# Patient Record
Sex: FEMALE | Race: WHITE | NOT HISPANIC OR LATINO | Employment: OTHER | URBAN - NONMETROPOLITAN AREA
[De-identification: names, ages, dates, MRNs, and addresses within clinical notes are randomized per-mention and may not be internally consistent; named-entity substitution may affect disease eponyms.]

---

## 2017-01-16 DIAGNOSIS — G89.4 CHRONIC PAIN DISORDER: ICD-10-CM

## 2017-01-16 RX ORDER — HYDROCODONE BITARTRATE AND ACETAMINOPHEN 7.5; 325 MG/1; MG/1
1 TABLET ORAL EVERY 4 HOURS PRN
Qty: 180 TABLET | Refills: 0 | Status: SHIPPED | OUTPATIENT
Start: 2017-01-16 | End: 2017-02-08 | Stop reason: SDUPTHER

## 2017-01-24 ENCOUNTER — OFFICE VISIT (OUTPATIENT)
Dept: ENDOCRINOLOGY | Facility: CLINIC | Age: 57
End: 2017-01-24

## 2017-01-24 VITALS
HEIGHT: 68 IN | DIASTOLIC BLOOD PRESSURE: 82 MMHG | SYSTOLIC BLOOD PRESSURE: 138 MMHG | WEIGHT: 255.4 LBS | HEART RATE: 101 BPM | BODY MASS INDEX: 38.71 KG/M2

## 2017-01-24 DIAGNOSIS — E06.3 HYPOTHYROIDISM DUE TO HASHIMOTO'S THYROIDITIS: ICD-10-CM

## 2017-01-24 DIAGNOSIS — Z79.4 TYPE 2 DIABETES MELLITUS WITH DIABETIC POLYNEUROPATHY, WITH LONG-TERM CURRENT USE OF INSULIN (HCC): Primary | ICD-10-CM

## 2017-01-24 DIAGNOSIS — E11.42 TYPE 2 DIABETES MELLITUS WITH DIABETIC POLYNEUROPATHY, WITH LONG-TERM CURRENT USE OF INSULIN (HCC): Primary | ICD-10-CM

## 2017-01-24 DIAGNOSIS — E55.9 VITAMIN D DEFICIENCY: ICD-10-CM

## 2017-01-24 DIAGNOSIS — E78.2 MIXED HYPERLIPIDEMIA: ICD-10-CM

## 2017-01-24 DIAGNOSIS — E03.8 HYPOTHYROIDISM DUE TO HASHIMOTO'S THYROIDITIS: ICD-10-CM

## 2017-01-24 PROCEDURE — 99214 OFFICE O/P EST MOD 30 MIN: CPT | Performed by: NURSE PRACTITIONER

## 2017-01-24 RX ORDER — EZETIMIBE 10 MG/1
10 TABLET ORAL DAILY
Qty: 30 TABLET | Refills: 11 | Status: SHIPPED | OUTPATIENT
Start: 2017-01-24 | End: 2017-03-17

## 2017-01-24 NOTE — PROGRESS NOTES
Subjective    Ariana Martinez is a 57 y.o. female. she is here today for follow-up.    History of Present Illness       Duration/Timing: Diabetes mellitus type 2, Age at onset of diabetes: 26 years years, Onset of symptoms gradual  timing - constant     qualtiy - uncontrolled     severity - moderate        -----------------------     Severity (Complications/Hospitalizations)  Secondary Macrovascular Complications: No CAD, No CVA, No PAD  Secondary Microvascular Complications: No Diabetic Nephropathy, No Diabetic Retinopathy, Diabetic Neuropathy     Context  Diabetes Regimen: Insulin, Oral Medications, Last HgbA1c% 12.3 from Jan. 2017  Blood Glucose Readings      200 to 300    Denies any low sugars     Diet  variable carb intake  Exercise: Exercises  walking     Associated Signs/Symptoms  Hyperglycemic Symptoms: No polyuria, No polydipsia, No polyphagia, No weight gain  Hypoglycemic Episodes: Documented symptomatic hypoglycemia, Seizures and syncope related to hypoglycemia         The following portions of the patient's history were reviewed and updated as appropriate:   Past Medical History   Diagnosis Date   • Acquired hypothyroidism    • Angina, class IV    • Anxiety    • Benign paroxysmal positional vertigo    • Carpal tunnel syndrome    • Chronic pain    • Coronary atherosclerosis    • Depression    • Diabetes mellitus      Type 2, controlled   • Diabetic polyneuropathy    • Female stress incontinence    • Hashimoto's thyroiditis    • Hyperlipidemia    • Hypertension    • Low back pain    • Malaise and fatigue    • Multiple joint pain    • Obesity      Refuses to be weighed   • Otalgia      Both   • Perforation of tympanic membrane      Left   • Postoperative wound infection    • Vitamin D deficiency      Past Surgical History   Procedure Laterality Date   • Abdominal surgery     • Angioplasty       coronary   • Coronary artery bypass graft     • Cardiac catheterization     • Carpal tunnel release     •  Cholecystectomy     • Endoscopy and colonoscopy     • Mouth surgery     • Transesophageal echocardiogram (mildred)       With color flow   • Foot surgery       Toes   • Gastric banding       Revision, laparoscopic   • Hysterectomy     • Shoulder surgery     • Salpingo oophorectomy       Family History   Problem Relation Age of Onset   • Diabetes Other    • Heart disease Other    • Hypertension Other      OB History     No data available        Current Outpatient Prescriptions   Medication Sig Dispense Refill   • ARIPiprazole (ABILIFY) 15 MG tablet Take 1 tablet by mouth daily. 30 tablet 5   • aspirin 81 MG chewable tablet Chew 81 mg daily.     • atorvastatin (LIPITOR) 40 MG tablet Take 40 mg by mouth daily.     • B-D ULTRAFINE III SHORT PEN 31G X 8 MM misc USE AS DIRECTED 4 TIMES DAILY 150 each 2   • Calcium Citrate-Vitamin D (CITRACAL/VITAMIN D) 250-200 MG-UNIT tablet Take 2 tablets by mouth 2 (two) times a day.     • celecoxib (CeleBREX) 200 MG capsule Take 1 capsule by mouth daily. 90 capsule 3   • clopidogrel (PLAVIX) 75 MG tablet Take 1 tablet by mouth Daily. 90 tablet 3   • dextromethorphan 15 MG/5ML syrup Take 10 mL by mouth 4 (four) times a day as needed for cough. 240 mL 3   • Ergocalciferol (VITAMIN D2 PO) Take 50,000 Units by mouth 1 (one) time per week.     • ezetimibe (ZETIA) 10 MG tablet Take 1 tablet by mouth Daily. 30 tablet 11   • fenofibrate (TRICOR) 145 MG tablet Take 1 tablet by mouth Daily. 30 tablet 5   • furosemide (LASIX) 40 MG tablet Take 1 tablet by mouth daily. 30 tablet 5   • gabapentin (NEURONTIN) 600 MG tablet Take 600 mg by mouth 3 (three) times a day.     • glucagon (GLUCAGON EMERGENCY) 1 MG injection Inject 1 mg under the skin 1 (one) time as needed for low blood sugar.     • hydrochlorothiazide (MICROZIDE) 12.5 MG capsule Take 12.5 mg by mouth daily.     • HYDROcodone-acetaminophen (NORCO) 7.5-325 MG per tablet Take 1 tablet by mouth Every 4 (Four) Hours As Needed for moderate pain  "(4-6). 180 tablet 0   • insulin aspart (NOVOLOG FLEXPEN) 100 UNIT/ML solution pen-injector sc pen Inject 40 units with meals 30 mL 3   • Insulin Degludec 100 UNIT/ML solution pen-injector Inject 70 Units under the skin Daily. 9 pen 3   • Insulin Disposable Pump (OMNIPOD STARTER) kit      • Insulin Disposable Pump (OMNIPOD) misc      • Insulin Syringe 29G X 1/2\" 0.3 ML misc Use 3 times a day 270 each 3   • KETOCARE strip USE 1 STRIP TO CHECK FOR KETONES IF BLOOD SUGAR IS GREATER THAN 250  99   • lisinopril (PRINIVIL,ZESTRIL) 2.5 MG tablet Take 1 tablet by mouth daily. 90 tablet 3   • LORazepam (ATIVAN) 2 MG tablet Take 1 tablet by mouth 2 (Two) Times a Day As Needed for anxiety. 60 tablet 2   • mirtazapine (REMERON) 45 MG tablet TAKE 1 TABLET BY MOUTH EVERY DAY AT BEDTIME  5   • ondansetron (ZOFRAN) 8 MG tablet Take 1 tablet by mouth every 8 (eight) hours. 90 tablet 3   • ONE TOUCH ULTRA TEST test strip USE TO TEST SUGAR 4 TIMES A  each 1   • pantoprazole (PROTONIX) 40 MG EC tablet Take 1 tablet by mouth daily. 90 tablet 3   • pentoxifylline (TRENTal) 400 MG CR tablet Take 400 mg by mouth 3 (three) times a day with meals.     • RANOLAZINE ER PO Take 1 tablet by mouth 2 (two) times a day.     • venlafaxine XR (EFFEXOR-XR) 150 MG 24 hr capsule TAKE ONE CAPSULE BY MOUTH TWICE A DAY FOR DEPRESSION 180 capsule 3   • vitamin D (ERGOCALCIFEROL) 67109 UNITS capsule capsule TAKE ONE CAPSULE BY MOUTH EVERY SUNDAY  2     No current facility-administered medications for this visit.      Allergies   Allergen Reactions   • Seroquel [Quetiapine Fumarate] Anaphylaxis   • Avandia [Rosiglitazone] Swelling   • Morphine And Related Hallucinations     If patient takes to much     Social History     Social History   • Marital status:      Spouse name: N/A   • Number of children: N/A   • Years of education: N/A     Social History Main Topics   • Smoking status: Never Smoker   • Smokeless tobacco: Never Used   • Alcohol use No " "  • Drug use: No   • Sexual activity: Defer     Other Topics Concern   • None     Social History Narrative       Review of Systems  Review of Systems   Constitutional: Negative for activity change, appetite change, diaphoresis and fatigue.   HENT: Negative for congestion, dental problem, drooling, ear discharge, ear pain, facial swelling, sneezing, sore throat, tinnitus, trouble swallowing and voice change.    Eyes: Negative for photophobia, pain, discharge, redness, itching and visual disturbance.   Respiratory: Negative for apnea, cough, choking, chest tightness and shortness of breath.    Cardiovascular: Negative for chest pain, palpitations and leg swelling.   Gastrointestinal: Negative for abdominal distention, abdominal pain, constipation, diarrhea, nausea and vomiting.   Endocrine: Negative for cold intolerance, heat intolerance, polydipsia, polyphagia and polyuria.   Genitourinary: Negative for difficulty urinating, dysuria, frequency, hematuria and urgency.   Musculoskeletal: Negative for arthralgias, back pain, gait problem, joint swelling, myalgias, neck pain and neck stiffness.   Skin: Negative for color change, pallor, rash and wound.   Allergic/Immunologic: Negative for environmental allergies, food allergies and immunocompromised state.   Neurological: Negative for dizziness, tremors, facial asymmetry, weakness, light-headedness, numbness and headaches.   Hematological: Negative for adenopathy. Does not bruise/bleed easily.   Psychiatric/Behavioral: Negative for agitation, behavioral problems, confusion, decreased concentration and sleep disturbance.        Objective      Visit Vitals   • /82 (BP Location: Left arm, Patient Position: Sitting, Cuff Size: Adult)   • Pulse 101   • Ht 68\" (172.7 cm)   • Wt 255 lb 6.4 oz (116 kg)   • BMI 38.83 kg/m2     Physical Exam   Constitutional: She is oriented to person, place, and time. She appears well-developed and well-nourished. No distress.   HENT: "   Head: Normocephalic and atraumatic.   Right Ear: External ear normal.   Left Ear: External ear normal.   Nose: Nose normal.   Eyes: Conjunctivae and EOM are normal. Pupils are equal, round, and reactive to light.   Neck: Normal range of motion. Neck supple. No tracheal deviation present. No thyromegaly present.   Cardiovascular: Normal rate, regular rhythm and normal heart sounds.    No murmur heard.  Pulmonary/Chest: Effort normal and breath sounds normal. No respiratory distress. She has no wheezes.   Abdominal: Soft. Bowel sounds are normal. There is no tenderness. There is no rebound and no guarding.   Musculoskeletal: Normal range of motion. She exhibits no edema, tenderness or deformity.   Neurological: She is alert and oriented to person, place, and time. No cranial nerve deficit.   Skin: Skin is warm and dry. No rash noted.   Psychiatric: She has a normal mood and affect. Her behavior is normal. Judgment and thought content normal.       Lab Review  GLUCOSE (mg/dl)   Date Value   01/19/2017 287 (H)   11/12/2016 250 (H)   11/11/2016 297 (H)     SODIUM (mmol/L)   Date Value   01/19/2017 140   11/12/2016 135 (L)   11/11/2016 134 (L)     POTASSIUM (mmol/L)   Date Value   01/19/2017 4.6   11/12/2016 4.0   11/11/2016 3.4 (L)     CHLORIDE (mmol/L)   Date Value   01/19/2017 100   11/12/2016 94 (L)   11/11/2016 90 (L)     CO2 (mmol/L)   Date Value   01/19/2017 28   11/12/2016 30   11/11/2016 28     BUN (mg/dl)   Date Value   01/19/2017 14   11/12/2016 18   11/11/2016 16     CREATININE (mg/dl)   Date Value   01/19/2017 0.9   11/12/2016 1.1 (H)   11/11/2016 1.3 (H)     HEMOGLOBIN A1C (%TotHgb)   Date Value   01/19/2017 12.3 (H)   08/26/2016 11.0 (H)   05/26/2016 11.5 (H)     TRIGLYCERIDES (mg/dl)   Date Value   01/19/2017 235 (H)   11/12/2016 290 (H)   06/20/2016 267 (H)       Assessment/Plan      1. Type 2 diabetes mellitus with diabetic polyneuropathy, with long-term current use of insulin    2. Hypothyroidism due  to Hashimoto's thyroiditis    3. Mixed hyperlipidemia    4. Vitamin D deficiency    .    Medications prescribed:  Outpatient Encounter Prescriptions as of 1/24/2017   Medication Sig Dispense Refill   • ARIPiprazole (ABILIFY) 15 MG tablet Take 1 tablet by mouth daily. 30 tablet 5   • aspirin 81 MG chewable tablet Chew 81 mg daily.     • atorvastatin (LIPITOR) 40 MG tablet Take 40 mg by mouth daily.     • B-D ULTRAFINE III SHORT PEN 31G X 8 MM misc USE AS DIRECTED 4 TIMES DAILY 150 each 2   • Calcium Citrate-Vitamin D (CITRACAL/VITAMIN D) 250-200 MG-UNIT tablet Take 2 tablets by mouth 2 (two) times a day.     • celecoxib (CeleBREX) 200 MG capsule Take 1 capsule by mouth daily. 90 capsule 3   • clopidogrel (PLAVIX) 75 MG tablet Take 1 tablet by mouth Daily. 90 tablet 3   • dextromethorphan 15 MG/5ML syrup Take 10 mL by mouth 4 (four) times a day as needed for cough. 240 mL 3   • Ergocalciferol (VITAMIN D2 PO) Take 50,000 Units by mouth 1 (one) time per week.     • ezetimibe (ZETIA) 10 MG tablet Take 1 tablet by mouth Daily. 30 tablet 11   • fenofibrate (TRICOR) 145 MG tablet Take 1 tablet by mouth Daily. 30 tablet 5   • furosemide (LASIX) 40 MG tablet Take 1 tablet by mouth daily. 30 tablet 5   • gabapentin (NEURONTIN) 600 MG tablet Take 600 mg by mouth 3 (three) times a day.     • glucagon (GLUCAGON EMERGENCY) 1 MG injection Inject 1 mg under the skin 1 (one) time as needed for low blood sugar.     • hydrochlorothiazide (MICROZIDE) 12.5 MG capsule Take 12.5 mg by mouth daily.     • HYDROcodone-acetaminophen (NORCO) 7.5-325 MG per tablet Take 1 tablet by mouth Every 4 (Four) Hours As Needed for moderate pain (4-6). 180 tablet 0   • insulin aspart (NOVOLOG FLEXPEN) 100 UNIT/ML solution pen-injector sc pen Inject 40 units with meals 30 mL 3   • Insulin Degludec 100 UNIT/ML solution pen-injector Inject 70 Units under the skin Daily. 9 pen 3   • Insulin Disposable Pump (OMNIPOD STARTER) kit      • Insulin Disposable Pump  "(OMNIPOD) misc      • Insulin Syringe 29G X 1/2\" 0.3 ML misc Use 3 times a day 270 each 3   • KETOCARE strip USE 1 STRIP TO CHECK FOR KETONES IF BLOOD SUGAR IS GREATER THAN 250  99   • lisinopril (PRINIVIL,ZESTRIL) 2.5 MG tablet Take 1 tablet by mouth daily. 90 tablet 3   • LORazepam (ATIVAN) 2 MG tablet Take 1 tablet by mouth 2 (Two) Times a Day As Needed for anxiety. 60 tablet 2   • mirtazapine (REMERON) 45 MG tablet TAKE 1 TABLET BY MOUTH EVERY DAY AT BEDTIME  5   • ondansetron (ZOFRAN) 8 MG tablet Take 1 tablet by mouth every 8 (eight) hours. 90 tablet 3   • ONE TOUCH ULTRA TEST test strip USE TO TEST SUGAR 4 TIMES A  each 1   • pantoprazole (PROTONIX) 40 MG EC tablet Take 1 tablet by mouth daily. 90 tablet 3   • pentoxifylline (TRENTal) 400 MG CR tablet Take 400 mg by mouth 3 (three) times a day with meals.     • RANOLAZINE ER PO Take 1 tablet by mouth 2 (two) times a day.     • venlafaxine XR (EFFEXOR-XR) 150 MG 24 hr capsule TAKE ONE CAPSULE BY MOUTH TWICE A DAY FOR DEPRESSION 180 capsule 3   • vitamin D (ERGOCALCIFEROL) 70701 UNITS capsule capsule TAKE ONE CAPSULE BY MOUTH EVERY SUNDAY  2   • [DISCONTINUED] insulin aspart (NovoLOG) 100 UNIT/ML injection Inject 120 Units under the skin daily.     • [DISCONTINUED] Insulin Glargine (TOUJEO SOLOSTAR) 300 UNIT/ML solution pen-injector Inject 60 Units under the skin Every Night. 3 pen 3     No facility-administered encounter medications on file as of 1/24/2017.        Orders placed during this encounter include:  Orders Placed This Encounter   Procedures   • Comprehensive Metabolic Panel   • Hemoglobin A1c   • Lipid Panel   • TSH   • Vitamin D 25 Hydroxy           Glycemic Management                    Toujeo ---patient taking 60- increase to 65 units --decreased to 60 units ( changed to tresiba per insurance)--try 63 units        ==================        Novolog      25 units with meals -- increase to 30 units     Discussed carb counting but states " cannot    She will need to look at her meals because 30 units may not be enough for some meals and for others meals to strong    If you eat a meal and give 30 units and your sugar is 200 or higher before the next meal look back at that meal and the next time you eat it either give more insulin or eat less    Also if you have a low after the meal give less insulin the next time you eat that meal                  ==================     Jardiance 10 mg tablet , take before breakfast---will stop due to dizziness for possible volume depletion-----the dizziness has resolved since stopping jardiance        ==================     Metformin 500 mg tablets - caused diarrhea      ====================     start bydureon - tolerated but since trouble with gastroparesis, stop            januvia 100 mg daily --keep     ------------------------------     Lipid Management        on Lipitor   on fenofibrate     August 2015     LDL - 122     missed some doses of medication      Feb. 2016     Tg - 241  LDL - 102     missing doses still - please be complaint     May 2016     LDL - 85     Jan. 2017    LDL - 120    Has been taking medication faithfully     LDL needs to be 70 or less    add zetia    Also discussed restarting the jardiance for the heart benefits but refuses        Blood Pressure Management: Control of blood pressure  on ACEi     stopped the lasix   Microvascular Complication Monitoring: Neuropathy type sensorial  neurontin     intermittetly takes reglan for gastroparesis     states eye exam ws 2015  RX for shoes - diabetic neuropathy      Immunization - last influenza vaccine Oct. 2016   Other Diabetes Related Aspects  TSH abnormal from July to Sept 2014     TSH in the 5 to 7 range     confirmed now Jan 2015     start levothyroxine 50 mcgs daily, skip on Sunday     now TSH nl      stopped levothyroxine      TSH - nl     will monitor TSH         August 2015     TSH - nl     FEb. 2016     TSh - 6     she refuses thyroid  medicaton      May 2016     TSH - 5     Jan. 2017      TSH - nl  ---     3-15     B12 low      now b12 shots     June 2015     B12- 968                     4. Follow-up: Return in about 3 months (around 4/24/2017) for Recheck.

## 2017-01-24 NOTE — MR AVS SNAPSHOT
Ariana Martinez   1/24/2017 9:15 AM   Office Visit    Dept Phone:  [phone]   Encounter #:  [id number]    Provider:  BEBE Prince   Department:  [facility] ENDOCRINOLOGY                Your Full Care Plan              Today's Medication Changes          These changes are accurate as of: 1/24/17  9:46 AM.  If you have any questions, ask your nurse or doctor.               Medication(s)that have changed:     insulin aspart 100 UNIT/ML solution pen-injector sc pen   Commonly known as:  novoLOG FLEXPEN   Inject 40 units with meals   What changed:  Another medication with the same name was removed. Continue taking this medication, and follow the directions you see here.   Changed by:  Zulay Dan MA         Stop taking medication(s)listed here:     Insulin Glargine 300 UNIT/ML solution pen-injector   Commonly known as:  TOUJEO SOLOSTAR   Stopped by:  BEBE Prince                      Your Updated Medication List          This list is accurate as of: 1/24/17  9:46 AM.  Always use your most recent med list.                ARIPiprazole 15 MG tablet   Commonly known as:  ABILIFY   Take 1 tablet by mouth daily.       aspirin 81 MG chewable tablet       atorvastatin 40 MG tablet   Commonly known as:  LIPITOR       B-D ULTRAFINE III SHORT PEN 31G X 8 MM misc   Generic drug:  Insulin Pen Needle   USE AS DIRECTED 4 TIMES DAILY       celecoxib 200 MG capsule   Commonly known as:  CeleBREX   Take 1 capsule by mouth daily.       CITRACAL/VITAMIN D 250-200 MG-UNIT tablet   Generic drug:  Calcium Citrate-Vitamin D       clopidogrel 75 MG tablet   Commonly known as:  PLAVIX   Take 1 tablet by mouth Daily.       dextromethorphan 15 MG/5ML syrup   Take 10 mL by mouth 4 (four) times a day as needed for cough.       fenofibrate 145 MG tablet   Commonly known as:  TRICOR   Take 1 tablet by mouth Daily.       furosemide 40 MG tablet   Commonly known as:   "LASIX   Take 1 tablet by mouth daily.       gabapentin 600 MG tablet   Commonly known as:  NEURONTIN       GLUCAGON EMERGENCY 1 MG injection   Generic drug:  glucagon       hydrochlorothiazide 12.5 MG capsule   Commonly known as:  MICROZIDE       HYDROcodone-acetaminophen 7.5-325 MG per tablet   Commonly known as:  NORCO   Take 1 tablet by mouth Every 4 (Four) Hours As Needed for moderate pain (4-6).       insulin aspart 100 UNIT/ML solution pen-injector sc pen   Commonly known as:  novoLOG FLEXPEN   Inject 40 units with meals       Insulin Degludec 100 UNIT/ML solution pen-injector   Inject 70 Units under the skin Daily.       Insulin Syringe 29G X 1/2\" 0.3 ML misc   Use 3 times a day       KETOCARE strip   Generic drug:  acetone (urine) test       lisinopril 2.5 MG tablet   Commonly known as:  PRINIVIL,ZESTRIL   Take 1 tablet by mouth daily.       LORazepam 2 MG tablet   Commonly known as:  ATIVAN   Take 1 tablet by mouth 2 (Two) Times a Day As Needed for anxiety.       mirtazapine 45 MG tablet   Commonly known as:  REMERON       * OMNIPOD misc       * OMNIPOD STARTER kit       ondansetron 8 MG tablet   Commonly known as:  ZOFRAN   Take 1 tablet by mouth every 8 (eight) hours.       ONE TOUCH ULTRA TEST test strip   Generic drug:  glucose blood   USE TO TEST SUGAR 4 TIMES A DAY       pantoprazole 40 MG EC tablet   Commonly known as:  PROTONIX   Take 1 tablet by mouth daily.       pentoxifylline 400 MG CR tablet   Commonly known as:  TRENtal       RANOLAZINE ER PO       venlafaxine  MG 24 hr capsule   Commonly known as:  EFFEXOR-XR   TAKE ONE CAPSULE BY MOUTH TWICE A DAY FOR DEPRESSION       * VITAMIN D2 PO       * vitamin D 72694 UNITS capsule capsule   Commonly known as:  ERGOCALCIFEROL       * Notice:  This list has 4 medication(s) that are the same as other medications prescribed for you. Read the directions carefully, and ask your doctor or other care provider to review them with you.            We " Performed the Following     CBC & Differential     Comprehensive Metabolic Panel     Hemoglobin A1c     Lipid Panel     TSH     Vitamin D 25 Hydroxy       You Were Diagnosed With        Codes Comments    Type 2 diabetes mellitus with diabetic polyneuropathy, with long-term current use of insulin    -  Primary ICD-10-CM: E11.42, Z79.4  ICD-9-CM: 250.60, 357.2, V58.67     Hypothyroidism due to Hashimoto's thyroiditis     ICD-10-CM: E03.8, E06.3  ICD-9-CM: 244.8, 245.2     Mixed hyperlipidemia     ICD-10-CM: E78.2  ICD-9-CM: 272.2     Vitamin D deficiency     ICD-10-CM: E55.9  ICD-9-CM: 268.9       Instructions     None    Patient Instructions History      Upcoming Appointments     Visit Type Date Time Department    OFFICE VISIT 1/24/2017  9:15 AM [facility] ENDOCRINOLOG[facility]    OFFICE VISIT 2/8/2017 11:15 AM [facility]    FOLLOW UP 4/24/2017  8:45 AM [facility] ENDOCRINOLOGY [facility]      MyChart Signup     Our records indicate that your [employer] account has been deactivated. If you would like to reactivate your account, please email [email or web address] or call [phone] to talk to our [employer] staff.             Other Info from Your Visit           Your Appointments     Feb 08, 2017 11:15 AM CST   Office Visit with Francisca Fong MD   [facility] (--)    [address] [place] [address] [address] [place] [address] [phone]           Arrive 15 minutes prior to appointment.            Apr 24, 2017  8:45 AM CDT   Follow Up with BEBE Prince   [facility] ENDOCRINOLOGY (--)    [address]  [place] [address] [place] [address] [place] [address] [phone]           Arrive 15 minutes prior to appointment.              Allergies     Seroquel [Quetiapine Fumarate]  Anaphylaxis    Avandia [Rosiglitazone]  Swelling    Morphine And Related  Hallucinations    If patient takes to much      Reason for Visit     Diabetes anaya      Vital Signs     Blood Pressure  "Pulse Height Weight Body Mass Index Smoking Status    138/82 (BP Location: Left arm, Patient Position: Sitting, Cuff Size: Adult) 101 68\" (172.7 cm) 255 lb 6.4 oz (116 kg) 38.83 kg/m2 Never Smoker      Problems and Diagnoses Noted     Hypothyroidism due to Hashimoto's thyroiditis    Mixed hyperlipidemia    Diabetes with neurologic complications    Vitamin D deficiency        "

## 2017-02-01 DIAGNOSIS — R60.9 FLUID RETENTION: ICD-10-CM

## 2017-02-02 DIAGNOSIS — R60.9 FLUID RETENTION: ICD-10-CM

## 2017-02-02 RX ORDER — FUROSEMIDE 40 MG/1
40 TABLET ORAL DAILY
Qty: 30 TABLET | Refills: 5 | Status: CANCELLED | OUTPATIENT
Start: 2017-02-02

## 2017-02-03 RX ORDER — FUROSEMIDE 40 MG/1
TABLET ORAL
Qty: 30 TABLET | Refills: 5 | Status: SHIPPED | OUTPATIENT
Start: 2017-02-03 | End: 2017-02-06 | Stop reason: SDUPTHER

## 2017-02-06 DIAGNOSIS — R60.9 FLUID RETENTION: ICD-10-CM

## 2017-02-06 RX ORDER — FUROSEMIDE 40 MG/1
40 TABLET ORAL DAILY
Qty: 90 TABLET | Refills: 3 | Status: SHIPPED | OUTPATIENT
Start: 2017-02-06 | End: 2018-03-01 | Stop reason: SDUPTHER

## 2017-02-08 ENCOUNTER — OFFICE VISIT (OUTPATIENT)
Dept: FAMILY MEDICINE CLINIC | Facility: CLINIC | Age: 57
End: 2017-02-08

## 2017-02-08 ENCOUNTER — LAB (OUTPATIENT)
Dept: LAB | Facility: OTHER | Age: 57
End: 2017-02-08

## 2017-02-08 VITALS
SYSTOLIC BLOOD PRESSURE: 132 MMHG | TEMPERATURE: 97.7 F | HEART RATE: 86 BPM | HEIGHT: 68 IN | DIASTOLIC BLOOD PRESSURE: 86 MMHG

## 2017-02-08 DIAGNOSIS — M54.6 ACUTE MIDLINE THORACIC BACK PAIN: ICD-10-CM

## 2017-02-08 DIAGNOSIS — Z11.59 NEED FOR HEPATITIS C SCREENING TEST: ICD-10-CM

## 2017-02-08 DIAGNOSIS — R20.0 LEFT ARM NUMBNESS: ICD-10-CM

## 2017-02-08 DIAGNOSIS — G89.4 CHRONIC PAIN DISORDER: ICD-10-CM

## 2017-02-08 DIAGNOSIS — F41.1 GAD (GENERALIZED ANXIETY DISORDER): ICD-10-CM

## 2017-02-08 DIAGNOSIS — Z79.891 LONG TERM PRESCRIPTION OPIATE USE: ICD-10-CM

## 2017-02-08 DIAGNOSIS — R07.9 CHEST PAIN, UNSPECIFIED TYPE: Primary | ICD-10-CM

## 2017-02-08 PROCEDURE — 99214 OFFICE O/P EST MOD 30 MIN: CPT | Performed by: FAMILY MEDICINE

## 2017-02-08 PROCEDURE — 80307 DRUG TEST PRSMV CHEM ANLYZR: CPT | Performed by: FAMILY MEDICINE

## 2017-02-08 PROCEDURE — 80074 ACUTE HEPATITIS PANEL: CPT | Performed by: FAMILY MEDICINE

## 2017-02-08 PROCEDURE — 93000 ELECTROCARDIOGRAM COMPLETE: CPT | Performed by: FAMILY MEDICINE

## 2017-02-08 RX ORDER — NABUMETONE 500 MG/1
500 TABLET, FILM COATED ORAL 2 TIMES DAILY PRN
Qty: 60 TABLET | Refills: 5 | Status: SHIPPED | OUTPATIENT
Start: 2017-02-08 | End: 2017-07-25 | Stop reason: SDUPTHER

## 2017-02-08 RX ORDER — LORAZEPAM 2 MG/1
2 TABLET ORAL 2 TIMES DAILY PRN
Qty: 60 TABLET | Refills: 2 | Status: SHIPPED | OUTPATIENT
Start: 2017-02-08 | End: 2017-05-08 | Stop reason: SDUPTHER

## 2017-02-08 RX ORDER — HYDROCODONE BITARTRATE AND ACETAMINOPHEN 7.5; 325 MG/1; MG/1
1 TABLET ORAL EVERY 4 HOURS PRN
Qty: 180 TABLET | Refills: 0 | Status: SHIPPED | OUTPATIENT
Start: 2017-02-08 | End: 2017-03-15 | Stop reason: SDUPTHER

## 2017-02-08 NOTE — PATIENT INSTRUCTIONS
Stop celebrex  Start relafen  Discuss with Dr Fernandez    ekg today    Ordered neck and thoracic spine xray    Screening for hepatitis C ordered    Recheck in 3 months

## 2017-02-08 NOTE — PROGRESS NOTES
Procedure     ECG 12 Lead  Date/Time: 2/8/2017 11:56 AM  Performed by: LISA SUTTON  Authorized by: LISA SUTTON   Comparison: not compared with previous ECG   Rhythm: sinus rhythm  Rate: normal  BPM: 86  ST Segments: ST segments normal  T Waves: T waves normal  QRS axis: normal  Other: no other findings  Clinical impression: normal ECG

## 2017-02-08 NOTE — PROGRESS NOTES
Subjective   Chief Complaint   Patient presents with   • Diabetes     3 month follow up     Ariana Martinez is a 57 y.o. female.   Diabetes (3 month follow up)    [facility] - peripheral neuropathy, anxiety, depression, obesity, diabetes    Chest Pain    This is a new problem. The current episode started yesterday. The onset quality is gradual. The problem occurs constantly. The problem has been gradually worsening. The pain is present in the substernal region. The pain is at a severity of 8/10. The pain is moderate. The quality of the pain is described as sharp and stabbing. The pain radiates to the mid back and left arm. Associated symptoms include back pain, malaise/fatigue and near-syncope. Pertinent negatives include no abdominal pain, cough, dizziness, fever, headaches, nausea, palpitations or shortness of breath. The pain is aggravated by movement. She has tried rest for the symptoms. The treatment provided mild relief.   Her past medical history is significant for anxiety/panic attacks.   Her family medical history is significant for CAD and stroke. Prior diagnostic workup includes echocardiogram and stress thallium.     dm - follow with Dr Onofre    chronic joint/ back pain - controlled with norco    depression - controlled with effexor and abilify    insomnia - controlled with remeron    celebrex is costing patient $80/ month out of pocket  Would like to change medication    The following portions of the patient's history were reviewed and updated as appropriate: allergies, current medications, past family history, past medical history, past social history, past surgical history and problem list.    Review of Systems   Constitutional: Positive for malaise/fatigue. Negative for appetite change, chills, fatigue and fever.   HENT: Negative for congestion, ear pain, rhinorrhea and sore throat.    Eyes: Negative for pain.   Respiratory: Negative for cough and shortness of breath.    Cardiovascular: Positive for  "near-syncope. Negative for chest pain and palpitations.   Gastrointestinal: Negative for abdominal pain, constipation, diarrhea and nausea.   Genitourinary: Negative for dysuria.   Musculoskeletal: Positive for arthralgias, back pain and neck pain. Negative for joint swelling.   Skin: Negative for rash.   Neurological: Negative for dizziness and headaches.       Objective   Visit Vitals   • /86   • Pulse 86   • Temp 97.7 °F (36.5 °C)   • Ht 68\" (172.7 cm)     Physical Exam   Constitutional: She is oriented to person, place, and time. She appears well-developed and well-nourished.   HENT:   Head: Normocephalic and atraumatic.   Eyes: Pupils are equal, round, and reactive to light.   Neck: Neck supple. Spinous process tenderness and muscular tenderness present. Decreased range of motion present.   Cardiovascular: Normal rate, regular rhythm, normal heart sounds and normal pulses.    No chest wall tenderness with palpation   Pulmonary/Chest: Effort normal and breath sounds normal. No respiratory distress. She has no wheezes. She has no rales.   Abdominal: Soft. Bowel sounds are normal.   Musculoskeletal:        Cervical back: She exhibits decreased range of motion, bony tenderness and pain.        Thoracic back: She exhibits decreased range of motion, bony tenderness and pain.   Neurological: She is alert and oriented to person, place, and time.   Skin: Skin is warm and dry.   Psychiatric: She has a normal mood and affect.   Nursing note and vitals reviewed.      Assessment/Plan   Problems Addressed this Visit        Other    MAURICIO (generalized anxiety disorder)    Relevant Medications    LORazepam (ATIVAN) 2 MG tablet    Long term prescription opiate use    Relevant Orders    Urine Drug Screen    Chronic pain disorder    Relevant Medications    HYDROcodone-acetaminophen (NORCO) 7.5-325 MG per tablet      Other Visit Diagnoses     Chest pain, unspecified type    -  Primary    Relevant Orders    ECG 12 Lead    Left " arm numbness        Relevant Orders    XR Spine Cervical 3 View    Acute midline thoracic back pain        Relevant Orders    XR Spine Thoracic 2 View    Need for hepatitis C screening test        Relevant Orders    Hepatitis Panel, Acute        Stop celebrex  Start relafen  Discuss with Dr Fernandez    ekleonor today    uds today  perla up to date  Refilled norco and ativan    Ordered neck and thoracic spine xray    Screening for hepatitis C ordered    Recheck in 3 months

## 2017-02-09 ENCOUNTER — TELEPHONE (OUTPATIENT)
Dept: FAMILY MEDICINE CLINIC | Facility: CLINIC | Age: 57
End: 2017-02-09

## 2017-02-09 DIAGNOSIS — F33.1 DEPRESSION, MAJOR, RECURRENT, MODERATE (HCC): ICD-10-CM

## 2017-02-09 LAB
AMPHET+METHAMPHET UR QL: NEGATIVE
BARBITURATES UR QL SCN: NEGATIVE
BENZODIAZ UR QL SCN: NEGATIVE
CANNABINOIDS SERPL QL: NEGATIVE
COCAINE UR QL: NEGATIVE
METHADONE UR QL SCN: NEGATIVE
OPIATES UR QL: POSITIVE
OXYCODONE UR QL SCN: NEGATIVE

## 2017-02-09 RX ORDER — ARIPIPRAZOLE 15 MG/1
TABLET ORAL
Qty: 30 TABLET | Refills: 5 | Status: SHIPPED | OUTPATIENT
Start: 2017-02-09 | End: 2017-09-28 | Stop reason: SDUPTHER

## 2017-02-09 NOTE — TELEPHONE ENCOUNTER
----- Message from Francisca Fong MD sent at 2/8/2017  3:16 PM CST -----  Mild degenerative changes of the thoracic spine.  No compression abnormality.  No fractures.

## 2017-02-09 NOTE — TELEPHONE ENCOUNTER
Called pt and told her results. Told her to continue taking her meds for pain. Pt stated her understanding.

## 2017-02-10 LAB
HAV IGM SERPL QL IA: NEGATIVE
HBV CORE IGM SERPL QL IA: NEGATIVE
HBV SURFACE AG SERPL QL IA: NEGATIVE
HCV AB SER DONR QL: NEGATIVE
HCV S/C RATIO: 0.03 (ref 0–0.89)

## 2017-02-21 ENCOUNTER — TELEPHONE (OUTPATIENT)
Dept: FAMILY MEDICINE CLINIC | Facility: CLINIC | Age: 57
End: 2017-02-21

## 2017-02-21 RX ORDER — MIRTAZAPINE 45 MG/1
TABLET, FILM COATED ORAL
Qty: 30 TABLET | Refills: 5 | Status: SHIPPED | OUTPATIENT
Start: 2017-02-21 | End: 2017-08-20 | Stop reason: SDUPTHER

## 2017-02-21 NOTE — TELEPHONE ENCOUNTER
----- Message from Zulay Escalona sent at 2/21/2017  9:03 AM CST -----  Patient called and said we had tried to reach her for the results on her and her husbands ([email or web address]) bloodwork. She said you could give her a call back at [phone]. Nadeem Martinez (10/12/1944)

## 2017-03-11 ENCOUNTER — APPOINTMENT (OUTPATIENT)
Dept: GENERAL RADIOLOGY | Facility: HOSPITAL | Age: 57
End: 2017-03-11

## 2017-03-11 ENCOUNTER — HOSPITAL ENCOUNTER (OUTPATIENT)
Facility: HOSPITAL | Age: 57
Setting detail: OBSERVATION
Discharge: HOME | End: 2017-03-12
Attending: FAMILY MEDICINE | Admitting: INTERNAL MEDICINE

## 2017-03-11 DIAGNOSIS — R07.2 PRECORDIAL PAIN: Primary | ICD-10-CM

## 2017-03-11 LAB
ALBUMIN SERPL-MCNC: 4.7 G/DL (ref 3.4–4.8)
ALBUMIN/GLOB SERPL: 1.6 G/DL (ref 1.1–1.8)
ALP SERPL-CCNC: 84 U/L (ref 38–126)
ALT SERPL W P-5'-P-CCNC: 46 U/L (ref 9–52)
ANION GAP SERPL CALCULATED.3IONS-SCNC: 17 MMOL/L (ref 5–15)
AST SERPL-CCNC: 25 U/L (ref 14–36)
BASOPHILS # BLD AUTO: 0.04 10*3/MM3 (ref 0–0.2)
BASOPHILS NFR BLD AUTO: 0.5 % (ref 0–2)
BILIRUB SERPL-MCNC: 0.8 MG/DL (ref 0.2–1.3)
BUN BLD-MCNC: 21 MG/DL (ref 7–21)
BUN/CREAT SERPL: 21.2 (ref 7–25)
CALCIUM SPEC-SCNC: 9.9 MG/DL (ref 8.4–10.2)
CHLORIDE SERPL-SCNC: 97 MMOL/L (ref 95–110)
CK MB SERPL-CCNC: 1.24 NG/ML (ref 0–5)
CK MB SERPL-CCNC: 1.57 NG/ML (ref 0–5)
CK SERPL-CCNC: 43 U/L (ref 30–135)
CK SERPL-CCNC: 57 U/L (ref 30–135)
CO2 SERPL-SCNC: 24 MMOL/L (ref 22–31)
CREAT BLD-MCNC: 0.99 MG/DL (ref 0.5–1)
DEPRECATED RDW RBC AUTO: 39.4 FL (ref 36.4–46.3)
EOSINOPHIL # BLD AUTO: 0.22 10*3/MM3 (ref 0–0.7)
EOSINOPHIL NFR BLD AUTO: 2.6 % (ref 0–7)
ERYTHROCYTE [DISTWIDTH] IN BLOOD BY AUTOMATED COUNT: 13.1 % (ref 11.5–14.5)
GFR SERPL CREATININE-BSD FRML MDRD: 58 ML/MIN/1.73 (ref 51–120)
GLOBULIN UR ELPH-MCNC: 3 GM/DL (ref 2.3–3.5)
GLUCOSE BLD-MCNC: 302 MG/DL (ref 60–100)
GLUCOSE BLDC GLUCOMTR-MCNC: 145 MG/DL (ref 70–130)
GLUCOSE BLDC GLUCOMTR-MCNC: 60 MG/DL (ref 70–130)
HBA1C MFR BLD: 11.99 % (ref 4–5.6)
HCT VFR BLD AUTO: 39.6 % (ref 35–45)
HGB BLD-MCNC: 13.6 G/DL (ref 12–15.5)
HOLD SPECIMEN: NORMAL
HOLD SPECIMEN: NORMAL
IMM GRANULOCYTES # BLD: 0.02 10*3/MM3 (ref 0–0.02)
IMM GRANULOCYTES NFR BLD: 0.2 % (ref 0–0.5)
INR PPP: 0.94 (ref 0.8–1.2)
LIPASE SERPL-CCNC: 163 U/L (ref 23–300)
LYMPHOCYTES # BLD AUTO: 1.73 10*3/MM3 (ref 0.6–4.2)
LYMPHOCYTES NFR BLD AUTO: 20.1 % (ref 10–50)
MCH RBC QN AUTO: 28.9 PG (ref 26.5–34)
MCHC RBC AUTO-ENTMCNC: 34.3 G/DL (ref 31.4–36)
MCV RBC AUTO: 84.1 FL (ref 80–98)
MONOCYTES # BLD AUTO: 0.27 10*3/MM3 (ref 0–0.9)
MONOCYTES NFR BLD AUTO: 3.1 % (ref 0–12)
NEUTROPHILS # BLD AUTO: 6.33 10*3/MM3 (ref 2–8.6)
NEUTROPHILS NFR BLD AUTO: 73.5 % (ref 37–80)
NT-PROBNP SERPL-MCNC: 70.6 PG/ML (ref 0–900)
PLATELET # BLD AUTO: 446 10*3/MM3 (ref 150–450)
PMV BLD AUTO: 9.9 FL (ref 8–12)
POTASSIUM BLD-SCNC: 3.6 MMOL/L (ref 3.5–5.1)
PROT SERPL-MCNC: 7.7 G/DL (ref 6.3–8.6)
PROTHROMBIN TIME: 12.6 SECONDS (ref 11.1–15.3)
RBC # BLD AUTO: 4.71 10*6/MM3 (ref 3.77–5.16)
SODIUM BLD-SCNC: 138 MMOL/L (ref 137–145)
TROPONIN I SERPL-MCNC: <0.012 NG/ML
TSH SERPL DL<=0.05 MIU/L-ACNC: 4.87 MIU/ML (ref 0.46–4.68)
WBC NRBC COR # BLD: 8.61 10*3/MM3 (ref 3.2–9.8)
WHOLE BLOOD HOLD SPECIMEN: NORMAL
WHOLE BLOOD HOLD SPECIMEN: NORMAL

## 2017-03-11 PROCEDURE — 99243 OFF/OP CNSLTJ NEW/EST LOW 30: CPT | Performed by: INTERNAL MEDICINE

## 2017-03-11 PROCEDURE — 96375 TX/PRO/DX INJ NEW DRUG ADDON: CPT

## 2017-03-11 PROCEDURE — 85025 COMPLETE CBC W/AUTO DIFF WBC: CPT | Performed by: FAMILY MEDICINE

## 2017-03-11 PROCEDURE — 83690 ASSAY OF LIPASE: CPT | Performed by: FAMILY MEDICINE

## 2017-03-11 PROCEDURE — 84484 ASSAY OF TROPONIN QUANT: CPT | Performed by: FAMILY MEDICINE

## 2017-03-11 PROCEDURE — 83880 ASSAY OF NATRIURETIC PEPTIDE: CPT | Performed by: FAMILY MEDICINE

## 2017-03-11 PROCEDURE — 93005 ELECTROCARDIOGRAM TRACING: CPT | Performed by: FAMILY MEDICINE

## 2017-03-11 PROCEDURE — 82553 CREATINE MB FRACTION: CPT | Performed by: FAMILY MEDICINE

## 2017-03-11 PROCEDURE — 84443 ASSAY THYROID STIM HORMONE: CPT | Performed by: FAMILY MEDICINE

## 2017-03-11 PROCEDURE — 25010000002 ONDANSETRON PER 1 MG: Performed by: FAMILY MEDICINE

## 2017-03-11 PROCEDURE — 96376 TX/PRO/DX INJ SAME DRUG ADON: CPT

## 2017-03-11 PROCEDURE — 82550 ASSAY OF CK (CPK): CPT | Performed by: FAMILY MEDICINE

## 2017-03-11 PROCEDURE — G0378 HOSPITAL OBSERVATION PER HR: HCPCS

## 2017-03-11 PROCEDURE — 83036 HEMOGLOBIN GLYCOSYLATED A1C: CPT | Performed by: FAMILY MEDICINE

## 2017-03-11 PROCEDURE — 25010000002 MORPHINE PER 10 MG: Performed by: FAMILY MEDICINE

## 2017-03-11 PROCEDURE — 93010 ELECTROCARDIOGRAM REPORT: CPT | Performed by: INTERNAL MEDICINE

## 2017-03-11 PROCEDURE — 80053 COMPREHEN METABOLIC PANEL: CPT | Performed by: FAMILY MEDICINE

## 2017-03-11 PROCEDURE — 99284 EMERGENCY DEPT VISIT MOD MDM: CPT

## 2017-03-11 PROCEDURE — 82962 GLUCOSE BLOOD TEST: CPT

## 2017-03-11 PROCEDURE — 71020 HC CHEST PA AND LATERAL: CPT

## 2017-03-11 PROCEDURE — 96374 THER/PROPH/DIAG INJ IV PUSH: CPT

## 2017-03-11 PROCEDURE — 85610 PROTHROMBIN TIME: CPT | Performed by: FAMILY MEDICINE

## 2017-03-11 PROCEDURE — 93005 ELECTROCARDIOGRAM TRACING: CPT

## 2017-03-11 RX ORDER — MORPHINE SULFATE 4 MG/ML
4 INJECTION, SOLUTION INTRAMUSCULAR; INTRAVENOUS ONCE
Status: COMPLETED | OUTPATIENT
Start: 2017-03-11 | End: 2017-03-11

## 2017-03-11 RX ORDER — DEXTROSE MONOHYDRATE 25 G/50ML
25 INJECTION, SOLUTION INTRAVENOUS
Status: DISCONTINUED | OUTPATIENT
Start: 2017-03-11 | End: 2017-03-12 | Stop reason: HOSPADM

## 2017-03-11 RX ORDER — ASPIRIN 81 MG/1
81 TABLET, CHEWABLE ORAL DAILY
Status: DISCONTINUED | OUTPATIENT
Start: 2017-03-11 | End: 2017-03-12 | Stop reason: HOSPADM

## 2017-03-11 RX ORDER — LORAZEPAM 1 MG/1
2 TABLET ORAL 2 TIMES DAILY PRN
Status: DISCONTINUED | OUTPATIENT
Start: 2017-03-11 | End: 2017-03-12 | Stop reason: HOSPADM

## 2017-03-11 RX ORDER — GABAPENTIN 300 MG/1
600 CAPSULE ORAL EVERY 8 HOURS SCHEDULED
Status: DISCONTINUED | OUTPATIENT
Start: 2017-03-11 | End: 2017-03-12 | Stop reason: HOSPADM

## 2017-03-11 RX ORDER — NICOTINE POLACRILEX 4 MG
15 LOZENGE BUCCAL
Status: DISCONTINUED | OUTPATIENT
Start: 2017-03-11 | End: 2017-03-12 | Stop reason: HOSPADM

## 2017-03-11 RX ORDER — MIRTAZAPINE 15 MG/1
45 TABLET, FILM COATED ORAL NIGHTLY
Status: DISCONTINUED | OUTPATIENT
Start: 2017-03-11 | End: 2017-03-12 | Stop reason: HOSPADM

## 2017-03-11 RX ORDER — FUROSEMIDE 40 MG/1
40 TABLET ORAL DAILY
Status: DISCONTINUED | OUTPATIENT
Start: 2017-03-11 | End: 2017-03-12 | Stop reason: HOSPADM

## 2017-03-11 RX ORDER — PANTOPRAZOLE SODIUM 40 MG/1
40 TABLET, DELAYED RELEASE ORAL DAILY
Status: DISCONTINUED | OUTPATIENT
Start: 2017-03-11 | End: 2017-03-12 | Stop reason: HOSPADM

## 2017-03-11 RX ORDER — PENTOXIFYLLINE 400 MG/1
400 TABLET, EXTENDED RELEASE ORAL
Status: DISCONTINUED | OUTPATIENT
Start: 2017-03-11 | End: 2017-03-12 | Stop reason: HOSPADM

## 2017-03-11 RX ORDER — VENLAFAXINE HYDROCHLORIDE 75 MG/1
150 CAPSULE, EXTENDED RELEASE ORAL
Status: DISCONTINUED | OUTPATIENT
Start: 2017-03-12 | End: 2017-03-12 | Stop reason: HOSPADM

## 2017-03-11 RX ORDER — ARIPIPRAZOLE 15 MG/1
15 TABLET ORAL DAILY
Status: DISCONTINUED | OUTPATIENT
Start: 2017-03-12 | End: 2017-03-12 | Stop reason: HOSPADM

## 2017-03-11 RX ORDER — SODIUM CHLORIDE 0.9 % (FLUSH) 0.9 %
10 SYRINGE (ML) INJECTION AS NEEDED
Status: DISCONTINUED | OUTPATIENT
Start: 2017-03-11 | End: 2017-03-12 | Stop reason: HOSPADM

## 2017-03-11 RX ORDER — ATORVASTATIN CALCIUM 40 MG/1
40 TABLET, FILM COATED ORAL EVERY EVENING
Status: DISCONTINUED | OUTPATIENT
Start: 2017-03-11 | End: 2017-03-12 | Stop reason: HOSPADM

## 2017-03-11 RX ORDER — ONDANSETRON 2 MG/ML
4 INJECTION INTRAMUSCULAR; INTRAVENOUS ONCE
Status: COMPLETED | OUTPATIENT
Start: 2017-03-11 | End: 2017-03-11

## 2017-03-11 RX ORDER — RANOLAZINE 500 MG/1
1000 TABLET, EXTENDED RELEASE ORAL 2 TIMES DAILY
COMMUNITY
End: 2017-03-17

## 2017-03-11 RX ORDER — ASPIRIN 81 MG/1
162 TABLET, CHEWABLE ORAL ONCE
Status: COMPLETED | OUTPATIENT
Start: 2017-03-11 | End: 2017-03-11

## 2017-03-11 RX ORDER — SODIUM CHLORIDE 0.9 % (FLUSH) 0.9 %
1-10 SYRINGE (ML) INJECTION AS NEEDED
Status: DISCONTINUED | OUTPATIENT
Start: 2017-03-11 | End: 2017-03-12 | Stop reason: HOSPADM

## 2017-03-11 RX ORDER — RANOLAZINE 500 MG/1
1000 TABLET, EXTENDED RELEASE ORAL 2 TIMES DAILY
Status: DISCONTINUED | OUTPATIENT
Start: 2017-03-11 | End: 2017-03-12 | Stop reason: HOSPADM

## 2017-03-11 RX ORDER — ONDANSETRON 2 MG/ML
4 INJECTION INTRAMUSCULAR; INTRAVENOUS EVERY 6 HOURS PRN
Status: DISCONTINUED | OUTPATIENT
Start: 2017-03-11 | End: 2017-03-12 | Stop reason: HOSPADM

## 2017-03-11 RX ORDER — CLOPIDOGREL BISULFATE 75 MG/1
75 TABLET ORAL DAILY
Status: DISCONTINUED | OUTPATIENT
Start: 2017-03-11 | End: 2017-03-12 | Stop reason: HOSPADM

## 2017-03-11 RX ORDER — LISINOPRIL 2.5 MG/1
2.5 TABLET ORAL DAILY
Status: DISCONTINUED | OUTPATIENT
Start: 2017-03-11 | End: 2017-03-12 | Stop reason: HOSPADM

## 2017-03-11 RX ORDER — HYDROCODONE BITARTRATE AND ACETAMINOPHEN 7.5; 325 MG/1; MG/1
1 TABLET ORAL EVERY 4 HOURS PRN
Status: DISCONTINUED | OUTPATIENT
Start: 2017-03-11 | End: 2017-03-12 | Stop reason: HOSPADM

## 2017-03-11 RX ORDER — FENOFIBRATE 145 MG/1
145 TABLET, COATED ORAL DAILY
Status: DISCONTINUED | OUTPATIENT
Start: 2017-03-11 | End: 2017-03-12 | Stop reason: HOSPADM

## 2017-03-11 RX ADMIN — ASPIRIN 81 MG 162 MG: 81 TABLET ORAL at 13:09

## 2017-03-11 RX ADMIN — ONDANSETRON 4 MG: 2 INJECTION INTRAMUSCULAR; INTRAVENOUS at 19:36

## 2017-03-11 RX ADMIN — MIRTAZAPINE 45 MG: 15 TABLET, FILM COATED ORAL at 20:18

## 2017-03-11 RX ADMIN — Medication 10 ML: at 14:00

## 2017-03-11 RX ADMIN — GABAPENTIN 600 MG: 300 CAPSULE ORAL at 21:22

## 2017-03-11 RX ADMIN — RANOLAZINE 1000 MG: 500 TABLET, FILM COATED, EXTENDED RELEASE ORAL at 20:18

## 2017-03-11 RX ADMIN — Medication 10 ML: at 19:37

## 2017-03-11 RX ADMIN — PENTOXIFYLLINE 400 MG: 400 TABLET, FILM COATED, EXTENDED RELEASE ORAL at 20:18

## 2017-03-11 RX ADMIN — ONDANSETRON 4 MG: 2 INJECTION INTRAMUSCULAR; INTRAVENOUS at 13:09

## 2017-03-11 RX ADMIN — NITROGLYCERIN 1 INCH: 20 OINTMENT TOPICAL at 13:09

## 2017-03-11 RX ADMIN — MORPHINE SULFATE 4 MG: 4 INJECTION, SOLUTION INTRAMUSCULAR; INTRAVENOUS at 14:00

## 2017-03-11 RX ADMIN — MORPHINE SULFATE 4 MG: 4 INJECTION, SOLUTION INTRAMUSCULAR; INTRAVENOUS at 13:10

## 2017-03-11 NOTE — H&P
"      [facility] Medicine Admission      Date of Admission: 3/11/2017      Primary Care Physician: Francisca Fong MD      Chief Complaint: Chest pain    HPI:  This 57-year-old  female reported to the emergency department with complaints chest pain.  Patient reports that this chest pain did occur sometime last night, it radiated from substernal area down her abdomen, and then it subsided.  She then went back to sleep.  Patient reports that she woke up around 7 AM today and felt like the pain returned.  She stated, \"it felt like someone reached inside me, squeezed, and let go.  \"Patient endorses nausea, shortness of air, and denies dyspnea upon exertion.  States that when she stands she gets lightheaded, but denies syncope.  Denies aggravating or relieving factors.  Patient states that 12 years ago she did receive a CABG.  Usually sees Dr. Fernandez, but is currently changing services.  Patient denies hematuria, hematochezia, melena, hematemesis, syncope, vision changes, headaches, worse headache of life, fever, chills, or recent illness.  Patient has been seen in the past by Dr. Fernandez and he continues to recommend medical management.    Concurrent Medical History:  has a past medical history of Acquired hypothyroidism; Angina, class IV; Anxiety; Benign paroxysmal positional vertigo; Carpal tunnel syndrome; Chronic pain; Coronary atherosclerosis; Depression; Diabetes mellitus; Diabetic polyneuropathy; Female stress incontinence; Hashimoto's thyroiditis; Hyperlipidemia; Hypertension; Low back pain; Malaise and fatigue; Multiple joint pain; Obesity; Otalgia; Perforation of tympanic membrane; Postoperative wound infection; and Vitamin D deficiency.    Past Surgical History:  has a past surgical history that includes Abdominal surgery; Angioplasty; Coronary artery bypass graft; Cardiac catheterization; Carpal tunnel release; Cholecystectomy; endoscopy and colonoscopy; " Mouth surgery; transesophageal echocardiogram ([place]); Foot surgery; gastric banding; Hysterectomy; Shoulder surgery; and Salpingoophorectomy.    Family History: family history includes Diabetes in her other; Heart disease in her other; Hypertension in her other.    Social History:  reports that she has never smoked. She has never used smokeless tobacco. She reports that she does not drink alcohol or use illicit drugs.    Allergies:   Allergies   Allergen Reactions   • Seroquel [Quetiapine Fumarate] Anaphylaxis   • Avandia [Rosiglitazone] Swelling   • Morphine And Related Hallucinations     If patient takes to much       Medications: Scheduled Meds:  [START ON 3/12/2017] ARIPiprazole 15 mg Oral Daily   aspirin 81 mg Oral Daily   atorvastatin 40 mg Oral Q PM   clopidogrel 75 mg Oral Daily   fenofibrate 145 mg Oral Daily   furosemide 40 mg Oral Daily   gabapentin 600 mg Oral Q8H   insulin aspart 0-14 Units Subcutaneous 4x Daily AC & at Bedtime   lisinopril 2.5 mg Oral Daily   mirtazapine 45 mg Oral Nightly   pantoprazole 40 mg Oral Daily   pentoxifylline 400 mg Oral TID With Meals   ranolazine 1,000 mg Oral BID   [START ON 3/12/2017] venlafaxine  mg Oral Daily With Breakfast     Continuous Infusions:   PRN Meds:.dextrose  •  dextrose  •  glucagon (human recombinant)  •  HYDROcodone-acetaminophen  •  LORazepam  •  ondansetron  •  sodium chloride  •  sodium chloride    Review of Systems:  Review of Systems   Constitutional: Negative for chills and fever.   Respiratory: Positive for chest tightness and shortness of breath. Negative for cough, choking and wheezing.    Cardiovascular: Positive for chest pain. Negative for palpitations and leg swelling.   Gastrointestinal: Positive for nausea. Negative for abdominal pain, constipation, diarrhea and vomiting.   Genitourinary: Negative for decreased urine volume, dysuria, flank pain, hematuria and urgency.   Neurological: Positive for dizziness. Negative for tremors,  seizures, syncope, facial asymmetry, speech difficulty, weakness, light-headedness, numbness and headaches.   Psychiatric/Behavioral: Negative for confusion.        Otherwise complete ROS is negative except as mentioned above.    Physical Exam:   Temp:  [98 °F (36.7 °C)-98.1 °F (36.7 °C)] 98.1 °F (36.7 °C)  Heart Rate:  [] 71  Resp:  [16-18] 18  BP: (112-169)/(60-95) 142/67  Physical Exam   Constitutional: She is oriented to person, place, and time. She appears well-developed and well-nourished. No distress.   Patient sleeping prior to exam, had to be woken up; patient intermittently napped during exam.  No apparent distress.   HENT:   Head: Normocephalic and atraumatic.   Eyes: Conjunctivae and EOM are normal. Pupils are equal, round, and reactive to light.   Neck: Neck supple. No tracheal deviation present.   Cardiovascular: Normal rate, regular rhythm, normal heart sounds and intact distal pulses.  Exam reveals no gallop and no friction rub.    No murmur heard.  Pulmonary/Chest: Effort normal and breath sounds normal. No stridor. No respiratory distress. She has no wheezes. She has no rales. She exhibits no tenderness.   Abdominal: Soft. Bowel sounds are normal. She exhibits no distension. There is no tenderness. There is no rebound and no guarding.   Neurological: She is alert and oriented to person, place, and time.   Skin: Skin is warm and dry. She is not diaphoretic.   Psychiatric: She has a normal mood and affect.       Results Reviewed:  I have personally reviewed current lab, radiology, and data and agree with results.  Lab Results (last 24 hours)     Procedure Component Value Units Date/Time    CBC & Differential [[id number]] Collected:  03/11/17 1122    Specimen:  Blood Updated:  03/11/17 1200    Narrative:       The following orders were created for panel order CBC & Differential.  Procedure                               Abnormality         Status                     ---------                                -----------         ------                     CBC Auto Differential[[id number]]         Normal              Final result                 Please view results for these tests on the individual orders.    CBC Auto Differential [[id number]]  (Normal) Collected:  03/11/17 1122    Specimen:  Blood Updated:  03/11/17 1200     WBC 8.61 10*3/mm3      RBC 4.71 10*6/mm3      Hemoglobin 13.6 g/dL      Hematocrit 39.6 %      MCV 84.1 fL      MCH 28.9 pg      MCHC 34.3 g/dL      RDW 13.1 %      RDW-SD 39.4 fl      MPV 9.9 fL      Platelets 446 10*3/mm3      Neutrophil % 73.5 %      Lymphocyte % 20.1 %      Monocyte % 3.1 %      Eosinophil % 2.6 %      Basophil % 0.5 %      Immature Grans % 0.2 %      Neutrophils, Absolute 6.33 10*3/mm3      Lymphocytes, Absolute 1.73 10*3/mm3      Monocytes, Absolute 0.27 10*3/mm3      Eosinophils, Absolute 0.22 10*3/mm3      Basophils, Absolute 0.04 10*3/mm3      Immature Grans, Absolute 0.02 10*3/mm3     Comprehensive Metabolic Panel [[id number]]  (Abnormal) Collected:  03/11/17 1122    Specimen:  Blood Updated:  03/11/17 1210     Glucose 302 (H) mg/dL      BUN 21 mg/dL      Creatinine 0.99 mg/dL      Sodium 138 mmol/L      Potassium 3.6 mmol/L      Chloride 97 mmol/L      CO2 24.0 mmol/L      Calcium 9.9 mg/dL      Total Protein 7.7 g/dL      Albumin 4.70 g/dL      ALT (SGPT) 46 U/L      AST (SGOT) 25 U/L      Alkaline Phosphatase 84 U/L      Total Bilirubin 0.8 mg/dL      eGFR Non African Amer 58 mL/min/1.73      Globulin 3.0 gm/dL      A/G Ratio 1.6 g/dL      BUN/Creatinine Ratio 21.2      Anion Gap 17.0 (H) mmol/L     Troponin [[id number]]  (Normal) Collected:  03/11/17 1122    Specimen:  Blood Updated:  03/11/17 1222     Troponin I <0.012 ng/mL     BNP [[id number]]  (Normal) Collected:  03/11/17 1122    Specimen:  Blood Updated:  03/11/17 1222     proBNP 70.6 pg/mL     Protime-INR [[id number]]  (Normal) Collected:  03/11/17 1122    Specimen:  Blood Updated:  03/11/17 1231     Protime 12.6  Seconds      INR 0.94     Narrative:       Therapeutic range for most indications is 2.0-3.0 INR,  or 2.5-3.5 for mechanical heart valves.    CK [[id number]]  (Normal) Collected:  03/11/17 1122    Specimen:  Blood Updated:  03/11/17 1241     Creatine Kinase 57 U/L     Lipase [[id number]]  (Normal) Collected:  03/11/17 1122    Specimen:  Blood Updated:  03/11/17 1241     Lipase 163 U/L     CK-MB [[id number]]  (Normal) Collected:  03/11/17 1122    Specimen:  Blood Updated:  03/11/17 1251     CKMB 1.57 ng/mL     Troponin [[id number]]  (Normal) Collected:  03/11/17 1408    Specimen:  Blood from Arm, Left Updated:  03/11/17 1442     Troponin I <0.012 ng/mL     [place] Draw [[id number]] Collected:  03/11/17 1122    Specimen:  Blood Updated:  03/11/17 1601    Narrative:       The following orders were created for panel order [place] Draw.  Procedure                               Abnormality         Status                     ---------                               -----------         ------                     Light Blue Top[[id number]]                                    Final result               Green Top (Gel)[[id number]]                                   Final result               Lavender Top[[id number]]                                      Final result               Gold Top - SST[[id number]]                                    Final result                 Please view results for these tests on the individual orders.    Light Blue Top [[id number]] Collected:  03/11/17 1122    Specimen:  Blood Updated:  03/11/17 1601     Extra Tube hold for add-on       Auto resulted       Green Top (Gel) [[id number]] Collected:  03/11/17 1122    Specimen:  Blood Updated:  03/11/17 1601     Extra Tube Hold for add-ons.       Auto resulted.       Lavender Top [[id number]] Collected:  03/11/17 1122    Specimen:  Blood Updated:  03/11/17 1601     Extra Tube hold for add-on       Auto resulted       Gold Top - SST [[id number]] Collected:  03/11/17 1122    Specimen:   Blood Updated:  03/11/17 1601     Extra Tube Hold for add-ons.       Auto resulted.       TSH [[id number]] Collected:  03/11/17 1704    Specimen:  Blood Updated:  03/11/17 1708    Hemoglobin A1c [[id number]] Collected:  03/11/17 1704    Specimen:  Blood Updated:  03/11/17 1708    CK [[id number]]  (Normal) Collected:  03/11/17 1704    Specimen:  Blood Updated:  03/11/17 1720     Creatine Kinase 43 U/L     Troponin [[id number]]  (Normal) Collected:  03/11/17 1704    Specimen:  Blood Updated:  03/11/17 1733     Troponin I <0.012 ng/mL     CK-MB [[id number]]  (Normal) Collected:  03/11/17 1704    Specimen:  Blood Updated:  03/11/17 1733     CKMB 1.24 ng/mL         Imaging Results (last 24 hours)     Procedure Component Value Units Date/Time    XR Chest 2 View [[id number]] Collected:  03/11/17 1159     Updated:  03/11/17 1204    Narrative:       Patient Name:  MRS. CHIQUITA BAILEY  Patient ID:  [id number]   Ordering:  ROBIN MONTENEGRO  Attending:  ROBIN MONTENEGRO  Referring:  ROBIN MONTENEGRO  ------------------------------------------------  PROCEDURE: XR CHEST 2 VIEWS    INDICATION FOR PROCEDURE:  57 years -old patient presents for  evaluation of chest pain.    COMPARISON:  November 11, 2016.    FINDINGS: XR CHEST 2 views  reveal the lungs are expanded.  Patient has had a sternotomy. Cardiac monitoring leads are  present. There is moderate elevation of the right hemidiaphragm.    There is no radiographic evidence for airspace consolidation,  pleural effusion or pneumothorax. Mediastinal and cardiac  silhouettes are within normal limits.     The bones are osteopenic. There is mild curvature of the thoracic  spine convexity towards the right. There are mild degenerative  changes at both shoulders.     Surgical clips are visible in the right upper quadrant.      Impression:       No radiographic evidence of acute cardiopulmonary  disease.    Electronically signed by:  Hayley Gamboa MD  3/11/2017 12:03 PM [facility]  Workstation:  [email or web address]            Assessment:    Hospital Problem List     Precordial pain      Coronary artery disease        Plan:  Due to patient's multiple risk factors, we will obtain a cardiology consult; continue trend cardiac enzymes and EKG; continue treat as hospital course dictates.      CHELSEA Garcia  03/11/17  5:51 PM

## 2017-03-11 NOTE — ED PROVIDER NOTES
Subjective   HPI Comments: Chest pain started last night and worsened this morning. CABG in 2005, no stents since CABG    Patient is a 57 y.o. female presenting with chest pain.   Chest Pain   Pain location:  Substernal area  Pain quality: aching and crushing    Pain radiates to:  L arm and upper back  Pain severity:  Moderate  Onset quality:  Sudden  Duration:  5 hours  Timing:  Constant  Progression:  Waxing and waning  Chronicity:  New  Relieved by:  Nothing  Worsened by:  Nothing  Ineffective treatments:  Aspirin  Associated symptoms: back pain (chronic), dizziness and nausea    Associated symptoms: no abdominal pain, no anorexia, no cough, no diaphoresis, no dysphagia, no fatigue, no fever, no headache, no lower extremity edema, no near-syncope, no numbness, no orthopnea, no palpitations, no shortness of breath, no syncope, no vomiting and no weakness    Risk factors: coronary artery disease, diabetes mellitus, high cholesterol, hypertension and obesity    Risk factors: not male and no smoking        Review of Systems   Constitutional: Negative for appetite change, chills, diaphoresis, fatigue and fever.   HENT: Negative for congestion, ear discharge, ear pain, nosebleeds, rhinorrhea, sinus pressure, sore throat and trouble swallowing.    Eyes: Negative for discharge and redness.   Respiratory: Negative for apnea, cough, chest tightness, shortness of breath and wheezing.    Cardiovascular: Positive for chest pain. Negative for palpitations, orthopnea, syncope and near-syncope.   Gastrointestinal: Positive for nausea. Negative for abdominal pain, anorexia, diarrhea and vomiting.   Endocrine: Negative for polyuria.   Genitourinary: Negative for dysuria, frequency and urgency.   Musculoskeletal: Positive for back pain (chronic). Negative for myalgias and neck pain.   Skin: Negative for color change and rash.   Allergic/Immunologic: Negative for immunocompromised state.   Neurological: Positive for dizziness.  Negative for seizures, syncope, weakness, light-headedness, numbness and headaches.   Hematological: Negative for adenopathy. Does not bruise/bleed easily.   Psychiatric/Behavioral: Negative for behavioral problems and confusion.   All other systems reviewed and are negative.      Past Medical History   Diagnosis Date   • Acquired hypothyroidism    • Angina, class IV    • Anxiety    • Benign paroxysmal positional vertigo    • Carpal tunnel syndrome    • Chronic pain    • Coronary atherosclerosis    • Depression    • Diabetes mellitus      Type 2, controlled   • Diabetic polyneuropathy    • Female stress incontinence    • Hashimoto's thyroiditis    • Hyperlipidemia    • Hypertension    • Low back pain    • Malaise and fatigue    • Multiple joint pain    • Obesity      Refuses to be weighed   • Otalgia      Both   • Perforation of tympanic membrane      Left   • Postoperative wound infection    • Vitamin D deficiency        Allergies   Allergen Reactions   • Seroquel [Quetiapine Fumarate] Anaphylaxis   • Avandia [Rosiglitazone] Swelling   • Morphine And Related Hallucinations     If patient takes to much       Past Surgical History   Procedure Laterality Date   • Abdominal surgery     • Angioplasty       coronary   • Coronary artery bypass graft     • Cardiac catheterization     • Carpal tunnel release     • Cholecystectomy     • Endoscopy and colonoscopy     • Mouth surgery     • Transesophageal echocardiogram (mildred)       With color flow   • Foot surgery       Toes   • Gastric banding       Revision, laparoscopic   • Hysterectomy     • Shoulder surgery     • Salpingo oophorectomy         Family History   Problem Relation Age of Onset   • Diabetes Other    • Heart disease Other    • Hypertension Other        Social History     Social History   • Marital status:      Spouse name: N/A   • Number of children: N/A   • Years of education: N/A     Social History Main Topics   • Smoking status: Never Smoker   •  Smokeless tobacco: Never Used   • Alcohol use No   • Drug use: No   • Sexual activity: Defer     Other Topics Concern   • None     Social History Narrative           Objective   Physical Exam   Constitutional: She is oriented to person, place, and time. She appears well-developed and well-nourished.   HENT:   Head: Normocephalic and atraumatic.   Nose: Nose normal.   Mouth/Throat: Oropharynx is clear and moist.   Eyes: Conjunctivae and EOM are normal. Pupils are equal, round, and reactive to light. Right eye exhibits no discharge. Left eye exhibits no discharge. No scleral icterus.   Neck: Normal range of motion. Neck supple. No tracheal deviation present.   Cardiovascular: Normal rate, regular rhythm and normal heart sounds.    No murmur heard.  Pulmonary/Chest: Effort normal and breath sounds normal. No stridor. No respiratory distress. She has no wheezes. She has no rales.   Abdominal: Soft. Bowel sounds are normal. She exhibits no distension and no mass. There is no tenderness. There is no rebound and no guarding.   Musculoskeletal: She exhibits no edema.   Neurological: She is alert and oriented to person, place, and time. Coordination normal.   Skin: Skin is warm and dry. No rash noted. No erythema.   Psychiatric: She has a normal mood and affect. Her behavior is normal. Thought content normal.   Nursing note and vitals reviewed.      Procedures         ED Course  ED Course          Labs Reviewed   COMPREHENSIVE METABOLIC PANEL - Abnormal; Notable for the following:        Result Value    Glucose 302 (*)     Anion Gap 17.0 (*)     All other components within normal limits   TROPONIN (IN-HOUSE) - Normal   BNP (IN-HOUSE) - Normal   PROTIME-INR - Normal    Narrative:     Therapeutic range for most indications is 2.0-3.0 INR,  or 2.5-3.5 for mechanical heart valves.   CBC WITH AUTO DIFFERENTIAL - Normal   CK - Normal   CK MB - Normal   LIPASE - Normal   RAINBOW DRAW    Narrative:     The following orders were  created for panel order [place] Draw.  Procedure                               Abnormality         Status                     ---------                               -----------         ------                     Light Blue Top[[id number]]                                    In process                 Green Top (Gel)[[id number]]                                   In process                 Lavender Top[[id number]]                                      In process                 Gold Top - SST[[id number]]                                    In process                   Please view results for these tests on the individual orders.   TROPONIN (IN-HOUSE)   CBC AND DIFFERENTIAL    Narrative:     The following orders were created for panel order CBC & Differential.  Procedure                               Abnormality         Status                     ---------                               -----------         ------                     CBC Auto Differential[[id number]]         Normal              Final result                 Please view results for these tests on the individual orders.   LIGHT BLUE TOP   GREEN TOP   LAVENDER TOP   GOLD TOP - SST       XR Chest 2 View   Final Result   No radiographic evidence of acute cardiopulmonary   disease.      Electronically signed by:  Hayley Gamboa MD  3/11/2017 12:03 PM [facility]   [email or web address]    Final diagnoses:   Precordial pain            Jordy Godfrey MD  03/11/17 5157

## 2017-03-12 VITALS
TEMPERATURE: 97.1 F | SYSTOLIC BLOOD PRESSURE: 118 MMHG | OXYGEN SATURATION: 93 % | RESPIRATION RATE: 18 BRPM | HEART RATE: 76 BPM | HEIGHT: 68 IN | BODY MASS INDEX: 37.13 KG/M2 | DIASTOLIC BLOOD PRESSURE: 67 MMHG | WEIGHT: 245 LBS

## 2017-03-12 LAB
ANION GAP SERPL CALCULATED.3IONS-SCNC: 13 MMOL/L (ref 5–15)
ARTICHOKE IGE QN: 103 MG/DL (ref 1–129)
BUN BLD-MCNC: 21 MG/DL (ref 7–21)
BUN/CREAT SERPL: 22.1 (ref 7–25)
CALCIUM SPEC-SCNC: 8.8 MG/DL (ref 8.4–10.2)
CHLORIDE SERPL-SCNC: 97 MMOL/L (ref 95–110)
CHOLEST SERPL-MCNC: 202 MG/DL (ref 0–199)
CO2 SERPL-SCNC: 28 MMOL/L (ref 22–31)
CREAT BLD-MCNC: 0.95 MG/DL (ref 0.5–1)
DEPRECATED RDW RBC AUTO: 40 FL (ref 36.4–46.3)
ERYTHROCYTE [DISTWIDTH] IN BLOOD BY AUTOMATED COUNT: 13.2 % (ref 11.5–14.5)
GFR SERPL CREATININE-BSD FRML MDRD: 61 ML/MIN/1.73 (ref 60–120)
GLUCOSE BLD-MCNC: 237 MG/DL (ref 60–100)
GLUCOSE BLDC GLUCOMTR-MCNC: 251 MG/DL (ref 70–130)
HCT VFR BLD AUTO: 37.2 % (ref 35–45)
HDLC SERPL-MCNC: 43 MG/DL (ref 60–200)
HGB BLD-MCNC: 12.8 G/DL (ref 12–15.5)
LDLC/HDLC SERPL: 2.35 {RATIO} (ref 0–3.22)
MCH RBC QN AUTO: 29 PG (ref 26.5–34)
MCHC RBC AUTO-ENTMCNC: 34.4 G/DL (ref 31.4–36)
MCV RBC AUTO: 84.2 FL (ref 80–98)
PLATELET # BLD AUTO: 386 10*3/MM3 (ref 150–450)
PMV BLD AUTO: 9.6 FL (ref 8–12)
POTASSIUM BLD-SCNC: 3.5 MMOL/L (ref 3.5–5.1)
RBC # BLD AUTO: 4.42 10*6/MM3 (ref 3.77–5.16)
SODIUM BLD-SCNC: 138 MMOL/L (ref 137–145)
TRIGL SERPL-MCNC: 290 MG/DL (ref 20–199)
TROPONIN I SERPL-MCNC: <0.012 NG/ML
TROPONIN I SERPL-MCNC: <0.012 NG/ML
WBC NRBC COR # BLD: 9.72 10*3/MM3 (ref 3.2–9.8)

## 2017-03-12 PROCEDURE — 82962 GLUCOSE BLOOD TEST: CPT

## 2017-03-12 PROCEDURE — 25010000002 ONDANSETRON PER 1 MG: Performed by: FAMILY MEDICINE

## 2017-03-12 PROCEDURE — 85027 COMPLETE CBC AUTOMATED: CPT | Performed by: PHYSICIAN ASSISTANT

## 2017-03-12 PROCEDURE — G0378 HOSPITAL OBSERVATION PER HR: HCPCS

## 2017-03-12 PROCEDURE — 80061 LIPID PANEL: CPT | Performed by: FAMILY MEDICINE

## 2017-03-12 PROCEDURE — 96376 TX/PRO/DX INJ SAME DRUG ADON: CPT

## 2017-03-12 PROCEDURE — 80048 BASIC METABOLIC PNL TOTAL CA: CPT | Performed by: PHYSICIAN ASSISTANT

## 2017-03-12 PROCEDURE — 63710000001 INSULIN ASPART PER 5 UNITS: Performed by: FAMILY MEDICINE

## 2017-03-12 PROCEDURE — 84484 ASSAY OF TROPONIN QUANT: CPT | Performed by: FAMILY MEDICINE

## 2017-03-12 PROCEDURE — 93010 ELECTROCARDIOGRAM REPORT: CPT | Performed by: INTERNAL MEDICINE

## 2017-03-12 PROCEDURE — 93005 ELECTROCARDIOGRAM TRACING: CPT | Performed by: PHYSICIAN ASSISTANT

## 2017-03-12 RX ADMIN — PENTOXIFYLLINE 400 MG: 400 TABLET, FILM COATED, EXTENDED RELEASE ORAL at 08:44

## 2017-03-12 RX ADMIN — VENLAFAXINE HYDROCHLORIDE 150 MG: 75 CAPSULE, EXTENDED RELEASE ORAL at 08:44

## 2017-03-12 RX ADMIN — GABAPENTIN 600 MG: 300 CAPSULE ORAL at 06:12

## 2017-03-12 RX ADMIN — LISINOPRIL 2.5 MG: 2.5 TABLET ORAL at 08:44

## 2017-03-12 RX ADMIN — PANTOPRAZOLE SODIUM 40 MG: 40 TABLET, DELAYED RELEASE ORAL at 08:44

## 2017-03-12 RX ADMIN — CLOPIDOGREL BISULFATE 75 MG: 75 TABLET ORAL at 08:44

## 2017-03-12 RX ADMIN — INSULIN ASPART 8 UNITS: 100 INJECTION, SOLUTION INTRAVENOUS; SUBCUTANEOUS at 06:39

## 2017-03-12 RX ADMIN — ASPIRIN 81 MG 81 MG: 81 TABLET ORAL at 08:44

## 2017-03-12 RX ADMIN — FUROSEMIDE 40 MG: 40 TABLET ORAL at 08:44

## 2017-03-12 RX ADMIN — RANOLAZINE 1000 MG: 500 TABLET, FILM COATED, EXTENDED RELEASE ORAL at 08:44

## 2017-03-12 RX ADMIN — ARIPIPRAZOLE 15 MG: 15 TABLET ORAL at 08:44

## 2017-03-12 RX ADMIN — FENOFIBRATE 145 MG: 145 TABLET ORAL at 08:44

## 2017-03-12 RX ADMIN — Medication 10 ML: at 08:35

## 2017-03-12 RX ADMIN — HYDROCODONE BITARTRATE AND ACETAMINOPHEN 1 TABLET: 7.5; 325 TABLET ORAL at 08:34

## 2017-03-12 RX ADMIN — ONDANSETRON 4 MG: 2 INJECTION INTRAMUSCULAR; INTRAVENOUS at 08:34

## 2017-03-12 NOTE — PLAN OF CARE
Problem: Patient Care Overview (Adult)  Goal: Plan of Care Review  Outcome: Ongoing (interventions implemented as appropriate)    03/11/17 1900   Coping/Psychosocial Response Interventions   Plan Of Care Reviewed With patient   Patient Care Overview   Progress no change   Outcome Evaluation   Outcome Summary/Follow up Plan pt has constant chest pain, VAS =6/10. medicating as needed.       Goal: Adult Individualization and Mutuality  Outcome: Ongoing (interventions implemented as appropriate)  Goal: Discharge Needs Assessment  Outcome: Ongoing (interventions implemented as appropriate)    Problem: Acute Coronary Syndrome (ACS) (Adult)  Goal: Signs and Symptoms of Listed Potential Problems Will be Absent or Manageable (Acute Coronary Syndrome)  Outcome: Ongoing (interventions implemented as appropriate)    Problem: Pain, Chronic (Adult)  Goal: Identify Related Risk Factors and Signs and Symptoms  Outcome: Ongoing (interventions implemented as appropriate)  Goal: Acceptable Pain Control/Comfort Level  Outcome: Ongoing (interventions implemented as appropriate)

## 2017-03-12 NOTE — CONSULTS
Patient Name: Ariana Martinez  Age/Sex: 57 y.o. female  : 1960  MRN: [id number]    Date of Hospital Visit: 3/11/2017  Encounter Provider: Naomi Moulton MD  Referring Provider: No ref. provider found         Subjective:       Chief Complaint: Chest Pain    History of Present Illness:  Ariana Martinez is a 57 y.o. female presented to the hospital with chief complaint of chest pain.  Patient stated the chest pain started while she was at rest.  She states it is localized in the retrosternal area.  She states that it's tightness and squeezing sensation that comes and goes.  It lasts between 1 and 2 minutes each time.  It is not associated with shortness of breath and diaphoresis nausea or vomiting.  Patient stated that the chest discomfort of tightness did not radiate to her back to her shoulder or upper extremity.      Last Echo:  2016 left ventricular ejection fraction was 50%    Last  nuclear perfusion imaging stress test: May 7, 2015 which revealed a fixed perfusion defect in the anterior wall from the apex to mid segment and a large fixed perfusion defect along the inferior wall.      Past Medical History:  Past Medical History   Diagnosis Date   • Acquired hypothyroidism    • Angina, class IV    • Anxiety    • Benign paroxysmal positional vertigo    • Carpal tunnel syndrome    • Chronic pain    • Coronary atherosclerosis    • Depression    • Diabetes mellitus      Type 2, controlled   • Diabetic polyneuropathy    • Female stress incontinence    • Hashimoto's thyroiditis    • Hyperlipidemia    • Hypertension    • Low back pain    • Malaise and fatigue    • Multiple joint pain    • Obesity      Refuses to be weighed   • Otalgia      Both   • Perforation of tympanic membrane      Left   • Postoperative wound infection    • Vitamin D deficiency        Past Surgical History   Procedure Laterality Date   • Abdominal surgery     • Angioplasty       coronary   • Coronary artery bypass graft      • Cardiac catheterization     • Carpal tunnel release     • Cholecystectomy     • Endoscopy and colonoscopy     • Mouth surgery     • Transesophageal echocardiogram (mildred)       With color flow   • Foot surgery       Toes   • Gastric banding       Revision, laparoscopic   • Hysterectomy     • Shoulder surgery     • Salpingo oophorectomy         Home Medications:    Prescriptions Prior to Admission   Medication Sig Dispense Refill Last Dose   • ARIPiprazole (ABILIFY) 15 MG tablet TAKE 1 TABLET BY MOUTH DAILY. 30 tablet 5 3/11/2017 at 0900   • aspirin 81 MG chewable tablet Chew 81 mg daily.   3/11/2017 at 0900   • atorvastatin (LIPITOR) 40 MG tablet Take 40 mg by mouth Every Evening.   3/10/2017 at 2100   • Calcium Citrate-Vitamin D (CITRACAL/VITAMIN D) 250-200 MG-UNIT tablet Take 2 tablets by mouth 2 (two) times a day.   3/11/2017 at 0900   • clopidogrel (PLAVIX) 75 MG tablet Take 1 tablet by mouth Daily. 90 tablet 3 3/11/2017 at 0900   • fenofibrate (TRICOR) 145 MG tablet Take 1 tablet by mouth Daily. 30 tablet 5 3/11/2017 at 0900   • furosemide (LASIX) 40 MG tablet Take 1 tablet by mouth Daily. 90 tablet 3 3/11/2017 at 0900   • gabapentin (NEURONTIN) 600 MG tablet Take 600 mg by mouth 3 (three) times a day.   3/11/2017 at 0900   • hydrochlorothiazide (MICROZIDE) 12.5 MG capsule Take 12.5 mg by mouth Daily. Pt takes for Meniere's Disease.   3/11/2017 at 0900   • HYDROcodone-acetaminophen (NORCO) 7.5-325 MG per tablet Take 1 tablet by mouth Every 4 (Four) Hours As Needed for moderate pain (4-6). 180 tablet 0 3/10/2017 at 2100   • insulin aspart (NOVOLOG FLEXPEN) 100 UNIT/ML solution pen-injector sc pen Inject 40 units with meals 30 mL 3 3/11/2017 at 0900   • Insulin Degludec 100 UNIT/ML solution pen-injector Inject 70 Units under the skin Daily. 9 pen 3 3/10/2017 at 2100   • lisinopril (PRINIVIL,ZESTRIL) 2.5 MG tablet Take 1 tablet by mouth daily. 90 tablet 3 3/11/2017 at 0900   • LORazepam (ATIVAN) 2 MG tablet Take  "1 tablet by mouth 2 (Two) Times a Day As Needed for anxiety. 60 tablet 2 3/11/2017 at 0900   • mirtazapine (REMERON) 45 MG tablet TAKE 1 TABLET BY MOUTH EVERY DAY AT BEDTIME 30 tablet 5 Past Week at Unknown time   • nabumetone (RELAFEN) 500 MG tablet Take 1 tablet by mouth 2 (Two) Times a Day As Needed for mild pain (1-3). 60 tablet 5 3/11/2017 at 0900   • pantoprazole (PROTONIX) 40 MG EC tablet Take 1 tablet by mouth daily. 90 tablet 3 3/11/2017 at 0900   • pentoxifylline (TRENTal) 400 MG CR tablet Take 400 mg by mouth 3 (three) times a day with meals.   3/11/2017 at 0900   • ranolazine (RANEXA) 500 MG 12 hr tablet Take 1,000 mg by mouth 2 (Two) Times a Day.   3/11/2017 at 0900   • venlafaxine XR (EFFEXOR-XR) 150 MG 24 hr capsule TAKE ONE CAPSULE BY MOUTH TWICE A DAY FOR DEPRESSION 180 capsule 3 3/11/2017 at 0900   • B-D ULTRAFINE III SHORT PEN 31G X 8 MM misc USE AS DIRECTED 4 TIMES DAILY 150 each 2 Taking   • ezetimibe (ZETIA) 10 MG tablet Take 1 tablet by mouth Daily. (Patient taking differently: Take 10 mg by mouth Daily. Not taking per patient) 30 tablet 11 Unknown at Unknown time   • glucagon (GLUCAGON EMERGENCY) 1 MG injection Inject 1 mg under the skin 1 (one) time as needed for low blood sugar.   Taking   • Insulin Syringe 29G X 1/2\" 0.3 ML misc Use 3 times a day 270 each 3 Taking   • KETOCARE strip USE 1 STRIP TO CHECK FOR KETONES IF BLOOD SUGAR IS GREATER THAN 250  99 Taking   • ondansetron (ZOFRAN) 8 MG tablet Take 1 tablet by mouth every 8 (eight) hours. 90 tablet 3 Unknown at Unknown time   • ONE TOUCH ULTRA TEST test strip USE TO TEST SUGAR 4 TIMES A  each 1 Taking   • vitamin D (ERGOCALCIFEROL) 06150 UNITS capsule capsule TAKE ONE CAPSULE BY MOUTH EVERY SUNDAY  2 3/5/2017       Allergies:  Allergies   Allergen Reactions   • Seroquel [Quetiapine Fumarate] Anaphylaxis   • Avandia [Rosiglitazone] Swelling   • Morphine And Related Hallucinations     If patient takes to much       Past Social " History:  Social History     Social History   • Marital status:      Spouse name: N/A   • Number of children: N/A   • Years of education: N/A     Social History Main Topics   • Smoking status: Never Smoker   • Smokeless tobacco: Never Used   • Alcohol use No   • Drug use: No   • Sexual activity: Defer     Other Topics Concern   • None     Social History Narrative           Review of Systems:   Constitution: No chills, no rigors, no unexplained weight loss or weight gain  Eyes:  No diplopia, no blurred vision, no loss of vision, conjunctiva is pink and sclera is anicteric  ENT:  No tinnitus, no otorrhea, no epistaxis, no sore throat   Respiratory: No cough, no hemoptysis  Cardiovascular:  Chest pain, no orthopnea, no PND, no palpitations.   Gastrointestinal: No nausea, no vomiting, no hematemesis, no diarrhea or constipation, no melena  Genitourinary: No frequency of dysuria no hematuria  Integument: positive for  No pruritis and  no skin rash  Hematologic / Lymphatic: No excessive bleeding, easy bruising, fatigue, lymphadenopathy and petechiae  Musculoskeletal: No joint pain, joint stiffness, joint swelling, muscle pain, muscle weakness and neck pain  Neurological: No dizziness, headaches, light headedness, seizures and vertigo  Behavioral/Psych: No depression, homicidal ideations and suicidal ideations  Endocrine: No frequent urination and nocturia, temperature intolerance, weight gain, unintended and weight loss, unintended      Objective:     Objective:  Vital Signs (last 24 hours)       03/10 0700  -  03/11 0659 03/11 0700  -  03/11 1939   Most Recent    Temp (°F)   98 -  98.1     98.1 (36.7)    Heart Rate   71 -  109     71    Resp   16 -  18     18    BP   112/60 -  169/79     142/67    SpO2 (%)   91 -  97     96          Body mass index is 37.25 kg/(m^2).  Weight change:           Physical Exam:   General Appearance:    Alert, oriented, cooperative, in no acute distress   Head:    Normocephalic,  atraumatic, without obvious abnormality   Eyes:            Lids and lashes normal, conjunctivae and sclerae normal, no   icterus, no pallor   Ears:    Ears appear intact with no abnormalities noted   Throat:   Mucous membranes pink and moist   Neck:   Supple, trachea midline, no carotid bruit, no JVD   Lungs:     Clear to auscultation, respirations regular, even and                   Unlabored. No wheezes, rales, rhonchi    Heart:    Regular rhythm and normal rate, normal S1 and S2, no            murmur, no gallop, no rub, no click   Abdomen:     Normal bowel sounds, no masses, liver and spleen nonpalpable, soft, non-tender, non-distended, no guarding, no rebound tenderness   Genitalia:    Deferred   Extremities:   Moves all extremities well, no edema, no cyanosis, no              Redness, no clubbing   Pulses:   Pulses palpable and equal bilaterally   Skin:   No bleeding, bruising or rash   Neurologic:   Alert and oriented to person, place, and time. No focal neurological deficits       Lab Review:       Results from last 7 days  Lab Units 03/11/17  1122   SODIUM mmol/L 138   POTASSIUM mmol/L 3.6   CHLORIDE mmol/L 97   TOTAL CO2 mmol/L 24.0   BUN mg/dL 21   CREATININE mg/dL 0.99   CALCIUM mg/dL 9.9   BILIRUBIN mg/dL 0.8   ALK PHOS U/L 84   ALT (SGPT) U/L 46   AST (SGOT) U/L 25   GLUCOSE mg/dL 302*       Results from last 7 days  Lab Units 03/11/17  1704 03/11/17  1408 03/11/17  1122   CK TOTAL U/L 43  --  57   TROPONIN I ng/mL <0.012 <0.012 <0.012           Results from last 7 days  Lab Units 03/11/17  1122   WBC 10*3/mm3 8.61   HEMOGLOBIN g/dL 13.6   HEMATOCRIT % 39.6   PLATELETS 10*3/mm3 446       Results from last 7 days  Lab Units 03/11/17  1122   INR  0.94               Results from last 7 days  Lab Units 03/11/17  1704   TSH mIU/mL 4.870*       EKG:   ECG/EMG Results (last 24 hours)     Procedure Component Value Units Date/Time    ECG 12 Lead [[id number]] Collected:  03/11/17 1054     Updated:  03/11/17 3830           Imaging:  Imaging Results (last 24 hours)     Procedure Component Value Units Date/Time    XR Chest 2 View [[id number]] Collected:  03/11/17 1159     Updated:  03/11/17 1204    Narrative:       Patient Name:  MRS. CHIQUITA BAILEY  Patient ID:  [id number]   Ordering:  ROBIN MONTENEGRO  Attending:  ROBIN MONTENEGRO  Referring:  ROBIN MONTENEGRO  ------------------------------------------------  PROCEDURE: XR CHEST 2 VIEWS    INDICATION FOR PROCEDURE:  57 years -old patient presents for  evaluation of chest pain.    COMPARISON:  November 11, 2016.    FINDINGS: XR CHEST 2 views  reveal the lungs are expanded.  Patient has had a sternotomy. Cardiac monitoring leads are  present. There is moderate elevation of the right hemidiaphragm.    There is no radiographic evidence for airspace consolidation,  pleural effusion or pneumothorax. Mediastinal and cardiac  silhouettes are within normal limits.     The bones are osteopenic. There is mild curvature of the thoracic  spine convexity towards the right. There are mild degenerative  changes at both shoulders.     Surgical clips are visible in the right upper quadrant.      Impression:       No radiographic evidence of acute cardiopulmonary  disease.    Electronically signed by:  Hayley Gamboa MD  3/11/2017 12:03 PM CST  Workstation: [employer] personally viewed and interpreted the patient's EKG/Telemetry data.    Assessment:     Active Problems:  1.  Chest pain: Myocardial infarction is ruled out with serial cardiac enzymes  2.  Coronary artery disease status post bypass surgery 2005 with LIMA to the LAD.  · 2.5 x 28 mm Cypher stent to the LAD in 2004  · Last cardiac catheterization was June 20, 2016 by Dr. Colbert which revealed 100% occlusion of the left anterior descending coronary artery after the origin of the first major diagonal branch.  The left internal mammary artery to the LAD was noted to be patent.  Intermediate lesion was noted in the obtuse  marginal branch of the left circumflex.  FFR of the obtuse marginal branch revealed hemodynamically not significant with an FFR of 0.94.  The proximal right coronary artery was noted to be patent with approximately 50-60% stenosis.  At this point the recommendation by Dr. Colbert is to continue with guideline direct medical therapy and risk factor modification.  3.  Obesity  4.  Hyperlipidemia        Plan:     ·  Patient's current chest discomfort appears to be noncardiac in nature.  ·  Recommend risk factor modification and continued medical therapy.  ·  I have counseled patient to engage in a regular exercise program and diet modification.  · Patient can follow up with Dr. Colbert as an outpatient.  · I do not plan any further invasive workup at this time.      Naomi Moulton MD  03/11/17  7:39 PM     EMR Dragon/Transcription disclaimer:   Much of this encounter note is an electronic transcription/translation of spoken language to printed text. The electronic translation of spoken language may permit erroneous, or at times, nonsensical words or phrases to be inadvertently transcribed; Although I have reviewed the note for such errors, some may still exist.

## 2017-03-12 NOTE — PLAN OF CARE
Problem: Patient Care Overview (Adult)  Goal: Plan of Care Review  Outcome: Ongoing (interventions implemented as appropriate)    03/12/17 0441   Coping/Psychosocial Response Interventions   Plan Of Care Reviewed With patient   Patient Care Overview   Progress improving       Goal: Adult Individualization and Mutuality  Outcome: Ongoing (interventions implemented as appropriate)  Goal: Discharge Needs Assessment  Outcome: Ongoing (interventions implemented as appropriate)    Problem: Acute Coronary Syndrome (ACS) (Adult)  Goal: Signs and Symptoms of Listed Potential Problems Will be Absent or Manageable (Acute Coronary Syndrome)  Outcome: Ongoing (interventions implemented as appropriate)    Problem: Pain, Chronic (Adult)  Goal: Identify Related Risk Factors and Signs and Symptoms  Outcome: Ongoing (interventions implemented as appropriate)  Goal: Acceptable Pain Control/Comfort Level  Outcome: Ongoing (interventions implemented as appropriate)

## 2017-03-12 NOTE — DISCHARGE SUMMARY
"    [facility] Medicine Services  DISCHARGE SUMMARY       Date of Admission: 3/11/2017  Date of Discharge:  3/12/2017  Primary Care Physician: Francisca Fong MD    Presenting Problem/History of Present Illness:  Precordial pain [R07.2]     Final Discharge Diagnoses:  Hyperlipidemia   Morbid obesity  Uncontrolled DM type 2  Chest pain rule out ACS  Consults:   Consults     Date and Time Order Name Status Description    3/11/2017 1650 Inpatient Consult to Cardiology Completed           Procedures Performed: none                 Pertinent Test Results:   IMPRESSION:  No radiographic evidence of acute cardiopulmonary  disease  Chief Complaint on Day of Discharge: none     Hospital Course:  This 57-year-old  female reported to the emergency department with complaints chest pain. Patient reports that this chest pain did occur sometime last night, it radiated from substernal area down her abdomen, and then it subsided. She then went back to sleep. Patient reports that she woke up around 7 AM today and felt like the pain returned. She stated, \"it felt like someone reached inside me, squeezed, and let go. \"Patient endorses nausea, shortness of air, and denies dyspnea upon exertion. States that when she stands she gets lightheaded, but denies syncope. Denies aggravating or relieving factors. Patient states that 12 years ago she did receive a CABG. Usually sees Dr. Fernandez, but is currently changing services. Patient denies hematuria, hematochezia, melena, hematemesis, syncope, vision changes, headaches, worse headache of life, fever, chills, or recent illness. Patient has been seen in the past by Dr. Fernandez and he continues to recommend medical management            She rule out for ACS she hemoglobin A1c was very elevated ,she is currently under the care of Elvis Chamberlain ,she was instructed to follow/up with her for diabetic control .she also needs to loose some weight . " "Her cholesterol and triglycerides were elevated  She needs to watch her diet     She will see Dr Moulton in his office fora possible stress test  Out patient                                                 Condition on Discharge: stable     Physical Exam on Discharge:  Visit Vitals   • /67 (BP Location: Left arm, Patient Position: Sitting)   • Pulse 76   • Temp 97.1 °F (36.2 °C) (Temporal Artery )   • Resp 18   • Ht 68\" (172.7 cm)   • Wt 245 lb (111 kg)   • SpO2 93%   • BMI 37.25 kg/m2     Physical Exam   Constitutional: She is oriented to person, place, and time. She appears well-developed and well-nourished.  Non-toxic appearance. She does not have a sickly appearance. She does not appear ill. No distress. She is not intubated.   HENT:   Head: Normocephalic and atraumatic.   Right Ear: External ear normal.   Left Ear: External ear normal.   Nose: Nose normal.   Mouth/Throat: Oropharynx is clear and moist. No oropharyngeal exudate.   Eyes: Conjunctivae and EOM are normal. Pupils are equal, round, and reactive to light. Right eye exhibits no discharge and no exudate. Left eye exhibits no discharge and no exudate. Right conjunctiva is not injected. Right conjunctiva has no hemorrhage. Left conjunctiva is not injected. Left conjunctiva has no hemorrhage. No scleral icterus.   Neck: Normal range of motion. Neck supple. No hepatojugular reflux and no JVD present. No tracheal tenderness, no spinous process tenderness and no muscular tenderness present. Carotid bruit is not present. No rigidity. No tracheal deviation, no edema, no erythema and normal range of motion present. No thyroid mass and no thyromegaly present.   Cardiovascular: Normal rate, regular rhythm, normal heart sounds and intact distal pulses.   No extrasystoles are present. Exam reveals no gallop and no friction rub.    No murmur heard.  Pulmonary/Chest: Effort normal and breath sounds normal. No stridor. She is not intubated. No respiratory " distress. She has no decreased breath sounds. She has no wheezes. She has no rhonchi. She has no rales. She exhibits no tenderness.   Abdominal: Soft. Normal appearance and bowel sounds are normal. She exhibits no shifting dullness, no distension, no pulsatile liver, no fluid wave, no abdominal bruit, no ascites, no pulsatile midline mass and no mass. There is no hepatosplenomegaly, splenomegaly or hepatomegaly. There is no tenderness. There is no rigidity, no rebound, no guarding, no CVA tenderness, no tenderness at McBurney's point and negative Coombs's sign. No hernia.   Genitourinary: Rectal exam shows guaiac negative stool.   Musculoskeletal: Normal range of motion. She exhibits no edema, tenderness or deformity.        Right shoulder: She exhibits no swelling.      Skin Integrity  -   She hasno right foot ulcer, non-callous right foot, no right foot warmth and no right foot blister. She has no left foot ulcer, non-callous left foot, no left foot warmth and no left foot blister..  Lymphadenopathy:     She has no cervical adenopathy.     She has no axillary adenopathy.   Neurological: She is alert and oriented to person, place, and time. She has normal strength and normal reflexes. She is not disoriented. She displays no atrophy, no tremor and normal reflexes. No cranial nerve deficit or sensory deficit. She exhibits normal muscle tone. She displays no seizure activity. Coordination and gait normal. She displays no Babinski's sign on the right side. She displays no Babinski's sign on the left side.   Skin: Skin is warm and dry. No abrasion, no bruising, no burn, no ecchymosis, no laceration, no lesion, no purpura and no rash noted. Rash is not macular, not papular, not maculopapular, not nodular, not pustular, not vesicular and not urticarial. She is not diaphoretic. No cyanosis or erythema. No pallor. Nails show no clubbing.   Psychiatric: Judgment and thought content normal. Her mood appears not anxious. Her  affect is not angry, not blunt, not labile and not inappropriate. Her speech is not slurred. She is not agitated, not aggressive, not hyperactive, not slowed, not withdrawn and not combative. Thought content is not paranoid and not delusional. Cognition and memory are not impaired. She does not express impulsivity or inappropriate judgment. She does not exhibit a depressed mood. She expresses no homicidal and no suicidal ideation. She expresses no suicidal plans and no homicidal plans. She is communicative. She exhibits normal recent memory and normal remote memory.           Discharge Disposition:  Home or Self Care    Discharge Medications:   Ariana Martinez   Home Medication Instructions ZOILA:[id number]    Printed on:03/12/17 9055   Medication Information                      ARIPiprazole (ABILIFY) 15 MG tablet  TAKE 1 TABLET BY MOUTH DAILY.             aspirin 81 MG chewable tablet  Chew 81 mg daily.             atorvastatin (LIPITOR) 40 MG tablet  Take 40 mg by mouth Every Evening.             B-D ULTRAFINE III SHORT PEN 31G X 8 MM misc  USE AS DIRECTED 4 TIMES DAILY             Calcium Citrate-Vitamin D (CITRACAL/VITAMIN D) 250-200 MG-UNIT tablet  Take 2 tablets by mouth 2 (two) times a day.             clopidogrel (PLAVIX) 75 MG tablet  Take 1 tablet by mouth Daily.             ezetimibe (ZETIA) 10 MG tablet  Take 1 tablet by mouth Daily.             fenofibrate (TRICOR) 145 MG tablet  Take 1 tablet by mouth Daily.             furosemide (LASIX) 40 MG tablet  Take 1 tablet by mouth Daily.             gabapentin (NEURONTIN) 600 MG tablet  Take 600 mg by mouth 3 (three) times a day.             glucagon (GLUCAGON EMERGENCY) 1 MG injection  Inject 1 mg under the skin 1 (one) time as needed for low blood sugar.             hydrochlorothiazide (MICROZIDE) 12.5 MG capsule  Take 12.5 mg by mouth Daily. Pt takes for Meniere's Disease.             HYDROcodone-acetaminophen (NORCO) 7.5-325 MG per tablet  Take 1  "tablet by mouth Every 4 (Four) Hours As Needed for moderate pain (4-6).             insulin aspart (NOVOLOG FLEXPEN) 100 UNIT/ML solution pen-injector sc pen  Inject 40 units with meals             Insulin Degludec 100 UNIT/ML solution pen-injector  Inject 70 Units under the skin Daily.             Insulin Syringe 29G X 1/2\" 0.3 ML misc  Use 3 times a day             KETOCARE strip  USE 1 STRIP TO CHECK FOR KETONES IF BLOOD SUGAR IS GREATER THAN 250             lisinopril (PRINIVIL,ZESTRIL) 2.5 MG tablet  Take 1 tablet by mouth daily.             LORazepam (ATIVAN) 2 MG tablet  Take 1 tablet by mouth 2 (Two) Times a Day As Needed for anxiety.             mirtazapine (REMERON) 45 MG tablet  TAKE 1 TABLET BY MOUTH EVERY DAY AT BEDTIME             nabumetone (RELAFEN) 500 MG tablet  Take 1 tablet by mouth 2 (Two) Times a Day As Needed for mild pain (1-3).             ondansetron (ZOFRAN) 8 MG tablet  Take 1 tablet by mouth every 8 (eight) hours.             ONE TOUCH ULTRA TEST test strip  USE TO TEST SUGAR 4 TIMES A DAY             pantoprazole (PROTONIX) 40 MG EC tablet  Take 1 tablet by mouth daily.             pentoxifylline (TRENTal) 400 MG CR tablet  Take 400 mg by mouth 3 (three) times a day with meals.             ranolazine (RANEXA) 500 MG 12 hr tablet  Take 1,000 mg by mouth 2 (Two) Times a Day.             venlafaxine XR (EFFEXOR-XR) 150 MG 24 hr capsule  TAKE ONE CAPSULE BY MOUTH TWICE A DAY FOR DEPRESSION             vitamin D (ERGOCALCIFEROL) 65498 UNITS capsule capsule  TAKE ONE CAPSULE BY MOUTH EVERY SUNDAY                 Discharge Diet:   Diet Instructions     Diet: Consistent Carbohydrate; Thin Liquids, No Restrictions       Discharge Diet:  Consistent Carbohydrate   Fluid Consistency:  Thin Liquids, No Restrictions                 Activity at Discharge:   Activity Instructions     Activity as Tolerated                     Discharge Care Plan/Instructions:   Follow /up with DR nilda ERWIN/up with Dr" yoandy     Follow-up Appointments:   Future Appointments  Date Time Provider Department Center   3/17/2017 10:00 AM Can Kwon MD PhD [facility] CD MAD None   4/24/2017 9:15 AM BEBE Prince [facility] END [facility]AD None   5/8/2017 11:15 AM Francisca Fong MD [facility] PC POW None       Test Results Pending at Discharge: none     Naomi Gardner MD  03/12/17  5:50 PM    Time: 30 min

## 2017-03-12 NOTE — PLAN OF CARE
Problem: Patient Care Overview (Adult)  Goal: Plan of Care Review  Outcome: Outcome(s) achieved Date Met:  03/12/17  Goal: Adult Individualization and Mutuality  Outcome: Outcome(s) achieved Date Met:  03/12/17  Goal: Discharge Needs Assessment  Outcome: Outcome(s) achieved Date Met:  03/12/17    Problem: Acute Coronary Syndrome (ACS) (Adult)  Goal: Signs and Symptoms of Listed Potential Problems Will be Absent or Manageable (Acute Coronary Syndrome)  Outcome: Outcome(s) achieved Date Met:  03/12/17    Problem: Pain, Chronic (Adult)  Goal: Identify Related Risk Factors and Signs and Symptoms  Outcome: Outcome(s) achieved Date Met:  03/12/17  Goal: Acceptable Pain Control/Comfort Level  Outcome: Outcome(s) achieved Date Met:  03/12/17

## 2017-03-15 DIAGNOSIS — G89.4 CHRONIC PAIN DISORDER: ICD-10-CM

## 2017-03-15 RX ORDER — HYDROCODONE BITARTRATE AND ACETAMINOPHEN 7.5; 325 MG/1; MG/1
1 TABLET ORAL EVERY 4 HOURS PRN
Qty: 180 TABLET | Refills: 0 | Status: SHIPPED | OUTPATIENT
Start: 2017-03-15 | End: 2017-04-12 | Stop reason: SDUPTHER

## 2017-03-17 ENCOUNTER — OFFICE VISIT (OUTPATIENT)
Dept: CARDIOLOGY | Facility: CLINIC | Age: 57
End: 2017-03-17

## 2017-03-17 VITALS
SYSTOLIC BLOOD PRESSURE: 120 MMHG | HEIGHT: 68 IN | WEIGHT: 251.3 LBS | BODY MASS INDEX: 38.09 KG/M2 | OXYGEN SATURATION: 98 % | DIASTOLIC BLOOD PRESSURE: 80 MMHG | HEART RATE: 97 BPM

## 2017-03-17 DIAGNOSIS — I10 ESSENTIAL HYPERTENSION: ICD-10-CM

## 2017-03-17 DIAGNOSIS — I34.0 NON-RHEUMATIC MITRAL REGURGITATION: ICD-10-CM

## 2017-03-17 DIAGNOSIS — R07.89 OTHER CHEST PAIN: ICD-10-CM

## 2017-03-17 DIAGNOSIS — I36.1 NON-RHEUMATIC TRICUSPID VALVE INSUFFICIENCY: ICD-10-CM

## 2017-03-17 DIAGNOSIS — I25.119 CORONARY ARTERY DISEASE INVOLVING NATIVE CORONARY ARTERY OF NATIVE HEART WITH ANGINA PECTORIS (HCC): Primary | ICD-10-CM

## 2017-03-17 DIAGNOSIS — E78.2 MIXED HYPERLIPIDEMIA: ICD-10-CM

## 2017-03-17 PROCEDURE — 99203 OFFICE O/P NEW LOW 30 MIN: CPT | Performed by: INTERNAL MEDICINE

## 2017-03-17 RX ORDER — RANOLAZINE 500 MG/1
1000 TABLET, EXTENDED RELEASE ORAL 2 TIMES DAILY
Qty: 120 TABLET | Refills: 6 | Status: SHIPPED | OUTPATIENT
Start: 2017-03-17 | End: 2017-10-30 | Stop reason: SDUPTHER

## 2017-03-17 RX ORDER — CARVEDILOL 3.12 MG/1
TABLET ORAL
Refills: 12 | COMMUNITY
Start: 2017-02-09 | End: 2017-04-11

## 2017-03-17 NOTE — PROGRESS NOTES
Cardiovascular Medicine   Can Kwon M.D., Ph.D., [facility]    Thank you for asking me to see Ariana Martinez for establishing care.    History of Present Illness  This is a 57 y.o. female with:  1. MV ASCAD  A. CABG, 2005  -LIMA-LAD: Prior PCI 2.5 x 28mm in 2004  -RCA, 60%  B. LHC, 6/2016  -pLAD: 50%  -Ostial D1-30%  -mLAD: 100%  -dLAD fills via LIMA  -OM1: 60%  -FFR was 0.93  2. HTN  3. HLD  4. DM2  5. Mild MR/TR    ASCAD  The pt was diagnosed with CAD in 2004 and underwent PCI to LAD. She had subsequent restenosis and went for a LIMA to LAD in 2005. She  Had recurrent pain in summer of 2016. Repeat LHC revealed patent LIMA and a 60% OM1 lesion with a FFR of 0.93. She has been maintained on OMT. She is currently on ASA, Plavix, Coreg and Lipitor. She is not current on a nitro agent. She did have some left shoulder pain and neck pain last week. She did have CP that started the episode. She was admitted and r/o for MI. She has had slight recurrence.     HTN  Concerning the patient's hypertension, the patient is currently taking Lisinopril and HCTZ.  The patient is medication compliant.  Denies side effects.  Patient's laboratory evaluations are followed closely by the PCP.      HLD  Concerning the patient's hyperlipidemia, the patient remains on a statin.  They are tolerating this very well.  Denies side effects.  Specifically, the patient denies any prior history of asymptomatic LFT elevation, myositis or myalgias.  Patient's laboratory evaluations are followed closely by their PCP.    Mild MR/TR  Dr. Fernandez read her last TTE as mild MR and TR.     LE pain  The patient has type 2 diabetes is being complicated by neuropathy.  She's also had lower extremity pain in her calf that worsen with walking.  She was evaluated by Dr. Hamman.  She's had normal ABIs as well as normal stress ABIs.  For unclear reasons, her primary cardiologist placed her on Trental.  She's continued to have lower extremity pain.  Pulses  have been normal.    Review of Systems - History obtained from chart review and the patient  General ROS: negative  Respiratory ROS: positive for - chest pain    family history includes Diabetes in her other; Heart disease in her other; Hypertension in her other.     reports that she has never smoked. She has never used smokeless tobacco. She reports that she does not drink alcohol or use illicit drugs.    Allergies   Allergen Reactions   • Seroquel [Quetiapine Fumarate] Anaphylaxis   • Avandia [Rosiglitazone] Swelling   • Morphine And Related Hallucinations     If patient takes to much         Current Outpatient Prescriptions:   •  ARIPiprazole (ABILIFY) 15 MG tablet, TAKE 1 TABLET BY MOUTH DAILY., Disp: 30 tablet, Rfl: 5  •  aspirin 81 MG chewable tablet, Chew 81 mg daily., Disp: , Rfl:   •  atorvastatin (LIPITOR) 40 MG tablet, Take 40 mg by mouth Every Evening., Disp: , Rfl:   •  B-D ULTRAFINE III SHORT PEN 31G X 8 MM misc, USE AS DIRECTED 4 TIMES DAILY, Disp: 150 each, Rfl: 2  •  Calcium Citrate-Vitamin D (CITRACAL/VITAMIN D) 250-200 MG-UNIT tablet, Take 2 tablets by mouth 2 (two) times a day., Disp: , Rfl:   •  carvedilol (COREG) 3.125 MG tablet, TAKE 1 TABLET BY MOUTH TWICE A DAY, Disp: , Rfl: 12  •  clopidogrel (PLAVIX) 75 MG tablet, Take 1 tablet by mouth Daily., Disp: 90 tablet, Rfl: 3  •  ezetimibe (ZETIA) 10 MG tablet, Take 1 tablet by mouth Daily. (Patient taking differently: Take 10 mg by mouth Daily. Not taking per patient), Disp: 30 tablet, Rfl: 11  •  fenofibrate (TRICOR) 145 MG tablet, Take 1 tablet by mouth Daily., Disp: 30 tablet, Rfl: 5  •  furosemide (LASIX) 40 MG tablet, Take 1 tablet by mouth Daily., Disp: 90 tablet, Rfl: 3  •  gabapentin (NEURONTIN) 600 MG tablet, Take 600 mg by mouth 3 (three) times a day., Disp: , Rfl:   •  glucagon (GLUCAGON EMERGENCY) 1 MG injection, Inject 1 mg under the skin 1 (one) time as needed for low blood sugar., Disp: , Rfl:   •  hydrochlorothiazide (MICROZIDE)  "12.5 MG capsule, Take 12.5 mg by mouth Daily. Pt takes for Meniere's Disease., Disp: , Rfl:   •  HYDROcodone-acetaminophen (NORCO) 7.5-325 MG per tablet, Take 1 tablet by mouth Every 4 (Four) Hours As Needed for Moderate Pain (4-6)., Disp: 180 tablet, Rfl: 0  •  insulin aspart (NOVOLOG FLEXPEN) 100 UNIT/ML solution pen-injector sc pen, Inject 40 units with meals, Disp: 30 mL, Rfl: 3  •  Insulin Degludec 100 UNIT/ML solution pen-injector, Inject 70 Units under the skin Daily., Disp: 9 pen, Rfl: 3  •  Insulin Syringe 29G X 1/2\" 0.3 ML misc, Use 3 times a day, Disp: 270 each, Rfl: 3  •  KETOCARE strip, USE 1 STRIP TO CHECK FOR KETONES IF BLOOD SUGAR IS GREATER THAN 250, Disp: , Rfl: 99  •  lisinopril (PRINIVIL,ZESTRIL) 2.5 MG tablet, Take 1 tablet by mouth daily., Disp: 90 tablet, Rfl: 3  •  LORazepam (ATIVAN) 2 MG tablet, Take 1 tablet by mouth 2 (Two) Times a Day As Needed for anxiety., Disp: 60 tablet, Rfl: 2  •  MAGOX 400 400 (241.3 MG) MG tablet tablet, TAKE 1 TABLET BY MOUTH TWICE A DAY, Disp: , Rfl: 12  •  mirtazapine (REMERON) 45 MG tablet, TAKE 1 TABLET BY MOUTH EVERY DAY AT BEDTIME, Disp: 30 tablet, Rfl: 5  •  nabumetone (RELAFEN) 500 MG tablet, Take 1 tablet by mouth 2 (Two) Times a Day As Needed for mild pain (1-3)., Disp: 60 tablet, Rfl: 5  •  ondansetron (ZOFRAN) 8 MG tablet, Take 1 tablet by mouth every 8 (eight) hours., Disp: 90 tablet, Rfl: 3  •  ONE TOUCH ULTRA TEST test strip, USE TO TEST SUGAR 4 TIMES A DAY, Disp: 400 each, Rfl: 1  •  pantoprazole (PROTONIX) 40 MG EC tablet, Take 1 tablet by mouth daily., Disp: 90 tablet, Rfl: 3  •  pentoxifylline (TRENTal) 400 MG CR tablet, Take 400 mg by mouth 3 (three) times a day with meals., Disp: , Rfl:   •  venlafaxine XR (EFFEXOR-XR) 150 MG 24 hr capsule, TAKE ONE CAPSULE BY MOUTH TWICE A DAY FOR DEPRESSION, Disp: 180 capsule, Rfl: 3  •  vitamin D (ERGOCALCIFEROL) 41107 UNITS capsule capsule, TAKE ONE CAPSULE BY MOUTH EVERY SUNDAY, Disp: , Rfl: 2    Physical " Exam:  Vitals:    03/17/17 0955   BP: 120/80   Pulse:    SpO2:      Body mass index is 38.21 kg/(m^2).    GEN: alert, appears stated age and cooperative  Body Habitus: well-nourished  Neuro: CN II-XII grossly intact.   HEENT: Head: Normocephalic, no lesions, without obvious abnormality.  Neck / Thyroid: Supple, no masses, nodes, nodules or enlargement. No arcus senilis, xanthelasma or xanthomas. PERRL. Normal external ears. No drainage. No thyromegaly. Neck supple. No LAD. Trachea midline. Nose, normal.  JVP: 6 cm of water at 45 degrees HJR: absent      Carotid:  Upstroke: easily palpated bilaterally Volume: Normal.    Carotid Bruit:  None  Subclavian Bruit: Not present.    Lymph: No overt LAD.   Back: Normal.  Chest:  Normal Excursion: Good    I:E: Good  Pulmonary:clear to auscultation, no wheezes, rales or rhonchi, symmetric air entry. Equal chest excursion. Chest physical exam is normal. No tenderness.        Precordium:  No palpable heaves or thrusts. P2 is not palpable.   [place]:  normal size and placement Palpable S4: Not present.   Heart rate: normal  Heart Rhythm: regular     Heart Sounds: S1: normal intensity  S2: normal intensity  S3: absent   S4: absent  Opening Snap: absent  A2-OS:  N/A  Pericardial rub: absent    Ejection click: None      Murmurs: Systolic: none  Diastolic: none  Abdomen: Soft, non-tender, normal bowel sounds; no bruits, organomegaly or masses.  Extremity: no edema, cyanosis  Pulses: Right radial artery has 2+ (normal) pulse and Left radial artery has 2+ (normal) pulse    DATA REVIEWED:     [facility], as above    TTE, 11/2016:  Preserved LVEF    Assessment/Plan      1. ASCAD. EF: 50%  last documented 11/2016. CCS class I. The patient has been revascularized with LIMA to LAD. The patient has incomplete revascularization. Anatomy with significant obstruction: circumflex.  ASA, Plavix, Coreg, Atorvastatin  Start Ranexa  TTE    2. HTN, essential.  BP noted to be well controlled today in office.    DASH; medication compliance; Low sodium diet  TTE for LVH assessment  Continue current treatment regimen.     3. Cardiac Risk Assessment: Known CAD  Recommended ASA/Statin    4. Dyslipidemia.  Under good control.  -Statin:  Yes.  -Recommended moderate-intensity statin therapy  -Lipid-lowering medications: Lipitor (atorvastatin)  -D/C Tricor and Zetia    5. Mild MR/TR. NYHA stage B; FC-I.   TTE    6.  Lower extremity pain without evidence of PAD.  Pulses are good.  Both ABIs at rest and stress are normal.  Discontinue Trental  PCP can evaluate non--vascular causes    6. Tobacco status: non-smoker     Plan for follow-up: in 3 months      EMR Dragon/Transcription disclaimer:     Much of this encounter note is an electronic transcription. This may permit erroneous, or at times, nonsensical words or phrases to be inadvertently transcribed; Although I have reviewed the note for such errors, some may still exist.

## 2017-03-26 ENCOUNTER — HOSPITAL ENCOUNTER (EMERGENCY)
Facility: HOSPITAL | Age: 57
Discharge: HOME | End: 2017-03-26
Attending: EMERGENCY MEDICINE | Admitting: NURSE PRACTITIONER

## 2017-03-26 ENCOUNTER — APPOINTMENT (OUTPATIENT)
Dept: GENERAL RADIOLOGY | Facility: HOSPITAL | Age: 57
End: 2017-03-26

## 2017-03-26 VITALS
DIASTOLIC BLOOD PRESSURE: 64 MMHG | WEIGHT: 240 LBS | HEIGHT: 68 IN | OXYGEN SATURATION: 94 % | RESPIRATION RATE: 18 BRPM | TEMPERATURE: 100.3 F | BODY MASS INDEX: 36.37 KG/M2 | SYSTOLIC BLOOD PRESSURE: 141 MMHG | HEART RATE: 109 BPM

## 2017-03-26 DIAGNOSIS — J10.1 INFLUENZA B: Primary | ICD-10-CM

## 2017-03-26 LAB
ALBUMIN SERPL-MCNC: 3.7 G/DL (ref 3.4–4.8)
ALBUMIN/GLOB SERPL: 1.3 G/DL (ref 1.1–1.8)
ALP SERPL-CCNC: 93 U/L (ref 38–126)
ALT SERPL W P-5'-P-CCNC: 39 U/L (ref 9–52)
ANION GAP SERPL CALCULATED.3IONS-SCNC: 14 MMOL/L (ref 5–15)
AST SERPL-CCNC: 40 U/L (ref 14–36)
BASOPHILS # BLD AUTO: 0.03 10*3/MM3 (ref 0–0.2)
BASOPHILS NFR BLD AUTO: 0.3 % (ref 0–2)
BILIRUB SERPL-MCNC: 0.6 MG/DL (ref 0.2–1.3)
BUN BLD-MCNC: 11 MG/DL (ref 7–21)
BUN/CREAT SERPL: 13.1 (ref 7–25)
CALCIUM SPEC-SCNC: 8.9 MG/DL (ref 8.4–10.2)
CHLORIDE SERPL-SCNC: 98 MMOL/L (ref 95–110)
CO2 SERPL-SCNC: 25 MMOL/L (ref 22–31)
CREAT BLD-MCNC: 0.84 MG/DL (ref 0.5–1)
DEPRECATED RDW RBC AUTO: 45.3 FL (ref 36.4–46.3)
EOSINOPHIL # BLD AUTO: 0.07 10*3/MM3 (ref 0–0.7)
EOSINOPHIL NFR BLD AUTO: 0.7 % (ref 0–7)
ERYTHROCYTE [DISTWIDTH] IN BLOOD BY AUTOMATED COUNT: 14.3 % (ref 11.5–14.5)
FLUAV AG NPH QL: NEGATIVE
FLUBV AG NPH QL IA: POSITIVE
GFR SERPL CREATININE-BSD FRML MDRD: 70 ML/MIN/1.73 (ref 60–120)
GLOBULIN UR ELPH-MCNC: 2.9 GM/DL (ref 2.3–3.5)
GLUCOSE BLD-MCNC: 229 MG/DL (ref 60–100)
HCT VFR BLD AUTO: 35.3 % (ref 35–45)
HGB BLD-MCNC: 11.8 G/DL (ref 12–15.5)
IMM GRANULOCYTES # BLD: 0.05 10*3/MM3 (ref 0–0.02)
IMM GRANULOCYTES NFR BLD: 0.5 % (ref 0–0.5)
LYMPHOCYTES # BLD AUTO: 0.83 10*3/MM3 (ref 0.6–4.2)
LYMPHOCYTES NFR BLD AUTO: 7.9 % (ref 10–50)
MCH RBC QN AUTO: 29.1 PG (ref 26.5–34)
MCHC RBC AUTO-ENTMCNC: 33.4 G/DL (ref 31.4–36)
MCV RBC AUTO: 87.2 FL (ref 80–98)
MONOCYTES # BLD AUTO: 0.98 10*3/MM3 (ref 0–0.9)
MONOCYTES NFR BLD AUTO: 9.3 % (ref 0–12)
NEUTROPHILS # BLD AUTO: 8.54 10*3/MM3 (ref 2–8.6)
NEUTROPHILS NFR BLD AUTO: 81.3 % (ref 37–80)
NRBC BLD MANUAL-RTO: 0 /100 WBC (ref 0–0)
NT-PROBNP SERPL-MCNC: 951 PG/ML (ref 0–900)
PLATELET # BLD AUTO: 276 10*3/MM3 (ref 150–450)
PMV BLD AUTO: 9.8 FL (ref 8–12)
POTASSIUM BLD-SCNC: 4.4 MMOL/L (ref 3.5–5.1)
PROT SERPL-MCNC: 6.6 G/DL (ref 6.3–8.6)
RBC # BLD AUTO: 4.05 10*6/MM3 (ref 3.77–5.16)
SODIUM BLD-SCNC: 137 MMOL/L (ref 137–145)
TROPONIN I SERPL-MCNC: <0.012 NG/ML
WBC NRBC COR # BLD: 10.5 10*3/MM3 (ref 3.2–9.8)

## 2017-03-26 PROCEDURE — 80053 COMPREHEN METABOLIC PANEL: CPT | Performed by: EMERGENCY MEDICINE

## 2017-03-26 PROCEDURE — 85025 COMPLETE CBC W/AUTO DIFF WBC: CPT | Performed by: EMERGENCY MEDICINE

## 2017-03-26 PROCEDURE — 84484 ASSAY OF TROPONIN QUANT: CPT | Performed by: EMERGENCY MEDICINE

## 2017-03-26 PROCEDURE — 83880 ASSAY OF NATRIURETIC PEPTIDE: CPT | Performed by: EMERGENCY MEDICINE

## 2017-03-26 PROCEDURE — 71020 HC CHEST PA AND LATERAL: CPT

## 2017-03-26 PROCEDURE — 93005 ELECTROCARDIOGRAM TRACING: CPT

## 2017-03-26 PROCEDURE — 93010 ELECTROCARDIOGRAM REPORT: CPT | Performed by: INTERNAL MEDICINE

## 2017-03-26 PROCEDURE — 99283 EMERGENCY DEPT VISIT LOW MDM: CPT

## 2017-03-26 PROCEDURE — 87804 INFLUENZA ASSAY W/OPTIC: CPT | Performed by: EMERGENCY MEDICINE

## 2017-03-26 RX ORDER — OSELTAMIVIR PHOSPHATE 75 MG/1
75 CAPSULE ORAL ONCE
Status: COMPLETED | OUTPATIENT
Start: 2017-03-26 | End: 2017-03-26

## 2017-03-26 RX ORDER — OSELTAMIVIR PHOSPHATE 75 MG/1
75 CAPSULE ORAL 2 TIMES DAILY
Qty: 9 CAPSULE | Refills: 0 | Status: SHIPPED | OUTPATIENT
Start: 2017-03-26 | End: 2017-04-06 | Stop reason: HOSPADM

## 2017-03-26 RX ORDER — DEXTROMETHORPHAN HYDROBROMIDE AND PROMETHAZINE HYDROCHLORIDE 15; 6.25 MG/5ML; MG/5ML
5 SYRUP ORAL 4 TIMES DAILY PRN
Qty: 180 ML | Refills: 0 | Status: SHIPPED | OUTPATIENT
Start: 2017-03-26 | End: 2017-04-06 | Stop reason: HOSPADM

## 2017-03-26 RX ORDER — SODIUM CHLORIDE 0.9 % (FLUSH) 0.9 %
10 SYRINGE (ML) INJECTION AS NEEDED
Status: DISCONTINUED | OUTPATIENT
Start: 2017-03-26 | End: 2017-03-26 | Stop reason: HOSPADM

## 2017-03-26 RX ORDER — ONDANSETRON 4 MG/1
4 TABLET, ORALLY DISINTEGRATING ORAL ONCE
Status: COMPLETED | OUTPATIENT
Start: 2017-03-26 | End: 2017-03-26

## 2017-03-26 RX ADMIN — OSELTAMIVIR PHOSPHATE 75 MG: 75 CAPSULE ORAL at 20:40

## 2017-03-26 RX ADMIN — ONDANSETRON 4 MG: 4 TABLET, ORALLY DISINTEGRATING ORAL at 20:03

## 2017-03-26 NOTE — ED PROVIDER NOTES
Subjective   HPI Comments: Onset yesterday coughing till vomiting, now having pain in chest and back. Has had fever and chills. C/o nausea. Hx CABG in 2005. States she feels like she has the flu, aching. She did take the flu vaccine this year.    Patient is a 57 y.o. female presenting with cough.   History provided by:  Patient  Cough   Cough characteristics:  Productive  Sputum characteristics:  Nondescript  Severity:  Moderate  Onset quality:  Sudden  Duration:  2 days  Timing:  Intermittent  Progression:  Worsening  Chronicity:  New  Smoker: no    Relieved by:  Nothing  Associated symptoms: chest pain        Review of Systems   Constitutional: Negative.    HENT: Positive for congestion.    Eyes: Negative.    Respiratory: Positive for cough.    Cardiovascular: Positive for chest pain.   Gastrointestinal: Positive for nausea.   Genitourinary: Negative.    Musculoskeletal: Negative.    Skin: Negative.    Neurological: Negative.    Psychiatric/Behavioral: Negative.        Past Medical History:   Diagnosis Date   • Acquired hypothyroidism    • Angina, class IV    • Anxiety    • Benign paroxysmal positional vertigo    • Carpal tunnel syndrome    • Chronic pain    • Coronary atherosclerosis    • Depression    • Diabetes mellitus     Type 2, controlled   • Diabetic polyneuropathy    • Female stress incontinence    • Hashimoto's thyroiditis    • Hyperlipidemia    • Hypertension    • Low back pain    • Malaise and fatigue    • Multiple joint pain    • Obesity     Refuses to be weighed   • Otalgia     Both   • Perforation of tympanic membrane     Left   • Postoperative wound infection    • Vitamin D deficiency        Allergies   Allergen Reactions   • Seroquel [Quetiapine Fumarate] Anaphylaxis   • Avandia [Rosiglitazone] Swelling   • Morphine And Related Hallucinations     If patient takes to much       Past Surgical History:   Procedure Laterality Date   • ABDOMINAL SURGERY     • ANGIOPLASTY      coronary   • CARDIAC  "CATHETERIZATION     • CARPAL TUNNEL RELEASE     • CHOLECYSTECTOMY     • CORONARY ARTERY BYPASS GRAFT     • ENDOSCOPY AND COLONOSCOPY     • FOOT SURGERY      Toes   • GASTRIC BANDING      Revision, laparoscopic   • HYSTERECTOMY     • MOUTH SURGERY     • SALPINGO OOPHORECTOMY     • SHOULDER SURGERY     • TRANSESOPHAGEAL ECHOCARDIOGRAM (LAMONTE)      With color flow       Family History   Problem Relation Age of Onset   • Diabetes Other    • Heart disease Other    • Hypertension Other        Social History     Social History   • Marital status:      Spouse name: N/A   • Number of children: N/A   • Years of education: N/A     Social History Main Topics   • Smoking status: Never Smoker   • Smokeless tobacco: Never Used   • Alcohol use No   • Drug use: No   • Sexual activity: Defer     Other Topics Concern   • None     Social History Narrative           Objective   Physical Exam   Constitutional: She is oriented to person, place, and time. She appears well-developed and well-nourished.   HENT:   Head: Normocephalic.   Eyes: EOM are normal. Pupils are equal, round, and reactive to light.   Neck: Normal range of motion. Neck supple.   Cardiovascular: Normal rate and regular rhythm.    Chest non tender to palpation   Pulmonary/Chest: Effort normal and breath sounds normal.   Cough is harsh, loose   Abdominal: Soft. Bowel sounds are normal.   Neurological: She is alert and oriented to person, place, and time.   Skin: Skin is warm and dry.   Nursing note and vitals reviewed.  /64 (BP Location: Right arm, Patient Position: Lying)  Pulse 109  Temp 100.3 °F (37.9 °C)  Resp 18  Ht 68\" (172.7 cm)  Wt 240 lb (109 kg)  SpO2 94%  BMI 36.49 kg/m2      ECG 12 Lead    Date/Time: 3/26/2017 5:44 PM  Performed by: FREDDY ISABEL  Authorized by: ROBIN GUO   Interpreted by physician  Comparison: not compared with previous ECG   Rhythm: sinus tachycardia  BPM: 109                 ED Course  ED Course      XR Chest " 2 View   Final Result   CONCLUSION:   No Acute Disease   Sternotomy      [id number]      Electronically signed by:  David Gamboa MD  3/26/2017 6:55 PM CDT   Workstation: [email or web address]        Results for orders placed or performed during the hospital encounter of 03/26/17   Influenza Antigen   Result Value Ref Range    Influenza A Ag, EIA Negative Negative    Influenza B Ag, EIA Positive (A) Negative   Troponin   Result Value Ref Range    Troponin I <0.012 <=0.034 ng/mL   Comprehensive Metabolic Panel   Result Value Ref Range    Glucose 229 (H) 60 - 100 mg/dL    BUN 11 7 - 21 mg/dL    Creatinine 0.84 0.50 - 1.00 mg/dL    Sodium 137 137 - 145 mmol/L    Potassium 4.4 3.5 - 5.1 mmol/L    Chloride 98 95 - 110 mmol/L    CO2 25.0 22.0 - 31.0 mmol/L    Calcium 8.9 8.4 - 10.2 mg/dL    Total Protein 6.6 6.3 - 8.6 g/dL    Albumin 3.70 3.40 - 4.80 g/dL    ALT (SGPT) 39 9 - 52 U/L    AST (SGOT) 40 (H) 14 - 36 U/L    Alkaline Phosphatase 93 38 - 126 U/L    Total Bilirubin 0.6 0.2 - 1.3 mg/dL    eGFR Non African Amer 70 >60 mL/min/1.73    Globulin 2.9 2.3 - 3.5 gm/dL    A/G Ratio 1.3 1.1 - 1.8 g/dL    BUN/Creatinine Ratio 13.1 7.0 - 25.0    Anion Gap 14.0 5.0 - 15.0 mmol/L   BNP   Result Value Ref Range    proBNP 951.0 (H) 0.0 - 900.0 pg/mL   CBC Auto Differential   Result Value Ref Range    WBC 10.50 (H) 3.20 - 9.80 10*3/mm3    RBC 4.05 3.77 - 5.16 10*6/mm3    Hemoglobin 11.8 (L) 12.0 - 15.5 g/dL    Hematocrit 35.3 35.0 - 45.0 %    MCV 87.2 80.0 - 98.0 fL    MCH 29.1 26.5 - 34.0 pg    MCHC 33.4 31.4 - 36.0 g/dL    RDW 14.3 11.5 - 14.5 %    RDW-SD 45.3 36.4 - 46.3 fl    MPV 9.8 8.0 - 12.0 fL    Platelets 276 150 - 450 10*3/mm3    Neutrophil % 81.3 (H) 37.0 - 80.0 %    Lymphocyte % 7.9 (L) 10.0 - 50.0 %    Monocyte % 9.3 0.0 - 12.0 %    Eosinophil % 0.7 0.0 - 7.0 %    Basophil % 0.3 0.0 - 2.0 %    Immature Grans % 0.5 0.0 - 0.5 %    Neutrophils, Absolute 8.54 2.00 - 8.60 10*3/mm3    Lymphocytes, Absolute 0.83 0.60 - 4.20 10*3/mm3     Monocytes, Absolute 0.98 (H) 0.00 - 0.90 10*3/mm3    Eosinophils, Absolute 0.07 0.00 - 0.70 10*3/mm3    Basophils, Absolute 0.03 0.00 - 0.20 10*3/mm3    Immature Grans, Absolute 0.05 (H) 0.00 - 0.02 10*3/mm3    nRBC 0.0 0.0 - 0.0 /100 WBC   Light Blue Top   Result Value Ref Range    Extra Tube hold for add-on    Green Top (Gel)   Result Value Ref Range    Extra Tube Hold for add-ons.    Lavender Top   Result Value Ref Range    Extra Tube hold for add-on    Gold Top - SST   Result Value Ref Range    Extra Tube Hold for add-ons.                    MDM  Number of Diagnoses or Management Options  Influenza B:   Diagnosis management comments: Pt  C/o cough, fever, chills. CXR with no active disease, WBC 10.5. Positive for flu B. Will treat with Tamiflu, (first dose given in ER) fluids, and rest. Follow up with PCP. May return to ED any worsening of symptoms.          Final diagnoses:   Influenza B            Jessi Dick, APRN  03/27/17 0732

## 2017-03-27 LAB
HOLD SPECIMEN: NORMAL
HOLD SPECIMEN: NORMAL
WHOLE BLOOD HOLD SPECIMEN: NORMAL
WHOLE BLOOD HOLD SPECIMEN: NORMAL

## 2017-04-03 ENCOUNTER — HOSPITAL ENCOUNTER (INPATIENT)
Facility: HOSPITAL | Age: 57
LOS: 3 days | Discharge: HOME HEALTH | End: 2017-04-06
Attending: EMERGENCY MEDICINE | Admitting: FAMILY MEDICINE

## 2017-04-03 ENCOUNTER — APPOINTMENT (OUTPATIENT)
Dept: GENERAL RADIOLOGY | Facility: HOSPITAL | Age: 57
End: 2017-04-03

## 2017-04-03 DIAGNOSIS — R07.89 ATYPICAL CHEST PAIN: Primary | ICD-10-CM

## 2017-04-03 DIAGNOSIS — I95.1 ORTHOSTATIC HYPOTENSION: ICD-10-CM

## 2017-04-03 DIAGNOSIS — R26.89 IMPAIRED GAIT AND MOBILITY: ICD-10-CM

## 2017-04-03 DIAGNOSIS — R19.7 DIARRHEA, UNSPECIFIED TYPE: ICD-10-CM

## 2017-04-03 DIAGNOSIS — Z74.09 IMPAIRED PHYSICAL MOBILITY: ICD-10-CM

## 2017-04-03 LAB
ALBUMIN SERPL-MCNC: 4.4 G/DL (ref 3.4–4.8)
ALBUMIN/GLOB SERPL: 1.3 G/DL (ref 1.1–1.8)
ALP SERPL-CCNC: 84 U/L (ref 38–126)
ALT SERPL W P-5'-P-CCNC: 35 U/L (ref 9–52)
ANION GAP SERPL CALCULATED.3IONS-SCNC: 16 MMOL/L (ref 5–15)
APTT PPP: 23.3 SECONDS (ref 20–40.3)
AST SERPL-CCNC: 95 U/L (ref 14–36)
BACTERIA UR QL AUTO: ABNORMAL /HPF
BASOPHILS # BLD AUTO: 0.03 10*3/MM3 (ref 0–0.2)
BASOPHILS NFR BLD AUTO: 0.3 % (ref 0–2)
BILIRUB SERPL-MCNC: 0.6 MG/DL (ref 0.2–1.3)
BILIRUB UR QL STRIP: ABNORMAL
BUN BLD-MCNC: 14 MG/DL (ref 7–21)
BUN/CREAT SERPL: 12.3 (ref 7–25)
CALCIUM SPEC-SCNC: 9.8 MG/DL (ref 8.4–10.2)
CHLORIDE SERPL-SCNC: 97 MMOL/L (ref 95–110)
CK MB SERPL-CCNC: 1.05 NG/ML (ref 0–5)
CK SERPL-CCNC: 61 U/L (ref 30–135)
CLARITY UR: ABNORMAL
CO2 SERPL-SCNC: 29 MMOL/L (ref 22–31)
COLOR UR: ABNORMAL
CREAT BLD-MCNC: 1.14 MG/DL (ref 0.5–1)
DEPRECATED RDW RBC AUTO: 40.7 FL (ref 36.4–46.3)
EOSINOPHIL # BLD AUTO: 0.19 10*3/MM3 (ref 0–0.7)
EOSINOPHIL NFR BLD AUTO: 1.8 % (ref 0–7)
ERYTHROCYTE [DISTWIDTH] IN BLOOD BY AUTOMATED COUNT: 13.5 % (ref 11.5–14.5)
GFR SERPL CREATININE-BSD FRML MDRD: 49 ML/MIN/1.73 (ref 51–120)
GLOBULIN UR ELPH-MCNC: 3.4 GM/DL (ref 2.3–3.5)
GLUCOSE BLD-MCNC: 83 MG/DL (ref 60–100)
GLUCOSE UR STRIP-MCNC: ABNORMAL MG/DL
HCT VFR BLD AUTO: 41.1 % (ref 35–45)
HGB BLD-MCNC: 14.1 G/DL (ref 12–15.5)
HGB UR QL STRIP.AUTO: NEGATIVE
HOLD SPECIMEN: NORMAL
HYALINE CASTS UR QL AUTO: ABNORMAL /LPF
IMM GRANULOCYTES # BLD: 0.2 10*3/MM3 (ref 0–0.02)
IMM GRANULOCYTES NFR BLD: 1.9 % (ref 0–0.5)
INR PPP: 0.95 (ref 0.8–1.2)
KETONES UR QL STRIP: ABNORMAL
LEUKOCYTE ESTERASE UR QL STRIP.AUTO: ABNORMAL
LYMPHOCYTES # BLD AUTO: 2.95 10*3/MM3 (ref 0.6–4.2)
LYMPHOCYTES NFR BLD AUTO: 27.8 % (ref 10–50)
MCH RBC QN AUTO: 28.7 PG (ref 26.5–34)
MCHC RBC AUTO-ENTMCNC: 34.3 G/DL (ref 31.4–36)
MCV RBC AUTO: 83.5 FL (ref 80–98)
MONOCYTES # BLD AUTO: 0.82 10*3/MM3 (ref 0–0.9)
MONOCYTES NFR BLD AUTO: 7.7 % (ref 0–12)
NEUTROPHILS # BLD AUTO: 6.43 10*3/MM3 (ref 2–8.6)
NEUTROPHILS NFR BLD AUTO: 60.5 % (ref 37–80)
NITRITE UR QL STRIP: NEGATIVE
NRBC BLD MANUAL-RTO: 0 /100 WBC (ref 0–0)
PH UR STRIP.AUTO: <=5 [PH] (ref 5–9)
PLATELET # BLD AUTO: 671 10*3/MM3 (ref 150–450)
PMV BLD AUTO: 9 FL (ref 8–12)
POTASSIUM BLD-SCNC: 3.3 MMOL/L (ref 3.5–5.1)
PROT SERPL-MCNC: 7.8 G/DL (ref 6.3–8.6)
PROT UR QL STRIP: ABNORMAL
PROTHROMBIN TIME: 12.6 SECONDS (ref 11.1–15.3)
RBC # BLD AUTO: 4.92 10*6/MM3 (ref 3.77–5.16)
RBC # UR: ABNORMAL /HPF
RBC MORPH BLD: NORMAL
REF LAB TEST METHOD: ABNORMAL
SMALL PLATELETS BLD QL SMEAR: NORMAL
SODIUM BLD-SCNC: 142 MMOL/L (ref 137–145)
SP GR UR STRIP: 1.02 (ref 1–1.03)
SQUAMOUS #/AREA URNS HPF: ABNORMAL /HPF
TROPONIN I SERPL-MCNC: <0.012 NG/ML
TROPONIN I SERPL-MCNC: <0.012 NG/ML
UROBILINOGEN UR QL STRIP: ABNORMAL
WBC MORPH BLD: NORMAL
WBC NRBC COR # BLD: 10.62 10*3/MM3 (ref 3.2–9.8)
WBC UR QL AUTO: ABNORMAL /HPF
WHOLE BLOOD HOLD SPECIMEN: NORMAL
WHOLE BLOOD HOLD SPECIMEN: NORMAL
YEAST URNS QL MICRO: ABNORMAL /HPF

## 2017-04-03 PROCEDURE — 85730 THROMBOPLASTIN TIME PARTIAL: CPT | Performed by: EMERGENCY MEDICINE

## 2017-04-03 PROCEDURE — 81001 URINALYSIS AUTO W/SCOPE: CPT | Performed by: EMERGENCY MEDICINE

## 2017-04-03 PROCEDURE — 85025 COMPLETE CBC W/AUTO DIFF WBC: CPT | Performed by: EMERGENCY MEDICINE

## 2017-04-03 PROCEDURE — 82553 CREATINE MB FRACTION: CPT | Performed by: EMERGENCY MEDICINE

## 2017-04-03 PROCEDURE — 93010 ELECTROCARDIOGRAM REPORT: CPT | Performed by: INTERNAL MEDICINE

## 2017-04-03 PROCEDURE — 80053 COMPREHEN METABOLIC PANEL: CPT | Performed by: EMERGENCY MEDICINE

## 2017-04-03 PROCEDURE — 85610 PROTHROMBIN TIME: CPT | Performed by: EMERGENCY MEDICINE

## 2017-04-03 PROCEDURE — G0378 HOSPITAL OBSERVATION PER HR: HCPCS

## 2017-04-03 PROCEDURE — 93005 ELECTROCARDIOGRAM TRACING: CPT | Performed by: EMERGENCY MEDICINE

## 2017-04-03 PROCEDURE — 84484 ASSAY OF TROPONIN QUANT: CPT | Performed by: EMERGENCY MEDICINE

## 2017-04-03 PROCEDURE — 80048 BASIC METABOLIC PNL TOTAL CA: CPT | Performed by: FAMILY MEDICINE

## 2017-04-03 PROCEDURE — 85007 BL SMEAR W/DIFF WBC COUNT: CPT | Performed by: EMERGENCY MEDICINE

## 2017-04-03 PROCEDURE — 82550 ASSAY OF CK (CPK): CPT | Performed by: EMERGENCY MEDICINE

## 2017-04-03 PROCEDURE — 99285 EMERGENCY DEPT VISIT HI MDM: CPT

## 2017-04-03 PROCEDURE — 84484 ASSAY OF TROPONIN QUANT: CPT | Performed by: FAMILY MEDICINE

## 2017-04-03 PROCEDURE — 87086 URINE CULTURE/COLONY COUNT: CPT | Performed by: EMERGENCY MEDICINE

## 2017-04-03 PROCEDURE — 71020 HC CHEST PA AND LATERAL: CPT

## 2017-04-03 RX ORDER — CLOPIDOGREL BISULFATE 75 MG/1
75 TABLET ORAL DAILY
Status: DISCONTINUED | OUTPATIENT
Start: 2017-04-04 | End: 2017-04-06 | Stop reason: HOSPADM

## 2017-04-03 RX ORDER — VENLAFAXINE HYDROCHLORIDE 75 MG/1
150 CAPSULE, EXTENDED RELEASE ORAL
Status: DISCONTINUED | OUTPATIENT
Start: 2017-04-04 | End: 2017-04-06 | Stop reason: HOSPADM

## 2017-04-03 RX ORDER — SODIUM CHLORIDE 0.9 % (FLUSH) 0.9 %
1-10 SYRINGE (ML) INJECTION AS NEEDED
Status: DISCONTINUED | OUTPATIENT
Start: 2017-04-03 | End: 2017-04-06 | Stop reason: HOSPADM

## 2017-04-03 RX ORDER — HYDROCODONE BITARTRATE AND ACETAMINOPHEN 7.5; 325 MG/1; MG/1
1 TABLET ORAL EVERY 4 HOURS PRN
Status: DISCONTINUED | OUTPATIENT
Start: 2017-04-03 | End: 2017-04-06 | Stop reason: HOSPADM

## 2017-04-03 RX ORDER — PANTOPRAZOLE SODIUM 40 MG/1
40 TABLET, DELAYED RELEASE ORAL DAILY
Status: DISCONTINUED | OUTPATIENT
Start: 2017-04-04 | End: 2017-04-06 | Stop reason: HOSPADM

## 2017-04-03 RX ORDER — ONDANSETRON 4 MG/1
8 TABLET, FILM COATED ORAL EVERY 8 HOURS
Status: DISCONTINUED | OUTPATIENT
Start: 2017-04-04 | End: 2017-04-06 | Stop reason: HOSPADM

## 2017-04-03 RX ORDER — SODIUM CHLORIDE 9 MG/ML
125 INJECTION, SOLUTION INTRAVENOUS CONTINUOUS
Status: DISCONTINUED | OUTPATIENT
Start: 2017-04-03 | End: 2017-04-06 | Stop reason: HOSPADM

## 2017-04-03 RX ORDER — NICOTINE POLACRILEX 4 MG
15 LOZENGE BUCCAL
Status: DISCONTINUED | OUTPATIENT
Start: 2017-04-03 | End: 2017-04-06 | Stop reason: HOSPADM

## 2017-04-03 RX ORDER — LORAZEPAM 2 MG/1
2 TABLET ORAL 2 TIMES DAILY PRN
Status: DISCONTINUED | OUTPATIENT
Start: 2017-04-03 | End: 2017-04-06 | Stop reason: HOSPADM

## 2017-04-03 RX ORDER — OSELTAMIVIR PHOSPHATE 75 MG/1
75 CAPSULE ORAL 2 TIMES DAILY
Status: DISCONTINUED | OUTPATIENT
Start: 2017-04-04 | End: 2017-04-04

## 2017-04-03 RX ORDER — MIRTAZAPINE 15 MG/1
45 TABLET, FILM COATED ORAL NIGHTLY
Status: DISCONTINUED | OUTPATIENT
Start: 2017-04-04 | End: 2017-04-06 | Stop reason: HOSPADM

## 2017-04-03 RX ORDER — NITROGLYCERIN 0.4 MG/1
0.4 TABLET SUBLINGUAL
Status: DISCONTINUED | OUTPATIENT
Start: 2017-04-03 | End: 2017-04-06 | Stop reason: HOSPADM

## 2017-04-03 RX ORDER — ONDANSETRON 2 MG/ML
4 INJECTION INTRAMUSCULAR; INTRAVENOUS EVERY 6 HOURS PRN
Status: DISCONTINUED | OUTPATIENT
Start: 2017-04-03 | End: 2017-04-06 | Stop reason: HOSPADM

## 2017-04-03 RX ORDER — RANOLAZINE 500 MG/1
1000 TABLET, EXTENDED RELEASE ORAL 2 TIMES DAILY
Status: DISCONTINUED | OUTPATIENT
Start: 2017-04-04 | End: 2017-04-06 | Stop reason: HOSPADM

## 2017-04-03 RX ORDER — ATORVASTATIN CALCIUM 40 MG/1
40 TABLET, FILM COATED ORAL EVERY EVENING
Status: DISCONTINUED | OUTPATIENT
Start: 2017-04-04 | End: 2017-04-06 | Stop reason: HOSPADM

## 2017-04-03 RX ORDER — GABAPENTIN 300 MG/1
600 CAPSULE ORAL EVERY 8 HOURS SCHEDULED
Status: DISCONTINUED | OUTPATIENT
Start: 2017-04-04 | End: 2017-04-06 | Stop reason: HOSPADM

## 2017-04-03 RX ORDER — ARIPIPRAZOLE 15 MG/1
15 TABLET ORAL DAILY
Status: DISCONTINUED | OUTPATIENT
Start: 2017-04-04 | End: 2017-04-06 | Stop reason: HOSPADM

## 2017-04-03 RX ORDER — DEXTROSE MONOHYDRATE 25 G/50ML
25 INJECTION, SOLUTION INTRAVENOUS
Status: DISCONTINUED | OUTPATIENT
Start: 2017-04-03 | End: 2017-04-06 | Stop reason: HOSPADM

## 2017-04-03 RX ORDER — LISINOPRIL 2.5 MG/1
2.5 TABLET ORAL DAILY
Status: DISCONTINUED | OUTPATIENT
Start: 2017-04-04 | End: 2017-04-06 | Stop reason: HOSPADM

## 2017-04-03 RX ORDER — ASPIRIN 81 MG/1
81 TABLET, CHEWABLE ORAL DAILY
Status: DISCONTINUED | OUTPATIENT
Start: 2017-04-04 | End: 2017-04-06 | Stop reason: HOSPADM

## 2017-04-03 RX ORDER — ERGOCALCIFEROL 1.25 MG/1
50000 CAPSULE ORAL
Status: DISCONTINUED | OUTPATIENT
Start: 2017-04-09 | End: 2017-04-06 | Stop reason: HOSPADM

## 2017-04-03 RX ORDER — HYDROCHLOROTHIAZIDE 12.5 MG/1
12.5 CAPSULE, GELATIN COATED ORAL DAILY
Status: DISCONTINUED | OUTPATIENT
Start: 2017-04-04 | End: 2017-04-04

## 2017-04-03 RX ORDER — ASPIRIN 81 MG/1
324 TABLET, CHEWABLE ORAL ONCE
Status: COMPLETED | OUTPATIENT
Start: 2017-04-03 | End: 2017-04-03

## 2017-04-03 RX ORDER — SODIUM CHLORIDE 0.9 % (FLUSH) 0.9 %
10 SYRINGE (ML) INJECTION AS NEEDED
Status: DISCONTINUED | OUTPATIENT
Start: 2017-04-03 | End: 2017-04-06 | Stop reason: HOSPADM

## 2017-04-03 RX ORDER — DOCUSATE SODIUM 100 MG/1
100 CAPSULE, LIQUID FILLED ORAL 2 TIMES DAILY
Status: DISCONTINUED | OUTPATIENT
Start: 2017-04-04 | End: 2017-04-06 | Stop reason: HOSPADM

## 2017-04-03 RX ORDER — SODIUM CHLORIDE 9 MG/ML
100 INJECTION, SOLUTION INTRAVENOUS CONTINUOUS
Status: DISCONTINUED | OUTPATIENT
Start: 2017-04-04 | End: 2017-04-04

## 2017-04-03 RX ORDER — NAPROXEN 250 MG/1
250 TABLET ORAL 2 TIMES DAILY WITH MEALS
Status: DISCONTINUED | OUTPATIENT
Start: 2017-04-04 | End: 2017-04-06 | Stop reason: HOSPADM

## 2017-04-03 RX ORDER — CARVEDILOL 3.12 MG/1
3.12 TABLET ORAL 2 TIMES DAILY WITH MEALS
Status: DISCONTINUED | OUTPATIENT
Start: 2017-04-04 | End: 2017-04-06 | Stop reason: HOSPADM

## 2017-04-03 RX ORDER — FUROSEMIDE 40 MG/1
40 TABLET ORAL DAILY
Status: DISCONTINUED | OUTPATIENT
Start: 2017-04-04 | End: 2017-04-04

## 2017-04-03 RX ORDER — ACETAMINOPHEN 325 MG/1
650 TABLET ORAL EVERY 4 HOURS PRN
Status: DISCONTINUED | OUTPATIENT
Start: 2017-04-03 | End: 2017-04-06 | Stop reason: HOSPADM

## 2017-04-03 RX ADMIN — SODIUM CHLORIDE 125 ML/HR: 9 INJECTION, SOLUTION INTRAVENOUS at 18:45

## 2017-04-03 RX ADMIN — Medication 10 ML: at 18:45

## 2017-04-03 RX ADMIN — SODIUM CHLORIDE 1000 ML: 900 INJECTION, SOLUTION INTRAVENOUS at 17:48

## 2017-04-03 RX ADMIN — ASPIRIN 324 MG: 81 TABLET, CHEWABLE ORAL at 18:45

## 2017-04-03 RX ADMIN — SODIUM CHLORIDE 100 ML/HR: 9 INJECTION, SOLUTION INTRAVENOUS at 23:54

## 2017-04-03 NOTE — ED PROVIDER NOTES
Subjective   HPI Comments: 56yo female pmh significant htn/dm2/dm2/obesity/hyperlipidemia presents ED c/o 1wk hx nonproductive cough/sore throat/diarrhea.  Pt reports postural dizziness et subsequent fall x2 earlier today.  Pt completed tamiflu rx yesterday for recent dx influenza.    Patient is a 57 y.o. female presenting with general illness.   Illness   Severity:  Moderate  Onset quality:  Gradual  Duration:  1 week  Chronicity:  New  Associated symptoms: congestion, cough, diarrhea, fatigue and sore throat    Associated symptoms: no abdominal pain, no chest pain, no nausea and no vomiting        Review of Systems   Constitutional: Positive for fatigue.   HENT: Positive for congestion and sore throat.    Respiratory: Positive for cough.    Cardiovascular: Negative for chest pain.   Gastrointestinal: Positive for diarrhea. Negative for abdominal pain, blood in stool, nausea and vomiting.   Genitourinary: Negative for dysuria.   Neurological: Positive for dizziness.       Past Medical History:   Diagnosis Date   • Acquired hypothyroidism    • Angina, class IV    • Anxiety    • Benign paroxysmal positional vertigo    • Carpal tunnel syndrome    • Chronic pain    • Coronary atherosclerosis    • Depression    • Diabetes mellitus     Type 2, controlled   • Diabetic polyneuropathy    • Female stress incontinence    • Hashimoto's thyroiditis    • Hyperlipidemia    • Hypertension    • Low back pain    • Malaise and fatigue    • Multiple joint pain    • Obesity     Refuses to be weighed   • Otalgia     Both   • Perforation of tympanic membrane     Left   • Postoperative wound infection    • Vitamin D deficiency        Allergies   Allergen Reactions   • Seroquel [Quetiapine Fumarate] Anaphylaxis   • Avandia [Rosiglitazone] Swelling   • Morphine And Related Hallucinations     If patient takes to much       Past Surgical History:   Procedure Laterality Date   • ABDOMINAL SURGERY     • ANGIOPLASTY      coronary   • CARDIAC  CATHETERIZATION     • CARPAL TUNNEL RELEASE     • CHOLECYSTECTOMY     • CORONARY ARTERY BYPASS GRAFT     • ENDOSCOPY AND COLONOSCOPY     • FOOT SURGERY      Toes   • GASTRIC BANDING      Revision, laparoscopic   • HYSTERECTOMY     • MOUTH SURGERY     • SALPINGO OOPHORECTOMY     • SHOULDER SURGERY     • TRANSESOPHAGEAL ECHOCARDIOGRAM (LAMONTE)      With color flow       Family History   Problem Relation Age of Onset   • Diabetes Other    • Heart disease Other    • Hypertension Other        Social History     Social History   • Marital status:      Spouse name: N/A   • Number of children: N/A   • Years of education: N/A     Social History Main Topics   • Smoking status: Never Smoker   • Smokeless tobacco: Never Used   • Alcohol use No   • Drug use: No   • Sexual activity: Defer     Other Topics Concern   • None     Social History Narrative           Objective   Physical Exam   Constitutional: She is oriented to person, place, and time. She appears well-developed and well-nourished.   HENT:   Head: Normocephalic and atraumatic.   Right Ear: External ear normal.   Left Ear: External ear normal.   Nose: Nose normal.   Mouth/Throat: Oropharynx is clear and moist.   Eyes: Pupils are equal, round, and reactive to light.   Neck: Neck supple. No JVD present. No tracheal deviation present.   Cardiovascular: Normal rate, regular rhythm, normal heart sounds and intact distal pulses.  Exam reveals no gallop and no friction rub.    No murmur heard.  Pulmonary/Chest: Effort normal and breath sounds normal. She has no wheezes. She has no rales.   Abdominal: Soft. Bowel sounds are normal. There is no tenderness. There is no rebound and no guarding.   Lymphadenopathy:     She has no cervical adenopathy.   Neurological: She is alert and oriented to person, place, and time.   Skin: Skin is warm and dry.   Nursing note and vitals reviewed.      ECG 12 Lead    Date/Time: 4/3/2017 6:36 PM  Performed by: RUDDY ACOSTA  Authorized by:  RUDDY ACOSTA   Interpreted by physician  Rhythm: sinus rhythm  Rate: normal  BPM: 80  QRS axis: normal  Conduction: conduction normal  ST Segments: ST segments normal  T Waves: T waves normal  Other findings: prolonged QTc interval  Clinical impression: non-specific ECG               ED Course  ED Course      Labs Reviewed   COMPREHENSIVE METABOLIC PANEL - Abnormal; Notable for the following:        Result Value    Creatinine 1.14 (*)     Potassium 3.3 (*)     AST (SGOT) 95 (*)     eGFR Non African Amer 49 (*)     Anion Gap 16.0 (*)     All other components within normal limits   CBC WITH AUTO DIFFERENTIAL - Abnormal; Notable for the following:     WBC 10.62 (*)     Platelets 671 (*)     Immature Grans % 1.9 (*)     Immature Grans, Absolute 0.20 (*)     All other components within normal limits   PROTIME-INR - Normal    Narrative:     Therapeutic range for most indications is 2.0-3.0 INR,  or 2.5-3.5 for mechanical heart valves.   APTT - Normal    Narrative:     The recommended Heparin therapeutic range is 68-97 seconds.   CK - Normal   CK MB - Normal   SCAN SLIDE   URINALYSIS W/ CULTURE IF INDICATED   RAINBOW DRAW    Narrative:     The following orders were created for panel order Story Draw.  Procedure                               Abnormality         Status                     ---------                               -----------         ------                     Light Blue Top[[id number]]                                    In process                 Green Top (Gel)[[id number]]                                   In process                 Lavender Top[[id number]]                                      In process                 Gold Top - SST[[id number]]                                    In process                   Please view results for these tests on the individual orders.   TROPONIN (IN-HOUSE)   TROPONIN (IN-HOUSE)   CBC AND DIFFERENTIAL    Narrative:     The following orders were created for panel order CBC &  Differential.  Procedure                               Abnormality         Status                     ---------                               -----------         ------                     Scan Slide[[id number]]                                        Final result               CBC Auto Differential[[id number]]         Abnormal            Final result                 Please view results for these tests on the individual orders.   LIGHT BLUE TOP   GREEN TOP   LAVENDER TOP   GOLD TOP - SST   EXTRA TUBES    Narrative:     The following orders were created for panel order Extra Tubes.  Procedure                               Abnormality         Status                     ---------                               -----------         ------                     Green Top (Gel)[[id number]]                                   In process                   Please view results for these tests on the individual orders.   GREEN TOP     Xr Chest 2 View    Result Date: 4/3/2017  Narrative: Patient Name:  MRS. CHIQUITA BAILEY Patient ID:  [id number] Ordering:  RUDDY ACOSTA Attending:  RUDDY ACOSTA Referring:  RUDDY ACOSTA ------------------------------------------------ Chest PA and lateral CLINICAL INDICATION: Shortness of breath. Cough COMPARISON: March 26, 2017. FINDINGS: Cardiac silhouette is normal in size. Pulmonary vascularity is unremarkable. Midline sternal sutures from prior cardiac surgery. Chronic elevation right diaphragm. Subtle fibrotic changes right midlung field. No focal infiltrate or consolidation.  No pleural effusion.  No pneumothorax.     Impression: CONCLUSION: No evidence of active disease. Electronically signed by:  Naveed Taylor MD  4/3/2017 5:55 PM CDT Workstation: [id number]    Xr Chest 2 View    Result Date: 3/26/2017  Narrative: Patient Name:  MRS. CHIQUITA BAILEY Patient ID:  [id number] Ordering:  ROBIN GUO Attending:  ROBIN GUO Referring:  ROBIN GUO  ------------------------------------------------ TWO VIEW CHEST HISTORY: Chest pain Frontal and lateral films of the chest were obtained. COMPARISON: March 11, 2017 The lungs are clear of an acute process. Unchanged elevation right hemidiaphragm. Sternotomy. The heart is not enlarged. The pulmonary vasculature is not increased. No pleural effusion. No pneumothorax. No acute osseous abnormality. Surgical clips right upper quadrant of the abdomen.     Impression: CONCLUSION: No Acute Disease Sternotomy [id number] Electronically signed by:  David Gamboa MD  3/26/2017 6:55 PM CDT Workstation: [id number]    Xr Chest 2 View    Result Date: 3/11/2017  Narrative: Patient Name:  MRS. CHIQUITA BAILEY Patient ID:  [id number] Ordering:  ROBIN MONTENEGRO Attending:  ROBIN MONTENEGRO Referring:  ROBIN MONTENEGRO ------------------------------------------------ PROCEDURE: XR CHEST 2 VIEWS INDICATION FOR PROCEDURE:  57 years -old patient presents for evaluation of chest pain. COMPARISON:  November 11, 2016. FINDINGS: XR CHEST 2 views  reveal the lungs are expanded. Patient has had a sternotomy. Cardiac monitoring leads are present. There is moderate elevation of the right hemidiaphragm. There is no radiographic evidence for airspace consolidation, pleural effusion or pneumothorax. Mediastinal and cardiac silhouettes are within normal limits. The bones are osteopenic. There is mild curvature of the thoracic spine convexity towards the right. There are mild degenerative changes at both shoulders. Surgical clips are visible in the right upper quadrant.     Impression: No radiographic evidence of acute cardiopulmonary disease. Electronically signed by:  Hayley Gamboa MD  3/11/2017 12:03 PM CST Workstation: [employer]                [facility]    Final diagnoses:   Atypical chest pain   Diarrhea, unspecified type   Orthostatic hypotension            True Crocker MD  04/03/17 1836       True Crocker MD  04/03/17 2017

## 2017-04-03 NOTE — ED NOTES
Pt reminded that we need a urine sample. Pt stated she will notify me when she is ready     Elvis Umanzor, RN  04/03/17 8111

## 2017-04-04 LAB
ANION GAP SERPL CALCULATED.3IONS-SCNC: 11 MMOL/L (ref 5–15)
BACTERIA SPEC AEROBE CULT: NORMAL
BASOPHILS # BLD AUTO: 0.02 10*3/MM3 (ref 0–0.2)
BASOPHILS NFR BLD AUTO: 0.2 % (ref 0–2)
BUN BLD-MCNC: 14 MG/DL (ref 7–21)
BUN/CREAT SERPL: 12.4 (ref 7–25)
CALCIUM SPEC-SCNC: 8.3 MG/DL (ref 8.4–10.2)
CHLORIDE SERPL-SCNC: 100 MMOL/L (ref 95–110)
CO2 SERPL-SCNC: 24 MMOL/L (ref 22–31)
CREAT BLD-MCNC: 1.13 MG/DL (ref 0.5–1)
DEPRECATED RDW RBC AUTO: 40.6 FL (ref 36.4–46.3)
EOSINOPHIL # BLD AUTO: 0.21 10*3/MM3 (ref 0–0.7)
EOSINOPHIL NFR BLD AUTO: 2.5 % (ref 0–7)
ERYTHROCYTE [DISTWIDTH] IN BLOOD BY AUTOMATED COUNT: 13.3 % (ref 11.5–14.5)
GFR SERPL CREATININE-BSD FRML MDRD: 50 ML/MIN/1.73 (ref 51–120)
GLUCOSE BLD-MCNC: 225 MG/DL (ref 60–100)
GLUCOSE BLDC GLUCOMTR-MCNC: 187 MG/DL (ref 70–130)
GLUCOSE BLDC GLUCOMTR-MCNC: 201 MG/DL (ref 70–130)
GLUCOSE BLDC GLUCOMTR-MCNC: 255 MG/DL (ref 70–130)
GLUCOSE BLDC GLUCOMTR-MCNC: 88 MG/DL (ref 70–130)
HCT VFR BLD AUTO: 34.3 % (ref 35–45)
HGB BLD-MCNC: 11.7 G/DL (ref 12–15.5)
IMM GRANULOCYTES # BLD: 0.19 10*3/MM3 (ref 0–0.02)
IMM GRANULOCYTES NFR BLD: 2.3 % (ref 0–0.5)
LYMPHOCYTES # BLD AUTO: 2.77 10*3/MM3 (ref 0.6–4.2)
LYMPHOCYTES NFR BLD AUTO: 33.6 % (ref 10–50)
MCH RBC QN AUTO: 28.9 PG (ref 26.5–34)
MCHC RBC AUTO-ENTMCNC: 34.1 G/DL (ref 31.4–36)
MCV RBC AUTO: 84.7 FL (ref 80–98)
MONOCYTES # BLD AUTO: 0.73 10*3/MM3 (ref 0–0.9)
MONOCYTES NFR BLD AUTO: 8.9 % (ref 0–12)
NEUTROPHILS # BLD AUTO: 4.32 10*3/MM3 (ref 2–8.6)
NEUTROPHILS NFR BLD AUTO: 52.5 % (ref 37–80)
PLATELET # BLD AUTO: 503 10*3/MM3 (ref 150–450)
PMV BLD AUTO: 9.3 FL (ref 8–12)
POTASSIUM BLD-SCNC: 2.9 MMOL/L (ref 3.5–5.1)
RBC # BLD AUTO: 4.05 10*6/MM3 (ref 3.77–5.16)
SODIUM BLD-SCNC: 135 MMOL/L (ref 137–145)
TROPONIN I SERPL-MCNC: <0.012 NG/ML
WBC NRBC COR # BLD: 8.24 10*3/MM3 (ref 3.2–9.8)

## 2017-04-04 PROCEDURE — 94799 UNLISTED PULMONARY SVC/PX: CPT

## 2017-04-04 PROCEDURE — 63710000001 INSULIN DETEMIR PER 5 UNITS: Performed by: FAMILY MEDICINE

## 2017-04-04 PROCEDURE — 25010000002 LEVOFLOXACIN PER 250 MG: Performed by: FAMILY MEDICINE

## 2017-04-04 PROCEDURE — 94760 N-INVAS EAR/PLS OXIMETRY 1: CPT

## 2017-04-04 PROCEDURE — 63710000001 INSULIN ASPART PER 5 UNITS: Performed by: FAMILY MEDICINE

## 2017-04-04 PROCEDURE — 85025 COMPLETE CBC W/AUTO DIFF WBC: CPT | Performed by: FAMILY MEDICINE

## 2017-04-04 PROCEDURE — 84484 ASSAY OF TROPONIN QUANT: CPT | Performed by: FAMILY MEDICINE

## 2017-04-04 PROCEDURE — 82962 GLUCOSE BLOOD TEST: CPT

## 2017-04-04 PROCEDURE — 84484 ASSAY OF TROPONIN QUANT: CPT | Performed by: EMERGENCY MEDICINE

## 2017-04-04 RX ORDER — LEVOFLOXACIN 5 MG/ML
500 INJECTION, SOLUTION INTRAVENOUS EVERY 24 HOURS
Status: DISCONTINUED | OUTPATIENT
Start: 2017-04-04 | End: 2017-04-06 | Stop reason: HOSPADM

## 2017-04-04 RX ORDER — BENZONATATE 100 MG/1
100 CAPSULE ORAL 3 TIMES DAILY PRN
Status: DISCONTINUED | OUTPATIENT
Start: 2017-04-04 | End: 2017-04-06 | Stop reason: HOSPADM

## 2017-04-04 RX ORDER — POTASSIUM CHLORIDE 750 MG/1
40 CAPSULE, EXTENDED RELEASE ORAL 2 TIMES DAILY WITH MEALS
Status: DISCONTINUED | OUTPATIENT
Start: 2017-04-04 | End: 2017-04-06 | Stop reason: HOSPADM

## 2017-04-04 RX ADMIN — HYDROCHLOROTHIAZIDE 12.5 MG: 12.5 CAPSULE ORAL at 08:59

## 2017-04-04 RX ADMIN — NAPROXEN 250 MG: 250 TABLET ORAL at 17:57

## 2017-04-04 RX ADMIN — RANOLAZINE 1000 MG: 500 TABLET, FILM COATED, EXTENDED RELEASE ORAL at 17:56

## 2017-04-04 RX ADMIN — ONDANSETRON 8 MG: 4 TABLET, FILM COATED ORAL at 16:23

## 2017-04-04 RX ADMIN — INSULIN ASPART 3 UNITS: 100 INJECTION, SOLUTION INTRAVENOUS; SUBCUTANEOUS at 11:28

## 2017-04-04 RX ADMIN — POTASSIUM CHLORIDE 40 MEQ: 750 CAPSULE, EXTENDED RELEASE ORAL at 03:15

## 2017-04-04 RX ADMIN — FUROSEMIDE 40 MG: 40 TABLET ORAL at 08:59

## 2017-04-04 RX ADMIN — ARIPIPRAZOLE 15 MG: 15 TABLET ORAL at 08:59

## 2017-04-04 RX ADMIN — MAGNESIUM OXIDE TAB 400 MG (241.3 MG ELEMENTAL MG) 400 MG: 400 (241.3 MG) TAB at 17:56

## 2017-04-04 RX ADMIN — ASPIRIN 81 MG 81 MG: 81 TABLET ORAL at 08:59

## 2017-04-04 RX ADMIN — BENZONATATE 100 MG: 100 CAPSULE ORAL at 17:56

## 2017-04-04 RX ADMIN — LISINOPRIL 2.5 MG: 2.5 TABLET ORAL at 08:59

## 2017-04-04 RX ADMIN — ONDANSETRON 8 MG: 4 TABLET, FILM COATED ORAL at 01:05

## 2017-04-04 RX ADMIN — ONDANSETRON 8 MG: 4 TABLET, FILM COATED ORAL at 07:38

## 2017-04-04 RX ADMIN — INSULIN ASPART 40 UNITS: 100 INJECTION, SOLUTION INTRAVENOUS; SUBCUTANEOUS at 11:27

## 2017-04-04 RX ADMIN — INSULIN ASPART 6 UNITS: 100 INJECTION, SOLUTION INTRAVENOUS; SUBCUTANEOUS at 07:39

## 2017-04-04 RX ADMIN — PANTOPRAZOLE SODIUM 40 MG: 40 TABLET, DELAYED RELEASE ORAL at 08:59

## 2017-04-04 RX ADMIN — LEVOFLOXACIN 500 MG: 5 INJECTION, SOLUTION INTRAVENOUS at 03:15

## 2017-04-04 RX ADMIN — INSULIN ASPART 5 UNITS: 100 INJECTION, SOLUTION INTRAVENOUS; SUBCUTANEOUS at 21:04

## 2017-04-04 RX ADMIN — NAPROXEN 250 MG: 250 TABLET ORAL at 07:38

## 2017-04-04 RX ADMIN — RANOLAZINE 1000 MG: 500 TABLET, FILM COATED, EXTENDED RELEASE ORAL at 08:59

## 2017-04-04 RX ADMIN — MIRTAZAPINE 45 MG: 15 TABLET, FILM COATED ORAL at 01:00

## 2017-04-04 RX ADMIN — GABAPENTIN 600 MG: 300 CAPSULE ORAL at 06:23

## 2017-04-04 RX ADMIN — GABAPENTIN 600 MG: 300 CAPSULE ORAL at 14:16

## 2017-04-04 RX ADMIN — SODIUM CHLORIDE 100 ML/HR: 9 INJECTION, SOLUTION INTRAVENOUS at 10:40

## 2017-04-04 RX ADMIN — VENLAFAXINE HYDROCHLORIDE 150 MG: 75 CAPSULE, EXTENDED RELEASE ORAL at 07:38

## 2017-04-04 RX ADMIN — OSELTAMIVIR PHOSPHATE 75 MG: 75 CAPSULE ORAL at 08:59

## 2017-04-04 RX ADMIN — ACETAMINOPHEN 650 MG: 325 TABLET ORAL at 21:54

## 2017-04-04 RX ADMIN — CLOPIDOGREL BISULFATE 75 MG: 75 TABLET ORAL at 08:58

## 2017-04-04 RX ADMIN — MAGNESIUM OXIDE TAB 400 MG (241.3 MG ELEMENTAL MG) 400 MG: 400 (241.3 MG) TAB at 08:59

## 2017-04-04 RX ADMIN — CARVEDILOL 3.12 MG: 3.12 TABLET, FILM COATED ORAL at 07:38

## 2017-04-04 RX ADMIN — ATORVASTATIN CALCIUM 40 MG: 40 TABLET, FILM COATED ORAL at 16:23

## 2017-04-04 RX ADMIN — SODIUM CHLORIDE 1000 ML: 9 INJECTION, SOLUTION INTRAVENOUS at 21:04

## 2017-04-04 RX ADMIN — CARVEDILOL 3.12 MG: 3.12 TABLET, FILM COATED ORAL at 17:57

## 2017-04-04 RX ADMIN — INSULIN ASPART 40 UNITS: 100 INJECTION, SOLUTION INTRAVENOUS; SUBCUTANEOUS at 07:39

## 2017-04-04 RX ADMIN — INSULIN DETEMIR 70 UNITS: 100 INJECTION, SOLUTION SUBCUTANEOUS at 21:03

## 2017-04-04 RX ADMIN — POTASSIUM CHLORIDE 40 MEQ: 750 CAPSULE, EXTENDED RELEASE ORAL at 17:56

## 2017-04-04 RX ADMIN — INSULIN DETEMIR 70 UNITS: 100 INJECTION, SOLUTION SUBCUTANEOUS at 00:06

## 2017-04-04 RX ADMIN — GABAPENTIN 600 MG: 300 CAPSULE ORAL at 01:00

## 2017-04-04 RX ADMIN — DOCUSATE SODIUM 100 MG: 100 CAPSULE, LIQUID FILLED ORAL at 08:59

## 2017-04-04 RX ADMIN — SODIUM CHLORIDE 125 ML/HR: 9 INJECTION, SOLUTION INTRAVENOUS at 16:28

## 2017-04-04 NOTE — PROGRESS NOTES
[facility]  INPATIENT PROGRESS NOTE    Length of Stay: 0  Date of Admission: 4/3/2017  Primary Care Physician: Francisca Fong MD    Subjective   Chief Complaint:   Chief Complaint   Patient presents with   • Flu Symptoms         HPI      Review of Systems   Constitutional: Negative for activity change, appetite change, chills, diaphoresis, fatigue, fever and unexpected weight change.   HENT: Negative.  Negative for congestion, dental problem, drooling, ear discharge, ear pain, facial swelling, hearing loss, mouth sores, nosebleeds, postnasal drip, rhinorrhea, sinus pressure, sneezing, tinnitus, trouble swallowing and voice change.    Eyes: Negative for photophobia and discharge.   Respiratory: Negative for apnea, cough, choking, shortness of breath, wheezing and stridor.    Cardiovascular: Negative for chest pain, palpitations and leg swelling.   Gastrointestinal: Negative for abdominal pain, anal bleeding, blood in stool, constipation, diarrhea, nausea, rectal pain and vomiting.   Endocrine: Negative for cold intolerance, heat intolerance, polydipsia, polyphagia and polyuria.   Genitourinary: Negative for dysuria, enuresis, frequency, hematuria and urgency.   Musculoskeletal: Negative for arthralgias, back pain, joint swelling, myalgias, neck pain and neck stiffness.   Skin: Negative for color change, pallor, rash and wound.   Allergic/Immunologic: Negative for food allergies and immunocompromised state.   Neurological: Negative for dizziness, tremors, seizures, syncope, facial asymmetry, speech difficulty, weakness, light-headedness, numbness and headaches.   Hematological: Negative for adenopathy.   Psychiatric/Behavioral: Negative for agitation, behavioral problems, confusion, hallucinations, sleep disturbance and suicidal ideas. The patient is not nervous/anxious.         All pertinent negatives and positives are as above. All other systems have  been reviewed and are negative unless otherwise stated.     Objective    Temp:  [96.5 °F (35.8 °C)-98.4 °F (36.9 °C)] 97.1 °F (36.2 °C)  Heart Rate:  [64-95] 72  Resp:  [18] 18  BP: ()/(44-72) 93/54  Physical Exam   Constitutional: She is oriented to person, place, and time. She appears well-developed and well-nourished.  Non-toxic appearance. She does not have a sickly appearance. She does not appear ill. No distress. She is not intubated.   HENT:   Head: Normocephalic and atraumatic.   Right Ear: External ear normal.   Left Ear: External ear normal.   Nose: Nose normal.   Mouth/Throat: Oropharynx is clear and moist. No oropharyngeal exudate.   Eyes: Conjunctivae and EOM are normal. Pupils are equal, round, and reactive to light. Right eye exhibits no discharge and no exudate. Left eye exhibits no discharge and no exudate. Right conjunctiva is not injected. Right conjunctiva has no hemorrhage. Left conjunctiva is not injected. Left conjunctiva has no hemorrhage. No scleral icterus.   Neck: Normal range of motion. Neck supple. No hepatojugular reflux and no JVD present. No tracheal tenderness, no spinous process tenderness and no muscular tenderness present. Carotid bruit is not present. No rigidity. No tracheal deviation, no edema, no erythema and normal range of motion present. No thyroid mass and no thyromegaly present.   Cardiovascular: Normal rate, regular rhythm, normal heart sounds and intact distal pulses.   No extrasystoles are present. Exam reveals no gallop and no friction rub.    No murmur heard.  Pulmonary/Chest: Effort normal and breath sounds normal. No stridor. She is not intubated. No respiratory distress. She has no decreased breath sounds. She has no wheezes. She has no rhonchi. She has no rales. She exhibits no tenderness.   Abdominal: Soft. Normal appearance and bowel sounds are normal. She exhibits no shifting dullness, no distension, no pulsatile liver, no fluid wave, no abdominal bruit,  no ascites, no pulsatile midline mass and no mass. There is no hepatosplenomegaly, splenomegaly or hepatomegaly. There is no tenderness. There is no rigidity, no rebound, no guarding, no CVA tenderness, no tenderness at McBurney's point and negative Coombs's sign. No hernia.   Genitourinary: Rectal exam shows guaiac negative stool.   Musculoskeletal: Normal range of motion. She exhibits no edema, tenderness or deformity.        Right shoulder: She exhibits no swelling.      Skin Integrity  -   She hasno right foot ulcer, non-callous right foot, no right foot warmth and no right foot blister. She has no left foot ulcer, non-callous left foot, no left foot warmth and no left foot blister..  Lymphadenopathy:     She has no cervical adenopathy.     She has no axillary adenopathy.   Neurological: She is alert and oriented to person, place, and time. She has normal strength and normal reflexes. She is not disoriented. She displays no atrophy, no tremor and normal reflexes. No cranial nerve deficit or sensory deficit. She exhibits normal muscle tone. She displays no seizure activity. Coordination and gait normal. She displays no Babinski's sign on the right side. She displays no Babinski's sign on the left side.   Skin: Skin is warm and dry. No abrasion, no bruising, no burn, no ecchymosis, no laceration, no lesion, no purpura and no rash noted. Rash is not macular, not papular, not maculopapular, not nodular, not pustular, not vesicular and not urticarial. She is not diaphoretic. No cyanosis or erythema. No pallor. Nails show no clubbing.   Psychiatric: She has a normal mood and affect. Her behavior is normal. Judgment and thought content normal. Her mood appears not anxious. Her affect is not angry, not blunt, not labile and not inappropriate. Her speech is not slurred. She is not agitated, not aggressive, not hyperactive, not slowed, not withdrawn and not combative. Thought content is not paranoid and not delusional.  Cognition and memory are not impaired. She does not express impulsivity or inappropriate judgment. She does not exhibit a depressed mood. She expresses no homicidal and no suicidal ideation. She expresses no suicidal plans and no homicidal plans. She is communicative. She exhibits normal recent memory and normal remote memory.           Results Review:  I have reviewed the labs, radiology results, and diagnostic studies.    Laboratory Data:   Lab Results (last 24 hours)     Procedure Component Value Units Date/Time    Comprehensive Metabolic Panel [[id number]]  (Abnormal) Collected:  04/03/17 1729    Specimen:  Blood from Arm, Left Updated:  04/03/17 1758     Glucose 83 mg/dL      BUN 14 mg/dL      Creatinine 1.14 (H) mg/dL      Sodium 142 mmol/L      Potassium 3.3 (L) mmol/L      Chloride 97 mmol/L      CO2 29.0 mmol/L      Calcium 9.8 mg/dL      Total Protein 7.8 g/dL      Albumin 4.40 g/dL      ALT (SGPT) 35 U/L      AST (SGOT) 95 (H) U/L      Alkaline Phosphatase 84 U/L      Total Bilirubin 0.6 mg/dL      eGFR Non African Amer 49 (L) mL/min/1.73      Globulin 3.4 gm/dL      A/G Ratio 1.3 g/dL      BUN/Creatinine Ratio 12.3     Anion Gap 16.0 (H) mmol/L     CBC Auto Differential [[id number]]  (Abnormal) Collected:  04/03/17 1729    Specimen:  Blood from Arm, Left Updated:  04/03/17 1800     WBC 10.62 (H) 10*3/mm3      RBC 4.92 10*6/mm3      Hemoglobin 14.1 g/dL      Hematocrit 41.1 %      MCV 83.5 fL      MCH 28.7 pg      MCHC 34.3 g/dL      RDW 13.5 %      RDW-SD 40.7 fl      MPV 9.0 fL      Platelets 671 (H) 10*3/mm3      Neutrophil % 60.5 %      Lymphocyte % 27.8 %      Monocyte % 7.7 %      Eosinophil % 1.8 %      Basophil % 0.3 %      Immature Grans % 1.9 (H) %      Neutrophils, Absolute 6.43 10*3/mm3      Lymphocytes, Absolute 2.95 10*3/mm3      Monocytes, Absolute 0.82 10*3/mm3      Eosinophils, Absolute 0.19 10*3/mm3      Basophils, Absolute 0.03 10*3/mm3      Immature Grans, Absolute 0.20 (H) 10*3/mm3       nRBC 0.0 /100 WBC     CBC & Differential [[id number]] Collected:  04/03/17 1729    Specimen:  Blood Updated:  04/03/17 1834    Narrative:       The following orders were created for panel order CBC & Differential.  Procedure                               Abnormality         Status                     ---------                               -----------         ------                     Scan Slide[[id number]]                                        Final result               CBC Auto Differential[[id number]]         Abnormal            Final result                 Please view results for these tests on the individual orders.    Scan Slide [[id number]] Collected:  04/03/17 1729    Specimen:  Blood from Arm, Left Updated:  04/03/17 1834     RBC Morphology Normal     WBC Morphology Normal     Platelet Estimate Increased    Protime-INR [[id number]]  (Normal) Collected:  04/03/17 1729    Specimen:  Blood from Arm, Left Updated:  04/03/17 1846     Protime 12.6 Seconds      INR 0.95    Narrative:       Therapeutic range for most indications is 2.0-3.0 INR,  or 2.5-3.5 for mechanical heart valves.    aPTT [[id number]]  (Normal) Collected:  04/03/17 1729    Specimen:  Blood from Arm, Left Updated:  04/03/17 1846     PTT 23.3 seconds     Narrative:       The recommended Heparin therapeutic range is 68-97 seconds.    CK [[id number]]  (Normal) Collected:  04/03/17 1741    Specimen:  Blood Updated:  04/03/17 1846     Creatine Kinase 61 U/L     CK-MB [[id number]]  (Normal) Collected:  04/03/17 1741    Specimen:  Blood Updated:  04/03/17 1856     CKMB 1.05 ng/mL     Troponin [[id number]]  (Normal) Collected:  04/03/17 1741    Specimen:  Blood Updated:  04/03/17 2034     Troponin I <0.012 ng/mL     Urinalysis With / Culture If Indicated [[id number]]  (Abnormal) Collected:  04/03/17 2016    Specimen:  Urine from Urine, Clean Catch Updated:  04/03/17 2050     Color, UA Dark Yellow     Appearance, UA Turbid (A)     pH, UA <=5.0     Specific Gravity, UA  1.021     Glucose,  mg/dL (1+) (A)     Ketones, UA Trace (A)     Bilirubin, UA Moderate (2+) (A)     Blood, UA Negative     Protein, UA Trace (A)     Leuk Esterase, UA Small (1+) (A)     Nitrite, UA Negative     Urobilinogen, UA 1.0 E.U./dL    Troponin [[id number]]  (Normal) Collected:  04/03/17 2031    Specimen:  Blood Updated:  04/03/17 2112     Troponin I <0.012 ng/mL     Urinalysis, Microscopic Only [[id number]]  (Abnormal) Collected:  04/03/17 2016    Specimen:  Urine from Urine, Clean Catch Updated:  04/03/17 2133     RBC, UA 0-2 (A) /HPF      WBC, UA 6-12 (A) /HPF      Bacteria, UA 3+ (A) /HPF      Squamous Epithelial Cells, UA 21-30 (A) /HPF      Yeast, UA Small/1+ Budding Yeast /HPF      Hyaline Casts, UA 13-20 /LPF      Methodology Manual Light Microscopy    [place] Draw [[id number]] Collected:  04/03/17 1729    Specimen:  Blood Updated:  04/03/17 2202    Narrative:       The following orders were created for panel order [place] Draw.  Procedure                               Abnormality         Status                     ---------                               -----------         ------                     Light Blue Top[[id number]]                                    Final result               Green Top (Gel)[[id number]]                                   Final result               Lavender Top[[id number]]                                      Final result               Gold Top - SST[[id number]]                                    Final result                 Please view results for these tests on the individual orders.    Light Blue Top [[id number]] Collected:  04/03/17 1729    Specimen:  Blood from Arm, Left Updated:  04/03/17 2202     Extra Tube hold for add-on      Auto resulted       Green Top (Gel) [[id number]] Collected:  04/03/17 1729    Specimen:  Blood from Arm, Left Updated:  04/03/17 2202     Extra Tube Hold for add-ons.      Auto resulted.       Lavender Top [[id number]] Collected:  04/03/17 1729    Specimen:   Blood from Arm, Left Updated:  04/03/17 2202     Extra Tube hold for add-on      Auto resulted       Gold Top - SST [[id number]] Collected:  04/03/17 1729    Specimen:  Blood from Arm, Left Updated:  04/03/17 2202     Extra Tube Hold for add-ons.      Auto resulted.       Extra Tubes [[id number]] Collected:  04/03/17 1741    Specimen:  Blood from Blood, Venous Line Updated:  04/03/17 2202    Narrative:       The following orders were created for panel order Extra Tubes.  Procedure                               Abnormality         Status                     ---------                               -----------         ------                     Green Top (Gel)[[id number]]                                   Final result                 Please view results for these tests on the individual orders.    Green Top (Gel) [[id number]] Collected:  04/03/17 1741    Specimen:  Blood Updated:  04/03/17 2202     Extra Tube Hold for add-ons.      Auto resulted.       Basic Metabolic Panel [[id number]]  (Abnormal) Collected:  04/03/17 2358    Specimen:  Blood Updated:  04/04/17 0039     Glucose 225 (H) mg/dL      BUN 14 mg/dL      Creatinine 1.13 (H) mg/dL      Sodium 135 (L) mmol/L      Potassium 2.9 (L) mmol/L      Chloride 100 mmol/L      CO2 24.0 mmol/L      Calcium 8.3 (L) mg/dL      eGFR Non African Amer 50 (L) mL/min/1.73      BUN/Creatinine Ratio 12.4     Anion Gap 11.0 mmol/L     Troponin [[id number]]  (Normal) Collected:  04/03/17 2358    Specimen:  Blood Updated:  04/04/17 0041     Troponin I <0.012 ng/mL     Troponin [[id number]]  (Normal) Collected:  04/04/17 0504    Specimen:  Blood Updated:  04/04/17 0618     Troponin I <0.012 ng/mL     CBC & Differential [[id number]] Collected:  04/04/17 0504    Specimen:  Blood Updated:  04/04/17 0624    Narrative:       The following orders were created for panel order CBC & Differential.  Procedure                               Abnormality         Status                     ---------                                -----------         ------                     CBC Auto Differential[[id number]]         Abnormal            Final result                 Please view results for these tests on the individual orders.    CBC Auto Differential [[id number]]  (Abnormal) Collected:  04/04/17 0504    Specimen:  Blood Updated:  04/04/17 0624     WBC 8.24 10*3/mm3      RBC 4.05 10*6/mm3      Hemoglobin 11.7 (L) g/dL      Hematocrit 34.3 (L) %      MCV 84.7 fL      MCH 28.9 pg      MCHC 34.1 g/dL      RDW 13.3 %      RDW-SD 40.6 fl      MPV 9.3 fL      Platelets 503 (H) 10*3/mm3      Neutrophil % 52.5 %      Lymphocyte % 33.6 %      Monocyte % 8.9 %      Eosinophil % 2.5 %      Basophil % 0.2 %      Immature Grans % 2.3 (H) %      Neutrophils, Absolute 4.32 10*3/mm3      Lymphocytes, Absolute 2.77 10*3/mm3      Monocytes, Absolute 0.73 10*3/mm3      Eosinophils, Absolute 0.21 10*3/mm3      Basophils, Absolute 0.02 10*3/mm3      Immature Grans, Absolute 0.19 (H) 10*3/mm3     POC Glucose Fingerstick [[id number]]  (Abnormal) Collected:  04/04/17 0638    Specimen:  Blood Updated:  04/04/17 0652     Glucose 255 (H) mg/dL       RN NotifiedMeter: [id number]: [id number] WILLOW HESS       Troponin [[id number]]  (Normal) Collected:  04/04/17 0847    Specimen:  Blood Updated:  04/04/17 0938     Troponin I <0.012 ng/mL     POC Glucose Fingerstick [[id number]]  (Abnormal) Collected:  04/04/17 1002    Specimen:  Blood Updated:  04/04/17 1022     Glucose 187 (H) mg/dL       RN NotifiedMeter: [id number]: [id number] SHAISTA MORA       Troponin [[id number]]  (Normal) Collected:  04/04/17 1115    Specimen:  Blood Updated:  04/04/17 1213     Troponin I <0.012 ng/mL     Urine Culture [[id number]] Collected:  04/03/17 2016    Specimen:  Urine from Urine, Clean Catch Updated:  04/04/17 1250     Urine Culture Mixed Culture    Narrative:         Specimen contains mixed organisms of questionable pathogenicity which indicates contamination  with commensal marcelino.  Further identification is unlikely to provide clinically useful information.  Suggest recollection.          Culture Data:   No results found for: BLOODCX  Urine Culture   Date Value Ref Range Status   04/03/2017 Mixed Culture  Final     No results found for: RESPCX  No results found for: WOUNDCX  No results found for: STOOLCX  No components found for: BODYFLD    Radiology Data:   Imaging Results (last 24 hours)     Procedure Component Value Units Date/Time    XR Chest 2 View [[id number]] Collected:  04/03/17 1753     Updated:  04/03/17 1756    Narrative:       Patient Name:  MRS. CHIQUITA BAILEY  Patient ID:  [id number]   Ordering:  RUDDY ACOSTA  Attending:  RUDDY ACOSTA  Referring:  RUDDY ACOSTA  ------------------------------------------------  Chest PA and lateral  CLINICAL INDICATION: Shortness of breath. Cough    COMPARISON: March 26, 2017.    FINDINGS: Cardiac silhouette is normal in size. Pulmonary  vascularity is unremarkable.   Midline sternal sutures from prior cardiac surgery. Chronic  elevation right diaphragm. Subtle fibrotic changes right midlung  field.  No focal infiltrate or consolidation.  No pleural effusion.  No  pneumothorax.      Impression:       CONCLUSION: No evidence of active disease.    Electronically signed by:  Naveed Taylor MD  4/3/2017 5:55 PM CDT  Workstation: [id number]          I have reviewed the patient current medications.     Assessment/Plan     Assessment:  1) Orthostatic hypotension  2) Acute diarrhea  3) Hypokalemia  4) Coronary artery disease   5) Cystitis   6) Type II diabetes mellitus        Plan:  - Will give the patient IVF, monitor blood pressure  - Will check stool studies and C. Diff toxin. If c diff negative, can start Imodium for symptom relief. Supportive care with IVF  - Will give PO KCL BID for electrolyte replacement  - Will start patient on Levaquin for suspected cystitis on UA, will check urine culture  - Continue current home  medications  - Will add insulin sliding scale for glycemic control  - SCDs/TEDs for DVT PPx           Naomi Gardner MD   04/04/17   4:02 PM

## 2017-04-04 NOTE — PLAN OF CARE
Problem: Patient Care Overview (Adult)  Goal: Plan of Care Review  Outcome: Ongoing (interventions implemented as appropriate)  Pt rested well and was difficult to awaken at times.  No s/s of pain or soa.    04/04/17 0446   Coping/Psychosocial Response Interventions   Plan Of Care Reviewed With patient   Patient Care Overview   Progress no change         Problem: Acute Coronary Syndrome (ACS) (Adult)  Goal: Signs and Symptoms of Listed Potential Problems Will be Absent or Manageable (Acute Coronary Syndrome)  Outcome: Ongoing (interventions implemented as appropriate)    Problem: Diabetes, Type 2 (Adult)  Goal: Signs and Symptoms of Listed Potential Problems Will be Absent or Manageable (Diabetes, Type 2)  Outcome: Ongoing (interventions implemented as appropriate)

## 2017-04-04 NOTE — ED NOTES
2nd troponin sent to lab. Lab informed that this was the 2nd troponin     Elvis Umanzor, RN  04/03/17 4520

## 2017-04-04 NOTE — PLAN OF CARE
Problem: Patient Care Overview (Adult)  Goal: Plan of Care Review  Outcome: Ongoing (interventions implemented as appropriate)    04/04/17 2425   Coping/Psychosocial Response Interventions   Plan Of Care Reviewed With patient   Patient Care Overview   Progress no change       Goal: Adult Individualization and Mutuality  Outcome: Ongoing (interventions implemented as appropriate)  Goal: Discharge Needs Assessment  Outcome: Ongoing (interventions implemented as appropriate)    Problem: Acute Coronary Syndrome (ACS) (Adult)  Goal: Signs and Symptoms of Listed Potential Problems Will be Absent or Manageable (Acute Coronary Syndrome)  Outcome: Ongoing (interventions implemented as appropriate)    Problem: Diabetes, Type 2 (Adult)  Goal: Signs and Symptoms of Listed Potential Problems Will be Absent or Manageable (Diabetes, Type 2)  Outcome: Ongoing (interventions implemented as appropriate)

## 2017-04-04 NOTE — H&P
[facility] Medicine Admission      Date of Admission: 4/3/2017      Primary Care Physician: Francisca Fong MD      Chief Complaint: Dizziness, fall     HPI: Patient presents to ED with complaints of feeling dizzy and having a fall at home. Patient reports that she was standing up to ambulate, when she felt dizzy upon standing. Afterwards, she did fall. No loss of consciousness after fall, did not hit her head. No blurry vision or aura symptoms before the fall. After the fall, no loss of bowel/bladder habits. For the past several days, complaining of having watery diarrhea. She is recently getting over a recent influenza infection. Today, she states she has some shortness of air at times with tightness in her chest occasionally which she attributes to her recent infection. No N/V reported. No diaphoresis reported.     Past Medical History:  has a past medical history of Acquired hypothyroidism; Angina, class IV; Anxiety; Benign paroxysmal positional vertigo; Carpal tunnel syndrome; Chronic pain; Coronary atherosclerosis; Depression; Diabetes mellitus; Diabetic polyneuropathy; Female stress incontinence; Hashimoto's thyroiditis; Hyperlipidemia; Hypertension; Low back pain; Malaise and fatigue; Multiple joint pain; Obesity; Otalgia; Perforation of tympanic membrane; Postoperative wound infection; and Vitamin D deficiency.    Past Surgical History:  has a past surgical history that includes Abdominal surgery; Angioplasty; Coronary artery bypass graft; Cardiac catheterization; Carpal tunnel release; Cholecystectomy; endoscopy and colonoscopy; Mouth surgery; transesophageal echocardiogram ([place]); Foot surgery; gastric banding; Hysterectomy; Shoulder surgery; and Salpingoophorectomy.    Family History: family history includes Diabetes in her other; Heart disease in her other; Hypertension in her other.    Social History:  reports that she has never smoked. She has never  used smokeless tobacco. She reports that she does not drink alcohol or use illicit drugs.    Allergies:   Allergies   Allergen Reactions   • Seroquel [Quetiapine Fumarate] Anaphylaxis   • Avandia [Rosiglitazone] Swelling   • Morphine And Related Hallucinations     If patient takes to much       Medications: Scheduled Meds:  ARIPiprazole 15 mg Oral Daily   aspirin 81 mg Oral Daily   atorvastatin 40 mg Oral Q PM   carvedilol 3.125 mg Oral BID With Meals   clopidogrel 75 mg Oral Daily   docusate sodium 100 mg Oral BID   furosemide 40 mg Oral Daily   gabapentin 600 mg Oral Q8H   hydrochlorothiazide 12.5 mg Oral Daily   insulin aspart 0-14 Units Subcutaneous 4x Daily AC & at Bedtime   insulin aspart 40 Units Subcutaneous TID With Meals   insulin detemir 70 Units Subcutaneous Nightly   levoFLOXacin 500 mg Intravenous Q24H   lisinopril 2.5 mg Oral Daily   magnesium oxide 400 mg Oral BID   mirtazapine 45 mg Oral Nightly   naproxen 250 mg Oral BID With Meals   ondansetron 8 mg Oral Q8H   oseltamivir 75 mg Oral BID   pantoprazole 40 mg Oral Daily   potassium chloride 40 mEq Oral BID With Meals   ranolazine 1,000 mg Oral BID   sodium chloride 1,000 mL Intravenous Once   venlafaxine  mg Oral Daily With Breakfast   [START ON 4/9/2017] vitamin D 50,000 Units Oral Q7 Days     Continuous Infusions:  sodium chloride 125 mL/hr Last Rate: Stopped (04/03/17 2304)   sodium chloride 100 mL/hr Last Rate: 100 mL/hr (04/03/17 2354)     PRN Meds:.•  acetaminophen  •  dextrose  •  dextrose  •  glucagon (human recombinant)  •  HYDROcodone-acetaminophen  •  LORazepam  •  nitroglycerin  •  ondansetron  •  sodium chloride  •  sodium chloride  •  Insert peripheral IV **AND** sodium chloride    Review of Systems:  Review of Systems   Constitutional: Negative for appetite change, chills and fever.   HENT: Negative for congestion, ear pain, rhinorrhea, sneezing and sore throat.    Respiratory: Positive for shortness of breath. Negative for  cough.    Cardiovascular: Positive for chest pain. Negative for palpitations and leg swelling.   Gastrointestinal: Positive for diarrhea. Negative for nausea and vomiting.   Endocrine: Negative for polyphagia and polyuria.   Musculoskeletal: Negative for back pain and neck pain.   Skin: Negative for color change and rash.   Neurological: Positive for dizziness. Negative for syncope and weakness.   Psychiatric/Behavioral: Negative for agitation, confusion and dysphoric mood.      Otherwise complete ROS is negative except as mentioned above.    Physical Exam:   Temp:  [96.5 °F (35.8 °C)-98.4 °F (36.9 °C)] 96.5 °F (35.8 °C)  Heart Rate:  [74-95] 74  Resp:  [18] 18  BP: ()/(47-72) 90/70  Physical Exam   Constitutional: She is oriented to person, place, and time. She appears well-developed and well-nourished.   Cardiovascular: Normal rate, regular rhythm and normal heart sounds.  Exam reveals no gallop and no friction rub.    No murmur heard.  Pulmonary/Chest: Effort normal and breath sounds normal. No respiratory distress. She has no wheezes. She has no rales.   Abdominal: Soft. Bowel sounds are normal. There is no tenderness.   Musculoskeletal: Normal range of motion. She exhibits no edema.   Neurological: She is alert and oriented to person, place, and time.   Skin: Skin is warm and dry.   Psychiatric: She has a normal mood and affect. Her behavior is normal.   Vitals reviewed.        Results Reviewed:  I have personally reviewed current lab, radiology, and data and agree with results.  Lab Results (last 24 hours)     Procedure Component Value Units Date/Time    Comprehensive Metabolic Panel [[id number]]  (Abnormal) Collected:  04/03/17 1729    Specimen:  Blood from Arm, Left Updated:  04/03/17 1758     Glucose 83 mg/dL      BUN 14 mg/dL      Creatinine 1.14 (H) mg/dL      Sodium 142 mmol/L      Potassium 3.3 (L) mmol/L      Chloride 97 mmol/L      CO2 29.0 mmol/L      Calcium 9.8 mg/dL      Total Protein 7.8 g/dL       Albumin 4.40 g/dL      ALT (SGPT) 35 U/L      AST (SGOT) 95 (H) U/L      Alkaline Phosphatase 84 U/L      Total Bilirubin 0.6 mg/dL      eGFR Non African Amer 49 (L) mL/min/1.73      Globulin 3.4 gm/dL      A/G Ratio 1.3 g/dL      BUN/Creatinine Ratio 12.3     Anion Gap 16.0 (H) mmol/L     CBC Auto Differential [[id number]]  (Abnormal) Collected:  04/03/17 1729    Specimen:  Blood from Arm, Left Updated:  04/03/17 1800     WBC 10.62 (H) 10*3/mm3      RBC 4.92 10*6/mm3      Hemoglobin 14.1 g/dL      Hematocrit 41.1 %      MCV 83.5 fL      MCH 28.7 pg      MCHC 34.3 g/dL      RDW 13.5 %      RDW-SD 40.7 fl      MPV 9.0 fL      Platelets 671 (H) 10*3/mm3      Neutrophil % 60.5 %      Lymphocyte % 27.8 %      Monocyte % 7.7 %      Eosinophil % 1.8 %      Basophil % 0.3 %      Immature Grans % 1.9 (H) %      Neutrophils, Absolute 6.43 10*3/mm3      Lymphocytes, Absolute 2.95 10*3/mm3      Monocytes, Absolute 0.82 10*3/mm3      Eosinophils, Absolute 0.19 10*3/mm3      Basophils, Absolute 0.03 10*3/mm3      Immature Grans, Absolute 0.20 (H) 10*3/mm3      nRBC 0.0 /100 WBC     CBC & Differential [[id number]] Collected:  04/03/17 1729    Specimen:  Blood Updated:  04/03/17 1834    Narrative:       The following orders were created for panel order CBC & Differential.  Procedure                               Abnormality         Status                     ---------                               -----------         ------                     Scan Slide[[id number]]                                        Final result               CBC Auto Differential[[id number]]         Abnormal            Final result                 Please view results for these tests on the individual orders.    Scan Slide [[id number]] Collected:  04/03/17 1729    Specimen:  Blood from Arm, Left Updated:  04/03/17 1834     RBC Morphology Normal     WBC Morphology Normal     Platelet Estimate Increased    Protime-INR [[id number]]  (Normal) Collected:  04/03/17 1729     Specimen:  Blood from Arm, Left Updated:  04/03/17 1846     Protime 12.6 Seconds      INR 0.95    Narrative:       Therapeutic range for most indications is 2.0-3.0 INR,  or 2.5-3.5 for mechanical heart valves.    aPTT [[id number]]  (Normal) Collected:  04/03/17 1729    Specimen:  Blood from Arm, Left Updated:  04/03/17 1846     PTT 23.3 seconds     Narrative:       The recommended Heparin therapeutic range is 68-97 seconds.    CK [[id number]]  (Normal) Collected:  04/03/17 1741    Specimen:  Blood Updated:  04/03/17 1846     Creatine Kinase 61 U/L     CK-MB [[id number]]  (Normal) Collected:  04/03/17 1741    Specimen:  Blood Updated:  04/03/17 1856     CKMB 1.05 ng/mL     Troponin [[id number]]  (Normal) Collected:  04/03/17 1741    Specimen:  Blood Updated:  04/03/17 2034     Troponin I <0.012 ng/mL     Urine Culture [[id number]] Collected:  04/03/17 2016    Specimen:  Urine from Urine, Clean Catch Updated:  04/03/17 2042    Urinalysis With / Culture If Indicated [[id number]]  (Abnormal) Collected:  04/03/17 2016    Specimen:  Urine from Urine, Clean Catch Updated:  04/03/17 2050     Color, UA Dark Yellow     Appearance, UA Turbid (A)     pH, UA <=5.0     Specific Gravity, UA 1.021     Glucose,  mg/dL (1+) (A)     Ketones, UA Trace (A)     Bilirubin, UA Moderate (2+) (A)     Blood, UA Negative     Protein, UA Trace (A)     Leuk Esterase, UA Small (1+) (A)     Nitrite, UA Negative     Urobilinogen, UA 1.0 E.U./dL    Troponin [[id number]]  (Normal) Collected:  04/03/17 2031    Specimen:  Blood Updated:  04/03/17 2112     Troponin I <0.012 ng/mL     Urinalysis, Microscopic Only [[id number]]  (Abnormal) Collected:  04/03/17 2016    Specimen:  Urine from Urine, Clean Catch Updated:  04/03/17 2133     RBC, UA 0-2 (A) /HPF      WBC, UA 6-12 (A) /HPF      Bacteria, UA 3+ (A) /HPF      Squamous Epithelial Cells, UA 21-30 (A) /HPF      Yeast, UA Small/1+ Budding Yeast /HPF      Hyaline Casts, UA 13-20 /LPF      Methodology Manual  Light Microscopy    [place] Draw [[id number]] Collected:  04/03/17 1729    Specimen:  Blood Updated:  04/03/17 2202    Narrative:       The following orders were created for panel order [place] Draw.  Procedure                               Abnormality         Status                     ---------                               -----------         ------                     Light Blue Top[[id number]]                                    Final result               Green Top (Gel)[[id number]]                                   Final result               Lavender Top[[id number]]                                      Final result               Gold Top - SST[[id number]]                                    Final result                 Please view results for these tests on the individual orders.    Light Blue Top [[id number]] Collected:  04/03/17 1729    Specimen:  Blood from Arm, Left Updated:  04/03/17 2202     Extra Tube hold for add-on      Auto resulted       Green Top (Gel) [[id number]] Collected:  04/03/17 1729    Specimen:  Blood from Arm, Left Updated:  04/03/17 2202     Extra Tube Hold for add-ons.      Auto resulted.       Lavender Top [[id number]] Collected:  04/03/17 1729    Specimen:  Blood from Arm, Left Updated:  04/03/17 2202     Extra Tube hold for add-on      Auto resulted       Gold Top - SST [[id number]] Collected:  04/03/17 1729    Specimen:  Blood from Arm, Left Updated:  04/03/17 2202     Extra Tube Hold for add-ons.      Auto resulted.       Extra Tubes [[id number]] Collected:  04/03/17 1741    Specimen:  Blood from Blood, Venous Line Updated:  04/03/17 2202    Narrative:       The following orders were created for panel order Extra Tubes.  Procedure                               Abnormality         Status                     ---------                               -----------         ------                     Green Top (Gel)[[id number]]                                   Final result                 Please view results for  these tests on the individual orders.    Green Top (Gel) [[id number]] Collected:  04/03/17 1741    Specimen:  Blood Updated:  04/03/17 2202     Extra Tube Hold for add-ons.      Auto resulted.       Basic Metabolic Panel [[id number]]  (Abnormal) Collected:  04/03/17 2358    Specimen:  Blood Updated:  04/04/17 0039     Glucose 225 (H) mg/dL      BUN 14 mg/dL      Creatinine 1.13 (H) mg/dL      Sodium 135 (L) mmol/L      Potassium 2.9 (L) mmol/L      Chloride 100 mmol/L      CO2 24.0 mmol/L      Calcium 8.3 (L) mg/dL      eGFR Non African Amer 50 (L) mL/min/1.73      BUN/Creatinine Ratio 12.4     Anion Gap 11.0 mmol/L     Troponin [[id number]]  (Normal) Collected:  04/03/17 2358    Specimen:  Blood Updated:  04/04/17 0041     Troponin I <0.012 ng/mL         Imaging Results (last 24 hours)     Procedure Component Value Units Date/Time    XR Chest 2 View [[id number]] Collected:  04/03/17 1753     Updated:  04/03/17 1756    Narrative:       Patient Name:  MRS. CHIQUITA BAILEY  Patient ID:  [id number]   Ordering:  RUDDY ACOSTA  Attending:  RUDDY ACOSTA  Referring:  RUDDY ACOSTA  ------------------------------------------------  Chest PA and lateral  CLINICAL INDICATION: Shortness of breath. Cough    COMPARISON: March 26, 2017.    FINDINGS: Cardiac silhouette is normal in size. Pulmonary  vascularity is unremarkable.   Midline sternal sutures from prior cardiac surgery. Chronic  elevation right diaphragm. Subtle fibrotic changes right midlung  field.  No focal infiltrate or consolidation.  No pleural effusion.  No  pneumothorax.      Impression:       CONCLUSION: No evidence of active disease.    Electronically signed by:  Naveed Taylor MD  4/3/2017 5:55 PM CDT  Workstation: [id number]            Assessment:  1) Orthostatic hypotension  2) Acute diarrhea  3) Hypokalemia  4) Coronary artery disease   5) Cystitis   6) Type II diabetes mellitus        Plan:  - Will give the patient IVF, monitor blood pressure  - Will check stool  studies and C. Diff toxin. If c diff negative, can start Imodium for symptom relief. Supportive care with IVF  - Will give PO KCL BID for electrolyte replacement  - Will start patient on Levaquin for suspected cystitis on UA, will check urine culture  - Continue current home medications  - Will add insulin sliding scale for glycemic control  - SCDs/TEDs for DVT PPx         Felipe Farrell MD  04/04/17  1:44 AM

## 2017-04-05 ENCOUNTER — HOSPITAL ENCOUNTER (OUTPATIENT)
Dept: INTERVENTIONAL RADIOLOGY/VASCULAR | Facility: HOSPITAL | Age: 57
Discharge: HOME | End: 2017-04-05

## 2017-04-05 ENCOUNTER — APPOINTMENT (OUTPATIENT)
Dept: ULTRASOUND IMAGING | Facility: HOSPITAL | Age: 57
End: 2017-04-05

## 2017-04-05 ENCOUNTER — APPOINTMENT (OUTPATIENT)
Dept: GENERAL RADIOLOGY | Facility: HOSPITAL | Age: 57
End: 2017-04-05

## 2017-04-05 LAB
ALBUMIN SERPL-MCNC: 2.8 G/DL (ref 3.4–4.8)
ALBUMIN/GLOB SERPL: 1.1 G/DL (ref 1.1–1.8)
ALP SERPL-CCNC: 60 U/L (ref 38–126)
ALT SERPL W P-5'-P-CCNC: 24 U/L (ref 9–52)
ANION GAP SERPL CALCULATED.3IONS-SCNC: 10 MMOL/L (ref 5–15)
AST SERPL-CCNC: 16 U/L (ref 14–36)
BASOPHILS # BLD AUTO: 0.03 10*3/MM3 (ref 0–0.2)
BASOPHILS NFR BLD AUTO: 0.3 % (ref 0–2)
BILIRUB SERPL-MCNC: 0.3 MG/DL (ref 0.2–1.3)
BUN BLD-MCNC: 13 MG/DL (ref 7–21)
BUN/CREAT SERPL: 12.4 (ref 7–25)
CALCIUM SPEC-SCNC: 7.8 MG/DL (ref 8.4–10.2)
CHLORIDE SERPL-SCNC: 103 MMOL/L (ref 95–110)
CK MB SERPL-CCNC: 1.27 NG/ML (ref 0–5)
CK SERPL-CCNC: 59 U/L (ref 30–135)
CO2 SERPL-SCNC: 25 MMOL/L (ref 22–31)
CREAT BLD-MCNC: 1.05 MG/DL (ref 0.5–1)
DEPRECATED RDW RBC AUTO: 40.8 FL (ref 36.4–46.3)
EOSINOPHIL # BLD AUTO: 0.25 10*3/MM3 (ref 0–0.7)
EOSINOPHIL NFR BLD AUTO: 2.8 % (ref 0–7)
ERYTHROCYTE [DISTWIDTH] IN BLOOD BY AUTOMATED COUNT: 13.3 % (ref 11.5–14.5)
GFR SERPL CREATININE-BSD FRML MDRD: 54 ML/MIN/1.73 (ref 60–120)
GLOBULIN UR ELPH-MCNC: 2.5 GM/DL (ref 2.3–3.5)
GLUCOSE BLD-MCNC: 236 MG/DL (ref 60–100)
GLUCOSE BLD-MCNC: 40 MG/DL (ref 60–100)
GLUCOSE BLDC GLUCOMTR-MCNC: 119 MG/DL (ref 70–130)
GLUCOSE BLDC GLUCOMTR-MCNC: 220 MG/DL (ref 70–130)
GLUCOSE BLDC GLUCOMTR-MCNC: 269 MG/DL (ref 70–130)
GLUCOSE BLDC GLUCOMTR-MCNC: 355 MG/DL (ref 70–130)
HBA1C MFR BLD: 11.43 % (ref 4–5.6)
HCT VFR BLD AUTO: 33.6 % (ref 35–45)
HGB BLD-MCNC: 11.3 G/DL (ref 12–15.5)
IMM GRANULOCYTES # BLD: 0.12 10*3/MM3 (ref 0–0.02)
IMM GRANULOCYTES NFR BLD: 1.3 % (ref 0–0.5)
LYMPHOCYTES # BLD AUTO: 2.55 10*3/MM3 (ref 0.6–4.2)
LYMPHOCYTES NFR BLD AUTO: 28.4 % (ref 10–50)
MCH RBC QN AUTO: 28.7 PG (ref 26.5–34)
MCHC RBC AUTO-ENTMCNC: 33.6 G/DL (ref 31.4–36)
MCV RBC AUTO: 85.3 FL (ref 80–98)
MONOCYTES # BLD AUTO: 0.61 10*3/MM3 (ref 0–0.9)
MONOCYTES NFR BLD AUTO: 6.8 % (ref 0–12)
NEUTROPHILS # BLD AUTO: 5.42 10*3/MM3 (ref 2–8.6)
NEUTROPHILS NFR BLD AUTO: 60.4 % (ref 37–80)
PLATELET # BLD AUTO: 521 10*3/MM3 (ref 150–450)
PMV BLD AUTO: 9.2 FL (ref 8–12)
POTASSIUM BLD-SCNC: 3.5 MMOL/L (ref 3.5–5.1)
PROT SERPL-MCNC: 5.3 G/DL (ref 6.3–8.6)
RBC # BLD AUTO: 3.94 10*6/MM3 (ref 3.77–5.16)
SODIUM BLD-SCNC: 138 MMOL/L (ref 137–145)
TROPONIN I SERPL-MCNC: <0.012 NG/ML
TROPONIN I SERPL-MCNC: <0.012 NG/ML
WBC NRBC COR # BLD: 8.98 10*3/MM3 (ref 3.2–9.8)

## 2017-04-05 PROCEDURE — 93005 ELECTROCARDIOGRAM TRACING: CPT | Performed by: INTERNAL MEDICINE

## 2017-04-05 PROCEDURE — 82962 GLUCOSE BLOOD TEST: CPT

## 2017-04-05 PROCEDURE — 63710000001 INSULIN DETEMIR PER 5 UNITS: Performed by: FAMILY MEDICINE

## 2017-04-05 PROCEDURE — G8978 MOBILITY CURRENT STATUS: HCPCS

## 2017-04-05 PROCEDURE — C1751 CATH, INF, PER/CENT/MIDLINE: HCPCS

## 2017-04-05 PROCEDURE — 25010000002 LEVOFLOXACIN PER 250 MG: Performed by: FAMILY MEDICINE

## 2017-04-05 PROCEDURE — 97116 GAIT TRAINING THERAPY: CPT

## 2017-04-05 PROCEDURE — 02HV33Z INSERTION OF INFUSION DEVICE INTO SUPERIOR VENA CAVA, PERCUTANEOUS APPROACH: ICD-10-PCS | Performed by: RADIOLOGY

## 2017-04-05 PROCEDURE — 84484 ASSAY OF TROPONIN QUANT: CPT | Performed by: INTERNAL MEDICINE

## 2017-04-05 PROCEDURE — B548ZZA ULTRASONOGRAPHY OF SUPERIOR VENA CAVA, GUIDANCE: ICD-10-PCS | Performed by: RADIOLOGY

## 2017-04-05 PROCEDURE — 87040 BLOOD CULTURE FOR BACTERIA: CPT | Performed by: INTERNAL MEDICINE

## 2017-04-05 PROCEDURE — 97162 PT EVAL MOD COMPLEX 30 MIN: CPT

## 2017-04-05 PROCEDURE — 82947 ASSAY GLUCOSE BLOOD QUANT: CPT | Performed by: INTERNAL MEDICINE

## 2017-04-05 PROCEDURE — 80053 COMPREHEN METABOLIC PANEL: CPT | Performed by: INTERNAL MEDICINE

## 2017-04-05 PROCEDURE — 82550 ASSAY OF CK (CPK): CPT | Performed by: INTERNAL MEDICINE

## 2017-04-05 PROCEDURE — 63710000001 INSULIN ASPART PER 5 UNITS: Performed by: FAMILY MEDICINE

## 2017-04-05 PROCEDURE — 82553 CREATINE MB FRACTION: CPT | Performed by: INTERNAL MEDICINE

## 2017-04-05 PROCEDURE — 85025 COMPLETE CBC W/AUTO DIFF WBC: CPT | Performed by: FAMILY MEDICINE

## 2017-04-05 PROCEDURE — 83036 HEMOGLOBIN GLYCOSYLATED A1C: CPT | Performed by: INTERNAL MEDICINE

## 2017-04-05 PROCEDURE — 76937 US GUIDE VASCULAR ACCESS: CPT

## 2017-04-05 PROCEDURE — G8979 MOBILITY GOAL STATUS: HCPCS

## 2017-04-05 PROCEDURE — 93010 ELECTROCARDIOGRAM REPORT: CPT | Performed by: INTERNAL MEDICINE

## 2017-04-05 RX ADMIN — SODIUM CHLORIDE 125 ML/HR: 9 INJECTION, SOLUTION INTRAVENOUS at 22:24

## 2017-04-05 RX ADMIN — GABAPENTIN 600 MG: 300 CAPSULE ORAL at 21:15

## 2017-04-05 RX ADMIN — ASPIRIN 81 MG 81 MG: 81 TABLET ORAL at 09:03

## 2017-04-05 RX ADMIN — ONDANSETRON 8 MG: 4 TABLET, FILM COATED ORAL at 10:39

## 2017-04-05 RX ADMIN — POTASSIUM CHLORIDE 40 MEQ: 750 CAPSULE, EXTENDED RELEASE ORAL at 17:44

## 2017-04-05 RX ADMIN — MAGNESIUM OXIDE TAB 400 MG (241.3 MG ELEMENTAL MG) 400 MG: 400 (241.3 MG) TAB at 08:59

## 2017-04-05 RX ADMIN — PANTOPRAZOLE SODIUM 40 MG: 40 TABLET, DELAYED RELEASE ORAL at 08:59

## 2017-04-05 RX ADMIN — INSULIN ASPART 40 UNITS: 100 INJECTION, SOLUTION INTRAVENOUS; SUBCUTANEOUS at 12:07

## 2017-04-05 RX ADMIN — BENZONATATE 100 MG: 100 CAPSULE ORAL at 13:49

## 2017-04-05 RX ADMIN — GABAPENTIN 600 MG: 300 CAPSULE ORAL at 13:49

## 2017-04-05 RX ADMIN — NAPROXEN 250 MG: 250 TABLET ORAL at 17:44

## 2017-04-05 RX ADMIN — ACETAMINOPHEN 650 MG: 325 TABLET ORAL at 10:39

## 2017-04-05 RX ADMIN — NAPROXEN 250 MG: 250 TABLET ORAL at 07:50

## 2017-04-05 RX ADMIN — INSULIN ASPART 40 UNITS: 100 INJECTION, SOLUTION INTRAVENOUS; SUBCUTANEOUS at 07:50

## 2017-04-05 RX ADMIN — CLOPIDOGREL BISULFATE 75 MG: 75 TABLET ORAL at 08:59

## 2017-04-05 RX ADMIN — NITROGLYCERIN 0.4 MG: 0.4 TABLET SUBLINGUAL at 15:22

## 2017-04-05 RX ADMIN — INSULIN ASPART 12 UNITS: 100 INJECTION, SOLUTION INTRAVENOUS; SUBCUTANEOUS at 12:08

## 2017-04-05 RX ADMIN — ONDANSETRON 8 MG: 4 TABLET, FILM COATED ORAL at 21:15

## 2017-04-05 RX ADMIN — INSULIN DETEMIR 70 UNITS: 100 INJECTION, SOLUTION SUBCUTANEOUS at 21:17

## 2017-04-05 RX ADMIN — POTASSIUM CHLORIDE 40 MEQ: 750 CAPSULE, EXTENDED RELEASE ORAL at 07:50

## 2017-04-05 RX ADMIN — SODIUM CHLORIDE 125 ML/HR: 9 INJECTION, SOLUTION INTRAVENOUS at 06:25

## 2017-04-05 RX ADMIN — MIRTAZAPINE 45 MG: 15 TABLET, FILM COATED ORAL at 21:15

## 2017-04-05 RX ADMIN — MAGNESIUM OXIDE TAB 400 MG (241.3 MG ELEMENTAL MG) 400 MG: 400 (241.3 MG) TAB at 17:44

## 2017-04-05 RX ADMIN — CARVEDILOL 3.12 MG: 3.12 TABLET, FILM COATED ORAL at 15:20

## 2017-04-05 RX ADMIN — RANOLAZINE 1000 MG: 500 TABLET, FILM COATED, EXTENDED RELEASE ORAL at 17:44

## 2017-04-05 RX ADMIN — INSULIN ASPART 8 UNITS: 100 INJECTION, SOLUTION INTRAVENOUS; SUBCUTANEOUS at 07:51

## 2017-04-05 RX ADMIN — VENLAFAXINE HYDROCHLORIDE 150 MG: 75 CAPSULE, EXTENDED RELEASE ORAL at 07:50

## 2017-04-05 RX ADMIN — BENZONATATE 100 MG: 100 CAPSULE ORAL at 22:24

## 2017-04-05 RX ADMIN — ONDANSETRON 8 MG: 4 TABLET, FILM COATED ORAL at 03:31

## 2017-04-05 RX ADMIN — SODIUM CHLORIDE 125 ML/HR: 9 INJECTION, SOLUTION INTRAVENOUS at 14:58

## 2017-04-05 RX ADMIN — LEVOFLOXACIN 500 MG: 5 INJECTION, SOLUTION INTRAVENOUS at 02:38

## 2017-04-05 RX ADMIN — MIRTAZAPINE 45 MG: 15 TABLET, FILM COATED ORAL at 00:11

## 2017-04-05 RX ADMIN — RANOLAZINE 1000 MG: 500 TABLET, FILM COATED, EXTENDED RELEASE ORAL at 08:59

## 2017-04-05 RX ADMIN — HYDROCODONE BITARTRATE AND ACETAMINOPHEN 1 TABLET: 7.5; 325 TABLET ORAL at 15:06

## 2017-04-05 RX ADMIN — ARIPIPRAZOLE 15 MG: 15 TABLET ORAL at 08:59

## 2017-04-05 RX ADMIN — ATORVASTATIN CALCIUM 40 MG: 40 TABLET, FILM COATED ORAL at 17:44

## 2017-04-05 RX ADMIN — DEXTROSE MONOHYDRATE 25 G: 25 INJECTION, SOLUTION INTRAVENOUS at 16:02

## 2017-04-05 RX ADMIN — NITROGLYCERIN 0.4 MG: 0.4 TABLET SUBLINGUAL at 15:15

## 2017-04-05 NOTE — PLAN OF CARE
Problem: Patient Care Overview (Adult)  Goal: Plan of Care Review  Outcome: Ongoing (interventions implemented as appropriate)    04/05/17 1143   Coping/Psychosocial Response Interventions   Plan Of Care Reviewed With patient   Outcome Evaluation   Outcome Summary/Follow up Plan PT evaluation completed. Pt fatigues easily. ambulated in room 30 ft with CGA. Function limited primarily by decreased strength, balance, and tolerance for functional mobiltiy and activities. Pt will benefit from skilled PT to regain lost function. If pt discharged prior to goals being met. PT recommends home with assistance.         Problem: Inpatient Physical Therapy  Goal: Transfer Training Goal 1 LTG- PT  Outcome: Ongoing (interventions implemented as appropriate)    04/05/17 1143   Transfer Training PT LTG   Transfer Training PT LTG, Date Established 04/05/17   Transfer Training PT LTG, Time to Achieve by discharge   Transfer Training PT LTG, [place] Level independent       Goal: Gait Training Goal LTG- PT  Outcome: Ongoing (interventions implemented as appropriate)    04/05/17 1143   Gait Training PT LTG   Gait Training Goal PT LTG, Date Established 04/05/17   Gait Training Goal PT LTG, Time to Achieve by discharge   Gait Training Goal PT LTG, [place] Level independent   Gait Training Goal PT LTG, Distance to Achieve 150 ft   Gait Training Goal PT LTG, Additional Goal 300ft   Gait Training Goal PT LTG, Outcome goal ongoing       Goal: Stair Training Goal LTG- PT  Outcome: Ongoing (interventions implemented as appropriate)    04/05/17 1143   Stair Training PT LTG   Stair Training Goal PT LTG, Date Established 04/05/17   Stair Training Goal PT LTG, Time to Achieve by discharge   Stair Training Goal PT LTG, [place] Level conditional independence   Stair Training Goal PT LTG, Distance to Achieve 2 steps   Stair Training Goal PT LTG, Outcome goal ongoing       Goal: Strength Goal LTG- PT  Outcome: Ongoing (interventions  implemented as appropriate)    04/05/17 1143   Strength Goal PT LTG   Strength Goal PT LTG, Date Established 04/05/17   Strength Goal PT LTG, Time to Achieve by discharge   Strength Goal PT LTG, Measure to Achieve pt will perform 2 sets of 1 reps of AROM exercises   Strength Goal PT LTG, Functional Goal Pt will progress to standing exercises   Strength Goal PT LTG, Outcome goal ongoing       Goal: Patient Education Goal LTG- PT  Outcome: Ongoing (interventions implemented as appropriate)    04/05/17 1143   Patient Education PT LTG   Patient Education PT LTG, Date Established 04/05/17   Patient Education PT LTG, Time to Achieve by discharge   Patient Education PT LTG, Education Type written program;HEP;gait;transfers;home safety   Patient Education PT LTG Outcome goal ongoing

## 2017-04-05 NOTE — PROGRESS NOTES
[facility]  INPATIENT PROGRESS NOTE    Length of Stay: 1  Date of Admission: 4/3/2017  Primary Care Physician: Francisca Fong MD    Subjective   Chief Complaint:   Chief Complaint   Patient presents with   • Flu Symptoms       HPI:  HPI      Review of Systems   Constitutional: Negative for activity change, appetite change, chills, diaphoresis, fatigue, fever and unexpected weight change.   HENT: Negative.  Negative for congestion, dental problem, drooling, ear discharge, ear pain, facial swelling, hearing loss, mouth sores, nosebleeds, postnasal drip, rhinorrhea, sinus pressure, sneezing, tinnitus, trouble swallowing and voice change.    Eyes: Negative for photophobia and discharge.   Respiratory: Negative for apnea, cough, choking, shortness of breath, wheezing and stridor.    Cardiovascular: Negative for chest pain, palpitations and leg swelling.   Gastrointestinal: Negative for abdominal pain, anal bleeding, blood in stool, constipation, diarrhea, nausea, rectal pain and vomiting.   Endocrine: Negative for cold intolerance, heat intolerance, polydipsia, polyphagia and polyuria.   Genitourinary: Negative for dysuria, enuresis, frequency, hematuria and urgency.   Musculoskeletal: Negative for arthralgias, back pain, joint swelling, myalgias, neck pain and neck stiffness.   Skin: Negative for color change, pallor, rash and wound.   Allergic/Immunologic: Negative for food allergies and immunocompromised state.   Neurological: Negative for dizziness, tremors, seizures, syncope, facial asymmetry, speech difficulty, weakness, light-headedness, numbness and headaches.   Hematological: Negative for adenopathy.   Psychiatric/Behavioral: Negative for agitation, behavioral problems, confusion, hallucinations, sleep disturbance and suicidal ideas. The patient is not nervous/anxious.       All pertinent negatives and positives are as above. All other systems  have been reviewed and are negative unless otherwise stated.     Objective    Temp:  [96.9 °F (36.1 °C)-97.4 °F (36.3 °C)] 97.4 °F (36.3 °C)  Heart Rate:  [57-78] 63  Resp:  [18] 18  BP: ()/(42-65) 117/56  Physical Exam   Constitutional: She is oriented to person, place, and time. She appears well-developed and well-nourished.  Non-toxic appearance. She does not have a sickly appearance. She does not appear ill. No distress. She is not intubated.   HENT:   Head: Normocephalic and atraumatic.   Right Ear: External ear normal.   Left Ear: External ear normal.   Nose: Nose normal.   Mouth/Throat: Oropharynx is clear and moist. No oropharyngeal exudate.   Eyes: Conjunctivae and EOM are normal. Pupils are equal, round, and reactive to light. Right eye exhibits no discharge and no exudate. Left eye exhibits no discharge and no exudate. Right conjunctiva is not injected. Right conjunctiva has no hemorrhage. Left conjunctiva is not injected. Left conjunctiva has no hemorrhage. No scleral icterus.   Neck: Normal range of motion. Neck supple. No hepatojugular reflux and no JVD present. No tracheal tenderness, no spinous process tenderness and no muscular tenderness present. Carotid bruit is not present. No rigidity. No tracheal deviation, no edema, no erythema and normal range of motion present. No thyroid mass and no thyromegaly present.   Cardiovascular: Normal rate, regular rhythm, normal heart sounds and intact distal pulses.   No extrasystoles are present. Exam reveals no gallop and no friction rub.    No murmur heard.  Pulmonary/Chest: Effort normal and breath sounds normal. No stridor. She is not intubated. No respiratory distress. She has no decreased breath sounds. She has no wheezes. She has no rhonchi. She has no rales. She exhibits no tenderness.   Abdominal: Soft. Normal appearance and bowel sounds are normal. She exhibits no shifting dullness, no distension, no pulsatile liver, no fluid wave, no abdominal  bruit, no ascites, no pulsatile midline mass and no mass. There is no hepatosplenomegaly, splenomegaly or hepatomegaly. There is no tenderness. There is no rigidity, no rebound, no guarding, no CVA tenderness, no tenderness at McBurney's point and negative Coombs's sign. No hernia.   Genitourinary: Rectal exam shows guaiac negative stool.   Musculoskeletal: Normal range of motion. She exhibits no edema, tenderness or deformity.        Right shoulder: She exhibits no swelling.      Skin Integrity  -   She hasno right foot ulcer, non-callous right foot, no right foot warmth and no right foot blister. She has no left foot ulcer, non-callous left foot, no left foot warmth and no left foot blister..  Lymphadenopathy:     She has no cervical adenopathy.     She has no axillary adenopathy.   Neurological: She is alert and oriented to person, place, and time. She has normal strength and normal reflexes. She is not disoriented. She displays no atrophy, no tremor and normal reflexes. No cranial nerve deficit or sensory deficit. She exhibits normal muscle tone. She displays no seizure activity. Coordination and gait normal. She displays no Babinski's sign on the right side. She displays no Babinski's sign on the left side.   Skin: Skin is warm and dry. No abrasion, no bruising, no burn, no ecchymosis, no laceration, no lesion, no purpura and no rash noted. Rash is not macular, not papular, not maculopapular, not nodular, not pustular, not vesicular and not urticarial. She is not diaphoretic. No cyanosis or erythema. No pallor. Nails show no clubbing.   Psychiatric: She has a normal mood and affect. Her behavior is normal. Judgment and thought content normal. Her mood appears not anxious. Her affect is not angry, not blunt, not labile and not inappropriate. Her speech is not slurred. She is not agitated, not aggressive, not hyperactive, not slowed, not withdrawn and not combative. Thought content is not paranoid and not  delusional. Cognition and memory are not impaired. She does not express impulsivity or inappropriate judgment. She does not exhibit a depressed mood. She expresses no homicidal and no suicidal ideation. She expresses no suicidal plans and no homicidal plans. She is communicative. She exhibits normal recent memory and normal remote memory.           Results Review:  I have reviewed the labs, radiology results, and diagnostic studies.    Laboratory Data:   Lab Results (last 24 hours)     Procedure Component Value Units Date/Time    POC Glucose Fingerstick [[id number]]  (Normal) Collected:  04/04/17 1649    Specimen:  Blood Updated:  04/04/17 1710     Glucose 88 mg/dL       Meter: [id number]: [id number] SHAISTA MORA       POC Glucose Fingerstick [[id number]]  (Abnormal) Collected:  04/04/17 2054    Specimen:  Blood Updated:  04/04/17 2116     Glucose 201 (H) mg/dL       RN NotifiedMeter: [id number]: [id number] MARITZA HOFFMANN        CBC & Differential [[id number]] Collected:  04/05/17 0600    Specimen:  Blood Updated:  04/05/17 0628    Narrative:       The following orders were created for panel order CBC & Differential.  Procedure                               Abnormality         Status                     ---------                               -----------         ------                     CBC Auto Differential[[id number]]         Abnormal            Final result                 Please view results for these tests on the individual orders.    CBC Auto Differential [[id number]]  (Abnormal) Collected:  04/05/17 0600    Specimen:  Blood Updated:  04/05/17 0628     WBC 8.98 10*3/mm3      RBC 3.94 10*6/mm3      Hemoglobin 11.3 (L) g/dL      Hematocrit 33.6 (L) %      MCV 85.3 fL      MCH 28.7 pg      MCHC 33.6 g/dL      RDW 13.3 %      RDW-SD 40.8 fl      MPV 9.2 fL      Platelets 521 (H) 10*3/mm3      Neutrophil % 60.4 %      Lymphocyte % 28.4 %      Monocyte % 6.8 %      Eosinophil % 2.8 %      Basophil %  0.3 %      Immature Grans % 1.3 (H) %      Neutrophils, Absolute 5.42 10*3/mm3      Lymphocytes, Absolute 2.55 10*3/mm3      Monocytes, Absolute 0.61 10*3/mm3      Eosinophils, Absolute 0.25 10*3/mm3      Basophils, Absolute 0.03 10*3/mm3      Immature Grans, Absolute 0.12 (H) 10*3/mm3     Hemoglobin A1c [[id number]]  (Abnormal) Collected:  04/05/17 0600    Specimen:  Blood Updated:  04/05/17 0633     Hemoglobin A1C 11.43 (H) %     POC Glucose Fingerstick [[id number]]  (Abnormal) Collected:  04/05/17 0623    Specimen:  Blood Updated:  04/05/17 0634     Glucose 269 (H) mg/dL       RN NotifiedMeter: [id number]: [id number] WILLOW HESS       Comprehensive Metabolic Panel [[id number]]  (Abnormal) Collected:  04/05/17 0600    Specimen:  Blood Updated:  04/05/17 0641     Glucose 236 (H) mg/dL      BUN 13 mg/dL      Creatinine 1.05 (H) mg/dL      Sodium 138 mmol/L      Potassium 3.5 mmol/L      Chloride 103 mmol/L      CO2 25.0 mmol/L      Calcium 7.8 (L) mg/dL      Total Protein 5.3 (L) g/dL      Albumin 2.80 (L) g/dL      ALT (SGPT) 24 U/L      AST (SGOT) 16 U/L      Alkaline Phosphatase 60 U/L      Total Bilirubin 0.3 mg/dL      eGFR Non African Amer 54 (L) mL/min/1.73      Globulin 2.5 gm/dL      A/G Ratio 1.1 g/dL      BUN/Creatinine Ratio 12.4     Anion Gap 10.0 mmol/L     POC Glucose Fingerstick [[id number]]  (Abnormal) Collected:  04/05/17 1014    Specimen:  Blood Updated:  04/05/17 1034     Glucose 355 (H) mg/dL       RN NotifiedMeter: [id number]: [id number] HUA ODONNELL             Culture Data:   No results found for: BLOODCX  Urine Culture   Date Value Ref Range Status   04/03/2017 Mixed Culture  Final     No results found for: RESPCX  No results found for: WOUNDCX  No results found for: STOOLCX  No components found for: BODYFLD    Radiology Data:   Imaging Results (last 24 hours)     Procedure Component Value Units Date/Time    XR Chest Post CVA Port [[id number]] Updated:  04/05/17 0830    IR  PICC wo fluoro guidance [[id number]] Resulted:  04/05/17 0837     Updated:  04/05/17 0837    Narrative:       This procedure was auto-finalized with no dictation required.    US Guided Vascular Access [[id number]] Resulted:  04/05/17 1242     Updated:  04/05/17 1242    Narrative:       This procedure was auto-finalized with no dictation required.          I have reviewed the patient current medications.     Assessment/Plan     Assessment:  1) Orthostatic hypotension  2) Acute diarrhea  3) Hypokalemia  4) Coronary artery disease   5) Cystitis   6) Type II diabetes mellitus, uncontrolled          Plan:  - Will give the patient IVF, monitor blood pressure.  - Will check stool studies and C. Diff toxin. If c diff negative, can start Imodium for symptom relief. Supportive care with IVF  - Will give PO KCL BID for electrolyte replacement  - Will start patient on Levaquin for suspected cystitis on UA, will check urine culture  - Continue current home medications  - Will add insulin sliding scale for glycemic control  - SCDs/TEDs for DVT PPx     Naomi Gardner MD   04/05/17   2:08 PM

## 2017-04-05 NOTE — PROGRESS NOTES
TWO PATIENT IDENTIFIERS WERE USED. CONSENT WAS SIGNED PER PATIENT EDUCATION MATERIAL WAS GIVEN TO PATIENT AND / OR FAMILY. THE PATIENT WAS DRAPED WITH FULL BODY DRAPE AND PATIENT'SRIGHT   ARM WAS PREPPED WITH CHLORAPREP.  ULTRASOUND WAS USED TO LOCALIZE THERIGHT BASILIC VEIN. SUBCUTANEOUS TISSUE AT THE CATHETER SITE WAS INFILTRATED WITH 2% LIDOCAINE. UNDER ULTRASOUND GUIDANCE, THE VEIN WAS ACCESSED WITH A 21GAUGE  NEEDLE. AN 0.018 WIRE WAS THEN THREADED THROUGH THE NEEDLE INTO THE CENTRAL VENOUS SYSTEM. THE 21GAUGE  NEEDLE WAS REMOVED AND A 6 FRENCHPEEL AWAY SHEATH WAS PLACED OVER THE WIRE. THE PICC LINE CATHETER WAS CUT AT 38 CM. THE PICC LINE CATHETER WAS THEN PLACED OVER THE WIRE INTO THE VEIN, THE SHEATH WAS PEELED AWAY,WIRE WAS REMOVED. CATHETER WAS FLUSHED WITH NORMAL SALINE AND TIPS APPLIED. BIOPATCH PLACED. CATHETER SECURED WITHSTATLOCK  AND TEGADERM. PATIENT TOLERATED PROCEDURE WELL. THIS WAS DONE IN   ANGIOSUITE      IMPRESSION: SUCCESSFUL PLACEMENT OF TRIPLE LUMEN PICC        Efrem Echeverria  4/5/2017  8:35 AM

## 2017-04-05 NOTE — PROGRESS NOTES
Discharge Planning Assessment  [facility]     Patient Name: Ariana Martinez  MRN: [id number]  Today's Date: 4/5/2017    Admit Date: 4/3/2017          Discharge Needs Assessment       04/05/17 1031    Living Environment    Provides Primary Care For no one    Quality Of Family Relationships involved;helpful;supportive    Able to Return to Prior Living Arrangements yes    Discharge Needs Assessment    Readmission Within The Last 30 Days no previous admission in last 30 days    Equipment Needed After Discharge none    Transportation Available car;family or friend will provide    Discharge Disposition home or self-care;home healthcare service    Discharge Planning Comments Pt rpeorts she is agreeable to HH but does not have a preference at this time. Pt reports she has heard of [facility] before. CM confirmed that insurance would cover [facility].             Discharge Plan       04/05/17 1035    Case Management/Social Work Plan    Plan Home with  services    Patient/Family In Agreement With Plan yes    Additional [facility]       04/05/17 0846    Case Management/Social Work Plan    Additional Comments Dehydrated requiring Bolus fluids , MD changed to inpatient.   Hypotention and Cystitis.        Discharge Placement     Facility/Agency Request Status Selected? Address Phone Number Fax Number    [facility]ending - No Request Sent     [address] [place] [id number] [phone]        Expected Discharge Date and Time     Expected Discharge Date Expected Discharge Time    Apr 5, 2017               Demographic Summary       04/05/17 1031    Referral Information    Admission Type inpatient    Arrived From home or self-care    Contact Information    Permission Granted to Share Information With ;primary care physician    Primary Care Physician Information    Name LISA SUTTON            Functional Status       04/05/17 1031     IADL    Medications independent    Meal Preparation independent    Housekeeping independent    Laundry independent    Shopping independent    Oral Care independent    Cognitive/Perceptual/Developmental    Current Mental Status/Cognitive Functioning no deficits noted    Recent Changes in Mental Status/Cognitive Functioning no changes            Psychosocial     None            Abuse/Neglect     None            Legal     None            Substance Abuse     None            Patient Forms     None          PARAMJIT Hurtado

## 2017-04-05 NOTE — PROGRESS NOTES
Acute Care - Physical Therapy Initial Evaluation  [facility]     Patient Name: Ariana Martinez  : 1960  MRN: [id number]  Today's Date: 2017      Date of Referral to PT: 17  Referring Physician: Dr. Naomi Gardner      Admit Date: 4/3/2017     Visit Dx:    ICD-10-CM ICD-9-CM   1. Atypical chest pain R07.89 786.59   2. Diarrhea, unspecified type R19.7 787.91   3. Orthostatic hypotension I95.1 458.0   4. Impaired physical mobility Z74.09 781.99     Patient Active Problem List   Diagnosis   • Type II diabetes mellitus, uncontrolled   • Closed nondisplaced fracture of fifth left metatarsal bone   • Type 2 diabetes mellitus with diabetic polyneuropathy   • MAURICIO (generalized anxiety disorder)   • Chronic cough   • Depression, major, recurrent, moderate   • Fluid retention   • GERD without esophagitis   • Obesity due to excess calories   • Long term prescription opiate use   • Chronic pain disorder   • Mixed hyperlipidemia   • Vitamin D deficiency   • Hypothyroidism due to Hashimoto's thyroiditis   • Precordial pain   • Atypical chest pain     Past Medical History:   Diagnosis Date   • Acquired hypothyroidism    • Angina, class IV    • Anxiety    • Benign paroxysmal positional vertigo    • Carpal tunnel syndrome    • Chronic pain    • Coronary atherosclerosis    • Depression    • Diabetes mellitus     Type 2, controlled   • Diabetic polyneuropathy    • Female stress incontinence    • Hashimoto's thyroiditis    • Hyperlipidemia    • Hypertension    • Low back pain    • Malaise and fatigue    • Multiple joint pain    • Obesity     Refuses to be weighed   • Otalgia     Both   • Perforation of tympanic membrane     Left   • Postoperative wound infection    • Vitamin D deficiency      Past Surgical History:   Procedure Laterality Date   • ABDOMINAL SURGERY     • ANGIOPLASTY      coronary   • CARDIAC CATHETERIZATION     • CARPAL TUNNEL RELEASE     • CHOLECYSTECTOMY     • CORONARY ARTERY BYPASS GRAFT     •  ENDOSCOPY AND COLONOSCOPY     • FOOT SURGERY      Toes   • GASTRIC BANDING      Revision, laparoscopic   • HYSTERECTOMY     • MOUTH SURGERY     • SALPINGO OOPHORECTOMY     • SHOULDER SURGERY     • TRANSESOPHAGEAL ECHOCARDIOGRAM (LAMONTE)      With color flow          PT ASSESSMENT (last 72 hours)      PT Evaluation       04/05/17 1031 04/05/17 0955    Rehab Evaluation    Document Type  evaluation  -AME    Subjective Information  agree to therapy;complains of;pain   in back and chest when she coughs  -AME    Patient Effort, Rehab Treatment  good  -AME    Symptoms Noted During/After Treatment  fatigue  -AME    General Information    Patient Profile Review  yes  -AME    Referring Physician  Dr. Naomi Gardner  -AME    General Observations  Pt supine;noted PIC line RUE  -AME    Pertinent History Of Current Problem  Pt admitted to [facility] 4/3/2017 with atypical chest pain;h/o recent dizziness, fall and the flu.  -AME    Precautions/Limitations  fall precautions;other (see comments)   vital signs  -AME    Prior Level of Function  independent:;all household mobility;community mobility;ADL's;home management;shopping;driving  -AME    Equipment Currently Used at Home  none   pt has rollator, BSC, bath chair  -AME    Plans/Goals Discussed With  patient  -AME    Benefits Reviewed  patient:  -AME    Barriers to Rehab  none identified  -AME    Living Environment    Lives With  spouse  -AME    Living Arrangements  house  -AME    Home Accessibility  stairs to enter home  -AME    Number of Stairs to Enter Home  2  -AME    Stair Railings at Home  outside, present at both sides  -AME    Transportation Available car;family or friend will provide  -KF     Living Environment Comment  Pt completed housekeeping tasks/shopping  -AME    Clinical Impression    Date of Referral to PT  04/04/17  -AME    PT Diagnosis  atypical chest pain, hypotenision cystitis  -AME    Prognosis  per MD  -AME    Patient/Family Goals Statement  Return to [facility]  -AME    Criteria for Skilled  Therapeutic Interventions Met  yes  -    Rehab Potential  good, to achieve stated therapy goals  -    Vital Signs    Pre Systolic BP Rehab  92  -    Pre Treatment Diastolic BP  52   reported to MD and RN during rounding  -    Intra Systolic BP Rehab  102  -    Intra Treatment Diastolic BP  54  -AME    Post Systolic BP Rehab  102  -    Post Treatment Diastolic BP  58  -    Pretreatment Heart Rate (beats/min)  63  -AME    Posttreatment Heart Rate (beats/min)  73  -AME    Pre SpO2 (%)  95  -AME    O2 Delivery Pre Treatment  room air  -    Post SpO2 (%)  97  -AME    O2 Delivery Post Treatment  room air  -AME    Pre Patient Position  Supine  -AME    Intra Patient Position  Sitting  -    Post Patient Position  Supine  -AME    Vision Assessment/Intervention    Visual Impairment  WFL  -    Cognitive Assessment/Intervention    Current Cognitive/Communication Assessment  functional  -    Orientation Status  oriented x 4  -    Follows Commands/Answers Questions  100% of the time;able to follow multi-step instructions  -    Personal Safety  mild impairment  -    Personal Safety Interventions  gait belt;fall prevention program maintained;nonskid shoes/slippers when out of bed;supervised activity  -    ROM (Range of Motion)    General ROM  no range of motion deficits identified  -    MMT (Manual Muscle Testing)    General MMT Assessment  upper extremity strength deficits identified;lower extremity strength deficits identified  -    Upper Extremity    Upper Ext Manual Muscle Testing Detail  BUE's: 4/5 , wrist, elbow, shoulder  -    Lower Extremity    Lower Ext Manual Muscle Testing Detail  BLE's: 4/5 ankle/foot, knee, hip  -    Bed Mobility, Assessment/Treatment    Bed Mobility, Assistive Device  head of bed elevated;bed rails  -    Bed Mobility, Roll Left, [place]  conditional independence  -    Bed Mobility, Scoot/Bridge, [place]  supervision required  -    Bed Mob, Supine to  Sit, [place]  contact guard assist  -AME    Bed Mob, Sit to Supine, [place]  supervision required  -AME    Transfer Assessment/Treatment    Transfers, Sit-Stand [place]  supervision required  -AME    Transfers, Stand-Sit [place]  supervision required  -AME    Gait Assessment/Treatment    Gait, [place] Level  contact guard assist  -AME    Gait, Distance (Feet)  30   15+15  -AME    Gait, Comment  Pt. easily fatigued and legs felt too weak to walk in hallway.  -AME    Sensory Assessment/Intervention    Light Touch  LUE;RUE;LLE;RLE  -AME    LUE Light Touch  WNL  -AME    RUE Light Touch  WNL  -AME    LLE Light Touch  WNL  -AME    RLE Light Touch  WNL  -AME    Edema Management    Edema Amount  right:;left:;trace  -AME    Positioning and Restraints    Pre-Treatment Position  in bed  -AME    Post Treatment Position  bed  -AME    In Bed  call light within reach;encouraged to call for assist;side rails up x2  -AME      04/04/17 0039 04/04/17 0035    General Information    Equipment Currently Used at Home  none  -RS    Living Environment    Lives With spouse  -RS     Living Arrangements house  -RS     Home Accessibility no concerns  -RS     Stair Railings at Home none  -RS     Type of Financial/Environmental Concern none  -RS     Transportation Available car  -RS       User Key  (r) = Recorded By, (t) = Taken By, (c) = Cosigned By    Initials Name Provider Type    AME Villaseñor, PT Physical Therapist    RS Conor Munguia, RN Registered Nurse    LYNETTE KelleyW           Physical Therapy Education     Title: PT OT SLP Therapies (Active)     Topic: Physical Therapy (Active)     Point: Mobility training (Active)    Learning Progress Summary    Learner Readiness Method Response Comment Documented by Status   Patient Acceptance E NR  AME 04/05/17 1141 Active               Point: Precautions (Active)    Learning Progress Summary    Learner Readiness Method Response Comment Documented by Status    Patient Acceptance E NR   04/05/17 1141 Active                      User Key     Initials Effective Dates Name Provider Type Discipline     10/17/16 -  Aure Villaseñor, PT Physical Therapist PT                PT Recommendation and Plan  Anticipated Discharge Disposition: home with assist  Planned Therapy Interventions: balance training, gait training, patient/family education, stair training, strengthening, transfer training  Plan of Care Review  Plan Of Care Reviewed With: patient  Outcome Summary/Follow up Plan: PT evaluation completed. Pt fatigues easily. ambulated in room 30 ft with CGA. Function limited primarily by decreased strength, balance, and tolerance for functional mobiltiy and activities. Pt will benefit from skilled PT to regain lost function. If pt discharged prior to goals being met. PT recommends home with assistance.          IP PT Goals       04/05/17 1143          Transfer Training PT LTG    Transfer Training PT LTG, Date Established 04/05/17  -      Transfer Training PT LTG, Time to Achieve by discharge  -      Transfer Training PT LTG, [place] Level independent  -      Gait Training PT LTG    Gait Training Goal PT LTG, Date Established 04/05/17  -      Gait Training Goal PT LTG, Time to Achieve by discharge  -      Gait Training Goal PT LTG, [place] Level independent  -      Gait Training Goal PT LTG, Distance to Achieve 150 ft  -      Gait Training Goal PT LTG, Additional Goal 300ft  -      Gait Training Goal PT LTG, Outcome goal ongoing  -      Stair Training PT LTG    Stair Training Goal PT LTG, Date Established 04/05/17  -      Stair Training Goal PT LTG, Time to Achieve by discharge  -      Stair Training Goal PT LTG, [place] Level conditional independence  -      Stair Training Goal PT LTG, Distance to Achieve 2 steps  -      Stair Training Goal PT LTG, Outcome goal ongoing  -      Strength Goal PT LTG    Strength Goal PT LTG, Date  Established 04/05/17  -      Strength Goal PT LTG, Time to Achieve by discharge  -      Strength Goal PT LTG, Measure to Achieve pt will perform 2 sets of 1 reps of AROM exercises  -      Strength Goal PT LTG, Functional Goal Pt will progress to standing exercises  -      Strength Goal PT LTG, Outcome goal ongoing  -      Patient Education PT LTG    Patient Education PT LTG, Date Established 04/05/17  -      Patient Education PT LTG, Time to Achieve by discharge  -      Patient Education PT LTG, Education Type written program;HEP;gait;transfers;home safety  -      Patient Education PT LTG Outcome goal ongoing  -        User Key  (r) = Recorded By, (t) = Taken By, (c) = Cosigned By    Initials Name Provider Type    AME Villaseñor PT Physical Therapist                Outcome Measures       04/05/17 0955          How much help from another person do you currently need...    Turning from your back to your side while in flat bed without using bedrails? 4  -AME      Moving from lying on back to sitting on the side of a flat bed without bedrails? 3  -AME      Moving to and from a bed to a chair (including a wheelchair)? 3  -AME      Standing up from a chair using your arms (e.g., wheelchair, bedside chair)? 3  -AME      Climbing 3-5 steps with a railing? 3  -AME      To walk in hospital room? 3  -AME      AM-PAC 6 Clicks Score 19  -      Functional Assessment    Outcome Measure Options AM-PAC 6 Clicks Basic Mobility (PT)  -        User Key  (r) = Recorded By, (t) = Taken By, (c) = Cosigned By    Initials Name Provider Type    AME Villaseñor PT Physical Therapist           Time Calculation:         PT Charges       04/05/17 0955          Time Calculation    Start Time 0955  -      Stop Time 1024  -      Time Calculation (min) 29 min  -      PT Received On 04/05/17  -      PT Goal Re-Cert Due Date 04/18/17  -      Time Calculation- PT    Total Timed Code Minutes- PT 14 minute(s)  -         User Key  (r) = Recorded By, (t) = Taken By, (c) = Cosigned By    Initials Name Provider Type    AME Aure Villaseñor, PT Physical Therapist          Therapy Charges for Today     Code Description Service Date Service Provider Modifiers Qty    99256007483 HC PT MOBILITY CURRENT 4/5/2017 Aure Villaseñor, PT GP,  1    80487375510 HC PT MOBILITY PROJECTED 4/5/2017 Aure Villaseñor, PT GP,  1    41655255264 HC PT EVAL MOD COMPLEXITY 1 4/5/2017 Aure Villaseñor, PT GP 1    40305077676 HC GAIT TRAINING EA 15 MIN 4/5/2017 Aure Villaseñor, PT GP 1          PT G-Codes  PT Professional Judgement Used?: Yes  Outcome Measure Options: AM-PAC 6 Clicks Basic Mobility (PT)  Score: 19  Functional Limitation: Mobility: Walking and moving around  Mobility: Walking and Moving Around Current Status (): At least 20 percent but less than 40 percent impaired, limited or restricted  Mobility: Walking and Moving Around Goal Status (): 0 percent impaired, limited or restricted      Aure Villaseñor PT  4/5/2017

## 2017-04-05 NOTE — PAYOR COMM NOTE
"Ariana Bailey (57 y.o. Female)     OIP 4/4/17. Pending ref # [id number].  Marcela Chow RN CM at [facility] .  Phone [phone], fax [phone]    Date of Birth Social Security Number Address Home Phone MRN    [id number]  [id number]  [facility] [address] [phone] [id number]    [employer] Marital Status          [facility]        Admission Date Admission Type Admitting Provider Attending Provider Department, Room/Bed    4/3/17 Emergency Felipe Farrell MD Shahidi, Felipe Khoury MD [facility] 3 [place], 330/1    Discharge Date Discharge Disposition Discharge Destination                      Attending Provider: Felipe Farrell MD     Allergies:  Seroquel [Quetiapine Fumarate], Avandia [Rosiglitazone], Morphine And Related    Isolation:  None   Infection:  None   Code Status:  FULL    Ht:  68\" (172.7 cm)   Wt:  256 lb 3.2 oz (116 kg)    Admission Cmt:  None   Principal Problem:  None                Active Insurance as of 4/3/2017     Primary Coverage     Payor Plan Insurance Group Employer/Plan Group    Critical access hospital BLUE [facility] [employer] EMPLOYEE [id number]     Payor Plan Address Payor Plan Phone Number Effective From Effective To    [address] [phone] 1/1/2015     [place] [id number]       Subscriber Name Subscriber Birth Date Member ID       ARIANA BAILEY 1960 [id number]                 Emergency Contacts      (Rel.) Home Phone Work Phone Mobile Phone    Nadeem Bailey (Spouse) -- -- [phone]               History & Physical      Felipe Farrell MD at 4/4/2017  1:44 AM                [facility] Medicine Admission      Date of Admission: 4/3/2017      Primary Care Physician: Francisca Fong MD      Chief Complaint: Dizziness, fall     HPI: Patient presents to ED with complaints of feeling dizzy and having a fall at home. Patient reports that she was standing up to " ambulate, when she felt dizzy upon standing. Afterwards, she did fall. No loss of consciousness after fall, did not hit her head. No blurry vision or aura symptoms before the fall. After the fall, no loss of bowel/bladder habits. For the past several days, complaining of having watery diarrhea. She is recently getting over a recent influenza infection. Today, she states she has some shortness of air at times with tightness in her chest occasionally which she attributes to her recent infection. No N/V reported. No diaphoresis reported.     Past Medical History:  has a past medical history of Acquired hypothyroidism; Angina, class IV; Anxiety; Benign paroxysmal positional vertigo; Carpal tunnel syndrome; Chronic pain; Coronary atherosclerosis; Depression; Diabetes mellitus; Diabetic polyneuropathy; Female stress incontinence; Hashimoto's thyroiditis; Hyperlipidemia; Hypertension; Low back pain; Malaise and fatigue; Multiple joint pain; Obesity; Otalgia; Perforation of tympanic membrane; Postoperative wound infection; and Vitamin D deficiency.    Past Surgical History:  has a past surgical history that includes Abdominal surgery; Angioplasty; Coronary artery bypass graft; Cardiac catheterization; Carpal tunnel release; Cholecystectomy; endoscopy and colonoscopy; Mouth surgery; transesophageal echocardiogram (mildred); Foot surgery; gastric banding; Hysterectomy; Shoulder surgery; and Salpingoophorectomy.    Family History: family history includes Diabetes in her other; Heart disease in her other; Hypertension in her other.    Social History:  reports that she has never smoked. She has never used smokeless tobacco. She reports that she does not drink alcohol or use illicit drugs.    Allergies:   Allergies   Allergen Reactions   • Seroquel [Quetiapine Fumarate] Anaphylaxis   • Avandia [Rosiglitazone] Swelling   • Morphine And Related Hallucinations     If patient takes to much       Medications: Scheduled Meds:  ARIPiprazole  15 mg Oral Daily   aspirin 81 mg Oral Daily   atorvastatin 40 mg Oral Q PM   carvedilol 3.125 mg Oral BID With Meals   clopidogrel 75 mg Oral Daily   docusate sodium 100 mg Oral BID   furosemide 40 mg Oral Daily   gabapentin 600 mg Oral Q8H   hydrochlorothiazide 12.5 mg Oral Daily   insulin aspart 0-14 Units Subcutaneous 4x Daily AC & at Bedtime   insulin aspart 40 Units Subcutaneous TID With Meals   insulin detemir 70 Units Subcutaneous Nightly   levoFLOXacin 500 mg Intravenous Q24H   lisinopril 2.5 mg Oral Daily   magnesium oxide 400 mg Oral BID   mirtazapine 45 mg Oral Nightly   naproxen 250 mg Oral BID With Meals   ondansetron 8 mg Oral Q8H   oseltamivir 75 mg Oral BID   pantoprazole 40 mg Oral Daily   potassium chloride 40 mEq Oral BID With Meals   ranolazine 1,000 mg Oral BID   sodium chloride 1,000 mL Intravenous Once   venlafaxine  mg Oral Daily With Breakfast   [START ON 4/9/2017] vitamin D 50,000 Units Oral Q7 Days     Continuous Infusions:  sodium chloride 125 mL/hr Last Rate: Stopped (04/03/17 2301)   sodium chloride 100 mL/hr Last Rate: 100 mL/hr (04/03/17 8780)     PRN Meds:.•  acetaminophen  •  dextrose  •  dextrose  •  glucagon (human recombinant)  •  HYDROcodone-acetaminophen  •  LORazepam  •  nitroglycerin  •  ondansetron  •  sodium chloride  •  sodium chloride  •  Insert peripheral IV **AND** sodium chloride    Review of Systems:  Review of Systems   Constitutional: Negative for appetite change, chills and fever.   HENT: Negative for congestion, ear pain, rhinorrhea, sneezing and sore throat.    Respiratory: Positive for shortness of breath. Negative for cough.    Cardiovascular: Positive for chest pain. Negative for palpitations and leg swelling.   Gastrointestinal: Positive for diarrhea. Negative for nausea and vomiting.   Endocrine: Negative for polyphagia and polyuria.   Musculoskeletal: Negative for back pain and neck pain.   Skin: Negative for color change and rash.   Neurological:  Positive for dizziness. Negative for syncope and weakness.   Psychiatric/Behavioral: Negative for agitation, confusion and dysphoric mood.      Otherwise complete ROS is negative except as mentioned above.    Physical Exam:   Temp:  [96.5 °F (35.8 °C)-98.4 °F (36.9 °C)] 96.5 °F (35.8 °C)  Heart Rate:  [74-95] 74  Resp:  [18] 18  BP: ()/(47-72) 90/70  Physical Exam   Constitutional: She is oriented to person, place, and time. She appears well-developed and well-nourished.   Cardiovascular: Normal rate, regular rhythm and normal heart sounds.  Exam reveals no gallop and no friction rub.    No murmur heard.  Pulmonary/Chest: Effort normal and breath sounds normal. No respiratory distress. She has no wheezes. She has no rales.   Abdominal: Soft. Bowel sounds are normal. There is no tenderness.   Musculoskeletal: Normal range of motion. She exhibits no edema.   Neurological: She is alert and oriented to person, place, and time.   Skin: Skin is warm and dry.   Psychiatric: She has a normal mood and affect. Her behavior is normal.   Vitals reviewed.        Results Reviewed:  I have personally reviewed current lab, radiology, and data and agree with results.  Lab Results (last 24 hours)     Procedure Component Value Units Date/Time    Comprehensive Metabolic Panel [[id number]]  (Abnormal) Collected:  04/03/17 1729    Specimen:  Blood from Arm, Left Updated:  04/03/17 1758     Glucose 83 mg/dL      BUN 14 mg/dL      Creatinine 1.14 (H) mg/dL      Sodium 142 mmol/L      Potassium 3.3 (L) mmol/L      Chloride 97 mmol/L      CO2 29.0 mmol/L      Calcium 9.8 mg/dL      Total Protein 7.8 g/dL      Albumin 4.40 g/dL      ALT (SGPT) 35 U/L      AST (SGOT) 95 (H) U/L      Alkaline Phosphatase 84 U/L      Total Bilirubin 0.6 mg/dL      eGFR Non African Amer 49 (L) mL/min/1.73      Globulin 3.4 gm/dL      A/G Ratio 1.3 g/dL      BUN/Creatinine Ratio 12.3     Anion Gap 16.0 (H) mmol/L     CBC Auto Differential [[id number]]   (Abnormal) Collected:  04/03/17 1729    Specimen:  Blood from Arm, Left Updated:  04/03/17 1800     WBC 10.62 (H) 10*3/mm3      RBC 4.92 10*6/mm3      Hemoglobin 14.1 g/dL      Hematocrit 41.1 %      MCV 83.5 fL      MCH 28.7 pg      MCHC 34.3 g/dL      RDW 13.5 %      RDW-SD 40.7 fl      MPV 9.0 fL      Platelets 671 (H) 10*3/mm3      Neutrophil % 60.5 %      Lymphocyte % 27.8 %      Monocyte % 7.7 %      Eosinophil % 1.8 %      Basophil % 0.3 %      Immature Grans % 1.9 (H) %      Neutrophils, Absolute 6.43 10*3/mm3      Lymphocytes, Absolute 2.95 10*3/mm3      Monocytes, Absolute 0.82 10*3/mm3      Eosinophils, Absolute 0.19 10*3/mm3      Basophils, Absolute 0.03 10*3/mm3      Immature Grans, Absolute 0.20 (H) 10*3/mm3      nRBC 0.0 /100 WBC     CBC & Differential [[id number]] Collected:  04/03/17 1729    Specimen:  Blood Updated:  04/03/17 1834    Narrative:       The following orders were created for panel order CBC & Differential.  Procedure                               Abnormality         Status                     ---------                               -----------         ------                     Scan Slide[[id number]]                                        Final result               CBC Auto Differential[[id number]]         Abnormal            Final result                 Please view results for these tests on the individual orders.    Scan Slide [[id number]] Collected:  04/03/17 1729    Specimen:  Blood from Arm, Left Updated:  04/03/17 1834     RBC Morphology Normal     WBC Morphology Normal     Platelet Estimate Increased    Protime-INR [[id number]]  (Normal) Collected:  04/03/17 1729    Specimen:  Blood from Arm, Left Updated:  04/03/17 1846     Protime 12.6 Seconds      INR 0.95    Narrative:       Therapeutic range for most indications is 2.0-3.0 INR,  or 2.5-3.5 for mechanical heart valves.    aPTT [[id number]]  (Normal) Collected:  04/03/17 1729    Specimen:  Blood from Arm, Left Updated:  04/03/17 1846      PTT 23.3 seconds     Narrative:       The recommended Heparin therapeutic range is 68-97 seconds.    CK [[id number]]  (Normal) Collected:  04/03/17 1741    Specimen:  Blood Updated:  04/03/17 1846     Creatine Kinase 61 U/L     CK-MB [[id number]]  (Normal) Collected:  04/03/17 1741    Specimen:  Blood Updated:  04/03/17 1856     CKMB 1.05 ng/mL     Troponin [[id number]]  (Normal) Collected:  04/03/17 1741    Specimen:  Blood Updated:  04/03/17 2034     Troponin I <0.012 ng/mL     Urine Culture [[id number]] Collected:  04/03/17 2016    Specimen:  Urine from Urine, Clean Catch Updated:  04/03/17 2042    Urinalysis With / Culture If Indicated [[id number]]  (Abnormal) Collected:  04/03/17 2016    Specimen:  Urine from Urine, Clean Catch Updated:  04/03/17 2050     Color, UA Dark Yellow     Appearance, UA Turbid (A)     pH, UA <=5.0     Specific Gravity, UA 1.021     Glucose,  mg/dL (1+) (A)     Ketones, UA Trace (A)     Bilirubin, UA Moderate (2+) (A)     Blood, UA Negative     Protein, UA Trace (A)     Leuk Esterase, UA Small (1+) (A)     Nitrite, UA Negative     Urobilinogen, UA 1.0 E.U./dL    Troponin [[id number]]  (Normal) Collected:  04/03/17 2031    Specimen:  Blood Updated:  04/03/17 2112     Troponin I <0.012 ng/mL     Urinalysis, Microscopic Only [[id number]]  (Abnormal) Collected:  04/03/17 2016    Specimen:  Urine from Urine, Clean Catch Updated:  04/03/17 2133     RBC, UA 0-2 (A) /HPF      WBC, UA 6-12 (A) /HPF      Bacteria, UA 3+ (A) /HPF      Squamous Epithelial Cells, UA 21-30 (A) /HPF      Yeast, UA Small/1+ Budding Yeast /HPF      Hyaline Casts, UA 13-20 /LPF      Methodology Manual Light Microscopy    [place] Draw [[id number]] Collected:  04/03/17 1729    Specimen:  Blood Updated:  04/03/17 2202    Narrative:       The following orders were created for panel order [place] Draw.  Procedure                               Abnormality         Status                     ---------                                -----------         ------                     Light Blue Top[[id number]]                                    Final result               Green Top (Gel)[[id number]]                                   Final result               Lavender Top[[id number]]                                      Final result               Gold Top - SST[[id number]]                                    Final result                 Please view results for these tests on the individual orders.    Light Blue Top [[id number]] Collected:  04/03/17 1729    Specimen:  Blood from Arm, Left Updated:  04/03/17 2202     Extra Tube hold for add-on      Auto resulted       Green Top (Gel) [[id number]] Collected:  04/03/17 1729    Specimen:  Blood from Arm, Left Updated:  04/03/17 2202     Extra Tube Hold for add-ons.      Auto resulted.       Lavender Top [[id number]] Collected:  04/03/17 1729    Specimen:  Blood from Arm, Left Updated:  04/03/17 2202     Extra Tube hold for add-on      Auto resulted       Gold Top - SST [[id number]] Collected:  04/03/17 1729    Specimen:  Blood from Arm, Left Updated:  04/03/17 2202     Extra Tube Hold for add-ons.      Auto resulted.       Extra Tubes [[id number]] Collected:  04/03/17 1741    Specimen:  Blood from Blood, Venous Line Updated:  04/03/17 2202    Narrative:       The following orders were created for panel order Extra Tubes.  Procedure                               Abnormality         Status                     ---------                               -----------         ------                     Green Top (Gel)[[id number]]                                   Final result                 Please view results for these tests on the individual orders.    Green Top (Gel) [[id number]] Collected:  04/03/17 1741    Specimen:  Blood Updated:  04/03/17 2202     Extra Tube Hold for add-ons.      Auto resulted.       Basic Metabolic Panel [[id number]]  (Abnormal) Collected:  04/03/17 2358    Specimen:  Blood Updated:  04/04/17 0039     Glucose  225 (H) mg/dL      BUN 14 mg/dL      Creatinine 1.13 (H) mg/dL      Sodium 135 (L) mmol/L      Potassium 2.9 (L) mmol/L      Chloride 100 mmol/L      CO2 24.0 mmol/L      Calcium 8.3 (L) mg/dL      eGFR Non African Amer 50 (L) mL/min/1.73      BUN/Creatinine Ratio 12.4     Anion Gap 11.0 mmol/L     Troponin [[id number]]  (Normal) Collected:  04/03/17 9268    Specimen:  Blood Updated:  04/04/17 0041     Troponin I <0.012 ng/mL         Imaging Results (last 24 hours)     Procedure Component Value Units Date/Time    XR Chest 2 View [[id number]] Collected:  04/03/17 1753     Updated:  04/03/17 1756    Narrative:       Patient Name:  MRS. CHIQUITA BAILEY  Patient ID:  [id number]   Ordering:  RUDDY ACOSTA  Attending:  RUDDY ACOSTA  Referring:  RUDDY ACOSTA  ------------------------------------------------  Chest PA and lateral  CLINICAL INDICATION: Shortness of breath. Cough    COMPARISON: March 26, 2017.    FINDINGS: Cardiac silhouette is normal in size. Pulmonary  vascularity is unremarkable.   Midline sternal sutures from prior cardiac surgery. Chronic  elevation right diaphragm. Subtle fibrotic changes right midlung  field.  No focal infiltrate or consolidation.  No pleural effusion.  No  pneumothorax.      Impression:       CONCLUSION: No evidence of active disease.    Electronically signed by:  Naveed Taylor MD  4/3/2017 5:55 PM CDT  Workstation: [id number]            Assessment:  1) Orthostatic hypotension  2) Acute diarrhea  3) Hypokalemia  4) Coronary artery disease   5) Cystitis   6) Type II diabetes mellitus        Plan:  - Will give the patient IVF, monitor blood pressure  - Will check stool studies and C. Diff toxin. If c diff negative, can start Imodium for symptom relief. Supportive care with IVF  - Will give PO KCL BID for electrolyte replacement  - Will start patient on Levaquin for suspected cystitis on UA, will check urine culture  - Continue current home medications  - Will add insulin sliding scale  for glycemic control  - SCDs/TEDs for DVT PPx         Felipe Farrell MD  04/04/17  1:44 AM                   Electronically signed by Felipe Farrell MD at 4/4/2017  2:03 AM        Hospital Medications (active)       Dose Frequency Start End    acetaminophen (TYLENOL) tablet 650 mg 650 mg Every 4 Hours PRN 4/3/2017     Sig - Route: Take 2 tablets by mouth Every 4 (Four) Hours As Needed for Mild Pain (1-3). - Oral    ARIPiprazole (ABILIFY) tablet 15 mg 15 mg Daily 4/4/2017     Sig - Route: Take 1 tablet by mouth Daily. - Oral    aspirin chewable tablet 81 mg 81 mg Daily 4/4/2017     Sig - Route: Chew 1 tablet Daily. - Oral    atorvastatin (LIPITOR) tablet 40 mg 40 mg Every Evening 4/4/2017     Sig - Route: Take 1 tablet by mouth Every Evening. - Oral    benzonatate (TESSALON) capsule 100 mg 100 mg 3 Times Daily PRN 4/4/2017     Sig - Route: Take 1 capsule by mouth 3 (Three) Times a Day As Needed for Cough. - Oral    carvedilol (COREG) tablet 3.125 mg 3.125 mg 2 Times Daily With Meals 4/4/2017     Sig - Route: Take 1 tablet by mouth 2 (Two) Times a Day With Meals. - Oral    clopidogrel (PLAVIX) tablet 75 mg 75 mg Daily 4/4/2017     Sig - Route: Take 1 tablet by mouth Daily. - Oral    dextrose (D50W) solution 25 g 25 g Every 15 Minutes PRN 4/3/2017     Sig - Route: Infuse 50 mL into a venous catheter Every 15 (Fifteen) Minutes As Needed for Low Blood Sugar (Blood Sugar Less Than 70, Patient Has IV Access - Unresponsive, NPO or Unable To Safely Swallow). - Intravenous    dextrose (GLUTOSE) oral gel 15 g 15 g Every 15 Minutes PRN 4/3/2017     Sig - Route: Take 15 g by mouth Every 15 (Fifteen) Minutes As Needed for Low Blood Sugar (Blood Sugar Less Than 70, Patient Alert, Is Not NPO & Can Safely Swallow). - Oral    docusate sodium (COLACE) capsule 100 mg 100 mg 2 Times Daily 4/4/2017     Sig - Route: Take 1 capsule by mouth 2 (Two) Times a Day. - Oral    gabapentin (NEURONTIN) capsule 600 mg 600 mg Every 8 Hours  Scheduled 4/4/2017     Sig - Route: Take 2 capsules by mouth Every 8 (Eight) Hours. - Oral    glucagon (human recombinant) (GLUCAGEN DIAGNOSTIC) injection 1 mg 1 mg Every 15 Minutes PRN 4/3/2017     Sig - Route: Inject 1 mg under the skin Every 15 (Fifteen) Minutes As Needed (Blood Glucose Less Than 70 - Patient Without IV Access - Unresponsive, NPO or Unable To Safely Swallow). - Subcutaneous    HYDROcodone-acetaminophen (NORCO) 7.5-325 MG per tablet 1 tablet 1 tablet Every 4 Hours PRN 4/3/2017     Sig - Route: Take 1 tablet by mouth Every 4 (Four) Hours As Needed for Moderate Pain (4-6). - Oral    insulin aspart (novoLOG) injection 0-14 Units 0-14 Units 4 Times Daily Before Meals & Nightly 4/4/2017     Sig - Route: Inject 0-14 Units under the skin 4 (Four) Times a Day Before Meals & at Bedtime. - Subcutaneous    insulin aspart (novoLOG) injection 40 Units 40 Units 3 Times Daily With Meals 4/4/2017     Sig - Route: Inject 40 Units under the skin 3 (Three) Times a Day With Meals. - Subcutaneous    insulin detemir (LEVEMIR) injection 70 Units 70 Units Nightly 4/4/2017     Sig - Route: Inject 70 Units under the skin Every Night. - Subcutaneous    levoFLOXacin (LEVAQUIN) 500 mg/100 mL D5W (premix) (LEVAQUIN) 500 mg 500 mg Every 24 Hours 4/4/2017     Sig - Route: Infuse 100 mL into a venous catheter Daily. - Intravenous    lisinopril (PRINIVIL,ZESTRIL) tablet 2.5 mg 2.5 mg Daily 4/4/2017     Sig - Route: Take 1 tablet by mouth Daily. - Oral    LORazepam (ATIVAN) tablet 2 mg 2 mg 2 Times Daily PRN 4/3/2017     Sig - Route: Take 1 tablet by mouth 2 (Two) Times a Day As Needed for Anxiety. - Oral    magnesium oxide (MAGOX) tablet 400 mg 400 mg 2 Times Daily 4/4/2017     Sig - Route: Take 1 tablet by mouth 2 (Two) Times a Day. - Oral    mirtazapine (REMERON) tablet 45 mg 45 mg Nightly 4/4/2017     Sig - Route: Take 3 tablets by mouth Every Night. - Oral    naproxen (NAPROSYN) tablet 250 mg 250 mg 2 Times Daily With Meals  "4/4/2017     Sig - Route: Take 1 tablet by mouth 2 (Two) Times a Day With Meals. - Oral    nitroglycerin (NITROSTAT) SL tablet 0.4 mg 0.4 mg Every 5 Minutes PRN 4/3/2017     Sig - Route: Place 1 tablet under the tongue Every 5 (Five) Minutes As Needed for Chest Pain. - Sublingual    ondansetron (ZOFRAN) injection 4 mg 4 mg Every 6 Hours PRN 4/3/2017     Sig - Route: Infuse 2 mL into a venous catheter Every 6 (Six) Hours As Needed for Nausea or Vomiting. - Intravenous    ondansetron (ZOFRAN) tablet 8 mg 8 mg Every 8 Hours 4/4/2017     Sig - Route: Take 2 tablets by mouth Every 8 (Eight) Hours. - Oral    pantoprazole (PROTONIX) EC tablet 40 mg 40 mg Daily 4/4/2017     Sig - Route: Take 1 tablet by mouth Daily. - Oral    potassium chloride (MICRO-K) CR capsule 40 mEq 40 mEq 2 Times Daily With Meals 4/4/2017     Sig - Route: Take 4 capsules by mouth 2 (Two) Times a Day With Meals. - Oral    ranolazine (RANEXA) 12 hr tablet 1,000 mg 1,000 mg 2 Times Daily 4/4/2017     Sig - Route: Take 2 tablets by mouth 2 (Two) Times a Day. - Oral    sodium chloride 0.9 % bolus 1,000 mL 1,000 mL Once 4/3/2017     Sig - Route: Infuse 1,000 mL into a venous catheter 1 (One) Time. - Intravenous    sodium chloride 0.9 % bolus 1,000 mL 1,000 mL Once 4/4/2017 4/5/2017    Sig - Route: Infuse 1,000 mL into a venous catheter 1 (One) Time. - Intravenous    sodium chloride 0.9 % flush 1-10 mL 1-10 mL As Needed 4/3/2017     Sig - Route: Infuse 1-10 mL into a venous catheter As Needed for Line Care. - Intravenous    sodium chloride 0.9 % flush 1-10 mL 1-10 mL As Needed 4/3/2017     Sig - Route: Infuse 1-10 mL into a venous catheter As Needed for Line Care. - Intravenous    sodium chloride 0.9 % flush 10 mL 10 mL As Needed 4/3/2017     Sig - Route: Infuse 10 mL into a venous catheter As Needed for Line Care. - Intravenous    Linked Group 1:  \"And\" Linked Group Details        sodium chloride 0.9 % infusion 125 mL/hr Continuous 4/3/2017     Sig - " Route: Infuse 125 mL/hr into a venous catheter Continuous. - Intravenous    venlafaxine XR (EFFEXOR-XR) 24 hr capsule 150 mg 150 mg Daily With Breakfast 4/4/2017     Sig - Route: Take 2 capsules by mouth Daily With Breakfast. - Oral    vitamin D (ERGOCALCIFEROL) capsule 50,000 Units 50,000 Units Every 7 Days 4/9/2017     Sig - Route: Take 1 capsule by mouth Every 7 (Seven) Days. - Oral    furosemide (LASIX) tablet 40 mg (Discontinued) 40 mg Daily 4/4/2017 4/4/2017    Sig - Route: Take 1 tablet by mouth Daily. - Oral    hydrochlorothiazide (MICROZIDE) capsule 12.5 mg (Discontinued) 12.5 mg Daily 4/4/2017 4/4/2017    Sig - Route: Take 1 capsule by mouth Daily. - Oral    oseltamivir (TAMIFLU) capsule 75 mg (Discontinued) 75 mg 2 Times Daily 4/4/2017 4/4/2017    Sig - Route: Take 1 capsule by mouth 2 (Two) Times a Day. - Oral    sodium chloride 0.9 % infusion (Discontinued) 100 mL/hr Continuous 4/4/2017 4/4/2017    Sig - Route: Infuse 100 mL/hr into a venous catheter Continuous. - Intravenous             Physician Progress Notes (last 24 hours) (Notes from 4/4/2017  9:34 AM through 4/5/2017  9:34 AM)      Naomi Gardner MD at 4/4/2017  4:02 PM  Version 1 of 1             [facility] Medicine Services  INPATIENT PROGRESS NOTE    Length of Stay: 0  Date of Admission: 4/3/2017  Primary Care Physician: Francisca Fong MD    Subjective   Chief Complaint:   Chief Complaint   Patient presents with   • Flu Symptoms         HPI      Review of Systems   Constitutional: Negative for activity change, appetite change, chills, diaphoresis, fatigue, fever and unexpected weight change.   HENT: Negative.  Negative for congestion, dental problem, drooling, ear discharge, ear pain, facial swelling, hearing loss, mouth sores, nosebleeds, postnasal drip, rhinorrhea, sinus pressure, sneezing, tinnitus, trouble swallowing and voice change.    Eyes: Negative for photophobia and discharge.    Respiratory: Negative for apnea, cough, choking, shortness of breath, wheezing and stridor.    Cardiovascular: Negative for chest pain, palpitations and leg swelling.   Gastrointestinal: Negative for abdominal pain, anal bleeding, blood in stool, constipation, diarrhea, nausea, rectal pain and vomiting.   Endocrine: Negative for cold intolerance, heat intolerance, polydipsia, polyphagia and polyuria.   Genitourinary: Negative for dysuria, enuresis, frequency, hematuria and urgency.   Musculoskeletal: Negative for arthralgias, back pain, joint swelling, myalgias, neck pain and neck stiffness.   Skin: Negative for color change, pallor, rash and wound.   Allergic/Immunologic: Negative for food allergies and immunocompromised state.   Neurological: Negative for dizziness, tremors, seizures, syncope, facial asymmetry, speech difficulty, weakness, light-headedness, numbness and headaches.   Hematological: Negative for adenopathy.   Psychiatric/Behavioral: Negative for agitation, behavioral problems, confusion, hallucinations, sleep disturbance and suicidal ideas. The patient is not nervous/anxious.         All pertinent negatives and positives are as above. All other systems have been reviewed and are negative unless otherwise stated.     Objective    Temp:  [96.5 °F (35.8 °C)-98.4 °F (36.9 °C)] 97.1 °F (36.2 °C)  Heart Rate:  [64-95] 72  Resp:  [18] 18  BP: ()/(44-72) 93/54  Physical Exam   Constitutional: She is oriented to person, place, and time. She appears well-developed and well-nourished.  Non-toxic appearance. She does not have a sickly appearance. She does not appear ill. No distress. She is not intubated.   HENT:   Head: Normocephalic and atraumatic.   Right Ear: External ear normal.   Left Ear: External ear normal.   Nose: Nose normal.   Mouth/Throat: Oropharynx is clear and moist. No oropharyngeal exudate.   Eyes: Conjunctivae and EOM are normal. Pupils are equal, round, and reactive to light. Right  eye exhibits no discharge and no exudate. Left eye exhibits no discharge and no exudate. Right conjunctiva is not injected. Right conjunctiva has no hemorrhage. Left conjunctiva is not injected. Left conjunctiva has no hemorrhage. No scleral icterus.   Neck: Normal range of motion. Neck supple. No hepatojugular reflux and no JVD present. No tracheal tenderness, no spinous process tenderness and no muscular tenderness present. Carotid bruit is not present. No rigidity. No tracheal deviation, no edema, no erythema and normal range of motion present. No thyroid mass and no thyromegaly present.   Cardiovascular: Normal rate, regular rhythm, normal heart sounds and intact distal pulses.   No extrasystoles are present. Exam reveals no gallop and no friction rub.    No murmur heard.  Pulmonary/Chest: Effort normal and breath sounds normal. No stridor. She is not intubated. No respiratory distress. She has no decreased breath sounds. She has no wheezes. She has no rhonchi. She has no rales. She exhibits no tenderness.   Abdominal: Soft. Normal appearance and bowel sounds are normal. She exhibits no shifting dullness, no distension, no pulsatile liver, no fluid wave, no abdominal bruit, no ascites, no pulsatile midline mass and no mass. There is no hepatosplenomegaly, splenomegaly or hepatomegaly. There is no tenderness. There is no rigidity, no rebound, no guarding, no CVA tenderness, no tenderness at McBurney's point and negative Coombs's sign. No hernia.   Genitourinary: Rectal exam shows guaiac negative stool.   Musculoskeletal: Normal range of motion. She exhibits no edema, tenderness or deformity.        Right shoulder: She exhibits no swelling.      Skin Integrity  -   She hasno right foot ulcer, non-callous right foot, no right foot warmth and no right foot blister. She has no left foot ulcer, non-callous left foot, no left foot warmth and no left foot blister..  Lymphadenopathy:     She has no cervical adenopathy.      She has no axillary adenopathy.   Neurological: She is alert and oriented to person, place, and time. She has normal strength and normal reflexes. She is not disoriented. She displays no atrophy, no tremor and normal reflexes. No cranial nerve deficit or sensory deficit. She exhibits normal muscle tone. She displays no seizure activity. Coordination and gait normal. She displays no Babinski's sign on the right side. She displays no Babinski's sign on the left side.   Skin: Skin is warm and dry. No abrasion, no bruising, no burn, no ecchymosis, no laceration, no lesion, no purpura and no rash noted. Rash is not macular, not papular, not maculopapular, not nodular, not pustular, not vesicular and not urticarial. She is not diaphoretic. No cyanosis or erythema. No pallor. Nails show no clubbing.   Psychiatric: She has a normal mood and affect. Her behavior is normal. Judgment and thought content normal. Her mood appears not anxious. Her affect is not angry, not blunt, not labile and not inappropriate. Her speech is not slurred. She is not agitated, not aggressive, not hyperactive, not slowed, not withdrawn and not combative. Thought content is not paranoid and not delusional. Cognition and memory are not impaired. She does not express impulsivity or inappropriate judgment. She does not exhibit a depressed mood. She expresses no homicidal and no suicidal ideation. She expresses no suicidal plans and no homicidal plans. She is communicative. She exhibits normal recent memory and normal remote memory.           Results Review:  I have reviewed the labs, radiology results, and diagnostic studies.    Laboratory Data:   Lab Results (last 24 hours)     Procedure Component Value Units Date/Time    Comprehensive Metabolic Panel [[id number]]  (Abnormal) Collected:  04/03/17 1729    Specimen:  Blood from Arm, Left Updated:  04/03/17 1758     Glucose 83 mg/dL      BUN 14 mg/dL      Creatinine 1.14 (H) mg/dL      Sodium 142  mmol/L      Potassium 3.3 (L) mmol/L      Chloride 97 mmol/L      CO2 29.0 mmol/L      Calcium 9.8 mg/dL      Total Protein 7.8 g/dL      Albumin 4.40 g/dL      ALT (SGPT) 35 U/L      AST (SGOT) 95 (H) U/L      Alkaline Phosphatase 84 U/L      Total Bilirubin 0.6 mg/dL      eGFR Non African Amer 49 (L) mL/min/1.73      Globulin 3.4 gm/dL      A/G Ratio 1.3 g/dL      BUN/Creatinine Ratio 12.3     Anion Gap 16.0 (H) mmol/L     CBC Auto Differential [[id number]]  (Abnormal) Collected:  04/03/17 1729    Specimen:  Blood from Arm, Left Updated:  04/03/17 1800     WBC 10.62 (H) 10*3/mm3      RBC 4.92 10*6/mm3      Hemoglobin 14.1 g/dL      Hematocrit 41.1 %      MCV 83.5 fL      MCH 28.7 pg      MCHC 34.3 g/dL      RDW 13.5 %      RDW-SD 40.7 fl      MPV 9.0 fL      Platelets 671 (H) 10*3/mm3      Neutrophil % 60.5 %      Lymphocyte % 27.8 %      Monocyte % 7.7 %      Eosinophil % 1.8 %      Basophil % 0.3 %      Immature Grans % 1.9 (H) %      Neutrophils, Absolute 6.43 10*3/mm3      Lymphocytes, Absolute 2.95 10*3/mm3      Monocytes, Absolute 0.82 10*3/mm3      Eosinophils, Absolute 0.19 10*3/mm3      Basophils, Absolute 0.03 10*3/mm3      Immature Grans, Absolute 0.20 (H) 10*3/mm3      nRBC 0.0 /100 WBC     CBC & Differential [[id number]] Collected:  04/03/17 1729    Specimen:  Blood Updated:  04/03/17 1834    Narrative:       The following orders were created for panel order CBC & Differential.  Procedure                               Abnormality         Status                     ---------                               -----------         ------                     Scan Slide[[id number]]                                        Final result               CBC Auto Differential[[id number]]         Abnormal            Final result                 Please view results for these tests on the individual orders.    Scan Slide [[id number]] Collected:  04/03/17 1729    Specimen:  Blood from Arm, Left Updated:  04/03/17 1834     RBC  Morphology Normal     WBC Morphology Normal     Platelet Estimate Increased    Protime-INR [[id number]]  (Normal) Collected:  04/03/17 1729    Specimen:  Blood from Arm, Left Updated:  04/03/17 1846     Protime 12.6 Seconds      INR 0.95    Narrative:       Therapeutic range for most indications is 2.0-3.0 INR,  or 2.5-3.5 for mechanical heart valves.    aPTT [[id number]]  (Normal) Collected:  04/03/17 1729    Specimen:  Blood from Arm, Left Updated:  04/03/17 1846     PTT 23.3 seconds     Narrative:       The recommended Heparin therapeutic range is 68-97 seconds.    CK [[id number]]  (Normal) Collected:  04/03/17 1741    Specimen:  Blood Updated:  04/03/17 1846     Creatine Kinase 61 U/L     CK-MB [[id number]]  (Normal) Collected:  04/03/17 1741    Specimen:  Blood Updated:  04/03/17 1856     CKMB 1.05 ng/mL     Troponin [[id number]]  (Normal) Collected:  04/03/17 1741    Specimen:  Blood Updated:  04/03/17 2034     Troponin I <0.012 ng/mL     Urinalysis With / Culture If Indicated [[id number]]  (Abnormal) Collected:  04/03/17 2016    Specimen:  Urine from Urine, Clean Catch Updated:  04/03/17 2050     Color, UA Dark Yellow     Appearance, UA Turbid (A)     pH, UA <=5.0     Specific Gravity, UA 1.021     Glucose,  mg/dL (1+) (A)     Ketones, UA Trace (A)     Bilirubin, UA Moderate (2+) (A)     Blood, UA Negative     Protein, UA Trace (A)     Leuk Esterase, UA Small (1+) (A)     Nitrite, UA Negative     Urobilinogen, UA 1.0 E.U./dL    Troponin [[id number]]  (Normal) Collected:  04/03/17 2031    Specimen:  Blood Updated:  04/03/17 2112     Troponin I <0.012 ng/mL     Urinalysis, Microscopic Only [[id number]]  (Abnormal) Collected:  04/03/17 2016    Specimen:  Urine from Urine, Clean Catch Updated:  04/03/17 2133     RBC, UA 0-2 (A) /HPF      WBC, UA 6-12 (A) /HPF      Bacteria, UA 3+ (A) /HPF      Squamous Epithelial Cells, UA 21-30 (A) /HPF      Yeast, UA Small/1+ Budding Yeast /HPF      Hyaline Casts, UA 13-20 /LPF       Methodology Manual Light Microscopy    [place] Draw [[id number]] Collected:  04/03/17 1729    Specimen:  Blood Updated:  04/03/17 2202    Narrative:       The following orders were created for panel order [place] Draw.  Procedure                               Abnormality         Status                     ---------                               -----------         ------                     Light Blue Top[[id number]]                                    Final result               Green Top (Gel)[[id number]]                                   Final result               Lavender Top[[id number]]                                      Final result               Gold Top - SST[[id number]]                                    Final result                 Please view results for these tests on the individual orders.    Light Blue Top [[id number]] Collected:  04/03/17 1729    Specimen:  Blood from Arm, Left Updated:  04/03/17 2202     Extra Tube hold for add-on      Auto resulted       Green Top (Gel) [[id number]] Collected:  04/03/17 1729    Specimen:  Blood from Arm, Left Updated:  04/03/17 2202     Extra Tube Hold for add-ons.      Auto resulted.       Lavender Top [[id number]] Collected:  04/03/17 1729    Specimen:  Blood from Arm, Left Updated:  04/03/17 2202     Extra Tube hold for add-on      Auto resulted       Gold Top - SST [[id number]] Collected:  04/03/17 1729    Specimen:  Blood from Arm, Left Updated:  04/03/17 2202     Extra Tube Hold for add-ons.      Auto resulted.       Extra Tubes [[id number]] Collected:  04/03/17 1741    Specimen:  Blood from Blood, Venous Line Updated:  04/03/17 2202    Narrative:       The following orders were created for panel order Extra Tubes.  Procedure                               Abnormality         Status                     ---------                               -----------         ------                     Green Top (Gel)[[id number]]                                   Final result                  Please view results for these tests on the individual orders.    Green Top (Gel) [[id number]] Collected:  04/03/17 1741    Specimen:  Blood Updated:  04/03/17 2202     Extra Tube Hold for add-ons.      Auto resulted.       Basic Metabolic Panel [[id number]]  (Abnormal) Collected:  04/03/17 2358    Specimen:  Blood Updated:  04/04/17 0039     Glucose 225 (H) mg/dL      BUN 14 mg/dL      Creatinine 1.13 (H) mg/dL      Sodium 135 (L) mmol/L      Potassium 2.9 (L) mmol/L      Chloride 100 mmol/L      CO2 24.0 mmol/L      Calcium 8.3 (L) mg/dL      eGFR Non African Amer 50 (L) mL/min/1.73      BUN/Creatinine Ratio 12.4     Anion Gap 11.0 mmol/L     Troponin [[id number]]  (Normal) Collected:  04/03/17 2358    Specimen:  Blood Updated:  04/04/17 0041     Troponin I <0.012 ng/mL     Troponin [[id number]]  (Normal) Collected:  04/04/17 0504    Specimen:  Blood Updated:  04/04/17 0618     Troponin I <0.012 ng/mL     CBC & Differential [[id number]] Collected:  04/04/17 0504    Specimen:  Blood Updated:  04/04/17 0624    Narrative:       The following orders were created for panel order CBC & Differential.  Procedure                               Abnormality         Status                     ---------                               -----------         ------                     CBC Auto Differential[[id number]]         Abnormal            Final result                 Please view results for these tests on the individual orders.    CBC Auto Differential [[id number]]  (Abnormal) Collected:  04/04/17 0504    Specimen:  Blood Updated:  04/04/17 0624     WBC 8.24 10*3/mm3      RBC 4.05 10*6/mm3      Hemoglobin 11.7 (L) g/dL      Hematocrit 34.3 (L) %      MCV 84.7 fL      MCH 28.9 pg      MCHC 34.1 g/dL      RDW 13.3 %      RDW-SD 40.6 fl      MPV 9.3 fL      Platelets 503 (H) 10*3/mm3      Neutrophil % 52.5 %      Lymphocyte % 33.6 %      Monocyte % 8.9 %      Eosinophil % 2.5 %      Basophil % 0.2 %      Immature Grans % 2.3 (H) %       Neutrophils, Absolute 4.32 10*3/mm3      Lymphocytes, Absolute 2.77 10*3/mm3      Monocytes, Absolute 0.73 10*3/mm3      Eosinophils, Absolute 0.21 10*3/mm3      Basophils, Absolute 0.02 10*3/mm3      Immature Grans, Absolute 0.19 (H) 10*3/mm3     POC Glucose Fingerstick [[id number]]  (Abnormal) Collected:  04/04/17 0638    Specimen:  Blood Updated:  04/04/17 0652     Glucose 255 (H) mg/dL       RN NotifiedMeter: [id number]: [id number] WILLOW HESS       Troponin [[id number]]  (Normal) Collected:  04/04/17 0847    Specimen:  Blood Updated:  04/04/17 0938     Troponin I <0.012 ng/mL     POC Glucose Fingerstick [[id number]]  (Abnormal) Collected:  04/04/17 1002    Specimen:  Blood Updated:  04/04/17 1022     Glucose 187 (H) mg/dL       RN NotifiedMeter: [id number]: [id number] SHAISTA MORA       Troponin [[id number]]  (Normal) Collected:  04/04/17 1115    Specimen:  Blood Updated:  04/04/17 1213     Troponin I <0.012 ng/mL     Urine Culture [[id number]] Collected:  04/03/17 2016    Specimen:  Urine from Urine, Clean Catch Updated:  04/04/17 1250     Urine Culture Mixed Culture    Narrative:         Specimen contains mixed organisms of questionable pathogenicity which indicates contamination with commensal marcelino.  Further identification is unlikely to provide clinically useful information.  Suggest recollection.          Culture Data:   No results found for: BLOODCX  Urine Culture   Date Value Ref Range Status   04/03/2017 Mixed Culture  Final     No results found for: RESPCX  No results found for: WOUNDCX  No results found for: STOOLCX  No components found for: BODYFLD    Radiology Data:   Imaging Results (last 24 hours)     Procedure Component Value Units Date/Time    XR Chest 2 View [[id number]] Collected:  04/03/17 1753     Updated:  04/03/17 1756    Narrative:       Patient Name:  MRS. CHIQUITA BAILEY  Patient ID:  [id number]   Ordering:  RUDDY ACOSTA  Attending:  RUDDY ACOSTA  Referring:  RUDDY KHOURY  KARLA  ------------------------------------------------  Chest PA and lateral  CLINICAL INDICATION: Shortness of breath. Cough    COMPARISON: March 26, 2017.    FINDINGS: Cardiac silhouette is normal in size. Pulmonary  vascularity is unremarkable.   Midline sternal sutures from prior cardiac surgery. Chronic  elevation right diaphragm. Subtle fibrotic changes right midlung  field.  No focal infiltrate or consolidation.  No pleural effusion.  No  pneumothorax.      Impression:       CONCLUSION: No evidence of active disease.    Electronically signed by:  Naveed Taylor MD  4/3/2017 5:55 PM CDT  Workstation: [id number]          I have reviewed the patient current medications.     Assessment/Plan     Assessment:  1) Orthostatic hypotension  2) Acute diarrhea  3) Hypokalemia  4) Coronary artery disease   5) Cystitis   6) Type II diabetes mellitus        Plan:  - Will give the patient IVF, monitor blood pressure  - Will check stool studies and C. Diff toxin. If c diff negative, can start Imodium for symptom relief. Supportive care with IVF  - Will give PO KCL BID for electrolyte replacement  - Will start patient on Levaquin for suspected cystitis on UA, will check urine culture  - Continue current home medications  - Will add insulin sliding scale for glycemic control  - SCDs/TEDs for DVT PPx           Naomi Gardner MD   04/04/17   4:02 PM         Electronically signed by Naomi Gardner MD at 4/4/2017  4:05 PM        Consult Notes (last 24 hours) (Notes from 4/4/2017  9:34 AM through 4/5/2017  9:34 AM)     No notes of this type exist for this encounter.

## 2017-04-05 NOTE — PLAN OF CARE
Problem: Patient Care Overview (Adult)  Goal: Plan of Care Review  Outcome: Ongoing (interventions implemented as appropriate)    04/05/17 0342   Coping/Psychosocial Response Interventions   Plan Of Care Reviewed With patient   Patient Care Overview   Progress no change   Outcome Evaluation   Outcome Summary/Follow up Plan BP dec'd, given bolus, IV blew, bolus given slow, BP inc'd. pt would like PICC line.         Problem: Acute Coronary Syndrome (ACS) (Adult)  Goal: Signs and Symptoms of Listed Potential Problems Will be Absent or Manageable (Acute Coronary Syndrome)  Outcome: Ongoing (interventions implemented as appropriate)    Problem: Diabetes, Type 2 (Adult)  Goal: Signs and Symptoms of Listed Potential Problems Will be Absent or Manageable (Diabetes, Type 2)  Outcome: Ongoing (interventions implemented as appropriate)

## 2017-04-06 VITALS
OXYGEN SATURATION: 97 % | SYSTOLIC BLOOD PRESSURE: 98 MMHG | BODY MASS INDEX: 39.98 KG/M2 | HEIGHT: 68 IN | TEMPERATURE: 96.7 F | WEIGHT: 263.8 LBS | RESPIRATION RATE: 18 BRPM | DIASTOLIC BLOOD PRESSURE: 51 MMHG | HEART RATE: 75 BPM

## 2017-04-06 LAB
ANION GAP SERPL CALCULATED.3IONS-SCNC: 9 MMOL/L (ref 5–15)
BASOPHILS # BLD AUTO: 0.02 10*3/MM3 (ref 0–0.2)
BASOPHILS NFR BLD AUTO: 0.2 % (ref 0–2)
BUN BLD-MCNC: 12 MG/DL (ref 7–21)
BUN/CREAT SERPL: 14 (ref 7–25)
CALCIUM SPEC-SCNC: 8.4 MG/DL (ref 8.4–10.2)
CHLORIDE SERPL-SCNC: 107 MMOL/L (ref 95–110)
CO2 SERPL-SCNC: 24 MMOL/L (ref 22–31)
CREAT BLD-MCNC: 0.86 MG/DL (ref 0.5–1)
DEPRECATED RDW RBC AUTO: 41.4 FL (ref 36.4–46.3)
EOSINOPHIL # BLD AUTO: 0.29 10*3/MM3 (ref 0–0.7)
EOSINOPHIL NFR BLD AUTO: 2.4 % (ref 0–7)
ERYTHROCYTE [DISTWIDTH] IN BLOOD BY AUTOMATED COUNT: 13.4 % (ref 11.5–14.5)
GFR SERPL CREATININE-BSD FRML MDRD: 69 ML/MIN/1.73 (ref 51–120)
GLUCOSE BLD-MCNC: 213 MG/DL (ref 60–100)
GLUCOSE BLDC GLUCOMTR-MCNC: 205 MG/DL (ref 70–130)
GLUCOSE BLDC GLUCOMTR-MCNC: 252 MG/DL (ref 70–130)
GLUCOSE BLDC GLUCOMTR-MCNC: 255 MG/DL (ref 70–130)
GLUCOSE BLDC GLUCOMTR-MCNC: 39 MG/DL (ref 70–130)
GLUCOSE BLDC GLUCOMTR-MCNC: 49 MG/DL (ref 70–130)
HCT VFR BLD AUTO: 34.7 % (ref 35–45)
HGB BLD-MCNC: 11.7 G/DL (ref 12–15.5)
IMM GRANULOCYTES # BLD: 0.13 10*3/MM3 (ref 0–0.02)
IMM GRANULOCYTES NFR BLD: 1.1 % (ref 0–0.5)
LYMPHOCYTES # BLD AUTO: 2.6 10*3/MM3 (ref 0.6–4.2)
LYMPHOCYTES NFR BLD AUTO: 21.6 % (ref 10–50)
MCH RBC QN AUTO: 28.8 PG (ref 26.5–34)
MCHC RBC AUTO-ENTMCNC: 33.7 G/DL (ref 31.4–36)
MCV RBC AUTO: 85.5 FL (ref 80–98)
MONOCYTES # BLD AUTO: 0.81 10*3/MM3 (ref 0–0.9)
MONOCYTES NFR BLD AUTO: 6.7 % (ref 0–12)
NEUTROPHILS # BLD AUTO: 8.18 10*3/MM3 (ref 2–8.6)
NEUTROPHILS NFR BLD AUTO: 68 % (ref 37–80)
PLATELET # BLD AUTO: 517 10*3/MM3 (ref 150–450)
PMV BLD AUTO: 9.3 FL (ref 8–12)
POTASSIUM BLD-SCNC: 4.6 MMOL/L (ref 3.5–5.1)
RBC # BLD AUTO: 4.06 10*6/MM3 (ref 3.77–5.16)
SODIUM BLD-SCNC: 140 MMOL/L (ref 137–145)
TROPONIN I SERPL-MCNC: <0.012 NG/ML
WBC NRBC COR # BLD: 12.03 10*3/MM3 (ref 3.2–9.8)

## 2017-04-06 PROCEDURE — 97116 GAIT TRAINING THERAPY: CPT

## 2017-04-06 PROCEDURE — 82962 GLUCOSE BLOOD TEST: CPT

## 2017-04-06 PROCEDURE — 84484 ASSAY OF TROPONIN QUANT: CPT | Performed by: INTERNAL MEDICINE

## 2017-04-06 PROCEDURE — 25010000002 LEVOFLOXACIN PER 250 MG: Performed by: FAMILY MEDICINE

## 2017-04-06 PROCEDURE — 63710000001 INSULIN ASPART PER 5 UNITS: Performed by: FAMILY MEDICINE

## 2017-04-06 PROCEDURE — 80048 BASIC METABOLIC PNL TOTAL CA: CPT | Performed by: FAMILY MEDICINE

## 2017-04-06 PROCEDURE — 85025 COMPLETE CBC W/AUTO DIFF WBC: CPT | Performed by: FAMILY MEDICINE

## 2017-04-06 RX ADMIN — INSULIN ASPART 8 UNITS: 100 INJECTION, SOLUTION INTRAVENOUS; SUBCUTANEOUS at 12:01

## 2017-04-06 RX ADMIN — CLOPIDOGREL BISULFATE 75 MG: 75 TABLET ORAL at 09:23

## 2017-04-06 RX ADMIN — LEVOFLOXACIN 500 MG: 5 INJECTION, SOLUTION INTRAVENOUS at 02:29

## 2017-04-06 RX ADMIN — LISINOPRIL 2.5 MG: 2.5 TABLET ORAL at 09:31

## 2017-04-06 RX ADMIN — ARIPIPRAZOLE 15 MG: 15 TABLET ORAL at 09:23

## 2017-04-06 RX ADMIN — INSULIN ASPART 40 UNITS: 100 INJECTION, SOLUTION INTRAVENOUS; SUBCUTANEOUS at 12:01

## 2017-04-06 RX ADMIN — GABAPENTIN 600 MG: 300 CAPSULE ORAL at 05:39

## 2017-04-06 RX ADMIN — INSULIN ASPART 5 UNITS: 100 INJECTION, SOLUTION INTRAVENOUS; SUBCUTANEOUS at 07:38

## 2017-04-06 RX ADMIN — ASPIRIN 81 MG 81 MG: 81 TABLET ORAL at 09:23

## 2017-04-06 RX ADMIN — ONDANSETRON 8 MG: 4 TABLET, FILM COATED ORAL at 02:29

## 2017-04-06 RX ADMIN — VENLAFAXINE HYDROCHLORIDE 150 MG: 75 CAPSULE, EXTENDED RELEASE ORAL at 07:38

## 2017-04-06 RX ADMIN — MAGNESIUM OXIDE TAB 400 MG (241.3 MG ELEMENTAL MG) 400 MG: 400 (241.3 MG) TAB at 09:23

## 2017-04-06 RX ADMIN — PANTOPRAZOLE SODIUM 40 MG: 40 TABLET, DELAYED RELEASE ORAL at 09:23

## 2017-04-06 RX ADMIN — SODIUM CHLORIDE 125 ML/HR: 9 INJECTION, SOLUTION INTRAVENOUS at 09:24

## 2017-04-06 RX ADMIN — POTASSIUM CHLORIDE 40 MEQ: 750 CAPSULE, EXTENDED RELEASE ORAL at 07:38

## 2017-04-06 RX ADMIN — INSULIN ASPART 40 UNITS: 100 INJECTION, SOLUTION INTRAVENOUS; SUBCUTANEOUS at 09:22

## 2017-04-06 RX ADMIN — RANOLAZINE 1000 MG: 500 TABLET, FILM COATED, EXTENDED RELEASE ORAL at 09:23

## 2017-04-06 RX ADMIN — NAPROXEN 250 MG: 250 TABLET ORAL at 07:38

## 2017-04-06 NOTE — PLAN OF CARE
Problem: Inpatient Physical Therapy  Goal: Transfer Training Goal 1 LTG- PT  Outcome: Unable to achieve outcome(s) by discharge Date Met:  04/06/17 04/05/17 1143 04/06/17 1547   Transfer Training PT LTG   Transfer Training PT LTG, Date Established 04/05/17 --    Transfer Training PT LTG, Time to Achieve by discharge --    Transfer Training PT LTG, [place] Level independent --    Transfer Training PT LTG, Date Goal Reviewed --  04/06/17   Transfer Training PT LTG, Outcome --  goal not met   Transfer Training PT LTG, Reason Goal Not Met --  discharged from facility       Goal: Gait Training Goal LTG- PT  Outcome: Unable to achieve outcome(s) by discharge Date Met:  04/06/17 04/05/17 1143 04/06/17 1547   Gait Training PT LTG   Gait Training Goal PT LTG, Date Established 04/05/17 --    Gait Training Goal PT LTG, Time to Achieve by discharge --    Gait Training Goal PT LTG, [place] Level independent --    Gait Training Goal PT LTG, Distance to Achieve 150 ft --    Gait Training Goal PT LTG, Additional Goal 300ft --    Gait Training Goal PT LTG, Date Goal Reviewed --  04/06/17   Gait Training Goal PT LTG, Outcome --  goal not met   Gait Training Goal PT LTG, Reason Goal Not Met --  discharged from facility       Goal: Stair Training Goal LTG- PT  Outcome: Unable to achieve outcome(s) by discharge Date Met:  04/06/17 04/05/17 1143 04/06/17 1547   Stair Training PT LTG   Stair Training Goal PT LTG, Date Established 04/05/17 --    Stair Training Goal PT LTG, Time to Achieve by discharge --    Stair Training Goal PT LTG, [place] Level conditional independence --    Stair Training Goal PT LTG, Distance to Achieve 2 steps --    Stair Training Goal PT LTG, Date Goal Reviewed --  04/06/17   Stair Training Goal PT LTG, Outcome --  goal not met   Stair Training Goal PT LTG, Reason Goal Not Met --  discharged from facility       Goal: Strength Goal LTG- PT  Outcome: Unable to achieve outcome(s) by discharge  Date Met:  04/06/17 04/05/17 1143 04/06/17 1547   Strength Goal PT LTG   Strength Goal PT LTG, Date Established 04/05/17 --    Strength Goal PT LTG, Time to Achieve by discharge --    Strength Goal PT LTG, Measure to Achieve pt will perform 2 sets of 1 reps of AROM exercises --    Strength Goal PT LTG, Functional Goal Pt will progress to standing exercises --    Strength Goal PT LTG, Date Goal Reviewed --  04/06/17   Strength Goal PT LTG, Outcome --  goal not met   Strength Goal PT LTG, Reason Goal Not Met --  discharged from facility       Goal: Patient Education Goal LTG- PT  Outcome: Unable to achieve outcome(s) by discharge Date Met:  04/06/17 04/05/17 1143 04/06/17 1547   Patient Education PT LTG   Patient Education PT LTG, Date Established 04/05/17 --    Patient Education PT LTG, Time to Achieve by discharge --    Patient Education PT LTG, Education Type written program;HEP;gait;transfers;home safety --    Patient Education PT LTG, Date Goal Reviewed --  04/06/17   Patient Education PT LTG Outcome --  goal not met   Patient Education PT LTG, Reason Goal Not Met --  discharged from facility

## 2017-04-06 NOTE — PLAN OF CARE
Problem: Patient Care Overview (Adult)  Goal: Plan of Care Review  Outcome: Ongoing (interventions implemented as appropriate)    04/06/17 1115   Coping/Psychosocial Response Interventions   Plan Of Care Reviewed With patient   Patient Care Overview   Progress improving   Outcome Evaluation   Outcome Summary/Follow up Plan t/f's sba/cga of 1,amb 70' w/o aad and cga of 1 with occasional reaching for rails,declined ex as lunch arrived-would benefit from cont hh pt when d/c home       Goal: Discharge Needs Assessment  Outcome: Ongoing (interventions implemented as appropriate)    04/04/17 1625 04/05/17 0955 04/05/17 1031   Discharge Needs Assessment   Concerns To Be Addressed no discharge needs identified --  --    Readmission Within The Last 30 Days --  --  no previous admission in last 30 days   Equipment Needed After Discharge --  --  none   Discharge Disposition --  --  home or self-care;home healthcare service   Discharge Planning Comments --  --  Pt rpeorts she is agreeable to HH but does not have a preference at this time. Pt reports she has heard of [facility] before. CM confirmed that insurance would cover [facility].    Living Environment   Transportation Available --  --  car;family or friend will provide   Self-Care   Equipment Currently Used at Home --  none  (pt has rollator, BSC, bath chair) --          Problem: Inpatient Physical Therapy  Goal: Transfer Training Goal 1 LTG- PT  Outcome: Ongoing (interventions implemented as appropriate)    04/05/17 1143 04/06/17 1115   Transfer Training PT LTG   Transfer Training PT LTG, Date Established 04/05/17 --    Transfer Training PT LTG, Time to Achieve by discharge --    Transfer Training PT LTG, [place] Level independent --    Transfer Training PT LTG, Date Goal Reviewed --  04/06/17   Transfer Training PT LTG, Outcome --  goal not met   Transfer Training PT LTG, Reason Goal Not Met --  progress slower than expected       Goal: Gait Training Goal LTG-  PT  Outcome: Ongoing (interventions implemented as appropriate)    04/05/17 1143 04/06/17 1115   Gait Training PT LTG   Gait Training Goal PT LTG, Date Established 04/05/17 --    Gait Training Goal PT LTG, Time to Achieve by discharge --    Gait Training Goal PT LTG, [place] Level independent --    Gait Training Goal PT LTG, Distance to Achieve 150 ft --    Gait Training Goal PT LTG, Additional Goal 300ft --    Gait Training Goal PT LTG, Date Goal Reviewed --  04/06/17   Gait Training Goal PT LTG, Outcome --  goal not met   Gait Training Goal PT LTG, Reason Goal Not Met --  progress slower than expected       Goal: Stair Training Goal LTG- PT  Outcome: Ongoing (interventions implemented as appropriate)    04/05/17 1143 04/06/17 1115   Stair Training PT LTG   Stair Training Goal PT LTG, Date Established 04/05/17 --    Stair Training Goal PT LTG, Time to Achieve by discharge --    Stair Training Goal PT LTG, [place] Level conditional independence --    Stair Training Goal PT LTG, Distance to Achieve 2 steps --    Stair Training Goal PT LTG, Date Goal Reviewed --  04/06/17   Stair Training Goal PT LTG, Outcome --  goal not met   Stair Training Goal PT LTG, Reason Goal Not Met --  progress slower than expected       Goal: Strength Goal LTG- PT  Outcome: Ongoing (interventions implemented as appropriate)    04/05/17 1143 04/06/17 1115   Strength Goal PT LTG   Strength Goal PT LTG, Date Established 04/05/17 --    Strength Goal PT LTG, Time to Achieve by discharge --    Strength Goal PT LTG, Measure to Achieve pt will perform 2 sets of 1 reps of AROM exercises --    Strength Goal PT LTG, Functional Goal Pt will progress to standing exercises --    Strength Goal PT LTG, Date Goal Reviewed --  04/06/17   Strength Goal PT LTG, Outcome --  goal not met   Strength Goal PT LTG, Reason Goal Not Met --  progress slower than expected       Goal: Patient Education Goal LTG- PT  Outcome: Ongoing (interventions implemented  as appropriate)    04/05/17 1143 04/06/17 1115   Patient Education PT LTG   Patient Education PT LTG, Date Established 04/05/17 --    Patient Education PT LTG, Time to Achieve by discharge --    Patient Education PT LTG, Education Type written program;HEP;gait;transfers;home safety --    Patient Education PT LTG, Date Goal Reviewed --  04/06/17   Patient Education PT LTG Outcome --  goal not met   Patient Education PT LTG, Reason Goal Not Met --  progress slower than expected

## 2017-04-06 NOTE — PLAN OF CARE
Problem: Patient Care Overview (Adult)  Goal: Plan of Care Review  Outcome: Ongoing (interventions implemented as appropriate)  Pt rested well, denied cp or soa when awoken during the night.    04/06/17 0439   Coping/Psychosocial Response Interventions   Plan Of Care Reviewed With patient   Patient Care Overview   Progress no change         Problem: Acute Coronary Syndrome (ACS) (Adult)  Goal: Signs and Symptoms of Listed Potential Problems Will be Absent or Manageable (Acute Coronary Syndrome)  Outcome: Ongoing (interventions implemented as appropriate)    Problem: Diabetes, Type 2 (Adult)  Goal: Signs and Symptoms of Listed Potential Problems Will be Absent or Manageable (Diabetes, Type 2)  Outcome: Ongoing (interventions implemented as appropriate)

## 2017-04-06 NOTE — THERAPY DISCHARGE NOTE
Acute Care - Physical Therapy Discharge Summary  [facility]       Patient Name: Ariana Martinez  : 1960  MRN: [id number]    Today's Date: 2017       Date of Referral to PT: 17  Referring Physician: Dr. Naomi Gardner      Admit Date: 4/3/2017      PT Recommendation and Plan    Visit Dx:    ICD-10-CM ICD-9-CM   1. Atypical chest pain R07.89 786.59   2. Diarrhea, unspecified type R19.7 787.91   3. Orthostatic hypotension I95.1 458.0   4. Impaired physical mobility Z74.09 781.99   5. Impaired gait and mobility R26.89 781.2             Outcome Measures       17 1405 17 1115 17 0955    How much help from another person do you currently need...    Turning from your back to your side while in flat bed without using bedrails? 4  -LN 4  -LN 4  -AME    Moving from lying on back to sitting on the side of a flat bed without bedrails? 3  -LN 3  -LN 3  -AME    Moving to and from a bed to a chair (including a wheelchair)? 3  -LN 3  -LN 3  -AME    Standing up from a chair using your arms (e.g., wheelchair, bedside chair)? 3  -LN 3  -LN 3  -AME    Climbing 3-5 steps with a railing? 3  -LN 3  -LN 3  -AME    To walk in hospital room? 3  -LN 3  -LN 3  -AME    AM-PAC 6 Clicks Score 19  -LN 19  -LN 19  -AME    Functional Assessment    Outcome Measure Options AM-PAC 6 Clicks Basic Mobility (PT)  -LN AM-PAC 6 Clicks Basic Mobility (PT)  -LN AM-PAC 6 Clicks Basic Mobility (PT)  -AME      User Key  (r) = Recorded By, (t) = Taken By, (c) = Cosigned By    Initials Name Provider Type    AME Villaseoñr, PT Physical Therapist    LN Adry Birmingham, PTA Physical Therapy Assistant                PT Charges       17 1405 17 1115       Time Calculation    Start Time 1405  -LN 1115  -LN     Stop Time 1420  -LN 1130  -LN     Time Calculation (min) 15 min  -LN 15 min  -LN     PT Received On 17  -LN      Time Calculation- PT    Total Timed Code Minutes- PT 15 minute(s)  -LN 15 minute(s)  -LN       User  Key  (r) = Recorded By, (t) = Taken By, (c) = Cosigned By    Initials Name Provider Type    LN Adry Birmingham, PTA Physical Therapy Assistant                  IP PT Goals       04/06/17 1547 04/06/17 1428 04/06/17 1405    Transfer Training PT LTG    Transfer Training PT  LTG, Date Goal Reviewed 04/06/17  -CZ      Transfer Training PT LTG, Outcome goal not met  -CZ      Transfer Training PT LTG, Reason Goal Not Met discharged from facility  -CZ      Gait Training PT LTG    Gait Training Goal PT LTG, Date Goal Reviewed 04/06/17  -CZ 04/06/17  -LN     Gait Training Goal PT LTG, Outcome goal not met  -CZ goal not met  -LN     Gait Training Goal PT LTG, Reason Goal Not Met discharged from facility  -CZ progress slower than expected  -LN     Stair Training PT LTG    Stair Training Goal PT LTG, Date Goal Reviewed 04/06/17  -CZ  04/06/17  -LN    Stair Training Goal PT LTG, Outcome goal not met  -CZ  goal not met  -LN    Stair Training Goal PT LTG, Reason Goal Not Met discharged from facility  -CZ  progress slower than expected  -LN    Strength Goal PT LTG    Strength Goal PT LTG, Date Goal Reviewed 04/06/17  -CZ 04/06/17  -LN     Strength Goal PT LTG, Outcome goal not met  -CZ goal not met  -LN     Strength Goal PT LTG, Reason Goal Not Met discharged from facility  -CZ progress slower than expected  -LN     Patient Education PT LTG    Patient Education PT LTG, Date Goal Reviewed 04/06/17  -CZ  04/06/17  -LN    Patient Education PT LTG Outcome goal not met  -CZ  goal partially met  -LN    Patient Education PT LTG, Reason Goal Not Met discharged from facility  -CZ  progress slower than expected  -LN      04/06/17 1115 04/05/17 1143       Transfer Training PT LTG    Transfer Training PT LTG, Date Established  04/05/17  -AME     Transfer Training PT LTG, Time to Achieve  by discharge  -AME     Transfer Training PT LTG, [place] Level  independent  -AME     Transfer Training PT  LTG, Date Goal Reviewed 04/06/17  -LN      Transfer  Training PT LTG, Outcome goal not met  -LN      Transfer Training PT LTG, Reason Goal Not Met progress slower than expected  -LN      Gait Training PT LTG    Gait Training Goal PT LTG, Date Established  04/05/17  -     Gait Training Goal PT LTG, Time to Achieve  by discharge  -     Gait Training Goal PT LTG, [place] Level  independent  -     Gait Training Goal PT LTG, Distance to Achieve  150 ft  -     Gait Training Goal PT LTG, Additional Goal  300ft  -     Gait Training Goal PT LTG, Date Goal Reviewed 04/06/17  -LN      Gait Training Goal PT LTG, Outcome goal not met  -LN goal ongoing  -     Gait Training Goal PT LTG, Reason Goal Not Met progress slower than expected  -LN      Stair Training PT LTG    Stair Training Goal PT LTG, Date Established  04/05/17  -     Stair Training Goal PT LTG, Time to Achieve  by discharge  -     Stair Training Goal PT LTG, [place] Level  conditional independence  -     Stair Training Goal PT LTG, Distance to Achieve  2 steps  -     Stair Training Goal PT LTG, Date Goal Reviewed 04/06/17  -      Stair Training Goal PT LTG, Outcome goal not met  -LN goal ongoing  -     Stair Training Goal PT LTG, Reason Goal Not Met progress slower than expected  -LN      Strength Goal PT LTG    Strength Goal PT LTG, Date Established  04/05/17  -     Strength Goal PT LTG, Time to Achieve  by discharge  -     Strength Goal PT LTG, Measure to Achieve  pt will perform 2 sets of 1 reps of AROM exercises  -     Strength Goal PT LTG, Functional Goal  Pt will progress to standing exercises  -     Strength Goal PT LTG, Date Goal Reviewed 04/06/17  -LN      Strength Goal PT LTG, Outcome goal not met  -LN goal ongoing  -     Strength Goal PT LTG, Reason Goal Not Met progress slower than expected  -LN      Patient Education PT LTG    Patient Education PT LTG, Date Established  04/05/17  -     Patient Education PT LTG, Time to Achieve  by discharge  -     Patient  Education PT LTG, Education Type  written program;HEP;gait;transfers;home safety  -AME     Patient Education PT LTG, Date Goal Reviewed 04/06/17  -LN      Patient Education PT LTG Outcome goal not met  -LN goal ongoing  -AME     Patient Education PT LTG, Reason Goal Not Met progress slower than expected  -LN        User Key  (r) = Recorded By, (t) = Taken By, (c) = Cosigned By    Initials Name Provider Type    AME Villaseñor, PT Physical Therapist    LN Adry Birmingham, PTA Physical Therapy Assistant    CZ Carlito Montoya, PT Physical Therapist              PT Discharge Summary  Anticipated Discharge Disposition: home with assist  Reason for Discharge: Discharge from facility, Per MD order  Outcomes Achieved: Patient able to partially acheive established goals  Discharge Destination: Home with home health      Carlito Montoya, PT   4/6/2017

## 2017-04-06 NOTE — PLAN OF CARE
Problem: Patient Care Overview (Adult)  Goal: Plan of Care Review  Outcome: Ongoing (interventions implemented as appropriate)    04/06/17 1115 04/06/17 1405   Coping/Psychosocial Response Interventions   Plan Of Care Reviewed With --  patient;spouse   Patient Care Overview   Progress improving --    Outcome Evaluation   Outcome Summary/Follow up Plan --  amb 100' with rw and sba of 1,declined step training stating they were small steps and she would not have problems getting in the house-will d/c home with spouse and hh pt-1/5 goals met       Goal: Discharge Needs Assessment  Outcome: Ongoing (interventions implemented as appropriate)    04/04/17 1625 04/05/17 0955 04/05/17 1031   Discharge Needs Assessment   Concerns To Be Addressed no discharge needs identified --  --    Readmission Within The Last 30 Days --  --  no previous admission in last 30 days   Equipment Needed After Discharge --  --  none   Discharge Disposition --  --  home or self-care;home healthcare service   Discharge Planning Comments --  --  Pt rpeorts she is agreeable to HH but does not have a preference at this time. Pt reports she has heard of [facility] before. CM confirmed that insurance would cover [facility].    Current Health   Outpatient/Agency/Support Group Needs --  --  --    Living Environment   Transportation Available --  --  car;family or friend will provide   Self-Care   Equipment Currently Used at Home --  none  (pt has rollator, BSC, bath chair) --      04/06/17 1405   Discharge Needs Assessment   Concerns To Be Addressed --    Readmission Within The Last 30 Days --    Equipment Needed After Discharge --    Discharge Disposition --    Discharge Planning Comments --    Current Health   Outpatient/Agency/Support Group Needs homecare agency (specify level of care)  ( pt)   Living Environment   Transportation Available --    Self-Care   Equipment Currently Used at Home --          Problem: Inpatient Physical Therapy  Goal: Gait  Training Goal LTG- PT  Outcome: Ongoing (interventions implemented as appropriate)    04/05/17 1143 04/06/17 1428   Gait Training PT LTG   Gait Training Goal PT LTG, Date Established 04/05/17 --    Gait Training Goal PT LTG, Time to Achieve by discharge --    Gait Training Goal PT LTG, [place] Level independent --    Gait Training Goal PT LTG, Distance to Achieve 150 ft --    Gait Training Goal PT LTG, Additional Goal 300ft --    Gait Training Goal PT LTG, Date Goal Reviewed --  04/06/17   Gait Training Goal PT LTG, Outcome --  goal not met   Gait Training Goal PT LTG, Reason Goal Not Met --  progress slower than expected       Goal: Stair Training Goal LTG- PT  Outcome: Ongoing (interventions implemented as appropriate)    04/05/17 1143 04/06/17 1405   Stair Training PT LTG   Stair Training Goal PT LTG, Date Established 04/05/17 --    Stair Training Goal PT LTG, Time to Achieve by discharge --    Stair Training Goal PT LTG, [place] Level conditional independence --    Stair Training Goal PT LTG, Distance to Achieve 2 steps --    Stair Training Goal PT LTG, Date Goal Reviewed --  04/06/17   Stair Training Goal PT LTG, Outcome --  goal not met   Stair Training Goal PT LTG, Reason Goal Not Met --  progress slower than expected       Goal: Strength Goal LTG- PT  Outcome: Ongoing (interventions implemented as appropriate)    04/05/17 1143 04/06/17 1428   Strength Goal PT LTG   Strength Goal PT LTG, Date Established 04/05/17 --    Strength Goal PT LTG, Time to Achieve by discharge --    Strength Goal PT LTG, Measure to Achieve pt will perform 2 sets of 1 reps of AROM exercises --    Strength Goal PT LTG, Functional Goal Pt will progress to standing exercises --    Strength Goal PT LTG, Date Goal Reviewed --  04/06/17   Strength Goal PT LTG, Outcome --  goal not met   Strength Goal PT LTG, Reason Goal Not Met --  progress slower than expected       Goal: Patient Education Goal LTG- PT  Outcome: Ongoing  (interventions implemented as appropriate)    04/05/17 1143 04/06/17 1405   Patient Education PT LTG   Patient Education PT LTG, Date Established 04/05/17 --    Patient Education PT LTG, Time to Achieve by discharge --    Patient Education PT LTG, Education Type written program;HEP;gait;transfers;home safety --    Patient Education PT LTG, Date Goal Reviewed --  04/06/17   Patient Education PT LTG Outcome --  goal partially met   Patient Education PT LTG, Reason Goal Not Met --  progress slower than expected

## 2017-04-06 NOTE — PROGRESS NOTES
Acute Care - Physical Therapy Treatment Note  [facility]     Patient Name: Ariana Martinez  : 1960  MRN: [id number]  Today's Date: 2017     Date of Referral to PT: 17  Referring Physician: Dr. Naomi Gardner    Admit Date: 4/3/2017    Visit Dx:    ICD-10-CM ICD-9-CM   1. Atypical chest pain R07.89 786.59   2. Diarrhea, unspecified type R19.7 787.91   3. Orthostatic hypotension I95.1 458.0   4. Impaired physical mobility Z74.09 781.99   5. Impaired gait and mobility R26.89 781.2     Patient Active Problem List   Diagnosis   • Type II diabetes mellitus, uncontrolled   • Closed nondisplaced fracture of fifth left metatarsal bone   • Type 2 diabetes mellitus with diabetic polyneuropathy   • MAURICIO (generalized anxiety disorder)   • Chronic cough   • Depression, major, recurrent, moderate   • Fluid retention   • GERD without esophagitis   • Obesity due to excess calories   • Long term prescription opiate use   • Chronic pain disorder   • Mixed hyperlipidemia   • Vitamin D deficiency   • Hypothyroidism due to Hashimoto's thyroiditis   • Precordial pain   • Atypical chest pain               Adult Rehabilitation Note       17 1115          Rehab Assessment/Intervention    Discipline physical therapy assistant  -LN      Document Type therapy note (daily note)  -LN      Subjective Information agree to therapy;complains of;weakness;fatigue  -LN      Precautions/Limitations fall precautions;other (see comments)   vs  -LN      Recorded by [LN] Adry Birmingham, PTA      Vital Signs    Pre Systolic BP Rehab 100  -LN      Pre Treatment Diastolic BP 50  -LN      Intra Systolic BP Rehab 109  -LN      Intra Treatment Diastolic BP 55   standing  -LN      Post Systolic BP Rehab 131  -LN      Post Treatment Diastolic BP 66  -LN      Pretreatment Heart Rate (beats/min) 70  -LN      Intratreatment Heart Rate (beats/min) 79  -LN      Posttreatment Heart Rate (beats/min) 72  -LN      Pre SpO2 (%) 96  -LN      O2  Delivery Pre Treatment room air  -LN      Post SpO2 (%) 98  -LN      Pre Patient Position Supine  -LN      Intra Patient Position Standing  -LN      Post Patient Position Sitting  -LN      Recorded by [LN] Adry Birmingham PTA      Pain Assessment    Pain Assessment 0-10  -LN      Pain Score 0  -LN      Post Pain Score 0  -LN      Recorded by [LN] Adry Birmingham PTA      Cognitive Assessment/Intervention    Orientation Status oriented to;person     -LN      Recorded by [LN] Adry Birmingham PTA      Bed Mobility, Assessment/Treatment    Bed Mobility, Assistive Device bed rails;head of bed elevated  -LN      Bed Mob, Supine to Sit, [place] supervision required  -LN      Recorded by [LN] Adry Birmingham, MANJINDER      Transfer Assessment/Treatment    Transfers, Sit-Stand [place] supervision required  -LN      Transfers, Stand-Sit [place] supervision required  -LN      Recorded by [LN] Adry Birmingham, PTA      Gait Assessment/Treatment    Gait, [place] Level contact guard assist  -LN      Gait, Assistive Device --   none  -LN      Gait, Distance (Feet) 70  -LN      Gait, Comment fatigues quickly-occasional reaching for rails  -LN      Recorded by [LN] Adry Birmingham PTA      Positioning and Restraints    Post Treatment Position bed  -LN      In Bed sitting EOB;call light within reach;with family/caregiver  -LN      Recorded by [LN] Adry Birmingham PTA        User Key  (r) = Recorded By, (t) = Taken By, (c) = Cosigned By    Initials Name Effective Dates    LYDIA Birmingham PTA 10/17/16 -                 IP PT Goals       17 1115 17 1143       Transfer Training PT LTG    Transfer Training PT LTG, Date Established  17  -AME     Transfer Training PT LTG, Time to Achieve  by discharge  -AME     Transfer Training PT LTG, [place] Level  independent  -AME     Transfer Training PT  LTG, Date Goal Reviewed 17  -LN      Transfer Training PT LTG, Outcome goal not met  -LN      Transfer Training PT LTG,  Reason Goal Not Met progress slower than expected  -LN      Gait Training PT LTG    Gait Training Goal PT LTG, Date Established  04/05/17  -     Gait Training Goal PT LTG, Time to Achieve  by discharge  -     Gait Training Goal PT LTG, [place] Level  independent  -     Gait Training Goal PT LTG, Distance to Achieve  150 ft  -     Gait Training Goal PT LTG, Additional Goal  300ft  -     Gait Training Goal PT LTG, Date Goal Reviewed 04/06/17  -LN      Gait Training Goal PT LTG, Outcome goal not met  -LN goal ongoing  -     Gait Training Goal PT LTG, Reason Goal Not Met progress slower than expected  -LN      Stair Training PT LTG    Stair Training Goal PT LTG, Date Established  04/05/17  -     Stair Training Goal PT LTG, Time to Achieve  by discharge  -     Stair Training Goal PT LTG, [place] Level  conditional independence  -     Stair Training Goal PT LTG, Distance to Achieve  2 steps  -     Stair Training Goal PT LTG, Date Goal Reviewed 04/06/17  -LN      Stair Training Goal PT LTG, Outcome goal not met  -LN goal ongoing  -     Stair Training Goal PT LTG, Reason Goal Not Met progress slower than expected  -LN      Strength Goal PT LTG    Strength Goal PT LTG, Date Established  04/05/17  -     Strength Goal PT LTG, Time to Achieve  by discharge  -     Strength Goal PT LTG, Measure to Achieve  pt will perform 2 sets of 1 reps of AROM exercises  -     Strength Goal PT LTG, Functional Goal  Pt will progress to standing exercises  -     Strength Goal PT LTG, Date Goal Reviewed 04/06/17  -      Strength Goal PT LTG, Outcome goal not met  -LN goal ongoing  -     Strength Goal PT LTG, Reason Goal Not Met progress slower than expected  -LN      Patient Education PT LTG    Patient Education PT LTG, Date Established  04/05/17  -     Patient Education PT LTG, Time to Achieve  by discharge  -     Patient Education PT LTG, Education Type  written program;HEP;gait;transfers;home  safety  -AME     Patient Education PT LTG, Date Goal Reviewed 04/06/17  -LN      Patient Education PT LTG Outcome goal not met  -LN goal ongoing  -AME     Patient Education PT LTG, Reason Goal Not Met progress slower than expected  -LN        User Key  (r) = Recorded By, (t) = Taken By, (c) = Cosigned By    Initials Name Provider Type    AME Villaseñor, PT Physical Therapist    LN Adry Birmingham, MANJINDER Physical Therapy Assistant          Physical Therapy Education     Title: PT OT SLP Therapies (Active)     Topic: Physical Therapy (Active)     Point: Mobility training (Active)    Learning Progress Summary    Learner Readiness Method Response Comment Documented by Status   Patient Acceptance E NR  LN 04/06/17 1145 Active    Acceptance E NR  AME 04/05/17 1141 Active               Point: Precautions (Active)    Learning Progress Summary    Learner Readiness Method Response Comment Documented by Status   Patient Acceptance E NR  LN 04/06/17 1145 Active    Acceptance E NR  AME 04/05/17 1141 Active                      User Key     Initials Effective Dates Name Provider Type Discipline     10/17/16 -  Aure Villaseñor, PT Physical Therapist PT     10/17/16 -  Adry Birmingham PTA Physical Therapy Assistant PT                    PT Recommendation and Plan  Anticipated Discharge Disposition: home with assist  Planned Therapy Interventions: balance training, gait training, patient/family education, stair training, strengthening, transfer training  Plan of Care Review  Plan Of Care Reviewed With: patient  Progress: improving  Outcome Summary/Follow up Plan: t/f's sba/cga of 1,amb 70' w/o aad and cga of 1 with occasional reaching for rails,declined ex as lunch arrived-would benefit from cont hh pt when d/c home          Outcome Measures       04/06/17 1115 04/05/17 0955       How much help from another person do you currently need...    Turning from your back to your side while in flat bed without using bedrails? 4  -LN 4  -AME      Moving from lying on back to sitting on the side of a flat bed without bedrails? 3  -LN 3  -AME     Moving to and from a bed to a chair (including a wheelchair)? 3  -LN 3  -AME     Standing up from a chair using your arms (e.g., wheelchair, bedside chair)? 3  -LN 3  -AME     Climbing 3-5 steps with a railing? 3  -LN 3  -AME     To walk in hospital room? 3  -LN 3  -AME     AM-PAC 6 Clicks Score 19  -LN 19  -AME     Functional Assessment    Outcome Measure Options AM-PAC 6 Clicks Basic Mobility (PT)  -LN AM-PAC 6 Clicks Basic Mobility (PT)  -AME       User Key  (r) = Recorded By, (t) = Taken By, (c) = Cosigned By    Initials Name Provider Type    AME Villaseñor, PT Physical Therapist    LYDIA Birmingham PTA Physical Therapy Assistant           Time Calculation:         PT Charges       04/06/17 1115          Time Calculation    Start Time 1115  -LN      Stop Time 1130  -LN      Time Calculation (min) 15 min  -LN      Time Calculation- PT    Total Timed Code Minutes- PT 15 minute(s)  -LN        User Key  (r) = Recorded By, (t) = Taken By, (c) = Cosigned By    Initials Name Provider Type    LYDIA Birmingham PTA Physical Therapy Assistant          Therapy Charges for Today     Code Description Service Date Service Provider Modifiers Qty    15600121543 HC GAIT TRAINING EA 15 MIN 4/6/2017 Adry Birmingham PTA GP 1          PT G-Codes  PT Professional Judgement Used?: Yes  Outcome Measure Options: AM-PAC 6 Clicks Basic Mobility (PT)  Score: 19  Functional Limitation: Mobility: Walking and moving around  Mobility: Walking and Moving Around Current Status (): At least 20 percent but less than 40 percent impaired, limited or restricted  Mobility: Walking and Moving Around Goal Status (): 0 percent impaired, limited or restricted    Adry Birmingham PTA  4/6/2017

## 2017-04-06 NOTE — PROGRESS NOTES
Acute Care - Physical Therapy Treatment Note  [facility]     Patient Name: Ariana Martinez  : 1960  MRN: [id number]  Today's Date: 2017     Date of Referral to PT: 17  Referring Physician: Dr. Naomi Gardner    Admit Date: 4/3/2017    Visit Dx:    ICD-10-CM ICD-9-CM   1. Atypical chest pain R07.89 786.59   2. Diarrhea, unspecified type R19.7 787.91   3. Orthostatic hypotension I95.1 458.0   4. Impaired physical mobility Z74.09 781.99   5. Impaired gait and mobility R26.89 781.2     Patient Active Problem List   Diagnosis   • Type II diabetes mellitus, uncontrolled   • Closed nondisplaced fracture of fifth left metatarsal bone   • Type 2 diabetes mellitus with diabetic polyneuropathy   • MAURICIO (generalized anxiety disorder)   • Chronic cough   • Depression, major, recurrent, moderate   • Fluid retention   • GERD without esophagitis   • Obesity due to excess calories   • Long term prescription opiate use   • Chronic pain disorder   • Mixed hyperlipidemia   • Vitamin D deficiency   • Hypothyroidism due to Hashimoto's thyroiditis   • Precordial pain   • Atypical chest pain               Adult Rehabilitation Note       17 1405 17 1115       Rehab Assessment/Intervention    Discipline physical therapy assistant  -LN physical therapy assistant  -LN     Document Type therapy note (daily note)  -LN therapy note (daily note)  -LN     Subjective Information agree to therapy;no complaints  -LN agree to therapy;complains of;weakness;fatigue  -LN     Precautions/Limitations fall precautions;other (see comments)   vs  -LN fall precautions;other (see comments)   vs  -LN     Specific Treatment Considerations declined step training but with min encouragement agreed to gt with rw to see if if would help balance  -LN      Recorded by [LN] Adry S Vinnie, PTA [LN] Adry S Vinnie, PTA     Vital Signs    Pre Systolic BP Rehab  100  -LN     Pre Treatment Diastolic BP  50  -LN     Intra Systolic BP Rehab  109   -LN     Intra Treatment Diastolic BP  55   standing  -LN     Post Systolic BP Rehab  131  -LN     Post Treatment Diastolic BP  66  -LN     Pretreatment Heart Rate (beats/min)  70  -LN     Intratreatment Heart Rate (beats/min)  79  -LN     Posttreatment Heart Rate (beats/min)  72  -LN     Pre SpO2 (%)  96  -LN     O2 Delivery Pre Treatment  room air  -LN     Post SpO2 (%)  98  -LN     Pre Patient Position  Supine  -LN     Intra Patient Position  Standing  -LN     Post Patient Position  Sitting  -LN     Recorded by  [LN] Adry Birmingham PTA     Pain Assessment    Pain Assessment 0-10  -LN 0-10  -LN     Pain Score 0  -LN 0  -LN     Post Pain Score 0  -LN 0  -LN     Recorded by [LN] Adry Birmingham, MANJINDER [LN] Adry Birmingham PTA     Cognitive Assessment/Intervention    Orientation Status oriented to;place     -LN oriented to;person     -LN     Recorded by [LN] Adry Birmingham, PTA [LN] Adry Birmingham PTA     Bed Mobility, Assessment/Treatment    Bed Mobility, Assistive Device  bed rails;head of bed elevated  -LN     Bed Mob, Supine to Sit, [place]  supervision required  -LN     Recorded by  [LN] Adry Birmingham PTA     Transfer Assessment/Treatment    Transfers, Sit-Stand [place] supervision required  -LN supervision required  -LN     Transfers, Stand-Sit [place] supervision required  -LN supervision required  -LN     Recorded by [LN] Adry Birmingham, MANJINDER [LN] Adry Birmingham PTA     Gait Assessment/Treatment    Gait, [place] Level supervision required  -LN contact guard assist  -LN     Gait, Assistive Device rolling walker  -LN --   none  -LN     Gait, Distance (Feet) 100  -LN 70  -LN     Gait, Comment improved balance/endurance with gt  -LN fatigues quickly-occasional reaching for rails  -LN     Recorded by [LN] Adry Birmingham, MANJINDER [LN] Adry Birmingham PTA     Positioning and Restraints    Post Treatment Position bed  -LN bed  -LN     In Bed sitting EOB;call light within reach;patient within staff view  -LN sitting  EOB;call light within reach;with family/caregiver  -LN     Recorded by [LN] Adry S Vinnie, PTA [LN] Adry S Vinnie, PTA       User Key  (r) = Recorded By, (t) = Taken By, (c) = Cosigned By    Initials Name Effective Dates    LN Adry S Vinnie, PTA 10/17/16 -                 IP PT Goals       04/06/17 1428 04/06/17 1405 04/06/17 1115    Transfer Training PT LTG    Transfer Training PT  LTG, Date Goal Reviewed   04/06/17  -LN    Transfer Training PT LTG, Outcome   goal not met  -LN    Transfer Training PT LTG, Reason Goal Not Met   progress slower than expected  -LN    Gait Training PT LTG    Gait Training Goal PT LTG, Date Goal Reviewed 04/06/17  -LN  04/06/17  -LN    Gait Training Goal PT LTG, Outcome goal not met  -LN  goal not met  -LN    Gait Training Goal PT LTG, Reason Goal Not Met progress slower than expected  -LN  progress slower than expected  -LN    Stair Training PT LTG    Stair Training Goal PT LTG, Date Goal Reviewed  04/06/17  -LN 04/06/17  -LN    Stair Training Goal PT LTG, Outcome  goal not met  -LN goal not met  -LN    Stair Training Goal PT LTG, Reason Goal Not Met  progress slower than expected  -LN progress slower than expected  -LN    Strength Goal PT LTG    Strength Goal PT LTG, Date Goal Reviewed 04/06/17  -LN  04/06/17  -LN    Strength Goal PT LTG, Outcome goal not met  -LN  goal not met  -LN    Strength Goal PT LTG, Reason Goal Not Met progress slower than expected  -LN  progress slower than expected  -LN    Patient Education PT LTG    Patient Education PT LTG, Date Goal Reviewed  04/06/17  -LN 04/06/17  -LN    Patient Education PT LTG Outcome  goal partially met  -LN goal not met  -LN    Patient Education PT LTG, Reason Goal Not Met  progress slower than expected  -LN progress slower than expected  -LN      04/05/17 1143          Transfer Training PT LTG    Transfer Training PT LTG, Date Established 04/05/17  -AME      Transfer Training PT LTG, Time to Achieve by discharge  -AME      Transfer  Training PT LTG, [place] Level independent  -      Gait Training PT LTG    Gait Training Goal PT LTG, Date Established 04/05/17  -      Gait Training Goal PT LTG, Time to Achieve by discharge  -      Gait Training Goal PT LTG, [place] Level independent  -      Gait Training Goal PT LTG, Distance to Achieve 150 ft  -      Gait Training Goal PT LTG, Additional Goal 300ft  -      Gait Training Goal PT LTG, Outcome goal ongoing  -      Stair Training PT LTG    Stair Training Goal PT LTG, Date Established 04/05/17  -      Stair Training Goal PT LTG, Time to Achieve by discharge  -      Stair Training Goal PT LTG, [place] Level conditional independence  -      Stair Training Goal PT LTG, Distance to Achieve 2 steps  -      Stair Training Goal PT LTG, Outcome goal ongoing  [facility]      Strength Goal PT LTG    Strength Goal PT LTG, Date Established 04/05/17  -      Strength Goal PT LTG, Time to Achieve by discharge  -      Strength Goal PT LTG, Measure to Achieve pt will perform 2 sets of 1 reps of AROM exercises  -      Strength Goal PT LTG, Functional Goal Pt will progress to standing exercises  -      Strength Goal PT LTG, Outcome goal ongoing  [facility]      Patient Education PT LTG    Patient Education PT LTG, Date Established 04/05/17  -      Patient Education PT LTG, Time to Achieve by discharge  -      Patient Education PT LTG, Education Type written program;HEP;gait;transfers;home safety  -      Patient Education PT LTG Outcome goal [facility]        User Key  (r) = Recorded By, (t) = Taken By, (c) = Cosigned By    Initials Name Provider Type    AME Villaseñor, PT Physical Therapist    LYDIA Birmingham PTA Physical Therapy Assistant          Physical Therapy Education     Title: PT OT SLP Therapies (Done)     Topic: Physical Therapy (Done)     Point: Mobility training (Done)    Learning Progress Summary    Learner Readiness Method Response Comment Documented by Status    Patient Acceptance E VU,DU use of rw for increased balance,wear shoes at home due to neuropathy LN 04/06/17 1427 Done    Acceptance E NR  LN 04/06/17 1145 Active    Acceptance E NR  AME 04/05/17 1141 Active               Point: Precautions (Done)    Learning Progress Summary    Learner Readiness Method Response Comment Documented by Status   Patient Acceptance E VU,DU use of rw for increased balance,wear shoes at home due to neuropathy LN 04/06/17 1427 Done    Acceptance E NR  LN 04/06/17 1145 Active    Acceptance E NR  AME 04/05/17 1141 Active                      User Key     Initials Effective Dates Name Provider Type Discipline     10/17/16 -  Aure Villaseñor, PT Physical Therapist PT     10/17/16 -  Adry Birmingham, PTA Physical Therapy Assistant PT                    PT Recommendation and Plan  Anticipated Discharge Disposition: home with assist  Planned Therapy Interventions: balance training, gait training, patient/family education, stair training, strengthening, transfer training  Plan of Care Review  Plan Of Care Reviewed With: patient, spouse  Progress: improving  Outcome Summary/Follow up Plan: amb 100' with rw and sba of 1,declined step training stating they were small steps and she would not have problems getting in the house-will d/c home with spouse and hh pt-1/5 goals met          Outcome Measures       04/06/17 1405 04/06/17 1115 04/05/17 0955    How much help from another person do you currently need...    Turning from your back to your side while in flat bed without using bedrails? 4  -LN 4  -LN 4  -AME    Moving from lying on back to sitting on the side of a flat bed without bedrails? 3  -LN 3  -LN 3  -AME    Moving to and from a bed to a chair (including a wheelchair)? 3  -LN 3  -LN 3  -AME    Standing up from a chair using your arms (e.g., wheelchair, bedside chair)? 3  -LN 3  -LN 3  -AME    Climbing 3-5 steps with a railing? 3  -LN 3  -LN 3  -AME    To walk in hospital room? 3  -LN 3  -LN 3  -AME     AM-PAC 6 Clicks Score 19  -LN 19  -LN 19  -AME    Functional Assessment    Outcome Measure Options AM-PAC 6 Clicks Basic Mobility (PT)  -LN AM-PAC 6 Clicks Basic Mobility (PT)  -LN AM-PAC 6 Clicks Basic Mobility (PT)  -AME      User Key  (r) = Recorded By, (t) = Taken By, (c) = Cosigned By    Initials Name Provider Type    AME Aure Villaseñor, PT Physical Therapist    LN Adry Birmingham, PTA Physical Therapy Assistant           Time Calculation:         PT Charges       04/06/17 1405 04/06/17 1115       Time Calculation    Start Time 1405  -LN 1115  -LN     Stop Time 1420  -LN 1130  -LN     Time Calculation (min) 15 min  -LN 15 min  -LN     PT Received On 04/06/17  -LN      Time Calculation- PT    Total Timed Code Minutes- PT 15 minute(s)  -LN 15 minute(s)  -LN       User Key  (r) = Recorded By, (t) = Taken By, (c) = Cosigned By    Initials Name Provider Type    LN Adry Birmingham, PTA Physical Therapy Assistant          Therapy Charges for Today     Code Description Service Date Service Provider Modifiers Qty    04913208476 HC GAIT TRAINING EA 15 MIN 4/6/2017 Adry Birmingham, PTA GP 1    33117694936 HC GAIT TRAINING EA 15 MIN 4/6/2017 Adry Birmingham, PTA GP 1          PT G-Codes  PT Professional Judgement Used?: Yes  Outcome Measure Options: AM-PAC 6 Clicks Basic Mobility (PT)  Score: 19  Functional Limitation: Mobility: Walking and moving around  Mobility: Walking and Moving Around Current Status (): At least 20 percent but less than 40 percent impaired, limited or restricted  Mobility: Walking and Moving Around Goal Status (): 0 percent impaired, limited or restricted    Adry Birmingham PTA  4/6/2017

## 2017-04-07 NOTE — DISCHARGE SUMMARY
[facility] Medicine Services  DISCHARGE SUMMARY       Date of Admission: 4/3/2017  Date of Discharge:  4/6/2017  Primary Care Physician: Francisca Fong MD    Presenting Problem/History of Present Illness:  Orthostatic hypotension [I95.1]  Atypical chest pain [R07.89]  Diarrhea, unspecified type [R19.7]  Atypical chest pain [R07.89]       Final Discharge Diagnoses:  1) Orthostatic hypotension  2) Acute diarrhea  3) Hypokalemia  5) Cystitis   6) Type II diabetes mellitus, uncontrolledonsults:   Consults     Date and Time Order Name Status Description    3/11/2017 1650 Inpatient Consult to Cardiology Completed           Procedures Performed: none                 Pertinent Test Results:       Chest Xray :  IMPRESSION:  CONCLUSION: No evidence of active disease.     Chief Complaint on Day of Discharge: none     Hospital Course:Patient presents to ED with complaints of feeling dizzy and having a fall at home. Patient reports that she was standing up to ambulate, when she felt dizzy upon standing. Afterwards, she did fall. No loss of consciousness after fall, did not hit her head. No blurry vision or aura symptoms before the fall. After the fall, no loss of bowel/bladder habits. For the past several days, complaining of having watery diarrhea. She is recently getting over a recent influenza infection. Today, she states she has some shortness of air at times with tightness in her chest occasionally which she attributes to her recent infection. No N/V reported. No diaphoresis reported.    Her troponins were WNL. Her diarrhea subsided  While in the hospital after hydration and holding some of  her BP medicine her BP improved .  She was discharged with home health and she will follow up with Dr Kwon     Condition on Discharge:  Stable      Physical Exam on Discharge:  BP 98/51 (BP Location: Right arm, Patient Position: Lying)  Pulse 75  Temp 96.7 °F (35.9 °C) (Temporal  "Artery )   Resp 18  Ht 68\" (172.7 cm)  Wt 263 lb 12.8 oz (120 kg)  SpO2 97%  BMI 40.11 kg/m2  Physical Exam   Constitutional: She is oriented to person, place, and time. She appears well-developed and well-nourished.  Non-toxic appearance. She does not have a sickly appearance. She does not appear ill. No distress. She is not intubated.   HENT:   Head: Normocephalic and atraumatic.   Right Ear: External ear normal.   Left Ear: External ear normal.   Nose: Nose normal.   Mouth/Throat: Oropharynx is clear and moist. No oropharyngeal exudate.   Eyes: Conjunctivae and EOM are normal. Pupils are equal, round, and reactive to light. Right eye exhibits no discharge and no exudate. Left eye exhibits no discharge and no exudate. Right conjunctiva is not injected. Right conjunctiva has no hemorrhage. Left conjunctiva is not injected. Left conjunctiva has no hemorrhage. No scleral icterus.   Neck: Normal range of motion. Neck supple. No hepatojugular reflux and no JVD present. No tracheal tenderness, no spinous process tenderness and no muscular tenderness present. Carotid bruit is not present. No rigidity. No tracheal deviation, no edema, no erythema and normal range of motion present. No thyroid mass and no thyromegaly present.   Cardiovascular: Normal rate, regular rhythm, normal heart sounds and intact distal pulses.   No extrasystoles are present. Exam reveals no gallop and no friction rub.    No murmur heard.  Pulmonary/Chest: Effort normal and breath sounds normal. No stridor. She is not intubated. No respiratory distress. She has no decreased breath sounds. She has no wheezes. She has no rhonchi. She has no rales. She exhibits no tenderness.   Abdominal: Soft. Normal appearance and bowel sounds are normal. She exhibits no shifting dullness, no distension, no pulsatile liver, no fluid wave, no abdominal bruit, no ascites, no pulsatile midline mass and no mass. There is no hepatosplenomegaly, splenomegaly or " hepatomegaly. There is no tenderness. There is no rigidity, no rebound, no guarding, no CVA tenderness, no tenderness at McBurney's point and negative Coombs's sign. No hernia.   Genitourinary: Rectal exam shows guaiac negative stool.   Musculoskeletal: Normal range of motion. She exhibits no edema, tenderness or deformity.        Right shoulder: She exhibits no swelling.      Skin Integrity  -   She hasno right foot ulcer, non-callous right foot, no right foot warmth and no right foot blister. She has no left foot ulcer, non-callous left foot, no left foot warmth and no left foot blister..  Lymphadenopathy:     She has no cervical adenopathy.     She has no axillary adenopathy.   Neurological: She is alert and oriented to person, place, and time. She has normal strength and normal reflexes. She is not disoriented. She displays no atrophy, no tremor and normal reflexes. No cranial nerve deficit or sensory deficit. She exhibits normal muscle tone. She displays no seizure activity. Coordination and gait normal. She displays no Babinski's sign on the right side. She displays no Babinski's sign on the left side.   Skin: Skin is warm and dry. No abrasion, no bruising, no burn, no ecchymosis, no laceration, no lesion, no purpura and no rash noted. Rash is not macular, not papular, not maculopapular, not nodular, not pustular, not vesicular and not urticarial. She is not diaphoretic. No cyanosis or erythema. No pallor. Nails show no clubbing.   Psychiatric: Judgment and thought content normal. Her mood appears not anxious. Her affect is not angry, not blunt, not labile and not inappropriate. Her speech is not slurred. She is not agitated, not aggressive, not hyperactive, not slowed, not withdrawn and not combative. Thought content is not paranoid and not delusional. Cognition and memory are not impaired. She does not express impulsivity or inappropriate judgment. She does not exhibit a depressed mood. She expresses no  "homicidal and no suicidal ideation. She expresses no suicidal plans and no homicidal plans. She is communicative. She exhibits normal recent memory and normal remote memory.         Discharge Disposition:  Home-Health Care [facility]    Discharge Medications:   Ariana Matrinez   Home Medication Instructions ZOILA:[id number]    Printed on:04/06/17 1955   Medication Information                      ARIPiprazole (ABILIFY) 15 MG tablet  TAKE 1 TABLET BY MOUTH DAILY.             aspirin 81 MG chewable tablet  Chew 81 mg daily.             atorvastatin (LIPITOR) 40 MG tablet  Take 40 mg by mouth Every Evening.             B-D ULTRAFINE III SHORT PEN 31G X 8 MM misc  USE AS DIRECTED 4 TIMES DAILY             Calcium Citrate-Vitamin D (CITRACAL/VITAMIN D) 250-200 MG-UNIT tablet  Take 2 tablets by mouth 2 (two) times a day.             carvedilol (COREG) 3.125 MG tablet  TAKE 1 TABLET BY MOUTH TWICE A DAY             clopidogrel (PLAVIX) 75 MG tablet  Take 1 tablet by mouth Daily.             furosemide (LASIX) 40 MG tablet  Take 1 tablet by mouth Daily.             gabapentin (NEURONTIN) 600 MG tablet  Take 600 mg by mouth 3 (three) times a day.             glucagon (GLUCAGON EMERGENCY) 1 MG injection  Inject 1 mg under the skin 1 (one) time as needed for low blood sugar.             HYDROcodone-acetaminophen (NORCO) 7.5-325 MG per tablet  Take 1 tablet by mouth Every 4 (Four) Hours As Needed for Moderate Pain (4-6).             insulin aspart (NOVOLOG FLEXPEN) 100 UNIT/ML solution pen-injector sc pen  Inject 40 units with meals             Insulin Degludec 100 UNIT/ML solution pen-injector  Inject 70 Units under the skin Daily.             Insulin Syringe 29G X 1/2\" 0.3 ML misc  Use 3 times a day             KETOCARE strip  USE 1 STRIP TO CHECK FOR KETONES IF BLOOD SUGAR IS GREATER THAN 250             lisinopril (PRINIVIL,ZESTRIL) 2.5 MG tablet  Take 1 tablet by mouth daily.             LORazepam (ATIVAN) 2 MG tablet  Take 1 " tablet by mouth 2 (Two) Times a Day As Needed for anxiety.             MAGOX 400 400 (241.3 MG) MG tablet tablet  TAKE 1 TABLET BY MOUTH TWICE A DAY             mirtazapine (REMERON) 45 MG tablet  TAKE 1 TABLET BY MOUTH EVERY DAY AT BEDTIME             nabumetone (RELAFEN) 500 MG tablet  Take 1 tablet by mouth 2 (Two) Times a Day As Needed for mild pain (1-3).             ondansetron (ZOFRAN) 8 MG tablet  Take 1 tablet by mouth every 8 (eight) hours.             ONE TOUCH ULTRA TEST test strip  USE TO TEST SUGAR 4 TIMES A DAY             pantoprazole (PROTONIX) 40 MG EC tablet  Take 1 tablet by mouth daily.             ranolazine (RANEXA) 500 MG 12 hr tablet  Take 2 tablets by mouth 2 (Two) Times a Day.             venlafaxine XR (EFFEXOR-XR) 150 MG 24 hr capsule  TAKE ONE CAPSULE BY MOUTH TWICE A DAY FOR DEPRESSION             vitamin D (ERGOCALCIFEROL) 56939 UNITS capsule capsule  TAKE ONE CAPSULE BY MOUTH EVERY SUNDAY                 Discharge Diet:   Diet Instructions     Diet: Cardiac, Consistent Carbohydrate; Thin Liquids, No Restrictions       Discharge Diet:   Cardiac  Consistent Carbohydrate      Fluid Consistency:  Thin Liquids, No Restrictions                 Activity at Discharge:   Activity Instructions     Activity as Tolerated                     Discharge Care Plan/Instructions: home health     Follow-up Appointments:   Future Appointments  Date Time Provider Department Center   4/24/2017 9:15 AM BEBE Prince [facility] END [facility] None   5/8/2017 11:15 AM Francisca Fong MD [facility] PC POW None   6/15/2017 3:00 PM Can Kwon MD PhD [facility] CD [facility]AD None       Test Results Pending at Discharge:  Order Current Status    Blood Culture Preliminary result    Blood Culture Preliminary result          Naomi Gardner MD  04/06/17  7:55 PM    Time: 30 min

## 2017-04-10 LAB
BACTERIA SPEC AEROBE CULT: NORMAL
BACTERIA SPEC AEROBE CULT: NORMAL

## 2017-04-11 ENCOUNTER — OFFICE VISIT (OUTPATIENT)
Dept: CARDIOLOGY | Facility: CLINIC | Age: 57
End: 2017-04-11

## 2017-04-11 VITALS
DIASTOLIC BLOOD PRESSURE: 64 MMHG | HEIGHT: 68 IN | SYSTOLIC BLOOD PRESSURE: 110 MMHG | WEIGHT: 255 LBS | HEART RATE: 105 BPM | OXYGEN SATURATION: 98 % | BODY MASS INDEX: 38.65 KG/M2

## 2017-04-11 DIAGNOSIS — E78.2 MIXED HYPERLIPIDEMIA: ICD-10-CM

## 2017-04-11 DIAGNOSIS — I10 ESSENTIAL HYPERTENSION: ICD-10-CM

## 2017-04-11 DIAGNOSIS — I25.10 CORONARY ARTERY DISEASE INVOLVING NATIVE CORONARY ARTERY OF NATIVE HEART WITHOUT ANGINA PECTORIS: Primary | ICD-10-CM

## 2017-04-11 DIAGNOSIS — R06.09 DYSPNEA ON EXERTION: ICD-10-CM

## 2017-04-11 LAB
BH CV ECHO MEAS - ACS: 2 CM
BH CV ECHO MEAS - AO ISTHMUS: 2.6 CM
BH CV ECHO MEAS - AO MAX PG (FULL): 3.6 MMHG
BH CV ECHO MEAS - AO MAX PG: 7.7 MMHG
BH CV ECHO MEAS - AO MEAN PG (FULL): 2 MMHG
BH CV ECHO MEAS - AO MEAN PG: 4 MMHG
BH CV ECHO MEAS - AO ROOT AREA (BSA CORRECTED): 1.2
BH CV ECHO MEAS - AO ROOT AREA: 6.2 CM^2
BH CV ECHO MEAS - AO ROOT DIAM: 2.8 CM
BH CV ECHO MEAS - AO V2 MAX: 139 CM/SEC
BH CV ECHO MEAS - AO V2 MEAN: 95 CM/SEC
BH CV ECHO MEAS - AO V2 VTI: 25.5 CM
BH CV ECHO MEAS - ASC AORTA: 3.1 CM
BH CV ECHO MEAS - AVA(I,A): 4.1 CM^2
BH CV ECHO MEAS - AVA(I,D): 4.1 CM^2
BH CV ECHO MEAS - AVA(V,A): 3.9 CM^2
BH CV ECHO MEAS - AVA(V,D): 3.9 CM^2
BH CV ECHO MEAS - BSA(HAYCOCK): 2.4 M^2
BH CV ECHO MEAS - BSA: 2.3 M^2
BH CV ECHO MEAS - BZI_BMI: 40 KILOGRAMS/M^2
BH CV ECHO MEAS - BZI_METRIC_HEIGHT: 172.7 CM
BH CV ECHO MEAS - BZI_METRIC_WEIGHT: 119.3 KG
BH CV ECHO MEAS - EDV(CUBED): 64 ML
BH CV ECHO MEAS - EDV(TEICH): 70 ML
BH CV ECHO MEAS - EF(CUBED): 65.7 %
BH CV ECHO MEAS - EF(MOD-SP4): 57 %
BH CV ECHO MEAS - EF(TEICH): 57.8 %
BH CV ECHO MEAS - ESV(CUBED): 22 ML
BH CV ECHO MEAS - ESV(TEICH): 29.6 ML
BH CV ECHO MEAS - FS: 30 %
BH CV ECHO MEAS - IVS/LVPW: 1.2
BH CV ECHO MEAS - IVSD: 1.2 CM
BH CV ECHO MEAS - LA DIMENSION: 3.6 CM
BH CV ECHO MEAS - LA/AO: 1.3
BH CV ECHO MEAS - LV MASS(C)D: 145.6 GRAMS
BH CV ECHO MEAS - LV MASS(C)DI: 63.4 GRAMS/M^2
BH CV ECHO MEAS - LV MAX PG: 4.1 MMHG
BH CV ECHO MEAS - LV MEAN PG: 2 MMHG
BH CV ECHO MEAS - LV V1 MAX: 101 CM/SEC
BH CV ECHO MEAS - LV V1 MEAN: 67.2 CM/SEC
BH CV ECHO MEAS - LV V1 VTI: 19.5 CM
BH CV ECHO MEAS - LVIDD: 4 CM
BH CV ECHO MEAS - LVIDS: 2.8 CM
BH CV ECHO MEAS - LVOT AREA (M): 5.3 CM^2
BH CV ECHO MEAS - LVOT AREA: 5.3 CM^2
BH CV ECHO MEAS - LVOT DIAM: 2.6 CM
BH CV ECHO MEAS - LVPWD: 1 CM
BH CV ECHO MEAS - MR MAX PG: 57.2 MMHG
BH CV ECHO MEAS - MR MAX VEL: 378 CM/SEC
BH CV ECHO MEAS - MV A MAX VEL: 72.6 CM/SEC
BH CV ECHO MEAS - MV DEC SLOPE: 799 CM/SEC^2
BH CV ECHO MEAS - MV E MAX VEL: 95.3 CM/SEC
BH CV ECHO MEAS - MV E/A: 1.3
BH CV ECHO MEAS - MV MAX PG: 5 MMHG
BH CV ECHO MEAS - MV MEAN PG: 2 MMHG
BH CV ECHO MEAS - MV P1/2T MAX VEL: 113 CM/SEC
BH CV ECHO MEAS - MV P1/2T: 41.4 MSEC
BH CV ECHO MEAS - MV V2 MAX: 112 CM/SEC
BH CV ECHO MEAS - MV V2 MEAN: 71.4 CM/SEC
BH CV ECHO MEAS - MV V2 VTI: 22.6 CM
BH CV ECHO MEAS - MVA P1/2T LCG: 1.9 CM^2
BH CV ECHO MEAS - MVA(P1/2T): 5.3 CM^2
BH CV ECHO MEAS - MVA(VTI): 4.6 CM^2
BH CV ECHO MEAS - PA MAX PG: 5.1 MMHG
BH CV ECHO MEAS - PA V2 MAX: 113 CM/SEC
BH CV ECHO MEAS - PI END-D VEL: 73.8 CM/SEC
BH CV ECHO MEAS - RAP SYSTOLE: 5 MMHG
BH CV ECHO MEAS - RVDD: 3.5 CM
BH CV ECHO MEAS - SI(AO): 68.4 ML/M^2
BH CV ECHO MEAS - SI(CUBED): 18.3 ML/M^2
BH CV ECHO MEAS - SI(LVOT): 45.1 ML/M^2
BH CV ECHO MEAS - SI(TEICH): 17.6 ML/M^2
BH CV ECHO MEAS - SV(AO): 157 ML
BH CV ECHO MEAS - SV(CUBED): 42 ML
BH CV ECHO MEAS - SV(LVOT): 103.5 ML
BH CV ECHO MEAS - SV(TEICH): 40.4 ML
BH CV ECHO MEAS - TR MAX VEL: 284 CM/SEC

## 2017-04-11 PROCEDURE — 99214 OFFICE O/P EST MOD 30 MIN: CPT | Performed by: INTERNAL MEDICINE

## 2017-04-11 RX ORDER — METOPROLOL SUCCINATE 25 MG/1
25 TABLET, EXTENDED RELEASE ORAL DAILY
Qty: 31 TABLET | Refills: 13 | Status: SHIPPED | OUTPATIENT
Start: 2017-04-11 | End: 2018-05-09 | Stop reason: SDUPTHER

## 2017-04-11 RX ORDER — HYDROCHLOROTHIAZIDE 12.5 MG/1
12.5 CAPSULE, GELATIN COATED ORAL DAILY
COMMUNITY
End: 2017-06-20 | Stop reason: HOSPADM

## 2017-04-11 RX ORDER — HYDROCHLOROTHIAZIDE 12.5 MG/1
12.5 TABLET ORAL DAILY
COMMUNITY
End: 2017-04-11

## 2017-04-11 NOTE — PROGRESS NOTES
Cardiovascular Medicine   Can Kwon M.D., Ph.D., [facility]    Thank you for asking me to see Ariana Martinez for establishing care.    Chest Pain        This is a 57 y.o. female with:  1. MV ASCAD  A. CABG, 2005  -LIMA-LAD: Prior PCI 2.5 x 28mm in 2004  -RCA, 60%  B. LHC, 6/2016  -pLAD: 50%  -Ostial D1-30%  -mLAD: 100%  -dLAD fills via LIMA  -OM1: 60%  -FFR was 0.93  2. HTN  3. HLD  4. DM2  5. Mild MR/TR    ASCAD  The pt was diagnosed with CAD in 2004 and underwent PCI to LAD. She had subsequent restenosis and went for a LIMA to LAD in 2005. She  Had recurrent pain in summer of 2016. Repeat LHC revealed patent LIMA and a 60% OM1 lesion with a FFR of 0.93. She has been maintained on OMT. She is currently on ASA, Plavix, Coreg and Lipitor.  At her last visit she had been admitted for chest pain.  I started Ranexa.  She's been admitted again for an infection that actually caused hypotension.  Some of her blood pressure medications initially had to be held.  She presents as a hospital discharge follow-up today.     Dyspnea  She has stable dyspnea. She does have DD and LVH. She is having essentially class II symptoms. No LE edema. No smoking history. She has had significant secondhand smoke.     HTN  Concerning the patient's hypertension, the patient is currently taking Lisinopril and HCTZ.  She has continued with some exertional dizziness, but no syncope.     HLD  Concerning the patient's hyperlipidemia, the patient remains on a statin.  They are tolerating this very well.  Denies side effects.  Specifically, the patient denies any prior history of asymptomatic LFT elevation, myositis or myalgias.  Patient's laboratory evaluations are followed closely by their PCP.    Mild MR  Her most recent echocardiogram in April of this year showed mild MR.      Review of Systems - History obtained from chart review and the patient  General ROS: negative  Respiratory ROS: positive for - chest pain    family history includes  Diabetes in her other; Heart disease in her other; Hypertension in her other.     reports that she has never smoked. She has never used smokeless tobacco. She reports that she does not drink alcohol or use illicit drugs.    Allergies   Allergen Reactions   • Seroquel [Quetiapine Fumarate] Anaphylaxis   • Avandia [Rosiglitazone] Swelling   • Morphine And Related Hallucinations     If patient takes to much         Current Outpatient Prescriptions:   •  ARIPiprazole (ABILIFY) 15 MG tablet, TAKE 1 TABLET BY MOUTH DAILY., Disp: 30 tablet, Rfl: 5  •  aspirin 81 MG chewable tablet, Chew 81 mg daily., Disp: , Rfl:   •  atorvastatin (LIPITOR) 40 MG tablet, Take 40 mg by mouth Every Evening., Disp: , Rfl:   •  B-D ULTRAFINE III SHORT PEN 31G X 8 MM misc, USE AS DIRECTED 4 TIMES DAILY, Disp: 150 each, Rfl: 2  •  Calcium Citrate-Vitamin D (CITRACAL/VITAMIN D) 250-200 MG-UNIT tablet, Take 2 tablets by mouth 2 (two) times a day., Disp: , Rfl:   •  carvedilol (COREG) 3.125 MG tablet, TAKE 1 TABLET BY MOUTH TWICE A DAY, Disp: , Rfl: 12  •  clopidogrel (PLAVIX) 75 MG tablet, Take 1 tablet by mouth Daily., Disp: 90 tablet, Rfl: 3  •  furosemide (LASIX) 40 MG tablet, Take 1 tablet by mouth Daily., Disp: 90 tablet, Rfl: 3  •  gabapentin (NEURONTIN) 600 MG tablet, Take 600 mg by mouth 3 (three) times a day., Disp: , Rfl:   •  glucagon (GLUCAGON EMERGENCY) 1 MG injection, Inject 1 mg under the skin 1 (one) time as needed for low blood sugar., Disp: , Rfl:   •  hydrochlorothiazide (MICROZIDE) 12.5 MG capsule, Take 12.5 mg by mouth Daily., Disp: , Rfl:   •  HYDROcodone-acetaminophen (NORCO) 7.5-325 MG per tablet, Take 1 tablet by mouth Every 4 (Four) Hours As Needed for Moderate Pain (4-6)., Disp: 180 tablet, Rfl: 0  •  insulin aspart (NOVOLOG FLEXPEN) 100 UNIT/ML solution pen-injector sc pen, Inject 40 units with meals, Disp: 30 mL, Rfl: 3  •  Insulin Degludec 100 UNIT/ML solution pen-injector, Inject 70 Units under the skin Daily., Disp:  "9 pen, Rfl: 3  •  Insulin Syringe 29G X 1/2\" 0.3 ML misc, Use 3 times a day, Disp: 270 each, Rfl: 3  •  KETOCARE strip, USE 1 STRIP TO CHECK FOR KETONES IF BLOOD SUGAR IS GREATER THAN 250, Disp: , Rfl: 99  •  lisinopril (PRINIVIL,ZESTRIL) 2.5 MG tablet, Take 1 tablet by mouth daily., Disp: 90 tablet, Rfl: 3  •  LORazepam (ATIVAN) 2 MG tablet, Take 1 tablet by mouth 2 (Two) Times a Day As Needed for anxiety., Disp: 60 tablet, Rfl: 2  •  MAGOX 400 400 (241.3 MG) MG tablet tablet, TAKE 1 TABLET BY MOUTH TWICE A DAY, Disp: , Rfl: 12  •  mirtazapine (REMERON) 45 MG tablet, TAKE 1 TABLET BY MOUTH EVERY DAY AT BEDTIME, Disp: 30 tablet, Rfl: 5  •  nabumetone (RELAFEN) 500 MG tablet, Take 1 tablet by mouth 2 (Two) Times a Day As Needed for mild pain (1-3)., Disp: 60 tablet, Rfl: 5  •  ondansetron (ZOFRAN) 8 MG tablet, Take 1 tablet by mouth every 8 (eight) hours., Disp: 90 tablet, Rfl: 3  •  ONE TOUCH ULTRA TEST test strip, USE TO TEST SUGAR 4 TIMES A DAY, Disp: 400 each, Rfl: 1  •  pantoprazole (PROTONIX) 40 MG EC tablet, Take 1 tablet by mouth daily., Disp: 90 tablet, Rfl: 3  •  ranolazine (RANEXA) 500 MG 12 hr tablet, Take 2 tablets by mouth 2 (Two) Times a Day., Disp: 120 tablet, Rfl: 6  •  venlafaxine XR (EFFEXOR-XR) 150 MG 24 hr capsule, TAKE ONE CAPSULE BY MOUTH TWICE A DAY FOR DEPRESSION, Disp: 180 capsule, Rfl: 3  •  vitamin D (ERGOCALCIFEROL) 49606 UNITS capsule capsule, TAKE ONE CAPSULE BY MOUTH EVERY SUNDAY, Disp: , Rfl: 2    Physical Exam:  Vitals:    04/11/17 1329   BP: 110/64   Pulse:    SpO2:      Body mass index is 38.77 kg/(m^2).    GEN: alert, appears stated age and cooperative  Body Habitus: well-nourished  Neuro: CN II-XII grossly intact.   HEENT: Head: Normocephalic, no lesions, without obvious abnormality.  Neck / Thyroid: Supple, no masses, nodes, nodules or enlargement. No arcus senilis, xanthelasma or xanthomas. PERRL. Normal external ears. No drainage. No thyromegaly. Neck supple. No LAD. Trachea " midline. Nose, normal.  JVP: 6 cm of water at 45 degrees HJR: absent      Carotid:  Upstroke: easily palpated bilaterally Volume: Normal.    Carotid Bruit:  None  Subclavian Bruit: Not present.    Lymph: No overt LAD.   Back: Normal.  Chest:  Normal Excursion: Good    I:E: Good  Pulmonary:clear to auscultation, no wheezes, rales or rhonchi, symmetric air entry. Equal chest excursion. Chest physical exam is normal. No tenderness.        Precordium:  No palpable heaves or thrusts. P2 is not palpable.   [place]:  normal size and placement Palpable S4: Not present.   Heart rate: normal  Heart Rhythm: regular     Heart Sounds: S1: normal intensity  S2: normal intensity  S3: absent   S4: absent  Opening Snap: absent  A2-OS:  N/A  Pericardial rub: absent    Ejection click: None      Murmurs: Systolic: none  Diastolic: none  Abdomen: Soft, non-tender, normal bowel sounds; no bruits, organomegaly or masses.  Extremity: no edema, cyanosis  Pulses: Right radial artery has 2+ (normal) pulse and Left radial artery has 2+ (normal) pulse    DATA REVIEWED:   · The study is technically difficult for diagnosis. The quality of the study is limited due to poor acoustic windows related to patient body habitus  · Left Ventricle: Left ventricular function is normal. Calculated EF = 57%.  · Left ventricular wall thickness is consistent with mild concentric hypertrophy  · Left ventricular diastolic dysfunction is noted (grade II w/high LAP) consistent with pseudonormalization. Elevated left atrial pressure.  · Right Ventricle: Normal cavity size, wall thickness, systolic function and septal motion noted  · Inferior vena cava not well visualized  · Mild mitral valve regurgitation is present  · PASP is estimated between 37 and 53 mmHg. Unable to evaluate right-sided filling pressures. Unable to exclude pulmonary hypertension.  · There is no evidence of pericardial effusion.        [facility], as above    TTE, 11/2016:  Preserved LVEF    Assessment/Plan       1. ASCAD. EF: 50%  last documented 11/2016. CCS class I. The patient has been revascularized with LIMA to LAD. The patient has incomplete revascularization. Anatomy with significant obstruction: circumflex.  ASA, Plavix, Atorvastatin  Ranexa, Change Coreg to Toprol XL for marginal HDs    2. HTN, essential.  BP noted to be well controlled today in office.   DASH; medication compliance; Low sodium diet  D/C Lisinopril and continue HCTZ     3. Dyspnea. Etiology is likely multifactorial. Functional class is: Class 2 - comfortable at rest but have symptoms with ordinary physcial activity..There are no signs of HF on examination. The patient does have risk factors for HF. She had some BNPs that were mildly elevated, but not markedly so. I would recommend a thorough pulmonary evaluation prior to evaluation for HFpEF. Additionally, I was unable to see her IVC on her most recent TTE, so her PASP is between 37 and 53mmHg. Will repeat this prior to her next appt.   -Sleep referral to evaluate for CHRISTOPHER  -Will obtain full PFTs, 6MWT  -Pulmonary referral: Dr. Gamboa    4. Cardiac Risk Assessment: Known CAD  Recommended ASA/Statin    4. Dyslipidemia.  Under good control.  -Statin:  Yes.  -Recommended moderate-intensity statin therapy  -Lipid-lowering medications: Lipitor (atorvastatin)    5. Mild MR/TR. NYHA stage B; FC-I.   We'll plan on echocardiogram April 2020    7. Tobacco status: non-smoker     Plan for follow-up: in 2 months      EMR Dragon/Transcription disclaimer:     Much of this encounter note is an electronic transcription. This may permit erroneous, or at times, nonsensical words or phrases to be inadvertently transcribed; Although I have reviewed the note for such errors, some may still exist.

## 2017-04-12 ENCOUNTER — OFFICE VISIT (OUTPATIENT)
Dept: FAMILY MEDICINE CLINIC | Facility: CLINIC | Age: 57
End: 2017-04-12

## 2017-04-12 VITALS
SYSTOLIC BLOOD PRESSURE: 120 MMHG | BODY MASS INDEX: 38.8 KG/M2 | HEIGHT: 68 IN | HEART RATE: 82 BPM | DIASTOLIC BLOOD PRESSURE: 66 MMHG | OXYGEN SATURATION: 95 % | TEMPERATURE: 96.7 F | WEIGHT: 256 LBS

## 2017-04-12 DIAGNOSIS — I95.1 ORTHOSTATIC HYPOTENSION: Primary | ICD-10-CM

## 2017-04-12 DIAGNOSIS — J30.2 SEASONAL ALLERGIC RHINITIS, UNSPECIFIED ALLERGIC RHINITIS TRIGGER: ICD-10-CM

## 2017-04-12 DIAGNOSIS — G89.4 CHRONIC PAIN DISORDER: ICD-10-CM

## 2017-04-12 PROCEDURE — 99214 OFFICE O/P EST MOD 30 MIN: CPT | Performed by: FAMILY MEDICINE

## 2017-04-12 RX ORDER — PREDNISONE 20 MG/1
20 TABLET ORAL DAILY
Qty: 5 TABLET | Refills: 0 | Status: SHIPPED | OUTPATIENT
Start: 2017-04-12 | End: 2017-05-01

## 2017-04-12 RX ORDER — HYDROCODONE BITARTRATE AND ACETAMINOPHEN 7.5; 325 MG/1; MG/1
1 TABLET ORAL EVERY 4 HOURS PRN
Qty: 180 TABLET | Refills: 0 | Status: SHIPPED | OUTPATIENT
Start: 2017-04-12 | End: 2017-05-08 | Stop reason: SDUPTHER

## 2017-04-12 RX ORDER — FLUTICASONE PROPIONATE 50 MCG
2 SPRAY, SUSPENSION (ML) NASAL DAILY
Qty: 1 EACH | Refills: 0 | Status: SHIPPED | OUTPATIENT
Start: 2017-04-12 | End: 2017-05-18 | Stop reason: SDUPTHER

## 2017-04-12 NOTE — PATIENT INSTRUCTIONS
Orthostatic Hypotension  Orthostatic hypotension is a sudden drop in blood pressure. It happens when you quickly stand up from a seated or lying position. You may feel dizzy or light-headed. This can last for just a few seconds or for up to a few minutes. It is usually not a serious problem. However, if this happens frequently or gets worse, it can be a sign of something more serious.  CAUSES   Different things can cause orthostatic hypotension, including:   · Loss of body fluids (dehydration).  · Medicines that lower blood pressure.  · Sudden changes in posture, such as standing up quickly after you have been sitting or lying down.  · Taking too much of your medicine.  SIGNS AND SYMPTOMS   · Light-headedness or dizziness.    · Fainting or near-fainting.    · A fast heart rate.    · Weakness.    · Feeling tired (fatigue).    DIAGNOSIS   Your health care provider may do several things to help diagnose your condition and identify the cause. These may include:   · Taking a medical history and doing a physical exam.  · Checking your blood pressure. Your health care provider will check your blood pressure when you are:    Lying down.    Sitting.    Standing.  · Using tilt table testing. In this test, you lie down on a table that moves from a lying position to a standing position. You will be strapped onto the table. This test monitors your blood pressure and heart rate when you are in different positions.  TREATMENT   Treatment will vary depending on the cause. Possible treatments include:   · Changing the dosage of your medicines.   · Wearing compression stockings on your lower legs.  · Standing up slowly after sitting or lying down.  · Eating more salt.  · Eating frequent, small meals.  · In some cases, getting IV fluids.  · Taking medicine to enhance fluid retention.  HOME CARE INSTRUCTIONS  · Only take over-the-counter or prescription medicines as directed by your health care provider.    Follow your health care  provider's instructions for changing the dosage of your current medicines.    Do not stop or adjust your medicine on your own.  · Stand up slowly after sitting or lying down. This allows your body to adjust to the different position.  · Wear compression stockings as directed.  · Eat extra salt as directed.    Do not add extra salt to your diet unless directed to by your health care provider.  · Eat frequent, small meals.  · Avoid standing suddenly after eating.  · Avoid hot showers or excessive heat as directed by your health care provider.  · Keep all follow-up appointments.  SEEK MEDICAL CARE IF:  · You continue to feel dizzy or light-headed after standing.  · You feel groggy or confused.  · You feel cold, clammy, or sick to your stomach (nauseous).  · You have blurred vision.  · You feel short of breath.  SEEK IMMEDIATE MEDICAL CARE IF:   · You faint after standing.  · You have chest pain.   · You have difficulty breathing.    · You lose feeling or movement in your arms or legs.    · You have slurred speech or difficulty talking, or you are unable to talk.    MAKE SURE YOU:   · Understand these instructions.  · Will watch your condition.  · Will get help right away if you are not doing well or get worse.     This information is not intended to replace advice given to you by your health care provider. Make sure you discuss any questions you have with your health care provider.     Document Released: 12/08/2003 Document Revised: 01/13/2017 Document Reviewed: 10/10/2014  [employer] Interactive Patient Education ©2016 [employer]

## 2017-04-12 NOTE — PROGRESS NOTES
Subjective   Chief Complaint   Patient presents with   • hospital follow up     [place], 4 days   • Med Refill     hydrocodone last dose last night     Ariana Martinez is a 57 y.o. female.   hospital follow up ([place], 4 days) and Med Refill (hydrocodone last dose last night)    History of Present Illness     Here for hospital follow up   Admitted for hypotension secondary to decreased volume due to influenza from the previous week  Discharged from hospital 4/7  She had a follow up with Dr Kwon for evaluation  Medication was adjusted 4/11  Started her on metoprolol and stopped lisinopril, coreg  Echocardiogram - indicated mild regurgitation of valve   Follow up scheduled with Dr Kwon 5/02  Having trouble with orthostatic hypotension when changing positions  Home health is currently seeing the patient - for total of 3 weeks, 2 times per week    C/o postnasal drainage, cough, rhinorrhea    Chronic back and joint pain - controlled with norco, requesting refill today    The following portions of the patient's history were reviewed and updated as appropriate: allergies, current medications, past family history, past medical history, past social history, past surgical history and problem list.    Review of Systems   Constitutional: Negative for appetite change, chills, fatigue and fever.   HENT: Positive for postnasal drip and rhinorrhea. Negative for congestion, ear pain and sore throat.    Eyes: Negative for pain.   Respiratory: Positive for cough. Negative for shortness of breath.    Cardiovascular: Negative for chest pain and palpitations.   Gastrointestinal: Negative for abdominal pain, constipation, diarrhea and nausea.   Genitourinary: Negative for dysuria.   Musculoskeletal: Negative for back pain, joint swelling and neck pain.   Skin: Negative for rash.   Neurological: Positive for dizziness and light-headedness. Negative for headaches.       Objective   /66  Pulse 82  Temp 96.7 °F  "(35.9 °C)  Ht 68\" (172.7 cm)  Wt 256 lb (116 kg)  SpO2 95%  BMI 38.92 kg/m2  Physical Exam   Constitutional: She is oriented to person, place, and time. She appears well-developed and well-nourished.   HENT:   Head: Normocephalic and atraumatic.   Right Ear: External ear normal.   Left Ear: External ear normal.   Nose: Rhinorrhea present. Right sinus exhibits no maxillary sinus tenderness and no frontal sinus tenderness. Left sinus exhibits no maxillary sinus tenderness and no frontal sinus tenderness.   Mouth/Throat: Posterior oropharyngeal erythema present.   Eyes: Pupils are equal, round, and reactive to light.   Neck: Normal range of motion. Neck supple.   Cardiovascular: Normal rate, regular rhythm and normal heart sounds.    Pulmonary/Chest: Effort normal and breath sounds normal. No respiratory distress. She has no wheezes. She has no rales.   Abdominal: Soft. Bowel sounds are normal.   Musculoskeletal: Normal range of motion.   Neurological: She is alert and oriented to person, place, and time.   Skin: Skin is warm and dry.   Psychiatric: She has a normal mood and affect.   Nursing note and vitals reviewed.      Assessment/Plan   Problems Addressed this Visit        Cardiovascular and Mediastinum    Orthostatic hypotension - Primary       Respiratory    Seasonal allergic rhinitis       Other    Chronic pain disorder    Relevant Medications    HYDROcodone-acetaminophen (NORCO) 7.5-325 MG per tablet        Recommend compression stockings  Continue with medications as directed  Follow up with Dr Kwon as kalia  Have home health watch the blood pressure  Increase water intake - 9-10 glasses of water per day  Avoid anything that causes diuresis - coffee, tea, pop  Do not limit sodium intake until otherwise directed    Start flonase  Start predisone - until course completed  Caution with diabetes    Refilled norco    Recheck in 4 weeks           "

## 2017-04-19 ENCOUNTER — APPOINTMENT (OUTPATIENT)
Dept: LAB | Facility: HOSPITAL | Age: 57
End: 2017-04-19

## 2017-04-19 ENCOUNTER — HOSPITAL ENCOUNTER (OUTPATIENT)
Dept: PULMONOLOGY | Facility: HOSPITAL | Age: 57
Discharge: HOME | End: 2017-04-19
Attending: INTERNAL MEDICINE | Admitting: INTERNAL MEDICINE

## 2017-04-19 PROCEDURE — 94727 GAS DIL/WSHOT DETER LNG VOL: CPT | Performed by: INTERNAL MEDICINE

## 2017-04-19 PROCEDURE — 94010 BREATHING CAPACITY TEST: CPT | Performed by: INTERNAL MEDICINE

## 2017-04-19 PROCEDURE — 94010 BREATHING CAPACITY TEST: CPT

## 2017-04-19 PROCEDURE — 94729 DIFFUSING CAPACITY: CPT

## 2017-04-19 PROCEDURE — 94729 DIFFUSING CAPACITY: CPT | Performed by: INTERNAL MEDICINE

## 2017-04-19 PROCEDURE — 94727 GAS DIL/WSHOT DETER LNG VOL: CPT

## 2017-04-24 ENCOUNTER — OFFICE VISIT (OUTPATIENT)
Dept: ENDOCRINOLOGY | Facility: CLINIC | Age: 57
End: 2017-04-24

## 2017-04-24 VITALS
SYSTOLIC BLOOD PRESSURE: 112 MMHG | HEART RATE: 94 BPM | WEIGHT: 254.8 LBS | HEIGHT: 68 IN | BODY MASS INDEX: 38.62 KG/M2 | DIASTOLIC BLOOD PRESSURE: 78 MMHG

## 2017-04-24 DIAGNOSIS — E11.42 TYPE 2 DIABETES MELLITUS WITH DIABETIC POLYNEUROPATHY, WITH LONG-TERM CURRENT USE OF INSULIN (HCC): Primary | ICD-10-CM

## 2017-04-24 DIAGNOSIS — E55.9 VITAMIN D DEFICIENCY: ICD-10-CM

## 2017-04-24 DIAGNOSIS — E06.3 HYPOTHYROIDISM DUE TO HASHIMOTO'S THYROIDITIS: ICD-10-CM

## 2017-04-24 DIAGNOSIS — E03.8 HYPOTHYROIDISM DUE TO HASHIMOTO'S THYROIDITIS: ICD-10-CM

## 2017-04-24 DIAGNOSIS — E78.2 MIXED HYPERLIPIDEMIA: ICD-10-CM

## 2017-04-24 DIAGNOSIS — Z79.4 TYPE 2 DIABETES MELLITUS WITH DIABETIC POLYNEUROPATHY, WITH LONG-TERM CURRENT USE OF INSULIN (HCC): Primary | ICD-10-CM

## 2017-04-24 PROCEDURE — 99214 OFFICE O/P EST MOD 30 MIN: CPT | Performed by: NURSE PRACTITIONER

## 2017-04-24 RX ORDER — ERGOCALCIFEROL 1.25 MG/1
CAPSULE ORAL
Qty: 12 CAPSULE | Refills: 2 | Status: SHIPPED | OUTPATIENT
Start: 2017-04-24 | End: 2017-12-23 | Stop reason: SDUPTHER

## 2017-04-24 NOTE — PROGRESS NOTES
Subjective    Ariana Martinez is a 57 y.o. female. she is here today for follow-up.    History of Present Illness         Duration/Timing: Diabetes mellitus type 2, Age at onset of diabetes: 26 years years, Onset of symptoms gradual  timing - constant     qualtiy - uncontrolled     severity - moderate        -----------------------     Severity (Complications/Hospitalizations)  Secondary Macrovascular Complications: No CAD, No CVA, No PAD  Secondary Microvascular Complications: No Diabetic Nephropathy, No Diabetic Retinopathy, Diabetic Neuropathy     Context  Diabetes Regimen: Insulin, Oral Medications, Last HgbA1c% 12.3 from Jan. 2017  Blood Glucose Readings      200 to 300     Denies any low sugars     Diet  variable carb intake  Exercise: Exercises  walking     Associated Signs/Symptoms  Hyperglycemic Symptoms: No polyuria, No polydipsia, No polyphagia, No weight gain  Hypoglycemic Episodes: Documented symptomatic hypoglycemia, Seizures and syncope related to hypoglycemia            The following portions of the patient's history were reviewed and updated as appropriate:   Past Medical History:   Diagnosis Date   • Acquired hypothyroidism    • Angina, class IV    • Anxiety    • Benign paroxysmal positional vertigo    • Carpal tunnel syndrome    • Chronic pain    • Coronary atherosclerosis    • Depression    • Diabetes mellitus     Type 2, controlled   • Diabetic polyneuropathy    • Female stress incontinence    • Hashimoto's thyroiditis    • Hyperlipidemia    • Hypertension    • Low back pain    • Malaise and fatigue    • Multiple joint pain    • Obesity     Refuses to be weighed   • Otalgia     Both   • Perforation of tympanic membrane     Left   • Postoperative wound infection    • Vitamin D deficiency      Past Surgical History:   Procedure Laterality Date   • ABDOMINAL SURGERY     • ANGIOPLASTY      coronary   • CARDIAC CATHETERIZATION     • CARPAL TUNNEL RELEASE     • CHOLECYSTECTOMY     • CORONARY  "ARTERY BYPASS GRAFT     • ENDOSCOPY AND COLONOSCOPY     • FOOT SURGERY      Toes   • GASTRIC BANDING      Revision, laparoscopic   • HYSTERECTOMY     • MOUTH SURGERY     • SALPINGO OOPHORECTOMY     • SHOULDER SURGERY     • TRANSESOPHAGEAL ECHOCARDIOGRAM ([facility]AMONTE)      With color flow     Family History   Problem Relation Age of Onset   • Diabetes Other    • Heart disease Other    • Hypertension Other      OB History     No data available        Current Outpatient Prescriptions   Medication Sig Dispense Refill   • ARIPiprazole (ABILIFY) 15 MG tablet TAKE 1 TABLET BY MOUTH DAILY. 30 tablet 5   • aspirin 81 MG chewable tablet Chew 81 mg daily.     • atorvastatin (LIPITOR) 40 MG tablet Take 40 mg by mouth Every Evening.     • B-D ULTRAFINE III SHORT PEN 31G X 8 MM misc USE AS DIRECTED 4 TIMES DAILY 150 each 2   • Calcium Citrate-Vitamin D (CITRACAL/VITAMIN D) 250-200 MG-UNIT tablet Take 2 tablets by mouth 2 (two) times a day.     • clopidogrel (PLAVIX) 75 MG tablet Take 1 tablet by mouth Daily. 90 tablet 3   • fluticasone (FLONASE) 50 MCG/ACT nasal spray 2 sprays into each nostril Daily for 30 days. 1 each 0   • furosemide (LASIX) 40 MG tablet Take 1 tablet by mouth Daily. 90 tablet 3   • gabapentin (NEURONTIN) 600 MG tablet Take 600 mg by mouth 3 (three) times a day.     • glucagon (GLUCAGON EMERGENCY) 1 MG injection Inject 1 mg under the skin 1 (one) time as needed for low blood sugar.     • hydrochlorothiazide (MICROZIDE) 12.5 MG capsule Take 12.5 mg by mouth Daily.     • HYDROcodone-acetaminophen (NORCO) 7.5-325 MG per tablet Take 1 tablet by mouth Every 4 (Four) Hours As Needed for Moderate Pain (4-6). 180 tablet 0   • insulin aspart (NOVOLOG FLEXPEN) 100 UNIT/ML solution pen-injector sc pen Inject 40 units with meals 30 mL 3   • Insulin Degludec 100 UNIT/ML solution pen-injector Inject 70 Units under the skin Daily. 9 pen 3   • Insulin Syringe 29G X 1/2\" 0.3 ML misc Use 3 times a day 270 each 3   • KETOCARE strip USE " 1 STRIP TO CHECK FOR KETONES IF BLOOD SUGAR IS GREATER THAN 250  99   • LORazepam (ATIVAN) 2 MG tablet Take 1 tablet by mouth 2 (Two) Times a Day As Needed for anxiety. 60 tablet 2   • MAGOX 400 400 (241.3 MG) MG tablet tablet TAKE 1 TABLET BY MOUTH TWICE A DAY  12   • metoprolol succinate XL (TOPROL-XL) 25 MG 24 hr tablet Take 1 tablet by mouth Daily. 31 tablet 13   • mirtazapine (REMERON) 45 MG tablet TAKE 1 TABLET BY MOUTH EVERY DAY AT BEDTIME 30 tablet 5   • nabumetone (RELAFEN) 500 MG tablet Take 1 tablet by mouth 2 (Two) Times a Day As Needed for mild pain (1-3). 60 tablet 5   • ondansetron (ZOFRAN) 8 MG tablet Take 1 tablet by mouth every 8 (eight) hours. 90 tablet 3   • ONE TOUCH ULTRA TEST test strip USE TO TEST SUGAR 4 TIMES A  each 1   • pantoprazole (PROTONIX) 40 MG EC tablet Take 1 tablet by mouth daily. 90 tablet 3   • predniSONE (DELTASONE) 20 MG tablet Take 1 tablet by mouth Daily. 5 tablet 0   • ranolazine (RANEXA) 500 MG 12 hr tablet Take 2 tablets by mouth 2 (Two) Times a Day. 120 tablet 6   • SITagliptin (JANUVIA) 100 MG tablet 100 mg by mouth  daily before breakfast 30 tablet 11   • venlafaxine XR (EFFEXOR-XR) 150 MG 24 hr capsule TAKE ONE CAPSULE BY MOUTH TWICE A DAY FOR DEPRESSION 180 capsule 3   • vitamin D (ERGOCALCIFEROL) 86077 UNITS capsule capsule TAKE ONE CAPSULE BY MOUTH EVERY SUNDAY  2     No current facility-administered medications for this visit.      Allergies   Allergen Reactions   • Seroquel [Quetiapine Fumarate] Anaphylaxis   • Avandia [Rosiglitazone] Swelling   • Morphine And Related Hallucinations     If patient takes to much     Social History     Social History   • Marital status:      Spouse name: N/A   • Number of children: N/A   • Years of education: N/A     Social History Main Topics   • Smoking status: Never Smoker   • Smokeless tobacco: Never Used   • Alcohol use No   • Drug use: No   • Sexual activity: Defer     Other Topics Concern   • None     Social  "History Narrative       Review of Systems  Review of Systems   Constitutional: Negative for activity change, appetite change, chills, diaphoresis and fatigue.   HENT: Negative for congestion, dental problem, drooling, ear discharge, ear pain, facial swelling, sneezing, sore throat, tinnitus, trouble swallowing and voice change.    Eyes: Negative for photophobia, pain, discharge, redness, itching and visual disturbance.   Respiratory: Negative for apnea, cough, choking, chest tightness and shortness of breath.    Cardiovascular: Negative for chest pain, palpitations and leg swelling.   Gastrointestinal: Negative for abdominal distention, abdominal pain, constipation, diarrhea, nausea and vomiting.   Endocrine: Negative for cold intolerance, heat intolerance, polydipsia, polyphagia and polyuria.   Genitourinary: Negative for difficulty urinating, dysuria, frequency, hematuria and urgency.   Musculoskeletal: Negative for arthralgias, back pain, gait problem, joint swelling, myalgias, neck pain and neck stiffness.   Skin: Negative for color change, pallor, rash and wound.   Allergic/Immunologic: Negative for environmental allergies, food allergies and immunocompromised state.   Neurological: Negative for dizziness, tremors, facial asymmetry, weakness, light-headedness, numbness and headaches.   Hematological: Negative for adenopathy. Does not bruise/bleed easily.   Psychiatric/Behavioral: Negative for agitation, behavioral problems, confusion, decreased concentration and sleep disturbance.        Objective    /78 (BP Location: Right arm, Patient Position: Sitting, Cuff Size: Adult)  Pulse 94  Ht 68\" (172.7 cm)  Wt 254 lb 12.8 oz (116 kg)  BMI 38.74 kg/m2  Physical Exam   Constitutional: She is oriented to person, place, and time. She appears well-developed and well-nourished. No distress.   HENT:   Head: Normocephalic and atraumatic.   Right Ear: External ear normal.   Left Ear: External ear normal.   Nose: " Nose normal.   Eyes: Conjunctivae and EOM are normal. Pupils are equal, round, and reactive to light.   Neck: Normal range of motion. Neck supple. No tracheal deviation present. No thyromegaly present.   Cardiovascular: Normal rate, regular rhythm and normal heart sounds.    No murmur heard.  Pulmonary/Chest: Effort normal and breath sounds normal. No respiratory distress. She has no wheezes.   Abdominal: Soft. Bowel sounds are normal. There is no tenderness. There is no rebound and no guarding.   Musculoskeletal: Normal range of motion. She exhibits no edema, tenderness or deformity.   Neurological: She is alert and oriented to person, place, and time. No cranial nerve deficit.   Skin: Skin is warm and dry. No rash noted.   Psychiatric: She has a normal mood and affect. Her behavior is normal. Judgment and thought content normal.       Lab Review  Glucose (mg/dL)   Date Value   04/06/2017 213 (H)   04/05/2017 40 (C)   04/05/2017 236 (H)     Sodium (mmol/L)   Date Value   04/06/2017 140   04/05/2017 138   04/03/2017 135 (L)     Potassium (mmol/L)   Date Value   04/06/2017 4.6   04/05/2017 3.5   04/03/2017 2.9 (L)     Chloride (mmol/L)   Date Value   04/06/2017 107   04/05/2017 103   04/03/2017 100     CO2 (mmol/L)   Date Value   04/06/2017 24.0   04/05/2017 25.0   04/03/2017 24.0     BUN (mg/dL)   Date Value   04/06/2017 12   04/05/2017 13   04/03/2017 14     Creatinine (mg/dL)   Date Value   04/06/2017 0.86   04/05/2017 1.05 (H)   04/03/2017 1.13 (H)     Hemoglobin A1C   Date Value   04/05/2017 11.43 % (H)   03/11/2017 11.99 % (H)   01/19/2017 12.3 %TotHgb (H)   08/26/2016 11.0 %TotHgb (H)   05/26/2016 11.5 %TotHgb (H)     Triglycerides   Date Value   03/12/2017 290 mg/dL (H)   01/19/2017 235 mg/dl (H)   11/12/2016 290 mg/dl (H)   06/20/2016 267 mg/dl (H)       Assessment/Plan      1. Type 2 diabetes mellitus with diabetic polyneuropathy, with long-term current use of insulin    2. Hypothyroidism due to Hashimoto's  "thyroiditis    3. Vitamin D deficiency    4. Mixed hyperlipidemia    .    Medications prescribed:  Outpatient Encounter Prescriptions as of 4/24/2017   Medication Sig Dispense Refill   • ARIPiprazole (ABILIFY) 15 MG tablet TAKE 1 TABLET BY MOUTH DAILY. 30 tablet 5   • aspirin 81 MG chewable tablet Chew 81 mg daily.     • atorvastatin (LIPITOR) 40 MG tablet Take 40 mg by mouth Every Evening.     • B-D ULTRAFINE III SHORT PEN 31G X 8 MM misc USE AS DIRECTED 4 TIMES DAILY 150 each 2   • Calcium Citrate-Vitamin D (CITRACAL/VITAMIN D) 250-200 MG-UNIT tablet Take 2 tablets by mouth 2 (two) times a day.     • clopidogrel (PLAVIX) 75 MG tablet Take 1 tablet by mouth Daily. 90 tablet 3   • fluticasone (FLONASE) 50 MCG/ACT nasal spray 2 sprays into each nostril Daily for 30 days. 1 each 0   • furosemide (LASIX) 40 MG tablet Take 1 tablet by mouth Daily. 90 tablet 3   • gabapentin (NEURONTIN) 600 MG tablet Take 600 mg by mouth 3 (three) times a day.     • glucagon (GLUCAGON EMERGENCY) 1 MG injection Inject 1 mg under the skin 1 (one) time as needed for low blood sugar.     • hydrochlorothiazide (MICROZIDE) 12.5 MG capsule Take 12.5 mg by mouth Daily.     • HYDROcodone-acetaminophen (NORCO) 7.5-325 MG per tablet Take 1 tablet by mouth Every 4 (Four) Hours As Needed for Moderate Pain (4-6). 180 tablet 0   • insulin aspart (NOVOLOG FLEXPEN) 100 UNIT/ML solution pen-injector sc pen Inject 40 units with meals 30 mL 3   • Insulin Degludec 100 UNIT/ML solution pen-injector Inject 70 Units under the skin Daily. 9 pen 3   • Insulin Syringe 29G X 1/2\" 0.3 ML misc Use 3 times a day 270 each 3   • KETOCARE strip USE 1 STRIP TO CHECK FOR KETONES IF BLOOD SUGAR IS GREATER THAN 250  99   • LORazepam (ATIVAN) 2 MG tablet Take 1 tablet by mouth 2 (Two) Times a Day As Needed for anxiety. 60 tablet 2   • MAGOX 400 400 (241.3 MG) MG tablet tablet TAKE 1 TABLET BY MOUTH TWICE A DAY  12   • metoprolol succinate XL (TOPROL-XL) 25 MG 24 hr tablet " Take 1 tablet by mouth Daily. 31 tablet 13   • mirtazapine (REMERON) 45 MG tablet TAKE 1 TABLET BY MOUTH EVERY DAY AT BEDTIME 30 tablet 5   • nabumetone (RELAFEN) 500 MG tablet Take 1 tablet by mouth 2 (Two) Times a Day As Needed for mild pain (1-3). 60 tablet 5   • ondansetron (ZOFRAN) 8 MG tablet Take 1 tablet by mouth every 8 (eight) hours. 90 tablet 3   • ONE TOUCH ULTRA TEST test strip USE TO TEST SUGAR 4 TIMES A  each 1   • pantoprazole (PROTONIX) 40 MG EC tablet Take 1 tablet by mouth daily. 90 tablet 3   • predniSONE (DELTASONE) 20 MG tablet Take 1 tablet by mouth Daily. 5 tablet 0   • ranolazine (RANEXA) 500 MG 12 hr tablet Take 2 tablets by mouth 2 (Two) Times a Day. 120 tablet 6   • SITagliptin (JANUVIA) 100 MG tablet 100 mg by mouth  daily before breakfast 30 tablet 11   • venlafaxine XR (EFFEXOR-XR) 150 MG 24 hr capsule TAKE ONE CAPSULE BY MOUTH TWICE A DAY FOR DEPRESSION 180 capsule 3   • vitamin D (ERGOCALCIFEROL) 62550 UNITS capsule capsule TAKE ONE CAPSULE BY MOUTH EVERY SUNDAY  2     No facility-administered encounter medications on file as of 4/24/2017.        Orders placed during this encounter include:  Orders Placed This Encounter   Procedures   • Comprehensive Metabolic Panel   • Hemoglobin A1c   • TSH   • Vitamin D 25 Hydroxy   • Lipid Panel   • Protein / Creatinine Ratio, Urine   • Microalbumin / Creatinine Urine Ratio           Glycemic Management                    Toujeo ---patient taking 60- increase to 65 units --decreased to 60 units ( changed to tresiba per insurance)-- increase to 63         ==================        Novolog      Taking 30 units ---taking 35 units      Discussed carb counting but states cannot     She will need to look at her meals because 30 units may not be enough for some meals and for others meals to strong     If you eat a meal and give 30 units and your sugar is 200 or higher before the next meal look back at that meal and the next time you eat it either  give more insulin or eat less     Also if you have a low after the meal give less insulin the next time you eat that meal                  ==================     Jardiance 10 mg tablet , take before breakfast---will stop due to dizziness for possible volume depletion-----the dizziness has resolved since stopping jardiance        ==================     Metformin 500 mg tablets - caused diarrhea      ====================     start bydureon - tolerated but since trouble with gastroparesis, stop            januvia 100 mg daily -- stopped -- restart      ------------------------------     Lipid Management        on Lipitor   on fenofibrate     August 2015     LDL - 122     missed some doses of medication      Feb. 2016     Tg - 241  LDL - 102     missing doses still - please be complaint     May 2016     LDL - 85      Jan. 2017     LDL - 120     Has been taking medication faithfully      LDL needs to be 70 or less     add zetia     Also discussed restarting the jardiance for the heart benefits but refuses        Blood Pressure Management: Control of blood pressure  on ACEi     stopped the lasix   Microvascular Complication Monitoring: Neuropathy type sensorial  neurontin     intermittetly takes reglan for gastroparesis     states eye exam ws 2015  RX for shoes - diabetic neuropathy      Immunization - last influenza vaccine Oct. 2016   Other Diabetes Related Aspects  TSH abnormal from July to Sept 2014     TSH in the 5 to 7 range     confirmed now Jan 2015     start levothyroxine 50 mcgs daily, skip on Sunday     now TSH nl      stopped levothyroxine      TSH - nl     will monitor TSH         August 2015     TSH - nl     FEb. 2016     TSh - 6     she refuses thyroid medicaton      May 2016     TSH - 5     Jan. 2017      TSH - nl      Lab Results   Component Value Date    TSH 4.870 (H) 03/11/2017     Still does not want medication  ---     3-15     B12 low      now b12 shots     June 2015     B12- 968                        4. Follow-up: Return in about 3 months (around 7/24/2017) for Recheck.

## 2017-05-01 ENCOUNTER — OFFICE VISIT (OUTPATIENT)
Dept: PULMONOLOGY | Facility: CLINIC | Age: 57
End: 2017-05-01

## 2017-05-01 VITALS
DIASTOLIC BLOOD PRESSURE: 82 MMHG | HEIGHT: 68 IN | HEART RATE: 114 BPM | WEIGHT: 250 LBS | BODY MASS INDEX: 37.89 KG/M2 | SYSTOLIC BLOOD PRESSURE: 120 MMHG | OXYGEN SATURATION: 99 %

## 2017-05-01 DIAGNOSIS — J30.2 SEASONAL ALLERGIC RHINITIS, UNSPECIFIED ALLERGIC RHINITIS TRIGGER: ICD-10-CM

## 2017-05-01 DIAGNOSIS — J98.4 RESTRICTIVE LUNG DISEASE SECONDARY TO OBESITY: ICD-10-CM

## 2017-05-01 DIAGNOSIS — E66.01 MORBID OBESITY DUE TO EXCESS CALORIES (HCC): ICD-10-CM

## 2017-05-01 DIAGNOSIS — R94.2 ABNORMAL PFTS: Primary | ICD-10-CM

## 2017-05-01 DIAGNOSIS — E66.9 RESTRICTIVE LUNG DISEASE SECONDARY TO OBESITY: ICD-10-CM

## 2017-05-01 PROCEDURE — 99203 OFFICE O/P NEW LOW 30 MIN: CPT | Performed by: INTERNAL MEDICINE

## 2017-05-02 ENCOUNTER — PROCEDURE VISIT (OUTPATIENT)
Dept: CARDIOLOGY | Facility: CLINIC | Age: 57
End: 2017-05-02

## 2017-05-02 DIAGNOSIS — R06.09 DYSPNEA ON EXERTION: ICD-10-CM

## 2017-05-02 PROCEDURE — 94620 PR PULMONARY STRESS TESTING,SIMPLE: CPT | Performed by: INTERNAL MEDICINE

## 2017-05-03 LAB
BH CV ECHO MEAS - EDV(CUBED): 91.1 ML
BH CV ECHO MEAS - EDV(TEICH): 92.4 ML
BH CV ECHO MEAS - EF(CUBED): 70.4 %
BH CV ECHO MEAS - EF(TEICH): 62.1 %
BH CV ECHO MEAS - ESV(CUBED): 27 ML
BH CV ECHO MEAS - ESV(TEICH): 35 ML
BH CV ECHO MEAS - FS: 33.3 %
BH CV ECHO MEAS - IVS/LVPW: 1
BH CV ECHO MEAS - IVSD: 1.3 CM
BH CV ECHO MEAS - LA DIMENSION: 3.4 CM
BH CV ECHO MEAS - LV IVRT: 0.13 SEC
BH CV ECHO MEAS - LV MASS(C)D: 222.6 GRAMS
BH CV ECHO MEAS - LVIDD: 4.5 CM
BH CV ECHO MEAS - LVIDS: 3 CM
BH CV ECHO MEAS - LVPWD: 1.3 CM
BH CV ECHO MEAS - PA MAX PG: 3.8 MMHG
BH CV ECHO MEAS - PA V2 MAX: 97.9 CM/SEC
BH CV ECHO MEAS - RAP SYSTOLE: 5 MMHG
BH CV ECHO MEAS - RVDD: 2.6 CM
BH CV ECHO MEAS - RVSP: 27.7 MMHG
BH CV ECHO MEAS - SV(CUBED): 64.1 ML
BH CV ECHO MEAS - SV(TEICH): 57.4 ML
BH CV ECHO MEAS - TAPSE (>1.6): 1.5 CM2
BH CV ECHO MEAS - TR MAX VEL: 238 CM/SEC
BH CV XLRA - RV BASE: 3.4 CM
BH CV XLRA - RV LENGTH: 7.1 CM
BH CV XLRA - RV MID: 2.9 CM
BH CV XLRA - TDI S': 11.2 CM/SEC

## 2017-05-04 DIAGNOSIS — R60.9 FLUID RETENTION: ICD-10-CM

## 2017-05-04 RX ORDER — FUROSEMIDE 40 MG/1
TABLET ORAL
Qty: 180 TABLET | Refills: 0 | Status: SHIPPED | OUTPATIENT
Start: 2017-05-04 | End: 2017-05-08 | Stop reason: SDUPTHER

## 2017-05-08 ENCOUNTER — OFFICE VISIT (OUTPATIENT)
Dept: FAMILY MEDICINE CLINIC | Facility: CLINIC | Age: 57
End: 2017-05-08

## 2017-05-08 VITALS
DIASTOLIC BLOOD PRESSURE: 68 MMHG | OXYGEN SATURATION: 95 % | BODY MASS INDEX: 39.1 KG/M2 | SYSTOLIC BLOOD PRESSURE: 118 MMHG | WEIGHT: 258 LBS | TEMPERATURE: 97.9 F | HEIGHT: 68 IN | HEART RATE: 76 BPM

## 2017-05-08 DIAGNOSIS — J98.4 RESTRICTIVE LUNG DISEASE SECONDARY TO OBESITY: ICD-10-CM

## 2017-05-08 DIAGNOSIS — E66.9 OBESITY (BMI 30-39.9): ICD-10-CM

## 2017-05-08 DIAGNOSIS — IMO0002 UNCONTROLLED TYPE 2 DIABETES MELLITUS WITH DIABETIC POLYNEUROPATHY, WITH LONG-TERM CURRENT USE OF INSULIN: ICD-10-CM

## 2017-05-08 DIAGNOSIS — R06.83 SNORING: ICD-10-CM

## 2017-05-08 DIAGNOSIS — Z79.891 LONG TERM PRESCRIPTION OPIATE USE: ICD-10-CM

## 2017-05-08 DIAGNOSIS — F41.1 GAD (GENERALIZED ANXIETY DISORDER): ICD-10-CM

## 2017-05-08 DIAGNOSIS — G89.4 CHRONIC PAIN DISORDER: Primary | ICD-10-CM

## 2017-05-08 DIAGNOSIS — E66.9 RESTRICTIVE LUNG DISEASE SECONDARY TO OBESITY: ICD-10-CM

## 2017-05-08 PROBLEM — E66.01 MORBID OBESITY DUE TO EXCESS CALORIES: Status: RESOLVED | Noted: 2017-05-01 | Resolved: 2017-05-08

## 2017-05-08 PROCEDURE — 80307 DRUG TEST PRSMV CHEM ANLYZR: CPT | Performed by: FAMILY MEDICINE

## 2017-05-08 PROCEDURE — 99214 OFFICE O/P EST MOD 30 MIN: CPT | Performed by: FAMILY MEDICINE

## 2017-05-08 RX ORDER — HYDROCODONE BITARTRATE AND ACETAMINOPHEN 7.5; 325 MG/1; MG/1
1 TABLET ORAL EVERY 4 HOURS PRN
Qty: 180 TABLET | Refills: 0 | Status: SHIPPED | OUTPATIENT
Start: 2017-05-08 | End: 2017-06-13 | Stop reason: SDUPTHER

## 2017-05-08 RX ORDER — LORAZEPAM 2 MG/1
2 TABLET ORAL 2 TIMES DAILY PRN
Qty: 60 TABLET | Refills: 2 | Status: SHIPPED | OUTPATIENT
Start: 2017-05-08 | End: 2017-08-08 | Stop reason: SDUPTHER

## 2017-05-12 ENCOUNTER — OFFICE VISIT (OUTPATIENT)
Dept: ENDOCRINOLOGY | Facility: CLINIC | Age: 57
End: 2017-05-12

## 2017-05-12 DIAGNOSIS — IMO0002 UNCONTROLLED TYPE 2 DIABETES MELLITUS WITH COMPLICATION, WITH LONG-TERM CURRENT USE OF INSULIN: Primary | ICD-10-CM

## 2017-05-12 PROCEDURE — PTNOCHG PR CUSTOM PT NO CHARGE VISIT: Performed by: INTERNAL MEDICINE

## 2017-05-23 RX ORDER — FLUTICASONE PROPIONATE 50 MCG
2 SPRAY, SUSPENSION (ML) NASAL DAILY
Qty: 1 EACH | Refills: 0 | Status: SHIPPED | OUTPATIENT
Start: 2017-05-23 | End: 2017-06-15 | Stop reason: SDUPTHER

## 2017-05-24 RX ORDER — FLUTICASONE PROPIONATE 50 MCG
SPRAY, SUSPENSION (ML) NASAL
Qty: 16 ML | Refills: 0 | Status: SHIPPED | OUTPATIENT
Start: 2017-05-24 | End: 2017-08-31

## 2017-06-05 DIAGNOSIS — R60.9 FLUID RETENTION: ICD-10-CM

## 2017-06-05 RX ORDER — FUROSEMIDE 40 MG/1
TABLET ORAL
Qty: 180 TABLET | Refills: 0 | Status: SHIPPED | OUTPATIENT
Start: 2017-06-05 | End: 2017-06-15 | Stop reason: SDUPTHER

## 2017-06-13 DIAGNOSIS — G89.4 CHRONIC PAIN DISORDER: ICD-10-CM

## 2017-06-13 RX ORDER — HYDROCODONE BITARTRATE AND ACETAMINOPHEN 7.5; 325 MG/1; MG/1
1 TABLET ORAL EVERY 4 HOURS PRN
Qty: 180 TABLET | Refills: 0 | Status: SHIPPED | OUTPATIENT
Start: 2017-06-13 | End: 2017-07-10 | Stop reason: SDUPTHER

## 2017-06-15 ENCOUNTER — OFFICE VISIT (OUTPATIENT)
Dept: CARDIOLOGY | Facility: CLINIC | Age: 57
End: 2017-06-15

## 2017-06-15 VITALS
BODY MASS INDEX: 39.71 KG/M2 | OXYGEN SATURATION: 96 % | HEIGHT: 68 IN | SYSTOLIC BLOOD PRESSURE: 106 MMHG | WEIGHT: 262 LBS | HEART RATE: 87 BPM | DIASTOLIC BLOOD PRESSURE: 68 MMHG

## 2017-06-15 DIAGNOSIS — E78.2 MIXED HYPERLIPIDEMIA: ICD-10-CM

## 2017-06-15 DIAGNOSIS — R06.09 DYSPNEA ON EXERTION: Primary | ICD-10-CM

## 2017-06-15 DIAGNOSIS — I10 ESSENTIAL HYPERTENSION: ICD-10-CM

## 2017-06-15 DIAGNOSIS — I25.10 CORONARY ARTERY DISEASE INVOLVING NATIVE CORONARY ARTERY OF NATIVE HEART WITHOUT ANGINA PECTORIS: ICD-10-CM

## 2017-06-15 PROCEDURE — 99215 OFFICE O/P EST HI 40 MIN: CPT | Performed by: INTERNAL MEDICINE

## 2017-06-15 NOTE — PROGRESS NOTES
Cardiovascular Medicine   Can Kwon M.D., Ph.D., [facility]    Thank you for asking me to see Ariana Miord for establishing care.    Hypertension   Associated symptoms include chest pain.   Hyperlipidemia   Associated symptoms include chest pain.   Coronary Artery Disease   Symptoms include chest pain. Risk factors include hyperlipidemia.   Chest Pain      Her past medical history is significant for CAD and hyperlipidemia.     This is a 57 y.o. female with:  1. MV ASCAD  A. CABG, 2005  -LIMA-LAD: Prior PCI 2.5 x 28mm in 2004  -RCA, 60%  B. LHC, 6/2016  -pLAD: 50%  -Ostial D1-30%  -mLAD: 100%  -dLAD fills via LIMA  -OM1: 60%  -FFR was 0.93  2. HTN  3. HLD  4. DM2  5. Mild MR/TR    ASCAD  The pt was diagnosed with CAD in 2004 and underwent PCI to LAD. She had subsequent restenosis and went for a LIMA to LAD in 2005. She  Had recurrent pain in summer of 2016. Repeat LHC revealed patent LIMA and a 60% OM1 lesion with a FFR of 0.93. She has been maintained on OMT. She is currently on ASA, Plavix, Coreg and Lipitor.  At her last visit she had been admitted for chest pain.  I started Ranexa.  She's been admitted again for an infection that actually caused hypotension.  Some of her blood pressure medications initially had to be held.  She presents as a hospital discharge follow-up today.     Dyspnea  She has stable dyspnea. She does have DD and LVH. She is having essentially class II symptoms. No LE edema. No smoking history. She has had significant secondhand smoke.     HTN  Concerning the patient's hypertension, the patient is currently taking Lisinopril and HCTZ.  She has continued with some exertional dizziness, but no syncope.     HLD  Concerning the patient's hyperlipidemia, the patient remains on a statin.  They are tolerating this very well.  Denies side effects.  Specifically, the patient denies any prior history of asymptomatic LFT elevation, myositis or myalgias.  Patient's laboratory evaluations are  followed closely by their PCP.    Mild MR  Her most recent echocardiogram in April of this year showed mild MR.      Review of Systems - History obtained from chart review and the patient  General ROS: negative  Respiratory ROS: positive for - chest pain    family history includes Diabetes in her other; Heart disease in her other; Hypertension in her other.     reports that she has never smoked. She has never used smokeless tobacco. She reports that she does not drink alcohol or use illicit drugs.    Allergies   Allergen Reactions   • Seroquel [Quetiapine Fumarate] Anaphylaxis   • Avandia [Rosiglitazone] Swelling   • Morphine And Related Hallucinations     If patient takes to much         Current Outpatient Prescriptions:   •  Albiglutide (TANZEUM) 30 MG pen-injector, Inject 30 mg under the skin 1 (One) Time Per Week., Disp: 1 each, Rfl: 12  •  ARIPiprazole (ABILIFY) 15 MG tablet, TAKE 1 TABLET BY MOUTH DAILY., Disp: 30 tablet, Rfl: 5  •  aspirin 81 MG chewable tablet, Chew 81 mg daily., Disp: , Rfl:   •  atorvastatin (LIPITOR) 40 MG tablet, Take 40 mg by mouth Every Evening., Disp: , Rfl:   •  B-D ULTRAFINE III SHORT PEN 31G X 8 MM misc, USE AS DIRECTED 4 TIMES DAILY, Disp: 150 each, Rfl: 2  •  Calcium Citrate-Vitamin D (CITRACAL/VITAMIN D) 250-200 MG-UNIT tablet, Take 2 tablets by mouth 2 (two) times a day., Disp: , Rfl:   •  clopidogrel (PLAVIX) 75 MG tablet, Take 1 tablet by mouth Daily., Disp: 90 tablet, Rfl: 3  •  fluticasone (FLONASE) 50 MCG/ACT nasal spray, PUT 2 SPRAYS INTO EACH NOSTRIL DAILY, Disp: 16 mL, Rfl: 0  •  fluticasone (FLONASE) 50 MCG/ACT nasal spray, 2 sprays into each nostril Daily for 30 days., Disp: 1 each, Rfl: 0  •  furosemide (LASIX) 40 MG tablet, Take 1 tablet by mouth Daily., Disp: 90 tablet, Rfl: 3  •  furosemide (LASIX) 40 MG tablet, TAKE 1 TABLET BY MOUTH DAILY., Disp: 180 tablet, Rfl: 0  •  gabapentin (NEURONTIN) 600 MG tablet, Take 600 mg by mouth 3 (three) times a day., Disp: ,  "Rfl:   •  glucagon (GLUCAGON EMERGENCY) 1 MG injection, Inject 1 mg under the skin 1 (one) time as needed for low blood sugar., Disp: , Rfl:   •  hydrochlorothiazide (MICROZIDE) 12.5 MG capsule, Take 12.5 mg by mouth Daily., Disp: , Rfl:   •  HYDROcodone-acetaminophen (NORCO) 7.5-325 MG per tablet, Take 1 tablet by mouth Every 4 (Four) Hours As Needed for Moderate Pain (4-6)., Disp: 180 tablet, Rfl: 0  •  insulin aspart (NOVOLOG FLEXPEN) 100 UNIT/ML solution pen-injector sc pen, Inject 40 units with meals, Disp: 30 mL, Rfl: 3  •  Insulin Degludec 100 UNIT/ML solution pen-injector, Inject 70 Units under the skin Daily., Disp: 9 pen, Rfl: 3  •  Insulin Degludec 200 UNIT/ML solution pen-injector, Inject 100 Units under the skin Daily., Disp: 6 pen, Rfl: 11  •  Insulin Syringe 29G X 1/2\" 0.3 ML misc, Use 3 times a day, Disp: 270 each, Rfl: 3  •  KETOCARE strip, USE 1 STRIP TO CHECK FOR KETONES IF BLOOD SUGAR IS GREATER THAN 250, Disp: , Rfl: 99  •  LORazepam (ATIVAN) 2 MG tablet, Take 1 tablet by mouth 2 (Two) Times a Day As Needed for Anxiety., Disp: 60 tablet, Rfl: 2  •  MAGOX 400 400 (241.3 MG) MG tablet tablet, TAKE 1 TABLET BY MOUTH TWICE A DAY, Disp: , Rfl: 12  •  metoprolol succinate XL (TOPROL-XL) 25 MG 24 hr tablet, Take 1 tablet by mouth Daily., Disp: 31 tablet, Rfl: 13  •  mirtazapine (REMERON) 45 MG tablet, TAKE 1 TABLET BY MOUTH EVERY DAY AT BEDTIME, Disp: 30 tablet, Rfl: 5  •  nabumetone (RELAFEN) 500 MG tablet, Take 1 tablet by mouth 2 (Two) Times a Day As Needed for mild pain (1-3)., Disp: 60 tablet, Rfl: 5  •  ondansetron (ZOFRAN) 8 MG tablet, Take 1 tablet by mouth every 8 (eight) hours., Disp: 90 tablet, Rfl: 3  •  ONE TOUCH ULTRA TEST test strip, USE TO TEST SUGAR 4 TIMES A DAY, Disp: 400 each, Rfl: 1  •  pantoprazole (PROTONIX) 40 MG EC tablet, Take 1 tablet by mouth daily., Disp: 90 tablet, Rfl: 3  •  ranolazine (RANEXA) 500 MG 12 hr tablet, Take 2 tablets by mouth 2 (Two) Times a Day., Disp: 120 " tablet, Rfl: 6  •  venlafaxine XR (EFFEXOR-XR) 150 MG 24 hr capsule, TAKE ONE CAPSULE BY MOUTH TWICE A DAY FOR DEPRESSION, Disp: 180 capsule, Rfl: 3  •  vitamin D (ERGOCALCIFEROL) 68317 UNITS capsule capsule, TAKE ONE CAPSULE BY MOUTH EVERY SUNDAY, Disp: 12 capsule, Rfl: 2    Physical Exam:  There were no vitals filed for this visit.  There is no height or weight on file to calculate BMI.    GEN: alert, appears stated age and cooperative  Body Habitus: well-nourished  Neuro: CN II-XII grossly intact.   HEENT: Head: Normocephalic, no lesions, without obvious abnormality.  Neck / Thyroid: Supple, no masses, nodes, nodules or enlargement. No arcus senilis, xanthelasma or xanthomas. PERRL. Normal external ears. No drainage. No thyromegaly. Neck supple. No LAD. Trachea midline. Nose, normal.  JVP: 6 cm of water at 45 degrees HJR: absent      Carotid:  Upstroke: easily palpated bilaterally Volume: Normal.    Carotid Bruit:  None  Subclavian Bruit: Not present.    Lymph: No overt LAD.   Back: Normal.  Chest:  Normal Excursion: Good    I:E: Good  Pulmonary:clear to auscultation, no wheezes, rales or rhonchi, symmetric air entry. Equal chest excursion. Chest physical exam is normal. No tenderness.        Precordium:  No palpable heaves or thrusts. P2 is not palpable.   [place]:  normal size and placement Palpable S4: Not present.   Heart rate: normal  Heart Rhythm: regular     Heart Sounds: S1: normal intensity  S2: normal intensity  S3: absent   S4: absent  Opening Snap: absent  A2-OS:  N/A  Pericardial rub: absent    Ejection click: None      Murmurs: Systolic: none  Diastolic: none  Abdomen: Soft, non-tender, normal bowel sounds; no bruits, organomegaly or masses.  Extremity: no edema, cyanosis  Pulses: Right radial artery has 2+ (normal) pulse and Left radial artery has 2+ (normal) pulse    DATA REVIEWED:   · The study is technically difficult for diagnosis. The quality of the study is limited due to poor acoustic windows  related to patient body habitus  · Left Ventricle: Left ventricular function is normal. Calculated EF = 57%.  · Left ventricular wall thickness is consistent with mild concentric hypertrophy  · Left ventricular diastolic dysfunction is noted (grade II w/high LAP) consistent with pseudonormalization. Elevated left atrial pressure.  · Right Ventricle: Normal cavity size, wall thickness, systolic function and septal motion noted  · Inferior vena cava not well visualized  · Mild mitral valve regurgitation is present  · PASP is estimated between 37 and 53 mmHg. Unable to evaluate right-sided filling pressures. Unable to exclude pulmonary hypertension.  · There is no evidence of pericardial effusion.        [facility], as above    TTE, 11/2016:  Preserved LVEF    Assessment/Plan      1. ASCAD. EF: 50%  last documented 11/2016. CCS class I. The patient has been revascularized with LIMA to LAD. The patient has incomplete revascularization. Anatomy with significant obstruction: circumflex.  ASA, Plavix, Atorvastatin  Ranexa, Change Coreg to Toprol XL for marginal HDs    2. HTN, essential.  BP noted to be well controlled today in office.   DASH; medication compliance; Low sodium diet  D/C Lisinopril and continue HCTZ     3. Dyspnea. Etiology is likely multifactorial. Functional class is: Class 2 - comfortable at rest but have symptoms with ordinary physcial activity..There are no signs of HF on examination. The patient does have risk factors for HF. She had some BNPs that were mildly elevated, but not markedly so. I would recommend a thorough pulmonary evaluation prior to evaluation for HFpEF. Additionally, I was unable to see her IVC on her most recent TTE, so her PASP is between 37 and 53mmHg. Will repeat this prior to her next appt.   -Sleep referral to evaluate for CHRISTOPHRE  -Will obtain full PFTs, 6MWT  -Pulmonary referral: Dr. Gamboa    4. Cardiac Risk Assessment: Known CAD  Recommended ASA/Statin    4. Dyslipidemia.  Under good  control.  -Statin:  Yes.  -Recommended moderate-intensity statin therapy  -Lipid-lowering medications: Lipitor (atorvastatin)    5. Mild MR/TR. NYHA stage B; FC-I.   We'll plan on echocardiogram April 2020    7. Tobacco status: non-smoker     Plan for follow-up: in 2 months      EMR Dragon/Transcription disclaimer:     Much of this encounter note is an electronic transcription. This may permit erroneous, or at times, nonsensical words or phrases to be inadvertently transcribed; Although I have reviewed the note for such errors, some may still exist.

## 2017-06-15 NOTE — PROGRESS NOTES
Cardiovascular Medicine   Can Kwon M.D., Ph.D., [facility]    This is a 57 y.o. female with:  1. MV ASCAD  A. CABG, 2005  -LIMA-LAD: Prior PCI 2.5 x 28mm in 2004  -RCA, 60%  B. [facility], 6/2016  -pLAD: 50%  -Ostial D1-30%  -mLAD: 100%  -dLAD fills via LIMA  -OM1: 60%  -FFR was 0.93  2. HTN  3. HLD  4. DM2  5. Mild MR/TR    ASCAD  The pt was diagnosed with CAD in 2004 and underwent PCI to LAD. She had subsequent restenosis and went for a LIMA to LAD in 2005. She  Had recurrent pain in summer of 2016. Repeat LHC revealed patent LIMA and a 60% OM1 lesion with a FFR of 0.93. She has been maintained on OMT. She is currently on ASA, Plavix, Coreg and Lipitor.      Dyspnea  She has stable dyspnea. She does have DD and LVH. She is having essentially class II symptoms. No LE edema. No smoking history. She has had significant secondhand smoke.     HLD  Concerning the patient's hyperlipidemia, the patient remains on a statin.  They are tolerating this very well.  Denies side effects.  Specifically, the patient denies any prior history of asymptomatic LFT elevation, myositis or myalgias.  Patient's laboratory evaluations are followed closely by their PCP.    Mild MR  Her most recent echocardiogram in April of this year showed mild MR.      Review of Systems - History obtained from chart review and the patient  General ROS: negative  Respiratory ROS: positive for - chest pain    family history includes Diabetes in her other; Heart disease in her other; Hypertension in her other.     reports that she has never smoked. She has never used smokeless tobacco. She reports that she does not drink alcohol or use illicit drugs.    Allergies   Allergen Reactions   • Seroquel [Quetiapine Fumarate] Anaphylaxis   • Avandia [Rosiglitazone] Swelling   • Morphine And Related Hallucinations     If patient takes to much       Current Outpatient Prescriptions:   •  ARIPiprazole (ABILIFY) 15 MG tablet, TAKE 1 TABLET BY MOUTH DAILY., Disp: 30  "tablet, Rfl: 5  •  aspirin 81 MG chewable tablet, Chew 81 mg daily., Disp: , Rfl:   •  atorvastatin (LIPITOR) 40 MG tablet, Take 40 mg by mouth Every Evening., Disp: , Rfl:   •  B-D ULTRAFINE III SHORT PEN 31G X 8 MM misc, USE AS DIRECTED 4 TIMES DAILY, Disp: 150 each, Rfl: 2  •  Calcium Citrate-Vitamin D (CITRACAL/VITAMIN D) 250-200 MG-UNIT tablet, Take 2 tablets by mouth 2 (two) times a day., Disp: , Rfl:   •  clopidogrel (PLAVIX) 75 MG tablet, Take 1 tablet by mouth Daily., Disp: 90 tablet, Rfl: 3  •  fluticasone (FLONASE) 50 MCG/ACT nasal spray, PUT 2 SPRAYS INTO EACH NOSTRIL DAILY, Disp: 16 mL, Rfl: 0  •  furosemide (LASIX) 40 MG tablet, Take 1 tablet by mouth Daily., Disp: 90 tablet, Rfl: 3  •  gabapentin (NEURONTIN) 600 MG tablet, Take 600 mg by mouth 3 (three) times a day., Disp: , Rfl:   •  glucagon (GLUCAGON EMERGENCY) 1 MG injection, Inject 1 mg under the skin 1 (one) time as needed for low blood sugar., Disp: , Rfl:   •  hydrochlorothiazide (MICROZIDE) 12.5 MG capsule, Take 12.5 mg by mouth Daily., Disp: , Rfl:   •  HYDROcodone-acetaminophen (NORCO) 7.5-325 MG per tablet, Take 1 tablet by mouth Every 4 (Four) Hours As Needed for Moderate Pain (4-6)., Disp: 180 tablet, Rfl: 0  •  insulin aspart (NOVOLOG FLEXPEN) 100 UNIT/ML solution pen-injector sc pen, Inject 40 units with meals, Disp: 30 mL, Rfl: 3  •  Insulin Degludec 200 UNIT/ML solution pen-injector, Inject 100 Units under the skin Daily., Disp: 6 pen, Rfl: 11  •  Insulin Syringe 29G X 1/2\" 0.3 ML misc, Use 3 times a day, Disp: 270 each, Rfl: 3  •  KETOCARE strip, USE 1 STRIP TO CHECK FOR KETONES IF BLOOD SUGAR IS GREATER THAN 250, Disp: , Rfl: 99  •  LORazepam (ATIVAN) 2 MG tablet, Take 1 tablet by mouth 2 (Two) Times a Day As Needed for Anxiety., Disp: 60 tablet, Rfl: 2  •  MAGOX 400 400 (241.3 MG) MG tablet tablet, TAKE 1 TABLET BY MOUTH TWICE A DAY, Disp: , Rfl: 12  •  metoprolol succinate XL (TOPROL-XL) 25 MG 24 hr tablet, Take 1 tablet by mouth " Daily., Disp: 31 tablet, Rfl: 13  •  mirtazapine (REMERON) 45 MG tablet, TAKE 1 TABLET BY MOUTH EVERY DAY AT BEDTIME, Disp: 30 tablet, Rfl: 5  •  nabumetone (RELAFEN) 500 MG tablet, Take 1 tablet by mouth 2 (Two) Times a Day As Needed for mild pain (1-3)., Disp: 60 tablet, Rfl: 5  •  ondansetron (ZOFRAN) 8 MG tablet, Take 1 tablet by mouth every 8 (eight) hours., Disp: 90 tablet, Rfl: 3  •  ONE TOUCH ULTRA TEST test strip, USE TO TEST SUGAR 4 TIMES A DAY, Disp: 400 each, Rfl: 1  •  pantoprazole (PROTONIX) 40 MG EC tablet, Take 1 tablet by mouth daily., Disp: 90 tablet, Rfl: 3  •  ranolazine (RANEXA) 500 MG 12 hr tablet, Take 2 tablets by mouth 2 (Two) Times a Day., Disp: 120 tablet, Rfl: 6  •  venlafaxine XR (EFFEXOR-XR) 150 MG 24 hr capsule, TAKE ONE CAPSULE BY MOUTH TWICE A DAY FOR DEPRESSION, Disp: 180 capsule, Rfl: 3  •  vitamin D (ERGOCALCIFEROL) 95412 UNITS capsule capsule, TAKE ONE CAPSULE BY MOUTH EVERY SUNDAY, Disp: 12 capsule, Rfl: 2        Physical Exam:  Vitals:    06/15/17 1444   BP: 106/68   Pulse: 87   SpO2: 96%       GEN: alert, appears stated age and cooperative  Body Habitus: well-nourished  Neuro: CN II-XII grossly intact.   HEENT: Head: Normocephalic, no lesions, without obvious abnormality.  Neck / Thyroid: Supple, no masses, nodes, nodules or enlargement. No arcus senilis, xanthelasma or xanthomas. PERRL. Normal external ears. No drainage. No thyromegaly. Neck supple. No LAD. Trachea midline. Nose, normal.  JVP: 6 cm of water at 45 degrees HJR: absent      Carotid:  Upstroke: easily palpated bilaterally Volume: Normal.    Carotid Bruit:  None  Subclavian Bruit: Not present.    Lymph: No overt LAD.   Back: Normal.  Chest:  Normal Excursion: Good    I:E: Good  Pulmonary:clear to auscultation, no wheezes, rales or rhonchi, symmetric air entry. Equal chest excursion. Chest physical exam is normal. No tenderness.        Precordium:  No palpable heaves or thrusts. P2 is not palpable.   [place]:  normal  size and placement Palpable S4: Not present.   Heart rate: normal  Heart Rhythm: regular     Heart Sounds: S1: normal intensity  S2: normal intensity  S3: absent   S4: absent  Opening Snap: absent  A2-OS:  N/A  Pericardial rub: absent    Ejection click: None      Murmurs: Systolic: none  Diastolic: none  Abdomen: Soft, non-tender, normal bowel sounds; no bruits, organomegaly or masses.  Extremity: no edema, cyanosis  Pulses: Right radial artery has 2+ (normal) pulse and Left radial artery has 2+ (normal) pulse    DATA REVIEWED:   · The study is technically difficult for diagnosis. The quality of the study is limited due to poor acoustic windows related to patient body habitus  · Left Ventricle: Left ventricular function is normal. Calculated EF = 57%.  · Left ventricular wall thickness is consistent with mild concentric hypertrophy  · Left ventricular diastolic dysfunction is noted (grade II w/high LAP) consistent with pseudonormalization. Elevated left atrial pressure.  · Right Ventricle: Normal cavity size, wall thickness, systolic function and septal motion noted  · Inferior vena cava not well visualized  · Mild mitral valve regurgitation is present  · PASP is estimated between 37 and 53 mmHg. Unable to evaluate right-sided filling pressures. Unable to exclude pulmonary hypertension.  · There is no evidence of pericardial effusion.      · Limited 2D echocardiogram. PAH-protocol  · Estimated EF appears to be in the range of 61 - 65%. Normal left ventricular cavity size noted  · Right Ventricle: Normal right ventricular cavity size, wall thickness and septal motion noted  · Mildly reduced right ventricular systolic function noted. RV S' velocity is 11/2 cm/sec with a TAPSE of 1.5 with an estimated RVEF of 40-45%  · RV Strain = 15%.  · Estimated right ventricular systolic pressure from tricuspid regurgitation is normal (<35 mmHg).  · No evidence of pulmonary hypertension is present.  · There is no evidence of  pericardial effusion.      [facility], as above    TTE, 11/2016:  Preserved LVEF    Assessment/Plan      1. ASCAD. EF: 50%  last documented 11/2016. CCS class I. The patient has been revascularized with LIMA to LAD. The patient has incomplete revascularization. Anatomy with significant obstruction: circumflex.  ASA, Plavix, Atorvastatin  Ranexa, Change Coreg to Toprol XL for marginal HDs    2. Dyspnea. Etiology is likely multifactorial. Functional class is: Class 2 - comfortable at rest but have symptoms with ordinary physcial activity.  I am concerned she's developed HFpEF.  Some of her BNPs were elevated.  The indications, risks and benefits of diagnostic right  heart cardiac catheterization, angiography, conscious sedation, and possible blood transfusion were discussed in detail with the patient. The increased risks that are present with pulmonary arterial hypertension with right heart catheterization were emphasized. I have cited a complication rate of 1.1% with total adverse events. This includes a 0.3% risk of inpatient admission due to a complication. I have cited a 0.5% risk of fatality. This includes the risk of PA perforation. I have cited a risk of hematoma, vagal reactions and pneumothorax as <1%. Pulmonary vasoreactivity testing, if indicated, can cause hypotension, bronchospasm, chest pain and SOB. Pulmonary angiography, if indicated, can rarely cause bronchospasm and one reported death has been cited due to intrapulmonary hemorrhage. I have also discussed the possible risks, though low, of heart block and the need for emergency venous pacing. Additionally, I went over that if a rare complication requires a thoracotomy or median sternotomy that this would be performed emergently by CTS. The patient is willing to proceed. ASA class is ASA 3 - Patient with moderate systemic disease with functional limitations; Mallampati is II (hard and soft palate, upper portion of tonsils anduvula visible).  -RHC, RUE access,  possible VD challenge  -Continue Lasix, HCTZ and Toprol-XL    3. Cardiac Risk Assessment: Known CAD  Recommended ASA/Statin    4. Dyslipidemia.  Under good control.  -Statin:  Yes.  -Recommended moderate-intensity statin therapy  -Lipid-lowering medications: Lipitor (atorvastatin)    5. Mild MR/TR. NYHA stage B; FC-I.   We'll plan on echocardiogram April 2020    6. Tobacco status: non-smoker     Plan for follow-up: in 3 months

## 2017-06-15 NOTE — PATIENT INSTRUCTIONS
Pulmonary Artery Catheterization  Pulmonary artery catheterization is a procedure that is used to test blood movement through the heart and to monitor the heart's function. In this procedure, a thin, flexible tube (catheter) is passed into the right side of the heart and into the main artery that carries blood from your heart to your lungs (pulmonary artery). The procedure may be used to evaluate or help diagnose various problems, such as:  · Heart failure.  · Shock.  · Leaky heart valves (valvular regurgitation).  · Congenital heart disease.  · Burns.  · Kidney disease.  · High blood pressure within the arteries in the lungs (pulmonary hypertension).  · A buildup of fluid around the heart that prevents the heart from functioning normally (cardiac tamponade).  · A disease that causes the heart muscle to become rigid (restrictive cardiomyopathy).  · Abnormal blood flow between two areas of the heart (shunt).  After a heart attack, this procedure may be used to monitor for further problems and to see if medicines are working.  The procedure may be done in a cardiac catheterization lab or in an intensive care unit (ICU).  LET YOUR HEALTH CARE PROVIDER KNOW ABOUT:  · Any allergies you have.  · All medicines you are taking, including vitamins, herbs, eye drops, creams, and over-the-counter medicines.  · Previous problems you or members of your family have had with the use of anesthetics.  · Any blood disorders you have.  · Previous surgeries you have had.  · Any medical conditions you may have.  RISKS AND COMPLICATIONS  Generally, this is a safe procedure. However, problems may occur, including:  · Bruising or bleeding at the catheter insertion site.  · Injury to the vein where the catheter was inserted.  · Puncture to the lung. This is a risk if neck or chest veins are used.  The following problems may also occur, but they are very rare:  · Abnormal heart rhythms.  · Low blood pressure.  · Infection.  · Cardiac  tamponade.  · Blocked blood vessel caused by a blood clot or foreign material circulating in the blood (embolism). This can be caused by blood clots at the tip of the catheter.  BEFORE THE PROCEDURE  · Follow instructions from your health care provider about eating or drinking restrictions.  · Ask your health care provider about:    Changing or stopping your regular medicines. This is especially important if you are taking diabetes medicines or blood thinners.    Taking medicines such as aspirin and ibuprofen. These medicines can thin your blood. Do not take these medicines before your procedure if your health care provider instructs you not to.  PROCEDURE  · An IV tube will be inserted into one of your veins.  · You may be given a medicine that helps you relax (sedative).  · The area of your body that is chosen for insertion of the catheter will be cleaned. This is usually the neck or groin, but the insertion is sometimes done in another area.    You will be given a medicine that numbs this area (local anesthetic).    A small incision will be made in a vein in this area.  · A catheter will be inserted through the incision and into the vein. The health care provider will carefully move the catheter into the upper chamber of the heart (right atrium). X-rays may be used to help guide the catheter to the right place.  · The catheter will be threaded through two heart valves (tricuspid valve and pulmonary valve) and placed into the pulmonary artery.  · As soon as the catheter is in place, the blood pressure in the pulmonary artery will be measured.  · During the procedure, your heart's rhythm will be watched constantly using an electrocardiogram (ECG).  · The catheter will be removed after tests and monitoring have been completed.  The procedure may vary among health care providers and hospitals.  AFTER THE PROCEDURE  · Your blood pressure, heart rate, breathing rate, and blood oxygen level will be monitored often until  the medicines you were given have worn off.     This information is not intended to replace advice given to you by your health care provider. Make sure you discuss any questions you have with your health care provider.     Document Released: 04/22/2008 Document Revised: 05/03/2016 Document Reviewed: 12/22/2015  Elsevier Interactive Patient Education ©2017 [employer]

## 2017-06-20 ENCOUNTER — HOSPITAL ENCOUNTER (OUTPATIENT)
Facility: HOSPITAL | Age: 57
Setting detail: HOSPITAL OUTPATIENT SURGERY
Discharge: HOME | End: 2017-06-20
Attending: INTERNAL MEDICINE | Admitting: INTERNAL MEDICINE

## 2017-06-20 VITALS
BODY MASS INDEX: 39.13 KG/M2 | RESPIRATION RATE: 18 BRPM | HEART RATE: 77 BPM | SYSTOLIC BLOOD PRESSURE: 113 MMHG | DIASTOLIC BLOOD PRESSURE: 60 MMHG | OXYGEN SATURATION: 95 % | HEIGHT: 68 IN | TEMPERATURE: 97.1 F | WEIGHT: 258.16 LBS

## 2017-06-20 DIAGNOSIS — R06.09 DYSPNEA ON EXERTION: ICD-10-CM

## 2017-06-20 PROCEDURE — C1894 INTRO/SHEATH, NON-LASER: HCPCS | Performed by: INTERNAL MEDICINE

## 2017-06-20 PROCEDURE — 93451 RIGHT HEART CATH: CPT | Performed by: INTERNAL MEDICINE

## 2017-06-20 PROCEDURE — 25010000002 ONDANSETRON PER 1 MG: Performed by: INTERNAL MEDICINE

## 2017-06-20 PROCEDURE — 25010000002 FENTANYL CITRATE (PF) 100 MCG/2ML SOLUTION: Performed by: INTERNAL MEDICINE

## 2017-06-20 PROCEDURE — 25010000002 MIDAZOLAM PER 1 MG: Performed by: INTERNAL MEDICINE

## 2017-06-20 RX ORDER — FENTANYL CITRATE 50 UG/ML
INJECTION, SOLUTION INTRAMUSCULAR; INTRAVENOUS AS NEEDED
Status: DISCONTINUED | OUTPATIENT
Start: 2017-06-20 | End: 2017-06-20 | Stop reason: HOSPADM

## 2017-06-20 RX ORDER — MIDAZOLAM HYDROCHLORIDE 1 MG/ML
INJECTION INTRAMUSCULAR; INTRAVENOUS AS NEEDED
Status: DISCONTINUED | OUTPATIENT
Start: 2017-06-20 | End: 2017-06-20 | Stop reason: HOSPADM

## 2017-06-20 RX ORDER — CHLORTHALIDONE 25 MG/1
25 TABLET ORAL DAILY
Qty: 31 TABLET | Refills: 3 | Status: SHIPPED | OUTPATIENT
Start: 2017-06-20 | End: 2017-09-15

## 2017-06-20 RX ORDER — LIDOCAINE HYDROCHLORIDE 20 MG/ML
INJECTION, SOLUTION INFILTRATION; PERINEURAL AS NEEDED
Status: DISCONTINUED | OUTPATIENT
Start: 2017-06-20 | End: 2017-06-20 | Stop reason: HOSPADM

## 2017-06-20 RX ORDER — SODIUM CHLORIDE 0.9 % (FLUSH) 0.9 %
1-10 SYRINGE (ML) INJECTION AS NEEDED
Status: CANCELLED | OUTPATIENT
Start: 2017-06-20

## 2017-06-20 RX ORDER — ONDANSETRON 2 MG/ML
INJECTION INTRAMUSCULAR; INTRAVENOUS AS NEEDED
Status: DISCONTINUED | OUTPATIENT
Start: 2017-06-20 | End: 2017-06-20 | Stop reason: HOSPADM

## 2017-06-20 NOTE — DISCHARGE INSTRUCTIONS
Minor Surgery  Outpatient Instructions    General Information  You have had a minor surgical procedure and are not expected to require extensive treatment or a long recovery period.  However, the following information/instructions that are listed serve as guidelines to help you recover at home.    Activity:  As tolerated    Hygiene:  Shower daily    Dressing/Wound Care:  Remove neck bandaid tomorrow then may shower    Diet:  As tolerated    Important Points:  -Call your doctor to report any of the following:   -unusual or excessive bleeding/drainage   -pain not reduced/controlled by medication   -elevated temperature consistently greater that 100 degrees F   -increased swelling, redness, bruising, or tenderness in surgical area  -Take medications as prescribed by your physician  -If you have any questions, please contact your doctor or the Same Day Surgery Unit at   [phone]  -If a post-operative emergency occurs, report to your nearest ER

## 2017-06-20 NOTE — H&P (VIEW-ONLY)
Cardiovascular Medicine   Can Kwon M.D., Ph.D., [facility]    Thank you for asking me to see Ariana Miord for establishing care.    Hypertension   Associated symptoms include chest pain.   Hyperlipidemia   Associated symptoms include chest pain.   Coronary Artery Disease   Symptoms include chest pain. Risk factors include hyperlipidemia.   Chest Pain      Her past medical history is significant for CAD and hyperlipidemia.     This is a 57 y.o. female with:  1. MV ASCAD  A. CABG, 2005  -LIMA-LAD: Prior PCI 2.5 x 28mm in 2004  -RCA, 60%  B. LHC, 6/2016  -pLAD: 50%  -Ostial D1-30%  -mLAD: 100%  -dLAD fills via LIMA  -OM1: 60%  -FFR was 0.93  2. HTN  3. HLD  4. DM2  5. Mild MR/TR    ASCAD  The pt was diagnosed with CAD in 2004 and underwent PCI to LAD. She had subsequent restenosis and went for a LIMA to LAD in 2005. She  Had recurrent pain in summer of 2016. Repeat LHC revealed patent LIMA and a 60% OM1 lesion with a FFR of 0.93. She has been maintained on OMT. She is currently on ASA, Plavix, Coreg and Lipitor.  At her last visit she had been admitted for chest pain.  I started Ranexa.  She's been admitted again for an infection that actually caused hypotension.  Some of her blood pressure medications initially had to be held.  She presents as a hospital discharge follow-up today.     Dyspnea  She has stable dyspnea. She does have DD and LVH. She is having essentially class II symptoms. No LE edema. No smoking history. She has had significant secondhand smoke.     HTN  Concerning the patient's hypertension, the patient is currently taking Lisinopril and HCTZ.  She has continued with some exertional dizziness, but no syncope.     HLD  Concerning the patient's hyperlipidemia, the patient remains on a statin.  They are tolerating this very well.  Denies side effects.  Specifically, the patient denies any prior history of asymptomatic LFT elevation, myositis or myalgias.  Patient's laboratory evaluations are  followed closely by their PCP.    Mild MR  Her most recent echocardiogram in April of this year showed mild MR.      Review of Systems - History obtained from chart review and the patient  General ROS: negative  Respiratory ROS: positive for - chest pain    family history includes Diabetes in her other; Heart disease in her other; Hypertension in her other.     reports that she has never smoked. She has never used smokeless tobacco. She reports that she does not drink alcohol or use illicit drugs.    Allergies   Allergen Reactions   • Seroquel [Quetiapine Fumarate] Anaphylaxis   • Avandia [Rosiglitazone] Swelling   • Morphine And Related Hallucinations     If patient takes to much         Current Outpatient Prescriptions:   •  Albiglutide (TANZEUM) 30 MG pen-injector, Inject 30 mg under the skin 1 (One) Time Per Week., Disp: 1 each, Rfl: 12  •  ARIPiprazole (ABILIFY) 15 MG tablet, TAKE 1 TABLET BY MOUTH DAILY., Disp: 30 tablet, Rfl: 5  •  aspirin 81 MG chewable tablet, Chew 81 mg daily., Disp: , Rfl:   •  atorvastatin (LIPITOR) 40 MG tablet, Take 40 mg by mouth Every Evening., Disp: , Rfl:   •  B-D ULTRAFINE III SHORT PEN 31G X 8 MM misc, USE AS DIRECTED 4 TIMES DAILY, Disp: 150 each, Rfl: 2  •  Calcium Citrate-Vitamin D (CITRACAL/VITAMIN D) 250-200 MG-UNIT tablet, Take 2 tablets by mouth 2 (two) times a day., Disp: , Rfl:   •  clopidogrel (PLAVIX) 75 MG tablet, Take 1 tablet by mouth Daily., Disp: 90 tablet, Rfl: 3  •  fluticasone (FLONASE) 50 MCG/ACT nasal spray, PUT 2 SPRAYS INTO EACH NOSTRIL DAILY, Disp: 16 mL, Rfl: 0  •  fluticasone (FLONASE) 50 MCG/ACT nasal spray, 2 sprays into each nostril Daily for 30 days., Disp: 1 each, Rfl: 0  •  furosemide (LASIX) 40 MG tablet, Take 1 tablet by mouth Daily., Disp: 90 tablet, Rfl: 3  •  furosemide (LASIX) 40 MG tablet, TAKE 1 TABLET BY MOUTH DAILY., Disp: 180 tablet, Rfl: 0  •  gabapentin (NEURONTIN) 600 MG tablet, Take 600 mg by mouth 3 (three) times a day., Disp: ,  "Rfl:   •  glucagon (GLUCAGON EMERGENCY) 1 MG injection, Inject 1 mg under the skin 1 (one) time as needed for low blood sugar., Disp: , Rfl:   •  hydrochlorothiazide (MICROZIDE) 12.5 MG capsule, Take 12.5 mg by mouth Daily., Disp: , Rfl:   •  HYDROcodone-acetaminophen (NORCO) 7.5-325 MG per tablet, Take 1 tablet by mouth Every 4 (Four) Hours As Needed for Moderate Pain (4-6)., Disp: 180 tablet, Rfl: 0  •  insulin aspart (NOVOLOG FLEXPEN) 100 UNIT/ML solution pen-injector sc pen, Inject 40 units with meals, Disp: 30 mL, Rfl: 3  •  Insulin Degludec 100 UNIT/ML solution pen-injector, Inject 70 Units under the skin Daily., Disp: 9 pen, Rfl: 3  •  Insulin Degludec 200 UNIT/ML solution pen-injector, Inject 100 Units under the skin Daily., Disp: 6 pen, Rfl: 11  •  Insulin Syringe 29G X 1/2\" 0.3 ML misc, Use 3 times a day, Disp: 270 each, Rfl: 3  •  KETOCARE strip, USE 1 STRIP TO CHECK FOR KETONES IF BLOOD SUGAR IS GREATER THAN 250, Disp: , Rfl: 99  •  LORazepam (ATIVAN) 2 MG tablet, Take 1 tablet by mouth 2 (Two) Times a Day As Needed for Anxiety., Disp: 60 tablet, Rfl: 2  •  MAGOX 400 400 (241.3 MG) MG tablet tablet, TAKE 1 TABLET BY MOUTH TWICE A DAY, Disp: , Rfl: 12  •  metoprolol succinate XL (TOPROL-XL) 25 MG 24 hr tablet, Take 1 tablet by mouth Daily., Disp: 31 tablet, Rfl: 13  •  mirtazapine (REMERON) 45 MG tablet, TAKE 1 TABLET BY MOUTH EVERY DAY AT BEDTIME, Disp: 30 tablet, Rfl: 5  •  nabumetone (RELAFEN) 500 MG tablet, Take 1 tablet by mouth 2 (Two) Times a Day As Needed for mild pain (1-3)., Disp: 60 tablet, Rfl: 5  •  ondansetron (ZOFRAN) 8 MG tablet, Take 1 tablet by mouth every 8 (eight) hours., Disp: 90 tablet, Rfl: 3  •  ONE TOUCH ULTRA TEST test strip, USE TO TEST SUGAR 4 TIMES A DAY, Disp: 400 each, Rfl: 1  •  pantoprazole (PROTONIX) 40 MG EC tablet, Take 1 tablet by mouth daily., Disp: 90 tablet, Rfl: 3  •  ranolazine (RANEXA) 500 MG 12 hr tablet, Take 2 tablets by mouth 2 (Two) Times a Day., Disp: 120 " tablet, Rfl: 6  •  venlafaxine XR (EFFEXOR-XR) 150 MG 24 hr capsule, TAKE ONE CAPSULE BY MOUTH TWICE A DAY FOR DEPRESSION, Disp: 180 capsule, Rfl: 3  •  vitamin D (ERGOCALCIFEROL) 52447 UNITS capsule capsule, TAKE ONE CAPSULE BY MOUTH EVERY SUNDAY, Disp: 12 capsule, Rfl: 2    Physical Exam:  There were no vitals filed for this visit.  There is no height or weight on file to calculate BMI.    GEN: alert, appears stated age and cooperative  Body Habitus: well-nourished  Neuro: CN II-XII grossly intact.   HEENT: Head: Normocephalic, no lesions, without obvious abnormality.  Neck / Thyroid: Supple, no masses, nodes, nodules or enlargement. No arcus senilis, xanthelasma or xanthomas. PERRL. Normal external ears. No drainage. No thyromegaly. Neck supple. No LAD. Trachea midline. Nose, normal.  JVP: 6 cm of water at 45 degrees HJR: absent      Carotid:  Upstroke: easily palpated bilaterally Volume: Normal.    Carotid Bruit:  None  Subclavian Bruit: Not present.    Lymph: No overt LAD.   Back: Normal.  Chest:  Normal Excursion: Good    I:E: Good  Pulmonary:clear to auscultation, no wheezes, rales or rhonchi, symmetric air entry. Equal chest excursion. Chest physical exam is normal. No tenderness.        Precordium:  No palpable heaves or thrusts. P2 is not palpable.   [place]:  normal size and placement Palpable S4: Not present.   Heart rate: normal  Heart Rhythm: regular     Heart Sounds: S1: normal intensity  S2: normal intensity  S3: absent   S4: absent  Opening Snap: absent  A2-OS:  N/A  Pericardial rub: absent    Ejection click: None      Murmurs: Systolic: none  Diastolic: none  Abdomen: Soft, non-tender, normal bowel sounds; no bruits, organomegaly or masses.  Extremity: no edema, cyanosis  Pulses: Right radial artery has 2+ (normal) pulse and Left radial artery has 2+ (normal) pulse    DATA REVIEWED:   · The study is technically difficult for diagnosis. The quality of the study is limited due to poor acoustic windows  related to patient body habitus  · Left Ventricle: Left ventricular function is normal. Calculated EF = 57%.  · Left ventricular wall thickness is consistent with mild concentric hypertrophy  · Left ventricular diastolic dysfunction is noted (grade II w/high LAP) consistent with pseudonormalization. Elevated left atrial pressure.  · Right Ventricle: Normal cavity size, wall thickness, systolic function and septal motion noted  · Inferior vena cava not well visualized  · Mild mitral valve regurgitation is present  · PASP is estimated between 37 and 53 mmHg. Unable to evaluate right-sided filling pressures. Unable to exclude pulmonary hypertension.  · There is no evidence of pericardial effusion.        [facility], as above    TTE, 11/2016:  Preserved LVEF    Assessment/Plan      1. ASCAD. EF: 50%  last documented 11/2016. CCS class I. The patient has been revascularized with LIMA to LAD. The patient has incomplete revascularization. Anatomy with significant obstruction: circumflex.  ASA, Plavix, Atorvastatin  Ranexa, Change Coreg to Toprol XL for marginal HDs    2. HTN, essential.  BP noted to be well controlled today in office.   DASH; medication compliance; Low sodium diet  D/C Lisinopril and continue HCTZ     3. Dyspnea. Etiology is likely multifactorial. Functional class is: Class 2 - comfortable at rest but have symptoms with ordinary physcial activity..There are no signs of HF on examination. The patient does have risk factors for HF. She had some BNPs that were mildly elevated, but not markedly so. I would recommend a thorough pulmonary evaluation prior to evaluation for HFpEF. Additionally, I was unable to see her IVC on her most recent TTE, so her PASP is between 37 and 53mmHg. Will repeat this prior to her next appt.   -Sleep referral to evaluate for CHRISTOPHER  -Will obtain full PFTs, 6MWT  -Pulmonary referral: Dr. Gamboa    4. Cardiac Risk Assessment: Known CAD  Recommended ASA/Statin    4. Dyslipidemia.  Under good  control.  -Statin:  Yes.  -Recommended moderate-intensity statin therapy  -Lipid-lowering medications: Lipitor (atorvastatin)    5. Mild MR/TR. NYHA stage B; FC-I.   We'll plan on echocardiogram April 2020    7. Tobacco status: non-smoker     Plan for follow-up: in 2 months      EMR Dragon/Transcription disclaimer:     Much of this encounter note is an electronic transcription. This may permit erroneous, or at times, nonsensical words or phrases to be inadvertently transcribed; Although I have reviewed the note for such errors, some may still exist.

## 2017-06-27 RX ORDER — FLUTICASONE PROPIONATE 50 MCG
SPRAY, SUSPENSION (ML) NASAL
Qty: 16 ML | Refills: 0 | OUTPATIENT
Start: 2017-06-27

## 2017-07-03 RX ORDER — FLUTICASONE PROPIONATE 50 MCG
SPRAY, SUSPENSION (ML) NASAL
Qty: 16 ML | Refills: 0 | Status: SHIPPED | OUTPATIENT
Start: 2017-07-03 | End: 2017-08-08 | Stop reason: SDUPTHER

## 2017-07-10 DIAGNOSIS — G89.4 CHRONIC PAIN DISORDER: ICD-10-CM

## 2017-07-10 RX ORDER — HYDROCODONE BITARTRATE AND ACETAMINOPHEN 7.5; 325 MG/1; MG/1
1 TABLET ORAL EVERY 4 HOURS PRN
Qty: 180 TABLET | Refills: 0 | Status: SHIPPED | OUTPATIENT
Start: 2017-07-10 | End: 2017-08-08 | Stop reason: SDUPTHER

## 2017-07-12 ENCOUNTER — OFFICE VISIT (OUTPATIENT)
Dept: SLEEP MEDICINE | Facility: HOSPITAL | Age: 57
End: 2017-07-12

## 2017-07-12 VITALS
WEIGHT: 263 LBS | HEART RATE: 88 BPM | HEIGHT: 68 IN | OXYGEN SATURATION: 94 % | SYSTOLIC BLOOD PRESSURE: 120 MMHG | BODY MASS INDEX: 39.86 KG/M2 | DIASTOLIC BLOOD PRESSURE: 84 MMHG

## 2017-07-12 DIAGNOSIS — G47.10 HYPERSOMNIA WITH SLEEP APNEA: Primary | ICD-10-CM

## 2017-07-12 DIAGNOSIS — G47.30 HYPERSOMNIA WITH SLEEP APNEA: Primary | ICD-10-CM

## 2017-07-12 PROCEDURE — 99203 OFFICE O/P NEW LOW 30 MIN: CPT | Performed by: INTERNAL MEDICINE

## 2017-07-12 NOTE — PROGRESS NOTES
New patient Sleep Consultation    Encounter Date: 7/12/2017         Patient's PCP: Francisca Fong MD  Referring provider: No ref. provider found  Reason for consultation: snoring    Ariana Martinez is a 57 y.o. female who complains of snoring, obesity, and heart disease. She had lap band with reversal.    Sleep history:    Bed time: 2245  Sleep latency: 30 minutes  Number of times awakens during the night: 1-2 trips to bathroom  Wake time: 0800  Estimated total sleep time at night: 8 hours  Caffeine intake: none  Alcohol intake: none  Nap time: none  Driving: no sleepiness while driving.  She denies abnormal dreams, sleep paralysis, hypnagogic hallucinations, cataplexy symptoms, RLS    Tonsillectomy: No    Weight Issues:  Weight recorded over past 5 years:   weight loss of 75 lbs    Past Medical History:   Diagnosis Date   • Acquired hypothyroidism    • Angina, class IV    • Anxiety    • Benign paroxysmal positional vertigo    • Carpal tunnel syndrome    • Chronic pain    • Coronary atherosclerosis    • Depression    • Diabetes mellitus     Type 2, controlled   • Diabetic polyneuropathy    • Female stress incontinence    • Hashimoto's thyroiditis    • Hyperlipidemia    • Hypertension    • Low back pain    • Malaise and fatigue    • Multiple joint pain    • Obesity     Refuses to be weighed   • Otalgia     Both   • Perforation of tympanic membrane     Left   • Postoperative wound infection    • Vitamin D deficiency      Social History     Social History   • Marital status:      Spouse name: N/A   • Number of children: N/A   • Years of education: N/A     Occupational History   • Not on file.     Social History Main Topics   • Smoking status: Never Smoker   • Smokeless tobacco: Never Used   • Alcohol use No   • Drug use: No   • Sexual activity: Defer     Other Topics Concern   • Not on file     Social History Narrative     Family History   Problem Relation Age of Onset   • Diabetes Other    • Heart  disease Other    • Hypertension Other      Retired schoolteacher, no children, 5 sisters.  Smoking history: smoked never  FH of sleep disorders: unknown    Review of Systems:  A comprehensive review of systems was negative.      Vitals:    07/12/17 0908   BP: 120/84   Pulse: 88   SpO2: 94%     Body mass index is 39.99 kg/(m^2).  [place] - 6    Gen:  No distress, appears stated age, alert, oriented to person, place, and time  Heent:   NC/AT, PERRLA, EOMI, anicteric sclera    External ears normal, OP clear, Mallamati 4 with upper and lower dentures (out at night)    Nose: patent  NECK:  Supple, midline trachea, no palpable goiter  LUNGS: Clear breath sounds bilaterally, nonlabored breathing  CV:  Normal S1/S2, without murmur, no peripheral edema  ABD:  Non tender, obese  EXT:  No cyanosis or clubbing        ASSESSMENT:  1. Obstructive sleep apnea presumed  2. Medical problems as above - of note has a bladder stimulator and battery operated blood glucose monitor.  3. Depression  4. Chronic pain  5. Morbid obesity  6. Diabetes Mellitus    PLAN:  1. In laboratory polysomnography testing    2. RTC in 1 week after PSG    Mahin Ramirez MD        CC: Francisca Fong MD          No ref. provider found

## 2017-07-20 ENCOUNTER — LAB (OUTPATIENT)
Dept: LAB | Facility: HOSPITAL | Age: 57
End: 2017-07-20

## 2017-07-20 DIAGNOSIS — I10 ESSENTIAL HYPERTENSION: ICD-10-CM

## 2017-07-20 DIAGNOSIS — E78.5 HYPERLIPIDEMIA, UNSPECIFIED HYPERLIPIDEMIA TYPE: ICD-10-CM

## 2017-07-20 DIAGNOSIS — E55.9 VITAMIN D DEFICIENCY: ICD-10-CM

## 2017-07-20 DIAGNOSIS — IMO0002 UNCONTROLLED TYPE 2 DIABETES MELLITUS WITH DIABETIC POLYNEUROPATHY, WITH LONG-TERM CURRENT USE OF INSULIN: ICD-10-CM

## 2017-07-20 LAB
25(OH)D3 SERPL-MCNC: 26 NG/ML (ref 30–100)
ALBUMIN SERPL-MCNC: 4.4 G/DL (ref 3.4–4.8)
ALBUMIN UR-MCNC: 12 MG/L
ALBUMIN/GLOB SERPL: 1.3 G/DL (ref 1.1–1.8)
ALP SERPL-CCNC: 135 U/L (ref 38–126)
ALT SERPL W P-5'-P-CCNC: 37 U/L (ref 9–52)
ANION GAP SERPL CALCULATED.3IONS-SCNC: 14 MMOL/L (ref 5–15)
ARTICHOKE IGE QN: 119 MG/DL (ref 1–129)
AST SERPL-CCNC: 41 U/L (ref 14–36)
BASOPHILS # BLD AUTO: 0.05 10*3/MM3 (ref 0–0.2)
BASOPHILS NFR BLD AUTO: 0.4 % (ref 0–2)
BILIRUB SERPL-MCNC: 0.5 MG/DL (ref 0.2–1.3)
BUN BLD-MCNC: 13 MG/DL (ref 7–21)
BUN/CREAT SERPL: 15.9 (ref 7–25)
CALCIUM SPEC-SCNC: 9.6 MG/DL (ref 8.4–10.2)
CHLORIDE SERPL-SCNC: 99 MMOL/L (ref 95–110)
CHOLEST SERPL-MCNC: 215 MG/DL (ref 0–199)
CO2 SERPL-SCNC: 27 MMOL/L (ref 22–31)
CREAT BLD-MCNC: 0.82 MG/DL (ref 0.5–1)
CREAT UR-MCNC: 192.9 MG/DL
CREAT UR-MCNC: 192.9 MG/DL
DEPRECATED RDW RBC AUTO: 43.4 FL (ref 36.4–46.3)
EOSINOPHIL # BLD AUTO: 0.25 10*3/MM3 (ref 0–0.7)
EOSINOPHIL NFR BLD AUTO: 2 % (ref 0–7)
ERYTHROCYTE [DISTWIDTH] IN BLOOD BY AUTOMATED COUNT: 14.3 % (ref 11.5–14.5)
GFR SERPL CREATININE-BSD FRML MDRD: 72 ML/MIN/1.73 (ref 51–120)
GLOBULIN UR ELPH-MCNC: 3.4 GM/DL (ref 2.3–3.5)
GLUCOSE BLD-MCNC: 223 MG/DL (ref 60–100)
HBA1C MFR BLD: 8.26 % (ref 4–5.6)
HCT VFR BLD AUTO: 42.4 % (ref 35–45)
HDLC SERPL-MCNC: 40 MG/DL (ref 60–200)
HGB BLD-MCNC: 13.8 G/DL (ref 12–15.5)
IMM GRANULOCYTES # BLD: 0.04 10*3/MM3 (ref 0–0.02)
IMM GRANULOCYTES NFR BLD: 0.3 % (ref 0–0.5)
LDLC/HDLC SERPL: 2.43 {RATIO} (ref 0–3.22)
LYMPHOCYTES # BLD AUTO: 2.15 10*3/MM3 (ref 0.6–4.2)
LYMPHOCYTES NFR BLD AUTO: 17.2 % (ref 10–50)
MCH RBC QN AUTO: 27.6 PG (ref 26.5–34)
MCHC RBC AUTO-ENTMCNC: 32.5 G/DL (ref 31.4–36)
MCV RBC AUTO: 84.8 FL (ref 80–98)
MICROALBUMIN/CREAT UR: 62.2 MG/G (ref 0–30)
MONOCYTES # BLD AUTO: 0.64 10*3/MM3 (ref 0–0.9)
MONOCYTES NFR BLD AUTO: 5.1 % (ref 0–12)
NEUTROPHILS # BLD AUTO: 9.35 10*3/MM3 (ref 2–8.6)
NEUTROPHILS NFR BLD AUTO: 75 % (ref 37–80)
PLATELET # BLD AUTO: 469 10*3/MM3 (ref 150–450)
PMV BLD AUTO: 9.8 FL (ref 8–12)
POTASSIUM BLD-SCNC: 3.7 MMOL/L (ref 3.5–5.1)
PROT SERPL-MCNC: 7.8 G/DL (ref 6.3–8.6)
PROT UR-MCNC: 23.6 MG/DL
PROT/CREAT UR: 122.3 MG/G CREA (ref 0–200)
RBC # BLD AUTO: 5 10*6/MM3 (ref 3.77–5.16)
SODIUM BLD-SCNC: 140 MMOL/L (ref 137–145)
TRIGL SERPL-MCNC: 389 MG/DL (ref 20–199)
TSH SERPL DL<=0.05 MIU/L-ACNC: 5.06 MIU/ML (ref 0.46–4.68)
WBC NRBC COR # BLD: 12.48 10*3/MM3 (ref 3.2–9.8)

## 2017-07-20 PROCEDURE — 84156 ASSAY OF PROTEIN URINE: CPT | Performed by: NURSE PRACTITIONER

## 2017-07-20 PROCEDURE — 82570 ASSAY OF URINE CREATININE: CPT | Performed by: NURSE PRACTITIONER

## 2017-07-20 PROCEDURE — 83036 HEMOGLOBIN GLYCOSYLATED A1C: CPT | Performed by: NURSE PRACTITIONER

## 2017-07-20 PROCEDURE — 82306 VITAMIN D 25 HYDROXY: CPT | Performed by: NURSE PRACTITIONER

## 2017-07-20 PROCEDURE — 85025 COMPLETE CBC W/AUTO DIFF WBC: CPT | Performed by: NURSE PRACTITIONER

## 2017-07-20 PROCEDURE — 84443 ASSAY THYROID STIM HORMONE: CPT | Performed by: NURSE PRACTITIONER

## 2017-07-20 PROCEDURE — 82043 UR ALBUMIN QUANTITATIVE: CPT | Performed by: NURSE PRACTITIONER

## 2017-07-20 PROCEDURE — 80061 LIPID PANEL: CPT | Performed by: NURSE PRACTITIONER

## 2017-07-20 PROCEDURE — 36415 COLL VENOUS BLD VENIPUNCTURE: CPT

## 2017-07-20 PROCEDURE — 80053 COMPREHEN METABOLIC PANEL: CPT | Performed by: NURSE PRACTITIONER

## 2017-07-24 ENCOUNTER — OFFICE VISIT (OUTPATIENT)
Dept: ENDOCRINOLOGY | Facility: CLINIC | Age: 57
End: 2017-07-24

## 2017-07-24 VITALS
BODY MASS INDEX: 38.34 KG/M2 | HEART RATE: 83 BPM | HEIGHT: 68 IN | OXYGEN SATURATION: 97 % | DIASTOLIC BLOOD PRESSURE: 70 MMHG | SYSTOLIC BLOOD PRESSURE: 118 MMHG | WEIGHT: 253 LBS

## 2017-07-24 DIAGNOSIS — E66.9 OBESITY (BMI 30-39.9): ICD-10-CM

## 2017-07-24 DIAGNOSIS — R19.7 DIARRHEA, UNSPECIFIED TYPE: ICD-10-CM

## 2017-07-24 DIAGNOSIS — E03.8 HYPOTHYROIDISM DUE TO HASHIMOTO'S THYROIDITIS: ICD-10-CM

## 2017-07-24 DIAGNOSIS — E55.9 VITAMIN D DEFICIENCY: ICD-10-CM

## 2017-07-24 DIAGNOSIS — E78.2 MIXED HYPERLIPIDEMIA: ICD-10-CM

## 2017-07-24 DIAGNOSIS — E06.3 HYPOTHYROIDISM DUE TO HASHIMOTO'S THYROIDITIS: ICD-10-CM

## 2017-07-24 DIAGNOSIS — IMO0002 UNCONTROLLED TYPE 2 DIABETES MELLITUS WITH COMPLICATION, WITH LONG-TERM CURRENT USE OF INSULIN: Primary | ICD-10-CM

## 2017-07-24 PROCEDURE — 99214 OFFICE O/P EST MOD 30 MIN: CPT | Performed by: NURSE PRACTITIONER

## 2017-07-24 RX ORDER — ERGOCALCIFEROL 1.25 MG/1
50000 CAPSULE ORAL
COMMUNITY
End: 2017-08-31

## 2017-07-24 NOTE — PROGRESS NOTES
Subjective    Ariana Martinez is a 57 y.o. female. she is here today for follow-up.    History of Present Illness       Duration/Timing: Diabetes mellitus type 2, Age at onset of diabetes: 26 years years, Onset of symptoms gradual  timing - constant     qualtiy - uncontrolled     severity - moderate        -----------------------     Severity (Complications/Hospitalizations)  Secondary Macrovascular Complications: No CAD, No CVA, No PAD  Secondary Microvascular Complications: No Diabetic Nephropathy, No Diabetic Retinopathy, Diabetic Neuropathy     Context  Diabetes Regimen: Insulin, Oral Medications, Last HgbA1c% 12.3 from Jan. 2017    Lab Results   Component Value Date    HGBA1C 8.26 (H) 07/20/2017       Blood Glucose Readings     Now on dexcom sensor          Diet  variable carb intake  Exercise: Exercises  walking     Associated Signs/Symptoms  Hyperglycemic Symptoms: No polyuria, No polydipsia, No polyphagia, No weight gain  Hypoglycemic Episodes: Documented symptomatic hypoglycemia, Seizures and syncope related to hypoglycemia               The following portions of the patient's history were reviewed and updated as appropriate:   Past Medical History:   Diagnosis Date   • Acquired hypothyroidism    • Angina, class IV    • Anxiety    • Benign paroxysmal positional vertigo    • Carpal tunnel syndrome    • Chronic pain    • Coronary atherosclerosis    • Depression    • Diabetes mellitus     Type 2, controlled   • Diabetic polyneuropathy    • Female stress incontinence    • Hashimoto's thyroiditis    • Hyperlipidemia    • Hypertension    • Low back pain    • Malaise and fatigue    • Multiple joint pain    • Obesity     Refuses to be weighed   • Otalgia     Both   • Perforation of tympanic membrane     Left   • Postoperative wound infection    • Vitamin D deficiency      Past Surgical History:   Procedure Laterality Date   • ABDOMINAL SURGERY     • ANGIOPLASTY      coronary   • CARDIAC CATHETERIZATION     •  CARDIAC CATHETERIZATION N/A 6/20/2017    Procedure: Right Heart Cath;  Surgeon: Can Kwon MD PhD;  Location: [facility] CATH INVASIVE LOCATION;  Service:    • CARPAL TUNNEL RELEASE     • CHOLECYSTECTOMY     • CORONARY ARTERY BYPASS GRAFT     • ENDOSCOPY AND COLONOSCOPY     • FOOT SURGERY      Toes   • GASTRIC BANDING      Revision, laparoscopic   • HYSTERECTOMY     • MOUTH SURGERY     • SALPINGO OOPHORECTOMY     • SHOULDER SURGERY     • TRANSESOPHAGEAL ECHOCARDIOGRAM (LAMONTE)      With color flow     Family History   Problem Relation Age of Onset   • Diabetes Other    • Heart disease Other    • Hypertension Other      OB History     No data available        Current Outpatient Prescriptions   Medication Sig Dispense Refill   • ARIPiprazole (ABILIFY) 15 MG tablet TAKE 1 TABLET BY MOUTH DAILY. 30 tablet 5   • aspirin 81 MG chewable tablet Chew 81 mg daily.     • atorvastatin (LIPITOR) 40 MG tablet Take 40 mg by mouth Every Night.     • Calcium Citrate-Vitamin D (CITRACAL/VITAMIN D) 250-200 MG-UNIT tablet Take 2 tablets by mouth 2 (two) times a day.     • chlorthalidone (HYGROTON) 25 MG tablet Take 1 tablet by mouth Daily. 31 tablet 3   • clopidogrel (PLAVIX) 75 MG tablet Take 1 tablet by mouth Daily. 90 tablet 3   • fluticasone (FLONASE) 50 MCG/ACT nasal spray PUT 2 SPRAYS INTO EACH NOSTRIL DAILY 16 mL 0   • fluticasone (FLONASE) 50 MCG/ACT nasal spray ADMINISTER 2 SPRAYS INTO EACH NOSTRIL DAILY FOR 30 DAYS. 16 mL 0   • furosemide (LASIX) 40 MG tablet Take 1 tablet by mouth Daily. 90 tablet 3   • gabapentin (NEURONTIN) 600 MG tablet Take 600 mg by mouth 3 (three) times a day.     • glucagon (GLUCAGON EMERGENCY) 1 MG injection Inject 1 mg under the skin 1 (one) time as needed for low blood sugar.     • HYDROcodone-acetaminophen (NORCO) 7.5-325 MG per tablet Take 1 tablet by mouth Every 4 (Four) Hours As Needed for Moderate Pain (4-6). 180 tablet 0   • insulin aspart (novoLOG FLEXPEN) 100 UNIT/ML solution  pen-injector sc pen Inject 40 units with meals 30 mL 5   • Insulin Degludec 200 UNIT/ML solution pen-injector Inject 100 Units under the skin Daily. 6 pen 11   • LORazepam (ATIVAN) 2 MG tablet Take 1 tablet by mouth 2 (Two) Times a Day As Needed for Anxiety. 60 tablet 2   • MAGOX 400 400 (241.3 MG) MG tablet tablet TAKE 1 TABLET BY MOUTH TWICE A DAY  12   • metoprolol succinate XL (TOPROL-XL) 25 MG 24 hr tablet Take 1 tablet by mouth Daily. 31 tablet 13   • mirtazapine (REMERON) 45 MG tablet TAKE 1 TABLET BY MOUTH EVERY DAY AT BEDTIME 30 tablet 5   • nabumetone (RELAFEN) 500 MG tablet Take 1 tablet by mouth 2 (Two) Times a Day As Needed for mild pain (1-3). 60 tablet 5   • ondansetron (ZOFRAN) 8 MG tablet Take 1 tablet by mouth every 8 (eight) hours. 90 tablet 3   • pantoprazole (PROTONIX) 40 MG EC tablet Take 1 tablet by mouth daily. 90 tablet 3   • ranolazine (RANEXA) 500 MG 12 hr tablet Take 2 tablets by mouth 2 (Two) Times a Day. 120 tablet 6   • venlafaxine XR (EFFEXOR-XR) 150 MG 24 hr capsule TAKE ONE CAPSULE BY MOUTH TWICE A DAY FOR DEPRESSION 180 capsule 3   • vitamin D (ERGOCALCIFEROL) 54593 UNITS capsule capsule TAKE ONE CAPSULE BY MOUTH EVERY SUNDAY 12 capsule 2   • vitamin D (ERGOCALCIFEROL) 72081 UNITS capsule capsule Take 50,000 Units by mouth.       No current facility-administered medications for this visit.      Allergies   Allergen Reactions   • Seroquel [Quetiapine Fumarate] Anaphylaxis   • Avandia [Rosiglitazone] Swelling   • Morphine And Related Hallucinations     If patient takes to much     Social History     Social History   • Marital status:      Spouse name: N/A   • Number of children: N/A   • Years of education: N/A     Social History Main Topics   • Smoking status: Never Smoker   • Smokeless tobacco: Never Used   • Alcohol use No   • Drug use: No   • Sexual activity: Defer     Other Topics Concern   • None     Social History Narrative       Review of Systems  Review of Systems  "  Constitutional: Negative for activity change, appetite change, chills, diaphoresis and fatigue.   HENT: Negative for congestion, dental problem, drooling, ear discharge, ear pain, facial swelling, sneezing, sore throat, tinnitus, trouble swallowing and voice change.    Eyes: Negative for photophobia, pain, discharge, redness, itching and visual disturbance.   Respiratory: Negative for apnea, cough, choking, chest tightness and shortness of breath.    Cardiovascular: Negative for chest pain, palpitations and leg swelling.   Gastrointestinal: Negative for abdominal distention, abdominal pain, constipation, diarrhea, nausea and vomiting.   Endocrine: Negative for cold intolerance, heat intolerance, polydipsia, polyphagia and polyuria.   Genitourinary: Negative for difficulty urinating, dysuria, frequency, hematuria and urgency.   Musculoskeletal: Negative for arthralgias, back pain, gait problem, joint swelling, myalgias, neck pain and neck stiffness.   Skin: Negative for color change, pallor, rash and wound.   Allergic/Immunologic: Negative for environmental allergies, food allergies and immunocompromised state.   Neurological: Negative for dizziness, tremors, facial asymmetry, weakness, light-headedness, numbness and headaches.   Hematological: Negative for adenopathy. Does not bruise/bleed easily.   Psychiatric/Behavioral: Negative for agitation, behavioral problems, confusion, decreased concentration, dysphoric mood and sleep disturbance.        Objective    /70 (BP Location: Left arm, Patient Position: Sitting, Cuff Size: Adult)  Pulse 83  Ht 68\" (172.7 cm)  Wt 253 lb (115 kg)  SpO2 97%  BMI 38.47 kg/m2  Physical Exam   Constitutional: She is oriented to person, place, and time. She appears well-developed and well-nourished. No distress.   HENT:   Head: Normocephalic and atraumatic.   Right Ear: External ear normal.   Left Ear: External ear normal.   Nose: Nose normal.   Eyes: Conjunctivae and EOM are " normal. Pupils are equal, round, and reactive to light.   Neck: Normal range of motion. Neck supple. No tracheal deviation present. No thyromegaly present.   Cardiovascular: Normal rate, regular rhythm and normal heart sounds.    No murmur heard.  Pulmonary/Chest: Effort normal and breath sounds normal. No respiratory distress. She has no wheezes.   Abdominal: Soft. Bowel sounds are normal. There is no tenderness. There is no rebound and no guarding.   Musculoskeletal: Normal range of motion. She exhibits no edema, tenderness or deformity.   Neurological: She is alert and oriented to person, place, and time. No cranial nerve deficit.   Skin: Skin is warm and dry. No rash noted.   Psychiatric: She has a normal mood and affect. Her behavior is normal. Judgment and thought content normal.       Lab Review  Glucose (mg/dL)   Date Value   07/20/2017 223 (H)   04/06/2017 213 (H)   04/05/2017 40 (C)     Sodium (mmol/L)   Date Value   07/20/2017 140   04/06/2017 140   04/05/2017 138     Potassium (mmol/L)   Date Value   07/20/2017 3.7   04/06/2017 4.6   04/05/2017 3.5     Chloride (mmol/L)   Date Value   07/20/2017 99   04/06/2017 107   04/05/2017 103     CO2 (mmol/L)   Date Value   07/20/2017 27.0   04/06/2017 24.0   04/05/2017 25.0     BUN (mg/dL)   Date Value   07/20/2017 13   04/06/2017 12   04/05/2017 13     Creatinine (mg/dL)   Date Value   07/20/2017 0.82   04/06/2017 0.86   04/05/2017 1.05 (H)     Hemoglobin A1C (%)   Date Value   07/20/2017 8.26 (H)   04/05/2017 11.43 (H)   03/11/2017 11.99 (H)     Triglycerides   Date Value   07/20/2017 389 mg/dL (H)   03/12/2017 290 mg/dL (H)   01/19/2017 235 mg/dl (H)   11/12/2016 290 mg/dl (H)   06/20/2016 267 mg/dl (H)       Assessment/Plan      1. Uncontrolled type 2 diabetes mellitus with complication, with long-term current use of insulin    2. Hypothyroidism due to Hashimoto's thyroiditis    3. Vitamin D deficiency    4. Obesity (BMI 30-39.9)    5. Mixed hyperlipidemia     6. Diarrhea, unspecified type    .    Medications prescribed:  Outpatient Encounter Prescriptions as of 7/24/2017   Medication Sig Dispense Refill   • ARIPiprazole (ABILIFY) 15 MG tablet TAKE 1 TABLET BY MOUTH DAILY. 30 tablet 5   • aspirin 81 MG chewable tablet Chew 81 mg daily.     • atorvastatin (LIPITOR) 40 MG tablet Take 40 mg by mouth Every Night.     • Calcium Citrate-Vitamin D (CITRACAL/VITAMIN D) 250-200 MG-UNIT tablet Take 2 tablets by mouth 2 (two) times a day.     • chlorthalidone (HYGROTON) 25 MG tablet Take 1 tablet by mouth Daily. 31 tablet 3   • clopidogrel (PLAVIX) 75 MG tablet Take 1 tablet by mouth Daily. 90 tablet 3   • fluticasone (FLONASE) 50 MCG/ACT nasal spray PUT 2 SPRAYS INTO EACH NOSTRIL DAILY 16 mL 0   • fluticasone (FLONASE) 50 MCG/ACT nasal spray ADMINISTER 2 SPRAYS INTO EACH NOSTRIL DAILY FOR 30 DAYS. 16 mL 0   • furosemide (LASIX) 40 MG tablet Take 1 tablet by mouth Daily. 90 tablet 3   • gabapentin (NEURONTIN) 600 MG tablet Take 600 mg by mouth 3 (three) times a day.     • glucagon (GLUCAGON EMERGENCY) 1 MG injection Inject 1 mg under the skin 1 (one) time as needed for low blood sugar.     • HYDROcodone-acetaminophen (NORCO) 7.5-325 MG per tablet Take 1 tablet by mouth Every 4 (Four) Hours As Needed for Moderate Pain (4-6). 180 tablet 0   • insulin aspart (novoLOG FLEXPEN) 100 UNIT/ML solution pen-injector sc pen Inject 40 units with meals 30 mL 5   • Insulin Degludec 200 UNIT/ML solution pen-injector Inject 100 Units under the skin Daily. 6 pen 11   • LORazepam (ATIVAN) 2 MG tablet Take 1 tablet by mouth 2 (Two) Times a Day As Needed for Anxiety. 60 tablet 2   • MAGOX 400 400 (241.3 MG) MG tablet tablet TAKE 1 TABLET BY MOUTH TWICE A DAY  12   • metoprolol succinate XL (TOPROL-XL) 25 MG 24 hr tablet Take 1 tablet by mouth Daily. 31 tablet 13   • mirtazapine (REMERON) 45 MG tablet TAKE 1 TABLET BY MOUTH EVERY DAY AT BEDTIME 30 tablet 5   • nabumetone (RELAFEN) 500 MG tablet Take 1  tablet by mouth 2 (Two) Times a Day As Needed for mild pain (1-3). 60 tablet 5   • ondansetron (ZOFRAN) 8 MG tablet Take 1 tablet by mouth every 8 (eight) hours. 90 tablet 3   • pantoprazole (PROTONIX) 40 MG EC tablet Take 1 tablet by mouth daily. 90 tablet 3   • ranolazine (RANEXA) 500 MG 12 hr tablet Take 2 tablets by mouth 2 (Two) Times a Day. 120 tablet 6   • venlafaxine XR (EFFEXOR-XR) 150 MG 24 hr capsule TAKE ONE CAPSULE BY MOUTH TWICE A DAY FOR DEPRESSION 180 capsule 3   • vitamin D (ERGOCALCIFEROL) 50319 UNITS capsule capsule TAKE ONE CAPSULE BY MOUTH EVERY SUNDAY 12 capsule 2   • vitamin D (ERGOCALCIFEROL) 92190 UNITS capsule capsule Take 50,000 Units by mouth.       No facility-administered encounter medications on file as of 7/24/2017.        Orders placed during this encounter include:  Orders Placed This Encounter   Procedures   • Comprehensive Metabolic Panel   • TSH   • Vitamin D 25 Hydroxy   • Hemoglobin A1c   • Ambulatory Referral to Gastroenterology     Referral Priority:   Routine     Referral Type:   Consultation     Referral Reason:   Specialty Services Required     Referred to Provider:   Raymon Lord PA-C     Requested Specialty:   Gastroenterology     Number of Visits Requested:   1   • CBC & Differential     Order Specific Question:   Manual Differential     Answer:   No     Glycemic Management        dexcom sensor            Tresiba -- taking 115         ==================        Novolog      Taking 60 units      Discussed carb counting but states cannot     She will need to look at her meals because 30 units may not be enough for some meals and for others meals to strong     If you eat a meal and give 30 units and your sugar is 200 or higher before the next meal look back at that meal and the next time you eat it either give more insulin or eat less     Also if you have a low after the meal give less insulin the next time you eat that meal                  ==================      Jardiance 10 mg tablet , take before breakfast---will stop due to dizziness for possible volume depletion-----the dizziness has resolved since stopping jardiance        ==================     Metformin 500 mg tablets - caused diarrhea --stopped      ====================     start bydureon - tolerated but since trouble with gastroparesis, stop            januvia 100 mg daily -- stopped -- restart      ------------------------------     Lipid Management        on Lipitor   on fenofibrate     August 2015     LDL - 122     missed some doses of medication      Feb. 2016     Tg - 241  LDL - 102     missing doses still - please be complaint     May 2016     LDL - 85      Jan. 2017     LDL - 120     Has been taking medication faithfully      LDL needs to be 70 or less     add zetia     Also discussed restarting the jardiance for the heart benefits but refuses    Lab Results   Component Value Date    HGBA1C 8.26 (H) 07/20/2017             Blood Pressure Management: Control of blood pressure  on ACEi     stopped the lasix   Microvascular Complication Monitoring: Neuropathy type sensorial  neurontin     intermittetly takes reglan for gastroparesis     states eye exam ws 2015  RX for shoes - diabetic neuropathy      Immunization - last influenza vaccine Oct. 2016   Other Diabetes Related Aspects  TSH abnormal from July to Sept 2014     TSH in the 5 to 7 range     confirmed now Jan 2015     start levothyroxine 50 mcgs daily, skip on Sunday     now TSH nl      stopped levothyroxine      TSH - nl     will monitor TSH         August 2015     TSH - nl     FEb. 2016     TSh - 6     she refuses thyroid medicaton      May 2016     TSH - 5     Jan. 2017      TSH - nl              Lab Results   Component Value Date     TSH 4.870 (H) 03/11/2017      Still does not want medication    Lab Results   Component Value Date    TSH 5.060 (H) 07/20/2017       ---     3-15     B12 low      now b12 shots     June 2015     B12- 968     Vitamin d  def      Vitamin d - 26     Continue vitamin d supplement       Referral to GI     Reason - diarrhea chronic     History of gastroparesis       4. Follow-up: Return in about 3 months (around 10/24/2017) for Recheck.

## 2017-07-25 RX ORDER — NABUMETONE 500 MG/1
TABLET, FILM COATED ORAL
Qty: 60 TABLET | Refills: 0 | Status: SHIPPED | OUTPATIENT
Start: 2017-07-25 | End: 2017-12-06

## 2017-07-28 RX ORDER — IBUPROFEN 600 MG/1
TABLET ORAL
Qty: 1 KIT | Refills: 0 | Status: SHIPPED | OUTPATIENT
Start: 2017-07-28 | End: 2022-02-07 | Stop reason: HOSPADM

## 2017-08-03 DIAGNOSIS — G47.33 OSA (OBSTRUCTIVE SLEEP APNEA): Primary | ICD-10-CM

## 2017-08-08 ENCOUNTER — HOSPITAL ENCOUNTER (EMERGENCY)
Facility: HOSPITAL | Age: 57
Discharge: HOME | End: 2017-08-08
Attending: FAMILY MEDICINE | Admitting: FAMILY MEDICINE

## 2017-08-08 ENCOUNTER — APPOINTMENT (OUTPATIENT)
Dept: CT IMAGING | Facility: HOSPITAL | Age: 57
End: 2017-08-08

## 2017-08-08 ENCOUNTER — OFFICE VISIT (OUTPATIENT)
Dept: FAMILY MEDICINE CLINIC | Facility: CLINIC | Age: 57
End: 2017-08-08

## 2017-08-08 ENCOUNTER — LAB (OUTPATIENT)
Dept: LAB | Facility: OTHER | Age: 57
End: 2017-08-08

## 2017-08-08 VITALS
RESPIRATION RATE: 20 BRPM | OXYGEN SATURATION: 94 % | BODY MASS INDEX: 40.32 KG/M2 | TEMPERATURE: 98.1 F | HEART RATE: 77 BPM | HEIGHT: 68 IN | DIASTOLIC BLOOD PRESSURE: 59 MMHG | SYSTOLIC BLOOD PRESSURE: 102 MMHG | WEIGHT: 266 LBS

## 2017-08-08 VITALS
SYSTOLIC BLOOD PRESSURE: 120 MMHG | HEART RATE: 79 BPM | HEIGHT: 68 IN | OXYGEN SATURATION: 95 % | WEIGHT: 266 LBS | TEMPERATURE: 97.4 F | DIASTOLIC BLOOD PRESSURE: 72 MMHG | BODY MASS INDEX: 40.32 KG/M2

## 2017-08-08 DIAGNOSIS — R10.32 LLQ PAIN: ICD-10-CM

## 2017-08-08 DIAGNOSIS — Z12.31 ENCOUNTER FOR SCREENING MAMMOGRAM FOR MALIGNANT NEOPLASM OF BREAST: ICD-10-CM

## 2017-08-08 DIAGNOSIS — G89.4 CHRONIC PAIN DISORDER: ICD-10-CM

## 2017-08-08 DIAGNOSIS — N30.01 ACUTE CYSTITIS WITH HEMATURIA: ICD-10-CM

## 2017-08-08 DIAGNOSIS — F41.1 GAD (GENERALIZED ANXIETY DISORDER): Primary | ICD-10-CM

## 2017-08-08 DIAGNOSIS — R10.9 LEFT FLANK PAIN: Primary | ICD-10-CM

## 2017-08-08 DIAGNOSIS — R31.9 HEMATURIA: ICD-10-CM

## 2017-08-08 DIAGNOSIS — E66.01 MORBID OBESITY DUE TO EXCESS CALORIES (HCC): ICD-10-CM

## 2017-08-08 LAB
ALBUMIN SERPL-MCNC: 4.1 G/DL (ref 3.4–4.8)
ALBUMIN/GLOB SERPL: 1.4 G/DL (ref 1.1–1.8)
ALP SERPL-CCNC: 137 U/L (ref 38–126)
ALT SERPL W P-5'-P-CCNC: 36 U/L (ref 9–52)
ANION GAP SERPL CALCULATED.3IONS-SCNC: 12 MMOL/L (ref 5–15)
AST SERPL-CCNC: 41 U/L (ref 14–36)
BACTERIA UR QL AUTO: ABNORMAL /HPF
BASOPHILS # BLD AUTO: 0.03 10*3/MM3 (ref 0–0.2)
BASOPHILS # BLD AUTO: 0.03 10*3/MM3 (ref 0–0.2)
BASOPHILS NFR BLD AUTO: 0.2 % (ref 0–2)
BASOPHILS NFR BLD AUTO: 0.2 % (ref 0–2)
BILIRUB BLD-MCNC: ABNORMAL MG/DL
BILIRUB SERPL-MCNC: 0.5 MG/DL (ref 0.2–1.3)
BILIRUB UR QL STRIP: NEGATIVE
BUN BLD-MCNC: 26 MG/DL (ref 7–21)
BUN/CREAT SERPL: 17.6 (ref 7–25)
CALCIUM SPEC-SCNC: 8.9 MG/DL (ref 8.4–10.2)
CHLORIDE SERPL-SCNC: 93 MMOL/L (ref 95–110)
CK MB SERPL-CCNC: 1.77 NG/ML (ref 0–5)
CK SERPL-CCNC: 112 U/L (ref 30–135)
CLARITY UR: ABNORMAL
CLARITY, POC: ABNORMAL
CO2 SERPL-SCNC: 31 MMOL/L (ref 22–31)
COLOR UR: ABNORMAL
COLOR UR: ABNORMAL
CREAT BLD-MCNC: 1.48 MG/DL (ref 0.5–1)
DEPRECATED RDW RBC AUTO: 43.6 FL (ref 36.4–46.3)
DEPRECATED RDW RBC AUTO: 44.9 FL (ref 36.4–46.3)
EOSINOPHIL # BLD AUTO: 0.36 10*3/MM3 (ref 0–0.7)
EOSINOPHIL # BLD AUTO: 0.41 10*3/MM3 (ref 0–0.7)
EOSINOPHIL NFR BLD AUTO: 2.4 % (ref 0–7)
EOSINOPHIL NFR BLD AUTO: 2.9 % (ref 0–7)
ERYTHROCYTE [DISTWIDTH] IN BLOOD BY AUTOMATED COUNT: 14.2 % (ref 11.5–14.5)
ERYTHROCYTE [DISTWIDTH] IN BLOOD BY AUTOMATED COUNT: 14.5 % (ref 11.5–14.5)
GFR SERPL CREATININE-BSD FRML MDRD: 37 ML/MIN/1.73 (ref 51–120)
GLOBULIN UR ELPH-MCNC: 2.9 GM/DL (ref 2.3–3.5)
GLUCOSE BLD-MCNC: 126 MG/DL (ref 60–100)
GLUCOSE UR STRIP-MCNC: ABNORMAL MG/DL
GLUCOSE UR STRIP-MCNC: ABNORMAL MG/DL
HCT VFR BLD AUTO: 37.4 % (ref 35–45)
HCT VFR BLD AUTO: 37.8 % (ref 35–45)
HGB BLD-MCNC: 12.2 G/DL (ref 12–15.5)
HGB BLD-MCNC: 12.4 G/DL (ref 12–15.5)
HGB UR QL STRIP.AUTO: ABNORMAL
HYALINE CASTS UR QL AUTO: ABNORMAL /LPF
IMM GRANULOCYTES # BLD: 0.07 10*3/MM3 (ref 0–0.02)
IMM GRANULOCYTES NFR BLD: 0.5 % (ref 0–0.5)
KETONES UR QL STRIP: NEGATIVE
KETONES UR QL: NEGATIVE
LEUKOCYTE EST, POC: NEGATIVE
LEUKOCYTE ESTERASE UR QL STRIP.AUTO: ABNORMAL
LIPASE SERPL-CCNC: 22 U/L (ref 23–300)
LYMPHOCYTES # BLD AUTO: 2.65 10*3/MM3 (ref 0.6–4.2)
LYMPHOCYTES # BLD AUTO: 2.78 10*3/MM3 (ref 0.6–4.2)
LYMPHOCYTES NFR BLD AUTO: 18.5 % (ref 10–50)
LYMPHOCYTES NFR BLD AUTO: 18.9 % (ref 10–50)
MCH RBC QN AUTO: 27.9 PG (ref 26.5–34)
MCH RBC QN AUTO: 28.8 PG (ref 26.5–34)
MCHC RBC AUTO-ENTMCNC: 32.6 G/DL (ref 31.4–36)
MCHC RBC AUTO-ENTMCNC: 32.8 G/DL (ref 31.4–36)
MCV RBC AUTO: 85.6 FL (ref 80–98)
MCV RBC AUTO: 87.9 FL (ref 80–98)
MONOCYTES # BLD AUTO: 0.98 10*3/MM3 (ref 0–0.9)
MONOCYTES # BLD AUTO: 0.99 10*3/MM3 (ref 0–0.9)
MONOCYTES NFR BLD AUTO: 6.5 % (ref 0–12)
MONOCYTES NFR BLD AUTO: 7.1 % (ref 0–12)
NEUTROPHILS # BLD AUTO: 10.91 10*3/MM3 (ref 2–8.6)
NEUTROPHILS # BLD AUTO: 9.89 10*3/MM3 (ref 2–8.6)
NEUTROPHILS NFR BLD AUTO: 70.4 % (ref 37–80)
NEUTROPHILS NFR BLD AUTO: 72.4 % (ref 37–80)
NITRITE UR QL STRIP: NEGATIVE
NITRITE UR-MCNC: POSITIVE MG/ML
PH UR STRIP.AUTO: 5.5 [PH] (ref 5–9)
PH UR: 5 [PH] (ref 5–8)
PLATELET # BLD AUTO: 394 10*3/MM3 (ref 150–450)
PLATELET # BLD AUTO: 465 10*3/MM3 (ref 150–450)
PMV BLD AUTO: 9.3 FL (ref 8–12)
PMV BLD AUTO: 9.3 FL (ref 8–12)
POTASSIUM BLD-SCNC: 3.7 MMOL/L (ref 3.5–5.1)
PROT SERPL-MCNC: 7 G/DL (ref 6.3–8.6)
PROT UR QL STRIP: NEGATIVE
PROT UR STRIP-MCNC: NEGATIVE MG/DL
RBC # BLD AUTO: 4.3 10*6/MM3 (ref 3.77–5.16)
RBC # BLD AUTO: 4.37 10*6/MM3 (ref 3.77–5.16)
RBC # UR STRIP: ABNORMAL /UL
RBC # UR: ABNORMAL /HPF
REF LAB TEST METHOD: ABNORMAL
SODIUM BLD-SCNC: 136 MMOL/L (ref 137–145)
SP GR UR STRIP: 1.01 (ref 1–1.03)
SP GR UR: 1.02 (ref 1–1.03)
SQUAMOUS #/AREA URNS HPF: ABNORMAL /HPF
T4 FREE SERPL-MCNC: 1.22 NG/DL (ref 0.78–2.19)
TROPONIN I SERPL-MCNC: <0.012 NG/ML
TSH SERPL DL<=0.05 MIU/L-ACNC: 2.49 MIU/ML (ref 0.46–4.68)
UROBILINOGEN UR QL STRIP: ABNORMAL
UROBILINOGEN UR QL: NORMAL
WBC NRBC COR # BLD: 14.04 10*3/MM3 (ref 3.2–9.8)
WBC NRBC COR # BLD: 15.06 10*3/MM3 (ref 3.2–9.8)
WBC UR QL AUTO: ABNORMAL /HPF

## 2017-08-08 PROCEDURE — 81002 URINALYSIS NONAUTO W/O SCOPE: CPT | Performed by: FAMILY MEDICINE

## 2017-08-08 PROCEDURE — 87077 CULTURE AEROBIC IDENTIFY: CPT | Performed by: FAMILY MEDICINE

## 2017-08-08 PROCEDURE — 82553 CREATINE MB FRACTION: CPT | Performed by: FAMILY MEDICINE

## 2017-08-08 PROCEDURE — 36415 COLL VENOUS BLD VENIPUNCTURE: CPT | Performed by: FAMILY MEDICINE

## 2017-08-08 PROCEDURE — 25010000002 KETOROLAC TROMETHAMINE PER 15 MG: Performed by: FAMILY MEDICINE

## 2017-08-08 PROCEDURE — 81003 URINALYSIS AUTO W/O SCOPE: CPT | Performed by: FAMILY MEDICINE

## 2017-08-08 PROCEDURE — 87086 URINE CULTURE/COLONY COUNT: CPT | Performed by: FAMILY MEDICINE

## 2017-08-08 PROCEDURE — 84484 ASSAY OF TROPONIN QUANT: CPT | Performed by: FAMILY MEDICINE

## 2017-08-08 PROCEDURE — 99214 OFFICE O/P EST MOD 30 MIN: CPT | Performed by: FAMILY MEDICINE

## 2017-08-08 PROCEDURE — 84443 ASSAY THYROID STIM HORMONE: CPT | Performed by: FAMILY MEDICINE

## 2017-08-08 PROCEDURE — 85025 COMPLETE CBC W/AUTO DIFF WBC: CPT | Performed by: FAMILY MEDICINE

## 2017-08-08 PROCEDURE — 80053 COMPREHEN METABOLIC PANEL: CPT | Performed by: FAMILY MEDICINE

## 2017-08-08 PROCEDURE — 99284 EMERGENCY DEPT VISIT MOD MDM: CPT

## 2017-08-08 PROCEDURE — 82550 ASSAY OF CK (CPK): CPT | Performed by: FAMILY MEDICINE

## 2017-08-08 PROCEDURE — 84439 ASSAY OF FREE THYROXINE: CPT | Performed by: FAMILY MEDICINE

## 2017-08-08 PROCEDURE — 74176 CT ABD & PELVIS W/O CONTRAST: CPT

## 2017-08-08 PROCEDURE — 83690 ASSAY OF LIPASE: CPT | Performed by: FAMILY MEDICINE

## 2017-08-08 PROCEDURE — 87186 SC STD MICRODIL/AGAR DIL: CPT | Performed by: FAMILY MEDICINE

## 2017-08-08 PROCEDURE — 81001 URINALYSIS AUTO W/SCOPE: CPT | Performed by: FAMILY MEDICINE

## 2017-08-08 PROCEDURE — 96374 THER/PROPH/DIAG INJ IV PUSH: CPT

## 2017-08-08 RX ORDER — LORAZEPAM 1 MG/1
1 TABLET ORAL 2 TIMES DAILY PRN
Qty: 60 TABLET | Refills: 2 | Status: SHIPPED | OUTPATIENT
Start: 2017-08-08 | End: 2017-11-08 | Stop reason: SDUPTHER

## 2017-08-08 RX ORDER — HYDROCODONE BITARTRATE AND ACETAMINOPHEN 7.5; 325 MG/1; MG/1
1 TABLET ORAL EVERY 4 HOURS PRN
Qty: 180 TABLET | Refills: 0 | Status: SHIPPED | OUTPATIENT
Start: 2017-08-08 | End: 2017-09-12 | Stop reason: SDUPTHER

## 2017-08-08 RX ORDER — KETOROLAC TROMETHAMINE 30 MG/ML
30 INJECTION, SOLUTION INTRAMUSCULAR; INTRAVENOUS ONCE
Status: COMPLETED | OUTPATIENT
Start: 2017-08-08 | End: 2017-08-08

## 2017-08-08 RX ORDER — GABAPENTIN 600 MG/1
600 TABLET ORAL 3 TIMES DAILY
Qty: 90 TABLET | Refills: 5 | Status: SHIPPED | OUTPATIENT
Start: 2017-08-08 | End: 2017-11-08 | Stop reason: SDUPTHER

## 2017-08-08 RX ORDER — FLUTICASONE PROPIONATE 50 MCG
SPRAY, SUSPENSION (ML) NASAL
Qty: 16 ML | Refills: 0 | Status: SHIPPED | OUTPATIENT
Start: 2017-08-08 | End: 2017-12-06

## 2017-08-08 RX ADMIN — KETOROLAC TROMETHAMINE 30 MG: 30 INJECTION, SOLUTION INTRAMUSCULAR; INTRAVENOUS at 17:46

## 2017-08-08 NOTE — PROGRESS NOTES
Subjective   Chief Complaint   Patient presents with   • Pain     3 month follow up   • Med Refill     last dose this morning     Ariana Martinez is a 57 y.o. female.   Pain (3 month follow up) and Med Refill (last dose this morning)    History of Present Illness     Diabetic eye examination - scheduled in September    Diabetes - remains uncontrolled due to noncompliance and inability to tolerate medications  Average readings of FBG levels are 200-300   Currently prescribed januvia, novolog, tresiba  She voluntarily stopped januvia due to cost  Has previously failed metformin, bydureon, jardiance  Reviewed notes from Dr Kingston Tafoya's office on previous medications used    Chronic joint pain - controlled with norco  Due for uds today    MAURICIO - controlled with ativan PRN    Obesity - patient has been counseled by numerous providers on the significance of weight loss    C/o LLQ pain with low back pain about one week ago  Was recently treated with abx at Dr Damico's office  Today still complaining of LLQ pain  Not improved with anything  Worsened with movement      The following portions of the patient's history were reviewed and updated as appropriate: allergies, current medications, past family history, past medical history, past social history, past surgical history and problem list.    Review of Systems   Constitutional: Negative for appetite change, chills, fatigue and fever.   HENT: Negative for congestion, ear pain, rhinorrhea and sore throat.    Eyes: Negative for pain.   Respiratory: Negative for cough and shortness of breath.    Cardiovascular: Negative for chest pain and palpitations.   Gastrointestinal: Positive for abdominal pain. Negative for constipation, diarrhea and nausea.   Genitourinary: Negative for dysuria.   Musculoskeletal: Positive for arthralgias and back pain. Negative for joint swelling and neck pain.   Skin: Negative for rash.   Neurological: Negative for dizziness and headaches.  "      Objective   /72  Pulse 79  Temp 97.4 °F (36.3 °C)  Ht 68\" (172.7 cm)  Wt 266 lb (121 kg)  SpO2 95%  BMI 40.45 kg/m2  Physical Exam   Constitutional: She is oriented to person, place, and time. She appears well-developed and well-nourished.   HENT:   Head: Normocephalic and atraumatic.   Eyes: Pupils are equal, round, and reactive to light.   Neck: Normal range of motion. Neck supple.   Cardiovascular: Normal rate, regular rhythm and normal heart sounds.    Pulmonary/Chest: Effort normal and breath sounds normal. No respiratory distress. She has no wheezes. She has no rales.   Abdominal: Soft. Bowel sounds are normal. There is tenderness in the left lower quadrant.   Central obesity  Abdominal glucose monitor in place on the left abdominal wall   Musculoskeletal:        Lumbar back: She exhibits decreased range of motion and pain.   Neurological: She is alert and oriented to person, place, and time.   Skin: Skin is warm and dry.   Psychiatric: She has a normal mood and affect.   Nursing note and vitals reviewed.      Assessment/Plan   Problems Addressed this Visit        Digestive    Morbid obesity due to excess calories       Other    MAURICIO (generalized anxiety disorder) - Primary    Relevant Medications    LORazepam (ATIVAN) 1 MG tablet    Chronic pain disorder    Relevant Medications    HYDROcodone-acetaminophen (NORCO) 7.5-325 MG per tablet      Other Visit Diagnoses     Encounter for screening mammogram for malignant neoplasm of breast        Relevant Orders    Mammo Screening Digital Tomosynthesis Bilateral With CAD    Hematuria        Relevant Orders    XR Abdomen KUB (Completed)    CBC & Differential (Completed)    CBC Auto Differential (Completed)    LLQ pain        Relevant Orders    POC Urinalysis Dipstick (Completed)    Urine Culture        Mammogram and pelvic exam scheduled    Refilled norco    Adjusted ativan    Refilled neurontin    Updated controlled contract  perla reviewed " [id number]  lester reviewed  LESTER query complete. Treatment plan to include limited course of prescribed  controlled substance. Risks including addiction, benefits, and alternatives presented to patient.   uds today    kub ordered  Cbc ordered - elevated wbc  Urinalysis - positive  Urine culture ordered  **pending patient phone call for her recent abx prescription  Patient called and recent abx use was with ciprofloxacin 500mg BID x 10 days  During the completion of her note from her visit today she is actively in the emergency room  Will hold on sending in an antibiotic as to refrain from double treatment depending on her ER diagnosis    Recheck in 3 months

## 2017-08-08 NOTE — ED NOTES
Pt to bathroom ambulatory with standby assist, urine sample collected and sent to lab      Rafaela Murphy RN  08/08/17 [id number]

## 2017-08-08 NOTE — ED PROVIDER NOTES
Subjective   Patient is a 57 y.o. female presenting with abdominal pain.   History provided by:  Patient  Abdominal Pain   Pain location:  L flank  Pain quality: aching, dull and pressure    Pain radiates to:  Back and groin  Pain severity:  Severe  Onset quality:  Sudden  Duration:  2 weeks  Timing:  Intermittent  Progression:  Waxing and waning  Chronicity:  New  Context: not alcohol use, not awakening from sleep, not diet changes, not medication withdrawal and not previous surgeries    Relieved by:  Nothing  Worsened by:  Movement  Associated symptoms: dysuria    Associated symptoms: no anorexia, no belching, no chest pain, no chills, no cough, no fatigue, no fever, no hematuria, no melena, no nausea and no shortness of breath    Risk factors: no alcohol abuse, no aspirin use, has not had multiple surgeries and no NSAID use        Review of Systems   Constitutional: Negative for activity change, appetite change, chills, diaphoresis, fatigue, fever and unexpected weight change.   HENT: Negative for congestion, dental problem, drooling, ear discharge, ear pain and facial swelling.    Respiratory: Negative for apnea, cough, choking, chest tightness and shortness of breath.    Cardiovascular: Negative for chest pain.   Gastrointestinal: Positive for abdominal pain. Negative for anorexia, melena and nausea.   Endocrine: Negative for cold intolerance, heat intolerance, polydipsia and polyphagia.   Genitourinary: Positive for dysuria. Negative for difficulty urinating, dyspareunia, enuresis, flank pain, frequency and hematuria.   Musculoskeletal: Negative for arthralgias, back pain, gait problem and joint swelling.   Skin: Negative for color change, pallor, rash and wound.   Neurological: Negative for dizziness, tremors, seizures, syncope, facial asymmetry, speech difficulty, light-headedness, numbness and headaches.   Psychiatric/Behavioral: Negative for agitation, behavioral problems, confusion, decreased  concentration, dysphoric mood and hallucinations. The patient is not nervous/anxious and is not hyperactive.        Past Medical History:   Diagnosis Date   • Acquired hypothyroidism    • Angina, class IV    • Anxiety    • Benign paroxysmal positional vertigo    • Carpal tunnel syndrome    • Chronic pain    • Coronary atherosclerosis    • Depression    • Diabetes mellitus     Type 2, controlled   • Diabetic polyneuropathy    • Female stress incontinence    • Hashimoto's thyroiditis    • Hyperlipidemia    • Hypertension    • Low back pain    • Malaise and fatigue    • Multiple joint pain    • Obesity     Refuses to be weighed   • Otalgia     Both   • Perforation of tympanic membrane     Left   • Postoperative wound infection    • Vitamin D deficiency        Allergies   Allergen Reactions   • Seroquel [Quetiapine Fumarate] Anaphylaxis   • Avandia [Rosiglitazone] Swelling   • Morphine And Related Hallucinations     If patient takes to much       Past Surgical History:   Procedure Laterality Date   • ABDOMINAL SURGERY     • ANGIOPLASTY      coronary   • CARDIAC CATHETERIZATION     • CARDIAC CATHETERIZATION N/A 6/20/2017    Procedure: Right Heart Cath;  Surgeon: Can Kwon MD PhD;  Location: [facility] INVASIVE LOCATION;  Service:    • CARPAL TUNNEL RELEASE     • CHOLECYSTECTOMY     • CORONARY ARTERY BYPASS GRAFT     • ENDOSCOPY AND COLONOSCOPY     • FOOT SURGERY      Toes   • GASTRIC BANDING      Revision, laparoscopic   • HYSTERECTOMY     • MOUTH SURGERY     • SALPINGO OOPHORECTOMY     • SHOULDER SURGERY     • TRANSESOPHAGEAL ECHOCARDIOGRAM (LAMONTE)      With color flow       Family History   Problem Relation Age of Onset   • Diabetes Other    • Heart disease Other    • Hypertension Other        Social History     Social History   • Marital status:      Spouse name: N/A   • Number of children: N/A   • Years of education: N/A     Social History Main Topics   • Smoking status: Never Smoker   • Smokeless  "tobacco: Never Used   • Alcohol use No   • Drug use: No   • Sexual activity: Defer     Other Topics Concern   • None     Social History Narrative           Objective    /59 (BP Location: Left arm, Patient Position: Lying)  Pulse 77  Temp 98.1 °F (36.7 °C) (Temporal Artery )   Resp 20  Ht 68\" (172.7 cm)  Wt 266 lb (121 kg)  SpO2 94%  BMI 40.45 kg/m2    Physical Exam   Constitutional: She is oriented to person, place, and time. She appears well-developed and well-nourished.   HENT:   Head: Normocephalic and atraumatic.   Right Ear: External ear normal.   Left Ear: External ear normal.   Nose: Nose normal.   Mouth/Throat: Oropharynx is clear and moist.   Eyes: Conjunctivae and EOM are normal. Pupils are equal, round, and reactive to light.   Neck: Normal range of motion. Neck supple.   Cardiovascular: Normal rate, regular rhythm, normal heart sounds and intact distal pulses.    Pulmonary/Chest: Effort normal and breath sounds normal. No accessory muscle usage. No respiratory distress. She exhibits no tenderness and no deformity.   Abdominal: Soft. Bowel sounds are normal. There is tenderness in the left upper quadrant.       Musculoskeletal: Normal range of motion.   Neurological: She is alert and oriented to person, place, and time. She has normal reflexes.   Skin: Skin is warm.   Psychiatric: She has a normal mood and affect. Her behavior is normal. Judgment and thought content normal.   Nursing note and vitals reviewed.      Procedures         ED Course  ED Course      Labs Reviewed   COMPREHENSIVE METABOLIC PANEL - Abnormal; Notable for the following:        Result Value    Glucose 126 (*)     BUN 26 (*)     Creatinine 1.48 (*)     Sodium 136 (*)     Chloride 93 (*)     AST (SGOT) 41 (*)     Alkaline Phosphatase 137 (*)     eGFR Non  Amer 37 (*)     All other components within normal limits   LIPASE - Abnormal; Notable for the following:     Lipase 22 (*)     All other components within normal " limits   URINALYSIS W/ CULTURE IF INDICATED - Abnormal; Notable for the following:     Appearance, UA Cloudy (*)     Glucose,  mg/dL (Trace) (*)     Blood, UA Small (1+) (*)     Leuk Esterase, UA Trace (*)     All other components within normal limits   CBC WITH AUTO DIFFERENTIAL - Abnormal; Notable for the following:     WBC 14.04 (*)     Neutrophils, Absolute 9.89 (*)     Monocytes, Absolute 0.99 (*)     Immature Grans, Absolute 0.07 (*)     All other components within normal limits   URINALYSIS, MICROSCOPIC ONLY - Abnormal; Notable for the following:     RBC, UA 3-5 (*)     WBC, UA 6-12 (*)     Bacteria, UA 1+ (*)     Squamous Epithelial Cells, UA 13-20 (*)     All other components within normal limits   URINE CULTURE - Normal   CK - Normal   CK MB - Normal   TROPONIN (IN-HOUSE) - Normal   T4, FREE - Normal   TSH - Normal   CBC AND DIFFERENTIAL    Narrative:     The following orders were created for panel order CBC & Differential.  Procedure                               Abnormality         Status                     ---------                               -----------         ------                     CBC Auto Differential[[id number]]        Abnormal            Final result                 Please view results for these tests on the individual orders.     Ct Abdomen Pelvis Without Contrast    Result Date: 8/8/2017  Narrative: EXAM:  Computed Tomography REGION:  Abdomen and Pelvis INDICATION:  Left flank pain COMPARISON:  none TECHNIQUE:    - stone protocol    - contrast:  none This exam was performed according to the departmental dose-optimization program which includes automated exposure control, adjustment of the mA and/or kV according to patient size and/or use of iterative reconstruction technique.     COMMENTS: - - - CT THORAX (inferior, visualized portions): - - -   - lung bases:  negative   - pleura:  negative   - misc:  unremarkable  - - -CT ABDOMEN: - - -   - Kidney, RIGHT:     - size:  normal ;  contour:  normal      - perinephric stranding:  none     - nephrolithiasis:  none     - collecting system: not dilated     - misc:   - Ureter, RIGHT:     - caliber:  normal     - course:  normal     - ureterolithiasis:  none   - Kidney, LEFT:      - size:  normal ; contour:  normal       - perinephric stranding:  none      - nephrolithiasis:  none      - collecting system: not dilated      - misc:  - Ureter, LEFT:     - caliber:  normal     - course:  normal     - ureterolithiasis:  none   - Misc:      - solid organs (limited due to the lack of intravenous contrast):  grossly negative     - visualized bowel (limited due to the lack of oral contrast):  grossly negative     - misc: - - -CT Pelvis: - - -    - Bladder:  no gross evidence of pathology.    - Misc:       - solid organs (limited due to the lack of intravenous contrast):  grossly negative       - visualized bowel (limited due to the lack of oral contrast):  grossly negative       - misc:   .             Impression: CONCLUSION: 1.  No evidence of nephro / uretero lithiasis.       Electronically signed by:  ASHLEY Mejia MD  8/8/2017 6:15 PM CDT Workstation: [id number]    Xr Abdomen Kub    Result Date: 8/8/2017  Narrative: [facility] [place] Patient Name: CHIQUITA BAILEY ORDERING: LISA SUTTON ATTENDING: LISA SUTTON REFERRING: LISA SUTTON ----------------------- PROCEDURE: Single view abdomen/KUB COMPARISON: No comparison HISTORY: LLQ pain, hematuria, rule out kidney stone, R31.9 Hematuria, unspecified TECHNIQUE: Single frontal view of the abdomen and pelvis. FINDINGS: Hazy opacification in the lower lungs may be artifactual versus consolidation, atelectasis, or small bilateral pleural effusions. There is a left sacral nerve stimulator. There is fecal debris and bowel gas in nondilated colon. Nonobstructive small bowel gas pattern. No suspicious calcifications.     Impression: CONCLUSION:  Hazy opacification in the lower  lungs may be artifactual versus consolidation, atelectasis, or small bilateral pleural effusions. Electronically signed by:  Vamshi Rose MD  8/8/2017 10:00 AM CDT Workstation: [id number]    Normal vitals .   Left flank pain- ct scan reveals no calculus.   Discussed findings with pt and reassured . Possible may have passed the stone .   Advised to drink plenty of water and outpt follow up with pcp.               MDM    Final diagnoses:   Left flank pain   Acute cystitis with hematuria            Kylie Hoffmann MD  08/09/17 [id number]

## 2017-08-10 LAB
BACTERIA SPEC AEROBE CULT: NORMAL
BACTERIA SPEC AEROBE CULT: NORMAL

## 2017-08-11 LAB — BACTERIA SPEC AEROBE CULT: ABNORMAL

## 2017-08-22 RX ORDER — MIRTAZAPINE 45 MG/1
TABLET, FILM COATED ORAL
Qty: 30 TABLET | Refills: 5 | Status: SHIPPED | OUTPATIENT
Start: 2017-08-22 | End: 2018-02-05 | Stop reason: SDUPTHER

## 2017-08-27 ENCOUNTER — APPOINTMENT (OUTPATIENT)
Dept: GENERAL RADIOLOGY | Facility: HOSPITAL | Age: 57
End: 2017-08-27

## 2017-08-27 ENCOUNTER — HOSPITAL ENCOUNTER (OUTPATIENT)
Facility: HOSPITAL | Age: 57
Setting detail: OBSERVATION
Discharge: HOME | End: 2017-08-28
Attending: EMERGENCY MEDICINE | Admitting: STUDENT IN AN ORGANIZED HEALTH CARE EDUCATION/TRAINING PROGRAM

## 2017-08-27 DIAGNOSIS — R07.9 CHEST PAIN, UNSPECIFIED TYPE: ICD-10-CM

## 2017-08-27 DIAGNOSIS — E16.2 HYPOGLYCEMIA: Primary | ICD-10-CM

## 2017-08-27 DIAGNOSIS — E87.6 HYPOKALEMIA: ICD-10-CM

## 2017-08-27 PROBLEM — I50.30 (HFPEF) HEART FAILURE WITH PRESERVED EJECTION FRACTION: Status: ACTIVE | Noted: 2017-08-27

## 2017-08-27 PROBLEM — E11.42 DIABETIC PERIPHERAL NEUROPATHY ASSOCIATED WITH TYPE 2 DIABETES MELLITUS (HCC): Status: ACTIVE | Noted: 2017-08-27

## 2017-08-27 PROBLEM — N18.30 CHRONIC KIDNEY DISEASE, STAGE III (MODERATE): Status: ACTIVE | Noted: 2017-08-27

## 2017-08-27 LAB
ALBUMIN SERPL-MCNC: 3.7 G/DL (ref 3.4–4.8)
ALBUMIN/GLOB SERPL: 1.3 G/DL (ref 1.1–1.8)
ALP SERPL-CCNC: 94 U/L (ref 38–126)
ALT SERPL W P-5'-P-CCNC: 28 U/L (ref 9–52)
ANION GAP SERPL CALCULATED.3IONS-SCNC: 12 MMOL/L (ref 5–15)
APTT PPP: 24.2 SECONDS (ref 20–40.3)
AST SERPL-CCNC: 33 U/L (ref 14–36)
BASOPHILS # BLD AUTO: 0.04 10*3/MM3 (ref 0–0.2)
BASOPHILS NFR BLD AUTO: 0.3 % (ref 0–2)
BILIRUB SERPL-MCNC: 0.7 MG/DL (ref 0.2–1.3)
BUN BLD-MCNC: 22 MG/DL (ref 7–21)
BUN/CREAT SERPL: 15.6 (ref 7–25)
CALCIUM SPEC-SCNC: 9.1 MG/DL (ref 8.4–10.2)
CHLORIDE SERPL-SCNC: 97 MMOL/L (ref 95–110)
CK MB SERPL-CCNC: 3.09 NG/ML (ref 0–5)
CK SERPL-CCNC: 119 U/L (ref 30–135)
CO2 SERPL-SCNC: 29 MMOL/L (ref 22–31)
CREAT BLD-MCNC: 1.41 MG/DL (ref 0.5–1)
D-DIMER, QUANTITATIVE (MAD,POW, STR): 405 NG/ML (FEU) (ref 0–470)
DEPRECATED RDW RBC AUTO: 45.9 FL (ref 36.4–46.3)
EOSINOPHIL # BLD AUTO: 0.35 10*3/MM3 (ref 0–0.7)
EOSINOPHIL NFR BLD AUTO: 3 % (ref 0–7)
ERYTHROCYTE [DISTWIDTH] IN BLOOD BY AUTOMATED COUNT: 14.8 % (ref 11.5–14.5)
GFR SERPL CREATININE-BSD FRML MDRD: 39 ML/MIN/1.73 (ref 51–120)
GLOBULIN UR ELPH-MCNC: 2.9 GM/DL (ref 2.3–3.5)
GLUCOSE BLD-MCNC: 57 MG/DL (ref 60–100)
GLUCOSE BLDC GLUCOMTR-MCNC: 134 MG/DL (ref 70–130)
GLUCOSE BLDC GLUCOMTR-MCNC: 171 MG/DL (ref 70–130)
HCT VFR BLD AUTO: 34.5 % (ref 35–45)
HGB BLD-MCNC: 11.5 G/DL (ref 12–15.5)
HOLD SPECIMEN: NORMAL
IMM GRANULOCYTES # BLD: 0.03 10*3/MM3 (ref 0–0.02)
IMM GRANULOCYTES NFR BLD: 0.3 % (ref 0–0.5)
INR PPP: 1 (ref 0.8–1.2)
LYMPHOCYTES # BLD AUTO: 3.2 10*3/MM3 (ref 0.6–4.2)
LYMPHOCYTES NFR BLD AUTO: 27.4 % (ref 10–50)
MAGNESIUM SERPL-MCNC: 1.7 MG/DL (ref 1.6–2.3)
MCH RBC QN AUTO: 28.5 PG (ref 26.5–34)
MCHC RBC AUTO-ENTMCNC: 33.3 G/DL (ref 31.4–36)
MCV RBC AUTO: 85.4 FL (ref 80–98)
MONOCYTES # BLD AUTO: 0.92 10*3/MM3 (ref 0–0.9)
MONOCYTES NFR BLD AUTO: 7.9 % (ref 0–12)
NEUTROPHILS # BLD AUTO: 7.14 10*3/MM3 (ref 2–8.6)
NEUTROPHILS NFR BLD AUTO: 61.1 % (ref 37–80)
PLATELET # BLD AUTO: 368 10*3/MM3 (ref 150–450)
PMV BLD AUTO: 9.6 FL (ref 8–12)
POTASSIUM BLD-SCNC: 3 MMOL/L (ref 3.5–5.1)
PROT SERPL-MCNC: 6.6 G/DL (ref 6.3–8.6)
PROTHROMBIN TIME: 13.1 SECONDS (ref 11.1–15.3)
RBC # BLD AUTO: 4.04 10*6/MM3 (ref 3.77–5.16)
SODIUM BLD-SCNC: 138 MMOL/L (ref 137–145)
TROPONIN I SERPL-MCNC: <0.012 NG/ML
TROPONIN I SERPL-MCNC: <0.012 NG/ML
WBC NRBC COR # BLD: 11.68 10*3/MM3 (ref 3.2–9.8)
WHOLE BLOOD HOLD SPECIMEN: NORMAL

## 2017-08-27 PROCEDURE — 99284 EMERGENCY DEPT VISIT MOD MDM: CPT

## 2017-08-27 PROCEDURE — 82553 CREATINE MB FRACTION: CPT | Performed by: EMERGENCY MEDICINE

## 2017-08-27 PROCEDURE — 84484 ASSAY OF TROPONIN QUANT: CPT | Performed by: FAMILY MEDICINE

## 2017-08-27 PROCEDURE — 25010000002 ONDANSETRON PER 1 MG: Performed by: EMERGENCY MEDICINE

## 2017-08-27 PROCEDURE — G0378 HOSPITAL OBSERVATION PER HR: HCPCS

## 2017-08-27 PROCEDURE — 93005 ELECTROCARDIOGRAM TRACING: CPT

## 2017-08-27 PROCEDURE — 82962 GLUCOSE BLOOD TEST: CPT

## 2017-08-27 PROCEDURE — 96361 HYDRATE IV INFUSION ADD-ON: CPT

## 2017-08-27 PROCEDURE — 99219 PR INITIAL OBSERVATION CARE/DAY 50 MINUTES: CPT | Performed by: FAMILY MEDICINE

## 2017-08-27 PROCEDURE — 85610 PROTHROMBIN TIME: CPT | Performed by: EMERGENCY MEDICINE

## 2017-08-27 PROCEDURE — 85025 COMPLETE CBC W/AUTO DIFF WBC: CPT | Performed by: EMERGENCY MEDICINE

## 2017-08-27 PROCEDURE — 85379 FIBRIN DEGRADATION QUANT: CPT | Performed by: EMERGENCY MEDICINE

## 2017-08-27 PROCEDURE — 96374 THER/PROPH/DIAG INJ IV PUSH: CPT

## 2017-08-27 PROCEDURE — 71010 HC CHEST PA OR AP: CPT

## 2017-08-27 PROCEDURE — 84484 ASSAY OF TROPONIN QUANT: CPT | Performed by: EMERGENCY MEDICINE

## 2017-08-27 PROCEDURE — 85730 THROMBOPLASTIN TIME PARTIAL: CPT | Performed by: EMERGENCY MEDICINE

## 2017-08-27 PROCEDURE — 80053 COMPREHEN METABOLIC PANEL: CPT | Performed by: EMERGENCY MEDICINE

## 2017-08-27 PROCEDURE — 36415 COLL VENOUS BLD VENIPUNCTURE: CPT

## 2017-08-27 PROCEDURE — 83735 ASSAY OF MAGNESIUM: CPT | Performed by: FAMILY MEDICINE

## 2017-08-27 PROCEDURE — 82550 ASSAY OF CK (CPK): CPT | Performed by: EMERGENCY MEDICINE

## 2017-08-27 PROCEDURE — 93010 ELECTROCARDIOGRAM REPORT: CPT | Performed by: INTERNAL MEDICINE

## 2017-08-27 PROCEDURE — 96375 TX/PRO/DX INJ NEW DRUG ADDON: CPT

## 2017-08-27 PROCEDURE — 83036 HEMOGLOBIN GLYCOSYLATED A1C: CPT | Performed by: FAMILY MEDICINE

## 2017-08-27 RX ORDER — ARIPIPRAZOLE 15 MG/1
15 TABLET ORAL DAILY
Status: DISCONTINUED | OUTPATIENT
Start: 2017-08-28 | End: 2017-08-28 | Stop reason: HOSPADM

## 2017-08-27 RX ORDER — MIRTAZAPINE 15 MG/1
45 TABLET, FILM COATED ORAL NIGHTLY
Status: DISCONTINUED | OUTPATIENT
Start: 2017-08-27 | End: 2017-08-28 | Stop reason: HOSPADM

## 2017-08-27 RX ORDER — FLUTICASONE PROPIONATE 50 MCG
2 SPRAY, SUSPENSION (ML) NASAL DAILY
Status: DISCONTINUED | OUTPATIENT
Start: 2017-08-28 | End: 2017-08-28 | Stop reason: HOSPADM

## 2017-08-27 RX ORDER — GABAPENTIN 300 MG/1
600 CAPSULE ORAL EVERY 8 HOURS SCHEDULED
Status: DISCONTINUED | OUTPATIENT
Start: 2017-08-27 | End: 2017-08-28 | Stop reason: HOSPADM

## 2017-08-27 RX ORDER — SODIUM CHLORIDE 0.9 % (FLUSH) 0.9 %
1-10 SYRINGE (ML) INJECTION AS NEEDED
Status: DISCONTINUED | OUTPATIENT
Start: 2017-08-27 | End: 2017-08-28 | Stop reason: HOSPADM

## 2017-08-27 RX ORDER — ATORVASTATIN CALCIUM 40 MG/1
40 TABLET, FILM COATED ORAL NIGHTLY
Status: DISCONTINUED | OUTPATIENT
Start: 2017-08-27 | End: 2017-08-28 | Stop reason: HOSPADM

## 2017-08-27 RX ORDER — PANTOPRAZOLE SODIUM 40 MG/1
40 TABLET, DELAYED RELEASE ORAL DAILY
Status: DISCONTINUED | OUTPATIENT
Start: 2017-08-28 | End: 2017-08-28 | Stop reason: HOSPADM

## 2017-08-27 RX ORDER — HYDROCODONE BITARTRATE AND ACETAMINOPHEN 7.5; 325 MG/1; MG/1
1 TABLET ORAL EVERY 4 HOURS PRN
Status: DISCONTINUED | OUTPATIENT
Start: 2017-08-27 | End: 2017-08-28 | Stop reason: HOSPADM

## 2017-08-27 RX ORDER — DEXTROSE MONOHYDRATE 25 G/50ML
50 INJECTION, SOLUTION INTRAVENOUS
Status: DISCONTINUED | OUTPATIENT
Start: 2017-08-27 | End: 2017-08-28 | Stop reason: HOSPADM

## 2017-08-27 RX ORDER — POTASSIUM CHLORIDE 750 MG/1
40 CAPSULE, EXTENDED RELEASE ORAL DAILY
Status: DISCONTINUED | OUTPATIENT
Start: 2017-08-28 | End: 2017-08-27

## 2017-08-27 RX ORDER — VENLAFAXINE HYDROCHLORIDE 75 MG/1
150 CAPSULE, EXTENDED RELEASE ORAL
Status: DISCONTINUED | OUTPATIENT
Start: 2017-08-28 | End: 2017-08-28 | Stop reason: HOSPADM

## 2017-08-27 RX ORDER — POTASSIUM CHLORIDE 750 MG/1
40 CAPSULE, EXTENDED RELEASE ORAL AS NEEDED
Status: DISCONTINUED | OUTPATIENT
Start: 2017-08-27 | End: 2017-08-28 | Stop reason: HOSPADM

## 2017-08-27 RX ORDER — ASPIRIN 81 MG/1
81 TABLET, CHEWABLE ORAL DAILY
Status: DISCONTINUED | OUTPATIENT
Start: 2017-08-28 | End: 2017-08-28 | Stop reason: HOSPADM

## 2017-08-27 RX ORDER — CHLORTHALIDONE 25 MG/1
25 TABLET ORAL DAILY
Status: DISCONTINUED | OUTPATIENT
Start: 2017-08-28 | End: 2017-08-28 | Stop reason: HOSPADM

## 2017-08-27 RX ORDER — RANOLAZINE 500 MG/1
1000 TABLET, EXTENDED RELEASE ORAL 2 TIMES DAILY
Status: DISCONTINUED | OUTPATIENT
Start: 2017-08-28 | End: 2017-08-28 | Stop reason: HOSPADM

## 2017-08-27 RX ORDER — ASPIRIN 81 MG/1
324 TABLET, CHEWABLE ORAL ONCE
Status: COMPLETED | OUTPATIENT
Start: 2017-08-27 | End: 2017-08-27

## 2017-08-27 RX ORDER — NICOTINE POLACRILEX 4 MG
15 LOZENGE BUCCAL
Status: DISCONTINUED | OUTPATIENT
Start: 2017-08-27 | End: 2017-08-28 | Stop reason: HOSPADM

## 2017-08-27 RX ORDER — DEXTROSE MONOHYDRATE 25 G/50ML
25 INJECTION, SOLUTION INTRAVENOUS
Status: DISCONTINUED | OUTPATIENT
Start: 2017-08-27 | End: 2017-08-28 | Stop reason: HOSPADM

## 2017-08-27 RX ORDER — NITROGLYCERIN 0.4 MG/1
0.4 TABLET SUBLINGUAL
Status: DISCONTINUED | OUTPATIENT
Start: 2017-08-27 | End: 2017-08-28 | Stop reason: HOSPADM

## 2017-08-27 RX ORDER — METOPROLOL SUCCINATE 25 MG/1
25 TABLET, EXTENDED RELEASE ORAL DAILY
Status: DISCONTINUED | OUTPATIENT
Start: 2017-08-28 | End: 2017-08-28 | Stop reason: HOSPADM

## 2017-08-27 RX ORDER — SODIUM CHLORIDE 9 MG/ML
125 INJECTION, SOLUTION INTRAVENOUS CONTINUOUS
Status: DISCONTINUED | OUTPATIENT
Start: 2017-08-27 | End: 2017-08-28 | Stop reason: HOSPADM

## 2017-08-27 RX ORDER — BISACODYL 5 MG/1
5 TABLET, DELAYED RELEASE ORAL DAILY PRN
Status: DISCONTINUED | OUTPATIENT
Start: 2017-08-27 | End: 2017-08-28 | Stop reason: HOSPADM

## 2017-08-27 RX ORDER — ACETAMINOPHEN 325 MG/1
650 TABLET ORAL EVERY 4 HOURS PRN
Status: DISCONTINUED | OUTPATIENT
Start: 2017-08-27 | End: 2017-08-28 | Stop reason: HOSPADM

## 2017-08-27 RX ORDER — CLOPIDOGREL BISULFATE 75 MG/1
75 TABLET ORAL DAILY
Status: DISCONTINUED | OUTPATIENT
Start: 2017-08-28 | End: 2017-08-28 | Stop reason: HOSPADM

## 2017-08-27 RX ORDER — ONDANSETRON 2 MG/ML
4 INJECTION INTRAMUSCULAR; INTRAVENOUS ONCE
Status: COMPLETED | OUTPATIENT
Start: 2017-08-27 | End: 2017-08-27

## 2017-08-27 RX ORDER — POTASSIUM CHLORIDE 1.5 G/1.77G
40 POWDER, FOR SOLUTION ORAL AS NEEDED
Status: DISCONTINUED | OUTPATIENT
Start: 2017-08-27 | End: 2017-08-28 | Stop reason: HOSPADM

## 2017-08-27 RX ORDER — LORAZEPAM 1 MG/1
1 TABLET ORAL 2 TIMES DAILY PRN
Status: DISCONTINUED | OUTPATIENT
Start: 2017-08-27 | End: 2017-08-28 | Stop reason: HOSPADM

## 2017-08-27 RX ORDER — FUROSEMIDE 40 MG/1
40 TABLET ORAL DAILY
Status: DISCONTINUED | OUTPATIENT
Start: 2017-08-28 | End: 2017-08-28 | Stop reason: HOSPADM

## 2017-08-27 RX ORDER — SODIUM CHLORIDE 0.9 % (FLUSH) 0.9 %
10 SYRINGE (ML) INJECTION AS NEEDED
Status: DISCONTINUED | OUTPATIENT
Start: 2017-08-27 | End: 2017-08-28 | Stop reason: HOSPADM

## 2017-08-27 RX ADMIN — Medication 10 ML: at 19:21

## 2017-08-27 RX ADMIN — ONDANSETRON 4 MG: 2 INJECTION INTRAMUSCULAR; INTRAVENOUS at 20:11

## 2017-08-27 RX ADMIN — ASPIRIN 81 MG 324 MG: 81 TABLET ORAL at 19:19

## 2017-08-27 RX ADMIN — POTASSIUM CHLORIDE 40 MEQ: 750 CAPSULE, EXTENDED RELEASE ORAL at 21:11

## 2017-08-27 RX ADMIN — SODIUM CHLORIDE 125 ML/HR: 9 INJECTION, SOLUTION INTRAVENOUS at 19:19

## 2017-08-27 RX ADMIN — DEXTROSE MONOHYDRATE 50 ML: 500 INJECTION PARENTERAL at 21:04

## 2017-08-28 VITALS
HEIGHT: 68 IN | WEIGHT: 270 LBS | RESPIRATION RATE: 20 BRPM | SYSTOLIC BLOOD PRESSURE: 98 MMHG | DIASTOLIC BLOOD PRESSURE: 54 MMHG | TEMPERATURE: 96.7 F | HEART RATE: 68 BPM | BODY MASS INDEX: 40.92 KG/M2 | OXYGEN SATURATION: 97 %

## 2017-08-28 PROBLEM — E16.2 HYPOGLYCEMIA: Status: RESOLVED | Noted: 2017-08-27 | Resolved: 2017-08-28

## 2017-08-28 PROBLEM — E87.6 HYPOKALEMIA: Status: RESOLVED | Noted: 2017-08-27 | Resolved: 2017-08-28

## 2017-08-28 LAB
ALBUMIN SERPL-MCNC: 3.3 G/DL (ref 3.4–4.8)
ALBUMIN/GLOB SERPL: 1.2 G/DL (ref 1.1–1.8)
ALP SERPL-CCNC: 102 U/L (ref 38–126)
ALT SERPL W P-5'-P-CCNC: 30 U/L (ref 9–52)
ANION GAP SERPL CALCULATED.3IONS-SCNC: 12 MMOL/L (ref 5–15)
AST SERPL-CCNC: 19 U/L (ref 14–36)
BASOPHILS # BLD AUTO: 0.04 10*3/MM3 (ref 0–0.2)
BASOPHILS NFR BLD AUTO: 0.3 % (ref 0–2)
BILIRUB SERPL-MCNC: 0.5 MG/DL (ref 0.2–1.3)
BUN BLD-MCNC: 20 MG/DL (ref 7–21)
BUN/CREAT SERPL: 15.4 (ref 7–25)
CALCIUM SPEC-SCNC: 8.4 MG/DL (ref 8.4–10.2)
CHLORIDE SERPL-SCNC: 99 MMOL/L (ref 95–110)
CO2 SERPL-SCNC: 25 MMOL/L (ref 22–31)
CREAT BLD-MCNC: 1.3 MG/DL (ref 0.5–1)
DEPRECATED RDW RBC AUTO: 45.1 FL (ref 36.4–46.3)
EOSINOPHIL # BLD AUTO: 0.3 10*3/MM3 (ref 0–0.7)
EOSINOPHIL NFR BLD AUTO: 2.4 % (ref 0–7)
ERYTHROCYTE [DISTWIDTH] IN BLOOD BY AUTOMATED COUNT: 14.5 % (ref 11.5–14.5)
GFR SERPL CREATININE-BSD FRML MDRD: 43 ML/MIN/1.73 (ref 60–120)
GLOBULIN UR ELPH-MCNC: 2.7 GM/DL (ref 2.3–3.5)
GLUCOSE BLD-MCNC: 258 MG/DL (ref 60–100)
GLUCOSE BLDC GLUCOMTR-MCNC: 218 MG/DL (ref 70–130)
GLUCOSE BLDC GLUCOMTR-MCNC: 219 MG/DL (ref 70–130)
GLUCOSE BLDC GLUCOMTR-MCNC: 42 MG/DL (ref 70–130)
HBA1C MFR BLD: 7.3 % (ref 4–5.6)
HCT VFR BLD AUTO: 34.3 % (ref 35–45)
HGB BLD-MCNC: 11.4 G/DL (ref 12–15.5)
HOLD SPECIMEN: NORMAL
IMM GRANULOCYTES # BLD: 0.07 10*3/MM3 (ref 0–0.02)
IMM GRANULOCYTES NFR BLD: 0.6 % (ref 0–0.5)
LYMPHOCYTES # BLD AUTO: 2.36 10*3/MM3 (ref 0.6–4.2)
LYMPHOCYTES NFR BLD AUTO: 19 % (ref 10–50)
MCH RBC QN AUTO: 28.5 PG (ref 26.5–34)
MCHC RBC AUTO-ENTMCNC: 33.2 G/DL (ref 31.4–36)
MCV RBC AUTO: 85.8 FL (ref 80–98)
MONOCYTES # BLD AUTO: 0.76 10*3/MM3 (ref 0–0.9)
MONOCYTES NFR BLD AUTO: 6.1 % (ref 0–12)
NEUTROPHILS # BLD AUTO: 8.92 10*3/MM3 (ref 2–8.6)
NEUTROPHILS NFR BLD AUTO: 71.6 % (ref 37–80)
PLATELET # BLD AUTO: 356 10*3/MM3 (ref 150–450)
PMV BLD AUTO: 9.5 FL (ref 8–12)
POTASSIUM BLD-SCNC: 3.5 MMOL/L (ref 3.5–5.1)
PROT SERPL-MCNC: 6 G/DL (ref 6.3–8.6)
RBC # BLD AUTO: 4 10*6/MM3 (ref 3.77–5.16)
SODIUM BLD-SCNC: 136 MMOL/L (ref 137–145)
TROPONIN I SERPL-MCNC: <0.012 NG/ML
TROPONIN I SERPL-MCNC: <0.012 NG/ML
WBC NRBC COR # BLD: 12.45 10*3/MM3 (ref 3.2–9.8)

## 2017-08-28 PROCEDURE — G0378 HOSPITAL OBSERVATION PER HR: HCPCS

## 2017-08-28 PROCEDURE — 63710000001 INSULIN ASPART PER 5 UNITS: Performed by: FAMILY MEDICINE

## 2017-08-28 PROCEDURE — 85025 COMPLETE CBC W/AUTO DIFF WBC: CPT | Performed by: FAMILY MEDICINE

## 2017-08-28 PROCEDURE — 63710000001 INSULIN DETEMIR PER 5 UNITS: Performed by: FAMILY MEDICINE

## 2017-08-28 PROCEDURE — 80053 COMPREHEN METABOLIC PANEL: CPT | Performed by: FAMILY MEDICINE

## 2017-08-28 PROCEDURE — 25010000002 ONDANSETRON PER 1 MG: Performed by: FAMILY MEDICINE

## 2017-08-28 PROCEDURE — 96376 TX/PRO/DX INJ SAME DRUG ADON: CPT

## 2017-08-28 PROCEDURE — 99217 PR OBSERVATION CARE DISCHARGE MANAGEMENT: CPT | Performed by: STUDENT IN AN ORGANIZED HEALTH CARE EDUCATION/TRAINING PROGRAM

## 2017-08-28 PROCEDURE — 82962 GLUCOSE BLOOD TEST: CPT

## 2017-08-28 PROCEDURE — 84484 ASSAY OF TROPONIN QUANT: CPT | Performed by: FAMILY MEDICINE

## 2017-08-28 RX ORDER — ONDANSETRON 2 MG/ML
4 INJECTION INTRAMUSCULAR; INTRAVENOUS EVERY 6 HOURS PRN
Status: DISCONTINUED | OUTPATIENT
Start: 2017-08-28 | End: 2017-08-28 | Stop reason: HOSPADM

## 2017-08-28 RX ADMIN — INSULIN ASPART 3 UNITS: 100 INJECTION, SOLUTION INTRAVENOUS; SUBCUTANEOUS at 00:29

## 2017-08-28 RX ADMIN — RANOLAZINE 1000 MG: 500 TABLET, FILM COATED, EXTENDED RELEASE ORAL at 09:07

## 2017-08-28 RX ADMIN — MAGNESIUM OXIDE TAB 400 MG (241.3 MG ELEMENTAL MG) 400 MG: 400 (241.3 MG) TAB at 00:30

## 2017-08-28 RX ADMIN — ONDANSETRON 4 MG: 2 INJECTION INTRAMUSCULAR; INTRAVENOUS at 00:51

## 2017-08-28 RX ADMIN — GABAPENTIN 600 MG: 300 CAPSULE ORAL at 06:21

## 2017-08-28 RX ADMIN — VENLAFAXINE HYDROCHLORIDE 150 MG: 75 CAPSULE, EXTENDED RELEASE ORAL at 09:08

## 2017-08-28 RX ADMIN — PANTOPRAZOLE SODIUM 40 MG: 40 TABLET, DELAYED RELEASE ORAL at 09:08

## 2017-08-28 RX ADMIN — INSULIN ASPART 5 UNITS: 100 INJECTION, SOLUTION INTRAVENOUS; SUBCUTANEOUS at 07:56

## 2017-08-28 RX ADMIN — INSULIN ASPART 5 UNITS: 100 INJECTION, SOLUTION INTRAVENOUS; SUBCUTANEOUS at 11:44

## 2017-08-28 RX ADMIN — ARIPIPRAZOLE 15 MG: 15 TABLET ORAL at 09:07

## 2017-08-28 RX ADMIN — CLOPIDOGREL BISULFATE 75 MG: 75 TABLET ORAL at 09:07

## 2017-08-28 RX ADMIN — HYDROCODONE BITARTRATE AND ACETAMINOPHEN 1 TABLET: 7.5; 325 TABLET ORAL at 06:22

## 2017-08-28 RX ADMIN — FUROSEMIDE 40 MG: 40 TABLET ORAL at 09:08

## 2017-08-28 RX ADMIN — ASPIRIN 81 MG 81 MG: 81 TABLET ORAL at 09:07

## 2017-08-28 RX ADMIN — HYDROCODONE BITARTRATE AND ACETAMINOPHEN 1 TABLET: 7.5; 325 TABLET ORAL at 00:30

## 2017-08-28 RX ADMIN — METOPROLOL SUCCINATE 25 MG: 25 TABLET, EXTENDED RELEASE ORAL at 09:08

## 2017-08-28 RX ADMIN — GABAPENTIN 600 MG: 300 CAPSULE ORAL at 00:30

## 2017-08-28 RX ADMIN — ONDANSETRON 4 MG: 2 INJECTION INTRAMUSCULAR; INTRAVENOUS at 09:18

## 2017-08-28 RX ADMIN — CHLORTHALIDONE 25 MG: 25 TABLET ORAL at 09:07

## 2017-08-28 RX ADMIN — INSULIN ASPART 30 UNITS: 100 INJECTION, SOLUTION INTRAVENOUS; SUBCUTANEOUS at 07:56

## 2017-08-28 RX ADMIN — FLUTICASONE PROPIONATE 2 SPRAY: 50 SPRAY, METERED NASAL at 09:11

## 2017-08-28 RX ADMIN — INSULIN DETEMIR 70 UNITS: 100 INJECTION, SOLUTION SUBCUTANEOUS at 00:47

## 2017-08-28 RX ADMIN — INSULIN ASPART 30 UNITS: 100 INJECTION, SOLUTION INTRAVENOUS; SUBCUTANEOUS at 11:45

## 2017-08-28 RX ADMIN — MIRTAZAPINE 45 MG: 15 TABLET, FILM COATED ORAL at 00:30

## 2017-08-30 ENCOUNTER — HOSPITAL ENCOUNTER (EMERGENCY)
Facility: HOSPITAL | Age: 57
Discharge: HOME | End: 2017-08-30
Attending: EMERGENCY MEDICINE | Admitting: EMERGENCY MEDICINE

## 2017-08-30 ENCOUNTER — APPOINTMENT (OUTPATIENT)
Dept: GENERAL RADIOLOGY | Facility: HOSPITAL | Age: 57
End: 2017-08-30

## 2017-08-30 ENCOUNTER — APPOINTMENT (OUTPATIENT)
Dept: CT IMAGING | Facility: HOSPITAL | Age: 57
End: 2017-08-30

## 2017-08-30 VITALS
WEIGHT: 240 LBS | BODY MASS INDEX: 36.37 KG/M2 | OXYGEN SATURATION: 96 % | SYSTOLIC BLOOD PRESSURE: 105 MMHG | DIASTOLIC BLOOD PRESSURE: 59 MMHG | RESPIRATION RATE: 18 BRPM | HEIGHT: 68 IN | HEART RATE: 74 BPM | TEMPERATURE: 98.3 F

## 2017-08-30 DIAGNOSIS — R53.1 WEAKNESS: ICD-10-CM

## 2017-08-30 DIAGNOSIS — N39.0 UTI (URINARY TRACT INFECTION) WITH PYURIA: Primary | ICD-10-CM

## 2017-08-30 LAB
ALBUMIN SERPL-MCNC: 4 G/DL (ref 3.4–4.8)
ALBUMIN/GLOB SERPL: 1.3 G/DL (ref 1.1–1.8)
ALP SERPL-CCNC: 104 U/L (ref 38–126)
ALT SERPL W P-5'-P-CCNC: 28 U/L (ref 9–52)
ANION GAP SERPL CALCULATED.3IONS-SCNC: 11 MMOL/L (ref 5–15)
AST SERPL-CCNC: 32 U/L (ref 14–36)
BACTERIA UR QL AUTO: ABNORMAL /HPF
BASOPHILS # BLD AUTO: 0.05 10*3/MM3 (ref 0–0.2)
BASOPHILS NFR BLD AUTO: 0.4 % (ref 0–2)
BILIRUB SERPL-MCNC: 0.7 MG/DL (ref 0.2–1.3)
BILIRUB UR QL STRIP: NEGATIVE
BUN BLD-MCNC: 22 MG/DL (ref 7–21)
BUN/CREAT SERPL: 13.8 (ref 7–25)
CALCIUM SPEC-SCNC: 9 MG/DL (ref 8.4–10.2)
CHLORIDE SERPL-SCNC: 96 MMOL/L (ref 95–110)
CLARITY UR: ABNORMAL
CO2 SERPL-SCNC: 30 MMOL/L (ref 22–31)
COLOR UR: YELLOW
CREAT BLD-MCNC: 1.59 MG/DL (ref 0.5–1)
DEPRECATED RDW RBC AUTO: 46.4 FL (ref 36.4–46.3)
EOSINOPHIL # BLD AUTO: 0.46 10*3/MM3 (ref 0–0.7)
EOSINOPHIL NFR BLD AUTO: 3.7 % (ref 0–7)
ERYTHROCYTE [DISTWIDTH] IN BLOOD BY AUTOMATED COUNT: 14.9 % (ref 11.5–14.5)
GFR SERPL CREATININE-BSD FRML MDRD: 34 ML/MIN/1.73 (ref 60–120)
GLOBULIN UR ELPH-MCNC: 3 GM/DL (ref 2.3–3.5)
GLUCOSE BLD-MCNC: 108 MG/DL (ref 60–100)
GLUCOSE UR STRIP-MCNC: NEGATIVE MG/DL
HCT VFR BLD AUTO: 37.1 % (ref 35–45)
HGB BLD-MCNC: 12 G/DL (ref 12–15.5)
HGB UR QL STRIP.AUTO: NEGATIVE
HOLD SPECIMEN: NORMAL
HOLD SPECIMEN: NORMAL
HYALINE CASTS UR QL AUTO: ABNORMAL /LPF
IMM GRANULOCYTES # BLD: 0.04 10*3/MM3 (ref 0–0.02)
IMM GRANULOCYTES NFR BLD: 0.3 % (ref 0–0.5)
KETONES UR QL STRIP: NEGATIVE
LEUKOCYTE ESTERASE UR QL STRIP.AUTO: ABNORMAL
LYMPHOCYTES # BLD AUTO: 2.5 10*3/MM3 (ref 0.6–4.2)
LYMPHOCYTES NFR BLD AUTO: 19.9 % (ref 10–50)
MAGNESIUM SERPL-MCNC: 1.9 MG/DL (ref 1.6–2.3)
MCH RBC QN AUTO: 28.2 PG (ref 26.5–34)
MCHC RBC AUTO-ENTMCNC: 32.3 G/DL (ref 31.4–36)
MCV RBC AUTO: 87.1 FL (ref 80–98)
MONOCYTES # BLD AUTO: 0.91 10*3/MM3 (ref 0–0.9)
MONOCYTES NFR BLD AUTO: 7.3 % (ref 0–12)
NEUTROPHILS # BLD AUTO: 8.58 10*3/MM3 (ref 2–8.6)
NEUTROPHILS NFR BLD AUTO: 68.4 % (ref 37–80)
NITRITE UR QL STRIP: POSITIVE
PH UR STRIP.AUTO: 5.5 [PH] (ref 5–9)
PLATELET # BLD AUTO: 375 10*3/MM3 (ref 150–450)
PMV BLD AUTO: 9.9 FL (ref 8–12)
POTASSIUM BLD-SCNC: 3.6 MMOL/L (ref 3.5–5.1)
PROT SERPL-MCNC: 7 G/DL (ref 6.3–8.6)
PROT UR QL STRIP: NEGATIVE
RBC # BLD AUTO: 4.26 10*6/MM3 (ref 3.77–5.16)
RBC # UR: ABNORMAL /HPF
REF LAB TEST METHOD: ABNORMAL
SODIUM BLD-SCNC: 137 MMOL/L (ref 137–145)
SP GR UR STRIP: 1.01 (ref 1–1.03)
SQUAMOUS #/AREA URNS HPF: ABNORMAL /HPF
T4 SERPL-MCNC: 9.7 MCG/DL (ref 5.5–11)
TSH SERPL DL<=0.05 MIU/L-ACNC: 6.04 MIU/ML (ref 0.46–4.68)
UROBILINOGEN UR QL STRIP: ABNORMAL
WBC NRBC COR # BLD: 12.54 10*3/MM3 (ref 3.2–9.8)
WBC UR QL AUTO: ABNORMAL /HPF
WHOLE BLOOD HOLD SPECIMEN: NORMAL
WHOLE BLOOD HOLD SPECIMEN: NORMAL

## 2017-08-30 PROCEDURE — 84436 ASSAY OF TOTAL THYROXINE: CPT | Performed by: EMERGENCY MEDICINE

## 2017-08-30 PROCEDURE — 83735 ASSAY OF MAGNESIUM: CPT | Performed by: EMERGENCY MEDICINE

## 2017-08-30 PROCEDURE — 87086 URINE CULTURE/COLONY COUNT: CPT | Performed by: EMERGENCY MEDICINE

## 2017-08-30 PROCEDURE — 96365 THER/PROPH/DIAG IV INF INIT: CPT

## 2017-08-30 PROCEDURE — 25010000002 CEFTRIAXONE: Performed by: EMERGENCY MEDICINE

## 2017-08-30 PROCEDURE — 99284 EMERGENCY DEPT VISIT MOD MDM: CPT

## 2017-08-30 PROCEDURE — 81001 URINALYSIS AUTO W/SCOPE: CPT | Performed by: EMERGENCY MEDICINE

## 2017-08-30 PROCEDURE — 93005 ELECTROCARDIOGRAM TRACING: CPT | Performed by: EMERGENCY MEDICINE

## 2017-08-30 PROCEDURE — 71020 HC CHEST PA AND LATERAL: CPT

## 2017-08-30 PROCEDURE — 84443 ASSAY THYROID STIM HORMONE: CPT | Performed by: EMERGENCY MEDICINE

## 2017-08-30 PROCEDURE — 87186 SC STD MICRODIL/AGAR DIL: CPT | Performed by: EMERGENCY MEDICINE

## 2017-08-30 PROCEDURE — 87077 CULTURE AEROBIC IDENTIFY: CPT | Performed by: EMERGENCY MEDICINE

## 2017-08-30 PROCEDURE — 80053 COMPREHEN METABOLIC PANEL: CPT | Performed by: EMERGENCY MEDICINE

## 2017-08-30 PROCEDURE — 70450 CT HEAD/BRAIN W/O DYE: CPT

## 2017-08-30 PROCEDURE — 93010 ELECTROCARDIOGRAM REPORT: CPT | Performed by: INTERNAL MEDICINE

## 2017-08-30 PROCEDURE — 85025 COMPLETE CBC W/AUTO DIFF WBC: CPT | Performed by: EMERGENCY MEDICINE

## 2017-08-30 RX ORDER — ACETAMINOPHEN 500 MG
1000 TABLET ORAL ONCE
Status: COMPLETED | OUTPATIENT
Start: 2017-08-30 | End: 2017-08-30

## 2017-08-30 RX ORDER — HYDROCHLOROTHIAZIDE 12.5 MG/1
12.5 TABLET ORAL DAILY
Qty: 90 TABLET | Refills: 0 | Status: SHIPPED | OUTPATIENT
Start: 2017-08-30 | End: 2017-09-28 | Stop reason: SDUPTHER

## 2017-08-30 RX ORDER — SODIUM CHLORIDE 0.9 % (FLUSH) 0.9 %
10 SYRINGE (ML) INJECTION AS NEEDED
Status: DISCONTINUED | OUTPATIENT
Start: 2017-08-30 | End: 2017-08-31 | Stop reason: HOSPADM

## 2017-08-30 RX ORDER — PHENAZOPYRIDINE HYDROCHLORIDE 100 MG/1
100 TABLET, FILM COATED ORAL 3 TIMES DAILY PRN
Qty: 6 TABLET | Refills: 0 | Status: SHIPPED | OUTPATIENT
Start: 2017-08-30 | End: 2017-09-12

## 2017-08-30 RX ORDER — CIPROFLOXACIN 500 MG/1
500 TABLET, FILM COATED ORAL 2 TIMES DAILY
Qty: 14 TABLET | Refills: 0 | Status: SHIPPED | OUTPATIENT
Start: 2017-08-30 | End: 2017-09-06

## 2017-08-30 RX ORDER — ONDANSETRON 4 MG/1
4 TABLET, ORALLY DISINTEGRATING ORAL ONCE
Status: COMPLETED | OUTPATIENT
Start: 2017-08-30 | End: 2017-08-30

## 2017-08-30 RX ADMIN — Medication 10 ML: at 15:13

## 2017-08-30 RX ADMIN — ONDANSETRON 4 MG: 4 TABLET, ORALLY DISINTEGRATING ORAL at 17:44

## 2017-08-30 RX ADMIN — ACETAMINOPHEN 1000 MG: 500 TABLET ORAL at 22:14

## 2017-08-30 RX ADMIN — CEFTRIAXONE 1 G: 1 INJECTION, POWDER, FOR SOLUTION INTRAMUSCULAR; INTRAVENOUS at 20:47

## 2017-08-31 ENCOUNTER — OFFICE VISIT (OUTPATIENT)
Dept: FAMILY MEDICINE CLINIC | Facility: CLINIC | Age: 57
End: 2017-08-31

## 2017-08-31 VITALS
OXYGEN SATURATION: 94 % | HEIGHT: 68 IN | HEART RATE: 78 BPM | WEIGHT: 260 LBS | DIASTOLIC BLOOD PRESSURE: 68 MMHG | BODY MASS INDEX: 39.4 KG/M2 | TEMPERATURE: 96.8 F | SYSTOLIC BLOOD PRESSURE: 115 MMHG

## 2017-08-31 DIAGNOSIS — R79.89 CREATININE ELEVATION: ICD-10-CM

## 2017-08-31 DIAGNOSIS — Z09 HOSPITAL DISCHARGE FOLLOW-UP: Primary | ICD-10-CM

## 2017-08-31 DIAGNOSIS — R25.1 OCCASIONAL TREMORS: ICD-10-CM

## 2017-08-31 LAB
ANION GAP SERPL CALCULATED.3IONS-SCNC: 14 MMOL/L (ref 5–15)
BUN BLD-MCNC: 21 MG/DL (ref 8–25)
BUN/CREAT SERPL: 15 (ref 7–25)
CALCIUM SPEC-SCNC: 9.5 MG/DL (ref 8.4–10.8)
CHLORIDE SERPL-SCNC: 99 MMOL/L (ref 100–112)
CO2 SERPL-SCNC: 29 MMOL/L (ref 20–32)
CREAT BLD-MCNC: 1.4 MG/DL (ref 0.4–1.3)
GFR SERPL CREATININE-BSD FRML MDRD: 39 ML/MIN/1.73 (ref 51–120)
GLUCOSE BLD-MCNC: 102 MG/DL (ref 70–100)
POTASSIUM BLD-SCNC: 3.9 MMOL/L (ref 3.4–5.4)
SODIUM BLD-SCNC: 142 MMOL/L (ref 134–146)

## 2017-08-31 PROCEDURE — 99214 OFFICE O/P EST MOD 30 MIN: CPT | Performed by: FAMILY MEDICINE

## 2017-08-31 PROCEDURE — 80048 BASIC METABOLIC PNL TOTAL CA: CPT | Performed by: FAMILY MEDICINE

## 2017-09-01 LAB — BACTERIA SPEC AEROBE CULT: ABNORMAL

## 2017-09-11 RX ORDER — PEN NEEDLE, DIABETIC 31 GX5/16"
NEEDLE, DISPOSABLE MISCELLANEOUS
Qty: 150 EACH | Refills: 11 | Status: SHIPPED | OUTPATIENT
Start: 2017-09-11 | End: 2018-06-14 | Stop reason: SDUPTHER

## 2017-09-12 ENCOUNTER — OFFICE VISIT (OUTPATIENT)
Dept: GASTROENTEROLOGY | Facility: CLINIC | Age: 57
End: 2017-09-12

## 2017-09-12 ENCOUNTER — APPOINTMENT (OUTPATIENT)
Dept: MAMMOGRAPHY | Facility: CLINIC | Age: 57
End: 2017-09-12

## 2017-09-12 ENCOUNTER — PROCEDURE VISIT (OUTPATIENT)
Dept: FAMILY MEDICINE CLINIC | Facility: CLINIC | Age: 57
End: 2017-09-12

## 2017-09-12 ENCOUNTER — LAB (OUTPATIENT)
Dept: LAB | Facility: OTHER | Age: 57
End: 2017-09-12

## 2017-09-12 VITALS
HEART RATE: 69 BPM | WEIGHT: 265 LBS | OXYGEN SATURATION: 96 % | DIASTOLIC BLOOD PRESSURE: 68 MMHG | BODY MASS INDEX: 40.16 KG/M2 | HEIGHT: 68 IN | TEMPERATURE: 97.3 F | SYSTOLIC BLOOD PRESSURE: 118 MMHG

## 2017-09-12 VITALS
WEIGHT: 268.2 LBS | SYSTOLIC BLOOD PRESSURE: 111 MMHG | HEART RATE: 76 BPM | HEIGHT: 68 IN | DIASTOLIC BLOOD PRESSURE: 73 MMHG | BODY MASS INDEX: 40.65 KG/M2

## 2017-09-12 DIAGNOSIS — R29.898 WEAKNESS OF BOTH LOWER EXTREMITIES: ICD-10-CM

## 2017-09-12 DIAGNOSIS — G89.4 CHRONIC PAIN DISORDER: ICD-10-CM

## 2017-09-12 DIAGNOSIS — R11.2 NAUSEA AND VOMITING, INTRACTABILITY OF VOMITING NOT SPECIFIED, UNSPECIFIED VOMITING TYPE: ICD-10-CM

## 2017-09-12 DIAGNOSIS — R19.4 CHANGE IN BOWEL HABITS: Primary | ICD-10-CM

## 2017-09-12 DIAGNOSIS — Z01.419 WELL FEMALE EXAM WITH ROUTINE GYNECOLOGICAL EXAM: Primary | ICD-10-CM

## 2017-09-12 DIAGNOSIS — R79.89 ELEVATED SERUM CREATININE: ICD-10-CM

## 2017-09-12 DIAGNOSIS — R10.84 GENERALIZED ABDOMINAL PAIN: ICD-10-CM

## 2017-09-12 DIAGNOSIS — Z12.72 SCREENING FOR MALIGNANT NEOPLASM OF VAGINA AFTER TOTAL HYSTERECTOMY: ICD-10-CM

## 2017-09-12 DIAGNOSIS — R30.0 DYSURIA: ICD-10-CM

## 2017-09-12 DIAGNOSIS — Z90.710 SCREENING FOR MALIGNANT NEOPLASM OF VAGINA AFTER TOTAL HYSTERECTOMY: ICD-10-CM

## 2017-09-12 LAB
ANION GAP SERPL CALCULATED.3IONS-SCNC: 14 MMOL/L (ref 5–15)
BASOPHILS # BLD AUTO: 0.05 10*3/MM3 (ref 0–0.2)
BASOPHILS NFR BLD AUTO: 0.4 % (ref 0–2)
BILIRUB BLD-MCNC: ABNORMAL MG/DL
BUN BLD-MCNC: 30 MG/DL (ref 8–25)
BUN/CREAT SERPL: 23.1 (ref 7–25)
CALCIUM SPEC-SCNC: 9.4 MG/DL (ref 8.4–10.8)
CHLORIDE SERPL-SCNC: 97 MMOL/L (ref 100–112)
CLARITY, POC: CLEAR
CO2 SERPL-SCNC: 29 MMOL/L (ref 20–32)
COLOR UR: YELLOW
CREAT BLD-MCNC: 1.3 MG/DL (ref 0.4–1.3)
DEPRECATED RDW RBC AUTO: 48.5 FL (ref 36.4–46.3)
EOSINOPHIL # BLD AUTO: 0.39 10*3/MM3 (ref 0–0.7)
EOSINOPHIL NFR BLD AUTO: 3.2 % (ref 0–7)
ERYTHROCYTE [DISTWIDTH] IN BLOOD BY AUTOMATED COUNT: 15.2 % (ref 11.5–14.5)
GFR SERPL CREATININE-BSD FRML MDRD: 43 ML/MIN/1.73 (ref 51–120)
GLUCOSE BLD-MCNC: 65 MG/DL (ref 70–100)
GLUCOSE UR STRIP-MCNC: NEGATIVE MG/DL
HCT VFR BLD AUTO: 37.4 % (ref 35–45)
HGB BLD-MCNC: 12.2 G/DL (ref 12–15.5)
KETONES UR QL: NEGATIVE
LEUKOCYTE EST, POC: ABNORMAL
LYMPHOCYTES # BLD AUTO: 2.76 10*3/MM3 (ref 0.6–4.2)
LYMPHOCYTES NFR BLD AUTO: 22.8 % (ref 10–50)
MCH RBC QN AUTO: 29.1 PG (ref 26.5–34)
MCHC RBC AUTO-ENTMCNC: 32.6 G/DL (ref 31.4–36)
MCV RBC AUTO: 89.3 FL (ref 80–98)
MONOCYTES # BLD AUTO: 1.06 10*3/MM3 (ref 0–0.9)
MONOCYTES NFR BLD AUTO: 8.8 % (ref 0–12)
NEUTROPHILS # BLD AUTO: 7.83 10*3/MM3 (ref 2–8.6)
NEUTROPHILS NFR BLD AUTO: 64.8 % (ref 37–80)
NITRITE UR-MCNC: NEGATIVE MG/ML
PH UR: 5 [PH] (ref 5–8)
PLATELET # BLD AUTO: 472 10*3/MM3 (ref 150–450)
PMV BLD AUTO: 9.6 FL (ref 8–12)
POTASSIUM BLD-SCNC: 3.5 MMOL/L (ref 3.4–5.4)
PROT UR STRIP-MCNC: ABNORMAL MG/DL
RBC # BLD AUTO: 4.19 10*6/MM3 (ref 3.77–5.16)
RBC # UR STRIP: NEGATIVE /UL
SODIUM BLD-SCNC: 140 MMOL/L (ref 134–146)
SP GR UR: 1.01 (ref 1–1.03)
UROBILINOGEN UR QL: NORMAL
WBC NRBC COR # BLD: 12.09 10*3/MM3 (ref 3.2–9.8)

## 2017-09-12 PROCEDURE — 77063 BREAST TOMOSYNTHESIS BI: CPT | Performed by: INTERNAL MEDICINE

## 2017-09-12 PROCEDURE — 85025 COMPLETE CBC W/AUTO DIFF WBC: CPT | Performed by: FAMILY MEDICINE

## 2017-09-12 PROCEDURE — 81002 URINALYSIS NONAUTO W/O SCOPE: CPT | Performed by: FAMILY MEDICINE

## 2017-09-12 PROCEDURE — 36415 COLL VENOUS BLD VENIPUNCTURE: CPT | Performed by: FAMILY MEDICINE

## 2017-09-12 PROCEDURE — 82607 VITAMIN B-12: CPT | Performed by: FAMILY MEDICINE

## 2017-09-12 PROCEDURE — 82746 ASSAY OF FOLIC ACID SERUM: CPT | Performed by: FAMILY MEDICINE

## 2017-09-12 PROCEDURE — 99214 OFFICE O/P EST MOD 30 MIN: CPT | Performed by: NURSE PRACTITIONER

## 2017-09-12 PROCEDURE — 88142 CYTOPATH C/V THIN LAYER: CPT | Performed by: PATHOLOGY

## 2017-09-12 PROCEDURE — 77067 SCR MAMMO BI INCL CAD: CPT | Performed by: INTERNAL MEDICINE

## 2017-09-12 PROCEDURE — 80048 BASIC METABOLIC PNL TOTAL CA: CPT | Performed by: FAMILY MEDICINE

## 2017-09-12 PROCEDURE — 99396 PREV VISIT EST AGE 40-64: CPT | Performed by: FAMILY MEDICINE

## 2017-09-12 RX ORDER — HYDROCODONE BITARTRATE AND ACETAMINOPHEN 7.5; 325 MG/1; MG/1
1 TABLET ORAL EVERY 4 HOURS PRN
Qty: 180 TABLET | Refills: 0 | Status: SHIPPED | OUTPATIENT
Start: 2017-09-12 | End: 2017-10-09 | Stop reason: SDUPTHER

## 2017-09-12 RX ORDER — SODIUM, POTASSIUM,MAG SULFATES 17.5-3.13G
1 SOLUTION, RECONSTITUTED, ORAL ORAL EVERY 12 HOURS
Qty: 2 BOTTLE | Refills: 0 | Status: SHIPPED | OUTPATIENT
Start: 2017-09-12 | End: 2017-10-14

## 2017-09-12 RX ORDER — DEXTROSE AND SODIUM CHLORIDE 5; .45 G/100ML; G/100ML
30 INJECTION, SOLUTION INTRAVENOUS CONTINUOUS PRN
Status: CANCELLED | OUTPATIENT
Start: 2017-10-16

## 2017-09-12 NOTE — PROGRESS NOTES
Chief Complaint   Patient presents with   • Diarrhea       Subjective    Ariana Martinez is a 57 y.o. female. she is being seen for consultation today at the request of Elvis Gamboa, *    History of Present Illness  57-year-old female presents to discuss changes in bowel habits.  States she has gastroparesis however has recently had problems with severe diarrhea.  Reports that occurred last month and over the last few weeks symptoms have improved a bowel movement about 4-5 times per day.  Denies any melena or hematochezia.  Previously gastroparesis was controlled with Reglan twice per day however no prescription of this is on file on her medical records.  States she's having trouble with her legs and arms shaking involuntarily and she is following up with neurologist October 5.  Reports nausea and vomiting occur intermittently.  Patient has had previous colonoscopy in 2013 by Dr. Santamaria.  Poor prep was noted.  States she is unsure as when repeat colonoscopy is recommended.  Plan; we'll schedule patient for EGD and colonoscopy to evaluate for source of changes in bowel habits.       The following portions of the patient's history were reviewed and updated as appropriate:   Past Medical History:   Diagnosis Date   • Acquired hypothyroidism    • Angina, class IV    • Anxiety    • Benign paroxysmal positional vertigo    • Carpal tunnel syndrome    • Chronic pain    • Coronary atherosclerosis    • Depression    • Diabetes mellitus     Type 2, controlled   • Diabetic polyneuropathy    • Female stress incontinence    • Hashimoto's thyroiditis    • Hyperlipidemia    • Hypertension    • Low back pain    • Malaise and fatigue    • Multiple joint pain    • Obesity     Refuses to be weighed   • Otalgia     Both   • Perforation of tympanic membrane     Left   • Postoperative wound infection    • Vitamin D deficiency      Past Surgical History:   Procedure Laterality Date   • ABDOMINAL SURGERY     • ANGIOPLASTY       coronary   • BREAST BIOPSY Right    • CARDIAC CATHETERIZATION     • CARDIAC CATHETERIZATION N/A 2017    Procedure: Right Heart Cath;  Surgeon: Can Kwon MD PhD;  Location: [facility] INVASIVE LOCATION;  Service:    • CARPAL TUNNEL RELEASE     • CHOLECYSTECTOMY     • CORONARY ARTERY BYPASS GRAFT     • ENDOSCOPY AND COLONOSCOPY     • FOOT SURGERY      Toes   • GASTRIC BANDING      Revision, laparoscopic   • HYSTERECTOMY     • MOUTH SURGERY     • SALPINGO OOPHORECTOMY     • SHOULDER SURGERY     • TRANSESOPHAGEAL ECHOCARDIOGRAM (LAMONTE)      With color flow     Family History   Problem Relation Age of Onset   • Diabetes Other    • Heart disease Other    • Hypertension Other    • Heart disease Mother    • Stroke Mother    • Hypertension Mother    • Diabetes Sister    • Heart disease Sister    • Hypertension Sister    • Heart disease Sister    • Diabetes Sister    • Hypertension Sister    • Diabetes Sister    • Diabetes Sister    • Diabetes Sister    • Diabetes Sister      OB History      Para Term  AB TAB SAB Ectopic Multiple Living    0 0 0 0 0 0 0 0 0 0        Current Outpatient Prescriptions   Medication Sig Dispense Refill   • ARIPiprazole (ABILIFY) 15 MG tablet TAKE 1 TABLET BY MOUTH DAILY. 30 tablet 5   • aspirin 81 MG chewable tablet Chew 81 mg daily.     • B-D ULTRAFINE III SHORT PEN 31G X 8 MM misc USE AS DIRECTED 4 TIMES DAILY 150 each 11   • Calcium Citrate-Vitamin D (CITRACAL/VITAMIN D) 250-200 MG-UNIT tablet Take 2 tablets by mouth 2 (two) times a day.     • chlorthalidone (HYGROTON) 25 MG tablet Take 1 tablet by mouth Daily. 31 tablet 3   • clopidogrel (PLAVIX) 75 MG tablet Take 1 tablet by mouth Daily. 90 tablet 3   • fluticasone (FLONASE) 50 MCG/ACT nasal spray ADMINISTER 2 SPRAYS INTO EACH NOSTRIL DAILY FOR 30 DAYS. 16 mL 0   • furosemide (LASIX) 40 MG tablet Take 1 tablet by mouth Daily. 90 tablet 3   • gabapentin (NEURONTIN) 600 MG tablet Take 1 tablet by mouth 3 (Three)  Times a Day. 90 tablet 5   • GLUCAGON EMERGENCY 1 MG injection USE AS DIRECTED AS NEEDED 1 kit 0   • hydrochlorothiazide (HYDRODIURIL) 12.5 MG tablet Take 1 tablet by mouth Daily. 90 tablet 0   • HYDROcodone-acetaminophen (NORCO) 7.5-325 MG per tablet Take 1 tablet by mouth Every 4 (Four) Hours As Needed for Moderate Pain . 180 tablet 0   • insulin aspart (novoLOG FLEXPEN) 100 UNIT/ML solution pen-injector sc pen Inject 40 units with meals 30 mL 5   • Insulin Degludec 200 UNIT/ML solution pen-injector Inject 100 Units under the skin Daily. 6 pen 11   • LORazepam (ATIVAN) 1 MG tablet Take 1 tablet by mouth 2 (Two) Times a Day As Needed for Anxiety. 60 tablet 2   • MAGOX 400 400 (241.3 MG) MG tablet tablet TAKE 1 TABLET BY MOUTH TWICE A DAY  12   • metoprolol succinate XL (TOPROL-XL) 25 MG 24 hr tablet Take 1 tablet by mouth Daily. 31 tablet 13   • mirtazapine (REMERON) 45 MG tablet TAKE 1 TABLET BY MOUTH EVERY DAY AT BEDTIME 30 tablet 5   • nabumetone (RELAFEN) 500 MG tablet TAKE 1 TABLET BY MOUTH 2 (TWO) TIMES A DAY AS NEEDED FOR MILD PAIN (1-3). 60 tablet 0   • ondansetron (ZOFRAN) 8 MG tablet Take 1 tablet by mouth every 8 (eight) hours. 90 tablet 3   • pantoprazole (PROTONIX) 40 MG EC tablet Take 1 tablet by mouth daily. 90 tablet 3   • ranolazine (RANEXA) 500 MG 12 hr tablet Take 2 tablets by mouth 2 (Two) Times a Day. 120 tablet 6   • venlafaxine XR (EFFEXOR-XR) 150 MG 24 hr capsule TAKE ONE CAPSULE BY MOUTH TWICE A DAY FOR DEPRESSION 180 capsule 3   • vitamin D (ERGOCALCIFEROL) 40532 UNITS capsule capsule TAKE ONE CAPSULE BY MOUTH EVERY SUNDAY 12 capsule 2   • atorvastatin (LIPITOR) 40 MG tablet Take 40 mg by mouth Every Night.     • sodium-potassium-magnesium sulfates (SUPREP BOWEL PREP KIT) 17.5-3.13-1.6 GM/180ML solution oral solution Take 1 bottle by mouth Every 12 (Twelve) Hours. 2 bottle 0     No current facility-administered medications for this visit.      Allergies   Allergen Reactions   • Seroquel  "[Quetiapine Fumarate] Anaphylaxis   • Avandia [Rosiglitazone] Swelling   • Morphine And Related Hallucinations     If patient takes to much     Social History     Social History   • Marital status:      Spouse name: N/A   • Number of children: N/A   • Years of education: N/A     Social History Main Topics   • Smoking status: Never Smoker   • Smokeless tobacco: Never Used   • Alcohol use No   • Drug use: No   • Sexual activity: Defer     Other Topics Concern   • None     Social History Narrative       Review of Systems  Review of Systems   Constitutional: Positive for fatigue. Negative for activity change, appetite change, chills, diaphoresis, fever and unexpected weight change.   HENT: Negative for sore throat and trouble swallowing.    Respiratory: Negative for shortness of breath.    Gastrointestinal: Positive for abdominal pain. Negative for abdominal distention, anal bleeding, blood in stool, constipation, diarrhea, nausea, rectal pain and vomiting.   Musculoskeletal: Negative for arthralgias.   Skin: Negative for pallor.   Neurological: Positive for weakness. Negative for light-headedness.        /73 (BP Location: Right arm, Patient Position: Sitting, Cuff Size: Adult)  Pulse 76  Ht 68\" (172.7 cm)  Wt 268 lb 3.2 oz (122 kg)  BMI 40.78 kg/m2    Objective    Physical Exam   Constitutional: She is oriented to person, place, and time. She appears well-developed and well-nourished. She is cooperative. No distress.   HENT:   Head: Normocephalic and atraumatic.   Neck: Normal range of motion. Neck supple. No thyromegaly present.   Cardiovascular: Normal rate, regular rhythm and normal heart sounds.    Pulmonary/Chest: Effort normal and breath sounds normal. She has no wheezes. She has no rhonchi. She has no rales.   Abdominal: Soft. Normal appearance and bowel sounds are normal. She exhibits no shifting dullness and no distension. There is no hepatosplenomegaly. There is no tenderness. There is no " rigidity and no guarding. No hernia.   Lymphadenopathy:     She has no cervical adenopathy.   Neurological: She is alert and oriented to person, place, and time.   Skin: Skin is warm, dry and intact. No rash noted. No pallor.   Psychiatric: She has a normal mood and affect. Her speech is normal.     Lab on 09/12/2017   Component Date Value Ref Range Status   • Glucose 09/12/2017 65* 70 - 100 mg/dL Final   • BUN 09/12/2017 30* 8 - 25 mg/dL Final   • Creatinine 09/12/2017 1.30  0.40 - 1.30 mg/dL Final   • Sodium 09/12/2017 140  134 - 146 mmol/L Final   • Potassium 09/12/2017 3.5  3.4 - 5.4 mmol/L Final   • Chloride 09/12/2017 97* 100 - 112 mmol/L Final   • CO2 09/12/2017 29.0  20.0 - 32.0 mmol/L Final   • Calcium 09/12/2017 9.4  8.4 - 10.8 mg/dL Final   • eGFR Non African Amer 09/12/2017 43* 51 - 120 mL/min/1.73 Final   • BUN/Creatinine Ratio 09/12/2017 23.1  7.0 - 25.0 Final   • Anion Gap 09/12/2017 14.0  5.0 - 15.0 mmol/L Final     Assessment/Plan      1. Change in bowel habits    2. Generalized abdominal pain    3. Nausea and vomiting, intractability of vomiting not specified, unspecified vomiting type    .       Orders placed during this encounter include:    ESOPHAGOGASTRODUODENOSCOPY (N/A), COLONOSCOPY (N/A)    Review and/or summary of lab tests, radiology, procedures, medications. Review and summary of old records and obtaining of history. The risks and benefits of my recommendations, as well as other treatment options were discussed with the patient today. Questions were answered.    New Medications Ordered This Visit   Medications   • sodium-potassium-magnesium sulfates (SUPREP BOWEL PREP KIT) 17.5-3.13-1.6 GM/180ML solution oral solution     Sig: Take 1 bottle by mouth Every 12 (Twelve) Hours.     Dispense:  2 bottle     Refill:  0       Follow-up: No Follow-up on file.          This document has been electronically signed by BEBE Leach on September 12, 2017 2:57 PM             Results for orders  placed or performed in visit on 09/12/17   Basic metabolic panel   Result Value Ref Range    Glucose 65 (L) 70 - 100 mg/dL    BUN 30 (H) 8 - 25 mg/dL    Creatinine 1.30 0.40 - 1.30 mg/dL    Sodium 140 134 - 146 mmol/L    Potassium 3.5 3.4 - 5.4 mmol/L    Chloride 97 (L) 100 - 112 mmol/L    CO2 29.0 20.0 - 32.0 mmol/L    Calcium 9.4 8.4 - 10.8 mg/dL    eGFR Non  Amer 43 (L) 51 - 120 mL/min/1.73    BUN/Creatinine Ratio 23.1 7.0 - 25.0    Anion Gap 14.0 5.0 - 15.0 mmol/L   Results for orders placed or performed in visit on 09/12/17   CBC Auto Differential   Result Value Ref Range    WBC 12.09 (H) 3.20 - 9.80 10*3/mm3    RBC 4.19 3.77 - 5.16 10*6/mm3    Hemoglobin 12.2 12.0 - 15.5 g/dL    Hematocrit 37.4 35.0 - 45.0 %    MCV 89.3 80.0 - 98.0 fL    MCH 29.1 26.5 - 34.0 pg    MCHC 32.6 31.4 - 36.0 g/dL    RDW 15.2 (H) 11.5 - 14.5 %    RDW-SD 48.5 (H) 36.4 - 46.3 fl    MPV 9.6 8.0 - 12.0 fL    Platelets 472 (H) 150 - 450 10*3/mm3    Neutrophil % 64.8 37.0 - 80.0 %    Lymphocyte % 22.8 10.0 - 50.0 %    Monocyte % 8.8 0.0 - 12.0 %    Eosinophil % 3.2 0.0 - 7.0 %    Basophil % 0.4 0.0 - 2.0 %    Neutrophils, Absolute 7.83 2.00 - 8.60 10*3/mm3    Lymphocytes, Absolute 2.76 0.60 - 4.20 10*3/mm3    Monocytes, Absolute 1.06 (H) 0.00 - 0.90 10*3/mm3    Eosinophils, Absolute 0.39 0.00 - 0.70 10*3/mm3    Basophils, Absolute 0.05 0.00 - 0.20 10*3/mm3   POC Urinalysis Dipstick   Result Value Ref Range    Color Yellow Yellow, Straw, Dark Yellow, Kristel    Clarity, UA Clear Clear    Glucose, UA Negative Negative, 1000 mg/dL (3+) mg/dL    Bilirubin Small (1+) (A) Negative    Ketones, UA Negative Negative    Specific Gravity  1.015 1.005 - 1.030    Blood, UA Negative Negative    pH, Urine 5.0 5.0 - 8.0    Protein, POC Trace (A) Negative mg/dL    Urobilinogen, UA Normal Normal    Leukocytes Trace (A) Negative    Nitrite, UA Negative Negative   Results for orders placed or performed in visit on 08/31/17   Basic Metabolic Panel    Result Value Ref Range    Glucose 102 (H) 70 - 100 mg/dL    BUN 21 8 - 25 mg/dL    Creatinine 1.40 (H) 0.40 - 1.30 mg/dL    Sodium 142 134 - 146 mmol/L    Potassium 3.9 3.4 - 5.4 mmol/L    Chloride 99 (L) 100 - 112 mmol/L    CO2 29.0 20.0 - 32.0 mmol/L    Calcium 9.5 8.4 - 10.8 mg/dL    eGFR Non  Amer 39 (L) 51 - 120 mL/min/1.73    BUN/Creatinine Ratio 15.0 7.0 - 25.0    Anion Gap 14.0 5.0 - 15.0 mmol/L   Results for orders placed or performed during the hospital encounter of 08/30/17   Gold Top - SST   Result Value Ref Range    Extra Tube Hold for add-ons.    Green Top (Gel)   Result Value Ref Range    Extra Tube Hold for add-ons.    Urinalysis, Microscopic Only   Result Value Ref Range    RBC, UA 3-5 (A) None Seen /HPF    WBC, UA Too Numerous to Count (A) None Seen, 0-2, 3-5 /HPF    Bacteria, UA 4+ (A) None Seen /HPF    Squamous Epithelial Cells, UA 3-5 (A) None Seen, 0-2 /HPF    Hyaline Casts, UA 7-12 None Seen /LPF    Methodology Automated Microscopy    Urinalysis With / Culture If Indicated   Result Value Ref Range    Color, UA Yellow Yellow, Straw, Dark Yellow, Kristel    Appearance, UA Cloudy (A) Clear    pH, UA 5.5 5.0 - 9.0    Specific [place], UA 1.011 1.003 - 1.030    Glucose, UA Negative Negative    Ketones, UA Negative Negative    Bilirubin, UA Negative Negative    Blood, UA Negative Negative    Protein, UA Negative Negative    Leuk Esterase, UA Large (3+) (A) Negative    Nitrite, UA Positive (A) Negative    Urobilinogen, UA 1.0 E.U./dL 0.2 - 1.0 E.U./dL   CBC Auto Differential   Result Value Ref Range    WBC 12.54 (H) 3.20 - 9.80 10*3/mm3    RBC 4.26 3.77 - 5.16 10*6/mm3    Hemoglobin 12.0 12.0 - 15.5 g/dL    Hematocrit 37.1 35.0 - 45.0 %    MCV 87.1 80.0 - 98.0 fL    MCH 28.2 26.5 - 34.0 pg    MCHC 32.3 31.4 - 36.0 g/dL    RDW 14.9 (H) 11.5 - 14.5 %    RDW-SD 46.4 (H) 36.4 - 46.3 fl    MPV 9.9 8.0 - 12.0 fL    Platelets 375 150 - 450 10*3/mm3    Neutrophil % 68.4 37.0 - 80.0 %    Lymphocyte %  19.9 10.0 - 50.0 %    Monocyte % 7.3 0.0 - 12.0 %    Eosinophil % 3.7 0.0 - 7.0 %    Basophil % 0.4 0.0 - 2.0 %    Immature Grans % 0.3 0.0 - 0.5 %    Neutrophils, Absolute 8.58 2.00 - 8.60 10*3/mm3    Lymphocytes, Absolute 2.50 0.60 - 4.20 10*3/mm3    Monocytes, Absolute 0.91 (H) 0.00 - 0.90 10*3/mm3    Eosinophils, Absolute 0.46 0.00 - 0.70 10*3/mm3    Basophils, Absolute 0.05 0.00 - 0.20 10*3/mm3    Immature Grans, Absolute 0.04 (H) 0.00 - 0.02 10*3/mm3   Lavender Top   Result Value Ref Range    Extra Tube hold for add-on    Light Blue Top   Result Value Ref Range    Extra Tube hold for add-on      *Note: Due to a large number of results and/or encounters for the requested time period, some results have not been displayed. A complete set of results can be found in Results Review.

## 2017-09-12 NOTE — PROGRESS NOTES
Subjective   Chief Complaint   Patient presents with   • Gynecologic Exam     pap smear   • Med Refill      also needs refill     Ariana Martinez is a 57 y.o. year old  presenting to be seen for her annual exam, also complaining of dysuria.  Recently treated for UTI with ciprofloxacin.    She is sexually active.  In the past 12 months there has not been new sexual partners.  Condoms are not typically used.  She would not like to be screened for STD's at today's exam.     She exercises regularly: no.  She wears her seat belt:yes.  She has concerns about domestic violence: no.  She is taking Vit D and Calcium:yes  Last colonoscopy:   Last DEXA:   Last MM  Last PAP:   Hx of abnormal pap: unknown    Yes  SURGICAL MENOPAUSE  LMP: unknown    OB History      Para Term  AB TAB SAB Ectopic Multiple Living    0 0 0 0 0 0 0 0 0 0          No Additional Complaints Reported    The following portions of the patient's history were reviewed and updated as appropriate:problem list, current medications, allergies, past family history, past medical history, past social history and past surgical history.    Smoking status: Never Smoker                                                              Smokeless status: Never Used                        Review of Systems   Constitutional: Negative for appetite change, chills, fatigue and fever.   HENT: Negative for congestion, ear pain, rhinorrhea and sore throat.    Eyes: Negative for pain.   Respiratory: Negative for cough and shortness of breath.    Cardiovascular: Negative for chest pain and palpitations.   Gastrointestinal: Negative for abdominal pain, constipation, diarrhea and nausea.   Genitourinary: Positive for dysuria.   Musculoskeletal: Positive for back pain. Negative for joint swelling and neck pain.   Skin: Negative for rash.   Neurological: Positive for tremors and weakness. Negative for dizziness and headaches.         Objective  "  /68  Pulse 69  Temp 97.3 °F (36.3 °C)  Ht 68\" (172.7 cm)  Wt 265 lb (120 kg)  SpO2 96%  BMI 40.29 kg/m2    General:  well developed; well nourished  no acute distress  obese - Body mass index is 40.29 kg/(m^2).   Skin:  No suspicious lesions seen   Cardiac: Heart sounds are normal.  Regular rate and rhythm without murmur, gallop or rub.  Heart regular rate and rhythm   Resp:  Normal expansion.  Clear to auscultation.  No rales, rhonchi, or wheezing., No chest wall tenderness.   Thyroid: normal to inspection and palpation   Breasts:  Examined in supine position  Symmetric without masses or skin dimpling  Nipples normal without inversion, lesions or discharge  There are no palpable axillary nodes   Abdomen: soft, non-tender; no masses  no umbilical or inginual hernias are present  no hepato-splenomegaly  central obesity   Psych: alert,oriented, in NAD with a full range of affect, normal behavior and no psychotic features   Pelvis: Clinical staff was present for exam  External genitalia:  normal appearance of the external genitalia including Bartholin's and Sanford's glands.  :  urethral meatus normal;  Vaginal:  normal pink mucosa without prolapse or lesions.  Cervix:  absent.  Uterus:  absent.  Adnexa:  absent, bilateral.  Rectal:  digital rectal exam not performed; anus visually normal appearing.  Pelvic floor pain: her symptoms ARE reproduced with palpation on the left pelvic sidewall;     Lab Review   BMP    Imaging  No data reviewed, mammogram scheduled today       Assessment   1. Annual female examination  2. Lower extremity weakness, tremors  3. Screening for breast cancer  4. Chronic low back pain  5. dysuria       Plan   1. Cytology collected  2. Labs ordered  3. Xray ordered, consider CT, consider PT  4. Scheduled for neurology - needs an EMG  5. Refilled norco  6. Urinalysis in office - negative    New Medications Ordered This Visit   Medications   • HYDROcodone-acetaminophen (NORCO) 7.5-325 " MG per tablet     Sig: Take 1 tablet by mouth Every 4 (Four) Hours As Needed for Moderate Pain .     Dispense:  180 tablet     Refill:  0          This note was electronically signed.    Francisca Fong MD  September 12, 2017

## 2017-09-13 LAB
FOLATE SERPL-MCNC: 9.1 NG/ML (ref 2.76–21)
VIT B12 BLD-MCNC: 257 PG/ML (ref 239–931)

## 2017-09-13 RX ORDER — CLOPIDOGREL BISULFATE 75 MG/1
TABLET ORAL
Qty: 90 TABLET | Refills: 3 | Status: SHIPPED | OUTPATIENT
Start: 2017-09-13 | End: 2018-08-25 | Stop reason: SDUPTHER

## 2017-09-15 ENCOUNTER — OFFICE VISIT (OUTPATIENT)
Dept: CARDIOLOGY | Facility: CLINIC | Age: 57
End: 2017-09-15

## 2017-09-15 ENCOUNTER — TELEPHONE (OUTPATIENT)
Dept: FAMILY MEDICINE CLINIC | Facility: CLINIC | Age: 57
End: 2017-09-15

## 2017-09-15 VITALS
BODY MASS INDEX: 40.39 KG/M2 | WEIGHT: 266.5 LBS | HEART RATE: 79 BPM | DIASTOLIC BLOOD PRESSURE: 71 MMHG | HEIGHT: 68 IN | OXYGEN SATURATION: 98 % | SYSTOLIC BLOOD PRESSURE: 117 MMHG

## 2017-09-15 DIAGNOSIS — I50.30 HEART FAILURE WITH PRESERVED EJECTION FRACTION, BORDERLINE, CLASS II (HCC): Primary | ICD-10-CM

## 2017-09-15 DIAGNOSIS — I73.9 CLAUDICATION (HCC): ICD-10-CM

## 2017-09-15 DIAGNOSIS — E53.8 LOW VITAMIN B12 LEVEL: Primary | ICD-10-CM

## 2017-09-15 DIAGNOSIS — I25.10 CORONARY ARTERY DISEASE INVOLVING NATIVE CORONARY ARTERY OF NATIVE HEART WITHOUT ANGINA PECTORIS: ICD-10-CM

## 2017-09-15 DIAGNOSIS — E53.8 LOW VITAMIN B12 LEVEL: ICD-10-CM

## 2017-09-15 DIAGNOSIS — E78.2 MIXED HYPERLIPIDEMIA: ICD-10-CM

## 2017-09-15 PROCEDURE — 99214 OFFICE O/P EST MOD 30 MIN: CPT | Performed by: INTERNAL MEDICINE

## 2017-09-15 RX ORDER — CYANOCOBALAMIN 1000 UG/ML
1000 INJECTION, SOLUTION INTRAMUSCULAR; SUBCUTANEOUS
Status: DISCONTINUED | OUTPATIENT
Start: 2017-09-15 | End: 2017-09-15

## 2017-09-15 RX ORDER — CYANOCOBALAMIN 1000 UG/ML
1000 INJECTION, SOLUTION INTRAMUSCULAR; SUBCUTANEOUS
Status: DISCONTINUED | OUTPATIENT
Start: 2017-10-13 | End: 2019-01-11

## 2017-09-15 RX ORDER — CYANOCOBALAMIN 1000 UG/ML
1000 INJECTION, SOLUTION INTRAMUSCULAR; SUBCUTANEOUS
Status: DISCONTINUED | OUTPATIENT
Start: 2017-09-15 | End: 2017-10-14 | Stop reason: SDUPTHER

## 2017-09-15 NOTE — PROGRESS NOTES
"Cardiovascular Medicine   Can Kwon M.D., Ph.D., [facility]    This is a 57 y.o. female with:  1. MV ASCAD  A. CABG, 2005  -LIMA-LAD: Prior PCI 2.5 x 28mm in 2004  -RCA, 60%  B. LHC, 6/2016  -pLAD: 50%  -Ostial D1-30%  -mLAD: 100%  -dLAD fills via LIMA  -OM1: 60%  -FFR was 0.93  2. HTN  3. HLD  4. DM2  5. Mild MR/TR      HFpEF  Patient was continuing to have exertional dyspnea.  She was having class II symptoms.  I was concerned this represented HFpEF.  I referred her for RHC.  This did show elevated left sided filling pressures consistent with HFpEF.  Her current regimen includes lasix, HCTZ and Toprol-XL. Today, she tells me that her legs feel odd. They just \"melt.\" No overt pain.     ASCAD  The pt was diagnosed with CAD in 2004 and underwent PCI to LAD. She had subsequent restenosis and went for a LIMA to LAD in 2005. She  Had recurrent pain in summer of 2016. Repeat LHC revealed patent LIMA and a 60% OM1 lesion with a FFR of 0.93. She has been maintained on OMT. She is currently on ASA, Plavix, Toprol and Lipitor.      HLD  Concerning the patient's hyperlipidemia, the patient remains on a statin.  They are tolerating this very well.  Denies side effects.  Specifically, the patient denies any prior history of asymptomatic LFT elevation, myositis or myalgias.  Patient's laboratory evaluations are followed closely by their PCP.    Mild MR  Her most recent echocardiogram in April of this year showed mild MR.      Review of Systems - History obtained from chart review and the patient  General ROS: negative  Respiratory ROS: positive for - chest pain    family history includes Diabetes in her other; Heart disease in her other; Hypertension in her other.     reports that she has never smoked. She has never used smokeless tobacco. She reports that she does not drink alcohol or use illicit drugs.    Allergies   Allergen Reactions   • Seroquel [Quetiapine Fumarate] Anaphylaxis   • Avandia [Rosiglitazone] Swelling   • " Morphine And Related Hallucinations     If patient takes to much       Current Outpatient Prescriptions:   •  ARIPiprazole (ABILIFY) 15 MG tablet, TAKE 1 TABLET BY MOUTH DAILY., Disp: 30 tablet, Rfl: 5  •  aspirin 81 MG chewable tablet, Chew 81 mg daily., Disp: , Rfl:   •  atorvastatin (LIPITOR) 40 MG tablet, Take 40 mg by mouth Every Night., Disp: , Rfl:   •  B-D ULTRAFINE III SHORT PEN 31G X 8 MM misc, USE AS DIRECTED 4 TIMES DAILY, Disp: 150 each, Rfl: 11  •  Calcium Citrate-Vitamin D (CITRACAL/VITAMIN D) 250-200 MG-UNIT tablet, Take 2 tablets by mouth 2 (two) times a day., Disp: , Rfl:   •  chlorthalidone (HYGROTON) 25 MG tablet, Take 1 tablet by mouth Daily., Disp: 31 tablet, Rfl: 3  •  clopidogrel (PLAVIX) 75 MG tablet, TAKE 1 TABLET BY MOUTH DAILY., Disp: 90 tablet, Rfl: 3  •  fluticasone (FLONASE) 50 MCG/ACT nasal spray, ADMINISTER 2 SPRAYS INTO EACH NOSTRIL DAILY FOR 30 DAYS., Disp: 16 mL, Rfl: 0  •  furosemide (LASIX) 40 MG tablet, Take 1 tablet by mouth Daily., Disp: 90 tablet, Rfl: 3  •  gabapentin (NEURONTIN) 600 MG tablet, Take 1 tablet by mouth 3 (Three) Times a Day., Disp: 90 tablet, Rfl: 5  •  GLUCAGON EMERGENCY 1 MG injection, USE AS DIRECTED AS NEEDED, Disp: 1 kit, Rfl: 0  •  hydrochlorothiazide (HYDRODIURIL) 12.5 MG tablet, Take 1 tablet by mouth Daily., Disp: 90 tablet, Rfl: 0  •  HYDROcodone-acetaminophen (NORCO) 7.5-325 MG per tablet, Take 1 tablet by mouth Every 4 (Four) Hours As Needed for Moderate Pain ., Disp: 180 tablet, Rfl: 0  •  insulin aspart (novoLOG FLEXPEN) 100 UNIT/ML solution pen-injector sc pen, Inject 40 units with meals, Disp: 30 mL, Rfl: 5  •  Insulin Degludec 200 UNIT/ML solution pen-injector, Inject 100 Units under the skin Daily., Disp: 6 pen, Rfl: 11  •  LORazepam (ATIVAN) 1 MG tablet, Take 1 tablet by mouth 2 (Two) Times a Day As Needed for Anxiety., Disp: 60 tablet, Rfl: 2  •  MAGOX 400 400 (241.3 MG) MG tablet tablet, TAKE 1 TABLET BY MOUTH TWICE A DAY, Disp: , Rfl:  12  •  metoprolol succinate XL (TOPROL-XL) 25 MG 24 hr tablet, Take 1 tablet by mouth Daily., Disp: 31 tablet, Rfl: 13  •  mirtazapine (REMERON) 45 MG tablet, TAKE 1 TABLET BY MOUTH EVERY DAY AT BEDTIME, Disp: 30 tablet, Rfl: 5  •  nabumetone (RELAFEN) 500 MG tablet, TAKE 1 TABLET BY MOUTH 2 (TWO) TIMES A DAY AS NEEDED FOR MILD PAIN (1-3)., Disp: 60 tablet, Rfl: 0  •  ondansetron (ZOFRAN) 8 MG tablet, Take 1 tablet by mouth every 8 (eight) hours., Disp: 90 tablet, Rfl: 3  •  pantoprazole (PROTONIX) 40 MG EC tablet, Take 1 tablet by mouth daily., Disp: 90 tablet, Rfl: 3  •  ranolazine (RANEXA) 500 MG 12 hr tablet, Take 2 tablets by mouth 2 (Two) Times a Day., Disp: 120 tablet, Rfl: 6  •  sodium-potassium-magnesium sulfates (SUPREP BOWEL PREP KIT) 17.5-3.13-1.6 GM/180ML solution oral solution, Take 1 bottle by mouth Every 12 (Twelve) Hours., Disp: 2 bottle, Rfl: 0  •  venlafaxine XR (EFFEXOR-XR) 150 MG 24 hr capsule, TAKE ONE CAPSULE BY MOUTH TWICE A DAY FOR DEPRESSION, Disp: 180 capsule, Rfl: 3  •  vitamin D (ERGOCALCIFEROL) 99715 UNITS capsule capsule, TAKE ONE CAPSULE BY MOUTH EVERY SUNDAY, Disp: 12 capsule, Rfl: 2        Physical Exam:  Vitals:    09/15/17 1328   BP: 117/71   Pulse: 79   SpO2: 98%       GEN: alert, appears stated age and cooperative  Body Habitus: well-nourished  JVP: 6 cm of water at 45 degrees HJR: absent      Carotid:  Upstroke: easily palpated bilaterally Volume: Normal.    Carotid Bruit:  None  Subclavian Bruit: Not present.    Lymph: No overt LAD.   Back: Normal.  Chest:  Normal Excursion: Good    I:E: Good  Pulmonary:clear to auscultation, no wheezes, rales or rhonchi, symmetric air entry. Equal chest excursion. Chest physical exam is normal. No tenderness.        Precordium:  No palpable heaves or thrusts. P2 is not palpable.   Hooker:  normal size and placement Palpable S4: Not present.   Heart rate: normal  Heart Rhythm: regular     Heart Sounds: S1: normal intensity  S2: normal  intensity  S3: absent   S4: absent  Opening Snap: absent  A2-OS:  N/A  Pericardial rub: absent    Ejection click: None      Murmurs: Systolic: none  Diastolic: none  Extremity: no edema, cyanosis  Pulses: Right radial artery has 2+ (normal) pulse and Left radial artery has 2+ (normal) pulse    DATA REVIEWED:     Glucose 70 - 100 mg/dL 65 (L)   BUN 8 - 25 mg/dL 30 (H)   Creatinine 0.40 - 1.30 mg/dL 1.30   Sodium 134 - 146 mmol/L 140   Potassium 3.4 - 5.4 mmol/L 3.5   Chloride 100 - 112 mmol/L 97 (L)   CO2 20.0 - 32.0 mmol/L 29.0   Calcium 8.4 - 10.8 mg/dL 9.4   eGFR Non African Amer 51 - 120 mL/min/1.73 43 (L)   BUN/Creatinine Ratio 7.0 - 25.0 23.1   Anion Gap 5.0 - 15.0 mmol/L 14.0   Resulting Agency  [facility]         Right heart pressures:  RA:                 14/31 with a mean of 19                                    RV:                 41/10 with a mean of 16                                    PA:                  40/10 with a mean of 26                                    PCWP:            17                                            DP     Resistance:  SVR:               1517 dynes · sec/cm5              PVR: 143 dynes · sec/cm5     Saturations: N/A     Cardiac Outputs:  Thermal CO: 3.9  Thermal CI: 1.7     RV Status:  RVSW:  6.7 gm-m/beat  RVSWI:            51 gm-m/m2/beat     · The study is technically difficult for diagnosis. The quality of the study is limited due to poor acoustic windows related to patient body habitus  · Left Ventricle: Left ventricular function is normal. Calculated EF = 57%.  · Left ventricular wall thickness is consistent with mild concentric hypertrophy  · Left ventricular diastolic dysfunction is noted (grade II w/high LAP) consistent with pseudonormalization. Elevated left atrial pressure.  · Right Ventricle: Normal cavity size, wall thickness, systolic function and septal motion noted  · Inferior vena cava not well visualized  · Mild mitral valve regurgitation is  present  · PASP is estimated between 37 and 53 mmHg. Unable to evaluate right-sided filling pressures. Unable to exclude pulmonary hypertension.  · There is no evidence of pericardial effusion.      · Limited 2D echocardiogram. PAH-protocol  · Estimated EF appears to be in the range of 61 - 65%. Normal left ventricular cavity size noted  · Right Ventricle: Normal right ventricular cavity size, wall thickness and septal motion noted  · Mildly reduced right ventricular systolic function noted. RV S' velocity is 11/2 cm/sec with a TAPSE of 1.5 with an estimated RVEF of 40-45%  · RV Strain = 15%.  · Estimated right ventricular systolic pressure from tricuspid regurgitation is normal (<35 mmHg).  · No evidence of pulmonary hypertension is present.  · There is no evidence of pericardial effusion.      [facility], as above    TTE, 11/2016:  Preserved LVEF    Assessment/Plan      1. HFpEF. NYHA stage C; FC-II.  Today, she is euvolemic and well-perfused.  -Continue current medications; D/C Chlorthalidone and Mag Ox  -6MWT prior to next appt  -ABIs    2. ASCAD. EF: 50%  last documented 11/2016. CCS class I. The patient has been revascularized with LIMA to LAD. The patient has incomplete revascularization. Anatomy with significant obstruction: circumflex.  ASA, Plavix, Atorvastatin  Ranexa, Toprol XL     3. Cardiac Risk Assessment: Known CAD  Recommended ASA/Statin  -Lipid-lowering medications: Lipitor (atorvastatin)    4. Mild MR/TR. NYHA stage B; FC-I.   We'll plan on echocardiogram April 2020    5. Tobacco status: non-smoker     Plan for follow-up: in 6 months.           This document has been electronically signed by Can Kwon MD PhD on September 15, 2017 1:37 PM

## 2017-09-15 NOTE — TELEPHONE ENCOUNTER
Called pt and told her of results for mammo and labs. She wants to get the b12 injections in [place] if possible. 9/15/17

## 2017-09-18 LAB
LAB AP CASE REPORT: NORMAL
LAB AP GYN ADDITIONAL INFORMATION: NORMAL
Lab: NORMAL
PATH INTERP SPEC-IMP: NORMAL
STAT OF ADQ CVX/VAG CYTO-IMP: NORMAL

## 2017-09-23 LAB
BH CV LOWER ARTERIAL LEFT ABI RATIO: 1.1
BH CV LOWER ARTERIAL LEFT DORSALIS PEDIS SYS MAX: 137 MMHG
BH CV LOWER ARTERIAL LEFT POST TIBIAL SYS MAX: 142 MMHG
BH CV LOWER ARTERIAL RIGHT ABI RATIO: 1.09
BH CV LOWER ARTERIAL RIGHT DORSALIS PEDIS SYS MAX: 123 MMHG
BH CV LOWER ARTERIAL RIGHT POST TIBIAL SYS MAX: 140 MMHG
UPPER ARTERIAL LEFT ARM BRACHIAL SYS MAX: 129 MMHG
UPPER ARTERIAL RIGHT ARM BRACHIAL SYS MAX: 116 MMHG

## 2017-09-25 RX ORDER — FLUTICASONE PROPIONATE 50 MCG
SPRAY, SUSPENSION (ML) NASAL
Qty: 16 ML | Refills: 0 | OUTPATIENT
Start: 2017-09-25

## 2017-09-28 DIAGNOSIS — F33.1 DEPRESSION, MAJOR, RECURRENT, MODERATE (HCC): ICD-10-CM

## 2017-09-28 RX ORDER — ARIPIPRAZOLE 15 MG/1
TABLET ORAL
Qty: 30 TABLET | Refills: 5 | Status: SHIPPED | OUTPATIENT
Start: 2017-09-28 | End: 2018-03-06 | Stop reason: SDUPTHER

## 2017-09-28 RX ORDER — ATORVASTATIN CALCIUM 40 MG/1
40 TABLET, FILM COATED ORAL NIGHTLY
Qty: 90 TABLET | Refills: 1 | Status: SHIPPED | OUTPATIENT
Start: 2017-09-28 | End: 2018-03-19 | Stop reason: SDUPTHER

## 2017-09-29 RX ORDER — HYDROCHLOROTHIAZIDE 12.5 MG/1
CAPSULE, GELATIN COATED ORAL
Qty: 90 CAPSULE | Refills: 0 | Status: SHIPPED | OUTPATIENT
Start: 2017-09-29 | End: 2017-12-14 | Stop reason: ALTCHOICE

## 2017-10-03 ENCOUNTER — CLINICAL SUPPORT (OUTPATIENT)
Dept: FAMILY MEDICINE CLINIC | Facility: CLINIC | Age: 57
End: 2017-10-03

## 2017-10-03 DIAGNOSIS — E53.8 LOW VITAMIN B12 LEVEL: ICD-10-CM

## 2017-10-03 PROCEDURE — 96372 THER/PROPH/DIAG INJ SC/IM: CPT | Performed by: FAMILY MEDICINE

## 2017-10-03 RX ADMIN — CYANOCOBALAMIN 1000 MCG: 1000 INJECTION, SOLUTION INTRAMUSCULAR; SUBCUTANEOUS at 11:33

## 2017-10-04 DIAGNOSIS — K21.9 GERD WITHOUT ESOPHAGITIS: ICD-10-CM

## 2017-10-04 RX ORDER — PANTOPRAZOLE SODIUM 40 MG/1
TABLET, DELAYED RELEASE ORAL
Qty: 90 TABLET | Refills: 2 | Status: SHIPPED | OUTPATIENT
Start: 2017-10-04 | End: 2018-06-21 | Stop reason: SDUPTHER

## 2017-10-09 DIAGNOSIS — G89.4 CHRONIC PAIN DISORDER: ICD-10-CM

## 2017-10-09 RX ORDER — HYDROCODONE BITARTRATE AND ACETAMINOPHEN 7.5; 325 MG/1; MG/1
1 TABLET ORAL EVERY 4 HOURS PRN
Qty: 180 TABLET | Refills: 0 | Status: SHIPPED | OUTPATIENT
Start: 2017-10-09 | End: 2017-11-08 | Stop reason: SDUPTHER

## 2017-10-10 ENCOUNTER — CLINICAL SUPPORT (OUTPATIENT)
Dept: FAMILY MEDICINE CLINIC | Facility: CLINIC | Age: 57
End: 2017-10-10

## 2017-10-10 DIAGNOSIS — E53.8 LOW VITAMIN B12 LEVEL: ICD-10-CM

## 2017-10-10 PROCEDURE — 96372 THER/PROPH/DIAG INJ SC/IM: CPT | Performed by: FAMILY MEDICINE

## 2017-10-10 RX ADMIN — CYANOCOBALAMIN 1000 MCG: 1000 INJECTION, SOLUTION INTRAMUSCULAR; SUBCUTANEOUS at 10:05

## 2017-10-14 ENCOUNTER — APPOINTMENT (OUTPATIENT)
Dept: CT IMAGING | Facility: HOSPITAL | Age: 57
End: 2017-10-14

## 2017-10-14 ENCOUNTER — HOSPITAL ENCOUNTER (OUTPATIENT)
Facility: HOSPITAL | Age: 57
Setting detail: OBSERVATION
Discharge: HOME | End: 2017-10-16
Attending: FAMILY MEDICINE | Admitting: INTERNAL MEDICINE

## 2017-10-14 ENCOUNTER — APPOINTMENT (OUTPATIENT)
Dept: GENERAL RADIOLOGY | Facility: HOSPITAL | Age: 57
End: 2017-10-14

## 2017-10-14 ENCOUNTER — APPOINTMENT (OUTPATIENT)
Dept: NUCLEAR MEDICINE | Facility: HOSPITAL | Age: 57
End: 2017-10-14

## 2017-10-14 DIAGNOSIS — N18.9 CHRONIC KIDNEY DISEASE, UNSPECIFIED CKD STAGE: ICD-10-CM

## 2017-10-14 DIAGNOSIS — E16.2 HYPOGLYCEMIA: Primary | ICD-10-CM

## 2017-10-14 DIAGNOSIS — R53.1 WEAKNESS: ICD-10-CM

## 2017-10-14 DIAGNOSIS — N39.0 UTI (URINARY TRACT INFECTION), BACTERIAL: ICD-10-CM

## 2017-10-14 DIAGNOSIS — A49.9 UTI (URINARY TRACT INFECTION), BACTERIAL: ICD-10-CM

## 2017-10-14 LAB
ALBUMIN SERPL-MCNC: 4.2 G/DL (ref 3.4–4.8)
ALBUMIN/GLOB SERPL: 1.4 G/DL (ref 1.1–1.8)
ALP SERPL-CCNC: 100 U/L (ref 38–126)
ALT SERPL W P-5'-P-CCNC: 29 U/L (ref 9–52)
AMPHET+METHAMPHET UR QL: NEGATIVE
ANION GAP SERPL CALCULATED.3IONS-SCNC: 13 MMOL/L (ref 5–15)
ARTERIAL PATENCY WRIST A: ABNORMAL
AST SERPL-CCNC: 22 U/L (ref 14–36)
ATMOSPHERIC PRESS: ABNORMAL MMHG
BACTERIA UR QL AUTO: ABNORMAL /HPF
BARBITURATES UR QL SCN: NEGATIVE
BASE EXCESS BLDA CALC-SCNC: 5.3 MMOL/L (ref -2.4–2.4)
BASOPHILS # BLD AUTO: 0.04 10*3/MM3 (ref 0–0.2)
BASOPHILS NFR BLD AUTO: 0.3 % (ref 0–2)
BDY SITE: ABNORMAL
BENZODIAZ UR QL SCN: NEGATIVE
BILIRUB SERPL-MCNC: 0.7 MG/DL (ref 0.2–1.3)
BILIRUB UR QL STRIP: ABNORMAL
BUN BLD-MCNC: 23 MG/DL (ref 7–21)
BUN/CREAT SERPL: 16.1 (ref 7–25)
CA-I BLD-MCNC: 4.6 MG/DL (ref 4.5–4.9)
CALCIUM SPEC-SCNC: 9.4 MG/DL (ref 8.4–10.2)
CANNABINOIDS SERPL QL: NEGATIVE
CHLORIDE SERPL-SCNC: 98 MMOL/L (ref 95–110)
CK MB SERPL-CCNC: 2.42 NG/ML (ref 0–5)
CK SERPL-CCNC: 87 U/L (ref 30–135)
CLARITY UR: ABNORMAL
CO2 BLDA-SCNC: 32.5 MMOL/L (ref 23–27)
CO2 SERPL-SCNC: 28 MMOL/L (ref 22–31)
COCAINE UR QL: NEGATIVE
COLOR UR: YELLOW
CREAT BLD-MCNC: 1.43 MG/DL (ref 0.5–1)
D-DIMER, QUANTITATIVE (MAD,POW, STR): 494 NG/ML (FEU) (ref 0–470)
D-LACTATE SERPL-SCNC: 1.9 MMOL/L (ref 0.5–2)
DEPRECATED RDW RBC AUTO: 45.3 FL (ref 36.4–46.3)
EOSINOPHIL # BLD AUTO: 0.33 10*3/MM3 (ref 0–0.7)
EOSINOPHIL NFR BLD AUTO: 2.7 % (ref 0–7)
ERYTHROCYTE [DISTWIDTH] IN BLOOD BY AUTOMATED COUNT: 14.4 % (ref 11.5–14.5)
ETHANOL BLD-MCNC: <10 MG/DL (ref 0–10)
ETHANOL UR QL: <0.01 %
GFR SERPL CREATININE-BSD FRML MDRD: 38 ML/MIN/1.73 (ref 51–120)
GLOBULIN UR ELPH-MCNC: 3 GM/DL (ref 2.3–3.5)
GLUCOSE BLD-MCNC: 55 MG/DL (ref 60–100)
GLUCOSE BLDA-MCNC: 155 MMOL/L
GLUCOSE BLDC GLUCOMTR-MCNC: 139 MG/DL (ref 70–130)
GLUCOSE UR STRIP-MCNC: NEGATIVE MG/DL
HCO3 BLDA-SCNC: 30.9 MMOL/L (ref 22–26)
HCT VFR BLD AUTO: 36.5 % (ref 35–45)
HCT VFR BLD CALC: 37 % (ref 38–47)
HGB BLD-MCNC: 12.3 G/DL (ref 12–15.5)
HGB BLDA-MCNC: 12.6 G/DL (ref 12–16)
HGB UR QL STRIP.AUTO: NEGATIVE
HOLD SPECIMEN: NORMAL
HOLD SPECIMEN: NORMAL
HYALINE CASTS UR QL AUTO: ABNORMAL /LPF
IMM GRANULOCYTES # BLD: 0.03 10*3/MM3 (ref 0–0.02)
IMM GRANULOCYTES NFR BLD: 0.2 % (ref 0–0.5)
INR PPP: 0.95 (ref 0.8–1.2)
KETONES UR QL STRIP: NEGATIVE
LEUKOCYTE ESTERASE UR QL STRIP.AUTO: ABNORMAL
LYMPHOCYTES # BLD AUTO: 2.95 10*3/MM3 (ref 0.6–4.2)
LYMPHOCYTES NFR BLD AUTO: 23.8 % (ref 10–50)
MAGNESIUM SERPL-MCNC: 1.7 MG/DL (ref 1.6–2.3)
MCH RBC QN AUTO: 29.3 PG (ref 26.5–34)
MCHC RBC AUTO-ENTMCNC: 33.7 G/DL (ref 31.4–36)
MCV RBC AUTO: 86.9 FL (ref 80–98)
METHADONE UR QL SCN: NEGATIVE
MODALITY: ABNORMAL
MONOCYTES # BLD AUTO: 0.99 10*3/MM3 (ref 0–0.9)
MONOCYTES NFR BLD AUTO: 8 % (ref 0–12)
NEUTROPHILS # BLD AUTO: 8.08 10*3/MM3 (ref 2–8.6)
NEUTROPHILS NFR BLD AUTO: 65 % (ref 37–80)
NITRITE UR QL STRIP: POSITIVE
OPIATES UR QL: NEGATIVE
OXYCODONE UR QL SCN: NEGATIVE
PCO2 BLDA: 49.6 MM HG (ref 35–45)
PH BLDA: 7.41 PH UNITS (ref 7.35–7.45)
PH UR STRIP.AUTO: 5.5 [PH] (ref 5–9)
PLATELET # BLD AUTO: 447 10*3/MM3 (ref 150–450)
PMV BLD AUTO: 10 FL (ref 8–12)
PO2 BLDA: 68.8 MM HG (ref 80–105)
POTASSIUM BLD-SCNC: 3.7 MMOL/L (ref 3.5–5.1)
POTASSIUM BLDA-SCNC: 3.89 MMOL/L (ref 3.6–4.9)
PROT SERPL-MCNC: 7.2 G/DL (ref 6.3–8.6)
PROT UR QL STRIP: NEGATIVE
PROTHROMBIN TIME: 12.6 SECONDS (ref 11.1–15.3)
RBC # BLD AUTO: 4.2 10*6/MM3 (ref 3.77–5.16)
RBC # UR: ABNORMAL /HPF
REF LAB TEST METHOD: ABNORMAL
SAO2 % BLDCOA: 93.4 %
SODIUM BLD-SCNC: 139 MMOL/L (ref 137–145)
SODIUM BLDA-SCNC: 137.9 MMOL/L (ref 138–146)
SP GR UR STRIP: 1.02 (ref 1–1.03)
SQUAMOUS #/AREA URNS HPF: ABNORMAL /HPF
TROPONIN I SERPL-MCNC: <0.012 NG/ML
UROBILINOGEN UR QL STRIP: ABNORMAL
WBC NRBC COR # BLD: 12.42 10*3/MM3 (ref 3.2–9.8)
WBC UR QL AUTO: ABNORMAL /HPF
WHOLE BLOOD HOLD SPECIMEN: NORMAL
WHOLE BLOOD HOLD SPECIMEN: NORMAL

## 2017-10-14 PROCEDURE — 80307 DRUG TEST PRSMV CHEM ANLYZR: CPT | Performed by: FAMILY MEDICINE

## 2017-10-14 PROCEDURE — 82803 BLOOD GASES ANY COMBINATION: CPT | Performed by: EMERGENCY MEDICINE

## 2017-10-14 PROCEDURE — G0378 HOSPITAL OBSERVATION PER HR: HCPCS

## 2017-10-14 PROCEDURE — 96375 TX/PRO/DX INJ NEW DRUG ADDON: CPT

## 2017-10-14 PROCEDURE — 96365 THER/PROPH/DIAG IV INF INIT: CPT

## 2017-10-14 PROCEDURE — 85025 COMPLETE CBC W/AUTO DIFF WBC: CPT | Performed by: EMERGENCY MEDICINE

## 2017-10-14 PROCEDURE — 87086 URINE CULTURE/COLONY COUNT: CPT | Performed by: EMERGENCY MEDICINE

## 2017-10-14 PROCEDURE — 84484 ASSAY OF TROPONIN QUANT: CPT | Performed by: FAMILY MEDICINE

## 2017-10-14 PROCEDURE — 82550 ASSAY OF CK (CPK): CPT | Performed by: EMERGENCY MEDICINE

## 2017-10-14 PROCEDURE — 83735 ASSAY OF MAGNESIUM: CPT | Performed by: FAMILY MEDICINE

## 2017-10-14 PROCEDURE — 93010 ELECTROCARDIOGRAM REPORT: CPT | Performed by: INTERNAL MEDICINE

## 2017-10-14 PROCEDURE — 71010 HC CHEST PA OR AP: CPT

## 2017-10-14 PROCEDURE — 80053 COMPREHEN METABOLIC PANEL: CPT | Performed by: FAMILY MEDICINE

## 2017-10-14 PROCEDURE — 85610 PROTHROMBIN TIME: CPT | Performed by: FAMILY MEDICINE

## 2017-10-14 PROCEDURE — 96361 HYDRATE IV INFUSION ADD-ON: CPT

## 2017-10-14 PROCEDURE — 25010000002 CEFTRIAXONE: Performed by: EMERGENCY MEDICINE

## 2017-10-14 PROCEDURE — 96372 THER/PROPH/DIAG INJ SC/IM: CPT

## 2017-10-14 PROCEDURE — 25010000002 ONDANSETRON PER 1 MG: Performed by: EMERGENCY MEDICINE

## 2017-10-14 PROCEDURE — 85379 FIBRIN DEGRADATION QUANT: CPT | Performed by: EMERGENCY MEDICINE

## 2017-10-14 PROCEDURE — 83605 ASSAY OF LACTIC ACID: CPT | Performed by: EMERGENCY MEDICINE

## 2017-10-14 PROCEDURE — 87186 SC STD MICRODIL/AGAR DIL: CPT | Performed by: EMERGENCY MEDICINE

## 2017-10-14 PROCEDURE — 93005 ELECTROCARDIOGRAM TRACING: CPT

## 2017-10-14 PROCEDURE — 96376 TX/PRO/DX INJ SAME DRUG ADON: CPT

## 2017-10-14 PROCEDURE — 87077 CULTURE AEROBIC IDENTIFY: CPT | Performed by: EMERGENCY MEDICINE

## 2017-10-14 PROCEDURE — 25010000002 ONDANSETRON PER 1 MG: Performed by: FAMILY MEDICINE

## 2017-10-14 PROCEDURE — 99285 EMERGENCY DEPT VISIT HI MDM: CPT

## 2017-10-14 PROCEDURE — A9539 TC99M PENTETATE: HCPCS | Performed by: INTERNAL MEDICINE

## 2017-10-14 PROCEDURE — 81001 URINALYSIS AUTO W/SCOPE: CPT | Performed by: EMERGENCY MEDICINE

## 2017-10-14 PROCEDURE — 82553 CREATINE MB FRACTION: CPT | Performed by: EMERGENCY MEDICINE

## 2017-10-14 PROCEDURE — 87040 BLOOD CULTURE FOR BACTERIA: CPT | Performed by: EMERGENCY MEDICINE

## 2017-10-14 PROCEDURE — 82962 GLUCOSE BLOOD TEST: CPT

## 2017-10-14 PROCEDURE — 96374 THER/PROPH/DIAG INJ IV PUSH: CPT

## 2017-10-14 PROCEDURE — 25010000002 ENOXAPARIN PER 10 MG: Performed by: EMERGENCY MEDICINE

## 2017-10-14 PROCEDURE — 78582 LUNG VENTILAT&PERFUS IMAGING: CPT

## 2017-10-14 PROCEDURE — 25010000002 KETOROLAC TROMETHAMINE PER 15 MG: Performed by: INTERNAL MEDICINE

## 2017-10-14 PROCEDURE — 0 TECHNETIUM TC 99M PENTETATE KIT: Performed by: INTERNAL MEDICINE

## 2017-10-14 PROCEDURE — 25010000002 ONDANSETRON PER 1 MG: Performed by: INTERNAL MEDICINE

## 2017-10-14 PROCEDURE — 70450 CT HEAD/BRAIN W/O DYE: CPT

## 2017-10-14 RX ORDER — SODIUM CHLORIDE 0.9 % (FLUSH) 0.9 %
10 SYRINGE (ML) INJECTION AS NEEDED
Status: DISCONTINUED | OUTPATIENT
Start: 2017-10-14 | End: 2017-10-14

## 2017-10-14 RX ORDER — PANTOPRAZOLE SODIUM 40 MG/1
40 TABLET, DELAYED RELEASE ORAL
Status: DISCONTINUED | OUTPATIENT
Start: 2017-10-15 | End: 2017-10-16 | Stop reason: HOSPADM

## 2017-10-14 RX ORDER — METOPROLOL SUCCINATE 25 MG/1
25 TABLET, EXTENDED RELEASE ORAL DAILY
Status: DISCONTINUED | OUTPATIENT
Start: 2017-10-15 | End: 2017-10-16 | Stop reason: HOSPADM

## 2017-10-14 RX ORDER — DEXTROSE MONOHYDRATE 25 G/50ML
25 INJECTION, SOLUTION INTRAVENOUS
Status: DISCONTINUED | OUTPATIENT
Start: 2017-10-14 | End: 2017-10-16 | Stop reason: HOSPADM

## 2017-10-14 RX ORDER — NICOTINE POLACRILEX 4 MG
15 LOZENGE BUCCAL
Status: DISCONTINUED | OUTPATIENT
Start: 2017-10-14 | End: 2017-10-16 | Stop reason: HOSPADM

## 2017-10-14 RX ORDER — KETOROLAC TROMETHAMINE 15 MG/ML
15 INJECTION, SOLUTION INTRAMUSCULAR; INTRAVENOUS EVERY 6 HOURS PRN
Status: DISCONTINUED | OUTPATIENT
Start: 2017-10-14 | End: 2017-10-16 | Stop reason: HOSPADM

## 2017-10-14 RX ORDER — SODIUM CHLORIDE 9 MG/ML
125 INJECTION, SOLUTION INTRAVENOUS CONTINUOUS
Status: DISCONTINUED | OUTPATIENT
Start: 2017-10-14 | End: 2017-10-15

## 2017-10-14 RX ORDER — IPRATROPIUM BROMIDE AND ALBUTEROL SULFATE 2.5; .5 MG/3ML; MG/3ML
3 SOLUTION RESPIRATORY (INHALATION) ONCE
Status: COMPLETED | OUTPATIENT
Start: 2017-10-14 | End: 2017-10-14

## 2017-10-14 RX ORDER — LORAZEPAM 1 MG/1
1 TABLET ORAL 2 TIMES DAILY PRN
Status: DISCONTINUED | OUTPATIENT
Start: 2017-10-14 | End: 2017-10-16 | Stop reason: HOSPADM

## 2017-10-14 RX ORDER — ASPIRIN 81 MG/1
81 TABLET, CHEWABLE ORAL DAILY
Status: DISCONTINUED | OUTPATIENT
Start: 2017-10-15 | End: 2017-10-16 | Stop reason: HOSPADM

## 2017-10-14 RX ORDER — MIRTAZAPINE 15 MG/1
45 TABLET, FILM COATED ORAL NIGHTLY
Status: DISCONTINUED | OUTPATIENT
Start: 2017-10-14 | End: 2017-10-16 | Stop reason: HOSPADM

## 2017-10-14 RX ORDER — DEXTROSE MONOHYDRATE 25 G/50ML
50 INJECTION, SOLUTION INTRAVENOUS
Status: DISCONTINUED | OUTPATIENT
Start: 2017-10-14 | End: 2017-10-16 | Stop reason: HOSPADM

## 2017-10-14 RX ORDER — CLOPIDOGREL BISULFATE 75 MG/1
75 TABLET ORAL DAILY
Status: DISCONTINUED | OUTPATIENT
Start: 2017-10-15 | End: 2017-10-16 | Stop reason: HOSPADM

## 2017-10-14 RX ORDER — NAPROXEN 250 MG/1
250 TABLET ORAL 3 TIMES DAILY PRN
Status: DISCONTINUED | OUTPATIENT
Start: 2017-10-14 | End: 2017-10-16 | Stop reason: HOSPADM

## 2017-10-14 RX ORDER — ONDANSETRON 2 MG/ML
4 INJECTION INTRAMUSCULAR; INTRAVENOUS ONCE
Status: COMPLETED | OUTPATIENT
Start: 2017-10-14 | End: 2017-10-14

## 2017-10-14 RX ORDER — GABAPENTIN 300 MG/1
300 CAPSULE ORAL NIGHTLY
Status: DISCONTINUED | OUTPATIENT
Start: 2017-10-14 | End: 2017-10-16 | Stop reason: HOSPADM

## 2017-10-14 RX ORDER — ATORVASTATIN CALCIUM 40 MG/1
40 TABLET, FILM COATED ORAL NIGHTLY
Status: DISCONTINUED | OUTPATIENT
Start: 2017-10-14 | End: 2017-10-16 | Stop reason: HOSPADM

## 2017-10-14 RX ORDER — ONDANSETRON 2 MG/ML
4 INJECTION INTRAMUSCULAR; INTRAVENOUS ONCE
Status: DISCONTINUED | OUTPATIENT
Start: 2017-10-14 | End: 2017-10-14

## 2017-10-14 RX ORDER — SODIUM CHLORIDE 0.9 % (FLUSH) 0.9 %
10 SYRINGE (ML) INJECTION AS NEEDED
Status: DISCONTINUED | OUTPATIENT
Start: 2017-10-14 | End: 2017-10-16 | Stop reason: HOSPADM

## 2017-10-14 RX ORDER — RANOLAZINE 500 MG/1
1000 TABLET, EXTENDED RELEASE ORAL 2 TIMES DAILY
Status: DISCONTINUED | OUTPATIENT
Start: 2017-10-15 | End: 2017-10-16 | Stop reason: HOSPADM

## 2017-10-14 RX ORDER — ARIPIPRAZOLE 15 MG/1
15 TABLET ORAL DAILY
Status: DISCONTINUED | OUTPATIENT
Start: 2017-10-15 | End: 2017-10-16 | Stop reason: HOSPADM

## 2017-10-14 RX ORDER — ONDANSETRON 2 MG/ML
4 INJECTION INTRAMUSCULAR; INTRAVENOUS EVERY 6 HOURS PRN
Status: DISCONTINUED | OUTPATIENT
Start: 2017-10-14 | End: 2017-10-16 | Stop reason: HOSPADM

## 2017-10-14 RX ORDER — VENLAFAXINE HYDROCHLORIDE 75 MG/1
150 CAPSULE, EXTENDED RELEASE ORAL
Status: DISCONTINUED | OUTPATIENT
Start: 2017-10-15 | End: 2017-10-16 | Stop reason: HOSPADM

## 2017-10-14 RX ADMIN — ATORVASTATIN CALCIUM 40 MG: 40 TABLET, FILM COATED ORAL at 22:21

## 2017-10-14 RX ADMIN — ONDANSETRON 4 MG: 2 INJECTION INTRAMUSCULAR; INTRAVENOUS at 20:25

## 2017-10-14 RX ADMIN — ONDANSETRON 4 MG: 2 INJECTION INTRAMUSCULAR; INTRAVENOUS at 22:00

## 2017-10-14 RX ADMIN — Medication 1 DOSE: at 11:25

## 2017-10-14 RX ADMIN — LORAZEPAM 1 MG: 1 TABLET ORAL at 22:13

## 2017-10-14 RX ADMIN — KETOROLAC TROMETHAMINE 15 MG: 15 INJECTION, SOLUTION INTRAMUSCULAR; INTRAVENOUS at 22:13

## 2017-10-14 RX ADMIN — DEXTROSE MONOHYDRATE 50 ML: 500 INJECTION PARENTERAL at 18:38

## 2017-10-14 RX ADMIN — KIT FOR THE PREPARATION OF TECHNETIUM TC 99M PENTETATE 1 DOSE: 20 INJECTION, POWDER, LYOPHILIZED, FOR SOLUTION INTRAVENOUS; RESPIRATORY (INHALATION) at 23:00

## 2017-10-14 RX ADMIN — GABAPENTIN 300 MG: 300 CAPSULE ORAL at 22:13

## 2017-10-14 RX ADMIN — ENOXAPARIN SODIUM 110 MG: 120 INJECTION SUBCUTANEOUS at 20:39

## 2017-10-14 RX ADMIN — ONDANSETRON 4 MG: 2 INJECTION INTRAMUSCULAR; INTRAVENOUS at 18:51

## 2017-10-14 RX ADMIN — SODIUM CHLORIDE 125 ML/HR: 9 INJECTION, SOLUTION INTRAVENOUS at 19:40

## 2017-10-14 RX ADMIN — IPRATROPIUM BROMIDE AND ALBUTEROL SULFATE 3 ML: 2.5; .5 SOLUTION RESPIRATORY (INHALATION) at 19:44

## 2017-10-14 RX ADMIN — CEFTRIAXONE 1 G: 1 INJECTION, POWDER, FOR SOLUTION INTRAMUSCULAR; INTRAVENOUS at 21:01

## 2017-10-14 RX ADMIN — MIRTAZAPINE 45 MG: 15 TABLET, FILM COATED ORAL at 22:21

## 2017-10-14 NOTE — ED PROVIDER NOTES
Subjective   Patient is a 57 y.o. female presenting with syncope.   History provided by:  Patient   used: No    Syncope   Episode history:  Unable to specify  Most recent episode:  Today  Timing:  Intermittent  Progression:  Unchanged  Chronicity:  Recurrent  Context: standing up    Witnessed: yes    Relieved by:  Nothing  Worsened by:  Nothing  Ineffective treatments:  None tried  Associated symptoms: confusion and weakness    Weakness:     Severity:  Moderate    Onset quality:  Gradual    Timing:  Intermittent    Progression:  Unchanged    Chronicity:  Recurrent      Review of Systems   Cardiovascular: Positive for syncope.   Neurological: Positive for weakness. Negative for syncope.   Psychiatric/Behavioral: Positive for confusion.   All other systems reviewed and are negative.      Past Medical History:   Diagnosis Date   • Acquired hypothyroidism    • Angina, class IV    • Anxiety    • Benign paroxysmal positional vertigo    • Carpal tunnel syndrome    • Chronic pain    • Coronary atherosclerosis    • Depression    • Diabetes mellitus     Type 2, controlled   • Diabetic polyneuropathy    • Female stress incontinence    • Hashimoto's thyroiditis    • Hyperlipidemia    • Hypertension    • Low back pain    • Malaise and fatigue    • Multiple joint pain    • Obesity     Refuses to be weighed   • Otalgia     Both   • Perforation of tympanic membrane     Left   • Postoperative wound infection    • Vitamin D deficiency        Allergies   Allergen Reactions   • Seroquel [Quetiapine Fumarate] Anaphylaxis   • Avandia [Rosiglitazone] Swelling   • Morphine And Related Hallucinations     If patient takes to much       Past Surgical History:   Procedure Laterality Date   • ABDOMINAL SURGERY     • ANGIOPLASTY      coronary   • BREAST BIOPSY Right    • CARDIAC CATHETERIZATION     • CARDIAC CATHETERIZATION N/A 6/20/2017    Procedure: Right Heart Cath;  Surgeon: Can Kwon MD PhD;  Location:   [facility] INVASIVE LOCATION;  Service:    • CARPAL TUNNEL RELEASE     • CHOLECYSTECTOMY     • CORONARY ARTERY BYPASS GRAFT     • ENDOSCOPY AND COLONOSCOPY     • FOOT SURGERY      Toes   • GASTRIC BANDING      Revision, laparoscopic   • HYSTERECTOMY     • MOUTH SURGERY     • SALPINGO OOPHORECTOMY     • SHOULDER SURGERY     • TRANSESOPHAGEAL ECHOCARDIOGRAM (LAMONTE)      With color flow       Family History   Problem Relation Age of Onset   • Diabetes Other    • Heart disease Other    • Hypertension Other    • Heart disease Mother    • Stroke Mother    • Hypertension Mother    • Diabetes Sister    • Heart disease Sister    • Hypertension Sister    • Heart disease Sister    • Diabetes Sister    • Hypertension Sister    • Diabetes Sister    • Diabetes Sister    • Diabetes Sister    • Diabetes Sister        Social History     Social History   • Marital status:      Spouse name: N/A   • Number of children: N/A   • Years of education: N/A     Social History Main Topics   • Smoking status: Never Smoker   • Smokeless tobacco: Never Used   • Alcohol use No   • Drug use: No   • Sexual activity: Defer      Comment:      Other Topics Concern   • None     Social History Narrative           Objective   Physical Exam   Constitutional: She is oriented to person, place, and time. She appears well-developed and well-nourished.   HENT:   Head: Normocephalic and atraumatic.   Right Ear: External ear normal.   Eyes: Pupils are equal, round, and reactive to light.   Neck: Normal range of motion. Neck supple.   Cardiovascular: Normal rate, regular rhythm, normal heart sounds and intact distal pulses.    Pulmonary/Chest: Effort normal and breath sounds normal.   Abdominal: Soft. Bowel sounds are normal.   Neurological: She is alert and oriented to person, place, and time. No cranial nerve deficit or sensory deficit. GCS eye subscore is 4. GCS verbal subscore is 5. GCS motor subscore is 6.   Skin: Skin is warm and dry.        Procedures         ED Course  ED Course      Labs Reviewed   COMPREHENSIVE METABOLIC PANEL - Abnormal; Notable for the following:        Result Value    Glucose 55 (*)     BUN 23 (*)     Creatinine 1.43 (*)     eGFR Non  Amer 38 (*)     All other components within normal limits   URINALYSIS W/ CULTURE IF INDICATED - Abnormal; Notable for the following:     Appearance, UA Cloudy (*)     Bilirubin, UA Small (1+) (*)     Leuk Esterase, UA Large (3+) (*)     Nitrite, UA Positive (*)     All other components within normal limits   CBC WITH AUTO DIFFERENTIAL - Abnormal; Notable for the following:     WBC 12.42 (*)     Monocytes, Absolute 0.99 (*)     Immature Grans, Absolute 0.03 (*)     All other components within normal limits   BLOOD GAS, ARTERIAL - Abnormal; Notable for the following:     pCO2, Arterial 49.6 (*)     pO2, Arterial 68.8 (*)     HCO3, Arterial 30.9 (*)     Base Excess, Arterial 5.3 (*)     Hematocrit, Blood Gas 37.0 (*)     CO2 Content 32.5 (*)     Sodium, Arterial 137.9 (*)     All other components within normal limits   D-DIMER, QUANTITATIVE - Abnormal; Notable for the following:     D-Dimer, Quantitative 494 (*)     All other components within normal limits    Narrative:     Dimer values <500 ng/ml FEU are FDA approved as aid in diagnosis of deep venous thrombosis and pulmonary embolism.  This test should not be used in an exclusion strategy with pretest probability alone.    A recent guideline regarding diagnosis for pulmonary thomboembolism recommends an adjusted exclusion criterion of age x 10 ng/ml FEU for patients >50 years of age ([employer] Intern Med 2015; 163: 701-711).   URINALYSIS, MICROSCOPIC ONLY - Abnormal; Notable for the following:     WBC, UA 21-30 (*)     Bacteria, UA 4+ (*)     Squamous Epithelial Cells, UA 13-20 (*)     All other components within normal limits   POCT GLUCOSE FINGERSTICK - Abnormal; Notable for the following:     Glucose 139 (*)     All other components  within normal limits   MAGNESIUM - Normal   PROTIME-INR - Normal    Narrative:     Therapeutic range for most indications is 2.0-3.0 INR,  or 2.5-3.5 for mechanical heart valves.   TROPONIN (IN-HOUSE) - Normal   URINE DRUG SCREEN - Normal    Narrative:     Negative Thresholds For Drugs Screened in Urine:     Amphetamines          500 ng/ml  Barbiturates          200 ng/ml  Benzodiazepines       200 ng/ml  Cocaine               150 ng/ml  Methadone             300 ng/mL  Opiates               300 ng/mL  Oxycodone             100 ng/mL  THC                   20 ng/mL    The normal value for all drugs tested is negative. This report includes final unconfirmed screening results.  A positive result by this assay can be, at your request, sent to the Reference Lab for confirmation by gas chromatography. Unconfirmed results must not be used for non-medical purposes, such as employment or legal testing. Clinical consideration should be applied to any drug of abuse test result, particularly when unconfirmed results are used.   CK - Normal   CK MB - Normal   URINE CULTURE   BLOOD CULTURE   BLOOD CULTURE   RAINBOW DRAW    Narrative:     The following orders were created for panel order [place] Draw.  Procedure                               Abnormality         Status                     ---------                               -----------         ------                     Light Blue Top[[id number]]                                   Final result               Green Top (Gel)[[id number]]                                  Final result               Lavender Top[[id number]]                                     Final result               Gold Top - SST[[id number]]                                   Final result                 Please view results for these tests on the individual orders.   ETHANOL   LACTIC ACID, PLASMA   LIGHT BLUE TOP   GREEN TOP   LAVENDER TOP   GOLD TOP - SST   CBC AND DIFFERENTIAL    Narrative:     The following orders  were created for panel order CBC & Differential.  Procedure                               Abnormality         Status                     ---------                               -----------         ------                     CBC Auto Differential[[id number]]        Abnormal            Final result                 Please view results for these tests on the individual orders.     Ct Head Without Contrast    Result Date: 10/14/2017  Narrative: Patient Name:  MRS. CHIQUITA BAILEY Patient ID:  [id number] Ordering:  SHAHRZAD BOB Attending:  SHAHRZAD BOB Referring:  SHAHRZAD BOB ------------------------------------------------ CT Head Without Contrast History: Confusion. Delirium. Altered level of consciousness. Axial scans of the brain were obtained without intravenous contrast.  Coronal and sagital reconstructions were preformed. This exam was performed according to our departmental dose-optimization program, which includes automated exposure control, adjustment of the mA and/or kV according to patient size and/or use of iterative reconstruction technique. DLP: 1042.10 Comparison: August 30, 2017. Incidental hyperostosis frontalis interna. Patient is edentulous. The visualized paranasal sinuses are unremarkable. No acute process. Minimal cerebral atrophy. No hemorrhage. No mass. No abnormal areas of increased or decreased attenuation. No midline shift. No abnormal extra-axial fluid collections.     Impression: CONCLUSION: No acute process. Minimal cerebral atrophy. [id number] Electronically signed by:  David Gamboa MD  10/14/2017 6:21 PM CDT Workstation: [employer] Chest 1 View    Result Date: 10/14/2017  Narrative: Patient Name:  MRS. CHIQUITA BAILEY Patient ID:  [id number] Ordering:  SHAHRZAD BOB Attending:  SHAHRZAD BOB Referring:  SHAHRZAD BOB ------------------------------------------------ PORTABLE CHEST HISTORY: Lower extremity pain. Confusion. Altered mental status.  Portable AP upright film of the chest was obtained at 5:29 PM. COMPARISON: August 30, 2017 EKG leads in place. Unchanged elevation or eventration right hemidiaphragm. The lungs are clear of an acute process. Sternotomy. The heart is not enlarged. The pulmonary vasculature is not increased. No pleural effusion. No pneumothorax. No acute osseous abnormality. Degenerative changes are present in the thoracic spine. Surgical clips right upper quadrant of the abdomen.     Impression: CONCLUSION: No Acute Disease Sternotomy [id number] Electronically signed by:  David Gamboa MD  10/14/2017 6:17 PM CDT Workstation: "[employer]                [facility]    Final diagnoses:   Hypoglycemia   UTI (urinary tract infection), bacterial   Chronic kidney disease, unspecified CKD stage   Weakness            True Crocker MD  10/14/17 2025

## 2017-10-15 ENCOUNTER — APPOINTMENT (OUTPATIENT)
Dept: ULTRASOUND IMAGING | Facility: HOSPITAL | Age: 57
End: 2017-10-15

## 2017-10-15 LAB
ALBUMIN SERPL-MCNC: 3.4 G/DL (ref 3.4–4.8)
ALBUMIN/GLOB SERPL: 1.4 G/DL (ref 1.1–1.8)
ALP SERPL-CCNC: 97 U/L (ref 38–126)
ALT SERPL W P-5'-P-CCNC: 22 U/L (ref 9–52)
ANION GAP SERPL CALCULATED.3IONS-SCNC: 12 MMOL/L (ref 5–15)
AST SERPL-CCNC: 13 U/L (ref 14–36)
BILIRUB SERPL-MCNC: 0.5 MG/DL (ref 0.2–1.3)
BUN BLD-MCNC: 20 MG/DL (ref 7–21)
BUN/CREAT SERPL: 17.2 (ref 7–25)
CALCIUM SPEC-SCNC: 8.7 MG/DL (ref 8.4–10.2)
CHLORIDE SERPL-SCNC: 102 MMOL/L (ref 95–110)
CK SERPL-CCNC: 76 U/L (ref 30–135)
CO2 SERPL-SCNC: 25 MMOL/L (ref 22–31)
CREAT BLD-MCNC: 1.16 MG/DL (ref 0.5–1)
DEPRECATED RDW RBC AUTO: 46.4 FL (ref 36.4–46.3)
ERYTHROCYTE [DISTWIDTH] IN BLOOD BY AUTOMATED COUNT: 14.2 % (ref 11.5–14.5)
ERYTHROCYTE [SEDIMENTATION RATE] IN BLOOD: 30 MM/HR (ref 0–20)
GFR SERPL CREATININE-BSD FRML MDRD: 49 ML/MIN/1.73 (ref 51–120)
GLOBULIN UR ELPH-MCNC: 2.5 GM/DL (ref 2.3–3.5)
GLUCOSE BLD-MCNC: 339 MG/DL (ref 60–100)
GLUCOSE BLDC GLUCOMTR-MCNC: 179 MG/DL (ref 70–130)
GLUCOSE BLDC GLUCOMTR-MCNC: 267 MG/DL (ref 70–130)
GLUCOSE BLDC GLUCOMTR-MCNC: 284 MG/DL (ref 70–130)
GLUCOSE BLDC GLUCOMTR-MCNC: 360 MG/DL (ref 70–130)
HCT VFR BLD AUTO: 35.6 % (ref 35–45)
HGB BLD-MCNC: 11.7 G/DL (ref 12–15.5)
MCH RBC QN AUTO: 29.2 PG (ref 26.5–34)
MCHC RBC AUTO-ENTMCNC: 32.9 G/DL (ref 31.4–36)
MCV RBC AUTO: 88.8 FL (ref 80–98)
PLATELET # BLD AUTO: 391 10*3/MM3 (ref 150–450)
PMV BLD AUTO: 9.7 FL (ref 8–12)
POTASSIUM BLD-SCNC: 4.7 MMOL/L (ref 3.5–5.1)
PROT SERPL-MCNC: 5.9 G/DL (ref 6.3–8.6)
RBC # BLD AUTO: 4.01 10*6/MM3 (ref 3.77–5.16)
SODIUM BLD-SCNC: 139 MMOL/L (ref 137–145)
WBC NRBC COR # BLD: 7.2 10*3/MM3 (ref 3.2–9.8)

## 2017-10-15 PROCEDURE — 94799 UNLISTED PULMONARY SVC/PX: CPT

## 2017-10-15 PROCEDURE — 94760 N-INVAS EAR/PLS OXIMETRY 1: CPT

## 2017-10-15 PROCEDURE — 93970 EXTREMITY STUDY: CPT

## 2017-10-15 PROCEDURE — 82550 ASSAY OF CK (CPK): CPT | Performed by: INTERNAL MEDICINE

## 2017-10-15 PROCEDURE — 0 TECHNETIUM ALBUMIN AGGREGATED: Performed by: INTERNAL MEDICINE

## 2017-10-15 PROCEDURE — 82962 GLUCOSE BLOOD TEST: CPT

## 2017-10-15 PROCEDURE — 96361 HYDRATE IV INFUSION ADD-ON: CPT

## 2017-10-15 PROCEDURE — 25010000002 CEFTRIAXONE: Performed by: INTERNAL MEDICINE

## 2017-10-15 PROCEDURE — 85651 RBC SED RATE NONAUTOMATED: CPT | Performed by: INTERNAL MEDICINE

## 2017-10-15 PROCEDURE — 63710000001 INSULIN ASPART PER 5 UNITS: Performed by: INTERNAL MEDICINE

## 2017-10-15 PROCEDURE — G0378 HOSPITAL OBSERVATION PER HR: HCPCS

## 2017-10-15 PROCEDURE — 25010000002 ONDANSETRON PER 1 MG: Performed by: INTERNAL MEDICINE

## 2017-10-15 PROCEDURE — 80053 COMPREHEN METABOLIC PANEL: CPT | Performed by: NURSE PRACTITIONER

## 2017-10-15 PROCEDURE — A9540 TC99M MAA: HCPCS | Performed by: INTERNAL MEDICINE

## 2017-10-15 PROCEDURE — 25010000002 KETOROLAC TROMETHAMINE PER 15 MG: Performed by: INTERNAL MEDICINE

## 2017-10-15 PROCEDURE — 85027 COMPLETE CBC AUTOMATED: CPT | Performed by: NURSE PRACTITIONER

## 2017-10-15 PROCEDURE — 63710000001 INSULIN ASPART PER 5 UNITS: Performed by: NURSE PRACTITIONER

## 2017-10-15 PROCEDURE — 96376 TX/PRO/DX INJ SAME DRUG ADON: CPT

## 2017-10-15 PROCEDURE — 96365 THER/PROPH/DIAG IV INF INIT: CPT

## 2017-10-15 RX ORDER — FUROSEMIDE 40 MG/1
40 TABLET ORAL DAILY
Status: DISCONTINUED | OUTPATIENT
Start: 2017-10-15 | End: 2017-10-16 | Stop reason: HOSPADM

## 2017-10-15 RX ADMIN — INSULIN ASPART 6 UNITS: 100 INJECTION, SOLUTION INTRAVENOUS; SUBCUTANEOUS at 11:21

## 2017-10-15 RX ADMIN — MIRTAZAPINE 45 MG: 15 TABLET, FILM COATED ORAL at 20:48

## 2017-10-15 RX ADMIN — GABAPENTIN 300 MG: 300 CAPSULE ORAL at 20:48

## 2017-10-15 RX ADMIN — CEFTRIAXONE 1 G: 1 INJECTION, POWDER, FOR SOLUTION INTRAMUSCULAR; INTRAVENOUS at 19:39

## 2017-10-15 RX ADMIN — SODIUM CHLORIDE 125 ML/HR: 9 INJECTION, SOLUTION INTRAVENOUS at 03:42

## 2017-10-15 RX ADMIN — METOPROLOL SUCCINATE 25 MG: 25 TABLET, EXTENDED RELEASE ORAL at 08:34

## 2017-10-15 RX ADMIN — PANTOPRAZOLE SODIUM 40 MG: 40 TABLET, DELAYED RELEASE ORAL at 05:26

## 2017-10-15 RX ADMIN — KETOROLAC TROMETHAMINE 15 MG: 15 INJECTION, SOLUTION INTRAMUSCULAR; INTRAVENOUS at 16:25

## 2017-10-15 RX ADMIN — KETOROLAC TROMETHAMINE 15 MG: 15 INJECTION, SOLUTION INTRAMUSCULAR; INTRAVENOUS at 05:26

## 2017-10-15 RX ADMIN — INSULIN ASPART 4 UNITS: 100 INJECTION, SOLUTION INTRAVENOUS; SUBCUTANEOUS at 08:31

## 2017-10-15 RX ADMIN — ATORVASTATIN CALCIUM 40 MG: 40 TABLET, FILM COATED ORAL at 20:48

## 2017-10-15 RX ADMIN — ARIPIPRAZOLE 15 MG: 15 TABLET ORAL at 08:34

## 2017-10-15 RX ADMIN — KETOROLAC TROMETHAMINE 15 MG: 15 INJECTION, SOLUTION INTRAMUSCULAR; INTRAVENOUS at 23:09

## 2017-10-15 RX ADMIN — CLOPIDOGREL BISULFATE 75 MG: 75 TABLET ORAL at 08:33

## 2017-10-15 RX ADMIN — ONDANSETRON 4 MG: 2 INJECTION INTRAMUSCULAR; INTRAVENOUS at 05:26

## 2017-10-15 RX ADMIN — RANOLAZINE 1000 MG: 500 TABLET, FILM COATED, EXTENDED RELEASE ORAL at 18:10

## 2017-10-15 RX ADMIN — INSULIN ASPART 40 UNITS: 100 INJECTION, SOLUTION INTRAVENOUS; SUBCUTANEOUS at 18:10

## 2017-10-15 RX ADMIN — RANOLAZINE 1000 MG: 500 TABLET, FILM COATED, EXTENDED RELEASE ORAL at 08:33

## 2017-10-15 RX ADMIN — FUROSEMIDE 40 MG: 40 TABLET ORAL at 20:48

## 2017-10-15 RX ADMIN — SODIUM CHLORIDE 125 ML/HR: 9 INJECTION, SOLUTION INTRAVENOUS at 11:29

## 2017-10-15 RX ADMIN — VENLAFAXINE HYDROCHLORIDE 150 MG: 75 CAPSULE, EXTENDED RELEASE ORAL at 08:34

## 2017-10-15 RX ADMIN — ONDANSETRON 4 MG: 2 INJECTION INTRAMUSCULAR; INTRAVENOUS at 19:45

## 2017-10-15 RX ADMIN — ASPIRIN 81 MG 81 MG: 81 TABLET ORAL at 08:34

## 2017-10-15 NOTE — H&P
[facility] Medicine Admission      Date of Admission: 10/14/2017      Primary Care Physician: Francisca Fong MD      Chief Complaint: lower ext pain     HPI:This a 57 y old female with hx of DM CAD who is reporting having what she calls spells that she decribes as shaking of her lower extremities with pain . That has been ongoing for months now .  She is also weak . She has  n appointment with a neurologist in two weeks .   Today she came to the ED where she was hypoglycemic  She got an amp of D50 and that resolved , her creatinine was elevated and she had a UTI . Her DDimers were mildly elevated . A V/q scan is ordered       Past Medical History:  has a past medical history of Acquired hypothyroidism; Angina, class IV; Anxiety; Benign paroxysmal positional vertigo; Carpal tunnel syndrome; Chronic pain; Coronary atherosclerosis; Depression; Diabetes mellitus; Diabetic polyneuropathy; Female stress incontinence; Hashimoto's thyroiditis; Hyperlipidemia; Hypertension; Low back pain; Malaise and fatigue; Multiple joint pain; Obesity; Otalgia; Perforation of tympanic membrane; Postoperative wound infection; and Vitamin D deficiency.    Past Surgical History:  has a past surgical history that includes Abdominal surgery; Angioplasty; Coronary artery bypass graft; Cardiac catheterization; Carpal tunnel release; Cholecystectomy; endoscopy and colonoscopy; Mouth surgery; transesophageal echocardiogram (mildred); Foot surgery; gastric banding; Hysterectomy; Shoulder surgery; Salpingoophorectomy; Cardiac catheterization (N/A, 6/20/2017); and Breast biopsy (Right).    Family History: family history includes Diabetes in her other, sister, sister, sister, sister, sister, and sister; Heart disease in her mother, other, sister, and sister; Hypertension in her mother, other, sister, and sister; Stroke in her mother.    Social History:  reports that she has never smoked. She has never  used smokeless tobacco. She reports that she does not drink alcohol or use illicit drugs.    Allergies:   Allergies   Allergen Reactions   • Seroquel [Quetiapine Fumarate] Anaphylaxis   • Avandia [Rosiglitazone] Swelling   • Morphine And Related Hallucinations     If patient takes too much       Medications:   Facility-Administered Medications Prior to Admission   Medication Dose Route Frequency Provider Last Rate Last Dose   • cyanocobalamin injection 1,000 mcg  1,000 mcg Intramuscular Q28 Days Francisca Fong MD       • [DISCONTINUED] cyanocobalamin injection 1,000 mcg  1,000 mcg Intramuscular Q28 Days Francisca Fong MD   1,000 mcg at 10/10/17 1005     Prescriptions Prior to Admission   Medication Sig Dispense Refill Last Dose   • ARIPiprazole (ABILIFY) 15 MG tablet TAKE 1 TABLET BY MOUTH DAILY. 30 tablet 5 10/14/2017 at 0800   • aspirin 81 MG chewable tablet Chew 81 mg daily.   10/14/2017 at 0800   • atorvastatin (LIPITOR) 40 MG tablet Take 1 tablet by mouth Every Night. 90 tablet 1 10/13/2017 at 2100   • Calcium Citrate-Vitamin D (CITRACAL/VITAMIN D) 250-200 MG-UNIT tablet Take 2 tablets by mouth 2 (two) times a day.   10/14/2017 at 0800   • clopidogrel (PLAVIX) 75 MG tablet TAKE 1 TABLET BY MOUTH DAILY. 90 tablet 3 10/14/2017 at 0800   • fluticasone (FLONASE) 50 MCG/ACT nasal spray ADMINISTER 2 SPRAYS INTO EACH NOSTRIL DAILY FOR 30 DAYS. (Patient taking differently: ADMINISTER 2 SPRAYS INTO EACH NOSTRIL DAILY prn FOR 30 DAYS.) 16 mL 0 Past Week at Unknown time   • furosemide (LASIX) 40 MG tablet Take 1 tablet by mouth Daily. 90 tablet 3 10/14/2017 at 0800   • gabapentin (NEURONTIN) 600 MG tablet Take 1 tablet by mouth 3 (Three) Times a Day. 90 tablet 5 10/14/2017 at 1200   • hydrochlorothiazide (MICROZIDE) 12.5 MG capsule TAKE 1 CAPSULE BY MOUTH DAILY. 90 capsule 0 10/14/2017 at 0800   • HYDROcodone-acetaminophen (NORCO) 7.5-325 MG per tablet Take 1 tablet by mouth Every 4 (Four) Hours As Needed for  Moderate Pain . 180 tablet 0 10/14/2017 at 1000   • insulin aspart (novoLOG FLEXPEN) 100 UNIT/ML solution pen-injector sc pen Inject 40 units with meals (Patient taking differently: Inject 40 Units under the skin 3 (Three) Times a Day With Meals. Inject 40 units with meals  ) 30 mL 5 10/14/2017 at 1200   • Insulin Degludec 200 UNIT/ML solution pen-injector Inject 100 Units under the skin Daily. (Patient taking differently: Inject 100 Units under the skin Every Night.) 6 pen 11 10/13/2017 at 2100   • LORazepam (ATIVAN) 1 MG tablet Take 1 tablet by mouth 2 (Two) Times a Day As Needed for Anxiety. 60 tablet 2 10/14/2017 at 0800   • metoprolol succinate XL (TOPROL-XL) 25 MG 24 hr tablet Take 1 tablet by mouth Daily. 31 tablet 13 10/14/2017 at 0800   • mirtazapine (REMERON) 45 MG tablet TAKE 1 TABLET BY MOUTH EVERY DAY AT BEDTIME 30 tablet 5 10/13/2017 at 2100   • nabumetone (RELAFEN) 500 MG tablet TAKE 1 TABLET BY MOUTH 2 (TWO) TIMES A DAY AS NEEDED FOR MILD PAIN (1-3). (Patient taking differently: TAKE 1 TABLET BY MOUTH 2 (TWO) TIMES A DAY FOR MILD PAIN (1-3).) 60 tablet 0 10/14/2017 at 0800   • pantoprazole (PROTONIX) 40 MG EC tablet TAKE 1 TABLET BY MOUTH DAILY. 90 tablet 2 10/14/2017 at 0800   • ranolazine (RANEXA) 500 MG 12 hr tablet Take 2 tablets by mouth 2 (Two) Times a Day. 120 tablet 6 10/14/2017 at 0800   • venlafaxine XR (EFFEXOR-XR) 150 MG 24 hr capsule TAKE ONE CAPSULE BY MOUTH TWICE A DAY FOR DEPRESSION 180 capsule 3 10/14/2017 at 0800   • B-D ULTRAFINE III SHORT PEN 31G X 8 MM misc USE AS DIRECTED 4 TIMES DAILY 150 each 11 Taking   • GLUCAGON EMERGENCY 1 MG injection USE AS DIRECTED AS NEEDED 1 kit 0 Taking   • ondansetron (ZOFRAN) 8 MG tablet Take 1 tablet by mouth every 8 (eight) hours. (Patient taking differently: Take 8 mg by mouth Every 8 (Eight) Hours As Needed.) 90 tablet 3 Taking   • vitamin D (ERGOCALCIFEROL) 26094 UNITS capsule capsule TAKE ONE CAPSULE BY MOUTH EVERY SUNDAY 12 capsule 2  10/8/2017       Review of Systems:  Review of Systems   Constitutional: Positive for fatigue. Negative for activity change, appetite change, chills, diaphoresis, fever and unexpected weight change.   HENT: Negative.  Negative for congestion, dental problem, drooling, ear discharge, ear pain, facial swelling, hearing loss, mouth sores, nosebleeds, postnasal drip, rhinorrhea, sinus pressure, sneezing, tinnitus, trouble swallowing and voice change.    Eyes: Negative for photophobia and discharge.   Respiratory: Negative for apnea, cough, choking, shortness of breath, wheezing and stridor.    Cardiovascular: Negative for chest pain, palpitations and leg swelling.   Gastrointestinal: Negative for abdominal pain, anal bleeding, blood in stool, constipation, diarrhea, nausea, rectal pain and vomiting.   Endocrine: Negative for cold intolerance, heat intolerance, polydipsia, polyphagia and polyuria.   Genitourinary: Negative for dysuria, enuresis, frequency, hematuria and urgency.   Musculoskeletal: Positive for myalgias. Negative for arthralgias, back pain, joint swelling, neck pain and neck stiffness.   Skin: Negative for color change, pallor, rash and wound.   Allergic/Immunologic: Negative for food allergies and immunocompromised state.   Neurological: Positive for weakness. Negative for dizziness, tremors, seizures, syncope, facial asymmetry, speech difficulty, light-headedness, numbness and headaches.   Hematological: Negative for adenopathy.   Psychiatric/Behavioral: Negative for agitation, behavioral problems, confusion, hallucinations, sleep disturbance and suicidal ideas. The patient is not nervous/anxious.       Otherwise complete ROS is negative except as mentioned above.    Physical Exam:   Temp:  [97.5 °F (36.4 °C)-98.7 °F (37.1 °C)] 97.5 °F (36.4 °C)  Heart Rate:  [67-80] 75  Resp:  [16-18] 18  BP: ()/(53-82) 121/58  Physical Exam   Constitutional: She is oriented to person, place, and time. She appears  well-developed and well-nourished.   HENT:   Head: Normocephalic and atraumatic.   Right Ear: External ear normal.   Left Ear: External ear normal.   Nose: Nose normal.   Mouth/Throat: Oropharynx is clear and moist.   Eyes: EOM are normal. Pupils are equal, round, and reactive to light.   Neck: Normal range of motion. Neck supple. No JVD present. No tracheal deviation present. No thyromegaly present.   Cardiovascular: Normal rate, regular rhythm, normal heart sounds and intact distal pulses.  Exam reveals no gallop and no friction rub.    No murmur heard.  Pulmonary/Chest: Effort normal and breath sounds normal. No stridor. No respiratory distress. She has no wheezes. She has no rales. She exhibits no tenderness.   Abdominal: Soft. Bowel sounds are normal. She exhibits no distension. There is no tenderness. There is no rebound and no guarding.   Lymphadenopathy:     She has no cervical adenopathy.   Neurological: She is alert and oriented to person, place, and time. She displays normal reflexes. No cranial nerve deficit. She exhibits normal muscle tone. Coordination normal.   Skin: Skin is warm and dry. No rash noted. No erythema. No pallor.   Psychiatric: She has a normal mood and affect. Her behavior is normal. Judgment and thought content normal.         Results Reviewed:  I have personally reviewed current lab, radiology, and data and agree with results.  Lab Results (last 24 hours)     Procedure Component Value Units Date/Time    Magnesium [[id number]]  (Normal) Collected:  10/14/17 1806    Specimen:  Blood Updated:  10/14/17 1824     Magnesium 1.7 mg/dL     Comprehensive Metabolic Panel [[id number]]  (Abnormal) Collected:  10/14/17 1806    Specimen:  Blood Updated:  10/14/17 1824     Glucose 55 (L) mg/dL      BUN 23 (H) mg/dL      Creatinine 1.43 (H) mg/dL      Sodium 139 mmol/L      Potassium 3.7 mmol/L      Chloride 98 mmol/L      CO2 28.0 mmol/L      Calcium 9.4 mg/dL      Total Protein 7.2 g/dL      Albumin  4.20 g/dL      ALT (SGPT) 29 U/L      AST (SGOT) 22 U/L      Alkaline Phosphatase 100 U/L      Total Bilirubin 0.7 mg/dL      eGFR Non African Amer 38 (L) mL/min/1.73      Globulin 3.0 gm/dL      A/G Ratio 1.4 g/dL      BUN/Creatinine Ratio 16.1     Anion Gap 13.0 mmol/L     Protime-INR [[id number]]  (Normal) Collected:  10/14/17 1806    Specimen:  Blood Updated:  10/14/17 1832     Protime 12.6 Seconds      INR 0.95    Narrative:       Therapeutic range for most indications is 2.0-3.0 INR,  or 2.5-3.5 for mechanical heart valves.    Ethanol [[id number]] Collected:  10/14/17 1806    Specimen:  Blood Updated:  10/14/17 1833     Ethanol <10 mg/dL      Ethanol % <0.010 %     Troponin [[id number]]  (Normal) Collected:  10/14/17 1806    Specimen:  Blood Updated:  10/14/17 1835     Troponin I <0.012 ng/mL     [place] Draw [[id number]] Collected:  10/14/17 1806    Specimen:  Blood Updated:  10/14/17 1916    Narrative:       The following orders were created for panel order [place] Draw.  Procedure                               Abnormality         Status                     ---------                               -----------         ------                     Light Blue Top[[id number]]                                   Final result               Green Top (Gel)[[id number]]                                  Final result               Lavender Top[[id number]]                                     Final result               Gold Top - SST[[id number]]                                   Final result                 Please view results for these tests on the individual orders.    Light Blue Top [[id number]] Collected:  10/14/17 1806    Specimen:  Blood Updated:  10/14/17 1916     Extra Tube hold for add-on      Auto resulted       Green Top (Gel) [[id number]] Collected:  10/14/17 1806    Specimen:  Blood Updated:  10/14/17 1916     Extra Tube Hold for add-ons.      Auto resulted.       Lavender Top [[id number]] Collected:  10/14/17 1806     Specimen:  Blood Updated:  10/14/17 1916     Extra Tube hold for add-on      Auto resulted       Gold Top - SST [[id number]] Collected:  10/14/17 1806    Specimen:  Blood Updated:  10/14/17 1916     Extra Tube Hold for add-ons.      Auto resulted.       CBC & Differential [[id number]] Collected:  10/14/17 1806    Specimen:  Blood Updated:  10/14/17 1924    Narrative:       The following orders were created for panel order CBC & Differential.  Procedure                               Abnormality         Status                     ---------                               -----------         ------                     CBC Auto Differential[[id number]]        Abnormal            Final result                 Please view results for these tests on the individual orders.    CBC Auto Differential [[id number]]  (Abnormal) Collected:  10/14/17 1806    Specimen:  Blood Updated:  10/14/17 1924     WBC 12.42 (H) 10*3/mm3      RBC 4.20 10*6/mm3      Hemoglobin 12.3 g/dL      Hematocrit 36.5 %      MCV 86.9 fL      MCH 29.3 pg      MCHC 33.7 g/dL      RDW 14.4 %      RDW-SD 45.3 fl      MPV 10.0 fL      Platelets 447 10*3/mm3      Neutrophil % 65.0 %      Lymphocyte % 23.8 %      Monocyte % 8.0 %      Eosinophil % 2.7 %      Basophil % 0.3 %      Immature Grans % 0.2 %      Neutrophils, Absolute 8.08 10*3/mm3      Lymphocytes, Absolute 2.95 10*3/mm3      Monocytes, Absolute 0.99 (H) 10*3/mm3      Eosinophils, Absolute 0.33 10*3/mm3      Basophils, Absolute 0.04 10*3/mm3      Immature Grans, Absolute 0.03 (H) 10*3/mm3     CK [[id number]]  (Normal) Collected:  10/14/17 1806    Specimen:  Blood Updated:  10/14/17 1928     Creatine Kinase 87 U/L     D-dimer, Quantitative [[id number]]  (Abnormal) Collected:  10/14/17 1806    Specimen:  Blood Updated:  10/14/17 1932     D-Dimer, Quantitative 494 (H) ng/mL (FEU)     Narrative:       Dimer values <500 ng/ml FEU are FDA approved as aid in diagnosis of deep venous thrombosis and pulmonary embolism.   This test should not be used in an exclusion strategy with pretest probability alone.    A recent guideline regarding diagnosis for pulmonary thomboembolism recommends an adjusted exclusion criterion of age x 10 ng/ml FEU for patients >50 years of age ([employer] Intern Med 2015; 163: 701-711).    CK-MB [[id number]]  (Normal) Collected:  10/14/17 1806    Specimen:  Blood Updated:  10/14/17 1938     CKMB 2.42 ng/mL     Blood Gas, Arterial [[id number]]  (Abnormal) Collected:  10/14/17 1940    Specimen:  Arterial Blood from Arm, Left Updated:  10/14/17 1944     Site --      Not performed at this site.        Tonio's Test --      Not performed at this site.        pH, Arterial 7.413 pH units      pCO2, Arterial 49.6 (H) mm Hg      pO2, Arterial 68.8 (L) mm Hg      HCO3, Arterial 30.9 (H) mmol/L      Base Excess, Arterial 5.3 (H) mmol/L      O2 Saturation, Arterial 93.4 %      Hemoglobin, Blood Gas 12.6 g/dL      Hematocrit, Blood Gas 37.0 (L) %      CO2 Content 32.5 (H)     Sodium, Arterial 137.9 (L) mmol/L      Potassium, Arterial 3.89 mmol/L      Glucose, Arterial 155 mmol/L      Barometric Pressure for Blood Gas -- mmHg       Not performed at this site.        Modality --      Not performed at this site.        Ionized Calcium 4.60 mg/dL     Urine Culture - Urine, Urine, Clean Catch [[id number]] Collected:  10/14/17 1934    Specimen:  Urine from Urine, Clean Catch Updated:  10/14/17 1947    POC Glucose Fingerstick [[id number]]  (Abnormal) Collected:  10/14/17 1934    Specimen:  Blood Updated:  10/14/17 1959     Glucose 139 (H) mg/dL       RN NotifiedMeter: [id number]: [id number] ANDREW MARTY       Urine Drug Screen - Urine, Clean Catch [[id number]]  (Normal) Collected:  10/14/17 1934    Specimen:  Urine from Urine, Clean Catch Updated:  10/14/17 2008     Amphetamine Screen, Urine Negative     Barbiturates Screen, Urine Negative     Benzodiazepine Screen, Urine Negative     Cocaine Screen, Urine Negative     Methadone  Screen, Urine Negative     Opiate Screen Negative     Oxycodone Screen, Urine Negative     THC, Screen, Urine Negative    Narrative:       Negative Thresholds For Drugs Screened in Urine:     Amphetamines          500 ng/ml  Barbiturates          200 ng/ml  Benzodiazepines       200 ng/ml  Cocaine               150 ng/ml  Methadone             300 ng/mL  Opiates               300 ng/mL  Oxycodone             100 ng/mL  THC                   20 ng/mL    The normal value for all drugs tested is negative. This report includes final unconfirmed screening results.  A positive result by this assay can be, at your request, sent to the Reference Lab for confirmation by gas chromatography. Unconfirmed results must not be used for non-medical purposes, such as employment or legal testing. Clinical consideration should be applied to any drug of abuse test result, particularly when unconfirmed results are used.    Urinalysis With / Culture If Indicated - Urine, Clean Catch [[id number]]  (Abnormal) Collected:  10/14/17 1934    Specimen:  Urine from Urine, Clean Catch Updated:  10/14/17 2013     Color, UA Yellow     Appearance, UA Cloudy (A)     pH, UA 5.5     Specific Gravity, UA 1.018     Glucose, UA Negative     Ketones, UA Negative     Bilirubin, UA Small (1+) (A)     Blood, UA Negative     Protein, UA Negative     Leuk Esterase, UA Large (3+) (A)     Nitrite, UA Positive (A)     Urobilinogen, UA 1.0 E.U./dL    Urinalysis, Microscopic Only - Urine, Clean Catch [[id number]]  (Abnormal) Collected:  10/14/17 1934    Specimen:  Urine from Urine, Clean Catch Updated:  10/14/17 2025     RBC, UA None Seen /HPF      WBC, UA 21-30 (A) /HPF      Bacteria, UA 4+ (A) /HPF      Squamous Epithelial Cells, UA 13-20 (A) /HPF      Hyaline Casts, UA None Seen /LPF      Methodology Manual Light Microscopy    Blood Culture - Blood, [[id number]] Collected:  10/14/17 2043    Specimen:  Blood from Arm, Left Updated:  10/14/17 2048    Blood Culture -  Blood, [[id number]] Collected:  10/14/17 2025    Specimen:  Blood from Hand, Right Updated:  10/14/17 2048    Lactic Acid, Plasma [[id number]]  (Normal) Collected:  10/14/17 2043    Specimen:  Blood from Arm, Left Updated:  10/14/17 2100     Lactate 1.9 mmol/L         Imaging Results (last 24 hours)     Procedure Component Value Units Date/Time    XR Chest 1 View [[id number]] Collected:  10/14/17 1757     Updated:  10/14/17 1818    Narrative:       Patient Name:  MRS. CHIQUITA BAILEY  Patient ID:  [id number]   Ordering:  SHAHRZAD BOB  Attending:  SHAHRZAD BOB  Referring:  SHAHRZAD BOB  ------------------------------------------------    PORTABLE CHEST    HISTORY: Lower extremity pain. Confusion. Altered mental status.    Portable AP upright film of the chest was obtained at 5:29 PM.  COMPARISON: August 30, 2017    EKG leads in place.  Unchanged elevation or eventration right hemidiaphragm.  The lungs are clear of an acute process.  Sternotomy.  The heart is not enlarged.  The pulmonary vasculature is not increased.  No pleural effusion.  No pneumothorax.  No acute osseous abnormality.  Degenerative changes are present in the thoracic spine.  Surgical clips right upper quadrant of the abdomen.      Impression:       CONCLUSION:  No Acute Disease  Sternotomy    [id number]    Electronically signed by:  David Gamboa MD  10/14/2017 6:17 PM  CDT Workstation: [email or web address]    CT Head Without Contrast [[id number]] Collected:  10/14/17 1757     Updated:  10/14/17 1822    Narrative:       Patient Name:  MRS. CHIQUITA BAILEY  Patient ID:  [id number]   Ordering:  SHAHRZAD BOB  Attending:  SHAHRZAD BOB  Referring:  SHAHRZAD BOB  ------------------------------------------------    CT Head Without Contrast    History: Confusion. Delirium. Altered level of consciousness.    Axial scans of the brain were obtained without intravenous  contrast.  Coronal and sagital reconstructions were preformed.    This  exam was performed according to our departmental  dose-optimization program, which includes automated exposure  control, adjustment of the mA and/or kV according to patient size  and/or use of iterative reconstruction technique.    DLP: 1042.10    Comparison: August 30, 2017.    Incidental hyperostosis frontalis interna.  Patient is edentulous.  The visualized paranasal sinuses are unremarkable.    No acute process.  Minimal cerebral atrophy.  No hemorrhage.  No mass.  No abnormal areas of increased or decreased attenuation.  No midline shift.  No abnormal extra-axial fluid collections.      Impression:       CONCLUSION:  No acute process.  Minimal cerebral atrophy.    [id number]    Electronically signed by:  David Gamboa MD  10/14/2017 6:21 PM  CDT Workstation: [employer]            Assessment:    Hospital Problem List     Hypoglycemia      stefanie on ckd  Lower ext pain   UTI            Plan:  Observation   Iv fluids  Iv antibiotics  Monitor blood sugars   Us doppler lower extremities  Recheck kidney  Function   V/Q scan rule out  PE  Urine culture    patient got one dose of lovenox   I discussed the patients findings and my recommendations with: patient     Naomi Gardner MD  10/14/17  10:42 PM

## 2017-10-15 NOTE — ED NOTES
Contacted Carole in Telemetry in regards to needing a telemetry box. Pt is going to [phone]     Cassy Elder  10/14/17 2046       Kit Cartagena RN  10/14/17 2101

## 2017-10-15 NOTE — PLAN OF CARE
Problem: Patient Care Overview (Adult)  Goal: Plan of Care Review  Outcome: Ongoing (interventions implemented as appropriate)    10/15/17 0346   Coping/Psychosocial Response Interventions   Plan Of Care Reviewed With patient   Patient Care Overview   Progress no change   Outcome Evaluation   Outcome Summary/Follow up Plan Pt alert & oriented, c/o of spells where her knees got week and hands start shaking for the past week, vs stable, gave prn pain med, continued Iv fluids, will continue to monitor.       Goal: Adult Individualization and Mutuality  Outcome: Ongoing (interventions implemented as appropriate)  Goal: Discharge Needs Assessment  Outcome: Ongoing (interventions implemented as appropriate)    Problem: Diabetes, Type 2 (Adult)  Goal: Signs and Symptoms of Listed Potential Problems Will be Absent or Manageable (Diabetes, Type 2)  Outcome: Ongoing (interventions implemented as appropriate)    Problem: Fall Risk (Adult)  Goal: Identify Related Risk Factors and Signs and Symptoms  Outcome: Ongoing (interventions implemented as appropriate)  Goal: Absence of Falls  Outcome: Ongoing (interventions implemented as appropriate)

## 2017-10-15 NOTE — PROGRESS NOTES
[facility] Medicine Services  INPATIENT PROGRESS NOTE    Length of Stay: 0  Date of Admission: 10/14/2017  Primary Care Physician: Francisca Fong MD    Subjective   Chief Complaint: No complaints    HPI:      10/15/2017:  The patient's creatinine has improved with IV fluids, currently at 1.16.  Due to the patient's history of heart failure, we'll discontinue IV fluids and restart her Lasix.  The patient was hypoglycemic with blood sugar 55 on admission yesterday.  Her blood sugars now elevated into the 300s.  We will restart her on her current home medication for diabetes.  She takes 40 units of insulin aspart 3 times daily with meals and 100 units of insulin degludec nightly.  She states her appetite has been normal and she has not been eating less.    H&P by Dr. Gardner:  This a 57 y old female with hx of DM CAD who is reporting having what she calls spells that she decribes as shaking of her lower extremities with pain. That has been ongoing for months now .  She is also weak . She has  n appointment with a neurologist in two weeks .   Today she came to the ED where she was hypoglycemic  She got an amp of D50 and that resolved , her creatinine was elevated and she had a UTI . Her DDimers were mildly elevated . A V/q scan is ordered     Review of Systems   Gastrointestinal: Positive for nausea.   Neurological:        Chronic weakness in her legs.  She states she is scheduled to see a neurologist in 2 weeks.    All other systems reviewed and are negative.     All pertinent negatives and positives are as above. All other systems have been reviewed and are negative unless otherwise stated.     Objective    Temp:  [96.4 °F (35.8 °C)-98.6 °F (37 °C)] 96.9 °F (36.1 °C)  Heart Rate:  [67-80] 77  Resp:  [16-18] 18  BP: ()/(51-67) 109/58    Physical Exam   Constitutional: She is oriented to person, place, and time. She appears well-developed and well-nourished.   HENT:    Head: Normocephalic and atraumatic.   Eyes: EOM are normal. Pupils are equal, round, and reactive to light.   Neck: Normal range of motion. Neck supple.   Cardiovascular: Normal rate and regular rhythm.    Pulmonary/Chest: Effort normal and breath sounds normal.   Abdominal: Soft. Bowel sounds are normal.   Musculoskeletal: Normal range of motion.   Neurological: She is alert and oriented to person, place, and time.   Skin: Skin is warm and dry.   Psychiatric: She has a normal mood and affect.     Results Review:  I have reviewed the labs, radiology results, and diagnostic studies.    Laboratory Data:     Results from last 7 days  Lab Units 10/15/17  1431 10/14/17  1940 10/14/17  1806   SODIUM mmol/L 139  --  139   SODIUM, ARTERIAL mmol/L  --  137.9*  --    POTASSIUM mmol/L 4.7  --  3.7   CHLORIDE mmol/L 102  --  98   CO2 mmol/L 25.0  --  28.0   BUN mg/dL 20  --  23*   CREATININE mg/dL 1.16*  --  1.43*   GLUCOSE mg/dL 339*  --  55*   GLUCOSE, ARTERIAL mmol/L  --  155  --    CALCIUM mg/dL 8.7  --  9.4   BILIRUBIN mg/dL 0.5  --  0.7   ALK PHOS U/L 97  --  100   ALT (SGPT) U/L 22  --  29   AST (SGOT) U/L 13*  --  22   ANION GAP mmol/L 12.0  --  13.0     Estimated Creatinine Clearance: 72.2 mL/min (by C-G formula based on Cr of 1.16).    Results from last 7 days  Lab Units 10/14/17  1806   MAGNESIUM mg/dL 1.7           Results from last 7 days  Lab Units 10/15/17  1431 10/14/17  1806   WBC 10*3/mm3 7.20 12.42*   HEMOGLOBIN g/dL 11.7* 12.3   HEMATOCRIT % 35.6 36.5   PLATELETS 10*3/mm3 391 447       Results from last 7 days  Lab Units 10/14/17  1806   INR  0.95       Culture Data:   Blood Culture   Date Value Ref Range Status   10/14/2017 No growth at less than 24 hours  Preliminary   10/14/2017 No growth at less than 24 hours  Preliminary     Urine Culture   Date Value Ref Range Status   10/14/2017 >100,000 CFU/mL Gram Negative Bacilli (A)  Preliminary     No results found for: RESPCX  No results found for: WOUNDCX  No  results found for: STOOLCX  No components found for: BODYFLD    Radiology Data:   Imaging Results (last 24 hours)     Procedure Component Value Units Date/Time    XR Chest 1 View [[id number]] Collected:  10/14/17 1757     Updated:  10/14/17 1818    Narrative:       Patient Name:  MRS. CHIQUITA BAILEY  Patient ID:  [id number]   Ordering:  SHAHRZAD BOB  Attending:  SHAHRZAD BOB  Referring:  SHAHRZAD BOB  ------------------------------------------------    PORTABLE CHEST    HISTORY: Lower extremity pain. Confusion. Altered mental status.    Portable AP upright film of the chest was obtained at 5:29 PM.  COMPARISON: August 30, 2017    EKG leads in place.  Unchanged elevation or eventration right hemidiaphragm.  The lungs are clear of an acute process.  Sternotomy.  The heart is not enlarged.  The pulmonary vasculature is not increased.  No pleural effusion.  No pneumothorax.  No acute osseous abnormality.  Degenerative changes are present in the thoracic spine.  Surgical clips right upper quadrant of the abdomen.      Impression:       CONCLUSION:  No Acute Disease  Sternotomy    [id number]    Electronically signed by:  David Gamboa MD  10/14/2017 6:17 PM  CDT Workstation: [id number]    CT Head Without Contrast [[id number]] Collected:  10/14/17 1757     Updated:  10/14/17 1822    Narrative:       Patient Name:  MRS. CHIQUITA BAILEY  Patient ID:  [id number]   Ordering:  SHAHRZAD BOB  Attending:  SHAHRZAD BOB  Referring:  SHAHRZAD BOB  ------------------------------------------------    CT Head Without Contrast    History: Confusion. Delirium. Altered level of consciousness.    Axial scans of the brain were obtained without intravenous  contrast.  Coronal and sagital reconstructions were preformed.    This exam was performed according to our departmental  dose-optimization program, which includes automated exposure  control, adjustment of the mA and/or kV according to patient size  and/or  use of iterative reconstruction technique.    DLP: 1042.10    Comparison: August 30, 2017.    Incidental hyperostosis frontalis interna.  Patient is edentulous.  The visualized paranasal sinuses are unremarkable.    No acute process.  Minimal cerebral atrophy.  No hemorrhage.  No mass.  No abnormal areas of increased or decreased attenuation.  No midline shift.  No abnormal extra-axial fluid collections.      Impression:       CONCLUSION:  No acute process.  Minimal cerebral atrophy.    [id number]    Electronically signed by:  David Gamboa MD  10/14/2017 6:21 PM  CDT Workstation: [id number]    NM Lung Ventilation Perfusion [[id number]] Collected:  10/14/17 2246     Updated:  10/14/17 6663    Narrative:       V/Q scan on  10/14/2017     CLINICAL INDICATION: Elevated d-dimer, near syncope    COMPARISON: Chest x-ray from 10/14/2017    FINDINGS: Following the oral inhalation of 30.3 mCi of Tc 99m-  DTPA aerosolized, multiple projections of the chest are obtained.  Then following the intravenous administration of 6.2 mCi of Tc  99m- MAA, the same multiple projections of the chest are  obtained. There is gastric inhalation of tracer activity on  ventilation imaging. There is good ventilation and perfusion  noted bilaterally. There is no area of perfusion that is worse  than ventilation.      Impression:       Low probability for pulmonary embolus.    Electronically signed by:  Anthony Dexter  10/14/2017 11:52 PM  CDT Workstation: [id number]     Venous Doppler Lower Extremity Bilateral (duplex) [[id number]] Collected:  10/15/17 0910     Updated:  10/15/17 0950    Narrative:       Doppler duplex venous examination bilateral lower extremity.       CLINICAL INDICATION: Elevated d-dimer, near syncope.       COMPARISON: Lung scan.    FINDINGS:    The common femoral,  femoral, and popliteal veins of the  bilateral     lower extremity are well identified and compress  normally.  Doppler signals are heard either  spontaneously or on  distal compression.  No evidence of intraluminal thrombus was  noted.     The visualized posterior calf veins are normal.      Impression:       CONCLUSION:  No evidence of deep venous thrombosis in the common  femoral, superficial femoral veins,, or popliteal veins of the  bilateral lower extremities.       Electronically signed by:  Naveed Taylor MD  10/15/2017 9:49 AM CDT  Workstation: [id number]          I have reviewed the patient current medications.     Assessment/Plan     Hospital Problem List     Hypoglycemia          Plan:    1.  Acute kidney injury/ dehydration:  Creatinine 1.43 on admission.  1.16 after IVF.   2.  CHF/ preserved EF:  Discontinue IVF.  Continue Lasix and Toprol XL.  Will restart HCTZ after recheck of creatinine in AM.    3.  Diabetes mellitis, type II:  The patient was admitted with hypoglycemia with a blood sugar of 55.  Even with the thiamine scale insulin protocol, the patient's blood sugar has risen to the upper 300s today.  We'll restart her home medications of insulin aspart 40 units per meal and 100 units at bedtime of insulin degludec.        Discharge Planning: I expect patient to be discharged to home in 1-2 days.      This document has been electronically signed by BEBE Mosher on October 15, 2017 5:21 PM

## 2017-10-16 VITALS
WEIGHT: 250 LBS | RESPIRATION RATE: 18 BRPM | TEMPERATURE: 97.8 F | SYSTOLIC BLOOD PRESSURE: 137 MMHG | DIASTOLIC BLOOD PRESSURE: 66 MMHG | BODY MASS INDEX: 37.89 KG/M2 | OXYGEN SATURATION: 97 % | HEART RATE: 68 BPM | HEIGHT: 68 IN

## 2017-10-16 LAB
ALBUMIN SERPL-MCNC: 3.4 G/DL (ref 3.4–4.8)
ALBUMIN/GLOB SERPL: 1.4 G/DL (ref 1.1–1.8)
ALP SERPL-CCNC: 87 U/L (ref 38–126)
ALT SERPL W P-5'-P-CCNC: 24 U/L (ref 9–52)
ANION GAP SERPL CALCULATED.3IONS-SCNC: 10 MMOL/L (ref 5–15)
AST SERPL-CCNC: 17 U/L (ref 14–36)
BACTERIA SPEC AEROBE CULT: ABNORMAL
BACTERIA SPEC AEROBE CULT: ABNORMAL
BASOPHILS # BLD AUTO: 0.04 10*3/MM3 (ref 0–0.2)
BASOPHILS NFR BLD AUTO: 0.4 % (ref 0–2)
BILIRUB SERPL-MCNC: 0.5 MG/DL (ref 0.2–1.3)
BUN BLD-MCNC: 17 MG/DL (ref 7–21)
BUN/CREAT SERPL: 16 (ref 7–25)
CALCIUM SPEC-SCNC: 8.7 MG/DL (ref 8.4–10.2)
CHLORIDE SERPL-SCNC: 104 MMOL/L (ref 95–110)
CO2 SERPL-SCNC: 27 MMOL/L (ref 22–31)
CREAT BLD-MCNC: 1.06 MG/DL (ref 0.5–1)
DEPRECATED RDW RBC AUTO: 46.7 FL (ref 36.4–46.3)
EOSINOPHIL # BLD AUTO: 0.31 10*3/MM3 (ref 0–0.7)
EOSINOPHIL NFR BLD AUTO: 2.7 % (ref 0–7)
ERYTHROCYTE [DISTWIDTH] IN BLOOD BY AUTOMATED COUNT: 14.4 % (ref 11.5–14.5)
GFR SERPL CREATININE-BSD FRML MDRD: 54 ML/MIN/1.73 (ref 51–120)
GLOBULIN UR ELPH-MCNC: 2.5 GM/DL (ref 2.3–3.5)
GLUCOSE BLD-MCNC: 233 MG/DL (ref 60–100)
GLUCOSE BLDC GLUCOMTR-MCNC: 195 MG/DL (ref 70–130)
GLUCOSE BLDC GLUCOMTR-MCNC: 289 MG/DL (ref 70–130)
GLUCOSE BLDC GLUCOMTR-MCNC: 315 MG/DL (ref 70–130)
HCT VFR BLD AUTO: 34.8 % (ref 35–45)
HGB BLD-MCNC: 11.3 G/DL (ref 12–15.5)
IMM GRANULOCYTES # BLD: 0.03 10*3/MM3 (ref 0–0.02)
IMM GRANULOCYTES NFR BLD: 0.3 % (ref 0–0.5)
LYMPHOCYTES # BLD AUTO: 2.7 10*3/MM3 (ref 0.6–4.2)
LYMPHOCYTES NFR BLD AUTO: 23.8 % (ref 10–50)
MCH RBC QN AUTO: 29 PG (ref 26.5–34)
MCHC RBC AUTO-ENTMCNC: 32.5 G/DL (ref 31.4–36)
MCV RBC AUTO: 89.5 FL (ref 80–98)
MONOCYTES # BLD AUTO: 0.82 10*3/MM3 (ref 0–0.9)
MONOCYTES NFR BLD AUTO: 7.2 % (ref 0–12)
NEUTROPHILS # BLD AUTO: 7.43 10*3/MM3 (ref 2–8.6)
NEUTROPHILS NFR BLD AUTO: 65.6 % (ref 37–80)
NRBC BLD MANUAL-RTO: 0 /100 WBC (ref 0–0)
PLATELET # BLD AUTO: 349 10*3/MM3 (ref 150–450)
PMV BLD AUTO: 9.7 FL (ref 8–12)
POTASSIUM BLD-SCNC: 4.4 MMOL/L (ref 3.5–5.1)
PROT SERPL-MCNC: 5.9 G/DL (ref 6.3–8.6)
RBC # BLD AUTO: 3.89 10*6/MM3 (ref 3.77–5.16)
SODIUM BLD-SCNC: 141 MMOL/L (ref 137–145)
WBC NRBC COR # BLD: 11.33 10*3/MM3 (ref 3.2–9.8)

## 2017-10-16 PROCEDURE — G0378 HOSPITAL OBSERVATION PER HR: HCPCS

## 2017-10-16 PROCEDURE — 85025 COMPLETE CBC W/AUTO DIFF WBC: CPT | Performed by: NURSE PRACTITIONER

## 2017-10-16 PROCEDURE — 63710000001 INSULIN ASPART PER 5 UNITS: Performed by: NURSE PRACTITIONER

## 2017-10-16 PROCEDURE — 25010000002 KETOROLAC TROMETHAMINE PER 15 MG: Performed by: INTERNAL MEDICINE

## 2017-10-16 PROCEDURE — 82962 GLUCOSE BLOOD TEST: CPT

## 2017-10-16 PROCEDURE — 25010000002 ONDANSETRON PER 1 MG: Performed by: INTERNAL MEDICINE

## 2017-10-16 PROCEDURE — 96376 TX/PRO/DX INJ SAME DRUG ADON: CPT

## 2017-10-16 PROCEDURE — 80053 COMPREHEN METABOLIC PANEL: CPT | Performed by: NURSE PRACTITIONER

## 2017-10-16 RX ORDER — LEVOFLOXACIN 750 MG/1
750 TABLET ORAL DAILY
Qty: 5 TABLET | Refills: 0 | Status: SHIPPED | OUTPATIENT
Start: 2017-10-16 | End: 2017-11-08

## 2017-10-16 RX ADMIN — PANTOPRAZOLE SODIUM 40 MG: 40 TABLET, DELAYED RELEASE ORAL at 05:20

## 2017-10-16 RX ADMIN — ONDANSETRON 4 MG: 2 INJECTION INTRAMUSCULAR; INTRAVENOUS at 05:25

## 2017-10-16 RX ADMIN — FUROSEMIDE 40 MG: 40 TABLET ORAL at 08:59

## 2017-10-16 RX ADMIN — CLOPIDOGREL BISULFATE 75 MG: 75 TABLET ORAL at 08:56

## 2017-10-16 RX ADMIN — KETOROLAC TROMETHAMINE 15 MG: 15 INJECTION, SOLUTION INTRAMUSCULAR; INTRAVENOUS at 11:34

## 2017-10-16 RX ADMIN — KETOROLAC TROMETHAMINE 15 MG: 15 INJECTION, SOLUTION INTRAMUSCULAR; INTRAVENOUS at 05:20

## 2017-10-16 RX ADMIN — INSULIN ASPART 40 UNITS: 100 INJECTION, SOLUTION INTRAVENOUS; SUBCUTANEOUS at 11:40

## 2017-10-16 RX ADMIN — RANOLAZINE 1000 MG: 500 TABLET, FILM COATED, EXTENDED RELEASE ORAL at 09:00

## 2017-10-16 RX ADMIN — INSULIN ASPART 40 UNITS: 100 INJECTION, SOLUTION INTRAVENOUS; SUBCUTANEOUS at 09:06

## 2017-10-16 RX ADMIN — ARIPIPRAZOLE 15 MG: 15 TABLET ORAL at 08:57

## 2017-10-16 RX ADMIN — METOPROLOL SUCCINATE 25 MG: 25 TABLET, EXTENDED RELEASE ORAL at 09:00

## 2017-10-16 RX ADMIN — ONDANSETRON 4 MG: 2 INJECTION INTRAMUSCULAR; INTRAVENOUS at 11:34

## 2017-10-16 RX ADMIN — VENLAFAXINE HYDROCHLORIDE 150 MG: 75 CAPSULE, EXTENDED RELEASE ORAL at 09:01

## 2017-10-16 RX ADMIN — ASPIRIN 81 MG 81 MG: 81 TABLET ORAL at 08:59

## 2017-10-16 NOTE — DISCHARGE SUMMARY
[facility] Medicine Services  DISCHARGE SUMMARY       Date of Admission: 10/14/2017  Date of Discharge:  10/16/2017  Primary Care Physician: Francisca Fong MD    Presenting Problem/History of Present Illness:  Weakness [R53.1]  Hypoglycemia [E16.2]  UTI (urinary tract infection), bacterial [N39.0, A49.9]  Chronic kidney disease, unspecified CKD stage [N18.9]       Final Discharge Diagnoses:  Hospital Problem List     Hypoglycemia          Consults:   Consults     No orders found from 9/15/2017 to 10/15/2017.          Pertinent Test Results:   Lab Results (last 24 hours)     Procedure Component Value Units Date/Time    CBC (No Diff) [[id number]]  (Abnormal) Collected:  10/15/17 1431    Specimen:  Blood Updated:  10/15/17 1447     WBC 7.20 10*3/mm3      RBC 4.01 10*6/mm3      Hemoglobin 11.7 (L) g/dL      Hematocrit 35.6 %      MCV 88.8 fL      MCH 29.2 pg      MCHC 32.9 g/dL      RDW 14.2 %      RDW-SD 46.4 (H) fl      MPV 9.7 fL      Platelets 391 10*3/mm3     Comprehensive Metabolic Panel [[id number]]  (Abnormal) Collected:  10/15/17 1431    Specimen:  Blood Updated:  10/15/17 1518     Glucose 339 (H) mg/dL      BUN 20 mg/dL      Creatinine 1.16 (H) mg/dL      Sodium 139 mmol/L      Potassium 4.7 mmol/L      Chloride 102 mmol/L      CO2 25.0 mmol/L      Calcium 8.7 mg/dL      Total Protein 5.9 (L) g/dL      Albumin 3.40 g/dL      ALT (SGPT) 22 U/L      AST (SGOT) 13 (L) U/L      Alkaline Phosphatase 97 U/L      Total Bilirubin 0.5 mg/dL      eGFR Non African Amer 49 (L) mL/min/1.73      Globulin 2.5 gm/dL      A/G Ratio 1.4 g/dL      BUN/Creatinine Ratio 17.2     Anion Gap 12.0 mmol/L     POC Glucose Fingerstick [[id number]]  (Abnormal) Collected:  10/15/17 1643    Specimen:  Blood Updated:  10/15/17 1705     Glucose 284 (H) mg/dL       RN NotifiedMeter: [id number]: [id number] PETE TELLEZ       Blood Culture - Blood, [[id number]]  (Normal) Collected:   10/14/17 2043    Specimen:  Blood from Arm, Left Updated:  10/15/17 2101     Blood Culture No growth at 24 hours    Blood Culture - Blood, [[id number]]  (Normal) Collected:  10/14/17 2025    Specimen:  Blood from Hand, Right Updated:  10/15/17 2101     Blood Culture No growth at 24 hours    POC Glucose Fingerstick [[id number]]  (Abnormal) Collected:  10/15/17 1948    Specimen:  Blood Updated:  10/16/17 0549     Glucose 315 (H) mg/dL       RN NotifiedMeter: [id number]: [id number] YORDAN ELLIS       Urine Culture - Urine, Urine, Clean Catch [[id number]]  (Abnormal)  (Susceptibility) Collected:  10/14/17 1934    Specimen:  Urine from Urine, Clean Catch Updated:  10/16/17 0648     Urine Culture --      >100,000 CFU/mL Escherichia coli (A)    Susceptibility      Escherichia coli     SHITAL     Amikacin <=16 ug/ml Susceptible     Ampicillin >16 ug/ml Resistant     Ampicillin + Sulbactam <=8/4 ug/ml Susceptible     Cefazolin <=8 ug/ml Susceptible     Ceftazidime <=1 ug/ml Susceptible     Ceftriaxone <=8 ug/ml Susceptible     Cefuroxime sodium <=4 ug/ml Susceptible     Ciprofloxacin <=1 ug/ml Susceptible     Gentamicin <=4 ug/ml Susceptible     Levofloxacin <=2 ug/ml Susceptible     Nitrofurantoin <=32 ug/ml Susceptible     Piperacillin + Tazobactam <=16 ug/ml Susceptible     Tobramycin <=4 ug/ml Susceptible     Trimethoprim + Sulfamethoxazole >2/38 ug/ml Resistant                    CBC & Differential [[id number]] Collected:  10/16/17 0822    Specimen:  Blood Updated:  10/16/17 0830    Narrative:       The following orders were created for panel order CBC & Differential.  Procedure                               Abnormality         Status                     ---------                               -----------         ------                     CBC Auto Differential[[id number]]        Abnormal            Final result                 Please view results for these tests on the individual orders.    CBC Auto Differential  [[id number]]  (Abnormal) Collected:  10/16/17 0822    Specimen:  Blood Updated:  10/16/17 0830     WBC 11.33 (H) 10*3/mm3      RBC 3.89 10*6/mm3      Hemoglobin 11.3 (L) g/dL      Hematocrit 34.8 (L) %      MCV 89.5 fL      MCH 29.0 pg      MCHC 32.5 g/dL      RDW 14.4 %      RDW-SD 46.7 (H) fl      MPV 9.7 fL      Platelets 349 10*3/mm3      Neutrophil % 65.6 %      Lymphocyte % 23.8 %      Monocyte % 7.2 %      Eosinophil % 2.7 %      Basophil % 0.4 %      Immature Grans % 0.3 %      Neutrophils, Absolute 7.43 10*3/mm3      Lymphocytes, Absolute 2.70 10*3/mm3      Monocytes, Absolute 0.82 10*3/mm3      Eosinophils, Absolute 0.31 10*3/mm3      Basophils, Absolute 0.04 10*3/mm3      Immature Grans, Absolute 0.03 (H) 10*3/mm3      nRBC 0.0 /100 WBC     POC Glucose Fingerstick [[id number]]  (Abnormal) Collected:  10/16/17 0752    Specimen:  Blood Updated:  10/16/17 0834     Glucose 195 (H) mg/dL       RN NotifiedMeter: [id number] Notified       Comprehensive Metabolic Panel [[id number]]  (Abnormal) Collected:  10/16/17 0822    Specimen:  Blood Updated:  10/16/17 0843     Glucose 233 (H) mg/dL      BUN 17 mg/dL      Creatinine 1.06 (H) mg/dL      Sodium 141 mmol/L      Potassium 4.4 mmol/L      Chloride 104 mmol/L      CO2 27.0 mmol/L      Calcium 8.7 mg/dL      Total Protein 5.9 (L) g/dL      Albumin 3.40 g/dL      ALT (SGPT) 24 U/L      AST (SGOT) 17 U/L      Alkaline Phosphatase 87 U/L      Total Bilirubin 0.5 mg/dL      eGFR Non African Amer 54 mL/min/1.73      Globulin 2.5 gm/dL      A/G Ratio 1.4 g/dL      BUN/Creatinine Ratio 16.0     Anion Gap 10.0 mmol/L     POC Glucose Fingerstick [[id number]]  (Abnormal) Collected:  10/16/17 1037    Specimen:  Blood Updated:  10/16/17 1154     Glucose 289 (H) mg/dL       RN NotifiedMeter: [id number] Notified           Imaging Results (last 24 hours)     ** No results found for the last 24 hours. **        Chief Complaint on Day of Discharge: No complaints    Hospital Course:  " This is a 56 y/o lady that was admitted 10/14/2017 with complaints of weakness.  She has had chronic leg weakness and is scheduled to see Dr. Mansfield in two weeks concerning this.  Her blood glucose was 55 on admission.  She was found to have an e-coli urinary tract infection.  A prescription is given for 7 days of Levaquin.         Condition on Discharge:  Stable     Physical Exam on Discharge:  /66 (BP Location: Right arm, Patient Position: Sitting)  Pulse 68  Temp 97.8 °F (36.6 °C) (Oral)   Resp 18  Ht 68\" (172.7 cm)  Wt 250 lb (113 kg)  SpO2 97%  BMI 38.01 kg/m2  Physical Exam   Constitutional: She is oriented to person, place, and time. She appears well-developed and well-nourished.   HENT:   Head: Normocephalic and atraumatic.   Eyes: Pupils are equal, round, and reactive to light.   Neck: Normal range of motion. Neck supple.   Cardiovascular: Normal rate and regular rhythm.    Pulmonary/Chest: Effort normal and breath sounds normal.   Abdominal: Soft. Bowel sounds are normal.   Musculoskeletal: Normal range of motion.   Neurological: She is alert and oriented to person, place, and time.   Skin: Skin is warm and dry.   Psychiatric: She has a normal mood and affect.     Discharge Disposition:  Home or Self Care    Discharge Medications:   Ariana Martinez   Home Medication Instructions ZOILA:[id number]    Printed on:10/16/17 5608   Medication Information                      ARIPiprazole (ABILIFY) 15 MG tablet  TAKE 1 TABLET BY MOUTH DAILY.             aspirin 81 MG chewable tablet  Chew 81 mg daily.             atorvastatin (LIPITOR) 40 MG tablet  Take 1 tablet by mouth Every Night.             B-D ULTRAFINE III SHORT PEN 31G X 8 MM misc  USE AS DIRECTED 4 TIMES DAILY             Calcium Citrate-Vitamin D (CITRACAL/VITAMIN D) 250-200 MG-UNIT tablet  Take 2 tablets by mouth 2 (two) times a day.             clopidogrel (PLAVIX) 75 MG tablet  TAKE 1 TABLET BY MOUTH DAILY.             fluticasone " (FLONASE) 50 MCG/ACT nasal spray  ADMINISTER 2 SPRAYS INTO EACH NOSTRIL DAILY FOR 30 DAYS.             furosemide (LASIX) 40 MG tablet  Take 1 tablet by mouth Daily.             gabapentin (NEURONTIN) 600 MG tablet  Take 1 tablet by mouth 3 (Three) Times a Day.             GLUCAGON EMERGENCY 1 MG injection  USE AS DIRECTED AS NEEDED             hydrochlorothiazide (MICROZIDE) 12.5 MG capsule  TAKE 1 CAPSULE BY MOUTH DAILY.             HYDROcodone-acetaminophen (NORCO) 7.5-325 MG per tablet  Take 1 tablet by mouth Every 4 (Four) Hours As Needed for Moderate Pain .             insulin aspart (novoLOG FLEXPEN) 100 UNIT/ML solution pen-injector sc pen  Inject 40 units with meals             Insulin Degludec 200 UNIT/ML solution pen-injector  Inject 100 Units under the skin Daily.             levoFLOXacin (LEVAQUIN) 750 MG tablet  Take 1 tablet by mouth Daily.             LORazepam (ATIVAN) 1 MG tablet  Take 1 tablet by mouth 2 (Two) Times a Day As Needed for Anxiety.             metoprolol succinate XL (TOPROL-XL) 25 MG 24 hr tablet  Take 1 tablet by mouth Daily.             mirtazapine (REMERON) 45 MG tablet  TAKE 1 TABLET BY MOUTH EVERY DAY AT BEDTIME             nabumetone (RELAFEN) 500 MG tablet  TAKE 1 TABLET BY MOUTH 2 (TWO) TIMES A DAY AS NEEDED FOR MILD PAIN (1-3).             ondansetron (ZOFRAN) 8 MG tablet  Take 1 tablet by mouth every 8 (eight) hours.             pantoprazole (PROTONIX) 40 MG EC tablet  TAKE 1 TABLET BY MOUTH DAILY.             ranolazine (RANEXA) 500 MG 12 hr tablet  Take 2 tablets by mouth 2 (Two) Times a Day.             venlafaxine XR (EFFEXOR-XR) 150 MG 24 hr capsule  TAKE ONE CAPSULE BY MOUTH TWICE A DAY FOR DEPRESSION             vitamin D (ERGOCALCIFEROL) 34296 UNITS capsule capsule  TAKE ONE CAPSULE BY MOUTH EVERY SUNDAY                 Discharge Diet:   Diet Instructions     Diet: Consistent Carbohydrate       Discharge Diet:  Consistent Carbohydrate                 Activity at  Discharge:   Activity Instructions     Activity as Tolerated                     Discharge Care Plan/Instructions: Prescription is given for Levaquin.  The patient will follow up with her PCP in 2-3 days.      Follow-up Appointments:   Future Appointments  Date Time Provider Department Center   10/23/2017 8:15 AM Francisca Fong MD [facility] None   10/25/2017 3:30 PM BEBE Prince [facility] None   10/31/2017 11:00 AM Can Kwon MD PhD [facility] None   11/8/2017 11:00 AM Francisca Fong MD [facility] None   3/16/2018 1:00 PM Can Kwon MD PhD [facility] None       Test Results Pending at Discharge:  Order Current Status    Blood Culture - Blood, Preliminary result    Blood Culture - Blood, Preliminary result            This document has been electronically signed by BEBE Mosher on October 16, 2017 3:40 PM        Time: Greater than 30 minutes.

## 2017-10-16 NOTE — PLAN OF CARE
Problem: Patient Care Overview (Adult)  Goal: Plan of Care Review  Outcome: Ongoing (interventions implemented as appropriate)    10/16/17 0238   Coping/Psychosocial Response Interventions   Plan Of Care Reviewed With patient   Patient Care Overview   Progress improving   Outcome Evaluation   Outcome Summary/Follow up Plan Pt rested well overnight, no new events vital signs stable pt took insulin from home will continue to monitorl       Goal: Adult Individualization and Mutuality  Outcome: Ongoing (interventions implemented as appropriate)  Goal: Discharge Needs Assessment  Outcome: Ongoing (interventions implemented as appropriate)    Problem: Fall Risk (Adult)  Goal: Identify Related Risk Factors and Signs and Symptoms  Outcome: Ongoing (interventions implemented as appropriate)  Goal: Absence of Falls  Outcome: Ongoing (interventions implemented as appropriate)

## 2017-10-16 NOTE — PLAN OF CARE
Problem: Patient Care Overview (Adult)  Goal: Adult Individualization and Mutuality  Outcome: Ongoing (interventions implemented as appropriate)  Goal: Discharge Needs Assessment  Outcome: Ongoing (interventions implemented as appropriate)    Problem: Diabetes, Type 2 (Adult)  Goal: Signs and Symptoms of Listed Potential Problems Will be Absent or Manageable (Diabetes, Type 2)  Outcome: Ongoing (interventions implemented as appropriate)    Problem: Fall Risk (Adult)  Goal: Identify Related Risk Factors and Signs and Symptoms  Outcome: Ongoing (interventions implemented as appropriate)  Goal: Absence of Falls  Outcome: Ongoing (interventions implemented as appropriate)

## 2017-10-17 ENCOUNTER — CLINICAL SUPPORT (OUTPATIENT)
Dept: FAMILY MEDICINE CLINIC | Facility: CLINIC | Age: 57
End: 2017-10-17

## 2017-10-17 DIAGNOSIS — E53.8 LOW VITAMIN B12 LEVEL: ICD-10-CM

## 2017-10-17 LAB — GLUCOSE BLDC GLUCOMTR-MCNC: 189 MG/DL (ref 70–130)

## 2017-10-17 PROCEDURE — 96372 THER/PROPH/DIAG INJ SC/IM: CPT | Performed by: FAMILY MEDICINE

## 2017-10-17 RX ADMIN — CYANOCOBALAMIN 1000 MCG: 1000 INJECTION, SOLUTION INTRAMUSCULAR; SUBCUTANEOUS at 10:47

## 2017-10-18 ENCOUNTER — APPOINTMENT (OUTPATIENT)
Dept: LAB | Facility: HOSPITAL | Age: 57
End: 2017-10-18

## 2017-10-18 ENCOUNTER — TRANSCRIBE ORDERS (OUTPATIENT)
Dept: LAB | Facility: HOSPITAL | Age: 57
End: 2017-10-18

## 2017-10-18 DIAGNOSIS — E55.9 VITAMIN D DEFICIENCY: ICD-10-CM

## 2017-10-18 DIAGNOSIS — E61.1 IRON DEFICIENCY: ICD-10-CM

## 2017-10-18 DIAGNOSIS — E11.9 DIABETES MELLITUS WITHOUT COMPLICATION (HCC): ICD-10-CM

## 2017-10-18 DIAGNOSIS — D51.9 ANEMIA DUE TO VITAMIN B12 DEFICIENCY, UNSPECIFIED B12 DEFICIENCY TYPE: ICD-10-CM

## 2017-10-18 DIAGNOSIS — E03.9 HYPOTHYROIDISM, UNSPECIFIED TYPE: ICD-10-CM

## 2017-10-18 DIAGNOSIS — Z13.1 ENCOUNTER FOR SCREENING FOR DIABETES MELLITUS: ICD-10-CM

## 2017-10-18 DIAGNOSIS — G60.8 OTHER HEREDITARY AND IDIOPATHIC NEUROPATHIES: Primary | ICD-10-CM

## 2017-10-19 ENCOUNTER — LAB (OUTPATIENT)
Dept: LAB | Facility: HOSPITAL | Age: 57
End: 2017-10-19

## 2017-10-19 DIAGNOSIS — E11.9 DIABETES MELLITUS WITHOUT COMPLICATION (HCC): ICD-10-CM

## 2017-10-19 DIAGNOSIS — G60.8 OTHER HEREDITARY AND IDIOPATHIC NEUROPATHIES: ICD-10-CM

## 2017-10-19 DIAGNOSIS — E55.9 VITAMIN D DEFICIENCY: ICD-10-CM

## 2017-10-19 DIAGNOSIS — Z13.1 ENCOUNTER FOR SCREENING FOR DIABETES MELLITUS: ICD-10-CM

## 2017-10-19 DIAGNOSIS — E03.9 HYPOTHYROIDISM, UNSPECIFIED TYPE: ICD-10-CM

## 2017-10-19 DIAGNOSIS — D51.9 ANEMIA DUE TO VITAMIN B12 DEFICIENCY, UNSPECIFIED B12 DEFICIENCY TYPE: ICD-10-CM

## 2017-10-19 DIAGNOSIS — E61.1 IRON DEFICIENCY: ICD-10-CM

## 2017-10-19 LAB
25(OH)D3 SERPL-MCNC: 16.4 NG/ML (ref 30–100)
ALBUMIN SERPL-MCNC: 3.7 G/DL (ref 3.4–4.8)
ALBUMIN/GLOB SERPL: 1.2 G/DL (ref 1.1–1.8)
ALP SERPL-CCNC: 92 U/L (ref 38–126)
ALT SERPL W P-5'-P-CCNC: 28 U/L (ref 9–52)
ANION GAP SERPL CALCULATED.3IONS-SCNC: 12 MMOL/L (ref 5–15)
AST SERPL-CCNC: 44 U/L (ref 14–36)
BACTERIA SPEC AEROBE CULT: NORMAL
BACTERIA SPEC AEROBE CULT: NORMAL
BASOPHILS # BLD AUTO: 0.03 10*3/MM3 (ref 0–0.2)
BASOPHILS NFR BLD AUTO: 0.3 % (ref 0–2)
BILIRUB CONJ SERPL-MCNC: 0 MG/DL (ref 0–0.3)
BILIRUB SERPL-MCNC: 0.4 MG/DL (ref 0.2–1.3)
BUN BLD-MCNC: 15 MG/DL (ref 7–21)
BUN/CREAT SERPL: 12.8 (ref 7–25)
CALCIUM SPEC-SCNC: 9.2 MG/DL (ref 8.4–10.2)
CHLORIDE SERPL-SCNC: 101 MMOL/L (ref 95–110)
CO2 SERPL-SCNC: 31 MMOL/L (ref 22–31)
CREAT BLD-MCNC: 1.17 MG/DL (ref 0.5–1)
DEPRECATED RDW RBC AUTO: 47.1 FL (ref 36.4–46.3)
EOSINOPHIL # BLD AUTO: 0.31 10*3/MM3 (ref 0–0.7)
EOSINOPHIL NFR BLD AUTO: 2.8 % (ref 0–7)
ERYTHROCYTE [DISTWIDTH] IN BLOOD BY AUTOMATED COUNT: 14.4 % (ref 11.5–14.5)
FOLATE SERPL-MCNC: 7.43 NG/ML (ref 2.76–21)
GFR SERPL CREATININE-BSD FRML MDRD: 48 ML/MIN/1.73 (ref 51–120)
GLOBULIN UR ELPH-MCNC: 3.1 GM/DL (ref 2.3–3.5)
GLUCOSE BLD-MCNC: 134 MG/DL (ref 60–100)
HBA1C MFR BLD: 7.4 % (ref 4–5.6)
HCT VFR BLD AUTO: 37.6 % (ref 35–45)
HGB BLD-MCNC: 12.3 G/DL (ref 12–15.5)
IMM GRANULOCYTES # BLD: 0.03 10*3/MM3 (ref 0–0.02)
IMM GRANULOCYTES NFR BLD: 0.3 % (ref 0–0.5)
IRON 24H UR-MRATE: 20 MCG/DL (ref 37–170)
LYMPHOCYTES # BLD AUTO: 2.46 10*3/MM3 (ref 0.6–4.2)
LYMPHOCYTES NFR BLD AUTO: 22.6 % (ref 10–50)
MAGNESIUM SERPL-MCNC: 1.8 MG/DL (ref 1.6–2.3)
MCH RBC QN AUTO: 29.2 PG (ref 26.5–34)
MCHC RBC AUTO-ENTMCNC: 32.7 G/DL (ref 31.4–36)
MCV RBC AUTO: 89.3 FL (ref 80–98)
MONOCYTES # BLD AUTO: 0.81 10*3/MM3 (ref 0–0.9)
MONOCYTES NFR BLD AUTO: 7.4 % (ref 0–12)
NEUTROPHILS # BLD AUTO: 7.26 10*3/MM3 (ref 2–8.6)
NEUTROPHILS NFR BLD AUTO: 66.6 % (ref 37–80)
PLATELET # BLD AUTO: 434 10*3/MM3 (ref 150–450)
PMV BLD AUTO: 9.7 FL (ref 8–12)
POTASSIUM BLD-SCNC: 4 MMOL/L (ref 3.5–5.1)
PROT SERPL-MCNC: 6.8 G/DL (ref 6.3–8.6)
RBC # BLD AUTO: 4.21 10*6/MM3 (ref 3.77–5.16)
SODIUM BLD-SCNC: 144 MMOL/L (ref 137–145)
TSH SERPL DL<=0.05 MIU/L-ACNC: 5.51 MIU/ML (ref 0.46–4.68)
VIT B12 BLD-MCNC: 898 PG/ML (ref 239–931)
WBC NRBC COR # BLD: 10.9 10*3/MM3 (ref 3.2–9.8)

## 2017-10-19 PROCEDURE — 84425 ASSAY OF VITAMIN B-1: CPT | Performed by: PSYCHIATRY & NEUROLOGY

## 2017-10-19 PROCEDURE — 82607 VITAMIN B-12: CPT | Performed by: NURSE PRACTITIONER

## 2017-10-19 PROCEDURE — 82248 BILIRUBIN DIRECT: CPT | Performed by: NURSE PRACTITIONER

## 2017-10-19 PROCEDURE — 82525 ASSAY OF COPPER: CPT | Performed by: PSYCHIATRY & NEUROLOGY

## 2017-10-19 PROCEDURE — 82746 ASSAY OF FOLIC ACID SERUM: CPT | Performed by: NURSE PRACTITIONER

## 2017-10-19 PROCEDURE — 82306 VITAMIN D 25 HYDROXY: CPT | Performed by: NURSE PRACTITIONER

## 2017-10-19 PROCEDURE — 83735 ASSAY OF MAGNESIUM: CPT | Performed by: NURSE PRACTITIONER

## 2017-10-19 PROCEDURE — 83540 ASSAY OF IRON: CPT | Performed by: NURSE PRACTITIONER

## 2017-10-19 PROCEDURE — 84443 ASSAY THYROID STIM HORMONE: CPT | Performed by: NURSE PRACTITIONER

## 2017-10-19 PROCEDURE — 85025 COMPLETE CBC W/AUTO DIFF WBC: CPT | Performed by: NURSE PRACTITIONER

## 2017-10-19 PROCEDURE — 80053 COMPREHEN METABOLIC PANEL: CPT | Performed by: NURSE PRACTITIONER

## 2017-10-19 PROCEDURE — 36415 COLL VENOUS BLD VENIPUNCTURE: CPT

## 2017-10-19 PROCEDURE — 83036 HEMOGLOBIN GLYCOSYLATED A1C: CPT | Performed by: NURSE PRACTITIONER

## 2017-10-20 RX ORDER — VENLAFAXINE HYDROCHLORIDE 150 MG/1
CAPSULE, EXTENDED RELEASE ORAL
Qty: 180 CAPSULE | Refills: 3 | Status: SHIPPED | OUTPATIENT
Start: 2017-10-20 | End: 2018-10-03 | Stop reason: SDUPTHER

## 2017-10-21 LAB — COPPER SERPL-MCNC: 120 UG/DL (ref 72–166)

## 2017-10-23 ENCOUNTER — OFFICE VISIT (OUTPATIENT)
Dept: FAMILY MEDICINE CLINIC | Facility: CLINIC | Age: 57
End: 2017-10-23

## 2017-10-23 VITALS
BODY MASS INDEX: 40.47 KG/M2 | TEMPERATURE: 97.4 F | HEIGHT: 68 IN | OXYGEN SATURATION: 92 % | WEIGHT: 267 LBS | HEART RATE: 83 BPM | SYSTOLIC BLOOD PRESSURE: 128 MMHG | DIASTOLIC BLOOD PRESSURE: 72 MMHG

## 2017-10-23 DIAGNOSIS — IMO0001 CLASS 3 OBESITY DUE TO EXCESS CALORIES WITH SERIOUS COMORBIDITY AND BODY MASS INDEX (BMI) OF 40.0 TO 44.9 IN ADULT: ICD-10-CM

## 2017-10-23 DIAGNOSIS — R30.0 DYSURIA: Primary | ICD-10-CM

## 2017-10-23 DIAGNOSIS — E16.0 HYPOGLYCEMIA DUE TO INSULIN: ICD-10-CM

## 2017-10-23 DIAGNOSIS — T38.3X5A HYPOGLYCEMIA DUE TO INSULIN: ICD-10-CM

## 2017-10-23 DIAGNOSIS — IMO0002 UNCONTROLLED TYPE 2 DIABETES MELLITUS WITH COMPLICATION, WITH LONG-TERM CURRENT USE OF INSULIN: ICD-10-CM

## 2017-10-23 DIAGNOSIS — R30.0 DYSURIA: ICD-10-CM

## 2017-10-23 DIAGNOSIS — Z09 HOSPITAL DISCHARGE FOLLOW-UP: Primary | ICD-10-CM

## 2017-10-23 PROBLEM — R19.4 CHANGE IN BOWEL HABITS: Status: RESOLVED | Noted: 2017-09-12 | Resolved: 2017-10-23

## 2017-10-23 PROBLEM — E66.01 MORBID OBESITY DUE TO EXCESS CALORIES: Status: RESOLVED | Noted: 2017-08-08 | Resolved: 2017-10-23

## 2017-10-23 PROBLEM — E66.9 OBESITY (BMI 30-39.9): Status: RESOLVED | Noted: 2017-05-08 | Resolved: 2017-10-23

## 2017-10-23 PROBLEM — R11.2 NAUSEA AND VOMITING: Status: RESOLVED | Noted: 2017-09-12 | Resolved: 2017-10-23

## 2017-10-23 PROBLEM — E16.2 HYPOGLYCEMIA: Status: RESOLVED | Noted: 2017-10-14 | Resolved: 2017-10-23

## 2017-10-23 LAB
BILIRUB BLD-MCNC: ABNORMAL MG/DL
CLARITY, POC: CLEAR
COLOR UR: YELLOW
GLUCOSE UR STRIP-MCNC: NEGATIVE MG/DL
KETONES UR QL: ABNORMAL
LEUKOCYTE EST, POC: ABNORMAL
NITRITE UR-MCNC: NEGATIVE MG/ML
PH UR: 5 [PH] (ref 5–8)
PROT UR STRIP-MCNC: ABNORMAL MG/DL
RBC # UR STRIP: NEGATIVE /UL
SP GR UR: 1.03 (ref 1–1.03)
UROBILINOGEN UR QL: NORMAL
VIT B1 BLD-SCNC: 180.5 NMOL/L (ref 66.5–200)

## 2017-10-23 PROCEDURE — 99214 OFFICE O/P EST MOD 30 MIN: CPT | Performed by: FAMILY MEDICINE

## 2017-10-23 PROCEDURE — 81002 URINALYSIS NONAUTO W/O SCOPE: CPT | Performed by: FAMILY MEDICINE

## 2017-10-23 PROCEDURE — 87086 URINE CULTURE/COLONY COUNT: CPT | Performed by: FAMILY MEDICINE

## 2017-10-23 NOTE — PROGRESS NOTES
"Subjective   Chief Complaint   Patient presents with   • hospital follow up     [employer]   • pnuemonia vac     Ariana Martinez is a 57 y.o. female.   hospital follow up ([employer]) and pnuemonia vac    History of Present Illness     Presents today for a hospital follow up from [facility]  Was admitted 10/14 and discharged 10/16  Had complaints of lower extremity weakness and shaking  Was found to have UTI and hypoglycemia  Was discharged home with antibiotics - completed course (levaquin)  Will repeat urinalysis today  They made an adjustment to her SSI to help with blood glucose levels  Diabetes is followed by endocrinology  Fasting blood glucose level average 120  Scheduled to see Dr Onofre this week  Had been seen by Dr Mansfield (neurology) for an evlauation  Scheduled for a follow up with additional testing this week    The following portions of the patient's history were reviewed and updated as appropriate: allergies, current medications, past family history, past medical history, past social history, past surgical history and problem list.    Review of Systems   Constitutional: Negative for appetite change, chills, fatigue and fever.   HENT: Negative for congestion, ear pain, rhinorrhea and sore throat.    Eyes: Negative for pain.   Respiratory: Negative for cough and shortness of breath.    Cardiovascular: Negative for chest pain and palpitations.   Gastrointestinal: Negative for abdominal pain, constipation, diarrhea and nausea.   Genitourinary: Negative for dysuria.   Musculoskeletal: Negative for back pain, joint swelling and neck pain.   Skin: Negative for rash.   Neurological: Negative for dizziness and headaches.       Objective   /72  Pulse 83  Temp 97.4 °F (36.3 °C)  Ht 68\" (172.7 cm)  Wt 267 lb (121 kg)  SpO2 92%  BMI 40.6 kg/m2  Physical Exam   Constitutional: She is oriented to person, place, and time. She appears well-developed and well-nourished.   HENT:   Head: " Normocephalic and atraumatic.   Eyes: Pupils are equal, round, and reactive to light.   Neck: Normal range of motion. Neck supple.   Cardiovascular: Normal rate, regular rhythm and normal heart sounds.    Pulmonary/Chest: Effort normal and breath sounds normal. No respiratory distress. She has no wheezes. She has no rales.   Abdominal: Soft. Bowel sounds are normal.   Musculoskeletal: Normal range of motion.   Neurological: She is alert and oriented to person, place, and time.   Skin: Skin is warm and dry.   Psychiatric: She has a normal mood and affect.   Nursing note and vitals reviewed.      Assessment/Plan   Problems Addressed this Visit        Endocrine    Type II diabetes mellitus, uncontrolled    Hypoglycemia due to insulin       Other    Class 3 obesity due to excess calories with serious comorbidity and body mass index (BMI) of 40.0 to 44.9 in adult      Other Visit Diagnoses     Hospital discharge follow-up    -  Primary    Dysuria        Relevant Orders    POCT urinalysis dipstick, manual (Completed)        Reviewed recent lab results with patient  Reviewed hospital records    Urinalysis - negative  Urine culture ordered for confirmation    Follow up scheduled with endocrinology    Script for diabetic shoes provided  Reminded patient she will be due for annual dm foot exam in December    Follow up scheduled with neurology    Flu vaccine done    Requesting pneumococcal vaccine - not needed    Recheck as scheduled

## 2017-10-24 LAB — BACTERIA SPEC AEROBE CULT: NORMAL

## 2017-10-25 ENCOUNTER — OFFICE VISIT (OUTPATIENT)
Dept: ENDOCRINOLOGY | Facility: CLINIC | Age: 57
End: 2017-10-25

## 2017-10-25 VITALS
HEIGHT: 68 IN | BODY MASS INDEX: 39.25 KG/M2 | WEIGHT: 259 LBS | DIASTOLIC BLOOD PRESSURE: 78 MMHG | HEART RATE: 97 BPM | SYSTOLIC BLOOD PRESSURE: 120 MMHG

## 2017-10-25 DIAGNOSIS — E11.42 DIABETIC PERIPHERAL NEUROPATHY ASSOCIATED WITH TYPE 2 DIABETES MELLITUS (HCC): ICD-10-CM

## 2017-10-25 DIAGNOSIS — E78.2 MIXED HYPERLIPIDEMIA: ICD-10-CM

## 2017-10-25 DIAGNOSIS — E55.9 VITAMIN D DEFICIENCY: ICD-10-CM

## 2017-10-25 DIAGNOSIS — E03.8 HYPOTHYROIDISM DUE TO HASHIMOTO'S THYROIDITIS: ICD-10-CM

## 2017-10-25 DIAGNOSIS — E06.3 HYPOTHYROIDISM DUE TO HASHIMOTO'S THYROIDITIS: ICD-10-CM

## 2017-10-25 DIAGNOSIS — IMO0002 UNCONTROLLED TYPE 2 DIABETES MELLITUS WITH COMPLICATION, WITH LONG-TERM CURRENT USE OF INSULIN: Primary | ICD-10-CM

## 2017-10-25 PROCEDURE — 99214 OFFICE O/P EST MOD 30 MIN: CPT | Performed by: NURSE PRACTITIONER

## 2017-10-25 RX ORDER — INFLUENZA VIRUS VACCINE 15; 15; 15; 15 UG/.5ML; UG/.5ML; UG/.5ML; UG/.5ML
SUSPENSION INTRAMUSCULAR
Refills: 0 | COMMUNITY
Start: 2017-10-11 | End: 2017-11-08

## 2017-10-25 NOTE — PROGRESS NOTES
Subjective    Ariana Martinez is a 57 y.o. female. she is here today for follow-up.    History of Present Illness        Duration/Timing: Diabetes mellitus type 2, Age at onset of diabetes: 26 years years, Onset of symptoms gradual  timing - constant     qualtiy - uncontrolled     severity - moderate        -----------------------     Severity (Complications/Hospitalizations)  Secondary Macrovascular Complications: No CAD, No CVA, No PAD  Secondary Microvascular Complications: No Diabetic Nephropathy, No Diabetic Retinopathy, Diabetic Neuropathy     Context  Diabetes Regimen: Insulin, Oral Medications    Lab Results   Component Value Date    HGBA1C 7.4 (H) 10/19/2017             Blood Glucose Readings     Now on dexcom sensor     High am -- missing tresiba           Diet  variable carb intake  Exercise: Exercises  walking     Associated Signs/Symptoms  Hyperglycemic Symptoms: No polyuria, No polydipsia, No polyphagia, No weight gain  Hypoglycemic Episodes: Documented symptomatic hypoglycemia, Seizures and syncope related to hypoglycemia                The following portions of the patient's history were reviewed and updated as appropriate:   Past Medical History:   Diagnosis Date   • Acquired hypothyroidism    • Angina, class IV    • Anxiety    • Benign paroxysmal positional vertigo    • Carpal tunnel syndrome    • Chronic pain    • Coronary atherosclerosis    • Depression    • Diabetes mellitus     Type 2, controlled   • Diabetic polyneuropathy    • Female stress incontinence    • Hashimoto's thyroiditis    • Hyperlipidemia    • Hypertension    • Low back pain    • Malaise and fatigue    • Multiple joint pain    • Obesity     Refuses to be weighed   • Otalgia     Both   • Perforation of tympanic membrane     Left   • Postoperative wound infection    • Vitamin D deficiency      Past Surgical History:   Procedure Laterality Date   • ABDOMINAL SURGERY     • ANGIOPLASTY      coronary   • BREAST BIOPSY Right    •  CARDIAC CATHETERIZATION     • CARDIAC CATHETERIZATION N/A 2017    Procedure: Right Heart Cath;  Surgeon: Can Kwon MD PhD;  Location: [facility] INVASIVE LOC[facility];  Service:    • CARPAL TUNNEL RELEASE     • CHOLECYSTECTOMY     • CORONARY ARTERY BYPASS GRAFT     • ENDOSCOPY AND COLONOSCOPY     • FOOT SURGERY      Toes   • GASTRIC BANDING      Revision, laparoscopic   • HYSTERECTOMY     • MOUTH SURGERY     • SALPINGO OOPHORECTOMY     • SHOULDER SURGERY     • TRANSESOPHAGEAL ECHOCARDIOGRAM (LAMONTE)      With color flow     Family History   Problem Relation Age of Onset   • Diabetes Other    • Heart disease Other    • Hypertension Other    • Heart disease Mother    • Stroke Mother    • Hypertension Mother    • Diabetes Sister    • Heart disease Sister    • Hypertension Sister    • Heart disease Sister    • Diabetes Sister    • Hypertension Sister    • Diabetes Sister    • Diabetes Sister    • Diabetes Sister    • Diabetes Sister      OB History      Para Term  AB Living    0 0 0 0 0 0    SAB TAB Ectopic Multiple Live Births    0 0 0 0         Current Outpatient Prescriptions   Medication Sig Dispense Refill   • ARIPiprazole (ABILIFY) 15 MG tablet TAKE 1 TABLET BY MOUTH DAILY. 30 tablet 5   • aspirin 81 MG chewable tablet Chew 81 mg daily.     • atorvastatin (LIPITOR) 40 MG tablet Take 1 tablet by mouth Every Night. 90 tablet 1   • B-D ULTRAFINE III SHORT PEN 31G X 8 MM misc USE AS DIRECTED 4 TIMES DAILY 150 each 11   • Calcium Citrate-Vitamin D (CITRACAL/VITAMIN D) 250-200 MG-UNIT tablet Take 2 tablets by mouth 2 (two) times a day.     • clopidogrel (PLAVIX) 75 MG tablet TAKE 1 TABLET BY MOUTH DAILY. 90 tablet 3   • FLUARIX QUADRIVALENT 0.5 ML suspension prefilled syringe injection TO BE ADMINISTERED BY PHARMACIST FOR IMMUNIZATION  0   • fluticasone (FLONASE) 50 MCG/ACT nasal spray ADMINISTER 2 SPRAYS INTO EACH NOSTRIL DAILY FOR 30 DAYS. (Patient taking differently: ADMINISTER 2 SPRAYS INTO  EACH NOSTRIL DAILY prn FOR 30 DAYS.) 16 mL 0   • furosemide (LASIX) 40 MG tablet Take 1 tablet by mouth Daily. 90 tablet 3   • gabapentin (NEURONTIN) 600 MG tablet Take 1 tablet by mouth 3 (Three) Times a Day. 90 tablet 5   • GLUCAGON EMERGENCY 1 MG injection USE AS DIRECTED AS NEEDED 1 kit 0   • hydrochlorothiazide (MICROZIDE) 12.5 MG capsule TAKE 1 CAPSULE BY MOUTH DAILY. 90 capsule 0   • HYDROcodone-acetaminophen (NORCO) 7.5-325 MG per tablet Take 1 tablet by mouth Every 4 (Four) Hours As Needed for Moderate Pain . 180 tablet 0   • insulin aspart (novoLOG FLEXPEN) 100 UNIT/ML solution pen-injector sc pen Inject 40 units with meals (Patient taking differently: Inject 40 Units under the skin 3 (Three) Times a Day With Meals. Inject 40 units with meals  ) 30 mL 5   • Insulin Degludec 200 UNIT/ML solution pen-injector Inject 100 Units under the skin Daily. (Patient taking differently: Inject 100 Units under the skin Every Night.) 6 pen 11   • levoFLOXacin (LEVAQUIN) 750 MG tablet Take 1 tablet by mouth Daily. 5 tablet 0   • LORazepam (ATIVAN) 1 MG tablet Take 1 tablet by mouth 2 (Two) Times a Day As Needed for Anxiety. 60 tablet 2   • metoprolol succinate XL (TOPROL-XL) 25 MG 24 hr tablet Take 1 tablet by mouth Daily. 31 tablet 13   • mirtazapine (REMERON) 45 MG tablet TAKE 1 TABLET BY MOUTH EVERY DAY AT BEDTIME 30 tablet 5   • nabumetone (RELAFEN) 500 MG tablet TAKE 1 TABLET BY MOUTH 2 (TWO) TIMES A DAY AS NEEDED FOR MILD PAIN (1-3). (Patient taking differently: TAKE 1 TABLET BY MOUTH 2 (TWO) TIMES A DAY FOR MILD PAIN (1-3).) 60 tablet 0   • ondansetron (ZOFRAN) 8 MG tablet Take 1 tablet by mouth every 8 (eight) hours. (Patient taking differently: Take 8 mg by mouth Every 8 (Eight) Hours As Needed.) 90 tablet 3   • pantoprazole (PROTONIX) 40 MG EC tablet TAKE 1 TABLET BY MOUTH DAILY. 90 tablet 2   • ranolazine (RANEXA) 500 MG 12 hr tablet Take 2 tablets by mouth 2 (Two) Times a Day. 120 tablet 6   • venlafaxine XR  (EFFEXOR-XR) 150 MG 24 hr capsule TAKE ONE CAPSULE BY MOUTH TWICE A DAY FOR DEPRESSION 180 capsule 3   • vitamin D (ERGOCALCIFEROL) 85793 UNITS capsule capsule TAKE ONE CAPSULE BY MOUTH EVERY SUNDAY 12 capsule 2     Current Facility-Administered Medications   Medication Dose Route Frequency Provider Last Rate Last Dose   • cyanocobalamin injection 1,000 mcg  1,000 mcg Intramuscular Q28 Days Francisca Fong MD   1,000 mcg at 10/17/17 1047     Allergies   Allergen Reactions   • Seroquel [Quetiapine Fumarate] Anaphylaxis   • Avandia [Rosiglitazone] Swelling   • Morphine And Related Hallucinations     If patient takes too much     Social History     Social History   • Marital status:      Spouse name: N/A   • Number of children: N/A   • Years of education: N/A     Social History Main Topics   • Smoking status: Never Smoker   • Smokeless tobacco: Never Used   • Alcohol use No   • Drug use: No   • Sexual activity: Defer      Comment:      Other Topics Concern   • None     Social History Narrative       Review of Systems  Review of Systems   Constitutional: Negative for activity change, appetite change, diaphoresis and fatigue.   HENT: Negative for congestion, dental problem, drooling, facial swelling, sneezing, sore throat, tinnitus, trouble swallowing and voice change.    Eyes: Negative for photophobia, pain, discharge, redness, itching and visual disturbance.   Respiratory: Negative for apnea, cough, choking, chest tightness and shortness of breath.    Cardiovascular: Negative for chest pain, palpitations and leg swelling.   Gastrointestinal: Negative for abdominal distention, abdominal pain, constipation, diarrhea, nausea and vomiting.   Endocrine: Negative for cold intolerance, heat intolerance, polydipsia, polyphagia and polyuria.   Genitourinary: Negative for difficulty urinating, dysuria, frequency, hematuria and urgency.   Musculoskeletal: Negative for arthralgias, back pain, gait problem, joint  "swelling, myalgias, neck pain and neck stiffness.   Skin: Negative for color change, pallor, rash and wound.   Allergic/Immunologic: Negative for environmental allergies, food allergies and immunocompromised state.   Neurological: Negative for dizziness, tremors, facial asymmetry, weakness, light-headedness, numbness and headaches.   Hematological: Negative for adenopathy. Does not bruise/bleed easily.   Psychiatric/Behavioral: Negative for agitation, behavioral problems, confusion and sleep disturbance.        Objective    /78 (BP Location: Right arm, Patient Position: Sitting, Cuff Size: Adult)  Pulse 97  Ht 68\" (172.7 cm)  Wt 259 lb (117 kg)  BMI 39.38 kg/m2  Physical Exam   Constitutional: She is oriented to person, place, and time. She appears well-developed and well-nourished. No distress.   HENT:   Head: Normocephalic and atraumatic.   Right Ear: External ear normal.   Left Ear: External ear normal.   Nose: Nose normal.   Eyes: Conjunctivae and EOM are normal. Pupils are equal, round, and reactive to light.   Neck: Normal range of motion. Neck supple. No tracheal deviation present. No thyromegaly present.   Cardiovascular: Normal rate, regular rhythm and normal heart sounds.    No murmur heard.  Pulmonary/Chest: Effort normal and breath sounds normal. No respiratory distress. She has no wheezes.   Abdominal: Soft. Bowel sounds are normal. There is no tenderness. There is no rebound and no guarding.   Musculoskeletal: Normal range of motion. She exhibits no edema, tenderness or deformity.   Neurological: She is alert and oriented to person, place, and time. No cranial nerve deficit.   Skin: Skin is warm and dry. No rash noted.   Psychiatric: She has a normal mood and affect. Her behavior is normal. Judgment and thought content normal.       Lab Review  Glucose (mg/dL)   Date Value   10/19/2017 134 (H)   10/16/2017 233 (H)   10/15/2017 339 (H)     Glucose, Arterial (mmol/L)   Date Value   10/14/2017 " 155     Sodium (mmol/L)   Date Value   10/19/2017 144   10/16/2017 141   10/15/2017 139     Potassium (mmol/L)   Date Value   10/19/2017 4.0   10/16/2017 4.4   10/15/2017 4.7     Chloride (mmol/L)   Date Value   10/19/2017 101   10/16/2017 104   10/15/2017 102     CO2 (mmol/L)   Date Value   10/19/2017 31.0   10/16/2017 27.0   10/15/2017 25.0     BUN (mg/dL)   Date Value   10/19/2017 15   10/16/2017 17   10/15/2017 20     Creatinine (mg/dL)   Date Value   10/19/2017 1.17 (H)   10/16/2017 1.06 (H)   10/15/2017 1.16 (H)     Hemoglobin A1C (%)   Date Value   10/19/2017 7.4 (H)   08/27/2017 7.3 (H)   07/20/2017 8.26 (H)     Triglycerides   Date Value   07/20/2017 389 mg/dL (H)   03/12/2017 290 mg/dL (H)   01/19/2017 235 mg/dl (H)   11/12/2016 290 mg/dl (H)   06/20/2016 267 mg/dl (H)       Assessment/Plan      1. Uncontrolled type 2 diabetes mellitus with complication, with long-term current use of insulin    2. Hypothyroidism due to Hashimoto's thyroiditis    3. Diabetic peripheral neuropathy associated with type 2 diabetes mellitus    4. Mixed hyperlipidemia    5. Vitamin D deficiency    .    Medications prescribed:  Outpatient Encounter Prescriptions as of 10/25/2017   Medication Sig Dispense Refill   • ARIPiprazole (ABILIFY) 15 MG tablet TAKE 1 TABLET BY MOUTH DAILY. 30 tablet 5   • aspirin 81 MG chewable tablet Chew 81 mg daily.     • atorvastatin (LIPITOR) 40 MG tablet Take 1 tablet by mouth Every Night. 90 tablet 1   • B-D ULTRAFINE III SHORT PEN 31G X 8 MM misc USE AS DIRECTED 4 TIMES DAILY 150 each 11   • Calcium Citrate-Vitamin D (CITRACAL/VITAMIN D) 250-200 MG-UNIT tablet Take 2 tablets by mouth 2 (two) times a day.     • clopidogrel (PLAVIX) 75 MG tablet TAKE 1 TABLET BY MOUTH DAILY. 90 tablet 3   • FLUARIX QUADRIVALENT 0.5 ML suspension prefilled syringe injection TO BE ADMINISTERED BY PHARMACIST FOR IMMUNIZATION  0   • fluticasone (FLONASE) 50 MCG/ACT nasal spray ADMINISTER 2 SPRAYS INTO EACH NOSTRIL DAILY  FOR 30 DAYS. (Patient taking differently: ADMINISTER 2 SPRAYS INTO EACH NOSTRIL DAILY prn FOR 30 DAYS.) 16 mL 0   • furosemide (LASIX) 40 MG tablet Take 1 tablet by mouth Daily. 90 tablet 3   • gabapentin (NEURONTIN) 600 MG tablet Take 1 tablet by mouth 3 (Three) Times a Day. 90 tablet 5   • GLUCAGON EMERGENCY 1 MG injection USE AS DIRECTED AS NEEDED 1 kit 0   • hydrochlorothiazide (MICROZIDE) 12.5 MG capsule TAKE 1 CAPSULE BY MOUTH DAILY. 90 capsule 0   • HYDROcodone-acetaminophen (NORCO) 7.5-325 MG per tablet Take 1 tablet by mouth Every 4 (Four) Hours As Needed for Moderate Pain . 180 tablet 0   • insulin aspart (novoLOG FLEXPEN) 100 UNIT/ML solution pen-injector sc pen Inject 40 units with meals (Patient taking differently: Inject 40 Units under the skin 3 (Three) Times a Day With Meals. Inject 40 units with meals  ) 30 mL 5   • Insulin Degludec 200 UNIT/ML solution pen-injector Inject 100 Units under the skin Daily. (Patient taking differently: Inject 100 Units under the skin Every Night.) 6 pen 11   • levoFLOXacin (LEVAQUIN) 750 MG tablet Take 1 tablet by mouth Daily. 5 tablet 0   • LORazepam (ATIVAN) 1 MG tablet Take 1 tablet by mouth 2 (Two) Times a Day As Needed for Anxiety. 60 tablet 2   • metoprolol succinate XL (TOPROL-XL) 25 MG 24 hr tablet Take 1 tablet by mouth Daily. 31 tablet 13   • mirtazapine (REMERON) 45 MG tablet TAKE 1 TABLET BY MOUTH EVERY DAY AT BEDTIME 30 tablet 5   • nabumetone (RELAFEN) 500 MG tablet TAKE 1 TABLET BY MOUTH 2 (TWO) TIMES A DAY AS NEEDED FOR MILD PAIN (1-3). (Patient taking differently: TAKE 1 TABLET BY MOUTH 2 (TWO) TIMES A DAY FOR MILD PAIN (1-3).) 60 tablet 0   • ondansetron (ZOFRAN) 8 MG tablet Take 1 tablet by mouth every 8 (eight) hours. (Patient taking differently: Take 8 mg by mouth Every 8 (Eight) Hours As Needed.) 90 tablet 3   • pantoprazole (PROTONIX) 40 MG EC tablet TAKE 1 TABLET BY MOUTH DAILY. 90 tablet 2   • ranolazine (RANEXA) 500 MG 12 hr tablet Take 2  tablets by mouth 2 (Two) Times a Day. 120 tablet 6   • venlafaxine XR (EFFEXOR-XR) 150 MG 24 hr capsule TAKE ONE CAPSULE BY MOUTH TWICE A DAY FOR DEPRESSION 180 capsule 3   • vitamin D (ERGOCALCIFEROL) 67221 UNITS capsule capsule TAKE ONE CAPSULE BY MOUTH EVERY SUNDAY 12 capsule 2     Facility-Administered Encounter Medications as of 10/25/2017   Medication Dose Route Frequency Provider Last Rate Last Dose   • cyanocobalamin injection 1,000 mcg  1,000 mcg Intramuscular Q28 Days Francisca Fong MD   1,000 mcg at 10/17/17 1047       Orders placed during this encounter include:  Orders Placed This Encounter   Procedures   • Comprehensive Metabolic Panel   • TSH   • Hemoglobin A1c   • Lipid Panel   • Vitamin D 25 Hydroxy   • Protein / Creatinine Ratio, Urine - Urine, Clean Catch   • CBC & Differential     Order Specific Question:   Manual Differential     Answer:   No     Glycemic Management        dexcom sensor            Tresiba -- stopped     Restart 40 units       ==================        Novolog      Taking 60 units      Discussed carb counting but states cannot     She will need to look at her meals because 30 units may not be enough for some meals and for others meals to strong     If you eat a meal and give 30 units and your sugar is 200 or higher before the next meal look back at that meal and the next time you eat it either give more insulin or eat less     Also if you have a low after the meal give less insulin the next time you eat that meal                  ==================     Jardiance 10 mg tablet , take before breakfast---will stop due to dizziness for possible volume depletion-----the dizziness has resolved since stopping jardiance        ==================     Metformin 500 mg tablets - caused diarrhea --stopped      ====================     start bydureon - tolerated but since trouble with gastroparesis, stop            januvia 100 mg daily -- stopped -- restart       ------------------------------     Lipid Management        on Lipitor   on fenofibrate     August 2015     LDL - 122     missed some doses of medication      Feb. 2016     Tg - 241  LDL - 102     missing doses still - please be complaint     May 2016     LDL - 85      Jan. 2017     LDL - 120     Has been taking medication faithfully      LDL needs to be 70 or less     add zetia     Also discussed restarting the jardiance for the heart benefits but refuses                Blood Pressure Management: Control of blood pressure  on ACEi     stopped the lasix   Microvascular Complication Monitoring: Neuropathy type sensorial  neurontin     intermittetly takes reglan for gastroparesis     states eye exam ws 2015  RX for shoes - diabetic neuropathy      Immunization - last influenza vaccine Oct. 2016   Other Diabetes Related Aspects  TSH abnormal from July to Sept 2014     TSH in the 5 to 7 range     confirmed now Jan 2015     start levothyroxine 50 mcgs daily, skip on Sunday     now TSH nl      stopped levothyroxine      TSH - nl     will monitor TSH         August 2015     TSH - nl     FEb. 2016     TSh - 6     she refuses thyroid medicaton      May 2016     TSH - 5     Jan. 2017      TSH - nl     Lab Results   Component Value Date    TSH 5.510 (H) 10/19/2017        does not want medication      ---     3-15     B12 low      now b12 shots     June 2015     B12- 968     Vitamin d def        Vitamin d - 26      Continue vitamin d supplement         Referral to GI      Reason - diarrhea chronic      History of gastroparesis       4. Follow-up: Return in about 3 months (around 1/25/2018) for Recheck.

## 2017-10-30 RX ORDER — RANOLAZINE 500 MG/1
TABLET, FILM COATED, EXTENDED RELEASE ORAL
Qty: 120 TABLET | Refills: 6 | Status: SHIPPED | OUTPATIENT
Start: 2017-10-30 | End: 2018-05-31 | Stop reason: SINTOL

## 2017-10-31 ENCOUNTER — PROCEDURE VISIT (OUTPATIENT)
Dept: CARDIOLOGY | Facility: CLINIC | Age: 57
End: 2017-10-31

## 2017-10-31 DIAGNOSIS — I50.30 HEART FAILURE WITH PRESERVED EJECTION FRACTION, BORDERLINE, CLASS II (HCC): ICD-10-CM

## 2017-10-31 PROCEDURE — 94620 PR PULMONARY STRESS TESTING,SIMPLE: CPT | Performed by: INTERNAL MEDICINE

## 2017-10-31 NOTE — PROGRESS NOTES
Ariana Martinez  Procedure: 6 Minute Walk Test   10/31/17  Indication:heart failure    Pretest: BP:116/70               HR:98               Sa02:94               Dyspnea:1               Fatigue:2    Post Test: BP:120/74               HR:113               Sa02:94               Dyspnea:1               Fatigue:2    First 6MWT:no    Supplemental oxygen during test:no    Stopped before 6 minutes:no    Pauses:1    Results in distance walked:250.46 m    Did individual experience any pain or discomfort:leg weakness    I was present for the above test and agree with the data.     NYHA Functional Class II/III    Can Kwon MD PhD

## 2017-11-08 ENCOUNTER — OFFICE VISIT (OUTPATIENT)
Dept: FAMILY MEDICINE CLINIC | Facility: CLINIC | Age: 57
End: 2017-11-08

## 2017-11-08 VITALS
TEMPERATURE: 97.7 F | SYSTOLIC BLOOD PRESSURE: 126 MMHG | DIASTOLIC BLOOD PRESSURE: 76 MMHG | HEIGHT: 68 IN | HEART RATE: 67 BPM | WEIGHT: 259 LBS | OXYGEN SATURATION: 96 % | BODY MASS INDEX: 39.25 KG/M2

## 2017-11-08 DIAGNOSIS — E55.9 VITAMIN D DEFICIENCY: ICD-10-CM

## 2017-11-08 DIAGNOSIS — F41.1 GAD (GENERALIZED ANXIETY DISORDER): ICD-10-CM

## 2017-11-08 DIAGNOSIS — Z79.891 LONG TERM PRESCRIPTION OPIATE USE: ICD-10-CM

## 2017-11-08 DIAGNOSIS — G89.4 CHRONIC PAIN DISORDER: ICD-10-CM

## 2017-11-08 DIAGNOSIS — R30.0 DYSURIA: Primary | ICD-10-CM

## 2017-11-08 DIAGNOSIS — IMO0002 UNCONTROLLED TYPE 2 DIABETES MELLITUS WITH DIABETIC POLYNEUROPATHY, WITH LONG-TERM CURRENT USE OF INSULIN: Primary | ICD-10-CM

## 2017-11-08 DIAGNOSIS — E53.8 B12 DEFICIENCY: ICD-10-CM

## 2017-11-08 DIAGNOSIS — R30.0 DYSURIA: ICD-10-CM

## 2017-11-08 DIAGNOSIS — E66.09 CLASS 2 OBESITY DUE TO EXCESS CALORIES WITHOUT SERIOUS COMORBIDITY WITH BODY MASS INDEX (BMI) OF 39.0 TO 39.9 IN ADULT: ICD-10-CM

## 2017-11-08 PROBLEM — IMO0001 CLASS 3 OBESITY DUE TO EXCESS CALORIES WITH SERIOUS COMORBIDITY AND BODY MASS INDEX (BMI) OF 40.0 TO 44.9 IN ADULT: Status: RESOLVED | Noted: 2017-10-23 | Resolved: 2017-11-08

## 2017-11-08 LAB
BILIRUB BLD-MCNC: NEGATIVE MG/DL
CLARITY, POC: CLEAR
COLOR UR: YELLOW
GLUCOSE UR STRIP-MCNC: NEGATIVE MG/DL
KETONES UR QL: NEGATIVE
LEUKOCYTE EST, POC: ABNORMAL
NITRITE UR-MCNC: NEGATIVE MG/ML
PH UR: 5 [PH] (ref 5–8)
PROT UR STRIP-MCNC: ABNORMAL MG/DL
RBC # UR STRIP: NEGATIVE /UL
SP GR UR: 1.02 (ref 1–1.03)
UROBILINOGEN UR QL: NORMAL

## 2017-11-08 PROCEDURE — 80307 DRUG TEST PRSMV CHEM ANLYZR: CPT | Performed by: FAMILY MEDICINE

## 2017-11-08 PROCEDURE — 81002 URINALYSIS NONAUTO W/O SCOPE: CPT | Performed by: FAMILY MEDICINE

## 2017-11-08 PROCEDURE — 87086 URINE CULTURE/COLONY COUNT: CPT | Performed by: FAMILY MEDICINE

## 2017-11-08 PROCEDURE — 99214 OFFICE O/P EST MOD 30 MIN: CPT | Performed by: FAMILY MEDICINE

## 2017-11-08 PROCEDURE — 96372 THER/PROPH/DIAG INJ SC/IM: CPT | Performed by: FAMILY MEDICINE

## 2017-11-08 RX ORDER — GABAPENTIN 800 MG/1
800 TABLET ORAL 3 TIMES DAILY
Qty: 90 TABLET | Refills: 2 | Status: SHIPPED | OUTPATIENT
Start: 2017-11-08 | End: 2017-12-29 | Stop reason: SDUPTHER

## 2017-11-08 RX ORDER — HYDROCODONE BITARTRATE AND ACETAMINOPHEN 7.5; 325 MG/1; MG/1
1 TABLET ORAL EVERY 4 HOURS PRN
Qty: 180 TABLET | Refills: 0 | Status: SHIPPED | OUTPATIENT
Start: 2017-11-08 | End: 2017-12-06 | Stop reason: SDUPTHER

## 2017-11-08 RX ORDER — LORAZEPAM 1 MG/1
1 TABLET ORAL 2 TIMES DAILY PRN
Qty: 60 TABLET | Refills: 2 | Status: SHIPPED | OUTPATIENT
Start: 2017-11-08 | End: 2018-02-05 | Stop reason: SDUPTHER

## 2017-11-08 RX ADMIN — CYANOCOBALAMIN 1000 MCG: 1000 INJECTION, SOLUTION INTRAMUSCULAR; SUBCUTANEOUS at 11:38

## 2017-11-08 NOTE — PROGRESS NOTES
Subjective   Chief Complaint   Patient presents with   • Diabetes   • Med Refill     hydrocodone last dose last night   • Urinary Tract Infection     Ariana Martinez is a 57 y.o. female.   Diabetes; Med Refill (hydrocodone last dose last night); and Urinary Tract Infection    History of Present Illness     Diabetic eye examination -  Done, need to get record    Diabetes - controlled   Average readings of FBG levels are 100-140  Currently prescribed novolog, tresiba  Has previously failed metformin, bydureon, jardiance  januvia was too expensive  Reviewed notes from Dr Kingston Tafoya's office on previous medications used    Chronic joint pain - controlled with norco  uds is appropriate and up to date    MAURICIO - controlled with ativan PRN    Obesity - patient has been counseled by numerous providers on the significance of weight loss    C/o dysuria  Concerned about possible UTI    Diabetic peripheral neuropathy - confirmed by EMG with Dr Mansfield  Pain not controlled with gabapentin    The following portions of the patient's history were reviewed and updated as appropriate: allergies, current medications, past family history, past medical history, past social history, past surgical history and problem list.    Review of Systems   Constitutional: Negative for appetite change, chills, fatigue and fever.   HENT: Negative for congestion, ear pain, rhinorrhea and sore throat.    Eyes: Negative for pain.   Respiratory: Negative for cough and shortness of breath.    Cardiovascular: Negative for chest pain and palpitations.   Gastrointestinal: Negative for abdominal pain, constipation, diarrhea and nausea.   Genitourinary: Positive for dysuria.   Musculoskeletal: Positive for arthralgias and back pain. Negative for joint swelling and neck pain.   Skin: Negative for rash.   Neurological: Positive for numbness. Negative for dizziness and headaches.        Parasthesia       Objective   /76  Pulse 67  Temp 97.7 °F (36.5  "°C)  Ht 68\" (172.7 cm)  Wt 259 lb (117 kg)  SpO2 96%  BMI 39.38 kg/m2  Physical Exam   Constitutional: She is oriented to person, place, and time. She appears well-developed and well-nourished.   HENT:   Head: Normocephalic and atraumatic.   Eyes: Pupils are equal, round, and reactive to light.   Neck: Normal range of motion. Neck supple.   Cardiovascular: Normal rate, regular rhythm and normal heart sounds.    Pulmonary/Chest: Effort normal and breath sounds normal. No respiratory distress. She has no wheezes. She has no rales.   Abdominal: Soft. Bowel sounds are normal.   Central obesity   Musculoskeletal: Normal range of motion.   Neurological: She is alert and oriented to person, place, and time.   Skin: Skin is warm and dry.   Psychiatric: She has a normal mood and affect.   Nursing note and vitals reviewed.      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, with long-term current use of insulin - Primary       Other    MAURICIO (generalized anxiety disorder)    Relevant Medications    LORazepam (ATIVAN) 1 MG tablet    Other Relevant Orders    Urine Drug Screen - Urine, Clean Catch    Long term prescription opiate use    Relevant Orders    Urine Drug Screen - Urine, Clean Catch    Chronic pain disorder    Relevant Medications    HYDROcodone-acetaminophen (NORCO) 7.5-325 MG per tablet    Other Relevant Orders    Urine Drug Screen - Urine, Clean Catch      Other Visit Diagnoses     Dysuria        Relevant Orders    Urine Culture - Urine, Urine, Clean Catch    Class 2 obesity due to excess calories without serious comorbidity with body mass index (BMI) of 39.0 to 39.9 in adult            Medical release for dm eye exam - Dr England     Urinalysis today - negative  Urine culture ordered  uds today    Schedule a dm foot exam - next month    Adjusted gabapentin  Reviewed " EMG results  Follow up with Dr Cruz as scheduled    Refilled norco    Reviewed lab results  Continue with vitamin D    Recheck in 4 weeks for dm foot exam

## 2017-11-08 NOTE — PATIENT INSTRUCTIONS
Iron-Rich Diet  Iron is a mineral that helps your body to produce hemoglobin. Hemoglobin is a protein in your red blood cells that carries oxygen to your body's tissues. Eating too little iron may cause you to feel weak and tired, and it can increase your risk for infection. Eating enough iron is necessary for your body's metabolism, muscle function, and nervous system.  Iron is naturally found in many foods. It can also be added to foods or fortified in foods. There are two types of dietary iron:  · Heme iron. Heme iron is absorbed by the body more easily than nonheme iron. Heme iron is found in meat, poultry, and fish.  · Nonheme iron. Nonheme iron is found in dietary supplements, iron-fortified grains, beans, and vegetables.  You may need to follow an iron-rich diet if:  · You have been diagnosed with iron deficiency or iron-deficiency anemia.  · You have a condition that prevents you from absorbing dietary iron, such as:    Infection in your intestines.    Celiac disease. This involves long-lasting (chronic) inflammation of your intestines.  · You do not eat enough iron.  · You eat a diet that is high in foods that impair iron absorption.  · You have lost a lot of blood.  · You have heavy bleeding during your menstrual cycle.  · You are pregnant.  WHAT IS MY PLAN?  Your health care provider may help you to determine how much iron you need per day based on your condition. Generally, when a person consumes sufficient amounts of iron in the diet, the following iron needs are met:  · Men.    14-18 years old: 11 mg per day.    19-50 years old: 8 mg per day.  · Women.      14-18 years old: 15 mg per day.    19-50 years old: 18 mg per day.    Over 50 years old: 8 mg per day.    Pregnant women: 27 mg per day.    Breastfeeding women: 9 mg per day.  WHAT DO I NEED TO KNOW ABOUT AN IRON-RICH DIET?  · Eat fresh fruits and vegetables that are high in vitamin C along with foods that are high in iron. This will help increase  the amount of iron that your body absorbs from food, especially with foods containing nonheme iron. Foods that are high in vitamin C include oranges, peppers, tomatoes, and anh.  · Take iron supplements only as directed by your health care provider. Overdose of iron can be life-threatening. If you were prescribed iron supplements, take them with orange juice or a vitamin C supplement.  · Cook foods in pots and pans that are made from iron.    · Eat nonheme iron-containing foods alongside foods that are high in heme iron. This helps to improve your iron absorption.    · Certain foods and drinks contain compounds that impair iron absorption. Avoid eating these foods in the same meal as iron-rich foods or with iron supplements. These include:    Coffee, black tea, and red wine.    Milk, dairy products, and foods that are high in calcium.    Beans, soybeans, and peas.    Whole grains.  · When eating foods that contain both nonheme iron and compounds that impair iron absorption, follow these tips to absorb iron better.      Soak beans overnight before cooking.    Soak whole grains overnight and drain them before using.    Ferment flours before baking, such as using yeast in bread dough.  WHAT FOODS CAN I EAT?  Grains   Iron-fortified breakfast cereal. Iron-fortified whole-wheat bread. Enriched rice. Sprouted grains.  Vegetables   Spinach. Potatoes with skin. Green peas. Broccoli. Red and green bell peppers. Fermented vegetables.  Fruits   Prunes. Raisins. Oranges. Strawberries. Anh. Grapefruit.  Meats and Other Protein Sources   Beef liver. Oysters. Beef. Shrimp. Turkey. Chicken. Tuna. Sardines. Chickpeas. Nuts. Tofu.  Beverages   Tomato juice. Fresh orange juice. Prune juice. Hibiscus tea. Fortified instant breakfast shakes.  Condiments   Tahini. Fermented soy sauce.   Sweets and Desserts   Black-strap molasses.   Other   Wheat germ.  The items listed above may not be a complete list of recommended foods or  beverages. Contact your dietitian for more options.   WHAT FOODS ARE NOT RECOMMENDED?  Grains   Whole grains. Bran cereal. Bran flour. Oats.  Vegetables   Artichokes. Forest sprouts. Kale.  Fruits   Blueberries. Raspberries. Strawberries. Figs.  Meats and Other Protein Sources   Soybeans. Products made from soy protein.  Dairy   Milk. Cream. Cheese. Yogurt. Cottage cheese.  Beverages   Coffee. Black tea. Red wine.  Sweets and Desserts   Cocoa. Chocolate. Ice cream.  Other   Basil. Oregano. Parsley.  The items listed above may not be a complete list of foods and beverages to avoid. Contact your dietitian for more information.      This information is not intended to replace advice given to you by your health care provider. Make sure you discuss any questions you have with your health care provider.     Document Released: 08/01/2006 Document Revised: 01/08/2016 Document Reviewed: 07/15/2015  [employer] Patient Education ©2017 [employer]

## 2017-11-10 LAB — BACTERIA SPEC AEROBE CULT: NO GROWTH

## 2017-11-27 ENCOUNTER — APPOINTMENT (OUTPATIENT)
Dept: CT IMAGING | Facility: HOSPITAL | Age: 57
End: 2017-11-27

## 2017-11-27 ENCOUNTER — APPOINTMENT (OUTPATIENT)
Dept: GENERAL RADIOLOGY | Facility: HOSPITAL | Age: 57
End: 2017-11-27

## 2017-11-27 ENCOUNTER — HOSPITAL ENCOUNTER (OUTPATIENT)
Facility: HOSPITAL | Age: 57
Setting detail: OBSERVATION
Discharge: HOME | End: 2017-11-28
Attending: EMERGENCY MEDICINE | Admitting: FAMILY MEDICINE

## 2017-11-27 DIAGNOSIS — I95.9 HYPOTENSION, UNSPECIFIED HYPOTENSION TYPE: ICD-10-CM

## 2017-11-27 DIAGNOSIS — R53.1 WEAKNESS GENERALIZED: Primary | ICD-10-CM

## 2017-11-27 DIAGNOSIS — Z78.9 IMPAIRED MOBILITY AND ACTIVITIES OF DAILY LIVING: ICD-10-CM

## 2017-11-27 DIAGNOSIS — Z74.09 IMPAIRED MOBILITY AND ACTIVITIES OF DAILY LIVING: ICD-10-CM

## 2017-11-27 PROBLEM — R10.13 CHRONIC EPIGASTRIC PAIN: Status: ACTIVE | Noted: 2017-11-27

## 2017-11-27 PROBLEM — G89.29 CHRONIC EPIGASTRIC PAIN: Status: ACTIVE | Noted: 2017-11-27

## 2017-11-27 PROBLEM — N17.9 ACUTE RENAL FAILURE SUPERIMPOSED ON STAGE 3 CHRONIC KIDNEY DISEASE (HCC): Status: ACTIVE | Noted: 2017-08-27
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
## 2017-11-27 PROBLEM — N30.00 ACUTE CYSTITIS WITHOUT HEMATURIA: Status: ACTIVE | Noted: 2017-11-27

## 2017-11-27 LAB
ALBUMIN SERPL-MCNC: 3.7 G/DL (ref 3.4–4.8)
ALBUMIN/GLOB SERPL: 1.2 G/DL (ref 1.1–1.8)
ALP SERPL-CCNC: 103 U/L (ref 38–126)
ALT SERPL W P-5'-P-CCNC: 29 U/L (ref 9–52)
ANION GAP SERPL CALCULATED.3IONS-SCNC: 9 MMOL/L (ref 5–15)
AST SERPL-CCNC: 36 U/L (ref 14–36)
BACTERIA UR QL AUTO: ABNORMAL /HPF
BASOPHILS # BLD AUTO: 0.04 10*3/MM3 (ref 0–0.2)
BASOPHILS NFR BLD AUTO: 0.4 % (ref 0–2)
BILIRUB SERPL-MCNC: 0.5 MG/DL (ref 0.2–1.3)
BILIRUB UR QL STRIP: NEGATIVE
BUN BLD-MCNC: 14 MG/DL (ref 7–21)
BUN/CREAT SERPL: 10.5 (ref 7–25)
CALCIUM SPEC-SCNC: 9.3 MG/DL (ref 8.4–10.2)
CHLORIDE SERPL-SCNC: 98 MMOL/L (ref 95–110)
CLARITY UR: CLEAR
CO2 SERPL-SCNC: 32 MMOL/L (ref 22–31)
COLOR UR: YELLOW
CREAT BLD-MCNC: 1.33 MG/DL (ref 0.5–1)
DEPRECATED RDW RBC AUTO: 44.4 FL (ref 36.4–46.3)
EOSINOPHIL # BLD AUTO: 0.29 10*3/MM3 (ref 0–0.7)
EOSINOPHIL NFR BLD AUTO: 2.6 % (ref 0–7)
ERYTHROCYTE [DISTWIDTH] IN BLOOD BY AUTOMATED COUNT: 13.5 % (ref 11.5–14.5)
GFR SERPL CREATININE-BSD FRML MDRD: 41 ML/MIN/1.73 (ref 60–120)
GLOBULIN UR ELPH-MCNC: 3.1 GM/DL (ref 2.3–3.5)
GLUCOSE BLD-MCNC: 151 MG/DL (ref 60–100)
GLUCOSE UR STRIP-MCNC: ABNORMAL MG/DL
HCT VFR BLD AUTO: 34.8 % (ref 35–45)
HGB BLD-MCNC: 11.5 G/DL (ref 12–15.5)
HGB UR QL STRIP.AUTO: NEGATIVE
HOLD SPECIMEN: NORMAL
HOLD SPECIMEN: NORMAL
HYALINE CASTS UR QL AUTO: ABNORMAL /LPF
IMM GRANULOCYTES # BLD: 0.04 10*3/MM3 (ref 0–0.02)
IMM GRANULOCYTES NFR BLD: 0.4 % (ref 0–0.5)
KETONES UR QL STRIP: NEGATIVE
LEUKOCYTE ESTERASE UR QL STRIP.AUTO: ABNORMAL
LIPASE SERPL-CCNC: 23 U/L (ref 23–300)
LYMPHOCYTES # BLD AUTO: 3.38 10*3/MM3 (ref 0.6–4.2)
LYMPHOCYTES NFR BLD AUTO: 30.8 % (ref 10–50)
MCH RBC QN AUTO: 29.2 PG (ref 26.5–34)
MCHC RBC AUTO-ENTMCNC: 33 G/DL (ref 31.4–36)
MCV RBC AUTO: 88.3 FL (ref 80–98)
MONOCYTES # BLD AUTO: 0.87 10*3/MM3 (ref 0–0.9)
MONOCYTES NFR BLD AUTO: 7.9 % (ref 0–12)
NEUTROPHILS # BLD AUTO: 6.35 10*3/MM3 (ref 2–8.6)
NEUTROPHILS NFR BLD AUTO: 57.9 % (ref 37–80)
NITRITE UR QL STRIP: NEGATIVE
PH UR STRIP.AUTO: <=5 [PH] (ref 5–9)
PLATELET # BLD AUTO: 347 10*3/MM3 (ref 150–450)
PMV BLD AUTO: 10.1 FL (ref 8–12)
POTASSIUM BLD-SCNC: 4.2 MMOL/L (ref 3.5–5.1)
PROT SERPL-MCNC: 6.8 G/DL (ref 6.3–8.6)
PROT UR QL STRIP: NEGATIVE
RBC # BLD AUTO: 3.94 10*6/MM3 (ref 3.77–5.16)
RBC # UR: ABNORMAL /HPF
REF LAB TEST METHOD: ABNORMAL
SODIUM BLD-SCNC: 139 MMOL/L (ref 137–145)
SP GR UR STRIP: 1.01 (ref 1–1.03)
SQUAMOUS #/AREA URNS HPF: ABNORMAL /HPF
TROPONIN I SERPL-MCNC: <0.012 NG/ML
UROBILINOGEN UR QL STRIP: ABNORMAL
WBC NRBC COR # BLD: 10.97 10*3/MM3 (ref 3.2–9.8)
WBC UR QL AUTO: ABNORMAL /HPF
WHOLE BLOOD HOLD SPECIMEN: NORMAL
WHOLE BLOOD HOLD SPECIMEN: NORMAL

## 2017-11-27 PROCEDURE — 84484 ASSAY OF TROPONIN QUANT: CPT | Performed by: EMERGENCY MEDICINE

## 2017-11-27 PROCEDURE — 85025 COMPLETE CBC W/AUTO DIFF WBC: CPT | Performed by: EMERGENCY MEDICINE

## 2017-11-27 PROCEDURE — G0378 HOSPITAL OBSERVATION PER HR: HCPCS

## 2017-11-27 PROCEDURE — 71275 CT ANGIOGRAPHY CHEST: CPT

## 2017-11-27 PROCEDURE — 96374 THER/PROPH/DIAG INJ IV PUSH: CPT

## 2017-11-27 PROCEDURE — 0 IODIXANOL PER 1 ML: Performed by: EMERGENCY MEDICINE

## 2017-11-27 PROCEDURE — 93005 ELECTROCARDIOGRAM TRACING: CPT | Performed by: EMERGENCY MEDICINE

## 2017-11-27 PROCEDURE — 73564 X-RAY EXAM KNEE 4 OR MORE: CPT

## 2017-11-27 PROCEDURE — 96361 HYDRATE IV INFUSION ADD-ON: CPT

## 2017-11-27 PROCEDURE — 80053 COMPREHEN METABOLIC PANEL: CPT | Performed by: EMERGENCY MEDICINE

## 2017-11-27 PROCEDURE — 25010000002 ONDANSETRON PER 1 MG: Performed by: EMERGENCY MEDICINE

## 2017-11-27 PROCEDURE — 71010 HC CHEST PA OR AP: CPT

## 2017-11-27 PROCEDURE — 99285 EMERGENCY DEPT VISIT HI MDM: CPT

## 2017-11-27 PROCEDURE — 93010 ELECTROCARDIOGRAM REPORT: CPT | Performed by: INTERNAL MEDICINE

## 2017-11-27 PROCEDURE — 81001 URINALYSIS AUTO W/SCOPE: CPT | Performed by: EMERGENCY MEDICINE

## 2017-11-27 PROCEDURE — 74176 CT ABD & PELVIS W/O CONTRAST: CPT

## 2017-11-27 PROCEDURE — 83690 ASSAY OF LIPASE: CPT | Performed by: EMERGENCY MEDICINE

## 2017-11-27 RX ORDER — SODIUM CHLORIDE 0.9 % (FLUSH) 0.9 %
10 SYRINGE (ML) INJECTION AS NEEDED
Status: DISCONTINUED | OUTPATIENT
Start: 2017-11-27 | End: 2017-11-28 | Stop reason: HOSPADM

## 2017-11-27 RX ORDER — ALUMINA, MAGNESIA, AND SIMETHICONE 2400; 2400; 240 MG/30ML; MG/30ML; MG/30ML
15 SUSPENSION ORAL ONCE
Status: COMPLETED | OUTPATIENT
Start: 2017-11-27 | End: 2017-11-27

## 2017-11-27 RX ORDER — ONDANSETRON 2 MG/ML
4 INJECTION INTRAMUSCULAR; INTRAVENOUS ONCE
Status: COMPLETED | OUTPATIENT
Start: 2017-11-27 | End: 2017-11-27

## 2017-11-27 RX ORDER — MORPHINE SULFATE 2 MG/ML
4 INJECTION, SOLUTION INTRAMUSCULAR; INTRAVENOUS ONCE
Status: DISCONTINUED | OUTPATIENT
Start: 2017-11-27 | End: 2017-11-28 | Stop reason: HOSPADM

## 2017-11-27 RX ORDER — SODIUM CHLORIDE 9 MG/ML
75 INJECTION, SOLUTION INTRAVENOUS CONTINUOUS
Status: DISCONTINUED | OUTPATIENT
Start: 2017-11-27 | End: 2017-11-28

## 2017-11-27 RX ORDER — IODIXANOL 320 MG/ML
70 INJECTION, SOLUTION INTRAVASCULAR
Status: COMPLETED | OUTPATIENT
Start: 2017-11-27 | End: 2017-11-27

## 2017-11-27 RX ADMIN — LIDOCAINE HYDROCHLORIDE 15 ML: 20 SOLUTION ORAL; TOPICAL at 19:37

## 2017-11-27 RX ADMIN — SODIUM CHLORIDE 1000 ML: 900 INJECTION, SOLUTION INTRAVENOUS at 16:21

## 2017-11-27 RX ADMIN — ONDANSETRON 4 MG: 2 INJECTION INTRAMUSCULAR; INTRAVENOUS at 16:20

## 2017-11-27 RX ADMIN — ALUMINUM HYDROXIDE, MAGNESIUM HYDROXIDE, AND DIMETHICONE 15 ML: 400; 400; 40 SUSPENSION ORAL at 19:37

## 2017-11-27 RX ADMIN — IODIXANOL 70 ML: 320 INJECTION, SOLUTION INTRAVASCULAR at 21:38

## 2017-11-28 VITALS
TEMPERATURE: 96.6 F | RESPIRATION RATE: 18 BRPM | SYSTOLIC BLOOD PRESSURE: 121 MMHG | HEART RATE: 78 BPM | WEIGHT: 250 LBS | HEIGHT: 68 IN | BODY MASS INDEX: 37.89 KG/M2 | DIASTOLIC BLOOD PRESSURE: 69 MMHG | OXYGEN SATURATION: 93 %

## 2017-11-28 LAB
BASOPHILS # BLD AUTO: 0.02 10*3/MM3 (ref 0–0.2)
BASOPHILS NFR BLD AUTO: 0.2 % (ref 0–2)
D-LACTATE SERPL-SCNC: 1.3 MMOL/L (ref 0.5–2)
DEPRECATED RDW RBC AUTO: 45.4 FL (ref 36.4–46.3)
EOSINOPHIL # BLD AUTO: 0.33 10*3/MM3 (ref 0–0.7)
EOSINOPHIL NFR BLD AUTO: 3.8 % (ref 0–7)
ERYTHROCYTE [DISTWIDTH] IN BLOOD BY AUTOMATED COUNT: 13.9 % (ref 11.5–14.5)
GLUCOSE BLDC GLUCOMTR-MCNC: 114 MG/DL (ref 70–130)
GLUCOSE BLDC GLUCOMTR-MCNC: 123 MG/DL (ref 70–130)
GLUCOSE BLDC GLUCOMTR-MCNC: 131 MG/DL (ref 70–130)
GLUCOSE BLDC GLUCOMTR-MCNC: 87 MG/DL (ref 70–130)
HCT VFR BLD AUTO: 37.2 % (ref 35–45)
HGB BLD-MCNC: 12.3 G/DL (ref 12–15.5)
IMM GRANULOCYTES # BLD: 0.02 10*3/MM3 (ref 0–0.02)
IMM GRANULOCYTES NFR BLD: 0.2 % (ref 0–0.5)
LYMPHOCYTES # BLD AUTO: 2.86 10*3/MM3 (ref 0.6–4.2)
LYMPHOCYTES NFR BLD AUTO: 33.2 % (ref 10–50)
MCH RBC QN AUTO: 29.6 PG (ref 26.5–34)
MCHC RBC AUTO-ENTMCNC: 33.1 G/DL (ref 31.4–36)
MCV RBC AUTO: 89.4 FL (ref 80–98)
MONOCYTES # BLD AUTO: 0.71 10*3/MM3 (ref 0–0.9)
MONOCYTES NFR BLD AUTO: 8.2 % (ref 0–12)
NEUTROPHILS # BLD AUTO: 4.67 10*3/MM3 (ref 2–8.6)
NEUTROPHILS NFR BLD AUTO: 54.4 % (ref 37–80)
NRBC BLD MANUAL-RTO: 0 /100 WBC (ref 0–0)
PLATELET # BLD AUTO: 336 10*3/MM3 (ref 150–450)
PMV BLD AUTO: 9.2 FL (ref 8–12)
RBC # BLD AUTO: 4.16 10*6/MM3 (ref 3.77–5.16)
WBC NRBC COR # BLD: 8.61 10*3/MM3 (ref 3.2–9.8)

## 2017-11-28 PROCEDURE — G0378 HOSPITAL OBSERVATION PER HR: HCPCS

## 2017-11-28 PROCEDURE — 25010000002 CEFTRIAXONE PER 250 MG: Performed by: FAMILY MEDICINE

## 2017-11-28 PROCEDURE — 83605 ASSAY OF LACTIC ACID: CPT | Performed by: EMERGENCY MEDICINE

## 2017-11-28 PROCEDURE — 63710000001 INSULIN DETEMIR PER 5 UNITS: Performed by: FAMILY MEDICINE

## 2017-11-28 PROCEDURE — 82962 GLUCOSE BLOOD TEST: CPT

## 2017-11-28 PROCEDURE — 97535 SELF CARE MNGMENT TRAINING: CPT

## 2017-11-28 PROCEDURE — 25010000002 ONDANSETRON PER 1 MG: Performed by: FAMILY MEDICINE

## 2017-11-28 PROCEDURE — 63710000001 INSULIN ASPART PER 5 UNITS: Performed by: FAMILY MEDICINE

## 2017-11-28 PROCEDURE — 96375 TX/PRO/DX INJ NEW DRUG ADDON: CPT

## 2017-11-28 PROCEDURE — 97165 OT EVAL LOW COMPLEX 30 MIN: CPT

## 2017-11-28 PROCEDURE — G8987 SELF CARE CURRENT STATUS: HCPCS

## 2017-11-28 PROCEDURE — 85025 COMPLETE CBC W/AUTO DIFF WBC: CPT | Performed by: FAMILY MEDICINE

## 2017-11-28 PROCEDURE — 96376 TX/PRO/DX INJ SAME DRUG ADON: CPT

## 2017-11-28 PROCEDURE — G8988 SELF CARE GOAL STATUS: HCPCS

## 2017-11-28 PROCEDURE — 94799 UNLISTED PULMONARY SVC/PX: CPT

## 2017-11-28 PROCEDURE — G8989 SELF CARE D/C STATUS: HCPCS

## 2017-11-28 PROCEDURE — 94760 N-INVAS EAR/PLS OXIMETRY 1: CPT

## 2017-11-28 PROCEDURE — 99220 PR INITIAL OBSERVATION CARE/DAY 70 MINUTES: CPT | Performed by: FAMILY MEDICINE

## 2017-11-28 RX ORDER — METOPROLOL SUCCINATE 25 MG/1
25 TABLET, EXTENDED RELEASE ORAL DAILY
Status: DISCONTINUED | OUTPATIENT
Start: 2017-11-28 | End: 2017-11-28 | Stop reason: HOSPADM

## 2017-11-28 RX ORDER — LORAZEPAM 1 MG/1
1 TABLET ORAL 2 TIMES DAILY PRN
Status: DISCONTINUED | OUTPATIENT
Start: 2017-11-28 | End: 2017-11-28 | Stop reason: HOSPADM

## 2017-11-28 RX ORDER — DOCUSATE SODIUM 100 MG/1
100 CAPSULE, LIQUID FILLED ORAL 2 TIMES DAILY PRN
Status: DISCONTINUED | OUTPATIENT
Start: 2017-11-28 | End: 2017-11-28 | Stop reason: HOSPADM

## 2017-11-28 RX ORDER — SODIUM CHLORIDE FOR INHALATION 0.9 %
VIAL, NEBULIZER (ML) INHALATION
Status: DISCONTINUED
Start: 2017-11-28 | End: 2017-11-28 | Stop reason: HOSPADM

## 2017-11-28 RX ORDER — ONDANSETRON 2 MG/ML
4 INJECTION INTRAMUSCULAR; INTRAVENOUS EVERY 6 HOURS PRN
Status: DISCONTINUED | OUTPATIENT
Start: 2017-11-28 | End: 2017-11-28 | Stop reason: HOSPADM

## 2017-11-28 RX ORDER — ATORVASTATIN CALCIUM 40 MG/1
40 TABLET, FILM COATED ORAL NIGHTLY
Status: DISCONTINUED | OUTPATIENT
Start: 2017-11-28 | End: 2017-11-28 | Stop reason: HOSPADM

## 2017-11-28 RX ORDER — METOCLOPRAMIDE 10 MG/1
10 TABLET ORAL
Status: DISCONTINUED | OUTPATIENT
Start: 2017-11-28 | End: 2017-11-28 | Stop reason: HOSPADM

## 2017-11-28 RX ORDER — SODIUM CHLORIDE 0.9 % (FLUSH) 0.9 %
1-10 SYRINGE (ML) INJECTION AS NEEDED
Status: DISCONTINUED | OUTPATIENT
Start: 2017-11-28 | End: 2017-11-28 | Stop reason: HOSPADM

## 2017-11-28 RX ORDER — MIRTAZAPINE 15 MG/1
45 TABLET, FILM COATED ORAL NIGHTLY
Status: DISCONTINUED | OUTPATIENT
Start: 2017-11-28 | End: 2017-11-28 | Stop reason: HOSPADM

## 2017-11-28 RX ORDER — CEFTRIAXONE 1 G/1
1 INJECTION, POWDER, FOR SOLUTION INTRAMUSCULAR; INTRAVENOUS ONCE
Qty: 1 G | Refills: 0 | Status: SHIPPED | OUTPATIENT
Start: 2017-11-28 | End: 2017-11-28 | Stop reason: HOSPADM

## 2017-11-28 RX ORDER — RANOLAZINE 500 MG/1
500 TABLET, EXTENDED RELEASE ORAL EVERY 12 HOURS SCHEDULED
Status: DISCONTINUED | OUTPATIENT
Start: 2017-11-28 | End: 2017-11-28 | Stop reason: HOSPADM

## 2017-11-28 RX ORDER — ASPIRIN 81 MG/1
81 TABLET, CHEWABLE ORAL DAILY
Status: DISCONTINUED | OUTPATIENT
Start: 2017-11-28 | End: 2017-11-28 | Stop reason: HOSPADM

## 2017-11-28 RX ORDER — GABAPENTIN 400 MG/1
800 CAPSULE ORAL EVERY 8 HOURS SCHEDULED
Status: DISCONTINUED | OUTPATIENT
Start: 2017-11-28 | End: 2017-11-28 | Stop reason: HOSPADM

## 2017-11-28 RX ORDER — SODIUM CHLORIDE 9 MG/ML
125 INJECTION, SOLUTION INTRAVENOUS CONTINUOUS
Status: DISCONTINUED | OUTPATIENT
Start: 2017-11-28 | End: 2017-11-28

## 2017-11-28 RX ORDER — SULFAMETHOXAZOLE AND TRIMETHOPRIM 800; 160 MG/1; MG/1
1 TABLET ORAL 2 TIMES DAILY
Qty: 4 TABLET | Refills: 0 | Status: SHIPPED | OUTPATIENT
Start: 2017-11-28 | End: 2017-11-30

## 2017-11-28 RX ORDER — CLOPIDOGREL BISULFATE 75 MG/1
75 TABLET ORAL DAILY
Status: DISCONTINUED | OUTPATIENT
Start: 2017-11-28 | End: 2017-11-28 | Stop reason: HOSPADM

## 2017-11-28 RX ORDER — ARIPIPRAZOLE 15 MG/1
15 TABLET ORAL DAILY
Status: DISCONTINUED | OUTPATIENT
Start: 2017-11-28 | End: 2017-11-28 | Stop reason: HOSPADM

## 2017-11-28 RX ORDER — FLUTICASONE PROPIONATE 50 MCG
1 SPRAY, SUSPENSION (ML) NASAL 2 TIMES DAILY
Status: DISCONTINUED | OUTPATIENT
Start: 2017-11-28 | End: 2017-11-28 | Stop reason: HOSPADM

## 2017-11-28 RX ORDER — NAPROXEN 250 MG/1
250 TABLET ORAL 2 TIMES DAILY WITH MEALS
Status: DISCONTINUED | OUTPATIENT
Start: 2017-11-28 | End: 2017-11-28 | Stop reason: HOSPADM

## 2017-11-28 RX ORDER — PANTOPRAZOLE SODIUM 40 MG/1
40 TABLET, DELAYED RELEASE ORAL
Status: DISCONTINUED | OUTPATIENT
Start: 2017-11-28 | End: 2017-11-28 | Stop reason: HOSPADM

## 2017-11-28 RX ORDER — VENLAFAXINE HYDROCHLORIDE 75 MG/1
150 CAPSULE, EXTENDED RELEASE ORAL
Status: DISCONTINUED | OUTPATIENT
Start: 2017-11-28 | End: 2017-11-28 | Stop reason: HOSPADM

## 2017-11-28 RX ADMIN — METOPROLOL SUCCINATE 25 MG: 25 TABLET, EXTENDED RELEASE ORAL at 08:34

## 2017-11-28 RX ADMIN — SODIUM CHLORIDE 125 ML/HR: 9 INJECTION, SOLUTION INTRAVENOUS at 02:08

## 2017-11-28 RX ADMIN — SODIUM CHLORIDE 75 ML/HR: 9 INJECTION, SOLUTION INTRAVENOUS at 11:02

## 2017-11-28 RX ADMIN — ATORVASTATIN CALCIUM 40 MG: 40 TABLET, FILM COATED ORAL at 02:12

## 2017-11-28 RX ADMIN — METOCLOPRAMIDE 10 MG: 10 TABLET ORAL at 11:02

## 2017-11-28 RX ADMIN — NAPROXEN 250 MG: 250 TABLET ORAL at 08:33

## 2017-11-28 RX ADMIN — VENLAFAXINE HYDROCHLORIDE 150 MG: 75 CAPSULE, EXTENDED RELEASE ORAL at 08:33

## 2017-11-28 RX ADMIN — ASPIRIN 81 MG 81 MG: 81 TABLET ORAL at 08:33

## 2017-11-28 RX ADMIN — CLOPIDOGREL BISULFATE 75 MG: 75 TABLET ORAL at 08:33

## 2017-11-28 RX ADMIN — RANOLAZINE 500 MG: 500 TABLET, FILM COATED, EXTENDED RELEASE ORAL at 08:33

## 2017-11-28 RX ADMIN — MIRTAZAPINE 45 MG: 15 TABLET, FILM COATED ORAL at 02:10

## 2017-11-28 RX ADMIN — INSULIN ASPART 40 UNITS: 100 INJECTION, SOLUTION INTRAVENOUS; SUBCUTANEOUS at 08:33

## 2017-11-28 RX ADMIN — ONDANSETRON 4 MG: 2 INJECTION INTRAMUSCULAR; INTRAVENOUS at 02:11

## 2017-11-28 RX ADMIN — FLUTICASONE PROPIONATE 1 SPRAY: 50 SPRAY, METERED NASAL at 08:33

## 2017-11-28 RX ADMIN — INSULIN DETEMIR 100 UNITS: 100 INJECTION, SOLUTION SUBCUTANEOUS at 02:19

## 2017-11-28 RX ADMIN — METOCLOPRAMIDE 10 MG: 10 TABLET ORAL at 08:33

## 2017-11-28 RX ADMIN — GABAPENTIN 800 MG: 400 CAPSULE ORAL at 15:02

## 2017-11-28 RX ADMIN — PANTOPRAZOLE SODIUM 40 MG: 40 TABLET, DELAYED RELEASE ORAL at 06:01

## 2017-11-28 RX ADMIN — WATER 1 G: 1 INJECTION INTRAMUSCULAR; INTRAVENOUS; SUBCUTANEOUS at 02:09

## 2017-11-28 RX ADMIN — ARIPIPRAZOLE 15 MG: 15 TABLET ORAL at 08:34

## 2017-11-28 RX ADMIN — GABAPENTIN 800 MG: 400 CAPSULE ORAL at 06:01

## 2017-11-28 RX ADMIN — ONDANSETRON 4 MG: 2 INJECTION INTRAMUSCULAR; INTRAVENOUS at 08:40

## 2017-12-01 LAB
GLUCOSE BLDC GLUCOMTR-MCNC: 40 MG/DL (ref 70–130)
GLUCOSE BLDC GLUCOMTR-MCNC: 45 MG/DL (ref 70–130)

## 2017-12-06 ENCOUNTER — OFFICE VISIT (OUTPATIENT)
Dept: FAMILY MEDICINE CLINIC | Facility: CLINIC | Age: 57
End: 2017-12-06

## 2017-12-06 VITALS
OXYGEN SATURATION: 97 % | DIASTOLIC BLOOD PRESSURE: 62 MMHG | HEIGHT: 68 IN | TEMPERATURE: 97.6 F | WEIGHT: 264 LBS | HEART RATE: 93 BPM | BODY MASS INDEX: 40.01 KG/M2 | SYSTOLIC BLOOD PRESSURE: 94 MMHG

## 2017-12-06 DIAGNOSIS — R25.1 OCCASIONAL TREMORS: ICD-10-CM

## 2017-12-06 DIAGNOSIS — I95.2 HYPOTENSION DUE TO DRUGS: ICD-10-CM

## 2017-12-06 DIAGNOSIS — R30.0 DYSURIA: Primary | ICD-10-CM

## 2017-12-06 DIAGNOSIS — E66.01 MORBID OBESITY DUE TO EXCESS CALORIES (HCC): ICD-10-CM

## 2017-12-06 DIAGNOSIS — M25.50 CHRONIC PAIN OF MULTIPLE JOINTS: ICD-10-CM

## 2017-12-06 DIAGNOSIS — E11.9 ENCOUNTER FOR COMPREHENSIVE DIABETIC FOOT EXAMINATION, TYPE 2 DIABETES MELLITUS (HCC): Primary | ICD-10-CM

## 2017-12-06 DIAGNOSIS — E11.42 DIABETIC PERIPHERAL NEUROPATHY ASSOCIATED WITH TYPE 2 DIABETES MELLITUS (HCC): ICD-10-CM

## 2017-12-06 DIAGNOSIS — G89.29 CHRONIC PAIN OF MULTIPLE JOINTS: ICD-10-CM

## 2017-12-06 DIAGNOSIS — E53.8 B12 DEFICIENCY: ICD-10-CM

## 2017-12-06 DIAGNOSIS — R30.0 DYSURIA: ICD-10-CM

## 2017-12-06 DIAGNOSIS — Z09 HOSPITAL DISCHARGE FOLLOW-UP: ICD-10-CM

## 2017-12-06 DIAGNOSIS — IMO0002 UNCONTROLLED TYPE 2 DIABETES MELLITUS WITH DIABETIC POLYNEUROPATHY, WITH LONG-TERM CURRENT USE OF INSULIN: ICD-10-CM

## 2017-12-06 PROBLEM — E16.0 HYPOGLYCEMIA DUE TO INSULIN: Status: RESOLVED | Noted: 2017-10-23 | Resolved: 2017-12-06

## 2017-12-06 PROBLEM — R53.1 WEAKNESS GENERALIZED: Status: RESOLVED | Noted: 2017-11-27 | Resolved: 2017-12-06

## 2017-12-06 PROBLEM — R10.84 GENERALIZED ABDOMINAL PAIN: Status: RESOLVED | Noted: 2017-09-12 | Resolved: 2017-12-06

## 2017-12-06 PROBLEM — R07.89 ATYPICAL CHEST PAIN: Status: RESOLVED | Noted: 2017-04-03 | Resolved: 2017-12-06
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
## 2017-12-06 PROBLEM — T38.3X5A HYPOGLYCEMIA DUE TO INSULIN: Status: RESOLVED | Noted: 2017-10-23 | Resolved: 2017-12-06

## 2017-12-06 PROBLEM — N30.00 ACUTE CYSTITIS WITHOUT HEMATURIA: Status: RESOLVED | Noted: 2017-11-27 | Resolved: 2017-12-06

## 2017-12-06 PROBLEM — R07.2 PRECORDIAL PAIN: Status: RESOLVED | Noted: 2017-03-11 | Resolved: 2017-12-06

## 2017-12-06 LAB
BILIRUB BLD-MCNC: ABNORMAL MG/DL
CLARITY, POC: CLEAR
COLOR UR: YELLOW
GLUCOSE UR STRIP-MCNC: NEGATIVE MG/DL
KETONES UR QL: ABNORMAL
LEUKOCYTE EST, POC: ABNORMAL
NITRITE UR-MCNC: NEGATIVE MG/ML
PH UR: 5 [PH] (ref 5–8)
PROT UR STRIP-MCNC: ABNORMAL MG/DL
RBC # UR STRIP: NEGATIVE /UL
SP GR UR: 1.03 (ref 1–1.03)
UROBILINOGEN UR QL: NORMAL

## 2017-12-06 PROCEDURE — 81002 URINALYSIS NONAUTO W/O SCOPE: CPT | Performed by: FAMILY MEDICINE

## 2017-12-06 PROCEDURE — 96372 THER/PROPH/DIAG INJ SC/IM: CPT | Performed by: FAMILY MEDICINE

## 2017-12-06 PROCEDURE — 99214 OFFICE O/P EST MOD 30 MIN: CPT | Performed by: FAMILY MEDICINE

## 2017-12-06 PROCEDURE — 87086 URINE CULTURE/COLONY COUNT: CPT | Performed by: FAMILY MEDICINE

## 2017-12-06 RX ORDER — HYDROCODONE BITARTRATE AND ACETAMINOPHEN 7.5; 325 MG/1; MG/1
1 TABLET ORAL EVERY 4 HOURS PRN
Qty: 180 TABLET | Refills: 0 | Status: SHIPPED | OUTPATIENT
Start: 2017-12-06 | End: 2018-01-03 | Stop reason: SDUPTHER

## 2017-12-06 RX ADMIN — CYANOCOBALAMIN 1000 MCG: 1000 INJECTION, SOLUTION INTRAMUSCULAR; SUBCUTANEOUS at 10:58

## 2017-12-06 NOTE — PROGRESS NOTES
Subjective   Chief Complaint   Patient presents with   • Diabetes     diabetic foot exam   • Hospital follow up   • Med Refill     hydrocodone last dose yesterday   • Urinary Tract Infection   • B12 Injection     Ariana Martinez is a 57 y.o. female.   Diabetes (diabetic foot exam); Hospital follow up; Med Refill (hydrocodone last dose yesterday); Urinary Tract Infection; and B12 Injection    History of Present Illness     Presents today for a hospital follow up from admission to [facility] for complaints of uncontrolled tremors of the lower extremities  Admitted 11/27 and discharged 11/28  Had been diagnosed by neurologist with familiar shaking  eeg indicated moderately severe peripheral neuropathy  Is currently being followed by endocrinology with [facility] Gamboa and Dr Onofre  Was started on tresiba about 6 months ago and continued with novolog  Scheduled for a follow up in February  Denies hyperglycemia, fasting blood glucose levels are average   Postprandial levels are well controlled  Having more issues with recurrent cystitis - had at least 4-5 diagnosed and treated with antibiotics  She is currently followed by Dr D'Amico and has a bladder stimulator in place    Chronic back and joint pain - controlled with norco    Due for a diabetic foot exam  Diabetic eye exam done in November    Hypotension - blood pressure currently managed with hctz, lasix, toprol    b12 deficiency - managed with monthly injections    The following portions of the patient's history were reviewed and updated as appropriate: allergies, current medications, past family history, past medical history, past social history, past surgical history and problem list.    Review of Systems   Constitutional: Negative for appetite change, chills, fatigue and fever.   HENT: Negative for congestion, ear pain, rhinorrhea and sore throat.    Eyes: Negative for pain.   Respiratory: Negative for cough and shortness of breath.    Cardiovascular:  "Negative for chest pain and palpitations.   Gastrointestinal: Negative for abdominal pain, constipation, diarrhea and nausea.   Genitourinary: Positive for dysuria.   Musculoskeletal: Positive for arthralgias and back pain. Negative for joint swelling.   Skin: Negative for rash.   Neurological: Positive for tremors. Negative for dizziness and headaches.       Objective   BP 94/62  Pulse 93  Temp 97.6 °F (36.4 °C)  Ht 172.7 cm (67.99\")  Wt 120 kg (264 lb)  SpO2 97%  BMI 40.15 kg/m2  Physical Exam   Constitutional: She is oriented to person, place, and time. She appears well-developed and well-nourished.   HENT:   Head: Normocephalic and atraumatic.   Eyes: Pupils are equal, round, and reactive to light.   Neck: Normal range of motion. Neck supple.   Cardiovascular: Normal rate, regular rhythm and normal heart sounds.    Pulmonary/Chest: Effort normal and breath sounds normal. No respiratory distress. She has no wheezes. She has no rales.   Abdominal: Soft. Bowel sounds are normal.   Musculoskeletal: Normal range of motion.    Ariana had a diabetic foot exam performed today.   During the foot exam she had a monofilament test performed (0/10 bilateral).    Neurological Sensory Findings -  Unaltered sharp/dull right ankle/foot discrimination and unaltered sharp/dull left ankle/foot discrimination. No right ankle/foot altered proprioception and no left ankle/foot altered proprioception    Vascular Status -  Her exam exhibits right foot vasculature normal. Her exam exhibits no right foot edema. Her exam exhibits left foot vasculature normal. Her exam exhibits no left foot edema.   Skin Integrity  -  She has callous right foot and right heel is dry and cracked.  She has no right foot onychomycosis, no right foot ulcer,  no ingrown toenail on right foot, no right foot warmth, no right foot blister and no right foot gangrenous changes.    Ariana 's left foot skin is intact. She has callous left foot and left heel dry " and cracked. She has no left foot onychomycosis, no left foot ulcer, no left foot warmth, no left foot blister and no left foot gangrenous changes..  Neurological: She is alert and oriented to person, place, and time.   Skin: Skin is warm and dry.   Psychiatric: She has a normal mood and affect.   Nursing note and vitals reviewed.      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, with long-term current use of insulin    Diabetic peripheral neuropathy associated with type 2 diabetes mellitus    Relevant Orders    Ambulatory Referral to Neurology (Completed)       Other    Chronic pain of multiple joints    Relevant Medications    HYDROcodone-acetaminophen (NORCO) 7.5-325 MG per tablet      Other Visit Diagnoses     Encounter for comprehensive diabetic foot examination, type 2 diabetes mellitus    -  Primary    Dysuria        Relevant Orders    Urine Culture - Urine, Urine, Clean Catch    Hospital discharge follow-up        Occasional tremors        Relevant Orders    Ambulatory Referral to Neurology (Completed)        Recommend a recheck for urology    [facility] trem[facility]    Urinalysis today - negative  Submit urine culture    Stop hctz for now    Recommended a diabetic eye exam - Dr Yusuf Ellis ([place])    diabetic foot exam done today    Recheck in February

## 2017-12-08 LAB — BACTERIA SPEC AEROBE CULT: NORMAL

## 2017-12-12 RX ORDER — INSULIN ASPART 100 [IU]/ML
INJECTION, SOLUTION INTRAVENOUS; SUBCUTANEOUS
Qty: 5 PEN | Refills: 5 | Status: SHIPPED | OUTPATIENT
Start: 2017-12-12 | End: 2018-06-22 | Stop reason: SDUPTHER

## 2017-12-14 ENCOUNTER — OFFICE VISIT (OUTPATIENT)
Dept: CARDIOLOGY | Facility: CLINIC | Age: 57
End: 2017-12-14

## 2017-12-14 VITALS
BODY MASS INDEX: 40.24 KG/M2 | WEIGHT: 265.5 LBS | SYSTOLIC BLOOD PRESSURE: 100 MMHG | HEART RATE: 88 BPM | DIASTOLIC BLOOD PRESSURE: 60 MMHG | HEIGHT: 68 IN | OXYGEN SATURATION: 94 %

## 2017-12-14 DIAGNOSIS — I50.30 (HFPEF) HEART FAILURE WITH PRESERVED EJECTION FRACTION (HCC): ICD-10-CM

## 2017-12-14 DIAGNOSIS — I95.1 ORTHOSTATIC HYPOTENSION: Primary | ICD-10-CM

## 2017-12-14 PROCEDURE — 99214 OFFICE O/P EST MOD 30 MIN: CPT | Performed by: NURSE PRACTITIONER

## 2017-12-14 RX ORDER — MECLIZINE HYDROCHLORIDE 25 MG/1
25 TABLET ORAL 3 TIMES DAILY PRN
Qty: 90 TABLET | Refills: 6 | Status: SHIPPED | OUTPATIENT
Start: 2017-12-14

## 2017-12-14 NOTE — PROGRESS NOTES
"Subjective:     orthostatic tremors (chief complaint )      History of Present Illness  HFpEF  Patient was continuing to have exertional dyspnea.  She was having class II symptoms.  I was concerned this represented HFpEF.  I referred her for RHC.  This did show elevated left sided filling pressures consistent with HFpEF.  Her current regimen includes lasix, HCTZ and Toprol-XL. Today, she tells me that her legs feel odd. They just \"melt.\" No overt pain.     ASCAD  The pt was diagnosed with CAD in 2004 and underwent PCI to LAD. She had subsequent restenosis and went for a LIMA to LAD in 2005. She  Had recurrent pain in summer of 2016. Repeat LHC revealed patent LIMA and a 60% OM1 lesion with a FFR of 0.93. She has been maintained on OMT. She is currently on ASA, Plavix, Toprol and Lipitor.      HLD  Concerning the patient's hyperlipidemia, the patient remains on a statin.  They are tolerating this very well.  Denies side effects.  Specifically, the patient denies any prior history of asymptomatic LFT elevation, myositis or myalgias.  Patient's laboratory evaluations are followed closely by their PCP.    Mild MR  Her most recent echocardiogram in April of this year showed mild MR    Ms. Martinez presents today post hospital discharge [facility]. She was admitted for weakness. CT head negative. She has had work up in past for OH which was positive.  Apparently, her DC diagnosis was \"orthostatic tremors\".     Review of Systems   Constitution: Negative for chills, decreased appetite, fever and weakness.   HENT: Negative.    Eyes: Negative.    Cardiovascular: Positive for near-syncope. Negative for chest pain, claudication, dyspnea on exertion, irregular heartbeat, leg swelling and palpitations.   Respiratory: Negative for cough, shortness of breath and wheezing.    Endocrine: Negative.    Skin: Negative for dry skin, flushing and rash.   Musculoskeletal: Negative for falls and myalgias.   Gastrointestinal: " Negative for abdominal pain, change in bowel habit and melena.   Genitourinary: Negative for frequency and hematuria.   Neurological: Negative for dizziness, light-headedness and loss of balance.   Psychiatric/Behavioral: Negative for altered mental status and memory loss. The patient is not nervous/anxious.        Current Outpatient Prescriptions   Medication Sig Dispense Refill   • ARIPiprazole (ABILIFY) 15 MG tablet TAKE 1 TABLET BY MOUTH DAILY. 30 tablet 5   • aspirin 81 MG chewable tablet Chew 81 mg daily.     • atorvastatin (LIPITOR) 40 MG tablet Take 1 tablet by mouth Every Night. 90 tablet 1   • B-D ULTRAFINE III SHORT PEN 31G X 8 MM misc USE AS DIRECTED 4 TIMES DAILY 150 each 11   • Calcium Citrate-Vitamin D (CITRACAL/VITAMIN D) 250-200 MG-UNIT tablet Take 2 tablets by mouth 2 (two) times a day.     • clopidogrel (PLAVIX) 75 MG tablet TAKE 1 TABLET BY MOUTH DAILY. 90 tablet 3   • furosemide (LASIX) 40 MG tablet Take 1 tablet by mouth Daily. 90 tablet 3   • gabapentin (NEURONTIN) 800 MG tablet Take 1 tablet by mouth 3 (Three) Times a Day. 90 tablet 2   • GLUCAGON EMERGENCY 1 MG injection USE AS DIRECTED AS NEEDED 1 kit 0   • HYDROcodone-acetaminophen (NORCO) 7.5-325 MG per tablet Take 1 tablet by mouth Every 4 (Four) Hours As Needed for Moderate Pain . 180 tablet 0   • Insulin Degludec 200 UNIT/ML solution pen-injector Inject 100 Units under the skin Daily. (Patient taking differently: Inject 100 Units under the skin Every Night.) 6 pen 11   • LORazepam (ATIVAN) 1 MG tablet Take 1 tablet by mouth 2 (Two) Times a Day As Needed for Anxiety. 60 tablet 2   • metoprolol succinate XL (TOPROL-XL) 25 MG 24 hr tablet Take 1 tablet by mouth Daily. 31 tablet 13   • mirtazapine (REMERON) 45 MG tablet TAKE 1 TABLET BY MOUTH EVERY DAY AT BEDTIME 30 tablet 5   • NOVOLOG FLEXPEN 100 UNIT/ML solution pen-injector sc pen INJECT 40 UNITS WITH MEALS 5 pen 5   • ondansetron (ZOFRAN) 8 MG tablet Take 1 tablet by mouth every 8  "(eight) hours. (Patient taking differently: Take 8 mg by mouth Every 8 (Eight) Hours As Needed.) 90 tablet 3   • pantoprazole (PROTONIX) 40 MG EC tablet TAKE 1 TABLET BY MOUTH DAILY. 90 tablet 2   • RANEXA 500 MG 12 hr tablet TAKE 2 TABLETS BY MOUTH 2 (TWO) TIMES A DAY. 120 tablet 6   • venlafaxine XR (EFFEXOR-XR) 150 MG 24 hr capsule TAKE ONE CAPSULE BY MOUTH TWICE A DAY FOR DEPRESSION 180 capsule 3   • vitamin D (ERGOCALCIFEROL) 78153 UNITS capsule capsule TAKE ONE CAPSULE BY MOUTH EVERY SUNDAY 12 capsule 2     Current Facility-Administered Medications   Medication Dose Route Frequency Provider Last Rate Last Dose   • cyanocobalamin injection 1,000 mcg  1,000 mcg Intramuscular Q28 Days Francisca Fong MD   1,000 mcg at 12/06/17 1058        Objective:     Vitals:    12/14/17 0858   BP: 100/60   BP Location: Left arm   Patient Position: Sitting   Cuff Size: Adult   Pulse: 88   SpO2: 94%   Weight: 120 kg (265 lb 8 oz)   Height: 172.7 cm (68\")          Physical Exam   Constitutional: She is oriented to person, place, and time. She appears well-developed and well-nourished. No distress.   HENT:   Head: Normocephalic.   Neck: No JVD present.   Cardiovascular: Normal rate, regular rhythm, S1 normal, S2 normal, normal heart sounds and intact distal pulses.    No murmur heard.  Pulmonary/Chest: Effort normal and breath sounds normal. No respiratory distress. She has no wheezes. She has no rales.   Abdominal: Soft. Bowel sounds are normal.   Musculoskeletal: Normal range of motion. She exhibits no edema.   Neurological: She is alert and oriented to person, place, and time.   Skin: Skin is warm and dry. No erythema.   Psychiatric: She has a normal mood and affect. Her behavior is normal. Judgment and thought content normal.       Cardiographics  Echocardiogram: 5/2/2017  Interpretation Summary   · Limited 2D echocardiogram. PAH-protocol  · Estimated EF appears to be in the range of 61 - 65%. Normal left ventricular " cavity size noted  · Right Ventricle: Normal right ventricular cavity size, wall thickness and septal motion noted  · Mildly reduced right ventricular systolic function noted. RV S' velocity is 11/2 cm/sec with a TAPSE of 1.5 with an estimated RVEF of 40-45%  · RV Strain = 15%.  · Estimated right ventricular systolic pressure from tricuspid regurgitation is normal (<35 mmHg).  · No evidence of pulmonary hypertension is present.  · There is no evidence of pericardial effusion.       ECG:    Stress Testin   CONCLUSION-            1.  Abnormal pharmacologic stress myocardial perfusion imaging study  with irreversible perfusion defect in the anterior wall from the apex  to the midsegment and a large fixed perfusion defect along the  inferior wall.            2. Normal resting left ventricular ejection traction.      3. Poor left ventricular motion noted along the septal wall but  otherwise normal.      RHC 2017  FINDINGS:   Body surface area: 2.2             Ao pressure:                131/71                         MAP: 93                         O2 sat on room air 98%     Right heart pressures:  RA:                 14/31 with a mean of 19                                    RV:                 41/10 with a mean of 16                                    PA:                  40/10 with a mean of 26                                    PCWP:            17                                            DP     Resistance:  SVR:               1517 dynes · sec/cm5              PVR: 143 dynes · sec/cm5     Saturations: N/A     Cardiac Outputs:  Thermal CO: 3.9  Thermal CI: 1.7     RV Status:  RVSW:  6.7 gm-m/beat  RVSWI:            51 gm-m/m2/beat     Medications:  1. 2% Lidocaine  2.  2 mg Zofran  3.  25 µg Fentanyl  4.  2 mg Versed     Procedure Logistics:   Fluoro: 0.4 minutes     Structural:  RA pressure is elevated. With inspiration there is adequate inspiratory decrease.  RV diastolic pressure is normal.  PA  pressure is mildly elevated.  Wedge pressure is elevated. Pressures measured in both right and left wedge positions are similiar.        IMPRESSION:   1.  Pulmonary venous hypertension  2.  Elevated left-sided filling pressures consistent with HFpEF  3.  Elevated right-sided filling pressures    Imaging  Chest x-ray:    Venous Duplex 10/15/2017  IMPRESSION:  CONCLUSION:  No evidence of deep venous thrombosis in the common  femoral, superficial femoral veins,, or popliteal veins of the  bilateral lower extremities.       Lab Review       The following portions of the patient's history were reviewed and updated as appropriate: allergies, current medications, past family history, past medical history, past social history, past surgical history and problem list.     Assessment/Plan:     HFpEF. NYHA stage C; FC-II.  Today, she is euvolemic and well-perfused.  - Toprol 25mg  - Lasix 40mg daily- WILL HOLD to see if it improves OH  No ACE/ARB due to OH    2. ASCAD. EF: 50%  last documented 11/2016. CCS class I. The patient has been revascularized with LIMA to LAD. The patient has incomplete revascularization. Anatomy with significant obstruction: circumflex.  ASA, Plavix, Atorvastatin  Toprol XL  Ranexa for CP-  WILL HOLD due to apparent side effects. If she has CP that reoccurs- will revaluate with Nuclear Stress     3. Orthostatic Hypotension  - DC HCTZ  - Not on any apparent BP lowering medications  - Will HOLD Lasix and Ranexa    -Keep a diary of events:  Blood pressure  Blood sugar  When it started. How long it lasted.  Relationship to events of your day: was I laying down? Did I just take a shower? How long was I standing for before it happened?    Can hold Lasix for a few days to see if that improved symptoms.   Weight gain of 2-3lbs overnight or 5lbs in a week is fluid building up. Will need Lasix back.     Meclizine 25mg 3 times a day as needed for Mèniére's.     4. Mild MR/TR. NYHA stage B; FC-I.   We'll plan on  echocardiogram April 2020    Keep follow up with Dr. Kwon in March. Call to be seen sooner if CP develops.

## 2017-12-14 NOTE — PATIENT INSTRUCTIONS
Keep a diary of events:  Blood pressure  Blood sugar  When it started. How long it lasted.  Relationship to events of your day: was I laying down? Did I just take a shower? How long was I standing for before it happened?    Can hold Lasix for a few days to see if that improved symptoms.   Weight gain of 2-3lbs overnight or 5lbs in a week is fluid building up. Will need Lasix back.     Meclizine 25mg 3 times a day as needed for Mèniére's.     Stop Ranexa due to side effects.

## 2017-12-27 RX ORDER — ERGOCALCIFEROL 1.25 MG/1
CAPSULE ORAL
Qty: 12 CAPSULE | Refills: 2 | Status: SHIPPED | OUTPATIENT
Start: 2017-12-27 | End: 2018-08-24 | Stop reason: SDUPTHER

## 2017-12-27 RX ORDER — GABAPENTIN 800 MG/1
TABLET ORAL
Qty: 90 TABLET | Refills: 0 | Status: CANCELLED | OUTPATIENT
Start: 2017-12-27

## 2017-12-29 ENCOUNTER — TELEPHONE (OUTPATIENT)
Dept: FAMILY MEDICINE CLINIC | Facility: CLINIC | Age: 57
End: 2017-12-29

## 2017-12-29 RX ORDER — GABAPENTIN 800 MG/1
800 TABLET ORAL 3 TIMES DAILY
Qty: 90 TABLET | Refills: 0 | Status: SHIPPED | OUTPATIENT
Start: 2017-12-29 | End: 2018-01-03 | Stop reason: SDUPTHER

## 2017-12-29 NOTE — TELEPHONE ENCOUNTER
After printing prescription I notified patient and she decided she would wait until next week. Prescription was disposed of. KALIN

## 2017-12-29 NOTE — TELEPHONE ENCOUNTER
----- Message from Zulay Escalona sent at 12/29/2017 11:26 AM CST -----  Hetal patient. Requesting gabapentin refill. She wants it called in but I think this is a controlled medication now. [phone] phone number. Told her we would call and let her know.

## 2017-12-29 NOTE — TELEPHONE ENCOUNTER
Dr. Fong out of office this week. I called the pharmacy and she doesn't have refills left. Her Brandon is current. TP

## 2017-12-29 NOTE — TELEPHONE ENCOUNTER
Sent the following note to Rosalia:  Hetal patient. Requesting gabapentin refill. She wants it called in but I think this is a controlled medication now. [phone] phone number. Told her we would call and let her know.

## 2018-01-03 DIAGNOSIS — M25.50 CHRONIC PAIN OF MULTIPLE JOINTS: ICD-10-CM

## 2018-01-03 DIAGNOSIS — G89.29 CHRONIC PAIN OF MULTIPLE JOINTS: ICD-10-CM

## 2018-01-03 RX ORDER — HYDROCODONE BITARTRATE AND ACETAMINOPHEN 7.5; 325 MG/1; MG/1
1 TABLET ORAL EVERY 4 HOURS PRN
Qty: 180 TABLET | Refills: 0 | Status: SHIPPED | OUTPATIENT
Start: 2018-01-03 | End: 2018-02-05 | Stop reason: SDUPTHER

## 2018-01-03 RX ORDER — GABAPENTIN 800 MG/1
800 TABLET ORAL 3 TIMES DAILY
Qty: 90 TABLET | Refills: 0 | Status: SHIPPED | OUTPATIENT
Start: 2018-01-03 | End: 2018-02-05 | Stop reason: SDUPTHER

## 2018-01-04 ENCOUNTER — CLINICAL SUPPORT (OUTPATIENT)
Dept: FAMILY MEDICINE CLINIC | Facility: CLINIC | Age: 58
End: 2018-01-04

## 2018-01-04 DIAGNOSIS — E53.8 B12 DEFICIENCY: ICD-10-CM

## 2018-01-04 PROCEDURE — 96372 THER/PROPH/DIAG INJ SC/IM: CPT | Performed by: FAMILY MEDICINE

## 2018-01-04 RX ADMIN — CYANOCOBALAMIN 1000 MCG: 1000 INJECTION, SOLUTION INTRAMUSCULAR; SUBCUTANEOUS at 09:26

## 2018-01-18 ENCOUNTER — APPOINTMENT (OUTPATIENT)
Dept: LAB | Facility: HOSPITAL | Age: 58
End: 2018-01-18

## 2018-01-18 LAB
25(OH)D3 SERPL-MCNC: 24.9 NG/ML (ref 30–100)
ALBUMIN SERPL-MCNC: 4.1 G/DL (ref 3.4–4.8)
ALBUMIN/GLOB SERPL: 1.4 G/DL (ref 1.1–1.8)
ALP SERPL-CCNC: 128 U/L (ref 38–126)
ALT SERPL W P-5'-P-CCNC: 33 U/L (ref 9–52)
ANION GAP SERPL CALCULATED.3IONS-SCNC: 15 MMOL/L (ref 5–15)
ARTICHOKE IGE QN: 130 MG/DL (ref 1–129)
AST SERPL-CCNC: 48 U/L (ref 14–36)
BASOPHILS # BLD AUTO: 0.03 10*3/MM3 (ref 0–0.2)
BASOPHILS NFR BLD AUTO: 0.3 % (ref 0–2)
BILIRUB SERPL-MCNC: 0.5 MG/DL (ref 0.2–1.3)
BUN BLD-MCNC: 9 MG/DL (ref 7–21)
BUN/CREAT SERPL: 13.8 (ref 7–25)
CALCIUM SPEC-SCNC: 9.6 MG/DL (ref 8.4–10.2)
CHLORIDE SERPL-SCNC: 103 MMOL/L (ref 95–110)
CHOLEST SERPL-MCNC: 223 MG/DL (ref 0–199)
CO2 SERPL-SCNC: 22 MMOL/L (ref 22–31)
CREAT BLD-MCNC: 0.65 MG/DL (ref 0.5–1)
CREAT UR-MCNC: 80.2 MG/DL
DEPRECATED RDW RBC AUTO: 43 FL (ref 36.4–46.3)
EOSINOPHIL # BLD AUTO: 0.31 10*3/MM3 (ref 0–0.7)
EOSINOPHIL NFR BLD AUTO: 2.9 % (ref 0–7)
ERYTHROCYTE [DISTWIDTH] IN BLOOD BY AUTOMATED COUNT: 14 % (ref 11.5–14.5)
GFR SERPL CREATININE-BSD FRML MDRD: 94 ML/MIN/1.73 (ref 51–120)
GLOBULIN UR ELPH-MCNC: 3 GM/DL (ref 2.3–3.5)
GLUCOSE BLD-MCNC: 287 MG/DL (ref 60–100)
HBA1C MFR BLD: 8.2 % (ref 4–5.6)
HCT VFR BLD AUTO: 39.2 % (ref 35–45)
HDLC SERPL-MCNC: 39 MG/DL (ref 60–200)
HGB BLD-MCNC: 13 G/DL (ref 12–15.5)
IMM GRANULOCYTES # BLD: 0.04 10*3/MM3 (ref 0–0.02)
IMM GRANULOCYTES NFR BLD: 0.4 % (ref 0–0.5)
LDLC/HDLC SERPL: 2.95 {RATIO} (ref 0–3.22)
LYMPHOCYTES # BLD AUTO: 1.6 10*3/MM3 (ref 0.6–4.2)
LYMPHOCYTES NFR BLD AUTO: 15 % (ref 10–50)
MCH RBC QN AUTO: 28.3 PG (ref 26.5–34)
MCHC RBC AUTO-ENTMCNC: 33.2 G/DL (ref 31.4–36)
MCV RBC AUTO: 85.4 FL (ref 80–98)
MONOCYTES # BLD AUTO: 0.46 10*3/MM3 (ref 0–0.9)
MONOCYTES NFR BLD AUTO: 4.3 % (ref 0–12)
NEUTROPHILS # BLD AUTO: 8.25 10*3/MM3 (ref 2–8.6)
NEUTROPHILS NFR BLD AUTO: 77.1 % (ref 37–80)
PLATELET # BLD AUTO: 441 10*3/MM3 (ref 150–450)
PMV BLD AUTO: 9.7 FL (ref 8–12)
POTASSIUM BLD-SCNC: 3.4 MMOL/L (ref 3.5–5.1)
PROT SERPL-MCNC: 7.1 G/DL (ref 6.3–8.6)
PROT UR-MCNC: 12.7 MG/DL
PROT/CREAT UR: 158.4 MG/G CREA (ref 0–200)
RBC # BLD AUTO: 4.59 10*6/MM3 (ref 3.77–5.16)
SODIUM BLD-SCNC: 140 MMOL/L (ref 137–145)
TRIGL SERPL-MCNC: 344 MG/DL (ref 20–199)
TSH SERPL DL<=0.05 MIU/L-ACNC: 4.99 MIU/ML (ref 0.46–4.68)
WBC NRBC COR # BLD: 10.69 10*3/MM3 (ref 3.2–9.8)

## 2018-01-18 PROCEDURE — 85025 COMPLETE CBC W/AUTO DIFF WBC: CPT | Performed by: NURSE PRACTITIONER

## 2018-01-18 PROCEDURE — 80053 COMPREHEN METABOLIC PANEL: CPT | Performed by: NURSE PRACTITIONER

## 2018-01-18 PROCEDURE — 82570 ASSAY OF URINE CREATININE: CPT | Performed by: NURSE PRACTITIONER

## 2018-01-18 PROCEDURE — 84156 ASSAY OF PROTEIN URINE: CPT | Performed by: NURSE PRACTITIONER

## 2018-01-18 PROCEDURE — 83036 HEMOGLOBIN GLYCOSYLATED A1C: CPT | Performed by: NURSE PRACTITIONER

## 2018-01-18 PROCEDURE — 84443 ASSAY THYROID STIM HORMONE: CPT | Performed by: NURSE PRACTITIONER

## 2018-01-18 PROCEDURE — 80061 LIPID PANEL: CPT | Performed by: NURSE PRACTITIONER

## 2018-01-18 PROCEDURE — 82306 VITAMIN D 25 HYDROXY: CPT | Performed by: NURSE PRACTITIONER

## 2018-01-18 PROCEDURE — 36415 COLL VENOUS BLD VENIPUNCTURE: CPT | Performed by: NURSE PRACTITIONER

## 2018-01-25 ENCOUNTER — OFFICE VISIT (OUTPATIENT)
Dept: ENDOCRINOLOGY | Facility: CLINIC | Age: 58
End: 2018-01-25

## 2018-01-25 VITALS
DIASTOLIC BLOOD PRESSURE: 78 MMHG | WEIGHT: 276 LBS | BODY MASS INDEX: 41.83 KG/M2 | HEART RATE: 82 BPM | HEIGHT: 68 IN | SYSTOLIC BLOOD PRESSURE: 136 MMHG

## 2018-01-25 DIAGNOSIS — E78.2 MIXED HYPERLIPIDEMIA: ICD-10-CM

## 2018-01-25 DIAGNOSIS — E55.9 VITAMIN D DEFICIENCY: ICD-10-CM

## 2018-01-25 DIAGNOSIS — IMO0002 UNCONTROLLED TYPE 2 DIABETES MELLITUS WITH DIABETIC POLYNEUROPATHY, WITH LONG-TERM CURRENT USE OF INSULIN: Primary | ICD-10-CM

## 2018-01-25 PROCEDURE — 99214 OFFICE O/P EST MOD 30 MIN: CPT | Performed by: NURSE PRACTITIONER

## 2018-01-25 NOTE — PROGRESS NOTES
Subjective    Ariana Martinez is a 58 y.o. female. she is here today for follow-up.    History of Present Illness       Duration/Timing: Diabetes mellitus type 2, Age at onset of diabetes: 26 years years, Onset of symptoms gradual  timing - constant     qualtiy - uncontrolled     severity - moderate        -----------------------     Severity (Complications/Hospitalizations)  Secondary Macrovascular Complications: No CAD, No CVA, No PAD  Secondary Microvascular Complications: No Diabetic Nephropathy, No Diabetic Retinopathy, Diabetic Neuropathy     Context  Diabetes Regimen: Insulin, Oral Medications           Lab Results   Component Value Date     HGBA1C 7.4 (H) 10/19/2017               Blood Glucose Readings     Now on dexcom sensor      High am -- missing tresiba           Diet  variable carb intake  Exercise: Exercises  walking     Associated Signs/Symptoms  Hyperglycemic Symptoms: No polyuria, No polydipsia, No polyphagia, No weight gain  Hypoglycemic Episodes: Documented symptomatic hypoglycemia, Seizures and syncope related to hypoglycemia               The following portions of the patient's history were reviewed and updated as appropriate:   Past Medical History:   Diagnosis Date   • Acquired hypothyroidism    • Angina, class IV    • Anxiety    • Benign paroxysmal positional vertigo    • Carpal tunnel syndrome    • Chronic pain    • Coronary atherosclerosis    • Depression    • Diabetes mellitus     Type 2, controlled   • Diabetic polyneuropathy    • Female stress incontinence    • Gastroparesis    • Hashimoto's thyroiditis    • Hyperlipidemia    • Hypertension    • Low back pain    • Malaise and fatigue    • Multiple joint pain    • Obesity     Refuses to be weighed   • Otalgia     Both   • Perforation of tympanic membrane     Left   • Postoperative wound infection    • Vitamin D deficiency      Past Surgical History:   Procedure Laterality Date   • ABDOMINAL SURGERY     • ANGIOPLASTY      coronary   •  BREAST BIOPSY Right    • CARDIAC CATHETERIZATION     • CARDIAC CATHETERIZATION N/A 2017    Procedure: Right Heart Cath;  Surgeon: Can Kwon MD PhD;  Location: [facility] INVASIVE LOCATION;  Service:    • CARPAL TUNNEL RELEASE     • CHOLECYSTECTOMY     • CORONARY ARTERY BYPASS GRAFT     • ENDOSCOPY AND COLONOSCOPY     • FOOT SURGERY      Toes   • GASTRIC BANDING      Revision, laparoscopic   • HYSTERECTOMY     • MOUTH SURGERY     • SALPINGO OOPHORECTOMY     • SHOULDER SURGERY     • TRANSESOPHAGEAL ECHOCARDIOGRAM (LAMONTE)      With color flow     Family History   Problem Relation Age of Onset   • Diabetes Other    • Heart disease Other    • Hypertension Other    • Heart disease Mother    • Stroke Mother    • Hypertension Mother    • Diabetes Sister    • Heart disease Sister    • Hypertension Sister    • Heart disease Sister    • Diabetes Sister    • Hypertension Sister    • Diabetes Sister    • Diabetes Sister    • Diabetes Sister    • Diabetes Sister      OB History      Para Term  AB Living    0 0 0 0 0 0    SAB TAB Ectopic Multiple Live Births    0 0 0 0         Current Outpatient Prescriptions   Medication Sig Dispense Refill   • ARIPiprazole (ABILIFY) 15 MG tablet TAKE 1 TABLET BY MOUTH DAILY. 30 tablet 5   • aspirin 81 MG chewable tablet Chew 81 mg daily.     • atorvastatin (LIPITOR) 40 MG tablet Take 1 tablet by mouth Every Night. 90 tablet 1   • B-D ULTRAFINE III SHORT PEN 31G X 8 MM misc USE AS DIRECTED 4 TIMES DAILY 150 each 11   • benzonatate (TESSALON) 200 MG capsule Take 1 capsule by mouth 3 (Three) Times a Day As Needed for Cough. 30 capsule 0   • Calcium Citrate-Vitamin D (CITRACAL/VITAMIN D) 250-200 MG-UNIT tablet Take 2 tablets by mouth 2 (two) times a day.     • cefuroxime (CEFTIN) 250 MG tablet Take 1 tablet by mouth 2 (Two) Times a Day for 10 days. 20 tablet 0   • clopidogrel (PLAVIX) 75 MG tablet TAKE 1 TABLET BY MOUTH DAILY. 90 tablet 3   • furosemide (LASIX) 40 MG  tablet Take 1 tablet by mouth Daily. 90 tablet 3   • gabapentin (NEURONTIN) 800 MG tablet Take 1 tablet by mouth 3 (Three) Times a Day. 90 tablet 0   • GLUCAGON EMERGENCY 1 MG injection USE AS DIRECTED AS NEEDED 1 kit 0   • HYDROcodone-acetaminophen (NORCO) 7.5-325 MG per tablet Take 1 tablet by mouth Every 4 (Four) Hours As Needed for Moderate Pain . 180 tablet 0   • Insulin Degludec 200 UNIT/ML solution pen-injector Inject 100 Units under the skin Daily. (Patient taking differently: Inject 100 Units under the skin Every Night.) 6 pen 11   • LORazepam (ATIVAN) 1 MG tablet Take 1 tablet by mouth 2 (Two) Times a Day As Needed for Anxiety. 60 tablet 2   • meclizine (ANTIVERT) 25 MG tablet Take 1 tablet by mouth 3 (Three) Times a Day As Needed for dizziness. 90 tablet 6   • metoprolol succinate XL (TOPROL-XL) 25 MG 24 hr tablet Take 1 tablet by mouth Daily. 31 tablet 13   • mirtazapine (REMERON) 45 MG tablet TAKE 1 TABLET BY MOUTH EVERY DAY AT BEDTIME 30 tablet 5   • nitrofurantoin, macrocrystal-monohydrate, (MACROBID) 100 MG capsule Take 1 capsule by mouth Every 12 (Twelve) Hours. 14 capsule 0   • NOVOLOG FLEXPEN 100 UNIT/ML solution pen-injector sc pen INJECT 40 UNITS WITH MEALS 5 pen 5   • ondansetron (ZOFRAN) 8 MG tablet Take 1 tablet by mouth every 8 (eight) hours. (Patient taking differently: Take 8 mg by mouth Every 8 (Eight) Hours As Needed.) 90 tablet 3   • pantoprazole (PROTONIX) 40 MG EC tablet TAKE 1 TABLET BY MOUTH DAILY. 90 tablet 2   • phenazopyridine (PYRIDIUM) 100 MG tablet Take 1 tablet by mouth 3 (Three) Times a Day As Needed for bladder spasms. 9 tablet 0   • promethazine-dextromethorphan (PROMETHAZINE-DM) 6.25-15 MG/5ML syrup Take 5 mL by mouth At Night As Needed for Cough. 120 mL 0   • RANEXA 500 MG 12 hr tablet TAKE 2 TABLETS BY MOUTH 2 (TWO) TIMES A DAY. 120 tablet 6   • venlafaxine XR (EFFEXOR-XR) 150 MG 24 hr capsule TAKE ONE CAPSULE BY MOUTH TWICE A DAY FOR DEPRESSION 180 capsule 3   •  vitamin D (ERGOCALCIFEROL) 74402 units capsule capsule TAKE ONE CAPSULE BY MOUTH EVERY SUNDAY 12 capsule 2     Current Facility-Administered Medications   Medication Dose Route Frequency Provider Last Rate Last Dose   • cyanocobalamin injection 1,000 mcg  1,000 mcg Intramuscular Q28 Days Francisca Fong MD   1,000 mcg at 01/04/18 0926     Allergies   Allergen Reactions   • Seroquel [Quetiapine Fumarate] Anaphylaxis   • Avandia [Rosiglitazone] Swelling   • Morphine And Related Hallucinations     If patient takes too much     Social History     Social History   • Marital status:      Spouse name: N/A   • Number of children: N/A   • Years of education: N/A     Social History Main Topics   • Smoking status: Never Smoker   • Smokeless tobacco: Never Used   • Alcohol use No   • Drug use: No   • Sexual activity: Defer      Comment:      Other Topics Concern   • None     Social History Narrative       Review of Systems  Review of Systems   Constitutional: Negative for activity change, appetite change, diaphoresis and fatigue.   HENT: Negative for congestion, facial swelling, sneezing, sore throat, tinnitus, trouble swallowing and voice change.    Eyes: Negative for photophobia, pain, discharge, redness, itching and visual disturbance.   Respiratory: Negative for apnea, cough, choking, chest tightness and shortness of breath.    Cardiovascular: Negative for chest pain, palpitations and leg swelling.   Gastrointestinal: Negative for abdominal distention, abdominal pain, constipation, diarrhea, nausea and vomiting.   Endocrine: Negative for cold intolerance, heat intolerance, polydipsia, polyphagia and polyuria.   Genitourinary: Negative for difficulty urinating, dysuria, frequency, hematuria and urgency.   Musculoskeletal: Negative for arthralgias, back pain, gait problem, joint swelling, myalgias, neck pain and neck stiffness.   Skin: Negative for color change, pallor, rash and wound.  "  Allergic/Immunologic: Negative for environmental allergies.   Neurological: Negative for dizziness, tremors, weakness, light-headedness, numbness and headaches.   Hematological: Negative for adenopathy. Does not bruise/bleed easily.   Psychiatric/Behavioral: Negative for agitation, behavioral problems, confusion and sleep disturbance.        Objective    /78 (BP Location: Left arm, Patient Position: Sitting, Cuff Size: Adult)  Pulse 82  Ht 172.7 cm (68\")  Wt 125 kg (276 lb)  BMI 41.97 kg/m2  Physical Exam   Constitutional: She is oriented to person, place, and time. She appears well-developed and well-nourished. No distress.   HENT:   Head: Normocephalic and atraumatic.   Right Ear: External ear normal.   Left Ear: External ear normal.   Nose: Nose normal.   Eyes: Conjunctivae and EOM are normal. Pupils are equal, round, and reactive to light.   Neck: Normal range of motion. Neck supple. No tracheal deviation present. No thyromegaly present.   Cardiovascular: Normal rate, regular rhythm and normal heart sounds.    No murmur heard.  Pulmonary/Chest: Effort normal and breath sounds normal. No respiratory distress. She has no wheezes.   Abdominal: Soft. Bowel sounds are normal. There is no tenderness. There is no rebound and no guarding.   Musculoskeletal: Normal range of motion. She exhibits no edema, tenderness or deformity.   Neurological: She is alert and oriented to person, place, and time. No cranial nerve deficit.   Skin: Skin is warm and dry. No rash noted.   Psychiatric: She has a normal mood and affect. Her behavior is normal. Judgment and thought content normal.       Lab Review  Glucose (mg/dL)   Date Value   01/18/2018 287 (H)   11/27/2017 151 (H)   10/19/2017 134 (H)     Glucose, Arterial (mmol/L)   Date Value   10/14/2017 155     Sodium (mmol/L)   Date Value   01/18/2018 140   11/27/2017 139   10/19/2017 144     Potassium (mmol/L)   Date Value   01/18/2018 3.4 (L)   11/27/2017 4.2 "   10/19/2017 4.0     Chloride (mmol/L)   Date Value   01/18/2018 103   11/27/2017 98   10/19/2017 101     CO2 (mmol/L)   Date Value   01/18/2018 22.0   11/27/2017 32.0 (H)   10/19/2017 31.0     BUN (mg/dL)   Date Value   01/18/2018 9   11/27/2017 14   10/19/2017 15     Creatinine (mg/dL)   Date Value   01/18/2018 0.65   11/27/2017 1.33 (H)   10/19/2017 1.17 (H)     Hemoglobin A1C (%)   Date Value   01/18/2018 8.2 (H)   10/19/2017 7.4 (H)   08/27/2017 7.3 (H)     Triglycerides (mg/dL)   Date Value   01/18/2018 344 (H)   07/20/2017 389 (H)   03/12/2017 290 (H)       Assessment/Plan      1. Uncontrolled type 2 diabetes mellitus with diabetic polyneuropathy, with long-term current use of insulin    2. Mixed hyperlipidemia    3. Vitamin D deficiency    .    Medications prescribed:  Outpatient Encounter Prescriptions as of 1/25/2018   Medication Sig Dispense Refill   • ARIPiprazole (ABILIFY) 15 MG tablet TAKE 1 TABLET BY MOUTH DAILY. 30 tablet 5   • aspirin 81 MG chewable tablet Chew 81 mg daily.     • atorvastatin (LIPITOR) 40 MG tablet Take 1 tablet by mouth Every Night. 90 tablet 1   • B-D ULTRAFINE III SHORT PEN 31G X 8 MM misc USE AS DIRECTED 4 TIMES DAILY 150 each 11   • benzonatate (TESSALON) 200 MG capsule Take 1 capsule by mouth 3 (Three) Times a Day As Needed for Cough. 30 capsule 0   • Calcium Citrate-Vitamin D (CITRACAL/VITAMIN D) 250-200 MG-UNIT tablet Take 2 tablets by mouth 2 (two) times a day.     • cefuroxime (CEFTIN) 250 MG tablet Take 1 tablet by mouth 2 (Two) Times a Day for 10 days. 20 tablet 0   • clopidogrel (PLAVIX) 75 MG tablet TAKE 1 TABLET BY MOUTH DAILY. 90 tablet 3   • furosemide (LASIX) 40 MG tablet Take 1 tablet by mouth Daily. 90 tablet 3   • gabapentin (NEURONTIN) 800 MG tablet Take 1 tablet by mouth 3 (Three) Times a Day. 90 tablet 0   • GLUCAGON EMERGENCY 1 MG injection USE AS DIRECTED AS NEEDED 1 kit 0   • HYDROcodone-acetaminophen (NORCO) 7.5-325 MG per tablet Take 1 tablet by mouth  Every 4 (Four) Hours As Needed for Moderate Pain . 180 tablet 0   • Insulin Degludec 200 UNIT/ML solution pen-injector Inject 100 Units under the skin Daily. (Patient taking differently: Inject 100 Units under the skin Every Night.) 6 pen 11   • LORazepam (ATIVAN) 1 MG tablet Take 1 tablet by mouth 2 (Two) Times a Day As Needed for Anxiety. 60 tablet 2   • meclizine (ANTIVERT) 25 MG tablet Take 1 tablet by mouth 3 (Three) Times a Day As Needed for dizziness. 90 tablet 6   • metoprolol succinate XL (TOPROL-XL) 25 MG 24 hr tablet Take 1 tablet by mouth Daily. 31 tablet 13   • mirtazapine (REMERON) 45 MG tablet TAKE 1 TABLET BY MOUTH EVERY DAY AT BEDTIME 30 tablet 5   • nitrofurantoin, macrocrystal-monohydrate, (MACROBID) 100 MG capsule Take 1 capsule by mouth Every 12 (Twelve) Hours. 14 capsule 0   • NOVOLOG FLEXPEN 100 UNIT/ML solution pen-injector sc pen INJECT 40 UNITS WITH MEALS 5 pen 5   • ondansetron (ZOFRAN) 8 MG tablet Take 1 tablet by mouth every 8 (eight) hours. (Patient taking differently: Take 8 mg by mouth Every 8 (Eight) Hours As Needed.) 90 tablet 3   • pantoprazole (PROTONIX) 40 MG EC tablet TAKE 1 TABLET BY MOUTH DAILY. 90 tablet 2   • phenazopyridine (PYRIDIUM) 100 MG tablet Take 1 tablet by mouth 3 (Three) Times a Day As Needed for bladder spasms. 9 tablet 0   • promethazine-dextromethorphan (PROMETHAZINE-DM) 6.25-15 MG/5ML syrup Take 5 mL by mouth At Night As Needed for Cough. 120 mL 0   • RANEXA 500 MG 12 hr tablet TAKE 2 TABLETS BY MOUTH 2 (TWO) TIMES A DAY. 120 tablet 6   • venlafaxine XR (EFFEXOR-XR) 150 MG 24 hr capsule TAKE ONE CAPSULE BY MOUTH TWICE A DAY FOR DEPRESSION 180 capsule 3   • vitamin D (ERGOCALCIFEROL) 50688 units capsule capsule TAKE ONE CAPSULE BY MOUTH EVERY SUNDAY 12 capsule 2     Facility-Administered Encounter Medications as of 1/25/2018   Medication Dose Route Frequency Provider Last Rate Last Dose   • cyanocobalamin injection 1,000 mcg  1,000 mcg Intramuscular Q28 Days  Francisca Fong MD   1,000 mcg at 01/04/18 0926       Orders placed during this encounter include:  Orders Placed This Encounter   Procedures   • Hemoglobin A1c   • Lipid Panel   • Comprehensive Metabolic Panel   • Vitamin D 25 Hydroxy   • TSH   • CBC & Differential     Order Specific Question:   Manual Differential     Answer:   No     Glycemic Management        Lab Results   Component Value Date    HGBA1C 8.2 (H) 01/18/2018       dexcom sensor            Tresiba -- taking 120 units s       ==================        Novolog      Taking 60 units      Discussed carb counting but states cannot     She will need to look at her meals because 30 units may not be enough for some meals and for others meals to strong     If you eat a meal and give 30 units and your sugar is 200 or higher before the next meal look back at that meal and the next time you eat it either give more insulin or eat less     Also if you have a low after the meal give less insulin the next time you eat that meal                  ==================     Jardiance 10 mg tablet , take before breakfast---will stop due to dizziness for possible volume depletion-----the dizziness has resolved since stopping jardiance        ==================     Metformin 500 mg tablets - caused diarrhea --stopped      ====================     start bydureon - stopped    Wants to try victoza -- start 0.6 mg one week  Warned of the gastric side effects she does have gastroparesis but she wants to try     Gave a sample she will let me know if she wants an RX called in     If vomiting or abdominal pain stop               januvia 100 mg daily -- stopped --      ------------------------------     Lipid Management        on Lipitor   on fenofibrate     August 2015     LDL - 122     missed some doses of medication      Feb. 2016     Tg - 241  LDL - 102     missing doses still - please be complaint     May 2016     LDL - 85      Jan. 2017     LDL - 120     Has been taking  medication faithfully      LDL needs to be 70 or less     add zetia     Also discussed restarting the jardiance for the heart benefits but refuses           Total Cholesterol   Date Value Ref Range Status   01/18/2018 223 (H) 0 - 199 mg/dL Final     Triglycerides   Date Value Ref Range Status   01/18/2018 344 (H) 20 - 199 mg/dL Final     HDL Cholesterol   Date Value Ref Range Status   01/18/2018 39 (L) 60 - 200 mg/dL Final     LDL Cholesterol    Date Value Ref Range Status   01/18/2018 130 (H) 1 - 129 mg/dL Final           Blood Pressure Management: Control of blood pressure  on ACEi     stopped the lasix   Microvascular Complication Monitoring: Neuropathy type sensorial  neurontin     intermittetly takes reglan for gastroparesis     states eye exam ws 2015  RX for shoes - diabetic neuropathy      Immunization - last influenza vaccine Oct. 2016       Other Diabetes Related Aspects    TSH abnormal from July to Sept 2014     TSH in the 5 to 7 range     confirmed now Jan 2015     start levothyroxine 50 mcgs daily, skip on Sunday     now TSH nl      stopped levothyroxine      TSH - nl     will monitor TSH         August 2015     TSH - nl     FEb. 2016     TSh - 6     she refuses thyroid medicaton      May 2016     TSH - 5     Jan. 2017      TSH - nl           Lab Results   Component Value Date     TSH 5.510 (H) 10/19/2017          does not want medication     Lab Results   Component Value Date    TSH 4.990 (H) 01/18/2018     Still does not want any medications      ---     3-15     B12 low      now b12 shots     June 2015     B12- 968     Vitamin d def        Vitamin d - 26      Continue vitamin d supplement         Referral to GI      Reason - diarrhea chronic      History of gastroparesis         4. Follow-up: Return in about 3 months (around 4/25/2018) for Recheck.

## 2018-01-30 RX ORDER — GABAPENTIN 800 MG/1
TABLET ORAL
Qty: 90 TABLET | Refills: 0 | OUTPATIENT
Start: 2018-01-30

## 2018-02-05 ENCOUNTER — OFFICE VISIT (OUTPATIENT)
Dept: FAMILY MEDICINE CLINIC | Facility: CLINIC | Age: 58
End: 2018-02-05

## 2018-02-05 VITALS
HEART RATE: 70 BPM | SYSTOLIC BLOOD PRESSURE: 132 MMHG | WEIGHT: 266 LBS | DIASTOLIC BLOOD PRESSURE: 78 MMHG | TEMPERATURE: 97 F | OXYGEN SATURATION: 97 % | HEIGHT: 68 IN | BODY MASS INDEX: 40.32 KG/M2

## 2018-02-05 DIAGNOSIS — M25.512 ACUTE PAIN OF LEFT SHOULDER: ICD-10-CM

## 2018-02-05 DIAGNOSIS — IMO0001 CLASS 3 OBESITY DUE TO EXCESS CALORIES WITHOUT SERIOUS COMORBIDITY WITH BODY MASS INDEX (BMI) OF 40.0 TO 44.9 IN ADULT: ICD-10-CM

## 2018-02-05 DIAGNOSIS — M25.50 CHRONIC PAIN OF MULTIPLE JOINTS: Primary | ICD-10-CM

## 2018-02-05 DIAGNOSIS — G89.29 CHRONIC PAIN OF MULTIPLE JOINTS: Primary | ICD-10-CM

## 2018-02-05 DIAGNOSIS — F41.1 GAD (GENERALIZED ANXIETY DISORDER): ICD-10-CM

## 2018-02-05 DIAGNOSIS — E11.42 DIABETIC PERIPHERAL NEUROPATHY ASSOCIATED WITH TYPE 2 DIABETES MELLITUS (HCC): ICD-10-CM

## 2018-02-05 DIAGNOSIS — E53.8 B12 DEFICIENCY: ICD-10-CM

## 2018-02-05 PROBLEM — I95.2 HYPOTENSION DUE TO DRUGS: Status: RESOLVED | Noted: 2017-12-06 | Resolved: 2018-02-05

## 2018-02-05 PROBLEM — E66.01 MORBID OBESITY DUE TO EXCESS CALORIES: Status: RESOLVED | Noted: 2017-08-08 | Resolved: 2018-02-05

## 2018-02-05 PROCEDURE — 96372 THER/PROPH/DIAG INJ SC/IM: CPT | Performed by: FAMILY MEDICINE

## 2018-02-05 PROCEDURE — 99214 OFFICE O/P EST MOD 30 MIN: CPT | Performed by: FAMILY MEDICINE

## 2018-02-05 RX ORDER — HYDROCODONE BITARTRATE AND ACETAMINOPHEN 7.5; 325 MG/1; MG/1
1 TABLET ORAL EVERY 4 HOURS PRN
Qty: 180 TABLET | Refills: 0 | Status: SHIPPED | OUTPATIENT
Start: 2018-02-05 | End: 2018-03-07 | Stop reason: SDUPTHER

## 2018-02-05 RX ORDER — GABAPENTIN 800 MG/1
800 TABLET ORAL 3 TIMES DAILY
Qty: 90 TABLET | Refills: 5 | Status: ON HOLD | OUTPATIENT
Start: 2018-02-05 | End: 2018-07-23

## 2018-02-05 RX ORDER — LORAZEPAM 1 MG/1
1 TABLET ORAL 2 TIMES DAILY PRN
Qty: 60 TABLET | Refills: 2 | Status: SHIPPED | OUTPATIENT
Start: 2018-02-05 | End: 2018-05-04 | Stop reason: SDUPTHER

## 2018-02-05 RX ORDER — MIRTAZAPINE 45 MG/1
TABLET, FILM COATED ORAL
Qty: 30 TABLET | Refills: 0 | Status: SHIPPED | OUTPATIENT
Start: 2018-02-05 | End: 2018-02-05 | Stop reason: SDUPTHER

## 2018-02-05 RX ORDER — MIRTAZAPINE 45 MG/1
45 TABLET, FILM COATED ORAL NIGHTLY
Qty: 90 TABLET | Refills: 1 | Status: SHIPPED | OUTPATIENT
Start: 2018-02-05 | End: 2018-07-31 | Stop reason: SDUPTHER

## 2018-02-05 RX ADMIN — CYANOCOBALAMIN 1000 MCG: 1000 INJECTION, SOLUTION INTRAMUSCULAR; SUBCUTANEOUS at 08:16

## 2018-02-05 NOTE — TELEPHONE ENCOUNTER
----- Message from Francisca Fong MD sent at 2/5/2018 11:02 AM CST -----  Degenerative arthritis changes with bone spurring.

## 2018-02-05 NOTE — PROGRESS NOTES
Subjective   Chief Complaint   Patient presents with   • Follow-up     3 month    • Injections     b12   • Med Refill     Ariana Martinez is a 58 y.o. female.   Follow-up (3 month ); Injections (b12); and Med Refill    History of Present Illness     Presents for a 3 month follow up and medication refills    MAURICIO - controlled with ativan PRN  Due for a refill    Obesity - patient has been counseled by numerous providers on the significance of weight loss    Diabetic peripheral neuropathy - confirmed by EMG with Dr Mansfield  Pain managed with gabapentin    Diabetes - controlled   Average readings of FBG levels are 100-140  Currently prescribed novolog, tresiba  Has previously failed metformin, bydureon, jardiance  januvia was too expensive  Reviewed notes from Dr Kingston Tafoya's office on previous medications used    Chronic back and joint pain - controlled with norco    Hypertension - blood pressure currently managed with hctz, lasix, toprol    b12 deficiency - managed with monthly injections    Complaining of new onset of left shoulder pain  Complaining of popping and clicking with joint use, decreased range of motion  No images done for this shoulder  History of right shoulder arthroscopy    The following portions of the patient's history were reviewed and updated as appropriate: allergies, current medications, past family history, past medical history, past social history, past surgical history and problem list.    Review of Systems   Constitutional: Negative for appetite change, chills, fatigue and fever.   HENT: Negative for congestion, ear pain, rhinorrhea and sore throat.    Eyes: Negative for pain.   Respiratory: Negative for cough and shortness of breath.    Cardiovascular: Negative for chest pain and palpitations.   Gastrointestinal: Negative for abdominal pain, constipation, diarrhea and nausea.   Genitourinary: Negative for dysuria.   Musculoskeletal: Positive for arthralgias. Negative for back pain,  "joint swelling and neck pain.   Skin: Negative for rash.   Neurological: Negative for dizziness and headaches.       Objective   /78  Pulse 70  Temp 97 °F (36.1 °C)  Ht 172.7 cm (67.99\")  Wt 121 kg (266 lb)  SpO2 97%  BMI 40.45 kg/m2  Physical Exam   Constitutional: She is oriented to person, place, and time. She appears well-developed and well-nourished.   HENT:   Head: Normocephalic and atraumatic.   Eyes: Pupils are equal, round, and reactive to light.   Neck: Normal range of motion. Neck supple.   Cardiovascular: Normal rate, regular rhythm and normal heart sounds.    Pulmonary/Chest: Effort normal and breath sounds normal. No respiratory distress. She has no wheezes. She has no rales.   Abdominal: Soft. Bowel sounds are normal.   Central obesity   Musculoskeletal:        Left shoulder: She exhibits decreased range of motion, bony tenderness, pain and decreased strength.   Neurological: She is alert and oriented to person, place, and time.   Skin: Skin is warm and dry.   Psychiatric: She has a normal mood and affect.   Nursing note and vitals reviewed.      Assessment/Plan   Problems Addressed this Visit        Digestive    B12 deficiency       Endocrine    Diabetic peripheral neuropathy associated with type 2 diabetes mellitus       Other    MAURICIO (generalized anxiety disorder)    Relevant Medications    mirtazapine (REMERON) 45 MG tablet    LORazepam (ATIVAN) 1 MG tablet    Chronic pain of multiple joints - Primary    Relevant Medications    HYDROcodone-acetaminophen (NORCO) 7.5-325 MG per tablet    Class 3 obesity due to excess calories without serious comorbidity with body mass index (BMI) of 40.0 to 44.9 in adult      Other Visit Diagnoses     Acute pain of left shoulder        Relevant Orders    XR Shoulder 2+ View Left        Shoulder xray ordered    Refilled neurontin, ativan, neurontin  uds up to date  perla up to date    b12 IM today    Recommended diet, exercise and weight loss    Refilled " medications    Recheck in 3 months

## 2018-02-24 ENCOUNTER — HOSPITAL ENCOUNTER (EMERGENCY)
Facility: HOSPITAL | Age: 58
Discharge: HOME | End: 2018-02-24
Attending: FAMILY MEDICINE | Admitting: FAMILY MEDICINE

## 2018-02-24 VITALS
WEIGHT: 245 LBS | DIASTOLIC BLOOD PRESSURE: 69 MMHG | SYSTOLIC BLOOD PRESSURE: 101 MMHG | BODY MASS INDEX: 37.13 KG/M2 | HEIGHT: 68 IN | HEART RATE: 85 BPM | RESPIRATION RATE: 18 BRPM | TEMPERATURE: 97.7 F | OXYGEN SATURATION: 95 %

## 2018-02-24 DIAGNOSIS — K31.84 GASTROPARESIS: Primary | ICD-10-CM

## 2018-02-24 LAB
ALBUMIN SERPL-MCNC: 3.9 G/DL (ref 3.4–4.8)
ALBUMIN/GLOB SERPL: 1.1 G/DL (ref 1.1–1.8)
ALP SERPL-CCNC: 127 U/L (ref 38–126)
ALT SERPL W P-5'-P-CCNC: 42 U/L (ref 9–52)
ANION GAP SERPL CALCULATED.3IONS-SCNC: 13 MMOL/L (ref 5–15)
AST SERPL-CCNC: 32 U/L (ref 14–36)
BASOPHILS # BLD AUTO: 0.04 10*3/MM3 (ref 0–0.2)
BASOPHILS NFR BLD AUTO: 0.3 % (ref 0–2)
BILIRUB SERPL-MCNC: 0.3 MG/DL (ref 0.2–1.3)
BUN BLD-MCNC: 13 MG/DL (ref 7–21)
BUN/CREAT SERPL: 14.8 (ref 7–25)
CALCIUM SPEC-SCNC: 9.3 MG/DL (ref 8.4–10.2)
CHLORIDE SERPL-SCNC: 99 MMOL/L (ref 95–110)
CK MB SERPL-CCNC: 1 NG/ML (ref 0–5)
CK SERPL-CCNC: 38 U/L (ref 30–135)
CO2 SERPL-SCNC: 29 MMOL/L (ref 22–31)
CREAT BLD-MCNC: 0.88 MG/DL (ref 0.5–1)
CRP SERPL-MCNC: 0.6 MG/DL (ref 0–1)
DEPRECATED RDW RBC AUTO: 41.9 FL (ref 36.4–46.3)
EOSINOPHIL # BLD AUTO: 0.31 10*3/MM3 (ref 0–0.7)
EOSINOPHIL NFR BLD AUTO: 2.5 % (ref 0–7)
ERYTHROCYTE [DISTWIDTH] IN BLOOD BY AUTOMATED COUNT: 13.8 % (ref 11.5–14.5)
GFR SERPL CREATININE-BSD FRML MDRD: 66 ML/MIN/1.73 (ref 51–120)
GLOBULIN UR ELPH-MCNC: 3.4 GM/DL (ref 2.3–3.5)
GLUCOSE BLD-MCNC: 72 MG/DL (ref 60–100)
HCT VFR BLD AUTO: 42.8 % (ref 35–45)
HGB BLD-MCNC: 14.4 G/DL (ref 12–15.5)
HOLD SPECIMEN: NORMAL
IMM GRANULOCYTES # BLD: 0.04 10*3/MM3 (ref 0–0.02)
IMM GRANULOCYTES NFR BLD: 0.3 % (ref 0–0.5)
LIPASE SERPL-CCNC: 20 U/L (ref 23–300)
LYMPHOCYTES # BLD AUTO: 2.42 10*3/MM3 (ref 0.6–4.2)
LYMPHOCYTES NFR BLD AUTO: 19.6 % (ref 10–50)
MAGNESIUM SERPL-MCNC: 1.9 MG/DL (ref 1.6–2.3)
MCH RBC QN AUTO: 28.5 PG (ref 26.5–34)
MCHC RBC AUTO-ENTMCNC: 33.6 G/DL (ref 31.4–36)
MCV RBC AUTO: 84.8 FL (ref 80–98)
MONOCYTES # BLD AUTO: 1 10*3/MM3 (ref 0–0.9)
MONOCYTES NFR BLD AUTO: 8.1 % (ref 0–12)
NEUTROPHILS # BLD AUTO: 8.51 10*3/MM3 (ref 2–8.6)
NEUTROPHILS NFR BLD AUTO: 69.2 % (ref 37–80)
PLATELET # BLD AUTO: 416 10*3/MM3 (ref 150–450)
PMV BLD AUTO: 9.7 FL (ref 8–12)
POTASSIUM BLD-SCNC: 3.2 MMOL/L (ref 3.5–5.1)
PROT SERPL-MCNC: 7.3 G/DL (ref 6.3–8.6)
RBC # BLD AUTO: 5.05 10*6/MM3 (ref 3.77–5.16)
SODIUM BLD-SCNC: 141 MMOL/L (ref 137–145)
TROPONIN I SERPL-MCNC: <0.012 NG/ML
WBC NRBC COR # BLD: 12.32 10*3/MM3 (ref 3.2–9.8)
WHOLE BLOOD HOLD SPECIMEN: NORMAL
WHOLE BLOOD HOLD SPECIMEN: NORMAL

## 2018-02-24 PROCEDURE — 86140 C-REACTIVE PROTEIN: CPT | Performed by: FAMILY MEDICINE

## 2018-02-24 PROCEDURE — 25010000002 METOCLOPRAMIDE PER 10 MG: Performed by: FAMILY MEDICINE

## 2018-02-24 PROCEDURE — 83735 ASSAY OF MAGNESIUM: CPT | Performed by: FAMILY MEDICINE

## 2018-02-24 PROCEDURE — 96376 TX/PRO/DX INJ SAME DRUG ADON: CPT

## 2018-02-24 PROCEDURE — 93005 ELECTROCARDIOGRAM TRACING: CPT | Performed by: FAMILY MEDICINE

## 2018-02-24 PROCEDURE — 96374 THER/PROPH/DIAG INJ IV PUSH: CPT

## 2018-02-24 PROCEDURE — 96361 HYDRATE IV INFUSION ADD-ON: CPT

## 2018-02-24 PROCEDURE — 36415 COLL VENOUS BLD VENIPUNCTURE: CPT

## 2018-02-24 PROCEDURE — 80053 COMPREHEN METABOLIC PANEL: CPT | Performed by: FAMILY MEDICINE

## 2018-02-24 PROCEDURE — 85025 COMPLETE CBC W/AUTO DIFF WBC: CPT | Performed by: FAMILY MEDICINE

## 2018-02-24 PROCEDURE — 82553 CREATINE MB FRACTION: CPT | Performed by: FAMILY MEDICINE

## 2018-02-24 PROCEDURE — 93010 ELECTROCARDIOGRAM REPORT: CPT | Performed by: INTERNAL MEDICINE

## 2018-02-24 PROCEDURE — 99284 EMERGENCY DEPT VISIT MOD MDM: CPT

## 2018-02-24 PROCEDURE — 84484 ASSAY OF TROPONIN QUANT: CPT | Performed by: FAMILY MEDICINE

## 2018-02-24 PROCEDURE — 83690 ASSAY OF LIPASE: CPT | Performed by: FAMILY MEDICINE

## 2018-02-24 PROCEDURE — 82550 ASSAY OF CK (CPK): CPT | Performed by: FAMILY MEDICINE

## 2018-02-24 PROCEDURE — 96375 TX/PRO/DX INJ NEW DRUG ADDON: CPT

## 2018-02-24 RX ORDER — MEPERIDINE HYDROCHLORIDE 50 MG/ML
25 INJECTION INTRAMUSCULAR; INTRAVENOUS; SUBCUTANEOUS ONCE
Status: COMPLETED | OUTPATIENT
Start: 2018-02-24 | End: 2018-02-24

## 2018-02-24 RX ORDER — SODIUM CHLORIDE 0.9 % (FLUSH) 0.9 %
10 SYRINGE (ML) INJECTION AS NEEDED
Status: DISCONTINUED | OUTPATIENT
Start: 2018-02-24 | End: 2018-02-24 | Stop reason: HOSPADM

## 2018-02-24 RX ORDER — MEPERIDINE HYDROCHLORIDE 50 MG/ML
12.5 INJECTION INTRAMUSCULAR; INTRAVENOUS; SUBCUTANEOUS ONCE
Status: COMPLETED | OUTPATIENT
Start: 2018-02-24 | End: 2018-02-24

## 2018-02-24 RX ORDER — METOCLOPRAMIDE HYDROCHLORIDE 5 MG/ML
10 INJECTION INTRAMUSCULAR; INTRAVENOUS ONCE
Status: COMPLETED | OUTPATIENT
Start: 2018-02-24 | End: 2018-02-24

## 2018-02-24 RX ORDER — METOCLOPRAMIDE 10 MG/1
10 TABLET ORAL
Qty: 30 TABLET | Refills: 0 | Status: SHIPPED | OUTPATIENT
Start: 2018-02-24 | End: 2019-01-11

## 2018-02-24 RX ADMIN — MEPERIDINE HYDROCHLORIDE 25 MG: 50 INJECTION, SOLUTION INTRAMUSCULAR; INTRAVENOUS; SUBCUTANEOUS at 12:55

## 2018-02-24 RX ADMIN — SODIUM CHLORIDE 1000 ML: 9 INJECTION, SOLUTION INTRAVENOUS at 12:54

## 2018-02-24 RX ADMIN — METOCLOPRAMIDE 10 MG: 5 INJECTION, SOLUTION INTRAMUSCULAR; INTRAVENOUS at 12:57

## 2018-02-24 RX ADMIN — MEPERIDINE HYDROCHLORIDE 12.5 MG: 50 INJECTION, SOLUTION INTRAMUSCULAR; INTRAVENOUS; SUBCUTANEOUS at 14:41

## 2018-02-24 NOTE — ED PROVIDER NOTES
Subjective   Patient is a 58 y.o. female presenting with abdominal pain.   History provided by:  Patient  Abdominal Pain   Pain location:  Epigastric  Pain quality: aching and stabbing    Pain radiates to:  Does not radiate  Pain severity:  Moderate  Onset quality:  Sudden  Duration:  3 days  Timing:  Constant  Progression:  Worsening  Chronicity:  Recurrent  Context: not eating, not retching, not suspicious food intake and not trauma    Relieved by:  Nothing  Associated symptoms: chest pain and nausea    Associated symptoms: no belching, no dysuria, no fever, no hematemesis and no vomiting    Risk factors: obesity        Review of Systems   Constitutional: Negative.  Negative for fever.   HENT: Negative.    Respiratory: Negative.    Cardiovascular: Positive for chest pain.   Gastrointestinal: Positive for abdominal pain and nausea. Negative for hematemesis and vomiting.   Endocrine: Negative.    Genitourinary: Negative.  Negative for dysuria.   Skin: Negative.    Neurological: Negative.    Psychiatric/Behavioral: Negative.    All other systems reviewed and are negative.      Past Medical History:   Diagnosis Date   • Acquired hypothyroidism    • Angina, class IV    • Anxiety    • Benign paroxysmal positional vertigo    • Carpal tunnel syndrome    • Chronic pain    • Coronary atherosclerosis    • Depression    • Diabetes mellitus     Type 2, controlled   • Diabetic polyneuropathy    • Female stress incontinence    • Gastroparesis    • Hashimoto's thyroiditis    • Hyperlipidemia    • Hypertension    • Low back pain    • Malaise and fatigue    • Multiple joint pain    • Obesity     Refuses to be weighed   • Otalgia     Both   • Perforation of tympanic membrane     Left   • Postoperative wound infection    • Vitamin D deficiency        Allergies   Allergen Reactions   • Seroquel [Quetiapine Fumarate] Anaphylaxis   • Avandia [Rosiglitazone] Swelling   • Morphine And Related Hallucinations     If patient takes too much        Past Surgical History:   Procedure Laterality Date   • ABDOMINAL SURGERY     • ANGIOPLASTY      coronary   • BREAST BIOPSY Right    • CARDIAC CATHETERIZATION     • CARDIAC CATHETERIZATION N/A 6/20/2017    Procedure: Right Heart Cath;  Surgeon: Can Kwon MD PhD;  Location: [facility] INVASIVE LOCATION;  Service:    • CARPAL TUNNEL RELEASE     • CHOLECYSTECTOMY     • CORONARY ARTERY BYPASS GRAFT     • ENDOSCOPY AND COLONOSCOPY     • FOOT SURGERY      Toes   • GASTRIC BANDING      Revision, laparoscopic   • HYSTERECTOMY     • MOUTH SURGERY     • SALPINGO OOPHORECTOMY     • SHOULDER SURGERY     • TRANSESOPHAGEAL ECHOCARDIOGRAM (LAMONTE)      With color flow       Family History   Problem Relation Age of Onset   • Diabetes Other    • Heart disease Other    • Hypertension Other    • Heart disease Mother    • Stroke Mother    • Hypertension Mother    • Diabetes Sister    • Heart disease Sister    • Hypertension Sister    • Heart disease Sister    • Diabetes Sister    • Hypertension Sister    • Diabetes Sister    • Diabetes Sister    • Diabetes Sister    • Diabetes Sister        Social History     Social History   • Marital status:      Spouse name: N/A   • Number of children: N/A   • Years of education: N/A     Social History Main Topics   • Smoking status: Never Smoker   • Smokeless tobacco: Never Used   • Alcohol use No   • Drug use: No   • Sexual activity: Defer      Comment:      Other Topics Concern   • None     Social History Narrative           Objective   Physical Exam   Constitutional: She is oriented to person, place, and time. She appears well-developed and well-nourished. No distress.   HENT:   Head: Normocephalic and atraumatic.   Nose: Nose normal.   Eyes: Conjunctivae are normal.   Neck: Normal range of motion. Neck supple. No JVD present. No tracheal deviation present.   Cardiovascular: Normal rate, regular rhythm and normal heart sounds.    No murmur heard.  Pulmonary/Chest:  Effort normal and breath sounds normal. No respiratory distress. She has no wheezes.   Abdominal: Soft. Bowel sounds are normal. There is tenderness (epigastric / diffuse. ).   Musculoskeletal: Normal range of motion. She exhibits no edema or deformity.   Neurological: She is alert and oriented to person, place, and time. No cranial nerve deficit.   Skin: Skin is warm and dry. No rash noted. She is not diaphoretic. No erythema. No pallor.   Psychiatric: She has a normal mood and affect. Her behavior is normal. Thought content normal.   Nursing note and vitals reviewed.      Procedures         ED Course  ED Course   Comment By Time   EKG interpreted by Dr. Godfrey: Normal sinus rhythm. CHELSEA Atkinson 02/24 1247                  MDM  Number of Diagnoses or Management Options  Gastroparesis: established and worsening     Amount and/or Complexity of Data Reviewed  Clinical lab tests: reviewed    Risk of Complications, Morbidity, and/or Mortality  Presenting problems: moderate  Diagnostic procedures: moderate  Management options: low    Patient Progress  Patient progress: stable      Final diagnoses:   Gastroparesis            CHELSEA Atkinson  02/24/18 [id number]

## 2018-02-24 NOTE — DISCHARGE INSTRUCTIONS
Gastroparesis  Gastroparesis, also called delayed gastric emptying, is a condition in which food takes longer than normal to empty from the stomach. The condition is usually long-lasting (chronic).  What are the causes?  This condition may be caused by:  · An endocrine disorder, such as hypothyroidism or diabetes. Diabetes is the most common cause of this condition.  · A nervous system disease, such as Parkinson disease or multiple sclerosis.  · Cancer, infection, or surgery of the stomach or vagus nerve.  · A connective tissue disorder, such as scleroderma.  · Certain medicines.  In most cases, the cause is not known.  What increases the risk?  This condition is more likely to develop in:  · People with certain disorders, including endocrine disorders, eating disorders, amyloidosis, and scleroderma.  · People with certain diseases, including Parkinson disease or multiple sclerosis.  · People with cancer or infection of the stomach or vagus nerve.  · People who have had surgery on the stomach or vagus nerve.  · People who take certain medicines.  · Women.  What are the signs or symptoms?  Symptoms of this condition include:  · An early feeling of fullness when eating.  · Nausea.  · Weight loss.  · Vomiting.  · Heartburn.  · Abdominal bloating.  · Inconsistent blood glucose levels.  · Lack of appetite.  · Acid from the stomach coming up into the esophagus (gastroesophageal reflux).  · Spasms of the stomach.  Symptoms may come and go.  How is this diagnosed?  This condition is diagnosed with tests, such as:  · Tests that check how long it takes food to move through the stomach and intestines. These tests include:  ¨ Upper gastrointestinal (GI) series. In this test, X-rays of the intestines are taken after you drink a liquid. The liquid makes the intestines show up better on the X-rays.  ¨ Gastric emptying scintigraphy. In this test, scans are taken after you eat food that contains a small amount of radioactive  material.  ¨ Wireless capsule GI monitoring system. This test involves swallowing a capsule that records information about movement through the stomach.  · Gastric manometry. This test measures electrical and muscular activity in the stomach. It is done with a thin tube that is passed down the throat and into the stomach.  · Endoscopy. This test checks for abnormalities in the lining of the stomach. It is done with a long, thin tube that is passed down the throat and into the stomach.  · An ultrasound. This test can help rule out gallbladder disease or pancreatitis as a cause of your symptoms. It uses sound waves to take pictures of the inside of your body.  How is this treated?  There is no cure for gastroparesis. This condition may be managed with:  · Treatment of the underlying condition causing the gastroparesis.  · Lifestyle changes, including exercise and dietary changes. Dietary changes can include:  ¨ Changes in what and when you eat.  ¨ Eating smaller meals more often.  ¨ Eating low-fat foods.  ¨ Eating low-fiber forms of high-fiber foods, such as cooked vegetables instead of raw vegetables.  ¨ Having liquid foods in place of solid foods. Liquid foods are easier to digest.  · Medicines. These may be given to control nausea and vomiting and to stimulate stomach muscles.  · Getting food through a feeding tube. This may be done in severe cases.  · A gastric neurostimulator. This is a device that is inserted into the body with surgery. It helps improve stomach emptying and control nausea and vomiting.  Follow these instructions at home:  · Follow your health care provider's instructions about exercise and diet.  · Take medicines only as directed by your health care provider.  Contact a health care provider if:  · Your symptoms do not improve with treatment.  · You have new symptoms.  Get help right away if:  · You have severe abdominal pain that does not improve with treatment.  · You have nausea that does not  go away.  · You cannot keep fluids down.  This information is not intended to replace advice given to you by your health care provider. Make sure you discuss any questions you have with your health care provider.  Document Released: 12/18/2006 Document Revised: 05/25/2017 Document Reviewed: 12/14/2015  [employer] Interactive Patient Education © 2017 [employer]

## 2018-03-01 DIAGNOSIS — R60.9 FLUID RETENTION: ICD-10-CM

## 2018-03-01 RX ORDER — FUROSEMIDE 40 MG/1
40 TABLET ORAL DAILY
Qty: 90 TABLET | Refills: 3 | Status: SHIPPED | OUTPATIENT
Start: 2018-03-01 | End: 2019-02-08 | Stop reason: SDUPTHER

## 2018-03-06 DIAGNOSIS — F33.1 DEPRESSION, MAJOR, RECURRENT, MODERATE (HCC): ICD-10-CM

## 2018-03-06 RX ORDER — ARIPIPRAZOLE 15 MG/1
TABLET ORAL
Qty: 30 TABLET | Refills: 5 | Status: SHIPPED | OUTPATIENT
Start: 2018-03-06 | End: 2018-08-08 | Stop reason: SDUPTHER

## 2018-03-07 DIAGNOSIS — G89.29 CHRONIC PAIN OF MULTIPLE JOINTS: ICD-10-CM

## 2018-03-07 DIAGNOSIS — M25.50 CHRONIC PAIN OF MULTIPLE JOINTS: ICD-10-CM

## 2018-03-07 RX ORDER — ONDANSETRON HYDROCHLORIDE 8 MG/1
8 TABLET, FILM COATED ORAL EVERY 8 HOURS
Qty: 90 TABLET | Refills: 3 | Status: SHIPPED | OUTPATIENT
Start: 2018-03-07 | End: 2018-12-01

## 2018-03-07 RX ORDER — HYDROCODONE BITARTRATE AND ACETAMINOPHEN 7.5; 325 MG/1; MG/1
1 TABLET ORAL EVERY 4 HOURS PRN
Qty: 180 TABLET | Refills: 0 | Status: SHIPPED | OUTPATIENT
Start: 2018-03-07 | End: 2018-04-06 | Stop reason: SDUPTHER

## 2018-03-07 NOTE — TELEPHONE ENCOUNTER
Patient called for refills for her and her . Returned her phone call and left her voicemail that I had received message.

## 2018-03-08 ENCOUNTER — CLINICAL SUPPORT (OUTPATIENT)
Dept: FAMILY MEDICINE CLINIC | Facility: CLINIC | Age: 58
End: 2018-03-08

## 2018-03-08 DIAGNOSIS — E53.8 B12 DEFICIENCY: ICD-10-CM

## 2018-03-08 PROCEDURE — 96372 THER/PROPH/DIAG INJ SC/IM: CPT | Performed by: FAMILY MEDICINE

## 2018-03-08 RX ADMIN — CYANOCOBALAMIN 1000 MCG: 1000 INJECTION, SOLUTION INTRAMUSCULAR; SUBCUTANEOUS at 08:01

## 2018-03-19 RX ORDER — ATORVASTATIN CALCIUM 40 MG/1
40 TABLET, FILM COATED ORAL NIGHTLY
Qty: 90 TABLET | Refills: 1 | Status: SHIPPED | OUTPATIENT
Start: 2018-03-19 | End: 2018-09-07 | Stop reason: SDUPTHER

## 2018-04-06 ENCOUNTER — CLINICAL SUPPORT (OUTPATIENT)
Dept: FAMILY MEDICINE CLINIC | Facility: CLINIC | Age: 58
End: 2018-04-06

## 2018-04-06 DIAGNOSIS — E53.8 B12 DEFICIENCY: ICD-10-CM

## 2018-04-06 DIAGNOSIS — G89.29 CHRONIC PAIN OF MULTIPLE JOINTS: ICD-10-CM

## 2018-04-06 DIAGNOSIS — M25.50 CHRONIC PAIN OF MULTIPLE JOINTS: ICD-10-CM

## 2018-04-06 PROCEDURE — 96372 THER/PROPH/DIAG INJ SC/IM: CPT | Performed by: FAMILY MEDICINE

## 2018-04-06 RX ORDER — HYDROCODONE BITARTRATE AND ACETAMINOPHEN 7.5; 325 MG/1; MG/1
1 TABLET ORAL EVERY 4 HOURS PRN
Qty: 180 TABLET | Refills: 0 | Status: SHIPPED | OUTPATIENT
Start: 2018-04-06 | End: 2018-05-04 | Stop reason: SDUPTHER

## 2018-04-06 RX ADMIN — CYANOCOBALAMIN 1000 MCG: 1000 INJECTION, SOLUTION INTRAMUSCULAR; SUBCUTANEOUS at 13:13

## 2018-04-16 ENCOUNTER — APPOINTMENT (OUTPATIENT)
Dept: LAB | Facility: HOSPITAL | Age: 58
End: 2018-04-16

## 2018-04-16 LAB
25(OH)D3 SERPL-MCNC: 28.5 NG/ML (ref 30–100)
ALBUMIN SERPL-MCNC: 4.1 G/DL (ref 3.4–4.8)
ALBUMIN/GLOB SERPL: 1.2 G/DL (ref 1.1–1.8)
ALP SERPL-CCNC: 114 U/L (ref 38–126)
ALT SERPL W P-5'-P-CCNC: 41 U/L (ref 9–52)
ANION GAP SERPL CALCULATED.3IONS-SCNC: 17 MMOL/L (ref 5–15)
ARTICHOKE IGE QN: 108 MG/DL (ref 1–129)
AST SERPL-CCNC: 60 U/L (ref 14–36)
BASOPHILS # BLD AUTO: 0.04 10*3/MM3 (ref 0–0.2)
BASOPHILS NFR BLD AUTO: 0.3 % (ref 0–2)
BILIRUB SERPL-MCNC: 0.5 MG/DL (ref 0.2–1.3)
BUN BLD-MCNC: 13 MG/DL (ref 7–21)
BUN/CREAT SERPL: 15.7 (ref 7–25)
CALCIUM SPEC-SCNC: 9.1 MG/DL (ref 8.4–10.2)
CHLORIDE SERPL-SCNC: 98 MMOL/L (ref 95–110)
CHOLEST SERPL-MCNC: 197 MG/DL (ref 0–199)
CO2 SERPL-SCNC: 27 MMOL/L (ref 22–31)
CREAT BLD-MCNC: 0.83 MG/DL (ref 0.5–1)
DEPRECATED RDW RBC AUTO: 43.9 FL (ref 36.4–46.3)
EOSINOPHIL # BLD AUTO: 0.35 10*3/MM3 (ref 0–0.7)
EOSINOPHIL NFR BLD AUTO: 2.9 % (ref 0–7)
ERYTHROCYTE [DISTWIDTH] IN BLOOD BY AUTOMATED COUNT: 14.2 % (ref 11.5–14.5)
GFR SERPL CREATININE-BSD FRML MDRD: 71 ML/MIN/1.73 (ref 51–120)
GLOBULIN UR ELPH-MCNC: 3.4 GM/DL (ref 2.3–3.5)
GLUCOSE BLD-MCNC: 220 MG/DL (ref 60–100)
HBA1C MFR BLD: 7.8 % (ref 4–5.6)
HCT VFR BLD AUTO: 41.3 % (ref 35–45)
HDLC SERPL-MCNC: 33 MG/DL (ref 60–200)
HGB BLD-MCNC: 13.7 G/DL (ref 12–15.5)
IMM GRANULOCYTES # BLD: 0.02 10*3/MM3 (ref 0–0.02)
IMM GRANULOCYTES NFR BLD: 0.2 % (ref 0–0.5)
LDLC/HDLC SERPL: 3.62 {RATIO} (ref 0–3.22)
LYMPHOCYTES # BLD AUTO: 2.77 10*3/MM3 (ref 0.6–4.2)
LYMPHOCYTES NFR BLD AUTO: 22.6 % (ref 10–50)
MCH RBC QN AUTO: 28 PG (ref 26.5–34)
MCHC RBC AUTO-ENTMCNC: 33.2 G/DL (ref 31.4–36)
MCV RBC AUTO: 84.5 FL (ref 80–98)
MONOCYTES # BLD AUTO: 0.72 10*3/MM3 (ref 0–0.9)
MONOCYTES NFR BLD AUTO: 5.9 % (ref 0–12)
NEUTROPHILS # BLD AUTO: 8.36 10*3/MM3 (ref 2–8.6)
NEUTROPHILS NFR BLD AUTO: 68.1 % (ref 37–80)
PLATELET # BLD AUTO: 441 10*3/MM3 (ref 150–450)
PMV BLD AUTO: 9.7 FL (ref 8–12)
POTASSIUM BLD-SCNC: 3.7 MMOL/L (ref 3.5–5.1)
PROT SERPL-MCNC: 7.5 G/DL (ref 6.3–8.6)
RBC # BLD AUTO: 4.89 10*6/MM3 (ref 3.77–5.16)
SODIUM BLD-SCNC: 142 MMOL/L (ref 137–145)
TRIGL SERPL-MCNC: 223 MG/DL (ref 20–199)
TSH SERPL DL<=0.05 MIU/L-ACNC: 4.65 MIU/ML (ref 0.46–4.68)
WBC NRBC COR # BLD: 12.26 10*3/MM3 (ref 3.2–9.8)

## 2018-04-16 PROCEDURE — 82306 VITAMIN D 25 HYDROXY: CPT | Performed by: NURSE PRACTITIONER

## 2018-04-16 PROCEDURE — 36415 COLL VENOUS BLD VENIPUNCTURE: CPT | Performed by: NURSE PRACTITIONER

## 2018-04-16 PROCEDURE — 83036 HEMOGLOBIN GLYCOSYLATED A1C: CPT | Performed by: NURSE PRACTITIONER

## 2018-04-16 PROCEDURE — 84443 ASSAY THYROID STIM HORMONE: CPT | Performed by: NURSE PRACTITIONER

## 2018-04-16 PROCEDURE — 85025 COMPLETE CBC W/AUTO DIFF WBC: CPT | Performed by: NURSE PRACTITIONER

## 2018-04-16 PROCEDURE — 80053 COMPREHEN METABOLIC PANEL: CPT | Performed by: NURSE PRACTITIONER

## 2018-04-16 PROCEDURE — 80061 LIPID PANEL: CPT | Performed by: NURSE PRACTITIONER

## 2018-04-25 ENCOUNTER — OFFICE VISIT (OUTPATIENT)
Dept: ENDOCRINOLOGY | Facility: CLINIC | Age: 58
End: 2018-04-25

## 2018-04-25 VITALS
SYSTOLIC BLOOD PRESSURE: 138 MMHG | HEART RATE: 95 BPM | BODY MASS INDEX: 39.86 KG/M2 | DIASTOLIC BLOOD PRESSURE: 78 MMHG | HEIGHT: 68 IN | WEIGHT: 263 LBS

## 2018-04-25 DIAGNOSIS — E03.9 HYPOTHYROIDISM, UNSPECIFIED TYPE: ICD-10-CM

## 2018-04-25 DIAGNOSIS — E55.9 VITAMIN D DEFICIENCY: ICD-10-CM

## 2018-04-25 DIAGNOSIS — IMO0002 UNCONTROLLED TYPE 2 DIABETES MELLITUS WITH DIABETIC POLYNEUROPATHY, WITH LONG-TERM CURRENT USE OF INSULIN: Primary | ICD-10-CM

## 2018-04-25 PROCEDURE — 99214 OFFICE O/P EST MOD 30 MIN: CPT | Performed by: NURSE PRACTITIONER

## 2018-04-25 NOTE — PROGRESS NOTES
Subjective    Ariana Martinez is a 58 y.o. female. she is here today for follow-up.    History of Present Illness     Duration/Timing: Diabetes mellitus type 2, Age at onset of diabetes: 26 years years, Onset of symptoms gradual  timing - constant     qualtiy - uncontrolled     severity - moderate        -----------------------     Severity (Complications/Hospitalizations)  Secondary Macrovascular Complications: No CAD, No CVA, No PAD  Secondary Microvascular Complications: No Diabetic Nephropathy, No Diabetic Retinopathy, Diabetic Neuropathy     Context  Diabetes Regimen: Insulin, Oral Medications               Lab Results   Component Value Date     HGBA1C 7.4 (H) 10/19/2017               Blood Glucose Readings     Now on dexcom sensor      High am -- missing tresiba           Diet  variable carb intake  Exercise: Exercises  walking     Associated Signs/Symptoms  Hyperglycemic Symptoms: No polyuria, No polydipsia, No polyphagia, No weight gain  Hypoglycemic Episodes: Documented symptomatic hypoglycemia, Seizures and syncope related to hypoglycemia              The following portions of the patient's history were reviewed and updated as appropriate:   Past Medical History:   Diagnosis Date   • Acquired hypothyroidism    • Angina, class IV    • Anxiety    • Benign paroxysmal positional vertigo    • Carpal tunnel syndrome    • Chronic pain    • Coronary atherosclerosis    • Depression    • Diabetes mellitus     Type 2, controlled   • Diabetic polyneuropathy    • Female stress incontinence    • Gastroparesis    • Hashimoto's thyroiditis    • Hyperlipidemia    • Hypertension    • Low back pain    • Malaise and fatigue    • Multiple joint pain    • Obesity     Refuses to be weighed   • Otalgia     Both   • Perforation of tympanic membrane     Left   • Postoperative wound infection    • Vitamin D deficiency      Past Surgical History:   Procedure Laterality Date   • ABDOMINAL SURGERY     • ANGIOPLASTY      coronary    • BREAST BIOPSY Right    • CARDIAC CATHETERIZATION     • CARDIAC CATHETERIZATION N/A 2017    Procedure: Right Heart Cath;  Surgeon: Can Kwon MD PhD;  Location: [facility] INVASIVE LOCATION;  Service:    • CARPAL TUNNEL RELEASE     • CHOLECYSTECTOMY     • CORONARY ARTERY BYPASS GRAFT     • ENDOSCOPY AND COLONOSCOPY     • FOOT SURGERY      Toes   • GASTRIC BANDING      Revision, laparoscopic   • HYSTERECTOMY     • MOUTH SURGERY     • SALPINGO OOPHORECTOMY     • SHOULDER SURGERY     • TRANSESOPHAGEAL ECHOCARDIOGRAM (LAMONTE)      With color flow     Family History   Problem Relation Age of Onset   • Diabetes Other    • Heart disease Other    • Hypertension Other    • Heart disease Mother    • Stroke Mother    • Hypertension Mother    • Diabetes Sister    • Heart disease Sister    • Hypertension Sister    • Heart disease Sister    • Diabetes Sister    • Hypertension Sister    • Diabetes Sister    • Diabetes Sister    • Diabetes Sister    • Diabetes Sister      OB History      Para Term  AB Living    0 0 0 0 0 0    SAB TAB Ectopic Molar Multiple Live Births    0 0 0  0         Current Outpatient Prescriptions   Medication Sig Dispense Refill   • ARIPiprazole (ABILIFY) 15 MG tablet TAKE 1 TABLET BY MOUTH DAILY. 30 tablet 5   • aspirin 81 MG chewable tablet Chew 81 mg daily.     • atorvastatin (LIPITOR) 40 MG tablet TAKE 1 TABLET BY MOUTH EVERY NIGHT. 90 tablet 1   • B-D ULTRAFINE III SHORT PEN 31G X 8 MM misc USE AS DIRECTED 4 TIMES DAILY 150 each 11   • Calcium Citrate-Vitamin D (CITRACAL/VITAMIN D) 250-200 MG-UNIT tablet Take 2 tablets by mouth 2 (two) times a day.     • clopidogrel (PLAVIX) 75 MG tablet TAKE 1 TABLET BY MOUTH DAILY. 90 tablet 3   • furosemide (LASIX) 40 MG tablet Take 1 tablet by mouth Daily. 90 tablet 3   • gabapentin (NEURONTIN) 800 MG tablet Take 1 tablet by mouth 3 (Three) Times a Day. 90 tablet 5   • GLUCAGON EMERGENCY 1 MG injection USE AS DIRECTED AS NEEDED 1 kit  0   • HYDROcodone-acetaminophen (NORCO) 7.5-325 MG per tablet Take 1 tablet by mouth Every 4 (Four) Hours As Needed for Moderate Pain . 180 tablet 0   • Insulin Degludec 200 UNIT/ML solution pen-injector Inject 100 Units under the skin Daily. (Patient taking differently: Inject 100 Units under the skin Every Night.) 6 pen 11   • Insulin Glargine (TOUJEO SOLOSTAR) 300 UNIT/ML solution pen-injector Inject 120 Units under the skin Daily. 12 pen 11   • LORazepam (ATIVAN) 1 MG tablet Take 1 tablet by mouth 2 (Two) Times a Day As Needed for Anxiety. 60 tablet 2   • meclizine (ANTIVERT) 25 MG tablet Take 1 tablet by mouth 3 (Three) Times a Day As Needed for dizziness. 90 tablet 6   • metoclopramide (REGLAN) 10 MG tablet Take 1 tablet by mouth 4 (Four) Times a Day Before Meals & at Bedtime. 30 tablet 0   • metoprolol succinate XL (TOPROL-XL) 25 MG 24 hr tablet Take 1 tablet by mouth Daily. 31 tablet 13   • mirtazapine (REMERON) 45 MG tablet Take 1 tablet by mouth Every Night. 90 tablet 1   • NOVOLOG FLEXPEN 100 UNIT/ML solution pen-injector sc pen INJECT 40 UNITS WITH MEALS 5 pen 5   • ondansetron (ZOFRAN) 8 MG tablet Take 1 tablet by mouth Every 8 (Eight) Hours. 90 tablet 3   • ONE TOUCH ULTRA TEST test strip USE TO TEST SUGAR 4 TIMES A  each 0   • pantoprazole (PROTONIX) 40 MG EC tablet TAKE 1 TABLET BY MOUTH DAILY. 90 tablet 2   • phenazopyridine (PYRIDIUM) 100 MG tablet Take 1 tablet by mouth 3 (Three) Times a Day As Needed for bladder spasms. 9 tablet 0   • RANEXA 500 MG 12 hr tablet TAKE 2 TABLETS BY MOUTH 2 (TWO) TIMES A DAY. 120 tablet 6   • venlafaxine XR (EFFEXOR-XR) 150 MG 24 hr capsule TAKE ONE CAPSULE BY MOUTH TWICE A DAY FOR DEPRESSION 180 capsule 3   • vitamin D (ERGOCALCIFEROL) 77681 units capsule capsule TAKE ONE CAPSULE BY MOUTH EVERY SUNDAY 12 capsule 2     Current Facility-Administered Medications   Medication Dose Route Frequency Provider Last Rate Last Dose   • cyanocobalamin injection 1,000 mcg   1,000 mcg Intramuscular Q28 Days Francisca Fong MD   1,000 mcg at 04/06/18 1313     Allergies   Allergen Reactions   • Seroquel [Quetiapine Fumarate] Anaphylaxis   • Avandia [Rosiglitazone] Swelling   • Morphine And Related Hallucinations     If patient takes too much     Social History     Social History   • Marital status:      Social History Main Topics   • Smoking status: Never Smoker   • Smokeless tobacco: Never Used   • Alcohol use No   • Drug use: No   • Sexual activity: Defer      Comment:      Other Topics Concern   • Not on file       Review of Systems  Review of Systems   Constitutional: Negative for activity change, appetite change, diaphoresis and fatigue.   HENT: Negative for facial swelling, sneezing, sore throat, tinnitus, trouble swallowing and voice change.    Eyes: Negative for photophobia, pain, discharge, redness, itching and visual disturbance.   Respiratory: Negative for apnea, cough, choking, chest tightness and shortness of breath.    Cardiovascular: Negative for chest pain, palpitations and leg swelling.   Gastrointestinal: Negative for abdominal distention, abdominal pain, constipation, diarrhea, nausea and vomiting.   Endocrine: Negative for cold intolerance, heat intolerance, polydipsia, polyphagia and polyuria.   Genitourinary: Negative for difficulty urinating, dysuria, frequency, hematuria and urgency.   Musculoskeletal: Negative for arthralgias, back pain, gait problem, joint swelling, myalgias, neck pain and neck stiffness.   Skin: Negative for color change, pallor, rash and wound.   Neurological: Negative for dizziness, tremors, weakness, light-headedness, numbness and headaches.   Hematological: Negative for adenopathy. Does not bruise/bleed easily.   Psychiatric/Behavioral: Negative for behavioral problems, confusion and sleep disturbance.        Objective    /78 (BP Location: Right arm, Patient Position: Sitting, Cuff Size: Adult)   Pulse 95   Ht  "172.7 cm (68\")   Wt 119 kg (263 lb)   BMI 39.99 kg/m²   Physical Exam   Constitutional: She is oriented to person, place, and time. She appears well-developed and well-nourished. No distress.   HENT:   Head: Normocephalic and atraumatic.   Right Ear: External ear normal.   Left Ear: External ear normal.   Nose: Nose normal.   Eyes: Conjunctivae and EOM are normal. Pupils are equal, round, and reactive to light.   Neck: Normal range of motion. Neck supple. No tracheal deviation present. No thyromegaly present.   Cardiovascular: Normal rate, regular rhythm and normal heart sounds.    No murmur heard.  Pulmonary/Chest: Effort normal and breath sounds normal. No respiratory distress. She has no wheezes.   Abdominal: Soft. Bowel sounds are normal. There is no tenderness. There is no rebound and no guarding.   Musculoskeletal: Normal range of motion. She exhibits no edema, tenderness or deformity.   Neurological: She is alert and oriented to person, place, and time. No cranial nerve deficit.   Skin: Skin is warm and dry. No rash noted.   Psychiatric: She has a normal mood and affect. Her behavior is normal. Judgment and thought content normal.       Lab Review  Glucose (mg/dL)   Date Value   04/16/2018 220 (H)   02/24/2018 72   01/18/2018 287 (H)     Glucose, Arterial (mmol/L)   Date Value   10/14/2017 155     Sodium (mmol/L)   Date Value   04/16/2018 142   02/24/2018 141   01/18/2018 140     Potassium (mmol/L)   Date Value   04/16/2018 3.7   02/24/2018 3.2 (L)   01/18/2018 3.4 (L)     Chloride (mmol/L)   Date Value   04/16/2018 98   02/24/2018 99   01/18/2018 103     CO2 (mmol/L)   Date Value   04/16/2018 27.0   02/24/2018 29.0   01/18/2018 22.0     BUN (mg/dL)   Date Value   04/16/2018 13   02/24/2018 13   01/18/2018 9     Creatinine (mg/dL)   Date Value   04/16/2018 0.83   02/24/2018 0.88   01/18/2018 0.65     Hemoglobin A1C (%)   Date Value   04/16/2018 7.8 (H)   01/18/2018 8.2 (H)   10/19/2017 7.4 (H) "     Triglycerides (mg/dL)   Date Value   04/16/2018 223 (H)   01/18/2018 344 (H)   07/20/2017 389 (H)     LDL Cholesterol  (mg/dL)   Date Value   04/16/2018 108   01/18/2018 130 (H)   07/20/2017 119       Assessment/Plan      1. Uncontrolled type 2 diabetes mellitus with diabetic polyneuropathy, with long-term current use of insulin    2. Hypothyroidism, unspecified type    3. Vitamin D deficiency    .    Medications prescribed:  Outpatient Encounter Prescriptions as of 4/25/2018   Medication Sig Dispense Refill   • ARIPiprazole (ABILIFY) 15 MG tablet TAKE 1 TABLET BY MOUTH DAILY. 30 tablet 5   • aspirin 81 MG chewable tablet Chew 81 mg daily.     • atorvastatin (LIPITOR) 40 MG tablet TAKE 1 TABLET BY MOUTH EVERY NIGHT. 90 tablet 1   • B-D ULTRAFINE III SHORT PEN 31G X 8 MM misc USE AS DIRECTED 4 TIMES DAILY 150 each 11   • Calcium Citrate-Vitamin D (CITRACAL/VITAMIN D) 250-200 MG-UNIT tablet Take 2 tablets by mouth 2 (two) times a day.     • clopidogrel (PLAVIX) 75 MG tablet TAKE 1 TABLET BY MOUTH DAILY. 90 tablet 3   • furosemide (LASIX) 40 MG tablet Take 1 tablet by mouth Daily. 90 tablet 3   • gabapentin (NEURONTIN) 800 MG tablet Take 1 tablet by mouth 3 (Three) Times a Day. 90 tablet 5   • GLUCAGON EMERGENCY 1 MG injection USE AS DIRECTED AS NEEDED 1 kit 0   • HYDROcodone-acetaminophen (NORCO) 7.5-325 MG per tablet Take 1 tablet by mouth Every 4 (Four) Hours As Needed for Moderate Pain . 180 tablet 0   • Insulin Degludec 200 UNIT/ML solution pen-injector Inject 100 Units under the skin Daily. (Patient taking differently: Inject 100 Units under the skin Every Night.) 6 pen 11   • Insulin Glargine (TOUJEO SOLOSTAR) 300 UNIT/ML solution pen-injector Inject 120 Units under the skin Daily. 12 pen 11   • LORazepam (ATIVAN) 1 MG tablet Take 1 tablet by mouth 2 (Two) Times a Day As Needed for Anxiety. 60 tablet 2   • meclizine (ANTIVERT) 25 MG tablet Take 1 tablet by mouth 3 (Three) Times a Day As Needed for dizziness.  90 tablet 6   • metoclopramide (REGLAN) 10 MG tablet Take 1 tablet by mouth 4 (Four) Times a Day Before Meals & at Bedtime. 30 tablet 0   • metoprolol succinate XL (TOPROL-XL) 25 MG 24 hr tablet Take 1 tablet by mouth Daily. 31 tablet 13   • mirtazapine (REMERON) 45 MG tablet Take 1 tablet by mouth Every Night. 90 tablet 1   • NOVOLOG FLEXPEN 100 UNIT/ML solution pen-injector sc pen INJECT 40 UNITS WITH MEALS 5 pen 5   • ondansetron (ZOFRAN) 8 MG tablet Take 1 tablet by mouth Every 8 (Eight) Hours. 90 tablet 3   • ONE TOUCH ULTRA TEST test strip USE TO TEST SUGAR 4 TIMES A  each 0   • pantoprazole (PROTONIX) 40 MG EC tablet TAKE 1 TABLET BY MOUTH DAILY. 90 tablet 2   • phenazopyridine (PYRIDIUM) 100 MG tablet Take 1 tablet by mouth 3 (Three) Times a Day As Needed for bladder spasms. 9 tablet 0   • RANEXA 500 MG 12 hr tablet TAKE 2 TABLETS BY MOUTH 2 (TWO) TIMES A DAY. 120 tablet 6   • venlafaxine XR (EFFEXOR-XR) 150 MG 24 hr capsule TAKE ONE CAPSULE BY MOUTH TWICE A DAY FOR DEPRESSION 180 capsule 3   • vitamin D (ERGOCALCIFEROL) 30690 units capsule capsule TAKE ONE CAPSULE BY MOUTH EVERY SUNDAY 12 capsule 2     Facility-Administered Encounter Medications as of 4/25/2018   Medication Dose Route Frequency Provider Last Rate Last Dose   • cyanocobalamin injection 1,000 mcg  1,000 mcg Intramuscular Q28 Days Francisca Fong MD   1,000 mcg at 04/06/18 1313       Orders placed during this encounter include:  Orders Placed This Encounter   Procedures   • Comprehensive Metabolic Panel   • Hemoglobin A1c   • TSH   • Vitamin D 25 Hydroxy   • CBC & Differential     Order Specific Question:   Manual Differential     Answer:   No     Glycemic Management     Lab Results   Component Value Date    HGBA1C 7.8 (H) 04/16/2018             dexcom sensor            Tresiba -- taking 120 units s  -- change to Toujeo      ==================        Novolog      Taking 60 units -- taking 40 units      Discussed carb counting but  states cannot     She will need to look at her meals because 30 units may not be enough for some meals and for others meals to strong     If you eat a meal and give 30 units and your sugar is 200 or higher before the next meal look back at that meal and the next time you eat it either give more insulin or eat less     Also if you have a low after the meal give less insulin the next time you eat that meal                  ==================     Jardiance 10 mg tablet , take before breakfast---will stop due to dizziness for possible volume depletion-----the dizziness has resolved since stopping jardiance        ==================     Metformin 500 mg tablets - caused diarrhea --stopped      ====================     start bydureon - stopped     Wants to try victoza -- start 0.6 mg one week  Warned of the gastric side effects she does have gastroparesis but she wants to try      Gave a sample she will let me know if she wants an RX called in      If vomiting or abdominal pain stop                 januvia 100 mg daily -- stopped --      ------------------------------     Lipid Management        on Lipitor   on fenofibrate     August 2015     LDL - 122     missed some doses of medication      Feb. 2016     Tg - 241  LDL - 102     missing doses still - please be complaint     May 2016     LDL - 85      Jan. 2017     LDL - 120     Has been taking medication faithfully      LDL needs to be 70 or less     add zetia     Also discussed restarting the jardiance for the heart benefits but refuses     Component      Latest Ref Rng & Units 4/16/2018   Total Cholesterol      0 - 199 mg/dL 197   Triglycerides      20 - 199 mg/dL 223 (H)   HDL Cholesterol      60 - 200 mg/dL 33 (L)   LDL Cholesterol       1 - 129 mg/dL 108   LDL/HDL Ratio      0.00 - 3.22 3.62 (H)              Blood Pressure Management: Control of blood pressure  on ACEi     stopped the lasix   Microvascular Complication Monitoring: Neuropathy type  sensorial  neurontin     intermittetly takes reglan for gastroparesis     states eye exam ws 2015  RX for shoes - diabetic neuropathy      Immunization - last influenza vaccine Oct. 2017         Other Diabetes Related Aspects     TSH abnormal from July to Sept 2014     TSH in the 5 to 7 range     confirmed now Jan 2015     start levothyroxine 50 mcgs daily, skip on Sunday     now TSH nl      stopped levothyroxine      TSH - nl     will monitor TSH         August 2015     TSH - nl     FEb. 2016     TSh - 6     she refuses thyroid medicaton      May 2016     TSH - 5     Jan. 2017      TSH - nl               Lab Results   Component Value Date     TSH 5.510 (H) 10/19/2017          does not want medication      Lab Results   Component Value Date     TSH 4.990 (H) 01/18/2018      Still does not want any medications     Lab Results   Component Value Date    TSH 4.650 04/16/2018        ---     3-15     B12 low      now b12 shots     June 2015     B12- 968     Vitamin d def        Vitamin d - 26      Continue vitamin d supplement     April 2018    Vitamin d -28         Referral to GI      Reason - diarrhea chronic      History of gastroparesis                4. Follow-up: Return in about 3 months (around 7/25/2018) for Recheck.

## 2018-04-26 ENCOUNTER — TELEPHONE (OUTPATIENT)
Dept: ENDOCRINOLOGY | Facility: CLINIC | Age: 58
End: 2018-04-26

## 2018-04-30 ENCOUNTER — HOSPITAL ENCOUNTER (OUTPATIENT)
Facility: HOSPITAL | Age: 58
Setting detail: OBSERVATION
Discharge: HOME | End: 2018-05-02
Attending: FAMILY MEDICINE | Admitting: HOSPITALIST

## 2018-04-30 ENCOUNTER — APPOINTMENT (OUTPATIENT)
Dept: GENERAL RADIOLOGY | Facility: HOSPITAL | Age: 58
End: 2018-04-30

## 2018-04-30 DIAGNOSIS — G89.29 CHRONIC EPIGASTRIC PAIN: ICD-10-CM

## 2018-04-30 DIAGNOSIS — R07.9 CHEST PAIN, UNSPECIFIED TYPE: Primary | ICD-10-CM

## 2018-04-30 DIAGNOSIS — R10.13 CHRONIC EPIGASTRIC PAIN: ICD-10-CM

## 2018-04-30 DIAGNOSIS — R13.13 PHARYNGEAL DYSPHAGIA: ICD-10-CM

## 2018-04-30 LAB
ALBUMIN SERPL-MCNC: 4 G/DL (ref 3.4–4.8)
ALBUMIN/GLOB SERPL: 1.2 G/DL (ref 1.1–1.8)
ALP SERPL-CCNC: 118 U/L (ref 38–126)
ALT SERPL W P-5'-P-CCNC: 42 U/L (ref 9–52)
ANION GAP SERPL CALCULATED.3IONS-SCNC: 9 MMOL/L (ref 5–15)
AST SERPL-CCNC: 45 U/L (ref 14–36)
BASOPHILS # BLD AUTO: 0.03 10*3/MM3 (ref 0–0.2)
BASOPHILS NFR BLD AUTO: 0.2 % (ref 0–2)
BILIRUB SERPL-MCNC: 0.2 MG/DL (ref 0.2–1.3)
BUN BLD-MCNC: 15 MG/DL (ref 7–21)
BUN/CREAT SERPL: 16.5 (ref 7–25)
CALCIUM SPEC-SCNC: 9.4 MG/DL (ref 8.4–10.2)
CHLORIDE SERPL-SCNC: 99 MMOL/L (ref 95–110)
CO2 SERPL-SCNC: 31 MMOL/L (ref 22–31)
CREAT BLD-MCNC: 0.91 MG/DL (ref 0.5–1)
DEPRECATED RDW RBC AUTO: 44.6 FL (ref 36.4–46.3)
EOSINOPHIL # BLD AUTO: 0.28 10*3/MM3 (ref 0–0.7)
EOSINOPHIL NFR BLD AUTO: 2.1 % (ref 0–7)
ERYTHROCYTE [DISTWIDTH] IN BLOOD BY AUTOMATED COUNT: 14.5 % (ref 11.5–14.5)
GFR SERPL CREATININE-BSD FRML MDRD: 64 ML/MIN/1.73 (ref 51–120)
GLOBULIN UR ELPH-MCNC: 3.3 GM/DL (ref 2.3–3.5)
GLUCOSE BLD-MCNC: 193 MG/DL (ref 60–100)
HCT VFR BLD AUTO: 40.3 % (ref 35–45)
HGB BLD-MCNC: 13.4 G/DL (ref 12–15.5)
HOLD SPECIMEN: NORMAL
HOLD SPECIMEN: NORMAL
IMM GRANULOCYTES # BLD: 0.05 10*3/MM3 (ref 0–0.02)
IMM GRANULOCYTES NFR BLD: 0.4 % (ref 0–0.5)
INR PPP: 0.99 (ref 0.8–1.2)
LYMPHOCYTES # BLD AUTO: 2.65 10*3/MM3 (ref 0.6–4.2)
LYMPHOCYTES NFR BLD AUTO: 20.1 % (ref 10–50)
MCH RBC QN AUTO: 28.6 PG (ref 26.5–34)
MCHC RBC AUTO-ENTMCNC: 33.3 G/DL (ref 31.4–36)
MCV RBC AUTO: 85.9 FL (ref 80–98)
MONOCYTES # BLD AUTO: 0.6 10*3/MM3 (ref 0–0.9)
MONOCYTES NFR BLD AUTO: 4.6 % (ref 0–12)
NEUTROPHILS # BLD AUTO: 9.57 10*3/MM3 (ref 2–8.6)
NEUTROPHILS NFR BLD AUTO: 72.6 % (ref 37–80)
NT-PROBNP SERPL-MCNC: 98.9 PG/ML (ref 0–900)
PLATELET # BLD AUTO: 442 10*3/MM3 (ref 150–450)
PMV BLD AUTO: 8.9 FL (ref 8–12)
POTASSIUM BLD-SCNC: 4.1 MMOL/L (ref 3.5–5.1)
PROT SERPL-MCNC: 7.3 G/DL (ref 6.3–8.6)
PROTHROMBIN TIME: 12.9 SECONDS (ref 11.1–15.3)
RBC # BLD AUTO: 4.69 10*6/MM3 (ref 3.77–5.16)
SODIUM BLD-SCNC: 139 MMOL/L (ref 137–145)
TROPONIN I SERPL-MCNC: <0.012 NG/ML
TROPONIN I SERPL-MCNC: <0.012 NG/ML
WBC NRBC COR # BLD: 13.18 10*3/MM3 (ref 3.2–9.8)
WHOLE BLOOD HOLD SPECIMEN: NORMAL
WHOLE BLOOD HOLD SPECIMEN: NORMAL

## 2018-04-30 PROCEDURE — 80053 COMPREHEN METABOLIC PANEL: CPT | Performed by: STUDENT IN AN ORGANIZED HEALTH CARE EDUCATION/TRAINING PROGRAM

## 2018-04-30 PROCEDURE — G0378 HOSPITAL OBSERVATION PER HR: HCPCS

## 2018-04-30 PROCEDURE — 83880 ASSAY OF NATRIURETIC PEPTIDE: CPT | Performed by: STUDENT IN AN ORGANIZED HEALTH CARE EDUCATION/TRAINING PROGRAM

## 2018-04-30 PROCEDURE — 96376 TX/PRO/DX INJ SAME DRUG ADON: CPT

## 2018-04-30 PROCEDURE — 93010 ELECTROCARDIOGRAM REPORT: CPT | Performed by: INTERNAL MEDICINE

## 2018-04-30 PROCEDURE — 25010000002 ONDANSETRON PER 1 MG: Performed by: STUDENT IN AN ORGANIZED HEALTH CARE EDUCATION/TRAINING PROGRAM

## 2018-04-30 PROCEDURE — 71045 X-RAY EXAM CHEST 1 VIEW: CPT

## 2018-04-30 PROCEDURE — 99284 EMERGENCY DEPT VISIT MOD MDM: CPT

## 2018-04-30 PROCEDURE — 96374 THER/PROPH/DIAG INJ IV PUSH: CPT

## 2018-04-30 PROCEDURE — 84484 ASSAY OF TROPONIN QUANT: CPT | Performed by: STUDENT IN AN ORGANIZED HEALTH CARE EDUCATION/TRAINING PROGRAM

## 2018-04-30 PROCEDURE — 85025 COMPLETE CBC W/AUTO DIFF WBC: CPT | Performed by: STUDENT IN AN ORGANIZED HEALTH CARE EDUCATION/TRAINING PROGRAM

## 2018-04-30 PROCEDURE — 93005 ELECTROCARDIOGRAM TRACING: CPT | Performed by: FAMILY MEDICINE

## 2018-04-30 PROCEDURE — 85610 PROTHROMBIN TIME: CPT | Performed by: STUDENT IN AN ORGANIZED HEALTH CARE EDUCATION/TRAINING PROGRAM

## 2018-04-30 RX ORDER — ONDANSETRON 2 MG/ML
4 INJECTION INTRAMUSCULAR; INTRAVENOUS ONCE
Status: COMPLETED | OUTPATIENT
Start: 2018-04-30 | End: 2018-04-30

## 2018-04-30 RX ORDER — ASPIRIN 325 MG
325 TABLET ORAL ONCE
Status: COMPLETED | OUTPATIENT
Start: 2018-04-30 | End: 2018-04-30

## 2018-04-30 RX ORDER — DEXTROSE MONOHYDRATE 25 G/50ML
25 INJECTION, SOLUTION INTRAVENOUS
Status: DISCONTINUED | OUTPATIENT
Start: 2018-04-30 | End: 2018-05-02 | Stop reason: HOSPADM

## 2018-04-30 RX ORDER — MECLIZINE HYDROCHLORIDE 25 MG/1
25 TABLET ORAL 3 TIMES DAILY PRN
Status: DISCONTINUED | OUTPATIENT
Start: 2018-04-30 | End: 2018-05-02 | Stop reason: HOSPADM

## 2018-04-30 RX ORDER — SODIUM CHLORIDE 0.9 % (FLUSH) 0.9 %
10 SYRINGE (ML) INJECTION AS NEEDED
Status: DISCONTINUED | OUTPATIENT
Start: 2018-04-30 | End: 2018-05-02 | Stop reason: HOSPADM

## 2018-04-30 RX ORDER — SODIUM CHLORIDE 0.9 % (FLUSH) 0.9 %
1-10 SYRINGE (ML) INJECTION AS NEEDED
Status: DISCONTINUED | OUTPATIENT
Start: 2018-04-30 | End: 2018-05-02 | Stop reason: HOSPADM

## 2018-04-30 RX ORDER — METOCLOPRAMIDE 10 MG/1
10 TABLET ORAL
Status: DISCONTINUED | OUTPATIENT
Start: 2018-05-01 | End: 2018-05-02 | Stop reason: HOSPADM

## 2018-04-30 RX ORDER — NICOTINE POLACRILEX 4 MG
15 LOZENGE BUCCAL
Status: DISCONTINUED | OUTPATIENT
Start: 2018-04-30 | End: 2018-05-02 | Stop reason: HOSPADM

## 2018-04-30 RX ORDER — GABAPENTIN 400 MG/1
800 CAPSULE ORAL EVERY 8 HOURS SCHEDULED
Status: DISCONTINUED | OUTPATIENT
Start: 2018-05-01 | End: 2018-05-02 | Stop reason: HOSPADM

## 2018-04-30 RX ORDER — HYDROCODONE BITARTRATE AND ACETAMINOPHEN 7.5; 325 MG/1; MG/1
1 TABLET ORAL EVERY 4 HOURS PRN
Status: DISCONTINUED | OUTPATIENT
Start: 2018-04-30 | End: 2018-05-02 | Stop reason: HOSPADM

## 2018-04-30 RX ORDER — CLOPIDOGREL BISULFATE 75 MG/1
75 TABLET ORAL DAILY
Status: DISCONTINUED | OUTPATIENT
Start: 2018-05-01 | End: 2018-05-02 | Stop reason: HOSPADM

## 2018-04-30 RX ORDER — ARIPIPRAZOLE 15 MG/1
15 TABLET ORAL DAILY
Status: DISCONTINUED | OUTPATIENT
Start: 2018-05-01 | End: 2018-05-02 | Stop reason: HOSPADM

## 2018-04-30 RX ORDER — NALOXONE HCL 0.4 MG/ML
0.4 VIAL (ML) INJECTION
Status: DISCONTINUED | OUTPATIENT
Start: 2018-04-30 | End: 2018-05-02 | Stop reason: HOSPADM

## 2018-04-30 RX ORDER — HEPARIN SODIUM 5000 [USP'U]/ML
5000 INJECTION, SOLUTION INTRAVENOUS; SUBCUTANEOUS EVERY 8 HOURS SCHEDULED
Status: DISCONTINUED | OUTPATIENT
Start: 2018-05-01 | End: 2018-05-02 | Stop reason: HOSPADM

## 2018-04-30 RX ORDER — RANOLAZINE 500 MG/1
1000 TABLET, EXTENDED RELEASE ORAL 2 TIMES DAILY
Status: DISCONTINUED | OUTPATIENT
Start: 2018-05-01 | End: 2018-05-01

## 2018-04-30 RX ORDER — ASPIRIN 81 MG/1
81 TABLET, CHEWABLE ORAL DAILY
Status: DISCONTINUED | OUTPATIENT
Start: 2018-05-01 | End: 2018-05-02 | Stop reason: HOSPADM

## 2018-04-30 RX ORDER — VENLAFAXINE HYDROCHLORIDE 75 MG/1
150 CAPSULE, EXTENDED RELEASE ORAL
Status: DISCONTINUED | OUTPATIENT
Start: 2018-05-01 | End: 2018-05-02 | Stop reason: HOSPADM

## 2018-04-30 RX ORDER — FUROSEMIDE 40 MG/1
40 TABLET ORAL DAILY
Status: DISCONTINUED | OUTPATIENT
Start: 2018-05-01 | End: 2018-05-02 | Stop reason: HOSPADM

## 2018-04-30 RX ORDER — METOPROLOL SUCCINATE 25 MG/1
25 TABLET, EXTENDED RELEASE ORAL DAILY
Status: DISCONTINUED | OUTPATIENT
Start: 2018-05-01 | End: 2018-05-02 | Stop reason: HOSPADM

## 2018-04-30 RX ORDER — ATORVASTATIN CALCIUM 40 MG/1
40 TABLET, FILM COATED ORAL NIGHTLY
Status: DISCONTINUED | OUTPATIENT
Start: 2018-05-01 | End: 2018-05-02 | Stop reason: HOSPADM

## 2018-04-30 RX ORDER — MIRTAZAPINE 15 MG/1
45 TABLET, FILM COATED ORAL NIGHTLY
Status: DISCONTINUED | OUTPATIENT
Start: 2018-05-01 | End: 2018-05-02 | Stop reason: HOSPADM

## 2018-04-30 RX ORDER — ONDANSETRON 4 MG/1
8 TABLET, FILM COATED ORAL EVERY 8 HOURS PRN
Status: DISCONTINUED | OUTPATIENT
Start: 2018-04-30 | End: 2018-05-01

## 2018-04-30 RX ORDER — PANTOPRAZOLE SODIUM 40 MG/1
40 TABLET, DELAYED RELEASE ORAL DAILY
Status: DISCONTINUED | OUTPATIENT
Start: 2018-05-01 | End: 2018-05-02 | Stop reason: HOSPADM

## 2018-04-30 RX ORDER — LORAZEPAM 1 MG/1
1 TABLET ORAL 2 TIMES DAILY PRN
Status: DISCONTINUED | OUTPATIENT
Start: 2018-04-30 | End: 2018-05-02 | Stop reason: HOSPADM

## 2018-04-30 RX ADMIN — ONDANSETRON 4 MG: 2 INJECTION, SOLUTION INTRAMUSCULAR; INTRAVENOUS at 19:45

## 2018-04-30 RX ADMIN — NITROGLYCERIN 1 INCH: 20 OINTMENT TOPICAL at 21:17

## 2018-04-30 RX ADMIN — ASPIRIN 325 MG: 325 TABLET ORAL at 19:45

## 2018-04-30 RX ADMIN — ONDANSETRON 4 MG: 2 INJECTION INTRAMUSCULAR; INTRAVENOUS at 21:24

## 2018-05-01 ENCOUNTER — APPOINTMENT (OUTPATIENT)
Dept: NUCLEAR MEDICINE | Facility: HOSPITAL | Age: 58
End: 2018-05-01

## 2018-05-01 ENCOUNTER — APPOINTMENT (OUTPATIENT)
Dept: CARDIOLOGY | Facility: HOSPITAL | Age: 58
End: 2018-05-01
Attending: INTERNAL MEDICINE

## 2018-05-01 LAB
ANION GAP SERPL CALCULATED.3IONS-SCNC: 10 MMOL/L (ref 5–15)
ANION GAP SERPL CALCULATED.3IONS-SCNC: 9 MMOL/L (ref 5–15)
BASOPHILS # BLD AUTO: 0.03 10*3/MM3 (ref 0–0.2)
BASOPHILS # BLD AUTO: 0.03 10*3/MM3 (ref 0–0.2)
BASOPHILS NFR BLD AUTO: 0.2 % (ref 0–2)
BASOPHILS NFR BLD AUTO: 0.3 % (ref 0–2)
BH CV ECHO MEAS - ACS: 1.7 CM
BH CV ECHO MEAS - AO MAX PG (FULL): 4.3 MMHG
BH CV ECHO MEAS - AO MAX PG: 7.6 MMHG
BH CV ECHO MEAS - AO MEAN PG (FULL): 2 MMHG
BH CV ECHO MEAS - AO MEAN PG: 4 MMHG
BH CV ECHO MEAS - AO ROOT AREA (BSA CORRECTED): 1.5
BH CV ECHO MEAS - AO ROOT AREA: 9.6 CM^2
BH CV ECHO MEAS - AO ROOT DIAM: 3.5 CM
BH CV ECHO MEAS - AO V2 MAX: 138 CM/SEC
BH CV ECHO MEAS - AO V2 MEAN: 95.8 CM/SEC
BH CV ECHO MEAS - AO V2 VTI: 27.6 CM
BH CV ECHO MEAS - AVA(I,A): 2.4 CM^2
BH CV ECHO MEAS - AVA(I,D): 2.4 CM^2
BH CV ECHO MEAS - AVA(V,A): 2.3 CM^2
BH CV ECHO MEAS - AVA(V,D): 2.3 CM^2
BH CV ECHO MEAS - BSA(HAYCOCK): 2.4 M^2
BH CV ECHO MEAS - BSA: 2.3 M^2
BH CV ECHO MEAS - BZI_BMI: 39.1 KILOGRAMS/M^2
BH CV ECHO MEAS - BZI_METRIC_HEIGHT: 172.7 CM
BH CV ECHO MEAS - BZI_METRIC_WEIGHT: 116.6 KG
BH CV ECHO MEAS - EDV(CUBED): 101.8 ML
BH CV ECHO MEAS - EDV(TEICH): 100.8 ML
BH CV ECHO MEAS - EF(CUBED): 64.1 %
BH CV ECHO MEAS - EF(TEICH): 55.6 %
BH CV ECHO MEAS - ESV(CUBED): 36.6 ML
BH CV ECHO MEAS - ESV(TEICH): 44.8 ML
BH CV ECHO MEAS - FS: 28.9 %
BH CV ECHO MEAS - IVS/LVPW: 1.1
BH CV ECHO MEAS - IVSD: 1.3 CM
BH CV ECHO MEAS - LA DIMENSION: 5.5 CM
BH CV ECHO MEAS - LA/AO: 1.6
BH CV ECHO MEAS - LV MASS(C)D: 234.2 GRAMS
BH CV ECHO MEAS - LV MASS(C)DI: 103 GRAMS/M^2
BH CV ECHO MEAS - LV MAX PG: 3.3 MMHG
BH CV ECHO MEAS - LV MEAN PG: 2 MMHG
BH CV ECHO MEAS - LV V1 MAX: 90.9 CM/SEC
BH CV ECHO MEAS - LV V1 MEAN: 65.5 CM/SEC
BH CV ECHO MEAS - LV V1 VTI: 19.5 CM
BH CV ECHO MEAS - LVIDD: 4.7 CM
BH CV ECHO MEAS - LVIDS: 3.3 CM
BH CV ECHO MEAS - LVOT AREA (M): 3.5 CM^2
BH CV ECHO MEAS - LVOT AREA: 3.5 CM^2
BH CV ECHO MEAS - LVOT DIAM: 2.1 CM
BH CV ECHO MEAS - LVPWD: 1.3 CM
BH CV ECHO MEAS - MR MAX PG: 61.2 MMHG
BH CV ECHO MEAS - MR MAX VEL: 391 CM/SEC
BH CV ECHO MEAS - MV A MAX VEL: 88.8 CM/SEC
BH CV ECHO MEAS - MV DEC SLOPE: 327 CM/SEC^2
BH CV ECHO MEAS - MV E MAX VEL: 87.3 CM/SEC
BH CV ECHO MEAS - MV E/A: 0.98
BH CV ECHO MEAS - MV MAX PG: 4.2 MMHG
BH CV ECHO MEAS - MV MEAN PG: 2 MMHG
BH CV ECHO MEAS - MV P1/2T MAX VEL: 89.5 CM/SEC
BH CV ECHO MEAS - MV P1/2T: 80.2 MSEC
BH CV ECHO MEAS - MV V2 MAX: 102 CM/SEC
BH CV ECHO MEAS - MV V2 MEAN: 57.1 CM/SEC
BH CV ECHO MEAS - MV V2 VTI: 23.7 CM
BH CV ECHO MEAS - MVA P1/2T LCG: 2.5 CM^2
BH CV ECHO MEAS - MVA(P1/2T): 2.7 CM^2
BH CV ECHO MEAS - MVA(VTI): 2.8 CM^2
BH CV ECHO MEAS - PA MAX PG: 3.5 MMHG
BH CV ECHO MEAS - PA V2 MAX: 93.7 CM/SEC
BH CV ECHO MEAS - RAP SYSTOLE: 10 MMHG
BH CV ECHO MEAS - RVDD: 3 CM
BH CV ECHO MEAS - RVSP: 46 MMHG
BH CV ECHO MEAS - SI(AO): 116.8 ML/M^2
BH CV ECHO MEAS - SI(CUBED): 28.7 ML/M^2
BH CV ECHO MEAS - SI(LVOT): 29.7 ML/M^2
BH CV ECHO MEAS - SI(TEICH): 24.7 ML/M^2
BH CV ECHO MEAS - SV(AO): 265.5 ML
BH CV ECHO MEAS - SV(CUBED): 65.3 ML
BH CV ECHO MEAS - SV(LVOT): 67.5 ML
BH CV ECHO MEAS - SV(TEICH): 56.1 ML
BH CV ECHO MEAS - TR MAX VEL: 261 CM/SEC
BILIRUB UR QL STRIP: NEGATIVE
BUN BLD-MCNC: 15 MG/DL (ref 7–21)
BUN BLD-MCNC: 15 MG/DL (ref 7–21)
BUN/CREAT SERPL: 15.3 (ref 7–25)
BUN/CREAT SERPL: 16 (ref 7–25)
CALCIUM SPEC-SCNC: 9.1 MG/DL (ref 8.4–10.2)
CALCIUM SPEC-SCNC: 9.4 MG/DL (ref 8.4–10.2)
CHLORIDE SERPL-SCNC: 100 MMOL/L (ref 95–110)
CHLORIDE SERPL-SCNC: 100 MMOL/L (ref 95–110)
CLARITY UR: ABNORMAL
CO2 SERPL-SCNC: 29 MMOL/L (ref 22–31)
CO2 SERPL-SCNC: 29 MMOL/L (ref 22–31)
COLOR UR: YELLOW
CREAT BLD-MCNC: 0.94 MG/DL (ref 0.5–1)
CREAT BLD-MCNC: 0.98 MG/DL (ref 0.5–1)
DEPRECATED RDW RBC AUTO: 44.4 FL (ref 36.4–46.3)
DEPRECATED RDW RBC AUTO: 45.2 FL (ref 36.4–46.3)
EOSINOPHIL # BLD AUTO: 0.34 10*3/MM3 (ref 0–0.7)
EOSINOPHIL # BLD AUTO: 0.36 10*3/MM3 (ref 0–0.7)
EOSINOPHIL NFR BLD AUTO: 2.7 % (ref 0–7)
EOSINOPHIL NFR BLD AUTO: 3.1 % (ref 0–7)
ERYTHROCYTE [DISTWIDTH] IN BLOOD BY AUTOMATED COUNT: 14.4 % (ref 11.5–14.5)
ERYTHROCYTE [DISTWIDTH] IN BLOOD BY AUTOMATED COUNT: 14.5 % (ref 11.5–14.5)
GFR SERPL CREATININE-BSD FRML MDRD: 59 ML/MIN/1.73 (ref 60–120)
GFR SERPL CREATININE-BSD FRML MDRD: 62 ML/MIN/1.73 (ref 51–120)
GLUCOSE BLD-MCNC: 182 MG/DL (ref 60–100)
GLUCOSE BLD-MCNC: 77 MG/DL (ref 60–100)
GLUCOSE BLDC GLUCOMTR-MCNC: 165 MG/DL (ref 70–130)
GLUCOSE BLDC GLUCOMTR-MCNC: 172 MG/DL (ref 70–130)
GLUCOSE BLDC GLUCOMTR-MCNC: 216 MG/DL (ref 70–130)
GLUCOSE UR STRIP-MCNC: NEGATIVE MG/DL
HCT VFR BLD AUTO: 38 % (ref 35–45)
HCT VFR BLD AUTO: 39.5 % (ref 35–45)
HGB BLD-MCNC: 12.6 G/DL (ref 12–15.5)
HGB BLD-MCNC: 13.3 G/DL (ref 12–15.5)
HGB UR QL STRIP.AUTO: NEGATIVE
IMM GRANULOCYTES # BLD: 0.04 10*3/MM3 (ref 0–0.02)
IMM GRANULOCYTES # BLD: 0.05 10*3/MM3 (ref 0–0.02)
IMM GRANULOCYTES NFR BLD: 0.4 % (ref 0–0.5)
IMM GRANULOCYTES NFR BLD: 0.4 % (ref 0–0.5)
KETONES UR QL STRIP: NEGATIVE
LEUKOCYTE ESTERASE UR QL STRIP.AUTO: NEGATIVE
LYMPHOCYTES # BLD AUTO: 2.85 10*3/MM3 (ref 0.6–4.2)
LYMPHOCYTES # BLD AUTO: 3.6 10*3/MM3 (ref 0.6–4.2)
LYMPHOCYTES NFR BLD AUTO: 25.8 % (ref 10–50)
LYMPHOCYTES NFR BLD AUTO: 27.2 % (ref 10–50)
MAXIMAL PREDICTED HEART RATE: 164 BPM
MCH RBC QN AUTO: 28.1 PG (ref 26.5–34)
MCH RBC QN AUTO: 28.9 PG (ref 26.5–34)
MCHC RBC AUTO-ENTMCNC: 33.2 G/DL (ref 31.4–36)
MCHC RBC AUTO-ENTMCNC: 33.7 G/DL (ref 31.4–36)
MCV RBC AUTO: 84.6 FL (ref 80–98)
MCV RBC AUTO: 85.7 FL (ref 80–98)
MONOCYTES # BLD AUTO: 0.75 10*3/MM3 (ref 0–0.9)
MONOCYTES # BLD AUTO: 1.24 10*3/MM3 (ref 0–0.9)
MONOCYTES NFR BLD AUTO: 6.8 % (ref 0–12)
MONOCYTES NFR BLD AUTO: 9.4 % (ref 0–12)
NEUTROPHILS # BLD AUTO: 7.03 10*3/MM3 (ref 2–8.6)
NEUTROPHILS # BLD AUTO: 7.96 10*3/MM3 (ref 2–8.6)
NEUTROPHILS NFR BLD AUTO: 60.1 % (ref 37–80)
NEUTROPHILS NFR BLD AUTO: 63.6 % (ref 37–80)
NITRITE UR QL STRIP: NEGATIVE
PH UR STRIP.AUTO: <=5 [PH] (ref 5–9)
PLATELET # BLD AUTO: 360 10*3/MM3 (ref 150–450)
PLATELET # BLD AUTO: 452 10*3/MM3 (ref 150–450)
PMV BLD AUTO: 8.7 FL (ref 8–12)
PMV BLD AUTO: 9.3 FL (ref 8–12)
POTASSIUM BLD-SCNC: 3.6 MMOL/L (ref 3.5–5.1)
POTASSIUM BLD-SCNC: 3.9 MMOL/L (ref 3.5–5.1)
PROT UR QL STRIP: NEGATIVE
RBC # BLD AUTO: 4.49 10*6/MM3 (ref 3.77–5.16)
RBC # BLD AUTO: 4.61 10*6/MM3 (ref 3.77–5.16)
SODIUM BLD-SCNC: 138 MMOL/L (ref 137–145)
SODIUM BLD-SCNC: 139 MMOL/L (ref 137–145)
SP GR UR STRIP: 1.02 (ref 1–1.03)
STRESS TARGET HR: 139 BPM
TROPONIN I SERPL-MCNC: <0.012 NG/ML
TROPONIN I SERPL-MCNC: <0.012 NG/ML
UROBILINOGEN UR QL STRIP: ABNORMAL
WBC NRBC COR # BLD: 11.04 10*3/MM3 (ref 3.2–9.8)
WBC NRBC COR # BLD: 13.24 10*3/MM3 (ref 3.2–9.8)

## 2018-05-01 PROCEDURE — G0378 HOSPITAL OBSERVATION PER HR: HCPCS

## 2018-05-01 PROCEDURE — 93306 TTE W/DOPPLER COMPLETE: CPT | Performed by: INTERNAL MEDICINE

## 2018-05-01 PROCEDURE — 25010000002 ONDANSETRON PER 1 MG: Performed by: NURSE PRACTITIONER

## 2018-05-01 PROCEDURE — 99225 PR SBSQ OBSERVATION CARE/DAY 25 MINUTES: CPT | Performed by: NURSE PRACTITIONER

## 2018-05-01 PROCEDURE — 96376 TX/PRO/DX INJ SAME DRUG ADON: CPT

## 2018-05-01 PROCEDURE — 93010 ELECTROCARDIOGRAM REPORT: CPT | Performed by: HOSPITALIST

## 2018-05-01 PROCEDURE — A9500 TC99M SESTAMIBI: HCPCS | Performed by: NURSE PRACTITIONER

## 2018-05-01 PROCEDURE — 0 TECHNETIUM SESTAMIBI: Performed by: NURSE PRACTITIONER

## 2018-05-01 PROCEDURE — 96372 THER/PROPH/DIAG INJ SC/IM: CPT

## 2018-05-01 PROCEDURE — 85025 COMPLETE CBC W/AUTO DIFF WBC: CPT | Performed by: HOSPITALIST

## 2018-05-01 PROCEDURE — 93306 TTE W/DOPPLER COMPLETE: CPT

## 2018-05-01 PROCEDURE — 80048 BASIC METABOLIC PNL TOTAL CA: CPT | Performed by: HOSPITALIST

## 2018-05-01 PROCEDURE — 63710000001 INSULIN ASPART PER 5 UNITS: Performed by: HOSPITALIST

## 2018-05-01 PROCEDURE — 25010000002 MORPHINE PER 10 MG: Performed by: NURSE PRACTITIONER

## 2018-05-01 PROCEDURE — 84484 ASSAY OF TROPONIN QUANT: CPT | Performed by: HOSPITALIST

## 2018-05-01 PROCEDURE — 93005 ELECTROCARDIOGRAM TRACING: CPT | Performed by: HOSPITALIST

## 2018-05-01 PROCEDURE — 25010000002 HEPARIN (PORCINE) PER 1000 UNITS: Performed by: HOSPITALIST

## 2018-05-01 PROCEDURE — 82962 GLUCOSE BLOOD TEST: CPT

## 2018-05-01 PROCEDURE — 25010000002 MORPHINE PER 10 MG: Performed by: HOSPITALIST

## 2018-05-01 PROCEDURE — 96375 TX/PRO/DX INJ NEW DRUG ADDON: CPT

## 2018-05-01 PROCEDURE — 81003 URINALYSIS AUTO W/O SCOPE: CPT | Performed by: NURSE PRACTITIONER

## 2018-05-01 RX ORDER — ONDANSETRON 2 MG/ML
4 INJECTION INTRAMUSCULAR; INTRAVENOUS EVERY 6 HOURS PRN
Status: DISCONTINUED | OUTPATIENT
Start: 2018-05-01 | End: 2018-05-02 | Stop reason: HOSPADM

## 2018-05-01 RX ADMIN — GABAPENTIN 800 MG: 400 CAPSULE ORAL at 20:13

## 2018-05-01 RX ADMIN — VENLAFAXINE HYDROCHLORIDE 150 MG: 75 CAPSULE, EXTENDED RELEASE ORAL at 08:15

## 2018-05-01 RX ADMIN — TECHNETIUM TC 99M SESTAMIBI 1 DOSE: 1 INJECTION INTRAVENOUS at 13:22

## 2018-05-01 RX ADMIN — METOCLOPRAMIDE 10 MG: 10 TABLET ORAL at 20:08

## 2018-05-01 RX ADMIN — MORPHINE SULFATE 1 MG: 4 INJECTION INTRAVENOUS at 10:29

## 2018-05-01 RX ADMIN — HEPARIN SODIUM 5000 UNITS: 5000 INJECTION, SOLUTION INTRAVENOUS; SUBCUTANEOUS at 05:42

## 2018-05-01 RX ADMIN — HYDROCODONE BITARTRATE AND ACETAMINOPHEN 1 TABLET: 7.5; 325 TABLET ORAL at 23:04

## 2018-05-01 RX ADMIN — METOPROLOL SUCCINATE 25 MG: 25 TABLET, EXTENDED RELEASE ORAL at 08:17

## 2018-05-01 RX ADMIN — ASPIRIN 81 MG 81 MG: 81 TABLET ORAL at 08:16

## 2018-05-01 RX ADMIN — HEPARIN SODIUM 5000 UNITS: 5000 INJECTION, SOLUTION INTRAVENOUS; SUBCUTANEOUS at 01:12

## 2018-05-01 RX ADMIN — FUROSEMIDE 40 MG: 40 TABLET ORAL at 08:17

## 2018-05-01 RX ADMIN — INSULIN ASPART 40 UNITS: 100 INJECTION, SOLUTION INTRAVENOUS; SUBCUTANEOUS at 07:31

## 2018-05-01 RX ADMIN — HEPARIN SODIUM 5000 UNITS: 5000 INJECTION, SOLUTION INTRAVENOUS; SUBCUTANEOUS at 13:11

## 2018-05-01 RX ADMIN — ARIPIPRAZOLE 15 MG: 15 TABLET ORAL at 08:15

## 2018-05-01 RX ADMIN — CLOPIDOGREL BISULFATE 75 MG: 75 TABLET ORAL at 08:16

## 2018-05-01 RX ADMIN — MORPHINE SULFATE 1 MG: 4 INJECTION INTRAVENOUS at 17:15

## 2018-05-01 RX ADMIN — ATORVASTATIN CALCIUM 40 MG: 40 TABLET, FILM COATED ORAL at 20:08

## 2018-05-01 RX ADMIN — Medication 1 TABLET: at 08:16

## 2018-05-01 RX ADMIN — INSULIN ASPART 3 UNITS: 100 INJECTION, SOLUTION INTRAVENOUS; SUBCUTANEOUS at 07:32

## 2018-05-01 RX ADMIN — LORAZEPAM 1 MG: 1 TABLET ORAL at 11:17

## 2018-05-01 RX ADMIN — INSULIN ASPART 3 UNITS: 100 INJECTION, SOLUTION INTRAVENOUS; SUBCUTANEOUS at 17:17

## 2018-05-01 RX ADMIN — HYDROCODONE BITARTRATE AND ACETAMINOPHEN 1 TABLET: 7.5; 325 TABLET ORAL at 03:59

## 2018-05-01 RX ADMIN — ONDANSETRON 4 MG: 2 INJECTION INTRAMUSCULAR; INTRAVENOUS at 23:05

## 2018-05-01 RX ADMIN — ONDANSETRON 4 MG: 2 INJECTION INTRAMUSCULAR; INTRAVENOUS at 14:59

## 2018-05-01 RX ADMIN — GABAPENTIN 800 MG: 400 CAPSULE ORAL at 05:42

## 2018-05-01 RX ADMIN — METOCLOPRAMIDE 10 MG: 10 TABLET ORAL at 08:17

## 2018-05-01 RX ADMIN — INSULIN ASPART 3 UNITS: 100 INJECTION, SOLUTION INTRAVENOUS; SUBCUTANEOUS at 11:24

## 2018-05-01 RX ADMIN — INSULIN ASPART 40 UNITS: 100 INJECTION, SOLUTION INTRAVENOUS; SUBCUTANEOUS at 17:16

## 2018-05-01 RX ADMIN — METOCLOPRAMIDE 10 MG: 10 TABLET ORAL at 11:24

## 2018-05-01 RX ADMIN — INSULIN ASPART 3 UNITS: 100 INJECTION, SOLUTION INTRAVENOUS; SUBCUTANEOUS at 20:09

## 2018-05-01 RX ADMIN — MIRTAZAPINE 45 MG: 15 TABLET, FILM COATED ORAL at 20:08

## 2018-05-01 RX ADMIN — Medication 1 TABLET: at 20:07

## 2018-05-01 RX ADMIN — ONDANSETRON 8 MG: 4 TABLET, FILM COATED ORAL at 03:58

## 2018-05-01 RX ADMIN — GABAPENTIN 800 MG: 400 CAPSULE ORAL at 13:11

## 2018-05-01 RX ADMIN — PANTOPRAZOLE SODIUM 40 MG: 40 TABLET, DELAYED RELEASE ORAL at 08:17

## 2018-05-01 RX ADMIN — HEPARIN SODIUM 5000 UNITS: 5000 INJECTION, SOLUTION INTRAVENOUS; SUBCUTANEOUS at 20:12

## 2018-05-01 RX ADMIN — INSULIN ASPART 40 UNITS: 100 INJECTION, SOLUTION INTRAVENOUS; SUBCUTANEOUS at 11:25

## 2018-05-01 RX ADMIN — METOCLOPRAMIDE 10 MG: 10 TABLET ORAL at 17:21

## 2018-05-01 NOTE — PROGRESS NOTES
[facility] Medicine Services  INPATIENT PROGRESS NOTE    Length of Stay: 0  Date of Admission: 4/30/2018  Primary Care Physician: Francisca Fong MD    Subjective   Chief Complaint: chest pain  HPI:  58-year-old  female past medical history of obesity, type 2 diabetes, coronary artery disease, hyperlipidemia, heart failure with preserved ejection fraction, restrictive lung disease, who presented on 4/30/2018 with complaints of left-sided chest pain with radiating pains into the left neck and arm.  Her pain is described as heaviness with intermittent sharp stabbing pains.  She reports that the pain was sudden in onset yesterday while resting.  She reports that the pain feels exactly the same as pains experienced when she had her previous MI.  During today's visit, the patient reports her pain is much improved.  She has reported episode of pain this morning that required administration of morphine.    Review of Systems   Respiratory: Negative for chest tightness, shortness of breath and wheezing.    Cardiovascular: Negative for chest pain, palpitations and leg swelling.   Gastrointestinal: Positive for diarrhea and nausea. Negative for abdominal pain, constipation and vomiting.   Musculoskeletal: Negative for back pain.   Skin: Negative for pallor.   Neurological: Positive for light-headedness. Negative for dizziness, syncope and weakness.   Psychiatric/Behavioral: Negative for confusion.        All pertinent negatives and positives are as above. All other systems have been reviewed and are negative unless otherwise stated.     Objective    Temp:  [96.7 °F (35.9 °C)-98.8 °F (37.1 °C)] 97.1 °F (36.2 °C)  Heart Rate:  [63-83] 68  Resp:  [18-20] 18  BP: (100-136)/(54-81) 101/54    Physical Exam   Constitutional: She is oriented to person, place, and time. She appears well-developed and well-nourished.   HENT:   Head: Normocephalic.   Eyes: Conjunctivae are normal.    Neck: Neck supple.   Cardiovascular: Normal rate, regular rhythm, normal heart sounds and intact distal pulses.    Pulmonary/Chest: Effort normal and breath sounds normal.   Abdominal: Soft. Bowel sounds are normal.   Musculoskeletal: Normal range of motion.   Neurological: She is alert and oriented to person, place, and time.   Skin: Skin is warm and dry.   Psychiatric: She has a normal mood and affect. Her behavior is normal.   Vitals reviewed.          Results Review:  I have reviewed the labs, radiology results, and diagnostic studies.    Laboratory Data:     Results from last 7 days  Lab Units 05/01/18  0553 05/01/18  0012 04/30/18 1939   SODIUM mmol/L 138 139 139   POTASSIUM mmol/L 3.9 3.6 4.1   CHLORIDE mmol/L 100 100 99   CO2 mmol/L 29.0 29.0 31.0   BUN mg/dL 15 15 15   CREATININE mg/dL 0.98 0.94 0.91   GLUCOSE mg/dL 182* 77 193*   CALCIUM mg/dL 9.1 9.4 9.4   BILIRUBIN mg/dL  --   --  0.2   ALK PHOS U/L  --   --  118   ALT (SGPT) U/L  --   --  42   AST (SGOT) U/L  --   --  45*   ANION GAP mmol/L 9.0 10.0 9.0     Estimated Creatinine Clearance: 86.1 mL/min (by C-G formula based on SCr of 0.98 mg/dL).            Results from last 7 days  Lab Units 05/01/18  0553 05/01/18 0012 04/30/18 1939   WBC 10*3/mm3 11.04* 13.24* 13.18*   HEMOGLOBIN g/dL 12.6 13.3 13.4   HEMATOCRIT % 38.0 39.5 40.3   PLATELETS 10*3/mm3 360 452* 442       Results from last 7 days  Lab Units 04/30/18 1939   INR  0.99       Culture Data:   No results found for: BLOODCX  No results found for: URINECX  No results found for: RESPCX  No results found for: WOUNDCX  No results found for: STOOLCX  No components found for: BODYFLD    Radiology Data:   Imaging Results (last 24 hours)     Procedure Component Value Units Date/Time    XR Chest 1 View [[id number]] Collected:  04/30/18 1932     Updated:  04/30/18 1949    Narrative:         EXAM:         Radiograph(s), Chest   VIEWS:   Portable ; 1       DATE/TIME:  4/30/2018 7:47 PM CDT                 INDICATION:   Chest pain protocol  chest pain protocol    COMPARISON:  CXR: 11/27/17             FINDINGS:             - lines/tubes:    none     - cardiac:         size within normal limits         - mediastinum: contour within normal limits         - lungs:         no focal air space process, pulmonary  interstitial edema, nodule(s)/mass             - pleura:         no evidence of  fluid                  - osseous:         Status post median sternotomy                  - misc.:         Impression:       CONCLUSION:        1. No evidence of an active cardiopulmonary process.                                                Electronically signed by:  ASHLEY Mejia MD  4/30/2018 7:48  PM CDT Workstation: [id number]          I have reviewed the patient current medications.     Assessment/Plan     Active Hospital Problems (** Indicates Principal Problem)    Diagnosis Date Noted   • Chest pain [R07.9] 04/30/2018   • (HFpEF) heart failure with preserved ejection fraction [I50.30] 08/27/2017   • Restrictive lung disease secondary to obesity [J98.4, E66.8] 05/01/2017   • Mixed hyperlipidemia [E78.2] 12/19/2016     Continue Statin      • MAURICIO (generalized anxiety disorder) [F41.1] 08/24/2016     Continue Lorazepam 1 mg BID PRN     • GERD without esophagitis [K21.9] 08/24/2016     Continue protonix     • 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, with long-term current use of insulin [E11.49, E11.65, Z79.4] 08/05/2016     Continue home meds  Watch for hypoglcemia   - glucose levels have been normal        Resolved Hospital Problems    Diagnosis Date Noted Date Resolved   No resolved problems to display.       Plan:    1. Chest, neck, left arm pain (possible atypical angina): cardiology following and plans for 2 day stress testing.  EKG normal, troponin levels normal.  Continue ASA, Plavix, statin, beta blocker   2. Chronic heart failure (EF 51-55% per today's echo): Continue Toprol, Lasix.  History  of orthostatic hypotension with use of ACE/ARB.  Daily weights, heart healthy diet.   3. Hx CAD: ASA, statin, Metoprolol, Plavix  4. Orthostatic hypotension  5. HLD: statin  6. Type 2 DM: FSBS AC and HS with SSI  7. Obesity:   8. Anxiety:          This document has been electronically signed by BEBE Daniel on May 1, 2018 1:00 PM

## 2018-05-01 NOTE — CONSULTS
"    [facility] [place] [facility] Consult    Referring Provider: Jordy Godfrey MD    Reason for Consultation: Chest Pain    Patient Care Team:  Francisca Fong MD as PCP - General  Zulay Dan MA as Medical Assistant  Gallo Irby    Chief complaint   Chief Complaint   Patient presents with   • Chest Pain   • Arm Pain   • Neck Pain   • Nausea       Subjective .     History of present illness:   Ms. Martinez is a patient known to Dr. Kwon and I last seen by byself in December 2017 and next appointment the end of May with Dr. Kwon. She is follow for her ASCAD, HFpEF, OH, and Mild MR/TR. She presented to the ER on 4/30/18 for chest pain that began at rest and subsided following arrival to the ER and receiving ASA. She received Nitro paste at 2117.   Her last visit in December was following hospital DC from [facility] for \"orthostatic tremors\" and dizziness. She has had negative neuro work up and normal carotids. She was treated for OH and meniere's and has done well off of the Ranexa.      Chest Pain   Pain location: left sided neck, shoulder, and arm.  Pain quality: sharp, stabbing  Pain radiates to: neck, left arm  Onset quality: suddenly while at rest  Duration: over 1 hour  Timing: began yesterday 4/30/18 while at rest  Progression: subsided  Chronicity: new  Relieved by: ASA  Worsened by: nothing.   Associated symptoms: nausea     Risk factors: arteriosclerotic heart disease, diabetes mellitus, hypercholesterolemia, hypertension, Sedentary life style and Obesity    The pt was diagnosed with CAD in 2004 and underwent PCI to LAD. She had subsequent restenosis and went for a LIMA to LAD in 2005. She had recurrent pain in summer of 2016. Repeat LHC revealed patent LIMA and a 60% OM1 lesion with a FFR of 0.93. She has been maintained on OMT.   She is currently on ASA, Plavix, Toprol and Lipitor.      Review of Systems  Review of Systems   Constitutional: Negative for diaphoresis, fatigue, fever " and unexpected weight change.   Respiratory: Negative for cough, chest tightness, shortness of breath and wheezing.    Cardiovascular: Negative for chest pain, palpitations and leg swelling.   Gastrointestinal: Positive for nausea. Negative for abdominal distention and blood in stool.   Genitourinary: Negative for hematuria.   Musculoskeletal: Negative for arthralgias and myalgias.   Skin: Negative for pallor and rash.   Neurological: Negative for dizziness, syncope and weakness.   Hematological: Does not bruise/bleed easily.   Psychiatric/Behavioral: Negative for confusion. The patient is not nervous/anxious.        History  Past Medical History:   Diagnosis Date   • Acquired hypothyroidism    • Angina, class IV    • Anxiety    • Benign paroxysmal positional vertigo    • Carpal tunnel syndrome    • Chronic pain    • Coronary atherosclerosis    • Depression    • Diabetes mellitus     Type 2, controlled   • Diabetic polyneuropathy    • Female stress incontinence    • Gastroparesis    • Hashimoto's thyroiditis    • Hyperlipidemia    • Hypertension    • Low back pain    • Malaise and fatigue    • Multiple joint pain    • Obesity     Refuses to be weighed   • Otalgia     Both   • Perforation of tympanic membrane     Left   • Postoperative wound infection    • Vitamin D deficiency    , Past Surgical History:   Procedure Laterality Date   • ABDOMINAL SURGERY     • ANGIOPLASTY      coronary   • BREAST BIOPSY Right    • CARDIAC CATHETERIZATION     • CARDIAC CATHETERIZATION N/A 6/20/2017    Procedure: Right Heart Cath;  Surgeon: Can Kwon MD PhD;  Location: [facility] CATH INVASIVE LOCATION;  Service:    • CARPAL TUNNEL RELEASE     • CHOLECYSTECTOMY     • CORONARY ARTERY BYPASS GRAFT     • ENDOSCOPY AND COLONOSCOPY     • FOOT SURGERY      Toes   • GASTRIC BANDING      Revision, laparoscopic   • HYSTERECTOMY     • MOUTH SURGERY     • SALPINGO OOPHORECTOMY     • SHOULDER SURGERY     • TRANSESOPHAGEAL ECHOCARDIOGRAM  (LAMONTE)      With color flow   , Family History   Problem Relation Age of Onset   • Diabetes Other    • Heart disease Other    • Hypertension Other    • Heart disease Mother    • Stroke Mother    • Hypertension Mother    • Diabetes Sister    • Heart disease Sister    • Hypertension Sister    • Heart disease Sister    • Diabetes Sister    • Hypertension Sister    • Diabetes Sister    • Diabetes Sister    • Diabetes Sister    • Diabetes Sister    , Social History   Substance Use Topics   • Smoking status: Never Smoker   • Smokeless tobacco: Never Used   • Alcohol use No   , Facility-Administered Medications Prior to Admission   Medication Dose Route Frequency Provider Last Rate Last Dose   • cyanocobalamin injection 1,000 mcg  1,000 mcg Intramuscular Q28 Days Francisca Fong MD   1,000 mcg at 04/06/18 1313     Prescriptions Prior to Admission   Medication Sig Dispense Refill Last Dose   • ARIPiprazole (ABILIFY) 15 MG tablet TAKE 1 TABLET BY MOUTH DAILY. 30 tablet 5 4/30/2018 at Unknown time   • aspirin 81 MG chewable tablet Chew 81 mg daily.   4/30/2018 at Unknown time   • atorvastatin (LIPITOR) 40 MG tablet TAKE 1 TABLET BY MOUTH EVERY NIGHT. 90 tablet 1 4/29/2018 at Unknown time   • Calcium Citrate-Vitamin D (CITRACAL/VITAMIN D) 250-200 MG-UNIT tablet Take 2 tablets by mouth 2 (two) times a day.   4/30/2018 at Unknown time   • clopidogrel (PLAVIX) 75 MG tablet TAKE 1 TABLET BY MOUTH DAILY. 90 tablet 3 4/30/2018 at Unknown time   • furosemide (LASIX) 40 MG tablet Take 1 tablet by mouth Daily. 90 tablet 3 4/30/2018 at Unknown time   • gabapentin (NEURONTIN) 800 MG tablet Take 1 tablet by mouth 3 (Three) Times a Day. 90 tablet 5 4/30/2018 at Unknown time   • HYDROcodone-acetaminophen (NORCO) 7.5-325 MG per tablet Take 1 tablet by mouth Every 4 (Four) Hours As Needed for Moderate Pain . 180 tablet 0 4/30/2018 at Unknown time   • Insulin Degludec (TRESIBA FLEXTOUCH) 200 UNIT/ML solution pen-injector Inject 100 Units  under the skin Every Night.   4/30/2018 at Unknown time   • metoprolol succinate XL (TOPROL-XL) 25 MG 24 hr tablet Take 1 tablet by mouth Daily. 31 tablet 13 4/30/2018 at Unknown time   • NOVOLOG FLEXPEN 100 UNIT/ML solution pen-injector sc pen INJECT 40 UNITS WITH MEALS (Patient taking differently: inject 60 units with meals) 5 pen 5 4/30/2018 at Unknown time   • ondansetron (ZOFRAN) 8 MG tablet Take 1 tablet by mouth Every 8 (Eight) Hours. (Patient taking differently: Take 8 mg by mouth Every 8 (Eight) Hours As Needed.) 90 tablet 3 Past Month at Unknown time   • pantoprazole (PROTONIX) 40 MG EC tablet TAKE 1 TABLET BY MOUTH DAILY. 90 tablet 2 4/30/2018 at Unknown time   • venlafaxine XR (EFFEXOR-XR) 150 MG 24 hr capsule TAKE ONE CAPSULE BY MOUTH TWICE A DAY FOR DEPRESSION 180 capsule 3 4/30/2018 at Unknown time   • B-D ULTRAFINE III SHORT PEN 31G X 8 MM misc USE AS DIRECTED 4 TIMES DAILY 150 each 11 Taking   • GLUCAGON EMERGENCY 1 MG injection USE AS DIRECTED AS NEEDED 1 kit 0 Taking   • LORazepam (ATIVAN) 1 MG tablet Take 1 tablet by mouth 2 (Two) Times a Day As Needed for Anxiety. 60 tablet 2 Unknown at Unknown time   • meclizine (ANTIVERT) 25 MG tablet Take 1 tablet by mouth 3 (Three) Times a Day As Needed for dizziness. 90 tablet 6 Unknown at Unknown time   • metoclopramide (REGLAN) 10 MG tablet Take 1 tablet by mouth 4 (Four) Times a Day Before Meals & at Bedtime. (Patient taking differently: Take 10 mg by mouth 4 (Four) Times a Day As Needed.) 30 tablet 0 More than a month at Unknown time   • mirtazapine (REMERON) 45 MG tablet Take 1 tablet by mouth Every Night. 90 tablet 1    • ONE TOUCH ULTRA TEST test strip USE TO TEST SUGAR 4 TIMES A  each 0    • RANEXA 500 MG 12 hr tablet TAKE 2 TABLETS BY MOUTH 2 (TWO) TIMES A DAY. 120 tablet 6 Taking   • vitamin D (ERGOCALCIFEROL) 91519 units capsule capsule TAKE ONE CAPSULE BY MOUTH EVERY SUNDAY 12 capsule 2 4/29/2018      ALLERGIES  Seroquel [quetiapine  "fumarate]; Avandia [rosiglitazone]; and Morphine and related    Objective     Vital Sign Min/Max for last 24 hours  Temp  Min: 96.7 °F (35.9 °C)  Max: 98.8 °F (37.1 °C)   BP  Min: 100/60  Max: 136/63   Pulse  Min: 63  Max: 83   Resp  Min: 18  Max: 20   SpO2  Min: 95 %  Max: 98 %   No Data Recorded   Weight  Min: 109 kg (240 lb)  Max: 117 kg (257 lb 6.4 oz)     Flowsheet Rows    Flowsheet Row First Filed Value   Admission Height 172.7 cm (68\") Documented at 04/30/2018 1857   Admission Weight 109 kg (240 lb) Documented at 04/30/2018 1857             Physical Exam:  Physical Exam   Constitutional: She is oriented to person, place, and time. She appears well-developed and well-nourished. No distress.   HENT:   Head: Normocephalic.   Eyes: Conjunctivae are normal.   Neck: No JVD present.   Cardiovascular: Normal rate, regular rhythm, S1 normal, S2 normal, normal heart sounds and intact distal pulses.  Exam reveals no gallop and no friction rub.    No murmur heard.  Pulmonary/Chest: Effort normal and breath sounds normal. No respiratory distress. She has no wheezes. She has no rales.   Abdominal: Soft. Bowel sounds are normal. She exhibits no distension.   Musculoskeletal: Normal range of motion. She exhibits no edema.   Neurological: She is alert and oriented to person, place, and time.   Skin: Skin is warm and dry. She is not diaphoretic. No erythema.   Psychiatric: She has a normal mood and affect. Her behavior is normal. Judgment and thought content normal.   Nursing note and vitals reviewed.       Results Review:     Results from last 7 days  Lab Units 05/01/18  0553 05/01/18  0012 04/30/18  1939   SODIUM mmol/L 138 139 139   POTASSIUM mmol/L 3.9 3.6 4.1   CHLORIDE mmol/L 100 100 99   CO2 mmol/L 29.0 29.0 31.0   BUN mg/dL 15 15 15   CREATININE mg/dL 0.98 0.94 0.91   CALCIUM mg/dL 9.1 9.4 9.4   BILIRUBIN mg/dL  --   --  0.2   ALK PHOS U/L  --   --  118   ALT (SGPT) U/L  --   --  42   AST (SGOT) U/L  --   --  45* "   GLUCOSE mg/dL 182* 77 193*     Estimated Creatinine Clearance: 86.1 mL/min (by C-G formula based on SCr of 0.98 mg/dL).      Results from last 7 days  Lab Units 05/01/18  0553 05/01/18  0012 04/30/18 1939   WBC 10*3/mm3 11.04* 13.24* 13.18*   HEMOGLOBIN g/dL 12.6 13.3 13.4   PLATELETS 10*3/mm3 360 452* 442       Results from last 7 days  Lab Units 04/30/18  1939   INR  0.99     Lab Results   Component Value Date    CKTOTAL 38 02/24/2018    CKMB 1.00 02/24/2018    TROPONINI <0.012   X 4 05/01/2018       I/O last 3 completed shifts:  In: 360 [P.O.:360]  Out: 700 [Urine:700]    Cardiographics      Results for orders placed in visit on 04/11/17   Adult Transthoracic Echo Limited    Narrative · Limited 2D echocardiogram. PAH-protocol  · Estimated EF appears to be in the range of 61 - 65%. Normal left   ventricular cavity size noted  · Right Ventricle: Normal right ventricular cavity size, wall thickness   and septal motion noted  · Mildly reduced right ventricular systolic function noted. RV S' velocity   is 11/2 cm/sec with a TAPSE of 1.5 with an estimated RVEF of 40-45%  · RV Strain = 15%.  · Estimated right ventricular systolic pressure from tricuspid   regurgitation is normal (<35 mmHg).  · No evidence of pulmonary hypertension is present.  · There is no evidence of pericardial effusion.        CTA chest 11/27/17  FINDINGS: No mediastinal or hilar lymphadenopathy. No aortic  aneurysm or dissection. No visible thrombus in pulmonary arterial  tree. Heart size is normal. Status post CABG. There is mild  atelectasis and/or parenchymal scarring in the right upper lobe.  No pneumothorax or pleural effusion. Tracheobronchial tree is  patent.     Pneumobilia compatible prior cholecystectomy.     Mild to moderate thoracic spondylosis.     IMPRESSION:  1. No obvious pulmonary embolus  2. No obvious acute abnormality.    Xr Chest 1 View    Result Date: 4/30/2018  CONCLUSION:    1. No evidence of an active cardiopulmonary  process.                                  Electronically signed by:  ASHLEY Mejia MD  4/30/2018 7:48 PM CDT Workstation: [id number]    STRESS 5/6/15  REASON FOR THE STRESS TEST:  Chest pain. History of atherosclerotic  coronary artery disease.     DESCRIPTION OF PROCEDURE:  With continuous hemodynamic monitoring,  following standard Lexiscan protocol, a total of 0.4 mg of Lexiscan was  infused. Patient did not have any symptoms of chest discomfort or any  diagnostic electrocardiographic changes suggestive ischemia. Patient  had normal hemodynamic response to Lexiscan infusion. Nuclear  Cardiolite isotope was injected and nuclear images were obtained.     FINAL IMPRESSION:  1.    Lexiscan monitoring period was without any symptoms of chest        discomfort or any electrocardiographic changes suggestive        ischemia.  2.    Normal hemodynamic response.  3.    Nuclear Cardiolite image pending.    TEOFILO MOREAU MD    Carotids 2/10/16  FINDINGS- On the right, peak systolic internal carotid artery velocity  72.5 cm/s. ICA over CCA ratio 1.1, no stenosis.     On the left, peak systolic internal carotid artery velocity 133.7   cm/s. ICA over CCA ratio 1.7, no stenosis. The flow velocities within  each internal carotid artery are within normal limits. Each vertebral  artery has antegrade flow.      Minimal partially calcified heterogenous plaque at both carotid  bifurcations.      CONCLUSION- No hemodynamically significant stenoses are identified  within either internal carotid artery.    [facility] 6/20/2017  FINDINGS:   Body surface area: 2.2             Ao pressure:                131/71                         MAP: 93                         O2 sat on room air 98%     Right heart pressures:  RA:                 14/31 with a mean of 19                                    RV:                 41/10 with a mean of 16                                    PA:                  40/10 with a mean of  26                                    PCWP:            17                                            DP     Resistance:  SVR:               1517 dynes · sec/cm5              PVR: 143 dynes · sec/cm5     Saturations: N/A     Cardiac Outputs:  Thermal CO: 3.9  Thermal CI: 1.7     RV Status:  RVSW:  6.7 gm-m/beat  RVSWI:            51 gm-m/m2/beat     Medications:  1. 2% Lidocaine  2.  2 mg Zofran  3.  25 µg Fentanyl  4.  2 mg Versed     Procedure Logistics:   Fluoro: 0.4 minutes     Structural:  RA pressure is elevated. With inspiration there is adequate inspiratory decrease.  RV diastolic pressure is normal.  PA pressure is mildly elevated.  Wedge pressure is elevated. Pressures measured in both right and left wedge positions are similiar.        IMPRESSION:   1.  Pulmonary venous hypertension  2.  Elevated left-sided filling pressures consistent with HFpEF  3.  Elevated right-sided filling pressures     [facility] 16  FINAL IMPRESSION:   1. 100% occlusion of the left anterior descending artery after the origin of the diagonal branch with a patent LIMA to the distal left anterior descending artery which was a thin tapering vessel.   2. Proximal circumflex artery with 40% to 50% stenosis and 30% to 40% stenosis in the ostium of the 1st major diagonal branch.   3. First obtuse marginal branch with 60% to 65% stenosis.   4. Proximal right coronary artery with 50% to 60% stenosis followed by a sequential 30% stenosis.   5. Patent LIMA to the LAD with a distal left anterior descending artery which was a thin tapering vessel.   6. Preserved left ventricular systolic function with an ejection fraction of 55% to 60%. RECOMMENDATION: Due to the patient ongoing symptoms of chest pain with right coronary artery with 50% to 60% stenosis and the circumflex obtuse marginal branch with 60% to 65% stenosis the patient was recommended FFR evaluation of the circumflex obtuse marginal branch.   FFR EVALUATION OF THE CIRCUMFLEX  ARTERY: As a continuation of the left heart catheterization using a 6-[place]ander, left Maki guiding catheter, selective cannulation was done of the left coronary artery and an angiogram was performed which confirmed the stenosis. FFR wire was equalized and normalized and advanced into the circumflex obtuse marginal branch. Adenosine infusion was initiated. Angiomax bolus was given and the procedure was continued when the ACT was greater than 200 milliseconds. FFR evaluation of the obtuse marginal branch stenosis revealed a value 0.93 which was not hemodynamically significant. The FFR wire was retrieved into the guiding catheter and an angiogram was performed which confirmed the above findings.   FINAL IMPRESSION: Negative FFR evaluation of the obtuse marginal branch stenosis which revealed a value 0.94 which is not hemodynamically significant.  RECOMMENDATION: Patient was recommended medical management for the coronary artery disease and to work up noncardiac etiology of chest pain and aggressive risk factor modification.   The arterial sheaths were pulled and closed with an Angio-Seal closure device and patient was transferred to the floor in stable condition.   TEOFILO MOREAU MD Electronically Signed 06/22/2016 18:37:12     Intake/Output       04/30/18 0700 - 05/01/18 0659 05/01/18 0700 - 05/02/18 0659    Intake (ml) 360 0    Output (ml) 700 0    Net (ml) -340 0    Last Weight  117 kg (257 lb 6.4 oz)  --            Assessment/Plan     Active Problems:    Chest pain  - Pain characteristic: atypical angina. Not made worse with exertion. No chest pain since arrival.   Ischemic evaluation:ordered.  The patient is not able to exercise. EKG is Normal. Troponin negative x 4.  Risks/Benefits discussed. I also reminded her of the findings of the 2016 [facility] and the possibility of a positive stress requiring a [facility] to evaluate.     Ischemia evaluation with 2 day myocardial perfusion study. Rest images today if possible.  Stress tomorrow.     HFpEF. NYHA stage C; FC-II.  Today, she is euvolemic and well-perfused.  - Toprol 25mg  - Lasix 40mg daily  - No ACE/ARB due to OH     2. ASCAD. EF: 50%  last documented 11/2016. CCS class I. The patient has been revascularized with LIMA to LAD. The patient has incomplete revascularization. Anatomy with significant obstruction: circumflex. Last FFR (6/2016) was negative at 0.94.  - ASA, Plavix, Atorvastatin  - Toprol XL  - Ranexa discontinued in December due to dizziness.      3. Orthostatic Hypotension  - DC'd HCTZ  - Not on any apparent BP lowering medications  - Will Ranexa  - Attempted holding Lasix but she developed swelling and restarted it. She has tolerated this fine.      Meclizine 25mg 3 times a day as needed for Mèniére's.      4. Mild MR/TR. NYHA stage B; FC-I.       I discussed the patients findings and my recommendations with patient, family and primary care team and Dr. Kwon          This document has been electronically signed by BEBE Abbott on May 1, 2018 8:35 AM

## 2018-05-01 NOTE — CONSULTS
Adult Nutrition  Assessment    Patient Name:  Ariana Martinez  YOB: 1960  MRN: [id number]  Admit Date:  4/30/2018    Assessment Date:  5/1/2018    Comments:  Pt with dx Heart Failure.  Mild Sodium Restricted Diet info used to provide ed regarding Wt Loss Diet and diet copy given.  BMI  93 with pt at 183% IBW.  Making Lifestyle Choices for a Healthier Weight used to provide ed regarding Wt Loss Diet and diet copy given.          Adult Nutrition Assessment     Row Name 05/01/18 1122       Calculation Measurements    Weight Used For Calculations 76.9 kg (169 lb 8.5 oz)       Estimated/Assessed Needs    Additional Documentation Calorie Requirements (Group);Fluid Requirements (Group);Emmons-St. Jeor Equation (Group);Protein Requirements (Group)       Calorie Requirements    Estimated Calorie Requirement (kcal/day) 1829    Estimated Calorie Need Method Emmons-St Jeor       KCAL/KG    14 Kcal/Kg (kcal) 1076.6    15 Kcal/Kg (kcal) 1153.5    18 Kcal/Kg (kcal) 1384.2    20 Kcal/Kg (kcal) 1538    25 Kcal/Kg (kcal) 1922.5    30 Kcal/Kg (kcal) 2307    35 Kcal/Kg (kcal) 2691.5    40 Kcal/Kg (kcal) 3076    45 Kcal/Kg (kcal) 3460.5    50 Kcal/Kg (kcal) 3845       Emmons-St. Jeor Equation    RMR (Emmons-St. Jeor Equation) 1407.5       Protein Requirements    Est Protein Requirement Amount (gms/kg) 1.2 gm protein    Estimated Protein Requirements (gms/day) 92.28       Fluid Requirements    Estimated Fluid Requirements (mL/day) 2307    RDA Method (mL) 2307    Candace-Segar Method (over 20 kg) 3038    Row Name 05/01/18 1121       Reason for Assessment    Reason For Assessment per organizational policy   Low Sodium Wt Loss diet    Diagnosis cardiac disease   dx Heart Failure, dx Obesity    Identified At Risk by Screening Criteria need for education          Electronically signed by:  Merle Morales RD  05/01/18 11:24 AM

## 2018-05-01 NOTE — ED PROVIDER NOTES
Subjective   58 year old female with concurrent medical history of T2DM, anxiety, hyperlipidemia, hypertension, obesity, and CAD s/p stent placement 13 years ago for MI presents with 1 hour of left sided neck, shoulder, and arm pain. The pain started at rest and is similar to pain she had prior to her MI 13 years ago. She has also been having nausea x 3 days. The pain is described as sharp, stabbing and radiates from her neck down into her arm. She is also having left sided chest pain with this as well with some shortness of breath but no diaphoresis. Last heart cath was in June 2017 and showed pulmonary hypertension. Last echo was in May 2017 which showed EF of 61-65%.     She did not take any thing to relieve the pain and it is not exacerbated by anything.         History provided by:  Patient      Review of Systems   Constitutional: Negative for activity change, appetite change, chills, fatigue and fever.   HENT: Negative for hearing loss, sneezing, sore throat and trouble swallowing.    Eyes: Negative for visual disturbance.   Respiratory: Positive for shortness of breath. Negative for cough and chest tightness.    Cardiovascular: Positive for chest pain. Negative for palpitations and leg swelling.   Gastrointestinal: Positive for nausea. Negative for abdominal pain, blood in stool, constipation, diarrhea and vomiting.   Genitourinary: Negative for difficulty urinating.   Musculoskeletal: Negative for back pain.        Left arm and neck pain   Skin: Negative for pallor and rash.   Allergic/Immunologic: Negative for environmental allergies and food allergies.   Neurological: Negative for dizziness, light-headedness, numbness and headaches.   Psychiatric/Behavioral: Negative for agitation, confusion, hallucinations and suicidal ideas.       Past Medical History:   Diagnosis Date   • Acquired hypothyroidism    • Angina, class IV    • Anxiety    • Benign paroxysmal positional vertigo    • Carpal tunnel syndrome    •  Chronic pain    • Coronary atherosclerosis    • Depression    • Diabetes mellitus     Type 2, controlled   • Diabetic polyneuropathy    • Female stress incontinence    • Gastroparesis    • Hashimoto's thyroiditis    • Hyperlipidemia    • Hypertension    • Low back pain    • Malaise and fatigue    • Multiple joint pain    • Obesity     Refuses to be weighed   • Otalgia     Both   • Perforation of tympanic membrane     Left   • Postoperative wound infection    • Vitamin D deficiency        Allergies   Allergen Reactions   • Seroquel [Quetiapine Fumarate] Anaphylaxis   • Avandia [Rosiglitazone] Swelling   • Morphine And Related Hallucinations     If patient takes too much       Past Surgical History:   Procedure Laterality Date   • ABDOMINAL SURGERY     • ANGIOPLASTY      coronary   • BREAST BIOPSY Right    • CARDIAC CATHETERIZATION     • CARDIAC CATHETERIZATION N/A 6/20/2017    Procedure: Right Heart Cath;  Surgeon: Can Kwon MD PhD;  Location: [facility] INVASIVE LOCATION;  Service:    • CARPAL TUNNEL RELEASE     • CHOLECYSTECTOMY     • CORONARY ARTERY BYPASS GRAFT     • ENDOSCOPY AND COLONOSCOPY     • FOOT SURGERY      Toes   • GASTRIC BANDING      Revision, laparoscopic   • HYSTERECTOMY     • MOUTH SURGERY     • SALPINGO OOPHORECTOMY     • SHOULDER SURGERY     • TRANSESOPHAGEAL ECHOCARDIOGRAM (LAMONTE)      With color flow       Family History   Problem Relation Age of Onset   • Diabetes Other    • Heart disease Other    • Hypertension Other    • Heart disease Mother    • Stroke Mother    • Hypertension Mother    • Diabetes Sister    • Heart disease Sister    • Hypertension Sister    • Heart disease Sister    • Diabetes Sister    • Hypertension Sister    • Diabetes Sister    • Diabetes Sister    • Diabetes Sister    • Diabetes Sister        Social History     Social History   • Marital status:      Social History Main Topics   • Smoking status: Never Smoker   • Smokeless tobacco: Never Used   •  Alcohol use No   • Drug use: No   • Sexual activity: Defer      Comment:      Other Topics Concern   • Not on file           Objective   Physical Exam   Constitutional: She is oriented to person, place, and time. She appears well-developed and well-nourished. No distress.   HENT:   Head: Normocephalic and atraumatic.   Right Ear: External ear normal.   Left Ear: External ear normal.   Eyes: Pupils are equal, round, and reactive to light. No scleral icterus.   Neck: Normal range of motion. Neck supple. No thyromegaly present.   Cardiovascular: Normal rate, regular rhythm and normal heart sounds.    Pulmonary/Chest: Effort normal and breath sounds normal. No respiratory distress.       Abdominal: Soft. Bowel sounds are normal. She exhibits no distension. There is no tenderness.   Musculoskeletal: Normal range of motion. She exhibits no edema.   Lymphadenopathy:     She has no cervical adenopathy.   Neurological: She is alert and oriented to person, place, and time. She has normal reflexes. No cranial nerve deficit.   Skin: Skin is warm and dry. She is not diaphoretic. No erythema.   Psychiatric: She has a normal mood and affect. Her behavior is normal.       ECG 12 Lead    Date/Time: 4/30/2018 7:53 PM  Performed by: REJI AC  Authorized by: ROBIN MONTENEGRO   Interpreted by physician  Comparison: compared with previous ECG from 2/24/2018  Similar to previous ECG  Rhythm: sinus rhythm  Rate: normal  QRS axis: normal  Conduction: conduction normal  ST Segments: ST segments normal  T Waves: T waves normal  Clinical impression: normal ECG          Lab Results (last 24 hours)     Procedure Component Value Units Date/Time    BNP [[id number]]  (Normal) Collected:  04/30/18 1939    Specimen:  Blood Updated:  04/30/18 2012     proBNP 98.9 pg/mL     Troponin [[id number]]  (Normal) Collected:  04/30/18 1939    Specimen:  Blood Updated:  04/30/18 2012     Troponin I <0.012 ng/mL     Protime-INR [[id number]]   (Normal) Collected:  04/30/18 1939    Specimen:  Blood Updated:  04/30/18 2005     Protime 12.9 Seconds      INR 0.99    Narrative:       Therapeutic range for most indications is 2.0-3.0 INR,  or 2.5-3.5 for mechanical heart valves.    Comprehensive Metabolic Panel [[id number]]  (Abnormal) Collected:  04/30/18 1939    Specimen:  Blood Updated:  04/30/18 2000     Glucose 193 (H) mg/dL      BUN 15 mg/dL      Creatinine 0.91 mg/dL      Sodium 139 mmol/L      Potassium 4.1 mmol/L      Chloride 99 mmol/L      CO2 31.0 mmol/L      Calcium 9.4 mg/dL      Total Protein 7.3 g/dL      Albumin 4.00 g/dL      ALT (SGPT) 42 U/L      AST (SGOT) 45 (H) U/L      Alkaline Phosphatase 118 U/L      Total Bilirubin 0.2 mg/dL      eGFR Non African Amer 64 mL/min/1.73      Globulin 3.3 gm/dL      A/G Ratio 1.2 g/dL      BUN/Creatinine Ratio 16.5     Anion Gap 9.0 mmol/L     CBC & Differential [[id number]] Collected:  04/30/18 1939    Specimen:  Blood Updated:  04/30/18 1952    Narrative:       The following orders were created for panel order CBC & Differential.  Procedure                               Abnormality         Status                     ---------                               -----------         ------                     CBC Auto Differential[[id number]]        Abnormal            Final result                 Please view results for these tests on the individual orders.    CBC Auto Differential [[id number]]  (Abnormal) Collected:  04/30/18 1939    Specimen:  Blood Updated:  04/30/18 1952     WBC 13.18 (H) 10*3/mm3      RBC 4.69 10*6/mm3      Hemoglobin 13.4 g/dL      Hematocrit 40.3 %      MCV 85.9 fL      MCH 28.6 pg      MCHC 33.3 g/dL      RDW 14.5 %      RDW-SD 44.6 fl      MPV 8.9 fL      Platelets 442 10*3/mm3      Neutrophil % 72.6 %      Lymphocyte % 20.1 %      Monocyte % 4.6 %      Eosinophil % 2.1 %      Basophil % 0.2 %      Immature Grans % 0.4 %      Neutrophils, Absolute 9.57 (H) 10*3/mm3      Lymphocytes,  Absolute 2.65 10*3/mm3      Monocytes, Absolute 0.60 10*3/mm3      Eosinophils, Absolute 0.28 10*3/mm3      Basophils, Absolute 0.03 10*3/mm3      Immature Grans, Absolute 0.05 (H) 10*3/mm3     Light Blue Top [[id number]] Collected:  04/30/18 1939    Specimen:  Blood Updated:  04/30/18 1947    Lavender Top [[id number]] Collected:  04/30/18 1939    Specimen:  Blood Updated:  04/30/18 1947    Gold Top - SST [[id number]] Collected:  04/30/18 1939    Specimen:  Blood Updated:  04/30/18 1947    [place] Draw [[id number]] Collected:  04/30/18 1939    Specimen:  Blood Updated:  04/30/18 1947    Narrative:       The following orders were created for panel order [place] Draw.  Procedure                               Abnormality         Status                     ---------                               -----------         ------                     Light Blue Top[[id number]]                                   In process                 Green Top (Gel)[[id number]]                                  In process                 Lavender Top[[id number]]                                     In process                 Gold Top - SST[[id number]]                                   In process                   Please view results for these tests on the individual orders.    Green Top (Gel) [[id number]] Collected:  04/30/18 1939    Specimen:  Blood Updated:  04/30/18 1947        Imaging Results (last 24 hours)     Procedure Component Value Units Date/Time    XR Chest 1 View [[id number]] Collected:  04/30/18 1932     Updated:  04/30/18 1949    Narrative:         EXAM:         Radiograph(s), Chest   VIEWS:   Portable ; 1       DATE/TIME:  4/30/2018 7:47 PM CDT                INDICATION:   Chest pain protocol  chest pain protocol    COMPARISON:  CXR: 11/27/17             FINDINGS:             - lines/tubes:    none     - cardiac:         size within normal limits         - mediastinum: contour within normal limits         - lungs:         no focal air  space process, pulmonary  interstitial edema, nodule(s)/mass             - pleura:         no evidence of  fluid                  - osseous:         Status post median sternotomy                  - misc.:         Impression:       CONCLUSION:        1. No evidence of an active cardiopulmonary process.                                                Electronically signed by:  ASHLEY Mejia MD  4/30/2018 7:48  PM CDT Workstation: [id number]            ED Course  ED Course      On arrival to ED, labs and imaging were obtained with results as above. She was given a baby aspirin while in ER which had mild improvement of chest pain. IV access was obtained. She was given zofran 4 mg IV with some relief of nausea so another 4 mg IV was given. Due to continued chest pain she was given nitropaste 1 inch. She was admitted for chest pain observation with the hospitalist team. Dr. Esteves accepted admission.        HEART Score (for prediction of 6-week risk of major adverse cardiac event) reviewed and/or performed as part of the patient evaluation and treatment planning process.  The result associated with this review/performance is: 4       MDM  Number of Diagnoses or Management Options  Chest pain, unspecified type: new and requires workup     Amount and/or Complexity of Data Reviewed  Clinical lab tests: reviewed  Tests in the radiology section of CPT®: reviewed  Tests in the medicine section of CPT®: reviewed    Patient Progress  Patient progress: stable      Final diagnoses:   Chest pain, unspecified type       This document has been electronically signed by Laquita Hernandez MD PGY1 on April 30, 2018 8:42 PM         Laquita Hernandez MD  Resident  04/30/18 2042

## 2018-05-01 NOTE — PLAN OF CARE
Problem: Patient Care Overview  Goal: Plan of Care Review  Outcome: Ongoing (interventions implemented as appropriate)  Mild Sodium Restricted Diet and Making Lifestyle Choices for a Healthier Weight used to provide ed regarding Low Sodium Wt Loss Diet and diet copies given.   05/01/18 1131   Coping/Psychosocial   Plan of Care Reviewed With patient   Plan of Care Review   Progress no change   OTHER   Outcome Summary initial visit

## 2018-05-01 NOTE — PROGRESS NOTES
Discharge Planning Assessment  [facility]     Patient Name: Ariana Martinez  MRN: [id number]  Today's Date: 5/1/2018    Admit Date: 4/30/2018          Discharge Needs Assessment     Row Name 05/01/18 1510       Living Environment    Lives With spouse    Current Living Arrangements home/apartment/condo    Primary Care Provided by self    Provides Primary Care For no one    Family Caregiver if Needed spouse    Quality of Family Relationships helpful;supportive    Able to Return to Prior Arrangements yes    Living Arrangement Comments Pt resides at home with spouse. Pt reports good support system.       Resource/Environmental Concerns    Resource/Environmental Concerns none    Transportation Concerns car, none       Transition Planning    Patient/Family Anticipates Transition to home with family    Patient/Family Anticipated Services at Transition none    Transportation Anticipated car, drives self;family or friend will provide       Discharge Needs Assessment    Concerns to be Addressed adjustment to diagnosis/illness    Equipment Currently Used at Home none    Anticipated Changes Related to Illness none    Current Discharge Risk chronically ill            Discharge Plan     Row Name 05/01/18 8660       Plan    Plan Comments LSW assessment complete. Pt resides at home with spouse. pt has good support system. pt reports being independent prior to hospitalization. pt plans to return home at d/c and did not anticipate any needs at this time. Pt informed LSW that she used home health services 13 yrs ago following her CABG. LSW awaiting recomendations from MD.  LSW/case mgt will follow up as consulted and complete arrangements as ordered.    Row Name 05/01/18 6161       Plan    Plan Comments LSW attempted to complete LACE assessment however pt is gone to [place]. LSW/case mgt will follow up at a later time.        Destination     No service coordination in this encounter.      Durable Medical Equipment     No service  coordination in this encounter.      Dialysis/Infusion     No service coordination in this encounter.      Home Medical Care     No service coordination in this encounter.      Social Care     No service coordination in this encounter.        Expected Discharge Date and Time     Expected Discharge Date Expected Discharge Time    May 2, 2018               Demographic Summary     Row Name 05/01/18 1509       General Information    Admission Type observation    Referral Source high risk screening    Reason for Consult discharge planning    Preferred Language English     Used During This Interaction no       Contact Information    Contact Information Obtained for             Functional Status     Row Name 05/01/18 1509       Functional Status    Usual Activity Tolerance good    Current Activity Tolerance moderate       Functional Status, IADL    Medications independent    Meal Preparation independent    Housekeeping independent    Laundry independent    Shopping independent       Mental Status Summary    Recent Changes in Mental Status/Cognitive Functioning no changes            Psychosocial    No documentation.           Abuse/Neglect    No documentation.           Legal    No documentation.           Substance Abuse    No documentation.           Patient Forms    No documentation.         PARAMJIT Mancia

## 2018-05-01 NOTE — H&P
[facility] Medicine Admission      Date of Admission: 4/30/2018      Primary Care Physician: Francisca Fong MD      Chief Complaint:  Chest pain    HPI:  Patient states that on the morning of admission she had nausea, vomiting, and diarrhea.  She did not have fever or chills associated with the GI symptoms.  Those symptoms subsided and she developed left sided neck, shoulder, and arm pain.  She never had true chest pain.  She states that these symptoms are exactly the same as she had when she required CABG 13 years ago.  She has not had these symptoms since that time.  The neck discomfort started gradually while she was sitting on the couch.  She denies shortness of breath or palpitations.  There were no alleviating or exacerbating factors.  At the time of my interview she was pain free.      Concurrent Medical History:  has a past medical history of Acquired hypothyroidism; Angina, class IV; Anxiety; Benign paroxysmal positional vertigo; Carpal tunnel syndrome; Chronic pain; Coronary atherosclerosis; Depression; Diabetes mellitus; Diabetic polyneuropathy; Female stress incontinence; Gastroparesis; Hashimoto's thyroiditis; Hyperlipidemia; Hypertension; Low back pain; Malaise and fatigue; Multiple joint pain; Obesity; Otalgia; Perforation of tympanic membrane; Postoperative wound infection; and Vitamin D deficiency.    Past Surgical History:  has a past surgical history that includes Abdominal surgery; Angioplasty; Coronary artery bypass graft; Cardiac catheterization; Carpal tunnel release; Cholecystectomy; endoscopy and colonoscopy; Mouth surgery; transesophageal echocardiogram ([place]); Foot surgery; gastric banding; Hysterectomy; Shoulder surgery; Salpingoophorectomy; Cardiac catheterization (N/A, 6/20/2017); and Breast biopsy (Right).    Family History: family history includes Diabetes in her other, sister, sister, sister, sister, sister, and sister; Heart disease  in her mother, other, sister, and sister; Hypertension in her mother, other, sister, and sister; Stroke in her mother.     Social History:  reports that she has never smoked. She has never used smokeless tobacco. She reports that she does not drink alcohol or use drugs.    Allergies:   Allergies   Allergen Reactions   • Seroquel [Quetiapine Fumarate] Anaphylaxis   • Avandia [Rosiglitazone] Swelling   • Morphine And Related Hallucinations     If patient takes too much       Medications:   Prior to Admission medications    Medication Sig Start Date End Date Taking? Authorizing Provider   ARIPiprazole (ABILIFY) 15 MG tablet TAKE 1 TABLET BY MOUTH DAILY. 3/6/18  Yes Francisca Fong MD   aspirin 81 MG chewable tablet Chew 81 mg daily.   Yes Historical Provider, MD   atorvastatin (LIPITOR) 40 MG tablet TAKE 1 TABLET BY MOUTH EVERY NIGHT. 3/19/18  Yes BEBE Prince   Calcium Citrate-Vitamin D (CITRACAL/VITAMIN D) 250-200 MG-UNIT tablet Take 2 tablets by mouth 2 (two) times a day.   Yes Historical Provider, MD   clopidogrel (PLAVIX) 75 MG tablet TAKE 1 TABLET BY MOUTH DAILY. 9/13/17  Yes Francisca Fong MD   furosemide (LASIX) 40 MG tablet Take 1 tablet by mouth Daily. 3/1/18  Yes Francisca Fong MD   gabapentin (NEURONTIN) 800 MG tablet Take 1 tablet by mouth 3 (Three) Times a Day. 2/5/18  Yes Francisca Fong MD   HYDROcodone-acetaminophen (NORCO) 7.5-325 MG per tablet Take 1 tablet by mouth Every 4 (Four) Hours As Needed for Moderate Pain . 4/6/18  Yes Francisca Fnog MD   Insulin Degludec (TRESIBA FLEXTOUCH) 200 UNIT/ML solution pen-injector Inject 100 Units under the skin Every Night.   Yes Historical Provider, MD   metoprolol succinate XL (TOPROL-XL) 25 MG 24 hr tablet Take 1 tablet by mouth Daily. 4/11/17  Yes Can Kwon MD PhD   NOVOLOG FLEXPEN 100 UNIT/ML solution pen-injector sc pen INJECT 40 UNITS WITH MEALS  Patient taking differently: inject 60 units with meals  12/12/17  Yes BEBE Prince   ondansetron (ZOFRAN) 8 MG tablet Take 1 tablet by mouth Every 8 (Eight) Hours.  Patient taking differently: Take 8 mg by mouth Every 8 (Eight) Hours As Needed. 3/7/18  Yes Francisca Fong MD   pantoprazole (PROTONIX) 40 MG EC tablet TAKE 1 TABLET BY MOUTH DAILY. 10/4/17  Yes Francisca Fong MD   venlafaxine XR (EFFEXOR-XR) 150 MG 24 hr capsule TAKE ONE CAPSULE BY MOUTH TWICE A DAY FOR DEPRESSION 10/20/17  Yes Francisca Fong MD   B-D ULTRAFINE III SHORT PEN 31G X 8 MM misc USE AS DIRECTED 4 TIMES DAILY 9/11/17   Baldemar Godinez MD   GLUCAGON EMERGENCY 1 MG injection USE AS DIRECTED AS NEEDED 7/28/17   BEBE Prince   LORazepam (ATIVAN) 1 MG tablet Take 1 tablet by mouth 2 (Two) Times a Day As Needed for Anxiety. 2/5/18   Francisca Fong MD   meclizine (ANTIVERT) 25 MG tablet Take 1 tablet by mouth 3 (Three) Times a Day As Needed for dizziness. 12/14/17   BEBE Mathias   metoclopramide (REGLAN) 10 MG tablet Take 1 tablet by mouth 4 (Four) Times a Day Before Meals & at Bedtime.  Patient taking differently: Take 10 mg by mouth 4 (Four) Times a Day As Needed. 2/24/18   CHELSEA Atkinson   mirtazapine (REMERON) 45 MG tablet Take 1 tablet by mouth Every Night. 2/5/18   Francisca Fong MD   ONE TOUCH ULTRA TEST test strip USE TO TEST SUGAR 4 TIMES A DAY 4/27/18   Francisca Fong MD   RANEXA 500 MG 12 hr tablet TAKE 2 TABLETS BY MOUTH 2 (TWO) TIMES A DAY. 10/30/17   Can Kwon MD PhD   vitamin D (ERGOCALCIFEROL) 03547 units capsule capsule TAKE ONE CAPSULE BY MOUTH EVERY SUNDAY 12/27/17   Francisca Fong MD   Insulin Degludec 200 UNIT/ML solution pen-injector Inject 100 Units under the skin Daily.  Patient taking differently: Inject 100 Units under the skin Every Night. 5/12/17 4/30/18  BEBE Prince   Insulin Glargine (TOUJEO SOLOSTAR) 300 UNIT/ML solution pen-injector Inject 120 Units  under the skin Daily. 4/25/18 4/30/18  BEBE Prince   phenazopyridine (PYRIDIUM) 100 MG tablet Take 1 tablet by mouth 3 (Three) Times a Day As Needed for bladder spasms. 1/6/18 4/30/18  Court Pablo MD       Review of Systems:  Review of Systems   Constitutional: Negative for appetite change, chills, fatigue, fever and unexpected weight change.   HENT: Negative for congestion, facial swelling, hearing loss, nosebleeds, rhinorrhea, sneezing, trouble swallowing and voice change.    Eyes: Negative for photophobia and visual disturbance.   Respiratory: Negative for apnea, cough, choking, chest tightness, shortness of breath, wheezing and stridor.    Cardiovascular: Negative for chest pain, palpitations and leg swelling.   Gastrointestinal: Positive for diarrhea, nausea and vomiting. Negative for abdominal pain, blood in stool and constipation.   Endocrine: Negative for cold intolerance, heat intolerance, polydipsia, polyphagia and polyuria.   Genitourinary: Negative for dysuria, flank pain and hematuria.   Musculoskeletal: Positive for neck pain. Negative for arthralgias, back pain and myalgias.        Left shoulder and left arm pain   Skin: Negative for rash and wound.   Neurological: Negative for dizziness, seizures, syncope, speech difficulty, weakness, light-headedness, numbness and headaches.   Hematological: Does not bruise/bleed easily.   Psychiatric/Behavioral: Negative for agitation, behavioral problems, confusion, decreased concentration, hallucinations, self-injury and suicidal ideas. The patient is not nervous/anxious.       Otherwise complete ROS is negative except as mentioned above.    Physical Exam:   Temp:  [98.8 °F (37.1 °C)] 98.8 °F (37.1 °C)  Heart Rate:  [83] 83  Resp:  [18] 18  BP: (126)/(81) 126/81     Physical Exam      Results Reviewed:  I have personally reviewed current lab, radiology, and data and agree with results.  Lab Results (last 24 hours)     Procedure Component Value  Units Date/Time    BNP [[id number]]  (Normal) Collected:  04/30/18 1939    Specimen:  Blood Updated:  04/30/18 2012     proBNP 98.9 pg/mL     Troponin [[id number]]  (Normal) Collected:  04/30/18 1939    Specimen:  Blood Updated:  04/30/18 2012     Troponin I <0.012 ng/mL     Protime-INR [[id number]]  (Normal) Collected:  04/30/18 1939    Specimen:  Blood Updated:  04/30/18 2005     Protime 12.9 Seconds      INR 0.99    Narrative:       Therapeutic range for most indications is 2.0-3.0 INR,  or 2.5-3.5 for mechanical heart valves.    Comprehensive Metabolic Panel [[id number]]  (Abnormal) Collected:  04/30/18 1939    Specimen:  Blood Updated:  04/30/18 2000     Glucose 193 (H) mg/dL      BUN 15 mg/dL      Creatinine 0.91 mg/dL      Sodium 139 mmol/L      Potassium 4.1 mmol/L      Chloride 99 mmol/L      CO2 31.0 mmol/L      Calcium 9.4 mg/dL      Total Protein 7.3 g/dL      Albumin 4.00 g/dL      ALT (SGPT) 42 U/L      AST (SGOT) 45 (H) U/L      Alkaline Phosphatase 118 U/L      Total Bilirubin 0.2 mg/dL      eGFR Non African Amer 64 mL/min/1.73      Globulin 3.3 gm/dL      A/G Ratio 1.2 g/dL      BUN/Creatinine Ratio 16.5     Anion Gap 9.0 mmol/L     CBC & Differential [[id number]] Collected:  04/30/18 1939    Specimen:  Blood Updated:  04/30/18 1952    Narrative:       The following orders were created for panel order CBC & Differential.  Procedure                               Abnormality         Status                     ---------                               -----------         ------                     CBC Auto Differential[[id number]]        Abnormal            Final result                 Please view results for these tests on the individual orders.    CBC Auto Differential [[id number]]  (Abnormal) Collected:  04/30/18 1939    Specimen:  Blood Updated:  04/30/18 1952     WBC 13.18 (H) 10*3/mm3      RBC 4.69 10*6/mm3      Hemoglobin 13.4 g/dL      Hematocrit 40.3 %      MCV 85.9 fL      MCH 28.6 pg      MCHC 33.3  g/dL      RDW 14.5 %      RDW-SD 44.6 fl      MPV 8.9 fL      Platelets 442 10*3/mm3      Neutrophil % 72.6 %      Lymphocyte % 20.1 %      Monocyte % 4.6 %      Eosinophil % 2.1 %      Basophil % 0.2 %      Immature Grans % 0.4 %      Neutrophils, Absolute 9.57 (H) 10*3/mm3      Lymphocytes, Absolute 2.65 10*3/mm3      Monocytes, Absolute 0.60 10*3/mm3      Eosinophils, Absolute 0.28 10*3/mm3      Basophils, Absolute 0.03 10*3/mm3      Immature Grans, Absolute 0.05 (H) 10*3/mm3     Light Blue Top [[id number]] Collected:  04/30/18 1939    Specimen:  Blood Updated:  04/30/18 1947    Lavender Top [[id number]] Collected:  04/30/18 1939    Specimen:  Blood Updated:  04/30/18 1947    Gold Top - SST [[id number]] Collected:  04/30/18 1939    Specimen:  Blood Updated:  04/30/18 1947    [place] Draw [[id number]] Collected:  04/30/18 1939    Specimen:  Blood Updated:  04/30/18 1947    Narrative:       The following orders were created for panel order [place] Draw.  Procedure                               Abnormality         Status                     ---------                               -----------         ------                     Light Blue Top[[id number]]                                   In process                 Green Top (Gel)[[id number]]                                  In process                 Lavender Top[[id number]]                                     In process                 Gold Top - SST[[id number]]                                   In process                   Please view results for these tests on the individual orders.    Green Top (Gel) [[id number]] Collected:  04/30/18 1939    Specimen:  Blood Updated:  04/30/18 1947        Imaging Results (last 24 hours)     Procedure Component Value Units Date/Time    XR Chest 1 View [[id number]] Collected:  04/30/18 1932     Updated:  04/30/18 1949    Narrative:         EXAM:         Radiograph(s), Chest   VIEWS:   Portable ; 1       DATE/TIME:  4/30/2018 7:47 PM CDT                 INDICATION:   Chest pain protocol  chest pain protocol    COMPARISON:  CXR: 11/27/17             FINDINGS:             - lines/tubes:    none     - cardiac:         size within normal limits         - mediastinum: contour within normal limits         - lungs:         no focal air space process, pulmonary  interstitial edema, nodule(s)/mass             - pleura:         no evidence of  fluid                  - osseous:         Status post median sternotomy                  - misc.:         Impression:       CONCLUSION:        1. No evidence of an active cardiopulmonary process.                                                Electronically signed by:  ASHLEY Mejia MD  4/30/2018 7:48  PM CDT Workstation: [id number]            Assessment:              Plan:  1.  Neck and shoulder pain:  Possible anginal equivalent.  On ASA, plavix, lipitor, metoprolol, and ranexa at home.  Will continue.  Will get last set of cardiac enzymes.  I have discussed the patient with Dr. Kwon, her primary cardiologist, and he has agreed to see her in consultation.    2.  DM:  Will give levemir, novolog with meals and SSI.  Will not give full home dose initially, but will uptitrate if she remains hyperglycemic.  3.  HTN:  Continue home medications.  4.  DVT Prophylaxis:  Heparin   5.  GI Prophylaxis:  Protonix    I discussed the patients findings and my recommendations with:  patient        This document has been electronically signed by Mahin Esteves MD on April 30, 2018 8:33 PM

## 2018-05-01 NOTE — PLAN OF CARE
Problem: Fall Risk (Adult)  Goal: Identify Related Risk Factors and Signs and Symptoms  Outcome: Ongoing (interventions implemented as appropriate)    Goal: Absence of Fall  Outcome: Ongoing (interventions implemented as appropriate)      Problem: Patient Care Overview  Goal: Plan of Care Review  Outcome: Ongoing (interventions implemented as appropriate)  Pt cooperative with care,   05/01/18 0426   Coping/Psychosocial   Plan of Care Reviewed With patient   Plan of Care Review   Progress no change       Problem: Cardiac: ACS (Acute Coronary Syndrome) (Adult)  Goal: Signs and Symptoms of Listed Potential Problems Will be Absent, Minimized or Managed (Cardiac: ACS)  Outcome: Ongoing (interventions implemented as appropriate)  No cp at this time

## 2018-05-01 NOTE — ED NOTES
Pt started having left sided neck, shoulder and arm pain at about 1730 this evening.  Pt is also nauseous and had one episode of diarrhea prior to coming to the ER.  Pt states the pain is similar to when she had to have open heart surgery in 2005.     Isabela Anderson RN  04/30/18 1948

## 2018-05-02 ENCOUNTER — APPOINTMENT (OUTPATIENT)
Dept: CARDIOLOGY | Facility: HOSPITAL | Age: 58
End: 2018-05-02

## 2018-05-02 ENCOUNTER — APPOINTMENT (OUTPATIENT)
Dept: NUCLEAR MEDICINE | Facility: HOSPITAL | Age: 58
End: 2018-05-02

## 2018-05-02 VITALS
OXYGEN SATURATION: 96 % | SYSTOLIC BLOOD PRESSURE: 111 MMHG | DIASTOLIC BLOOD PRESSURE: 65 MMHG | RESPIRATION RATE: 18 BRPM | TEMPERATURE: 97.4 F | HEIGHT: 68 IN | HEART RATE: 84 BPM | WEIGHT: 260.2 LBS | BODY MASS INDEX: 39.43 KG/M2

## 2018-05-02 LAB
ANION GAP SERPL CALCULATED.3IONS-SCNC: 10 MMOL/L (ref 5–15)
BASOPHILS # BLD AUTO: 0.05 10*3/MM3 (ref 0–0.2)
BASOPHILS NFR BLD AUTO: 0.4 % (ref 0–2)
BH CV STRESS BP STAGE 1: NORMAL
BH CV STRESS COMMENTS STAGE 1: NORMAL
BH CV STRESS DOSE REGADENOSON STAGE 1: 0.4
BH CV STRESS DURATION MIN STAGE 1: 0
BH CV STRESS DURATION SEC STAGE 1: 10
BH CV STRESS HR STAGE 1: 80
BH CV STRESS PROTOCOL 1: NORMAL
BH CV STRESS RECOVERY BP: NORMAL MMHG
BH CV STRESS RECOVERY HR: 88 BPM
BH CV STRESS STAGE 1: 1
BUN BLD-MCNC: 16 MG/DL (ref 7–21)
BUN/CREAT SERPL: 16.7 (ref 7–25)
CALCIUM SPEC-SCNC: 9.1 MG/DL (ref 8.4–10.2)
CHLORIDE SERPL-SCNC: 102 MMOL/L (ref 95–110)
CO2 SERPL-SCNC: 28 MMOL/L (ref 22–31)
CREAT BLD-MCNC: 0.96 MG/DL (ref 0.5–1)
DEPRECATED RDW RBC AUTO: 44.7 FL (ref 36.4–46.3)
EOSINOPHIL # BLD AUTO: 0.41 10*3/MM3 (ref 0–0.7)
EOSINOPHIL NFR BLD AUTO: 3.3 % (ref 0–7)
ERYTHROCYTE [DISTWIDTH] IN BLOOD BY AUTOMATED COUNT: 14.3 % (ref 11.5–14.5)
GFR SERPL CREATININE-BSD FRML MDRD: 60 ML/MIN/1.73 (ref 51–120)
GLUCOSE BLD-MCNC: 193 MG/DL (ref 60–100)
GLUCOSE BLDC GLUCOMTR-MCNC: 185 MG/DL (ref 70–130)
GLUCOSE BLDC GLUCOMTR-MCNC: 280 MG/DL (ref 70–130)
HCT VFR BLD AUTO: 40.7 % (ref 35–45)
HGB BLD-MCNC: 13.4 G/DL (ref 12–15.5)
IMM GRANULOCYTES # BLD: 0.09 10*3/MM3 (ref 0–0.02)
IMM GRANULOCYTES NFR BLD: 0.7 % (ref 0–0.5)
LV EF NUC BP: 70 %
LYMPHOCYTES # BLD AUTO: 3.44 10*3/MM3 (ref 0.6–4.2)
LYMPHOCYTES NFR BLD AUTO: 27.8 % (ref 10–50)
MAXIMAL PREDICTED HEART RATE: 164 BPM
MCH RBC QN AUTO: 28.3 PG (ref 26.5–34)
MCHC RBC AUTO-ENTMCNC: 32.9 G/DL (ref 31.4–36)
MCV RBC AUTO: 86 FL (ref 80–98)
MONOCYTES # BLD AUTO: 0.93 10*3/MM3 (ref 0–0.9)
MONOCYTES NFR BLD AUTO: 7.5 % (ref 0–12)
NEUTROPHILS # BLD AUTO: 7.47 10*3/MM3 (ref 2–8.6)
NEUTROPHILS NFR BLD AUTO: 60.3 % (ref 37–80)
NRBC BLD MANUAL-RTO: 0 /100 WBC (ref 0–0)
PERCENT MAX PREDICTED HR: 53.66 %
PLATELET # BLD AUTO: 389 10*3/MM3 (ref 150–450)
PMV BLD AUTO: 9.8 FL (ref 8–12)
POTASSIUM BLD-SCNC: 5.4 MMOL/L (ref 3.5–5.1)
RBC # BLD AUTO: 4.73 10*6/MM3 (ref 3.77–5.16)
SODIUM BLD-SCNC: 140 MMOL/L (ref 137–145)
STRESS BASELINE BP: NORMAL MMHG
STRESS BASELINE HR: 80 BPM
STRESS PERCENT HR: 63 %
STRESS POST ESTIMATED WORKLOAD: 1 METS
STRESS POST PEAK BP: NORMAL MMHG
STRESS POST PEAK HR: 88 BPM
STRESS TARGET HR: 139 BPM
WBC NRBC COR # BLD: 12.39 10*3/MM3 (ref 3.2–9.8)

## 2018-05-02 PROCEDURE — 80048 BASIC METABOLIC PNL TOTAL CA: CPT | Performed by: HOSPITALIST

## 2018-05-02 PROCEDURE — 93017 CV STRESS TEST TRACING ONLY: CPT

## 2018-05-02 PROCEDURE — 63710000001 INSULIN ASPART PER 5 UNITS: Performed by: HOSPITALIST

## 2018-05-02 PROCEDURE — 0 TECHNETIUM SESTAMIBI: Performed by: NURSE PRACTITIONER

## 2018-05-02 PROCEDURE — G0378 HOSPITAL OBSERVATION PER HR: HCPCS

## 2018-05-02 PROCEDURE — A9500 TC99M SESTAMIBI: HCPCS | Performed by: NURSE PRACTITIONER

## 2018-05-02 PROCEDURE — 78452 HT MUSCLE IMAGE SPECT MULT: CPT | Performed by: INTERNAL MEDICINE

## 2018-05-02 PROCEDURE — 96372 THER/PROPH/DIAG INJ SC/IM: CPT

## 2018-05-02 PROCEDURE — 92610 EVALUATE SWALLOWING FUNCTION: CPT | Performed by: SPEECH-LANGUAGE PATHOLOGIST

## 2018-05-02 PROCEDURE — 85025 COMPLETE CBC W/AUTO DIFF WBC: CPT | Performed by: HOSPITALIST

## 2018-05-02 PROCEDURE — 25010000002 ONDANSETRON PER 1 MG: Performed by: NURSE PRACTITIONER

## 2018-05-02 PROCEDURE — 25010000002 REGADENOSON 0.4 MG/5ML SOLUTION: Performed by: NURSE PRACTITIONER

## 2018-05-02 PROCEDURE — G8998 SWALLOW D/C STATUS: HCPCS | Performed by: SPEECH-LANGUAGE PATHOLOGIST

## 2018-05-02 PROCEDURE — G8997 SWALLOW GOAL STATUS: HCPCS | Performed by: SPEECH-LANGUAGE PATHOLOGIST

## 2018-05-02 PROCEDURE — G8996 SWALLOW CURRENT STATUS: HCPCS | Performed by: SPEECH-LANGUAGE PATHOLOGIST

## 2018-05-02 PROCEDURE — 82962 GLUCOSE BLOOD TEST: CPT

## 2018-05-02 PROCEDURE — 99225 PR SBSQ OBSERVATION CARE/DAY 25 MINUTES: CPT | Performed by: INTERNAL MEDICINE

## 2018-05-02 PROCEDURE — 78452 HT MUSCLE IMAGE SPECT MULT: CPT

## 2018-05-02 PROCEDURE — 93018 CV STRESS TEST I&R ONLY: CPT | Performed by: INTERNAL MEDICINE

## 2018-05-02 PROCEDURE — 93016 CV STRESS TEST SUPVJ ONLY: CPT | Performed by: INTERNAL MEDICINE

## 2018-05-02 PROCEDURE — 96376 TX/PRO/DX INJ SAME DRUG ADON: CPT

## 2018-05-02 PROCEDURE — 25010000002 HEPARIN (PORCINE) PER 1000 UNITS: Performed by: HOSPITALIST

## 2018-05-02 RX ORDER — 0.9 % SODIUM CHLORIDE 0.9 %
10 VIAL (ML) INJECTION AS NEEDED
Status: DISCONTINUED | OUTPATIENT
Start: 2018-05-02 | End: 2018-05-02 | Stop reason: HOSPADM

## 2018-05-02 RX ADMIN — HEPARIN SODIUM 5000 UNITS: 5000 INJECTION, SOLUTION INTRAVENOUS; SUBCUTANEOUS at 05:28

## 2018-05-02 RX ADMIN — TECHNETIUM TC 99M SESTAMIBI 1 DOSE: 1 INJECTION INTRAVENOUS at 09:09

## 2018-05-02 RX ADMIN — HYDROCODONE BITARTRATE AND ACETAMINOPHEN 1 TABLET: 7.5; 325 TABLET ORAL at 15:43

## 2018-05-02 RX ADMIN — INSULIN ASPART 40 UNITS: 100 INJECTION, SOLUTION INTRAVENOUS; SUBCUTANEOUS at 11:42

## 2018-05-02 RX ADMIN — FUROSEMIDE 40 MG: 40 TABLET ORAL at 13:25

## 2018-05-02 RX ADMIN — PANTOPRAZOLE SODIUM 40 MG: 40 TABLET, DELAYED RELEASE ORAL at 13:25

## 2018-05-02 RX ADMIN — INSULIN ASPART 3 UNITS: 100 INJECTION, SOLUTION INTRAVENOUS; SUBCUTANEOUS at 08:07

## 2018-05-02 RX ADMIN — METOCLOPRAMIDE 10 MG: 10 TABLET ORAL at 11:42

## 2018-05-02 RX ADMIN — LORAZEPAM 1 MG: 1 TABLET ORAL at 12:43

## 2018-05-02 RX ADMIN — HEPARIN SODIUM 5000 UNITS: 5000 INJECTION, SOLUTION INTRAVENOUS; SUBCUTANEOUS at 14:28

## 2018-05-02 RX ADMIN — HYDROCODONE BITARTRATE AND ACETAMINOPHEN 1 TABLET: 7.5; 325 TABLET ORAL at 10:46

## 2018-05-02 RX ADMIN — ONDANSETRON 4 MG: 2 INJECTION INTRAMUSCULAR; INTRAVENOUS at 10:46

## 2018-05-02 RX ADMIN — Medication 1 TABLET: at 13:20

## 2018-05-02 RX ADMIN — CLOPIDOGREL BISULFATE 75 MG: 75 TABLET ORAL at 13:25

## 2018-05-02 RX ADMIN — GABAPENTIN 800 MG: 400 CAPSULE ORAL at 05:28

## 2018-05-02 RX ADMIN — INSULIN ASPART 8 UNITS: 100 INJECTION, SOLUTION INTRAVENOUS; SUBCUTANEOUS at 10:53

## 2018-05-02 RX ADMIN — ARIPIPRAZOLE 15 MG: 15 TABLET ORAL at 13:00

## 2018-05-02 RX ADMIN — ONDANSETRON 4 MG: 2 INJECTION INTRAMUSCULAR; INTRAVENOUS at 16:06

## 2018-05-02 RX ADMIN — GABAPENTIN 800 MG: 400 CAPSULE ORAL at 14:28

## 2018-05-02 RX ADMIN — VENLAFAXINE HYDROCHLORIDE 150 MG: 75 CAPSULE, EXTENDED RELEASE ORAL at 13:28

## 2018-05-02 RX ADMIN — SODIUM CHLORIDE, PRESERVATIVE FREE 10 ML: 5 INJECTION INTRAVENOUS at 09:09

## 2018-05-02 RX ADMIN — METOPROLOL SUCCINATE 25 MG: 25 TABLET, EXTENDED RELEASE ORAL at 13:25

## 2018-05-02 RX ADMIN — REGADENOSON 0.4 MG: 0.08 INJECTION, SOLUTION INTRAVENOUS at 09:09

## 2018-05-02 NOTE — PLAN OF CARE
Problem: Patient Care Overview  Goal: Plan of Care Review  Outcome: Ongoing (interventions implemented as appropriate)   05/02/18 4796   Coping/Psychosocial   Plan of Care Reviewed With patient   Plan of Care Review   Progress improving   OTHER   Outcome Summary bedside swallow eval completed this date 2* pt c/o globus sensation todoay at noon meal. pt has no oral or pharyngeal s/s of aspiration noted and suspects that her anxiety may be causing this sensation. slp reviewed reflux precautions and discussed with pt and nsg to monitor in the future. at this time, pt is not appropriate for intervention and pt should continue to eat regular foods with upright posture, cyclic eating, and small bites/sips.

## 2018-05-02 NOTE — PLAN OF CARE
Problem: Fall Risk (Adult)  Goal: Identify Related Risk Factors and Signs and Symptoms  Outcome: Ongoing (interventions implemented as appropriate)    Goal: Absence of Fall  Outcome: Ongoing (interventions implemented as appropriate)      Problem: Patient Care Overview  Goal: Plan of Care Review  Outcome: Ongoing (interventions implemented as appropriate)  Pt denied soa or cp throughout the shift and cooperative with call light usage while bed exiting.   05/02/18 0453   Coping/Psychosocial   Plan of Care Reviewed With patient   Plan of Care Review   Progress improving       Problem: Cardiac: ACS (Acute Coronary Syndrome) (Adult)  Goal: Signs and Symptoms of Listed Potential Problems Will be Absent, Minimized or Managed (Cardiac: ACS)  Outcome: Ongoing (interventions implemented as appropriate)

## 2018-05-02 NOTE — DISCHARGE SUMMARY
[facility] Medicine Services  DISCHARGE SUMMARY       Date of Admission: 4/30/2018  Date of Discharge:  5/2/2018  Primary Care Physician: Francisca Fong MD    Presenting Problem/History of Present Illness:  Chest pain, unspecified type [R07.9]       Final Discharge Diagnoses:  Hospital Problem List     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, with long-term current use of insulin    Overview Addendum 11/28/2017  8:04 AM by True Sharma MD     Continue home meds  Watch for hypoglcemia   - glucose levels have been normal         MAURICIO (generalized anxiety disorder)    Overview Addendum 8/28/2017  7:53 AM by Daniela Lennon MD     Continue Lorazepam 1 mg BID PRN         GERD without esophagitis    Overview Signed 8/27/2017 10:34 PM by Ethel Cooley MD     Continue protonix         Mixed hyperlipidemia    Overview Signed 8/27/2017 10:33 PM by Ethel Cooley MD     Continue Statin          Restrictive lung disease secondary to obesity    (HFpEF) heart failure with preserved ejection fraction    Overview Deleted 5/1/2018  1:24 PM by BEBE Daniel            Chest pain          Consults:   Consults     Date and Time Order Name Status Description    4/30/2018 2356 Inpatient Cardiology Consult Completed           Procedures Performed:                 Pertinent Test Results:   Lab Results (last 24 hours)     Procedure Component Value Units Date/Time    POC Glucose Once [[id number]]  (Abnormal) Collected:  05/02/18 1046    Specimen:  Blood Updated:  05/02/18 1058     Glucose 280 (H) mg/dL      Comment: RN NotifiedMeter: [id number]: [id number] HUSK JACKIE       Basic Metabolic Panel [[id number]]  (Abnormal) Collected:  05/02/18 0744    Specimen:  Blood Updated:  05/02/18 0845     Glucose 193 (H) mg/dL      BUN 16 mg/dL      Creatinine 0.96 mg/dL      Sodium 140 mmol/L      Potassium 5.4 (H) mmol/L      Chloride 102 mmol/L       CO2 28.0 mmol/L      Calcium 9.1 mg/dL      eGFR Non African Amer 60 mL/min/1.73      BUN/Creatinine Ratio 16.7     Anion Gap 10.0 mmol/L     CBC & Differential [[id number]] Collected:  05/02/18 0708    Specimen:  Blood Updated:  05/02/18 0738    Narrative:       The following orders were created for panel order CBC & Differential.  Procedure                               Abnormality         Status                     ---------                               -----------         ------                     CBC Auto Differential[[id number]]        Abnormal            Final result                 Please view results for these tests on the individual orders.    CBC Auto Differential [[id number]]  (Abnormal) Collected:  05/02/18 0708    Specimen:  Blood Updated:  05/02/18 0738     WBC 12.39 (H) 10*3/mm3      RBC 4.73 10*6/mm3      Hemoglobin 13.4 g/dL      Hematocrit 40.7 %      MCV 86.0 fL      MCH 28.3 pg      MCHC 32.9 g/dL      RDW 14.3 %      RDW-SD 44.7 fl      MPV 9.8 fL      Platelets 389 10*3/mm3      Neutrophil % 60.3 %      Lymphocyte % 27.8 %      Monocyte % 7.5 %      Eosinophil % 3.3 %      Basophil % 0.4 %      Immature Grans % 0.7 (H) %      Neutrophils, Absolute 7.47 10*3/mm3      Lymphocytes, Absolute 3.44 10*3/mm3      Monocytes, Absolute 0.93 (H) 10*3/mm3      Eosinophils, Absolute 0.41 10*3/mm3      Basophils, Absolute 0.05 10*3/mm3      Immature Grans, Absolute 0.09 (H) 10*3/mm3      nRBC 0.0 /100 WBC     POC Glucose Once [[id number]]  (Abnormal) Collected:  05/02/18 0651    Specimen:  Blood Updated:  05/02/18 0712     Glucose 185 (H) mg/dL      Comment: RN NotifiedSliding Scale AdminMeter: [id number]: [id number] SHANNA       POC Glucose Once [[id number]]  (Abnormal) Collected:  05/01/18 1949    Specimen:  Blood Updated:  05/01/18 2051     Glucose 165 (H) mg/dL      Comment: RN NotifiedMeter: [id number]: [id number] JACKIE HINOJOSA       POC Glucose Once [[id number]]  (Abnormal)  Collected:  05/01/18 1624    Specimen:  Blood Updated:  05/01/18 1647     Glucose 172 (H) mg/dL      Comment: RN NotifiedMeter: [id number]: [id number] SHAISTA MORA           Imaging Results (all)     Procedure Component Value Units Date/Time    XR Chest 1 View [[id number]] Collected:  04/30/18 1932     Updated:  04/30/18 1949    Narrative:         EXAM:         Radiograph(s), Chest   VIEWS:   Portable ; 1       DATE/TIME:  4/30/2018 7:47 PM CDT                INDICATION:   Chest pain protocol  chest pain protocol    COMPARISON:  CXR: 11/27/17             FINDINGS:             - lines/tubes:    none     - cardiac:         size within normal limits         - mediastinum: contour within normal limits         - lungs:         no focal air space process, pulmonary  interstitial edema, nodule(s)/mass             - pleura:         no evidence of  fluid                  - osseous:         Status post median sternotomy                  - misc.:         Impression:       CONCLUSION:        1. No evidence of an active cardiopulmonary process.                                                Electronically signed by:  ASHLEY Mejia MD  4/30/2018 7:48  PM CDT Workstation: [id number]            Chief Complaint on Day of Discharge: none    Hospital Course:  58-year-old  female with past medical history of obesity, type 2 diabetes, coronary artery disease, hyperlipidemia, heart failure with preserved ejection fraction, restrictive lung disease who presented on 4/30/2018 with complaints of left-sided chest pain with radiating pains into the left neck.  The patient was kept for further observation and evaluated by cardiology.  During her stay she had negative cardiac enzymes and no signs of ischemia on EKG or telemetry monitoring.  Due to her multiple risk factors and history of coronary artery disease, she was evaluated by both BEBE De Los Santos and Dr. Kwon with cardiology.  The patient underwent 2 day  "stress testing protocol and stress test results revealed no EKG evidence of myocardial ischemia and normal myocardial perfusion study with no evidence of ischemia as well.  Ejection fraction per stress test was 70% and study was noted to be a low risk study.  Patient was cleared from cardiology standpoint.  She'll be discharged home in stable condition with instructions to follow-up with her primary care provider which she has scheduled appointment in 2 days.  She will also need to keep her scheduled appointment on 5/31/2018 with Dr. Kwon.    Condition on Discharge:  stable    Physical Exam on Discharge:  /70 (BP Location: Left arm, Patient Position: Lying)   Pulse 84   Temp 97.3 °F (36.3 °C) (Temporal Artery )   Resp 18   Ht 172.7 cm (68\")   Wt 118 kg (260 lb 3.2 oz)   SpO2 99%   BMI 39.56 kg/m²   Physical Exam   Constitutional: She is oriented to person, place, and time. She appears well-developed and well-nourished.   HENT:   Head: Normocephalic.   Eyes: Conjunctivae are normal.   Neck: Neck supple.   Cardiovascular: Normal rate, regular rhythm, normal heart sounds and intact distal pulses.    Pulmonary/Chest: Effort normal and breath sounds normal.   Abdominal: Soft. Bowel sounds are normal.   Musculoskeletal: Normal range of motion. She exhibits no edema.   Neurological: She is alert and oriented to person, place, and time.   Skin: Skin is warm and dry.   Psychiatric: She has a normal mood and affect. Her behavior is normal.   Vitals reviewed.        Discharge Disposition:      Discharge Medications:   Ariana Martinez   Home Medication Instructions ZOILA:[id number]    Printed on:05/02/18 4479   Medication Information                      ARIPiprazole (ABILIFY) 15 MG tablet  TAKE 1 TABLET BY MOUTH DAILY.             aspirin 81 MG chewable tablet  Chew 81 mg daily.             atorvastatin (LIPITOR) 40 MG tablet  TAKE 1 TABLET BY MOUTH EVERY NIGHT.             B-D ULTRAFINE III SHORT PEN 31G " X 8 MM misc  USE AS DIRECTED 4 TIMES DAILY             Calcium Citrate-Vitamin D (CITRACAL/VITAMIN D) 250-200 MG-UNIT tablet  Take 2 tablets by mouth 2 (two) times a day.             clopidogrel (PLAVIX) 75 MG tablet  TAKE 1 TABLET BY MOUTH DAILY.             furosemide (LASIX) 40 MG tablet  Take 1 tablet by mouth Daily.             gabapentin (NEURONTIN) 800 MG tablet  Take 1 tablet by mouth 3 (Three) Times a Day.             GLUCAGON EMERGENCY 1 MG injection  USE AS DIRECTED AS NEEDED             HYDROcodone-acetaminophen (NORCO) 7.5-325 MG per tablet  Take 1 tablet by mouth Every 4 (Four) Hours As Needed for Moderate Pain .             Insulin Degludec (TRESIBA FLEXTOUCH) 200 UNIT/ML solution pen-injector  Inject 100 Units under the skin Every Night.             LORazepam (ATIVAN) 1 MG tablet  Take 1 tablet by mouth 2 (Two) Times a Day As Needed for Anxiety.             meclizine (ANTIVERT) 25 MG tablet  Take 1 tablet by mouth 3 (Three) Times a Day As Needed for dizziness.             metoclopramide (REGLAN) 10 MG tablet  Take 1 tablet by mouth 4 (Four) Times a Day Before Meals & at Bedtime.             metoprolol succinate XL (TOPROL-XL) 25 MG 24 hr tablet  Take 1 tablet by mouth Daily.             mirtazapine (REMERON) 45 MG tablet  Take 1 tablet by mouth Every Night.             NOVOLOG FLEXPEN 100 UNIT/ML solution pen-injector sc pen  INJECT 40 UNITS WITH MEALS             ondansetron (ZOFRAN) 8 MG tablet  Take 1 tablet by mouth Every 8 (Eight) Hours.             ONE TOUCH ULTRA TEST test strip  USE TO TEST SUGAR 4 TIMES A DAY             pantoprazole (PROTONIX) 40 MG EC tablet  TAKE 1 TABLET BY MOUTH DAILY.             RANEXA 500 MG 12 hr tablet  TAKE 2 TABLETS BY MOUTH 2 (TWO) TIMES A DAY.             venlafaxine XR (EFFEXOR-XR) 150 MG 24 hr capsule  TAKE ONE CAPSULE BY MOUTH TWICE A DAY FOR DEPRESSION             vitamin D (ERGOCALCIFEROL) 25196 units capsule capsule  TAKE ONE CAPSULE BY MOUTH EVERY  SUNDAY                 Discharge Diet: heart healthy, ADA    Activity at Discharge: as tolerated.    Discharge Care Plan/Instructions: Follow up with PCP within one week.  Keep scheduled follow up with Dr. Kwon on 5/31/18    Follow-up Appointments:   Future Appointments  Date Time Provider Department Center   5/4/2018 8:00 AM Francisca Fong MD [facility] POW None   5/31/2018 2:15 PM Can Kwon MD PhD [facility] MAD None   7/25/2018 9:30 AM BEBE Prince [facility] None       Test Results Pending at Discharge:           This document has been electronically signed by BEBE Daniel on May 2, 2018 2:59 PM

## 2018-05-02 NOTE — PROGRESS NOTES
[facility] Medicine Services  INPATIENT PROGRESS NOTE    Length of Stay: 0  Date of Admission: 4/30/2018  Primary Care Physician: Francisca Fong MD    Subjective   Chief Complaint: chest pain  HPI:  58-year-old  female past medical history of obesity, type 2 diabetes, coronary artery disease, hyperlipidemia, heart failure with preserved ejection fraction, restrictive lung disease, who presented on 4/30/2018 with complaints of left-sided chest pain with radiating pains into the left neck and arm.  Her pain is described as heaviness with intermittent sharp stabbing pains.  She reports that the pain was sudden in onset yesterday while resting.  She reports that the pain feels exactly the same as pains experienced when she had her previous MI.  During today's visit, the patient reports experiencing the same chest pain, nausea, and lightheadedness as before during the stress test, but at this time is currently chest pain free.     Review of Systems   Respiratory: Negative for chest tightness, shortness of breath and wheezing.    Cardiovascular: Negative for chest pain, palpitations and leg swelling.   Gastrointestinal: Positive for nausea. Negative for abdominal pain, constipation, diarrhea and vomiting.   Musculoskeletal: Negative for back pain.   Skin: Negative for pallor.   Neurological: Positive for light-headedness. Negative for dizziness, syncope and weakness.   Psychiatric/Behavioral: Negative for confusion.        All pertinent negatives and positives are as above. All other systems have been reviewed and are negative unless otherwise stated.     Objective    Temp:  [97.3 °F (36.3 °C)-97.8 °F (36.6 °C)] 97.3 °F (36.3 °C)  Heart Rate:  [75-90] 84  Resp:  [18] 18  BP: (106-122)/(56-70) 122/70    Physical Exam   Constitutional: She is oriented to person, place, and time. She appears well-developed and well-nourished.   HENT:   Head: Normocephalic.   Eyes:  Conjunctivae are normal.   Neck: Neck supple.   Cardiovascular: Normal rate, regular rhythm, normal heart sounds and intact distal pulses.    Pulmonary/Chest: Effort normal and breath sounds normal.   Abdominal: Soft. Bowel sounds are normal.   Musculoskeletal: Normal range of motion.   Neurological: She is alert and oriented to person, place, and time.   Skin: Skin is warm and dry.   Psychiatric: She has a normal mood and affect. Her behavior is normal.   Vitals reviewed.          Results Review:  I have reviewed the labs, radiology results, and diagnostic studies.    Laboratory Data:     Results from last 7 days  Lab Units 05/02/18  0744 05/01/18  0553 05/01/18 0012 04/30/18 1939   SODIUM mmol/L 140 138 139 139   POTASSIUM mmol/L 5.4* 3.9 3.6 4.1   CHLORIDE mmol/L 102 100 100 99   CO2 mmol/L 28.0 29.0 29.0 31.0   BUN mg/dL 16 15 15 15   CREATININE mg/dL 0.96 0.98 0.94 0.91   GLUCOSE mg/dL 193* 182* 77 193*   CALCIUM mg/dL 9.1 9.1 9.4 9.4   BILIRUBIN mg/dL  --   --   --  0.2   ALK PHOS U/L  --   --   --  118   ALT (SGPT) U/L  --   --   --  42   AST (SGOT) U/L  --   --   --  45*   ANION GAP mmol/L 10.0 9.0 10.0 9.0     Estimated Creatinine Clearance: 88.3 mL/min (by C-G formula based on SCr of 0.96 mg/dL).            Results from last 7 days  Lab Units 05/02/18  0708 05/01/18 0553 05/01/18 0012 04/30/18 1939   WBC 10*3/mm3 12.39* 11.04* 13.24* 13.18*   HEMOGLOBIN g/dL 13.4 12.6 13.3 13.4   HEMATOCRIT % 40.7 38.0 39.5 40.3   PLATELETS 10*3/mm3 389 360 452* 442       Results from last 7 days  Lab Units 04/30/18 1939   INR  0.99       Culture Data:   No results found for: BLOODCX  No results found for: URINECX  No results found for: RESPCX  No results found for: WOUNDCX  No results found for: STOOLCX  No components found for: BODYFLD    Radiology Data:   Imaging Results (last 24 hours)     ** No results found for the last 24 hours. **          I have reviewed the patient current medications.     Assessment/Plan        Active Hospital Problems (** Indicates Principal Problem)    Diagnosis Date Noted   • Chest pain [R07.9] 04/30/2018   • (HFpEF) heart failure with preserved ejection fraction [I50.30] 08/27/2017   • Restrictive lung disease secondary to obesity [J98.4, E66.8] 05/01/2017   • Mixed hyperlipidemia [E78.2] 12/19/2016     Continue Statin      • MAURICIO (generalized anxiety disorder) [F41.1] 08/24/2016     Continue Lorazepam 1 mg BID PRN     • GERD without esophagitis [K21.9] 08/24/2016     Continue protonix     • 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, with long-term current use of insulin [E11.49, E11.65, Z79.4] 08/05/2016     Continue home meds  Watch for hypoglcemia   - glucose levels have been normal        Resolved Hospital Problems    Diagnosis Date Noted Date Resolved   No resolved problems to display.       Plan:    1. Chest, neck, left arm pain (possible atypical angina): cardiology following and plans for 2 day stress testing.  (Day #2 of imaging today.  Results pending).  EKG normal, troponin levels normal.  Continue ASA, Plavix, statin, beta blocker   2. Chronic heart failure (EF 51-55% per echo): Continue Toprol, Lasix.  History of orthostatic hypotension with use of ACE/ARB.  Daily weights, heart healthy diet.   3. Hx CAD: ASA, statin, Metoprolol, Plavix  4. Orthostatic hypotension  5. HLD: statin  6. Type 2 DM: FSBS AC and HS with SSI  7. Obesity:   8. Anxiety:    Discharge plan depending on results of Lexiscan stress testing.         This document has been electronically signed by BEBE Daniel on May 2, 2018 1:43 PM

## 2018-05-02 NOTE — THERAPY DISCHARGE NOTE
Acute Care - Speech Language Pathology   Swallow Eval/Discharge [facility]     Patient Name: Ariana Martinez  : 1960  MRN: [id number]  Today's Date: 2018               Admit Date: 2018  bedside swallow eval completed this date 2* pt c/o globus sensation todoay at noon meal. pt has no oral or pharyngeal s/s of aspiration noted and suspects that her anxiety may be causing this sensation. slp reviewed reflux precautions and discussed with pt and nsg to monitor in the future. at this time, pt is not appropriate for intervention and pt should continue to eat regular foods with upright posture, cyclic eating, and small bites/sips.   Visit Dx:    ICD-10-CM ICD-9-CM   1. Chest pain, unspecified type R07.9 786.50   2. Chronic epigastric pain 2/2 gastroparesis R10.13 789.06    G89.29 338.29   3. Pharyngeal dysphagia R13.13 787.23     Patient Active Problem List   Diagnosis   • 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, with long-term current use of insulin   • Closed nondisplaced fracture of fifth left metatarsal bone   • MAURICIO (generalized anxiety disorder)   • Depression, major, recurrent, moderate   • GERD without esophagitis   • Long term prescription opiate use   • Chronic pain of multiple joints   • Mixed hyperlipidemia   • Vitamin D deficiency   • Seasonal allergic rhinitis   • Orthostatic hypotension   • Restrictive lung disease secondary to obesity   • Snoring   • (HFpEF) heart failure with preserved ejection fraction   • Diabetic peripheral neuropathy associated with type 2 diabetes mellitus   • Acute renal failure superimposed on stage 3 chronic kidney disease   • B12 deficiency   • Chronic epigastric pain 2/2 gastroparesis   • Class 3 obesity due to excess calories without serious comorbidity with body mass index (BMI) of 40.0 to 44.9 in adult   • Chest pain     Past Medical History:   Diagnosis Date   • Acquired hypothyroidism    • Angina, class IV    • Anxiety    •  Benign paroxysmal positional vertigo    • Carpal tunnel syndrome    • Chronic pain    • Coronary atherosclerosis    • Depression    • Diabetes mellitus     Type 2, controlled   • Diabetic polyneuropathy    • Female stress incontinence    • Gastroparesis    • Hashimoto's thyroiditis    • Hyperlipidemia    • Hypertension    • Low back pain    • Malaise and fatigue    • Multiple joint pain    • Obesity     Refuses to be weighed   • Otalgia     Both   • Perforation of tympanic membrane     Left   • Postoperative wound infection    • Vitamin D deficiency      Past Surgical History:   Procedure Laterality Date   • ABDOMINAL SURGERY     • ANGIOPLASTY      coronary   • BREAST BIOPSY Right    • CARDIAC CATHETERIZATION     • CARDIAC CATHETERIZATION N/A 6/20/2017    Procedure: Right Heart Cath;  Surgeon: Can Kwon MD PhD;  Location: [facility] INVASIVE LOCATION;  Service:    • CARPAL TUNNEL RELEASE     • CHOLECYSTECTOMY     • CORONARY ARTERY BYPASS GRAFT     • ENDOSCOPY AND COLONOSCOPY     • FOOT SURGERY      Toes   • GASTRIC BANDING      Revision, laparoscopic   • HYSTERECTOMY     • MOUTH SURGERY     • SALPINGO OOPHORECTOMY     • SHOULDER SURGERY     • TRANSESOPHAGEAL ECHOCARDIOGRAM (LAMONTE)      With color flow          SWALLOW EVALUATION (last 72 hours)      SLP Adult Swallow Evaluation     Row Name 05/02/18 1410                   Rehab Evaluation    Document Type discharge evaluation/summary  -EC        Total Evaluation Minutes, SLP 30  -EC        Subjective Information no complaints  -EC        Patient Observations alert;cooperative;agree to therapy  -EC        Patient Effort excellent  -EC           General Information    Patient Profile Reviewed yes  -EC        Pertinent History Of Current Problem globus sensastion today at lunch after stress test  -EC        Current Method of Nutrition regular textures;thin liquids  -EC        Plans/Goals Discussed with patient  -EC        Barriers to Rehab none identified   -EC        Patient's Goals for Discharge return home  -EC           Pain Assessment    Additional Documentation Pain Scale: Numbers Pre/Post-Treatment (Group)  -EC           Pain Scale: Numbers Pre/Post-Treatment    Pain Scale: Numbers, Pretreatment 0/10 - no pain  -EC        Pain Scale: Numbers, Post-Treatment 0/10 - no pain  -EC           Oral Motor and Function    Dentition Assessment upper dentures/partial in place;lower dentures/partial in place  -EC        Secretion Management WNL/WFL  -EC        Mucosal Quality moist, healthy  -EC           General Eating/Swallowing Observations    Respiratory Support Currently in Use room air  -EC        Eating/Swallowing Skills self-fed  -EC        Positioning During Eating upright 90 degree  -EC        Utensils Used straw  -EC        Consistencies Trialed regular textures;thin liquids  -EC           Clinical Swallow Eval    Oral Prep Phase WFL  -EC        Oral Transit WFL  -EC        Oral Residue WFL  -EC        Pharyngeal Phase WFL  -EC        Clinical Swallow Evaluation Summary pt states she has globus sensation with solid intake that is not present with liquid.  began today at lunch after stress test.  no s/s of aspiration or difficulty noted.  pt sitting upright on side of bed  -EC           Clinical Impression    SLP Swallowing Diagnosis functional oral phase  -EC        Functional Impact no impact on function  -EC        Criteria for Skilled Therapeutic Interventions Met not appropriate  -EC          User Key  (r) = Recorded By, (t) = Taken By, (c) = Cosigned By    Initials Name Effective Dates    EC Mine Brunner CCC-SLP 03/07/18 -         EDUCATION  The patient has been educated in the following areas:   Dysphagia (Swallowing Impairment).    SLP Recommendation and Plan  SLP Swallowing Diagnosis: functional oral phase              Criteria for Skilled Therapeutic Interventions Met: not appropriate  Anticipated Dischage Disposition: home                Plan of  Care Reviewed With: patient  Progress: improving  Outcome Summary: bedside swallow eval completed this date 2* pt c/o globus sensation todoay at noon meal.  pt has no oral or pharyngeal s/s of aspiration noted and  suspects that her anxiety may be causing this sensation.  slp reviewed reflux precautions and discussed with pt and nsg to monitor in the future.  at this time, pt is not appropriate for intervention and pt should continue to eat regular foods with upright posture, cyclic eating, and small bites/sips.               Time Calculation:         Time Calculation- SLP     Row Name 05/02/18 1501             Time Calculation- SLP    SLP Start Time 1404  -EC      SLP Stop Time 1430  -EC      SLP Time Calculation (min) 26 min  -EC      Total Timed Code Minutes- SLP 26 minute(s)  -EC      SLP Received On 05/02/18  -EC        User Key  (r) = Recorded By, (t) = Taken By, (c) = Cosigned By    Initials Name Provider Type    EC Mine Brunner CCC-SLP Speech and Language Pathologist          Therapy Charges for Today     Code Description Service Date Service Provider Modifiers Qty    58201842282 HC ST SWALLOWING CURRENT STATUS 5/2/2018 PIOTR Molina GN, CI 1    24241063070 HC ST SWALLOWING PROJECTED 5/2/2018 PIOTR Molina GN, CI 1    69530723374 HC ST SWALLOWING DISCHARGE 5/2/2018 PIOTR Molina GN, CI 1    82393572566 HC ST EVAL ORAL PHARYNG SWALLOW 2 5/2/2018 PIOTR Molina GN 1          SLP G-Codes  Functional Limitations: Swallowing  Swallow Current Status (): At least 1 percent but less than 20 percent impaired, limited or restricted  Swallow Goal Status (): At least 1 percent but less than 20 percent impaired, limited or restricted  Swallow Discharge Status (): At least 1 percent but less than 20 percent impaired, limited or restricted    SLP Discharge Summary  Anticipated Dischage Disposition: home  Reason for Discharge: all goals and  outcomes met, no further needs identified  Discharge Destination: other (see comments)    Mine Brunner, SANTI-SLP  5/2/2018

## 2018-05-02 NOTE — TREATMENT PLAN
CARDIOLOGY TREATMENT PLAN    Nuclear stress test revealed normal perfusion of her coronaries.   Look for non-cardiac reasons for chest pain.   Ms. Martinez may be D/C'd home from a cardiology perspective and keep her follow up appointment 5/31/18 with Dr. Kwon for her ASCAD. She has PCP appointment on 5/4/18 for close follow up.    Thank you.           This document has been electronically signed by BEBE Abbott on May 2, 2018 2:53 PM

## 2018-05-02 NOTE — PROGRESS NOTES
"  Cardiovascular Daily Inpatient Progress Note  Can Kwon M.D., Ph.D., [facility]      Subjective     Interval History:   No further chest pain episodes.    Review of Systems   Cardiovascular: Negative.    Respiratory: Negative.        Objective     Vital Sign Min/Max for last 24 hours  Temp  Min: 97.3 °F (36.3 °C)  Max: 97.8 °F (36.6 °C)   BP  Min: 106/56  Max: 122/70   Pulse  Min: 68  Max: 90   Resp  Min: 18  Max: 18   SpO2  Min: 95 %  Max: 99 %   No Data Recorded   Weight  Min: 118 kg (260 lb 3.2 oz)  Max: 118 kg (260 lb 3.2 oz)     Flowsheet Rows    Flowsheet Row First Filed Value   Admission Height 172.7 cm (68\") Documented at 04/30/2018 1857   Admission Weight 109 kg (240 lb) Documented at 04/30/2018 1857        1    04/30/18  1857 04/30/18  2135 05/02/18  0500   Weight: 109 kg (240 lb) 117 kg (257 lb 6.4 oz) 118 kg (260 lb 3.2 oz)     Body mass index is 39.56 kg/m².    Physical Exam:  Vitals:    05/02/18 0716   BP: 122/70   Pulse: 90   Resp: 18   Temp: 97.3 °F (36.3 °C)   SpO2: 99%     Body mass index is 39.56 kg/m².   Pulse Ox: Normal   General: alert, appears stated age and cooperative  Body Habitus: obese  HEENT: Head: Normocephalic, no lesions, without obvious abnormality. No arcus senilis, xanthelasma or xanthomas.  JVP: 6 cm of water at 45 degrees   Volume/Pulsation: Normal  Pulmonary:Normal     Precordium: Normal impulses. P2 is not palpable.   Shafer:  normal size and placement Palpable S4: absent.  Heart rate: normal    Heart Rhythm: regular     Heart Sounds: S1: normal  S2: normal intensity  S3: absent   S4: absent  Opening Snap: absent  A2-OS:  absent.   Pericardial rub: absent    Ejection click: None      Murmurs:  absent   Extremity: moves all extremities equally.   Pulses: 2+ and symmetric     DATA REVIEWED:       Results for orders placed during the hospital encounter of 04/30/18   Adult Transthoracic Echo Complete W/ Cont if Necessary Per Protocol    Narrative · The quality of the study is " limited due to poor acoustic windows related   to patient body habitus  · Left Ventricle: Estimated EF appears to be in the range of 51 - 55%.   Normal left ventricular cavity size noted  · There is moderate eccentric hypertrophy of the left ventricular cavity.   Left ventricular diastolic dysfunction is noted (grade II w/high LAP)   consistent with pseudonormalization.  · Right Ventricle: Normal right ventricular wall thickness, systolic   function and septal motion noted. Right ventricular cavity is borderline   dilated  · No evidence of a patent foramen ovale. No evidence of an atrial septal   defect present. Saline test results are negative.  · The aortic valve is abnormal with mild calcification of the right   coronary cusp.  · Mitral Valve: The mitral valve is abnormal in structure. There is   anterior leaflet thickening present. Mild mitral valve regurgitation is   present.  · Trace to mild tricuspid valve regurgitation is present. Estimated right   ventricular systolic pressure from tricuspid regurgitation is moderately   elevated (45-55 mmHg)  · Mild pulmonary hypertension is present.  · There is no evidence of pericardial effusion.          CXR/Imaging:     Imaging Results (most recent)     Procedure Component Value Units Date/Time    XR Chest 1 View [[id number]] Collected:  04/30/18 1932     Updated:  04/30/18 1949    Narrative:         EXAM:         Radiograph(s), Chest   VIEWS:   Portable ; 1       DATE/TIME:  4/30/2018 7:47 PM CDT                INDICATION:   Chest pain protocol  chest pain protocol    COMPARISON:  CXR: 11/27/17             FINDINGS:             - lines/tubes:    none     - cardiac:         size within normal limits         - mediastinum: contour within normal limits         - lungs:         no focal air space process, pulmonary  interstitial edema, nodule(s)/mass             - pleura:         no evidence of  fluid                  - osseous:         Status post median sternotomy                   - misc.:         Impression:       CONCLUSION:        1. No evidence of an active cardiopulmonary process.                                                Electronically signed by:  ASHLEY Mejia MD  4/30/2018 7:48  PM CDT Workstation: [id number]          LABS:   Lab Results   Component Value Date    GLUCOSE 182 (H) 05/01/2018    BUN 15 05/01/2018    CREATININE 0.98 05/01/2018    EGFRIFNONA 59 (L) 05/01/2018    BCR 15.3 05/01/2018    K 3.9 05/01/2018    CO2 29.0 05/01/2018    CALCIUM 9.1 05/01/2018    ALBUMIN 4.00 04/30/2018    LABIL2 1.2 04/30/2018    AST 45 (H) 04/30/2018    ALT 42 04/30/2018       Lab Results   Component Value Date    WBC 11.04 (H) 05/01/2018    HGB 12.6 05/01/2018    HCT 38.0 05/01/2018    MCV 84.6 05/01/2018     05/01/2018       Lab Results   Component Value Date    CHOL 197 04/16/2018    CHLPL 211 (H) 01/19/2017    TRIG 223 (H) 04/16/2018    HDL 33 (L) 04/16/2018     04/16/2018       Lab Results   Component Value Date    TSH 4.650 04/16/2018    Q8OXMNV 9.70 08/30/2017    THYROIDAB <6 01/02/2015       Lab Results   Component Value Date    CKTOTAL 38 02/24/2018    CKMB 1.00 02/24/2018    TROPONINI <0.012 05/01/2018       Lab Results   Component Value Date    HGBA1C 7.8 (H) 04/16/2018     Lab Results   Component Value Date    DDIMER 722 (H) 11/11/2016     Lab Results   Component Value Date    ALT 42 04/30/2018     Lab Results   Component Value Date    HGBA1C 7.8 (H) 04/16/2018    HGBA1C 8.2 (H) 01/18/2018    HGBA1C 7.4 (H) 10/19/2017     Lab Results   Component Value Date    MICROALBUR 12.0 07/20/2017    CREATININE 0.98 05/01/2018     Lab Results   Component Value Date    IRON 20 (L) 10/19/2017     Lab Results   Component Value Date    INR 0.99 04/30/2018    INR 0.95 10/14/2017    INR 1.00 08/27/2017    PROTIME 12.9 04/30/2018    PROTIME 12.6 10/14/2017    PROTIME 13.1 08/27/2017         Assessment/Plan     1. Chest pain syndrome with known ASCAD. Ariana Martinez has a  chest pain syndrome with pain complaints that are best characterized as atypical. An ischemia evaluation is indicated. The patient is not able to exercise. Reason for inability to exercise: respiratory condition(s): Deconditioning and HFpEF. She has known ASCAD.  She is status post PCI to her LAD in 2004.  She had subsequent restenosis and went for OPCABG with LIMA to the LAD.  She had chest pain recurrence of the summer of 2016.  Repeat invasive coronary angiography revealed a patent LIMA and a 60% OM1 lesion that had a FFR of 0.93. She has been maintained on OMT. She is currently on ASA, Plavix, Toprol and Lipitor.   I would like to proceed with a Lexiscan myocardial perfusion scintigraphy to insure patency of her LIMA, which unless worried about.  I also want to make sure that her OM1 lesion has not worsened.  Resting images were obtained yesterday.  -Stress MPS today  -Continue current medications    Thank you for allowing me to participate in the care of your patient.  If I can be of any further assistance, please do not hesitate contact me.            This document has been electronically signed by Can Kwon MD PhD on May 2, 2018 7:40 AM

## 2018-05-02 NOTE — PLAN OF CARE
Problem: Fall Risk (Adult)  Goal: Identify Related Risk Factors and Signs and Symptoms  Outcome: Outcome(s) achieved Date Met: 05/02/18    Goal: Absence of Fall  Outcome: Ongoing (interventions implemented as appropriate)      Problem: Patient Care Overview  Goal: Plan of Care Review  Outcome: Ongoing (interventions implemented as appropriate)   05/02/18 1443   Coping/Psychosocial   Plan of Care Reviewed With patient   Plan of Care Review   Progress improving     Goal: Individualization and Mutuality  Outcome: Ongoing (interventions implemented as appropriate)    Goal: Discharge Needs Assessment  Outcome: Ongoing (interventions implemented as appropriate)    Goal: Interprofessional Rounds/Family Conf  Outcome: Ongoing (interventions implemented as appropriate)      Problem: Cardiac: ACS (Acute Coronary Syndrome) (Adult)  Goal: Signs and Symptoms of Listed Potential Problems Will be Absent, Minimized or Managed (Cardiac: ACS)  Outcome: Ongoing (interventions implemented as appropriate)

## 2018-05-04 ENCOUNTER — OFFICE VISIT (OUTPATIENT)
Dept: FAMILY MEDICINE CLINIC | Facility: CLINIC | Age: 58
End: 2018-05-04

## 2018-05-04 VITALS
DIASTOLIC BLOOD PRESSURE: 72 MMHG | HEIGHT: 68 IN | HEART RATE: 82 BPM | SYSTOLIC BLOOD PRESSURE: 124 MMHG | OXYGEN SATURATION: 96 % | WEIGHT: 269 LBS | BODY MASS INDEX: 40.77 KG/M2 | TEMPERATURE: 97.1 F

## 2018-05-04 DIAGNOSIS — M25.50 CHRONIC PAIN OF MULTIPLE JOINTS: Primary | ICD-10-CM

## 2018-05-04 DIAGNOSIS — Z51.81 THERAPEUTIC DRUG MONITORING: ICD-10-CM

## 2018-05-04 DIAGNOSIS — IMO0001 CLASS 3 OBESITY DUE TO EXCESS CALORIES WITHOUT SERIOUS COMORBIDITY WITH BODY MASS INDEX (BMI) OF 40.0 TO 44.9 IN ADULT: ICD-10-CM

## 2018-05-04 DIAGNOSIS — R25.1 TREMOR: ICD-10-CM

## 2018-05-04 DIAGNOSIS — F41.1 GAD (GENERALIZED ANXIETY DISORDER): ICD-10-CM

## 2018-05-04 DIAGNOSIS — E53.8 B12 DEFICIENCY: ICD-10-CM

## 2018-05-04 DIAGNOSIS — G89.29 CHRONIC PAIN OF MULTIPLE JOINTS: Primary | ICD-10-CM

## 2018-05-04 DIAGNOSIS — E11.42 DIABETIC PERIPHERAL NEUROPATHY ASSOCIATED WITH TYPE 2 DIABETES MELLITUS (HCC): ICD-10-CM

## 2018-05-04 PROCEDURE — G0481 DRUG TEST DEF 8-14 CLASSES: HCPCS | Performed by: FAMILY MEDICINE

## 2018-05-04 PROCEDURE — 99214 OFFICE O/P EST MOD 30 MIN: CPT | Performed by: FAMILY MEDICINE

## 2018-05-04 PROCEDURE — 80307 DRUG TEST PRSMV CHEM ANLYZR: CPT | Performed by: FAMILY MEDICINE

## 2018-05-04 PROCEDURE — 96372 THER/PROPH/DIAG INJ SC/IM: CPT | Performed by: FAMILY MEDICINE

## 2018-05-04 RX ORDER — LORAZEPAM 1 MG/1
1 TABLET ORAL 2 TIMES DAILY PRN
Qty: 45 TABLET | Refills: 0 | Status: SHIPPED | OUTPATIENT
Start: 2018-05-04 | End: 2018-06-07 | Stop reason: SDUPTHER

## 2018-05-04 RX ORDER — HYDROCODONE BITARTRATE AND ACETAMINOPHEN 7.5; 325 MG/1; MG/1
1 TABLET ORAL EVERY 4 HOURS PRN
Qty: 180 TABLET | Refills: 0 | Status: SHIPPED | OUTPATIENT
Start: 2018-05-04 | End: 2018-06-07 | Stop reason: SDUPTHER

## 2018-05-04 RX ADMIN — CYANOCOBALAMIN 1000 MCG: 1000 INJECTION, SOLUTION INTRAMUSCULAR; SUBCUTANEOUS at 08:23

## 2018-05-04 NOTE — PROGRESS NOTES
Subjective   Chief Complaint   Patient presents with   • Pain     3 mo f/u   • Diabetes     3 mo f/u   • Med Refill     hydrocodone, last dose 5-4-18     Ariana Martinez is a 58 y.o. female.   Pain (3 mo f/u); Diabetes (3 mo f/u); and Med Refill (hydrocodone, last dose 5-4-18)    History of Present Illness     MAURICIO - controlled with ativan PRN  Due for a refill - needs to be tapered    Obesity - patient has been counseled by numerous providers on the significance of weight loss    Diabetic peripheral neuropathy - confirmed by EMG with Dr Mansfield  Pain managed with gabapentin  Needs a new referral to neurology    Diabetes - controlled   Average readings of FBG levels are 100-140  Currently prescribed novolog, tresiba  Has previously failed metformin, bydureon, jardiance  januvia was too expensive  Reviewed notes from Dr Kingston Tafoya's office on previous medications used    Chronic back and joint pain - controlled with norco    Hypertension - blood pressure currently managed with hctz, lasix, toprol    b12 deficiency - managed with monthly injections    The following portions of the patient's history were reviewed and updated as appropriate: allergies, current medications, past family history, past medical history, past social history, past surgical history and problem list.    Review of Systems   Constitutional: Negative for appetite change, chills, fatigue and fever.   HENT: Negative for congestion, ear pain, rhinorrhea and sore throat.    Eyes: Negative for pain.   Respiratory: Negative for cough and shortness of breath.    Cardiovascular: Negative for chest pain and palpitations.   Gastrointestinal: Negative for abdominal pain, constipation, diarrhea and nausea.   Genitourinary: Negative for dysuria.   Musculoskeletal: Negative for back pain, joint swelling and neck pain.   Skin: Negative for rash.   Neurological: Negative for dizziness and headaches.       Objective   /72   Pulse 82   Temp 97.1 °F  "(36.2 °C) (Temporal Artery )   Ht 172.7 cm (68\")   Wt 122 kg (269 lb)   SpO2 96%   BMI 40.90 kg/m²   Physical Exam   Constitutional: She is oriented to person, place, and time. She appears well-developed and well-nourished.   HENT:   Head: Normocephalic and atraumatic.   Eyes: Pupils are equal, round, and reactive to light.   Neck: Normal range of motion. Neck supple.   Cardiovascular: Normal rate, regular rhythm and normal heart sounds.    Pulmonary/Chest: Effort normal and breath sounds normal. No respiratory distress. She has no wheezes. She has no rales.   Abdominal: Soft. Bowel sounds are normal.   Central obesity   Musculoskeletal: Normal range of motion.   Neurological: She is alert and oriented to person, place, and time.   Skin: Skin is warm and dry.   Psychiatric: She has a normal mood and affect.   Nursing note and vitals reviewed.      Assessment/Plan   Problems Addressed this Visit        Digestive    B12 deficiency       Endocrine    Diabetic peripheral neuropathy associated with type 2 diabetes mellitus    Relevant Orders    Ambulatory Referral to Neurology (Completed)       Other    MAURICIO (generalized anxiety disorder)    Relevant Medications    LORazepam (ATIVAN) 1 MG tablet    Chronic pain of multiple joints - Primary    Relevant Medications    HYDROcodone-acetaminophen (NORCO) 7.5-325 MG per tablet    Class 3 obesity due to excess calories without serious comorbidity with body mass index (BMI) of 40.0 to 44.9 in adult      Other Visit Diagnoses     Therapeutic drug monitoring        Relevant Orders    ToxASSURE Select 13 (MW) - Urine, Clean Catch    Tremor        Relevant Orders    Ambulatory Referral to Neurology (Completed)        Referral to neurology    Patient's Body mass index is 40.9 kg/m². BMI is above normal parameters. Follow-up plan includes:  exercise counseling and nutrition counseling.    Follow up with specialists as scheduled    Taper benzodiazepines with concurrent use of " opiates  The patient has read and signed the [facility] Controlled Substance Contract.  I will continue to see patient for regular follow up appointments.  They are well controlled on their medication.  LESTER is updated every 3 months. The patient is aware of the potential for addiction and dependence.  uds today  lester reviewed [id number]    b12 IM today    Recheck in 3 months    Recommended an annual physical examination

## 2018-05-09 LAB — CONV REPORT SUMMARY: NORMAL

## 2018-05-09 RX ORDER — METOPROLOL SUCCINATE 25 MG/1
25 TABLET, EXTENDED RELEASE ORAL DAILY
Qty: 31 TABLET | Refills: 6 | Status: SHIPPED | OUTPATIENT
Start: 2018-05-09 | End: 2018-11-16 | Stop reason: SDUPTHER

## 2018-05-11 RX ORDER — INSULIN GLARGINE 100 [IU]/ML
INJECTION, SOLUTION SUBCUTANEOUS
Qty: 5 PEN | Refills: 5 | Status: SHIPPED | OUTPATIENT
Start: 2018-05-11 | End: 2019-01-11

## 2018-05-11 NOTE — TELEPHONE ENCOUNTER
Patient called 5/11/18 and said Elvis tried switching her to Toujeo because Tresiba is not working. She said Toujeo is not covered and is wanting it switched to something other than Toujeo or Tresiba.     Thanks.

## 2018-05-31 ENCOUNTER — OFFICE VISIT (OUTPATIENT)
Dept: CARDIOLOGY | Facility: CLINIC | Age: 58
End: 2018-05-31

## 2018-05-31 VITALS
SYSTOLIC BLOOD PRESSURE: 126 MMHG | HEART RATE: 90 BPM | WEIGHT: 263.9 LBS | HEIGHT: 68 IN | DIASTOLIC BLOOD PRESSURE: 64 MMHG | OXYGEN SATURATION: 97 % | BODY MASS INDEX: 39.99 KG/M2

## 2018-05-31 DIAGNOSIS — E66.9 RESTRICTIVE LUNG DISEASE SECONDARY TO OBESITY: ICD-10-CM

## 2018-05-31 DIAGNOSIS — I34.0 NON-RHEUMATIC MITRAL REGURGITATION: ICD-10-CM

## 2018-05-31 DIAGNOSIS — I50.30 (HFPEF) HEART FAILURE WITH PRESERVED EJECTION FRACTION (HCC): ICD-10-CM

## 2018-05-31 DIAGNOSIS — I27.20 PULMONARY HYPERTENSION (HCC): Primary | ICD-10-CM

## 2018-05-31 DIAGNOSIS — J98.4 RESTRICTIVE LUNG DISEASE SECONDARY TO OBESITY: ICD-10-CM

## 2018-05-31 DIAGNOSIS — E78.2 MIXED HYPERLIPIDEMIA: ICD-10-CM

## 2018-05-31 DIAGNOSIS — IMO0001 CLASS 3 OBESITY DUE TO EXCESS CALORIES WITHOUT SERIOUS COMORBIDITY WITH BODY MASS INDEX (BMI) OF 40.0 TO 44.9 IN ADULT: ICD-10-CM

## 2018-05-31 DIAGNOSIS — I73.9 CLAUDICATION (HCC): ICD-10-CM

## 2018-05-31 PROBLEM — I95.1 ORTHOSTATIC HYPOTENSION: Status: RESOLVED | Noted: 2017-04-12 | Resolved: 2018-05-31

## 2018-05-31 PROCEDURE — 99214 OFFICE O/P EST MOD 30 MIN: CPT | Performed by: INTERNAL MEDICINE

## 2018-05-31 NOTE — PATIENT INSTRUCTIONS
Ankle-Brachial Index Test  The ankle-brachial index (SAMPSON) test is used to find peripheral vascular disease (PVD). PVD is also known as peripheral arterial disease (PAD). PVD is the blocking or hardening of the arteries anywhere within the circulatory system beyond the heart. PVD is caused by cholesterol deposits in your blood vessels (atherosclerosis). These deposits cause arteries to narrow. The delivery of oxygen to your tissues is impaired as a result. This can cause muscle pain and fatigue. This is called claudication.  PVD means there may also be buildup of cholesterol in your:  · Heart. This increases the risk of heart attacks.  · Brain. This increases the risk of strokes.  The ankle-brachial index test measures the blood flow in your arms and legs. This test also determines if blood vessels in your leg are narrowed by cholesterol deposits. There are additional causes of a reduced ankle-brachial index, such as inflammation of vessels or a clot in the vessels. However, these are much less common than narrowing due to cholesterol deposits.  What is being tested?  The test is done while you are lying down and resting. Measurements are taken of the systolic pressure:  · In your arm (brachial).  · In your ankle at several points along your leg.  Systolic pressure is the pressure inside your arteries when your heart pumps. The measurements are taken several times on both sides. Then, the highest systolic pressure of the ankle is divided by the highest brachial systolic pressure. The result is the ankle-brachial pressure ratio, or SAMPSON.  Sometimes this test is repeated after you have exercised on a treadmill for five minutes. You may have leg pain during the exercise portion of the test if you suffer from PAD. If the index number drops after exercise, this may show that PAD is present.  A normal SAMPSON ratio is between 0.9 and 1.4. A value below 0.9 is considered abnormal.  This information is not intended to replace  advice given to you by your health care provider. Make sure you discuss any questions you have with your health care provider.  Document Released: 12/22/2005 Document Revised: 05/25/2017 Document Reviewed: 07/24/2015  [employer] Interactive Patient Education © 2017 [employer]  Intermittent Claudication  Intermittent claudication is pain in your leg that occurs when you walk or exercise and goes away when you rest. The pain can occur in one or both legs.  What are the causes?  Intermittent claudication is caused by the buildup of plaque within the major arteries in the body (atherosclerosis). The plaque, which makes arteries stiff and narrow, prevents enough blood from reaching your leg muscles. The pain occurs when you walk or exercise because your muscles need more blood when you are moving and exercising.  What increases the risk?  Risk factors include:  · A family history of atherosclerosis.  · A personal history of stroke or heart disease.  · Older age.  · Being inactive or overweight.  · Smoking cigarettes.  · Having another health condition such as:  ¨ Diabetes.  ¨ High blood pressure.  ¨ High cholesterol.  What are the signs or symptoms?  Your hip or leg may:  · Ache.  · Cramp.  · Feel tight.  · Feel weak.  · Feel heavy.  Over time, you may feel pain in your calf, thigh, or hip.  How is this diagnosed?  Your health care provider may diagnose intermittent claudication based on your symptoms and medical history. Your health care provider may also do tests to learn more about your condition. These may include:  · Blood tests.  · An ultrasound.  · Imaging tests such as angiography, magnetic resonance angiography (MRA), and computed tomography angiography (CTA).  How is this treated?  You may be treated for problems such as:  · High blood pressure.  · High cholesterol.  · Diabetes.  Other treatments may include:  · Lifestyle changes such as:  ¨ Starting an exercise program.  ¨ Losing weight.  ¨ Quitting  smoking.  · Medicines to help restore blood flow through your legs.  · Blood vessel surgery (angioplasty) to restore blood flow if your intermittent claudication is caused by severe peripheral artery disease.  Follow these instructions at home:  · Manage any other health conditions you have.  · Eat a diet low in saturated fats and calories to maintain a healthy weight.  · Quit smoking, if you smoke.  · Take medicines only as directed by your health care provider.  · If your health care provider recommended an exercise program for you, follow it as directed. Your exercise program may involve:  ¨ Walking three or more times a week.  ¨ Walking until you have certain symptoms of intermittent claudication.  ¨ Resting until symptoms go away.  ¨ Gradually increasing walking time to about 50 minutes a day.  Contact a health care provider if:  Your condition is not getting better or is getting worse.  Get help right away if:  · You have chest pain.  · You have difficulty breathing.  · You develop arm weakness.  · You have trouble speaking.  · Your face begins to droop.  This information is not intended to replace advice given to you by your health care provider. Make sure you discuss any questions you have with your health care provider.  Document Released: 10/20/2005 Document Revised: 05/25/2017 Document Reviewed: 03/26/2015  [employer] Interactive Patient Education © 2017 [employer]

## 2018-05-31 NOTE — PROGRESS NOTES
Cardiovascular Medicine   Can Kwon M.D., Ph.D., [facility]      The patient returns to cardiovascular clinic for follow-up of the following:    PROBLEM LIST:  1. MV ASCAD  A. CABG, 2005  -LIMA-LAD: Prior PCI 2.5 x 28mm in 2004  -RCA, 60%  B. [facility], 6/2016  -pLAD: 50%  -Ostial D1-30%  -mLAD: 100%  -dLAD fills via LIMA  -OM1: 60%  -FFR was 0.93  2. HTN  3. HLD  4. DM2  5. Mild MR/TR  6. LE pain  7. PAH    Pulmonary Hypertension  She presents for evaluation and treatment of pulmonary hypertension. Symptoms include dyspnea on exertion.  PFTs were restrictive. She does have HFpEF. She also has mild MR. Dyspnea is stable.      Congestive Heart Failure/Cardiomyopathy  Ariana Martinez is a 58 y.o. female who presents for evaluation of dyspnea. Current CHF diagnosis: HFpEF.  Patient's current complaints are dyspnea. She denies chest pain, chest pressure/discomfort, exertional chest pressure/discomfort, irregular heart beat, palpitations and tachypnea. She states she is compliant all of the time with her medications. She states she is noncompliant much of the time with her diet. The patient is taking Metoprolol Succinate, and furosemide (LASIX) 40 mg every day. The patient has not had emergency department visits or inpatient admissions for ADHF.  The patient's last discharge date from our facility: No discharge date for patient encounter..  The patient reports that there dyspnea is stable. Symptoms are consistent with NYHA III.  The patient reports the following changes in NYHA class since their last visit:  did not change. The patient does not have an ICD.     Atherosclerotic Disease  Patient is a 58 y.o. female who presents for follow-up of atherosclerotic coronary artery disease.  Recent history: taking medications as instructed, no medication side effects noted, no TIA's, no chest pain on exertion, notes stable dyspnea on exertion, no change, no swelling of ankles and no intermittent claudication symptoms.  Patient's symptoms have been completely resolved. Medication side effects include: none. The patient has a diagnosis of S/P CABG and PCI.    PTCI anatomy: PTCI to LAD. OPCABG anatomy:  LIMA to LAD; . CABG date: 2005. CTS: The patient's last [facility] was 2016. The patient's LVEF is: LV ejection fraction >= 40%. The patient is prescribed ASA and Clopidogrel (Plavix), Metoprolol Succinate and Lipitor (atorvastatin). The patient's current CCS is: No anginal symptoms.     Valve Disease  Ariana Martinez is a 58 y.o. female who presents for follow-up of mitral valve disease.  Current status: Mild MR..  The patient has not had valve surgery.  The patient does not have a history of rheumatic fever. The patient does not need endocarditis prophylaxis.      Peripheral Vascular Disease  Ariana Martinez is a 58 y.o. female who presents for evaluation of claudication.   Rest pain is not present. Ulceration is not present. Trental has not been tried. She did have ABIs last fall that were normal.     The following portions of the patient's history were reviewed and updated as appropriate: allergies, current medications, past family history, past medical history, past social history, past surgical history and problem list.      Review of Systems   Cardiovascular: Positive for claudication and dyspnea on exertion. Negative for chest pain, cyanosis, irregular heartbeat, leg swelling, near-syncope, orthopnea, palpitations, paroxysmal nocturnal dyspnea and syncope.   Respiratory: Positive for shortness of breath. Negative for cough, sputum production and wheezing.      family history includes Diabetes in her other, sister, sister, sister, sister, sister, and sister; Heart disease in her mother, other, sister, and sister; Hypertension in her mother, other, sister, and sister; Stroke in her mother.   reports that she has never smoked. She has never used smokeless tobacco. She reports that she does not drink alcohol or use drugs.  Allergies    Allergen Reactions   • Seroquel [Quetiapine Fumarate] Anaphylaxis   • Avandia [Rosiglitazone] Swelling   • Morphine And Related Hallucinations     If patient takes too much       Current Outpatient Prescriptions:   •  ARIPiprazole (ABILIFY) 15 MG tablet, TAKE 1 TABLET BY MOUTH DAILY., Disp: 30 tablet, Rfl: 5  •  aspirin 81 MG chewable tablet, Chew 81 mg daily., Disp: , Rfl:   •  atorvastatin (LIPITOR) 40 MG tablet, TAKE 1 TABLET BY MOUTH EVERY NIGHT., Disp: 90 tablet, Rfl: 1  •  B-D ULTRAFINE III SHORT PEN 31G X 8 MM misc, USE AS DIRECTED 4 TIMES DAILY, Disp: 150 each, Rfl: 11  •  Calcium Citrate-Vitamin D (CITRACAL/VITAMIN D) 250-200 MG-UNIT tablet, Take 2 tablets by mouth 2 (two) times a day., Disp: , Rfl:   •  clopidogrel (PLAVIX) 75 MG tablet, TAKE 1 TABLET BY MOUTH DAILY., Disp: 90 tablet, Rfl: 3  •  furosemide (LASIX) 40 MG tablet, Take 1 tablet by mouth Daily., Disp: 90 tablet, Rfl: 3  •  gabapentin (NEURONTIN) 800 MG tablet, Take 1 tablet by mouth 3 (Three) Times a Day., Disp: 90 tablet, Rfl: 5  •  GLUCAGON EMERGENCY 1 MG injection, USE AS DIRECTED AS NEEDED, Disp: 1 kit, Rfl: 0  •  HYDROcodone-acetaminophen (NORCO) 7.5-325 MG per tablet, Take 1 tablet by mouth Every 4 (Four) Hours As Needed for Moderate Pain ., Disp: 180 tablet, Rfl: 0  •  Insulin Glargine (BASAGLAR KWIKPEN) 100 UNIT/ML injection pen, 100 units q hs, Disp: 5 pen, Rfl: 5  •  LORazepam (ATIVAN) 1 MG tablet, Take 1 tablet by mouth 2 (Two) Times a Day As Needed for Anxiety., Disp: 45 tablet, Rfl: 0  •  meclizine (ANTIVERT) 25 MG tablet, Take 1 tablet by mouth 3 (Three) Times a Day As Needed for dizziness., Disp: 90 tablet, Rfl: 6  •  metoclopramide (REGLAN) 10 MG tablet, Take 1 tablet by mouth 4 (Four) Times a Day Before Meals & at Bedtime. (Patient taking differently: Take 10 mg by mouth 4 (Four) Times a Day As Needed.), Disp: 30 tablet, Rfl: 0  •  metoprolol succinate XL (TOPROL-XL) 25 MG 24 hr tablet, TAKE 1 TABLET BY MOUTH DAILY., Disp:  31 tablet, Rfl: 6  •  mirtazapine (REMERON) 45 MG tablet, Take 1 tablet by mouth Every Night., Disp: 90 tablet, Rfl: 1  •  NOVOLOG FLEXPEN 100 UNIT/ML solution pen-injector sc pen, INJECT 40 UNITS WITH MEALS (Patient taking differently: inject 60 units with meals), Disp: 5 pen, Rfl: 5  •  ondansetron (ZOFRAN) 8 MG tablet, Take 1 tablet by mouth Every 8 (Eight) Hours. (Patient taking differently: Take 8 mg by mouth Every 8 (Eight) Hours As Needed.), Disp: 90 tablet, Rfl: 3  •  ONE TOUCH ULTRA TEST test strip, USE TO TEST SUGAR 4 TIMES A DAY, Disp: 400 each, Rfl: 0  •  pantoprazole (PROTONIX) 40 MG EC tablet, TAKE 1 TABLET BY MOUTH DAILY., Disp: 90 tablet, Rfl: 2  •  venlafaxine XR (EFFEXOR-XR) 150 MG 24 hr capsule, TAKE ONE CAPSULE BY MOUTH TWICE A DAY FOR DEPRESSION, Disp: 180 capsule, Rfl: 3  •  vitamin D (ERGOCALCIFEROL) 80231 units capsule capsule, TAKE ONE CAPSULE BY MOUTH EVERY SUNDAY, Disp: 12 capsule, Rfl: 2    Current Facility-Administered Medications:   •  cyanocobalamin injection 1,000 mcg, 1,000 mcg, Intramuscular, Q28 Days, Francisca Fong MD, 1,000 mcg at 05/04/18 0823    Physical Exam:  Vitals:    05/31/18 1409   BP: 126/64   Pulse: 90   SpO2: 97%     Body mass index is 40.13 kg/m².   Last Weights:   Wt Readings from Last 3 Encounters:   05/31/18 120 kg (263 lb 14.4 oz)   05/04/18 122 kg (269 lb)   05/02/18 118 kg (260 lb 3.2 oz)     Pulse Ox: Normal   General: alert, appears stated age and cooperative  Body Habitus: obese  HEENT: Head: Normocephalic, no lesions, without obvious abnormality. No arcus senilis, xanthelasma or xanthomas.  JVP: 6 cm of water at 45 degrees   Volume/Pulsation: Normal  Heart rate: normal    Heart Rhythm: regular     Heart Sounds: S1: normal intensity  S2: normal intensity  S3: absent   S4: absent  Opening Snap: absent  A2-OS:  absent.   Pericardial rub: absent    Ejection click: None      Murmurs:  absent   Extremity: moves all extremities equally.   LE Skin: normal  exam; no erythema, swelling or tenderness. no cyanosis, clubbing or edema.   LE Pulses: Left 2+; Right 1-  Pulses: 2+ and symmetric    DATA REVIEWED:       TTE/LAMONTE:  Results for orders placed during the hospital encounter of 04/30/18   Adult Transthoracic Echo Complete W/ Cont if Necessary Per Protocol    Narrative · The quality of the study is limited due to poor acoustic windows related   to patient body habitus  · Left Ventricle: Estimated EF appears to be in the range of 51 - 55%.   Normal left ventricular cavity size noted  · There is moderate eccentric hypertrophy of the left ventricular cavity.   Left ventricular diastolic dysfunction is noted (grade II w/high LAP)   consistent with pseudonormalization.  · Right Ventricle: Normal right ventricular wall thickness, systolic   function and septal motion noted. Right ventricular cavity is borderline   dilated  · No evidence of a patent foramen ovale. No evidence of an atrial septal   defect present. Saline test results are negative.  · The aortic valve is abnormal with mild calcification of the right   coronary cusp.  · Mitral Valve: The mitral valve is abnormal in structure. There is   anterior leaflet thickening present. Mild mitral valve regurgitation is   present.  · Trace to mild tricuspid valve regurgitation is present. Estimated right   ventricular systolic pressure from tricuspid regurgitation is moderately   elevated (45-55 mmHg)  · Mild pulmonary hypertension is present.  · There is no evidence of pericardial effusion.          Lab Results   Component Value Date    GLUCOSE 193 (H) 05/02/2018    BUN 16 05/02/2018    CREATININE 0.96 05/02/2018    EGFRIFNONA 60 05/02/2018    BCR 16.7 05/02/2018    K 5.4 (H) 05/02/2018    CO2 28.0 05/02/2018    CALCIUM 9.1 05/02/2018    ALBUMIN 4.00 04/30/2018    LABIL2 1.2 04/30/2018    AST 45 (H) 04/30/2018    ALT 42 04/30/2018     Lab Results   Component Value Date    WBC 12.39 (H) 05/02/2018    HGB 13.4 05/02/2018     HCT 40.7 05/02/2018    MCV 86.0 05/02/2018     05/02/2018     Lab Results   Component Value Date    CHOL 197 04/16/2018    CHLPL 211 (H) 01/19/2017    TRIG 223 (H) 04/16/2018    HDL 33 (L) 04/16/2018     04/16/2018     Lab Results   Component Value Date    TSH 4.650 04/16/2018    Y7SNTPT 9.70 08/30/2017    THYROIDAB <6 01/02/2015     Lab Results   Component Value Date    CKTOTAL 38 02/24/2018    CKMB 1.00 02/24/2018    TROPONINI <0.012 05/01/2018     Lab Results   Component Value Date    HGBA1C 7.8 (H) 04/16/2018     Lab Results   Component Value Date    DDIMER 722 (H) 11/11/2016     Lab Results   Component Value Date    ALT 42 04/30/2018     Lab Results   Component Value Date    HGBA1C 7.8 (H) 04/16/2018    HGBA1C 8.2 (H) 01/18/2018    HGBA1C 7.4 (H) 10/19/2017     Lab Results   Component Value Date    MICROALBUR 12.0 07/20/2017    CREATININE 0.96 05/02/2018     Lab Results   Component Value Date    IRON 20 (L) 10/19/2017     Lab Results   Component Value Date    INR 0.99 04/30/2018    INR 0.95 10/14/2017    INR 1.00 08/27/2017    PROTIME 12.9 04/30/2018    PROTIME 12.6 10/14/2017    PROTIME 13.1 08/27/2017       Assessment/Plan     1. PAH/PVH. WHO Group 2/3; FC: III.  RV status: Adapted. PFTs restrictive secondary to obesity. Chest CTA showed no ILD. Insurance would not pay for PSG.   -No current indication for PAH-specific medications  -6MWT    2. HFpEF. NYHA stage C. FC-III. Patient is currently euvolemic and with  Excellent perfusion. The patient's hemodynamics are currently acceptable.   -BB:  Metoprolol Succinate  -DIURETIC: furosemide (LASIX) 40 mg every day  -Fluid restriction: Other N/A  -Sodium restriction: 1,500 mg Na    3. Mild Mitral Regurgitation. Asymptomatic.  ACC Stage/Objective Assessment: B;  NYHA FC-III.  There are no high-risk echo findings of progression. There are no surgical indications at this time. The patient has not had valve surgery..   -The patient has been advised to  "remain in clinical surveillance every 6 months.  -Signs and symptoms of worsening valve disease discussed.  I've asked the patient contact me for an earlier appointment if these develop.  -I've recommended a repeat 2D TTE every 3 years.   TTE due: 2021.    4. ASCAD, obstructive. No anginal symptoms. The patient has been revascularized.  Revascularization:  CABG with: LIMA to LAD; .   -Metoprolol Succinate  -ASA and Clopidogrel (Plavix)  -Lipitor (atorvastatin)    5. Concerns for PAD: ABIs were normal 9/2017.  -ASA  -Lipitor (atorvastatin)  -Stress ABIs    6. Cardiac Risk Assessment/Dyslipidemia/BP Goals:  Pt. has known coronary artery disease and meets indication for statin therapy. Today, the patient's blood pressure is controlled.  The patient has been diagnosed with essential hypertension.  -Dietary changes: Increase soluble fiber  Reduce saturated fat, \"trans\" monounsaturated fatty acids, and cholesterol  -Exercise changes:  Encouraged daily aerobic activity.  -Recommended high-intensity statin therapy: Lipitor (atorvastatin)  -Recommended ASA  -BP goal <130/80, Maintain BMI 18.5-24.9, Maintain hemoglobin A1c less than 7  -Continue current medications and follow-up PCP for mgmt of HTN    7. Nicotine status: has never smoked    8. Weight: Body mass index is 40.13 kg/m²..  BMI above upper level, FU Plan documented .  Class: Obese Class III extreme obesity: > or equal to 40kg/m2  -General patient education:  -Weight loss hand-out  -Exercise intervention:   Hand out, increase aerobic activity  -PCP Follow-up          Return in about 3 months (around 8/31/2018).        Ariana was seen today for coronary artery disease.    Diagnoses and all orders for this visit:    Pulmonary hypertension    Mixed hyperlipidemia    (HFpEF) heart failure with preserved ejection fraction    Restrictive lung disease secondary to obesity    Class 3 obesity due to excess calories without serious comorbidity with body mass index (BMI) of " 40.0 to 44.9 in adult    Non-rheumatic mitral regurgitation    Claudication          This document has been electronically signed by Can Kwon MD PhD on May 31, 2018 2:45 PM

## 2018-06-07 DIAGNOSIS — M25.50 CHRONIC PAIN OF MULTIPLE JOINTS: ICD-10-CM

## 2018-06-07 DIAGNOSIS — G89.29 CHRONIC PAIN OF MULTIPLE JOINTS: ICD-10-CM

## 2018-06-07 DIAGNOSIS — F41.1 GAD (GENERALIZED ANXIETY DISORDER): ICD-10-CM

## 2018-06-07 RX ORDER — LORAZEPAM 1 MG/1
1 TABLET ORAL DAILY PRN
Qty: 30 TABLET | Refills: 0 | Status: SHIPPED | OUTPATIENT
Start: 2018-06-07 | End: 2018-08-02 | Stop reason: SDUPTHER

## 2018-06-07 RX ORDER — HYDROCODONE BITARTRATE AND ACETAMINOPHEN 7.5; 325 MG/1; MG/1
1 TABLET ORAL EVERY 4 HOURS PRN
Qty: 180 TABLET | Refills: 0 | Status: SHIPPED | OUTPATIENT
Start: 2018-06-07 | End: 2018-07-06 | Stop reason: SDUPTHER

## 2018-06-12 ENCOUNTER — PROCEDURE VISIT (OUTPATIENT)
Dept: CARDIOLOGY | Facility: CLINIC | Age: 58
End: 2018-06-12

## 2018-06-12 DIAGNOSIS — I50.30 (HFPEF) HEART FAILURE WITH PRESERVED EJECTION FRACTION (HCC): ICD-10-CM

## 2018-06-12 DIAGNOSIS — I27.20 PULMONARY HYPERTENSION (HCC): ICD-10-CM

## 2018-06-12 PROCEDURE — 94618 PULMONARY STRESS TESTING: CPT | Performed by: INTERNAL MEDICINE

## 2018-06-12 NOTE — PROGRESS NOTES
Ariana Martinez  Procedure: 6 Minute Walk Test   6/12/18  Indication:pulm htn    Pretest: BP:126/76               HR:83               Sa02:98               Dyspnea:2               Fatigue:6    Post Test: BP:134/64               HR:102               Sa02:96               Dyspnea:7               Fatigue:96    First 6MWT:no / 5-2-17    Supplemental oxygen during test:no    Stopped before 6 minutes:no    Pauses:1    Results in distance walked:272.46 m    Did individual experience any pain or discomfort:yes / feet and back    Attestation:  I was immediately available for the above test and agree with the data.     NYHA Functional Class III.      Can Kwon MD PhD

## 2018-06-14 RX ORDER — BLOOD-GLUCOSE METER
1 KIT MISCELLANEOUS AS NEEDED
Qty: 1 EACH | Refills: 0 | Status: SHIPPED | OUTPATIENT
Start: 2018-06-14 | End: 2018-07-10 | Stop reason: SDUPTHER

## 2018-06-15 ENCOUNTER — CLINICAL SUPPORT (OUTPATIENT)
Dept: FAMILY MEDICINE CLINIC | Facility: CLINIC | Age: 58
End: 2018-06-15

## 2018-06-15 DIAGNOSIS — E53.8 B12 DEFICIENCY: ICD-10-CM

## 2018-06-15 PROCEDURE — 96372 THER/PROPH/DIAG INJ SC/IM: CPT | Performed by: FAMILY MEDICINE

## 2018-06-15 RX ADMIN — CYANOCOBALAMIN 1000 MCG: 1000 INJECTION, SOLUTION INTRAMUSCULAR; SUBCUTANEOUS at 08:31

## 2018-06-19 ENCOUNTER — TELEPHONE (OUTPATIENT)
Dept: CARDIOLOGY | Facility: CLINIC | Age: 58
End: 2018-06-19

## 2018-06-19 DIAGNOSIS — I73.9 CLAUDICATION (HCC): Primary | ICD-10-CM

## 2018-06-19 NOTE — TELEPHONE ENCOUNTER
Patient was contacted regarding priyanka results. Ms. mckee does continue to have claudication and referral was placed for her to see marybel lira per dr. galeano

## 2018-06-21 DIAGNOSIS — K21.9 GERD WITHOUT ESOPHAGITIS: ICD-10-CM

## 2018-06-21 RX ORDER — PANTOPRAZOLE SODIUM 40 MG/1
TABLET, DELAYED RELEASE ORAL
Qty: 90 TABLET | Refills: 2 | Status: SHIPPED | OUTPATIENT
Start: 2018-06-21 | End: 2019-01-11

## 2018-06-22 ENCOUNTER — TELEPHONE (OUTPATIENT)
Dept: ENDOCRINOLOGY | Facility: CLINIC | Age: 58
End: 2018-06-22

## 2018-06-23 LAB
BH CV LOWER ARTERIAL LEFT ABI RATIO: 1.07
BH CV LOWER ARTERIAL LEFT DORSALIS PEDIS SYS MAX: 145 MMHG
BH CV LOWER ARTERIAL LEFT POST EX ABI RATIO: 0.83
BH CV LOWER ARTERIAL LEFT POST TIBIAL SYS MAX: 143 MMHG
BH CV LOWER ARTERIAL RIGHT ABI RATIO: 1.01
BH CV LOWER ARTERIAL RIGHT DORSALIS PEDIS SYS MAX: 137 MMHG
BH CV LOWER ARTERIAL RIGHT POST EX ABI RATIO: 0.77
BH CV LOWER ARTERIAL RIGHT POST TIBIAL SYS MAX: 137 MMHG
UPPER ARTERIAL LEFT ARM BRACHIAL SYS MAX: 134 MMHG
UPPER ARTERIAL RIGHT ARM BRACHIAL SYS MAX: 135 MMHG

## 2018-07-05 DIAGNOSIS — M25.50 CHRONIC PAIN OF MULTIPLE JOINTS: ICD-10-CM

## 2018-07-05 DIAGNOSIS — G89.29 CHRONIC PAIN OF MULTIPLE JOINTS: ICD-10-CM

## 2018-07-05 NOTE — TELEPHONE ENCOUNTER
Dr. Francisca Wade, YANET  Phone Number: [phone]     Needs refill on  HYDROcodone-acetaminophen (NORCO) 7.5-325 MG, due Saturday to [facility] in [place].  Patient is up to date on controlled medication consent, Brandon report, and appt from 05/04/2018.

## 2018-07-06 RX ORDER — HYDROCODONE BITARTRATE AND ACETAMINOPHEN 7.5; 325 MG/1; MG/1
1 TABLET ORAL EVERY 4 HOURS PRN
Qty: 180 TABLET | Refills: 0 | Status: SHIPPED | OUTPATIENT
Start: 2018-07-06 | End: 2018-08-02 | Stop reason: SDUPTHER

## 2018-07-09 ENCOUNTER — CLINICAL SUPPORT (OUTPATIENT)
Dept: FAMILY MEDICINE CLINIC | Facility: CLINIC | Age: 58
End: 2018-07-09

## 2018-07-09 DIAGNOSIS — E53.8 B12 DEFICIENCY: Primary | ICD-10-CM

## 2018-07-09 PROCEDURE — 96372 THER/PROPH/DIAG INJ SC/IM: CPT | Performed by: FAMILY MEDICINE

## 2018-07-09 RX ADMIN — CYANOCOBALAMIN 1000 MCG: 1000 INJECTION, SOLUTION INTRAMUSCULAR; SUBCUTANEOUS at 11:28

## 2018-07-10 ENCOUNTER — OFFICE VISIT (OUTPATIENT)
Dept: CARDIAC SURGERY | Facility: CLINIC | Age: 58
End: 2018-07-10

## 2018-07-10 VITALS
OXYGEN SATURATION: 100 % | TEMPERATURE: 98 F | HEART RATE: 86 BPM | WEIGHT: 267 LBS | BODY MASS INDEX: 40.47 KG/M2 | DIASTOLIC BLOOD PRESSURE: 65 MMHG | HEIGHT: 68 IN | SYSTOLIC BLOOD PRESSURE: 105 MMHG

## 2018-07-10 DIAGNOSIS — M25.50 CHRONIC PAIN OF MULTIPLE JOINTS: ICD-10-CM

## 2018-07-10 DIAGNOSIS — Z86.79 H/O INTERMITTENT CLAUDICATION: ICD-10-CM

## 2018-07-10 DIAGNOSIS — G89.29 CHRONIC PAIN OF MULTIPLE JOINTS: ICD-10-CM

## 2018-07-10 DIAGNOSIS — R09.89 BRUIT: Primary | ICD-10-CM

## 2018-07-10 PROCEDURE — 99214 OFFICE O/P EST MOD 30 MIN: CPT | Performed by: THORACIC SURGERY (CARDIOTHORACIC VASCULAR SURGERY)

## 2018-07-11 RX ORDER — BLOOD-GLUCOSE METER
KIT MISCELLANEOUS
Qty: 1 EACH | Refills: 0 | Status: SHIPPED | OUTPATIENT
Start: 2018-07-11 | End: 2021-10-18 | Stop reason: SDUPTHER

## 2018-07-12 LAB
BH CV XLRA MEAS CAROTID RIGHT ICA/CCA DIASTOLIC RATIO: 1.3
BH CV XLRA MEAS LEFT DIST CCA EDV: 18 CM/SEC
BH CV XLRA MEAS LEFT DIST CCA PSV: 91 CM/SEC
BH CV XLRA MEAS LEFT DIST ICA EDV: 11 CM/SEC
BH CV XLRA MEAS LEFT DIST ICA PSV: 30 CM/SEC
BH CV XLRA MEAS LEFT ICA/CCA DIASTOLIC RATIO: 1.1
BH CV XLRA MEAS LEFT ICA/CCA RATIO: 0.5
BH CV XLRA MEAS LEFT MID ICA EDV: 27 CM/SEC
BH CV XLRA MEAS LEFT MID ICA PSV: 66 CM/SEC
BH CV XLRA MEAS LEFT PROX CCA EDV: 25 CM/SEC
BH CV XLRA MEAS LEFT PROX CCA PSV: 141 CM/SEC
BH CV XLRA MEAS LEFT PROX ECA EDV: 155 CM/SEC
BH CV XLRA MEAS LEFT PROX ECA PSV: 122 CM/SEC
BH CV XLRA MEAS LEFT PROX ICA EDV: 17 CM/SEC
BH CV XLRA MEAS LEFT PROX ICA PSV: 59 CM/SEC
BH CV XLRA MEAS LEFT VERTEBRAL A EDV: 12 CM/SEC
BH CV XLRA MEAS LEFT VERTEBRAL A PSV: 31 CM/SEC
BH CV XLRA MEAS RIGHT DIST CCA EDV: 18 CM/SEC
BH CV XLRA MEAS RIGHT DIST CCA PSV: 88 CM/SEC
BH CV XLRA MEAS RIGHT DIST ICA EDV: 29 CM/SEC
BH CV XLRA MEAS RIGHT DIST ICA PSV: 76 CM/SEC
BH CV XLRA MEAS RIGHT ICA/CCA RATIO: 0.5
BH CV XLRA MEAS RIGHT MID ICA EDV: 28 CM/SEC
BH CV XLRA MEAS RIGHT MID ICA PSV: 63 CM/SEC
BH CV XLRA MEAS RIGHT PROX CCA EDV: 22 CM/SEC
BH CV XLRA MEAS RIGHT PROX CCA PSV: 140 CM/SEC
BH CV XLRA MEAS RIGHT PROX ECA EDV: 18 CM/SEC
BH CV XLRA MEAS RIGHT PROX ECA PSV: 135 CM/SEC
BH CV XLRA MEAS RIGHT PROX ICA EDV: 18 CM/SEC
BH CV XLRA MEAS RIGHT PROX ICA PSV: 66 CM/SEC
BH CV XLRA MEAS RIGHT VERTEBRAL A EDV: 16 CM/SEC
BH CV XLRA MEAS RIGHT VERTEBRAL A PSV: 42 CM/SEC

## 2018-07-15 PROBLEM — Z86.79 H/O INTERMITTENT CLAUDICATION: Status: ACTIVE | Noted: 2018-06-12

## 2018-07-15 NOTE — PROGRESS NOTES
Ariana Martinez  1960            58 y.o.      7/10/2018    Chief Complaint   Patient presents with   • Claudication   PCP LISA SUTTON MD  CARDIOLOGY GUSTAVO GUERRA MD (referring)    This 58 year old woman referred by Dr. Guerra after his having obtained SAMPSON B/A exercise (6-12-18) which found normal SAMPSON at rest with triphasic wave forms recorded throughout. After exercise SAMPSON positive for exercise induced claudication.    HPI    The patient has extensive past history of ASCAD treated with cath/angioplast and ultimately CABG,Congestive heart failure, cardiomyopathy, postoperative status post PTCI to LAD, CABG with LIMA to LAD CABG 2005, mild mitral regurgitation History of rheumatic fever and no specific treatment for her mitral regurgitation    ROS  She reports intermittent claudication with pain in hips (right hip) and calves, negative for chest pain, negative for cyanosis or irregular heartbeat.  No history of syncope orthopnea palpitations paroxysmal nocturnal dyspnea.  Other problems include hypertension, diabetes mellitus type 2,  mild MR/TR, pulmonary artery hypertension    She has undergone implantation of bladder stimulator and the implanted device is to right of midline posteriorly.  She relates part of her back and hip pain to the implanted device.  She is to see Dr. Damico in the very near future to consider removal of the implanted bladder stimulator    The patient states that she has claudication at less than 1 block  She feels that some of her back pain may emanate from her bladder stimulator    The Bladder stimulator of course precludes MRI evaluation of her back to be certain that she does not in fact have spinal stenosis.  I don;t think there is any question that she does have short distance claudication due to arterial occlusive disease in lower extremities but it may be quite difficult to discern whether most of her pain is arterial in origin or musculo-skeletal (spinal  stenosis)    She takes NORCO for back pain and has for years      Current Outpatient Prescriptions:   •  ARIPiprazole (ABILIFY) 15 MG tablet, TAKE 1 TABLET BY MOUTH DAILY., Disp: 30 tablet, Rfl: 5  •  aspirin 81 MG chewable tablet, Chew 81 mg daily., Disp: , Rfl:   •  atorvastatin (LIPITOR) 40 MG tablet, TAKE 1 TABLET BY MOUTH EVERY NIGHT., Disp: 90 tablet, Rfl: 1  •  brompheniramine-pseudoephedrine-DM 30-2-10 MG/5ML syrup, Take 5 mL by mouth 4 (Four) Times a Day As Needed for Congestion, Cough or Allergies., Disp: 120 mL, Rfl: 0  •  Calcium Citrate-Vitamin D (CITRACAL/VITAMIN D) 250-200 MG-UNIT tablet, Take 2 tablets by mouth 2 (two) times a day., Disp: , Rfl:   •  clopidogrel (PLAVIX) 75 MG tablet, TAKE 1 TABLET BY MOUTH DAILY., Disp: 90 tablet, Rfl: 3  •  furosemide (LASIX) 40 MG tablet, Take 1 tablet by mouth Daily., Disp: 90 tablet, Rfl: 3  •  gabapentin (NEURONTIN) 800 MG tablet, Take 1 tablet by mouth 3 (Three) Times a Day., Disp: 90 tablet, Rfl: 5  •  GLUCAGON EMERGENCY 1 MG injection, USE AS DIRECTED AS NEEDED, Disp: 1 kit, Rfl: 0  •  glucose blood (ONE TOUCH ULTRA TEST) test strip, 1 each by Other route 4 (Four) Times a Day. Use as instructed, Disp: 400 each, Rfl: 1  •  HYDROcodone-acetaminophen (NORCO) 7.5-325 MG per tablet, Take 1 tablet by mouth Every 4 (Four) Hours As Needed for Moderate Pain ., Disp: 180 tablet, Rfl: 0  •  insulin aspart (NOVOLOG FLEXPEN) 100 UNIT/ML solution pen-injector sc pen, 60 units qac tid, Disp: 18 pen, Rfl: 11  •  Insulin Glargine (BASAGLAR KWIKPEN) 100 UNIT/ML injection pen, 100 units q hs, Disp: 5 pen, Rfl: 5  •  Insulin Pen Needle (B-D ULTRAFINE III SHORT PEN) 31G X 8 MM misc, Inject 1 each into the shoulder, thigh, or buttocks 4 (Four) Times a Day. use as directed, Disp: 150 each, Rfl: 11  •  LORazepam (ATIVAN) 1 MG tablet, Take 1 tablet by mouth Daily As Needed for Anxiety., Disp: 30 tablet, Rfl: 0  •  meclizine (ANTIVERT) 25 MG tablet, Take 1 tablet by mouth 3 (Three)  Times a Day As Needed for dizziness., Disp: 90 tablet, Rfl: 6  •  metoclopramide (REGLAN) 10 MG tablet, Take 1 tablet by mouth 4 (Four) Times a Day Before Meals & at Bedtime. (Patient taking differently: Take 10 mg by mouth 4 (Four) Times a Day As Needed.), Disp: 30 tablet, Rfl: 0  •  metoprolol succinate XL (TOPROL-XL) 25 MG 24 hr tablet, TAKE 1 TABLET BY MOUTH DAILY., Disp: 31 tablet, Rfl: 6  •  mirtazapine (REMERON) 45 MG tablet, Take 1 tablet by mouth Every Night., Disp: 90 tablet, Rfl: 1  •  ondansetron (ZOFRAN) 8 MG tablet, Take 1 tablet by mouth Every 8 (Eight) Hours. (Patient taking differently: Take 8 mg by mouth Every 8 (Eight) Hours As Needed.), Disp: 90 tablet, Rfl: 3  •  pantoprazole (PROTONIX) 40 MG EC tablet, TAKE 1 TABLET BY MOUTH DAILY., Disp: 90 tablet, Rfl: 2  •  venlafaxine XR (EFFEXOR-XR) 150 MG 24 hr capsule, TAKE ONE CAPSULE BY MOUTH TWICE A DAY FOR DEPRESSION, Disp: 180 capsule, Rfl: 3  •  vitamin D (ERGOCALCIFEROL) 60140 units capsule capsule, TAKE ONE CAPSULE BY MOUTH EVERY SUNDAY, Disp: 12 capsule, Rfl: 2  •  Blood Glucose Monitoring Suppl (ONE TOUCH ULTRA MINI) w/Device kit, USE AS DIRECTED TO CHECK BLOOD SUGAR, Disp: 1 each, Rfl: 0    Current Facility-Administered Medications:   •  cyanocobalamin injection 1,000 mcg, 1,000 mcg, Intramuscular, Q28 Days, Francisca Fong MD, 1,000 mcg at 07/09/18 1128    The following portions of the patient's history were reviewed and updated as appropriate: allergies, current medications, past family history, past medical history, past social history, past surgical history and problem list.        Past Surgical History:   Procedure Laterality Date   • ABDOMINAL SURGERY     • ANGIOPLASTY      coronary   • BREAST BIOPSY Right    • CARDIAC CATHETERIZATION     • CARDIAC CATHETERIZATION N/A 6/20/2017    Procedure: Right Heart Cath;  Surgeon: Can Kwon MD PhD;  Location: [facility] INVASIVE LOC[facility];  Service:    • CARPAL TUNNEL RELEASE     •  "CHOLECYSTECTOMY     • CORONARY ARTERY BYPASS GRAFT  2005   • ENDOSCOPY AND COLONOSCOPY     • FOOT SURGERY      Toes   • GASTRIC BANDING      Revision, laparoscopic   • HYSTERECTOMY     • MOUTH SURGERY     • SALPINGO OOPHORECTOMY     • SHOULDER SURGERY     • TRANSESOPHAGEAL ECHOCARDIOGRAM (LAMONTE)      With color flow           Physical Exam  /65 (BP Location: Right arm)   Pulse 86   Temp 98 °F (36.7 °C) (Oral)   Ht 172.7 cm (68\")   Wt 121 kg (267 lb)   SpO2 100%   BMI 40.60 kg/m²     HEENT:No masses soft sounds over carotid arteries    CHEST: To auscultation    HEART: Regular rate and rhythm    ABDOMEN: Rotund no masses palpable nontender    EXTREMITIES: Slight edema both lower extremities.  Femoral and dorsalis pedis pulses are palpable bilaterally he had normal ABIs at rest but some drop in the lower extremity pressures with exercise    VASCULAR LAB STUDIES:CAROTID DUPLEX SCAN *7-10-18)                          RIGHT 0-49%    LEFT 0-49%  Vert flow        Antegrade          Antegrade      SAMPSON (6-12-18)             RIGHT 1.01    LEFT 1.07  WAVE FORMS   PT  Triphasic        Triphasic                              DP  Triphasic        Triphasic  Normal arterial perfusion both lower extremities at rest with triphasic wave forms recorded throughout.  After exercise pressures dropped but returned to normal indicating exercised induced diminished lower extremity flow after exercise.      RADIOLOGY:      Bruit [R09.89]    IMPRESSION:  1. Back pain, leg pain with walking short distances  2.History of acute renal failure on CKD present eGFR 60  3. Normal carotid arteries with antegrade flow both vertebral arteries  4. History of implanted bladder stimulator Patient attributes some of pain to presence of stimulatorm (To see Dr. Damico re advisability of removal.  5. Possible Neurogenic claudication aguilar though on after/exercise SAMPSON there is objective evidence of some degree of atherosclerotic iliac or femoral arterial " occlusive disease.  6. The femoral and DP artery pulses are palpable and SAMPSON ratios are normal at rest  7. Patient states that she is tosee Dr. Damico re removal of bladder stimulator  8. Presently MRI back to evaluate for spinal stenosis precluded by stimulator.                  Assessment/Plan  Ariana was seen today for claudication.    Diagnoses and all orders for this visit:    Bruit  -     Duplex Carotid Ultrasound CAR    Chronic pain of multiple joints    H/O intermittent claudication                  Return 8-21-18.            Jack L. Hamman, MD  7/15/2018

## 2018-07-21 ENCOUNTER — APPOINTMENT (OUTPATIENT)
Dept: GENERAL RADIOLOGY | Facility: HOSPITAL | Age: 58
End: 2018-07-21

## 2018-07-21 ENCOUNTER — APPOINTMENT (OUTPATIENT)
Dept: ULTRASOUND IMAGING | Facility: HOSPITAL | Age: 58
End: 2018-07-21

## 2018-07-21 ENCOUNTER — APPOINTMENT (OUTPATIENT)
Dept: CT IMAGING | Facility: HOSPITAL | Age: 58
End: 2018-07-21

## 2018-07-21 ENCOUNTER — HOSPITAL ENCOUNTER (OUTPATIENT)
Facility: HOSPITAL | Age: 58
Setting detail: OBSERVATION
Discharge: HOME | End: 2018-07-23
Attending: EMERGENCY MEDICINE | Admitting: EMERGENCY MEDICINE

## 2018-07-21 DIAGNOSIS — Z74.09 IMPAIRED MOBILITY AND ADLS: ICD-10-CM

## 2018-07-21 DIAGNOSIS — E53.8 LOW VITAMIN B12 LEVEL: ICD-10-CM

## 2018-07-21 DIAGNOSIS — R07.9 CHEST PAIN, UNSPECIFIED TYPE: Primary | ICD-10-CM

## 2018-07-21 DIAGNOSIS — Z78.9 IMPAIRED MOBILITY AND ADLS: ICD-10-CM

## 2018-07-21 LAB
ALBUMIN SERPL-MCNC: 4 G/DL (ref 3.4–4.8)
ALBUMIN/GLOB SERPL: 1.3 G/DL (ref 1.1–1.8)
ALP SERPL-CCNC: 108 U/L (ref 38–126)
ALT SERPL W P-5'-P-CCNC: 32 U/L (ref 9–52)
ANION GAP SERPL CALCULATED.3IONS-SCNC: 7 MMOL/L (ref 5–15)
AST SERPL-CCNC: 24 U/L (ref 14–36)
BASOPHILS # BLD AUTO: 0.03 10*3/MM3 (ref 0–0.2)
BASOPHILS NFR BLD AUTO: 0.2 % (ref 0–2)
BILIRUB SERPL-MCNC: 0.3 MG/DL (ref 0.2–1.3)
BUN BLD-MCNC: 13 MG/DL (ref 7–21)
BUN/CREAT SERPL: 16.5 (ref 7–25)
CALCIUM SPEC-SCNC: 9.1 MG/DL (ref 8.4–10.2)
CHLORIDE SERPL-SCNC: 102 MMOL/L (ref 95–110)
CO2 SERPL-SCNC: 31 MMOL/L (ref 22–31)
CREAT BLD-MCNC: 0.79 MG/DL (ref 0.5–1)
D-DIMER, QUANTITATIVE (MAD,POW, STR): 552 NG/ML (FEU) (ref 0–470)
DEPRECATED RDW RBC AUTO: 43.6 FL (ref 36.4–46.3)
EOSINOPHIL # BLD AUTO: 0.33 10*3/MM3 (ref 0–0.7)
EOSINOPHIL NFR BLD AUTO: 2.7 % (ref 0–7)
ERYTHROCYTE [DISTWIDTH] IN BLOOD BY AUTOMATED COUNT: 14.3 % (ref 11.5–14.5)
GFR SERPL CREATININE-BSD FRML MDRD: 75 ML/MIN/1.73 (ref 51–120)
GLOBULIN UR ELPH-MCNC: 3.2 GM/DL (ref 2.3–3.5)
GLUCOSE BLD-MCNC: 128 MG/DL (ref 60–100)
GLUCOSE BLDC GLUCOMTR-MCNC: 124 MG/DL (ref 70–130)
HCT VFR BLD AUTO: 38.7 % (ref 35–45)
HGB BLD-MCNC: 12.9 G/DL (ref 12–15.5)
HOLD SPECIMEN: NORMAL
IMM GRANULOCYTES # BLD: 0.03 10*3/MM3 (ref 0–0.02)
IMM GRANULOCYTES NFR BLD: 0.2 % (ref 0–0.5)
INR PPP: 1.02 (ref 0.8–1.2)
LYMPHOCYTES # BLD AUTO: 2.16 10*3/MM3 (ref 0.6–4.2)
LYMPHOCYTES NFR BLD AUTO: 17.8 % (ref 10–50)
MCH RBC QN AUTO: 27.9 PG (ref 26.5–34)
MCHC RBC AUTO-ENTMCNC: 33.3 G/DL (ref 31.4–36)
MCV RBC AUTO: 83.8 FL (ref 80–98)
MONOCYTES # BLD AUTO: 0.78 10*3/MM3 (ref 0–0.9)
MONOCYTES NFR BLD AUTO: 6.4 % (ref 0–12)
NEUTROPHILS # BLD AUTO: 8.83 10*3/MM3 (ref 2–8.6)
NEUTROPHILS NFR BLD AUTO: 72.7 % (ref 37–80)
NT-PROBNP SERPL-MCNC: 100 PG/ML (ref 0–900)
PLATELET # BLD AUTO: 418 10*3/MM3 (ref 150–450)
PMV BLD AUTO: 9.7 FL (ref 8–12)
POTASSIUM BLD-SCNC: 3.5 MMOL/L (ref 3.5–5.1)
PROT SERPL-MCNC: 7.2 G/DL (ref 6.3–8.6)
PROTHROMBIN TIME: 13.2 SECONDS (ref 11.1–15.3)
RBC # BLD AUTO: 4.62 10*6/MM3 (ref 3.77–5.16)
SODIUM BLD-SCNC: 140 MMOL/L (ref 137–145)
TROPONIN I SERPL-MCNC: <0.012 NG/ML
TROPONIN I SERPL-MCNC: <0.012 NG/ML
WBC NRBC COR # BLD: 12.16 10*3/MM3 (ref 3.2–9.8)
WHOLE BLOOD HOLD SPECIMEN: NORMAL

## 2018-07-21 PROCEDURE — G0378 HOSPITAL OBSERVATION PER HR: HCPCS

## 2018-07-21 PROCEDURE — 84484 ASSAY OF TROPONIN QUANT: CPT | Performed by: PHYSICIAN ASSISTANT

## 2018-07-21 PROCEDURE — 82607 VITAMIN B-12: CPT | Performed by: FAMILY MEDICINE

## 2018-07-21 PROCEDURE — 93010 ELECTROCARDIOGRAM REPORT: CPT | Performed by: INTERNAL MEDICINE

## 2018-07-21 PROCEDURE — 84443 ASSAY THYROID STIM HORMONE: CPT | Performed by: FAMILY MEDICINE

## 2018-07-21 PROCEDURE — 0 IOPAMIDOL PER 1 ML: Performed by: FAMILY MEDICINE

## 2018-07-21 PROCEDURE — 93005 ELECTROCARDIOGRAM TRACING: CPT | Performed by: EMERGENCY MEDICINE

## 2018-07-21 PROCEDURE — 96372 THER/PROPH/DIAG INJ SC/IM: CPT

## 2018-07-21 PROCEDURE — 71046 X-RAY EXAM CHEST 2 VIEWS: CPT

## 2018-07-21 PROCEDURE — 82962 GLUCOSE BLOOD TEST: CPT

## 2018-07-21 PROCEDURE — 82746 ASSAY OF FOLIC ACID SERUM: CPT | Performed by: FAMILY MEDICINE

## 2018-07-21 PROCEDURE — 99285 EMERGENCY DEPT VISIT HI MDM: CPT

## 2018-07-21 PROCEDURE — 85025 COMPLETE CBC W/AUTO DIFF WBC: CPT | Performed by: PHYSICIAN ASSISTANT

## 2018-07-21 PROCEDURE — 25010000002 ENOXAPARIN PER 10 MG: Performed by: FAMILY MEDICINE

## 2018-07-21 PROCEDURE — 85379 FIBRIN DEGRADATION QUANT: CPT | Performed by: FAMILY MEDICINE

## 2018-07-21 PROCEDURE — 63710000001 INSULIN DETEMIR PER 5 UNITS: Performed by: FAMILY MEDICINE

## 2018-07-21 PROCEDURE — 96374 THER/PROPH/DIAG INJ IV PUSH: CPT

## 2018-07-21 PROCEDURE — 83880 ASSAY OF NATRIURETIC PEPTIDE: CPT | Performed by: PHYSICIAN ASSISTANT

## 2018-07-21 PROCEDURE — 25010000002 MORPHINE PER 10 MG: Performed by: EMERGENCY MEDICINE

## 2018-07-21 PROCEDURE — 96375 TX/PRO/DX INJ NEW DRUG ADDON: CPT

## 2018-07-21 PROCEDURE — 25010000002 ONDANSETRON PER 1 MG: Performed by: EMERGENCY MEDICINE

## 2018-07-21 PROCEDURE — 96376 TX/PRO/DX INJ SAME DRUG ADON: CPT

## 2018-07-21 PROCEDURE — 85610 PROTHROMBIN TIME: CPT | Performed by: PHYSICIAN ASSISTANT

## 2018-07-21 PROCEDURE — 93970 EXTREMITY STUDY: CPT

## 2018-07-21 PROCEDURE — 84484 ASSAY OF TROPONIN QUANT: CPT | Performed by: FAMILY MEDICINE

## 2018-07-21 PROCEDURE — 71275 CT ANGIOGRAPHY CHEST: CPT

## 2018-07-21 PROCEDURE — 80053 COMPREHEN METABOLIC PANEL: CPT | Performed by: PHYSICIAN ASSISTANT

## 2018-07-21 RX ORDER — HYDROCODONE BITARTRATE AND ACETAMINOPHEN 7.5; 325 MG/1; MG/1
1 TABLET ORAL EVERY 4 HOURS PRN
Status: DISCONTINUED | OUTPATIENT
Start: 2018-07-21 | End: 2018-07-23 | Stop reason: HOSPADM

## 2018-07-21 RX ORDER — METOPROLOL SUCCINATE 25 MG/1
25 TABLET, EXTENDED RELEASE ORAL DAILY
Status: DISCONTINUED | OUTPATIENT
Start: 2018-07-22 | End: 2018-07-23 | Stop reason: HOSPADM

## 2018-07-21 RX ORDER — ONDANSETRON 4 MG/1
8 TABLET, FILM COATED ORAL EVERY 8 HOURS PRN
Status: DISCONTINUED | OUTPATIENT
Start: 2018-07-21 | End: 2018-07-22

## 2018-07-21 RX ORDER — SODIUM CHLORIDE 0.9 % (FLUSH) 0.9 %
10 SYRINGE (ML) INJECTION AS NEEDED
Status: DISCONTINUED | OUTPATIENT
Start: 2018-07-21 | End: 2018-07-23 | Stop reason: HOSPADM

## 2018-07-21 RX ORDER — ASPIRIN 81 MG/1
81 TABLET, CHEWABLE ORAL DAILY
Status: DISCONTINUED | OUTPATIENT
Start: 2018-07-22 | End: 2018-07-23 | Stop reason: HOSPADM

## 2018-07-21 RX ORDER — MIRTAZAPINE 15 MG/1
45 TABLET, FILM COATED ORAL NIGHTLY
Status: DISCONTINUED | OUTPATIENT
Start: 2018-07-21 | End: 2018-07-23 | Stop reason: HOSPADM

## 2018-07-21 RX ORDER — CLOPIDOGREL BISULFATE 75 MG/1
75 TABLET ORAL DAILY
Status: DISCONTINUED | OUTPATIENT
Start: 2018-07-22 | End: 2018-07-23 | Stop reason: HOSPADM

## 2018-07-21 RX ORDER — LORAZEPAM 1 MG/1
1 TABLET ORAL DAILY PRN
Status: DISCONTINUED | OUTPATIENT
Start: 2018-07-21 | End: 2018-07-23 | Stop reason: HOSPADM

## 2018-07-21 RX ORDER — METOCLOPRAMIDE 10 MG/1
10 TABLET ORAL 4 TIMES DAILY PRN
Status: DISCONTINUED | OUTPATIENT
Start: 2018-07-21 | End: 2018-07-21 | Stop reason: ALTCHOICE

## 2018-07-21 RX ORDER — PANTOPRAZOLE SODIUM 40 MG/1
40 TABLET, DELAYED RELEASE ORAL EVERY MORNING
Status: DISCONTINUED | OUTPATIENT
Start: 2018-07-22 | End: 2018-07-22

## 2018-07-21 RX ORDER — NALOXONE HCL 0.4 MG/ML
0.4 VIAL (ML) INJECTION
Status: DISCONTINUED | OUTPATIENT
Start: 2018-07-21 | End: 2018-07-23 | Stop reason: HOSPADM

## 2018-07-21 RX ORDER — GABAPENTIN 400 MG/1
800 CAPSULE ORAL EVERY 8 HOURS SCHEDULED
Status: DISCONTINUED | OUTPATIENT
Start: 2018-07-21 | End: 2018-07-22

## 2018-07-21 RX ORDER — VENLAFAXINE HYDROCHLORIDE 75 MG/1
150 CAPSULE, EXTENDED RELEASE ORAL
Status: DISCONTINUED | OUTPATIENT
Start: 2018-07-22 | End: 2018-07-23 | Stop reason: HOSPADM

## 2018-07-21 RX ORDER — ASPIRIN 325 MG
325 TABLET ORAL ONCE
Status: COMPLETED | OUTPATIENT
Start: 2018-07-21 | End: 2018-07-21

## 2018-07-21 RX ORDER — CYANOCOBALAMIN 1000 UG/ML
1000 INJECTION, SOLUTION INTRAMUSCULAR; SUBCUTANEOUS
Status: DISCONTINUED | OUTPATIENT
Start: 2018-08-02 | End: 2018-07-23 | Stop reason: HOSPADM

## 2018-07-21 RX ORDER — PROMETHAZINE HYDROCHLORIDE 25 MG/ML
12.5 INJECTION, SOLUTION INTRAMUSCULAR; INTRAVENOUS EVERY 6 HOURS PRN
Status: DISCONTINUED | OUTPATIENT
Start: 2018-07-21 | End: 2018-07-23 | Stop reason: HOSPADM

## 2018-07-21 RX ORDER — SODIUM CHLORIDE 9 MG/ML
100 INJECTION, SOLUTION INTRAVENOUS CONTINUOUS
Status: DISCONTINUED | OUTPATIENT
Start: 2018-07-21 | End: 2018-07-23 | Stop reason: HOSPADM

## 2018-07-21 RX ORDER — FUROSEMIDE 40 MG/1
40 TABLET ORAL DAILY
Status: DISCONTINUED | OUTPATIENT
Start: 2018-07-22 | End: 2018-07-23 | Stop reason: HOSPADM

## 2018-07-21 RX ORDER — SODIUM CHLORIDE 0.9 % (FLUSH) 0.9 %
1-10 SYRINGE (ML) INJECTION AS NEEDED
Status: DISCONTINUED | OUTPATIENT
Start: 2018-07-21 | End: 2018-07-23 | Stop reason: HOSPADM

## 2018-07-21 RX ORDER — MECLIZINE HYDROCHLORIDE 25 MG/1
25 TABLET ORAL 3 TIMES DAILY PRN
Status: DISCONTINUED | OUTPATIENT
Start: 2018-07-21 | End: 2018-07-23 | Stop reason: HOSPADM

## 2018-07-21 RX ORDER — HYDROMORPHONE HCL 110MG/55ML
0.5 PATIENT CONTROLLED ANALGESIA SYRINGE INTRAVENOUS
Status: DISCONTINUED | OUTPATIENT
Start: 2018-07-21 | End: 2018-07-23 | Stop reason: HOSPADM

## 2018-07-21 RX ORDER — ONDANSETRON 2 MG/ML
4 INJECTION INTRAMUSCULAR; INTRAVENOUS ONCE
Status: COMPLETED | OUTPATIENT
Start: 2018-07-21 | End: 2018-07-21

## 2018-07-21 RX ORDER — ATORVASTATIN CALCIUM 40 MG/1
40 TABLET, FILM COATED ORAL NIGHTLY
Status: DISCONTINUED | OUTPATIENT
Start: 2018-07-21 | End: 2018-07-23 | Stop reason: HOSPADM

## 2018-07-21 RX ORDER — ARIPIPRAZOLE 15 MG/1
15 TABLET ORAL DAILY
Status: DISCONTINUED | OUTPATIENT
Start: 2018-07-22 | End: 2018-07-23 | Stop reason: HOSPADM

## 2018-07-21 RX ADMIN — GABAPENTIN 800 MG: 400 CAPSULE ORAL at 21:02

## 2018-07-21 RX ADMIN — MIRTAZAPINE 45 MG: 15 TABLET, FILM COATED ORAL at 20:50

## 2018-07-21 RX ADMIN — ONDANSETRON 8 MG: 4 TABLET, FILM COATED ORAL at 20:49

## 2018-07-21 RX ADMIN — ATORVASTATIN CALCIUM 40 MG: 40 TABLET, FILM COATED ORAL at 20:49

## 2018-07-21 RX ADMIN — ASPIRIN 325 MG: 325 TABLET, COATED ORAL at 14:52

## 2018-07-21 RX ADMIN — NITROGLYCERIN 0.5 INCH: 20 OINTMENT TOPICAL at 20:53

## 2018-07-21 RX ADMIN — INSULIN DETEMIR 100 UNITS: 100 INJECTION, SOLUTION SUBCUTANEOUS at 21:24

## 2018-07-21 RX ADMIN — IOPAMIDOL 72 ML: 755 INJECTION, SOLUTION INTRAVENOUS at 20:12

## 2018-07-21 RX ADMIN — ENOXAPARIN SODIUM 40 MG: 40 INJECTION SUBCUTANEOUS at 20:51

## 2018-07-21 RX ADMIN — SODIUM CHLORIDE 100 ML/HR: 900 INJECTION INTRAVENOUS at 21:01

## 2018-07-21 RX ADMIN — MORPHINE SULFATE 4 MG: 4 INJECTION INTRAVENOUS at 16:37

## 2018-07-21 RX ADMIN — ONDANSETRON HYDROCHLORIDE 4 MG: 2 INJECTION, SOLUTION INTRAMUSCULAR; INTRAVENOUS at 16:34

## 2018-07-21 RX ADMIN — MORPHINE SULFATE 4 MG: 4 INJECTION INTRAVENOUS at 18:05

## 2018-07-21 NOTE — H&P
[address] [address]  T - [id number]     H&P         SUBJECTIVE:   Patient Care Team:  Francisca Fong MD as PCP - General  Zulay Dan MA as Medical Assistant    Chief Complaint:     Chief Complaint   Patient presents with   • Chest Pain   • Shortness of Breath       Patient is 58 y.o. female presents with PMH CAD s/p CABG, HTN, T2DM, obesity, GERD, HFpEF, depression, is presented with complain of having retrosternal chest pain, started yesterday, pressure like in nature, radiating to her back, 7/10 this morning and now 6/10, worse with exertion. She is also complaining of having nausea as well. Pt states that she had a stress test about 2 months ago which was negative.     HPI     ROS/HISTORY/ CURRENT MEDICATIONS/OBJECTIVE/VS/PE:   Review of Systems:   Review of Systems   Constitutional: Positive for activity change, appetite change and fatigue. Negative for chills and fever.   HENT: Negative for congestion, postnasal drip and rhinorrhea.    Respiratory: Positive for shortness of breath. Negative for chest tightness and wheezing.    Cardiovascular: Positive for chest pain and leg swelling. Negative for palpitations.   Gastrointestinal: Negative for abdominal pain, constipation, diarrhea, nausea and vomiting.   Genitourinary: Negative for dysuria, frequency and urgency.   Musculoskeletal: Negative for back pain and joint swelling.   Neurological: Negative for dizziness and light-headedness.   Hematological: Negative for adenopathy.   Psychiatric/Behavioral: Negative for agitation, behavioral problems and confusion.       History:     Past Medical History:   Diagnosis Date   • Acquired hypothyroidism    • Angina, class IV (CMS/HCC)    • Anxiety    • Benign paroxysmal positional vertigo    • Carpal tunnel syndrome    • Chronic pain    • Coronary atherosclerosis    • Depression    • Diabetes mellitus (CMS/HCC)     Type 2, controlled   • Diabetic  polyneuropathy (CMS/HCC)    • Female stress incontinence    • Gastroparesis    • Hashimoto's thyroiditis    • Hyperlipidemia    • Hypertension    • Low back pain    • Malaise and fatigue    • Multiple joint pain    • Obesity     Refuses to be weighed   • Otalgia     Both   • Perforation of tympanic membrane     Left   • Postoperative wound infection    • Vitamin D deficiency      Past Surgical History:   Procedure Laterality Date   • ABDOMINAL SURGERY     • ANGIOPLASTY      coronary   • BREAST BIOPSY Right    • CARDIAC CATHETERIZATION     • CARDIAC CATHETERIZATION N/A 6/20/2017    Procedure: Right Heart Cath;  Surgeon: Can Kwon MD PhD;  Location: [facility] INVASIVE LOCATION;  Service:    • CARPAL TUNNEL RELEASE     • CHOLECYSTECTOMY     • CORONARY ARTERY BYPASS GRAFT  2005   • ENDOSCOPY AND COLONOSCOPY     • FOOT SURGERY      Toes   • GASTRIC BANDING      Revision, laparoscopic   • HYSTERECTOMY     • MOUTH SURGERY     • SALPINGO OOPHORECTOMY     • SHOULDER SURGERY     • TRANSESOPHAGEAL ECHOCARDIOGRAM (LAMONTE)      With color flow     Family History   Problem Relation Age of Onset   • Diabetes Other    • Heart disease Other    • Hypertension Other    • Heart disease Mother    • Stroke Mother    • Hypertension Mother    • Diabetes Sister    • Heart disease Sister    • Hypertension Sister    • Heart disease Sister    • Diabetes Sister    • Hypertension Sister    • Diabetes Sister    • Diabetes Sister    • Diabetes Sister    • Diabetes Sister      Social History   Substance Use Topics   • Smoking status: Never Smoker   • Smokeless tobacco: Never Used   • Alcohol use No       (Not in a hospital admission)  Allergies:  Seroquel [quetiapine fumarate]; Avandia [rosiglitazone]; and Morphine and related    Current Medications:     Current Facility-Administered Medications   Medication Dose Route Frequency Provider Last Rate Last Dose   • cyanocobalamin injection 1,000 mcg  1,000 mcg Intramuscular Q28 Days  Francisca Fong MD   1,000 mcg at 07/09/18 1128   • enoxaparin (LOVENOX) syringe 40 mg  40 mg Subcutaneous Q24H Irineo Baldwin MD       • promethazine (PHENERGAN) injection 12.5 mg  12.5 mg Intravenous Q6H PRN Irineo Baldwin MD       • sodium chloride 0.9 % flush 10 mL  10 mL Intravenous PRN Ben Porter PA-C         Current Outpatient Prescriptions   Medication Sig Dispense Refill   • ARIPiprazole (ABILIFY) 15 MG tablet TAKE 1 TABLET BY MOUTH DAILY. 30 tablet 5   • aspirin 81 MG chewable tablet Chew 81 mg daily.     • atorvastatin (LIPITOR) 40 MG tablet TAKE 1 TABLET BY MOUTH EVERY NIGHT. 90 tablet 1   • Blood Glucose Monitoring Suppl (ONE TOUCH ULTRA MINI) w/Device kit USE AS DIRECTED TO CHECK BLOOD SUGAR 1 each 0   • brompheniramine-pseudoephedrine-DM 30-2-10 MG/5ML syrup Take 5 mL by mouth 4 (Four) Times a Day As Needed for Congestion, Cough or Allergies. 120 mL 0   • Calcium Citrate-Vitamin D (CITRACAL/VITAMIN D) 250-200 MG-UNIT tablet Take 2 tablets by mouth 2 (two) times a day.     • clopidogrel (PLAVIX) 75 MG tablet TAKE 1 TABLET BY MOUTH DAILY. 90 tablet 3   • furosemide (LASIX) 40 MG tablet Take 1 tablet by mouth Daily. 90 tablet 3   • gabapentin (NEURONTIN) 800 MG tablet Take 1 tablet by mouth 3 (Three) Times a Day. 90 tablet 5   • GLUCAGON EMERGENCY 1 MG injection USE AS DIRECTED AS NEEDED 1 kit 0   • glucose blood (ONE TOUCH ULTRA TEST) test strip 1 each by Other route 4 (Four) Times a Day. Use as instructed 400 each 1   • HYDROcodone-acetaminophen (NORCO) 7.5-325 MG per tablet Take 1 tablet by mouth Every 4 (Four) Hours As Needed for Moderate Pain . 180 tablet 0   • insulin aspart (NOVOLOG FLEXPEN) 100 UNIT/ML solution pen-injector sc pen 60 units qac tid 18 pen 11   • Insulin Glargine (BASAGLAR KWIKPEN) 100 UNIT/ML injection pen 100 units q hs 5 pen 5   • Insulin Pen Needle (B-D ULTRAFINE III SHORT PEN) 31G X 8 MM misc Inject 1 each into the shoulder, thigh, or buttocks 4 (Four) Times a  Day. use as directed 150 each 11   • LORazepam (ATIVAN) 1 MG tablet Take 1 tablet by mouth Daily As Needed for Anxiety. 30 tablet 0   • meclizine (ANTIVERT) 25 MG tablet Take 1 tablet by mouth 3 (Three) Times a Day As Needed for dizziness. 90 tablet 6   • metoclopramide (REGLAN) 10 MG tablet Take 1 tablet by mouth 4 (Four) Times a Day Before Meals & at Bedtime. (Patient taking differently: Take 10 mg by mouth 4 (Four) Times a Day As Needed.) 30 tablet 0   • metoprolol succinate XL (TOPROL-XL) 25 MG 24 hr tablet TAKE 1 TABLET BY MOUTH DAILY. 31 tablet 6   • mirtazapine (REMERON) 45 MG tablet Take 1 tablet by mouth Every Night. 90 tablet 1   • ondansetron (ZOFRAN) 8 MG tablet Take 1 tablet by mouth Every 8 (Eight) Hours. (Patient taking differently: Take 8 mg by mouth Every 8 (Eight) Hours As Needed.) 90 tablet 3   • pantoprazole (PROTONIX) 40 MG EC tablet TAKE 1 TABLET BY MOUTH DAILY. 90 tablet 2   • venlafaxine XR (EFFEXOR-XR) 150 MG 24 hr capsule TAKE ONE CAPSULE BY MOUTH TWICE A DAY FOR DEPRESSION 180 capsule 3   • vitamin D (ERGOCALCIFEROL) 58729 units capsule capsule TAKE ONE CAPSULE BY MOUTH EVERY SUNDAY 12 capsule 2       Physical Exam:     Vital Sign Min/Max for last 24 hours  Temp  Min: 97.2 °F (36.2 °C)  Max: 97.2 °F (36.2 °C)   BP  Min: 116/55  Max: 148/70   Pulse  Min: 88  Max: 100   Resp  Min: 18  Max: 20   SpO2  Min: 93 %  Max: 98 %   Flow (L/min)  Min: 2  Max: 2   Weight  Min: 109 kg (240 lb)  Max: 109 kg (240 lb)       Physical Exam:    Physical Exam   Constitutional: She is oriented to person, place, and time. She appears well-developed and well-nourished.   HENT:   Head: Normocephalic and atraumatic.   Eyes: Pupils are equal, round, and reactive to light. EOM are normal.   Neck: Normal range of motion. Neck supple.   Cardiovascular: Normal rate, regular rhythm and normal heart sounds.    Pulmonary/Chest: Effort normal and breath sounds normal.   Abdominal: Soft. Bowel sounds are normal.    Musculoskeletal: Normal range of motion. She exhibits edema (right leg appear to be larger than right.).   B/L calf tenderness is present.    Neurological: She is alert and oriented to person, place, and time.   Skin: Skin is warm. Capillary refill takes less than 2 seconds.   Psychiatric: She has a normal mood and affect. Her behavior is normal.   Vitals reviewed.       Results Review:   Lab Results (last 24 hours)     Procedure Component Value Units Date/Time    D-dimer, Quantitative [[id number]]  (Abnormal) Collected:  07/21/18 1644    Specimen:  Blood Updated:  07/21/18 1852     D-Dimer, Quantitative 552 (H) ng/mL (FEU)     Narrative:       Dimer values <500 ng/ml FEU are FDA approved as aid in diagnosis of deep venous thrombosis and pulmonary embolism.  This test should not be used in an exclusion strategy with pretest probability alone.    A recent guideline regarding diagnosis for pulmonary thromboembolism recommends an adjusted exclusion criterion of age x 10 ng/ml FEU for patients >50 years of age ([employer] 2015; 163: 701-711).    [place] Draw [[id number]] Collected:  07/21/18 1644    Specimen:  Blood Updated:  07/21/18 1745    Narrative:       The following orders were created for panel order [place] Draw.  Procedure                               Abnormality         Status                     ---------                               -----------         ------                     Light Blue Top[[id number]]                                   Final result               Green Top (Gel)[[id number]]                                  Final result               Lavender Top[[id number]]                                     Final result               Gold Top - SST[[id number]]                                   Final result                 Please view results for these tests on the individual orders.    Light Blue Top [[id number]] Collected:  07/21/18 1644    Specimen:  Blood Updated:  07/21/18 1745     Extra Tube hold  for add-on     Comment: Auto resulted       Green Top (Gel) [[id number]] Collected:  07/21/18 1644    Specimen:  Blood Updated:  07/21/18 1745     Extra Tube Hold for add-ons.     Comment: Auto resulted.       Extra Tubes [[id number]] Collected:  07/21/18 1644    Specimen:  Blood from Blood, Venous Line Updated:  07/21/18 1745    Narrative:       The following orders were created for panel order Extra Tubes.  Procedure                               Abnormality         Status                     ---------                               -----------         ------                     Light Blue Top[[id number]]                                   Final result               Lavender Top[[id number]]                                     Final result               Gold Top - SST[[id number]]                                   Final result               Green Top (Gel)[[id number]]                                  Final result                 Please view results for these tests on the individual orders.    Light Blue Top [[id number]] Collected:  07/21/18 1644    Specimen:  Blood Updated:  07/21/18 1745     Extra Tube hold for add-on     Comment: Auto resulted       Lavender Top [[id number]] Collected:  07/21/18 1644    Specimen:  Blood Updated:  07/21/18 1745     Extra Tube hold for add-on     Comment: Auto resulted       Gold Top - SST [[id number]] Collected:  07/21/18 1644    Specimen:  Blood Updated:  07/21/18 1745     Extra Tube Hold for add-ons.     Comment: Auto resulted.       Green Top (Gel) [[id number]] Collected:  07/21/18 1644    Specimen:  Blood Updated:  07/21/18 1745     Extra Tube Hold for add-ons.     Comment: Auto resulted.       Lavender Top [[id number]] Collected:  07/21/18 1644    Specimen:  Blood Updated:  07/21/18 1745     Extra Tube hold for add-on     Comment: Auto resulted       Gold Top - SST [[id number]] Collected:  07/21/18 1644    Specimen:  Blood Updated:  07/21/18 1745     Extra Tube Hold for add-ons.      Comment: Auto resulted.       Troponin [[id number]]  (Normal) Collected:  07/21/18 1644    Specimen:  Blood Updated:  07/21/18 1732     Troponin I <0.012 ng/mL     BNP [[id number]]  (Normal) Collected:  07/21/18 1644    Specimen:  Blood Updated:  07/21/18 1732     proBNP 100.0 pg/mL     Comprehensive Metabolic Panel [[id number]]  (Abnormal) Collected:  07/21/18 1644    Specimen:  Blood Updated:  07/21/18 1722     Glucose 128 (H) mg/dL      BUN 13 mg/dL      Creatinine 0.79 mg/dL      Sodium 140 mmol/L      Potassium 3.5 mmol/L      Chloride 102 mmol/L      CO2 31.0 mmol/L      Calcium 9.1 mg/dL      Total Protein 7.2 g/dL      Albumin 4.00 g/dL      ALT (SGPT) 32 U/L      AST (SGOT) 24 U/L      Alkaline Phosphatase 108 U/L      Total Bilirubin 0.3 mg/dL      eGFR Non African Amer 75 mL/min/1.73      Globulin 3.2 gm/dL      A/G Ratio 1.3 g/dL      BUN/Creatinine Ratio 16.5     Anion Gap 7.0 mmol/L     Protime-INR [[id number]]  (Normal) Collected:  07/21/18 1644    Specimen:  Blood Updated:  07/21/18 1705     Protime 13.2 Seconds      INR 1.02    Narrative:       Therapeutic range for most indications is 2.0-3.0 INR,  or 2.5-3.5 for mechanical heart valves.    CBC & Differential [[id number]] Collected:  07/21/18 1644    Specimen:  Blood Updated:  07/21/18 1654    Narrative:       The following orders were created for panel order CBC & Differential.  Procedure                               Abnormality         Status                     ---------                               -----------         ------                     CBC Auto Differential[[id number]]        Abnormal            Final result                 Please view results for these tests on the individual orders.    CBC Auto Differential [[id number]]  (Abnormal) Collected:  07/21/18 1644    Specimen:  Blood Updated:  07/21/18 1654     WBC 12.16 (H) 10*3/mm3      RBC 4.62 10*6/mm3      Hemoglobin 12.9 g/dL      Hematocrit 38.7 %      MCV 83.8 fL      MCH 27.9 pg       MCHC 33.3 g/dL      RDW 14.3 %      RDW-SD 43.6 fl      MPV 9.7 fL      Platelets 418 10*3/mm3      Neutrophil % 72.7 %      Lymphocyte % 17.8 %      Monocyte % 6.4 %      Eosinophil % 2.7 %      Basophil % 0.2 %      Immature Grans % 0.2 %      Neutrophils, Absolute 8.83 (H) 10*3/mm3      Lymphocytes, Absolute 2.16 10*3/mm3      Monocytes, Absolute 0.78 10*3/mm3      Eosinophils, Absolute 0.33 10*3/mm3      Basophils, Absolute 0.03 10*3/mm3      Immature Grans, Absolute 0.03 (H) 10*3/mm3               Imaging Results (last 24 hours)     Procedure Component Value Units Date/Time    XR Chest 2 View [[id number]] Collected:  07/21/18 1422     Updated:  07/21/18 1443    Narrative:         [facility] [place]    Patient Name: CHIQUITA BAILEY    ATTENDING: PIERRE FRANKLIN     REFERRING: HOMER LEE    ORDERING: HOMER LEE    -----------------------    PROCEDURE: Chest, PA    Date of exam: 7/21/2018    HISTORY: Chest pain    A single PA view of the chest was obtained. Study is compared to  prior exam of 4/30/2018.    The lungs are satisfactorily expanded and clear of infiltrates or  effusions. Chronic elevation right hemidiaphragm is unchanged  from prior exam. Sternal wires are seen from previous median  sternotomy. The heart size is normal and the vasculature does not  appear congested.The costophrenic angles are clear.       Impression:       No active disease. No change from prior exam.      Electronically signed by:  Obie Thomas MD  7/21/2018 2:42 PM  CDT Workstation: [id number]           I reviewed the patient's new clinical results.  I reviewed the patient's new imaging results and agree with the interpretation.     ASSESSMENT/PLAN:   Assessment/Plan   Active Hospital Problems    Diagnosis Date Noted   • Chest pain [R07.9] 04/30/2018   • Class 3 obesity due to excess calories without serious comorbidity with body mass index (BMI) of 40.0 to 44.9 in adult (CMS/[facility]) [E66.09, Z68.41]  02/05/2018   • B12 deficiency [E53.8] 11/08/2017   • (HFpEF) heart failure with preserved ejection fraction (CMS/HCC) [I50.30] 08/27/2017   • Diabetic peripheral neuropathy associated with type 2 diabetes mellitus (CMS/HCC) [E11.42] 08/27/2017     Continue Gabapentin 800MG TID.     • Mixed hyperlipidemia [E78.2] 12/19/2016     Continue Statin      • Vitamin D deficiency [E55.9] 12/19/2016   • Depression, major, recurrent, moderate (CMS/HCC) [F33.1] 08/24/2016     Continue Effexor and Abilify     • GERD without esophagitis [K21.9] 08/24/2016     Continue protonix     • 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, with long-term current use of insulin (CMS/[facility]) [E11.49, E11.65, Z79.4] 08/05/2016     Continue home meds  Watch for hypoglcemia   - glucose levels have been normal       1. Chest pain: trend cardiac enzymes, Ddimer, US lower ext, tele, nitro for pain, she had echo about 2 months ago.   2. GERD: protonix  3. T2DM: continue with home dose of 60 units of novolog, and 100 units of levemir. She states that's what she has been taking.   4. Anxiety: ativan   5. HTN: continue with home medications.   6. CAD: statin, plavix, BB  7. Lower ext swelling: right appear to be larger with calf tenderness, will obtain US.   8. Depression: venlafaxine     SCD     I discussed the patient's findings and my recommendations with patient, family and nursing staff.              This document has been electronically signed by Irineo Baldwin MD on July 21, 2018 7:10 PM

## 2018-07-21 NOTE — ED PROVIDER NOTES
Subjective   Patient presents to emergency department for chest pain worsening since last night with associated left arm pain, diaphoresis.  States it is substernal/epigastric and feels like pressure.  Hx of previous CABG.  Denies syncope, nausea, vomiting.          History provided by:  Patient   used: No    Chest Pain   Pain location:  Substernal area and epigastric  Pain quality: pressure    Pain radiates to:  L arm and upper back  Pain severity:  Moderate  Onset quality:  Gradual  Duration:  1 day  Timing:  Constant  Progression:  Worsening  Chronicity:  New  Context: at rest    Relieved by:  Nothing  Associated symptoms: anxiety, back pain, diaphoresis, near-syncope and shortness of breath    Associated symptoms: no abdominal pain, no altered mental status, no claudication, no cough, no dizziness, no dysphagia, no fatigue, no fever, no headache, no heartburn, no lower extremity edema, no nausea, no numbness, no orthopnea, no palpitations, no PND, no syncope, no vomiting and no weakness    Risk factors: coronary artery disease, diabetes mellitus, high cholesterol, hypertension and obesity    Risk factors: no prior DVT/PE and no smoking    Shortness of Breath   Associated symptoms: chest pain and diaphoresis    Associated symptoms: no abdominal pain, no claudication, no cough, no fever, no headaches, no PND, no syncope and no vomiting        Review of Systems   Constitutional: Positive for diaphoresis. Negative for fatigue and fever.   HENT: Negative for trouble swallowing.    Respiratory: Positive for shortness of breath. Negative for cough.    Cardiovascular: Positive for chest pain and near-syncope. Negative for palpitations, orthopnea, claudication, syncope and PND.   Gastrointestinal: Negative for abdominal pain, heartburn, nausea and vomiting.   Genitourinary: Negative for dysuria and flank pain.   Musculoskeletal: Positive for back pain.   Skin: Negative for color change.    Allergic/Immunologic: Negative for immunocompromised state.   Neurological: Negative for dizziness, weakness, numbness and headaches.   Hematological: Does not bruise/bleed easily.   Psychiatric/Behavioral: Negative for confusion.       Past Medical History:   Diagnosis Date   • Acquired hypothyroidism    • Angina, class IV (CMS/HCC)    • Anxiety    • Benign paroxysmal positional vertigo    • Carpal tunnel syndrome    • Chronic pain    • Coronary atherosclerosis    • Depression    • Diabetes mellitus (CMS/[facility])     Type 2, controlled   • Diabetic polyneuropathy (CMS/HCC)    • Female stress incontinence    • Gastroparesis    • Hashimoto's thyroiditis    • Hyperlipidemia    • Hypertension    • Low back pain    • Malaise and fatigue    • Multiple joint pain    • Obesity     Refuses to be weighed   • Otalgia     Both   • Perforation of tympanic membrane     Left   • Postoperative wound infection    • Vitamin D deficiency        Allergies   Allergen Reactions   • Seroquel [Quetiapine Fumarate] Anaphylaxis   • Avandia [Rosiglitazone] Swelling   • Morphine And Related Hallucinations     If patient takes too much       Past Surgical History:   Procedure Laterality Date   • ABDOMINAL SURGERY     • ANGIOPLASTY      coronary   • BREAST BIOPSY Right    • CARDIAC CATHETERIZATION     • CARDIAC CATHETERIZATION N/A 6/20/2017    Procedure: Right Heart Cath;  Surgeon: Can Kwon MD PhD;  Location: [facility] INVASIVE LOCATION;  Service:    • CARPAL TUNNEL RELEASE     • CHOLECYSTECTOMY     • CORONARY ARTERY BYPASS GRAFT  2005   • ENDOSCOPY AND COLONOSCOPY     • FOOT SURGERY      Toes   • GASTRIC BANDING      Revision, laparoscopic   • HYSTERECTOMY     • MOUTH SURGERY     • SALPINGO OOPHORECTOMY     • SHOULDER SURGERY     • TRANSESOPHAGEAL ECHOCARDIOGRAM (LAMONTE)      With color flow       Family History   Problem Relation Age of Onset   • Diabetes Other    • Heart disease Other    • Hypertension Other    • Heart disease  "Mother    • Stroke Mother    • Hypertension Mother    • Diabetes Sister    • Heart disease Sister    • Hypertension Sister    • Heart disease Sister    • Diabetes Sister    • Hypertension Sister    • Diabetes Sister    • Diabetes Sister    • Diabetes Sister    • Diabetes Sister        Social History     Social History   • Marital status:      Social History Main Topics   • Smoking status: Never Smoker   • Smokeless tobacco: Never Used   • Alcohol use No   • Drug use: No   • Sexual activity: Defer      Comment:      Other Topics Concern   • Not on file           Objective      /55 (BP Location: Left arm, Patient Position: Lying)   Pulse 92   Temp 97.2 °F (36.2 °C) (Temporal Artery )   Resp 18   Ht 172.7 cm (68\")   Wt 109 kg (240 lb)   SpO2 98%   BMI 36.49 kg/m²     Physical Exam   Constitutional: She is oriented to person, place, and time. She appears well-developed and well-nourished. No distress.   HENT:   Head: Normocephalic and atraumatic.   Eyes: Conjunctivae are normal.   Cardiovascular: Normal rate, regular rhythm, normal heart sounds and intact distal pulses.    Pulmonary/Chest: Effort normal and breath sounds normal. No respiratory distress. She has no wheezes.   Abdominal: Soft. Bowel sounds are normal. She exhibits no distension. There is no tenderness.   Obese abdomen   Musculoskeletal: Normal range of motion. She exhibits no edema.   Neurological: She is alert and oriented to person, place, and time.   Skin: Skin is warm. Capillary refill takes less than 2 seconds.   Psychiatric: She has a normal mood and affect. Her behavior is normal. Thought content normal.   Nursing note and vitals reviewed.      Procedures           ED Course      Results for orders placed or performed during the hospital encounter of 07/21/18   Troponin   Result Value Ref Range    Troponin I <0.012 <=0.034 ng/mL   Comprehensive Metabolic Panel   Result Value Ref Range    Glucose 128 (H) 60 - 100 mg/dL    " BUN 13 7 - 21 mg/dL    Creatinine 0.79 0.50 - 1.00 mg/dL    Sodium 140 137 - 145 mmol/L    Potassium 3.5 3.5 - 5.1 mmol/L    Chloride 102 95 - 110 mmol/L    CO2 31.0 22.0 - 31.0 mmol/L    Calcium 9.1 8.4 - 10.2 mg/dL    Total Protein 7.2 6.3 - 8.6 g/dL    Albumin 4.00 3.40 - 4.80 g/dL    ALT (SGPT) 32 9 - 52 U/L    AST (SGOT) 24 14 - 36 U/L    Alkaline Phosphatase 108 38 - 126 U/L    Total Bilirubin 0.3 0.2 - 1.3 mg/dL    eGFR Non  Amer 75 51 - 120 mL/min/1.73    Globulin 3.2 2.3 - 3.5 gm/dL    A/G Ratio 1.3 1.1 - 1.8 g/dL    BUN/Creatinine Ratio 16.5 7.0 - 25.0    Anion Gap 7.0 5.0 - 15.0 mmol/L   BNP   Result Value Ref Range    proBNP 100.0 0.0 - 900.0 pg/mL   Protime-INR   Result Value Ref Range    Protime 13.2 11.1 - 15.3 Seconds    INR 1.02 0.80 - 1.20   CBC Auto Differential   Result Value Ref Range    WBC 12.16 (H) 3.20 - 9.80 10*3/mm3    RBC 4.62 3.77 - 5.16 10*6/mm3    Hemoglobin 12.9 12.0 - 15.5 g/dL    Hematocrit 38.7 35.0 - 45.0 %    MCV 83.8 80.0 - 98.0 fL    MCH 27.9 26.5 - 34.0 pg    MCHC 33.3 31.4 - 36.0 g/dL    RDW 14.3 11.5 - 14.5 %    RDW-SD 43.6 36.4 - 46.3 fl    MPV 9.7 8.0 - 12.0 fL    Platelets 418 150 - 450 10*3/mm3    Neutrophil % 72.7 37.0 - 80.0 %    Lymphocyte % 17.8 10.0 - 50.0 %    Monocyte % 6.4 0.0 - 12.0 %    Eosinophil % 2.7 0.0 - 7.0 %    Basophil % 0.2 0.0 - 2.0 %    Immature Grans % 0.2 0.0 - 0.5 %    Neutrophils, Absolute 8.83 (H) 2.00 - 8.60 10*3/mm3    Lymphocytes, Absolute 2.16 0.60 - 4.20 10*3/mm3    Monocytes, Absolute 0.78 0.00 - 0.90 10*3/mm3    Eosinophils, Absolute 0.33 0.00 - 0.70 10*3/mm3    Basophils, Absolute 0.03 0.00 - 0.20 10*3/mm3    Immature Grans, Absolute 0.03 (H) 0.00 - 0.02 10*3/mm3   Light Blue Top   Result Value Ref Range    Extra Tube hold for add-on    Green Top (Gel)   Result Value Ref Range    Extra Tube Hold for add-ons.    Lavender Top   Result Value Ref Range    Extra Tube hold for add-on    Gold Top - SST   Result Value Ref Range    Extra  Tube Hold for add-ons.    Light Blue Top   Result Value Ref Range    Extra Tube hold for add-on    Lavender Top   Result Value Ref Range    Extra Tube hold for add-on    Gold Top - SST   Result Value Ref Range    Extra Tube Hold for add-ons.    Green Top (Gel)   Result Value Ref Range    Extra Tube Hold for add-ons.      Xr Chest 2 View    Result Date: 7/21/2018  [facility] Patient Name: CHIQUITA BAILEY ATTENDING: PIERRE FRANKLIN REFERRING: BEN LEE ORDERING: BEN LEE ----------------------- PROCEDURE: Chest, PA Date of exam: 7/21/2018 HISTORY: Chest pain A single PA view of the chest was obtained. Study is compared to prior exam of 4/30/2018. The lungs are satisfactorily expanded and clear of infiltrates or effusions. Chronic elevation right hemidiaphragm is unchanged from prior exam. Sternal wires are seen from previous median sternotomy. The heart size is normal and the vasculature does not appear congested.The costophrenic angles are clear.     No active disease. No change from prior exam. Electronically signed by:  Obie Thomas MD  7/21/2018 2:42 PM CDT Workstation: [id number]              HEART Score (for prediction of 6-week risk of major adverse cardiac event) reviewed and/or performed as part of the patient evaluation and treatment planning process.  The result associated with this review/performance is: 4       MDM      Final diagnoses:   Chest pain, unspecified type            Ben Lee PA-C  07/21/18 5337

## 2018-07-21 NOTE — ED NOTES
Troponin scheduled for 16:57 due later because of delay in blood draw.     Gay Hernandez RN  07/21/18 2805

## 2018-07-22 LAB
ALBUMIN SERPL-MCNC: 3.2 G/DL (ref 3.4–4.8)
ALBUMIN/GLOB SERPL: 1.2 G/DL (ref 1.1–1.8)
ALP SERPL-CCNC: 98 U/L (ref 38–126)
ALT SERPL W P-5'-P-CCNC: 33 U/L (ref 9–52)
AMPHET+METHAMPHET UR QL: NEGATIVE
ANION GAP SERPL CALCULATED.3IONS-SCNC: 6 MMOL/L (ref 5–15)
AST SERPL-CCNC: 25 U/L (ref 14–36)
B PERT DNA SPEC QL NAA+PROBE: NOT DETECTED
BARBITURATES UR QL SCN: NEGATIVE
BASOPHILS # BLD AUTO: 0.04 10*3/MM3 (ref 0–0.2)
BASOPHILS NFR BLD AUTO: 0.3 % (ref 0–2)
BENZODIAZ UR QL SCN: NEGATIVE
BILIRUB SERPL-MCNC: 0.4 MG/DL (ref 0.2–1.3)
BILIRUB UR QL STRIP: NEGATIVE
BUN BLD-MCNC: 11 MG/DL (ref 7–21)
BUN/CREAT SERPL: 15.5 (ref 7–25)
C PNEUM DNA NPH QL NAA+NON-PROBE: NOT DETECTED
CALCIUM SPEC-SCNC: 8.5 MG/DL (ref 8.4–10.2)
CANNABINOIDS SERPL QL: NEGATIVE
CHLORIDE SERPL-SCNC: 106 MMOL/L (ref 95–110)
CLARITY UR: CLEAR
CO2 SERPL-SCNC: 27 MMOL/L (ref 22–31)
COCAINE UR QL: NEGATIVE
COLOR UR: YELLOW
CREAT BLD-MCNC: 0.71 MG/DL (ref 0.5–1)
DEPRECATED RDW RBC AUTO: 46.9 FL (ref 36.4–46.3)
EOSINOPHIL # BLD AUTO: 0.42 10*3/MM3 (ref 0–0.7)
EOSINOPHIL NFR BLD AUTO: 3.6 % (ref 0–7)
ERYTHROCYTE [DISTWIDTH] IN BLOOD BY AUTOMATED COUNT: 14.7 % (ref 11.5–14.5)
FLUAV H1 2009 PAND RNA NPH QL NAA+PROBE: NOT DETECTED
FLUAV H1 HA GENE NPH QL NAA+PROBE: NOT DETECTED
FLUAV H3 RNA NPH QL NAA+PROBE: NOT DETECTED
FLUAV SUBTYP SPEC NAA+PROBE: NOT DETECTED
FLUBV RNA ISLT QL NAA+PROBE: NOT DETECTED
FOLATE SERPL-MCNC: 9.3 NG/ML (ref 2.76–21)
GFR SERPL CREATININE-BSD FRML MDRD: 85 ML/MIN/1.73 (ref 51–120)
GLOBULIN UR ELPH-MCNC: 2.7 GM/DL (ref 2.3–3.5)
GLUCOSE BLD-MCNC: 145 MG/DL (ref 60–100)
GLUCOSE BLDC GLUCOMTR-MCNC: 154 MG/DL (ref 70–130)
GLUCOSE BLDC GLUCOMTR-MCNC: 158 MG/DL (ref 70–130)
GLUCOSE BLDC GLUCOMTR-MCNC: 171 MG/DL (ref 70–130)
GLUCOSE UR STRIP-MCNC: NEGATIVE MG/DL
HADV DNA SPEC NAA+PROBE: NOT DETECTED
HCOV 229E RNA SPEC QL NAA+PROBE: NOT DETECTED
HCOV HKU1 RNA SPEC QL NAA+PROBE: NOT DETECTED
HCOV NL63 RNA SPEC QL NAA+PROBE: NOT DETECTED
HCOV OC43 RNA SPEC QL NAA+PROBE: NOT DETECTED
HCT VFR BLD AUTO: 38.7 % (ref 35–45)
HGB BLD-MCNC: 12.3 G/DL (ref 12–15.5)
HGB UR QL STRIP.AUTO: NEGATIVE
HMPV RNA NPH QL NAA+NON-PROBE: NOT DETECTED
HPIV1 RNA SPEC QL NAA+PROBE: NOT DETECTED
HPIV2 RNA SPEC QL NAA+PROBE: NOT DETECTED
HPIV3 RNA NPH QL NAA+PROBE: NOT DETECTED
HPIV4 P GENE NPH QL NAA+PROBE: NOT DETECTED
IMM GRANULOCYTES # BLD: 0.03 10*3/MM3 (ref 0–0.02)
IMM GRANULOCYTES NFR BLD: 0.3 % (ref 0–0.5)
KETONES UR QL STRIP: NEGATIVE
LEUKOCYTE ESTERASE UR QL STRIP.AUTO: NEGATIVE
LYMPHOCYTES # BLD AUTO: 3.1 10*3/MM3 (ref 0.6–4.2)
LYMPHOCYTES NFR BLD AUTO: 26.8 % (ref 10–50)
M PNEUMO IGG SER IA-ACNC: NOT DETECTED
MCH RBC QN AUTO: 27.7 PG (ref 26.5–34)
MCHC RBC AUTO-ENTMCNC: 31.8 G/DL (ref 31.4–36)
MCV RBC AUTO: 87.2 FL (ref 80–98)
METHADONE UR QL SCN: NEGATIVE
MONOCYTES # BLD AUTO: 0.75 10*3/MM3 (ref 0–0.9)
MONOCYTES NFR BLD AUTO: 6.5 % (ref 0–12)
NEUTROPHILS # BLD AUTO: 7.22 10*3/MM3 (ref 2–8.6)
NEUTROPHILS NFR BLD AUTO: 62.5 % (ref 37–80)
NITRITE UR QL STRIP: NEGATIVE
OPIATES UR QL: POSITIVE
OXYCODONE UR QL SCN: NEGATIVE
PH UR STRIP.AUTO: 6 [PH] (ref 5–9)
PLATELET # BLD AUTO: 389 10*3/MM3 (ref 150–450)
PMV BLD AUTO: 10.1 FL (ref 8–12)
POTASSIUM BLD-SCNC: 3.7 MMOL/L (ref 3.5–5.1)
PROT SERPL-MCNC: 5.9 G/DL (ref 6.3–8.6)
PROT UR QL STRIP: ABNORMAL
RBC # BLD AUTO: 4.44 10*6/MM3 (ref 3.77–5.16)
RHINOVIRUS RNA SPEC NAA+PROBE: NOT DETECTED
RSV RNA NPH QL NAA+NON-PROBE: NOT DETECTED
SODIUM BLD-SCNC: 139 MMOL/L (ref 137–145)
SP GR UR STRIP: 1.01 (ref 1–1.03)
TROPONIN I SERPL-MCNC: 0.02 NG/ML
TROPONIN I SERPL-MCNC: <0.012 NG/ML
TSH SERPL DL<=0.05 MIU/L-ACNC: 3.95 MIU/ML (ref 0.46–4.68)
UROBILINOGEN UR QL STRIP: ABNORMAL
VIT B12 BLD-MCNC: 581 PG/ML (ref 239–931)
WBC NRBC COR # BLD: 11.56 10*3/MM3 (ref 3.2–9.8)
WHOLE BLOOD HOLD SPECIMEN: NORMAL

## 2018-07-22 PROCEDURE — 25010000002 ONDANSETRON PER 1 MG: Performed by: FAMILY MEDICINE

## 2018-07-22 PROCEDURE — 80307 DRUG TEST PRSMV CHEM ANLYZR: CPT | Performed by: FAMILY MEDICINE

## 2018-07-22 PROCEDURE — 85025 COMPLETE CBC W/AUTO DIFF WBC: CPT | Performed by: FAMILY MEDICINE

## 2018-07-22 PROCEDURE — 87633 RESP VIRUS 12-25 TARGETS: CPT | Performed by: FAMILY MEDICINE

## 2018-07-22 PROCEDURE — 82962 GLUCOSE BLOOD TEST: CPT

## 2018-07-22 PROCEDURE — 25010000002 CEFTRIAXONE: Performed by: FAMILY MEDICINE

## 2018-07-22 PROCEDURE — 25010000002 PROMETHAZINE PER 50 MG: Performed by: FAMILY MEDICINE

## 2018-07-22 PROCEDURE — 80053 COMPREHEN METABOLIC PANEL: CPT | Performed by: FAMILY MEDICINE

## 2018-07-22 PROCEDURE — 63710000001 INSULIN ASPART PER 5 UNITS: Performed by: FAMILY MEDICINE

## 2018-07-22 PROCEDURE — G0378 HOSPITAL OBSERVATION PER HR: HCPCS

## 2018-07-22 PROCEDURE — 84484 ASSAY OF TROPONIN QUANT: CPT | Performed by: FAMILY MEDICINE

## 2018-07-22 PROCEDURE — 87486 CHLMYD PNEUM DNA AMP PROBE: CPT | Performed by: FAMILY MEDICINE

## 2018-07-22 PROCEDURE — 87040 BLOOD CULTURE FOR BACTERIA: CPT | Performed by: FAMILY MEDICINE

## 2018-07-22 PROCEDURE — 96365 THER/PROPH/DIAG IV INF INIT: CPT

## 2018-07-22 PROCEDURE — 87581 M.PNEUMON DNA AMP PROBE: CPT | Performed by: FAMILY MEDICINE

## 2018-07-22 PROCEDURE — 63710000001 INSULIN DETEMIR PER 5 UNITS: Performed by: FAMILY MEDICINE

## 2018-07-22 PROCEDURE — 96372 THER/PROPH/DIAG INJ SC/IM: CPT

## 2018-07-22 PROCEDURE — 25010000002 ENOXAPARIN PER 10 MG: Performed by: FAMILY MEDICINE

## 2018-07-22 PROCEDURE — 87798 DETECT AGENT NOS DNA AMP: CPT | Performed by: FAMILY MEDICINE

## 2018-07-22 PROCEDURE — 96375 TX/PRO/DX INJ NEW DRUG ADDON: CPT

## 2018-07-22 PROCEDURE — 81003 URINALYSIS AUTO W/O SCOPE: CPT | Performed by: FAMILY MEDICINE

## 2018-07-22 PROCEDURE — 96376 TX/PRO/DX INJ SAME DRUG ADON: CPT

## 2018-07-22 RX ORDER — ALUMINA, MAGNESIA, AND SIMETHICONE 2400; 2400; 240 MG/30ML; MG/30ML; MG/30ML
15 SUSPENSION ORAL ONCE
Status: COMPLETED | OUTPATIENT
Start: 2018-07-22 | End: 2018-07-22

## 2018-07-22 RX ORDER — PSEUDOEPHEDRINE HCL 30 MG
30 TABLET ORAL EVERY 6 HOURS PRN
Status: DISCONTINUED | OUTPATIENT
Start: 2018-07-22 | End: 2018-07-23 | Stop reason: HOSPADM

## 2018-07-22 RX ORDER — PANTOPRAZOLE SODIUM 40 MG/1
40 TABLET, DELAYED RELEASE ORAL
Status: DISCONTINUED | OUTPATIENT
Start: 2018-07-22 | End: 2018-07-23 | Stop reason: HOSPADM

## 2018-07-22 RX ORDER — ONDANSETRON 2 MG/ML
4 INJECTION INTRAMUSCULAR; INTRAVENOUS EVERY 6 HOURS
Status: DISCONTINUED | OUTPATIENT
Start: 2018-07-22 | End: 2018-07-23 | Stop reason: HOSPADM

## 2018-07-22 RX ORDER — GABAPENTIN 300 MG/1
300 CAPSULE ORAL EVERY 8 HOURS SCHEDULED
Status: DISCONTINUED | OUTPATIENT
Start: 2018-07-22 | End: 2018-07-23 | Stop reason: HOSPADM

## 2018-07-22 RX ADMIN — ARIPIPRAZOLE 15 MG: 15 TABLET ORAL at 15:29

## 2018-07-22 RX ADMIN — HYDROCODONE BITARTRATE AND ACETAMINOPHEN 1 TABLET: 7.5; 325 TABLET ORAL at 02:02

## 2018-07-22 RX ADMIN — NITROGLYCERIN 0.5 INCH: 20 OINTMENT TOPICAL at 00:22

## 2018-07-22 RX ADMIN — PSEUDOEPHEDRINE HCL 30 MG: 30 TABLET, FILM COATED ORAL at 20:21

## 2018-07-22 RX ADMIN — GABAPENTIN 800 MG: 400 CAPSULE ORAL at 06:15

## 2018-07-22 RX ADMIN — ATORVASTATIN CALCIUM 40 MG: 40 TABLET, FILM COATED ORAL at 21:03

## 2018-07-22 RX ADMIN — ONDANSETRON 4 MG: 2 INJECTION INTRAMUSCULAR; INTRAVENOUS at 23:00

## 2018-07-22 RX ADMIN — PANTOPRAZOLE SODIUM 40 MG: 40 TABLET, DELAYED RELEASE ORAL at 17:09

## 2018-07-22 RX ADMIN — CLOPIDOGREL BISULFATE 75 MG: 75 TABLET ORAL at 15:29

## 2018-07-22 RX ADMIN — GABAPENTIN 300 MG: 300 CAPSULE ORAL at 21:03

## 2018-07-22 RX ADMIN — HYDROCODONE BITARTRATE AND ACETAMINOPHEN 1 TABLET: 7.5; 325 TABLET ORAL at 15:28

## 2018-07-22 RX ADMIN — ENOXAPARIN SODIUM 40 MG: 40 INJECTION SUBCUTANEOUS at 21:04

## 2018-07-22 RX ADMIN — CEFTRIAXONE 1 G: 1 INJECTION, POWDER, FOR SOLUTION INTRAMUSCULAR; INTRAVENOUS at 15:30

## 2018-07-22 RX ADMIN — ALUMINUM HYDROXIDE, MAGNESIUM HYDROXIDE, AND DIMETHICONE 15 ML: 400; 400; 40 SUSPENSION ORAL at 11:00

## 2018-07-22 RX ADMIN — ONDANSETRON 4 MG: 2 INJECTION INTRAMUSCULAR; INTRAVENOUS at 17:09

## 2018-07-22 RX ADMIN — INSULIN ASPART 60 UNITS: 100 INJECTION, SOLUTION INTRAVENOUS; SUBCUTANEOUS at 17:10

## 2018-07-22 RX ADMIN — ASPIRIN 81 MG 81 MG: 81 TABLET ORAL at 15:29

## 2018-07-22 RX ADMIN — SODIUM CHLORIDE 100 ML/HR: 900 INJECTION INTRAVENOUS at 15:28

## 2018-07-22 RX ADMIN — FUROSEMIDE 40 MG: 40 TABLET ORAL at 15:29

## 2018-07-22 RX ADMIN — PROMETHAZINE HYDROCHLORIDE 12.5 MG: 25 INJECTION INTRAMUSCULAR; INTRAVENOUS at 08:13

## 2018-07-22 RX ADMIN — ONDANSETRON 4 MG: 2 INJECTION INTRAMUSCULAR; INTRAVENOUS at 11:00

## 2018-07-22 RX ADMIN — NITROGLYCERIN 0.5 INCH: 20 OINTMENT TOPICAL at 06:17

## 2018-07-22 RX ADMIN — METOPROLOL SUCCINATE 25 MG: 25 TABLET, FILM COATED, EXTENDED RELEASE ORAL at 15:28

## 2018-07-22 RX ADMIN — MIRTAZAPINE 45 MG: 15 TABLET, FILM COATED ORAL at 21:03

## 2018-07-22 RX ADMIN — INSULIN DETEMIR 100 UNITS: 100 INJECTION, SOLUTION SUBCUTANEOUS at 21:07

## 2018-07-22 RX ADMIN — LIDOCAINE HYDROCHLORIDE 15 ML: 20 SOLUTION ORAL; TOPICAL at 15:35

## 2018-07-22 RX ADMIN — PANTOPRAZOLE SODIUM 40 MG: 40 TABLET, DELAYED RELEASE ORAL at 06:15

## 2018-07-22 NOTE — PROGRESS NOTES
[facility] Medicine Services  INPATIENT PROGRESS NOTE    Length of Stay: 0  Date of Admission: 7/21/2018  Primary Care Physician: Francisca Fong MD    Subjective     Chief Complaint   Patient presents with   • Chest Pain   • Shortness of Breath       HPI:  Patient was seen and examined this morning. Feels better, chest pain resolved.   Still complains of weakness and feeling tired     Review of Systems     All pertinent negatives and positives are as above. All other systems have been reviewed and are negative unless otherwise stated.   Objective    Physical exam    Temp:  [97.2 °F (36.2 °C)-98.4 °F (36.9 °C)] 97.8 °F (36.6 °C)  Heart Rate:  [83-98] 98  Resp:  [18] 18  BP: (111-133)/(51-69) 133/60    -Constitutional: Patient is AAO x 3. Patient appears well-developed and well-nourished.   -CVS: Normal rate, regular rhythm, normal heart sounds and intact distal pulses.  Exam reveals no gallop and no friction rub.  No murmur heard.  -Pulm: Effort normal and breath sounds normal. No respiratory distress. No wheezes, rales or ronchi.  -Abdominal: Soft. Bowel sounds are normal. No distension, no tenderness. There is no rebound and no guarding. No hernia.   -Musculoskeletal: No Cyanosis clubbing or edema.    Results Review:    Laboratory Data:     Results from last 7 days  Lab Units 07/22/18  1031 07/21/18  1644   SODIUM mmol/L 139 140   POTASSIUM mmol/L 3.7 3.5   CHLORIDE mmol/L 106 102   CO2 mmol/L 27.0 31.0   BUN mg/dL 11 13   CREATININE mg/dL 0.71 0.79   GLUCOSE mg/dL 145* 128*   CALCIUM mg/dL 8.5 9.1   BILIRUBIN mg/dL 0.4 0.3   ALK PHOS U/L 98 108   ALT (SGPT) U/L 33 32   AST (SGOT) U/L 25 24   ANION GAP mmol/L 6.0 7.0     Estimated Creatinine Clearance: 121.1 mL/min (by C-G formula based on SCr of 0.71 mg/dL).            Results from last 7 days  Lab Units 07/22/18  0158 07/21/18  1644   WBC 10*3/mm3 11.56* 12.16*   HEMOGLOBIN g/dL 12.3 12.9   HEMATOCRIT % 38.7 38.7    PLATELETS 10*3/mm3 389 418       Results from last 7 days  Lab Units 07/21/18  1644   INR  1.02       Culture Data:   No results found for: BLOODCX  No results found for: URINECX  No results found for: RESPCX  No results found for: WOUNDCX  No results found for: STOOLCX  No components found for: BODYFLD    Microbiology                            Radiology Data:   Imaging Results (all)     Procedure Component Value Units Date/Time    US Venous Doppler Lower Extremity Bilateral (duplex) [[id number]] Collected:  07/21/18 2015     Updated:  07/22/18 1027    Narrative:       .  EXAMINATION:  Ultrasound, venous, lower extremity    CLINICAL INDICATION / HISTORY:   swelling, R07.9 Chest pain,  unspecified E53.8 Deficiency of other specified B group vitamins     COMPARISON:  none  TECHNIQUE:  Bilateral lower extremity(ies)                          serial axial graded compression technique                          grayscale, color flow, spectral and  Doppler     FINDINGS:    Imaged vessels:      - common femoral vein (CFV)      - deep femoral vein (FV) (formally called superficial femoral  vein (SFV))      - profunda femoral vein (PFV) (cephalad portion)   - popliteal vein (PV)    - posterior tibial vein (PTV)       - greater saphenous vein (GSV) (non-contiguous segments)        Unless otherwise indicated, all of the above described vessels  were freely compressible and demonstrated spontaneous and phasic  flow as well as appropriate flow with augmentation.       .      Impression:       CONCLUSION:    1. Negative examination - no evidence of deep venous thrombosis  within the femoral-popliteal system of the bilateral lower  extremity(ies).                  Electronically signed by:  ASHLEY Mejia MD  7/21/2018 8:43  PM CDT Workstation: [id number]    CT Angiogram Chest With Contrast [[id number]] Collected:  07/21/18 1959     Updated:  07/21/18 2047    Narrative:         EXAM:  Computed Tomography with CTA         REGION:   Chest       INDICATION:  SOB ;  Chest pain, acute, PE suspected, intermed  prob, negative D-dimer, R07.9 Chest pain, unspecified E53.8  Deficiency of other specified B group vitamins    - rule out pulmonary embolism       CLINICAL HISTORY:  CORRELATIVE IMAGING:  None                         TECHNIQUE:     - PE / vascular protocol     - reconstructions:  axial, coronal, sagittal, obliques     - computer-generated 3D reconstructions (MIPS) were performed.     - contrast:  intravenous ;  Isovue 370,     75 mL                                This exam was performed according to the departmental  dose-optimization program which includes automated exposure  control, adjustment of the mA and/or kV according to patient size  and/or use of iterative reconstruction technique.         COMMENTS:    - Pulmonary arterial system:      - Main pulmonary artery trunk:  negative      - Left, right main pulmonary arteries: negative      - Lobar arteries: negative       - Segmental arteries: negative      - Systemic vascularity (as visualized):        - Aorta:  grossly negative / normal caliber / no dissection        - roots of great vessels:  grossly negative / normal  caliber        - SVC / IVC:  grossly negative / normal caliber     - Misc (limited visualization):      - pulmonary parenchyma:  negative      - pleura:  negative      - mediastinal / maria guadalupe:  negative      - neck, inferior:  grossly wnl      - subdiaphragmatic structures:  grossly negative (limited  evaluation)       - osseous:      - misc:     .        Impression:       CONCLUSION:          1.  No evidence of pulmonary embolism.            2.  No evidence of pathology associated with the visualized  aorta.                                                                  Electronically signed by:  ASHLEY Mejia MD  7/21/2018 8:46  PM CDT Workstation: [id number]    XR Chest 2 View [[id number]] Collected:  07/21/18 1422     Updated:  07/21/18 1443    Narrative:          [facility]    Patient Name: CHIQUITA BAILEY    ATTENDING: PIERRE FRANKLIN     REFERRING: HOMER LEE    ORDERING: HOMER LEE    -----------------------    PROCEDURE: Chest, PA    Date of exam: 7/21/2018    HISTORY: Chest pain    A single PA view of the chest was obtained. Study is compared to  prior exam of 4/30/2018.    The lungs are satisfactorily expanded and clear of infiltrates or  effusions. Chronic elevation right hemidiaphragm is unchanged  from prior exam. Sternal wires are seen from previous median  sternotomy. The heart size is normal and the vasculature does not  appear congested.The costophrenic angles are clear.       Impression:       No active disease. No change from prior exam.      Electronically signed by:  Obie Thomas MD  7/21/2018 2:42 PM  CDT Workstation: [id number]          I have reviewed the patient's current medications.   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, with long-term current use of insulin (CMS/HCC)    Overview Addendum 11/28/2017  8:04 AM by True Sharma MD     Continue home meds  Watch for hypoglcemia   - glucose levels have been normal         Depression, major, recurrent, moderate (CMS/HCC)    Overview Signed 8/27/2017 10:36 PM by Ethel Cooley MD     Continue Effexor and Abilify         GERD without esophagitis    Overview Signed 8/27/2017 10:34 PM by Ethel Cooley MD     Continue protonix         Mixed hyperlipidemia    Overview Signed 8/27/2017 10:33 PM by Ethel Cooley MD     Continue Statin          Vitamin D deficiency    (HFpEF) heart failure with preserved ejection fraction (CMS/HCC)    Overview Deleted 5/1/2018  1:24 PM by BEBE Daniel            Diabetic peripheral neuropathy associated with type 2 diabetes mellitus (CMS/HCC)    Overview Addendum 11/27/2017 11:21 PM by Court Pablo MD     Continue Gabapentin 800MG TID.         B12  deficiency    Class 3 obesity due to excess calories without serious comorbidity with body mass index (BMI) of 40.0 to 44.9 in adult (CMS/[facility])    Chest pain          Assessment / Plan    --chest pain :  Recent stress test was low risk . Patient also had recent ECHO.   trops negative .    --leukocytosis : complains of weakness and feeling tired . Will obtain UA, blood cultures, resp panel and flu panel , and start empiric rocephin  For now  Check TSH and B12 levels , UDS.    --elevated D-dimer :negative CTA and LE US .           7/21/18  Chest pain: trend cardiac enzymes, Ddimer, US lower ext, tele, nitro for pain, she had echo about 2 months ago.   -GERD: protonix  -T2DM: continue with home dose of 60 units of novolog, and 100 units of levemir. She states that's what she has been taking.   -Anxiety: ativan   -HTN: continue with home medications.   - CAD: statin, plavix, BB  - Lower ext swelling: right appear to be larger with calf tenderness, will obtain US.   -. Depression: venlafaxine     This document has been electronically signed by Warren Castrejon MD on July 22, 2018 4:03 PM

## 2018-07-22 NOTE — PLAN OF CARE
Problem: Patient Care Overview  Goal: Plan of Care Review  Outcome: Ongoing (interventions implemented as appropriate)   07/21/18 1940   Coping/Psychosocial   Plan of Care Reviewed With patient   Plan of Care Review   Progress no change   OTHER   Outcome Summary new admit

## 2018-07-22 NOTE — PLAN OF CARE
Problem: Patient Care Overview  Goal: Plan of Care Review  Outcome: Ongoing (interventions implemented as appropriate)   07/22/18 3638   Coping/Psychosocial   Plan of Care Reviewed With patient   Plan of Care Review   Progress no change     Goal: Individualization and Mutuality  Outcome: Ongoing (interventions implemented as appropriate)      Problem: Cardiac: ACS (Acute Coronary Syndrome) (Adult)  Goal: Signs and Symptoms of Listed Potential Problems Will be Absent, Minimized or Managed (Cardiac: ACS)  Outcome: Ongoing (interventions implemented as appropriate)      Problem: Pain, Acute (Adult)  Goal: Identify Related Risk Factors and Signs and Symptoms  Outcome: Ongoing (interventions implemented as appropriate)    Goal: Acceptable Pain Control/Comfort Level  Outcome: Ongoing (interventions implemented as appropriate)

## 2018-07-23 VITALS
RESPIRATION RATE: 18 BRPM | BODY MASS INDEX: 41.22 KG/M2 | HEIGHT: 68 IN | SYSTOLIC BLOOD PRESSURE: 138 MMHG | WEIGHT: 272 LBS | DIASTOLIC BLOOD PRESSURE: 64 MMHG | TEMPERATURE: 97 F | HEART RATE: 74 BPM | OXYGEN SATURATION: 95 %

## 2018-07-23 LAB
ALBUMIN SERPL-MCNC: 3.2 G/DL (ref 3.4–4.8)
ALBUMIN/GLOB SERPL: 1.1 G/DL (ref 1.1–1.8)
ALP SERPL-CCNC: 88 U/L (ref 38–126)
ALT SERPL W P-5'-P-CCNC: 32 U/L (ref 9–52)
AMPHET+METHAMPHET UR QL: NEGATIVE
ANION GAP SERPL CALCULATED.3IONS-SCNC: 5 MMOL/L (ref 5–15)
AST SERPL-CCNC: 24 U/L (ref 14–36)
BARBITURATES UR QL SCN: NEGATIVE
BASOPHILS # BLD AUTO: 0.02 10*3/MM3 (ref 0–0.2)
BASOPHILS NFR BLD AUTO: 0.2 % (ref 0–2)
BENZODIAZ UR QL SCN: NEGATIVE
BILIRUB SERPL-MCNC: 0.2 MG/DL (ref 0.2–1.3)
BUN BLD-MCNC: 9 MG/DL (ref 7–21)
BUN/CREAT SERPL: 11.1 (ref 7–25)
CALCIUM SPEC-SCNC: 8.1 MG/DL (ref 8.4–10.2)
CANNABINOIDS SERPL QL: NEGATIVE
CHLORIDE SERPL-SCNC: 107 MMOL/L (ref 95–110)
CO2 SERPL-SCNC: 29 MMOL/L (ref 22–31)
COCAINE UR QL: NEGATIVE
CREAT BLD-MCNC: 0.81 MG/DL (ref 0.5–1)
DEPRECATED RDW RBC AUTO: 45 FL (ref 36.4–46.3)
EOSINOPHIL # BLD AUTO: 0.49 10*3/MM3 (ref 0–0.7)
EOSINOPHIL NFR BLD AUTO: 4.9 % (ref 0–7)
ERYTHROCYTE [DISTWIDTH] IN BLOOD BY AUTOMATED COUNT: 14.4 % (ref 11.5–14.5)
GFR SERPL CREATININE-BSD FRML MDRD: 73 ML/MIN/1.73 (ref 51–120)
GLOBULIN UR ELPH-MCNC: 2.9 GM/DL (ref 2.3–3.5)
GLUCOSE BLD-MCNC: 136 MG/DL (ref 60–100)
GLUCOSE BLDC GLUCOMTR-MCNC: 191 MG/DL (ref 70–130)
HCT VFR BLD AUTO: 34.3 % (ref 35–45)
HGB BLD-MCNC: 11.1 G/DL (ref 12–15.5)
IMM GRANULOCYTES # BLD: 0.04 10*3/MM3 (ref 0–0.02)
IMM GRANULOCYTES NFR BLD: 0.4 % (ref 0–0.5)
LYMPHOCYTES # BLD AUTO: 2.52 10*3/MM3 (ref 0.6–4.2)
LYMPHOCYTES NFR BLD AUTO: 25.2 % (ref 10–50)
MCH RBC QN AUTO: 27.8 PG (ref 26.5–34)
MCHC RBC AUTO-ENTMCNC: 32.4 G/DL (ref 31.4–36)
MCV RBC AUTO: 85.8 FL (ref 80–98)
METHADONE UR QL SCN: NEGATIVE
MONOCYTES # BLD AUTO: 0.73 10*3/MM3 (ref 0–0.9)
MONOCYTES NFR BLD AUTO: 7.3 % (ref 0–12)
NEUTROPHILS # BLD AUTO: 6.2 10*3/MM3 (ref 2–8.6)
NEUTROPHILS NFR BLD AUTO: 62 % (ref 37–80)
OPIATES UR QL: POSITIVE
OXYCODONE UR QL SCN: NEGATIVE
PLATELET # BLD AUTO: 372 10*3/MM3 (ref 150–450)
PMV BLD AUTO: 9.4 FL (ref 8–12)
POTASSIUM BLD-SCNC: 3.5 MMOL/L (ref 3.5–5.1)
PROT SERPL-MCNC: 6.1 G/DL (ref 6.3–8.6)
RBC # BLD AUTO: 4 10*6/MM3 (ref 3.77–5.16)
SODIUM BLD-SCNC: 141 MMOL/L (ref 137–145)
WBC NRBC COR # BLD: 10 10*3/MM3 (ref 3.2–9.8)

## 2018-07-23 PROCEDURE — 96376 TX/PRO/DX INJ SAME DRUG ADON: CPT

## 2018-07-23 PROCEDURE — 86666 EHRLICHIA ANTIBODY: CPT | Performed by: FAMILY MEDICINE

## 2018-07-23 PROCEDURE — G8987 SELF CARE CURRENT STATUS: HCPCS

## 2018-07-23 PROCEDURE — 85025 COMPLETE CBC W/AUTO DIFF WBC: CPT | Performed by: FAMILY MEDICINE

## 2018-07-23 PROCEDURE — 80053 COMPREHEN METABOLIC PANEL: CPT | Performed by: FAMILY MEDICINE

## 2018-07-23 PROCEDURE — 97165 OT EVAL LOW COMPLEX 30 MIN: CPT

## 2018-07-23 PROCEDURE — 25010000002 ONDANSETRON PER 1 MG: Performed by: FAMILY MEDICINE

## 2018-07-23 PROCEDURE — 82962 GLUCOSE BLOOD TEST: CPT

## 2018-07-23 PROCEDURE — G0378 HOSPITAL OBSERVATION PER HR: HCPCS

## 2018-07-23 PROCEDURE — 86757 RICKETTSIA ANTIBODY: CPT | Performed by: FAMILY MEDICINE

## 2018-07-23 PROCEDURE — 86753 PROTOZOA ANTIBODY NOS: CPT | Performed by: FAMILY MEDICINE

## 2018-07-23 PROCEDURE — G8988 SELF CARE GOAL STATUS: HCPCS

## 2018-07-23 PROCEDURE — G8989 SELF CARE D/C STATUS: HCPCS

## 2018-07-23 PROCEDURE — 86618 LYME DISEASE ANTIBODY: CPT | Performed by: FAMILY MEDICINE

## 2018-07-23 RX ORDER — AMOXICILLIN AND CLAVULANATE POTASSIUM 875; 125 MG/1; MG/1
1 TABLET, FILM COATED ORAL EVERY 12 HOURS SCHEDULED
Qty: 6 TABLET | Refills: 0 | Status: SHIPPED | OUTPATIENT
Start: 2018-07-23 | End: 2018-07-26

## 2018-07-23 RX ORDER — GABAPENTIN 800 MG/1
400 TABLET ORAL 3 TIMES DAILY
Qty: 90 TABLET | Refills: 5
Start: 2018-07-23 | End: 2018-08-02

## 2018-07-23 RX ADMIN — LORAZEPAM 1 MG: 1 TABLET ORAL at 08:03

## 2018-07-23 RX ADMIN — FUROSEMIDE 40 MG: 40 TABLET ORAL at 08:02

## 2018-07-23 RX ADMIN — ARIPIPRAZOLE 15 MG: 15 TABLET ORAL at 08:03

## 2018-07-23 RX ADMIN — ASPIRIN 81 MG 81 MG: 81 TABLET ORAL at 08:03

## 2018-07-23 RX ADMIN — HYDROCODONE BITARTRATE AND ACETAMINOPHEN 1 TABLET: 7.5; 325 TABLET ORAL at 05:30

## 2018-07-23 RX ADMIN — METOPROLOL SUCCINATE 25 MG: 25 TABLET, FILM COATED, EXTENDED RELEASE ORAL at 08:03

## 2018-07-23 RX ADMIN — ONDANSETRON 4 MG: 2 INJECTION INTRAMUSCULAR; INTRAVENOUS at 05:31

## 2018-07-23 RX ADMIN — ONDANSETRON 4 MG: 2 INJECTION INTRAMUSCULAR; INTRAVENOUS at 11:49

## 2018-07-23 RX ADMIN — PANTOPRAZOLE SODIUM 40 MG: 40 TABLET, DELAYED RELEASE ORAL at 08:02

## 2018-07-23 RX ADMIN — SODIUM CHLORIDE 100 ML/HR: 900 INJECTION INTRAVENOUS at 06:30

## 2018-07-23 RX ADMIN — CLOPIDOGREL BISULFATE 75 MG: 75 TABLET ORAL at 08:02

## 2018-07-23 RX ADMIN — GABAPENTIN 300 MG: 300 CAPSULE ORAL at 05:30

## 2018-07-23 RX ADMIN — VENLAFAXINE HYDROCHLORIDE 150 MG: 75 CAPSULE, EXTENDED RELEASE ORAL at 08:02

## 2018-07-23 NOTE — THERAPY DISCHARGE NOTE
Acute Care - Occupational Therapy Initial Eval/Discharge  [facility]     Patient Name: Ariana Martinez  : 1960  MRN: [id number]  Today's Date: 2018  Onset of Illness/Injury or Date of Surgery: 18  Date of Referral to OT: 18  Referring Physician: Dr. RIP Castrejon      Admit Date: 2018       ICD-10-CM ICD-9-CM   1. Chest pain, unspecified type R07.9 786.50   2. Low vitamin B12 level E53.8 266.2   3. Impaired mobility and ADLs Z74.09 799.89     Patient Active Problem List   Diagnosis   • 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, with long-term current use of insulin (CMS/[facility])   • Closed nondisplaced fracture of fifth left metatarsal bone   • MAURICIO (generalized anxiety disorder)   • Depression, major, recurrent, moderate (CMS/HCC)   • GERD without esophagitis   • Long term prescription opiate use   • Chronic pain of multiple joints   • Mixed hyperlipidemia   • Vitamin D deficiency   • Seasonal allergic rhinitis   • Restrictive lung disease secondary to obesity   • Snoring   • (HFpEF) heart failure with preserved ejection fraction (CMS/HCC)   • Diabetic peripheral neuropathy associated with type 2 diabetes mellitus (CMS/HCC)   • Acute renal failure superimposed on stage 3 chronic kidney disease (CMS/[facility])   • B12 deficiency   • Chronic epigastric pain 2/2 gastroparesis   • Class 3 obesity due to excess calories without serious comorbidity with body mass index (BMI) of 40.0 to 44.9 in adult (CMS/[facility])   • Chest pain   • H/O intermittent claudication     Past Medical History:   Diagnosis Date   • Acquired hypothyroidism    • Angina, class IV (CMS/HCC)    • Anxiety    • Benign paroxysmal positional vertigo    • Carpal tunnel syndrome    • Chronic pain    • Coronary atherosclerosis    • Depression    • Diabetes mellitus (CMS/HCC)     Type 2, controlled   • Diabetic polyneuropathy (CMS/[facility])    • Female stress incontinence    • Gastroparesis    • Hashimoto's thyroiditis     • Hyperlipidemia    • Hypertension    • Low back pain    • Malaise and fatigue    • Multiple joint pain    • Obesity     Refuses to be weighed   • Otalgia     Both   • Perforation of tympanic membrane     Left   • Postoperative wound infection    • Vitamin D deficiency      Past Surgical History:   Procedure Laterality Date   • ABDOMINAL SURGERY     • ANGIOPLASTY      coronary   • BREAST BIOPSY Right    • CARDIAC CATHETERIZATION     • CARDIAC CATHETERIZATION N/A 6/20/2017    Procedure: Right Heart Cath;  Surgeon: Can Kwon MD PhD;  Location: [facility] CATH INVASIVE LOCATION;  Service:    • CARPAL TUNNEL RELEASE     • CHOLECYSTECTOMY     • CORONARY ARTERY BYPASS GRAFT  2005   • ENDOSCOPY AND COLONOSCOPY     • FOOT SURGERY      Toes   • GASTRIC BANDING      Revision, laparoscopic   • HYSTERECTOMY     • MOUTH SURGERY     • SALPINGO OOPHORECTOMY     • SHOULDER SURGERY     • TRANSESOPHAGEAL ECHOCARDIOGRAM (LAMONTE)      With color flow          OT ASSESSMENT FLOWSHEET (last 72 hours)      Occupational Therapy Evaluation     Row Name 07/23/18 0855                   OT Evaluation Time/Intention    Subjective Information complains of;pain  -        Document Type evaluation;discharge evaluation/summary  -        Mode of Treatment individual therapy;occupational therapy  -        Total Evaluation Minutes, Occupational Therapy 29  -        Patient Effort good  -        Symptoms Noted During/After Treatment none  -        Comment Pt could have benefit from therapy but d/c from hospital in am after OT eval.   -           General Information    Patient Profile Reviewed? yes  -        Onset of Illness/Injury or Date of Surgery 07/21/18  -        Referring Physician Dr. RIP Castrejon  -        Patient Observations alert;cooperative;agree to therapy  -        Patient/Family Observations no family present  -        General Observations of Patient pt sidelying on the left, IV, tele, bed exit  -         Prior Level of Function independent:;all household mobility;transfer;community mobility;ADL's;home management;cooking;cleaning;driving   share IADL, driving  -        Equipment Currently Used at Home raised toilet  -        Pertinent History of Current Functional Problem pt came in with chest pain.   -        Existing Precautions/Restrictions fall  -        Risks Reviewed patient:  -        Benefits Reviewed patient:  -        Barriers to Rehab none identified  -           Relationship/Environment    Lives With spouse  -           Resource/Environmental Concerns    Current Living Arrangements home/apartment/condo  -           Home Main Entrance    Number of Stairs, Main Entrance two  -        Stair Railings, Main Entrance railings on both sides of stairs  -           Cognitive Assessment/Interventions    Additional Documentation Cognitive Assessment/Intervention (Group)  -           Cognitive Assessment/Intervention- PT/OT    Affect/Mental Status (Cognitive) WFL  -        Orientation Status (Cognition) oriented x 4  -        Follows Commands (Cognition) over 90% accuracy;verbal cues/prompting required  -        Safety Deficit (Cognitive) mild deficit  -        Personal Safety Interventions fall prevention program maintained;gait belt;supervised activity;nonskid shoes/slippers when out of bed  -           Safety Issues, Functional Mobility    Safety Issues Affecting Function (Mobility) safety precaution awareness;safety precautions follow-through/compliance  [facility]        Impairments Affecting Function (Mobility) endurance/activity tolerance;strength;balance  -           Bed Mobility Assessment/Treatment    Bed Mobility Assessment/Treatment supine-sit-supine  -        Supine-Sit-Supine [place] (Bed Mobility) conditional independence;supervision  -           Functional Mobility    Functional Mobility- Ind. Level contact guard assist;verbal cues required  [facility]        Functional  Mobility- Comment vc for safety in room, no AD  -           Transfer Assessment/Treatment    Transfer Assessment/Treatment sit-stand transfer;stand-sit transfer;toilet transfer  -           Sit-Stand Transfer    Sit-Stand [place] (Transfers) contact guard;stand by assist  -           Stand-Sit Transfer    Stand-Sit [place] (Transfers) contact guard;stand by assist  -           Toilet Transfer    Type (Toilet Transfer) stand-sit;sit-stand  -        [place] Level (Toilet Transfer) contact guard  -        Assistive Device (Toilet Transfer) grab bars/safety frame  -           ADL Assessment/Intervention    BADL Assessment/Intervention bathing;lower body dressing  -           Bathing Assessment/Intervention    Comment (Bathing) educated on bath safey to have  present, pt simulated a shower fair safety, declined to take a bath during therapy wanted to wait for her  agreed to do one tomorrow. Educated on energy conservation with ADL. Pt mentioned she was going to get a bath when her  got here. OT offered to let pt complete as part of therapy to grade independence level but pt declined wanted to wait for her  stated she would next day.   -           Lower Body Dressing Assessment/Training    Lower Body Dressing [place] Level don;socks;set up;independent  -        Lower Body Dressing Position sitting up in bed  -           BADL Safety/Performance    Impairments, BADL Safety/Performance balance;endurance/activity tolerance;strength  -           General ROM    GENERAL ROM COMMENTS BUE WFL not full range. BUE fair digit to thumb, right hand noted to be swollen  -           General Assessment (Manual Muscle Testing)    General Manual Muscle Testing (MMT) Assessment upper extremity strength deficits identified  -           Upper Extremity (Manual Muscle Testing)    Comment, MMT: Upper Extremity BUE  grossly 4- to 4/5; BUE grossly 4- to 4/5  -            Sensory Assessment/Intervention    Additional Documentation Hearing Assessment (Group);Vision Assessment/Intervention (Group)  -           Light Touch Sensation Assessment    Left Upper Extremity: Light Touch Sensation Assessment intact  -        Right Upper Extremity: Light Touch Sensation Assessment intact  -           Hearing Assessment    Hearing Status WFL  -           Vision Assessment/Intervention    Visual Impairment/Limitations WFL  -           Positioning and Restraints    Pre-Treatment Position in bed  -        Post Treatment Position bed  -BH        In Bed notified nsg;supine;call light within reach;encouraged to call for assist;exit alarm on  -           Pain Assessment    Additional Documentation Pain Scale: Numbers Pre/Post-Treatment (Group)  -           Pain Scale: Numbers Pre/Post-Treatment    Pain Scale: Numbers, Pretreatment 6/10  -BH        Pain Scale: Numbers, Post-Treatment 6/10  -        Pain Location abdomen  -        Pain Intervention(s) Medication (See MAR);Repositioned;Ambulation/increased activity   pt reports had pain meds  -           Plan of Care Review    Plan of Care Reviewed With patient  -           Clinical Impression (OT)    Date of Referral to OT 07/22/18  -        OT Diagnosis Impaired mobility and ADL  -        Prognosis (OT Eval) good  -        Functional Level at Time of Evaluation (OT Eval) Pt min safety at times mostly SBA To CGA with activities  -        Patient/Family Goals Statement (OT Eval) to go home  -        Criteria for Skilled Therapeutic Interventions Met (OT Eval) yes;treatment indicated  -        Therapy Frequency (OT Eval) evaluation only   could have benefits but pt d/c after eval  -        Predicted Duration of Therapy Intervention (Therapy Eval) pt d/c hospital in am after OT eval  -        Care Plan Review (OT) evaluation/treatment results reviewed;care plan/treatment goals reviewed;risks/benefits  reviewed;patient/other agree to care plan  -        Anticipated Discharge Disposition (OT) home with assist  -           Vital Signs    Pre Systolic BP Rehab 120  -BH        Pre Treatment Diastolic BP 59  -BH        Post Systolic BP Rehab 118  -BH        Post Treatment Diastolic BP 56  -BH        Pretreatment Heart Rate (beats/min) 74  -BH        Intratreatment Heart Rate (beats/min) 77  -BH        Posttreatment Heart Rate (beats/min) 74  -BH        Pre SpO2 (%) 91  -BH        O2 Delivery Pre Treatment room air  -BH        Intra SpO2 (%) 94  -BH        O2 Delivery Intra Treatment room air  -BH        Post SpO2 (%) 91  -BH        O2 Delivery Post Treatment room air  -BH        Pre Patient Position Supine  -BH        Intra Patient Position Sitting  -BH        Post Patient Position Supine  -BH           Planned OT Interventions    Planned Therapy Interventions (OT Eval) activity tolerance training;adaptive equipment training;BADL retraining;functional balance retraining;IADL retraining;occupation/activity based interventions;patient/caregiver education/training;ROM/therapeutic exercise;strengthening exercise;transfer/mobility retraining  -           Living Environment    Home Accessibility tub/shower is not walk in;stairs to enter home  -          User Key  (r) = Recorded By, (t) = Taken By, (c) = Cosigned By    Initials Name Effective Dates     Florence Meredith, OTR/L 06/08/18 -           Occupational Therapy Education     Title: PT OT SLP Therapies (Resolved)     Topic: Occupational Therapy (Resolved)     Point: ADL training (Resolved)     Description: Instruct learner(s) on proper safety adaptation and remediation techniques during self care or transfers.   Instruct in proper use of assistive devices.   Learning Progress Summary     Learner Status Readiness Method Response Comment Documented by    Patient Done Acceptance E VU Educated about OT and POC. Education started on home safety with ADL. Educated to  call for assist.  07/23/18 1302                      User Key     Initials Effective Dates Name Provider Type Discipline     06/08/18 -  Florence Meredith, OTR/L Occupational Therapist OT                OT Recommendation and Plan  Outcome Summary/Treatment Plan (OT)  Anticipated Discharge Disposition (OT): home with assist  Planned Therapy Interventions (OT Eval): activity tolerance training, adaptive equipment training, BADL retraining, functional balance retraining, IADL retraining, occupation/activity based interventions, patient/caregiver education/training, ROM/therapeutic exercise, strengthening exercise, transfer/mobility retraining  Therapy Frequency (OT Eval): evaluation only (could have benefits but pt d/c after eval)  Plan of Care Review  Plan of Care Reviewed With: patient  Plan of Care Reviewed With: patient  Outcome Summary: OT eval completed this date. Pt mod I with bed mobility, close SBA to CGA for sit to stand off bed, CGA for sit to stand of the toilet, CGA for mobility around room. pt with set up mod I with don socks. Pt could have benefited from further skilled OT to increase endurance, strength, safety and indepence with ADL, However, pt d/c from hospital after OT eval home. Pt educateion started on home safety with ADL.                Outcome Measures     Row Name 07/23/18 0855             How much help from another is currently needed...    Putting on and taking off regular lower body clothing? 3  -BH      Bathing (including washing, rinsing, and drying) 3  -BH      Toileting (which includes using toilet bed pan or urinal) 3  -BH      Putting on and taking off regular upper body clothing 4  -BH      Taking care of personal grooming (such as brushing teeth) 4  -BH      Eating meals 4  -      Score 21  -         Functional Assessment    Outcome Measure Options AM-PAC 6 Clicks Daily Activity (OT)  -        User Key  (r) = Recorded By, (t) = Taken By, (c) = Cosigned By    Initials Name  Provider Type     Florence Meredith OTR/L Occupational Therapist          Time Calculation:         Time Calculation- OT     Row Name 07/23/18 1305             Time Calculation-     OT Start Time 0855  -      OT Stop Time 0924  -      OT Time Calculation (min) 29 min  -      OT Received On 07/23/18  -      OT Goal Re-Cert Due Date 08/05/18  -        User Key  (r) = Recorded By, (t) = Taken By, (c) = Cosigned By    Initials Name Provider Type     Florence Meredith OTR/L Occupational Therapist        Therapy Suggested Charges     Code   Minutes Charges    None           Therapy Charges for Today     Code Description Service Date Service Provider Modifiers Qty    07346373511 HC OT SELFCARE CURRENT 7/23/2018 Florence Meredith OTR/L GO, CI 1    47294183071 HC OT SELFCARE PROJECTED 7/23/2018 Florence Meredith OTR/L GO, CI 1    30353080072 HC OT SELFCARE DISCHARGE 7/23/2018 Florence Meredith OTR/L GO, CI 1    79790092509 HC OT EVAL LOW COMPLEXITY 2 7/23/2018 Florence Meredith OTR/L GO 1          OT G-codes  OT Professional Judgement Used?: Yes  OT Functional Scales Options: AM-PAC 6 Clicks Daily Activity (OT)  Score: 21  Functional Limitation: Self care  Self Care Current Status (): At least 1 percent but less than 20 percent impaired, limited or restricted  Self Care Goal Status (): At least 1 percent but less than 20 percent impaired, limited or restricted  Self Care Discharge Status (): At least 1 percent but less than 20 percent impaired, limited or restricted    OT Discharge Summary  Anticipated Discharge Disposition (OT): home with assist  Reason for Discharge: Discharge from facility  Outcomes Achieved: Discharge from facility occurred on same date as evluation  Discharge Destination: Home    NICA Guerrero/JORDAN  7/23/2018

## 2018-07-23 NOTE — PLAN OF CARE
Problem: Patient Care Overview  Goal: Plan of Care Review  Outcome: Ongoing (interventions implemented as appropriate)    Goal: Individualization and Mutuality  Outcome: Ongoing (interventions implemented as appropriate)    Goal: Discharge Needs Assessment  Outcome: Ongoing (interventions implemented as appropriate)    Goal: Interprofessional Rounds/Family Conf  Outcome: Ongoing (interventions implemented as appropriate)      Problem: Cardiac: ACS (Acute Coronary Syndrome) (Adult)  Goal: Signs and Symptoms of Listed Potential Problems Will be Absent, Minimized or Managed (Cardiac: ACS)  Outcome: Ongoing (interventions implemented as appropriate)      Problem: Pain, Acute (Adult)  Goal: Identify Related Risk Factors and Signs and Symptoms  Outcome: Ongoing (interventions implemented as appropriate)    Goal: Acceptable Pain Control/Comfort Level  Outcome: Ongoing (interventions implemented as appropriate)

## 2018-07-23 NOTE — DISCHARGE SUMMARY
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, with long-term current use of insulin (CMS/[facility])    Overview Addendum 11/28/2017  8:04 AM by True Sharma MD     Continue home meds  Watch for hypoglcemia   - glucose levels have been normal         Depression, major, recurrent, moderate (CMS/[facility])    Overview Signed 8/27/2017 10:36 PM by Ethel Cooley MD     Continue Effexor and Abilify         GERD without esophagitis    Overview Signed 8/27/2017 10:34 PM by Ethel Cooley MD     Continue protonix         Mixed hyperlipidemia    Overview Signed 8/27/2017 10:33 PM by Ethel Cooley MD     Continue Statin          Vitamin D deficiency    (HFpEF) heart failure with preserved ejection fraction (CMS/[facility])    Overview Deleted 5/1/2018  1:24 PM by BEBE Daniel            Diabetic peripheral neuropathy associated with type 2 diabetes mellitus (CMS/[facility])    Overview Addendum 11/27/2017 11:21 PM by Court Pablo MD     Continue Gabapentin 800MG TID.         B12 deficiency    Class 3 obesity due to excess calories without serious comorbidity with body mass index (BMI) of 40.0 to 44.9 in adult (CMS/[facility])    Chest pain          Consults:   Consults     Date and Time Order Name Status Description    7/21/2018 1805 Hospitalist (on-call MD unless specified)            Procedures Performed:                 Pertinent Test Results:    Laboratory Data:     Results from last 7 days  Lab Units 07/23/18  0721 07/22/18  1031 07/21/18  1644   SODIUM mmol/L 141 139 140   POTASSIUM mmol/L 3.5 3.7 3.5   CHLORIDE mmol/L 107 106 102   CO2 mmol/L  29.0 27.0 31.0   BUN mg/dL 9 11 13   CREATININE mg/dL 0.81 0.71 0.79   GLUCOSE mg/dL 136* 145* 128*   CALCIUM mg/dL 8.1* 8.5 9.1   BILIRUBIN mg/dL 0.2 0.4 0.3   ALK PHOS U/L 88 98 108   ALT (SGPT) U/L 32 33 32   AST (SGOT) U/L 24 25 24   ANION GAP mmol/L 5.0 6.0 7.0     Estimated Creatinine Clearance: 107.1 mL/min (by C-G formula based on SCr of 0.81 mg/dL).            Results from last 7 days  Lab Units 07/23/18  0721 07/22/18  0158 07/21/18  1644   WBC 10*3/mm3 10.00* 11.56* 12.16*   HEMOGLOBIN g/dL 11.1* 12.3 12.9   HEMATOCRIT % 34.3* 38.7 38.7   PLATELETS 10*3/mm3 372 389 418       Results from last 7 days  Lab Units 07/21/18  1644   INR  1.02       Culture Data:   Blood Culture   Date Value Ref Range Status   07/22/2018 No growth at less than 24 hours  Preliminary   07/22/2018 No growth at less than 24 hours  Preliminary     No results found for: URINECX  No results found for: RESPCX  No results found for: WOUNDCX  No results found for: STOOLCX  No components found for: BODYFLD    Microbiology  Blood Culture   Date Value Ref Range Status   07/22/2018 No growth at less than 24 hours  Preliminary                            Radiology Data:   Imaging Results (all)     Procedure Component Value Units Date/Time    US Venous Doppler Lower Extremity Bilateral (duplex) [[id number]] Collected:  07/21/18 2015     Updated:  07/22/18 1027    Narrative:       .  EXAMINATION:  Ultrasound, venous, lower extremity    CLINICAL INDICATION / HISTORY:   swelling, R07.9 Chest pain,  unspecified E53.8 Deficiency of other specified B group vitamins     COMPARISON:  none  TECHNIQUE:  Bilateral lower extremity(ies)                          serial axial graded compression technique                          grayscale, color flow, spectral and  Doppler     FINDINGS:    Imaged vessels:      - common femoral vein (CFV)      - deep femoral vein (FV) (formally called superficial femoral  vein (SFV))      - profunda femoral vein (PFV) (cephalad  portion)   - popliteal vein (PV)    - posterior tibial vein (PTV)       - greater saphenous vein (GSV) (non-contiguous segments)        Unless otherwise indicated, all of the above described vessels  were freely compressible and demonstrated spontaneous and phasic  flow as well as appropriate flow with augmentation.       .      Impression:       CONCLUSION:    1. Negative examination - no evidence of deep venous thrombosis  within the femoral-popliteal system of the bilateral lower  extremity(ies).                  Electronically signed by:  ASHLEY Mejia MD  7/21/2018 8:43  PM CDT Workstation: [id number]    CT Angiogram Chest With Contrast [[id number]] Collected:  07/21/18 1959     Updated:  07/21/18 2047    Narrative:         EXAM:  Computed Tomography with CTA         REGION:  Chest       INDICATION:  SOB ;  Chest pain, acute, PE suspected, intermed  prob, negative D-dimer, R07.9 Chest pain, unspecified E53.8  Deficiency of other specified B group vitamins    - rule out pulmonary embolism       CLINICAL HISTORY:  CORRELATIVE IMAGING:  None                         TECHNIQUE:     - PE / vascular protocol     - reconstructions:  axial, coronal, sagittal, obliques     - computer-generated 3D reconstructions (MIPS) were performed.     - contrast:  intravenous ;  Isovue 370,     75 mL                                This exam was performed according to the departmental  dose-optimization program which includes automated exposure  control, adjustment of the mA and/or kV according to patient size  and/or use of iterative reconstruction technique.         COMMENTS:    - Pulmonary arterial system:      - Main pulmonary artery trunk:  negative      - Left, right main pulmonary arteries: negative      - Lobar arteries: negative       - Segmental arteries: negative      - Systemic vascularity (as visualized):        - Aorta:  grossly negative / normal caliber / no dissection        - roots of great vessels:  grossly negative  / normal  caliber        - SVC / IVC:  grossly negative / normal caliber     - Misc (limited visualization):      - pulmonary parenchyma:  negative      - pleura:  negative      - mediastinal / maria guadalupe:  negative      - neck, inferior:  grossly wnl      - subdiaphragmatic structures:  grossly negative (limited  evaluation)       - osseous:      - misc:     .        Impression:       CONCLUSION:          1.  No evidence of pulmonary embolism.            2.  No evidence of pathology associated with the visualized  aorta.                                                                  Electronically signed by:  ASHLEY Mejia MD  7/21/2018 8:46  PM CDT Workstation: [id number]    XR Chest 2 View [[id number]] Collected:  07/21/18 1422     Updated:  07/21/18 1443    Narrative:         [facility] [place]    Patient Name: BEBA BAILEYA    ATTENDING: PIERRE FRANKLIN     REFERRING: HOMER LEE    ORDERING: HOMER LEE    -----------------------    PROCEDURE: Chest, PA    Date of exam: 7/21/2018    HISTORY: Chest pain    A single PA view of the chest was obtained. Study is compared to  prior exam of 4/30/2018.    The lungs are satisfactorily expanded and clear of infiltrates or  effusions. Chronic elevation right hemidiaphragm is unchanged  from prior exam. Sternal wires are seen from previous median  sternotomy. The heart size is normal and the vasculature does not  appear congested.The costophrenic angles are clear.       Impression:       No active disease. No change from prior exam.      Electronically signed by:  Obie Thomas MD  7/21/2018 2:42 PM  CDT Workstation: [id number]          Chief Complaint on Day of Discharge: none    HPI:Patient is 58 y.o. female presents with PMH CAD s/p CABG, HTN, T2DM, obesity, GERD, HFpEF, depression, is presented with complain of having retrosternal chest pain, started yesterday, pressure like in nature, radiating to her back, 7/10 this morning and now  6/10, worse with exertion. She is also complaining of having nausea as well. Pt states that she had a stress test about 2 months ago which was negative.  Hospital Course: patient had negative stress 2 months , symptoms resolved , work up negative , decreased neurotin from 800mg to 400mg TID. leucktosis likely reactional with no L shift , cultures so far no growth , leukotcyosis resovled.  asymptomatic and exam unrerkable at time of dc. PCP and cardiology as outpatient .    Condition on Discharge:  Improved and Stable    Physical Exam on Discharge:  Temp:  [97.6 °F (36.4 °C)-98.7 °F (37.1 °C)] 97.6 °F (36.4 °C)  Heart Rate:  [74-98] 75  Resp:  [18] 18  BP: (121-134)/(57-63) 134/63    Constitutional: Patient is AAO x 3. Patient appears well-developed and well-nourished.   Cardiovascular: Normal rate, regular rhythm, normal heart sounds and intact distal pulses.  Exam reveals no gallop and no friction rub.  No murmur heard.  Pulmonary/Chest: Effort normal and breath sounds normal. No respiratory distress. No wheezes, rales or rhonchi.  Abdominal: Soft. Bowel sounds are normal. No distension, no tenderness. There is no rebound and no guarding. No hernia.   Musculoskeletal: No cyanosis, clubbing or edema.    Discharge Disposition:  Home or Self Care    Discharge Medications:     Discharge Medications      New Medications      Instructions Start Date   amoxicillin-clavulanate 875-125 MG per tablet  Commonly known as:  AUGMENTIN   1 tablet, Oral, Every 12 Hours Scheduled         Changes to Medications      Instructions Start Date   gabapentin 800 MG tablet  Commonly known as:  NEURONTIN  What changed:  how much to take   400 mg, Oral, 3 Times Daily      metoclopramide 10 MG tablet  Commonly known as:  REGLAN  What changed:  · when to take this  · reasons to take this   10 mg, Oral, 4 Times Daily Before Meals & Nightly      ondansetron 8 MG tablet  Commonly known as:  ZOFRAN  What changed:  · when to take this  · reasons to  take this   8 mg, Oral, Every 8 Hours         Continue These Medications      Instructions Start Date   ARIPiprazole 15 MG tablet  Commonly known as:  ABILIFY   TAKE 1 TABLET BY MOUTH DAILY.      aspirin 81 MG chewable tablet   81 mg, Oral, Daily      atorvastatin 40 MG tablet  Commonly known as:  LIPITOR   40 mg, Oral, Nightly      brompheniramine-pseudoephedrine-DM 30-2-10 MG/5ML syrup   5 mL, Oral, 4 Times Daily PRN      CITRACAL/VITAMIN D 250-200 MG-UNIT tablet  Generic drug:  Calcium Citrate-Vitamin D   2 tablets, Oral, 2 Times Daily      clopidogrel 75 MG tablet  Commonly known as:  PLAVIX   TAKE 1 TABLET BY MOUTH DAILY.      furosemide 40 MG tablet  Commonly known as:  LASIX   40 mg, Oral, Daily      GLUCAGON EMERGENCY 1 MG injection  Generic drug:  glucagon   USE AS DIRECTED AS NEEDED      glucose blood test strip  Commonly known as:  ONE TOUCH ULTRA TEST   1 each, Other, 4 Times Daily, Use as instructed      HYDROcodone-acetaminophen 7.5-325 MG per tablet  Commonly known as:  NORCO   1 tablet, Oral, Every 4 Hours PRN      insulin aspart 100 UNIT/ML solution pen-injector sc pen  Commonly known as:  NOVOLOG FLEXPEN   60 units qac tid      Insulin Glargine 100 UNIT/ML injection pen  Commonly known as:  BASAGLAR KWIKPEN   100 units q hs      Insulin Pen Needle 31G X 8 MM misc  Commonly known as:  B-D ULTRAFINE III SHORT PEN   1 each, Intramuscular, 4 Times Daily, use as directed      LORazepam 1 MG tablet  Commonly known as:  ATIVAN   1 mg, Oral, Daily PRN      meclizine 25 MG tablet  Commonly known as:  ANTIVERT   25 mg, Oral, 3 Times Daily PRN      metoprolol succinate XL 25 MG 24 hr tablet  Commonly known as:  TOPROL-XL   25 mg, Oral, Daily      mirtazapine 45 MG tablet  Commonly known as:  REMERON   45 mg, Oral, Nightly      ONE TOUCH ULTRA MINI w/Device kit   USE AS DIRECTED TO CHECK BLOOD SUGAR      pantoprazole 40 MG EC tablet  Commonly known as:  PROTONIX   TAKE 1 TABLET BY MOUTH DAILY.      venlafaxine   MG 24 hr capsule  Commonly known as:  EFFEXOR-XR   TAKE ONE CAPSULE BY MOUTH TWICE A DAY FOR DEPRESSION      vitamin D 95825 units capsule capsule  Commonly known as:  ERGOCALCIFEROL   TAKE ONE CAPSULE BY MOUTH EVERY SUNDAY             Discharge Diet:   Diet Instructions     Diet: Consistent Carbohydrate, Cardiac, Renal       Discharge Diet:   Consistent Carbohydrate  Cardiac  Renal       Fluid Restriction per day:  1500 mL Fluid          Activity at Discharge:   Activity Instructions     Activity as Tolerated             All the abnormal findings were discussed with the patient in detail and the patient verbalized understanding of needing to follow up with PCP and other specialties.  Outpatient follow up for further evaluation and management.    Discharge Care Plan/Instructions: follow up with PCP in 2-3 days.    Follow-up Appointments:   Future Appointments  Date Time Provider Department Center   7/27/2018 10:30 AM [facility]BE [facility] [place] [facility] None   8/2/2018 8:15 AM Francisca Fong MD [facility] POW None   8/21/2018 9:30 AM Jack L Hamman, MD [facility]V MAD None   9/7/2018 8:45 AM Can Kwon MD PhD [facility] CD MAD None       Test Results Pending at Discharge:    Order Current Status    B. Burgdorferi Antibodies, WB Reflex In process    Babesia Microti Antibody Panel In process    Ehrlichia Antibody Panel In process    [facility] (IgG / M) In process    Blood Culture - Blood, Preliminary result    Blood Culture - Blood, Preliminary result           This document has been electronically signed by Warren Castrejon MD on July 23, 2018 11:00 AM

## 2018-07-23 NOTE — PLAN OF CARE
Problem: Patient Care Overview  Goal: Plan of Care Review  Outcome: Unable to achieve outcome(s) by discharge Date Met: 07/23/18 07/23/18 0831   Coping/Psychosocial   Plan of Care Reviewed With patient   OTHER   Outcome Summary OT eval completed this date. Pt mod I with bed mobility, close SBA to CGA for sit to stand off bed, CGA for sit to stand of the toilet, CGA for mobility around room. pt with set up mod I with don socks. Pt could have benefited from further skilled OT to increase endurance, strength, safety and indepence with ADL, However, pt d/c from hospital after OT eval home. Pt educateion started on home safety with ADL.

## 2018-07-24 LAB
B BURGDOR IGG+IGM SER-ACNC: <0.91 ISR (ref 0–0.9)
B BURGDOR IGM SER-ACNC: <0.8 INDEX (ref 0–0.79)

## 2018-07-25 LAB
B MICROTI IGG TITR SER: NORMAL {TITER}
B MICROTI IGM TITR SER: NORMAL {TITER}

## 2018-07-26 LAB
A PHAGOCYTOPH IGM TITR SER IF: NEGATIVE {TITER}
CONV HGE IGG TITER: NEGATIVE
E CHAFFEENSIS IGG TITR SER IF: NEGATIVE {TITER}
E. CHAFFEENSIS (HME) IGM TITER: NEGATIVE
R RICKETTSI IGG SER QL IA: NEGATIVE
R RICKETTSI IGM TITR SER: 0.13 INDEX (ref 0–0.89)

## 2018-07-27 ENCOUNTER — OFFICE VISIT (OUTPATIENT)
Dept: ENDOCRINOLOGY | Facility: CLINIC | Age: 58
End: 2018-07-27

## 2018-07-27 VITALS
DIASTOLIC BLOOD PRESSURE: 88 MMHG | BODY MASS INDEX: 41.07 KG/M2 | HEART RATE: 90 BPM | SYSTOLIC BLOOD PRESSURE: 126 MMHG | HEIGHT: 68 IN | WEIGHT: 271 LBS

## 2018-07-27 DIAGNOSIS — E11.42 DIABETIC PERIPHERAL NEUROPATHY ASSOCIATED WITH TYPE 2 DIABETES MELLITUS (HCC): ICD-10-CM

## 2018-07-27 DIAGNOSIS — E78.2 MIXED HYPERLIPIDEMIA: ICD-10-CM

## 2018-07-27 DIAGNOSIS — IMO0002 UNCONTROLLED TYPE 2 DIABETES MELLITUS WITH DIABETIC POLYNEUROPATHY, WITH LONG-TERM CURRENT USE OF INSULIN: Primary | ICD-10-CM

## 2018-07-27 DIAGNOSIS — E55.9 VITAMIN D DEFICIENCY: ICD-10-CM

## 2018-07-27 LAB
BACTERIA SPEC AEROBE CULT: NORMAL
BACTERIA SPEC AEROBE CULT: NORMAL
HBA1C MFR BLD: 6.4 %

## 2018-07-27 PROCEDURE — 99214 OFFICE O/P EST MOD 30 MIN: CPT | Performed by: NURSE PRACTITIONER

## 2018-07-27 PROCEDURE — 83036 HEMOGLOBIN GLYCOSYLATED A1C: CPT | Performed by: NURSE PRACTITIONER

## 2018-07-27 NOTE — PROGRESS NOTES
Subjective    Ariana Martinez is a 58 y.o. female. she is here today for follow-up.    History of Present Illness     Duration/Timing: Diabetes mellitus type 2, Age at onset of diabetes: 26 years years, Onset of symptoms gradual  timing - constant     qualtiy - uncontrolled     severity - moderate        -----------------------     Severity (Complications/Hospitalizations)  Secondary Macrovascular Complications: No CAD, No CVA, No PAD  Secondary Microvascular Complications: No Diabetic Nephropathy, No Diabetic Retinopathy, Diabetic Neuropathy     Context  Diabetes Regimen: Insulin, Oral Medications        Lab Results   Component Value Date    HGBA1C 6.4 07/27/2018                   Blood Glucose Readings     Now on dexcom sensor      High am -- missing tresiba           Diet  variable carb intake  Exercise: Exercises  walking     Associated Signs/Symptoms  Hyperglycemic Symptoms: No polyuria, No polydipsia, No polyphagia, No weight gain  Hypoglycemic Episodes: Documented symptomatic hypoglycemia, Seizures and syncope related to hypoglycemia               The following portions of the patient's history were reviewed and updated as appropriate:   Past Medical History:   Diagnosis Date   • Acquired hypothyroidism    • Angina, class IV (CMS/HCC)    • Anxiety    • Benign paroxysmal positional vertigo    • Carpal tunnel syndrome    • Chronic pain    • Coronary atherosclerosis    • Depression    • Diabetes mellitus (CMS/HCC)     Type 2, controlled   • Diabetic polyneuropathy (CMS/HCC)    • Female stress incontinence    • Gastroparesis    • Hashimoto's thyroiditis    • Hyperlipidemia    • Hypertension    • Low back pain    • Malaise and fatigue    • Multiple joint pain    • Obesity     Refuses to be weighed   • Otalgia     Both   • Perforation of tympanic membrane     Left   • Postoperative wound infection    • Vitamin D deficiency      Past Surgical History:   Procedure Laterality Date   • ABDOMINAL SURGERY     •  ANGIOPLASTY      coronary   • BREAST BIOPSY Right    • CARDIAC CATHETERIZATION     • CARDIAC CATHETERIZATION N/A 2017    Procedure: Right Heart Cath;  Surgeon: Can Kwon MD PhD;  Location: [facility] INVASIVE LOCATION;  Service:    • CARPAL TUNNEL RELEASE     • CHOLECYSTECTOMY     • CORONARY ARTERY BYPASS GRAFT     • ENDOSCOPY AND COLONOSCOPY     • FOOT SURGERY      Toes   • GASTRIC BANDING      Revision, laparoscopic   • HYSTERECTOMY     • MOUTH SURGERY     • SALPINGO OOPHORECTOMY     • SHOULDER SURGERY     • TRANSESOPHAGEAL ECHOCARDIOGRAM (LAMONTE)      With color flow     Family History   Problem Relation Age of Onset   • Diabetes Other    • Heart disease Other    • Hypertension Other    • Heart disease Mother    • Stroke Mother    • Hypertension Mother    • Diabetes Sister    • Heart disease Sister    • Hypertension Sister    • Heart disease Sister    • Diabetes Sister    • Hypertension Sister    • Diabetes Sister    • Diabetes Sister    • Diabetes Sister    • Diabetes Sister      OB History      Para Term  AB Living    0 0 0 0 0 0    SAB TAB Ectopic Molar Multiple Live Births    0 0 0   0          Current Outpatient Prescriptions   Medication Sig Dispense Refill   • ARIPiprazole (ABILIFY) 15 MG tablet TAKE 1 TABLET BY MOUTH DAILY. 30 tablet 5   • aspirin 81 MG chewable tablet Chew 81 mg daily.     • atorvastatin (LIPITOR) 40 MG tablet TAKE 1 TABLET BY MOUTH EVERY NIGHT. 90 tablet 1   • Blood Glucose Monitoring Suppl (ONE TOUCH ULTRA MINI) w/Device kit USE AS DIRECTED TO CHECK BLOOD SUGAR 1 each 0   • brompheniramine-pseudoephedrine-DM 30-2-10 MG/5ML syrup Take 5 mL by mouth 4 (Four) Times a Day As Needed for Congestion, Cough or Allergies. 120 mL 0   • Calcium Citrate-Vitamin D (CITRACAL/VITAMIN D) 250-200 MG-UNIT tablet Take 2 tablets by mouth 2 (two) times a day.     • clopidogrel (PLAVIX) 75 MG tablet TAKE 1 TABLET BY MOUTH DAILY. 90 tablet 3   • furosemide (LASIX) 40 MG  tablet Take 1 tablet by mouth Daily. 90 tablet 3   • gabapentin (NEURONTIN) 800 MG tablet Take 0.5 tablets by mouth 3 (Three) Times a Day. 90 tablet 5   • GLUCAGON EMERGENCY 1 MG injection USE AS DIRECTED AS NEEDED 1 kit 0   • glucose blood (ONE TOUCH ULTRA TEST) test strip 1 each by Other route 4 (Four) Times a Day. Use as instructed 400 each 1   • HYDROcodone-acetaminophen (NORCO) 7.5-325 MG per tablet Take 1 tablet by mouth Every 4 (Four) Hours As Needed for Moderate Pain . 180 tablet 0   • insulin aspart (NOVOLOG FLEXPEN) 100 UNIT/ML solution pen-injector sc pen 60 units qac tid 18 pen 11   • Insulin Glargine (BASAGLAR KWIKPEN) 100 UNIT/ML injection pen 100 units q hs 5 pen 5   • Insulin Pen Needle (B-D ULTRAFINE III SHORT PEN) 31G X 8 MM misc Inject 1 each into the shoulder, thigh, or buttocks 4 (Four) Times a Day. use as directed 150 each 11   • LORazepam (ATIVAN) 1 MG tablet Take 1 tablet by mouth Daily As Needed for Anxiety. 30 tablet 0   • meclizine (ANTIVERT) 25 MG tablet Take 1 tablet by mouth 3 (Three) Times a Day As Needed for dizziness. 90 tablet 6   • metoclopramide (REGLAN) 10 MG tablet Take 1 tablet by mouth 4 (Four) Times a Day Before Meals & at Bedtime. (Patient taking differently: Take 10 mg by mouth 4 (Four) Times a Day As Needed.) 30 tablet 0   • metoprolol succinate XL (TOPROL-XL) 25 MG 24 hr tablet TAKE 1 TABLET BY MOUTH DAILY. 31 tablet 6   • mirtazapine (REMERON) 45 MG tablet Take 1 tablet by mouth Every Night. 90 tablet 1   • ondansetron (ZOFRAN) 8 MG tablet Take 1 tablet by mouth Every 8 (Eight) Hours. (Patient taking differently: Take 8 mg by mouth Every 8 (Eight) Hours As Needed.) 90 tablet 3   • ONE TOUCH ULTRA TEST test strip USE TO TEST SUGAR 4 TIMES A  each 0   • pantoprazole (PROTONIX) 40 MG EC tablet TAKE 1 TABLET BY MOUTH DAILY. 90 tablet 2   • venlafaxine XR (EFFEXOR-XR) 150 MG 24 hr capsule TAKE ONE CAPSULE BY MOUTH TWICE A DAY FOR DEPRESSION 180 capsule 3   • vitamin D  (ERGOCALCIFEROL) 34392 units capsule capsule TAKE ONE CAPSULE BY MOUTH EVERY SUNDAY 12 capsule 2     Current Facility-Administered Medications   Medication Dose Route Frequency Provider Last Rate Last Dose   • cyanocobalamin injection 1,000 mcg  1,000 mcg Intramuscular Q28 Days Francisca Fong MD   1,000 mcg at 07/09/18 1128     Allergies   Allergen Reactions   • Seroquel [Quetiapine Fumarate] Anaphylaxis   • Avandia [Rosiglitazone] Swelling   • Morphine And Related Hallucinations     If patient takes too much     Social History     Social History   • Marital status:      Social History Main Topics   • Smoking status: Never Smoker   • Smokeless tobacco: Never Used   • Alcohol use No   • Drug use: No   • Sexual activity: Defer      Comment:      Other Topics Concern   • Not on file       Review of Systems  Review of Systems   Constitutional: Negative for activity change, appetite change, diaphoresis and fatigue.   HENT: Negative for facial swelling, sneezing, sore throat, tinnitus, trouble swallowing and voice change.    Eyes: Negative for photophobia, pain, discharge, redness, itching and visual disturbance.   Respiratory: Negative for apnea, cough, choking, chest tightness and shortness of breath.    Cardiovascular: Negative for chest pain, palpitations and leg swelling.   Gastrointestinal: Negative for abdominal distention, abdominal pain, constipation, diarrhea, nausea and vomiting.   Endocrine: Negative for cold intolerance, heat intolerance, polydipsia, polyphagia and polyuria.   Genitourinary: Negative for difficulty urinating, dysuria, frequency, hematuria and urgency.   Musculoskeletal: Negative for arthralgias, back pain, gait problem, joint swelling, myalgias, neck pain and neck stiffness.   Skin: Negative for color change, pallor, rash and wound.   Neurological: Negative for dizziness, tremors, weakness, light-headedness, numbness and headaches.   Hematological: Negative for adenopathy.  "Does not bruise/bleed easily.   Psychiatric/Behavioral: Negative for behavioral problems, confusion and sleep disturbance.        Objective    /88 (BP Location: Right arm, Patient Position: Sitting, Cuff Size: Adult)   Pulse 90   Ht 172.7 cm (68\")   Wt 123 kg (271 lb)   BMI 41.21 kg/m²   Physical Exam   Constitutional: She is oriented to person, place, and time. She appears well-developed and well-nourished. No distress.   HENT:   Head: Normocephalic and atraumatic.   Right Ear: External ear normal.   Left Ear: External ear normal.   Nose: Nose normal.   Eyes: Pupils are equal, round, and reactive to light. Conjunctivae and EOM are normal.   Neck: Normal range of motion. Neck supple. No tracheal deviation present. No thyromegaly present.   Cardiovascular: Normal rate, regular rhythm and normal heart sounds.    No murmur heard.  Pulmonary/Chest: Effort normal and breath sounds normal. No respiratory distress. She has no wheezes.   Abdominal: Soft. Bowel sounds are normal. There is no tenderness. There is no rebound and no guarding.   Musculoskeletal: Normal range of motion. She exhibits no edema, tenderness or deformity.   Neurological: She is alert and oriented to person, place, and time. No cranial nerve deficit.   Skin: Skin is warm and dry. No rash noted.   Psychiatric: She has a normal mood and affect. Her behavior is normal. Judgment and thought content normal.       Lab Review  Glucose (mg/dL)   Date Value   07/23/2018 136 (H)   07/22/2018 145 (H)   07/21/2018 128 (H)     Glucose, Arterial (mmol/L)   Date Value   10/14/2017 155     Sodium (mmol/L)   Date Value   07/23/2018 141   07/22/2018 139   07/21/2018 140     Potassium (mmol/L)   Date Value   07/23/2018 3.5   07/22/2018 3.7   07/21/2018 3.5     Chloride (mmol/L)   Date Value   07/23/2018 107   07/22/2018 106   07/21/2018 102     CO2 (mmol/L)   Date Value   07/23/2018 29.0   07/22/2018 27.0   07/21/2018 31.0     BUN (mg/dL)   Date Value "   2018 9   2018 11   2018 13     Creatinine (mg/dL)   Date Value   2018 0.81   2018 0.71   2018 0.79     Hemoglobin A1C (%)   Date Value   2018 6.4   2018 7.8 (H)   2018 8.2 (H)   10/19/2017 7.4 (H)     Triglycerides (mg/dL)   Date Value   2018 223 (H)   2018 344 (H)   2017 389 (H)     LDL Cholesterol  (mg/dL)   Date Value   2018 108   2018 130 (H)   2017 119       Assessment/Plan      1. Uncontrolled type 2 diabetes mellitus with diabetic polyneuropathy, with long-term current use of insulin (CMS/[facility])    2. Diabetic peripheral neuropathy associated with type 2 diabetes mellitus (CMS/[facility])    3. Vitamin D deficiency    4. Mixed hyperlipidemia    .    Medications prescribed:  Outpatient Encounter Prescriptions as of 2018   Medication Sig Dispense Refill   • [] amoxicillin-clavulanate (AUGMENTIN) 875-125 MG per tablet Take 1 tablet by mouth Every 12 (Twelve) Hours for 3 days. 6 tablet 0   • ARIPiprazole (ABILIFY) 15 MG tablet TAKE 1 TABLET BY MOUTH DAILY. 30 tablet 5   • aspirin 81 MG chewable tablet Chew 81 mg daily.     • atorvastatin (LIPITOR) 40 MG tablet TAKE 1 TABLET BY MOUTH EVERY NIGHT. 90 tablet 1   • Blood Glucose Monitoring Suppl (ONE TOUCH ULTRA MINI) w/Device kit USE AS DIRECTED TO CHECK BLOOD SUGAR 1 each 0   • brompheniramine-pseudoephedrine-DM 30-2-10 MG/5ML syrup Take 5 mL by mouth 4 (Four) Times a Day As Needed for Congestion, Cough or Allergies. 120 mL 0   • Calcium Citrate-Vitamin D (CITRACAL/VITAMIN D) 250-200 MG-UNIT tablet Take 2 tablets by mouth 2 (two) times a day.     • clopidogrel (PLAVIX) 75 MG tablet TAKE 1 TABLET BY MOUTH DAILY. 90 tablet 3   • furosemide (LASIX) 40 MG tablet Take 1 tablet by mouth Daily. 90 tablet 3   • gabapentin (NEURONTIN) 800 MG tablet Take 0.5 tablets by mouth 3 (Three) Times a Day. 90 tablet 5   • GLUCAGON EMERGENCY 1 MG injection USE AS DIRECTED AS NEEDED 1 kit 0    • glucose blood (ONE TOUCH ULTRA TEST) test strip 1 each by Other route 4 (Four) Times a Day. Use as instructed 400 each 1   • HYDROcodone-acetaminophen (NORCO) 7.5-325 MG per tablet Take 1 tablet by mouth Every 4 (Four) Hours As Needed for Moderate Pain . 180 tablet 0   • insulin aspart (NOVOLOG FLEXPEN) 100 UNIT/ML solution pen-injector sc pen 60 units qac tid 18 pen 11   • Insulin Glargine (BASAGLAR KWIKPEN) 100 UNIT/ML injection pen 100 units q hs 5 pen 5   • Insulin Pen Needle (B-D ULTRAFINE III SHORT PEN) 31G X 8 MM misc Inject 1 each into the shoulder, thigh, or buttocks 4 (Four) Times a Day. use as directed 150 each 11   • LORazepam (ATIVAN) 1 MG tablet Take 1 tablet by mouth Daily As Needed for Anxiety. 30 tablet 0   • meclizine (ANTIVERT) 25 MG tablet Take 1 tablet by mouth 3 (Three) Times a Day As Needed for dizziness. 90 tablet 6   • metoclopramide (REGLAN) 10 MG tablet Take 1 tablet by mouth 4 (Four) Times a Day Before Meals & at Bedtime. (Patient taking differently: Take 10 mg by mouth 4 (Four) Times a Day As Needed.) 30 tablet 0   • metoprolol succinate XL (TOPROL-XL) 25 MG 24 hr tablet TAKE 1 TABLET BY MOUTH DAILY. 31 tablet 6   • mirtazapine (REMERON) 45 MG tablet Take 1 tablet by mouth Every Night. 90 tablet 1   • ondansetron (ZOFRAN) 8 MG tablet Take 1 tablet by mouth Every 8 (Eight) Hours. (Patient taking differently: Take 8 mg by mouth Every 8 (Eight) Hours As Needed.) 90 tablet 3   • ONE TOUCH ULTRA TEST test strip USE TO TEST SUGAR 4 TIMES A  each 0   • pantoprazole (PROTONIX) 40 MG EC tablet TAKE 1 TABLET BY MOUTH DAILY. 90 tablet 2   • venlafaxine XR (EFFEXOR-XR) 150 MG 24 hr capsule TAKE ONE CAPSULE BY MOUTH TWICE A DAY FOR DEPRESSION 180 capsule 3   • vitamin D (ERGOCALCIFEROL) 93300 units capsule capsule TAKE ONE CAPSULE BY MOUTH EVERY SUNDAY 12 capsule 2     Facility-Administered Encounter Medications as of 7/27/2018   Medication Dose Route Frequency Provider Last Rate Last Dose    • cyanocobalamin injection 1,000 mcg  1,000 mcg Intramuscular Q28 Days Francisca Fong MD   1,000 mcg at 07/09/18 1128       Orders placed during this encounter include:  Orders Placed This Encounter   Procedures   • TSH   • Comprehensive Metabolic Panel   • Hemoglobin A1c   • POC Glycosylated Hemoglobin (Hb A1C)   • CBC & Differential     Order Specific Question:   Manual Differential     Answer:   No     Glycemic Management      Lab Results   Component Value Date    HGBA1C 6.4 07/27/2018                   dexcom sensor wearing            Tresiba -- taking 120 units s  -- change to Toujeo ---  Now on Basaglar taking 100 units              ==================        Novolog      Taking 60 units -- taking 40 units      Discussed carb counting but states cannot     She will need to look at her meals because 30 units may not be enough for some meals and for others meals to strong     If you eat a meal and give 30 units and your sugar is 200 or higher before the next meal look back at that meal and the next time you eat it either give more insulin or eat less     Also if you have a low after the meal give less insulin the next time you eat that meal                  ==================     Jardiance 10 mg tablet , take before breakfast---will stop due to dizziness for possible volume depletion-----the dizziness has resolved since stopping jardiance        ==================     Metformin 500 mg tablets - caused diarrhea --stopped      ====================     start bydureon - stopped     Wants to try victoza -- start 0.6 mg one week  Warned of the gastric side effects she does have gastroparesis but she wants to try      Gave a sample she will let me know if she wants an RX called in      If vomiting or abdominal pain stop                 januvia 100 mg daily -- stopped --      ------------------------------     Lipid Management        on Lipitor   on fenofibrate               LDL needs to be 70 or less     add  benny     Also discussed restarting the jardiance for the heart benefits but refuses     Component      Latest Ref Rng & Units 4/16/2018   Total Cholesterol      0 - 199 mg/dL 197   Triglycerides      20 - 199 mg/dL 223 (H)   HDL Cholesterol      60 - 200 mg/dL 33 (L)   LDL Cholesterol       1 - 129 mg/dL 108   LDL/HDL Ratio      0.00 - 3.22 3.62 (H)               Blood Pressure Management: Control of blood pressure  on ACEi     stopped the lasix       Microvascular Complication Monitoring: Neuropathy type sensorial    Neurontin twice a day      intermittetly takes reglan for gastroparesis     states eye exam ws 2015  RX for shoes - diabetic neuropathy      Immunization - last influenza vaccine Oct. 2017         Other Diabetes Related Aspects     TSH abnormal from July to Sept 2014     TSH in the 5 to 7 range               she refuses thyroid medicaton                  Lab Results   Component Value Date     TSH 4.650 04/16/2018         Lab Results   Component Value Date    TSH 3.950 07/21/2018       ---     3-15     B12 low      now b12 shots     June 2015     B12- 968     Vitamin d def        Vitamin d - 26      Continue vitamin d supplement      April 2018     Vitamin d -28         Referral to GI      Reason - diarrhea chronic      History of gastroparesis                4. Follow-up: Return in about 4 months (around 11/27/2018) for Recheck.

## 2018-07-31 RX ORDER — MIRTAZAPINE 45 MG/1
45 TABLET, FILM COATED ORAL NIGHTLY
Qty: 90 TABLET | Refills: 1 | Status: SHIPPED | OUTPATIENT
Start: 2018-07-31 | End: 2019-01-23 | Stop reason: SDUPTHER

## 2018-08-02 ENCOUNTER — OFFICE VISIT (OUTPATIENT)
Dept: FAMILY MEDICINE CLINIC | Facility: CLINIC | Age: 58
End: 2018-08-02

## 2018-08-02 VITALS
HEIGHT: 68 IN | OXYGEN SATURATION: 98 % | BODY MASS INDEX: 40.32 KG/M2 | DIASTOLIC BLOOD PRESSURE: 76 MMHG | HEART RATE: 88 BPM | SYSTOLIC BLOOD PRESSURE: 132 MMHG | WEIGHT: 266 LBS

## 2018-08-02 DIAGNOSIS — F41.1 GENERALIZED ANXIETY DISORDER: ICD-10-CM

## 2018-08-02 DIAGNOSIS — E11.43 DIABETIC GASTROPARESIS ASSOCIATED WITH TYPE 2 DIABETES MELLITUS (HCC): ICD-10-CM

## 2018-08-02 DIAGNOSIS — M25.50 CHRONIC PAIN OF MULTIPLE JOINTS: ICD-10-CM

## 2018-08-02 DIAGNOSIS — G89.29 CHRONIC PAIN OF MULTIPLE JOINTS: ICD-10-CM

## 2018-08-02 DIAGNOSIS — IMO0001 CLASS 3 OBESITY DUE TO EXCESS CALORIES WITHOUT SERIOUS COMORBIDITY WITH BODY MASS INDEX (BMI) OF 40.0 TO 44.9 IN ADULT: ICD-10-CM

## 2018-08-02 DIAGNOSIS — E53.8 CYANOCOBALAMIN DEFICIENCY: ICD-10-CM

## 2018-08-02 DIAGNOSIS — Z09 HOSPITAL DISCHARGE FOLLOW-UP: Primary | ICD-10-CM

## 2018-08-02 DIAGNOSIS — Z23 NEED FOR SHINGLES VACCINE: ICD-10-CM

## 2018-08-02 DIAGNOSIS — K31.84 DIABETIC GASTROPARESIS ASSOCIATED WITH TYPE 2 DIABETES MELLITUS (HCC): ICD-10-CM

## 2018-08-02 DIAGNOSIS — E11.42 DIABETIC PERIPHERAL NEUROPATHY ASSOCIATED WITH TYPE 2 DIABETES MELLITUS (HCC): ICD-10-CM

## 2018-08-02 PROBLEM — R10.13 CHRONIC EPIGASTRIC PAIN: Status: RESOLVED | Noted: 2017-11-27 | Resolved: 2018-08-02

## 2018-08-02 PROBLEM — R07.9 CHEST PAIN: Status: RESOLVED | Noted: 2018-04-30 | Resolved: 2018-08-02

## 2018-08-02 PROCEDURE — 96372 THER/PROPH/DIAG INJ SC/IM: CPT | Performed by: FAMILY MEDICINE

## 2018-08-02 PROCEDURE — 99214 OFFICE O/P EST MOD 30 MIN: CPT | Performed by: FAMILY MEDICINE

## 2018-08-02 RX ORDER — GABAPENTIN 400 MG/1
400 CAPSULE ORAL 3 TIMES DAILY
Qty: 90 CAPSULE | Refills: 2 | Status: SHIPPED | OUTPATIENT
Start: 2018-08-02 | End: 2018-10-23 | Stop reason: SDUPTHER

## 2018-08-02 RX ORDER — GABAPENTIN 800 MG/1
400 TABLET ORAL 3 TIMES DAILY
Qty: 90 TABLET | Refills: 5 | Status: CANCELLED
Start: 2018-08-02

## 2018-08-02 RX ORDER — HYDROCODONE BITARTRATE AND ACETAMINOPHEN 7.5; 325 MG/1; MG/1
1 TABLET ORAL EVERY 4 HOURS PRN
Qty: 180 TABLET | Refills: 0 | Status: SHIPPED | OUTPATIENT
Start: 2018-08-02 | End: 2018-08-31 | Stop reason: SDUPTHER

## 2018-08-02 RX ORDER — LORAZEPAM 0.5 MG/1
1 TABLET ORAL DAILY PRN
Qty: 30 TABLET | Refills: 0 | Status: SHIPPED | OUTPATIENT
Start: 2018-08-02 | End: 2018-08-31 | Stop reason: SDUPTHER

## 2018-08-02 RX ADMIN — CYANOCOBALAMIN 1000 MCG: 1000 INJECTION, SOLUTION INTRAMUSCULAR; SUBCUTANEOUS at 08:07

## 2018-08-02 NOTE — PROGRESS NOTES
Subjective   Chief Complaint   Patient presents with   • Follow-up     3 month    • Pain     Norco and Gabapentin refill last dose 8/3/18   • B12 Injection     Ariana Martinez is a 58 y.o. female.   Follow-up (3 month ); Pain (Norco and Gabapentin refill last dose 8/3/18); and B12 Injection    History of Present Illness     Presents today with hospital follow up from [facility] 7/21until 7/23  Had complaints of chest pain but ruled out cardiac cause and was diagnosed with an acute episode of gastroparesis.  She was placed on protonix BID x 7 days.  The patient denied any improvement in symptoms.  Denies any complaints today.     Due for medications refills - ativan, norco, neurontin    Generalized anxiety disorder - controlled with ativan PRN  Due for a refill - needs to continue to be tapered    Gastroparesis -  Managed with reglan  Asking for a referral to GI    Obesity - patient has been counseled by numerous providers on the significance of weight loss    Diabetic peripheral neuropathy - confirmed by EMG with Dr Mansfield  Pain managed with gabapentin    Diabetes - managed with insulin  Followed by endocrinology  Average readings of FBG levels are 100-140  Currently prescribed novolog, tresiba  Has previously failed metformin, bydureon, jardiance, januvia    Chronic back and joint pain - controlled with norco    Hypertension - blood pressure currently managed with hctz, lasix, toprol    b12 deficiency - managed with monthly injections    Complaints of edema of upper and lower extremities - managed with lasix  Asking for a dose change to help remove retained fluid  Last cmp - bun/creatinine were stable    The following portions of the patient's history were reviewed and updated as appropriate: allergies, current medications, past family history, past medical history, past social history, past surgical history and problem list.    Review of Systems   Constitutional: Negative for appetite change,  "chills, fatigue and fever.   HENT: Negative for congestion, ear pain, rhinorrhea and sore throat.    Eyes: Negative for pain.   Respiratory: Negative for cough and shortness of breath.    Cardiovascular: Negative for chest pain and palpitations.   Gastrointestinal: Negative for abdominal pain, constipation, diarrhea and nausea.   Genitourinary: Negative for dysuria.   Musculoskeletal: Negative for back pain, joint swelling and neck pain.   Skin: Negative for rash.   Neurological: Negative for dizziness and headaches.       Objective   /76   Pulse 88   Ht 172.7 cm (67.99\")   Wt 121 kg (266 lb)   SpO2 98%   BMI 40.45 kg/m²   Physical Exam   Constitutional: She is oriented to person, place, and time. She appears well-developed and well-nourished.   HENT:   Head: Normocephalic and atraumatic.   Eyes: Pupils are equal, round, and reactive to light.   Neck: Normal range of motion. Neck supple.   Cardiovascular: Normal rate, regular rhythm and normal heart sounds.    Pulmonary/Chest: Effort normal and breath sounds normal. No respiratory distress. She has no wheezes. She has no rales.   Abdominal: Soft. Bowel sounds are normal.   Morbid central obesity   Musculoskeletal: Normal range of motion.   Neurological: She is alert and oriented to person, place, and time.   Skin: Skin is warm and dry.   Psychiatric: She has a normal mood and affect.   Nursing note and vitals reviewed.      Assessment/Plan   Problems Addressed this Visit        Digestive    Cyanocobalamin deficiency       Endocrine    Diabetic peripheral neuropathy associated with type 2 diabetes mellitus (CMS/[facility])    Relevant Medications    gabapentin (NEURONTIN) 400 MG capsule       Other    MAURICIO (generalized anxiety disorder)    Relevant Medications    LORazepam (ATIVAN) 0.5 MG tablet    Chronic pain of multiple joints    Relevant Medications    HYDROcodone-acetaminophen (NORCO) 7.5-325 MG per tablet    Class 3 obesity due to excess calories without " serious comorbidity with body mass index (BMI) of 40.0 to 44.9 in adult (CMS/[facility])      Other Visit Diagnoses     Hospital discharge follow-up    -  Primary    Need for shingles vaccine        Diabetic gastroparesis associated with type 2 diabetes mellitus (CMS/[facility])        Relevant Orders    Ambulatory Referral to Gastroenterology        Adjusted lasix to 80mg daily x 3-4 days then revert back to 40mg daily    Patient's Body mass index is 40.45 kg/m². BMI is above normal parameters. Recommendations include: exercise counseling and nutrition counseling.    The patient has read and signed the [facility] Controlled Substance Contract.  I will continue to see patient for regular follow up appointments.  They are well controlled on their medication.  LESTER is updated every 3 months. The patient is aware of the potential for addiction and dependence.  lester reviewed [id number]  Tapered ativan  Refilled norco  Tapered neurontin    Reviewed hospital notes and results    Referral to GI    Script for shingles provided    B12 IM today    Recheck in 3 months

## 2018-08-08 DIAGNOSIS — F33.1 DEPRESSION, MAJOR, RECURRENT, MODERATE (HCC): ICD-10-CM

## 2018-08-08 RX ORDER — ARIPIPRAZOLE 15 MG/1
TABLET ORAL
Qty: 30 TABLET | Refills: 5 | Status: SHIPPED | OUTPATIENT
Start: 2018-08-08 | End: 2019-01-11

## 2018-08-17 DIAGNOSIS — Z12.31 ENCOUNTER FOR SCREENING MAMMOGRAM FOR BREAST CANCER: Primary | ICD-10-CM

## 2018-08-24 RX ORDER — ERGOCALCIFEROL 1.25 MG/1
CAPSULE ORAL
Qty: 12 CAPSULE | Refills: 2 | Status: SHIPPED | OUTPATIENT
Start: 2018-08-24 | End: 2019-01-11

## 2018-08-27 RX ORDER — CLOPIDOGREL BISULFATE 75 MG/1
TABLET ORAL
Qty: 90 TABLET | Refills: 3 | Status: SHIPPED | OUTPATIENT
Start: 2018-08-27 | End: 2019-01-11

## 2018-08-31 DIAGNOSIS — G89.29 CHRONIC PAIN OF MULTIPLE JOINTS: ICD-10-CM

## 2018-08-31 DIAGNOSIS — F41.1 GENERALIZED ANXIETY DISORDER: ICD-10-CM

## 2018-08-31 DIAGNOSIS — M25.50 CHRONIC PAIN OF MULTIPLE JOINTS: ICD-10-CM

## 2018-08-31 RX ORDER — LORAZEPAM 0.5 MG/1
0.5 TABLET ORAL DAILY PRN
Qty: 15 TABLET | Refills: 0 | Status: SHIPPED | OUTPATIENT
Start: 2018-08-31 | End: 2018-10-01 | Stop reason: SDUPTHER

## 2018-08-31 RX ORDER — HYDROCODONE BITARTRATE AND ACETAMINOPHEN 7.5; 325 MG/1; MG/1
1 TABLET ORAL EVERY 4 HOURS PRN
Qty: 180 TABLET | Refills: 0 | Status: SHIPPED | OUTPATIENT
Start: 2018-08-31 | End: 2018-10-01 | Stop reason: SDUPTHER

## 2018-09-07 ENCOUNTER — OFFICE VISIT (OUTPATIENT)
Dept: CARDIOLOGY | Facility: CLINIC | Age: 58
End: 2018-09-07

## 2018-09-07 VITALS
SYSTOLIC BLOOD PRESSURE: 128 MMHG | WEIGHT: 262.7 LBS | HEART RATE: 98 BPM | HEIGHT: 68 IN | BODY MASS INDEX: 39.81 KG/M2 | DIASTOLIC BLOOD PRESSURE: 74 MMHG | OXYGEN SATURATION: 93 %

## 2018-09-07 DIAGNOSIS — IMO0001 CLASS 3 OBESITY DUE TO EXCESS CALORIES WITHOUT SERIOUS COMORBIDITY WITH BODY MASS INDEX (BMI) OF 40.0 TO 44.9 IN ADULT: ICD-10-CM

## 2018-09-07 DIAGNOSIS — E78.2 MIXED HYPERLIPIDEMIA: ICD-10-CM

## 2018-09-07 DIAGNOSIS — I50.30 (HFPEF) HEART FAILURE WITH PRESERVED EJECTION FRACTION (HCC): ICD-10-CM

## 2018-09-07 DIAGNOSIS — I27.20 PULMONARY HYPERTENSION (HCC): Primary | ICD-10-CM

## 2018-09-07 DIAGNOSIS — I34.0 NON-RHEUMATIC MITRAL REGURGITATION: ICD-10-CM

## 2018-09-07 DIAGNOSIS — E53.8 CYANOCOBALAMIN DEFICIENCY: Primary | ICD-10-CM

## 2018-09-07 PROCEDURE — 99214 OFFICE O/P EST MOD 30 MIN: CPT | Performed by: INTERNAL MEDICINE

## 2018-09-07 RX ORDER — HYDROCHLOROTHIAZIDE 12.5 MG/1
CAPSULE, GELATIN COATED ORAL
Qty: 90 CAPSULE | Refills: 0 | Status: SHIPPED | OUTPATIENT
Start: 2018-09-07 | End: 2018-12-10 | Stop reason: SDUPTHER

## 2018-09-07 RX ORDER — ATORVASTATIN CALCIUM 40 MG/1
40 TABLET, FILM COATED ORAL NIGHTLY
Qty: 90 TABLET | Refills: 1 | Status: SHIPPED | OUTPATIENT
Start: 2018-09-07 | End: 2019-02-27 | Stop reason: SDUPTHER

## 2018-09-07 RX ORDER — SYRINGE WITH NEEDLE, 1 ML 28GX1/2"
1 SYRINGE, EMPTY DISPOSABLE MISCELLANEOUS TAKE AS DIRECTED
Qty: 1 EACH | Refills: 0 | Status: SHIPPED | OUTPATIENT
Start: 2018-09-07

## 2018-09-07 NOTE — PATIENT INSTRUCTIONS
Dr. Kwon has recommended a Six-Minute Walk Test    What Is the Six-Minute Walk Test?    The [employer] Society describes the six-minute walk test as a measure of functional status or fitness. It is used as a simple measure of aerobic exercise capacity. The results of this test may or may not lead your doctor to do more sophisticated measures of your heart and lung function. During this test, you walk at your normal pace for six minutes. This test can be used to monitor your response to treatments for heart, lung and other health problems. This test is commonly used for people with pulmonary hypertension, interstitial lung disease, pre-lung transplant evaluation or COPD.      What to Expect When Doing the Test      Preparing for your test:  · Wear clothes and shoes that are comfortable.  · You may use your usual walking aids such as a cane or walker, if needed.  · It is okay to eat a light meal prior to your test.  · Take your usual medications.  · Do not exercise within two hours of testing.      During the test:  · The krsitina will measure your blood pressure, pulse and oxygen level usually with a pulse oximeter before you start to walk.  · You should be given the following instructions: The object of the test is to walk as far as possible for six minutes. You will walk at your normal pace to a chair or cone, and turn around. And you continue to walk back and forth for six minutes.  · Let the staff know if you are having chest pain or breathing difficulty.  · It is acceptable to slow down, rest or stop. After every minute interval, you will be given an update.      Safety:  · The kristina will watch to see if you have breathing difficulty or chest pain.  · Oxygen and other supplies will be nearby if you need them.      Understanding the Results  The results of your test are then compared to what is known to be normal for people in your weight, height, gender and age categories. They can be used to estimate  response to treatment, especially if repeated after a time interval, for instance six months or a year later. After your test, your provider may change your medication or exercise program based on your results.      What Are the Risks?  This is a low-risk medical evaluation. Medical help is easily available while the test is being done.          This content was developed in partnership with the [employer], the philanthropic arm of the [employer].  Approved by Scientific and Medical Editorial Review Panel. Last reviewed May 31, 2017.        Obesity, Adult  Obesity is the condition of having too much total body fat. Being overweight or obese means that your weight is greater than what is considered healthy for your body size. Obesity is determined by a measurement called BMI. BMI is an estimate of body fat and is calculated from height and weight. For adults, a BMI of 30 or higher is considered obese.  Obesity can eventually lead to other health concerns and major illnesses, including:  · Stroke.  · Coronary artery disease (CAD).  · Type 2 diabetes.  · Some types of cancer, including cancers of the colon, breast, uterus, and gallbladder.  · Osteoarthritis.  · High blood pressure (hypertension).  · High cholesterol.  · Sleep apnea.  · Gallbladder stones.  · Infertility problems.    What are the causes?  The main cause of obesity is taking in (consuming) more calories than your body uses for energy. Other factors that contribute to this condition may include:  · Being born with genes that make you more likely to become obese.  · Having a medical condition that causes obesity. These conditions include:  ? Hypothyroidism.  ? Polycystic ovarian syndrome (PCOS).  ? Binge-eating disorder.  ? Cushing syndrome.  · Taking certain medicines, such as steroids, antidepressants, and seizure medicines.  · Not being physically active (sedentary lifestyle).  · Living where there are limited places to  exercise safely or buy healthy foods.  · Not getting enough sleep.    What increases the risk?  The following factors may increase your risk of this condition:  · Having a family history of obesity.  · Being a woman of -American descent.  · Being a man of  descent.    What are the signs or symptoms?  Having excessive body fat is the main symptom of this condition.  How is this diagnosed?  This condition may be diagnosed based on:  · Your symptoms.  · Your medical history.  · A physical exam. Your health care provider may measure:  ? Your BMI. If you are an adult with a BMI between 25 and less than 30, you are considered overweight. If you are an adult with a BMI of 30 or higher, you are considered obese.  ? The distances around your hips and your waist (circumferences). These may be compared to each other to help diagnose your condition.  ? Your skinfold thickness. Your health care provider may gently pinch a fold of your skin and measure it.    How is this treated?  Treatment for this condition often includes changing your lifestyle. Treatment may include some or all of the following:  · Dietary changes. Work with your health care provider and a dietitian to set a weight-loss goal that is healthy and reasonable for you. Dietary changes may include eating:  ? Smaller portions. A portion size is the amount of a particular food that is healthy for you to eat at one time. This varies from person to person.  ? Low-calorie or low-fat options.  ? More whole grains, fruits, and vegetables.  · Regular physical activity. This may include aerobic activity (cardio) and strength training.  · Medicine to help you lose weight. Your health care provider may prescribe medicine if you are unable to lose 1 pound a week after 6 weeks of eating more healthily and doing more physical activity.  · Surgery. Surgical options may include gastric banding and gastric bypass. Surgery may be done if:  ? Other treatments have not  helped to improve your condition.  ? You have a BMI of 40 or higher.  ? You have life-threatening health problems related to obesity.    Follow these instructions at home:    Eating and drinking    · Follow recommendations from your health care provider about what you eat and drink. Your health care provider may advise you to:  ? Limit fast foods, sweets, and processed snack foods.  ? Choose low-fat options, such as low-fat milk instead of whole milk.  ? Eat 5 or more servings of fruits or vegetables every day.  ? Eat at home more often. This gives you more control over what you eat.  ? Choose healthy foods when you eat out.  ? Learn what a healthy portion size is.  ? Keep low-fat snacks on hand.  ? Avoid sugary drinks, such as soda, fruit juice, iced tea sweetened with sugar, and flavored milk.  ? Eat a healthy breakfast.  · Drink enough water to keep your urine clear or pale yellow.  · Do not go without eating for long periods of time (do not fast) or follow a fad diet. Fasting and fad diets can be unhealthy and even dangerous.  Physical Activity  · Exercise regularly, as told by your health care provider. Ask your health care provider what types of exercise are safe for you and how often you should exercise.  · Warm up and stretch before being active.  · Cool down and stretch after being active.  · Rest between periods of activity.  Lifestyle  · Limit the time that you spend in front of your TV, computer, or video game system.  · Find ways to reward yourself that do not involve food.  · Limit alcohol intake to no more than 1 drink a day for nonpregnant women and 2 drinks a day for men. One drink equals 12 oz of beer, 5 oz of wine, or 1½ oz of hard liquor.  General instructions  · Keep a weight loss journal to keep track of the food you eat and how much you exercise you get.  · Take over-the-counter and prescription medicines only as told by your health care provider.  · Take vitamins and supplements only as told  by your health care provider.  · Consider joining a support group. Your health care provider may be able to recommend a support group.  · Keep all follow-up visits as told by your health care provider. This is important.  Contact a health care provider if:  · You are unable to meet your weight loss goal after 6 weeks of dietary and lifestyle changes.  This information is not intended to replace advice given to you by your health care provider. Make sure you discuss any questions you have with your health care provider.  Document Released: 01/25/2006 Document Revised: 05/22/2017 Document Reviewed: 10/05/2016  Elsevier Interactive Patient Education © 2018 [employer]

## 2018-09-07 NOTE — PROGRESS NOTES
Cardiovascular Medicine   Can Kwon M.D., Ph.D., [facility]      The patient returns to cardiovascular clinic for follow-up of the following:    PROBLEM LIST:  1. MV ASCAD  A. CABG, 2005  -LIMA-LAD: Prior PCI 2.5 x 28mm in 2004  -RCA, 60%  B. [facility], 6/2016  -pLAD: 50%  -Ostial D1-30%  -mLAD: 100%  -dLAD fills via LIMA  -OM1: 60%  -FFR was 0.93  2. HTN  3. HLD  4. DM2  5. Mild MR/TR  6. LE pain  7. PAH    Ariana Martinez is a 58 y.o. female who returns to clinic today in follow-up.  She does have a history of heart failure with preserved ejection fraction and pulmonary venous hypertension.  Concerning this, she has stable dyspnea.  She is noncompliant much of the time with her diet.  She is currently prescribed Toprol-XL and furosemide.  She's had emergency department visits or inpatient admissions for acute decompensated heart failure.  Weight is stable.  She denies any dizziness, lightheadedness or syncope.  She's had no PND or orthopnea.  Concerning her mitral regurgitation has remained mild.  Her most recent TTE was May 2018.  Concerning her coronary artery disease she is status post LIMA to the LAD by CABG in 2005.  She is currently prescribed aspirin, Plavix, Lipitor and Toprol-XL.  She denies any resting, exertional or nocturnal angina.  Concerning her hyperlipidemia she remains on a statin therapy.  She is medication compliant.  Denies side effects.      The following portions of the patient's history were reviewed and updated as appropriate: allergies, current medications, past family history, past medical history, past social history, past surgical history and problem list.      Review of Systems   Cardiovascular: Positive for claudication and dyspnea on exertion. Negative for chest pain, cyanosis, irregular heartbeat, leg swelling, near-syncope, orthopnea, palpitations, paroxysmal nocturnal dyspnea and syncope.   Respiratory: Positive for shortness of breath. Negative for cough, sputum production  and wheezing.      family history includes Diabetes in her other, sister, sister, sister, sister, sister, and sister; Heart disease in her mother, other, sister, and sister; Hypertension in her mother, other, sister, and sister; Stroke in her mother.   reports that she has never smoked. She has never used smokeless tobacco. She reports that she does not drink alcohol or use drugs.  Allergies   Allergen Reactions   • Seroquel [Quetiapine Fumarate] Anaphylaxis   • Avandia [Rosiglitazone] Swelling   • Morphine And Related Hallucinations     If patient takes too much       Current Outpatient Prescriptions:   •  ARIPiprazole (ABILIFY) 15 MG tablet, TAKE 1 TABLET BY MOUTH DAILY., Disp: 30 tablet, Rfl: 5  •  aspirin 81 MG chewable tablet, Chew 81 mg daily., Disp: , Rfl:   •  atorvastatin (LIPITOR) 40 MG tablet, TAKE 1 TABLET BY MOUTH EVERY NIGHT., Disp: 90 tablet, Rfl: 1  •  Blood Glucose Monitoring Suppl (ONE TOUCH ULTRA MINI) w/Device kit, USE AS DIRECTED TO CHECK BLOOD SUGAR, Disp: 1 each, Rfl: 0  •  Calcium Citrate-Vitamin D (CITRACAL/VITAMIN D) 250-200 MG-UNIT tablet, Take 2 tablets by mouth 2 (two) times a day., Disp: , Rfl:   •  clopidogrel (PLAVIX) 75 MG tablet, TAKE 1 TABLET BY MOUTH DAILY., Disp: 90 tablet, Rfl: 3  •  furosemide (LASIX) 40 MG tablet, Take 1 tablet by mouth Daily., Disp: 90 tablet, Rfl: 3  •  gabapentin (NEURONTIN) 400 MG capsule, Take 1 capsule by mouth 3 (Three) Times a Day., Disp: 90 capsule, Rfl: 2  •  GLUCAGON EMERGENCY 1 MG injection, USE AS DIRECTED AS NEEDED, Disp: 1 kit, Rfl: 0  •  glucose blood (ONE TOUCH ULTRA TEST) test strip, 1 each by Other route 4 (Four) Times a Day. Use as instructed, Disp: 400 each, Rfl: 1  •  HYDROcodone-acetaminophen (NORCO) 7.5-325 MG per tablet, Take 1 tablet by mouth Every 4 (Four) Hours As Needed for Moderate Pain ., Disp: 180 tablet, Rfl: 0  •  insulin aspart (NOVOLOG FLEXPEN) 100 UNIT/ML solution pen-injector sc pen, 60 units qac tid, Disp: 18 pen, Rfl:  11  •  Insulin Glargine (BASAGLAR KWIKPEN) 100 UNIT/ML injection pen, 100 units q hs, Disp: 5 pen, Rfl: 5  •  Insulin Pen Needle (B-D ULTRAFINE III SHORT PEN) 31G X 8 MM misc, Inject 1 each into the shoulder, thigh, or buttocks 4 (Four) Times a Day. use as directed, Disp: 150 each, Rfl: 11  •  LORazepam (ATIVAN) 0.5 MG tablet, Take 1 tablet by mouth Daily As Needed for Anxiety., Disp: 15 tablet, Rfl: 0  •  meclizine (ANTIVERT) 25 MG tablet, Take 1 tablet by mouth 3 (Three) Times a Day As Needed for dizziness., Disp: 90 tablet, Rfl: 6  •  metoclopramide (REGLAN) 10 MG tablet, Take 1 tablet by mouth 4 (Four) Times a Day Before Meals & at Bedtime. (Patient taking differently: Take 10 mg by mouth 4 (Four) Times a Day As Needed.), Disp: 30 tablet, Rfl: 0  •  metoprolol succinate XL (TOPROL-XL) 25 MG 24 hr tablet, TAKE 1 TABLET BY MOUTH DAILY., Disp: 31 tablet, Rfl: 6  •  mirtazapine (REMERON) 45 MG tablet, TAKE 1 TABLET BY MOUTH EVERY NIGHT., Disp: 90 tablet, Rfl: 1  •  ondansetron (ZOFRAN) 8 MG tablet, Take 1 tablet by mouth Every 8 (Eight) Hours. (Patient taking differently: Take 8 mg by mouth Every 8 (Eight) Hours As Needed.), Disp: 90 tablet, Rfl: 3  •  ONE TOUCH ULTRA TEST test strip, USE TO TEST SUGAR 4 TIMES A DAY, Disp: 100 each, Rfl: 3  •  pantoprazole (PROTONIX) 40 MG EC tablet, TAKE 1 TABLET BY MOUTH DAILY., Disp: 90 tablet, Rfl: 2  •  venlafaxine XR (EFFEXOR-XR) 150 MG 24 hr capsule, TAKE ONE CAPSULE BY MOUTH TWICE A DAY FOR DEPRESSION, Disp: 180 capsule, Rfl: 3  •  vitamin D (ERGOCALCIFEROL) 11737 units capsule capsule, TAKE ONE CAPSULE BY MOUTH EVERY SUNDAY, Disp: 12 capsule, Rfl: 2    Current Facility-Administered Medications:   •  cyanocobalamin injection 1,000 mcg, 1,000 mcg, Intramuscular, Q28 Days, Francisca Fong MD, 1,000 mcg at 08/02/18 0807    Physical Exam:  Vitals:    09/07/18 0850   BP: 128/74   Pulse: 98   SpO2: 93%     Body mass index is 39.94 kg/m².   Last Weights:   Wt Readings from  Last 3 Encounters:   08/02/18 121 kg (266 lb)   07/27/18 123 kg (271 lb)   07/23/18 123 kg (272 lb)     Pulse Ox: Normal   General: alert, appears stated age and cooperative  Body Habitus: obese  HEENT: Head: Normocephalic, no lesions, without obvious abnormality. No arcus senilis, xanthelasma or xanthomas.  JVP: 6 cm of water at 45 degrees   Volume/Pulsation: Normal  Heart rate: normal    Heart Rhythm: regular     Heart Sounds: S1: normal intensity  S2: normal intensity  S3: absent   S4: absent  Opening Snap: absent  A2-OS:  absent.   Pericardial rub: absent    Ejection click: None      Murmurs:  absent   Extremity: moves all extremities equally.       DATA REVIEWED:       TTE/LAMONTE:  Results for orders placed during the hospital encounter of 04/30/18   Adult Transthoracic Echo Complete W/ Cont if Necessary Per Protocol    Narrative · The quality of the study is limited due to poor acoustic windows related   to patient body habitus  · Left Ventricle: Estimated EF appears to be in the range of 51 - 55%.   Normal left ventricular cavity size noted  · There is moderate eccentric hypertrophy of the left ventricular cavity.   Left ventricular diastolic dysfunction is noted (grade II w/high LAP)   consistent with pseudonormalization.  · Right Ventricle: Normal right ventricular wall thickness, systolic   function and septal motion noted. Right ventricular cavity is borderline   dilated  · No evidence of a patent foramen ovale. No evidence of an atrial septal   defect present. Saline test results are negative.  · The aortic valve is abnormal with mild calcification of the right   coronary cusp.  · Mitral Valve: The mitral valve is abnormal in structure. There is   anterior leaflet thickening present. Mild mitral valve regurgitation is   present.  · Trace to mild tricuspid valve regurgitation is present. Estimated right   ventricular systolic pressure from tricuspid regurgitation is moderately   elevated (45-55 mmHg)  · Mild  pulmonary hypertension is present.  · There is no evidence of pericardial effusion.          Lab Results   Component Value Date    GLUCOSE 136 (H) 07/23/2018    BUN 9 07/23/2018    CREATININE 0.81 07/23/2018    EGFRIFNONA 73 07/23/2018    BCR 11.1 07/23/2018    K 3.5 07/23/2018    CO2 29.0 07/23/2018    CALCIUM 8.1 (L) 07/23/2018    ALBUMIN 3.20 (L) 07/23/2018    AST 24 07/23/2018    ALT 32 07/23/2018     Lab Results   Component Value Date    WBC 10.00 (H) 07/23/2018    HGB 11.1 (L) 07/23/2018    HCT 34.3 (L) 07/23/2018    MCV 85.8 07/23/2018     07/23/2018     Lab Results   Component Value Date    CHOL 197 04/16/2018    CHLPL 211 (H) 01/19/2017    TRIG 223 (H) 04/16/2018    HDL 33 (L) 04/16/2018     04/16/2018     Lab Results   Component Value Date    TSH 3.950 07/21/2018    N2MDSAX 9.70 08/30/2017    THYROIDAB <6 01/02/2015     Lab Results   Component Value Date    CKTOTAL 38 02/24/2018    CKMB 1.00 02/24/2018    TROPONINI <0.012 07/22/2018     Lab Results   Component Value Date    HGBA1C 6.4 07/27/2018     Lab Results   Component Value Date    DDIMER 722 (H) 11/11/2016     Lab Results   Component Value Date    ALT 32 07/23/2018     Lab Results   Component Value Date    HGBA1C 6.4 07/27/2018    HGBA1C 7.8 (H) 04/16/2018    HGBA1C 8.2 (H) 01/18/2018     Lab Results   Component Value Date    MICROALBUR 12.0 07/20/2017    CREATININE 0.81 07/23/2018     Lab Results   Component Value Date    IRON 20 (L) 10/19/2017     Lab Results   Component Value Date    INR 1.02 07/21/2018    INR 0.99 04/30/2018    INR 0.95 10/14/2017    PROTIME 13.2 07/21/2018    PROTIME 12.9 04/30/2018    PROTIME 12.6 10/14/2017       Assessment/Plan      Diagnosis Plan   1. Pulmonary hypertension.  WHO-group-2/3.  Today, euvolemic and perfused.   · Monitoring of mitral regurgitation   · Avoid tobacco products   · CPAP compliance   · 6MWT PTNA   2. (HFpEF) heart failure with preserved ejection fraction (CMS/HCC).  NYHA stage C.   Functional class 2/3.  Today, euvolemic and perfused.   · Weigh daily; Call for concerning weight gain or concerning symptoms  · Continue current HFpEF medications  · Low sodium diet     3. Non-rheumatic mitral regurgitation.  ACC stage B.   Clinical surveillance with plans for repeat 2-D TTE 2021    4. Mixed hyperlipidemia    · Dietary changes: Increase soluble fiber  · Reduce saturated fat and cholesterol  · Exercise changes: Advised to engage in aerobic exercise on most days of the week  · Statin: Yes  · ASA: Yes  · Continue follow-up visits with PCP for monitoring of labs         5. Class 3 obesity due to excess calories without serious comorbidity with body mass index (BMI) of 40.0 to 44.9 in adult (CMS/HCC)  · General patient education:  -Weight loss hand-out  -Exercise intervention:   Hand out, increase aerobic activity  -PCP Follow-up                     Return in about 6 months (around 3/7/2019).

## 2018-09-11 PROBLEM — I25.10 CAD (CORONARY ARTERY DISEASE): Status: ACTIVE | Noted: 2018-09-11

## 2018-09-11 PROBLEM — F32.A DEPRESSION: Status: ACTIVE | Noted: 2018-09-11

## 2018-09-11 PROBLEM — I10 HYPERTENSION: Status: ACTIVE | Noted: 2018-09-11

## 2018-09-18 ENCOUNTER — OFFICE VISIT (OUTPATIENT)
Dept: GASTROENTEROLOGY | Facility: CLINIC | Age: 58
End: 2018-09-18

## 2018-09-18 VITALS
BODY MASS INDEX: 40.04 KG/M2 | WEIGHT: 264.2 LBS | DIASTOLIC BLOOD PRESSURE: 78 MMHG | OXYGEN SATURATION: 96 % | HEIGHT: 68 IN | HEART RATE: 96 BPM | SYSTOLIC BLOOD PRESSURE: 122 MMHG

## 2018-09-18 DIAGNOSIS — K31.84 GASTROPARESIS: ICD-10-CM

## 2018-09-18 DIAGNOSIS — R10.10 UPPER ABDOMINAL PAIN: Primary | ICD-10-CM

## 2018-09-18 PROCEDURE — 99213 OFFICE O/P EST LOW 20 MIN: CPT | Performed by: NURSE PRACTITIONER

## 2018-09-18 RX ORDER — DEXTROSE AND SODIUM CHLORIDE 5; .45 G/100ML; G/100ML
30 INJECTION, SOLUTION INTRAVENOUS CONTINUOUS PRN
Status: CANCELLED | OUTPATIENT
Start: 2018-10-19

## 2018-09-19 NOTE — PROGRESS NOTES
Chief Complaint   Patient presents with   • Follow-up       Subjective    Ariana Martinez is a 58 y.o. female. she is here today for follow-up.    History of Present Illness  8-year-old female with history of gastroparesis on Reglan presents due to upper abdominal pain.  States she has intermittent nausea described episodes every few weeks.  States her bowel movements vary from constipation to diarrhea.  Denies any melena or hematochezia within her stool.  Her weight is stable.  Reports her reflux is controlled with Protonix which she has been on for some time however she has intermittent epigastric pain.previously saw the patient 9/12/17 due to nausea vomiting and changes in her bowel habits.  I had scheduled EGD and colonoscopy however she reports bowel habits returned to normal which is variable for her and she does not want to reschedule colonoscopy at this time.  Plan; we'll schedule patient for EGD due to gastritis, epigastric pain and chronic reflux.  I recommended repeat colonoscopy for surveillance as prior colonoscopy was cleared in 2013 however patient declines to schedule colonoscopy at this time.       The following portions of the patient's history were reviewed and updated as appropriate:   Past Medical History:   Diagnosis Date   • Acquired hypothyroidism    • Angina, class IV (CMS/HCC)    • Anxiety    • Benign paroxysmal positional vertigo    • Carpal tunnel syndrome    • Chronic pain    • Coronary atherosclerosis    • Depression    • Diabetes mellitus (CMS/HCC)     Type 2, controlled   • Diabetic polyneuropathy (CMS/HCC)    • Female stress incontinence    • Gastroparesis    • Hashimoto's thyroiditis    • Hyperlipidemia    • Hypertension    • Low back pain    • Malaise and fatigue    • Multiple joint pain    • Obesity     Refuses to be weighed   • Otalgia     Both   • Perforation of tympanic membrane     Left   • Postoperative wound infection    • Vitamin D deficiency      Past Surgical History:    Procedure Laterality Date   • ABDOMINAL SURGERY     • ANGIOPLASTY      coronary   • BREAST BIOPSY Right    • CARDIAC CATHETERIZATION     • CARDIAC CATHETERIZATION N/A 2017    Procedure: Right Heart Cath;  Surgeon: Can Kwon MD PhD;  Location: [facility] INVASIVE LOCATION;  Service:    • CARPAL TUNNEL RELEASE     • CHOLECYSTECTOMY     • CORONARY ARTERY BYPASS GRAFT     • ENDOSCOPY AND COLONOSCOPY     • FOOT SURGERY      Toes   • GASTRIC BANDING      Revision, laparoscopic   • HYSTERECTOMY     • MOUTH SURGERY     • SALPINGO OOPHORECTOMY     • SHOULDER SURGERY     • TRANSESOPHAGEAL ECHOCARDIOGRAM (LAMONTE)      With color flow     Family History   Problem Relation Age of Onset   • Diabetes Other    • Heart disease Other    • Hypertension Other    • Heart disease Mother    • Stroke Mother    • Hypertension Mother    • Diabetes Sister    • Heart disease Sister    • Hypertension Sister    • Heart disease Sister    • Diabetes Sister    • Hypertension Sister    • Diabetes Sister    • Diabetes Sister    • Diabetes Sister    • Diabetes Sister      OB History      Para Term  AB Living    0 0 0 0 0 0    SAB TAB Ectopic Molar Multiple Live Births    0 0 0   0          Current Outpatient Prescriptions   Medication Sig Dispense Refill   • ARIPiprazole (ABILIFY) 15 MG tablet TAKE 1 TABLET BY MOUTH DAILY. 30 tablet 5   • aspirin 81 MG chewable tablet Chew 81 mg daily.     • atorvastatin (LIPITOR) 40 MG tablet TAKE 1 TABLET BY MOUTH EVERY NIGHT. 90 tablet 1   • BD SHARPS CONTAINER HOME misc 1 each Take As Directed. 1 each 0   • Blood Glucose Monitoring Suppl (ONE TOUCH ULTRA MINI) w/Device kit USE AS DIRECTED TO CHECK BLOOD SUGAR 1 each 0   • Calcium Citrate-Vitamin D (CITRACAL/VITAMIN D) 250-200 MG-UNIT tablet Take 2 tablets by mouth 2 (two) times a day.     • cefprozil (CEFZIL) 500 MG tablet Take 1 tablet by mouth 2 (Two) Times a Day for 10 days. 20 tablet 0   • clopidogrel (PLAVIX) 75 MG tablet  TAKE 1 TABLET BY MOUTH DAILY. 90 tablet 3   • Cyanocobalamin 1000 MCG/ML kit Inject 1,000 mcg as directed Every 28 (Twenty-Eight) Days. 1 kit 0   • fluticasone (FLONASE) 50 MCG/ACT nasal spray 2 sprays into the nostril(s) as directed by provider Daily for 30 days. 1 bottle 0   • furosemide (LASIX) 40 MG tablet Take 1 tablet by mouth Daily. 90 tablet 3   • gabapentin (NEURONTIN) 400 MG capsule Take 1 capsule by mouth 3 (Three) Times a Day. 90 capsule 2   • GLUCAGON EMERGENCY 1 MG injection USE AS DIRECTED AS NEEDED 1 kit 0   • glucose blood (ONE TOUCH ULTRA TEST) test strip 1 each by Other route 4 (Four) Times a Day. Use as instructed 400 each 1   • hydrochlorothiazide (MICROZIDE) 12.5 MG capsule TAKE 1 CAPSULE BY MOUTH DAILY. 90 capsule 0   • HYDROcodone-acetaminophen (NORCO) 7.5-325 MG per tablet Take 1 tablet by mouth Every 4 (Four) Hours As Needed for Moderate Pain . 180 tablet 0   • insulin aspart (NOVOLOG FLEXPEN) 100 UNIT/ML solution pen-injector sc pen 60 units qac tid 18 pen 11   • Insulin Glargine (BASAGLAR KWIKPEN) 100 UNIT/ML injection pen 100 units q hs 5 pen 5   • Insulin Pen Needle (B-D ULTRAFINE III SHORT PEN) 31G X 8 MM misc Inject 1 each into the shoulder, thigh, or buttocks 4 (Four) Times a Day. use as directed 150 each 11   • LORazepam (ATIVAN) 0.5 MG tablet Take 1 tablet by mouth Daily As Needed for Anxiety. 15 tablet 0   • meclizine (ANTIVERT) 25 MG tablet Take 1 tablet by mouth 3 (Three) Times a Day As Needed for dizziness. 90 tablet 6   • metoclopramide (REGLAN) 10 MG tablet Take 1 tablet by mouth 4 (Four) Times a Day Before Meals & at Bedtime. (Patient taking differently: Take 10 mg by mouth 4 (Four) Times a Day As Needed.) 30 tablet 0   • metoprolol succinate XL (TOPROL-XL) 25 MG 24 hr tablet TAKE 1 TABLET BY MOUTH DAILY. 31 tablet 6   • mirtazapine (REMERON) 45 MG tablet TAKE 1 TABLET BY MOUTH EVERY NIGHT. 90 tablet 1   • ondansetron (ZOFRAN) 8 MG tablet Take 1 tablet by mouth Every 8  "(Eight) Hours. (Patient taking differently: Take 8 mg by mouth Every 8 (Eight) Hours As Needed.) 90 tablet 3   • ONE TOUCH ULTRA TEST test strip USE TO TEST SUGAR 4 TIMES A  each 3   • pantoprazole (PROTONIX) 40 MG EC tablet TAKE 1 TABLET BY MOUTH DAILY. 90 tablet 2   • promethazine-dextromethorphan (PROMETHAZINE-DM) 6.25-15 MG/5ML syrup Take one teaspoon qhs prn cough 120 mL 0   • Syringe/Needle, Disp, (LUER LOCK SAFETY SYRINGES) 25G X 5/8\" 3 ML misc 1 each Daily. 100 each 0   • venlafaxine XR (EFFEXOR-XR) 150 MG 24 hr capsule TAKE ONE CAPSULE BY MOUTH TWICE A DAY FOR DEPRESSION 180 capsule 3   • vitamin D (ERGOCALCIFEROL) 24333 units capsule capsule TAKE ONE CAPSULE BY MOUTH EVERY SUNDAY 12 capsule 2     Current Facility-Administered Medications   Medication Dose Route Frequency Provider Last Rate Last Dose   • cyanocobalamin injection 1,000 mcg  1,000 mcg Intramuscular Q28 Days Francisca Fong MD   1,000 mcg at 08/02/18 0807     Allergies   Allergen Reactions   • Seroquel [Quetiapine Fumarate] Anaphylaxis   • Avandia [Rosiglitazone] Swelling   • Morphine And Related Hallucinations     If patient takes too much     Social History     Social History   • Marital status:      Social History Main Topics   • Smoking status: Never Smoker   • Smokeless tobacco: Never Used   • Alcohol use No   • Drug use: No   • Sexual activity: Defer      Comment:      Other Topics Concern   • Not on file       Review of Systems  Review of Systems   Constitutional: Negative for activity change, appetite change, chills, diaphoresis, fatigue, fever and unexpected weight change.   HENT: Negative for sore throat and trouble swallowing.    Respiratory: Negative for shortness of breath.    Gastrointestinal: Positive for abdominal distention (bloating ), abdominal pain (upper ), constipation, diarrhea and nausea. Negative for anal bleeding, blood in stool, rectal pain and vomiting.   Musculoskeletal: Negative for " "arthralgias.   Skin: Negative for pallor.   Neurological: Negative for light-headedness.        /78   Pulse 96   Ht 172.7 cm (68\")   Wt 120 kg (264 lb 3.2 oz)   SpO2 96%   BMI 40.17 kg/m²     Objective    Physical Exam   Constitutional: She is oriented to person, place, and time. She appears well-developed and well-nourished. She is cooperative. No distress.   HENT:   Head: Normocephalic and atraumatic.   Neck: Normal range of motion. Neck supple. No thyromegaly present.   Cardiovascular: Normal rate, regular rhythm and normal heart sounds.    Pulmonary/Chest: Effort normal and breath sounds normal. She has no wheezes. She has no rhonchi. She has no rales.   Abdominal: Soft. Normal appearance and bowel sounds are normal. She exhibits no shifting dullness, no distension, no fluid wave and no ascites. There is no hepatosplenomegaly. There is tenderness in the right upper quadrant, epigastric area and left upper quadrant. There is no rigidity and no guarding. No hernia.   Lymphadenopathy:     She has no cervical adenopathy.   Neurological: She is alert and oriented to person, place, and time.   Skin: Skin is warm, dry and intact. No rash noted. No pallor.   Psychiatric: She has a normal mood and affect. Her speech is normal.     Office Visit on 07/27/2018   Component Date Value Ref Range Status   • Hemoglobin A1C 07/27/2018 6.4  % Final     Assessment/Plan      1. Upper abdominal pain    2. Gastroparesis    .       Orders placed during this encounter include:  Orders Placed This Encounter   Procedures   • Follow Anesthesia Guidelines / Standing Orders     Standing Status:   Future       ESOPHAGOGASTRODUODENOSCOPY possible dilation (N/A)    Review and/or summary of lab tests, radiology, procedures, medications. Review and summary of old records and obtaining of history. The risks and benefits of my recommendations, as well as other treatment options were discussed with the patient today. Questions were " answered.    No orders of the defined types were placed in this encounter.      Follow-up: Return in about 4 weeks (around 10/16/2018) for Recheck after test.          This document has been electronically signed by BEBE Leach on September 19, 2018 2:54 PM             Results for orders placed or performed in visit on 07/27/18   POC Glycosylated Hemoglobin (Hb A1C)   Result Value Ref Range    Hemoglobin A1C 6.4 %   Results for orders placed or performed during the hospital encounter of 07/21/18   Gold Top - SST   Result Value Ref Range    Extra Tube Hold for add-ons.    Gold Top - SST   Result Value Ref Range    Extra Tube Hold for add-ons.    Green Top (Gel)   Result Value Ref Range    Extra Tube Hold for add-ons.    Green Top (Gel)   Result Value Ref Range    Extra Tube Hold for add-ons.    Urinalysis With Culture If Indicated - Urine, Clean Catch   Result Value Ref Range    Color, UA Yellow Yellow, Straw, Dark Yellow, Kristel    Appearance, UA Clear Clear    pH, UA 6.0 5.0 - 9.0    Specific [place], UA 1.015 1.003 - 1.030    Glucose, UA Negative Negative    Ketones, UA Negative Negative    Bilirubin, UA Negative Negative    Blood, UA Negative Negative    Protein, UA Trace (A) Negative    Leuk Esterase, UA Negative Negative    Nitrite, UA Negative Negative    Urobilinogen, UA 1.0 E.U./dL 0.2 - 1.0 E.U./dL   Respiratory Panel, PCR - Swab, Nasopharynx   Result Value Ref Range    ADENOVIRUS, PCR Not Detected Not Detected    Coronavirus 229E Not Detected Not Detected    Coronavirus HKU1 Not Detected Not Detected    Coronavirus NL63 Not Detected Not Detected    Coronavirus OC43 Not Detected Not Detected    Human Metapneumovirus Not Detected Not Detected    Human Rhinovirus/Enterovirus Not Detected Not Detected    Influenza B PCR Not Detected Not Detected    Parainfluenza Virus 1 Not Detected Not Detected    Parainfluenza Virus 2 Not Detected Not Detected    Parainfluenza Virus 3 Not Detected Not Detected     Parainfluenza Virus 4 Not Detected Not Detected    Bordetella pertussis pcr Not Detected Not Detected    Influenza A H1 2009 PCR Not Detected Not Detected    Chlamydophila pneumoniae PCR Not Detected Not Detected    Mycoplasma pneumo by PCR Not Detected Not Detected    Influenza A PCR Not Detected Not Detected    Influenza A H3 Not Detected Not Detected    Influenza A H1 Not Detected Not Detected    RSV, PCR Not Detected Not Detected   [facility] (IgG / M)   Result Value Ref Range    RMSF IgG Negative Negative    RMSF IgM 0.13 0.00 - 0.89 index   Babesia Microti Antibody Panel   Result Value Ref Range    Babesia microti IgM <1:10 Neg:<1:10    Babesia microti IgG <1:10 Neg:<1:10   CBC Auto Differential   Result Value Ref Range    WBC 10.00 (H) 3.20 - 9.80 10*3/mm3    RBC 4.00 3.77 - 5.16 10*6/mm3    Hemoglobin 11.1 (L) 12.0 - 15.5 g/dL    Hematocrit 34.3 (L) 35.0 - 45.0 %    MCV 85.8 80.0 - 98.0 fL    MCH 27.8 26.5 - 34.0 pg    MCHC 32.4 31.4 - 36.0 g/dL    RDW 14.4 11.5 - 14.5 %    RDW-SD 45.0 36.4 - 46.3 fl    MPV 9.4 8.0 - 12.0 fL    Platelets 372 150 - 450 10*3/mm3    Neutrophil % 62.0 37.0 - 80.0 %    Lymphocyte % 25.2 10.0 - 50.0 %    Monocyte % 7.3 0.0 - 12.0 %    Eosinophil % 4.9 0.0 - 7.0 %    Basophil % 0.2 0.0 - 2.0 %    Immature Grans % 0.4 0.0 - 0.5 %    Neutrophils, Absolute 6.20 2.00 - 8.60 10*3/mm3    Lymphocytes, Absolute 2.52 0.60 - 4.20 10*3/mm3    Monocytes, Absolute 0.73 0.00 - 0.90 10*3/mm3    Eosinophils, Absolute 0.49 0.00 - 0.70 10*3/mm3    Basophils, Absolute 0.02 0.00 - 0.20 10*3/mm3    Immature Grans, Absolute 0.04 (H) 0.00 - 0.02 10*3/mm3   CBC Auto Differential   Result Value Ref Range    WBC 11.56 (H) 3.20 - 9.80 10*3/mm3    RBC 4.44 3.77 - 5.16 10*6/mm3    Hemoglobin 12.3 12.0 - 15.5 g/dL    Hematocrit 38.7 35.0 - 45.0 %    MCV 87.2 80.0 - 98.0 fL    MCH 27.7 26.5 - 34.0 pg    MCHC 31.8 31.4 - 36.0 g/dL    RDW 14.7 (H) 11.5 - 14.5 %    RDW-SD 46.9 (H) 36.4 - 46.3 fl    MPV 10.1  8.0 - 12.0 fL    Platelets 389 150 - 450 10*3/mm3    Neutrophil % 62.5 37.0 - 80.0 %    Lymphocyte % 26.8 10.0 - 50.0 %    Monocyte % 6.5 0.0 - 12.0 %    Eosinophil % 3.6 0.0 - 7.0 %    Basophil % 0.3 0.0 - 2.0 %    Immature Grans % 0.3 0.0 - 0.5 %    Neutrophils, Absolute 7.22 2.00 - 8.60 10*3/mm3    Lymphocytes, Absolute 3.10 0.60 - 4.20 10*3/mm3    Monocytes, Absolute 0.75 0.00 - 0.90 10*3/mm3    Eosinophils, Absolute 0.42 0.00 - 0.70 10*3/mm3    Basophils, Absolute 0.04 0.00 - 0.20 10*3/mm3    Immature Grans, Absolute 0.03 (H) 0.00 - 0.02 10*3/mm3   CBC Auto Differential   Result Value Ref Range    WBC 12.16 (H) 3.20 - 9.80 10*3/mm3    RBC 4.62 3.77 - 5.16 10*6/mm3    Hemoglobin 12.9 12.0 - 15.5 g/dL    Hematocrit 38.7 35.0 - 45.0 %    MCV 83.8 80.0 - 98.0 fL    MCH 27.9 26.5 - 34.0 pg    MCHC 33.3 31.4 - 36.0 g/dL    RDW 14.3 11.5 - 14.5 %    RDW-SD 43.6 36.4 - 46.3 fl    MPV 9.7 8.0 - 12.0 fL    Platelets 418 150 - 450 10*3/mm3    Neutrophil % 72.7 37.0 - 80.0 %    Lymphocyte % 17.8 10.0 - 50.0 %    Monocyte % 6.4 0.0 - 12.0 %    Eosinophil % 2.7 0.0 - 7.0 %    Basophil % 0.2 0.0 - 2.0 %    Immature Grans % 0.2 0.0 - 0.5 %    Neutrophils, Absolute 8.83 (H) 2.00 - 8.60 10*3/mm3    Lymphocytes, Absolute 2.16 0.60 - 4.20 10*3/mm3    Monocytes, Absolute 0.78 0.00 - 0.90 10*3/mm3    Eosinophils, Absolute 0.33 0.00 - 0.70 10*3/mm3    Basophils, Absolute 0.03 0.00 - 0.20 10*3/mm3    Immature Grans, Absolute 0.03 (H) 0.00 - 0.02 10*3/mm3     *Note: Due to a large number of results and/or encounters for the requested time period, some results have not been displayed. A complete set of results can be found in Results Review.

## 2018-09-19 NOTE — PATIENT INSTRUCTIONS
Esophagogastroduodenoscopy  Esophagogastroduodenoscopy (EGD) is a procedure to examine the lining of the esophagus, stomach, and first part of the small intestine (duodenum). This procedure is done to check for problems such as inflammation, bleeding, ulcers, or growths.  During this procedure, a long, flexible, lighted tube with a camera attached (endoscope) is inserted down the throat.  Tell a health care provider about:  · Any allergies you have.  · All medicines you are taking, including vitamins, herbs, eye drops, creams, and over-the-counter medicines.  · Any problems you or family members have had with anesthetic medicines.  · Any blood disorders you have.  · Any surgeries you have had.  · Any medical conditions you have.  · Whether you are pregnant or may be pregnant.  What are the risks?  Generally, this is a safe procedure. However, problems may occur, including:  · Infection.  · Bleeding.  · A tear (perforation) in the esophagus, stomach, or duodenum.  · Trouble breathing.  · Excessive sweating.  · Spasms of the larynx.  · A slowed heartbeat.  · Low blood pressure.    What happens before the procedure?  · Follow instructions from your health care provider about eating or drinking restrictions.  · Ask your health care provider about:  ? Changing or stopping your regular medicines. This is especially important if you are taking diabetes medicines or blood thinners.  ? Taking medicines such as aspirin and ibuprofen. These medicines can thin your blood. Do not take these medicines before your procedure if your health care provider instructs you not to.  · Plan to have someone take you home after the procedure.  · If you wear dentures, be ready to remove them before the procedure.  What happens during the procedure?  · To reduce your risk of infection, your health care team will wash or sanitize their hands.  · An IV tube will be put in a vein in your hand or arm. You will get medicines and fluids through this  tube.  · You will be given one or more of the following:  ? A medicine to help you relax (sedative).  ? A medicine to numb the area (local anesthetic). This medicine may be sprayed into your throat. It will make you feel more comfortable and keep you from gagging or coughing during the procedure.  ? A medicine for pain.  · A mouth guard may be placed in your mouth to protect your teeth and to keep you from biting on the endoscope.  · You will be asked to lie on your left side.  · The endoscope will be lowered down your throat into your esophagus, stomach, and duodenum.  · Air will be put into the endoscope. This will help your health care provider see better.  · The lining of your esophagus, stomach, and duodenum will be examined.  · Your health care provider may:  ? Take a tissue sample so it can be looked at in a lab (biopsy).  ? Remove growths.  ? Remove objects (foreign bodies) that are stuck.  ? Treat any bleeding with medicines or other devices that stop tissue from bleeding.  ? Widen (dilate) or stretch narrowed areas of your esophagus and stomach.  · The endoscope will be taken out.  The procedure may vary among health care providers and hospitals.  What happens after the procedure?  · Your blood pressure, heart rate, breathing rate, and blood oxygen level will be monitored often until the medicines you were given have worn off.  · Do not eat or drink anything until the numbing medicine has worn off and your gag reflex has returned.  This information is not intended to replace advice given to you by your health care provider. Make sure you discuss any questions you have with your health care provider.  Document Released: 04/20/2006 Document Revised: 05/25/2017 Document Reviewed: 11/10/2016  [employer] Interactive Patient Education © 2018 [employer]

## 2018-09-24 ENCOUNTER — TELEPHONE (OUTPATIENT)
Dept: FAMILY MEDICINE CLINIC | Facility: CLINIC | Age: 58
End: 2018-09-24

## 2018-09-24 NOTE — TELEPHONE ENCOUNTER
----- Message from Francisca Fong MD sent at 9/24/2018  1:25 PM CDT -----  Mammogram is normal. - repeat annually

## 2018-10-01 DIAGNOSIS — G89.29 CHRONIC PAIN OF MULTIPLE JOINTS: ICD-10-CM

## 2018-10-01 DIAGNOSIS — E53.8 CYANOCOBALAMIN DEFICIENCY: ICD-10-CM

## 2018-10-01 DIAGNOSIS — F41.1 GENERALIZED ANXIETY DISORDER: ICD-10-CM

## 2018-10-01 DIAGNOSIS — M25.50 CHRONIC PAIN OF MULTIPLE JOINTS: ICD-10-CM

## 2018-10-01 RX ORDER — HYDROCODONE BITARTRATE AND ACETAMINOPHEN 7.5; 325 MG/1; MG/1
1 TABLET ORAL EVERY 4 HOURS PRN
Qty: 180 TABLET | Refills: 0 | Status: SHIPPED | OUTPATIENT
Start: 2018-10-01 | End: 2018-10-29 | Stop reason: SDUPTHER

## 2018-10-01 RX ORDER — LORAZEPAM 0.5 MG/1
0.5 TABLET ORAL DAILY PRN
Qty: 10 TABLET | Refills: 0 | Status: SHIPPED | OUTPATIENT
Start: 2018-10-01 | End: 2018-11-02 | Stop reason: SDUPTHER

## 2018-10-03 RX ORDER — VENLAFAXINE HYDROCHLORIDE 150 MG/1
CAPSULE, EXTENDED RELEASE ORAL
Qty: 180 CAPSULE | Refills: 3 | Status: SHIPPED | OUTPATIENT
Start: 2018-10-03 | End: 2019-01-11

## 2018-10-19 ENCOUNTER — HOSPITAL ENCOUNTER (OUTPATIENT)
Facility: HOSPITAL | Age: 58
Setting detail: HOSPITAL OUTPATIENT SURGERY
Discharge: HOME | End: 2018-10-19
Attending: INTERNAL MEDICINE | Admitting: INTERNAL MEDICINE

## 2018-10-19 ENCOUNTER — ANESTHESIA (OUTPATIENT)
Dept: GASTROENTEROLOGY | Facility: HOSPITAL | Age: 58
End: 2018-10-19

## 2018-10-19 ENCOUNTER — ANESTHESIA EVENT (OUTPATIENT)
Dept: GASTROENTEROLOGY | Facility: HOSPITAL | Age: 58
End: 2018-10-19

## 2018-10-19 VITALS
DIASTOLIC BLOOD PRESSURE: 64 MMHG | SYSTOLIC BLOOD PRESSURE: 113 MMHG | TEMPERATURE: 97.4 F | HEART RATE: 72 BPM | RESPIRATION RATE: 16 BRPM | HEIGHT: 68 IN | OXYGEN SATURATION: 98 % | WEIGHT: 264.11 LBS | BODY MASS INDEX: 40.03 KG/M2

## 2018-10-19 DIAGNOSIS — R10.10 UPPER ABDOMINAL PAIN: ICD-10-CM

## 2018-10-19 DIAGNOSIS — K31.84 GASTROPARESIS: ICD-10-CM

## 2018-10-19 LAB — GLUCOSE BLDC GLUCOMTR-MCNC: 71 MG/DL (ref 70–130)

## 2018-10-19 PROCEDURE — 88305 TISSUE EXAM BY PATHOLOGIST: CPT | Performed by: INTERNAL MEDICINE

## 2018-10-19 PROCEDURE — 25010000002 PROPOFOL 10 MG/ML EMULSION: Performed by: NURSE ANESTHETIST, CERTIFIED REGISTERED

## 2018-10-19 PROCEDURE — 82962 GLUCOSE BLOOD TEST: CPT

## 2018-10-19 PROCEDURE — 43239 EGD BIOPSY SINGLE/MULTIPLE: CPT | Performed by: INTERNAL MEDICINE

## 2018-10-19 PROCEDURE — 88305 TISSUE EXAM BY PATHOLOGIST: CPT | Performed by: PATHOLOGY

## 2018-10-19 RX ORDER — DEXTROSE AND SODIUM CHLORIDE 5; .45 G/100ML; G/100ML
30 INJECTION, SOLUTION INTRAVENOUS CONTINUOUS PRN
Status: DISCONTINUED | OUTPATIENT
Start: 2018-10-19 | End: 2018-10-19 | Stop reason: HOSPADM

## 2018-10-19 RX ORDER — ONDANSETRON 2 MG/ML
4 INJECTION INTRAMUSCULAR; INTRAVENOUS ONCE AS NEEDED
Status: DISCONTINUED | OUTPATIENT
Start: 2018-10-19 | End: 2018-10-19 | Stop reason: HOSPADM

## 2018-10-19 RX ORDER — PROPOFOL 10 MG/ML
VIAL (ML) INTRAVENOUS AS NEEDED
Status: DISCONTINUED | OUTPATIENT
Start: 2018-10-19 | End: 2018-10-19 | Stop reason: SURG

## 2018-10-19 RX ORDER — LIDOCAINE HYDROCHLORIDE 20 MG/ML
INJECTION, SOLUTION INFILTRATION; PERINEURAL AS NEEDED
Status: DISCONTINUED | OUTPATIENT
Start: 2018-10-19 | End: 2018-10-19 | Stop reason: SURG

## 2018-10-19 RX ADMIN — DEXTROSE AND SODIUM CHLORIDE 30 ML/HR: 5; 450 INJECTION, SOLUTION INTRAVENOUS at 13:11

## 2018-10-19 RX ADMIN — LIDOCAINE HYDROCHLORIDE 100 MG: 20 INJECTION, SOLUTION INFILTRATION; PERINEURAL at 13:54

## 2018-10-19 RX ADMIN — PROPOFOL 20 MG: 10 INJECTION, EMULSION INTRAVENOUS at 13:56

## 2018-10-19 RX ADMIN — PROPOFOL 80 MG: 10 INJECTION, EMULSION INTRAVENOUS at 13:54

## 2018-10-19 NOTE — ANESTHESIA PREPROCEDURE EVALUATION
Anesthesia Evaluation     Patient summary reviewed   NPO Solid Status: > 8 hours  NPO Liquid Status: > 2 hours           Airway   Mallampati: II  TM distance: >3 FB  Large neck circumference and Possible difficult intubation  Dental      Pulmonary - normal exam   Cardiovascular - normal exam    ECG reviewed  Patient on routine beta blocker and Beta blocker given within 24 hours of surgery    (+) hypertension, CAD, CABG, hyperlipidemia,     ROS comment: Left Ventricle Estimated EF appears to be in the range of 51 - 55%. Normal left ventricular cavity size noted. All left ventricular wall segments contract normally. There is moderate eccentric hypertrophy of the left ventricular cavity.. Left ventricular diastolic dysfunction is noted (grade II w/high LAP) consistent with pseudonormalization. Elevated left atrial pressure. There is no evidence of a left ventricular mass present.    Right Ventricle Normal right ventricular wall thickness, systolic function and septal motion noted. Right ventricular cavity is borderline dilated. No evidence of a right ventricular thrombus present. No evidence of a right ventricular mass present.    Left Atrium Normal left atrial size and volume noted. No evidence of a left atrial thrombus present. No evidence of a left atrial mass present. The interatrial septum does not appear to be redundant. No interatrial septal aneurysm present. No lipomatous hypertrophy of the interatrial septum present. No evidence of a patent foramen ovale. No evidence of an atrial septal defect present. . Saline test results are negative.    Right Atrium Normal right atrial size noted. Inferior vena cava not well visualized.    Aortic Valve The aortic valve is abnormal in structure. There is mild calcification of the aortic valve mainly affecting the right coronary cusp(s).    Mitral Valve The mitral valve is abnormal in structure. There is anterior leaflet thickening present. Mild mitral valve regurgitation  is present.    Tricuspid Valve The tricuspid valve is normal. No tricuspid valve stenosis is present. Trace to mild tricuspid valve regurgitation is present. Estimated right ventricular systolic pressure from tricuspid regurgitation is moderately elevated (45-55 mmHg). Mild pulmonary hypertension is present.    Pulmonic Valve The pulmonic valve was not assessed.    Greater Vessels No dilation of the aortic root is present. No dilation of the sinuses of Valsalva is present. The ascending aorta was not assessed. The aortic arch was not assessed. The descending aorta was not assessed.    Pericardium The pericardium is normal. There is no evidence of pericardial effusion.          Neuro/Psych  (+) numbness, psychiatric history Anxiety and Depression,     GI/Hepatic/Renal/Endo    (+) obesity, morbid obesity, GERD,  diabetes mellitus, hypothyroidism,     Musculoskeletal     Abdominal   (+) obese,    Substance History      OB/GYN          Other                        Anesthesia Plan    ASA 3     MAC     intravenous induction   Anesthetic plan, all risks, benefits, and alternatives have been provided, discussed and informed consent has been obtained with: patient.

## 2018-10-19 NOTE — ANESTHESIA POSTPROCEDURE EVALUATION
Patient: Ariana Martinez    Procedure Summary     Date:  10/19/18 Room / Location:  [facility] ENDOSCOPY 1 / [facility] ENDOSCOPY    Anesthesia Start:  1345 Anesthesia Stop:  1400    Procedure:  ESOPHAGOGASTRODUODENOSCOPY possible dilation (N/A ) Diagnosis:       Gastroparesis      Upper abdominal pain      (Gastroparesis [K31.84])      (Upper abdominal pain [R10.10])    Surgeon:  Julián Maldonado MD Provider:  Ron Smith CRNA    Anesthesia Type:  MAC ASA Status:  3          Anesthesia Type: MAC  Last vitals  BP   159/65 (10/19/18 1241)   Temp   97.4 °F (36.3 °C) (10/19/18 1241)   Pulse   76 (10/19/18 1241)   Resp   20 (10/19/18 1241)     SpO2   99 % (10/19/18 1241)     Post Anesthesia Care and Evaluation    Patient location during evaluation: PACU  Level of consciousness: responsive to light touch  Pain score: 0  Pain management: adequate  Airway patency: patent  Anesthetic complications: No anesthetic complications  PONV Status: none  Cardiovascular status: acceptable and hemodynamically stable  Respiratory status: acceptable and spontaneous ventilation  Hydration status: acceptable

## 2018-10-22 LAB
LAB AP CASE REPORT: NORMAL
PATH REPORT.FINAL DX SPEC: NORMAL
PATH REPORT.GROSS SPEC: NORMAL

## 2018-10-23 DIAGNOSIS — E11.42 DIABETIC PERIPHERAL NEUROPATHY ASSOCIATED WITH TYPE 2 DIABETES MELLITUS (HCC): ICD-10-CM

## 2018-10-24 RX ORDER — GABAPENTIN 400 MG/1
CAPSULE ORAL
Qty: 90 CAPSULE | Refills: 2 | Status: SHIPPED | OUTPATIENT
Start: 2018-10-24 | End: 2019-01-11

## 2018-10-27 DIAGNOSIS — E53.8 CYANOCOBALAMIN DEFICIENCY: ICD-10-CM

## 2018-10-29 DIAGNOSIS — G89.29 CHRONIC PAIN OF MULTIPLE JOINTS: ICD-10-CM

## 2018-10-29 DIAGNOSIS — M25.50 CHRONIC PAIN OF MULTIPLE JOINTS: ICD-10-CM

## 2018-10-29 RX ORDER — CYANOCOBALAMIN 1000 UG/ML
INJECTION, SOLUTION INTRAMUSCULAR; SUBCUTANEOUS
Qty: 1 ML | Refills: 0 | Status: SHIPPED | OUTPATIENT
Start: 2018-10-29 | End: 2018-11-28 | Stop reason: SDUPTHER

## 2018-10-30 ENCOUNTER — OFFICE VISIT (OUTPATIENT)
Dept: GASTROENTEROLOGY | Facility: CLINIC | Age: 58
End: 2018-10-30

## 2018-10-30 VITALS
HEART RATE: 92 BPM | SYSTOLIC BLOOD PRESSURE: 126 MMHG | DIASTOLIC BLOOD PRESSURE: 74 MMHG | HEIGHT: 68 IN | WEIGHT: 264 LBS | OXYGEN SATURATION: 96 % | BODY MASS INDEX: 40.01 KG/M2

## 2018-10-30 DIAGNOSIS — K21.9 GERD WITHOUT ESOPHAGITIS: ICD-10-CM

## 2018-10-30 DIAGNOSIS — R10.10 UPPER ABDOMINAL PAIN: Primary | ICD-10-CM

## 2018-10-30 PROCEDURE — 99213 OFFICE O/P EST LOW 20 MIN: CPT | Performed by: NURSE PRACTITIONER

## 2018-10-30 NOTE — PROGRESS NOTES
Chief Complaint   Patient presents with   • Abdominal Pain   • Heartburn       Subjective    Ariana Martinez is a 58 y.o. female. she is here today for follow-up.    History of Present Illness  58-year-old female presents to discuss EGD results.  She reports frequent upper abdominal pain described as soreness reports frequent heartburn and nausea but denies any recent vomiting.  Denies any dysphagia.  States her bowel habits have not changed and they've very frequently.  Denies any melena or hematochezia.  EGD was completed 10/19/18 and noted mildly severe esophagitis, gastritis normal duodenum.  No ulcers were seen.  Antral biopsy notes mild chronic gastritis, negative for H. pylori.  Distal esophagus biopsy noted segments of mildly inflamed stratified squamous epithelium.  She has gastroparesis followed by Dr. Fong and states she takes Reglan as needed.  Plan; encourage patient small frequent meals due to gastroparesis and reflux disease.  Continue Protonix daily.  Return to GI office as needed.       The following portions of the patient's history were reviewed and updated as appropriate:   Past Medical History:   Diagnosis Date   • Acquired hypothyroidism    • Angina, class IV (CMS/HCC)    • Anxiety    • Benign paroxysmal positional vertigo    • Carpal tunnel syndrome    • Chronic pain    • Coronary atherosclerosis    • Depression    • Diabetes mellitus (CMS/HCC)     Type 2, controlled   • Diabetic polyneuropathy (CMS/HCC)    • Elevated cholesterol    • Female stress incontinence    • Gastroparesis    • GERD (gastroesophageal reflux disease)    • Hashimoto's thyroiditis    • Hyperlipidemia    • Hypertension    • Low back pain    • Malaise and fatigue    • Multiple joint pain    • Obesity     Refuses to be weighed   • Otalgia     Both   • Perforation of tympanic membrane     Left   • Postoperative wound infection    • Vitamin D deficiency      Past Surgical History:   Procedure Laterality Date   • ABDOMINAL  SURGERY     • ANGIOPLASTY      coronary   • BREAST BIOPSY Right    • CARDIAC CATHETERIZATION     • CARDIAC CATHETERIZATION N/A 2017    Procedure: Right Heart Cath;  Surgeon: Can Kwon MD PhD;  Location: [facility] CATH INVASIVE LOCATION;  Service:    • CARPAL TUNNEL RELEASE     • CHOLECYSTECTOMY     • CORONARY ARTERY BYPASS GRAFT     • ENDOSCOPY N/A 10/19/2018    Procedure: ESOPHAGOGASTRODUODENOSCOPY possible dilation;  Surgeon: Julián Maldonado MD;  Location: [facility] ENDOSCOPY;  Service: Gastroenterology   • ENDOSCOPY AND COLONOSCOPY     • FOOT SURGERY      Toes   • GASTRIC BANDING      Revision, laparoscopic   • HYSTERECTOMY     • MOUTH SURGERY     • SALPINGO OOPHORECTOMY     • SHOULDER SURGERY     • TRANSESOPHAGEAL ECHOCARDIOGRAM (LAMONTE)      With color flow     Family History   Problem Relation Age of Onset   • Diabetes Other    • Heart disease Other    • Hypertension Other    • Heart disease Mother    • Stroke Mother    • Hypertension Mother    • Diabetes Sister    • Heart disease Sister    • Hypertension Sister    • Heart disease Sister    • Diabetes Sister    • Hypertension Sister    • Diabetes Sister    • Diabetes Sister    • Diabetes Sister    • Diabetes Sister      OB History      Para Term  AB Living    0 0 0 0 0 0    SAB TAB Ectopic Molar Multiple Live Births    0 0 0   0          Current Outpatient Prescriptions   Medication Sig Dispense Refill   • ARIPiprazole (ABILIFY) 15 MG tablet TAKE 1 TABLET BY MOUTH DAILY. 30 tablet 5   • aspirin 81 MG chewable tablet Chew 81 mg daily.     • atorvastatin (LIPITOR) 40 MG tablet TAKE 1 TABLET BY MOUTH EVERY NIGHT. 90 tablet 1   • BD SHARPS CONTAINER HOME misc 1 each Take As Directed. 1 each 0   • Blood Glucose Monitoring Suppl (ONE TOUCH ULTRA MINI) w/Device kit USE AS DIRECTED TO CHECK BLOOD SUGAR 1 each 0   • Calcium Citrate-Vitamin D (CITRACAL/VITAMIN D) 250-200 MG-UNIT tablet Take 2 tablets by mouth 2 (two) times a day.     •  clopidogrel (PLAVIX) 75 MG tablet TAKE 1 TABLET BY MOUTH DAILY. 90 tablet 3   • cyanocobalamin 1000 MCG/ML injection INJECT 1ML AS DIRECTED EVERY 28 (TWENTY-EIGHT) DAYS. 1 mL 0   • furosemide (LASIX) 40 MG tablet Take 1 tablet by mouth Daily. 90 tablet 3   • gabapentin (NEURONTIN) 400 MG capsule TAKE 1 CAPSULE BY MOUTH THREE TIMES A DAY 90 capsule 2   • GLUCAGON EMERGENCY 1 MG injection USE AS DIRECTED AS NEEDED 1 kit 0   • glucose blood (ONE TOUCH ULTRA TEST) test strip 1 each by Other route 4 (Four) Times a Day. Use as instructed 400 each 1   • hydrochlorothiazide (MICROZIDE) 12.5 MG capsule TAKE 1 CAPSULE BY MOUTH DAILY. 90 capsule 0   • HYDROcodone-acetaminophen (NORCO) 7.5-325 MG per tablet Take 1 tablet by mouth Every 4 (Four) Hours As Needed for Moderate Pain . 180 tablet 0   • insulin aspart (NOVOLOG FLEXPEN) 100 UNIT/ML solution pen-injector sc pen 60 units qac tid 18 pen 11   • Insulin Glargine (BASAGLAR KWIKPEN) 100 UNIT/ML injection pen 100 units q hs 5 pen 5   • Insulin Pen Needle (B-D ULTRAFINE III SHORT PEN) 31G X 8 MM misc Inject 1 each into the shoulder, thigh, or buttocks 4 (Four) Times a Day. use as directed 150 each 11   • LORazepam (ATIVAN) 0.5 MG tablet Take 1 tablet by mouth Daily As Needed for Anxiety. Last prescription 10 tablet 0   • meclizine (ANTIVERT) 25 MG tablet Take 1 tablet by mouth 3 (Three) Times a Day As Needed for dizziness. 90 tablet 6   • metoclopramide (REGLAN) 10 MG tablet Take 1 tablet by mouth 4 (Four) Times a Day Before Meals & at Bedtime. (Patient taking differently: Take 10 mg by mouth 4 (Four) Times a Day As Needed.) 30 tablet 0   • metoprolol succinate XL (TOPROL-XL) 25 MG 24 hr tablet TAKE 1 TABLET BY MOUTH DAILY. 31 tablet 6   • mirtazapine (REMERON) 45 MG tablet TAKE 1 TABLET BY MOUTH EVERY NIGHT. 90 tablet 1   • ondansetron (ZOFRAN) 8 MG tablet Take 1 tablet by mouth Every 8 (Eight) Hours. (Patient taking differently: Take 8 mg by mouth Every 8 (Eight) Hours As  "Needed.) 90 tablet 3   • ONE TOUCH ULTRA TEST test strip USE TO TEST SUGAR 4 TIMES A  each 3   • pantoprazole (PROTONIX) 40 MG EC tablet TAKE 1 TABLET BY MOUTH DAILY. 90 tablet 2   • Syringe/Needle, Disp, (LUER LOCK SAFETY SYRINGES) 25G X 5/8\" 3 ML misc 1 each Daily. 100 each 0   • venlafaxine XR (EFFEXOR-XR) 150 MG 24 hr capsule TAKE ONE CAPSULE BY MOUTH TWICE A DAY FOR DEPRESSION 180 capsule 3   • vitamin D (ERGOCALCIFEROL) 79155 units capsule capsule TAKE ONE CAPSULE BY MOUTH EVERY SUNDAY 12 capsule 2     Current Facility-Administered Medications   Medication Dose Route Frequency Provider Last Rate Last Dose   • cyanocobalamin injection 1,000 mcg  1,000 mcg Intramuscular Q28 Days Francisca Fong MD   1,000 mcg at 08/02/18 0807     Allergies   Allergen Reactions   • Seroquel [Quetiapine Fumarate] Anaphylaxis   • Avandia [Rosiglitazone] Swelling   • Morphine And Related Hallucinations     If patient takes too much     Social History     Social History   • Marital status:      Social History Main Topics   • Smoking status: Never Smoker   • Smokeless tobacco: Never Used   • Alcohol use No   • Drug use: No   • Sexual activity: Defer      Comment:      Other Topics Concern   • Not on file       Review of Systems  Review of Systems   Constitutional: Negative for activity change, appetite change, chills, diaphoresis, fatigue, fever and unexpected weight change.   HENT: Negative for sore throat and trouble swallowing.    Respiratory: Negative for shortness of breath.    Gastrointestinal: Positive for abdominal pain and nausea. Negative for abdominal distention, anal bleeding, blood in stool, constipation, diarrhea, rectal pain and vomiting.   Musculoskeletal: Negative for arthralgias.   Skin: Negative for pallor.   Neurological: Negative for light-headedness.        /74 (BP Location: Right arm)   Pulse 92   Ht 172.7 cm (68\")   Wt 120 kg (264 lb)   SpO2 96%   BMI 40.14 kg/m² "     Objective    Physical Exam   Constitutional: She is oriented to person, place, and time. She appears well-developed and well-nourished. She is cooperative. No distress.   HENT:   Head: Normocephalic and atraumatic.   Neck: Normal range of motion. Neck supple. No thyromegaly present.   Cardiovascular: Normal rate, regular rhythm and normal heart sounds.    Pulmonary/Chest: Effort normal and breath sounds normal. She has no wheezes. She has no rhonchi. She has no rales.   Abdominal: Soft. Normal appearance and bowel sounds are normal. She exhibits no distension. There is no hepatosplenomegaly. There is tenderness in the epigastric area. There is no rigidity and no guarding. No hernia.   Lymphadenopathy:     She has no cervical adenopathy.   Neurological: She is alert and oriented to person, place, and time.   Skin: Skin is warm, dry and intact. No rash noted. No pallor.   Psychiatric: She has a normal mood and affect. Her speech is normal.     Admission on 10/19/2018, Discharged on 10/19/2018   Component Date Value Ref Range Status   • Glucose 10/19/2018 71  70 - 130 mg/dL Final    [id number] [id number]   • Case Report 10/19/2018    Final                    Value:Surgical Pathology Report                         Case: [id number]                                  Authorizing Provider:  Julián Maldonado MD        Collected:           10/19/2018 01:57 PM          Ordering Location:     [facility]             Received:            10/19/2018 03:16 PM                                 [facility]                                                     Pathologist:           Kilo Marroquin MD                                                        Specimens:   1) - Gastric, Antrum                                                                                2) - Esophagus, Distal                                                                    • Final Diagnosis  10/19/2018    Final                    Value:This result contains rich text formatting which cannot be displayed here.   • Gross Description 10/19/2018    Final                    Value:This result contains rich text formatting which cannot be displayed here.     Assessment/Plan      1. Upper abdominal pain    2. GERD without esophagitis    .       Orders placed during this encounter include:  No orders of the defined types were placed in this encounter.      * Surgery not found *    Review and/or summary of lab tests, radiology, procedures, medications. Review and summary of old records and obtaining of history. The risks and benefits of my recommendations, as well as other treatment options were discussed with the patient today. Questions were answered.    No orders of the defined types were placed in this encounter.      Follow-up: Return if symptoms worsen or fail to improve.          This document has been electronically signed by BEBE Leach on October 30, 2018 11:21 AM             Results for orders placed or performed during the hospital encounter of 10/19/18   Tissue Pathology Exam   Result Value Ref Range    Case Report       Surgical Pathology Report                         Case: [id number]                                  Authorizing Provider:  Julián Maldonado MD        Collected:           10/19/2018 01:57 PM          Ordering Location:     [facility]             Received:            10/19/2018 03:16 PM                                 [facility]                                                     Pathologist:           Kilo Marroquin MD                                                        Specimens:   1) - Gastric, Antrum                                                                                2) - Esophagus, Distal                                                                     Final Diagnosis       1.  GASTRIC ANTRUM, MUCOSAL BIOPSY:   MILD CHRONIC GASTRITIS.    NO EVIDENCE OF HELICOBACTER PYLORI.     2.  DISTAL ESOPHAGUS, MUCOSAL BIOPSY:   SEGMENTS OF MILDLY INFLAMED STRATIFIED SQUAMOUS EPITHELIUM.        Gross Description       In 2 containers, each of these show mucosal biopsies measuring up to 0.3 cm in greatest dimension.  Embedded accordingly:  1A antrum, 2A distal esophagus.        POC Glucose Once   Result Value Ref Range    Glucose 71 70 - 130 mg/dL   Results for orders placed or performed in visit on 07/27/18   POC Glycosylated Hemoglobin (Hb A1C)   Result Value Ref Range    Hemoglobin A1C 6.4 %   Results for orders placed or performed during the hospital encounter of 07/21/18   Gold Top - SST   Result Value Ref Range    Extra Tube Hold for add-ons.    Gold Top - SST   Result Value Ref Range    Extra Tube Hold for add-ons.    Green Top (Gel)   Result Value Ref Range    Extra Tube Hold for add-ons.    Green Top (Gel)   Result Value Ref Range    Extra Tube Hold for add-ons.    Urinalysis With Culture If Indicated - Urine, Clean Catch   Result Value Ref Range    Color, UA Yellow Yellow, Straw, Dark Yellow, Kristel    Appearance, UA Clear Clear    pH, UA 6.0 5.0 - 9.0    Specific [place], UA 1.015 1.003 - 1.030    Glucose, UA Negative Negative    Ketones, UA Negative Negative    Bilirubin, UA Negative Negative    Blood, UA Negative Negative    Protein, UA Trace (A) Negative    Leuk Esterase, UA Negative Negative    Nitrite, UA Negative Negative    Urobilinogen, UA 1.0 E.U./dL 0.2 - 1.0 E.U./dL   Respiratory Panel, PCR - Swab, Nasopharynx   Result Value Ref Range    ADENOVIRUS, PCR Not Detected Not Detected    Coronavirus 229E Not Detected Not Detected    Coronavirus HKU1 Not Detected Not Detected    Coronavirus NL63 Not Detected Not Detected    Coronavirus OC43 Not Detected Not Detected    Human Metapneumovirus Not Detected Not Detected    Human Rhinovirus/Enterovirus Not Detected Not Detected    Influenza B PCR Not Detected Not Detected    Parainfluenza Virus 1 Not  Detected Not Detected    Parainfluenza Virus 2 Not Detected Not Detected    Parainfluenza Virus 3 Not Detected Not Detected    Parainfluenza Virus 4 Not Detected Not Detected    Bordetella pertussis pcr Not Detected Not Detected    Influenza A H1 2009 PCR Not Detected Not Detected    Chlamydophila pneumoniae PCR Not Detected Not Detected    Mycoplasma pneumo by PCR Not Detected Not Detected    Influenza A PCR Not Detected Not Detected    Influenza A H3 Not Detected Not Detected    Influenza A H1 Not Detected Not Detected    RSV, PCR Not Detected Not Detected   [facility] (IgG / M)   Result Value Ref Range    RMSF IgG Negative Negative    RMSF IgM 0.13 0.00 - 0.89 index   Babesia Microti Antibody Panel   Result Value Ref Range    Babesia microti IgM <1:10 Neg:<1:10    Babesia microti IgG <1:10 Neg:<1:10   CBC Auto Differential   Result Value Ref Range    WBC 10.00 (H) 3.20 - 9.80 10*3/mm3    RBC 4.00 3.77 - 5.16 10*6/mm3    Hemoglobin 11.1 (L) 12.0 - 15.5 g/dL    Hematocrit 34.3 (L) 35.0 - 45.0 %    MCV 85.8 80.0 - 98.0 fL    MCH 27.8 26.5 - 34.0 pg    MCHC 32.4 31.4 - 36.0 g/dL    RDW 14.4 11.5 - 14.5 %    RDW-SD 45.0 36.4 - 46.3 fl    MPV 9.4 8.0 - 12.0 fL    Platelets 372 150 - 450 10*3/mm3    Neutrophil % 62.0 37.0 - 80.0 %    Lymphocyte % 25.2 10.0 - 50.0 %    Monocyte % 7.3 0.0 - 12.0 %    Eosinophil % 4.9 0.0 - 7.0 %    Basophil % 0.2 0.0 - 2.0 %    Immature Grans % 0.4 0.0 - 0.5 %    Neutrophils, Absolute 6.20 2.00 - 8.60 10*3/mm3    Lymphocytes, Absolute 2.52 0.60 - 4.20 10*3/mm3    Monocytes, Absolute 0.73 0.00 - 0.90 10*3/mm3    Eosinophils, Absolute 0.49 0.00 - 0.70 10*3/mm3    Basophils, Absolute 0.02 0.00 - 0.20 10*3/mm3    Immature Grans, Absolute 0.04 (H) 0.00 - 0.02 10*3/mm3   CBC Auto Differential   Result Value Ref Range    WBC 11.56 (H) 3.20 - 9.80 10*3/mm3    RBC 4.44 3.77 - 5.16 10*6/mm3    Hemoglobin 12.3 12.0 - 15.5 g/dL    Hematocrit 38.7 35.0 - 45.0 %    MCV 87.2 80.0 - 98.0 fL    MCH  27.7 26.5 - 34.0 pg    MCHC 31.8 31.4 - 36.0 g/dL    RDW 14.7 (H) 11.5 - 14.5 %    RDW-SD 46.9 (H) 36.4 - 46.3 fl    MPV 10.1 8.0 - 12.0 fL    Platelets 389 150 - 450 10*3/mm3    Neutrophil % 62.5 37.0 - 80.0 %    Lymphocyte % 26.8 10.0 - 50.0 %    Monocyte % 6.5 0.0 - 12.0 %    Eosinophil % 3.6 0.0 - 7.0 %    Basophil % 0.3 0.0 - 2.0 %    Immature Grans % 0.3 0.0 - 0.5 %    Neutrophils, Absolute 7.22 2.00 - 8.60 10*3/mm3    Lymphocytes, Absolute 3.10 0.60 - 4.20 10*3/mm3    Monocytes, Absolute 0.75 0.00 - 0.90 10*3/mm3    Eosinophils, Absolute 0.42 0.00 - 0.70 10*3/mm3    Basophils, Absolute 0.04 0.00 - 0.20 10*3/mm3    Immature Grans, Absolute 0.03 (H) 0.00 - 0.02 10*3/mm3   CBC Auto Differential   Result Value Ref Range    WBC 12.16 (H) 3.20 - 9.80 10*3/mm3    RBC 4.62 3.77 - 5.16 10*6/mm3    Hemoglobin 12.9 12.0 - 15.5 g/dL    Hematocrit 38.7 35.0 - 45.0 %    MCV 83.8 80.0 - 98.0 fL    MCH 27.9 26.5 - 34.0 pg    MCHC 33.3 31.4 - 36.0 g/dL    RDW 14.3 11.5 - 14.5 %    RDW-SD 43.6 36.4 - 46.3 fl    MPV 9.7 8.0 - 12.0 fL    Platelets 418 150 - 450 10*3/mm3    Neutrophil % 72.7 37.0 - 80.0 %    Lymphocyte % 17.8 10.0 - 50.0 %    Monocyte % 6.4 0.0 - 12.0 %    Eosinophil % 2.7 0.0 - 7.0 %    Basophil % 0.2 0.0 - 2.0 %    Immature Grans % 0.2 0.0 - 0.5 %    Neutrophils, Absolute 8.83 (H) 2.00 - 8.60 10*3/mm3    Lymphocytes, Absolute 2.16 0.60 - 4.20 10*3/mm3    Monocytes, Absolute 0.78 0.00 - 0.90 10*3/mm3    Eosinophils, Absolute 0.33 0.00 - 0.70 10*3/mm3    Basophils, Absolute 0.03 0.00 - 0.20 10*3/mm3    Immature Grans, Absolute 0.03 (H) 0.00 - 0.02 10*3/mm3     *Note: Due to a large number of results and/or encounters for the requested time period, some results have not been displayed. A complete set of results can be found in Results Review.

## 2018-10-30 NOTE — PATIENT INSTRUCTIONS
Abdominal Pain, Adult  Abdominal pain can be caused by many things. Often, abdominal pain is not serious and it gets better with no treatment or by being treated at home. However, sometimes abdominal pain is serious. Your health care provider will do a medical history and a physical exam to try to determine the cause of your abdominal pain.  Follow these instructions at home:  · Take over-the-counter and prescription medicines only as told by your health care provider. Do not take a laxative unless told by your health care provider.  · Drink enough fluid to keep your urine clear or pale yellow.  · Watch your condition for any changes.  · Keep all follow-up visits as told by your health care provider. This is important.  Contact a health care provider if:  · Your abdominal pain changes or gets worse.  · You are not hungry or you lose weight without trying.  · You are constipated or have diarrhea for more than 2-3 days.  · You have pain when you urinate or have a bowel movement.  · Your abdominal pain wakes you up at night.  · Your pain gets worse with meals, after eating, or with certain foods.  · You are throwing up and cannot keep anything down.  · You have a fever.  Get help right away if:  · Your pain does not go away as soon as your health care provider told you to expect.  · You cannot stop throwing up.  · Your pain is only in areas of the abdomen, such as the right side or the left lower portion of the abdomen.  · You have bloody or black stools, or stools that look like tar.  · You have severe pain, cramping, or bloating in your abdomen.  · You have signs of dehydration, such as:  ? Dark urine, very little urine, or no urine.  ? Cracked lips.  ? Dry mouth.  ? Sunken eyes.  ? Sleepiness.  ? Weakness.  This information is not intended to replace advice given to you by your health care provider. Make sure you discuss any questions you have with your health care provider.  Document Released: 09/27/2006 Document  Revised: 07/07/2017 Document Reviewed: 05/31/2017  [employer] Interactive Patient Education © 2018 [employer]

## 2018-10-31 RX ORDER — HYDROCODONE BITARTRATE AND ACETAMINOPHEN 7.5; 325 MG/1; MG/1
1 TABLET ORAL EVERY 4 HOURS PRN
Qty: 180 TABLET | Refills: 0 | Status: SHIPPED | OUTPATIENT
Start: 2018-10-31 | End: 2018-11-28 | Stop reason: SDUPTHER

## 2018-11-02 ENCOUNTER — OFFICE VISIT (OUTPATIENT)
Dept: FAMILY MEDICINE CLINIC | Facility: CLINIC | Age: 58
End: 2018-11-02

## 2018-11-02 VITALS
DIASTOLIC BLOOD PRESSURE: 78 MMHG | HEIGHT: 68 IN | SYSTOLIC BLOOD PRESSURE: 130 MMHG | OXYGEN SATURATION: 98 % | HEART RATE: 84 BPM

## 2018-11-02 DIAGNOSIS — I10 ESSENTIAL HYPERTENSION: ICD-10-CM

## 2018-11-02 DIAGNOSIS — M25.50 CHRONIC PAIN OF MULTIPLE JOINTS: Primary | ICD-10-CM

## 2018-11-02 DIAGNOSIS — IMO0002 UNCONTROLLED TYPE 2 DIABETES MELLITUS WITH NEUROLOGIC COMPLICATION, WITH LONG-TERM CURRENT USE OF INSULIN: ICD-10-CM

## 2018-11-02 DIAGNOSIS — Z23 NEED FOR PNEUMOCOCCAL VACCINE: ICD-10-CM

## 2018-11-02 DIAGNOSIS — F41.1 GENERALIZED ANXIETY DISORDER: ICD-10-CM

## 2018-11-02 DIAGNOSIS — E53.8 CYANOCOBALAMIN DEFICIENCY: ICD-10-CM

## 2018-11-02 DIAGNOSIS — G89.29 CHRONIC PAIN OF MULTIPLE JOINTS: Primary | ICD-10-CM

## 2018-11-02 PROCEDURE — 90732 PPSV23 VACC 2 YRS+ SUBQ/IM: CPT | Performed by: FAMILY MEDICINE

## 2018-11-02 PROCEDURE — 99214 OFFICE O/P EST MOD 30 MIN: CPT | Performed by: FAMILY MEDICINE

## 2018-11-02 PROCEDURE — 90471 IMMUNIZATION ADMIN: CPT | Performed by: FAMILY MEDICINE

## 2018-11-02 PROCEDURE — 96372 THER/PROPH/DIAG INJ SC/IM: CPT | Performed by: FAMILY MEDICINE

## 2018-11-02 RX ORDER — LORAZEPAM 0.5 MG/1
0.5 TABLET ORAL DAILY PRN
Qty: 15 TABLET | Refills: 2 | Status: SHIPPED | OUTPATIENT
Start: 2018-11-02 | End: 2019-02-06 | Stop reason: SDUPTHER

## 2018-11-02 RX ADMIN — CYANOCOBALAMIN 1000 MCG: 1000 INJECTION, SOLUTION INTRAMUSCULAR; SUBCUTANEOUS at 08:17

## 2018-11-02 NOTE — PROGRESS NOTES
Subjective   Chief Complaint   Patient presents with   • Follow-up     3 MONTH    • Pain     meds already sent in    • B12 Injection     Ariana Martinez is a 58 y.o. female.   Follow-up (3 MONTH ); Pain (meds already sent in ); and B12 Injection    History of Present Illness     Generalized anxiety disorder - managed with ativan PRN    Gastroparesis -  Managed with reglan    Obesity - patient has been counseled by numerous providers on the significance of weight loss  Refused to be weighed today    Diabetic peripheral neuropathy - confirmed by EMG with Dr Mansfield  Pain managed with gabapentin    Diabetes - managed with insulin  Followed by endocrinology  Average readings of FBG levels are 100-140  Currently prescribed novolog, tresiba  Has previously failed metformin, bydureon, jardiance, januvia  Scheduled for diabetic eye exam - Dr England    Chronic back and joint pain - controlled with norco  uds up to date    Hypertension - blood pressure currently managed with hctz, lasix, toprol    b12 deficiency - managed with monthly injections    Scheduled next week for disability paperwork    Asking for pneumococcal vaccine    The following portions of the patient's history were reviewed and updated as appropriate: allergies, current medications, past family history, past medical history, past social history, past surgical history and problem list.    Review of Systems   Constitutional: Negative for appetite change, chills, fatigue and fever.   HENT: Negative for congestion, ear pain, rhinorrhea and sore throat.    Eyes: Negative for pain.   Respiratory: Negative for cough and shortness of breath.    Cardiovascular: Negative for chest pain and palpitations.   Gastrointestinal: Negative for abdominal pain, constipation, diarrhea and nausea.   Genitourinary: Negative for dysuria.   Musculoskeletal: Negative for back pain, joint swelling and neck pain.   Skin: Negative for rash.   Neurological: Negative for dizziness and  "headaches.       Objective   /78   Pulse 84   Ht 172.7 cm (67.99\")   SpO2 98%   Physical Exam   Constitutional: She is oriented to person, place, and time. She appears well-developed and well-nourished.   HENT:   Head: Normocephalic and atraumatic.   Eyes: Pupils are equal, round, and reactive to light.   Neck: Normal range of motion. Neck supple.   Cardiovascular: Normal rate, regular rhythm and normal heart sounds.    Pulmonary/Chest: Effort normal and breath sounds normal. No respiratory distress. She has no wheezes. She has no rales.   Abdominal: Soft. Bowel sounds are normal.   Musculoskeletal: Normal range of motion.   Neurological: She is alert and oriented to person, place, and time.   Skin: Skin is warm and dry.   Psychiatric: She has a normal mood and affect.   Nursing note and vitals reviewed.      Assessment/Plan   Problems Addressed this Visit        Cardiovascular and Mediastinum    Hypertension       Digestive    Cyanocobalamin deficiency       Endocrine    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, with long-term current use of insulin (CMS/[facility])       Nervous and Auditory    Chronic pain of multiple joints - Primary      Other Visit Diagnoses     Generalized anxiety disorder        Relevant Medications    LORazepam (ATIVAN) 0.5 MG tablet    Need for pneumococcal vaccine        Relevant Orders    Pneumococcal Polysaccharide Vaccine 23-Valent (PPSV23) Greater Than or Equal To 3yo Subcutaneous / IM (Completed)        B12 IM today    Pneumococcal vaccine today    The patient has read and signed the [facility] Controlled Substance Contract.  I will continue to see patient for regular follow up appointments.  They are well controlled on their medication.  LESTER is updated every 3 months. The patient is aware of the potential for addiction and dependence.  lester [id number]  Refilled ativan - disp#15 only per month to be used sparingly for anxiety or panic " attacks    Patient to schedule a diabetic eye exam    Due for diabetic foot exam in December    Recheck in 4 weeks

## 2018-11-07 ENCOUNTER — OFFICE VISIT (OUTPATIENT)
Dept: FAMILY MEDICINE CLINIC | Facility: CLINIC | Age: 58
End: 2018-11-07

## 2018-11-07 VITALS
TEMPERATURE: 98.2 F | SYSTOLIC BLOOD PRESSURE: 138 MMHG | OXYGEN SATURATION: 98 % | DIASTOLIC BLOOD PRESSURE: 76 MMHG | HEART RATE: 90 BPM | HEIGHT: 68 IN | BODY MASS INDEX: 40.01 KG/M2 | WEIGHT: 264 LBS

## 2018-11-07 DIAGNOSIS — F41.1 GAD (GENERALIZED ANXIETY DISORDER): Primary | ICD-10-CM

## 2018-11-07 DIAGNOSIS — E11.42 DIABETIC PERIPHERAL NEUROPATHY ASSOCIATED WITH TYPE 2 DIABETES MELLITUS (HCC): ICD-10-CM

## 2018-11-07 DIAGNOSIS — K31.84 GASTROPARESIS: ICD-10-CM

## 2018-11-07 DIAGNOSIS — F33.1 DEPRESSION, MAJOR, RECURRENT, MODERATE (HCC): ICD-10-CM

## 2018-11-07 PROBLEM — F32.A DEPRESSION: Status: RESOLVED | Noted: 2018-09-11 | Resolved: 2018-11-07

## 2018-11-07 PROCEDURE — 99212 OFFICE O/P EST SF 10 MIN: CPT | Performed by: FAMILY MEDICINE

## 2018-11-07 NOTE — PROGRESS NOTES
"Subjective   Chief Complaint   Patient presents with   • Disability Paperwork     Ariana Martinez is a 58 y.o. female.   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  She is currently medically managed well for anxiety and depression.  She additionally has gastroparesis, diabetes, diabetic peripheral neuropathy that hinders her from working long hours and even long periods on her feet.    The following portions of the patient's history were reviewed and updated as appropriate: allergies, current medications, past family history, past medical history, past social history, past surgical history and problem list.    Review of Systems   Constitutional: Negative for appetite change, chills, fatigue and fever.   HENT: Negative for congestion, ear pain, rhinorrhea and sore throat.    Eyes: Negative for pain.   Respiratory: Negative for cough and shortness of breath.    Cardiovascular: Negative for chest pain and palpitations.   Gastrointestinal: Negative for abdominal pain, constipation, diarrhea and nausea.   Genitourinary: Negative for dysuria.   Musculoskeletal: Negative for back pain, joint swelling and neck pain.   Skin: Negative for rash.   Neurological: Negative for dizziness and headaches.       Objective   /76   Pulse 90   Temp 98.2 °F (36.8 °C)   Ht 172.7 cm (67.99\")   Wt 120 kg (264 lb)   SpO2 98%   BMI 40.15 kg/m²   Physical Exam   Constitutional: She is oriented to person, place, and time. She appears well-developed and well-nourished.   HENT:   Head: Normocephalic and atraumatic.   Eyes: Pupils are equal, round, and reactive to light.   Cardiovascular: Normal rate, regular rhythm and " normal heart sounds.    Pulmonary/Chest: Effort normal and breath sounds normal. No respiratory distress. She has no wheezes. She has no rales.   Neurological: She is alert and oriented to person, place, and time.   Skin: Skin is warm and dry.   Psychiatric: She has a normal mood and affect.   Nursing note and vitals reviewed.      Assessment/Plan   Problems Addressed this Visit        Digestive    Gastroparesis       Endocrine    Diabetic peripheral neuropathy associated with type 2 diabetes mellitus (CMS/HCC)       Other    MAURICIO (generalized anxiety disorder) - Primary    Depression, major, recurrent, moderate (CMS/HCC)        Paperwork filled out with patient  Reviewed previous paperwork filled out from 2016  Will mail to the patient once completed  Recheck as scheduled

## 2018-11-15 RX ORDER — BLOOD-GLUCOSE METER
1 KIT MISCELLANEOUS AS NEEDED
Qty: 1 EACH | Refills: 0 | Status: SHIPPED | OUTPATIENT
Start: 2018-11-15 | End: 2018-12-11 | Stop reason: SDUPTHER

## 2018-11-16 RX ORDER — METOPROLOL SUCCINATE 25 MG/1
25 TABLET, EXTENDED RELEASE ORAL DAILY
Qty: 31 TABLET | Refills: 6 | Status: SHIPPED | OUTPATIENT
Start: 2018-11-16 | End: 2019-05-20 | Stop reason: SDUPTHER

## 2018-11-19 ENCOUNTER — OFFICE VISIT (OUTPATIENT)
Dept: PODIATRY | Facility: CLINIC | Age: 58
End: 2018-11-19

## 2018-11-19 VITALS — BODY MASS INDEX: 39.1 KG/M2 | WEIGHT: 258 LBS | OXYGEN SATURATION: 98 % | HEART RATE: 107 BPM | HEIGHT: 68 IN

## 2018-11-19 DIAGNOSIS — M79.672 LEFT FOOT PAIN: ICD-10-CM

## 2018-11-19 DIAGNOSIS — S92.352K DISPLACED FRACTURE OF FIFTH METATARSAL BONE, LEFT FOOT, SUBSEQUENT ENCOUNTER FOR FRACTURE WITH NONUNION: Primary | ICD-10-CM

## 2018-11-19 DIAGNOSIS — E11.42 DIABETIC POLYNEUROPATHY ASSOCIATED WITH TYPE 2 DIABETES MELLITUS (HCC): ICD-10-CM

## 2018-11-19 PROCEDURE — 99213 OFFICE O/P EST LOW 20 MIN: CPT | Performed by: PODIATRIST

## 2018-11-19 NOTE — PROGRESS NOTES
Ariana Luis Martinez  1960  58 y.o. female  PCP: Dr. Fong last seen 11/28/2016.  BS: 127 per patient     Patient presents today with complaint of pain and edema in her left foot. She states she had surgery 2 years ago had hardware placed in the left foot. PT states Urgent Care told her that her foot didn't heal properly from surgery. She states her pain is 7/10 today and gets so bad at times that she cannot walk on her foot.       11/19/2018    Chief Complaint   Patient presents with   • Left Foot - Pain           History of Present Illness    Ms Martinez is a 59 y/o diabetic female who presents for evaluation of left foot pain.  States the pain is been present for approximately 2 weeks.  She denies any trauma or injury.  She does have a history of left fifth metatarsal fracture which was repaired by Dr. Gallagher in 2016.  She states that the bottom of her left foot near her fifth metatarsal feels like she is walking on something.    Past Medical History:   Diagnosis Date   • Acquired hypothyroidism    • Angina, class IV (CMS/HCC)    • Anxiety    • Benign paroxysmal positional vertigo    • Carpal tunnel syndrome    • Chronic pain    • Coronary atherosclerosis    • Depression    • Diabetes mellitus (CMS/HCC)     Type 2, controlled   • Diabetic polyneuropathy (CMS/HCC)    • Elevated cholesterol    • Female stress incontinence    • Gastroparesis    • GERD (gastroesophageal reflux disease)    • Hashimoto's thyroiditis    • Hyperlipidemia    • Hypertension    • Low back pain    • Malaise and fatigue    • Multiple joint pain    • Obesity     Refuses to be weighed   • Otalgia     Both   • Perforation of tympanic membrane     Left   • Postoperative wound infection    • Vitamin D deficiency          Past Surgical History:   Procedure Laterality Date   • ABDOMINAL SURGERY     • ANGIOPLASTY      coronary   • BREAST BIOPSY Right    • CARDIAC CATHETERIZATION     • CARPAL TUNNEL RELEASE     • CHOLECYSTECTOMY     • CORONARY  ARTERY BYPASS GRAFT  2005   • ENDOSCOPY AND COLONOSCOPY     • FOOT SURGERY      Toes   • GASTRIC BANDING      Revision, laparoscopic   • HYSTERECTOMY     • MOUTH SURGERY     • SALPINGO OOPHORECTOMY     • SHOULDER SURGERY     • TRANSESOPHAGEAL ECHOCARDIOGRAM (LAMONTE)      With color flow         Family History   Problem Relation Age of Onset   • Diabetes Other    • Heart disease Other    • Hypertension Other    • Heart disease Mother    • Stroke Mother    • Hypertension Mother    • Diabetes Sister    • Heart disease Sister    • Hypertension Sister    • Heart disease Sister    • Diabetes Sister    • Hypertension Sister    • Diabetes Sister    • Diabetes Sister    • Diabetes Sister    • Diabetes Sister          Social History     Socioeconomic History   • Marital status:      Spouse name: Not on file   • Number of children: Not on file   • Years of education: Not on file   • Highest education level: Not on file   Social Needs   • Financial resource strain: Not on file   • Food insecurity - worry: Not on file   • Food insecurity - inability: Not on file   • Transportation needs - medical: Not on file   • Transportation needs - non-medical: Not on file   Occupational History   • Not on file   Tobacco Use   • Smoking status: Never Smoker   • Smokeless tobacco: Never Used   Substance and Sexual Activity   • Alcohol use: No   • Drug use: No   • Sexual activity: Defer     Comment:    Other Topics Concern   • Not on file   Social History Narrative   • Not on file         Current Outpatient Medications   Medication Sig Dispense Refill   • ARIPiprazole (ABILIFY) 15 MG tablet TAKE 1 TABLET BY MOUTH DAILY. 30 tablet 5   • aspirin 81 MG chewable tablet Chew 81 mg daily.     • atorvastatin (LIPITOR) 40 MG tablet TAKE 1 TABLET BY MOUTH EVERY NIGHT. 90 tablet 1   • BD SHARPS CONTAINER HOME misc 1 each Take As Directed. 1 each 0   • Blood Glucose Monitoring Suppl (ONE TOUCH ULTRA MINI) w/Device kit USE AS DIRECTED TO CHECK  BLOOD SUGAR 1 each 0   • Calcium Citrate-Vitamin D (CITRACAL/VITAMIN D) 250-200 MG-UNIT tablet Take 2 tablets by mouth 2 (two) times a day.     • clopidogrel (PLAVIX) 75 MG tablet TAKE 1 TABLET BY MOUTH DAILY. 90 tablet 3   • cyanocobalamin 1000 MCG/ML injection INJECT 1ML AS DIRECTED EVERY 28 (TWENTY-EIGHT) DAYS. 1 mL 0   • furosemide (LASIX) 40 MG tablet Take 1 tablet by mouth Daily. 90 tablet 3   • gabapentin (NEURONTIN) 400 MG capsule TAKE 1 CAPSULE BY MOUTH THREE TIMES A DAY 90 capsule 2   • GLUCAGON EMERGENCY 1 MG injection USE AS DIRECTED AS NEEDED 1 kit 0   • glucose blood (ONE TOUCH ULTRA TEST) test strip 1 each by Other route 4 (Four) Times a Day. Use as instructed 400 each 1   • glucose monitor monitoring kit 1 each As Needed (4 times daily). 1 each 0   • hydrochlorothiazide (MICROZIDE) 12.5 MG capsule TAKE 1 CAPSULE BY MOUTH DAILY. 90 capsule 0   • HYDROcodone-acetaminophen (NORCO) 7.5-325 MG per tablet Take 1 tablet by mouth Every 4 (Four) Hours As Needed for Moderate Pain . 180 tablet 0   • insulin aspart (NOVOLOG FLEXPEN) 100 UNIT/ML solution pen-injector sc pen 60 units qac tid 18 pen 11   • Insulin Glargine (BASAGLAR KWIKPEN) 100 UNIT/ML injection pen 100 units q hs 5 pen 5   • Insulin Pen Needle (B-D ULTRAFINE III SHORT PEN) 31G X 8 MM misc Inject 1 each into the shoulder, thigh, or buttocks 4 (Four) Times a Day. use as directed 150 each 11   • LORazepam (ATIVAN) 0.5 MG tablet Take 1 tablet by mouth Daily As Needed for Anxiety. 15 tablet 2   • meclizine (ANTIVERT) 25 MG tablet Take 1 tablet by mouth 3 (Three) Times a Day As Needed for dizziness. 90 tablet 6   • metoclopramide (REGLAN) 10 MG tablet Take 1 tablet by mouth 4 (Four) Times a Day Before Meals & at Bedtime. (Patient taking differently: Take 10 mg by mouth 4 (Four) Times a Day As Needed.) 30 tablet 0   • metoprolol succinate XL (TOPROL-XL) 25 MG 24 hr tablet TAKE 1 TABLET BY MOUTH DAILY. 31 tablet 6   • mirtazapine (REMERON) 45 MG tablet  "TAKE 1 TABLET BY MOUTH EVERY NIGHT. 90 tablet 1   • ondansetron (ZOFRAN) 8 MG tablet Take 1 tablet by mouth Every 8 (Eight) Hours. (Patient taking differently: Take 8 mg by mouth Every 8 (Eight) Hours As Needed.) 90 tablet 3   • ONE TOUCH ULTRA TEST test strip USE TO TEST SUGAR 4 TIMES A  each 3   • pantoprazole (PROTONIX) 40 MG EC tablet TAKE 1 TABLET BY MOUTH DAILY. 90 tablet 2   • Syringe/Needle, Disp, (LUER LOCK SAFETY SYRINGES) 25G X 5/8\" 3 ML misc 1 each Daily. 100 each 0   • venlafaxine XR (EFFEXOR-XR) 150 MG 24 hr capsule TAKE ONE CAPSULE BY MOUTH TWICE A DAY FOR DEPRESSION 180 capsule 3   • vitamin D (ERGOCALCIFEROL) 14257 units capsule capsule TAKE ONE CAPSULE BY MOUTH EVERY SUNDAY 12 capsule 2     Current Facility-Administered Medications   Medication Dose Route Frequency Provider Last Rate Last Dose   • cyanocobalamin injection 1,000 mcg  1,000 mcg Intramuscular Q28 Days Francisca Fong MD   1,000 mcg at 11/02/18 0817         OBJECTIVE    Pulse 107   Ht 172.7 cm (67.99\")   Wt 117 kg (258 lb)   SpO2 98%   BMI 39.24 kg/m²       Review of Systems   Constitutional:  Denies recent weight loss, weight gain, fever or chills, no change in exercise tolerance  Musculoskeletal: Foot pain. Hammertoe deformity.   Skin:  Dry skin.  Neurological:  Burning sensations to feet b/l.  Psychiatric/Behavioral: Denies depression    Physical Exam   Constitutional: she appears well-developed and well-nourished.   Psychiatric: she has a normal mood and affect. her   behavior is normal.      Lower Extremity Exam:  Vascular: DP pulses palpable 2+. PT not readily palpable  Negative hair growth.   Mild perimalleolar/left dorsal foot edema  Neuro: Protective sensation absent to foot, b/l. Reestablished at mid calf   DTRs intact  Vibratory sensation diminished.  Integument: No open wounds or lesions.  Diffuse xerosis b/l.  Webspaces c/d/i  Musculoskeletal: LE muscle strength 4/5.   Gait normal, in DM shoes  Semi-rigid " hammertoe deformity toes 2-5 b/l.  Nails 1-5 b/l wnl of length and thickness  Ankle ROM decreased without pain or crepitus  Resting calcaneal stance position in mild varus.  Mild pes cavus.  Tenderness palpation fifth metatarsal base, left        Procedures        ASSESSMENT AND PLAN    Ariana was seen today for pain.    Diagnoses and all orders for this visit:    Displaced fracture of fifth metatarsal bone, left foot, subsequent encounter for fracture with nonunion  -     CT FOOT LEFT WITHOUT CONTRAST; Future    Diabetic polyneuropathy associated with type 2 diabetes mellitus (CMS/[facility])    Left foot pain      -Comprehensive DM foot exam performed. Pt educated on importance of tight glucose control and daily foot checks.   -Radiographs reviewed.  Patient does have suspected delayed or partial union of prior fifth metatarsal fracture.  -Will obtain CT to further evaluate for osseous healing and possible hardware failure.  -Follow up following CT          This document has been electronically signed by Ignacio Lord DPM on November 19, 2018 12:44 PM     Ignacio Lord DPM  11/19/2018  12:44 PM

## 2018-11-27 ENCOUNTER — HOSPITAL ENCOUNTER (OUTPATIENT)
Dept: CT IMAGING | Facility: HOSPITAL | Age: 58
Discharge: HOME | End: 2018-11-27
Attending: PODIATRIST | Admitting: PODIATRIST

## 2018-11-27 DIAGNOSIS — S92.352K DISPLACED FRACTURE OF FIFTH METATARSAL BONE, LEFT FOOT, SUBSEQUENT ENCOUNTER FOR FRACTURE WITH NONUNION: ICD-10-CM

## 2018-11-27 PROCEDURE — 73700 CT LOWER EXTREMITY W/O DYE: CPT

## 2018-11-28 ENCOUNTER — APPOINTMENT (OUTPATIENT)
Dept: LAB | Facility: HOSPITAL | Age: 58
End: 2018-11-28

## 2018-11-28 DIAGNOSIS — M25.50 CHRONIC PAIN OF MULTIPLE JOINTS: ICD-10-CM

## 2018-11-28 DIAGNOSIS — E53.8 CYANOCOBALAMIN DEFICIENCY: ICD-10-CM

## 2018-11-28 DIAGNOSIS — G89.29 CHRONIC PAIN OF MULTIPLE JOINTS: ICD-10-CM

## 2018-11-28 LAB
ALBUMIN SERPL-MCNC: 4.2 G/DL (ref 3.4–4.8)
ALBUMIN/GLOB SERPL: 1.3 G/DL (ref 1.1–1.8)
ALP SERPL-CCNC: 149 U/L (ref 38–126)
ALT SERPL W P-5'-P-CCNC: 25 U/L (ref 9–52)
ANION GAP SERPL CALCULATED.3IONS-SCNC: 10 MMOL/L (ref 5–15)
AST SERPL-CCNC: 62 U/L (ref 14–36)
BASOPHILS # BLD AUTO: 0.02 10*3/MM3 (ref 0–0.2)
BASOPHILS NFR BLD AUTO: 0.2 % (ref 0–2)
BILIRUB SERPL-MCNC: 0.4 MG/DL (ref 0.2–1.3)
BUN BLD-MCNC: 14 MG/DL (ref 7–21)
BUN/CREAT SERPL: 16.5 (ref 7–25)
CALCIUM SPEC-SCNC: 9.1 MG/DL (ref 8.4–10.2)
CHLORIDE SERPL-SCNC: 99 MMOL/L (ref 95–110)
CO2 SERPL-SCNC: 28 MMOL/L (ref 22–31)
CREAT BLD-MCNC: 0.85 MG/DL (ref 0.5–1)
DEPRECATED RDW RBC AUTO: 45.1 FL (ref 36.4–46.3)
EOSINOPHIL # BLD AUTO: 0.37 10*3/MM3 (ref 0–0.7)
EOSINOPHIL NFR BLD AUTO: 3.2 % (ref 0–7)
ERYTHROCYTE [DISTWIDTH] IN BLOOD BY AUTOMATED COUNT: 14.4 % (ref 11.5–14.5)
GFR SERPL CREATININE-BSD FRML MDRD: 69 ML/MIN/1.73 (ref 51–120)
GLOBULIN UR ELPH-MCNC: 3.3 GM/DL (ref 2.3–3.5)
GLUCOSE BLD-MCNC: 232 MG/DL (ref 60–100)
HBA1C MFR BLD: 8.1 % (ref 4–5.6)
HCT VFR BLD AUTO: 42 % (ref 35–45)
HGB BLD-MCNC: 13.6 G/DL (ref 12–15.5)
IMM GRANULOCYTES # BLD: 0.03 10*3/MM3 (ref 0–0.02)
IMM GRANULOCYTES NFR BLD: 0.3 % (ref 0–0.5)
LYMPHOCYTES # BLD AUTO: 1.85 10*3/MM3 (ref 0.6–4.2)
LYMPHOCYTES NFR BLD AUTO: 15.9 % (ref 10–50)
MCH RBC QN AUTO: 27.8 PG (ref 26.5–34)
MCHC RBC AUTO-ENTMCNC: 32.4 G/DL (ref 31.4–36)
MCV RBC AUTO: 85.7 FL (ref 80–98)
MONOCYTES # BLD AUTO: 0.55 10*3/MM3 (ref 0–0.9)
MONOCYTES NFR BLD AUTO: 4.7 % (ref 0–12)
NEUTROPHILS # BLD AUTO: 8.79 10*3/MM3 (ref 2–8.6)
NEUTROPHILS NFR BLD AUTO: 75.7 % (ref 37–80)
PLATELET # BLD AUTO: 507 10*3/MM3 (ref 150–450)
PMV BLD AUTO: 9.4 FL (ref 8–12)
POTASSIUM BLD-SCNC: 3.2 MMOL/L (ref 3.5–5.1)
PROT SERPL-MCNC: 7.5 G/DL (ref 6.3–8.6)
RBC # BLD AUTO: 4.9 10*6/MM3 (ref 3.77–5.16)
SODIUM BLD-SCNC: 137 MMOL/L (ref 137–145)
WBC NRBC COR # BLD: 11.61 10*3/MM3 (ref 3.2–9.8)

## 2018-11-28 PROCEDURE — 36415 COLL VENOUS BLD VENIPUNCTURE: CPT | Performed by: NURSE PRACTITIONER

## 2018-11-28 PROCEDURE — 80053 COMPREHEN METABOLIC PANEL: CPT | Performed by: NURSE PRACTITIONER

## 2018-11-28 PROCEDURE — 83036 HEMOGLOBIN GLYCOSYLATED A1C: CPT | Performed by: NURSE PRACTITIONER

## 2018-11-28 PROCEDURE — 85025 COMPLETE CBC W/AUTO DIFF WBC: CPT | Performed by: NURSE PRACTITIONER

## 2018-11-28 RX ORDER — CYANOCOBALAMIN 1000 UG/ML
1000 INJECTION, SOLUTION INTRAMUSCULAR; SUBCUTANEOUS
Qty: 1 ML | Refills: 12 | Status: SHIPPED | OUTPATIENT
Start: 2018-11-28 | End: 2018-12-26 | Stop reason: SDUPTHER

## 2018-11-28 RX ORDER — HYDROCODONE BITARTRATE AND ACETAMINOPHEN 7.5; 325 MG/1; MG/1
1 TABLET ORAL EVERY 4 HOURS PRN
Qty: 180 TABLET | Refills: 0 | Status: SHIPPED | OUTPATIENT
Start: 2018-11-28 | End: 2018-12-26 | Stop reason: SDUPTHER

## 2018-11-30 ENCOUNTER — OFFICE VISIT (OUTPATIENT)
Dept: PODIATRY | Facility: CLINIC | Age: 58
End: 2018-11-30

## 2018-11-30 ENCOUNTER — OFFICE VISIT (OUTPATIENT)
Dept: ENDOCRINOLOGY | Facility: CLINIC | Age: 58
End: 2018-11-30

## 2018-11-30 VITALS — WEIGHT: 261 LBS | HEIGHT: 68 IN | BODY MASS INDEX: 39.56 KG/M2

## 2018-11-30 VITALS
HEIGHT: 68 IN | SYSTOLIC BLOOD PRESSURE: 124 MMHG | HEART RATE: 84 BPM | BODY MASS INDEX: 39.56 KG/M2 | DIASTOLIC BLOOD PRESSURE: 82 MMHG | WEIGHT: 261 LBS

## 2018-11-30 DIAGNOSIS — I10 ESSENTIAL HYPERTENSION: ICD-10-CM

## 2018-11-30 DIAGNOSIS — Q66.70 PES CAVUS: ICD-10-CM

## 2018-11-30 DIAGNOSIS — E11.42 DIABETIC POLYNEUROPATHY ASSOCIATED WITH TYPE 2 DIABETES MELLITUS (HCC): ICD-10-CM

## 2018-11-30 DIAGNOSIS — E03.9 HYPOTHYROIDISM, UNSPECIFIED TYPE: ICD-10-CM

## 2018-11-30 DIAGNOSIS — IMO0002 UNCONTROLLED TYPE 2 DIABETES MELLITUS WITH NEUROLOGIC COMPLICATION, WITH LONG-TERM CURRENT USE OF INSULIN: ICD-10-CM

## 2018-11-30 DIAGNOSIS — E78.2 MIXED HYPERLIPIDEMIA: Primary | ICD-10-CM

## 2018-11-30 DIAGNOSIS — E55.9 VITAMIN D DEFICIENCY: ICD-10-CM

## 2018-11-30 DIAGNOSIS — S92.352K DISPLACED FRACTURE OF FIFTH METATARSAL BONE, LEFT FOOT, SUBSEQUENT ENCOUNTER FOR FRACTURE WITH NONUNION: Primary | ICD-10-CM

## 2018-11-30 PROCEDURE — 99214 OFFICE O/P EST MOD 30 MIN: CPT | Performed by: NURSE PRACTITIONER

## 2018-11-30 PROCEDURE — 99213 OFFICE O/P EST LOW 20 MIN: CPT | Performed by: PODIATRIST

## 2018-11-30 RX ORDER — MELOXICAM 15 MG/1
15 TABLET ORAL DAILY
Qty: 30 TABLET | Refills: 0 | Status: SHIPPED | OUTPATIENT
Start: 2018-11-30 | End: 2019-08-19

## 2018-11-30 NOTE — PROGRESS NOTES
Ariana Luis Martinez  1960  58 y.o. female  PCP: Dr. Fong last seen 11/07/2018.  BS: 128 per patient     Patient presents to clinic today for a follow up on left foot pain and results from a CT scan.  11/30/2018      Chief Complaint   Patient presents with   • Left Foot - Follow-up           History of Present Illness    Ms Martinez is a 57 y/o diabetic female who presents for f/u evaluation of plantar lateral left foot pain.  Has history of open reduction internal fixation fifth metatarsal base fracture.  Has had CT scans since last visit.  States that pain is mildly improved but still significant with prolonged activity.    Past Medical History:   Diagnosis Date   • Acquired hypothyroidism    • Angina, class IV (CMS/HCC)    • Anxiety    • Benign paroxysmal positional vertigo    • Carpal tunnel syndrome    • Chronic pain    • Coronary atherosclerosis    • Depression    • Diabetes mellitus (CMS/HCC)     Type 2, controlled   • Diabetic polyneuropathy (CMS/HCC)    • Elevated cholesterol    • Female stress incontinence    • Gastroparesis    • GERD (gastroesophageal reflux disease)    • Hashimoto's thyroiditis    • Hyperlipidemia    • Hypertension    • Low back pain    • Malaise and fatigue    • Multiple joint pain    • Obesity     Refuses to be weighed   • Otalgia     Both   • Perforation of tympanic membrane     Left   • Postoperative wound infection    • Vitamin D deficiency          Past Surgical History:   Procedure Laterality Date   • ABDOMINAL SURGERY     • ANGIOPLASTY      coronary   • BREAST BIOPSY Right    • CARDIAC CATHETERIZATION     • CARPAL TUNNEL RELEASE     • CHOLECYSTECTOMY     • CORONARY ARTERY BYPASS GRAFT  2005   • ENDOSCOPY AND COLONOSCOPY     • FOOT SURGERY      Toes   • GASTRIC BANDING      Revision, laparoscopic   • HYSTERECTOMY     • MOUTH SURGERY     • SALPINGO OOPHORECTOMY     • SHOULDER SURGERY     • TRANSESOPHAGEAL ECHOCARDIOGRAM (LAMONTE)      With color flow         Family History   Problem  Relation Age of Onset   • Diabetes Other    • Heart disease Other    • Hypertension Other    • Heart disease Mother    • Stroke Mother    • Hypertension Mother    • Diabetes Sister    • Heart disease Sister    • Hypertension Sister    • Heart disease Sister    • Diabetes Sister    • Hypertension Sister    • Diabetes Sister    • Diabetes Sister    • Diabetes Sister    • Diabetes Sister          Social History     Socioeconomic History   • Marital status:      Spouse name: Not on file   • Number of children: Not on file   • Years of education: Not on file   • Highest education level: Not on file   Social Needs   • Financial resource strain: Not on file   • Food insecurity - worry: Not on file   • Food insecurity - inability: Not on file   • Transportation needs - medical: Not on file   • Transportation needs - non-medical: Not on file   Occupational History   • Not on file   Tobacco Use   • Smoking status: Never Smoker   • Smokeless tobacco: Never Used   Substance and Sexual Activity   • Alcohol use: No   • Drug use: No   • Sexual activity: Defer     Comment:    Other Topics Concern   • Not on file   Social History Narrative   • Not on file         Current Outpatient Medications   Medication Sig Dispense Refill   • ARIPiprazole (ABILIFY) 15 MG tablet TAKE 1 TABLET BY MOUTH DAILY. 30 tablet 5   • aspirin 81 MG chewable tablet Chew 81 mg daily.     • atorvastatin (LIPITOR) 40 MG tablet TAKE 1 TABLET BY MOUTH EVERY NIGHT. 90 tablet 1   • BD SHARPS CONTAINER HOME misc 1 each Take As Directed. 1 each 0   • Blood Glucose Monitoring Suppl (ONE TOUCH ULTRA MINI) w/Device kit USE AS DIRECTED TO CHECK BLOOD SUGAR 1 each 0   • Calcium Citrate-Vitamin D (CITRACAL/VITAMIN D) 250-200 MG-UNIT tablet Take 2 tablets by mouth 2 (two) times a day.     • clopidogrel (PLAVIX) 75 MG tablet TAKE 1 TABLET BY MOUTH DAILY. 90 tablet 3   • cyanocobalamin 1000 MCG/ML injection Inject 1 mL into the appropriate muscle as directed by  prescriber Every 28 (Twenty-Eight) Days. 1 mL 12   • furosemide (LASIX) 40 MG tablet Take 1 tablet by mouth Daily. 90 tablet 3   • gabapentin (NEURONTIN) 400 MG capsule TAKE 1 CAPSULE BY MOUTH THREE TIMES A DAY 90 capsule 2   • GLUCAGON EMERGENCY 1 MG injection USE AS DIRECTED AS NEEDED 1 kit 0   • glucose blood (ONE TOUCH ULTRA TEST) test strip 1 each by Other route 4 (Four) Times a Day. Use as instructed 400 each 1   • glucose monitor monitoring kit 1 each As Needed (4 times daily). 1 each 0   • hydrochlorothiazide (MICROZIDE) 12.5 MG capsule TAKE 1 CAPSULE BY MOUTH DAILY. 90 capsule 0   • HYDROcodone-acetaminophen (NORCO) 7.5-325 MG per tablet Take 1 tablet by mouth Every 4 (Four) Hours As Needed for Moderate Pain . 180 tablet 0   • insulin aspart (NOVOLOG FLEXPEN) 100 UNIT/ML solution pen-injector sc pen 60 units qac tid 18 pen 11   • Insulin Glargine (BASAGLAR KWIKPEN) 100 UNIT/ML injection pen 100 units q hs 5 pen 5   • Insulin Pen Needle (B-D ULTRAFINE III SHORT PEN) 31G X 8 MM misc Inject 1 each into the shoulder, thigh, or buttocks 4 (Four) Times a Day. use as directed 150 each 11   • LORazepam (ATIVAN) 0.5 MG tablet Take 1 tablet by mouth Daily As Needed for Anxiety. 15 tablet 2   • meclizine (ANTIVERT) 25 MG tablet Take 1 tablet by mouth 3 (Three) Times a Day As Needed for dizziness. 90 tablet 6   • metoclopramide (REGLAN) 10 MG tablet Take 1 tablet by mouth 4 (Four) Times a Day Before Meals & at Bedtime. (Patient taking differently: Take 10 mg by mouth 4 (Four) Times a Day As Needed.) 30 tablet 0   • metoprolol succinate XL (TOPROL-XL) 25 MG 24 hr tablet TAKE 1 TABLET BY MOUTH DAILY. 31 tablet 6   • mirtazapine (REMERON) 45 MG tablet TAKE 1 TABLET BY MOUTH EVERY NIGHT. 90 tablet 1   • ondansetron (ZOFRAN) 8 MG tablet Take 1 tablet by mouth Every 8 (Eight) Hours. (Patient taking differently: Take 8 mg by mouth Every 8 (Eight) Hours As Needed.) 90 tablet 3   • ONE TOUCH ULTRA TEST test strip USE TO TEST  "SUGAR 4 TIMES A  each 3   • pantoprazole (PROTONIX) 40 MG EC tablet TAKE 1 TABLET BY MOUTH DAILY. 90 tablet 2   • SITagliptin (JANUVIA) 100 MG tablet 100 mg by mouth  daily before breakfast 30 tablet 11   • Syringe/Needle, Disp, (LUER LOCK SAFETY SYRINGES) 25G X 5/8\" 3 ML misc 1 each Daily. 100 each 0   • venlafaxine XR (EFFEXOR-XR) 150 MG 24 hr capsule TAKE ONE CAPSULE BY MOUTH TWICE A DAY FOR DEPRESSION 180 capsule 3   • vitamin D (ERGOCALCIFEROL) 79980 units capsule capsule TAKE ONE CAPSULE BY MOUTH EVERY SUNDAY 12 capsule 2   • meloxicam (MOBIC) 15 MG tablet Take 1 tablet by mouth Daily. Take once daily. 30 tablet 0     Current Facility-Administered Medications   Medication Dose Route Frequency Provider Last Rate Last Dose   • cyanocobalamin injection 1,000 mcg  1,000 mcg Intramuscular Q28 Days Francisca Fong MD   1,000 mcg at 11/02/18 0817         OBJECTIVE    Ht 172.7 cm (68\")   Wt 118 kg (261 lb)   BMI 39.68 kg/m²       Review of Systems   Constitutional:  Denies recent weight loss, weight gain, fever or chills, no change in exercise tolerance  Musculoskeletal: Foot pain. Hammertoe deformity.   Skin:  Dry skin.  Neurological:  Burning sensations to feet b/l.  Psychiatric/Behavioral: Denies depression    Physical Exam   Constitutional: she appears well-developed and well-nourished.   Psychiatric: she has a normal mood and affect. her   behavior is normal.      Lower Extremity Exam:  Vascular: DP pulses palpable 2+. PT not readily palpable  Negative hair growth.   Mild perimalleolar/left dorsal foot edema  Neuro: Protective sensation absent to foot, b/l. Reestablished at mid calf   DTRs intact  Vibratory sensation diminished.  Integument: No open wounds or lesions.  Diffuse xerosis b/l.  Webspaces c/d/i  Musculoskeletal: LE muscle strength 4/5.   Gait normal, in DM shoes  Semi-rigid hammertoe deformity toes 2-5 b/l.  Nails 1-5 b/l wnl of length and thickness  Ankle ROM decreased without pain or " crepitus  Resting calcaneal stance position in mild varus.  Mild pes cavus.  Tenderness palpation fifth metatarsal base, left    CT left foot without contrast.     CLINICAL INDICATION: Fracture fifth metatarsal. Evaluate for  nonunion or delayed union      COMPARISON: Left foot November 19, 2018.        TECHNIQUE: Noncontrast helical scanning with axial and coronal  reformations. Soft tissue, lung, and bone windows reviewed.     This exam was performed according to our departmental  dose-optimization program, which includes automated exposure  control, adjustment of the mA and/or kV according to patient size  and/or use of iterative reconstruction technique.     Findings: There is a fracture of the proximal shaft of fifth  metatarsal stabilized with internal fixation by a longitudinal  surgical screw. The distal aspect of the surgical screw extends  out of the cortex of the midshaft in a dorsal fashion.     There is partial bony fusion across the fracture line especially  in the dorsal aspect. The majority however of the fracture is  unfused, delayed union.     IMPRESSION:  CONCLUSION: As above.     Electronically signed by:  Naveed Taylor MD  11/27/2018 5:19 PM CST  Workstation: [id number]    Procedures        ASSESSMENT AND PLAN    Ariana was seen today for follow-up.    Diagnoses and all orders for this visit:    Displaced fracture of fifth metatarsal bone, left foot, subsequent encounter for fracture with nonunion    Diabetic polyneuropathy associated with type 2 diabetes mellitus (CMS/HCC)    Pes cavus    Other orders  -     meloxicam (MOBIC) 15 MG tablet; Take 1 tablet by mouth Daily. Take once daily.      -Comprehensive DM foot exam performed. Pt educated on importance of tight glucose control and daily foot checks.   -CT reviewed with patient, displays partial union of fracture site without hardware failure.  At at length discussion with patient regarding difficulty of discerning pain from incomplete healing of  fracture versus lateral column overload due to cavus foot type.  -Will place into a low tide CAM boot today for offloading  -Rx Mobic15 mg daily  -Follow up 2 weeks          This document has been electronically signed by Ignacio Lord DPM on November 30, 2018 5:45 PM     Ignacio Lord DPM  11/30/2018  5:45 PM

## 2018-11-30 NOTE — PROGRESS NOTES
Subjective    Ariana Martinez is a 58 y.o. female. she is here today for follow-up.    History of Present Illness       Duration/Timing: Diabetes mellitus type 2, Age at onset of diabetes: 26 years years, Onset of symptoms gradual  timing - constant     qualtiy - uncontrolled     severity - moderate        -----------------------     Severity (Complications/Hospitalizations)  Secondary Macrovascular Complications: No CAD, No CVA, No PAD  Secondary Microvascular Complications: No Diabetic Nephropathy, No Diabetic Retinopathy, Diabetic Neuropathy     Context  Diabetes Regimen: Insulin, Oral Medications     Lab Results   Component Value Date    HGBA1C 8.1 (H) 11/28/2018                         Blood Glucose Readings     Now on dexcom sensor                 Diet  variable carb intake  Exercise: Exercises  walking     Associated Signs/Symptoms  Hyperglycemic Symptoms: No polyuria, No polydipsia, No polyphagia, No weight gain  Hypoglycemic Episodes: Documented symptomatic hypoglycemia, Seizures and syncope related to hypoglycemia                     The following portions of the patient's history were reviewed and updated as appropriate:   Past Medical History:   Diagnosis Date   • Acquired hypothyroidism    • Angina, class IV (CMS/HCC)    • Anxiety    • Benign paroxysmal positional vertigo    • Carpal tunnel syndrome    • Chronic pain    • Coronary atherosclerosis    • Depression    • Diabetes mellitus (CMS/HCC)     Type 2, controlled   • Diabetic polyneuropathy (CMS/HCC)    • Elevated cholesterol    • Female stress incontinence    • Gastroparesis    • GERD (gastroesophageal reflux disease)    • Hashimoto's thyroiditis    • Hyperlipidemia    • Hypertension    • Low back pain    • Malaise and fatigue    • Multiple joint pain    • Obesity     Refuses to be weighed   • Otalgia     Both   • Perforation of tympanic membrane     Left   • Postoperative wound infection    • Vitamin D deficiency      Past Surgical History:    Procedure Laterality Date   • ABDOMINAL SURGERY     • ANGIOPLASTY      coronary   • BREAST BIOPSY Right    • CARDIAC CATHETERIZATION     • CARPAL TUNNEL RELEASE     • CHOLECYSTECTOMY     • CORONARY ARTERY BYPASS GRAFT     • ENDOSCOPY AND COLONOSCOPY     • FOOT SURGERY      Toes   • GASTRIC BANDING      Revision, laparoscopic   • HYSTERECTOMY     • MOUTH SURGERY     • SALPINGO OOPHORECTOMY     • SHOULDER SURGERY     • TRANSESOPHAGEAL ECHOCARDIOGRAM (LAMONTE)      With color flow     Family History   Problem Relation Age of Onset   • Diabetes Other    • Heart disease Other    • Hypertension Other    • Heart disease Mother    • Stroke Mother    • Hypertension Mother    • Diabetes Sister    • Heart disease Sister    • Hypertension Sister    • Heart disease Sister    • Diabetes Sister    • Hypertension Sister    • Diabetes Sister    • Diabetes Sister    • Diabetes Sister    • Diabetes Sister      OB History      Para Term  AB Living    0 0 0 0 0 0    SAB TAB Ectopic Molar Multiple Live Births    0 0 0   0          Current Outpatient Medications   Medication Sig Dispense Refill   • ARIPiprazole (ABILIFY) 15 MG tablet TAKE 1 TABLET BY MOUTH DAILY. 30 tablet 5   • aspirin 81 MG chewable tablet Chew 81 mg daily.     • atorvastatin (LIPITOR) 40 MG tablet TAKE 1 TABLET BY MOUTH EVERY NIGHT. 90 tablet 1   • BD SHARPS CONTAINER HOME misc 1 each Take As Directed. 1 each 0   • Blood Glucose Monitoring Suppl (ONE TOUCH ULTRA MINI) w/Device kit USE AS DIRECTED TO CHECK BLOOD SUGAR 1 each 0   • Calcium Citrate-Vitamin D (CITRACAL/VITAMIN D) 250-200 MG-UNIT tablet Take 2 tablets by mouth 2 (two) times a day.     • clopidogrel (PLAVIX) 75 MG tablet TAKE 1 TABLET BY MOUTH DAILY. 90 tablet 3   • cyanocobalamin 1000 MCG/ML injection Inject 1 mL into the appropriate muscle as directed by prescriber Every 28 (Twenty-Eight) Days. 1 mL 12   • furosemide (LASIX) 40 MG tablet Take 1 tablet by mouth Daily. 90 tablet 3   •  gabapentin (NEURONTIN) 400 MG capsule TAKE 1 CAPSULE BY MOUTH THREE TIMES A DAY 90 capsule 2   • GLUCAGON EMERGENCY 1 MG injection USE AS DIRECTED AS NEEDED 1 kit 0   • glucose blood (ONE TOUCH ULTRA TEST) test strip 1 each by Other route 4 (Four) Times a Day. Use as instructed 400 each 1   • glucose monitor monitoring kit 1 each As Needed (4 times daily). 1 each 0   • hydrochlorothiazide (MICROZIDE) 12.5 MG capsule TAKE 1 CAPSULE BY MOUTH DAILY. 90 capsule 0   • HYDROcodone-acetaminophen (NORCO) 7.5-325 MG per tablet Take 1 tablet by mouth Every 4 (Four) Hours As Needed for Moderate Pain . 180 tablet 0   • insulin aspart (NOVOLOG FLEXPEN) 100 UNIT/ML solution pen-injector sc pen 60 units qac tid 18 pen 11   • Insulin Glargine (BASAGLAR KWIKPEN) 100 UNIT/ML injection pen 100 units q hs 5 pen 5   • Insulin Pen Needle (B-D ULTRAFINE III SHORT PEN) 31G X 8 MM misc Inject 1 each into the shoulder, thigh, or buttocks 4 (Four) Times a Day. use as directed 150 each 11   • LORazepam (ATIVAN) 0.5 MG tablet Take 1 tablet by mouth Daily As Needed for Anxiety. 15 tablet 2   • meclizine (ANTIVERT) 25 MG tablet Take 1 tablet by mouth 3 (Three) Times a Day As Needed for dizziness. 90 tablet 6   • metoclopramide (REGLAN) 10 MG tablet Take 1 tablet by mouth 4 (Four) Times a Day Before Meals & at Bedtime. (Patient taking differently: Take 10 mg by mouth 4 (Four) Times a Day As Needed.) 30 tablet 0   • metoprolol succinate XL (TOPROL-XL) 25 MG 24 hr tablet TAKE 1 TABLET BY MOUTH DAILY. 31 tablet 6   • mirtazapine (REMERON) 45 MG tablet TAKE 1 TABLET BY MOUTH EVERY NIGHT. 90 tablet 1   • ondansetron (ZOFRAN) 8 MG tablet Take 1 tablet by mouth Every 8 (Eight) Hours. (Patient taking differently: Take 8 mg by mouth Every 8 (Eight) Hours As Needed.) 90 tablet 3   • ONE TOUCH ULTRA TEST test strip USE TO TEST SUGAR 4 TIMES A  each 3   • pantoprazole (PROTONIX) 40 MG EC tablet TAKE 1 TABLET BY MOUTH DAILY. 90 tablet 2   • SITagliptin  "(JANUVIA) 100 MG tablet 100 mg by mouth  daily before breakfast 30 tablet 11   • Syringe/Needle, Disp, (LUER LOCK SAFETY SYRINGES) 25G X 5/8\" 3 ML misc 1 each Daily. 100 each 0   • venlafaxine XR (EFFEXOR-XR) 150 MG 24 hr capsule TAKE ONE CAPSULE BY MOUTH TWICE A DAY FOR DEPRESSION 180 capsule 3   • vitamin D (ERGOCALCIFEROL) 23759 units capsule capsule TAKE ONE CAPSULE BY MOUTH EVERY SUNDAY 12 capsule 2     Current Facility-Administered Medications   Medication Dose Route Frequency Provider Last Rate Last Dose   • cyanocobalamin injection 1,000 mcg  1,000 mcg Intramuscular Q28 Days Francisca Fong MD   1,000 mcg at 11/02/18 0817     Allergies   Allergen Reactions   • Seroquel [Quetiapine Fumarate] Anaphylaxis   • Avandia [Rosiglitazone] Swelling   • Morphine And Related Hallucinations     If patient takes too much     Social History     Socioeconomic History   • Marital status:      Spouse name: Not on file   • Number of children: Not on file   • Years of education: Not on file   • Highest education level: Not on file   Tobacco Use   • Smoking status: Never Smoker   • Smokeless tobacco: Never Used   Substance and Sexual Activity   • Alcohol use: No   • Drug use: No   • Sexual activity: Defer     Comment:        Review of Systems  Review of Systems   Constitutional: Negative for activity change, appetite change, diaphoresis and fatigue.   HENT: Negative for facial swelling, sneezing, sore throat, tinnitus, trouble swallowing and voice change.    Eyes: Negative for photophobia, pain, discharge, redness, itching and visual disturbance.   Respiratory: Negative for apnea, cough, choking, chest tightness and shortness of breath.    Cardiovascular: Negative for chest pain, palpitations and leg swelling.   Gastrointestinal: Negative for abdominal distention, abdominal pain, constipation, diarrhea, nausea and vomiting.   Endocrine: Negative for cold intolerance, heat intolerance, polydipsia, polyphagia " "and polyuria.   Genitourinary: Negative for difficulty urinating, dysuria, frequency, hematuria and urgency.   Musculoskeletal: Negative for arthralgias, back pain, gait problem, joint swelling, myalgias, neck pain and neck stiffness.   Skin: Negative for color change, pallor, rash and wound.   Neurological: Negative for dizziness, tremors, weakness, light-headedness, numbness and headaches.   Hematological: Negative for adenopathy. Does not bruise/bleed easily.   Psychiatric/Behavioral: Negative for behavioral problems, confusion and sleep disturbance.        Objective    /82 (BP Location: Right arm, Patient Position: Sitting, Cuff Size: Adult)   Pulse 84   Ht 172.7 cm (68\")   Wt 118 kg (261 lb)   BMI 39.68 kg/m²   Physical Exam   Constitutional: She is oriented to person, place, and time. She appears well-developed and well-nourished. No distress.   HENT:   Head: Normocephalic and atraumatic.   Right Ear: External ear normal.   Left Ear: External ear normal.   Nose: Nose normal.   Eyes: Conjunctivae and EOM are normal. Pupils are equal, round, and reactive to light.   Neck: Normal range of motion. Neck supple. No tracheal deviation present. No thyromegaly present.   Cardiovascular: Normal rate, regular rhythm and normal heart sounds.   No murmur heard.  Pulmonary/Chest: Effort normal and breath sounds normal. No respiratory distress. She has no wheezes.   Abdominal: Soft. Bowel sounds are normal. There is no tenderness. There is no rebound and no guarding.   Musculoskeletal: Normal range of motion. She exhibits no edema, tenderness or deformity.   Neurological: She is alert and oriented to person, place, and time. No cranial nerve deficit.   Skin: Skin is warm and dry. No rash noted.   Psychiatric: She has a normal mood and affect. Her behavior is normal. Judgment and thought content normal.       Lab Review  Glucose (mg/dL)   Date Value   11/28/2018 232 (H)   07/23/2018 136 (H)   07/22/2018 145 (H) "     Glucose, Arterial (mmol/L)   Date Value   10/14/2017 155     Sodium (mmol/L)   Date Value   11/28/2018 137   07/23/2018 141   07/22/2018 139     Potassium (mmol/L)   Date Value   11/28/2018 3.2 (L)   07/23/2018 3.5   07/22/2018 3.7     Chloride (mmol/L)   Date Value   11/28/2018 99   07/23/2018 107   07/22/2018 106     CO2 (mmol/L)   Date Value   11/28/2018 28.0   07/23/2018 29.0   07/22/2018 27.0     BUN (mg/dL)   Date Value   11/28/2018 14   07/23/2018 9   07/22/2018 11     Creatinine (mg/dL)   Date Value   11/28/2018 0.85   07/23/2018 0.81   07/22/2018 0.71     Hemoglobin A1C (%)   Date Value   11/28/2018 8.1 (H)   07/27/2018 6.4   04/16/2018 7.8 (H)   01/18/2018 8.2 (H)     Triglycerides (mg/dL)   Date Value   04/16/2018 223 (H)   01/18/2018 344 (H)   07/20/2017 389 (H)     LDL Cholesterol  (mg/dL)   Date Value   04/16/2018 108   01/18/2018 130 (H)   07/20/2017 119       Assessment/Plan      1. Mixed hyperlipidemia    2. Essential hypertension    3. 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, with long-term current use of insulin (CMS/[facility])    4. Hypothyroidism, unspecified type    5. Vitamin D deficiency    .    Medications prescribed:  Outpatient Encounter Medications as of 11/30/2018   Medication Sig Dispense Refill   • ARIPiprazole (ABILIFY) 15 MG tablet TAKE 1 TABLET BY MOUTH DAILY. 30 tablet 5   • aspirin 81 MG chewable tablet Chew 81 mg daily.     • atorvastatin (LIPITOR) 40 MG tablet TAKE 1 TABLET BY MOUTH EVERY NIGHT. 90 tablet 1   • BD SHARPS CONTAINER HOME misc 1 each Take As Directed. 1 each 0   • Blood Glucose Monitoring Suppl (ONE TOUCH ULTRA MINI) w/Device kit USE AS DIRECTED TO CHECK BLOOD SUGAR 1 each 0   • Calcium Citrate-Vitamin D (CITRACAL/VITAMIN D) 250-200 MG-UNIT tablet Take 2 tablets by mouth 2 (two) times a day.     • clopidogrel (PLAVIX) 75 MG tablet TAKE 1 TABLET BY MOUTH DAILY. 90 tablet 3   • cyanocobalamin 1000 MCG/ML injection Inject 1 mL into the  appropriate muscle as directed by prescriber Every 28 (Twenty-Eight) Days. 1 mL 12   • furosemide (LASIX) 40 MG tablet Take 1 tablet by mouth Daily. 90 tablet 3   • gabapentin (NEURONTIN) 400 MG capsule TAKE 1 CAPSULE BY MOUTH THREE TIMES A DAY 90 capsule 2   • GLUCAGON EMERGENCY 1 MG injection USE AS DIRECTED AS NEEDED 1 kit 0   • glucose blood (ONE TOUCH ULTRA TEST) test strip 1 each by Other route 4 (Four) Times a Day. Use as instructed 400 each 1   • glucose monitor monitoring kit 1 each As Needed (4 times daily). 1 each 0   • hydrochlorothiazide (MICROZIDE) 12.5 MG capsule TAKE 1 CAPSULE BY MOUTH DAILY. 90 capsule 0   • HYDROcodone-acetaminophen (NORCO) 7.5-325 MG per tablet Take 1 tablet by mouth Every 4 (Four) Hours As Needed for Moderate Pain . 180 tablet 0   • insulin aspart (NOVOLOG FLEXPEN) 100 UNIT/ML solution pen-injector sc pen 60 units qac tid 18 pen 11   • Insulin Glargine (BASAGLAR KWIKPEN) 100 UNIT/ML injection pen 100 units q hs 5 pen 5   • Insulin Pen Needle (B-D ULTRAFINE III SHORT PEN) 31G X 8 MM misc Inject 1 each into the shoulder, thigh, or buttocks 4 (Four) Times a Day. use as directed 150 each 11   • LORazepam (ATIVAN) 0.5 MG tablet Take 1 tablet by mouth Daily As Needed for Anxiety. 15 tablet 2   • meclizine (ANTIVERT) 25 MG tablet Take 1 tablet by mouth 3 (Three) Times a Day As Needed for dizziness. 90 tablet 6   • metoclopramide (REGLAN) 10 MG tablet Take 1 tablet by mouth 4 (Four) Times a Day Before Meals & at Bedtime. (Patient taking differently: Take 10 mg by mouth 4 (Four) Times a Day As Needed.) 30 tablet 0   • metoprolol succinate XL (TOPROL-XL) 25 MG 24 hr tablet TAKE 1 TABLET BY MOUTH DAILY. 31 tablet 6   • mirtazapine (REMERON) 45 MG tablet TAKE 1 TABLET BY MOUTH EVERY NIGHT. 90 tablet 1   • ondansetron (ZOFRAN) 8 MG tablet Take 1 tablet by mouth Every 8 (Eight) Hours. (Patient taking differently: Take 8 mg by mouth Every 8 (Eight) Hours As Needed.) 90 tablet 3   • ONE TOUCH  "ULTRA TEST test strip USE TO TEST SUGAR 4 TIMES A  each 3   • pantoprazole (PROTONIX) 40 MG EC tablet TAKE 1 TABLET BY MOUTH DAILY. 90 tablet 2   • SITagliptin (JANUVIA) 100 MG tablet 100 mg by mouth  daily before breakfast 30 tablet 11   • Syringe/Needle, Disp, (LUER LOCK SAFETY SYRINGES) 25G X 5/8\" 3 ML misc 1 each Daily. 100 each 0   • venlafaxine XR (EFFEXOR-XR) 150 MG 24 hr capsule TAKE ONE CAPSULE BY MOUTH TWICE A DAY FOR DEPRESSION 180 capsule 3   • vitamin D (ERGOCALCIFEROL) 62847 units capsule capsule TAKE ONE CAPSULE BY MOUTH EVERY SUNDAY 12 capsule 2     Facility-Administered Encounter Medications as of 11/30/2018   Medication Dose Route Frequency Provider Last Rate Last Dose   • cyanocobalamin injection 1,000 mcg  1,000 mcg Intramuscular Q28 Days Francisca Fong MD   1,000 mcg at 11/02/18 0817       Orders placed during this encounter include:  Orders Placed This Encounter   Procedures   • Comprehensive Metabolic Panel   • Hemoglobin A1c   • TSH   • Vitamin D 25 Hydroxy   • Vitamin B12   • CBC & Differential     Order Specific Question:   Manual Differential     Answer:   No       Glycemic Management             Lab Results   Component Value Date    HGBA1C 8.1 (H) 11/28/2018          Dexcom sensor ---states has been off for two months -- now has back             Basaglar taking 100 units         ==================        Novolog      Taking 60 units      Discussed carb counting but states cannot     She will need to look at her meals because 30 units may not be enough for some meals and for others meals to strong     If you eat a meal and give 30 units and your sugar is 200 or higher before the next meal look back at that meal and the next time you eat it either give more insulin or eat less     Also if you have a low after the meal give less insulin the next time you eat that meal        ==================     Jardiance 10 mg tablet , take before breakfast---will stop due to dizziness for " possible volume depletion-----the dizziness has resolved since stopping jardiance        ==================     Metformin 500 mg tablets - caused diarrhea --stopped      ====================     start bydureon - stopped       Victoza stopped due to side effects     Warned of the gastric side effects she does have gastroparesis but she wants to try      Gave a sample she will let me know if she wants an RX called in      If vomiting or abdominal pain stop       januvia 100 mg daily -  Restart      ------------------------------      Lipid Management        on Lipitor   on fenofibrate                 LDL needs to be 70 or less     add zetia     Also discussed restarting the jardiance for the heart benefits but refuses     Component      Latest Ref Rng & Units 4/16/2018   Total Cholesterol      0 - 199 mg/dL 197   Triglycerides      20 - 199 mg/dL 223 (H)   HDL Cholesterol      60 - 200 mg/dL 33 (L)   LDL Cholesterol       1 - 129 mg/dL 108   LDL/HDL Ratio      0.00 - 3.22 3.62 (H)               Blood Pressure Management: Control of blood pressure  on ACEi     stopped the lasix         Microvascular Complication Monitoring: Neuropathy type sensorial     Neurontin twice a day      intermittetly takes reglan for gastroparesis     states eye exam ws 2015  RX for shoes - diabetic neuropathy      Immunization - last influenza vaccine Oct. 2017         Other Diabetes Related Aspects     TSH abnormal from July to Sept 2014     TSH in the 5 to 7 range                 she refuses thyroid medicaton                      Lab Results   Component Value Date     TSH 4.650 04/16/2018               Lab Results   Component Value Date     TSH 3.950 07/21/2018         ---     3-15     B12 low      now b12 shots     June 2015     B12- 968     Vitamin d def        Vitamin d - 26      Continue vitamin d supplement      April 2018     Vitamin d -28         Referral to GI      Reason - diarrhea chronic      History of gastroparesis                  4. Follow-up: Return in about 3 months (around 2/28/2019) for Recheck.

## 2018-12-01 PROBLEM — J06.9 ACUTE UPPER RESPIRATORY INFECTION: Status: ACTIVE | Noted: 2018-12-01

## 2018-12-01 PROBLEM — H65.92 OTITIS MEDIA WITH EFFUSION, LEFT: Status: ACTIVE | Noted: 2018-12-01

## 2018-12-06 ENCOUNTER — OFFICE VISIT (OUTPATIENT)
Dept: FAMILY MEDICINE CLINIC | Facility: CLINIC | Age: 58
End: 2018-12-06

## 2018-12-06 VITALS
DIASTOLIC BLOOD PRESSURE: 68 MMHG | SYSTOLIC BLOOD PRESSURE: 118 MMHG | OXYGEN SATURATION: 96 % | HEIGHT: 68 IN | HEART RATE: 78 BPM | BODY MASS INDEX: 39.12 KG/M2 | TEMPERATURE: 97.8 F

## 2018-12-06 DIAGNOSIS — IMO0002 UNCONTROLLED TYPE 2 DIABETES MELLITUS WITH NEUROLOGIC COMPLICATION, WITH LONG-TERM CURRENT USE OF INSULIN: ICD-10-CM

## 2018-12-06 DIAGNOSIS — J06.9 ACUTE UPPER RESPIRATORY INFECTION: ICD-10-CM

## 2018-12-06 DIAGNOSIS — H10.33 ACUTE BACTERIAL CONJUNCTIVITIS OF BOTH EYES: ICD-10-CM

## 2018-12-06 DIAGNOSIS — E11.9 ENCOUNTER FOR COMPREHENSIVE DIABETIC FOOT EXAMINATION, TYPE 2 DIABETES MELLITUS (HCC): Primary | ICD-10-CM

## 2018-12-06 DIAGNOSIS — E66.09 CLASS 2 OBESITY DUE TO EXCESS CALORIES WITHOUT SERIOUS COMORBIDITY WITH BODY MASS INDEX (BMI) OF 39.0 TO 39.9 IN ADULT: ICD-10-CM

## 2018-12-06 PROBLEM — IMO0001 CLASS 3 OBESITY DUE TO EXCESS CALORIES WITHOUT SERIOUS COMORBIDITY WITH BODY MASS INDEX (BMI) OF 40.0 TO 44.9 IN ADULT: Status: RESOLVED | Noted: 2018-02-05 | Resolved: 2018-12-06

## 2018-12-06 PROCEDURE — 99214 OFFICE O/P EST MOD 30 MIN: CPT | Performed by: FAMILY MEDICINE

## 2018-12-06 RX ORDER — POLYMYXIN B SULFATE AND TRIMETHOPRIM 1; 10000 MG/ML; [USP'U]/ML
1 SOLUTION OPHTHALMIC EVERY 4 HOURS
Qty: 10 ML | Refills: 0 | Status: SHIPPED | OUTPATIENT
Start: 2018-12-06 | End: 2019-01-11

## 2018-12-06 NOTE — PROGRESS NOTES
"Subjective   Chief Complaint   Patient presents with   • Diabetes     Diabetic foot exam     Ariana Martinez is a 58 y.o. female.   Diabetes (Diabetic foot exam)    History of Present Illness     Diabetes - managed with insulin  Followed by endocrinology  Average readings of FBG levels are 100-140  Currently prescribed novolog, tresiba  Has previously failed metformin, bydureon, jardiance, januvia  Diabetic eye exam performed by Dr England    She has requested a script for shingles vaccine today    C/o eye drainage  Started over the weekend  She was seen by urgent care over the weekend and started on amoxicillin, phenergan DM  The eye drainage has been noted to be bilateral but L>R    The following portions of the patient's history were reviewed and updated as appropriate: allergies, current medications, past family history, past medical history, past social history, past surgical history and problem list.    Review of Systems   Constitutional: Negative for appetite change, chills, fatigue and fever.   HENT: Positive for congestion, postnasal drip and sore throat. Negative for ear pain and rhinorrhea.    Eyes: Positive for photophobia, discharge and redness. Negative for itching.   Respiratory: Positive for cough. Negative for shortness of breath.    Cardiovascular: Negative for chest pain and palpitations.   Gastrointestinal: Negative for abdominal pain, constipation, diarrhea and nausea.   Genitourinary: Negative for dysuria.   Musculoskeletal: Negative for back pain, joint swelling and neck pain.   Skin: Negative for rash.   Neurological: Negative for dizziness and headaches.       Objective   /68   Pulse 78   Temp 97.8 °F (36.6 °C)   Ht 172.7 cm (67.99\")   SpO2 96%   BMI 39.12 kg/m²   Physical Exam   Constitutional: She is oriented to person, place, and time. She appears well-developed and well-nourished.   HENT:   Head: Normocephalic and atraumatic.   Eyes: Pupils are equal, round, and reactive to " light. Right eye exhibits chemosis and discharge. Left eye exhibits chemosis and discharge. Right conjunctiva is injected. Left conjunctiva is injected.   Neck: Normal range of motion. Neck supple.   Cardiovascular: Normal rate, regular rhythm and normal heart sounds.   Pulmonary/Chest: Effort normal and breath sounds normal. No respiratory distress. She has no wheezes. She has no rales.   Abdominal: Soft. Bowel sounds are normal.   Musculoskeletal: Normal range of motion.    Ariana had a diabetic foot exam performed today.   During the foot exam she had a monofilament test performed (R2/10, L4/10).    Neurological Sensory Findings -  Unaltered sharp/dull right ankle/foot discrimination and unaltered sharp/dull left ankle/foot discrimination. No right ankle/foot altered proprioception and no left ankle/foot altered proprioception  Vascular Status -  Her right foot exhibits normal foot vasculature  and no edema. Her left foot exhibits normal foot vasculature  and no edema.  Skin Integrity  -  Her right foot skin is intact. She has callous right foot.  She has no right foot onychomycosis, no right foot ulcer, no ingrown toenail on right foot, right heel is not dry and cracked, no right foot warmth, no right foot blister and no right foot gangrenous changes.Her left foot skin is intact.She has callous left foot. She has no left foot onychomycosis, no left foot ulcer, no left ingrown toenail, no left heel dry and cracked, no left foot warmth, no left foot blister and no left foot gangrenous changes..  Neurological: She is alert and oriented to person, place, and time. She has normal strength and normal reflexes. No sensory deficit.   Skin: Skin is warm and dry.   Psychiatric: She has a normal mood and affect.   Nursing note and vitals reviewed.      Assessment/Plan   Problems Addressed this Visit        Respiratory    Acute upper respiratory infection       Endocrine    Uncontrolled type 2 diabetes mellitus with  neurologic complication, with long-term current use of insulin (CMS/[facility])      Other Visit Diagnoses     Encounter for comprehensive diabetic foot examination, type 2 diabetes mellitus (CMS/[facility])    -  Primary    Acute bacterial conjunctivitis of both eyes        Relevant Medications    trimethoprim-polymyxin b (POLYTRIM) 08486-6.1 UNIT/ML-% ophthalmic solution    Class 2 obesity due to excess calories without serious comorbidity with body mass index (BMI) of 39.0 to 39.9 in adult            Script for diabetic shoes  Diabetic foot exam done today    Work on getting records for diabetic eye exam - call made to Dr England office    Patient's Body mass index is 39.12 kg/m². BMI is above normal parameters. Recommendations include: exercise counseling and nutrition counseling.    Start polytrim for eyes  Continue with amoxicillin    Script for shingles    Recheck as scheduled

## 2018-12-11 RX ORDER — HYDROCHLOROTHIAZIDE 12.5 MG/1
CAPSULE, GELATIN COATED ORAL
Qty: 90 CAPSULE | Refills: 0 | Status: SHIPPED | OUTPATIENT
Start: 2018-12-11 | End: 2019-01-11

## 2018-12-12 ENCOUNTER — OFFICE VISIT (OUTPATIENT)
Dept: FAMILY MEDICINE CLINIC | Facility: CLINIC | Age: 58
End: 2018-12-12

## 2018-12-12 VITALS
DIASTOLIC BLOOD PRESSURE: 66 MMHG | OXYGEN SATURATION: 98 % | HEART RATE: 80 BPM | WEIGHT: 257 LBS | BODY MASS INDEX: 38.95 KG/M2 | TEMPERATURE: 97.9 F | SYSTOLIC BLOOD PRESSURE: 118 MMHG | HEIGHT: 68 IN

## 2018-12-12 DIAGNOSIS — F79 MENTAL DISABILITY: Primary | ICD-10-CM

## 2018-12-12 DIAGNOSIS — F33.1 DEPRESSION, MAJOR, RECURRENT, MODERATE (HCC): ICD-10-CM

## 2018-12-12 DIAGNOSIS — F41.1 GAD (GENERALIZED ANXIETY DISORDER): ICD-10-CM

## 2018-12-12 PROCEDURE — 99213 OFFICE O/P EST LOW 20 MIN: CPT | Performed by: FAMILY MEDICINE

## 2018-12-12 RX ORDER — BLOOD-GLUCOSE METER
KIT MISCELLANEOUS
Qty: 1 EACH | Refills: 0 | Status: SHIPPED | OUTPATIENT
Start: 2018-12-12 | End: 2019-02-06 | Stop reason: SDUPTHER

## 2018-12-12 NOTE — PROGRESS NOTES
"Subjective   Chief Complaint   Patient presents with   • Disability paperwork needs more information     Ariana Martinez is a 58 y.o. female.   Disability paperwork needs more information    History of Present Illness     She presents today because she is needing more information for her disability paperwork.  She had paperwork faxed to the [facility] of the [facility] and completed 11/07/18.  She has suffered from generalized anxiety and recurrent major depressive disorder.  This limits her to a low stress work environment.  She was sent a letter that has recommended that if she wishes to pursue disability senior living she needs to seek further information from a board certified psychiatrist.    The following portions of the patient's history were reviewed and updated as appropriate: allergies, current medications, past family history, past medical history, past social history, past surgical history and problem list.    Review of Systems   Constitutional: Negative for appetite change, chills, fatigue and fever.   HENT: Negative for congestion, ear pain, rhinorrhea and sore throat.    Eyes: Negative for pain.   Respiratory: Negative for cough and shortness of breath.    Cardiovascular: Negative for chest pain and palpitations.   Gastrointestinal: Negative for abdominal pain, constipation, diarrhea and nausea.   Genitourinary: Negative for dysuria.   Musculoskeletal: Negative for back pain, joint swelling and neck pain.   Skin: Negative for rash.   Neurological: Negative for dizziness and headaches.       Objective   /66   Pulse 80   Temp 97.9 °F (36.6 °C)   Ht 172.7 cm (67.99\")   Wt 117 kg (257 lb)   SpO2 98%   BMI 39.09 kg/m²   Physical Exam   Constitutional: She is oriented to person, place, and time. She appears well-developed and well-nourished.   HENT:   Head: Normocephalic and atraumatic.   Eyes: Pupils are equal, round, and reactive to light.   Neck: Normal range of motion. " Neck supple.   Cardiovascular: Normal rate, regular rhythm and normal heart sounds.   Pulmonary/Chest: No respiratory distress. She has no wheezes. She has no rales.   Neurological: She is alert and oriented to person, place, and time.   Skin: Skin is warm and dry.   Psychiatric: She has a normal mood and affect.   Nursing note and vitals reviewed.      Assessment/Plan   Problems Addressed this Visit        Other    MAURICIO (generalized anxiety disorder)    Depression, major, recurrent, moderate (CMS/HCC)      Other Visit Diagnoses     Mental disability    -  Primary        Call made to [facility] for a referral    Copy made of the letter - this will be placed in her chart    Recheck as scheduled

## 2018-12-14 ENCOUNTER — TELEPHONE (OUTPATIENT)
Dept: CARDIOLOGY | Facility: CLINIC | Age: 58
End: 2018-12-14

## 2018-12-14 ENCOUNTER — OFFICE VISIT (OUTPATIENT)
Dept: PODIATRY | Facility: CLINIC | Age: 58
End: 2018-12-14

## 2018-12-14 VITALS — HEART RATE: 96 BPM | OXYGEN SATURATION: 99 % | HEIGHT: 68 IN | WEIGHT: 257 LBS | BODY MASS INDEX: 38.95 KG/M2

## 2018-12-14 DIAGNOSIS — E11.42 DIABETIC POLYNEUROPATHY ASSOCIATED WITH TYPE 2 DIABETES MELLITUS (HCC): ICD-10-CM

## 2018-12-14 DIAGNOSIS — S92.352K DISPLACED FRACTURE OF FIFTH METATARSAL BONE, LEFT FOOT, SUBSEQUENT ENCOUNTER FOR FRACTURE WITH NONUNION: Primary | ICD-10-CM

## 2018-12-14 DIAGNOSIS — Q66.70 PES CAVUS: ICD-10-CM

## 2018-12-14 DIAGNOSIS — F33.1 MODERATE EPISODE OF RECURRENT MAJOR DEPRESSIVE DISORDER (HCC): ICD-10-CM

## 2018-12-14 DIAGNOSIS — F41.1 GENERALIZED ANXIETY DISORDER: Primary | ICD-10-CM

## 2018-12-14 PROCEDURE — 99213 OFFICE O/P EST LOW 20 MIN: CPT | Performed by: PODIATRIST

## 2018-12-14 NOTE — TELEPHONE ENCOUNTER
Patient contacted the office stating she was having chest pain. Ms. mckee was advised to report to er for further evaluation. An appt for the patient to see dr. galeano will be made for Monday at 9 am. She did state if her symptoms worsened she would go to the er.

## 2018-12-14 NOTE — PROGRESS NOTES
Ariana Luis Martinez  1960  58 y.o. female  PCP: Dr. Fong last seen 11/07/2018.  BS: 120 per patient     Patient presents to clinic today for a follow up on left foot pain she states she has had very little improvement.     12/14/2018    Chief Complaint   Patient presents with   • Left Foot - Follow-up           History of Present Illness    Ms Martinez is a 57 y/o diabetic female who presents for f/u evaluation of plantar lateral left foot pain.  Has history of open reduction internal fixation fifth metatarsal base fracture. Now with hypertrophic non-union.  Placed into CAM boot at last visit. Patient notes no improvement of symptoms.    Past Medical History:   Diagnosis Date   • Acquired hypothyroidism    • Angina, class IV (CMS/HCC)    • Anxiety    • Benign paroxysmal positional vertigo    • Carpal tunnel syndrome    • Chronic pain    • Coronary atherosclerosis    • Depression    • Diabetes mellitus (CMS/HCC)     Type 2, controlled   • Diabetic polyneuropathy (CMS/HCC)    • Elevated cholesterol    • Female stress incontinence    • Gastroparesis    • GERD (gastroesophageal reflux disease)    • Hashimoto's thyroiditis    • Hyperlipidemia    • Hypertension    • Low back pain    • Malaise and fatigue    • Multiple joint pain    • Obesity     Refuses to be weighed   • Otalgia     Both   • Perforation of tympanic membrane     Left   • Postoperative wound infection    • Vitamin D deficiency          Past Surgical History:   Procedure Laterality Date   • ABDOMINAL SURGERY     • ANGIOPLASTY      coronary   • BREAST BIOPSY Right    • CARDIAC CATHETERIZATION     • CARDIAC CATHETERIZATION N/A 6/20/2017    Procedure: Right Heart Cath;  Surgeon: Can Kwon MD PhD;  Location: [facility] INVASIVE LOCATION;  Service:    • CARPAL TUNNEL RELEASE     • CHOLECYSTECTOMY     • CORONARY ARTERY BYPASS GRAFT  2005   • ENDOSCOPY N/A 10/19/2018    Procedure: ESOPHAGOGASTRODUODENOSCOPY possible dilation;  Surgeon: Erica  Julián FARFAN MD;  Location: [facility] ENDOSCOPY;  Service: Gastroenterology   • ENDOSCOPY AND COLONOSCOPY     • FOOT SURGERY      Toes   • GASTRIC BANDING      Revision, laparoscopic   • HYSTERECTOMY     • MOUTH SURGERY     • SALPINGO OOPHORECTOMY     • SHOULDER SURGERY     • TRANSESOPHAGEAL ECHOCARDIOGRAM (LAMONTE)      With color flow         Family History   Problem Relation Age of Onset   • Diabetes Other    • Heart disease Other    • Hypertension Other    • Heart disease Mother    • Stroke Mother    • Hypertension Mother    • Diabetes Sister    • Heart disease Sister    • Hypertension Sister    • Heart disease Sister    • Diabetes Sister    • Hypertension Sister    • Diabetes Sister    • Diabetes Sister    • Diabetes Sister    • Diabetes Sister          Social History     Socioeconomic History   • Marital status:      Spouse name: Not on file   • Number of children: Not on file   • Years of education: Not on file   • Highest education level: Not on file   Social Needs   • Financial resource strain: Not on file   • Food insecurity - worry: Not on file   • Food insecurity - inability: Not on file   • Transportation needs - medical: Not on file   • Transportation needs - non-medical: Not on file   Occupational History   • Not on file   Tobacco Use   • Smoking status: Never Smoker   • Smokeless tobacco: Never Used   Substance and Sexual Activity   • Alcohol use: No   • Drug use: No   • Sexual activity: Defer     Comment:    Other Topics Concern   • Not on file   Social History Narrative   • Not on file         Current Outpatient Medications   Medication Sig Dispense Refill   • amoxicillin (AMOXIL) 500 MG capsule Take 1 capsule by mouth 3 (Three) Times a Day. 30 capsule 0   • ARIPiprazole (ABILIFY) 15 MG tablet TAKE 1 TABLET BY MOUTH DAILY. 30 tablet 5   • aspirin 81 MG chewable tablet Chew 81 mg daily.     • atorvastatin (LIPITOR) 40 MG tablet TAKE 1 TABLET BY MOUTH EVERY NIGHT. 90 tablet 1   • BD MUKUL  CONTAINER HOME misc 1 each Take As Directed. 1 each 0   • Blood Glucose Monitoring Suppl (ONE TOUCH ULTRA MINI) w/Device kit USE AS DIRECTED TO CHECK BLOOD SUGAR 1 each 0   • Blood Glucose Monitoring Suppl (ONE TOUCH ULTRA MINI) w/Device kit USE 4 TIMES A DAY 1 each 0   • Calcium Citrate-Vitamin D (CITRACAL/VITAMIN D) 250-200 MG-UNIT tablet Take 2 tablets by mouth 2 (two) times a day.     • clopidogrel (PLAVIX) 75 MG tablet TAKE 1 TABLET BY MOUTH DAILY. 90 tablet 3   • cyanocobalamin 1000 MCG/ML injection Inject 1 mL into the appropriate muscle as directed by prescriber Every 28 (Twenty-Eight) Days. 1 mL 12   • fluconazole (DIFLUCAN) 200 MG tablet Take one at the first sign of infection and may repeat in 3 days as needed. 2 tablet 0   • furosemide (LASIX) 40 MG tablet Take 1 tablet by mouth Daily. 90 tablet 3   • gabapentin (NEURONTIN) 400 MG capsule TAKE 1 CAPSULE BY MOUTH THREE TIMES A DAY 90 capsule 2   • GLUCAGON EMERGENCY 1 MG injection USE AS DIRECTED AS NEEDED 1 kit 0   • glucose blood (ONE TOUCH ULTRA TEST) test strip 1 each by Other route 4 (Four) Times a Day. Use as instructed 400 each 1   • hydrochlorothiazide (MICROZIDE) 12.5 MG capsule TAKE 1 CAPSULE BY MOUTH DAILY. 90 capsule 0   • HYDROcodone-acetaminophen (NORCO) 7.5-325 MG per tablet Take 1 tablet by mouth Every 4 (Four) Hours As Needed for Moderate Pain . 180 tablet 0   • insulin aspart (NOVOLOG FLEXPEN) 100 UNIT/ML solution pen-injector sc pen 60 units qac tid 18 pen 11   • Insulin Glargine (BASAGLAR KWIKPEN) 100 UNIT/ML injection pen 100 units q hs 5 pen 5   • Insulin Pen Needle (B-D ULTRAFINE III SHORT PEN) 31G X 8 MM misc Inject 1 each into the shoulder, thigh, or buttocks 4 (Four) Times a Day. use as directed 150 each 11   • LORazepam (ATIVAN) 0.5 MG tablet Take 1 tablet by mouth Daily As Needed for Anxiety. 15 tablet 2   • meclizine (ANTIVERT) 25 MG tablet Take 1 tablet by mouth 3 (Three) Times a Day As Needed for dizziness. 90 tablet 6   •  "meloxicam (MOBIC) 15 MG tablet Take 1 tablet by mouth Daily. Take once daily. 30 tablet 0   • metoclopramide (REGLAN) 10 MG tablet Take 1 tablet by mouth 4 (Four) Times a Day Before Meals & at Bedtime. (Patient taking differently: Take 10 mg by mouth 4 (Four) Times a Day As Needed.) 30 tablet 0   • metoprolol succinate XL (TOPROL-XL) 25 MG 24 hr tablet TAKE 1 TABLET BY MOUTH DAILY. 31 tablet 6   • mirtazapine (REMERON) 45 MG tablet TAKE 1 TABLET BY MOUTH EVERY NIGHT. 90 tablet 1   • ONE TOUCH ULTRA TEST test strip USE TO TEST SUGAR 4 TIMES A  each 3   • pantoprazole (PROTONIX) 40 MG EC tablet TAKE 1 TABLET BY MOUTH DAILY. 90 tablet 2   • promethazine-dextromethorphan (PROMETHAZINE-DM) 6.25-15 MG/5ML syrup Take 5 mL by mouth 4 (Four) Times a Day As Needed for Cough. 160 mL 0   • SITagliptin (JANUVIA) 100 MG tablet 100 mg by mouth  daily before breakfast 30 tablet 11   • Syringe/Needle, Disp, (LUER LOCK SAFETY SYRINGES) 25G X 5/8\" 3 ML misc 1 each Daily. 100 each 0   • trimethoprim-polymyxin b (POLYTRIM) 23056-1.1 UNIT/ML-% ophthalmic solution Administer 1 drop to both eyes Every 4 (Four) Hours. 10 mL 0   • venlafaxine XR (EFFEXOR-XR) 150 MG 24 hr capsule TAKE ONE CAPSULE BY MOUTH TWICE A DAY FOR DEPRESSION 180 capsule 3   • vitamin D (ERGOCALCIFEROL) 49124 units capsule capsule TAKE ONE CAPSULE BY MOUTH EVERY SUNDAY 12 capsule 2     Current Facility-Administered Medications   Medication Dose Route Frequency Provider Last Rate Last Dose   • cyanocobalamin injection 1,000 mcg  1,000 mcg Intramuscular Q28 Days Francisca Fong MD   1,000 mcg at 11/02/18 0817         OBJECTIVE    Pulse 96   Ht 172.7 cm (67.99\")   Wt 117 kg (257 lb)   SpO2 99%   BMI 39.09 kg/m²       Review of Systems   Constitutional:  Denies recent weight loss, weight gain, fever or chills, no change in exercise tolerance  Musculoskeletal: Foot pain. Hammertoe deformity.   Skin:  Dry skin.  Neurological:  Burning sensations to feet " b/l.  Psychiatric/Behavioral: Denies depression    Physical Exam   Constitutional: she appears well-developed and well-nourished.   Psychiatric: she has a normal mood and affect. her   behavior is normal.      Lower Extremity Exam:  Vascular: DP pulses palpable 2+. PT not readily palpable  Negative hair growth.   Mild perimalleolar/left dorsal foot edema  Neuro: Protective sensation absent to foot, b/l. Reestablished at mid calf   DTRs intact  Vibratory sensation diminished.  Integument: No open wounds or lesions.  Diffuse xerosis b/l.  Webspaces c/d/i  Musculoskeletal: LE muscle strength 4/5.   Gait normal, in DM shoes  Semi-rigid hammertoe deformity toes 2-5 b/l.  Nails 1-5 b/l wnl of length and thickness  Ankle ROM decreased without pain or crepitus  Resting calcaneal stance position in mild varus.  Mild pes cavus.  Tenderness palpation fifth metatarsal base, left    CT left foot without contrast.     CLINICAL INDICATION: Fracture fifth metatarsal. Evaluate for  nonunion or delayed union      COMPARISON: Left foot November 19, 2018.        TECHNIQUE: Noncontrast helical scanning with axial and coronal  reformations. Soft tissue, lung, and bone windows reviewed.     This exam was performed according to our departmental  dose-optimization program, which includes automated exposure  control, adjustment of the mA and/or kV according to patient size  and/or use of iterative reconstruction technique.     Findings: There is a fracture of the proximal shaft of fifth  metatarsal stabilized with internal fixation by a longitudinal  surgical screw. The distal aspect of the surgical screw extends  out of the cortex of the midshaft in a dorsal fashion.     There is partial bony fusion across the fracture line especially  in the dorsal aspect. The majority however of the fracture is  unfused, delayed union.     IMPRESSION:  CONCLUSION: As above.     Electronically signed by:  Naveed Taylor MD  11/27/2018 5:19 PM  CST  Workstation: [id number]    Procedures        ASSESSMENT AND PLAN    Ariana was seen today for follow-up.    Diagnoses and all orders for this visit:    Displaced fracture of fifth metatarsal bone, left foot, subsequent encounter for fracture with nonunion    Pes cavus    Diabetic polyneuropathy associated with type 2 diabetes mellitus (CMS/HCC)      -Comprehensive DM foot exam performed. Pt educated on importance of tight glucose control and daily foot checks.   -CT reviewed with patient, displays partial union of fracture site without hardware failure.  At at length discussion with patient regarding difficulty of discerning pain from incomplete healing of fracture versus lateral column overload due to cavus foot type.  -Patient feels CAM boot makes pain worse. D/c boot, return to supportive DM shoes, limit barefoot walking  -Continue Mobic15 mg daily  -Discussed any surgical intervention would include revision of ORIF, along with calcaneal osteotomy to help offload lateral column. Patient would like to hold off and see if pain improves longer.  -Follow up as needed          This document has been electronically signed by Ignacio Lord DPM on December 25, 2018 1:54 PM     Ignacio Lord DPM  12/25/2018  1:54 PM

## 2018-12-17 ENCOUNTER — OFFICE VISIT (OUTPATIENT)
Dept: CARDIOLOGY | Facility: CLINIC | Age: 58
End: 2018-12-17

## 2018-12-17 VITALS
WEIGHT: 255.6 LBS | HEIGHT: 68 IN | HEART RATE: 82 BPM | OXYGEN SATURATION: 98 % | SYSTOLIC BLOOD PRESSURE: 116 MMHG | BODY MASS INDEX: 38.74 KG/M2 | DIASTOLIC BLOOD PRESSURE: 68 MMHG

## 2018-12-17 DIAGNOSIS — I27.20 PULMONARY HYPERTENSION (HCC): Primary | ICD-10-CM

## 2018-12-17 DIAGNOSIS — I50.30 (HFPEF) HEART FAILURE WITH PRESERVED EJECTION FRACTION (HCC): ICD-10-CM

## 2018-12-17 DIAGNOSIS — R07.9 CHEST PAIN, UNSPECIFIED TYPE: Primary | ICD-10-CM

## 2018-12-17 DIAGNOSIS — E78.2 MIXED HYPERLIPIDEMIA: ICD-10-CM

## 2018-12-17 DIAGNOSIS — I25.10 CORONARY ARTERY DISEASE INVOLVING NATIVE CORONARY ARTERY OF NATIVE HEART WITHOUT ANGINA PECTORIS: ICD-10-CM

## 2018-12-17 DIAGNOSIS — E66.01 CLASS 2 SEVERE OBESITY DUE TO EXCESS CALORIES WITH SERIOUS COMORBIDITY AND BODY MASS INDEX (BMI) OF 38.0 TO 38.9 IN ADULT (HCC): ICD-10-CM

## 2018-12-17 PROBLEM — R10.10 UPPER ABDOMINAL PAIN: Status: RESOLVED | Noted: 2018-09-18 | Resolved: 2018-12-17

## 2018-12-17 PROBLEM — Z86.79 H/O INTERMITTENT CLAUDICATION: Status: RESOLVED | Noted: 2018-06-12 | Resolved: 2018-12-17

## 2018-12-17 PROCEDURE — 93000 ELECTROCARDIOGRAM COMPLETE: CPT | Performed by: INTERNAL MEDICINE

## 2018-12-17 PROCEDURE — 99214 OFFICE O/P EST MOD 30 MIN: CPT | Performed by: INTERNAL MEDICINE

## 2018-12-17 NOTE — PROGRESS NOTES
Cardiovascular Medicine   Can Kwon M.D., Ph.D., [facility]      The patient returns to cardiovascular clinic for follow-up of the following:    PROBLEM LIST:  1. MV ASCAD  A. CABG, 2005  -LIMA-LAD: Prior PCI 2.5 x 28mm in 2004  -RCA, 60%  B. [facility], 6/2016  -pLAD: 50%  -Ostial D1-30%  -mLAD: 100%  -dLAD fills via LIMA  -OM1: 60%  -FFR was 0.93  2. HTN  3. HLD  4. DM2  5. Mild MR/TR  6. LE pain  7. PAH    Ariana Martinez is a 58 y.o. female who returns to clinic today in follow-up.  She does have a history of heart failure with preserved ejection fraction and pulmonary venous hypertension.  Concerning this, she has stable dyspnea.  She is noncompliant much of the time with her diet.  She is currently prescribed Toprol-XL and furosemide.  She's had emergency department visits or inpatient admissions for acute decompensated heart failure.  Weight is stable.  She denies any dizziness, lightheadedness or syncope.  She's had no PND or orthopnea.  Concerning her mitral regurgitation has remained mild.  Her most recent TTE was May 2018.  Concerning her coronary artery disease she is status post LIMA to the LAD by CABG in 2005.  She is currently prescribed aspirin, Plavix, Lipitor and Toprol-XL.  She had a myocardial perfusion scintigraphy in May 2018 showed no inducible ischemia.  She contacted my office last week and said that she was having active chest pain.  She was told to go to the emergency department.  She did not go to the ED. She tells me that she received a letter about her disability. She had to get a new letter filled out. She says she got stressed out. She was having a pressure sensation in her chest. It did respond to nitro. She had no exertional symptoms. She has not had recurrence.  Otherwise, she was given an appointment earlier today for an add-on.  Concerning her hyperlipidemia she remains on a statin therapy.  She is medication compliant.  Denies side effects.      The following portions of the  patient's history were reviewed and updated as appropriate: allergies, current medications, past family history, past medical history, past social history, past surgical history and problem list.      Review of Systems   Cardiovascular: Positive for chest pain and dyspnea on exertion. Negative for claudication, cyanosis, irregular heartbeat, leg swelling, near-syncope, orthopnea, palpitations, paroxysmal nocturnal dyspnea and syncope.   Respiratory: Positive for shortness of breath. Negative for cough, sputum production and wheezing.      family history includes Diabetes in her other, sister, sister, sister, sister, sister, and sister; Heart disease in her mother, other, sister, and sister; Hypertension in her mother, other, sister, and sister; Stroke in her mother.   reports that  has never smoked. she has never used smokeless tobacco. She reports that she does not drink alcohol or use drugs.  Allergies   Allergen Reactions   • Seroquel [Quetiapine Fumarate] Anaphylaxis   • Avandia [Rosiglitazone] Swelling   • Morphine And Related Hallucinations     If patient takes too much       Current Outpatient Medications:   •  amoxicillin (AMOXIL) 500 MG capsule, Take 1 capsule by mouth 3 (Three) Times a Day., Disp: 30 capsule, Rfl: 0  •  ARIPiprazole (ABILIFY) 15 MG tablet, TAKE 1 TABLET BY MOUTH DAILY., Disp: 30 tablet, Rfl: 5  •  aspirin 81 MG chewable tablet, Chew 81 mg daily., Disp: , Rfl:   •  atorvastatin (LIPITOR) 40 MG tablet, TAKE 1 TABLET BY MOUTH EVERY NIGHT., Disp: 90 tablet, Rfl: 1  •  BD SHARPS CONTAINER HOME misc, 1 each Take As Directed., Disp: 1 each, Rfl: 0  •  Blood Glucose Monitoring Suppl (ONE TOUCH ULTRA MINI) w/Device kit, USE AS DIRECTED TO CHECK BLOOD SUGAR, Disp: 1 each, Rfl: 0  •  Blood Glucose Monitoring Suppl (ONE TOUCH ULTRA MINI) w/Device kit, USE 4 TIMES A DAY, Disp: 1 each, Rfl: 0  •  Calcium Citrate-Vitamin D (CITRACAL/VITAMIN D) 250-200 MG-UNIT tablet, Take 2 tablets by mouth 2 (two) times  a day., Disp: , Rfl:   •  clopidogrel (PLAVIX) 75 MG tablet, TAKE 1 TABLET BY MOUTH DAILY., Disp: 90 tablet, Rfl: 3  •  cyanocobalamin 1000 MCG/ML injection, Inject 1 mL into the appropriate muscle as directed by prescriber Every 28 (Twenty-Eight) Days., Disp: 1 mL, Rfl: 12  •  fluconazole (DIFLUCAN) 200 MG tablet, Take one at the first sign of infection and may repeat in 3 days as needed., Disp: 2 tablet, Rfl: 0  •  furosemide (LASIX) 40 MG tablet, Take 1 tablet by mouth Daily., Disp: 90 tablet, Rfl: 3  •  gabapentin (NEURONTIN) 400 MG capsule, TAKE 1 CAPSULE BY MOUTH THREE TIMES A DAY, Disp: 90 capsule, Rfl: 2  •  GLUCAGON EMERGENCY 1 MG injection, USE AS DIRECTED AS NEEDED, Disp: 1 kit, Rfl: 0  •  glucose blood (ONE TOUCH ULTRA TEST) test strip, 1 each by Other route 4 (Four) Times a Day. Use as instructed, Disp: 400 each, Rfl: 1  •  hydrochlorothiazide (MICROZIDE) 12.5 MG capsule, TAKE 1 CAPSULE BY MOUTH DAILY., Disp: 90 capsule, Rfl: 0  •  HYDROcodone-acetaminophen (NORCO) 7.5-325 MG per tablet, Take 1 tablet by mouth Every 4 (Four) Hours As Needed for Moderate Pain ., Disp: 180 tablet, Rfl: 0  •  insulin aspart (NOVOLOG FLEXPEN) 100 UNIT/ML solution pen-injector sc pen, 60 units qac tid, Disp: 18 pen, Rfl: 11  •  Insulin Glargine (BASAGLAR KWIKPEN) 100 UNIT/ML injection pen, 100 units q hs, Disp: 5 pen, Rfl: 5  •  Insulin Pen Needle (B-D ULTRAFINE III SHORT PEN) 31G X 8 MM misc, Inject 1 each into the shoulder, thigh, or buttocks 4 (Four) Times a Day. use as directed, Disp: 150 each, Rfl: 11  •  LORazepam (ATIVAN) 0.5 MG tablet, Take 1 tablet by mouth Daily As Needed for Anxiety., Disp: 15 tablet, Rfl: 2  •  meclizine (ANTIVERT) 25 MG tablet, Take 1 tablet by mouth 3 (Three) Times a Day As Needed for dizziness., Disp: 90 tablet, Rfl: 6  •  meloxicam (MOBIC) 15 MG tablet, Take 1 tablet by mouth Daily. Take once daily., Disp: 30 tablet, Rfl: 0  •  metoclopramide (REGLAN) 10 MG tablet, Take 1 tablet by mouth 4  "(Four) Times a Day Before Meals & at Bedtime. (Patient taking differently: Take 10 mg by mouth 4 (Four) Times a Day As Needed.), Disp: 30 tablet, Rfl: 0  •  metoprolol succinate XL (TOPROL-XL) 25 MG 24 hr tablet, TAKE 1 TABLET BY MOUTH DAILY., Disp: 31 tablet, Rfl: 6  •  mirtazapine (REMERON) 45 MG tablet, TAKE 1 TABLET BY MOUTH EVERY NIGHT., Disp: 90 tablet, Rfl: 1  •  ONE TOUCH ULTRA TEST test strip, USE TO TEST SUGAR 4 TIMES A DAY, Disp: 100 each, Rfl: 3  •  pantoprazole (PROTONIX) 40 MG EC tablet, TAKE 1 TABLET BY MOUTH DAILY., Disp: 90 tablet, Rfl: 2  •  promethazine-dextromethorphan (PROMETHAZINE-DM) 6.25-15 MG/5ML syrup, Take 5 mL by mouth 4 (Four) Times a Day As Needed for Cough., Disp: 160 mL, Rfl: 0  •  SITagliptin (JANUVIA) 100 MG tablet, 100 mg by mouth  daily before breakfast, Disp: 30 tablet, Rfl: 11  •  Syringe/Needle, Disp, (LUER LOCK SAFETY SYRINGES) 25G X 5/8\" 3 ML misc, 1 each Daily., Disp: 100 each, Rfl: 0  •  trimethoprim-polymyxin b (POLYTRIM) 38990-7.1 UNIT/ML-% ophthalmic solution, Administer 1 drop to both eyes Every 4 (Four) Hours., Disp: 10 mL, Rfl: 0  •  venlafaxine XR (EFFEXOR-XR) 150 MG 24 hr capsule, TAKE ONE CAPSULE BY MOUTH TWICE A DAY FOR DEPRESSION, Disp: 180 capsule, Rfl: 3  •  vitamin D (ERGOCALCIFEROL) 67801 units capsule capsule, TAKE ONE CAPSULE BY MOUTH EVERY SUNDAY, Disp: 12 capsule, Rfl: 2    Current Facility-Administered Medications:   •  cyanocobalamin injection 1,000 mcg, 1,000 mcg, Intramuscular, Q28 Days, Francisca Fong MD, 1,000 mcg at 11/02/18 0817    Physical Exam:  Vitals:    12/17/18 0903   BP: 116/68   Pulse: 82   SpO2: 98%     Body mass index is 38.86 kg/m².   Last Weights:   Wt Readings from Last 3 Encounters:   12/14/18 117 kg (257 lb)   12/12/18 117 kg (257 lb)   12/01/18 117 kg (257 lb 3.2 oz)     Pulse Ox: Normal   General: alert, appears stated age and cooperative  Body Habitus: obese  HEENT: Head: Normocephalic, no lesions, without obvious " abnormality. No arcus senilis, xanthelasma or xanthomas.  JVP: 6 cm of water at 45 degrees   Volume/Pulsation: Normal  Heart rate: normal    Heart Rhythm: regular     Heart Sounds: S1: normal intensity  S2: normal intensity  S3: absent   S4: absent  Opening Snap: absent  A2-OS:  absent.   Pericardial rub: absent    Ejection click: None      Murmurs:  absent   Extremity: moves all extremities equally.       DATA REVIEWED:       TTE/LAMONTE:  Results for orders placed during the hospital encounter of 04/30/18   Adult Transthoracic Echo Complete W/ Cont if Necessary Per Protocol    Narrative · The quality of the study is limited due to poor acoustic windows related   to patient body habitus  · Left Ventricle: Estimated EF appears to be in the range of 51 - 55%.   Normal left ventricular cavity size noted  · There is moderate eccentric hypertrophy of the left ventricular cavity.   Left ventricular diastolic dysfunction is noted (grade II w/high LAP)   consistent with pseudonormalization.  · Right Ventricle: Normal right ventricular wall thickness, systolic   function and septal motion noted. Right ventricular cavity is borderline   dilated  · No evidence of a patent foramen ovale. No evidence of an atrial septal   defect present. Saline test results are negative.  · The aortic valve is abnormal with mild calcification of the right   coronary cusp.  · Mitral Valve: The mitral valve is abnormal in structure. There is   anterior leaflet thickening present. Mild mitral valve regurgitation is   present.  · Trace to mild tricuspid valve regurgitation is present. Estimated right   ventricular systolic pressure from tricuspid regurgitation is moderately   elevated (45-55 mmHg)  · Mild pulmonary hypertension is present.  · There is no evidence of pericardial effusion.          Lab Results   Component Value Date    GLUCOSE 232 (H) 11/28/2018    BUN 14 11/28/2018    CREATININE 0.85 11/28/2018    EGFRIFNONA 69 11/28/2018    BCR 16.5  11/28/2018    K 3.2 (L) 11/28/2018    CO2 28.0 11/28/2018    CALCIUM 9.1 11/28/2018    ALBUMIN 4.20 11/28/2018    AST 62 (H) 11/28/2018    ALT 25 11/28/2018     Lab Results   Component Value Date    WBC 11.61 (H) 11/28/2018    HGB 13.6 11/28/2018    HCT 42.0 11/28/2018    MCV 85.7 11/28/2018     (H) 11/28/2018     Lab Results   Component Value Date    CHOL 197 04/16/2018    CHLPL 211 (H) 01/19/2017    TRIG 223 (H) 04/16/2018    HDL 33 (L) 04/16/2018     04/16/2018     Lab Results   Component Value Date    TSH 3.950 07/21/2018    L8VNWOA 9.70 08/30/2017    THYROIDAB <6 01/02/2015     Lab Results   Component Value Date    CKTOTAL 38 02/24/2018    CKMB 1.00 02/24/2018    TROPONINI <0.012 07/22/2018     Lab Results   Component Value Date    HGBA1C 8.1 (H) 11/28/2018     Lab Results   Component Value Date    DDIMER 722 (H) 11/11/2016     Lab Results   Component Value Date    ALT 25 11/28/2018     Lab Results   Component Value Date    HGBA1C 8.1 (H) 11/28/2018    HGBA1C 6.4 07/27/2018    HGBA1C 7.8 (H) 04/16/2018     Lab Results   Component Value Date    MICROALBUR 12.0 07/20/2017    CREATININE 0.85 11/28/2018     Lab Results   Component Value Date    IRON 20 (L) 10/19/2017     Lab Results   Component Value Date    INR 1.02 07/21/2018    INR 0.99 04/30/2018    INR 0.95 10/14/2017    PROTIME 13.2 07/21/2018    PROTIME 12.9 04/30/2018    PROTIME 12.6 10/14/2017       Assessment/Plan      Diagnosis Plan   1. Pulmonary hypertension.  WHO-group-2/3.  Today, euvolemic and perfused.   · Monitoring of mitral regurgitation   · Avoid tobacco products   · CPAP compliance   · 6MWT PTNA   2. (HFpEF) heart failure with preserved ejection fraction (CMS/HCC).  NYHA stage C.  Functional class 2/3.  Today, euvolemic and perfused.   · Weigh daily; Call for concerning weight gain or concerning symptoms  · Continue current HFpEF medications  · Low sodium diet     3. Non-rheumatic mitral regurgitation.  ACC stage B.   Clinical  "surveillance with plans for repeat 2-D TTE 2021    4. Mixed hyperlipidemia    · Dietary changes: Increase soluble fiber  · Reduce saturated fat and cholesterol  · Exercise changes: Advised to engage in aerobic exercise on most days of the week  · Statin: Yes  · ASA: Yes  · Continue follow-up visits with PCP for monitoring of labs         5. Class 3 obesity due to excess calories without serious comorbidity with body mass index (BMI) of 40.0 to 44.9 in adult (CMS/[facility])  · General patient education:  -Weight loss hand-out  -Exercise intervention:   Hand out, increase aerobic activity  -PCP Follow-up             6. MV ASCAD, s/p CABG. myocardial perfusion scintigraphy in May 2018 without evidence of inducible ischemia.  She had some CP on Friday mainly with stress after she received a letter about her disability. It responded to nitro. She has not had recurrence.  She also asks me today to fill out disability paperwork.  I told her that she, in general, has stable  CAD and HFpEF, and that I would not be able to state that she was \"disabled.\"  She still requested for the paperwork to be completed.   · She will continue aspirin, Plavix, statin and Toprol-XL   · Call for concerning symptoms.   · Disability paperwork was completed with her most recent office visit information        Return for Next scheduled follow up.          "

## 2018-12-18 ENCOUNTER — OFFICE VISIT (OUTPATIENT)
Dept: ENDOCRINOLOGY | Facility: CLINIC | Age: 58
End: 2018-12-18

## 2018-12-18 VITALS
HEIGHT: 68 IN | HEART RATE: 99 BPM | DIASTOLIC BLOOD PRESSURE: 78 MMHG | SYSTOLIC BLOOD PRESSURE: 124 MMHG | WEIGHT: 257 LBS | BODY MASS INDEX: 38.95 KG/M2

## 2018-12-18 DIAGNOSIS — E11.42 DIABETIC PERIPHERAL NEUROPATHY ASSOCIATED WITH TYPE 2 DIABETES MELLITUS (HCC): ICD-10-CM

## 2018-12-18 DIAGNOSIS — IMO0002 UNCONTROLLED TYPE 2 DIABETES MELLITUS WITH NEUROLOGIC COMPLICATION, WITH LONG-TERM CURRENT USE OF INSULIN: Primary | ICD-10-CM

## 2018-12-18 DIAGNOSIS — E55.9 VITAMIN D DEFICIENCY: ICD-10-CM

## 2018-12-18 DIAGNOSIS — E78.2 MIXED HYPERLIPIDEMIA: ICD-10-CM

## 2018-12-18 PROCEDURE — 99214 OFFICE O/P EST MOD 30 MIN: CPT | Performed by: NURSE PRACTITIONER

## 2018-12-18 NOTE — PROGRESS NOTES
Subjective    Ariana Martinez is a 58 y.o. female. she is here today for follow-up.    History of Present Illness     Duration/Timing: Diabetes mellitus type 2, Age at onset of diabetes: 26 years years, Onset of symptoms gradual  timing - constant     qualtiy - uncontrolled     severity - moderate        -----------------------     Severity (Complications/Hospitalizations)  Secondary Macrovascular Complications: No CAD, No CVA, No PAD  Secondary Microvascular Complications: No Diabetic Nephropathy, No Diabetic Retinopathy, Diabetic Neuropathy     Context  Diabetes Regimen: Insulin, Oral Medications      Lab Results   Component Value Date    HGBA1C 8.1 (H) 11/28/2018                               Blood Glucose Readings     Now on dexcom sensor          states running high due to stress        Diet  variable carb intake  Exercise: Exercises  walking     Associated Signs/Symptoms  Hyperglycemic Symptoms: No polyuria, No polydipsia, No polyphagia, No weight gain  Hypoglycemic Episodes: Documented symptomatic hypoglycemia, Seizures and syncope related to hypoglycemia                          The following portions of the patient's history were reviewed and updated as appropriate:   Past Medical History:   Diagnosis Date   • Acquired hypothyroidism    • Angina, class IV (CMS/HCC)    • Anxiety    • Benign paroxysmal positional vertigo    • Carpal tunnel syndrome    • Chronic pain    • Coronary atherosclerosis    • Depression    • Diabetes mellitus (CMS/HCC)     Type 2, controlled   • Diabetic polyneuropathy (CMS/HCC)    • Elevated cholesterol    • Female stress incontinence    • Gastroparesis    • GERD (gastroesophageal reflux disease)    • Hashimoto's thyroiditis    • Hyperlipidemia    • Hypertension    • Low back pain    • Malaise and fatigue    • Multiple joint pain    • Obesity     Refuses to be weighed   • Otalgia     Both   • Perforation of tympanic membrane     Left   • Postoperative wound infection    • Vitamin  D deficiency      Past Surgical History:   Procedure Laterality Date   • ABDOMINAL SURGERY     • ANGIOPLASTY      coronary   • BREAST BIOPSY Right    • CARDIAC CATHETERIZATION     • CARPAL TUNNEL RELEASE     • CHOLECYSTECTOMY     • CORONARY ARTERY BYPASS GRAFT     • ENDOSCOPY AND COLONOSCOPY     • FOOT SURGERY      Toes   • GASTRIC BANDING      Revision, laparoscopic   • HYSTERECTOMY     • MOUTH SURGERY     • SALPINGO OOPHORECTOMY     • SHOULDER SURGERY     • TRANSESOPHAGEAL ECHOCARDIOGRAM (LAMONTE)      With color flow     Family History   Problem Relation Age of Onset   • Diabetes Other    • Heart disease Other    • Hypertension Other    • Heart disease Mother    • Stroke Mother    • Hypertension Mother    • Diabetes Sister    • Heart disease Sister    • Hypertension Sister    • Heart disease Sister    • Diabetes Sister    • Hypertension Sister    • Diabetes Sister    • Diabetes Sister    • Diabetes Sister    • Diabetes Sister      OB History      Para Term  AB Living    0 0 0 0 0 0    SAB TAB Ectopic Molar Multiple Live Births    0 0 0   0          Current Outpatient Medications   Medication Sig Dispense Refill   • amoxicillin (AMOXIL) 500 MG capsule Take 1 capsule by mouth 3 (Three) Times a Day. 30 capsule 0   • ARIPiprazole (ABILIFY) 15 MG tablet TAKE 1 TABLET BY MOUTH DAILY. 30 tablet 5   • aspirin 81 MG chewable tablet Chew 81 mg daily.     • atorvastatin (LIPITOR) 40 MG tablet TAKE 1 TABLET BY MOUTH EVERY NIGHT. 90 tablet 1   • BD SHARPS CONTAINER HOME misc 1 each Take As Directed. 1 each 0   • Blood Glucose Monitoring Suppl (ONE TOUCH ULTRA MINI) w/Device kit USE AS DIRECTED TO CHECK BLOOD SUGAR 1 each 0   • Blood Glucose Monitoring Suppl (ONE TOUCH ULTRA MINI) w/Device kit USE 4 TIMES A DAY 1 each 0   • Calcium Citrate-Vitamin D (CITRACAL/VITAMIN D) 250-200 MG-UNIT tablet Take 2 tablets by mouth 2 (two) times a day.     • clopidogrel (PLAVIX) 75 MG tablet TAKE 1 TABLET BY MOUTH DAILY. 90  tablet 3   • cyanocobalamin 1000 MCG/ML injection Inject 1 mL into the appropriate muscle as directed by prescriber Every 28 (Twenty-Eight) Days. 1 mL 12   • fluconazole (DIFLUCAN) 200 MG tablet Take one at the first sign of infection and may repeat in 3 days as needed. 2 tablet 0   • furosemide (LASIX) 40 MG tablet Take 1 tablet by mouth Daily. 90 tablet 3   • gabapentin (NEURONTIN) 400 MG capsule TAKE 1 CAPSULE BY MOUTH THREE TIMES A DAY 90 capsule 2   • GLUCAGON EMERGENCY 1 MG injection USE AS DIRECTED AS NEEDED 1 kit 0   • glucose blood (ONE TOUCH ULTRA TEST) test strip 1 each by Other route 4 (Four) Times a Day. Use as instructed 400 each 1   • hydrochlorothiazide (MICROZIDE) 12.5 MG capsule TAKE 1 CAPSULE BY MOUTH DAILY. 90 capsule 0   • HYDROcodone-acetaminophen (NORCO) 7.5-325 MG per tablet Take 1 tablet by mouth Every 4 (Four) Hours As Needed for Moderate Pain . 180 tablet 0   • insulin aspart (NOVOLOG FLEXPEN) 100 UNIT/ML solution pen-injector sc pen 60 units qac tid 18 pen 11   • Insulin Glargine (BASAGLAR KWIKPEN) 100 UNIT/ML injection pen 100 units q hs 5 pen 5   • Insulin Pen Needle (B-D ULTRAFINE III SHORT PEN) 31G X 8 MM misc Inject 1 each into the shoulder, thigh, or buttocks 4 (Four) Times a Day. use as directed 150 each 11   • LORazepam (ATIVAN) 0.5 MG tablet Take 1 tablet by mouth Daily As Needed for Anxiety. 15 tablet 2   • meclizine (ANTIVERT) 25 MG tablet Take 1 tablet by mouth 3 (Three) Times a Day As Needed for dizziness. 90 tablet 6   • meloxicam (MOBIC) 15 MG tablet Take 1 tablet by mouth Daily. Take once daily. 30 tablet 0   • metoclopramide (REGLAN) 10 MG tablet Take 1 tablet by mouth 4 (Four) Times a Day Before Meals & at Bedtime. (Patient taking differently: Take 10 mg by mouth 4 (Four) Times a Day As Needed.) 30 tablet 0   • metoprolol succinate XL (TOPROL-XL) 25 MG 24 hr tablet TAKE 1 TABLET BY MOUTH DAILY. 31 tablet 6   • mirtazapine (REMERON) 45 MG tablet TAKE 1 TABLET BY MOUTH  "EVERY NIGHT. 90 tablet 1   • ONE TOUCH ULTRA TEST test strip USE TO TEST SUGAR 4 TIMES A  each 3   • pantoprazole (PROTONIX) 40 MG EC tablet TAKE 1 TABLET BY MOUTH DAILY. 90 tablet 2   • promethazine-dextromethorphan (PROMETHAZINE-DM) 6.25-15 MG/5ML syrup Take 5 mL by mouth 4 (Four) Times a Day As Needed for Cough. 160 mL 0   • SITagliptin (JANUVIA) 100 MG tablet 100 mg by mouth  daily before breakfast 30 tablet 11   • Syringe/Needle, Disp, (LUER LOCK SAFETY SYRINGES) 25G X 5/8\" 3 ML misc 1 each Daily. 100 each 0   • trimethoprim-polymyxin b (POLYTRIM) 58784-7.1 UNIT/ML-% ophthalmic solution Administer 1 drop to both eyes Every 4 (Four) Hours. 10 mL 0   • venlafaxine XR (EFFEXOR-XR) 150 MG 24 hr capsule TAKE ONE CAPSULE BY MOUTH TWICE A DAY FOR DEPRESSION 180 capsule 3   • vitamin D (ERGOCALCIFEROL) 81243 units capsule capsule TAKE ONE CAPSULE BY MOUTH EVERY SUNDAY 12 capsule 2     Current Facility-Administered Medications   Medication Dose Route Frequency Provider Last Rate Last Dose   • cyanocobalamin injection 1,000 mcg  1,000 mcg Intramuscular Q28 Days Francisca Fong MD   1,000 mcg at 11/02/18 0817     Allergies   Allergen Reactions   • Seroquel [Quetiapine Fumarate] Anaphylaxis   • Avandia [Rosiglitazone] Swelling   • Morphine And Related Hallucinations     If patient takes too much     Social History     Socioeconomic History   • Marital status:      Spouse name: Not on file   • Number of children: Not on file   • Years of education: Not on file   • Highest education level: Not on file   Tobacco Use   • Smoking status: Never Smoker   • Smokeless tobacco: Never Used   Substance and Sexual Activity   • Alcohol use: No   • Drug use: No   • Sexual activity: Defer     Comment:        Review of Systems  Review of Systems   Constitutional: Negative for activity change, appetite change, diaphoresis and fatigue.   HENT: Negative for facial swelling, sneezing, sore throat, tinnitus, trouble " "swallowing and voice change.    Eyes: Negative for photophobia, pain, discharge, redness, itching and visual disturbance.   Respiratory: Negative for apnea, cough, choking, chest tightness and shortness of breath.    Cardiovascular: Negative for chest pain, palpitations and leg swelling.   Gastrointestinal: Negative for abdominal distention, abdominal pain, constipation, diarrhea, nausea and vomiting.   Endocrine: Negative for cold intolerance, heat intolerance, polydipsia, polyphagia and polyuria.   Genitourinary: Negative for difficulty urinating, dysuria, frequency, hematuria and urgency.   Musculoskeletal: Negative for arthralgias, back pain, gait problem, joint swelling, myalgias, neck pain and neck stiffness.   Skin: Negative for color change, pallor, rash and wound.   Neurological: Negative for dizziness, tremors, weakness, light-headedness, numbness and headaches.   Hematological: Negative for adenopathy. Does not bruise/bleed easily.   Psychiatric/Behavioral: Negative for behavioral problems, confusion and sleep disturbance.        Objective    /78 (BP Location: Left arm, Patient Position: Sitting, Cuff Size: Adult)   Pulse 99   Ht 172.7 cm (68\")   Wt 117 kg (257 lb)   BMI 39.08 kg/m²   Physical Exam   Constitutional: She is oriented to person, place, and time. She appears well-developed and well-nourished. No distress.   HENT:   Head: Normocephalic and atraumatic.   Right Ear: External ear normal.   Left Ear: External ear normal.   Nose: Nose normal.   Eyes: Conjunctivae and EOM are normal. Pupils are equal, round, and reactive to light.   Neck: Normal range of motion. Neck supple. No tracheal deviation present. No thyromegaly present.   Cardiovascular: Normal rate, regular rhythm and normal heart sounds.   No murmur heard.  Pulmonary/Chest: Effort normal and breath sounds normal. No respiratory distress. She has no wheezes.   Abdominal: Soft. Bowel sounds are normal. There is no tenderness. " There is no rebound and no guarding.   Musculoskeletal: Normal range of motion. She exhibits no edema, tenderness or deformity.    Ariana had a diabetic foot exam performed today.   During the foot exam she had a monofilament test performed.    Neurological Sensory Findings -  Altered sharp/dull right ankle/foot discrimination and altered sharp/dull left ankle/foot discrimination. Right ankle/foot altered proprioception and left ankle/foot altered proprioception.  Vascular Status -  Her right foot exhibits no edema. Her left foot exhibits no edema.  Skin Integrity  -  She has right heel is dry and cracked.She has left heel dry and cracked..  Neurological: She is alert and oriented to person, place, and time. No cranial nerve deficit.   Skin: Skin is warm and dry. No rash noted.   Psychiatric: She has a normal mood and affect. Her behavior is normal. Judgment and thought content normal.       Lab Review  Glucose (mg/dL)   Date Value   11/28/2018 232 (H)   07/23/2018 136 (H)   07/22/2018 145 (H)     Glucose, Arterial (mmol/L)   Date Value   10/14/2017 155     Sodium (mmol/L)   Date Value   11/28/2018 137   07/23/2018 141   07/22/2018 139     Potassium (mmol/L)   Date Value   11/28/2018 3.2 (L)   07/23/2018 3.5   07/22/2018 3.7     Chloride (mmol/L)   Date Value   11/28/2018 99   07/23/2018 107   07/22/2018 106     CO2 (mmol/L)   Date Value   11/28/2018 28.0   07/23/2018 29.0   07/22/2018 27.0     BUN (mg/dL)   Date Value   11/28/2018 14   07/23/2018 9   07/22/2018 11     Creatinine (mg/dL)   Date Value   11/28/2018 0.85   07/23/2018 0.81   07/22/2018 0.71     Hemoglobin A1C (%)   Date Value   11/28/2018 8.1 (H)   07/27/2018 6.4   04/16/2018 7.8 (H)   01/18/2018 8.2 (H)     Triglycerides (mg/dL)   Date Value   04/16/2018 223 (H)   01/18/2018 344 (H)   07/20/2017 389 (H)     LDL Cholesterol  (mg/dL)   Date Value   04/16/2018 108   01/18/2018 130 (H)   07/20/2017 119       Assessment/Plan      1. Uncontrolled type 2  diabetes mellitus with neurologic complication, with long-term current use of insulin (CMS/[facility])    2. Vitamin D deficiency    3. Mixed hyperlipidemia    4. Diabetic peripheral neuropathy associated with type 2 diabetes mellitus (CMS/[facility])    .    Medications prescribed:  Outpatient Encounter Medications as of 12/18/2018   Medication Sig Dispense Refill   • amoxicillin (AMOXIL) 500 MG capsule Take 1 capsule by mouth 3 (Three) Times a Day. 30 capsule 0   • ARIPiprazole (ABILIFY) 15 MG tablet TAKE 1 TABLET BY MOUTH DAILY. 30 tablet 5   • aspirin 81 MG chewable tablet Chew 81 mg daily.     • atorvastatin (LIPITOR) 40 MG tablet TAKE 1 TABLET BY MOUTH EVERY NIGHT. 90 tablet 1   • BD SHARPS CONTAINER HOME misc 1 each Take As Directed. 1 each 0   • Blood Glucose Monitoring Suppl (ONE TOUCH ULTRA MINI) w/Device kit USE AS DIRECTED TO CHECK BLOOD SUGAR 1 each 0   • Blood Glucose Monitoring Suppl (ONE TOUCH ULTRA MINI) w/Device kit USE 4 TIMES A DAY 1 each 0   • Calcium Citrate-Vitamin D (CITRACAL/VITAMIN D) 250-200 MG-UNIT tablet Take 2 tablets by mouth 2 (two) times a day.     • clopidogrel (PLAVIX) 75 MG tablet TAKE 1 TABLET BY MOUTH DAILY. 90 tablet 3   • cyanocobalamin 1000 MCG/ML injection Inject 1 mL into the appropriate muscle as directed by prescriber Every 28 (Twenty-Eight) Days. 1 mL 12   • fluconazole (DIFLUCAN) 200 MG tablet Take one at the first sign of infection and may repeat in 3 days as needed. 2 tablet 0   • furosemide (LASIX) 40 MG tablet Take 1 tablet by mouth Daily. 90 tablet 3   • gabapentin (NEURONTIN) 400 MG capsule TAKE 1 CAPSULE BY MOUTH THREE TIMES A DAY 90 capsule 2   • GLUCAGON EMERGENCY 1 MG injection USE AS DIRECTED AS NEEDED 1 kit 0   • glucose blood (ONE TOUCH ULTRA TEST) test strip 1 each by Other route 4 (Four) Times a Day. Use as instructed 400 each 1   • hydrochlorothiazide (MICROZIDE) 12.5 MG capsule TAKE 1 CAPSULE BY MOUTH DAILY. 90 capsule 0   • HYDROcodone-acetaminophen (NORCO) 7.5-325  "MG per tablet Take 1 tablet by mouth Every 4 (Four) Hours As Needed for Moderate Pain . 180 tablet 0   • insulin aspart (NOVOLOG FLEXPEN) 100 UNIT/ML solution pen-injector sc pen 60 units qac tid 18 pen 11   • Insulin Glargine (BASAGLAR KWIKPEN) 100 UNIT/ML injection pen 100 units q hs 5 pen 5   • Insulin Pen Needle (B-D ULTRAFINE III SHORT PEN) 31G X 8 MM misc Inject 1 each into the shoulder, thigh, or buttocks 4 (Four) Times a Day. use as directed 150 each 11   • LORazepam (ATIVAN) 0.5 MG tablet Take 1 tablet by mouth Daily As Needed for Anxiety. 15 tablet 2   • meclizine (ANTIVERT) 25 MG tablet Take 1 tablet by mouth 3 (Three) Times a Day As Needed for dizziness. 90 tablet 6   • meloxicam (MOBIC) 15 MG tablet Take 1 tablet by mouth Daily. Take once daily. 30 tablet 0   • metoclopramide (REGLAN) 10 MG tablet Take 1 tablet by mouth 4 (Four) Times a Day Before Meals & at Bedtime. (Patient taking differently: Take 10 mg by mouth 4 (Four) Times a Day As Needed.) 30 tablet 0   • metoprolol succinate XL (TOPROL-XL) 25 MG 24 hr tablet TAKE 1 TABLET BY MOUTH DAILY. 31 tablet 6   • mirtazapine (REMERON) 45 MG tablet TAKE 1 TABLET BY MOUTH EVERY NIGHT. 90 tablet 1   • ONE TOUCH ULTRA TEST test strip USE TO TEST SUGAR 4 TIMES A  each 3   • pantoprazole (PROTONIX) 40 MG EC tablet TAKE 1 TABLET BY MOUTH DAILY. 90 tablet 2   • promethazine-dextromethorphan (PROMETHAZINE-DM) 6.25-15 MG/5ML syrup Take 5 mL by mouth 4 (Four) Times a Day As Needed for Cough. 160 mL 0   • SITagliptin (JANUVIA) 100 MG tablet 100 mg by mouth  daily before breakfast 30 tablet 11   • Syringe/Needle, Disp, (LUER LOCK SAFETY SYRINGES) 25G X 5/8\" 3 ML misc 1 each Daily. 100 each 0   • trimethoprim-polymyxin b (POLYTRIM) 82618-6.1 UNIT/ML-% ophthalmic solution Administer 1 drop to both eyes Every 4 (Four) Hours. 10 mL 0   • venlafaxine XR (EFFEXOR-XR) 150 MG 24 hr capsule TAKE ONE CAPSULE BY MOUTH TWICE A DAY FOR DEPRESSION 180 capsule 3   • vitamin " D (ERGOCALCIFEROL) 47868 units capsule capsule TAKE ONE CAPSULE BY MOUTH EVERY SUNDAY 12 capsule 2     Facility-Administered Encounter Medications as of 12/18/2018   Medication Dose Route Frequency Provider Last Rate Last Dose   • cyanocobalamin injection 1,000 mcg  1,000 mcg Intramuscular Q28 Days Francisca Fong MD   1,000 mcg at 11/02/18 0817       Orders placed during this encounter include:  No orders of the defined types were placed in this encounter.    Glycemic Management     Lab Results   Component Value Date    HGBA1C 8.1 (H) 11/28/2018                Dexcom sensor ---            Basaglar taking 100 units         ==================        Novolog      Taking 60 units --- increase to 65 units to 70 units      Discussed carb counting but states cannot     She will need to look at her meals because 30 units may not be enough for some meals and for others meals to strong     If you eat a meal and give 30 units and your sugar is 200 or higher before the next meal look back at that meal and the next time you eat it either give more insulin or eat less     Also if you have a low after the meal give less insulin the next time you eat that meal         ==================     Jardiance 10 mg tablet , take before breakfast---will stop due to dizziness for possible volume depletion-----the dizziness has resolved since stopping jardiance        ==================     Metformin 500 mg tablets - caused diarrhea --stopped      ====================     start bydureon - stopped        Victoza stopped due to side effects      Warned of the gastric side effects she does have gastroparesis but she wants to try      Gave a sample she will let me know if she wants an RX called in      If vomiting or abdominal pain stop        januvia 100 mg daily - taking      ------------------------------      Lipid Management        on Lipitor   on fenofibrate                 LDL needs to be 70 or less     add zetia     Also discussed  restarting the jardiance for the heart benefits but refuses     Component      Latest Ref Rng & Units 4/16/2018   Total Cholesterol      0 - 199 mg/dL 197   Triglycerides      20 - 199 mg/dL 223 (H)   HDL Cholesterol      60 - 200 mg/dL 33 (L)   LDL Cholesterol       1 - 129 mg/dL 108   LDL/HDL Ratio      0.00 - 3.22 3.62 (H)               Blood Pressure Management: Control of blood pressure  on ACEi     stopped the lasix         Microvascular Complication Monitoring: Neuropathy type sensorial     Neurontin 400 mg po TID -- moderate relief but not complete         intermittetly takes reglan for gastroparesis     states eye exam ws 2018  RX for shoes - diabetic neuropathy      Immunization - last influenza vaccine Oct. 2017         Other Diabetes Related Aspects     TSH abnormal from July to Sept 2014     TSH in the 5 to 7 range                 she refuses thyroid medicaton                      Lab Results   Component Value Date     TSH 4.650 04/16/2018                   Lab Results   Component Value Date     TSH 3.950 07/21/2018         ---     3-15     B12 low      now b12 shots     June 2015     B12- 968     Vitamin d def        Vitamin d - 26      Continue vitamin d supplement      April 2018     Vitamin d -28         Referral to GI      Reason - diarrhea chronic      History of gastroparesis- -takes reglan            4. Follow-up: Return for Recheck.

## 2018-12-26 ENCOUNTER — TELEPHONE (OUTPATIENT)
Dept: FAMILY MEDICINE CLINIC | Facility: CLINIC | Age: 58
End: 2018-12-26

## 2018-12-26 DIAGNOSIS — G89.29 CHRONIC PAIN OF MULTIPLE JOINTS: ICD-10-CM

## 2018-12-26 DIAGNOSIS — M25.50 CHRONIC PAIN OF MULTIPLE JOINTS: ICD-10-CM

## 2018-12-26 DIAGNOSIS — E53.8 CYANOCOBALAMIN DEFICIENCY: ICD-10-CM

## 2018-12-26 RX ORDER — HYDROCODONE BITARTRATE AND ACETAMINOPHEN 7.5; 325 MG/1; MG/1
1 TABLET ORAL EVERY 4 HOURS PRN
Qty: 42 TABLET | Refills: 0 | Status: SHIPPED | OUTPATIENT
Start: 2018-12-26 | End: 2018-12-31 | Stop reason: SDUPTHER

## 2018-12-26 RX ORDER — CYANOCOBALAMIN 1000 UG/ML
1000 INJECTION, SOLUTION INTRAMUSCULAR; SUBCUTANEOUS
Qty: 1 ML | Refills: 0 | Status: SHIPPED | OUTPATIENT
Start: 2018-12-26 | End: 2019-09-10 | Stop reason: SDUPTHER

## 2018-12-31 DIAGNOSIS — M25.50 CHRONIC PAIN OF MULTIPLE JOINTS: ICD-10-CM

## 2018-12-31 DIAGNOSIS — G89.29 CHRONIC PAIN OF MULTIPLE JOINTS: ICD-10-CM

## 2018-12-31 RX ORDER — HYDROCODONE BITARTRATE AND ACETAMINOPHEN 7.5; 325 MG/1; MG/1
1 TABLET ORAL EVERY 4 HOURS PRN
Qty: 138 TABLET | Refills: 0 | Status: SHIPPED | OUTPATIENT
Start: 2018-12-31 | End: 2019-01-16 | Stop reason: HOSPADM

## 2019-01-03 RX ORDER — MELOXICAM 15 MG/1
TABLET ORAL
Qty: 30 TABLET | Refills: 0 | OUTPATIENT
Start: 2019-01-03

## 2019-01-04 ENCOUNTER — OFFICE VISIT (OUTPATIENT)
Dept: PODIATRY | Facility: CLINIC | Age: 59
End: 2019-01-04

## 2019-01-04 ENCOUNTER — TELEPHONE (OUTPATIENT)
Dept: PODIATRY | Facility: CLINIC | Age: 59
End: 2019-01-04

## 2019-01-04 VITALS — HEIGHT: 68 IN | WEIGHT: 257 LBS | BODY MASS INDEX: 38.95 KG/M2

## 2019-01-04 DIAGNOSIS — E11.42 DIABETIC POLYNEUROPATHY ASSOCIATED WITH TYPE 2 DIABETES MELLITUS (HCC): ICD-10-CM

## 2019-01-04 DIAGNOSIS — S92.352K DISPLACED FRACTURE OF FIFTH METATARSAL BONE, LEFT FOOT, SUBSEQUENT ENCOUNTER FOR FRACTURE WITH NONUNION: Primary | ICD-10-CM

## 2019-01-04 DIAGNOSIS — Q66.70 PES CAVUS: ICD-10-CM

## 2019-01-04 DIAGNOSIS — M24.573 EQUINUS CONTRACTURE OF ANKLE: ICD-10-CM

## 2019-01-04 PROCEDURE — 99214 OFFICE O/P EST MOD 30 MIN: CPT | Performed by: PODIATRIST

## 2019-01-04 RX ORDER — BUPIVACAINE HCL/0.9 % NACL/PF 0.1 %
2 PLASTIC BAG, INJECTION (ML) EPIDURAL ONCE
Status: CANCELLED | OUTPATIENT
Start: 2019-01-16 | End: 2019-01-04

## 2019-01-04 NOTE — TELEPHONE ENCOUNTER
Called patient to let her know that her pre-op appointment is going to be on 1/11/19 at 1:00pm. Please inform patient of this if she calls back while I am not here.

## 2019-01-04 NOTE — PROGRESS NOTES
Ariana Luis Martinez  1960  59 y.o. female  PCP: Dr. Fong last seen 11/07/2018.  BS: 74 per patient     Patient presents to clinic today for a follow up on left foot pain she states she has had no improvement.   01/04/2019    Chief Complaint   Patient presents with   • Left Foot - Follow-up, Pain           History of Present Illness    Ms Martinez is a 57 y/o diabetic female who presents for f/u evaluation of plantar lateral left foot pain.  Has history of open reduction internal fixation fifth metatarsal base fracture. Now with hypertrophic non-union.  Previously treated with immobilization. Patient notes no improvement of symptoms.    Past Medical History:   Diagnosis Date   • Acquired hypothyroidism    • Angina, class IV (CMS/HCC)    • Anxiety    • Benign paroxysmal positional vertigo    • Carpal tunnel syndrome    • Chronic pain    • Coronary atherosclerosis    • Depression    • Diabetes mellitus (CMS/HCC)     Type 2, controlled   • Diabetic polyneuropathy (CMS/HCC)    • Elevated cholesterol    • Female stress incontinence    • Gastroparesis    • GERD (gastroesophageal reflux disease)    • Hashimoto's thyroiditis    • Hyperlipidemia    • Hypertension    • Low back pain    • Malaise and fatigue    • Multiple joint pain    • Obesity     Refuses to be weighed   • Otalgia     Both   • Perforation of tympanic membrane     Left   • Postoperative wound infection    • Vitamin D deficiency          Past Surgical History:   Procedure Laterality Date   • ABDOMINAL SURGERY     • ANGIOPLASTY      coronary   • BREAST BIOPSY Right    • CARDIAC CATHETERIZATION     • CARDIAC CATHETERIZATION N/A 6/20/2017    Procedure: Right Heart Cath;  Surgeon: Can Kwon MD PhD;  Location: [facility] INVASIVE LOCATION;  Service:    • CARPAL TUNNEL RELEASE     • CHOLECYSTECTOMY     • CORONARY ARTERY BYPASS GRAFT  2005   • ENDOSCOPY N/A 10/19/2018    Procedure: ESOPHAGOGASTRODUODENOSCOPY possible dilation;  Surgeon: Julián Maldonado  MD NAHEED;  Location: [facility] ENDOSCOPY;  Service: Gastroenterology   • ENDOSCOPY AND COLONOSCOPY     • FOOT SURGERY      Toes   • GASTRIC BANDING      Revision, laparoscopic   • HYSTERECTOMY     • MOUTH SURGERY     • SALPINGO OOPHORECTOMY     • SHOULDER SURGERY     • TRANSESOPHAGEAL ECHOCARDIOGRAM (LAMONTE)      With color flow         Family History   Problem Relation Age of Onset   • Diabetes Other    • Heart disease Other    • Hypertension Other    • Heart disease Mother    • Stroke Mother    • Hypertension Mother    • Diabetes Sister    • Heart disease Sister    • Hypertension Sister    • Heart disease Sister    • Diabetes Sister    • Hypertension Sister    • Diabetes Sister    • Diabetes Sister    • Diabetes Sister    • Diabetes Sister          Social History     Socioeconomic History   • Marital status:      Spouse name: Not on file   • Number of children: Not on file   • Years of education: Not on file   • Highest education level: Not on file   Social Needs   • Financial resource strain: Not on file   • Food insecurity - worry: Not on file   • Food insecurity - inability: Not on file   • Transportation needs - medical: Not on file   • Transportation needs - non-medical: Not on file   Occupational History   • Not on file   Tobacco Use   • Smoking status: Never Smoker   • Smokeless tobacco: Never Used   Substance and Sexual Activity   • Alcohol use: No   • Drug use: No   • Sexual activity: Defer     Comment:    Other Topics Concern   • Not on file   Social History Narrative   • Not on file         Current Outpatient Medications   Medication Sig Dispense Refill   • amoxicillin (AMOXIL) 500 MG capsule Take 1 capsule by mouth 3 (Three) Times a Day. 30 capsule 0   • ARIPiprazole (ABILIFY) 15 MG tablet TAKE 1 TABLET BY MOUTH DAILY. 30 tablet 5   • aspirin 81 MG chewable tablet Chew 81 mg daily.     • atorvastatin (LIPITOR) 40 MG tablet TAKE 1 TABLET BY MOUTH EVERY NIGHT. 90 tablet 1   • BD SHARPS CONTAINER  HOME misc 1 each Take As Directed. 1 each 0   • Blood Glucose Monitoring Suppl (ONE TOUCH ULTRA MINI) w/Device kit USE AS DIRECTED TO CHECK BLOOD SUGAR 1 each 0   • Blood Glucose Monitoring Suppl (ONE TOUCH ULTRA MINI) w/Device kit USE 4 TIMES A DAY 1 each 0   • Calcium Citrate-Vitamin D (CITRACAL/VITAMIN D) 250-200 MG-UNIT tablet Take 2 tablets by mouth 2 (two) times a day.     • clopidogrel (PLAVIX) 75 MG tablet TAKE 1 TABLET BY MOUTH DAILY. 90 tablet 3   • cyanocobalamin 1000 MCG/ML injection Inject 1 mL into the appropriate muscle as directed by prescriber Every 28 (Twenty-Eight) Days. 1 mL 0   • fluconazole (DIFLUCAN) 200 MG tablet Take one at the first sign of infection and may repeat in 3 days as needed. 2 tablet 0   • furosemide (LASIX) 40 MG tablet Take 1 tablet by mouth Daily. 90 tablet 3   • gabapentin (NEURONTIN) 400 MG capsule TAKE 1 CAPSULE BY MOUTH THREE TIMES A DAY 90 capsule 2   • GLUCAGON EMERGENCY 1 MG injection USE AS DIRECTED AS NEEDED 1 kit 0   • glucose blood (ONE TOUCH ULTRA TEST) test strip 1 each by Other route 4 (Four) Times a Day. Use as instructed 400 each 1   • hydrochlorothiazide (MICROZIDE) 12.5 MG capsule TAKE 1 CAPSULE BY MOUTH DAILY. 90 capsule 0   • HYDROcodone-acetaminophen (NORCO) 7.5-325 MG per tablet Take 1 tablet by mouth Every 4 (Four) Hours As Needed for Moderate Pain . 138 tablet 0   • insulin aspart (NOVOLOG FLEXPEN) 100 UNIT/ML solution pen-injector sc pen 60 units qac tid 18 pen 11   • Insulin Glargine (BASAGLAR KWIKPEN) 100 UNIT/ML injection pen 100 units q hs 5 pen 5   • Insulin Pen Needle (B-D ULTRAFINE III SHORT PEN) 31G X 8 MM misc Inject 1 each into the shoulder, thigh, or buttocks 4 (Four) Times a Day. use as directed 150 each 11   • LORazepam (ATIVAN) 0.5 MG tablet Take 1 tablet by mouth Daily As Needed for Anxiety. 15 tablet 2   • meclizine (ANTIVERT) 25 MG tablet Take 1 tablet by mouth 3 (Three) Times a Day As Needed for dizziness. 90 tablet 6   • meloxicam  "(MOBIC) 15 MG tablet Take 1 tablet by mouth Daily. Take once daily. 30 tablet 0   • metoclopramide (REGLAN) 10 MG tablet Take 1 tablet by mouth 4 (Four) Times a Day Before Meals & at Bedtime. (Patient taking differently: Take 10 mg by mouth 4 (Four) Times a Day As Needed.) 30 tablet 0   • metoprolol succinate XL (TOPROL-XL) 25 MG 24 hr tablet TAKE 1 TABLET BY MOUTH DAILY. 31 tablet 6   • mirtazapine (REMERON) 45 MG tablet TAKE 1 TABLET BY MOUTH EVERY NIGHT. 90 tablet 1   • ONE TOUCH ULTRA TEST test strip USE TO TEST SUGAR 4 TIMES A  each 3   • pantoprazole (PROTONIX) 40 MG EC tablet TAKE 1 TABLET BY MOUTH DAILY. 90 tablet 2   • promethazine-dextromethorphan (PROMETHAZINE-DM) 6.25-15 MG/5ML syrup Take 5 mL by mouth 4 (Four) Times a Day As Needed for Cough. 160 mL 0   • SITagliptin (JANUVIA) 100 MG tablet 100 mg by mouth  daily before breakfast 30 tablet 11   • Syringe/Needle, Disp, (LUER LOCK SAFETY SYRINGES) 25G X 5/8\" 3 ML misc 1 each Daily. 100 each 0   • trimethoprim-polymyxin b (POLYTRIM) 14936-7.1 UNIT/ML-% ophthalmic solution Administer 1 drop to both eyes Every 4 (Four) Hours. 10 mL 0   • venlafaxine XR (EFFEXOR-XR) 150 MG 24 hr capsule TAKE ONE CAPSULE BY MOUTH TWICE A DAY FOR DEPRESSION 180 capsule 3   • vitamin D (ERGOCALCIFEROL) 76695 units capsule capsule TAKE ONE CAPSULE BY MOUTH EVERY SUNDAY 12 capsule 2     Current Facility-Administered Medications   Medication Dose Route Frequency Provider Last Rate Last Dose   • cyanocobalamin injection 1,000 mcg  1,000 mcg Intramuscular Q28 Days Francisca Fong MD   1,000 mcg at 11/02/18 0817         OBJECTIVE    Ht 172.7 cm (68\")   Wt 117 kg (257 lb)   BMI 39.08 kg/m²       Review of Systems   Constitutional:  Denies recent weight loss, weight gain, fever or chills, no change in exercise tolerance  Musculoskeletal: Foot pain. Hammertoe deformity.   Skin:  Dry skin.  Neurological:  Burning sensations to feet b/l.  Psychiatric/Behavioral: Denies " depression    Physical Exam   Constitutional: she appears well-developed and well-nourished.   Psychiatric: she has a normal mood and affect. her   behavior is normal.      Lower Extremity Exam:  Vascular: DP pulses palpable 2+. PT not readily palpable. +signal on doppler  Negative hair growth.   Mild perimalleolar/left dorsal foot edema  Neuro: Protective sensation absent to foot, b/l. Reestablished at mid calf   DTRs intact  Vibratory sensation diminished.  Integument: No open wounds or lesions.  Diffuse xerosis b/l.  Webspaces c/d/i  Musculoskeletal: LE muscle strength 4/5.   Gait normal, in DM shoes  Semi-rigid, mild hammertoe deformity toes 2-5 b/l.  Nails 1-5 b/l wnl of length and thickness  Ankle ROM decreased without pain or crepitus  Resting calcaneal stance position in mild varus.  Mild pes cavus.  Tenderness palpation fifth metatarsal base, left    CT left foot without contrast.     CLINICAL INDICATION: Fracture fifth metatarsal. Evaluate for  nonunion or delayed union      COMPARISON: Left foot November 19, 2018.        TECHNIQUE: Noncontrast helical scanning with axial and coronal  reformations. Soft tissue, lung, and bone windows reviewed.     This exam was performed according to our departmental  dose-optimization program, which includes automated exposure  control, adjustment of the mA and/or kV according to patient size  and/or use of iterative reconstruction technique.     Findings: There is a fracture of the proximal shaft of fifth  metatarsal stabilized with internal fixation by a longitudinal  surgical screw. The distal aspect of the surgical screw extends  out of the cortex of the midshaft in a dorsal fashion.     There is partial bony fusion across the fracture line especially  in the dorsal aspect. The majority however of the fracture is  unfused, delayed union.     IMPRESSION:  CONCLUSION: As above.     Electronically signed by:  Naveed Taylor MD  11/27/2018 5:19 PM CST  Workstation:  [id number]    Procedures        ASSESSMENT AND PLAN    Ariana was seen today for follow-up and pain.    Diagnoses and all orders for this visit:    Displaced fracture of fifth metatarsal bone, left foot, subsequent encounter for fracture with nonunion  -     Case Request  -     ceFAZolin (ANCEF) 2 g in Sodium chloride 0.9 % 100 mL IVPB; Infuse 2 g into a venous catheter 1 (One) Time.    Pes cavus  -     Case Request  -     ceFAZolin (ANCEF) 2 g in Sodium chloride 0.9 % 100 mL IVPB; Infuse 2 g into a venous catheter 1 (One) Time.    Diabetic polyneuropathy associated with type 2 diabetes mellitus (CMS/[facility])    Equinus contracture of ankle    Other orders  -     Follow Anesthesia Guidelines / Standing Orders; Future  -     Obtain informed consent; Future  -     Follow Anesthesia Guidelines / Standing Orders; Standing  -     Verify NPO Status; Standing  -     Obtain informed consent (if not collected inpatient or PAT); Standing  -     Radiology Technician to Be Present for Intra-Op C-Arm Use; Standing      -Comprehensive DM foot exam performed. Pt educated on importance of tight glucose control and daily foot checks.   -CT reviewed with patient, displays partial union of fracture site without hardware failure.  At at length discussion with patient regarding difficulty of discerning pain from incomplete healing of fracture versus lateral column overload due to cavus foot type.  -Symptoms unchanged following immobilization and NDAIDs  -Discussed any surgical intervention would include revision of ORIF, along with calcaneal osteotomy to help offload lateral column. Patient now interested in this option. All risks, benefits and potential complications discussed including but not limited to delayed healing, infection, continued pain, nonunion, need for additional surgery, DVT/PE  -Plan for 1/16  -Follow up 3 weeks          This document has been electronically signed by Ignacio Lord DPM on January 6, 2019 6:53 PM      Ignacio Lord DPM  1/6/2019  6:53 PM

## 2019-01-10 PROBLEM — S92.352K: Status: ACTIVE | Noted: 2019-01-04

## 2019-01-10 PROBLEM — Q66.70 PES CAVUS: Status: ACTIVE | Noted: 2019-01-04

## 2019-01-11 ENCOUNTER — APPOINTMENT (OUTPATIENT)
Dept: PREADMISSION TESTING | Facility: HOSPITAL | Age: 59
End: 2019-01-11

## 2019-01-11 VITALS
DIASTOLIC BLOOD PRESSURE: 84 MMHG | OXYGEN SATURATION: 95 % | RESPIRATION RATE: 16 BRPM | SYSTOLIC BLOOD PRESSURE: 120 MMHG | HEART RATE: 89 BPM | BODY MASS INDEX: 38.95 KG/M2 | HEIGHT: 68 IN | WEIGHT: 257 LBS

## 2019-01-11 LAB — MRSA DNA SPEC QL NAA+PROBE: NEGATIVE

## 2019-01-11 PROCEDURE — 87641 MR-STAPH DNA AMP PROBE: CPT | Performed by: PODIATRIST

## 2019-01-11 PROCEDURE — 36415 COLL VENOUS BLD VENIPUNCTURE: CPT

## 2019-01-11 RX ORDER — SODIUM CHLORIDE 9 MG/ML
1000 INJECTION, SOLUTION INTRAVENOUS CONTINUOUS
Status: CANCELLED | OUTPATIENT
Start: 2019-01-16

## 2019-01-11 RX ORDER — INSULIN GLARGINE 100 [IU]/ML
100 INJECTION, SOLUTION SUBCUTANEOUS NIGHTLY
COMMUNITY
End: 2019-02-20 | Stop reason: SDUPTHER

## 2019-01-11 RX ORDER — ERGOCALCIFEROL 1.25 MG/1
50000 CAPSULE ORAL WEEKLY
COMMUNITY
End: 2019-12-26

## 2019-01-11 RX ORDER — VENLAFAXINE HYDROCHLORIDE 150 MG/1
150 CAPSULE, EXTENDED RELEASE ORAL 2 TIMES DAILY
COMMUNITY
End: 2019-06-21 | Stop reason: SDUPTHER

## 2019-01-11 RX ORDER — PANTOPRAZOLE SODIUM 40 MG/1
40 TABLET, DELAYED RELEASE ORAL DAILY
COMMUNITY
End: 2019-03-13 | Stop reason: SDUPTHER

## 2019-01-11 RX ORDER — ARIPIPRAZOLE 15 MG/1
15 TABLET ORAL DAILY
COMMUNITY
End: 2019-02-06 | Stop reason: SDUPTHER

## 2019-01-11 RX ORDER — GABAPENTIN 400 MG/1
400 CAPSULE ORAL 3 TIMES DAILY
COMMUNITY
End: 2019-04-29 | Stop reason: SDUPTHER

## 2019-01-11 RX ORDER — METOCLOPRAMIDE 10 MG/1
10 TABLET ORAL 4 TIMES DAILY PRN
COMMUNITY
End: 2020-06-30 | Stop reason: SDUPTHER

## 2019-01-11 RX ORDER — CLOPIDOGREL BISULFATE 75 MG/1
75 TABLET ORAL DAILY
COMMUNITY
End: 2020-02-03

## 2019-01-11 RX ORDER — HYDROCHLOROTHIAZIDE 12.5 MG/1
12.5 CAPSULE, GELATIN COATED ORAL DAILY
COMMUNITY
End: 2019-06-06

## 2019-01-11 NOTE — PAT
Chlorhexidine scrub given with instruction sheet.  Instruction reviewed in PAT, understanding verbalized.    Dr frankel notified for instruction on plavix and ASA.  Stated that pt should stop plavix today and continue 81 ASA.  Pt instructed accordingly, understanding verbalized.

## 2019-01-11 NOTE — DISCHARGE INSTRUCTIONS
[facility]  Pre-op Information and Guidelines    You will be called after 2 p.m. the day before your surgery (Friday for Monday surgery) and notified of your time for arrival and approximate surgery time.  If you have not received a call by 4P.M., please contact Same Day Surgery at [phone] of if outside [facility] call [phone].    Please Follow these Important Safety Guidelines:    • The morning of your procedure, take only the medications listed below with   A sip of water:_____________________________________________       ______________________________________________    • DO NOT eat or drink anything after 12:00 midnight the night before surgery  Specific instructions concerning drinking clear liquids will be discussed during  the pre-surgery instruction call the day before your surgery.    • If you take a blood thinner (ex. Plavix, Coumadin, aspirin), ask your doctor when to stop it before surgery  STOP DATE: _________________    • Only 2 visitors are allowed in patient rooms at a time  Your visitors will be asked to wait in the lobby until the admission process is complete with the exception of a parent with a child and patients in need of special assistance.    • YOU CANNOT DRIVE YOURSELF HOME  You must be accompanied by someone who will be responsible for driving you home after surgery and for your care at home.    • DO NOT chew gum, use breath mints, hard candy, or smoke the day of surgery  • DO NOT drink alcohol for at least 24 hours before your surgery  • DO NOT wear any jewelry and remove all body piercing before coming to the hospital  • DO NOT wear make-up to the hospital  • If you are having surgery on an extremity (arm/leg/foot) remove nail polish/artificial nails on the surgical side  • Clothing, glasses, contacts, dentures, and hairpieces must be removed before surgery  • Bathe the night before or the morning of your surgery and do not use powders/lotions on  skin.

## 2019-01-15 ENCOUNTER — TELEPHONE (OUTPATIENT)
Dept: PODIATRY | Facility: CLINIC | Age: 59
End: 2019-01-15

## 2019-01-15 ENCOUNTER — TELEPHONE (OUTPATIENT)
Dept: FAMILY MEDICINE CLINIC | Facility: CLINIC | Age: 59
End: 2019-01-15

## 2019-01-15 DIAGNOSIS — F33.1 DEPRESSION, MAJOR, RECURRENT, MODERATE (HCC): ICD-10-CM

## 2019-01-15 NOTE — TELEPHONE ENCOUNTER
JOHN    WANTS TO GET A SCRIPT FOR A WHEELCHAIR FOR AFTER HER SURGERY.    PLEASE CALL HER WHEN THE SCRIPT IS READY

## 2019-01-15 NOTE — TELEPHONE ENCOUNTER
[facility] CALLED FOR CHIQUITA BAILEY..SHE HAS NEW ACCU CHECK METER NOW BECAUSE OF INSURANCE AND IS NEEDING PRESP FOR THE TEST STRIPS..PLEASE PUT ON DIGN CODE AND TESTING DIRECTIONS

## 2019-01-15 NOTE — TELEPHONE ENCOUNTER
[facility] CALLED FOR CHIQUITA BAILEY..SHE HAS NEW ACCU CHECK METER NOW BECAUSE OF INSURANCE AND IS NEEDING PRESP FOR THE TEST STRIPS..PLEASE PUT ON DIGN CODE AND TESTING DIRECTIONS

## 2019-01-16 ENCOUNTER — ANESTHESIA EVENT (OUTPATIENT)
Dept: PERIOP | Facility: HOSPITAL | Age: 59
End: 2019-01-16

## 2019-01-16 ENCOUNTER — APPOINTMENT (OUTPATIENT)
Dept: GENERAL RADIOLOGY | Facility: HOSPITAL | Age: 59
End: 2019-01-16

## 2019-01-16 ENCOUNTER — ANESTHESIA (OUTPATIENT)
Dept: PERIOP | Facility: HOSPITAL | Age: 59
End: 2019-01-16

## 2019-01-16 ENCOUNTER — HOSPITAL ENCOUNTER (OUTPATIENT)
Facility: HOSPITAL | Age: 59
Setting detail: HOSPITAL OUTPATIENT SURGERY
Discharge: HOME | End: 2019-01-16
Attending: PODIATRIST | Admitting: PODIATRIST

## 2019-01-16 VITALS
SYSTOLIC BLOOD PRESSURE: 122 MMHG | HEIGHT: 68 IN | TEMPERATURE: 98.3 F | BODY MASS INDEX: 40.2 KG/M2 | OXYGEN SATURATION: 98 % | WEIGHT: 265.21 LBS | DIASTOLIC BLOOD PRESSURE: 60 MMHG | RESPIRATION RATE: 18 BRPM | HEART RATE: 88 BPM

## 2019-01-16 DIAGNOSIS — S92.352K DISPLACED FRACTURE OF FIFTH METATARSAL BONE, LEFT FOOT, SUBSEQUENT ENCOUNTER FOR FRACTURE WITH NONUNION: ICD-10-CM

## 2019-01-16 DIAGNOSIS — Q66.70 PES CAVUS: ICD-10-CM

## 2019-01-16 LAB
ANION GAP SERPL CALCULATED.3IONS-SCNC: 9 MMOL/L (ref 5–15)
BUN BLD-MCNC: 18 MG/DL (ref 7–21)
BUN/CREAT SERPL: 16.2 (ref 7–25)
CALCIUM SPEC-SCNC: 9.2 MG/DL (ref 8.4–10.2)
CHLORIDE SERPL-SCNC: 99 MMOL/L (ref 95–110)
CO2 SERPL-SCNC: 30 MMOL/L (ref 22–31)
CREAT BLD-MCNC: 1.11 MG/DL (ref 0.5–1)
GFR SERPL CREATININE-BSD FRML MDRD: 51 ML/MIN/1.73 (ref 51–120)
GLUCOSE BLD-MCNC: 124 MG/DL (ref 60–100)
GLUCOSE BLDC GLUCOMTR-MCNC: 121 MG/DL (ref 70–130)
GLUCOSE BLDC GLUCOMTR-MCNC: 232 MG/DL (ref 70–130)
POTASSIUM BLD-SCNC: 3.2 MMOL/L (ref 3.5–5.1)
SODIUM BLD-SCNC: 138 MMOL/L (ref 137–145)

## 2019-01-16 PROCEDURE — C1713 ANCHOR/SCREW BN/BN,TIS/BN: HCPCS | Performed by: PODIATRIST

## 2019-01-16 PROCEDURE — 28300 INCISION OF HEEL BONE: CPT | Performed by: PODIATRIST

## 2019-01-16 PROCEDURE — 93010 ELECTROCARDIOGRAM REPORT: CPT | Performed by: INTERNAL MEDICINE

## 2019-01-16 PROCEDURE — 25010000002 ONDANSETRON PER 1 MG: Performed by: NURSE ANESTHETIST, CERTIFIED REGISTERED

## 2019-01-16 PROCEDURE — 80048 BASIC METABOLIC PNL TOTAL CA: CPT | Performed by: ANESTHESIOLOGY

## 2019-01-16 PROCEDURE — 76000 FLUOROSCOPY <1 HR PHYS/QHP: CPT

## 2019-01-16 PROCEDURE — C1769 GUIDE WIRE: HCPCS | Performed by: PODIATRIST

## 2019-01-16 PROCEDURE — 25010000002 PROPOFOL 10 MG/ML EMULSION: Performed by: NURSE ANESTHETIST, CERTIFIED REGISTERED

## 2019-01-16 PROCEDURE — 25010000002 MIDAZOLAM PER 1 MG: Performed by: NURSE ANESTHETIST, CERTIFIED REGISTERED

## 2019-01-16 PROCEDURE — 82962 GLUCOSE BLOOD TEST: CPT

## 2019-01-16 PROCEDURE — 88300 SURGICAL PATH GROSS: CPT | Performed by: PODIATRIST

## 2019-01-16 PROCEDURE — 93005 ELECTROCARDIOGRAM TRACING: CPT | Performed by: ANESTHESIOLOGY

## 2019-01-16 PROCEDURE — 25010000002 ROPIVACAINE PER 1 MG: Performed by: ANESTHESIOLOGY

## 2019-01-16 PROCEDURE — 25010000002 FENTANYL CITRATE (PF) 100 MCG/2ML SOLUTION: Performed by: NURSE ANESTHETIST, CERTIFIED REGISTERED

## 2019-01-16 PROCEDURE — 88300 SURGICAL PATH GROSS: CPT | Performed by: PATHOLOGY

## 2019-01-16 PROCEDURE — 28322 REPAIR OF METATARSALS: CPT | Performed by: PODIATRIST

## 2019-01-16 DEVICE — IMPLANTABLE DEVICE: Type: IMPLANTABLE DEVICE | Site: FOOT | Status: FUNCTIONAL

## 2019-01-16 DEVICE — SCRW LK VA LP TI 2.4X14MM: Type: IMPLANTABLE DEVICE | Site: FOOT | Status: FUNCTIONAL

## 2019-01-16 DEVICE — SCRW LK VA LP TI 2.4X16MM: Type: IMPLANTABLE DEVICE | Site: FOOT | Status: FUNCTIONAL

## 2019-01-16 RX ORDER — PROPOFOL 10 MG/ML
VIAL (ML) INTRAVENOUS AS NEEDED
Status: DISCONTINUED | OUTPATIENT
Start: 2019-01-16 | End: 2019-01-16 | Stop reason: SURG

## 2019-01-16 RX ORDER — OXYCODONE AND ACETAMINOPHEN 7.5; 325 MG/1; MG/1
TABLET ORAL
Qty: 40 TABLET | Refills: 0 | Status: SHIPPED | OUTPATIENT
Start: 2019-01-16 | End: 2019-01-29

## 2019-01-16 RX ORDER — FENTANYL CITRATE 50 UG/ML
INJECTION, SOLUTION INTRAMUSCULAR; INTRAVENOUS AS NEEDED
Status: DISCONTINUED | OUTPATIENT
Start: 2019-01-16 | End: 2019-01-16 | Stop reason: SURG

## 2019-01-16 RX ORDER — MIDAZOLAM HYDROCHLORIDE 1 MG/ML
INJECTION INTRAMUSCULAR; INTRAVENOUS AS NEEDED
Status: DISCONTINUED | OUTPATIENT
Start: 2019-01-16 | End: 2019-01-16

## 2019-01-16 RX ORDER — LABETALOL HYDROCHLORIDE 5 MG/ML
5 INJECTION, SOLUTION INTRAVENOUS
Status: DISCONTINUED | OUTPATIENT
Start: 2019-01-16 | End: 2019-01-16 | Stop reason: HOSPADM

## 2019-01-16 RX ORDER — ROPIVACAINE HYDROCHLORIDE 5 MG/ML
INJECTION, SOLUTION EPIDURAL; INFILTRATION; PERINEURAL
Status: COMPLETED | OUTPATIENT
Start: 2019-01-16 | End: 2019-01-16

## 2019-01-16 RX ORDER — BUPIVACAINE HCL/0.9 % NACL/PF 0.1 %
2 PLASTIC BAG, INJECTION (ML) EPIDURAL ONCE
Status: COMPLETED | OUTPATIENT
Start: 2019-01-16 | End: 2019-01-16

## 2019-01-16 RX ORDER — ONDANSETRON 2 MG/ML
INJECTION INTRAMUSCULAR; INTRAVENOUS AS NEEDED
Status: DISCONTINUED | OUTPATIENT
Start: 2019-01-16 | End: 2019-01-16 | Stop reason: SURG

## 2019-01-16 RX ORDER — DIPHENHYDRAMINE HYDROCHLORIDE 50 MG/ML
12.5 INJECTION INTRAMUSCULAR; INTRAVENOUS
Status: DISCONTINUED | OUTPATIENT
Start: 2019-01-16 | End: 2019-01-16 | Stop reason: HOSPADM

## 2019-01-16 RX ORDER — NALOXONE HCL 0.4 MG/ML
0.2 VIAL (ML) INJECTION AS NEEDED
Status: DISCONTINUED | OUTPATIENT
Start: 2019-01-16 | End: 2019-01-16 | Stop reason: HOSPADM

## 2019-01-16 RX ORDER — EPHEDRINE SULFATE 50 MG/ML
5 INJECTION, SOLUTION INTRAVENOUS ONCE AS NEEDED
Status: DISCONTINUED | OUTPATIENT
Start: 2019-01-16 | End: 2019-01-16 | Stop reason: HOSPADM

## 2019-01-16 RX ORDER — ONDANSETRON 2 MG/ML
4 INJECTION INTRAMUSCULAR; INTRAVENOUS ONCE AS NEEDED
Status: DISCONTINUED | OUTPATIENT
Start: 2019-01-16 | End: 2019-01-16 | Stop reason: HOSPADM

## 2019-01-16 RX ORDER — ACETAMINOPHEN 650 MG/1
650 SUPPOSITORY RECTAL ONCE AS NEEDED
Status: DISCONTINUED | OUTPATIENT
Start: 2019-01-16 | End: 2019-01-16 | Stop reason: HOSPADM

## 2019-01-16 RX ORDER — SODIUM CHLORIDE, SODIUM GLUCONATE, SODIUM ACETATE, POTASSIUM CHLORIDE, AND MAGNESIUM CHLORIDE 526; 502; 368; 37; 30 MG/100ML; MG/100ML; MG/100ML; MG/100ML; MG/100ML
INJECTION, SOLUTION INTRAVENOUS CONTINUOUS PRN
Status: DISCONTINUED | OUTPATIENT
Start: 2019-01-16 | End: 2019-01-16 | Stop reason: SURG

## 2019-01-16 RX ORDER — ACETAMINOPHEN 325 MG/1
650 TABLET ORAL ONCE AS NEEDED
Status: DISCONTINUED | OUTPATIENT
Start: 2019-01-16 | End: 2019-01-16 | Stop reason: HOSPADM

## 2019-01-16 RX ORDER — FLUMAZENIL 0.1 MG/ML
0.2 INJECTION INTRAVENOUS AS NEEDED
Status: DISCONTINUED | OUTPATIENT
Start: 2019-01-16 | End: 2019-01-16 | Stop reason: HOSPADM

## 2019-01-16 RX ORDER — SODIUM CHLORIDE 9 MG/ML
1000 INJECTION, SOLUTION INTRAVENOUS CONTINUOUS
Status: DISCONTINUED | OUTPATIENT
Start: 2019-01-16 | End: 2019-01-16 | Stop reason: HOSPADM

## 2019-01-16 RX ORDER — MIDAZOLAM HYDROCHLORIDE 1 MG/ML
INJECTION INTRAMUSCULAR; INTRAVENOUS AS NEEDED
Status: DISCONTINUED | OUTPATIENT
Start: 2019-01-16 | End: 2019-01-16 | Stop reason: SURG

## 2019-01-16 RX ORDER — MAGNESIUM HYDROXIDE 1200 MG/15ML
LIQUID ORAL AS NEEDED
Status: DISCONTINUED | OUTPATIENT
Start: 2019-01-16 | End: 2019-01-16 | Stop reason: HOSPADM

## 2019-01-16 RX ADMIN — SODIUM CHLORIDE: 900 INJECTION, SOLUTION INTRAVENOUS at 07:18

## 2019-01-16 RX ADMIN — PROPOFOL 150 MG: 10 INJECTION, EMULSION INTRAVENOUS at 07:33

## 2019-01-16 RX ADMIN — FENTANYL CITRATE 50 MCG: 50 INJECTION, SOLUTION INTRAMUSCULAR; INTRAVENOUS at 07:33

## 2019-01-16 RX ADMIN — Medication 2 G: at 07:40

## 2019-01-16 RX ADMIN — ROPIVACAINE HYDROCHLORIDE 30 ML: 5 INJECTION, SOLUTION EPIDURAL; INFILTRATION; PERINEURAL at 07:33

## 2019-01-16 RX ADMIN — ONDANSETRON 8 MG: 2 INJECTION INTRAMUSCULAR; INTRAVENOUS at 09:39

## 2019-01-16 RX ADMIN — FENTANYL CITRATE 50 MCG: 50 INJECTION, SOLUTION INTRAMUSCULAR; INTRAVENOUS at 09:38

## 2019-01-16 RX ADMIN — MIDAZOLAM HYDROCHLORIDE 2 MG: 2 INJECTION, SOLUTION INTRAMUSCULAR; INTRAVENOUS at 07:20

## 2019-01-16 RX ADMIN — SODIUM CHLORIDE, SODIUM GLUCONATE, SODIUM ACETATE, POTASSIUM CHLORIDE, AND MAGNESIUM CHLORIDE: 526; 502; 368; 37; 30 INJECTION, SOLUTION INTRAVENOUS at 08:27

## 2019-01-16 RX ADMIN — SODIUM CHLORIDE 1000 ML: 900 INJECTION, SOLUTION INTRAVENOUS at 06:01

## 2019-01-16 NOTE — ANESTHESIA PREPROCEDURE EVALUATION
Anesthesia Evaluation     no history of anesthetic complications:  NPO Solid Status: > 8 hours  NPO Liquid Status: > 2 hours           Airway   Mallampati: II  TM distance: >3 FB  Neck ROM: full  No difficulty expected  Dental    (+) upper dentures and lower dentures    Pulmonary     breath sounds clear to auscultation  (+) decreased breath sounds,   (-) COPD, asthma, sleep apnea, not a smoker    ROS comment: snores  Cardiovascular - normal exam  Exercise tolerance: good (4-7 METS)    ECG reviewed  PT is on anticoagulation therapy  Patient on routine beta blocker and Beta blocker given within 24 hours of surgery  Rhythm: regular  Rate: normal    (+) hypertension poorly controlled 2 medications or greater, CAD, CABG >6 Months, cardiac stents more than 12 months ago hyperlipidemia,   (-) valvular problems/murmurs, dysrhythmias, angina, CHF, PVD, DVT    ROS comment: Normal sinus rhythm  Normal ECG    · Left Ventricle: Estimated EF appears to be in the range of 51 - 55%. Normal left ventricular cavity size noted  · There is moderate eccentric hypertrophy of the left ventricular cavity. Left ventricular diastolic dysfunction is noted (grade II w/high LAP) consistent with pseudonormalization.  · Right Ventricle: Normal right ventricular wall thickness, systolic function and septal motion noted. Right ventricular cavity is borderline dilated  · No evidence of a patent foramen ovale. No evidence of an atrial septal defect present. Saline test results are negative.  · The aortic valve is abnormal with mild calcification of the right coronary cusp.  · Mitral Valve: The mitral valve is abnormal in structure. There is anterior leaflet thickening present. Mild mitral valve regurgitation is present.  · Trace to mild tricuspid valve regurgitation is present. Estimated right ventricular systolic pressure from tricuspid regurgitation is moderately elevated (45-55 mmHg)  · Mild pulmonary hypertension is present.  · There is no  evidence of pericardial effusion.       Neuro/Psych  (+) numbness (both feet), psychiatric history Anxiety and Depression,     (-) seizures, TIA, CVA, headaches  GI/Hepatic/Renal/Endo    (+) morbid obesity, GERD well controlled,  diabetes mellitus (glu 121) type 2 well controlled using insulin,   (-) hepatitis, liver disease, no renal disease    Musculoskeletal         ROS comment: Left foot  Abdominal   (+) obese,    Substance History - negative use     OB/GYN          Other - negative ROS       (-) history of cancer                  Anesthesia Plan    ASA 3     general with block   (Popliteal block discussed and patient agrees to proceed)  intravenous induction   Anesthetic plan, all risks, benefits, and alternatives have been provided, discussed and informed consent has been obtained with: patient and spouse/significant other.

## 2019-01-16 NOTE — ANESTHESIA PROCEDURE NOTES
Airway  Urgency: elective      General Information and Staff    Patient location during procedure: OR  CRNA: Tiesha Mayberry CRNA    Indications and Patient Condition  Indications for airway management: airway protection    Preoxygenated: yes      Final Airway Details  Final airway type: supraglottic airway      Successful airway: classic  Size 4    Number of attempts at approach: 1

## 2019-01-16 NOTE — ANESTHESIA POSTPROCEDURE EVALUATION
Patient: Ariana Martinez    Procedure Summary     Date:  01/16/19 Room / Location:  [facility] OR 09 / [facility] OR    Anesthesia Start:  0722 Anesthesia Stop:  1008    Procedure:  LEFT FOOT HARDWARE REMOVAL, FIFTH METATARSAL , OPEN REDUCTION INTERNAL FIXATION, CALCANEAL OSTEOTOMY (Left Foot) Diagnosis:       Displaced fracture of fifth metatarsal bone, left foot, subsequent encounter for fracture with nonunion      Pes cavus      (Displaced fracture of fifth metatarsal bone, left foot, subsequent encounter for fracture with nonunion [S92.352K])      (Pes cavus [Q66.7])    Surgeon:  Ignacio Lord DPM Provider:  Ignacio Redmond MD    Anesthesia Type:  general with block ASA Status:  3          Anesthesia Type: general with block  Last vitals  BP   150/72 (01/16/19 0550)   Temp   97.5 °F (36.4 °C) (01/16/19 0550)   Pulse   90 (01/16/19 0550)   Resp   16 (01/16/19 0550)     SpO2   96 % (01/16/19 0550)     Post Anesthesia Care and Evaluation    Patient location during evaluation: PACU  Patient participation: complete - patient participated  Level of consciousness: sleepy but conscious  Pain management: adequate  Airway patency: patent  Anesthetic complications: No anesthetic complications    Cardiovascular status: acceptable  Respiratory status: acceptable  Hydration status: acceptable

## 2019-01-16 NOTE — ANESTHESIA PROCEDURE NOTES
Peripheral Block    Pre-sedation assessment completed: 1/16/2019 7:25 AM    Patient location during procedure: OR  Start time: 1/16/2019 7:26 AM  Stop time: 1/16/2019 7:31 AM  Reason for block: at surgeon's request and post-op pain management  Performed by  Anesthesiologist: Ignacio Redmond MD  Assisted by: Tiesha Mayberry CRNA  Preanesthetic Checklist  Completed: patient identified, site marked, surgical consent, pre-op evaluation, timeout performed, IV checked, risks and benefits discussed and monitors and equipment checked  Prep:  Pt Position: right lateral decubitus  Sterile barriers:cap, gloves and mask  Prep: ChloraPrep  Patient monitoring: blood pressure monitoring, continuous pulse oximetry and EKG  Procedure  Performed under: MAC  Guidance:ultrasound guided  ULTRASOUND INTERPRETATION.  Using ultrasound guidance a 21 G gauge needle was placed in close proximity to the sciatic nerve, at which point, under ultrasound guidance anesthetic was injected in the area of the nerve and spread of the anesthesia was seen on ultrasound in close proximity thereto.  There were no abnormalities seen on ultrasound; a digital image was taken; and the patient tolerated the procedure with no complications. Images:still images obtained    Laterality:left  Block Type:popliteal  Injection Technique:single-shot  Needle Type:echogenic  Needle Gauge:21 G    Medications Used: ropivacaine (NAROPIN) 0.5 % injection, 30 mL  Medications  Comment:Pt ID'd  Ultrasound guided  Needle seen throughout  Local infiltration appropriate    Post Assessment  Injection Assessment: negative aspiration for heme, no paresthesia on injection and incremental injection  Patient Tolerance:comfortable throughout block  Complications:no

## 2019-01-17 LAB
LAB AP CASE REPORT: NORMAL
PATH REPORT.FINAL DX SPEC: NORMAL
PATH REPORT.GROSS SPEC: NORMAL

## 2019-01-17 RX ORDER — ARIPIPRAZOLE 15 MG/1
TABLET ORAL
Qty: 30 TABLET | Refills: 0 | Status: SHIPPED | OUTPATIENT
Start: 2019-01-17 | End: 2019-02-15 | Stop reason: SDUPTHER

## 2019-01-22 ENCOUNTER — OFFICE VISIT (OUTPATIENT)
Dept: PODIATRY | Facility: CLINIC | Age: 59
End: 2019-01-22

## 2019-01-22 VITALS — OXYGEN SATURATION: 98 % | WEIGHT: 265 LBS | HEIGHT: 68 IN | BODY MASS INDEX: 40.16 KG/M2 | HEART RATE: 84 BPM

## 2019-01-22 DIAGNOSIS — S92.352K DISPLACED FRACTURE OF FIFTH METATARSAL BONE, LEFT FOOT, SUBSEQUENT ENCOUNTER FOR FRACTURE WITH NONUNION: Primary | ICD-10-CM

## 2019-01-22 DIAGNOSIS — M79.672 LEFT FOOT PAIN: ICD-10-CM

## 2019-01-22 DIAGNOSIS — Q66.70 PES CAVUS: ICD-10-CM

## 2019-01-22 PROCEDURE — 99024 POSTOP FOLLOW-UP VISIT: CPT | Performed by: PODIATRIST

## 2019-01-22 PROCEDURE — 29405 APPL SHORT LEG CAST: CPT | Performed by: PODIATRIST

## 2019-01-22 NOTE — PROGRESS NOTES
Ariana Martinez  1960  59 y.o. female  PCP: Dr. Fong last seen 12/12/18  BS: 124 per patient     Patient presents today for post op follow up. She states she fell Sunday and has been in more pain since. She gave a pain scale of 8/10     Chief Complaint   Patient presents with   • Left Foot - Post-op Follow-up           History of Present Illness    Ms Martinez is a 60 y/o diabetic female who presents for f/u left foot revision fifth metatarsal fracture nonunion and Rich calcaneal osteotomy.  Date of surgery 1/16/2019.  Patient was doing well overall however states that she fell and twisted her foot and leg underneath her this past Sunday in her home.  She denies any other injuries.  She states the splint did remain intact.  She has had a moderate increase in pain since that fall.    Past Medical History:   Diagnosis Date   • Angina, class IV (CMS/HCC)    • Anxiety    • Benign paroxysmal positional vertigo    • Carpal tunnel syndrome    • Chronic pain    • Coronary atherosclerosis     hx CABG 2005.  is followed by Dr Kwon   • Depression    • Diabetes mellitus (CMS/HCC)     Type 2, controlled   • Diabetic polyneuropathy (CMS/HCC)    • Elevated cholesterol    • Female stress incontinence    • Gastroparesis    • GERD (gastroesophageal reflux disease)    • Hyperlipidemia    • Hypertension    • Low back pain    • Malaise and fatigue    • Multiple joint pain    • Obesity     Refuses to be weighed   • Otalgia     Both   • Perforation of tympanic membrane     Left   • Postoperative wound infection    • Vitamin D deficiency          Past Surgical History:   Procedure Laterality Date   • ABDOMINAL SURGERY     • ANGIOPLASTY      coronary   • BREAST BIOPSY Right    • CARDIAC CATHETERIZATION     • CARDIAC CATHETERIZATION N/A 6/20/2017    Procedure: Right Heart Cath;  Surgeon: Can Kwon MD PhD;  Location: [facility] INVASIVE LOCATION;  Service:    • CARPAL TUNNEL RELEASE     • CHOLECYSTECTOMY     •  CORONARY ARTERY BYPASS GRAFT  2005   • ENDOSCOPY N/A 10/19/2018    Procedure: ESOPHAGOGASTRODUODENOSCOPY possible dilation;  Surgeon: Julián Maldonado MD;  Location: [facility] ENDOSCOPY;  Service: Gastroenterology   • ENDOSCOPY AND COLONOSCOPY     • FOOT SURGERY      Toes   • GASTRIC BANDING      Revision, laparoscopic   • HYSTERECTOMY     • MOUTH SURGERY     • SALPINGO OOPHORECTOMY     • SHOULDER SURGERY     • TRANSESOPHAGEAL ECHOCARDIOGRAM (LAMONTE)      With color flow         Family History   Problem Relation Age of Onset   • Diabetes Other    • Heart disease Other    • Hypertension Other    • Heart disease Mother    • Stroke Mother    • Hypertension Mother    • Diabetes Sister    • Heart disease Sister    • Hypertension Sister    • Heart disease Sister    • Diabetes Sister    • Hypertension Sister    • Diabetes Sister    • Diabetes Sister    • Diabetes Sister    • Diabetes Sister          Social History     Socioeconomic History   • Marital status:      Spouse name: Not on file   • Number of children: Not on file   • Years of education: Not on file   • Highest education level: Not on file   Social Needs   • Financial resource strain: Not on file   • Food insecurity - worry: Not on file   • Food insecurity - inability: Not on file   • Transportation needs - medical: Not on file   • Transportation needs - non-medical: Not on file   Occupational History   • Not on file   Tobacco Use   • Smoking status: Never Smoker   • Smokeless tobacco: Never Used   Substance and Sexual Activity   • Alcohol use: No   • Drug use: No   • Sexual activity: Defer   Other Topics Concern   • Not on file   Social History Narrative   • Not on file         Current Outpatient Medications   Medication Sig Dispense Refill   • ARIPiprazole (ABILIFY) 15 MG tablet Take 15 mg by mouth Daily.     • ARIPiprazole (ABILIFY) 15 MG tablet TAKE 1 TABLET BY MOUTH DAILY. 30 tablet 0   • aspirin 81 MG chewable tablet Chew 81 mg daily.     • atorvastatin  (LIPITOR) 40 MG tablet TAKE 1 TABLET BY MOUTH EVERY NIGHT. 90 tablet 1   • BD SHARPS CONTAINER HOME misc 1 each Take As Directed. 1 each 0   • Blood Glucose Monitoring Suppl (ONE TOUCH ULTRA MINI) w/Device kit USE AS DIRECTED TO CHECK BLOOD SUGAR 1 each 0   • Blood Glucose Monitoring Suppl (ONE TOUCH ULTRA MINI) w/Device kit USE 4 TIMES A DAY 1 each 0   • Calcium Citrate-Vitamin D (CITRACAL/VITAMIN D) 250-200 MG-UNIT tablet Take 2 tablets by mouth 2 (two) times a day.     • clopidogrel (PLAVIX) 75 MG tablet Take 75 mg by mouth Daily.     • cyanocobalamin 1000 MCG/ML injection Inject 1 mL into the appropriate muscle as directed by prescriber Every 28 (Twenty-Eight) Days. 1 mL 0   • furosemide (LASIX) 40 MG tablet Take 1 tablet by mouth Daily. 90 tablet 3   • gabapentin (NEURONTIN) 400 MG capsule Take 400 mg by mouth 3 (Three) Times a Day.     • GLUCAGON EMERGENCY 1 MG injection USE AS DIRECTED AS NEEDED 1 kit 0   • glucose blood (ONE TOUCH ULTRA TEST) test strip 1 each by Other route 4 (Four) Times a Day. Use as instructed 400 each 1   • hydrochlorothiazide (MICROZIDE) 12.5 MG capsule Take 12.5 mg by mouth Daily.     • insulin aspart (novoLOG FLEXPEN) 100 UNIT/ML solution pen-injector sc pen Inject 60 Units under the skin into the appropriate area as directed 3 (Three) Times a Day With Meals.     • Insulin Glargine (BASAGLAR KWIKPEN) 100 UNIT/ML injection pen Inject 100 Units under the skin into the appropriate area as directed Every Night.     • Insulin Pen Needle (B-D ULTRAFINE III SHORT PEN) 31G X 8 MM misc Inject 1 each into the shoulder, thigh, or buttocks 4 (Four) Times a Day. use as directed 150 each 11   • LORazepam (ATIVAN) 0.5 MG tablet Take 1 tablet by mouth Daily As Needed for Anxiety. 15 tablet 2   • meclizine (ANTIVERT) 25 MG tablet Take 1 tablet by mouth 3 (Three) Times a Day As Needed for dizziness. 90 tablet 6   • meloxicam (MOBIC) 15 MG tablet Take 1 tablet by mouth Daily. Take once daily. 30 tablet  "0   • metoclopramide (REGLAN) 10 MG tablet Take 10 mg by mouth 4 (Four) Times a Day As Needed.     • metoprolol succinate XL (TOPROL-XL) 25 MG 24 hr tablet TAKE 1 TABLET BY MOUTH DAILY. 31 tablet 6   • ONE TOUCH ULTRA TEST test strip USE TO TEST SUGAR 4 TIMES A  each 3   • oxyCODONE-acetaminophen (PERCOCET) 7.5-325 MG per tablet Take 1 to 2 tablets by mouth every 4 to 6 hours as needed for pain. 40 tablet 0   • pantoprazole (PROTONIX) 40 MG EC tablet Take 40 mg by mouth Daily.     • SITagliptin (JANUVIA) 100 MG tablet Take 100 mg by mouth Daily.     • Syringe/Needle, Disp, (LUER LOCK SAFETY SYRINGES) 25G X 5/8\" 3 ML misc 1 each Daily. 100 each 0   • venlafaxine XR (EFFEXOR-XR) 150 MG 24 hr capsule Take 150 mg by mouth 2 (Two) Times a Day.     • vitamin D (ERGOCALCIFEROL) 45814 units capsule capsule Take 50,000 Units by mouth 1 (One) Time Per Week. sunday     • mirtazapine (REMERON) 45 MG tablet TAKE 1 TABLET BY MOUTH EVERY NIGHT. 90 tablet 1     No current facility-administered medications for this visit.          OBJECTIVE    Pulse 84   Ht 172.7 cm (67.99\")   Wt 120 kg (265 lb)   SpO2 98%   BMI 40.30 kg/m²       Review of Systems   Constitutional:  Denies recent weight loss, weight gain, fever or chills, no change in exercise tolerance  Musculoskeletal: Foot pain. Hammertoe deformity.   Skin:  Dry skin.  Neurological:  Burning sensations to feet b/l.  Psychiatric/Behavioral: Denies depression    Physical Exam   Constitutional: she appears well-developed and well-nourished.   Psychiatric: she has a normal mood and affect. her   behavior is normal.      Lower Extremity Exam:  Vascular: DP pulses palpable 2+. PT not readily palpable. +signal on doppler  Negative hair growth.   Mild to moderate left foot edema  Negative Conrad  Mouth ecchymosis to lateral midfoot  Neuro: Protective sensation absent to foot, b/l. Reestablished at mid calf   DTRs intact  Vibratory sensation diminished.  Integument: Left foot " incisions coapted with sutures in place.  No signs of infection  Diffuse xerosis b/l.  Webspaces c/d/i  Musculoskeletal: Left ankle range of motion intact        Procedures        ASSESSMENT AND PLAN    Ariana was seen today for post-op follow-up.    Diagnoses and all orders for this visit:    Displaced fracture of fifth metatarsal bone, left foot, subsequent encounter for fracture with nonunion    Left foot pain  -     XR Foot 3+ View Left    Pes cavus      -Radiographs ordered and reviewed.  Fifth metatarsal hardware is intact without any signs of failure or loosening.  Her calcaneal osteotomy appears to possibly translated slightly dorsally without any obvious signs of complete hardware failure.  -Well padded below-knee fiberglass cast applied today  -Any strict nonweightbearing with the assistance of walker and wheelchair  -Follow up 1 week, suture removal          This document has been electronically signed by Ignacio Lord DPM on January 24, 2019 11:43 AM     Ignacio Lord DPM  1/24/2019  11:43 AM

## 2019-01-23 RX ORDER — MIRTAZAPINE 45 MG/1
45 TABLET, FILM COATED ORAL NIGHTLY
Qty: 90 TABLET | Refills: 1 | Status: SHIPPED | OUTPATIENT
Start: 2019-01-23 | End: 2019-07-17 | Stop reason: SDUPTHER

## 2019-01-28 ENCOUNTER — TELEPHONE (OUTPATIENT)
Dept: PODIATRY | Facility: CLINIC | Age: 59
End: 2019-01-28

## 2019-01-28 NOTE — TELEPHONE ENCOUNTER
Patient was made aware there was no openings today and was advised to keep the foot elevated and iced and keep her pain medication on board.

## 2019-01-28 NOTE — TELEPHONE ENCOUNTER
JOHN      PATIENT HAS AN APPT TOMORROW BUT CALLED AND WANTS TO BE SEEN TODAY.  SAYS SHE IS IN A LOT OF PAIN.    IS THERE SOMETHING SHE CAN DO UNTIL TOMORROW OR DO YOU WANT TO SEE HER TODAY?    PLEASE CALL PATIENT.

## 2019-01-29 ENCOUNTER — OFFICE VISIT (OUTPATIENT)
Dept: PODIATRY | Facility: CLINIC | Age: 59
End: 2019-01-29

## 2019-01-29 VITALS — BODY MASS INDEX: 40.16 KG/M2 | HEIGHT: 68 IN | WEIGHT: 265 LBS

## 2019-01-29 DIAGNOSIS — M79.672 LEFT FOOT PAIN: Primary | ICD-10-CM

## 2019-01-29 DIAGNOSIS — Q66.70 PES CAVUS: ICD-10-CM

## 2019-01-29 DIAGNOSIS — S92.352K DISPLACED FRACTURE OF FIFTH METATARSAL BONE, LEFT FOOT, SUBSEQUENT ENCOUNTER FOR FRACTURE WITH NONUNION: ICD-10-CM

## 2019-01-29 DIAGNOSIS — E11.42 DIABETIC POLYNEUROPATHY ASSOCIATED WITH TYPE 2 DIABETES MELLITUS (HCC): ICD-10-CM

## 2019-01-29 PROCEDURE — 99024 POSTOP FOLLOW-UP VISIT: CPT | Performed by: PODIATRIST

## 2019-01-29 PROCEDURE — 29405 APPL SHORT LEG CAST: CPT | Performed by: PODIATRIST

## 2019-01-29 RX ORDER — HYDROCODONE BITARTRATE AND ACETAMINOPHEN 10; 325 MG/1; MG/1
1 TABLET ORAL EVERY 4 HOURS PRN
Qty: 60 TABLET | Refills: 0 | Status: SHIPPED | OUTPATIENT
Start: 2019-01-29 | End: 2019-02-28 | Stop reason: DRUGHIGH

## 2019-01-29 NOTE — PROGRESS NOTES
Ariana Martinez  1960  59 y.o. female  PCP: Dr. Fong last seen 12/12/18  BS: 72 per patient     Patient presents today for post op follow up. She gave a pain scale of 8/10     Chief Complaint   Patient presents with   • Left Foot - Post-op Follow-up           History of Present Illness    Ms Martinez is a 60 y/o diabetic female who presents for f/u left foot revision fifth metatarsal fracture nonunion and Rich calcaneal osteotomy.  Date of surgery 1/16/2019.  She did fall on postoperative day 5, but denies any additional fall since last visit.  She states that she was unable to tolerate Percocet and has been experiencing increasing pain in her heel area.  She has been nonweightbearing since last visit.  She has no new complaints.    Past Medical History:   Diagnosis Date   • Angina, class IV (CMS/HCC)    • Anxiety    • Benign paroxysmal positional vertigo    • Carpal tunnel syndrome    • Chronic pain    • Coronary atherosclerosis     hx CABG 2005.  is followed by Dr Kwon   • Depression    • Diabetes mellitus (CMS/HCC)     Type 2, controlled   • Diabetic polyneuropathy (CMS/HCC)    • Elevated cholesterol    • Female stress incontinence    • Gastroparesis    • GERD (gastroesophageal reflux disease)    • Hyperlipidemia    • Hypertension    • Low back pain    • Malaise and fatigue    • Multiple joint pain    • Obesity     Refuses to be weighed   • Otalgia     Both   • Perforation of tympanic membrane     Left   • Postoperative wound infection    • Vitamin D deficiency          Past Surgical History:   Procedure Laterality Date   • ABDOMINAL SURGERY     • ANGIOPLASTY      coronary   • BREAST BIOPSY Right    • CARDIAC CATHETERIZATION     • CARDIAC CATHETERIZATION N/A 6/20/2017    Procedure: Right Heart Cath;  Surgeon: Can Kwon MD PhD;  Location: [facility] INVASIVE LOCATION;  Service:    • CARPAL TUNNEL RELEASE     • CHOLECYSTECTOMY     • CORONARY ARTERY BYPASS GRAFT  2005   • ENDOSCOPY N/A  10/19/2018    Procedure: ESOPHAGOGASTRODUODENOSCOPY possible dilation;  Surgeon: Julián Maldonado MD;  Location: [facility] ENDOSCOPY;  Service: Gastroenterology   • ENDOSCOPY AND COLONOSCOPY     • FOOT SURGERY      Toes   • GASTRIC BANDING      Revision, laparoscopic   • HYSTERECTOMY     • MOUTH SURGERY     • SALPINGO OOPHORECTOMY     • SHOULDER SURGERY     • SUBTALAR ARTHRODESIS Left 1/16/2019    Procedure: LEFT FOOT HARDWARE REMOVAL, FIFTH METATARSAL , OPEN REDUCTION INTERNAL FIXATION, CALCANEAL OSTEOTOMY;  Surgeon: Ignacio Lord DPM;  Location: [facility] OR;  Service: Podiatry   • TRANSESOPHAGEAL ECHOCARDIOGRAM (LAMONTE)      With color flow         Family History   Problem Relation Age of Onset   • Diabetes Other    • Heart disease Other    • Hypertension Other    • Heart disease Mother    • Stroke Mother    • Hypertension Mother    • Diabetes Sister    • Heart disease Sister    • Hypertension Sister    • Heart disease Sister    • Diabetes Sister    • Hypertension Sister    • Diabetes Sister    • Diabetes Sister    • Diabetes Sister    • Diabetes Sister          Social History     Socioeconomic History   • Marital status:      Spouse name: Not on file   • Number of children: Not on file   • Years of education: Not on file   • Highest education level: Not on file   Social Needs   • Financial resource strain: Not on file   • Food insecurity - worry: Not on file   • Food insecurity - inability: Not on file   • Transportation needs - medical: Not on file   • Transportation needs - non-medical: Not on file   Occupational History   • Not on file   Tobacco Use   • Smoking status: Never Smoker   • Smokeless tobacco: Never Used   Substance and Sexual Activity   • Alcohol use: No   • Drug use: No   • Sexual activity: Defer   Other Topics Concern   • Not on file   Social History Narrative   • Not on file         Current Outpatient Medications   Medication Sig Dispense Refill   • ARIPiprazole (ABILIFY) 15 MG tablet Take  15 mg by mouth Daily.     • ARIPiprazole (ABILIFY) 15 MG tablet TAKE 1 TABLET BY MOUTH DAILY. 30 tablet 0   • aspirin 81 MG chewable tablet Chew 81 mg daily.     • atorvastatin (LIPITOR) 40 MG tablet TAKE 1 TABLET BY MOUTH EVERY NIGHT. 90 tablet 1   • BD SHARPS CONTAINER HOME misc 1 each Take As Directed. 1 each 0   • Blood Glucose Monitoring Suppl (ONE TOUCH ULTRA MINI) w/Device kit USE AS DIRECTED TO CHECK BLOOD SUGAR 1 each 0   • Blood Glucose Monitoring Suppl (ONE TOUCH ULTRA MINI) w/Device kit USE 4 TIMES A DAY 1 each 0   • Calcium Citrate-Vitamin D (CITRACAL/VITAMIN D) 250-200 MG-UNIT tablet Take 2 tablets by mouth 2 (two) times a day.     • clopidogrel (PLAVIX) 75 MG tablet Take 75 mg by mouth Daily.     • cyanocobalamin 1000 MCG/ML injection Inject 1 mL into the appropriate muscle as directed by prescriber Every 28 (Twenty-Eight) Days. 1 mL 0   • furosemide (LASIX) 40 MG tablet Take 1 tablet by mouth Daily. 90 tablet 3   • gabapentin (NEURONTIN) 400 MG capsule Take 400 mg by mouth 3 (Three) Times a Day.     • GLUCAGON EMERGENCY 1 MG injection USE AS DIRECTED AS NEEDED 1 kit 0   • glucose blood (ONE TOUCH ULTRA TEST) test strip 1 each by Other route 4 (Four) Times a Day. Use as instructed 400 each 1   • hydrochlorothiazide (MICROZIDE) 12.5 MG capsule Take 12.5 mg by mouth Daily.     • insulin aspart (novoLOG FLEXPEN) 100 UNIT/ML solution pen-injector sc pen Inject 60 Units under the skin into the appropriate area as directed 3 (Three) Times a Day With Meals.     • Insulin Glargine (BASAGLAR KWIKPEN) 100 UNIT/ML injection pen Inject 100 Units under the skin into the appropriate area as directed Every Night.     • Insulin Pen Needle (B-D ULTRAFINE III SHORT PEN) 31G X 8 MM misc Inject 1 each into the shoulder, thigh, or buttocks 4 (Four) Times a Day. use as directed 150 each 11   • LORazepam (ATIVAN) 0.5 MG tablet Take 1 tablet by mouth Daily As Needed for Anxiety. 15 tablet 2   • meclizine (ANTIVERT) 25 MG  "tablet Take 1 tablet by mouth 3 (Three) Times a Day As Needed for dizziness. 90 tablet 6   • meloxicam (MOBIC) 15 MG tablet Take 1 tablet by mouth Daily. Take once daily. 30 tablet 0   • metoclopramide (REGLAN) 10 MG tablet Take 10 mg by mouth 4 (Four) Times a Day As Needed.     • metoprolol succinate XL (TOPROL-XL) 25 MG 24 hr tablet TAKE 1 TABLET BY MOUTH DAILY. 31 tablet 6   • mirtazapine (REMERON) 45 MG tablet TAKE 1 TABLET BY MOUTH EVERY NIGHT. 90 tablet 1   • ONE TOUCH ULTRA TEST test strip USE TO TEST SUGAR 4 TIMES A  each 3   • oxyCODONE-acetaminophen (PERCOCET) 7.5-325 MG per tablet Take 1 to 2 tablets by mouth every 4 to 6 hours as needed for pain. 40 tablet 0   • pantoprazole (PROTONIX) 40 MG EC tablet Take 40 mg by mouth Daily.     • SITagliptin (JANUVIA) 100 MG tablet Take 100 mg by mouth Daily.     • Syringe/Needle, Disp, (LUER LOCK SAFETY SYRINGES) 25G X 5/8\" 3 ML misc 1 each Daily. 100 each 0   • venlafaxine XR (EFFEXOR-XR) 150 MG 24 hr capsule Take 150 mg by mouth 2 (Two) Times a Day.     • vitamin D (ERGOCALCIFEROL) 41530 units capsule capsule Take 50,000 Units by mouth 1 (One) Time Per Week. sunday       No current facility-administered medications for this visit.          OBJECTIVE    Ht 172.7 cm (67.99\")   Wt 120 kg (265 lb)   BMI 40.30 kg/m²       Review of Systems   Constitutional:  Denies recent weight loss, weight gain, fever or chills, no change in exercise tolerance  Musculoskeletal: Foot pain. Hammertoe deformity.   Skin:  Dry skin.  Neurological:  Burning sensations to feet b/l.  Psychiatric/Behavioral: Denies depression    Physical Exam   Constitutional: she appears well-developed and well-nourished.   Psychiatric: she has a normal mood and affect. her   behavior is normal.      Lower Extremity Exam:  Vascular: DP pulses palpable 2+. PT not readily palpable. +signal on doppler  Negative hair growth.   Mild to moderate left foot edema  Negative Conrad  Mild ecchymosis to lateral " midfoot  Neuro: Protective sensation absent to foot, b/l. Reestablished at mid calf   DTRs intact  Vibratory sensation diminished.  Integument: Left foot incisions coapted with sutures in place.  No signs of infection  Diffuse xerosis b/l.  Webspaces c/d/i  Musculoskeletal: Left ankle range of motion intact        Procedures        ASSESSMENT AND PLAN    Ariana was seen today for post-op follow-up.    Diagnoses and all orders for this visit:    Left foot pain  -     XR Foot 3+ View Left      -Radiographs ordered and reviewed.  Fifth metatarsal hardware is intact without any signs of failure or loosening.  Her calcaneal osteotomy appears to have translated or rotated slightly more dorsally than prior exam.  The osteotomy remains well post without significant displacement.  Hardware is still intact without obvious signs of failure.  -Well padded below-knee fiberglass cast applied today  -Continue strict nonweightbearing with the assistance of walker and wheelchair  -Follow up 1 week with Dr. Li for suture removal and cast change          This document has been electronically signed by Maira Epstein MA on January 29, 2019 8:44 AM     Maira Epstein MA  1/29/2019  8:44 AM

## 2019-02-03 ENCOUNTER — APPOINTMENT (OUTPATIENT)
Dept: GENERAL RADIOLOGY | Facility: HOSPITAL | Age: 59
End: 2019-02-03

## 2019-02-03 ENCOUNTER — HOSPITAL ENCOUNTER (EMERGENCY)
Facility: HOSPITAL | Age: 59
Discharge: HOME | End: 2019-02-03
Attending: EMERGENCY MEDICINE | Admitting: EMERGENCY MEDICINE

## 2019-02-03 VITALS
WEIGHT: 250 LBS | RESPIRATION RATE: 18 BRPM | BODY MASS INDEX: 37.89 KG/M2 | DIASTOLIC BLOOD PRESSURE: 70 MMHG | OXYGEN SATURATION: 98 % | HEART RATE: 86 BPM | SYSTOLIC BLOOD PRESSURE: 154 MMHG | TEMPERATURE: 97.8 F | HEIGHT: 68 IN

## 2019-02-03 DIAGNOSIS — Z47.89 CAST DISCOMFORT: Primary | ICD-10-CM

## 2019-02-03 PROCEDURE — 73630 X-RAY EXAM OF FOOT: CPT

## 2019-02-03 PROCEDURE — 99284 EMERGENCY DEPT VISIT MOD MDM: CPT

## 2019-02-03 PROCEDURE — 73610 X-RAY EXAM OF ANKLE: CPT

## 2019-02-04 NOTE — ED PROVIDER NOTES
Subjective   59 year old female a few weeks post op from 5th metatarsal and heel surgery per podiatry presents to the ED with complaint of pain from her cast. She reports the top of her ankle and the medial aspect of her foot near an incision is becoming painful. She has been elevating but still feels swollen. She denies fevers or chills. She is not on antibiotics. Her cast was changed within the last week. She states she has been non weight bearing.    Family history, surgical history, social history, current medications and allergies are reviewed with the patient and triage documentation and vitals are reviewed.          History provided by:  Patient and spouse   used: No        Review of Systems   Constitutional: Negative for chills and fever.   HENT: Negative.    Eyes: Negative.    Respiratory: Negative for shortness of breath.    Cardiovascular: Negative for chest pain.   Gastrointestinal: Negative for nausea and vomiting.   Endocrine: Negative.    Genitourinary: Negative.    Musculoskeletal: Positive for arthralgias and joint swelling. Negative for myalgias.   Skin: Positive for wound. Negative for color change and rash.   Allergic/Immunologic: Negative.    Neurological: Negative for dizziness and light-headedness.   Hematological: Negative for adenopathy. Does not bruise/bleed easily.   Psychiatric/Behavioral: Negative.        Past Medical History:   Diagnosis Date   • Angina, class IV (CMS/HCC)    • Anxiety    • Benign paroxysmal positional vertigo    • Carpal tunnel syndrome    • Chronic pain    • Coronary atherosclerosis     hx CABG 2005.  is followed by Dr Kwon   • Depression    • Diabetes mellitus (CMS/HCC)     Type 2, controlled   • Diabetic polyneuropathy (CMS/HCC)    • Elevated cholesterol    • Female stress incontinence    • Gastroparesis    • GERD (gastroesophageal reflux disease)    • Hyperlipidemia    • Hypertension    • Low back pain    • Malaise and fatigue    • Multiple  joint pain    • Obesity     Refuses to be weighed   • Otalgia     Both   • Perforation of tympanic membrane     Left   • Postoperative wound infection    • Vitamin D deficiency        Allergies   Allergen Reactions   • Seroquel [Quetiapine Fumarate] Anaphylaxis   • Avandia [Rosiglitazone] Swelling   • Morphine And Related Hallucinations   • Oxycodone Hallucinations       Past Surgical History:   Procedure Laterality Date   • ABDOMINAL SURGERY     • ANGIOPLASTY      coronary   • BREAST BIOPSY Right    • CARDIAC CATHETERIZATION     • CARDIAC CATHETERIZATION N/A 6/20/2017    Procedure: Right Heart Cath;  Surgeon: Can Kwon MD PhD;  Location: [facility] CATH INVASIVE LOCATION;  Service:    • CARPAL TUNNEL RELEASE     • CHOLECYSTECTOMY     • CORONARY ARTERY BYPASS GRAFT  2005   • ENDOSCOPY N/A 10/19/2018    Procedure: ESOPHAGOGASTRODUODENOSCOPY possible dilation;  Surgeon: Julián Maldonado MD;  Location: [facility] ENDOSCOPY;  Service: Gastroenterology   • ENDOSCOPY AND COLONOSCOPY     • FOOT SURGERY      Toes   • GASTRIC BANDING      Revision, laparoscopic   • HYSTERECTOMY     • MOUTH SURGERY     • SALPINGO OOPHORECTOMY     • SHOULDER SURGERY     • SUBTALAR ARTHRODESIS Left 1/16/2019    Procedure: LEFT FOOT HARDWARE REMOVAL, FIFTH METATARSAL , OPEN REDUCTION INTERNAL FIXATION, CALCANEAL OSTEOTOMY;  Surgeon: Ignacio Lord DPM;  Location: [facility] OR;  Service: Podiatry   • TRANSESOPHAGEAL ECHOCARDIOGRAM (LAMONTE)      With color flow       Family History   Problem Relation Age of Onset   • Diabetes Other    • Heart disease Other    • Hypertension Other    • Heart disease Mother    • Stroke Mother    • Hypertension Mother    • Diabetes Sister    • Heart disease Sister    • Hypertension Sister    • Heart disease Sister    • Diabetes Sister    • Hypertension Sister    • Diabetes Sister    • Diabetes Sister    • Diabetes Sister    • Diabetes Sister        Social History     Socioeconomic History   • Marital status:       Spouse name: Not on file   • Number of children: Not on file   • Years of education: Not on file   • Highest education level: Not on file   Tobacco Use   • Smoking status: Never Smoker   • Smokeless tobacco: Never Used   Substance and Sexual Activity   • Alcohol use: No   • Drug use: No   • Sexual activity: Defer           Objective   Physical Exam   Constitutional: She is oriented to person, place, and time. She appears well-developed and well-nourished.   HENT:   Head: Normocephalic.   Mouth/Throat: Oropharynx is clear and moist.   Eyes: Pupils are equal, round, and reactive to light.   Neck: Normal range of motion.   Cardiovascular: Normal rate and regular rhythm.   Pulmonary/Chest: Effort normal and breath sounds normal.   Abdominal: Soft. Bowel sounds are normal.        Neurological: She is alert and oriented to person, place, and time.   Skin: Skin is warm. Capillary refill takes less than 2 seconds.   Nursing note and vitals reviewed.  Incision sites clean and dry with intact sutures, not erythematous or warm.    Procedures  none         ED Course    Labs Reviewed - No data to display  Xr Ankle 3+ View Left    Result Date: 2/3/2019  Narrative: EXAM:  Radiograph(s), Osseous       REGION:   Ankle  SIDE:     Left   VIEWS:   3         INDICATION:    cast pain, post op   COMPARISON:    9/6/17, left foot 1/22/19          FINDINGS:       Status post calcaneal repair, with healing vertically oriented fracture. There is sideplate and screw fixation of a lateral foot fracture, overall configuration unchanged. The overall radiographic appearance is unchanged from 1/22/19. .        Impression: CONCLUSION:       1. Postoperative changes, radiographically unchanged.          Note:  if pain or symptoms persist beyond reasonable expectations and follow-up imaging is anticipated,  cross sectional imaging (MRI) is suggested, as is deemed clinically appropriate.            Electronically signed by:  ASHLEY Perrin  Roberto COREAS  2/3/2019 8:51 PM CST Workstation: [id number]    Xr Foot 3+ View Left    Result Date: 2/3/2019  Narrative: Left foot three view on 2/3/2019 CLINICAL INDICATION: Cast pain, postop COMPARISON: 1/29/2019 FINDINGS: Two screws are again noted traversing likely osteotomy site in the posterior calcaneus. There is again noted plate and screw fixation along the proximal fifth metatarsal. There remains an ununited portion of the tip of the base of the fifth metatarsal. There is diffuse osteopenia. There is partial fusion of the lateral midfoot. No acute fracture is noted. There is no dislocation. No other bony abnormality is noted.     Impression: Stable exam. Electronically signed by:  Anthony Dexter  2/3/2019 9:02 PM [facility] Workstation: [id number]    Xr Foot 3+ View Left    Result Date: 1/31/2019  Narrative: Exam: 3 view radiographs left foot Comparison Studies: 1/22/2019 Indication: Follow-up ORIF fifth metatarsal and calcaneal osteotomy Findings: Normal osseous density.  No acute fractures, erosions or subluxations.  Redemonstration of open reduction internal fixation of fifth metatarsal nonunion with lateral hook plate intact.  No evidence of hardware loosening or failure.  Fractures appear well opposed.  Redemonstration of prior calcaneal osteotomy with 2 screw fixation.  No evidence of screw loosening or failure however there is slight progressive dorsal translation/rotation of the posterior tuberosity compared to prior exam.  The osteotomy does appear well compressed. Result: As above.  Stable postoperative exam     Xr Foot 3+ View Left    Result Date: 1/23/2019  Narrative: Exam: Three-view radiographs left foot Comparison Studies: 11/9/2018 Indication: One week postoperative exam, patient fell over a weekend Findings: Normal osseous density.  No acute fractures, erosions or subluxations.  There has been interval removal of intramedullary screw fixation and additional plate fixation of left fifth metatarsal  nonunion.  Hardware seems intact without signs of loosening or failure.  Fracture site seems to be healing.  There is also interval fixation of calcaneal body osteotomy with 2 cannulated screws.  No evidence of hardware loosening or failure.  Stable alignment.  No other significant changes from prior exam. Result: As above.  Stable postoperative exam.           MDM  Number of Diagnoses or Management Options  Cast discomfort:      Amount and/or Complexity of Data Reviewed  Tests in the radiology section of CPT®: reviewed    Patient Progress  Patient progress: stable    Vital signs stable. Afebrile. Discomfort resolved with cast removal. No overt swelling or signs of infection. Xray imaging reveals stable post operative changes. Patient placed in posterior short leg and sugar tong splint. Will follow-up with podiatry for re-evaluation and cast replacement tomorrow.     Final diagnoses:   Cast discomfort            Young Esteves DO  02/06/19 0012

## 2019-02-04 NOTE — ED NOTES
Patient presents to ED with complaint of her cast being too tight. She states it feels like the cast around her toes is blocking her range of motion. She states minimal loss of sensation to the foot.The cast was placed one week ago. Pt complain of tightness around her ankle and around the lateral part of her left foot where she has stitches.      Gay Hernandez RN  02/03/19 1933

## 2019-02-05 ENCOUNTER — OFFICE VISIT (OUTPATIENT)
Dept: PODIATRY | Facility: CLINIC | Age: 59
End: 2019-02-05

## 2019-02-05 VITALS — OXYGEN SATURATION: 98 % | BODY MASS INDEX: 37.89 KG/M2 | HEART RATE: 83 BPM | HEIGHT: 68 IN | WEIGHT: 250 LBS

## 2019-02-05 DIAGNOSIS — S92.352K DISPLACED FRACTURE OF FIFTH METATARSAL BONE, LEFT FOOT, SUBSEQUENT ENCOUNTER FOR FRACTURE WITH NONUNION: Primary | ICD-10-CM

## 2019-02-05 DIAGNOSIS — Q66.70 PES CAVUS: ICD-10-CM

## 2019-02-05 PROCEDURE — 29405 APPL SHORT LEG CAST: CPT | Performed by: PODIATRIST

## 2019-02-05 PROCEDURE — 99024 POSTOP FOLLOW-UP VISIT: CPT | Performed by: PODIATRIST

## 2019-02-05 NOTE — PROGRESS NOTES
Arianatorsten Martinez  1960  59 y.o. female   WILMAR Fong - 12/12/18  BS - 153 per patient     Patient presents today for post op recheck.    Chief Complaint   Patient presents with   • Left Lower Leg - post op recheck       History of Present Illness    Ms Martinez is a 58 y/o diabetic female who presents for f/u left foot revision fifth metatarsal fracture nonunion and Rich calcaneal osteotomy.  Date of surgery 1/16/2019.  She states that 2 days ago she had the cast removed in the emergency department due to discomfort.  They placed her into a posterior mold splint.  She continues to be nonweightbearing to the left lower extremity.  She relates to moderate postoperative pain .    Past Medical History:   Diagnosis Date   • Angina, class IV (CMS/HCC)    • Anxiety    • Benign paroxysmal positional vertigo    • Carpal tunnel syndrome    • Chronic pain    • Coronary atherosclerosis     hx CABG 2005.  is followed by Dr Kwon   • Depression    • Diabetes mellitus (CMS/HCC)     Type 2, controlled   • Diabetic polyneuropathy (CMS/HCC)    • Elevated cholesterol    • Female stress incontinence    • Gastroparesis    • GERD (gastroesophageal reflux disease)    • Hyperlipidemia    • Hypertension    • Low back pain    • Malaise and fatigue    • Multiple joint pain    • Obesity     Refuses to be weighed   • Otalgia     Both   • Perforation of tympanic membrane     Left   • Postoperative wound infection    • Vitamin D deficiency          Past Surgical History:   Procedure Laterality Date   • ABDOMINAL SURGERY     • ANGIOPLASTY      coronary   • BREAST BIOPSY Right    • CARDIAC CATHETERIZATION     • CARDIAC CATHETERIZATION N/A 6/20/2017    Procedure: Right Heart Cath;  Surgeon: Can Kwon MD PhD;  Location: [facility] INVASIVE LOCATION;  Service:    • CARPAL TUNNEL RELEASE     • CHOLECYSTECTOMY     • CORONARY ARTERY BYPASS GRAFT  2005   • ENDOSCOPY N/A 10/19/2018    Procedure: ESOPHAGOGASTRODUODENOSCOPY possible  dilation;  Surgeon: Julián Maldonado MD;  Location: [facility] ENDOSCOPY;  Service: Gastroenterology   • ENDOSCOPY AND COLONOSCOPY     • FOOT SURGERY      Toes   • GASTRIC BANDING      Revision, laparoscopic   • HYSTERECTOMY     • MOUTH SURGERY     • SALPINGO OOPHORECTOMY     • SHOULDER SURGERY     • SUBTALAR ARTHRODESIS Left 1/16/2019    Procedure: LEFT FOOT HARDWARE REMOVAL, FIFTH METATARSAL , OPEN REDUCTION INTERNAL FIXATION, CALCANEAL OSTEOTOMY;  Surgeon: Ignacio Lord DPM;  Location: [facility] OR;  Service: Podiatry   • TRANSESOPHAGEAL ECHOCARDIOGRAM (LAMONTE)      With color flow         Family History   Problem Relation Age of Onset   • Diabetes Other    • Heart disease Other    • Hypertension Other    • Heart disease Mother    • Stroke Mother    • Hypertension Mother    • Diabetes Sister    • Heart disease Sister    • Hypertension Sister    • Heart disease Sister    • Diabetes Sister    • Hypertension Sister    • Diabetes Sister    • Diabetes Sister    • Diabetes Sister    • Diabetes Sister          Social History     Socioeconomic History   • Marital status:      Spouse name: Not on file   • Number of children: Not on file   • Years of education: Not on file   • Highest education level: Not on file   Social Needs   • Financial resource strain: Not on file   • Food insecurity - worry: Not on file   • Food insecurity - inability: Not on file   • Transportation needs - medical: Not on file   • Transportation needs - non-medical: Not on file   Occupational History   • Not on file   Tobacco Use   • Smoking status: Never Smoker   • Smokeless tobacco: Never Used   Substance and Sexual Activity   • Alcohol use: No   • Drug use: No   • Sexual activity: Defer   Other Topics Concern   • Not on file   Social History Narrative   • Not on file         Current Outpatient Medications   Medication Sig Dispense Refill   • ARIPiprazole (ABILIFY) 15 MG tablet Take 15 mg by mouth Daily.     • ARIPiprazole (ABILIFY) 15 MG  tablet TAKE 1 TABLET BY MOUTH DAILY. 30 tablet 0   • aspirin 81 MG chewable tablet Chew 81 mg daily.     • atorvastatin (LIPITOR) 40 MG tablet TAKE 1 TABLET BY MOUTH EVERY NIGHT. 90 tablet 1   • BD SHARPS CONTAINER HOME misc 1 each Take As Directed. 1 each 0   • Blood Glucose Monitoring Suppl (ONE TOUCH ULTRA MINI) w/Device kit USE AS DIRECTED TO CHECK BLOOD SUGAR 1 each 0   • Blood Glucose Monitoring Suppl (ONE TOUCH ULTRA MINI) w/Device kit USE 4 TIMES A DAY 1 each 0   • Calcium Citrate-Vitamin D (CITRACAL/VITAMIN D) 250-200 MG-UNIT tablet Take 2 tablets by mouth 2 (two) times a day.     • clopidogrel (PLAVIX) 75 MG tablet Take 75 mg by mouth Daily.     • cyanocobalamin 1000 MCG/ML injection Inject 1 mL into the appropriate muscle as directed by prescriber Every 28 (Twenty-Eight) Days. 1 mL 0   • furosemide (LASIX) 40 MG tablet Take 1 tablet by mouth Daily. 90 tablet 3   • gabapentin (NEURONTIN) 400 MG capsule Take 400 mg by mouth 3 (Three) Times a Day.     • GLUCAGON EMERGENCY 1 MG injection USE AS DIRECTED AS NEEDED 1 kit 0   • glucose blood (ONE TOUCH ULTRA TEST) test strip 1 each by Other route 4 (Four) Times a Day. Use as instructed 400 each 1   • hydrochlorothiazide (MICROZIDE) 12.5 MG capsule Take 12.5 mg by mouth Daily.     • HYDROcodone-acetaminophen (NORCO)  MG per tablet Take 1 tablet by mouth Every 4 (Four) Hours As Needed for Moderate Pain  or Severe Pain . 60 tablet 0   • insulin aspart (novoLOG FLEXPEN) 100 UNIT/ML solution pen-injector sc pen Inject 60 Units under the skin into the appropriate area as directed 3 (Three) Times a Day With Meals.     • Insulin Glargine (BASAGLAR KWIKPEN) 100 UNIT/ML injection pen Inject 100 Units under the skin into the appropriate area as directed Every Night.     • Insulin Pen Needle (B-D ULTRAFINE III SHORT PEN) 31G X 8 MM misc Inject 1 each into the shoulder, thigh, or buttocks 4 (Four) Times a Day. use as directed 150 each 11   • LORazepam (ATIVAN) 0.5 MG  "tablet Take 1 tablet by mouth Daily As Needed for Anxiety. 15 tablet 2   • meclizine (ANTIVERT) 25 MG tablet Take 1 tablet by mouth 3 (Three) Times a Day As Needed for dizziness. 90 tablet 6   • meloxicam (MOBIC) 15 MG tablet Take 1 tablet by mouth Daily. Take once daily. 30 tablet 0   • metoclopramide (REGLAN) 10 MG tablet Take 10 mg by mouth 4 (Four) Times a Day As Needed.     • metoprolol succinate XL (TOPROL-XL) 25 MG 24 hr tablet TAKE 1 TABLET BY MOUTH DAILY. 31 tablet 6   • mirtazapine (REMERON) 45 MG tablet TAKE 1 TABLET BY MOUTH EVERY NIGHT. 90 tablet 1   • ONE TOUCH ULTRA TEST test strip USE TO TEST SUGAR 4 TIMES A  each 3   • pantoprazole (PROTONIX) 40 MG EC tablet Take 40 mg by mouth Daily.     • SITagliptin (JANUVIA) 100 MG tablet Take 100 mg by mouth Daily.     • Syringe/Needle, Disp, (LUER LOCK SAFETY SYRINGES) 25G X 5/8\" 3 ML misc 1 each Daily. 100 each 0   • venlafaxine XR (EFFEXOR-XR) 150 MG 24 hr capsule Take 150 mg by mouth 2 (Two) Times a Day.     • vitamin D (ERGOCALCIFEROL) 80141 units capsule capsule Take 50,000 Units by mouth 1 (One) Time Per Week. sunday       No current facility-administered medications for this visit.      Review of Systems   Constitutional: Negative.    HENT: Negative.    Respiratory: Negative.    Musculoskeletal:        Left foot pain    Neurological: Positive for numbness.   Psychiatric/Behavioral: Negative.          OBJECTIVE    Pulse 83   Ht 172.7 cm (68\")   Wt 113 kg (250 lb)   SpO2 98%   BMI 38.01 kg/m²         Physical Exam   Constitutional: she appears well-developed and well-nourished.   Psychiatric: she has a normal mood and affect. her   behavior is normal.      Lower Extremity Exam:  Vascular: DP pulses palpable 2+. PT not readily palpable. +signal on doppler  Negative hair growth.   Mild to moderate left foot edema  Negative Conrad  Mild ecchymosis to lateral midfoot  Neuro: Protective sensation absent to foot, b/l. Reestablished at mid calf   DTRs " intact  Vibratory sensation diminished.  Integument: Left foot incisions coapted with sutures in place.  No signs of infection  Diffuse xerosis b/l.  Webspaces c/d/i  Musculoskeletal: Left ankle range of motion intact        Procedures        ASSESSMENT AND PLAN    Ariana was seen today for post op recheck.    Diagnoses and all orders for this visit:    Displaced fracture of fifth metatarsal bone, left foot, subsequent encounter for fracture with nonunion    Pes cavus        - Patient is doing well postoperatively.    - Sutures removed and sterile dressing applied.    - Applied nonweightbearing short leg fiberglass cast in gravity equinus   - All her questions were answered   - Recheck 2 weeks with Dr. Lord           This document has been electronically signed by Kaleb Li DPM on February 5, 2019 5:18 PM

## 2019-02-06 ENCOUNTER — LAB (OUTPATIENT)
Dept: LAB | Facility: OTHER | Age: 59
End: 2019-02-06

## 2019-02-06 ENCOUNTER — OFFICE VISIT (OUTPATIENT)
Dept: FAMILY MEDICINE CLINIC | Facility: CLINIC | Age: 59
End: 2019-02-06

## 2019-02-06 VITALS
HEART RATE: 77 BPM | SYSTOLIC BLOOD PRESSURE: 128 MMHG | OXYGEN SATURATION: 100 % | DIASTOLIC BLOOD PRESSURE: 80 MMHG | TEMPERATURE: 98.3 F | WEIGHT: 250 LBS | BODY MASS INDEX: 37.89 KG/M2 | HEIGHT: 68 IN

## 2019-02-06 DIAGNOSIS — I10 ESSENTIAL HYPERTENSION: ICD-10-CM

## 2019-02-06 DIAGNOSIS — E11.42 TYPE 2 DIABETES MELLITUS WITH DIABETIC POLYNEUROPATHY, WITH LONG-TERM CURRENT USE OF INSULIN (HCC): ICD-10-CM

## 2019-02-06 DIAGNOSIS — M15.9 PRIMARY OSTEOARTHRITIS INVOLVING MULTIPLE JOINTS: ICD-10-CM

## 2019-02-06 DIAGNOSIS — Z51.81 THERAPEUTIC DRUG MONITORING: ICD-10-CM

## 2019-02-06 DIAGNOSIS — Z79.4 TYPE 2 DIABETES MELLITUS WITH DIABETIC POLYNEUROPATHY, WITH LONG-TERM CURRENT USE OF INSULIN (HCC): ICD-10-CM

## 2019-02-06 DIAGNOSIS — F41.1 GENERALIZED ANXIETY DISORDER: Primary | ICD-10-CM

## 2019-02-06 DIAGNOSIS — Z97.8 CAST IN PLACE ON EXTREMITY: ICD-10-CM

## 2019-02-06 PROBLEM — J06.9 ACUTE UPPER RESPIRATORY INFECTION: Status: RESOLVED | Noted: 2018-12-01 | Resolved: 2019-02-06

## 2019-02-06 LAB — ALBUMIN UR-MCNC: 2.6 MG/L

## 2019-02-06 PROCEDURE — G0481 DRUG TEST DEF 8-14 CLASSES: HCPCS | Performed by: FAMILY MEDICINE

## 2019-02-06 PROCEDURE — 82043 UR ALBUMIN QUANTITATIVE: CPT | Performed by: FAMILY MEDICINE

## 2019-02-06 PROCEDURE — 99214 OFFICE O/P EST MOD 30 MIN: CPT | Performed by: FAMILY MEDICINE

## 2019-02-06 PROCEDURE — 80307 DRUG TEST PRSMV CHEM ANLYZR: CPT | Performed by: FAMILY MEDICINE

## 2019-02-06 RX ORDER — HYDROCODONE BITARTRATE AND ACETAMINOPHEN 10; 325 MG/1; MG/1
1 TABLET ORAL EVERY 4 HOURS PRN
Qty: 60 TABLET | Refills: 0 | Status: CANCELLED | OUTPATIENT
Start: 2019-02-06

## 2019-02-06 RX ORDER — LORAZEPAM 0.5 MG/1
0.5 TABLET ORAL DAILY PRN
Qty: 30 TABLET | Refills: 0 | Status: SHIPPED | OUTPATIENT
Start: 2019-02-06 | End: 2019-03-08 | Stop reason: SDUPTHER

## 2019-02-06 NOTE — PROGRESS NOTES
"Subjective   Chief Complaint   Patient presents with   • Pain     3 month follow up, medication refill     Ariana Martinez is a 59 y.o. female.   Pain (3 month follow up, medication refill)    History of Present Illness     Diabetes - managed with insulin  Followed by endocrinology  Average readings of FBG levels are 100-140  Currently prescribed novolog, tresiba  Has previously failed metformin, bydureon, jardiance, januvia  Diabetic eye exam performed by Dr England    Left foot revision fifth metatarsal fracture nonunion and cazares calcaneal osteotomy  Surgery was performed 1/16/19 with Dr Lord  Follow up was just performed with Dr Li 2/5/19  Cast in place - motor and sensory intact  C/o anxiety with cast    Due for uds  Due for microalbuminuria    Shingles second dose done [facility]     The following portions of the patient's history were reviewed and updated as appropriate: allergies, current medications, past family history, past medical history, past social history, past surgical history and problem list.    Review of Systems   Constitutional: Negative for appetite change, chills, fatigue and fever.   HENT: Negative for congestion, ear pain, rhinorrhea and sore throat.    Eyes: Negative for pain.   Respiratory: Negative for cough and shortness of breath.    Cardiovascular: Negative for chest pain and palpitations.   Gastrointestinal: Negative for abdominal pain, constipation, diarrhea and nausea.   Genitourinary: Negative for dysuria.   Musculoskeletal: Negative for back pain, joint swelling and neck pain.   Skin: Negative for rash.   Neurological: Negative for dizziness and headaches.   Psychiatric/Behavioral: The patient is nervous/anxious.        Objective   /80   Pulse 77   Temp 98.3 °F (36.8 °C) (Tympanic)   Ht 172.7 cm (68\")   Wt 113 kg (250 lb) Comment: per patient, she could not stand to weigh  SpO2 100%   BMI 38.01 kg/m²   Physical Exam   Constitutional: She is oriented to person, place, " and time. She appears well-developed and well-nourished.   HENT:   Head: Normocephalic and atraumatic.   Eyes: Pupils are equal, round, and reactive to light.   Neck: Normal range of motion. Neck supple.   Cardiovascular: Normal rate, regular rhythm and normal heart sounds.   Pulmonary/Chest: Effort normal and breath sounds normal. No respiratory distress. She has no wheezes. She has no rales.   Abdominal: Soft. Bowel sounds are normal.   Musculoskeletal:   Left foot casted  Examined in wheelchair  Motor and sensory intact   Neurological: She is alert and oriented to person, place, and time.   Skin: Skin is warm and dry.   Psychiatric: She has a normal mood and affect.   Nursing note and vitals reviewed.      Assessment/Plan   Problems Addressed this Visit        Cardiovascular and Mediastinum    Hypertension       Musculoskeletal and Integument    Primary osteoarthritis involving multiple joints       Other    Generalized anxiety disorder - Primary    Relevant Medications    LORazepam (ATIVAN) 0.5 MG tablet      Other Visit Diagnoses     Therapeutic drug monitoring        Relevant Orders    ToxASSURE Select 13 (MW) - Urine, Clean Catch    Type 2 diabetes mellitus with diabetic polyneuropathy, with long-term current use of insulin (CMS/[facility])        Relevant Orders    MicroAlbumin, Urine, Random - Urine, Clean Catch    Cast in place on extremity            The patient has read and signed the [facility] Controlled Substance Contract.  I will continue to see patient for regular follow up appointments.  They are well controlled on their medication.  LESTER is updated every 3 months. The patient is aware of the potential for addiction and dependence.  uds today  Adjusted ativan - temporary increase to daily due to increased anxiety secondary to cast placement  Pain medication not due until the end of the month  Discussed future plans for long term pain medication - advised the patient to be looking for a new primary  care provider - will no longer be available after July this year.      Patient's Body mass index is 38.01 kg/m². BMI is above normal parameters. Recommendations include: exercise counseling and nutrition counseling.    Microalbuminuria today    Follow up with podiatry as scheduled    Recheck in 4 months

## 2019-02-08 DIAGNOSIS — R60.9 FLUID RETENTION: ICD-10-CM

## 2019-02-08 RX ORDER — FUROSEMIDE 40 MG/1
40 TABLET ORAL DAILY
Qty: 90 TABLET | Refills: 3 | Status: SHIPPED | OUTPATIENT
Start: 2019-02-08 | End: 2019-02-08 | Stop reason: SDUPTHER

## 2019-02-08 RX ORDER — FUROSEMIDE 40 MG/1
40 TABLET ORAL DAILY
Qty: 90 TABLET | Refills: 1 | Status: SHIPPED | OUTPATIENT
Start: 2019-02-08 | End: 2019-09-03

## 2019-02-13 LAB — CONV REPORT SUMMARY: NORMAL

## 2019-02-14 ENCOUNTER — OFFICE VISIT (OUTPATIENT)
Dept: WOUND CARE | Facility: HOSPITAL | Age: 59
End: 2019-02-14

## 2019-02-14 ENCOUNTER — HOSPITAL ENCOUNTER (OUTPATIENT)
Dept: GENERAL RADIOLOGY | Facility: HOSPITAL | Age: 59
Discharge: HOME | End: 2019-02-14
Admitting: NURSE PRACTITIONER

## 2019-02-14 ENCOUNTER — LAB REQUISITION (OUTPATIENT)
Dept: LAB | Facility: HOSPITAL | Age: 59
End: 2019-02-14

## 2019-02-14 DIAGNOSIS — L89.513 STAGE III PRESSURE ULCER OF RIGHT ANKLE (HCC): ICD-10-CM

## 2019-02-14 LAB
ALBUMIN SERPL-MCNC: 4.5 G/DL (ref 3.4–4.8)
ALBUMIN/GLOB SERPL: 1.3 G/DL (ref 1.1–1.8)
ALP SERPL-CCNC: 169 U/L (ref 38–126)
ALT SERPL W P-5'-P-CCNC: 29 U/L (ref 9–52)
ANION GAP SERPL CALCULATED.3IONS-SCNC: 12 MMOL/L (ref 5–15)
AST SERPL-CCNC: 36 U/L (ref 14–36)
BASOPHILS # BLD AUTO: 0.09 10*3/MM3 (ref 0–0.2)
BASOPHILS NFR BLD AUTO: 0.6 % (ref 0–1.5)
BILIRUB SERPL-MCNC: 0.3 MG/DL (ref 0.2–1.3)
BUN BLD-MCNC: 18 MG/DL (ref 7–21)
BUN/CREAT SERPL: 14.6 (ref 7–25)
CALCIUM SPEC-SCNC: 10 MG/DL (ref 8.4–10.2)
CHLORIDE SERPL-SCNC: 93 MMOL/L (ref 95–110)
CO2 SERPL-SCNC: 32 MMOL/L (ref 22–31)
CREAT BLD-MCNC: 1.23 MG/DL (ref 0.5–1)
DEPRECATED RDW RBC AUTO: 41.3 FL (ref 37–54)
EOSINOPHIL # BLD AUTO: 0.48 10*3/MM3 (ref 0–0.4)
EOSINOPHIL NFR BLD AUTO: 3.2 % (ref 0.3–6.2)
ERYTHROCYTE [DISTWIDTH] IN BLOOD BY AUTOMATED COUNT: 14 % (ref 12.3–15.4)
GFR SERPL CREATININE-BSD FRML MDRD: 45 ML/MIN/1.73 (ref 51–120)
GLOBULIN UR ELPH-MCNC: 3.6 GM/DL (ref 2.3–3.5)
GLUCOSE BLD-MCNC: 151 MG/DL (ref 60–100)
GLUCOSE BLDC GLUCOMTR-MCNC: 185 MG/DL (ref 70–130)
HCT VFR BLD AUTO: 45 % (ref 34–46.6)
HGB BLD-MCNC: 14.8 G/DL (ref 12–15.9)
IMM GRANULOCYTES # BLD AUTO: 0.05 10*3/MM3 (ref 0–0.05)
IMM GRANULOCYTES NFR BLD AUTO: 0.3 % (ref 0–0.5)
LYMPHOCYTES # BLD AUTO: 4.04 10*3/MM3 (ref 0.7–3.1)
LYMPHOCYTES NFR BLD AUTO: 26.9 % (ref 19.6–45.3)
MCH RBC QN AUTO: 27 PG (ref 26.6–33)
MCHC RBC AUTO-ENTMCNC: 32.9 G/DL (ref 31.5–35.7)
MCV RBC AUTO: 82 FL (ref 79–97)
MONOCYTES # BLD AUTO: 1.05 10*3/MM3 (ref 0.1–0.9)
MONOCYTES NFR BLD AUTO: 7 % (ref 5–12)
NEUTROPHILS # BLD AUTO: 9.32 10*3/MM3 (ref 1.4–7)
NEUTROPHILS NFR BLD AUTO: 62 % (ref 42.7–76)
NRBC BLD AUTO-RTO: 0 /100 WBC (ref 0–0)
PLATELET # BLD AUTO: 459 10*3/MM3 (ref 140–450)
PMV BLD AUTO: 9.8 FL (ref 6–12)
POTASSIUM BLD-SCNC: 3 MMOL/L (ref 3.5–5.1)
PROT SERPL-MCNC: 8.1 G/DL (ref 6.3–8.6)
RBC # BLD AUTO: 5.49 10*6/MM3 (ref 3.77–5.28)
SODIUM BLD-SCNC: 137 MMOL/L (ref 137–145)
WBC NRBC COR # BLD: 15.03 10*3/MM3 (ref 3.4–10.8)

## 2019-02-14 PROCEDURE — 82962 GLUCOSE BLOOD TEST: CPT | Performed by: NURSE PRACTITIONER

## 2019-02-14 PROCEDURE — 36415 COLL VENOUS BLD VENIPUNCTURE: CPT | Performed by: NURSE PRACTITIONER

## 2019-02-14 PROCEDURE — 85025 COMPLETE CBC W/AUTO DIFF WBC: CPT | Performed by: NURSE PRACTITIONER

## 2019-02-14 PROCEDURE — 73610 X-RAY EXAM OF ANKLE: CPT

## 2019-02-14 PROCEDURE — 80053 COMPREHEN METABOLIC PANEL: CPT | Performed by: NURSE PRACTITIONER

## 2019-02-14 PROCEDURE — G0463 HOSPITAL OUTPT CLINIC VISIT: HCPCS

## 2019-02-15 DIAGNOSIS — F33.1 DEPRESSION, MAJOR, RECURRENT, MODERATE (HCC): ICD-10-CM

## 2019-02-15 RX ORDER — ARIPIPRAZOLE 15 MG/1
TABLET ORAL
Qty: 30 TABLET | Refills: 0 | Status: SHIPPED | OUTPATIENT
Start: 2019-02-15 | End: 2019-03-14 | Stop reason: SDUPTHER

## 2019-02-20 ENCOUNTER — TELEPHONE (OUTPATIENT)
Dept: FAMILY MEDICINE CLINIC | Facility: CLINIC | Age: 59
End: 2019-02-20

## 2019-02-20 RX ORDER — INSULIN GLARGINE 100 [IU]/ML
100 INJECTION, SOLUTION SUBCUTANEOUS NIGHTLY
Qty: 5 PEN | Refills: 5 | Status: SHIPPED | OUTPATIENT
Start: 2019-02-20 | End: 2019-07-11 | Stop reason: SDUPTHER

## 2019-02-20 NOTE — TELEPHONE ENCOUNTER
Patient called to get refills on her Insulin Glargine (BASAGLAR KWIKPEN) 100 UNIT/ML injection pen send to [facility] pharm

## 2019-02-21 ENCOUNTER — OFFICE VISIT (OUTPATIENT)
Dept: WOUND CARE | Facility: HOSPITAL | Age: 59
End: 2019-02-21

## 2019-02-22 ENCOUNTER — OFFICE VISIT (OUTPATIENT)
Dept: PODIATRY | Facility: CLINIC | Age: 59
End: 2019-02-22

## 2019-02-22 VITALS — HEIGHT: 68 IN | WEIGHT: 249.12 LBS | BODY MASS INDEX: 37.76 KG/M2 | OXYGEN SATURATION: 96 % | HEART RATE: 84 BPM

## 2019-02-22 DIAGNOSIS — Q66.70 PES CAVUS: ICD-10-CM

## 2019-02-22 DIAGNOSIS — E11.42 DIABETIC POLYNEUROPATHY ASSOCIATED WITH TYPE 2 DIABETES MELLITUS (HCC): ICD-10-CM

## 2019-02-22 DIAGNOSIS — S92.352K DISPLACED FRACTURE OF FIFTH METATARSAL BONE, LEFT FOOT, SUBSEQUENT ENCOUNTER FOR FRACTURE WITH NONUNION: Primary | ICD-10-CM

## 2019-02-22 DIAGNOSIS — S91.001A ANKLE WOUND, RIGHT, INITIAL ENCOUNTER: ICD-10-CM

## 2019-02-22 PROCEDURE — 29580 STRAPPING UNNA BOOT: CPT | Performed by: PODIATRIST

## 2019-02-22 PROCEDURE — 99024 POSTOP FOLLOW-UP VISIT: CPT | Performed by: PODIATRIST

## 2019-02-22 PROCEDURE — 29405 APPL SHORT LEG CAST: CPT | Performed by: PODIATRIST

## 2019-02-22 RX ORDER — DOXYCYCLINE 100 MG/1
CAPSULE ORAL
Refills: 0 | COMMUNITY
Start: 2019-02-14 | End: 2019-03-01

## 2019-02-22 RX ORDER — COLLAGENASE SANTYL 250 [ARB'U]/G
OINTMENT TOPICAL
Refills: 5 | COMMUNITY
Start: 2019-02-14 | End: 2019-07-13 | Stop reason: HOSPADM

## 2019-02-22 NOTE — PROGRESS NOTES
Ariana Luis Martinez  1960  59 y.o. female   WILMAR Fong - 02/06/19  BS - 131 per patient     Patient presents today for post op recheck.    02/22/2019    Chief Complaint   Patient presents with   • Left Lower Leg - Post-op Follow-up       History of Present Illness    Ms Martinez is a 60 y/o diabetic female who presents for f/u left foot revision fifth metatarsal fracture nonunion and Rich calcaneal osteotomy.  Date of surgery 1/16/2019.  She has been nonweightbearing to her left lower extremity in a below-knee fiberglass cast.  She states that approximately 2 weeks ago she caused a superficial abrasion as well as a small wound to the outside of her right ankle secondary to trauma from the cast.  She was seen in urgent care and subsequently in the wound care center.  She was started on antibiotics and feels that these lesions are improving.  She has been wrapping the wound daily and applying Santyl.    Past Medical History:   Diagnosis Date   • Angina, class IV (CMS/HCC)    • Anxiety    • Benign paroxysmal positional vertigo    • Carpal tunnel syndrome    • Chronic pain    • Coronary atherosclerosis     hx CABG 2005.  is followed by Dr Kwon   • Depression    • Diabetes mellitus (CMS/HCC)     Type 2, controlled   • Diabetic polyneuropathy (CMS/HCC)    • Elevated cholesterol    • Female stress incontinence    • Gastroparesis    • GERD (gastroesophageal reflux disease)    • Hyperlipidemia    • Hypertension    • Low back pain    • Malaise and fatigue    • Multiple joint pain    • Obesity     Refuses to be weighed   • Otalgia     Both   • Perforation of tympanic membrane     Left   • Postoperative wound infection    • Vitamin D deficiency          Past Surgical History:   Procedure Laterality Date   • ABDOMINAL SURGERY     • ANGIOPLASTY      coronary   • BREAST BIOPSY Right    • CARDIAC CATHETERIZATION     • CARDIAC CATHETERIZATION N/A 6/20/2017    Procedure: Right Heart Cath;  Surgeon: Can Kwon MD  PhD;  Location: [facility] CATH INVASIVE LOCATION;  Service:    • CARPAL TUNNEL RELEASE     • CHOLECYSTECTOMY     • CORONARY ARTERY BYPASS GRAFT  2005   • ENDOSCOPY N/A 10/19/2018    Procedure: ESOPHAGOGASTRODUODENOSCOPY possible dilation;  Surgeon: Julián Maldonado MD;  Location: [facility] ENDOSCOPY;  Service: Gastroenterology   • ENDOSCOPY AND COLONOSCOPY     • FOOT SURGERY      Toes   • GASTRIC BANDING      Revision, laparoscopic   • HYSTERECTOMY     • MOUTH SURGERY     • SALPINGO OOPHORECTOMY     • SHOULDER SURGERY     • SUBTALAR ARTHRODESIS Left 1/16/2019    Procedure: LEFT FOOT HARDWARE REMOVAL, FIFTH METATARSAL , OPEN REDUCTION INTERNAL FIXATION, CALCANEAL OSTEOTOMY;  Surgeon: Ignacio Lord DPM;  Location: [facility] OR;  Service: Podiatry   • TRANSESOPHAGEAL ECHOCARDIOGRAM (LAMONTE)      With color flow         Family History   Problem Relation Age of Onset   • Diabetes Other    • Heart disease Other    • Hypertension Other    • Heart disease Mother    • Stroke Mother    • Hypertension Mother    • Diabetes Sister    • Heart disease Sister    • Hypertension Sister    • Heart disease Sister    • Diabetes Sister    • Hypertension Sister    • Diabetes Sister    • Diabetes Sister    • Diabetes Sister    • Diabetes Sister          Social History     Socioeconomic History   • Marital status:      Spouse name: Not on file   • Number of children: Not on file   • Years of education: Not on file   • Highest education level: Not on file   Social Needs   • Financial resource strain: Not on file   • Food insecurity - worry: Not on file   • Food insecurity - inability: Not on file   • Transportation needs - medical: Not on file   • Transportation needs - non-medical: Not on file   Occupational History   • Not on file   Tobacco Use   • Smoking status: Never Smoker   • Smokeless tobacco: Never Used   Substance and Sexual Activity   • Alcohol use: No   • Drug use: No   • Sexual activity: Defer   Other Topics Concern   • Not on  file   Social History Narrative   • Not on file         Current Outpatient Medications   Medication Sig Dispense Refill   • ARIPiprazole (ABILIFY) 15 MG tablet TAKE 1 TABLET BY MOUTH DAILY. 30 tablet 0   • aspirin 81 MG chewable tablet Chew 81 mg daily.     • atorvastatin (LIPITOR) 40 MG tablet TAKE 1 TABLET BY MOUTH EVERY NIGHT. 90 tablet 1   • BD SHARPS CONTAINER HOME misc 1 each Take As Directed. 1 each 0   • Blood Glucose Monitoring Suppl (ONE TOUCH ULTRA MINI) w/Device kit USE AS DIRECTED TO CHECK BLOOD SUGAR 1 each 0   • Calcium Citrate-Vitamin D (CITRACAL/VITAMIN D) 250-200 MG-UNIT tablet Take 2 tablets by mouth 2 (two) times a day.     • clopidogrel (PLAVIX) 75 MG tablet Take 75 mg by mouth Daily.     • cyanocobalamin 1000 MCG/ML injection Inject 1 mL into the appropriate muscle as directed by prescriber Every 28 (Twenty-Eight) Days. 1 mL 0   • doxycycline (MONODOX) 100 MG capsule TAKE 1 CAPSULE BY MOUTH EVERY 12 HOURS FOR 10 DAYS  0   • furosemide (LASIX) 40 MG tablet Take 1 tablet by mouth Daily. 90 tablet 1   • gabapentin (NEURONTIN) 400 MG capsule Take 400 mg by mouth 3 (Three) Times a Day.     • GLUCAGON EMERGENCY 1 MG injection USE AS DIRECTED AS NEEDED 1 kit 0   • glucose blood (ONE TOUCH ULTRA TEST) test strip 1 each by Other route 4 (Four) Times a Day. Use as instructed 400 each 1   • hydrochlorothiazide (MICROZIDE) 12.5 MG capsule Take 12.5 mg by mouth Daily.     • HYDROcodone-acetaminophen (NORCO)  MG per tablet Take 1 tablet by mouth Every 4 (Four) Hours As Needed for Moderate Pain  or Severe Pain . 60 tablet 0   • insulin aspart (novoLOG FLEXPEN) 100 UNIT/ML solution pen-injector sc pen Inject 60 Units under the skin into the appropriate area as directed 3 (Three) Times a Day With Meals.     • Insulin Glargine (BASAGLAR KWIKPEN) 100 UNIT/ML injection pen Inject 100 Units under the skin into the appropriate area as directed Every Night. 5 pen 5   • Insulin Pen Needle (B-D ULTRAFINE III  "SHORT PEN) 31G X 8 MM misc Inject 1 each into the shoulder, thigh, or buttocks 4 (Four) Times a Day. use as directed 150 each 11   • LORazepam (ATIVAN) 0.5 MG tablet Take 1 tablet by mouth Daily As Needed for Anxiety. 30 tablet 0   • meclizine (ANTIVERT) 25 MG tablet Take 1 tablet by mouth 3 (Three) Times a Day As Needed for dizziness. 90 tablet 6   • meloxicam (MOBIC) 15 MG tablet Take 1 tablet by mouth Daily. Take once daily. 30 tablet 0   • metoclopramide (REGLAN) 10 MG tablet Take 10 mg by mouth 4 (Four) Times a Day As Needed.     • metoprolol succinate XL (TOPROL-XL) 25 MG 24 hr tablet TAKE 1 TABLET BY MOUTH DAILY. 31 tablet 6   • mirtazapine (REMERON) 45 MG tablet TAKE 1 TABLET BY MOUTH EVERY NIGHT. 90 tablet 1   • ONE TOUCH ULTRA TEST test strip USE TO TEST SUGAR 4 TIMES A  each 3   • pantoprazole (PROTONIX) 40 MG EC tablet Take 40 mg by mouth Daily.     • SANTYL 250 UNIT/GM ointment APPLY TO AFFECTED AREA DAILY IN NICKEL INCH THICKNESS  5   • Syringe/Needle, Disp, (LUER LOCK SAFETY SYRINGES) 25G X 5/8\" 3 ML misc 1 each Daily. 100 each 0   • venlafaxine XR (EFFEXOR-XR) 150 MG 24 hr capsule Take 150 mg by mouth 2 (Two) Times a Day.     • vitamin D (ERGOCALCIFEROL) 31964 units capsule capsule Take 50,000 Units by mouth 1 (One) Time Per Week. sunday       No current facility-administered medications for this visit.      Review of Systems   Constitutional: Negative.    HENT: Negative.    Respiratory: Negative.    Musculoskeletal:        Left foot pain    Neurological: Positive for numbness.   Psychiatric/Behavioral: Negative.          OBJECTIVE    Pulse 84   Ht 172.7 cm (67.99\")   Wt 113 kg (249 lb 1.9 oz)   SpO2 96%   BMI 37.89 kg/m²         Physical Exam   Constitutional: she appears well-developed and well-nourished.   Psychiatric: she has a normal mood and affect. her   behavior is normal.      Lower Extremity Exam:  Vascular: DP pulses palpable 2+. PT not readily palpable. +signal on " doppler  Negative hair growth.   Mild to moderate left foot edema  Negative Conrad  Mild ecchymosis to lateral midfoot  Neuro: Protective sensation absent to foot, b/l. Reestablished at mid calf   DTRs intact  Vibratory sensation diminished.  Integument: Left foot incisions well healed.  No signs of infection  Right anterior shin superficial abrasions without signs of infection.  Small 0.5 cm x 0.5 cm ulceration to right lateral malleolus.  No drainage.  Predominately granular base.  Minimal surrounding erythema.  Diffuse xerosis b/l.  Webspaces c/d/i  Musculoskeletal: Left ankle range of motion intact        Procedures        ASSESSMENT AND PLAN    Ariana was seen today for post-op follow-up.    Diagnoses and all orders for this visit:    Displaced fracture of fifth metatarsal bone, left foot, subsequent encounter for fracture with nonunion  -     XR Foot 3+ View Left        -Patient is doing well postoperatively.  Her pain is greatly improved.  -Radiographs of the left foot and ankle ordered and reviewed.  As previously seen following her fall during postoperative week 1 she does have a avulsion of the dorsal one third of the posterior tuberosity of the calcaneus.  The hardware across the osteotomy site appears to be intact.  And there is still bone contact with the osteotomy site and fracture site.  Discussed these findings with the patient and advised to continue with cast immobilization for now.  We will continue to monitor healing of the osteotomy and avulsion fracture site.  -Unna boot applied to right lower extremity to protect from cast abrasion and for edema control.  -Well-padded below-knee fiberglass cast applied to the left lower extremity continue nonweightbearing.  - Recheck 2 weeks            This document has been electronically signed by Ignacio Lord DPM on February 23, 2019 1:35 PM

## 2019-02-27 ENCOUNTER — TELEPHONE (OUTPATIENT)
Dept: ENDOCRINOLOGY | Facility: CLINIC | Age: 59
End: 2019-02-27

## 2019-02-27 RX ORDER — ATORVASTATIN CALCIUM 40 MG/1
40 TABLET, FILM COATED ORAL NIGHTLY
Qty: 90 TABLET | Refills: 1 | Status: SHIPPED | OUTPATIENT
Start: 2019-02-27 | End: 2019-08-21 | Stop reason: SDUPTHER

## 2019-02-28 RX ORDER — HYDROCODONE BITARTRATE AND ACETAMINOPHEN 7.5; 325 MG/1; MG/1
1 TABLET ORAL EVERY 4 HOURS PRN
Qty: 180 TABLET | Refills: 0 | Status: SHIPPED | OUTPATIENT
Start: 2019-02-28 | End: 2019-03-27 | Stop reason: SDUPTHER

## 2019-03-01 ENCOUNTER — OFFICE VISIT (OUTPATIENT)
Dept: ENDOCRINOLOGY | Facility: CLINIC | Age: 59
End: 2019-03-01

## 2019-03-01 ENCOUNTER — OFFICE VISIT (OUTPATIENT)
Dept: PODIATRY | Facility: CLINIC | Age: 59
End: 2019-03-01

## 2019-03-01 VITALS
HEART RATE: 79 BPM | BODY MASS INDEX: 39.24 KG/M2 | HEIGHT: 67 IN | WEIGHT: 250 LBS | DIASTOLIC BLOOD PRESSURE: 86 MMHG | SYSTOLIC BLOOD PRESSURE: 142 MMHG

## 2019-03-01 VITALS — BODY MASS INDEX: 39.24 KG/M2 | HEIGHT: 67 IN | WEIGHT: 250 LBS

## 2019-03-01 DIAGNOSIS — S91.001D ANKLE WOUND, RIGHT, SUBSEQUENT ENCOUNTER: ICD-10-CM

## 2019-03-01 DIAGNOSIS — Q66.70 PES CAVUS: ICD-10-CM

## 2019-03-01 DIAGNOSIS — S92.352K DISPLACED FRACTURE OF FIFTH METATARSAL BONE, LEFT FOOT, SUBSEQUENT ENCOUNTER FOR FRACTURE WITH NONUNION: Primary | ICD-10-CM

## 2019-03-01 DIAGNOSIS — E11.42 DIABETIC PERIPHERAL NEUROPATHY ASSOCIATED WITH TYPE 2 DIABETES MELLITUS (HCC): ICD-10-CM

## 2019-03-01 DIAGNOSIS — E55.9 VITAMIN D DEFICIENCY: ICD-10-CM

## 2019-03-01 DIAGNOSIS — E78.2 MIXED HYPERLIPIDEMIA: ICD-10-CM

## 2019-03-01 DIAGNOSIS — E11.42 DIABETIC POLYNEUROPATHY ASSOCIATED WITH TYPE 2 DIABETES MELLITUS (HCC): ICD-10-CM

## 2019-03-01 DIAGNOSIS — IMO0002 UNCONTROLLED TYPE 2 DIABETES MELLITUS WITH NEUROLOGIC COMPLICATION, WITH LONG-TERM CURRENT USE OF INSULIN: Primary | ICD-10-CM

## 2019-03-01 PROCEDURE — 99024 POSTOP FOLLOW-UP VISIT: CPT | Performed by: PODIATRIST

## 2019-03-01 PROCEDURE — 29580 STRAPPING UNNA BOOT: CPT | Performed by: PODIATRIST

## 2019-03-01 PROCEDURE — 99214 OFFICE O/P EST MOD 30 MIN: CPT | Performed by: NURSE PRACTITIONER

## 2019-03-01 NOTE — PROGRESS NOTES
Arianatorsten Martinez  1960  59 y.o. female   WILMAR Fong - 02/06/19  BS - 131 per patient     Patient presents today for post op recheck.    03/01/2019      Chief Complaint   Patient presents with   • Left Foot - Post-op Follow-up   • Right Ankle - Wound Check       History of Present Illness    Ms Martinez is a 58 y/o diabetic female who presents for f/u left foot revision fifth metatarsal fracture nonunion and Rich calcaneal osteotomy.  Date of surgery 1/16/2019.  She has been nonweightbearing to her left lower extremity in a below-knee fiberglass cast.  In her postoperative course she did fall on postoperative week 1 likely causing a small avulsion fracture of her calcaneal osteotomy.  She has noticed increased pain in this area over the past week.  She also previously developed a new onset ulceration to her right lateral ankle.  We placed her into an Unna boot for this issue last week which she tolerated well.    Past Medical History:   Diagnosis Date   • Angina, class IV (CMS/HCC)    • Anxiety    • Benign paroxysmal positional vertigo    • Carpal tunnel syndrome    • Chronic pain    • Coronary atherosclerosis     hx CABG 2005.  is followed by Dr Kwon   • Depression    • Diabetes mellitus (CMS/HCC)     Type 2, controlled   • Diabetic polyneuropathy (CMS/HCC)    • Elevated cholesterol    • Female stress incontinence    • Gastroparesis    • GERD (gastroesophageal reflux disease)    • Hyperlipidemia    • Hypertension    • Low back pain    • Malaise and fatigue    • Multiple joint pain    • Obesity     Refuses to be weighed   • Otalgia     Both   • Perforation of tympanic membrane     Left   • Postoperative wound infection    • Vitamin D deficiency          Past Surgical History:   Procedure Laterality Date   • ABDOMINAL SURGERY     • ANGIOPLASTY      coronary   • BREAST BIOPSY Right    • CARDIAC CATHETERIZATION     • CARDIAC CATHETERIZATION N/A 6/20/2017    Procedure: Right Heart Cath;  Surgeon: Can Quinones  MD Cher PhD;  Location: [facility] CATH INVASIVE LOCATION;  Service:    • CARPAL TUNNEL RELEASE     • CHOLECYSTECTOMY     • CORONARY ARTERY BYPASS GRAFT  2005   • ENDOSCOPY N/A 10/19/2018    Procedure: ESOPHAGOGASTRODUODENOSCOPY possible dilation;  Surgeon: Julián Maldonado MD;  Location: [facility] ENDOSCOPY;  Service: Gastroenterology   • ENDOSCOPY AND COLONOSCOPY     • FOOT SURGERY      Toes   • GASTRIC BANDING      Revision, laparoscopic   • HYSTERECTOMY     • MOUTH SURGERY     • SALPINGO OOPHORECTOMY     • SHOULDER SURGERY     • SUBTALAR ARTHRODESIS Left 1/16/2019    Procedure: LEFT FOOT HARDWARE REMOVAL, FIFTH METATARSAL , OPEN REDUCTION INTERNAL FIXATION, CALCANEAL OSTEOTOMY;  Surgeon: Ignacio Lord DPM;  Location: [facility] OR;  Service: Podiatry   • TRANSESOPHAGEAL ECHOCARDIOGRAM (LAMONTE)      With color flow         Family History   Problem Relation Age of Onset   • Diabetes Other    • Heart disease Other    • Hypertension Other    • Heart disease Mother    • Stroke Mother    • Hypertension Mother    • Diabetes Sister    • Heart disease Sister    • Hypertension Sister    • Heart disease Sister    • Diabetes Sister    • Hypertension Sister    • Diabetes Sister    • Diabetes Sister    • Diabetes Sister    • Diabetes Sister          Social History     Socioeconomic History   • Marital status:      Spouse name: Not on file   • Number of children: Not on file   • Years of education: Not on file   • Highest education level: Not on file   Social Needs   • Financial resource strain: Not on file   • Food insecurity - worry: Not on file   • Food insecurity - inability: Not on file   • Transportation needs - medical: Not on file   • Transportation needs - non-medical: Not on file   Occupational History   • Not on file   Tobacco Use   • Smoking status: Never Smoker   • Smokeless tobacco: Never Used   Substance and Sexual Activity   • Alcohol use: No   • Drug use: No   • Sexual activity: Defer   Other Topics  Concern   • Not on file   Social History Narrative   • Not on file         Current Outpatient Medications   Medication Sig Dispense Refill   • ARIPiprazole (ABILIFY) 15 MG tablet TAKE 1 TABLET BY MOUTH DAILY. 30 tablet 0   • aspirin 81 MG chewable tablet Chew 81 mg daily.     • atorvastatin (LIPITOR) 40 MG tablet Take 1 tablet by mouth Every Night. 90 tablet 1   • BD SHARPS CONTAINER HOME misc 1 each Take As Directed. 1 each 0   • Blood Glucose Monitoring Suppl (ONE TOUCH ULTRA MINI) w/Device kit USE AS DIRECTED TO CHECK BLOOD SUGAR 1 each 0   • Calcium Citrate-Vitamin D (CITRACAL/VITAMIN D) 250-200 MG-UNIT tablet Take 2 tablets by mouth 2 (two) times a day.     • clopidogrel (PLAVIX) 75 MG tablet Take 75 mg by mouth Daily.     • cyanocobalamin 1000 MCG/ML injection Inject 1 mL into the appropriate muscle as directed by prescriber Every 28 (Twenty-Eight) Days. 1 mL 0   • furosemide (LASIX) 40 MG tablet Take 1 tablet by mouth Daily. 90 tablet 1   • gabapentin (NEURONTIN) 400 MG capsule Take 400 mg by mouth 3 (Three) Times a Day.     • GLUCAGON EMERGENCY 1 MG injection USE AS DIRECTED AS NEEDED 1 kit 0   • glucose blood (ONE TOUCH ULTRA TEST) test strip 1 each by Other route 4 (Four) Times a Day. Use as instructed 400 each 1   • hydrochlorothiazide (MICROZIDE) 12.5 MG capsule Take 12.5 mg by mouth Daily.     • HYDROcodone-acetaminophen (NORCO) 7.5-325 MG per tablet Take 1 tablet by mouth Every 4 (Four) Hours As Needed for Moderate Pain . 180 tablet 0   • insulin aspart (novoLOG FLEXPEN) 100 UNIT/ML solution pen-injector sc pen Inject 60 Units under the skin into the appropriate area as directed 3 (Three) Times a Day With Meals.     • Insulin Glargine (BASAGLAR KWIKPEN) 100 UNIT/ML injection pen Inject 100 Units under the skin into the appropriate area as directed Every Night. 5 pen 5   • Insulin Pen Needle (B-D ULTRAFINE III SHORT PEN) 31G X 8 MM misc Inject 1 each into the shoulder, thigh, or buttocks 4 (Four) Times  "a Day. use as directed 150 each 11   • LORazepam (ATIVAN) 0.5 MG tablet Take 1 tablet by mouth Daily As Needed for Anxiety. 30 tablet 0   • meclizine (ANTIVERT) 25 MG tablet Take 1 tablet by mouth 3 (Three) Times a Day As Needed for dizziness. 90 tablet 6   • meloxicam (MOBIC) 15 MG tablet Take 1 tablet by mouth Daily. Take once daily. 30 tablet 0   • metoclopramide (REGLAN) 10 MG tablet Take 10 mg by mouth 4 (Four) Times a Day As Needed.     • metoprolol succinate XL (TOPROL-XL) 25 MG 24 hr tablet TAKE 1 TABLET BY MOUTH DAILY. 31 tablet 6   • mirtazapine (REMERON) 45 MG tablet TAKE 1 TABLET BY MOUTH EVERY NIGHT. 90 tablet 1   • ONE TOUCH ULTRA TEST test strip USE TO TEST SUGAR 4 TIMES A  each 3   • pantoprazole (PROTONIX) 40 MG EC tablet Take 40 mg by mouth Daily.     • SANTYL 250 UNIT/GM ointment APPLY TO AFFECTED AREA DAILY IN NICKEL INCH THICKNESS  5   • Syringe/Needle, Disp, (LUER LOCK SAFETY SYRINGES) 25G X 5/8\" 3 ML misc 1 each Daily. 100 each 0   • venlafaxine XR (EFFEXOR-XR) 150 MG 24 hr capsule Take 150 mg by mouth 2 (Two) Times a Day.     • vitamin D (ERGOCALCIFEROL) 69531 units capsule capsule Take 50,000 Units by mouth 1 (One) Time Per Week. sunday       No current facility-administered medications for this visit.      Review of Systems   Constitutional: Negative.    HENT: Negative.    Respiratory: Negative.    Musculoskeletal:        Left foot pain    Neurological: Positive for numbness.   Psychiatric/Behavioral: Negative.          OBJECTIVE    Ht 170.2 cm (67\")   Wt 113 kg (250 lb)   BMI 39.16 kg/m²         Physical Exam   Constitutional: she appears well-developed and well-nourished.   Psychiatric: she has a normal mood and affect. her   behavior is normal.      Lower Extremity Exam:  Vascular: DP pulses palpable 2+. PT not readily palpable. +signal on doppler  Negative hair growth.   Mild to moderate left foot edema  Negative Conrad  Mild ecchymosis to lateral midfoot  Neuro: Protective " sensation absent to foot, b/l. Reestablished at mid calf   DTRs intact  Vibratory sensation diminished.  Integument: Left foot incisions well healed.  No signs of infection  Right anterior shin superficial abrasions without signs of infection.  Small 0.5 cm x 0.5 cm ulceration to right lateral malleolus.  No drainage.  Predominately granular base.  Minimal surrounding erythema.  Diffuse xerosis b/l.  Webspaces c/d/i  Musculoskeletal: Left ankle range of motion intact  Tenderness to palpation of left posterior heel.  Achilles tendon intact.        Procedures        ASSESSMENT AND PLAN    Ariana was seen today for post-op follow-up and wound check.    Diagnoses and all orders for this visit:    Displaced fracture of fifth metatarsal bone, left foot, subsequent encounter for fracture with nonunion  -     XR Foot 3+ View Left    Diabetic polyneuropathy associated with type 2 diabetes mellitus (CMS/HCC)    Pes cavus    Ankle wound, right, subsequent encounter        -Patient is doing well postoperatively.  Her pain is greatly improved.  -Radiographs of the left foot and ankle ordered and reviewed.  As previously seen following her fall during postoperative week 1 she does have a avulsion of the dorsal one third of the posterior tuberosity of the calcaneus.  The hardware across the osteotomy site appears to be intact.  And there is still bone contact with the osteotomy site and fracture site.  Discussed these findings with the patient and advised to continue with  immobilization for now.  We will continue to monitor healing of the osteotomy and avulsion fracture site.  -We will begin transition to cam boot on the left and advance protective weightbearing as tolerated.  Did have brief discussion with patient over possibility of need to revise and repair of calcaneal osteotomy site if unable to progress her weightbearing or symptoms fail to improve.  -Unna boot applied to right lower extremity to protect from cast abrasion  and for edema control.  - Recheck 1 week            This document has been electronically signed by Ignacio Lord DPM on March 3, 2019 2:32 PM

## 2019-03-01 NOTE — PROGRESS NOTES
Subjective    Ariana Martinez is a 59 y.o. female. she is here today for follow-up.    History of Present Illness     Duration/Timing: Diabetes mellitus type 2, Age at onset of diabetes: 26 years years, Onset of symptoms gradual  timing - constant     qualtiy - uncontrolled     severity - moderate     patient broke left foot   -----------------------     Severity (Complications/Hospitalizations)  Secondary Macrovascular Complications: No CAD, No CVA, No PAD  Secondary Microvascular Complications: No Diabetic Nephropathy, No Diabetic Retinopathy, Diabetic Neuropathy     Context  Diabetes Regimen: Insulin, Oral Medications              Lab Results   Component Value Date     HGBA1C 8.1 (H) 11/28/2018                                 Blood Glucose Readings     Now on dexcom sensor         States good        Diet  variable carb intake  Exercise: Exercises  walking     Associated Signs/Symptoms  Hyperglycemic Symptoms: No polyuria, No polydipsia, No polyphagia, No weight gain  Hypoglycemic Episodes: Documented symptomatic hypoglycemia, Seizures and syncope related to hypoglycemia                           The following portions of the patient's history were reviewed and updated as appropriate:   Past Medical History:   Diagnosis Date   • Angina, class IV (CMS/HCC)    • Anxiety    • Benign paroxysmal positional vertigo    • Carpal tunnel syndrome    • Chronic pain    • Coronary atherosclerosis     hx CABG 2005.  is followed by Dr Kwon   • Depression    • Diabetes mellitus (CMS/HCC)     Type 2, controlled   • Diabetic polyneuropathy (CMS/HCC)    • Elevated cholesterol    • Female stress incontinence    • Gastroparesis    • GERD (gastroesophageal reflux disease)    • Hyperlipidemia    • Hypertension    • Low back pain    • Malaise and fatigue    • Multiple joint pain    • Obesity     Refuses to be weighed   • Otalgia     Both   • Perforation of tympanic membrane     Left   • Postoperative wound infection    • Vitamin  D deficiency      Past Surgical History:   Procedure Laterality Date   • ABDOMINAL SURGERY     • ANGIOPLASTY      coronary   • BREAST BIOPSY Right    • CARDIAC CATHETERIZATION     • CARDIAC CATHETERIZATION N/A 2017    Procedure: Right Heart Cath;  Surgeon: Can Kwon MD PhD;  Location: [facility] CATH INVASIVE LOCATION;  Service:    • CARPAL TUNNEL RELEASE     • CHOLECYSTECTOMY     • CORONARY ARTERY BYPASS GRAFT     • ENDOSCOPY N/A 10/19/2018    Procedure: ESOPHAGOGASTRODUODENOSCOPY possible dilation;  Surgeon: Julián Maldonado MD;  Location: [facility] ENDOSCOPY;  Service: Gastroenterology   • ENDOSCOPY AND COLONOSCOPY     • FOOT SURGERY      Toes   • GASTRIC BANDING      Revision, laparoscopic   • HYSTERECTOMY     • MOUTH SURGERY     • SALPINGO OOPHORECTOMY     • SHOULDER SURGERY     • SUBTALAR ARTHRODESIS Left 2019    Procedure: LEFT FOOT HARDWARE REMOVAL, FIFTH METATARSAL , OPEN REDUCTION INTERNAL FIXATION, CALCANEAL OSTEOTOMY;  Surgeon: Ignacio Lord DPM;  Location: [facility] OR;  Service: Podiatry   • TRANSESOPHAGEAL ECHOCARDIOGRAM (LAMONTE)      With color flow     Family History   Problem Relation Age of Onset   • Diabetes Other    • Heart disease Other    • Hypertension Other    • Heart disease Mother    • Stroke Mother    • Hypertension Mother    • Diabetes Sister    • Heart disease Sister    • Hypertension Sister    • Heart disease Sister    • Diabetes Sister    • Hypertension Sister    • Diabetes Sister    • Diabetes Sister    • Diabetes Sister    • Diabetes Sister      OB History      Para Term  AB Living    0 0 0 0 0 0    SAB TAB Ectopic Molar Multiple Live Births    0 0 0   0          Current Outpatient Medications   Medication Sig Dispense Refill   • ARIPiprazole (ABILIFY) 15 MG tablet TAKE 1 TABLET BY MOUTH DAILY. 30 tablet 0   • aspirin 81 MG chewable tablet Chew 81 mg daily.     • atorvastatin (LIPITOR) 40 MG tablet Take 1 tablet by mouth Every Night. 90 tablet 1   •  BD SHARPS CONTAINER HOME misc 1 each Take As Directed. 1 each 0   • Blood Glucose Monitoring Suppl (ONE TOUCH ULTRA MINI) w/Device kit USE AS DIRECTED TO CHECK BLOOD SUGAR 1 each 0   • Calcium Citrate-Vitamin D (CITRACAL/VITAMIN D) 250-200 MG-UNIT tablet Take 2 tablets by mouth 2 (two) times a day.     • clopidogrel (PLAVIX) 75 MG tablet Take 75 mg by mouth Daily.     • cyanocobalamin 1000 MCG/ML injection Inject 1 mL into the appropriate muscle as directed by prescriber Every 28 (Twenty-Eight) Days. 1 mL 0   • doxycycline (MONODOX) 100 MG capsule TAKE 1 CAPSULE BY MOUTH EVERY 12 HOURS FOR 10 DAYS  0   • furosemide (LASIX) 40 MG tablet Take 1 tablet by mouth Daily. 90 tablet 1   • gabapentin (NEURONTIN) 400 MG capsule Take 400 mg by mouth 3 (Three) Times a Day.     • GLUCAGON EMERGENCY 1 MG injection USE AS DIRECTED AS NEEDED 1 kit 0   • glucose blood (ONE TOUCH ULTRA TEST) test strip 1 each by Other route 4 (Four) Times a Day. Use as instructed 400 each 1   • hydrochlorothiazide (MICROZIDE) 12.5 MG capsule Take 12.5 mg by mouth Daily.     • HYDROcodone-acetaminophen (NORCO) 7.5-325 MG per tablet Take 1 tablet by mouth Every 4 (Four) Hours As Needed for Moderate Pain . 180 tablet 0   • insulin aspart (novoLOG FLEXPEN) 100 UNIT/ML solution pen-injector sc pen Inject 60 Units under the skin into the appropriate area as directed 3 (Three) Times a Day With Meals.     • Insulin Glargine (BASAGLAR KWIKPEN) 100 UNIT/ML injection pen Inject 100 Units under the skin into the appropriate area as directed Every Night. 5 pen 5   • Insulin Pen Needle (B-D ULTRAFINE III SHORT PEN) 31G X 8 MM misc Inject 1 each into the shoulder, thigh, or buttocks 4 (Four) Times a Day. use as directed 150 each 11   • LORazepam (ATIVAN) 0.5 MG tablet Take 1 tablet by mouth Daily As Needed for Anxiety. 30 tablet 0   • meclizine (ANTIVERT) 25 MG tablet Take 1 tablet by mouth 3 (Three) Times a Day As Needed for dizziness. 90 tablet 6   • meloxicam  "(MOBIC) 15 MG tablet Take 1 tablet by mouth Daily. Take once daily. 30 tablet 0   • metoclopramide (REGLAN) 10 MG tablet Take 10 mg by mouth 4 (Four) Times a Day As Needed.     • metoprolol succinate XL (TOPROL-XL) 25 MG 24 hr tablet TAKE 1 TABLET BY MOUTH DAILY. 31 tablet 6   • mirtazapine (REMERON) 45 MG tablet TAKE 1 TABLET BY MOUTH EVERY NIGHT. 90 tablet 1   • ONE TOUCH ULTRA TEST test strip USE TO TEST SUGAR 4 TIMES A  each 3   • pantoprazole (PROTONIX) 40 MG EC tablet Take 40 mg by mouth Daily.     • SANTYL 250 UNIT/GM ointment APPLY TO AFFECTED AREA DAILY IN NICKEL INCH THICKNESS  5   • Syringe/Needle, Disp, (LUER LOCK SAFETY SYRINGES) 25G X 5/8\" 3 ML misc 1 each Daily. 100 each 0   • venlafaxine XR (EFFEXOR-XR) 150 MG 24 hr capsule Take 150 mg by mouth 2 (Two) Times a Day.     • vitamin D (ERGOCALCIFEROL) 97788 units capsule capsule Take 50,000 Units by mouth 1 (One) Time Per Week. sunday       No current facility-administered medications for this visit.      Allergies   Allergen Reactions   • Seroquel [Quetiapine Fumarate] Anaphylaxis   • Avandia [Rosiglitazone] Swelling   • Morphine And Related Hallucinations   • Oxycodone Hallucinations     Social History     Socioeconomic History   • Marital status:      Spouse name: Not on file   • Number of children: Not on file   • Years of education: Not on file   • Highest education level: Not on file   Tobacco Use   • Smoking status: Never Smoker   • Smokeless tobacco: Never Used   Substance and Sexual Activity   • Alcohol use: No   • Drug use: No   • Sexual activity: Defer       Review of Systems  Review of Systems   Constitutional: Negative for activity change, appetite change, diaphoresis and fatigue.   HENT: Negative for facial swelling, sneezing, sore throat, tinnitus, trouble swallowing and voice change.    Eyes: Negative for photophobia, pain, discharge, redness, itching and visual disturbance.   Respiratory: Negative for apnea, cough, choking, " "chest tightness and shortness of breath.    Cardiovascular: Negative for chest pain, palpitations and leg swelling.   Gastrointestinal: Negative for abdominal distention, abdominal pain, constipation, diarrhea, nausea and vomiting.   Endocrine: Negative for cold intolerance, heat intolerance, polydipsia, polyphagia and polyuria.   Genitourinary: Negative for difficulty urinating, dysuria, frequency, hematuria and urgency.   Musculoskeletal: Negative for arthralgias, back pain, gait problem, joint swelling, myalgias, neck pain and neck stiffness.   Skin: Negative for color change, pallor, rash and wound.   Neurological: Negative for dizziness, tremors, weakness, light-headedness, numbness and headaches.   Hematological: Negative for adenopathy. Does not bruise/bleed easily.   Psychiatric/Behavioral: Negative for behavioral problems, confusion and sleep disturbance.        Objective    /86 (BP Location: Left arm, Patient Position: Sitting, Cuff Size: Adult)   Pulse 79   Ht 170.2 cm (67\")   Wt 113 kg (250 lb)   BMI 39.16 kg/m²   Physical Exam   Constitutional: She is oriented to person, place, and time. She appears well-developed and well-nourished. No distress.   HENT:   Head: Normocephalic and atraumatic.   Right Ear: External ear normal.   Left Ear: External ear normal.   Nose: Nose normal.   Eyes: Conjunctivae and EOM are normal. Pupils are equal, round, and reactive to light.   Neck: Normal range of motion. Neck supple. No tracheal deviation present. No thyromegaly present.   Cardiovascular: Normal rate, regular rhythm and normal heart sounds.   No murmur heard.  Pulmonary/Chest: Effort normal and breath sounds normal. No respiratory distress. She has no wheezes.   Abdominal: Soft. Bowel sounds are normal. There is no tenderness. There is no rebound and no guarding.   Musculoskeletal: Normal range of motion. She exhibits no edema, tenderness or deformity.   In wheelchair, cast on left foot , boot on " right foot   Neurological: She is alert and oriented to person, place, and time. No cranial nerve deficit.   Skin: Skin is warm and dry. No rash noted.   Psychiatric: She has a normal mood and affect. Her behavior is normal. Judgment and thought content normal.       Lab Review  Glucose (mg/dL)   Date Value   02/14/2019 151 (H)   01/16/2019 124 (H)   11/28/2018 232 (H)     Glucose, Arterial (mmol/L)   Date Value   10/14/2017 155     Sodium (mmol/L)   Date Value   02/14/2019 137   01/16/2019 138   11/28/2018 137     Potassium (mmol/L)   Date Value   02/14/2019 3.0 (L)   01/16/2019 3.2 (L)   11/28/2018 3.2 (L)     Chloride (mmol/L)   Date Value   02/14/2019 93 (L)   01/16/2019 99   11/28/2018 99     CO2 (mmol/L)   Date Value   02/14/2019 32.0 (H)   01/16/2019 30.0   11/28/2018 28.0     BUN (mg/dL)   Date Value   02/14/2019 18   01/16/2019 18   11/28/2018 14     Creatinine (mg/dL)   Date Value   02/14/2019 1.23 (H)   01/16/2019 1.11 (H)   11/28/2018 0.85     Hemoglobin A1C (%)   Date Value   11/28/2018 8.1 (H)   07/27/2018 6.4   04/16/2018 7.8 (H)   01/18/2018 8.2 (H)     Triglycerides (mg/dL)   Date Value   04/16/2018 223 (H)   01/18/2018 344 (H)   07/20/2017 389 (H)     LDL Cholesterol  (mg/dL)   Date Value   04/16/2018 108   01/18/2018 130 (H)   07/20/2017 119       Assessment/Plan      1. 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, with long-term current use of insulin (CMS/[facility])    2. Diabetic peripheral neuropathy associated with type 2 diabetes mellitus (CMS/[facility])    3. Mixed hyperlipidemia    4. Vitamin D deficiency    .    Medications prescribed:  Outpatient Encounter Medications as of 3/1/2019   Medication Sig Dispense Refill   • ARIPiprazole (ABILIFY) 15 MG tablet TAKE 1 TABLET BY MOUTH DAILY. 30 tablet 0   • aspirin 81 MG chewable tablet Chew 81 mg daily.     • atorvastatin (LIPITOR) 40 MG tablet Take 1 tablet by mouth Every Night. 90 tablet 1   • BD SHARPS CONTAINER HOME misc 1 each  Take As Directed. 1 each 0   • Blood Glucose Monitoring Suppl (ONE TOUCH ULTRA MINI) w/Device kit USE AS DIRECTED TO CHECK BLOOD SUGAR 1 each 0   • Calcium Citrate-Vitamin D (CITRACAL/VITAMIN D) 250-200 MG-UNIT tablet Take 2 tablets by mouth 2 (two) times a day.     • clopidogrel (PLAVIX) 75 MG tablet Take 75 mg by mouth Daily.     • cyanocobalamin 1000 MCG/ML injection Inject 1 mL into the appropriate muscle as directed by prescriber Every 28 (Twenty-Eight) Days. 1 mL 0   • doxycycline (MONODOX) 100 MG capsule TAKE 1 CAPSULE BY MOUTH EVERY 12 HOURS FOR 10 DAYS  0   • furosemide (LASIX) 40 MG tablet Take 1 tablet by mouth Daily. 90 tablet 1   • gabapentin (NEURONTIN) 400 MG capsule Take 400 mg by mouth 3 (Three) Times a Day.     • GLUCAGON EMERGENCY 1 MG injection USE AS DIRECTED AS NEEDED 1 kit 0   • glucose blood (ONE TOUCH ULTRA TEST) test strip 1 each by Other route 4 (Four) Times a Day. Use as instructed 400 each 1   • hydrochlorothiazide (MICROZIDE) 12.5 MG capsule Take 12.5 mg by mouth Daily.     • HYDROcodone-acetaminophen (NORCO) 7.5-325 MG per tablet Take 1 tablet by mouth Every 4 (Four) Hours As Needed for Moderate Pain . 180 tablet 0   • insulin aspart (novoLOG FLEXPEN) 100 UNIT/ML solution pen-injector sc pen Inject 60 Units under the skin into the appropriate area as directed 3 (Three) Times a Day With Meals.     • Insulin Glargine (BASAGLAR KWIKPEN) 100 UNIT/ML injection pen Inject 100 Units under the skin into the appropriate area as directed Every Night. 5 pen 5   • Insulin Pen Needle (B-D ULTRAFINE III SHORT PEN) 31G X 8 MM misc Inject 1 each into the shoulder, thigh, or buttocks 4 (Four) Times a Day. use as directed 150 each 11   • LORazepam (ATIVAN) 0.5 MG tablet Take 1 tablet by mouth Daily As Needed for Anxiety. 30 tablet 0   • meclizine (ANTIVERT) 25 MG tablet Take 1 tablet by mouth 3 (Three) Times a Day As Needed for dizziness. 90 tablet 6   • meloxicam (MOBIC) 15 MG tablet Take 1 tablet by  "mouth Daily. Take once daily. 30 tablet 0   • metoclopramide (REGLAN) 10 MG tablet Take 10 mg by mouth 4 (Four) Times a Day As Needed.     • metoprolol succinate XL (TOPROL-XL) 25 MG 24 hr tablet TAKE 1 TABLET BY MOUTH DAILY. 31 tablet 6   • mirtazapine (REMERON) 45 MG tablet TAKE 1 TABLET BY MOUTH EVERY NIGHT. 90 tablet 1   • ONE TOUCH ULTRA TEST test strip USE TO TEST SUGAR 4 TIMES A  each 3   • pantoprazole (PROTONIX) 40 MG EC tablet Take 40 mg by mouth Daily.     • SANTYL 250 UNIT/GM ointment APPLY TO AFFECTED AREA DAILY IN NICKEL INCH THICKNESS  5   • Syringe/Needle, Disp, (LUER LOCK SAFETY SYRINGES) 25G X 5/8\" 3 ML misc 1 each Daily. 100 each 0   • venlafaxine XR (EFFEXOR-XR) 150 MG 24 hr capsule Take 150 mg by mouth 2 (Two) Times a Day.     • vitamin D (ERGOCALCIFEROL) 78946 units capsule capsule Take 50,000 Units by mouth 1 (One) Time Per Week. sunday       No facility-administered encounter medications on file as of 3/1/2019.        Orders placed during this encounter include:  Orders Placed This Encounter   Procedures   • Comprehensive Metabolic Panel   • Hemoglobin A1c   • TSH   • Vitamin B12   • Vitamin D 25 Hydroxy   • CBC & Differential     Order Specific Question:   Manual Differential     Answer:   No     Glycemic Management           Lab Results   Component Value Date     HGBA1C 8.1 (H) 11/28/2018                  Dexcom sensor ---            Basaglar taking 100 units         ==================        Novolog      Taking 60 units --- increase to 65 units to 70 units      Discussed carb counting but states cannot     She will need to look at her meals because 30 units may not be enough for some meals and for others meals to strong     If you eat a meal and give 30 units and your sugar is 200 or higher before the next meal look back at that meal and the next time you eat it either give more insulin or eat less     Also if you have a low after the meal give less insulin the next time you eat " that meal         ==================     Jardiance 10 mg tablet , take before breakfast---will stop due to dizziness for possible volume depletion-----the dizziness has resolved since stopping jardiance        ==================     Metformin 500 mg tablets - caused diarrhea --stopped      ====================     start bydureon - stopped        Victoza stopped due to side effects      Warned of the gastric side effects she does have gastroparesis but she wants to try      Gave a sample she will let me know if she wants an RX called in      If vomiting or abdominal pain stop        januvia 100 mg daily - taking      ------------------------------     Lipid Management        on Lipitor   on fenofibrate                 LDL needs to be 70 or less     add zetia     Also discussed restarting the jardiance for the heart benefits but refuses     Component      Latest Ref Rng & Units 4/16/2018   Total Cholesterol      0 - 199 mg/dL 197   Triglycerides      20 - 199 mg/dL 223 (H)   HDL Cholesterol      60 - 200 mg/dL 33 (L)   LDL Cholesterol       1 - 129 mg/dL 108   LDL/HDL Ratio      0.00 - 3.22 3.62 (H)               Blood Pressure Management: Control of blood pressure  on ACEi     stopped the lasix         Microvascular Complication Monitoring: Neuropathy type sensorial     Neurontin 400 mg po TID -- moderate relief but not complete           intermittetly takes reglan for gastroparesis     states eye exam ws 2018  RX for shoes - diabetic neuropathy      Immunization - last influenza vaccine Oct. 2017         Other Diabetes Related Aspects     TSH abnormal from July to Sept 2014     TSH in the 5 to 7 range                 she refuses thyroid medicaton                      Lab Results   Component Value Date     TSH 4.650 04/16/2018                   Lab Results   Component Value Date     TSH 3.950 07/21/2018         ---     3-15     B12 low      now b12 shots     June 2015     B12- 968     Vitamin d def        Vitamin d -  26      Continue vitamin d supplement      April 2018     Vitamin d -28         Referral to GI      Reason - diarrhea chronic      History of gastroparesis- -takes reglan            4. Follow-up: Return in about 3 months (around 6/1/2019) for Recheck.

## 2019-03-07 ENCOUNTER — OFFICE VISIT (OUTPATIENT)
Dept: PODIATRY | Facility: CLINIC | Age: 59
End: 2019-03-07

## 2019-03-07 VITALS — HEIGHT: 67 IN | HEART RATE: 91 BPM | WEIGHT: 250 LBS | OXYGEN SATURATION: 99 % | BODY MASS INDEX: 39.24 KG/M2

## 2019-03-07 DIAGNOSIS — S91.001D ANKLE WOUND, RIGHT, SUBSEQUENT ENCOUNTER: ICD-10-CM

## 2019-03-07 DIAGNOSIS — E11.42 DIABETIC POLYNEUROPATHY ASSOCIATED WITH TYPE 2 DIABETES MELLITUS (HCC): ICD-10-CM

## 2019-03-07 DIAGNOSIS — S92.352K DISPLACED FRACTURE OF FIFTH METATARSAL BONE, LEFT FOOT, SUBSEQUENT ENCOUNTER FOR FRACTURE WITH NONUNION: Primary | ICD-10-CM

## 2019-03-07 DIAGNOSIS — Q66.70 PES CAVUS: ICD-10-CM

## 2019-03-07 PROCEDURE — 99024 POSTOP FOLLOW-UP VISIT: CPT | Performed by: PODIATRIST

## 2019-03-07 PROCEDURE — 29580 STRAPPING UNNA BOOT: CPT | Performed by: PODIATRIST

## 2019-03-07 RX ORDER — MELOXICAM 15 MG/1
TABLET ORAL
Qty: 30 TABLET | Refills: 1 | OUTPATIENT
Start: 2019-03-07

## 2019-03-08 DIAGNOSIS — F41.1 GENERALIZED ANXIETY DISORDER: ICD-10-CM

## 2019-03-08 RX ORDER — LORAZEPAM 0.5 MG/1
0.5 TABLET ORAL DAILY PRN
Qty: 30 TABLET | Refills: 2 | Status: SHIPPED | OUTPATIENT
Start: 2019-03-08 | End: 2019-04-29 | Stop reason: SDUPTHER

## 2019-03-08 RX ORDER — HYDROCHLOROTHIAZIDE 12.5 MG/1
CAPSULE, GELATIN COATED ORAL
Qty: 90 CAPSULE | Refills: 0 | Status: SHIPPED | OUTPATIENT
Start: 2019-03-08 | End: 2019-06-06 | Stop reason: SDUPTHER

## 2019-03-09 DIAGNOSIS — K21.9 GERD WITHOUT ESOPHAGITIS: ICD-10-CM

## 2019-03-12 RX ORDER — PANTOPRAZOLE SODIUM 40 MG/1
TABLET, DELAYED RELEASE ORAL
Qty: 90 TABLET | Refills: 2 | Status: SHIPPED | OUTPATIENT
Start: 2019-03-12 | End: 2019-03-13 | Stop reason: SDUPTHER

## 2019-03-13 DIAGNOSIS — K21.9 GERD WITHOUT ESOPHAGITIS: ICD-10-CM

## 2019-03-13 RX ORDER — PANTOPRAZOLE SODIUM 40 MG/1
40 TABLET, DELAYED RELEASE ORAL DAILY
Qty: 90 TABLET | Refills: 1 | Status: SHIPPED | OUTPATIENT
Start: 2019-03-13 | End: 2019-03-13

## 2019-03-13 RX ORDER — OMEPRAZOLE 40 MG/1
40 CAPSULE, DELAYED RELEASE ORAL DAILY
Qty: 90 CAPSULE | Refills: 1 | Status: SHIPPED | OUTPATIENT
Start: 2019-03-13 | End: 2019-12-03

## 2019-03-14 ENCOUNTER — OFFICE VISIT (OUTPATIENT)
Dept: PODIATRY | Facility: CLINIC | Age: 59
End: 2019-03-14

## 2019-03-14 VITALS — HEIGHT: 67 IN | BODY MASS INDEX: 39.24 KG/M2 | HEART RATE: 82 BPM | OXYGEN SATURATION: 99 % | WEIGHT: 250 LBS

## 2019-03-14 DIAGNOSIS — F33.1 DEPRESSION, MAJOR, RECURRENT, MODERATE (HCC): ICD-10-CM

## 2019-03-14 DIAGNOSIS — S91.001D ANKLE WOUND, RIGHT, SUBSEQUENT ENCOUNTER: ICD-10-CM

## 2019-03-14 DIAGNOSIS — S92.352K DISPLACED FRACTURE OF FIFTH METATARSAL BONE, LEFT FOOT, SUBSEQUENT ENCOUNTER FOR FRACTURE WITH NONUNION: Primary | ICD-10-CM

## 2019-03-14 DIAGNOSIS — Q66.70 PES CAVUS: ICD-10-CM

## 2019-03-14 DIAGNOSIS — E11.42 DIABETIC POLYNEUROPATHY ASSOCIATED WITH TYPE 2 DIABETES MELLITUS (HCC): ICD-10-CM

## 2019-03-14 PROCEDURE — 99024 POSTOP FOLLOW-UP VISIT: CPT | Performed by: PODIATRIST

## 2019-03-14 PROCEDURE — 29580 STRAPPING UNNA BOOT: CPT | Performed by: PODIATRIST

## 2019-03-14 RX ORDER — ARIPIPRAZOLE 15 MG/1
15 TABLET ORAL DAILY
Qty: 90 TABLET | Refills: 1 | Status: SHIPPED | OUTPATIENT
Start: 2019-03-14 | End: 2021-05-04

## 2019-03-14 RX ORDER — ARIPIPRAZOLE 15 MG/1
TABLET ORAL
Qty: 30 TABLET | Refills: 0 | Status: SHIPPED | OUTPATIENT
Start: 2019-03-14 | End: 2019-03-14 | Stop reason: SDUPTHER

## 2019-03-14 NOTE — PROGRESS NOTES
Arianajudy Martinez  1960  59 y.o. female   WILMAR Fong - 02/06/19  BS - 95 per patient     Patient presents today for post op recheck.    03/14/2019    Chief Complaint   Patient presents with   • Left Foot - Post-op Follow-up   • Right Foot - Follow-up       History of Present Illness    Ms Martinez is a 60 y/o diabetic female who presents for f/u left foot revision fifth metatarsal fracture nonunion and Rich calcaneal osteotomy.  Date of surgery 1/16/2019.    In her postoperative course she did fall on postoperative week 1 likely causing a small avulsion fracture of her calcaneal osteotomy.  We have been treating this conservatively and monitoring with serial x-rays. We did transition her into a Cam boot and allowed her to progress her weightbearing as tolerated.  She states that her pain is significantly improved today.  There is still some mild tenderness to the touch of the backside of her heel.  She reports no pain near the base of her fifth metatarsal.  We have also been treating an ulceration to her right lateral ankle with Unna boots.    Past Medical History:   Diagnosis Date   • Angina, class IV (CMS/HCC)    • Anxiety    • Benign paroxysmal positional vertigo    • Carpal tunnel syndrome    • Chronic pain    • Coronary atherosclerosis     hx CABG 2005.  is followed by Dr Kwon   • Depression    • Diabetes mellitus (CMS/HCC)     Type 2, controlled   • Diabetic polyneuropathy (CMS/HCC)    • Elevated cholesterol    • Female stress incontinence    • Gastroparesis    • GERD (gastroesophageal reflux disease)    • Hyperlipidemia    • Hypertension    • Low back pain    • Malaise and fatigue    • Multiple joint pain    • Obesity     Refuses to be weighed   • Otalgia     Both   • Perforation of tympanic membrane     Left   • Postoperative wound infection    • Vitamin D deficiency          Past Surgical History:   Procedure Laterality Date   • ABDOMINAL SURGERY     • ANGIOPLASTY      coronary   • BREAST BIOPSY  Right    • CARDIAC CATHETERIZATION     • CARDIAC CATHETERIZATION N/A 6/20/2017    Procedure: Right Heart Cath;  Surgeon: Can Kwon MD PhD;  Location: [facility] CATH INVASIVE LOCATION;  Service:    • CARPAL TUNNEL RELEASE     • CHOLECYSTECTOMY     • CORONARY ARTERY BYPASS GRAFT  2005   • ENDOSCOPY N/A 10/19/2018    Procedure: ESOPHAGOGASTRODUODENOSCOPY possible dilation;  Surgeon: Julián Maldonado MD;  Location: [facility] ENDOSCOPY;  Service: Gastroenterology   • ENDOSCOPY AND COLONOSCOPY     • FOOT SURGERY      Toes   • GASTRIC BANDING      Revision, laparoscopic   • HYSTERECTOMY     • MOUTH SURGERY     • SALPINGO OOPHORECTOMY     • SHOULDER SURGERY     • SUBTALAR ARTHRODESIS Left 1/16/2019    Procedure: LEFT FOOT HARDWARE REMOVAL, FIFTH METATARSAL , OPEN REDUCTION INTERNAL FIXATION, CALCANEAL OSTEOTOMY;  Surgeon: Ignacio Lord DPM;  Location: [facility] OR;  Service: Podiatry   • TRANSESOPHAGEAL ECHOCARDIOGRAM (LAMONTE)      With color flow         Family History   Problem Relation Age of Onset   • Diabetes Other    • Heart disease Other    • Hypertension Other    • Heart disease Mother    • Stroke Mother    • Hypertension Mother    • Diabetes Sister    • Heart disease Sister    • Hypertension Sister    • Heart disease Sister    • Diabetes Sister    • Hypertension Sister    • Diabetes Sister    • Diabetes Sister    • Diabetes Sister    • Diabetes Sister          Social History     Socioeconomic History   • Marital status:      Spouse name: Not on file   • Number of children: Not on file   • Years of education: Not on file   • Highest education level: Not on file   Social Needs   • Financial resource strain: Not on file   • Food insecurity - worry: Not on file   • Food insecurity - inability: Not on file   • Transportation needs - medical: Not on file   • Transportation needs - non-medical: Not on file   Occupational History   • Not on file   Tobacco Use   • Smoking status: Never Smoker   • Smokeless  tobacco: Never Used   Substance and Sexual Activity   • Alcohol use: No   • Drug use: No   • Sexual activity: Defer   Other Topics Concern   • Not on file   Social History Narrative   • Not on file         Current Outpatient Medications   Medication Sig Dispense Refill   • ARIPiprazole (ABILIFY) 15 MG tablet Take 1 tablet by mouth Daily. 90 tablet 1   • aspirin 81 MG chewable tablet Chew 81 mg daily.     • atorvastatin (LIPITOR) 40 MG tablet Take 1 tablet by mouth Every Night. 90 tablet 1   • BD SHARPS CONTAINER HOME misc 1 each Take As Directed. 1 each 0   • Blood Glucose Monitoring Suppl (ONE TOUCH ULTRA MINI) w/Device kit USE AS DIRECTED TO CHECK BLOOD SUGAR 1 each 0   • Calcium Citrate-Vitamin D (CITRACAL/VITAMIN D) 250-200 MG-UNIT tablet Take 2 tablets by mouth 2 (two) times a day.     • clopidogrel (PLAVIX) 75 MG tablet Take 75 mg by mouth Daily.     • cyanocobalamin 1000 MCG/ML injection Inject 1 mL into the appropriate muscle as directed by prescriber Every 28 (Twenty-Eight) Days. 1 mL 0   • furosemide (LASIX) 40 MG tablet Take 1 tablet by mouth Daily. 90 tablet 1   • gabapentin (NEURONTIN) 400 MG capsule Take 400 mg by mouth 3 (Three) Times a Day.     • GLUCAGON EMERGENCY 1 MG injection USE AS DIRECTED AS NEEDED 1 kit 0   • glucose blood (ONE TOUCH ULTRA TEST) test strip 1 each by Other route 4 (Four) Times a Day. Use as instructed 400 each 1   • hydrochlorothiazide (MICROZIDE) 12.5 MG capsule Take 12.5 mg by mouth Daily.     • hydrochlorothiazide (MICROZIDE) 12.5 MG capsule TAKE 1 CAPSULE BY MOUTH DAILY. 90 capsule 0   • HYDROcodone-acetaminophen (NORCO) 7.5-325 MG per tablet Take 1 tablet by mouth Every 4 (Four) Hours As Needed for Moderate Pain . 180 tablet 0   • insulin aspart (novoLOG FLEXPEN) 100 UNIT/ML solution pen-injector sc pen Inject 60 Units under the skin into the appropriate area as directed 3 (Three) Times a Day With Meals.     • Insulin Glargine (BASAGLAR KWIKPEN) 100 UNIT/ML injection pen  "Inject 100 Units under the skin into the appropriate area as directed Every Night. 5 pen 5   • Insulin Pen Needle (B-D ULTRAFINE III SHORT PEN) 31G X 8 MM misc Inject 1 each into the shoulder, thigh, or buttocks 4 (Four) Times a Day. use as directed 150 each 11   • LORazepam (ATIVAN) 0.5 MG tablet Take 1 tablet by mouth Daily As Needed for Anxiety. 30 tablet 2   • meclizine (ANTIVERT) 25 MG tablet Take 1 tablet by mouth 3 (Three) Times a Day As Needed for dizziness. 90 tablet 6   • meloxicam (MOBIC) 15 MG tablet Take 1 tablet by mouth Daily. Take once daily. 30 tablet 0   • metoclopramide (REGLAN) 10 MG tablet Take 10 mg by mouth 4 (Four) Times a Day As Needed.     • metoprolol succinate XL (TOPROL-XL) 25 MG 24 hr tablet TAKE 1 TABLET BY MOUTH DAILY. 31 tablet 6   • mirtazapine (REMERON) 45 MG tablet TAKE 1 TABLET BY MOUTH EVERY NIGHT. 90 tablet 1   • omeprazole (PRILOSEC) 40 MG capsule Take 1 capsule by mouth Daily. 90 capsule 1   • ONE TOUCH ULTRA TEST test strip USE TO TEST SUGAR 4 TIMES A  each 3   • SANTYL 250 UNIT/GM ointment APPLY TO AFFECTED AREA DAILY IN NICKEL INCH THICKNESS  5   • Syringe/Needle, Disp, (LUER LOCK SAFETY SYRINGES) 25G X 5/8\" 3 ML misc 1 each Daily. 100 each 0   • venlafaxine XR (EFFEXOR-XR) 150 MG 24 hr capsule Take 150 mg by mouth 2 (Two) Times a Day.     • vitamin D (ERGOCALCIFEROL) 16959 units capsule capsule Take 50,000 Units by mouth 1 (One) Time Per Week. sunday       No current facility-administered medications for this visit.      Review of Systems   Constitutional: Negative.    HENT: Negative.    Respiratory: Negative.    Musculoskeletal:        Left foot pain    Neurological: Positive for numbness.   Psychiatric/Behavioral: Negative.          OBJECTIVE    Pulse 82   Ht 170.2 cm (67.01\")   Wt 113 kg (250 lb)   SpO2 99%   BMI 39.15 kg/m²         Physical Exam   Constitutional: she appears well-developed and well-nourished.   Psychiatric: she has a normal mood and affect. " her   behavior is normal.      Lower Extremity Exam:  Vascular: DP pulses palpable 2+. PT not readily palpable. +signal on doppler  Negative hair growth.   Mild left foot edema  Negative Conrad  Mild ecchymosis to lateral midfoot  Neuro: Protective sensation absent to foot, b/l. Reestablished at mid calf   DTRs intact  Vibratory sensation diminished.  Integument: Left foot incisions well healed.  No signs of infection  No skin tenting is noted to the left heel  Small 0.3cm x 0.4 cm ulceration to right lateral malleolus.  No drainage.  Predominately granular base. No erythema.  Diffuse xerosis b/l.  Webspaces c/d/i  Musculoskeletal: Left ankle range of motion intact  Tenderness to palpation of left posterior heel.  Achilles tendon intact.         Procedures        ASSESSMENT AND PLAN    Ariana was seen today for post-op follow-up and follow-up.    Diagnoses and all orders for this visit:    Displaced fracture of fifth metatarsal bone, left foot, subsequent encounter for fracture with nonunion  -     XR Foot 3+ View Left  -     XR Calcaneus 2+ View Left    Pes cavus    Diabetic polyneuropathy associated with type 2 diabetes mellitus (CMS/[facility])    Ankle wound, right, subsequent encounter        -Patient is doing well postoperatively.  -Unna boot applied to right lower extremity  -We will continue with cam boot on the left, increase weightbearing as tolerated.  - Recheck 1 week. Unna boot change. Plan for transition to regular shoes on left in 2 weeks time            This document has been electronically signed by Ignacio Lord DPM on March 14, 2019 3:49 PM

## 2019-03-20 ENCOUNTER — OFFICE VISIT (OUTPATIENT)
Dept: PODIATRY | Facility: CLINIC | Age: 59
End: 2019-03-20

## 2019-03-20 VITALS — OXYGEN SATURATION: 99 % | HEART RATE: 78 BPM | HEIGHT: 67 IN | WEIGHT: 250 LBS | BODY MASS INDEX: 39.24 KG/M2

## 2019-03-20 DIAGNOSIS — S91.001D ANKLE WOUND, RIGHT, SUBSEQUENT ENCOUNTER: ICD-10-CM

## 2019-03-20 DIAGNOSIS — Q66.70 PES CAVUS: ICD-10-CM

## 2019-03-20 DIAGNOSIS — E11.42 DIABETIC POLYNEUROPATHY ASSOCIATED WITH TYPE 2 DIABETES MELLITUS (HCC): ICD-10-CM

## 2019-03-20 DIAGNOSIS — S92.352K DISPLACED FRACTURE OF FIFTH METATARSAL BONE, LEFT FOOT, SUBSEQUENT ENCOUNTER FOR FRACTURE WITH NONUNION: Primary | ICD-10-CM

## 2019-03-20 PROCEDURE — 99024 POSTOP FOLLOW-UP VISIT: CPT | Performed by: PODIATRIST

## 2019-03-20 PROCEDURE — 29580 STRAPPING UNNA BOOT: CPT | Performed by: PODIATRIST

## 2019-03-20 NOTE — PROGRESS NOTES
Ariana Martinez  1960  59 y.o. female   WILMAR Fong - 02/06/19  BS - 148 per patient     Patient presents today for follow up         Chief Complaint   Patient presents with   • Left Foot - Post-op Follow-up   • Right Foot - Follow-up       History of Present Illness    Ms Martinez is a 60 y/o diabetic female who presents for f/u left foot revision fifth metatarsal fracture nonunion and Rich calcaneal osteotomy.  Date of surgery 1/16/2019.  She is ambulating in a cam boot to the left lower extremity.  She is currently being treated with Unna boots for the right lower extremity ulcer.  She denies any pain or any new complaints today.    Past Medical History:   Diagnosis Date   • Angina, class IV (CMS/HCC)    • Anxiety    • Benign paroxysmal positional vertigo    • Carpal tunnel syndrome    • Chronic pain    • Coronary atherosclerosis     hx CABG 2005.  is followed by Dr Kwon   • Depression    • Diabetes mellitus (CMS/HCC)     Type 2, controlled   • Diabetic polyneuropathy (CMS/HCC)    • Elevated cholesterol    • Female stress incontinence    • Gastroparesis    • GERD (gastroesophageal reflux disease)    • Hyperlipidemia    • Hypertension    • Low back pain    • Malaise and fatigue    • Multiple joint pain    • Obesity     Refuses to be weighed   • Otalgia     Both   • Perforation of tympanic membrane     Left   • Postoperative wound infection    • Vitamin D deficiency          Past Surgical History:   Procedure Laterality Date   • ABDOMINAL SURGERY     • ANGIOPLASTY      coronary   • BREAST BIOPSY Right    • CARDIAC CATHETERIZATION     • CARDIAC CATHETERIZATION N/A 6/20/2017    Procedure: Right Heart Cath;  Surgeon: Can Kwon MD PhD;  Location: [facility] INVASIVE LOCATION;  Service:    • CARPAL TUNNEL RELEASE     • CHOLECYSTECTOMY     • CORONARY ARTERY BYPASS GRAFT  2005   • ENDOSCOPY N/A 10/19/2018    Procedure: ESOPHAGOGASTRODUODENOSCOPY possible dilation;  Surgeon: Julián Maldonado MD;   Location: [facility] ENDOSCOPY;  Service: Gastroenterology   • ENDOSCOPY AND COLONOSCOPY     • FOOT SURGERY      Toes   • GASTRIC BANDING      Revision, laparoscopic   • HYSTERECTOMY     • MOUTH SURGERY     • SALPINGO OOPHORECTOMY     • SHOULDER SURGERY     • SUBTALAR ARTHRODESIS Left 1/16/2019    Procedure: LEFT FOOT HARDWARE REMOVAL, FIFTH METATARSAL , OPEN REDUCTION INTERNAL FIXATION, CALCANEAL OSTEOTOMY;  Surgeon: Ignacio Lord DPM;  Location: [facility] OR;  Service: Podiatry   • TRANSESOPHAGEAL ECHOCARDIOGRAM (LAMONTE)      With color flow         Family History   Problem Relation Age of Onset   • Diabetes Other    • Heart disease Other    • Hypertension Other    • Heart disease Mother    • Stroke Mother    • Hypertension Mother    • Diabetes Sister    • Heart disease Sister    • Hypertension Sister    • Heart disease Sister    • Diabetes Sister    • Hypertension Sister    • Diabetes Sister    • Diabetes Sister    • Diabetes Sister    • Diabetes Sister          Social History     Socioeconomic History   • Marital status:      Spouse name: Not on file   • Number of children: Not on file   • Years of education: Not on file   • Highest education level: Not on file   Tobacco Use   • Smoking status: Never Smoker   • Smokeless tobacco: Never Used   Substance and Sexual Activity   • Alcohol use: No   • Drug use: No   • Sexual activity: Defer         Current Outpatient Medications   Medication Sig Dispense Refill   • ARIPiprazole (ABILIFY) 15 MG tablet Take 1 tablet by mouth Daily. 90 tablet 1   • aspirin 81 MG chewable tablet Chew 81 mg daily.     • atorvastatin (LIPITOR) 40 MG tablet Take 1 tablet by mouth Every Night. 90 tablet 1   • BD SHARPS CONTAINER HOME misc 1 each Take As Directed. 1 each 0   • Blood Glucose Monitoring Suppl (ONE TOUCH ULTRA MINI) w/Device kit USE AS DIRECTED TO CHECK BLOOD SUGAR 1 each 0   • Calcium Citrate-Vitamin D (CITRACAL/VITAMIN D) 250-200 MG-UNIT tablet Take 2 tablets by mouth 2  (two) times a day.     • clopidogrel (PLAVIX) 75 MG tablet Take 75 mg by mouth Daily.     • cyanocobalamin 1000 MCG/ML injection Inject 1 mL into the appropriate muscle as directed by prescriber Every 28 (Twenty-Eight) Days. 1 mL 0   • furosemide (LASIX) 40 MG tablet Take 1 tablet by mouth Daily. 90 tablet 1   • gabapentin (NEURONTIN) 400 MG capsule Take 400 mg by mouth 3 (Three) Times a Day.     • GLUCAGON EMERGENCY 1 MG injection USE AS DIRECTED AS NEEDED 1 kit 0   • glucose blood (ONE TOUCH ULTRA TEST) test strip 1 each by Other route 4 (Four) Times a Day. Use as instructed 400 each 1   • hydrochlorothiazide (MICROZIDE) 12.5 MG capsule Take 12.5 mg by mouth Daily.     • hydrochlorothiazide (MICROZIDE) 12.5 MG capsule TAKE 1 CAPSULE BY MOUTH DAILY. 90 capsule 0   • HYDROcodone-acetaminophen (NORCO) 7.5-325 MG per tablet Take 1 tablet by mouth Every 4 (Four) Hours As Needed for Moderate Pain . 180 tablet 0   • insulin aspart (novoLOG FLEXPEN) 100 UNIT/ML solution pen-injector sc pen Inject 60 Units under the skin into the appropriate area as directed 3 (Three) Times a Day With Meals.     • Insulin Glargine (BASAGLAR KWIKPEN) 100 UNIT/ML injection pen Inject 100 Units under the skin into the appropriate area as directed Every Night. 5 pen 5   • Insulin Pen Needle (B-D ULTRAFINE III SHORT PEN) 31G X 8 MM misc Inject 1 each into the shoulder, thigh, or buttocks 4 (Four) Times a Day. use as directed 150 each 11   • LORazepam (ATIVAN) 0.5 MG tablet Take 1 tablet by mouth Daily As Needed for Anxiety. 30 tablet 2   • meclizine (ANTIVERT) 25 MG tablet Take 1 tablet by mouth 3 (Three) Times a Day As Needed for dizziness. 90 tablet 6   • meloxicam (MOBIC) 15 MG tablet Take 1 tablet by mouth Daily. Take once daily. 30 tablet 0   • metoclopramide (REGLAN) 10 MG tablet Take 10 mg by mouth 4 (Four) Times a Day As Needed.     • metoprolol succinate XL (TOPROL-XL) 25 MG 24 hr tablet TAKE 1 TABLET BY MOUTH DAILY. 31 tablet 6   •  "mirtazapine (REMERON) 45 MG tablet TAKE 1 TABLET BY MOUTH EVERY NIGHT. 90 tablet 1   • omeprazole (PRILOSEC) 40 MG capsule Take 1 capsule by mouth Daily. 90 capsule 1   • ONE TOUCH ULTRA TEST test strip USE TO TEST SUGAR 4 TIMES A  each 3   • SANTYL 250 UNIT/GM ointment APPLY TO AFFECTED AREA DAILY IN NICKEL INCH THICKNESS  5   • Syringe/Needle, Disp, (LUER LOCK SAFETY SYRINGES) 25G X 5/8\" 3 ML misc 1 each Daily. 100 each 0   • venlafaxine XR (EFFEXOR-XR) 150 MG 24 hr capsule Take 150 mg by mouth 2 (Two) Times a Day.     • vitamin D (ERGOCALCIFEROL) 86786 units capsule capsule Take 50,000 Units by mouth 1 (One) Time Per Week. sunday       No current facility-administered medications for this visit.      Review of Systems   Constitutional: Negative.    HENT: Negative.    Respiratory: Negative.    Musculoskeletal:        Left foot pain    Neurological: Positive for numbness.   Psychiatric/Behavioral: Negative.          OBJECTIVE    Pulse 78   Ht 170.2 cm (67.01\")   Wt 113 kg (250 lb)   SpO2 99%   BMI 39.15 kg/m²         Physical Exam   Constitutional: she appears well-developed and well-nourished.   Psychiatric: she has a normal mood and affect. her   behavior is normal.      Lower Extremity Exam:  Vascular: DP pulses palpable 2+. PT not readily palpable. +signal on doppler  Negative hair growth.   Mild left foot edema  Negative Conrad  Mild ecchymosis to lateral midfoot  Neuro: Protective sensation absent to foot, b/l. Reestablished at mid calf   DTRs intact  Vibratory sensation diminished.  Integument: Left foot incisions well healed.  No signs of infection  No skin tenting is noted to the left heel  Small 0.1cm x 0.1 cm ulceration to right lateral malleolus.  No drainage.  Predominately granular base. No erythema.  Diffuse xerosis b/l.  Webspaces c/d/i  Musculoskeletal: Left ankle range of motion intact  Tenderness to palpation of left posterior heel.  Achilles tendon intact. "         Procedures        ASSESSMENT AND PLAN    Ariana was seen today for post-op follow-up and follow-up.    Diagnoses and all orders for this visit:    Displaced fracture of fifth metatarsal bone, left foot, subsequent encounter for fracture with nonunion    Pes cavus    Diabetic polyneuropathy associated with type 2 diabetes mellitus (CMS/HCC)    Ankle wound, right, subsequent encounter        -Patient is doing well postoperatively.  -Unna boot applied to right lower extremity  -Weightbearing as tolerated in cam boot to left lower extremity  - Recheck 1 week.  With Dr. Lord          This document has been electronically signed by Kaleb Li DPM on March 20, 2019 1:06 PM

## 2019-03-22 DIAGNOSIS — E11.42 DIABETIC PERIPHERAL NEUROPATHY ASSOCIATED WITH TYPE 2 DIABETES MELLITUS (HCC): ICD-10-CM

## 2019-03-25 RX ORDER — GABAPENTIN 400 MG/1
CAPSULE ORAL
Qty: 90 CAPSULE | Refills: 2 | OUTPATIENT
Start: 2019-03-25

## 2019-03-27 RX ORDER — HYDROCODONE BITARTRATE AND ACETAMINOPHEN 7.5; 325 MG/1; MG/1
1 TABLET ORAL EVERY 4 HOURS PRN
Qty: 180 TABLET | Refills: 0 | Status: SHIPPED | OUTPATIENT
Start: 2019-03-27 | End: 2019-04-29 | Stop reason: SDUPTHER

## 2019-03-28 ENCOUNTER — OFFICE VISIT (OUTPATIENT)
Dept: PODIATRY | Facility: CLINIC | Age: 59
End: 2019-03-28

## 2019-03-28 VITALS — OXYGEN SATURATION: 94 % | HEIGHT: 67 IN | HEART RATE: 84 BPM | WEIGHT: 250 LBS | BODY MASS INDEX: 39.24 KG/M2

## 2019-03-28 DIAGNOSIS — M79.672 LEFT FOOT PAIN: ICD-10-CM

## 2019-03-28 DIAGNOSIS — Q66.70 PES CAVUS: ICD-10-CM

## 2019-03-28 DIAGNOSIS — E11.42 DIABETIC POLYNEUROPATHY ASSOCIATED WITH TYPE 2 DIABETES MELLITUS (HCC): ICD-10-CM

## 2019-03-28 DIAGNOSIS — S92.352K DISPLACED FRACTURE OF FIFTH METATARSAL BONE, LEFT FOOT, SUBSEQUENT ENCOUNTER FOR FRACTURE WITH NONUNION: Primary | ICD-10-CM

## 2019-03-28 PROCEDURE — 99024 POSTOP FOLLOW-UP VISIT: CPT | Performed by: PODIATRIST

## 2019-04-06 DIAGNOSIS — E11.42 DIABETIC PERIPHERAL NEUROPATHY ASSOCIATED WITH TYPE 2 DIABETES MELLITUS (HCC): ICD-10-CM

## 2019-04-08 RX ORDER — GABAPENTIN 400 MG/1
CAPSULE ORAL
Qty: 90 CAPSULE | Refills: 2 | Status: SHIPPED | OUTPATIENT
Start: 2019-04-08 | End: 2019-08-22

## 2019-04-08 RX ORDER — ONDANSETRON HYDROCHLORIDE 8 MG/1
8 TABLET, FILM COATED ORAL EVERY 8 HOURS
Qty: 90 TABLET | Refills: 0 | OUTPATIENT
Start: 2019-04-08

## 2019-04-11 ENCOUNTER — OFFICE VISIT (OUTPATIENT)
Dept: PODIATRY | Facility: CLINIC | Age: 59
End: 2019-04-11

## 2019-04-11 VITALS — WEIGHT: 264 LBS | BODY MASS INDEX: 40.01 KG/M2 | HEART RATE: 87 BPM | HEIGHT: 68 IN | OXYGEN SATURATION: 98 %

## 2019-04-11 DIAGNOSIS — E11.42 DIABETIC POLYNEUROPATHY ASSOCIATED WITH TYPE 2 DIABETES MELLITUS (HCC): ICD-10-CM

## 2019-04-11 DIAGNOSIS — R26.81 GAIT INSTABILITY: Primary | ICD-10-CM

## 2019-04-11 DIAGNOSIS — Q66.70 PES CAVUS: ICD-10-CM

## 2019-04-11 DIAGNOSIS — M79.672 LEFT FOOT PAIN: ICD-10-CM

## 2019-04-11 PROCEDURE — 99024 POSTOP FOLLOW-UP VISIT: CPT | Performed by: PODIATRIST

## 2019-04-11 NOTE — PROGRESS NOTES
Ariana Martinez  1960  59 y.o. female   WILMAR Fong - 02/06/19  BS - 119 per patient     Patient presents today for follow up     04/11/2019    Chief Complaint   Patient presents with   • Left Foot - Follow-up, Post-op   • Right Foot - Follow-up       History of Present Illness    Ms Martinez is a 58 y/o diabetic female who presents for f/u left foot revision fifth metatarsal fracture nonunion and Rich calcaneal osteotomy.  Date of surgery 1/16/2019.  She is ambulating in a cam boot to the left lower extremity. In her postoperative course she did fall on postoperative week 1 likely causing a small avulsion fracture of her calcaneal osteotomy.  We have been treating this conservatively and monitoring with serial x-rays.  She has returned to regular shoes full-time since last visit.  She denies any pain today but does note being relatively unsteady on her feet.    Past Medical History:   Diagnosis Date   • Angina, class IV (CMS/HCC)    • Anxiety    • Benign paroxysmal positional vertigo    • Carpal tunnel syndrome    • Chronic pain    • Coronary atherosclerosis     hx CABG 2005.  is followed by Dr Kwon   • Depression    • Diabetes mellitus (CMS/HCC)     Type 2, controlled   • Diabetic polyneuropathy (CMS/HCC)    • Elevated cholesterol    • Female stress incontinence    • Gastroparesis    • GERD (gastroesophageal reflux disease)    • Hyperlipidemia    • Hypertension    • Low back pain    • Malaise and fatigue    • Multiple joint pain    • Obesity     Refuses to be weighed   • Otalgia     Both   • Perforation of tympanic membrane     Left   • Postoperative wound infection    • Vitamin D deficiency          Past Surgical History:   Procedure Laterality Date   • ABDOMINAL SURGERY     • ANGIOPLASTY      coronary   • BREAST BIOPSY Right    • CARDIAC CATHETERIZATION     • CARDIAC CATHETERIZATION N/A 6/20/2017    Procedure: Right Heart Cath;  Surgeon: Can Kwon MD PhD;  Location: [facility] INVASIVE  LOCATION;  Service:    • CARPAL TUNNEL RELEASE     • CHOLECYSTECTOMY     • CORONARY ARTERY BYPASS GRAFT  2005   • ENDOSCOPY N/A 10/19/2018    Procedure: ESOPHAGOGASTRODUODENOSCOPY possible dilation;  Surgeon: Julián Maldonado MD;  Location: [facility] ENDOSCOPY;  Service: Gastroenterology   • ENDOSCOPY AND COLONOSCOPY     • FOOT SURGERY      Toes   • GASTRIC BANDING      Revision, laparoscopic   • HYSTERECTOMY     • MOUTH SURGERY     • SALPINGO OOPHORECTOMY     • SHOULDER SURGERY     • SUBTALAR ARTHRODESIS Left 1/16/2019    Procedure: LEFT FOOT HARDWARE REMOVAL, FIFTH METATARSAL , OPEN REDUCTION INTERNAL FIXATION, CALCANEAL OSTEOTOMY;  Surgeon: Ignacio Lord DPM;  Location: [facility] OR;  Service: Podiatry   • TRANSESOPHAGEAL ECHOCARDIOGRAM (LAMONTE)      With color flow         Family History   Problem Relation Age of Onset   • Diabetes Other    • Heart disease Other    • Hypertension Other    • Heart disease Mother    • Stroke Mother    • Hypertension Mother    • Diabetes Sister    • Heart disease Sister    • Hypertension Sister    • Heart disease Sister    • Diabetes Sister    • Hypertension Sister    • Diabetes Sister    • Diabetes Sister    • Diabetes Sister    • Diabetes Sister          Social History     Socioeconomic History   • Marital status:      Spouse name: Not on file   • Number of children: Not on file   • Years of education: Not on file   • Highest education level: Not on file   Tobacco Use   • Smoking status: Never Smoker   • Smokeless tobacco: Never Used   Substance and Sexual Activity   • Alcohol use: No   • Drug use: No   • Sexual activity: Defer         Current Outpatient Medications   Medication Sig Dispense Refill   • ARIPiprazole (ABILIFY) 15 MG tablet Take 1 tablet by mouth Daily. 90 tablet 1   • aspirin 81 MG chewable tablet Chew 81 mg daily.     • atorvastatin (LIPITOR) 40 MG tablet Take 1 tablet by mouth Every Night. 90 tablet 1   • BD SHARPS CONTAINER HOME misc 1 each Take As Directed.  1 each 0   • Blood Glucose Monitoring Suppl (ONE TOUCH ULTRA MINI) w/Device kit USE AS DIRECTED TO CHECK BLOOD SUGAR 1 each 0   • Calcium Citrate-Vitamin D (CITRACAL/VITAMIN D) 250-200 MG-UNIT tablet Take 2 tablets by mouth 2 (two) times a day.     • ciprofloxacin (CIPRO) 250 MG tablet Take 1 tablet by mouth 2 (Two) Times a Day. 10 tablet 0   • clopidogrel (PLAVIX) 75 MG tablet Take 75 mg by mouth Daily.     • cyanocobalamin 1000 MCG/ML injection Inject 1 mL into the appropriate muscle as directed by prescriber Every 28 (Twenty-Eight) Days. 1 mL 0   • furosemide (LASIX) 40 MG tablet Take 1 tablet by mouth Daily. 90 tablet 1   • gabapentin (NEURONTIN) 400 MG capsule Take 400 mg by mouth 3 (Three) Times a Day.     • gabapentin (NEURONTIN) 400 MG capsule TAKE 1 CAPSULE BY MOUTH THREE TIMES A DAY 90 capsule 2   • GLUCAGON EMERGENCY 1 MG injection USE AS DIRECTED AS NEEDED 1 kit 0   • glucose blood (ONE TOUCH ULTRA TEST) test strip 1 each by Other route 4 (Four) Times a Day. Use as instructed 400 each 1   • hydrochlorothiazide (MICROZIDE) 12.5 MG capsule Take 12.5 mg by mouth Daily.     • hydrochlorothiazide (MICROZIDE) 12.5 MG capsule TAKE 1 CAPSULE BY MOUTH DAILY. 90 capsule 0   • HYDROcodone-acetaminophen (NORCO) 7.5-325 MG per tablet Take 1 tablet by mouth Every 4 (Four) Hours As Needed for Moderate Pain . 180 tablet 0   • insulin aspart (novoLOG FLEXPEN) 100 UNIT/ML solution pen-injector sc pen Inject 60 Units under the skin into the appropriate area as directed 3 (Three) Times a Day With Meals.     • Insulin Glargine (BASAGLAR KWIKPEN) 100 UNIT/ML injection pen Inject 100 Units under the skin into the appropriate area as directed Every Night. 5 pen 5   • Insulin Pen Needle (B-D ULTRAFINE III SHORT PEN) 31G X 8 MM misc Inject 1 each into the shoulder, thigh, or buttocks 4 (Four) Times a Day. use as directed 150 each 11   • LORazepam (ATIVAN) 0.5 MG tablet Take 1 tablet by mouth Daily As Needed for Anxiety. 30 tablet  "2   • meclizine (ANTIVERT) 25 MG tablet Take 1 tablet by mouth 3 (Three) Times a Day As Needed for dizziness. 90 tablet 6   • meloxicam (MOBIC) 15 MG tablet Take 1 tablet by mouth Daily. Take once daily. 30 tablet 0   • metoclopramide (REGLAN) 10 MG tablet Take 10 mg by mouth 4 (Four) Times a Day As Needed.     • metoprolol succinate XL (TOPROL-XL) 25 MG 24 hr tablet TAKE 1 TABLET BY MOUTH DAILY. 31 tablet 6   • mirtazapine (REMERON) 45 MG tablet TAKE 1 TABLET BY MOUTH EVERY NIGHT. 90 tablet 1   • omeprazole (PRILOSEC) 40 MG capsule Take 1 capsule by mouth Daily. 90 capsule 1   • ONE TOUCH ULTRA TEST test strip USE TO TEST SUGAR 4 TIMES A  each 3   • phenazopyridine (PYRIDIUM) 200 MG tablet Take 1 tablet by mouth 3 (Three) Times a Day As Needed for bladder spasms. 21 tablet 0   • SANTYL 250 UNIT/GM ointment APPLY TO AFFECTED AREA DAILY IN NICKEL INCH THICKNESS  5   • Syringe/Needle, Disp, (LUER LOCK SAFETY SYRINGES) 25G X 5/8\" 3 ML misc 1 each Daily. 100 each 0   • venlafaxine XR (EFFEXOR-XR) 150 MG 24 hr capsule Take 150 mg by mouth 2 (Two) Times a Day.     • vitamin D (ERGOCALCIFEROL) 69135 units capsule capsule Take 50,000 Units by mouth 1 (One) Time Per Week. sunday     • insulin aspart (NOVOLOG FLEXPEN) 100 UNIT/ML solution pen-injector sc pen INJECT 60 UNITS BEFORE MEALS 3 TIMES A DAY 5 pen 5   • vitamin D (ERGOCALCIFEROL) 60154 units capsule capsule TAKE ONE CAPSULE BY MOUTH EVERY SUNDAY 12 capsule 2     No current facility-administered medications for this visit.      Review of Systems   Constitutional: Negative.    HENT: Negative.    Respiratory: Negative.    Musculoskeletal:        Left foot pain    Neurological: Positive for numbness.   Psychiatric/Behavioral: Negative.          OBJECTIVE    Pulse 87   Ht 172.7 cm (68\")   Wt 120 kg (264 lb)   SpO2 98%   BMI 40.14 kg/m²         Physical Exam   Constitutional: she appears well-developed and well-nourished.   Psychiatric: she has a normal mood and " affect. her   behavior is normal.      Lower Extremity Exam:  Vascular: DP pulses palpable 2+. PT not readily palpable. +signal on doppler  Negative hair growth.   Mild left foot edema  Negative Conrad  Mild ecchymosis to lateral midfoot  Neuro: Protective sensation absent to foot, b/l. Reestablished at mid calf   DTRs intact  Vibratory sensation diminished.  Integument: Left foot incisions well healed.  No signs of infection  No skin tenting is noted to the left heel  Right lateral malleolus ulceration healed  Diffuse xerosis b/l.  Webspaces c/d/i  Musculoskeletal: Left ankle range of motion intact  Minimal tenderness to palpation of left posterior heel.  Achilles tendon intact.         Procedures        ASSESSMENT AND PLAN    Ariana was seen today for follow-up, post-op and follow-up.    Diagnoses and all orders for this visit:    Gait instability  -     Ambulatory Referral to Physical Therapy POST OP, Ortho, Evaluate and treat; Stretching, ROM, Strengthening; Full weight bearing    Left foot pain  -     XR Foot 3+ View Left    Pes cavus  -     Ambulatory Referral to Physical Therapy POST OP, Ortho, Evaluate and treat; Stretching, ROM, Strengthening; Full weight bearing    Diabetic polyneuropathy associated with type 2 diabetes mellitus (CMS/HCC)        -Patient is doing well significantly improved pain.  -Ready graphs ordered and reviewed.  No significant interval changes of calcaneal osteotomy/fracture.  -Will refer to physical therapy for strengthening and gait training  -Recheck 4 weeks          This document has been electronically signed by Ignacio Lord DPM on April 20, 2019 2:23 PM

## 2019-04-17 RX ORDER — ERGOCALCIFEROL 1.25 MG/1
CAPSULE ORAL
Qty: 12 CAPSULE | Refills: 2 | Status: SHIPPED | OUTPATIENT
Start: 2019-04-17 | End: 2019-07-13 | Stop reason: HOSPADM

## 2019-04-23 ENCOUNTER — APPOINTMENT (OUTPATIENT)
Dept: PHYSICAL THERAPY | Facility: HOSPITAL | Age: 59
End: 2019-04-23

## 2019-04-29 ENCOUNTER — OFFICE VISIT (OUTPATIENT)
Dept: FAMILY MEDICINE CLINIC | Facility: CLINIC | Age: 59
End: 2019-04-29

## 2019-04-29 VITALS
TEMPERATURE: 97.6 F | HEART RATE: 75 BPM | SYSTOLIC BLOOD PRESSURE: 122 MMHG | BODY MASS INDEX: 40.14 KG/M2 | RESPIRATION RATE: 16 BRPM | DIASTOLIC BLOOD PRESSURE: 70 MMHG | OXYGEN SATURATION: 98 % | HEIGHT: 68 IN

## 2019-04-29 DIAGNOSIS — M54.41 CHRONIC RIGHT-SIDED LOW BACK PAIN WITH RIGHT-SIDED SCIATICA: Chronic | ICD-10-CM

## 2019-04-29 DIAGNOSIS — F41.1 GENERALIZED ANXIETY DISORDER: ICD-10-CM

## 2019-04-29 DIAGNOSIS — E11.42 DIABETIC PERIPHERAL NEUROPATHY ASSOCIATED WITH TYPE 2 DIABETES MELLITUS (HCC): Primary | ICD-10-CM

## 2019-04-29 DIAGNOSIS — G89.29 CHRONIC RIGHT-SIDED LOW BACK PAIN WITH RIGHT-SIDED SCIATICA: Chronic | ICD-10-CM

## 2019-04-29 DIAGNOSIS — IMO0002 UNCONTROLLED TYPE 2 DIABETES MELLITUS WITH NEUROLOGIC COMPLICATION, WITH LONG-TERM CURRENT USE OF INSULIN: ICD-10-CM

## 2019-04-29 DIAGNOSIS — F41.1 GAD (GENERALIZED ANXIETY DISORDER): ICD-10-CM

## 2019-04-29 PROCEDURE — 99214 OFFICE O/P EST MOD 30 MIN: CPT | Performed by: NURSE PRACTITIONER

## 2019-04-29 RX ORDER — HYDROCODONE BITARTRATE AND ACETAMINOPHEN 7.5; 325 MG/1; MG/1
1 TABLET ORAL EVERY 4 HOURS PRN
Qty: 180 TABLET | Refills: 0 | Status: SHIPPED | OUTPATIENT
Start: 2019-04-29 | End: 2019-05-23 | Stop reason: SDUPTHER

## 2019-04-29 RX ORDER — LORAZEPAM 0.5 MG/1
0.5 TABLET ORAL DAILY PRN
Qty: 30 TABLET | Refills: 0 | Status: SHIPPED | OUTPATIENT
Start: 2019-04-29 | End: 2019-07-25 | Stop reason: SDUPTHER

## 2019-04-29 RX ORDER — GABAPENTIN 400 MG/1
400 CAPSULE ORAL 3 TIMES DAILY
Qty: 90 CAPSULE | Refills: 0 | Status: SHIPPED | OUTPATIENT
Start: 2019-04-29 | End: 2019-07-13 | Stop reason: HOSPADM

## 2019-04-29 NOTE — PROGRESS NOTES
Chief Complaint   Patient presents with   • Neurologic Problem     med refills Hetal pt     Subjective   Ariana Martinez is a 59 y.o. female who presents to the office for chronic pain management for lower back pain and diabetic polyneuropathy requiring opiates and gabapentin. Also for chronic anxiety management requiring benzodiazepines. She is a primary care patient of Dr Fong. She intends to transfer here in July after Dr Fong leaves our practice, but staying with her for now.    The following portions of the patient's history were reviewed and updated as appropriate: allergies, current medications, past family history, past medical history, past social history, past surgical history and problem list.    History of Present Illness   Diabetes - managed with insulin  Followed by endocrinology  Average readings of FBG levels are 100-140  Currently prescribed novolog, tresiba  Has previously failed metformin, bydureon, jardiance, januvia  Diabetic eye exam  current performed by Dr England     Left foot revision fifth metatarsal fracture nonunion and cazares calcaneal osteotomy  Surgery was performed 1/16/19 with Dr Lord  Follow up was just performed with Dr Li 4/1/19    Chronic lower back pain:onset over 5years ago.  right lower back over hip radiates down right buttock. She has a pain stimulator implanted for this as well. This is a constant aching pain, stabbing at times.Exacerbated with any movement. Managed well with hydrocodone, not a candidate for NSAIDS due to anticoagulation with Plavix. Gradual worsening over the years.     Diabetic polyneuropathy bilateral lower extremities: managed with gabapentin for over a year.      Last uds 2/6/19 appropriate for hydrocodone and Ativan  Due for urine gabapentin   microalbuminuria 2/6/19     Shingles second dose done [facility]     No new problems today.            Past Medical History:   Diagnosis Date   • Angina, class IV (CMS/HCC)    • Anxiety    • Benign  paroxysmal positional vertigo    • Carpal tunnel syndrome    • Chronic pain    • Coronary atherosclerosis     hx CABG 2005.  is followed by Dr Kwon   • Depression    • Diabetes mellitus (CMS/HCC)     Type 2, controlled   • Diabetic polyneuropathy (CMS/HCC)    • Elevated cholesterol    • Female stress incontinence    • Gastroparesis    • GERD (gastroesophageal reflux disease)    • Hyperlipidemia    • Hypertension    • Low back pain    • Malaise and fatigue    • Multiple joint pain    • Obesity     Refuses to be weighed   • Otalgia     Both   • Perforation of tympanic membrane     Left   • Postoperative wound infection    • Vitamin D deficiency           Family History   Problem Relation Age of Onset   • Diabetes Other    • Heart disease Other    • Hypertension Other    • Heart disease Mother    • Stroke Mother    • Hypertension Mother    • Diabetes Sister    • Heart disease Sister    • Hypertension Sister    • Heart disease Sister    • Diabetes Sister    • Hypertension Sister    • Diabetes Sister    • Diabetes Sister    • Diabetes Sister    • Diabetes Sister         Review of Systems   Constitutional: Negative for appetite change, chills, fatigue and fever.   HENT: Negative for congestion, ear pain, rhinorrhea and sore throat.    Eyes: Negative for pain.   Respiratory: Negative for cough and shortness of breath.    Cardiovascular: Negative for chest pain and palpitations.   Gastrointestinal: Negative for abdominal pain, constipation, diarrhea and nausea.   Genitourinary: Negative for dysuria.   Musculoskeletal: Positive for arthralgias and back pain. Negative for joint swelling and neck pain.   Skin: Negative for rash.   Neurological: Positive for numbness (BLE, chronic polyneuropathy includes feet.). Negative for dizziness and headaches.   Psychiatric/Behavioral: The patient is nervous/anxious.    All other systems reviewed and are negative.      Objective   Vitals:    04/29/19 0829   BP: 122/70   BP Location:  "Left arm   Patient Position: Sitting   Cuff Size: Adult   Pulse: 75   Resp: 16   Temp: 97.6 °F (36.4 °C)   TempSrc: Oral   SpO2: 98%   Weight: Comment: refused   Height: 172.7 cm (68\")   PainSc:   6   PainLoc: Back     Physical Exam   Constitutional: She is oriented to person, place, and time. She appears well-developed and well-nourished.   HENT:   Head: Normocephalic and atraumatic.   Eyes: Pupils are equal, round, and reactive to light.   Neck: Normal range of motion. Neck supple.   Cardiovascular: Normal rate, regular rhythm and normal heart sounds.   Pulmonary/Chest: Effort normal and breath sounds normal. No respiratory distress. She has no wheezes. She has no rales.   Abdominal: Soft. Bowel sounds are normal.   Musculoskeletal: She exhibits tenderness (left foot, right lower back). She exhibits no edema.        Back:    Left foot casted  Examined in wheelchair  Motor and sensory intact   Neurological: She is alert and oriented to person, place, and time.   Skin: Skin is warm and dry. Capillary refill takes 2 to 3 seconds.   Psychiatric: She has a normal mood and affect.   Nursing note and vitals reviewed.      Assessment/Plan   Ariana was seen today for neurologic problem.    Diagnoses and all orders for this visit:    Diabetic peripheral neuropathy associated with type 2 diabetes mellitus (CMS/HCC)  -     gabapentin (NEURONTIN) 400 MG capsule; Take 1 capsule by mouth 3 (Three) Times a Day. Fill when due    Generalized anxiety disorder  -     LORazepam (ATIVAN) 0.5 MG tablet; Take 1 tablet by mouth Daily As Needed for Anxiety. Fill when due    MAURICIO (generalized anxiety disorder)    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, with long-term current use of insulin (CMS/HCC)    Chronic right-sided low back pain with right-sided sciatica  -     HYDROcodone-acetaminophen (NORCO) 7.5-325 MG per tablet; Take 1 tablet by mouth Every 4 (Four) Hours As Needed for Moderate Pain  or Severe Pain . " Up to 5 times per day- fill when due           PHQ-2/PHQ-9 Depression Screening 4/29/2019   Little interest or pleasure in doing things 0   Feeling down, depressed, or hopeless 0   Total Score 0       Crystal L Ferguson, APRN         Return in about 4 weeks (around 5/27/2019).    There are no Patient Instructions on file for this visit.    Admission on 03/30/2019, Discharged on 03/30/2019   Component Date Value Ref Range Status   • Color 03/30/2019 Yellow  Yellow, Straw, Dark Yellow, Kristel Final   • Clarity, UA 03/30/2019 Turbid* Clear Final   • Glucose, UA 03/30/2019 Negative  Negative, 1000 mg/dL (3+) mg/dL Final   • Bilirubin 03/30/2019 Negative  Negative Final   • Ketones, UA 03/30/2019 Negative  Negative Final   • Specific Gravity  03/30/2019 1.015  1.005 - 1.030 Final   • Blood, UA 03/30/2019 3+* Negative Final   • pH, Urine 03/30/2019 6.0  5.0 - 8.0 Final   • Protein, POC 03/30/2019 Trace* Negative mg/dL Final   • Urobilinogen, UA 03/30/2019 Normal  Normal Final   • Nitrite, UA 03/30/2019 Negative  Negative Final   • Leukocytes 03/30/2019 Large (3+)* Negative Final   • WBC, UA 03/30/2019 None Seen  None Seen /HPF Final   • RBC, UA 03/30/2019 None Seen  None Seen /HPF Final   • Bacteria, UA 03/30/2019 None Seen  None Seen /HPF Final   Lab Requisition on 02/14/2019   Component Date Value Ref Range Status   • Glucose 02/14/2019 151* 60 - 100 mg/dL Final   • BUN 02/14/2019 18  7 - 21 mg/dL Final   • Creatinine 02/14/2019 1.23* 0.50 - 1.00 mg/dL Final   • Sodium 02/14/2019 137  137 - 145 mmol/L Final   • Potassium 02/14/2019 3.0* 3.5 - 5.1 mmol/L Final   • Chloride 02/14/2019 93* 95 - 110 mmol/L Final   • CO2 02/14/2019 32.0* 22.0 - 31.0 mmol/L Final   • Calcium 02/14/2019 10.0  8.4 - 10.2 mg/dL Final   • Total Protein 02/14/2019 8.1  6.3 - 8.6 g/dL Final   • Albumin 02/14/2019 4.50  3.40 - 4.80 g/dL Final   • ALT (SGPT) 02/14/2019 29  9 - 52 U/L Final   • AST (SGOT) 02/14/2019 36  14 - 36 U/L Final   • Alkaline  Phosphatase 02/14/2019 169* 38 - 126 U/L Final   • Total Bilirubin 02/14/2019 0.3  0.2 - 1.3 mg/dL Final   • eGFR Non African Amer 02/14/2019 45* 51 - 120 mL/min/1.73 Final   • Globulin 02/14/2019 3.6* 2.3 - 3.5 gm/dL Final   • A/G Ratio 02/14/2019 1.3  1.1 - 1.8 g/dL Final   • BUN/Creatinine Ratio 02/14/2019 14.6  7.0 - 25.0 Final   • Anion Gap 02/14/2019 12.0  5.0 - 15.0 mmol/L Final   • WBC 02/14/2019 15.03* 3.40 - 10.80 10*3/mm3 Final   • RBC 02/14/2019 5.49* 3.77 - 5.28 10*6/mm3 Final   • Hemoglobin 02/14/2019 14.8  12.0 - 15.9 g/dL Final   • Hematocrit 02/14/2019 45.0  34.0 - 46.6 % Final   • MCV 02/14/2019 82.0  79.0 - 97.0 fL Final   • MCH 02/14/2019 27.0  26.6 - 33.0 pg Final   • MCHC 02/14/2019 32.9  31.5 - 35.7 g/dL Final   • RDW 02/14/2019 14.0  12.3 - 15.4 % Final   • RDW-SD 02/14/2019 41.3  37.0 - 54.0 fl Final   • MPV 02/14/2019 9.8  6.0 - 12.0 fL Final   • Platelets 02/14/2019 459* 140 - 450 10*3/mm3 Final   • Neutrophil % 02/14/2019 62.0  42.7 - 76.0 % Final   • Lymphocyte % 02/14/2019 26.9  19.6 - 45.3 % Final   • Monocyte % 02/14/2019 7.0  5.0 - 12.0 % Final   • Eosinophil % 02/14/2019 3.2  0.3 - 6.2 % Final   • Basophil % 02/14/2019 0.6  0.0 - 1.5 % Final   • Immature Grans % 02/14/2019 0.3  0.0 - 0.5 % Final   • Neutrophils, Absolute 02/14/2019 9.32* 1.40 - 7.00 10*3/mm3 Final   • Lymphocytes, Absolute 02/14/2019 4.04* 0.70 - 3.10 10*3/mm3 Final   • Monocytes, Absolute 02/14/2019 1.05* 0.10 - 0.90 10*3/mm3 Final   • Eosinophils, Absolute 02/14/2019 0.48* 0.00 - 0.40 10*3/mm3 Final   • Basophils, Absolute 02/14/2019 0.09  0.00 - 0.20 10*3/mm3 Final   • Immature Grans, Absolute 02/14/2019 0.05  0.00 - 0.05 10*3/mm3 Final   • nRBC 02/14/2019 0.0  0.0 - 0.0 /100 WBC Final   Office Visit on 02/14/2019   Component Date Value Ref Range Status   • Glucose 02/14/2019 185* 70 - 130 mg/dL Final    RN NotifiedOperator: [id number] RADHA Barrios ID: [id number]   Lab on 02/06/2019   Component Date  Value Ref Range Status   • Report Summary 02/06/2019 FINAL   Final    Comment: ====================================================================  TOXASSURE SELECT 13 (MW)  ====================================================================  Test                             Result       Flag       Units  Drug Present    Lorazepam                      174                     ng/mg creat     Source of lorazepam is a scheduled prescription medication.    Hydrocodone                    593                     ng/mg creat    Hydromorphone                  94                      ng/mg creat    Dihydrocodeine                 35                      ng/mg creat    Norhydrocodone                 1185                    ng/mg creat     Sources of hydrocodone include scheduled prescription     medications. Hydromorphone, dihydrocodeine and norhydrocodone are     expected metabolites of hydrocodone. Hydromorphone and     dihydrocodeine are also available as scheduled prescription     medications.  ====================================================================  Test                                                 Result    Flag   Units      Ref Range    Creatinine              274              mg/dL      >=20  ====================================================================  Declared Medications:   Medication list was not provided.  ====================================================================  For clinical consultation, please call [phone].  ====================================================================   Office Visit on 02/06/2019   Component Date Value Ref Range Status   • Microalbumin, Urine 02/06/2019 2.6  mg/L Final   Admission on 01/16/2019, Discharged on 01/16/2019   Component Date Value Ref Range Status   • Glucose 01/16/2019 124* 60 - 100 mg/dL Final   • BUN 01/16/2019 18  7 - 21 mg/dL Final   • Creatinine 01/16/2019 1.11* 0.50 - 1.00 mg/dL Final   • Sodium 01/16/2019 138  137 - 145  mmol/L Final   • Potassium 01/16/2019 3.2* 3.5 - 5.1 mmol/L Final   • Chloride 01/16/2019 99  95 - 110 mmol/L Final   • CO2 01/16/2019 30.0  22.0 - 31.0 mmol/L Final   • Calcium 01/16/2019 9.2  8.4 - 10.2 mg/dL Final   • eGFR Non African Amer 01/16/2019 51  51 - 120 mL/min/1.73 Final   • BUN/Creatinine Ratio 01/16/2019 16.2  7.0 - 25.0 Final   • Anion Gap 01/16/2019 9.0  5.0 - 15.0 mmol/L Final   • Glucose 01/16/2019 121  70 - 130 mg/dL Final    Result Not ConfirmedOperator: [id number] [place] KERIMeter ID: [id number]   • Case Report 01/16/2019    Final                    Value:Surgical Pathology Report                         Case: [id number]                                  Authorizing Provider:  Ignacio Lord DPM    Collected:           01/16/2019 09:00 AM          Ordering Location:     [facility]             Received:            01/16/2019 10:16 AM                                 [facility]                                                              Pathologist:           Landen Martin MD                                                         Specimen:    Toe, Left, old hardware from left fifth metatarsal                                        • Final Diagnosis 01/16/2019    Final                    Value:This result contains rich text formatting which cannot be displayed here.   • Gross Description 01/16/2019    Final                    Value:This result contains rich text formatting which cannot be displayed here.   • Glucose 01/16/2019 232* 70 - 130 mg/dL Final    RN NotifiedOperator: [id number] [facility] AMANDAMeter ID: [id number]   Appointment on 01/11/2019   Component Date Value Ref Range Status   • MRSA, PCR 01/11/2019 Negative  Negative Final   ]

## 2019-05-10 ENCOUNTER — OFFICE VISIT (OUTPATIENT)
Dept: PODIATRY | Facility: CLINIC | Age: 59
End: 2019-05-10

## 2019-05-10 VITALS — OXYGEN SATURATION: 97 % | HEART RATE: 93 BPM | WEIGHT: 264 LBS | HEIGHT: 68 IN | BODY MASS INDEX: 40.01 KG/M2

## 2019-05-10 DIAGNOSIS — M79.672 LEFT FOOT PAIN: ICD-10-CM

## 2019-05-10 DIAGNOSIS — Q66.70 PES CAVUS: ICD-10-CM

## 2019-05-10 DIAGNOSIS — R26.81 GAIT INSTABILITY: ICD-10-CM

## 2019-05-10 DIAGNOSIS — S92.352K DISPLACED FRACTURE OF FIFTH METATARSAL BONE, LEFT FOOT, SUBSEQUENT ENCOUNTER FOR FRACTURE WITH NONUNION: Primary | ICD-10-CM

## 2019-05-10 DIAGNOSIS — E11.42 DIABETIC POLYNEUROPATHY ASSOCIATED WITH TYPE 2 DIABETES MELLITUS (HCC): ICD-10-CM

## 2019-05-10 PROCEDURE — 99213 OFFICE O/P EST LOW 20 MIN: CPT | Performed by: PODIATRIST

## 2019-05-10 NOTE — PROGRESS NOTES
Ariana Luis Martinez  1960  59 y.o. female   WILMAR Fong - 04/17/19  BS - 150 this morning per patient     Patient presents today with  for follow up on left and right foot    05/10/2019      Chief Complaint   Patient presents with   • Left Foot - Follow-up   • Right Foot - Follow-up       History of Present Illness    Ms Martinez is a 60 y/o diabetic female who presents for f/u left foot revision fifth metatarsal fracture nonunion and Rich calcaneal osteotomy.  Date of surgery 1/16/2019.   In her postoperative course she did fall on postoperative week 1 causing an avulsion fracture of her calcaneal osteotomy.  We treated this conservatively and monitoring with serial x-rays.  She has returned to regular shoes full-time.  States that she does well overall however does note a mild increase in pain to the outside of her foot and general instability with prolonged ambulation.  At last visit we did refer her to physical therapy however she states that she had a death in the family and has not yet attended any therapy sessions.    Past Medical History:   Diagnosis Date   • Angina, class IV (CMS/HCC)    • Anxiety    • Benign paroxysmal positional vertigo    • Carpal tunnel syndrome    • Chronic pain    • Coronary atherosclerosis     hx CABG 2005.  is followed by Dr Kwon   • Depression    • Diabetes mellitus (CMS/HCC)     Type 2, controlled   • Diabetic polyneuropathy (CMS/HCC)    • Elevated cholesterol    • Female stress incontinence    • Gastroparesis    • GERD (gastroesophageal reflux disease)    • Hyperlipidemia    • Hypertension    • Low back pain    • Malaise and fatigue    • Multiple joint pain    • Obesity     Refuses to be weighed   • Otalgia     Both   • Perforation of tympanic membrane     Left   • Postoperative wound infection    • Vitamin D deficiency          Past Surgical History:   Procedure Laterality Date   • ABDOMINAL SURGERY     • ANGIOPLASTY      coronary   • BREAST BIOPSY Right    • CARDIAC  CATHETERIZATION     • CARDIAC CATHETERIZATION N/A 6/20/2017    Procedure: Right Heart Cath;  Surgeon: Can Kwon MD PhD;  Location: [facility] CATH INVASIVE LOCATION;  Service:    • CARPAL TUNNEL RELEASE     • CHOLECYSTECTOMY     • CORONARY ARTERY BYPASS GRAFT  2005   • ENDOSCOPY N/A 10/19/2018    Procedure: ESOPHAGOGASTRODUODENOSCOPY possible dilation;  Surgeon: Julián Maldonado MD;  Location: [facility] ENDOSCOPY;  Service: Gastroenterology   • ENDOSCOPY AND COLONOSCOPY     • FOOT SURGERY      Toes   • GASTRIC BANDING      Revision, laparoscopic   • HYSTERECTOMY     • MOUTH SURGERY     • SALPINGO OOPHORECTOMY     • SHOULDER SURGERY     • SUBTALAR ARTHRODESIS Left 1/16/2019    Procedure: LEFT FOOT HARDWARE REMOVAL, FIFTH METATARSAL , OPEN REDUCTION INTERNAL FIXATION, CALCANEAL OSTEOTOMY;  Surgeon: Ignacio Lord DPM;  Location: [facility] OR;  Service: Podiatry   • TRANSESOPHAGEAL ECHOCARDIOGRAM (LAMONTE)      With color flow         Family History   Problem Relation Age of Onset   • Diabetes Other    • Heart disease Other    • Hypertension Other    • Heart disease Mother    • Stroke Mother    • Hypertension Mother    • Diabetes Sister    • Heart disease Sister    • Hypertension Sister    • Heart disease Sister    • Diabetes Sister    • Hypertension Sister    • Diabetes Sister    • Diabetes Sister    • Diabetes Sister    • Diabetes Sister          Social History     Socioeconomic History   • Marital status:      Spouse name: Not on file   • Number of children: Not on file   • Years of education: Not on file   • Highest education level: Not on file   Tobacco Use   • Smoking status: Never Smoker   • Smokeless tobacco: Never Used   Substance and Sexual Activity   • Alcohol use: No   • Drug use: No   • Sexual activity: Defer         Current Outpatient Medications   Medication Sig Dispense Refill   • ARIPiprazole (ABILIFY) 15 MG tablet Take 1 tablet by mouth Daily. 90 tablet 1   • aspirin 81 MG chewable tablet  Chew 81 mg daily.     • atorvastatin (LIPITOR) 40 MG tablet Take 1 tablet by mouth Every Night. 90 tablet 1   • BD SHARPS CONTAINER HOME misc 1 each Take As Directed. 1 each 0   • Blood Glucose Monitoring Suppl (ONE TOUCH ULTRA MINI) w/Device kit USE AS DIRECTED TO CHECK BLOOD SUGAR 1 each 0   • Calcium Citrate-Vitamin D (CITRACAL/VITAMIN D) 250-200 MG-UNIT tablet Take 2 tablets by mouth 2 (two) times a day.     • ciprofloxacin (CIPRO) 250 MG tablet Take 1 tablet by mouth 2 (Two) Times a Day. 10 tablet 0   • clopidogrel (PLAVIX) 75 MG tablet Take 75 mg by mouth Daily.     • cyanocobalamin 1000 MCG/ML injection Inject 1 mL into the appropriate muscle as directed by prescriber Every 28 (Twenty-Eight) Days. 1 mL 0   • furosemide (LASIX) 40 MG tablet Take 1 tablet by mouth Daily. 90 tablet 1   • gabapentin (NEURONTIN) 400 MG capsule TAKE 1 CAPSULE BY MOUTH THREE TIMES A DAY 90 capsule 2   • gabapentin (NEURONTIN) 400 MG capsule Take 1 capsule by mouth 3 (Three) Times a Day. Fill when due 90 capsule 0   • GLUCAGON EMERGENCY 1 MG injection USE AS DIRECTED AS NEEDED 1 kit 0   • glucose blood (ONE TOUCH ULTRA TEST) test strip 1 each by Other route 4 (Four) Times a Day. Use as instructed 400 each 1   • hydrochlorothiazide (MICROZIDE) 12.5 MG capsule Take 12.5 mg by mouth Daily.     • hydrochlorothiazide (MICROZIDE) 12.5 MG capsule TAKE 1 CAPSULE BY MOUTH DAILY. 90 capsule 0   • HYDROcodone-acetaminophen (NORCO) 7.5-325 MG per tablet Take 1 tablet by mouth Every 4 (Four) Hours As Needed for Moderate Pain  or Severe Pain . Up to 5 times per day- fill when due 180 tablet 0   • insulin aspart (novoLOG FLEXPEN) 100 UNIT/ML solution pen-injector sc pen Inject 60 Units under the skin into the appropriate area as directed 3 (Three) Times a Day With Meals.     • insulin aspart (NOVOLOG FLEXPEN) 100 UNIT/ML solution pen-injector sc pen INJECT 60 UNITS BEFORE MEALS 3 TIMES A DAY 5 pen 5   • Insulin Glargine (BASAGLAR KWIKPEN) 100  "UNIT/ML injection pen Inject 100 Units under the skin into the appropriate area as directed Every Night. 5 pen 5   • Insulin Pen Needle (B-D ULTRAFINE III SHORT PEN) 31G X 8 MM misc Inject 1 each into the shoulder, thigh, or buttocks 4 (Four) Times a Day. use as directed 150 each 11   • LORazepam (ATIVAN) 0.5 MG tablet Take 1 tablet by mouth Daily As Needed for Anxiety. Fill when due 30 tablet 0   • meclizine (ANTIVERT) 25 MG tablet Take 1 tablet by mouth 3 (Three) Times a Day As Needed for dizziness. 90 tablet 6   • meloxicam (MOBIC) 15 MG tablet Take 1 tablet by mouth Daily. Take once daily. 30 tablet 0   • metoclopramide (REGLAN) 10 MG tablet Take 10 mg by mouth 4 (Four) Times a Day As Needed.     • metoprolol succinate XL (TOPROL-XL) 25 MG 24 hr tablet TAKE 1 TABLET BY MOUTH DAILY. 31 tablet 6   • mirtazapine (REMERON) 45 MG tablet TAKE 1 TABLET BY MOUTH EVERY NIGHT. 90 tablet 1   • omeprazole (PRILOSEC) 40 MG capsule Take 1 capsule by mouth Daily. 90 capsule 1   • ONE TOUCH ULTRA TEST test strip USE TO TEST SUGAR 4 TIMES A  each 3   • phenazopyridine (PYRIDIUM) 200 MG tablet Take 1 tablet by mouth 3 (Three) Times a Day As Needed for bladder spasms. 21 tablet 0   • SANTYL 250 UNIT/GM ointment APPLY TO AFFECTED AREA DAILY IN NICKEL INCH THICKNESS  5   • Syringe/Needle, Disp, (LUER LOCK SAFETY SYRINGES) 25G X 5/8\" 3 ML misc 1 each Daily. 100 each 0   • venlafaxine XR (EFFEXOR-XR) 150 MG 24 hr capsule Take 150 mg by mouth 2 (Two) Times a Day.     • vitamin D (ERGOCALCIFEROL) 25801 units capsule capsule Take 50,000 Units by mouth 1 (One) Time Per Week. sunday     • vitamin D (ERGOCALCIFEROL) 40205 units capsule capsule TAKE ONE CAPSULE BY MOUTH EVERY SUNDAY 12 capsule 2     No current facility-administered medications for this visit.      Review of Systems   Constitutional: Negative.    HENT: Negative.    Respiratory: Negative.    Musculoskeletal:        Left foot pain    Neurological: Positive for numbness. " "  Psychiatric/Behavioral: Negative.          OBJECTIVE    Pulse 93   Ht 172.7 cm (67.99\")   Wt 120 kg (264 lb)   SpO2 97%   BMI 40.15 kg/m²         Physical Exam   Constitutional: she appears well-developed and well-nourished.   Psychiatric: she has a normal mood and affect. her   behavior is normal.      Lower Extremity Exam:  Vascular: DP pulses palpable 2+. PT not readily palpable. +signal on doppler  Negative hair growth.   Mild left foot edema  Negative Conrad  Neuro: Protective sensation absent to foot, b/l. Reestablished at mid calf   DTRs intact  Vibratory sensation diminished.  Integument: Left foot incisions well healed.  No signs of infection  No skin tenting is noted to the left heel  Right lateral malleolus ulceration healed  Diffuse xerosis b/l.  Webspaces c/d/i  Musculoskeletal: Left ankle range of motion intact  Minimal tenderness to palpation of left lateral 5th metatarsal.    Achilles tendon intact.         Procedures        ASSESSMENT AND PLAN    Ariana was seen today for follow-up and follow-up.    Diagnoses and all orders for this visit:    Displaced fracture of fifth metatarsal bone, left foot, subsequent encounter for fracture with nonunion    Left foot pain  -     XR Calcaneus 2+ View Left  -     XR Foot 3+ View Left    Pes cavus    Diabetic polyneuropathy associated with type 2 diabetes mellitus (CMS/HCC)    Gait instability        -Patient is doing well overall spite mild residual pain and instability.  -Radiographs ordered and reviewed.  Calcaneal osteotomy/fracture seems to have healed in its current position which is less than ideal however not causing any symptoms at this time therefore we will continue to monitor.  -Will refer to physical therapy for strengthening and gait training  -Recheck 4 weeks          This document has been electronically signed by Ignacio Lord DPM on May 10, 2019 8:43 AM       "

## 2019-05-20 RX ORDER — METOPROLOL SUCCINATE 25 MG/1
25 TABLET, EXTENDED RELEASE ORAL DAILY
Qty: 31 TABLET | Refills: 6 | Status: SHIPPED | OUTPATIENT
Start: 2019-05-20 | End: 2019-11-27 | Stop reason: SDUPTHER

## 2019-05-21 ENCOUNTER — HOSPITAL ENCOUNTER (OUTPATIENT)
Dept: PHYSICAL THERAPY | Facility: HOSPITAL | Age: 59
Setting detail: THERAPIES SERIES
Discharge: HOME | End: 2019-05-21

## 2019-05-21 DIAGNOSIS — Q66.70 PES CAVUS: ICD-10-CM

## 2019-05-21 DIAGNOSIS — R26.81 UNSTEADY GAIT: Primary | ICD-10-CM

## 2019-05-21 DIAGNOSIS — S92.355S CLOSED NONDISPLACED FRACTURE OF FIFTH METATARSAL BONE OF LEFT FOOT, SEQUELA: ICD-10-CM

## 2019-05-21 PROCEDURE — 97110 THERAPEUTIC EXERCISES: CPT | Performed by: PHYSICAL THERAPIST

## 2019-05-21 PROCEDURE — 97161 PT EVAL LOW COMPLEX 20 MIN: CPT | Performed by: PHYSICAL THERAPIST

## 2019-05-21 NOTE — THERAPY EVALUATION
Outpatient Physical Therapy Ortho Initial Evaluation  [facility]     Patient Name: Ariana Martinez  : 1960  MRN: [id number]  Today's Date: 2019 (MD Adonay post a few therapy sessions. Deacon   Visit Date: 2019      Subjective Evaluation    History of Present Illness  Onset date: several yrs non union fracture.  Date of surgery: 2019    Subjective comment: Revision ORIF 5th metatarsal fracture.  Healed from surgery and full wt. still has some pain occasionally. Unsteady walking on it.  Patient Occupation: Unemployed/ retired teacher elementary. Quality of life: good    Pain  Current pain ratin  At best pain ratin  Location: lateral left foot  Quality: dull ache  Relieving factors: rest  Aggravating factors: ambulation, stairs and standing  Progression: improved    Social Support  Lives in: one-story house (2 steps to enter with rail)  Lives with: spouse    Hand dominance: right    Diagnostic Tests  X-ray: abnormal    Treatments  Previous treatment: medication and immobilization  Patient Goals  Patient goals for therapy: decreased pain and improved balance          Patient Active Problem List   Diagnosis   • 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, with long-term current use of insulin (CMS/HCC)   • Closed nondisplaced fracture of fifth left metatarsal bone   • MAURICIO (generalized anxiety disorder)   • Depression, major, recurrent, moderate (CMS/HCC)   • GERD without esophagitis   • Long term prescription opiate use   • Mixed hyperlipidemia   • Vitamin D deficiency   • Seasonal allergic rhinitis   • Restrictive lung disease secondary to obesity   • Snoring   • Class 2 severe obesity due to excess calories with serious comorbidity and body mass index (BMI) of 38.0 to 38.9 in adult (CMS/HCC)   • (HFpEF) heart failure with preserved ejection fraction (CMS/HCC)   • Diabetic peripheral neuropathy associated with type 2 diabetes mellitus (CMS/HCC)   •  Acute renal failure superimposed on stage 3 chronic kidney disease (CMS/[facility])   • Cyanocobalamin deficiency   • CAD (coronary artery disease)   • Hypertension   • Meniere's disease   • Gastroparesis   • Otitis media with effusion, left   • Pulmonary hypertension (CMS/[facility])   • Displaced fracture of fifth metatarsal bone, left foot, subsequent encounter for fracture with nonunion   • Pes cavus   • Primary osteoarthritis involving multiple joints   • Generalized anxiety disorder   • Chronic right-sided low back pain with right-sided sciatica        Past Medical History:   Diagnosis Date   • Angina, class IV (CMS/[facility])    • Anxiety    • Benign paroxysmal positional vertigo    • Carpal tunnel syndrome    • Chronic pain    • Coronary atherosclerosis     hx CABG 2005.  is followed by Dr Kwon   • Depression    • Diabetes mellitus (CMS/[facility])     Type 2, controlled   • Diabetic polyneuropathy (CMS/[facility])    • Elevated cholesterol    • Female stress incontinence    • Gastroparesis    • GERD (gastroesophageal reflux disease)    • Hyperlipidemia    • Hypertension    • Low back pain    • Malaise and fatigue    • Multiple joint pain    • Obesity     Refuses to be weighed   • Otalgia     Both   • Perforation of tympanic membrane     Left   • Postoperative wound infection    • Vitamin D deficiency         Past Surgical History:   Procedure Laterality Date   • ABDOMINAL SURGERY     • ANGIOPLASTY      coronary   • BREAST BIOPSY Right    • CARDIAC CATHETERIZATION     • CARDIAC CATHETERIZATION N/A 6/20/2017    Procedure: Right Heart Cath;  Surgeon: Can Kwon MD PhD;  Location: [facility] CATH INVASIVE LOCATION;  Service:    • CARPAL TUNNEL RELEASE     • CHOLECYSTECTOMY     • CORONARY ARTERY BYPASS GRAFT  2005   • ENDOSCOPY N/A 10/19/2018    Procedure: ESOPHAGOGASTRODUODENOSCOPY possible dilation;  Surgeon: Julián Maldonado MD;  Location: [facility] ENDOSCOPY;  Service: Gastroenterology   • ENDOSCOPY AND COLONOSCOPY     • FOOT  SURGERY      Toes   • GASTRIC BANDING      Revision, laparoscopic   • HYSTERECTOMY     • MOUTH SURGERY     • SALPINGO OOPHORECTOMY     • SHOULDER SURGERY     • SUBTALAR ARTHRODESIS Left 1/16/2019    Procedure: LEFT FOOT HARDWARE REMOVAL, FIFTH METATARSAL , OPEN REDUCTION INTERNAL FIXATION, CALCANEAL OSTEOTOMY;  Surgeon: Ignacio Lord DPM;  Location: [facility];  Service: Podiatry   • TRANSESOPHAGEAL ECHOCARDIOGRAM (LAMONTE)      With color flow   Medications (Admitted on 5/21/2019)    ARIPiprazole (ABILIFY) 15 MG tablet    aspirin 81 MG chewable tablet    atorvastatin (LIPITOR) 40 MG tablet    BD SHARPS CONTAINER HOME misc    Blood Glucose Monitoring Suppl (ONE TOUCH ULTRA MINI) w/Device kit    Calcium Citrate-Vitamin D (CITRACAL/VITAMIN D) 250-200 MG-UNIT tablet    ciprofloxacin (CIPRO) 250 MG tablet    clopidogrel (PLAVIX) 75 MG tablet    cyanocobalamin 1000 MCG/ML injection    furosemide (LASIX) 40 MG tablet    gabapentin (NEURONTIN) 400 MG capsule    gabapentin (NEURONTIN) 400 MG capsule    GLUCAGON EMERGENCY 1 MG injection    glucose blood (ONE TOUCH ULTRA TEST) test strip    hydrochlorothiazide (MICROZIDE) 12.5 MG capsule    hydrochlorothiazide (MICROZIDE) 12.5 MG capsule    HYDROcodone-acetaminophen (NORCO) 7.5-325 MG per tablet    insulin aspart (novoLOG FLEXPEN) 100 UNIT/ML solution pen-injector sc pen    insulin aspart (NOVOLOG FLEXPEN) 100 UNIT/ML solution pen-injector sc pen    Insulin Glargine (BASAGLAR KWIKPEN) 100 UNIT/ML injection pen    Insulin Pen Needle (B-D ULTRAFINE III SHORT PEN) 31G X 8 MM misc    LORazepam (ATIVAN) 0.5 MG tablet    meclizine (ANTIVERT) 25 MG tablet    meloxicam (MOBIC) 15 MG tablet    metoclopramide (REGLAN) 10 MG tablet    metoprolol succinate XL (TOPROL-XL) 25 MG 24 hr tablet    mirtazapine (REMERON) 45 MG tablet    omeprazole (PRILOSEC) 40 MG capsule    ONE TOUCH ULTRA TEST test strip    phenazopyridine (PYRIDIUM) 200 MG tablet    SANTYL 250 UNIT/GM  "ointment    Syringe/Needle, Disp, (LUER LOCK SAFETY SYRINGES) 25G X 5/8\" 3 ML misc    venlafaxine XR (EFFEXOR-XR) 150 MG 24 hr capsule    vitamin D (ERGOCALCIFEROL) 75618 units capsule capsule    vitamin D (ERGOCALCIFEROL) 88852 units capsule capsule   Allergies       Seroquel [Quetiapine Fumarate]Anaphylaxis   Avandia [Rosiglitazone]Swelling   Morphine And RelatedHallucinations   OxycodoneHallucinations       Visit Dx:     ICD-10-CM ICD-9-CM   1. Unsteady gait R26.81 781.2   2. Pes cavus Q66.7 736.73       Objective    PT Ortho     Row Name 05/21/19 1000       Special Tests/Palpation    Special Tests/Palpation  -- TTP ATF, post calcaneus, PF in mid long arch.  -DD       General ROM    GENERAL ROM COMMENTS  AROM DF 3, PF 25, INV 20, eversion 8  -DD       MMT (Manual Muscle Testing)    General MMT Comments  4/5 MMT all planes  -DD       Sensation    Sensation WNL?  WNL  -DD    Light Touch  No apparent deficits  -DD      User Key  (r) = Recorded By, (t) = Taken By, (c) = Cosigned By    Initials Name Provider Type    DD Lucero Camilo, PT DPT Physical Therapist            Therapy Education  Given: HEP  Program: New  How Provided: Verbal, Written  Provided to: Patient  Level of Understanding: Verbalized, Demonstrated    Assessment/Plan     PT OP Goals     Row Name 05/21/19 1000          PT Short Term Goals    STG Date to Achieve  06/11/19  -DD     STG 1  Patient will be independent home exercise program  -DD     STG 2  Patient will have dorsiflexion 7 degrees  -DD     STG 3  Patient plantarflexion 35 degrees  -DD     STG 4  Patient will have eversion 15 degrees  -DD     STG 5  Patient will have inversion 25 degrees  -DD        Long Term Goals    LTG 1  Patient had minimal tenderness to palpation of the posterior calcaneus  -DD     LTG 2  Patient has minimal tenderness to palpation of the ATF  -DD     LTG 3  Patient to have single leg stance on the left to 6 seconds  -DD        Time Calculation    PT Goal Re-Cert " Due Date  06/11/19  -DD       User Key  (r) = Recorded By, (t) = Taken By, (c) = Cosigned By    Initials Name Provider Type    Lucero Stahl PT DPT Physical Therapist          PT Assessment/Plan     Row Name 05/21/19 1017          PT Assessment    Functional Limitations  Impaired gait;Limitation in home management;Limitations in community activities  -DD     Impairments  Gait;Pain;Muscle strength;Range of motion  -DD     Assessment Comments  Pt needs ROM and strength in ankles and would benefit from balance and proprioception exercises.  -DD     Please refer to paper survey for additional self-reported information  Yes  -DD     Rehab Potential  Good  -DD     Patient would benefit from skilled therapy intervention  Yes  -DD        PT Plan    PT Frequency  2x/week  -DD     Predicted Duration of Therapy Intervention (Therapy Eval)  4 weeks  -DD     Planned CPT's?  PT EVAL LOW COMPLEXITY: 24665;PT GAIT TRAINING EA 15 MIN: 32872;PT THER PROC EA 15 MIN: 88171  -DD     Physical Therapy Interventions (Optional Details)  home exercise program;ROM (Range of Motion);strengthening;stretching;gait training  -DD     PT Plan Comments  SLS, tandem stance, Air X CR, and Air x MS, Air x marches.  CR, 4 way tband.  -DD       User Key  (r) = Recorded By, (t) = Taken By, (c) = Cosigned By    Initials Name Provider Type    Luceor Stahl, PT DPT Physical Therapist            Exercises     Row Name 05/21/19 1000             Exercise 1    Exercise Name 1  Ankle DF/PF  -DD         Exercise 2    Exercise Name 2  Ankle EV/INV  -DD         Exercise 3    Exercise Name 3  ankle circles  -DD         Exercise 4    Exercise Name 4  towel stretch  -DD         Exercise 5    Exercise Name 5  towel scrunches  -DD         Exercise 6    Exercise Name 6  heel raise  -DD         Exercise 7    Exercise Name 7  toe raise  -DD         Exercise 8    Exercise Name 8  SLS  -DD      Additional Comments  Righ 10, left 3 sec  -DD        User  Key  (r) = Recorded By, (t) = Taken By, (c) = Cosigned By    Initials Name Provider Type    DD Lucero Camilo, PT BALJITT Physical Therapist           Outcome Measure Options: Lower Extremity Functional Scale (LEFS)  Lower Extremity Functional Index  Any of your usual work, housework or school activities: A little bit of difficulty  Your usual hobbies, recreational or sporting activities: Moderate difficulty  Getting into or out of the bath: Moderate difficulty  Walking between rooms: Moderate difficulty  Putting on your shoes or socks: No difficulty  Squatting: No difficulty  Lifting an object, like a bag of groceries from the floor: A little bit of difficulty  Performing light activities around your home: A little bit of difficulty  Performing heavy activities around your home: Quite a bit of difficulty  Getting into or out of a car: No difficulty  Walking 2 blocks: Quite a bit of difficulty  Walking a mile: Quite a bit of difficulty  Going up or down 10 stairs (about 1 flight of stairs): Quite a bit of difficulty  Standing for 1 hour: Quite a bit of difficulty  Sitting for 1 hour: No difficulty  Running on even ground: Moderate difficulty  Running on uneven ground: Moderate difficulty  Making sharp turns while running fast: Extreme difficulty or unable to perform activity  Hopping: Quite a bit of difficulty  Rolling over in bed: No difficulty  Total: 45      Time Calculation:     Start Time: 0925  Stop Time: 1012  Time Calculation (min): 47 min  Total Timed Code Minutes- PT: 25 minute(s)     Therapy Charges for Today     Code Description Service Date Service Provider Modifiers Qty    52010502510 HC PT THER PROC EA 15 MIN 5/21/2019 Lucero Camilo PT DPT GP 2    16843110003 HC PT EVAL LOW COMPLEXITY 1 5/21/2019 Lucero Camilo PT DPT GP 1          PT G-Codes  Outcome Measure Options: Lower Extremity Functional Scale (LEFS)  Total: 45         Lucero Camilo PT DPT  5/21/2019

## 2019-05-23 ENCOUNTER — OFFICE VISIT (OUTPATIENT)
Dept: FAMILY MEDICINE CLINIC | Facility: CLINIC | Age: 59
End: 2019-05-23

## 2019-05-23 VITALS
RESPIRATION RATE: 16 BRPM | HEART RATE: 63 BPM | TEMPERATURE: 97.7 F | SYSTOLIC BLOOD PRESSURE: 122 MMHG | BODY MASS INDEX: 40.5 KG/M2 | WEIGHT: 267.2 LBS | HEIGHT: 68 IN | DIASTOLIC BLOOD PRESSURE: 70 MMHG | OXYGEN SATURATION: 96 %

## 2019-05-23 DIAGNOSIS — G89.29 CHRONIC RIGHT-SIDED LOW BACK PAIN WITH RIGHT-SIDED SCIATICA: Chronic | ICD-10-CM

## 2019-05-23 DIAGNOSIS — M54.41 CHRONIC RIGHT-SIDED LOW BACK PAIN WITH RIGHT-SIDED SCIATICA: Chronic | ICD-10-CM

## 2019-05-23 PROCEDURE — 99214 OFFICE O/P EST MOD 30 MIN: CPT | Performed by: NURSE PRACTITIONER

## 2019-05-23 RX ORDER — HYDROCODONE BITARTRATE AND ACETAMINOPHEN 7.5; 325 MG/1; MG/1
1 TABLET ORAL EVERY 4 HOURS PRN
Qty: 180 TABLET | Refills: 0 | Status: SHIPPED | OUTPATIENT
Start: 2019-05-23 | End: 2019-06-27 | Stop reason: SDUPTHER

## 2019-05-23 NOTE — PROGRESS NOTES
Chief Complaint   Patient presents with   • Pain     4 week flu med refills     Subjective   Ariana Luis Martinez is a 59 y.o. female who presents to the office for follow-up of chronic low back pain.    The following portions of the patient's history were reviewed and updated as appropriate: allergies, current medications, past family history, past medical history, past social history, past surgical history and problem list.    History of Present Illness     Chronic lower back pain: onset over 5years ago.  right lower back over hip radiates down right buttock. She has a pain stimulator implanted for this as well. This is a constant aching pain, stabbing at times.Exacerbated with any movement. Managed well with hydrocodone, not a candidate for NSAIDS due to anticoagulation with Plavix. Gradual worsening over the years. Pain today is rated 6/10.         Past Medical History:   Diagnosis Date   • Angina, class IV (CMS/HCC)    • Anxiety    • Benign paroxysmal positional vertigo    • Carpal tunnel syndrome    • Chronic pain    • Coronary atherosclerosis     hx CABG 2005.  is followed by Dr Kwon   • Depression    • Diabetes mellitus (CMS/HCC)     Type 2, controlled   • Diabetic polyneuropathy (CMS/HCC)    • Elevated cholesterol    • Female stress incontinence    • Gastroparesis    • GERD (gastroesophageal reflux disease)    • Hyperlipidemia    • Hypertension    • Low back pain    • Malaise and fatigue    • Multiple joint pain    • Obesity     Refuses to be weighed   • Otalgia     Both   • Perforation of tympanic membrane     Left   • Postoperative wound infection    • Vitamin D deficiency           Family History   Problem Relation Age of Onset   • Diabetes Other    • Heart disease Other    • Hypertension Other    • Heart disease Mother    • Stroke Mother    • Hypertension Mother    • Diabetes Sister    • Heart disease Sister    • Hypertension Sister    • Heart disease Sister    • Diabetes Sister    • Hypertension  "Sister    • Diabetes Sister    • Diabetes Sister    • Diabetes Sister    • Diabetes Sister         Review of Systems   Constitutional: Negative for appetite change, chills, fatigue and fever.   HENT: Negative for congestion, ear pain, rhinorrhea and sore throat.    Eyes: Negative for pain.   Respiratory: Negative for cough and shortness of breath.    Cardiovascular: Negative for chest pain and palpitations.   Gastrointestinal: Negative for abdominal pain, constipation, diarrhea and nausea.   Genitourinary: Negative for dysuria.   Musculoskeletal: Positive for arthralgias and back pain. Negative for joint swelling and neck pain.   Skin: Negative for rash.   Neurological: Positive for numbness (BLE, chronic polyneuropathy includes feet.). Negative for dizziness and headaches.   Psychiatric/Behavioral: The patient is nervous/anxious.    All other systems reviewed and are negative.      Objective   Vitals:    05/23/19 1322   BP: 122/70   BP Location: Left arm   Patient Position: Sitting   Cuff Size: Adult   Pulse: 63   Resp: 16   Temp: 97.7 °F (36.5 °C)   SpO2: 96%   Weight: 121 kg (267 lb 3.2 oz)   Height: 172.7 cm (67.99\")   PainSc:   6   PainLoc: Foot     Physical Exam   Constitutional: She is oriented to person, place, and time. She appears well-developed and well-nourished.   HENT:   Head: Normocephalic and atraumatic.   Eyes: Pupils are equal, round, and reactive to light.   Neck: Normal range of motion. Neck supple.   Cardiovascular: Normal rate, regular rhythm and normal heart sounds.   Pulmonary/Chest: Effort normal and breath sounds normal. No respiratory distress. She has no wheezes. She has no rales.   Abdominal: Soft. Bowel sounds are normal.   Musculoskeletal: She exhibits tenderness (left foot, right lower back). She exhibits no edema.        Back:    Neurological: She is alert and oriented to person, place, and time.   Skin: Skin is warm and dry. Capillary refill takes 2 to 3 seconds.   Psychiatric: She " has a normal mood and affect.   Nursing note and vitals reviewed.      Assessment/Plan   Ariana was seen today for pain.    Diagnoses and all orders for this visit:    Chronic right-sided low back pain with right-sided sciatica  -     HYDROcodone-acetaminophen (NORCO) 7.5-325 MG per tablet; Take 1 tablet by mouth Every 4 (Four) Hours As Needed for Moderate Pain  or Severe Pain . Up to 5 times per day- fill when due           PHQ-2/PHQ-9 Depression Screening 5/23/2019   Little interest or pleasure in doing things 0   Feeling down, depressed, or hopeless 0   Total Score 0   Patient understands the risks associated with this controlled medication, including tolerance and addiction.  Patient also agrees to only obtain this medication from me, and not from a another provider, unless that provider is covering for me in my absence.  Patient also agrees to be compliant in dosing, and not self adjust the dose of medication.  A signed controlled substance agreement is on file, and the patient has received a controlled substance education sheet at this a previous visit.  The patient has also signed a consent for treatment with a controlled substance as per [facility] policy. LESTER was obtained.    BEBE Palomino         Return in about 4 weeks (around 6/20/2019).    There are no Patient Instructions on file for this visit.

## 2019-05-30 ENCOUNTER — HOSPITAL ENCOUNTER (OUTPATIENT)
Dept: PHYSICAL THERAPY | Facility: HOSPITAL | Age: 59
Setting detail: THERAPIES SERIES
Discharge: HOME | End: 2019-05-30

## 2019-05-30 DIAGNOSIS — S92.355S CLOSED NONDISPLACED FRACTURE OF FIFTH METATARSAL BONE OF LEFT FOOT, SEQUELA: ICD-10-CM

## 2019-05-30 DIAGNOSIS — R26.81 UNSTEADY GAIT: Primary | ICD-10-CM

## 2019-05-30 DIAGNOSIS — Q66.70 PES CAVUS: ICD-10-CM

## 2019-05-30 PROCEDURE — 97110 THERAPEUTIC EXERCISES: CPT

## 2019-05-30 NOTE — THERAPY TREATMENT NOTE
Outpatient Physical Therapy Ortho Treatment Note  [facility]     Patient Name: Ariana Martinez  : 1960  MRN: [id number]  Today's Date: 2019      Visit Date: 2019   Attendance: 2/3   Subjective improvement: N/A  Recert: 19  MD Appointment: TBD      Visit Dx:    ICD-10-CM ICD-9-CM   1. Unsteady gait R26.81 781.2   2. Pes cavus Q66.7 736.73   3. Closed nondisplaced fracture of fifth metatarsal bone of left foot, sequela S92.355S 905.4       Patient Active Problem List   Diagnosis   • 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, with long-term current use of insulin (CMS/[facility])   • Closed nondisplaced fracture of fifth left metatarsal bone   • MAURICIO (generalized anxiety disorder)   • Depression, major, recurrent, moderate (CMS/HCC)   • GERD without esophagitis   • Long term prescription opiate use   • Mixed hyperlipidemia   • Vitamin D deficiency   • Seasonal allergic rhinitis   • Restrictive lung disease secondary to obesity   • Snoring   • Class 2 severe obesity due to excess calories with serious comorbidity and body mass index (BMI) of 38.0 to 38.9 in adult (CMS/[facility])   • (HFpEF) heart failure with preserved ejection fraction (CMS/[facility])   • Diabetic peripheral neuropathy associated with type 2 diabetes mellitus (CMS/[facility])   • Acute renal failure superimposed on stage 3 chronic kidney disease (CMS/[facility])   • Cyanocobalamin deficiency   • CAD (coronary artery disease)   • Hypertension   • Meniere's disease   • Gastroparesis   • Otitis media with effusion, left   • Pulmonary hypertension (CMS/[facility])   • Displaced fracture of fifth metatarsal bone, left foot, subsequent encounter for fracture with nonunion   • Pes cavus   • Primary osteoarthritis involving multiple joints   • Generalized anxiety disorder   • Chronic right-sided low back pain with right-sided sciatica        Past Medical History:   Diagnosis Date   • Angina, class IV (CMS/HCC)    • Anxiety    • Benign  paroxysmal positional vertigo    • Carpal tunnel syndrome    • Chronic pain    • Coronary atherosclerosis     hx CABG 2005.  is followed by Dr Kwon   • Depression    • Diabetes mellitus (CMS/HCC)     Type 2, controlled   • Diabetic polyneuropathy (CMS/HCC)    • Elevated cholesterol    • Female stress incontinence    • Gastroparesis    • GERD (gastroesophageal reflux disease)    • Hyperlipidemia    • Hypertension    • Low back pain    • Malaise and fatigue    • Multiple joint pain    • Obesity     Refuses to be weighed   • Otalgia     Both   • Perforation of tympanic membrane     Left   • Postoperative wound infection    • Vitamin D deficiency         Past Surgical History:   Procedure Laterality Date   • ABDOMINAL SURGERY     • ANGIOPLASTY      coronary   • BREAST BIOPSY Right    • CARDIAC CATHETERIZATION     • CARDIAC CATHETERIZATION N/A 6/20/2017    Procedure: Right Heart Cath;  Surgeon: Can Kwon MD PhD;  Location: [facility] CATH INVASIVE LOCATION;  Service:    • CARPAL TUNNEL RELEASE     • CHOLECYSTECTOMY     • CORONARY ARTERY BYPASS GRAFT  2005   • ENDOSCOPY N/A 10/19/2018    Procedure: ESOPHAGOGASTRODUODENOSCOPY possible dilation;  Surgeon: Julián Maldonado MD;  Location: [facility] ENDOSCOPY;  Service: Gastroenterology   • ENDOSCOPY AND COLONOSCOPY     • FOOT SURGERY      Toes   • GASTRIC BANDING      Revision, laparoscopic   • HYSTERECTOMY     • MOUTH SURGERY     • SALPINGO OOPHORECTOMY     • SHOULDER SURGERY     • SUBTALAR ARTHRODESIS Left 1/16/2019    Procedure: LEFT FOOT HARDWARE REMOVAL, FIFTH METATARSAL , OPEN REDUCTION INTERNAL FIXATION, CALCANEAL OSTEOTOMY;  Surgeon: Ignacio Lord DPM;  Location: [facility] OR;  Service: Podiatry   • TRANSESOPHAGEAL ECHOCARDIOGRAM (LAMONTE)      With color flow       PT Ortho     Row Name 05/30/19 0900       Subjective Pain    Post-Treatment Pain Level  3  -EM       Posture/Observations    Posture/Observations Comments  Amb with no AD with mild antalgic gt.    " -EM      User Key  (r) = Recorded By, (t) = Taken By, (c) = Cosigned By    Initials Name Provider Type    EM Barry Gamboa, PTA Physical Therapy Assistant                      PT Assessment/Plan     Row Name 05/30/19 0900          PT Assessment    Functional Limitations  Impaired gait;Limitation in home management;Limitations in community activities  -EM     Impairments  Gait;Pain;Muscle strength;Range of motion  -EM     Assessment Comments  Pt becki tx well with reduced therex due to increased pain after last tx, possibly from the SLS.  No goals met this tx.   -EM     Rehab Potential  Good  -EM     Patient/caregiver participated in establishment of treatment plan and goals  Yes  -EM     Patient would benefit from skilled therapy intervention  Yes  -EM        PT Plan    PT Frequency  2x/week  -EM     Predicted Duration of Therapy Intervention (Therapy Eval)  4 wks  -EM     PT Plan Comments  Update HEP and issue TB. Cont current POC with progression as indicated  -EM       User Key  (r) = Recorded By, (t) = Taken By, (c) = Cosigned By    Initials Name Provider Type    EM Barry Gamboa, PTA Physical Therapy Assistant            Exercises     Row Name 05/30/19 0900             Subjective Comments    Subjective Comments  Pt states she had quite a bit of pain after last tx. Reports her heel hurts more than anything else.  Pt leaving to [facility] for vacation June 8-15.   Pt reports difficulty walking with L foot due to foot curvature stating \"It was worse that it used to be\"  -EM         Subjective Pain    Able to rate subjective pain?  yes  -EM      Pre-Treatment Pain Level  3  -EM      Post-Treatment Pain Level  3  -EM         Exercise 1    Exercise Name 1  PRO II-LE only -4.0  -EM      Time 1  10'  -EM         Exercise 2    Exercise Name 2  Incline Calf S  -EM      Sets 2  3  -EM      Time 2  30\"  -EM         Exercise 3    Exercise Name 3  Seated CR/TR  -EM      Sets 3  1  -EM      Reps 3  20  -EM         Exercise 4    " Exercise Name 4  4 Way Ankle AROM  -EM      Sets 4  1  -EM      Reps 4  20  -EM         Exercise 5    Exercise Name 5  4 Way Ankle TB  -EM      Sets 5  1  -EM      Reps 5  20  -EM      Additional Comments  Red  -EM        User Key  (r) = Recorded By, (t) = Taken By, (c) = Cosigned By    Initials Name Provider Type    EM Barry Gamboa PTA Physical Therapy Assistant                       PT OP Goals     Row Name 05/30/19 0900          PT Short Term Goals    STG Date to Achieve  06/11/19  -EM     STG 1  Patient will be independent home exercise program  -EM     STG 1 Progress  Not Met  -EM     STG 2  Patient will have dorsiflexion 7 degrees  -EM     STG 2 Progress  Not Met  -EM     STG 3  Patient plantarflexion 35 degrees  -EM     STG 3 Progress  Not Met  -EM     STG 4  Patient will have eversion 15 degrees  -EM     STG 4 Progress  Not Met  -EM     STG 5  Patient will have inversion 25 degrees  -EM     STG 5 Progress  Not Met  -EM        Long Term Goals    LTG 1  Patient had minimal tenderness to palpation of the posterior calcaneus  -EM     LTG 1 Progress  Not Met  -EM     LTG 2  Patient has minimal tenderness to palpation of the ATF  -EM     LTG 2 Progress  Not Met  -EM     LTG 3  Patient to have single leg stance on the left to 6 seconds  -EM     LTG 3 Progress  Not Met  -EM        Time Calculation    PT Goal Re-Cert Due Date  06/11/19  -EM       User Key  (r) = Recorded By, (t) = Taken By, (c) = Cosigned By    Initials Name Provider Type    EM Barry Gamboa PTA Physical Therapy Assistant                         Time Calculation:   Start Time: 0855  Stop Time: 0937  Time Calculation (min): 42 min  Total Timed Code Minutes- PT: 42 minute(s)  Therapy Charges for Today     Code Description Service Date Service Provider Modifiers Qty    06577516072  PT THER PROC EA 15 MIN 5/30/2019 Barry Gamboa PTA GP 3                    Barry Gamboa PTA  5/30/2019

## 2019-06-03 ENCOUNTER — HOSPITAL ENCOUNTER (OUTPATIENT)
Dept: PHYSICAL THERAPY | Facility: HOSPITAL | Age: 59
Setting detail: THERAPIES SERIES
Discharge: HOME | End: 2019-06-03

## 2019-06-03 ENCOUNTER — OFFICE VISIT (OUTPATIENT)
Dept: ENDOCRINOLOGY | Facility: CLINIC | Age: 59
End: 2019-06-03

## 2019-06-03 VITALS
DIASTOLIC BLOOD PRESSURE: 78 MMHG | BODY MASS INDEX: 39.25 KG/M2 | SYSTOLIC BLOOD PRESSURE: 120 MMHG | HEIGHT: 68 IN | OXYGEN SATURATION: 99 % | WEIGHT: 259 LBS | HEART RATE: 84 BPM

## 2019-06-03 DIAGNOSIS — E55.9 VITAMIN D DEFICIENCY: ICD-10-CM

## 2019-06-03 DIAGNOSIS — IMO0002 UNCONTROLLED TYPE 2 DIABETES MELLITUS WITH NEUROLOGIC COMPLICATION, WITH LONG-TERM CURRENT USE OF INSULIN: Primary | ICD-10-CM

## 2019-06-03 DIAGNOSIS — S92.355S CLOSED NONDISPLACED FRACTURE OF FIFTH METATARSAL BONE OF LEFT FOOT, SEQUELA: ICD-10-CM

## 2019-06-03 DIAGNOSIS — E11.42 DIABETIC PERIPHERAL NEUROPATHY ASSOCIATED WITH TYPE 2 DIABETES MELLITUS (HCC): ICD-10-CM

## 2019-06-03 DIAGNOSIS — R26.81 UNSTEADY GAIT: Primary | ICD-10-CM

## 2019-06-03 DIAGNOSIS — Q66.70 PES CAVUS: ICD-10-CM

## 2019-06-03 DIAGNOSIS — E78.2 MIXED HYPERLIPIDEMIA: ICD-10-CM

## 2019-06-03 PROCEDURE — 99214 OFFICE O/P EST MOD 30 MIN: CPT | Performed by: NURSE PRACTITIONER

## 2019-06-03 PROCEDURE — 97110 THERAPEUTIC EXERCISES: CPT

## 2019-06-03 RX ORDER — HYDROCHLOROTHIAZIDE 12.5 MG/1
CAPSULE, GELATIN COATED ORAL
Qty: 90 CAPSULE | Refills: 0 | OUTPATIENT
Start: 2019-06-03

## 2019-06-03 NOTE — TELEPHONE ENCOUNTER
We only filled for her as she was here and out of meds. Now she needs to go back and get it from her Primary care. Per Elvis

## 2019-06-03 NOTE — THERAPY TREATMENT NOTE
Outpatient Physical Therapy Ortho Treatment Note  [facility]     Patient Name: Ariana Martinze  : 1960  MRN: [id number]  Today's Date: 6/3/2019      Visit Date: 2019   Attendance: 3/4 of 90  Subjective improvement: 20%  Recert:19  MD Appointment: TBD      Visit Dx:    ICD-10-CM ICD-9-CM   1. Unsteady gait R26.81 781.2   2. Pes cavus Q66.7 736.73   3. Closed nondisplaced fracture of fifth metatarsal bone of left foot, sequela S92.355S 905.4       Patient Active Problem List   Diagnosis   • 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, with long-term current use of insulin (CMS/[facility])   • Closed nondisplaced fracture of fifth left metatarsal bone   • MAURICIO (generalized anxiety disorder)   • Depression, major, recurrent, moderate (CMS/HCC)   • GERD without esophagitis   • Long term prescription opiate use   • Mixed hyperlipidemia   • Vitamin D deficiency   • Seasonal allergic rhinitis   • Restrictive lung disease secondary to obesity   • Snoring   • Class 2 severe obesity due to excess calories with serious comorbidity and body mass index (BMI) of 38.0 to 38.9 in adult (CMS/HCC)   • (HFpEF) heart failure with preserved ejection fraction (CMS/[facility])   • Diabetic peripheral neuropathy associated with type 2 diabetes mellitus (CMS/[facility])   • Acute renal failure superimposed on stage 3 chronic kidney disease (CMS/HCC)   • Cyanocobalamin deficiency   • CAD (coronary artery disease)   • Hypertension   • Meniere's disease   • Gastroparesis   • Otitis media with effusion, left   • Pulmonary hypertension (CMS/HCC)   • Displaced fracture of fifth metatarsal bone, left foot, subsequent encounter for fracture with nonunion   • Pes cavus   • Primary osteoarthritis involving multiple joints   • Generalized anxiety disorder   • Chronic right-sided low back pain with right-sided sciatica        Past Medical History:   Diagnosis Date   • Angina, class IV (CMS/HCC)    • Anxiety    •  Benign paroxysmal positional vertigo    • Carpal tunnel syndrome    • Chronic pain    • Coronary atherosclerosis     hx CABG 2005.  is followed by Dr Kwon   • Depression    • Diabetes mellitus (CMS/HCC)     Type 2, controlled   • Diabetic polyneuropathy (CMS/HCC)    • Elevated cholesterol    • Female stress incontinence    • Gastroparesis    • GERD (gastroesophageal reflux disease)    • Hyperlipidemia    • Hypertension    • Low back pain    • Malaise and fatigue    • Multiple joint pain    • Obesity     Refuses to be weighed   • Otalgia     Both   • Perforation of tympanic membrane     Left   • Postoperative wound infection    • Vitamin D deficiency         Past Surgical History:   Procedure Laterality Date   • ABDOMINAL SURGERY     • ANGIOPLASTY      coronary   • BREAST BIOPSY Right    • CARDIAC CATHETERIZATION     • CARDIAC CATHETERIZATION N/A 6/20/2017    Procedure: Right Heart Cath;  Surgeon: Can Kwon MD PhD;  Location: [facility] CATH INVASIVE LOCATION;  Service:    • CARPAL TUNNEL RELEASE     • CHOLECYSTECTOMY     • CORONARY ARTERY BYPASS GRAFT  2005   • ENDOSCOPY N/A 10/19/2018    Procedure: ESOPHAGOGASTRODUODENOSCOPY possible dilation;  Surgeon: Julián Maldonado MD;  Location: [facility] ENDOSCOPY;  Service: Gastroenterology   • ENDOSCOPY AND COLONOSCOPY     • FOOT SURGERY      Toes   • GASTRIC BANDING      Revision, laparoscopic   • HYSTERECTOMY     • MOUTH SURGERY     • SALPINGO OOPHORECTOMY     • SHOULDER SURGERY     • SUBTALAR ARTHRODESIS Left 1/16/2019    Procedure: LEFT FOOT HARDWARE REMOVAL, FIFTH METATARSAL , OPEN REDUCTION INTERNAL FIXATION, CALCANEAL OSTEOTOMY;  Surgeon: Ignacio Lord DPM;  Location: [facility] OR;  Service: Podiatry   • TRANSESOPHAGEAL ECHOCARDIOGRAM (LAMONTE)      With color flow       PT Ortho     Row Name 06/03/19 1100       Subjective Pain    Post-Treatment Pain Level  0  -EM       Posture/Observations    Posture/Observations Comments  Mod TTP L achilles tendon  "insertion region .Amb with no AD with mild antalgic gt.    -EM      User Key  (r) = Recorded By, (t) = Taken By, (c) = Cosigned By    Initials Name Provider Type    Barry Bhatt PTA Physical Therapy Assistant                      PT Assessment/Plan     Row Name 06/03/19 1100          PT Assessment    Functional Limitations  Impaired gait;Limitation in home management;Limitations in community activities  -EM     Impairments  Gait;Pain;Muscle strength;Range of motion  -EM     Assessment Comments  Pt becki tx well, incline calf S reduced due to increased heel pain post tx last tx.  -EM     Rehab Potential  Good  -EM     Patient/caregiver participated in establishment of treatment plan and goals  Yes  -EM     Patient would benefit from skilled therapy intervention  Yes  -EM        PT Plan    PT Frequency  2x/week  -EM     Predicted Duration of Therapy Intervention (Therapy Eval)  4 wks  -EM     PT Plan Comments  Ankle AROM next tx.   -EM       User Key  (r) = Recorded By, (t) = Taken By, (c) = Cosigned By    Initials Name Provider Type    Barry Bhatt PTA Physical Therapy Assistant            Exercises     Row Name 06/03/19 1100             Subjective Comments    Subjective Comments  Pt reports last tx went well except for her L heel had more pain post tx until night time.   -EM         Subjective Pain    Able to rate subjective pain?  yes  -EM      Pre-Treatment Pain Level  3  -EM      Post-Treatment Pain Level  0  -EM         Exercise 1    Exercise Name 1  PRO II-Course-4.0  -EM      Time 1  10'  -EM         Exercise 2    Exercise Name 2  Incline Calf S  -EM      Sets 2  3  -EM      Time 2  30\"  -EM      Additional Comments  Setting 4  -EM         Exercise 3    Exercise Name 3  Ankle Circles: CW, CCW  -EM      Sets 3  1  -EM      Reps 3  20  -EM         Exercise 4    Exercise Name 4  4 Way Ankle AROM  -EM      Sets 4  1  -EM      Reps 4  20  -EM         Exercise 5    Exercise Name 5  Seated CR/TR  -EM      " Sets 5  1  -EM      Reps 5  20  -EM         Exercise 6    Exercise Name 6  Ankle ABC  -EM      Sets 6  x1  -EM      Reps 6  A-Z  -EM         Exercise 7    Exercise Name 7  4 Way Ankle TB  -EM      Sets 7  1  -EM      Reps 7  20  -EM      Additional Comments  Yellow  -EM        User Key  (r) = Recorded By, (t) = Taken By, (c) = Cosigned By    Initials Name Provider Type    Barry Bhatt, MANJINDER Physical Therapy Assistant          Pt declined cryotherapy but states she will ice at home.             PT OP Goals     Row Name 06/03/19 1100          PT Short Term Goals    STG Date to Achieve  06/11/19  -EM     STG 1  Patient will be independent home exercise program  -EM     STG 1 Progress  Not Met  -EM     STG 2  Patient will have dorsiflexion 7 degrees  -EM     STG 2 Progress  Not Met  -EM     STG 3  Patient plantarflexion 35 degrees  -EM     STG 3 Progress  Not Met  -EM     STG 4  Patient will have eversion 15 degrees  -EM     STG 4 Progress  Not Met  -EM     STG 5  Patient will have inversion 25 degrees  -EM     STG 5 Progress  Not Met  -EM        Long Term Goals    LTG 1  Patient had minimal tenderness to palpation of the posterior calcaneus  -EM     LTG 1 Progress  Not Met  -EM     LTG 2  Patient has minimal tenderness to palpation of the ATF  -EM     LTG 2 Progress  Not Met  -EM     LTG 3  Patient to have single leg stance on the left to 6 seconds  -EM     LTG 3 Progress  Not Met  -EM        Time Calculation    PT Goal Re-Cert Due Date  06/11/19  -EM       User Key  (r) = Recorded By, (t) = Taken By, (c) = Cosigned By    Initials Name Provider Type    Barry Bhatt PTA Physical Therapy Assistant          Therapy Education  Education Details: Updated HEP Issued: Calf S w/ towel, 4 Way AROM, 4 Way TB, Ankle Circles CW, CCW, seated CR/TR  Given: HEP  Program: Progressed  How Provided: Verbal, Written, Demonstration  Provided to: Patient  Level of Understanding: Verbalized, Demonstrated              Time  Calculation:   Start Time: 1103  Stop Time: 1145  Time Calculation (min): 42 min  Total Timed Code Minutes- PT: 42 minute(s)  Therapy Charges for Today     Code Description Service Date Service Provider Modifiers Qty    05962739252 HC PT THER PROC EA 15 MIN 6/3/2019 Barry Gamboa, PTA GP 3                    Barry Gamboa, PTA  6/3/2019

## 2019-06-03 NOTE — PROGRESS NOTES
Subjective    Ariana Martinez is a 59 y.o. female. she is here today for follow-up.    History of Present Illness       Duration/Timing: Diabetes mellitus type 2, Age at onset of diabetes: 26 years years, Onset of symptoms gradual  timing - constant     qualtiy - uncontrolled     severity - moderate     patient broke left foot   -----------------------     Severity (Complications/Hospitalizations)  Secondary Macrovascular Complications: No CAD, No CVA, No PAD  Secondary Microvascular Complications: No Diabetic Nephropathy, No Diabetic Retinopathy, Diabetic Neuropathy     Context  Diabetes Regimen: Insulin, Oral Medications                  Lab Results   Component Value Date     HGBA1C 8.1 (H) 11/28/2018                    Blood Glucose Readings     Now on dexcom sensor         Did not bring to office       This am 240     States ate late last night     Has had low in the middle of the night she did decrease Basaglar and has no lows since decrease              Diet  variable carb intake  Exercise: Exercises  walking     Associated Signs/Symptoms  Hyperglycemic Symptoms: No polyuria, No polydipsia, No polyphagia, No weight gain  Hypoglycemic Episodes: Documented symptomatic hypoglycemia, Seizures and syncope related to hypoglycemia                       The following portions of the patient's history were reviewed and updated as appropriate:   Past Medical History:   Diagnosis Date   • Angina, class IV (CMS/HCC)    • Anxiety    • Benign paroxysmal positional vertigo    • Carpal tunnel syndrome    • Chronic pain    • Coronary atherosclerosis     hx CABG 2005.  is followed by Dr Kwon   • Depression    • Diabetes mellitus (CMS/HCC)     Type 2, controlled   • Diabetic polyneuropathy (CMS/HCC)    • Elevated cholesterol    • Female stress incontinence    • Gastroparesis    • GERD (gastroesophageal reflux disease)    • Hyperlipidemia    • Hypertension    • Low back pain    • Malaise and fatigue    • Multiple joint  pain    • Obesity     Refuses to be weighed   • Otalgia     Both   • Perforation of tympanic membrane     Left   • Postoperative wound infection    • Vitamin D deficiency      Past Surgical History:   Procedure Laterality Date   • ABDOMINAL SURGERY     • ANGIOPLASTY      coronary   • BREAST BIOPSY Right    • CARDIAC CATHETERIZATION     • CARDIAC CATHETERIZATION N/A 2017    Procedure: Right Heart Cath;  Surgeon: Can Kwon MD PhD;  Location: [facility] CATH INVASIVE LOCATION;  Service:    • CARPAL TUNNEL RELEASE     • CHOLECYSTECTOMY     • CORONARY ARTERY BYPASS GRAFT     • ENDOSCOPY N/A 10/19/2018    Procedure: ESOPHAGOGASTRODUODENOSCOPY possible dilation;  Surgeon: Julián Maldonado MD;  Location: [facility] ENDOSCOPY;  Service: Gastroenterology   • ENDOSCOPY AND COLONOSCOPY     • FOOT SURGERY      Toes   • GASTRIC BANDING      Revision, laparoscopic   • HYSTERECTOMY     • MOUTH SURGERY     • SALPINGO OOPHORECTOMY     • SHOULDER SURGERY     • SUBTALAR ARTHRODESIS Left 2019    Procedure: LEFT FOOT HARDWARE REMOVAL, FIFTH METATARSAL , OPEN REDUCTION INTERNAL FIXATION, CALCANEAL OSTEOTOMY;  Surgeon: Ignacio Lord DPM;  Location: [facility] OR;  Service: Podiatry   • TRANSESOPHAGEAL ECHOCARDIOGRAM (LAMONTE)      With color flow     Family History   Problem Relation Age of Onset   • Diabetes Other    • Heart disease Other    • Hypertension Other    • Heart disease Mother    • Stroke Mother    • Hypertension Mother    • Diabetes Sister    • Heart disease Sister    • Hypertension Sister    • Heart disease Sister    • Diabetes Sister    • Hypertension Sister    • Diabetes Sister    • Diabetes Sister    • Diabetes Sister    • Diabetes Sister      OB History      Para Term  AB Living    0 0 0 0 0 0    SAB TAB Ectopic Molar Multiple Live Births    0 0 0   0          Current Outpatient Medications   Medication Sig Dispense Refill   • ARIPiprazole (ABILIFY) 15 MG tablet Take 1 tablet by mouth Daily.  90 tablet 1   • aspirin 81 MG chewable tablet Chew 81 mg daily.     • atorvastatin (LIPITOR) 40 MG tablet Take 1 tablet by mouth Every Night. 90 tablet 1   • BD SHARPS CONTAINER HOME misc 1 each Take As Directed. 1 each 0   • Blood Glucose Monitoring Suppl (ONE TOUCH ULTRA MINI) w/Device kit USE AS DIRECTED TO CHECK BLOOD SUGAR 1 each 0   • Calcium Citrate-Vitamin D (CITRACAL/VITAMIN D) 250-200 MG-UNIT tablet Take 2 tablets by mouth 2 (two) times a day.     • clopidogrel (PLAVIX) 75 MG tablet Take 75 mg by mouth Daily.     • cyanocobalamin 1000 MCG/ML injection Inject 1 mL into the appropriate muscle as directed by prescriber Every 28 (Twenty-Eight) Days. 1 mL 0   • furosemide (LASIX) 40 MG tablet Take 1 tablet by mouth Daily. 90 tablet 1   • gabapentin (NEURONTIN) 400 MG capsule TAKE 1 CAPSULE BY MOUTH THREE TIMES A DAY 90 capsule 2   • gabapentin (NEURONTIN) 400 MG capsule Take 1 capsule by mouth 3 (Three) Times a Day. Fill when due 90 capsule 0   • GLUCAGON EMERGENCY 1 MG injection USE AS DIRECTED AS NEEDED 1 kit 0   • glucose blood (ONE TOUCH ULTRA TEST) test strip 1 each by Other route 4 (Four) Times a Day. Use as instructed 400 each 1   • hydrochlorothiazide (MICROZIDE) 12.5 MG capsule Take 12.5 mg by mouth Daily.     • hydrochlorothiazide (MICROZIDE) 12.5 MG capsule TAKE 1 CAPSULE BY MOUTH DAILY. 90 capsule 0   • HYDROcodone-acetaminophen (NORCO) 7.5-325 MG per tablet Take 1 tablet by mouth Every 4 (Four) Hours As Needed for Moderate Pain  or Severe Pain . Up to 5 times per day- fill when due 180 tablet 0   • insulin aspart (novoLOG FLEXPEN) 100 UNIT/ML solution pen-injector sc pen Inject 60 Units under the skin into the appropriate area as directed 3 (Three) Times a Day With Meals.     • insulin aspart (NOVOLOG FLEXPEN) 100 UNIT/ML solution pen-injector sc pen INJECT 60 UNITS BEFORE MEALS 3 TIMES A DAY 5 pen 5   • Insulin Glargine (BASAGLAR KWIKPEN) 100 UNIT/ML injection pen Inject 100 Units under the skin  "into the appropriate area as directed Every Night. 5 pen 5   • Insulin Pen Needle (B-D ULTRAFINE III SHORT PEN) 31G X 8 MM misc Inject 1 each into the shoulder, thigh, or buttocks 4 (Four) Times a Day. use as directed 150 each 11   • LORazepam (ATIVAN) 0.5 MG tablet Take 1 tablet by mouth Daily As Needed for Anxiety. Fill when due 30 tablet 0   • meclizine (ANTIVERT) 25 MG tablet Take 1 tablet by mouth 3 (Three) Times a Day As Needed for dizziness. 90 tablet 6   • meloxicam (MOBIC) 15 MG tablet Take 1 tablet by mouth Daily. Take once daily. 30 tablet 0   • metoclopramide (REGLAN) 10 MG tablet Take 10 mg by mouth 4 (Four) Times a Day As Needed.     • metoprolol succinate XL (TOPROL-XL) 25 MG 24 hr tablet TAKE 1 TABLET BY MOUTH DAILY. 31 tablet 6   • mirtazapine (REMERON) 45 MG tablet TAKE 1 TABLET BY MOUTH EVERY NIGHT. 90 tablet 1   • omeprazole (PRILOSEC) 40 MG capsule Take 1 capsule by mouth Daily. 90 capsule 1   • ONE TOUCH ULTRA TEST test strip USE TO TEST SUGAR 4 TIMES A  each 3   • phenazopyridine (PYRIDIUM) 200 MG tablet Take 1 tablet by mouth 3 (Three) Times a Day As Needed for bladder spasms. 21 tablet 0   • SANTYL 250 UNIT/GM ointment APPLY TO AFFECTED AREA DAILY IN NICKEL INCH THICKNESS  5   • Syringe/Needle, Disp, (LUER LOCK SAFETY SYRINGES) 25G X 5/8\" 3 ML misc 1 each Daily. 100 each 0   • venlafaxine XR (EFFEXOR-XR) 150 MG 24 hr capsule Take 150 mg by mouth 2 (Two) Times a Day.     • vitamin D (ERGOCALCIFEROL) 59234 units capsule capsule Take 50,000 Units by mouth 1 (One) Time Per Week. sunday     • vitamin D (ERGOCALCIFEROL) 85992 units capsule capsule TAKE ONE CAPSULE BY MOUTH EVERY SUNDAY 12 capsule 2     No current facility-administered medications for this visit.      Allergies   Allergen Reactions   • Seroquel [Quetiapine Fumarate] Anaphylaxis   • Avandia [Rosiglitazone] Swelling   • Morphine And Related Hallucinations   • Oxycodone Hallucinations     Social History     Socioeconomic " "History   • Marital status:      Spouse name: Not on file   • Number of children: Not on file   • Years of education: Not on file   • Highest education level: Not on file   Tobacco Use   • Smoking status: Never Smoker   • Smokeless tobacco: Never Used   Substance and Sexual Activity   • Alcohol use: No   • Drug use: No   • Sexual activity: Defer       Review of Systems  Review of Systems   Constitutional: Negative for activity change, appetite change, diaphoresis and fatigue.   HENT: Negative for facial swelling, sneezing, sore throat, tinnitus, trouble swallowing and voice change.    Eyes: Negative for photophobia, pain, discharge, redness, itching and visual disturbance.   Respiratory: Negative for apnea, cough, choking, chest tightness and shortness of breath.    Cardiovascular: Negative for chest pain, palpitations and leg swelling.   Gastrointestinal: Negative for abdominal distention, abdominal pain, constipation, diarrhea, nausea and vomiting.   Endocrine: Negative for cold intolerance, heat intolerance, polydipsia, polyphagia and polyuria.   Genitourinary: Negative for difficulty urinating, dysuria, frequency, hematuria and urgency.   Musculoskeletal: Negative for arthralgias, back pain, gait problem, joint swelling, myalgias, neck pain and neck stiffness.   Skin: Negative for color change, pallor, rash and wound.   Neurological: Negative for dizziness, tremors, weakness, light-headedness, numbness and headaches.   Hematological: Negative for adenopathy. Does not bruise/bleed easily.   Psychiatric/Behavioral: Negative for behavioral problems, confusion and sleep disturbance.        Objective    /78 (BP Location: Right arm)   Pulse 84   Ht 172.7 cm (68\")   Wt 117 kg (259 lb)   SpO2 99%   BMI 39.38 kg/m²   Physical Exam   Constitutional: She is oriented to person, place, and time. She appears well-developed and well-nourished. No distress.   HENT:   Head: Normocephalic and atraumatic.   Right " Ear: External ear normal.   Left Ear: External ear normal.   Nose: Nose normal.   Eyes: Conjunctivae and EOM are normal. Pupils are equal, round, and reactive to light.   Neck: Normal range of motion. Neck supple. No tracheal deviation present. No thyromegaly present.   Cardiovascular: Normal rate, regular rhythm and normal heart sounds.   No murmur heard.  Pulmonary/Chest: Effort normal and breath sounds normal. No respiratory distress. She has no wheezes.   Abdominal: Soft. Bowel sounds are normal. There is no tenderness. There is no rebound and no guarding.   Musculoskeletal: Normal range of motion. She exhibits no edema, tenderness or deformity.   Neurological: She is alert and oriented to person, place, and time. No cranial nerve deficit.   Skin: Skin is warm and dry. No rash noted.   Psychiatric: She has a normal mood and affect. Her behavior is normal. Judgment and thought content normal.       Lab Review  Glucose (mg/dL)   Date Value   02/14/2019 151 (H)   01/16/2019 124 (H)   11/28/2018 232 (H)     Glucose, Arterial (mmol/L)   Date Value   10/14/2017 155     Sodium (mmol/L)   Date Value   02/14/2019 137   01/16/2019 138   11/28/2018 137     Potassium (mmol/L)   Date Value   02/14/2019 3.0 (L)   01/16/2019 3.2 (L)   11/28/2018 3.2 (L)     Chloride (mmol/L)   Date Value   02/14/2019 93 (L)   01/16/2019 99   11/28/2018 99     CO2 (mmol/L)   Date Value   02/14/2019 32.0 (H)   01/16/2019 30.0   11/28/2018 28.0     BUN (mg/dL)   Date Value   02/14/2019 18   01/16/2019 18   11/28/2018 14     Creatinine (mg/dL)   Date Value   02/14/2019 1.23 (H)   01/16/2019 1.11 (H)   11/28/2018 0.85     Hemoglobin A1C (%)   Date Value   11/28/2018 8.1 (H)   07/27/2018 6.4   04/16/2018 7.8 (H)   01/18/2018 8.2 (H)     Triglycerides (mg/dL)   Date Value   04/16/2018 223 (H)   01/18/2018 344 (H)   07/20/2017 389 (H)     LDL Cholesterol  (mg/dL)   Date Value   04/16/2018 108   01/18/2018 130 (H)   07/20/2017 119       Assessment/Plan       1. 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, with long-term current use of insulin (CMS/[facility])    2. Diabetic peripheral neuropathy associated with type 2 diabetes mellitus (CMS/[facility])    3. Mixed hyperlipidemia    4. Vitamin D deficiency    .    Medications prescribed:  Outpatient Encounter Medications as of 6/3/2019   Medication Sig Dispense Refill   • ARIPiprazole (ABILIFY) 15 MG tablet Take 1 tablet by mouth Daily. 90 tablet 1   • aspirin 81 MG chewable tablet Chew 81 mg daily.     • atorvastatin (LIPITOR) 40 MG tablet Take 1 tablet by mouth Every Night. 90 tablet 1   • BD SHARPS CONTAINER HOME misc 1 each Take As Directed. 1 each 0   • Blood Glucose Monitoring Suppl (ONE TOUCH ULTRA MINI) w/Device kit USE AS DIRECTED TO CHECK BLOOD SUGAR 1 each 0   • Calcium Citrate-Vitamin D (CITRACAL/VITAMIN D) 250-200 MG-UNIT tablet Take 2 tablets by mouth 2 (two) times a day.     • clopidogrel (PLAVIX) 75 MG tablet Take 75 mg by mouth Daily.     • cyanocobalamin 1000 MCG/ML injection Inject 1 mL into the appropriate muscle as directed by prescriber Every 28 (Twenty-Eight) Days. 1 mL 0   • furosemide (LASIX) 40 MG tablet Take 1 tablet by mouth Daily. 90 tablet 1   • gabapentin (NEURONTIN) 400 MG capsule TAKE 1 CAPSULE BY MOUTH THREE TIMES A DAY 90 capsule 2   • gabapentin (NEURONTIN) 400 MG capsule Take 1 capsule by mouth 3 (Three) Times a Day. Fill when due 90 capsule 0   • GLUCAGON EMERGENCY 1 MG injection USE AS DIRECTED AS NEEDED 1 kit 0   • glucose blood (ONE TOUCH ULTRA TEST) test strip 1 each by Other route 4 (Four) Times a Day. Use as instructed 400 each 1   • hydrochlorothiazide (MICROZIDE) 12.5 MG capsule Take 12.5 mg by mouth Daily.     • hydrochlorothiazide (MICROZIDE) 12.5 MG capsule TAKE 1 CAPSULE BY MOUTH DAILY. 90 capsule 0   • HYDROcodone-acetaminophen (NORCO) 7.5-325 MG per tablet Take 1 tablet by mouth Every 4 (Four) Hours As Needed for Moderate Pain  or Severe Pain . Up to 5  "times per day- fill when due 180 tablet 0   • insulin aspart (novoLOG FLEXPEN) 100 UNIT/ML solution pen-injector sc pen Inject 60 Units under the skin into the appropriate area as directed 3 (Three) Times a Day With Meals.     • insulin aspart (NOVOLOG FLEXPEN) 100 UNIT/ML solution pen-injector sc pen INJECT 60 UNITS BEFORE MEALS 3 TIMES A DAY 5 pen 5   • Insulin Glargine (BASAGLAR KWIKPEN) 100 UNIT/ML injection pen Inject 100 Units under the skin into the appropriate area as directed Every Night. 5 pen 5   • Insulin Pen Needle (B-D ULTRAFINE III SHORT PEN) 31G X 8 MM misc Inject 1 each into the shoulder, thigh, or buttocks 4 (Four) Times a Day. use as directed 150 each 11   • LORazepam (ATIVAN) 0.5 MG tablet Take 1 tablet by mouth Daily As Needed for Anxiety. Fill when due 30 tablet 0   • meclizine (ANTIVERT) 25 MG tablet Take 1 tablet by mouth 3 (Three) Times a Day As Needed for dizziness. 90 tablet 6   • meloxicam (MOBIC) 15 MG tablet Take 1 tablet by mouth Daily. Take once daily. 30 tablet 0   • metoclopramide (REGLAN) 10 MG tablet Take 10 mg by mouth 4 (Four) Times a Day As Needed.     • metoprolol succinate XL (TOPROL-XL) 25 MG 24 hr tablet TAKE 1 TABLET BY MOUTH DAILY. 31 tablet 6   • mirtazapine (REMERON) 45 MG tablet TAKE 1 TABLET BY MOUTH EVERY NIGHT. 90 tablet 1   • omeprazole (PRILOSEC) 40 MG capsule Take 1 capsule by mouth Daily. 90 capsule 1   • ONE TOUCH ULTRA TEST test strip USE TO TEST SUGAR 4 TIMES A  each 3   • phenazopyridine (PYRIDIUM) 200 MG tablet Take 1 tablet by mouth 3 (Three) Times a Day As Needed for bladder spasms. 21 tablet 0   • SANTYL 250 UNIT/GM ointment APPLY TO AFFECTED AREA DAILY IN NICKEL INCH THICKNESS  5   • Syringe/Needle, Disp, (LUER LOCK SAFETY SYRINGES) 25G X 5/8\" 3 ML misc 1 each Daily. 100 each 0   • venlafaxine XR (EFFEXOR-XR) 150 MG 24 hr capsule Take 150 mg by mouth 2 (Two) Times a Day.     • vitamin D (ERGOCALCIFEROL) 69862 units capsule capsule Take 50,000 " Units by mouth 1 (One) Time Per Week. sunday     • vitamin D (ERGOCALCIFEROL) 25895 units capsule capsule TAKE ONE CAPSULE BY MOUTH EVERY SUNDAY 12 capsule 2   • [DISCONTINUED] ciprofloxacin (CIPRO) 250 MG tablet Take 1 tablet by mouth 2 (Two) Times a Day. 10 tablet 0     No facility-administered encounter medications on file as of 6/3/2019.        Orders placed during this encounter include:  Orders Placed This Encounter   Procedures   • Comprehensive Metabolic Panel   • Hemoglobin A1c   • TSH   • Vitamin D 25 Hydroxy   • CBC & Differential     Order Specific Question:   Manual Differential     Answer:   No     Glycemic Management      Lab Results   Component Value Date    HGBA1C 8.1 (H) 11/28/2018                      Dexcom sensor ---            Basaglar taking 80 units         ==================        Novolog      Taking 60 units      Discussed carb counting but states cannot     She will need to look at her meals because 30 units may not be enough for some meals and for others meals to strong     If you eat a meal and give 30 units and your sugar is 200 or higher before the next meal look back at that meal and the next time you eat it either give more insulin or eat less     Also if you have a low after the meal give less insulin the next time you eat that meal         ==================     Jardiance 10 mg tablet , take before breakfast---will stop due to dizziness for possible volume depletion-----the dizziness has resolved since stopping jardiance        ==================     Metformin 500 mg tablets - caused diarrhea --stopped      ====================     start bydureon - stopped        Victoza stopped due to side effects      Warned of the gastric side effects she does have gastroparesis but she wants to try      Gave a sample she will let me know if she wants an RX called in      If vomiting or abdominal pain stop        januvia 100 mg daily  -stopped      ------------------------------     Lipid  Management        on Lipitor   on fenofibrate                 LDL needs to be 70 or less     add zetia     Also discussed restarting the jardiance for the heart benefits but refuses     Component      Latest Ref Rng & Units 4/16/2018   Total Cholesterol      0 - 199 mg/dL 197   Triglycerides      20 - 199 mg/dL 223 (H)   HDL Cholesterol      60 - 200 mg/dL 33 (L)   LDL Cholesterol       1 - 129 mg/dL 108   LDL/HDL Ratio      0.00 - 3.22 3.62 (H)               Blood Pressure Management: Control of blood pressure  on ACEi     stopped the lasix         Microvascular Complication Monitoring: Neuropathy type sensorial     Neurontin 400 mg po TID -- moderate relief but not complete           intermittetly takes reglan for gastroparesis     states eye exam ws 2018  RX for shoes - diabetic neuropathy      Immunization - last influenza vaccine Oct. 2018        Other Diabetes Related Aspects     TSH abnormal from July to Sept 2014     TSH in the 5 to 7 range                 she refuses thyroid medicaton      Lab Results   Component Value Date    TSH 3.950 07/21/2018             ---     3-15     B12 low      now b12 shots     June 2015     B12- 968     Vitamin d def        Vitamin d - 26      Continue vitamin d supplement      April 2018     Vitamin d -28         Referral to GI      Reason - diarrhea chronic      History of gastroparesis- -takes reglan          4. Follow-up: Return in about 3 months (around 9/3/2019) for Recheck.

## 2019-06-05 ENCOUNTER — APPOINTMENT (OUTPATIENT)
Dept: LAB | Facility: HOSPITAL | Age: 59
End: 2019-06-05

## 2019-06-05 ENCOUNTER — HOSPITAL ENCOUNTER (OUTPATIENT)
Dept: PHYSICAL THERAPY | Facility: HOSPITAL | Age: 59
Setting detail: THERAPIES SERIES
Discharge: HOME | End: 2019-06-05

## 2019-06-05 DIAGNOSIS — S92.355S CLOSED NONDISPLACED FRACTURE OF FIFTH METATARSAL BONE OF LEFT FOOT, SEQUELA: ICD-10-CM

## 2019-06-05 DIAGNOSIS — R26.81 UNSTEADY GAIT: Primary | ICD-10-CM

## 2019-06-05 DIAGNOSIS — Q66.70 PES CAVUS: ICD-10-CM

## 2019-06-05 PROCEDURE — 97110 THERAPEUTIC EXERCISES: CPT

## 2019-06-05 NOTE — THERAPY TREATMENT NOTE
Outpatient Physical Therapy Ortho Treatment Note  [facility]     Patient Name: Ariana Martinez  : 1960  MRN: [id number]  Today's Date: 2019      Visit Date: 2019   Attendance:   Subjective improvement: 20%  Recert: 19  MD Appointment: TBD      Visit Dx:    ICD-10-CM ICD-9-CM   1. Unsteady gait R26.81 781.2   2. Pes cavus Q66.7 736.73   3. Closed nondisplaced fracture of fifth metatarsal bone of left foot, sequela S92.355S 905.4       Patient Active Problem List   Diagnosis   • 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, with long-term current use of insulin (CMS/[facility])   • Closed nondisplaced fracture of fifth left metatarsal bone   • MAURICIO (generalized anxiety disorder)   • Depression, major, recurrent, moderate (CMS/HCC)   • GERD without esophagitis   • Long term prescription opiate use   • Mixed hyperlipidemia   • Vitamin D deficiency   • Seasonal allergic rhinitis   • Restrictive lung disease secondary to obesity   • Snoring   • Class 2 severe obesity due to excess calories with serious comorbidity and body mass index (BMI) of 38.0 to 38.9 in adult (CMS/HCC)   • (HFpEF) heart failure with preserved ejection fraction (CMS/[facility])   • Diabetic peripheral neuropathy associated with type 2 diabetes mellitus (CMS/[facility])   • Acute renal failure superimposed on stage 3 chronic kidney disease (CMS/HCC)   • Cyanocobalamin deficiency   • CAD (coronary artery disease)   • Hypertension   • Meniere's disease   • Gastroparesis   • Otitis media with effusion, left   • Pulmonary hypertension (CMS/HCC)   • Displaced fracture of fifth metatarsal bone, left foot, subsequent encounter for fracture with nonunion   • Pes cavus   • Primary osteoarthritis involving multiple joints   • Generalized anxiety disorder   • Chronic right-sided low back pain with right-sided sciatica        Past Medical History:   Diagnosis Date   • Angina, class IV (CMS/HCC)    • Anxiety    •  Benign paroxysmal positional vertigo    • Carpal tunnel syndrome    • Chronic pain    • Coronary atherosclerosis     hx CABG 2005.  is followed by Dr Kwon   • Depression    • Diabetes mellitus (CMS/HCC)     Type 2, controlled   • Diabetic polyneuropathy (CMS/HCC)    • Elevated cholesterol    • Female stress incontinence    • Gastroparesis    • GERD (gastroesophageal reflux disease)    • Hyperlipidemia    • Hypertension    • Low back pain    • Malaise and fatigue    • Multiple joint pain    • Obesity     Refuses to be weighed   • Otalgia     Both   • Perforation of tympanic membrane     Left   • Postoperative wound infection    • Vitamin D deficiency         Past Surgical History:   Procedure Laterality Date   • ABDOMINAL SURGERY     • ANGIOPLASTY      coronary   • BREAST BIOPSY Right    • CARDIAC CATHETERIZATION     • CARDIAC CATHETERIZATION N/A 6/20/2017    Procedure: Right Heart Cath;  Surgeon: Can Kwon MD PhD;  Location: [facility] CATH INVASIVE LOCATION;  Service:    • CARPAL TUNNEL RELEASE     • CHOLECYSTECTOMY     • CORONARY ARTERY BYPASS GRAFT  2005   • ENDOSCOPY N/A 10/19/2018    Procedure: ESOPHAGOGASTRODUODENOSCOPY possible dilation;  Surgeon: Julián Maldonado MD;  Location: [facility] ENDOSCOPY;  Service: Gastroenterology   • ENDOSCOPY AND COLONOSCOPY     • FOOT SURGERY      Toes   • GASTRIC BANDING      Revision, laparoscopic   • HYSTERECTOMY     • MOUTH SURGERY     • SALPINGO OOPHORECTOMY     • SHOULDER SURGERY     • SUBTALAR ARTHRODESIS Left 1/16/2019    Procedure: LEFT FOOT HARDWARE REMOVAL, FIFTH METATARSAL , OPEN REDUCTION INTERNAL FIXATION, CALCANEAL OSTEOTOMY;  Surgeon: Ignacio Lord DPM;  Location: [facility] OR;  Service: Podiatry   • TRANSESOPHAGEAL ECHOCARDIOGRAM (LAMONTE)      With color flow       PT Ortho     Row Name 06/05/19 0800       Posture/Observations    Posture/Observations Comments  Mod TTP L achilles tendon insertion region .Amb with no AD with mild antalgic gt.    -EM        General ROM    GENERAL ROM COMMENTS  L ankle AROM: DF: 6, PF: 33, IV: 29, EV: 10  -EM    Row Name 06/03/19 1100       Subjective Pain    Post-Treatment Pain Level  0  -EM       Posture/Observations    Posture/Observations Comments  Mod TTP L achilles tendon insertion region .Amb with no AD with mild antalgic gt.    -EM      User Key  (r) = Recorded By, (t) = Taken By, (c) = Cosigned By    Initials Name Provider Type    Barry Bhatt PTA Physical Therapy Assistant                      PT Assessment/Plan     Row Name 06/05/19 0800          PT Assessment    Functional Limitations  Impaired gait;Limitation in home management;Limitations in community activities  -EM     Impairments  Gait;Pain;Muscle strength;Range of motion  -EM     Assessment Comments  Primary PT agreeble for ice massage. PT cont to reduce therex due to increased pain from the prior tx.  Added ice massage and eliminated standing calf S to r/o cause of increased pain. Pt had improved ROM in all directions measured.  2 goals met this tx.  -EM     Rehab Potential  Good  -EM     Patient/caregiver participated in establishment of treatment plan and goals  Yes  -EM     Patient would benefit from skilled therapy intervention  Yes  -EM        PT Plan    PT Frequency  2x/week  -EM     Predicted Duration of Therapy Intervention (Therapy Eval)  4 wks  -EM     PT Plan Comments  Cont current POC, slowly progress as pain allows. Recert due soon when pt returns from vacation.   -EM       User Key  (r) = Recorded By, (t) = Taken By, (c) = Cosigned By    Initials Name Provider Type    Barry Bhatt PTA Physical Therapy Assistant          Modalities     Row Name 06/05/19 0800             Ice    Ice Applied  Yes ice massage  -EM      Location  L heel  -EM      Rx Minutes  Other: 5'  -EM      Ice S/P Rx  Yes  -EM        User Key  (r) = Recorded By, (t) = Taken By, (c) = Cosigned By    Initials Name Provider Type    Barry Bhatt PTA Physical Therapy  "Assistant        Exercises     Row Name 06/05/19 0800             Subjective Comments    Subjective Comments  Pt reports she hurt more yesterday and today. \"I feel like my foot is wanting to turn out... I feel like it is going to roll over\"-no known reason. Pt states she is compliant with HEP. Pt going on vacation to [facility] from 6/8-6/15.  -EM         Subjective Pain    Able to rate subjective pain?  yes  -EM      Pre-Treatment Pain Level  4  -EM         Exercise 1    Exercise Name 1  PRO II-4.0  -EM      Time 1  10'  -EM         Exercise 2    Exercise Name 2  Longsitting Calg S w/ strap  -EM      Sets 2  3  -EM      Time 2  30\"  -EM         Exercise 3    Exercise Name 3  Ankle Circles: CW, CCW  -EM      Sets 3  1  -EM      Reps 3  30  -EM         Exercise 4    Exercise Name 4  4 Way Ankle AROM  -EM      Sets 4  1  -EM      Reps 4  30  -EM         Exercise 5    Exercise Name 5  4 Way Ankle TB  -EM      Sets 5  1  -EM      Reps 5  30  -EM      Additional Comments  Yellow-Increased pain with IV  -EM         Exercise 6    Exercise Name 6  Ankle ABC  -EM      Sets 6  2  -EM      Reps 6  A-Z  -EM         Exercise 7    Exercise Name 7  Ice massage  -EM      Time 7  5'  -EM        User Key  (r) = Recorded By, (t) = Taken By, (c) = Cosigned By    Initials Name Provider Type    EM Barry Gamboa, PTA Physical Therapy Assistant                       PT OP Goals     Row Name 06/05/19 0800          PT Short Term Goals    STG Date to Achieve  06/11/19  -EM     STG 1  Patient will be independent home exercise program  -EM     STG 1 Progress  Met  (Significant)   -EM     STG 2  Patient will have dorsiflexion 7 degrees  -EM     STG 2 Progress  Not Met  -EM     STG 3  Patient plantarflexion 35 degrees  -EM     STG 3 Progress  Not Met  -EM     STG 4  Patient will have eversion 15 degrees  -EM     STG 4 Progress  Not Met  -EM     STG 5  Patient will have inversion 25 degrees  -EM     STG 5 Progress  Met  (Significant)   -EM        " Long Term Goals    LTG 1  Patient had minimal tenderness to palpation of the posterior calcaneus  -EM     LTG 1 Progress  Not Met  -EM     LTG 2  Patient has minimal tenderness to palpation of the ATF  -EM     LTG 2 Progress  Not Met  -EM     LTG 3  Patient to have single leg stance on the left to 6 seconds  -EM     LTG 3 Progress  Not Met  -EM        Time Calculation    PT Goal Re-Cert Due Date  06/11/19  -EM       User Key  (r) = Recorded By, (t) = Taken By, (c) = Cosigned By    Initials Name Provider Type    EM Barry Gamboa, MANJINDER Physical Therapy Assistant                         Time Calculation:   Start Time: 0800  Stop Time: 0856  Time Calculation (min): 56 min  PT Non-Billable Time (min): 5 min  Total Timed Code Minutes- PT: 51 minute(s)  Therapy Charges for Today     Code Description Service Date Service Provider Modifiers Qty    11622463352 HC PT THER PROC EA 15 MIN 6/5/2019 Barry Gamboa PTA GP 3    04941833802 HC PT THER SUPP EA 15 MIN 6/5/2019 Barry Gamboa PTA GP 1                    Barry Gamboa PTA  6/5/2019

## 2019-06-06 RX ORDER — HYDROCHLOROTHIAZIDE 12.5 MG/1
CAPSULE, GELATIN COATED ORAL
Qty: 90 CAPSULE | Refills: 0 | Status: SHIPPED | OUTPATIENT
Start: 2019-06-06 | End: 2019-08-22

## 2019-06-07 ENCOUNTER — TELEPHONE (OUTPATIENT)
Dept: FAMILY MEDICINE CLINIC | Facility: CLINIC | Age: 59
End: 2019-06-07

## 2019-06-07 ENCOUNTER — OFFICE VISIT (OUTPATIENT)
Dept: FAMILY MEDICINE CLINIC | Facility: CLINIC | Age: 59
End: 2019-06-07

## 2019-06-07 ENCOUNTER — LAB (OUTPATIENT)
Dept: LAB | Facility: OTHER | Age: 59
End: 2019-06-07

## 2019-06-07 VITALS
WEIGHT: 267 LBS | SYSTOLIC BLOOD PRESSURE: 124 MMHG | BODY MASS INDEX: 40.47 KG/M2 | DIASTOLIC BLOOD PRESSURE: 78 MMHG | HEART RATE: 85 BPM | OXYGEN SATURATION: 96 % | TEMPERATURE: 97.8 F | HEIGHT: 68 IN

## 2019-06-07 DIAGNOSIS — E78.2 MIXED HYPERLIPIDEMIA: ICD-10-CM

## 2019-06-07 DIAGNOSIS — I10 ESSENTIAL HYPERTENSION: ICD-10-CM

## 2019-06-07 DIAGNOSIS — M15.9 PRIMARY OSTEOARTHRITIS INVOLVING MULTIPLE JOINTS: ICD-10-CM

## 2019-06-07 DIAGNOSIS — IMO0002 UNCONTROLLED TYPE 2 DIABETES MELLITUS WITH NEUROLOGIC COMPLICATION, WITH LONG-TERM CURRENT USE OF INSULIN: Primary | ICD-10-CM

## 2019-06-07 DIAGNOSIS — E66.01 CLASS 3 SEVERE OBESITY DUE TO EXCESS CALORIES WITHOUT SERIOUS COMORBIDITY WITH BODY MASS INDEX (BMI) OF 40.0 TO 44.9 IN ADULT (HCC): ICD-10-CM

## 2019-06-07 DIAGNOSIS — F41.1 GENERALIZED ANXIETY DISORDER: ICD-10-CM

## 2019-06-07 PROBLEM — N18.30 ACUTE RENAL FAILURE SUPERIMPOSED ON STAGE 3 CHRONIC KIDNEY DISEASE (HCC): Status: RESOLVED | Noted: 2017-08-27 | Resolved: 2019-06-07

## 2019-06-07 PROBLEM — N17.9 ACUTE RENAL FAILURE SUPERIMPOSED ON STAGE 3 CHRONIC KIDNEY DISEASE (HCC): Status: RESOLVED | Noted: 2017-08-27 | Resolved: 2019-06-07

## 2019-06-07 LAB
ALBUMIN SERPL-MCNC: 4.1 G/DL (ref 3.5–5)
ALBUMIN/GLOB SERPL: 1.2 G/DL (ref 1.1–1.8)
ALP SERPL-CCNC: 167 U/L (ref 38–126)
ALT SERPL W P-5'-P-CCNC: 23 U/L
ANION GAP SERPL CALCULATED.3IONS-SCNC: 10 MMOL/L (ref 5–15)
AST SERPL-CCNC: 19 U/L (ref 14–36)
BASOPHILS # BLD AUTO: 0.03 10*3/MM3 (ref 0–0.2)
BASOPHILS NFR BLD AUTO: 0.3 % (ref 0–1.5)
BILIRUB SERPL-MCNC: 0.3 MG/DL (ref 0.2–1.3)
BUN BLD-MCNC: 17 MG/DL (ref 7–23)
BUN/CREAT SERPL: 17.9 (ref 7–25)
CALCIUM SPEC-SCNC: 9.4 MG/DL (ref 8.4–10.2)
CHLORIDE SERPL-SCNC: 100 MMOL/L (ref 101–112)
CHOLEST SERPL-MCNC: 184 MG/DL (ref 150–200)
CO2 SERPL-SCNC: 27 MMOL/L (ref 22–30)
CREAT BLD-MCNC: 0.95 MG/DL (ref 0.52–1.04)
DEPRECATED RDW RBC AUTO: 44.3 FL (ref 37–54)
EOSINOPHIL # BLD AUTO: 0.35 10*3/MM3 (ref 0–0.4)
EOSINOPHIL NFR BLD AUTO: 3 % (ref 0.3–6.2)
ERYTHROCYTE [DISTWIDTH] IN BLOOD BY AUTOMATED COUNT: 14 % (ref 12.3–15.4)
GFR SERPL CREATININE-BSD FRML MDRD: 61 ML/MIN/1.73 (ref 51–120)
GLOBULIN UR ELPH-MCNC: 3.3 GM/DL (ref 2.3–3.5)
GLUCOSE BLD-MCNC: 333 MG/DL (ref 70–99)
HCT VFR BLD AUTO: 39.5 % (ref 34–46.6)
HDLC SERPL-MCNC: 42 MG/DL (ref 40–59)
HGB BLD-MCNC: 12.8 G/DL (ref 12–15.9)
LDLC SERPL CALC-MCNC: 98 MG/DL
LDLC/HDLC SERPL: 2.34 {RATIO} (ref 0–3.22)
LYMPHOCYTES # BLD AUTO: 2.24 10*3/MM3 (ref 0.7–3.1)
LYMPHOCYTES NFR BLD AUTO: 19.2 % (ref 19.6–45.3)
MCH RBC QN AUTO: 28.6 PG (ref 26.6–33)
MCHC RBC AUTO-ENTMCNC: 32.4 G/DL (ref 31.5–35.7)
MCV RBC AUTO: 88.2 FL (ref 79–97)
MONOCYTES # BLD AUTO: 0.69 10*3/MM3 (ref 0.1–0.9)
MONOCYTES NFR BLD AUTO: 5.9 % (ref 5–12)
NEUTROPHILS # BLD AUTO: 8.38 10*3/MM3 (ref 1.7–7)
NEUTROPHILS NFR BLD AUTO: 71.6 % (ref 42.7–76)
PLATELET # BLD AUTO: 452 10*3/MM3 (ref 140–450)
PMV BLD AUTO: 9.3 FL (ref 6–12)
POTASSIUM BLD-SCNC: 3.9 MMOL/L (ref 3.4–5)
PROT SERPL-MCNC: 7.4 G/DL (ref 6.3–8.6)
RBC # BLD AUTO: 4.48 10*6/MM3 (ref 3.77–5.28)
SODIUM BLD-SCNC: 137 MMOL/L (ref 137–145)
TRIGL SERPL-MCNC: 219 MG/DL
VLDLC SERPL-MCNC: 43.8 MG/DL
WBC NRBC COR # BLD: 11.69 10*3/MM3 (ref 3.4–10.8)

## 2019-06-07 PROCEDURE — 99214 OFFICE O/P EST MOD 30 MIN: CPT | Performed by: FAMILY MEDICINE

## 2019-06-07 PROCEDURE — 80061 LIPID PANEL: CPT | Performed by: FAMILY MEDICINE

## 2019-06-07 PROCEDURE — 84443 ASSAY THYROID STIM HORMONE: CPT | Performed by: NURSE PRACTITIONER

## 2019-06-07 PROCEDURE — 80053 COMPREHEN METABOLIC PANEL: CPT | Performed by: NURSE PRACTITIONER

## 2019-06-07 PROCEDURE — 82306 VITAMIN D 25 HYDROXY: CPT | Performed by: NURSE PRACTITIONER

## 2019-06-07 PROCEDURE — 36415 COLL VENOUS BLD VENIPUNCTURE: CPT | Performed by: NURSE PRACTITIONER

## 2019-06-07 PROCEDURE — 36415 COLL VENOUS BLD VENIPUNCTURE: CPT | Performed by: FAMILY MEDICINE

## 2019-06-07 PROCEDURE — 83036 HEMOGLOBIN GLYCOSYLATED A1C: CPT | Performed by: NURSE PRACTITIONER

## 2019-06-07 PROCEDURE — 85025 COMPLETE CBC W/AUTO DIFF WBC: CPT | Performed by: NURSE PRACTITIONER

## 2019-06-07 PROCEDURE — 82607 VITAMIN B-12: CPT | Performed by: NURSE PRACTITIONER

## 2019-06-07 NOTE — PROGRESS NOTES
"Subjective   Chief Complaint   Patient presents with   • Diabetes   • Anxiety     Ariana Martinez is a 59 y.o. female.   Diabetes and Anxiety    History of Present Illness     Diabetes - managed with insulin  Followed by endocrinology  Average readings of FBG levels are 100-140  Currently prescribed novolog, tresiba  Has previously failed metformin, bydureon, jardiance, januvia  Diabetic eye exam performed by Dr Tomás Mae for lipid panel    Generalized anxiety disorder - managed with ativan PRN    Diabetic peripheral neuropathy - confirmed by EMG with Dr Mansfield  Pain managed with gabapentin    Hypertension - blood pressure currently managed with hctz, lasix, toprol    Chronic back and joint pain - controlled with norco  uds up to date     The following portions of the patient's history were reviewed and updated as appropriate: allergies, current medications, past family history, past medical history, past social history, past surgical history and problem list.    Review of Systems   Constitutional: Negative for appetite change, chills, fatigue and fever.   HENT: Negative for congestion, ear pain, rhinorrhea and sore throat.    Eyes: Negative for pain.   Respiratory: Negative for cough and shortness of breath.    Cardiovascular: Negative for chest pain and palpitations.   Gastrointestinal: Negative for abdominal pain, constipation, diarrhea and nausea.   Genitourinary: Negative for dysuria.   Musculoskeletal: Negative for back pain, joint swelling and neck pain.   Skin: Negative for rash.   Neurological: Negative for dizziness and headaches.       Objective   /78   Pulse 85   Temp 97.8 °F (36.6 °C) (Oral)   Ht 172.7 cm (68\")   Wt 121 kg (267 lb)   SpO2 96%   BMI 40.60 kg/m²   Physical Exam   Constitutional: She is oriented to person, place, and time. She appears well-developed and well-nourished.   HENT:   Head: Normocephalic and atraumatic.   Eyes: Pupils are equal, round, and reactive to light. "   Neck: Normal range of motion. Neck supple.   Cardiovascular: Normal rate, regular rhythm and normal heart sounds.   Pulmonary/Chest: Effort normal and breath sounds normal. No respiratory distress. She has no wheezes. She has no rales.   Abdominal: Soft. Bowel sounds are normal.   Central obesity   Musculoskeletal: Normal range of motion.   Neurological: She is alert and oriented to person, place, and time.   Skin: Skin is warm and dry.   Psychiatric: She has a normal mood and affect.   Nursing note and vitals reviewed.      Assessment/Plan   Problems Addressed this Visit        Cardiovascular and Mediastinum    Mixed hyperlipidemia    Relevant Orders    Lipid panel (Completed)    Hypertension       Digestive    Class 3 severe obesity due to excess calories without serious comorbidity with body mass index (BMI) of 40.0 to 44.9 in adult (CMS/[facility])       Endocrine    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, with long-term current use of insulin (CMS/[facility]) - Primary       Musculoskeletal and Integument    Primary osteoarthritis involving multiple joints       Other    Generalized anxiety disorder        Lab work ordered for endocrinology to perform  Added lipid panel    Patient's Body mass index is 40.6 kg/m². BMI is above normal parameters. Recommendations include: exercise counseling and nutrition counseling.    The patient has read and signed the [facility] Controlled Substance Contract.  I will continue to see patient for regular follow up appointments.  They are well controlled on their medication.  LESTER is updated every 3 months. The patient is aware of the potential for addiction and dependence.  uds appropriate and up to date  Call when due for controlled medication refills    Recheck recommended with new provider in 2 months to establish care and medication refills

## 2019-06-07 NOTE — TELEPHONE ENCOUNTER
----- Message from Francisca Fong MD sent at 6/7/2019  3:45 PM CDT -----  Lipid panel has improved - continue with current treatment.

## 2019-06-08 LAB
25(OH)D3 SERPL-MCNC: 19.7 NG/ML (ref 30–100)
HBA1C MFR BLD: 8.2 % (ref 4.8–5.6)
TSH SERPL DL<=0.05 MIU/L-ACNC: 3.54 MIU/ML (ref 0.27–4.2)
VIT B12 BLD-MCNC: >2000 PG/ML (ref 211–946)

## 2019-06-17 ENCOUNTER — HOSPITAL ENCOUNTER (OUTPATIENT)
Dept: PHYSICAL THERAPY | Facility: HOSPITAL | Age: 59
Setting detail: THERAPIES SERIES
Discharge: HOME | End: 2019-06-17

## 2019-06-17 ENCOUNTER — TELEPHONE (OUTPATIENT)
Dept: FAMILY MEDICINE CLINIC | Facility: CLINIC | Age: 59
End: 2019-06-17

## 2019-06-17 DIAGNOSIS — Q66.70 PES CAVUS: ICD-10-CM

## 2019-06-17 DIAGNOSIS — R26.81 UNSTEADY GAIT: Primary | ICD-10-CM

## 2019-06-17 DIAGNOSIS — S92.355S CLOSED NONDISPLACED FRACTURE OF FIFTH METATARSAL BONE OF LEFT FOOT, SEQUELA: ICD-10-CM

## 2019-06-17 PROCEDURE — 97110 THERAPEUTIC EXERCISES: CPT

## 2019-06-17 NOTE — TELEPHONE ENCOUNTER
She called to get her lab results-said she was out of town last week.She can be reached at [phone]-said is ok to leave message on [employer] machine.She really wants to find out about AIC.

## 2019-06-17 NOTE — THERAPY TREATMENT NOTE
Outpatient Physical Therapy Ortho Treatment Note  [facility]     Patient Name: Ariana Martinez  : 1960  MRN: [id number]  Today's Date: 2019      Visit Date: 2019   Attendance:   Subjective improvement: 30%  Recert: 19  MD Appointment: 19 @ 8:45      Visit Dx:    ICD-10-CM ICD-9-CM   1. Unsteady gait R26.81 781.2   2. Pes cavus Q66.7 736.73   3. Closed nondisplaced fracture of fifth metatarsal bone of left foot, sequela S92.355S 905.4       Patient Active Problem List   Diagnosis   • 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, with long-term current use of insulin (CMS/HCC)   • Closed nondisplaced fracture of fifth left metatarsal bone   • MAURICIO (generalized anxiety disorder)   • Depression, major, recurrent, moderate (CMS/HCC)   • GERD without esophagitis   • Long term prescription opiate use   • Mixed hyperlipidemia   • Vitamin D deficiency   • Seasonal allergic rhinitis   • Restrictive lung disease secondary to obesity   • Snoring   • Class 3 severe obesity due to excess calories without serious comorbidity with body mass index (BMI) of 40.0 to 44.9 in adult (CMS/HCC)   • (HFpEF) heart failure with preserved ejection fraction (CMS/HCC)   • Diabetic peripheral neuropathy associated with type 2 diabetes mellitus (CMS/HCC)   • Cyanocobalamin deficiency   • CAD (coronary artery disease)   • Hypertension   • Meniere's disease   • Gastroparesis   • Otitis media with effusion, left   • Pulmonary hypertension (CMS/HCC)   • Displaced fracture of fifth metatarsal bone, left foot, subsequent encounter for fracture with nonunion   • Pes cavus   • Primary osteoarthritis involving multiple joints   • Generalized anxiety disorder   • Chronic right-sided low back pain with right-sided sciatica        Past Medical History:   Diagnosis Date   • Angina, class IV (CMS/HCC)    • Anxiety    • Benign paroxysmal positional vertigo    • Carpal tunnel syndrome    •  Chronic pain    • Coronary atherosclerosis     hx CABG 2005.  is followed by Dr Kwon   • Depression    • Diabetes mellitus (CMS/HCC)     Type 2, controlled   • Diabetic polyneuropathy (CMS/HCC)    • Elevated cholesterol    • Female stress incontinence    • Gastroparesis    • GERD (gastroesophageal reflux disease)    • Hyperlipidemia    • Hypertension    • Low back pain    • Malaise and fatigue    • Multiple joint pain    • Obesity     Refuses to be weighed   • Otalgia     Both   • Perforation of tympanic membrane     Left   • Postoperative wound infection    • Vitamin D deficiency         Past Surgical History:   Procedure Laterality Date   • ABDOMINAL SURGERY     • ANGIOPLASTY      coronary   • BREAST BIOPSY Right    • CARDIAC CATHETERIZATION     • CARDIAC CATHETERIZATION N/A 6/20/2017    Procedure: Right Heart Cath;  Surgeon: Can Kwon MD PhD;  Location: [facility] CATH INVASIVE LOCATION;  Service:    • CARPAL TUNNEL RELEASE     • CHOLECYSTECTOMY     • CORONARY ARTERY BYPASS GRAFT  2005   • ENDOSCOPY N/A 10/19/2018    Procedure: ESOPHAGOGASTRODUODENOSCOPY possible dilation;  Surgeon: Julián Maldonado MD;  Location: [facility] ENDOSCOPY;  Service: Gastroenterology   • ENDOSCOPY AND COLONOSCOPY     • FOOT SURGERY      Toes   • GASTRIC BANDING      Revision, laparoscopic   • HYSTERECTOMY     • MOUTH SURGERY     • SALPINGO OOPHORECTOMY     • SHOULDER SURGERY     • SUBTALAR ARTHRODESIS Left 1/16/2019    Procedure: LEFT FOOT HARDWARE REMOVAL, FIFTH METATARSAL , OPEN REDUCTION INTERNAL FIXATION, CALCANEAL OSTEOTOMY;  Surgeon: Ignacio Lord DPM;  Location: [facility] OR;  Service: Podiatry   • TRANSESOPHAGEAL ECHOCARDIOGRAM (LAMONTE)      With color flow       PT Ortho     Row Name 06/17/19 0800       Subjective Comments    Subjective Comments  Pt reports she did alot of walking while on vacation to [facility]. Pt states she walked alot on sand without shoes as well, which made her sore. Pt reports she is still sore  today from all the walking.  Pt states she didnt complete her HEP over vacation.   -EM       Subjective Pain    Post-Treatment Pain Level  3  -EM       Posture/Observations    Posture/Observations Comments  Mild TTP L achilles tendon. Amb Ind with mild antalgic gt.  -EM      User Key  (r) = Recorded By, (t) = Taken By, (c) = Cosigned By    Initials Name Provider Type    Barry Bhatt PTA Physical Therapy Assistant                      PT Assessment/Plan     Row Name 06/17/19 0800          PT Assessment    Functional Limitations  Impaired gait;Limitation in home management;Limitations in community activities  -EM     Impairments  Gait;Pain;Muscle strength;Range of motion  -EM     Assessment Comments  Pt becki tx well with reinitiation of standing therex with minimal pain increase. Pt becki the initiation of balance activities well. Pt unable to perform SLS, but was able to complete tandem stance on airex.  Pt had improved pain post tx. No goals met this tx.   -EM     Rehab Potential  Good  -EM     Patient/caregiver participated in establishment of treatment plan and goals  Yes  -EM     Patient would benefit from skilled therapy intervention  Yes  -EM        PT Plan    PT Frequency  2x/week  -EM     Predicted Duration of Therapy Intervention (Therapy Eval)  4 wks  -EM     PT Plan Comments  Recert next tx, MD note next tx-Pt has MD appt at 8:45.  (Significant)   -EM       User Key  (r) = Recorded By, (t) = Taken By, (c) = Cosigned By    Initials Name Provider Type    Barry Bhatt PTA Physical Therapy Assistant          Modalities     Row Name 06/17/19 0800             Ice    Ice Applied  Yes Ice Massage  -EM      Location  L heel  -EM      Rx Minutes  Other: 5'  -EM      Ice S/P Rx  Yes  -EM        User Key  (r) = Recorded By, (t) = Taken By, (c) = Cosigned By    Initials Name Provider Type    Barry Bhatt PTA Physical Therapy Assistant        Exercises     Row Name 06/17/19 0800             Subjective  "Comments    Subjective Comments  Pt reports she did alot of walking while on vacation to [place]. Pt states she walked alot on sand without shoes as well, which made her sore. Pt reports she is still sore today from all the walking.  Pt states she didnt complete her HEP over vacation.   -EM         Subjective Pain    Able to rate subjective pain?  yes  -EM      Pre-Treatment Pain Level  4  -EM      Post-Treatment Pain Level  3  -EM         Exercise 1    Exercise Name 1  PRO II-Interval Set-4.0  -EM      Time 1  10'  -EM         Exercise 2    Exercise Name 2  Incline Calf S-Setting 4  -EM      Sets 2  3  -EM      Time 2  30\"  -EM         Exercise 3    Exercise Name 3  St CR/TR  -EM      Sets 3  2  -EM      Reps 3  10  -EM         Exercise 4    Exercise Name 4  Fwd BOSU Step Uo  -EM      Sets 4  2  -EM      Reps 4  10  -EM         Exercise 5    Exercise Name 5  L SLS   -EM      Additional Comments  Pt unable to stand L SLS longer than 3\"  -EM         Exercise 6    Exercise Name 6  Tandem Stance-L LE fwd  -EM      Sets 6  1  -EM      Reps 6  3  -EM      Additional Comments  11\", 35\", 10\"  -EM         Exercise 7    Exercise Name 7  Tandem Stance-R LE Fwd  -EM      Sets 7  1  -EM      Reps 7  3  -EM      Additional Comments  7\",7\", 12\"  -EM         Exercise 8    Exercise Name 8  Ankle ABC  -EM      Sets 8  2  -EM      Reps 8  A-Z  -EM         Exercise 9    Exercise Name 9  Ankle Circles: CW, CCW  -EM      Sets 9  1  -EM      Reps 9  30  -EM         Exercise 10    Exercise Name 10  4 Way Ankle TB  -EM      Sets 10  1  -EM      Reps 10  30  -EM      Additional Comments  Red  -EM         Exercise 11    Exercise Name 11  PROM  -EM      Time 11  9'  -EM         Exercise 12    Exercise Name 12  Ice Massage  -EM      Time 12  5'  -EM        User Key  (r) = Recorded By, (t) = Taken By, (c) = Cosigned By    Initials Name Provider Type    EM Barry Gamboa, PTA Physical Therapy Assistant                       PT OP Goals     Row " Name 06/17/19 0800          PT Short Term Goals    STG Date to Achieve  06/11/19  -EM     STG 1  Patient will be independent home exercise program  -EM     STG 1 Progress  Met  (Significant)   -EM     STG 2  Patient will have dorsiflexion 7 degrees  -EM     STG 2 Progress  Not Met  -EM     STG 3  Patient plantarflexion 35 degrees  -EM     STG 3 Progress  Not Met  -EM     STG 4  Patient will have eversion 15 degrees  -EM     STG 4 Progress  Not Met  -EM     STG 5  Patient will have inversion 25 degrees  -EM     STG 5 Progress  Met  (Significant)   -EM        Long Term Goals    LTG 1  Patient had minimal tenderness to palpation of the posterior calcaneus  -EM     LTG 1 Progress  Not Met  -EM     LTG 2  Patient has minimal tenderness to palpation of the ATF  -EM     LTG 2 Progress  Not Met  -EM     LTG 3  Patient to have single leg stance on the left to 6 seconds  -EM     LTG 3 Progress  Not Met  -EM        Time Calculation    PT Goal Re-Cert Due Date  06/11/19  -EM       User Key  (r) = Recorded By, (t) = Taken By, (c) = Cosigned By    Initials Name Provider Type    EM Barry Gamboa, MANJINDER Physical Therapy Assistant                         Time Calculation:   Start Time: 0800  Stop Time: 0903  Time Calculation (min): 63 min  PT Non-Billable Time (min): 5 min  Total Timed Code Minutes- PT: 60 minute(s)  Therapy Charges for Today     Code Description Service Date Service Provider Modifiers Qty    31268142306  PT THER PROC EA 15 MIN 6/17/2019 Barry Gamboa PTA GP 4                    Barry Gamboa PTA  6/17/2019

## 2019-06-18 ENCOUNTER — PROCEDURE VISIT (OUTPATIENT)
Dept: CARDIOLOGY | Facility: CLINIC | Age: 59
End: 2019-06-18

## 2019-06-18 DIAGNOSIS — I27.20 PULMONARY HYPERTENSION (HCC): ICD-10-CM

## 2019-06-18 PROCEDURE — 94618 PULMONARY STRESS TESTING: CPT | Performed by: INTERNAL MEDICINE

## 2019-06-18 NOTE — PROGRESS NOTES
Ariana Martinez  Procedure: 6 Minute Walk Test   Indication:pulmonary hypertension    Pretest: BP:126/74               HR:91               Sa02:96               Dyspnea:0.5               Fatigue:2    Post Test: BP:146/72               HR:118               Sa02:96               Dyspnea:2               Fatigue:2    First 6MWT:5/2/17    Supplemental oxygen during test:no    Stopped before 6 minutes:no    Pauses:none    Results in distance walked:316.46 m    Did individual experience any pain or discomfort:legs    Attestation:  I was immediately available for the above test and agree with the data.     NYHA Functional Class II.    Can Kwon MD PhD      -----------------------------------------------------------------------------------------------------------------  [facility] performs 6MWT to the ATS guidelines for 6MWT (2002). Predicted distance is from:      -------------------------------------------------------------------------------------------------------------

## 2019-06-19 RX ORDER — PEN NEEDLE, DIABETIC 31 GX5/16"
NEEDLE, DISPOSABLE MISCELLANEOUS
Qty: 150 EACH | Refills: 0 | Status: SHIPPED | OUTPATIENT
Start: 2019-06-19 | End: 2019-07-17 | Stop reason: SDUPTHER

## 2019-06-20 NOTE — TELEPHONE ENCOUNTER
Called patient regarding labs results, but she wasn't home.  Left a message for patient to return call.

## 2019-06-20 NOTE — TELEPHONE ENCOUNTER
Patient returned my call & I gave her lab results.  She is already taking Vitamin D 50,000 weekly.  Please advise.    Thanks You!    Kathy

## 2019-06-21 ENCOUNTER — APPOINTMENT (OUTPATIENT)
Dept: PHYSICAL THERAPY | Facility: HOSPITAL | Age: 59
End: 2019-06-21

## 2019-06-21 RX ORDER — VENLAFAXINE HYDROCHLORIDE 150 MG/1
CAPSULE, EXTENDED RELEASE ORAL
Qty: 30 CAPSULE | Refills: 0 | Status: SHIPPED | OUTPATIENT
Start: 2019-06-21 | End: 2019-06-28 | Stop reason: SDUPTHER

## 2019-06-25 ENCOUNTER — TELEPHONE (OUTPATIENT)
Dept: ENDOCRINOLOGY | Facility: CLINIC | Age: 59
End: 2019-06-25

## 2019-06-25 ENCOUNTER — OFFICE VISIT (OUTPATIENT)
Dept: PODIATRY | Facility: CLINIC | Age: 59
End: 2019-06-25

## 2019-06-25 ENCOUNTER — HOSPITAL ENCOUNTER (OUTPATIENT)
Dept: PHYSICAL THERAPY | Facility: HOSPITAL | Age: 59
Setting detail: THERAPIES SERIES
Discharge: HOME | End: 2019-06-25

## 2019-06-25 VITALS — HEIGHT: 68 IN | WEIGHT: 267 LBS | BODY MASS INDEX: 40.47 KG/M2

## 2019-06-25 DIAGNOSIS — S92.355S CLOSED NONDISPLACED FRACTURE OF FIFTH METATARSAL BONE OF LEFT FOOT, SEQUELA: ICD-10-CM

## 2019-06-25 DIAGNOSIS — Q66.70 PES CAVUS: ICD-10-CM

## 2019-06-25 DIAGNOSIS — S92.352K DISPLACED FRACTURE OF FIFTH METATARSAL BONE, LEFT FOOT, SUBSEQUENT ENCOUNTER FOR FRACTURE WITH NONUNION: Primary | ICD-10-CM

## 2019-06-25 DIAGNOSIS — R26.81 GAIT INSTABILITY: ICD-10-CM

## 2019-06-25 DIAGNOSIS — R26.81 UNSTEADY GAIT: Primary | ICD-10-CM

## 2019-06-25 DIAGNOSIS — M79.672 LEFT FOOT PAIN: ICD-10-CM

## 2019-06-25 PROCEDURE — 97110 THERAPEUTIC EXERCISES: CPT | Performed by: PHYSICAL THERAPIST

## 2019-06-25 PROCEDURE — 99213 OFFICE O/P EST LOW 20 MIN: CPT | Performed by: PODIATRIST

## 2019-06-25 NOTE — PROGRESS NOTES
Ariana Luis Martinez  1960  59 y.o. female   WILMAR Fong - 06/07/19  BS - 131 this morning per patient     Patient presents today for a follow up on the left foot.    06/25/2019        Chief Complaint   Patient presents with   • Left Foot - Follow-up       History of Present Illness    Ms Martinez is a 58 y/o diabetic female who presents for f/u left foot revision fifth metatarsal fracture nonunion and Rich calcaneal osteotomy.  Date of surgery 1/16/2019.   In her postoperative course she did fall on postoperative week 1 causing an avulsion fracture of her calcaneal osteotomy.  We treated this conservatively and monitoring with serial x-rays.  She has returned to regular shoes full-time.  She has been in physical therapy since last visit.  Her pain is improved however she does still feel fairly unsteady on her gait and has difficulty balancing on her surgical limb.    Past Medical History:   Diagnosis Date   • Angina, class IV (CMS/HCC)    • Anxiety    • Benign paroxysmal positional vertigo    • Carpal tunnel syndrome    • Chronic pain    • Coronary atherosclerosis     hx CABG 2005.  is followed by Dr Kwon   • Depression    • Diabetes mellitus (CMS/HCC)     Type 2, controlled   • Diabetic polyneuropathy (CMS/HCC)    • Elevated cholesterol    • Female stress incontinence    • Gastroparesis    • GERD (gastroesophageal reflux disease)    • Hyperlipidemia    • Hypertension    • Low back pain    • Malaise and fatigue    • Multiple joint pain    • Obesity     Refuses to be weighed   • Otalgia     Both   • Perforation of tympanic membrane     Left   • Postoperative wound infection    • Vitamin D deficiency          Past Surgical History:   Procedure Laterality Date   • ABDOMINAL SURGERY     • ANGIOPLASTY      coronary   • BREAST BIOPSY Right    • CARDIAC CATHETERIZATION     • CARDIAC CATHETERIZATION N/A 6/20/2017    Procedure: Right Heart Cath;  Surgeon: Can Kwon MD PhD;  Location: [facility] INVASIVE  LOCATION;  Service:    • CARPAL TUNNEL RELEASE     • CHOLECYSTECTOMY     • CORONARY ARTERY BYPASS GRAFT  2005   • ENDOSCOPY N/A 10/19/2018    Procedure: ESOPHAGOGASTRODUODENOSCOPY possible dilation;  Surgeon: Julián Maldonado MD;  Location: [facility] ENDOSCOPY;  Service: Gastroenterology   • ENDOSCOPY AND COLONOSCOPY     • FOOT SURGERY      Toes   • GASTRIC BANDING      Revision, laparoscopic   • HYSTERECTOMY     • MOUTH SURGERY     • SALPINGO OOPHORECTOMY     • SHOULDER SURGERY     • SUBTALAR ARTHRODESIS Left 1/16/2019    Procedure: LEFT FOOT HARDWARE REMOVAL, FIFTH METATARSAL , OPEN REDUCTION INTERNAL FIXATION, CALCANEAL OSTEOTOMY;  Surgeon: Ignacio Lord DPM;  Location: [facility] OR;  Service: Podiatry   • TRANSESOPHAGEAL ECHOCARDIOGRAM (LAMONTE)      With color flow         Family History   Problem Relation Age of Onset   • Diabetes Other    • Heart disease Other    • Hypertension Other    • Heart disease Mother    • Stroke Mother    • Hypertension Mother    • Diabetes Sister    • Heart disease Sister    • Hypertension Sister    • Heart disease Sister    • Diabetes Sister    • Hypertension Sister    • Diabetes Sister    • Diabetes Sister    • Diabetes Sister    • Diabetes Sister          Social History     Socioeconomic History   • Marital status:      Spouse name: Not on file   • Number of children: Not on file   • Years of education: Not on file   • Highest education level: Not on file   Tobacco Use   • Smoking status: Never Smoker   • Smokeless tobacco: Never Used   Substance and Sexual Activity   • Alcohol use: No   • Drug use: No   • Sexual activity: Defer         Current Outpatient Medications   Medication Sig Dispense Refill   • ARIPiprazole (ABILIFY) 15 MG tablet Take 1 tablet by mouth Daily. 90 tablet 1   • aspirin 81 MG chewable tablet Chew 81 mg daily.     • atorvastatin (LIPITOR) 40 MG tablet Take 1 tablet by mouth Every Night. 90 tablet 1   • B-D ULTRAFINE III SHORT PEN 31G X 8 MM misc INJECT 1  EACH INTO THE SHOULDER, THIGH, OR BUTTOCKS 4 (FOUR) TIMES A DAY. USE AS DIRECTED 150 each 0   • BD SHARPS CONTAINER HOME misc 1 each Take As Directed. 1 each 0   • Blood Glucose Monitoring Suppl (ONE TOUCH ULTRA MINI) w/Device kit USE AS DIRECTED TO CHECK BLOOD SUGAR 1 each 0   • Calcium Citrate-Vitamin D (CITRACAL/VITAMIN D) 250-200 MG-UNIT tablet Take 2 tablets by mouth 2 (two) times a day.     • clopidogrel (PLAVIX) 75 MG tablet Take 75 mg by mouth Daily.     • cyanocobalamin 1000 MCG/ML injection Inject 1 mL into the appropriate muscle as directed by prescriber Every 28 (Twenty-Eight) Days. 1 mL 0   • furosemide (LASIX) 40 MG tablet Take 1 tablet by mouth Daily. 90 tablet 1   • gabapentin (NEURONTIN) 400 MG capsule TAKE 1 CAPSULE BY MOUTH THREE TIMES A DAY 90 capsule 2   • gabapentin (NEURONTIN) 400 MG capsule Take 1 capsule by mouth 3 (Three) Times a Day. Fill when due 90 capsule 0   • GLUCAGON EMERGENCY 1 MG injection USE AS DIRECTED AS NEEDED 1 kit 0   • glucose blood (ONE TOUCH ULTRA TEST) test strip 1 each by Other route 4 (Four) Times a Day. Use as instructed 400 each 1   • hydrochlorothiazide (MICROZIDE) 12.5 MG capsule TAKE 1 CAPSULE BY MOUTH DAILY. 90 capsule 0   • HYDROcodone-acetaminophen (NORCO) 7.5-325 MG per tablet Take 1 tablet by mouth Every 4 (Four) Hours As Needed for Moderate Pain  or Severe Pain . Up to 5 times per day- fill when due 180 tablet 0   • insulin aspart (novoLOG FLEXPEN) 100 UNIT/ML solution pen-injector sc pen Inject 60 Units under the skin into the appropriate area as directed 3 (Three) Times a Day With Meals.     • insulin aspart (NOVOLOG FLEXPEN) 100 UNIT/ML solution pen-injector sc pen INJECT 60 UNITS BEFORE MEALS 3 TIMES A DAY 5 pen 5   • Insulin Glargine (BASAGLAR KWIKPEN) 100 UNIT/ML injection pen Inject 100 Units under the skin into the appropriate area as directed Every Night. 5 pen 5   • LORazepam (ATIVAN) 0.5 MG tablet Take 1 tablet by mouth Daily As Needed for  "Anxiety. Fill when due 30 tablet 0   • meclizine (ANTIVERT) 25 MG tablet Take 1 tablet by mouth 3 (Three) Times a Day As Needed for dizziness. 90 tablet 6   • meloxicam (MOBIC) 15 MG tablet Take 1 tablet by mouth Daily. Take once daily. 30 tablet 0   • metoclopramide (REGLAN) 10 MG tablet Take 10 mg by mouth 4 (Four) Times a Day As Needed.     • metoprolol succinate XL (TOPROL-XL) 25 MG 24 hr tablet TAKE 1 TABLET BY MOUTH DAILY. 31 tablet 6   • mirtazapine (REMERON) 45 MG tablet TAKE 1 TABLET BY MOUTH EVERY NIGHT. 90 tablet 1   • omeprazole (PRILOSEC) 40 MG capsule Take 1 capsule by mouth Daily. 90 capsule 1   • ONE TOUCH ULTRA TEST test strip USE TO TEST SUGAR 4 TIMES A  each 3   • phenazopyridine (PYRIDIUM) 200 MG tablet Take 1 tablet by mouth 3 (Three) Times a Day As Needed for bladder spasms. 21 tablet 0   • SANTYL 250 UNIT/GM ointment APPLY TO AFFECTED AREA DAILY IN NICKEL INCH THICKNESS  5   • Syringe/Needle, Disp, (LUER LOCK SAFETY SYRINGES) 25G X 5/8\" 3 ML misc 1 each Daily. 100 each 0   • venlafaxine XR (EFFEXOR-XR) 150 MG 24 hr capsule TAKE ONE CAPSULE BY MOUTH TWICE A DAY FOR DEPRESSION 30 capsule 0   • vitamin D (ERGOCALCIFEROL) 94628 units capsule capsule Take 50,000 Units by mouth 1 (One) Time Per Week. sunday     • vitamin D (ERGOCALCIFEROL) 52813 units capsule capsule TAKE ONE CAPSULE BY MOUTH EVERY SUNDAY 12 capsule 2     No current facility-administered medications for this visit.      Review of Systems   Constitutional: Negative.    HENT: Negative.    Respiratory: Negative.    Musculoskeletal:        Left foot pain    Neurological: Positive for numbness.   Psychiatric/Behavioral: Negative.          OBJECTIVE    Ht 172.7 cm (68\")   Wt 121 kg (267 lb)   BMI 40.60 kg/m²         Physical Exam   Constitutional: she appears well-developed and well-nourished.   Psychiatric: she has a normal mood and affect. her   behavior is normal.      Lower Extremity Exam:  Vascular: DP pulses palpable 2+. PT " not readily palpable. +signal on doppler  Negative hair growth.   Mild left foot edema  Negative Conrad  Neuro: Protective sensation absent to foot, b/l. Reestablished at mid calf   DTRs intact  Vibratory sensation diminished.  Integument: Left foot incisions well healed.  No signs of infection  No skin tenting is noted to the left heel  Right lateral malleolus ulceration healed  Diffuse xerosis b/l.  Webspaces c/d/i  Musculoskeletal: Left ankle range of motion intact  No tenderness to palpation of left lateral 5th metatarsal.    Mild tenderness to lateral calcaneal body  Achilles tendon intact.         Procedures        ASSESSMENT AND PLAN    Ariana was seen today for follow-up.    Diagnoses and all orders for this visit:    Displaced fracture of fifth metatarsal bone, left foot, subsequent encounter for fracture with nonunion  -     XR Foot 3+ View Left    Left foot pain  -     XR Calcaneus 2+ View Left        -Patient is doing well overall spite mild residual pain and instability.  -Radiographs ordered and reviewed.  Calcaneal osteotomy/fracture is healed  -Continue physical therapy for strengthening and gait training  -Did briefly discussed possibility of future calcaneal exostectomy versus revision to subtalar joint arthrodesis if necessary in the future.  We will try to avoid if possible.  -Recheck 4 weeks          This document has been electronically signed by Maira Epstein MA on June 25, 2019 9:16 AM

## 2019-06-25 NOTE — TELEPHONE ENCOUNTER
----- Message from BEBE Prince sent at 6/24/2019  4:32 PM CDT -----  A1c was 8.2 , vitamin d low be sure taking the vitmain d    Thyroid normal  ----- Message -----  From: Zulay Riggs MA  Sent: 6/24/2019   2:22 PM  To: BEBE Prince    Pt is calling for labs.

## 2019-06-25 NOTE — THERAPY PROGRESS REPORT/RE-CERT
Outpatient Physical Therapy Ortho Progress Note  [facility]     Patient Name: Ariana Martinez  : 1960  MRN: [id number]  Today's Date: 2019      Visit Date: 2019/ (90).  MD   none        Recert 7/15  Visit Dx:    ICD-10-CM ICD-9-CM   1. Unsteady gait R26.81 781.2   2. Pes cavus Q66.7 736.73   3. Closed nondisplaced fracture of fifth metatarsal bone of left foot, sequela S92.355S 905.4       Patient Active Problem List   Diagnosis   • 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, with long-term current use of insulin (CMS/HCC)   • Closed nondisplaced fracture of fifth left metatarsal bone   • MAURICIO (generalized anxiety disorder)   • Depression, major, recurrent, moderate (CMS/HCC)   • GERD without esophagitis   • Long term prescription opiate use   • Mixed hyperlipidemia   • Vitamin D deficiency   • Seasonal allergic rhinitis   • Restrictive lung disease secondary to obesity   • Snoring   • Class 3 severe obesity due to excess calories without serious comorbidity with body mass index (BMI) of 40.0 to 44.9 in adult (CMS/HCC)   • (HFpEF) heart failure with preserved ejection fraction (CMS/HCC)   • Diabetic peripheral neuropathy associated with type 2 diabetes mellitus (CMS/HCC)   • Cyanocobalamin deficiency   • CAD (coronary artery disease)   • Hypertension   • Meniere's disease   • Gastroparesis   • Otitis media with effusion, left   • Pulmonary hypertension (CMS/HCC)   • Displaced fracture of fifth metatarsal bone, left foot, subsequent encounter for fracture with nonunion   • Pes cavus   • Primary osteoarthritis involving multiple joints   • Generalized anxiety disorder   • Chronic right-sided low back pain with right-sided sciatica        Past Medical History:   Diagnosis Date   • Angina, class IV (CMS/HCC)    • Anxiety    • Benign paroxysmal positional vertigo    • Carpal tunnel syndrome    • Chronic pain    • Coronary atherosclerosis     hx CABG .  is  followed by Dr Kwon   • Depression    • Diabetes mellitus (CMS/[facility])     Type 2, controlled   • Diabetic polyneuropathy (CMS/[facility])    • Elevated cholesterol    • Female stress incontinence    • Gastroparesis    • GERD (gastroesophageal reflux disease)    • Hyperlipidemia    • Hypertension    • Low back pain    • Malaise and fatigue    • Multiple joint pain    • Obesity     Refuses to be weighed   • Otalgia     Both   • Perforation of tympanic membrane     Left   • Postoperative wound infection    • Vitamin D deficiency         Past Surgical History:   Procedure Laterality Date   • ABDOMINAL SURGERY     • ANGIOPLASTY      coronary   • BREAST BIOPSY Right    • CARDIAC CATHETERIZATION     • CARDIAC CATHETERIZATION N/A 6/20/2017    Procedure: Right Heart Cath;  Surgeon: Can Kwon MD PhD;  Location: [facility] CATH INVASIVE LOCATION;  Service:    • CARPAL TUNNEL RELEASE     • CHOLECYSTECTOMY     • CORONARY ARTERY BYPASS GRAFT  2005   • ENDOSCOPY N/A 10/19/2018    Procedure: ESOPHAGOGASTRODUODENOSCOPY possible dilation;  Surgeon: Julián Maldonado MD;  Location: [facility] ENDOSCOPY;  Service: Gastroenterology   • ENDOSCOPY AND COLONOSCOPY     • FOOT SURGERY      Toes   • GASTRIC BANDING      Revision, laparoscopic   • HYSTERECTOMY     • MOUTH SURGERY     • SALPINGO OOPHORECTOMY     • SHOULDER SURGERY     • SUBTALAR ARTHRODESIS Left 1/16/2019    Procedure: LEFT FOOT HARDWARE REMOVAL, FIFTH METATARSAL , OPEN REDUCTION INTERNAL FIXATION, CALCANEAL OSTEOTOMY;  Surgeon: Ignacio Lord DPM;  Location: [facility] OR;  Service: Podiatry   • TRANSESOPHAGEAL ECHOCARDIOGRAM (LAMONTE)      With color flow       PT Ortho     Row Name 06/25/19 0800       Special Tests/Palpation    Special Tests/Palpation  -- L calcaneus TTP  -DD       General ROM    GENERAL ROM COMMENTS  DF 4, PF 30, inv 30, ev 10  -DD       MMT (Manual Muscle Testing)    General MMT Comments  PF 4+, DF4, INV 4+, EV 4  -DD      User Key  (r) = Recorded By, (t) =  Taken By, (c) = Cosigned By    Initials Name Provider Type    Lucero Stahl, PT DPT Physical Therapist          PT Assessment/Plan     Row Name 06/25/19 0842          PT Assessment    Functional Limitations  Decreased safety during functional activities;Impaired gait;Limitation in home management;Performance in leisure activities;Limitations in community activities  -DD     Impairments  Endurance;Gait;Balance;Range of motion;Muscle strength  -DD     Assessment Comments  Pt has poor balance, and will remain limited in mobility with DF and eversion due to the nature of the surgery.  Progress strength and balance.  -DD     Please refer to paper survey for additional self-reported information  Yes  -DD     Rehab Potential  Good  -DD     Patient/caregiver participated in establishment of treatment plan and goals  Yes  -DD     Patient would benefit from skilled therapy intervention  Yes  -DD        PT Plan    PT Frequency  2x/week  -DD     Planned CPT's?  PT EVAL LOW COMPLEXITY: 31015;PT GAIT TRAINING EA 15 MIN: 85818  -DD       User Key  (r) = Recorded By, (t) = Taken By, (c) = Cosigned By    Initials Name Provider Type    Lucero Stahl, PT DPT Physical Therapist         2-3 weeks.    Continue gait/balance activites.  Ankle strengthening.      Modalities     Row Name 06/25/19 0800             Ice    Patient reports will apply ice at home to involved area  Yes MD appt 8:45  -DD        User Key  (r) = Recorded By, (t) = Taken By, (c) = Cosigned By    Initials Name Provider Type    Lucero Stahl, PT DPT Physical Therapist        Exercises     Row Name 06/25/19 0800             Subjective Comments    Subjective Comments  States she is not hurting this morning.  -DD         Subjective Pain    Able to rate subjective pain?  yes  -DD      Pre-Treatment Pain Level  0  -DD      Post-Treatment Pain Level  3  -DD         Exercise 1    Exercise Name 1  PRO II-Interval Set-4.0  -DD      Time 1  10'  " -DD         Exercise 2    Exercise Name 2  Incline Calf S-Setting 4  -DD      Sets 2  3  -DD      Time 2  30\"  -DD         Exercise 3    Exercise Name 3  St CR/TR  -DD      Sets 3  2  -DD      Reps 3  10  -DD         Exercise 4    Exercise Name 4  SLS  -DD      Time 4  2'  -DD      Additional Comments  best Left 3\"  -DD         Exercise 5    Exercise Name 5  tandem stance  -DD      Time 5  3'  -DD      Additional Comments  best trials r/l 27\" L/R 14\"  -DD         Exercise 6    Exercise Name 6  PROM/ stetches  -DD      Time 6  6'  -DD         Exercise 7    Exercise Name 7  4 way ankle tband  -DD      Reps 7  20 each  -DD      Additional Comments  red  -DD        User Key  (r) = Recorded By, (t) = Taken By, (c) = Cosigned By    Initials Name Provider Type    Lucero Stahl, PT DPT Physical Therapist            PT OP Goals     Row Name 06/25/19 1056 06/25/19 0800       PT Short Term Goals    STG Date to Achieve  --  06/11/19  -DD    STG 1  --  Patient will be independent home exercise program  -DD    STG 1 Progress  --  Met  -DD    STG 2  --  Patient will have dorsiflexion 7 degrees  -DD    STG 2 Progress  --  Progressing  -DD    STG 2 Progress Comments  --  4  -DD    STG 3  --  Patient plantarflexion 35 degrees  -DD    STG 3 Progress  --  Progressing  -DD    STG 3 Progress Comments  --  30  -DD    STG 4  --  Patient will have eversion 15 degrees  -DD    STG 4 Progress  --  Ongoing  -DD    STG 4 Progress Comments  --  10  -DD    STG 5  --  Patient will have inversion 25 degrees  -DD    STG 5 Progress  --  Met  -DD       Long Term Goals    LTG 1  --  Patient had minimal tenderness to palpation of the posterior calcaneus  -DD    LTG 1 Progress  --  Ongoing  -DD    LTG 2  --  Patient has minimal tenderness to palpation of the ATF  -DD    LTG 2 Progress  --  Met  -DD    LTG 3  --  Patient to have single leg stance on the left to 6 seconds  -DD    LTG 3 Progress  --  Ongoing  -DD    LTG 3 Progress Comments  --  " "3\"  -DD       Time Calculation    PT Goal Re-Cert Due Date  07/16/19  -DD  --      User Key  (r) = Recorded By, (t) = Taken By, (c) = Cosigned By    Initials Name Provider Type    Lucero Stahl PT DPT Physical Therapist          Outcome Measure Options: Lower Extremity Functional Scale (LEFS)  Lower Extremity Functional Index  Any of your usual work, housework or school activities: A little bit of difficulty  Your usual hobbies, recreational or sporting activities: Moderate difficulty  Getting into or out of the bath: Moderate difficulty  Walking between rooms: No difficulty  Putting on your shoes or socks: No difficulty  Squatting: Moderate difficulty  Lifting an object, like a bag of groceries from the floor: No difficulty  Performing light activities around your home: A little bit of difficulty  Performing heavy activities around your home: Moderate difficulty  Getting into or out of a car: No difficulty  Walking 2 blocks: Moderate difficulty  Walking a mile: Quite a bit of difficulty  Going up or down 10 stairs (about 1 flight of stairs): A little bit of difficulty  Standing for 1 hour: Moderate difficulty  Sitting for 1 hour: No difficulty  Running on even ground: Moderate difficulty  Running on uneven ground: Moderate difficulty  Making sharp turns while running fast: Moderate difficulty  Hopping: Quite a bit of difficulty  Rolling over in bed: No difficulty  Total: 53      Time Calculation:   Start Time: 0755  Stop Time: 0840  Time Calculation (min): 45 min  Total Timed Code Minutes- PT: 45 minute(s)  Therapy Charges for Today     Code Description Service Date Service Provider Modifiers Qty    [id number] HC PT THER PROC EA 15 MIN 6/25/2019 Lucero Camilo PT DPT GP 3          PT G-Codes  Outcome Measure Options: Lower Extremity Functional Scale (LEFS)  Total: 53         Lucero Camilo PT DPT  6/25/2019     "

## 2019-06-26 ENCOUNTER — HOSPITAL ENCOUNTER (OUTPATIENT)
Dept: PHYSICAL THERAPY | Facility: HOSPITAL | Age: 59
Setting detail: THERAPIES SERIES
Discharge: HOME | End: 2019-06-26

## 2019-06-26 DIAGNOSIS — S92.355S CLOSED NONDISPLACED FRACTURE OF FIFTH METATARSAL BONE OF LEFT FOOT, SEQUELA: ICD-10-CM

## 2019-06-26 DIAGNOSIS — R26.81 UNSTEADY GAIT: Primary | ICD-10-CM

## 2019-06-26 DIAGNOSIS — Q66.70 PES CAVUS: ICD-10-CM

## 2019-06-26 PROCEDURE — 97110 THERAPEUTIC EXERCISES: CPT

## 2019-06-26 NOTE — THERAPY TREATMENT NOTE
Outpatient Physical Therapy Ortho Treatment Note  [facility]     Patient Name: Ariana Martinez  : 1960  MRN: [id number]  Today's Date: 2019      Visit Date: 2019   Attendance:   Subjective improvement:40%  Recert: 7/15/19  MD Appointment: 19      Visit Dx:    ICD-10-CM ICD-9-CM   1. Unsteady gait R26.81 781.2   2. Pes cavus Q66.7 736.73   3. Closed nondisplaced fracture of fifth metatarsal bone of left foot, sequela S92.355S 905.4       Patient Active Problem List   Diagnosis   • 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, with long-term current use of insulin (CMS/[facility])   • Closed nondisplaced fracture of fifth left metatarsal bone   • MAURICIO (generalized anxiety disorder)   • Depression, major, recurrent, moderate (CMS/HCC)   • GERD without esophagitis   • Long term prescription opiate use   • Mixed hyperlipidemia   • Vitamin D deficiency   • Seasonal allergic rhinitis   • Restrictive lung disease secondary to obesity   • Snoring   • Class 3 severe obesity due to excess calories without serious comorbidity with body mass index (BMI) of 40.0 to 44.9 in adult (CMS/HCC)   • (HFpEF) heart failure with preserved ejection fraction (CMS/HCC)   • Diabetic peripheral neuropathy associated with type 2 diabetes mellitus (CMS/[facility])   • Cyanocobalamin deficiency   • CAD (coronary artery disease)   • Hypertension   • Meniere's disease   • Gastroparesis   • Otitis media with effusion, left   • Pulmonary hypertension (CMS/[facility])   • Displaced fracture of fifth metatarsal bone, left foot, subsequent encounter for fracture with nonunion   • Pes cavus   • Primary osteoarthritis involving multiple joints   • Generalized anxiety disorder   • Chronic right-sided low back pain with right-sided sciatica        Past Medical History:   Diagnosis Date   • Angina, class IV (CMS/HCC)    • Anxiety    • Benign paroxysmal positional vertigo    • Carpal tunnel syndrome    • Chronic  pain    • Coronary atherosclerosis     hx CABG 2005.  is followed by Dr Kwon   • Depression    • Diabetes mellitus (CMS/[facility])     Type 2, controlled   • Diabetic polyneuropathy (CMS/[facility])    • Elevated cholesterol    • Female stress incontinence    • Gastroparesis    • GERD (gastroesophageal reflux disease)    • Hyperlipidemia    • Hypertension    • Low back pain    • Malaise and fatigue    • Multiple joint pain    • Obesity     Refuses to be weighed   • Otalgia     Both   • Perforation of tympanic membrane     Left   • Postoperative wound infection    • Vitamin D deficiency         Past Surgical History:   Procedure Laterality Date   • ABDOMINAL SURGERY     • ANGIOPLASTY      coronary   • BREAST BIOPSY Right    • CARDIAC CATHETERIZATION     • CARDIAC CATHETERIZATION N/A 6/20/2017    Procedure: Right Heart Cath;  Surgeon: Can Kwon MD PhD;  Location: [facility] CATH INVASIVE LOCATION;  Service:    • CARPAL TUNNEL RELEASE     • CHOLECYSTECTOMY     • CORONARY ARTERY BYPASS GRAFT  2005   • ENDOSCOPY N/A 10/19/2018    Procedure: ESOPHAGOGASTRODUODENOSCOPY possible dilation;  Surgeon: Julián Maldonado MD;  Location: [facility] ENDOSCOPY;  Service: Gastroenterology   • ENDOSCOPY AND COLONOSCOPY     • FOOT SURGERY      Toes   • GASTRIC BANDING      Revision, laparoscopic   • HYSTERECTOMY     • MOUTH SURGERY     • SALPINGO OOPHORECTOMY     • SHOULDER SURGERY     • SUBTALAR ARTHRODESIS Left 1/16/2019    Procedure: LEFT FOOT HARDWARE REMOVAL, FIFTH METATARSAL , OPEN REDUCTION INTERNAL FIXATION, CALCANEAL OSTEOTOMY;  Surgeon: Ignacio Lord DPM;  Location: [facility] OR;  Service: Podiatry   • TRANSESOPHAGEAL ECHOCARDIOGRAM (LAMONTE)      With color flow       PT Ortho     Row Name 06/26/19 1000       Posture/Observations    Posture/Observations Comments  Amb Ind with mild antalgic gt.  -EM    Row Name 06/25/19 0800       Special Tests/Palpation    Special Tests/Palpation  -- L calcaneus TTP  -DD       General ROM     "GENERAL ROM COMMENTS  DF 4, PF 30, inv 30, ev 10  -DD       MMT (Manual Muscle Testing)    General MMT Comments  PF 4+, DF4, INV 4+, EV 4  -DD      User Key  (r) = Recorded By, (t) = Taken By, (c) = Cosigned By    Initials Name Provider Type    Barry Bhatt PTA Physical Therapy Assistant    DD Lucero Camilo, PT DPT Physical Therapist                      PT Assessment/Plan     Row Name 06/26/19 1100          PT Assessment    Functional Limitations  Decreased safety during functional activities;Impaired gait;Limitation in home management;Performance in leisure activities;Limitations in community activities  -EM     Impairments  Endurance;Gait;Balance;Range of motion;Muscle strength  -EM     Assessment Comments  Pt c/o more brning in heel today, likely due to progression to offstep CR. No goals met this tx. Pt cont to have difficulty with balance activities and will benefit from further PT to improved on these deficits.   -EM     Rehab Potential  Good  -EM     Patient/caregiver participated in establishment of treatment plan and goals  Yes  -EM     Patient would benefit from skilled therapy intervention  Yes  -EM        PT Plan    PT Frequency  2x/week  -EM     Predicted Duration of Therapy Intervention (Therapy Eval)  2-3 wks  -EM     PT Plan Comments  Cont to progress balance as able. Cont ankle strengthening.   -EM       User Key  (r) = Recorded By, (t) = Taken By, (c) = Cosigned By    Initials Name Provider Type    Barry Bhatt PTA Physical Therapy Assistant          Modalities     Row Name 06/26/19 1000             Subjective Pain    Post-Treatment Pain Level  4  -EM        User Key  (r) = Recorded By, (t) = Taken By, (c) = Cosigned By    Initials Name Provider Type    Barry Bhatt PTA Physical Therapy Assistant        Exercises     Row Name 06/26/19 1000             Subjective Comments    Subjective Comments  \"Im hurting today\" Pt feels it could be from the manual stretching yesterday. " "  -EM         Subjective Pain    Able to rate subjective pain?  yes  -EM      Pre-Treatment Pain Level  4  -EM      Post-Treatment Pain Level  4  -EM         Exercise 1    Exercise Name 1  PRO II-Peak-4.0  -EM      Time 1  10'  -EM         Exercise 2    Exercise Name 2  Incline Calf S-Setting 4  -EM      Sets 2  3  -EM      Time 2  30\"  -EM         Exercise 3    Exercise Name 3  Offstep CR  -EM      Sets 3  1  -EM      Reps 3  20  -EM         Exercise 4    Exercise Name 4  St TR  -EM      Sets 4  1  -EM      Reps 4  30  -EM         Exercise 5    Exercise Name 5  Fwd/Lat Step Up on BOSU  -EM      Sets 5  1  -EM      Reps 5  30  -EM         Exercise 6    Exercise Name 6  Tandem Stance: L LE fwd  -EM      Sets 6  1  -EM      Reps 6  3  -EM      Additional Comments  22\", 36\", 5\"  -EM         Exercise 7    Exercise Name 7  Tandem Stance: R LE fwd  -EM      Sets 7  1  -EM      Reps 7  3  -EM      Additional Comments  3\", 7\", 4\"  -EM         Exercise 8    Exercise Name 8  Ankle ABC  -EM      Sets 8  2  -EM      Reps 8  A-Z  -EM         Exercise 9    Exercise Name 9  Ankle Circles: CW, CCW  -EM      Sets 9  1  -EM      Reps 9  30  -EM         Exercise 10    Exercise Name 10  4 Way Ankle TB  -EM      Sets 10  1  -EM      Reps 10  30  -EM      Additional Comments  Green  -EM         Exercise 11    Exercise Name 11  PROM  -EM      Time 11  7'  -EM        User Key  (r) = Recorded By, (t) = Taken By, (c) = Cosigned By    Initials Name Provider Type    EM Barry Gamboa, PTA Physical Therapy Assistant                       PT OP Goals     Row Name 06/26/19 1100          PT Short Term Goals    STG Date to Achieve  06/11/19  -EM     STG 1  Patient will be independent home exercise program  -EM     STG 1 Progress  Met  (Significant)   -EM     STG 2  Patient will have dorsiflexion 7 degrees  -EM     STG 2 Progress  Progressing  -EM     STG 3  Patient plantarflexion 35 degrees  -EM     STG 3 Progress  Progressing  -EM     STG 4  " Patient will have eversion 15 degrees  -EM     STG 4 Progress  Ongoing  -EM     STG 5  Patient will have inversion 25 degrees  -EM     STG 5 Progress  Met  (Significant)   -EM        Long Term Goals    LTG 1  Patient had minimal tenderness to palpation of the posterior calcaneus  -EM     LTG 1 Progress  Ongoing  -EM     LTG 2  Patient has minimal tenderness to palpation of the ATF  -EM     LTG 2 Progress  Met  (Significant)   -EM     LTG 3  Patient to have single leg stance on the left to 6 seconds  -EM     LTG 3 Progress  Ongoing  -EM        Time Calculation    PT Goal Re-Cert Due Date  06/11/19  -EM       User Key  (r) = Recorded By, (t) = Taken By, (c) = Cosigned By    Initials Name Provider Type    EM Barry Gamboa PTA Physical Therapy Assistant                         Time Calculation:   Start Time: 1057  Stop Time: 1145  Time Calculation (min): 48 min  Total Timed Code Minutes- PT: 48 minute(s)  Therapy Charges for Today     Code Description Service Date Service Provider Modifiers Qty    19029676040 HC PT THER PROC EA 15 MIN 6/26/2019 Barry Gamboa PTA GP 3                    Barry Gamboa PTA  6/26/2019

## 2019-06-27 DIAGNOSIS — G89.29 CHRONIC RIGHT-SIDED LOW BACK PAIN WITH RIGHT-SIDED SCIATICA: Chronic | ICD-10-CM

## 2019-06-27 DIAGNOSIS — M54.41 CHRONIC RIGHT-SIDED LOW BACK PAIN WITH RIGHT-SIDED SCIATICA: Chronic | ICD-10-CM

## 2019-06-27 RX ORDER — HYDROCODONE BITARTRATE AND ACETAMINOPHEN 7.5; 325 MG/1; MG/1
1 TABLET ORAL EVERY 4 HOURS PRN
Qty: 180 TABLET | Refills: 0 | Status: SHIPPED | OUTPATIENT
Start: 2019-06-27 | End: 2019-07-25 | Stop reason: SDUPTHER

## 2019-06-28 RX ORDER — VENLAFAXINE HYDROCHLORIDE 150 MG/1
150 CAPSULE, EXTENDED RELEASE ORAL 2 TIMES DAILY
Qty: 60 CAPSULE | Refills: 2 | Status: SHIPPED | OUTPATIENT
Start: 2019-06-28 | End: 2019-08-29

## 2019-07-01 ENCOUNTER — APPOINTMENT (OUTPATIENT)
Dept: PHYSICAL THERAPY | Facility: HOSPITAL | Age: 59
End: 2019-07-01

## 2019-07-08 ENCOUNTER — APPOINTMENT (OUTPATIENT)
Dept: PHYSICAL THERAPY | Facility: HOSPITAL | Age: 59
End: 2019-07-08

## 2019-07-10 ENCOUNTER — HOSPITAL ENCOUNTER (OUTPATIENT)
Dept: PHYSICAL THERAPY | Facility: HOSPITAL | Age: 59
Setting detail: THERAPIES SERIES
Discharge: HOME | End: 2019-07-10

## 2019-07-10 DIAGNOSIS — R26.81 UNSTEADY GAIT: Primary | ICD-10-CM

## 2019-07-10 DIAGNOSIS — S92.355S CLOSED NONDISPLACED FRACTURE OF FIFTH METATARSAL BONE OF LEFT FOOT, SEQUELA: ICD-10-CM

## 2019-07-10 DIAGNOSIS — Q66.70 PES CAVUS: ICD-10-CM

## 2019-07-10 PROCEDURE — 97110 THERAPEUTIC EXERCISES: CPT

## 2019-07-10 NOTE — THERAPY TREATMENT NOTE
Outpatient Physical Therapy Ortho Treatment Note  [facility]     Patient Name: Ariana Martinez  : 1960  MRN: [id number]  Today's Date: 7/10/2019      Visit Date: 07/10/2019   Attendance:  Subjective improvement:40%  Recert: 7/15/19  MD Appointment: 19      Visit Dx:    ICD-10-CM ICD-9-CM   1. Unsteady gait R26.81 781.2   2. Pes cavus Q66.7 736.73   3. Closed nondisplaced fracture of fifth metatarsal bone of left foot, sequela S92.355S 905.4       Patient Active Problem List   Diagnosis   • 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, with long-term current use of insulin (CMS/[facility])   • Closed nondisplaced fracture of fifth left metatarsal bone   • MAURICIO (generalized anxiety disorder)   • Depression, major, recurrent, moderate (CMS/HCC)   • GERD without esophagitis   • Long term prescription opiate use   • Mixed hyperlipidemia   • Vitamin D deficiency   • Seasonal allergic rhinitis   • Restrictive lung disease secondary to obesity   • Snoring   • Class 3 severe obesity due to excess calories without serious comorbidity with body mass index (BMI) of 40.0 to 44.9 in adult (CMS/HCC)   • (HFpEF) heart failure with preserved ejection fraction (CMS/HCC)   • Diabetic peripheral neuropathy associated with type 2 diabetes mellitus (CMS/[facility])   • Cyanocobalamin deficiency   • CAD (coronary artery disease)   • Hypertension   • Meniere's disease   • Gastroparesis   • Otitis media with effusion, left   • Pulmonary hypertension (CMS/[facility])   • Displaced fracture of fifth metatarsal bone, left foot, subsequent encounter for fracture with nonunion   • Pes cavus   • Primary osteoarthritis involving multiple joints   • Generalized anxiety disorder   • Chronic right-sided low back pain with right-sided sciatica        Past Medical History:   Diagnosis Date   • Angina, class IV (CMS/HCC)    • Anxiety    • Benign paroxysmal positional vertigo    • Carpal tunnel syndrome    • Chronic  pain    • Coronary atherosclerosis     hx CABG 2005.  is followed by Dr Kwon   • Depression    • Diabetes mellitus (CMS/[facility])     Type 2, controlled   • Diabetic polyneuropathy (CMS/[facility])    • Elevated cholesterol    • Female stress incontinence    • Gastroparesis    • GERD (gastroesophageal reflux disease)    • Hyperlipidemia    • Hypertension    • Low back pain    • Malaise and fatigue    • Multiple joint pain    • Obesity     Refuses to be weighed   • Otalgia     Both   • Perforation of tympanic membrane     Left   • Postoperative wound infection    • Vitamin D deficiency         Past Surgical History:   Procedure Laterality Date   • ABDOMINAL SURGERY     • ANGIOPLASTY      coronary   • BREAST BIOPSY Right    • CARDIAC CATHETERIZATION     • CARDIAC CATHETERIZATION N/A 6/20/2017    Procedure: Right Heart Cath;  Surgeon: Can Kwon MD PhD;  Location: [facility] CATH INVASIVE LOCATION;  Service:    • CARPAL TUNNEL RELEASE     • CHOLECYSTECTOMY     • CORONARY ARTERY BYPASS GRAFT  2005   • ENDOSCOPY N/A 10/19/2018    Procedure: ESOPHAGOGASTRODUODENOSCOPY possible dilation;  Surgeon: Julián Maldonado MD;  Location: [facility] ENDOSCOPY;  Service: Gastroenterology   • ENDOSCOPY AND COLONOSCOPY     • FOOT SURGERY      Toes   • GASTRIC BANDING      Revision, laparoscopic   • HYSTERECTOMY     • MOUTH SURGERY     • SALPINGO OOPHORECTOMY     • SHOULDER SURGERY     • SUBTALAR ARTHRODESIS Left 1/16/2019    Procedure: LEFT FOOT HARDWARE REMOVAL, FIFTH METATARSAL , OPEN REDUCTION INTERNAL FIXATION, CALCANEAL OSTEOTOMY;  Surgeon: Ignacio Lord DPM;  Location: [facility] OR;  Service: Podiatry   • TRANSESOPHAGEAL ECHOCARDIOGRAM (LAMONTE)      With color flow       PT Ortho     Row Name 07/10/19 0800       Posture/Observations    Posture/Observations Comments  Minimal TTP noted L palmar foot. Amb Ind with mild antalgic gt.  -EM      User Key  (r) = Recorded By, (t) = Taken By, (c) = Cosigned By    Initials Name Provider Type  "   EM Barry Gamboa PTA Physical Therapy Assistant                      PT Assessment/Plan     Row Name 07/10/19 0800          PT Assessment    Functional Limitations  Decreased safety during functional activities;Impaired gait;Limitation in home management;Performance in leisure activities;Limitations in community activities  -EM     Impairments  Endurance;Gait;Balance;Range of motion;Muscle strength  -EM     Assessment Comments  Pt becki tx well with significant improvements noted with tandem stance this tx.  Pt is having increased pain at ball of foot with WB, but no reactivity noted with todays therex regime. Pt had a slight improvement in pain post tx.  -EM     Rehab Potential  Good  -EM     Patient/caregiver participated in establishment of treatment plan and goals  Yes  -EM     Patient would benefit from skilled therapy intervention  Yes  -EM        PT Plan    PT Frequency  2x/week  -EM     Predicted Duration of Therapy Intervention (Therapy Eval)  2-3 wks  -EM     PT Plan Comments  ROM measuements next tx.   -EM       User Key  (r) = Recorded By, (t) = Taken By, (c) = Cosigned By    Initials Name Provider Type    EM Barry Gamboa PTA Physical Therapy Assistant            Exercises     Row Name 07/10/19 0800             Subjective Comments    Subjective Comments  Pt states \"I feel like there is a bone going through my foot when I walk (along arch). It's getting more frequent.\" Pt missed past 3 visits due to being ill\" Pt describes the pain as \"sharp\" Pt state it had been ongoing for about 2 weeks now.  -EM         Subjective Pain    Able to rate subjective pain?  yes  -EM      Pre-Treatment Pain Level  5  -EM      Post-Treatment Pain Level  4  -EM         Exercise 1    Exercise Name 1  PRO II  -EM      Time 1  10'  -EM         Exercise 2    Exercise Name 2  Incline Calf S  -EM      Sets 2  3  -EM      Time 2  30\"  -EM         Exercise 3    Exercise Name 3  Offstep CR  -EM      Sets 3  1  -EM      Reps 3  30 " " -EM         Exercise 4    Exercise Name 4  St TR  -EM      Sets 4  1  -EM      Reps 4  30  -EM         Exercise 5    Exercise Name 5  Fwd/Lat Step Up on BOSU  -EM      Sets 5  1  -EM      Reps 5  30  -EM         Exercise 6    Exercise Name 6  Tandem Stance: L LE fwd  -EM      Sets 6  1  -EM      Reps 6  3  -EM      Additional Comments  54\", 54\", 43\"  -EM         Exercise 7    Exercise Name 7  Tandem Stance: R LE fwd  -EM      Sets 7  1  -EM      Reps 7  3  -EM      Additional Comments  18\", 28\", 17\"  -EM         Exercise 8    Exercise Name 8  Ankle ABC  -EM      Sets 8  2  -EM      Reps 8  A-Z  -EM         Exercise 9    Exercise Name 9  Ankle Circles: CW, CCW  -EM      Sets 9  1  -EM      Reps 9  30  -EM         Exercise 10    Exercise Name 10  4 Way Ankle TB  -EM      Sets 10  1  -EM      Reps 10  30  -EM      Additional Comments  Blue  -EM         Exercise 11    Exercise Name 11  PROM  -EM      Time 11  8'  -EM        User Key  (r) = Recorded By, (t) = Taken By, (c) = Cosigned By    Initials Name Provider Type    EM Barry Gamboa PTA Physical Therapy Assistant                      Manual Rx (last 36 hours)      Manual Treatments     Row Name 07/10/19 0900             Manual Rx 1    Manual Rx 1 Location  L ankle  -EM      Manual Rx 1 Type  PROM: PF,DF, IV, EV  -EM      Manual Rx 1 Duration  8'  -EM        User Key  (r) = Recorded By, (t) = Taken By, (c) = Cosigned By    Initials Name Provider Type    Barry Bhatt PTA Physical Therapy Assistant          PT OP Goals     Row Name 07/10/19 0800          PT Short Term Goals    STG Date to Achieve  06/11/19  -EM     STG 1  Patient will be independent home exercise program  -EM     STG 1 Progress  Met  (Significant)   -EM     STG 2  Patient will have dorsiflexion 7 degrees  -EM     STG 2 Progress  Progressing  -EM     STG 3  Patient plantarflexion 35 degrees  -EM     STG 3 Progress  Progressing  -EM     STG 4  Patient will have eversion 15 degrees  -EM     STG 4 " Progress  Ongoing  -EM     STG 5  Patient will have inversion 25 degrees  -EM     STG 5 Progress  Met  (Significant)   -EM        Long Term Goals    LTG 1  Patient had minimal tenderness to palpation of the posterior calcaneus  -EM     LTG 1 Progress  Ongoing  -EM     LTG 2  Patient has minimal tenderness to palpation of the ATF  -EM     LTG 2 Progress  Met  (Significant)   -EM     LTG 3  Patient to have single leg stance on the left to 6 seconds  -EM     LTG 3 Progress  Ongoing  -EM        Time Calculation    PT Goal Re-Cert Due Date  06/11/19  -EM       User Key  (r) = Recorded By, (t) = Taken By, (c) = Cosigned By    Initials Name Provider Type    EM Barry Gamboa PTA Physical Therapy Assistant                         Time Calculation:   Start Time: 0802  Stop Time: 0901  Time Calculation (min): 59 min  Total Timed Code Minutes- PT: 59 minute(s)  Therapy Charges for Today     Code Description Service Date Service Provider Modifiers Qty    66580912969 HC PT THER PROC EA 15 MIN 7/10/2019 Barry Gamboa PTA GP 4                    Barry Gamboa PTA  7/10/2019

## 2019-07-11 ENCOUNTER — TELEPHONE (OUTPATIENT)
Dept: FAMILY MEDICINE CLINIC | Facility: CLINIC | Age: 59
End: 2019-07-11

## 2019-07-11 RX ORDER — INSULIN GLARGINE 100 [IU]/ML
100 INJECTION, SOLUTION SUBCUTANEOUS NIGHTLY
Qty: 5 PEN | Refills: 5 | Status: SHIPPED | OUTPATIENT
Start: 2019-07-11 | End: 2019-12-11 | Stop reason: SDUPTHER

## 2019-07-11 NOTE — TELEPHONE ENCOUNTER
CHIQUITA BAILEY IS NEEDING A REFILL ON BASAGLAR TO BE SENT TO [facility]  HER# [phone]

## 2019-07-12 ENCOUNTER — APPOINTMENT (OUTPATIENT)
Dept: CARDIOLOGY | Facility: HOSPITAL | Age: 59
End: 2019-07-12

## 2019-07-12 ENCOUNTER — APPOINTMENT (OUTPATIENT)
Dept: GENERAL RADIOLOGY | Facility: HOSPITAL | Age: 59
End: 2019-07-12

## 2019-07-12 ENCOUNTER — HOSPITAL ENCOUNTER (OUTPATIENT)
Facility: HOSPITAL | Age: 59
Setting detail: OBSERVATION
Discharge: HOME | End: 2019-07-13
Attending: FAMILY MEDICINE | Admitting: INTERNAL MEDICINE

## 2019-07-12 DIAGNOSIS — R07.9 CHEST PAIN, UNSPECIFIED TYPE: Primary | ICD-10-CM

## 2019-07-12 LAB
ALBUMIN SERPL-MCNC: 4.2 G/DL (ref 3.5–5.2)
ALBUMIN/GLOB SERPL: 1.5 G/DL
ALP SERPL-CCNC: 147 U/L (ref 39–117)
ALT SERPL W P-5'-P-CCNC: 17 U/L (ref 1–33)
ANION GAP SERPL CALCULATED.3IONS-SCNC: 15 MMOL/L (ref 5–15)
AST SERPL-CCNC: 16 U/L (ref 1–32)
BASOPHILS # BLD AUTO: 0.05 10*3/MM3 (ref 0–0.2)
BASOPHILS NFR BLD AUTO: 0.4 % (ref 0–1.5)
BH CV ECHO MEAS - ACS: 1.9 CM
BH CV ECHO MEAS - AO MAX PG (FULL): 2.5 MMHG
BH CV ECHO MEAS - AO MAX PG: 6 MMHG
BH CV ECHO MEAS - AO MEAN PG (FULL): 1 MMHG
BH CV ECHO MEAS - AO MEAN PG: 3 MMHG
BH CV ECHO MEAS - AO ROOT AREA (BSA CORRECTED): 1.4
BH CV ECHO MEAS - AO ROOT AREA: 8 CM^2
BH CV ECHO MEAS - AO ROOT DIAM: 3.2 CM
BH CV ECHO MEAS - AO V2 MAX: 122 CM/SEC
BH CV ECHO MEAS - AO V2 MEAN: 83.5 CM/SEC
BH CV ECHO MEAS - AO V2 VTI: 25.5 CM
BH CV ECHO MEAS - AVA(I,A): 2.5 CM^2
BH CV ECHO MEAS - AVA(I,D): 2.5 CM^2
BH CV ECHO MEAS - AVA(V,A): 2.6 CM^2
BH CV ECHO MEAS - AVA(V,D): 2.6 CM^2
BH CV ECHO MEAS - BSA(HAYCOCK): 2.4 M^2
BH CV ECHO MEAS - BSA: 2.3 M^2
BH CV ECHO MEAS - BZI_BMI: 40 KILOGRAMS/M^2
BH CV ECHO MEAS - BZI_METRIC_HEIGHT: 172.7 CM
BH CV ECHO MEAS - BZI_METRIC_WEIGHT: 119.3 KG
BH CV ECHO MEAS - EDV(CUBED): 137.4 ML
BH CV ECHO MEAS - EDV(TEICH): 127.2 ML
BH CV ECHO MEAS - EF(CUBED): 73.1 %
BH CV ECHO MEAS - EF(TEICH): 64.5 %
BH CV ECHO MEAS - ESV(CUBED): 36.9 ML
BH CV ECHO MEAS - ESV(TEICH): 45.1 ML
BH CV ECHO MEAS - FS: 35.5 %
BH CV ECHO MEAS - IVS/LVPW: 1
BH CV ECHO MEAS - IVSD: 1.2 CM
BH CV ECHO MEAS - LA DIMENSION: 5.3 CM
BH CV ECHO MEAS - LA/AO: 1.7
BH CV ECHO MEAS - LV MASS(C)D: 245.8 GRAMS
BH CV ECHO MEAS - LV MASS(C)DI: 107 GRAMS/M^2
BH CV ECHO MEAS - LV MAX PG: 3.4 MMHG
BH CV ECHO MEAS - LV MEAN PG: 2 MMHG
BH CV ECHO MEAS - LV V1 MAX: 92.4 CM/SEC
BH CV ECHO MEAS - LV V1 MEAN: 60.7 CM/SEC
BH CV ECHO MEAS - LV V1 VTI: 18.6 CM
BH CV ECHO MEAS - LVIDD: 5.2 CM
BH CV ECHO MEAS - LVIDS: 3.3 CM
BH CV ECHO MEAS - LVOT AREA (M): 3.5 CM^2
BH CV ECHO MEAS - LVOT AREA: 3.5 CM^2
BH CV ECHO MEAS - LVOT DIAM: 2.1 CM
BH CV ECHO MEAS - LVPWD: 1.2 CM
BH CV ECHO MEAS - MR MAX PG: 22.8 MMHG
BH CV ECHO MEAS - MR MAX VEL: 239 CM/SEC
BH CV ECHO MEAS - MV A MAX VEL: 91.7 CM/SEC
BH CV ECHO MEAS - MV DEC SLOPE: 366 CM/SEC^2
BH CV ECHO MEAS - MV E MAX VEL: 102 CM/SEC
BH CV ECHO MEAS - MV E/A: 1.1
BH CV ECHO MEAS - MV MAX PG: 3.4 MMHG
BH CV ECHO MEAS - MV MEAN PG: 1 MMHG
BH CV ECHO MEAS - MV P1/2T MAX VEL: 90.3 CM/SEC
BH CV ECHO MEAS - MV P1/2T: 72.3 MSEC
BH CV ECHO MEAS - MV V2 MAX: 91.8 CM/SEC
BH CV ECHO MEAS - MV V2 MEAN: 48.3 CM/SEC
BH CV ECHO MEAS - MV V2 VTI: 23.9 CM
BH CV ECHO MEAS - MVA P1/2T LCG: 2.4 CM^2
BH CV ECHO MEAS - MVA(P1/2T): 3 CM^2
BH CV ECHO MEAS - MVA(VTI): 2.7 CM^2
BH CV ECHO MEAS - PA MAX PG: 4.2 MMHG
BH CV ECHO MEAS - PA V2 MAX: 102 CM/SEC
BH CV ECHO MEAS - RAP SYSTOLE: 10 MMHG
BH CV ECHO MEAS - RVDD: 2.2 CM
BH CV ECHO MEAS - RVSP: 38.3 MMHG
BH CV ECHO MEAS - SI(AO): 89.3 ML/M^2
BH CV ECHO MEAS - SI(CUBED): 43.8 ML/M^2
BH CV ECHO MEAS - SI(LVOT): 28.1 ML/M^2
BH CV ECHO MEAS - SI(TEICH): 35.8 ML/M^2
BH CV ECHO MEAS - SV(AO): 205.1 ML
BH CV ECHO MEAS - SV(CUBED): 100.5 ML
BH CV ECHO MEAS - SV(LVOT): 64.4 ML
BH CV ECHO MEAS - SV(TEICH): 82.1 ML
BH CV ECHO MEAS - TR MAX VEL: 266 CM/SEC
BILIRUB SERPL-MCNC: <0.2 MG/DL (ref 0.2–1.2)
BUN BLD-MCNC: 16 MG/DL (ref 6–20)
BUN/CREAT SERPL: 14.4 (ref 7–25)
CALCIUM SPEC-SCNC: 9.6 MG/DL (ref 8.6–10.5)
CHLORIDE SERPL-SCNC: 99 MMOL/L (ref 98–107)
CO2 SERPL-SCNC: 25 MMOL/L (ref 22–29)
CREAT BLD-MCNC: 1.11 MG/DL (ref 0.57–1)
DEPRECATED RDW RBC AUTO: 43 FL (ref 37–54)
EOSINOPHIL # BLD AUTO: 0.46 10*3/MM3 (ref 0–0.4)
EOSINOPHIL NFR BLD AUTO: 3.6 % (ref 0.3–6.2)
ERYTHROCYTE [DISTWIDTH] IN BLOOD BY AUTOMATED COUNT: 14.1 % (ref 12.3–15.4)
GFR SERPL CREATININE-BSD FRML MDRD: 51 ML/MIN/1.73
GLOBULIN UR ELPH-MCNC: 2.8 GM/DL
GLUCOSE BLD-MCNC: 190 MG/DL (ref 65–99)
GLUCOSE BLDC GLUCOMTR-MCNC: 109 MG/DL (ref 70–130)
GLUCOSE BLDC GLUCOMTR-MCNC: 178 MG/DL (ref 70–130)
GLUCOSE BLDC GLUCOMTR-MCNC: 86 MG/DL (ref 70–130)
HBA1C MFR BLD: 8.8 % (ref 4.8–5.6)
HCT VFR BLD AUTO: 38.1 % (ref 34–46.6)
HGB BLD-MCNC: 12.5 G/DL (ref 12–15.9)
HOLD SPECIMEN: NORMAL
HOLD SPECIMEN: NORMAL
IMM GRANULOCYTES # BLD AUTO: 0.05 10*3/MM3 (ref 0–0.05)
IMM GRANULOCYTES NFR BLD AUTO: 0.4 % (ref 0–0.5)
LIPASE SERPL-CCNC: 22 U/L (ref 13–60)
LV EF 2D ECHO EST: 62 %
LYMPHOCYTES # BLD AUTO: 3.33 10*3/MM3 (ref 0.7–3.1)
LYMPHOCYTES NFR BLD AUTO: 26.2 % (ref 19.6–45.3)
MAXIMAL PREDICTED HEART RATE: 163 BPM
MCH RBC QN AUTO: 27.7 PG (ref 26.6–33)
MCHC RBC AUTO-ENTMCNC: 32.8 G/DL (ref 31.5–35.7)
MCV RBC AUTO: 84.5 FL (ref 79–97)
MONOCYTES # BLD AUTO: 0.7 10*3/MM3 (ref 0.1–0.9)
MONOCYTES NFR BLD AUTO: 5.5 % (ref 5–12)
NEUTROPHILS # BLD AUTO: 8.13 10*3/MM3 (ref 1.7–7)
NEUTROPHILS NFR BLD AUTO: 63.9 % (ref 42.7–76)
NRBC BLD AUTO-RTO: 0 /100 WBC (ref 0–0.2)
NT-PROBNP SERPL-MCNC: 82.3 PG/ML (ref 5–900)
PLATELET # BLD AUTO: 396 10*3/MM3 (ref 140–450)
PMV BLD AUTO: 9.7 FL (ref 6–12)
POTASSIUM BLD-SCNC: 3.5 MMOL/L (ref 3.5–5.2)
PROT SERPL-MCNC: 7 G/DL (ref 6–8.5)
RBC # BLD AUTO: 4.51 10*6/MM3 (ref 3.77–5.28)
SODIUM BLD-SCNC: 139 MMOL/L (ref 136–145)
STRESS TARGET HR: 139 BPM
TROPONIN T SERPL-MCNC: <0.01 NG/ML (ref 0–0.03)
TROPONIN T SERPL-MCNC: <0.01 NG/ML (ref 0–0.03)
TSH SERPL DL<=0.05 MIU/L-ACNC: 8.99 MIU/ML (ref 0.27–4.2)
WBC NRBC COR # BLD: 12.72 10*3/MM3 (ref 3.4–10.8)
WHOLE BLOOD HOLD SPECIMEN: NORMAL
WHOLE BLOOD HOLD SPECIMEN: NORMAL

## 2019-07-12 PROCEDURE — 83690 ASSAY OF LIPASE: CPT | Performed by: FAMILY MEDICINE

## 2019-07-12 PROCEDURE — 25010000002 MORPHINE PER 10 MG: Performed by: FAMILY MEDICINE

## 2019-07-12 PROCEDURE — G0378 HOSPITAL OBSERVATION PER HR: HCPCS

## 2019-07-12 PROCEDURE — 85025 COMPLETE CBC W/AUTO DIFF WBC: CPT | Performed by: PHYSICIAN ASSISTANT

## 2019-07-12 PROCEDURE — 93010 ELECTROCARDIOGRAM REPORT: CPT | Performed by: INTERNAL MEDICINE

## 2019-07-12 PROCEDURE — 93306 TTE W/DOPPLER COMPLETE: CPT

## 2019-07-12 PROCEDURE — 25010000002 PERFLUTREN (DEFINITY) 8.476 MG IN SODIUM CHLORIDE 0.9 % 10 ML INJECTION: Performed by: INTERNAL MEDICINE

## 2019-07-12 PROCEDURE — 93306 TTE W/DOPPLER COMPLETE: CPT | Performed by: INTERNAL MEDICINE

## 2019-07-12 PROCEDURE — 82962 GLUCOSE BLOOD TEST: CPT

## 2019-07-12 PROCEDURE — 96375 TX/PRO/DX INJ NEW DRUG ADDON: CPT

## 2019-07-12 PROCEDURE — 36415 COLL VENOUS BLD VENIPUNCTURE: CPT | Performed by: PHYSICIAN ASSISTANT

## 2019-07-12 PROCEDURE — 25010000002 ONDANSETRON PER 1 MG: Performed by: PHYSICIAN ASSISTANT

## 2019-07-12 PROCEDURE — 99214 OFFICE O/P EST MOD 30 MIN: CPT | Performed by: INTERNAL MEDICINE

## 2019-07-12 PROCEDURE — 84484 ASSAY OF TROPONIN QUANT: CPT | Performed by: PHYSICIAN ASSISTANT

## 2019-07-12 PROCEDURE — 83036 HEMOGLOBIN GLYCOSYLATED A1C: CPT | Performed by: FAMILY MEDICINE

## 2019-07-12 PROCEDURE — 80053 COMPREHEN METABOLIC PANEL: CPT | Performed by: PHYSICIAN ASSISTANT

## 2019-07-12 PROCEDURE — 84443 ASSAY THYROID STIM HORMONE: CPT | Performed by: FAMILY MEDICINE

## 2019-07-12 PROCEDURE — 93005 ELECTROCARDIOGRAM TRACING: CPT | Performed by: FAMILY MEDICINE

## 2019-07-12 PROCEDURE — 63710000001 INSULIN ASPART PER 5 UNITS: Performed by: FAMILY MEDICINE

## 2019-07-12 PROCEDURE — 94760 N-INVAS EAR/PLS OXIMETRY 1: CPT

## 2019-07-12 PROCEDURE — 94799 UNLISTED PULMONARY SVC/PX: CPT

## 2019-07-12 PROCEDURE — 71045 X-RAY EXAM CHEST 1 VIEW: CPT

## 2019-07-12 PROCEDURE — 83880 ASSAY OF NATRIURETIC PEPTIDE: CPT | Performed by: PHYSICIAN ASSISTANT

## 2019-07-12 PROCEDURE — 96374 THER/PROPH/DIAG INJ IV PUSH: CPT

## 2019-07-12 PROCEDURE — 63710000001 INSULIN DETEMIR PER 5 UNITS: Performed by: FAMILY MEDICINE

## 2019-07-12 PROCEDURE — 25010000002 ONDANSETRON PER 1 MG: Performed by: FAMILY MEDICINE

## 2019-07-12 PROCEDURE — 96376 TX/PRO/DX INJ SAME DRUG ADON: CPT

## 2019-07-12 PROCEDURE — 99284 EMERGENCY DEPT VISIT MOD MDM: CPT

## 2019-07-12 RX ORDER — ONDANSETRON 2 MG/ML
4 INJECTION INTRAMUSCULAR; INTRAVENOUS EVERY 6 HOURS PRN
Status: DISCONTINUED | OUTPATIENT
Start: 2019-07-12 | End: 2019-07-13 | Stop reason: HOSPADM

## 2019-07-12 RX ORDER — SODIUM CHLORIDE 0.9 % (FLUSH) 0.9 %
3-10 SYRINGE (ML) INJECTION AS NEEDED
Status: DISCONTINUED | OUTPATIENT
Start: 2019-07-12 | End: 2019-07-13 | Stop reason: HOSPADM

## 2019-07-12 RX ORDER — DIPHENHYDRAMINE HYDROCHLORIDE 50 MG/ML
25 INJECTION INTRAMUSCULAR; INTRAVENOUS ONCE
Status: DISCONTINUED | OUTPATIENT
Start: 2019-07-13 | End: 2019-07-13 | Stop reason: HOSPADM

## 2019-07-12 RX ORDER — CLOPIDOGREL BISULFATE 75 MG/1
75 TABLET ORAL DAILY
Status: DISCONTINUED | OUTPATIENT
Start: 2019-07-12 | End: 2019-07-13 | Stop reason: HOSPADM

## 2019-07-12 RX ORDER — ONDANSETRON 2 MG/ML
4 INJECTION INTRAMUSCULAR; INTRAVENOUS ONCE
Status: COMPLETED | OUTPATIENT
Start: 2019-07-12 | End: 2019-07-12

## 2019-07-12 RX ORDER — MECLIZINE HYDROCHLORIDE 25 MG/1
25 TABLET ORAL 3 TIMES DAILY PRN
Status: DISCONTINUED | OUTPATIENT
Start: 2019-07-12 | End: 2019-07-13 | Stop reason: HOSPADM

## 2019-07-12 RX ORDER — SODIUM CHLORIDE 0.9 % (FLUSH) 0.9 %
3 SYRINGE (ML) INJECTION EVERY 12 HOURS SCHEDULED
Status: DISCONTINUED | OUTPATIENT
Start: 2019-07-12 | End: 2019-07-13 | Stop reason: HOSPADM

## 2019-07-12 RX ORDER — METOPROLOL SUCCINATE 25 MG/1
25 TABLET, EXTENDED RELEASE ORAL DAILY
Status: DISCONTINUED | OUTPATIENT
Start: 2019-07-12 | End: 2019-07-13 | Stop reason: HOSPADM

## 2019-07-12 RX ORDER — LORAZEPAM 0.5 MG/1
0.5 TABLET ORAL DAILY PRN
Status: DISCONTINUED | OUTPATIENT
Start: 2019-07-12 | End: 2019-07-13 | Stop reason: HOSPADM

## 2019-07-12 RX ORDER — GABAPENTIN 400 MG/1
400 CAPSULE ORAL 3 TIMES DAILY
Status: DISCONTINUED | OUTPATIENT
Start: 2019-07-12 | End: 2019-07-13 | Stop reason: HOSPADM

## 2019-07-12 RX ORDER — SODIUM CHLORIDE 0.9 % (FLUSH) 0.9 %
10 SYRINGE (ML) INJECTION AS NEEDED
Status: DISCONTINUED | OUTPATIENT
Start: 2019-07-12 | End: 2019-07-13 | Stop reason: HOSPADM

## 2019-07-12 RX ORDER — ACETAMINOPHEN 325 MG/1
650 TABLET ORAL EVERY 4 HOURS PRN
Status: DISCONTINUED | OUTPATIENT
Start: 2019-07-12 | End: 2019-07-13 | Stop reason: HOSPADM

## 2019-07-12 RX ORDER — SODIUM CHLORIDE 9 MG/ML
INJECTION, SOLUTION INTRAVENOUS
Status: DISPENSED
Start: 2019-07-12 | End: 2019-07-13

## 2019-07-12 RX ORDER — DEXTROSE MONOHYDRATE 25 G/50ML
25 INJECTION, SOLUTION INTRAVENOUS
Status: DISCONTINUED | OUTPATIENT
Start: 2019-07-12 | End: 2019-07-13 | Stop reason: HOSPADM

## 2019-07-12 RX ORDER — NICOTINE POLACRILEX 4 MG
15 LOZENGE BUCCAL
Status: DISCONTINUED | OUTPATIENT
Start: 2019-07-12 | End: 2019-07-13 | Stop reason: HOSPADM

## 2019-07-12 RX ORDER — METOPROLOL TARTRATE 5 MG/5ML
2.5 INJECTION INTRAVENOUS ONCE
Status: COMPLETED | OUTPATIENT
Start: 2019-07-13 | End: 2019-07-13

## 2019-07-12 RX ORDER — VENLAFAXINE HYDROCHLORIDE 75 MG/1
150 CAPSULE, EXTENDED RELEASE ORAL 2 TIMES DAILY
Status: DISCONTINUED | OUTPATIENT
Start: 2019-07-12 | End: 2019-07-13 | Stop reason: HOSPADM

## 2019-07-12 RX ORDER — ARIPIPRAZOLE 15 MG/1
15 TABLET ORAL DAILY
Status: DISCONTINUED | OUTPATIENT
Start: 2019-07-12 | End: 2019-07-13 | Stop reason: HOSPADM

## 2019-07-12 RX ORDER — SODIUM CHLORIDE 9 MG/ML
75 INJECTION, SOLUTION INTRAVENOUS CONTINUOUS
Status: DISCONTINUED | OUTPATIENT
Start: 2019-07-12 | End: 2019-07-13

## 2019-07-12 RX ORDER — ATORVASTATIN CALCIUM 40 MG/1
40 TABLET, FILM COATED ORAL NIGHTLY
Status: DISCONTINUED | OUTPATIENT
Start: 2019-07-12 | End: 2019-07-13 | Stop reason: HOSPADM

## 2019-07-12 RX ORDER — HYDROCODONE BITARTRATE AND ACETAMINOPHEN 7.5; 325 MG/1; MG/1
1 TABLET ORAL EVERY 4 HOURS PRN
Status: DISCONTINUED | OUTPATIENT
Start: 2019-07-12 | End: 2019-07-13 | Stop reason: HOSPADM

## 2019-07-12 RX ORDER — MIRTAZAPINE 15 MG/1
45 TABLET, FILM COATED ORAL NIGHTLY
Status: DISCONTINUED | OUTPATIENT
Start: 2019-07-12 | End: 2019-07-13 | Stop reason: HOSPADM

## 2019-07-12 RX ORDER — PANTOPRAZOLE SODIUM 40 MG/1
40 TABLET, DELAYED RELEASE ORAL EVERY MORNING
Status: DISCONTINUED | OUTPATIENT
Start: 2019-07-12 | End: 2019-07-13 | Stop reason: HOSPADM

## 2019-07-12 RX ORDER — ASPIRIN 81 MG/1
324 TABLET, CHEWABLE ORAL ONCE
Status: COMPLETED | OUTPATIENT
Start: 2019-07-12 | End: 2019-07-12

## 2019-07-12 RX ORDER — ASPIRIN 81 MG/1
81 TABLET, CHEWABLE ORAL DAILY
Status: DISCONTINUED | OUTPATIENT
Start: 2019-07-12 | End: 2019-07-13 | Stop reason: HOSPADM

## 2019-07-12 RX ORDER — MORPHINE SULFATE 2 MG/ML
2 INJECTION, SOLUTION INTRAMUSCULAR; INTRAVENOUS ONCE
Status: COMPLETED | OUTPATIENT
Start: 2019-07-12 | End: 2019-07-12

## 2019-07-12 RX ORDER — METOCLOPRAMIDE 10 MG/1
10 TABLET ORAL
Status: DISCONTINUED | OUTPATIENT
Start: 2019-07-12 | End: 2019-07-13 | Stop reason: HOSPADM

## 2019-07-12 RX ADMIN — ONDANSETRON 4 MG: 2 INJECTION INTRAMUSCULAR; INTRAVENOUS at 09:41

## 2019-07-12 RX ADMIN — INSULIN ASPART 4 UNITS: 100 INJECTION, SOLUTION INTRAVENOUS; SUBCUTANEOUS at 21:07

## 2019-07-12 RX ADMIN — HYDROCODONE BITARTRATE AND ACETAMINOPHEN 1 TABLET: 7.5; 325 TABLET ORAL at 15:23

## 2019-07-12 RX ADMIN — ATORVASTATIN CALCIUM 40 MG: 40 TABLET, FILM COATED ORAL at 21:07

## 2019-07-12 RX ADMIN — Medication 3 ML: at 21:08

## 2019-07-12 RX ADMIN — ONDANSETRON 4 MG: 2 INJECTION INTRAMUSCULAR; INTRAVENOUS at 18:56

## 2019-07-12 RX ADMIN — VENLAFAXINE HYDROCHLORIDE 150 MG: 75 CAPSULE, EXTENDED RELEASE ORAL at 21:07

## 2019-07-12 RX ADMIN — NITROGLYCERIN 1 INCH: 20 OINTMENT TOPICAL at 15:20

## 2019-07-12 RX ADMIN — METOCLOPRAMIDE HYDROCHLORIDE 10 MG: 10 TABLET ORAL at 17:13

## 2019-07-12 RX ADMIN — ASPIRIN 81 MG 324 MG: 81 TABLET ORAL at 09:39

## 2019-07-12 RX ADMIN — MIRTAZAPINE 45 MG: 15 TABLET, FILM COATED ORAL at 21:07

## 2019-07-12 RX ADMIN — ONDANSETRON 4 MG: 2 INJECTION INTRAMUSCULAR; INTRAVENOUS at 13:09

## 2019-07-12 RX ADMIN — GABAPENTIN 400 MG: 400 CAPSULE ORAL at 15:20

## 2019-07-12 RX ADMIN — MORPHINE SULFATE 2 MG: 2 INJECTION, SOLUTION INTRAMUSCULAR; INTRAVENOUS at 09:41

## 2019-07-12 RX ADMIN — SODIUM CHLORIDE 1.5 ML: 9 INJECTION INTRAMUSCULAR; INTRAVENOUS; SUBCUTANEOUS at 16:00

## 2019-07-12 RX ADMIN — INSULIN DETEMIR 15 UNITS: 100 INJECTION, SOLUTION SUBCUTANEOUS at 21:07

## 2019-07-12 RX ADMIN — METOCLOPRAMIDE HYDROCHLORIDE 10 MG: 10 TABLET ORAL at 21:07

## 2019-07-12 RX ADMIN — GABAPENTIN 400 MG: 400 CAPSULE ORAL at 21:11

## 2019-07-12 NOTE — CONSULTS
Cardiology at [facility]  Cardiovascular Consultation Note      Ariana Martinez  326/1  [id number]  1960    DATE OF ADMISSION: 7/12/2019  DATE OF CONSULTATION:  7/12/2019    Francisca Fong MD  Treatment Team:   Consulting Physician: Darien Guzman MD  Consulting Physician: Justus Jim MD  Admitting Provider: Darien Guzman MD    Chief Complaint   Patient presents with   • Chest Pain       Chief complaint/ Reason for Consultation: Chest pain with history of CAD and CABG      History of Present Illness    Patient's Body mass index is 40.05 kg/m². BMI is above normal parameters. Recommendations include: exercise counseling, nutrition counseling and referral to primary care     59 years old patient known to Dr. Kwon with history of CAD status post CABG with LIMA to LAD in 2005, hypertension with hypertensive heart disease, congestive heart failure in the base of diastolic dysfunction, pulmonary hypertension, diabetes, hyperlipidemia, morbid obesity who previously have normal stress test admitted for evaluation chest pain pressure-like feeling over the left precordium with radiation to the neck area associated mild shortness of breath.  Pain 7 on a scale of 10 approximate 3-hour of duration her pain is also reproducible upon palpation.  EKG sinus rhythm without any acute ST-T wave changes initial cardiac enzyme and been BNP is normal.  Patient resting comfortably in the bed.  Had a history of noncompliant in the past otherwise appropriate medical management no syncope or near syncopal episode or palpitation reported.  Patient denies intermittent claudications.  .    Concurrent Medical History:  has a past medical history of Angina, class IV (CMS/HCC), Anxiety, Benign paroxysmal positional vertigo, Carpal tunnel syndrome, Chronic pain, Coronary atherosclerosis, Depression, Diabetes mellitus (CMS/HCC), Diabetic polyneuropathy (CMS/HCC), Elevated cholesterol, Female stress  incontinence, Gastroparesis, GERD (gastroesophageal reflux disease), Hyperlipidemia, Hypertension, Low back pain, Malaise and fatigue, Multiple joint pain, Obesity, Otalgia, Perforation of tympanic membrane, Postoperative wound infection, and Vitamin D deficiency.     Past Surgical History:  has a past surgical history that includes Abdominal surgery; Angioplasty; Coronary artery bypass graft (2005); Cardiac catheterization; Carpal tunnel release; Cholecystectomy; endoscopy and colonoscopy; Mouth surgery; transesophageal echocardiogram (mildred); Foot surgery; gastric banding; Hysterectomy; Shoulder surgery; Salpingoophorectomy; Cardiac catheterization (N/A, 6/20/2017); Breast biopsy (Right); Esophagogastroduodenoscopy (N/A, 10/19/2018); and subtalar arthrodesis (Left, 1/16/2019).     Family History: family history includes Diabetes in her other, sister, sister, sister, sister, sister, and sister; Heart disease in her mother, other, sister, and sister; Hypertension in her mother, other, sister, and sister; Stroke in her mother.     Social History:  reports that she has never smoked. She has never used smokeless tobacco. She reports that she does not drink alcohol or use drugs.        Past Medical History:   Diagnosis Date   • Angina, class IV (CMS/HCC)    • Anxiety    • Benign paroxysmal positional vertigo    • Carpal tunnel syndrome    • Chronic pain    • Coronary atherosclerosis     hx CABG 2005.  is followed by Dr Kwon   • Depression    • Diabetes mellitus (CMS/HCC)     Type 2, controlled   • Diabetic polyneuropathy (CMS/HCC)    • Elevated cholesterol    • Female stress incontinence    • Gastroparesis    • GERD (gastroesophageal reflux disease)    • Hyperlipidemia    • Hypertension    • Low back pain    • Malaise and fatigue    • Multiple joint pain    • Obesity     Refuses to be weighed   • Otalgia     Both   • Perforation of tympanic membrane     Left   • Postoperative wound infection    • Vitamin D deficiency         Past Surgical History:   Procedure Laterality Date   • ABDOMINAL SURGERY     • ANGIOPLASTY      coronary   • BREAST BIOPSY Right    • CARDIAC CATHETERIZATION     • CARDIAC CATHETERIZATION N/A 6/20/2017    Procedure: Right Heart Cath;  Surgeon: Can Kwon MD PhD;  Location: [facility] CATH INVASIVE LOCATION;  Service:    • CARPAL TUNNEL RELEASE     • CHOLECYSTECTOMY     • CORONARY ARTERY BYPASS GRAFT  2005   • ENDOSCOPY N/A 10/19/2018    Procedure: ESOPHAGOGASTRODUODENOSCOPY possible dilation;  Surgeon: Julián Maldonado MD;  Location: [facility] ENDOSCOPY;  Service: Gastroenterology   • ENDOSCOPY AND COLONOSCOPY     • FOOT SURGERY      Toes   • GASTRIC BANDING      Revision, laparoscopic   • HYSTERECTOMY     • MOUTH SURGERY     • SALPINGO OOPHORECTOMY     • SHOULDER SURGERY     • SUBTALAR ARTHRODESIS Left 1/16/2019    Procedure: LEFT FOOT HARDWARE REMOVAL, FIFTH METATARSAL , OPEN REDUCTION INTERNAL FIXATION, CALCANEAL OSTEOTOMY;  Surgeon: Ignacio Lord DPM;  Location: [facility] OR;  Service: Podiatry   • TRANSESOPHAGEAL ECHOCARDIOGRAM (LAMONTE)      With color flow       Allergies   Allergen Reactions   • Seroquel [Quetiapine Fumarate] Anaphylaxis   • Seroquel [Quetiapine] Anaphylaxis   • Avandia [Rosiglitazone] Swelling   • Morphine And Related Hallucinations   • Oxycodone Hallucinations       No current facility-administered medications on file prior to encounter.      Current Outpatient Medications on File Prior to Encounter   Medication Sig Dispense Refill   • ARIPiprazole (ABILIFY) 15 MG tablet Take 1 tablet by mouth Daily. 90 tablet 1   • aspirin 81 MG chewable tablet Chew 81 mg daily.     • atorvastatin (LIPITOR) 40 MG tablet Take 1 tablet by mouth Every Night. 90 tablet 1   • B-D ULTRAFINE III SHORT PEN 31G X 8 MM misc INJECT 1 EACH INTO THE SHOULDER, THIGH, OR BUTTOCKS 4 (FOUR) TIMES A DAY. USE AS DIRECTED 150 each 0   • BD SHARPS CONTAINER HOME misc 1 each Take As Directed. 1 each 0   • Blood  Glucose Monitoring Suppl (ONE TOUCH ULTRA MINI) w/Device kit USE AS DIRECTED TO CHECK BLOOD SUGAR 1 each 0   • Calcium Citrate-Vitamin D (CITRACAL/VITAMIN D) 250-200 MG-UNIT tablet Take 2 tablets by mouth 2 (two) times a day.     • clopidogrel (PLAVIX) 75 MG tablet Take 75 mg by mouth Daily.     • cyanocobalamin 1000 MCG/ML injection Inject 1 mL into the appropriate muscle as directed by prescriber Every 28 (Twenty-Eight) Days. 1 mL 0   • furosemide (LASIX) 40 MG tablet Take 1 tablet by mouth Daily. 90 tablet 1   • gabapentin (NEURONTIN) 400 MG capsule TAKE 1 CAPSULE BY MOUTH THREE TIMES A DAY 90 capsule 2   • GLUCAGON EMERGENCY 1 MG injection USE AS DIRECTED AS NEEDED 1 kit 0   • glucose blood (ONE TOUCH ULTRA TEST) test strip 1 each by Other route 4 (Four) Times a Day. Use as instructed 400 each 1   • hydrochlorothiazide (MICROZIDE) 12.5 MG capsule TAKE 1 CAPSULE BY MOUTH DAILY. 90 capsule 0   • HYDROcodone-acetaminophen (NORCO) 7.5-325 MG per tablet Take 1 tablet by mouth Every 4 (Four) Hours As Needed for Moderate Pain  or Severe Pain . 180 tablet 0   • insulin aspart (novoLOG FLEXPEN) 100 UNIT/ML solution pen-injector sc pen Inject 60 Units under the skin into the appropriate area as directed 3 (Three) Times a Day With Meals.     • insulin aspart (NOVOLOG FLEXPEN) 100 UNIT/ML solution pen-injector sc pen INJECT 60 UNITS BEFORE MEALS 3 TIMES A DAY 5 pen 5   • Insulin Glargine (BASAGLAR KWIKPEN) 100 UNIT/ML injection pen Inject 100 Units under the skin into the appropriate area as directed Every Night. 5 pen 5   • LORazepam (ATIVAN) 0.5 MG tablet Take 1 tablet by mouth Daily As Needed for Anxiety. Fill when due 30 tablet 0   • meclizine (ANTIVERT) 25 MG tablet Take 1 tablet by mouth 3 (Three) Times a Day As Needed for dizziness. 90 tablet 6   • metoclopramide (REGLAN) 10 MG tablet Take 10 mg by mouth 4 (Four) Times a Day As Needed.     • metoprolol succinate XL (TOPROL-XL) 25 MG 24 hr tablet TAKE 1 TABLET BY  "MOUTH DAILY. 31 tablet 6   • mirtazapine (REMERON) 45 MG tablet TAKE 1 TABLET BY MOUTH EVERY NIGHT. 90 tablet 1   • omeprazole (PRILOSEC) 40 MG capsule Take 1 capsule by mouth Daily. 90 capsule 1   • ONE TOUCH ULTRA TEST test strip USE TO TEST SUGAR 4 TIMES A  each 3   • phenazopyridine (PYRIDIUM) 200 MG tablet Take 1 tablet by mouth 3 (Three) Times a Day As Needed for bladder spasms. 21 tablet 0   • Syringe/Needle, Disp, (LUER LOCK SAFETY SYRINGES) 25G X 5/8\" 3 ML misc 1 each Daily. 100 each 0   • venlafaxine XR (EFFEXOR-XR) 150 MG 24 hr capsule Take 1 capsule by mouth 2 (Two) Times a Day. 60 capsule 2   • vitamin D (ERGOCALCIFEROL) 57408 units capsule capsule Take 50,000 Units by mouth 1 (One) Time Per Week. sunday     • gabapentin (NEURONTIN) 400 MG capsule Take 1 capsule by mouth 3 (Three) Times a Day. Fill when due 90 capsule 0   • meloxicam (MOBIC) 15 MG tablet Take 1 tablet by mouth Daily. Take once daily. 30 tablet 0   • SANTYL 250 UNIT/GM ointment APPLY TO AFFECTED AREA DAILY IN NICKEL INCH THICKNESS  5   • vitamin D (ERGOCALCIFEROL) 50937 units capsule capsule TAKE ONE CAPSULE BY MOUTH EVERY SUNDAY 12 capsule 2       Social History     Socioeconomic History   • Marital status:      Spouse name: Not on file   • Number of children: Not on file   • Years of education: Not on file   • Highest education level: Not on file   Tobacco Use   • Smoking status: Never Smoker   • Smokeless tobacco: Never Used   Substance and Sexual Activity   • Alcohol use: No   • Drug use: No   • Sexual activity: Defer           REVIEW OF SYSTEMS:   ROS  Constitutional: Positive for fatigue. Negative for chills, diaphoresis and fever.   Respiratory: Negative for cough, chest tightness, shortness of breath and wheezing.    Cardiovascular: Positive for chest pain. Negative for palpitations and leg swelling.   Gastrointestinal: Positive for nausea. Negative for abdominal pain, constipation, diarrhea and vomiting. " "  Genitourinary: Negative for difficulty urinating and dysuria.   Neurological: Positive for dizziness and light-headedness. Negative for tremors, seizures, syncope, facial asymmetry, speech difficulty, numbness and headaches.   Psychiatric/Behavioral: Negative for confusion.      Otherwise complete ROS is negative except as mentioned above.            Objective:     Vitals:    07/12/19 1315 07/12/19 1331 07/12/19 1528 07/12/19 1620   BP: 123/79  102/54    BP Location: Left arm  Left arm    Patient Position: Sitting  Lying    Pulse: 67 63 68 67   Resp: 16  18    Temp: 97.1 °F (36.2 °C)  96.3 °F (35.7 °C)    TempSrc: Oral  Oral    SpO2: 97% 98% 100%    Weight: 119 kg (263 lb 6 oz)      Height: 172.7 cm (68\")        Body mass index is 40.05 kg/m².  Flowsheet Rows      First Filed Value   Admission Height  172.7 cm (68\") Documented at 07/12/2019 0907   Admission Weight  121 kg (267 lb) Documented at 07/12/2019 0907          Intake/Output Summary (Last 24 hours) at 7/12/2019 1639  Last data filed at 7/12/2019 1355  Gross per 24 hour   Intake 240 ml   Output --   Net 240 ml         Physical Exam   Physical Exam  Constitutional: She is oriented to person, place, and time. She appears well-developed and well-nourished.   HENT:   Head: Normocephalic and atraumatic.   Eyes: Conjunctivae and EOM are normal. Pupils are equal, round, and reactive to light.   Cardiovascular: Normal rate and regular rhythm.   Pulmonary/Chest: Effort normal and breath sounds normal. No stridor. No respiratory distress. She has no wheezes.   Abdominal: Soft. Bowel sounds are normal. She exhibits no distension. There is no tenderness.   Musculoskeletal: She exhibits no edema.   Neurological: She is alert and oriented to person, place, and time. No cranial nerve deficit.   Skin: Skin is warm and dry. Capillary refill takes less than 2 seconds.   Psychiatric: She has a normal mood and affect. Her behavior is normal.       Radiology Review    XR Chest " 1 View   Final Result   CONCLUSION:   No acute pulmonary disease.      Electronically signed by:  Vamshi Rose MD  7/12/2019 9:54 AM CDT   Workstation: [id number]      CT Angiogram Coronary    (Results Pending)       Lab Results:  Lab Results (last 24 hours)     Procedure Component Value Units Date/Time    POC Glucose Once [[id number]]  (Normal) Collected:  07/12/19 1553    Specimen:  Blood Updated:  07/12/19 1618     Glucose 109 mg/dL      Comment: : [id number] URBAN LEXIEMeter ID: [id number]       Troponin [[id number]]  (Normal) Collected:  07/12/19 1331    Specimen:  Blood Updated:  07/12/19 1355     Troponin T <0.010 ng/mL     Narrative:       Troponin T Reference Range:  <= 0.03 ng/mL-   Negative for AMI  >0.03 ng/mL-     Abnormal for myocardial necrosis.  Clinicians would have to utilize clinical acumen, EKG, Troponin and serial changes to determine if it is an Acute Myocardial Infarction or myocardial injury due to an underlying chronic condition.     POC Glucose Once [[id number]]  (Normal) Collected:  07/12/19 1338    Specimen:  Blood Updated:  07/12/19 1351     Glucose 86 mg/dL      Comment: : [id number] URBAN LEXIEMeter ID: [id number]       TSH [[id number]]  (Abnormal) Collected:  07/12/19 0927    Specimen:  Blood Updated:  07/12/19 1317     TSH 8.990 mIU/mL     Hemoglobin A1c [[id number]]  (Abnormal) Collected:  07/12/19 0927    Specimen:  Blood Updated:  07/12/19 1312     Hemoglobin A1C 8.80 %     Narrative:       Hemoglobin A1C Ranges:    Increased Risk for Diabetes  5.7% to 6.4%  Diabetes                     >= 6.5%  Diabetic Goal                < 7.0%    Lipase [[id number]]  (Normal) Collected:  07/12/19 0927    Specimen:  Blood Updated:  07/12/19 1305     Lipase 22 U/L     Stanley Draw [[id number]] Collected:  07/12/19 0927    Specimen:  Blood Updated:  07/12/19 1030    Narrative:       The following orders were created for panel order Stanley Draw.  Procedure                                Abnormality         Status                     ---------                               -----------         ------                     Light Blue Top[[id number]]                                   Final result               Green Top (Gel)[[id number]]                                  Final result               Lavender Top[[id number]]                                     Final result               Gold Top - SST[[id number]]                                   Final result                 Please view results for these tests on the individual orders.    Light Blue Top [[id number]] Collected:  07/12/19 0927    Specimen:  Blood Updated:  07/12/19 1030     Extra Tube hold for add-on     Comment: Auto resulted       Green Top (Gel) [[id number]] Collected:  07/12/19 0927    Specimen:  Blood Updated:  07/12/19 1030     Extra Tube Hold for add-ons.     Comment: Auto resulted.       Gold Top - SST [[id number]] Collected:  07/12/19 0927    Specimen:  Blood Updated:  07/12/19 1030     Extra Tube Hold for add-ons.     Comment: Auto resulted.       Lavender Top [[id number]] Collected:  07/12/19 0927    Specimen:  Blood Updated:  07/12/19 1030     Extra Tube hold for add-on     Comment: Auto resulted       Comprehensive Metabolic Panel [[id number]]  (Abnormal) Collected:  07/12/19 0927    Specimen:  Blood Updated:  07/12/19 1025     Glucose 190 mg/dL      BUN 16 mg/dL      Creatinine 1.11 mg/dL      Sodium 139 mmol/L      Potassium 3.5 mmol/L      Chloride 99 mmol/L      CO2 25.0 mmol/L      Calcium 9.6 mg/dL      Total Protein 7.0 g/dL      Albumin 4.20 g/dL      ALT (SGPT) 17 U/L      AST (SGOT) 16 U/L      Alkaline Phosphatase 147 U/L      Total Bilirubin <0.2 mg/dL      eGFR Non African Amer 51 mL/min/1.73      Globulin 2.8 gm/dL      A/G Ratio 1.5 g/dL      BUN/Creatinine Ratio 14.4     Anion Gap 15.0 mmol/L     Narrative:       GFR Normal >60  Chronic Kidney Disease <60  Kidney Failure <15    Troponin [[id number]]  (Normal) Collected:   07/12/19 0927    Specimen:  Blood Updated:  07/12/19 1022     Troponin T <0.010 ng/mL     Narrative:       Troponin T Reference Range:  <= 0.03 ng/mL-   Negative for AMI  >0.03 ng/mL-     Abnormal for myocardial necrosis.  Clinicians would have to utilize clinical acumen, EKG, Troponin and serial changes to determine if it is an Acute Myocardial Infarction or myocardial injury due to an underlying chronic condition.     BNP [[id number]]  (Normal) Collected:  07/12/19 0927    Specimen:  Blood Updated:  07/12/19 1021     proBNP 82.3 pg/mL     Narrative:       Among patients with dyspnea, NT-proBNP is highly sensitive for the detection of acute congestive heart failure. In addition NT-proBNP of <300 pg/ml effectively rules out acute congestive heart failure with 99% negative predictive value.    CBC & Differential [[id number]] Collected:  07/12/19 0927    Specimen:  Blood Updated:  07/12/19 0942    Narrative:       The following orders were created for panel order CBC & Differential.  Procedure                               Abnormality         Status                     ---------                               -----------         ------                     CBC Auto Differential[[id number]]        Abnormal            Final result                 Please view results for these tests on the individual orders.    CBC Auto Differential [[id number]]  (Abnormal) Collected:  07/12/19 0927    Specimen:  Blood Updated:  07/12/19 0942     WBC 12.72 10*3/mm3      RBC 4.51 10*6/mm3      Hemoglobin 12.5 g/dL      Hematocrit 38.1 %      MCV 84.5 fL      MCH 27.7 pg      MCHC 32.8 g/dL      RDW 14.1 %      RDW-SD 43.0 fl      MPV 9.7 fL      Platelets 396 10*3/mm3      Neutrophil % 63.9 %      Lymphocyte % 26.2 %      Monocyte % 5.5 %      Eosinophil % 3.6 %      Basophil % 0.4 %      Immature Grans % 0.4 %      Neutrophils, Absolute 8.13 10*3/mm3      Lymphocytes, Absolute 3.33 10*3/mm3      Monocytes, Absolute 0.70 10*3/mm3       Eosinophils, Absolute 0.46 10*3/mm3      Basophils, Absolute 0.05 10*3/mm3      Immature Grans, Absolute 0.05 10*3/mm3      nRBC 0.0 /100 WBC           I personally viewed and interpreted the patient's EKG/Telemetry data       Assessment/Plan:       #1 chest pain #2 history of CAD status post CABG with normal stress test 2018 #3 hypertension with hypertensive heart disease #4 congestive heart failure in the base of diastolic dysfunction compensated #5 history of pulmonary hypertension #6 metabolic syndrome with history of hypertension, hyperlipidemia, diabetes    59 years old patient with a background history of CAD status post CABG with a normal previous stress test, hypertension, hypertensive heart disease, metabolic syndrome, pulmonary hypertension admitted evaluation chest pain appropriate medical management.  EKG and cardiac enzymes are negative.  And she is resting comfortably in bed.  Given the previous normal stress test, symptom complex, multiple risk factors seemed appropriate to further risk stratify with a CT coronary angiogram.  Procedure risk discussed with the patient pros and cons explained.  Understand willing to proceed forward.  Risk factor lifestyle modification discussed with the patient.  Lipitor for management of hyperlipidemia aspirin Plavix with history of CAD and CABG insulin sliding scale for management of diabetes and hypertension's been treated with metoprolol XL.  Further recommendation based on patient clinical condition hospital course and outcome of CT coronary angiogram.  Thank you for allowing me to participate in the care of Ariana Martinez. Feel free to contact me directly with any further questions or concerns.    Justus Jim MD  07/12/19  4:39 PM.      Part of this note may be an electronic transcription/translation of spoken language to printed text using the Dragon Dictation system.

## 2019-07-12 NOTE — PLAN OF CARE
Problem: Pain, Chronic (Adult)  Goal: Identify Related Risk Factors and Signs and Symptoms  Outcome: Ongoing (interventions implemented as appropriate)    Goal: Acceptable Pain/Comfort Level and Functional Ability  Outcome: Ongoing (interventions implemented as appropriate)      Problem: Fall Risk (Adult)  Goal: Identify Related Risk Factors and Signs and Symptoms  Outcome: Ongoing (interventions implemented as appropriate)    Goal: Absence of Fall  Outcome: Ongoing (interventions implemented as appropriate)      Problem: Patient Care Overview  Goal: Discharge Needs Assessment  Outcome: Ongoing (interventions implemented as appropriate)      Problem: Cardiac: ACS (Acute Coronary Syndrome) (Adult)  Goal: Signs and Symptoms of Listed Potential Problems Will be Absent, Minimized or Managed (Cardiac: ACS)  Outcome: Ongoing (interventions implemented as appropriate)

## 2019-07-12 NOTE — H&P
[facility] Medicine Admission      Date of Admission: 7/12/2019      Primary Care Physician: Francisca Fong MD      Chief Complaint: Chest pain    HPI: This 59-year-old  female with a known history of coronary artery disease status post CABG in 2005 reported to the emergency department with sudden onset chest pain.  Patient reports that the chest pain is been present for approximately 3 hours and it is substernal radiating into her neck and shoulder.  Stated that the pain medication helped it.  Also reports that aspirin seemed to help some, but no other aggravating or relieving factors.  Denies increasing pain on exertion, denies diaphoresis, denies orthopnea, denies dyspnea upon exertion.  Denies shortness of air, fever, chills.  States that she did feel lightheaded and presyncopal.  No focal weakness, no slurring of speech, drooping of face.  No recent illness.  Had an abnormal stress test in 2015 revealing anterior wall ischemia as well as a large inferior wall abnormality.  Patient also has a history of heart failure with preserved ejection fraction and pulmonary hypertension.  Normally sees Dr. Kwon, however Dr. Kwon is unavailable at this time and will defer to on-call cardiology.    Concurrent Medical History:  has a past medical history of Angina, class IV (CMS/HCC), Anxiety, Benign paroxysmal positional vertigo, Carpal tunnel syndrome, Chronic pain, Coronary atherosclerosis, Depression, Diabetes mellitus (CMS/HCC), Diabetic polyneuropathy (CMS/HCC), Elevated cholesterol, Female stress incontinence, Gastroparesis, GERD (gastroesophageal reflux disease), Hyperlipidemia, Hypertension, Low back pain, Malaise and fatigue, Multiple joint pain, Obesity, Otalgia, Perforation of tympanic membrane, Postoperative wound infection, and Vitamin D deficiency.    Past Surgical History:  has a past surgical history that includes Abdominal surgery;  Angioplasty; Coronary artery bypass graft (2005); Cardiac catheterization; Carpal tunnel release; Cholecystectomy; endoscopy and colonoscopy; Mouth surgery; transesophageal echocardiogram ([place]); Foot surgery; gastric banding; Hysterectomy; Shoulder surgery; Salpingoophorectomy; Cardiac catheterization (N/A, 6/20/2017); Breast biopsy (Right); Esophagogastroduodenoscopy (N/A, 10/19/2018); and subtalar arthrodesis (Left, 1/16/2019).    Family History: family history includes Diabetes in her other, sister, sister, sister, sister, sister, and sister; Heart disease in her mother, other, sister, and sister; Hypertension in her mother, other, sister, and sister; Stroke in her mother.    Social History:  reports that she has never smoked. She has never used smokeless tobacco. She reports that she does not drink alcohol or use drugs.    Allergies:   Allergies   Allergen Reactions   • Seroquel [Quetiapine Fumarate] Anaphylaxis   • Seroquel [Quetiapine] Anaphylaxis   • Avandia [Rosiglitazone] Swelling   • Morphine And Related Hallucinations   • Oxycodone Hallucinations       Medications:   Prior to Admission medications    Medication Sig Start Date End Date Taking? Authorizing Provider   ARIPiprazole (ABILIFY) 15 MG tablet Take 1 tablet by mouth Daily. 3/14/19  Yes Francisca Fong MD   aspirin 81 MG chewable tablet Chew 81 mg daily.   Yes Provider, MD Esther   atorvastatin (LIPITOR) 40 MG tablet Take 1 tablet by mouth Every Night. 2/27/19  Yes Elvis Gamboa APRN B-D ULTRAFINE III SHORT PEN 31G X 8 MM misc INJECT 1 EACH INTO THE SHOULDER, THIGH, OR BUTTOCKS 4 (FOUR) TIMES A DAY. USE AS DIRECTED 6/19/19  Yes Francisca Fong MD   BD SHARPS CONTAINER HOME misc 1 each Take As Directed. 9/7/18  Yes Francisca Fong MD   Blood Glucose Monitoring Suppl (ONE TOUCH ULTRA MINI) w/Device kit USE AS DIRECTED TO CHECK BLOOD SUGAR 7/11/18  Yes Francisca Fong MD   Calcium Citrate-Vitamin D  (CITRACAL/VITAMIN D) 250-200 MG-UNIT tablet Take 2 tablets by mouth 2 (two) times a day.   Yes Esther Henao MD   clopidogrel (PLAVIX) 75 MG tablet Take 75 mg by mouth Daily.   Yes Esther Henao MD   cyanocobalamin 1000 MCG/ML injection Inject 1 mL into the appropriate muscle as directed by prescriber Every 28 (Twenty-Eight) Days. 12/26/18  Yes Radha Schofield APRN   furosemide (LASIX) 40 MG tablet Take 1 tablet by mouth Daily. 2/8/19  Yes Francisca Fong MD   gabapentin (NEURONTIN) 400 MG capsule TAKE 1 CAPSULE BY MOUTH THREE TIMES A DAY 4/8/19  Yes Geri Baig MD   GLUCAGON EMERGENCY 1 MG injection USE AS DIRECTED AS NEEDED 7/28/17  Yes Elvis Gamboa APRN   glucose blood (ONE TOUCH ULTRA TEST) test strip 1 each by Other route 4 (Four) Times a Day. Use as instructed 6/14/18  Yes Francisca Fong MD   hydrochlorothiazide (MICROZIDE) 12.5 MG capsule TAKE 1 CAPSULE BY MOUTH DAILY. 6/6/19  Yes Elvis Gamboa APRN   HYDROcodone-acetaminophen (NORCO) 7.5-325 MG per tablet Take 1 tablet by mouth Every 4 (Four) Hours As Needed for Moderate Pain  or Severe Pain . 6/27/19  Yes Francisca Fong MD   insulin aspart (novoLOG FLEXPEN) 100 UNIT/ML solution pen-injector sc pen Inject 60 Units under the skin into the appropriate area as directed 3 (Three) Times a Day With Meals.   Yes Esther Henao MD   insulin aspart (NOVOLOG FLEXPEN) 100 UNIT/ML solution pen-injector sc pen INJECT 60 UNITS BEFORE MEALS 3 TIMES A DAY 4/11/19  Yes Baldemar Melendez MD   Insulin Glargine (BASAGLAR KWIKPEN) 100 UNIT/ML injection pen Inject 100 Units under the skin into the appropriate area as directed Every Night. 7/11/19  Yes Elvis Gamboa APRN   LORazepam (ATIVAN) 0.5 MG tablet Take 1 tablet by mouth Daily As Needed for Anxiety. Fill when due 4/29/19  Yes Kimberly Ferguson APRN   meclizine (ANTIVERT) 25 MG tablet Take 1 tablet by mouth 3 (Three) Times a Day As  "Needed for dizziness. 12/14/17  Yes Evon Hernandez APRN   metoclopramide (REGLAN) 10 MG tablet Take 10 mg by mouth 4 (Four) Times a Day As Needed.   Yes Esther Henao MD   metoprolol succinate XL (TOPROL-XL) 25 MG 24 hr tablet TAKE 1 TABLET BY MOUTH DAILY. 5/20/19  Yes Can Kwon MD PhD   mirtazapine (REMERON) 45 MG tablet TAKE 1 TABLET BY MOUTH EVERY NIGHT. 1/23/19  Yes Francisca Fong MD   omeprazole (PRILOSEC) 40 MG capsule Take 1 capsule by mouth Daily. 3/13/19  Yes Francisca Fong MD   ONE TOUCH ULTRA TEST test strip USE TO TEST SUGAR 4 TIMES A DAY 11/15/18  Yes Francisca Fong MD   phenazopyridine (PYRIDIUM) 200 MG tablet Take 1 tablet by mouth 3 (Three) Times a Day As Needed for bladder spasms. 3/30/19  Yes Neeraj Jain MD   Syringe/Needle, Disp, (LUER LOCK SAFETY SYRINGES) 25G X 5/8\" 3 ML misc 1 each Daily. 9/7/18  Yes Francisca Fong MD   venlafaxine XR (EFFEXOR-XR) 150 MG 24 hr capsule Take 1 capsule by mouth 2 (Two) Times a Day. 6/28/19  Yes Francisca Fong MD   vitamin D (ERGOCALCIFEROL) 24856 units capsule capsule Take 50,000 Units by mouth 1 (One) Time Per Week. sunday   Yes Esther Henao MD   gabapentin (NEURONTIN) 400 MG capsule Take 1 capsule by mouth 3 (Three) Times a Day. Fill when due 4/29/19   Kimberly Ferguson APRN   meloxicam (MOBIC) 15 MG tablet Take 1 tablet by mouth Daily. Take once daily. 11/30/18   Ignacio Lord DPM   SANTYL 250 UNIT/GM ointment APPLY TO AFFECTED AREA DAILY IN NICKEL INCH THICKNESS 2/14/19   Esther Henao MD   vitamin D (ERGOCALCIFEROL) 95080 units capsule capsule TAKE ONE CAPSULE BY MOUTH EVERY SUNDAY 4/17/19   Ethel Landaverde PA-C       Review of Systems:  Review of Systems   Constitutional: Positive for fatigue. Negative for chills, diaphoresis and fever.   Respiratory: Negative for cough, chest tightness, shortness of breath and wheezing.    Cardiovascular: Positive for chest pain. " Negative for palpitations and leg swelling.   Gastrointestinal: Positive for nausea. Negative for abdominal pain, constipation, diarrhea and vomiting.   Genitourinary: Negative for difficulty urinating and dysuria.   Neurological: Positive for dizziness and light-headedness. Negative for tremors, seizures, syncope, facial asymmetry, speech difficulty, numbness and headaches.   Psychiatric/Behavioral: Negative for confusion.      Otherwise complete ROS is negative except as mentioned above.    Physical Exam:   Temp:  [98.1 °F (36.7 °C)] 98.1 °F (36.7 °C)  Heart Rate:  [66-70] 70  Resp:  [18] 18  BP: (108-124)/(58-71) 124/58  Physical Exam   Constitutional: She is oriented to person, place, and time. She appears well-developed and well-nourished.   HENT:   Head: Normocephalic and atraumatic.   Eyes: Conjunctivae and EOM are normal. Pupils are equal, round, and reactive to light.   Cardiovascular: Normal rate and regular rhythm.   Pulmonary/Chest: Effort normal and breath sounds normal. No stridor. No respiratory distress. She has no wheezes.   Abdominal: Soft. Bowel sounds are normal. She exhibits no distension. There is no tenderness.   Musculoskeletal: She exhibits no edema.   Neurological: She is alert and oriented to person, place, and time. No cranial nerve deficit.   Skin: Skin is warm and dry. Capillary refill takes less than 2 seconds.   Psychiatric: She has a normal mood and affect. Her behavior is normal.     Results Reviewed:  I have personally reviewed current lab, radiology, and data and agree with results.  Lab Results (last 24 hours)     Procedure Component Value Units Date/Time    [facility] Draw [[id number]] Collected:  07/12/19 0927    Specimen:  Blood Updated:  07/12/19 1030    Narrative:       The following orders were created for panel order [facility] Draw.  Procedure                               Abnormality         Status                     ---------                               -----------          ------                     Light Blue Top[[id number]]                                   Final result               Green Top (Gel)[[id number]]                                  Final result               Lavender Top[[id number]]                                     Final result               Gold Top - SST[[id number]]                                   Final result                 Please view results for these tests on the individual orders.    Light Blue Top [[id number]] Collected:  07/12/19 0927    Specimen:  Blood Updated:  07/12/19 1030     Extra Tube hold for add-on     Comment: Auto resulted       Green Top (Gel) [[id number]] Collected:  07/12/19 0927    Specimen:  Blood Updated:  07/12/19 1030     Extra Tube Hold for add-ons.     Comment: Auto resulted.       Gold Top - SST [[id number]] Collected:  07/12/19 0927    Specimen:  Blood Updated:  07/12/19 1030     Extra Tube Hold for add-ons.     Comment: Auto resulted.       Lavender Top [[id number]] Collected:  07/12/19 0927    Specimen:  Blood Updated:  07/12/19 1030     Extra Tube hold for add-on     Comment: Auto resulted       Comprehensive Metabolic Panel [[id number]]  (Abnormal) Collected:  07/12/19 0927    Specimen:  Blood Updated:  07/12/19 1025     Glucose 190 mg/dL      BUN 16 mg/dL      Creatinine 1.11 mg/dL      Sodium 139 mmol/L      Potassium 3.5 mmol/L      Chloride 99 mmol/L      CO2 25.0 mmol/L      Calcium 9.6 mg/dL      Total Protein 7.0 g/dL      Albumin 4.20 g/dL      ALT (SGPT) 17 U/L      AST (SGOT) 16 U/L      Alkaline Phosphatase 147 U/L      Total Bilirubin <0.2 mg/dL      eGFR Non African Amer 51 mL/min/1.73      Globulin 2.8 gm/dL      A/G Ratio 1.5 g/dL      BUN/Creatinine Ratio 14.4     Anion Gap 15.0 mmol/L     Narrative:       GFR Normal >60  Chronic Kidney Disease <60  Kidney Failure <15    Troponin [[id number]]  (Normal) Collected:  07/12/19 0927    Specimen:  Blood Updated:  07/12/19 1022     Troponin T <0.010 ng/mL     Narrative:        Troponin T Reference Range:  <= 0.03 ng/mL-   Negative for AMI  >0.03 ng/mL-     Abnormal for myocardial necrosis.  Clinicians would have to utilize clinical acumen, EKG, Troponin and serial changes to determine if it is an Acute Myocardial Infarction or myocardial injury due to an underlying chronic condition.     BNP [[id number]]  (Normal) Collected:  07/12/19 0927    Specimen:  Blood Updated:  07/12/19 1021     proBNP 82.3 pg/mL     Narrative:       Among patients with dyspnea, NT-proBNP is highly sensitive for the detection of acute congestive heart failure. In addition NT-proBNP of <300 pg/ml effectively rules out acute congestive heart failure with 99% negative predictive value.    CBC & Differential [[id number]] Collected:  07/12/19 0927    Specimen:  Blood Updated:  07/12/19 0942    Narrative:       The following orders were created for panel order CBC & Differential.  Procedure                               Abnormality         Status                     ---------                               -----------         ------                     CBC Auto Differential[[id number]]        Abnormal            Final result                 Please view results for these tests on the individual orders.    CBC Auto Differential [[id number]]  (Abnormal) Collected:  07/12/19 0927    Specimen:  Blood Updated:  07/12/19 0942     WBC 12.72 10*3/mm3      RBC 4.51 10*6/mm3      Hemoglobin 12.5 g/dL      Hematocrit 38.1 %      MCV 84.5 fL      MCH 27.7 pg      MCHC 32.8 g/dL      RDW 14.1 %      RDW-SD 43.0 fl      MPV 9.7 fL      Platelets 396 10*3/mm3      Neutrophil % 63.9 %      Lymphocyte % 26.2 %      Monocyte % 5.5 %      Eosinophil % 3.6 %      Basophil % 0.4 %      Immature Grans % 0.4 %      Neutrophils, Absolute 8.13 10*3/mm3      Lymphocytes, Absolute 3.33 10*3/mm3      Monocytes, Absolute 0.70 10*3/mm3      Eosinophils, Absolute 0.46 10*3/mm3      Basophils, Absolute 0.05 10*3/mm3      Immature Grans, Absolute 0.05  10*3/mm3      nRBC 0.0 /100 WBC         Imaging Results (last 24 hours)     Procedure Component Value Units Date/Time    XR Chest 1 View [[id number]] Collected:  07/12/19 0930     Updated:  07/12/19 0955    Narrative:       PROCEDURE: Portable chest x-ray    TECHNIQUE: Single AP view of the chest    COMPARISON: 7/21/2018    HISTORY: Chest pain protocol  chest pain protocol    FINDINGS:     Life-support devices: None    Lungs/pleura: Elevated right hemidiaphragm. No overt pulmonary  vascular congestion, focal pulmonary parenchymal opacity, pleural  effusion or pneumothorax.    Heart, hilar and mediastinal structures: Sternal suture wires  appear unchanged. Cardiac silhouette is normal in size.    Surgical clips again noted in the right upper quadrant of the  abdomen.      Impression:       CONCLUSION:  No acute pulmonary disease.    Electronically signed by:  Vamshi Rose MD  7/12/2019 9:54 AM CDT  Workstation: [id number]            Assessment:    Active Hospital Problems    Diagnosis   • Chest pain   Known coronary artery disease status post CABG in 2005  Hyperlipidemia  Hypertension  Diabetes mellitus type II with hyperglycemia          Plan: Trend cardiac enzymes, echocardiogram, cardiology consult, sliding scale insulin and long-acting insulin as appropriate, SCD and venous foot pumps, restart home medications as appropriate, continue as hospital course dictates.            This document has been electronically signed by CHELSEA Garcia on July 12, 2019 11:52 AM

## 2019-07-12 NOTE — ED PROVIDER NOTES
Subjective   Patient presents to emergency department for chest pain.  States this started 1 hour prior to arrival.  Describes chest pain as sharp mid chest pain with associates tingling into right shoulder/neck and nausea, dizziness.  Denies syncope, vomiting, diaphoresis, jaw pain, abdominal pain, fever, chills.  Hx of previous CABG 14 years ago, DM, HTN, HLD.  Her cardiac disease is followed by Dr Kwon.          History provided by:  Patient   used: No    Chest Pain   Pain location:  Substernal area  Pain quality: sharp    Pain radiates to:  Does not radiate  Pain severity:  Moderate  Onset quality:  Sudden  Duration:  1 hour  Timing:  Constant  Progression:  Unchanged  Chronicity:  New  Context: at rest    Associated symptoms: no abdominal pain, no back pain, no dysphagia, no fever, no shortness of breath and no weakness    Risk factors: coronary artery disease, diabetes mellitus, high cholesterol, hypertension and obesity        Review of Systems   Constitutional: Negative for chills and fever.   HENT: Negative for sore throat and trouble swallowing.    Eyes: Negative for visual disturbance.   Respiratory: Negative for shortness of breath and wheezing.    Cardiovascular: Positive for chest pain.   Gastrointestinal: Negative for abdominal pain.   Genitourinary: Negative for dysuria and flank pain.   Musculoskeletal: Negative for back pain.   Skin: Negative for color change.   Allergic/Immunologic: Negative for immunocompromised state.   Neurological: Negative for syncope and weakness.   Hematological: Does not bruise/bleed easily.   Psychiatric/Behavioral: Negative for confusion.       Past Medical History:   Diagnosis Date   • Angina, class IV (CMS/HCC)    • Anxiety    • Benign paroxysmal positional vertigo    • Carpal tunnel syndrome    • Chronic pain    • Coronary atherosclerosis     hx CABG 2005.  is followed by Dr Kwon   • Depression    • Diabetes mellitus (CMS/HCC)     Type 2,  controlled   • Diabetic polyneuropathy (CMS/HCC)    • Elevated cholesterol    • Female stress incontinence    • Gastroparesis    • GERD (gastroesophageal reflux disease)    • Hyperlipidemia    • Hypertension    • Low back pain    • Malaise and fatigue    • Multiple joint pain    • Obesity     Refuses to be weighed   • Otalgia     Both   • Perforation of tympanic membrane     Left   • Postoperative wound infection    • Vitamin D deficiency        Allergies   Allergen Reactions   • Seroquel [Quetiapine Fumarate] Anaphylaxis   • Seroquel [Quetiapine] Anaphylaxis   • Avandia [Rosiglitazone] Swelling   • Morphine And Related Hallucinations   • Oxycodone Hallucinations       Past Surgical History:   Procedure Laterality Date   • ABDOMINAL SURGERY     • ANGIOPLASTY      coronary   • BREAST BIOPSY Right    • CARDIAC CATHETERIZATION     • CARDIAC CATHETERIZATION N/A 6/20/2017    Procedure: Right Heart Cath;  Surgeon: Can Kwon MD PhD;  Location: [facility] CATH INVASIVE LOCATION;  Service:    • CARPAL TUNNEL RELEASE     • CHOLECYSTECTOMY     • CORONARY ARTERY BYPASS GRAFT  2005   • ENDOSCOPY N/A 10/19/2018    Procedure: ESOPHAGOGASTRODUODENOSCOPY possible dilation;  Surgeon: Julián Maldonado MD;  Location: [facility] ENDOSCOPY;  Service: Gastroenterology   • ENDOSCOPY AND COLONOSCOPY     • FOOT SURGERY      Toes   • GASTRIC BANDING      Revision, laparoscopic   • HYSTERECTOMY     • MOUTH SURGERY     • SALPINGO OOPHORECTOMY     • SHOULDER SURGERY     • SUBTALAR ARTHRODESIS Left 1/16/2019    Procedure: LEFT FOOT HARDWARE REMOVAL, FIFTH METATARSAL , OPEN REDUCTION INTERNAL FIXATION, CALCANEAL OSTEOTOMY;  Surgeon: Ignacio Lord DPM;  Location: [facility] OR;  Service: Podiatry   • TRANSESOPHAGEAL ECHOCARDIOGRAM (LAMONTE)      With color flow       Family History   Problem Relation Age of Onset   • Diabetes Other    • Heart disease Other    • Hypertension Other    • Heart disease Mother    • Stroke Mother    • Hypertension  "Mother    • Diabetes Sister    • Heart disease Sister    • Hypertension Sister    • Heart disease Sister    • Diabetes Sister    • Hypertension Sister    • Diabetes Sister    • Diabetes Sister    • Diabetes Sister    • Diabetes Sister        Social History     Socioeconomic History   • Marital status:      Spouse name: Not on file   • Number of children: Not on file   • Years of education: Not on file   • Highest education level: Not on file   Tobacco Use   • Smoking status: Never Smoker   • Smokeless tobacco: Never Used   Substance and Sexual Activity   • Alcohol use: No   • Drug use: No   • Sexual activity: Defer           Objective      /58 (Patient Position: Lying)   Pulse 70   Temp 98.1 °F (36.7 °C) (Oral)   Resp 18   Ht 172.7 cm (68\")   Wt 121 kg (267 lb)   SpO2 95%   BMI 40.60 kg/m²     Physical Exam   Constitutional: She is oriented to person, place, and time. She appears well-developed and well-nourished.   HENT:   Head: Normocephalic and atraumatic.   Eyes: Conjunctivae are normal.   Cardiovascular: Normal rate, regular rhythm, normal heart sounds and intact distal pulses.   Pulmonary/Chest: Effort normal and breath sounds normal. No respiratory distress. She has no wheezes.   Abdominal: Soft. She exhibits no distension. There is no tenderness.   Musculoskeletal: She exhibits no edema.   Neurological: She is alert and oriented to person, place, and time.   Skin: Skin is warm. Capillary refill takes less than 2 seconds.   Psychiatric: She has a normal mood and affect. Her behavior is normal. Thought content normal.   Nursing note and vitals reviewed.      ECG 12 Lead    Date/Time: 7/12/2019 10:23 AM  Performed by: Ben Porter PA-C  Authorized by: Sanford Gamez MD   Interpreted by physician  Comparison: compared with previous ECG from 1/16/2019  Similar to previous ECG  Rhythm: sinus rhythm  Rate: normal  BPM: 72  ST Segments: ST segments normal  Clinical impression: " normal ECG                 ED Course      Results for orders placed or performed during the hospital encounter of 07/12/19   Troponin   Result Value Ref Range    Troponin T <0.010 0.000 - 0.030 ng/mL   Comprehensive Metabolic Panel   Result Value Ref Range    Glucose 190 (H) 65 - 99 mg/dL    BUN 16 6 - 20 mg/dL    Creatinine 1.11 (H) 0.57 - 1.00 mg/dL    Sodium 139 136 - 145 mmol/L    Potassium 3.5 3.5 - 5.2 mmol/L    Chloride 99 98 - 107 mmol/L    CO2 25.0 22.0 - 29.0 mmol/L    Calcium 9.6 8.6 - 10.5 mg/dL    Total Protein 7.0 6.0 - 8.5 g/dL    Albumin 4.20 3.50 - 5.20 g/dL    ALT (SGPT) 17 1 - 33 U/L    AST (SGOT) 16 1 - 32 U/L    Alkaline Phosphatase 147 (H) 39 - 117 U/L    Total Bilirubin <0.2 (L) 0.2 - 1.2 mg/dL    eGFR Non African Amer 51 (L) >60 mL/min/1.73    Globulin 2.8 gm/dL    A/G Ratio 1.5 g/dL    BUN/Creatinine Ratio 14.4 7.0 - 25.0    Anion Gap 15.0 5.0 - 15.0 mmol/L   BNP   Result Value Ref Range    proBNP 82.3 5.0 - 900.0 pg/mL   CBC Auto Differential   Result Value Ref Range    WBC 12.72 (H) 3.40 - 10.80 10*3/mm3    RBC 4.51 3.77 - 5.28 10*6/mm3    Hemoglobin 12.5 12.0 - 15.9 g/dL    Hematocrit 38.1 34.0 - 46.6 %    MCV 84.5 79.0 - 97.0 fL    MCH 27.7 26.6 - 33.0 pg    MCHC 32.8 31.5 - 35.7 g/dL    RDW 14.1 12.3 - 15.4 %    RDW-SD 43.0 37.0 - 54.0 fl    MPV 9.7 6.0 - 12.0 fL    Platelets 396 140 - 450 10*3/mm3    Neutrophil % 63.9 42.7 - 76.0 %    Lymphocyte % 26.2 19.6 - 45.3 %    Monocyte % 5.5 5.0 - 12.0 %    Eosinophil % 3.6 0.3 - 6.2 %    Basophil % 0.4 0.0 - 1.5 %    Immature Grans % 0.4 0.0 - 0.5 %    Neutrophils, Absolute 8.13 (H) 1.70 - 7.00 10*3/mm3    Lymphocytes, Absolute 3.33 (H) 0.70 - 3.10 10*3/mm3    Monocytes, Absolute 0.70 0.10 - 0.90 10*3/mm3    Eosinophils, Absolute 0.46 (H) 0.00 - 0.40 10*3/mm3    Basophils, Absolute 0.05 0.00 - 0.20 10*3/mm3    Immature Grans, Absolute 0.05 0.00 - 0.05 10*3/mm3    nRBC 0.0 0.0 - 0.2 /100 WBC   Light Blue Top   Result Value Ref Range    Extra  Tube hold for add-on    Green Top (Gel)   Result Value Ref Range    Extra Tube Hold for add-ons.    Lavender Top   Result Value Ref Range    Extra Tube hold for add-on    Gold Top - SST   Result Value Ref Range    Extra Tube Hold for add-ons.      Xr Foot 3+ View Left    Result Date: 7/1/2019  Narrative: Exam: 3 view weightbearing radiographs left foot Comparison Studies: 5/10/2019 Indication: Postoperative study Findings: Mild osteopenia.  No acute fractures, erosions or subluxations.  Redemonstration of open reduction internal fixation of left fifth metatarsal fracture.  There has been dissolution of the fracture lines indicative of fracture healing.  Hardware is stable without evidence of loosening or failure.  There is persistent displacement of posterior calcaneal tuberosity, however this does appear well ossified on today's exam.  Its alignment is stable.  No additional evidence of hardware loosening or failure within the calcaneus. Result: As above.  Stable postoperative exam.     Xr Calcaneus 2+ View Left    Result Date: 7/1/2019  Narrative: Exam: 2 view radiographs left calcaneus Comparison Studies: 5/10/2018 Indication: Postoperative study Findings: Normal osseous density.  No acute fracture, erosions or subluxations.  Redemonstration of calcaneal body osteotomy with subsequent avulsion fracture of dorsal posterior tuberosity.  The osteotomy and fracture site appears well-healed.  Hardware remains in place without evidence of loosening or failure.  Alignment is maintained.  No additional significant interval changes. Result: As above.  Stable postoperative exam.     Xr Chest 1 View    Result Date: 7/12/2019  Narrative: PROCEDURE: Portable chest x-ray TECHNIQUE: Single AP view of the chest COMPARISON: 7/21/2018 HISTORY: Chest pain protocol chest pain protocol FINDINGS:  Life-support devices: None Lungs/pleura: Elevated right hemidiaphragm. No overt pulmonary vascular congestion, focal pulmonary parenchymal  opacity, pleural effusion or pneumothorax. Heart, hilar and mediastinal structures: Sternal suture wires appear unchanged. Cardiac silhouette is normal in size. Surgical clips again noted in the right upper quadrant of the abdomen.     Impression: CONCLUSION: No acute pulmonary disease. Electronically signed by:  Vamshi Rose MD  7/12/2019 9:54 AM CDT Workstation: [id number]    Patient admitted to hospitalist.            [facility]      Final diagnoses:   Chest pain, unspecified type            Ben Porter PA-C  07/12/19 1134

## 2019-07-13 ENCOUNTER — APPOINTMENT (OUTPATIENT)
Dept: CT IMAGING | Facility: HOSPITAL | Age: 59
End: 2019-07-13

## 2019-07-13 VITALS
RESPIRATION RATE: 18 BRPM | WEIGHT: 262 LBS | TEMPERATURE: 96.8 F | OXYGEN SATURATION: 99 % | HEIGHT: 68 IN | HEART RATE: 68 BPM | BODY MASS INDEX: 39.71 KG/M2 | SYSTOLIC BLOOD PRESSURE: 141 MMHG | DIASTOLIC BLOOD PRESSURE: 72 MMHG

## 2019-07-13 LAB
ALBUMIN SERPL-MCNC: 3.4 G/DL (ref 3.5–5.2)
ALBUMIN/GLOB SERPL: 1.1 G/DL
ALP SERPL-CCNC: 125 U/L (ref 39–117)
ALT SERPL W P-5'-P-CCNC: 16 U/L (ref 1–33)
ANION GAP SERPL CALCULATED.3IONS-SCNC: 12 MMOL/L (ref 5–15)
AST SERPL-CCNC: 14 U/L (ref 1–32)
BASOPHILS # BLD AUTO: 0.05 10*3/MM3 (ref 0–0.2)
BASOPHILS NFR BLD AUTO: 0.6 % (ref 0–1.5)
BILIRUB SERPL-MCNC: 0.2 MG/DL (ref 0.2–1.2)
BUN BLD-MCNC: 13 MG/DL (ref 6–20)
BUN/CREAT SERPL: 13.4 (ref 7–25)
CALCIUM SPEC-SCNC: 8.9 MG/DL (ref 8.6–10.5)
CHLORIDE SERPL-SCNC: 103 MMOL/L (ref 98–107)
CHOLEST SERPL-MCNC: 172 MG/DL (ref 0–200)
CO2 SERPL-SCNC: 27 MMOL/L (ref 22–29)
CREAT BLD-MCNC: 0.97 MG/DL (ref 0.57–1)
DEPRECATED RDW RBC AUTO: 43.5 FL (ref 37–54)
EOSINOPHIL # BLD AUTO: 0.37 10*3/MM3 (ref 0–0.4)
EOSINOPHIL NFR BLD AUTO: 4.5 % (ref 0.3–6.2)
ERYTHROCYTE [DISTWIDTH] IN BLOOD BY AUTOMATED COUNT: 14.1 % (ref 12.3–15.4)
GFR SERPL CREATININE-BSD FRML MDRD: 59 ML/MIN/1.73
GLOBULIN UR ELPH-MCNC: 3.2 GM/DL
GLUCOSE BLD-MCNC: 181 MG/DL (ref 65–99)
GLUCOSE BLDC GLUCOMTR-MCNC: 142 MG/DL (ref 70–130)
GLUCOSE BLDC GLUCOMTR-MCNC: 149 MG/DL (ref 70–130)
GLUCOSE BLDC GLUCOMTR-MCNC: 168 MG/DL (ref 70–130)
HCT VFR BLD AUTO: 38.8 % (ref 34–46.6)
HDLC SERPL-MCNC: 37 MG/DL (ref 40–60)
HGB BLD-MCNC: 12.6 G/DL (ref 12–15.9)
IMM GRANULOCYTES # BLD AUTO: 0.03 10*3/MM3 (ref 0–0.05)
IMM GRANULOCYTES NFR BLD AUTO: 0.4 % (ref 0–0.5)
LDLC SERPL CALC-MCNC: 92 MG/DL (ref 0–100)
LDLC/HDLC SERPL: 2.49 {RATIO}
LYMPHOCYTES # BLD AUTO: 2.43 10*3/MM3 (ref 0.7–3.1)
LYMPHOCYTES NFR BLD AUTO: 29.5 % (ref 19.6–45.3)
MCH RBC QN AUTO: 27.8 PG (ref 26.6–33)
MCHC RBC AUTO-ENTMCNC: 32.5 G/DL (ref 31.5–35.7)
MCV RBC AUTO: 85.5 FL (ref 79–97)
MONOCYTES # BLD AUTO: 0.56 10*3/MM3 (ref 0.1–0.9)
MONOCYTES NFR BLD AUTO: 6.8 % (ref 5–12)
NEUTROPHILS # BLD AUTO: 4.81 10*3/MM3 (ref 1.7–7)
NEUTROPHILS NFR BLD AUTO: 58.2 % (ref 42.7–76)
NRBC BLD AUTO-RTO: 0 /100 WBC (ref 0–0.2)
PLATELET # BLD AUTO: 370 10*3/MM3 (ref 140–450)
PMV BLD AUTO: 9.6 FL (ref 6–12)
POTASSIUM BLD-SCNC: 3.9 MMOL/L (ref 3.5–5.2)
PROT SERPL-MCNC: 6.6 G/DL (ref 6–8.5)
RBC # BLD AUTO: 4.54 10*6/MM3 (ref 3.77–5.28)
SODIUM BLD-SCNC: 142 MMOL/L (ref 136–145)
TRIGL SERPL-MCNC: 214 MG/DL (ref 0–150)
TROPONIN T SERPL-MCNC: <0.01 NG/ML (ref 0–0.03)
VLDLC SERPL-MCNC: 42.8 MG/DL
WBC NRBC COR # BLD: 8.25 10*3/MM3 (ref 3.4–10.8)

## 2019-07-13 PROCEDURE — 82962 GLUCOSE BLOOD TEST: CPT

## 2019-07-13 PROCEDURE — G0378 HOSPITAL OBSERVATION PER HR: HCPCS

## 2019-07-13 PROCEDURE — 96376 TX/PRO/DX INJ SAME DRUG ADON: CPT

## 2019-07-13 PROCEDURE — 75574 CT ANGIO HRT W/3D IMAGE: CPT

## 2019-07-13 PROCEDURE — 80061 LIPID PANEL: CPT | Performed by: FAMILY MEDICINE

## 2019-07-13 PROCEDURE — 0 IOPAMIDOL PER 1 ML: Performed by: FAMILY MEDICINE

## 2019-07-13 PROCEDURE — 63710000001 INSULIN ASPART PER 5 UNITS: Performed by: FAMILY MEDICINE

## 2019-07-13 PROCEDURE — 25010000002 ONDANSETRON PER 1 MG: Performed by: FAMILY MEDICINE

## 2019-07-13 PROCEDURE — 85025 COMPLETE CBC W/AUTO DIFF WBC: CPT | Performed by: FAMILY MEDICINE

## 2019-07-13 PROCEDURE — 80053 COMPREHEN METABOLIC PANEL: CPT | Performed by: FAMILY MEDICINE

## 2019-07-13 PROCEDURE — 99213 OFFICE O/P EST LOW 20 MIN: CPT | Performed by: INTERNAL MEDICINE

## 2019-07-13 PROCEDURE — 84484 ASSAY OF TROPONIN QUANT: CPT | Performed by: FAMILY MEDICINE

## 2019-07-13 RX ORDER — RANOLAZINE 500 MG/1
500 TABLET, EXTENDED RELEASE ORAL EVERY 12 HOURS SCHEDULED
Status: DISCONTINUED | OUTPATIENT
Start: 2019-07-13 | End: 2019-07-13 | Stop reason: HOSPADM

## 2019-07-13 RX ORDER — RANOLAZINE 500 MG/1
500 TABLET, EXTENDED RELEASE ORAL EVERY 12 HOURS SCHEDULED
Qty: 60 TABLET | Refills: 1 | Status: SHIPPED | OUTPATIENT
Start: 2019-07-13 | End: 2019-08-21

## 2019-07-13 RX ORDER — ISOSORBIDE MONONITRATE 30 MG/1
30 TABLET, EXTENDED RELEASE ORAL
Qty: 30 TABLET | Refills: 1 | Status: SHIPPED | OUTPATIENT
Start: 2019-07-14 | End: 2019-08-21

## 2019-07-13 RX ORDER — ISOSORBIDE MONONITRATE 30 MG/1
30 TABLET, EXTENDED RELEASE ORAL
Status: DISCONTINUED | OUTPATIENT
Start: 2019-07-13 | End: 2019-07-13 | Stop reason: HOSPADM

## 2019-07-13 RX ADMIN — METOPROLOL SUCCINATE 25 MG: 25 TABLET, EXTENDED RELEASE ORAL at 08:47

## 2019-07-13 RX ADMIN — ISOSORBIDE MONONITRATE 30 MG: 30 TABLET, EXTENDED RELEASE ORAL at 15:46

## 2019-07-13 RX ADMIN — GABAPENTIN 400 MG: 400 CAPSULE ORAL at 15:46

## 2019-07-13 RX ADMIN — PANTOPRAZOLE SODIUM 40 MG: 40 TABLET, DELAYED RELEASE ORAL at 06:18

## 2019-07-13 RX ADMIN — VENLAFAXINE HYDROCHLORIDE 150 MG: 75 CAPSULE, EXTENDED RELEASE ORAL at 08:47

## 2019-07-13 RX ADMIN — METOCLOPRAMIDE HYDROCHLORIDE 10 MG: 10 TABLET ORAL at 08:47

## 2019-07-13 RX ADMIN — ASPIRIN 81 MG 81 MG: 81 TABLET ORAL at 08:47

## 2019-07-13 RX ADMIN — GABAPENTIN 400 MG: 400 CAPSULE ORAL at 08:47

## 2019-07-13 RX ADMIN — METOCLOPRAMIDE HYDROCHLORIDE 10 MG: 10 TABLET ORAL at 12:45

## 2019-07-13 RX ADMIN — HYDROCODONE BITARTRATE AND ACETAMINOPHEN 1 TABLET: 7.5; 325 TABLET ORAL at 15:47

## 2019-07-13 RX ADMIN — IOPAMIDOL 90 ML: 755 INJECTION, SOLUTION INTRAVENOUS at 11:30

## 2019-07-13 RX ADMIN — HYDROCODONE BITARTRATE AND ACETAMINOPHEN 1 TABLET: 7.5; 325 TABLET ORAL at 09:24

## 2019-07-13 RX ADMIN — ARIPIPRAZOLE 15 MG: 15 TABLET ORAL at 08:47

## 2019-07-13 RX ADMIN — ONDANSETRON 4 MG: 2 INJECTION INTRAMUSCULAR; INTRAVENOUS at 06:24

## 2019-07-13 RX ADMIN — INSULIN ASPART 4 UNITS: 100 INJECTION, SOLUTION INTRAVENOUS; SUBCUTANEOUS at 18:06

## 2019-07-13 RX ADMIN — INSULIN ASPART 4 UNITS: 100 INJECTION, SOLUTION INTRAVENOUS; SUBCUTANEOUS at 09:24

## 2019-07-13 RX ADMIN — LORAZEPAM 0.5 MG: 0.5 TABLET ORAL at 12:45

## 2019-07-13 RX ADMIN — METOPROLOL TARTRATE 2.5 MG: 5 INJECTION INTRAVENOUS at 08:48

## 2019-07-13 RX ADMIN — METOCLOPRAMIDE HYDROCHLORIDE 10 MG: 10 TABLET ORAL at 17:01

## 2019-07-13 RX ADMIN — CLOPIDOGREL BISULFATE 75 MG: 75 TABLET ORAL at 08:47

## 2019-07-13 RX ADMIN — ONDANSETRON 4 MG: 2 INJECTION INTRAMUSCULAR; INTRAVENOUS at 12:45

## 2019-07-13 NOTE — PLAN OF CARE
Problem: Patient Care Overview  Goal: Plan of Care Review  Outcome: Ongoing (interventions implemented as appropriate)  Mild Sodium Restricted Diet info and Making Lifestyle Changes for a Healthier Weight used to provide Wt Loss Low Sodium Diet ed and diet copies   07/13/19 4691   Coping/Psychosocial   Plan of Care Reviewed With patient   Plan of Care Review   Progress no change   OTHER   Outcome Summary initial visit    given.

## 2019-07-13 NOTE — PROGRESS NOTES
LOS: 0 days   Patient Care Team:  Francisca Fong MD as PCP - Zulay Swanson MA as Medical Assistant    Chief Complaint: Admitted with chest pain with history of CAD and CABG and waiting for CTA       Review of Systems:   ROS  Constitutional: Positive for fatigue. Negative for chills, diaphoresis and fever.   Respiratory: Negative for cough, chest tightness, shortness of breath and wheezing.    Cardiovascular: Positive for chest pain. Negative for palpitations and leg swelling.   Gastrointestinal: Positive for nausea. Negative for abdominal pain, constipation, diarrhea and vomiting.   Genitourinary: Negative for difficulty urinating and dysuria.   Neurological: Positive for dizziness      Objective     Current Facility-Administered Medications   Medication Dose Route Frequency Provider Last Rate Last Dose   • acetaminophen (TYLENOL) tablet 650 mg  650 mg Oral Q4H PRN Darien Guzman MD       • ARIPiprazole (ABILIFY) tablet 15 mg  15 mg Oral Daily Darien Guzman MD       • aspirin chewable tablet 81 mg  81 mg Oral Daily Darien Guzman MD       • atorvastatin (LIPITOR) tablet 40 mg  40 mg Oral Nightly Darien Guzman MD   40 mg at 07/12/19 2107   • clopidogrel (PLAVIX) tablet 75 mg  75 mg Oral Daily Darien Guzman MD       • dextrose (D50W) 25 g/ 50mL Intravenous Solution 25 g  25 g Intravenous Q15 Min PRN Darien Guzman MD       • dextrose (GLUTOSE) oral gel 15 g  15 g Oral Q15 Min PRN Darien Guzman MD       • diphenhydrAMINE (BENADRYL) injection 25 mg  25 mg Intravenous Once Justus Jim MD       • gabapentin (NEURONTIN) capsule 400 mg  400 mg Oral TID Darien Guzman MD   400 mg at 07/12/19 2111   • glucagon (human recombinant) (GLUCAGEN DIAGNOSTIC) injection 1 mg  1 mg Subcutaneous PRN Darien Guzman MD       • HYDROcodone-acetaminophen (NORCO) 7.5-325 MG per tablet 1 tablet  1 tablet Oral Q4H PRN Darien Guzman MD   1 tablet at 07/12/19 1523   • insulin aspart (novoLOG) injection 0-24  Units  0-24 Units Subcutaneous 4x Daily AC & at Bedtime Darien Guzman MD   4 Units at 07/12/19 2107   • insulin detemir (LEVEMIR) injection 15 Units  15 Units Subcutaneous Nightly Darien Guzman MD   15 Units at 07/12/19 2107   • LORazepam (ATIVAN) tablet 0.5 mg  0.5 mg Oral Daily PRN Darien Guzman MD       • meclizine (ANTIVERT) tablet 25 mg  25 mg Oral TID PRN Darien Guzman MD       • metoclopramide (REGLAN) tablet 10 mg  10 mg Oral 4x Daily AC & at Bedtime Darien Guzman MD   10 mg at 07/12/19 2107   • metoprolol succinate XL (TOPROL-XL) 24 hr tablet 25 mg  25 mg Oral Daily Darien Guzman MD       • metoprolol tartrate (LOPRESSOR) injection 2.5 mg  2.5 mg Intravenous Once Justus Jim MD       • mirtazapine (REMERON) tablet 45 mg  45 mg Oral Nightly Darien Guzman MD   45 mg at 07/12/19 2107   • nitroglycerin (NITROSTAT) ointment 1 inch  1 inch Topical Q6H PRN Darien Guzman MD   1 inch at 07/12/19 1520   • ondansetron (ZOFRAN) injection 4 mg  4 mg Intravenous Q6H PRN Darien Guzman MD   4 mg at 07/13/19 0624   • pantoprazole (PROTONIX) EC tablet 40 mg  40 mg Oral QAM Darien Guzman MD   40 mg at 07/13/19 0618   • sodium chloride 0.9 % flush 10 mL  10 mL Intravenous PRN Ben Potrer PA-C       • sodium chloride 0.9 % flush 3 mL  3 mL Intravenous Q12H Darien Guzman MD   3 mL at 07/12/19 2108   • sodium chloride 0.9 % flush 3-10 mL  3-10 mL Intravenous PRN Darien Guzman MD       • sodium chloride 0.9 % infusion  75 mL/hr Intravenous Continuous Darien Guzman MD 75 mL/hr at 07/13/19 0522 75 mL/hr at 07/13/19 0522   • venlafaxine XR (EFFEXOR-XR) 24 hr capsule 150 mg  150 mg Oral BID Darien Guzman MD   150 mg at 07/12/19 2107       Vital Sign Min/Max for last 24 hours  Temp  Min: 96.3 °F (35.7 °C)  Max: 98.1 °F (36.7 °C)   BP  Min: 102/54  Max: 126/62   Pulse  Min: 63  Max: 71   Resp  Min: 16  Max: 18   SpO2  Min: 90 %  Max: 100 %   No Data Recorded   Weight  Min: 119 kg (262 lb)  Max: 121 kg (267  "lb)     Flowsheet Rows      First Filed Value   Admission Height  172.7 cm (68\") Documented at 07/12/2019 0907   Admission Weight  121 kg (267 lb) Documented at 07/12/2019 0907            Intake/Output Summary (Last 24 hours) at 7/13/2019 0641  Last data filed at 7/12/2019 1355  Gross per 24 hour   Intake 240 ml   Output --   Net 240 ml       Physical Exam:     General Appearance:    Alert, cooperative, in no acute distress   Lungs:     Clear to auscultation,respirations regular, even and                  unlabored    Heart:    Regular rhythm and normal rate, normal S1 and S2, no            murmur, no gallop, no rub, no click   Chest Wall:    No abnormalities observed   Abdomen:     Normal bowel sounds, no masses, no organomegaly, soft        non-tender, non-distended, no guarding, no rebound                tenderness   Extremities:   Moves all extremities well, no edema, no cyanosis, no             redness   Pulses:   Pulses palpable and equal bilaterally   Skin:   No bleeding, bruising or rash        Results Review:   I reviewed the patient's new clinical results.  Lab Results   Component Value Date    WBC 8.25 07/13/2019    HGB 12.6 07/13/2019    HCT 38.8 07/13/2019    MCV 85.5 07/13/2019     07/13/2019     Lab Results   Component Value Date    GLUCOSE 181 (H) 07/13/2019    BUN 13 07/13/2019    CREATININE 0.97 07/13/2019    EGFRIFNONA 59 (L) 07/13/2019    BCR 13.4 07/13/2019    CO2 27.0 07/13/2019    CALCIUM 8.9 07/13/2019    ALBUMIN 3.40 (L) 07/13/2019    AST 14 07/13/2019    ALT 16 07/13/2019     Lab Results   Component Value Date    TSH 8.990 (H) 07/12/2019     Lab Results   Component Value Date    INR 1.02 07/21/2018    INR 0.99 04/30/2018    INR 0.95 10/14/2017    PROTIME 13.2 07/21/2018    PROTIME 12.9 04/30/2018    PROTIME 12.6 10/14/2017     Lab Results   Component Value Date    PTT 24.2 08/27/2017       EKG:     Telemetry:    Ejection Fraction  Lab Results   Component Value Date    EF 70 " 05/02/2018       Echo EF Estimated  Lab Results   Component Value Date    ECHOEFEST 62 07/12/2019         Present on Admission:  • Chest pain    Assessment/Plan   #1 chest pain #2 history of CAD status post CABG with normal stress test 2018 #3 hypertension with hypertensive heart disease #4 congestive heart failure in the base of diastolic dysfunction compensated #5 history of pulmonary hypertension #6 metabolic syndrome with history of hypertension, hyperlipidemia, diabetes     59 years old patient with a background history of CAD status post CABG with a normal previous stress test, hypertension, hypertensive heart disease, metabolic syndrome, pulmonary hypertension admitted evaluation chest pain appropriate medical management.  EKG and cardiac enzymes are negative.  And she is resting comfortably in bed.  Given the previous normal stress test, symptom complex, multiple risk factors seemed appropriate to further risk stratify with a CT coronary angiogram.  Procedure risk discussed with the patient pros and cons explained.  Understand willing to proceed forward.  Risk factor lifestyle modification discussed with the patient.  Lipitor for management of hyperlipidemia aspirin Plavix with history of CAD and CABG insulin sliding scale for management of diabetes and hypertension's been treated with metoprolol XL.  Further recommendation based on patient clinical condition hospital course and outcome of CT coronary angiogram.        Justus Jim MD  07/13/19  6:41 AM      Part of this note may be an electronic transcription/translation of spoken language to printed text using the Dragon Dictation system.

## 2019-07-13 NOTE — DISCHARGE SUMMARY
[facility] Medicine [facility]  DISCHARGE SUMMARY       Date of Admission: 7/12/2019  Date of Discharge:  7/13/2019  Primary Care Physician: Francisca Fong MD    Presenting Problem/History of Present Illness:  Chest pain, unspecified type [R07.9]   This 59-year-old  female with a known history of coronary artery disease (status post CABG in 2005), that is post PCI, hypertension, depressive disorder, diabetes mellitus, GERD, obesity presented to the emergency department with sudden onset chest pain.  Patient reports that the chest pain is been present for approximately 3 hours and it is substernal radiating into her neck and shoulder.  Stated that the pain medication helped it.  Also reports that aspirin seemed to help some, but no other aggravating or relieving factors.  Denies increasing pain on exertion, diaphoresis, orthopnea, dyspnea upon exertion, shortness of air, fever or chills.  States that she did feel lightheaded and presyncopal.  No focal weakness, no slurring of speech, drooping of face or recent illness.       Final Discharge Diagnoses:  Active Hospital Problems    Diagnosis   • Chest pain       Consults:   Consults     Date and Time Order Name Status Description    7/12/2019 1139 Inpatient Cardiology Consult Completed     7/12/2019 1109 Hospitalist (on-call MD unless specified)            Procedures Performed: None.                Pertinent Test Results:   Lab Results (last 72 hours)     Procedure Component Value Units Date/Time    POC Glucose Once [[id number]]  (Abnormal) Collected:  07/13/19 1106    Specimen:  Blood Updated:  07/13/19 1118     Glucose 142 mg/dL      Comment: RN NotifiedOperator: [id number] FIELDS ERICAMeter ID: [id number]       Comprehensive Metabolic Panel [[id number]]  (Abnormal) Collected:  07/13/19 0605    Specimen:  Blood Updated:  07/13/19 0635     Glucose 181 mg/dL      BUN 13 mg/dL      Creatinine 0.97 mg/dL      Sodium  142 mmol/L      Potassium 3.9 mmol/L      Chloride 103 mmol/L      CO2 27.0 mmol/L      Calcium 8.9 mg/dL      Total Protein 6.6 g/dL      Albumin 3.40 g/dL      ALT (SGPT) 16 U/L      AST (SGOT) 14 U/L      Alkaline Phosphatase 125 U/L      Total Bilirubin 0.2 mg/dL      eGFR Non African Amer 59 mL/min/1.73      Globulin 3.2 gm/dL      A/G Ratio 1.1 g/dL      BUN/Creatinine Ratio 13.4     Anion Gap 12.0 mmol/L     Narrative:       GFR Normal >60  Chronic Kidney Disease <60  Kidney Failure <15    POC Glucose Once [[id number]]  (Abnormal) Collected:  07/13/19 0551    Specimen:  Blood Updated:  07/13/19 0624     Glucose 149 mg/dL      Comment: RN NotifiedOperator: [id number] CARLIE BOURGEOISNIFERMeter ID: [id number]       CBC Auto Differential [[id number]]  (Normal) Collected:  07/13/19 0605    Specimen:  Blood Updated:  07/13/19 0616     WBC 8.25 10*3/mm3      RBC 4.54 10*6/mm3      Hemoglobin 12.6 g/dL      Hematocrit 38.8 %      MCV 85.5 fL      MCH 27.8 pg      MCHC 32.5 g/dL      RDW 14.1 %      RDW-SD 43.5 fl      MPV 9.6 fL      Platelets 370 10*3/mm3      Neutrophil % 58.2 %      Lymphocyte % 29.5 %      Monocyte % 6.8 %      Eosinophil % 4.5 %      Basophil % 0.6 %      Immature Grans % 0.4 %      Neutrophils, Absolute 4.81 10*3/mm3      Lymphocytes, Absolute 2.43 10*3/mm3      Monocytes, Absolute 0.56 10*3/mm3      Eosinophils, Absolute 0.37 10*3/mm3      Basophils, Absolute 0.05 10*3/mm3      Immature Grans, Absolute 0.03 10*3/mm3      nRBC 0.0 /100 WBC     Troponin [[id number]]  (Normal) Collected:  07/13/19 0515    Specimen:  Blood Updated:  07/13/19 0616     Troponin T <0.010 ng/mL     Narrative:       Troponin T Reference Range:  <= 0.03 ng/mL-   Negative for AMI  >0.03 ng/mL-     Abnormal for myocardial necrosis.  Clinicians would have to utilize clinical acumen, EKG, Troponin and serial changes to determine if it is an Acute Myocardial Infarction or myocardial injury due to an underlying chronic condition.      Lipid Panel [[id number]]  (Abnormal) Collected:  07/13/19 0515    Specimen:  Blood Updated:  07/13/19 0554     Total Cholesterol 172 mg/dL      Triglycerides 214 mg/dL      HDL Cholesterol 37 mg/dL      LDL Cholesterol  92 mg/dL      VLDL Cholesterol 42.8 mg/dL      LDL/HDL Ratio 2.49    Narrative:       Cholesterol Reference Ranges  (U.S. Department of Health and Human Services ATP III Classifications)    Desirable          <200 mg/dL  Borderline High    200-239 mg/dL  High Risk          >240 mg/dL      Triglyceride Reference Ranges  (U.S. Department of Health and Human Services ATP III Classifications)    Normal           <150 mg/dL  Borderline High  150-199 mg/dL  High             200-499 mg/dL  Very High        >500 mg/dL    HDL Reference Ranges  (U.S. Department of Health and Human Services ATP III Classifcations)    Low     <40 mg/dl (major risk factor for CHD)  High    >60 mg/dl ('negative' risk factor for CHD)        LDL Reference Ranges  (U.S. Department of Health and Human Services ATP III Classifcations)    Optimal          <100 mg/dL  Near Optimal     100-129 mg/dL  Borderline High  130-159 mg/dL  High             160-189 mg/dL  Very High        >189 mg/dL    POC Glucose Once [[id number]]  (Abnormal) Collected:  07/12/19 2002    Specimen:  Blood Updated:  07/12/19 2058     Glucose 178 mg/dL      Comment: RN NotifiedOperator: [id number] CARLIE BOURGEOISNIFERMeter ID: [id number]       POC Glucose Once [[id number]]  (Normal) Collected:  07/12/19 1553    Specimen:  Blood Updated:  07/12/19 1618     Glucose 109 mg/dL      Comment: : [id number] URBAN LEXIEMeter ID: [id number]       Troponin [[id number]]  (Normal) Collected:  07/12/19 1331    Specimen:  Blood Updated:  07/12/19 1355     Troponin T <0.010 ng/mL     Narrative:       Troponin T Reference Range:  <= 0.03 ng/mL-   Negative for AMI  >0.03 ng/mL-     Abnormal for myocardial necrosis.  Clinicians would have to utilize clinical acumen, EKG,  Troponin and serial changes to determine if it is an Acute Myocardial Infarction or myocardial injury due to an underlying chronic condition.     POC Glucose Once [[id number]]  (Normal) Collected:  07/12/19 1338    Specimen:  Blood Updated:  07/12/19 1351     Glucose 86 mg/dL      Comment: : [id number] URBAN LEXIEMeter ID: [id number]       TSH [[id number]]  (Abnormal) Collected:  07/12/19 0927    Specimen:  Blood Updated:  07/12/19 1317     TSH 8.990 mIU/mL     Hemoglobin A1c [[id number]]  (Abnormal) Collected:  07/12/19 0927    Specimen:  Blood Updated:  07/12/19 1312     Hemoglobin A1C 8.80 %     Narrative:       Hemoglobin A1C Ranges:    Increased Risk for Diabetes  5.7% to 6.4%  Diabetes                     >= 6.5%  Diabetic Goal                < 7.0%    Lipase [[id number]]  (Normal) Collected:  07/12/19 0927    Specimen:  Blood Updated:  07/12/19 1305     Lipase 22 U/L     [place] Draw [[id number]] Collected:  07/12/19 0927    Specimen:  Blood Updated:  07/12/19 1030    Narrative:       The following orders were created for panel order [place] Draw.  Procedure                               Abnormality         Status                     ---------                               -----------         ------                     Light Blue Top[[id number]]                                   Final result               Green Top (Gel)[[id number]]                                  Final result               Lavender Top[[id number]]                                     Final result               Gold Top - SST[[id number]]                                   Final result                 Please view results for these tests on the individual orders.    Light Blue Top [[id number]] Collected:  07/12/19 0927    Specimen:  Blood Updated:  07/12/19 1030     Extra Tube hold for add-on     Comment: Auto resulted       Green Top (Gel) [[id number]] Collected:  07/12/19 0927    Specimen:  Blood Updated:  07/12/19 1030     Extra Tube Hold for  add-ons.     Comment: Auto resulted.       Gold Top - SST [[id number]] Collected:  07/12/19 0927    Specimen:  Blood Updated:  07/12/19 1030     Extra Tube Hold for add-ons.     Comment: Auto resulted.       Lavender Top [[id number]] Collected:  07/12/19 0927    Specimen:  Blood Updated:  07/12/19 1030     Extra Tube hold for add-on     Comment: Auto resulted       Comprehensive Metabolic Panel [[id number]]  (Abnormal) Collected:  07/12/19 0927    Specimen:  Blood Updated:  07/12/19 1025     Glucose 190 mg/dL      BUN 16 mg/dL      Creatinine 1.11 mg/dL      Sodium 139 mmol/L      Potassium 3.5 mmol/L      Chloride 99 mmol/L      CO2 25.0 mmol/L      Calcium 9.6 mg/dL      Total Protein 7.0 g/dL      Albumin 4.20 g/dL      ALT (SGPT) 17 U/L      AST (SGOT) 16 U/L      Alkaline Phosphatase 147 U/L      Total Bilirubin <0.2 mg/dL      eGFR Non African Amer 51 mL/min/1.73      Globulin 2.8 gm/dL      A/G Ratio 1.5 g/dL      BUN/Creatinine Ratio 14.4     Anion Gap 15.0 mmol/L     Narrative:       GFR Normal >60  Chronic Kidney Disease <60  Kidney Failure <15    Troponin [[id number]]  (Normal) Collected:  07/12/19 0927    Specimen:  Blood Updated:  07/12/19 1022     Troponin T <0.010 ng/mL     Narrative:       Troponin T Reference Range:  <= 0.03 ng/mL-   Negative for AMI  >0.03 ng/mL-     Abnormal for myocardial necrosis.  Clinicians would have to utilize clinical acumen, EKG, Troponin and serial changes to determine if it is an Acute Myocardial Infarction or myocardial injury due to an underlying chronic condition.     BNP [[id number]]  (Normal) Collected:  07/12/19 0927    Specimen:  Blood Updated:  07/12/19 1021     proBNP 82.3 pg/mL     Narrative:       Among patients with dyspnea, NT-proBNP is highly sensitive for the detection of acute congestive heart failure. In addition NT-proBNP of <300 pg/ml effectively rules out acute congestive heart failure with 99% negative predictive value.    CBC & Differential [[id number]]  Collected:  07/12/19 0927    Specimen:  Blood Updated:  07/12/19 0942    Narrative:       The following orders were created for panel order CBC & Differential.  Procedure                               Abnormality         Status                     ---------                               -----------         ------                     CBC Auto Differential[[id number]]        Abnormal            Final result                 Please view results for these tests on the individual orders.    CBC Auto Differential [[id number]]  (Abnormal) Collected:  07/12/19 0927    Specimen:  Blood Updated:  07/12/19 0942     WBC 12.72 10*3/mm3      RBC 4.51 10*6/mm3      Hemoglobin 12.5 g/dL      Hematocrit 38.1 %      MCV 84.5 fL      MCH 27.7 pg      MCHC 32.8 g/dL      RDW 14.1 %      RDW-SD 43.0 fl      MPV 9.7 fL      Platelets 396 10*3/mm3      Neutrophil % 63.9 %      Lymphocyte % 26.2 %      Monocyte % 5.5 %      Eosinophil % 3.6 %      Basophil % 0.4 %      Immature Grans % 0.4 %      Neutrophils, Absolute 8.13 10*3/mm3      Lymphocytes, Absolute 3.33 10*3/mm3      Monocytes, Absolute 0.70 10*3/mm3      Eosinophils, Absolute 0.46 10*3/mm3      Basophils, Absolute 0.05 10*3/mm3      Immature Grans, Absolute 0.05 10*3/mm3      nRBC 0.0 /100 WBC         Imaging Results (last 72 hours)     Procedure Component Value Units Date/Time    CT Angiogram Coronary [[id number]] Collected:  07/13/19 1036     Updated:  07/13/19 1338    Narrative:       PROCEDURE: CT HEART CORONARY ANGIOGRAM WITH IV CONTRAST    CLINICAL HISTORY: chest pain, R07.9 Chest pain, unspecified    COMPARISON: None.    TECHNIQUE:  Images were obtained after premedication with 2.5 mg IV  metoprolol  Serial axial CT images were obtained through the heart at 3 mm  thickness without contrast for calcium scoring.   Subsequently, following the intravenous administration of ml of  Isovue-370, serial axial CT images were obtained through the  heart at 0.6 mm thickness utilizing  retrospective  gating.   Post processing was performed by the radiologist at the [employer]  workstation.   3D images including vessel probing technique were also obtained.   Full field of view reconstructed images were used for evaluation  of the extracardiac tissues.    This exam was performed using radiation doses that are as low as  reasonably achievable (ALARA).  This exam was performed according to our departmental dose  optimization program, which includes automated exposure control,  adjustment of the mA and/or KV according to patient size and/or  use of iterative reconstruction technique.    FINDINGS:  CALCIUM PLAQUE BURDEN:    REGION                                         CALCIUM SCORE  ([place])  Left Main                                                      0   Left Anterior Descending                           56   Circumflex                                                   0   Right Coronary Artery                                 50     Total calcium score is 106    Implication: Definite, at least moderate atherosclerotic plaque  Risk of coronary artery disease: Mild coronary artery disease  highly likely, significant narrowings possible    CT FUNCTIONAL ANALYSIS:  Ejection Fraction     70 %  Diastolic Volume     112 ml  Systolic Volume      33 ml  Stroke Volume        78 ml  Cardiac Output        4.4 L/minute    CTA OF THE CORONARY ARTERIES:   There is a type C LAD.   Coronary anatomy is right dominant    Left Main: No calcified or soft itssue density plaque and no  stenosis.  LAD: Native vessel is completely occluded. There is also an  occluded stent in the distal LAD. There is a LIMA graft to the  distal LAD which appears grossly patent however evaluation is  very limited due to small vessel size and some motion artifact.  Circumflex: Patent. No calcified or soft tissue density plaque  and no stenosis.  RCA: Patent. Moderate narrowing in the proximal to mid aspect due  to noncalcified plaque,  approximately 50-70% narrowed.     ADDITIONAL FINDINGS:  The aortic valve is tricuspid.   There is no myocardial bridging.   On short axis views, the myocardium is homogeneous in thickness.      Impression:       CONCLUSION:   Moderate narrowing of the proximal to mid RCA due to noncalcified  plaque, approximately 50-70% narrowed.  Native LAD is completely occluded. There is also an occluded  stent in the distal native LAD.   There is a LIMA graft to the distal LAD which appears grossly  patent however evaluation is very limited due to small vessel  size and motion artifact.  Normal ejection fraction of 70%.    Electronically signed by:  Vamshi Rose MD  7/13/2019 1:37 PM CDT  Workstation: [id number]    XR Chest 1 View [[id number]] Collected:  07/12/19 0930     Updated:  07/12/19 0955    Narrative:       PROCEDURE: Portable chest x-ray    TECHNIQUE: Single AP view of the chest    COMPARISON: 7/21/2018    HISTORY: Chest pain protocol  chest pain protocol    FINDINGS:     Life-support devices: None    Lungs/pleura: Elevated right hemidiaphragm. No overt pulmonary  vascular congestion, focal pulmonary parenchymal opacity, pleural  effusion or pneumothorax.    Heart, hilar and mediastinal structures: Sternal suture wires  appear unchanged. Cardiac silhouette is normal in size.    Surgical clips again noted in the right upper quadrant of the  abdomen.      Impression:       CONCLUSION:  No acute pulmonary disease.    Electronically signed by:  Vamshi Rose MD  7/12/2019 9:54 AM CDT  Workstation: [id number]            Chief Complaint on Day of Discharge: None.    Hospital Course:  The patient was admitted for chest pain to rule out acute coronary syndrome.  She was started on guideline directed medical therapy.  Serial troponins were negative and EKG did not show any acute changes.  Echocardiogram showed:  · Left ventricular wall thickness is consistent with mild concentric hypertrophy.  · Estimated EF = 62%.  · Left  "ventricular systolic function is normal.  · Left ventricular diastolic dysfunction (grade I a) consistent with impaired relaxation.  · Left atrial cavity size is moderately dilated.  · There is calcification of the aortic valve mainly affecting the non coronary cusp(s).  · Tricuspid valve is grossly normal. Trace to mild tricuspid valve regurgitation is present. Estimated right ventricular systolic pressure from tricuspid regurgitation is mildly elevated (35-45 mmHg). Mild pulmonary hypertension is present.  She was seen by the cardiologist and recommended CT coronary angiogram which showed:  Moderate narrowing of the proximal to mid RCA due to noncalcified  plaque, approximately 50-70% narrowed.  Native LAD is completely occluded. There is also an occluded  stent in the distal native LAD.   There is a LIMA graft to the distal LAD which appears grossly  patent however evaluation is very limited due to small vessel  size and motion artifact.  Normal ejection fraction of 70%.     Patient remains chest pain-free and was recommended medical management by Dr. Jim.  Ranexa and Imdur where prescribed by the cardiologist.  Patient will be scheduled for follow-up by Dr. Kwon as outpatient.    She was continued on her routine outpatient medications for diabetes mellitus, hypertension and depressive disorder.    Condition on Discharge: Stable and improved.    Physical Exam on Discharge:  /59 (BP Location: Left arm)   Pulse 65   Temp 96.8 °F (36 °C) (Axillary)   Resp 17   Ht 172.7 cm (68\")   Wt 119 kg (262 lb)   SpO2 98%   BMI 39.84 kg/m²   Physical Exam   Constitutional: She is oriented to person, place, and time. She appears well-developed and well-nourished. She is cooperative. No distress.   Patient is obese and has a BMI of 39.84.   HENT:   Head: Normocephalic and atraumatic.   Right Ear: External ear normal.   Left Ear: External ear normal.   Nose: Nose normal.   Mouth/Throat: Oropharynx is clear and " moist.   Eyes: Conjunctivae and EOM are normal. Pupils are equal, round, and reactive to light.   Neck: Normal range of motion. Neck supple. No JVD present. No thyromegaly present.   Cardiovascular: Normal rate, regular rhythm, normal heart sounds and intact distal pulses. Exam reveals no gallop and no friction rub.   No murmur heard.  Pulmonary/Chest: Effort normal and breath sounds normal. No stridor. No respiratory distress. She has no wheezes. She has no rales. She exhibits no tenderness.   Abdominal: Soft. Bowel sounds are normal. She exhibits no distension and no mass. There is no tenderness. There is no rebound and no guarding. No hernia.   Musculoskeletal: Normal range of motion. She exhibits no edema, tenderness or deformity.   Neurological: She is alert and oriented to person, place, and time. She has normal reflexes. No cranial nerve deficit or sensory deficit. She exhibits normal muscle tone. Coordination normal.   Skin: Skin is warm and dry. No rash noted. She is not diaphoretic. No erythema. No pallor.   Psychiatric: She has a normal mood and affect. Her behavior is normal. Judgment and thought content normal.   Nursing note and vitals reviewed.        Discharge Disposition:  Home or Self Care    Discharge Medications:     Discharge Medications      New Medications      Instructions Start Date   isosorbide mononitrate 30 MG 24 hr tablet  Commonly known as:  IMDUR   30 mg, Oral, Every 24 Hours Scheduled   Start Date:  7/14/2019     ranolazine 500 MG 12 hr tablet  Commonly known as:  RANEXA   500 mg, Oral, Every 12 Hours Scheduled         Changes to Medications      Instructions Start Date   gabapentin 400 MG capsule  Commonly known as:  NEURONTIN  What changed:  Another medication with the same name was removed. Continue taking this medication, and follow the directions you see here.   TAKE 1 CAPSULE BY MOUTH THREE TIMES A DAY      vitamin D 05303 units capsule capsule  Commonly known as:   ERGOCALCIFEROL  What changed:  Another medication with the same name was removed. Continue taking this medication, and follow the directions you see here.   50,000 Units, Oral, Weekly, sunday          Continue These Medications      Instructions Start Date   ARIPiprazole 15 MG tablet  Commonly known as:  ABILIFY   15 mg, Oral, Daily      aspirin 81 MG chewable tablet   81 mg, Oral, Daily      atorvastatin 40 MG tablet  Commonly known as:  LIPITOR   40 mg, Oral, Nightly      B-D ULTRAFINE III SHORT PEN 31G X 8 MM misc  Generic drug:  Insulin Pen Needle   INJECT 1 EACH INTO THE SHOULDER, THIGH, OR BUTTOCKS 4 (FOUR) TIMES A DAY. USE AS DIRECTED      BD SHARPS CONTAINER HOME misc   1 each, Does not apply, Take As Directed      CITRACAL/VITAMIN D 250-200 MG-UNIT tablet  Generic drug:  Calcium Citrate-Vitamin D   2 tablets, Oral, 2 Times Daily      clopidogrel 75 MG tablet  Commonly known as:  PLAVIX   75 mg, Oral, Daily      cyanocobalamin 1000 MCG/ML injection   1,000 mcg, Intramuscular, Every 28 Days      furosemide 40 MG tablet  Commonly known as:  LASIX   40 mg, Oral, Daily      GLUCAGON EMERGENCY 1 MG injection  Generic drug:  glucagon   USE AS DIRECTED AS NEEDED      glucose blood test strip  Commonly known as:  ONE TOUCH ULTRA TEST   1 each, Other, 4 Times Daily, Use as instructed      ONE TOUCH ULTRA TEST test strip  Generic drug:  glucose blood   USE TO TEST SUGAR 4 TIMES A DAY      hydrochlorothiazide 12.5 MG capsule  Commonly known as:  MICROZIDE   TAKE 1 CAPSULE BY MOUTH DAILY.      HYDROcodone-acetaminophen 7.5-325 MG per tablet  Commonly known as:  NORCO   1 tablet, Oral, Every 4 Hours PRN      insulin aspart 100 UNIT/ML solution pen-injector sc pen  Commonly known as:  novoLOG FLEXPEN   60 Units, Subcutaneous, 3 Times Daily With Meals      insulin aspart 100 UNIT/ML solution pen-injector sc pen  Commonly known as:  NOVOLOG FLEXPEN   INJECT 60 UNITS BEFORE MEALS 3 TIMES A DAY      Insulin Glargine 100  "UNIT/ML injection pen  Commonly known as:  BASAGLAR KWIKPEN   100 Units, Subcutaneous, Nightly      LORazepam 0.5 MG tablet  Commonly known as:  ATIVAN   0.5 mg, Oral, Daily PRN, Fill when due      meclizine 25 MG tablet  Commonly known as:  ANTIVERT   25 mg, Oral, 3 Times Daily PRN      meloxicam 15 MG tablet  Commonly known as:  MOBIC   15 mg, Oral, Daily, Take once daily.      metoclopramide 10 MG tablet  Commonly known as:  REGLAN   10 mg, Oral, 4 Times Daily PRN      metoprolol succinate XL 25 MG 24 hr tablet  Commonly known as:  TOPROL-XL   25 mg, Oral, Daily      mirtazapine 45 MG tablet  Commonly known as:  REMERON   45 mg, Oral, Nightly      omeprazole 40 MG capsule  Commonly known as:  PRILOSEC   40 mg, Oral, Daily      ONE TOUCH ULTRA MINI w/Device kit   USE AS DIRECTED TO CHECK BLOOD SUGAR      phenazopyridine 200 MG tablet  Commonly known as:  PYRIDIUM   200 mg, Oral, 3 Times Daily PRN      Syringe/Needle (Disp) 25G X 5/8\" 3 ML misc  Commonly known as:  LUER LOCK SAFETY SYRINGES   1 each, Does not apply, Daily      venlafaxine  MG 24 hr capsule  Commonly known as:  EFFEXOR-XR   150 mg, Oral, 2 Times Daily         Stop These Medications    SANTYL 250 UNIT/GM ointment  Generic drug:  collagenase            Discharge Diet: Cardiac and diabetic.    Activity at Discharge: As tolerated.    Discharge Care Plan/Instructions: Patient has been advised to take her medications as prescribed and to return to the emergency room in the event of any worsening symptoms.    Follow-up Appointments:   Future Appointments   Date Time Provider Department Center   7/15/2019  8:00 AM Barry Gamboa PTA  [facility]   7/17/2019  8:00 AM Barry Gamboa PTA  [facility]   7/22/2019  8:00 AM Barry Gabmoa PTA [facility]   7/23/2019  8:00 AM Lucero Camilo, PT DPT [facility]   7/23/2019 11:00 AM Ignacio Lord DPM [facility] POD MAD None   8/21/2019  1:15 PM Can Kwon MD PhD [facility] CD MAD " None   8/29/2019  9:30 AM Kimberly Ferguson APRN [facility] PC POW None   9/5/2019  8:30 AM Elvis Gambao APRN [facility] END MAD None       Test Results Pending at Discharge:     Mat Peng MD  07/13/19  5:23 PM    Time: 35 minutes.

## 2019-07-13 NOTE — PLAN OF CARE
Problem: Pain, Chronic (Adult)  Goal: Identify Related Risk Factors and Signs and Symptoms  Outcome: Ongoing (interventions implemented as appropriate)    Goal: Acceptable Pain/Comfort Level and Functional Ability  Outcome: Ongoing (interventions implemented as appropriate)      Problem: Fall Risk (Adult)  Goal: Identify Related Risk Factors and Signs and Symptoms  Outcome: Ongoing (interventions implemented as appropriate)    Goal: Absence of Fall  Outcome: Ongoing (interventions implemented as appropriate)      Problem: Patient Care Overview  Goal: Plan of Care Review  Outcome: Ongoing (interventions implemented as appropriate)   07/13/19 0529   Coping/Psychosocial   Plan of Care Reviewed With patient   Plan of Care Review   Progress no change       Problem: Cardiac: ACS (Acute Coronary Syndrome) (Adult)  Goal: Signs and Symptoms of Listed Potential Problems Will be Absent, Minimized or Managed (Cardiac: ACS)  Outcome: Ongoing (interventions implemented as appropriate)

## 2019-07-13 NOTE — PLAN OF CARE
Problem: Pain, Chronic (Adult)  Goal: Identify Related Risk Factors and Signs and Symptoms  Outcome: Outcome(s) achieved Date Met: 07/13/19 07/13/19 1534   Pain, Chronic (Adult)   Related Risk Factors (Chronic Pain) disease process;physical disability   Signs and Symptoms (Chronic Pain) verbalization of pain/discomfort for a prolonged time period     Goal: Acceptable Pain/Comfort Level and Functional Ability  Outcome: Ongoing (interventions implemented as appropriate)      Problem: Patient Care Overview  Goal: Plan of Care Review  Outcome: Ongoing (interventions implemented as appropriate)      Problem: Cardiac: ACS (Acute Coronary Syndrome) (Adult)  Goal: Signs and Symptoms of Listed Potential Problems Will be Absent, Minimized or Managed (Cardiac: ACS)  Outcome: Ongoing (interventions implemented as appropriate)

## 2019-07-13 NOTE — PROGRESS NOTES
[facility] Medicine Services  INPATIENT PROGRESS NOTE    Length of Stay: 0  Date of Admission: 7/12/2019  Primary Care Physician: Francisca Fong MD    Subjective   Chief Complaint: No new complaints.    HPI: Patient is seen for follow-up today.  She has history of coronary artery disease (status post CABG), hypertension, diabetes mellitus, depressive disorder and was admitted for chest pain.  She is doing well, remains chest pain-free and has been recommended CT coronary angiogram by the cardiologist.  She voices no new complaints.    Review of Systems   Constitutional: Positive for activity change and fatigue. Negative for appetite change, chills, diaphoresis and fever.   HENT: Negative for trouble swallowing and voice change.    Eyes: Negative for photophobia and visual disturbance.   Respiratory: Negative for cough, choking, chest tightness, shortness of breath, wheezing and stridor.    Cardiovascular: Negative for chest pain, palpitations and leg swelling.   Gastrointestinal: Negative for abdominal distention, abdominal pain, blood in stool, constipation, diarrhea, nausea and vomiting.   Endocrine: Negative for cold intolerance, heat intolerance, polydipsia, polyphagia and polyuria.   Genitourinary: Negative for decreased urine volume, difficulty urinating, dysuria, enuresis, flank pain, frequency, hematuria and urgency.   Musculoskeletal: Negative for arthralgias, gait problem, myalgias, neck pain and neck stiffness.   Skin: Negative for pallor, rash and wound.   Neurological: Negative for dizziness, tremors, seizures, syncope, facial asymmetry, speech difficulty, weakness, light-headedness, numbness and headaches.   Hematological: Does not bruise/bleed easily.   Psychiatric/Behavioral: Negative for agitation, behavioral problems and confusion.       Objective    Temp:  [96.3 °F (35.7 °C)-97.7 °F (36.5 °C)] 96.3 °F (35.7 °C)  Heart Rate:  [63-75] 75  Resp:   [16-18] 18  BP: (102-150)/(48-79) 150/69    Physical Exam   Constitutional: She is oriented to person, place, and time. She appears well-developed and well-nourished. She is cooperative. No distress.   Patient is obese and has a BMI of 39.84   HENT:   Head: Normocephalic and atraumatic.   Right Ear: External ear normal.   Left Ear: External ear normal.   Nose: Nose normal.   Mouth/Throat: Oropharynx is clear and moist.   Eyes: Conjunctivae and EOM are normal. Pupils are equal, round, and reactive to light.   Neck: Normal range of motion. Neck supple. No JVD present. No thyromegaly present.   Cardiovascular: Normal rate, regular rhythm, normal heart sounds and intact distal pulses. Exam reveals no gallop and no friction rub.   No murmur heard.  Pulmonary/Chest: Effort normal and breath sounds normal. No stridor. No respiratory distress. She has no wheezes. She has no rales. She exhibits no tenderness.   Abdominal: Soft. Bowel sounds are normal. She exhibits no distension and no mass. There is no tenderness. There is no rebound and no guarding. No hernia.   Musculoskeletal: Normal range of motion. She exhibits no edema, tenderness or deformity.   Neurological: She is alert and oriented to person, place, and time. She has normal reflexes. No cranial nerve deficit or sensory deficit. She exhibits normal muscle tone.   Skin: Skin is warm and dry. No rash noted. She is not diaphoretic. No erythema. No pallor.   Psychiatric: She has a normal mood and affect. Her behavior is normal. Judgment and thought content normal.   Nursing note and vitals reviewed.        Medication Review:    Current Facility-Administered Medications:   •  acetaminophen (TYLENOL) tablet 650 mg, 650 mg, Oral, Q4H PRN, Darien Guzman MD  •  ARIPiprazole (ABILIFY) tablet 15 mg, 15 mg, Oral, Daily, Darien Guzman MD, 15 mg at 07/13/19 0847  •  aspirin chewable tablet 81 mg, 81 mg, Oral, Daily, Darien Guzman MD, 81 mg at 07/13/19 0847  •  atorvastatin  (LIPITOR) tablet 40 mg, 40 mg, Oral, Nightly, Darien Guzman MD, 40 mg at 07/12/19 2107  •  clopidogrel (PLAVIX) tablet 75 mg, 75 mg, Oral, Daily, Darien Guzman MD, 75 mg at 07/13/19 0847  •  dextrose (D50W) 25 g/ 50mL Intravenous Solution 25 g, 25 g, Intravenous, Q15 Min PRN, Darien Guzman MD  •  dextrose (GLUTOSE) oral gel 15 g, 15 g, Oral, Q15 Min PRN, Darien Guzman MD  •  diphenhydrAMINE (BENADRYL) injection 25 mg, 25 mg, Intravenous, Once, Justus Jim MD  •  gabapentin (NEURONTIN) capsule 400 mg, 400 mg, Oral, TID, Darien Guzman MD, 400 mg at 07/13/19 0847  •  glucagon (human recombinant) (GLUCAGEN DIAGNOSTIC) injection 1 mg, 1 mg, Subcutaneous, PRN, Darien Guzman MD  •  HYDROcodone-acetaminophen (NORCO) 7.5-325 MG per tablet 1 tablet, 1 tablet, Oral, Q4H PRN, Darien Guzamn MD, 1 tablet at 07/13/19 0924  •  insulin aspart (novoLOG) injection 0-24 Units, 0-24 Units, Subcutaneous, 4x Daily AC & at Bedtime, Darien Guzman MD, 4 Units at 07/13/19 0924  •  insulin detemir (LEVEMIR) injection 15 Units, 15 Units, Subcutaneous, Nightly, Darien Guzman MD, 15 Units at 07/12/19 2107  •  [COMPLETED] iopamidol (ISOVUE-370) 76 % injection 100 mL, 100 mL, Intravenous, Once in imaging, Darien Guzman MD, 90 mL at 07/13/19 1130  •  LORazepam (ATIVAN) tablet 0.5 mg, 0.5 mg, Oral, Daily PRN, Darien Guzman MD  •  meclizine (ANTIVERT) tablet 25 mg, 25 mg, Oral, TID PRN, Darien Guzman MD  •  metoclopramide (REGLAN) tablet 10 mg, 10 mg, Oral, 4x Daily AC & at Bedtime, Darien Guzman MD, 10 mg at 07/13/19 0847  •  metoprolol succinate XL (TOPROL-XL) 24 hr tablet 25 mg, 25 mg, Oral, Daily, Darien Guzman MD, 25 mg at 07/13/19 0847  •  mirtazapine (REMERON) tablet 45 mg, 45 mg, Oral, Nightly, Darien Guzman MD, 45 mg at 07/12/19 2107  •  nitroglycerin (NITROSTAT) ointment 1 inch, 1 inch, Topical, Q6H PRN, Darien Guzman MD, 1 inch at 07/12/19 1520  •  ondansetron (ZOFRAN) injection 4 mg, 4 mg, Intravenous, Q6H PRN, Thomas  MD Darien, 4 mg at 07/13/19 0624  •  pantoprazole (PROTONIX) EC tablet 40 mg, 40 mg, Oral, QAM, Darien Guzman MD, 40 mg at 07/13/19 0618  •  sodium chloride 0.9 % flush 10 mL, 10 mL, Intravenous, PRN, Ben Porter PA-C  •  sodium chloride 0.9 % flush 3 mL, 3 mL, Intravenous, Q12H, Darien Guzman MD, 3 mL at 07/12/19 2108  •  sodium chloride 0.9 % flush 3-10 mL, 3-10 mL, Intravenous, PRN, Darien Guzman MD  •  sodium chloride 0.9 % infusion, 75 mL/hr, Intravenous, Continuous, Darien Guzman MD, Last Rate: 75 mL/hr at 07/13/19 0854, 75 mL/hr at 07/13/19 0854  •  venlafaxine XR (EFFEXOR-XR) 24 hr capsule 150 mg, 150 mg, Oral, BID, Darien Guzman MD, 150 mg at 07/13/19 0847    Results Review:  I have reviewed the labs, radiology results, and diagnostic studies.    Laboratory Data:   Results from last 7 days   Lab Units 07/13/19  0605 07/12/19  0927   SODIUM mmol/L 142 139   POTASSIUM mmol/L 3.9 3.5   CHLORIDE mmol/L 103 99   CO2 mmol/L 27.0 25.0   BUN mg/dL 13 16   CREATININE mg/dL 0.97 1.11*   GLUCOSE mg/dL 181* 190*   CALCIUM mg/dL 8.9 9.6   BILIRUBIN mg/dL 0.2 <0.2*   ALK PHOS U/L 125* 147*   ALT (SGPT) U/L 16 17   AST (SGOT) U/L 14 16   ANION GAP mmol/L 12.0 15.0     Estimated Creatinine Clearance: 86.8 mL/min (by C-G formula based on SCr of 0.97 mg/dL).          Results from last 7 days   Lab Units 07/13/19 0605 07/12/19  0927   WBC 10*3/mm3 8.25 12.72*   HEMOGLOBIN g/dL 12.6 12.5   HEMATOCRIT % 38.8 38.1   PLATELETS 10*3/mm3 370 396           Culture Data:   No results found for: BLOODCX  No results found for: URINECX  No results found for: RESPCX  No results found for: WOUNDCX  No results found for: STOOLCX  No components found for: BODYFLD    Radiology Data:   Imaging Results (last 24 hours)     Procedure Component Value Units Date/Time    CT Angiogram Coronary [[id number]] Updated:  07/13/19 1048          I have reviewed the patient's current medications.     Assessment/Plan     Hospital Problem  List:  Active Problems:    Chest pain: Patient remains asymptomatic, troponins were negative and EKG did not show any acute changes.  Continue guideline directed medical therapy and await outcome of CT coronary angiogram.  Cardiologist is following.  Echocardiogram showed:  · Left ventricular wall thickness is consistent with mild concentric hypertrophy.  · Estimated EF = 62%.  · Left ventricular systolic function is normal.  · Left ventricular diastolic dysfunction (grade I a) consistent with impaired relaxation.  · Left atrial cavity size is moderately dilated.  · There is calcification of the aortic valve mainly affecting the non coronary cusp(s).  · Tricuspid valve is grossly normal. Trace to mild tricuspid valve regurgitation is present. Estimated right ventricular systolic pressure from tricuspid regurgitation is mildly elevated (35-45 mmHg). Mild pulmonary hypertension is present.     Diabetes mellitus: Stable.  Continue anti-glycemic with Accu-Cheks and sliding scale insulin.    Hypertension: Continue current medications and make adjustments as needed for optimal blood pressure control.    Depressive disorder: Continue home medications.    Continue GI and DVT prophylaxis.    Discharge Planning: In progress.        Mat Peng MD   07/13/19   11:01 AM

## 2019-07-13 NOTE — CONSULTS
Adult Nutrition  Assessment    Patient Name:  Ariana Martinez  YOB: 1960  MRN: [id number]  Admit Date:  7/12/2019    Assessment Date:  7/13/2019    Comments:  BMI 39 with pt at 194% IBW.  Pt with dx Heart failure. Mild Sodium Resricted Diet info and Making Lifestyle Changes for a Healthier Weight used to provide Wt Loss Low Sodium Diet ed and diet copy given.    Reason for Assessment     Row Name 07/13/19 1702          Reason for Assessment    Reason For Assessment  per organizational policy Low Sodium Wt Loss Diet     Diagnosis  cardiac disease;other (see comments) dx Heart FAilure, dx Obesity     Identified At Risk by Screening Criteria  BMI;need for education                 Estimated/Assessed Needs     Row Name 07/13/19 1703          Calculation Measurements    Weight Used For Calculations  75.7 kg (166 lb 14.2 oz)        Estimated/Assessed Needs    Additional Documentation  Calorie Requirements (Group);Fluid Requirements (Group);Wells-St. Jeor Equation (Group);Protein Requirements (Group)        Calorie Requirements    Estimated Calorie Requirement (kcal/day)  1807     Estimated Calorie Need Method  Wells-St Jeor        Wells-St. Jeor Equation    RMR (Wells-St. Jeor Equation)  1390.5        Protein Requirements    Est Protein Requirement Amount (gms/kg)  1.2 gm protein     Estimated Protein Requirements (gms/day)  90.84        Fluid Requirements    Estimated Fluid Requirements (mL/day)  2273     RDA Method (mL)  2273     [place]-Segar Method (over 20 kg)  3014           Evaluation of Received Nutrient/Fluid Intake     Row Name 07/13/19 1703          Calculation Measurements    Weight Used For Calculations  75.7 kg (166 lb 14.2 oz)         Evaluation of Prescribed Nutrient/Fluid Intake     Row Name 07/13/19 1703          Calculation Measurements    Weight Used For Calculations  75.7 kg (166 lb 14.2 oz)             Electronically signed by:  Merle Morales RD  07/13/19 5:05 PM

## 2019-07-14 ENCOUNTER — READMISSION MANAGEMENT (OUTPATIENT)
Dept: CALL CENTER | Facility: HOSPITAL | Age: 59
End: 2019-07-14

## 2019-07-14 NOTE — OUTREACH NOTE
Prep Survey      Responses   Facility patient discharged from?  [facility]   Is patient eligible?  Yes   Discharge diagnosis  Chest pain, CT coronary angiogram   Does the patient have one of the following disease processes/diagnoses(primary or secondary)?  Other   Does the patient have Home health ordered?  No   Is there a DME ordered?  No   Comments regarding appointments  Office will call   Prep survey completed?  Yes          Елена Sow RN

## 2019-07-15 ENCOUNTER — APPOINTMENT (OUTPATIENT)
Dept: PHYSICAL THERAPY | Facility: HOSPITAL | Age: 59
End: 2019-07-15

## 2019-07-15 ENCOUNTER — READMISSION MANAGEMENT (OUTPATIENT)
Dept: CALL CENTER | Facility: HOSPITAL | Age: 59
End: 2019-07-15

## 2019-07-16 ENCOUNTER — APPOINTMENT (OUTPATIENT)
Dept: PHYSICAL THERAPY | Facility: HOSPITAL | Age: 59
End: 2019-07-16

## 2019-07-17 ENCOUNTER — APPOINTMENT (OUTPATIENT)
Dept: PHYSICAL THERAPY | Facility: HOSPITAL | Age: 59
End: 2019-07-17

## 2019-07-17 ENCOUNTER — READMISSION MANAGEMENT (OUTPATIENT)
Dept: CALL CENTER | Facility: HOSPITAL | Age: 59
End: 2019-07-17

## 2019-07-17 ENCOUNTER — TELEPHONE (OUTPATIENT)
Dept: FAMILY MEDICINE CLINIC | Facility: CLINIC | Age: 59
End: 2019-07-17

## 2019-07-17 RX ORDER — MIRTAZAPINE 45 MG/1
45 TABLET, FILM COATED ORAL NIGHTLY
Qty: 90 TABLET | Refills: 0 | Status: SHIPPED | OUTPATIENT
Start: 2019-07-17 | End: 2021-06-28 | Stop reason: DRUGHIGH

## 2019-07-17 RX ORDER — PEN NEEDLE, DIABETIC 31 GX5/16"
NEEDLE, DISPOSABLE MISCELLANEOUS
Qty: 120 EACH | Refills: 1 | Status: SHIPPED | OUTPATIENT
Start: 2019-07-17 | End: 2019-07-17 | Stop reason: SDUPTHER

## 2019-07-17 NOTE — TELEPHONE ENCOUNTER
Pt is needing a refill on short pen needles sent to [facility] in [place]. Thanks!

## 2019-07-17 NOTE — OUTREACH NOTE
Medical Week 1 Survey      Responses   Facility patient discharged from?  [facility]   Does the patient have one of the following disease processes/diagnoses(primary or secondary)?  Other   Is there a successful TCM telephone encounter documented?  No   Week 1 attempt successful?  Yes   Call start time  1203   Call end time  1211   Discharge diagnosis  Chest pain, CT coronary angiogram   Is patient permission given to speak with other caregiver?  Yes   List who call center can speak with     Meds reviewed with patient/caregiver?  Yes   Is the patient having any side effects they believe may be caused by any medication additions or changes?  No   Does the patient have all medications ordered at discharge?  Yes   Is the patient taking all medications as directed (includes completed medication regime)?  Yes   Does the patient have a primary care provider?   Yes   Does the patient have an appointment with their PCP within 7 days of discharge?  No   What is preventing the patient from scheduling follow up appointments within 7 days of discharge?  Haven't had time   Nursing Interventions  Educated patient on importance of making appointment, Advised patient to make appointment   Has the patient kept scheduled appointments due by today?  N/A   Psychosocial issues?  No   Comments  c/o same pain she had prior to coming to hospital, with exertion. She has not contacted MD, advised her to do so today.    Did the patient receive a copy of their discharge instructions?  Yes   Nursing interventions  Reviewed instructions with patient   What is the patient's perception of their health status since discharge?  Same   Is the patient/caregiver able to teach back signs and symptoms related to disease process for when to call PCP?  Yes   Is the patient/caregiver able to teach back signs and symptoms related to disease process for when to call 911?  Yes   Is the patient/caregiver able to teach back the hierarchy of who to  call/visit for symptoms/problems? PCP, Specialist, Home health nurse, Urgent Care, ED, 911  Yes   Week 1 call completed?  Yes          Caity Urbina RN

## 2019-07-19 ENCOUNTER — HOSPITAL ENCOUNTER (OUTPATIENT)
Dept: PHYSICAL THERAPY | Facility: HOSPITAL | Age: 59
Setting detail: THERAPIES SERIES
Discharge: HOME | End: 2019-07-19

## 2019-07-19 DIAGNOSIS — Q66.70 PES CAVUS: ICD-10-CM

## 2019-07-19 DIAGNOSIS — S92.355S CLOSED NONDISPLACED FRACTURE OF FIFTH METATARSAL BONE OF LEFT FOOT, SEQUELA: ICD-10-CM

## 2019-07-19 DIAGNOSIS — R26.81 UNSTEADY GAIT: Primary | ICD-10-CM

## 2019-07-19 PROCEDURE — 97164 PT RE-EVAL EST PLAN CARE: CPT | Performed by: PHYSICAL THERAPIST

## 2019-07-19 PROCEDURE — 97110 THERAPEUTIC EXERCISES: CPT | Performed by: PHYSICAL THERAPIST

## 2019-07-19 NOTE — THERAPY PROGRESS REPORT/RE-CERT
Outpatient Physical Therapy Ortho Progress Note  [facility]     Patient Name: Ariana Martinez  : 1960  MRN: [id number]  Today's Date: 2019      Visit Date: 2019  Attendance:  Subjective improvement:40%  Recert: 19  MD Appointment: 19   Visit Dx:      ICD-10-CM ICD-9-CM   1. Unsteady gait R26.81 781.2   2. Pes cavus Q66.7 736.73   3. Closed nondisplaced fracture of fifth metatarsal bone of left foot, sequela S92.355S 905.4       Patient Active Problem List   Diagnosis   • 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, with long-term current use of insulin (CMS/[facility])   • Closed nondisplaced fracture of fifth left metatarsal bone   • MAURICIO (generalized anxiety disorder)   • Depression, major, recurrent, moderate (CMS/HCC)   • GERD without esophagitis   • Long term prescription opiate use   • Mixed hyperlipidemia   • Vitamin D deficiency   • Seasonal allergic rhinitis   • Restrictive lung disease secondary to obesity   • Snoring   • Class 3 severe obesity due to excess calories without serious comorbidity with body mass index (BMI) of 40.0 to 44.9 in adult (CMS/HCC)   • (HFpEF) heart failure with preserved ejection fraction (CMS/HCC)   • Diabetic peripheral neuropathy associated with type 2 diabetes mellitus (CMS/HCC)   • Cyanocobalamin deficiency   • CAD (coronary artery disease)   • Hypertension   • Meniere's disease   • Gastroparesis   • Otitis media with effusion, left   • Pulmonary hypertension (CMS/HCC)   • Displaced fracture of fifth metatarsal bone, left foot, subsequent encounter for fracture with nonunion   • Pes cavus   • Primary osteoarthritis involving multiple joints   • Generalized anxiety disorder   • Chronic right-sided low back pain with right-sided sciatica   • Chest pain        Past Medical History:   Diagnosis Date   • Angina, class IV (CMS/HCC)    • Anxiety    • Benign paroxysmal positional vertigo    • Carpal tunnel syndrome    •  Chronic pain    • Coronary atherosclerosis     hx CABG 2005.  is followed by Dr Kwon   • Depression    • Diabetes mellitus (CMS/HCC)     Type 2, controlled   • Diabetic polyneuropathy (CMS/HCC)    • Elevated cholesterol    • Female stress incontinence    • Gastroparesis    • GERD (gastroesophageal reflux disease)    • Hyperlipidemia    • Hypertension    • Low back pain    • Malaise and fatigue    • Multiple joint pain    • Obesity     Refuses to be weighed   • Otalgia     Both   • Perforation of tympanic membrane     Left   • Postoperative wound infection    • Vitamin D deficiency         Past Surgical History:   Procedure Laterality Date   • ABDOMINAL SURGERY     • ANGIOPLASTY      coronary   • BREAST BIOPSY Right    • CARDIAC CATHETERIZATION     • CARDIAC CATHETERIZATION N/A 6/20/2017    Procedure: Right Heart Cath;  Surgeon: Can Kwon MD PhD;  Location: [facility] CATH INVASIVE LOCATION;  Service:    • CARPAL TUNNEL RELEASE     • CHOLECYSTECTOMY     • CORONARY ARTERY BYPASS GRAFT  2005   • ENDOSCOPY N/A 10/19/2018    Procedure: ESOPHAGOGASTRODUODENOSCOPY possible dilation;  Surgeon: Julián Maldonado MD;  Location: [facility] ENDOSCOPY;  Service: Gastroenterology   • ENDOSCOPY AND COLONOSCOPY     • FOOT SURGERY      Toes   • GASTRIC BANDING      Revision, laparoscopic   • HYSTERECTOMY     • MOUTH SURGERY     • SALPINGO OOPHORECTOMY     • SHOULDER SURGERY     • SUBTALAR ARTHRODESIS Left 1/16/2019    Procedure: LEFT FOOT HARDWARE REMOVAL, FIFTH METATARSAL , OPEN REDUCTION INTERNAL FIXATION, CALCANEAL OSTEOTOMY;  Surgeon: Ignacio Lord DPM;  Location: [facility] OR;  Service: Podiatry   • TRANSESOPHAGEAL ECHOCARDIOGRAM (LAMONTE)      With color flow   Objective : stiff in the morning.    PT Ortho     Row Name 07/19/19 0800       Posture/Observations    Posture/Observations Comments  TTP calcaneus.  -DD       General ROM    GENERAL ROM COMMENTS  DF 5, PF 35, Inv 20, Ev 4.  -DD       MMT (Manual Muscle Testing)     General MMT Comments  4+ in available ROM  -DD       Sensation    Additional Comments  Toes have no sensation with touch on the left.  -DD      User Key  (r) = Recorded By, (t) = Taken By, (c) = Cosigned By    Initials Name Provider Type    Lucero Stahl PT DPT Physical Therapist          PT Assessment/Plan     Row Name 07/19/19 0904          PT Assessment    Assessment Comments  Pt has not been to PT since last recert.  No significant changes toward goals.   MD next Tuesday.  Have him assess for custom orthotic for foot support.   -DD     Please refer to paper survey for additional self-reported information  Yes  -DD     Rehab Potential  Good  -DD     Patient/caregiver participated in establishment of treatment plan and goals  Yes  -DD     Patient would benefit from skilled therapy intervention  Yes  -DD        PT Plan    PT Frequency  2x/week  -DD     Predicted Duration of Therapy Intervention (Therapy Eval)  1 or per MD recommendation  -DD     PT Plan Comments  Balance and ROM.   -DD       User Key  (r) = Recorded By, (t) = Taken By, (c) = Cosigned By    Initials Name Provider Type    Lucero Stahl PT DPT Physical Therapist          Exercises     Row Name 07/19/19 0800             Subjective Comments    Subjective Comments  Was in hospital 2 days 70% blockage.   -DD         Subjective Pain    Able to rate subjective pain?  yes  -DD      Pre-Treatment Pain Level  4  -DD         Exercise 1    Exercise Name 1  PROM  -DD      Time 1  10'  -DD         Exercise 2    Exercise Name 2  PRO 2  -DD      Time 2  10  -DD         Exercise 3    Exercise Name 3  CR step  -DD      Reps 3  30  -DD         Exercise 4    Exercise Name 4  st TR  -DD      Reps 4  30  -DD         Exercise 5    Exercise Name 5  Tandem stance  -DD      Reps 5  multiple trials  -DD      Additional Comments  R/L 17,10,22 L/R 4,4,5,15  -DD         Exercise 6    Exercise Name 6  SLS  -DD      Reps 6  5  -DD      Additional  "Comments  2-3 sec each trial  -DD         Exercise 7    Exercise Name 7  Diagonal wt shifts  -DD      Time 7  3  -DD         Exercise 8    Exercise Name 8  Air X NBOS standing  -DD      Time 8  3'  -DD         Exercise 9    Exercise Name 9  step ups  -DD      Reps 9  20  -DD      Additional Comments  4\"  -DD        User Key  (r) = Recorded By, (t) = Taken By, (c) = Cosigned By    Initials Name Provider Type    Lucero Stahl, PT DPT Physical Therapist            PT OP Goals     Row Name 07/19/19 0940 07/19/19 0840       PT Short Term Goals    STG Date to Achieve  --  06/11/19  -DD    STG 1  --  Patient will be independent home exercise program  -DD    STG 1 Progress  --  Met  -DD    STG 2  --  Patient will have dorsiflexion 7 degrees  -DD    STG 2 Progress  --  Progressing  -DD    STG 2 Progress Comments  --  5  -DD    STG 3  --  Patient plantarflexion 35 degrees  -DD    STG 3 Progress  --  Met  -DD    STG 3 Progress Comments  --  35  -DD    STG 4  --  Patient will have eversion 15 degrees  -DD    STG 4 Progress  --  Ongoing  -DD    STG 4 Progress Comments  --  4  -DD    STG 5  --  Patient will have inversion 25 degrees  -DD    STG 5 Progress  --  Progressing  -DD    STG 5 Progress Comments  --  20  -DD       Long Term Goals    LTG 1  --  Patient had minimal tenderness to palpation of the posterior calcaneus  -DD    LTG 1 Progress  --  Progressing  -DD    LTG 2  --  Patient has minimal tenderness to palpation of the ATF  -DD    LTG 2 Progress  --  Met  -DD    LTG 3  --  Patient to have single leg stance on the left to 6 seconds  -DD    LTG 3 Progress  --  Ongoing  -DD       Time Calculation    PT Goal Re-Cert Due Date  08/09/19  -DD  --      User Key  (r) = Recorded By, (t) = Taken By, (c) = Cosigned By    Initials Name Provider Type    Lucero Stahl, PT DPT Physical Therapist               Outcome Measure Options: Lower Extremity Functional Scale (LEFS)  Lower Extremity Functional Index  Any " of your usual work, housework or school activities: Moderate difficulty  Your usual hobbies, recreational or sporting activities: Quite a bit of difficulty  Getting into or out of the bath: Moderate difficulty  Walking between rooms: A little bit of difficulty  Putting on your shoes or socks: No difficulty  Squatting: Moderate difficulty  Lifting an object, like a bag of groceries from the floor: A little bit of difficulty  Performing light activities around your home: No difficulty  Performing heavy activities around your home: Moderate difficulty  Getting into or out of a car: No difficulty  Walking 2 blocks: Quite a bit of difficulty  Walking a mile: Extreme difficulty or unable to perform activity  Going up or down 10 stairs (about 1 flight of stairs): Moderate difficulty  Standing for 1 hour: Extreme difficulty or unable to perform activity  Sitting for 1 hour: Moderate difficulty  Running on even ground: Extreme difficulty or unable to perform activity  Running on uneven ground: Extreme difficulty or unable to perform activity  Making sharp turns while running fast: Extreme difficulty or unable to perform activity  Hopping: Extreme difficulty or unable to perform activity  Rolling over in bed: No difficulty  Total: 36      Time Calculation:   Start Time: 0840  Stop Time: 0924  Time Calculation (min): 44 min  Total Timed Code Minutes- PT: 44 minute(s)  Therapy Charges for Today     Code Description Service Date Service Provider Modifiers Qty    69636301601 HC PT RE-EVAL ESTABLISHED PLAN 2 7/19/2019 Lucero Camilo PT DPT GP 1    [id number]  PT THER PROC EA 15 MIN 7/19/2019 Lucero Camilo PT DPT GP 2          PT G-Codes  Outcome Measure Options: Lower Extremity Functional Scale (LEFS)  Total: 36         Lucero Camilo PT DPT, ATC  7/19/2019

## 2019-07-22 ENCOUNTER — HOSPITAL ENCOUNTER (OUTPATIENT)
Dept: PHYSICAL THERAPY | Facility: HOSPITAL | Age: 59
Setting detail: THERAPIES SERIES
Discharge: HOME | End: 2019-07-22

## 2019-07-22 DIAGNOSIS — Q66.70 PES CAVUS: ICD-10-CM

## 2019-07-22 DIAGNOSIS — R26.81 UNSTEADY GAIT: Primary | ICD-10-CM

## 2019-07-22 DIAGNOSIS — S92.355S CLOSED NONDISPLACED FRACTURE OF FIFTH METATARSAL BONE OF LEFT FOOT, SEQUELA: ICD-10-CM

## 2019-07-22 PROCEDURE — 97110 THERAPEUTIC EXERCISES: CPT

## 2019-07-22 NOTE — THERAPY TREATMENT NOTE
Outpatient Physical Therapy Ortho Treatment Note  [facility]     Patient Name: Ariana Martinez  : 1960  MRN: [id number]  Today's Date: 2019      Visit Date: 2019   Attendance: 10/20 of 90  Subjective improvement: 40%  Recert: 19  MD Appointment: 19      Visit Dx:    ICD-10-CM ICD-9-CM   1. Unsteady gait R26.81 781.2   2. Pes cavus Q66.7 736.73   3. Closed nondisplaced fracture of fifth metatarsal bone of left foot, sequela S92.355S 905.4       Patient Active Problem List   Diagnosis   • 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, with long-term current use of insulin (CMS/[facility])   • Closed nondisplaced fracture of fifth left metatarsal bone   • MAURICIO (generalized anxiety disorder)   • Depression, major, recurrent, moderate (CMS/HCC)   • GERD without esophagitis   • Long term prescription opiate use   • Mixed hyperlipidemia   • Vitamin D deficiency   • Seasonal allergic rhinitis   • Restrictive lung disease secondary to obesity   • Snoring   • Class 3 severe obesity due to excess calories without serious comorbidity with body mass index (BMI) of 40.0 to 44.9 in adult (CMS/HCC)   • (HFpEF) heart failure with preserved ejection fraction (CMS/HCC)   • Diabetic peripheral neuropathy associated with type 2 diabetes mellitus (CMS/HCC)   • Cyanocobalamin deficiency   • CAD (coronary artery disease)   • Hypertension   • Meniere's disease   • Gastroparesis   • Otitis media with effusion, left   • Pulmonary hypertension (CMS/HCC)   • Displaced fracture of fifth metatarsal bone, left foot, subsequent encounter for fracture with nonunion   • Pes cavus   • Primary osteoarthritis involving multiple joints   • Generalized anxiety disorder   • Chronic right-sided low back pain with right-sided sciatica   • Chest pain        Past Medical History:   Diagnosis Date   • Angina, class IV (CMS/HCC)    • Anxiety    • Benign paroxysmal positional vertigo    • Carpal tunnel  syndrome    • Chronic pain    • Coronary atherosclerosis     hx CABG 2005.  is followed by Dr Kwon   • Depression    • Diabetes mellitus (CMS/HCC)     Type 2, controlled   • Diabetic polyneuropathy (CMS/HCC)    • Elevated cholesterol    • Female stress incontinence    • Gastroparesis    • GERD (gastroesophageal reflux disease)    • Hyperlipidemia    • Hypertension    • Low back pain    • Malaise and fatigue    • Multiple joint pain    • Obesity     Refuses to be weighed   • Otalgia     Both   • Perforation of tympanic membrane     Left   • Postoperative wound infection    • Vitamin D deficiency         Past Surgical History:   Procedure Laterality Date   • ABDOMINAL SURGERY     • ANGIOPLASTY      coronary   • BREAST BIOPSY Right    • CARDIAC CATHETERIZATION     • CARDIAC CATHETERIZATION N/A 6/20/2017    Procedure: Right Heart Cath;  Surgeon: Can Kwon MD PhD;  Location: [facility] CATH INVASIVE LOCATION;  Service:    • CARPAL TUNNEL RELEASE     • CHOLECYSTECTOMY     • CORONARY ARTERY BYPASS GRAFT  2005   • ENDOSCOPY N/A 10/19/2018    Procedure: ESOPHAGOGASTRODUODENOSCOPY possible dilation;  Surgeon: Julián Maldonado MD;  Location: [facility] ENDOSCOPY;  Service: Gastroenterology   • ENDOSCOPY AND COLONOSCOPY     • FOOT SURGERY      Toes   • GASTRIC BANDING      Revision, laparoscopic   • HYSTERECTOMY     • MOUTH SURGERY     • SALPINGO OOPHORECTOMY     • SHOULDER SURGERY     • SUBTALAR ARTHRODESIS Left 1/16/2019    Procedure: LEFT FOOT HARDWARE REMOVAL, FIFTH METATARSAL , OPEN REDUCTION INTERNAL FIXATION, CALCANEAL OSTEOTOMY;  Surgeon: Ignacio Lord DPM;  Location: [facility] OR;  Service: Podiatry   • TRANSESOPHAGEAL ECHOCARDIOGRAM (LAMONTE)      With color flow       PT Ortho     Row Name 07/22/19 0800       Posture/Observations    Posture/Observations Comments  Amb with mild antalgic gt. Mild TTP L calcaneus noted.   -EM      User Key  (r) = Recorded By, (t) = Taken By, (c) = Cosigned By    Initials Name  "Provider Type    EM Barry Gamboa PTA Physical Therapy Assistant                      PT Assessment/Plan     Row Name 07/22/19 0800          PT Assessment    Functional Limitations  Decreased safety during functional activities;Impaired gait;Limitation in home management;Performance in leisure activities;Limitations in community activities  -EM     Impairments  Endurance;Gait;Balance;Range of motion;Muscle strength  -EM     Assessment Comments  Pt becki tx well with introduction of  airex into tandem stance. Pt still unable to perform SLS on L LE.  No goals met this tx, however, pt feels she is improving with PT overall.   -EM     Rehab Potential  Good  -EM     Patient/caregiver participated in establishment of treatment plan and goals  Yes  -EM     Patient would benefit from skilled therapy intervention  Yes  -EM        PT Plan    PT Frequency  2x/week  -EM     PT Plan Comments  MD note next tx, consider custom shoe orthotics.   (Significant)   -EM       User Key  (r) = Recorded By, (t) = Taken By, (c) = Cosigned By    Initials Name Provider Type    EM Barry Gamboa PTA Physical Therapy Assistant            Exercises     Row Name 07/22/19 0800             Subjective Comments    Subjective Comments  Pt states she is doing alright. She states \"Mornings are rough\"   -EM         Subjective Pain    Able to rate subjective pain?  yes  -EM      Pre-Treatment Pain Level  4  -EM      Post-Treatment Pain Level  5  -EM         Exercise 1    Exercise Name 1  PRO II-Peak-4.0  -EM      Time 1  10'  -EM         Exercise 2    Exercise Name 2  PROM  -EM      Time 2  8'  -EM         Exercise 3    Exercise Name 3  Incline Calf S  -EM      Sets 3  3  -EM      Time 3  30\"  -EM         Exercise 4    Exercise Name 4  Offstep 3 Way CR: IR, neutral, ER  -EM      Sets 4  1  -EM      Reps 4  20  -EM      Additional Comments  Difficulty noted with ER  -EM         Exercise 5    Exercise Name 5  Tandem Stance on Airex:  L LE fwd  -EM      " "Sets 5  1  -EM      Reps 5  3  -EM      Additional Comments  6\", 22\", 20\"  -EM         Exercise 6    Exercise Name 6  Tandem Stance on Airex: R LE Fwd  -EM      Sets 6  1  -EM      Reps 6  3  -EM      Additional Comments  35\", 6\", 16\"  -EM         Exercise 7    Exercise Name 7  ST TR  -EM      Sets 7  1  -EM      Reps 7  30  -EM         Exercise 8    Exercise Name 8  MS on Airex  -EM      Sets 8  1  -EM      Reps 8  30  -EM        User Key  (r) = Recorded By, (t) = Taken By, (c) = Cosigned By    Initials Name Provider Type    EM Barry Gamboa, MANJINDER Physical Therapy Assistant                      Manual Rx (last 36 hours)      Manual Treatments     Row Name 07/22/19 0700             Manual Rx 1    Manual Rx 1 Location  L ankle  -EM      Manual Rx 1 Type  PROM: PF,DF, IV, EV  -EM      Manual Rx 1 Duration  8'  -EM        User Key  (r) = Recorded By, (t) = Taken By, (c) = Cosigned By    Initials Name Provider Type    EM Barry Gamboa, MANJINDER Physical Therapy Assistant          PT OP Goals     Row Name 07/22/19 0800          PT Short Term Goals    STG Date to Achieve  06/11/19  -EM     STG 1  Patient will be independent home exercise program  -EM     STG 1 Progress  Met  (Significant)   -EM     STG 2  Patient will have dorsiflexion 7 degrees  -EM     STG 2 Progress  Progressing  -EM     STG 3  Patient plantarflexion 35 degrees  -EM     STG 3 Progress  Met  (Significant)   -EM     STG 4  Patient will have eversion 15 degrees  -EM     STG 4 Progress  Ongoing  -EM     STG 5  Patient will have inversion 25 degrees  -EM     STG 5 Progress  Progressing  -EM        Long Term Goals    LTG 1  Patient had minimal tenderness to palpation of the posterior calcaneus  -EM     LTG 1 Progress  Progressing  -EM     LTG 2  Patient has minimal tenderness to palpation of the ATF  -EM     LTG 2 Progress  Met  (Significant)   -EM     LTG 3  Patient to have single leg stance on the left to 6 seconds  -EM     LTG 3 Progress  Ongoing  -EM        " Time Calculation    PT Goal Re-Cert Due Date  08/09/19  -EM       User Key  (r) = Recorded By, (t) = Taken By, (c) = Cosigned By    Initials Name Provider Type    EM Barry Gamboa PTA Physical Therapy Assistant                         Time Calculation:   Start Time: 0804  Stop Time: 0843  Time Calculation (min): 39 min  Total Timed Code Minutes- PT: 39 minute(s)  Therapy Charges for Today     Code Description Service Date Service Provider Modifiers Qty    [id number] HC PT THER PROC EA 15 MIN 7/22/2019 Barry Gamboa PTA GP 3                    Barry Gamboa PTA  7/22/2019

## 2019-07-23 ENCOUNTER — OFFICE VISIT (OUTPATIENT)
Dept: PODIATRY | Facility: CLINIC | Age: 59
End: 2019-07-23

## 2019-07-23 ENCOUNTER — HOSPITAL ENCOUNTER (OUTPATIENT)
Dept: PHYSICAL THERAPY | Facility: HOSPITAL | Age: 59
Setting detail: THERAPIES SERIES
Discharge: HOME | End: 2019-07-23

## 2019-07-23 VITALS — HEART RATE: 78 BPM | WEIGHT: 262 LBS | BODY MASS INDEX: 39.71 KG/M2 | HEIGHT: 68 IN | OXYGEN SATURATION: 97 %

## 2019-07-23 DIAGNOSIS — R26.81 UNSTEADY GAIT: Primary | ICD-10-CM

## 2019-07-23 DIAGNOSIS — E11.42 DIABETIC POLYNEUROPATHY ASSOCIATED WITH TYPE 2 DIABETES MELLITUS (HCC): ICD-10-CM

## 2019-07-23 DIAGNOSIS — Q66.70 PES CAVUS: ICD-10-CM

## 2019-07-23 DIAGNOSIS — S92.032P: ICD-10-CM

## 2019-07-23 DIAGNOSIS — S92.352K DISPLACED FRACTURE OF FIFTH METATARSAL BONE, LEFT FOOT, SUBSEQUENT ENCOUNTER FOR FRACTURE WITH NONUNION: Primary | ICD-10-CM

## 2019-07-23 DIAGNOSIS — R52 PAIN: ICD-10-CM

## 2019-07-23 DIAGNOSIS — S92.355S CLOSED NONDISPLACED FRACTURE OF FIFTH METATARSAL BONE OF LEFT FOOT, SEQUELA: ICD-10-CM

## 2019-07-23 PROCEDURE — 97110 THERAPEUTIC EXERCISES: CPT | Performed by: PHYSICAL THERAPIST

## 2019-07-23 PROCEDURE — 99213 OFFICE O/P EST LOW 20 MIN: CPT | Performed by: PODIATRIST

## 2019-07-23 NOTE — PROGRESS NOTES
Ariana Luis Martinez  1960  59 y.o. female   WILMAR Fong - 06/07/19  BS - 128 this morning per patient     Patient presents today for a follow up on the left foot.    07/23/2019      Chief Complaint   Patient presents with   • Left Foot - Follow-up       History of Present Illness    Ms Martinez is a 58 y/o diabetic female who presents for f/u left foot revision fifth metatarsal fracture nonunion and Rich calcaneal osteotomy.  Date of surgery 1/16/2019.   In her postoperative course she did fall on postoperative week 1 causing an avulsion fracture of her calcaneal osteotomy.  We treated this conservatively and monitoring with serial x-rays.  She has returned to regular shoes full-time.  She has been in physical therapy since last visit.  She does continue to note pain and instability with prolonged walking.  She does feel her balance is overall somewhat improved.    Past Medical History:   Diagnosis Date   • Angina, class IV (CMS/HCC)    • Anxiety    • Benign paroxysmal positional vertigo    • Carpal tunnel syndrome    • Chronic pain    • Coronary atherosclerosis     hx CABG 2005.  is followed by Dr Kwon   • Depression    • Diabetes mellitus (CMS/HCC)     Type 2, controlled   • Diabetic polyneuropathy (CMS/HCC)    • Elevated cholesterol    • Female stress incontinence    • Gastroparesis    • GERD (gastroesophageal reflux disease)    • Hyperlipidemia    • Hypertension    • Low back pain    • Malaise and fatigue    • Multiple joint pain    • Obesity     Refuses to be weighed   • Otalgia     Both   • Perforation of tympanic membrane     Left   • Postoperative wound infection    • Vitamin D deficiency          Past Surgical History:   Procedure Laterality Date   • ABDOMINAL SURGERY     • ANGIOPLASTY      coronary   • BREAST BIOPSY Right    • CARDIAC CATHETERIZATION     • CARDIAC CATHETERIZATION N/A 6/20/2017    Procedure: Right Heart Cath;  Surgeon: Can Kwon MD PhD;  Location: [facility] INVASIVE  LOCATION;  Service:    • CARPAL TUNNEL RELEASE     • CHOLECYSTECTOMY     • CORONARY ARTERY BYPASS GRAFT  2005   • ENDOSCOPY N/A 10/19/2018    Procedure: ESOPHAGOGASTRODUODENOSCOPY possible dilation;  Surgeon: Julián Maldonado MD;  Location: [facility] ENDOSCOPY;  Service: Gastroenterology   • ENDOSCOPY AND COLONOSCOPY     • FOOT SURGERY      Toes   • GASTRIC BANDING      Revision, laparoscopic   • HYSTERECTOMY     • MOUTH SURGERY     • SALPINGO OOPHORECTOMY     • SHOULDER SURGERY     • SUBTALAR ARTHRODESIS Left 1/16/2019    Procedure: LEFT FOOT HARDWARE REMOVAL, FIFTH METATARSAL , OPEN REDUCTION INTERNAL FIXATION, CALCANEAL OSTEOTOMY;  Surgeon: Ignacio Lord DPM;  Location: [facility] OR;  Service: Podiatry   • TRANSESOPHAGEAL ECHOCARDIOGRAM (LAMONTE)      With color flow         Family History   Problem Relation Age of Onset   • Diabetes Other    • Heart disease Other    • Hypertension Other    • Heart disease Mother    • Stroke Mother    • Hypertension Mother    • Diabetes Sister    • Heart disease Sister    • Hypertension Sister    • Heart disease Sister    • Diabetes Sister    • Hypertension Sister    • Diabetes Sister    • Diabetes Sister    • Diabetes Sister    • Diabetes Sister          Social History     Socioeconomic History   • Marital status:      Spouse name: Not on file   • Number of children: Not on file   • Years of education: Not on file   • Highest education level: Not on file   Tobacco Use   • Smoking status: Never Smoker   • Smokeless tobacco: Never Used   Substance and Sexual Activity   • Alcohol use: No   • Drug use: No   • Sexual activity: Defer         Current Outpatient Medications   Medication Sig Dispense Refill   • ARIPiprazole (ABILIFY) 15 MG tablet Take 1 tablet by mouth Daily. 90 tablet 1   • aspirin 81 MG chewable tablet Chew 81 mg daily.     • atorvastatin (LIPITOR) 40 MG tablet Take 1 tablet by mouth Every Night. 90 tablet 1   • BD SHARPS CONTAINER HOME misc 1 each Take As Directed.  1 each 0   • Blood Glucose Monitoring Suppl (ONE TOUCH ULTRA MINI) w/Device kit USE AS DIRECTED TO CHECK BLOOD SUGAR 1 each 0   • Calcium Citrate-Vitamin D (CITRACAL/VITAMIN D) 250-200 MG-UNIT tablet Take 2 tablets by mouth 2 (two) times a day.     • clopidogrel (PLAVIX) 75 MG tablet Take 75 mg by mouth Daily.     • cyanocobalamin 1000 MCG/ML injection Inject 1 mL into the appropriate muscle as directed by prescriber Every 28 (Twenty-Eight) Days. 1 mL 0   • furosemide (LASIX) 40 MG tablet Take 1 tablet by mouth Daily. 90 tablet 1   • gabapentin (NEURONTIN) 400 MG capsule TAKE 1 CAPSULE BY MOUTH THREE TIMES A DAY 90 capsule 2   • GLUCAGON EMERGENCY 1 MG injection USE AS DIRECTED AS NEEDED 1 kit 0   • glucose blood (ONE TOUCH ULTRA TEST) test strip 1 each by Other route 4 (Four) Times a Day. Use as instructed 400 each 1   • hydrochlorothiazide (MICROZIDE) 12.5 MG capsule TAKE 1 CAPSULE BY MOUTH DAILY. 90 capsule 0   • HYDROcodone-acetaminophen (NORCO) 7.5-325 MG per tablet Take 1 tablet by mouth Every 4 (Four) Hours As Needed for Moderate Pain  or Severe Pain . 180 tablet 0   • insulin aspart (novoLOG FLEXPEN) 100 UNIT/ML solution pen-injector sc pen Inject 60 Units under the skin into the appropriate area as directed 3 (Three) Times a Day With Meals.     • insulin aspart (NOVOLOG FLEXPEN) 100 UNIT/ML solution pen-injector sc pen INJECT 60 UNITS BEFORE MEALS 3 TIMES A DAY 5 pen 5   • Insulin Glargine (BASAGLAR KWIKPEN) 100 UNIT/ML injection pen Inject 100 Units under the skin into the appropriate area as directed Every Night. 5 pen 5   • Insulin Pen Needle (B-D ULTRAFINE III SHORT PEN) 31G X 8 MM misc USE TO INJECT 4 TIMES A  each 11   • isosorbide mononitrate (IMDUR) 30 MG 24 hr tablet Take 1 tablet by mouth Daily. 30 tablet 1   • LORazepam (ATIVAN) 0.5 MG tablet Take 1 tablet by mouth Daily As Needed for Anxiety. Fill when due 30 tablet 0   • meclizine (ANTIVERT) 25 MG tablet Take 1 tablet by mouth 3 (Three)  "Times a Day As Needed for dizziness. 90 tablet 6   • meloxicam (MOBIC) 15 MG tablet Take 1 tablet by mouth Daily. Take once daily. 30 tablet 0   • metoclopramide (REGLAN) 10 MG tablet Take 10 mg by mouth 4 (Four) Times a Day As Needed.     • metoprolol succinate XL (TOPROL-XL) 25 MG 24 hr tablet TAKE 1 TABLET BY MOUTH DAILY. 31 tablet 6   • mirtazapine (REMERON) 45 MG tablet TAKE 1 TABLET BY MOUTH EVERY NIGHT. 90 tablet 0   • omeprazole (PRILOSEC) 40 MG capsule Take 1 capsule by mouth Daily. 90 capsule 1   • ONE TOUCH ULTRA TEST test strip USE TO TEST SUGAR 4 TIMES A  each 3   • phenazopyridine (PYRIDIUM) 200 MG tablet Take 1 tablet by mouth 3 (Three) Times a Day As Needed for bladder spasms. 21 tablet 0   • ranolazine (RANEXA) 500 MG 12 hr tablet Take 1 tablet by mouth Every 12 (Twelve) Hours. 60 tablet 1   • Syringe/Needle, Disp, (LUER LOCK SAFETY SYRINGES) 25G X 5/8\" 3 ML misc 1 each Daily. 100 each 0   • venlafaxine XR (EFFEXOR-XR) 150 MG 24 hr capsule Take 1 capsule by mouth 2 (Two) Times a Day. 60 capsule 2   • vitamin D (ERGOCALCIFEROL) 33130 units capsule capsule Take 50,000 Units by mouth 1 (One) Time Per Week. sunday       No current facility-administered medications for this visit.      Review of Systems   Constitutional: Negative.    HENT: Negative.    Eyes: Negative.    Respiratory: Negative.    Cardiovascular: Negative.    Gastrointestinal: Negative.    Endocrine: Negative.    Genitourinary: Negative.    Musculoskeletal:        Left foot pain    Skin: Negative.    Allergic/Immunologic: Negative.    Neurological: Positive for numbness.   Psychiatric/Behavioral: Negative.          OBJECTIVE    Pulse 78   Ht 172.7 cm (68\")   Wt 119 kg (262 lb)   SpO2 97%   BMI 39.84 kg/m²         Physical Exam   Constitutional: she appears well-developed and well-nourished.   Psychiatric: she has a normal mood and affect. her   behavior is normal.      Lower Extremity Exam:  Vascular: DP pulses palpable 2+. PT " not readily palpable. +signal on doppler  Negative hair growth.   Mild left foot edema  Negative Conrad  Neuro: Protective sensation absent to foot, b/l. Reestablished at mid calf   DTRs intact  Vibratory sensation diminished.  Integument: Left foot incisions well healed.  No signs of infection  No skin tenting is noted to the left heel  Right lateral malleolus ulceration healed  Diffuse xerosis b/l.  Webspaces c/d/i  Musculoskeletal: Left ankle range of motion intact  Mild Tenderness to palpation of left lateral 5th metatarsal.    Mild tenderness to lateral calcaneal body  Achilles tendon intact.         Procedures        ASSESSMENT AND PLAN    Ariana was seen today for follow-up.    Diagnoses and all orders for this visit:    Displaced fracture of fifth metatarsal bone, left foot, subsequent encounter for fracture with nonunion  -     CT FOOT LEFT WITHOUT CONTRAST; Future    Pain  -     XR Foot 3+ View Left    Pes cavus    Closed displaced avulsion fracture of tuberosity of left calcaneus with malunion, subsequent encounter  -     CT FOOT LEFT WITHOUT CONTRAST; Future    Diabetic polyneuropathy associated with type 2 diabetes mellitus (CMS/HCC)        -Patient is doing well overall spite mild residual pain and instability.  -Radiographs ordered and reviewed.  Calcaneal osteotomy/fracture appears healed with malunion.  Will obtain CT to evaluate healing of metatarsal fracture and calcaneal osteotomy site.  Consider possible subtalar joint arthrodesis.  -Continue physical therapy for strengthening and gait training.  Will write for custom orthotics as well.  -Did briefly discussed possibility of future calcaneal exostectomy versus revision to subtalar joint arthrodesis if necessary in the future.   -Recheck following CT          This document has been electronically signed by Ignacio Lord DPM on July 24, 2019 4:30 PM

## 2019-07-23 NOTE — THERAPY TREATMENT NOTE
Outpatient Physical Therapy Ortho Treatment Note  [facility]     Patient Name: Ariana Martinez  : 1960  MRN: [id number]  Today's Date: 2019      Visit Date: 2019  Attendance:   Subjective improvement: 40%  Recert: 19  MD Appointment: 19     Visit Dx:    ICD-10-CM ICD-9-CM   1. Unsteady gait R26.81 781.2   2. Pes cavus Q66.7 736.73   3. Closed nondisplaced fracture of fifth metatarsal bone of left foot, sequela S92.355S 905.4       Patient Active Problem List   Diagnosis   • 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, with long-term current use of insulin (CMS/[facility])   • Closed nondisplaced fracture of fifth left metatarsal bone   • MAURICIO (generalized anxiety disorder)   • Depression, major, recurrent, moderate (CMS/HCC)   • GERD without esophagitis   • Long term prescription opiate use   • Mixed hyperlipidemia   • Vitamin D deficiency   • Seasonal allergic rhinitis   • Restrictive lung disease secondary to obesity   • Snoring   • Class 3 severe obesity due to excess calories without serious comorbidity with body mass index (BMI) of 40.0 to 44.9 in adult (CMS/HCC)   • (HFpEF) heart failure with preserved ejection fraction (CMS/HCC)   • Diabetic peripheral neuropathy associated with type 2 diabetes mellitus (CMS/HCC)   • Cyanocobalamin deficiency   • CAD (coronary artery disease)   • Hypertension   • Meniere's disease   • Gastroparesis   • Otitis media with effusion, left   • Pulmonary hypertension (CMS/HCC)   • Displaced fracture of fifth metatarsal bone, left foot, subsequent encounter for fracture with nonunion   • Pes cavus   • Primary osteoarthritis involving multiple joints   • Generalized anxiety disorder   • Chronic right-sided low back pain with right-sided sciatica   • Chest pain        Past Medical History:   Diagnosis Date   • Angina, class IV (CMS/HCC)    • Anxiety    • Benign paroxysmal positional vertigo    • Carpal tunnel syndrome     • Chronic pain    • Coronary atherosclerosis     hx CABG 2005.  is followed by Dr Kwon   • Depression    • Diabetes mellitus (CMS/HCC)     Type 2, controlled   • Diabetic polyneuropathy (CMS/HCC)    • Elevated cholesterol    • Female stress incontinence    • Gastroparesis    • GERD (gastroesophageal reflux disease)    • Hyperlipidemia    • Hypertension    • Low back pain    • Malaise and fatigue    • Multiple joint pain    • Obesity     Refuses to be weighed   • Otalgia     Both   • Perforation of tympanic membrane     Left   • Postoperative wound infection    • Vitamin D deficiency         Past Surgical History:   Procedure Laterality Date   • ABDOMINAL SURGERY     • ANGIOPLASTY      coronary   • BREAST BIOPSY Right    • CARDIAC CATHETERIZATION     • CARDIAC CATHETERIZATION N/A 6/20/2017    Procedure: Right Heart Cath;  Surgeon: Can Kwon MD PhD;  Location: [facility] CATH INVASIVE LOCATION;  Service:    • CARPAL TUNNEL RELEASE     • CHOLECYSTECTOMY     • CORONARY ARTERY BYPASS GRAFT  2005   • ENDOSCOPY N/A 10/19/2018    Procedure: ESOPHAGOGASTRODUODENOSCOPY possible dilation;  Surgeon: Julián Maldonado MD;  Location: [facility] ENDOSCOPY;  Service: Gastroenterology   • ENDOSCOPY AND COLONOSCOPY     • FOOT SURGERY      Toes   • GASTRIC BANDING      Revision, laparoscopic   • HYSTERECTOMY     • MOUTH SURGERY     • SALPINGO OOPHORECTOMY     • SHOULDER SURGERY     • SUBTALAR ARTHRODESIS Left 1/16/2019    Procedure: LEFT FOOT HARDWARE REMOVAL, FIFTH METATARSAL , OPEN REDUCTION INTERNAL FIXATION, CALCANEAL OSTEOTOMY;  Surgeon: Ignacio Lord DPM;  Location: [facility] OR;  Service: Podiatry   • TRANSESOPHAGEAL ECHOCARDIOGRAM (LAMONTE)      With color flow       PT Ortho     Row Name 07/22/19 0800       Posture/Observations    Posture/Observations Comments  Amb with mild antalgic gt. Mild TTP L calcaneus noted.   -EM      User Key  (r) = Recorded By, (t) = Taken By, (c) = Cosigned By    Initials Name Provider  "Type    Barry Bhatt, PTA Physical Therapy Assistant          PT Assessment/Plan     Row Name 07/23/19 0842          PT Assessment    Assessment Comments  Did well with exercises, MS made her sore last time and she didn't want to do them. Wrote MD note for appt today, hand delivered and discussed orthtotics.  -DD     Rehab Potential  Good  -DD     Patient/caregiver participated in establishment of treatment plan and goals  Yes  -DD     Patient would benefit from skilled therapy intervention  Yes  -DD        PT Plan    PT Frequency  2x/week  -DD     Predicted Duration of Therapy Intervention (Therapy Eval)  2 weeks  -DD     Planned CPT's?  PT EVAL MOD COMPLELITY: 03768;PT THER PROC EA 15 MIN: 41328;PT GAIT TRAINING EA 15 MIN: 41739;PT ORTHOTIC MGMT/TRAIN EA 15 MIN: 23150  -DD     PT Plan Comments  balance training, review tband.  Mold for custom orthotics.  -DD       User Key  (r) = Recorded By, (t) = Taken By, (c) = Cosigned By    Initials Name Provider Type    DD Lucero Camilo, PT DPT Physical Therapist            Exercises     Row Name 07/23/19 0753 07/23/19 0700          Subjective Comments    Subjective Comments  --  No new complaints, pain is worse in the mornings. Headache today.  -DD        Subjective Pain    Able to rate subjective pain?  --  yes  -DD     Pre-Treatment Pain Level  --  3  -DD        Exercise 1    Exercise Name 1  PRO II-Peak- L4.0  -DD  --     Time 1  10'  -DD  --     Additional Comments  seat 7  -DD  --        Exercise 2    Exercise Name 2  PROM  -DD  --     Time 2  5'  -DD  --        Exercise 3    Exercise Name 3  Incline Calf S  -DD  --     Sets 3  3  -DD  --     Time 3  30\"  -DD  --        Exercise 4    Exercise Name 4  Offstep 3 Way CR: IR, neutral, ER  -DD  --     Sets 4  1  -DD  --     Reps 4  20  -DD  --        Exercise 5    Exercise Name 5  Tandem Stance on Airex  -DD  --     Sets 5  1  -DD  --     Reps 5  3  -DD  --     Additional Comments  L/R  -DD  --        Exercise " 6    Exercise Name 6  Tandem Stance on Airex  -DD  --     Sets 6  1  -DD  --     Reps 6  3  -DD  --     Additional Comments  R/L  -DD  --        Exercise 7    Exercise Name 7  SLS  -DD  --     Reps 7  multi trials  -DD  --     Time 7  3'  -DD  --     Additional Comments  3 sec best/ R 15 sec  -DD  --        Exercise 8    Exercise Name 8  MS on Airex  -DD  --     Sets 8  --  -DD  --     Reps 8  --  -DD  --     Additional Comments  Pt declined legs sore after lst time  -DD  --        Exercise 9    Exercise Name 9  NBOS eyes closed  -DD  --     Time 9  3'  -DD  --     Additional Comments  15 sec best  -DD  --       User Key  (r) = Recorded By, (t) = Taken By, (c) = Cosigned By    Initials Name Provider Type    Lucero Stahl, PT DPT Physical Therapist              Manual Rx (last 36 hours)      Manual Treatments     Row Name 07/22/19 0700             Manual Rx 1    Manual Rx 1 Location  L ankle  -EM      Manual Rx 1 Type  PROM: PF,DF, IV, EV  -EM      Manual Rx 1 Duration  8'  -EM        User Key  (r) = Recorded By, (t) = Taken By, (c) = Cosigned By    Initials Name Provider Type    EM Barry Gamboa, PTA Physical Therapy Assistant          PT OP Goals     Row Name 07/23/19 0753          PT Short Term Goals    STG Date to Achieve  06/11/19  -DD     STG 1  Patient will be independent home exercise program  -DD     STG 1 Progress  Met  -DD     STG 2  Patient will have dorsiflexion 7 degrees  -DD     STG 2 Progress  Progressing  -DD     STG 2 Progress Comments  met passive  -DD     STG 3  Patient plantarflexion 35 degrees  -DD     STG 3 Progress  Met  -DD     STG 4  Patient will have eversion 15 degrees  -DD     STG 4 Progress  Ongoing;Not Met  -DD     STG 5  Patient will have inversion 25 degrees  -DD     STG 5 Progress  Progressing  -DD        Long Term Goals    LTG 1  Patient had minimal tenderness to palpation of the posterior calcaneus  -DD     LTG 1 Progress  Progressing  -DD     LTG 2  Patient has  minimal tenderness to palpation of the ATF  -DD     LTG 2 Progress  Met  -DD     LTG 3  Patient to have single leg stance on the left to 6 seconds  -DD     LTG 3 Progress  Ongoing  -DD       User Key  (r) = Recorded By, (t) = Taken By, (c) = Cosigned By    Initials Name Provider Type    Lucero Stahl, PT DPT Physical Therapist        Time Calculation:   Start Time: 0753  Stop Time: 0832  Time Calculation (min): 39 min  Total Timed Code Minutes- PT: 39 minute(s)  Therapy Charges for Today     Code Description Service Date Service Provider Modifiers Qty    83732247487 HC PT THER PROC EA 15 MIN 7/23/2019 Lucero Camilo, PT DPT GP 3          Lucero ORTEGA. JORI CamiloT, ATC  7/23/2019

## 2019-07-24 ENCOUNTER — READMISSION MANAGEMENT (OUTPATIENT)
Dept: CALL CENTER | Facility: HOSPITAL | Age: 59
End: 2019-07-24

## 2019-07-24 NOTE — OUTREACH NOTE
Medical Week 2 Survey      Responses   Facility patient discharged from?  [place]   Does the patient have one of the following disease processes/diagnoses(primary or secondary)?  Other   Week 2 attempt successful?  Yes   Call start time  0920   Discharge diagnosis  Chest pain, CT coronary angiogram   Call end time  0922   Meds reviewed with patient/caregiver?  Yes   Is the patient having any side effects they believe may be caused by any medication additions or changes?  No   Does the patient have all medications ordered at discharge?  Yes   Is the patient taking all medications as directed (includes completed medication regime)?  Yes   Does the patient have a primary care provider?   Yes   Does the patient have an appointment with their PCP within 7 days of discharge?  Yes   Has the patient kept scheduled appointments due by today?  Yes   Has home health visited the patient within 72 hours of discharge?  N/A   Psychosocial issues?  No   Comments  Patient reports she is doing well. No further chest pain.    Did the patient receive a copy of their discharge instructions?  Yes   Nursing interventions  Reviewed instructions with patient   What is the patient's perception of their health status since discharge?  Improving   Is the patient/caregiver able to teach back signs and symptoms related to disease process for when to call PCP?  Yes   Is the patient/caregiver able to teach back signs and symptoms related to disease process for when to call 911?  Yes   Is the patient/caregiver able to teach back the hierarchy of who to call/visit for symptoms/problems? PCP, Specialist, Home health nurse, Urgent Care, ED, 911  Yes   Week 2 Call Completed?  Yes          Anselmo Winn RN

## 2019-07-25 DIAGNOSIS — F41.1 GENERALIZED ANXIETY DISORDER: ICD-10-CM

## 2019-07-25 DIAGNOSIS — M54.41 CHRONIC RIGHT-SIDED LOW BACK PAIN WITH RIGHT-SIDED SCIATICA: Chronic | ICD-10-CM

## 2019-07-25 DIAGNOSIS — G89.29 CHRONIC RIGHT-SIDED LOW BACK PAIN WITH RIGHT-SIDED SCIATICA: Chronic | ICD-10-CM

## 2019-07-25 RX ORDER — HYDROCODONE BITARTRATE AND ACETAMINOPHEN 7.5; 325 MG/1; MG/1
1 TABLET ORAL EVERY 4 HOURS PRN
Qty: 180 TABLET | Refills: 0 | Status: SHIPPED | OUTPATIENT
Start: 2019-07-25 | End: 2019-09-10 | Stop reason: SDUPTHER

## 2019-07-25 RX ORDER — LORAZEPAM 0.5 MG/1
0.5 TABLET ORAL DAILY PRN
Qty: 30 TABLET | Refills: 0 | Status: SHIPPED | OUTPATIENT
Start: 2019-07-25 | End: 2019-09-10 | Stop reason: SDUPTHER

## 2019-07-29 ENCOUNTER — HOSPITAL ENCOUNTER (OUTPATIENT)
Dept: PHYSICAL THERAPY | Facility: HOSPITAL | Age: 59
Setting detail: THERAPIES SERIES
Discharge: HOME | End: 2019-07-29

## 2019-07-29 DIAGNOSIS — S92.355S CLOSED NONDISPLACED FRACTURE OF FIFTH METATARSAL BONE OF LEFT FOOT, SEQUELA: ICD-10-CM

## 2019-07-29 DIAGNOSIS — R26.81 UNSTEADY GAIT: Primary | ICD-10-CM

## 2019-07-29 DIAGNOSIS — Q66.70 PES CAVUS: ICD-10-CM

## 2019-07-29 PROCEDURE — 97110 THERAPEUTIC EXERCISES: CPT

## 2019-07-30 ENCOUNTER — APPOINTMENT (OUTPATIENT)
Dept: PHYSICAL THERAPY | Facility: HOSPITAL | Age: 59
End: 2019-07-30

## 2019-07-31 ENCOUNTER — READMISSION MANAGEMENT (OUTPATIENT)
Dept: CALL CENTER | Facility: HOSPITAL | Age: 59
End: 2019-07-31

## 2019-07-31 NOTE — OUTREACH NOTE
Medical Week 3 Survey      Responses   Facility patient discharged from?  [place]   Does the patient have one of the following disease processes/diagnoses(primary or secondary)?  Other   Week 3 attempt successful?  Yes   Call start time  1855   Rescheduled  Revoked   Revoke  Decline to participate   Call end time  1857   Discharge diagnosis  Chest pain, CT coronary angiogram   Is patient permission given to speak with other caregiver?  Yes   List who call center can speak with     Meds reviewed with patient/caregiver?  Yes   Is the patient having any side effects they believe may be caused by any medication additions or changes?  No   Does the patient have all medications ordered at discharge?  Yes   Is the patient taking all medications as directed (includes completed medication regime)?  Yes   Does the patient have a primary care provider?   Yes   Does the patient have an appointment with their PCP within 7 days of discharge?  Yes   Has the patient kept scheduled appointments due by today?  Yes   Psychosocial issues?  No   Did the patient receive a copy of their discharge instructions?  Yes   Nursing interventions  Reviewed instructions with patient   What is the patient's perception of their health status since discharge?  Improving   Is the patient/caregiver able to teach back signs and symptoms related to disease process for when to call PCP?  Yes   Is the patient/caregiver able to teach back signs and symptoms related to disease process for when to call 911?  Yes   Is the patient/caregiver able to teach back the hierarchy of who to call/visit for symptoms/problems? PCP, Specialist, Home health nurse, Urgent Care, ED, 911  Yes   Week 3 Call Completed?  Yes   Graduated  Yes   Did the patient feel the follow up calls were helpful during their recovery period?  Yes   Was the number of calls appropriate?  Yes   Graduated/Revoked comments  doesn't feel the need for more calls, sister is an RN,           Kimberlee Freed, RN

## 2019-08-01 ENCOUNTER — HOSPITAL ENCOUNTER (OUTPATIENT)
Dept: CT IMAGING | Facility: HOSPITAL | Age: 59
Discharge: HOME | End: 2019-08-01
Admitting: PODIATRIST

## 2019-08-01 DIAGNOSIS — S92.352K DISPLACED FRACTURE OF FIFTH METATARSAL BONE, LEFT FOOT, SUBSEQUENT ENCOUNTER FOR FRACTURE WITH NONUNION: ICD-10-CM

## 2019-08-01 DIAGNOSIS — S92.032P: ICD-10-CM

## 2019-08-01 PROCEDURE — 73700 CT LOWER EXTREMITY W/O DYE: CPT

## 2019-08-06 ENCOUNTER — HOSPITAL ENCOUNTER (OUTPATIENT)
Dept: PHYSICAL THERAPY | Facility: HOSPITAL | Age: 59
Setting detail: THERAPIES SERIES
Discharge: HOME | End: 2019-08-06

## 2019-08-06 DIAGNOSIS — S92.355S CLOSED NONDISPLACED FRACTURE OF FIFTH METATARSAL BONE OF LEFT FOOT, SEQUELA: ICD-10-CM

## 2019-08-06 DIAGNOSIS — Q66.70 PES CAVUS: ICD-10-CM

## 2019-08-06 DIAGNOSIS — R26.81 UNSTEADY GAIT: Primary | ICD-10-CM

## 2019-08-06 NOTE — THERAPY TREATMENT NOTE
Outpatient Physical Therapy Ortho Treatment Note  [facility]     Patient Name: Ariana Martinez  : 1960  MRN: [id number]  Today's Date: 2019      Visit Date: 2019  Attendance:   Subjective improvement:40%  Recert :  MD Appointment: 19         Visit Dx:    ICD-10-CM ICD-9-CM   1. Unsteady gait R26.81 781.2   2. Pes cavus Q66.7 736.73   3. Closed nondisplaced fracture of fifth metatarsal bone of left foot, sequela S92.355S 905.4       Patient Active Problem List   Diagnosis   • 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, with long-term current use of insulin (CMS/[facility])   • Closed nondisplaced fracture of fifth left metatarsal bone   • MAURICIO (generalized anxiety disorder)   • Depression, major, recurrent, moderate (CMS/HCC)   • GERD without esophagitis   • Long term prescription opiate use   • Mixed hyperlipidemia   • Vitamin D deficiency   • Seasonal allergic rhinitis   • Restrictive lung disease secondary to obesity   • Snoring   • Class 3 severe obesity due to excess calories without serious comorbidity with body mass index (BMI) of 40.0 to 44.9 in adult (CMS/HCC)   • (HFpEF) heart failure with preserved ejection fraction (CMS/HCC)   • Diabetic peripheral neuropathy associated with type 2 diabetes mellitus (CMS/HCC)   • Cyanocobalamin deficiency   • CAD (coronary artery disease)   • Hypertension   • Meniere's disease   • Gastroparesis   • Otitis media with effusion, left   • Pulmonary hypertension (CMS/HCC)   • Displaced fracture of fifth metatarsal bone, left foot, subsequent encounter for fracture with nonunion   • Pes cavus   • Primary osteoarthritis involving multiple joints   • Generalized anxiety disorder   • Chronic right-sided low back pain with right-sided sciatica   • Chest pain        Past Medical History:   Diagnosis Date   • Angina, class IV (CMS/HCC)    • Anxiety    • Benign paroxysmal positional vertigo    • Carpal tunnel syndrome     • Chronic pain    • Coronary atherosclerosis     hx CABG 2005.  is followed by Dr Kwon   • Depression    • Diabetes mellitus (CMS/HCC)     Type 2, controlled   • Diabetic polyneuropathy (CMS/HCC)    • Elevated cholesterol    • Female stress incontinence    • Gastroparesis    • GERD (gastroesophageal reflux disease)    • Hyperlipidemia    • Hypertension    • Low back pain    • Malaise and fatigue    • Multiple joint pain    • Obesity     Refuses to be weighed   • Otalgia     Both   • Perforation of tympanic membrane     Left   • Postoperative wound infection    • Vitamin D deficiency         Past Surgical History:   Procedure Laterality Date   • ABDOMINAL SURGERY     • ANGIOPLASTY      coronary   • BREAST BIOPSY Right    • CARDIAC CATHETERIZATION     • CARDIAC CATHETERIZATION N/A 6/20/2017    Procedure: Right Heart Cath;  Surgeon: Can Kwon MD PhD;  Location: [facility] CATH INVASIVE LOCATION;  Service:    • CARPAL TUNNEL RELEASE     • CHOLECYSTECTOMY     • CORONARY ARTERY BYPASS GRAFT  2005   • ENDOSCOPY N/A 10/19/2018    Procedure: ESOPHAGOGASTRODUODENOSCOPY possible dilation;  Surgeon: Julián Maldonado MD;  Location: [facility] ENDOSCOPY;  Service: Gastroenterology   • ENDOSCOPY AND COLONOSCOPY     • FOOT SURGERY      Toes   • GASTRIC BANDING      Revision, laparoscopic   • HYSTERECTOMY     • MOUTH SURGERY     • SALPINGO OOPHORECTOMY     • SHOULDER SURGERY     • SUBTALAR ARTHRODESIS Left 1/16/2019    Procedure: LEFT FOOT HARDWARE REMOVAL, FIFTH METATARSAL , OPEN REDUCTION INTERNAL FIXATION, CALCANEAL OSTEOTOMY;  Surgeon: Ignacio Lord DPM;  Location: [facility] OR;  Service: Podiatry   • TRANSESOPHAGEAL ECHOCARDIOGRAM (LAMONTE)      With color flow       PT Ortho     Row Name 08/06/19 1400       Subjective Comments    Subjective Comments  Pt reports increase in mid foot pain since Sunday. did not do anything to it.  -DD       Subjective Pain    Able to rate subjective pain?  yes  -DD    Pre-Treatment  Pain Level  7  -DD       Posture/Observations    Posture/Observations Comments  ambulates with wt on heel and decreased toe off. antalgic with wt bearing on the left.  -DD       Special Tests/Palpation    Special Tests/Palpation  -- TTP mid longitudinal arch  -DD      User Key  (r) = Recorded By, (t) = Taken By, (c) = Cosigned By    Initials Name Provider Type    Lucero Stahl, PT DPT Physical Therapist            PT Assessment/Plan     Row Name 08/06/19 1422          PT Assessment    Assessment Comments  Pt unable to do wt bearing today, first priority is cast molding for the orthotics. Urged her to see MD earlier if pain persists, Do seated HEP only. rest foot and ice.  -DD     Rehab Potential  Good  -DD     Patient/caregiver participated in establishment of treatment plan and goals  Yes  -DD        PT Plan    Predicted Duration of Therapy Intervention (Therapy Eval)  4 more visits.  -DD     Planned CPT's?  PT EVAL MOD COMPLELITY: 48297;PT THER PROC EA 15 MIN: 00061;PT MANUAL THERAPY EA 15 MIN: 71213  -DD     Physical Therapy Interventions (Optional Details)  ROM (Range of Motion);strengthening;postural re-education;home exercise program;balance training;gait training  -DD     PT Plan Comments  Blood blister improved, small bruise on bottom of the mid right foot. Did not do activities today due to orthotic fabrication mold and increase in pain.  -DD       User Key  (r) = Recorded By, (t) = Taken By, (c) = Cosigned By    Initials Name Provider Type    Lucero Stahl, PT DPT Physical Therapist            Exercises     Row Name 08/06/19 1400             Subjective Comments    Subjective Comments  Pt reports increase in mid foot pain since Sunday. did not do anything to it.  -DD         Subjective Pain    Able to rate subjective pain?  yes  -DD      Pre-Treatment Pain Level  7  -DD        User Key  (r) = Recorded By, (t) = Taken By, (c) = Cosigned By    Initials Name Provider Type    RONALD  Lucero Camilo, PT DPT Physical Therapist              PT OP Goals     Row Name 08/06/19 1425 08/06/19 1345       PT Short Term Goals    STG Date to Achieve  --  06/11/19  -DD    STG 1  --  Patient will be independent home exercise program  -DD    STG 1 Progress  --  Met  -DD    STG 2  --  Patient will have dorsiflexion 7 degrees  -DD    STG 2 Progress  --  Progressing  -DD    STG 3  --  Patient plantarflexion 35 degrees  -DD    STG 3 Progress  --  Met  -DD    STG 4  --  Patient will have eversion 15 degrees  -DD    STG 4 Progress  --  Not Met  -DD    STG 5  --  Patient will have inversion 25 degrees  -DD    STG 5 Progress  --  Progressing  -DD       Long Term Goals    LTG 1  --  Patient had minimal tenderness to palpation of the posterior calcaneus  -DD    LTG 1 Progress  --  Progressing  -DD    LTG 2  --  Patient has minimal tenderness to palpation of the ATF  -DD    LTG 2 Progress  --  Met  -DD    LTG 3  --  Patient to have single leg stance on the left to 6 seconds  -DD    LTG 3 Progress  --  Ongoing  -DD       Time Calculation    PT Goal Re-Cert Due Date  08/13/19  -DD  --      User Key  (r) = Recorded By, (t) = Taken By, (c) = Cosigned By    Initials Name Provider Type    DD Lucero Camilo, PT DPT Physical Therapist          Time Calculation:   Start Time: 1345  Stop Time: 1418  Time Calculation (min): 33 min  Total Timed Code Minutes- PT: 0 minute(s)  Therapy Charges for Today     Code Description Service Date Service Provider Modifiers Qty    [id number]  PT-CUSTOM ORTHOTICS-LEVEL 2 8/6/2019 Lucero Camilo, PT DPT  1          Lucero Camilo PT DPT  8/6/2019

## 2019-08-12 ENCOUNTER — HOSPITAL ENCOUNTER (OUTPATIENT)
Dept: PHYSICAL THERAPY | Facility: HOSPITAL | Age: 59
Setting detail: THERAPIES SERIES
Discharge: HOME | End: 2019-08-12

## 2019-08-12 DIAGNOSIS — R26.81 UNSTEADY GAIT: Primary | ICD-10-CM

## 2019-08-12 DIAGNOSIS — Q66.70 PES CAVUS: ICD-10-CM

## 2019-08-12 DIAGNOSIS — S92.355S CLOSED NONDISPLACED FRACTURE OF FIFTH METATARSAL BONE OF LEFT FOOT, SEQUELA: ICD-10-CM

## 2019-08-12 PROCEDURE — 97110 THERAPEUTIC EXERCISES: CPT

## 2019-08-12 NOTE — THERAPY TREATMENT NOTE
Outpatient Physical Therapy Ortho Treatment Note  [facility]     Patient Name: Ariana Martinez  : 1960  MRN: [id number]  Today's Date: 2019      Visit Date: 2019   Attendance:   Subjective improvement:40%  Recert: 19  MD Appointment: 19-MD note sent with pt      Visit Dx:    ICD-10-CM ICD-9-CM   1. Unsteady gait R26.81 781.2   2. Pes cavus Q66.7 736.73   3. Closed nondisplaced fracture of fifth metatarsal bone of left foot, sequela S92.355S 905.4       Patient Active Problem List   Diagnosis   • 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, with long-term current use of insulin (CMS/HCC)   • Closed nondisplaced fracture of fifth left metatarsal bone   • MAURICIO (generalized anxiety disorder)   • Depression, major, recurrent, moderate (CMS/HCC)   • GERD without esophagitis   • Long term prescription opiate use   • Mixed hyperlipidemia   • Vitamin D deficiency   • Seasonal allergic rhinitis   • Restrictive lung disease secondary to obesity   • Snoring   • Class 3 severe obesity due to excess calories without serious comorbidity with body mass index (BMI) of 40.0 to 44.9 in adult (CMS/HCC)   • (HFpEF) heart failure with preserved ejection fraction (CMS/HCC)   • Diabetic peripheral neuropathy associated with type 2 diabetes mellitus (CMS/HCC)   • Cyanocobalamin deficiency   • CAD (coronary artery disease)   • Hypertension   • Meniere's disease   • Gastroparesis   • Otitis media with effusion, left   • Pulmonary hypertension (CMS/HCC)   • Displaced fracture of fifth metatarsal bone, left foot, subsequent encounter for fracture with nonunion   • Pes cavus   • Primary osteoarthritis involving multiple joints   • Generalized anxiety disorder   • Chronic right-sided low back pain with right-sided sciatica   • Chest pain        Past Medical History:   Diagnosis Date   • Angina, class IV (CMS/HCC)    • Anxiety    • Benign paroxysmal positional vertigo     • Carpal tunnel syndrome    • Chronic pain    • Coronary atherosclerosis     hx CABG 2005.  is followed by Dr Kwon   • Depression    • Diabetes mellitus (CMS/HCC)     Type 2, controlled   • Diabetic polyneuropathy (CMS/HCC)    • Elevated cholesterol    • Female stress incontinence    • Gastroparesis    • GERD (gastroesophageal reflux disease)    • Hyperlipidemia    • Hypertension    • Low back pain    • Malaise and fatigue    • Multiple joint pain    • Obesity     Refuses to be weighed   • Otalgia     Both   • Perforation of tympanic membrane     Left   • Postoperative wound infection    • Vitamin D deficiency         Past Surgical History:   Procedure Laterality Date   • ABDOMINAL SURGERY     • ANGIOPLASTY      coronary   • BREAST BIOPSY Right    • CARDIAC CATHETERIZATION     • CARDIAC CATHETERIZATION N/A 6/20/2017    Procedure: Right Heart Cath;  Surgeon: Can Kwon MD PhD;  Location: [facility] CATH INVASIVE LOCATION;  Service:    • CARPAL TUNNEL RELEASE     • CHOLECYSTECTOMY     • CORONARY ARTERY BYPASS GRAFT  2005   • ENDOSCOPY N/A 10/19/2018    Procedure: ESOPHAGOGASTRODUODENOSCOPY possible dilation;  Surgeon: Julián Maldonado MD;  Location: [facility] ENDOSCOPY;  Service: Gastroenterology   • ENDOSCOPY AND COLONOSCOPY     • FOOT SURGERY      Toes   • GASTRIC BANDING      Revision, laparoscopic   • HYSTERECTOMY     • MOUTH SURGERY     • SALPINGO OOPHORECTOMY     • SHOULDER SURGERY     • SUBTALAR ARTHRODESIS Left 1/16/2019    Procedure: LEFT FOOT HARDWARE REMOVAL, FIFTH METATARSAL , OPEN REDUCTION INTERNAL FIXATION, CALCANEAL OSTEOTOMY;  Surgeon: Ignacio Lord DPM;  Location: [facility] OR;  Service: Podiatry   • TRANSESOPHAGEAL ECHOCARDIOGRAM (LAMONTE)      With color flow       PT Ortho     Row Name 08/12/19 0800       Posture/Observations    Posture/Observations Comments  ambulates with wt on heel and decreased toe off. antalgic with wt bearing on the left.  -EM      User Key  (r) = Recorded By, (t)  "= Taken By, (c) = Cosigned By    Initials Name Provider Type    Barry Bhatt PTA Physical Therapy Assistant                      PT Assessment/Plan     Row Name 08/12/19 0900          PT Assessment    Assessment Comments  Pt becki tx well with improved TTP of L heel, however, mod TTP remains at lateral L foot. Pt currently progressing slowly with ROM, strength, and balance activities. Awaiting orthotic fabrication.   -EM        PT Plan    Predicted Duration of Therapy Intervention (Therapy Eval)  3 visits  -EM     PT Plan Comments  Cont per MD recommendation  -EM       User Key  (r) = Recorded By, (t) = Taken By, (c) = Cosigned By    Initials Name Provider Type    Barry Bhatt PTA Physical Therapy Assistant            Exercises     Row Name 08/12/19 0800             Subjective Comments    Subjective Comments  \"My foot feels like it is turning more out(supinating)\" Pt states she had recently had more pain with it.   -EM         Subjective Pain    Able to rate subjective pain?  yes  -EM      Pre-Treatment Pain Level  5  -EM      Post-Treatment Pain Level  5  -EM         Exercise 1    Exercise Name 1  PRO II-[place]-4.0  -EM      Time 1  10'  -EM         Exercise 2    Exercise Name 2  Incline Calf S  -EM      Sets 2  3  -EM      Time 2  30\"  -EM         Exercise 3    Exercise Name 3  St CR: Neutral, IR, ER  -EM      Sets 3  1  -EM      Reps 3  30  -EM         Exercise 4    Exercise Name 4  St TR  -EM      Sets 4  1  -EM      Reps 4  30  -EM         Exercise 5    Exercise Name 5  PROM  -EM      Time 5  6'  -EM         Exercise 6    Exercise Name 6  ROM measurements  -EM        User Key  (r) = Recorded By, (t) = Taken By, (c) = Cosigned By    Initials Name Provider Type    Barry Bhatt PTA Physical Therapy Assistant                      Manual Rx (last 36 hours)      Manual Treatments     Row Name 08/12/19 0900             Manual Rx 1    Manual Rx 1 Location  L ankle  -EM      Manual Rx 1 Type  PROM: " PF,DF, IV, EV  -EM      Manual Rx 1 Duration  6'  -EM        User Key  (r) = Recorded By, (t) = Taken By, (c) = Cosigned By    Initials Name Provider Type    EM Barry Gamboa PTA Physical Therapy Assistant          PT OP Goals     Row Name 08/12/19 0900          PT Short Term Goals    STG Date to Achieve  06/11/19  -EM     STG 1  Patient will be independent home exercise program  -EM     STG 1 Progress  Met  (Significant)   -EM     STG 2  Patient will have dorsiflexion 7 degrees  -EM     STG 2 Progress  Progressing  -EM     STG 3  Patient plantarflexion 35 degrees  -EM     STG 3 Progress  Met  (Significant)   -EM     STG 4  Patient will have eversion 15 degrees  -EM     STG 4 Progress  Not Met  -EM     STG 5  Patient will have inversion 25 degrees  -EM     STG 5 Progress  Progressing  -EM        Long Term Goals    LTG 1  Patient had minimal tenderness to palpation of the posterior calcaneus  -EM     LTG 1 Progress  Met  (Significant)   -EM     LTG 2  Patient has minimal tenderness to palpation of the ATF  -EM     LTG 2 Progress  Met  (Significant)   -EM     LTG 3  Patient to have single leg stance on the left to 6 seconds  -EM     LTG 3 Progress  Ongoing  -EM        Time Calculation    PT Goal Re-Cert Due Date  08/13/19  -EM       User Key  (r) = Recorded By, (t) = Taken By, (c) = Cosigned By    Initials Name Provider Type     Barry Gamboa PTA Physical Therapy Assistant                         Time Calculation:   Start Time: 0849  Stop Time: 0935  Time Calculation (min): 46 min  Total Timed Code Minutes- PT: 46 minute(s)  Therapy Charges for Today     Code Description Service Date Service Provider Modifiers Qty    [id number]  PT THER PROC EA 15 MIN 8/12/2019 Barry Gamboa PTA GP 3                    Barry Gamboa PTA  8/12/2019

## 2019-08-13 ENCOUNTER — OFFICE VISIT (OUTPATIENT)
Dept: PODIATRY | Facility: CLINIC | Age: 59
End: 2019-08-13

## 2019-08-13 VITALS — WEIGHT: 262 LBS | OXYGEN SATURATION: 98 % | HEIGHT: 68 IN | HEART RATE: 92 BPM | BODY MASS INDEX: 39.71 KG/M2

## 2019-08-13 DIAGNOSIS — T85.848A PAIN FROM IMPLANTED HARDWARE, INITIAL ENCOUNTER: ICD-10-CM

## 2019-08-13 DIAGNOSIS — Q66.70 PES CAVUS: Primary | ICD-10-CM

## 2019-08-13 DIAGNOSIS — S92.352K DISPLACED FRACTURE OF FIFTH METATARSAL BONE, LEFT FOOT, SUBSEQUENT ENCOUNTER FOR FRACTURE WITH NONUNION: ICD-10-CM

## 2019-08-13 PROCEDURE — 99214 OFFICE O/P EST MOD 30 MIN: CPT | Performed by: PODIATRIST

## 2019-08-13 NOTE — PROGRESS NOTES
Ariana Luis Martinez  1960  59 y.o. female   WILMAR Fong - 06/07/19  BS - 145 this morning per patient     Patient presents today for a follow up on the left foot. 8/13/19 08/13/2019        Chief Complaint   Patient presents with   • Left Foot - Follow-up       History of Present Illness    Ms Martinez is a 58 y/o diabetic female who presents for f/u left foot revision fifth metatarsal fracture nonunion and Rich calcaneal osteotomy.  Date of surgery 1/16/2019.   In her postoperative course she did fall on postoperative week 1 causing an avulsion fracture of her calcaneal osteotomy.  We treated this conservatively and monitoring with serial x-rays.  She has returned to regular shoes full-time.  She has been in physical therapy since last visit.  She does continue to note pain and instability with prolonged walking.  Has had CT scan since last visit.    Past Medical History:   Diagnosis Date   • Angina, class IV (CMS/HCC)    • Anxiety    • Benign paroxysmal positional vertigo    • Carpal tunnel syndrome    • Chronic pain    • Coronary atherosclerosis     hx CABG 2005.  is followed by Dr Kwon   • Depression    • Diabetes mellitus (CMS/HCC)     Type 2, controlled   • Diabetic polyneuropathy (CMS/HCC)    • Elevated cholesterol    • Female stress incontinence    • Gastroparesis    • GERD (gastroesophageal reflux disease)    • Hyperlipidemia    • Hypertension    • Low back pain    • Malaise and fatigue    • Multiple joint pain    • Obesity     Refuses to be weighed   • Otalgia     Both   • Perforation of tympanic membrane     Left   • Postoperative wound infection    • Vitamin D deficiency          Past Surgical History:   Procedure Laterality Date   • ABDOMINAL SURGERY     • ANGIOPLASTY      coronary   • BREAST BIOPSY Right    • CARDIAC CATHETERIZATION     • CARDIAC CATHETERIZATION N/A 6/20/2017    Procedure: Right Heart Cath;  Surgeon: Can Kwon MD PhD;  Location: [facility] INVASIVE LOCATION;   Service:    • CARPAL TUNNEL RELEASE     • CHOLECYSTECTOMY     • CORONARY ARTERY BYPASS GRAFT  2005   • ENDOSCOPY N/A 10/19/2018    Procedure: ESOPHAGOGASTRODUODENOSCOPY possible dilation;  Surgeon: Julián Maldonado MD;  Location: [facility] ENDOSCOPY;  Service: Gastroenterology   • ENDOSCOPY AND COLONOSCOPY     • FOOT SURGERY      Toes   • GASTRIC BANDING      Revision, laparoscopic   • HYSTERECTOMY     • MOUTH SURGERY     • SALPINGO OOPHORECTOMY     • SHOULDER SURGERY     • SUBTALAR ARTHRODESIS Left 1/16/2019    Procedure: LEFT FOOT HARDWARE REMOVAL, FIFTH METATARSAL , OPEN REDUCTION INTERNAL FIXATION, CALCANEAL OSTEOTOMY;  Surgeon: Ignacio Lord DPM;  Location: [facility] OR;  Service: Podiatry   • TRANSESOPHAGEAL ECHOCARDIOGRAM (LAMONTE)      With color flow         Family History   Problem Relation Age of Onset   • Diabetes Other    • Heart disease Other    • Hypertension Other    • Heart disease Mother    • Stroke Mother    • Hypertension Mother    • Diabetes Sister    • Heart disease Sister    • Hypertension Sister    • Heart disease Sister    • Diabetes Sister    • Hypertension Sister    • Diabetes Sister    • Diabetes Sister    • Diabetes Sister    • Diabetes Sister          Social History     Socioeconomic History   • Marital status:      Spouse name: Not on file   • Number of children: Not on file   • Years of education: Not on file   • Highest education level: Not on file   Tobacco Use   • Smoking status: Never Smoker   • Smokeless tobacco: Never Used   Substance and Sexual Activity   • Alcohol use: No   • Drug use: No   • Sexual activity: Defer         Current Outpatient Medications   Medication Sig Dispense Refill   • ARIPiprazole (ABILIFY) 15 MG tablet Take 1 tablet by mouth Daily. 90 tablet 1   • aspirin 81 MG chewable tablet Chew 81 mg daily.     • atorvastatin (LIPITOR) 40 MG tablet Take 1 tablet by mouth Every Night. 90 tablet 1   • BD SHARPS CONTAINER HOME misc 1 each Take As Directed. 1 each 0    • Blood Glucose Monitoring Suppl (ONE TOUCH ULTRA MINI) w/Device kit USE AS DIRECTED TO CHECK BLOOD SUGAR 1 each 0   • Calcium Citrate-Vitamin D (CITRACAL/VITAMIN D) 250-200 MG-UNIT tablet Take 2 tablets by mouth 2 (two) times a day.     • clopidogrel (PLAVIX) 75 MG tablet Take 75 mg by mouth Daily.     • cyanocobalamin 1000 MCG/ML injection Inject 1 mL into the appropriate muscle as directed by prescriber Every 28 (Twenty-Eight) Days. 1 mL 0   • furosemide (LASIX) 40 MG tablet Take 1 tablet by mouth Daily. 90 tablet 1   • gabapentin (NEURONTIN) 400 MG capsule TAKE 1 CAPSULE BY MOUTH THREE TIMES A DAY 90 capsule 2   • GLUCAGON EMERGENCY 1 MG injection USE AS DIRECTED AS NEEDED 1 kit 0   • glucose blood (ONE TOUCH ULTRA TEST) test strip 1 each by Other route 4 (Four) Times a Day. Use as instructed 400 each 1   • hydrochlorothiazide (MICROZIDE) 12.5 MG capsule TAKE 1 CAPSULE BY MOUTH DAILY. 90 capsule 0   • HYDROcodone-acetaminophen (NORCO) 7.5-325 MG per tablet Take 1 tablet by mouth Every 4 (Four) Hours As Needed for Moderate Pain  or Severe Pain . 180 tablet 0   • insulin aspart (novoLOG FLEXPEN) 100 UNIT/ML solution pen-injector sc pen Inject 60 Units under the skin into the appropriate area as directed 3 (Three) Times a Day With Meals.     • insulin aspart (NOVOLOG FLEXPEN) 100 UNIT/ML solution pen-injector sc pen INJECT 60 UNITS BEFORE MEALS 3 TIMES A DAY 5 pen 5   • Insulin Glargine (BASAGLAR KWIKPEN) 100 UNIT/ML injection pen Inject 100 Units under the skin into the appropriate area as directed Every Night. 5 pen 5   • Insulin Pen Needle (B-D ULTRAFINE III SHORT PEN) 31G X 8 MM misc USE TO INJECT 4 TIMES A  each 11   • isosorbide mononitrate (IMDUR) 30 MG 24 hr tablet Take 1 tablet by mouth Daily. 30 tablet 1   • LORazepam (ATIVAN) 0.5 MG tablet Take 1 tablet by mouth Daily As Needed for Anxiety. 30 tablet 0   • meclizine (ANTIVERT) 25 MG tablet Take 1 tablet by mouth 3 (Three) Times a Day As Needed  "for dizziness. 90 tablet 6   • meloxicam (MOBIC) 15 MG tablet Take 1 tablet by mouth Daily. Take once daily. 30 tablet 0   • metoclopramide (REGLAN) 10 MG tablet Take 10 mg by mouth 4 (Four) Times a Day As Needed.     • metoprolol succinate XL (TOPROL-XL) 25 MG 24 hr tablet TAKE 1 TABLET BY MOUTH DAILY. 31 tablet 6   • mirtazapine (REMERON) 45 MG tablet TAKE 1 TABLET BY MOUTH EVERY NIGHT. 90 tablet 0   • omeprazole (PRILOSEC) 40 MG capsule Take 1 capsule by mouth Daily. 90 capsule 1   • ONE TOUCH ULTRA TEST test strip USE TO TEST SUGAR 4 TIMES A  each 3   • phenazopyridine (PYRIDIUM) 200 MG tablet Take 1 tablet by mouth 3 (Three) Times a Day As Needed for bladder spasms. 21 tablet 0   • ranolazine (RANEXA) 500 MG 12 hr tablet Take 1 tablet by mouth Every 12 (Twelve) Hours. 60 tablet 1   • Syringe/Needle, Disp, (LUER LOCK SAFETY SYRINGES) 25G X 5/8\" 3 ML misc 1 each Daily. 100 each 0   • venlafaxine XR (EFFEXOR-XR) 150 MG 24 hr capsule Take 1 capsule by mouth 2 (Two) Times a Day. 60 capsule 2   • vitamin D (ERGOCALCIFEROL) 79509 units capsule capsule Take 50,000 Units by mouth 1 (One) Time Per Week. sunday       No current facility-administered medications for this visit.      Review of Systems   Constitutional: Negative.    HENT: Negative.    Eyes: Negative.    Respiratory: Negative.    Cardiovascular: Negative.    Gastrointestinal: Negative.    Endocrine: Negative.    Genitourinary: Negative.    Musculoskeletal: Negative.         Left foot pain    Skin: Negative.    Allergic/Immunologic: Negative.    Neurological: Positive for numbness.   Hematological: Negative.    Psychiatric/Behavioral: Negative.          OBJECTIVE    Pulse 92   Ht 172.7 cm (68\")   Wt 119 kg (262 lb)   SpO2 98%   BMI 39.84 kg/m²         Physical Exam   Constitutional: she appears well-developed and well-nourished.   Psychiatric: she has a normal mood and affect. her   behavior is normal.      Lower Extremity Exam:  Vascular: DP pulses " palpable 2+. PT not readily palpable. +signal on doppler  Negative hair growth.   Mild left foot edema  Negative Conrad  Neuro: Protective sensation absent to foot, b/l. Reestablished at mid calf   DTRs intact  Vibratory sensation diminished.  Integument: Left foot incisions well healed.  No signs of infection  No skin tenting is noted to the left heel  Right lateral malleolus ulceration healed  Diffuse xerosis b/l.  Webspaces c/d/i  Musculoskeletal: Left ankle range of motion intact  Mild Tenderness to palpation of left lateral 5th metatarsal.    Mild tenderness to lateral calcaneal body  Achilles tendon intact.     CT left foot without contrast on 8/1/2019     CLINICAL INDICATION: Displaced fracture of fifth metatarsal bone,  subsequent encounter for nonunion, closed displaced avulsion  fracture of tuberosity of left calcaneus with malunion     TECHNIQUE: Multiple axial images are obtained throughout the left  foot without the administration of contrast. Sagittal and coronal  reformatted images are also performed and reviewed. This exam was  performed according to our departmental dose-optimization  program, which includes automated exposure control, adjustment of  the mA and/or kV according to patient size and/or use of  iterative reconstruction technique.   Total DLP is 173.6 mGy*cm.     COMPARISON: Left foot x-ray from 7/23/2019 and prior CT from  11/27/2018     FINDINGS: There has been interval placement of two screws in the  posterior calcaneus extending into the mid calcaneus. There has  been interval fracture of the posterior calcaneus with some  partial nonunion of the upper posterior calcaneus along its  medial superior aspect. There remains some lucency across the  lateral portion of the superior posterior calcaneus but there is  at least partial union of this with only small area of nonunion  involving the most medial aspect. The previously noted screw in  the fifth metatarsal has been removed since the  prior CT. There  is now lateral plate and multiple screw fixation from the base of  the fifth metatarsal to the mid diaphysis of the fifth  metatarsal. There now appears to be good fusion across the prior  fracture of the proximal fifth metatarsal. No evidence of  residual nonunion is noted. There is mild diffuse osteopenia.  Mild degenerative changes are noted in the midfoot. There are no  acute fracture lines. Visualized joints are well aligned. No  other bony or soft tissue abnormality is noted.     IMPRESSION:  1. There is now union across a prior area of nonunion in the  fifth metatarsal.  2. There is very small area of nonunion involving the medial  upper posterior calcaneus as above.     Electronically signed by:  Anthony Dexter  8/2/2019 12:40 AM  CDT Workstation: [id number]    Procedures        ASSESSMENT AND PLAN    Ariana was seen today for follow-up.    Diagnoses and all orders for this visit:    Pes cavus  -     Case Request  -     ceFAZolin (ANCEF) 2 g in sodium chloride 0.9 % 100 mL IVPB    Displaced fracture of fifth metatarsal bone, left foot, subsequent encounter for fracture with nonunion  -     ceFAZolin (ANCEF) 2 g in sodium chloride 0.9 % 100 mL IVPB    Pain from implanted hardware, initial encounter  -     Case Request  -     ceFAZolin (ANCEF) 2 g in sodium chloride 0.9 % 100 mL IVPB    Other orders  -     Follow Anesthesia Guidelines / Standing Orders; Future  -     Obtain Informed Consent; Future  -     Follow Anesthesia Guidelines / Standing Orders; Standing  -     Provide Instructions to Patient Regarding NPO Status; Future  -     Verify NPO Status; Standing  -     Obtain Informed Consent (If Not Done Inpatient or PAT); Standing  -     Radiology Technician to Be Present for Intra-Op C-Arm Use; Standing        -CT scan reviewed with patient.  Patient has reached a significant plateau in pain and balance despite evidence of union across prior fifth metatarsal fracture site.  -Furthermore  patient feels that inversion of her hindfoot seems to be worsening.  At this point I did recommend removal of all hardware and subtalar joint fusion and effort to improve alignment of hindfoot.  Due to prior avulsion fracture of calcaneus this may require calcaneal exostectomy and reattachment of the Achilles tendon as well.  Discussed all risks, benefits and potential complications including postoperative course and need for nonweightbearing.  Patient understands would like to proceed.  -We will plan for 8/28  -Recheck 2 weeks          This document has been electronically signed by Ignacio Lord DPM on August 14, 2019 6:34 PM

## 2019-08-14 ENCOUNTER — APPOINTMENT (OUTPATIENT)
Dept: PHYSICAL THERAPY | Facility: HOSPITAL | Age: 59
End: 2019-08-14

## 2019-08-14 PROBLEM — T85.848A PAIN FROM IMPLANTED HARDWARE: Status: ACTIVE | Noted: 2019-08-14

## 2019-08-14 RX ORDER — BUPIVACAINE HCL/0.9 % NACL/PF 0.1 %
2 PLASTIC BAG, INJECTION (ML) EPIDURAL ONCE
Status: CANCELLED | OUTPATIENT
Start: 2019-10-16 | End: 2019-08-14

## 2019-08-21 ENCOUNTER — OFFICE VISIT (OUTPATIENT)
Dept: CARDIOLOGY | Facility: CLINIC | Age: 59
End: 2019-08-21

## 2019-08-21 VITALS
OXYGEN SATURATION: 98 % | HEART RATE: 81 BPM | HEIGHT: 68 IN | BODY MASS INDEX: 39.28 KG/M2 | DIASTOLIC BLOOD PRESSURE: 70 MMHG | WEIGHT: 259.2 LBS | SYSTOLIC BLOOD PRESSURE: 126 MMHG

## 2019-08-21 DIAGNOSIS — I27.20 PULMONARY HYPERTENSION (HCC): Primary | ICD-10-CM

## 2019-08-21 DIAGNOSIS — E66.01 CLASS 3 SEVERE OBESITY DUE TO EXCESS CALORIES WITHOUT SERIOUS COMORBIDITY WITH BODY MASS INDEX (BMI) OF 40.0 TO 44.9 IN ADULT (HCC): ICD-10-CM

## 2019-08-21 DIAGNOSIS — I50.30 (HFPEF) HEART FAILURE WITH PRESERVED EJECTION FRACTION (HCC): ICD-10-CM

## 2019-08-21 DIAGNOSIS — I25.10 CORONARY ARTERY DISEASE INVOLVING NATIVE CORONARY ARTERY OF NATIVE HEART WITHOUT ANGINA PECTORIS: ICD-10-CM

## 2019-08-21 PROCEDURE — 99214 OFFICE O/P EST MOD 30 MIN: CPT | Performed by: INTERNAL MEDICINE

## 2019-08-21 RX ORDER — ATORVASTATIN CALCIUM 40 MG/1
TABLET, FILM COATED ORAL
Qty: 90 TABLET | Refills: 3 | Status: SHIPPED | OUTPATIENT
Start: 2019-08-21 | End: 2019-08-22

## 2019-08-21 NOTE — PROGRESS NOTES
Cardiovascular Medicine   Can Kwon M.D., Ph.D., [facility]      The patient returns to cardiovascular clinic for follow-up of the following:    PROBLEM LIST:  1. MV ASCAD  A. CABG, 2005  -LIMA-LAD: Prior PCI 2.5 x 28mm in 2004  -RCA, 60%  B. [facility], 6/2016  -pLAD: 50%  -Ostial D1-30%  -mLAD: 100%  -dLAD fills via LIMA  -OM1: 60%  -FFR was 0.93  2. HTN  3. HLD  4. DM2  5. Mild MR/TR  6. LE pain  7. PAH    Ariana Martinez is a 59 y.o. female who returns to clinic today in follow-up.  She does have a history of heart failure with preserved ejection fraction and pulmonary venous hypertension.  Concerning this, she has stable dyspnea.  She is noncompliant much of the time with her diet.  She is currently prescribed Toprol-XL and furosemide.  She's had emergency department visits or inpatient admissions for acute decompensated heart failure.  Weight is stable.  She denies any dizziness, lightheadedness or syncope.  She's had no PND or orthopnea.  Concerning her tricuspid regurgitation, it has remained mild. .  Concerning her coronary artery disease she is status post LIMA to the LAD by CABG in 2005.  She is currently prescribed aspirin, Plavix, Lipitor and Toprol-XL. She is no longer using Ranexa and Imdur. She was admitted recently and had a coronary CTA with patent LIMA. She had a myocardial perfusion scintigraphy in May 2018 showed no inducible ischemia.  Concerning her hyperlipidemia she remains on a statin therapy.  She is medication compliant.  Denies side effects.      The following portions of the patient's history were reviewed and updated as appropriate: allergies, current medications, past family history, past medical history, past social history, past surgical history and problem list.      Review of Systems   Cardiovascular: Positive for dyspnea on exertion. Negative for chest pain, claudication, cyanosis, irregular heartbeat, leg swelling, near-syncope, orthopnea, palpitations, paroxysmal nocturnal  dyspnea and syncope.   Respiratory: Positive for shortness of breath. Negative for cough, sputum production and wheezing.      family history includes Diabetes in her other, sister, sister, sister, sister, sister, and sister; Heart disease in her mother, other, sister, and sister; Hypertension in her mother, other, sister, and sister; Stroke in her mother.   reports that she has never smoked. She has never used smokeless tobacco. She reports that she does not drink alcohol or use drugs.  Allergies   Allergen Reactions   • Seroquel [Quetiapine Fumarate] Anaphylaxis   • Seroquel [Quetiapine] Anaphylaxis   • Avandia [Rosiglitazone] Swelling   • Morphine And Related Hallucinations   • Oxycodone Hallucinations       Current Outpatient Medications:   •  ARIPiprazole (ABILIFY) 15 MG tablet, Take 1 tablet by mouth Daily., Disp: 90 tablet, Rfl: 1  •  aspirin 81 MG chewable tablet, Chew 81 mg daily., Disp: , Rfl:   •  atorvastatin (LIPITOR) 40 MG tablet, TAKE 1 TABLET BY MOUTH EVERY DAY AT NIGHT, Disp: 90 tablet, Rfl: 3  •  BD SHARPS CONTAINER HOME misc, 1 each Take As Directed., Disp: 1 each, Rfl: 0  •  Blood Glucose Monitoring Suppl (ONE TOUCH ULTRA MINI) w/Device kit, USE AS DIRECTED TO CHECK BLOOD SUGAR, Disp: 1 each, Rfl: 0  •  Calcium Citrate-Vitamin D (CITRACAL/VITAMIN D) 250-200 MG-UNIT tablet, Take 2 tablets by mouth 2 (two) times a day., Disp: , Rfl:   •  cefprozil (CEFZIL) 500 MG tablet, Take 1 tablet by mouth 2 (Two) Times a Day for 10 days., Disp: 20 tablet, Rfl: 0  •  clopidogrel (PLAVIX) 75 MG tablet, Take 75 mg by mouth Daily., Disp: , Rfl:   •  cyanocobalamin 1000 MCG/ML injection, Inject 1 mL into the appropriate muscle as directed by prescriber Every 28 (Twenty-Eight) Days., Disp: 1 mL, Rfl: 0  •  fluticasone (FLONASE) 50 MCG/ACT nasal spray, 2 sprays into the nostril(s) as directed by provider Daily for 30 days., Disp: 1 bottle, Rfl: 0  •  furosemide (LASIX) 40 MG tablet, Take 1 tablet by mouth Daily.,  Disp: 90 tablet, Rfl: 1  •  gabapentin (NEURONTIN) 400 MG capsule, TAKE 1 CAPSULE BY MOUTH THREE TIMES A DAY, Disp: 90 capsule, Rfl: 2  •  GLUCAGON EMERGENCY 1 MG injection, USE AS DIRECTED AS NEEDED, Disp: 1 kit, Rfl: 0  •  glucose blood (ONE TOUCH ULTRA TEST) test strip, 1 each by Other route 4 (Four) Times a Day. Use as instructed, Disp: 400 each, Rfl: 1  •  hydrochlorothiazide (MICROZIDE) 12.5 MG capsule, TAKE 1 CAPSULE BY MOUTH DAILY., Disp: 90 capsule, Rfl: 0  •  HYDROcodone-acetaminophen (NORCO) 7.5-325 MG per tablet, Take 1 tablet by mouth Every 4 (Four) Hours As Needed for Moderate Pain  or Severe Pain ., Disp: 180 tablet, Rfl: 0  •  insulin aspart (novoLOG FLEXPEN) 100 UNIT/ML solution pen-injector sc pen, Inject 60 Units under the skin into the appropriate area as directed 3 (Three) Times a Day With Meals., Disp: , Rfl:   •  insulin aspart (NOVOLOG FLEXPEN) 100 UNIT/ML solution pen-injector sc pen, INJECT 60 UNITS BEFORE MEALS 3 TIMES A DAY, Disp: 5 pen, Rfl: 5  •  Insulin Glargine (BASAGLAR KWIKPEN) 100 UNIT/ML injection pen, Inject 100 Units under the skin into the appropriate area as directed Every Night., Disp: 5 pen, Rfl: 5  •  Insulin Pen Needle (B-D ULTRAFINE III SHORT PEN) 31G X 8 MM misc, USE TO INJECT 4 TIMES A DAY, Disp: 120 each, Rfl: 11  •  isosorbide mononitrate (IMDUR) 30 MG 24 hr tablet, Take 1 tablet by mouth Daily., Disp: 30 tablet, Rfl: 1  •  LORazepam (ATIVAN) 0.5 MG tablet, Take 1 tablet by mouth Daily As Needed for Anxiety., Disp: 30 tablet, Rfl: 0  •  meclizine (ANTIVERT) 25 MG tablet, Take 1 tablet by mouth 3 (Three) Times a Day As Needed for dizziness., Disp: 90 tablet, Rfl: 6  •  metoclopramide (REGLAN) 10 MG tablet, Take 10 mg by mouth 4 (Four) Times a Day As Needed., Disp: , Rfl:   •  metoprolol succinate XL (TOPROL-XL) 25 MG 24 hr tablet, TAKE 1 TABLET BY MOUTH DAILY., Disp: 31 tablet, Rfl: 6  •  mirtazapine (REMERON) 45 MG tablet, TAKE 1 TABLET BY MOUTH EVERY NIGHT., Disp: 90  "tablet, Rfl: 0  •  omeprazole (PRILOSEC) 40 MG capsule, Take 1 capsule by mouth Daily., Disp: 90 capsule, Rfl: 1  •  ONE TOUCH ULTRA TEST test strip, USE TO TEST SUGAR 4 TIMES A DAY, Disp: 100 each, Rfl: 3  •  ranolazine (RANEXA) 500 MG 12 hr tablet, Take 1 tablet by mouth Every 12 (Twelve) Hours., Disp: 60 tablet, Rfl: 1  •  Syringe/Needle, Disp, (LUER LOCK SAFETY SYRINGES) 25G X 5/8\" 3 ML misc, 1 each Daily., Disp: 100 each, Rfl: 0  •  venlafaxine XR (EFFEXOR-XR) 150 MG 24 hr capsule, Take 1 capsule by mouth 2 (Two) Times a Day., Disp: 60 capsule, Rfl: 2  •  vitamin D (ERGOCALCIFEROL) 15388 units capsule capsule, Take 50,000 Units by mouth 1 (One) Time Per Week. sunday, Disp: , Rfl:     Physical Exam:  Vitals:    08/21/19 1309   BP: 126/70   Pulse: 81   SpO2: 98%     Body mass index is 39.41 kg/m².   Last Weights:   Wt Readings from Last 3 Encounters:   08/19/19 119 kg (261 lb 6 oz)   08/13/19 119 kg (262 lb)   07/23/19 119 kg (262 lb)     Pulse Ox: Normal   General: alert, appears stated age and cooperative  Body Habitus: obese  HEENT: Head: Normocephalic, no lesions, without obvious abnormality. No arcus senilis, xanthelasma or xanthomas.  JVP: 6 cm of water at 45 degrees   Volume/Pulsation: Normal  Heart rate: normal    Heart Rhythm: regular     Heart Sounds: S1: normal intensity  S2: normal intensity  S3: absent   S4: absent  Opening Snap: absent  A2-OS:  absent.   Pericardial rub: absent    Ejection click: None      Murmurs:  absent   Extremity: moves all extremities equally.       DATA REVIEWED:       TTE/LAMONTE:  Results for orders placed during the hospital encounter of 07/12/19   Adult Transthoracic Echo Complete W/ Cont if Necessary Per Protocol    Narrative · Left ventricular wall thickness is consistent with mild concentric   hypertrophy.  · Estimated EF = 62%.  · Left ventricular systolic function is normal.  · Left ventricular diastolic dysfunction (grade I a) consistent with   impaired relaxation.  · " Left atrial cavity size is moderately dilated.  · There is calcification of the aortic valve mainly affecting the non   coronary cusp(s).  · Tricuspid valve is grossly normal. Trace to mild tricuspid valve   regurgitation is present. Estimated right ventricular systolic pressure   from tricuspid regurgitation is mildly elevated (35-45 mmHg). Mild   pulmonary hypertension is present.          Lab Results   Component Value Date    GLUCOSE 181 (H) 07/13/2019    BUN 13 07/13/2019    CREATININE 0.97 07/13/2019    EGFRIFNONA 59 (L) 07/13/2019    BCR 13.4 07/13/2019    K 3.9 07/13/2019    CO2 27.0 07/13/2019    CALCIUM 8.9 07/13/2019    ALBUMIN 3.40 (L) 07/13/2019    AST 14 07/13/2019    ALT 16 07/13/2019     Lab Results   Component Value Date    WBC 8.25 07/13/2019    HGB 12.6 07/13/2019    HCT 38.8 07/13/2019    MCV 85.5 07/13/2019     07/13/2019     Lab Results   Component Value Date    CHOL 172 07/13/2019    CHLPL 211 (H) 01/19/2017    TRIG 214 (H) 07/13/2019    HDL 37 (L) 07/13/2019    LDL 92 07/13/2019     Lab Results   Component Value Date    TSH 8.990 (H) 07/12/2019    U7WULXS 9.70 08/30/2017    THYROIDAB <6 01/02/2015     Lab Results   Component Value Date    CKTOTAL 38 02/24/2018    CKMB 1.00 02/24/2018    TROPONINI <0.012 07/22/2018    TROPONINT <0.010 07/13/2019     Lab Results   Component Value Date    HGBA1C 8.80 (H) 07/12/2019     Lab Results   Component Value Date    DDIMER 722 (H) 11/11/2016     Lab Results   Component Value Date    ALT 16 07/13/2019     Lab Results   Component Value Date    HGBA1C 8.80 (H) 07/12/2019    HGBA1C 8.20 (H) 06/07/2019    HGBA1C 8.1 (H) 11/28/2018     Lab Results   Component Value Date    MICROALBUR 2.6 02/06/2019    CREATININE 0.97 07/13/2019     Lab Results   Component Value Date    IRON 20 (L) 10/19/2017     Lab Results   Component Value Date    INR 1.02 07/21/2018    INR 0.99 04/30/2018    INR 0.95 10/14/2017    PROTIME 13.2 07/21/2018    PROTIME 12.9 04/30/2018     PROTIME 12.6 10/14/2017       Assessment/Plan     1. Pulmonary hypertension (CMS/HCC). PAH/PVH. WHO Group 2; FC: II.  RV status: adapted.  Patient appears euvolemic. Perfusion status: good.    -No current indication for PAH-specific medications  -No compelling indication at this time for RHC  -Will continue active clinical surveillance.  Signs and symptoms of worsening PAH and RV failure were discussed.  -I have asked the patient that if they were to move to be certain they seen someone with expertise in PAH. These providers can be located at [email or web address].  -[id number]    2. (HFpEF) heart failure with preserved ejection fraction (CMS/HCC). NYHA stage C; FC-II.  Patient appears euvolemic. Perfusion status: good. There is not evidence of decompensation.   -Continue current HFpEF medications:  -Low sodium diet  -Weigh daily; Call for concerning weight gain or concerning symptoms    3.  Tricuspid regurgitation. ACC stage B.  There are no surgical indications at this time. The patient has not had valve surgery..   · The patient has been advised to remain in clinical surveillance every 12 months.  · Signs and symptoms of worsening valve disease discussed.  I've asked the patient contact me for an earlier appointment if these develop.  · I've recommended a repeat 2D TTE every 3 years.   · TTE due: 2022.  No indication based on 2017 ACC/AHA guidelines for IE prophylaxis for dental procedures: Optimal oral health is recommended through regular professional dental care and the use of appropriate dental products, such as manual, powered, and ultrasonic toothbrushes; dental floss; and other plaque-removal devices    3. Coronary artery disease involving native coronary artery of native heart without angina pectoris. No anginal symptoms. The patient has been revascularized.  Revascularization:  CABG   -ASA  -Clopidogrel  -Imdur  -Atorvastatin  -Call 911 immediately for concerning symptoms.    4. Cardiac Risk  "Assessments:    -Continue follow-up visits with PCP for monitoring of labs; Dietary changes: Increase soluble fiber: A printed copy of a low fat, low cholesterol diet was given; Reduce saturated fat, \"trans\" monounsaturated fatty acids, and cholesterol; Exercise changes:  Encouraged daily aerobic activity.    -Essential HTN is a significant risk factor for stroke, heart disease and vascular disease. I've recommended the patient continue current medications, if any, as prescribed by the primary care provider. I recommended they have close follow-up for ongoing mgmt of this and the medical comorbidities associated with HTN with their PCP.    The patient's BMI is Body mass index is 39.41 kg/m²..  This places the patient in weight class:  Obese Class II: 35-39.9kg/m2.   -Weight loss hand-out  -Exercise intervention:   Hand out, increase aerobic activity  cut out extra servings, decrease soda or juice intake, eat breakfast, eat more fruits and vegetables, family to eat at dinner table more often, have 3 meals a day, increase physical activity, increase water intake, keep TV off during meals, plan meals, reduce fast food intake, reduce portion size and reduce screen time  -Close PCP follow-up for Body mass index is 39.41 kg/m²..     Nicotine status: has never smoked      Return in about 1 year (around 8/21/2020).          "

## 2019-08-21 NOTE — PATIENT INSTRUCTIONS
"Low-Sodium Eating Plan  Sodium, which is an element that makes up salt, helps you maintain a healthy balance of fluids in your body. Too much sodium can increase your blood pressure and cause fluid and waste to be held in your body.  Your health care provider or dietitian may recommend following this plan if you have high blood pressure (hypertension), kidney disease, liver disease, or heart failure. Eating less sodium can help lower your blood pressure, reduce swelling, and protect your heart, liver, and kidneys.  What are tips for following this plan?  General guidelines  Most people on this plan should limit their sodium intake to 1,500-2,000 mg (milligrams) of sodium each day.  Reading food labels    The Nutrition Facts label lists the amount of sodium in one serving of the food. If you eat more than one serving, you must multiply the listed amount of sodium by the number of servings.  Choose foods with less than 140 mg of sodium per serving.  Avoid foods with 300 mg of sodium or more per serving.  Shopping  Look for lower-sodium products, often labeled as \"low-sodium\" or \"no salt added.\"  Always check the sodium content even if foods are labeled as \"unsalted\" or \"no salt added\".  Buy fresh foods.  Avoid canned foods and premade or frozen meals.  Avoid canned, cured, or processed meats  Buy breads that have less than 80 mg of sodium per slice.  Cooking  Eat more home-cooked food and less restaurant, buffet, and fast food.  Avoid adding salt when cooking. Use salt-free seasonings or herbs instead of table salt or sea salt. Check with your health care provider or pharmacist before using salt substitutes.  Cook with plant-based oils, such as canola, sunflower, or olive oil.  Meal planning  When eating at a restaurant, ask that your food be prepared with less salt or no salt, if possible.  Avoid foods that contain MSG (monosodium glutamate). MSG is sometimes added to Chinese food, bouillon, and some canned " "foods.  What foods are recommended?  The items listed may not be a complete list. Talk with your dietitian about what dietary choices are best for you.  Grains  Low-sodium cereals, including oats, puffed wheat and rice, and shredded wheat. Low-sodium crackers. Unsalted rice. Unsalted pasta. Low-sodium bread. Whole-grain breads and whole-grain pasta.  Vegetables  Fresh or frozen vegetables. \"No salt added\" canned vegetables. \"No salt added\" tomato sauce and paste. Low-sodium or reduced-sodium tomato and vegetable juice.  Fruits  Fresh, frozen, or canned fruit. Fruit juice.  Meats and other protein foods  Fresh or frozen (no salt added) meat, poultry, seafood, and fish. Low-sodium canned tuna and salmon. Unsalted nuts. Dried peas, beans, and lentils without added salt. Unsalted canned beans. Eggs. Unsalted nut butters.  Dairy  Milk. Soy milk. Cheese that is naturally low in sodium, such as ricotta cheese, fresh mozzarella, or Swiss cheese Low-sodium or reduced-sodium cheese. Cream cheese. Yogurt.  Fats and oils  Unsalted butter. Unsalted margarine with no trans fat. Vegetable oils such as canola or olive oils.  Seasonings and other foods  Fresh and dried herbs and spices. Salt-free seasonings. Low-sodium mustard and ketchup. Sodium-free salad dressing. Sodium-free light mayonnaise. Fresh or refrigerated horseradish. Lemon juice. Vinegar. Homemade, reduced-sodium, or low-sodium soups. Unsalted popcorn and pretzels. Low-salt or salt-free chips.  What foods are not recommended?  The items listed may not be a complete list. Talk with your dietitian about what dietary choices are best for you.  Grains  Instant hot cereals. Bread stuffing, pancake, and biscuit mixes. Croutons. Seasoned rice or pasta mixes. Noodle soup cups. Boxed or frozen macaroni and cheese. Regular salted crackers. Self-rising flour.  Vegetables  Sauerkraut, pickled vegetables, and relishes. Olives. French fries. Onion rings. Regular canned vegetables " (not low-sodium or reduced-sodium). Regular canned tomato sauce and paste (not low-sodium or reduced-sodium). Regular tomato and vegetable juice (not low-sodium or reduced-sodium). Frozen vegetables in sauces.  Meats and other protein foods  Meat or fish that is salted, canned, smoked, spiced, or pickled. Rodriguez, ham, sausage, hotdogs, corned beef, chipped beef, packaged lunch meats, salt pork, jerky, pickled herring, anchovies, regular canned tuna, sardines, salted nuts.  Dairy  Processed cheese and cheese spreads. Cheese curds. Blue cheese. Feta cheese. String cheese. Regular cottage cheese. Buttermilk. Canned milk.  Fats and oils  Salted butter. Regular margarine. Ghee. Rodriguez fat.  Seasonings and other foods  Onion salt, garlic salt, seasoned salt, table salt, and sea salt. Canned and packaged gravies. Worcestershire sauce. Tartar sauce. Barbecue sauce. Teriyaki sauce. Soy sauce, including reduced-sodium. Steak sauce. Fish sauce. Oyster sauce. Cocktail sauce. Horseradish that you find on the shelf. Regular ketchup and mustard. Meat flavorings and tenderizers. Bouillon cubes. Hot sauce and Tabasco sauce. Premade or packaged marinades. Premade or packaged taco seasonings. Relishes. Regular salad dressings. Salsa. Potato and tortilla chips. Corn chips and puffs. Salted popcorn and pretzels. Canned or dried soups. Pizza. Frozen entrees and pot pies.  Summary  Eating less sodium can help lower your blood pressure, reduce swelling, and protect your heart, liver, and kidneys.  Most people on this plan should limit their sodium intake to 1,500-2,000 mg (milligrams) of sodium each day.  Canned, boxed, and frozen foods are high in sodium. Restaurant foods, fast foods, and pizza are also very high in sodium. You also get sodium by adding salt to food.  Try to cook at home, eat more fresh fruits and vegetables, and eat less fast food, canned, processed, or prepared foods.  This information is not intended to replace advice  given to you by your health care provider. Make sure you discuss any questions you have with your health care provider.  Document Released: 06/09/2003 Document Revised: 12/11/2017 Document Reviewed: 12/11/2017  [employer] Interactive Patient Education © 2019 [employer]  Preventing Unhealthy Weight Gain, Adult  Staying at a healthy weight is important to your overall health. When fat builds up in your body, you may become overweight or obese. Being overweight or obese increases your risk of developing certain health problems, such as heart disease, diabetes, sleeping problems, joint problems, and some types of cancer.  Unhealthy weight gain is often the result of making unhealthy food choices or not getting enough exercise. You can make changes to your lifestyle to prevent obesity and stay as healthy as possible.  What nutrition changes can be made?    · Eat only as much as your body needs. To do this:  ? Pay attention to signs that you are hungry or full. Stop eating as soon as you feel full.  ? If you feel hungry, try drinking water first before eating. Drink enough water so your urine is clear or pale yellow.  ? Eat smaller portions. Pay attention to portion sizes when eating out.  ? Look at serving sizes on food labels. Most foods contain more than one serving per container.  ? Eat the recommended number of calories for your gender and activity level. For most active people, a daily total of 2,000 calories is appropriate. If you are trying to lose weight or are not very active, you may need to eat fewer calories. Talk with your health care provider or a diet and nutrition specialist (dietitian) about how many calories you need each day.  · Choose healthy foods, such as:  ? Fruits and vegetables. At each meal, try to fill at least half of your plate with fruits and vegetables.  ? Whole grains, such as whole-wheat bread, brown rice, and quinoa.  ? Lean meats, such as chicken or fish.  ? Other healthy proteins,  such as beans, eggs, or tofu.  ? Healthy fats, such as nuts, seeds, fatty fish, and olive oil.  ? Low-fat or fat-free dairy products.  · Check food labels, and avoid food and drinks that:  ? Are high in calories.  ? Have added sugar.  ? Are high in sodium.  ? Have saturated fats or trans fats.  · Cook foods in healthier ways, such as by baking, broiling, or grilling.  · Make a meal plan for the week, and shop with a grocery list to help you stay on track with your purchases. Try to avoid going to the grocery store when you are hungry.  · When grocery shopping, try to shop around the outside of the store first, where the fresh foods are. Doing this helps you to avoid prepackaged foods, which can be high in sugar, salt (sodium), and fat.  What lifestyle changes can be made?    · Exercise for 30 or more minutes on 5 or more days each week. Exercising may include brisk walking, yard work, biking, running, swimming, and team sports like basketball and soccer. Ask your health care provider which exercises are safe for you.  · Do muscle-strengthening activities, such as lifting weights or using resistance bands, on 2 or more days a week.  · Do not use any products that contain nicotine or tobacco, such as cigarettes and e-cigarettes. If you need help quitting, ask your health care provider.  · Limit alcohol intake to no more than 1 drink a day for nonpregnant women and 2 drinks a day for men. One drink equals 12 oz of beer, 5 oz of wine, or 1½ oz of hard liquor.  · Try to get 7-9 hours of sleep each night.  What other changes can be made?  · Keep a food and activity journal to keep track of:  ? What you ate and how many calories you had. Remember to count the calories in sauces, dressings, and side dishes.  ? Whether you were active, and what exercises you did.  ? Your calorie, weight, and activity goals.  · Check your weight regularly. Track any changes. If you notice you have gained weight, make changes to your diet or  activity routine.  · Avoid taking weight-loss medicines or supplements. Talk to your health care provider before starting any new medicine or supplement.  · Talk to your health care provider before trying any new diet or exercise plan.  Why are these changes important?  Eating healthy, staying active, and having healthy habits can help you to prevent obesity. Those changes also:  · Help you manage stress and emotions.  · Help you connect with friends and family.  · Improve your self-esteem.  · Improve your sleep.  · Prevent long-term health problems.  What can happen if changes are not made?  Being obese or overweight can cause you to develop joint or bone problems, which can make it hard for you to stay active or do activities you enjoy. Being obese or overweight also puts stress on your heart and lungs and can lead to health problems like diabetes, heart disease, and some cancers.  Where to find more information  Talk with your health care provider or a dietitian about healthy eating and healthy lifestyle choices. You may also find information from:  · [place] Department of [employer], [employer]: www.[employer].gov  · American Heart Association: www.heart.org  · Centers for Disease Control and Prevention: www.cdc.gov  Summary  · Staying at a healthy weight is important to your overall health. It helps you to prevent certain diseases and health problems, such as heart disease, diabetes, joint problems, sleep disorders, and some types of cancer.  · Being obese or overweight can cause you to develop joint or bone problems, which can make it hard for you to stay active or do activities you enjoy.  · You can prevent unhealthy weight gain by eating a healthy diet, exercising regularly, not smoking, limiting alcohol, and getting enough sleep.  · Talk with your health care provider or a dietitian for guidance about healthy eating and healthy lifestyle choices.  This information is not intended to replace advice given to  you by your health care provider. Make sure you discuss any questions you have with your health care provider.  Document Released: 12/19/2017 Document Revised: 09/28/2018 Document Reviewed: 01/24/2018  [employer] Interactive Patient Education © 2019 [employer]  Pulmonary Hypertension  Pulmonary hypertension is a long-term (chronic) condition in which there is high blood pressure in the arteries in the lungs (pulmonary arteries). This condition occurs when pulmonary arteries become narrow and tight, making it harder for blood to flow through the lungs. This in turn makes the heart work harder to pump blood through the lungs, making it harder for you to breathe.  Over time, pulmonary hypertension can weaken and damage the heart muscle, specifically the right side of the heart. Pulmonary hypertension is a serious condition that can be life-threatening.  What are the causes?  This condition may be caused by different medical conditions. It can be categorized by cause into five groups:  · Group 1: Pulmonary hypertension that is caused by abnormal growth of small blood vessels in the lungs (pulmonary arterial hypertension). The abnormal blood vessel growth may have no known cause, or it may be:  ? Passed from parent to child (hereditary).  ? Caused by another disease, such as a connective tissue disease (including lupus or scleroderma), congenital heart disease, liver disease, or HIV.  ? Caused by certain medicines or poisons (toxins).  · Group 2: Pulmonary hypertension that is caused by weakness of the left chamber of the heart (left ventricle) or heart valve disease.  · Group 3: Pulmonary hypertension that is caused by lung disease or low oxygen levels. Causes in this group include:  ? Emphysema or chronic obstructive pulmonary disease (COPD).  ? Untreated sleep apnea.  ? Pulmonary fibrosis.  ? Long-term exposure to high altitudes in certain people who may already be at higher risk for pulmonary hypertension.  · Group  4: Pulmonary hypertension that is caused by blood clots in the lungs (pulmonary emboli).  · Group 5: Other causes of pulmonary hypertension, such as sickle cell anemia, sarcoidosis, tumors pressing on the pulmonary arteries, and various other diseases.  What are the signs or symptoms?  Symptoms of this condition include:  · Shortness of breath. You may notice shortness of breath with:  ? Activity, such as walking.  ? Minimal activity, such as getting dressed.  ? No activity, like when you are sitting still.  · A cough. Sometimes, bloody mucus from the lungs may be coughed up (hemoptysis).  · Tiredness and fatigue.  · Dizziness, lightheadedness, or fainting, especially with physical activity.  · Rapid heartbeat, or feeling your heart flutter or skip a beat (palpitations).  · Veins in the neck getting larger.  · Swelling of the lower legs, abdomen, or both.  · Bluish color of the lips and fingertips.  · Chest pain or tightness in the chest.  · Abdominal pain, especially in the upper abdomen.  How is this diagnosed?  This condition may be diagnosed based on one or more of the following tests:  · Chest X-ray.  · Blood tests.  · CT scan.  · Pulmonary function test. This test measures how much air your lungs can hold. It also tests how well air moves in and out of your lungs.  · 6-minute walk test. This tests how severe your condition is in relation to your activity levels.  · Electrocardiogram (ECG). This test records the electrical impulses of the heart.  · Echocardiogram. This test uses sound waves (ultrasound) to produce an image of the heart.  · Cardiac catheterization. This is a procedure in which a thin tube (catheter) is passed into the pulmonary artery and used to test the pressure in your pulmonary artery and the right side of your heart.  · Lung biopsy. This involves having a procedure to remove a small sample of lung tissue for testing. This may help determine an underlying cause of your pulmonary  hypertension.  How is this treated?  There is no cure for this condition, but treatment can help to relieve symptoms and slow the progress of the condition. Treatment may include:  · Cardiac rehabilitation. This is a treatment program that includes exercise training, education, and counseling to help you get stronger and return to an active lifestyle.  · Oxygen therapy.  · Medicines that:  ? Lower blood pressure.  ? Relax (dilate) the pulmonary blood vessels.  ? Help the heart beat more efficiently and pump more blood.  ? Help the body get rid of extra fluid (diuretics).  ? Thin the blood in order to prevent blood clots in the lungs.  · Lung surgery to relieve pressure on the heart, for severe cases that do not respond to medical treatment.  · Heart-lung transplant, or lung transplant. This may be done in very severe cases.  Follow these instructions at home:  Eating and drinking    · Eat a healthy diet that includes plenty of fresh fruits and vegetables, whole grains, and beans.  · Limit your salt (sodium) intake to less than 2,300 mg a day.  Lifestyle  · Do not use any products that contain nicotine or tobacco, such as cigarettes and e-cigarettes. If you need help quitting, ask your health care provider.  · Avoid secondhand smoke.  Activity  · Get plenty of rest.  · Exercise as directed. Talk with your health care provider about what type of exercise is safe for you.  · Avoid hot tubs and saunas.  · Avoid high altitudes.  General instructions  · Take over-the-counter and prescription medicines only as told by your health care provider. Do not change or stop medicines without checking with your health care provider.  · Stay up to date on your vaccines, especially yearly flu (influenza) and pneumonia vaccines.  · If you are a woman of child-bearing age, avoid becoming pregnant. Talk with your health care provider about birth control.  · Consider ways to get support for anxiety and stress of living with pulmonary  hypertension. Talk with your health care provider about support groups and online resources.  · Use oxygen therapy at home as directed.  · Keep track of your weight. Weight gain could be a sign that your condition is getting worse.  · Keep all follow-up visits as told by your health care provider. This is important.  Contact a health care provider if:  · Your cough gets worse.  · You have more shortness of breath than usual, or you start to have trouble doing activities that you could do before.  · You need to use medicines or oxygen more frequently or in higher dosages than usual.  Get help right away if:  · You have severe shortness of breath.  · You have chest pain or pressure.  · You cough up blood.  · You have swelling of your feet or legs that gets worse.  · You have rapid weight gain over a period of 1-2 days.  · Your medicines or oxygen do not provide relief.  Summary  · Pulmonary hypertension is a chronic condition in which there is high blood pressure in the arteries in the lungs (pulmonary arteries).  · Pulmonary hypertension is a serious condition that can be life-threatening. It can be caused by a variety of illnesses.  · Treatment may involve taking medicines and using oxygen therapy. Severe cases may require surgery or a transplant.  This information is not intended to replace advice given to you by your health care provider. Make sure you discuss any questions you have with your health care provider.  Document Released: 10/14/2008 Document Revised: 03/13/2018 Document Reviewed: 03/13/2018  [employer] Interactive Patient Education © 2019 [employer]

## 2019-08-22 ENCOUNTER — APPOINTMENT (OUTPATIENT)
Dept: PREADMISSION TESTING | Facility: HOSPITAL | Age: 59
End: 2019-08-22

## 2019-08-22 VITALS
DIASTOLIC BLOOD PRESSURE: 78 MMHG | HEART RATE: 78 BPM | HEIGHT: 68 IN | RESPIRATION RATE: 18 BRPM | OXYGEN SATURATION: 97 % | SYSTOLIC BLOOD PRESSURE: 130 MMHG | BODY MASS INDEX: 39.4 KG/M2 | WEIGHT: 260 LBS

## 2019-08-22 LAB
ANION GAP SERPL CALCULATED.3IONS-SCNC: 13 MMOL/L (ref 5–15)
BUN BLD-MCNC: 14 MG/DL (ref 6–20)
BUN/CREAT SERPL: 14.9 (ref 7–25)
CALCIUM SPEC-SCNC: 9.7 MG/DL (ref 8.6–10.5)
CHLORIDE SERPL-SCNC: 100 MMOL/L (ref 98–107)
CO2 SERPL-SCNC: 27 MMOL/L (ref 22–29)
CREAT BLD-MCNC: 0.94 MG/DL (ref 0.57–1)
GFR SERPL CREATININE-BSD FRML MDRD: 61 ML/MIN/1.73
GLUCOSE BLD-MCNC: 151 MG/DL (ref 65–99)
POTASSIUM BLD-SCNC: 3.9 MMOL/L (ref 3.5–5.2)
SODIUM BLD-SCNC: 140 MMOL/L (ref 136–145)

## 2019-08-22 PROCEDURE — 80048 BASIC METABOLIC PNL TOTAL CA: CPT | Performed by: ANESTHESIOLOGY

## 2019-08-22 PROCEDURE — 36415 COLL VENOUS BLD VENIPUNCTURE: CPT

## 2019-08-22 RX ORDER — ATORVASTATIN CALCIUM 40 MG/1
40 TABLET, FILM COATED ORAL NIGHTLY
COMMUNITY
End: 2019-12-26 | Stop reason: DRUGHIGH

## 2019-08-22 RX ORDER — HYDROCHLOROTHIAZIDE 12.5 MG/1
12.5 CAPSULE, GELATIN COATED ORAL DAILY
COMMUNITY
End: 2019-09-03

## 2019-08-22 RX ORDER — GABAPENTIN 400 MG/1
400 CAPSULE ORAL 3 TIMES DAILY
COMMUNITY
End: 2019-12-03 | Stop reason: SDUPTHER

## 2019-08-22 RX ORDER — SODIUM CHLORIDE 9 MG/ML
1000 INJECTION, SOLUTION INTRAVENOUS CONTINUOUS
Status: CANCELLED | OUTPATIENT
Start: 2019-08-28

## 2019-08-22 NOTE — DISCHARGE INSTRUCTIONS
[facility]  Pre-op Information and Guidelines    You will be called after 2 p.m. the day before your surgery (Friday for Monday surgery) and notified of your time for arrival and approximate surgery time.  If you have not received a call by 4P.M., please contact Same Day Surgery at [phone] of if outside [facility] call [phone].    Please Follow these Important Safety Guidelines:    • The morning of your procedure, take only the medications listed below with   A sip of water:_____________________________________________       ______________________________________________    • DO NOT eat or drink anything after 12:00 midnight the night before surgery  Specific instructions concerning drinking clear liquids will be discussed during  the pre-surgery instruction call the day before your surgery.    • If you take a blood thinner (ex. Plavix, Coumadin, aspirin), ask your doctor when to stop it before surgery  STOP DATE: _________________    • Only 2 visitors are allowed in patient rooms at a time  Your visitors will be asked to wait in the lobby until the admission process is complete with the exception of a parent with a child and patients in need of special assistance.    • YOU CANNOT DRIVE YOURSELF HOME  You must be accompanied by someone who will be responsible for driving you home after surgery and for your care at home.    • DO NOT chew gum, use breath mints, hard candy, or smoke the day of surgery  • DO NOT drink alcohol for at least 24 hours before your surgery  • DO NOT wear any jewelry and remove all body piercing before coming to the hospital  • DO NOT wear make-up to the hospital  • If you are having surgery on an extremity (arm/leg/foot) remove nail polish/artificial nails on the surgical side  • Clothing, glasses, contacts, dentures, and hairpieces must be removed before surgery  • Bathe the night before or the morning of your surgery and do not use powders/lotions on  skin.

## 2019-08-22 NOTE — PAT
Chlorhexidine scrub given with instruction sheet.  Instruction reviewed in PAT, understanding verbalized.    Pt instructed to bring remote for bladder stimulator, understanding verbalized.    Dr Lord notified for instruction on stopping blood thinner.  Stated pt should stop plavix 8-23 and should continue ASA.  Pt instructed accordingly, understanding verbalized.

## 2019-08-27 ENCOUNTER — TELEPHONE (OUTPATIENT)
Dept: PODIATRY | Facility: CLINIC | Age: 59
End: 2019-08-27

## 2019-08-27 NOTE — TELEPHONE ENCOUNTER
JOHN:    PATIENT IS SCHEDULED FOR SURGERY TOMORROW AND NEEDS TO CANCEL AND RESCHEDULE DO TO HAVING A UPPER RESP. INFECTION.  SAMANTHA IN SURGERY CALLED AND THEY HAVE CANCELED PROCEDURE BUT IT WILL NEED TO BE RESCHEDULED.

## 2019-08-27 NOTE — TELEPHONE ENCOUNTER
Let patient know to give us a call when she is better- and she would have to be off antibiotics for 10 days before we can operate on her.

## 2019-08-29 RX ORDER — VENLAFAXINE HYDROCHLORIDE 150 MG/1
150 CAPSULE, EXTENDED RELEASE ORAL 2 TIMES DAILY
Qty: 60 CAPSULE | Refills: 2 | OUTPATIENT
Start: 2019-08-29

## 2019-08-29 RX ORDER — VENLAFAXINE 100 MG/1
100 TABLET ORAL 3 TIMES DAILY
Qty: 90 TABLET | Refills: 5 | Status: SHIPPED | OUTPATIENT
Start: 2019-08-29 | End: 2019-10-07

## 2019-09-03 RX ORDER — HYDROCHLOROTHIAZIDE 12.5 MG/1
CAPSULE, GELATIN COATED ORAL
Qty: 90 CAPSULE | Refills: 0 | Status: SHIPPED | OUTPATIENT
Start: 2019-09-03 | End: 2019-10-07

## 2019-09-05 ENCOUNTER — TELEPHONE (OUTPATIENT)
Dept: PODIATRY | Facility: CLINIC | Age: 59
End: 2019-09-05

## 2019-09-05 NOTE — TELEPHONE ENCOUNTER
JOHN:    CHIQUITA CALLED AND STATED SHE IS TAKING ANTIBIOTICS AND SHE WOULD LIKE TO GET BACK ON THE BOOKS FOR SURGERY.  PLEASE CALL HER TO SCHEDULE [phone]    PT CALLED AND HAS AN ALT PH NUMBER [phone]

## 2019-09-05 NOTE — TELEPHONE ENCOUNTER
Spoke with patient and let her know that we can do her surgery on October 16 and her pre-op is October 7 @ 9:00.

## 2019-09-06 ENCOUNTER — APPOINTMENT (OUTPATIENT)
Dept: LAB | Facility: HOSPITAL | Age: 59
End: 2019-09-06

## 2019-09-06 LAB
25(OH)D3 SERPL-MCNC: 24 NG/ML (ref 30–100)
ALBUMIN SERPL-MCNC: 4.4 G/DL (ref 3.5–5.2)
ALBUMIN/GLOB SERPL: 1.2 G/DL
ALP SERPL-CCNC: 142 U/L (ref 39–117)
ALT SERPL W P-5'-P-CCNC: 12 U/L (ref 1–33)
ANION GAP SERPL CALCULATED.3IONS-SCNC: 15.9 MMOL/L (ref 5–15)
AST SERPL-CCNC: 11 U/L (ref 1–32)
BASOPHILS # BLD AUTO: 0.07 10*3/MM3 (ref 0–0.2)
BASOPHILS NFR BLD AUTO: 0.6 % (ref 0–1.5)
BILIRUB SERPL-MCNC: 0.4 MG/DL (ref 0.2–1.2)
BUN BLD-MCNC: 17 MG/DL (ref 6–20)
BUN/CREAT SERPL: 15.2 (ref 7–25)
CALCIUM SPEC-SCNC: 10.3 MG/DL (ref 8.6–10.5)
CHLORIDE SERPL-SCNC: 93 MMOL/L (ref 98–107)
CO2 SERPL-SCNC: 25.1 MMOL/L (ref 22–29)
CREAT BLD-MCNC: 1.12 MG/DL (ref 0.57–1)
DEPRECATED RDW RBC AUTO: 43 FL (ref 37–54)
EOSINOPHIL # BLD AUTO: 0.59 10*3/MM3 (ref 0–0.4)
EOSINOPHIL NFR BLD AUTO: 4.7 % (ref 0.3–6.2)
ERYTHROCYTE [DISTWIDTH] IN BLOOD BY AUTOMATED COUNT: 14.2 % (ref 12.3–15.4)
GFR SERPL CREATININE-BSD FRML MDRD: 50 ML/MIN/1.73
GLOBULIN UR ELPH-MCNC: 3.8 GM/DL
GLUCOSE BLD-MCNC: 227 MG/DL (ref 65–99)
HBA1C MFR BLD: 8.49 % (ref 4.8–5.6)
HCT VFR BLD AUTO: 42.6 % (ref 34–46.6)
HGB BLD-MCNC: 14 G/DL (ref 12–15.9)
IMM GRANULOCYTES # BLD AUTO: 0.05 10*3/MM3 (ref 0–0.05)
IMM GRANULOCYTES NFR BLD AUTO: 0.4 % (ref 0–0.5)
LYMPHOCYTES # BLD AUTO: 2.03 10*3/MM3 (ref 0.7–3.1)
LYMPHOCYTES NFR BLD AUTO: 16.3 % (ref 19.6–45.3)
MCH RBC QN AUTO: 27.6 PG (ref 26.6–33)
MCHC RBC AUTO-ENTMCNC: 32.9 G/DL (ref 31.5–35.7)
MCV RBC AUTO: 83.9 FL (ref 79–97)
MONOCYTES # BLD AUTO: 0.84 10*3/MM3 (ref 0.1–0.9)
MONOCYTES NFR BLD AUTO: 6.8 % (ref 5–12)
NEUTROPHILS # BLD AUTO: 8.86 10*3/MM3 (ref 1.7–7)
NEUTROPHILS NFR BLD AUTO: 71.2 % (ref 42.7–76)
NRBC BLD AUTO-RTO: 0 /100 WBC (ref 0–0.2)
PLATELET # BLD AUTO: 526 10*3/MM3 (ref 140–450)
PMV BLD AUTO: 10.2 FL (ref 6–12)
POTASSIUM BLD-SCNC: 3.6 MMOL/L (ref 3.5–5.2)
PROT SERPL-MCNC: 8.2 G/DL (ref 6–8.5)
RBC # BLD AUTO: 5.08 10*6/MM3 (ref 3.77–5.28)
SODIUM BLD-SCNC: 134 MMOL/L (ref 136–145)
TSH SERPL DL<=0.05 MIU/L-ACNC: 3.99 UIU/ML (ref 0.27–4.2)
VIT B12 BLD-MCNC: 757 PG/ML (ref 211–946)
WBC NRBC COR # BLD: 12.44 10*3/MM3 (ref 3.4–10.8)

## 2019-09-06 PROCEDURE — 85025 COMPLETE CBC W/AUTO DIFF WBC: CPT | Performed by: NURSE PRACTITIONER

## 2019-09-06 PROCEDURE — 36415 COLL VENOUS BLD VENIPUNCTURE: CPT | Performed by: NURSE PRACTITIONER

## 2019-09-06 PROCEDURE — 84443 ASSAY THYROID STIM HORMONE: CPT | Performed by: NURSE PRACTITIONER

## 2019-09-06 PROCEDURE — 82306 VITAMIN D 25 HYDROXY: CPT | Performed by: NURSE PRACTITIONER

## 2019-09-06 PROCEDURE — 80053 COMPREHEN METABOLIC PANEL: CPT | Performed by: NURSE PRACTITIONER

## 2019-09-06 PROCEDURE — 83036 HEMOGLOBIN GLYCOSYLATED A1C: CPT | Performed by: NURSE PRACTITIONER

## 2019-09-06 PROCEDURE — 82607 VITAMIN B-12: CPT | Performed by: NURSE PRACTITIONER

## 2019-09-10 ENCOUNTER — OFFICE VISIT (OUTPATIENT)
Dept: FAMILY MEDICINE CLINIC | Facility: CLINIC | Age: 59
End: 2019-09-10

## 2019-09-10 VITALS
TEMPERATURE: 98.4 F | HEIGHT: 68 IN | SYSTOLIC BLOOD PRESSURE: 120 MMHG | RESPIRATION RATE: 16 BRPM | DIASTOLIC BLOOD PRESSURE: 70 MMHG | WEIGHT: 257.6 LBS | HEART RATE: 67 BPM | BODY MASS INDEX: 39.04 KG/M2 | OXYGEN SATURATION: 99 %

## 2019-09-10 DIAGNOSIS — E53.8 CYANOCOBALAMIN DEFICIENCY: ICD-10-CM

## 2019-09-10 DIAGNOSIS — J06.9 ACUTE URI: ICD-10-CM

## 2019-09-10 DIAGNOSIS — I10 ESSENTIAL HYPERTENSION: ICD-10-CM

## 2019-09-10 DIAGNOSIS — E53.8 B12 DEFICIENCY: Chronic | ICD-10-CM

## 2019-09-10 DIAGNOSIS — Z79.891 LONG TERM CURRENT USE OF OPIATE ANALGESIC: Chronic | ICD-10-CM

## 2019-09-10 DIAGNOSIS — M54.41 CHRONIC RIGHT-SIDED LOW BACK PAIN WITH RIGHT-SIDED SCIATICA: Chronic | ICD-10-CM

## 2019-09-10 DIAGNOSIS — E55.9 VITAMIN D DEFICIENCY: Chronic | ICD-10-CM

## 2019-09-10 DIAGNOSIS — G89.29 CHRONIC RIGHT-SIDED LOW BACK PAIN WITH RIGHT-SIDED SCIATICA: Chronic | ICD-10-CM

## 2019-09-10 DIAGNOSIS — I25.10 CORONARY ARTERY DISEASE INVOLVING NATIVE CORONARY ARTERY OF NATIVE HEART WITHOUT ANGINA PECTORIS: Chronic | ICD-10-CM

## 2019-09-10 DIAGNOSIS — F41.1 GENERALIZED ANXIETY DISORDER: Primary | Chronic | ICD-10-CM

## 2019-09-10 DIAGNOSIS — F41.1 GENERALIZED ANXIETY DISORDER: ICD-10-CM

## 2019-09-10 DIAGNOSIS — Z12.39 SCREENING FOR BREAST CANCER: ICD-10-CM

## 2019-09-10 PROCEDURE — 99214 OFFICE O/P EST MOD 30 MIN: CPT | Performed by: NURSE PRACTITIONER

## 2019-09-10 RX ORDER — CYANOCOBALAMIN 1000 UG/ML
1000 INJECTION, SOLUTION INTRAMUSCULAR; SUBCUTANEOUS
Qty: 1 ML | Refills: 0 | Status: SHIPPED | OUTPATIENT
Start: 2019-09-10 | End: 2019-11-04 | Stop reason: SDUPTHER

## 2019-09-10 RX ORDER — LORAZEPAM 0.5 MG/1
0.5 TABLET ORAL DAILY PRN
Qty: 30 TABLET | Refills: 0 | Status: SHIPPED | OUTPATIENT
Start: 2019-09-10 | End: 2020-02-11

## 2019-09-10 RX ORDER — HYDROCODONE BITARTRATE AND ACETAMINOPHEN 7.5; 325 MG/1; MG/1
1 TABLET ORAL EVERY 4 HOURS PRN
Qty: 180 TABLET | Refills: 0 | Status: SHIPPED | OUTPATIENT
Start: 2019-09-10 | End: 2019-10-14 | Stop reason: SDUPTHER

## 2019-09-10 NOTE — PROGRESS NOTES
Chief Complaint   Patient presents with   • Establish Care     luis eduardo pt     Subjective   Arianajudy Martinez is a 59 y.o. female who presents to the office to establish care and for follow up of chronic conditions, along with refill of hydrocodone for chronic low back pain.    The following portions of the patient's history were reviewed and updated as appropriate: allergies, current medications, past family history, past medical history, past social history, past surgical history and problem list.    History of Present Illness       Upper Respiratory Infection: Has been seen in urgent care twice during the past 3 weeks, most recently on 9/8/19.  Labs on 9/6/19 revealed leukocytosis along with increased creatinine/decreased sodium    Chronic lower back pain: onset over 5years ago. Midline lower back pain radiates down right buttock.  This is a constant aching pain, stabbing at times. Exacerbated with any movement. Managed well with hydrocodone, not a candidate for NSAIDS due to anticoagulation with Plavix. Gradual worsening over the years. Pain today is rated 0/10 today, states that she took medication this AM which reduced her pain. At it's worst she rates it at 7-8/10.    Last lumbar imaging 9/12/17  IMPRESSION:  1. No acute lumbar spine abnormality     Anxiety: well managed with current regimen of Effexor, Abilify, Remeron, and lorazepam.  Due for refill on lorazepam today.     Type 2 Diabetes: Current dose 100units nightly, mealtime insulin 60units with meals. Dexcom running in 90s in AM, 230s-240s in evenings. Reports minimal compliance with diet, does avoid sweetened beverages, but enjoys breads and pastas along with limited protein.  Follows with [facility] with Dr. Onofre. She states that she has an appointment tomorrow.    Vitamin D: on replacement, last level 24.0, improving     B12 deficiency: on monthly IM replacements, sister who is a nurse administers these for her.    CAD:  followed by Dr. Kwon. Has seen him recently.    Past Medical History:   Diagnosis Date   • Angina, class IV (CMS/[facility])    • Anxiety    • Benign paroxysmal positional vertigo    • Carpal tunnel syndrome    • Chronic pain    • Coronary atherosclerosis     hx CABG 2005.  is followed by Dr Kwon   • Depression    • Diabetes mellitus (CMS/[facility])     Type 2, controlled   • Diabetic polyneuropathy (CMS/[facility])    • Elevated cholesterol    • Female stress incontinence    • Gastroparesis    • GERD (gastroesophageal reflux disease)    • Hyperlipidemia    • Hypertension    • Low back pain    • Malaise and fatigue    • Multiple joint pain    • Obesity     Refuses to be weighed   • Otalgia     Both   • Perforation of tympanic membrane     Left   • Postoperative wound infection    • Vitamin D deficiency           Family History   Problem Relation Age of Onset   • Diabetes Other    • Heart disease Other    • Hypertension Other    • Heart disease Mother    • Stroke Mother    • Hypertension Mother    • Diabetes Sister    • Heart disease Sister    • Hypertension Sister    • Heart disease Sister    • Diabetes Sister    • Hypertension Sister    • Diabetes Sister    • Diabetes Sister    • Diabetes Sister    • Diabetes Sister         Review of Systems   Constitutional: Negative for appetite change, chills, fatigue and fever.   HENT: Negative for congestion, ear pain, rhinorrhea and sore throat.    Eyes: Negative for pain.   Respiratory: Negative for cough and shortness of breath.    Cardiovascular: Negative for chest pain and palpitations.   Gastrointestinal: Negative for abdominal pain, constipation, diarrhea and nausea.   Genitourinary: Negative for dysuria.   Musculoskeletal: Positive for arthralgias and back pain. Negative for joint swelling and neck pain.   Skin: Negative for rash.   Neurological: Positive for numbness (BLE, chronic polyneuropathy includes feet.). Negative for dizziness and headaches.   Psychiatric/Behavioral:  "The patient is nervous/anxious.    All other systems reviewed and are negative.      Objective   Vitals:    09/10/19 1041   BP: 120/70   BP Location: Left arm   Patient Position: Sitting   Cuff Size: Adult   Pulse: 67   Resp: 16   Temp: 98.4 °F (36.9 °C)   TempSrc: Tympanic   SpO2: 99%   Weight: 117 kg (257 lb 9.6 oz)   Height: 172.7 cm (68\")   PainSc: 0-No pain     Physical Exam   Constitutional: She is oriented to person, place, and time. She appears well-developed and well-nourished.   HENT:   Head: Normocephalic and atraumatic.   Eyes: Pupils are equal, round, and reactive to light.   Neck: Normal range of motion. Neck supple.   Cardiovascular: Normal rate, regular rhythm and normal heart sounds.   Pulmonary/Chest: Effort normal and breath sounds normal. No respiratory distress. She has no wheezes. She has no rales.   Abdominal: Soft. Bowel sounds are normal.   Musculoskeletal: She exhibits tenderness (left foot, right lower back). She exhibits no edema.        Back:    Neurological: She is alert and oriented to person, place, and time.   Skin: Skin is warm and dry. Capillary refill takes 2 to 3 seconds.   Psychiatric: She has a normal mood and affect.   Nursing note and vitals reviewed.      Assessment/Plan   Ariana was seen today for establish care.    Diagnoses and all orders for this visit:    Generalized anxiety disorder  Comments:  Controlled with lorazepam  Orders:  -     LORazepam (ATIVAN) 0.5 MG tablet; Take 1 tablet by mouth Daily As Needed for Anxiety.    Chronic right-sided low back pain with right-sided sciatica  Comments:  controlled with Norco  Orders:  -     HYDROcodone-acetaminophen (NORCO) 7.5-325 MG per tablet; Take 1 tablet by mouth Every 4 (Four) Hours As Needed for Moderate Pain  or Severe Pain .    B12 deficiency  Comments:  on monthly injections, due for refill    Vitamin D deficiency  Comments:  Improving, on replacement    Coronary artery disease involving native coronary artery of " native heart without angina pectoris  Comments:  Followed by Dr. Cher COBB  Comments:  currently taking clindamycin and tessalon pearls prescribed at urgent care on 9/8/19, reports some improvement in symptoms    Screening for breast cancer  -     Mammo Screening Bilateral With CAD; Future    Long term current use of opiate analgesic  Comments:  Due for UDS  Orders:  -     ToxASSURE Select 13 Discrete -    Cyanocobalamin deficiency  -     cyanocobalamin 1000 MCG/ML injection; Inject 1 mL into the appropriate muscle as directed by prescriber Every 28 (Twenty-Eight) Days.    Generalized anxiety disorder  -     LORazepam (ATIVAN) 0.5 MG tablet; Take 1 tablet by mouth Daily As Needed for Anxiety.    Essential hypertension  -     CBC & Differential; Future  -     Comprehensive Metabolic Panel; Future           PHQ-2/PHQ-9 Depression Screening 9/10/2019   Little interest or pleasure in doing things 0   Feeling down, depressed, or hopeless 0   Trouble falling or staying asleep, or sleeping too much 0   Feeling tired or having little energy 0   Poor appetite or overeating 0   Feeling bad about yourself - or that you are a failure or have let yourself or your family down 0   Trouble concentrating on things, such as reading the newspaper or watching television 0   Moving or speaking so slowly that other people could have noticed. Or the opposite - being so fidgety or restless that you have been moving around a lot more than usual 0   Thoughts that you would be better off dead, or of hurting yourself in some way 0   Total Score 0   Patient understands the risks associated with this controlled medication, including tolerance and addiction.  Patient also agrees to only obtain this medication from me, and not from a another provider, unless that provider is covering for me in my absence.  Patient also agrees to be compliant in dosing, and not self adjust the dose of medication.  A signed controlled substance  agreement is on file, and the patient has received a controlled substance education sheet at this a previous visit.  The patient has also signed a consent for treatment with a controlled substance as per [facility] policy. LESTER was obtained.    [facility]         Return in about 3 months (around 12/10/2019).    There are no Patient Instructions on file for this visit.

## 2019-09-11 ENCOUNTER — OFFICE VISIT (OUTPATIENT)
Dept: ENDOCRINOLOGY | Facility: CLINIC | Age: 59
End: 2019-09-11

## 2019-09-11 VITALS
HEIGHT: 68 IN | OXYGEN SATURATION: 92 % | BODY MASS INDEX: 39.09 KG/M2 | WEIGHT: 257.9 LBS | SYSTOLIC BLOOD PRESSURE: 130 MMHG | DIASTOLIC BLOOD PRESSURE: 78 MMHG | HEART RATE: 98 BPM

## 2019-09-11 DIAGNOSIS — D69.1 ABNORMAL PLATELETS (HCC): ICD-10-CM

## 2019-09-11 DIAGNOSIS — E55.9 VITAMIN D DEFICIENCY: ICD-10-CM

## 2019-09-11 DIAGNOSIS — IMO0002 UNCONTROLLED TYPE 2 DIABETES MELLITUS WITH NEUROLOGIC COMPLICATION, WITH LONG-TERM CURRENT USE OF INSULIN: ICD-10-CM

## 2019-09-11 DIAGNOSIS — E78.2 MIXED HYPERLIPIDEMIA: Primary | ICD-10-CM

## 2019-09-11 PROCEDURE — 99214 OFFICE O/P EST MOD 30 MIN: CPT | Performed by: NURSE PRACTITIONER

## 2019-09-11 NOTE — PROGRESS NOTES
Subjective    Ariana Martinez is a 59 y.o. female. she is here today for follow-up.    History of Present Illness       Duration/Timing: Diabetes mellitus type 2, Age at onset of diabetes: 26 years years, Onset of symptoms gradual  timing - constant     qualtiy - uncontrolled     severity - moderate     patient broke left foot   -----------------------     Severity (Complications/Hospitalizations)  Secondary Macrovascular Complications: No CAD, No CVA, No PAD  Secondary Microvascular Complications: No Diabetic Nephropathy, No Diabetic Retinopathy, Diabetic Neuropathy     Context  Diabetes Regimen: Insulin, Oral Medications                Lab Results   Component Value Date    HGBA1C 8.49 (H) 09/06/2019                Blood Glucose Readings     Now on dexcom sensor         Did not bring to office         This am 240      States ate late last night      Has had low in the middle of the night she did decrease Basaglar and has no lows since decrease               Diet  variable carb intake  Exercise: Exercises  walking     Associated Signs/Symptoms  Hyperglycemic Symptoms: No polyuria, No polydipsia, No polyphagia, No weight gain  Hypoglycemic Episodes: Documented symptomatic hypoglycemia, Seizures and syncope related to hypoglycemia                           The following portions of the patient's history were reviewed and updated as appropriate:   Past Medical History:   Diagnosis Date   • Angina, class IV (CMS/HCC)    • Anxiety    • Benign paroxysmal positional vertigo    • Carpal tunnel syndrome    • Chronic pain    • Coronary atherosclerosis     hx CABG 2005.  is followed by Dr Kwon   • Depression    • Diabetes mellitus (CMS/HCC)     Type 2, controlled   • Diabetic polyneuropathy (CMS/HCC)    • Elevated cholesterol    • Female stress incontinence    • Gastroparesis    • GERD (gastroesophageal reflux disease)    • Hyperlipidemia    • Hypertension    • Low back pain    • Malaise and fatigue    • Multiple  joint pain    • Obesity     Refuses to be weighed   • Otalgia     Both   • Perforation of tympanic membrane     Left   • Postoperative wound infection    • Vitamin D deficiency      Past Surgical History:   Procedure Laterality Date   • ABDOMINAL SURGERY     • ANGIOPLASTY      coronary   • BREAST BIOPSY Right    • CARDIAC CATHETERIZATION     • CARDIAC CATHETERIZATION N/A 2017    Procedure: Right Heart Cath;  Surgeon: Can Kwon MD PhD;  Location: [facility] CATH INVASIVE LOCATION;  Service:    • CARPAL TUNNEL RELEASE     • CHOLECYSTECTOMY     • CORONARY ARTERY BYPASS GRAFT     • ENDOSCOPY N/A 10/19/2018    Procedure: ESOPHAGOGASTRODUODENOSCOPY possible dilation;  Surgeon: Julián Maldonado MD;  Location: [facility] ENDOSCOPY;  Service: Gastroenterology   • ENDOSCOPY AND COLONOSCOPY     • FOOT SURGERY      Toes   • GASTRIC BANDING      Revision, laparoscopic   • HYSTERECTOMY     • MOUTH SURGERY     • SALPINGO OOPHORECTOMY     • SHOULDER SURGERY     • SUBTALAR ARTHRODESIS Left 2019    Procedure: LEFT FOOT HARDWARE REMOVAL, FIFTH METATARSAL , OPEN REDUCTION INTERNAL FIXATION, CALCANEAL OSTEOTOMY;  Surgeon: Ignacio Lord DPM;  Location: [facility] OR;  Service: Podiatry   • TRANSESOPHAGEAL ECHOCARDIOGRAM (LAMONTE)      With color flow     Family History   Problem Relation Age of Onset   • Diabetes Other    • Heart disease Other    • Hypertension Other    • Heart disease Mother    • Stroke Mother    • Hypertension Mother    • Diabetes Sister    • Heart disease Sister    • Hypertension Sister    • Heart disease Sister    • Diabetes Sister    • Hypertension Sister    • Diabetes Sister    • Diabetes Sister    • Diabetes Sister    • Diabetes Sister      OB History      Para Term  AB Living    0 0 0 0 0 0    SAB TAB Ectopic Molar Multiple Live Births    0 0 0   0          Current Outpatient Medications   Medication Sig Dispense Refill   • ARIPiprazole (ABILIFY) 15 MG tablet Take 1 tablet by mouth  Daily. 90 tablet 1   • aspirin 81 MG chewable tablet Chew 81 mg daily.     • atorvastatin (LIPITOR) 40 MG tablet Take 40 mg by mouth Every Night.     • BD SHARPS CONTAINER HOME misc 1 each Take As Directed. 1 each 0   • benzonatate (TESSALON) 200 MG capsule Take 1 capsule by mouth 3 (Three) Times a Day As Needed for Cough. 30 capsule 0   • Blood Glucose Monitoring Suppl (ONE TOUCH ULTRA MINI) w/Device kit USE AS DIRECTED TO CHECK BLOOD SUGAR 1 each 0   • Calcium Citrate-Vitamin D (CITRACAL/VITAMIN D) 250-200 MG-UNIT tablet Take 2 tablets by mouth 2 (two) times a day.     • clopidogrel (PLAVIX) 75 MG tablet Take 75 mg by mouth Daily.     • cyanocobalamin 1000 MCG/ML injection Inject 1 mL into the appropriate muscle as directed by prescriber Every 28 (Twenty-Eight) Days. 1 mL 0   • doxycycline (MONODOX) 100 MG capsule Take 1 capsule by mouth 2 (Two) Times a Day for 10 days. 20 capsule 0   • gabapentin (NEURONTIN) 400 MG capsule Take 400 mg by mouth 3 (Three) Times a Day.     • GLUCAGON EMERGENCY 1 MG injection USE AS DIRECTED AS NEEDED 1 kit 0   • glucose blood (ONE TOUCH ULTRA TEST) test strip 1 each by Other route 4 (Four) Times a Day. Use as instructed 400 each 1   • hydrochlorothiazide (MICROZIDE) 12.5 MG capsule TAKE 1 CAPSULE BY MOUTH DAILY. 90 capsule 0   • HYDROcodone-acetaminophen (NORCO) 7.5-325 MG per tablet Take 1 tablet by mouth Every 4 (Four) Hours As Needed for Moderate Pain  or Severe Pain . 180 tablet 0   • insulin aspart (novoLOG FLEXPEN) 100 UNIT/ML solution pen-injector sc pen Inject 60 Units under the skin into the appropriate area as directed 3 (Three) Times a Day With Meals.     • insulin aspart (NOVOLOG FLEXPEN) 100 UNIT/ML solution pen-injector sc pen INJECT 60 UNITS BEFORE MEALS 3 TIMES A DAY 5 pen 5   • Insulin Glargine (BASAGLAR KWIKPEN) 100 UNIT/ML injection pen Inject 100 Units under the skin into the appropriate area as directed Every Night. 5 pen 5   • Insulin Pen Needle (B-D ULTRAFINE  "III SHORT PEN) 31G X 8 MM misc USE TO INJECT 4 TIMES A  each 11   • LORazepam (ATIVAN) 0.5 MG tablet Take 1 tablet by mouth Daily As Needed for Anxiety. 30 tablet 0   • meclizine (ANTIVERT) 25 MG tablet Take 1 tablet by mouth 3 (Three) Times a Day As Needed for dizziness. 90 tablet 6   • metoclopramide (REGLAN) 10 MG tablet Take 10 mg by mouth 4 (Four) Times a Day As Needed.     • metoprolol succinate XL (TOPROL-XL) 25 MG 24 hr tablet TAKE 1 TABLET BY MOUTH DAILY. 31 tablet 6   • mirtazapine (REMERON) 45 MG tablet TAKE 1 TABLET BY MOUTH EVERY NIGHT. 90 tablet 0   • omeprazole (PRILOSEC) 40 MG capsule Take 1 capsule by mouth Daily. 90 capsule 1   • ONE TOUCH ULTRA TEST test strip USE TO TEST SUGAR 4 TIMES A  each 3   • promethazine-dextromethorphan (PROMETHAZINE-DM) 6.25-15 MG/5ML syrup Take one teaspoon qhs prn cough 120 mL 0   • Syringe/Needle, Disp, (LUER LOCK SAFETY SYRINGES) 25G X 5/8\" 3 ML misc 1 each Daily. 100 each 0   • venlafaxine (EFFEXOR) 100 MG tablet Take 1 tablet by mouth 3 (Three) Times a Day. 90 tablet 5   • vitamin D (ERGOCALCIFEROL) 91518 units capsule capsule Take 50,000 Units by mouth 1 (One) Time Per Week. sunday       No current facility-administered medications for this visit.      Allergies   Allergen Reactions   • Seroquel [Quetiapine] Anaphylaxis   • Avandia [Rosiglitazone] Swelling   • Morphine And Related Hallucinations   • Oxycodone Hallucinations     Social History     Socioeconomic History   • Marital status:      Spouse name: Not on file   • Number of children: Not on file   • Years of education: Not on file   • Highest education level: Not on file   Tobacco Use   • Smoking status: Never Smoker   • Smokeless tobacco: Never Used   Substance and Sexual Activity   • Alcohol use: No   • Drug use: No   • Sexual activity: Defer       Review of Systems  Review of Systems   Constitutional: Negative for activity change, appetite change, diaphoresis and fatigue.   HENT: " "Negative for facial swelling, sneezing, sore throat, tinnitus, trouble swallowing and voice change.    Eyes: Negative for photophobia, pain, discharge, redness, itching and visual disturbance.   Respiratory: Negative for apnea, cough, choking, chest tightness and shortness of breath.    Cardiovascular: Negative for chest pain, palpitations and leg swelling.   Gastrointestinal: Negative for abdominal distention, abdominal pain, constipation, diarrhea, nausea and vomiting.   Endocrine: Negative for cold intolerance, heat intolerance, polydipsia, polyphagia and polyuria.   Genitourinary: Negative for difficulty urinating, dysuria, frequency, hematuria and urgency.   Musculoskeletal: Negative for arthralgias, back pain, gait problem, joint swelling, myalgias, neck pain and neck stiffness.   Skin: Negative for color change, pallor, rash and wound.   Neurological: Negative for dizziness, tremors, weakness, light-headedness, numbness and headaches.   Hematological: Negative for adenopathy. Does not bruise/bleed easily.   Psychiatric/Behavioral: Negative for behavioral problems, confusion and sleep disturbance.        Objective    /78   Pulse 98   Ht 172.7 cm (68\")   Wt 117 kg (257 lb 14.4 oz) Comment: Previous Weight. PT stated she did not want to weight today  SpO2 92%   BMI 39.21 kg/m²   Physical Exam   Constitutional: She is oriented to person, place, and time. She appears well-developed and well-nourished. No distress.   HENT:   Head: Normocephalic and atraumatic.   Right Ear: External ear normal.   Left Ear: External ear normal.   Nose: Nose normal.   Eyes: Conjunctivae and EOM are normal. Pupils are equal, round, and reactive to light.   Neck: Normal range of motion. Neck supple. No tracheal deviation present. No thyromegaly present.   Cardiovascular: Normal rate, regular rhythm and normal heart sounds.   No murmur heard.  Pulmonary/Chest: Effort normal and breath sounds normal. No respiratory distress. " She has no wheezes.   Abdominal: Soft. Bowel sounds are normal. There is no tenderness. There is no rebound and no guarding.   Musculoskeletal: Normal range of motion. She exhibits no edema, tenderness or deformity.   Neurological: She is alert and oriented to person, place, and time. No cranial nerve deficit.   Skin: Skin is warm and dry. No rash noted.   Psychiatric: She has a normal mood and affect. Her behavior is normal. Judgment and thought content normal.       Lab Review  Glucose (mg/dL)   Date Value   09/06/2019 227 (H)   08/22/2019 151 (H)   07/13/2019 181 (H)     Glucose, Arterial (mmol/L)   Date Value   10/14/2017 155     Sodium (mmol/L)   Date Value   09/06/2019 134 (L)   08/22/2019 140   07/13/2019 142     Potassium (mmol/L)   Date Value   09/06/2019 3.6   08/22/2019 3.9   07/13/2019 3.9     Chloride (mmol/L)   Date Value   09/06/2019 93 (L)   08/22/2019 100   07/13/2019 103     CO2 (mmol/L)   Date Value   09/06/2019 25.1   08/22/2019 27.0   07/13/2019 27.0     BUN (mg/dL)   Date Value   09/06/2019 17   08/22/2019 14   07/13/2019 13     Creatinine (mg/dL)   Date Value   09/06/2019 1.12 (H)   08/22/2019 0.94   07/13/2019 0.97     Hemoglobin A1C (%)   Date Value   09/06/2019 8.49 (H)   07/12/2019 8.80 (H)   06/07/2019 8.20 (H)     Triglycerides (mg/dL)   Date Value   07/13/2019 214 (H)   06/07/2019 219 (H)   04/16/2018 223 (H)     LDL Cholesterol  (mg/dL)   Date Value   07/13/2019 92   06/07/2019 98   04/16/2018 108   01/18/2018 130 (H)   07/20/2017 119       Assessment/Plan      1. Mixed hyperlipidemia    2. Vitamin D deficiency    3. 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, with long-term current use of insulin (CMS/[facility])    4. Abnormal platelets (CMS/[facility])    .    Medications prescribed:  Outpatient Encounter Medications as of 9/11/2019   Medication Sig Dispense Refill   • ARIPiprazole (ABILIFY) 15 MG tablet Take 1 tablet by mouth Daily. 90 tablet 1   • aspirin 81 MG chewable  tablet Chew 81 mg daily.     • atorvastatin (LIPITOR) 40 MG tablet Take 40 mg by mouth Every Night.     • BD SHARPS CONTAINER HOME misc 1 each Take As Directed. 1 each 0   • benzonatate (TESSALON) 200 MG capsule Take 1 capsule by mouth 3 (Three) Times a Day As Needed for Cough. 30 capsule 0   • Blood Glucose Monitoring Suppl (ONE TOUCH ULTRA MINI) w/Device kit USE AS DIRECTED TO CHECK BLOOD SUGAR 1 each 0   • Calcium Citrate-Vitamin D (CITRACAL/VITAMIN D) 250-200 MG-UNIT tablet Take 2 tablets by mouth 2 (two) times a day.     • clopidogrel (PLAVIX) 75 MG tablet Take 75 mg by mouth Daily.     • cyanocobalamin 1000 MCG/ML injection Inject 1 mL into the appropriate muscle as directed by prescriber Every 28 (Twenty-Eight) Days. 1 mL 0   • doxycycline (MONODOX) 100 MG capsule Take 1 capsule by mouth 2 (Two) Times a Day for 10 days. 20 capsule 0   • gabapentin (NEURONTIN) 400 MG capsule Take 400 mg by mouth 3 (Three) Times a Day.     • GLUCAGON EMERGENCY 1 MG injection USE AS DIRECTED AS NEEDED 1 kit 0   • glucose blood (ONE TOUCH ULTRA TEST) test strip 1 each by Other route 4 (Four) Times a Day. Use as instructed 400 each 1   • hydrochlorothiazide (MICROZIDE) 12.5 MG capsule TAKE 1 CAPSULE BY MOUTH DAILY. 90 capsule 0   • HYDROcodone-acetaminophen (NORCO) 7.5-325 MG per tablet Take 1 tablet by mouth Every 4 (Four) Hours As Needed for Moderate Pain  or Severe Pain . 180 tablet 0   • insulin aspart (novoLOG FLEXPEN) 100 UNIT/ML solution pen-injector sc pen Inject 60 Units under the skin into the appropriate area as directed 3 (Three) Times a Day With Meals.     • insulin aspart (NOVOLOG FLEXPEN) 100 UNIT/ML solution pen-injector sc pen INJECT 60 UNITS BEFORE MEALS 3 TIMES A DAY 5 pen 5   • Insulin Glargine (BASAGLAR KWIKPEN) 100 UNIT/ML injection pen Inject 100 Units under the skin into the appropriate area as directed Every Night. 5 pen 5   • Insulin Pen Needle (B-D ULTRAFINE III SHORT PEN) 31G X 8 MM misc USE TO INJECT 4  "TIMES A  each 11   • LORazepam (ATIVAN) 0.5 MG tablet Take 1 tablet by mouth Daily As Needed for Anxiety. 30 tablet 0   • meclizine (ANTIVERT) 25 MG tablet Take 1 tablet by mouth 3 (Three) Times a Day As Needed for dizziness. 90 tablet 6   • metoclopramide (REGLAN) 10 MG tablet Take 10 mg by mouth 4 (Four) Times a Day As Needed.     • metoprolol succinate XL (TOPROL-XL) 25 MG 24 hr tablet TAKE 1 TABLET BY MOUTH DAILY. 31 tablet 6   • mirtazapine (REMERON) 45 MG tablet TAKE 1 TABLET BY MOUTH EVERY NIGHT. 90 tablet 0   • omeprazole (PRILOSEC) 40 MG capsule Take 1 capsule by mouth Daily. 90 capsule 1   • ONE TOUCH ULTRA TEST test strip USE TO TEST SUGAR 4 TIMES A  each 3   • promethazine-dextromethorphan (PROMETHAZINE-DM) 6.25-15 MG/5ML syrup Take one teaspoon qhs prn cough 120 mL 0   • Syringe/Needle, Disp, (LUER LOCK SAFETY SYRINGES) 25G X 5/8\" 3 ML misc 1 each Daily. 100 each 0   • venlafaxine (EFFEXOR) 100 MG tablet Take 1 tablet by mouth 3 (Three) Times a Day. 90 tablet 5   • vitamin D (ERGOCALCIFEROL) 16460 units capsule capsule Take 50,000 Units by mouth 1 (One) Time Per Week. sunday       No facility-administered encounter medications on file as of 9/11/2019.        Orders placed during this encounter include:  Orders Placed This Encounter   Procedures   • Comprehensive Metabolic Panel   • Hemoglobin A1c   • Lipid Panel   • Vitamin D 25 Hydroxy   • TSH   • Protein / Creatinine Ratio, Urine - Urine, Clean Catch   • Microalbumin / Creatinine Urine Ratio - Urine, Clean Catch   • Ambulatory Referral to Hematology     Referral Priority:   Routine     Referral Type:   Consultation     Referral Reason:   Specialty Services Required     Requested Specialty:   Hematology     Number of Visits Requested:   1   • CBC & Differential     Order Specific Question:   Manual Differential     Answer:   No   Glycemic Management                   Lab Results   Component Value Date    HGBA1C 8.49 (H) 09/06/2019        "         Dexcom sensor ---she forgot to bring sensor             Basaglar taking 100 units         ==================        Novolog      Taking 60 units ---increase to 64      Discussed carb counting but states cannot     She will need to look at her meals because 30 units may not be enough for some meals and for others meals to strong     If you eat a meal and give 30 units and your sugar is 200 or higher before the next meal look back at that meal and the next time you eat it either give more insulin or eat less     Also if you have a low after the meal give less insulin the next time you eat that meal         ==================     Jardiance 10 mg tablet , take before breakfast---will stop due to dizziness for possible volume depletion-----the dizziness has resolved since stopping jardiance        ==================     Metformin 500 mg tablets - caused diarrhea --stopped      ====================     start bydureon - stopped        Victoza stopped due to side effects      Warned of the gastric side effects she does have gastroparesis but she wants to try      Gave a sample she will let me know if she wants an RX called in      If vomiting or abdominal pain stop        januvia 100 mg daily  -stopped      ------------------------------     Lipid Management        on Lipitor   on fenofibrate                 LDL needs to be 70 or less     add zetia     Also discussed restarting the jardiance for the heart benefits but refuses     Component      Latest Ref Rng & Units 4/16/2018   Total Cholesterol      0 - 199 mg/dL 197   Triglycerides      20 - 199 mg/dL 223 (H)   HDL Cholesterol      60 - 200 mg/dL 33 (L)   LDL Cholesterol       1 - 129 mg/dL 108   LDL/HDL Ratio      0.00 - 3.22 3.62 (H)               Blood Pressure Management: Control of blood pressure  on ACEi     stopped the lasix         Microvascular Complication Monitoring: Neuropathy type sensorial     Neurontin 400 mg po TID -- moderate relief but not  complete           intermittetly takes reglan for gastroparesis     states eye exam ws 2018  RX for shoes - diabetic neuropathy      Immunization - last influenza vaccine Oct. 2018        Other Diabetes Related Aspects     TSH abnormal from July to Sept 2014     TSH in the 5 to 7 range          Lab Results   Component Value Date    TSH 3.990 09/06/2019            ---    Continue vitamin d supplement      April 2018     Vitamin d -28       Component      Latest Ref Rng & Units 9/6/2019   25 Hydroxy, Vitamin D      30.0 - 100.0 ng/ml 24.0 (L)             Referral to GI      Reason - diarrhea chronic      History of gastroparesis- -takes reglan         elevated platelets and wbc - refer to hematology      4. Follow-up: Return in about 3 months (around 12/11/2019) for Recheck.

## 2019-09-24 ENCOUNTER — LAB (OUTPATIENT)
Dept: LAB | Facility: HOSPITAL | Age: 59
End: 2019-09-24

## 2019-09-24 DIAGNOSIS — I10 ESSENTIAL HYPERTENSION: ICD-10-CM

## 2019-09-24 LAB
ALBUMIN SERPL-MCNC: 4.4 G/DL (ref 3.5–5.2)
ALBUMIN/GLOB SERPL: 1.3 G/DL
ALP SERPL-CCNC: 141 U/L (ref 39–117)
ALT SERPL W P-5'-P-CCNC: 11 U/L (ref 1–33)
ANION GAP SERPL CALCULATED.3IONS-SCNC: 16.2 MMOL/L (ref 5–15)
AST SERPL-CCNC: 17 U/L (ref 1–32)
BASOPHILS # BLD AUTO: 0.08 10*3/MM3 (ref 0–0.2)
BASOPHILS NFR BLD AUTO: 0.6 % (ref 0–1.5)
BILIRUB SERPL-MCNC: 0.3 MG/DL (ref 0.2–1.2)
BUN BLD-MCNC: 13 MG/DL (ref 6–20)
BUN/CREAT SERPL: 11.7 (ref 7–25)
CALCIUM SPEC-SCNC: 9.2 MG/DL (ref 8.6–10.5)
CHLORIDE SERPL-SCNC: 99 MMOL/L (ref 98–107)
CO2 SERPL-SCNC: 29.8 MMOL/L (ref 22–29)
CREAT BLD-MCNC: 1.11 MG/DL (ref 0.57–1)
DEPRECATED RDW RBC AUTO: 43.6 FL (ref 37–54)
EOSINOPHIL # BLD AUTO: 0.54 10*3/MM3 (ref 0–0.4)
EOSINOPHIL NFR BLD AUTO: 4.3 % (ref 0.3–6.2)
ERYTHROCYTE [DISTWIDTH] IN BLOOD BY AUTOMATED COUNT: 14.5 % (ref 12.3–15.4)
GFR SERPL CREATININE-BSD FRML MDRD: 51 ML/MIN/1.73
GLOBULIN UR ELPH-MCNC: 3.3 GM/DL
GLUCOSE BLD-MCNC: 69 MG/DL (ref 65–99)
HCT VFR BLD AUTO: 41.4 % (ref 34–46.6)
HGB BLD-MCNC: 13.6 G/DL (ref 12–15.9)
IMM GRANULOCYTES # BLD AUTO: 0.07 10*3/MM3 (ref 0–0.05)
IMM GRANULOCYTES NFR BLD AUTO: 0.6 % (ref 0–0.5)
LYMPHOCYTES # BLD AUTO: 3.51 10*3/MM3 (ref 0.7–3.1)
LYMPHOCYTES NFR BLD AUTO: 28.1 % (ref 19.6–45.3)
MCH RBC QN AUTO: 27.6 PG (ref 26.6–33)
MCHC RBC AUTO-ENTMCNC: 32.9 G/DL (ref 31.5–35.7)
MCV RBC AUTO: 84 FL (ref 79–97)
MONOCYTES # BLD AUTO: 0.93 10*3/MM3 (ref 0.1–0.9)
MONOCYTES NFR BLD AUTO: 7.4 % (ref 5–12)
NEUTROPHILS # BLD AUTO: 7.36 10*3/MM3 (ref 1.7–7)
NEUTROPHILS NFR BLD AUTO: 59 % (ref 42.7–76)
NRBC BLD AUTO-RTO: 0 /100 WBC (ref 0–0.2)
PLATELET # BLD AUTO: 506 10*3/MM3 (ref 140–450)
PMV BLD AUTO: 10.2 FL (ref 6–12)
POTASSIUM BLD-SCNC: 3.8 MMOL/L (ref 3.5–5.2)
PROT SERPL-MCNC: 7.7 G/DL (ref 6–8.5)
RBC # BLD AUTO: 4.93 10*6/MM3 (ref 3.77–5.28)
SODIUM BLD-SCNC: 145 MMOL/L (ref 136–145)
WBC NRBC COR # BLD: 12.49 10*3/MM3 (ref 3.4–10.8)

## 2019-09-24 PROCEDURE — 80053 COMPREHEN METABOLIC PANEL: CPT

## 2019-09-24 PROCEDURE — 80307 DRUG TEST PRSMV CHEM ANLYZR: CPT | Performed by: NURSE PRACTITIONER

## 2019-09-24 PROCEDURE — 85025 COMPLETE CBC W/AUTO DIFF WBC: CPT

## 2019-09-24 PROCEDURE — G0481 DRUG TEST DEF 8-14 CLASSES: HCPCS | Performed by: NURSE PRACTITIONER

## 2019-09-24 PROCEDURE — 36415 COLL VENOUS BLD VENIPUNCTURE: CPT

## 2019-09-24 RX ORDER — VENLAFAXINE HYDROCHLORIDE 150 MG/1
CAPSULE, EXTENDED RELEASE ORAL
Qty: 60 CAPSULE | Refills: 2 | Status: SHIPPED | OUTPATIENT
Start: 2019-09-24 | End: 2019-10-07

## 2019-09-28 LAB
6-ACETYLMORPHINE: NEGATIVE
6MAM SERPLBLD-MCNC: NOT DETECTED NG/MG CREAT
7-AMINOCLONAZEPAM UR: NOT DETECTED NG/MG CREAT
A-OH ALPRAZ/CREAT UR: NOT DETECTED NG/MG CREAT
ALFENTANIL UR QL: NOT DETECTED NG/MG CREAT
ALPHA-HYDROXYMIDAZOLAM, URINE: NOT DETECTED NG/MG CREAT
ALPHA-HYDROXYTRIAZOLAM, URINE: NOT DETECTED NG/MG CREAT
AMOBARBITAL UR: NOT DETECTED
AMPHET UR QL CFM: NOT DETECTED NG/MG CREAT
AMPHETAMINES UR QL SCN: NEGATIVE
BARBITAL UR QL CFM: NOT DETECTED
BARBITURATES UR QL SCN: NEGATIVE
BENZODIAZ UR QL SCN: NORMAL
BENZOYLECGONINE UR: NOT DETECTED NG/MG CREAT
BUPRENORPHINE UR QL: NEGATIVE
BUPRENORPHINE UR QL: NOT DETECTED NG/MG CREAT
BUTABARBITAL UR QL: NOT DETECTED
BUTALBITAL UR QL: NOT DETECTED
CANNABINOIDS UR QL CFM: NEGATIVE
CLONAZEPAM UR QL: NOT DETECTED NG/MG CREAT
COCAETHYLENE UR QL CFM: NOT DETECTED NG/MG CREAT
COCAINE UR QL CFM: NEGATIVE
CODEINE UR QL: NOT DETECTED NG/MG CREAT
CONV COCAINE, UR: NOT DETECTED NG/MG CREAT
CONV REPORT SUMMARY: NORMAL
CREAT 24H UR-MCNC: 41 MG/DL
DESALKYLFLURAZ/CREAT UR: NOT DETECTED NG/MG CREAT
DESMETHYLFLUNITRAZEPAM: NOT DETECTED NG/MG CREAT
DIAZEPAM UR-MCNC: NOT DETECTED NG/MG CREAT
DIHYDROCODEINE UR: NOT DETECTED NG/MG CREAT
EDDP SERPL QL: NOT DETECTED NG/MG CREAT
ETHANOL SCREEN URINE (REF): NEGATIVE
ETHANOL UR-MCNC: NOT DETECTED G/DL
FENTANYL UR QL: NEGATIVE
FENTANYL+NORFENTANYL UR QL SCN: NOT DETECTED NG/MG CREAT
FLUNITRAZEPAM SERPLBLD-MCNC: NOT DETECTED NG/MG CREAT
HYDROCODONE UR QL: 1232 NG/MG CREAT
HYDROMORPHONE UR QL: NOT DETECTED NG/MG CREAT
LEVEL OF DETECTION:: NORMAL
LORAZEPAM UR-MCNC: 241 NG/MG CREAT
LORAZEPAM/CREAT UR: NOT DETECTED NG/MG CREAT
MDA SERPLBLD-MCNC: NOT DETECTED NG/MG CREAT
MDMA UR QL SCN: NOT DETECTED NG/MG CREAT
MEPHOBARBITAL UR QL CFM: NOT DETECTED
METHADONE BLD QL SCN: NEGATIVE
METHADONE, URINE: NOT DETECTED NG/MG CREAT
METHAMPHETAMINE UR: NOT DETECTED NG/MG CREAT
MIDAZOLAM UR-MCNC: NOT DETECTED NG/MG CREAT
MORPHINE UR QL: NOT DETECTED NG/MG CREAT
N-DESMETHYLTRAMADOL, U: NOT DETECTED NG/MG CREAT
NARCOTICS UR: NEGATIVE
NORBUPRENORPHINE SERPLBLD-MCNC: NOT DETECTED NG/MG CREAT
NORCODEINE UR-MCNC: NOT DETECTED NG/MG CREAT
NORDIAZEPAM SERPL CFM-MCNC: NOT DETECTED NG/MG CREAT
NORFENTANYL UR: NOT DETECTED NG/MG CREAT
NORMORPHINE: NOT DETECTED NG/MG CREAT
NOROXYMORPHONE: NOT DETECTED NG/MG CREAT
O-DESMETHYLTRAMADOL, UR: NOT DETECTED NG/MG CREAT
OPIATES UR QL CFM: NORMAL
OPIATES, URINE, NORHYDROCODONE: 1176 NG/MG CREAT
OPIATES, URINE, NOROXYCODONE: NOT DETECTED NG/MG CREAT
OXAZEPAM UR QL: NOT DETECTED NG/MG CREAT
OXYCODONE UR QL: NEGATIVE
OXYCODONE UR: NOT DETECTED NG/MG CREAT
OXYMORPHONE UR: NOT DETECTED NG/MG CREAT
PENTOBARBITAL UR: NOT DETECTED
PHENOBARB UR QL: NOT DETECTED
SECOBARBITAL UR QL: NOT DETECTED
SUFENTANIL UR QL: NOT DETECTED NG/MG CREAT
TAPENTADOL SERPLBLD-MCNC: NEGATIVE NG/ML
TAPENTADOL UR-MCNC: NOT DETECTED NG/MG CREAT
TEMAZEPAM UR QL CFM: NOT DETECTED NG/MG CREAT
THC UR CFM-MCNC: NOT DETECTED NG/MG CREAT
THIOPENTAL UR QL CFM: NOT DETECTED
TRAMADOL UR: NOT DETECTED NG/MG CREAT

## 2019-09-30 ENCOUNTER — CONSULT (OUTPATIENT)
Dept: ONCOLOGY | Facility: CLINIC | Age: 59
End: 2019-09-30

## 2019-09-30 ENCOUNTER — APPOINTMENT (OUTPATIENT)
Dept: ONCOLOGY | Facility: HOSPITAL | Age: 59
End: 2019-09-30

## 2019-09-30 VITALS
DIASTOLIC BLOOD PRESSURE: 65 MMHG | BODY MASS INDEX: 38.92 KG/M2 | TEMPERATURE: 97.2 F | HEART RATE: 79 BPM | WEIGHT: 256 LBS | OXYGEN SATURATION: 97 % | RESPIRATION RATE: 16 BRPM | SYSTOLIC BLOOD PRESSURE: 98 MMHG

## 2019-09-30 DIAGNOSIS — D75.839 THROMBOCYTOSIS: ICD-10-CM

## 2019-09-30 DIAGNOSIS — D75.839 THROMBOCYTOSIS: Primary | ICD-10-CM

## 2019-09-30 DIAGNOSIS — E55.9 VITAMIN D DEFICIENCY: ICD-10-CM

## 2019-09-30 DIAGNOSIS — D72.828 OTHER ELEVATED WHITE BLOOD CELL (WBC) COUNT: ICD-10-CM

## 2019-09-30 PROBLEM — D72.829 LEUKOCYTOSIS: Status: ACTIVE | Noted: 2019-09-30

## 2019-09-30 LAB
BASOPHILS # BLD AUTO: 0.06 10*3/MM3 (ref 0–0.2)
BASOPHILS NFR BLD AUTO: 0.5 % (ref 0–1.5)
CRP SERPL-MCNC: 0.48 MG/DL (ref 0–0.5)
DEPRECATED RDW RBC AUTO: 44.3 FL (ref 37–54)
EOSINOPHIL # BLD AUTO: 0.49 10*3/MM3 (ref 0–0.4)
EOSINOPHIL NFR BLD AUTO: 3.7 % (ref 0.3–6.2)
ERYTHROCYTE [DISTWIDTH] IN BLOOD BY AUTOMATED COUNT: 14.5 % (ref 12.3–15.4)
ERYTHROCYTE [SEDIMENTATION RATE] IN BLOOD: 33 MM/HR (ref 0–20)
FERRITIN SERPL-MCNC: 46.35 NG/ML (ref 13–150)
HCT VFR BLD AUTO: 40.4 % (ref 34–46.6)
HGB BLD-MCNC: 13.1 G/DL (ref 12–15.9)
IMM GRANULOCYTES # BLD AUTO: 0.07 10*3/MM3 (ref 0–0.05)
IMM GRANULOCYTES NFR BLD AUTO: 0.5 % (ref 0–0.5)
IRON 24H UR-MRATE: 41 MCG/DL (ref 37–145)
IRON SATN MFR SERPL: 11 % (ref 20–50)
LDH SERPL-CCNC: 192 U/L (ref 135–214)
LYMPHOCYTES # BLD AUTO: 3.27 10*3/MM3 (ref 0.7–3.1)
LYMPHOCYTES NFR BLD AUTO: 24.9 % (ref 19.6–45.3)
MCH RBC QN AUTO: 27.3 PG (ref 26.6–33)
MCHC RBC AUTO-ENTMCNC: 32.4 G/DL (ref 31.5–35.7)
MCV RBC AUTO: 84.3 FL (ref 79–97)
MONOCYTES # BLD AUTO: 0.94 10*3/MM3 (ref 0.1–0.9)
MONOCYTES NFR BLD AUTO: 7.2 % (ref 5–12)
NEUTROPHILS # BLD AUTO: 8.29 10*3/MM3 (ref 1.7–7)
NEUTROPHILS NFR BLD AUTO: 63.2 % (ref 42.7–76)
NRBC BLD AUTO-RTO: 0 /100 WBC (ref 0–0.2)
PLATELET # BLD AUTO: 407 10*3/MM3 (ref 140–450)
PMV BLD AUTO: 9.5 FL (ref 6–12)
RBC # BLD AUTO: 4.79 10*6/MM3 (ref 3.77–5.28)
TIBC SERPL-MCNC: 377 MCG/DL (ref 298–536)
TRANSFERRIN SERPL-MCNC: 253 MG/DL (ref 200–360)
WBC NRBC COR # BLD: 13.12 10*3/MM3 (ref 3.4–10.8)

## 2019-09-30 PROCEDURE — 1157F ADVNC CARE PLAN IN RCRD: CPT | Performed by: INTERNAL MEDICINE

## 2019-09-30 PROCEDURE — G0463 HOSPITAL OUTPT CLINIC VISIT: HCPCS | Performed by: INTERNAL MEDICINE

## 2019-09-30 PROCEDURE — 82728 ASSAY OF FERRITIN: CPT | Performed by: INTERNAL MEDICINE

## 2019-09-30 PROCEDURE — 85651 RBC SED RATE NONAUTOMATED: CPT | Performed by: INTERNAL MEDICINE

## 2019-09-30 PROCEDURE — 84466 ASSAY OF TRANSFERRIN: CPT | Performed by: INTERNAL MEDICINE

## 2019-09-30 PROCEDURE — G8731 PAIN NEG NO PLAN: HCPCS | Performed by: INTERNAL MEDICINE

## 2019-09-30 PROCEDURE — 83615 LACTATE (LD) (LDH) ENZYME: CPT | Performed by: INTERNAL MEDICINE

## 2019-09-30 PROCEDURE — 85025 COMPLETE CBC W/AUTO DIFF WBC: CPT | Performed by: INTERNAL MEDICINE

## 2019-09-30 PROCEDURE — 86140 C-REACTIVE PROTEIN: CPT | Performed by: INTERNAL MEDICINE

## 2019-09-30 PROCEDURE — G9903 PT SCRN TBCO ID AS NON USER: HCPCS | Performed by: INTERNAL MEDICINE

## 2019-09-30 PROCEDURE — 99204 OFFICE O/P NEW MOD 45 MIN: CPT | Performed by: INTERNAL MEDICINE

## 2019-09-30 PROCEDURE — 83540 ASSAY OF IRON: CPT | Performed by: INTERNAL MEDICINE

## 2019-09-30 NOTE — PROGRESS NOTES
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  Patient denies any prior history of malignancy.  Denies any blood in the stool or urine.  Denies any history of DVT or pulmonary embolism.  Denies any fevers or drenching night sweats.  Denies any weight loss recently.  Denies any new lymph node enlargement.      PAST MEDICAL HISTORY:    Past Medical History:   Diagnosis Date   • Angina, class IV (CMS/HCC)    • Anxiety    • Benign paroxysmal positional vertigo    • Carpal tunnel syndrome    • Chronic pain    • Coronary atherosclerosis     hx CABG 2005.  is followed by Dr Kwon   • Depression    • Diabetes mellitus (CMS/HCC)     Type 2, controlled   • Diabetic polyneuropathy (CMS/HCC)    • Elevated cholesterol    • Female stress incontinence    • Gastroparesis    • GERD (gastroesophageal reflux disease)    • Hyperlipidemia    • Hypertension    • Low back pain    • Malaise and fatigue    • Multiple joint pain    • Obesity     Refuses to be weighed   • Otalgia     Both   • Perforation of tympanic membrane     Left   • Postoperative wound infection    • Vitamin D deficiency        PAST SURGICAL HISTORY:  Past Surgical History:   Procedure Laterality Date   • ABDOMINAL SURGERY     • ANGIOPLASTY      coronary   • BREAST BIOPSY Right    • CARDIAC CATHETERIZATION     • CARDIAC CATHETERIZATION N/A 6/20/2017    Procedure: Right Heart Cath;  Surgeon: Can Kwon MD PhD;  Location: [facility] INVASIVE LOCATION;   Service:    • CARPAL TUNNEL RELEASE     • CHOLECYSTECTOMY     • CORONARY ARTERY BYPASS GRAFT  2005   • ENDOSCOPY N/A 10/19/2018    Procedure: ESOPHAGOGASTRODUODENOSCOPY possible dilation;  Surgeon: Julián Maldonado MD;  Location: [facility] ENDOSCOPY;  Service: Gastroenterology   • ENDOSCOPY AND COLONOSCOPY     • FOOT SURGERY      Toes   • GASTRIC BANDING      Revision, laparoscopic   • HYSTERECTOMY     • MOUTH SURGERY     • SALPINGO OOPHORECTOMY     • SHOULDER SURGERY     • SUBTALAR ARTHRODESIS Left 1/16/2019    Procedure: LEFT FOOT HARDWARE REMOVAL, FIFTH METATARSAL , OPEN REDUCTION INTERNAL FIXATION, CALCANEAL OSTEOTOMY;  Surgeon: Ignacio Lord DPM;  Location: [facility] OR;  Service: Podiatry   • TRANSESOPHAGEAL ECHOCARDIOGRAM (LAMONTE)      With color flow       ALLERGIES:    Allergies   Allergen Reactions   • Seroquel [Quetiapine] Anaphylaxis   • Avandia [Rosiglitazone] Swelling   • Morphine And Related Hallucinations   • Oxycodone Hallucinations       SOCIAL HISTORY:   Social History     Tobacco Use   • Smoking status: Never Smoker   • Smokeless tobacco: Never Used   Substance Use Topics   • Alcohol use: No   • Drug use: No       CURRENT MEDICATIONS:    Current Outpatient Medications   Medication Sig Dispense Refill   • ARIPiprazole (ABILIFY) 15 MG tablet Take 1 tablet by mouth Daily. 90 tablet 1   • aspirin 81 MG chewable tablet Chew 81 mg daily.     • atorvastatin (LIPITOR) 40 MG tablet Take 40 mg by mouth Every Night.     • BD SHARPS CONTAINER HOME misc 1 each Take As Directed. 1 each 0   • Blood Glucose Monitoring Suppl (ONE TOUCH ULTRA MINI) w/Device kit USE AS DIRECTED TO CHECK BLOOD SUGAR 1 each 0   • Calcium Citrate-Vitamin D (CITRACAL/VITAMIN D) 250-200 MG-UNIT tablet Take 2 tablets by mouth 2 (two) times a day.     • clopidogrel (PLAVIX) 75 MG tablet Take 75 mg by mouth Daily.     • cyanocobalamin 1000 MCG/ML injection Inject 1 mL into the appropriate muscle as directed by prescriber Every 28  "(Twenty-Eight) Days. 1 mL 0   • gabapentin (NEURONTIN) 400 MG capsule Take 400 mg by mouth 3 (Three) Times a Day.     • GLUCAGON EMERGENCY 1 MG injection USE AS DIRECTED AS NEEDED 1 kit 0   • glucose blood (ONE TOUCH ULTRA TEST) test strip 1 each by Other route 4 (Four) Times a Day. Use as instructed 400 each 1   • hydrochlorothiazide (MICROZIDE) 12.5 MG capsule TAKE 1 CAPSULE BY MOUTH DAILY. 90 capsule 0   • HYDROcodone-acetaminophen (NORCO) 7.5-325 MG per tablet Take 1 tablet by mouth Every 4 (Four) Hours As Needed for Moderate Pain  or Severe Pain . 180 tablet 0   • insulin aspart (novoLOG FLEXPEN) 100 UNIT/ML solution pen-injector sc pen Inject 60 Units under the skin into the appropriate area as directed 3 (Three) Times a Day With Meals.     • insulin aspart (NOVOLOG FLEXPEN) 100 UNIT/ML solution pen-injector sc pen INJECT 60 UNITS BEFORE MEALS 3 TIMES A DAY 5 pen 5   • Insulin Glargine (BASAGLAR KWIKPEN) 100 UNIT/ML injection pen Inject 100 Units under the skin into the appropriate area as directed Every Night. 5 pen 5   • Insulin Pen Needle (B-D ULTRAFINE III SHORT PEN) 31G X 8 MM misc USE TO INJECT 4 TIMES A  each 11   • LORazepam (ATIVAN) 0.5 MG tablet Take 1 tablet by mouth Daily As Needed for Anxiety. 30 tablet 0   • meclizine (ANTIVERT) 25 MG tablet Take 1 tablet by mouth 3 (Three) Times a Day As Needed for dizziness. 90 tablet 6   • metoclopramide (REGLAN) 10 MG tablet Take 10 mg by mouth 4 (Four) Times a Day As Needed.     • metoprolol succinate XL (TOPROL-XL) 25 MG 24 hr tablet TAKE 1 TABLET BY MOUTH DAILY. 31 tablet 6   • mirtazapine (REMERON) 45 MG tablet TAKE 1 TABLET BY MOUTH EVERY NIGHT. 90 tablet 0   • omeprazole (PRILOSEC) 40 MG capsule Take 1 capsule by mouth Daily. 90 capsule 1   • ONE TOUCH ULTRA TEST test strip USE TO TEST SUGAR 4 TIMES A  each 3   • Syringe/Needle, Disp, (LUER LOCK SAFETY SYRINGES) 25G X 5/8\" 3 ML misc 1 each Daily. 100 each 0   • venlafaxine XR (EFFEXOR-XR) " 150 MG 24 hr capsule TAKE 1 CAPSULE BY MOUTH TWICE A DAY 60 capsule 2   • vitamin D (ERGOCALCIFEROL) 64133 units capsule capsule Take 50,000 Units by mouth 1 (One) Time Per Week. sunday     • benzonatate (TESSALON) 200 MG capsule Take 1 capsule by mouth 3 (Three) Times a Day As Needed for Cough. 30 capsule 0   • promethazine-dextromethorphan (PROMETHAZINE-DM) 6.25-15 MG/5ML syrup Take one teaspoon qhs prn cough 120 mL 0   • venlafaxine (EFFEXOR) 100 MG tablet Take 1 tablet by mouth 3 (Three) Times a Day. 90 tablet 5     No current facility-administered medications for this visit.         HOME MEDICATIONS:   Current Outpatient Medications on File Prior to Visit   Medication Sig Dispense Refill   • ARIPiprazole (ABILIFY) 15 MG tablet Take 1 tablet by mouth Daily. 90 tablet 1   • aspirin 81 MG chewable tablet Chew 81 mg daily.     • atorvastatin (LIPITOR) 40 MG tablet Take 40 mg by mouth Every Night.     • BD SHARPS CONTAINER HOME misc 1 each Take As Directed. 1 each 0   • Blood Glucose Monitoring Suppl (ONE TOUCH ULTRA MINI) w/Device kit USE AS DIRECTED TO CHECK BLOOD SUGAR 1 each 0   • Calcium Citrate-Vitamin D (CITRACAL/VITAMIN D) 250-200 MG-UNIT tablet Take 2 tablets by mouth 2 (two) times a day.     • clopidogrel (PLAVIX) 75 MG tablet Take 75 mg by mouth Daily.     • cyanocobalamin 1000 MCG/ML injection Inject 1 mL into the appropriate muscle as directed by prescriber Every 28 (Twenty-Eight) Days. 1 mL 0   • gabapentin (NEURONTIN) 400 MG capsule Take 400 mg by mouth 3 (Three) Times a Day.     • GLUCAGON EMERGENCY 1 MG injection USE AS DIRECTED AS NEEDED 1 kit 0   • glucose blood (ONE TOUCH ULTRA TEST) test strip 1 each by Other route 4 (Four) Times a Day. Use as instructed 400 each 1   • hydrochlorothiazide (MICROZIDE) 12.5 MG capsule TAKE 1 CAPSULE BY MOUTH DAILY. 90 capsule 0   • HYDROcodone-acetaminophen (NORCO) 7.5-325 MG per tablet Take 1 tablet by mouth Every 4 (Four) Hours As Needed for Moderate Pain  or  "Severe Pain . 180 tablet 0   • insulin aspart (novoLOG FLEXPEN) 100 UNIT/ML solution pen-injector sc pen Inject 60 Units under the skin into the appropriate area as directed 3 (Three) Times a Day With Meals.     • insulin aspart (NOVOLOG FLEXPEN) 100 UNIT/ML solution pen-injector sc pen INJECT 60 UNITS BEFORE MEALS 3 TIMES A DAY 5 pen 5   • Insulin Glargine (BASAGLAR KWIKPEN) 100 UNIT/ML injection pen Inject 100 Units under the skin into the appropriate area as directed Every Night. 5 pen 5   • Insulin Pen Needle (B-D ULTRAFINE III SHORT PEN) 31G X 8 MM misc USE TO INJECT 4 TIMES A  each 11   • LORazepam (ATIVAN) 0.5 MG tablet Take 1 tablet by mouth Daily As Needed for Anxiety. 30 tablet 0   • meclizine (ANTIVERT) 25 MG tablet Take 1 tablet by mouth 3 (Three) Times a Day As Needed for dizziness. 90 tablet 6   • metoclopramide (REGLAN) 10 MG tablet Take 10 mg by mouth 4 (Four) Times a Day As Needed.     • metoprolol succinate XL (TOPROL-XL) 25 MG 24 hr tablet TAKE 1 TABLET BY MOUTH DAILY. 31 tablet 6   • mirtazapine (REMERON) 45 MG tablet TAKE 1 TABLET BY MOUTH EVERY NIGHT. 90 tablet 0   • omeprazole (PRILOSEC) 40 MG capsule Take 1 capsule by mouth Daily. 90 capsule 1   • ONE TOUCH ULTRA TEST test strip USE TO TEST SUGAR 4 TIMES A  each 3   • Syringe/Needle, Disp, (LUER LOCK SAFETY SYRINGES) 25G X 5/8\" 3 ML misc 1 each Daily. 100 each 0   • venlafaxine XR (EFFEXOR-XR) 150 MG 24 hr capsule TAKE 1 CAPSULE BY MOUTH TWICE A DAY 60 capsule 2   • vitamin D (ERGOCALCIFEROL) 20036 units capsule capsule Take 50,000 Units by mouth 1 (One) Time Per Week. sunday     • benzonatate (TESSALON) 200 MG capsule Take 1 capsule by mouth 3 (Three) Times a Day As Needed for Cough. 30 capsule 0   • promethazine-dextromethorphan (PROMETHAZINE-DM) 6.25-15 MG/5ML syrup Take one teaspoon qhs prn cough 120 mL 0   • venlafaxine (EFFEXOR) 100 MG tablet Take 1 tablet by mouth 3 (Three) Times a Day. 90 tablet 5     No current " facility-administered medications on file prior to visit.        FAMILY HISTORY:    Family History   Problem Relation Age of Onset   • Diabetes Other    • Heart disease Other    • Hypertension Other    • Heart disease Mother    • Stroke Mother    • Hypertension Mother    • Diabetes Sister    • Heart disease Sister    • Hypertension Sister    • Heart disease Sister    • Diabetes Sister    • Hypertension Sister    • Diabetes Sister    • Diabetes Sister    • Diabetes Sister    • Diabetes Sister        REVIEW OF SYSTEMS:      CONSTITUTIONAL:  Complains of fatigue.  Admits to 45 pound of weight gain in last 6 years.  Denies any fever, chills .     EYES: No visual disturbances. No discharge. No new lesions    ENMT:  No epistaxis, mouth sores or difficulty swallowing.    RESPIRATORY:  No new shortness of breath. No new cough or hemoptysis.    CARDIOVASCULAR:  No chest pain or palpitations.    GASTROINTESTINAL: Complains of intermittent nausea and vomiting due to gastroparesis.  No abdominal pain nausea, vomiting or blood in the stool.    GENITOURINARY: No Dysuria or Hematuria.    MUSCULOSKELETAL: Complains of chronic arthritic pain affecting back and other joints.    LYMPHATICS:  Denies any abnormal swollen glands anywhere in the body.    NEUROLOGICAL : Complains of tingling and numbness affecting upper and lower extremity. No headache or dizziness. No seizures or balance problems.    SKIN: No new skin lesions.    ENDOCRINE : No new heat or cold intolerance. No new polyuria . No polydipsia.        PHYSICAL EXAMINATION:      VITAL SIGNS:  BP 98/65   Pulse 79   Temp 97.2 °F (36.2 °C)   Resp 16   Wt 116 kg (256 lb)   SpO2 97%   BMI 38.92 kg/m²       09/30/19  1528   Weight: 116 kg (256 lb)       ECOG performance status: 2    CONSTITUTIONAL:  Not in any distress.    EYES: Mild conjunctival Pallor. No Icterus. No Pterygium. Extraocular Movements intact.No ptosis.    ENMT:  Normocephalic, Atraumatic.No Facial Asymmetry  noted.    NECK:  No adenopathy.Trachea midline. NO JVD.    RESPIRATORY:  Fair air entry bilateral. No rhonchi or wheezing.Fair respiratory effort.    CARDIOVASCULAR:  S1, S2. Regular rate and rhythm. No murmur or gallop appreciated.    ABDOMEN:  Soft, obese, nontender. Bowel sounds present in all four quadrants.  No Hepatosplenomegaly appreciated.    MUSCULOSKELETAL:  No edema.No Calf Tenderness.Decreased range of motion.    NEUROLOGIC:    No  Motor  deficit appreciated. Cranial Nerves 2-12 grossly intact.    SKIN : No new skin lesion identified. Skin is warm and dry to touch.    LYMPHATICS: No new enlarged lymph nodes in neck or supraclavicular area.    PSYCHIATRY: Alert, awake and oriented ×3.          DIAGNOSTIC DATA:    WBC   Date Value Ref Range Status   09/24/2019 12.49 (H) 3.40 - 10.80 10*3/mm3 Final     RBC   Date Value Ref Range Status   09/24/2019 4.93 3.77 - 5.28 10*6/mm3 Final     Hemoglobin   Date Value Ref Range Status   09/24/2019 13.6 12.0 - 15.9 g/dL Final     Hematocrit   Date Value Ref Range Status   09/24/2019 41.4 34.0 - 46.6 % Final     MCV   Date Value Ref Range Status   09/24/2019 84.0 79.0 - 97.0 fL Final     MCH   Date Value Ref Range Status   09/24/2019 27.6 26.6 - 33.0 pg Final     MCHC   Date Value Ref Range Status   09/24/2019 32.9 31.5 - 35.7 g/dL Final     RDW   Date Value Ref Range Status   09/24/2019 14.5 12.3 - 15.4 % Final     RDW-SD   Date Value Ref Range Status   09/24/2019 43.6 37.0 - 54.0 fl Final     MPV   Date Value Ref Range Status   09/24/2019 10.2 6.0 - 12.0 fL Final     Platelets   Date Value Ref Range Status   09/24/2019 506 (H) 140 - 450 10*3/mm3 Final     Neutrophil %   Date Value Ref Range Status   09/24/2019 59.0 42.7 - 76.0 % Final     Lymphocyte %   Date Value Ref Range Status   09/24/2019 28.1 19.6 - 45.3 % Final     Monocyte %   Date Value Ref Range Status   09/24/2019 7.4 5.0 - 12.0 % Final     Eosinophil %   Date Value Ref Range Status   09/24/2019 4.3 0.3 -  6.2 % Final     Basophil %   Date Value Ref Range Status   09/24/2019 0.6 0.0 - 1.5 % Final     Immature Grans %   Date Value Ref Range Status   09/24/2019 0.6 (H) 0.0 - 0.5 % Final     Neutrophils, Absolute   Date Value Ref Range Status   09/24/2019 7.36 (H) 1.70 - 7.00 10*3/mm3 Final     Lymphocytes, Absolute   Date Value Ref Range Status   09/24/2019 3.51 (H) 0.70 - 3.10 10*3/mm3 Final     Monocytes, Absolute   Date Value Ref Range Status   09/24/2019 0.93 (H) 0.10 - 0.90 10*3/mm3 Final     Eosinophils, Absolute   Date Value Ref Range Status   09/24/2019 0.54 (H) 0.00 - 0.40 10*3/mm3 Final     Basophils, Absolute   Date Value Ref Range Status   09/24/2019 0.08 0.00 - 0.20 10*3/mm3 Final     Immature Grans, Absolute   Date Value Ref Range Status   09/24/2019 0.07 (H) 0.00 - 0.05 10*3/mm3 Final     nRBC   Date Value Ref Range Status   09/24/2019 0.0 0.0 - 0.2 /100 WBC Final     Glucose   Date Value Ref Range Status   09/24/2019 69 65 - 99 mg/dL Final     Glucose, Arterial   Date Value Ref Range Status   10/14/2017 155 mmol/L Final     Sodium   Date Value Ref Range Status   09/24/2019 145 136 - 145 mmol/L Final     Potassium   Date Value Ref Range Status   09/24/2019 3.8 3.5 - 5.2 mmol/L Final     CO2   Date Value Ref Range Status   09/24/2019 29.8 (H) 22.0 - 29.0 mmol/L Final     Chloride   Date Value Ref Range Status   09/24/2019 99 98 - 107 mmol/L Final     Anion Gap   Date Value Ref Range Status   09/24/2019 16.2 (H) 5.0 - 15.0 mmol/L Final     Creatinine   Date Value Ref Range Status   09/24/2019 1.11 (H) 0.57 - 1.00 mg/dL Final     BUN   Date Value Ref Range Status   09/24/2019 13 6 - 20 mg/dL Final     BUN/Creatinine Ratio   Date Value Ref Range Status   09/24/2019 11.7 7.0 - 25.0 Final     Calcium   Date Value Ref Range Status   09/24/2019 9.2 8.6 - 10.5 mg/dL Final     eGFR Non  Amer   Date Value Ref Range Status   09/24/2019 51 (L) >60 mL/min/1.73 Final     Alkaline Phosphatase   Date Value Ref  Range Status   09/24/2019 141 (H) 39 - 117 U/L Final     Total Protein   Date Value Ref Range Status   09/24/2019 7.7 6.0 - 8.5 g/dL Final     ALT (SGPT)   Date Value Ref Range Status   09/24/2019 11 1 - 33 U/L Final     AST (SGOT)   Date Value Ref Range Status   09/24/2019 17 1 - 32 U/L Final     Total Bilirubin   Date Value Ref Range Status   09/24/2019 0.3 0.2 - 1.2 mg/dL Final     Albumin   Date Value Ref Range Status   09/24/2019 4.40 3.50 - 5.20 g/dL Final     Globulin   Date Value Ref Range Status   09/24/2019 3.3 gm/dL Final     Lab Results   Component Value Date    IRON 20 (L) 10/19/2017    [id number] 09/06/2019    FOLATE 9.30 07/21/2018     No results found for: , LABCA2, AFPTM, HCGQUANT, , CHROMGRNA, [id number], CEA, REFLABREPO]      ASSESSMENT AND PLAN:      1.  Thrombocytosis:  - Patient has intermittent thrombocytosis since 2015.  -Most recent platelet count checked on September 24, 2019 is elevated at 506,000.  - Differential diagnosis for chronic intermittent thrombocytosis includes reactive cause like infection versus inflammation versus nutritional deficiency like iron deficiency versus myeloproliferative neoplasm.  - We will do blood work today consisting of CBC with differential, LDH, ESR, C-reactive protein, Tony 2 mutation, CANDICE R and MPL mutation to rule out any myeloproliferative neoplasm.  - We will also check iron studies to rule out any overt iron deficiency that can cause thrombocytosis.  -We will ask patient to return to clinic in 2 weeks to go over the results of blood work and further recommendation.    2.  Leukocytosis:  - Patient has persistent leukocytosis since 2015.  -Differential diagnosis for chronic leukocytosis includes infectious etiology versus inflammation versus uncontrolled diabetes mellitus versus bone marrow pathology.  - We will do blood work today consisting of CBC with differential LDH ESR and C-reactive protein.    3.  Diabetes mellitus with  neuropathy    4.  Coronary artery disease status post CABG    5.  Hypertension    6.  Dyslipidemia    7.  Status post hysterectomy    8.  Health maintenance: Patient does not smoke.  Had a mammogram last year.  Had a colonoscopy around 2017.  Had a hysterectomy and does not need Pap smear    9.  Advance care planning: Patient has a living will on chart    10.  Pain assessment:  -Patient denies any pain today        Thank you for this consultation.        Uziel Alonso MD  9/30/2019  3:38 PM          EMR Dragon/Transcription disclaimer:   Much of this encounter note is an electronic transcription/translation of spoken language to printed text. The electronic translation of spoken language may permit erroneous, or at times, nonsensical words or phrases to be inadvertently transcribed; Although I have reviewed the note for such errors, some may still exist.

## 2019-10-03 LAB — CALR EXON 9 MUT ANL BLD/T: NORMAL

## 2019-10-07 ENCOUNTER — TELEPHONE (OUTPATIENT)
Dept: PODIATRY | Facility: CLINIC | Age: 59
End: 2019-10-07

## 2019-10-07 ENCOUNTER — APPOINTMENT (OUTPATIENT)
Dept: PREADMISSION TESTING | Facility: HOSPITAL | Age: 59
End: 2019-10-07

## 2019-10-07 VITALS
SYSTOLIC BLOOD PRESSURE: 152 MMHG | WEIGHT: 257 LBS | DIASTOLIC BLOOD PRESSURE: 74 MMHG | RESPIRATION RATE: 14 BRPM | OXYGEN SATURATION: 98 % | HEIGHT: 68 IN | HEART RATE: 91 BPM | BODY MASS INDEX: 38.95 KG/M2

## 2019-10-07 RX ORDER — VENLAFAXINE 100 MG/1
100 TABLET ORAL 2 TIMES DAILY
COMMUNITY
End: 2019-10-25 | Stop reason: SDUPTHER

## 2019-10-07 RX ORDER — HYDROCHLOROTHIAZIDE 12.5 MG/1
12.5 TABLET ORAL DAILY
Status: ON HOLD | COMMUNITY
End: 2021-05-18 | Stop reason: SDUPTHER

## 2019-10-07 RX ORDER — SODIUM CHLORIDE 9 MG/ML
1000 INJECTION, SOLUTION INTRAVENOUS CONTINUOUS
Status: CANCELLED | OUTPATIENT
Start: 2019-10-16

## 2019-10-07 NOTE — TELEPHONE ENCOUNTER
JOHN:    [facility] CALLED AND STATED THAT [facility] IS SCHEDULED FOR SURGERY BUT DID NOT GET INSTRUCTIONS FOR ASPIRIN AND BLOOD THINNER. PLEASE CALL [facility] AND GIVE INSTRUCTIONS.

## 2019-10-09 NOTE — TELEPHONE ENCOUNTER
PATIENT CALLED IN REGARDS TO HER MESSAGE SHE LEFT ON 10/07, RELAYED MESSAGE STATING THAT [facility] WILL CALL HER TOMORROW AFTER SPEAKING WITH DR LOPEZ BECAUSE THEY ARE OUT OF OFFICE TODAY

## 2019-10-10 LAB — REF LAB TEST METHOD: NORMAL

## 2019-10-11 LAB — REF LAB TEST METHOD: NORMAL

## 2019-10-14 ENCOUNTER — OFFICE VISIT (OUTPATIENT)
Dept: ONCOLOGY | Facility: CLINIC | Age: 59
End: 2019-10-14

## 2019-10-14 ENCOUNTER — TELEPHONE (OUTPATIENT)
Dept: ONCOLOGY | Facility: HOSPITAL | Age: 59
End: 2019-10-14

## 2019-10-14 VITALS
WEIGHT: 259 LBS | TEMPERATURE: 98.2 F | HEART RATE: 75 BPM | BODY MASS INDEX: 39.38 KG/M2 | DIASTOLIC BLOOD PRESSURE: 79 MMHG | SYSTOLIC BLOOD PRESSURE: 140 MMHG

## 2019-10-14 DIAGNOSIS — D75.839 THROMBOCYTOSIS: ICD-10-CM

## 2019-10-14 DIAGNOSIS — D72.828 OTHER ELEVATED WHITE BLOOD CELL (WBC) COUNT: Primary | ICD-10-CM

## 2019-10-14 DIAGNOSIS — M54.41 CHRONIC RIGHT-SIDED LOW BACK PAIN WITH RIGHT-SIDED SCIATICA: Chronic | ICD-10-CM

## 2019-10-14 DIAGNOSIS — K31.84 GASTROPARESIS: ICD-10-CM

## 2019-10-14 DIAGNOSIS — G89.29 CHRONIC RIGHT-SIDED LOW BACK PAIN WITH RIGHT-SIDED SCIATICA: Chronic | ICD-10-CM

## 2019-10-14 DIAGNOSIS — E61.1 IRON DEFICIENCY: ICD-10-CM

## 2019-10-14 DIAGNOSIS — T45.4X5A ADVERSE EFFECT OF IRON, INITIAL ENCOUNTER: ICD-10-CM

## 2019-10-14 PROCEDURE — G0463 HOSPITAL OUTPT CLINIC VISIT: HCPCS | Performed by: INTERNAL MEDICINE

## 2019-10-14 PROCEDURE — 1157F ADVNC CARE PLAN IN RCRD: CPT | Performed by: INTERNAL MEDICINE

## 2019-10-14 PROCEDURE — G8731 PAIN NEG NO PLAN: HCPCS | Performed by: INTERNAL MEDICINE

## 2019-10-14 PROCEDURE — G9903 PT SCRN TBCO ID AS NON USER: HCPCS | Performed by: INTERNAL MEDICINE

## 2019-10-14 PROCEDURE — 99214 OFFICE O/P EST MOD 30 MIN: CPT | Performed by: INTERNAL MEDICINE

## 2019-10-14 RX ORDER — SODIUM CHLORIDE 9 MG/ML
250 INJECTION, SOLUTION INTRAVENOUS ONCE
Status: CANCELLED | OUTPATIENT
Start: 2019-10-14

## 2019-10-14 RX ORDER — HYDROCODONE BITARTRATE AND ACETAMINOPHEN 7.5; 325 MG/1; MG/1
1 TABLET ORAL EVERY 4 HOURS PRN
Qty: 180 TABLET | Refills: 0 | Status: SHIPPED | OUTPATIENT
Start: 2019-10-14 | End: 2019-11-11 | Stop reason: SDUPTHER

## 2019-10-14 RX ORDER — FAMOTIDINE 10 MG/ML
20 INJECTION, SOLUTION INTRAVENOUS ONCE
Status: CANCELLED | OUTPATIENT
Start: 2019-10-14

## 2019-10-14 RX ORDER — HYDROCODONE BITARTRATE AND ACETAMINOPHEN 7.5; 325 MG/1; MG/1
1 TABLET ORAL EVERY 4 HOURS PRN
Qty: 180 TABLET | Refills: 0 | Status: CANCELLED | OUTPATIENT
Start: 2019-10-14

## 2019-10-14 RX ORDER — ACETAMINOPHEN 325 MG/1
650 TABLET ORAL ONCE
Status: CANCELLED | OUTPATIENT
Start: 2019-10-14

## 2019-10-14 RX ORDER — DIPHENHYDRAMINE HCL 25 MG
25 CAPSULE ORAL ONCE
Status: CANCELLED | OUTPATIENT
Start: 2019-10-14

## 2019-10-14 NOTE — TELEPHONE ENCOUNTER
----- Message from Uziel Alonso MD sent at 10/14/2019  3:13 PM CDT -----  Contact: patient  Yes.  Feraheme can be done after foot surgery.  Thank you  ----- Message -----  From: William Gamboa RN  Sent: 10/14/2019   2:26 PM  To: Uziel Alonso MD    See note, please advise.     ----- Message -----  From: Gay Jones  Sent: 10/14/2019   1:37 PM  To: [facility] [place]    Patient called and stated that she saw doc today and was told her iron was low.  She is scheduled for foot surgery on Wednesday, and is wondering is that iron level was ok to go ahead with the procedure.  Please call back at [phone] and can leave message on machine.

## 2019-10-14 NOTE — PATIENT INSTRUCTIONS
Ferumoxytol injection  What is this medicine?  FERUMOXYTOL is an iron complex. Iron is used to make healthy red blood cells, which carry oxygen and nutrients throughout the body. This medicine is used to treat iron deficiency anemia.  This medicine may be used for other purposes; ask your health care provider or pharmacist if you have questions.  COMMON BRAND NAME(S): Feraheme  What should I tell my health care provider before I take this medicine?  They need to know if you have any of these conditions:  -anemia not caused by low iron levels  -high levels of iron in the blood  -magnetic resonance imaging (MRI) test scheduled  -an unusual or allergic reaction to iron, other medicines, foods, dyes, or preservatives  -pregnant or trying to get pregnant  -breast-feeding  How should I use this medicine?  This medicine is for injection into a vein. It is given by a health care professional in a hospital or clinic setting.  Talk to your pediatrician regarding the use of this medicine in children. Special care may be needed.  Overdosage: If you think you have taken too much of this medicine contact a poison control center or emergency room at once.  NOTE: This medicine is only for you. Do not share this medicine with others.  What if I miss a dose?  It is important not to miss your dose. Call your doctor or health care professional if you are unable to keep an appointment.  What may interact with this medicine?  This medicine may interact with the following medications:  -other iron products  This list may not describe all possible interactions. Give your health care provider a list of all the medicines, herbs, non-prescription drugs, or dietary supplements you use. Also tell them if you smoke, drink alcohol, or use illegal drugs. Some items may interact with your medicine.  What should I watch for while using this medicine?  Visit your doctor or healthcare professional regularly. Tell your doctor or healthcare professional  if your symptoms do not start to get better or if they get worse. You may need blood work done while you are taking this medicine.  You may need to follow a special diet. Talk to your doctor. Foods that contain iron include: whole grains/cereals, dried fruits, beans, or peas, leafy green vegetables, and organ meats (liver, kidney).  What side effects may I notice from receiving this medicine?  Side effects that you should report to your doctor or health care professional as soon as possible:  -allergic reactions like skin rash, itching or hives, swelling of the face, lips, or tongue  -breathing problems  -changes in blood pressure  -feeling faint or lightheaded, falls  -fever or chills  -flushing, sweating, or hot feelings  -swelling of the ankles or feet  Side effects that usually do not require medical attention (report to your doctor or health care professional if they continue or are bothersome):  -diarrhea  -headache  -nausea, vomiting  -stomach pain  This list may not describe all possible side effects. Call your doctor for medical advice about side effects. You may report side effects to FDA at 5-986-FDA-2988.  Where should I keep my medicine?  This drug is given in a hospital or clinic and will not be stored at home.  NOTE: This sheet is a summary. It may not cover all possible information. If you have questions about this medicine, talk to your doctor, pharmacist, or health care provider.  © 2019 [employer] Standard (2018-02-05 20:21:10)

## 2019-10-14 NOTE — PROGRESS NOTES
DATE OF VISIT: 10/14/2019      REASON FOR VISIT: Leukocytosis, thrombocytosis, iron deficiency      HISTORY OF PRESENT ILLNESS:    59-year-old female with medical problem consisting of coronary artery disease status post CABG, type 2 diabetes mellitus with neuropathy affecting upper and lower extremity, gastroparesis, dyslipidemia, obesity status post lap banding and reversal around 2014 was initially seen in consultation on September 30, 2019 for evaluation of leukocytosis and thrombocytosis.  Patient had extensive blood work done.  She is here to discuss the results and further recommendation.  Denies any bleeding.  Denies any new lymph node enlargement.    PAST MEDICAL HISTORY:    Past Medical History:   Diagnosis Date   • Angina, class IV (CMS/HCC)    • Anxiety    • Anxiety and depression    • Benign paroxysmal positional vertigo    • Bladder disorder     has bladder stimulator   • Carpal tunnel syndrome    • Chronic pain    • Coronary atherosclerosis     hx CABG 2005.  is followed by Dr Kwon   • Depression    • Diabetes mellitus (CMS/HCC)     Type 2, controlled   • Diabetic polyneuropathy (CMS/HCC)    • Elevated cholesterol    • Female stress incontinence    • Foot pain, left    • Full dentures    • Gastroparesis    • GERD (gastroesophageal reflux disease)    • Hyperlipidemia    • Hypertension    • Low back pain    • Malaise and fatigue    • Multiple joint pain    • Obesity     Refuses to be weighed   • Otalgia     Both   • Perforation of tympanic membrane     Left   • Postoperative wound infection    • Vitamin D deficiency    • Wears glasses     reading       SOCIAL HISTORY:    Social History     Tobacco Use   • Smoking status: Never Smoker   • Smokeless tobacco: Never Used   Substance Use Topics   • Alcohol use: No   • Drug use: No       Surgical History :  Past Surgical History:   Procedure Laterality Date   • ABDOMINAL SURGERY     • ANGIOPLASTY      coronary   • BREAST BIOPSY Right    • CARDIAC  CATHETERIZATION     • CARDIAC CATHETERIZATION N/A 6/20/2017    Procedure: Right Heart Cath;  Surgeon: Can Kwon MD PhD;  Location: [facility] CATH INVASIVE LOCATION;  Service:    • CARPAL TUNNEL RELEASE     • CHOLECYSTECTOMY     • CORONARY ARTERY BYPASS GRAFT  2005   • ENDOSCOPY N/A 10/19/2018    Procedure: ESOPHAGOGASTRODUODENOSCOPY possible dilation;  Surgeon: Julián Maldonado MD;  Location: [facility] ENDOSCOPY;  Service: Gastroenterology   • ENDOSCOPY AND COLONOSCOPY     • FOOT SURGERY      Toes   • GASTRIC BANDING      Revision, laparoscopic   • HYSTERECTOMY     • MOUTH SURGERY     • SALPINGO OOPHORECTOMY     • SHOULDER SURGERY     • SUBTALAR ARTHRODESIS Left 1/16/2019    Procedure: LEFT FOOT HARDWARE REMOVAL, FIFTH METATARSAL , OPEN REDUCTION INTERNAL FIXATION, CALCANEAL OSTEOTOMY;  Surgeon: Ignacio Lord DPM;  Location: [facility] OR;  Service: Podiatry   • TRANSESOPHAGEAL ECHOCARDIOGRAM (LAMONTE)      With color flow       ALLERGIES:    Allergies   Allergen Reactions   • Seroquel [Quetiapine] Anaphylaxis   • Avandia [Rosiglitazone] Swelling   • Morphine And Related Hallucinations   • Oxycodone Hallucinations         FAMILY HISTORY:  Family History   Problem Relation Age of Onset   • Diabetes Other    • Heart disease Other    • Hypertension Other    • Heart disease Mother    • Stroke Mother    • Hypertension Mother    • Diabetes Sister    • Heart disease Sister    • Hypertension Sister    • Heart disease Sister    • Diabetes Sister    • Hypertension Sister    • Diabetes Sister    • Diabetes Sister    • Diabetes Sister    • Diabetes Sister            REVIEW OF SYSTEMS:      CONSTITUTIONAL:  Complains of fatigue.  Admits to 45 pound of weight gain in last 6 years.  Denies any fever, chills .      EYES: No visual disturbances. No discharge. No new lesions     ENMT:  No epistaxis, mouth sores or difficulty swallowing.     RESPIRATORY:  No new shortness of breath. No new cough or hemoptysis.     CARDIOVASCULAR:   No chest pain or palpitations.     GASTROINTESTINAL: Complains of intermittent nausea and vomiting due to gastroparesis.  No abdominal pain nausea, vomiting or blood in the stool.     GENITOURINARY: No Dysuria or Hematuria.     MUSCULOSKELETAL: Complains of chronic arthritic pain affecting back and other joints.     LYMPHATICS:  Denies any abnormal swollen glands anywhere in the body.     NEUROLOGICAL : Complains of tingling and numbness affecting upper and lower extremity. No headache or dizziness. No seizures or balance problems.     SKIN: No new skin lesions.     ENDOCRINE : No new heat or cold intolerance. No new polyuria . No polydipsia.            PHYSICAL EXAMINATION:      VITAL SIGNS:  /79   Pulse 75   Temp 98.2 °F (36.8 °C)   Wt 117 kg (259 lb)   BMI 39.38 kg/m²       10/14/19  1116   Weight: 117 kg (259 lb)         ECOG performance status: 2     CONSTITUTIONAL:  Not in any distress.     EYES: Mild conjunctival Pallor. No Icterus. No Pterygium. Extraocular Movements intact.No ptosis.     ENMT:  Normocephalic, Atraumatic.No Facial Asymmetry noted.     NECK:  No adenopathy.Trachea midline. NO JVD.     RESPIRATORY:  Fair air entry bilateral. No rhonchi or wheezing.Fair respiratory effort.     CARDIOVASCULAR:  S1, S2. Regular rate and rhythm. No murmur or gallop appreciated.     ABDOMEN:  Soft, obese, nontender. Bowel sounds present in all four quadrants.  No Hepatosplenomegaly appreciated.     MUSCULOSKELETAL:  No edema.No Calf Tenderness.Decreased range of motion.     NEUROLOGIC:    No  Motor  deficit appreciated. Cranial Nerves 2-12 grossly intact.     SKIN : No new skin lesion identified. Skin is warm and dry to touch.     LYMPHATICS: No new enlarged lymph nodes in neck or supraclavicular area.     PSYCHIATRY: Alert, awake and oriented ×3.      DIAGNOSTIC DATA:    Glucose   Date Value Ref Range Status   09/24/2019 69 65 - 99 mg/dL Final     Glucose, Arterial   Date Value Ref Range Status    10/14/2017 155 mmol/L Final     Sodium   Date Value Ref Range Status   09/24/2019 145 136 - 145 mmol/L Final     Potassium   Date Value Ref Range Status   09/24/2019 3.8 3.5 - 5.2 mmol/L Final     CO2   Date Value Ref Range Status   09/24/2019 29.8 (H) 22.0 - 29.0 mmol/L Final     Chloride   Date Value Ref Range Status   09/24/2019 99 98 - 107 mmol/L Final     Anion Gap   Date Value Ref Range Status   09/24/2019 16.2 (H) 5.0 - 15.0 mmol/L Final     Creatinine   Date Value Ref Range Status   09/24/2019 1.11 (H) 0.57 - 1.00 mg/dL Final     BUN   Date Value Ref Range Status   09/24/2019 13 6 - 20 mg/dL Final     BUN/Creatinine Ratio   Date Value Ref Range Status   09/24/2019 11.7 7.0 - 25.0 Final     Calcium   Date Value Ref Range Status   09/24/2019 9.2 8.6 - 10.5 mg/dL Final     eGFR Non  Amer   Date Value Ref Range Status   09/24/2019 51 (L) >60 mL/min/1.73 Final     Alkaline Phosphatase   Date Value Ref Range Status   09/24/2019 141 (H) 39 - 117 U/L Final     Total Protein   Date Value Ref Range Status   09/24/2019 7.7 6.0 - 8.5 g/dL Final     ALT (SGPT)   Date Value Ref Range Status   09/24/2019 11 1 - 33 U/L Final     AST (SGOT)   Date Value Ref Range Status   09/24/2019 17 1 - 32 U/L Final     Total Bilirubin   Date Value Ref Range Status   09/24/2019 0.3 0.2 - 1.2 mg/dL Final     Albumin   Date Value Ref Range Status   09/24/2019 4.40 3.50 - 5.20 g/dL Final     Globulin   Date Value Ref Range Status   09/24/2019 3.3 gm/dL Final     Lab Results   Component Value Date    WBC 13.12 (H) 09/30/2019    HGB 13.1 09/30/2019    HCT 40.4 09/30/2019    MCV 84.3 09/30/2019     09/30/2019     Lab Results   Component Value Date    NEUTROABS 8.29 (H) 09/30/2019    IRON 41 09/30/2019    TIBC 377 09/30/2019    LABIRON 11 (L) 09/30/2019    FERRITIN 46.35 09/30/2019    HSCLSNAP99 757 09/06/2019    FOLATE 9.30 07/21/2018     Lab Results   Component Value Date    REFLABREPO SEE ATTACHED REPORT 09/30/2019     REFLABREPO SEE ATTACHED REPORT 09/30/2019       Lactate Dehydrogenase   Component  Ref Range & Units 2wk ago   LDH  135 - 214 U/L 192    Resulting Agency [facility]         Specimen Collected: 09/30/19 15:59 Last Resulted: 09/30/19 16:42               Tony 2 mutation including exon 12, CALR, MPL mutation was negative on peripheral blood on September 30, 2019.          ASSESSMENT AND PLAN:      1.  Thrombocytosis:  - Patient has intermittent thrombocytosis since 2015.  -Most recent platelet count checked on September 30, 2019 is normal at 407,000.  - Patient does have iron deficiency with low iron saturation of 11% on anemia work-up.  Contribute to thrombocytosis.  - Extensive work-up including Tony 2 mutation with exon 12, CALR mutation, MPL mutation done on September 30, 2019 is negative.  - At this point will continue with clinical monitoring.  Will ask patient to return to clinic in 3 months with repeat CBC and anemia work-up to be done    2.  Leukocytosis:  - Patient has persistent leukocytosis since 2015.  -White blood cell count was elevated at 13,000.  - CANDICE R mutation, MPL mutation, Tony 2 mutation including exon 12 were negative.  At this point will continue with clinical monitoring.  - If patient has any worsening leukocytosis or thrombocytosis in future, she will need bone marrow biopsy which was discussed with patient.    3.  Iron deficiency:  -Patient is found to have iron deficiency with iron saturation of only 11% and ferritin of 46.  Patient states in the past she is not able to tolerate iron by mouth.  -We will start patient on intravenous Feraheme starting next week.  Side effect of intravenous Feraheme including allergic reaction were discussed with patient today.     4.  Diabetes mellitus with neuropathy     5.  Coronary artery disease status post CABG     6.  Hypertension        7.  Status post hysterectomy     8.  Health maintenance: Patient does not smoke.  Had a mammogram last year.  Had a  colonoscopy around 2017.  Had a hysterectomy and does not need Pap smear     9.  Advance care planning: Patient has a living will on chart     10.  Pain assessment:  -Patient denies any pain today     Uziel Alonso MD  10/14/2019  11:28 AM        EMR Dragon/Transcription disclaimer:   Much of this encounter note is an electronic transcription/translation of spoken language to printed text. The electronic translation of spoken language may permit erroneous, or at times, nonsensical words or phrases to be inadvertently transcribed; Although I have reviewed the note for such errors, some may still exist.

## 2019-10-16 ENCOUNTER — HOSPITAL ENCOUNTER (OUTPATIENT)
Facility: HOSPITAL | Age: 59
Discharge: HOME | End: 2019-10-18
Attending: PODIATRIST | Admitting: PODIATRIST

## 2019-10-16 ENCOUNTER — ANESTHESIA EVENT (OUTPATIENT)
Dept: PERIOP | Facility: HOSPITAL | Age: 59
End: 2019-10-16

## 2019-10-16 ENCOUNTER — ANESTHESIA (OUTPATIENT)
Dept: PERIOP | Facility: HOSPITAL | Age: 59
End: 2019-10-16

## 2019-10-16 ENCOUNTER — APPOINTMENT (OUTPATIENT)
Dept: GENERAL RADIOLOGY | Facility: HOSPITAL | Age: 59
End: 2019-10-16

## 2019-10-16 DIAGNOSIS — S92.352K DISPLACED FRACTURE OF FIFTH METATARSAL BONE, LEFT FOOT, SUBSEQUENT ENCOUNTER FOR FRACTURE WITH NONUNION: ICD-10-CM

## 2019-10-16 DIAGNOSIS — Z74.09 IMPAIRED PHYSICAL MOBILITY: ICD-10-CM

## 2019-10-16 DIAGNOSIS — T85.848A PAIN FROM IMPLANTED HARDWARE, INITIAL ENCOUNTER: ICD-10-CM

## 2019-10-16 DIAGNOSIS — Q66.70 PES CAVUS: ICD-10-CM

## 2019-10-16 PROBLEM — M21.172: Status: ACTIVE | Noted: 2019-10-16

## 2019-10-16 LAB
GLUCOSE BLDC GLUCOMTR-MCNC: 114 MG/DL (ref 70–130)
GLUCOSE BLDC GLUCOMTR-MCNC: 209 MG/DL (ref 70–130)
GLUCOSE BLDC GLUCOMTR-MCNC: 209 MG/DL (ref 70–130)
GLUCOSE BLDC GLUCOMTR-MCNC: 289 MG/DL (ref 70–130)

## 2019-10-16 PROCEDURE — C1713 ANCHOR/SCREW BN/BN,TIS/BN: HCPCS | Performed by: PODIATRIST

## 2019-10-16 PROCEDURE — 25010000002 SUCCINYLCHOLINE PER 20 MG: Performed by: NURSE ANESTHETIST, CERTIFIED REGISTERED

## 2019-10-16 PROCEDURE — 20900 REMOVAL OF BONE FOR GRAFT: CPT | Performed by: PODIATRIST

## 2019-10-16 PROCEDURE — C1769 GUIDE WIRE: HCPCS | Performed by: PODIATRIST

## 2019-10-16 PROCEDURE — 25010000002 ONDANSETRON PER 1 MG: Performed by: NURSE ANESTHETIST, CERTIFIED REGISTERED

## 2019-10-16 PROCEDURE — 25010000002 HYDROMORPHONE 1 MG/ML SOLUTION: Performed by: PODIATRIST

## 2019-10-16 PROCEDURE — 25010000002 HYDROMORPHONE 1 MG/ML SOLUTION: Performed by: NURSE ANESTHETIST, CERTIFIED REGISTERED

## 2019-10-16 PROCEDURE — 25010000002 PROPOFOL 10 MG/ML EMULSION: Performed by: NURSE ANESTHETIST, CERTIFIED REGISTERED

## 2019-10-16 PROCEDURE — 82962 GLUCOSE BLOOD TEST: CPT

## 2019-10-16 PROCEDURE — 25010000002 DEXAMETHASONE PER 1 MG: Performed by: NURSE ANESTHETIST, CERTIFIED REGISTERED

## 2019-10-16 PROCEDURE — 20680 REMOVAL OF IMPLANT DEEP: CPT | Performed by: PODIATRIST

## 2019-10-16 PROCEDURE — 27687 REVISION OF CALF TENDON: CPT | Performed by: PODIATRIST

## 2019-10-16 PROCEDURE — 27654 REPAIR OF ACHILLES TENDON: CPT | Performed by: PODIATRIST

## 2019-10-16 PROCEDURE — 76000 FLUOROSCOPY <1 HR PHYS/QHP: CPT

## 2019-10-16 PROCEDURE — 63710000001 INSULIN ASPART PER 5 UNITS: Performed by: PODIATRIST

## 2019-10-16 PROCEDURE — 25010000003 LIDOCAINE 1 % SOLUTION: Performed by: NURSE ANESTHETIST, CERTIFIED REGISTERED

## 2019-10-16 PROCEDURE — G0378 HOSPITAL OBSERVATION PER HR: HCPCS

## 2019-10-16 PROCEDURE — 88300 SURGICAL PATH GROSS: CPT | Performed by: PATHOLOGY

## 2019-10-16 PROCEDURE — 88300 SURGICAL PATH GROSS: CPT | Performed by: PODIATRIST

## 2019-10-16 PROCEDURE — 28725 ARTHRODESIS SUBTALAR: CPT | Performed by: PODIATRIST

## 2019-10-16 PROCEDURE — 25010000002 MIDAZOLAM PER 1 MG: Performed by: NURSE ANESTHETIST, CERTIFIED REGISTERED

## 2019-10-16 PROCEDURE — 25010000002 FENTANYL CITRATE (PF) 100 MCG/2ML SOLUTION: Performed by: NURSE ANESTHETIST, CERTIFIED REGISTERED

## 2019-10-16 DEVICE — IMPLANTABLE DEVICE: Type: IMPLANTABLE DEVICE | Status: FUNCTIONAL

## 2019-10-16 DEVICE — SYS SUT/ANCH ACHILLES SPEEDBRIDGE BIOCOMP W/JUMPSTART: Type: IMPLANTABLE DEVICE | Site: HEEL | Status: FUNCTIONAL

## 2019-10-16 RX ORDER — MECLIZINE HYDROCHLORIDE 25 MG/1
25 TABLET ORAL 3 TIMES DAILY PRN
Status: DISCONTINUED | OUTPATIENT
Start: 2019-10-16 | End: 2019-10-18 | Stop reason: HOSPADM

## 2019-10-16 RX ORDER — HYDROCODONE BITARTRATE AND ACETAMINOPHEN 7.5; 325 MG/1; MG/1
1 TABLET ORAL EVERY 4 HOURS PRN
Status: DISCONTINUED | OUTPATIENT
Start: 2019-10-16 | End: 2019-10-18 | Stop reason: HOSPADM

## 2019-10-16 RX ORDER — ATORVASTATIN CALCIUM 40 MG/1
40 TABLET, FILM COATED ORAL NIGHTLY
Status: DISCONTINUED | OUTPATIENT
Start: 2019-10-16 | End: 2019-10-18 | Stop reason: HOSPADM

## 2019-10-16 RX ORDER — HYDROCODONE BITARTRATE AND ACETAMINOPHEN 7.5; 325 MG/1; MG/1
1 TABLET ORAL ONCE AS NEEDED
Status: COMPLETED | OUTPATIENT
Start: 2019-10-16 | End: 2019-10-16

## 2019-10-16 RX ORDER — BUPIVACAINE HCL/0.9 % NACL/PF 0.1 %
2 PLASTIC BAG, INJECTION (ML) EPIDURAL ONCE
Status: COMPLETED | OUTPATIENT
Start: 2019-10-16 | End: 2019-10-16

## 2019-10-16 RX ORDER — LIDOCAINE HYDROCHLORIDE 10 MG/ML
INJECTION, SOLUTION INFILTRATION; PERINEURAL AS NEEDED
Status: DISCONTINUED | OUTPATIENT
Start: 2019-10-16 | End: 2019-10-16 | Stop reason: SURG

## 2019-10-16 RX ORDER — BUPIVACAINE HYDROCHLORIDE 5 MG/ML
INJECTION, SOLUTION EPIDURAL; INTRACAUDAL AS NEEDED
Status: DISCONTINUED | OUTPATIENT
Start: 2019-10-16 | End: 2019-10-16 | Stop reason: HOSPADM

## 2019-10-16 RX ORDER — ARIPIPRAZOLE 15 MG/1
15 TABLET ORAL DAILY
Status: DISCONTINUED | OUTPATIENT
Start: 2019-10-17 | End: 2019-10-18 | Stop reason: HOSPADM

## 2019-10-16 RX ORDER — SUCCINYLCHOLINE CHLORIDE 20 MG/ML
INJECTION INTRAMUSCULAR; INTRAVENOUS AS NEEDED
Status: DISCONTINUED | OUTPATIENT
Start: 2019-10-16 | End: 2019-10-16 | Stop reason: SURG

## 2019-10-16 RX ORDER — DEXTROSE MONOHYDRATE 25 G/50ML
25 INJECTION, SOLUTION INTRAVENOUS
Status: DISCONTINUED | OUTPATIENT
Start: 2019-10-16 | End: 2019-10-18 | Stop reason: HOSPADM

## 2019-10-16 RX ORDER — ONDANSETRON 2 MG/ML
4 INJECTION INTRAMUSCULAR; INTRAVENOUS ONCE AS NEEDED
Status: DISCONTINUED | OUTPATIENT
Start: 2019-10-16 | End: 2019-10-16 | Stop reason: HOSPADM

## 2019-10-16 RX ORDER — FENTANYL CITRATE 50 UG/ML
INJECTION, SOLUTION INTRAMUSCULAR; INTRAVENOUS AS NEEDED
Status: DISCONTINUED | OUTPATIENT
Start: 2019-10-16 | End: 2019-10-16 | Stop reason: SURG

## 2019-10-16 RX ORDER — PROPOFOL 10 MG/ML
VIAL (ML) INTRAVENOUS AS NEEDED
Status: DISCONTINUED | OUTPATIENT
Start: 2019-10-16 | End: 2019-10-16 | Stop reason: SURG

## 2019-10-16 RX ORDER — MIDAZOLAM HYDROCHLORIDE 1 MG/ML
INJECTION INTRAMUSCULAR; INTRAVENOUS AS NEEDED
Status: DISCONTINUED | OUTPATIENT
Start: 2019-10-16 | End: 2019-10-16 | Stop reason: SURG

## 2019-10-16 RX ORDER — VENLAFAXINE 100 MG/1
100 TABLET ORAL DAILY
Status: DISCONTINUED | OUTPATIENT
Start: 2019-10-17 | End: 2019-10-17

## 2019-10-16 RX ORDER — HYDROCHLOROTHIAZIDE 12.5 MG/1
12.5 TABLET ORAL DAILY
Status: DISCONTINUED | OUTPATIENT
Start: 2019-10-17 | End: 2019-10-18 | Stop reason: HOSPADM

## 2019-10-16 RX ORDER — SODIUM CHLORIDE 9 MG/ML
1000 INJECTION, SOLUTION INTRAVENOUS CONTINUOUS
Status: DISCONTINUED | OUTPATIENT
Start: 2019-10-16 | End: 2019-10-16

## 2019-10-16 RX ORDER — LABETALOL HYDROCHLORIDE 5 MG/ML
INJECTION, SOLUTION INTRAVENOUS AS NEEDED
Status: DISCONTINUED | OUTPATIENT
Start: 2019-10-16 | End: 2019-10-16 | Stop reason: SURG

## 2019-10-16 RX ORDER — CLOPIDOGREL BISULFATE 75 MG/1
75 TABLET ORAL DAILY
Status: DISCONTINUED | OUTPATIENT
Start: 2019-10-17 | End: 2019-10-18 | Stop reason: HOSPADM

## 2019-10-16 RX ORDER — ONDANSETRON 2 MG/ML
INJECTION INTRAMUSCULAR; INTRAVENOUS AS NEEDED
Status: DISCONTINUED | OUTPATIENT
Start: 2019-10-16 | End: 2019-10-16 | Stop reason: SURG

## 2019-10-16 RX ORDER — METOPROLOL SUCCINATE 25 MG/1
25 TABLET, EXTENDED RELEASE ORAL DAILY
Status: DISCONTINUED | OUTPATIENT
Start: 2019-10-17 | End: 2019-10-18 | Stop reason: HOSPADM

## 2019-10-16 RX ORDER — METOCLOPRAMIDE 10 MG/1
10 TABLET ORAL 4 TIMES DAILY PRN
Status: DISCONTINUED | OUTPATIENT
Start: 2019-10-16 | End: 2019-10-18 | Stop reason: HOSPADM

## 2019-10-16 RX ORDER — NICOTINE POLACRILEX 4 MG
15 LOZENGE BUCCAL
Status: DISCONTINUED | OUTPATIENT
Start: 2019-10-16 | End: 2019-10-18 | Stop reason: HOSPADM

## 2019-10-16 RX ORDER — MIRTAZAPINE 15 MG/1
45 TABLET, FILM COATED ORAL NIGHTLY
Status: DISCONTINUED | OUTPATIENT
Start: 2019-10-16 | End: 2019-10-18 | Stop reason: HOSPADM

## 2019-10-16 RX ORDER — GABAPENTIN 400 MG/1
400 CAPSULE ORAL 3 TIMES DAILY
Status: DISCONTINUED | OUTPATIENT
Start: 2019-10-16 | End: 2019-10-18 | Stop reason: HOSPADM

## 2019-10-16 RX ORDER — DEXAMETHASONE SODIUM PHOSPHATE 4 MG/ML
INJECTION, SOLUTION INTRA-ARTICULAR; INTRALESIONAL; INTRAMUSCULAR; INTRAVENOUS; SOFT TISSUE AS NEEDED
Status: DISCONTINUED | OUTPATIENT
Start: 2019-10-16 | End: 2019-10-16 | Stop reason: SURG

## 2019-10-16 RX ORDER — ASPIRIN 81 MG/1
81 TABLET, CHEWABLE ORAL DAILY
Status: DISCONTINUED | OUTPATIENT
Start: 2019-10-17 | End: 2019-10-18 | Stop reason: HOSPADM

## 2019-10-16 RX ADMIN — ONDANSETRON 4 MG: 2 INJECTION INTRAMUSCULAR; INTRAVENOUS at 13:23

## 2019-10-16 RX ADMIN — LABETALOL HYDROCHLORIDE 5 MG: 5 INJECTION, SOLUTION INTRAVENOUS at 12:00

## 2019-10-16 RX ADMIN — FENTANYL CITRATE 100 MCG: 50 INJECTION, SOLUTION INTRAMUSCULAR; INTRAVENOUS at 10:06

## 2019-10-16 RX ADMIN — DEXAMETHASONE SODIUM PHOSPHATE 4 MG: 4 INJECTION, SOLUTION INTRAMUSCULAR; INTRAVENOUS at 10:53

## 2019-10-16 RX ADMIN — HYDROCODONE BITARTRATE AND ACETAMINOPHEN 1 TABLET: 7.5; 325 TABLET ORAL at 18:42

## 2019-10-16 RX ADMIN — LABETALOL HYDROCHLORIDE 10 MG: 5 INJECTION, SOLUTION INTRAVENOUS at 12:14

## 2019-10-16 RX ADMIN — Medication 2 G: at 10:12

## 2019-10-16 RX ADMIN — FENTANYL CITRATE 50 MCG: 50 INJECTION, SOLUTION INTRAMUSCULAR; INTRAVENOUS at 11:49

## 2019-10-16 RX ADMIN — HYDROMORPHONE HYDROCHLORIDE 1 MG: 1 INJECTION, SOLUTION INTRAMUSCULAR; INTRAVENOUS; SUBCUTANEOUS at 17:33

## 2019-10-16 RX ADMIN — PROPOFOL 150 MG: 10 INJECTION, EMULSION INTRAVENOUS at 10:07

## 2019-10-16 RX ADMIN — HYDROCODONE BITARTRATE AND ACETAMINOPHEN 1 TABLET: 7.5; 325 TABLET ORAL at 15:44

## 2019-10-16 RX ADMIN — GABAPENTIN 400 MG: 400 CAPSULE ORAL at 20:51

## 2019-10-16 RX ADMIN — INSULIN ASPART 4 UNITS: 100 INJECTION, SOLUTION INTRAVENOUS; SUBCUTANEOUS at 18:28

## 2019-10-16 RX ADMIN — MIDAZOLAM HYDROCHLORIDE 2 MG: 2 INJECTION, SOLUTION INTRAMUSCULAR; INTRAVENOUS at 10:03

## 2019-10-16 RX ADMIN — SODIUM CHLORIDE 1000 ML: 900 INJECTION, SOLUTION INTRAVENOUS at 08:32

## 2019-10-16 RX ADMIN — FENTANYL CITRATE 50 MCG: 50 INJECTION, SOLUTION INTRAMUSCULAR; INTRAVENOUS at 12:09

## 2019-10-16 RX ADMIN — LIDOCAINE HYDROCHLORIDE 50 MG: 10 INJECTION, SOLUTION INFILTRATION; PERINEURAL at 10:07

## 2019-10-16 RX ADMIN — SUCCINYLCHOLINE CHLORIDE 160 MG: 20 INJECTION, SOLUTION INTRAMUSCULAR; INTRAVENOUS at 10:07

## 2019-10-16 RX ADMIN — FENTANYL CITRATE 50 MCG: 50 INJECTION, SOLUTION INTRAMUSCULAR; INTRAVENOUS at 12:03

## 2019-10-16 RX ADMIN — HYDROMORPHONE HYDROCHLORIDE 0.5 MG: 1 INJECTION, SOLUTION INTRAMUSCULAR; INTRAVENOUS; SUBCUTANEOUS at 14:27

## 2019-10-16 RX ADMIN — ATORVASTATIN CALCIUM 40 MG: 40 TABLET, FILM COATED ORAL at 20:50

## 2019-10-16 RX ADMIN — SODIUM CHLORIDE: 900 INJECTION, SOLUTION INTRAVENOUS at 12:36

## 2019-10-16 RX ADMIN — HYDROMORPHONE HYDROCHLORIDE 1 MG: 1 INJECTION, SOLUTION INTRAMUSCULAR; INTRAVENOUS; SUBCUTANEOUS at 23:01

## 2019-10-16 NOTE — ANESTHESIA PROCEDURE NOTES
Airway  Urgency: elective    Date/Time: 10/16/2019 10:09 AM  Airway not difficult    General Information and Staff    Patient location during procedure: OR  CRNA: Larry Morales CRNA    Indications and Patient Condition  Indications for airway management: airway protection    Preoxygenated: yes  Mask difficulty assessment: 0 - not attempted    Final Airway Details  Final airway type: endotracheal airway      Successful airway: ETT  Cuffed: yes   Successful intubation technique: direct laryngoscopy and RSI  Facilitating devices/methods: intubating stylet  Blade: [place]  Blade size: 2  ETT size (mm): 7.5  Cormack-Lehane Classification: grade I - full view of glottis  Placement verified by: chest auscultation and capnometry   Measured from: lips  ETT/EBT  to lips (cm): 21  Number of attempts at approach: 1  Assessment: lips, teeth, and gum same as pre-op and atraumatic intubation

## 2019-10-16 NOTE — ANESTHESIA POSTPROCEDURE EVALUATION
Patient: Ariana Martinez    Procedure Summary     Date:  10/16/19 Room / Location:  [facility] OR [address] [place] [address] OR    Anesthesia Start:  1005 Anesthesia Stop:  1415    Procedure:  foot hardware removal, subtalar joint fusion  possible de/reattachment of achilles tendon        (c-arm) (Left Foot) Diagnosis:       Pes cavus      Pain from implanted hardware, initial encounter      (Pes cavus [Q66.7])      (Pain from implanted hardware, initial encounter [T85.848A])    Surgeon:  Ignacio Lord DPM Provider:  Ignacio Redmond MD    Anesthesia Type:  general ASA Status:  3          Anesthesia Type: general  Last vitals  BP   105/63 (10/16/19 0825)   Temp   97.5 °F (36.4 °C) (10/16/19 0825)   Pulse   84 (10/16/19 0825)   Resp   12 (10/16/19 0825)     SpO2   96 % (10/16/19 0825)     Post Anesthesia Care and Evaluation    Patient location during evaluation: PACU  Patient participation: complete - patient participated  Level of consciousness: awake  Pain score: 0  Pain management: adequate  Airway patency: patent  Anesthetic complications: No anesthetic complications  PONV Status: none  Cardiovascular status: acceptable  Respiratory status: acceptable  Hydration status: acceptable

## 2019-10-16 NOTE — ANESTHESIA PREPROCEDURE EVALUATION
Anesthesia Evaluation     no history of anesthetic complications:  NPO Solid Status: > 8 hours  NPO Liquid Status: > 2 hours           Airway   Mallampati: II  TM distance: >3 FB  Neck ROM: full  No difficulty expected  Dental    (+) upper dentures and lower dentures    Pulmonary     breath sounds clear to auscultation  (+) decreased breath sounds,   (-) COPD, asthma, sleep apnea, not a smoker    ROS comment: snores  Cardiovascular - normal exam  Exercise tolerance: good (4-7 METS)    ECG reviewed  PT is on anticoagulation therapy  Patient on routine beta blocker and Beta blocker given within 24 hours of surgery  Rhythm: regular  Rate: normal    (+) hypertension poorly controlled 2 medications or greater, CAD, CABG >6 Months, cardiac stents more than 12 months ago hyperlipidemia,   (-) valvular problems/murmurs, dysrhythmias, angina, CHF, PVD, DVT    ROS comment: Rhythm: sinus rhythm  Rate: normal  BPM: 72  ST Segments: ST segments normal  Clinical impression: normal ECG  Interpreted by German      · Left Ventricle: Estimated EF appears to be in the range of 51 - 55%. Normal left ventricular cavity size noted  · There is moderate eccentric hypertrophy of the left ventricular cavity. Left ventricular diastolic dysfunction is noted (grade II w/high LAP) consistent with pseudonormalization.  · Right Ventricle: Normal right ventricular wall thickness, systolic function and septal motion noted. Right ventricular cavity is borderline dilated  · No evidence of a patent foramen ovale. No evidence of an atrial septal defect present. Saline test results are negative.  · The aortic valve is abnormal with mild calcification of the right coronary cusp.  · Mitral Valve: The mitral valve is abnormal in structure. There is anterior leaflet thickening present. Mild mitral valve regurgitation is present.  · Trace to mild tricuspid valve regurgitation is present. Estimated right ventricular systolic pressure from tricuspid  regurgitation is moderately elevated (45-55 mmHg)  · Mild pulmonary hypertension is present.  · There is no evidence of pericardial effusion.       Neuro/Psych  (+) numbness (both feet), psychiatric history Anxiety and Depression,     (-) seizures, TIA, CVA, headaches  GI/Hepatic/Renal/Endo    (+) morbid obesity, GERD well controlled,  diabetes mellitus (glu 208) type 2 poorly controlled using insulin,   (-) hepatitis, liver disease, no renal disease    Musculoskeletal         ROS comment: Left foot  Abdominal   (+) obese,    Substance History - negative use     OB/GYN          Other   (+) arthritis     (-) history of cancer                    Anesthesia Plan    ASA 3     general   (Bladder stimulator and was turned off last time)  intravenous induction   Anesthetic plan, all risks, benefits, and alternatives have been provided, discussed and informed consent has been obtained with: patient and spouse/significant other.

## 2019-10-17 LAB
GLUCOSE BLDC GLUCOMTR-MCNC: 246 MG/DL (ref 70–130)
GLUCOSE BLDC GLUCOMTR-MCNC: 248 MG/DL (ref 70–130)
GLUCOSE BLDC GLUCOMTR-MCNC: 269 MG/DL (ref 70–130)
GLUCOSE BLDC GLUCOMTR-MCNC: 289 MG/DL (ref 70–130)

## 2019-10-17 PROCEDURE — 25010000002 HYDROMORPHONE 1 MG/ML SOLUTION: Performed by: PODIATRIST

## 2019-10-17 PROCEDURE — 97162 PT EVAL MOD COMPLEX 30 MIN: CPT

## 2019-10-17 PROCEDURE — 63710000001 INSULIN ASPART PER 5 UNITS: Performed by: PODIATRIST

## 2019-10-17 PROCEDURE — 82962 GLUCOSE BLOOD TEST: CPT

## 2019-10-17 PROCEDURE — 99024 POSTOP FOLLOW-UP VISIT: CPT | Performed by: PODIATRIST

## 2019-10-17 PROCEDURE — G0378 HOSPITAL OBSERVATION PER HR: HCPCS

## 2019-10-17 RX ORDER — VENLAFAXINE 25 MG/1
100 TABLET ORAL DAILY
Status: DISCONTINUED | OUTPATIENT
Start: 2019-10-18 | End: 2019-10-18 | Stop reason: HOSPADM

## 2019-10-17 RX ADMIN — ASPIRIN 81 MG 81 MG: 81 TABLET ORAL at 08:21

## 2019-10-17 RX ADMIN — INSULIN ASPART 6 UNITS: 100 INJECTION, SOLUTION INTRAVENOUS; SUBCUTANEOUS at 17:44

## 2019-10-17 RX ADMIN — VENLAFAXINE 100 MG: 25 TABLET ORAL at 08:20

## 2019-10-17 RX ADMIN — INSULIN ASPART 4 UNITS: 100 INJECTION, SOLUTION INTRAVENOUS; SUBCUTANEOUS at 11:35

## 2019-10-17 RX ADMIN — HYDROMORPHONE HYDROCHLORIDE 1 MG: 1 INJECTION, SOLUTION INTRAMUSCULAR; INTRAVENOUS; SUBCUTANEOUS at 08:34

## 2019-10-17 RX ADMIN — GABAPENTIN 400 MG: 400 CAPSULE ORAL at 08:20

## 2019-10-17 RX ADMIN — HYDROCHLOROTHIAZIDE 12.5 MG: 12.5 CAPSULE ORAL at 08:21

## 2019-10-17 RX ADMIN — HYDROCODONE BITARTRATE AND ACETAMINOPHEN 1 TABLET: 7.5; 325 TABLET ORAL at 04:27

## 2019-10-17 RX ADMIN — INSULIN ASPART 4 UNITS: 100 INJECTION, SOLUTION INTRAVENOUS; SUBCUTANEOUS at 21:11

## 2019-10-17 RX ADMIN — ATORVASTATIN CALCIUM 40 MG: 40 TABLET, FILM COATED ORAL at 21:10

## 2019-10-17 RX ADMIN — CLOPIDOGREL BISULFATE 75 MG: 75 TABLET ORAL at 08:20

## 2019-10-17 RX ADMIN — GABAPENTIN 400 MG: 400 CAPSULE ORAL at 21:10

## 2019-10-17 RX ADMIN — HYDROCODONE BITARTRATE AND ACETAMINOPHEN 1 TABLET: 7.5; 325 TABLET ORAL at 18:17

## 2019-10-17 RX ADMIN — INSULIN ASPART 6 UNITS: 100 INJECTION, SOLUTION INTRAVENOUS; SUBCUTANEOUS at 08:23

## 2019-10-17 RX ADMIN — INSULIN ASPART 60 UNITS: 100 INJECTION, SOLUTION INTRAVENOUS; SUBCUTANEOUS at 17:44

## 2019-10-17 RX ADMIN — ARIPIPRAZOLE 15 MG: 15 TABLET ORAL at 08:20

## 2019-10-18 VITALS
HEART RATE: 90 BPM | HEIGHT: 68 IN | WEIGHT: 259.04 LBS | TEMPERATURE: 96.6 F | RESPIRATION RATE: 18 BRPM | DIASTOLIC BLOOD PRESSURE: 68 MMHG | OXYGEN SATURATION: 99 % | SYSTOLIC BLOOD PRESSURE: 108 MMHG | BODY MASS INDEX: 39.26 KG/M2

## 2019-10-18 LAB
GLUCOSE BLDC GLUCOMTR-MCNC: 241 MG/DL (ref 70–130)
GLUCOSE BLDC GLUCOMTR-MCNC: 254 MG/DL (ref 70–130)

## 2019-10-18 PROCEDURE — 97116 GAIT TRAINING THERAPY: CPT

## 2019-10-18 PROCEDURE — G0378 HOSPITAL OBSERVATION PER HR: HCPCS

## 2019-10-18 PROCEDURE — 97530 THERAPEUTIC ACTIVITIES: CPT

## 2019-10-18 PROCEDURE — 82962 GLUCOSE BLOOD TEST: CPT

## 2019-10-18 PROCEDURE — 99024 POSTOP FOLLOW-UP VISIT: CPT | Performed by: PODIATRIST

## 2019-10-18 PROCEDURE — 63710000001 INSULIN ASPART PER 5 UNITS: Performed by: PODIATRIST

## 2019-10-18 RX ADMIN — HYDROCODONE BITARTRATE AND ACETAMINOPHEN 1 TABLET: 7.5; 325 TABLET ORAL at 08:52

## 2019-10-18 RX ADMIN — INSULIN ASPART 4 UNITS: 100 INJECTION, SOLUTION INTRAVENOUS; SUBCUTANEOUS at 08:54

## 2019-10-18 RX ADMIN — ARIPIPRAZOLE 15 MG: 15 TABLET ORAL at 08:53

## 2019-10-18 RX ADMIN — INSULIN ASPART 60 UNITS: 100 INJECTION, SOLUTION INTRAVENOUS; SUBCUTANEOUS at 08:53

## 2019-10-18 RX ADMIN — GABAPENTIN 400 MG: 400 CAPSULE ORAL at 08:53

## 2019-10-18 RX ADMIN — HYDROCHLOROTHIAZIDE 12.5 MG: 12.5 CAPSULE ORAL at 08:53

## 2019-10-18 RX ADMIN — CLOPIDOGREL BISULFATE 75 MG: 75 TABLET ORAL at 08:53

## 2019-10-18 RX ADMIN — METOPROLOL SUCCINATE 25 MG: 25 TABLET, EXTENDED RELEASE ORAL at 08:53

## 2019-10-18 RX ADMIN — ASPIRIN 81 MG 81 MG: 81 TABLET ORAL at 08:53

## 2019-10-18 RX ADMIN — HYDROCODONE BITARTRATE AND ACETAMINOPHEN 1 TABLET: 7.5; 325 TABLET ORAL at 00:49

## 2019-10-18 RX ADMIN — VENLAFAXINE 100 MG: 25 TABLET ORAL at 08:53

## 2019-10-19 ENCOUNTER — READMISSION MANAGEMENT (OUTPATIENT)
Dept: CALL CENTER | Facility: HOSPITAL | Age: 59
End: 2019-10-19

## 2019-10-19 NOTE — OUTREACH NOTE
Prep Survey      Responses   Facility patient discharged from?  [place] [facility]   Is patient eligible?  Yes   Discharge diagnosis  s/p foot hardware removal, subtalar joint fusion, ankle subtalar arthrodesis, Pes cavus, hindfoot varus, acquired, left, pain from implanted hardware   Does the patient have one of the following disease processes/diagnoses(primary or secondary)?  General Surgery   Does the patient have Home health ordered?  No   Is there a DME ordered?  Yes   What DME was ordered?  [facility]   Comments regarding appointments  See AVS   Prep survey completed?  Yes          Елена Sow RN

## 2019-10-21 ENCOUNTER — OFFICE VISIT (OUTPATIENT)
Dept: PODIATRY | Facility: CLINIC | Age: 59
End: 2019-10-21

## 2019-10-21 VITALS — BODY MASS INDEX: 39.25 KG/M2 | HEIGHT: 68 IN | WEIGHT: 259 LBS | OXYGEN SATURATION: 98 % | HEART RATE: 81 BPM

## 2019-10-21 DIAGNOSIS — Z98.890 S/P HARDWARE REMOVAL: ICD-10-CM

## 2019-10-21 DIAGNOSIS — M21.172 SUBTALAR VARUS, ACQUIRED, LEFT: ICD-10-CM

## 2019-10-21 DIAGNOSIS — Q66.70 PES CAVUS: Primary | ICD-10-CM

## 2019-10-21 LAB
LAB AP CASE REPORT: NORMAL
PATH REPORT.FINAL DX SPEC: NORMAL
PATH REPORT.GROSS SPEC: NORMAL

## 2019-10-21 PROCEDURE — 29405 APPL SHORT LEG CAST: CPT | Performed by: PODIATRIST

## 2019-10-21 PROCEDURE — 99024 POSTOP FOLLOW-UP VISIT: CPT | Performed by: PODIATRIST

## 2019-10-21 NOTE — PROGRESS NOTES
Arianatorsten Martinez  1960  59 y.o. female   WILMAR Fong - 06/07/19  BS - 88 this morning per patient     Patient presents today for a post op follow up on the left foot.     10/21/2019      Chief Complaint   Patient presents with   • Left Foot - Post-op Follow-up       History of Present Illness    Ms Martinez is a 60 y/o diabetic female who presents for follow-up of left subtalar joint arthrodesis, hardware removal and gastrocnemius recession.  She is doing well overall with expected postoperative pain.  She is nonweightbearing with the assistance of a wheelchair.    Past Medical History:   Diagnosis Date   • Angina, class IV (CMS/[facility])    • Anxiety    • Anxiety and depression    • Benign paroxysmal positional vertigo    • Bladder disorder     has bladder stimulator   • Carpal tunnel syndrome    • Chronic pain    • Coronary atherosclerosis     hx CABG 2005.  is followed by Dr Kwon   • Depression    • Diabetes mellitus (CMS/HCC)     Type 2, controlled   • Diabetic polyneuropathy (CMS/[facility])    • Elevated cholesterol    • Female stress incontinence    • Foot pain, left    • Full dentures    • Gastroparesis    • GERD (gastroesophageal reflux disease)    • Hyperlipidemia    • Hypertension    • Low back pain    • Malaise and fatigue    • Multiple joint pain    • Obesity     Refuses to be weighed   • Otalgia     Both   • Perforation of tympanic membrane     Left   • Postoperative wound infection    • Vitamin D deficiency    • Wears glasses     reading         Past Surgical History:   Procedure Laterality Date   • ABDOMINAL SURGERY     • ANGIOPLASTY      coronary   • BREAST BIOPSY Right    • CARDIAC CATHETERIZATION     • CARDIAC CATHETERIZATION N/A 6/20/2017    Procedure: Right Heart Cath;  Surgeon: Can Kwon MD PhD;  Location: [facility] INVASIVE LOCATION;  Service:    • CARPAL TUNNEL RELEASE     • CHOLECYSTECTOMY     • CORONARY ARTERY BYPASS GRAFT  2005   • ENDOSCOPY N/A 10/19/2018    Procedure:  ESOPHAGOGASTRODUODENOSCOPY possible dilation;  Surgeon: Julián Maldonado MD;  Location: [facility] [place] ENDOSCOPY;  Service: Gastroenterology   • ENDOSCOPY AND COLONOSCOPY     • FOOT SURGERY      Toes   • GASTRIC BANDING      Revision, laparoscopic   • HYSTERECTOMY     • MOUTH SURGERY     • SALPINGO OOPHORECTOMY     • SHOULDER SURGERY     • SUBTALAR ARTHRODESIS Left 1/16/2019    Procedure: LEFT FOOT HARDWARE REMOVAL, FIFTH METATARSAL , OPEN REDUCTION INTERNAL FIXATION, CALCANEAL OSTEOTOMY;  Surgeon: Ignacio Lord DPM;  Location: [facility] [place] OR;  Service: Podiatry   • TRANSESOPHAGEAL ECHOCARDIOGRAM (LAMONTE)      With color flow         Family History   Problem Relation Age of Onset   • Diabetes Other    • Heart disease Other    • Hypertension Other    • Heart disease Mother    • Stroke Mother    • Hypertension Mother    • Diabetes Sister    • Heart disease Sister    • Hypertension Sister    • Heart disease Sister    • Diabetes Sister    • Hypertension Sister    • Diabetes Sister    • Diabetes Sister    • Diabetes Sister    • Diabetes Sister          Social History     Socioeconomic History   • Marital status:      Spouse name: Not on file   • Number of children: Not on file   • Years of education: Not on file   • Highest education level: Not on file   Tobacco Use   • Smoking status: Never Smoker   • Smokeless tobacco: Never Used   Substance and Sexual Activity   • Alcohol use: No   • Drug use: No   • Sexual activity: Defer         Current Outpatient Medications   Medication Sig Dispense Refill   • ARIPiprazole (ABILIFY) 15 MG tablet Take 1 tablet by mouth Daily. 90 tablet 1   • aspirin 81 MG chewable tablet Chew 81 mg daily.     • atorvastatin (LIPITOR) 40 MG tablet Take 40 mg by mouth Every Night.     • BD SHARPS CONTAINER HOME misc 1 each Take As Directed. 1 each 0   • Blood Glucose Monitoring Suppl (ONE TOUCH ULTRA MINI) w/Device kit USE AS DIRECTED TO CHECK BLOOD SUGAR 1 each 0   • Calcium Citrate-Vitamin D  (CITRACAL/VITAMIN D) 250-200 MG-UNIT tablet Take 2 tablets by mouth 2 (two) times a day.     • clopidogrel (PLAVIX) 75 MG tablet Take 75 mg by mouth Daily.     • cyanocobalamin 1000 MCG/ML injection Inject 1 mL into the appropriate muscle as directed by prescriber Every 28 (Twenty-Eight) Days. 1 mL 0   • gabapentin (NEURONTIN) 400 MG capsule Take 400 mg by mouth 3 (Three) Times a Day.     • GLUCAGON EMERGENCY 1 MG injection USE AS DIRECTED AS NEEDED 1 kit 0   • glucose blood (ONE TOUCH ULTRA TEST) test strip 1 each by Other route 4 (Four) Times a Day. Use as instructed 400 each 1   • hydroCHLOROthiazide (HYDRODIURIL) 12.5 MG tablet Take 12.5 mg by mouth Daily.     • HYDROcodone-acetaminophen (NORCO) 7.5-325 MG per tablet Take 1 tablet by mouth Every 4 (Four) Hours As Needed for Moderate Pain  or Severe Pain . 180 tablet 0   • insulin aspart (novoLOG FLEXPEN) 100 UNIT/ML solution pen-injector sc pen Inject 60 Units under the skin into the appropriate area as directed 3 (Three) Times a Day With Meals.     • Insulin Glargine (BASAGLAR KWIKPEN) 100 UNIT/ML injection pen Inject 100 Units under the skin into the appropriate area as directed Every Night. 5 pen 5   • Insulin Pen Needle (B-D ULTRAFINE III SHORT PEN) 31G X 8 MM misc USE TO INJECT 4 TIMES A  each 11   • LORazepam (ATIVAN) 0.5 MG tablet Take 1 tablet by mouth Daily As Needed for Anxiety. 30 tablet 0   • meclizine (ANTIVERT) 25 MG tablet Take 1 tablet by mouth 3 (Three) Times a Day As Needed for dizziness. 90 tablet 6   • metoclopramide (REGLAN) 10 MG tablet Take 10 mg by mouth 4 (Four) Times a Day As Needed.     • metoprolol succinate XL (TOPROL-XL) 25 MG 24 hr tablet TAKE 1 TABLET BY MOUTH DAILY. 31 tablet 6   • mirtazapine (REMERON) 45 MG tablet TAKE 1 TABLET BY MOUTH EVERY NIGHT. 90 tablet 0   • omeprazole (PRILOSEC) 40 MG capsule Take 1 capsule by mouth Daily. 90 capsule 1   • ONE TOUCH ULTRA TEST test strip USE TO TEST SUGAR 4 TIMES A  each 3  "  • Syringe/Needle, Disp, (LUER LOCK SAFETY SYRINGES) 25G X 5/8\" 3 ML misc 1 each Daily. 100 each 0   • venlafaxine (EFFEXOR) 100 MG tablet Take 100 mg by mouth 2 (Two) Times a Day.     • vitamin D (ERGOCALCIFEROL) 78333 units capsule capsule Take 50,000 Units by mouth 1 (One) Time Per Week. sunday       No current facility-administered medications for this visit.      Review of Systems   Constitutional: Negative.    HENT: Negative.    Eyes: Negative.    Respiratory: Negative.    Cardiovascular: Negative.    Gastrointestinal: Negative.    Endocrine: Negative.    Genitourinary: Negative.    Musculoskeletal: Negative.         Left foot pain    Skin: Negative.    Allergic/Immunologic: Negative.    Neurological: Positive for numbness.   Hematological: Negative.    Psychiatric/Behavioral: Negative.          OBJECTIVE    Pulse 81   Ht 172.7 cm (68\")   Wt 117 kg (259 lb)   SpO2 98%   BMI 39.38 kg/m²         Physical Exam   Constitutional: she appears well-developed and well-nourished.   Psychiatric: she has a normal mood and affect. her   behavior is normal.      Lower Extremity Exam:  Neurovascular status intact  Cap Refill time brisk   Negative Conrad  Left foot and ankle incisions coapted with sutures in place.  No sign of infection.  Subtalar joint rectus, rigid    Procedures        ASSESSMENT AND PLAN    Ariana was seen today for post-op follow-up.    Diagnoses and all orders for this visit:    Pes cavus    S/P hardware removal    Subtalar varus, acquired, left        -Doing well postoperatively with expected postoperative swelling and pain.  -Placed in a well-padded below-knee cast today.  -Continue strict nonweightbearing with assistance of wheelchair  -Recheck 1 week, serial radiographs and suture removal.          This document has been electronically signed by Ignacio Lord DPM on October 22, 2019 1:21 PM       "

## 2019-10-22 ENCOUNTER — READMISSION MANAGEMENT (OUTPATIENT)
Dept: CALL CENTER | Facility: HOSPITAL | Age: 59
End: 2019-10-22

## 2019-10-22 NOTE — OUTREACH NOTE
General Surgery Week 1 Survey      Responses   Facility patient discharged from?  [facility]   Does the patient have one of the following disease processes/diagnoses(primary or secondary)?  General Surgery   Is there a successful TCM telephone encounter documented?  No   Week 1 attempt successful?  Yes   Call start time  1413   Call end time  1415   Discharge diagnosis  s/p foot hardware removal, subtalar joint fusion, ankle subtalar arthrodesis, Pes cavus, hindfoot varus, acquired, left, pain from implanted hardware   Does the patient have a follow up appointment scheduled with their surgeon?  Yes   Has the patient kept scheduled appointments due by today?  Yes   Comments  had post op follow up yesterday   Has home health visited the patient within 72 hours of discharge?  N/A   What DME was ordered?  [facility] from [facility]   Has all DME been delivered?  Yes   Psychosocial issues?  No   Did the patient receive a copy of their discharge instructions?  Yes   What is the patient's perception of their health status since discharge?  Improving   Nursing interventions  Nurse provided patient education   Is the patient/caregiver able to teach back the hierarchy of who to call/visit for symptoms/problems? PCP, Specialist, Home health nurse, Urgent Care, ED, 911  Yes   Additional teach back comments  Pt says that she is doing good and she is seeing her surgeon for dressing changes, no questions or concerns at this time.   Week 1 call completed?  Yes          Yolanda Thomas RN

## 2019-10-23 ENCOUNTER — INFUSION (OUTPATIENT)
Dept: ONCOLOGY | Facility: HOSPITAL | Age: 59
End: 2019-10-23

## 2019-10-23 VITALS
DIASTOLIC BLOOD PRESSURE: 55 MMHG | RESPIRATION RATE: 18 BRPM | TEMPERATURE: 97.8 F | HEART RATE: 100 BPM | SYSTOLIC BLOOD PRESSURE: 121 MMHG

## 2019-10-23 DIAGNOSIS — T45.4X5A ADVERSE EFFECT OF IRON, INITIAL ENCOUNTER: ICD-10-CM

## 2019-10-23 DIAGNOSIS — E61.1 IRON DEFICIENCY: Primary | ICD-10-CM

## 2019-10-23 PROCEDURE — 96374 THER/PROPH/DIAG INJ IV PUSH: CPT | Performed by: NURSE PRACTITIONER

## 2019-10-23 PROCEDURE — 25010000002 FERUMOXYTOL 510 MG/17ML SOLUTION 510 MG VIAL: Performed by: INTERNAL MEDICINE

## 2019-10-23 PROCEDURE — 96375 TX/PRO/DX INJ NEW DRUG ADDON: CPT | Performed by: NURSE PRACTITIONER

## 2019-10-23 PROCEDURE — 63710000001 DIPHENHYDRAMINE PER 50 MG: Performed by: INTERNAL MEDICINE

## 2019-10-23 RX ORDER — FAMOTIDINE 10 MG/ML
20 INJECTION, SOLUTION INTRAVENOUS ONCE
Status: COMPLETED | OUTPATIENT
Start: 2019-10-23 | End: 2019-10-23

## 2019-10-23 RX ORDER — SODIUM CHLORIDE 9 MG/ML
250 INJECTION, SOLUTION INTRAVENOUS ONCE
Status: COMPLETED | OUTPATIENT
Start: 2019-10-23 | End: 2019-10-23

## 2019-10-23 RX ORDER — DIPHENHYDRAMINE HCL 25 MG
25 CAPSULE ORAL ONCE
Status: CANCELLED | OUTPATIENT
Start: 2019-10-23

## 2019-10-23 RX ORDER — SODIUM CHLORIDE 9 MG/ML
250 INJECTION, SOLUTION INTRAVENOUS ONCE
Status: CANCELLED | OUTPATIENT
Start: 2019-10-23

## 2019-10-23 RX ORDER — FAMOTIDINE 10 MG/ML
20 INJECTION, SOLUTION INTRAVENOUS ONCE
Status: CANCELLED | OUTPATIENT
Start: 2019-10-23

## 2019-10-23 RX ORDER — DIPHENHYDRAMINE HCL 25 MG
25 CAPSULE ORAL ONCE
Status: COMPLETED | OUTPATIENT
Start: 2019-10-23 | End: 2019-10-23

## 2019-10-23 RX ORDER — ACETAMINOPHEN 325 MG/1
650 TABLET ORAL ONCE
Status: CANCELLED | OUTPATIENT
Start: 2019-10-23

## 2019-10-23 RX ADMIN — FERUMOXYTOL 510 MG: 510 INJECTION INTRAVENOUS at 14:02

## 2019-10-23 RX ADMIN — FAMOTIDINE 20 MG: 10 INJECTION, SOLUTION INTRAVENOUS at 13:37

## 2019-10-23 RX ADMIN — DIPHENHYDRAMINE HYDROCHLORIDE 25 MG: 25 CAPSULE ORAL at 13:37

## 2019-10-23 RX ADMIN — SODIUM CHLORIDE 250 ML: 9 INJECTION, SOLUTION INTRAVENOUS at 13:35

## 2019-10-28 RX ORDER — VENLAFAXINE 100 MG/1
100 TABLET ORAL 2 TIMES DAILY
Qty: 60 TABLET | Refills: 3 | Status: SHIPPED | OUTPATIENT
Start: 2019-10-28 | End: 2019-12-19

## 2019-10-28 RX ORDER — MELOXICAM 15 MG/1
TABLET ORAL
Qty: 30 TABLET | Refills: 1 | OUTPATIENT
Start: 2019-10-28

## 2019-10-29 ENCOUNTER — OFFICE VISIT (OUTPATIENT)
Dept: PODIATRY | Facility: CLINIC | Age: 59
End: 2019-10-29

## 2019-10-29 VITALS — BODY MASS INDEX: 39.25 KG/M2 | WEIGHT: 259 LBS | HEART RATE: 83 BPM | HEIGHT: 68 IN | OXYGEN SATURATION: 99 %

## 2019-10-29 DIAGNOSIS — Z98.890 S/P HARDWARE REMOVAL: ICD-10-CM

## 2019-10-29 DIAGNOSIS — Q66.70 PES CAVUS: ICD-10-CM

## 2019-10-29 DIAGNOSIS — M21.172 SUBTALAR VARUS, ACQUIRED, LEFT: Primary | ICD-10-CM

## 2019-10-29 PROCEDURE — 99024 POSTOP FOLLOW-UP VISIT: CPT | Performed by: PODIATRIST

## 2019-10-29 PROCEDURE — 29405 APPL SHORT LEG CAST: CPT | Performed by: PODIATRIST

## 2019-10-29 RX ORDER — CLINDAMYCIN HYDROCHLORIDE 300 MG/1
300 CAPSULE ORAL 3 TIMES DAILY
Qty: 30 CAPSULE | Refills: 0 | Status: SHIPPED | OUTPATIENT
Start: 2019-10-29 | End: 2019-12-03

## 2019-10-30 ENCOUNTER — DOCUMENTATION (OUTPATIENT)
Dept: PHYSICAL THERAPY | Facility: HOSPITAL | Age: 59
End: 2019-10-30

## 2019-10-30 ENCOUNTER — INFUSION (OUTPATIENT)
Dept: ONCOLOGY | Facility: HOSPITAL | Age: 59
End: 2019-10-30

## 2019-10-30 ENCOUNTER — READMISSION MANAGEMENT (OUTPATIENT)
Dept: CALL CENTER | Facility: HOSPITAL | Age: 59
End: 2019-10-30

## 2019-10-30 VITALS
SYSTOLIC BLOOD PRESSURE: 129 MMHG | TEMPERATURE: 97.4 F | DIASTOLIC BLOOD PRESSURE: 57 MMHG | RESPIRATION RATE: 18 BRPM | HEART RATE: 88 BPM

## 2019-10-30 DIAGNOSIS — Q66.70 PES CAVUS: ICD-10-CM

## 2019-10-30 DIAGNOSIS — T45.4X5A ADVERSE EFFECT OF IRON, INITIAL ENCOUNTER: ICD-10-CM

## 2019-10-30 DIAGNOSIS — E61.1 IRON DEFICIENCY: Primary | ICD-10-CM

## 2019-10-30 DIAGNOSIS — S92.355S CLOSED NONDISPLACED FRACTURE OF FIFTH METATARSAL BONE OF LEFT FOOT, SEQUELA: ICD-10-CM

## 2019-10-30 DIAGNOSIS — R26.81 UNSTEADY GAIT: Primary | ICD-10-CM

## 2019-10-30 PROCEDURE — 63710000001 DIPHENHYDRAMINE PER 50 MG: Performed by: INTERNAL MEDICINE

## 2019-10-30 PROCEDURE — 96374 THER/PROPH/DIAG INJ IV PUSH: CPT | Performed by: INTERNAL MEDICINE

## 2019-10-30 PROCEDURE — 25010000002 FERUMOXYTOL 510 MG/17ML SOLUTION 510 MG VIAL: Performed by: INTERNAL MEDICINE

## 2019-10-30 PROCEDURE — 96375 TX/PRO/DX INJ NEW DRUG ADDON: CPT | Performed by: INTERNAL MEDICINE

## 2019-10-30 RX ORDER — DIPHENHYDRAMINE HCL 25 MG
25 CAPSULE ORAL ONCE
Status: CANCELLED | OUTPATIENT
Start: 2019-10-30

## 2019-10-30 RX ORDER — ACETAMINOPHEN 325 MG/1
650 TABLET ORAL ONCE
Status: CANCELLED | OUTPATIENT
Start: 2020-01-10

## 2019-10-30 RX ORDER — DIPHENHYDRAMINE HCL 25 MG
25 CAPSULE ORAL ONCE
Status: COMPLETED | OUTPATIENT
Start: 2019-10-30 | End: 2019-10-30

## 2019-10-30 RX ORDER — FAMOTIDINE 10 MG/ML
20 INJECTION, SOLUTION INTRAVENOUS ONCE
Status: COMPLETED | OUTPATIENT
Start: 2019-10-30 | End: 2019-10-30

## 2019-10-30 RX ORDER — FAMOTIDINE 10 MG/ML
20 INJECTION, SOLUTION INTRAVENOUS ONCE
Status: CANCELLED | OUTPATIENT
Start: 2019-10-30

## 2019-10-30 RX ORDER — SODIUM CHLORIDE 9 MG/ML
250 INJECTION, SOLUTION INTRAVENOUS ONCE
Status: COMPLETED | OUTPATIENT
Start: 2019-10-30 | End: 2019-10-30

## 2019-10-30 RX ORDER — SODIUM CHLORIDE 9 MG/ML
250 INJECTION, SOLUTION INTRAVENOUS ONCE
Status: CANCELLED | OUTPATIENT
Start: 2019-10-30

## 2019-10-30 RX ADMIN — SODIUM CHLORIDE 250 ML: 9 INJECTION, SOLUTION INTRAVENOUS at 13:28

## 2019-10-30 RX ADMIN — FERUMOXYTOL 510 MG: 510 INJECTION INTRAVENOUS at 13:37

## 2019-10-30 RX ADMIN — DIPHENHYDRAMINE HYDROCHLORIDE 25 MG: 25 CAPSULE ORAL at 13:21

## 2019-10-30 RX ADMIN — FAMOTIDINE 20 MG: 10 INJECTION INTRAVENOUS at 13:21

## 2019-10-30 NOTE — OUTREACH NOTE
General Surgery Week 2 Survey      Responses   Facility patient discharged from?  [facility]   Does the patient have one of the following disease processes/diagnoses(primary or secondary)?  General Surgery   Week 2 attempt successful?  Yes   Call start time  1045   Call end time  1049   Discharge diagnosis  s/p foot hardware removal, subtalar joint fusion, ankle subtalar arthrodesis, Pes cavus, hindfoot varus, acquired, left, pain from implanted hardware   Is patient permission given to speak with other caregiver?  Yes   List who call center can speak with  , Nadeem Hicks reviewed with patient/caregiver?  Yes   Is the patient taking all medications as directed (includes completed medication regime)?  Yes   Does the patient have a follow up appointment scheduled with their surgeon?  Yes   Has the patient kept scheduled appointments due by today?  Yes   Comments  Patient states that she sees Dr for wound care weekly.    Has home health visited the patient within 72 hours of discharge?  N/A   Psychosocial issues?  No   Did the patient receive a copy of their discharge instructions?  Yes   Nursing interventions  Reviewed instructions with patient   What is the patient's perception of their health status since discharge?  Improving   Nursing interventions  Nurse provided patient education   Is the patient/caregiver able to teach back signs and symptoms of incisional infection?  Increased redness, swelling or pain at the incisonal site, Increased drainage or bleeding, Fever   Is the patient/caregiver able to teach back the hierarchy of who to call/visit for symptoms/problems? PCP, Specialist, Home health nurse, Urgent Care, ED, 911  Yes   Week 2 call completed?  Yes          Елена Lagunas RN

## 2019-11-04 DIAGNOSIS — E53.8 CYANOCOBALAMIN DEFICIENCY: ICD-10-CM

## 2019-11-04 RX ORDER — CYANOCOBALAMIN 1000 UG/ML
1000 INJECTION, SOLUTION INTRAMUSCULAR; SUBCUTANEOUS
Qty: 1 ML | Refills: 0 | Status: SHIPPED | OUTPATIENT
Start: 2019-11-04 | End: 2019-11-26 | Stop reason: SDUPTHER

## 2019-11-08 ENCOUNTER — READMISSION MANAGEMENT (OUTPATIENT)
Dept: CALL CENTER | Facility: HOSPITAL | Age: 59
End: 2019-11-08

## 2019-11-08 NOTE — OUTREACH NOTE
General Surgery Week 3 Survey      Responses   Facility patient discharged from?  [facility]   Does the patient have one of the following disease processes/diagnoses(primary or secondary)?  General Surgery   Week 3 attempt successful?  Yes   Call start time  0940   Call end time  0946   Discharge diagnosis  s/p foot hardware removal, subtalar joint fusion, ankle subtalar arthrodesis, Pes cavus, hindfoot varus, acquired, left, pain from implanted hardware   Meds reviewed with patient/caregiver?  Yes   Is the patient having any side effects they believe may be caused by any medication additions or changes?  No   Does the patient have all medications related to this admission filled (includes all antibiotics, pain medications, etc.)  Yes   Is the patient taking all medications as directed (includes completed medication regime)?  Yes   Does the patient have a follow up appointment scheduled with their surgeon?  Yes   Has the patient kept scheduled appointments due by today?  Yes   Has home health visited the patient within 72 hours of discharge?  N/A   Psychosocial issues?  No   Comments  has permanent cast, in 4 weeks will have a boot   Did the patient receive a copy of their discharge instructions?  Yes   Nursing interventions  Reviewed instructions with patient   What is the patient's perception of their health status since discharge?  Improving   Nursing interventions  Nurse provided patient education   Is the patient /caregiver able to teach back basic post-op care?  Keep incision areas clean,dry and protected   Is the patient/caregiver able to teach back signs and symptoms of incisional infection?  Increased redness, swelling or pain at the incisonal site, Increased drainage or bleeding, Incisional warmth, Pus or odor from incision, Fever   Is the patient/caregiver able to teach back the hierarchy of who to call/visit for symptoms/problems? PCP, Specialist, Home health nurse, Urgent Care, ED, 911  Yes    Additional teach back comments  incision checked every 2 weeks, will progress to new cast, then boot, reviewed cast care, checking toes etc for proper circulation   Week 3 call completed?  Yes          Noemi Velazquez RN

## 2019-11-11 ENCOUNTER — TELEPHONE (OUTPATIENT)
Dept: FAMILY MEDICINE CLINIC | Facility: CLINIC | Age: 59
End: 2019-11-11

## 2019-11-11 DIAGNOSIS — M54.41 CHRONIC RIGHT-SIDED LOW BACK PAIN WITH RIGHT-SIDED SCIATICA: Chronic | ICD-10-CM

## 2019-11-11 DIAGNOSIS — G89.29 CHRONIC RIGHT-SIDED LOW BACK PAIN WITH RIGHT-SIDED SCIATICA: Chronic | ICD-10-CM

## 2019-11-11 RX ORDER — HYDROCODONE BITARTRATE AND ACETAMINOPHEN 7.5; 325 MG/1; MG/1
1 TABLET ORAL EVERY 4 HOURS PRN
Qty: 180 TABLET | Refills: 0 | Status: SHIPPED | OUTPATIENT
Start: 2019-11-11 | End: 2019-12-03 | Stop reason: SDUPTHER

## 2019-11-12 ENCOUNTER — OFFICE VISIT (OUTPATIENT)
Dept: PODIATRY | Facility: CLINIC | Age: 59
End: 2019-11-12

## 2019-11-12 VITALS — WEIGHT: 259 LBS | HEART RATE: 84 BPM | BODY MASS INDEX: 39.25 KG/M2 | HEIGHT: 68 IN | OXYGEN SATURATION: 99 %

## 2019-11-12 DIAGNOSIS — Q66.70 PES CAVUS: ICD-10-CM

## 2019-11-12 DIAGNOSIS — M21.172 SUBTALAR VARUS, ACQUIRED, LEFT: Primary | ICD-10-CM

## 2019-11-12 PROCEDURE — 99024 POSTOP FOLLOW-UP VISIT: CPT | Performed by: PODIATRIST

## 2019-11-12 PROCEDURE — 29405 APPL SHORT LEG CAST: CPT | Performed by: PODIATRIST

## 2019-11-12 NOTE — PROGRESS NOTES
Ariana Luis Martinez  1960  59 y.o. female   PCP: Kimberly Ferguson 9/10/19  BS - 92 this morning per patient     Patient presents today for a post op follow up on the left foot.     11/12/2019      Chief Complaint   Patient presents with   • Left Foot - Follow-up       History of Present Illness    Ms Martinez is a 60 y/o diabetic female who presents for follow-up of left subtalar joint arthrodesis, hardware removal and gastrocnemius recession.  She is doing well overall with improving postoperative pain.  She is nonweightbearing with the assistance of a wheelchair.    Past Medical History:   Diagnosis Date   • Angina, class IV (CMS/HCC)    • Anxiety    • Anxiety and depression    • Benign paroxysmal positional vertigo    • Bladder disorder     has bladder stimulator   • Carpal tunnel syndrome    • Chronic pain    • Coronary atherosclerosis     hx CABG 2005.  is followed by Dr Kwon   • Depression    • Diabetes mellitus (CMS/HCC)     Type 2, controlled   • Diabetic polyneuropathy (CMS/HCC)    • Elevated cholesterol    • Female stress incontinence    • Foot pain, left    • Full dentures    • Gastroparesis    • GERD (gastroesophageal reflux disease)    • Hyperlipidemia    • Hypertension    • Low back pain    • Malaise and fatigue    • Multiple joint pain    • Obesity     Refuses to be weighed   • Otalgia     Both   • Perforation of tympanic membrane     Left   • Postoperative wound infection    • Vitamin D deficiency    • Wears glasses     reading         Past Surgical History:   Procedure Laterality Date   • ABDOMINAL SURGERY     • ANGIOPLASTY      coronary   • BREAST BIOPSY Right    • CARDIAC CATHETERIZATION     • CARDIAC CATHETERIZATION N/A 6/20/2017    Procedure: Right Heart Cath;  Surgeon: Can Kwon MD PhD;  Location: [facility] INVASIVE LOCATION;  Service:    • CARPAL TUNNEL RELEASE     • CHOLECYSTECTOMY     • CORONARY ARTERY BYPASS GRAFT  2005   • ENDOSCOPY N/A 10/19/2018    Procedure:  ESOPHAGOGASTRODUODENOSCOPY possible dilation;  Surgeon: Julián Maldonado MD;  Location: [facility] ENDOSCOPY;  Service: Gastroenterology   • ENDOSCOPY AND COLONOSCOPY     • FOOT SURGERY      Toes   • GASTRIC BANDING      Revision, laparoscopic   • HYSTERECTOMY     • MOUTH SURGERY     • SALPINGO OOPHORECTOMY     • SHOULDER SURGERY     • SUBTALAR ARTHRODESIS Left 1/16/2019    Procedure: LEFT FOOT HARDWARE REMOVAL, FIFTH METATARSAL , OPEN REDUCTION INTERNAL FIXATION, CALCANEAL OSTEOTOMY;  Surgeon: Ignacio Lord DPM;  Location: [facility] OR;  Service: Podiatry   • SUBTALAR ARTHRODESIS Left 10/16/2019    Procedure: foot hardware removal, subtalar joint fusion  possible de/reattachment of achilles tendon        (c-arm);  Surgeon: Ignacio Lord DPM;  Location: [facility] OR;  Service: Podiatry   • TRANSESOPHAGEAL ECHOCARDIOGRAM (LAMONTE)      With color flow         Family History   Problem Relation Age of Onset   • Diabetes Other    • Heart disease Other    • Hypertension Other    • Heart disease Mother    • Stroke Mother    • Hypertension Mother    • Diabetes Sister    • Heart disease Sister    • Hypertension Sister    • Heart disease Sister    • Diabetes Sister    • Hypertension Sister    • Diabetes Sister    • Diabetes Sister    • Diabetes Sister    • Diabetes Sister          Social History     Socioeconomic History   • Marital status:      Spouse name: Not on file   • Number of children: Not on file   • Years of education: Not on file   • Highest education level: Not on file   Tobacco Use   • Smoking status: Never Smoker   • Smokeless tobacco: Never Used   Substance and Sexual Activity   • Alcohol use: No   • Drug use: No   • Sexual activity: Defer         Current Outpatient Medications   Medication Sig Dispense Refill   • ARIPiprazole (ABILIFY) 15 MG tablet Take 1 tablet by mouth Daily. 90 tablet 1   • aspirin 81 MG chewable tablet Chew 81 mg daily.     • atorvastatin (LIPITOR) 40 MG tablet Take 40 mg by mouth  Every Night.     • BD SHARPS CONTAINER HOME misc 1 each Take As Directed. 1 each 0   • Blood Glucose Monitoring Suppl (ONE TOUCH ULTRA MINI) w/Device kit USE AS DIRECTED TO CHECK BLOOD SUGAR 1 each 0   • Calcium Citrate-Vitamin D (CITRACAL/VITAMIN D) 250-200 MG-UNIT tablet Take 2 tablets by mouth 2 (two) times a day.     • clindamycin (CLEOCIN) 300 MG capsule Take 1 capsule by mouth 3 (Three) Times a Day. 30 capsule 0   • clopidogrel (PLAVIX) 75 MG tablet Take 75 mg by mouth Daily.     • cyanocobalamin 1000 MCG/ML injection INJECT 1 ML INTO THE APPROPRIATE MUSCLE AS DIRECTED BY PRESCRIBER EVERY 28 (TWENTY-EIGHT) DAYS. 1 mL 0   • gabapentin (NEURONTIN) 400 MG capsule Take 400 mg by mouth 3 (Three) Times a Day.     • GLUCAGON EMERGENCY 1 MG injection USE AS DIRECTED AS NEEDED 1 kit 0   • glucose blood (ONE TOUCH ULTRA TEST) test strip 1 each by Other route 4 (Four) Times a Day. Use as instructed 400 each 1   • hydroCHLOROthiazide (HYDRODIURIL) 12.5 MG tablet Take 12.5 mg by mouth Daily.     • HYDROcodone-acetaminophen (NORCO) 7.5-325 MG per tablet Take 1 tablet by mouth Every 4 (Four) Hours As Needed for Moderate Pain  or Severe Pain . 180 tablet 0   • insulin aspart (novoLOG FLEXPEN) 100 UNIT/ML solution pen-injector sc pen Inject 60 Units under the skin into the appropriate area as directed 3 (Three) Times a Day With Meals.     • Insulin Glargine (BASAGLAR KWIKPEN) 100 UNIT/ML injection pen Inject 100 Units under the skin into the appropriate area as directed Every Night. 5 pen 5   • Insulin Pen Needle (B-D ULTRAFINE III SHORT PEN) 31G X 8 MM misc USE TO INJECT 4 TIMES A  each 11   • LORazepam (ATIVAN) 0.5 MG tablet Take 1 tablet by mouth Daily As Needed for Anxiety. 30 tablet 0   • meclizine (ANTIVERT) 25 MG tablet Take 1 tablet by mouth 3 (Three) Times a Day As Needed for dizziness. 90 tablet 6   • metoclopramide (REGLAN) 10 MG tablet Take 10 mg by mouth 4 (Four) Times a Day As Needed.     • metoprolol  "succinate XL (TOPROL-XL) 25 MG 24 hr tablet TAKE 1 TABLET BY MOUTH DAILY. 31 tablet 6   • mirtazapine (REMERON) 45 MG tablet TAKE 1 TABLET BY MOUTH EVERY NIGHT. 90 tablet 0   • omeprazole (PRILOSEC) 40 MG capsule Take 1 capsule by mouth Daily. 90 capsule 1   • ONE TOUCH ULTRA TEST test strip USE TO TEST SUGAR 4 TIMES A  each 3   • Syringe/Needle, Disp, (LUER LOCK SAFETY SYRINGES) 25G X 5/8\" 3 ML misc 1 each Daily. 100 each 0   • venlafaxine (EFFEXOR) 100 MG tablet Take 1 tablet by mouth 2 (Two) Times a Day. 60 tablet 3   • vitamin D (ERGOCALCIFEROL) 78046 units capsule capsule Take 50,000 Units by mouth 1 (One) Time Per Week. sunday       No current facility-administered medications for this visit.      Review of Systems   Constitutional: Negative.    HENT: Negative.    Eyes: Negative.    Respiratory: Negative.    Cardiovascular: Negative.    Gastrointestinal: Negative.    Endocrine: Negative.    Genitourinary: Negative.    Musculoskeletal: Negative.         Left foot pain    Skin: Negative.    Allergic/Immunologic: Negative.    Neurological: Positive for numbness.   Hematological: Negative.    Psychiatric/Behavioral: Negative.          OBJECTIVE    Pulse 84   Ht 172.7 cm (68\")   Wt 117 kg (259 lb)   SpO2 99%   BMI 39.38 kg/m²         Physical Exam   Constitutional: she appears well-developed and well-nourished.   Psychiatric: she has a normal mood and affect. her   behavior is normal.      Lower Extremity Exam:  Neurovascular status intact  Cap Refill time brisk   Negative Conrad  Left foot and ankle incisions coapted with sutures in place.  No erythema.  No fluctuance.  No active drainage from incision sites.  Subtalar joint rectus, rigid    Procedures        ASSESSMENT AND PLAN    Ariana was seen today for follow-up.    Diagnoses and all orders for this visit:    Subtalar varus, acquired, left  -     XR Foot 3+ View Left    Pes cavus        -Doing well postoperatively with expected postoperative " swelling and pain.  -Radiographs ordered and reviewed  -Previous erythema resolved.  Remaining heel sutures removed today.  -Placed in a well-padded below-knee cast today.  -Continue strict nonweightbearing with assistance of wheelchair  -Recheck 2 weeks          This document has been electronically signed by Ignacio Lord DPM on November 12, 2019 3:38 PM

## 2019-11-25 RX ORDER — HYDROCHLOROTHIAZIDE 12.5 MG/1
CAPSULE, GELATIN COATED ORAL
Qty: 90 CAPSULE | Refills: 0 | OUTPATIENT
Start: 2019-11-25

## 2019-11-26 ENCOUNTER — OFFICE VISIT (OUTPATIENT)
Dept: PODIATRY | Facility: CLINIC | Age: 59
End: 2019-11-26

## 2019-11-26 VITALS — HEIGHT: 68 IN | HEART RATE: 108 BPM | BODY MASS INDEX: 39.25 KG/M2 | OXYGEN SATURATION: 98 % | WEIGHT: 259 LBS

## 2019-11-26 DIAGNOSIS — Z98.890 S/P HARDWARE REMOVAL: ICD-10-CM

## 2019-11-26 DIAGNOSIS — E53.8 CYANOCOBALAMIN DEFICIENCY: ICD-10-CM

## 2019-11-26 DIAGNOSIS — Q66.70 PES CAVUS: ICD-10-CM

## 2019-11-26 DIAGNOSIS — M21.172 SUBTALAR VARUS, ACQUIRED, LEFT: Primary | ICD-10-CM

## 2019-11-26 PROCEDURE — 29405 APPL SHORT LEG CAST: CPT | Performed by: PODIATRIST

## 2019-11-26 PROCEDURE — 99024 POSTOP FOLLOW-UP VISIT: CPT | Performed by: PODIATRIST

## 2019-11-26 RX ORDER — CYANOCOBALAMIN 1000 UG/ML
1000 INJECTION, SOLUTION INTRAMUSCULAR; SUBCUTANEOUS
Qty: 1 ML | Refills: 0 | Status: SHIPPED | OUTPATIENT
Start: 2019-11-26 | End: 2020-03-12

## 2019-11-27 RX ORDER — METOPROLOL SUCCINATE 25 MG/1
25 TABLET, EXTENDED RELEASE ORAL DAILY
Qty: 31 TABLET | Refills: 6 | Status: SHIPPED | OUTPATIENT
Start: 2019-11-27 | End: 2020-06-01

## 2019-12-03 ENCOUNTER — OFFICE VISIT (OUTPATIENT)
Dept: FAMILY MEDICINE CLINIC | Facility: CLINIC | Age: 59
End: 2019-12-03

## 2019-12-03 VITALS
WEIGHT: 259 LBS | HEART RATE: 72 BPM | RESPIRATION RATE: 16 BRPM | TEMPERATURE: 98.5 F | SYSTOLIC BLOOD PRESSURE: 123 MMHG | OXYGEN SATURATION: 99 % | DIASTOLIC BLOOD PRESSURE: 70 MMHG | BODY MASS INDEX: 39.25 KG/M2 | HEIGHT: 68 IN

## 2019-12-03 DIAGNOSIS — E78.2 MIXED HYPERLIPIDEMIA: Chronic | ICD-10-CM

## 2019-12-03 DIAGNOSIS — G89.29 CHRONIC RIGHT-SIDED LOW BACK PAIN WITH RIGHT-SIDED SCIATICA: Primary | Chronic | ICD-10-CM

## 2019-12-03 DIAGNOSIS — I10 ESSENTIAL HYPERTENSION: Chronic | ICD-10-CM

## 2019-12-03 DIAGNOSIS — M54.41 CHRONIC RIGHT-SIDED LOW BACK PAIN WITH RIGHT-SIDED SCIATICA: Primary | Chronic | ICD-10-CM

## 2019-12-03 DIAGNOSIS — Z23 IMMUNIZATION DUE: ICD-10-CM

## 2019-12-03 PROCEDURE — 99213 OFFICE O/P EST LOW 20 MIN: CPT | Performed by: NURSE PRACTITIONER

## 2019-12-03 RX ORDER — HYDROCODONE BITARTRATE AND ACETAMINOPHEN 7.5; 325 MG/1; MG/1
1 TABLET ORAL EVERY 4 HOURS PRN
Qty: 180 TABLET | Refills: 0 | Status: SHIPPED | OUTPATIENT
Start: 2019-12-03 | End: 2019-12-26 | Stop reason: SDUPTHER

## 2019-12-03 RX ORDER — FUROSEMIDE 40 MG/1
40 TABLET ORAL DAILY
Refills: 3 | COMMUNITY
Start: 2019-10-26 | End: 2020-04-20 | Stop reason: SDUPTHER

## 2019-12-03 RX ORDER — GABAPENTIN 400 MG/1
400 CAPSULE ORAL 3 TIMES DAILY
Qty: 90 CAPSULE | Refills: 2 | Status: SHIPPED | OUTPATIENT
Start: 2019-12-03 | End: 2020-05-11

## 2019-12-03 RX ORDER — PANTOPRAZOLE SODIUM 40 MG/1
40 TABLET, DELAYED RELEASE ORAL DAILY
Qty: 30 TABLET | Refills: 11 | Status: SHIPPED | OUTPATIENT
Start: 2019-12-03 | End: 2019-12-09

## 2019-12-03 RX ORDER — INFLUENZA A VIRUS A/MICHIGAN/45/2015 X-275 (H1N1) ANTIGEN (FORMALDEHYDE INACTIVATED), INFLUENZA A VIRUS A/SINGAPORE/INFIMH-16-0019/2016 IVR-186 (H3N2) ANTIGEN (FORMALDEHYDE INACTIVATED), INFLUENZA B VIRUS B/PHUKET/3073/2013 ANTIGEN (FORMALDEHYDE INACTIVATED), AND INFLUENZA B VIRUS B/MARYLAND/15/2016 BX-69A ANTIGEN (FORMALDEHYDE INACTIVATED) 15; 15; 15; 15 UG/.5ML; UG/.5ML; UG/.5ML; UG/.5ML
INJECTION, SUSPENSION INTRAMUSCULAR
Status: ON HOLD | COMMUNITY
Start: 2019-09-20 | End: 2020-02-16

## 2019-12-03 NOTE — PROGRESS NOTES
Chief Complaint   Patient presents with   • Pain     12 week f/u med reills     Subjective   Ariana Martinez is a 59 y.o. female who presents to the office for routine follow up of chronic low back pain requiring opioid pain mediations.     The following portions of the patient's history were reviewed and updated as appropriate: allergies, current medications, past family history, past medical history, past social history, past surgical history and problem list.    History of Present Illness   Labs: Ordered 09/2019, not collected. Due today and patient aware   UDS 09/24/2019- appropriate     Chronic low back pain: This is a chronic, ongoing issue that began over 5 years ago. The pain is in her lower back and radiates down her right side. She reports that there is no change in the pain from her previous encounter and that she is happy with her pain medications. Due for refill today   Last lumbar imaging 9/12/17  IMPRESSION:  1. No acute lumbar spine abnormality    Portions of ROS  and PE from most recent  Visit carried forward and updated as appropriate for current situation.  Past Medical History:   Diagnosis Date   • Angina, class IV (CMS/HCC)    • Anxiety    • Anxiety and depression    • Benign paroxysmal positional vertigo    • Bladder disorder     has bladder stimulator   • Carpal tunnel syndrome    • Chronic pain    • Coronary atherosclerosis     hx CABG 2005.  is followed by Dr Kwon   • Depression    • Diabetes mellitus (CMS/HCC)     Type 2, controlled   • Diabetic polyneuropathy (CMS/HCC)    • Elevated cholesterol    • Female stress incontinence    • Foot pain, left    • Full dentures    • Gastroparesis    • GERD (gastroesophageal reflux disease)    • Hyperlipidemia    • Hypertension    • Low back pain    • Malaise and fatigue    • Multiple joint pain    • Obesity     Refuses to be weighed   • Otalgia     Both   • Perforation of tympanic membrane     Left   • Postoperative wound infection    • Vitamin  "D deficiency    • Wears glasses     reading          Family History   Problem Relation Age of Onset   • Diabetes Other    • Heart disease Other    • Hypertension Other    • Heart disease Mother    • Stroke Mother    • Hypertension Mother    • Diabetes Sister    • Heart disease Sister    • Hypertension Sister    • Heart disease Sister    • Diabetes Sister    • Hypertension Sister    • Diabetes Sister    • Diabetes Sister    • Diabetes Sister    • Diabetes Sister         Review of Systems   Constitutional: Negative for appetite change, chills, fatigue and fever.   HENT: Negative for congestion, ear pain, rhinorrhea and sore throat.    Eyes: Negative for pain.   Respiratory: Negative for cough and shortness of breath.    Cardiovascular: Negative for chest pain and palpitations.   Gastrointestinal: Negative for abdominal pain, constipation, diarrhea and nausea.   Genitourinary: Negative for dysuria.   Musculoskeletal: Positive for arthralgias and back pain. Negative for joint swelling and neck pain.   Skin: Negative for rash.   Neurological: Positive for numbness (BLE, chronic polyneuropathy includes feet.). Negative for dizziness and headaches.   Psychiatric/Behavioral: The patient is nervous/anxious.    All other systems reviewed and are negative.      Objective   Vitals:    12/03/19 0948   BP: 123/70   BP Location: Left arm   Patient Position: Sitting   Cuff Size: Adult   Pulse: 72   Resp: 16   Temp: 98.5 °F (36.9 °C)   TempSrc: Tympanic   SpO2: 99%   Weight: 117 kg (259 lb)   Height: 172.7 cm (68\")   PainSc:   5   PainLoc: Foot     Physical Exam   Constitutional: She is oriented to person, place, and time. She appears well-developed and well-nourished.   HENT:   Head: Normocephalic and atraumatic.   Eyes: Pupils are equal, round, and reactive to light.   Neck: Normal range of motion. Neck supple.   Cardiovascular: Normal rate, regular rhythm and normal heart sounds.   Pulmonary/Chest: Effort normal and breath sounds " normal. No respiratory distress. She has no wheezes. She has no rales.   Abdominal: Soft. Bowel sounds are normal.   Musculoskeletal: She exhibits tenderness (left foot, right lower back). She exhibits no edema.        Back:    Neurological: She is alert and oriented to person, place, and time.   Skin: Skin is warm and dry. Capillary refill takes 2 to 3 seconds.   Psychiatric: She has a normal mood and affect.   Nursing note and vitals reviewed.      Assessment/Plan   Ariana was seen today for pain.    Diagnoses and all orders for this visit:    Chronic right-sided low back pain with right-sided sciatica  Comments:  controlled with Norco  Orders:  -     HYDROcodone-acetaminophen (NORCO) 7.5-325 MG per tablet; Take 1 tablet by mouth Every 4 (Four) Hours As Needed for Moderate Pain  or Severe Pain .  -     gabapentin (NEURONTIN) 400 MG capsule; Take 1 capsule by mouth 3 (Three) Times a Day.    Mixed hyperlipidemia  -     Comprehensive Metabolic Panel  -     Lipid Panel    Essential hypertension    Immunization due    Other orders  -     Cancel: ToxASSURE Select 13 Discrete -           PHQ-2/PHQ-9 Depression Screening 12/3/2019   Little interest or pleasure in doing things 0   Feeling down, depressed, or hopeless 0   Trouble falling or staying asleep, or sleeping too much 0   Feeling tired or having little energy 0   Poor appetite or overeating 0   Feeling bad about yourself - or that you are a failure or have let yourself or your family down 0   Trouble concentrating on things, such as reading the newspaper or watching television 0   Moving or speaking so slowly that other people could have noticed. Or the opposite - being so fidgety or restless that you have been moving around a lot more than usual 0   Thoughts that you would be better off dead, or of hurting yourself in some way 0   Total Score 0   If you checked off any problems, how difficult have these problems made it for you to do your work, take care of things  at home, or get along with other people? -   Patient understands the risks associated with this controlled medication, including tolerance and addiction.  Patient also agrees to only obtain this medication from me, and not from a another provider, unless that provider is covering for me in my absence.  Patient also agrees to be compliant in dosing, and not self adjust the dose of medication.  A signed controlled substance agreement is on file, and the patient has received a controlled substance education sheet at this a previous visit.  The patient has also signed a consent for treatment with a controlled substance as per [facility] policy. LESTER was obtained.      BEBE Palomino         Return in about 3 months (around 3/3/2020).    There are no Patient Instructions on file for this visit.

## 2019-12-09 ENCOUNTER — LAB (OUTPATIENT)
Dept: LAB | Facility: HOSPITAL | Age: 59
End: 2019-12-09

## 2019-12-09 DIAGNOSIS — K21.9 GASTROESOPHAGEAL REFLUX DISEASE, ESOPHAGITIS PRESENCE NOT SPECIFIED: Primary | ICD-10-CM

## 2019-12-09 DIAGNOSIS — D75.839 THROMBOCYTOSIS: ICD-10-CM

## 2019-12-09 DIAGNOSIS — D72.828 OTHER ELEVATED WHITE BLOOD CELL (WBC) COUNT: ICD-10-CM

## 2019-12-09 LAB
25(OH)D3 SERPL-MCNC: 30.5 NG/ML (ref 30–100)
ALBUMIN SERPL-MCNC: 4.2 G/DL (ref 3.5–5.2)
ALBUMIN UR-MCNC: 10.5 MG/DL
ALBUMIN/GLOB SERPL: 1.2 G/DL
ALP SERPL-CCNC: 110 U/L (ref 39–117)
ALT SERPL W P-5'-P-CCNC: 24 U/L (ref 1–33)
ANION GAP SERPL CALCULATED.3IONS-SCNC: 16.7 MMOL/L (ref 5–15)
AST SERPL-CCNC: 18 U/L (ref 1–32)
BASOPHILS # BLD AUTO: 0.08 10*3/MM3 (ref 0–0.2)
BASOPHILS NFR BLD AUTO: 0.8 % (ref 0–1.5)
BILIRUB SERPL-MCNC: 0.4 MG/DL (ref 0.2–1.2)
BUN BLD-MCNC: 9 MG/DL (ref 6–20)
BUN/CREAT SERPL: 9 (ref 7–25)
CALCIUM SPEC-SCNC: 9.9 MG/DL (ref 8.6–10.5)
CHLORIDE SERPL-SCNC: 99 MMOL/L (ref 98–107)
CHOLEST SERPL-MCNC: 203 MG/DL (ref 0–200)
CO2 SERPL-SCNC: 24.3 MMOL/L (ref 22–29)
CREAT BLD-MCNC: 1 MG/DL (ref 0.57–1)
CREAT UR-MCNC: 204.6 MG/DL
CREAT UR-MCNC: 204.6 MG/DL
DEPRECATED RDW RBC AUTO: 46.8 FL (ref 37–54)
EOSINOPHIL # BLD AUTO: 0.34 10*3/MM3 (ref 0–0.4)
EOSINOPHIL NFR BLD AUTO: 3.5 % (ref 0.3–6.2)
ERYTHROCYTE [DISTWIDTH] IN BLOOD BY AUTOMATED COUNT: 14.9 % (ref 12.3–15.4)
FERRITIN SERPL-MCNC: 251.5 NG/ML (ref 13–150)
GFR SERPL CREATININE-BSD FRML MDRD: 57 ML/MIN/1.73
GLOBULIN UR ELPH-MCNC: 3.4 GM/DL
GLUCOSE BLD-MCNC: 121 MG/DL (ref 65–99)
HBA1C MFR BLD: 7.79 % (ref 4.8–5.6)
HCT VFR BLD AUTO: 41 % (ref 34–46.6)
HDLC SERPL-MCNC: 42 MG/DL (ref 40–60)
HGB BLD-MCNC: 14.5 G/DL (ref 12–15.9)
IMM GRANULOCYTES # BLD AUTO: 0.04 10*3/MM3 (ref 0–0.05)
IMM GRANULOCYTES NFR BLD AUTO: 0.4 % (ref 0–0.5)
IRON 24H UR-MRATE: 74 MCG/DL (ref 37–145)
IRON SATN MFR SERPL: 26 % (ref 20–50)
LDLC SERPL CALC-MCNC: 125 MG/DL (ref 0–100)
LDLC/HDLC SERPL: 2.98 {RATIO}
LYMPHOCYTES # BLD AUTO: 2.68 10*3/MM3 (ref 0.7–3.1)
LYMPHOCYTES NFR BLD AUTO: 27.3 % (ref 19.6–45.3)
MCH RBC QN AUTO: 31 PG (ref 26.6–33)
MCHC RBC AUTO-ENTMCNC: 35.4 G/DL (ref 31.5–35.7)
MCV RBC AUTO: 87.8 FL (ref 79–97)
MICROALBUMIN/CREAT UR: 51.3 MG/G
MONOCYTES # BLD AUTO: 0.71 10*3/MM3 (ref 0.1–0.9)
MONOCYTES NFR BLD AUTO: 7.2 % (ref 5–12)
NEUTROPHILS # BLD AUTO: 5.98 10*3/MM3 (ref 1.7–7)
NEUTROPHILS NFR BLD AUTO: 60.8 % (ref 42.7–76)
NRBC BLD AUTO-RTO: 0 /100 WBC (ref 0–0.2)
PLATELET # BLD AUTO: 407 10*3/MM3 (ref 140–450)
PMV BLD AUTO: 10 FL (ref 6–12)
POTASSIUM BLD-SCNC: 3.1 MMOL/L (ref 3.5–5.2)
PROT SERPL-MCNC: 7.6 G/DL (ref 6–8.5)
PROT UR-MCNC: 34 MG/DL
PROT/CREAT UR: 166.2 MG/G CREA (ref 0–200)
RBC # BLD AUTO: 4.67 10*6/MM3 (ref 3.77–5.28)
SODIUM BLD-SCNC: 140 MMOL/L (ref 136–145)
TIBC SERPL-MCNC: 285 MCG/DL (ref 298–536)
TRANSFERRIN SERPL-MCNC: 191 MG/DL (ref 200–360)
TRIGL SERPL-MCNC: 180 MG/DL (ref 0–150)
TSH SERPL DL<=0.05 MIU/L-ACNC: 3.58 UIU/ML (ref 0.27–4.2)
VLDLC SERPL-MCNC: 36 MG/DL (ref 5–40)
WBC NRBC COR # BLD: 9.83 10*3/MM3 (ref 3.4–10.8)

## 2019-12-09 PROCEDURE — 36415 COLL VENOUS BLD VENIPUNCTURE: CPT | Performed by: NURSE PRACTITIONER

## 2019-12-09 PROCEDURE — 83036 HEMOGLOBIN GLYCOSYLATED A1C: CPT | Performed by: NURSE PRACTITIONER

## 2019-12-09 PROCEDURE — 82043 UR ALBUMIN QUANTITATIVE: CPT | Performed by: NURSE PRACTITIONER

## 2019-12-09 PROCEDURE — 84156 ASSAY OF PROTEIN URINE: CPT | Performed by: NURSE PRACTITIONER

## 2019-12-09 PROCEDURE — 83540 ASSAY OF IRON: CPT

## 2019-12-09 PROCEDURE — 85025 COMPLETE CBC W/AUTO DIFF WBC: CPT | Performed by: NURSE PRACTITIONER

## 2019-12-09 PROCEDURE — 82728 ASSAY OF FERRITIN: CPT

## 2019-12-09 PROCEDURE — 84443 ASSAY THYROID STIM HORMONE: CPT | Performed by: NURSE PRACTITIONER

## 2019-12-09 PROCEDURE — 84466 ASSAY OF TRANSFERRIN: CPT

## 2019-12-09 PROCEDURE — 82570 ASSAY OF URINE CREATININE: CPT | Performed by: NURSE PRACTITIONER

## 2019-12-09 PROCEDURE — 80061 LIPID PANEL: CPT | Performed by: NURSE PRACTITIONER

## 2019-12-09 PROCEDURE — 82306 VITAMIN D 25 HYDROXY: CPT | Performed by: NURSE PRACTITIONER

## 2019-12-09 PROCEDURE — 80053 COMPREHEN METABOLIC PANEL: CPT | Performed by: NURSE PRACTITIONER

## 2019-12-09 RX ORDER — RABEPRAZOLE SODIUM 20 MG/1
20 TABLET, DELAYED RELEASE ORAL DAILY
Qty: 30 TABLET | Refills: 5 | Status: ON HOLD | OUTPATIENT
Start: 2019-12-09 | End: 2020-02-16

## 2019-12-09 RX ORDER — HYDROCHLOROTHIAZIDE 12.5 MG/1
CAPSULE, GELATIN COATED ORAL
Qty: 90 CAPSULE | Refills: 0 | OUTPATIENT
Start: 2019-12-09

## 2019-12-10 ENCOUNTER — OFFICE VISIT (OUTPATIENT)
Dept: PODIATRY | Facility: CLINIC | Age: 59
End: 2019-12-10

## 2019-12-10 VITALS — OXYGEN SATURATION: 98 % | BODY MASS INDEX: 39.25 KG/M2 | HEART RATE: 63 BPM | HEIGHT: 68 IN | WEIGHT: 259 LBS

## 2019-12-10 DIAGNOSIS — M21.172 SUBTALAR VARUS, ACQUIRED, LEFT: Primary | ICD-10-CM

## 2019-12-10 DIAGNOSIS — Z98.890 S/P HARDWARE REMOVAL: ICD-10-CM

## 2019-12-10 DIAGNOSIS — Q66.70 PES CAVUS: ICD-10-CM

## 2019-12-10 PROCEDURE — 99024 POSTOP FOLLOW-UP VISIT: CPT | Performed by: PODIATRIST

## 2019-12-10 NOTE — PROGRESS NOTES
Arianajudy Martinez  1960  59 y.o. female   PCP: Kimberly Ferguson 12/06/19  BS - 138 this morning per patient     Patient presents today for a post op follow up on the left foot.     12/10/2019      Chief Complaint   Patient presents with   • Left Foot - Post-op Follow-up       History of Present Illness    Ms Martinez is a 60 y/o diabetic female who presents for follow-up of left subtalar joint arthrodesis, hardware removal and gastrocnemius recession.  She is doing well overall with no reported pain today.  She is nonweightbearing with the assistance of a wheelchair.    Past Medical History:   Diagnosis Date   • Angina, class IV (CMS/[facility])    • Anxiety    • Anxiety and depression    • Benign paroxysmal positional vertigo    • Bladder disorder     has bladder stimulator   • Carpal tunnel syndrome    • Chronic pain    • Coronary atherosclerosis     hx CABG 2005.  is followed by Dr Kwon   • Depression    • Diabetes mellitus (CMS/[facility])     Type 2, controlled   • Diabetic polyneuropathy (CMS/[facility])    • Elevated cholesterol    • Female stress incontinence    • Foot pain, left    • Full dentures    • Gastroparesis    • GERD (gastroesophageal reflux disease)    • Hyperlipidemia    • Hypertension    • Low back pain    • Malaise and fatigue    • Multiple joint pain    • Obesity     Refuses to be weighed   • Otalgia     Both   • Perforation of tympanic membrane     Left   • Postoperative wound infection    • Vitamin D deficiency    • Wears glasses     reading         Past Surgical History:   Procedure Laterality Date   • ABDOMINAL SURGERY     • ANGIOPLASTY      coronary   • BREAST BIOPSY Right    • CARDIAC CATHETERIZATION     • CARDIAC CATHETERIZATION N/A 6/20/2017    Procedure: Right Heart Cath;  Surgeon: Can Kwon MD PhD;  Location: [facility] INVASIVE LOCATION;  Service:    • CARPAL TUNNEL RELEASE     • CHOLECYSTECTOMY     • CORONARY ARTERY BYPASS GRAFT  2005   • ENDOSCOPY N/A 10/19/2018    Procedure:  ESOPHAGOGASTRODUODENOSCOPY possible dilation;  Surgeon: Julián Maldonado MD;  Location: [facility] ENDOSCOPY;  Service: Gastroenterology   • ENDOSCOPY AND COLONOSCOPY     • FOOT SURGERY      Toes   • GASTRIC BANDING      Revision, laparoscopic   • HYSTERECTOMY     • MOUTH SURGERY     • SALPINGO OOPHORECTOMY     • SHOULDER SURGERY     • SUBTALAR ARTHRODESIS Left 1/16/2019    Procedure: LEFT FOOT HARDWARE REMOVAL, FIFTH METATARSAL , OPEN REDUCTION INTERNAL FIXATION, CALCANEAL OSTEOTOMY;  Surgeon: Ignacio Lord DPM;  Location: [facility] OR;  Service: Podiatry   • SUBTALAR ARTHRODESIS Left 10/16/2019    Procedure: foot hardware removal, subtalar joint fusion  possible de/reattachment of achilles tendon        (c-arm);  Surgeon: Ignacio Lord DPM;  Location: [facility] OR;  Service: Podiatry   • TRANSESOPHAGEAL ECHOCARDIOGRAM (LAMONTE)      With color flow         Family History   Problem Relation Age of Onset   • Diabetes Other    • Heart disease Other    • Hypertension Other    • Heart disease Mother    • Stroke Mother    • Hypertension Mother    • Diabetes Sister    • Heart disease Sister    • Hypertension Sister    • Heart disease Sister    • Diabetes Sister    • Hypertension Sister    • Diabetes Sister    • Diabetes Sister    • Diabetes Sister    • Diabetes Sister          Social History     Socioeconomic History   • Marital status:      Spouse name: Not on file   • Number of children: Not on file   • Years of education: Not on file   • Highest education level: Not on file   Tobacco Use   • Smoking status: Never Smoker   • Smokeless tobacco: Never Used   Substance and Sexual Activity   • Alcohol use: No   • Drug use: No   • Sexual activity: Defer         Current Outpatient Medications   Medication Sig Dispense Refill   • ARIPiprazole (ABILIFY) 15 MG tablet Take 1 tablet by mouth Daily. 90 tablet 1   • aspirin 81 MG chewable tablet Chew 81 mg daily.     • atorvastatin (LIPITOR) 40 MG tablet Take 40 mg by mouth  Every Night.     • BD SHARPS CONTAINER HOME misc 1 each Take As Directed. 1 each 0   • Blood Glucose Monitoring Suppl (ONE TOUCH ULTRA MINI) w/Device kit USE AS DIRECTED TO CHECK BLOOD SUGAR 1 each 0   • Calcium Citrate-Vitamin D (CITRACAL/VITAMIN D) 250-200 MG-UNIT tablet Take 2 tablets by mouth 2 (two) times a day.     • clopidogrel (PLAVIX) 75 MG tablet Take 75 mg by mouth Daily.     • cyanocobalamin 1000 MCG/ML injection INJECT 1 ML INTO THE APPROPRIATE MUSCLE AS DIRECTED BY PRESCRIBER EVERY 28 (TWENTY-EIGHT) DAYS. 1 mL 0   • FLUZONE QUADRIVALENT 0.5 ML suspension prefilled syringe injection      • furosemide (LASIX) 40 MG tablet Take 40 mg by mouth Daily.  3   • gabapentin (NEURONTIN) 400 MG capsule Take 1 capsule by mouth 3 (Three) Times a Day. 90 capsule 2   • GLUCAGON EMERGENCY 1 MG injection USE AS DIRECTED AS NEEDED 1 kit 0   • glucose blood (ONE TOUCH ULTRA TEST) test strip 1 each by Other route 4 (Four) Times a Day. Use as instructed 400 each 1   • hydroCHLOROthiazide (HYDRODIURIL) 12.5 MG tablet Take 12.5 mg by mouth Daily.     • HYDROcodone-acetaminophen (NORCO) 7.5-325 MG per tablet Take 1 tablet by mouth Every 4 (Four) Hours As Needed for Moderate Pain  or Severe Pain . 180 tablet 0   • insulin aspart (novoLOG FLEXPEN) 100 UNIT/ML solution pen-injector sc pen Inject 60 Units under the skin into the appropriate area as directed 3 (Three) Times a Day With Meals.     • Insulin Glargine (BASAGLAR KWIKPEN) 100 UNIT/ML injection pen Inject 100 Units under the skin into the appropriate area as directed Every Night. 5 pen 5   • Insulin Pen Needle (B-D ULTRAFINE III SHORT PEN) 31G X 8 MM misc USE TO INJECT 4 TIMES A  each 11   • LORazepam (ATIVAN) 0.5 MG tablet Take 1 tablet by mouth Daily As Needed for Anxiety. 30 tablet 0   • meclizine (ANTIVERT) 25 MG tablet Take 1 tablet by mouth 3 (Three) Times a Day As Needed for dizziness. 90 tablet 6   • metoclopramide (REGLAN) 10 MG tablet Take 10 mg by mouth  "4 (Four) Times a Day As Needed.     • metoprolol succinate XL (TOPROL-XL) 25 MG 24 hr tablet TAKE 1 TABLET BY MOUTH DAILY. 31 tablet 6   • mirtazapine (REMERON) 45 MG tablet TAKE 1 TABLET BY MOUTH EVERY NIGHT. 90 tablet 0   • ONE TOUCH ULTRA TEST test strip USE TO TEST SUGAR 4 TIMES A  each 3   • RABEprazole (ACIPHEX) 20 MG EC tablet Take 1 tablet by mouth Daily. For acid reflux 30 tablet 5   • Syringe/Needle, Disp, (LUER LOCK SAFETY SYRINGES) 25G X 5/8\" 3 ML misc 1 each Daily. 100 each 0   • venlafaxine (EFFEXOR) 100 MG tablet Take 1 tablet by mouth 2 (Two) Times a Day. 60 tablet 3   • vitamin D (ERGOCALCIFEROL) 28163 units capsule capsule Take 50,000 Units by mouth 1 (One) Time Per Week. sunday       No current facility-administered medications for this visit.      Review of Systems   Constitutional: Negative.    HENT: Negative.    Eyes: Negative.    Respiratory: Negative.    Cardiovascular: Negative.    Gastrointestinal: Negative.    Endocrine: Negative.    Genitourinary: Negative.    Musculoskeletal: Negative.         Left foot pain    Skin: Negative.    Allergic/Immunologic: Negative.    Neurological: Positive for numbness.   Hematological: Negative.    Psychiatric/Behavioral: Negative.          OBJECTIVE    Pulse 63   Ht 172.7 cm (68\")   Wt 117 kg (259 lb)   SpO2 98%   BMI 39.38 kg/m²         Physical Exam   Constitutional: she appears well-developed and well-nourished.   Psychiatric: she has a normal mood and affect. her   behavior is normal.      Lower Extremity Exam:  Neurovascular status intact  Cap Refill time brisk   Negative Conrad  Left foot and ankle incisions healed.  No erythema.  No fluctuance.  No active drainage from incision sites.  Subtalar joint rectus, rigid    Procedures        ASSESSMENT AND PLAN    Ariana was seen today for post-op follow-up.    Diagnoses and all orders for this visit:    Subtalar varus, acquired, left  -     XR Foot 3+ View Left        -Doing well " postoperatively with expected postoperative swelling and pain.  -Radiographs ordered and reviewed  -Transition to CAM boot today, may advance WBAT  -Recheck 2 weeks          This document has been electronically signed by Maira Epstein MA on December 10, 2019 2:20 PM

## 2019-12-11 ENCOUNTER — OFFICE VISIT (OUTPATIENT)
Dept: ENDOCRINOLOGY | Facility: CLINIC | Age: 59
End: 2019-12-11

## 2019-12-11 VITALS
SYSTOLIC BLOOD PRESSURE: 126 MMHG | OXYGEN SATURATION: 97 % | BODY MASS INDEX: 38.31 KG/M2 | HEART RATE: 100 BPM | WEIGHT: 252.8 LBS | HEIGHT: 68 IN | DIASTOLIC BLOOD PRESSURE: 78 MMHG

## 2019-12-11 DIAGNOSIS — E78.2 MIXED HYPERLIPIDEMIA: ICD-10-CM

## 2019-12-11 DIAGNOSIS — E55.9 VITAMIN D DEFICIENCY: ICD-10-CM

## 2019-12-11 DIAGNOSIS — IMO0002 UNCONTROLLED TYPE 2 DIABETES MELLITUS WITH NEUROLOGIC COMPLICATION, WITH LONG-TERM CURRENT USE OF INSULIN: Primary | ICD-10-CM

## 2019-12-11 DIAGNOSIS — I10 ESSENTIAL HYPERTENSION: ICD-10-CM

## 2019-12-11 PROCEDURE — 99214 OFFICE O/P EST MOD 30 MIN: CPT | Performed by: NURSE PRACTITIONER

## 2019-12-11 RX ORDER — INSULIN GLARGINE 100 [IU]/ML
100 INJECTION, SOLUTION SUBCUTANEOUS NIGHTLY
Qty: 6 PEN | Refills: 11 | Status: SHIPPED | OUTPATIENT
Start: 2019-12-11 | End: 2020-06-09

## 2019-12-11 NOTE — PROGRESS NOTES
Subjective    Ariana Martinez is a 59 y.o. female. she is here today for follow-up.    History of Present Illness       Duration/Timing: Diabetes mellitus type 2, Age at onset of diabetes: 26 years years, Onset of symptoms gradual  timing - constant     qualtiy - uncontrolled     severity - moderate     patient broke left foot   -----------------------     Severity (Complications/Hospitalizations)  Secondary Macrovascular Complications: No CAD, No CVA, No PAD  Secondary Microvascular Complications: No Diabetic Nephropathy, No Diabetic Retinopathy, Diabetic Neuropathy     Context  Diabetes Regimen: Insulin, Oral Medications              Lab Results   Component Value Date    HGBA1C 7.79 (H) 12/09/2019                      Blood Glucose Readings     Now on dexcom sensor         Did not bring to office         This am 240      States ate late last night      Has had low in the middle of the night she did decrease Basaglar and has no lows since decrease               Diet  variable carb intake  Exercise: Exercises  walking     Associated Signs/Symptoms  Hyperglycemic Symptoms: No polyuria, No polydipsia, No polyphagia, No weight gain  Hypoglycemic Episodes: Documented symptomatic hypoglycemia, Seizures and syncope related to hypoglycemia              The following portions of the patient's history were reviewed and updated as appropriate:   Past Medical History:   Diagnosis Date   • Angina, class IV (CMS/HCC)    • Anxiety    • Anxiety and depression    • Benign paroxysmal positional vertigo    • Bladder disorder     has bladder stimulator   • Carpal tunnel syndrome    • Chronic pain    • Coronary atherosclerosis     hx CABG 2005.  is followed by Dr Kwon   • Depression    • Diabetes mellitus (CMS/HCC)     Type 2, controlled   • Diabetic polyneuropathy (CMS/HCC)    • Elevated cholesterol    • Female stress incontinence    • Foot pain, left    • Full dentures    • Gastroparesis    • GERD (gastroesophageal reflux  disease)    • Hyperlipidemia    • Hypertension    • Low back pain    • Malaise and fatigue    • Multiple joint pain    • Obesity     Refuses to be weighed   • Otalgia     Both   • Perforation of tympanic membrane     Left   • Postoperative wound infection    • Vitamin D deficiency    • Wears glasses     reading     Past Surgical History:   Procedure Laterality Date   • ABDOMINAL SURGERY     • ANGIOPLASTY      coronary   • BREAST BIOPSY Right    • CARDIAC CATHETERIZATION     • CARDIAC CATHETERIZATION N/A 6/20/2017    Procedure: Right Heart Cath;  Surgeon: Can Kwon MD PhD;  Location: [facility] CATH INVASIVE LOCATION;  Service:    • CARPAL TUNNEL RELEASE     • CHOLECYSTECTOMY     • CORONARY ARTERY BYPASS GRAFT  2005   • ENDOSCOPY N/A 10/19/2018    Procedure: ESOPHAGOGASTRODUODENOSCOPY possible dilation;  Surgeon: Julián Maldonado MD;  Location: [facility] ENDOSCOPY;  Service: Gastroenterology   • ENDOSCOPY AND COLONOSCOPY     • FOOT SURGERY      Toes   • GASTRIC BANDING      Revision, laparoscopic   • HYSTERECTOMY     • MOUTH SURGERY     • SALPINGO OOPHORECTOMY     • SHOULDER SURGERY     • SUBTALAR ARTHRODESIS Left 1/16/2019    Procedure: LEFT FOOT HARDWARE REMOVAL, FIFTH METATARSAL , OPEN REDUCTION INTERNAL FIXATION, CALCANEAL OSTEOTOMY;  Surgeon: Ignacio Lord DPM;  Location: [facility] OR;  Service: Podiatry   • SUBTALAR ARTHRODESIS Left 10/16/2019    Procedure: foot hardware removal, subtalar joint fusion  possible de/reattachment of achilles tendon        (c-arm);  Surgeon: Ignacio Lord DPM;  Location: [facility] OR;  Service: Podiatry   • TRANSESOPHAGEAL ECHOCARDIOGRAM (LAMONTE)      With color flow     Family History   Problem Relation Age of Onset   • Diabetes Other    • Heart disease Other    • Hypertension Other    • Heart disease Mother    • Stroke Mother    • Hypertension Mother    • Diabetes Sister    • Heart disease Sister    • Hypertension Sister    • Heart disease Sister    • Diabetes Sister     • Hypertension Sister    • Diabetes Sister    • Diabetes Sister    • Diabetes Sister    • Diabetes Sister      OB History        0    Para   0    Term   0       0    AB   0    Living   0       SAB   0    TAB   0    Ectopic   0    Molar        Multiple   0    Live Births                  Current Outpatient Medications   Medication Sig Dispense Refill   • ARIPiprazole (ABILIFY) 15 MG tablet Take 1 tablet by mouth Daily. 90 tablet 1   • aspirin 81 MG chewable tablet Chew 81 mg daily.     • atorvastatin (LIPITOR) 40 MG tablet Take 40 mg by mouth Every Night.     • BD SHARPS CONTAINER HOME misc 1 each Take As Directed. 1 each 0   • Blood Glucose Monitoring Suppl (ONE TOUCH ULTRA MINI) w/Device kit USE AS DIRECTED TO CHECK BLOOD SUGAR 1 each 0   • Calcium Citrate-Vitamin D (CITRACAL/VITAMIN D) 250-200 MG-UNIT tablet Take 2 tablets by mouth 2 (two) times a day.     • clopidogrel (PLAVIX) 75 MG tablet Take 75 mg by mouth Daily.     • cyanocobalamin 1000 MCG/ML injection INJECT 1 ML INTO THE APPROPRIATE MUSCLE AS DIRECTED BY PRESCRIBER EVERY 28 (TWENTY-EIGHT) DAYS. 1 mL 0   • FLUZONE QUADRIVALENT 0.5 ML suspension prefilled syringe injection      • furosemide (LASIX) 40 MG tablet Take 40 mg by mouth Daily.  3   • gabapentin (NEURONTIN) 400 MG capsule Take 1 capsule by mouth 3 (Three) Times a Day. 90 capsule 2   • GLUCAGON EMERGENCY 1 MG injection USE AS DIRECTED AS NEEDED 1 kit 0   • glucose blood (ONE TOUCH ULTRA TEST) test strip 1 each by Other route 4 (Four) Times a Day. Use as instructed 400 each 1   • hydroCHLOROthiazide (HYDRODIURIL) 12.5 MG tablet Take 12.5 mg by mouth Daily.     • HYDROcodone-acetaminophen (NORCO) 7.5-325 MG per tablet Take 1 tablet by mouth Every 4 (Four) Hours As Needed for Moderate Pain  or Severe Pain . 180 tablet 0   • insulin aspart (novoLOG FLEXPEN) 100 UNIT/ML solution pen-injector sc pen Inject 60 Units under the skin into the appropriate area as directed 3 (Three) Times a  "Day With Meals.     • Insulin Glargine (BASAGLAR KWIKPEN) 100 UNIT/ML injection pen Inject 100 Units under the skin into the appropriate area as directed Every Night. 6 pen 11   • Insulin Pen Needle (B-D ULTRAFINE III SHORT PEN) 31G X 8 MM misc USE TO INJECT 4 TIMES A  each 11   • LORazepam (ATIVAN) 0.5 MG tablet Take 1 tablet by mouth Daily As Needed for Anxiety. 30 tablet 0   • meclizine (ANTIVERT) 25 MG tablet Take 1 tablet by mouth 3 (Three) Times a Day As Needed for dizziness. 90 tablet 6   • metoclopramide (REGLAN) 10 MG tablet Take 10 mg by mouth 4 (Four) Times a Day As Needed.     • metoprolol succinate XL (TOPROL-XL) 25 MG 24 hr tablet TAKE 1 TABLET BY MOUTH DAILY. 31 tablet 6   • mirtazapine (REMERON) 45 MG tablet TAKE 1 TABLET BY MOUTH EVERY NIGHT. 90 tablet 0   • ONE TOUCH ULTRA TEST test strip USE TO TEST SUGAR 4 TIMES A  each 3   • RABEprazole (ACIPHEX) 20 MG EC tablet Take 1 tablet by mouth Daily. For acid reflux 30 tablet 5   • Syringe/Needle, Disp, (LUER LOCK SAFETY SYRINGES) 25G X 5/8\" 3 ML misc 1 each Daily. 100 each 0   • venlafaxine (EFFEXOR) 100 MG tablet Take 1 tablet by mouth 2 (Two) Times a Day. 60 tablet 3   • vitamin D (ERGOCALCIFEROL) 32583 units capsule capsule Take 50,000 Units by mouth 1 (One) Time Per Week. sunday       No current facility-administered medications for this visit.      Allergies   Allergen Reactions   • Seroquel [Quetiapine] Anaphylaxis   • Avandia [Rosiglitazone] Swelling   • Morphine And Related Hallucinations   • Oxycodone Hallucinations     Social History     Socioeconomic History   • Marital status:      Spouse name: Not on file   • Number of children: Not on file   • Years of education: Not on file   • Highest education level: Not on file   Tobacco Use   • Smoking status: Never Smoker   • Smokeless tobacco: Never Used   Substance and Sexual Activity   • Alcohol use: No   • Drug use: No   • Sexual activity: Defer       Review of " "Systems  Review of Systems   Constitutional: Negative for activity change, appetite change, diaphoresis and fatigue.   HENT: Negative for facial swelling, sneezing, sore throat, tinnitus, trouble swallowing and voice change.    Eyes: Negative for photophobia, pain, discharge, redness, itching and visual disturbance.   Respiratory: Negative for apnea, cough, choking, chest tightness and shortness of breath.    Cardiovascular: Negative for chest pain, palpitations and leg swelling.   Gastrointestinal: Negative for abdominal distention, abdominal pain, constipation, diarrhea, nausea and vomiting.   Endocrine: Negative for cold intolerance, heat intolerance, polydipsia, polyphagia and polyuria.   Genitourinary: Negative for difficulty urinating, dysuria, frequency, hematuria and urgency.   Musculoskeletal: Negative for arthralgias, back pain, gait problem, joint swelling, myalgias, neck pain and neck stiffness.   Skin: Negative for color change, pallor, rash and wound.   Neurological: Negative for dizziness, tremors, weakness, light-headedness, numbness and headaches.   Hematological: Negative for adenopathy. Does not bruise/bleed easily.   Psychiatric/Behavioral: Negative for behavioral problems, confusion and sleep disturbance.        Objective    /78   Pulse 100   Ht 172.7 cm (68\")   Wt 115 kg (252 lb 12.8 oz)   SpO2 97%   BMI 38.44 kg/m²   Physical Exam   Constitutional: She is oriented to person, place, and time. She appears well-developed and well-nourished. No distress.   HENT:   Head: Normocephalic and atraumatic.   Right Ear: External ear normal.   Left Ear: External ear normal.   Nose: Nose normal.   Eyes: Pupils are equal, round, and reactive to light. Conjunctivae and EOM are normal.   Neck: Normal range of motion. Neck supple. No tracheal deviation present. No thyromegaly present.   Cardiovascular: Normal rate, regular rhythm and normal heart sounds.   No murmur heard.  Pulmonary/Chest: Effort " normal and breath sounds normal. No respiratory distress. She has no wheezes.   Abdominal: Soft. Bowel sounds are normal. There is no tenderness. There is no rebound and no guarding.   Musculoskeletal: Normal range of motion. She exhibits no edema, tenderness or deformity.   Neurological: She is alert and oriented to person, place, and time. No cranial nerve deficit.   Skin: Skin is warm and dry. No rash noted.   Psychiatric: She has a normal mood and affect. Her behavior is normal. Judgment and thought content normal.       Lab Review  Glucose (mg/dL)   Date Value   12/09/2019 121 (H)   09/24/2019 69   09/06/2019 227 (H)     Glucose, Arterial (mmol/L)   Date Value   10/14/2017 155     Sodium (mmol/L)   Date Value   12/09/2019 140   09/24/2019 145   09/06/2019 134 (L)     Potassium (mmol/L)   Date Value   12/09/2019 3.1 (L)   09/24/2019 3.8   09/06/2019 3.6     Chloride (mmol/L)   Date Value   12/09/2019 99   09/24/2019 99   09/06/2019 93 (L)     CO2 (mmol/L)   Date Value   12/09/2019 24.3   09/24/2019 29.8 (H)   09/06/2019 25.1     BUN (mg/dL)   Date Value   12/09/2019 9   09/24/2019 13   09/06/2019 17     Creatinine (mg/dL)   Date Value   12/09/2019 1.00   09/24/2019 1.11 (H)   09/06/2019 1.12 (H)     Hemoglobin A1C (%)   Date Value   12/09/2019 7.79 (H)   09/06/2019 8.49 (H)   07/12/2019 8.80 (H)     Triglycerides (mg/dL)   Date Value   12/09/2019 180 (H)   07/13/2019 214 (H)   06/07/2019 219 (H)     LDL Cholesterol  (mg/dL)   Date Value   12/09/2019 125 (H)   07/13/2019 92   06/07/2019 98       Assessment/Plan      1. 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, with long-term current use of insulin (CMS/[facility])    2. Mixed hyperlipidemia    3. Vitamin D deficiency    4. Essential hypertension    .    Medications prescribed:  Outpatient Encounter Medications as of 12/11/2019   Medication Sig Dispense Refill   • ARIPiprazole (ABILIFY) 15 MG tablet Take 1 tablet by mouth Daily. 90 tablet 1   •  aspirin 81 MG chewable tablet Chew 81 mg daily.     • atorvastatin (LIPITOR) 40 MG tablet Take 40 mg by mouth Every Night.     • BD SHARPS CONTAINER HOME misc 1 each Take As Directed. 1 each 0   • Blood Glucose Monitoring Suppl (ONE TOUCH ULTRA MINI) w/Device kit USE AS DIRECTED TO CHECK BLOOD SUGAR 1 each 0   • Calcium Citrate-Vitamin D (CITRACAL/VITAMIN D) 250-200 MG-UNIT tablet Take 2 tablets by mouth 2 (two) times a day.     • clopidogrel (PLAVIX) 75 MG tablet Take 75 mg by mouth Daily.     • cyanocobalamin 1000 MCG/ML injection INJECT 1 ML INTO THE APPROPRIATE MUSCLE AS DIRECTED BY PRESCRIBER EVERY 28 (TWENTY-EIGHT) DAYS. 1 mL 0   • FLUZONE QUADRIVALENT 0.5 ML suspension prefilled syringe injection      • furosemide (LASIX) 40 MG tablet Take 40 mg by mouth Daily.  3   • gabapentin (NEURONTIN) 400 MG capsule Take 1 capsule by mouth 3 (Three) Times a Day. 90 capsule 2   • GLUCAGON EMERGENCY 1 MG injection USE AS DIRECTED AS NEEDED 1 kit 0   • glucose blood (ONE TOUCH ULTRA TEST) test strip 1 each by Other route 4 (Four) Times a Day. Use as instructed 400 each 1   • hydroCHLOROthiazide (HYDRODIURIL) 12.5 MG tablet Take 12.5 mg by mouth Daily.     • HYDROcodone-acetaminophen (NORCO) 7.5-325 MG per tablet Take 1 tablet by mouth Every 4 (Four) Hours As Needed for Moderate Pain  or Severe Pain . 180 tablet 0   • insulin aspart (novoLOG FLEXPEN) 100 UNIT/ML solution pen-injector sc pen Inject 60 Units under the skin into the appropriate area as directed 3 (Three) Times a Day With Meals.     • Insulin Glargine (BASAGLAR KWIKPEN) 100 UNIT/ML injection pen Inject 100 Units under the skin into the appropriate area as directed Every Night. 6 pen 11   • Insulin Pen Needle (B-D ULTRAFINE III SHORT PEN) 31G X 8 MM misc USE TO INJECT 4 TIMES A  each 11   • LORazepam (ATIVAN) 0.5 MG tablet Take 1 tablet by mouth Daily As Needed for Anxiety. 30 tablet 0   • meclizine (ANTIVERT) 25 MG tablet Take 1 tablet by mouth 3 (Three)  "Times a Day As Needed for dizziness. 90 tablet 6   • metoclopramide (REGLAN) 10 MG tablet Take 10 mg by mouth 4 (Four) Times a Day As Needed.     • metoprolol succinate XL (TOPROL-XL) 25 MG 24 hr tablet TAKE 1 TABLET BY MOUTH DAILY. 31 tablet 6   • mirtazapine (REMERON) 45 MG tablet TAKE 1 TABLET BY MOUTH EVERY NIGHT. 90 tablet 0   • ONE TOUCH ULTRA TEST test strip USE TO TEST SUGAR 4 TIMES A  each 3   • RABEprazole (ACIPHEX) 20 MG EC tablet Take 1 tablet by mouth Daily. For acid reflux 30 tablet 5   • Syringe/Needle, Disp, (LUER LOCK SAFETY SYRINGES) 25G X 5/8\" 3 ML misc 1 each Daily. 100 each 0   • venlafaxine (EFFEXOR) 100 MG tablet Take 1 tablet by mouth 2 (Two) Times a Day. 60 tablet 3   • vitamin D (ERGOCALCIFEROL) 63596 units capsule capsule Take 50,000 Units by mouth 1 (One) Time Per Week. sunday     • [DISCONTINUED] Insulin Glargine (BASAGLAR KWIKPEN) 100 UNIT/ML injection pen Inject 100 Units under the skin into the appropriate area as directed Every Night. 5 pen 5     No facility-administered encounter medications on file as of 12/11/2019.        Orders placed during this encounter include:  Orders Placed This Encounter   Procedures   • Comprehensive Metabolic Panel   • Hemoglobin A1c   • Lipid Panel   • TSH   • Vitamin D 25 Hydroxy   • CBC & Differential     Order Specific Question:   Manual Differential     Answer:   No     Glycemic Management              Lab Results   Component Value Date    HGBA1C 7.79 (H) 12/09/2019                      Dexcom sensor ---she forgot to bring sensor             Basaglar taking 100 units         ==================        Novolog      Taking 60 units --      I asked her to watch her mealtime sugars if she is staying above 180 after meals then give 64 instead of 60      Discussed carb counting but states cannot     She will need to look at her meals because 30 units may not be enough for some meals and for others meals to strong     If you eat a meal and give 30 " units and your sugar is 200 or higher before the next meal look back at that meal and the next time you eat it either give more insulin or eat less     Also if you have a low after the meal give less insulin the next time you eat that meal         ==================     Jardiance 10 mg tablet , take before breakfast---will stop due to dizziness for possible volume depletion-----the dizziness has resolved since stopping jardiance        ==================     Metformin 500 mg tablets - caused diarrhea --stopped      ====================     start bydureon - stopped        Victoza stopped due to side effects      Warned of the gastric side effects she does have gastroparesis but she wants to try      Gave a sample she will let me know if she wants an RX called in      If vomiting or abdominal pain stop        januvia 100 mg daily  -stopped      ------------------------------     Lipid Management        on Lipitor   on fenofibrate                 LDL needs to be 70 or less     add zetia     Also discussed restarting the jardiance for the heart benefits but refuses       Component      Latest Ref Rng & Units 12/9/2019   Total Cholesterol      0 - 200 mg/dL 203 (H)   Triglycerides      0 - 150 mg/dL 180 (H)   HDL Cholesterol      40 - 60 mg/dL 42   LDL Cholesterol      0 - 100 mg/dL 125 (H)   VLDL Cholesterol      5 - 40 mg/dL 36   LDL/HDL Ratio       2.98            Blood Pressure Management: Control of blood pressure  on ACEi     stopped the lasix         Microvascular Complication Monitoring: Neuropathy type sensorial     Neurontin 400 mg po TID -- moderate relief but not complete        Component      Latest Ref Rng & Units 12/9/2019 12/9/2019           9:24 AM  9:24 AM   Protein/Creatinine Ratio      0.0 - 200.0 mg/G Crea 166.2    Creatinine, Urine      mg/dL 204.6 204.6   Total Protein, Urine      mg/dL 34.0    Microalbumin/Creatinine Ratio      mg/g  51.3   Microalbumin, Urine      mg/dL  10.5        intermittetly  takes reglan for gastroparesis     states eye exam ws 2018  RX for shoes - diabetic neuropathy      Immunization - last influenza vaccine Oct. 2018        Other Diabetes Related Aspects     TSH abnormal from July to Sept 2014     TSH in the 5 to 7 range                Lab Results   Component Value Date    TSH 3.580 12/09/2019           ---     Continue vitamin d supplement      April 2018     Vitamin d -28         Component      Latest Ref Rng & Units 9/6/2019   25 Hydroxy, Vitamin D      30.0 - 100.0 ng/ml 24.0 (L)               Referral to GI      Reason - diarrhea chronic      History of gastroparesis- -takes reglan         elevated platelets and wbc - refer to hematology--following with hematology            4. Follow-up: Return in about 3 months (around 3/11/2020) for Recheck.

## 2019-12-12 DIAGNOSIS — R94.4 ABNORMAL RENAL FUNCTION TEST: ICD-10-CM

## 2019-12-12 DIAGNOSIS — E87.6 HYPOKALEMIA: Primary | ICD-10-CM

## 2019-12-12 RX ORDER — POTASSIUM CHLORIDE 750 MG/1
TABLET, EXTENDED RELEASE ORAL
Qty: 60 TABLET | Refills: 2 | Status: ON HOLD | OUTPATIENT
Start: 2019-12-12 | End: 2020-02-16

## 2019-12-16 ENCOUNTER — LAB (OUTPATIENT)
Dept: LAB | Facility: HOSPITAL | Age: 59
End: 2019-12-16

## 2019-12-16 DIAGNOSIS — R94.4 ABNORMAL RENAL FUNCTION TEST: ICD-10-CM

## 2019-12-16 DIAGNOSIS — E87.6 HYPOKALEMIA: ICD-10-CM

## 2019-12-16 LAB
ANION GAP SERPL CALCULATED.3IONS-SCNC: 12.6 MMOL/L (ref 5–15)
BUN BLD-MCNC: 9 MG/DL (ref 6–20)
BUN/CREAT SERPL: 9.7 (ref 7–25)
CALCIUM SPEC-SCNC: 9.5 MG/DL (ref 8.6–10.5)
CHLORIDE SERPL-SCNC: 97 MMOL/L (ref 98–107)
CO2 SERPL-SCNC: 25.4 MMOL/L (ref 22–29)
CREAT BLD-MCNC: 0.93 MG/DL (ref 0.57–1)
GFR SERPL CREATININE-BSD FRML MDRD: 62 ML/MIN/1.73
GLUCOSE BLD-MCNC: 181 MG/DL (ref 65–99)
POTASSIUM BLD-SCNC: 3.7 MMOL/L (ref 3.5–5.2)
SODIUM BLD-SCNC: 135 MMOL/L (ref 136–145)

## 2019-12-16 PROCEDURE — 36415 COLL VENOUS BLD VENIPUNCTURE: CPT

## 2019-12-16 PROCEDURE — 80048 BASIC METABOLIC PNL TOTAL CA: CPT

## 2019-12-18 ENCOUNTER — TELEPHONE (OUTPATIENT)
Dept: FAMILY MEDICINE CLINIC | Facility: CLINIC | Age: 59
End: 2019-12-18

## 2019-12-18 NOTE — TELEPHONE ENCOUNTER
----- Message from BEBE Rincon sent at 12/17/2019  9:09 PM CST -----  Advise pt of normal potassium and kidney function tests.

## 2019-12-19 RX ORDER — VENLAFAXINE HYDROCHLORIDE 150 MG/1
CAPSULE, EXTENDED RELEASE ORAL
Qty: 60 CAPSULE | Refills: 2 | Status: SHIPPED | OUTPATIENT
Start: 2019-12-19 | End: 2020-03-03

## 2019-12-26 ENCOUNTER — OFFICE VISIT (OUTPATIENT)
Dept: FAMILY MEDICINE CLINIC | Facility: CLINIC | Age: 59
End: 2019-12-26

## 2019-12-26 VITALS
TEMPERATURE: 97.1 F | HEART RATE: 77 BPM | HEIGHT: 68 IN | OXYGEN SATURATION: 98 % | DIASTOLIC BLOOD PRESSURE: 76 MMHG | RESPIRATION RATE: 16 BRPM | SYSTOLIC BLOOD PRESSURE: 122 MMHG | WEIGHT: 248 LBS | BODY MASS INDEX: 37.59 KG/M2

## 2019-12-26 DIAGNOSIS — H66.002 NON-RECURRENT ACUTE SUPPURATIVE OTITIS MEDIA OF LEFT EAR WITHOUT SPONTANEOUS RUPTURE OF TYMPANIC MEMBRANE: Primary | ICD-10-CM

## 2019-12-26 DIAGNOSIS — G89.29 CHRONIC RIGHT-SIDED LOW BACK PAIN WITH RIGHT-SIDED SCIATICA: Chronic | ICD-10-CM

## 2019-12-26 DIAGNOSIS — M54.41 CHRONIC RIGHT-SIDED LOW BACK PAIN WITH RIGHT-SIDED SCIATICA: Chronic | ICD-10-CM

## 2019-12-26 DIAGNOSIS — E11.42 DIABETIC POLYNEUROPATHY ASSOCIATED WITH TYPE 2 DIABETES MELLITUS (HCC): Chronic | ICD-10-CM

## 2019-12-26 DIAGNOSIS — E78.2 MIXED HYPERLIPIDEMIA: Chronic | ICD-10-CM

## 2019-12-26 DIAGNOSIS — L84 CORNS AND CALLOSITIES: Chronic | ICD-10-CM

## 2019-12-26 PROCEDURE — 99214 OFFICE O/P EST MOD 30 MIN: CPT | Performed by: NURSE PRACTITIONER

## 2019-12-26 RX ORDER — HYDROCODONE BITARTRATE AND ACETAMINOPHEN 7.5; 325 MG/1; MG/1
1 TABLET ORAL EVERY 4 HOURS PRN
Qty: 180 TABLET | Refills: 0 | Status: CANCELLED | OUTPATIENT
Start: 2019-12-26

## 2019-12-26 RX ORDER — AMOXICILLIN 500 MG/1
500 TABLET, FILM COATED ORAL 3 TIMES DAILY
Qty: 21 TABLET | Refills: 0 | Status: SHIPPED | OUTPATIENT
Start: 2019-12-26 | End: 2020-01-05

## 2019-12-26 RX ORDER — ERGOCALCIFEROL 1.25 MG/1
CAPSULE ORAL
Qty: 12 CAPSULE | Refills: 2 | Status: SHIPPED | OUTPATIENT
Start: 2019-12-26 | End: 2020-08-24

## 2019-12-26 RX ORDER — ATORVASTATIN CALCIUM 80 MG/1
80 TABLET, FILM COATED ORAL DAILY
Qty: 90 TABLET | Refills: 1 | Status: SHIPPED | OUTPATIENT
Start: 2019-12-26 | End: 2020-06-15

## 2019-12-26 RX ORDER — HYDROCODONE BITARTRATE AND ACETAMINOPHEN 7.5; 325 MG/1; MG/1
1 TABLET ORAL EVERY 4 HOURS PRN
Qty: 180 TABLET | Refills: 0 | Status: SHIPPED | OUTPATIENT
Start: 2019-12-26 | End: 2020-02-04 | Stop reason: SDUPTHER

## 2019-12-26 NOTE — PROGRESS NOTES
"Chief Complaint   Patient presents with   • Lab results   • Sore Throat   • daibetic foot exam     Subjective   Ariana Martinez is a 59 y.o. female who presents to the office for routine follow up of chronic illnesses and review of labs. She would also like to discuss a sore throat and diabetic foot exam.    The following portions of the patient's history were reviewed and updated as appropriate: allergies, current medications, past family history, past medical history, past social history, past surgical history and problem list.    History of Present Illness   UDS 09/24/2019- appropriate   Fasting Labs: reviewed in office today.     Iron deficiency requiring IV Ferahema infusions intermittently. Sees hematology for this, next appt with Dr Alonso on 1/13/2020. Labs indicated persistent deficiency.  A1C improved over previous, still not at goal, managed by endocrinology, last seen by [facility] Tafoya endocrinology 12/11/19.  Requests diabetic foot exam today and order for diabetic shoes. Previous order from miguel Tafoya APRCAIT issued, states Bogart refused it as not an MD. Explained I am also not an MD, and though I could order the shoes and have cosigned by my supervising physician, that this is not the case with her, as endocrinology must sign/ initiate the order for shoes and document a foot exam to send with the order, and have cosigned by her supervising physician. Referred to call Elvis Gamboa for this appointment after the first of the year.   Mixed hyperlipidemia not at goal, worsening. Refuses increasing atorvastatin, has been at same level for 2 years. Is NOT on any other cholesterol lowering med. She states is aware of increased risk of potentially fatal AMI or CVA if cholesterol not properly controlled. She has a CAD history. She states will \"try to watch her diet better\" instead.  Educational information issued.  New complaint left ear pain, sore throat, no other complaints of symtpoms. " Having some cough at home, none in office today. No recent antibiotic use.  Agreeable to 7 day course amoxicillin for identified left ear infection    Past Medical History:   Diagnosis Date   • Angina, class IV (CMS/[facility])    • Anxiety    • Anxiety and depression    • Benign paroxysmal positional vertigo    • Bladder disorder     has bladder stimulator   • Carpal tunnel syndrome    • Chronic pain    • Coronary atherosclerosis     hx CABG 2005.  is followed by Dr Kwon   • Depression    • Diabetes mellitus (CMS/[facility])     Type 2, controlled   • Diabetic polyneuropathy (CMS/[facility])    • Elevated cholesterol    • Female stress incontinence    • Foot pain, left    • Full dentures    • Gastroparesis    • GERD (gastroesophageal reflux disease)    • Hyperlipidemia    • Hypertension    • Low back pain    • Malaise and fatigue    • Multiple joint pain    • Obesity     Refuses to be weighed   • Otalgia     Both   • Perforation of tympanic membrane     Left   • Postoperative wound infection    • Vitamin D deficiency    • Wears glasses     reading          Family History   Problem Relation Age of Onset   • Diabetes Other    • Heart disease Other    • Hypertension Other    • Heart disease Mother    • Stroke Mother    • Hypertension Mother    • Diabetes Sister    • Heart disease Sister    • Hypertension Sister    • Heart disease Sister    • Diabetes Sister    • Hypertension Sister    • Diabetes Sister    • Diabetes Sister    • Diabetes Sister    • Diabetes Sister         Review of Systems   Constitutional: Negative.  Negative for appetite change, chills, fatigue, fever and unexpected weight change.   HENT: Negative.  Negative for congestion, ear pain, rhinorrhea and sore throat.    Eyes: Negative.  Negative for pain.   Respiratory: Negative.  Negative for cough, chest tightness and shortness of breath.    Cardiovascular: Negative.  Negative for chest pain and palpitations.   Gastrointestinal: Negative.  Negative for abdominal pain,  "constipation, diarrhea and nausea.   Endocrine: Negative.    Genitourinary: Negative.  Negative for dysuria.   Musculoskeletal: Positive for arthralgias and back pain. Negative for joint swelling and neck pain.   Skin: Negative.  Negative for color change, pallor, rash and wound.   Allergic/Immunologic: Negative.    Neurological: Positive for numbness (BLE, chronic polyneuropathy includes feet.). Negative for dizziness and headaches.   Hematological: Negative.    Psychiatric/Behavioral: Negative for sleep disturbance and suicidal ideas. The patient is nervous/anxious.    All other systems reviewed and are negative.      Objective   Vitals:    12/26/19 1034   BP: 122/76   BP Location: Left arm   Patient Position: Sitting   Cuff Size: Adult   Pulse: 77   Resp: 16   Temp: 97.1 °F (36.2 °C)   TempSrc: Tympanic   SpO2: 98%   Weight: 112 kg (248 lb)   Height: 172.7 cm (68\")     Physical Exam   Constitutional: She is oriented to person, place, and time. She appears well-developed and well-nourished.   HENT:   Head: Normocephalic and atraumatic.   Eyes: Pupils are equal, round, and reactive to light.   Neck: Normal range of motion. Neck supple.   Cardiovascular: Normal rate, regular rhythm and normal heart sounds.   Pulmonary/Chest: Effort normal and breath sounds normal. No respiratory distress. She has no wheezes. She has no rales.   Abdominal: Soft. Bowel sounds are normal.   Musculoskeletal: She exhibits tenderness (left foot, right lower back). She exhibits no edema.        Back:     Ariana had a diabetic foot exam performed today.   During the foot exam she had a monofilament test performed.    Neurological Sensory Findings - Unaltered hot/cold right ankle/foot discrimination and unaltered hot/cold left ankle/foot discrimination. Altered sharp/dull right ankle/foot discrimination and altered sharp/dull left ankle/foot discrimination. No right ankle/foot altered proprioception and no left ankle/foot altered " proprioception  Vascular Status -  Her right foot exhibits normal foot vasculature  and no edema. Her left foot exhibits abnormal foot edema. Her left foot exhibits normal foot vasculature .  Skin Integrity  -  Her right foot skin is intact. She has callous right foot.  She has no right foot onychomycosis, no right foot ulcer, no ingrown toenail on right foot, right heel is not dry and cracked, no right foot warmth, no right foot blister and no right foot gangrenous changes.Her left foot skin is intact.She has left foot ulcer and callous left foot. She has no left foot onychomycosis, no left ingrown toenail, no left heel dry and cracked, no left foot warmth, no left foot blister and no left foot gangrenous changes..   Foot Structure and Biomechanics -  Her right foot has no hallux valgus, no pes cavus, no claw toes, no hammer toes and no cavovarus present. Her left foot has no hallux valgus, no pes cavus, no claw toes, no hammer toes and no cavovarus.  Neurological: She is alert and oriented to person, place, and time.   Skin: Skin is warm and dry. Capillary refill takes 2 to 3 seconds.   Psychiatric: She has a normal mood and affect.   Nursing note and vitals reviewed.      Assessment/Plan   Ariana was seen today for lab results, sore throat and daibetic foot exam.    Diagnoses and all orders for this visit:    Non-recurrent acute suppurative otitis media of left ear without spontaneous rupture of tympanic membrane  -     amoxicillin (AMOXIL) 500 MG tablet; Take 1 tablet by mouth 3 (Three) Times a Day for 10 days.    Mixed hyperlipidemia  Comments:  worsening, ordered increased atorvastatin to 80, pt refuses, will have to call and cancel order. wants to work on her diet. info given.   Orders:  -     atorvastatin (LIPITOR) 80 MG tablet; Take 1 tablet by mouth Daily.    Corns and callosities  Comments:  mild callusing bilateral heels lateral great toes    Diabetic polyneuropathy associated with type 2 diabetes  mellitus (CMS/HCC)  Comments:  loss of protective sensation bilat feet in at least 4/10 areas tested.    Chronic right-sided low back pain with right-sided sciatica  Comments:  controlled with Norco  Orders:  -     HYDROcodone-acetaminophen (NORCO) 7.5-325 MG per tablet; Take 1 tablet by mouth Every 4 (Four) Hours As Needed for Moderate Pain  or Severe Pain .    refills requested on hydrocodone today, last seen for chronic pain on 12/3/19, LESTER and ROMY current. Refill sent.       PHQ-2/PHQ-9 Depression Screening 12/3/2019   Little interest or pleasure in doing things 0   Feeling down, depressed, or hopeless 0   Trouble falling or staying asleep, or sleeping too much 0   Feeling tired or having little energy 0   Poor appetite or overeating 0   Feeling bad about yourself - or that you are a failure or have let yourself or your family down 0   Trouble concentrating on things, such as reading the newspaper or watching television 0   Moving or speaking so slowly that other people could have noticed. Or the opposite - being so fidgety or restless that you have been moving around a lot more than usual 0   Thoughts that you would be better off dead, or of hurting yourself in some way 0   Total Score 0   If you checked off any problems, how difficult have these problems made it for you to do your work, take care of things at home, or get along with other people? -   Patient understands the risks associated with this controlled medication, including tolerance and addiction.  Patient also agrees to only obtain this medication from me, and not from a another provider, unless that provider is covering for me in my absence.  Patient also agrees to be compliant in dosing, and not self adjust the dose of medication.  A signed controlled substance agreement is on file, and the patient has received a controlled substance education sheet at this a previous visit.  The patient has also signed a consent for treatment with a controlled  substance as per [facility] policy. LESTER was obtained.      BEBE Palomino         Return in about 3 months (around 3/26/2020).    Patient Instructions   Fat and Cholesterol Restricted Eating Plan  Eating a diet that limits fat and cholesterol may help lower your risk for heart disease and other conditions. Your body needs fat and cholesterol for basic functions, but eating too much of these things can be harmful to your health.  Your health care provider may order lab tests to check your blood fat (lipid) and cholesterol levels. This helps your health care provider understand your risk for certain conditions and whether you need to make diet changes. Work with your health care provider or dietitian to make an eating plan that is right for you.  Your plan includes:  · Limit your fat intake to ______% or less of your total calories a day.  · Limit your saturated fat intake to ______% or less of your total calories a day.  · Limit the amount of cholesterol in your diet to less than _________mg a day.  · Eat ___________ g of fiber a day.  What are tips for following this plan?  General guidelines    · If you are overweight, work with your health care provider to lose weight safely. Losing just 5-10% of your body weight can improve your overall health and help prevent diseases such as diabetes and heart disease.  · Avoid:  ? Foods with added sugar.  ? Fried foods.  ? Foods that contain partially hydrogenated oils, including stick margarine, some tub margarines, cookies, crackers, and other baked goods.  · Limit alcohol intake to no more than 1 drink a day for nonpregnant women and 2 drinks a day for men. One drink equals 12 oz of beer, 5 oz of wine, or 1½ oz of hard liquor.  Reading food labels  · Check food labels for:  ? Trans fats, partially hydrogenated oils, or high amounts of saturated fat. Avoid foods that contain saturated fat and trans fat.  ? The amount of cholesterol in each serving. Try to eat no  "more than 200 mg of cholesterol each day.  ? The amount of fiber in each serving. Try to eat at least 20-30 g of fiber each day.  · Choose foods with healthy fats, such as:  ? Monounsaturated and polyunsaturated fats. These include olive and canola oil, flaxseeds, walnuts, almonds, and seeds.  ? Omega-3 fats. These are found in foods such as salmon, mackerel, sardines, tuna, flaxseed oil, and ground flaxseeds.  · Choose grain products that have whole grains. Look for the word \"whole\" as the first word in the ingredient list.  Cooking  · Cook foods using methods other than frying. Baking, boiling, grilling, and broiling are some healthy options.  · Eat more home-cooked food and less restaurant, buffet, and fast food.  · Avoid cooking using saturated fats.  ? Animal sources of saturated fats include meats, butter, and cream.  ? Plant sources of saturated fats include palm oil, palm kernel oil, and coconut oil.  Meal planning    · At meals, imagine dividing your plate into fourths:  ? Fill one-half of your plate with vegetables and green salads.  ? Fill one-fourth of your plate with whole grains.  ? Fill one-fourth of your plate with lean protein foods.  · Eat fish that is high in omega-3 fats at least two times a week.  · Eat more foods that contain fiber, such as whole grains, beans, apples, broccoli, carrots, peas, and barley. These foods help promote healthy cholesterol levels in the blood.  Recommended foods  Grains  · Whole grains, such as whole wheat or whole grain breads, crackers, cereals, and pasta. Unsweetened oatmeal, bulgur, barley, quinoa, or brown rice. Corn or whole wheat flour tortillas.  Vegetables  · Fresh or frozen vegetables (raw, steamed, roasted, or grilled). Green salads.  Fruits  · All fresh, canned (in natural juice), or frozen fruits.  Meats and other protein foods  · Ground beef (85% or leaner), grass-fed beef, or beef trimmed of fat. Skinless chicken or turkey. Ground chicken or turkey. " Pork trimmed of fat. All fish and seafood. Egg whites. Dried beans, peas, or lentils. Unsalted nuts or seeds. Unsalted canned beans. Natural nut butters without added sugar and oil.  Dairy  · Low-fat or nonfat dairy products, such as skim or 1% milk, 2% or reduced-fat cheeses, low-fat and fat-free ricotta or cottage cheese, or plain low-fat and nonfat yogurt.  Fats and oils  · Tub margarine without trans fats. Light or reduced-fat mayonnaise and salad dressings. Avocado. Olive, canola, sesame, or safflower oils.  The items listed above may not be a complete list of recommended foods or beverages. Contact your dietitian for more options.  Foods to avoid  Grains  · White bread. White pasta. White rice. Cornbread. Bagels, pastries, and croissants. Crackers and snack foods that contain trans fat and hydrogenated oils.  Vegetables  · Vegetables cooked in cheese, cream, or butter sauce. Fried vegetables.  Fruits  · Canned fruit in heavy syrup. Fruit in cream or butter sauce. Fried fruit.  Meats and other protein foods  · Fatty cuts of meat. Ribs, chicken wings, connelly, sausage, bologna, salami, chitterlings, fatback, hot dogs, bratwurst, and packaged lunch meats. Liver and organ meats. Whole eggs and egg yolks. Chicken and turkey with skin. Fried meat.  Dairy  · Whole or 2% milk, cream, half-and-half, and cream cheese. Whole milk cheeses. Whole-fat or sweetened yogurt. Full-fat cheeses. Nondairy creamers and whipped toppings. Processed cheese, cheese spreads, and cheese curds.  Beverages  · Alcohol. Sugar-sweetened drinks such as sodas, lemonade, and fruit drinks.  Fats and oils  · Butter, stick margarine, lard, shortening, ghee, or connelly fat. Coconut, palm kernel, and palm oils.  Sweets and desserts  · Corn syrup, sugars, honey, and molasses. Candy. Jam and jelly. Syrup. Sweetened cereals. Cookies, pies, cakes, donuts, muffins, and ice cream.  The items listed above may not be a complete list of foods and beverages to  avoid. Contact your dietitian for more information.  Summary  · Your body needs fat and cholesterol for basic functions. However, eating too much of these things can be harmful to your health.  · Work with your health care provider and dietitian to follow a diet low in fat and cholesterol. Doing this may help lower your risk for heart disease and other conditions.  · Choose healthy fats, such as monounsaturated and polyunsaturated fats, and foods high in omega-3 fatty acids.  · Eat fiber-rich foods, such as whole grains, beans, peas, fruits, and vegetables.  · Limit or avoid alcohol, fried foods, and foods high in saturated fats, partially hydrogenated oils, and sugar.  This information is not intended to replace advice given to you by your health care provider. Make sure you discuss any questions you have with your health care provider.  Document Released: 12/18/2006 Document Revised: 09/04/2018 Document Reviewed: 09/04/2018  [employer] Interactive Patient Education © 2019 [employer]    Type 2 Diabetes Mellitus, Self Care, Adult  Caring for yourself after you have been diagnosed with type 2 diabetes (type 2 diabetes mellitus) means keeping your blood sugar (glucose) under control with a balance of:  · Nutrition.  · Exercise.  · Lifestyle changes.  · Medicines or insulin, if necessary.  · Support from your team of health care providers and others.  The following information explains what you need to know to manage your diabetes at home.  What are the risks?  Having diabetes can put you at risk for other long-term (chronic) conditions, such as heart disease and kidney disease. Your health care provider may prescribe medicines to help prevent complications from diabetes. These medicines may include:  · Aspirin.  · Medicine to lower cholesterol.  · Medicine to control blood pressure.  How to monitor blood glucose    · Check your blood glucose every day, as often as told by your health care provider.  · Have your A1c  (hemoglobin A1c) level checked two or more times a year, or as often as told by your health care provider.  Your health care provider will set individualized treatment goals for you. Generally, the goal of treatment is to maintain the following blood glucose levels:  · Before meals (preprandial):  mg/dL (4.4-7.2 mmol/L).  · After meals (postprandial): below 180 mg/dL (10 mmol/L).  · A1c level: less than 7%.  How to manage hyperglycemia and hypoglycemia  Hyperglycemia symptoms  Hyperglycemia, also called high blood glucose, occurs when blood glucose is too high. Make sure you know the early signs of hyperglycemia, such as:  · Increased thirst.  · Hunger.  · Feeling very tired.  · Needing to urinate more often than usual.  · Blurry vision.  Hypoglycemia symptoms  Hypoglycemia, also called low blood glucose, occurs with a blood glucose level at or below 70 mg/dL (3.9 mmol/L). The risk for hypoglycemia increases during or after exercise, during sleep, during illness, and when skipping meals or not eating for a long time (fasting).  It is important to know the symptoms of hypoglycemia and treat it right away. Always have a 15-gram rapid-acting carbohydrate snack with you to treat low blood glucose. Family members and close friends should also know the symptoms and should understand how to treat hypoglycemia, in case you are not able to treat yourself. Symptoms may include:  · Hunger.  · Anxiety.  · Sweating and feeling clammy.  · Confusion.  · Dizziness or feeling light-headed.  · Sleepiness.  · Nausea.  · Increased heart rate.  · Headache.  · Blurry vision.  · Irritability.  · A change in coordination.  · Tingling or numbness around the mouth, lips, or tongue.  · Restless sleep.  · Fainting.  · Seizure.  Treating hypoglycemia  If you are alert and able to swallow safely, follow the 15:15 rule:  · Take 15 grams of a rapid-acting carbohydrate. Talk with your health care provider about how much you should  take.  · Rapid-acting options include:  ? Glucose pills (take 15 grams).  ? 6-8 pieces of hard candy.  ? 4-6 oz (120-150 mL) of fruit juice.  ? 4-6 oz (120-150 mL) of regular (not diet) soda.  ? 1 Tbsp (15 mL) honey or sugar.  · Check your blood glucose 15 minutes after you take the carbohydrate.  · If the repeat blood glucose level is still at or below 70 mg/dL (3.9 mmol/L), take 15 grams of a carbohydrate again.  · If your blood glucose level does not increase above 70 mg/dL (3.9 mmol/L) after 3 tries, seek emergency medical care.  · After your blood glucose level returns to normal, eat a meal or a snack within 1 hour.  Treating severe hypoglycemia  Severe hypoglycemia is when your blood glucose level is at or below 54 mg/dL (3 mmol/L). Severe hypoglycemia is an emergency. Do not wait to see if the symptoms will go away. Get medical help right away. Call your local emergency services (911 in the [place]  If you have severe hypoglycemia and you cannot eat or drink, you may need an injection of glucagon. A family member or close friend should learn how to check your blood glucose and how to give you a glucagon injection. Ask your health care provider if you need to have an emergency glucagon injection kit available.  Severe hypoglycemia may need to be treated in a hospital. The treatment may include getting glucose through an IV. You may also need treatment for the cause of your hypoglycemia.  Follow these instructions at home:  Take diabetes medicines as told  · If your health care provider prescribed insulin or diabetes medicines, take them every day.  · Do not run out of insulin or other diabetes medicines that you take. Plan ahead so you always have these available.  · If you use insulin, adjust your dosage based on how physically active you are and what foods you eat. Your health care provider will tell you how to adjust your dosage.  Make healthy food choices    The things that you eat and drink affect your  blood glucose and your insulin dosage. Making good choices helps to control your diabetes and prevent other health problems. A healthy meal plan includes eating lean proteins, complex carbohydrates, fresh fruits and vegetables, low-fat dairy products, and healthy fats.  Make an appointment to see a diet and nutrition specialist (registered dietitian) to help you create an eating plan that is right for you. Make sure that you:  · Follow instructions from your health care provider about eating or drinking restrictions.  · Drink enough fluid to keep your urine pale yellow.  · Keep a record of the carbohydrates that you eat. Do this by reading food labels and learning the standard serving sizes of foods.  · Follow your sick day plan whenever you cannot eat or drink as usual. Make this plan in advance with your health care provider.    Stay active  Exercise regularly, as told by your health care provider. This may include:  · Stretching and doing strength exercises, such as yoga or weightlifting, 2 or more times a week.  · Doing 150 minutes or more of moderate-intensity or vigorous-intensity exercise each week. This could be brisk walking, biking, or water aerobics.  ? Spread out your activity over 3 or more days of the week.  ? Do not go more than 2 days in a row without doing some kind of physical activity.  When you start a new exercise or activity, work with your health care provider to adjust your insulin, medicines, or food intake as needed.  Make healthy lifestyle choices  · Do not use any tobacco products, such as cigarettes, chewing tobacco, and e-cigarettes. If you need help quitting, ask your health care provider.  · If your health care provider says that alcohol is safe for you, limit alcohol intake to no more than 1 drink per day for nonpregnant women and 2 drinks per day for men. One drink equals 12 oz of beer (355 mL), 5 oz of wine (148 mL), or 1½ oz of hard liquor (44 mL).  · Learn to manage stress. If  you need help with this, ask your health care provider.  Care for your body    · Keep your immunizations up to date. In addition to getting vaccinations as told by your health care provider, it is recommended that you get vaccinated against the following illnesses:  ? The flu (influenza). Get a flu shot every year.  ? Pneumonia.  ? Hepatitis B.  · Schedule an eye exam soon after your diagnosis, and then one time every year after that.  · Check your skin and feet every day for cuts, bruises, redness, blisters, or sores. Schedule a foot exam with your health care provider once every year.  · Brush your teeth and gums two times a day, and floss one or more times a day. Visit your dentist one or more times every 6 months.  · Maintain a healthy weight.  General instructions  · Take over-the-counter and prescription medicines only as told by your health care provider.  · Share your diabetes management plan with people in your workplace, school, and household.  · Carry a medical alert card or wear medical alert jewelry.  · Keep all follow-up visits as told by your health care provider. This is important.  Questions to ask your health care provider  · Do I need to meet with a diabetes educator?  · Where can I find a support group for people with diabetes?  Where to find more information  For more information about diabetes, visit:  · American Diabetes Association (ADA): www.diabetes.org  · American Association of Diabetes Educators (AADE): www.diabeteseducator.org  Summary  · Caring for yourself after you have been diagnosed with (type 2 diabetes mellitus) means keeping your blood sugar (glucose) under control with a balance of nutrition, exercise, lifestyle changes, and medicine.  · Check your blood glucose every day, as often as told by your health care provider.  · Having diabetes can put you at risk for other long-term (chronic) conditions, such as heart disease and kidney disease. Your health care provider may  prescribe medicines to help prevent complications from diabetes.  · Keep all follow-up visits as told by your health care provider. This is important.  This information is not intended to replace advice given to you by your health care provider. Make sure you discuss any questions you have with your health care provider.  Document Released: 04/10/2017 Document Revised: 06/10/2019 Document Reviewed: 01/20/2017  [employer] Interactive Patient Education © 2019 [employer]    Diabetes Mellitus and Foot Care  Foot care is an important part of your health, especially when you have diabetes. Diabetes may cause you to have problems because of poor blood flow (circulation) to your feet and legs, which can cause your skin to:  · Become thinner and drier.  · Break more easily.  · Heal more slowly.  · Peel and crack.  You may also have nerve damage (neuropathy) in your legs and feet, causing decreased feeling in them. This means that you may not notice minor injuries to your feet that could lead to more serious problems. Noticing and addressing any potential problems early is the best way to prevent future foot problems.  How to care for your feet  Foot hygiene  · Wash your feet daily with warm water and mild soap. Do not use hot water. Then, pat your feet and the areas between your toes until they are completely dry. Do not soak your feet as this can dry your skin.  · Trim your toenails straight across. Do not dig under them or around the cuticle. File the edges of your nails with an emery board or nail file.  · Apply a moisturizing lotion or petroleum jelly to the skin on your feet and to dry, brittle toenails. Use lotion that does not contain alcohol and is unscented. Do not apply lotion between your toes.  Shoes and socks  · Wear clean socks or stockings every day. Make sure they are not too tight. Do not wear knee-high stockings since they may decrease blood flow to your legs.  · Wear shoes that fit properly and have  enough cushioning. Always look in your shoes before you put them on to be sure there are no objects inside.  · To break in new shoes, wear them for just a few hours a day. This prevents injuries on your feet.  Wounds, scrapes, corns, and calluses  · Check your feet daily for blisters, cuts, bruises, sores, and redness. If you cannot see the bottom of your feet, use a mirror or ask someone for help.  · Do not cut corns or calluses or try to remove them with medicine.  · If you find a minor scrape, cut, or break in the skin on your feet, keep it and the skin around it clean and dry. You may clean these areas with mild soap and water. Do not clean the area with peroxide, alcohol, or iodine.  · If you have a wound, scrape, corn, or callus on your foot, look at it several times a day to make sure it is healing and not infected. Check for:  ? Redness, swelling, or pain.  ? Fluid or blood.  ? Warmth.  ? Pus or a bad smell.  General instructions  · Do not cross your legs. This may decrease blood flow to your feet.  · Do not use heating pads or hot water bottles on your feet. They may burn your skin. If you have lost feeling in your feet or legs, you may not know this is happening until it is too late.  · Protect your feet from hot and cold by wearing shoes, such as at the beach or on hot pavement.  · Schedule a complete foot exam at least once a year (annually) or more often if you have foot problems. If you have foot problems, report any cuts, sores, or bruises to your health care provider immediately.  Contact a health care provider if:  · You have a medical condition that increases your risk of infection and you have any cuts, sores, or bruises on your feet.  · You have an injury that is not healing.  · You have redness on your legs or feet.  · You feel burning or tingling in your legs or feet.  · You have pain or cramps in your legs and feet.  · Your legs or feet are numb.  · Your feet always feel cold.  · You have pain  around a toenail.  Get help right away if:  · You have a wound, scrape, corn, or callus on your foot and:  ? You have pain, swelling, or redness that gets worse.  ? You have fluid or blood coming from the wound, scrape, corn, or callus.  ? Your wound, scrape, corn, or callus feels warm to the touch.  ? You have pus or a bad smell coming from the wound, scrape, corn, or callus.  ? You have a fever.  ? You have a red line going up your leg.  Summary  · Check your feet every day for cuts, sores, red spots, swelling, and blisters.  · Moisturize feet and legs daily.  · Wear shoes that fit properly and have enough cushioning.  · If you have foot problems, report any cuts, sores, or bruises to your health care provider immediately.  · Schedule a complete foot exam at least once a year (annually) or more often if you have foot problems.  This information is not intended to replace advice given to you by your health care provider. Make sure you discuss any questions you have with your health care provider.  Document Released: 12/15/2001 Document Revised: 01/30/2019 Document Reviewed: 01/19/2018  [employer] Interactive Patient Education © 2019 [employer]    Carbohydrate Counting for Diabetes Mellitus, Adult    Carbohydrate counting is a method of keeping track of how many carbohydrates you eat. Eating carbohydrates naturally increases the amount of sugar (glucose) in the blood. Counting how many carbohydrates you eat helps keep your blood glucose within normal limits, which helps you manage your diabetes (diabetes mellitus).  It is important to know how many carbohydrates you can safely have in each meal. This is different for every person. A diet and nutrition specialist (registered dietitian) can help you make a meal plan and calculate how many carbohydrates you should have at each meal and snack.  Carbohydrates are found in the following foods:  · Grains, such as breads and cereals.  · Dried beans and soy  "products.  · Starchy vegetables, such as potatoes, peas, and corn.  · Fruit and fruit juices.  · Milk and yogurt.  · Sweets and snack foods, such as cake, cookies, candy, chips, and soft drinks.  How do I count carbohydrates?  There are two ways to count carbohydrates in food. You can use either of the methods or a combination of both.  Reading \"Nutrition Facts\" on packaged food  The \"Nutrition Facts\" list is included on the labels of almost all packaged foods and beverages in the [place] It includes:  · The serving size.  · Information about nutrients in each serving, including the grams (g) of carbohydrate per serving.  To use the “Nutrition Facts\":  · Decide how many servings you will have.  · Multiply the number of servings by the number of carbohydrates per serving.  · The resulting number is the total amount of carbohydrates that you will be having.  Learning standard serving sizes of other foods  When you eat carbohydrate foods that are not packaged or do not include \"Nutrition Facts\" on the label, you need to measure the servings in order to count the amount of carbohydrates:  · Measure the foods that you will eat with a food scale or measuring cup, if needed.  · Decide how many standard-size servings you will eat.  · Multiply the number of servings by 15. Most carbohydrate-rich foods have about 15 g of carbohydrates per serving.  ? For example, if you eat 8 oz (170 g) of strawberries, you will have eaten 2 servings and 30 g of carbohydrates (2 servings x 15 g = 30 g).  · For foods that have more than one food mixed, such as soups and casseroles, you must count the carbohydrates in each food that is included.  The following list contains standard serving sizes of common carbohydrate-rich foods. Each of these servings has about 15 g of carbohydrates:  · ½ hamburger bun or ½ English muffin.  · ½ oz (15 mL) syrup.  · ½ oz (14 g) jelly.  · 1 slice of bread.  · 1 six-inch tortilla.  · 3 oz (85 g) cooked rice or " pasta.  · 4 oz (113 g) cooked dried beans.  · 4 oz (113 g) starchy vegetable, such as peas, corn, or potatoes.  · 4 oz (113 g) hot cereal.  · 4 oz (113 g) mashed potatoes or ¼ of a large baked potato.  · 4 oz (113 g) canned or frozen fruit.  · 4 oz (120 mL) fruit juice.  · 4-6 crackers.  · 6 chicken nuggets.  · 6 oz (170 g) unsweetened dry cereal.  · 6 oz (170 g) plain fat-free yogurt or yogurt sweetened with artificial sweeteners.  · 8 oz (240 mL) milk.  · 8 oz (170 g) fresh fruit or one small piece of fruit.  · 24 oz (680 g) popped popcorn.  Example of carbohydrate counting  Sample meal  · 3 oz (85 g) chicken breast.  · 6 oz (170 g) brown rice.  · 4 oz (113 g) corn.  · 8 oz (240 mL) milk.  · 8 oz (170 g) strawberries with sugar-free whipped topping.  Carbohydrate calculation  1. Identify the foods that contain carbohydrates:  ? Rice.  ? Corn.  ? Milk.  ? Strawberries.  2. Calculate how many servings you have of each food:  ? 2 servings rice.  ? 1 serving corn.  ? 1 serving milk.  ? 1 serving strawberries.  3. Multiply each number of servings by 15 g:  ? 2 servings rice x 15 g = 30 g.  ? 1 serving corn x 15 g = 15 g.  ? 1 serving milk x 15 g = 15 g.  ? 1 serving strawberries x 15 g = 15 g.  4. Add together all of the amounts to find the total grams of carbohydrates eaten:  ? 30 g + 15 g + 15 g + 15 g = 75 g of carbohydrates total.  Summary  · Carbohydrate counting is a method of keeping track of how many carbohydrates you eat.  · Eating carbohydrates naturally increases the amount of sugar (glucose) in the blood.  · Counting how many carbohydrates you eat helps keep your blood glucose within normal limits, which helps you manage your diabetes.  · A diet and nutrition specialist (registered dietitian) can help you make a meal plan and calculate how many carbohydrates you should have at each meal and snack.  This information is not intended to replace advice given to you by your health care provider. Make sure you  discuss any questions you have with your health care provider.  Document Released: 12/18/2006 Document Revised: 06/27/2018 Document Reviewed: 05/31/2017  [employer] Interactive Patient Education © 2019 [employer]

## 2019-12-26 NOTE — PATIENT INSTRUCTIONS
Fat and Cholesterol Restricted Eating Plan  Eating a diet that limits fat and cholesterol may help lower your risk for heart disease and other conditions. Your body needs fat and cholesterol for basic functions, but eating too much of these things can be harmful to your health.  Your health care provider may order lab tests to check your blood fat (lipid) and cholesterol levels. This helps your health care provider understand your risk for certain conditions and whether you need to make diet changes. Work with your health care provider or dietitian to make an eating plan that is right for you.  Your plan includes:  · Limit your fat intake to ______% or less of your total calories a day.  · Limit your saturated fat intake to ______% or less of your total calories a day.  · Limit the amount of cholesterol in your diet to less than _________mg a day.  · Eat ___________ g of fiber a day.  What are tips for following this plan?  General guidelines    · If you are overweight, work with your health care provider to lose weight safely. Losing just 5-10% of your body weight can improve your overall health and help prevent diseases such as diabetes and heart disease.  · Avoid:  ? Foods with added sugar.  ? Fried foods.  ? Foods that contain partially hydrogenated oils, including stick margarine, some tub margarines, cookies, crackers, and other baked goods.  · Limit alcohol intake to no more than 1 drink a day for nonpregnant women and 2 drinks a day for men. One drink equals 12 oz of beer, 5 oz of wine, or 1½ oz of hard liquor.  Reading food labels  · Check food labels for:  ? Trans fats, partially hydrogenated oils, or high amounts of saturated fat. Avoid foods that contain saturated fat and trans fat.  ? The amount of cholesterol in each serving. Try to eat no more than 200 mg of cholesterol each day.  ? The amount of fiber in each serving. Try to eat at least 20-30 g of fiber each day.  · Choose foods with healthy fats,  "such as:  ? Monounsaturated and polyunsaturated fats. These include olive and canola oil, flaxseeds, walnuts, almonds, and seeds.  ? Omega-3 fats. These are found in foods such as salmon, mackerel, sardines, tuna, flaxseed oil, and ground flaxseeds.  · Choose grain products that have whole grains. Look for the word \"whole\" as the first word in the ingredient list.  Cooking  · Cook foods using methods other than frying. Baking, boiling, grilling, and broiling are some healthy options.  · Eat more home-cooked food and less restaurant, buffet, and fast food.  · Avoid cooking using saturated fats.  ? Animal sources of saturated fats include meats, butter, and cream.  ? Plant sources of saturated fats include palm oil, palm kernel oil, and coconut oil.  Meal planning    · At meals, imagine dividing your plate into fourths:  ? Fill one-half of your plate with vegetables and green salads.  ? Fill one-fourth of your plate with whole grains.  ? Fill one-fourth of your plate with lean protein foods.  · Eat fish that is high in omega-3 fats at least two times a week.  · Eat more foods that contain fiber, such as whole grains, beans, apples, broccoli, carrots, peas, and barley. These foods help promote healthy cholesterol levels in the blood.  Recommended foods  Grains  · Whole grains, such as whole wheat or whole grain breads, crackers, cereals, and pasta. Unsweetened oatmeal, bulgur, barley, quinoa, or brown rice. Corn or whole wheat flour tortillas.  Vegetables  · Fresh or frozen vegetables (raw, steamed, roasted, or grilled). Green salads.  Fruits  · All fresh, canned (in natural juice), or frozen fruits.  Meats and other protein foods  · Ground beef (85% or leaner), grass-fed beef, or beef trimmed of fat. Skinless chicken or turkey. Ground chicken or turkey. Pork trimmed of fat. All fish and seafood. Egg whites. Dried beans, peas, or lentils. Unsalted nuts or seeds. Unsalted canned beans. Natural nut butters without added " sugar and oil.  Dairy  · Low-fat or nonfat dairy products, such as skim or 1% milk, 2% or reduced-fat cheeses, low-fat and fat-free ricotta or cottage cheese, or plain low-fat and nonfat yogurt.  Fats and oils  · Tub margarine without trans fats. Light or reduced-fat mayonnaise and salad dressings. Avocado. Olive, canola, sesame, or safflower oils.  The items listed above may not be a complete list of recommended foods or beverages. Contact your dietitian for more options.  Foods to avoid  Grains  · White bread. White pasta. White rice. Cornbread. Bagels, pastries, and croissants. Crackers and snack foods that contain trans fat and hydrogenated oils.  Vegetables  · Vegetables cooked in cheese, cream, or butter sauce. Fried vegetables.  Fruits  · Canned fruit in heavy syrup. Fruit in cream or butter sauce. Fried fruit.  Meats and other protein foods  · Fatty cuts of meat. Ribs, chicken wings, connelly, sausage, bologna, salami, chitterlings, fatback, hot dogs, bratwurst, and packaged lunch meats. Liver and organ meats. Whole eggs and egg yolks. Chicken and turkey with skin. Fried meat.  Dairy  · Whole or 2% milk, cream, half-and-half, and cream cheese. Whole milk cheeses. Whole-fat or sweetened yogurt. Full-fat cheeses. Nondairy creamers and whipped toppings. Processed cheese, cheese spreads, and cheese curds.  Beverages  · Alcohol. Sugar-sweetened drinks such as sodas, lemonade, and fruit drinks.  Fats and oils  · Butter, stick margarine, lard, shortening, ghee, or connelly fat. Coconut, palm kernel, and palm oils.  Sweets and desserts  · Corn syrup, sugars, honey, and molasses. Candy. Jam and jelly. Syrup. Sweetened cereals. Cookies, pies, cakes, donuts, muffins, and ice cream.  The items listed above may not be a complete list of foods and beverages to avoid. Contact your dietitian for more information.  Summary  · Your body needs fat and cholesterol for basic functions. However, eating too much of these things can be  harmful to your health.  · Work with your health care provider and dietitian to follow a diet low in fat and cholesterol. Doing this may help lower your risk for heart disease and other conditions.  · Choose healthy fats, such as monounsaturated and polyunsaturated fats, and foods high in omega-3 fatty acids.  · Eat fiber-rich foods, such as whole grains, beans, peas, fruits, and vegetables.  · Limit or avoid alcohol, fried foods, and foods high in saturated fats, partially hydrogenated oils, and sugar.  This information is not intended to replace advice given to you by your health care provider. Make sure you discuss any questions you have with your health care provider.  Document Released: 12/18/2006 Document Revised: 09/04/2018 Document Reviewed: 09/04/2018  [employer] Interactive Patient Education © 2019 [employer]    Type 2 Diabetes Mellitus, Self Care, Adult  Caring for yourself after you have been diagnosed with type 2 diabetes (type 2 diabetes mellitus) means keeping your blood sugar (glucose) under control with a balance of:  · Nutrition.  · Exercise.  · Lifestyle changes.  · Medicines or insulin, if necessary.  · Support from your team of health care providers and others.  The following information explains what you need to know to manage your diabetes at home.  What are the risks?  Having diabetes can put you at risk for other long-term (chronic) conditions, such as heart disease and kidney disease. Your health care provider may prescribe medicines to help prevent complications from diabetes. These medicines may include:  · Aspirin.  · Medicine to lower cholesterol.  · Medicine to control blood pressure.  How to monitor blood glucose    · Check your blood glucose every day, as often as told by your health care provider.  · Have your A1c (hemoglobin A1c) level checked two or more times a year, or as often as told by your health care provider.  Your health care provider will set individualized treatment  goals for you. Generally, the goal of treatment is to maintain the following blood glucose levels:  · Before meals (preprandial):  mg/dL (4.4-7.2 mmol/L).  · After meals (postprandial): below 180 mg/dL (10 mmol/L).  · A1c level: less than 7%.  How to manage hyperglycemia and hypoglycemia  Hyperglycemia symptoms  Hyperglycemia, also called high blood glucose, occurs when blood glucose is too high. Make sure you know the early signs of hyperglycemia, such as:  · Increased thirst.  · Hunger.  · Feeling very tired.  · Needing to urinate more often than usual.  · Blurry vision.  Hypoglycemia symptoms  Hypoglycemia, also called low blood glucose, occurs with a blood glucose level at or below 70 mg/dL (3.9 mmol/L). The risk for hypoglycemia increases during or after exercise, during sleep, during illness, and when skipping meals or not eating for a long time (fasting).  It is important to know the symptoms of hypoglycemia and treat it right away. Always have a 15-gram rapid-acting carbohydrate snack with you to treat low blood glucose. Family members and close friends should also know the symptoms and should understand how to treat hypoglycemia, in case you are not able to treat yourself. Symptoms may include:  · Hunger.  · Anxiety.  · Sweating and feeling clammy.  · Confusion.  · Dizziness or feeling light-headed.  · Sleepiness.  · Nausea.  · Increased heart rate.  · Headache.  · Blurry vision.  · Irritability.  · A change in coordination.  · Tingling or numbness around the mouth, lips, or tongue.  · Restless sleep.  · Fainting.  · Seizure.  Treating hypoglycemia  If you are alert and able to swallow safely, follow the 15:15 rule:  · Take 15 grams of a rapid-acting carbohydrate. Talk with your health care provider about how much you should take.  · Rapid-acting options include:  ? Glucose pills (take 15 grams).  ? 6-8 pieces of hard candy.  ? 4-6 oz (120-150 mL) of fruit juice.  ? 4-6 oz (120-150 mL) of regular (not  diet) soda.  ? 1 Tbsp (15 mL) honey or sugar.  · Check your blood glucose 15 minutes after you take the carbohydrate.  · If the repeat blood glucose level is still at or below 70 mg/dL (3.9 mmol/L), take 15 grams of a carbohydrate again.  · If your blood glucose level does not increase above 70 mg/dL (3.9 mmol/L) after 3 tries, seek emergency medical care.  · After your blood glucose level returns to normal, eat a meal or a snack within 1 hour.  Treating severe hypoglycemia  Severe hypoglycemia is when your blood glucose level is at or below 54 mg/dL (3 mmol/L). Severe hypoglycemia is an emergency. Do not wait to see if the symptoms will go away. Get medical help right away. Call your local emergency services (911 in the [place]  If you have severe hypoglycemia and you cannot eat or drink, you may need an injection of glucagon. A family member or close friend should learn how to check your blood glucose and how to give you a glucagon injection. Ask your health care provider if you need to have an emergency glucagon injection kit available.  Severe hypoglycemia may need to be treated in a hospital. The treatment may include getting glucose through an IV. You may also need treatment for the cause of your hypoglycemia.  Follow these instructions at home:  Take diabetes medicines as told  · If your health care provider prescribed insulin or diabetes medicines, take them every day.  · Do not run out of insulin or other diabetes medicines that you take. Plan ahead so you always have these available.  · If you use insulin, adjust your dosage based on how physically active you are and what foods you eat. Your health care provider will tell you how to adjust your dosage.  Make healthy food choices    The things that you eat and drink affect your blood glucose and your insulin dosage. Making good choices helps to control your diabetes and prevent other health problems. A healthy meal plan includes eating lean proteins, complex  carbohydrates, fresh fruits and vegetables, low-fat dairy products, and healthy fats.  Make an appointment to see a diet and nutrition specialist (registered dietitian) to help you create an eating plan that is right for you. Make sure that you:  · Follow instructions from your health care provider about eating or drinking restrictions.  · Drink enough fluid to keep your urine pale yellow.  · Keep a record of the carbohydrates that you eat. Do this by reading food labels and learning the standard serving sizes of foods.  · Follow your sick day plan whenever you cannot eat or drink as usual. Make this plan in advance with your health care provider.    Stay active  Exercise regularly, as told by your health care provider. This may include:  · Stretching and doing strength exercises, such as yoga or weightlifting, 2 or more times a week.  · Doing 150 minutes or more of moderate-intensity or vigorous-intensity exercise each week. This could be brisk walking, biking, or water aerobics.  ? Spread out your activity over 3 or more days of the week.  ? Do not go more than 2 days in a row without doing some kind of physical activity.  When you start a new exercise or activity, work with your health care provider to adjust your insulin, medicines, or food intake as needed.  Make healthy lifestyle choices  · Do not use any tobacco products, such as cigarettes, chewing tobacco, and e-cigarettes. If you need help quitting, ask your health care provider.  · If your health care provider says that alcohol is safe for you, limit alcohol intake to no more than 1 drink per day for nonpregnant women and 2 drinks per day for men. One drink equals 12 oz of beer (355 mL), 5 oz of wine (148 mL), or 1½ oz of hard liquor (44 mL).  · Learn to manage stress. If you need help with this, ask your health care provider.  Care for your body    · Keep your immunizations up to date. In addition to getting vaccinations as told by your health care  provider, it is recommended that you get vaccinated against the following illnesses:  ? The flu (influenza). Get a flu shot every year.  ? Pneumonia.  ? Hepatitis B.  · Schedule an eye exam soon after your diagnosis, and then one time every year after that.  · Check your skin and feet every day for cuts, bruises, redness, blisters, or sores. Schedule a foot exam with your health care provider once every year.  · Brush your teeth and gums two times a day, and floss one or more times a day. Visit your dentist one or more times every 6 months.  · Maintain a healthy weight.  General instructions  · Take over-the-counter and prescription medicines only as told by your health care provider.  · Share your diabetes management plan with people in your workplace, school, and household.  · Carry a medical alert card or wear medical alert jewelry.  · Keep all follow-up visits as told by your health care provider. This is important.  Questions to ask your health care provider  · Do I need to meet with a diabetes educator?  · Where can I find a support group for people with diabetes?  Where to find more information  For more information about diabetes, visit:  · American Diabetes Association (ADA): www.diabetes.org  · American Association of Diabetes Educators (AADE): www.diabeteseducator.org  Summary  · Caring for yourself after you have been diagnosed with (type 2 diabetes mellitus) means keeping your blood sugar (glucose) under control with a balance of nutrition, exercise, lifestyle changes, and medicine.  · Check your blood glucose every day, as often as told by your health care provider.  · Having diabetes can put you at risk for other long-term (chronic) conditions, such as heart disease and kidney disease. Your health care provider may prescribe medicines to help prevent complications from diabetes.  · Keep all follow-up visits as told by your health care provider. This is important.  This information is not intended to  replace advice given to you by your health care provider. Make sure you discuss any questions you have with your health care provider.  Document Released: 04/10/2017 Document Revised: 06/10/2019 Document Reviewed: 01/20/2017  [employer] Interactive Patient Education © 2019 [employer]    Diabetes Mellitus and Foot Care  Foot care is an important part of your health, especially when you have diabetes. Diabetes may cause you to have problems because of poor blood flow (circulation) to your feet and legs, which can cause your skin to:  · Become thinner and drier.  · Break more easily.  · Heal more slowly.  · Peel and crack.  You may also have nerve damage (neuropathy) in your legs and feet, causing decreased feeling in them. This means that you may not notice minor injuries to your feet that could lead to more serious problems. Noticing and addressing any potential problems early is the best way to prevent future foot problems.  How to care for your feet  Foot hygiene  · Wash your feet daily with warm water and mild soap. Do not use hot water. Then, pat your feet and the areas between your toes until they are completely dry. Do not soak your feet as this can dry your skin.  · Trim your toenails straight across. Do not dig under them or around the cuticle. File the edges of your nails with an emery board or nail file.  · Apply a moisturizing lotion or petroleum jelly to the skin on your feet and to dry, brittle toenails. Use lotion that does not contain alcohol and is unscented. Do not apply lotion between your toes.  Shoes and socks  · Wear clean socks or stockings every day. Make sure they are not too tight. Do not wear knee-high stockings since they may decrease blood flow to your legs.  · Wear shoes that fit properly and have enough cushioning. Always look in your shoes before you put them on to be sure there are no objects inside.  · To break in new shoes, wear them for just a few hours a day. This prevents  injuries on your feet.  Wounds, scrapes, corns, and calluses  · Check your feet daily for blisters, cuts, bruises, sores, and redness. If you cannot see the bottom of your feet, use a mirror or ask someone for help.  · Do not cut corns or calluses or try to remove them with medicine.  · If you find a minor scrape, cut, or break in the skin on your feet, keep it and the skin around it clean and dry. You may clean these areas with mild soap and water. Do not clean the area with peroxide, alcohol, or iodine.  · If you have a wound, scrape, corn, or callus on your foot, look at it several times a day to make sure it is healing and not infected. Check for:  ? Redness, swelling, or pain.  ? Fluid or blood.  ? Warmth.  ? Pus or a bad smell.  General instructions  · Do not cross your legs. This may decrease blood flow to your feet.  · Do not use heating pads or hot water bottles on your feet. They may burn your skin. If you have lost feeling in your feet or legs, you may not know this is happening until it is too late.  · Protect your feet from hot and cold by wearing shoes, such as at the beach or on hot pavement.  · Schedule a complete foot exam at least once a year (annually) or more often if you have foot problems. If you have foot problems, report any cuts, sores, or bruises to your health care provider immediately.  Contact a health care provider if:  · You have a medical condition that increases your risk of infection and you have any cuts, sores, or bruises on your feet.  · You have an injury that is not healing.  · You have redness on your legs or feet.  · You feel burning or tingling in your legs or feet.  · You have pain or cramps in your legs and feet.  · Your legs or feet are numb.  · Your feet always feel cold.  · You have pain around a toenail.  Get help right away if:  · You have a wound, scrape, corn, or callus on your foot and:  ? You have pain, swelling, or redness that gets worse.  ? You have fluid or  blood coming from the wound, scrape, corn, or callus.  ? Your wound, scrape, corn, or callus feels warm to the touch.  ? You have pus or a bad smell coming from the wound, scrape, corn, or callus.  ? You have a fever.  ? You have a red line going up your leg.  Summary  · Check your feet every day for cuts, sores, red spots, swelling, and blisters.  · Moisturize feet and legs daily.  · Wear shoes that fit properly and have enough cushioning.  · If you have foot problems, report any cuts, sores, or bruises to your health care provider immediately.  · Schedule a complete foot exam at least once a year (annually) or more often if you have foot problems.  This information is not intended to replace advice given to you by your health care provider. Make sure you discuss any questions you have with your health care provider.  Document Released: 12/15/2001 Document Revised: 01/30/2019 Document Reviewed: 01/19/2018  [employer] Interactive Patient Education © 2019 [employer]    Carbohydrate Counting for Diabetes Mellitus, Adult    Carbohydrate counting is a method of keeping track of how many carbohydrates you eat. Eating carbohydrates naturally increases the amount of sugar (glucose) in the blood. Counting how many carbohydrates you eat helps keep your blood glucose within normal limits, which helps you manage your diabetes (diabetes mellitus).  It is important to know how many carbohydrates you can safely have in each meal. This is different for every person. A diet and nutrition specialist (registered dietitian) can help you make a meal plan and calculate how many carbohydrates you should have at each meal and snack.  Carbohydrates are found in the following foods:  · Grains, such as breads and cereals.  · Dried beans and soy products.  · Starchy vegetables, such as potatoes, peas, and corn.  · Fruit and fruit juices.  · Milk and yogurt.  · Sweets and snack foods, such as cake, cookies, candy, chips, and soft drinks.  How  "do I count carbohydrates?  There are two ways to count carbohydrates in food. You can use either of the methods or a combination of both.  Reading \"Nutrition Facts\" on packaged food  The \"Nutrition Facts\" list is included on the labels of almost all packaged foods and beverages in the [place] It includes:  · The serving size.  · Information about nutrients in each serving, including the grams (g) of carbohydrate per serving.  To use the “Nutrition Facts\":  · Decide how many servings you will have.  · Multiply the number of servings by the number of carbohydrates per serving.  · The resulting number is the total amount of carbohydrates that you will be having.  Learning standard serving sizes of other foods  When you eat carbohydrate foods that are not packaged or do not include \"Nutrition Facts\" on the label, you need to measure the servings in order to count the amount of carbohydrates:  · Measure the foods that you will eat with a food scale or measuring cup, if needed.  · Decide how many standard-size servings you will eat.  · Multiply the number of servings by 15. Most carbohydrate-rich foods have about 15 g of carbohydrates per serving.  ? For example, if you eat 8 oz (170 g) of strawberries, you will have eaten 2 servings and 30 g of carbohydrates (2 servings x 15 g = 30 g).  · For foods that have more than one food mixed, such as soups and casseroles, you must count the carbohydrates in each food that is included.  The following list contains standard serving sizes of common carbohydrate-rich foods. Each of these servings has about 15 g of carbohydrates:  · ½ hamburger bun or ½ English muffin.  · ½ oz (15 mL) syrup.  · ½ oz (14 g) jelly.  · 1 slice of bread.  · 1 six-inch tortilla.  · 3 oz (85 g) cooked rice or pasta.  · 4 oz (113 g) cooked dried beans.  · 4 oz (113 g) starchy vegetable, such as peas, corn, or potatoes.  · 4 oz (113 g) hot cereal.  · 4 oz (113 g) mashed potatoes or ¼ of a large baked " potato.  · 4 oz (113 g) canned or frozen fruit.  · 4 oz (120 mL) fruit juice.  · 4-6 crackers.  · 6 chicken nuggets.  · 6 oz (170 g) unsweetened dry cereal.  · 6 oz (170 g) plain fat-free yogurt or yogurt sweetened with artificial sweeteners.  · 8 oz (240 mL) milk.  · 8 oz (170 g) fresh fruit or one small piece of fruit.  · 24 oz (680 g) popped popcorn.  Example of carbohydrate counting  Sample meal  · 3 oz (85 g) chicken breast.  · 6 oz (170 g) brown rice.  · 4 oz (113 g) corn.  · 8 oz (240 mL) milk.  · 8 oz (170 g) strawberries with sugar-free whipped topping.  Carbohydrate calculation  1. Identify the foods that contain carbohydrates:  ? Rice.  ? Corn.  ? Milk.  ? Strawberries.  2. Calculate how many servings you have of each food:  ? 2 servings rice.  ? 1 serving corn.  ? 1 serving milk.  ? 1 serving strawberries.  3. Multiply each number of servings by 15 g:  ? 2 servings rice x 15 g = 30 g.  ? 1 serving corn x 15 g = 15 g.  ? 1 serving milk x 15 g = 15 g.  ? 1 serving strawberries x 15 g = 15 g.  4. Add together all of the amounts to find the total grams of carbohydrates eaten:  ? 30 g + 15 g + 15 g + 15 g = 75 g of carbohydrates total.  Summary  · Carbohydrate counting is a method of keeping track of how many carbohydrates you eat.  · Eating carbohydrates naturally increases the amount of sugar (glucose) in the blood.  · Counting how many carbohydrates you eat helps keep your blood glucose within normal limits, which helps you manage your diabetes.  · A diet and nutrition specialist (registered dietitian) can help you make a meal plan and calculate how many carbohydrates you should have at each meal and snack.  This information is not intended to replace advice given to you by your health care provider. Make sure you discuss any questions you have with your health care provider.  Document Released: 12/18/2006 Document Revised: 06/27/2018 Document Reviewed: 05/31/2017  [employer] Patient Education ©  2019 [employer]

## 2019-12-30 ENCOUNTER — OFFICE VISIT (OUTPATIENT)
Dept: PODIATRY | Facility: CLINIC | Age: 59
End: 2019-12-30

## 2019-12-30 VITALS — HEIGHT: 68 IN | BODY MASS INDEX: 37.59 KG/M2 | HEART RATE: 77 BPM | OXYGEN SATURATION: 98 % | WEIGHT: 248 LBS

## 2019-12-30 DIAGNOSIS — Q66.70 PES CAVUS: ICD-10-CM

## 2019-12-30 DIAGNOSIS — M21.172 SUBTALAR VARUS, ACQUIRED, LEFT: Primary | ICD-10-CM

## 2019-12-30 PROCEDURE — 99024 POSTOP FOLLOW-UP VISIT: CPT | Performed by: PODIATRIST

## 2019-12-30 NOTE — PROGRESS NOTES
Ariana Luis Martinez  1960  59 y.o. female   PCP: Kimberly Ferguson 12/26/19  BS - 138 this morning per patient     Patient presents today for a post op follow up on the left foot.     12/30/2019      Chief Complaint   Patient presents with   • Left Foot - Follow-up       History of Present Illness    Ms Martinez is a 58 y/o diabetic female who presents for follow-up of left subtalar joint arthrodesis, hardware removal and gastrocnemius recession.  She is doing well overall with no reported pain today.  She has been weightbearing as tolerated in her cam boot since last visit.  She does note some mild intermittent ankle swelling.    Past Medical History:   Diagnosis Date   • Angina, class IV (CMS/HCC)    • Anxiety    • Anxiety and depression    • Benign paroxysmal positional vertigo    • Bladder disorder     has bladder stimulator   • Carpal tunnel syndrome    • Chronic pain    • Coronary atherosclerosis     hx CABG 2005.  is followed by Dr Kwon   • Depression    • Diabetes mellitus (CMS/HCC)     Type 2, controlled   • Diabetic polyneuropathy (CMS/HCC)    • Elevated cholesterol    • Female stress incontinence    • Foot pain, left    • Full dentures    • Gastroparesis    • GERD (gastroesophageal reflux disease)    • Hyperlipidemia    • Hypertension    • Low back pain    • Malaise and fatigue    • Multiple joint pain    • Obesity     Refuses to be weighed   • Otalgia     Both   • Perforation of tympanic membrane     Left   • Postoperative wound infection    • Vitamin D deficiency    • Wears glasses     reading         Past Surgical History:   Procedure Laterality Date   • ABDOMINAL SURGERY     • ANGIOPLASTY      coronary   • BREAST BIOPSY Right    • CARDIAC CATHETERIZATION     • CARDIAC CATHETERIZATION N/A 6/20/2017    Procedure: Right Heart Cath;  Surgeon: Can Kwon MD PhD;  Location: [facility] INVASIVE LOCATION;  Service:    • CARPAL TUNNEL RELEASE     • CHOLECYSTECTOMY     • CORONARY ARTERY BYPASS  GRAFT  2005   • ENDOSCOPY N/A 10/19/2018    Procedure: ESOPHAGOGASTRODUODENOSCOPY possible dilation;  Surgeon: Julián Maldonado MD;  Location: [facility] ENDOSCOPY;  Service: Gastroenterology   • ENDOSCOPY AND COLONOSCOPY     • FOOT SURGERY      Toes   • FOOT SURGERY     • GASTRIC BANDING      Revision, laparoscopic   • HYSTERECTOMY     • MOUTH SURGERY     • SALPINGO OOPHORECTOMY     • SHOULDER SURGERY     • SUBTALAR ARTHRODESIS Left 1/16/2019    Procedure: LEFT FOOT HARDWARE REMOVAL, FIFTH METATARSAL , OPEN REDUCTION INTERNAL FIXATION, CALCANEAL OSTEOTOMY;  Surgeon: Ignacio Lord DPM;  Location: [facility] OR;  Service: Podiatry   • SUBTALAR ARTHRODESIS Left 10/16/2019    Procedure: foot hardware removal, subtalar joint fusion  possible de/reattachment of achilles tendon        (c-arm);  Surgeon: Ignacio Lord DPM;  Location: [facility] OR;  Service: Podiatry   • TRANSESOPHAGEAL ECHOCARDIOGRAM (LAMONTE)      With color flow         Family History   Problem Relation Age of Onset   • Diabetes Other    • Heart disease Other    • Hypertension Other    • Heart disease Mother    • Stroke Mother    • Hypertension Mother    • Diabetes Sister    • Heart disease Sister    • Hypertension Sister    • Heart disease Sister    • Diabetes Sister    • Hypertension Sister    • Diabetes Sister    • Diabetes Sister    • Diabetes Sister    • Diabetes Sister          Social History     Socioeconomic History   • Marital status:      Spouse name: Not on file   • Number of children: Not on file   • Years of education: Not on file   • Highest education level: Not on file   Tobacco Use   • Smoking status: Never Smoker   • Smokeless tobacco: Never Used   Substance and Sexual Activity   • Alcohol use: No   • Drug use: No   • Sexual activity: Defer         Current Outpatient Medications   Medication Sig Dispense Refill   • amoxicillin (AMOXIL) 500 MG tablet Take 1 tablet by mouth 3 (Three) Times a Day for 10 days. 21 tablet 0   •  ARIPiprazole (ABILIFY) 15 MG tablet Take 1 tablet by mouth Daily. 90 tablet 1   • aspirin 81 MG chewable tablet Chew 81 mg daily.     • atorvastatin (LIPITOR) 80 MG tablet Take 1 tablet by mouth Daily. 90 tablet 1   • BD SHARPS CONTAINER HOME misc 1 each Take As Directed. 1 each 0   • Blood Glucose Monitoring Suppl (ONE TOUCH ULTRA MINI) w/Device kit USE AS DIRECTED TO CHECK BLOOD SUGAR 1 each 0   • Calcium Citrate-Vitamin D (CITRACAL/VITAMIN D) 250-200 MG-UNIT tablet Take 2 tablets by mouth 2 (two) times a day.     • clopidogrel (PLAVIX) 75 MG tablet Take 75 mg by mouth Daily.     • cyanocobalamin 1000 MCG/ML injection INJECT 1 ML INTO THE APPROPRIATE MUSCLE AS DIRECTED BY PRESCRIBER EVERY 28 (TWENTY-EIGHT) DAYS. 1 mL 0   • fluticasone (FLONASE) 50 MCG/ACT nasal spray 1 spray into the nostril(s) as directed by provider Daily. 1 bottle 0   • FLUZONE QUADRIVALENT 0.5 ML suspension prefilled syringe injection      • furosemide (LASIX) 40 MG tablet Take 40 mg by mouth Daily.  3   • gabapentin (NEURONTIN) 400 MG capsule Take 1 capsule by mouth 3 (Three) Times a Day. 90 capsule 2   • GLUCAGON EMERGENCY 1 MG injection USE AS DIRECTED AS NEEDED 1 kit 0   • glucose blood (ONE TOUCH ULTRA TEST) test strip 1 each by Other route 4 (Four) Times a Day. Use as instructed 400 each 1   • hydroCHLOROthiazide (HYDRODIURIL) 12.5 MG tablet Take 12.5 mg by mouth Daily.     • HYDROcodone-acetaminophen (NORCO) 7.5-325 MG per tablet Take 1 tablet by mouth Every 4 (Four) Hours As Needed for Moderate Pain  or Severe Pain . 180 tablet 0   • insulin aspart (novoLOG FLEXPEN) 100 UNIT/ML solution pen-injector sc pen Inject 60 Units under the skin into the appropriate area as directed 3 (Three) Times a Day With Meals.     • Insulin Glargine (BASAGLAR KWIKPEN) 100 UNIT/ML injection pen Inject 100 Units under the skin into the appropriate area as directed Every Night. 6 pen 11   • Insulin Pen Needle (B-D ULTRAFINE III SHORT PEN) 31G X 8 MM misc USE  "TO INJECT 4 TIMES A  each 11   • LORazepam (ATIVAN) 0.5 MG tablet Take 1 tablet by mouth Daily As Needed for Anxiety. 30 tablet 0   • meclizine (ANTIVERT) 25 MG tablet Take 1 tablet by mouth 3 (Three) Times a Day As Needed for dizziness. 90 tablet 6   • metoclopramide (REGLAN) 10 MG tablet Take 10 mg by mouth 4 (Four) Times a Day As Needed.     • metoprolol succinate XL (TOPROL-XL) 25 MG 24 hr tablet TAKE 1 TABLET BY MOUTH DAILY. 31 tablet 6   • mirtazapine (REMERON) 45 MG tablet TAKE 1 TABLET BY MOUTH EVERY NIGHT. 90 tablet 0   • ONE TOUCH ULTRA TEST test strip USE TO TEST SUGAR 4 TIMES A  each 3   • potassium chloride (K-DUR,KLOR-CON) 10 MEQ CR tablet Take 1 tablet twice daily Fri, Sat , Sun and one Mon am. Return to lab for retest Monday am. Then as directed subseuquently 60 tablet 2   • promethazine-dextromethorphan (PROMETHAZINE-DM) 6.25-15 MG/5ML syrup Take 5 mL by mouth At Night As Needed for Cough. 120 mL 0   • RABEprazole (ACIPHEX) 20 MG EC tablet Take 1 tablet by mouth Daily. For acid reflux 30 tablet 5   • Syringe/Needle, Disp, (LUER LOCK SAFETY SYRINGES) 25G X 5/8\" 3 ML misc 1 each Daily. 100 each 0   • venlafaxine XR (EFFEXOR-XR) 150 MG 24 hr capsule TAKE 1 CAPSULE BY MOUTH TWICE A DAY 60 capsule 2   • vitamin D (ERGOCALCIFEROL) 1.25 MG (26603 UT) capsule capsule TAKE ONE CAPSULE BY MOUTH EVERY SUNDAY. 12 capsule 2     No current facility-administered medications for this visit.      Review of Systems   Constitutional: Negative.    HENT: Negative.    Eyes: Negative.    Respiratory: Negative.    Cardiovascular: Negative.    Gastrointestinal: Negative.    Endocrine: Negative.    Genitourinary: Negative.    Musculoskeletal: Negative.         Left foot pain    Skin: Negative.    Allergic/Immunologic: Negative.    Neurological: Positive for numbness.   Hematological: Negative.    Psychiatric/Behavioral: Negative.          OBJECTIVE    Pulse 77   Ht 172.7 cm (68\")   Wt 112 kg (248 lb)   SpO2 " 98%   BMI 37.71 kg/m²         Physical Exam   Constitutional: she appears well-developed and well-nourished.   Psychiatric: she has a normal mood and affect. her   behavior is normal.      Lower Extremity Exam:  Neurovascular status intact  Cap Refill time brisk   Negative Conrad  1+ edema to left ankle  Left foot and ankle incisions healed.  No erythema.  No fluctuance.  No active drainage from incision sites.  Subtalar joint rectus, rigid    Procedures        ASSESSMENT AND PLAN    Ariana was seen today for follow-up.    Diagnoses and all orders for this visit:    Subtalar varus, acquired, left  -     XR Foot 3+ View Left  -     Ambulatory Referral to Physical Therapy Evaluate and treat, Ortho; Strengthening; Left; Full weight bearing    Pes cavus  -     Ambulatory Referral to Physical Therapy Evaluate and treat, Ortho; Strengthening; Left; Full weight bearing        -Doing well postoperatively  -Radiographs ordered and reviewed  -Continue cam boot, weightbearing as tolerated over the next couple weeks.  We will initiate physical therapy for strengthening and gait training as she transitions out of the boot.  -Recheck 2 weeks          This document has been electronically signed by Ignacio Lord DPM on December 30, 2019 10:07 AM

## 2020-01-07 DIAGNOSIS — E61.1 IRON DEFICIENCY: Primary | ICD-10-CM

## 2020-01-07 DIAGNOSIS — D75.839 THROMBOCYTOSIS: ICD-10-CM

## 2020-01-13 ENCOUNTER — OFFICE VISIT (OUTPATIENT)
Dept: ONCOLOGY | Facility: CLINIC | Age: 60
End: 2020-01-13

## 2020-01-13 ENCOUNTER — LAB (OUTPATIENT)
Dept: ONCOLOGY | Facility: HOSPITAL | Age: 60
End: 2020-01-13

## 2020-01-13 ENCOUNTER — OFFICE VISIT (OUTPATIENT)
Dept: PODIATRY | Facility: CLINIC | Age: 60
End: 2020-01-13

## 2020-01-13 VITALS — BODY MASS INDEX: 37.59 KG/M2 | OXYGEN SATURATION: 98 % | WEIGHT: 248 LBS | HEART RATE: 158 BPM | HEIGHT: 68 IN

## 2020-01-13 VITALS
HEIGHT: 68 IN | SYSTOLIC BLOOD PRESSURE: 98 MMHG | WEIGHT: 250.2 LBS | RESPIRATION RATE: 18 BRPM | BODY MASS INDEX: 37.92 KG/M2 | TEMPERATURE: 97.8 F | DIASTOLIC BLOOD PRESSURE: 70 MMHG | HEART RATE: 79 BPM

## 2020-01-13 DIAGNOSIS — E61.1 IRON DEFICIENCY: ICD-10-CM

## 2020-01-13 DIAGNOSIS — Q66.70 PES CAVUS: ICD-10-CM

## 2020-01-13 DIAGNOSIS — M21.172 SUBTALAR VARUS, ACQUIRED, LEFT: Primary | ICD-10-CM

## 2020-01-13 DIAGNOSIS — D72.828 OTHER ELEVATED WHITE BLOOD CELL (WBC) COUNT: ICD-10-CM

## 2020-01-13 DIAGNOSIS — T45.4X5A ADVERSE EFFECT OF IRON, INITIAL ENCOUNTER: ICD-10-CM

## 2020-01-13 DIAGNOSIS — D75.839 THROMBOCYTOSIS: ICD-10-CM

## 2020-01-13 DIAGNOSIS — R26.81 GAIT INSTABILITY: ICD-10-CM

## 2020-01-13 DIAGNOSIS — E61.1 IRON DEFICIENCY: Primary | ICD-10-CM

## 2020-01-13 LAB
BASOPHILS # BLD AUTO: 0.07 10*3/MM3 (ref 0–0.2)
BASOPHILS NFR BLD AUTO: 0.6 % (ref 0–1.5)
DEPRECATED RDW RBC AUTO: 43.8 FL (ref 37–54)
EOSINOPHIL # BLD AUTO: 0.5 10*3/MM3 (ref 0–0.4)
EOSINOPHIL NFR BLD AUTO: 4.5 % (ref 0.3–6.2)
ERYTHROCYTE [DISTWIDTH] IN BLOOD BY AUTOMATED COUNT: 13.5 % (ref 12.3–15.4)
FERRITIN SERPL-MCNC: 165 NG/ML (ref 13–150)
FOLATE SERPL-MCNC: 10.8 NG/ML (ref 4.78–24.2)
HCT VFR BLD AUTO: 43 % (ref 34–46.6)
HGB BLD-MCNC: 14.2 G/DL (ref 12–15.9)
IMM GRANULOCYTES # BLD AUTO: 0.05 10*3/MM3 (ref 0–0.05)
IMM GRANULOCYTES NFR BLD AUTO: 0.5 % (ref 0–0.5)
IRON 24H UR-MRATE: 105 MCG/DL (ref 37–145)
IRON SATN MFR SERPL: 30 % (ref 20–50)
LYMPHOCYTES # BLD AUTO: 2.81 10*3/MM3 (ref 0.7–3.1)
LYMPHOCYTES NFR BLD AUTO: 25.5 % (ref 19.6–45.3)
MCH RBC QN AUTO: 29.3 PG (ref 26.6–33)
MCHC RBC AUTO-ENTMCNC: 33 G/DL (ref 31.5–35.7)
MCV RBC AUTO: 88.8 FL (ref 79–97)
MONOCYTES # BLD AUTO: 0.65 10*3/MM3 (ref 0.1–0.9)
MONOCYTES NFR BLD AUTO: 5.9 % (ref 5–12)
NEUTROPHILS # BLD AUTO: 6.95 10*3/MM3 (ref 1.7–7)
NEUTROPHILS NFR BLD AUTO: 63 % (ref 42.7–76)
NRBC BLD AUTO-RTO: 0 /100 WBC (ref 0–0.2)
PLATELET # BLD AUTO: 434 10*3/MM3 (ref 140–450)
PMV BLD AUTO: 9.5 FL (ref 6–12)
RBC # BLD AUTO: 4.84 10*6/MM3 (ref 3.77–5.28)
TIBC SERPL-MCNC: 352 MCG/DL (ref 298–536)
TRANSFERRIN SERPL-MCNC: 236 MG/DL (ref 200–360)
VIT B12 BLD-MCNC: 763 PG/ML (ref 211–946)
WBC NRBC COR # BLD: 11.03 10*3/MM3 (ref 3.4–10.8)

## 2020-01-13 PROCEDURE — 99024 POSTOP FOLLOW-UP VISIT: CPT | Performed by: PODIATRIST

## 2020-01-13 PROCEDURE — G9903 PT SCRN TBCO ID AS NON USER: HCPCS | Performed by: INTERNAL MEDICINE

## 2020-01-13 PROCEDURE — 82728 ASSAY OF FERRITIN: CPT | Performed by: INTERNAL MEDICINE

## 2020-01-13 PROCEDURE — 85025 COMPLETE CBC W/AUTO DIFF WBC: CPT | Performed by: INTERNAL MEDICINE

## 2020-01-13 PROCEDURE — 82746 ASSAY OF FOLIC ACID SERUM: CPT | Performed by: INTERNAL MEDICINE

## 2020-01-13 PROCEDURE — G0463 HOSPITAL OUTPT CLINIC VISIT: HCPCS | Performed by: INTERNAL MEDICINE

## 2020-01-13 PROCEDURE — 83540 ASSAY OF IRON: CPT | Performed by: INTERNAL MEDICINE

## 2020-01-13 PROCEDURE — G8731 PAIN NEG NO PLAN: HCPCS | Performed by: INTERNAL MEDICINE

## 2020-01-13 PROCEDURE — 1157F ADVNC CARE PLAN IN RCRD: CPT | Performed by: INTERNAL MEDICINE

## 2020-01-13 PROCEDURE — 84466 ASSAY OF TRANSFERRIN: CPT | Performed by: INTERNAL MEDICINE

## 2020-01-13 PROCEDURE — 99214 OFFICE O/P EST MOD 30 MIN: CPT | Performed by: INTERNAL MEDICINE

## 2020-01-13 PROCEDURE — 82607 VITAMIN B-12: CPT | Performed by: INTERNAL MEDICINE

## 2020-01-13 NOTE — PROGRESS NOTES
DATE OF VISIT: 1/13/2020      REASON FOR VISIT: Leukocytosis,iron deficiency      HISTORY OF PRESENT ILLNESS:    60-year-old female with medical problem consisting of coronary artery disease status post CABG, type 2 diabetes mellitus with neuropathy affecting upper and lower extremity, gastroparesis, dyslipidemia, obesity status post lap banding and reversal around 2014 was initially seen in consultation on September 30, 2019 for evaluation of leukocytosis and thrombocytosis.  Due to iron deficiency and inability to tolerate iron by mouth, patient received 2 dose of intravenous Feraheme on October 23, 2019 and October 30, 2019.  Patient is here for follow-up appointment today.  States her fatigue is somewhat improved.  Denies any bleeding.  Denies any new lymph node enlargement.    PAST MEDICAL HISTORY:    Past Medical History:   Diagnosis Date   • Angina, class IV (CMS/HCC)    • Anxiety    • Anxiety and depression    • Benign paroxysmal positional vertigo    • Bladder disorder     has bladder stimulator   • Carpal tunnel syndrome    • Chronic pain    • Coronary atherosclerosis     hx CABG 2005.  is followed by Dr Kwon   • Depression    • Diabetes mellitus (CMS/HCC)     Type 2, controlled   • Diabetic polyneuropathy (CMS/HCC)    • Elevated cholesterol    • Female stress incontinence    • Foot pain, left    • Full dentures    • Gastroparesis    • GERD (gastroesophageal reflux disease)    • Hyperlipidemia    • Hypertension    • Low back pain    • Malaise and fatigue    • Multiple joint pain    • Obesity     Refuses to be weighed   • Otalgia     Both   • Perforation of tympanic membrane     Left   • Postoperative wound infection    • Vitamin D deficiency    • Wears glasses     reading       SOCIAL HISTORY:    Social History     Tobacco Use   • Smoking status: Never Smoker   • Smokeless tobacco: Never Used   Substance Use Topics   • Alcohol use: No   • Drug use: No       Surgical History :  Past Surgical History:    Procedure Laterality Date   • ABDOMINAL SURGERY     • ANGIOPLASTY      coronary   • BREAST BIOPSY Right    • CARDIAC CATHETERIZATION     • CARDIAC CATHETERIZATION N/A 6/20/2017    Procedure: Right Heart Cath;  Surgeon: Can Kwon MD PhD;  Location: [facility] CATH INVASIVE LOCATION;  Service:    • CARPAL TUNNEL RELEASE     • CHOLECYSTECTOMY     • CORONARY ARTERY BYPASS GRAFT  2005   • ENDOSCOPY N/A 10/19/2018    Procedure: ESOPHAGOGASTRODUODENOSCOPY possible dilation;  Surgeon: Julián Maldonado MD;  Location: [facility] ENDOSCOPY;  Service: Gastroenterology   • ENDOSCOPY AND COLONOSCOPY     • FOOT SURGERY      Toes   • FOOT SURGERY     • GASTRIC BANDING      Revision, laparoscopic   • HYSTERECTOMY     • MOUTH SURGERY     • SALPINGO OOPHORECTOMY     • SHOULDER SURGERY     • SUBTALAR ARTHRODESIS Left 1/16/2019    Procedure: LEFT FOOT HARDWARE REMOVAL, FIFTH METATARSAL , OPEN REDUCTION INTERNAL FIXATION, CALCANEAL OSTEOTOMY;  Surgeon: Ignacio Lord DPM;  Location: [facility] OR;  Service: Podiatry   • SUBTALAR ARTHRODESIS Left 10/16/2019    Procedure: foot hardware removal, subtalar joint fusion  possible de/reattachment of achilles tendon        (c-arm);  Surgeon: Ignacio Lord DPM;  Location: [facility] OR;  Service: Podiatry   • TRANSESOPHAGEAL ECHOCARDIOGRAM (LAMONTE)      With color flow       ALLERGIES:    Allergies   Allergen Reactions   • Seroquel [Quetiapine] Anaphylaxis   • Avandia [Rosiglitazone] Swelling   • Morphine And Related Hallucinations   • Oxycodone Hallucinations         FAMILY HISTORY:  Family History   Problem Relation Age of Onset   • Diabetes Other    • Heart disease Other    • Hypertension Other    • Heart disease Mother    • Stroke Mother    • Hypertension Mother    • Diabetes Sister    • Heart disease Sister    • Hypertension Sister    • Heart disease Sister    • Diabetes Sister    • Hypertension Sister    • Diabetes Sister    • Diabetes Sister    • Diabetes Sister    • Diabetes Sister  "           REVIEW OF SYSTEMS:      CONSTITUTIONAL:   States her fatigue is somewhat improved after intravenous Feraheme.    Has lost 10 pounds since last clinic visit.  Denies any fever, chills .      EYES: No visual disturbances. No discharge. No new lesions     ENMT:  No epistaxis, mouth sores or difficulty swallowing.     RESPIRATORY:  No new shortness of breath. No new cough or hemoptysis.     CARDIOVASCULAR:  No chest pain or palpitations.     GASTROINTESTINAL: Complains of intermittent nausea and vomiting due to gastroparesis.  No abdominal pain nausea, vomiting or blood in the stool.     GENITOURINARY: No Dysuria or Hematuria.     MUSCULOSKELETAL: Complains of chronic arthritic pain affecting back and other joints.     LYMPHATICS:  Denies any abnormal swollen glands anywhere in the body.     NEUROLOGICAL : Complains of tingling and numbness affecting upper and lower extremity. No headache or dizziness. No seizures or balance problems.     SKIN: No new skin lesions.     ENDOCRINE : No new hot or cold intolerance. No new polyuria . No polydipsia.            PHYSICAL EXAMINATION:      VITAL SIGNS:  BP 98/70   Pulse 79   Temp 97.8 °F (36.6 °C) (Temporal)   Resp 18   Ht 172.7 cm (67.99\")   Wt 113 kg (250 lb 3.2 oz)   BMI 38.05 kg/m²       01/13/20  1138   Weight: 113 kg (250 lb 3.2 oz)         ECOG performance status: 2     CONSTITUTIONAL:  Not in any distress.     EYES: Mild conjunctival Pallor. No Icterus. No Pterygium. Extraocular Movements intact.No ptosis.     ENMT:  Normocephalic, Atraumatic.No Facial Asymmetry noted.     NECK:  No adenopathy.Trachea midline. NO JVD.     RESPIRATORY:  Fair air entry bilateral. No rhonchi or wheezing.Fair respiratory effort.     CARDIOVASCULAR:  S1, S2. Regular rate and rhythm. No murmur or gallop appreciated.     ABDOMEN:  Soft, obese, nontender. Bowel sounds present in all four quadrants.  No Hepatosplenomegaly appreciated.     MUSCULOSKELETAL:  No edema.No Calf " Tenderness.Decreased range of motion.     NEUROLOGIC:    No  Motor  deficit appreciated. Cranial Nerves 2-12 grossly intact.     SKIN : No new skin lesion identified. Skin is warm and dry to touch.     LYMPHATICS: No new enlarged lymph nodes in neck or supraclavicular area.     PSYCHIATRY: Alert, awake and oriented ×3.      DIAGNOSTIC DATA:    Glucose   Date Value Ref Range Status   12/16/2019 181 (H) 65 - 99 mg/dL Final     Glucose, Arterial   Date Value Ref Range Status   10/14/2017 155 mmol/L Final     Sodium   Date Value Ref Range Status   12/16/2019 135 (L) 136 - 145 mmol/L Final     Potassium   Date Value Ref Range Status   12/16/2019 3.7 3.5 - 5.2 mmol/L Final     CO2   Date Value Ref Range Status   12/16/2019 25.4 22.0 - 29.0 mmol/L Final     Chloride   Date Value Ref Range Status   12/16/2019 97 (L) 98 - 107 mmol/L Final     Anion Gap   Date Value Ref Range Status   12/16/2019 12.6 5.0 - 15.0 mmol/L Final     Creatinine   Date Value Ref Range Status   12/16/2019 0.93 0.57 - 1.00 mg/dL Final     BUN   Date Value Ref Range Status   12/16/2019 9 6 - 20 mg/dL Final     BUN/Creatinine Ratio   Date Value Ref Range Status   12/16/2019 9.7 7.0 - 25.0 Final     Calcium   Date Value Ref Range Status   12/16/2019 9.5 8.6 - 10.5 mg/dL Final     eGFR Non  Amer   Date Value Ref Range Status   12/16/2019 62 >60 mL/min/1.73 Final     Alkaline Phosphatase   Date Value Ref Range Status   12/09/2019 110 39 - 117 U/L Final     Total Protein   Date Value Ref Range Status   12/09/2019 7.6 6.0 - 8.5 g/dL Final     ALT (SGPT)   Date Value Ref Range Status   12/09/2019 24 1 - 33 U/L Final     AST (SGOT)   Date Value Ref Range Status   12/09/2019 18 1 - 32 U/L Final     Total Bilirubin   Date Value Ref Range Status   12/09/2019 0.4 0.2 - 1.2 mg/dL Final     Albumin   Date Value Ref Range Status   12/09/2019 4.20 3.50 - 5.20 g/dL Final     Globulin   Date Value Ref Range Status   12/09/2019 3.4 gm/dL Final     Lab Results    Component Value Date    WBC 11.03 (H) 01/13/2020    HGB 14.2 01/13/2020    HCT 43.0 01/13/2020    MCV 88.8 01/13/2020     01/13/2020     Lab Results   Component Value Date    NEUTROABS 6.95 01/13/2020    IRON 105 01/13/2020    TIBC 352 01/13/2020    LABIRON 30 01/13/2020    FERRITIN 165.00 (H) 01/13/2020    [id number] 757 09/06/2019    FOLATE 9.30 07/21/2018     Lab Results   Component Value Date    REFLABREPO SEE ATTACHED REPORT 09/30/2019    REFLABREPO SEE ATTACHED REPORT 09/30/2019       Lactate Dehydrogenase   Component  Ref Range & Units 2wk ago   LDH  135 - 214 U/L 192    Resulting Agency [facility]         Specimen Collected: 09/30/19 15:59 Last Resulted: 09/30/19 16:42               Tony 2 mutation including exon 12, CALR, MPL mutation was negative on peripheral blood on September 30, 2019.          ASSESSMENT AND PLAN:      1.  Thrombocytosis:  - Patient has intermittent thrombocytosis since 2015.  -Platelet count done today on January 13, 2020 is normal at 434,000.  - Extensive work-up including Tony 2 mutation with exon 12, CALR mutation, MPL mutation done on September 30, 2019 is negative.  - At this point will continue with clinical monitoring.  Will ask patient to return to clinic in 3 months with repeat CBC and anemia work-up to be done    2.  Leukocytosis:  - Patient has persistent leukocytosis since 2015.  -White blood cell count was elevated at 11,000.  - CANDICE R mutation, MPL mutation, Tony 2 mutation including exon 12 were negative.  At this point will continue with clinical monitoring.  - If patient has any worsening leukocytosis or thrombocytosis in future, she will need bone marrow biopsy which was discussed with patient.    3.  Iron deficiency:  -Patient is found to have iron deficiency with iron saturation of only 11% and ferritin of 46.  Patient states in the past she is not able to tolerate iron by mouth.  -Patient received 2 dose of intravenous Feraheme on October 23, 2019 and October  30 2019.  -Iron studies done today on January 13, 2020 shows adequate amount of iron.  No need to do intravenous Feraheme at present.  -Patient remains on monthly B12 injection by primary medical provider.  -We will ask patient to return to clinic in 3 months with repeat CBC and anemia work-up to be done on that day.       4.  Diabetes mellitus with neuropathy     5.  Coronary artery disease status post CABG     6.  Hypertension        7.  Status post hysterectomy     8.  Health maintenance: Patient does not smoke.  Had a mammogram last year.  Had a colonoscopy around 2017.  Had a hysterectomy and does not need Pap smear     9.  Advance care planning: Patient has a living will on chart     10.  Pain assessment:  -Patient denies any pain today         Uziel Alonso MD  1/13/2020  6:38 PM        EMR Dragon/Transcription disclaimer:   Much of this encounter note is an electronic transcription/translation of spoken language to printed text. The electronic translation of spoken language may permit erroneous, or at times, nonsensical words or phrases to be inadvertently transcribed; Although I have reviewed the note for such errors, some may still exist.

## 2020-01-13 NOTE — PROGRESS NOTES
Ariana Luis Martinez  1960  60 y.o. female   PCP: Kimberly Ferguson 12/26/19  BS - 158 this morning per patient     Patient presents today for a post op follow up on the left foot. Physical therapy      01/13/2020        Chief Complaint   Patient presents with   • Left Foot - Post-op       History of Present Illness    Ms Martinez is a 60 y/o diabetic female who presents for follow-up of left subtalar joint arthrodesis, hardware removal and gastrocnemius recession.  She is doing well overall with no reported pain today.  She has been weightbearing as tolerated in her cam boot since last visit.  At last visit we did refer her to physical therapy however she states she is not yet started this because she was told her insurance will not cover it and each visit will be $200-$600.    Past Medical History:   Diagnosis Date   • Angina, class IV (CMS/HCC)    • Anxiety    • Anxiety and depression    • Benign paroxysmal positional vertigo    • Bladder disorder     has bladder stimulator   • Carpal tunnel syndrome    • Chronic pain    • Coronary atherosclerosis     hx CABG 2005.  is followed by Dr Kwon   • Depression    • Diabetes mellitus (CMS/HCC)     Type 2, controlled   • Diabetic polyneuropathy (CMS/HCC)    • Elevated cholesterol    • Female stress incontinence    • Foot pain, left    • Full dentures    • Gastroparesis    • GERD (gastroesophageal reflux disease)    • Hyperlipidemia    • Hypertension    • Low back pain    • Malaise and fatigue    • Multiple joint pain    • Obesity     Refuses to be weighed   • Otalgia     Both   • Perforation of tympanic membrane     Left   • Postoperative wound infection    • Vitamin D deficiency    • Wears glasses     reading         Past Surgical History:   Procedure Laterality Date   • ABDOMINAL SURGERY     • ANGIOPLASTY      coronary   • BREAST BIOPSY Right    • CARDIAC CATHETERIZATION     • CARDIAC CATHETERIZATION N/A 6/20/2017    Procedure: Right Heart Cath;  Surgeon: Can Quinones  MD Cher PhD;  Location: [facility] CATH INVASIVE LOCATION;  Service:    • CARPAL TUNNEL RELEASE     • CHOLECYSTECTOMY     • CORONARY ARTERY BYPASS GRAFT  2005   • ENDOSCOPY N/A 10/19/2018    Procedure: ESOPHAGOGASTRODUODENOSCOPY possible dilation;  Surgeon: Julián Maldonado MD;  Location: [facility] ENDOSCOPY;  Service: Gastroenterology   • ENDOSCOPY AND COLONOSCOPY     • FOOT SURGERY      Toes   • FOOT SURGERY     • GASTRIC BANDING      Revision, laparoscopic   • HYSTERECTOMY     • MOUTH SURGERY     • SALPINGO OOPHORECTOMY     • SHOULDER SURGERY     • SUBTALAR ARTHRODESIS Left 1/16/2019    Procedure: LEFT FOOT HARDWARE REMOVAL, FIFTH METATARSAL , OPEN REDUCTION INTERNAL FIXATION, CALCANEAL OSTEOTOMY;  Surgeon: Ignacio Lord DPM;  Location: [facility] OR;  Service: Podiatry   • SUBTALAR ARTHRODESIS Left 10/16/2019    Procedure: foot hardware removal, subtalar joint fusion  possible de/reattachment of achilles tendon        (c-arm);  Surgeon: Ignacio Lord DPM;  Location: [facility] OR;  Service: Podiatry   • TRANSESOPHAGEAL ECHOCARDIOGRAM (LAMONTE)      With color flow         Family History   Problem Relation Age of Onset   • Diabetes Other    • Heart disease Other    • Hypertension Other    • Heart disease Mother    • Stroke Mother    • Hypertension Mother    • Diabetes Sister    • Heart disease Sister    • Hypertension Sister    • Heart disease Sister    • Diabetes Sister    • Hypertension Sister    • Diabetes Sister    • Diabetes Sister    • Diabetes Sister    • Diabetes Sister          Social History     Socioeconomic History   • Marital status:      Spouse name: Not on file   • Number of children: Not on file   • Years of education: Not on file   • Highest education level: Not on file   Tobacco Use   • Smoking status: Never Smoker   • Smokeless tobacco: Never Used   Substance and Sexual Activity   • Alcohol use: No   • Drug use: No   • Sexual activity: Defer         Current Outpatient Medications    Medication Sig Dispense Refill   • ARIPiprazole (ABILIFY) 15 MG tablet Take 1 tablet by mouth Daily. 90 tablet 1   • aspirin 81 MG chewable tablet Chew 81 mg daily.     • atorvastatin (LIPITOR) 80 MG tablet Take 1 tablet by mouth Daily. 90 tablet 1   • BD SHARPS CONTAINER HOME misc 1 each Take As Directed. 1 each 0   • Blood Glucose Monitoring Suppl (ONE TOUCH ULTRA MINI) w/Device kit USE AS DIRECTED TO CHECK BLOOD SUGAR 1 each 0   • Calcium Citrate-Vitamin D (CITRACAL/VITAMIN D) 250-200 MG-UNIT tablet Take 2 tablets by mouth 2 (two) times a day.     • clopidogrel (PLAVIX) 75 MG tablet Take 75 mg by mouth Daily.     • cyanocobalamin 1000 MCG/ML injection INJECT 1 ML INTO THE APPROPRIATE MUSCLE AS DIRECTED BY PRESCRIBER EVERY 28 (TWENTY-EIGHT) DAYS. 1 mL 0   • fluticasone (FLONASE) 50 MCG/ACT nasal spray 1 spray into the nostril(s) as directed by provider Daily. 1 bottle 0   • FLUZONE QUADRIVALENT 0.5 ML suspension prefilled syringe injection      • furosemide (LASIX) 40 MG tablet Take 40 mg by mouth Daily.  3   • gabapentin (NEURONTIN) 400 MG capsule Take 1 capsule by mouth 3 (Three) Times a Day. 90 capsule 2   • GLUCAGON EMERGENCY 1 MG injection USE AS DIRECTED AS NEEDED 1 kit 0   • glucose blood (ONE TOUCH ULTRA TEST) test strip 1 each by Other route 4 (Four) Times a Day. Use as instructed 400 each 1   • hydroCHLOROthiazide (HYDRODIURIL) 12.5 MG tablet Take 12.5 mg by mouth Daily.     • HYDROcodone-acetaminophen (NORCO) 7.5-325 MG per tablet Take 1 tablet by mouth Every 4 (Four) Hours As Needed for Moderate Pain  or Severe Pain . 180 tablet 0   • insulin aspart (novoLOG FLEXPEN) 100 UNIT/ML solution pen-injector sc pen Inject 60 Units under the skin into the appropriate area as directed 3 (Three) Times a Day With Meals.     • Insulin Glargine (BASAGLAR KWIKPEN) 100 UNIT/ML injection pen Inject 100 Units under the skin into the appropriate area as directed Every Night. 6 pen 11   • Insulin Pen Needle (B-D  "ULTRAFINE III SHORT PEN) 31G X 8 MM misc USE TO INJECT 4 TIMES A  each 11   • LORazepam (ATIVAN) 0.5 MG tablet Take 1 tablet by mouth Daily As Needed for Anxiety. 30 tablet 0   • meclizine (ANTIVERT) 25 MG tablet Take 1 tablet by mouth 3 (Three) Times a Day As Needed for dizziness. 90 tablet 6   • metoclopramide (REGLAN) 10 MG tablet Take 10 mg by mouth 4 (Four) Times a Day As Needed.     • metoprolol succinate XL (TOPROL-XL) 25 MG 24 hr tablet TAKE 1 TABLET BY MOUTH DAILY. 31 tablet 6   • mirtazapine (REMERON) 45 MG tablet TAKE 1 TABLET BY MOUTH EVERY NIGHT. 90 tablet 0   • ONE TOUCH ULTRA TEST test strip USE TO TEST SUGAR 4 TIMES A  each 3   • potassium chloride (K-DUR,KLOR-CON) 10 MEQ CR tablet Take 1 tablet twice daily Fri, Sat , Sun and one Mon am. Return to lab for retest Monday am. Then as directed subseuquently 60 tablet 2   • promethazine-dextromethorphan (PROMETHAZINE-DM) 6.25-15 MG/5ML syrup Take 5 mL by mouth At Night As Needed for Cough. 120 mL 0   • RABEprazole (ACIPHEX) 20 MG EC tablet Take 1 tablet by mouth Daily. For acid reflux 30 tablet 5   • Syringe/Needle, Disp, (LUER LOCK SAFETY SYRINGES) 25G X 5/8\" 3 ML misc 1 each Daily. 100 each 0   • venlafaxine XR (EFFEXOR-XR) 150 MG 24 hr capsule TAKE 1 CAPSULE BY MOUTH TWICE A DAY 60 capsule 2   • vitamin D (ERGOCALCIFEROL) 1.25 MG (21286 UT) capsule capsule TAKE ONE CAPSULE BY MOUTH EVERY SUNDAY. 12 capsule 2     No current facility-administered medications for this visit.      Review of Systems   Constitutional: Negative.    HENT: Negative.    Eyes: Negative.    Respiratory: Negative.    Cardiovascular: Negative.    Gastrointestinal: Negative.    Endocrine: Negative.    Genitourinary: Negative.    Musculoskeletal: Negative.         Left foot pain    Skin: Negative.    Allergic/Immunologic: Negative.    Neurological: Positive for numbness.   Hematological: Negative.    Psychiatric/Behavioral: Negative.          OBJECTIVE    Pulse (!) 158  " " Ht 172.7 cm (68\")   Wt 112 kg (248 lb)   SpO2 98%   BMI 37.71 kg/m²         Physical Exam   Constitutional: she appears well-developed and well-nourished.   Psychiatric: she has a normal mood and affect. her   behavior is normal.      Lower Extremity Exam:  Neurovascular status intact  Cap Refill time brisk   Negative Conrad  Minimal edema to left ankle  Left foot and ankle incisions healed.  No erythema.  No fluctuance.    Subtalar joint rectus, rigid    Procedures        ASSESSMENT AND PLAN    Ariana was seen today for post-op.    Diagnoses and all orders for this visit:    Subtalar varus, acquired, left  -     XR Foot 3+ View Left        -Doing well postoperatively  -Radiographs ordered and reviewed  -Continue cam boot.  Begin transition to regular diabetic style shoes.  As patient does not have physical therapy coverage I did dispense a Thera-Band and HEP instructions.  Advised her to take it slow and advance towards regular shoes and increase activity as tolerated.  -Recheck 4 weeks          This document has been electronically signed by Елена Amaya MA on January 13, 2020 8:26 AM       "

## 2020-01-14 ENCOUNTER — TELEPHONE (OUTPATIENT)
Dept: ONCOLOGY | Facility: HOSPITAL | Age: 60
End: 2020-01-14

## 2020-01-14 NOTE — TELEPHONE ENCOUNTER
Called and spoke with pt regarding lab results and continuing b12 injections.  Pt states her sister gives the injections to her.

## 2020-01-14 NOTE — TELEPHONE ENCOUNTER
----- Message from Uziel Alonso MD sent at 1/14/2020  7:58 AM CST -----  Please let patient know, her iron level is adequate, no need to restart intravenous iron replacement.  She needs to continue with B12 injection.  Thank you

## 2020-01-29 ENCOUNTER — TELEPHONE (OUTPATIENT)
Dept: ENDOCRINOLOGY | Facility: CLINIC | Age: 60
End: 2020-01-29

## 2020-01-31 ENCOUNTER — TELEPHONE (OUTPATIENT)
Dept: ENDOCRINOLOGY | Facility: CLINIC | Age: 60
End: 2020-01-31

## 2020-02-03 RX ORDER — CLOPIDOGREL BISULFATE 75 MG/1
TABLET ORAL
Qty: 90 TABLET | Refills: 1 | Status: SHIPPED | OUTPATIENT
Start: 2020-02-03 | End: 2020-07-23

## 2020-02-04 DIAGNOSIS — G89.29 CHRONIC RIGHT-SIDED LOW BACK PAIN WITH RIGHT-SIDED SCIATICA: Chronic | ICD-10-CM

## 2020-02-04 DIAGNOSIS — M54.41 CHRONIC RIGHT-SIDED LOW BACK PAIN WITH RIGHT-SIDED SCIATICA: Chronic | ICD-10-CM

## 2020-02-05 RX ORDER — HYDROCODONE BITARTRATE AND ACETAMINOPHEN 7.5; 325 MG/1; MG/1
1 TABLET ORAL EVERY 4 HOURS PRN
Qty: 180 TABLET | Refills: 0 | Status: SHIPPED | OUTPATIENT
Start: 2020-02-05 | End: 2020-03-03 | Stop reason: SDUPTHER

## 2020-02-10 ENCOUNTER — OFFICE VISIT (OUTPATIENT)
Dept: PODIATRY | Facility: CLINIC | Age: 60
End: 2020-02-10

## 2020-02-10 VITALS — HEIGHT: 68 IN | HEART RATE: 110 BPM | WEIGHT: 250 LBS | OXYGEN SATURATION: 98 % | BODY MASS INDEX: 37.89 KG/M2

## 2020-02-10 DIAGNOSIS — F41.1 GENERALIZED ANXIETY DISORDER: ICD-10-CM

## 2020-02-10 DIAGNOSIS — M21.172 SUBTALAR VARUS, ACQUIRED, LEFT: Primary | ICD-10-CM

## 2020-02-10 DIAGNOSIS — R26.81 GAIT INSTABILITY: ICD-10-CM

## 2020-02-10 DIAGNOSIS — Q66.70 PES CAVUS: ICD-10-CM

## 2020-02-10 PROCEDURE — 99213 OFFICE O/P EST LOW 20 MIN: CPT | Performed by: PODIATRIST

## 2020-02-10 NOTE — PROGRESS NOTES
Ariana Luis Martinez  1960  60 y.o. female   PCP: Kimberly Ferguson 12/26/19  BS - 168 this morning per patient     Patient presents today for a post op follow up on the left foot.     02/10/2020      Chief Complaint   Patient presents with   • Left Foot - Follow-up       History of Present Illness    Ms Martinez is a 59 y/o diabetic female who presents for follow-up of left subtalar joint arthrodesis, hardware removal and gastrocnemius recession.  She is doing well overall.  Has transitioned back to regular shoes.  Does note small increase in pain to the bottom of her foot with weightbearing.  She has been performing HEP and notes an improvement in her balance.    Past Medical History:   Diagnosis Date   • Angina, class IV (CMS/HCC)    • Anxiety    • Anxiety and depression    • Benign paroxysmal positional vertigo    • Bladder disorder     has bladder stimulator   • Carpal tunnel syndrome    • Chronic pain    • Coronary atherosclerosis     hx CABG 2005.  is followed by Dr Kwon   • Depression    • Diabetes mellitus (CMS/HCC)     Type 2, controlled   • Diabetic polyneuropathy (CMS/HCC)    • Elevated cholesterol    • Female stress incontinence    • Foot pain, left    • Full dentures    • Gastroparesis    • GERD (gastroesophageal reflux disease)    • Hyperlipidemia    • Hypertension    • Low back pain    • Malaise and fatigue    • Multiple joint pain    • Obesity     Refuses to be weighed   • Otalgia     Both   • Perforation of tympanic membrane     Left   • Postoperative wound infection    • Vitamin D deficiency    • Wears glasses     reading         Past Surgical History:   Procedure Laterality Date   • ABDOMINAL SURGERY     • ANGIOPLASTY      coronary   • BREAST BIOPSY Right    • CARDIAC CATHETERIZATION     • CARDIAC CATHETERIZATION N/A 6/20/2017    Procedure: Right Heart Cath;  Surgeon: Can Kwon MD PhD;  Location: [facility] INVASIVE LOCATION;  Service:    • CARPAL TUNNEL RELEASE     • CHOLECYSTECTOMY      • CORONARY ARTERY BYPASS GRAFT  2005   • ENDOSCOPY N/A 10/19/2018    Procedure: ESOPHAGOGASTRODUODENOSCOPY possible dilation;  Surgeon: Julián Maldonado MD;  Location: [facility] ENDOSCOPY;  Service: Gastroenterology   • ENDOSCOPY AND COLONOSCOPY     • FOOT SURGERY      Toes   • FOOT SURGERY     • GASTRIC BANDING      Revision, laparoscopic   • HYSTERECTOMY     • MOUTH SURGERY     • SALPINGO OOPHORECTOMY     • SHOULDER SURGERY     • SUBTALAR ARTHRODESIS Left 1/16/2019    Procedure: LEFT FOOT HARDWARE REMOVAL, FIFTH METATARSAL , OPEN REDUCTION INTERNAL FIXATION, CALCANEAL OSTEOTOMY;  Surgeon: Ignacio Lord DPM;  Location: [facility] OR;  Service: Podiatry   • SUBTALAR ARTHRODESIS Left 10/16/2019    Procedure: foot hardware removal, subtalar joint fusion  possible de/reattachment of achilles tendon        (c-arm);  Surgeon: Ignacio Lord DPM;  Location: [facility] OR;  Service: Podiatry   • TRANSESOPHAGEAL ECHOCARDIOGRAM (LAMONTE)      With color flow         Family History   Problem Relation Age of Onset   • Diabetes Other    • Heart disease Other    • Hypertension Other    • Heart disease Mother    • Stroke Mother    • Hypertension Mother    • Diabetes Sister    • Heart disease Sister    • Hypertension Sister    • Heart disease Sister    • Diabetes Sister    • Hypertension Sister    • Diabetes Sister    • Diabetes Sister    • Diabetes Sister    • Diabetes Sister          Social History     Socioeconomic History   • Marital status:      Spouse name: Not on file   • Number of children: Not on file   • Years of education: Not on file   • Highest education level: Not on file   Tobacco Use   • Smoking status: Never Smoker   • Smokeless tobacco: Never Used   Substance and Sexual Activity   • Alcohol use: No   • Drug use: No   • Sexual activity: Defer         Current Outpatient Medications   Medication Sig Dispense Refill   • ARIPiprazole (ABILIFY) 15 MG tablet Take 1 tablet by mouth Daily. 90 tablet 1   • aspirin  81 MG chewable tablet Chew 81 mg daily.     • atorvastatin (LIPITOR) 80 MG tablet Take 1 tablet by mouth Daily. 90 tablet 1   • BD SHARPS CONTAINER HOME misc 1 each Take As Directed. 1 each 0   • Blood Glucose Monitoring Suppl (ONE TOUCH ULTRA MINI) w/Device kit USE AS DIRECTED TO CHECK BLOOD SUGAR 1 each 0   • Calcium Citrate-Vitamin D (CITRACAL/VITAMIN D) 250-200 MG-UNIT tablet Take 2 tablets by mouth 2 (two) times a day.     • clopidogrel (PLAVIX) 75 MG tablet TAKE 1 TABLET BY MOUTH DAILY. 90 tablet 1   • cyanocobalamin 1000 MCG/ML injection INJECT 1 ML INTO THE APPROPRIATE MUSCLE AS DIRECTED BY PRESCRIBER EVERY 28 (TWENTY-EIGHT) DAYS. 1 mL 0   • fluticasone (FLONASE) 50 MCG/ACT nasal spray 1 spray into the nostril(s) as directed by provider Daily. 1 bottle 0   • FLUZONE QUADRIVALENT 0.5 ML suspension prefilled syringe injection      • furosemide (LASIX) 40 MG tablet Take 40 mg by mouth Daily.  3   • gabapentin (NEURONTIN) 400 MG capsule Take 1 capsule by mouth 3 (Three) Times a Day. 90 capsule 2   • GLUCAGON EMERGENCY 1 MG injection USE AS DIRECTED AS NEEDED 1 kit 0   • glucose blood test strip Use to check blood sugar 4 times daily. accucheck 125 each 12   • hydroCHLOROthiazide (HYDRODIURIL) 12.5 MG tablet Take 12.5 mg by mouth Daily.     • HYDROcodone-acetaminophen (NORCO) 7.5-325 MG per tablet Take 1 tablet by mouth Every 4 (Four) Hours As Needed for Moderate Pain  or Severe Pain . 180 tablet 0   • insulin aspart (novoLOG FLEXPEN) 100 UNIT/ML solution pen-injector sc pen Inject 60 Units under the skin into the appropriate area as directed 3 (Three) Times a Day With Meals.     • Insulin Glargine (BASAGLAR KWIKPEN) 100 UNIT/ML injection pen Inject 100 Units under the skin into the appropriate area as directed Every Night. 6 pen 11   • Insulin Pen Needle (B-D ULTRAFINE III SHORT PEN) 31G X 8 MM misc USE TO INJECT 4 TIMES A  each 11   • LORazepam (ATIVAN) 0.5 MG tablet Take 1 tablet by mouth Daily As  "Needed for Anxiety. 30 tablet 0   • meclizine (ANTIVERT) 25 MG tablet Take 1 tablet by mouth 3 (Three) Times a Day As Needed for dizziness. 90 tablet 6   • metoclopramide (REGLAN) 10 MG tablet Take 10 mg by mouth 4 (Four) Times a Day As Needed.     • metoprolol succinate XL (TOPROL-XL) 25 MG 24 hr tablet TAKE 1 TABLET BY MOUTH DAILY. 31 tablet 6   • mirtazapine (REMERON) 45 MG tablet TAKE 1 TABLET BY MOUTH EVERY NIGHT. 90 tablet 0   • potassium chloride (K-DUR,KLOR-CON) 10 MEQ CR tablet Take 1 tablet twice daily Fri, Sat , Sun and one Mon am. Return to lab for retest Monday am. Then as directed subseuquently 60 tablet 2   • promethazine-dextromethorphan (PROMETHAZINE-DM) 6.25-15 MG/5ML syrup Take 5 mL by mouth At Night As Needed for Cough. 120 mL 0   • RABEprazole (ACIPHEX) 20 MG EC tablet Take 1 tablet by mouth Daily. For acid reflux 30 tablet 5   • Syringe/Needle, Disp, (LUER LOCK SAFETY SYRINGES) 25G X 5/8\" 3 ML misc 1 each Daily. 100 each 0   • venlafaxine XR (EFFEXOR-XR) 150 MG 24 hr capsule TAKE 1 CAPSULE BY MOUTH TWICE A DAY 60 capsule 2   • vitamin D (ERGOCALCIFEROL) 1.25 MG (94556 UT) capsule capsule TAKE ONE CAPSULE BY MOUTH EVERY SUNDAY. 12 capsule 2     No current facility-administered medications for this visit.      Review of Systems   Constitutional: Negative.    HENT: Negative.    Eyes: Negative.    Respiratory: Negative.    Cardiovascular: Negative.    Gastrointestinal: Negative.    Endocrine: Negative.    Genitourinary: Negative.    Musculoskeletal: Negative.         Left foot pain    Skin: Negative.    Allergic/Immunologic: Negative.    Neurological: Positive for numbness.   Hematological: Negative.    Psychiatric/Behavioral: Negative.          OBJECTIVE    Pulse 110   Ht 172.7 cm (67.99\")   Wt 113 kg (250 lb)   SpO2 98%   BMI 38.02 kg/m²         Physical Exam   Constitutional: she appears well-developed and well-nourished.   Psychiatric: she has a normal mood and affect. her   behavior is " normal.      Lower Extremity Exam:  Neurovascular status intact  Cap Refill time brisk   Negative Conrad  Minimal edema to left ankle  Left foot and ankle incisions healed.  No erythema.  No fluctuance.    Subtalar joint rectus, rigid.  Residual pes cavus with mild tenderness palpation of plantar cuboid noted.    Procedures        ASSESSMENT AND PLAN    Ariana was seen today for follow-up.    Diagnoses and all orders for this visit:    Subtalar varus, acquired, left  -     XR Foot 3+ View Left    Pes cavus    Gait instability        -Doing well postoperatively  -Radiographs ordered and reviewed  -Continue diabetic shoe gear, HEP for strengthening and gait training.  -Modification of custom insoles to further offload lateral column today.  Consider custom bracing in future if plantar foot tenderness continues.  -Recheck 4 weeks          This document has been electronically signed by Ignacio Lord DPM on February 10, 2020 9:48 AM

## 2020-02-11 RX ORDER — LORAZEPAM 0.5 MG/1
TABLET ORAL
Qty: 30 TABLET | Refills: 0 | Status: SHIPPED | OUTPATIENT
Start: 2020-02-11 | End: 2020-03-12 | Stop reason: SDUPTHER

## 2020-02-16 ENCOUNTER — APPOINTMENT (OUTPATIENT)
Dept: GENERAL RADIOLOGY | Facility: HOSPITAL | Age: 60
End: 2020-02-16

## 2020-02-16 ENCOUNTER — HOSPITAL ENCOUNTER (OUTPATIENT)
Facility: HOSPITAL | Age: 60
Discharge: HOME | End: 2020-02-19
Attending: EMERGENCY MEDICINE | Admitting: INTERNAL MEDICINE

## 2020-02-16 DIAGNOSIS — I25.9 CHEST PAIN DUE TO MYOCARDIAL ISCHEMIA, UNSPECIFIED ISCHEMIC CHEST PAIN TYPE: ICD-10-CM

## 2020-02-16 DIAGNOSIS — R07.9 CHEST PAIN, UNSPECIFIED TYPE: Primary | ICD-10-CM

## 2020-02-16 LAB
ALBUMIN SERPL-MCNC: 4.1 G/DL (ref 3.5–5.2)
ALBUMIN/GLOB SERPL: 1.2 G/DL
ALP SERPL-CCNC: 161 U/L (ref 39–117)
ALT SERPL W P-5'-P-CCNC: 16 U/L (ref 1–33)
ANION GAP SERPL CALCULATED.3IONS-SCNC: 15 MMOL/L (ref 5–15)
AST SERPL-CCNC: 13 U/L (ref 1–32)
BASOPHILS # BLD AUTO: 0.06 10*3/MM3 (ref 0–0.2)
BASOPHILS NFR BLD AUTO: 0.5 % (ref 0–1.5)
BILIRUB SERPL-MCNC: 0.2 MG/DL (ref 0.2–1.2)
BUN BLD-MCNC: 20 MG/DL (ref 6–20)
BUN/CREAT SERPL: 18.9 (ref 7–25)
CALCIUM SPEC-SCNC: 9.8 MG/DL (ref 8.6–10.5)
CHLORIDE SERPL-SCNC: 96 MMOL/L (ref 98–107)
CO2 SERPL-SCNC: 28 MMOL/L (ref 22–29)
CREAT BLD-MCNC: 1.06 MG/DL (ref 0.57–1)
DEPRECATED RDW RBC AUTO: 42 FL (ref 37–54)
EOSINOPHIL # BLD AUTO: 0.34 10*3/MM3 (ref 0–0.4)
EOSINOPHIL NFR BLD AUTO: 2.8 % (ref 0.3–6.2)
ERYTHROCYTE [DISTWIDTH] IN BLOOD BY AUTOMATED COUNT: 13 % (ref 12.3–15.4)
GFR SERPL CREATININE-BSD FRML MDRD: 53 ML/MIN/1.73
GLOBULIN UR ELPH-MCNC: 3.4 GM/DL
GLUCOSE BLD-MCNC: 155 MG/DL (ref 65–99)
GLUCOSE BLDC GLUCOMTR-MCNC: 154 MG/DL (ref 70–130)
GLUCOSE BLDC GLUCOMTR-MCNC: 159 MG/DL (ref 70–130)
GLUCOSE BLDC GLUCOMTR-MCNC: 249 MG/DL (ref 70–130)
HCT VFR BLD AUTO: 40.6 % (ref 34–46.6)
HGB BLD-MCNC: 13.9 G/DL (ref 12–15.9)
HOLD SPECIMEN: NORMAL
IMM GRANULOCYTES # BLD AUTO: 0.05 10*3/MM3 (ref 0–0.05)
IMM GRANULOCYTES NFR BLD AUTO: 0.4 % (ref 0–0.5)
LIPASE SERPL-CCNC: 6 U/L (ref 13–60)
LYMPHOCYTES # BLD AUTO: 2.8 10*3/MM3 (ref 0.7–3.1)
LYMPHOCYTES NFR BLD AUTO: 23.2 % (ref 19.6–45.3)
MCH RBC QN AUTO: 30 PG (ref 26.6–33)
MCHC RBC AUTO-ENTMCNC: 34.2 G/DL (ref 31.5–35.7)
MCV RBC AUTO: 87.7 FL (ref 79–97)
MONOCYTES # BLD AUTO: 1.02 10*3/MM3 (ref 0.1–0.9)
MONOCYTES NFR BLD AUTO: 8.5 % (ref 5–12)
NEUTROPHILS # BLD AUTO: 7.78 10*3/MM3 (ref 1.7–7)
NEUTROPHILS NFR BLD AUTO: 64.6 % (ref 42.7–76)
NRBC BLD AUTO-RTO: 0 /100 WBC (ref 0–0.2)
NT-PROBNP SERPL-MCNC: 53.9 PG/ML (ref 5–900)
PLATELET # BLD AUTO: 436 10*3/MM3 (ref 140–450)
PMV BLD AUTO: 9.4 FL (ref 6–12)
POTASSIUM BLD-SCNC: 3.4 MMOL/L (ref 3.5–5.2)
PROT SERPL-MCNC: 7.5 G/DL (ref 6–8.5)
RBC # BLD AUTO: 4.63 10*6/MM3 (ref 3.77–5.28)
SODIUM BLD-SCNC: 139 MMOL/L (ref 136–145)
TROPONIN T SERPL-MCNC: <0.01 NG/ML (ref 0–0.03)
WBC NRBC COR # BLD: 12.05 10*3/MM3 (ref 3.4–10.8)
WHOLE BLOOD HOLD SPECIMEN: NORMAL
WHOLE BLOOD HOLD SPECIMEN: NORMAL

## 2020-02-16 PROCEDURE — 99285 EMERGENCY DEPT VISIT HI MDM: CPT

## 2020-02-16 PROCEDURE — 63710000001 INSULIN ASPART PER 5 UNITS: Performed by: NURSE PRACTITIONER

## 2020-02-16 PROCEDURE — 25010000002 ONDANSETRON PER 1 MG: Performed by: EMERGENCY MEDICINE

## 2020-02-16 PROCEDURE — 83690 ASSAY OF LIPASE: CPT | Performed by: EMERGENCY MEDICINE

## 2020-02-16 PROCEDURE — 85025 COMPLETE CBC W/AUTO DIFF WBC: CPT | Performed by: EMERGENCY MEDICINE

## 2020-02-16 PROCEDURE — 84484 ASSAY OF TROPONIN QUANT: CPT | Performed by: EMERGENCY MEDICINE

## 2020-02-16 PROCEDURE — 96374 THER/PROPH/DIAG INJ IV PUSH: CPT

## 2020-02-16 PROCEDURE — 71045 X-RAY EXAM CHEST 1 VIEW: CPT

## 2020-02-16 PROCEDURE — G0378 HOSPITAL OBSERVATION PER HR: HCPCS

## 2020-02-16 PROCEDURE — 82962 GLUCOSE BLOOD TEST: CPT

## 2020-02-16 PROCEDURE — 93010 ELECTROCARDIOGRAM REPORT: CPT | Performed by: INTERNAL MEDICINE

## 2020-02-16 PROCEDURE — 25010000002 ONDANSETRON PER 1 MG: Performed by: NURSE PRACTITIONER

## 2020-02-16 PROCEDURE — 96375 TX/PRO/DX INJ NEW DRUG ADDON: CPT

## 2020-02-16 PROCEDURE — 25010000002 HYDROMORPHONE 1 MG/ML SOLUTION: Performed by: EMERGENCY MEDICINE

## 2020-02-16 PROCEDURE — 80053 COMPREHEN METABOLIC PANEL: CPT | Performed by: EMERGENCY MEDICINE

## 2020-02-16 PROCEDURE — 93005 ELECTROCARDIOGRAM TRACING: CPT

## 2020-02-16 PROCEDURE — 84484 ASSAY OF TROPONIN QUANT: CPT | Performed by: NURSE PRACTITIONER

## 2020-02-16 PROCEDURE — 83880 ASSAY OF NATRIURETIC PEPTIDE: CPT | Performed by: EMERGENCY MEDICINE

## 2020-02-16 PROCEDURE — 36415 COLL VENOUS BLD VENIPUNCTURE: CPT | Performed by: EMERGENCY MEDICINE

## 2020-02-16 PROCEDURE — 93005 ELECTROCARDIOGRAM TRACING: CPT | Performed by: EMERGENCY MEDICINE

## 2020-02-16 PROCEDURE — 96376 TX/PRO/DX INJ SAME DRUG ADON: CPT

## 2020-02-16 PROCEDURE — 93005 ELECTROCARDIOGRAM TRACING: CPT | Performed by: NURSE PRACTITIONER

## 2020-02-16 RX ORDER — ONDANSETRON HYDROCHLORIDE 8 MG/1
TABLET, FILM COATED ORAL EVERY 8 HOURS PRN
COMMUNITY
End: 2020-04-02 | Stop reason: SDUPTHER

## 2020-02-16 RX ORDER — CYANOCOBALAMIN 1000 UG/ML
1000 INJECTION, SOLUTION INTRAMUSCULAR; SUBCUTANEOUS
Status: DISCONTINUED | OUTPATIENT
Start: 2020-02-16 | End: 2020-02-16

## 2020-02-16 RX ORDER — CLOPIDOGREL BISULFATE 75 MG/1
75 TABLET ORAL DAILY
Status: DISCONTINUED | OUTPATIENT
Start: 2020-02-16 | End: 2020-02-19 | Stop reason: HOSPADM

## 2020-02-16 RX ORDER — DEXTROSE MONOHYDRATE 25 G/50ML
25 INJECTION, SOLUTION INTRAVENOUS
Status: DISCONTINUED | OUTPATIENT
Start: 2020-02-16 | End: 2020-02-19 | Stop reason: HOSPADM

## 2020-02-16 RX ORDER — GABAPENTIN 400 MG/1
400 CAPSULE ORAL 3 TIMES DAILY
Status: DISCONTINUED | OUTPATIENT
Start: 2020-02-16 | End: 2020-02-19 | Stop reason: HOSPADM

## 2020-02-16 RX ORDER — POTASSIUM CHLORIDE 750 MG/1
40 CAPSULE, EXTENDED RELEASE ORAL ONCE
Status: COMPLETED | OUTPATIENT
Start: 2020-02-16 | End: 2020-02-16

## 2020-02-16 RX ORDER — ASPIRIN 81 MG/1
81 TABLET, CHEWABLE ORAL DAILY
Status: DISCONTINUED | OUTPATIENT
Start: 2020-02-17 | End: 2020-02-18 | Stop reason: SDUPTHER

## 2020-02-16 RX ORDER — HYDROCHLOROTHIAZIDE 12.5 MG/1
12.5 CAPSULE, GELATIN COATED ORAL DAILY
Status: DISCONTINUED | OUTPATIENT
Start: 2020-02-16 | End: 2020-02-19 | Stop reason: HOSPADM

## 2020-02-16 RX ORDER — SODIUM CHLORIDE 0.9 % (FLUSH) 0.9 %
10 SYRINGE (ML) INJECTION AS NEEDED
Status: DISCONTINUED | OUTPATIENT
Start: 2020-02-16 | End: 2020-02-19 | Stop reason: HOSPADM

## 2020-02-16 RX ORDER — NICOTINE POLACRILEX 4 MG
15 LOZENGE BUCCAL
Status: DISCONTINUED | OUTPATIENT
Start: 2020-02-16 | End: 2020-02-19 | Stop reason: HOSPADM

## 2020-02-16 RX ORDER — PANTOPRAZOLE SODIUM 20 MG/1
20 TABLET, DELAYED RELEASE ORAL DAILY
COMMUNITY
End: 2020-03-16 | Stop reason: SDUPTHER

## 2020-02-16 RX ORDER — FLUTICASONE PROPIONATE 50 MCG
1 SPRAY, SUSPENSION (ML) NASAL DAILY
Status: DISCONTINUED | OUTPATIENT
Start: 2020-02-17 | End: 2020-02-19 | Stop reason: HOSPADM

## 2020-02-16 RX ORDER — ONDANSETRON 2 MG/ML
INJECTION INTRAMUSCULAR; INTRAVENOUS
Status: DISPENSED
Start: 2020-02-16 | End: 2020-02-17

## 2020-02-16 RX ORDER — ATORVASTATIN CALCIUM 40 MG/1
80 TABLET, FILM COATED ORAL NIGHTLY
Status: DISCONTINUED | OUTPATIENT
Start: 2020-02-16 | End: 2020-02-19 | Stop reason: HOSPADM

## 2020-02-16 RX ORDER — FUROSEMIDE 40 MG/1
40 TABLET ORAL DAILY
Status: DISCONTINUED | OUTPATIENT
Start: 2020-02-16 | End: 2020-02-19 | Stop reason: HOSPADM

## 2020-02-16 RX ORDER — HYDROCODONE BITARTRATE AND ACETAMINOPHEN 7.5; 325 MG/1; MG/1
1 TABLET ORAL EVERY 4 HOURS PRN
Status: DISCONTINUED | OUTPATIENT
Start: 2020-02-16 | End: 2020-02-18

## 2020-02-16 RX ORDER — SODIUM CHLORIDE 0.9 % (FLUSH) 0.9 %
10 SYRINGE (ML) INJECTION EVERY 12 HOURS SCHEDULED
Status: DISCONTINUED | OUTPATIENT
Start: 2020-02-16 | End: 2020-02-19 | Stop reason: HOSPADM

## 2020-02-16 RX ORDER — VENLAFAXINE HYDROCHLORIDE 75 MG/1
150 CAPSULE, EXTENDED RELEASE ORAL 2 TIMES DAILY
Status: DISCONTINUED | OUTPATIENT
Start: 2020-02-16 | End: 2020-02-19 | Stop reason: HOSPADM

## 2020-02-16 RX ORDER — ONDANSETRON 2 MG/ML
4 INJECTION INTRAMUSCULAR; INTRAVENOUS ONCE
Status: COMPLETED | OUTPATIENT
Start: 2020-02-16 | End: 2020-02-16

## 2020-02-16 RX ORDER — ASPIRIN 81 MG/1
324 TABLET, CHEWABLE ORAL ONCE
Status: COMPLETED | OUTPATIENT
Start: 2020-02-16 | End: 2020-02-16

## 2020-02-16 RX ORDER — ONDANSETRON 2 MG/ML
4 INJECTION INTRAMUSCULAR; INTRAVENOUS EVERY 8 HOURS PRN
Status: DISCONTINUED | OUTPATIENT
Start: 2020-02-16 | End: 2020-02-19 | Stop reason: HOSPADM

## 2020-02-16 RX ORDER — LORAZEPAM 0.5 MG/1
0.5 TABLET ORAL DAILY PRN
Status: DISCONTINUED | OUTPATIENT
Start: 2020-02-16 | End: 2020-02-19 | Stop reason: HOSPADM

## 2020-02-16 RX ORDER — METOPROLOL SUCCINATE 25 MG/1
25 TABLET, EXTENDED RELEASE ORAL DAILY
Status: DISCONTINUED | OUTPATIENT
Start: 2020-02-16 | End: 2020-02-19 | Stop reason: HOSPADM

## 2020-02-16 RX ORDER — MIRTAZAPINE 15 MG/1
45 TABLET, FILM COATED ORAL NIGHTLY
Status: DISCONTINUED | OUTPATIENT
Start: 2020-02-16 | End: 2020-02-19 | Stop reason: HOSPADM

## 2020-02-16 RX ORDER — ONDANSETRON 2 MG/ML
4 INJECTION INTRAMUSCULAR; INTRAVENOUS EVERY 6 HOURS PRN
Status: DISCONTINUED | OUTPATIENT
Start: 2020-02-16 | End: 2020-02-16

## 2020-02-16 RX ORDER — CYANOCOBALAMIN 1000 UG/ML
1000 INJECTION, SOLUTION INTRAMUSCULAR; SUBCUTANEOUS
Status: DISCONTINUED | OUTPATIENT
Start: 2020-03-12 | End: 2020-02-19 | Stop reason: HOSPADM

## 2020-02-16 RX ORDER — METOCLOPRAMIDE 10 MG/1
10 TABLET ORAL 4 TIMES DAILY PRN
Status: DISCONTINUED | OUTPATIENT
Start: 2020-02-16 | End: 2020-02-19 | Stop reason: HOSPADM

## 2020-02-16 RX ORDER — ARIPIPRAZOLE 15 MG/1
15 TABLET ORAL DAILY
Status: DISCONTINUED | OUTPATIENT
Start: 2020-02-16 | End: 2020-02-19 | Stop reason: HOSPADM

## 2020-02-16 RX ADMIN — MIRTAZAPINE 45 MG: 15 TABLET, FILM COATED ORAL at 21:16

## 2020-02-16 RX ADMIN — HYDROMORPHONE HYDROCHLORIDE 0.5 MG: 1 INJECTION, SOLUTION INTRAMUSCULAR; INTRAVENOUS; SUBCUTANEOUS at 10:43

## 2020-02-16 RX ADMIN — ONDANSETRON HYDROCHLORIDE 4 MG: 2 INJECTION, SOLUTION INTRAMUSCULAR; INTRAVENOUS at 10:05

## 2020-02-16 RX ADMIN — NITROGLYCERIN 1 INCH: 20 OINTMENT TOPICAL at 09:58

## 2020-02-16 RX ADMIN — METOCLOPRAMIDE HYDROCHLORIDE 10 MG: 10 TABLET ORAL at 13:38

## 2020-02-16 RX ADMIN — GABAPENTIN 400 MG: 400 CAPSULE ORAL at 17:36

## 2020-02-16 RX ADMIN — INSULIN ASPART 5 UNITS: 100 INJECTION, SOLUTION INTRAVENOUS; SUBCUTANEOUS at 21:17

## 2020-02-16 RX ADMIN — GABAPENTIN 400 MG: 400 CAPSULE ORAL at 21:16

## 2020-02-16 RX ADMIN — ONDANSETRON 4 MG: 2 SOLUTION INTRAMUSCULAR; INTRAVENOUS at 16:48

## 2020-02-16 RX ADMIN — ASPIRIN 81 MG 324 MG: 81 TABLET ORAL at 09:30

## 2020-02-16 RX ADMIN — INSULIN ASPART 3 UNITS: 100 INJECTION, SOLUTION INTRAVENOUS; SUBCUTANEOUS at 14:27

## 2020-02-16 RX ADMIN — POTASSIUM CHLORIDE 40 MEQ: 750 CAPSULE, EXTENDED RELEASE ORAL at 21:16

## 2020-02-16 RX ADMIN — VENLAFAXINE HYDROCHLORIDE 150 MG: 75 CAPSULE, EXTENDED RELEASE ORAL at 21:16

## 2020-02-16 RX ADMIN — SODIUM CHLORIDE, PRESERVATIVE FREE 10 ML: 5 INJECTION INTRAVENOUS at 13:10

## 2020-02-16 RX ADMIN — INSULIN ASPART 3 UNITS: 100 INJECTION, SOLUTION INTRAVENOUS; SUBCUTANEOUS at 17:36

## 2020-02-16 RX ADMIN — SODIUM CHLORIDE, PRESERVATIVE FREE 10 ML: 5 INJECTION INTRAVENOUS at 22:14

## 2020-02-16 RX ADMIN — HYDROCODONE BITARTRATE AND ACETAMINOPHEN 1 TABLET: 7.5; 325 TABLET ORAL at 14:27

## 2020-02-16 RX ADMIN — SODIUM CHLORIDE, PRESERVATIVE FREE 10 ML: 5 INJECTION INTRAVENOUS at 21:31

## 2020-02-16 RX ADMIN — ATORVASTATIN CALCIUM 80 MG: 40 TABLET, FILM COATED ORAL at 21:16

## 2020-02-17 ENCOUNTER — APPOINTMENT (OUTPATIENT)
Dept: NUCLEAR MEDICINE | Facility: HOSPITAL | Age: 60
End: 2020-02-17

## 2020-02-17 LAB
ANION GAP SERPL CALCULATED.3IONS-SCNC: 12 MMOL/L (ref 5–15)
BASOPHILS # BLD AUTO: 0.05 10*3/MM3 (ref 0–0.2)
BASOPHILS NFR BLD AUTO: 0.5 % (ref 0–1.5)
BUN BLD-MCNC: 18 MG/DL (ref 6–20)
BUN/CREAT SERPL: 18.9 (ref 7–25)
CALCIUM SPEC-SCNC: 9.5 MG/DL (ref 8.6–10.5)
CHLORIDE SERPL-SCNC: 99 MMOL/L (ref 98–107)
CO2 SERPL-SCNC: 30 MMOL/L (ref 22–29)
CREAT BLD-MCNC: 0.95 MG/DL (ref 0.57–1)
DEPRECATED RDW RBC AUTO: 42.6 FL (ref 37–54)
EOSINOPHIL # BLD AUTO: 0.34 10*3/MM3 (ref 0–0.4)
EOSINOPHIL NFR BLD AUTO: 3.4 % (ref 0.3–6.2)
ERYTHROCYTE [DISTWIDTH] IN BLOOD BY AUTOMATED COUNT: 13.1 % (ref 12.3–15.4)
GFR SERPL CREATININE-BSD FRML MDRD: 60 ML/MIN/1.73
GLUCOSE BLD-MCNC: 152 MG/DL (ref 65–99)
GLUCOSE BLDC GLUCOMTR-MCNC: 157 MG/DL (ref 70–130)
GLUCOSE BLDC GLUCOMTR-MCNC: 276 MG/DL (ref 70–130)
GLUCOSE BLDC GLUCOMTR-MCNC: 282 MG/DL (ref 70–130)
GLUCOSE BLDC GLUCOMTR-MCNC: 285 MG/DL (ref 70–130)
HCT VFR BLD AUTO: 40.2 % (ref 34–46.6)
HGB BLD-MCNC: 13.4 G/DL (ref 12–15.9)
IMM GRANULOCYTES # BLD AUTO: 0.06 10*3/MM3 (ref 0–0.05)
IMM GRANULOCYTES NFR BLD AUTO: 0.6 % (ref 0–0.5)
LYMPHOCYTES # BLD AUTO: 3.25 10*3/MM3 (ref 0.7–3.1)
LYMPHOCYTES NFR BLD AUTO: 32.7 % (ref 19.6–45.3)
MCH RBC QN AUTO: 29.9 PG (ref 26.6–33)
MCHC RBC AUTO-ENTMCNC: 33.3 G/DL (ref 31.5–35.7)
MCV RBC AUTO: 89.7 FL (ref 79–97)
MONOCYTES # BLD AUTO: 0.73 10*3/MM3 (ref 0.1–0.9)
MONOCYTES NFR BLD AUTO: 7.4 % (ref 5–12)
NEUTROPHILS # BLD AUTO: 5.5 10*3/MM3 (ref 1.7–7)
NEUTROPHILS NFR BLD AUTO: 55.4 % (ref 42.7–76)
NRBC BLD AUTO-RTO: 0 /100 WBC (ref 0–0.2)
PLATELET # BLD AUTO: 430 10*3/MM3 (ref 140–450)
PMV BLD AUTO: 9.5 FL (ref 6–12)
POTASSIUM BLD-SCNC: 3.7 MMOL/L (ref 3.5–5.2)
RBC # BLD AUTO: 4.48 10*6/MM3 (ref 3.77–5.28)
SODIUM BLD-SCNC: 141 MMOL/L (ref 136–145)
WBC NRBC COR # BLD: 9.93 10*3/MM3 (ref 3.4–10.8)

## 2020-02-17 PROCEDURE — 93005 ELECTROCARDIOGRAM TRACING: CPT | Performed by: NURSE PRACTITIONER

## 2020-02-17 PROCEDURE — 80048 BASIC METABOLIC PNL TOTAL CA: CPT | Performed by: NURSE PRACTITIONER

## 2020-02-17 PROCEDURE — 85025 COMPLETE CBC W/AUTO DIFF WBC: CPT | Performed by: NURSE PRACTITIONER

## 2020-02-17 PROCEDURE — A9500 TC99M SESTAMIBI: HCPCS | Performed by: INTERNAL MEDICINE

## 2020-02-17 PROCEDURE — 25010000002 ONDANSETRON PER 1 MG: Performed by: NURSE PRACTITIONER

## 2020-02-17 PROCEDURE — G0378 HOSPITAL OBSERVATION PER HR: HCPCS

## 2020-02-17 PROCEDURE — 78452 HT MUSCLE IMAGE SPECT MULT: CPT

## 2020-02-17 PROCEDURE — 78452 HT MUSCLE IMAGE SPECT MULT: CPT | Performed by: INTERNAL MEDICINE

## 2020-02-17 PROCEDURE — 93010 ELECTROCARDIOGRAM REPORT: CPT | Performed by: INTERNAL MEDICINE

## 2020-02-17 PROCEDURE — 0 TECHNETIUM SESTAMIBI: Performed by: INTERNAL MEDICINE

## 2020-02-17 PROCEDURE — 93016 CV STRESS TEST SUPVJ ONLY: CPT | Performed by: INTERNAL MEDICINE

## 2020-02-17 PROCEDURE — 93017 CV STRESS TEST TRACING ONLY: CPT

## 2020-02-17 PROCEDURE — 82962 GLUCOSE BLOOD TEST: CPT

## 2020-02-17 PROCEDURE — 96376 TX/PRO/DX INJ SAME DRUG ADON: CPT

## 2020-02-17 PROCEDURE — 63710000001 INSULIN ASPART PER 5 UNITS: Performed by: NURSE PRACTITIONER

## 2020-02-17 PROCEDURE — 93018 CV STRESS TEST I&R ONLY: CPT | Performed by: INTERNAL MEDICINE

## 2020-02-17 RX ADMIN — SODIUM CHLORIDE, PRESERVATIVE FREE 10 ML: 5 INJECTION INTRAVENOUS at 09:06

## 2020-02-17 RX ADMIN — TECHNETIUM TC 99M SESTAMIBI 1 DOSE: 1 INJECTION INTRAVENOUS at 09:11

## 2020-02-17 RX ADMIN — LORAZEPAM 0.5 MG: 0.5 TABLET ORAL at 09:56

## 2020-02-17 RX ADMIN — ARIPIPRAZOLE 15 MG: 15 TABLET ORAL at 09:06

## 2020-02-17 RX ADMIN — HYDROCHLOROTHIAZIDE 12.5 MG: 12.5 CAPSULE ORAL at 09:06

## 2020-02-17 RX ADMIN — ASPIRIN 81 MG 81 MG: 81 TABLET ORAL at 09:06

## 2020-02-17 RX ADMIN — INSULIN ASPART 3 UNITS: 100 INJECTION, SOLUTION INTRAVENOUS; SUBCUTANEOUS at 08:59

## 2020-02-17 RX ADMIN — METOPROLOL SUCCINATE 25 MG: 25 TABLET, EXTENDED RELEASE ORAL at 09:06

## 2020-02-17 RX ADMIN — GABAPENTIN 400 MG: 400 CAPSULE ORAL at 21:00

## 2020-02-17 RX ADMIN — CLOPIDOGREL BISULFATE 75 MG: 75 TABLET ORAL at 09:05

## 2020-02-17 RX ADMIN — GABAPENTIN 400 MG: 400 CAPSULE ORAL at 15:14

## 2020-02-17 RX ADMIN — ONDANSETRON 4 MG: 2 SOLUTION INTRAMUSCULAR; INTRAVENOUS at 01:54

## 2020-02-17 RX ADMIN — ATORVASTATIN CALCIUM 80 MG: 40 TABLET, FILM COATED ORAL at 21:00

## 2020-02-17 RX ADMIN — ONDANSETRON 4 MG: 2 SOLUTION INTRAMUSCULAR; INTRAVENOUS at 09:14

## 2020-02-17 RX ADMIN — VENLAFAXINE HYDROCHLORIDE 150 MG: 75 CAPSULE, EXTENDED RELEASE ORAL at 09:05

## 2020-02-17 RX ADMIN — INSULIN ASPART 8 UNITS: 100 INJECTION, SOLUTION INTRAVENOUS; SUBCUTANEOUS at 21:00

## 2020-02-17 RX ADMIN — FLUTICASONE PROPIONATE 1 SPRAY: 50 SPRAY, METERED NASAL at 12:47

## 2020-02-17 RX ADMIN — INSULIN ASPART 8 UNITS: 100 INJECTION, SOLUTION INTRAVENOUS; SUBCUTANEOUS at 17:29

## 2020-02-17 RX ADMIN — GABAPENTIN 400 MG: 400 CAPSULE ORAL at 09:05

## 2020-02-17 RX ADMIN — MIRTAZAPINE 45 MG: 15 TABLET, FILM COATED ORAL at 21:00

## 2020-02-17 RX ADMIN — FUROSEMIDE 40 MG: 40 TABLET ORAL at 09:06

## 2020-02-17 RX ADMIN — ONDANSETRON 4 MG: 2 SOLUTION INTRAMUSCULAR; INTRAVENOUS at 20:59

## 2020-02-17 RX ADMIN — VENLAFAXINE HYDROCHLORIDE 150 MG: 75 CAPSULE, EXTENDED RELEASE ORAL at 20:59

## 2020-02-17 RX ADMIN — INSULIN ASPART 8 UNITS: 100 INJECTION, SOLUTION INTRAVENOUS; SUBCUTANEOUS at 12:23

## 2020-02-18 ENCOUNTER — APPOINTMENT (OUTPATIENT)
Dept: CARDIOLOGY | Facility: HOSPITAL | Age: 60
End: 2020-02-18

## 2020-02-18 ENCOUNTER — APPOINTMENT (OUTPATIENT)
Dept: NUCLEAR MEDICINE | Facility: HOSPITAL | Age: 60
End: 2020-02-18

## 2020-02-18 PROBLEM — I25.9 CHEST PAIN DUE TO MYOCARDIAL ISCHEMIA: Status: ACTIVE | Noted: 2020-02-16

## 2020-02-18 LAB
ACT BLD: 219 SECONDS (ref 82–152)
ANION GAP SERPL CALCULATED.3IONS-SCNC: 12 MMOL/L (ref 5–15)
BASOPHILS # BLD AUTO: 0.05 10*3/MM3 (ref 0–0.2)
BASOPHILS NFR BLD AUTO: 0.5 % (ref 0–1.5)
BH CV STRESS BP STAGE 1: NORMAL
BH CV STRESS COMMENTS STAGE 1: NORMAL
BH CV STRESS DOSE REGADENOSON STAGE 1: 0.4
BH CV STRESS DURATION MIN STAGE 1: 0
BH CV STRESS DURATION SEC STAGE 1: 10
BH CV STRESS HR STAGE 1: 78
BH CV STRESS PROTOCOL 1: NORMAL
BH CV STRESS RECOVERY BP: NORMAL MMHG
BH CV STRESS RECOVERY HR: 80 BPM
BH CV STRESS STAGE 1: 1
BUN BLD-MCNC: 21 MG/DL (ref 6–20)
BUN/CREAT SERPL: 19.6 (ref 7–25)
CALCIUM SPEC-SCNC: 9.6 MG/DL (ref 8.6–10.5)
CHLORIDE SERPL-SCNC: 97 MMOL/L (ref 98–107)
CO2 SERPL-SCNC: 27 MMOL/L (ref 22–29)
CREAT BLD-MCNC: 1.07 MG/DL (ref 0.57–1)
DEPRECATED RDW RBC AUTO: 40.8 FL (ref 37–54)
EOSINOPHIL # BLD AUTO: 0.39 10*3/MM3 (ref 0–0.4)
EOSINOPHIL NFR BLD AUTO: 3.6 % (ref 0.3–6.2)
ERYTHROCYTE [DISTWIDTH] IN BLOOD BY AUTOMATED COUNT: 12.9 % (ref 12.3–15.4)
GFR SERPL CREATININE-BSD FRML MDRD: 53 ML/MIN/1.73
GLUCOSE BLD-MCNC: 177 MG/DL (ref 65–99)
GLUCOSE BLDC GLUCOMTR-MCNC: 152 MG/DL (ref 70–130)
GLUCOSE BLDC GLUCOMTR-MCNC: 374 MG/DL (ref 70–130)
GLUCOSE BLDC GLUCOMTR-MCNC: 376 MG/DL (ref 70–130)
GLUCOSE BLDC GLUCOMTR-MCNC: 395 MG/DL (ref 70–130)
HCT VFR BLD AUTO: 40.2 % (ref 34–46.6)
HGB BLD-MCNC: 13.5 G/DL (ref 12–15.9)
IMM GRANULOCYTES # BLD AUTO: 0.05 10*3/MM3 (ref 0–0.05)
IMM GRANULOCYTES NFR BLD AUTO: 0.5 % (ref 0–0.5)
LYMPHOCYTES # BLD AUTO: 2.93 10*3/MM3 (ref 0.7–3.1)
LYMPHOCYTES NFR BLD AUTO: 27.1 % (ref 19.6–45.3)
MAXIMAL PREDICTED HEART RATE: 162 BPM
MCH RBC QN AUTO: 29.3 PG (ref 26.6–33)
MCHC RBC AUTO-ENTMCNC: 33.6 G/DL (ref 31.5–35.7)
MCV RBC AUTO: 87.4 FL (ref 79–97)
MONOCYTES # BLD AUTO: 0.75 10*3/MM3 (ref 0.1–0.9)
MONOCYTES NFR BLD AUTO: 6.9 % (ref 5–12)
NEUTROPHILS # BLD AUTO: 6.66 10*3/MM3 (ref 1.7–7)
NEUTROPHILS NFR BLD AUTO: 61.4 % (ref 42.7–76)
NRBC BLD AUTO-RTO: 0 /100 WBC (ref 0–0.2)
PERCENT MAX PREDICTED HR: 51.85 %
PLATELET # BLD AUTO: 392 10*3/MM3 (ref 140–450)
PMV BLD AUTO: 9.2 FL (ref 6–12)
POTASSIUM BLD-SCNC: 3.8 MMOL/L (ref 3.5–5.2)
RBC # BLD AUTO: 4.6 10*6/MM3 (ref 3.77–5.28)
SODIUM BLD-SCNC: 136 MMOL/L (ref 136–145)
STRESS BASELINE BP: NORMAL MMHG
STRESS BASELINE HR: 73 BPM
STRESS PERCENT HR: 61 %
STRESS POST ESTIMATED WORKLOAD: 1 METS
STRESS POST PEAK BP: NORMAL MMHG
STRESS POST PEAK HR: 84 BPM
STRESS TARGET HR: 138 BPM
WBC NRBC COR # BLD: 10.83 10*3/MM3 (ref 3.4–10.8)

## 2020-02-18 PROCEDURE — 82962 GLUCOSE BLOOD TEST: CPT

## 2020-02-18 PROCEDURE — C1887 CATHETER, GUIDING: HCPCS | Performed by: INTERNAL MEDICINE

## 2020-02-18 PROCEDURE — 25010000002 FENTANYL CITRATE (PF) 100 MCG/2ML SOLUTION: Performed by: INTERNAL MEDICINE

## 2020-02-18 PROCEDURE — C1874 STENT, COATED/COV W/DEL SYS: HCPCS | Performed by: INTERNAL MEDICINE

## 2020-02-18 PROCEDURE — 93459 L HRT ART/GRFT ANGIO: CPT | Performed by: INTERNAL MEDICINE

## 2020-02-18 PROCEDURE — 25010000002 BIVALIRUDIN TRIFLUOROACETATE 250 MG RECONSTITUTED SOLUTION: Performed by: INTERNAL MEDICINE

## 2020-02-18 PROCEDURE — G0378 HOSPITAL OBSERVATION PER HR: HCPCS

## 2020-02-18 PROCEDURE — 93010 ELECTROCARDIOGRAM REPORT: CPT | Performed by: INTERNAL MEDICINE

## 2020-02-18 PROCEDURE — 94799 UNLISTED PULMONARY SVC/PX: CPT

## 2020-02-18 PROCEDURE — 25010000002 ONDANSETRON PER 1 MG: Performed by: INTERNAL MEDICINE

## 2020-02-18 PROCEDURE — 0 TECHNETIUM SESTAMIBI: Performed by: INTERNAL MEDICINE

## 2020-02-18 PROCEDURE — 99244 OFF/OP CNSLTJ NEW/EST MOD 40: CPT | Performed by: INTERNAL MEDICINE

## 2020-02-18 PROCEDURE — 25010000002 MIDAZOLAM PER 1 MG: Performed by: INTERNAL MEDICINE

## 2020-02-18 PROCEDURE — 25010000002 ONDANSETRON PER 1 MG: Performed by: NURSE PRACTITIONER

## 2020-02-18 PROCEDURE — 80048 BASIC METABOLIC PNL TOTAL CA: CPT | Performed by: NURSE PRACTITIONER

## 2020-02-18 PROCEDURE — 25010000002 REGADENOSON 0.4 MG/5ML SOLUTION: Performed by: NURSE PRACTITIONER

## 2020-02-18 PROCEDURE — C1894 INTRO/SHEATH, NON-LASER: HCPCS | Performed by: INTERNAL MEDICINE

## 2020-02-18 PROCEDURE — C1769 GUIDE WIRE: HCPCS

## 2020-02-18 PROCEDURE — 96376 TX/PRO/DX INJ SAME DRUG ADON: CPT

## 2020-02-18 PROCEDURE — C1769 GUIDE WIRE: HCPCS | Performed by: INTERNAL MEDICINE

## 2020-02-18 PROCEDURE — A9500 TC99M SESTAMIBI: HCPCS | Performed by: INTERNAL MEDICINE

## 2020-02-18 PROCEDURE — 85025 COMPLETE CBC W/AUTO DIFF WBC: CPT | Performed by: NURSE PRACTITIONER

## 2020-02-18 PROCEDURE — 63710000001 INSULIN ASPART PER 5 UNITS: Performed by: NURSE PRACTITIONER

## 2020-02-18 PROCEDURE — C9600 PERC DRUG-EL COR STENT SING: HCPCS | Performed by: INTERNAL MEDICINE

## 2020-02-18 PROCEDURE — 92928 PRQ TCAT PLMT NTRAC ST 1 LES: CPT | Performed by: INTERNAL MEDICINE

## 2020-02-18 PROCEDURE — 63710000001 INSULIN ASPART PER 5 UNITS: Performed by: INTERNAL MEDICINE

## 2020-02-18 PROCEDURE — C9601 PERC DRUG-EL COR STENT BRAN: HCPCS | Performed by: INTERNAL MEDICINE

## 2020-02-18 PROCEDURE — 93005 ELECTROCARDIOGRAM TRACING: CPT | Performed by: INTERNAL MEDICINE

## 2020-02-18 PROCEDURE — 92929 PR PRQ TRLUML CORONARY STENT W/ANGIO ADDL ART/BRNCH: CPT | Performed by: INTERNAL MEDICINE

## 2020-02-18 PROCEDURE — 85347 COAGULATION TIME ACTIVATED: CPT

## 2020-02-18 PROCEDURE — 0 IOPAMIDOL PER 1 ML: Performed by: INTERNAL MEDICINE

## 2020-02-18 PROCEDURE — 25010000002 BIVALIRUDIN TRIFLUOROACETATE 250 MG RECONSTITUTED SOLUTION 1 EACH VIAL: Performed by: INTERNAL MEDICINE

## 2020-02-18 DEVICE — XIENCE SIERRA™ EVEROLIMUS ELUTING CORONARY STENT SYSTEM 3.00 MM X 18 MM / RAPID-EXCHANGE
Type: IMPLANTABLE DEVICE | Status: FUNCTIONAL
Brand: XIENCE SIERRA™

## 2020-02-18 DEVICE — XIENCE SIERRA™ EVEROLIMUS ELUTING CORONARY STENT SYSTEM 2.75 MM X 15 MM / RAPID-EXCHANGE
Type: IMPLANTABLE DEVICE | Status: FUNCTIONAL
Brand: XIENCE SIERRA™

## 2020-02-18 RX ORDER — LIDOCAINE HYDROCHLORIDE 20 MG/ML
INJECTION, SOLUTION INFILTRATION; PERINEURAL AS NEEDED
Status: DISCONTINUED | OUTPATIENT
Start: 2020-02-18 | End: 2020-02-18 | Stop reason: HOSPADM

## 2020-02-18 RX ORDER — BISACODYL 5 MG/1
5 TABLET, DELAYED RELEASE ORAL DAILY PRN
Status: DISCONTINUED | OUTPATIENT
Start: 2020-02-18 | End: 2020-02-19 | Stop reason: HOSPADM

## 2020-02-18 RX ORDER — ACETAMINOPHEN 325 MG/1
650 TABLET ORAL EVERY 4 HOURS PRN
Status: DISCONTINUED | OUTPATIENT
Start: 2020-02-18 | End: 2020-02-19 | Stop reason: HOSPADM

## 2020-02-18 RX ORDER — ALUMINA, MAGNESIA, AND SIMETHICONE 2400; 2400; 240 MG/30ML; MG/30ML; MG/30ML
15 SUSPENSION ORAL EVERY 6 HOURS PRN
Status: DISCONTINUED | OUTPATIENT
Start: 2020-02-18 | End: 2020-02-19 | Stop reason: HOSPADM

## 2020-02-18 RX ORDER — BIVALIRUDIN 250 MG/5ML
INJECTION, POWDER, LYOPHILIZED, FOR SOLUTION INTRAVENOUS AS NEEDED
Status: DISCONTINUED | OUTPATIENT
Start: 2020-02-18 | End: 2020-02-18 | Stop reason: HOSPADM

## 2020-02-18 RX ORDER — CLOPIDOGREL BISULFATE 75 MG/1
TABLET ORAL AS NEEDED
Status: DISCONTINUED | OUTPATIENT
Start: 2020-02-18 | End: 2020-02-18 | Stop reason: HOSPADM

## 2020-02-18 RX ORDER — MIDAZOLAM HYDROCHLORIDE 1 MG/ML
INJECTION INTRAMUSCULAR; INTRAVENOUS AS NEEDED
Status: DISCONTINUED | OUTPATIENT
Start: 2020-02-18 | End: 2020-02-18 | Stop reason: HOSPADM

## 2020-02-18 RX ORDER — SODIUM CHLORIDE 9 MG/ML
250 INJECTION, SOLUTION INTRAVENOUS ONCE AS NEEDED
Status: DISCONTINUED | OUTPATIENT
Start: 2020-02-18 | End: 2020-02-19 | Stop reason: HOSPADM

## 2020-02-18 RX ORDER — ONDANSETRON 2 MG/ML
INJECTION INTRAMUSCULAR; INTRAVENOUS AS NEEDED
Status: DISCONTINUED | OUTPATIENT
Start: 2020-02-18 | End: 2020-02-18 | Stop reason: HOSPADM

## 2020-02-18 RX ORDER — ASPIRIN 81 MG/1
TABLET, CHEWABLE ORAL AS NEEDED
Status: DISCONTINUED | OUTPATIENT
Start: 2020-02-18 | End: 2020-02-18 | Stop reason: HOSPADM

## 2020-02-18 RX ORDER — HYDROCODONE BITARTRATE AND ACETAMINOPHEN 7.5; 325 MG/1; MG/1
1 TABLET ORAL EVERY 4 HOURS PRN
Status: DISCONTINUED | OUTPATIENT
Start: 2020-02-18 | End: 2020-02-19 | Stop reason: HOSPADM

## 2020-02-18 RX ORDER — AMOXICILLIN 250 MG
2 CAPSULE ORAL 2 TIMES DAILY
Status: DISCONTINUED | OUTPATIENT
Start: 2020-02-18 | End: 2020-02-19 | Stop reason: HOSPADM

## 2020-02-18 RX ORDER — ASPIRIN 81 MG/1
81 TABLET ORAL DAILY
Status: DISCONTINUED | OUTPATIENT
Start: 2020-02-18 | End: 2020-02-19 | Stop reason: HOSPADM

## 2020-02-18 RX ORDER — PANTOPRAZOLE SODIUM 20 MG/1
20 TABLET, DELAYED RELEASE ORAL DAILY
Status: DISCONTINUED | OUTPATIENT
Start: 2020-02-18 | End: 2020-02-19 | Stop reason: HOSPADM

## 2020-02-18 RX ORDER — HYDROCODONE BITARTRATE AND ACETAMINOPHEN 5; 325 MG/1; MG/1
1 TABLET ORAL EVERY 4 HOURS PRN
Status: DISCONTINUED | OUTPATIENT
Start: 2020-02-18 | End: 2020-02-19 | Stop reason: HOSPADM

## 2020-02-18 RX ORDER — FENTANYL CITRATE 50 UG/ML
25 INJECTION, SOLUTION INTRAMUSCULAR; INTRAVENOUS
Status: DISCONTINUED | OUTPATIENT
Start: 2020-02-18 | End: 2020-02-19 | Stop reason: HOSPADM

## 2020-02-18 RX ORDER — BISACODYL 10 MG
10 SUPPOSITORY, RECTAL RECTAL DAILY PRN
Status: DISCONTINUED | OUTPATIENT
Start: 2020-02-18 | End: 2020-02-19 | Stop reason: HOSPADM

## 2020-02-18 RX ORDER — FENTANYL CITRATE 50 UG/ML
INJECTION, SOLUTION INTRAMUSCULAR; INTRAVENOUS AS NEEDED
Status: DISCONTINUED | OUTPATIENT
Start: 2020-02-18 | End: 2020-02-18 | Stop reason: HOSPADM

## 2020-02-18 RX ORDER — SODIUM CHLORIDE 0.9 % (FLUSH) 0.9 %
10 SYRINGE (ML) INJECTION ONCE
Status: COMPLETED | OUTPATIENT
Start: 2020-02-18 | End: 2020-02-18

## 2020-02-18 RX ORDER — ONDANSETRON 4 MG/1
4 TABLET, FILM COATED ORAL EVERY 8 HOURS PRN
Status: DISCONTINUED | OUTPATIENT
Start: 2020-02-18 | End: 2020-02-19 | Stop reason: HOSPADM

## 2020-02-18 RX ORDER — NALOXONE HCL 0.4 MG/ML
0.4 VIAL (ML) INJECTION
Status: DISCONTINUED | OUTPATIENT
Start: 2020-02-18 | End: 2020-02-19 | Stop reason: HOSPADM

## 2020-02-18 RX ORDER — SODIUM CHLORIDE 9 MG/ML
100 INJECTION, SOLUTION INTRAVENOUS CONTINUOUS
Status: DISCONTINUED | OUTPATIENT
Start: 2020-02-18 | End: 2020-02-19 | Stop reason: HOSPADM

## 2020-02-18 RX ADMIN — ASPIRIN 81 MG: 81 TABLET, COATED ORAL at 20:20

## 2020-02-18 RX ADMIN — ONDANSETRON 4 MG: 2 SOLUTION INTRAMUSCULAR; INTRAVENOUS at 09:01

## 2020-02-18 RX ADMIN — GABAPENTIN 400 MG: 400 CAPSULE ORAL at 20:25

## 2020-02-18 RX ADMIN — SODIUM CHLORIDE 100 ML/HR: 9 INJECTION, SOLUTION INTRAVENOUS at 20:22

## 2020-02-18 RX ADMIN — VENLAFAXINE HYDROCHLORIDE 150 MG: 75 CAPSULE, EXTENDED RELEASE ORAL at 09:00

## 2020-02-18 RX ADMIN — ASPIRIN 81 MG 81 MG: 81 TABLET ORAL at 09:00

## 2020-02-18 RX ADMIN — ARIPIPRAZOLE 15 MG: 15 TABLET ORAL at 09:00

## 2020-02-18 RX ADMIN — METOPROLOL SUCCINATE 25 MG: 25 TABLET, EXTENDED RELEASE ORAL at 09:00

## 2020-02-18 RX ADMIN — SODIUM CHLORIDE, PRESERVATIVE FREE 10 ML: 5 INJECTION INTRAVENOUS at 20:21

## 2020-02-18 RX ADMIN — MIRTAZAPINE 45 MG: 15 TABLET, FILM COATED ORAL at 20:24

## 2020-02-18 RX ADMIN — TECHNETIUM TC 99M SESTAMIBI 1 DOSE: 1 INJECTION INTRAVENOUS at 08:35

## 2020-02-18 RX ADMIN — FLUTICASONE PROPIONATE 1 SPRAY: 50 SPRAY, METERED NASAL at 09:04

## 2020-02-18 RX ADMIN — INSULIN ASPART 12 UNITS: 100 INJECTION, SOLUTION INTRAVENOUS; SUBCUTANEOUS at 13:05

## 2020-02-18 RX ADMIN — CLOPIDOGREL BISULFATE 75 MG: 75 TABLET ORAL at 09:00

## 2020-02-18 RX ADMIN — GABAPENTIN 400 MG: 400 CAPSULE ORAL at 09:01

## 2020-02-18 RX ADMIN — HYDROCODONE BITARTRATE AND ACETAMINOPHEN 1 TABLET: 7.5; 325 TABLET ORAL at 10:22

## 2020-02-18 RX ADMIN — ATORVASTATIN CALCIUM 80 MG: 40 TABLET, FILM COATED ORAL at 20:26

## 2020-02-18 RX ADMIN — SODIUM CHLORIDE, PRESERVATIVE FREE 10 ML: 5 INJECTION INTRAVENOUS at 08:36

## 2020-02-18 RX ADMIN — VENLAFAXINE HYDROCHLORIDE 150 MG: 75 CAPSULE, EXTENDED RELEASE ORAL at 20:25

## 2020-02-18 RX ADMIN — INSULIN ASPART 20 UNITS: 100 INJECTION, SOLUTION INTRAVENOUS; SUBCUTANEOUS at 20:47

## 2020-02-18 RX ADMIN — HYDROCODONE BITARTRATE AND ACETAMINOPHEN 1 TABLET: 5; 325 TABLET ORAL at 20:24

## 2020-02-18 RX ADMIN — SODIUM CHLORIDE, PRESERVATIVE FREE 10 ML: 5 INJECTION INTRAVENOUS at 09:03

## 2020-02-18 RX ADMIN — HYDROCHLOROTHIAZIDE 12.5 MG: 12.5 CAPSULE ORAL at 09:00

## 2020-02-18 RX ADMIN — REGADENOSON 0.4 MG: 0.08 INJECTION, SOLUTION INTRAVENOUS at 08:34

## 2020-02-18 RX ADMIN — FUROSEMIDE 40 MG: 40 TABLET ORAL at 09:00

## 2020-02-18 RX ADMIN — INSULIN ASPART 8 UNITS: 100 INJECTION, SOLUTION INTRAVENOUS; SUBCUTANEOUS at 18:55

## 2020-02-18 RX ADMIN — PANTOPRAZOLE SODIUM 20 MG: 20 TABLET, DELAYED RELEASE ORAL at 20:21

## 2020-02-18 RX ADMIN — SODIUM CHLORIDE 100 ML/HR: 9 INJECTION, SOLUTION INTRAVENOUS at 18:32

## 2020-02-19 VITALS
SYSTOLIC BLOOD PRESSURE: 118 MMHG | DIASTOLIC BLOOD PRESSURE: 59 MMHG | RESPIRATION RATE: 18 BRPM | HEART RATE: 61 BPM | OXYGEN SATURATION: 97 % | WEIGHT: 256.84 LBS | TEMPERATURE: 98.4 F | BODY MASS INDEX: 38.93 KG/M2 | HEIGHT: 68 IN

## 2020-02-19 LAB
ACT BLD: 138 SECONDS (ref 82–152)
ANION GAP SERPL CALCULATED.3IONS-SCNC: 10 MMOL/L (ref 5–15)
BASOPHILS # BLD AUTO: 0.04 10*3/MM3 (ref 0–0.2)
BASOPHILS NFR BLD AUTO: 0.4 % (ref 0–1.5)
BUN BLD-MCNC: 17 MG/DL (ref 6–20)
BUN/CREAT SERPL: 22.4 (ref 7–25)
CALCIUM SPEC-SCNC: 6.9 MG/DL (ref 8.6–10.5)
CHLORIDE SERPL-SCNC: 109 MMOL/L (ref 98–107)
CHOLEST SERPL-MCNC: 115 MG/DL (ref 0–200)
CO2 SERPL-SCNC: 23 MMOL/L (ref 22–29)
CREAT BLD-MCNC: 0.76 MG/DL (ref 0.57–1)
DEPRECATED RDW RBC AUTO: 41.7 FL (ref 37–54)
EOSINOPHIL # BLD AUTO: 0.25 10*3/MM3 (ref 0–0.4)
EOSINOPHIL NFR BLD AUTO: 2.4 % (ref 0.3–6.2)
ERYTHROCYTE [DISTWIDTH] IN BLOOD BY AUTOMATED COUNT: 12.9 % (ref 12.3–15.4)
GFR SERPL CREATININE-BSD FRML MDRD: 78 ML/MIN/1.73
GLUCOSE BLD-MCNC: 145 MG/DL (ref 65–99)
GLUCOSE BLDC GLUCOMTR-MCNC: 217 MG/DL (ref 70–130)
GLUCOSE BLDC GLUCOMTR-MCNC: 236 MG/DL (ref 70–130)
HBA1C MFR BLD: 9.4 % (ref 4.8–5.6)
HCT VFR BLD AUTO: 28.6 % (ref 34–46.6)
HCT VFR BLD AUTO: 36.6 % (ref 34–46.6)
HDLC SERPL-MCNC: 27 MG/DL (ref 40–60)
HGB BLD-MCNC: 12.2 G/DL (ref 12–15.9)
HGB BLD-MCNC: 9.6 G/DL (ref 12–15.9)
IMM GRANULOCYTES # BLD AUTO: 0.03 10*3/MM3 (ref 0–0.05)
IMM GRANULOCYTES NFR BLD AUTO: 0.3 % (ref 0–0.5)
LDLC SERPL CALC-MCNC: 67 MG/DL (ref 0–100)
LDLC/HDLC SERPL: 2.47 {RATIO}
LYMPHOCYTES # BLD AUTO: 1.48 10*3/MM3 (ref 0.7–3.1)
LYMPHOCYTES NFR BLD AUTO: 14 % (ref 19.6–45.3)
MCH RBC QN AUTO: 29.8 PG (ref 26.6–33)
MCHC RBC AUTO-ENTMCNC: 33.6 G/DL (ref 31.5–35.7)
MCV RBC AUTO: 88.8 FL (ref 79–97)
MONOCYTES # BLD AUTO: 0.67 10*3/MM3 (ref 0.1–0.9)
MONOCYTES NFR BLD AUTO: 6.3 % (ref 5–12)
NEUTROPHILS # BLD AUTO: 8.12 10*3/MM3 (ref 1.7–7)
NEUTROPHILS NFR BLD AUTO: 76.6 % (ref 42.7–76)
NRBC BLD AUTO-RTO: 0 /100 WBC (ref 0–0.2)
PLATELET # BLD AUTO: 298 10*3/MM3 (ref 140–450)
PMV BLD AUTO: 9.3 FL (ref 6–12)
POTASSIUM BLD-SCNC: 3 MMOL/L (ref 3.5–5.2)
POTASSIUM BLD-SCNC: 4.2 MMOL/L (ref 3.5–5.2)
RBC # BLD AUTO: 3.22 10*6/MM3 (ref 3.77–5.28)
SODIUM BLD-SCNC: 142 MMOL/L (ref 136–145)
TRIGL SERPL-MCNC: 106 MG/DL (ref 0–150)
VLDLC SERPL-MCNC: 21.2 MG/DL
WBC NRBC COR # BLD: 10.59 10*3/MM3 (ref 3.4–10.8)

## 2020-02-19 PROCEDURE — 85347 COAGULATION TIME ACTIVATED: CPT

## 2020-02-19 PROCEDURE — 82962 GLUCOSE BLOOD TEST: CPT

## 2020-02-19 PROCEDURE — 85014 HEMATOCRIT: CPT | Performed by: NURSE PRACTITIONER

## 2020-02-19 PROCEDURE — 83036 HEMOGLOBIN GLYCOSYLATED A1C: CPT | Performed by: INTERNAL MEDICINE

## 2020-02-19 PROCEDURE — 80061 LIPID PANEL: CPT | Performed by: INTERNAL MEDICINE

## 2020-02-19 PROCEDURE — 63710000001 INSULIN ASPART PER 5 UNITS: Performed by: INTERNAL MEDICINE

## 2020-02-19 PROCEDURE — 85018 HEMOGLOBIN: CPT | Performed by: NURSE PRACTITIONER

## 2020-02-19 PROCEDURE — 80048 BASIC METABOLIC PNL TOTAL CA: CPT | Performed by: INTERNAL MEDICINE

## 2020-02-19 PROCEDURE — 63710000001 ONDANSETRON PER 8 MG: Performed by: INTERNAL MEDICINE

## 2020-02-19 PROCEDURE — 85025 COMPLETE CBC W/AUTO DIFF WBC: CPT | Performed by: INTERNAL MEDICINE

## 2020-02-19 PROCEDURE — G0378 HOSPITAL OBSERVATION PER HR: HCPCS

## 2020-02-19 PROCEDURE — 84132 ASSAY OF SERUM POTASSIUM: CPT | Performed by: NURSE PRACTITIONER

## 2020-02-19 PROCEDURE — 99214 OFFICE O/P EST MOD 30 MIN: CPT | Performed by: INTERNAL MEDICINE

## 2020-02-19 RX ORDER — POTASSIUM CHLORIDE 1.5 G/1.77G
40 POWDER, FOR SOLUTION ORAL AS NEEDED
Status: DISCONTINUED | OUTPATIENT
Start: 2020-02-19 | End: 2020-02-19 | Stop reason: HOSPADM

## 2020-02-19 RX ORDER — POTASSIUM CHLORIDE 750 MG/1
40 CAPSULE, EXTENDED RELEASE ORAL AS NEEDED
Status: DISCONTINUED | OUTPATIENT
Start: 2020-02-19 | End: 2020-02-19 | Stop reason: HOSPADM

## 2020-02-19 RX ORDER — POTASSIUM CHLORIDE 7.45 MG/ML
10 INJECTION INTRAVENOUS
Status: DISCONTINUED | OUTPATIENT
Start: 2020-02-19 | End: 2020-02-19 | Stop reason: HOSPADM

## 2020-02-19 RX ORDER — DIPHENHYDRAMINE HYDROCHLORIDE, ZINC ACETATE 2; .1 G/100G; G/100G
CREAM TOPICAL 3 TIMES DAILY PRN
Status: DISCONTINUED | OUTPATIENT
Start: 2020-02-19 | End: 2020-02-19 | Stop reason: HOSPADM

## 2020-02-19 RX ADMIN — ONDANSETRON HYDROCHLORIDE 4 MG: 4 TABLET, FILM COATED ORAL at 11:22

## 2020-02-19 RX ADMIN — METOPROLOL SUCCINATE 25 MG: 25 TABLET, EXTENDED RELEASE ORAL at 09:40

## 2020-02-19 RX ADMIN — INSULIN ASPART 8 UNITS: 100 INJECTION, SOLUTION INTRAVENOUS; SUBCUTANEOUS at 06:38

## 2020-02-19 RX ADMIN — ONDANSETRON HYDROCHLORIDE 4 MG: 4 TABLET, FILM COATED ORAL at 03:22

## 2020-02-19 RX ADMIN — PANTOPRAZOLE SODIUM 20 MG: 20 TABLET, DELAYED RELEASE ORAL at 09:01

## 2020-02-19 RX ADMIN — DIPHENHYDRAMINE HYDROCHLORIDE, ZINC ACETATE 1 APPLICATION: 2; .1 CREAM TOPICAL at 13:34

## 2020-02-19 RX ADMIN — HYDROCODONE BITARTRATE AND ACETAMINOPHEN 1 TABLET: 5; 325 TABLET ORAL at 09:10

## 2020-02-19 RX ADMIN — CLOPIDOGREL BISULFATE 75 MG: 75 TABLET ORAL at 09:01

## 2020-02-19 RX ADMIN — HYDROCODONE BITARTRATE AND ACETAMINOPHEN 1 TABLET: 7.5; 325 TABLET ORAL at 04:31

## 2020-02-19 RX ADMIN — FUROSEMIDE 40 MG: 40 TABLET ORAL at 09:10

## 2020-02-19 RX ADMIN — GABAPENTIN 400 MG: 400 CAPSULE ORAL at 09:01

## 2020-02-19 RX ADMIN — VENLAFAXINE HYDROCHLORIDE 150 MG: 75 CAPSULE, EXTENDED RELEASE ORAL at 09:01

## 2020-02-19 RX ADMIN — POTASSIUM CHLORIDE 40 MEQ: 750 CAPSULE, EXTENDED RELEASE ORAL at 09:40

## 2020-02-19 RX ADMIN — GABAPENTIN 400 MG: 400 CAPSULE ORAL at 16:16

## 2020-02-19 RX ADMIN — HYDROCHLOROTHIAZIDE 12.5 MG: 12.5 CAPSULE ORAL at 09:10

## 2020-02-19 RX ADMIN — INSULIN ASPART 8 UNITS: 100 INJECTION, SOLUTION INTRAVENOUS; SUBCUTANEOUS at 11:22

## 2020-02-19 RX ADMIN — ASPIRIN 81 MG: 81 TABLET, COATED ORAL at 09:01

## 2020-02-19 RX ADMIN — ARIPIPRAZOLE 15 MG: 15 TABLET ORAL at 09:01

## 2020-02-19 RX ADMIN — POTASSIUM CHLORIDE 40 MEQ: 750 CAPSULE, EXTENDED RELEASE ORAL at 05:39

## 2020-02-19 RX ADMIN — FLUTICASONE PROPIONATE 1 SPRAY: 50 SPRAY, METERED NASAL at 09:10

## 2020-02-19 RX ADMIN — POTASSIUM CHLORIDE 40 MEQ: 750 CAPSULE, EXTENDED RELEASE ORAL at 13:40

## 2020-02-19 NOTE — H&P
Progress Notes  Encounter Date: 10/19/2018    Julián strauss  Gastroenterology   Expand All Collapse All    []Manual[]Template  []Copied      Chief Complaint   Patient presents with   • Follow-up            Subjective       Ariana Martinez is a 58 y.o. female. she is here today for follow-up.     History of Present Illness  8-year-old female with history of gastroparesis on Reglan presents due to upper abdominal pain.  States she has intermittent nausea described episodes every few weeks.  States her bowel movements vary from constipation to diarrhea.  Denies any melena or hematochezia within her stool.  Her weight is stable.  Reports her reflux is controlled with Protonix which she has been on for some time however she has intermittent epigastric pain.previously saw the patient 9/12/17 due to nausea vomiting and changes in her bowel habits.  I had scheduled EGD and colonoscopy however she reports bowel habits returned to normal which is variable for her and she does not want to reschedule colonoscopy at this time.  Plan; we'll schedule patient for EGD due to gastritis, epigastric pain and chronic reflux.  I recommended repeat colonoscopy for surveillance as prior colonoscopy was cleared in 2013 however patient declines to schedule colonoscopy at this time.        The following portions of the patient's history were reviewed and updated as appropriate:   Medical History        Past Medical History:   Diagnosis Date   • Acquired hypothyroidism     • Angina, class IV (CMS/HCC)     • Anxiety     • Benign paroxysmal positional vertigo     • Carpal tunnel syndrome     • Chronic pain     • Coronary atherosclerosis     • Depression     • Diabetes mellitus (CMS/HCC)       Type 2, controlled   • Diabetic polyneuropathy (CMS/HCC)     • Female stress incontinence     • Gastroparesis     • Hashimoto's thyroiditis     • Hyperlipidemia     • Hypertension     • Low back pain     • Malaise and fatigue     • Multiple joint pain      • Obesity       Refuses to be weighed   • Otalgia       Both   • Perforation of tympanic membrane       Left   • Postoperative wound infection     • Vitamin D deficiency           Surgical History         Past Surgical History:   Procedure Laterality Date   • ABDOMINAL SURGERY       • ANGIOPLASTY         coronary   • BREAST BIOPSY Right     • CARDIAC CATHETERIZATION       • CARDIAC CATHETERIZATION N/A 2017     Procedure: Right Heart Cath;  Surgeon: Can Kwon MD PhD;  Location: [facility] INVASIVE LOC[facility];  Service:    • CARPAL TUNNEL RELEASE       • CHOLECYSTECTOMY       • CORONARY ARTERY BYPASS GRAFT      • ENDOSCOPY AND COLONOSCOPY       • FOOT SURGERY         Toes   • GASTRIC BANDING         Revision, laparoscopic   • HYSTERECTOMY       • MOUTH SURGERY       • SALPINGO OOPHORECTOMY       • SHOULDER SURGERY       • TRANSESOPHAGEAL ECHOCARDIOGRAM (LAMONTE)         With color flow               Family History   Problem Relation Age of Onset   • Diabetes Other     • Heart disease Other     • Hypertension Other     • Heart disease Mother     • Stroke Mother     • Hypertension Mother     • Diabetes Sister     • Heart disease Sister     • Hypertension Sister     • Heart disease Sister     • Diabetes Sister     • Hypertension Sister     • Diabetes Sister     • Diabetes Sister     • Diabetes Sister     • Diabetes Sister                 OB History       Para Term  AB Living     0 0 0 0 0 0     SAB TAB Ectopic Molar Multiple Live Births     0 0 0   0                   Current Outpatient Prescriptions   Medication Sig Dispense Refill   • ARIPiprazole (ABILIFY) 15 MG tablet TAKE 1 TABLET BY MOUTH DAILY. 30 tablet 5   • aspirin 81 MG chewable tablet Chew 81 mg daily.       • atorvastatin (LIPITOR) 40 MG tablet TAKE 1 TABLET BY MOUTH EVERY NIGHT. 90 tablet 1   • BD SHARPS CONTAINER HOME misc 1 each Take As Directed. 1 each 0   • Blood Glucose Monitoring Suppl (ONE TOUCH ULTRA MINI) w/Device  kit USE AS DIRECTED TO CHECK BLOOD SUGAR 1 each 0   • Calcium Citrate-Vitamin D (CITRACAL/VITAMIN D) 250-200 MG-UNIT tablet Take 2 tablets by mouth 2 (two) times a day.       • cefprozil (CEFZIL) 500 MG tablet Take 1 tablet by mouth 2 (Two) Times a Day for 10 days. 20 tablet 0   • clopidogrel (PLAVIX) 75 MG tablet TAKE 1 TABLET BY MOUTH DAILY. 90 tablet 3   • Cyanocobalamin 1000 MCG/ML kit Inject 1,000 mcg as directed Every 28 (Twenty-Eight) Days. 1 kit 0   • fluticasone (FLONASE) 50 MCG/ACT nasal spray 2 sprays into the nostril(s) as directed by provider Daily for 30 days. 1 bottle 0   • furosemide (LASIX) 40 MG tablet Take 1 tablet by mouth Daily. 90 tablet 3   • gabapentin (NEURONTIN) 400 MG capsule Take 1 capsule by mouth 3 (Three) Times a Day. 90 capsule 2   • GLUCAGON EMERGENCY 1 MG injection USE AS DIRECTED AS NEEDED 1 kit 0   • glucose blood (ONE TOUCH ULTRA TEST) test strip 1 each by Other route 4 (Four) Times a Day. Use as instructed 400 each 1   • hydrochlorothiazide (MICROZIDE) 12.5 MG capsule TAKE 1 CAPSULE BY MOUTH DAILY. 90 capsule 0   • HYDROcodone-acetaminophen (NORCO) 7.5-325 MG per tablet Take 1 tablet by mouth Every 4 (Four) Hours As Needed for Moderate Pain . 180 tablet 0   • insulin aspart (NOVOLOG FLEXPEN) 100 UNIT/ML solution pen-injector sc pen 60 units qac tid 18 pen 11   • Insulin Glargine (BASAGLAR KWIKPEN) 100 UNIT/ML injection pen 100 units q hs 5 pen 5   • Insulin Pen Needle (B-D ULTRAFINE III SHORT PEN) 31G X 8 MM misc Inject 1 each into the shoulder, thigh, or buttocks 4 (Four) Times a Day. use as directed 150 each 11   • LORazepam (ATIVAN) 0.5 MG tablet Take 1 tablet by mouth Daily As Needed for Anxiety. 15 tablet 0   • meclizine (ANTIVERT) 25 MG tablet Take 1 tablet by mouth 3 (Three) Times a Day As Needed for dizziness. 90 tablet 6   • metoclopramide (REGLAN) 10 MG tablet Take 1 tablet by mouth 4 (Four) Times a Day Before Meals & at Bedtime. (Patient taking differently: Take 10 mg  "by mouth 4 (Four) Times a Day As Needed.) 30 tablet 0   • metoprolol succinate XL (TOPROL-XL) 25 MG 24 hr tablet TAKE 1 TABLET BY MOUTH DAILY. 31 tablet 6   • mirtazapine (REMERON) 45 MG tablet TAKE 1 TABLET BY MOUTH EVERY NIGHT. 90 tablet 1   • ondansetron (ZOFRAN) 8 MG tablet Take 1 tablet by mouth Every 8 (Eight) Hours. (Patient taking differently: Take 8 mg by mouth Every 8 (Eight) Hours As Needed.) 90 tablet 3   • ONE TOUCH ULTRA TEST test strip USE TO TEST SUGAR 4 TIMES A  each 3   • pantoprazole (PROTONIX) 40 MG EC tablet TAKE 1 TABLET BY MOUTH DAILY. 90 tablet 2   • promethazine-dextromethorphan (PROMETHAZINE-DM) 6.25-15 MG/5ML syrup Take one teaspoon qhs prn cough 120 mL 0   • Syringe/Needle, Disp, (LUER LOCK SAFETY SYRINGES) 25G X 5/8\" 3 ML misc 1 each Daily. 100 each 0   • venlafaxine XR (EFFEXOR-XR) 150 MG 24 hr capsule TAKE ONE CAPSULE BY MOUTH TWICE A DAY FOR DEPRESSION 180 capsule 3   • vitamin D (ERGOCALCIFEROL) 12439 units capsule capsule TAKE ONE CAPSULE BY MOUTH EVERY SUNDAY 12 capsule 2                Current Facility-Administered Medications   Medication Dose Route Frequency Provider Last Rate Last Dose   • cyanocobalamin injection 1,000 mcg  1,000 mcg Intramuscular Q28 Days Francisca Fong MD   1,000 mcg at 08/02/18 0807            Allergies   Allergen Reactions   • Seroquel [Quetiapine Fumarate] Anaphylaxis   • Avandia [Rosiglitazone] Swelling   • Morphine And Related Hallucinations       If patient takes too much      Social History   Social History           Social History   • Marital status:              Social History Main Topics   • Smoking status: Never Smoker   • Smokeless tobacco: Never Used   • Alcohol use No   • Drug use: No   • Sexual activity: Defer         Comment:            Other Topics Concern   • Not on file            Review of Systems  Review of Systems   Constitutional: Negative for activity change, appetite change, chills, diaphoresis, fatigue, " "fever and unexpected weight change.   HENT: Negative for sore throat and trouble swallowing.    Respiratory: Negative for shortness of breath.    Gastrointestinal: Positive for abdominal distention (bloating ), abdominal pain (upper ), constipation, diarrhea and nausea. Negative for anal bleeding, blood in stool, rectal pain and vomiting.   Musculoskeletal: Negative for arthralgias.   Skin: Negative for pallor.   Neurological: Negative for light-headedness.                    /78   Pulse 96   Ht 172.7 cm (68\")   Wt 120 kg (264 lb 3.2 oz)   SpO2 96%   BMI 40.17 kg/m²         Objective       Physical Exam   Constitutional: She is oriented to person, place, and time. She appears well-developed and well-nourished. She is cooperative. No distress.   HENT:   Head: Normocephalic and atraumatic.   Neck: Normal range of motion. Neck supple. No thyromegaly present.   Cardiovascular: Normal rate, regular rhythm and normal heart sounds.    Pulmonary/Chest: Effort normal and breath sounds normal. She has no wheezes. She has no rhonchi. She has no rales.   Abdominal: Soft. Normal appearance and bowel sounds are normal. She exhibits no shifting dullness, no distension, no fluid wave and no ascites. There is no hepatosplenomegaly. There is tenderness in the right upper quadrant, epigastric area and left upper quadrant. There is no rigidity and no guarding. No hernia.   Lymphadenopathy:     She has no cervical adenopathy.   Neurological: She is alert and oriented to person, place, and time.   Skin: Skin is warm, dry and intact. No rash noted. No pallor.   Psychiatric: She has a normal mood and affect. Her speech is normal.              Office Visit on 07/27/2018   Component Date Value Ref Range Status   • Hemoglobin A1C 07/27/2018 6.4  % Final         Assessment/Plan          1. Upper abdominal pain    2. Gastroparesis    .         Orders placed during this encounter include:        Orders Placed This Encounter "   Procedures   • Follow Anesthesia Guidelines / Standing Orders       Standing Status:   Future         ESOPHAGOGASTRODUODENOSCOPY possible dilation (N/A)     Review and/or summary of lab tests, radiology, procedures, medications. Review and summary of old records and obtaining of history. The risks and benefits of my recommendations, as well as other treatment options were discussed with the patient today. Questions were answered.     No orders of the defined types were placed in this encounter.              This document has been electronically signed by Julián Maldonado MD on October 19, 2018 12:35 PM               Results for orders placed or performed in visit on 07/27/18   POC Glycosylated Hemoglobin (Hb A1C)   Result Value Ref Range     Hemoglobin A1C 6.4 %   Results for orders placed or performed during the hospital encounter of 07/21/18   Gold Top - SST   Result Value Ref Range     Extra Tube Hold for add-ons.     Gold Top - SST   Result Value Ref Range     Extra Tube Hold for add-ons.     Green Top (Gel)   Result Value Ref Range     Extra Tube Hold for add-ons.     Green Top (Gel)   Result Value Ref Range     Extra Tube Hold for add-ons.     Urinalysis With Culture If Indicated - Urine, Clean Catch   Result Value Ref Range     Color, UA Yellow Yellow, Straw, Dark Yellow, Kristel     Appearance, UA Clear Clear     pH, UA 6.0 5.0 - 9.0     Specific Griffin, UA 1.015 1.003 - 1.030     Glucose, UA Negative Negative     Ketones, UA Negative Negative     Bilirubin, UA Negative Negative     Blood, UA Negative Negative     Protein, UA Trace (A) Negative     Leuk Esterase, UA Negative Negative     Nitrite, UA Negative Negative     Urobilinogen, UA 1.0 E.U./dL 0.2 - 1.0 E.U./dL   Respiratory Panel, PCR - Swab, Nasopharynx   Result Value Ref Range     ADENOVIRUS, PCR Not Detected Not Detected     Coronavirus 229E Not Detected Not Detected     Coronavirus HKU1 Not Detected Not Detected     Coronavirus NL63 Not Detected  Not Detected     Coronavirus OC43 Not Detected Not Detected     Human Metapneumovirus Not Detected Not Detected     Human Rhinovirus/Enterovirus Not Detected Not Detected     Influenza B PCR Not Detected Not Detected     Parainfluenza Virus 1 Not Detected Not Detected     Parainfluenza Virus 2 Not Detected Not Detected     Parainfluenza Virus 3 Not Detected Not Detected     Parainfluenza Virus 4 Not Detected Not Detected     Bordetella pertussis pcr Not Detected Not Detected     Influenza A H1 2009 PCR Not Detected Not Detected     Chlamydophila pneumoniae PCR Not Detected Not Detected     Mycoplasma pneumo by PCR Not Detected Not Detected     Influenza A PCR Not Detected Not Detected     Influenza A H3 Not Detected Not Detected     Influenza A H1 Not Detected Not Detected     RSV, PCR Not Detected Not Detected   General acute hospital (IgG / M)   Result Value Ref Range     RMSF IgG Negative Negative     RMSF IgM 0.13 0.00 - 0.89 index   Babesia Microti Antibody Panel   Result Value Ref Range     Babesia microti IgM <1:10 Neg:<1:10     Babesia microti IgG <1:10 Neg:<1:10   CBC Auto Differential   Result Value Ref Range     WBC 10.00 (H) 3.20 - 9.80 10*3/mm3     RBC 4.00 3.77 - 5.16 10*6/mm3     Hemoglobin 11.1 (L) 12.0 - 15.5 g/dL     Hematocrit 34.3 (L) 35.0 - 45.0 %     MCV 85.8 80.0 - 98.0 fL     MCH 27.8 26.5 - 34.0 pg     MCHC 32.4 31.4 - 36.0 g/dL     RDW 14.4 11.5 - 14.5 %     RDW-SD 45.0 36.4 - 46.3 fl     MPV 9.4 8.0 - 12.0 fL     Platelets 372 150 - 450 10*3/mm3     Neutrophil % 62.0 37.0 - 80.0 %     Lymphocyte % 25.2 10.0 - 50.0 %     Monocyte % 7.3 0.0 - 12.0 %     Eosinophil % 4.9 0.0 - 7.0 %     Basophil % 0.2 0.0 - 2.0 %     Immature Grans % 0.4 0.0 - 0.5 %     Neutrophils, Absolute 6.20 2.00 - 8.60 10*3/mm3     Lymphocytes, Absolute 2.52 0.60 - 4.20 10*3/mm3     Monocytes, Absolute 0.73 0.00 - 0.90 10*3/mm3     Eosinophils, Absolute 0.49 0.00 - 0.70 10*3/mm3     Basophils, Absolute 0.02 0.00 - 0.20  10*3/mm3     Immature Grans, Absolute 0.04 (H) 0.00 - 0.02 10*3/mm3   CBC Auto Differential   Result Value Ref Range     WBC 11.56 (H) 3.20 - 9.80 10*3/mm3     RBC 4.44 3.77 - 5.16 10*6/mm3     Hemoglobin 12.3 12.0 - 15.5 g/dL     Hematocrit 38.7 35.0 - 45.0 %     MCV 87.2 80.0 - 98.0 fL     MCH 27.7 26.5 - 34.0 pg     MCHC 31.8 31.4 - 36.0 g/dL     RDW 14.7 (H) 11.5 - 14.5 %     RDW-SD 46.9 (H) 36.4 - 46.3 fl     MPV 10.1 8.0 - 12.0 fL     Platelets 389 150 - 450 10*3/mm3     Neutrophil % 62.5 37.0 - 80.0 %     Lymphocyte % 26.8 10.0 - 50.0 %     Monocyte % 6.5 0.0 - 12.0 %     Eosinophil % 3.6 0.0 - 7.0 %     Basophil % 0.3 0.0 - 2.0 %     Immature Grans % 0.3 0.0 - 0.5 %     Neutrophils, Absolute 7.22 2.00 - 8.60 10*3/mm3     Lymphocytes, Absolute 3.10 0.60 - 4.20 10*3/mm3     Monocytes, Absolute 0.75 0.00 - 0.90 10*3/mm3     Eosinophils, Absolute 0.42 0.00 - 0.70 10*3/mm3     Basophils, Absolute 0.04 0.00 - 0.20 10*3/mm3     Immature Grans, Absolute 0.03 (H) 0.00 - 0.02 10*3/mm3   CBC Auto Differential   Result Value Ref Range     WBC 12.16 (H) 3.20 - 9.80 10*3/mm3     RBC 4.62 3.77 - 5.16 10*6/mm3     Hemoglobin 12.9 12.0 - 15.5 g/dL     Hematocrit 38.7 35.0 - 45.0 %     MCV 83.8 80.0 - 98.0 fL     MCH 27.9 26.5 - 34.0 pg     MCHC 33.3 31.4 - 36.0 g/dL     RDW 14.3 11.5 - 14.5 %     RDW-SD 43.6 36.4 - 46.3 fl     MPV 9.7 8.0 - 12.0 fL     Platelets 418 150 - 450 10*3/mm3     Neutrophil % 72.7 37.0 - 80.0 %     Lymphocyte % 17.8 10.0 - 50.0 %     Monocyte % 6.4 0.0 - 12.0 %     Eosinophil % 2.7 0.0 - 7.0 %     Basophil % 0.2 0.0 - 2.0 %     Immature Grans % 0.2 0.0 - 0.5 %     Neutrophils, Absolute 8.83 (H) 2.00 - 8.60 10*3/mm3     Lymphocytes, Absolute 2.16 0.60 - 4.20 10*3/mm3     Monocytes, Absolute 0.78 0.00 - 0.90 10*3/mm3     Eosinophils, Absolute 0.33 0.00 - 0.70 10*3/mm3     Basophils, Absolute 0.03 0.00 - 0.20 10*3/mm3     Immature Grans, Absolute 0.03 (H) 0.00 - 0.02 10*3/mm3      *Note: Due to a large  number of results and/or encounters for the requested time period, some results have not been displayed. A complete set of results can be found in Results Review.              Office Visit on 9/18/2018            Detailed Report           -

## 2020-02-20 ENCOUNTER — READMISSION MANAGEMENT (OUTPATIENT)
Dept: CALL CENTER | Facility: HOSPITAL | Age: 60
End: 2020-02-20

## 2020-02-20 NOTE — OUTREACH NOTE
Prep Survey      Responses   Facility patient discharged from?  [facility]   Is patient eligible?  Yes   Discharge diagnosis  Chest pain d/t MI, s/p [facility] s/p CASIMIRO x2 to LAD   Does the patient have one of the following disease processes/diagnoses(primary or secondary)?  Acute MI (STEMI,NSTEMI)   Does the patient have Home health ordered?  No   Is there a DME ordered?  No   Comments regarding appointments  See AVS   Prep survey completed?  Yes          Елена Sow RN

## 2020-02-21 ENCOUNTER — READMISSION MANAGEMENT (OUTPATIENT)
Dept: CALL CENTER | Facility: HOSPITAL | Age: 60
End: 2020-02-21

## 2020-02-21 NOTE — OUTREACH NOTE
AMI Week 1 Survey      Responses   Facility patient discharged from?  [facility]   Does the patient have one of the following disease processes/diagnoses(primary or secondary)?  Acute MI (STEMI,NSTEMI)   Is there a successful TCM telephone encounter documented?  No   Week 1 attempt successful?  Yes   Call start time  0926   Rescheduled  Rescheduled-pt requested [pt sleeping]   Call end time  0927   Discharge diagnosis  Chest pain d/t MI, s/p [facility] s/p CASIMIRO x2 to LAD          Caity Urbina RN

## 2020-02-24 ENCOUNTER — TRANSCRIBE ORDERS (OUTPATIENT)
Dept: CARDIOLOGY | Facility: CLINIC | Age: 60
End: 2020-02-24

## 2020-02-24 ENCOUNTER — READMISSION MANAGEMENT (OUTPATIENT)
Dept: CALL CENTER | Facility: HOSPITAL | Age: 60
End: 2020-02-24

## 2020-02-24 DIAGNOSIS — Z98.61 POST PTCA: Primary | ICD-10-CM

## 2020-02-24 RX ORDER — HYDROCHLOROTHIAZIDE 12.5 MG/1
CAPSULE, GELATIN COATED ORAL
Qty: 90 CAPSULE | Refills: 0 | OUTPATIENT
Start: 2020-02-24

## 2020-02-24 NOTE — OUTREACH NOTE
AMI Week 1 Survey      Responses   Facility patient discharged from?  [facility]   Does the patient have one of the following disease processes/diagnoses(primary or secondary)?  Acute MI (STEMI,NSTEMI)   Is there a successful TCM telephone encounter documented?  No   Week 1 attempt successful?  No   Rescheduled  Revoked          Raymon Nava RN

## 2020-02-26 ENCOUNTER — OFFICE VISIT (OUTPATIENT)
Dept: CARDIOLOGY | Facility: CLINIC | Age: 60
End: 2020-02-26

## 2020-02-26 VITALS
SYSTOLIC BLOOD PRESSURE: 126 MMHG | WEIGHT: 254.8 LBS | DIASTOLIC BLOOD PRESSURE: 64 MMHG | BODY MASS INDEX: 38.62 KG/M2 | HEART RATE: 87 BPM | OXYGEN SATURATION: 99 % | HEIGHT: 68 IN

## 2020-02-26 DIAGNOSIS — E66.01 CLASS 3 SEVERE OBESITY DUE TO EXCESS CALORIES WITHOUT SERIOUS COMORBIDITY WITH BODY MASS INDEX (BMI) OF 40.0 TO 44.9 IN ADULT (HCC): ICD-10-CM

## 2020-02-26 DIAGNOSIS — I25.10 CORONARY ARTERY DISEASE INVOLVING NATIVE CORONARY ARTERY OF NATIVE HEART WITHOUT ANGINA PECTORIS: ICD-10-CM

## 2020-02-26 DIAGNOSIS — I27.20 PULMONARY HYPERTENSION (HCC): Primary | ICD-10-CM

## 2020-02-26 DIAGNOSIS — I36.1 NONRHEUMATIC TRICUSPID VALVE REGURGITATION: ICD-10-CM

## 2020-02-26 DIAGNOSIS — I50.32 CHRONIC HEART FAILURE WITH PRESERVED EJECTION FRACTION (HCC): ICD-10-CM

## 2020-02-26 PROCEDURE — 99214 OFFICE O/P EST MOD 30 MIN: CPT | Performed by: INTERNAL MEDICINE

## 2020-02-26 NOTE — PROGRESS NOTES
Cardiovascular Medicine   Can Kwon M.D., Ph.D., [facility]      The patient returns to cardiovascular clinic for follow-up of the following:    PROBLEM LIST:  1. HFpEF  2. PAH  3. TR  4. MV ASCAD  A. CABG, 2005  -LIMA-LAD: Prior PCI 2.5 x 28mm in 2004  -RCA, 60%  B. [facility], 6/2016  -pLAD: 50%  -Ostial D1-30%  -mLAD: 100%  -dLAD fills via LIMA  -OM1: 60%  -FFR was 0.93  C. [facility], 2/2020 with PTCI LAD  5. Risks: HTN, HLD, DM2        Ariana Martinez is a 60 y.o. female who returns to clinic today in follow-up.  She does have a history of heart failure with preserved ejection fraction and pulmonary venous hypertension.  Concerning this, she has stable dyspnea.  She does have TR.  She is noncompliant much of the time with her diet.  She is currently prescribed Toprol-XL and furosemide.  She's had emergency department visits or inpatient admissions for acute decompensated heart failure.  Weight is stable.  She denies any dizziness, lightheadedness or syncope.  She's had no PND or orthopnea.  Concerning her tricuspid regurgitation, it has remained mild. .  Concerning her coronary artery disease she is status post LIMA to the LAD by CABG in 2005.  She is currently prescribed aspirin, Plavix, Lipitor and Toprol-XL. She was admitted recently with CP. MPS was abnormal. She underwent LHC with Dr. Cooper. She is s/p PTCI. Her LIMA is not patent.  Concerning her hyperlipidemia she remains on a statin therapy.  She is medication compliant.  Denies side effects.      The following portions of the patient's history were reviewed and updated as appropriate: allergies, current medications, past family history, past medical history, past social history, past surgical history and problem list.      Review of Systems   Cardiovascular: Positive for dyspnea on exertion. Negative for chest pain, claudication, cyanosis, irregular heartbeat, leg swelling, near-syncope, orthopnea, palpitations, paroxysmal nocturnal dyspnea and syncope.      Respiratory: Positive for shortness of breath. Negative for cough, sputum production and wheezing.      family history includes Diabetes in her sister, sister, sister, sister, sister, sister and another family member; Heart disease in her mother, sister, sister, and another family member; Hypertension in her mother, sister, sister, and another family member; Stroke in her mother.   reports that she has never smoked. She has never used smokeless tobacco. She reports that she does not drink alcohol or use drugs.  Allergies   Allergen Reactions   • Seroquel [Quetiapine] Anaphylaxis   • Avandia [Rosiglitazone] Swelling   • Morphine And Related Hallucinations   • Oxycodone Hallucinations       Current Outpatient Medications:   •  ARIPiprazole (ABILIFY) 15 MG tablet, Take 1 tablet by mouth Daily., Disp: 90 tablet, Rfl: 1  •  aspirin 81 MG chewable tablet, Chew 81 mg daily., Disp: , Rfl:   •  atorvastatin (LIPITOR) 80 MG tablet, Take 1 tablet by mouth Daily., Disp: 90 tablet, Rfl: 1  •  BD SHARPS CONTAINER HOME misc, 1 each Take As Directed., Disp: 1 each, Rfl: 0  •  Blood Glucose Monitoring Suppl (ONE TOUCH ULTRA MINI) w/Device kit, USE AS DIRECTED TO CHECK BLOOD SUGAR, Disp: 1 each, Rfl: 0  •  Calcium Citrate-Vitamin D (CITRACAL/VITAMIN D) 250-200 MG-UNIT tablet, Take 2 tablets by mouth 2 (two) times a day., Disp: , Rfl:   •  clopidogrel (PLAVIX) 75 MG tablet, TAKE 1 TABLET BY MOUTH DAILY., Disp: 90 tablet, Rfl: 1  •  cyanocobalamin 1000 MCG/ML injection, INJECT 1 ML INTO THE APPROPRIATE MUSCLE AS DIRECTED BY PRESCRIBER EVERY 28 (TWENTY-EIGHT) DAYS., Disp: 1 mL, Rfl: 0  •  furosemide (LASIX) 40 MG tablet, Take 40 mg by mouth Daily., Disp: , Rfl: 3  •  gabapentin (NEURONTIN) 400 MG capsule, Take 1 capsule by mouth 3 (Three) Times a Day., Disp: 90 capsule, Rfl: 2  •  GLUCAGON EMERGENCY 1 MG injection, USE AS DIRECTED AS NEEDED, Disp: 1 kit, Rfl: 0  •  glucose blood test strip, Use to check blood sugar 4 times daily.  "accucheck, Disp: 125 each, Rfl: 12  •  hydroCHLOROthiazide (HYDRODIURIL) 12.5 MG tablet, Take 12.5 mg by mouth Daily., Disp: , Rfl:   •  HYDROcodone-acetaminophen (NORCO) 7.5-325 MG per tablet, Take 1 tablet by mouth Every 4 (Four) Hours As Needed for Moderate Pain  or Severe Pain ., Disp: 180 tablet, Rfl: 0  •  insulin aspart (novoLOG FLEXPEN) 100 UNIT/ML solution pen-injector sc pen, Inject 60 Units under the skin into the appropriate area as directed 3 (Three) Times a Day With Meals., Disp: , Rfl:   •  Insulin Glargine (BASAGLAR KWIKPEN) 100 UNIT/ML injection pen, Inject 100 Units under the skin into the appropriate area as directed Every Night., Disp: 6 pen, Rfl: 11  •  Insulin Pen Needle (B-D ULTRAFINE III SHORT PEN) 31G X 8 MM misc, USE TO INJECT 4 TIMES A DAY, Disp: 120 each, Rfl: 11  •  LORazepam (ATIVAN) 0.5 MG tablet, TAKE 1 TABLET BY MOUTH EVERY DAY AS NEEDED FOR ANXIETY, Disp: 30 tablet, Rfl: 0  •  meclizine (ANTIVERT) 25 MG tablet, Take 1 tablet by mouth 3 (Three) Times a Day As Needed for dizziness., Disp: 90 tablet, Rfl: 6  •  metoclopramide (REGLAN) 10 MG tablet, Take 10 mg by mouth 4 (Four) Times a Day As Needed., Disp: , Rfl:   •  metoprolol succinate XL (TOPROL-XL) 25 MG 24 hr tablet, TAKE 1 TABLET BY MOUTH DAILY., Disp: 31 tablet, Rfl: 6  •  mirtazapine (REMERON) 45 MG tablet, TAKE 1 TABLET BY MOUTH EVERY NIGHT., Disp: 90 tablet, Rfl: 0  •  ondansetron (ZOFRAN) 8 MG tablet, Take  by mouth Every 8 (Eight) Hours As Needed for Nausea or Vomiting., Disp: , Rfl:   •  pantoprazole (PROTONIX) 20 MG EC tablet, Take 20 mg by mouth Daily., Disp: , Rfl:   •  Syringe/Needle, Disp, (LUER LOCK SAFETY SYRINGES) 25G X 5/8\" 3 ML misc, 1 each Daily., Disp: 100 each, Rfl: 0  •  venlafaxine XR (EFFEXOR-XR) 150 MG 24 hr capsule, TAKE 1 CAPSULE BY MOUTH TWICE A DAY, Disp: 60 capsule, Rfl: 2  •  vitamin D (ERGOCALCIFEROL) 1.25 MG (23817 UT) capsule capsule, TAKE ONE CAPSULE BY MOUTH EVERY SUNDAY., Disp: 12 capsule, " Rfl: 2    Physical Exam:  Vitals:    02/26/20 1350   BP: 126/64   Pulse: 87   SpO2: 99%     Body mass index is 38.74 kg/m².   Last Weights:   Wt Readings from Last 3 Encounters:   02/19/20 116 kg (256 lb 13.4 oz)   02/10/20 113 kg (250 lb)   01/13/20 113 kg (250 lb 3.2 oz)     Physical Exam:  Vitals:    02/26/20 1350   BP: 126/64   Pulse: 87   SpO2: 99%     Body mass index is 38.74 kg/m².   General: alert, appears stated age and cooperative     Body Habitus: morbidly obese    HEENT: Head: Normocephalic, no lesions, without obvious abnormality. No arcus senilis, xanthelasma or xanthomas.  No malar flush, proptosis, xanthomas or malar flush.   JVP: Volume/Pulsation: Normal.  Normal waveforms with a, x, and v waves.   Appropriate inspiratory decrease.  No Kussmaul's. No Triny's.   Normal x and y descent.    Precordium: Normal impulses. P2 is not palpable.  RV Heave: absent  LV Heave: absent  [place]:  normal size and placement  Palpable S4: absent.  Heart rate: normal    Heart Rhythm: regular     Heart Sounds: S1: normal intensity  S2: increased intensity, increased P2  S3: absent   S4: absent  Opening Snap: absent    A2-OS:  no  Pericardial Rub:  Absent: Yes      Ejection click: None      Murmurs:  absent   Extremity: moves all extremities equally.       DATA REVIEWED:                   TTE/LAMONTE:  Results for orders placed during the hospital encounter of 07/12/19   Adult Transthoracic Echo Complete W/ Cont if Necessary Per Protocol    Narrative · Left ventricular wall thickness is consistent with mild concentric   hypertrophy.  · Estimated EF = 62%.  · Left ventricular systolic function is normal.  · Left ventricular diastolic dysfunction (grade I a) consistent with   impaired relaxation.  · Left atrial cavity size is moderately dilated.  · There is calcification of the aortic valve mainly affecting the non   coronary cusp(s).  · Tricuspid valve is grossly normal. Trace to mild tricuspid valve   regurgitation is  present. Estimated right ventricular systolic pressure   from tricuspid regurgitation is mildly elevated (35-45 mmHg). Mild   pulmonary hypertension is present.          Lab Results   Component Value Date    GLUCOSE 145 (H) 02/19/2020    BUN 17 02/19/2020    CREATININE 0.76 02/19/2020    EGFRIFNONA 78 02/19/2020    BCR 22.4 02/19/2020    K 4.2 02/19/2020    CO2 23.0 02/19/2020    CALCIUM 6.9 (L) 02/19/2020    ALBUMIN 4.10 02/16/2020    AST 13 02/16/2020    ALT 16 02/16/2020     Lab Results   Component Value Date    WBC 10.59 02/19/2020    HGB 12.2 02/19/2020    HCT 36.6 02/19/2020    MCV 88.8 02/19/2020     02/19/2020     Lab Results   Component Value Date    CHOL 115 02/19/2020    CHLPL 211 (H) 01/19/2017    TRIG 106 02/19/2020    HDL 27 (L) 02/19/2020    LDL 67 02/19/2020     Lab Results   Component Value Date    TSH 3.580 12/09/2019    V8NWQUX 9.70 08/30/2017    THYROIDAB <6 01/02/2015     Lab Results   Component Value Date    CKTOTAL 38 02/24/2018    CKMB 1.00 02/24/2018    TROPONINI <0.012 07/22/2018    TROPONINT <0.010 02/16/2020     Lab Results   Component Value Date    HGBA1C 9.40 (H) 02/19/2020     Lab Results   Component Value Date    DDIMER 722 (H) 11/11/2016     Lab Results   Component Value Date    ALT 16 02/16/2020     Lab Results   Component Value Date    HGBA1C 9.40 (H) 02/19/2020    HGBA1C 7.79 (H) 12/09/2019    HGBA1C 8.49 (H) 09/06/2019     Lab Results   Component Value Date    MICROALBUR 10.5 12/09/2019    CREATININE 0.76 02/19/2020     Lab Results   Component Value Date    IRON 105 01/13/2020    TIBC 352 01/13/2020    FERRITIN 165.00 (H) 01/13/2020     Lab Results   Component Value Date    INR 1.02 07/21/2018    INR 0.99 04/30/2018    INR 0.95 10/14/2017    PROTIME 13.2 07/21/2018    PROTIME 12.9 04/30/2018    PROTIME 12.6 10/14/2017       Assessment/Plan     1. Pulmonary hypertension (CMS/HCC). PAH/PVH. WHO Group 2; FC: II.  RV status: adapted.  Patient appears euvolemic. Perfusion  status: good.    -No current indication for PAH-specific medications  -No compelling indication at this time for RHC  -Will continue active clinical surveillance.  Signs and symptoms of worsening PAH and RV failure were discussed.  -I have asked the patient that if they were to move to be certain they seen someone with expertise in PAH. These providers can be located at [email or web address].  -TTE PTNA    2. (HFpEF) heart failure with preserved ejection fraction (CMS/HCC). NYHA stage C; FC-II.  Patient appears euvolemic. Perfusion status: good. There is not evidence of decompensation.   -Continue current HFpEF medications:  -Low sodium diet  -Weigh daily; Call for concerning weight gain or concerning symptoms    3.  Tricuspid regurgitation. ACC stage B. There are no surgical indications at this time. Signs and symptoms of worsening valve disease discussed.  I've asked the patient contact me for an earlier appointment if these develop. The patient has been advised to remain in clinical surveillance. I've recommended a repeat 2D TTE every 3 years with the next TTE due: 2023.  • No indication based on 2017 ACC/AHA guidelines for IE prophylaxis for dental procedures: Optimal oral health is recommended through regular professional dental care and the use of appropriate dental products, such as manual, powered, and ultrasonic toothbrushes; dental floss; and other plaque-removal devices    4. Coronary artery disease involving native coronary artery of native heart without angina pectoris. No anginal symptoms. The patient has been revascularized.  Revascularization:  CABG and PTCI to LAD.   -ASA  -Clopidogrel  -Toprol XL  -Atorvastatin  -Call 911 immediately for concerning symptoms.    5. Cardiac Risk Assessments based on 2019 ACCF guidelines:  A team-based care approach is recommended for the control of risk factors associated with ASCAD.  As such, Ariana Martinez was requested to have ongoing follow-up with their PCP.  A PCP can provide the detailed monitoring that is required for management of risk factors such as essential HTN. Essential HTN is a significant risk factor for stroke, heart disease and vascular disease. I've recommended the patient continue current medications, if any, as prescribed by the primary care provider. I recommended they have close follow-up for ongoing mgmt of this and the medical comorbidities associated with HTN with their PCP.  A diet emphasizing intake of vegetables, fruits, nuts, whole grains and fish is recommended. Physical activity recommendations were provided by literature. They were also provided with information regarding maintaining a healthy weight, heart-healthy dietary pattern and DASH information.  Goal blood pressure less than 130/80.  The patient's BMI is recommended to be calculated at least annually.  The patient's BMI is Body mass index is 38.74 kg/m²..  Tobacco status is assessed at every visit based on established guidelines.  The patient's nicotine status: has never smoked     Return for Next scheduled follow up.

## 2020-03-02 ENCOUNTER — APPOINTMENT (OUTPATIENT)
Dept: CARDIAC REHAB | Facility: HOSPITAL | Age: 60
End: 2020-03-02

## 2020-03-03 ENCOUNTER — OFFICE VISIT (OUTPATIENT)
Dept: FAMILY MEDICINE CLINIC | Facility: CLINIC | Age: 60
End: 2020-03-03

## 2020-03-03 VITALS
HEIGHT: 68 IN | RESPIRATION RATE: 16 BRPM | OXYGEN SATURATION: 94 % | SYSTOLIC BLOOD PRESSURE: 140 MMHG | WEIGHT: 250.3 LBS | HEART RATE: 85 BPM | TEMPERATURE: 97.4 F | DIASTOLIC BLOOD PRESSURE: 76 MMHG | BODY MASS INDEX: 37.93 KG/M2

## 2020-03-03 DIAGNOSIS — G89.29 CHRONIC RIGHT-SIDED LOW BACK PAIN WITH RIGHT-SIDED SCIATICA: Chronic | ICD-10-CM

## 2020-03-03 DIAGNOSIS — Z51.81 ENCOUNTER FOR THERAPEUTIC DRUG LEVEL MONITORING: Primary | ICD-10-CM

## 2020-03-03 DIAGNOSIS — M54.41 CHRONIC RIGHT-SIDED LOW BACK PAIN WITH RIGHT-SIDED SCIATICA: Chronic | ICD-10-CM

## 2020-03-03 PROCEDURE — 80307 DRUG TEST PRSMV CHEM ANLYZR: CPT | Performed by: NURSE PRACTITIONER

## 2020-03-03 PROCEDURE — 99214 OFFICE O/P EST MOD 30 MIN: CPT | Performed by: NURSE PRACTITIONER

## 2020-03-03 PROCEDURE — G0481 DRUG TEST DEF 8-14 CLASSES: HCPCS | Performed by: NURSE PRACTITIONER

## 2020-03-03 RX ORDER — VENLAFAXINE HYDROCHLORIDE 150 MG/1
CAPSULE, EXTENDED RELEASE ORAL
Qty: 60 CAPSULE | Refills: 2 | Status: SHIPPED | OUTPATIENT
Start: 2020-03-03 | End: 2020-05-19 | Stop reason: SDUPTHER

## 2020-03-03 RX ORDER — HYDROCODONE BITARTRATE AND ACETAMINOPHEN 7.5; 325 MG/1; MG/1
1 TABLET ORAL EVERY 6 HOURS PRN
Qty: 120 TABLET | Refills: 0 | Status: SHIPPED | OUTPATIENT
Start: 2020-03-03 | End: 2020-04-07 | Stop reason: SDUPTHER

## 2020-03-03 NOTE — PROGRESS NOTES
Chief Complaint   Patient presents with   • Follow-up     3 month     Subjective   Ariana Martinez is a 60 y.o. female who presents to the office for routine follow up for chronic pain management and chronic conditions.    The following portions of the patient's history were reviewed and updated as appropriate: allergies, current medications, past family history, past medical history, past social history, past surgical history and problem list.    History of Present Illness   UDS- Due today     Interval history: admission for chest pain to [facility] on 2/16/20-2/19/20.   Cardiac stress testing showed reversible ischemia. 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  The LAD is total from the midportion on.  The only flow given to the LAD region which also would include the apex in the inferoapical portion on a wraparound LAD is in the proximal LAD going into the diagonal branch.  This is been successfully stented with drug-eluting stents x2.  She has flow into the circumflex and right coronary artery and now except for the mid LAD portion is completely revascularized once again.  Patient has been recommended to continue with dual antiplatelet therapy for 1 year.  She is currently on aspirin and Plavix.     Type 2 Diabetes- HgbA1C on 2-19-20 9.40, worsening from previous test in December 2019.  Patient says that she is unable to afford her Dexcom machine related to cost.  However, she does have access to glucometer at home. Patient is currently taking 100units of Basaglar every night and Novolog 60 units TID with meals.  Patient sees endocrinology in [place] and is under the care of Elvis SPENCE.  Has apt next week.  Patient says average blood sugars have been on average 100-140.    Chronic low back pain: This is a chronic, ongoing issue that began over 5 years ago. The pain is in her lower back and radiates down her right side. She reports that there is no change in the pain  from her previous encounter. Patient rates pain at 4/10 today and says the lowest she experiences is a 4/10. Due for refill today, UDS due today.   Last lumbar imaging 9/12/17  IMPRESSION:  1. No acute lumbar spine abnormality  Discussed need to reduce frequency and number of hydrocodone dispensed to not more than 4 daily and not more than #120 monthly, or refer to pain management. Patient is in agreement with immediate decrease to not more than 4 daily and not more than #120 dispensed. Does not desire a pain management referral.     MAURICIO: in excess of what is controlled with mirtazepine and Effexor. Managed with a daily PRN ativan 0.5mg. This has been in use for years and is at lowest possible dosing. Reports good results and while aware of increased risk of accidental overdose or oversedation, denies any adverse effect or oversedation and desires to continue as prescribed. Not due for refill today.  Last refill obtained 2/11/2020. Will call next week for refill.    Hyperlipidemia- Lipid panel overall improved.  Lab work 2-- Total Cholesterol- 115; triglycerides- 106; HDL- 27; LDL- 67;  Patient currently on Lipitor 40mg daily.     Hypertension- Not at goal in office today, /76.  Patient is currently taking HCTZ 12.5 daily and is also on Metoprolol XL 25mg daily. Patient says she does occasionally check her BP at home with average readings of 120s systolic and 70s diastolic.      New problems today: none    HPI, ROS  and PE from most recent  Visit carried forward and updated as appropriate for current situation.    Past Medical History:   Diagnosis Date   • Angina, class IV (CMS/HCC)    • Anxiety    • Anxiety and depression    • Benign paroxysmal positional vertigo    • Bladder disorder     has bladder stimulator   • Carpal tunnel syndrome    • Chronic pain    • Coronary atherosclerosis     hx CABG 2005.  is followed by Dr Kwon   • Depression    • Diabetes mellitus (CMS/HCC)     Type 2, controlled      • Diabetic polyneuropathy (CMS/HCC)    • Elevated cholesterol    • Female stress incontinence    • Foot pain, left    • Full dentures    • Gastroparesis    • GERD (gastroesophageal reflux disease)    • Hyperlipidemia    • Hypertension    • Low back pain    • Malaise and fatigue    • Multiple joint pain    • Obesity     Refuses to be weighed   • Otalgia     Both   • Perforation of tympanic membrane     Left   • Postoperative wound infection    • Vitamin D deficiency    • Wears glasses     reading          Family History   Problem Relation Age of Onset   • Diabetes Other    • Heart disease Other    • Hypertension Other    • Heart disease Mother    • Stroke Mother    • Hypertension Mother    • Diabetes Sister    • Heart disease Sister    • Hypertension Sister    • Heart disease Sister    • Diabetes Sister    • Hypertension Sister    • Diabetes Sister    • Diabetes Sister    • Diabetes Sister    • Diabetes Sister         Review of Systems   Constitutional: Negative.  Negative for appetite change, chills, fatigue, fever and unexpected weight change.   HENT: Negative.  Negative for congestion, ear pain, rhinorrhea and sore throat.    Eyes: Negative.  Negative for pain.   Respiratory: Negative.  Negative for cough, chest tightness and shortness of breath.    Cardiovascular: Negative.  Negative for chest pain and palpitations.   Gastrointestinal: Negative.  Negative for abdominal pain, constipation, diarrhea and nausea.   Endocrine: Negative.    Genitourinary: Negative.  Negative for dysuria.   Musculoskeletal: Positive for arthralgias and back pain. Negative for joint swelling and neck pain.   Skin: Negative.  Negative for color change, pallor, rash and wound.   Allergic/Immunologic: Negative.    Neurological: Positive for numbness (BLE, chronic polyneuropathy includes feet.). Negative for dizziness and headaches.   Hematological: Negative.    Psychiatric/Behavioral: Negative for sleep disturbance and suicidal ideas. The  "patient is nervous/anxious.    All other systems reviewed and are negative.      Objective   Vitals:    03/03/20 0926   BP: 140/76   BP Location: Left arm   Patient Position: Sitting   Cuff Size: Adult   Pulse: 85   Resp: 16   Temp: 97.4 °F (36.3 °C)   TempSrc: Tympanic   SpO2: 94%   Weight: 114 kg (250 lb 4.8 oz)   Height: 172.7 cm (68\")   PainSc:   7   PainLoc: Foot  Comment: left     Physical Exam   Constitutional: She is oriented to person, place, and time. She appears well-developed and well-nourished.   HENT:   Head: Normocephalic and atraumatic.   Eyes: Pupils are equal, round, and reactive to light.   Neck: Normal range of motion. Neck supple.   Cardiovascular: Normal rate, regular rhythm and normal heart sounds.   Pulmonary/Chest: Effort normal and breath sounds normal. No respiratory distress. She has no wheezes. She has no rales.   Abdominal: Soft. Bowel sounds are normal.   Musculoskeletal: She exhibits tenderness (left foot, right lower back). She exhibits no edema.        Back:     Ariana had a diabetic foot exam performed today.   During the foot exam she had a monofilament test performed.    Neurological Sensory Findings - Unaltered hot/cold right ankle/foot discrimination and unaltered hot/cold left ankle/foot discrimination. Altered sharp/dull right ankle/foot discrimination and altered sharp/dull left ankle/foot discrimination. No right ankle/foot altered proprioception and no left ankle/foot altered proprioception  Vascular Status -  Her right foot exhibits normal foot vasculature  and no edema. Her left foot exhibits abnormal foot edema. Her left foot exhibits normal foot vasculature .  Skin Integrity  -  Her right foot skin is intact. She has callous right foot.  She has no right foot onychomycosis, no right foot ulcer, no ingrown toenail on right foot, right heel is not dry and cracked, no right foot warmth, no right foot blister and no right foot gangrenous changes.Her left foot skin is " intact.She has left foot ulcer and callous left foot. She has no left foot onychomycosis, no left ingrown toenail, no left heel dry and cracked, no left foot warmth, no left foot blister and no left foot gangrenous changes..   Foot Structure and Biomechanics -  Her right foot has no hallux valgus, no pes cavus, no claw toes, no hammer toes and no cavovarus present. Her left foot has no hallux valgus, no pes cavus, no claw toes, no hammer toes and no cavovarus.  Neurological: She is alert and oriented to person, place, and time.   Skin: Skin is warm and dry. Capillary refill takes 2 to 3 seconds.   Psychiatric: She has a normal mood and affect.   Nursing note and vitals reviewed.      Assessment/Plan   Ariana was seen today for follow-up.    Diagnoses and all orders for this visit:    Encounter for therapeutic drug level monitoring  -     ToxASSURE Select 13 Discrete -    Chronic right-sided low back pain with right-sided sciatica  Comments:  controlled with Norco  Orders:  -     HYDROcodone-acetaminophen (NORCO) 7.5-325 MG per tablet; Take 1 tablet by mouth Every 6 (Six) Hours As Needed for Moderate Pain  or Severe Pain .           PHQ-2/PHQ-9 Depression Screening 3/3/2020   Little interest or pleasure in doing things 0   Feeling down, depressed, or hopeless 0   Trouble falling or staying asleep, or sleeping too much 0   Feeling tired or having little energy 0   Poor appetite or overeating 0   Feeling bad about yourself - or that you are a failure or have let yourself or your family down 0   Trouble concentrating on things, such as reading the newspaper or watching television 0   Moving or speaking so slowly that other people could have noticed. Or the opposite - being so fidgety or restless that you have been moving around a lot more than usual 0   Thoughts that you would be better off dead, or of hurting yourself in some way 0   Total Score 0   If you checked off any problems, how difficult have these problems  made it for you to do your work, take care of things at home, or get along with other people? -   Patient understands the risks associated with this controlled medication, including tolerance and addiction.  Patient also agrees to only obtain this medication from me, and not from a another provider, unless that provider is covering for me in my absence.  Patient also agrees to be compliant in dosing, and not self adjust the dose of medication.  A signed controlled substance agreement is on file, and the patient has received a controlled substance education sheet at this a previous visit.  The patient has also signed a consent for treatment with a controlled substance as per [facility] policy. LESTER was obtained.    BEBE Palomino         Return in about 3 months (around 6/3/2020), or if symptoms worsen or fail to improve.    There are no Patient Instructions on file for this visit.    No results displayed because visit has over 200 results.      Lab on 01/13/2020   Component Date Value Ref Range Status   • Vitamin B-12 01/13/2020 763  211 - 946 pg/mL Final   • Iron 01/13/2020 105  37 - 145 mcg/dL Final   • Iron Saturation 01/13/2020 30  20 - 50 % Final   • Transferrin 01/13/2020 236  200 - 360 mg/dL Final   • TIBC 01/13/2020 352  298 - 536 mcg/dL Final   • Ferritin 01/13/2020 165.00* 13.00 - 150.00 ng/mL Final   • Folate 01/13/2020 10.80  4.78 - 24.20 ng/mL Final   • WBC 01/13/2020 11.03* 3.40 - 10.80 10*3/mm3 Final   • RBC 01/13/2020 4.84  3.77 - 5.28 10*6/mm3 Final   • Hemoglobin 01/13/2020 14.2  12.0 - 15.9 g/dL Final   • Hematocrit 01/13/2020 43.0  34.0 - 46.6 % Final   • MCV 01/13/2020 88.8  79.0 - 97.0 fL Final   • MCH 01/13/2020 29.3  26.6 - 33.0 pg Final   • MCHC 01/13/2020 33.0  31.5 - 35.7 g/dL Final   • RDW 01/13/2020 13.5  12.3 - 15.4 % Final   • RDW-SD 01/13/2020 43.8  37.0 - 54.0 fl Final   • MPV 01/13/2020 9.5  6.0 - 12.0 fL Final   • Platelets 01/13/2020 434  140 - 450 10*3/mm3 Final   •  Neutrophil % 01/13/2020 63.0  42.7 - 76.0 % Final   • Lymphocyte % 01/13/2020 25.5  19.6 - 45.3 % Final   • Monocyte % 01/13/2020 5.9  5.0 - 12.0 % Final   • Eosinophil % 01/13/2020 4.5  0.3 - 6.2 % Final   • Basophil % 01/13/2020 0.6  0.0 - 1.5 % Final   • Immature Grans % 01/13/2020 0.5  0.0 - 0.5 % Final   • Neutrophils, Absolute 01/13/2020 6.95  1.70 - 7.00 10*3/mm3 Final   • Lymphocytes, Absolute 01/13/2020 2.81  0.70 - 3.10 10*3/mm3 Final   • Monocytes, Absolute 01/13/2020 0.65  0.10 - 0.90 10*3/mm3 Final   • Eosinophils, Absolute 01/13/2020 0.50* 0.00 - 0.40 10*3/mm3 Final   • Basophils, Absolute 01/13/2020 0.07  0.00 - 0.20 10*3/mm3 Final   • Immature Grans, Absolute 01/13/2020 0.05  0.00 - 0.05 10*3/mm3 Final   • nRBC 01/13/2020 0.0  0.0 - 0.2 /100 WBC Final   Lab on 12/16/2019   Component Date Value Ref Range Status   • Glucose 12/16/2019 181* 65 - 99 mg/dL Final   • BUN 12/16/2019 9  6 - 20 mg/dL Final   • Creatinine 12/16/2019 0.93  0.57 - 1.00 mg/dL Final   • Sodium 12/16/2019 135* 136 - 145 mmol/L Final   • Potassium 12/16/2019 3.7  3.5 - 5.2 mmol/L Final   • Chloride 12/16/2019 97* 98 - 107 mmol/L Final   • CO2 12/16/2019 25.4  22.0 - 29.0 mmol/L Final   • Calcium 12/16/2019 9.5  8.6 - 10.5 mg/dL Final   • eGFR Non African Amer 12/16/2019 62  >60 mL/min/1.73 Final   • BUN/Creatinine Ratio 12/16/2019 9.7  7.0 - 25.0 Final   • Anion Gap 12/16/2019 12.6  5.0 - 15.0 mmol/L Final   Lab on 12/09/2019   Component Date Value Ref Range Status   • Iron 12/09/2019 74  37 - 145 mcg/dL Final   • Iron Saturation 12/09/2019 26  20 - 50 % Final   • Transferrin 12/09/2019 191* 200 - 360 mg/dL Final   • TIBC 12/09/2019 285* 298 - 536 mcg/dL Final   • Ferritin 12/09/2019 251.50* 13.00 - 150.00 ng/mL Final   ]

## 2020-03-07 LAB
6-ACETYLMORPHINE: NEGATIVE
6MAM SERPLBLD-MCNC: NOT DETECTED NG/MG CREAT
7-AMINOCLONAZEPAM UR: NOT DETECTED NG/MG CREAT
A-OH ALPRAZ/CREAT UR: NOT DETECTED NG/MG CREAT
ALFENTANIL UR QL: NOT DETECTED NG/MG CREAT
ALPHA-HYDROXYMIDAZOLAM, URINE: NOT DETECTED NG/MG CREAT
ALPHA-HYDROXYTRIAZOLAM, URINE: NOT DETECTED NG/MG CREAT
AMOBARBITAL UR: NOT DETECTED
AMPHET UR QL CFM: NOT DETECTED NG/MG CREAT
AMPHETAMINES UR QL SCN: NEGATIVE
BARBITAL UR QL CFM: NOT DETECTED
BARBITURATES UR QL SCN: NEGATIVE
BENZODIAZ UR QL SCN: NORMAL
BENZOYLECGONINE UR: NOT DETECTED NG/MG CREAT
BUPRENORPHINE UR QL: NEGATIVE
BUPRENORPHINE UR QL: NOT DETECTED NG/MG CREAT
BUTABARBITAL UR QL: NOT DETECTED
BUTALBITAL UR QL: NOT DETECTED
CANNABINOIDS UR QL CFM: NEGATIVE
CLONAZEPAM UR QL: NOT DETECTED NG/MG CREAT
COCAETHYLENE UR QL CFM: NOT DETECTED NG/MG CREAT
COCAINE UR QL CFM: NEGATIVE
CODEINE UR QL: NOT DETECTED NG/MG CREAT
CONV COCAINE, UR: NOT DETECTED NG/MG CREAT
CONV REPORT SUMMARY: NORMAL
CREAT 24H UR-MCNC: 130 MG/DL
DESALKYLFLURAZ/CREAT UR: NOT DETECTED NG/MG CREAT
DESMETHYLFLUNITRAZEPAM: NOT DETECTED NG/MG CREAT
DIAZEPAM UR-MCNC: NOT DETECTED NG/MG CREAT
DIHYDROCODEINE UR: 111 NG/MG CREAT
EDDP SERPL QL: NOT DETECTED NG/MG CREAT
ETHANOL SCREEN URINE (REF): NORMAL
ETHANOL UR-MCNC: >0.4 G/DL
FENTANYL UR QL: NEGATIVE
FENTANYL+NORFENTANYL UR QL SCN: NOT DETECTED NG/MG CREAT
FLUNITRAZEPAM SERPLBLD-MCNC: NOT DETECTED NG/MG CREAT
HYDROCODONE UR QL: 1078 NG/MG CREAT
HYDROMORPHONE UR QL: 168 NG/MG CREAT
LEVEL OF DETECTION:: NORMAL
LORAZEPAM UR-MCNC: 167 NG/MG CREAT
LORAZEPAM/CREAT UR: NOT DETECTED NG/MG CREAT
MDA SERPLBLD-MCNC: NOT DETECTED NG/MG CREAT
MDMA UR QL SCN: NOT DETECTED NG/MG CREAT
MEPHOBARBITAL UR QL CFM: NOT DETECTED
METHADONE BLD QL SCN: NEGATIVE
METHADONE, URINE: NOT DETECTED NG/MG CREAT
METHAMPHETAMINE UR: NOT DETECTED NG/MG CREAT
MIDAZOLAM UR-MCNC: NOT DETECTED NG/MG CREAT
MORPHINE UR QL: NOT DETECTED NG/MG CREAT
N-DESMETHYLTRAMADOL, U: NOT DETECTED NG/MG CREAT
NARCOTICS UR: NEGATIVE
NORBUPRENORPHINE SERPLBLD-MCNC: NOT DETECTED NG/MG CREAT
NORCODEINE UR-MCNC: NOT DETECTED NG/MG CREAT
NORDIAZEPAM SERPL CFM-MCNC: NOT DETECTED NG/MG CREAT
NORFENTANYL UR: NOT DETECTED NG/MG CREAT
NORMORPHINE: NOT DETECTED NG/MG CREAT
NOROXYMORPHONE: NOT DETECTED NG/MG CREAT
O-DESMETHYLTRAMADOL, UR: NOT DETECTED NG/MG CREAT
OPIATES UR QL CFM: NORMAL
OPIATES, URINE, NORHYDROCODONE: 552 NG/MG CREAT
OPIATES, URINE, NOROXYCODONE: NOT DETECTED NG/MG CREAT
OXAZEPAM UR QL: NOT DETECTED NG/MG CREAT
OXYCODONE UR QL: NEGATIVE
OXYCODONE UR: NOT DETECTED NG/MG CREAT
OXYMORPHONE UR: NOT DETECTED NG/MG CREAT
PENTOBARBITAL UR: NOT DETECTED
PHENOBARB UR QL: NOT DETECTED
SECOBARBITAL UR QL: NOT DETECTED
SUFENTANIL UR QL: NOT DETECTED NG/MG CREAT
TAPENTADOL SERPLBLD-MCNC: NEGATIVE NG/ML
TAPENTADOL UR-MCNC: NOT DETECTED NG/MG CREAT
TEMAZEPAM UR QL CFM: NOT DETECTED NG/MG CREAT
THC UR CFM-MCNC: NOT DETECTED NG/MG CREAT
THIOPENTAL UR QL CFM: NOT DETECTED
TRAMADOL UR: NOT DETECTED NG/MG CREAT

## 2020-03-09 ENCOUNTER — APPOINTMENT (OUTPATIENT)
Dept: LAB | Facility: HOSPITAL | Age: 60
End: 2020-03-09

## 2020-03-09 ENCOUNTER — OFFICE VISIT (OUTPATIENT)
Dept: PODIATRY | Facility: CLINIC | Age: 60
End: 2020-03-09

## 2020-03-09 VITALS — BODY MASS INDEX: 37.93 KG/M2 | HEIGHT: 68 IN | WEIGHT: 250.3 LBS | OXYGEN SATURATION: 100 % | HEART RATE: 83 BPM

## 2020-03-09 DIAGNOSIS — M21.172 SUBTALAR VARUS, ACQUIRED, LEFT: ICD-10-CM

## 2020-03-09 DIAGNOSIS — M72.2 PLANTAR FASCIITIS: Primary | ICD-10-CM

## 2020-03-09 DIAGNOSIS — Q66.70 PES CAVUS: ICD-10-CM

## 2020-03-09 LAB
25(OH)D3 SERPL-MCNC: 23.3 NG/ML (ref 30–100)
ALBUMIN SERPL-MCNC: 3.5 G/DL (ref 3.5–5.2)
ALBUMIN/GLOB SERPL: 1.3 G/DL
ALP SERPL-CCNC: 122 U/L (ref 39–117)
ALT SERPL W P-5'-P-CCNC: 14 U/L (ref 1–33)
ANION GAP SERPL CALCULATED.3IONS-SCNC: 14 MMOL/L (ref 5–15)
AST SERPL-CCNC: 11 U/L (ref 1–32)
BASOPHILS # BLD AUTO: 0.07 10*3/MM3 (ref 0–0.2)
BASOPHILS NFR BLD AUTO: 0.7 % (ref 0–1.5)
BILIRUB SERPL-MCNC: 0.2 MG/DL (ref 0.2–1.2)
BUN BLD-MCNC: 12 MG/DL (ref 6–20)
BUN/CREAT SERPL: 12 (ref 7–25)
CALCIUM SPEC-SCNC: 8.8 MG/DL (ref 8.6–10.5)
CHLORIDE SERPL-SCNC: 102 MMOL/L (ref 98–107)
CHOLEST SERPL-MCNC: 154 MG/DL (ref 0–200)
CO2 SERPL-SCNC: 25 MMOL/L (ref 22–29)
CREAT BLD-MCNC: 1 MG/DL (ref 0.57–1)
DEPRECATED RDW RBC AUTO: 39.4 FL (ref 37–54)
EOSINOPHIL # BLD AUTO: 0.35 10*3/MM3 (ref 0–0.4)
EOSINOPHIL NFR BLD AUTO: 3.6 % (ref 0.3–6.2)
ERYTHROCYTE [DISTWIDTH] IN BLOOD BY AUTOMATED COUNT: 12.9 % (ref 12.3–15.4)
GFR SERPL CREATININE-BSD FRML MDRD: 57 ML/MIN/1.73
GLOBULIN UR ELPH-MCNC: 2.7 GM/DL
GLUCOSE BLD-MCNC: 373 MG/DL (ref 65–99)
HBA1C MFR BLD: 9.12 % (ref 4.8–5.6)
HCT VFR BLD AUTO: 36.5 % (ref 34–46.6)
HDLC SERPL-MCNC: 42 MG/DL (ref 40–60)
HGB BLD-MCNC: 12.5 G/DL (ref 12–15.9)
IMM GRANULOCYTES # BLD AUTO: 0.03 10*3/MM3 (ref 0–0.05)
IMM GRANULOCYTES NFR BLD AUTO: 0.3 % (ref 0–0.5)
LDLC SERPL CALC-MCNC: 80 MG/DL (ref 0–100)
LDLC/HDLC SERPL: 1.9 {RATIO}
LYMPHOCYTES # BLD AUTO: 1.98 10*3/MM3 (ref 0.7–3.1)
LYMPHOCYTES NFR BLD AUTO: 20.4 % (ref 19.6–45.3)
MCH RBC QN AUTO: 29.8 PG (ref 26.6–33)
MCHC RBC AUTO-ENTMCNC: 34.2 G/DL (ref 31.5–35.7)
MCV RBC AUTO: 86.9 FL (ref 79–97)
MONOCYTES # BLD AUTO: 0.62 10*3/MM3 (ref 0.1–0.9)
MONOCYTES NFR BLD AUTO: 6.4 % (ref 5–12)
NEUTROPHILS # BLD AUTO: 6.66 10*3/MM3 (ref 1.7–7)
NEUTROPHILS NFR BLD AUTO: 68.6 % (ref 42.7–76)
NRBC BLD AUTO-RTO: 0 /100 WBC (ref 0–0.2)
PLATELET # BLD AUTO: 424 10*3/MM3 (ref 140–450)
PMV BLD AUTO: 10.2 FL (ref 6–12)
POTASSIUM BLD-SCNC: 3.5 MMOL/L (ref 3.5–5.2)
PROT SERPL-MCNC: 6.2 G/DL (ref 6–8.5)
RBC # BLD AUTO: 4.2 10*6/MM3 (ref 3.77–5.28)
SODIUM BLD-SCNC: 141 MMOL/L (ref 136–145)
TRIGL SERPL-MCNC: 160 MG/DL (ref 0–150)
TSH SERPL DL<=0.05 MIU/L-ACNC: 5.89 UIU/ML (ref 0.27–4.2)
VLDLC SERPL-MCNC: 32 MG/DL (ref 5–40)
WBC NRBC COR # BLD: 9.71 10*3/MM3 (ref 3.4–10.8)

## 2020-03-09 PROCEDURE — 80053 COMPREHEN METABOLIC PANEL: CPT | Performed by: NURSE PRACTITIONER

## 2020-03-09 PROCEDURE — 83036 HEMOGLOBIN GLYCOSYLATED A1C: CPT | Performed by: NURSE PRACTITIONER

## 2020-03-09 PROCEDURE — 80061 LIPID PANEL: CPT | Performed by: NURSE PRACTITIONER

## 2020-03-09 PROCEDURE — 82306 VITAMIN D 25 HYDROXY: CPT | Performed by: NURSE PRACTITIONER

## 2020-03-09 PROCEDURE — 20550 NJX 1 TENDON SHEATH/LIGAMENT: CPT | Performed by: PODIATRIST

## 2020-03-09 PROCEDURE — 99212 OFFICE O/P EST SF 10 MIN: CPT | Performed by: PODIATRIST

## 2020-03-09 PROCEDURE — 85025 COMPLETE CBC W/AUTO DIFF WBC: CPT | Performed by: NURSE PRACTITIONER

## 2020-03-09 PROCEDURE — 84443 ASSAY THYROID STIM HORMONE: CPT | Performed by: NURSE PRACTITIONER

## 2020-03-09 PROCEDURE — 36415 COLL VENOUS BLD VENIPUNCTURE: CPT | Performed by: NURSE PRACTITIONER

## 2020-03-09 RX ORDER — BUPIVACAINE HYDROCHLORIDE 5 MG/ML
1 INJECTION, SOLUTION EPIDURAL; INTRACAUDAL ONCE
Status: COMPLETED | OUTPATIENT
Start: 2020-03-09 | End: 2020-03-09

## 2020-03-09 RX ORDER — BETAMETHASONE SODIUM PHOSPHATE AND BETAMETHASONE ACETATE 3; 3 MG/ML; MG/ML
6 INJECTION, SUSPENSION INTRA-ARTICULAR; INTRALESIONAL; INTRAMUSCULAR; SOFT TISSUE ONCE
Status: COMPLETED | OUTPATIENT
Start: 2020-03-09 | End: 2020-03-09

## 2020-03-09 RX ADMIN — BETAMETHASONE SODIUM PHOSPHATE AND BETAMETHASONE ACETATE 6 MG: 3; 3 INJECTION, SUSPENSION INTRA-ARTICULAR; INTRALESIONAL; INTRAMUSCULAR; SOFT TISSUE at 09:00

## 2020-03-09 RX ADMIN — BUPIVACAINE HYDROCHLORIDE 1 ML: 5 INJECTION, SOLUTION EPIDURAL; INTRACAUDAL at 09:00

## 2020-03-09 NOTE — PROGRESS NOTES
Ariana Luis Martinez  1960  60 y.o. female   PCP: Kimberly Ferguson 03/03/2020  BS - 132 this morning per patient     Patient presents today for a post op follow up on the left foot.     03/09/2020        Chief Complaint   Patient presents with   • Left Foot - Follow-up       History of Present Illness    Ms Martinez is a 61 y/o diabetic female who presents for follow-up of left subtalar joint arthrodesis, hardware removal and gastrocnemius recession.  She is doing well overall.  Has transitioned back to regular shoes.  Continues to perform HEP.  Has noted a moderate increase in pain to the bottom of her left heel since last visit.    Past Medical History:   Diagnosis Date   • Angina, class IV (CMS/HCC)    • Anxiety    • Anxiety and depression    • Benign paroxysmal positional vertigo    • Bladder disorder     has bladder stimulator   • Carpal tunnel syndrome    • Chronic pain    • Coronary atherosclerosis     hx CABG 2005.  is followed by Dr Kwon   • Depression    • Diabetes mellitus (CMS/HCC)     Type 2, controlled   • Diabetic polyneuropathy (CMS/HCC)    • Elevated cholesterol    • Female stress incontinence    • Foot pain, left    • Full dentures    • Gastroparesis    • GERD (gastroesophageal reflux disease)    • Hyperlipidemia    • Hypertension    • Low back pain    • Malaise and fatigue    • Multiple joint pain    • Obesity     Refuses to be weighed   • Otalgia     Both   • Perforation of tympanic membrane     Left   • Postoperative wound infection    • Vitamin D deficiency    • Wears glasses     reading         Past Surgical History:   Procedure Laterality Date   • ABDOMINAL SURGERY     • ANGIOPLASTY      coronary   • BREAST BIOPSY Right    • CARDIAC CATHETERIZATION     • CARDIAC CATHETERIZATION N/A 6/20/2017    Procedure: Right Heart Cath;  Surgeon: Can Kwon MD PhD;  Location: [facility] INVASIVE LOCATION;  Service:    • CARDIAC CATHETERIZATION N/A 2/18/2020    Procedure: Left Heart Cath;   Surgeon: Catalina Cooper MD;  Location: [facility] CATH INVASIVE LOCATION;  Service: Cardiology;  Laterality: N/A;   • CARPAL TUNNEL RELEASE     • CHOLECYSTECTOMY     • CORONARY ARTERY BYPASS GRAFT  2005   • ENDOSCOPY N/A 10/19/2018    Procedure: ESOPHAGOGASTRODUODENOSCOPY possible dilation;  Surgeon: Julián Maldonado MD;  Location: [facility] ENDOSCOPY;  Service: Gastroenterology   • ENDOSCOPY AND COLONOSCOPY     • FOOT SURGERY      Toes   • FOOT SURGERY     • GASTRIC BANDING      Revision, laparoscopic   • HYSTERECTOMY     • MOUTH SURGERY     • SALPINGO OOPHORECTOMY     • SHOULDER SURGERY     • SUBTALAR ARTHRODESIS Left 1/16/2019    Procedure: LEFT FOOT HARDWARE REMOVAL, FIFTH METATARSAL , OPEN REDUCTION INTERNAL FIXATION, CALCANEAL OSTEOTOMY;  Surgeon: Ignacio Lord DPM;  Location: [facility] OR;  Service: Podiatry   • SUBTALAR ARTHRODESIS Left 10/16/2019    Procedure: foot hardware removal, subtalar joint fusion  possible de/reattachment of achilles tendon        (c-arm);  Surgeon: Ignacio Lord DPM;  Location: [facility] OR;  Service: Podiatry   • TRANSESOPHAGEAL ECHOCARDIOGRAM (LAMONTE)      With color flow         Family History   Problem Relation Age of Onset   • Diabetes Other    • Heart disease Other    • Hypertension Other    • Heart disease Mother    • Stroke Mother    • Hypertension Mother    • Diabetes Sister    • Heart disease Sister    • Hypertension Sister    • Heart disease Sister    • Diabetes Sister    • Hypertension Sister    • Diabetes Sister    • Diabetes Sister    • Diabetes Sister    • Diabetes Sister          Social History     Socioeconomic History   • Marital status:      Spouse name: Not on file   • Number of children: Not on file   • Years of education: Not on file   • Highest education level: Not on file   Tobacco Use   • Smoking status: Never Smoker   • Smokeless tobacco: Never Used   Substance and Sexual Activity   • Alcohol use: No   • Drug use: No   • Sexual activity: Defer          Current Outpatient Medications   Medication Sig Dispense Refill   • ARIPiprazole (ABILIFY) 15 MG tablet Take 1 tablet by mouth Daily. 90 tablet 1   • aspirin 81 MG chewable tablet Chew 81 mg daily.     • atorvastatin (LIPITOR) 80 MG tablet Take 1 tablet by mouth Daily. (Patient taking differently: Take 40 mg by mouth Daily.) 90 tablet 1   • BD SHARPS CONTAINER HOME misc 1 each Take As Directed. 1 each 0   • Blood Glucose Monitoring Suppl (ONE TOUCH ULTRA MINI) w/Device kit USE AS DIRECTED TO CHECK BLOOD SUGAR 1 each 0   • Calcium Citrate-Vitamin D (CITRACAL/VITAMIN D) 250-200 MG-UNIT tablet Take 2 tablets by mouth 2 (two) times a day.     • clopidogrel (PLAVIX) 75 MG tablet TAKE 1 TABLET BY MOUTH DAILY. 90 tablet 1   • cyanocobalamin 1000 MCG/ML injection INJECT 1 ML INTO THE APPROPRIATE MUSCLE AS DIRECTED BY PRESCRIBER EVERY 28 (TWENTY-EIGHT) DAYS. 1 mL 0   • furosemide (LASIX) 40 MG tablet Take 40 mg by mouth Daily.  3   • gabapentin (NEURONTIN) 400 MG capsule Take 1 capsule by mouth 3 (Three) Times a Day. 90 capsule 2   • GLUCAGON EMERGENCY 1 MG injection USE AS DIRECTED AS NEEDED 1 kit 0   • glucose blood test strip Use to check blood sugar 4 times daily. accucheck 125 each 12   • hydroCHLOROthiazide (HYDRODIURIL) 12.5 MG tablet Take 12.5 mg by mouth Daily.     • HYDROcodone-acetaminophen (NORCO) 7.5-325 MG per tablet Take 1 tablet by mouth Every 6 (Six) Hours As Needed for Moderate Pain  or Severe Pain . 120 tablet 0   • insulin aspart (novoLOG FLEXPEN) 100 UNIT/ML solution pen-injector sc pen Inject 60 Units under the skin into the appropriate area as directed 3 (Three) Times a Day With Meals.     • Insulin Glargine (BASAGLAR KWIKPEN) 100 UNIT/ML injection pen Inject 100 Units under the skin into the appropriate area as directed Every Night. 6 pen 11   • Insulin Pen Needle (B-D ULTRAFINE III SHORT PEN) 31G X 8 MM misc USE TO INJECT 4 TIMES A  each 11   • LORazepam (ATIVAN) 0.5 MG tablet TAKE 1  "TABLET BY MOUTH EVERY DAY AS NEEDED FOR ANXIETY 30 tablet 0   • meclizine (ANTIVERT) 25 MG tablet Take 1 tablet by mouth 3 (Three) Times a Day As Needed for dizziness. 90 tablet 6   • metoclopramide (REGLAN) 10 MG tablet Take 10 mg by mouth 4 (Four) Times a Day As Needed.     • metoprolol succinate XL (TOPROL-XL) 25 MG 24 hr tablet TAKE 1 TABLET BY MOUTH DAILY. 31 tablet 6   • mirtazapine (REMERON) 45 MG tablet TAKE 1 TABLET BY MOUTH EVERY NIGHT. 90 tablet 0   • ondansetron (ZOFRAN) 8 MG tablet Take  by mouth Every 8 (Eight) Hours As Needed for Nausea or Vomiting.     • pantoprazole (PROTONIX) 20 MG EC tablet Take 20 mg by mouth Daily.     • Syringe/Needle, Disp, (LUER LOCK SAFETY SYRINGES) 25G X 5/8\" 3 ML misc 1 each Daily. 100 each 0   • venlafaxine XR (EFFEXOR-XR) 150 MG 24 hr capsule TAKE 1 CAPSULE BY MOUTH TWICE A DAY 60 capsule 2   • vitamin D (ERGOCALCIFEROL) 1.25 MG (11799 UT) capsule capsule TAKE ONE CAPSULE BY MOUTH EVERY SUNDAY. 12 capsule 2     Current Facility-Administered Medications   Medication Dose Route Frequency Provider Last Rate Last Dose   • betamethasone acetate-betamethasone sodium phosphate (CELESTONE SOLUSPAN) injection 6 mg  6 mg Intramuscular Once Ignacio Lord DPM       • bupivacaine (PF) (MARCAINE) 0.5 % injection 1 mL  1 mL Injection Once Ignacio Lord DPM         Review of Systems   Constitutional: Negative.    HENT: Negative.    Eyes: Negative.    Respiratory: Negative.    Cardiovascular: Negative.    Gastrointestinal: Negative.    Endocrine: Negative.    Genitourinary: Negative.    Musculoskeletal: Negative.         Left foot pain    Skin: Negative.    Allergic/Immunologic: Negative.    Neurological: Positive for numbness.   Hematological: Negative.    Psychiatric/Behavioral: Negative.          OBJECTIVE    Pulse 83   Ht 172.7 cm (68\")   Wt 114 kg (250 lb 4.8 oz)   SpO2 100%   BMI 38.06 kg/m²         Physical Exam   Constitutional: she appears well-developed and " well-nourished.   Psychiatric: she has a normal mood and affect. her   behavior is normal.      Lower Extremity Exam:  Neurovascular status intact  Cap Refill time brisk   Negative Conrad  Mild edema to left ankle  Left foot and ankle incisions healed.  No erythema.  No fluctuance.    Subtalar joint rectus, rigid.  Residual pes cavus with no tenderness to palpation of plantar cuboid noted.  Mild tenderness palpation of plantar medial calcaneal tubercle, medial plantar fascial band on left.    Procedures    Left heel injection:  Risks and benefits discussed. Written consent obtained.  Skin prepped with alcohol  Medial heel injection 1cc 0.5% Marcaine, 1cc Celestone with 27g needle  Band-aid applied.  Pt tolerated well.      ASSESSMENT AND PLAN    Ariana was seen today for follow-up.    Diagnoses and all orders for this visit:    Plantar fasciitis  -     betamethasone acetate-betamethasone sodium phosphate (CELESTONE SOLUSPAN) injection 6 mg  -     bupivacaine (PF) (MARCAINE) 0.5 % injection 1 mL    Subtalar varus, acquired, left  -     XR Foot 3+ View Left    Pes cavus        -Doing well postoperatively  -Radiographs ordered and reviewed.  Stable alignment with no evidence of hardware failure noted.  -Newer onset plantar heel pain seems to be related to acute plantar fasciitis.  Corticosteroid injection as above.  -Continue diabetic shoe gear, HEP for strengthening and gait training.  -Modification of custom insoles to further offload lateral column today.    -Recheck 4 weeks          This document has been electronically signed by Ignacio Lord DPM on March 9, 2020 12:16 PM

## 2020-03-11 ENCOUNTER — OFFICE VISIT (OUTPATIENT)
Dept: ENDOCRINOLOGY | Facility: CLINIC | Age: 60
End: 2020-03-11

## 2020-03-11 ENCOUNTER — HOSPITAL ENCOUNTER (OUTPATIENT)
Dept: CARDIAC REHAB | Facility: HOSPITAL | Age: 60
Setting detail: THERAPIES SERIES
Discharge: HOME | End: 2020-03-11

## 2020-03-11 VITALS
HEART RATE: 70 BPM | OXYGEN SATURATION: 99 % | WEIGHT: 264 LBS | BODY MASS INDEX: 40.01 KG/M2 | HEIGHT: 68 IN | SYSTOLIC BLOOD PRESSURE: 126 MMHG | DIASTOLIC BLOOD PRESSURE: 72 MMHG

## 2020-03-11 VITALS
WEIGHT: 263 LBS | HEIGHT: 68 IN | SYSTOLIC BLOOD PRESSURE: 130 MMHG | HEART RATE: 65 BPM | DIASTOLIC BLOOD PRESSURE: 63 MMHG | BODY MASS INDEX: 39.86 KG/M2

## 2020-03-11 DIAGNOSIS — E78.2 MIXED HYPERLIPIDEMIA: Primary | ICD-10-CM

## 2020-03-11 DIAGNOSIS — E53.8 CYANOCOBALAMIN DEFICIENCY: ICD-10-CM

## 2020-03-11 DIAGNOSIS — IMO0002 UNCONTROLLED TYPE 2 DIABETES MELLITUS WITH NEUROLOGIC COMPLICATION, WITH LONG-TERM CURRENT USE OF INSULIN: ICD-10-CM

## 2020-03-11 DIAGNOSIS — E55.9 VITAMIN D DEFICIENCY: ICD-10-CM

## 2020-03-11 DIAGNOSIS — Z98.61 POST PTCA: Primary | ICD-10-CM

## 2020-03-11 PROCEDURE — 93798 PHYS/QHP OP CAR RHAB W/ECG: CPT

## 2020-03-11 PROCEDURE — 99214 OFFICE O/P EST MOD 30 MIN: CPT | Performed by: NURSE PRACTITIONER

## 2020-03-11 NOTE — PROGRESS NOTES
Subjective    Ariana Martinez is a 60 y.o. female. she is here today for follow-up.    History of Present Illness     Reason -diabetes       Duration/Timing: Diabetes mellitus type 2, Age at onset of diabetes: 26 years years, Onset of symptoms gradual  timing - constant     qualtiy - uncontrolled     severity - moderate     patient broke left foot   -----------------------     Severity (Complications/Hospitalizations)  Secondary Macrovascular Complications: CAD, No CVA, No PAD    2 stents placed in feb. 2020       Secondary Microvascular Complications: No Diabetic Nephropathy, No Diabetic Retinopathy, Diabetic Neuropathy     Context  Diabetes Regimen: Insulin, Oral Medications        Lab Results   Component Value Date    HGBA1C 9.12 (H) 03/09/2020                            Blood Glucose Readings     Off  dexcom sensor --- due to finances         running higher since off dexcom             Diet  variable carb intake  Exercise: Exercises  walking     Associated Signs/Symptoms  Hyperglycemic Symptoms: No polyuria, No polydipsia, No polyphagia, No weight gain  Hypoglycemic Episodes: Documented symptomatic hypoglycemia, Seizures and syncope related to hypoglycemia                 The following portions of the patient's history were reviewed and updated as appropriate:   Past Medical History:   Diagnosis Date   • Angina, class IV (CMS/HCC)    • Anxiety    • Anxiety and depression    • Benign paroxysmal positional vertigo    • Bladder disorder     has bladder stimulator   • Carpal tunnel syndrome    • Chronic pain    • Coronary atherosclerosis     hx CABG 2005.  is followed by Dr Kwon   • Depression    • Diabetes mellitus (CMS/HCC)     Type 2, controlled   • Diabetic polyneuropathy (CMS/HCC)    • Elevated cholesterol    • Female stress incontinence    • Foot pain, left    • Full dentures    • Gastroparesis    • GERD (gastroesophageal reflux disease)    • Hyperlipidemia    • Hypertension    • Low back pain    •  Malaise and fatigue    • Multiple joint pain    • Obesity     Refuses to be weighed   • Otalgia     Both   • Perforation of tympanic membrane     Left   • Postoperative wound infection    • Vitamin D deficiency    • Wears glasses     reading     Past Surgical History:   Procedure Laterality Date   • ABDOMINAL SURGERY     • ANGIOPLASTY      coronary   • BREAST BIOPSY Right    • CARDIAC CATHETERIZATION     • CARDIAC CATHETERIZATION N/A 6/20/2017    Procedure: Right Heart Cath;  Surgeon: Can Kwon MD PhD;  Location: [facility] CATH INVASIVE LOCATION;  Service:    • CARDIAC CATHETERIZATION N/A 2/18/2020    Procedure: Left Heart Cath;  Surgeon: Catalina Cooper MD;  Location: [facility] CATH INVASIVE LOCATION;  Service: Cardiology;  Laterality: N/A;   • CARPAL TUNNEL RELEASE     • CHOLECYSTECTOMY     • CORONARY ARTERY BYPASS GRAFT  2005   • ENDOSCOPY N/A 10/19/2018    Procedure: ESOPHAGOGASTRODUODENOSCOPY possible dilation;  Surgeon: Julián Maldonado MD;  Location: [facility] ENDOSCOPY;  Service: Gastroenterology   • ENDOSCOPY AND COLONOSCOPY     • FOOT SURGERY      Toes   • FOOT SURGERY     • GASTRIC BANDING      Revision, laparoscopic   • HYSTERECTOMY     • MOUTH SURGERY     • SALPINGO OOPHORECTOMY     • SHOULDER SURGERY     • SUBTALAR ARTHRODESIS Left 1/16/2019    Procedure: LEFT FOOT HARDWARE REMOVAL, FIFTH METATARSAL , OPEN REDUCTION INTERNAL FIXATION, CALCANEAL OSTEOTOMY;  Surgeon: Ignacio Lord DPM;  Location: [facility] OR;  Service: Podiatry   • SUBTALAR ARTHRODESIS Left 10/16/2019    Procedure: foot hardware removal, subtalar joint fusion  possible de/reattachment of achilles tendon        (c-arm);  Surgeon: Ignacio Lord DPM;  Location: [facility] OR;  Service: Podiatry   • TRANSESOPHAGEAL ECHOCARDIOGRAM (LAMONTE)      With color flow     Family History   Problem Relation Age of Onset   • Diabetes Other    • Heart disease Other    • Hypertension Other    • Heart disease Mother    • Stroke Mother    •  Hypertension Mother    • Diabetes Sister    • Heart disease Sister    • Hypertension Sister    • Heart disease Sister    • Diabetes Sister    • Hypertension Sister    • Diabetes Sister    • Diabetes Sister    • Diabetes Sister    • Diabetes Sister      OB History        0    Para   0    Term   0       0    AB   0    Living   0       SAB   0    TAB   0    Ectopic   0    Molar        Multiple   0    Live Births                  Current Outpatient Medications   Medication Sig Dispense Refill   • ARIPiprazole (ABILIFY) 15 MG tablet Take 1 tablet by mouth Daily. 90 tablet 1   • aspirin 81 MG chewable tablet Chew 81 mg daily.     • atorvastatin (LIPITOR) 80 MG tablet Take 1 tablet by mouth Daily. (Patient taking differently: Take 40 mg by mouth Daily.) 90 tablet 1   • BD SHARPS CONTAINER HOME misc 1 each Take As Directed. 1 each 0   • Blood Glucose Monitoring Suppl (ONE TOUCH ULTRA MINI) w/Device kit USE AS DIRECTED TO CHECK BLOOD SUGAR 1 each 0   • Calcium Citrate-Vitamin D (CITRACAL/VITAMIN D) 250-200 MG-UNIT tablet Take 2 tablets by mouth 2 (two) times a day.     • clopidogrel (PLAVIX) 75 MG tablet TAKE 1 TABLET BY MOUTH DAILY. 90 tablet 1   • cyanocobalamin 1000 MCG/ML injection INJECT 1 ML INTO THE APPROPRIATE MUSCLE AS DIRECTED BY PRESCRIBER EVERY 28 (TWENTY-EIGHT) DAYS. 1 mL 0   • furosemide (LASIX) 40 MG tablet Take 40 mg by mouth Daily.  3   • gabapentin (NEURONTIN) 400 MG capsule Take 1 capsule by mouth 3 (Three) Times a Day. 90 capsule 2   • GLUCAGON EMERGENCY 1 MG injection USE AS DIRECTED AS NEEDED 1 kit 0   • glucose blood test strip Use to check blood sugar 4 times daily. accucheck 125 each 12   • hydroCHLOROthiazide (HYDRODIURIL) 12.5 MG tablet Take 12.5 mg by mouth Daily.     • HYDROcodone-acetaminophen (NORCO) 7.5-325 MG per tablet Take 1 tablet by mouth Every 6 (Six) Hours As Needed for Moderate Pain  or Severe Pain . 120 tablet 0   • insulin aspart (novoLOG FLEXPEN) 100 UNIT/ML solution  "pen-injector sc pen Inject 60 Units under the skin into the appropriate area as directed 3 (Three) Times a Day With Meals.     • Insulin Glargine (BASAGLAR KWIKPEN) 100 UNIT/ML injection pen Inject 100 Units under the skin into the appropriate area as directed Every Night. 6 pen 11   • Insulin Pen Needle (B-D ULTRAFINE III SHORT PEN) 31G X 8 MM misc USE TO INJECT 4 TIMES A  each 11   • LORazepam (ATIVAN) 0.5 MG tablet TAKE 1 TABLET BY MOUTH EVERY DAY AS NEEDED FOR ANXIETY 30 tablet 0   • meclizine (ANTIVERT) 25 MG tablet Take 1 tablet by mouth 3 (Three) Times a Day As Needed for dizziness. 90 tablet 6   • metoclopramide (REGLAN) 10 MG tablet Take 10 mg by mouth 4 (Four) Times a Day As Needed.     • metoprolol succinate XL (TOPROL-XL) 25 MG 24 hr tablet TAKE 1 TABLET BY MOUTH DAILY. 31 tablet 6   • mirtazapine (REMERON) 45 MG tablet TAKE 1 TABLET BY MOUTH EVERY NIGHT. 90 tablet 0   • ondansetron (ZOFRAN) 8 MG tablet Take  by mouth Every 8 (Eight) Hours As Needed for Nausea or Vomiting.     • pantoprazole (PROTONIX) 20 MG EC tablet Take 20 mg by mouth Daily.     • Syringe/Needle, Disp, (LUER LOCK SAFETY SYRINGES) 25G X 5/8\" 3 ML misc 1 each Daily. 100 each 0   • venlafaxine XR (EFFEXOR-XR) 150 MG 24 hr capsule TAKE 1 CAPSULE BY MOUTH TWICE A DAY 60 capsule 2   • vitamin D (ERGOCALCIFEROL) 1.25 MG (18043 UT) capsule capsule TAKE ONE CAPSULE BY MOUTH EVERY SUNDAY. 12 capsule 2     No current facility-administered medications for this visit.      Allergies   Allergen Reactions   • Seroquel [Quetiapine] Anaphylaxis   • Avandia [Rosiglitazone] Swelling   • Morphine And Related Hallucinations   • Oxycodone Hallucinations     Social History     Socioeconomic History   • Marital status:      Spouse name: Not on file   • Number of children: Not on file   • Years of education: Not on file   • Highest education level: Not on file   Tobacco Use   • Smoking status: Never Smoker   • Smokeless tobacco: Never Used " "  Substance and Sexual Activity   • Alcohol use: No   • Drug use: No   • Sexual activity: Defer       Review of Systems  Review of Systems   Constitutional: Negative for activity change, appetite change, diaphoresis and fatigue.   HENT: Negative for facial swelling, sneezing, sore throat, tinnitus, trouble swallowing and voice change.    Eyes: Negative for photophobia, pain, discharge, redness, itching and visual disturbance.   Respiratory: Negative for apnea, cough, choking, chest tightness and shortness of breath.    Cardiovascular: Negative for chest pain, palpitations and leg swelling.   Gastrointestinal: Negative for abdominal distention, abdominal pain, constipation, diarrhea, nausea and vomiting.   Endocrine: Negative for cold intolerance, heat intolerance, polydipsia, polyphagia and polyuria.   Genitourinary: Negative for difficulty urinating, dysuria, frequency, hematuria and urgency.   Musculoskeletal: Negative for arthralgias, back pain, gait problem, joint swelling, myalgias, neck pain and neck stiffness.   Skin: Negative for color change, pallor, rash and wound.   Neurological: Negative for dizziness, tremors, weakness, light-headedness, numbness and headaches.   Hematological: Negative for adenopathy. Does not bruise/bleed easily.   Psychiatric/Behavioral: Negative for behavioral problems, confusion and sleep disturbance.        Objective    /72   Pulse 70   Ht 172.7 cm (68\")   Wt 120 kg (264 lb)   SpO2 99%   BMI 40.14 kg/m²   Physical Exam   Constitutional: She is oriented to person, place, and time. She appears well-developed and well-nourished. No distress.   HENT:   Head: Normocephalic and atraumatic.   Right Ear: External ear normal.   Left Ear: External ear normal.   Nose: Nose normal.   Eyes: Pupils are equal, round, and reactive to light. Conjunctivae and EOM are normal.   Neck: Normal range of motion. Neck supple. No tracheal deviation present. No thyromegaly present. "   Cardiovascular: Normal rate, regular rhythm and normal heart sounds.   No murmur heard.  Pulmonary/Chest: Effort normal and breath sounds normal. No respiratory distress. She has no wheezes.   Abdominal: Soft. Bowel sounds are normal. There is no tenderness. There is no rebound and no guarding.   Musculoskeletal: Normal range of motion. She exhibits no edema, tenderness or deformity.   Neurological: She is alert and oriented to person, place, and time. No cranial nerve deficit.   Skin: Skin is warm and dry. No rash noted.   Psychiatric: She has a normal mood and affect. Her behavior is normal. Judgment and thought content normal.       Lab Review  Glucose (mg/dL)   Date Value   03/09/2020 373 (H)   02/19/2020 145 (H)   02/18/2020 177 (H)     Glucose, Arterial (mmol/L)   Date Value   10/14/2017 155     Sodium (mmol/L)   Date Value   03/09/2020 141   02/19/2020 142   02/18/2020 136     Potassium (mmol/L)   Date Value   03/09/2020 3.5   02/19/2020 4.2   02/19/2020 3.0 (L)     Chloride (mmol/L)   Date Value   03/09/2020 102   02/19/2020 109 (H)   02/18/2020 97 (L)     CO2 (mmol/L)   Date Value   03/09/2020 25.0   02/19/2020 23.0   02/18/2020 27.0     BUN (mg/dL)   Date Value   03/09/2020 12   02/19/2020 17   02/18/2020 21 (H)     Creatinine (mg/dL)   Date Value   03/09/2020 1.00   02/19/2020 0.76   02/18/2020 1.07 (H)     Hemoglobin A1C (%)   Date Value   03/09/2020 9.12 (H)   02/19/2020 9.40 (H)   12/09/2019 7.79 (H)     Triglycerides (mg/dL)   Date Value   03/09/2020 160 (H)   02/19/2020 106   12/09/2019 180 (H)     LDL Cholesterol  (mg/dL)   Date Value   03/09/2020 80   02/19/2020 67   12/09/2019 125 (H)       Assessment/Plan      1. Mixed hyperlipidemia    2. Vitamin D deficiency    3. 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, with long-term current use of insulin (CMS/[facility])    .    Medications prescribed:  Outpatient Encounter Medications as of 3/11/2020   Medication Sig Dispense Refill    • ARIPiprazole (ABILIFY) 15 MG tablet Take 1 tablet by mouth Daily. 90 tablet 1   • aspirin 81 MG chewable tablet Chew 81 mg daily.     • atorvastatin (LIPITOR) 80 MG tablet Take 1 tablet by mouth Daily. (Patient taking differently: Take 40 mg by mouth Daily.) 90 tablet 1   • BD SHARPS CONTAINER HOME misc 1 each Take As Directed. 1 each 0   • Blood Glucose Monitoring Suppl (ONE TOUCH ULTRA MINI) w/Device kit USE AS DIRECTED TO CHECK BLOOD SUGAR 1 each 0   • Calcium Citrate-Vitamin D (CITRACAL/VITAMIN D) 250-200 MG-UNIT tablet Take 2 tablets by mouth 2 (two) times a day.     • clopidogrel (PLAVIX) 75 MG tablet TAKE 1 TABLET BY MOUTH DAILY. 90 tablet 1   • cyanocobalamin 1000 MCG/ML injection INJECT 1 ML INTO THE APPROPRIATE MUSCLE AS DIRECTED BY PRESCRIBER EVERY 28 (TWENTY-EIGHT) DAYS. 1 mL 0   • furosemide (LASIX) 40 MG tablet Take 40 mg by mouth Daily.  3   • gabapentin (NEURONTIN) 400 MG capsule Take 1 capsule by mouth 3 (Three) Times a Day. 90 capsule 2   • GLUCAGON EMERGENCY 1 MG injection USE AS DIRECTED AS NEEDED 1 kit 0   • glucose blood test strip Use to check blood sugar 4 times daily. accucheck 125 each 12   • hydroCHLOROthiazide (HYDRODIURIL) 12.5 MG tablet Take 12.5 mg by mouth Daily.     • HYDROcodone-acetaminophen (NORCO) 7.5-325 MG per tablet Take 1 tablet by mouth Every 6 (Six) Hours As Needed for Moderate Pain  or Severe Pain . 120 tablet 0   • insulin aspart (novoLOG FLEXPEN) 100 UNIT/ML solution pen-injector sc pen Inject 60 Units under the skin into the appropriate area as directed 3 (Three) Times a Day With Meals.     • Insulin Glargine (BASAGLAR KWIKPEN) 100 UNIT/ML injection pen Inject 100 Units under the skin into the appropriate area as directed Every Night. 6 pen 11   • Insulin Pen Needle (B-D ULTRAFINE III SHORT PEN) 31G X 8 MM misc USE TO INJECT 4 TIMES A  each 11   • LORazepam (ATIVAN) 0.5 MG tablet TAKE 1 TABLET BY MOUTH EVERY DAY AS NEEDED FOR ANXIETY 30 tablet 0   • meclizine  "(ANTIVERT) 25 MG tablet Take 1 tablet by mouth 3 (Three) Times a Day As Needed for dizziness. 90 tablet 6   • metoclopramide (REGLAN) 10 MG tablet Take 10 mg by mouth 4 (Four) Times a Day As Needed.     • metoprolol succinate XL (TOPROL-XL) 25 MG 24 hr tablet TAKE 1 TABLET BY MOUTH DAILY. 31 tablet 6   • mirtazapine (REMERON) 45 MG tablet TAKE 1 TABLET BY MOUTH EVERY NIGHT. 90 tablet 0   • ondansetron (ZOFRAN) 8 MG tablet Take  by mouth Every 8 (Eight) Hours As Needed for Nausea or Vomiting.     • pantoprazole (PROTONIX) 20 MG EC tablet Take 20 mg by mouth Daily.     • Syringe/Needle, Disp, (LUER LOCK SAFETY SYRINGES) 25G X 5/8\" 3 ML misc 1 each Daily. 100 each 0   • venlafaxine XR (EFFEXOR-XR) 150 MG 24 hr capsule TAKE 1 CAPSULE BY MOUTH TWICE A DAY 60 capsule 2   • vitamin D (ERGOCALCIFEROL) 1.25 MG (98545 UT) capsule capsule TAKE ONE CAPSULE BY MOUTH EVERY SUNDAY. 12 capsule 2     No facility-administered encounter medications on file as of 3/11/2020.        Orders placed during this encounter include:  Orders Placed This Encounter   Procedures   • Comprehensive Metabolic Panel   • Hemoglobin A1c   • Lipid Panel   • TSH   • Protein / Creatinine Ratio, Urine - Urine, Clean Catch   • Microalbumin / Creatinine Urine Ratio - Urine, Clean Catch   • Vitamin B12   • Vitamin D 25 Hydroxy   • CBC & Differential     Order Specific Question:   Manual Differential     Answer:   No     Glycemic Management    Diabetes mellitus not controlled         Lab Results   Component Value Date    HGBA1C 9.12 (H) 03/09/2020                            Dexcom sensor ---off due to finances          Basaglar taking 100 units         ==================        Novolog      Taking 60 units --        I asked her to watch her mealtime sugars if she is staying above 180 after meals then give 64 instead of 60      Discussed carb counting but states cannot     She will need to look at her meals because 30 units may not be enough for some meals and " for others meals to strong     If you eat a meal and give 30 units and your sugar is 200 or higher before the next meal look back at that meal and the next time you eat it either give more insulin or eat less     Also if you have a low after the meal give less insulin the next time you eat that meal         ==================     Jardiance 10 mg tablet , take before breakfast---will stop due to dizziness for possible volume depletion-----the dizziness has resolved since stopping jardiance        ==================     Metformin 500 mg tablets - caused diarrhea --stopped      ====================     start bydureon - stopped        Victoza stopped due to side effects      Warned of the gastric side effects she does have gastroparesis but she wants to try      Gave a sample she will let me know if she wants an RX called in      If vomiting or abdominal pain stop        januvia 100 mg daily  -stopped      ------------------------------     Lipid Management        on Lipitor   on fenofibrate                 LDL needs to be 70 or less     add zetia     Also discussed restarting the jardiance for the heart benefits but refuses    Component      Latest Ref Rng & Units 3/9/2020   Total Cholesterol      0 - 200 mg/dL 154   Triglycerides      0 - 150 mg/dL 160 (H)   HDL Cholesterol      40 - 60 mg/dL 42   LDL Cholesterol      0 - 100 mg/dL 80   VLDL Cholesterol      5 - 40 mg/dL 32   LDL/HDL Ratio       1.90              Blood Pressure Management: Control of blood pressure  on ACEi     stopped the lasix         Microvascular Complication Monitoring: Neuropathy type sensorial     Neurontin 400 mg po TID -- moderate relief but not complete        Component      Latest Ref Rng & Units 12/9/2019 12/9/2019           9:24 AM  9:24 AM   Protein/Creatinine Ratio      0.0 - 200.0 mg/G Crea 166.2     Creatinine, Urine      mg/dL 204.6 204.6   Total Protein, Urine      mg/dL 34.0     Microalbumin/Creatinine Ratio      mg/g   51.3    Microalbumin, Urine      mg/dL   10.5         intermittetly takes reglan for gastroparesis     states eye exam was March 2020              Immunization - last influenza vaccine Oct. 2019        Other Diabetes Related Aspects     TSH abnormal from July to Sept 2014     TSH in the 5 to 7 range                       Lab Results   Component Value Date     TSH 3.580 12/09/2019         Lab Results   Component Value Date    TSH 5.890 (H) 03/09/2020          ---     Continue vitamin d supplement      April 2018     Vitamin d -28      Component      Latest Ref Rng & Units 3/9/2020   25 Hydroxy, Vitamin D      30.0 - 100.0 ng/ml 23.3 (L)      missed doses            Referral to GI      Reason - diarrhea chronic      History of gastroparesis- -takes reglan              4. Follow-up: Return in about 3 months (around 6/11/2020) for Recheck.

## 2020-03-11 NOTE — PROGRESS NOTES
Cardiac Rehab Initial Assessment      Name: Ariana Martinez  :1960 Allergies:Seroquel [quetiapine]; Avandia [rosiglitazone]; Morphine and related; and Oxycodone   MRN: [id number] 60 y.o. Physician: Kimberly Ferguson APRN   Primary Diagnosis:    Diagnosis Plan   1. Post PTCA      Event Date: 20 Specialist: Kenneth Kwon   Secondary Diagnosis: none Risk Stratification:Low Risk Note Author: Kaylin Kruger RN     Cardiovascular History: Previous CABS     EXERCISE AT HOME  no  EF: 55%      Source: echo          Ambulatory Status:Independent  Ambulatory Fall Risk Assessed on Initial Visit: yes 6 Minute Walk Pre- Cardiac Rehab:  Distance:648ft      RPE:6-15  Max. HR: 88       SPO2:97    MET: 2  MPH: 1.2          RPD: 0  Resting BP: 130/63 LA, 136/55 RA    Peak BP: 171/74  Recovery BP: 169/72  Comments: Stopped at 4 minutes due to ankle pain. 7/10.       NUTRITION  Lipids:yes If yes, labs as follows;  Total: No components found for: CHOLESTEROL  HDL:   HDL Cholesterol   Date Value Ref Range Status   2020 42 40 - 60 mg/dL Final    Lipids continued:  LDL:  LDL Cholesterol    Date Value Ref Range Status   2020 80 0 - 100 mg/dL Final     Triglyceride: No components found for: TRIGLYCERIDE   Weight Management:                 Weight: 263  Height: 68                                   BMI: Body mass index is 39.99 kg/m².  Waist Circumference: 49  inches   Alcohol Use: none Diabetes:Yes,  Monitors BS at home- yes, Frequency: 4 times daily, Random BS: 138    Last HGBA1C with date if applicable:No components found for: A1C         SOCIAL HISTORY  Social History     Socioeconomic History   • Marital status:      Spouse name: Not on file   • Number of children: Not on file   • Years of education: Not on file   • Highest education level: Not on file   Tobacco Use   • Smoking status: Never Smoker   • Smokeless tobacco: Never Used   Substance and Sexual Activity   • Alcohol use:  "No   • Drug use: No   • Sexual activity: Defer       Educational Level (choose one that applies) college graduate Learning Barriers:Ready to Learn    Family Support:yes    Living Arrangement: lives with their spouse       Tobacco Adjunct: N/A           PSYCHOSOCIAL  Clinical Depression: yes  She has had anxiety and depression for years and on med on both. States \"good control\"  Stress: yes     Assess presence or absence of depression using a valid screening tool: yes      PHYSICAL ASSESSMENT  wnl          Angina: no    States left ankle pain    Diagnosed with Hypertension:yes    Heart Sounds: wnl     Lung Sounds: normal air entry, lungs clear to auscultation         Assessment: wnl Orthopedic Problems: chronic back and left ankle     Are you being hurt, hit, or frightened by anyone at home or in your life? no    Are you being neglected by a caregiver? N/A        Assessment: wnl    Family attends IA: no Time of arrival: 1300  Time of departure: 1400     Patient Goals: increase strength and stamina         3/11/2020  14:26  Kaylin Kruger RN  "

## 2020-03-12 DIAGNOSIS — F41.1 GENERALIZED ANXIETY DISORDER: ICD-10-CM

## 2020-03-12 RX ORDER — CYANOCOBALAMIN 1000 UG/ML
1000 INJECTION, SOLUTION INTRAMUSCULAR; SUBCUTANEOUS
Qty: 1 ML | Refills: 0 | Status: SHIPPED | OUTPATIENT
Start: 2020-03-12 | End: 2020-04-14 | Stop reason: SDUPTHER

## 2020-03-12 RX ORDER — LORAZEPAM 0.5 MG/1
0.5 TABLET ORAL DAILY PRN
Qty: 30 TABLET | Refills: 0 | Status: SHIPPED | OUTPATIENT
Start: 2020-03-12 | End: 2020-04-14 | Stop reason: SDUPTHER

## 2020-03-16 ENCOUNTER — TELEPHONE (OUTPATIENT)
Dept: FAMILY MEDICINE CLINIC | Facility: CLINIC | Age: 60
End: 2020-03-16

## 2020-03-16 RX ORDER — PANTOPRAZOLE SODIUM 20 MG/1
20 TABLET, DELAYED RELEASE ORAL DAILY
Qty: 30 TABLET | Refills: 3 | Status: SHIPPED | OUTPATIENT
Start: 2020-03-16 | End: 2020-03-27 | Stop reason: SDUPTHER

## 2020-03-18 ENCOUNTER — APPOINTMENT (OUTPATIENT)
Dept: CARDIAC REHAB | Facility: HOSPITAL | Age: 60
End: 2020-03-18

## 2020-03-20 ENCOUNTER — APPOINTMENT (OUTPATIENT)
Dept: CARDIAC REHAB | Facility: HOSPITAL | Age: 60
End: 2020-03-20

## 2020-03-23 ENCOUNTER — TELEPHONE (OUTPATIENT)
Dept: FAMILY MEDICINE CLINIC | Facility: CLINIC | Age: 60
End: 2020-03-23

## 2020-03-23 ENCOUNTER — APPOINTMENT (OUTPATIENT)
Dept: CARDIAC REHAB | Facility: HOSPITAL | Age: 60
End: 2020-03-23

## 2020-03-24 NOTE — TELEPHONE ENCOUNTER
[facility] CALLED FOR CHIQUITA BAILEY..SHE HAS NEW ACCU CHECK METER NOW BECAUSE OF INSURANCE AND IS NEEDING PRESP FOR THE TEST STRIPS..PLEASE PUT ON DIGN CODE AND TESTING DIRECTIONS      Can I get a written rx please?   3

## 2020-03-25 ENCOUNTER — APPOINTMENT (OUTPATIENT)
Dept: CARDIAC REHAB | Facility: HOSPITAL | Age: 60
End: 2020-03-25

## 2020-03-27 ENCOUNTER — APPOINTMENT (OUTPATIENT)
Dept: CARDIAC REHAB | Facility: HOSPITAL | Age: 60
End: 2020-03-27

## 2020-03-27 RX ORDER — PANTOPRAZOLE SODIUM 20 MG/1
20 TABLET, DELAYED RELEASE ORAL DAILY
Qty: 90 TABLET | Refills: 3 | Status: SHIPPED | OUTPATIENT
Start: 2020-03-27 | End: 2020-12-23

## 2020-03-30 ENCOUNTER — APPOINTMENT (OUTPATIENT)
Dept: CARDIAC REHAB | Facility: HOSPITAL | Age: 60
End: 2020-03-30

## 2020-04-01 ENCOUNTER — APPOINTMENT (OUTPATIENT)
Dept: CARDIAC REHAB | Facility: HOSPITAL | Age: 60
End: 2020-04-01

## 2020-04-02 RX ORDER — ONDANSETRON HYDROCHLORIDE 8 MG/1
8 TABLET, FILM COATED ORAL EVERY 8 HOURS PRN
Qty: 30 TABLET | Refills: 0 | Status: SHIPPED | OUTPATIENT
Start: 2020-04-02 | End: 2020-06-29 | Stop reason: SDUPTHER

## 2020-04-06 ENCOUNTER — APPOINTMENT (OUTPATIENT)
Dept: CARDIAC REHAB | Facility: HOSPITAL | Age: 60
End: 2020-04-06

## 2020-04-06 RX ORDER — INSULIN ASPART 100 [IU]/ML
INJECTION, SOLUTION INTRAVENOUS; SUBCUTANEOUS
Qty: 162 ML | Refills: 3 | Status: ON HOLD | OUTPATIENT
Start: 2020-04-06 | End: 2021-07-12 | Stop reason: SDUPTHER

## 2020-04-07 DIAGNOSIS — G89.29 CHRONIC RIGHT-SIDED LOW BACK PAIN WITH RIGHT-SIDED SCIATICA: Chronic | ICD-10-CM

## 2020-04-07 DIAGNOSIS — M54.41 CHRONIC RIGHT-SIDED LOW BACK PAIN WITH RIGHT-SIDED SCIATICA: Chronic | ICD-10-CM

## 2020-04-08 ENCOUNTER — APPOINTMENT (OUTPATIENT)
Dept: CARDIAC REHAB | Facility: HOSPITAL | Age: 60
End: 2020-04-08

## 2020-04-10 ENCOUNTER — TELEPHONE (OUTPATIENT)
Dept: FAMILY MEDICINE CLINIC | Facility: CLINIC | Age: 60
End: 2020-04-10

## 2020-04-10 ENCOUNTER — APPOINTMENT (OUTPATIENT)
Dept: CARDIAC REHAB | Facility: HOSPITAL | Age: 60
End: 2020-04-10

## 2020-04-10 RX ORDER — HYDROCODONE BITARTRATE AND ACETAMINOPHEN 7.5; 325 MG/1; MG/1
1 TABLET ORAL EVERY 6 HOURS PRN
Qty: 120 TABLET | Refills: 0 | Status: SHIPPED | OUTPATIENT
Start: 2020-04-10 | End: 2020-05-07 | Stop reason: SDUPTHER

## 2020-04-10 NOTE — TELEPHONE ENCOUNTER
Patient seen in office every 3 months for chronic pain requiring opiate pain medication. Compliant with medication, visits with no adverse effects noted. LESTER and UDS current and appropriate. Patient called requesting scheduled refill at appropriate interval. Patient understands the risks associated with this controlled medication, including tolerance and addiction.  Patient also agrees to only obtain this medication from me, and not from a another provider, unless that provider is covering for me in my absence.  Patient also agrees to be compliant in dosing, and not self adjust the dose of medication.  A signed controlled substance agreement is on file, and the patient has received a controlled substance education sheet at this a previous visit.  The patient has also signed a consent for treatment with a controlled substance as per [facility] policy. LESTER was obtained.   Refill sent for hydrocodone7.5mg/325mg 1 po Q6H prn pain, #120, no refill.     This document has been electronically signed by BEBE Palomino on April 10, 2020 14:03

## 2020-04-13 ENCOUNTER — APPOINTMENT (OUTPATIENT)
Dept: CARDIAC REHAB | Facility: HOSPITAL | Age: 60
End: 2020-04-13

## 2020-04-14 DIAGNOSIS — E53.8 CYANOCOBALAMIN DEFICIENCY: ICD-10-CM

## 2020-04-14 DIAGNOSIS — F41.1 GENERALIZED ANXIETY DISORDER: ICD-10-CM

## 2020-04-15 ENCOUNTER — APPOINTMENT (OUTPATIENT)
Dept: CARDIAC REHAB | Facility: HOSPITAL | Age: 60
End: 2020-04-15

## 2020-04-16 RX ORDER — LORAZEPAM 0.5 MG/1
0.5 TABLET ORAL DAILY PRN
Qty: 30 TABLET | Refills: 0 | Status: SHIPPED | OUTPATIENT
Start: 2020-04-16 | End: 2020-05-21

## 2020-04-16 RX ORDER — CYANOCOBALAMIN 1000 UG/ML
1000 INJECTION, SOLUTION INTRAMUSCULAR; SUBCUTANEOUS
Qty: 1 ML | Refills: 2 | Status: SHIPPED | OUTPATIENT
Start: 2020-04-16 | End: 2020-07-03

## 2020-04-16 NOTE — TELEPHONE ENCOUNTER
Patient seen in office every 3 months for chronic anxiety requiring benzodiazepine anxiolytic. Compliant with medication, visits with no adverse effects noted. LESTER and UDS current and appropriate. Patient called requesting scheduled refill at appropriate interval. Patient understands the risks associated with this controlled medication, including tolerance and addiction.  Patient also agrees to only obtain this medication from me, and not from a another provider, unless that provider is covering for me in my absence.  Patient also agrees to be compliant in dosing, and not self adjust the dose of medication.  A signed controlled substance agreement is on file, and the patient has received a controlled substance education sheet at this a previous visit.  The patient has also signed a consent for treatment with a controlled substance as per [facility] policy. LESTER was obtained. Refill sent for ativan 0.5 mg 1 po daily prn anxiety, #30, no refills. Patient understands the risks associated with this controlled medication, including tolerance and addiction.  Patient also agrees to only obtain this medication from me, and not from a another provider, unless that provider is covering for me in my absence.  Patient also agrees to be compliant in dosing, and not self adjust the dose of medication.  A signed controlled substance agreement is on file, and the patient has received a controlled substance education sheet at this a previous visit.  The patient has also signed a consent for treatment with a controlled substance as per [facility] policy. LESTER was obtained.      This document has been electronically signed by BEBE Palomino on April 16, 2020 16:53

## 2020-04-17 ENCOUNTER — APPOINTMENT (OUTPATIENT)
Dept: CARDIAC REHAB | Facility: HOSPITAL | Age: 60
End: 2020-04-17

## 2020-04-20 ENCOUNTER — APPOINTMENT (OUTPATIENT)
Dept: CARDIAC REHAB | Facility: HOSPITAL | Age: 60
End: 2020-04-20

## 2020-04-20 DIAGNOSIS — I10 ESSENTIAL HYPERTENSION: Chronic | ICD-10-CM

## 2020-04-20 RX ORDER — FUROSEMIDE 40 MG/1
40 TABLET ORAL DAILY
Qty: 90 TABLET | Refills: 1 | Status: SHIPPED | OUTPATIENT
Start: 2020-04-20 | End: 2020-08-31

## 2020-04-22 ENCOUNTER — APPOINTMENT (OUTPATIENT)
Dept: CARDIAC REHAB | Facility: HOSPITAL | Age: 60
End: 2020-04-22

## 2020-04-24 ENCOUNTER — APPOINTMENT (OUTPATIENT)
Dept: CARDIAC REHAB | Facility: HOSPITAL | Age: 60
End: 2020-04-24

## 2020-04-27 ENCOUNTER — APPOINTMENT (OUTPATIENT)
Dept: CARDIAC REHAB | Facility: HOSPITAL | Age: 60
End: 2020-04-27

## 2020-04-29 ENCOUNTER — APPOINTMENT (OUTPATIENT)
Dept: CARDIAC REHAB | Facility: HOSPITAL | Age: 60
End: 2020-04-29

## 2020-05-01 ENCOUNTER — APPOINTMENT (OUTPATIENT)
Dept: CARDIAC REHAB | Facility: HOSPITAL | Age: 60
End: 2020-05-01

## 2020-05-04 ENCOUNTER — APPOINTMENT (OUTPATIENT)
Dept: CARDIAC REHAB | Facility: HOSPITAL | Age: 60
End: 2020-05-04

## 2020-05-06 ENCOUNTER — APPOINTMENT (OUTPATIENT)
Dept: CARDIAC REHAB | Facility: HOSPITAL | Age: 60
End: 2020-05-06

## 2020-05-07 ENCOUNTER — TELEPHONE (OUTPATIENT)
Dept: FAMILY MEDICINE CLINIC | Facility: CLINIC | Age: 60
End: 2020-05-07

## 2020-05-07 DIAGNOSIS — M54.41 CHRONIC RIGHT-SIDED LOW BACK PAIN WITH RIGHT-SIDED SCIATICA: Chronic | ICD-10-CM

## 2020-05-07 DIAGNOSIS — G89.29 CHRONIC RIGHT-SIDED LOW BACK PAIN WITH RIGHT-SIDED SCIATICA: Chronic | ICD-10-CM

## 2020-05-07 RX ORDER — HYDROCODONE BITARTRATE AND ACETAMINOPHEN 7.5; 325 MG/1; MG/1
1 TABLET ORAL EVERY 6 HOURS PRN
Qty: 120 TABLET | Refills: 0 | Status: SHIPPED | OUTPATIENT
Start: 2020-05-07 | End: 2020-05-26 | Stop reason: SDUPTHER

## 2020-05-07 NOTE — TELEPHONE ENCOUNTER
Patient seen in office every 3 months for chronic pain requiring opiate pain medication. Compliant with medication, visits with no adverse effects noted. LESTER and UDS current and appropriate. Patient called requesting scheduled refill at appropriate interval. Patient understands the risks associated with this controlled medication, including tolerance and addiction.  Patient also agrees to only obtain this medication from me, and not from a another provider, unless that provider is covering for me in my absence.  Patient also agrees to be compliant in dosing, and not self adjust the dose of medication.  A signed controlled substance agreement is on file, and the patient has received a controlled substance education sheet at this a previous visit.  The patient has also signed a consent for treatment with a controlled substance as per [facility] policy. LESTER was obtained.   Refill sent for hydrocodone today.     This document has been electronically signed by BEBE Palomino on May 7, 2020 18:27

## 2020-05-08 ENCOUNTER — APPOINTMENT (OUTPATIENT)
Dept: CARDIAC REHAB | Facility: HOSPITAL | Age: 60
End: 2020-05-08

## 2020-05-11 ENCOUNTER — APPOINTMENT (OUTPATIENT)
Dept: CARDIAC REHAB | Facility: HOSPITAL | Age: 60
End: 2020-05-11

## 2020-05-11 DIAGNOSIS — M54.41 CHRONIC RIGHT-SIDED LOW BACK PAIN WITH RIGHT-SIDED SCIATICA: Chronic | ICD-10-CM

## 2020-05-11 DIAGNOSIS — G89.29 CHRONIC RIGHT-SIDED LOW BACK PAIN WITH RIGHT-SIDED SCIATICA: Chronic | ICD-10-CM

## 2020-05-11 RX ORDER — GABAPENTIN 400 MG/1
CAPSULE ORAL
Qty: 90 CAPSULE | Refills: 0 | Status: SHIPPED | OUTPATIENT
Start: 2020-05-11 | End: 2020-05-26 | Stop reason: SDUPTHER

## 2020-05-13 ENCOUNTER — APPOINTMENT (OUTPATIENT)
Dept: CARDIAC REHAB | Facility: HOSPITAL | Age: 60
End: 2020-05-13

## 2020-05-15 ENCOUNTER — APPOINTMENT (OUTPATIENT)
Dept: CARDIAC REHAB | Facility: HOSPITAL | Age: 60
End: 2020-05-15

## 2020-05-17 DIAGNOSIS — F41.1 GENERALIZED ANXIETY DISORDER: ICD-10-CM

## 2020-05-18 ENCOUNTER — APPOINTMENT (OUTPATIENT)
Dept: CARDIAC REHAB | Facility: HOSPITAL | Age: 60
End: 2020-05-18

## 2020-05-19 RX ORDER — VENLAFAXINE HYDROCHLORIDE 150 MG/1
150 CAPSULE, EXTENDED RELEASE ORAL 2 TIMES DAILY
Qty: 90 CAPSULE | Refills: 1 | Status: SHIPPED | OUTPATIENT
Start: 2020-05-19 | End: 2020-08-10

## 2020-05-20 ENCOUNTER — APPOINTMENT (OUTPATIENT)
Dept: CARDIAC REHAB | Facility: HOSPITAL | Age: 60
End: 2020-05-20

## 2020-05-21 ENCOUNTER — TELEPHONE (OUTPATIENT)
Dept: FAMILY MEDICINE CLINIC | Facility: CLINIC | Age: 60
End: 2020-05-21

## 2020-05-21 RX ORDER — LORAZEPAM 0.5 MG/1
TABLET ORAL
Qty: 30 TABLET | Refills: 0 | Status: SHIPPED | OUTPATIENT
Start: 2020-05-21 | End: 2020-07-02 | Stop reason: SDUPTHER

## 2020-05-21 NOTE — TELEPHONE ENCOUNTER
Patient seen in office every 3 months for chronic anxiety requiring benzodiazepine anxiolytic. Compliant with medication, visits with no adverse effects noted. LESTER and UDS current and appropriate. Patient called requesting scheduled refill at appropriate interval. Patient understands the risks associated with this controlled medication, including tolerance and addiction.  Patient also agrees to only obtain this medication from me, and not from a another provider, unless that provider is covering for me in my absence.  Patient also agrees to be compliant in dosing, and not self adjust the dose of medication.  A signed controlled substance agreement is on file, and the patient has received a controlled substance education sheet at this a previous visit.  The patient has also signed a consent for treatment with a controlled substance as per [facility] policy. LESTER was obtained. Refill sent for lorazepam.     This document has been electronically signed by BEBE Palomino on May 21, 2020 08:12

## 2020-05-22 ENCOUNTER — APPOINTMENT (OUTPATIENT)
Dept: CARDIAC REHAB | Facility: HOSPITAL | Age: 60
End: 2020-05-22

## 2020-05-22 ENCOUNTER — LAB (OUTPATIENT)
Dept: ONCOLOGY | Facility: HOSPITAL | Age: 60
End: 2020-05-22

## 2020-05-22 DIAGNOSIS — E61.1 IRON DEFICIENCY: ICD-10-CM

## 2020-05-22 DIAGNOSIS — T45.4X5A ADVERSE EFFECT OF IRON, INITIAL ENCOUNTER: ICD-10-CM

## 2020-05-22 DIAGNOSIS — D72.828 OTHER ELEVATED WHITE BLOOD CELL (WBC) COUNT: ICD-10-CM

## 2020-05-22 LAB
BASOPHILS # BLD AUTO: 0.06 10*3/MM3 (ref 0–0.2)
BASOPHILS NFR BLD AUTO: 0.6 % (ref 0–1.5)
DEPRECATED RDW RBC AUTO: 41.4 FL (ref 37–54)
EOSINOPHIL # BLD AUTO: 0.33 10*3/MM3 (ref 0–0.4)
EOSINOPHIL NFR BLD AUTO: 3.2 % (ref 0.3–6.2)
ERYTHROCYTE [DISTWIDTH] IN BLOOD BY AUTOMATED COUNT: 13.3 % (ref 12.3–15.4)
FERRITIN SERPL-MCNC: 110.6 NG/ML (ref 13–150)
FOLATE SERPL-MCNC: 11.9 NG/ML (ref 4.78–24.2)
HCT VFR BLD AUTO: 38.7 % (ref 34–46.6)
HGB BLD-MCNC: 12.9 G/DL (ref 12–15.9)
IMM GRANULOCYTES # BLD AUTO: 0.04 10*3/MM3 (ref 0–0.05)
IMM GRANULOCYTES NFR BLD AUTO: 0.4 % (ref 0–0.5)
IRON 24H UR-MRATE: 56 MCG/DL (ref 37–145)
IRON SATN MFR SERPL: 16 % (ref 20–50)
LYMPHOCYTES # BLD AUTO: 2.3 10*3/MM3 (ref 0.7–3.1)
LYMPHOCYTES NFR BLD AUTO: 22.3 % (ref 19.6–45.3)
MCH RBC QN AUTO: 28.6 PG (ref 26.6–33)
MCHC RBC AUTO-ENTMCNC: 33.3 G/DL (ref 31.5–35.7)
MCV RBC AUTO: 85.8 FL (ref 79–97)
MONOCYTES # BLD AUTO: 0.63 10*3/MM3 (ref 0.1–0.9)
MONOCYTES NFR BLD AUTO: 6.1 % (ref 5–12)
NEUTROPHILS # BLD AUTO: 6.96 10*3/MM3 (ref 1.7–7)
NEUTROPHILS NFR BLD AUTO: 67.4 % (ref 42.7–76)
NRBC BLD AUTO-RTO: 0 /100 WBC (ref 0–0.2)
PLATELET # BLD AUTO: 364 10*3/MM3 (ref 140–450)
PMV BLD AUTO: 9.5 FL (ref 6–12)
RBC # BLD AUTO: 4.51 10*6/MM3 (ref 3.77–5.28)
TIBC SERPL-MCNC: 340 MCG/DL (ref 298–536)
TRANSFERRIN SERPL-MCNC: 228 MG/DL (ref 200–360)
VIT B12 BLD-MCNC: 526 PG/ML (ref 211–946)
WBC NRBC COR # BLD: 10.32 10*3/MM3 (ref 3.4–10.8)

## 2020-05-22 PROCEDURE — 85025 COMPLETE CBC W/AUTO DIFF WBC: CPT | Performed by: INTERNAL MEDICINE

## 2020-05-22 PROCEDURE — 82746 ASSAY OF FOLIC ACID SERUM: CPT | Performed by: INTERNAL MEDICINE

## 2020-05-22 PROCEDURE — 82607 VITAMIN B-12: CPT | Performed by: INTERNAL MEDICINE

## 2020-05-22 PROCEDURE — 36415 COLL VENOUS BLD VENIPUNCTURE: CPT | Performed by: NURSE PRACTITIONER

## 2020-05-22 PROCEDURE — 83540 ASSAY OF IRON: CPT | Performed by: INTERNAL MEDICINE

## 2020-05-22 PROCEDURE — 84466 ASSAY OF TRANSFERRIN: CPT | Performed by: INTERNAL MEDICINE

## 2020-05-22 PROCEDURE — 82728 ASSAY OF FERRITIN: CPT | Performed by: INTERNAL MEDICINE

## 2020-05-26 ENCOUNTER — OFFICE VISIT (OUTPATIENT)
Dept: FAMILY MEDICINE CLINIC | Facility: CLINIC | Age: 60
End: 2020-05-26

## 2020-05-26 DIAGNOSIS — Z51.81 ENCOUNTER FOR THERAPEUTIC DRUG LEVEL MONITORING: Primary | ICD-10-CM

## 2020-05-26 DIAGNOSIS — G89.29 CHRONIC RIGHT-SIDED LOW BACK PAIN WITH RIGHT-SIDED SCIATICA: Chronic | ICD-10-CM

## 2020-05-26 DIAGNOSIS — M54.41 CHRONIC RIGHT-SIDED LOW BACK PAIN WITH RIGHT-SIDED SCIATICA: Chronic | ICD-10-CM

## 2020-05-26 PROCEDURE — 99442 PR PHYS/QHP TELEPHONE EVALUATION 11-20 MIN: CPT | Performed by: NURSE PRACTITIONER

## 2020-05-26 RX ORDER — HYDROCODONE BITARTRATE AND ACETAMINOPHEN 7.5; 325 MG/1; MG/1
1 TABLET ORAL EVERY 6 HOURS PRN
Qty: 120 TABLET | Refills: 0 | Status: SHIPPED | OUTPATIENT
Start: 2020-05-26 | End: 2020-06-29 | Stop reason: SDUPTHER

## 2020-05-26 RX ORDER — GABAPENTIN 400 MG/1
400 CAPSULE ORAL 3 TIMES DAILY
Qty: 90 CAPSULE | Refills: 2 | Status: SHIPPED | OUTPATIENT
Start: 2020-05-26 | End: 2020-08-20 | Stop reason: SDUPTHER

## 2020-05-26 NOTE — PROGRESS NOTES
Chief Complaint   Patient presents with   • Back Pain   • Anxiety     Subjective   Ariana Martinez is a 60 y.o. female who presents to the office by telephone visit due to pandemic, for routine follow up of chronic lower back pain and diabetic polyneuropathy of feet.     The following portions of the patient's history were reviewed and updated as appropriate: allergies, current medications, past family history, past medical history, past social history, past surgical history and problem list.    History of Present Illness   You have chosen to receive care through a telephone visit. Do you consent to use a telephone visit for your medical care today? Yes  12 minutes medical discussion.    Diabetic polyneuropathy: usually managed well with gabapentin, due for refill today. Is experiencing significant swelling and increased pain left foot recently and today. Has had surgery on this foot earlier this year with Dr Lord, scheduled to see him again Monday.     Chronic low back pain with right sided sciatica: This is a chronic, ongoing issue that began over 5 years ago. The pain is in her lower back and radiates down her right side. She reports that there is no change in the pain from her previous encounter. Patient rates pain at 4/10 today and says the lowest she experiences is a 4/10. Managed adequately with hydrocodone and gabapentin. Due for refill today, UDS due today.   Last lumbar imaging 9/12/17  IMPRESSION:  1. No acute lumbar spine abnormality     MAURICIO: in excess of what is controlled with mirtazepine and Effexor. Managed with a daily PRN ativan 0.5mg. This has been in use for years and is at lowest possible dosing. Reports good results and while aware of increased risk of accidental overdose or oversedation, denies any adverse effect or oversedation and desires to continue as prescribed. Not due for refill today. Will call when refill due.   Parts of most recent relevant visit HPI, ROS  and PE may be carried  forward and all are updated as appropriate for current situation.    Past Medical History:   Diagnosis Date   • Angina, class IV (CMS/HCC)    • Anxiety    • Anxiety and depression    • Benign paroxysmal positional vertigo    • Bladder disorder     has bladder stimulator   • Carpal tunnel syndrome    • Chronic pain    • Coronary atherosclerosis     hx CABG 2005.  is followed by Dr Kwon   • Depression    • Diabetes mellitus (CMS/[facility])     Type 2, controlled   • Diabetic polyneuropathy (CMS/[facility])    • Elevated cholesterol    • Female stress incontinence    • Foot pain, left    • Full dentures    • Gastroparesis    • GERD (gastroesophageal reflux disease)    • Hyperlipidemia    • Hypertension    • Low back pain    • Malaise and fatigue    • Multiple joint pain    • Obesity     Refuses to be weighed   • Otalgia     Both   • Perforation of tympanic membrane     Left   • Postoperative wound infection    • Vitamin D deficiency    • Wears glasses     reading          Family History   Problem Relation Age of Onset   • Diabetes Other    • Heart disease Other    • Hypertension Other    • Heart disease Mother    • Stroke Mother    • Hypertension Mother    • Diabetes Sister    • Heart disease Sister    • Hypertension Sister    • Heart disease Sister    • Diabetes Sister    • Hypertension Sister    • Diabetes Sister    • Diabetes Sister    • Diabetes Sister    • Diabetes Sister         Review of Systems   Constitutional: Negative.  Negative for appetite change, chills, fatigue, fever and unexpected weight change.   HENT: Negative.  Negative for congestion, ear pain, rhinorrhea and sore throat.    Eyes: Negative.  Negative for pain.   Respiratory: Negative.  Negative for cough, chest tightness and shortness of breath.    Cardiovascular: Negative.  Negative for chest pain and palpitations.   Gastrointestinal: Negative.  Negative for abdominal pain, constipation, diarrhea and nausea.   Endocrine: Negative.    Genitourinary:  Negative.  Negative for dysuria.   Musculoskeletal: Positive for arthralgias and back pain. Negative for joint swelling and neck pain.   Skin: Negative.  Negative for color change, pallor, rash and wound.   Allergic/Immunologic: Negative.    Neurological: Positive for numbness (BLE, chronic polyneuropathy includes feet.). Negative for dizziness and headaches.   Hematological: Negative.    Psychiatric/Behavioral: Negative for sleep disturbance and suicidal ideas. The patient is nervous/anxious.    All other systems reviewed and are negative.      Objective   Vitals:    05/26/20 1101   PainSc:   8   PainLoc: Foot     Physical Exam   Constitutional: She is oriented to person, place, and time. No distress.   Pulmonary/Chest: Effort normal. No stridor. No respiratory distress.   Neurological: She is oriented to person, place, and time.   Psychiatric: She has a normal mood and affect. Her behavior is normal. Judgment and thought content normal.       Assessment/Plan   Ariana was seen today for back pain and anxiety.    Diagnoses and all orders for this visit:    Encounter for therapeutic drug level monitoring  -     ToxASSURE Select 13 Discrete -  -     Gabapentin, Urine -    Chronic right-sided low back pain with right-sided sciatica  Comments:  controlled with Norco  Orders:  -     gabapentin (NEURONTIN) 400 MG capsule; Take 1 capsule by mouth 3 (Three) Times a Day.  -     HYDROcodone-acetaminophen (Norco) 7.5-325 MG per tablet; Take 1 tablet by mouth Every 6 (Six) Hours As Needed for Moderate Pain  or Severe Pain .           PHQ-2/PHQ-9 Depression Screening 3/3/2020   Little interest or pleasure in doing things 0   Feeling down, depressed, or hopeless 0   Trouble falling or staying asleep, or sleeping too much 0   Feeling tired or having little energy 0   Poor appetite or overeating 0   Feeling bad about yourself - or that you are a failure or have let yourself or your family down 0   Trouble concentrating on things,  such as reading the newspaper or watching television 0   Moving or speaking so slowly that other people could have noticed. Or the opposite - being so fidgety or restless that you have been moving around a lot more than usual 0   Thoughts that you would be better off dead, or of hurting yourself in some way 0   Total Score 0   If you checked off any problems, how difficult have these problems made it for you to do your work, take care of things at home, or get along with other people? -   Patient has chronic anxiety requiring benzodiazepine use concurrently with chronic pain requiring opiate pain medication and/ or gabapentin. Patient has been educated regarding potential dangers of oversedation and accidental overdose with use of both medications concurrently. Serial assessments of patient has yielded no sign of oversedation or adverse effects of patient, and he/she is advised and aware to notify my office immediately if symptoms do occur, as well as to discontinue use of benzodiazepine medication and opiate medication immediately if that occurs, pending medical evaluation and advice. Patient has been compliant with use of medications, UDS, visits with no adverse effects noted. Patient understands the risks associated with this controlled medication, including tolerance and addiction.  Patient also agrees to only obtain this medication from me, and not from a another provider, unless that provider is covering for me in my absence.  Patient also agrees to be compliant in dosing, and not self adjust the dose of medication.  A signed controlled substance agreement is on file, and the patient has received a controlled substance education sheet at this a previous visit.  The patient has also signed a consent for treatment with a controlled substance as per [facility] policy. LESTER was obtained.    BEBE Palomino         Return in about 12 weeks (around 8/18/2020).    There are no Patient Instructions on file  for this visit.

## 2020-05-27 ENCOUNTER — APPOINTMENT (OUTPATIENT)
Dept: CARDIAC REHAB | Facility: HOSPITAL | Age: 60
End: 2020-05-27

## 2020-05-27 ENCOUNTER — TELEPHONE (OUTPATIENT)
Dept: CARDIAC REHAB | Facility: HOSPITAL | Age: 60
End: 2020-05-27

## 2020-05-27 NOTE — TELEPHONE ENCOUNTER
I called Ms. Martinez to discuss scheduling her for Cardiac Rehab upon our reopening 6/1/2020.  She states that she sees her foot doctor on 6/1/2020 and will contact us after she finds out wheat he is planning to do about her foot.

## 2020-05-28 ENCOUNTER — OFFICE VISIT (OUTPATIENT)
Dept: ONCOLOGY | Facility: CLINIC | Age: 60
End: 2020-05-28

## 2020-05-28 DIAGNOSIS — E61.1 IRON DEFICIENCY: Primary | ICD-10-CM

## 2020-05-28 PROCEDURE — 99442 PR PHYS/QHP TELEPHONE EVALUATION 11-20 MIN: CPT | Performed by: NURSE PRACTITIONER

## 2020-05-28 RX ORDER — SODIUM CHLORIDE 9 MG/ML
250 INJECTION, SOLUTION INTRAVENOUS ONCE
Status: CANCELLED | OUTPATIENT
Start: 2020-05-28

## 2020-05-28 NOTE — PROGRESS NOTES
You have chosen to receive care through a telephone visit. Do you consent to use a telephone visit for your medical care today? Yes    This visit has been rescheduled as a phone visit to comply with patient safety concerns in accordance with CDC recommendations. Total time of discussion was 12 minutes.      Diagnosis: Iron deficiency anemia; status post iron infusion.    History of present illness:    60-year-old female with medical problem consisting of coronary artery disease status post CABG, type 2 diabetes mellitus with neuropathy affecting upper and lower extremity, gastroparesis, dyslipidemia, obesity status post lap banding and reversal around 2014 was initially seen in consultation on September 30, 2019 for evaluation of leukocytosis and thrombocytosis.  Due to iron deficiency and inability to tolerate iron by mouth, patient received 2 dose of intravenous Feraheme on October 23, 2019 and October 30, 2019.      Patient is having telemedicine visit today to review and discuss anemia labs.  States she has noticed she is becoming more fatigued. Denies any bleeding. Denies any new lymph node enlargement.    Review of Systems   Constitutional: Positive for fatigue.   HENT: Negative.    Eyes: Negative.    Respiratory: Negative.    Cardiovascular: Negative.    Gastrointestinal: Negative.    Endocrine: Negative.    Genitourinary: Negative.    Musculoskeletal: Negative.    Neurological: Negative.    Hematological: Negative.    Psychiatric/Behavioral: Negative.        Physical Exam   Constitutional: She appears well-developed and well-nourished.   HENT:   Head: Normocephalic and atraumatic.   Eyes: Pupils are equal, round, and reactive to light. Conjunctivae are normal.   Neck: Neck normal appearance.  Pulmonary/Chest: Effort normal.   Abdominal: Abdomen appears normal. Soft.   Neurological: She is alert.   Skin: Skin is warm and dry.   Psychiatric: She has a normal mood and affect.         Tony 2 mutation including  exon 12, CALR, MPL mutation was negative on peripheral blood on September 30, 2019.        Component      Latest Ref Rng & Units 5/22/2020             WBC      3.40 - 10.80 10*3/mm3 10.32   RBC      3.77 - 5.28 10*6/mm3 4.51   Hemoglobin      12.0 - 15.9 g/dL 12.9   Hematocrit      34.0 - 46.6 % 38.7   MCV      79.0 - 97.0 fL 85.8   MCH      26.6 - 33.0 pg 28.6   MCHC      31.5 - 35.7 g/dL 33.3   RDW      12.3 - 15.4 % 13.3   Platelets      140 - 450 10*3/mm3 364   MPV      6.0 - 12.0 fL 9.5   Neutrophil Rel %      42.7 - 76.0 % 67.4   Lymphocyte Rel %      19.6 - 45.3 % 22.3   Monocyte Rel %      5.0 - 12.0 % 6.1   Eosinophil Rel %      0.3 - 6.2 % 3.2   Basophil Rel %      0.0 - 1.5 % 0.6   Immature Granulocyte Rel %      0.0 - 0.5 % 0.4   Neutrophils Absolute      1.70 - 7.00 10*3/mm3 6.96   Lymphocytes Absolute      0.70 - 3.10 10*3/mm3 2.30   Monocytes Absolute      0.10 - 0.90 10*3/mm3 0.63   Eosinophils Absolute      0.00 - 0.40 10*3/mm3 0.33   Basophils Absolute      0.00 - 0.20 10*3/mm3 0.06   Immature Grans, Absolute      0.00 - 0.05 10*3/mm3 0.04   nRBC      0.0 - 0.2 /100 WBC 0.0   RDW-SD      37.0 - 54.0 fl 41.4        Component      Latest Ref Rng & Units 5/22/2020   Iron      37 - 145 mcg/dL 56   Iron Saturation      20 - 50 % 16 (L)   Transferrin      200 - 360 mg/dL 228   TIBC      298 - 536 mcg/dL 340        ASSESSMENT AND PLAN:       1.  Thrombocytosis:  - Patient has intermittent thrombocytosis since 2015.  -Platelet count done 5/22/2020 was normal at 364,000.  - Extensive work-up including Tony 2 mutation with exon 12, CALR mutation, MPL mutation done on September 30, 2019 is negative.  - At this point will continue with clinical monitoring.  Will ask patient to return to clinic in 3 months with repeat CBC and anemia work-up to be done     2.  Leukocytosis:  - Patient has persistent leukocytosis since 2015.  -White blood cell count improved to 10,000.  - CANDICE R mutation, MPL mutation, Tony 2  mutation including exon 12 were negative.  At this point will continue with clinical monitoring.  - If patient has any worsening leukocytosis or thrombocytosis in future, she will need bone marrow biopsy which was discussed with patient.     3.  Iron deficiency:  -Patient is found to have iron deficiency with iron saturation of only 11% and ferritin of 46.  Patient states in the past she is not able to tolerate iron by mouth.  -Patient received 2 dose of intravenous Feraheme on October 23, 2019 and October 30 2019.  -Iron studies done 5/22/2020 shows normal hemoglobin but iron saturation along with ferritin are dropping. Will also refer her back to Dr. Hernandez who she has seen in past in GI clinic.  -Will give 2 doses of IV iron. Will repeat labs again in 3 mos.  -Patient remains on monthly B12 injection by primary medical provider.    4.  Health maintenance: Patient does not smoke.  Had a mammogram last year.  Had a colonoscopy around 2017.  Had a hysterectomy and does not need Pap smear

## 2020-05-29 ENCOUNTER — APPOINTMENT (OUTPATIENT)
Dept: CARDIAC REHAB | Facility: HOSPITAL | Age: 60
End: 2020-05-29

## 2020-06-01 ENCOUNTER — TELEPHONE (OUTPATIENT)
Dept: PODIATRY | Facility: CLINIC | Age: 60
End: 2020-06-01

## 2020-06-01 ENCOUNTER — APPOINTMENT (OUTPATIENT)
Dept: CARDIAC REHAB | Facility: HOSPITAL | Age: 60
End: 2020-06-01

## 2020-06-01 ENCOUNTER — OFFICE VISIT (OUTPATIENT)
Dept: PODIATRY | Facility: CLINIC | Age: 60
End: 2020-06-01

## 2020-06-01 VITALS — HEART RATE: 88 BPM | HEIGHT: 68 IN | BODY MASS INDEX: 39.86 KG/M2 | WEIGHT: 263 LBS | OXYGEN SATURATION: 98 %

## 2020-06-01 DIAGNOSIS — M79.89 LEG SWELLING: ICD-10-CM

## 2020-06-01 DIAGNOSIS — M21.172 SUBTALAR VARUS, ACQUIRED, LEFT: Primary | ICD-10-CM

## 2020-06-01 DIAGNOSIS — M96.0 NONUNION OF SUBTALAR ARTHRODESIS: ICD-10-CM

## 2020-06-01 PROCEDURE — 99213 OFFICE O/P EST LOW 20 MIN: CPT | Performed by: PODIATRIST

## 2020-06-01 RX ORDER — METOPROLOL SUCCINATE 25 MG/1
TABLET, EXTENDED RELEASE ORAL
Qty: 31 TABLET | Refills: 6 | Status: SHIPPED | OUTPATIENT
Start: 2020-06-01 | End: 2021-05-24 | Stop reason: DRUGHIGH

## 2020-06-01 NOTE — PROGRESS NOTES
Ariana Luis Martinez  1960  60 y.o. female   PCP: Kimberly Ferguson 05/26/2020  BS - 168 per patient     Patient presents today for a post op follow up on the left foot.     06/01/2020          Chief Complaint   Patient presents with   • Left Foot - Follow-up, Pain, Edema       History of Present Illness    Ms Martinez is a 59 y/o diabetic female who presents for follow-up of left subtalar joint arthrodesis, hardware removal and gastrocnemius recession.  Has noted an increase in swelling and pain to left heel and ankle since last visit.    Past Medical History:   Diagnosis Date   • Angina, class IV (CMS/HCC)    • Anxiety    • Anxiety and depression    • Benign paroxysmal positional vertigo    • Bladder disorder     has bladder stimulator   • Carpal tunnel syndrome    • Chronic pain    • Coronary atherosclerosis     hx CABG 2005.  is followed by Dr Kwon   • Depression    • Diabetes mellitus (CMS/HCC)     Type 2, controlled   • Diabetic polyneuropathy (CMS/[facility])    • Elevated cholesterol    • Female stress incontinence    • Foot pain, left    • Full dentures    • Gastroparesis    • GERD (gastroesophageal reflux disease)    • Hyperlipidemia    • Hypertension    • Low back pain    • Malaise and fatigue    • Multiple joint pain    • Obesity     Refuses to be weighed   • Otalgia     Both   • Perforation of tympanic membrane     Left   • Postoperative wound infection    • Vitamin D deficiency    • Wears glasses     reading         Past Surgical History:   Procedure Laterality Date   • ABDOMINAL SURGERY     • ANGIOPLASTY      coronary   • BREAST BIOPSY Right    • CARDIAC CATHETERIZATION     • CARDIAC CATHETERIZATION N/A 6/20/2017    Procedure: Right Heart Cath;  Surgeon: Can Kwon MD PhD;  Location: [facility] INVASIVE LOCATION;  Service:    • CARDIAC CATHETERIZATION N/A 2/18/2020    Procedure: Left Heart Cath;  Surgeon: Catalina Cooper MD;  Location: [facility] INVASIVE LOCATION;  Service: Cardiology;   Laterality: N/A;   • CARPAL TUNNEL RELEASE     • CHOLECYSTECTOMY     • CORONARY ARTERY BYPASS GRAFT  2005   • ENDOSCOPY N/A 10/19/2018    Procedure: ESOPHAGOGASTRODUODENOSCOPY possible dilation;  Surgeon: Julián Maldonado MD;  Location: [facility] ENDOSCOPY;  Service: Gastroenterology   • ENDOSCOPY AND COLONOSCOPY     • FOOT SURGERY      Toes   • FOOT SURGERY     • GASTRIC BANDING      Revision, laparoscopic   • HYSTERECTOMY     • MOUTH SURGERY     • SALPINGO OOPHORECTOMY     • SHOULDER SURGERY     • SUBTALAR ARTHRODESIS Left 1/16/2019    Procedure: LEFT FOOT HARDWARE REMOVAL, FIFTH METATARSAL , OPEN REDUCTION INTERNAL FIXATION, CALCANEAL OSTEOTOMY;  Surgeon: Ignacio Lord DPM;  Location: [facility] OR;  Service: Podiatry   • SUBTALAR ARTHRODESIS Left 10/16/2019    Procedure: foot hardware removal, subtalar joint fusion  possible de/reattachment of achilles tendon        (c-arm);  Surgeon: Ignacio Lord DPM;  Location: [facility] OR;  Service: Podiatry   • TRANSESOPHAGEAL ECHOCARDIOGRAM (LAMONTE)      With color flow         Family History   Problem Relation Age of Onset   • Diabetes Other    • Heart disease Other    • Hypertension Other    • Heart disease Mother    • Stroke Mother    • Hypertension Mother    • Diabetes Sister    • Heart disease Sister    • Hypertension Sister    • Heart disease Sister    • Diabetes Sister    • Hypertension Sister    • Diabetes Sister    • Diabetes Sister    • Diabetes Sister    • Diabetes Sister          Social History     Socioeconomic History   • Marital status:      Spouse name: Not on file   • Number of children: Not on file   • Years of education: Not on file   • Highest education level: Not on file   Tobacco Use   • Smoking status: Never Smoker   • Smokeless tobacco: Never Used   Substance and Sexual Activity   • Alcohol use: No   • Drug use: No   • Sexual activity: Defer         Current Outpatient Medications   Medication Sig Dispense Refill   • ARIPiprazole (ABILIFY) 15  MG tablet Take 1 tablet by mouth Daily. 90 tablet 1   • aspirin 81 MG chewable tablet Chew 81 mg daily.     • atorvastatin (LIPITOR) 80 MG tablet Take 1 tablet by mouth Daily. (Patient taking differently: Take 40 mg by mouth Daily.) 90 tablet 1   • BD SHARPS CONTAINER HOME misc 1 each Take As Directed. 1 each 0   • Blood Glucose Monitoring Suppl (ONE TOUCH ULTRA MINI) w/Device kit USE AS DIRECTED TO CHECK BLOOD SUGAR 1 each 0   • Calcium Citrate-Vitamin D (CITRACAL/VITAMIN D) 250-200 MG-UNIT tablet Take 2 tablets by mouth 2 (two) times a day.     • clopidogrel (PLAVIX) 75 MG tablet TAKE 1 TABLET BY MOUTH DAILY. 90 tablet 1   • cyanocobalamin 1000 MCG/ML injection Inject 1 mL into the appropriate muscle as directed by prescriber Every 28 (Twenty-Eight) Days. 1 mL 2   • furosemide (LASIX) 40 MG tablet Take 1 tablet by mouth Daily. 90 tablet 1   • gabapentin (NEURONTIN) 400 MG capsule Take 1 capsule by mouth 3 (Three) Times a Day. 90 capsule 2   • GLUCAGON EMERGENCY 1 MG injection USE AS DIRECTED AS NEEDED 1 kit 0   • glucose blood test strip Use to check blood sugar 4 times daily. accucheck 125 each 12   • hydroCHLOROthiazide (HYDRODIURIL) 12.5 MG tablet Take 12.5 mg by mouth Daily.     • HYDROcodone-acetaminophen (Norco) 7.5-325 MG per tablet Take 1 tablet by mouth Every 6 (Six) Hours As Needed for Moderate Pain  or Severe Pain . 120 tablet 0   • Insulin Glargine (BASAGLAR KWIKPEN) 100 UNIT/ML injection pen Inject 100 Units under the skin into the appropriate area as directed Every Night. 6 pen 11   • Insulin Pen Needle (B-D ULTRAFINE III SHORT PEN) 31G X 8 MM misc USE TO INJECT 4 TIMES A  each 11   • LORazepam (ATIVAN) 0.5 MG tablet TAKE 1 TABLET BY MOUTH EVERY DAY AS NEEDED FOR ANXIETY 30 tablet 0   • meclizine (ANTIVERT) 25 MG tablet Take 1 tablet by mouth 3 (Three) Times a Day As Needed for dizziness. 90 tablet 6   • metoclopramide (REGLAN) 10 MG tablet Take 10 mg by mouth 4 (Four) Times a Day As Needed.    "  • metoprolol succinate XL (TOPROL-XL) 25 MG 24 hr tablet TAKE 1 TABLET BY MOUTH EVERY DAY 31 tablet 6   • mirtazapine (REMERON) 45 MG tablet TAKE 1 TABLET BY MOUTH EVERY NIGHT. 90 tablet 0   • NOVOLOG FLEXPEN 100 UNIT/ML solution pen-injector sc pen INJECT 60 UNITS BEFORE MEALS 3 TIMES A  mL 3   • ondansetron (ZOFRAN) 8 MG tablet Take 1 tablet by mouth Every 8 (Eight) Hours As Needed for Nausea or Vomiting. 30 tablet 0   • pantoprazole (PROTONIX) 20 MG EC tablet Take 1 tablet by mouth Daily. 90 tablet 3   • Syringe/Needle, Disp, (Luer Lock Safety Syringes) 25G X 5/8\" 3 ML misc 1 each Daily. 1 Q28 DAYS 1 each 2   • venlafaxine XR (EFFEXOR-XR) 150 MG 24 hr capsule Take 1 capsule by mouth 2 (Two) Times a Day. 90 capsule 1   • vitamin D (ERGOCALCIFEROL) 1.25 MG (73059 UT) capsule capsule TAKE ONE CAPSULE BY MOUTH EVERY SUNDAY. 12 capsule 2     No current facility-administered medications for this visit.      Review of Systems   Constitutional: Negative.    HENT: Negative.    Eyes: Negative.    Respiratory: Negative.    Cardiovascular: Negative.    Gastrointestinal: Negative.    Endocrine: Negative.    Genitourinary: Negative.    Musculoskeletal: Negative.         Left foot pain    Skin: Negative.    Allergic/Immunologic: Negative.    Neurological: Positive for numbness.   Hematological: Negative.    Psychiatric/Behavioral: Negative.          OBJECTIVE    Pulse 88   Ht 172.7 cm (68\")   Wt 119 kg (263 lb)   SpO2 98%   BMI 39.99 kg/m²         Physical Exam   Constitutional: she appears well-developed and well-nourished.   Psychiatric: she has a normal mood and affect. her   behavior is normal.      Lower Extremity Exam:  Neurovascular status intact  Cap Refill time brisk   Moderate edema to left ankle. Mild tenderness with palpation over medial gastrocnemius muscle belly  Left foot and ankle incisions healed.  No erythema.  No fluctuance.    Subtalar joint rectus, rigid.  Residual pes cavus with no tenderness " to palpation of plantar cuboid noted.  Moderate tenderness palpation of plantar medial calcaneal tubercle, medial plantar fascial band on left.    Procedures        ASSESSMENT AND PLAN    Ariana was seen today for follow-up, pain and edema.    Diagnoses and all orders for this visit:    Subtalar varus, acquired, left  -     XR Foot 3+ View Left  -     CT FOOT LEFT WITHOUT CONTRAST; Future    Nonunion of subtalar arthrodesis  -     CT FOOT LEFT WITHOUT CONTRAST; Future    Leg swelling  -     Duplex Venous Lower Extremity - Left CAR; Future        -Doing well postoperatively  -Radiographs ordered and reviewed.  Patient does have mild increase in lucency at arthrodesis site and loosening of 1 of her fixation screws.  -We will obtain CT scan to evaluate for healing within the hindfoot.  Consider possible hardware removal.  -Obtain venous duplex to rule out DVT  -Placed in the low tide Cam boot today for patient comfort.  -Recheck 2 weeks          This document has been electronically signed by Ignacio Lord DPM on June 1, 2020 16:13

## 2020-06-03 ENCOUNTER — APPOINTMENT (OUTPATIENT)
Dept: CARDIAC REHAB | Facility: HOSPITAL | Age: 60
End: 2020-06-03

## 2020-06-04 ENCOUNTER — HOSPITAL ENCOUNTER (OUTPATIENT)
Dept: CT IMAGING | Facility: HOSPITAL | Age: 60
End: 2020-06-04

## 2020-06-04 ENCOUNTER — INFUSION (OUTPATIENT)
Dept: ONCOLOGY | Facility: HOSPITAL | Age: 60
End: 2020-06-04

## 2020-06-04 VITALS
TEMPERATURE: 98 F | SYSTOLIC BLOOD PRESSURE: 170 MMHG | HEART RATE: 86 BPM | DIASTOLIC BLOOD PRESSURE: 84 MMHG | RESPIRATION RATE: 18 BRPM

## 2020-06-04 DIAGNOSIS — E61.1 IRON DEFICIENCY: Primary | ICD-10-CM

## 2020-06-04 DIAGNOSIS — T45.4X5A ADVERSE EFFECT OF IRON, INITIAL ENCOUNTER: ICD-10-CM

## 2020-06-04 PROCEDURE — 96374 THER/PROPH/DIAG INJ IV PUSH: CPT | Performed by: NURSE PRACTITIONER

## 2020-06-04 PROCEDURE — 25010000002 FERUMOXYTOL 510 MG/17ML SOLUTION 510 MG VIAL: Performed by: NURSE PRACTITIONER

## 2020-06-04 RX ORDER — SODIUM CHLORIDE 9 MG/ML
250 INJECTION, SOLUTION INTRAVENOUS ONCE
Status: CANCELLED | OUTPATIENT
Start: 2020-06-11

## 2020-06-04 RX ORDER — SODIUM CHLORIDE 9 MG/ML
250 INJECTION, SOLUTION INTRAVENOUS ONCE
Status: COMPLETED | OUTPATIENT
Start: 2020-06-04 | End: 2020-06-04

## 2020-06-04 RX ADMIN — FERUMOXYTOL 510 MG: 510 INJECTION INTRAVENOUS at 13:50

## 2020-06-04 RX ADMIN — SODIUM CHLORIDE 250 ML: 9 INJECTION, SOLUTION INTRAVENOUS at 13:50

## 2020-06-05 ENCOUNTER — HOSPITAL ENCOUNTER (OUTPATIENT)
Dept: CT IMAGING | Facility: HOSPITAL | Age: 60
Discharge: HOME | End: 2020-06-05
Admitting: PODIATRIST

## 2020-06-05 DIAGNOSIS — M21.172 SUBTALAR VARUS, ACQUIRED, LEFT: ICD-10-CM

## 2020-06-05 DIAGNOSIS — M96.0 NONUNION OF SUBTALAR ARTHRODESIS: ICD-10-CM

## 2020-06-05 PROCEDURE — 73700 CT LOWER EXTREMITY W/O DYE: CPT

## 2020-06-08 ENCOUNTER — OFFICE VISIT (OUTPATIENT)
Dept: PODIATRY | Facility: CLINIC | Age: 60
End: 2020-06-08

## 2020-06-08 VITALS — OXYGEN SATURATION: 98 % | HEIGHT: 68 IN | BODY MASS INDEX: 39.86 KG/M2 | HEART RATE: 73 BPM | WEIGHT: 263 LBS

## 2020-06-08 DIAGNOSIS — M96.0 NONUNION OF SUBTALAR ARTHRODESIS: Primary | ICD-10-CM

## 2020-06-08 DIAGNOSIS — M79.89 LEG SWELLING: ICD-10-CM

## 2020-06-08 DIAGNOSIS — M21.172 SUBTALAR VARUS, ACQUIRED, LEFT: ICD-10-CM

## 2020-06-08 PROCEDURE — 99213 OFFICE O/P EST LOW 20 MIN: CPT | Performed by: PODIATRIST

## 2020-06-08 NOTE — PROGRESS NOTES
Ariana Luis Martinez  1960  60 y.o. female   PCP: Kimberly Ferguson 05/26/2020  BS - 168 per patient     Patient presents today for a post op follow up on the left foot. Patient returns to clinic for CT results     06/08/2020            Chief Complaint   Patient presents with   • Left Foot - Follow-up       History of Present Illness    Ms Martinez is a 59 y/o diabetic female who presents for follow-up of left subtalar joint arthrodesis, hardware removal and gastrocnemius recession.  At last visit for increased pain and swelling.  Sent for CT and venous duplex.  Presents today to review these findings.  Has also return to cam boot which seems to help with her pain.  She does note continued progressive instability of her left ankle.    Past Medical History:   Diagnosis Date   • Angina, class IV (CMS/HCC)    • Anxiety    • Anxiety and depression    • Benign paroxysmal positional vertigo    • Bladder disorder     has bladder stimulator   • Carpal tunnel syndrome    • Chronic pain    • Coronary atherosclerosis     hx CABG 2005.  is followed by Dr Kwon   • Depression    • Diabetes mellitus (CMS/HCC)     Type 2, controlled   • Diabetic polyneuropathy (CMS/HCC)    • Elevated cholesterol    • Female stress incontinence    • Foot pain, left    • Full dentures    • Gastroparesis    • GERD (gastroesophageal reflux disease)    • Hyperlipidemia    • Hypertension    • Low back pain    • Malaise and fatigue    • Multiple joint pain    • Obesity     Refuses to be weighed   • Otalgia     Both   • Perforation of tympanic membrane     Left   • Postoperative wound infection    • Vitamin D deficiency    • Wears glasses     reading         Past Surgical History:   Procedure Laterality Date   • ABDOMINAL SURGERY     • ANGIOPLASTY      coronary   • BREAST BIOPSY Right    • CARDIAC CATHETERIZATION     • CARDIAC CATHETERIZATION N/A 6/20/2017    Procedure: Right Heart Cath;  Surgeon: Can Kwon MD PhD;  Location: [facility]  INVASIVE LOCATION;  Service:    • CARDIAC CATHETERIZATION N/A 2/18/2020    Procedure: Left Heart Cath;  Surgeon: Catalina Cooper MD;  Location: [facility] CATH INVASIVE LOCATION;  Service: Cardiology;  Laterality: N/A;   • CARPAL TUNNEL RELEASE     • CHOLECYSTECTOMY     • CORONARY ARTERY BYPASS GRAFT  2005   • ENDOSCOPY N/A 10/19/2018    Procedure: ESOPHAGOGASTRODUODENOSCOPY possible dilation;  Surgeon: Julián Maldonado MD;  Location: [facility] ENDOSCOPY;  Service: Gastroenterology   • ENDOSCOPY AND COLONOSCOPY     • FOOT SURGERY      Toes   • FOOT SURGERY     • GASTRIC BANDING      Revision, laparoscopic   • HYSTERECTOMY     • MOUTH SURGERY     • SALPINGO OOPHORECTOMY     • SHOULDER SURGERY     • SUBTALAR ARTHRODESIS Left 1/16/2019    Procedure: LEFT FOOT HARDWARE REMOVAL, FIFTH METATARSAL , OPEN REDUCTION INTERNAL FIXATION, CALCANEAL OSTEOTOMY;  Surgeon: Ignacio Lord DPM;  Location: [facility] OR;  Service: Podiatry   • SUBTALAR ARTHRODESIS Left 10/16/2019    Procedure: foot hardware removal, subtalar joint fusion  possible de/reattachment of achilles tendon        (c-arm);  Surgeon: Ignacio Lord DPM;  Location: [facility] OR;  Service: Podiatry   • TRANSESOPHAGEAL ECHOCARDIOGRAM (LAMONTE)      With color flow         Family History   Problem Relation Age of Onset   • Diabetes Other    • Heart disease Other    • Hypertension Other    • Heart disease Mother    • Stroke Mother    • Hypertension Mother    • Diabetes Sister    • Heart disease Sister    • Hypertension Sister    • Heart disease Sister    • Diabetes Sister    • Hypertension Sister    • Diabetes Sister    • Diabetes Sister    • Diabetes Sister    • Diabetes Sister          Social History     Socioeconomic History   • Marital status:      Spouse name: Not on file   • Number of children: Not on file   • Years of education: Not on file   • Highest education level: Not on file   Tobacco Use   • Smoking status: Never Smoker   • Smokeless tobacco: Never  Used   Substance and Sexual Activity   • Alcohol use: No   • Drug use: No   • Sexual activity: Defer         Current Outpatient Medications   Medication Sig Dispense Refill   • ARIPiprazole (ABILIFY) 15 MG tablet Take 1 tablet by mouth Daily. 90 tablet 1   • aspirin 81 MG chewable tablet Chew 81 mg daily.     • atorvastatin (LIPITOR) 80 MG tablet Take 1 tablet by mouth Daily. (Patient taking differently: Take 40 mg by mouth Daily.) 90 tablet 1   • BD SHARPS CONTAINER HOME misc 1 each Take As Directed. 1 each 0   • Blood Glucose Monitoring Suppl (ONE TOUCH ULTRA MINI) w/Device kit USE AS DIRECTED TO CHECK BLOOD SUGAR 1 each 0   • Calcium Citrate-Vitamin D (CITRACAL/VITAMIN D) 250-200 MG-UNIT tablet Take 2 tablets by mouth 2 (two) times a day.     • clopidogrel (PLAVIX) 75 MG tablet TAKE 1 TABLET BY MOUTH DAILY. 90 tablet 1   • cyanocobalamin 1000 MCG/ML injection Inject 1 mL into the appropriate muscle as directed by prescriber Every 28 (Twenty-Eight) Days. 1 mL 2   • furosemide (LASIX) 40 MG tablet Take 1 tablet by mouth Daily. 90 tablet 1   • gabapentin (NEURONTIN) 400 MG capsule Take 1 capsule by mouth 3 (Three) Times a Day. 90 capsule 2   • GLUCAGON EMERGENCY 1 MG injection USE AS DIRECTED AS NEEDED 1 kit 0   • glucose blood test strip Use to check blood sugar 4 times daily. accucheck 125 each 12   • hydroCHLOROthiazide (HYDRODIURIL) 12.5 MG tablet Take 12.5 mg by mouth Daily.     • HYDROcodone-acetaminophen (Norco) 7.5-325 MG per tablet Take 1 tablet by mouth Every 6 (Six) Hours As Needed for Moderate Pain  or Severe Pain . 120 tablet 0   • Insulin Pen Needle (B-D ULTRAFINE III SHORT PEN) 31G X 8 MM misc USE TO INJECT 4 TIMES A  each 11   • LORazepam (ATIVAN) 0.5 MG tablet TAKE 1 TABLET BY MOUTH EVERY DAY AS NEEDED FOR ANXIETY 30 tablet 0   • meclizine (ANTIVERT) 25 MG tablet Take 1 tablet by mouth 3 (Three) Times a Day As Needed for dizziness. 90 tablet 6   • metoclopramide (REGLAN) 10 MG tablet Take 10  "mg by mouth 4 (Four) Times a Day As Needed.     • metoprolol succinate XL (TOPROL-XL) 25 MG 24 hr tablet TAKE 1 TABLET BY MOUTH EVERY DAY 31 tablet 6   • mirtazapine (REMERON) 45 MG tablet TAKE 1 TABLET BY MOUTH EVERY NIGHT. 90 tablet 0   • NOVOLOG FLEXPEN 100 UNIT/ML solution pen-injector sc pen INJECT 60 UNITS BEFORE MEALS 3 TIMES A  mL 3   • ondansetron (ZOFRAN) 8 MG tablet Take 1 tablet by mouth Every 8 (Eight) Hours As Needed for Nausea or Vomiting. 30 tablet 0   • pantoprazole (PROTONIX) 20 MG EC tablet Take 1 tablet by mouth Daily. 90 tablet 3   • Syringe/Needle, Disp, (Luer Lock Safety Syringes) 25G X 5/8\" 3 ML misc 1 each Daily. 1 Q28 DAYS 1 each 2   • venlafaxine XR (EFFEXOR-XR) 150 MG 24 hr capsule Take 1 capsule by mouth 2 (Two) Times a Day. 90 capsule 1   • vitamin D (ERGOCALCIFEROL) 1.25 MG (54379 UT) capsule capsule TAKE ONE CAPSULE BY MOUTH EVERY SUNDAY. 12 capsule 2   • Insulin Glargine (BASAGLAR KWIKPEN) 100 UNIT/ML injection pen Inject 100 units under skin in the the appropriate area as directed every night.  10 pen 2   • sodium-potassium-magnesium sulfates (Suprep Bowel Prep Kit) 17.5-3.13-1.6 GM/177ML solution oral solution Take 1 bottle by mouth Every 12 (Twelve) Hours. 2 bottle 0     No current facility-administered medications for this visit.      Review of Systems   Constitutional: Negative.    HENT: Negative.    Eyes: Negative.    Respiratory: Negative.    Cardiovascular: Negative.    Gastrointestinal: Negative.    Endocrine: Negative.    Genitourinary: Negative.    Musculoskeletal: Negative.         Left foot pain    Skin: Negative.    Allergic/Immunologic: Negative.    Neurological: Positive for numbness.   Hematological: Negative.    Psychiatric/Behavioral: Negative.          OBJECTIVE    Pulse 73   Ht 172.7 cm (68\")   Wt 119 kg (263 lb)   SpO2 98%   BMI 39.99 kg/m²       Physical Exam   Constitutional: she appears well-developed and well-nourished.   HEENT: Normocephalic. " Atraumatic.  CV: No CP. RRR  Resp: Non-labored respirations.  Psychiatric: she has a normal mood and affect. her behavior is normal.           Lower Extremity Exam:  Neurovascular status intact  Cap Refill time brisk   Moderate edema to left ankle. Mild tenderness with palpation over medial gastrocnemius muscle belly  Left foot and ankle incisions healed.  No erythema.  No fluctuance.    Subtalar joint in mild residual varus, rigid.  Residual pes cavus with no tenderness to palpation of plantar cuboid noted.  Moderate tenderness palpation of plantar medial calcaneal tubercle        CT left foot.     CLINICAL INDICATION: Prior fracture, arthrodesis. Evaluate for  nonunion.        COMPARISON: Left foot 2020.        TECHNIQUE: Noncontrast study. Multiplanar noncontrast images.  Helical scanning with axial and coronal reformations. Soft  tissue, lung, and bone windows reviewed.     This exam was performed according to our departmental  dose-optimization program, which includes automated exposure  control, adjustment of the mA and/or kV according to patient size  and/or use of iterative reconstruction technique.      CT FINDINGS: Talocalcaneal surgical arthrodesis stabilized by  two large caliber surgical screws.     Significant radiolucency surrounding these screws indicating  loosening.     Widened somewhat irregular articulation between the talus and the  calcaneus. This suggests nonunion, pseudoarthrosis.     IMPRESSION:  As above.     Electronically signed by:  Naveed Taylor MD  2020 4:11 PM CDT  Workstation: [id number]      Procedures Patient Information     Patient Name  Ariana Martinez MRN  [id number] Sex  Female  (Age)  1960 (60 y.o.)   Clinical Indication     Left leg swelling, h/o foot surgery, immobilization   Dx: Leg swelling [M79.89 (ICD-10-CM)]      Interpretation Summary     · Appears negative for DVT of the left lower extremity  · Appears negative for reflux of the left lower  extremity  · Prominent lymph node LT GROIN 3.68 cm             ASSESSMENT AND PLAN    Ariana was seen today for follow-up.    Diagnoses and all orders for this visit:    Nonunion of subtalar arthrodesis    Subtalar varus, acquired, left    Leg swelling        -Reviewed CT, venous duplex with patient.  No acute DVT identified.  -Patient's recent stent increase and discomfort likely secondary to nonunion of subtalar joint along with interval hardware loosening.  -Advised patient to continue ambulation and Cam boot for now.  Will attempt to obtain bone stimulator for subtalar joint nonunion.  Discussed patient will likely require further surgery whether this is revisional arthrodesis or simple hardware removal.  -Recheck 4 weeks          This document has been electronically signed by Ignacio Lord DPM on June 9, 2020 19:55

## 2020-06-09 ENCOUNTER — OFFICE VISIT (OUTPATIENT)
Dept: GASTROENTEROLOGY | Facility: CLINIC | Age: 60
End: 2020-06-09

## 2020-06-09 VITALS
OXYGEN SATURATION: 97 % | DIASTOLIC BLOOD PRESSURE: 80 MMHG | HEIGHT: 68 IN | WEIGHT: 255.4 LBS | SYSTOLIC BLOOD PRESSURE: 140 MMHG | BODY MASS INDEX: 38.71 KG/M2 | HEART RATE: 77 BPM

## 2020-06-09 DIAGNOSIS — R53.83 MALAISE AND FATIGUE: ICD-10-CM

## 2020-06-09 DIAGNOSIS — R53.81 MALAISE AND FATIGUE: ICD-10-CM

## 2020-06-09 DIAGNOSIS — D50.0 IRON DEFICIENCY ANEMIA DUE TO CHRONIC BLOOD LOSS: Primary | ICD-10-CM

## 2020-06-09 PROCEDURE — 99214 OFFICE O/P EST MOD 30 MIN: CPT | Performed by: NURSE PRACTITIONER

## 2020-06-09 RX ORDER — DEXTROSE AND SODIUM CHLORIDE 5; .45 G/100ML; G/100ML
30 INJECTION, SOLUTION INTRAVENOUS CONTINUOUS PRN
Status: CANCELLED | OUTPATIENT
Start: 2020-06-24

## 2020-06-09 RX ORDER — SODIUM, POTASSIUM,MAG SULFATES 17.5-3.13G
1 SOLUTION, RECONSTITUTED, ORAL ORAL EVERY 12 HOURS
Qty: 2 BOTTLE | Refills: 0 | Status: SHIPPED | OUTPATIENT
Start: 2020-06-09 | End: 2020-06-24 | Stop reason: HOSPADM

## 2020-06-09 RX ORDER — INSULIN GLARGINE 100 [IU]/ML
INJECTION, SOLUTION SUBCUTANEOUS
Qty: 10 PEN | Refills: 2 | Status: SHIPPED | OUTPATIENT
Start: 2020-06-09 | End: 2021-03-09

## 2020-06-09 NOTE — PROGRESS NOTES
Chief Complaint   Patient presents with   • Anemia       Subjective    Ariana Martinez is a 60 y.o. female. she is here today for follow-up.    History of Present Illness  60-year-old female presents to discuss iron deficiency anemia.  She is followed by hematology and has received iron infusions CBC noted stable hemoglobin however iron deficiency was noted with iron saturation of only 11% ferritin of 46.  Patient denies any abdominal pain she does have gastroparesis and has issues with that sometimes and takes Reglan on an as-needed basis.  She denies any melena or hematochezia.  Reports she has felt more tired recently she was last seen in this office in 2018 underwent EGD and colonoscopy at that time which showed no acute abnormality.  Plan; schedule patient for EGD and colonoscopy due to iron deficiency.  Follow-up after test return office sooner if needed.     The following portions of the patient's history were reviewed and updated as appropriate:   Past Medical History:   Diagnosis Date   • Angina, class IV (CMS/HCC)    • Anxiety    • Anxiety and depression    • Benign paroxysmal positional vertigo    • Bladder disorder     has bladder stimulator   • Carpal tunnel syndrome    • Chronic pain    • Coronary atherosclerosis     hx CABG 2005.  is followed by Dr Kwon   • Depression    • Diabetes mellitus (CMS/HCC)     Type 2, controlled   • Diabetic polyneuropathy (CMS/HCC)    • Elevated cholesterol    • Female stress incontinence    • Foot pain, left    • Full dentures    • Gastroparesis    • GERD (gastroesophageal reflux disease)    • Hyperlipidemia    • Hypertension    • Low back pain    • Malaise and fatigue    • Multiple joint pain    • Obesity     Refuses to be weighed   • Otalgia     Both   • Perforation of tympanic membrane     Left   • Postoperative wound infection    • Vitamin D deficiency    • Wears glasses     reading     Past Surgical History:   Procedure Laterality Date   • ABDOMINAL SURGERY      • ANGIOPLASTY      coronary   • BREAST BIOPSY Right    • CARDIAC CATHETERIZATION     • CARDIAC CATHETERIZATION N/A 2017    Procedure: Right Heart Cath;  Surgeon: Can Kwon MD PhD;  Location: [facility] CATH INVASIVE LOCATION;  Service:    • CARDIAC CATHETERIZATION N/A 2020    Procedure: Left Heart Cath;  Surgeon: Catalina Cooper MD;  Location: [facility] CATH INVASIVE LOCATION;  Service: Cardiology;  Laterality: N/A;   • CARPAL TUNNEL RELEASE     • CHOLECYSTECTOMY     • CORONARY ARTERY BYPASS GRAFT     • ENDOSCOPY N/A 10/19/2018    Procedure: ESOPHAGOGASTRODUODENOSCOPY possible dilation;  Surgeon: Julián Maldonado MD;  Location: [facility] ENDOSCOPY;  Service: Gastroenterology   • ENDOSCOPY AND COLONOSCOPY     • FOOT SURGERY      Toes   • FOOT SURGERY     • GASTRIC BANDING      Revision, laparoscopic   • HYSTERECTOMY     • MOUTH SURGERY     • SALPINGO OOPHORECTOMY     • SHOULDER SURGERY     • SUBTALAR ARTHRODESIS Left 2019    Procedure: LEFT FOOT HARDWARE REMOVAL, FIFTH METATARSAL , OPEN REDUCTION INTERNAL FIXATION, CALCANEAL OSTEOTOMY;  Surgeon: Ignacio Lord DPM;  Location: [facility] OR;  Service: Podiatry   • SUBTALAR ARTHRODESIS Left 10/16/2019    Procedure: foot hardware removal, subtalar joint fusion  possible de/reattachment of achilles tendon        (c-arm);  Surgeon: Ignacio Lord DPM;  Location: [facility] OR;  Service: Podiatry   • TRANSESOPHAGEAL ECHOCARDIOGRAM (LAMONTE)      With color flow     Family History   Problem Relation Age of Onset   • Diabetes Other    • Heart disease Other    • Hypertension Other    • Heart disease Mother    • Stroke Mother    • Hypertension Mother    • Diabetes Sister    • Heart disease Sister    • Hypertension Sister    • Heart disease Sister    • Diabetes Sister    • Hypertension Sister    • Diabetes Sister    • Diabetes Sister    • Diabetes Sister    • Diabetes Sister      OB History        0    Para   0    Term   0       0    AB    0    Living   0       SAB   0    TAB   0    Ectopic   0    Molar        Multiple   0    Live Births                  Prior to Admission medications    Medication Sig Start Date End Date Taking? Authorizing Provider   ARIPiprazole (ABILIFY) 15 MG tablet Take 1 tablet by mouth Daily. 3/14/19  Yes Francisca Fong MD   aspirin 81 MG chewable tablet Chew 81 mg daily.   Yes ProviderEsther MD   atorvastatin (LIPITOR) 80 MG tablet Take 1 tablet by mouth Daily.  Patient taking differently: Take 40 mg by mouth Daily. 12/26/19  Yes Kimberly Ferguson APRN   BD SHARPS CONTAINER HOME misc 1 each Take As Directed. 9/7/18  Yes Francisca Fong MD   Blood Glucose Monitoring Suppl (ONE TOUCH ULTRA MINI) w/Device kit USE AS DIRECTED TO CHECK BLOOD SUGAR 7/11/18  Yes Francisca Fong MD   Calcium Citrate-Vitamin D (CITRACAL/VITAMIN D) 250-200 MG-UNIT tablet Take 2 tablets by mouth 2 (two) times a day.   Yes ProviderEsther MD   clopidogrel (PLAVIX) 75 MG tablet TAKE 1 TABLET BY MOUTH DAILY. 2/3/20  Yes Kimberly Ferguson APRN   cyanocobalamin 1000 MCG/ML injection Inject 1 mL into the appropriate muscle as directed by prescriber Every 28 (Twenty-Eight) Days. 4/16/20  Yes Kimberly Ferguson APRN   furosemide (LASIX) 40 MG tablet Take 1 tablet by mouth Daily. 4/20/20  Yes Kimberly Ferguson APRN   gabapentin (NEURONTIN) 400 MG capsule Take 1 capsule by mouth 3 (Three) Times a Day. 5/26/20  Yes Kimberly Ferguson APRN   GLUCAGON EMERGENCY 1 MG injection USE AS DIRECTED AS NEEDED 7/28/17  Yes Elvis Gamboa APRN   glucose blood test strip Use to check blood sugar 4 times daily. accucheck 1/30/20  Yes Elvis Gamboa APRN   hydroCHLOROthiazide (HYDRODIURIL) 12.5 MG tablet Take 12.5 mg by mouth Daily.   Yes ProviderEsther MD   HYDROcodone-acetaminophen (Norco) 7.5-325 MG per tablet Take 1 tablet by mouth Every 6 (Six) Hours As Needed for Moderate Pain  or Severe Pain . 5/26/20  Yes  "Kimberly Ferguson APRN   Insulin Glargine (BASAGLAR KWIKPEN) 100 UNIT/ML injection pen Inject 100 units under skin in the the appropriate area as directed every night.  6/9/20  Yes Elvis Gamboa APRN   Insulin Pen Needle (B-D ULTRAFINE III SHORT PEN) 31G X 8 MM misc USE TO INJECT 4 TIMES A DAY 7/17/19  Yes Elvis Gamboa APRN   LORazepam (ATIVAN) 0.5 MG tablet TAKE 1 TABLET BY MOUTH EVERY DAY AS NEEDED FOR ANXIETY 5/21/20  Yes Kimberly Ferguson APRN   meclizine (ANTIVERT) 25 MG tablet Take 1 tablet by mouth 3 (Three) Times a Day As Needed for dizziness. 12/14/17  Yes Evon Hernandez APRN   metoclopramide (REGLAN) 10 MG tablet Take 10 mg by mouth 4 (Four) Times a Day As Needed.   Yes Provider, MD Esther   metoprolol succinate XL (TOPROL-XL) 25 MG 24 hr tablet TAKE 1 TABLET BY MOUTH EVERY DAY 6/1/20  Yes Bill Gibson MD   mirtazapine (REMERON) 45 MG tablet TAKE 1 TABLET BY MOUTH EVERY NIGHT. 7/17/19  Yes Francisca Fong MD   NOVOLOG FLEXPEN 100 UNIT/ML solution pen-injector sc pen INJECT 60 UNITS BEFORE MEALS 3 TIMES A DAY 4/6/20  Yes Baldemar Melendez MD   ondansetron (ZOFRAN) 8 MG tablet Take 1 tablet by mouth Every 8 (Eight) Hours As Needed for Nausea or Vomiting. 4/2/20  Yes Kimberly Ferguson APRN   pantoprazole (PROTONIX) 20 MG EC tablet Take 1 tablet by mouth Daily. 3/27/20  Yes Kimberly Ferguson APRN   Syringe/Needle, Disp, (Luer Lock Safety Syringes) 25G X 5/8\" 3 ML misc 1 each Daily. 1 Q28 DAYS 4/16/20  Yes Kimberly Ferguson APRN   venlafaxine XR (EFFEXOR-XR) 150 MG 24 hr capsule Take 1 capsule by mouth 2 (Two) Times a Day. 5/19/20  Yes Kimberly Ferguson APRN   vitamin D (ERGOCALCIFEROL) 1.25 MG (04135 UT) capsule capsule TAKE ONE CAPSULE BY MOUTH EVERY SUNDAY. 12/26/19  Yes Ethel Landaverde PA-C   Insulin Glargine (BASAGLAR KWIKPEN) 100 UNIT/ML injection pen Inject 100 Units under the skin into the appropriate area as directed Every Night. " "12/11/19 6/9/20  Elvis Gamboa APRN     Allergies   Allergen Reactions   • Seroquel [Quetiapine] Anaphylaxis   • Avandia [Rosiglitazone] Swelling   • Morphine And Related Hallucinations   • Oxycodone Hallucinations     Social History     Socioeconomic History   • Marital status:      Spouse name: Not on file   • Number of children: Not on file   • Years of education: Not on file   • Highest education level: Not on file   Tobacco Use   • Smoking status: Never Smoker   • Smokeless tobacco: Never Used   Substance and Sexual Activity   • Alcohol use: No   • Drug use: No   • Sexual activity: Defer       Review of Systems  Review of Systems   Constitutional: Positive for fatigue. Negative for activity change, appetite change, chills, diaphoresis, fever and unexpected weight change.   HENT: Negative for sore throat and trouble swallowing.    Respiratory: Negative for shortness of breath.    Gastrointestinal: Positive for abdominal pain. Negative for abdominal distention, anal bleeding, blood in stool, constipation, diarrhea, nausea, rectal pain and vomiting.   Musculoskeletal: Negative for arthralgias.   Skin: Negative for pallor.   Neurological: Negative for light-headedness.        /80   Pulse 77   Ht 172.7 cm (68\")   Wt 116 kg (255 lb 6.4 oz)   SpO2 97%   BMI 38.83 kg/m²     Objective    Physical Exam   Constitutional: She is oriented to person, place, and time. She appears well-developed and well-nourished. She is cooperative. No distress.   HENT:   Head: Normocephalic and atraumatic.   Neck: Normal range of motion. Neck supple. No thyromegaly present.   Cardiovascular: Normal rate, regular rhythm and normal heart sounds.   Pulmonary/Chest: Effort normal and breath sounds normal. She has no wheezes. She has no rhonchi. She has no rales.   Abdominal: Soft. Normal appearance and bowel sounds are normal. She exhibits no distension. There is no hepatosplenomegaly. There is tenderness in the " epigastric area. There is no rigidity and no guarding. No hernia.   Lymphadenopathy:     She has no cervical adenopathy.   Neurological: She is alert and oriented to person, place, and time.   Skin: Skin is warm, dry and intact. No rash noted. No pallor.   Psychiatric: She has a normal mood and affect. Her speech is normal.     Hospital Outpatient Visit on 06/03/2020   Component Date Value Ref Range Status   • Left Common Femoral Augment 06/03/2020 Y   In process   • Left Common Femoral Compress 06/03/2020 C   In process   • Left Saphenofemoral Junction Augme* 06/03/2020 Y   In process   • Left Saphenofemoral Junction Compr* 06/03/2020 C   In process   • Left Profunda Femoral Augment 06/03/2020 Y   In process   • Left Proximal Femoral Augment 06/03/2020 Y   In process   • Left Proximal Femoral Compress 06/03/2020 C   In process   • Left Mid Femoral Augment 06/03/2020 Y   In process   • Left Mid Femoral Competent 06/03/2020 Y   In process   • Left Mid Femoral Compress 06/03/2020 C   In process   • Left Distal Femoral Augment 06/03/2020 Y   In process   • Left Popliteal Augment 06/03/2020 Y   In process   • Left Posterior Tibial Augment 06/03/2020 Y   In process   • Left Peroneal Augment 06/03/2020 Y   In process   • Left Greater Saph AK Augment 06/03/2020 Y   In process   • Left Greater Saph AK Competent 06/03/2020 Y   In process   • Left Greater Saph AK Compress 06/03/2020 C   In process   • Left Greater Saph BK Augment 06/03/2020 Y   In process   • Left Greater Saph BK Competent 06/03/2020 Y   In process   • Left Greater Saph BK Compress 06/03/2020 C   In process   • Left Distal Femoral Compress 06/03/2020 C   In process     Assessment/Plan      1. Iron deficiency anemia due to chronic blood loss    2. Malaise and fatigue    .       Orders placed during this encounter include:  Orders Placed This Encounter   Procedures   • Follow Anesthesia Guidelines / Standing Orders     Standing Status:   Future   • Obtain  Informed Consent     Standing Status:   Future     Order Specific Question:   Informed Consent Given For     Answer:   ESOPHAGOGASTRODUODENOSCOPY and Colonoscopy       ESOPHAGOGASTRODUODENOSCOPY WED appt please (N/A), COLONOSCOPY (N/A)    Review and/or summary of lab tests, radiology, procedures, medications. Review and summary of old records and obtaining of history. The risks and benefits of my recommendations, as well as other treatment options were discussed with the patient today. Questions were answered.    New Medications Ordered This Visit   Medications   • sodium-potassium-magnesium sulfates (Suprep Bowel Prep Kit) 17.5-3.13-1.6 GM/177ML solution oral solution     Sig: Take 1 bottle by mouth Every 12 (Twelve) Hours.     Dispense:  2 bottle     Refill:  0       Follow-up: Return in about 4 weeks (around 7/7/2020).          This document has been electronically signed by BEBE Leach on June 9, 2020 15:26             Results for orders placed or performed during the hospital encounter of 06/03/20   Duplex Venous Lower Extremity - Left CAR   Result Value Ref Range    Left Common Femoral Augment Y     Left Common Femoral Compress C     Left Saphenofemoral Junction Augment Y     Left Saphenofemoral Junction Compress C     Left Profunda Femoral Augment Y     Left Proximal Femoral Augment Y     Left Proximal Femoral Compress C     Left Mid Femoral Augment Y     Left Mid Femoral Competent Y     Left Mid Femoral Compress C     Left Distal Femoral Augment Y     Left Popliteal Augment Y     Left Posterior Tibial Augment Y     Left Peroneal Augment Y     Left Greater Saph AK Augment Y     Left Greater Saph AK Competent Y     Left Greater Saph AK Compress C     Left Greater Saph BK Augment Y     Left Greater Saph BK Competent Y     Left Greater Saph BK Compress C     Left Distal Femoral Compress C    Results for orders placed or performed in visit on 05/22/20   CBC Auto Differential   Result Value Ref Range    WBC  10.32 3.40 - 10.80 10*3/mm3    RBC 4.51 3.77 - 5.28 10*6/mm3    Hemoglobin 12.9 12.0 - 15.9 g/dL    Hematocrit 38.7 34.0 - 46.6 %    MCV 85.8 79.0 - 97.0 fL    MCH 28.6 26.6 - 33.0 pg    MCHC 33.3 31.5 - 35.7 g/dL    RDW 13.3 12.3 - 15.4 %    RDW-SD 41.4 37.0 - 54.0 fl    MPV 9.5 6.0 - 12.0 fL    Platelets 364 140 - 450 10*3/mm3    Neutrophil % 67.4 42.7 - 76.0 %    Lymphocyte % 22.3 19.6 - 45.3 %    Monocyte % 6.1 5.0 - 12.0 %    Eosinophil % 3.2 0.3 - 6.2 %    Basophil % 0.6 0.0 - 1.5 %    Immature Grans % 0.4 0.0 - 0.5 %    Neutrophils, Absolute 6.96 1.70 - 7.00 10*3/mm3    Lymphocytes, Absolute 2.30 0.70 - 3.10 10*3/mm3    Monocytes, Absolute 0.63 0.10 - 0.90 10*3/mm3    Eosinophils, Absolute 0.33 0.00 - 0.40 10*3/mm3    Basophils, Absolute 0.06 0.00 - 0.20 10*3/mm3    Immature Grans, Absolute 0.04 0.00 - 0.05 10*3/mm3    nRBC 0.0 0.0 - 0.2 /100 WBC   Iron and TIBC   Result Value Ref Range    Iron 56 37 - 145 mcg/dL    Iron Saturation 16 (L) 20 - 50 %    Transferrin 228 200 - 360 mg/dL    TIBC 340 298 - 536 mcg/dL   Folate   Result Value Ref Range    Folate 11.90 4.78 - 24.20 ng/mL   Ferritin   Result Value Ref Range    Ferritin 110.60 13.00 - 150.00 ng/mL   Vitamin B12   Result Value Ref Range    Vitamin B-12 526 211 - 946 pg/mL   Results for orders placed or performed in visit on 03/03/20   ToxASSURE Select 13 Discrete -   Result Value Ref Range    Report Summary FINAL     Ethanol Screen Urine (Ref) +POSITIVE+     Ethanol, Urine >0.400 g/dL    Amphetamine, Urine Qual Negative     Methamphetamine, Urine Not Detected ng/mg creat    Amphetamine, Urine Not Detected ng/mg creat    MDMA URINE Not Detected ng/mg creat    MDA Not Detected ng/mg creat    Benzodiazepine Screen, Urine +POSITIVE+     DIAZEPAM URINE QUANT (REF) Not Detected ng/mg creat    Desmethyldiazepam Not Detected ng/mg creat    Oxazepam, urine Not Detected ng/mg creat    Temazepam, urine Not Detected ng/mg creat    ALPRAZOLAM Not Detected ng/mg creat     ALPHA-HYDROXYALPRAZOLAM UR, QT (REF) Not Detected ng/mg creat    DESALKLFLURAZEPAM UR QUANT (REF) Not Detected ng/mg creat    LORAZEPAM URINE QUANT (REF) 167 ng/mg creat    Alpha-hydroxytriazolam, Urine Not Detected ng/mg creat    Clonazepam Urine Not Detected ng/mg creat    7-Aminoclonazepam Urine Not Detected ng/mg creat    MIDAZOLAM UR, QUANT (REF) Not Detected ng/mg creat    Alpha-hydroxymidazolam, Urine Not Detected ng/mg creat    Flunitrazepam Not Detected ng/mg creat    DESMETHYLFLUNITRAZEPAM Not Detected ng/mg creat    Cocaine & Metabolite Negative     Cocaine Screen, Urine Not Detected ng/mg creat    Benzoylecgonine, Urine Not Detected ng/mg creat    Cocaethylene Ur Not Detected ng/mg creat    THC, Urine Negative     Carboxy THC, Urine Not Detected ng/mg creat    6-ACETYLMORPHINE Negative     6-acetylmorphine Not Detected ng/mg creat    Opiate Class +POSITIVE+     Codeine, Urine Not Detected ng/mg creat    Morphine, Urine Not Detected ng/mg creat    normorphine Not Detected ng/mg creat    Norcodeine Ur Not Detected ng/mg creat    Hydrocodone UR 1078 ng/mg creat    Hydromorphone Urine 168 ng/mg creat    Dihydrocodeine, Urine 111 ng/mg creat    Opiates, Norhydrocodone, Urine 552 ng/mg creat    Oxycodone Class Ur Negative     Oxycodone, Confirmation, Urine Not Detected ng/mg creat    Oxymorphone UR Not Detected ng/mg creat    Opiates, Noroxycodone, Urine Not Detected ng/mg creat    Noroxymorphone Not Detected ng/mg creat    Methadone Negative     Methadone, Quantitative Not Detected ng/mg creat    EDDP Not Detected ng/mg creat    Fentanyl and Analogues, Ur Negative     Fentanyl, Urine Not Detected ng/mg creat    Norfentanyl Urine Not Detected ng/mg creat    Sufentanil, Ur Not Detected ng/mg creat    Alfentanil, Ur Not Detected ng/mg creat    BUPRENORPHINE Negative     Buprenorphine, Urine Not Detected ng/mg creat    Norbuprenorphine Not Detected ng/mg creat    Tapentadol Negative     Tapentadol Ur Not  Detected ng/mg creat    Opoidss other, UR Negative     Tramadol, Urine Not Detected ng/mg creat    O-desmethyltramadol, Ur Not Detected ng/mg creat    N-Desmethyltramadol, U Not Detected ng/mg creat    BARBITURATES, URINE Negative     Amobarbital, Urine Not Detected     Barbital Not Detected     Butabarbital, Ur Not Detected     Butalbital Screen, Urine Not Detected     Mephobarbital Urine Not Detected     Pentobarbital UR Not Detected     Phenobarbital Not Detected     Secobarbital, urine Not Detected     Thiopental, Ur Not Detected     Creatinine, Urine 130 mg/dL    Level of Detection: Comment      *Note: Due to a large number of results and/or encounters for the requested time period, some results have not been displayed. A complete set of results can be found in Results Review.

## 2020-06-09 NOTE — PATIENT INSTRUCTIONS
"BMI for Adults    Body mass index (BMI) is a number that is calculated from a person's weight and height. BMI may help to estimate how much of a person's weight is composed of fat. BMI can help identify those who may be at higher risk for certain medical problems.  How is BMI used with adults?  BMI is used as a screening tool to identify possible weight problems. It is used to check whether a person is obese, overweight, healthy weight, or underweight.  How is BMI calculated?  BMI measures your weight and compares it to your height. This can be done either in English ([place] or metric measurements. Note that charts are available to help you find your BMI quickly and easily without having to do these calculations yourself.  To calculate your BMI in English ([place] measurements, your health care provider will:  1. Measure your weight in pounds (lb).  2. Multiply the number of pounds by 703.  ? For example, for a person who weighs 180 lb, multiply that number by 703, which equals 126,540.  3. Measure your height in inches (in). Then multiply that number by itself to get a measurement called \"inches squared.\"  ? For example, for a person who is 70 in tall, the \"inches squared\" measurement is 70 in x 70 in, which equals 4900 inches squared.  4. Divide the total from Step 2 (number of lb x 703) by the total from Step 3 (inches squared): 126,540 ÷ 4900 = 25.8. This is your BMI.  To calculate your BMI in metric measurements, your health care provider will:  1. Measure your weight in kilograms (kg).  2. Measure your height in meters (m). Then multiply that number by itself to get a measurement called \"meters squared.\"  ? For example, for a person who is 1.75 m tall, the \"meters squared\" measurement is 1.75 m x 1.75 m, which is equal to 3.1 meters squared.  3. Divide the number of kilograms (your weight) by the meters squared number. In this example: 70 ÷ 3.1 = 22.6. This is your BMI.  How is BMI interpreted?  To interpret your " results, your health care provider will use BMI charts to identify whether you are underweight, normal weight, overweight, or obese. The following guidelines will be used:  · Underweight: BMI less than 18.5.  · Normal weight: BMI between 18.5 and 24.9.  · Overweight: BMI between 25 and 29.9.  · Obese: BMI of 30 and above.  Please note:  · Weight includes both fat and muscle, so someone with a muscular build, such as an athlete, may have a BMI that is higher than 24.9. In cases like these, BMI is not an accurate measure of body fat.  · To determine if excess body fat is the cause of a BMI of 25 or higher, further assessments may need to be done by a health care provider.  · BMI is usually interpreted in the same way for men and women.  Why is BMI a useful tool?  BMI is useful in two ways:  · Identifying a weight problem that may be related to a medical condition, or that may increase the risk for medical problems.  · Promoting lifestyle and diet changes in order to reach a healthy weight.  Summary  · Body mass index (BMI) is a number that is calculated from a person's weight and height.  · BMI may help to estimate how much of a person's weight is composed of fat. BMI can help identify those who may be at higher risk for certain medical problems.  · BMI can be measured using English measurements or metric measurements.  · To interpret your results, your health care provider will use BMI charts to identify whether you are underweight, normal weight, overweight, or obese.  This information is not intended to replace advice given to you by your health care provider. Make sure you discuss any questions you have with your health care provider.  Document Released: 08/29/2005 Document Revised: 11/30/2018 Document Reviewed: 10/31/2018  [employer] Patient Education © 2020 [employer]

## 2020-06-10 ENCOUNTER — LAB (OUTPATIENT)
Dept: LAB | Facility: HOSPITAL | Age: 60
End: 2020-06-10

## 2020-06-10 ENCOUNTER — TELEPHONE (OUTPATIENT)
Dept: PODIATRY | Facility: CLINIC | Age: 60
End: 2020-06-10

## 2020-06-10 LAB
25(OH)D3 SERPL-MCNC: 30.1 NG/ML (ref 30–100)
ALBUMIN SERPL-MCNC: 4.1 G/DL (ref 3.5–5.2)
ALBUMIN UR-MCNC: 3.8 MG/DL
ALBUMIN/GLOB SERPL: 1.5 G/DL
ALP SERPL-CCNC: 144 U/L (ref 39–117)
ALT SERPL W P-5'-P-CCNC: 15 U/L (ref 1–33)
ANION GAP SERPL CALCULATED.3IONS-SCNC: 15.3 MMOL/L (ref 5–15)
AST SERPL-CCNC: 14 U/L (ref 1–32)
BASOPHILS # BLD AUTO: 0.06 10*3/MM3 (ref 0–0.2)
BASOPHILS NFR BLD AUTO: 0.6 % (ref 0–1.5)
BILIRUB SERPL-MCNC: 0.6 MG/DL (ref 0.2–1.2)
BUN BLD-MCNC: 10 MG/DL (ref 6–20)
BUN/CREAT SERPL: 12.8 (ref 7–25)
CALCIUM SPEC-SCNC: 9.8 MG/DL (ref 8.6–10.5)
CHLORIDE SERPL-SCNC: 97 MMOL/L (ref 98–107)
CHOLEST SERPL-MCNC: 145 MG/DL (ref 0–200)
CO2 SERPL-SCNC: 23.7 MMOL/L (ref 22–29)
CREAT BLD-MCNC: 0.78 MG/DL (ref 0.57–1)
CREAT UR-MCNC: 85 MG/DL
CREAT UR-MCNC: 85 MG/DL
DEPRECATED RDW RBC AUTO: 39.5 FL (ref 37–54)
EOSINOPHIL # BLD AUTO: 0.3 10*3/MM3 (ref 0–0.4)
EOSINOPHIL NFR BLD AUTO: 3.1 % (ref 0.3–6.2)
ERYTHROCYTE [DISTWIDTH] IN BLOOD BY AUTOMATED COUNT: 12.8 % (ref 12.3–15.4)
GFR SERPL CREATININE-BSD FRML MDRD: 76 ML/MIN/1.73
GLOBULIN UR ELPH-MCNC: 2.8 GM/DL
GLUCOSE BLD-MCNC: 389 MG/DL (ref 65–99)
HBA1C MFR BLD: 8.5 % (ref 4.8–5.6)
HCT VFR BLD AUTO: 42.6 % (ref 34–46.6)
HDLC SERPL-MCNC: 41 MG/DL (ref 40–60)
HGB BLD-MCNC: 14.2 G/DL (ref 12–15.9)
IMM GRANULOCYTES # BLD AUTO: 0.03 10*3/MM3 (ref 0–0.05)
IMM GRANULOCYTES NFR BLD AUTO: 0.3 % (ref 0–0.5)
LDLC SERPL CALC-MCNC: 68 MG/DL (ref 0–100)
LDLC/HDLC SERPL: 1.65 {RATIO}
LYMPHOCYTES # BLD AUTO: 2.49 10*3/MM3 (ref 0.7–3.1)
LYMPHOCYTES NFR BLD AUTO: 25.9 % (ref 19.6–45.3)
MCH RBC QN AUTO: 28.7 PG (ref 26.6–33)
MCHC RBC AUTO-ENTMCNC: 33.3 G/DL (ref 31.5–35.7)
MCV RBC AUTO: 86.2 FL (ref 79–97)
MICROALBUMIN/CREAT UR: 44.7 MG/G
MONOCYTES # BLD AUTO: 0.62 10*3/MM3 (ref 0.1–0.9)
MONOCYTES NFR BLD AUTO: 6.5 % (ref 5–12)
NEUTROPHILS # BLD AUTO: 6.11 10*3/MM3 (ref 1.7–7)
NEUTROPHILS NFR BLD AUTO: 63.6 % (ref 42.7–76)
NRBC BLD AUTO-RTO: 0 /100 WBC (ref 0–0.2)
PLATELET # BLD AUTO: 446 10*3/MM3 (ref 140–450)
PMV BLD AUTO: 10.3 FL (ref 6–12)
POTASSIUM BLD-SCNC: 4.2 MMOL/L (ref 3.5–5.2)
PROT SERPL-MCNC: 6.9 G/DL (ref 6–8.5)
PROT UR-MCNC: 21 MG/DL
PROT/CREAT UR: 247.1 MG/G CREA (ref 0–200)
RBC # BLD AUTO: 4.94 10*6/MM3 (ref 3.77–5.28)
SODIUM BLD-SCNC: 136 MMOL/L (ref 136–145)
TRIGL SERPL-MCNC: 181 MG/DL (ref 0–150)
TSH SERPL DL<=0.05 MIU/L-ACNC: 3.28 UIU/ML (ref 0.27–4.2)
VIT B12 BLD-MCNC: 710 PG/ML (ref 211–946)
VLDLC SERPL-MCNC: 36.2 MG/DL (ref 5–40)
WBC NRBC COR # BLD: 9.61 10*3/MM3 (ref 3.4–10.8)

## 2020-06-10 PROCEDURE — 83036 HEMOGLOBIN GLYCOSYLATED A1C: CPT | Performed by: NURSE PRACTITIONER

## 2020-06-10 PROCEDURE — 80061 LIPID PANEL: CPT | Performed by: NURSE PRACTITIONER

## 2020-06-10 PROCEDURE — 85025 COMPLETE CBC W/AUTO DIFF WBC: CPT | Performed by: NURSE PRACTITIONER

## 2020-06-10 PROCEDURE — 36415 COLL VENOUS BLD VENIPUNCTURE: CPT | Performed by: NURSE PRACTITIONER

## 2020-06-10 PROCEDURE — 84156 ASSAY OF PROTEIN URINE: CPT | Performed by: NURSE PRACTITIONER

## 2020-06-10 PROCEDURE — 82607 VITAMIN B-12: CPT | Performed by: NURSE PRACTITIONER

## 2020-06-10 PROCEDURE — 82570 ASSAY OF URINE CREATININE: CPT | Performed by: NURSE PRACTITIONER

## 2020-06-10 PROCEDURE — 84443 ASSAY THYROID STIM HORMONE: CPT | Performed by: NURSE PRACTITIONER

## 2020-06-10 PROCEDURE — 82306 VITAMIN D 25 HYDROXY: CPT | Performed by: NURSE PRACTITIONER

## 2020-06-10 PROCEDURE — 82043 UR ALBUMIN QUANTITATIVE: CPT | Performed by: NURSE PRACTITIONER

## 2020-06-10 PROCEDURE — 80053 COMPREHEN METABOLIC PANEL: CPT | Performed by: NURSE PRACTITIONER

## 2020-06-11 ENCOUNTER — OFFICE VISIT (OUTPATIENT)
Dept: ENDOCRINOLOGY | Facility: CLINIC | Age: 60
End: 2020-06-11

## 2020-06-11 VITALS
HEIGHT: 68 IN | HEART RATE: 82 BPM | SYSTOLIC BLOOD PRESSURE: 132 MMHG | WEIGHT: 248.5 LBS | DIASTOLIC BLOOD PRESSURE: 86 MMHG | OXYGEN SATURATION: 100 % | BODY MASS INDEX: 37.66 KG/M2

## 2020-06-11 DIAGNOSIS — I10 ESSENTIAL HYPERTENSION: ICD-10-CM

## 2020-06-11 DIAGNOSIS — IMO0002 UNCONTROLLED TYPE 2 DIABETES MELLITUS WITH NEUROLOGIC COMPLICATION, WITH LONG-TERM CURRENT USE OF INSULIN: ICD-10-CM

## 2020-06-11 DIAGNOSIS — E55.9 VITAMIN D DEFICIENCY: ICD-10-CM

## 2020-06-11 DIAGNOSIS — E78.2 MIXED HYPERLIPIDEMIA: Primary | ICD-10-CM

## 2020-06-11 PROCEDURE — 99214 OFFICE O/P EST MOD 30 MIN: CPT | Performed by: NURSE PRACTITIONER

## 2020-06-11 RX ORDER — BLOOD-GLUCOSE METER
1 KIT MISCELLANEOUS DAILY
Qty: 1 EACH | Refills: 0 | Status: SHIPPED | OUTPATIENT
Start: 2020-06-11

## 2020-06-11 NOTE — PROGRESS NOTES
Subjective    Ariana Martinez is a 60 y.o. female. she is here today for follow-up.    History of Present Illness     IN OFFICE VISIT         Reason -diabetes         Duration/Timing: Diabetes mellitus type 2, Age at onset of diabetes: 26 years years, Onset of symptoms gradual  timing - constant     qualtiy - uncontrolled     severity - moderate     patient broke left foot   -----------------------     Severity (Complications/Hospitalizations)  Secondary Macrovascular Complications: CAD, No CVA, No PAD     2 stents placed in feb. 2020         Secondary Microvascular Complications: No Diabetic Nephropathy, No Diabetic Retinopathy, Diabetic Neuropathy     Context  Diabetes Regimen: Insulin, Oral Medications             Lab Results   Component Value Date    HGBA1C 8.50 (H) 06/10/2020             Blood Glucose Readings     Off  dexcom sensor --- due to finances       RUNNING HIGHER                    Diet  variable carb intake  Exercise: Exercises  walking     Associated Signs/Symptoms  Hyperglycemic Symptoms: No polyuria, No polydipsia, No polyphagia, No weight gain  Hypoglycemic Episodes: Documented symptomatic hypoglycemia, Seizures and syncope related to hypoglycemia           The following portions of the patient's history were reviewed and updated as appropriate:   Past Medical History:   Diagnosis Date   • Angina, class IV (CMS/HCC)    • Anxiety    • Anxiety and depression    • Benign paroxysmal positional vertigo    • Bladder disorder     has bladder stimulator   • Carpal tunnel syndrome    • Chronic pain    • Coronary atherosclerosis     hx CABG 2005.  is followed by Dr Kwon   • Depression    • Diabetes mellitus (CMS/HCC)     Type 2, controlled   • Diabetic polyneuropathy (CMS/HCC)    • Elevated cholesterol    • Female stress incontinence    • Foot pain, left    • Full dentures    • Gastroparesis    • GERD (gastroesophageal reflux disease)    • Hyperlipidemia    • Hypertension    • Low back pain    •  Malaise and fatigue    • Multiple joint pain    • Obesity     Refuses to be weighed   • Otalgia     Both   • Perforation of tympanic membrane     Left   • Postoperative wound infection    • Vitamin D deficiency    • Wears glasses     reading     Past Surgical History:   Procedure Laterality Date   • ABDOMINAL SURGERY     • ANGIOPLASTY      coronary   • BREAST BIOPSY Right    • CARDIAC CATHETERIZATION     • CARDIAC CATHETERIZATION N/A 6/20/2017    Procedure: Right Heart Cath;  Surgeon: Can Kwon MD PhD;  Location: [facility] CATH INVASIVE LOCATION;  Service:    • CARDIAC CATHETERIZATION N/A 2/18/2020    Procedure: Left Heart Cath;  Surgeon: Catalina Cooper MD;  Location: [facility] CATH INVASIVE LOCATION;  Service: Cardiology;  Laterality: N/A;   • CARPAL TUNNEL RELEASE     • CHOLECYSTECTOMY     • CORONARY ARTERY BYPASS GRAFT  2005   • ENDOSCOPY N/A 10/19/2018    Procedure: ESOPHAGOGASTRODUODENOSCOPY possible dilation;  Surgeon: Julián Maldonado MD;  Location: [facility] ENDOSCOPY;  Service: Gastroenterology   • ENDOSCOPY AND COLONOSCOPY     • FOOT SURGERY      Toes   • FOOT SURGERY     • GASTRIC BANDING      Revision, laparoscopic   • HYSTERECTOMY     • MOUTH SURGERY     • SALPINGO OOPHORECTOMY     • SHOULDER SURGERY     • SUBTALAR ARTHRODESIS Left 1/16/2019    Procedure: LEFT FOOT HARDWARE REMOVAL, FIFTH METATARSAL , OPEN REDUCTION INTERNAL FIXATION, CALCANEAL OSTEOTOMY;  Surgeon: Ignacio Lord DPM;  Location: [facility] OR;  Service: Podiatry   • SUBTALAR ARTHRODESIS Left 10/16/2019    Procedure: foot hardware removal, subtalar joint fusion  possible de/reattachment of achilles tendon        (c-arm);  Surgeon: Ignacio Lord DPM;  Location: [facility] OR;  Service: Podiatry   • TRANSESOPHAGEAL ECHOCARDIOGRAM (LAMONTE)      With color flow     Family History   Problem Relation Age of Onset   • Diabetes Other    • Heart disease Other    • Hypertension Other    • Heart disease Mother    • Stroke Mother    •  Hypertension Mother    • Diabetes Sister    • Heart disease Sister    • Hypertension Sister    • Heart disease Sister    • Diabetes Sister    • Hypertension Sister    • Diabetes Sister    • Diabetes Sister    • Diabetes Sister    • Diabetes Sister      OB History        0    Para   0    Term   0       0    AB   0    Living   0       SAB   0    TAB   0    Ectopic   0    Molar        Multiple   0    Live Births                  Current Outpatient Medications   Medication Sig Dispense Refill   • ARIPiprazole (ABILIFY) 15 MG tablet Take 1 tablet by mouth Daily. 90 tablet 1   • aspirin 81 MG chewable tablet Chew 81 mg daily.     • atorvastatin (LIPITOR) 80 MG tablet Take 1 tablet by mouth Daily. (Patient taking differently: Take 40 mg by mouth Daily.) 90 tablet 1   • BD SHARPS CONTAINER HOME misc 1 each Take As Directed. 1 each 0   • Blood Glucose Monitoring Suppl (ONE TOUCH ULTRA MINI) w/Device kit USE AS DIRECTED TO CHECK BLOOD SUGAR 1 each 0   • Calcium Citrate-Vitamin D (CITRACAL/VITAMIN D) 250-200 MG-UNIT tablet Take 2 tablets by mouth 2 (two) times a day.     • clopidogrel (PLAVIX) 75 MG tablet TAKE 1 TABLET BY MOUTH DAILY. 90 tablet 1   • cyanocobalamin 1000 MCG/ML injection Inject 1 mL into the appropriate muscle as directed by prescriber Every 28 (Twenty-Eight) Days. 1 mL 2   • furosemide (LASIX) 40 MG tablet Take 1 tablet by mouth Daily. 90 tablet 1   • gabapentin (NEURONTIN) 400 MG capsule Take 1 capsule by mouth 3 (Three) Times a Day. 90 capsule 2   • GLUCAGON EMERGENCY 1 MG injection USE AS DIRECTED AS NEEDED 1 kit 0   • glucose blood test strip Use to check blood sugar 4 times daily. accucheck 125 each 12   • hydroCHLOROthiazide (HYDRODIURIL) 12.5 MG tablet Take 12.5 mg by mouth Daily.     • HYDROcodone-acetaminophen (Norco) 7.5-325 MG per tablet Take 1 tablet by mouth Every 6 (Six) Hours As Needed for Moderate Pain  or Severe Pain . 120 tablet 0   • Insulin Glargine (BASAGLAR KWIKPEN) 100  "UNIT/ML injection pen Inject 100 units under skin in the the appropriate area as directed every night.  10 pen 2   • Insulin Pen Needle (B-D ULTRAFINE III SHORT PEN) 31G X 8 MM misc USE TO INJECT 4 TIMES A  each 11   • LORazepam (ATIVAN) 0.5 MG tablet TAKE 1 TABLET BY MOUTH EVERY DAY AS NEEDED FOR ANXIETY 30 tablet 0   • meclizine (ANTIVERT) 25 MG tablet Take 1 tablet by mouth 3 (Three) Times a Day As Needed for dizziness. 90 tablet 6   • metoclopramide (REGLAN) 10 MG tablet Take 10 mg by mouth 4 (Four) Times a Day As Needed.     • metoprolol succinate XL (TOPROL-XL) 25 MG 24 hr tablet TAKE 1 TABLET BY MOUTH EVERY DAY 31 tablet 6   • mirtazapine (REMERON) 45 MG tablet TAKE 1 TABLET BY MOUTH EVERY NIGHT. 90 tablet 0   • NOVOLOG FLEXPEN 100 UNIT/ML solution pen-injector sc pen INJECT 60 UNITS BEFORE MEALS 3 TIMES A  mL 3   • ondansetron (ZOFRAN) 8 MG tablet Take 1 tablet by mouth Every 8 (Eight) Hours As Needed for Nausea or Vomiting. 30 tablet 0   • pantoprazole (PROTONIX) 20 MG EC tablet Take 1 tablet by mouth Daily. 90 tablet 3   • sodium-potassium-magnesium sulfates (Suprep Bowel Prep Kit) 17.5-3.13-1.6 GM/177ML solution oral solution Take 1 bottle by mouth Every 12 (Twelve) Hours. 2 bottle 0   • Syringe/Needle, Disp, (Luer Lock Safety Syringes) 25G X 5/8\" 3 ML misc 1 each Daily. 1 Q28 DAYS 1 each 2   • venlafaxine XR (EFFEXOR-XR) 150 MG 24 hr capsule Take 1 capsule by mouth 2 (Two) Times a Day. 90 capsule 1   • vitamin D (ERGOCALCIFEROL) 1.25 MG (65337 UT) capsule capsule TAKE ONE CAPSULE BY MOUTH EVERY SUNDAY. 12 capsule 2   • glucose monitor monitoring kit 1 each Daily. accucheck eve meter, E11.9 1 each 0     No current facility-administered medications for this visit.      Allergies   Allergen Reactions   • Seroquel [Quetiapine] Anaphylaxis   • Avandia [Rosiglitazone] Swelling   • Morphine And Related Hallucinations   • Oxycodone Hallucinations     Social History     Socioeconomic History   • " "Marital status:      Spouse name: Not on file   • Number of children: Not on file   • Years of education: Not on file   • Highest education level: Not on file   Tobacco Use   • Smoking status: Never Smoker   • Smokeless tobacco: Never Used   Substance and Sexual Activity   • Alcohol use: No   • Drug use: No   • Sexual activity: Defer       Review of Systems  Review of Systems   Constitutional: Negative for activity change, appetite change, diaphoresis and fatigue.   HENT: Negative for facial swelling, sneezing, sore throat, tinnitus, trouble swallowing and voice change.    Eyes: Negative for photophobia, pain, discharge, redness, itching and visual disturbance.   Respiratory: Negative for apnea, cough, choking, chest tightness and shortness of breath.    Cardiovascular: Negative for chest pain, palpitations and leg swelling.   Gastrointestinal: Negative for abdominal distention, abdominal pain, constipation, diarrhea, nausea and vomiting.   Endocrine: Negative for cold intolerance, heat intolerance, polydipsia, polyphagia and polyuria.   Genitourinary: Negative for difficulty urinating, dysuria, frequency, hematuria and urgency.   Musculoskeletal: Negative for arthralgias, back pain, gait problem, joint swelling, myalgias, neck pain and neck stiffness.   Skin: Negative for color change, pallor, rash and wound.   Neurological: Negative for dizziness, tremors, weakness, light-headedness, numbness and headaches.   Hematological: Negative for adenopathy. Does not bruise/bleed easily.   Psychiatric/Behavioral: Negative for behavioral problems, confusion and sleep disturbance.        Objective    /86   Pulse 82   Ht 172.7 cm (68\")   Wt 113 kg (248 lb 8 oz)   SpO2 100%   BMI 37.78 kg/m²   Physical Exam   Constitutional: She is oriented to person, place, and time. She appears well-developed and well-nourished. No distress.   HENT:   Head: Normocephalic and atraumatic.   Right Ear: External ear normal. "   Left Ear: External ear normal.   Nose: Nose normal.   Eyes: Pupils are equal, round, and reactive to light. Conjunctivae and EOM are normal.   Neck: Normal range of motion. Neck supple. No tracheal deviation present. No thyromegaly present.   Cardiovascular: Normal rate, regular rhythm and normal heart sounds.   No murmur heard.  Pulmonary/Chest: Effort normal and breath sounds normal. No respiratory distress. She has no wheezes.   Abdominal: Soft. Bowel sounds are normal. There is no tenderness. There is no rebound and no guarding.   Musculoskeletal: Normal range of motion. She exhibits no edema, tenderness or deformity.   Boot on left foot    Neurological: She is alert and oriented to person, place, and time. No cranial nerve deficit.   Skin: Skin is warm and dry. No rash noted.   Psychiatric: She has a normal mood and affect. Her behavior is normal. Judgment and thought content normal.       Lab Review  Glucose (mg/dL)   Date Value   06/10/2020 389 (H)   03/09/2020 373 (H)   02/19/2020 145 (H)     Glucose, Arterial (mmol/L)   Date Value   10/14/2017 155     Sodium (mmol/L)   Date Value   06/10/2020 136   03/09/2020 141   02/19/2020 142     Potassium (mmol/L)   Date Value   06/10/2020 4.2   03/09/2020 3.5   02/19/2020 4.2     Chloride (mmol/L)   Date Value   06/10/2020 97 (L)   03/09/2020 102   02/19/2020 109 (H)     CO2 (mmol/L)   Date Value   06/10/2020 23.7   03/09/2020 25.0   02/19/2020 23.0     BUN (mg/dL)   Date Value   06/10/2020 10   03/09/2020 12   02/19/2020 17     Creatinine (mg/dL)   Date Value   06/10/2020 0.78   03/09/2020 1.00   02/19/2020 0.76     Hemoglobin A1C (%)   Date Value   06/10/2020 8.50 (H)   03/09/2020 9.12 (H)   02/19/2020 9.40 (H)     Triglycerides (mg/dL)   Date Value   06/10/2020 181 (H)   03/09/2020 160 (H)   02/19/2020 106     LDL Cholesterol  (mg/dL)   Date Value   06/10/2020 68   03/09/2020 80   02/19/2020 67       Assessment/Plan      1. Mixed hyperlipidemia    2. Essential  hypertension    3. Vitamin D deficiency    4. 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, with long-term current use of insulin (CMS/[facility])    .    Medications prescribed:  Outpatient Encounter Medications as of 6/11/2020   Medication Sig Dispense Refill   • ARIPiprazole (ABILIFY) 15 MG tablet Take 1 tablet by mouth Daily. 90 tablet 1   • aspirin 81 MG chewable tablet Chew 81 mg daily.     • atorvastatin (LIPITOR) 80 MG tablet Take 1 tablet by mouth Daily. (Patient taking differently: Take 40 mg by mouth Daily.) 90 tablet 1   • BD SHARPS CONTAINER HOME misc 1 each Take As Directed. 1 each 0   • Blood Glucose Monitoring Suppl (ONE TOUCH ULTRA MINI) w/Device kit USE AS DIRECTED TO CHECK BLOOD SUGAR 1 each 0   • Calcium Citrate-Vitamin D (CITRACAL/VITAMIN D) 250-200 MG-UNIT tablet Take 2 tablets by mouth 2 (two) times a day.     • clopidogrel (PLAVIX) 75 MG tablet TAKE 1 TABLET BY MOUTH DAILY. 90 tablet 1   • cyanocobalamin 1000 MCG/ML injection Inject 1 mL into the appropriate muscle as directed by prescriber Every 28 (Twenty-Eight) Days. 1 mL 2   • furosemide (LASIX) 40 MG tablet Take 1 tablet by mouth Daily. 90 tablet 1   • gabapentin (NEURONTIN) 400 MG capsule Take 1 capsule by mouth 3 (Three) Times a Day. 90 capsule 2   • GLUCAGON EMERGENCY 1 MG injection USE AS DIRECTED AS NEEDED 1 kit 0   • glucose blood test strip Use to check blood sugar 4 times daily. accucheck 125 each 12   • hydroCHLOROthiazide (HYDRODIURIL) 12.5 MG tablet Take 12.5 mg by mouth Daily.     • HYDROcodone-acetaminophen (Norco) 7.5-325 MG per tablet Take 1 tablet by mouth Every 6 (Six) Hours As Needed for Moderate Pain  or Severe Pain . 120 tablet 0   • Insulin Glargine (BASAGLAR KWIKPEN) 100 UNIT/ML injection pen Inject 100 units under skin in the the appropriate area as directed every night.  10 pen 2   • Insulin Pen Needle (B-D ULTRAFINE III SHORT PEN) 31G X 8 MM misc USE TO INJECT 4 TIMES A  each 11   •  "LORazepam (ATIVAN) 0.5 MG tablet TAKE 1 TABLET BY MOUTH EVERY DAY AS NEEDED FOR ANXIETY 30 tablet 0   • meclizine (ANTIVERT) 25 MG tablet Take 1 tablet by mouth 3 (Three) Times a Day As Needed for dizziness. 90 tablet 6   • metoclopramide (REGLAN) 10 MG tablet Take 10 mg by mouth 4 (Four) Times a Day As Needed.     • metoprolol succinate XL (TOPROL-XL) 25 MG 24 hr tablet TAKE 1 TABLET BY MOUTH EVERY DAY 31 tablet 6   • mirtazapine (REMERON) 45 MG tablet TAKE 1 TABLET BY MOUTH EVERY NIGHT. 90 tablet 0   • NOVOLOG FLEXPEN 100 UNIT/ML solution pen-injector sc pen INJECT 60 UNITS BEFORE MEALS 3 TIMES A  mL 3   • ondansetron (ZOFRAN) 8 MG tablet Take 1 tablet by mouth Every 8 (Eight) Hours As Needed for Nausea or Vomiting. 30 tablet 0   • pantoprazole (PROTONIX) 20 MG EC tablet Take 1 tablet by mouth Daily. 90 tablet 3   • sodium-potassium-magnesium sulfates (Suprep Bowel Prep Kit) 17.5-3.13-1.6 GM/177ML solution oral solution Take 1 bottle by mouth Every 12 (Twelve) Hours. 2 bottle 0   • Syringe/Needle, Disp, (Luer Lock Safety Syringes) 25G X 5/8\" 3 ML misc 1 each Daily. 1 Q28 DAYS 1 each 2   • venlafaxine XR (EFFEXOR-XR) 150 MG 24 hr capsule Take 1 capsule by mouth 2 (Two) Times a Day. 90 capsule 1   • vitamin D (ERGOCALCIFEROL) 1.25 MG (25376 UT) capsule capsule TAKE ONE CAPSULE BY MOUTH EVERY SUNDAY. 12 capsule 2   • glucose monitor monitoring kit 1 each Daily. accucheck eve meter, E11.9 1 each 0     No facility-administered encounter medications on file as of 6/11/2020.        Orders placed during this encounter include:  Orders Placed This Encounter   Procedures   • Comprehensive Metabolic Panel   • Hemoglobin A1c   • Lipid Panel   • Protein / Creatinine Ratio, Urine - Urine, Clean Catch   • Microalbumin / Creatinine Urine Ratio - Urine, Clean Catch   • TSH   • Vitamin B12   • Vitamin D 25 Hydroxy   • CBC & Differential     Order Specific Question:   Manual Differential     Answer:   No     Glycemic " Management     Diabetes mellitus not controlled              Lab Results   Component Value Date    HGBA1C 8.50 (H) 06/10/2020             Dexcom sensor ---off due to finances          Basaglar taking 100 units --split dosage and give 55 am and 55 pm         ==================        Novolog      Taking 60 units --        I asked her to watch her mealtime sugars if she is staying above 180 after meals then give 64 instead of 60      Discussed carb counting but states cannot     She will need to look at her meals because 30 units may not be enough for some meals and for others meals to strong     If you eat a meal and give 30 units and your sugar is 200 or higher before the next meal look back at that meal and the next time you eat it either give more insulin or eat less     Also if you have a low after the meal give less insulin the next time you eat that meal         ==================     Jardiance-- stopped         ==================     Metformin 500 mg tablets - caused diarrhea --stopped      ====================     start bydureon - stopped        Victoza stopped due to side effects      Warned of the gastric side effects she does have gastroparesis but she wants to try      Gave a sample she will let me know if she wants an RX called in      If vomiting or abdominal pain stop        januvia 100 mg daily  -stopped      ------------------------------     Lipid Management        on Lipitor   on fenofibrate                 LDL needs to be 70 or less     add zetia       Total Cholesterol   Date Value Ref Range Status   06/10/2020 145 0 - 200 mg/dL Final     Triglycerides   Date Value Ref Range Status   06/10/2020 181 (H) 0 - 150 mg/dL Final     HDL Cholesterol   Date Value Ref Range Status   06/10/2020 41 40 - 60 mg/dL Final     LDL Cholesterol    Date Value Ref Range Status   06/10/2020 68 0 - 100 mg/dL Final                 Blood Pressure Management: Control of blood pressure  on ACEi     stopped the lasix          Microvascular Complication Monitoring:     Neurontin 400 mg po TID -- moderate relief but not complete     Component      Latest Ref Rng & Units 6/10/2020 6/10/2020           8:12 AM  8:12 AM   Protein/Creatinine Ratio      0.0 - 200.0 mg/G Crea 247.1 (H)    Creatinine, Urine      mg/dL 85.0 85.0   Total Protein, Urine      mg/dL 21.0    Microalbumin/Creatinine Ratio      mg/g  44.7   Microalbumin, Urine      mg/dL  3.8           intermittetly takes reglan for gastroparesis     states eye exam was March 2020                  Immunization - last influenza vaccine Oct. 2019        Other Diabetes Related Aspects     TSH abnormal from July to Sept 2014     TSH in the 5 to 7 range        Lab Results   Component Value Date    TSH 3.280 06/10/2020                ---     Continue vitamin d supplement      April 2018     Vitamin d -28         missed doses         Component      Latest Ref Rng & Units 6/10/2020   25 Hydroxy, Vitamin D      30.0 - 100.0 ng/ml 30.1            Lab Results   Component Value Date    [id number] 06/10/2020       Referral to GI      Reason - diarrhea chronic      History of gastroparesis- -takes reglan               4. Follow-up: Return in about 3 months (around 9/11/2020).

## 2020-06-12 ENCOUNTER — INFUSION (OUTPATIENT)
Dept: ONCOLOGY | Facility: HOSPITAL | Age: 60
End: 2020-06-12

## 2020-06-12 VITALS
SYSTOLIC BLOOD PRESSURE: 137 MMHG | RESPIRATION RATE: 18 BRPM | HEART RATE: 74 BPM | DIASTOLIC BLOOD PRESSURE: 74 MMHG | TEMPERATURE: 97.5 F

## 2020-06-12 DIAGNOSIS — E61.1 IRON DEFICIENCY: Primary | ICD-10-CM

## 2020-06-12 DIAGNOSIS — T45.4X5A ADVERSE EFFECT OF IRON, INITIAL ENCOUNTER: ICD-10-CM

## 2020-06-12 PROCEDURE — 25010000002 FERUMOXYTOL 510 MG/17ML SOLUTION 510 MG VIAL: Performed by: NURSE PRACTITIONER

## 2020-06-12 PROCEDURE — 96374 THER/PROPH/DIAG INJ IV PUSH: CPT | Performed by: NURSE PRACTITIONER

## 2020-06-12 RX ORDER — SODIUM CHLORIDE 9 MG/ML
250 INJECTION, SOLUTION INTRAVENOUS ONCE
Status: CANCELLED | OUTPATIENT
Start: 2020-06-18

## 2020-06-12 RX ORDER — SODIUM CHLORIDE 9 MG/ML
250 INJECTION, SOLUTION INTRAVENOUS ONCE
Status: COMPLETED | OUTPATIENT
Start: 2020-06-12 | End: 2020-06-12

## 2020-06-12 RX ADMIN — FERUMOXYTOL 510 MG: 510 INJECTION INTRAVENOUS at 13:15

## 2020-06-12 RX ADMIN — SODIUM CHLORIDE 250 ML: 9 INJECTION, SOLUTION INTRAVENOUS at 13:15

## 2020-06-15 DIAGNOSIS — E78.2 MIXED HYPERLIPIDEMIA: Chronic | ICD-10-CM

## 2020-06-15 RX ORDER — ATORVASTATIN CALCIUM 80 MG/1
TABLET, FILM COATED ORAL
Qty: 90 TABLET | Refills: 1 | Status: SHIPPED | OUTPATIENT
Start: 2020-06-15 | End: 2020-12-22

## 2020-06-21 PROCEDURE — U0003 INFECTIOUS AGENT DETECTION BY NUCLEIC ACID (DNA OR RNA); SEVERE ACUTE RESPIRATORY SYNDROME CORONAVIRUS 2 (SARS-COV-2) (CORONAVIRUS DISEASE [COVID-19]), AMPLIFIED PROBE TECHNIQUE, MAKING USE OF HIGH THROUGHPUT TECHNOLOGIES AS DESCRIBED BY CMS-2020-01-R: HCPCS | Performed by: INTERNAL MEDICINE

## 2020-06-22 LAB
COVID LABCORP PRIORITY: NORMAL
SARS-COV-2 RNA RESP QL NAA+PROBE: NOT DETECTED

## 2020-06-24 ENCOUNTER — APPOINTMENT (OUTPATIENT)
Dept: INTERVENTIONAL RADIOLOGY/VASCULAR | Facility: HOSPITAL | Age: 60
End: 2020-06-24

## 2020-06-24 ENCOUNTER — ANESTHESIA EVENT (OUTPATIENT)
Dept: GASTROENTEROLOGY | Facility: HOSPITAL | Age: 60
End: 2020-06-24

## 2020-06-24 ENCOUNTER — APPOINTMENT (OUTPATIENT)
Dept: ULTRASOUND IMAGING | Facility: HOSPITAL | Age: 60
End: 2020-06-24

## 2020-06-24 ENCOUNTER — ANESTHESIA (OUTPATIENT)
Dept: GASTROENTEROLOGY | Facility: HOSPITAL | Age: 60
End: 2020-06-24

## 2020-06-24 ENCOUNTER — HOSPITAL ENCOUNTER (OUTPATIENT)
Facility: HOSPITAL | Age: 60
Setting detail: HOSPITAL OUTPATIENT SURGERY
Discharge: HOME | End: 2020-06-24
Attending: INTERNAL MEDICINE | Admitting: INTERNAL MEDICINE

## 2020-06-24 VITALS
TEMPERATURE: 96.7 F | BODY MASS INDEX: 37.71 KG/M2 | DIASTOLIC BLOOD PRESSURE: 52 MMHG | WEIGHT: 248 LBS | RESPIRATION RATE: 18 BRPM | HEART RATE: 80 BPM | SYSTOLIC BLOOD PRESSURE: 132 MMHG | OXYGEN SATURATION: 95 %

## 2020-06-24 DIAGNOSIS — R53.83 MALAISE AND FATIGUE: ICD-10-CM

## 2020-06-24 DIAGNOSIS — R53.81 MALAISE AND FATIGUE: ICD-10-CM

## 2020-06-24 DIAGNOSIS — D50.0 IRON DEFICIENCY ANEMIA DUE TO CHRONIC BLOOD LOSS: ICD-10-CM

## 2020-06-24 PROCEDURE — 76937 US GUIDE VASCULAR ACCESS: CPT

## 2020-06-24 PROCEDURE — C1751 CATH, INF, PER/CENT/MIDLINE: HCPCS

## 2020-06-24 PROCEDURE — 45385 COLONOSCOPY W/LESION REMOVAL: CPT | Performed by: INTERNAL MEDICINE

## 2020-06-24 PROCEDURE — 43239 EGD BIOPSY SINGLE/MULTIPLE: CPT | Performed by: INTERNAL MEDICINE

## 2020-06-24 PROCEDURE — 25010000002 PROPOFOL 10 MG/ML EMULSION: Performed by: NURSE ANESTHETIST, CERTIFIED REGISTERED

## 2020-06-24 PROCEDURE — 36410 VNPNXR 3YR/> PHY/QHP DX/THER: CPT

## 2020-06-24 RX ORDER — DEXTROSE AND SODIUM CHLORIDE 5; .45 G/100ML; G/100ML
30 INJECTION, SOLUTION INTRAVENOUS CONTINUOUS PRN
Status: DISCONTINUED | OUTPATIENT
Start: 2020-06-24 | End: 2020-06-24 | Stop reason: HOSPADM

## 2020-06-24 RX ORDER — PROPOFOL 10 MG/ML
VIAL (ML) INTRAVENOUS AS NEEDED
Status: DISCONTINUED | OUTPATIENT
Start: 2020-06-24 | End: 2020-06-24 | Stop reason: SURG

## 2020-06-24 RX ORDER — LIDOCAINE HYDROCHLORIDE 20 MG/ML
INJECTION, SOLUTION EPIDURAL; INFILTRATION; INTRACAUDAL; PERINEURAL AS NEEDED
Status: DISCONTINUED | OUTPATIENT
Start: 2020-06-24 | End: 2020-06-24 | Stop reason: SURG

## 2020-06-24 RX ADMIN — PROPOFOL 20 MG: 10 INJECTION, EMULSION INTRAVENOUS at 15:21

## 2020-06-24 RX ADMIN — PROPOFOL 20 MG: 10 INJECTION, EMULSION INTRAVENOUS at 15:11

## 2020-06-24 RX ADMIN — PROPOFOL 20 MG: 10 INJECTION, EMULSION INTRAVENOUS at 15:19

## 2020-06-24 RX ADMIN — PROPOFOL 70 MG: 10 INJECTION, EMULSION INTRAVENOUS at 15:06

## 2020-06-24 RX ADMIN — PROPOFOL 20 MG: 10 INJECTION, EMULSION INTRAVENOUS at 15:13

## 2020-06-24 RX ADMIN — PROPOFOL 20 MG: 10 INJECTION, EMULSION INTRAVENOUS at 15:17

## 2020-06-24 RX ADMIN — LIDOCAINE HYDROCHLORIDE 100 MG: 20 INJECTION, SOLUTION EPIDURAL; INFILTRATION; INTRACAUDAL; PERINEURAL at 15:06

## 2020-06-24 RX ADMIN — PROPOFOL 20 MG: 10 INJECTION, EMULSION INTRAVENOUS at 15:15

## 2020-06-24 RX ADMIN — PROPOFOL 30 MG: 10 INJECTION, EMULSION INTRAVENOUS at 15:08

## 2020-06-24 RX ADMIN — DEXTROSE AND SODIUM CHLORIDE: 5; 450 INJECTION, SOLUTION INTRAVENOUS at 15:04

## 2020-06-24 NOTE — ANESTHESIA PREPROCEDURE EVALUATION
Anesthesia Evaluation     Patient summary reviewed and Nursing notes reviewed   NPO Solid Status: > 8 hours  NPO Liquid Status: > 8 hours           Airway   Mallampati: II  TM distance: >3 FB  Neck ROM: full  Possible difficult intubation  Dental - normal exam     Pulmonary - normal exam   Cardiovascular - normal exam    (+) hypertension, CAD, CABG, angina, hyperlipidemia,       Neuro/Psych  (+) numbness, psychiatric history,     GI/Hepatic/Renal/Endo    (+) obesity, morbid obesity, GERD,  diabetes mellitus,     Musculoskeletal     Abdominal    Substance History      OB/GYN          Other   arthritis,                      Anesthesia Plan    ASA 3     MAC     intravenous induction     Anesthetic plan, all risks, benefits, and alternatives have been provided, discussed and informed consent has been obtained with: patient.    Plan discussed with CRNA.

## 2020-06-24 NOTE — ANESTHESIA POSTPROCEDURE EVALUATION
Patient: Ariana Martinez    Procedure Summary     Date:  06/24/20 Room / Location:  [facility] ENDOSCOPY 1 / [facility] ENDOSCOPY    Anesthesia Start:  1502 Anesthesia Stop:  1524    Procedures:       ESOPHAGOGASTRODUODENOSCOPY WED appt please (N/A )      COLONOSCOPY (N/A ) Diagnosis:       Iron deficiency anemia due to chronic blood loss      Malaise and fatigue      (Iron deficiency anemia due to chronic blood loss [D50.0])      (Malaise and fatigue [R53.81, R53.83])    Surgeon:  Juilán Maldonado MD Provider:  Rakesh Amador CRNA    Anesthesia Type:  MAC ASA Status:  3          Anesthesia Type: MAC    Vitals  No vitals data found for the desired time range.          Post Anesthesia Care and Evaluation    Patient location during evaluation: bedside  Patient participation: complete - patient participated  Level of consciousness: awake and alert  Pain score: 0  Pain management: adequate  Airway patency: patent  Anesthetic complications: No anesthetic complications  PONV Status: none  Cardiovascular status: acceptable  Respiratory status: acceptable  Hydration status: acceptable

## 2020-06-29 ENCOUNTER — TELEPHONE (OUTPATIENT)
Dept: PODIATRY | Facility: CLINIC | Age: 60
End: 2020-06-29

## 2020-06-29 ENCOUNTER — TELEPHONE (OUTPATIENT)
Dept: FAMILY MEDICINE CLINIC | Facility: CLINIC | Age: 60
End: 2020-06-29

## 2020-06-29 DIAGNOSIS — M54.41 CHRONIC RIGHT-SIDED LOW BACK PAIN WITH RIGHT-SIDED SCIATICA: Chronic | ICD-10-CM

## 2020-06-29 DIAGNOSIS — G89.29 CHRONIC RIGHT-SIDED LOW BACK PAIN WITH RIGHT-SIDED SCIATICA: Chronic | ICD-10-CM

## 2020-06-29 LAB
LAB AP CASE REPORT: NORMAL
PATH REPORT.FINAL DX SPEC: NORMAL

## 2020-06-29 RX ORDER — ONDANSETRON HYDROCHLORIDE 8 MG/1
8 TABLET, FILM COATED ORAL EVERY 8 HOURS PRN
Qty: 30 TABLET | Refills: 0 | Status: SHIPPED | OUTPATIENT
Start: 2020-06-29 | End: 2020-07-21

## 2020-06-29 RX ORDER — HYDROCODONE BITARTRATE AND ACETAMINOPHEN 7.5; 325 MG/1; MG/1
1 TABLET ORAL EVERY 6 HOURS PRN
Qty: 120 TABLET | Refills: 0 | Status: SHIPPED | OUTPATIENT
Start: 2020-06-29 | End: 2020-08-03 | Stop reason: SDUPTHER

## 2020-06-29 NOTE — TELEPHONE ENCOUNTER
Patient seen in office every 3 months for chronic pain requiring opiate pain medication. Compliant with medication, visits with no adverse effects noted. LESTER and UDS current and appropriate. Patient called requesting scheduled refill at appropriate interval. Patient understands the risks associated with this controlled medication, including tolerance and addiction.  Patient also agrees to only obtain this medication from me, and not from a another provider, unless that provider is covering for me in my absence.  Patient also agrees to be compliant in dosing, and not self adjust the dose of medication.  A signed controlled substance agreement is on file, and the patient has received a controlled substance education sheet at this a previous visit.  The patient has also signed a consent for treatment with a controlled substance as per [facility] policy. LESTER was obtained.   Refill sent for hydrocodone.     This document has been electronically signed by BEBE Palomino on June 29, 2020 17:30

## 2020-06-30 ENCOUNTER — OFFICE VISIT (OUTPATIENT)
Dept: GASTROENTEROLOGY | Facility: CLINIC | Age: 60
End: 2020-06-30

## 2020-06-30 VITALS
WEIGHT: 254.2 LBS | HEART RATE: 93 BPM | BODY MASS INDEX: 38.52 KG/M2 | HEIGHT: 68 IN | DIASTOLIC BLOOD PRESSURE: 67 MMHG | SYSTOLIC BLOOD PRESSURE: 169 MMHG

## 2020-06-30 DIAGNOSIS — K29.90 GASTRITIS AND DUODENITIS: ICD-10-CM

## 2020-06-30 DIAGNOSIS — D12.3 BENIGN NEOPLASM OF TRANSVERSE COLON: Primary | ICD-10-CM

## 2020-06-30 DIAGNOSIS — D12.5 BENIGN NEOPLASM OF SIGMOID COLON: ICD-10-CM

## 2020-06-30 DIAGNOSIS — K31.84 GASTROPARESIS: ICD-10-CM

## 2020-06-30 PROCEDURE — 99213 OFFICE O/P EST LOW 20 MIN: CPT | Performed by: NURSE PRACTITIONER

## 2020-06-30 RX ORDER — METOCLOPRAMIDE 10 MG/1
10 TABLET ORAL 4 TIMES DAILY PRN
Qty: 120 TABLET | Refills: 1 | Status: SHIPPED | OUTPATIENT
Start: 2020-06-30 | End: 2020-08-10

## 2020-06-30 NOTE — PATIENT INSTRUCTIONS

## 2020-06-30 NOTE — PROGRESS NOTES
Chief Complaint   Patient presents with   • Follow-up     endo results       Subjective    Ariana Martinez is a 60 y.o. female. she is here today for follow-up.    History of Present Illness  60-year-old female with gastroparesis presents for Endo follow-up.  Denies any pain today.  EGD and colonoscopy were completed due to anemia however recent lab work was rechecked on Kylah 10 and anemia had improved.  Denies any melena or hematochezia.  States fatigue has improved somewhat.  EGD noted normal esophagus, gastritis and normal duodenum.  Duodenal biopsy noted chronic duodenitis.  Antrum biopsy noted reactive changes but negative for H. pylori, dysplasia or malignancy.  Colonoscopy had adequate prep.  2 small polyps were found from the sigmoid and transverse colon which were fully removed.  Sigmoid colon polyp was tubulovillous adenoma negative for high-grade dysplasia or malignancy.  Transverse colon polyp was tubular adenoma negative for high-grade dysplasia or malignancy.  Patient would also like refill of Reglan.  She has gastroparesis and takes medication with begins to have symptoms of nausea vomiting.  Reports only takes medication for 1 to 2 weeks at a time until symptoms improve.       The following portions of the patient's history were reviewed and updated as appropriate:   Past Medical History:   Diagnosis Date   • Angina, class IV (CMS/HCC)    • Anxiety    • Anxiety and depression    • Benign paroxysmal positional vertigo    • Bladder disorder     has bladder stimulator   • Carpal tunnel syndrome    • Chronic pain    • Coronary atherosclerosis     hx CABG 2005.  is followed by Dr wKon   • Depression    • Diabetes mellitus (CMS/HCC)     Type 2, controlled   • Diabetic polyneuropathy (CMS/HCC)    • Elevated cholesterol    • Female stress incontinence    • Foot pain, left    • Full dentures    • Gastroparesis    • GERD (gastroesophageal reflux disease)    • Hyperlipidemia    • Hypertension    • Low  back pain    • Malaise and fatigue    • Multiple joint pain    • Obesity     Refuses to be weighed   • Otalgia     Both   • Perforation of tympanic membrane     Left   • Postoperative wound infection    • Vitamin D deficiency    • Wears glasses     reading     Past Surgical History:   Procedure Laterality Date   • ABDOMINAL SURGERY     • ANGIOPLASTY      coronary   • BREAST BIOPSY Right    • CARDIAC CATHETERIZATION     • CARDIAC CATHETERIZATION N/A 6/20/2017    Procedure: Right Heart Cath;  Surgeon: Can Kwon MD PhD;  Location: [facility] CATH INVASIVE LOCATION;  Service:    • CARDIAC CATHETERIZATION N/A 2/18/2020    Procedure: Left Heart Cath;  Surgeon: Catalina Cooper MD;  Location: Health system CATH INVASIVE LOCATION;  Service: Cardiology;  Laterality: N/A;   • CARPAL TUNNEL RELEASE     • CHOLECYSTECTOMY     • COLONOSCOPY N/A 6/24/2020    Procedure: COLONOSCOPY;  Surgeon: Julián Maldonado MD;  Location: Health system ENDOSCOPY;  Service: Gastroenterology;  Laterality: N/A;   • CORONARY ARTERY BYPASS GRAFT  2005   • ENDOSCOPY N/A 10/19/2018    Procedure: ESOPHAGOGASTRODUODENOSCOPY possible dilation;  Surgeon: Julián Maldonado MD;  Location: Health system ENDOSCOPY;  Service: Gastroenterology   • ENDOSCOPY N/A 6/24/2020    Procedure: ESOPHAGOGASTRODUODENOSCOPY WED appt please;  Surgeon: Julián Maldonado MD;  Location: Health system ENDOSCOPY;  Service: Gastroenterology;  Laterality: N/A;   • ENDOSCOPY AND COLONOSCOPY     • FOOT SURGERY      Toes   • FOOT SURGERY     • GASTRIC BANDING      Revision, laparoscopic   • HYSTERECTOMY     • MOUTH SURGERY     • SALPINGO OOPHORECTOMY     • SHOULDER SURGERY     • SUBTALAR ARTHRODESIS Left 1/16/2019    Procedure: LEFT FOOT HARDWARE REMOVAL, FIFTH METATARSAL , OPEN REDUCTION INTERNAL FIXATION, CALCANEAL OSTEOTOMY;  Surgeon: Ignacio Lord DPM;  Location: Health system OR;  Service: Podiatry   • SUBTALAR ARTHRODESIS Left 10/16/2019    Procedure: foot hardware removal, subtalar joint fusion   possible de/reattachment of achilles tendon        (c-arm);  Surgeon: Ignacio Lord DPM;  Location: [facility] OR;  Service: Podiatry   • TRANSESOPHAGEAL ECHOCARDIOGRAM (LAMONTE)      With color flow     Family History   Problem Relation Age of Onset   • Diabetes Other    • Heart disease Other    • Hypertension Other    • Heart disease Mother    • Stroke Mother    • Hypertension Mother    • Diabetes Sister    • Heart disease Sister    • Hypertension Sister    • Heart disease Sister    • Diabetes Sister    • Hypertension Sister    • Diabetes Sister    • Diabetes Sister    • Diabetes Sister    • Diabetes Sister      OB History        0    Para   0    Term   0       0    AB   0    Living   0       SAB   0    TAB   0    Ectopic   0    Molar        Multiple   0    Live Births                  Prior to Admission medications    Medication Sig Start Date End Date Taking? Authorizing Provider   ARIPiprazole (ABILIFY) 15 MG tablet Take 1 tablet by mouth Daily. 3/14/19  Yes Francisca Fong MD   aspirin 81 MG chewable tablet Chew 81 mg daily.   Yes Provider, MD Esther   atorvastatin (LIPITOR) 80 MG tablet TAKE 1 TABLET BY MOUTH EVERY DAY 6/15/20  Yes Kimberly Ferguson APRN   BD SHARPS CONTAINER HOME misc 1 each Take As Directed. 18  Yes Francisca Fong MD   Blood Glucose Monitoring Suppl (ONE TOUCH ULTRA MINI) w/Device kit USE AS DIRECTED TO CHECK BLOOD SUGAR 18  Yes Francisca Fong MD   Calcium Citrate-Vitamin D (CITRACAL/VITAMIN D) 250-200 MG-UNIT tablet Take 2 tablets by mouth 2 (two) times a day.   Yes Provider, MD Esther   clopidogrel (PLAVIX) 75 MG tablet TAKE 1 TABLET BY MOUTH DAILY. 2/3/20  Yes Kimberly Ferguson APRN   cyanocobalamin 1000 MCG/ML injection Inject 1 mL into the appropriate muscle as directed by prescriber Every 28 (Twenty-Eight) Days. 20  Yes Kimberly Ferguson APRN   furosemide (LASIX) 40 MG tablet Take 1 tablet by mouth Daily. 20  Yes Marty  BEBE Luu   gabapentin (NEURONTIN) 400 MG capsule Take 1 capsule by mouth 3 (Three) Times a Day. 5/26/20  Yes Kimberly Ferguson APRN   GLUCAGON EMERGENCY 1 MG injection USE AS DIRECTED AS NEEDED 7/28/17  Yes Elvis Gamboa APRN   glucose blood test strip Use to check blood sugar 4 times daily. accucheck 1/30/20  Yes Elvis Gamboa APRN   glucose monitor monitoring kit 1 each Daily. accucheck eve meter, E11.9 6/11/20  Yes Elvis Gamboa APRN   hydroCHLOROthiazide (HYDRODIURIL) 12.5 MG tablet Take 12.5 mg by mouth Daily.   Yes Provider, MD Esther   HYDROcodone-acetaminophen (Norco) 7.5-325 MG per tablet Take 1 tablet by mouth Every 6 (Six) Hours As Needed for Moderate Pain  or Severe Pain . 6/29/20  Yes Kimberly Ferguson APRN   Insulin Glargine (BASAGLAR KWIKPEN) 100 UNIT/ML injection pen Inject 100 units under skin in the the appropriate area as directed every night.  6/9/20  Yes Elvis Gamboa APRN   Insulin Pen Needle (B-D ULTRAFINE III SHORT PEN) 31G X 8 MM misc USE TO INJECT 4 TIMES A DAY 7/17/19  Yes Elvis Gamboa APRN   LORazepam (ATIVAN) 0.5 MG tablet TAKE 1 TABLET BY MOUTH EVERY DAY AS NEEDED FOR ANXIETY 5/21/20  Yes Kimberly Ferguson APRN   meclizine (ANTIVERT) 25 MG tablet Take 1 tablet by mouth 3 (Three) Times a Day As Needed for dizziness. 12/14/17  Yes Evon Hernandez APRN   metoclopramide (REGLAN) 10 MG tablet Take 10 mg by mouth 4 (Four) Times a Day As Needed.   Yes Provider, MD Esther   metoprolol succinate XL (TOPROL-XL) 25 MG 24 hr tablet TAKE 1 TABLET BY MOUTH EVERY DAY 6/1/20  Yes Bill Gibson MD   mirtazapine (REMERON) 45 MG tablet TAKE 1 TABLET BY MOUTH EVERY NIGHT. 7/17/19  Yes Francisca Fong MD   NOVOLOG FLEXPEN 100 UNIT/ML solution pen-injector sc pen INJECT 60 UNITS BEFORE MEALS 3 TIMES A DAY 4/6/20  Yes Baldemar Melendez MD   ondansetron (ZOFRAN) 8 MG tablet Take 1 tablet by mouth Every  "8 (Eight) Hours As Needed for Nausea or Vomiting. 6/29/20  Yes Ferguson, BEBE Luu   pantoprazole (PROTONIX) 20 MG EC tablet Take 1 tablet by mouth Daily. 3/27/20  Yes Ferguson, BEBE Luu   Syringe/Needle, Disp, (Luer Lock Safety Syringes) 25G X 5/8\" 3 ML misc 1 each Daily. 1 Q28 DAYS 4/16/20  Yes Ferguson, BEBE Luu   venlafaxine XR (EFFEXOR-XR) 150 MG 24 hr capsule Take 1 capsule by mouth 2 (Two) Times a Day. 5/19/20  Yes Ferguson, BEBE Luu   vitamin D (ERGOCALCIFEROL) 1.25 MG (17774 UT) capsule capsule TAKE ONE CAPSULE BY MOUTH EVERY SUNDAY. 12/26/19  Yes Ethel Landaverde PA-C     Allergies   Allergen Reactions   • Seroquel [Quetiapine] Anaphylaxis   • Avandia [Rosiglitazone] Swelling   • Morphine And Related Hallucinations   • Oxycodone Hallucinations     Social History     Socioeconomic History   • Marital status:      Spouse name: Not on file   • Number of children: Not on file   • Years of education: Not on file   • Highest education level: Not on file   Tobacco Use   • Smoking status: Never Smoker   • Smokeless tobacco: Never Used   Substance and Sexual Activity   • Alcohol use: No   • Drug use: No   • Sexual activity: Defer       Review of Systems  Review of Systems   Constitutional: Negative for activity change, appetite change, chills, diaphoresis, fatigue, fever and unexpected weight change.   HENT: Negative for sore throat and trouble swallowing.    Respiratory: Negative for shortness of breath.    Gastrointestinal: Positive for abdominal pain. Negative for abdominal distention, anal bleeding, blood in stool, constipation, diarrhea, nausea, rectal pain and vomiting.   Musculoskeletal: Negative for arthralgias.   Skin: Negative for pallor.   Neurological: Negative for light-headedness.        /67 (BP Location: Left arm, Patient Position: Sitting)   Pulse 93   Ht 172.7 cm (68\")   Wt 115 kg (254 lb 3.2 oz)   BMI 38.65 kg/m²     Objective    Physical Exam "   Constitutional: She is oriented to person, place, and time. She appears well-developed and well-nourished. She is cooperative. No distress.   HENT:   Head: Normocephalic and atraumatic.   Neck: Normal range of motion. Neck supple. No thyromegaly present.   Cardiovascular: Normal rate, regular rhythm and normal heart sounds.   Pulmonary/Chest: Effort normal and breath sounds normal. She has no wheezes. She has no rhonchi. She has no rales.   Abdominal: Soft. Normal appearance and bowel sounds are normal. She exhibits no distension. There is no hepatosplenomegaly. There is no tenderness. There is no rigidity and no guarding. No hernia.   Lymphadenopathy:     She has no cervical adenopathy.   Neurological: She is alert and oriented to person, place, and time.   Skin: Skin is warm, dry and intact. No rash noted. No pallor.   Psychiatric: She has a normal mood and affect. Her speech is normal.     Admission on 06/24/2020, Discharged on 06/24/2020   Component Date Value Ref Range Status   • SARS-CoV-2, KATHY 06/21/2020 Not Detected  Not Detected Final    This test was developed and its performance characteristics determined  by [facility]. This test has not been FDA cleared or  approved. This test has been authorized by FDA under an Emergency Use  Authorization (EUA). This test is only authorized for the duration of  time the declaration that circumstances exist justifying the  authorization of the emergency use of in vitro diagnostic tests for  detection of SARS-CoV-2 virus and/or diagnosis of COVID-19 infection  under section 564(b)(1) of the Act, 21 U.S.C. 360bbb-3(b)(1), unless  the authorization is terminated or revoked sooner.  When diagnostic testing is negative, the possibility of a false  negative result should be considered in the context of a patient's  recent exposures and the presence of clinical signs and symptoms  consistent with COVID-19. An individual without symptoms of COVID-19  and who is not  shedding SARS-CoV-2 virus would expect to have a  negative (not detected) result in this assay.   • COVID [facility] PRIORITY 06/21/2020 Comment   Final    Received   • Case Report 06/24/2020    Final                    Value:Surgical Pathology Report                         Case: [id number]                                  Authorizing Provider:  Julián Maldonado MD        Collected:           06/24/2020 03:12 PM          Ordering Location:     [facility]             Received:            06/25/2020 06:20 AM                                 [facility]                                                     Pathologist:           Mp Chavez MD                                                            Specimens:   1) - Small Intestine, Duodenum, small bowel   cold bx                                               2) - Gastric, Antrum, antrum   cold bx                                                              3) - Large Intestine, Sigmoid Colon, sigmoid polyp  hot snare                                       4) - Large Intestine, Transverse Colon, transverse polyp  hot snare                       • Final Diagnosis 06/24/2020    Final                    Value:This result contains rich text formatting which cannot be displayed here.     Assessment/Plan      1. Benign neoplasm of transverse colon    2. Gastroparesis    3. Gastritis and duodenitis    4. Benign neoplasm of sigmoid colon    .   Repeat colonoscopy in 3 years for surveillance of adenomatous colonic polyps.  We will refill Reglan discussed risk and benefits of medication and patient agrees to utilize medication knowing side effect profile she has tolerated it well in the past.  Follow-up in 6 months for recheck  Continue Protonix daily for gastritis and duodenitis.  Avoid gastric irritants and follow standard antireflux measures    Orders placed during this encounter include:  No orders of the defined types were placed in this encounter.      *  Surgery not found *    Review and/or summary of lab tests, radiology, procedures, medications. Review and summary of old records and obtaining of history. The risks and benefits of my recommendations, as well as other treatment options were discussed with the patient today. Questions were answered.    New Medications Ordered This Visit   Medications   • metoclopramide (REGLAN) 10 MG tablet     Sig: Take 1 tablet by mouth 4 (Four) Times a Day As Needed (Gastroparesis).     Dispense:  120 tablet     Refill:  1       Follow-up: Return in about 6 months (around 12/30/2020) for Recheck.          This document has been electronically signed by BEBE Leach on June 30, 2020 13:31             Results for orders placed or performed during the hospital encounter of 06/24/20   COVID [facility] Priority - Swab, Nasopharynx   Result Value Ref Range    COVID [facility] PRIORITY Comment    COVID-19,[facility] ROUTINE, NP/OP SWAB IN TRANSPORT MEDIA OR ESWAB 72 HR TAT - Swab, Nasopharynx   Result Value Ref Range    SARS-CoV-2, KATHY Not Detected Not Detected   Tissue Pathology Exam   Result Value Ref Range    Case Report       Surgical Pathology Report                         Case: [id number]                                  Authorizing Provider:  Julián Maldonado MD        Collected:           06/24/2020 03:12 PM          Ordering Location:     [facility]             Received:            06/25/2020 06:20 AM                                 [place] [facility]                                                     Pathologist:           Mp Chavez MD                                                            Specimens:   1) - Small Intestine, Duodenum, small bowel   cold bx                                               2) - Gastric, Antrum, antrum   cold bx                                                              3) - Large Intestine, Sigmoid Colon, sigmoid polyp  hot snare                                       4) - Large  Intestine, Transverse Colon, transverse polyp  hot snare                        Final Diagnosis       SEE SCANNED REPORT       Results for orders placed or performed during the hospital encounter of 06/03/20   Duplex Venous Lower Extremity - Left CAR   Result Value Ref Range    Left Common Femoral Augment Y     Left Common Femoral Compress C     Left Saphenofemoral Junction Augment Y     Left Saphenofemoral Junction Compress C     Left Profunda Femoral Augment Y     Left Proximal Femoral Augment Y     Left Proximal Femoral Compress C     Left Mid Femoral Augment Y     Left Mid Femoral Competent Y     Left Mid Femoral Compress C     Left Distal Femoral Augment Y     Left Popliteal Augment Y     Left Posterior Tibial Augment Y     Left Peroneal Augment Y     Left Greater Saph AK Augment Y     Left Greater Saph AK Competent Y     Left Greater Saph AK Compress C     Left Greater Saph BK Augment Y     Left Greater Saph BK Competent Y     Left Greater Saph BK Compress C     Left Distal Femoral Compress C    Results for orders placed or performed in visit on 05/22/20   CBC Auto Differential   Result Value Ref Range    WBC 10.32 3.40 - 10.80 10*3/mm3    RBC 4.51 3.77 - 5.28 10*6/mm3    Hemoglobin 12.9 12.0 - 15.9 g/dL    Hematocrit 38.7 34.0 - 46.6 %    MCV 85.8 79.0 - 97.0 fL    MCH 28.6 26.6 - 33.0 pg    MCHC 33.3 31.5 - 35.7 g/dL    RDW 13.3 12.3 - 15.4 %    RDW-SD 41.4 37.0 - 54.0 fl    MPV 9.5 6.0 - 12.0 fL    Platelets 364 140 - 450 10*3/mm3    Neutrophil % 67.4 42.7 - 76.0 %    Lymphocyte % 22.3 19.6 - 45.3 %    Monocyte % 6.1 5.0 - 12.0 %    Eosinophil % 3.2 0.3 - 6.2 %    Basophil % 0.6 0.0 - 1.5 %    Immature Grans % 0.4 0.0 - 0.5 %    Neutrophils, Absolute 6.96 1.70 - 7.00 10*3/mm3    Lymphocytes, Absolute 2.30 0.70 - 3.10 10*3/mm3    Monocytes, Absolute 0.63 0.10 - 0.90 10*3/mm3    Eosinophils, Absolute 0.33 0.00 - 0.40 10*3/mm3    Basophils, Absolute 0.06 0.00 - 0.20 10*3/mm3    Immature Grans, Absolute 0.04  0.00 - 0.05 10*3/mm3    nRBC 0.0 0.0 - 0.2 /100 WBC   Iron and TIBC   Result Value Ref Range    Iron 56 37 - 145 mcg/dL    Iron Saturation 16 (L) 20 - 50 %    Transferrin 228 200 - 360 mg/dL    TIBC 340 298 - 536 mcg/dL   Folate   Result Value Ref Range    Folate 11.90 4.78 - 24.20 ng/mL   Ferritin   Result Value Ref Range    Ferritin 110.60 13.00 - 150.00 ng/mL   Vitamin B12   Result Value Ref Range    Vitamin B-12 526 211 - 946 pg/mL   Results for orders placed or performed in visit on 03/11/20   CBC Auto Differential   Result Value Ref Range    WBC 9.61 3.40 - 10.80 10*3/mm3    RBC 4.94 3.77 - 5.28 10*6/mm3    Hemoglobin 14.2 12.0 - 15.9 g/dL    Hematocrit 42.6 34.0 - 46.6 %    MCV 86.2 79.0 - 97.0 fL    MCH 28.7 26.6 - 33.0 pg    MCHC 33.3 31.5 - 35.7 g/dL    RDW 12.8 12.3 - 15.4 %    RDW-SD 39.5 37.0 - 54.0 fl    MPV 10.3 6.0 - 12.0 fL    Platelets 446 140 - 450 10*3/mm3    Neutrophil % 63.6 42.7 - 76.0 %    Lymphocyte % 25.9 19.6 - 45.3 %    Monocyte % 6.5 5.0 - 12.0 %    Eosinophil % 3.1 0.3 - 6.2 %    Basophil % 0.6 0.0 - 1.5 %    Immature Grans % 0.3 0.0 - 0.5 %    Neutrophils, Absolute 6.11 1.70 - 7.00 10*3/mm3    Lymphocytes, Absolute 2.49 0.70 - 3.10 10*3/mm3    Monocytes, Absolute 0.62 0.10 - 0.90 10*3/mm3    Eosinophils, Absolute 0.30 0.00 - 0.40 10*3/mm3    Basophils, Absolute 0.06 0.00 - 0.20 10*3/mm3    Immature Grans, Absolute 0.03 0.00 - 0.05 10*3/mm3    nRBC 0.0 0.0 - 0.2 /100 WBC   Protein / Creatinine Ratio, Urine - Urine, Clean Catch   Result Value Ref Range    Protein/Creatinine Ratio, Urine 247.1 (H) 0.0 - 200.0 mg/G Crea    Creatinine, Urine 85.0 mg/dL    Total Protein, Urine 21.0 mg/dL   Microalbumin / Creatinine Urine Ratio - Urine, Clean Catch   Result Value Ref Range    Microalbumin/Creatinine Ratio 44.7 mg/g    Creatinine, Urine 85.0 mg/dL    Microalbumin, Urine 3.8 mg/dL   Vitamin D 25 Hydroxy   Result Value Ref Range    25 Hydroxy, Vitamin D 30.1 30.0 - 100.0 ng/ml   TSH   Result  Value Ref Range    TSH 3.280 0.270 - 4.200 uIU/mL   Hemoglobin A1c   Result Value Ref Range    Hemoglobin A1C 8.50 (H) 4.80 - 5.60 %   Vitamin B12   Result Value Ref Range    Vitamin B-12 710 211 - 946 pg/mL   Lipid Panel   Result Value Ref Range    Total Cholesterol 145 0 - 200 mg/dL    Triglycerides 181 (H) 0 - 150 mg/dL    HDL Cholesterol 41 40 - 60 mg/dL    LDL Cholesterol  68 0 - 100 mg/dL    VLDL Cholesterol 36.2 5 - 40 mg/dL    LDL/HDL Ratio 1.65    Comprehensive Metabolic Panel   Result Value Ref Range    Glucose 389 (H) 65 - 99 mg/dL    BUN 10 6 - 20 mg/dL    Creatinine 0.78 0.57 - 1.00 mg/dL    Sodium 136 136 - 145 mmol/L    Potassium 4.2 3.5 - 5.2 mmol/L    Chloride 97 (L) 98 - 107 mmol/L    CO2 23.7 22.0 - 29.0 mmol/L    Calcium 9.8 8.6 - 10.5 mg/dL    Total Protein 6.9 6.0 - 8.5 g/dL    Albumin 4.10 3.50 - 5.20 g/dL    ALT (SGPT) 15 1 - 33 U/L    AST (SGOT) 14 1 - 32 U/L    Alkaline Phosphatase 144 (H) 39 - 117 U/L    Total Bilirubin 0.6 0.2 - 1.2 mg/dL    eGFR Non African Amer 76 >60 mL/min/1.73    Globulin 2.8 gm/dL    A/G Ratio 1.5 g/dL    BUN/Creatinine Ratio 12.8 7.0 - 25.0    Anion Gap 15.3 (H) 5.0 - 15.0 mmol/L     *Note: Due to a large number of results and/or encounters for the requested time period, some results have not been displayed. A complete set of results can be found in Results Review.

## 2020-07-01 DIAGNOSIS — F41.1 GENERALIZED ANXIETY DISORDER: ICD-10-CM

## 2020-07-02 DIAGNOSIS — F41.1 GENERALIZED ANXIETY DISORDER: ICD-10-CM

## 2020-07-02 DIAGNOSIS — E53.8 CYANOCOBALAMIN DEFICIENCY: ICD-10-CM

## 2020-07-03 ENCOUNTER — TELEPHONE (OUTPATIENT)
Dept: FAMILY MEDICINE CLINIC | Facility: CLINIC | Age: 60
End: 2020-07-03

## 2020-07-03 RX ORDER — LORAZEPAM 0.5 MG/1
0.5 TABLET ORAL DAILY PRN
Qty: 30 TABLET | Refills: 0 | Status: SHIPPED | OUTPATIENT
Start: 2020-07-03 | End: 2020-08-03 | Stop reason: SDUPTHER

## 2020-07-03 RX ORDER — CYANOCOBALAMIN 1000 UG/ML
1000 INJECTION, SOLUTION INTRAMUSCULAR; SUBCUTANEOUS
Qty: 3 ML | Refills: 0 | Status: SHIPPED | OUTPATIENT
Start: 2020-07-03 | End: 2020-07-21

## 2020-07-03 NOTE — TELEPHONE ENCOUNTER
Patient seen in office every 3 months for chronic anxiety requiring benzodiazepine anxiolytic. Compliant with medication, visits with no adverse effects noted. LESTER and UDS current and appropriate. Patient called requesting scheduled refill at appropriate interval. Patient understands the risks associated with this controlled medication, including tolerance and addiction.  Patient also agrees to only obtain this medication from me, and not from a another provider, unless that provider is covering for me in my absence.  Patient also agrees to be compliant in dosing, and not self adjust the dose of medication.  A signed controlled substance agreement is on file, and the patient has received a controlled substance education sheet at this a previous visit.  The patient has also signed a consent for treatment with a controlled substance as per [facility] policy. LESTER was obtained. Refill sent for  Lorazepam.     This document has been electronically signed by BEBE Palomino on July 3, 2020 17:55

## 2020-07-06 RX ORDER — LORAZEPAM 0.5 MG/1
TABLET ORAL
Qty: 30 TABLET | Refills: 0 | OUTPATIENT
Start: 2020-07-06

## 2020-07-09 ENCOUNTER — OFFICE VISIT (OUTPATIENT)
Dept: PODIATRY | Facility: CLINIC | Age: 60
End: 2020-07-09

## 2020-07-09 VITALS — HEART RATE: 78 BPM | BODY MASS INDEX: 38.52 KG/M2 | OXYGEN SATURATION: 99 % | HEIGHT: 68 IN | WEIGHT: 254.2 LBS

## 2020-07-09 DIAGNOSIS — M79.89 LEG SWELLING: ICD-10-CM

## 2020-07-09 DIAGNOSIS — M96.0 NONUNION OF SUBTALAR ARTHRODESIS: Primary | ICD-10-CM

## 2020-07-09 DIAGNOSIS — M21.172 SUBTALAR VARUS, ACQUIRED, LEFT: ICD-10-CM

## 2020-07-09 DIAGNOSIS — Q66.70 PES CAVUS: ICD-10-CM

## 2020-07-09 PROCEDURE — 99213 OFFICE O/P EST LOW 20 MIN: CPT | Performed by: PODIATRIST

## 2020-07-09 NOTE — PROGRESS NOTES
Ariana Luis Martinez  1960  60 y.o. female   PCP: Kimberly Ferguson 05/26/2020  BS - 168 per patient after she eat    Patient presents today for a follow up on the left foot.     07/09/2020      Chief Complaint   Patient presents with   • Left Foot - Follow-up       History of Present Illness    Ms Martinez is a 61 y/o diabetic female who presents for follow-up of left subtalar joint arthrodesis, hardware removal and gastrocnemius recession.  Continues to note some swelling and pain to her left foot and ankle.  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  She has returned to her cam boot which she states helps with her pain however she does still note interning of her foot and overall gait instability.    Past Medical History:   Diagnosis Date   • Angina, class IV (CMS/HCC)    • Anxiety    • Anxiety and depression    • Benign paroxysmal positional vertigo    • Bladder disorder     has bladder stimulator   • Carpal tunnel syndrome    • Chronic pain    • Coronary atherosclerosis     hx CABG 2005.  is followed by Dr Kwon   • Depression    • Diabetes mellitus (CMS/HCC)     Type 2, controlled   • Diabetic polyneuropathy (CMS/HCC)    • Elevated cholesterol    • Female stress incontinence    • Foot pain, left    • Full dentures    • Gastroparesis    • GERD (gastroesophageal reflux disease)    • Hyperlipidemia    • Hypertension    • Low back pain    • Malaise and fatigue    • Multiple joint pain    • Obesity     Refuses to be weighed   • Otalgia     Both   • Perforation of tympanic membrane     Left   • Postoperative wound infection    • Vitamin D deficiency    • Wears glasses     reading         Past Surgical History:   Procedure Laterality Date   • ABDOMINAL SURGERY     • ANGIOPLASTY      coronary   • BREAST BIOPSY Right    • CARDIAC CATHETERIZATION     • CARDIAC CATHETERIZATION N/A 6/20/2017    Procedure: Right Heart Cath;  Surgeon: Can Kwon MD PhD;  Location: [facility]  INVASIVE LOCATION;  Service:    • CARDIAC CATHETERIZATION N/A 2/18/2020    Procedure: Left Heart Cath;  Surgeon: Catalina Cooper MD;  Location: [facility] CATH INVASIVE LOCATION;  Service: Cardiology;  Laterality: N/A;   • CARPAL TUNNEL RELEASE     • CHOLECYSTECTOMY     • COLONOSCOPY N/A 6/24/2020    Procedure: COLONOSCOPY;  Surgeon: Julián Maldonado MD;  Location: [facility] ENDOSCOPY;  Service: Gastroenterology;  Laterality: N/A;   • CORONARY ARTERY BYPASS GRAFT  2005   • ENDOSCOPY N/A 10/19/2018    Procedure: ESOPHAGOGASTRODUODENOSCOPY possible dilation;  Surgeon: Julián Maldonado MD;  Location: [facility] ENDOSCOPY;  Service: Gastroenterology   • ENDOSCOPY N/A 6/24/2020    Procedure: ESOPHAGOGASTRODUODENOSCOPY WED appt please;  Surgeon: Julián Maldonado MD;  Location: [facility] ENDOSCOPY;  Service: Gastroenterology;  Laterality: N/A;   • ENDOSCOPY AND COLONOSCOPY     • FOOT SURGERY      Toes   • FOOT SURGERY     • GASTRIC BANDING      Revision, laparoscopic   • HYSTERECTOMY     • MOUTH SURGERY     • SALPINGO OOPHORECTOMY     • SHOULDER SURGERY     • SUBTALAR ARTHRODESIS Left 1/16/2019    Procedure: LEFT FOOT HARDWARE REMOVAL, FIFTH METATARSAL , OPEN REDUCTION INTERNAL FIXATION, CALCANEAL OSTEOTOMY;  Surgeon: Ignacio Lord DPM;  Location: [facility] OR;  Service: Podiatry   • SUBTALAR ARTHRODESIS Left 10/16/2019    Procedure: foot hardware removal, subtalar joint fusion  possible de/reattachment of achilles tendon        (c-arm);  Surgeon: Ignacio Lord DPM;  Location: [facility] OR;  Service: Podiatry   • TRANSESOPHAGEAL ECHOCARDIOGRAM (LAMONTE)      With color flow         Family History   Problem Relation Age of Onset   • Diabetes Other    • Heart disease Other    • Hypertension Other    • Heart disease Mother    • Stroke Mother    • Hypertension Mother    • Diabetes Sister    • Heart disease Sister    • Hypertension Sister    • Heart disease Sister    • Diabetes Sister    • Hypertension Sister    • Diabetes Sister    •  Diabetes Sister    • Diabetes Sister    • Diabetes Sister          Social History     Socioeconomic History   • Marital status:      Spouse name: Not on file   • Number of children: Not on file   • Years of education: Not on file   • Highest education level: Not on file   Tobacco Use   • Smoking status: Never Smoker   • Smokeless tobacco: Never Used   Substance and Sexual Activity   • Alcohol use: No   • Drug use: No   • Sexual activity: Defer         Current Outpatient Medications   Medication Sig Dispense Refill   • ARIPiprazole (ABILIFY) 15 MG tablet Take 1 tablet by mouth Daily. 90 tablet 1   • aspirin 81 MG chewable tablet Chew 81 mg daily.     • atorvastatin (LIPITOR) 80 MG tablet TAKE 1 TABLET BY MOUTH EVERY DAY 90 tablet 1   • BD SHARPS CONTAINER HOME misc 1 each Take As Directed. 1 each 0   • Blood Glucose Monitoring Suppl (ONE TOUCH ULTRA MINI) w/Device kit USE AS DIRECTED TO CHECK BLOOD SUGAR 1 each 0   • Calcium Citrate-Vitamin D (CITRACAL/VITAMIN D) 250-200 MG-UNIT tablet Take 2 tablets by mouth 2 (two) times a day.     • clopidogrel (PLAVIX) 75 MG tablet TAKE 1 TABLET BY MOUTH DAILY. 90 tablet 1   • cyanocobalamin 1000 MCG/ML injection INJECT 1 ML INTO THE APPROPRIATE MUSCLE AS DIRECTED BY PRESCRIBER EVERY 28 (TWENTY-EIGHT) DAYS. 3 mL 0   • furosemide (LASIX) 40 MG tablet Take 1 tablet by mouth Daily. 90 tablet 1   • gabapentin (NEURONTIN) 400 MG capsule Take 1 capsule by mouth 3 (Three) Times a Day. 90 capsule 2   • GLUCAGON EMERGENCY 1 MG injection USE AS DIRECTED AS NEEDED 1 kit 0   • glucose blood test strip Use to check blood sugar 4 times daily. accucheck 125 each 12   • glucose monitor monitoring kit 1 each Daily. accucheck eve meter, E11.9 1 each 0   • hydroCHLOROthiazide (HYDRODIURIL) 12.5 MG tablet Take 12.5 mg by mouth Daily.     • HYDROcodone-acetaminophen (Norco) 7.5-325 MG per tablet Take 1 tablet by mouth Every 6 (Six) Hours As Needed for Moderate Pain  or Severe Pain . 120  "tablet 0   • Insulin Glargine (BASAGLAR KWIKPEN) 100 UNIT/ML injection pen Inject 100 units under skin in the the appropriate area as directed every night.  10 pen 2   • Insulin Pen Needle (B-D ULTRAFINE III SHORT PEN) 31G X 8 MM misc USE TO INJECT 4 TIMES A  each 11   • LORazepam (ATIVAN) 0.5 MG tablet Take 1 tablet by mouth Daily As Needed for Anxiety. 30 tablet 0   • meclizine (ANTIVERT) 25 MG tablet Take 1 tablet by mouth 3 (Three) Times a Day As Needed for dizziness. 90 tablet 6   • metoclopramide (REGLAN) 10 MG tablet Take 1 tablet by mouth 4 (Four) Times a Day As Needed (Gastroparesis). 120 tablet 1   • metoprolol succinate XL (TOPROL-XL) 25 MG 24 hr tablet TAKE 1 TABLET BY MOUTH EVERY DAY 31 tablet 6   • mirtazapine (REMERON) 45 MG tablet TAKE 1 TABLET BY MOUTH EVERY NIGHT. 90 tablet 0   • NOVOLOG FLEXPEN 100 UNIT/ML solution pen-injector sc pen INJECT 60 UNITS BEFORE MEALS 3 TIMES A  mL 3   • ondansetron (ZOFRAN) 8 MG tablet Take 1 tablet by mouth Every 8 (Eight) Hours As Needed for Nausea or Vomiting. 30 tablet 0   • pantoprazole (PROTONIX) 20 MG EC tablet Take 1 tablet by mouth Daily. 90 tablet 3   • Syringe/Needle, Disp, (Luer Lock Safety Syringes) 25G X 5/8\" 3 ML misc 1 each Daily. 1 Q28 DAYS 1 each 2   • venlafaxine XR (EFFEXOR-XR) 150 MG 24 hr capsule Take 1 capsule by mouth 2 (Two) Times a Day. 90 capsule 1   • vitamin D (ERGOCALCIFEROL) 1.25 MG (57779 UT) capsule capsule TAKE ONE CAPSULE BY MOUTH EVERY SUNDAY. 12 capsule 2     No current facility-administered medications for this visit.      Review of Systems   Constitutional: Negative.    HENT: Negative.    Eyes: Negative.    Respiratory: Negative.    Cardiovascular: Negative.    Gastrointestinal: Negative.    Endocrine: Negative.    Genitourinary: Negative.    Musculoskeletal: Negative.         Left foot pain    Skin: Negative.    Allergic/Immunologic: Negative.    Neurological: Positive for numbness.   Hematological: Negative.  " "  Psychiatric/Behavioral: Negative.          OBJECTIVE    Pulse 78   Ht 172.7 cm (68\")   Wt 115 kg (254 lb 3.2 oz)   SpO2 99%   BMI 38.65 kg/m²       Physical Exam   Constitutional: she appears well-developed and well-nourished.   HEENT: Normocephalic. Atraumatic.  CV: No CP. RRR  Resp: Non-labored respirations.  Psychiatric: she has a normal mood and affect. her behavior is normal.           Lower Extremity Exam:  Neurovascular status intact  Cap Refill time brisk   Moderate edema to left ankle. Mild tenderness with palpation over medial gastrocnemius muscle belly  Left foot and ankle incisions healed.  No erythema.  No fluctuance.    Subtalar joint in mild residual varus, rigid.  Residual pes cavus with no tenderness to palpation of plantar cuboid noted.  Moderate tenderness palpation of plantar medial calcaneal tubercle        CT left foot.     CLINICAL INDICATION: Prior fracture, arthrodesis. Evaluate for  nonunion.        COMPARISON: Left foot June 1, 2020.        TECHNIQUE: Noncontrast study. Multiplanar noncontrast images.  Helical scanning with axial and coronal reformations. Soft  tissue, lung, and bone windows reviewed.     This exam was performed according to our departmental  dose-optimization program, which includes automated exposure  control, adjustment of the mA and/or kV according to patient size  and/or use of iterative reconstruction technique.      CT FINDINGS: Talocalcaneal surgical arthrodesis stabilized by  two large caliber surgical screws.     Significant radiolucency surrounding these screws indicating  loosening.     Widened somewhat irregular articulation between the talus and the  calcaneus. This suggests nonunion, pseudoarthrosis.     IMPRESSION:  As above.     Electronically signed by:  Naveed Taylor MD  6/5/2020 4:11 PM CDT  Workstation: [id number]      Procedures         ASSESSMENT AND PLAN    Ariana was seen today for follow-up.    Diagnoses and all orders for this visit:    Nonunion " of subtalar arthrodesis  -     XR Foot 3+ View Left    Pes cavus    Subtalar varus, acquired, left    Leg swelling        -Radiographs ordered and reviewed.  Patient does have some loosening of 1 of her subtalar joint screws which seems to be causing plantar heel pain.  -Patient also seems to have increasing abduction contracture at the talonavicular joint.  I did discuss continued offloading with cam boot and use of bone stimulator to encourage healing across the arthrodesis site followed by subsequent hardware removal versus revision of her subtalar joint fusion with addition of talonavicular joint fusion to improve correction of residual cavus deformity.  Patient will like to talk to her  about this further.  I advised her to continue her cam boot for now.  Continue bone stimulator.    -Recheck 3 to 4 weeks.          This document has been electronically signed by Ignacio Lord DPM on July 10, 2020 14:46

## 2020-07-20 ENCOUNTER — TELEPHONE (OUTPATIENT)
Dept: PODIATRY | Facility: CLINIC | Age: 60
End: 2020-07-20

## 2020-07-20 NOTE — TELEPHONE ENCOUNTER
JERMAINE ZAYAS, [employer], REQUESTS RICHAR TO CALL HIM BACK RE PT BONE STIMULATOR.  CALL BACK  [phone].  THANK YOU.

## 2020-07-21 DIAGNOSIS — E53.8 CYANOCOBALAMIN DEFICIENCY: ICD-10-CM

## 2020-07-21 RX ORDER — CYANOCOBALAMIN 1000 UG/ML
1000 INJECTION, SOLUTION INTRAMUSCULAR; SUBCUTANEOUS
Qty: 3 ML | Refills: 0 | Status: SHIPPED | OUTPATIENT
Start: 2020-07-21 | End: 2020-12-23

## 2020-07-21 RX ORDER — ONDANSETRON HYDROCHLORIDE 8 MG/1
8 TABLET, FILM COATED ORAL EVERY 8 HOURS PRN
Qty: 18 TABLET | Refills: 1 | Status: SHIPPED | OUTPATIENT
Start: 2020-07-21 | End: 2021-01-04

## 2020-07-23 RX ORDER — CLOPIDOGREL BISULFATE 75 MG/1
TABLET ORAL
Qty: 90 TABLET | Refills: 1 | Status: SHIPPED | OUTPATIENT
Start: 2020-07-23 | End: 2020-12-23

## 2020-08-03 ENCOUNTER — TELEPHONE (OUTPATIENT)
Dept: FAMILY MEDICINE CLINIC | Facility: CLINIC | Age: 60
End: 2020-08-03

## 2020-08-03 DIAGNOSIS — G89.29 CHRONIC RIGHT-SIDED LOW BACK PAIN WITH RIGHT-SIDED SCIATICA: Chronic | ICD-10-CM

## 2020-08-03 DIAGNOSIS — M54.41 CHRONIC RIGHT-SIDED LOW BACK PAIN WITH RIGHT-SIDED SCIATICA: Chronic | ICD-10-CM

## 2020-08-03 DIAGNOSIS — F41.1 GENERALIZED ANXIETY DISORDER: ICD-10-CM

## 2020-08-03 RX ORDER — HYDROCODONE BITARTRATE AND ACETAMINOPHEN 7.5; 325 MG/1; MG/1
1 TABLET ORAL EVERY 6 HOURS PRN
Qty: 120 TABLET | Refills: 0 | Status: SHIPPED | OUTPATIENT
Start: 2020-08-03 | End: 2020-09-03 | Stop reason: SDUPTHER

## 2020-08-03 RX ORDER — LORAZEPAM 0.5 MG/1
0.5 TABLET ORAL DAILY PRN
Qty: 30 TABLET | Refills: 0 | Status: SHIPPED | OUTPATIENT
Start: 2020-08-03 | End: 2020-09-03 | Stop reason: SDUPTHER

## 2020-08-03 NOTE — TELEPHONE ENCOUNTER
Patient seen in office every 3 months for chronic anxiety requiring benzodiazepine anxiolytic. Compliant with medication, visits with no adverse effects noted. LESTER and UDS current and appropriate. Patient called requesting scheduled refill at appropriate interval. Patient understands the risks associated with this controlled medication, including tolerance and addiction.  Patient also agrees to only obtain this medication from me, and not from a another provider, unless that provider is covering for me in my absence.  Patient also agrees to be compliant in dosing, and not self adjust the dose of medication.  A signed controlled substance agreement is on file, and the patient has received a controlled substance education sheet at this a previous visit.  The patient has also signed a consent for treatment with a controlled substance as per [facility] policy. LESTER was obtained. Refill sent for  Lorazepam     This document has been electronically signed by BEBE Palomino on August 3, 2020 17:58

## 2020-08-03 NOTE — TELEPHONE ENCOUNTER
Patient has chronic anxiety requiring benzodiazepine use concurrently with chronic pain requiring opiate pain medication and/ or gabapentin. Patient has been educated regarding potential dangers of oversedation and accidental overdose with use of both medications concurrently. Serial assessments of patient has yielded no sign of oversedation or adverse effects of patient, and he/she is advised and aware to notify my office immediately if symptoms do occur, as well as to discontinue use of benzodiazepine medication and opiate medication immediately if that occurs, pending medical evaluation and advice. Patient has been compliant with use of medications, UDS, visits with no adverse effects noted. Patient understands the risks associated with this controlled medication, including tolerance and addiction.  Patient also agrees to only obtain this medication from me, and not from a another provider, unless that provider is covering for me in my absence.  Patient also agrees to be compliant in dosing, and not self adjust the dose of medication.  A signed controlled substance agreement is on file, and the patient has received a controlled substance education sheet at this a previous visit.  The patient has also signed a consent for treatment with a controlled substance as per [facility] policy. LESTER was obtained. Refills were sent for:  Hydrocodone.     This document has been electronically signed by BEBE Palomino on August 3, 2020 18:03

## 2020-08-10 ENCOUNTER — OFFICE VISIT (OUTPATIENT)
Dept: PODIATRY | Facility: CLINIC | Age: 60
End: 2020-08-10

## 2020-08-10 VITALS — HEIGHT: 68 IN | HEART RATE: 93 BPM | WEIGHT: 254 LBS | OXYGEN SATURATION: 97 % | BODY MASS INDEX: 38.49 KG/M2

## 2020-08-10 DIAGNOSIS — T85.848A PAIN FROM IMPLANTED HARDWARE, INITIAL ENCOUNTER: ICD-10-CM

## 2020-08-10 DIAGNOSIS — R26.81 GAIT INSTABILITY: ICD-10-CM

## 2020-08-10 DIAGNOSIS — Q66.70 PES CAVUS: ICD-10-CM

## 2020-08-10 DIAGNOSIS — M21.172 SUBTALAR VARUS, ACQUIRED, LEFT: ICD-10-CM

## 2020-08-10 DIAGNOSIS — M96.0 NONUNION OF SUBTALAR ARTHRODESIS: Primary | ICD-10-CM

## 2020-08-10 PROCEDURE — 99214 OFFICE O/P EST MOD 30 MIN: CPT | Performed by: PODIATRIST

## 2020-08-10 RX ORDER — VENLAFAXINE HYDROCHLORIDE 150 MG/1
CAPSULE, EXTENDED RELEASE ORAL
Qty: 90 CAPSULE | Refills: 1 | Status: SHIPPED | OUTPATIENT
Start: 2020-08-10 | End: 2020-10-30

## 2020-08-10 RX ORDER — METOCLOPRAMIDE 10 MG/1
10 TABLET ORAL 4 TIMES DAILY PRN
Qty: 120 TABLET | Refills: 1 | Status: SHIPPED | OUTPATIENT
Start: 2020-08-10 | End: 2020-12-09

## 2020-08-10 NOTE — PROGRESS NOTES
Ariana Luis Martinez  1960  60 y.o. female   PCP: Kimberly Ferguson 05/26/2020  BS - 138 per pt.    Patient presents today for a follow up on left foot and to discuss surgery.    08/10/2020        Chief Complaint   Patient presents with   • Left Foot - Follow-up, Discuss surgery       History of Present Illness    Ms Martinez is a 59 y/o diabetic female who presents for follow-up of left subtalar joint arthrodesis, hardware removal and gastrocnemius recession.  Continues to note some swelling and pain to her left foot and ankle.  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  She has been ambulating in her low tide Cam boot to help with gait instability and pain.    Past Medical History:   Diagnosis Date   • Angina, class IV (CMS/HCC)    • Anxiety    • Anxiety and depression    • Benign paroxysmal positional vertigo    • Bladder disorder     has bladder stimulator   • Carpal tunnel syndrome    • Chronic pain    • Coronary atherosclerosis     hx CABG 2005.  is followed by Dr Kwon   • Depression    • Diabetes mellitus (CMS/HCC)     Type 2, controlled   • Diabetic polyneuropathy (CMS/HCC)    • Elevated cholesterol    • Female stress incontinence    • Foot pain, left    • Full dentures    • Gastroparesis    • GERD (gastroesophageal reflux disease)    • Hyperlipidemia    • Hypertension    • Low back pain    • Malaise and fatigue    • Multiple joint pain    • Obesity     Refuses to be weighed   • Otalgia     Both   • Perforation of tympanic membrane     Left   • Postoperative wound infection    • Vitamin D deficiency    • Wears glasses     reading         Past Surgical History:   Procedure Laterality Date   • ABDOMINAL SURGERY     • ANGIOPLASTY      coronary   • BREAST BIOPSY Right    • CARDIAC CATHETERIZATION     • CARDIAC CATHETERIZATION N/A 6/20/2017    Procedure: Right Heart Cath;  Surgeon: Can Kwon MD PhD;  Location: [facility] INVASIVE LOCATION;  Service:    • CARDIAC  CATHETERIZATION N/A 2/18/2020    Procedure: Left Heart Cath;  Surgeon: Catalina Cooper MD;  Location: [facility] CATH INVASIVE LOCATION;  Service: Cardiology;  Laterality: N/A;   • CARPAL TUNNEL RELEASE     • CHOLECYSTECTOMY     • COLONOSCOPY N/A 6/24/2020    Procedure: COLONOSCOPY;  Surgeon: Julián Maldonado MD;  Location: [facility] ENDOSCOPY;  Service: Gastroenterology;  Laterality: N/A;   • CORONARY ARTERY BYPASS GRAFT  2005   • ENDOSCOPY N/A 10/19/2018    Procedure: ESOPHAGOGASTRODUODENOSCOPY possible dilation;  Surgeon: Julián Maldonado MD;  Location: [facility] ENDOSCOPY;  Service: Gastroenterology   • ENDOSCOPY N/A 6/24/2020    Procedure: ESOPHAGOGASTRODUODENOSCOPY WED appt please;  Surgeon: Julián Maldonado MD;  Location: [facility] ENDOSCOPY;  Service: Gastroenterology;  Laterality: N/A;   • ENDOSCOPY AND COLONOSCOPY     • FOOT SURGERY      Toes   • FOOT SURGERY     • GASTRIC BANDING      Revision, laparoscopic   • HYSTERECTOMY     • MOUTH SURGERY     • SALPINGO OOPHORECTOMY     • SHOULDER SURGERY     • SUBTALAR ARTHRODESIS Left 1/16/2019    Procedure: LEFT FOOT HARDWARE REMOVAL, FIFTH METATARSAL , OPEN REDUCTION INTERNAL FIXATION, CALCANEAL OSTEOTOMY;  Surgeon: Ignacio Lord DPM;  Location: [facility] OR;  Service: Podiatry   • SUBTALAR ARTHRODESIS Left 10/16/2019    Procedure: foot hardware removal, subtalar joint fusion  possible de/reattachment of achilles tendon        (c-arm);  Surgeon: Ignacio Lord DPM;  Location: [facility] OR;  Service: Podiatry   • TRANSESOPHAGEAL ECHOCARDIOGRAM (LAMONTE)      With color flow         Family History   Problem Relation Age of Onset   • Diabetes Other    • Heart disease Other    • Hypertension Other    • Heart disease Mother    • Stroke Mother    • Hypertension Mother    • Diabetes Sister    • Heart disease Sister    • Hypertension Sister    • Heart disease Sister    • Diabetes Sister    • Hypertension Sister    • Diabetes Sister    • Diabetes Sister    • Diabetes Sister    •  Diabetes Sister          Social History     Socioeconomic History   • Marital status:      Spouse name: Not on file   • Number of children: Not on file   • Years of education: Not on file   • Highest education level: Not on file   Tobacco Use   • Smoking status: Never Smoker   • Smokeless tobacco: Never Used   Substance and Sexual Activity   • Alcohol use: No   • Drug use: No   • Sexual activity: Defer         Current Outpatient Medications   Medication Sig Dispense Refill   • ARIPiprazole (ABILIFY) 15 MG tablet Take 1 tablet by mouth Daily. 90 tablet 1   • aspirin 81 MG chewable tablet Chew 81 mg daily.     • atorvastatin (LIPITOR) 80 MG tablet TAKE 1 TABLET BY MOUTH EVERY DAY 90 tablet 1   • BD SHARPS CONTAINER HOME misc 1 each Take As Directed. 1 each 0   • Blood Glucose Monitoring Suppl (ONE TOUCH ULTRA MINI) w/Device kit USE AS DIRECTED TO CHECK BLOOD SUGAR 1 each 0   • Calcium Citrate-Vitamin D (CITRACAL/VITAMIN D) 250-200 MG-UNIT tablet Take 2 tablets by mouth 2 (two) times a day.     • clopidogrel (PLAVIX) 75 MG tablet TAKE 1 TABLET BY MOUTH EVERY DAY 90 tablet 1   • cyanocobalamin 1000 MCG/ML injection INJECT 1 ML INTO THE APPROPRIATE MUSCLE AS DIRECTED BY PRESCRIBER EVERY 28 (TWENTY-EIGHT) DAYS. 3 mL 0   • furosemide (LASIX) 40 MG tablet Take 1 tablet by mouth Daily. 90 tablet 1   • gabapentin (NEURONTIN) 400 MG capsule Take 1 capsule by mouth 3 (Three) Times a Day. 90 capsule 2   • GLUCAGON EMERGENCY 1 MG injection USE AS DIRECTED AS NEEDED 1 kit 0   • glucose blood test strip Use to check blood sugar 4 times daily. accucheck 125 each 12   • glucose monitor monitoring kit 1 each Daily. accucheck eve meter, E11.9 1 each 0   • hydroCHLOROthiazide (HYDRODIURIL) 12.5 MG tablet Take 12.5 mg by mouth Daily.     • HYDROcodone-acetaminophen (Norco) 7.5-325 MG per tablet Take 1 tablet by mouth Every 6 (Six) Hours As Needed for Moderate Pain  or Severe Pain . 120 tablet 0   • Insulin Glargine (BASAGLAR  "KWIKPEN) 100 UNIT/ML injection pen Inject 100 units under skin in the the appropriate area as directed every night.  10 pen 2   • Insulin Pen Needle (B-D ULTRAFINE III SHORT PEN) 31G X 8 MM misc USE TO INJECT 4 TIMES A  each 11   • LORazepam (ATIVAN) 0.5 MG tablet Take 1 tablet by mouth Daily As Needed for Anxiety. 30 tablet 0   • meclizine (ANTIVERT) 25 MG tablet Take 1 tablet by mouth 3 (Three) Times a Day As Needed for dizziness. 90 tablet 6   • metoclopramide (REGLAN) 10 MG tablet TAKE 1 TABLET BY MOUTH 4 (FOUR) TIMES A DAY AS NEEDED (GASTROPARESIS). 120 tablet 1   • metoprolol succinate XL (TOPROL-XL) 25 MG 24 hr tablet TAKE 1 TABLET BY MOUTH EVERY DAY 31 tablet 6   • mirtazapine (REMERON) 45 MG tablet TAKE 1 TABLET BY MOUTH EVERY NIGHT. 90 tablet 0   • NOVOLOG FLEXPEN 100 UNIT/ML solution pen-injector sc pen INJECT 60 UNITS BEFORE MEALS 3 TIMES A  mL 3   • ondansetron (ZOFRAN) 8 MG tablet TAKE 1 TABLET BY MOUTH EVERY 8 (EIGHT) HOURS AS NEEDED FOR NAUSEA OR VOMITING. 18 tablet 1   • pantoprazole (PROTONIX) 20 MG EC tablet Take 1 tablet by mouth Daily. 90 tablet 3   • Syringe/Needle, Disp, (Luer Lock Safety Syringes) 25G X 5/8\" 3 ML misc 1 each Daily. 1 Q28 DAYS 1 each 2   • venlafaxine XR (EFFEXOR-XR) 150 MG 24 hr capsule TAKE 1 CAPSULE BY MOUTH TWICE A DAY 90 capsule 1   • vitamin D (ERGOCALCIFEROL) 1.25 MG (98139 UT) capsule capsule TAKE ONE CAPSULE BY MOUTH EVERY SUNDAY. 12 capsule 2     No current facility-administered medications for this visit.      Review of Systems   Constitutional: Negative.    HENT: Negative.    Eyes: Negative.    Respiratory: Negative.    Cardiovascular: Negative.    Gastrointestinal: Negative.    Endocrine: Negative.    Genitourinary: Negative.    Musculoskeletal: Negative.         Left foot pain    Skin: Negative.    Allergic/Immunologic: Negative.    Neurological: Positive for numbness.   Hematological: Negative.    Psychiatric/Behavioral: Negative.  " "        OBJECTIVE    Pulse 93   Ht 172.7 cm (68\")   Wt 115 kg (254 lb)   SpO2 97%   BMI 38.62 kg/m²       Physical Exam   Constitutional: she appears well-developed and well-nourished.   HEENT: Normocephalic. Atraumatic.  CV: No CP. RRR  Resp: Non-labored respirations.  Psychiatric: she has a normal mood and affect. her behavior is normal.           Lower Extremity Exam:  Vascular: DP/PT pulses palpable 1+.   Positive hair growth.   Left ankle perimalleolar edema  Neuro: Protective sensation diminished to lesser toes, b/l.  DTRs intact  Integument: No open wounds or lesions.  Skin quality normal  No masses  Webspaces c/d/i  Musculoskeletal: LE muscle strength 5/5.   Gait mildly antalgic, unassisted  Subtalar joint in mild residual varus, rigid.  Residual pes cavus with no tenderness to palpation of plantar cuboid noted.  Talonavicular joint range of motion limited, adducted  Moderate tenderness palpation of plantar medial calcaneal tubercle  Ankle ROM within normal limits.  No pain or crepitus, b/l        CT left foot.     CLINICAL INDICATION: Prior fracture, arthrodesis. Evaluate for  nonunion.        COMPARISON: Left foot June 1, 2020.        TECHNIQUE: Noncontrast study. Multiplanar noncontrast images.  Helical scanning with axial and coronal reformations. Soft  tissue, lung, and bone windows reviewed.     This exam was performed according to our departmental  dose-optimization program, which includes automated exposure  control, adjustment of the mA and/or kV according to patient size  and/or use of iterative reconstruction technique.      CT FINDINGS: Talocalcaneal surgical arthrodesis stabilized by  two large caliber surgical screws.     Significant radiolucency surrounding these screws indicating  loosening.     Widened somewhat irregular articulation between the talus and the  calcaneus. This suggests nonunion, pseudoarthrosis.     IMPRESSION:  As above.     Electronically signed by:  Naveed Taylor MD  " 6/5/2020 4:11 PM CDT  Workstation: [id number]      Procedures         ASSESSMENT AND PLAN    Ariana was seen today for follow-up and discuss surgery.    Diagnoses and all orders for this visit:    Nonunion of subtalar arthrodesis  -     XR Foot 3+ View Left  -     Case Request    Pes cavus  -     Case Request    Subtalar varus, acquired, left  -     Case Request    Gait instability    Pain from implanted hardware, initial encounter        -Radiographs ordered and reviewed.  Patient does have some loosening of 1 of her subtalar joint screws which seems to be causing plantar heel pain.  -Patient also seems to have increasing adduction contracture at the talonavicular joint.  I did discuss revision of her subtalar joint fusion with addition of talonavicular joint fusion to improve correction of residual cavus deformity.  Patient is ready to proceed but would like to hold off until the end of September.  Discussed all risk, benefits and potential complication as well as suspected postoperative course.  I advised her to continue her cam boot for now.     -Recheck 3 to 4 weeks.          This document has been electronically signed by Ignacio Lord DPM on August 11, 2020 19:26

## 2020-08-11 RX ORDER — BUPIVACAINE HCL/0.9 % NACL/PF 0.1 %
2 PLASTIC BAG, INJECTION (ML) EPIDURAL ONCE
Status: CANCELLED | OUTPATIENT
Start: 2020-09-30 | End: 2020-08-11

## 2020-08-12 PROBLEM — M21.172: Status: ACTIVE | Noted: 2020-08-12

## 2020-08-12 PROBLEM — M96.0 NONUNION OF SUBTALAR ARTHRODESIS: Status: ACTIVE | Noted: 2020-08-12

## 2020-08-18 LAB
BH CV ECHO MEAS - ACS: 1.5 CM
BH CV ECHO MEAS - AO MAX PG (FULL): 2.1 MMHG
BH CV ECHO MEAS - AO MAX PG: 7.2 MMHG
BH CV ECHO MEAS - AO MEAN PG (FULL): 1 MMHG
BH CV ECHO MEAS - AO MEAN PG: 4 MMHG
BH CV ECHO MEAS - AO ROOT AREA (BSA CORRECTED): 1.4
BH CV ECHO MEAS - AO ROOT AREA: 7.5 CM^2
BH CV ECHO MEAS - AO ROOT DIAM: 3.1 CM
BH CV ECHO MEAS - AO V2 MAX: 134 CM/SEC
BH CV ECHO MEAS - AO V2 MEAN: 95.3 CM/SEC
BH CV ECHO MEAS - AO V2 VTI: 31.7 CM
BH CV ECHO MEAS - AVA(I,A): 1.7 CM^2
BH CV ECHO MEAS - AVA(I,D): 1.7 CM^2
BH CV ECHO MEAS - AVA(V,A): 2.1 CM^2
BH CV ECHO MEAS - AVA(V,D): 2.1 CM^2
BH CV ECHO MEAS - BSA(HAYCOCK): 2.4 M^2
BH CV ECHO MEAS - BSA: 2.3 M^2
BH CV ECHO MEAS - BZI_BMI: 38.6 KILOGRAMS/M^2
BH CV ECHO MEAS - BZI_METRIC_HEIGHT: 172.7 CM
BH CV ECHO MEAS - BZI_METRIC_WEIGHT: 115.2 KG
BH CV ECHO MEAS - EDV(CUBED): 110.6 ML
BH CV ECHO MEAS - EDV(MOD-SP2): 87 ML
BH CV ECHO MEAS - EDV(MOD-SP4): 116 ML
BH CV ECHO MEAS - EDV(TEICH): 107.5 ML
BH CV ECHO MEAS - EF(CUBED): 75.6 %
BH CV ECHO MEAS - EF(MOD-SP2): 50.6 %
BH CV ECHO MEAS - EF(MOD-SP4): 62.1 %
BH CV ECHO MEAS - EF(TEICH): 67.4 %
BH CV ECHO MEAS - EPSS: 1 CM
BH CV ECHO MEAS - ESV(CUBED): 27 ML
BH CV ECHO MEAS - ESV(MOD-SP2): 43 ML
BH CV ECHO MEAS - ESV(MOD-SP4): 44 ML
BH CV ECHO MEAS - ESV(TEICH): 35 ML
BH CV ECHO MEAS - FS: 37.5 %
BH CV ECHO MEAS - IVS/LVPW: 1.1
BH CV ECHO MEAS - IVSD: 1.2 CM
BH CV ECHO MEAS - LA DIMENSION: 4.4 CM
BH CV ECHO MEAS - LA/AO: 1.4
BH CV ECHO MEAS - LV DIASTOLIC VOL/BSA (35-75): 51.3 ML/M^2
BH CV ECHO MEAS - LV IVRT: 0.08 SEC
BH CV ECHO MEAS - LV MASS(C)D: 206.4 GRAMS
BH CV ECHO MEAS - LV MASS(C)DI: 91.2 GRAMS/M^2
BH CV ECHO MEAS - LV MAX PG: 5.1 MMHG
BH CV ECHO MEAS - LV MEAN PG: 3 MMHG
BH CV ECHO MEAS - LV SYSTOLIC VOL/BSA (12-30): 19.4 ML/M^2
BH CV ECHO MEAS - LV V1 MAX: 113 CM/SEC
BH CV ECHO MEAS - LV V1 MEAN: 73.2 CM/SEC
BH CV ECHO MEAS - LV V1 VTI: 21.3 CM
BH CV ECHO MEAS - LVIDD: 4.8 CM
BH CV ECHO MEAS - LVIDS: 3 CM
BH CV ECHO MEAS - LVLD AP2: 7.5 CM
BH CV ECHO MEAS - LVLD AP4: 7.9 CM
BH CV ECHO MEAS - LVLS AP2: 6.6 CM
BH CV ECHO MEAS - LVLS AP4: 6.6 CM
BH CV ECHO MEAS - LVOT AREA (M): 2.5 CM^2
BH CV ECHO MEAS - LVOT AREA: 2.5 CM^2
BH CV ECHO MEAS - LVOT DIAM: 1.8 CM
BH CV ECHO MEAS - LVPWD: 1.1 CM
BH CV ECHO MEAS - MR MAX PG: 56.6 MMHG
BH CV ECHO MEAS - MR MAX VEL: 376 CM/SEC
BH CV ECHO MEAS - MV A MAX VEL: 74 CM/SEC
BH CV ECHO MEAS - MV DEC SLOPE: 365 CM/SEC^2
BH CV ECHO MEAS - MV E MAX VEL: 78 CM/SEC
BH CV ECHO MEAS - MV E/A: 1.1
BH CV ECHO MEAS - MV MAX PG: 3.5 MMHG
BH CV ECHO MEAS - MV MEAN PG: 1 MMHG
BH CV ECHO MEAS - MV P1/2T MAX VEL: 87.6 CM/SEC
BH CV ECHO MEAS - MV P1/2T: 70.3 MSEC
BH CV ECHO MEAS - MV V2 MAX: 94.1 CM/SEC
BH CV ECHO MEAS - MV V2 MEAN: 55.4 CM/SEC
BH CV ECHO MEAS - MV V2 VTI: 28.5 CM
BH CV ECHO MEAS - MVA P1/2T LCG: 2.5 CM^2
BH CV ECHO MEAS - MVA(P1/2T): 3.1 CM^2
BH CV ECHO MEAS - MVA(VTI): 1.9 CM^2
BH CV ECHO MEAS - PA MAX PG (FULL): 2.5 MMHG
BH CV ECHO MEAS - PA MAX PG: 4.7 MMHG
BH CV ECHO MEAS - PA MEAN PG (FULL): 2 MMHG
BH CV ECHO MEAS - PA MEAN PG: 3 MMHG
BH CV ECHO MEAS - PA V2 MAX: 108 CM/SEC
BH CV ECHO MEAS - PA V2 MEAN: 75.1 CM/SEC
BH CV ECHO MEAS - PA V2 VTI: 25.1 CM
BH CV ECHO MEAS - RAP SYSTOLE: 3 MMHG
BH CV ECHO MEAS - RV MAX PG: 2.2 MMHG
BH CV ECHO MEAS - RV MEAN PG: 1 MMHG
BH CV ECHO MEAS - RV V1 MAX: 74.4 CM/SEC
BH CV ECHO MEAS - RV V1 MEAN: 54 CM/SEC
BH CV ECHO MEAS - RV V1 VTI: 18.4 CM
BH CV ECHO MEAS - RVSP: 28 MMHG
BH CV ECHO MEAS - SI(AO): 105.8 ML/M^2
BH CV ECHO MEAS - SI(CUBED): 36.9 ML/M^2
BH CV ECHO MEAS - SI(LVOT): 24 ML/M^2
BH CV ECHO MEAS - SI(MOD-SP2): 19.4 ML/M^2
BH CV ECHO MEAS - SI(MOD-SP4): 31.8 ML/M^2
BH CV ECHO MEAS - SI(TEICH): 32.1 ML/M^2
BH CV ECHO MEAS - SV(AO): 239.3 ML
BH CV ECHO MEAS - SV(CUBED): 83.6 ML
BH CV ECHO MEAS - SV(LVOT): 54.2 ML
BH CV ECHO MEAS - SV(MOD-SP2): 44 ML
BH CV ECHO MEAS - SV(MOD-SP4): 72 ML
BH CV ECHO MEAS - SV(TEICH): 72.5 ML
BH CV ECHO MEAS - TR MAX VEL: 249 CM/SEC
BH CV VAS BP LEFT ARM: NORMAL MMHG

## 2020-08-20 ENCOUNTER — OFFICE VISIT (OUTPATIENT)
Dept: FAMILY MEDICINE CLINIC | Facility: CLINIC | Age: 60
End: 2020-08-20

## 2020-08-20 ENCOUNTER — LAB (OUTPATIENT)
Dept: LAB | Facility: OTHER | Age: 60
End: 2020-08-20

## 2020-08-20 VITALS
HEIGHT: 68 IN | TEMPERATURE: 98.4 F | BODY MASS INDEX: 38.19 KG/M2 | OXYGEN SATURATION: 98 % | WEIGHT: 252 LBS | HEART RATE: 81 BPM | RESPIRATION RATE: 18 BRPM | SYSTOLIC BLOOD PRESSURE: 130 MMHG | DIASTOLIC BLOOD PRESSURE: 72 MMHG
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
## 2020-08-20 DIAGNOSIS — E55.9 VITAMIN D DEFICIENCY: ICD-10-CM

## 2020-08-20 DIAGNOSIS — E78.2 MIXED HYPERLIPIDEMIA: ICD-10-CM

## 2020-08-20 DIAGNOSIS — S00.93XA CONTUSION OF HEAD, INITIAL ENCOUNTER: ICD-10-CM

## 2020-08-20 DIAGNOSIS — G89.29 CHRONIC RIGHT-SIDED LOW BACK PAIN WITH RIGHT-SIDED SCIATICA: Chronic | ICD-10-CM

## 2020-08-20 DIAGNOSIS — S50.11XA: ICD-10-CM

## 2020-08-20 DIAGNOSIS — F41.1 GENERALIZED ANXIETY DISORDER: ICD-10-CM

## 2020-08-20 DIAGNOSIS — I10 ESSENTIAL HYPERTENSION: Primary | ICD-10-CM

## 2020-08-20 DIAGNOSIS — M54.41 CHRONIC RIGHT-SIDED LOW BACK PAIN WITH RIGHT-SIDED SCIATICA: Chronic | ICD-10-CM

## 2020-08-20 PROCEDURE — G0480 DRUG TEST DEF 1-7 CLASSES: HCPCS | Performed by: NURSE PRACTITIONER

## 2020-08-20 PROCEDURE — G0481 DRUG TEST DEF 8-14 CLASSES: HCPCS | Performed by: NURSE PRACTITIONER

## 2020-08-20 PROCEDURE — 80307 DRUG TEST PRSMV CHEM ANLYZR: CPT | Performed by: NURSE PRACTITIONER

## 2020-08-20 PROCEDURE — 99214 OFFICE O/P EST MOD 30 MIN: CPT | Performed by: NURSE PRACTITIONER

## 2020-08-20 RX ORDER — HYDROCODONE BITARTRATE AND ACETAMINOPHEN 7.5; 325 MG/1; MG/1
1 TABLET ORAL EVERY 6 HOURS PRN
Qty: 120 TABLET | Refills: 0 | Status: CANCELLED | OUTPATIENT
Start: 2020-08-20

## 2020-08-20 RX ORDER — LORAZEPAM 0.5 MG/1
0.5 TABLET ORAL DAILY PRN
Qty: 30 TABLET | Refills: 0 | Status: CANCELLED | OUTPATIENT
Start: 2020-08-20

## 2020-08-20 RX ORDER — GABAPENTIN 400 MG/1
400 CAPSULE ORAL 3 TIMES DAILY
Qty: 90 CAPSULE | Refills: 2 | Status: SHIPPED | OUTPATIENT
Start: 2020-08-20 | End: 2020-11-12 | Stop reason: SDUPTHER

## 2020-08-20 NOTE — PROGRESS NOTES
Chief Complaint   Patient presents with   • Follow-up     12 weeks   • Fall     fell this am-    • Back Pain   • Peripheral Neuropathy   • Anxiety     Subjective   Ariana Martinez is a 60 y.o. female who presents to the office for routine 12 week follow up of chronic back pain, DM sciatica and anxiety, also to report a fall with injury from this am.     The following portions of the patient's history were reviewed and updated as appropriate: allergies, current medications, past family history, past medical history, past social history, past surgical history and problem list.    History of Present Illness   Diabetic polyneuropathy: usually managed well with gabapentin, due for refill today. Is experiencing significant swelling and increased pain left foot recently and today. Has had surgery on this foot earlier this year with Dr Lord.     Chronic low back pain with right sided sciatica: This is a chronic, ongoing issue that began over 5 years ago. The pain is in her lower back and radiates down her right side. She reports that there is no change in the pain from her previous encounter. Patient rates pain at 4/10 today and says the lowest she experiences is a 4/10. Managed adequately with hydrocodone and gabapentin. Due for refill today, UDS due today.   Last lumbar imaging 9/12/17  IMPRESSION:  1. No acute lumbar spine abnormality     MAURICIO: in excess of what is controlled with mirtazepine and Effexor. Managed with a daily PRN ativan 0.5mg. This has been in use for years and is at lowest possible dosing. Reports good results and while aware of increased risk of accidental overdose or oversedation, denies any adverse effect or oversedation and desires to continue as prescribed. Not due for refill today. Will call when refill due.  FALL WITH INJURY this am: caught her walking boot on something and fell striking forehead on something, thinks maybe a corner of a cabinet, it is bruised. Also injured her elbow (right)  which is bruised and abraded superficially. She did not lose consciousness and has not experienced headache or visual disturbances nor difficulty with cognition since fall.    Parts of most recent relevant visit HPI, ROS  and PE may be carried forward and all are updated as appropriate for current situation.     Past Medical History:   Diagnosis Date   • Angina, class IV (CMS/HCC)    • Anxiety    • Anxiety and depression    • Benign paroxysmal positional vertigo    • Bladder disorder     has bladder stimulator   • Carpal tunnel syndrome    • Chronic pain    • Coronary atherosclerosis     hx CABG 2005.  is followed by Dr Kwon   • Depression    • Diabetes mellitus (CMS/HCC)     Type 2, controlled   • Diabetic polyneuropathy (CMS/HCC)    • Elevated cholesterol    • Female stress incontinence    • Foot pain, left    • Full dentures    • Gastroparesis    • GERD (gastroesophageal reflux disease)    • Hyperlipidemia    • Hypertension    • Low back pain    • Malaise and fatigue    • Multiple joint pain    • Obesity     Refuses to be weighed   • Otalgia     Both   • Perforation of tympanic membrane     Left   • Postoperative wound infection    • Vitamin D deficiency    • Wears glasses     reading          Family History   Problem Relation Age of Onset   • Diabetes Other    • Heart disease Other    • Hypertension Other    • Heart disease Mother    • Stroke Mother    • Hypertension Mother    • Diabetes Sister    • Heart disease Sister    • Hypertension Sister    • Heart disease Sister    • Diabetes Sister    • Hypertension Sister    • Diabetes Sister    • Diabetes Sister    • Diabetes Sister    • Diabetes Sister         Review of Systems   Constitutional: Negative.  Negative for appetite change, chills, fatigue, fever and unexpected weight change.   HENT: Negative.  Negative for congestion, ear pain, rhinorrhea and sore throat.    Eyes: Negative.  Negative for pain.   Respiratory: Negative.  Negative for cough, chest  "tightness and shortness of breath.    Cardiovascular: Negative.  Negative for chest pain and palpitations.   Gastrointestinal: Negative.  Negative for abdominal pain, constipation, diarrhea and nausea.   Endocrine: Negative.    Genitourinary: Negative.  Negative for dysuria.   Musculoskeletal: Positive for arthralgias and back pain. Negative for joint swelling and neck pain.   Skin: Negative.  Negative for color change, pallor, rash and wound.   Allergic/Immunologic: Negative.    Neurological: Positive for numbness (BLE, chronic polyneuropathy includes feet.). Negative for dizziness and headaches.   Hematological: Negative.    Psychiatric/Behavioral: Negative for sleep disturbance and suicidal ideas. The patient is nervous/anxious.    All other systems reviewed and are negative.      Objective   Vitals:    08/20/20 1329   BP: 130/72   BP Location: Left arm   Patient Position: Sitting   Pulse: 81   Resp: 18   Temp: 98.4 °F (36.9 °C)   SpO2: 98%   Weight: 114 kg (252 lb)   Height: 172.7 cm (67.99\")   PainSc:   3   PainLoc: Head     Physical Exam   Constitutional: She is oriented to person, place, and time. She appears well-developed and well-nourished. No distress.   HENT:   Head: Normocephalic and atraumatic.   Eyes: Pupils are equal, round, and reactive to light. Conjunctivae and EOM are normal. Right eye exhibits no discharge. Left eye exhibits no discharge. No scleral icterus.   Neck: Normal range of motion. Neck supple. No JVD present. No tracheal deviation present. No thyromegaly present.   Cardiovascular: Normal rate, regular rhythm, normal heart sounds and intact distal pulses.   No murmur heard.  Pulmonary/Chest: Effort normal and breath sounds normal. No respiratory distress. She has no wheezes. She has no rales.   Abdominal: Soft. Bowel sounds are normal.   Musculoskeletal: She exhibits tenderness (left foot, right lower back). She exhibits no edema.        Back:    Lymphadenopathy:     She has no cervical " adenopathy.   Neurological: She is alert and oriented to person, place, and time. No cranial nerve deficit.   Skin: Skin is warm and dry. Capillary refill takes 2 to 3 seconds. No rash noted. She is not diaphoretic. No erythema. No pallor.   Psychiatric: She has a normal mood and affect. Her behavior is normal. Judgment and thought content normal.   Nursing note and vitals reviewed.      Assessment/Plan   Ariana was seen today for follow-up, fall, back pain, peripheral neuropathy and anxiety.    Diagnoses and all orders for this visit:    Fall with injury, initial encounter  -     Cancel: CT Head Without Contrast; Future  -     Cancel: XR elbow 2 vw right; Future  -     CT Head Without Contrast; Future  -     XR elbow 2 vw right; Future    Chronic right-sided low back pain with right-sided sciatica  Comments:  controlled with Norco  Orders:  -     gabapentin (NEURONTIN) 400 MG capsule; Take 1 capsule by mouth 3 (Three) Times a Day.    Generalized anxiety disorder    Contusion of head, initial encounter  -     Cancel: CT Head Without Contrast; Future  -     CT Head Without Contrast; Future    Contusion, forearm and elbow, right, initial encounter  -     Cancel: XR elbow 2 vw right; Future  -     XR elbow 2 vw right; Future         CT head negative. Discussed with patient today.  Elbow xray negative. Discussed with patient today.   Refill hydrocodone and lorazepam not due today. Refill sent for  gabapentin today.     Patient understands the risks associated with this controlled medication, including tolerance and addiction.  Patient also agrees to only obtain this medication from me, and not from a another provider, unless that provider is covering for me in my absence.  Patient also agrees to be compliant in dosing, and not self adjust the dose of medication.  A signed controlled substance agreement is on file, and the patient has received a controlled substance education sheet at this a previous visit.  The patient  has also signed a consent for treatment with a controlled substance as per [facility] policy. LESTER was obtained.      BEBE Palomino         Return in about 12 weeks (around 11/12/2020).    There are no Patient Instructions on file for this visit.

## 2020-08-21 ENCOUNTER — OFFICE VISIT (OUTPATIENT)
Dept: CARDIOLOGY | Facility: CLINIC | Age: 60
End: 2020-08-21

## 2020-08-21 VITALS
DIASTOLIC BLOOD PRESSURE: 70 MMHG | HEIGHT: 68 IN | OXYGEN SATURATION: 99 % | HEART RATE: 76 BPM | BODY MASS INDEX: 38.13 KG/M2 | WEIGHT: 251.6 LBS | SYSTOLIC BLOOD PRESSURE: 124 MMHG

## 2020-08-21 DIAGNOSIS — E78.2 MIXED HYPERLIPIDEMIA: ICD-10-CM

## 2020-08-21 DIAGNOSIS — I27.20 PULMONARY HYPERTENSION (HCC): Primary | ICD-10-CM

## 2020-08-21 DIAGNOSIS — E66.01 CLASS 3 SEVERE OBESITY DUE TO EXCESS CALORIES WITHOUT SERIOUS COMORBIDITY WITH BODY MASS INDEX (BMI) OF 40.0 TO 44.9 IN ADULT (HCC): ICD-10-CM

## 2020-08-21 DIAGNOSIS — I50.32 CHRONIC HEART FAILURE WITH PRESERVED EJECTION FRACTION (HCC): ICD-10-CM

## 2020-08-21 DIAGNOSIS — I25.10 CORONARY ARTERY DISEASE INVOLVING NATIVE CORONARY ARTERY OF NATIVE HEART WITHOUT ANGINA PECTORIS: ICD-10-CM

## 2020-08-21 DIAGNOSIS — I36.1 NONRHEUMATIC TRICUSPID VALVE REGURGITATION: ICD-10-CM

## 2020-08-21 DIAGNOSIS — I10 ESSENTIAL HYPERTENSION: ICD-10-CM

## 2020-08-21 PROCEDURE — 99214 OFFICE O/P EST MOD 30 MIN: CPT | Performed by: INTERNAL MEDICINE

## 2020-08-21 NOTE — PROGRESS NOTES
Cardiovascular Medicine   Can Kwon M.D., Ph.D., [facility]      The patient returns to cardiovascular clinic for follow-up of the following:    PROBLEM LIST:  1. HFpEF  2. PAH  3. TR  4. MV ASCAD  A. CABG, 2005  -LIMA-LAD: Prior PCI 2.5 x 28mm in 2004  -RCA, 60%  B. [facility], 6/2016  -pLAD: 50%  -Ostial D1-30%  -mLAD: 100%  -dLAD fills via LIMA  -OM1: 60%  -FFR was 0.93  C. [facility], 2/2020 with PTCI LAD  5. Risks: HTN, HLD, DM2        Ariana Martinez is a 60 y.o. female who returns to clinic today in follow-up.  She does have a history of heart failure with preserved ejection fraction and pulmonary venous hypertension.  Concerning this, she has stable dyspnea.  She does have TR.  She is noncompliant much of the time with her diet.  She is currently prescribed Toprol-XL and furosemide.  She's had emergency department visits or inpatient admissions for acute decompensated heart failure.  Weight is stable.  She denies any dizziness, lightheadedness or syncope.  She's had no PND or orthopnea.  Concerning her tricuspid regurgitation, it has remained mild. .  Concerning her coronary artery disease she is status post LIMA to the LAD by CABG in 2005.  She is currently prescribed aspirin, Plavix, Lipitor and Toprol-XL. She was admitted recently with CP. MPS was abnormal. She underwent LHC with Dr. Cooper. She is s/p PTCI. Her LIMA is not patent.  Concerning her hyperlipidemia she remains on a statin therapy.  She is medication compliant.  Denies side effects.  Since her last visit she has had a fall.  She tells me that, otherwise, she is in her normal state of health.  She is due for labs which her PCP ordered.  She did have a 2D echocardiogram prior to her appointment today.  Results were reviewed today. She had surgery on her foot October. She is in need of a revision.       The following portions of the patient's history were reviewed and updated as appropriate: allergies, current medications, past family history, past  medical history, past social history, past surgical history and problem list.      Review of Systems   Cardiovascular: Positive for dyspnea on exertion. Negative for chest pain, claudication, cyanosis, irregular heartbeat, leg swelling, near-syncope, orthopnea, palpitations, paroxysmal nocturnal dyspnea and syncope.   Respiratory: Positive for shortness of breath. Negative for cough, sputum production and wheezing.      family history includes Diabetes in her sister, sister, sister, sister, sister, sister and another family member; Heart disease in her mother, sister, sister, and another family member; Hypertension in her mother, sister, sister, and another family member; Stroke in her mother.   reports that she has never smoked. She has never used smokeless tobacco. She reports that she does not drink alcohol or use drugs.  Allergies   Allergen Reactions   • Seroquel [Quetiapine] Anaphylaxis   • Avandia [Rosiglitazone] Swelling   • Morphine And Related Hallucinations   • Oxycodone Hallucinations       Current Outpatient Medications:   •  ARIPiprazole (ABILIFY) 15 MG tablet, Take 1 tablet by mouth Daily., Disp: 90 tablet, Rfl: 1  •  aspirin 81 MG chewable tablet, Chew 81 mg daily., Disp: , Rfl:   •  atorvastatin (LIPITOR) 80 MG tablet, TAKE 1 TABLET BY MOUTH EVERY DAY, Disp: 90 tablet, Rfl: 1  •  BD SHARPS CONTAINER HOME misc, 1 each Take As Directed., Disp: 1 each, Rfl: 0  •  Blood Glucose Monitoring Suppl (ONE TOUCH ULTRA MINI) w/Device kit, USE AS DIRECTED TO CHECK BLOOD SUGAR, Disp: 1 each, Rfl: 0  •  Calcium Citrate-Vitamin D (CITRACAL/VITAMIN D) 250-200 MG-UNIT tablet, Take 2 tablets by mouth 2 (two) times a day., Disp: , Rfl:   •  clopidogrel (PLAVIX) 75 MG tablet, TAKE 1 TABLET BY MOUTH EVERY DAY, Disp: 90 tablet, Rfl: 1  •  cyanocobalamin 1000 MCG/ML injection, INJECT 1 ML INTO THE APPROPRIATE MUSCLE AS DIRECTED BY PRESCRIBER EVERY 28 (TWENTY-EIGHT) DAYS., Disp: 3 mL, Rfl: 0  •  furosemide (LASIX) 40 MG  tablet, Take 1 tablet by mouth Daily., Disp: 90 tablet, Rfl: 1  •  gabapentin (NEURONTIN) 400 MG capsule, Take 1 capsule by mouth 3 (Three) Times a Day., Disp: 90 capsule, Rfl: 2  •  GLUCAGON EMERGENCY 1 MG injection, USE AS DIRECTED AS NEEDED, Disp: 1 kit, Rfl: 0  •  glucose blood test strip, Use to check blood sugar 4 times daily. accucheck, Disp: 125 each, Rfl: 12  •  glucose monitor monitoring kit, 1 each Daily. accucheck eve meter, E11.9, Disp: 1 each, Rfl: 0  •  hydroCHLOROthiazide (HYDRODIURIL) 12.5 MG tablet, Take 12.5 mg by mouth Daily., Disp: , Rfl:   •  HYDROcodone-acetaminophen (Norco) 7.5-325 MG per tablet, Take 1 tablet by mouth Every 6 (Six) Hours As Needed for Moderate Pain  or Severe Pain ., Disp: 120 tablet, Rfl: 0  •  Insulin Glargine (BASAGLAR KWIKPEN) 100 UNIT/ML injection pen, Inject 100 units under skin in the the appropriate area as directed every night. , Disp: 10 pen, Rfl: 2  •  Insulin Pen Needle (B-D ULTRAFINE III SHORT PEN) 31G X 8 MM misc, USE TO INJECT 4 TIMES A DAY, Disp: 120 each, Rfl: 11  •  LORazepam (ATIVAN) 0.5 MG tablet, Take 1 tablet by mouth Daily As Needed for Anxiety., Disp: 30 tablet, Rfl: 0  •  meclizine (ANTIVERT) 25 MG tablet, Take 1 tablet by mouth 3 (Three) Times a Day As Needed for dizziness., Disp: 90 tablet, Rfl: 6  •  metoclopramide (REGLAN) 10 MG tablet, TAKE 1 TABLET BY MOUTH 4 (FOUR) TIMES A DAY AS NEEDED (GASTROPARESIS)., Disp: 120 tablet, Rfl: 1  •  metoprolol succinate XL (TOPROL-XL) 25 MG 24 hr tablet, TAKE 1 TABLET BY MOUTH EVERY DAY, Disp: 31 tablet, Rfl: 6  •  mirtazapine (REMERON) 45 MG tablet, TAKE 1 TABLET BY MOUTH EVERY NIGHT., Disp: 90 tablet, Rfl: 0  •  NOVOLOG FLEXPEN 100 UNIT/ML solution pen-injector sc pen, INJECT 60 UNITS BEFORE MEALS 3 TIMES A DAY, Disp: 162 mL, Rfl: 3  •  ondansetron (ZOFRAN) 8 MG tablet, TAKE 1 TABLET BY MOUTH EVERY 8 (EIGHT) HOURS AS NEEDED FOR NAUSEA OR VOMITING., Disp: 18 tablet, Rfl: 1  •  pantoprazole (PROTONIX) 20 MG  "EC tablet, Take 1 tablet by mouth Daily., Disp: 90 tablet, Rfl: 3  •  Syringe/Needle, Disp, (Luer Lock Safety Syringes) 25G X 5/8\" 3 ML misc, 1 each Daily. 1 Q28 DAYS, Disp: 1 each, Rfl: 2  •  venlafaxine XR (EFFEXOR-XR) 150 MG 24 hr capsule, TAKE 1 CAPSULE BY MOUTH TWICE A DAY, Disp: 90 capsule, Rfl: 1  •  vitamin D (ERGOCALCIFEROL) 1.25 MG (18039 UT) capsule capsule, TAKE ONE CAPSULE BY MOUTH EVERY SUNDAY., Disp: 12 capsule, Rfl: 2    Physical Exam:  Vitals:    08/21/20 1020   BP: 124/70   Pulse: 76   SpO2: 99%     Body mass index is 38.26 kg/m².   Last Weights:   Wt Readings from Last 3 Encounters:   08/20/20 114 kg (252 lb)   08/18/20 115 kg (254 lb)   08/10/20 115 kg (254 lb)       General: alert, appears stated age and cooperative     Body Habitus: morbidly obese    HEENT: Head: Normocephalic, no lesions, without obvious abnormality. No arcus senilis, xanthelasma or xanthomas.  No malar flush, proptosis, xanthomas or malar flush.   JVP: Volume/Pulsation: Normal.  Normal waveforms with a, x, and v waves.   Appropriate inspiratory decrease.  No Kussmaul's. No Triny's.   Normal x and y descent.    Precordium: Normal impulses. P2 is not palpable.  RV Heave: absent  LV Heave: absent  [place]:  normal size and placement  Palpable S4: absent.  Heart rate: normal    Heart Rhythm: regular     Heart Sounds: S1: normal intensity  S2: increased intensity, increased P2  S3: absent   S4: absent  Opening Snap: absent    A2-OS:  no  Pericardial Rub:  Absent: Yes      Ejection click: None      Murmurs:  absent   Extremity: moves all extremities equally.       DATA REVIEWED:                   TTE/LAMONTE:  Results for orders placed in visit on 02/26/20   Adult Transthoracic Echo Complete W/ Cont if Necessary Per Protocol    Narrative · Technically difficult study secondary to body habitus. Definity contrast   utilized.  · Left ventricular systolic function is normal at 61-65%. Mild concentric   hypertrophy with indeterminate " diastolic function.  · Right ventricular systolic function is normal. There is mild right   ventricular hypertrophy.  · Mild thickening and calcification of a likely trileaflet aortic valve.  · No evidence of pulmonary hypertension.          Lab Results   Component Value Date    GLUCOSE 389 (H) 06/10/2020    BUN 10 06/10/2020    CREATININE 0.78 06/10/2020    EGFRIFNONA 76 06/10/2020    BCR 12.8 06/10/2020    K 4.2 06/10/2020    CO2 23.7 06/10/2020    CALCIUM 9.8 06/10/2020    ALBUMIN 4.10 06/10/2020    AST 14 06/10/2020    ALT 15 06/10/2020     Lab Results   Component Value Date    WBC 9.61 06/10/2020    HGB 14.2 06/10/2020    HCT 42.6 06/10/2020    MCV 86.2 06/10/2020     06/10/2020     Lab Results   Component Value Date    CHOL 145 06/10/2020    CHLPL 211 (H) 01/19/2017    TRIG 181 (H) 06/10/2020    HDL 41 06/10/2020    LDL 68 06/10/2020     Lab Results   Component Value Date    TSH 3.280 06/10/2020    C5KSKSE 9.70 08/30/2017    THYROIDAB <6 01/02/2015     Lab Results   Component Value Date    CKTOTAL 38 02/24/2018    CKMB 1.00 02/24/2018    TROPONINI <0.012 07/22/2018    TROPONINT <0.010 02/16/2020     Lab Results   Component Value Date    HGBA1C 8.50 (H) 06/10/2020     Lab Results   Component Value Date    DDIMER 722 (H) 11/11/2016     Lab Results   Component Value Date    ALT 15 06/10/2020     Lab Results   Component Value Date    HGBA1C 8.50 (H) 06/10/2020    HGBA1C 9.12 (H) 03/09/2020    HGBA1C 9.40 (H) 02/19/2020     Lab Results   Component Value Date    MICROALBUR 3.8 06/10/2020    CREATININE 0.78 06/10/2020     Lab Results   Component Value Date    IRON 56 05/22/2020    TIBC 340 05/22/2020    FERRITIN 110.60 05/22/2020     Lab Results   Component Value Date    INR 1.02 07/21/2018    INR 0.99 04/30/2018    INR 0.95 10/14/2017    PROTIME 13.2 07/21/2018    PROTIME 12.9 04/30/2018    PROTIME 12.6 10/14/2017       Assessment/Plan     1. Pulmonary hypertension (CMS/HCC). PAH/PVH. WHO Group 2; FC: II.  RV  status: adapted.  Patient appears euvolemic. Perfusion status: good.    -No current indication for PAH-specific medications  -No compelling indication at this time for RHC  -Will continue active clinical surveillance.  Signs and symptoms of worsening PAH and RV failure were discussed.  -I have asked the patient that if they were to move to be certain they seen someone with expertise in PAH. These providers can be located at [email or web address].  -6MWT  -BNP    2. (HFpEF) heart failure with preserved ejection fraction (CMS/HCC). NYHA stage C; FC-II.  Patient appears euvolemic. Perfusion status: good. There is not evidence of decompensation.   -Continue current HFpEF medications:  -Low sodium diet  -Weigh daily; Call for concerning weight gain or concerning symptoms    3.  Tricuspid regurgitation. ACC stage B. There are no surgical indications at this time. Signs and symptoms of worsening valve disease discussed.  I've asked the patient contact me for an earlier appointment if these develop. The patient has been advised to remain in clinical surveillance. I've recommended a repeat 2D TTE every 3 years with the next TTE due: 2023.  • No indication based on 2017 ACC/AHA guidelines for IE prophylaxis for dental procedures: Optimal oral health is recommended through regular professional dental care and the use of appropriate dental products, such as manual, powered, and ultrasonic toothbrushes; dental floss; and other plaque-removal devices    4. Coronary artery disease involving native coronary artery of native heart without angina pectoris. No anginal symptoms. The patient has been revascularized.  Revascularization:  CABG and PTCI to LAD.   -ASA  -Clopidogrel  -Toprol XL  -Atorvastatin  -Call 911 immediately for concerning symptoms.    Antiplatelet and Procedures: The patient is on ASA and Clopidogrel (Plavix). If required, they may  have this agent discontinued by another physician kath-operatively until 2/2021.  Her last intervention was 2/2020. However, I understand she needs a recent surgical intervention updated. Risks and benefits discussed. It should be resumed as soon as safe from a surgical perspective. We do like our patients, if safe, to remain on ASA. The specific instructions, risks and benefits is the responsibility of the physician performing the procedure. Our current recommendations for cessation are as follows:     Clopidogrel, based on the 's insert, is recommended to be discontinued 5 days kath-operatively.    5. Cardiac Risk Assessments based on 2019 ACCF guidelines:  A team-based care approach is recommended for the control of risk factors associated with ASCAD.  As such, Ariana Martinez was requested to have ongoing follow-up with their PCP. A PCP can provide the detailed monitoring that is required for management of risk factors such as essential HTN. Essential HTN is a significant risk factor for stroke, heart disease and vascular disease. I've recommended the patient continue current medications, if any, as prescribed by the primary care provider. I recommended they have close follow-up for ongoing mgmt of this and the medical comorbidities associated with HTN with their PCP.  A diet emphasizing intake of vegetables, fruits, nuts, whole grains and fish is recommended. Physical activity recommendations were provided by literature. They were also provided with information regarding maintaining a healthy weight, heart-healthy dietary pattern and DASH information.  Goal blood pressure less than 130/80.  The patient's BMI is recommended to be calculated at least annually.  The patient's BMI is Body mass index is 38.26 kg/m²..  Tobacco status is assessed at every visit based on established guidelines.  The patient's nicotine status: has never smoked     Return in about 3 months (around 11/21/2020).

## 2020-08-21 NOTE — PATIENT INSTRUCTIONS
Dr. Kwon has recommended a Six-Minute Walk Test    What Is the Six-Minute Walk Test?    The American [employer] Society describes the six-minute walk test as a measure of functional status or fitness. It is used as a simple measure of aerobic exercise capacity. The results of this test may or may not lead your doctor to do more sophisticated measures of your heart and lung function. During this test, you walk at your normal pace for six minutes. This test can be used to monitor your response to treatments for heart, lung and other health problems. This test is commonly used for people with pulmonary hypertension, interstitial lung disease, pre-lung transplant evaluation or COPD.      What to Expect When Doing the Test      Preparing for your test:  · Wear clothes and shoes that are comfortable.  · You may use your usual walking aids such as a cane or walker, if needed.  · It is okay to eat a light meal prior to your test.  · Take your usual medications.  · Do not exercise within two hours of testing.      During the test:  · The kristina will measure your blood pressure, pulse and oxygen level usually with a pulse oximeter before you start to walk.  · You should be given the following instructions: The object of the test is to walk as far as possible for six minutes. You will walk at your normal pace to a chair or cone, and turn around. And you continue to walk back and forth for six minutes.  · Let the staff know if you are having chest pain or breathing difficulty.  · It is acceptable to slow down, rest or stop. After every minute interval, you will be given an update.      Safety:  · The kristina will watch to see if you have breathing difficulty or chest pain.  · Oxygen and other supplies will be nearby if you need them.      Understanding the Results  The results of your test are then compared to what is known to be normal for people in your weight, height, gender and age categories. They can be used to estimate  response to treatment, especially if repeated after a time interval, for instance six months or a year later. After your test, your provider may change your medication or exercise program based on your results.      What Are the Risks?  This is a low-risk medical evaluation. Medical help is easily available while the test is being done.          This content was developed in partnership with the [employer], the philanthropic arm of the [employer].  Approved by Scientific and Medical Editorial Review Panel. Last reviewed May 31, 2017.

## 2020-08-24 RX ORDER — ERGOCALCIFEROL 1.25 MG/1
CAPSULE ORAL
Qty: 12 CAPSULE | Refills: 2 | Status: SHIPPED | OUTPATIENT
Start: 2020-08-24 | End: 2021-04-20

## 2020-08-25 LAB — GABAPENTIN UR-MCNC: 116.3 UG/ML

## 2020-08-26 LAB
6-ACETYLMORPHINE: NEGATIVE
6MAM SERPLBLD-MCNC: NOT DETECTED NG/MG CREAT
7-AMINOCLONAZEPAM UR: NOT DETECTED NG/MG CREAT
A-OH ALPRAZ/CREAT UR: NOT DETECTED NG/MG CREAT
ALFENTANIL UR QL: NOT DETECTED NG/MG CREAT
ALPHA-HYDROXYMIDAZOLAM, URINE: NOT DETECTED NG/MG CREAT
ALPHA-HYDROXYTRIAZOLAM, URINE: NOT DETECTED NG/MG CREAT
AMOBARBITAL UR: NOT DETECTED
AMPHET UR QL CFM: NOT DETECTED NG/MG CREAT
AMPHETAMINES UR QL SCN: NEGATIVE
BARBITAL UR QL CFM: NOT DETECTED
BARBITURATES UR QL SCN: NEGATIVE
BENZODIAZ UR QL SCN: NORMAL
BENZOYLECGONINE UR: NOT DETECTED NG/MG CREAT
BUPRENORPHINE UR QL: NEGATIVE
BUPRENORPHINE UR QL: NOT DETECTED NG/MG CREAT
BUTABARBITAL UR QL: NOT DETECTED
BUTALBITAL UR QL: NOT DETECTED
CANNABINOIDS UR QL CFM: NEGATIVE
CLONAZEPAM UR QL: NOT DETECTED NG/MG CREAT
COCAETHYLENE UR QL CFM: NOT DETECTED NG/MG CREAT
COCAINE UR QL CFM: NEGATIVE
CODEINE UR QL: NOT DETECTED NG/MG CREAT
CONV COCAINE, UR: NOT DETECTED NG/MG CREAT
CONV REPORT SUMMARY: NORMAL
CREAT 24H UR-MCNC: 59 MG/DL
DESALKYLFLURAZ/CREAT UR: NOT DETECTED NG/MG CREAT
DESMETHYLFLUNITRAZEPAM: NOT DETECTED NG/MG CREAT
DIAZEPAM UR-MCNC: NOT DETECTED NG/MG CREAT
DIHYDROCODEINE UR: 281 NG/MG CREAT
EDDP SERPL QL: NOT DETECTED NG/MG CREAT
ETHANOL SCREEN URINE (REF): NORMAL
ETHANOL UR-MCNC: 0.18 G/DL
FENTANYL UR QL: NEGATIVE
FENTANYL+NORFENTANYL UR QL SCN: NOT DETECTED NG/MG CREAT
FLUNITRAZEPAM SERPLBLD-MCNC: NOT DETECTED NG/MG CREAT
HYDROCODONE UR QL: 1146 NG/MG CREAT
HYDROMORPHONE UR QL: 166 NG/MG CREAT
LEVEL OF DETECTION:: NORMAL
LORAZEPAM UR-MCNC: 241 NG/MG CREAT
LORAZEPAM/CREAT UR: NOT DETECTED NG/MG CREAT
MDA SERPLBLD-MCNC: NOT DETECTED NG/MG CREAT
MDMA UR QL SCN: NOT DETECTED NG/MG CREAT
MEPHOBARBITAL UR QL CFM: NOT DETECTED
METHADONE BLD QL SCN: NEGATIVE
METHADONE, URINE: NOT DETECTED NG/MG CREAT
METHAMPHETAMINE UR: NOT DETECTED NG/MG CREAT
MIDAZOLAM UR-MCNC: NOT DETECTED NG/MG CREAT
MORPHINE UR QL: NOT DETECTED NG/MG CREAT
N-DESMETHYLTRAMADOL, U: NOT DETECTED NG/MG CREAT
NARCOTICS UR: NEGATIVE
NORBUPRENORPHINE SERPLBLD-MCNC: NOT DETECTED NG/MG CREAT
NORCODEINE UR-MCNC: NOT DETECTED NG/MG CREAT
NORDIAZEPAM SERPL CFM-MCNC: NOT DETECTED NG/MG CREAT
NORFENTANYL UR: NOT DETECTED NG/MG CREAT
NORMORPHINE: NOT DETECTED NG/MG CREAT
NOROXYMORPHONE: NOT DETECTED NG/MG CREAT
O-DESMETHYLTRAMADOL, UR: NOT DETECTED NG/MG CREAT
OPIATES UR QL CFM: NORMAL
OPIATES, URINE, NORHYDROCODONE: 1342 NG/MG CREAT
OPIATES, URINE, NOROXYCODONE: NOT DETECTED NG/MG CREAT
OXAZEPAM UR QL: NOT DETECTED NG/MG CREAT
OXYCODONE UR QL: NEGATIVE
OXYCODONE UR: NOT DETECTED NG/MG CREAT
OXYMORPHONE UR: NOT DETECTED NG/MG CREAT
PENTOBARBITAL UR: NOT DETECTED
PHENOBARB UR QL: NOT DETECTED
SECOBARBITAL UR QL: NOT DETECTED
SUFENTANIL UR QL: NOT DETECTED NG/MG CREAT
TAPENTADOL SERPLBLD-MCNC: NEGATIVE NG/ML
TAPENTADOL UR-MCNC: NOT DETECTED NG/MG CREAT
TEMAZEPAM UR QL CFM: NOT DETECTED NG/MG CREAT
THC UR CFM-MCNC: NOT DETECTED NG/MG CREAT
THIOPENTAL UR QL CFM: NOT DETECTED
TRAMADOL UR: NOT DETECTED NG/MG CREAT

## 2020-08-27 ENCOUNTER — LAB (OUTPATIENT)
Dept: ONCOLOGY | Facility: HOSPITAL | Age: 60
End: 2020-08-27

## 2020-08-27 ENCOUNTER — OFFICE VISIT (OUTPATIENT)
Dept: ONCOLOGY | Facility: CLINIC | Age: 60
End: 2020-08-27

## 2020-08-27 VITALS
WEIGHT: 242.1 LBS | SYSTOLIC BLOOD PRESSURE: 148 MMHG | DIASTOLIC BLOOD PRESSURE: 68 MMHG | HEART RATE: 81 BPM | HEIGHT: 68 IN | BODY MASS INDEX: 36.69 KG/M2 | RESPIRATION RATE: 18 BRPM | TEMPERATURE: 97.1 F

## 2020-08-27 DIAGNOSIS — D72.828 OTHER ELEVATED WHITE BLOOD CELL (WBC) COUNT: ICD-10-CM

## 2020-08-27 DIAGNOSIS — D50.0 IRON DEFICIENCY ANEMIA DUE TO CHRONIC BLOOD LOSS: ICD-10-CM

## 2020-08-27 DIAGNOSIS — E61.1 IRON DEFICIENCY: ICD-10-CM

## 2020-08-27 LAB
BASOPHILS # BLD AUTO: 0.05 10*3/MM3 (ref 0–0.2)
BASOPHILS NFR BLD AUTO: 0.4 % (ref 0–1.5)
DEPRECATED RDW RBC AUTO: 41.7 FL (ref 37–54)
EOSINOPHIL # BLD AUTO: 0.25 10*3/MM3 (ref 0–0.4)
EOSINOPHIL NFR BLD AUTO: 2.1 % (ref 0.3–6.2)
ERYTHROCYTE [DISTWIDTH] IN BLOOD BY AUTOMATED COUNT: 13.2 % (ref 12.3–15.4)
FERRITIN SERPL-MCNC: 561.6 NG/ML (ref 13–150)
HCT VFR BLD AUTO: 40 % (ref 34–46.6)
HGB BLD-MCNC: 13.5 G/DL (ref 12–15.9)
IMM GRANULOCYTES # BLD AUTO: 0.04 10*3/MM3 (ref 0–0.05)
IMM GRANULOCYTES NFR BLD AUTO: 0.3 % (ref 0–0.5)
IRON 24H UR-MRATE: 45 MCG/DL (ref 37–145)
IRON SATN MFR SERPL: 18 % (ref 20–50)
LYMPHOCYTES # BLD AUTO: 1.79 10*3/MM3 (ref 0.7–3.1)
LYMPHOCYTES NFR BLD AUTO: 15 % (ref 19.6–45.3)
MCH RBC QN AUTO: 29.5 PG (ref 26.6–33)
MCHC RBC AUTO-ENTMCNC: 33.8 G/DL (ref 31.5–35.7)
MCV RBC AUTO: 87.5 FL (ref 79–97)
MONOCYTES # BLD AUTO: 0.84 10*3/MM3 (ref 0.1–0.9)
MONOCYTES NFR BLD AUTO: 7 % (ref 5–12)
NEUTROPHILS NFR BLD AUTO: 75.2 % (ref 42.7–76)
NEUTROPHILS NFR BLD AUTO: 9 10*3/MM3 (ref 1.7–7)
NRBC BLD AUTO-RTO: 0 /100 WBC (ref 0–0.2)
PLATELET # BLD AUTO: 453 10*3/MM3 (ref 140–450)
PMV BLD AUTO: 9.1 FL (ref 6–12)
RBC # BLD AUTO: 4.57 10*6/MM3 (ref 3.77–5.28)
TIBC SERPL-MCNC: 246 MCG/DL (ref 298–536)
TRANSFERRIN SERPL-MCNC: 165 MG/DL (ref 200–360)
WBC # BLD AUTO: 11.97 10*3/MM3 (ref 3.4–10.8)

## 2020-08-27 PROCEDURE — G0463 HOSPITAL OUTPT CLINIC VISIT: HCPCS | Performed by: NURSE PRACTITIONER

## 2020-08-27 PROCEDURE — 82728 ASSAY OF FERRITIN: CPT | Performed by: NURSE PRACTITIONER

## 2020-08-27 PROCEDURE — 83540 ASSAY OF IRON: CPT | Performed by: NURSE PRACTITIONER

## 2020-08-27 PROCEDURE — 99213 OFFICE O/P EST LOW 20 MIN: CPT | Performed by: NURSE PRACTITIONER

## 2020-08-27 PROCEDURE — 84466 ASSAY OF TRANSFERRIN: CPT | Performed by: NURSE PRACTITIONER

## 2020-08-27 PROCEDURE — 85025 COMPLETE CBC W/AUTO DIFF WBC: CPT | Performed by: NURSE PRACTITIONER

## 2020-08-27 NOTE — PROGRESS NOTES
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  Due to iron deficiency and inability to tolerate iron by mouth, patient received 2 dose of intravenous Feraheme on October 23, 2019 and October 30, 2019.    Recently seen in May and iron stores were dropping again and patient was given 2 additional doses of IV iron on 6/4/2020 and 6/12/2020. She also had upper and lower endoscopies 6/24/2020 with no identifiable source of bleeding.    PAST MEDICAL HISTORY:    Past Medical History:   Diagnosis Date   • Angina, class IV (CMS/HCC)    • Anxiety    • Anxiety and depression    • Benign paroxysmal positional vertigo    • Bladder disorder     has bladder stimulator   • Carpal tunnel syndrome    • Chronic pain    • Coronary atherosclerosis     hx CABG 2005.  is followed by Dr Kwon   • Depression    • Diabetes mellitus (CMS/HCC)     Type 2, controlled   • Diabetic polyneuropathy (CMS/HCC)    • Elevated cholesterol    • Female stress incontinence    • Foot pain, left    • Full dentures    • Gastroparesis    • GERD (gastroesophageal reflux disease)    • Hyperlipidemia    • Hypertension    • Low back pain    • Malaise and fatigue    • Multiple joint pain    • Obesity     Refuses to be weighed   • Otalgia     Both   • Perforation of tympanic membrane     Left   • Postoperative wound infection    • Vitamin D deficiency    • Wears glasses     reading       SOCIAL HISTORY:    Social History     Tobacco Use   • Smoking status: Never Smoker   • Smokeless tobacco: Never Used   Substance Use Topics   • Alcohol  use: No   • Drug use: No       Surgical History :  Past Surgical History:   Procedure Laterality Date   • ABDOMINAL SURGERY     • ANGIOPLASTY      coronary   • BREAST BIOPSY Right    • CARDIAC CATHETERIZATION     • CARDIAC CATHETERIZATION N/A 6/20/2017    Procedure: Right Heart Cath;  Surgeon: Can Kwon MD PhD;  Location: [facility] CATH INVASIVE LOCATION;  Service:    • CARDIAC CATHETERIZATION N/A 2/18/2020    Procedure: Left Heart Cath;  Surgeon: Catalina Cooper MD;  Location: [facility] CATH INVASIVE LOCATION;  Service: Cardiology;  Laterality: N/A;   • CARPAL TUNNEL RELEASE     • CHOLECYSTECTOMY     • COLONOSCOPY N/A 6/24/2020    Procedure: COLONOSCOPY;  Surgeon: Julián Maldonado MD;  Location: [facility] ENDOSCOPY;  Service: Gastroenterology;  Laterality: N/A;   • CORONARY ARTERY BYPASS GRAFT  2005   • ENDOSCOPY N/A 10/19/2018    Procedure: ESOPHAGOGASTRODUODENOSCOPY possible dilation;  Surgeon: Julián Maldonado MD;  Location: [facility] ENDOSCOPY;  Service: Gastroenterology   • ENDOSCOPY N/A 6/24/2020    Procedure: ESOPHAGOGASTRODUODENOSCOPY WED appt please;  Surgeon: Julián Maldonado MD;  Location: [facility] ENDOSCOPY;  Service: Gastroenterology;  Laterality: N/A;   • ENDOSCOPY AND COLONOSCOPY     • FOOT SURGERY      Toes   • FOOT SURGERY     • GASTRIC BANDING      Revision, laparoscopic   • HYSTERECTOMY     • MOUTH SURGERY     • SALPINGO OOPHORECTOMY     • SHOULDER SURGERY     • SUBTALAR ARTHRODESIS Left 1/16/2019    Procedure: LEFT FOOT HARDWARE REMOVAL, FIFTH METATARSAL , OPEN REDUCTION INTERNAL FIXATION, CALCANEAL OSTEOTOMY;  Surgeon: Ignacio Lord DPM;  Location: [facility] OR;  Service: Podiatry   • SUBTALAR ARTHRODESIS Left 10/16/2019    Procedure: foot hardware removal, subtalar joint fusion  possible de/reattachment of achilles tendon        (c-arm);  Surgeon: Ignacio Lord DPM;  Location: [facility] OR;  Service: Podiatry   • TRANSESOPHAGEAL ECHOCARDIOGRAM (LAMONTE)      With color flow  "      ALLERGIES:    Allergies   Allergen Reactions   • Seroquel [Quetiapine] Anaphylaxis   • Avandia [Rosiglitazone] Swelling   • Morphine And Related Hallucinations   • Oxycodone Hallucinations       REVIEW OF SYSTEMS:      CONSTITUTIONAL:  No fever, chills, or night sweats.     HEENT:  No epistaxis, mouth sores, or difficulty swallowing.    RESPIRATORY:  No new shortness of breath or cough at present.    CARDIOVASCULAR:  No chest pain or palpitations.    GASTROINTESTINAL:  No abdominal pain, nausea, vomiting, or blood in the stool.    GENITOURINARY:  No dysuria or hematuria.    MUSCULOSKELETAL:  No any new back pain or arthralgias.     NEUROLOGICAL:  No tingling or numbness. No new headache or dizziness.     LYMPHATICS:  Denies any abnormal swollen and anywhere in the body.    SKIN:  Denies any new skin rash.    PHYSICAL EXAMINATION:      VITAL SIGNS:  /68   Pulse 81   Temp 97.1 °F (36.2 °C) (Temporal)   Resp 18   Ht 172.7 cm (67.99\")   Wt 110 kg (242 lb 1.6 oz)   BMI 36.82 kg/m²     GENERAL:  Not in any distress.    HEENT:  Normocephalic, Atraumatic.Mild Conjunctival pallor. No icterus. Extraocular Movements Intact. No Facial Asymmetry noted.    NECK:  No adenopathy. No JVD.    RESPIRATORY:  Fair air entry bilateral. No rhonchi or wheezing.    CARDIOVASCULAR:  S1, S2. Regular rate and rhythm. No murmur or gallop appreciated.    ABDOMEN:  Soft, obese, nontender. Bowel sounds present in all four quadrants.  No organomegaly appreciated.    EXTREMITIES:  No edema.No Calf Tenderness.    NEUROLOGIC:  Alert, awake and oriented ×3.  No  Motor or sensory deficit appreciated. Cranial Nerves 2-12 grossly intact.    SKIN : No new skin lesion identified  DIAGNOSTIC DATA:    Glucose   Date Value Ref Range Status   06/10/2020 389 (H) 65 - 99 mg/dL Final     Glucose, Arterial   Date Value Ref Range Status   10/14/2017 155 mmol/L Final     Sodium   Date Value Ref Range Status   06/10/2020 136 136 - 145 mmol/L Final "     Potassium   Date Value Ref Range Status   06/10/2020 4.2 3.5 - 5.2 mmol/L Final     CO2   Date Value Ref Range Status   06/10/2020 23.7 22.0 - 29.0 mmol/L Final     Chloride   Date Value Ref Range Status   06/10/2020 97 (L) 98 - 107 mmol/L Final     Anion Gap   Date Value Ref Range Status   06/10/2020 15.3 (H) 5.0 - 15.0 mmol/L Final     Creatinine   Date Value Ref Range Status   06/10/2020 0.78 0.57 - 1.00 mg/dL Final     BUN   Date Value Ref Range Status   06/10/2020 10 6 - 20 mg/dL Final     BUN/Creatinine Ratio   Date Value Ref Range Status   06/10/2020 12.8 7.0 - 25.0 Final     Calcium   Date Value Ref Range Status   06/10/2020 9.8 8.6 - 10.5 mg/dL Final     eGFR Non  Amer   Date Value Ref Range Status   06/10/2020 76 >60 mL/min/1.73 Final     Alkaline Phosphatase   Date Value Ref Range Status   06/10/2020 144 (H) 39 - 117 U/L Final     Total Protein   Date Value Ref Range Status   06/10/2020 6.9 6.0 - 8.5 g/dL Final     ALT (SGPT)   Date Value Ref Range Status   06/10/2020 15 1 - 33 U/L Final     AST (SGOT)   Date Value Ref Range Status   06/10/2020 14 1 - 32 U/L Final     Total Bilirubin   Date Value Ref Range Status   06/10/2020 0.6 0.2 - 1.2 mg/dL Final     Albumin   Date Value Ref Range Status   06/10/2020 4.10 3.50 - 5.20 g/dL Final     Globulin   Date Value Ref Range Status   06/10/2020 2.8 gm/dL Final     Lab Results   Component Value Date    WBC 11.97 (H) 08/27/2020    HGB 13.5 08/27/2020    HCT 40.0 08/27/2020    MCV 87.5 08/27/2020     (H) 08/27/2020     Lab Results   Component Value Date    NEUTROABS 9.00 (H) 08/27/2020    IRON 45 08/27/2020    TIBC 246 (L) 08/27/2020    LABIRON 18 (L) 08/27/2020    FERRITIN 110.60 05/22/2020    [id number] 710 06/10/2020    FOLATE 11.90 05/22/2020     Lab Results   Component Value Date    REFLABREPO SEE ATTACHED REPORT 09/30/2019    REFLABREPO SEE ATTACHED REPORT 09/30/2019   ]  Tony 2 mutation including exon 12, CALR, MPL mutation was negative on  peripheral blood on September 30, 2019.              ASSESSMENT AND PLAN:       1.  Thrombocytosis:  - Patient has intermittent thrombocytosis since 2015.  -Platelet count done today 8/27/2020 is normal at 453,000.  - Extensive work-up including Tony 2 mutation with exon 12, CALR mutation, MPL mutation done on September 30, 2019 is negative.  - At this point will continue with clinical monitoring.  Will ask patient to return to clinic in 3 months with repeat CBC and anemia work-up to be done     2.  Leukocytosis:  - Patient has persistent leukocytosis since 2015.  -White blood cell count was elevated at 11,000.  - CANDICE R mutation, MPL mutation, Tony 2 mutation including exon 12 were negative.  At this point will continue with clinical monitoring.  - If patient has any worsening leukocytosis or thrombocytosis in future, she will need bone marrow biopsy which was discussed with patient.     3.  Iron deficiency:  -Patient is found to have iron deficiency with iron saturation of only 11% and ferritin of 46.  Patient states in the past she is not able to tolerate iron by mouth.  -Patient has received multiple doses of IV iron; most recently in June 2020 with dropping iron stores.  -Hgb today is 13.5 with iron saturation of 18%.  -Patient remains on monthly B12 injection by primary medical provider.  -We will ask patient to return to clinic in 3 months with repeat CBC and anemia work-up to be done on that day. No recent folate level has been checked will recheck B-12 and folate on next visit as well.         This document has been signed by BEBE Suero on August 27, 2020 13:12

## 2020-08-28 ENCOUNTER — TELEPHONE (OUTPATIENT)
Dept: ONCOLOGY | Facility: HOSPITAL | Age: 60
End: 2020-08-28

## 2020-08-28 NOTE — TELEPHONE ENCOUNTER
----- Message from BEBE Suero sent at 8/28/2020  9:14 AM CDT -----  Let her know no need for IV iron

## 2020-08-29 DIAGNOSIS — I10 ESSENTIAL HYPERTENSION: Chronic | ICD-10-CM

## 2020-08-31 RX ORDER — FUROSEMIDE 40 MG/1
TABLET ORAL
Qty: 90 TABLET | Refills: 1 | Status: SHIPPED | OUTPATIENT
Start: 2020-08-31 | End: 2021-04-05

## 2020-09-03 ENCOUNTER — TELEPHONE (OUTPATIENT)
Dept: FAMILY MEDICINE CLINIC | Facility: CLINIC | Age: 60
End: 2020-09-03

## 2020-09-03 DIAGNOSIS — F41.1 GENERALIZED ANXIETY DISORDER: ICD-10-CM

## 2020-09-03 DIAGNOSIS — G89.29 CHRONIC RIGHT-SIDED LOW BACK PAIN WITH RIGHT-SIDED SCIATICA: Chronic | ICD-10-CM

## 2020-09-03 DIAGNOSIS — M54.41 CHRONIC RIGHT-SIDED LOW BACK PAIN WITH RIGHT-SIDED SCIATICA: Chronic | ICD-10-CM

## 2020-09-03 RX ORDER — LORAZEPAM 0.5 MG/1
0.5 TABLET ORAL DAILY PRN
Qty: 30 TABLET | Refills: 0 | Status: SHIPPED | OUTPATIENT
Start: 2020-09-03 | End: 2020-10-09 | Stop reason: SDUPTHER

## 2020-09-03 RX ORDER — HYDROCODONE BITARTRATE AND ACETAMINOPHEN 7.5; 325 MG/1; MG/1
1 TABLET ORAL EVERY 6 HOURS PRN
Qty: 120 TABLET | Refills: 0 | Status: SHIPPED | OUTPATIENT
Start: 2020-09-03 | End: 2020-10-03 | Stop reason: HOSPADM

## 2020-09-03 NOTE — TELEPHONE ENCOUNTER
Patient has chronic anxiety requiring benzodiazepine use concurrently with chronic pain requiring opiate pain medication and/ or gabapentin. Patient has been educated regarding potential dangers of oversedation and accidental overdose with use of both medications concurrently. Serial assessments of patient has yielded no sign of oversedation or adverse effects of patient, and he/she is advised and aware to notify my office immediately if symptoms do occur, as well as to discontinue use of benzodiazepine medication and opiate medication immediately if that occurs, pending medical evaluation and advice. Patient has been compliant with use of medications, UDS, visits with no adverse effects noted. Patient understands the risks associated with this controlled medication, including tolerance and addiction.  Patient also agrees to only obtain this medication from me, and not from a another provider, unless that provider is covering for me in my absence.  Patient also agrees to be compliant in dosing, and not self adjust the dose of medication.  A signed controlled substance agreement is on file, and the patient has received a controlled substance education sheet at this a previous visit.  The patient has also signed a consent for treatment with a controlled substance as per [facility] policy. LESTER was obtained. Refills were sent for:  Hydrocodone and lorazepam.     This document has been electronically signed by BEBE Palomino on September 3, 2020 18:38

## 2020-09-09 ENCOUNTER — OFFICE VISIT (OUTPATIENT)
Dept: ENDOCRINOLOGY | Facility: CLINIC | Age: 60
End: 2020-09-09

## 2020-09-09 VITALS
SYSTOLIC BLOOD PRESSURE: 134 MMHG | WEIGHT: 249.1 LBS | BODY MASS INDEX: 37.75 KG/M2 | OXYGEN SATURATION: 99 % | HEART RATE: 74 BPM | DIASTOLIC BLOOD PRESSURE: 52 MMHG | HEIGHT: 68 IN

## 2020-09-09 DIAGNOSIS — E55.9 VITAMIN D DEFICIENCY: ICD-10-CM

## 2020-09-09 DIAGNOSIS — E78.2 MIXED HYPERLIPIDEMIA: Primary | ICD-10-CM

## 2020-09-09 DIAGNOSIS — I10 ESSENTIAL HYPERTENSION: ICD-10-CM

## 2020-09-09 DIAGNOSIS — IMO0002 UNCONTROLLED TYPE 2 DIABETES MELLITUS WITH NEUROLOGIC COMPLICATION, WITH LONG-TERM CURRENT USE OF INSULIN: ICD-10-CM

## 2020-09-09 PROCEDURE — 99214 OFFICE O/P EST MOD 30 MIN: CPT | Performed by: NURSE PRACTITIONER

## 2020-09-09 RX ORDER — MECLIZINE HYDROCHLORIDE 25 MG/1
25 TABLET ORAL 3 TIMES DAILY PRN
Qty: 60 TABLET | Refills: 0 | Status: SHIPPED | OUTPATIENT
Start: 2020-09-09 | End: 2020-09-23

## 2020-09-09 NOTE — PROGRESS NOTES
Subjective    Ariana Martinez is a 60 y.o. female. she is here today for follow-up.    History of Present Illness     IN OFFICE VISIT       Reason -diabetes         Duration/Timing: Diabetes mellitus type 2, Age at onset of diabetes: 26 years years, Onset of symptoms gradual  timing - constant     qualtiy - uncontrolled     severity - moderate     patient broke left foot   -----------------------     Severity (Complications/Hospitalizations)  Secondary Macrovascular Complications: CAD, No CVA, No PAD     2 stents placed in feb. 2020         Secondary Microvascular Complications: No Diabetic Nephropathy, No Diabetic Retinopathy, Diabetic Neuropathy     Context  Diabetes Regimen: Insulin, Oral Medications                    Lab Results   Component Value Date     HGBA1C 8.50 (H) 06/10/2020               Blood Glucose Readings     Off  dexcom sensor --- due to finances       RUNNING HIGHER                     Diet  variable carb intake  Exercise: Exercises  walking     Associated Signs/Symptoms  Hyperglycemic Symptoms: No polyuria, No polydipsia, No polyphagia, No weight gain  Hypoglycemic Episodes: Documented symptomatic hypoglycemia, Seizures and syncope related to hypoglycemia                  The following portions of the patient's history were reviewed and updated as appropriate:   Past Medical History:   Diagnosis Date   • Angina, class IV (CMS/HCC)    • Anxiety    • Anxiety and depression    • Benign paroxysmal positional vertigo    • Bladder disorder     has bladder stimulator   • Carpal tunnel syndrome    • Chronic pain    • Coronary atherosclerosis     hx CABG 2005.  is followed by Dr Kwon   • Depression    • Diabetes mellitus (CMS/HCC)     Type 2, controlled   • Diabetic polyneuropathy (CMS/HCC)    • Elevated cholesterol    • Female stress incontinence    • Foot pain, left    • Full dentures    • Gastroparesis    • GERD (gastroesophageal reflux disease)    • Hyperlipidemia    • Hypertension    • Low  back pain    • Malaise and fatigue    • Multiple joint pain    • Obesity     Refuses to be weighed   • Otalgia     Both   • Perforation of tympanic membrane     Left   • Postoperative wound infection    • Vitamin D deficiency    • Wears glasses     reading     Past Surgical History:   Procedure Laterality Date   • ABDOMINAL SURGERY     • ANGIOPLASTY      coronary   • BREAST BIOPSY Right    • CARDIAC CATHETERIZATION     • CARDIAC CATHETERIZATION N/A 6/20/2017    Procedure: Right Heart Cath;  Surgeon: Can Kwon MD PhD;  Location: [facility] CATH INVASIVE LOCATION;  Service:    • CARDIAC CATHETERIZATION N/A 2/18/2020    Procedure: Left Heart Cath;  Surgeon: Catalina Cooper MD;  Location: [facility] CATH INVASIVE LOCATION;  Service: Cardiology;  Laterality: N/A;   • CARPAL TUNNEL RELEASE     • CHOLECYSTECTOMY     • COLONOSCOPY N/A 6/24/2020    Procedure: COLONOSCOPY;  Surgeon: Julián Maldonado MD;  Location: [facility] ENDOSCOPY;  Service: Gastroenterology;  Laterality: N/A;   • CORONARY ARTERY BYPASS GRAFT  2005   • ENDOSCOPY N/A 10/19/2018    Procedure: ESOPHAGOGASTRODUODENOSCOPY possible dilation;  Surgeon: Julián Maldonado MD;  Location: [facility] ENDOSCOPY;  Service: Gastroenterology   • ENDOSCOPY N/A 6/24/2020    Procedure: ESOPHAGOGASTRODUODENOSCOPY WED appt please;  Surgeon: Julián Maldonado MD;  Location: [facility] ENDOSCOPY;  Service: Gastroenterology;  Laterality: N/A;   • ENDOSCOPY AND COLONOSCOPY     • FOOT SURGERY      Toes   • FOOT SURGERY     • GASTRIC BANDING      Revision, laparoscopic   • HYSTERECTOMY     • MOUTH SURGERY     • SALPINGO OOPHORECTOMY     • SHOULDER SURGERY     • SUBTALAR ARTHRODESIS Left 1/16/2019    Procedure: LEFT FOOT HARDWARE REMOVAL, FIFTH METATARSAL , OPEN REDUCTION INTERNAL FIXATION, CALCANEAL OSTEOTOMY;  Surgeon: Ignacio Lord DPM;  Location: [facility] OR;  Service: Podiatry   • SUBTALAR ARTHRODESIS Left 10/16/2019    Procedure: foot hardware removal, subtalar joint fusion   possible de/reattachment of achilles tendon        (c-arm);  Surgeon: Ignacio Lord DPM;  Location: [facility] OR;  Service: Podiatry   • TRANSESOPHAGEAL ECHOCARDIOGRAM ([place])      With color flow     Family History   Problem Relation Age of Onset   • Diabetes Other    • Heart disease Other    • Hypertension Other    • Heart disease Mother    • Stroke Mother    • Hypertension Mother    • Diabetes Sister    • Heart disease Sister    • Hypertension Sister    • Heart disease Sister    • Diabetes Sister    • Hypertension Sister    • Diabetes Sister    • Diabetes Sister    • Diabetes Sister    • Diabetes Sister      OB History        0    Para   0    Term   0       0    AB   0    Living   0       SAB   0    TAB   0    Ectopic   0    Molar        Multiple   0    Live Births                  Current Outpatient Medications   Medication Sig Dispense Refill   • ARIPiprazole (ABILIFY) 15 MG tablet Take 1 tablet by mouth Daily. 90 tablet 1   • aspirin 81 MG chewable tablet Chew 81 mg daily.     • atorvastatin (LIPITOR) 80 MG tablet TAKE 1 TABLET BY MOUTH EVERY DAY 90 tablet 1   • BD SHARPS CONTAINER HOME misc 1 each Take As Directed. 1 each 0   • Blood Glucose Monitoring Suppl (ONE TOUCH ULTRA MINI) w/Device kit USE AS DIRECTED TO CHECK BLOOD SUGAR 1 each 0   • Calcium Citrate-Vitamin D (CITRACAL/VITAMIN D) 250-200 MG-UNIT tablet Take 2 tablets by mouth 2 (two) times a day.     • clopidogrel (PLAVIX) 75 MG tablet TAKE 1 TABLET BY MOUTH EVERY DAY 90 tablet 1   • cyanocobalamin 1000 MCG/ML injection INJECT 1 ML INTO THE APPROPRIATE MUSCLE AS DIRECTED BY PRESCRIBER EVERY 28 (TWENTY-EIGHT) DAYS. 3 mL 0   • furosemide (LASIX) 40 MG tablet TAKE 1 TABLET BY MOUTH EVERY DAY 90 tablet 1   • gabapentin (NEURONTIN) 400 MG capsule Take 1 capsule by mouth 3 (Three) Times a Day. 90 capsule 2   • GLUCAGON EMERGENCY 1 MG injection USE AS DIRECTED AS NEEDED 1 kit 0   • glucose blood test strip Use to check blood sugar 4 times  "daily. accucheck 125 each 12   • glucose monitor monitoring kit 1 each Daily. accucheck eve meter, E11.9 1 each 0   • hydroCHLOROthiazide (HYDRODIURIL) 12.5 MG tablet Take 12.5 mg by mouth Daily.     • HYDROcodone-acetaminophen (Norco) 7.5-325 MG per tablet Take 1 tablet by mouth Every 6 (Six) Hours As Needed for Moderate Pain  or Severe Pain . 120 tablet 0   • Insulin Glargine (BASAGLAR KWIKPEN) 100 UNIT/ML injection pen Inject 100 units under skin in the the appropriate area as directed every night.  10 pen 2   • Insulin Pen Needle (B-D ULTRAFINE III SHORT PEN) 31G X 8 MM misc USE TO INJECT 4 TIMES A  each 11   • LORazepam (ATIVAN) 0.5 MG tablet Take 1 tablet by mouth Daily As Needed for Anxiety. 30 tablet 0   • meclizine (ANTIVERT) 25 MG tablet Take 1 tablet by mouth 3 (Three) Times a Day As Needed for dizziness. 90 tablet 6   • metoclopramide (REGLAN) 10 MG tablet TAKE 1 TABLET BY MOUTH 4 (FOUR) TIMES A DAY AS NEEDED (GASTROPARESIS). 120 tablet 1   • metoprolol succinate XL (TOPROL-XL) 25 MG 24 hr tablet TAKE 1 TABLET BY MOUTH EVERY DAY 31 tablet 6   • mirtazapine (REMERON) 45 MG tablet TAKE 1 TABLET BY MOUTH EVERY NIGHT. 90 tablet 0   • NOVOLOG FLEXPEN 100 UNIT/ML solution pen-injector sc pen INJECT 60 UNITS BEFORE MEALS 3 TIMES A  mL 3   • ondansetron (ZOFRAN) 8 MG tablet TAKE 1 TABLET BY MOUTH EVERY 8 (EIGHT) HOURS AS NEEDED FOR NAUSEA OR VOMITING. 18 tablet 1   • pantoprazole (PROTONIX) 20 MG EC tablet Take 1 tablet by mouth Daily. 90 tablet 3   • Syringe/Needle, Disp, (Luer Lock Safety Syringes) 25G X 5/8\" 3 ML misc 1 each Daily. 1 Q28 DAYS 1 each 2   • venlafaxine XR (EFFEXOR-XR) 150 MG 24 hr capsule TAKE 1 CAPSULE BY MOUTH TWICE A DAY 90 capsule 1   • vitamin D (ERGOCALCIFEROL) 1.25 MG (30639 UT) capsule capsule TAKE ONE CAPSULE BY MOUTH EVERY SUNDAY. 12 capsule 2   • meclizine (ANTIVERT) 25 MG tablet Take 1 tablet by mouth 3 (Three) Times a Day As Needed for Dizziness. 60 tablet 0     No " current facility-administered medications for this visit.      Allergies   Allergen Reactions   • Seroquel [Quetiapine] Anaphylaxis   • Avandia [Rosiglitazone] Swelling   • Morphine And Related Hallucinations   • Oxycodone Hallucinations     Social History     Socioeconomic History   • Marital status:      Spouse name: Not on file   • Number of children: Not on file   • Years of education: Not on file   • Highest education level: Not on file   Tobacco Use   • Smoking status: Never Smoker   • Smokeless tobacco: Never Used   Substance and Sexual Activity   • Alcohol use: No   • Drug use: No   • Sexual activity: Defer       Review of Systems  Review of Systems   Constitutional: Negative for activity change, appetite change, diaphoresis and fatigue.   HENT: Negative for facial swelling, sneezing, sore throat, tinnitus, trouble swallowing and voice change.    Eyes: Negative for photophobia, pain, discharge, redness, itching and visual disturbance.   Respiratory: Negative for apnea, cough, choking, chest tightness and shortness of breath.    Cardiovascular: Negative for chest pain, palpitations and leg swelling.   Gastrointestinal: Negative for abdominal distention, abdominal pain, constipation, diarrhea, nausea and vomiting.   Endocrine: Negative for cold intolerance, heat intolerance, polydipsia, polyphagia and polyuria.   Genitourinary: Negative for difficulty urinating, dysuria, frequency, hematuria and urgency.   Musculoskeletal: Negative for arthralgias, back pain, gait problem, joint swelling, myalgias, neck pain and neck stiffness.   Skin: Negative for color change, pallor, rash and wound.   Neurological: Negative for dizziness, tremors, weakness, light-headedness, numbness and headaches.   Hematological: Negative for adenopathy. Does not bruise/bleed easily.   Psychiatric/Behavioral: Negative for behavioral problems, confusion and sleep disturbance.        Objective    /52   Pulse 74   Ht 172.7 cm  "(68\")   Wt 113 kg (249 lb 1.6 oz) Comment: with boot on left foot  SpO2 99%   BMI 37.88 kg/m²   Physical Exam   Constitutional: She is oriented to person, place, and time. She appears well-developed and well-nourished. No distress.   HENT:   Head: Normocephalic and atraumatic.   Right Ear: External ear normal.   Left Ear: External ear normal.   Nose: Nose normal.   Eyes: Pupils are equal, round, and reactive to light. Conjunctivae and EOM are normal.   Neck: Normal range of motion. Neck supple. No tracheal deviation present. No thyromegaly present.   Cardiovascular: Normal rate, regular rhythm and normal heart sounds.   No murmur heard.  Pulmonary/Chest: Effort normal and breath sounds normal. No respiratory distress. She has no wheezes.   Abdominal: Soft. Bowel sounds are normal. There is no tenderness. There is no rebound and no guarding.   Musculoskeletal: Normal range of motion. She exhibits no edema, tenderness or deformity.   Boot on the left    Neurological: She is alert and oriented to person, place, and time. No cranial nerve deficit.   Skin: Skin is warm and dry. No rash noted.   Psychiatric: She has a normal mood and affect. Her behavior is normal. Judgment and thought content normal.       Lab Review  Glucose (mg/dL)   Date Value   06/10/2020 389 (H)   03/09/2020 373 (H)   02/19/2020 145 (H)     Glucose, Arterial (mmol/L)   Date Value   10/14/2017 155     Sodium (mmol/L)   Date Value   06/10/2020 136   03/09/2020 141   02/19/2020 142     Potassium (mmol/L)   Date Value   06/10/2020 4.2   03/09/2020 3.5   02/19/2020 4.2     Chloride (mmol/L)   Date Value   06/10/2020 97 (L)   03/09/2020 102   02/19/2020 109 (H)     CO2 (mmol/L)   Date Value   06/10/2020 23.7   03/09/2020 25.0   02/19/2020 23.0     BUN (mg/dL)   Date Value   06/10/2020 10   03/09/2020 12   02/19/2020 17     Creatinine (mg/dL)   Date Value   06/10/2020 0.78   03/09/2020 1.00   02/19/2020 0.76     Hemoglobin A1C (%)   Date Value "   06/10/2020 8.50 (H)   03/09/2020 9.12 (H)   02/19/2020 9.40 (H)     Triglycerides (mg/dL)   Date Value   06/10/2020 181 (H)   03/09/2020 160 (H)   02/19/2020 106     LDL Cholesterol  (mg/dL)   Date Value   06/10/2020 68   03/09/2020 80   02/19/2020 67       Assessment/Plan      1. Mixed hyperlipidemia    2. Essential hypertension    3. Vitamin D deficiency    4. 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, with long-term current use of insulin (CMS/[facility])    .    Medications prescribed:  Outpatient Encounter Medications as of 9/9/2020   Medication Sig Dispense Refill   • ARIPiprazole (ABILIFY) 15 MG tablet Take 1 tablet by mouth Daily. 90 tablet 1   • aspirin 81 MG chewable tablet Chew 81 mg daily.     • atorvastatin (LIPITOR) 80 MG tablet TAKE 1 TABLET BY MOUTH EVERY DAY 90 tablet 1   • BD SHARPS CONTAINER HOME misc 1 each Take As Directed. 1 each 0   • Blood Glucose Monitoring Suppl (ONE TOUCH ULTRA MINI) w/Device kit USE AS DIRECTED TO CHECK BLOOD SUGAR 1 each 0   • Calcium Citrate-Vitamin D (CITRACAL/VITAMIN D) 250-200 MG-UNIT tablet Take 2 tablets by mouth 2 (two) times a day.     • clopidogrel (PLAVIX) 75 MG tablet TAKE 1 TABLET BY MOUTH EVERY DAY 90 tablet 1   • cyanocobalamin 1000 MCG/ML injection INJECT 1 ML INTO THE APPROPRIATE MUSCLE AS DIRECTED BY PRESCRIBER EVERY 28 (TWENTY-EIGHT) DAYS. 3 mL 0   • furosemide (LASIX) 40 MG tablet TAKE 1 TABLET BY MOUTH EVERY DAY 90 tablet 1   • gabapentin (NEURONTIN) 400 MG capsule Take 1 capsule by mouth 3 (Three) Times a Day. 90 capsule 2   • GLUCAGON EMERGENCY 1 MG injection USE AS DIRECTED AS NEEDED 1 kit 0   • glucose blood test strip Use to check blood sugar 4 times daily. accucheck 125 each 12   • glucose monitor monitoring kit 1 each Daily. accucheck eve meter, E11.9 1 each 0   • hydroCHLOROthiazide (HYDRODIURIL) 12.5 MG tablet Take 12.5 mg by mouth Daily.     • HYDROcodone-acetaminophen (Norco) 7.5-325 MG per tablet Take 1 tablet by  "mouth Every 6 (Six) Hours As Needed for Moderate Pain  or Severe Pain . 120 tablet 0   • Insulin Glargine (BASAGLAR KWIKPEN) 100 UNIT/ML injection pen Inject 100 units under skin in the the appropriate area as directed every night.  10 pen 2   • Insulin Pen Needle (B-D ULTRAFINE III SHORT PEN) 31G X 8 MM misc USE TO INJECT 4 TIMES A  each 11   • LORazepam (ATIVAN) 0.5 MG tablet Take 1 tablet by mouth Daily As Needed for Anxiety. 30 tablet 0   • meclizine (ANTIVERT) 25 MG tablet Take 1 tablet by mouth 3 (Three) Times a Day As Needed for dizziness. 90 tablet 6   • metoclopramide (REGLAN) 10 MG tablet TAKE 1 TABLET BY MOUTH 4 (FOUR) TIMES A DAY AS NEEDED (GASTROPARESIS). 120 tablet 1   • metoprolol succinate XL (TOPROL-XL) 25 MG 24 hr tablet TAKE 1 TABLET BY MOUTH EVERY DAY 31 tablet 6   • mirtazapine (REMERON) 45 MG tablet TAKE 1 TABLET BY MOUTH EVERY NIGHT. 90 tablet 0   • NOVOLOG FLEXPEN 100 UNIT/ML solution pen-injector sc pen INJECT 60 UNITS BEFORE MEALS 3 TIMES A  mL 3   • ondansetron (ZOFRAN) 8 MG tablet TAKE 1 TABLET BY MOUTH EVERY 8 (EIGHT) HOURS AS NEEDED FOR NAUSEA OR VOMITING. 18 tablet 1   • pantoprazole (PROTONIX) 20 MG EC tablet Take 1 tablet by mouth Daily. 90 tablet 3   • Syringe/Needle, Disp, (Luer Lock Safety Syringes) 25G X 5/8\" 3 ML misc 1 each Daily. 1 Q28 DAYS 1 each 2   • venlafaxine XR (EFFEXOR-XR) 150 MG 24 hr capsule TAKE 1 CAPSULE BY MOUTH TWICE A DAY 90 capsule 1   • vitamin D (ERGOCALCIFEROL) 1.25 MG (91823 UT) capsule capsule TAKE ONE CAPSULE BY MOUTH EVERY SUNDAY. 12 capsule 2   • meclizine (ANTIVERT) 25 MG tablet Take 1 tablet by mouth 3 (Three) Times a Day As Needed for Dizziness. 60 tablet 0     No facility-administered encounter medications on file as of 9/9/2020.        Orders placed during this encounter include:  Orders Placed This Encounter   Procedures   • Comprehensive Metabolic Panel     Standing Status:   Future     Standing Expiration Date:   9/9/2021   • " Hemoglobin A1c     Standing Status:   Future     Standing Expiration Date:   9/9/2021   • Lipid Panel     Standing Status:   Future     Standing Expiration Date:   9/9/2021   • Vitamin D 25 Hydroxy     Standing Status:   Future     Standing Expiration Date:   9/9/2021   • Vitamin B12     Standing Status:   Future     Standing Expiration Date:   9/9/2021   • TSH     Standing Status:   Future     Standing Expiration Date:   9/9/2021   • Protein / Creatinine Ratio, Urine - Urine, Clean Catch     Standing Status:   Future     Standing Expiration Date:   9/9/2021   • Microalbumin / Creatinine Urine Ratio - Urine, Clean Catch     Standing Status:   Future     Standing Expiration Date:   9/9/2021   • CBC & Differential     Standing Status:   Future     Standing Expiration Date:   9/9/2021     Order Specific Question:   Manual Differential     Answer:   No   Glycemic Management     Diabetes mellitus not controlled         Lab Results   Component Value Date    HGBA1C 8.50 (H) 06/10/2020                   Dexcom sensor ---off due to finances          Basaglar taking  55 am and 55 pm         ==================        Novolog      Taking 60 units --        I asked her to watch her mealtime sugars if she is staying above 180 after meals then give 64 instead of 60      Discussed carb counting but states cannot     She will need to look at her meals because 30 units may not be enough for some meals and for others meals to strong     If you eat a meal and give 30 units and your sugar is 200 or higher before the next meal look back at that meal and the next time you eat it either give more insulin or eat less     Also if you have a low after the meal give less insulin the next time you eat that meal         ==================     Jardiance-- stopped         ==================     Metformin 500 mg tablets - caused diarrhea --stopped      ====================     start bydureon - stopped        Victoza stopped due to side effects       Warned of the gastric side effects she does have gastroparesis but she wants to try      Gave a sample she will let me know if she wants an RX called in      If vomiting or abdominal pain stop        januvia 100 mg daily  -stopped      ------------------------------     Lipid Management        on Lipitor   on fenofibrate                 LDL needs to be 70 or less     add zetia              Total Cholesterol   Date Value Ref Range Status   06/10/2020 145 0 - 200 mg/dL Final            Triglycerides   Date Value Ref Range Status   06/10/2020 181 (H) 0 - 150 mg/dL Final            HDL Cholesterol   Date Value Ref Range Status   06/10/2020 41 40 - 60 mg/dL Final            LDL Cholesterol    Date Value Ref Range Status   06/10/2020 68 0 - 100 mg/dL Final                  Blood Pressure Management: Control of blood pressure  on ACEi     stopped the lasix         Microvascular Complication Monitoring:     Neurontin 400 mg po TID -- moderate relief but not complete     Component      Latest Ref Rng & Units 6/10/2020 6/10/2020           8:12 AM  8:12 AM   Protein/Creatinine Ratio      0.0 - 200.0 mg/G Crea 247.1 (H)     Creatinine, Urine      mg/dL 85.0 85.0   Total Protein, Urine      mg/dL 21.0     Microalbumin/Creatinine Ratio      mg/g   44.7   Microalbumin, Urine      mg/dL   3.8            intermittetly takes reglan for gastroparesis     states eye exam was March 2020                  Immunization - last influenza vaccine Oct. 2019        Other Diabetes Related Aspects     TSH abnormal from July to Sept 2014     TSH in the 5 to 7 range              Lab Results   Component Value Date     TSH 3.280 06/10/2020                  ---     Continue vitamin d supplement      April 2018     Vitamin d -28          missed doses         Component      Latest Ref Rng & Units 6/10/2020   25 Hydroxy, Vitamin D      30.0 - 100.0 ng/ml 30.1                     Lab Results   Component Value Date     [id number] 710 06/10/2020                        4. Follow-up: No follow-ups on file.    .

## 2020-09-21 ENCOUNTER — TELEPHONE (OUTPATIENT)
Dept: CARDIOLOGY | Facility: CLINIC | Age: 60
End: 2020-09-21

## 2020-09-21 DIAGNOSIS — G89.29 CHRONIC RIGHT-SIDED LOW BACK PAIN WITH RIGHT-SIDED SCIATICA: Chronic | ICD-10-CM

## 2020-09-21 DIAGNOSIS — M54.41 CHRONIC RIGHT-SIDED LOW BACK PAIN WITH RIGHT-SIDED SCIATICA: Chronic | ICD-10-CM

## 2020-09-21 NOTE — TELEPHONE ENCOUNTER
Called patient to remind her of the lab work that  ordered, there was no answer and unable to leave voicemail

## 2020-09-23 ENCOUNTER — TELEPHONE (OUTPATIENT)
Dept: PODIATRY | Facility: CLINIC | Age: 60
End: 2020-09-23

## 2020-09-23 ENCOUNTER — APPOINTMENT (OUTPATIENT)
Dept: PREADMISSION TESTING | Facility: HOSPITAL | Age: 60
End: 2020-09-23

## 2020-09-23 VITALS
HEIGHT: 68 IN | HEART RATE: 62 BPM | DIASTOLIC BLOOD PRESSURE: 90 MMHG | OXYGEN SATURATION: 98 % | SYSTOLIC BLOOD PRESSURE: 156 MMHG | BODY MASS INDEX: 37.59 KG/M2 | WEIGHT: 248 LBS

## 2020-09-23 LAB
ANION GAP SERPL CALCULATED.3IONS-SCNC: 12 MMOL/L (ref 5–15)
BUN SERPL-MCNC: 8 MG/DL (ref 6–20)
BUN/CREAT SERPL: 10.7 (ref 7–25)
CALCIUM SPEC-SCNC: 9.3 MG/DL (ref 8.6–10.5)
CHLORIDE SERPL-SCNC: 102 MMOL/L (ref 98–107)
CO2 SERPL-SCNC: 28 MMOL/L (ref 22–29)
CREAT SERPL-MCNC: 0.75 MG/DL (ref 0.57–1)
GFR SERPL CREATININE-BSD FRML MDRD: 79 ML/MIN/1.73
GLUCOSE SERPL-MCNC: 99 MG/DL (ref 65–99)
POTASSIUM SERPL-SCNC: 3.6 MMOL/L (ref 3.5–5.2)
SODIUM SERPL-SCNC: 142 MMOL/L (ref 136–145)

## 2020-09-23 PROCEDURE — 36415 COLL VENOUS BLD VENIPUNCTURE: CPT

## 2020-09-23 PROCEDURE — 93010 ELECTROCARDIOGRAM REPORT: CPT | Performed by: INTERNAL MEDICINE

## 2020-09-23 PROCEDURE — 80048 BASIC METABOLIC PNL TOTAL CA: CPT | Performed by: PODIATRIST

## 2020-09-23 PROCEDURE — 93005 ELECTROCARDIOGRAM TRACING: CPT

## 2020-09-23 RX ORDER — SODIUM CHLORIDE 9 MG/ML
1000 INJECTION, SOLUTION INTRAVENOUS CONTINUOUS
Status: CANCELLED | OUTPATIENT
Start: 2020-09-30

## 2020-09-23 NOTE — TELEPHONE ENCOUNTER
PT REQUESTS A CALL BACK RE IF SHE NEEDS TO STOP TAKING PLAVIX TODAY SINCE SHE IS SCHEDULED FOR SURGERY IN A WEEK.  CALL BACK # [phone].  THANK YOU.

## 2020-09-23 NOTE — DISCHARGE INSTRUCTIONS
[facility]  Pre-op Information and Guidelines    You will be called after 2 p.m. the day before your surgery (Friday for Monday surgery) and notified of your time for arrival and approximate surgery time.  If you have not received a call by 4P.M., please contact Same Day Surgery at [phone] of if outside [facility] call [phone].    Please Follow these Important Safety Guidelines:    • The morning of your procedure, take only the medications listed below with   A sip of water:_____________________________________________       ______________________________________________    • DO NOT eat or drink anything after 12:00 midnight the night before surgery  Specific instructions concerning drinking clear liquids will be discussed during  the pre-surgery instruction call the day before your surgery.    • If you take a blood thinner (ex. Plavix, Coumadin, aspirin), ask your doctor when to stop it before surgery  STOP DATE: _________________    • Only 2 visitors are allowed in patient rooms at a time  Your visitors will be asked to wait in the lobby until the admission process is complete with the exception of a parent with a child and patients in need of special assistance.    • YOU CANNOT DRIVE YOURSELF HOME  You must be accompanied by someone who will be responsible for driving you home after surgery and for your care at home.    • DO NOT chew gum, use breath mints, hard candy, or smoke the day of surgery  • DO NOT drink alcohol for at least 24 hours before your surgery  • DO NOT wear any jewelry and remove all body piercing before coming to the hospital  • DO NOT wear make-up to the hospital  • If you are having surgery on an extremity (arm/leg/foot) remove nail polish/artificial nails on the surgical side  • Clothing, glasses, contacts, dentures, and hairpieces must be removed before surgery  • Bathe the night before or the morning of your surgery and do not use powders/lotions on  skin.

## 2020-09-24 NOTE — TELEPHONE ENCOUNTER
Spoke with patient and advised her to stop plavix after dose on 09/25/2020 and she may continue her aspirin.

## 2020-09-27 ENCOUNTER — LAB (OUTPATIENT)
Dept: LAB | Facility: HOSPITAL | Age: 60
End: 2020-09-27

## 2020-09-27 LAB — SARS-COV-2 N GENE RESP QL NAA+PROBE: NOT DETECTED

## 2020-09-27 PROCEDURE — 87635 SARS-COV-2 COVID-19 AMP PRB: CPT | Performed by: PODIATRIST

## 2020-09-27 PROCEDURE — C9803 HOPD COVID-19 SPEC COLLECT: HCPCS | Performed by: PODIATRIST

## 2020-09-29 ENCOUNTER — OFFICE VISIT (OUTPATIENT)
Dept: PODIATRY | Facility: CLINIC | Age: 60
End: 2020-09-29

## 2020-09-29 VITALS — WEIGHT: 248 LBS | OXYGEN SATURATION: 98 % | BODY MASS INDEX: 37.59 KG/M2 | HEIGHT: 68 IN | HEART RATE: 92 BPM

## 2020-09-29 DIAGNOSIS — T85.848A PAIN FROM IMPLANTED HARDWARE, INITIAL ENCOUNTER: ICD-10-CM

## 2020-09-29 DIAGNOSIS — M21.172 SUBTALAR VARUS, ACQUIRED, LEFT: ICD-10-CM

## 2020-09-29 DIAGNOSIS — Q66.70 PES CAVUS: ICD-10-CM

## 2020-09-29 DIAGNOSIS — R26.81 GAIT INSTABILITY: ICD-10-CM

## 2020-09-29 DIAGNOSIS — M96.0 NONUNION OF SUBTALAR ARTHRODESIS: Primary | ICD-10-CM

## 2020-09-29 PROCEDURE — 99214 OFFICE O/P EST MOD 30 MIN: CPT | Performed by: PODIATRIST

## 2020-09-29 NOTE — PROGRESS NOTES
Arianajudy Martinez  1960  60 y.o. female   PCP: Kimberly Ferguson 05/26/2020  BS - 148 per pt.    Patient presents today for a follow up on left foot and to discuss surgery.    09/29/2020          Chief Complaint   Patient presents with   • Left Foot - Follow-up       History of Present Illness    Ms Martinez is a 61 y/o diabetic female who presents for follow-up of left subtalar joint arthrodesis, hardware removal and gastrocnemius recession.  Continues to note some swelling and pain to her left foot and ankle.  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  She has been ambulating in her low tide Cam boot to help with gait instability and pain, states she has had a couple falls lately.     Past Medical History:   Diagnosis Date   • Angina, class IV (CMS/HCC)    • Anxiety    • Anxiety and depression    • Benign paroxysmal positional vertigo    • Bladder disorder     has bladder stimulator   • Carpal tunnel syndrome    • Chronic pain    • Coronary atherosclerosis     hx CABG 2005.  is followed by Dr Kwon   • Depression    • Diabetes mellitus (CMS/HCC)     Type 2, controlled   • Diabetic polyneuropathy (CMS/HCC)    • Disease of thyroid gland    • Elevated cholesterol    • Female stress incontinence    • Foot pain, left    • Full dentures    • Gastroparesis    • GERD (gastroesophageal reflux disease)    • Hyperlipidemia    • Hypertension    • Low back pain    • Malaise and fatigue    • Multiple joint pain    • Obesity     Refuses to be weighed   • Otalgia     Both   • Perforation of tympanic membrane     Left   • Postoperative wound infection    • Vitamin D deficiency    • Wears glasses     reading         Past Surgical History:   Procedure Laterality Date   • ABDOMINAL SURGERY     • ANGIOPLASTY      coronary   • BREAST BIOPSY Right    • CARDIAC CATHETERIZATION     • CARDIAC CATHETERIZATION N/A 6/20/2017    Procedure: Right Heart Cath;  Surgeon: Can Kwon MD PhD;   Location: [facility] CATH INVASIVE LOCATION;  Service:    • CARDIAC CATHETERIZATION N/A 2/18/2020    Procedure: Left Heart Cath;  Surgeon: Catalina Cooper MD;  Location: [facility] CATH INVASIVE LOCATION;  Service: Cardiology;  Laterality: N/A;   • CARPAL TUNNEL RELEASE     • CHOLECYSTECTOMY     • COLONOSCOPY N/A 6/24/2020    Procedure: COLONOSCOPY;  Surgeon: Julián Maldonado MD;  Location: [facility] ENDOSCOPY;  Service: Gastroenterology;  Laterality: N/A;   • CORONARY ARTERY BYPASS GRAFT  2005   • ENDOSCOPY N/A 10/19/2018    Procedure: ESOPHAGOGASTRODUODENOSCOPY possible dilation;  Surgeon: Julián Maldonado MD;  Location: [facility] ENDOSCOPY;  Service: Gastroenterology   • ENDOSCOPY N/A 6/24/2020    Procedure: ESOPHAGOGASTRODUODENOSCOPY WED appt please;  Surgeon: Julián Maldonado MD;  Location: [facility] ENDOSCOPY;  Service: Gastroenterology;  Laterality: N/A;   • ENDOSCOPY AND COLONOSCOPY     • FOOT SURGERY      Toes   • FOOT SURGERY     • GASTRIC BANDING      Revision, laparoscopic   • HYSTERECTOMY     • MOUTH SURGERY     • SALPINGO OOPHORECTOMY     • SHOULDER SURGERY     • SUBTALAR ARTHRODESIS Left 1/16/2019    Procedure: LEFT FOOT HARDWARE REMOVAL, FIFTH METATARSAL , OPEN REDUCTION INTERNAL FIXATION, CALCANEAL OSTEOTOMY;  Surgeon: Ignacio Lord DPM;  Location: [facility] OR;  Service: Podiatry   • SUBTALAR ARTHRODESIS Left 10/16/2019    Procedure: foot hardware removal, subtalar joint fusion  possible de/reattachment of achilles tendon        (c-arm);  Surgeon: Ignacio Lord DPM;  Location: [facility] OR;  Service: Podiatry   • TRANSESOPHAGEAL ECHOCARDIOGRAM (LAMONTE)      With color flow         Family History   Problem Relation Age of Onset   • Diabetes Other    • Heart disease Other    • Hypertension Other    • Heart disease Mother    • Stroke Mother    • Hypertension Mother    • Diabetes Sister    • Heart disease Sister    • Hypertension Sister    • Heart disease Sister    • Diabetes Sister    • Hypertension Sister     • Diabetes Sister    • Diabetes Sister    • Diabetes Sister    • Diabetes Sister          Social History     Socioeconomic History   • Marital status:      Spouse name: Not on file   • Number of children: Not on file   • Years of education: Not on file   • Highest education level: Not on file   Tobacco Use   • Smoking status: Never Smoker   • Smokeless tobacco: Never Used   Substance and Sexual Activity   • Alcohol use: No   • Drug use: No   • Sexual activity: Defer         Current Outpatient Medications   Medication Sig Dispense Refill   • ARIPiprazole (ABILIFY) 15 MG tablet Take 1 tablet by mouth Daily. 90 tablet 1   • aspirin 81 MG chewable tablet Chew 81 mg daily.     • atorvastatin (LIPITOR) 80 MG tablet TAKE 1 TABLET BY MOUTH EVERY DAY (Patient taking differently: Take 80 mg by mouth Every Night.) 90 tablet 1   • BD SHARPS CONTAINER HOME misc 1 each Take As Directed. 1 each 0   • Blood Glucose Monitoring Suppl (ONE TOUCH ULTRA MINI) w/Device kit USE AS DIRECTED TO CHECK BLOOD SUGAR 1 each 0   • Calcium Citrate-Vitamin D (CITRACAL/VITAMIN D) 250-200 MG-UNIT tablet Take 2 tablets by mouth 2 (two) times a day.     • clopidogrel (PLAVIX) 75 MG tablet TAKE 1 TABLET BY MOUTH EVERY DAY (Patient taking differently: Take 75 mg by mouth Daily.) 90 tablet 1   • cyanocobalamin 1000 MCG/ML injection INJECT 1 ML INTO THE APPROPRIATE MUSCLE AS DIRECTED BY PRESCRIBER EVERY 28 (TWENTY-EIGHT) DAYS. 3 mL 0   • furosemide (LASIX) 40 MG tablet TAKE 1 TABLET BY MOUTH EVERY DAY (Patient taking differently: Take 40 mg by mouth Daily.) 90 tablet 1   • gabapentin (NEURONTIN) 400 MG capsule Take 1 capsule by mouth 3 (Three) Times a Day. 90 capsule 2   • GLUCAGON EMERGENCY 1 MG injection USE AS DIRECTED AS NEEDED 1 kit 0   • glucose blood test strip Use to check blood sugar 4 times daily. accucheck 125 each 12   • glucose monitor monitoring kit 1 each Daily. accucheck eve meter, E11.9 1 each 0   • hydroCHLOROthiazide  "(HYDRODIURIL) 12.5 MG tablet Take 12.5 mg by mouth Daily.     • HYDROcodone-acetaminophen (Norco) 7.5-325 MG per tablet Take 1 tablet by mouth Every 6 (Six) Hours As Needed for Moderate Pain  or Severe Pain . 120 tablet 0   • Insulin Glargine (BASAGLAR KWIKPEN) 100 UNIT/ML injection pen Inject 100 units under skin in the the appropriate area as directed every night.  (Patient taking differently: 60 Units Every Night.) 10 pen 2   • Insulin Pen Needle (B-D ULTRAFINE III SHORT PEN) 31G X 8 MM misc USE TO INJECT 4 TIMES A  each 11   • LORazepam (ATIVAN) 0.5 MG tablet Take 1 tablet by mouth Daily As Needed for Anxiety. (Patient taking differently: Take 0.5 mg by mouth Daily.) 30 tablet 0   • meclizine (ANTIVERT) 25 MG tablet Take 1 tablet by mouth 3 (Three) Times a Day As Needed for dizziness. 90 tablet 6   • metoclopramide (REGLAN) 10 MG tablet TAKE 1 TABLET BY MOUTH 4 (FOUR) TIMES A DAY AS NEEDED (GASTROPARESIS). 120 tablet 1   • metoprolol succinate XL (TOPROL-XL) 25 MG 24 hr tablet TAKE 1 TABLET BY MOUTH EVERY DAY (Patient taking differently: Take 25 mg by mouth Daily.) 31 tablet 6   • mirtazapine (REMERON) 45 MG tablet TAKE 1 TABLET BY MOUTH EVERY NIGHT. 90 tablet 0   • NOVOLOG FLEXPEN 100 UNIT/ML solution pen-injector sc pen INJECT 60 UNITS BEFORE MEALS 3 TIMES A DAY (Patient taking differently: Inject 60 Units under the skin into the appropriate area as directed 3 (Three) Times a Day With Meals.) 162 mL 3   • ondansetron (ZOFRAN) 8 MG tablet TAKE 1 TABLET BY MOUTH EVERY 8 (EIGHT) HOURS AS NEEDED FOR NAUSEA OR VOMITING. 18 tablet 1   • pantoprazole (PROTONIX) 20 MG EC tablet Take 1 tablet by mouth Daily. 90 tablet 3   • Syringe/Needle, Disp, (Luer Lock Safety Syringes) 25G X 5/8\" 3 ML misc 1 each Daily. 1 Q28 DAYS 1 each 2   • venlafaxine XR (EFFEXOR-XR) 150 MG 24 hr capsule TAKE 1 CAPSULE BY MOUTH TWICE A DAY (Patient taking differently: Take 150 mg by mouth.) 90 capsule 1   • vitamin D (ERGOCALCIFEROL) " "1.25 MG (90114 UT) capsule capsule TAKE ONE CAPSULE BY MOUTH EVERY SUNDAY. (Patient taking differently: Take 50,000 Units by mouth Every 7 (Seven) Days.) 12 capsule 2     No current facility-administered medications for this visit.      Review of Systems   Constitutional: Negative.    HENT: Negative.    Eyes: Negative.    Respiratory: Negative.    Cardiovascular: Negative.    Gastrointestinal: Negative.    Endocrine: Negative.    Genitourinary: Negative.    Musculoskeletal: Negative.         Left foot pain    Skin: Negative.    Allergic/Immunologic: Negative.    Neurological: Positive for numbness.   Hematological: Negative.    Psychiatric/Behavioral: Negative.          OBJECTIVE    Pulse 92   Ht 172.7 cm (68\")   Wt 112 kg (248 lb)   SpO2 98%   BMI 37.71 kg/m²       Physical Exam   Constitutional: she appears well-developed and well-nourished.   HEENT: Normocephalic. Atraumatic.  CV: No CP. RRR  Resp: Non-labored respirations.  Psychiatric: she has a normal mood and affect. her behavior is normal.           Lower Extremity Exam:  Vascular: DP/PT pulses palpable 1+.   Positive hair growth.   Left ankle perimalleolar edema  Neuro: Protective sensation diminished to lesser toes, b/l.  DTRs intact  Integument: No open wounds or lesions.  Skin quality normal  No masses  Webspaces c/d/i  Musculoskeletal: LE muscle strength 5/5.   Gait mildly antalgic, unassisted  Subtalar joint in mild residual varus, rigid.  Residual pes cavus with no tenderness to palpation of plantar cuboid noted.  Talonavicular joint range of motion limited, adducted  Moderate tenderness palpation of plantar medial calcaneal tubercle  Ankle ROM within normal limits.  No pain or crepitus, b/l        CT left foot.     CLINICAL INDICATION: Prior fracture, arthrodesis. Evaluate for  nonunion.        COMPARISON: Left foot June 1, 2020.        TECHNIQUE: Noncontrast study. Multiplanar noncontrast images.  Helical scanning with axial and coronal " reformations. Soft  tissue, lung, and bone windows reviewed.     This exam was performed according to our departmental  dose-optimization program, which includes automated exposure  control, adjustment of the mA and/or kV according to patient size  and/or use of iterative reconstruction technique.      CT FINDINGS: Talocalcaneal surgical arthrodesis stabilized by  two large caliber surgical screws.     Significant radiolucency surrounding these screws indicating  loosening.     Widened somewhat irregular articulation between the talus and the  calcaneus. This suggests nonunion, pseudoarthrosis.     IMPRESSION:  As above.     Electronically signed by:  Naveed Taylor MD  6/5/2020 4:11 PM CDT  Workstation: [id number]      Procedures         ASSESSMENT AND PLAN    Ariana was seen today for follow-up.    Diagnoses and all orders for this visit:    Nonunion of subtalar arthrodesis  -     XR Foot 3+ View Left    Pes cavus    Subtalar varus, acquired, left    Gait instability    Pain from implanted hardware, initial encounter        -Radiographs ordered and reviewed.    -Patient has persistent adduction contracture at the talonavicular joint along with non-union of subtalar joint.  I did discuss revision of her subtalar joint fusion with addition of talonavicular joint fusion to improve correction of residual cavus deformity.  Patient is ready to proceed.  Discussed all risk, benefits and potential complication as well as expected postoperative course.  Will plan for same day admit for pain control. PT/OT eval            This document has been electronically signed by Ignacio Lord DPM on September 30, 2020 07:11 CDT

## 2020-09-30 ENCOUNTER — APPOINTMENT (OUTPATIENT)
Dept: GENERAL RADIOLOGY | Facility: HOSPITAL | Age: 60
End: 2020-09-30

## 2020-09-30 ENCOUNTER — HOSPITAL ENCOUNTER (OUTPATIENT)
Facility: HOSPITAL | Age: 60
Discharge: HOME | End: 2020-10-03
Attending: PODIATRIST | Admitting: PODIATRIST

## 2020-09-30 ENCOUNTER — APPOINTMENT (OUTPATIENT)
Dept: INTERVENTIONAL RADIOLOGY/VASCULAR | Facility: HOSPITAL | Age: 60
End: 2020-09-30

## 2020-09-30 ENCOUNTER — ANESTHESIA EVENT (OUTPATIENT)
Dept: PERIOP | Facility: HOSPITAL | Age: 60
End: 2020-09-30

## 2020-09-30 ENCOUNTER — ANESTHESIA (OUTPATIENT)
Dept: PERIOP | Facility: HOSPITAL | Age: 60
End: 2020-09-30

## 2020-09-30 ENCOUNTER — APPOINTMENT (OUTPATIENT)
Dept: ULTRASOUND IMAGING | Facility: HOSPITAL | Age: 60
End: 2020-09-30

## 2020-09-30 DIAGNOSIS — M96.0 NONUNION OF SUBTALAR ARTHRODESIS: ICD-10-CM

## 2020-09-30 DIAGNOSIS — Z74.09 IMPAIRED PHYSICAL MOBILITY: ICD-10-CM

## 2020-09-30 DIAGNOSIS — M21.172 HINDFOOT VARUS, ACQUIRED, LEFT: Primary | ICD-10-CM

## 2020-09-30 DIAGNOSIS — Z78.9 IMPAIRED MOBILITY AND ADLS: ICD-10-CM

## 2020-09-30 DIAGNOSIS — M21.172 SUBTALAR VARUS, ACQUIRED, LEFT: ICD-10-CM

## 2020-09-30 DIAGNOSIS — Z74.09 IMPAIRED MOBILITY AND ADLS: ICD-10-CM

## 2020-09-30 DIAGNOSIS — Q66.70 PES CAVUS: ICD-10-CM

## 2020-09-30 LAB
GLUCOSE BLDC GLUCOMTR-MCNC: 114 MG/DL (ref 70–130)
GLUCOSE BLDC GLUCOMTR-MCNC: 257 MG/DL (ref 70–130)
GLUCOSE BLDC GLUCOMTR-MCNC: 313 MG/DL (ref 70–130)

## 2020-09-30 PROCEDURE — 25010000002 ONDANSETRON PER 1 MG: Performed by: NURSE ANESTHETIST, CERTIFIED REGISTERED

## 2020-09-30 PROCEDURE — 25010000002 HYDROMORPHONE 1 MG/ML SOLUTION: Performed by: NURSE ANESTHETIST, CERTIFIED REGISTERED

## 2020-09-30 PROCEDURE — C1713 ANCHOR/SCREW BN/BN,TIS/BN: HCPCS | Performed by: PODIATRIST

## 2020-09-30 PROCEDURE — 28725 ARTHRODESIS SUBTALAR: CPT | Performed by: PODIATRIST

## 2020-09-30 PROCEDURE — C1769 GUIDE WIRE: HCPCS | Performed by: PODIATRIST

## 2020-09-30 PROCEDURE — C1751 CATH, INF, PER/CENT/MIDLINE: HCPCS

## 2020-09-30 PROCEDURE — 28740 FUSION OF FOOT BONES: CPT | Performed by: PODIATRIST

## 2020-09-30 PROCEDURE — 76937 US GUIDE VASCULAR ACCESS: CPT

## 2020-09-30 PROCEDURE — 82962 GLUCOSE BLOOD TEST: CPT

## 2020-09-30 PROCEDURE — 25010000002 PROPOFOL 10 MG/ML EMULSION: Performed by: NURSE ANESTHETIST, CERTIFIED REGISTERED

## 2020-09-30 PROCEDURE — 25010000002 ROPIVACAINE PER 1 MG: Performed by: ANESTHESIOLOGY

## 2020-09-30 PROCEDURE — 25010000002 FENTANYL CITRATE (PF) 100 MCG/2ML SOLUTION: Performed by: NURSE ANESTHETIST, CERTIFIED REGISTERED

## 2020-09-30 PROCEDURE — 76000 FLUOROSCOPY <1 HR PHYS/QHP: CPT

## 2020-09-30 PROCEDURE — 25010000002 HYDROMORPHONE 1 MG/ML SOLUTION: Performed by: PODIATRIST

## 2020-09-30 PROCEDURE — 36410 VNPNXR 3YR/> PHY/QHP DX/THER: CPT

## 2020-09-30 PROCEDURE — 25010000002 DEXAMETHASONE PER 1 MG: Performed by: NURSE ANESTHETIST, CERTIFIED REGISTERED

## 2020-09-30 PROCEDURE — G0378 HOSPITAL OBSERVATION PER HR: HCPCS

## 2020-09-30 PROCEDURE — 25010000002 PHENYLEPHRINE PER 1 ML: Performed by: NURSE ANESTHETIST, CERTIFIED REGISTERED

## 2020-09-30 PROCEDURE — 25010000003 LIDOCAINE 1 % SOLUTION: Performed by: NURSE ANESTHETIST, CERTIFIED REGISTERED

## 2020-09-30 PROCEDURE — 63710000001 INSULIN ASPART PER 5 UNITS: Performed by: PODIATRIST

## 2020-09-30 DEVICE — IMPLANTABLE DEVICE: Type: IMPLANTABLE DEVICE | Site: FOOT | Status: FUNCTIONAL

## 2020-09-30 DEVICE — WASHER: Type: IMPLANTABLE DEVICE | Status: FUNCTIONAL

## 2020-09-30 DEVICE — CANNULATED SCREW
Type: IMPLANTABLE DEVICE | Status: FUNCTIONAL
Brand: ASNIS

## 2020-09-30 DEVICE — HEADLESS COMPRESSION SCREW
Type: IMPLANTABLE DEVICE | Status: FUNCTIONAL
Brand: FIXOS

## 2020-09-30 DEVICE — BONE CANC/CORT BLND 80/20 30CC: Type: IMPLANTABLE DEVICE | Site: FOOT | Status: FUNCTIONAL

## 2020-09-30 RX ORDER — FENTANYL CITRATE 50 UG/ML
INJECTION, SOLUTION INTRAMUSCULAR; INTRAVENOUS AS NEEDED
Status: DISCONTINUED | OUTPATIENT
Start: 2020-09-30 | End: 2020-09-30 | Stop reason: SURG

## 2020-09-30 RX ORDER — BUPIVACAINE HCL/0.9 % NACL/PF 0.1 %
2 PLASTIC BAG, INJECTION (ML) EPIDURAL ONCE
Status: COMPLETED | OUTPATIENT
Start: 2020-09-30 | End: 2020-09-30

## 2020-09-30 RX ORDER — FUROSEMIDE 40 MG/1
40 TABLET ORAL DAILY
Status: DISCONTINUED | OUTPATIENT
Start: 2020-09-30 | End: 2020-10-03 | Stop reason: HOSPADM

## 2020-09-30 RX ORDER — PROPOFOL 10 MG/ML
VIAL (ML) INTRAVENOUS AS NEEDED
Status: DISCONTINUED | OUTPATIENT
Start: 2020-09-30 | End: 2020-09-30 | Stop reason: SURG

## 2020-09-30 RX ORDER — ARIPIPRAZOLE 15 MG/1
15 TABLET ORAL DAILY
Status: CANCELLED | OUTPATIENT
Start: 2020-09-30

## 2020-09-30 RX ORDER — PANTOPRAZOLE SODIUM 20 MG/1
20 TABLET, DELAYED RELEASE ORAL DAILY
Status: DISCONTINUED | OUTPATIENT
Start: 2020-10-01 | End: 2020-10-03 | Stop reason: HOSPADM

## 2020-09-30 RX ORDER — DEXAMETHASONE SODIUM PHOSPHATE 4 MG/ML
INJECTION, SOLUTION INTRA-ARTICULAR; INTRALESIONAL; INTRAMUSCULAR; INTRAVENOUS; SOFT TISSUE AS NEEDED
Status: DISCONTINUED | OUTPATIENT
Start: 2020-09-30 | End: 2020-09-30 | Stop reason: SURG

## 2020-09-30 RX ORDER — ROPIVACAINE HYDROCHLORIDE 5 MG/ML
INJECTION, SOLUTION EPIDURAL; INFILTRATION; PERINEURAL
Status: COMPLETED | OUTPATIENT
Start: 2020-09-30 | End: 2020-09-30

## 2020-09-30 RX ORDER — LIDOCAINE HYDROCHLORIDE 10 MG/ML
INJECTION, SOLUTION INFILTRATION; PERINEURAL AS NEEDED
Status: DISCONTINUED | OUTPATIENT
Start: 2020-09-30 | End: 2020-09-30 | Stop reason: SURG

## 2020-09-30 RX ORDER — ONDANSETRON 4 MG/1
8 TABLET, FILM COATED ORAL EVERY 8 HOURS PRN
Status: DISCONTINUED | OUTPATIENT
Start: 2020-09-30 | End: 2020-10-03 | Stop reason: HOSPADM

## 2020-09-30 RX ORDER — ONDANSETRON 2 MG/ML
INJECTION INTRAMUSCULAR; INTRAVENOUS AS NEEDED
Status: DISCONTINUED | OUTPATIENT
Start: 2020-09-30 | End: 2020-09-30 | Stop reason: SURG

## 2020-09-30 RX ORDER — METOCLOPRAMIDE 10 MG/1
10 TABLET ORAL 4 TIMES DAILY PRN
Status: DISCONTINUED | OUTPATIENT
Start: 2020-09-30 | End: 2020-10-03 | Stop reason: HOSPADM

## 2020-09-30 RX ORDER — ATORVASTATIN CALCIUM 40 MG/1
80 TABLET, FILM COATED ORAL NIGHTLY
Status: DISCONTINUED | OUTPATIENT
Start: 2020-09-30 | End: 2020-10-03 | Stop reason: HOSPADM

## 2020-09-30 RX ORDER — METOPROLOL SUCCINATE 25 MG/1
25 TABLET, EXTENDED RELEASE ORAL DAILY
Status: DISCONTINUED | OUTPATIENT
Start: 2020-10-01 | End: 2020-10-03 | Stop reason: HOSPADM

## 2020-09-30 RX ORDER — HYDROCODONE BITARTRATE AND ACETAMINOPHEN 10; 325 MG/1; MG/1
1 TABLET ORAL EVERY 4 HOURS PRN
Status: DISCONTINUED | OUTPATIENT
Start: 2020-09-30 | End: 2020-10-01

## 2020-09-30 RX ORDER — VENLAFAXINE HYDROCHLORIDE 75 MG/1
150 CAPSULE, EXTENDED RELEASE ORAL
Status: DISCONTINUED | OUTPATIENT
Start: 2020-10-01 | End: 2020-10-03 | Stop reason: HOSPADM

## 2020-09-30 RX ORDER — MIRTAZAPINE 15 MG/1
45 TABLET, FILM COATED ORAL NIGHTLY
Status: DISCONTINUED | OUTPATIENT
Start: 2020-09-30 | End: 2020-10-03 | Stop reason: HOSPADM

## 2020-09-30 RX ORDER — LORAZEPAM 0.5 MG/1
0.5 TABLET ORAL DAILY
Status: DISCONTINUED | OUTPATIENT
Start: 2020-09-30 | End: 2020-10-03 | Stop reason: HOSPADM

## 2020-09-30 RX ORDER — MECLIZINE HYDROCHLORIDE 25 MG/1
25 TABLET ORAL 3 TIMES DAILY PRN
Status: DISCONTINUED | OUTPATIENT
Start: 2020-09-30 | End: 2020-10-03 | Stop reason: HOSPADM

## 2020-09-30 RX ORDER — SCOLOPAMINE TRANSDERMAL SYSTEM 1 MG/1
1 PATCH, EXTENDED RELEASE TRANSDERMAL CONTINUOUS
Status: ACTIVE | OUTPATIENT
Start: 2020-09-30 | End: 2020-10-01

## 2020-09-30 RX ORDER — NICOTINE POLACRILEX 4 MG
15 LOZENGE BUCCAL
Status: DISCONTINUED | OUTPATIENT
Start: 2020-09-30 | End: 2020-10-03 | Stop reason: HOSPADM

## 2020-09-30 RX ORDER — HYDROCHLOROTHIAZIDE 12.5 MG/1
12.5 TABLET ORAL DAILY
Status: DISCONTINUED | OUTPATIENT
Start: 2020-09-30 | End: 2020-10-03 | Stop reason: HOSPADM

## 2020-09-30 RX ORDER — ONDANSETRON 2 MG/ML
4 INJECTION INTRAMUSCULAR; INTRAVENOUS ONCE AS NEEDED
Status: DISCONTINUED | OUTPATIENT
Start: 2020-09-30 | End: 2020-09-30 | Stop reason: HOSPADM

## 2020-09-30 RX ORDER — LABETALOL HYDROCHLORIDE 5 MG/ML
INJECTION, SOLUTION INTRAVENOUS AS NEEDED
Status: DISCONTINUED | OUTPATIENT
Start: 2020-09-30 | End: 2020-09-30 | Stop reason: SURG

## 2020-09-30 RX ORDER — ASPIRIN 81 MG/1
81 TABLET, CHEWABLE ORAL DAILY
Status: DISCONTINUED | OUTPATIENT
Start: 2020-10-01 | End: 2020-10-03 | Stop reason: HOSPADM

## 2020-09-30 RX ORDER — DEXTROSE MONOHYDRATE 25 G/50ML
25 INJECTION, SOLUTION INTRAVENOUS
Status: DISCONTINUED | OUTPATIENT
Start: 2020-09-30 | End: 2020-10-03 | Stop reason: HOSPADM

## 2020-09-30 RX ORDER — CLOPIDOGREL BISULFATE 75 MG/1
75 TABLET ORAL DAILY
Status: DISCONTINUED | OUTPATIENT
Start: 2020-10-01 | End: 2020-10-03 | Stop reason: HOSPADM

## 2020-09-30 RX ORDER — SODIUM CHLORIDE 9 MG/ML
1000 INJECTION, SOLUTION INTRAVENOUS CONTINUOUS
Status: DISPENSED | OUTPATIENT
Start: 2020-09-30 | End: 2020-10-02

## 2020-09-30 RX ORDER — GABAPENTIN 400 MG/1
400 CAPSULE ORAL 3 TIMES DAILY
Status: DISCONTINUED | OUTPATIENT
Start: 2020-09-30 | End: 2020-10-03 | Stop reason: HOSPADM

## 2020-09-30 RX ORDER — EPHEDRINE SULFATE 50 MG/ML
INJECTION, SOLUTION INTRAVENOUS AS NEEDED
Status: DISCONTINUED | OUTPATIENT
Start: 2020-09-30 | End: 2020-09-30 | Stop reason: SURG

## 2020-09-30 RX ADMIN — HYDROMORPHONE HYDROCHLORIDE 0.5 MG: 1 INJECTION, SOLUTION INTRAMUSCULAR; INTRAVENOUS; SUBCUTANEOUS at 15:52

## 2020-09-30 RX ADMIN — HYDROCODONE BITARTRATE AND ACETAMINOPHEN 1 TABLET: 10; 325 TABLET ORAL at 18:03

## 2020-09-30 RX ADMIN — PROPOFOL 180 MG: 10 INJECTION, EMULSION INTRAVENOUS at 10:23

## 2020-09-30 RX ADMIN — Medication 2 G: at 14:34

## 2020-09-30 RX ADMIN — HYDROMORPHONE HYDROCHLORIDE 0.5 MG: 1 INJECTION, SOLUTION INTRAMUSCULAR; INTRAVENOUS; SUBCUTANEOUS at 21:06

## 2020-09-30 RX ADMIN — HYDROMORPHONE HYDROCHLORIDE 0.5 MG: 1 INJECTION, SOLUTION INTRAMUSCULAR; INTRAVENOUS; SUBCUTANEOUS at 16:28

## 2020-09-30 RX ADMIN — ROPIVACAINE HYDROCHLORIDE 30 ML: 5 INJECTION, SOLUTION EPIDURAL; INFILTRATION; PERINEURAL at 10:19

## 2020-09-30 RX ADMIN — SCOPALAMINE 1 PATCH: 1 PATCH, EXTENDED RELEASE TRANSDERMAL at 08:57

## 2020-09-30 RX ADMIN — FENTANYL CITRATE 50 MCG: 50 INJECTION, SOLUTION INTRAMUSCULAR; INTRAVENOUS at 11:12

## 2020-09-30 RX ADMIN — HYDROMORPHONE HYDROCHLORIDE 0.5 MG: 1 INJECTION, SOLUTION INTRAMUSCULAR; INTRAVENOUS; SUBCUTANEOUS at 17:13

## 2020-09-30 RX ADMIN — EPHEDRINE SULFATE 10 MG: 50 INJECTION INTRAVENOUS at 10:49

## 2020-09-30 RX ADMIN — FENTANYL CITRATE 50 MCG: 50 INJECTION, SOLUTION INTRAMUSCULAR; INTRAVENOUS at 10:29

## 2020-09-30 RX ADMIN — LORAZEPAM 0.5 MG: 0.5 TABLET ORAL at 17:13

## 2020-09-30 RX ADMIN — EPHEDRINE SULFATE 10 MG: 50 INJECTION INTRAVENOUS at 10:59

## 2020-09-30 RX ADMIN — SODIUM CHLORIDE 1000 ML: 900 INJECTION, SOLUTION INTRAVENOUS at 09:00

## 2020-09-30 RX ADMIN — FENTANYL CITRATE 50 MCG: 50 INJECTION, SOLUTION INTRAMUSCULAR; INTRAVENOUS at 15:05

## 2020-09-30 RX ADMIN — INSULIN ASPART 16 UNITS: 100 INJECTION, SOLUTION INTRAVENOUS; SUBCUTANEOUS at 17:51

## 2020-09-30 RX ADMIN — Medication 2 G: at 10:34

## 2020-09-30 RX ADMIN — LABETALOL HYDROCHLORIDE 5 MG: 5 INJECTION, SOLUTION INTRAVENOUS at 14:29

## 2020-09-30 RX ADMIN — ATORVASTATIN CALCIUM 80 MG: 40 TABLET, FILM COATED ORAL at 20:52

## 2020-09-30 RX ADMIN — FENTANYL CITRATE 50 MCG: 50 INJECTION, SOLUTION INTRAMUSCULAR; INTRAVENOUS at 13:40

## 2020-09-30 RX ADMIN — Medication 2 G: at 20:53

## 2020-09-30 RX ADMIN — DEXAMETHASONE SODIUM PHOSPHATE 4 MG: 4 INJECTION, SOLUTION INTRAMUSCULAR; INTRAVENOUS at 10:44

## 2020-09-30 RX ADMIN — FUROSEMIDE 40 MG: 40 TABLET ORAL at 17:13

## 2020-09-30 RX ADMIN — GABAPENTIN 400 MG: 400 CAPSULE ORAL at 17:13

## 2020-09-30 RX ADMIN — HYDROCHLOROTHIAZIDE 12.5 MG: 12.5 TABLET ORAL at 17:13

## 2020-09-30 RX ADMIN — ONDANSETRON 4 MG: 2 INJECTION INTRAMUSCULAR; INTRAVENOUS at 14:43

## 2020-09-30 RX ADMIN — GABAPENTIN 400 MG: 400 CAPSULE ORAL at 20:52

## 2020-09-30 RX ADMIN — FENTANYL CITRATE 50 MCG: 50 INJECTION, SOLUTION INTRAMUSCULAR; INTRAVENOUS at 14:10

## 2020-09-30 RX ADMIN — MIRTAZAPINE 45 MG: 15 TABLET, FILM COATED ORAL at 20:53

## 2020-09-30 RX ADMIN — SODIUM CHLORIDE: 900 INJECTION, SOLUTION INTRAVENOUS at 12:33

## 2020-09-30 RX ADMIN — PHENYLEPHRINE HYDROCHLORIDE 100 MCG: 10 INJECTION INTRAVENOUS at 11:09

## 2020-09-30 RX ADMIN — LIDOCAINE HYDROCHLORIDE 30 MG: 10 INJECTION, SOLUTION INFILTRATION; PERINEURAL at 10:23

## 2020-09-30 NOTE — ANESTHESIA PROCEDURE NOTES
Airway  Urgency: elective    Date/Time: 9/30/2020 10:25 AM    General Information and Staff    Patient location during procedure: OR  CRNA: Larry Morales CRNA    Indications and Patient Condition  Indications for airway management: airway protection    Preoxygenated: yes  Mask difficulty assessment: 1 - vent by mask    Final Airway Details  Final airway type: supraglottic airway      Successful airway: I-gel  Size 5    Number of attempts at approach: 1  Assessment: lips, teeth, and gum same as pre-op and atraumatic intubation

## 2020-09-30 NOTE — ANESTHESIA PROCEDURE NOTES
Peripheral Block      Patient reassessed immediately prior to procedure    Start time: 9/30/2020 10:13 AM  Stop time: 9/30/2020 10:19 AM  Reason for block: post-op pain management  Performed by  CRNA: Ron Smith CRNA  Assisted by: Larry Morales CRNA  Preanesthetic Checklist  Completed: patient identified, site marked, surgical consent, pre-op evaluation, timeout performed, IV checked, risks and benefits discussed and monitors and equipment checked  Prep:  Pt Position: right lateral decubitus  Sterile barriers:cap, gloves, sterile barriers and mask  Prep: ChloraPrep  Patient monitoring: blood pressure monitoring, continuous pulse oximetry and EKG  Procedure  Performed under: local infiltration  Guidance:ultrasound guided  Images:still images obtained, printed/placed on chart    Laterality:left  Block Type:popliteal  Injection Technique:single-shot  Needle Type:echogenic  Needle Gauge:21 G      Medications Used: ropivacaine (NAROPIN) 0.5 % injection, 30 mL  Med admintered at 9/30/2020 10:19 AM      Post Assessment  Injection Assessment: negative aspiration for heme, no paresthesia on injection and incremental injection  Patient Tolerance:comfortable throughout block  Complications:no

## 2020-09-30 NOTE — ANESTHESIA POSTPROCEDURE EVALUATION
Patient: Ariana Martinez    Procedure Summary     Date: 09/30/20 Room / Location: [facility] OR 09 / [facility] OR    Anesthesia Start: 1010 Anesthesia Stop: 1520    Procedure: subtalar, talonavicular joint arthrodesis.  Removal hardware.          (c-arm) (Left Foot) Diagnosis:       Nonunion of subtalar arthrodesis      Pes cavus      Subtalar varus, acquired, left      (Nonunion of subtalar arthrodesis [M96.0])      (Pes cavus [Q66.70])      (Subtalar varus, acquired, left [M21.6X2])    Surgeon: Ignacio Lord DPM Provider: Maury Macedo MD    Anesthesia Type: general ASA Status: 3          Anesthesia Type: general    Vitals  No vitals data found for the desired time range.          Post Anesthesia Care and Evaluation    Patient location during evaluation: PACU  Patient participation: complete - patient participated  Level of consciousness: awake  Pain score: 0  Pain management: adequate  Airway patency: patent  Anesthetic complications: No anesthetic complications  PONV Status: none  Cardiovascular status: acceptable  Respiratory status: acceptable, spontaneous ventilation and face mask  Hydration status: acceptable

## 2020-09-30 NOTE — ANESTHESIA PREPROCEDURE EVALUATION
Anesthesia Evaluation     Patient summary reviewed and Nursing notes reviewed   no history of anesthetic complications:  NPO Solid Status: > 8 hours  NPO Liquid Status: > 2 hours           Airway   Mallampati: II  TM distance: >3 FB  Neck ROM: full  No difficulty expected  Comment: General anesthesia 10/16.19, [place] #2, ETT 7.5, Grade 1.  Dental    (+) upper dentures and lower dentures    Pulmonary - negative pulmonary ROS    breath sounds clear to auscultation  (+) decreased breath sounds,   (-) COPD, asthma, sleep apnea, not a smoker    ROS comment: snoresFINDINGS: Low lung volumes.  No overt pulmonary vascular congestion, focal pulmonary  parenchymal opacity, pleural effusion or pneumothorax. Cardiac  silhouette is normal in size. Thoracic aorta contains  atherosclerotic calcification. Sternal suture wires appear stable     IMPRESSION:  CONCLUSION:  No acute pulmonary disease.     Electronically signed by:  Vamshi Rose MD  2/16/2020 10:07 AM  CST Workstation: [id number]  Cardiovascular - normal exam  Exercise tolerance: good (4-7 METS)    ECG reviewed  PT is on anticoagulation therapy  Patient on routine beta blocker and Beta blocker given within 24 hours of surgery  Rhythm: regular  Rate: normal    (+) hypertension poorly controlled 2 medications or greater, CAD, CABG >6 Months, cardiac stents more than 12 months ago hyperlipidemia,   (-) valvular problems/murmurs, dysrhythmias, angina, CHF, murmur, PVD, DVT    ROS comment: Rhythm: sinus rhythm  Rate: normal  BPM: 72  ST Segments: ST segments normal  Clinical impression: normal ECG  Interpreted by German      · Left Ventricle: Estimated EF appears to be in the range of 51 - 55%. Normal left ventricular cavity size noted  · There is moderate eccentric hypertrophy of the left ventricular cavity. Left ventricular diastolic dysfunction is noted (grade II w/high LAP) consistent with pseudonormalization.  · Right Ventricle: Normal right ventricular wall  thickness, systolic function and septal motion noted. Right ventricular cavity is borderline dilated  · No evidence of a patent foramen ovale. No evidence of an atrial septal defect present. Saline test results are negative.  · The aortic valve is abnormal with mild calcification of the right coronary cusp.  · Mitral Valve: The mitral valve is abnormal in structure. There is anterior leaflet thickening present. Mild mitral valve regurgitation is present.  · Trace to mild tricuspid valve regurgitation is present. Estimated right ventricular systolic pressure from tricuspid regurgitation is moderately elevated (45-55 mmHg)  · Mild pulmonary hypertension is present.  · There is no evidence of pericardial effusion.   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  The LAD is total from the midportion on.  The only flow given to the LAD region which also would include the apex in the inferoapical portion on a wraparound LAD is in the proximal LAD going into the diagonal branch.  This is been successfully stented with drug-eluting stents x2.  She has flow into the circumflex and right coronary artery and now except for the mid LAD portion is completely revascularized once again.    Neuro/Psych  (+) numbness (both feet), psychiatric history Anxiety and Depression,     (-) seizures, TIA, CVA, headaches  GI/Hepatic/Renal/Endo    (+) morbid obesity, GERD well controlled,  diabetes mellitus (glu 208) type 2 poorly controlled using insulin,   (-) hepatitis, liver disease, no renal disease    Musculoskeletal         ROS comment: Left foot  Abdominal   (+) obese,    Substance History - negative use     OB/GYN negative ob/gyn ROS         Other   arthritis,      (-) history of cancer                    Anesthesia  Plan    ASA 3     general   (Discussed peripheral nerve block(popliteal) for post op pain relief and patient understands possible complications,risks and agrees.)  intravenous induction     Anesthetic plan, all risks, benefits, and alternatives have been provided, discussed and informed consent has been obtained with: patient.

## 2020-10-01 ENCOUNTER — APPOINTMENT (OUTPATIENT)
Dept: INTERVENTIONAL RADIOLOGY/VASCULAR | Facility: HOSPITAL | Age: 60
End: 2020-10-01

## 2020-10-01 LAB
GLUCOSE BLDC GLUCOMTR-MCNC: 289 MG/DL (ref 70–130)
GLUCOSE BLDC GLUCOMTR-MCNC: 293 MG/DL (ref 70–130)
GLUCOSE BLDC GLUCOMTR-MCNC: 301 MG/DL (ref 70–130)
GLUCOSE BLDC GLUCOMTR-MCNC: 323 MG/DL (ref 70–130)
GLUCOSE BLDC GLUCOMTR-MCNC: 358 MG/DL (ref 70–130)

## 2020-10-01 PROCEDURE — 97162 PT EVAL MOD COMPLEX 30 MIN: CPT

## 2020-10-01 PROCEDURE — 82962 GLUCOSE BLOOD TEST: CPT

## 2020-10-01 PROCEDURE — 94799 UNLISTED PULMONARY SVC/PX: CPT

## 2020-10-01 PROCEDURE — 99024 POSTOP FOLLOW-UP VISIT: CPT | Performed by: PODIATRIST

## 2020-10-01 PROCEDURE — 97166 OT EVAL MOD COMPLEX 45 MIN: CPT

## 2020-10-01 PROCEDURE — 63710000001 INSULIN ASPART PER 5 UNITS: Performed by: PODIATRIST

## 2020-10-01 RX ORDER — HYDROCODONE BITARTRATE AND ACETAMINOPHEN 10; 325 MG/1; MG/1
1 TABLET ORAL EVERY 6 HOURS PRN
Status: DISCONTINUED | OUTPATIENT
Start: 2020-10-01 | End: 2020-10-01

## 2020-10-01 RX ORDER — HYDROCODONE BITARTRATE AND ACETAMINOPHEN 7.5; 325 MG/1; MG/1
1 TABLET ORAL EVERY 6 HOURS PRN
Status: DISCONTINUED | OUTPATIENT
Start: 2020-10-01 | End: 2020-10-03 | Stop reason: HOSPADM

## 2020-10-01 RX ADMIN — GABAPENTIN 400 MG: 400 CAPSULE ORAL at 20:00

## 2020-10-01 RX ADMIN — INSULIN ASPART 16 UNITS: 100 INJECTION, SOLUTION INTRAVENOUS; SUBCUTANEOUS at 07:51

## 2020-10-01 RX ADMIN — MIRTAZAPINE 45 MG: 15 TABLET, FILM COATED ORAL at 20:00

## 2020-10-01 RX ADMIN — ASPIRIN 81 MG: 81 TABLET, CHEWABLE ORAL at 08:01

## 2020-10-01 RX ADMIN — METOPROLOL SUCCINATE 25 MG: 25 TABLET, FILM COATED, EXTENDED RELEASE ORAL at 08:01

## 2020-10-01 RX ADMIN — SODIUM CHLORIDE 1000 ML: 900 INJECTION, SOLUTION INTRAVENOUS at 19:58

## 2020-10-01 RX ADMIN — HYDROCHLOROTHIAZIDE 12.5 MG: 12.5 TABLET ORAL at 08:00

## 2020-10-01 RX ADMIN — CLOPIDOGREL BISULFATE 75 MG: 75 TABLET ORAL at 08:01

## 2020-10-01 RX ADMIN — GABAPENTIN 400 MG: 400 CAPSULE ORAL at 08:01

## 2020-10-01 RX ADMIN — ATORVASTATIN CALCIUM 80 MG: 40 TABLET, FILM COATED ORAL at 20:00

## 2020-10-01 RX ADMIN — GABAPENTIN 400 MG: 400 CAPSULE ORAL at 17:13

## 2020-10-01 RX ADMIN — VENLAFAXINE HYDROCHLORIDE 150 MG: 75 CAPSULE, EXTENDED RELEASE ORAL at 07:51

## 2020-10-01 RX ADMIN — LORAZEPAM 0.5 MG: 0.5 TABLET ORAL at 08:01

## 2020-10-01 RX ADMIN — INSULIN ASPART 12 UNITS: 100 INJECTION, SOLUTION INTRAVENOUS; SUBCUTANEOUS at 17:12

## 2020-10-01 RX ADMIN — INSULIN ASPART 20 UNITS: 100 INJECTION, SOLUTION INTRAVENOUS; SUBCUTANEOUS at 11:21

## 2020-10-01 RX ADMIN — FUROSEMIDE 40 MG: 40 TABLET ORAL at 08:01

## 2020-10-01 RX ADMIN — PANTOPRAZOLE SODIUM 20 MG: 20 TABLET, DELAYED RELEASE ORAL at 08:01

## 2020-10-02 LAB — GLUCOSE BLDC GLUCOMTR-MCNC: 289 MG/DL (ref 70–130)

## 2020-10-02 PROCEDURE — 97530 THERAPEUTIC ACTIVITIES: CPT

## 2020-10-02 PROCEDURE — 63710000001 INSULIN ASPART PER 5 UNITS: Performed by: PODIATRIST

## 2020-10-02 PROCEDURE — 82962 GLUCOSE BLOOD TEST: CPT

## 2020-10-02 PROCEDURE — 99024 POSTOP FOLLOW-UP VISIT: CPT | Performed by: PODIATRIST

## 2020-10-02 PROCEDURE — 97110 THERAPEUTIC EXERCISES: CPT

## 2020-10-02 PROCEDURE — 97116 GAIT TRAINING THERAPY: CPT

## 2020-10-02 RX ADMIN — GABAPENTIN 400 MG: 400 CAPSULE ORAL at 16:50

## 2020-10-02 RX ADMIN — INSULIN ASPART 16 UNITS: 100 INJECTION, SOLUTION INTRAVENOUS; SUBCUTANEOUS at 18:20

## 2020-10-02 RX ADMIN — ASPIRIN 81 MG: 81 TABLET, CHEWABLE ORAL at 08:44

## 2020-10-02 RX ADMIN — VENLAFAXINE HYDROCHLORIDE 150 MG: 75 CAPSULE, EXTENDED RELEASE ORAL at 08:44

## 2020-10-02 RX ADMIN — CLOPIDOGREL BISULFATE 75 MG: 75 TABLET ORAL at 08:44

## 2020-10-02 RX ADMIN — PANTOPRAZOLE SODIUM 20 MG: 20 TABLET, DELAYED RELEASE ORAL at 08:44

## 2020-10-02 RX ADMIN — ATORVASTATIN CALCIUM 80 MG: 40 TABLET, FILM COATED ORAL at 20:27

## 2020-10-02 RX ADMIN — METOPROLOL SUCCINATE 25 MG: 25 TABLET, FILM COATED, EXTENDED RELEASE ORAL at 08:44

## 2020-10-02 RX ADMIN — INSULIN ASPART 12 UNITS: 100 INJECTION, SOLUTION INTRAVENOUS; SUBCUTANEOUS at 08:44

## 2020-10-02 RX ADMIN — LORAZEPAM 0.5 MG: 0.5 TABLET ORAL at 08:44

## 2020-10-02 RX ADMIN — HYDROCODONE BITARTRATE AND ACETAMINOPHEN 1 TABLET: 7.5; 325 TABLET ORAL at 10:45

## 2020-10-02 RX ADMIN — INSULIN ASPART 20 UNITS: 100 INJECTION, SOLUTION INTRAVENOUS; SUBCUTANEOUS at 11:50

## 2020-10-02 RX ADMIN — GABAPENTIN 400 MG: 400 CAPSULE ORAL at 08:44

## 2020-10-02 RX ADMIN — MIRTAZAPINE 45 MG: 15 TABLET, FILM COATED ORAL at 20:32

## 2020-10-02 RX ADMIN — FUROSEMIDE 40 MG: 40 TABLET ORAL at 08:44

## 2020-10-02 RX ADMIN — GABAPENTIN 400 MG: 400 CAPSULE ORAL at 20:28

## 2020-10-02 RX ADMIN — HYDROCODONE BITARTRATE AND ACETAMINOPHEN 1 TABLET: 7.5; 325 TABLET ORAL at 19:22

## 2020-10-02 RX ADMIN — HYDROCHLOROTHIAZIDE 12.5 MG: 12.5 TABLET ORAL at 08:44

## 2020-10-03 VITALS
SYSTOLIC BLOOD PRESSURE: 136 MMHG | HEIGHT: 68 IN | HEART RATE: 77 BPM | BODY MASS INDEX: 37.59 KG/M2 | DIASTOLIC BLOOD PRESSURE: 63 MMHG | WEIGHT: 248 LBS | TEMPERATURE: 98.3 F | OXYGEN SATURATION: 94 % | RESPIRATION RATE: 18 BRPM

## 2020-10-03 LAB
GLUCOSE BLDC GLUCOMTR-MCNC: 212 MG/DL (ref 70–130)
GLUCOSE BLDC GLUCOMTR-MCNC: 237 MG/DL (ref 70–130)
GLUCOSE BLDC GLUCOMTR-MCNC: 288 MG/DL (ref 70–130)
GLUCOSE BLDC GLUCOMTR-MCNC: 333 MG/DL (ref 70–130)
GLUCOSE BLDC GLUCOMTR-MCNC: 349 MG/DL (ref 70–130)

## 2020-10-03 PROCEDURE — 99024 POSTOP FOLLOW-UP VISIT: CPT | Performed by: PODIATRIST

## 2020-10-03 PROCEDURE — 63710000001 INSULIN ASPART PER 5 UNITS: Performed by: PODIATRIST

## 2020-10-03 PROCEDURE — 82962 GLUCOSE BLOOD TEST: CPT

## 2020-10-03 PROCEDURE — 97110 THERAPEUTIC EXERCISES: CPT

## 2020-10-03 PROCEDURE — 97116 GAIT TRAINING THERAPY: CPT

## 2020-10-03 RX ORDER — HYDROCODONE BITARTRATE AND ACETAMINOPHEN 10; 325 MG/1; MG/1
1 TABLET ORAL EVERY 6 HOURS PRN
Qty: 40 TABLET | Refills: 0 | Status: SHIPPED | OUTPATIENT
Start: 2020-10-03 | End: 2020-10-09 | Stop reason: SDUPTHER

## 2020-10-03 RX ADMIN — CLOPIDOGREL BISULFATE 75 MG: 75 TABLET ORAL at 08:25

## 2020-10-03 RX ADMIN — FUROSEMIDE 40 MG: 40 TABLET ORAL at 08:24

## 2020-10-03 RX ADMIN — GABAPENTIN 400 MG: 400 CAPSULE ORAL at 08:24

## 2020-10-03 RX ADMIN — HYDROCODONE BITARTRATE AND ACETAMINOPHEN 1 TABLET: 7.5; 325 TABLET ORAL at 08:24

## 2020-10-03 RX ADMIN — VENLAFAXINE HYDROCHLORIDE 150 MG: 75 CAPSULE, EXTENDED RELEASE ORAL at 08:25

## 2020-10-03 RX ADMIN — PANTOPRAZOLE SODIUM 20 MG: 20 TABLET, DELAYED RELEASE ORAL at 08:25

## 2020-10-03 RX ADMIN — ASPIRIN 81 MG: 81 TABLET, CHEWABLE ORAL at 08:24

## 2020-10-03 RX ADMIN — INSULIN ASPART 8 UNITS: 100 INJECTION, SOLUTION INTRAVENOUS; SUBCUTANEOUS at 08:26

## 2020-10-03 RX ADMIN — HYDROCHLOROTHIAZIDE 12.5 MG: 12.5 TABLET ORAL at 08:24

## 2020-10-03 RX ADMIN — METOPROLOL SUCCINATE 25 MG: 25 TABLET, FILM COATED, EXTENDED RELEASE ORAL at 08:25

## 2020-10-05 ENCOUNTER — OFFICE VISIT (OUTPATIENT)
Dept: PODIATRY | Facility: CLINIC | Age: 60
End: 2020-10-05

## 2020-10-05 VITALS — HEART RATE: 87 BPM | HEIGHT: 68 IN | BODY MASS INDEX: 37.59 KG/M2 | WEIGHT: 248 LBS | OXYGEN SATURATION: 98 %

## 2020-10-05 DIAGNOSIS — M21.172 SUBTALAR VARUS, ACQUIRED, LEFT: ICD-10-CM

## 2020-10-05 DIAGNOSIS — Q66.70 PES CAVUS: ICD-10-CM

## 2020-10-05 DIAGNOSIS — M96.0 NONUNION OF SUBTALAR ARTHRODESIS: Primary | ICD-10-CM

## 2020-10-05 PROCEDURE — 29405 APPL SHORT LEG CAST: CPT | Performed by: PODIATRIST

## 2020-10-05 PROCEDURE — 99024 POSTOP FOLLOW-UP VISIT: CPT | Performed by: PODIATRIST

## 2020-10-05 NOTE — PROGRESS NOTES
Ariana Martinez  1960  60 y.o. female   PCP: Kimberly Ferguson 05/26/2020  BS - 128 per pt.    Patient presents today for post op appointment of left foot states her pain is 4/10  10/05/2020      Chief Complaint   Patient presents with   • Left Foot - Post-op       History of Present Illness    Ms Martinez is a 59 y/o diabetic female who presents for follow-up of left subtalar joint and talonavicular joint arthrodesis.  Date of surgery 9/30/2020.  She is doing well overall with improving postoperative pain.  She is remained nonweightbearing.    Past Medical History:   Diagnosis Date   • Angina, class IV (CMS/[facility])    • Anxiety    • Anxiety and depression    • Benign paroxysmal positional vertigo    • Bladder disorder     has bladder stimulator   • Carpal tunnel syndrome    • Chronic pain    • Coronary atherosclerosis     hx CABG 2005.  is followed by Dr Kwon   • Depression    • Diabetes mellitus (CMS/HCC)     Type 2, controlled   • Diabetic polyneuropathy (CMS/[facility])    • Disease of thyroid gland    • Elevated cholesterol    • Female stress incontinence    • Foot pain, left    • Full dentures    • Gastroparesis    • GERD (gastroesophageal reflux disease)    • Hyperlipidemia    • Hypertension    • Low back pain    • Malaise and fatigue    • Multiple joint pain    • Obesity     Refuses to be weighed   • Otalgia     Both   • Perforation of tympanic membrane     Left   • Postoperative wound infection    • Vitamin D deficiency    • Wears glasses     reading         Past Surgical History:   Procedure Laterality Date   • ABDOMINAL SURGERY     • ANGIOPLASTY      coronary   • BREAST BIOPSY Right    • CARDIAC CATHETERIZATION     • CARDIAC CATHETERIZATION N/A 6/20/2017    Procedure: Right Heart Cath;  Surgeon: Can Kwon MD PhD;  Location: [facility] INVASIVE LOCATION;  Service:    • CARDIAC CATHETERIZATION N/A 2/18/2020    Procedure: Left Heart Cath;  Surgeon: Catalina Cooper MD;  Location: [facility]  INVASIVE LOCATION;  Service: Cardiology;  Laterality: N/A;   • CARPAL TUNNEL RELEASE     • CHOLECYSTECTOMY     • COLONOSCOPY N/A 6/24/2020    Procedure: COLONOSCOPY;  Surgeon: Jluián Maldonado MD;  Location: [facility] ENDOSCOPY;  Service: Gastroenterology;  Laterality: N/A;   • CORONARY ARTERY BYPASS GRAFT  2005   • ENDOSCOPY N/A 10/19/2018    Procedure: ESOPHAGOGASTRODUODENOSCOPY possible dilation;  Surgeon: Julián Maldonado MD;  Location: [facility] ENDOSCOPY;  Service: Gastroenterology   • ENDOSCOPY N/A 6/24/2020    Procedure: ESOPHAGOGASTRODUODENOSCOPY WED appt please;  Surgeon: Julián Maldonado MD;  Location: [facility] ENDOSCOPY;  Service: Gastroenterology;  Laterality: N/A;   • ENDOSCOPY AND COLONOSCOPY     • FOOT SURGERY      Toes   • FOOT SURGERY     • GASTRIC BANDING      Revision, laparoscopic   • HYSTERECTOMY     • MOUTH SURGERY     • SALPINGO OOPHORECTOMY     • SHOULDER SURGERY     • SUBTALAR ARTHRODESIS Left 1/16/2019    Procedure: LEFT FOOT HARDWARE REMOVAL, FIFTH METATARSAL , OPEN REDUCTION INTERNAL FIXATION, CALCANEAL OSTEOTOMY;  Surgeon: Ignacio Lord DPM;  Location: [facility] OR;  Service: Podiatry   • SUBTALAR ARTHRODESIS Left 10/16/2019    Procedure: foot hardware removal, subtalar joint fusion  possible de/reattachment of achilles tendon        (c-arm);  Surgeon: Ignacio Lord DPM;  Location: [facility] OR;  Service: Podiatry   • TRANSESOPHAGEAL ECHOCARDIOGRAM (LAMONTE)      With color flow         Family History   Problem Relation Age of Onset   • Diabetes Other    • Heart disease Other    • Hypertension Other    • Heart disease Mother    • Stroke Mother    • Hypertension Mother    • Diabetes Sister    • Heart disease Sister    • Hypertension Sister    • Heart disease Sister    • Diabetes Sister    • Hypertension Sister    • Diabetes Sister    • Diabetes Sister    • Diabetes Sister    • Diabetes Sister          Social History     Socioeconomic History   • Marital status:      Spouse name: Not on  file   • Number of children: Not on file   • Years of education: Not on file   • Highest education level: Not on file   Tobacco Use   • Smoking status: Never Smoker   • Smokeless tobacco: Never Used   Substance and Sexual Activity   • Alcohol use: No   • Drug use: No   • Sexual activity: Defer         Current Outpatient Medications   Medication Sig Dispense Refill   • ARIPiprazole (ABILIFY) 15 MG tablet Take 1 tablet by mouth Daily. 90 tablet 1   • aspirin 81 MG chewable tablet Chew 81 mg daily.     • atorvastatin (LIPITOR) 80 MG tablet TAKE 1 TABLET BY MOUTH EVERY DAY (Patient taking differently: Take 80 mg by mouth Every Night.) 90 tablet 1   • BD SHARPS CONTAINER HOME misc 1 each Take As Directed. 1 each 0   • Blood Glucose Monitoring Suppl (ONE TOUCH ULTRA MINI) w/Device kit USE AS DIRECTED TO CHECK BLOOD SUGAR 1 each 0   • Calcium Citrate-Vitamin D (CITRACAL/VITAMIN D) 250-200 MG-UNIT tablet Take 2 tablets by mouth 2 (two) times a day.     • clopidogrel (PLAVIX) 75 MG tablet TAKE 1 TABLET BY MOUTH EVERY DAY (Patient taking differently: Take 75 mg by mouth Daily.) 90 tablet 1   • cyanocobalamin 1000 MCG/ML injection INJECT 1 ML INTO THE APPROPRIATE MUSCLE AS DIRECTED BY PRESCRIBER EVERY 28 (TWENTY-EIGHT) DAYS. 3 mL 0   • furosemide (LASIX) 40 MG tablet TAKE 1 TABLET BY MOUTH EVERY DAY (Patient taking differently: Take 40 mg by mouth Daily.) 90 tablet 1   • gabapentin (NEURONTIN) 400 MG capsule Take 1 capsule by mouth 3 (Three) Times a Day. 90 capsule 2   • GLUCAGON EMERGENCY 1 MG injection USE AS DIRECTED AS NEEDED 1 kit 0   • glucose blood test strip Use to check blood sugar 4 times daily. accucheck 125 each 12   • glucose monitor monitoring kit 1 each Daily. accucheck eve meter, E11.9 1 each 0   • hydroCHLOROthiazide (HYDRODIURIL) 12.5 MG tablet Take 12.5 mg by mouth Daily.     • HYDROcodone-acetaminophen (NORCO)  MG per tablet Take 1 tablet by mouth Every 6 (Six) Hours As Needed for Moderate Pain . 40  "tablet 0   • Insulin Glargine (BASAGLAR KWIKPEN) 100 UNIT/ML injection pen Inject 100 units under skin in the the appropriate area as directed every night.  (Patient taking differently: 60 Units Every Night.) 10 pen 2   • Insulin Pen Needle (B-D ULTRAFINE III SHORT PEN) 31G X 8 MM misc USE TO INJECT 4 TIMES A  each 11   • LORazepam (ATIVAN) 0.5 MG tablet Take 1 tablet by mouth Daily As Needed for Anxiety. (Patient taking differently: Take 0.5 mg by mouth Daily.) 30 tablet 0   • meclizine (ANTIVERT) 25 MG tablet Take 1 tablet by mouth 3 (Three) Times a Day As Needed for dizziness. 90 tablet 6   • metoclopramide (REGLAN) 10 MG tablet TAKE 1 TABLET BY MOUTH 4 (FOUR) TIMES A DAY AS NEEDED (GASTROPARESIS). 120 tablet 1   • metoprolol succinate XL (TOPROL-XL) 25 MG 24 hr tablet TAKE 1 TABLET BY MOUTH EVERY DAY (Patient taking differently: Take 25 mg by mouth Daily.) 31 tablet 6   • mirtazapine (REMERON) 45 MG tablet TAKE 1 TABLET BY MOUTH EVERY NIGHT. 90 tablet 0   • NOVOLOG FLEXPEN 100 UNIT/ML solution pen-injector sc pen INJECT 60 UNITS BEFORE MEALS 3 TIMES A DAY (Patient taking differently: Inject 60 Units under the skin into the appropriate area as directed 3 (Three) Times a Day With Meals.) 162 mL 3   • ondansetron (ZOFRAN) 8 MG tablet TAKE 1 TABLET BY MOUTH EVERY 8 (EIGHT) HOURS AS NEEDED FOR NAUSEA OR VOMITING. 18 tablet 1   • pantoprazole (PROTONIX) 20 MG EC tablet Take 1 tablet by mouth Daily. 90 tablet 3   • Syringe/Needle, Disp, (Luer Lock Safety Syringes) 25G X 5/8\" 3 ML misc 1 each Daily. 1 Q28 DAYS 1 each 2   • venlafaxine XR (EFFEXOR-XR) 150 MG 24 hr capsule TAKE 1 CAPSULE BY MOUTH TWICE A DAY (Patient taking differently: Take 150 mg by mouth.) 90 capsule 1   • vitamin D (ERGOCALCIFEROL) 1.25 MG (27205 UT) capsule capsule TAKE ONE CAPSULE BY MOUTH EVERY SUNDAY. (Patient taking differently: Take 50,000 Units by mouth Every 7 (Seven) Days.) 12 capsule 2     No current facility-administered medications " "for this visit.      Review of Systems   Constitutional: Negative.    HENT: Negative.    Eyes: Negative.    Respiratory: Negative.    Cardiovascular: Negative.    Gastrointestinal: Negative.    Endocrine: Negative.    Genitourinary: Negative.    Musculoskeletal: Negative.         Left foot pain    Skin: Negative.    Allergic/Immunologic: Negative.    Neurological: Positive for numbness.   Hematological: Negative.    Psychiatric/Behavioral: Negative.          OBJECTIVE    Pulse 87   Ht 172.7 cm (68\")   Wt 112 kg (248 lb)   SpO2 98%   BMI 37.71 kg/m²       Physical Exam   Constitutional: she appears well-developed and well-nourished.   HEENT: Normocephalic. Atraumatic.  CV: No CP. RRR  Resp: Non-labored respirations.  Psychiatric: she has a normal mood and affect. her behavior is normal.           Lower Extremity Exam:  Pulses palpable.  Capillary fill time within normal limits.  Mild to moderate left foot and ankle edema.  No ecchymosis or erythema.  Left foot incisions coapted with sutures in place no signs of infection.  Subtalar, talonavicular joint rectus, rigid      Procedures         ASSESSMENT AND PLAN    Ariana was seen today for post-op.    Diagnoses and all orders for this visit:    Nonunion of subtalar arthrodesis    Pes cavus    Subtalar varus, acquired, left        -Patient doing well postoperatively  -Dressing change and well-padded below-knee fiberglass cast applied today.  -Continue strict nonweightbearing  -Recheck next week, suture removal            This document has been electronically signed by Ignacio Lord DPM on October 5, 2020 17:40 CDT       "

## 2020-10-06 LAB
LAB AP CASE REPORT: NORMAL
PATH REPORT.FINAL DX SPEC: NORMAL

## 2020-10-09 ENCOUNTER — OFFICE VISIT (OUTPATIENT)
Dept: FAMILY MEDICINE CLINIC | Facility: CLINIC | Age: 60
End: 2020-10-09

## 2020-10-09 VITALS — WEIGHT: 247 LBS | HEIGHT: 68 IN | BODY MASS INDEX: 37.44 KG/M2

## 2020-10-09 DIAGNOSIS — E11.42 DIABETIC POLYNEUROPATHY ASSOCIATED WITH TYPE 2 DIABETES MELLITUS (HCC): ICD-10-CM

## 2020-10-09 DIAGNOSIS — F41.1 GENERALIZED ANXIETY DISORDER: ICD-10-CM

## 2020-10-09 DIAGNOSIS — M96.0 NONUNION OF SUBTALAR ARTHRODESIS: ICD-10-CM

## 2020-10-09 DIAGNOSIS — M21.172 HINDFOOT VARUS, ACQUIRED, LEFT: ICD-10-CM

## 2020-10-09 DIAGNOSIS — G89.29 CHRONIC RIGHT-SIDED LOW BACK PAIN WITH RIGHT-SIDED SCIATICA: ICD-10-CM

## 2020-10-09 DIAGNOSIS — Z09 HOSPITAL DISCHARGE FOLLOW-UP: Primary | ICD-10-CM

## 2020-10-09 DIAGNOSIS — M54.41 CHRONIC RIGHT-SIDED LOW BACK PAIN WITH RIGHT-SIDED SCIATICA: ICD-10-CM

## 2020-10-09 PROCEDURE — 99442 PR PHYS/QHP TELEPHONE EVALUATION 11-20 MIN: CPT | Performed by: NURSE PRACTITIONER

## 2020-10-09 RX ORDER — HYDROCODONE BITARTRATE AND ACETAMINOPHEN 10; 325 MG/1; MG/1
1 TABLET ORAL EVERY 6 HOURS PRN
Qty: 120 TABLET | Refills: 0 | Status: SHIPPED | OUTPATIENT
Start: 2020-10-09 | End: 2020-11-06 | Stop reason: SDUPTHER

## 2020-10-09 RX ORDER — LORAZEPAM 0.5 MG/1
0.5 TABLET ORAL DAILY PRN
Qty: 30 TABLET | Refills: 0 | Status: SHIPPED | OUTPATIENT
Start: 2020-10-09 | End: 2020-11-12 | Stop reason: SDUPTHER

## 2020-10-09 NOTE — PROGRESS NOTES
Chief Complaint   Patient presents with   • Follow-up     hospital from 9/30 for foot surgery LT     Subjective   Ariana Martinez is a 60 y.o. female who presents to the office by telephone visit due to pandemic for follow up of recent hospitalization for foot surgery.     The following portions of the patient's history were reviewed and updated as appropriate: allergies, current medications, past family history, past medical history, past social history, past surgical history and problem list.    History of Present Illness   You have chosen to receive care through a telephone visit. Do you consent to use a telephone visit for your medical care today? Yes  18 minutes medical discussion.   Chronic lower back pain due to DDD lumbar spine with sciatica: managed with hydrocodone and gabapentin. Will be due for hydrocodone refill on Monday 10/12/20 so refill sent. Not yet due for refill gabapentin.   Admitted 9/30/20 and discharged 10/3/20 after an extensive surgery of left foot. Was issued a 10 day prescription for her current pain medication at that time. Hospital records are reviewed.   On 9/30/20 was admitted to Dr Ignacio Lord, and underwent left subtalar and talonavicular joint revisional arthrodesis for pain control and PT/OT evaluation.  Her hospital course was uneventful save some mild sedation on postop days 1-2 which corrected before discharge. Today she reports she has less foot pain than with any previous foot surgery and she has had several. She is quite satisfied with her early postop progress.    She has no new complaints or needs today.  Anxiety: managed well with current medications. Due for refill lorazepam on Monday, will send to pharmacy today. Compliant with med use and denies oversedation.        Past Medical History:   Diagnosis Date   • Angina, class IV (CMS/HCC)    • Anxiety    • Anxiety and depression    • Benign paroxysmal positional vertigo    • Bladder disorder     has bladder  stimulator   • Carpal tunnel syndrome    • Chronic pain    • Coronary atherosclerosis     hx CABG 2005.  is followed by Dr Kwon   • Depression    • Diabetes mellitus (CMS/HCC)     Type 2, controlled   • Diabetic polyneuropathy (CMS/HCC)    • Disease of thyroid gland    • Elevated cholesterol    • Female stress incontinence    • Foot pain, left    • Full dentures    • Gastroparesis    • GERD (gastroesophageal reflux disease)    • Hyperlipidemia    • Hypertension    • Low back pain    • Malaise and fatigue    • Multiple joint pain    • Obesity     Refuses to be weighed   • Otalgia     Both   • Perforation of tympanic membrane     Left   • Postoperative wound infection    • Vitamin D deficiency    • Wears glasses     reading          Family History   Problem Relation Age of Onset   • Diabetes Other    • Heart disease Other    • Hypertension Other    • Heart disease Mother    • Stroke Mother    • Hypertension Mother    • Diabetes Sister    • Heart disease Sister    • Hypertension Sister    • Heart disease Sister    • Diabetes Sister    • Hypertension Sister    • Diabetes Sister    • Diabetes Sister    • Diabetes Sister    • Diabetes Sister         Review of Systems   Constitutional: Negative.  Negative for fever and unexpected weight change.   HENT: Negative.    Eyes: Negative.    Respiratory: Negative.  Negative for cough, chest tightness and shortness of breath.    Cardiovascular: Negative.  Negative for chest pain.   Gastrointestinal: Negative.    Endocrine: Negative.    Genitourinary: Negative.  Negative for dysuria.   Musculoskeletal: Positive for arthralgias, back pain and gait problem.   Skin: Negative.  Negative for color change, pallor, rash and wound.   Allergic/Immunologic: Negative.    Hematological: Negative.    Psychiatric/Behavioral: Negative.  Negative for sleep disturbance and suicidal ideas.   All other systems reviewed and are negative.      Objective   Vitals:    10/09/20 1603   Weight: 112 kg  "(247 lb)   Height: 172.7 cm (68\")   PainSc:   7   PainLoc: Foot  Comment: LT     Physical Exam  Constitutional:       General: She is not in acute distress.  Pulmonary:      Effort: Pulmonary effort is normal. No respiratory distress.      Breath sounds: No stridor.   Neurological:      Mental Status: She is oriented to person, place, and time.   Psychiatric:         Behavior: Behavior normal.         Thought Content: Thought content normal.         Judgment: Judgment normal.         Assessment/Plan   Ariana was seen today for follow-up.    Diagnoses and all orders for this visit:    Hospital discharge follow-up    Nonunion of subtalar arthrodesis  -     HYDROcodone-acetaminophen (NORCO)  MG per tablet; Take 1 tablet by mouth Every 6 (Six) Hours As Needed for Moderate Pain .    Hindfoot varus, acquired, left  -     HYDROcodone-acetaminophen (NORCO)  MG per tablet; Take 1 tablet by mouth Every 6 (Six) Hours As Needed for Moderate Pain .    Generalized anxiety disorder  -     LORazepam (ATIVAN) 0.5 MG tablet; Take 1 tablet by mouth Daily As Needed for Anxiety.    Chronic right-sided low back pain with right-sided sciatica    Diabetic polyneuropathy associated with type 2 diabetes mellitus (CMS/HCC)         Stable chronic left foot pain, lower back pain/sciatica. Hydrocodone refill sent.  Stable anxiety, refill lorazepam sent.   PHQ-2/PHQ-9 Depression Screening 10/9/2020   Little interest or pleasure in doing things 0   Feeling down, depressed, or hopeless 0   Trouble falling or staying asleep, or sleeping too much 0   Feeling tired or having little energy 0   Poor appetite or overeating 0   Feeling bad about yourself - or that you are a failure or have let yourself or your family down 0   Trouble concentrating on things, such as reading the newspaper or watching television 0   Moving or speaking so slowly that other people could have noticed. Or the opposite - being so fidgety or restless that you have " been moving around a lot more than usual 0   Thoughts that you would be better off dead, or of hurting yourself in some way 0   Total Score 0   If you checked off any problems, how difficult have these problems made it for you to do your work, take care of things at home, or get along with other people? -   Patient has insomnia requiring benzodiazepine use concurrently with chronic pain requiring opiate pain medication and/ or gabapentin. Patient has been education regarding potential dangers of oversedation and accidental overdose with use of both medications concurrently. Serial assessments of patient has yielded no sign of oversedation or adverse effects of patient, and he/she is advised and aware to notify my office immediately if symptoms do occur, as well as to discontinue use of benzodiazepine medication and opiate medication immediately if that occurs, pending medical evaluation and advice. Patient has been compliant with use of medications, UDS, visits with no adverse effects noted. Patient understands the risks associated with this controlled medication, including tolerance and addiction.  Patient also agrees to only obtain this medication from me, and not from a another provider, unless that provider is covering for me in my absence.  Patient also agrees to be compliant in dosing, and not self adjust the dose of medication.  A signed controlled substance agreement is on file, and the patient has received a controlled substance education sheet at this a previous visit.  The patient has also signed a consent for treatment with a controlled substance as per [facility] policy. LESTER was obtained.      BEBE Palomino         Return in about 12 weeks (around 1/1/2021).    There are no Patient Instructions on file for this visit.

## 2020-10-12 ENCOUNTER — OFFICE VISIT (OUTPATIENT)
Dept: PODIATRY | Facility: CLINIC | Age: 60
End: 2020-10-12

## 2020-10-12 VITALS — BODY MASS INDEX: 37.44 KG/M2 | OXYGEN SATURATION: 98 % | WEIGHT: 247 LBS | HEART RATE: 82 BPM | HEIGHT: 68 IN

## 2020-10-12 DIAGNOSIS — Q66.70 PES CAVUS: ICD-10-CM

## 2020-10-12 DIAGNOSIS — M21.172 SUBTALAR VARUS, ACQUIRED, LEFT: ICD-10-CM

## 2020-10-12 DIAGNOSIS — M96.0 NONUNION OF SUBTALAR ARTHRODESIS: Primary | ICD-10-CM

## 2020-10-12 PROCEDURE — 29405 APPL SHORT LEG CAST: CPT | Performed by: PODIATRIST

## 2020-10-12 PROCEDURE — 99024 POSTOP FOLLOW-UP VISIT: CPT | Performed by: PODIATRIST

## 2020-10-12 NOTE — PROGRESS NOTES
Ariana Luis Martinez  1960  60 y.o. female   PCP: Kimberly Ferguson 10/9/2020  BS - 138 per pt.    Patient presents today for post op appointment of left foot states her pain is 7/10 . Cast change and xray obtained today   10/12/2020        Chief Complaint   Patient presents with   • Left Foot - Post-op       History of Present Illness    Ms Martinez is a 61 y/o diabetic female who presents for follow-up of left subtalar joint and talonavicular joint arthrodesis.  Date of surgery 9/30/2020.  She is doing well overall with improving postoperative pain.  She has remained nonweightbearing.    Past Medical History:   Diagnosis Date   • Angina, class IV (CMS/HCC)    • Anxiety    • Anxiety and depression    • Benign paroxysmal positional vertigo    • Bladder disorder     has bladder stimulator   • Carpal tunnel syndrome    • Chronic pain    • Coronary atherosclerosis     hx CABG 2005.  is followed by Dr Kwon   • Depression    • Diabetes mellitus (CMS/HCC)     Type 2, controlled   • Diabetic polyneuropathy (CMS/HCC)    • Disease of thyroid gland    • Elevated cholesterol    • Female stress incontinence    • Foot pain, left    • Full dentures    • Gastroparesis    • GERD (gastroesophageal reflux disease)    • Hyperlipidemia    • Hypertension    • Low back pain    • Malaise and fatigue    • Multiple joint pain    • Obesity     Refuses to be weighed   • Otalgia     Both   • Perforation of tympanic membrane     Left   • Postoperative wound infection    • Vitamin D deficiency    • Wears glasses     reading         Past Surgical History:   Procedure Laterality Date   • ABDOMINAL SURGERY     • ANGIOPLASTY      coronary   • BREAST BIOPSY Right    • CARDIAC CATHETERIZATION     • CARDIAC CATHETERIZATION N/A 6/20/2017    Procedure: Right Heart Cath;  Surgeon: Can Kwon MD PhD;  [facility];  Service:    • CARDIAC CATHETERIZATION N/A 2/18/2020    Procedure: Left Heart Cath;  Surgeon: Kenneth  Catalina Robles MD;  Location: [facility] CATH INVASIVE LOCATION;  Service: Cardiology;  Laterality: N/A;   • CARPAL TUNNEL RELEASE     • CHOLECYSTECTOMY     • COLONOSCOPY N/A 6/24/2020    Procedure: COLONOSCOPY;  Surgeon: Julián Maldonado MD;  Location: [facility] ENDOSCOPY;  Service: Gastroenterology;  Laterality: N/A;   • CORONARY ARTERY BYPASS GRAFT  2005   • ENDOSCOPY N/A 10/19/2018    Procedure: ESOPHAGOGASTRODUODENOSCOPY possible dilation;  Surgeon: Julián Maldonado MD;  Location: [facility] ENDOSCOPY;  Service: Gastroenterology   • ENDOSCOPY N/A 6/24/2020    Procedure: ESOPHAGOGASTRODUODENOSCOPY WED appt please;  Surgeon: Julián Maldonado MD;  Location: [facility] ENDOSCOPY;  Service: Gastroenterology;  Laterality: N/A;   • ENDOSCOPY AND COLONOSCOPY     • FOOT SURGERY      Toes   • FOOT SURGERY     • GASTRIC BANDING      Revision, laparoscopic   • HYSTERECTOMY     • MOUTH SURGERY     • SALPINGO OOPHORECTOMY     • SHOULDER SURGERY     • SUBTALAR ARTHRODESIS Left 1/16/2019    Procedure: LEFT FOOT HARDWARE REMOVAL, FIFTH METATARSAL , OPEN REDUCTION INTERNAL FIXATION, CALCANEAL OSTEOTOMY;  Surgeon: Ignacio Lord DPM;  Location: [facility] OR;  Service: Podiatry   • SUBTALAR ARTHRODESIS Left 10/16/2019    Procedure: foot hardware removal, subtalar joint fusion  possible de/reattachment of achilles tendon        (c-arm);  Surgeon: Ignacio Lord DPM;  Location: [facility] OR;  Service: Podiatry   • SUBTALAR ARTHRODESIS Left 9/30/2020    Procedure: subtalar, talonavicular joint arthrodesis.  Removal hardware.          (c-arm);  Surgeon: Ignacio Lord DPM;  Location: [facility] OR;  Service: Podiatry;  Laterality: Left;   • TRANSESOPHAGEAL ECHOCARDIOGRAM (LAMONTE)      With color flow         Family History   Problem Relation Age of Onset   • Diabetes Other    • Heart disease Other    • Hypertension Other    • Heart disease Mother    • Stroke Mother    • Hypertension Mother    • Diabetes Sister    • Heart disease Sister    •  Hypertension Sister    • Heart disease Sister    • Diabetes Sister    • Hypertension Sister    • Diabetes Sister    • Diabetes Sister    • Diabetes Sister    • Diabetes Sister          Social History     Socioeconomic History   • Marital status:      Spouse name: Not on file   • Number of children: Not on file   • Years of education: Not on file   • Highest education level: Not on file   Tobacco Use   • Smoking status: Never Smoker   • Smokeless tobacco: Never Used   Substance and Sexual Activity   • Alcohol use: No   • Drug use: No   • Sexual activity: Defer         Current Outpatient Medications   Medication Sig Dispense Refill   • ARIPiprazole (ABILIFY) 15 MG tablet Take 1 tablet by mouth Daily. 90 tablet 1   • aspirin 81 MG chewable tablet Chew 81 mg daily.     • atorvastatin (LIPITOR) 80 MG tablet TAKE 1 TABLET BY MOUTH EVERY DAY (Patient taking differently: Take 80 mg by mouth Every Night.) 90 tablet 1   • BD SHARPS CONTAINER HOME misc 1 each Take As Directed. 1 each 0   • Blood Glucose Monitoring Suppl (ONE TOUCH ULTRA MINI) w/Device kit USE AS DIRECTED TO CHECK BLOOD SUGAR 1 each 0   • Calcium Citrate-Vitamin D (CITRACAL/VITAMIN D) 250-200 MG-UNIT tablet Take 2 tablets by mouth 2 (two) times a day.     • clopidogrel (PLAVIX) 75 MG tablet TAKE 1 TABLET BY MOUTH EVERY DAY (Patient taking differently: Take 75 mg by mouth Daily.) 90 tablet 1   • cyanocobalamin 1000 MCG/ML injection INJECT 1 ML INTO THE APPROPRIATE MUSCLE AS DIRECTED BY PRESCRIBER EVERY 28 (TWENTY-EIGHT) DAYS. 3 mL 0   • furosemide (LASIX) 40 MG tablet TAKE 1 TABLET BY MOUTH EVERY DAY (Patient taking differently: Take 40 mg by mouth Daily.) 90 tablet 1   • gabapentin (NEURONTIN) 400 MG capsule Take 1 capsule by mouth 3 (Three) Times a Day. 90 capsule 2   • GLUCAGON EMERGENCY 1 MG injection USE AS DIRECTED AS NEEDED 1 kit 0   • glucose blood test strip Use to check blood sugar 4 times daily. accucheck 125 each 12   • glucose monitor  "monitoring kit 1 each Daily. accucheck eve meter, E11.9 1 each 0   • hydroCHLOROthiazide (HYDRODIURIL) 12.5 MG tablet Take 12.5 mg by mouth Daily.     • HYDROcodone-acetaminophen (NORCO)  MG per tablet Take 1 tablet by mouth Every 6 (Six) Hours As Needed for Moderate Pain . 120 tablet 0   • Insulin Glargine (BASAGLAR KWIKPEN) 100 UNIT/ML injection pen Inject 100 units under skin in the the appropriate area as directed every night.  (Patient taking differently: 60 Units Every Night.) 10 pen 2   • Insulin Pen Needle (B-D ULTRAFINE III SHORT PEN) 31G X 8 MM misc USE TO INJECT 4 TIMES A  each 11   • LORazepam (ATIVAN) 0.5 MG tablet Take 1 tablet by mouth Daily As Needed for Anxiety. 30 tablet 0   • meclizine (ANTIVERT) 25 MG tablet Take 1 tablet by mouth 3 (Three) Times a Day As Needed for dizziness. 90 tablet 6   • metoclopramide (REGLAN) 10 MG tablet TAKE 1 TABLET BY MOUTH 4 (FOUR) TIMES A DAY AS NEEDED (GASTROPARESIS). 120 tablet 1   • metoprolol succinate XL (TOPROL-XL) 25 MG 24 hr tablet TAKE 1 TABLET BY MOUTH EVERY DAY (Patient taking differently: Take 25 mg by mouth Daily.) 31 tablet 6   • mirtazapine (REMERON) 45 MG tablet TAKE 1 TABLET BY MOUTH EVERY NIGHT. 90 tablet 0   • NOVOLOG FLEXPEN 100 UNIT/ML solution pen-injector sc pen INJECT 60 UNITS BEFORE MEALS 3 TIMES A DAY (Patient taking differently: Inject 60 Units under the skin into the appropriate area as directed 3 (Three) Times a Day With Meals.) 162 mL 3   • ondansetron (ZOFRAN) 8 MG tablet TAKE 1 TABLET BY MOUTH EVERY 8 (EIGHT) HOURS AS NEEDED FOR NAUSEA OR VOMITING. 18 tablet 1   • pantoprazole (PROTONIX) 20 MG EC tablet Take 1 tablet by mouth Daily. 90 tablet 3   • Syringe/Needle, Disp, (Luer Lock Safety Syringes) 25G X 5/8\" 3 ML misc 1 each Daily. 1 Q28 DAYS 1 each 2   • venlafaxine XR (EFFEXOR-XR) 150 MG 24 hr capsule TAKE 1 CAPSULE BY MOUTH TWICE A DAY (Patient taking differently: Take 150 mg by mouth.) 90 capsule 1   • vitamin D " "(ERGOCALCIFEROL) 1.25 MG (38078 UT) capsule capsule TAKE ONE CAPSULE BY MOUTH EVERY SUNDAY. (Patient taking differently: Take 50,000 Units by mouth Every 7 (Seven) Days.) 12 capsule 2     No current facility-administered medications for this visit.      Review of Systems   Constitutional: Negative.    HENT: Negative.    Eyes: Negative.    Respiratory: Negative.    Cardiovascular: Negative.    Gastrointestinal: Negative.    Endocrine: Negative.    Genitourinary: Negative.    Musculoskeletal: Negative.         Left foot pain    Skin: Negative.    Allergic/Immunologic: Negative.    Neurological: Positive for numbness.   Hematological: Negative.    Psychiatric/Behavioral: Negative.          OBJECTIVE    Pulse 82   Ht 172.7 cm (68\")   Wt 112 kg (247 lb)   SpO2 98%   BMI 37.56 kg/m²       Physical Exam   Constitutional: she appears well-developed and well-nourished.   HEENT: Normocephalic. Atraumatic.  CV: No CP. RRR  Resp: Non-labored respirations.  Psychiatric: she has a normal mood and affect. her behavior is normal.           Lower Extremity Exam:  Pulses palpable.  Capillary fill time within normal limits.  Mild to moderate left foot and ankle edema.  No ecchymosis or erythema.  Left foot incisions coapted with sutures in place no signs of infection.  Subtalar, talonavicular joint rectus, rigid      Procedures         ASSESSMENT AND PLAN    Ariana was seen today for post-op.    Diagnoses and all orders for this visit:    Nonunion of subtalar arthrodesis  -     XR Foot 3+ View Left    Pes cavus    Subtalar varus, acquired, left        -Patient doing well postoperatively  -Xavier sutures removed.  Some suture maintained at medial ankle.  -Dressing change and well-padded below-knee fiberglass cast applied today.  -Continue strict nonweightbearing  -Recheck 2 weeks, removal of remaining sutures            This document has been electronically signed by Ignacio Lord DPM on October 12, 2020 15:31 CDT       "

## 2020-10-19 RX ORDER — BLOOD SUGAR DIAGNOSTIC
STRIP MISCELLANEOUS
Qty: 120 EACH | Refills: 3 | Status: SHIPPED | OUTPATIENT
Start: 2020-10-19 | End: 2021-01-13

## 2020-10-26 ENCOUNTER — OFFICE VISIT (OUTPATIENT)
Dept: PODIATRY | Facility: CLINIC | Age: 60
End: 2020-10-26

## 2020-10-26 VITALS — WEIGHT: 247 LBS | HEART RATE: 78 BPM | HEIGHT: 68 IN | OXYGEN SATURATION: 99 % | BODY MASS INDEX: 37.44 KG/M2

## 2020-10-26 DIAGNOSIS — M96.0 NONUNION OF SUBTALAR ARTHRODESIS: Primary | ICD-10-CM

## 2020-10-26 DIAGNOSIS — Q66.70 PES CAVUS: ICD-10-CM

## 2020-10-26 DIAGNOSIS — M21.172 SUBTALAR VARUS, ACQUIRED, LEFT: ICD-10-CM

## 2020-10-26 PROCEDURE — 99024 POSTOP FOLLOW-UP VISIT: CPT | Performed by: PODIATRIST

## 2020-10-26 PROCEDURE — 29405 APPL SHORT LEG CAST: CPT | Performed by: PODIATRIST

## 2020-10-26 NOTE — PROGRESS NOTES
Ariana Luis Martinez  1960  60 y.o. female   PCP: Kimberly Ferguson 10/9/2020  BS - 147 per pt.    Patient presents today for post op appointment of left foot states her pain is 5/10 . Cast change and xray obtained today     10/26/2020      Chief Complaint   Patient presents with   • Left Foot - Post-op Follow-up       History of Present Illness    Ms Martinez is a 61 y/o diabetic female who presents for follow-up of left subtalar joint and talonavicular joint arthrodesis.  Date of surgery 9/30/2020.  She is doing well overall with improving postoperative pain.  She has remained nonweightbearing.    Past Medical History:   Diagnosis Date   • Angina, class IV (CMS/HCC)    • Anxiety    • Anxiety and depression    • Benign paroxysmal positional vertigo    • Bladder disorder     has bladder stimulator   • Carpal tunnel syndrome    • Chronic pain    • Coronary atherosclerosis     hx CABG 2005.  is followed by Dr Kwon   • Depression    • Diabetes mellitus (CMS/HCC)     Type 2, controlled   • Diabetic polyneuropathy (CMS/HCC)    • Disease of thyroid gland    • Elevated cholesterol    • Female stress incontinence    • Foot pain, left    • Full dentures    • Gastroparesis    • GERD (gastroesophageal reflux disease)    • Hyperlipidemia    • Hypertension    • Low back pain    • Malaise and fatigue    • Multiple joint pain    • Obesity     Refuses to be weighed   • Otalgia     Both   • Perforation of tympanic membrane     Left   • Postoperative wound infection    • Vitamin D deficiency    • Wears glasses     reading         Past Surgical History:   Procedure Laterality Date   • ABDOMINAL SURGERY     • ANGIOPLASTY      coronary   • BREAST BIOPSY Right    • CARDIAC CATHETERIZATION     • CARDIAC CATHETERIZATION N/A 6/20/2017    Procedure: Right Heart Cath;  Surgeon: Can Kwon MD PhD;  Location: [facility] INVASIVE LOCATION;  Service:    • CARDIAC CATHETERIZATION N/A 2/18/2020    Procedure: Left Heart Cath;  Surgeon:  Catalina Cooper MD;  Location: [facility] CATH INVASIVE LOCATION;  Service: Cardiology;  Laterality: N/A;   • CARPAL TUNNEL RELEASE     • CHOLECYSTECTOMY     • COLONOSCOPY N/A 6/24/2020    Procedure: COLONOSCOPY;  Surgeon: Julián Maldonado MD;  Location: [facility] ENDOSCOPY;  Service: Gastroenterology;  Laterality: N/A;   • CORONARY ARTERY BYPASS GRAFT  2005   • ENDOSCOPY N/A 10/19/2018    Procedure: ESOPHAGOGASTRODUODENOSCOPY possible dilation;  Surgeon: Julián Maldonado MD;  Location: [facility] ENDOSCOPY;  Service: Gastroenterology   • ENDOSCOPY N/A 6/24/2020    Procedure: ESOPHAGOGASTRODUODENOSCOPY WED appt please;  Surgeon: Julián Maldonado MD;  Location: [facility] ENDOSCOPY;  Service: Gastroenterology;  Laterality: N/A;   • ENDOSCOPY AND COLONOSCOPY     • FOOT SURGERY      Toes   • FOOT SURGERY     • GASTRIC BANDING      Revision, laparoscopic   • HYSTERECTOMY     • MOUTH SURGERY     • SALPINGO OOPHORECTOMY     • SHOULDER SURGERY     • SUBTALAR ARTHRODESIS Left 1/16/2019    Procedure: LEFT FOOT HARDWARE REMOVAL, FIFTH METATARSAL , OPEN REDUCTION INTERNAL FIXATION, CALCANEAL OSTEOTOMY;  Surgeon: Ignacio Lord DPM;  Location: [facility] OR;  Service: Podiatry   • SUBTALAR ARTHRODESIS Left 10/16/2019    Procedure: foot hardware removal, subtalar joint fusion  possible de/reattachment of achilles tendon        (c-arm);  Surgeon: Ignacio Lord DPM;  Location: [facility] OR;  Service: Podiatry   • SUBTALAR ARTHRODESIS Left 9/30/2020    Procedure: subtalar, talonavicular joint arthrodesis.  Removal hardware.          (c-arm);  Surgeon: Ignacio Lord DPM;  Location: [facility] OR;  Service: Podiatry;  Laterality: Left;   • TRANSESOPHAGEAL ECHOCARDIOGRAM (LAMONTE)      With color flow         Family History   Problem Relation Age of Onset   • Diabetes Other    • Heart disease Other    • Hypertension Other    • Heart disease Mother    • Stroke Mother    • Hypertension Mother    • Diabetes Sister    • Heart disease Sister    •  Hypertension Sister    • Heart disease Sister    • Diabetes Sister    • Hypertension Sister    • Diabetes Sister    • Diabetes Sister    • Diabetes Sister    • Diabetes Sister          Social History     Socioeconomic History   • Marital status:      Spouse name: Not on file   • Number of children: Not on file   • Years of education: Not on file   • Highest education level: Not on file   Tobacco Use   • Smoking status: Never Smoker   • Smokeless tobacco: Never Used   Substance and Sexual Activity   • Alcohol use: No   • Drug use: No   • Sexual activity: Defer         Current Outpatient Medications   Medication Sig Dispense Refill   • ARIPiprazole (ABILIFY) 15 MG tablet Take 1 tablet by mouth Daily. 90 tablet 1   • aspirin 81 MG chewable tablet Chew 81 mg daily.     • atorvastatin (LIPITOR) 80 MG tablet TAKE 1 TABLET BY MOUTH EVERY DAY (Patient taking differently: Take 80 mg by mouth Every Night.) 90 tablet 1   • BD SHARPS CONTAINER HOME misc 1 each Take As Directed. 1 each 0   • Blood Glucose Monitoring Suppl (ONE TOUCH ULTRA MINI) w/Device kit USE AS DIRECTED TO CHECK BLOOD SUGAR 1 each 0   • Calcium Citrate-Vitamin D (CITRACAL/VITAMIN D) 250-200 MG-UNIT tablet Take 2 tablets by mouth 2 (two) times a day.     • clopidogrel (PLAVIX) 75 MG tablet TAKE 1 TABLET BY MOUTH EVERY DAY (Patient taking differently: Take 75 mg by mouth Daily.) 90 tablet 1   • cyanocobalamin 1000 MCG/ML injection INJECT 1 ML INTO THE APPROPRIATE MUSCLE AS DIRECTED BY PRESCRIBER EVERY 28 (TWENTY-EIGHT) DAYS. 3 mL 0   • furosemide (LASIX) 40 MG tablet TAKE 1 TABLET BY MOUTH EVERY DAY (Patient taking differently: Take 40 mg by mouth Daily.) 90 tablet 1   • gabapentin (NEURONTIN) 400 MG capsule Take 1 capsule by mouth 3 (Three) Times a Day. 90 capsule 2   • GLUCAGON EMERGENCY 1 MG injection USE AS DIRECTED AS NEEDED 1 kit 0   • glucose blood (Accu-Chek Iris Plus) test strip USE TO CHECK BLOOD SUGAR 4 TIMES DAILY. ACCUCHECK, E11.9 120 each  "3   • glucose monitor monitoring kit 1 each Daily. accucheck eve meter, E11.9 1 each 0   • hydroCHLOROthiazide (HYDRODIURIL) 12.5 MG tablet Take 12.5 mg by mouth Daily.     • HYDROcodone-acetaminophen (NORCO)  MG per tablet Take 1 tablet by mouth Every 6 (Six) Hours As Needed for Moderate Pain . 120 tablet 0   • Insulin Glargine (BASAGLAR KWIKPEN) 100 UNIT/ML injection pen Inject 100 units under skin in the the appropriate area as directed every night.  (Patient taking differently: 60 Units Every Night.) 10 pen 2   • Insulin Pen Needle (B-D ULTRAFINE III SHORT PEN) 31G X 8 MM misc USE TO INJECT 4 TIMES A  each 11   • LORazepam (ATIVAN) 0.5 MG tablet Take 1 tablet by mouth Daily As Needed for Anxiety. 30 tablet 0   • meclizine (ANTIVERT) 25 MG tablet Take 1 tablet by mouth 3 (Three) Times a Day As Needed for dizziness. 90 tablet 6   • metoclopramide (REGLAN) 10 MG tablet TAKE 1 TABLET BY MOUTH 4 (FOUR) TIMES A DAY AS NEEDED (GASTROPARESIS). 120 tablet 1   • metoprolol succinate XL (TOPROL-XL) 25 MG 24 hr tablet TAKE 1 TABLET BY MOUTH EVERY DAY (Patient taking differently: Take 25 mg by mouth Daily.) 31 tablet 6   • mirtazapine (REMERON) 45 MG tablet TAKE 1 TABLET BY MOUTH EVERY NIGHT. 90 tablet 0   • NOVOLOG FLEXPEN 100 UNIT/ML solution pen-injector sc pen INJECT 60 UNITS BEFORE MEALS 3 TIMES A DAY (Patient taking differently: Inject 60 Units under the skin into the appropriate area as directed 3 (Three) Times a Day With Meals.) 162 mL 3   • ondansetron (ZOFRAN) 8 MG tablet TAKE 1 TABLET BY MOUTH EVERY 8 (EIGHT) HOURS AS NEEDED FOR NAUSEA OR VOMITING. 18 tablet 1   • pantoprazole (PROTONIX) 20 MG EC tablet Take 1 tablet by mouth Daily. 90 tablet 3   • Syringe/Needle, Disp, (Luer Lock Safety Syringes) 25G X 5/8\" 3 ML misc 1 each Daily. 1 Q28 DAYS 1 each 2   • venlafaxine XR (EFFEXOR-XR) 150 MG 24 hr capsule TAKE 1 CAPSULE BY MOUTH TWICE A DAY (Patient taking differently: Take 150 mg by mouth.) 90 " "capsule 1   • vitamin D (ERGOCALCIFEROL) 1.25 MG (17984 UT) capsule capsule TAKE ONE CAPSULE BY MOUTH EVERY SUNDAY. (Patient taking differently: Take 50,000 Units by mouth Every 7 (Seven) Days.) 12 capsule 2     No current facility-administered medications for this visit.      Review of Systems   Constitutional: Negative.    HENT: Negative.    Eyes: Negative.    Respiratory: Negative.    Cardiovascular: Negative.    Gastrointestinal: Negative.    Endocrine: Negative.    Genitourinary: Negative.    Musculoskeletal: Negative.         Left foot pain    Skin: Negative.    Allergic/Immunologic: Negative.    Neurological: Positive for numbness.   Hematological: Negative.    Psychiatric/Behavioral: Negative.          OBJECTIVE    Pulse 78   Ht 172.7 cm (68\")   Wt 112 kg (247 lb)   SpO2 99%   BMI 37.56 kg/m²       Physical Exam   Constitutional: she appears well-developed and well-nourished.   HEENT: Normocephalic. Atraumatic.  CV: No CP. RRR  Resp: Non-labored respirations.  Psychiatric: she has a normal mood and affect. her behavior is normal.           Lower Extremity Exam:  Pulses palpable.  Capillary fill time within normal limits.  Mild to moderate left foot and ankle edema.  No ecchymosis or erythema.  Left foot incisions coapted with sutures in place no signs of infection.  Subtalar, talonavicular joint rectus, rigid      Procedures         ASSESSMENT AND PLAN    Diagnoses and all orders for this visit:    1. Nonunion of subtalar arthrodesis (Primary)  -     XR Foot 3+ View Left    2. Pes cavus    3. Subtalar varus, acquired, left        -Patient doing well postoperatively  -Remaining sutures removed.    -Dressing change and well-padded below-knee fiberglass cast applied today.  -Continue strict nonweightbearing  -Patient with contralateral ankle abrasions from the cast.  Dispensed a vascular boot to protect right ankle.  -Recheck 2 weeks, cast change            This document has been electronically signed by " Ignacio Lord DPM on October 26, 2020 10:15 CDT

## 2020-10-30 RX ORDER — VENLAFAXINE HYDROCHLORIDE 150 MG/1
CAPSULE, EXTENDED RELEASE ORAL
Qty: 90 CAPSULE | Refills: 0 | Status: SHIPPED | OUTPATIENT
Start: 2020-10-30 | End: 2020-12-10 | Stop reason: SDUPTHER

## 2020-11-06 DIAGNOSIS — M96.0 NONUNION OF SUBTALAR ARTHRODESIS: ICD-10-CM

## 2020-11-06 DIAGNOSIS — M21.172 HINDFOOT VARUS, ACQUIRED, LEFT: ICD-10-CM

## 2020-11-09 ENCOUNTER — OFFICE VISIT (OUTPATIENT)
Dept: PODIATRY | Facility: CLINIC | Age: 60
End: 2020-11-09

## 2020-11-09 ENCOUNTER — TELEPHONE (OUTPATIENT)
Dept: FAMILY MEDICINE CLINIC | Facility: CLINIC | Age: 60
End: 2020-11-09

## 2020-11-09 VITALS — HEART RATE: 94 BPM | BODY MASS INDEX: 37.44 KG/M2 | OXYGEN SATURATION: 100 % | HEIGHT: 68 IN | WEIGHT: 247 LBS

## 2020-11-09 DIAGNOSIS — M21.172 SUBTALAR VARUS, ACQUIRED, LEFT: ICD-10-CM

## 2020-11-09 DIAGNOSIS — M96.0 NONUNION OF SUBTALAR ARTHRODESIS: Primary | ICD-10-CM

## 2020-11-09 DIAGNOSIS — Q66.70 PES CAVUS: ICD-10-CM

## 2020-11-09 PROCEDURE — 29405 APPL SHORT LEG CAST: CPT | Performed by: PODIATRIST

## 2020-11-09 PROCEDURE — 99024 POSTOP FOLLOW-UP VISIT: CPT | Performed by: PODIATRIST

## 2020-11-09 RX ORDER — HYDROCODONE BITARTRATE AND ACETAMINOPHEN 10; 325 MG/1; MG/1
1 TABLET ORAL EVERY 6 HOURS PRN
Qty: 120 TABLET | Refills: 0 | Status: SHIPPED | OUTPATIENT
Start: 2020-11-09 | End: 2020-11-12 | Stop reason: DRUGHIGH

## 2020-11-09 NOTE — PROGRESS NOTES
Ariana Luis Martinez  1960  60 y.o. female   PCP: Kimberly Ferguson 10/9/2020  BS - 178 per pt.    Patient presents today for post op appointment of left foot states her pain is 5/10 . Cast change and xray obtained today     11/09/2020        Chief Complaint   Patient presents with   • Left Foot - Post-op Follow-up       History of Present Illness    Ms Martinez is a 59 y/o diabetic female who presents for follow-up of left subtalar joint and talonavicular joint arthrodesis.  Date of surgery 9/30/2020.  She is doing well overall with improving postoperative pain.  She has remained nonweightbearing.    Past Medical History:   Diagnosis Date   • Angina, class IV (CMS/HCC)    • Anxiety    • Anxiety and depression    • Benign paroxysmal positional vertigo    • Bladder disorder     has bladder stimulator   • Carpal tunnel syndrome    • Chronic pain    • Coronary atherosclerosis     hx CABG 2005.  is followed by Dr Kwon   • Depression    • Diabetes mellitus (CMS/HCC)     Type 2, controlled   • Diabetic polyneuropathy (CMS/HCC)    • Disease of thyroid gland    • Elevated cholesterol    • Female stress incontinence    • Foot pain, left    • Full dentures    • Gastroparesis    • GERD (gastroesophageal reflux disease)    • Hyperlipidemia    • Hypertension    • Low back pain    • Malaise and fatigue    • Multiple joint pain    • Obesity     Refuses to be weighed   • Otalgia     Both   • Perforation of tympanic membrane     Left   • Postoperative wound infection    • Vitamin D deficiency    • Wears glasses     reading         Past Surgical History:   Procedure Laterality Date   • ABDOMINAL SURGERY     • ANGIOPLASTY      coronary   • BREAST BIOPSY Right    • CARDIAC CATHETERIZATION     • CARDIAC CATHETERIZATION N/A 6/20/2017    Procedure: Right Heart Cath;  Surgeon: Can Kwon MD PhD;  Location: [facility] INVASIVE LOCATION;  Service:    • CARDIAC CATHETERIZATION N/A 2/18/2020    Procedure: Left Heart Cath;  Surgeon:  Catalina Cooper MD;  Location: [facility] CATH INVASIVE LOCATION;  Service: Cardiology;  Laterality: N/A;   • CARPAL TUNNEL RELEASE     • CHOLECYSTECTOMY     • COLONOSCOPY N/A 6/24/2020    Procedure: COLONOSCOPY;  Surgeon: Julián Maldonado MD;  Location: [facility] ENDOSCOPY;  Service: Gastroenterology;  Laterality: N/A;   • CORONARY ARTERY BYPASS GRAFT  2005   • ENDOSCOPY N/A 10/19/2018    Procedure: ESOPHAGOGASTRODUODENOSCOPY possible dilation;  Surgeon: Julián Maldonado MD;  Location: [facility] ENDOSCOPY;  Service: Gastroenterology   • ENDOSCOPY N/A 6/24/2020    Procedure: ESOPHAGOGASTRODUODENOSCOPY WED appt please;  Surgeon: Julián Maldonado MD;  Location: [facility] ENDOSCOPY;  Service: Gastroenterology;  Laterality: N/A;   • ENDOSCOPY AND COLONOSCOPY     • FOOT SURGERY      Toes   • FOOT SURGERY     • GASTRIC BANDING      Revision, laparoscopic   • HYSTERECTOMY     • MOUTH SURGERY     • SALPINGO OOPHORECTOMY     • SHOULDER SURGERY     • SUBTALAR ARTHRODESIS Left 1/16/2019    Procedure: LEFT FOOT HARDWARE REMOVAL, FIFTH METATARSAL , OPEN REDUCTION INTERNAL FIXATION, CALCANEAL OSTEOTOMY;  Surgeon: Ignacio Lord DPM;  Location: [facility] OR;  Service: Podiatry   • SUBTALAR ARTHRODESIS Left 10/16/2019    Procedure: foot hardware removal, subtalar joint fusion  possible de/reattachment of achilles tendon        (c-arm);  Surgeon: Ignacio Lord DPM;  Location: [facility] OR;  Service: Podiatry   • SUBTALAR ARTHRODESIS Left 9/30/2020    Procedure: subtalar, talonavicular joint arthrodesis.  Removal hardware.          (c-arm);  Surgeon: Ignacio Lord DPM;  Location: [facility] OR;  Service: Podiatry;  Laterality: Left;   • TRANSESOPHAGEAL ECHOCARDIOGRAM (LAMONTE)      With color flow         Family History   Problem Relation Age of Onset   • Diabetes Other    • Heart disease Other    • Hypertension Other    • Heart disease Mother    • Stroke Mother    • Hypertension Mother    • Diabetes Sister    • Heart disease Sister    •  Hypertension Sister    • Heart disease Sister    • Diabetes Sister    • Hypertension Sister    • Diabetes Sister    • Diabetes Sister    • Diabetes Sister    • Diabetes Sister          Social History     Socioeconomic History   • Marital status:      Spouse name: Not on file   • Number of children: Not on file   • Years of education: Not on file   • Highest education level: Not on file   Tobacco Use   • Smoking status: Never Smoker   • Smokeless tobacco: Never Used   Substance and Sexual Activity   • Alcohol use: No   • Drug use: No   • Sexual activity: Defer         Current Outpatient Medications   Medication Sig Dispense Refill   • ARIPiprazole (ABILIFY) 15 MG tablet Take 1 tablet by mouth Daily. 90 tablet 1   • aspirin 81 MG chewable tablet Chew 81 mg daily.     • atorvastatin (LIPITOR) 80 MG tablet TAKE 1 TABLET BY MOUTH EVERY DAY (Patient taking differently: Take 80 mg by mouth Every Night.) 90 tablet 1   • BD SHARPS CONTAINER HOME misc 1 each Take As Directed. 1 each 0   • Blood Glucose Monitoring Suppl (ONE TOUCH ULTRA MINI) w/Device kit USE AS DIRECTED TO CHECK BLOOD SUGAR 1 each 0   • Calcium Citrate-Vitamin D (CITRACAL/VITAMIN D) 250-200 MG-UNIT tablet Take 2 tablets by mouth 2 (two) times a day.     • clopidogrel (PLAVIX) 75 MG tablet TAKE 1 TABLET BY MOUTH EVERY DAY (Patient taking differently: Take 75 mg by mouth Daily.) 90 tablet 1   • cyanocobalamin 1000 MCG/ML injection INJECT 1 ML INTO THE APPROPRIATE MUSCLE AS DIRECTED BY PRESCRIBER EVERY 28 (TWENTY-EIGHT) DAYS. 3 mL 0   • furosemide (LASIX) 40 MG tablet TAKE 1 TABLET BY MOUTH EVERY DAY (Patient taking differently: Take 40 mg by mouth Daily.) 90 tablet 1   • gabapentin (NEURONTIN) 400 MG capsule Take 1 capsule by mouth 3 (Three) Times a Day. 90 capsule 2   • GLUCAGON EMERGENCY 1 MG injection USE AS DIRECTED AS NEEDED 1 kit 0   • glucose blood (Accu-Chek Iris Plus) test strip USE TO CHECK BLOOD SUGAR 4 TIMES DAILY. ACCUCHECK, E11.9 120 each  "3   • glucose monitor monitoring kit 1 each Daily. accucheck eve meter, E11.9 1 each 0   • hydroCHLOROthiazide (HYDRODIURIL) 12.5 MG tablet Take 12.5 mg by mouth Daily.     • HYDROcodone-acetaminophen (NORCO)  MG per tablet Take 1 tablet by mouth Every 6 (Six) Hours As Needed for Moderate Pain . 120 tablet 0   • Insulin Glargine (BASAGLAR KWIKPEN) 100 UNIT/ML injection pen Inject 100 units under skin in the the appropriate area as directed every night.  (Patient taking differently: 60 Units Every Night.) 10 pen 2   • Insulin Pen Needle (B-D ULTRAFINE III SHORT PEN) 31G X 8 MM misc USE TO INJECT 4 TIMES A  each 11   • LORazepam (ATIVAN) 0.5 MG tablet Take 1 tablet by mouth Daily As Needed for Anxiety. 30 tablet 0   • meclizine (ANTIVERT) 25 MG tablet Take 1 tablet by mouth 3 (Three) Times a Day As Needed for dizziness. 90 tablet 6   • metoclopramide (REGLAN) 10 MG tablet TAKE 1 TABLET BY MOUTH 4 (FOUR) TIMES A DAY AS NEEDED (GASTROPARESIS). 120 tablet 1   • metoprolol succinate XL (TOPROL-XL) 25 MG 24 hr tablet TAKE 1 TABLET BY MOUTH EVERY DAY (Patient taking differently: Take 25 mg by mouth Daily.) 31 tablet 6   • mirtazapine (REMERON) 45 MG tablet TAKE 1 TABLET BY MOUTH EVERY NIGHT. 90 tablet 0   • NOVOLOG FLEXPEN 100 UNIT/ML solution pen-injector sc pen INJECT 60 UNITS BEFORE MEALS 3 TIMES A DAY (Patient taking differently: Inject 60 Units under the skin into the appropriate area as directed 3 (Three) Times a Day With Meals.) 162 mL 3   • ondansetron (ZOFRAN) 8 MG tablet TAKE 1 TABLET BY MOUTH EVERY 8 (EIGHT) HOURS AS NEEDED FOR NAUSEA OR VOMITING. 18 tablet 1   • pantoprazole (PROTONIX) 20 MG EC tablet Take 1 tablet by mouth Daily. 90 tablet 3   • Syringe/Needle, Disp, (Luer Lock Safety Syringes) 25G X 5/8\" 3 ML misc 1 each Daily. 1 Q28 DAYS 1 each 2   • venlafaxine XR (EFFEXOR-XR) 150 MG 24 hr capsule TAKE 1 CAPSULE BY MOUTH TWICE A DAY 90 capsule 0   • vitamin D (ERGOCALCIFEROL) 1.25 MG (06468 " "UT) capsule capsule TAKE ONE CAPSULE BY MOUTH EVERY SUNDAY. (Patient taking differently: Take 50,000 Units by mouth Every 7 (Seven) Days.) 12 capsule 2     No current facility-administered medications for this visit.      Review of Systems   Constitutional: Negative.    HENT: Negative.    Eyes: Negative.    Respiratory: Negative.    Cardiovascular: Negative.    Gastrointestinal: Negative.    Endocrine: Negative.    Genitourinary: Negative.    Musculoskeletal: Negative.         Left foot pain    Skin: Negative.    Allergic/Immunologic: Negative.    Neurological: Positive for numbness.   Hematological: Negative.    Psychiatric/Behavioral: Negative.          OBJECTIVE    Pulse 94   Ht 172.7 cm (68\")   Wt 112 kg (247 lb)   SpO2 100%   BMI 37.56 kg/m²       Physical Exam   Constitutional: she appears well-developed and well-nourished.   HEENT: Normocephalic. Atraumatic.  CV: No CP. RRR  Resp: Non-labored respirations.  Psychiatric: she has a normal mood and affect. her behavior is normal.           Lower Extremity Exam:  Pulses palpable.  Capillary fill time within normal limits.  Mild to moderate left foot and ankle edema.  No ecchymosis or erythema.  Left foot incisions well-healed, no signs of infection  Subtalar, talonavicular joint rectus, rigid      Procedures         ASSESSMENT AND PLAN    Diagnoses and all orders for this visit:    1. Nonunion of subtalar arthrodesis (Primary)  -     XR Foot 3+ View Left  -     XR Ankle 3+ View Left    2. Pes cavus    3. Subtalar varus, acquired, left        -Patient doing well postoperatively  -Radiographs ordered reviewed.  Alignment is maintained with no interval issues.  Patient does have some progressive ankle arthritis compared to prior exams.  We will continue to monitor.  -Dressing change and well-padded below-knee fiberglass cast applied today.  -Continue strict nonweightbearing  -Recheck 2 weeks, cast change            This document has been electronically signed by " Ignacio Lord DPM on November 9, 2020 10:31 CST

## 2020-11-09 NOTE — TELEPHONE ENCOUNTER
Patient has chronic anxiety requiring benzodiazepine use concurrently with chronic pain requiring opiate pain medication and/ or gabapentin. Patient has been educated regarding potential dangers of oversedation and accidental overdose with use of both medications concurrently. Serial assessments of patient has yielded no sign of oversedation or adverse effects of patient, and he/she is advised and aware to notify my office immediately if symptoms do occur, as well as to discontinue use of benzodiazepine medication and opiate medication immediately if that occurs, pending medical evaluation and advice. Patient has been compliant with use of medications, UDS, visits with no adverse effects noted. Patient understands the risks associated with this controlled medication, including tolerance and addiction.  Patient also agrees to only obtain this medication from me, and not from a another provider, unless that provider is covering for me in my absence.  Patient also agrees to be compliant in dosing, and not self adjust the dose of medication.  A signed controlled substance agreement is on file, and the patient has received a controlled substance education sheet at this a previous visit.  The patient has also signed a consent for treatment with a controlled substance as per [facility] policy. LESTER was obtained. Refills were sent for:   Hydrocodone.     This document has been electronically signed by BEBE Palomino on November 9, 2020 13:58 CST

## 2020-11-12 ENCOUNTER — LAB (OUTPATIENT)
Dept: LAB | Facility: OTHER | Age: 60
End: 2020-11-12

## 2020-11-12 ENCOUNTER — OFFICE VISIT (OUTPATIENT)
Dept: FAMILY MEDICINE CLINIC | Facility: CLINIC | Age: 60
End: 2020-11-12

## 2020-11-12 ENCOUNTER — DOCUMENTATION (OUTPATIENT)
Dept: CARDIOLOGY | Facility: CLINIC | Age: 60
End: 2020-11-12

## 2020-11-12 VITALS
HEART RATE: 73 BPM | WEIGHT: 247 LBS | BODY MASS INDEX: 37.44 KG/M2 | SYSTOLIC BLOOD PRESSURE: 106 MMHG | OXYGEN SATURATION: 100 % | RESPIRATION RATE: 18 BRPM | HEIGHT: 68 IN | TEMPERATURE: 97.7 F | DIASTOLIC BLOOD PRESSURE: 64 MMHG

## 2020-11-12 DIAGNOSIS — H66.002 ACUTE SUPPURATIVE OTITIS MEDIA OF LEFT EAR WITHOUT SPONTANEOUS RUPTURE OF TYMPANIC MEMBRANE, RECURRENCE NOT SPECIFIED: ICD-10-CM

## 2020-11-12 DIAGNOSIS — G89.29 CHRONIC RIGHT-SIDED LOW BACK PAIN WITH RIGHT-SIDED SCIATICA: Chronic | ICD-10-CM

## 2020-11-12 DIAGNOSIS — F41.1 GENERALIZED ANXIETY DISORDER: ICD-10-CM

## 2020-11-12 DIAGNOSIS — M54.41 CHRONIC RIGHT-SIDED LOW BACK PAIN WITH RIGHT-SIDED SCIATICA: Chronic | ICD-10-CM

## 2020-11-12 DIAGNOSIS — H92.01 OTALGIA OF RIGHT EAR: ICD-10-CM

## 2020-11-12 DIAGNOSIS — Z78.0 ASYMPTOMATIC AGE-RELATED POSTMENOPAUSAL STATE: ICD-10-CM

## 2020-11-12 DIAGNOSIS — Z51.81 THERAPEUTIC DRUG MONITORING: Primary | ICD-10-CM

## 2020-11-12 DIAGNOSIS — I10 ESSENTIAL HYPERTENSION: ICD-10-CM

## 2020-11-12 DIAGNOSIS — IMO0002 UNCONTROLLED TYPE 2 DIABETES MELLITUS WITH NEUROLOGIC COMPLICATION, WITH LONG-TERM CURRENT USE OF INSULIN: ICD-10-CM

## 2020-11-12 DIAGNOSIS — D50.0 IRON DEFICIENCY ANEMIA DUE TO CHRONIC BLOOD LOSS: ICD-10-CM

## 2020-11-12 DIAGNOSIS — Z51.81 THERAPEUTIC DRUG MONITORING: ICD-10-CM

## 2020-11-12 DIAGNOSIS — E55.9 VITAMIN D DEFICIENCY: ICD-10-CM

## 2020-11-12 DIAGNOSIS — E78.2 MIXED HYPERLIPIDEMIA: ICD-10-CM

## 2020-11-12 DIAGNOSIS — M79.672 CHRONIC FOOT PAIN, LEFT: ICD-10-CM

## 2020-11-12 DIAGNOSIS — G89.29 CHRONIC FOOT PAIN, LEFT: ICD-10-CM

## 2020-11-12 LAB
25(OH)D3 SERPL-MCNC: 31.6 NG/ML (ref 30–100)
ALBUMIN SERPL-MCNC: 4.2 G/DL (ref 3.5–5)
ALBUMIN UR-MCNC: 7.8 MG/DL
ALBUMIN/GLOB SERPL: 1.2 G/DL (ref 1.1–1.8)
ALP SERPL-CCNC: 141 U/L (ref 38–126)
ALT SERPL W P-5'-P-CCNC: 23 U/L
ANION GAP SERPL CALCULATED.3IONS-SCNC: 8 MMOL/L (ref 5–15)
AST SERPL-CCNC: 24 U/L (ref 14–36)
BASOPHILS # BLD AUTO: 0.03 10*3/MM3 (ref 0–0.2)
BASOPHILS NFR BLD AUTO: 0.3 % (ref 0–1.5)
BILIRUB SERPL-MCNC: 0.3 MG/DL (ref 0.2–1.3)
BNP SERPL-MCNC: 53.2 PG/ML (ref 0–100)
BUN SERPL-MCNC: 10 MG/DL (ref 7–23)
BUN/CREAT SERPL: 12.7 (ref 7–25)
CALCIUM SPEC-SCNC: 9.6 MG/DL (ref 8.4–10.2)
CHLORIDE SERPL-SCNC: 101 MMOL/L (ref 101–112)
CHOLEST SERPL-MCNC: 180 MG/DL (ref 150–200)
CO2 SERPL-SCNC: 31 MMOL/L (ref 22–30)
CREAT SERPL-MCNC: 0.79 MG/DL (ref 0.52–1.04)
CREAT UR-MCNC: 196.3 MG/DL
CREAT UR-MCNC: 196.3 MG/DL
DEPRECATED RDW RBC AUTO: 43 FL (ref 37–54)
EOSINOPHIL # BLD AUTO: 0.4 10*3/MM3 (ref 0–0.4)
EOSINOPHIL NFR BLD AUTO: 4.3 % (ref 0.3–6.2)
ERYTHROCYTE [DISTWIDTH] IN BLOOD BY AUTOMATED COUNT: 13.3 % (ref 12.3–15.4)
FERRITIN SERPL-MCNC: 418.5 NG/ML (ref 13–150)
GFR SERPL CREATININE-BSD FRML MDRD: 75 ML/MIN/1.73 (ref 51–120)
GLOBULIN UR ELPH-MCNC: 3.4 GM/DL (ref 2.3–3.5)
GLUCOSE SERPL-MCNC: 145 MG/DL (ref 70–99)
HBA1C MFR BLD: 9.7 % (ref 4.8–5.6)
HCT VFR BLD AUTO: 44.2 % (ref 34–46.6)
HDLC SERPL-MCNC: 53 MG/DL (ref 40–59)
HGB BLD-MCNC: 14.4 G/DL (ref 12–15.9)
IRON 24H UR-MRATE: 85 MCG/DL (ref 37–145)
IRON SATN MFR SERPL: 28 % (ref 20–50)
LDLC SERPL CALC-MCNC: 96 MG/DL
LDLC/HDLC SERPL: 1.72 {RATIO} (ref 0–3.22)
LYMPHOCYTES # BLD AUTO: 3.25 10*3/MM3 (ref 0.7–3.1)
LYMPHOCYTES NFR BLD AUTO: 35.2 % (ref 19.6–45.3)
MCH RBC QN AUTO: 29.2 PG (ref 26.6–33)
MCHC RBC AUTO-ENTMCNC: 32.6 G/DL (ref 31.5–35.7)
MCV RBC AUTO: 89.7 FL (ref 79–97)
MICROALBUMIN/CREAT UR: 39.7 MG/G
MONOCYTES # BLD AUTO: 0.62 10*3/MM3 (ref 0.1–0.9)
MONOCYTES NFR BLD AUTO: 6.7 % (ref 5–12)
NEUTROPHILS NFR BLD AUTO: 4.92 10*3/MM3 (ref 1.7–7)
NEUTROPHILS NFR BLD AUTO: 53.5 % (ref 42.7–76)
PLATELET # BLD AUTO: 391 10*3/MM3 (ref 140–450)
PMV BLD AUTO: 9.4 FL (ref 6–12)
POTASSIUM SERPL-SCNC: 3.3 MMOL/L (ref 3.4–5)
PROT SERPL-MCNC: 7.6 G/DL (ref 6.3–8.6)
PROT UR-MCNC: 40 MG/DL
PROT/CREAT UR: 203.8 MG/G CREA (ref 0–200)
RBC # BLD AUTO: 4.93 10*6/MM3 (ref 3.77–5.28)
SODIUM SERPL-SCNC: 140 MMOL/L (ref 137–145)
TIBC SERPL-MCNC: 304 MCG/DL (ref 298–536)
TRANSFERRIN SERPL-MCNC: 204 MG/DL (ref 200–360)
TRIGL SERPL-MCNC: 180 MG/DL
TSH SERPL DL<=0.05 MIU/L-ACNC: 5.44 UIU/ML (ref 0.27–4.2)
VIT B12 BLD-MCNC: 618 PG/ML (ref 211–946)
VLDLC SERPL-MCNC: 31 MG/DL (ref 5–40)
WBC # BLD AUTO: 9.22 10*3/MM3 (ref 3.4–10.8)

## 2020-11-12 PROCEDURE — 80061 LIPID PANEL: CPT | Performed by: NURSE PRACTITIONER

## 2020-11-12 PROCEDURE — G0481 DRUG TEST DEF 8-14 CLASSES: HCPCS | Performed by: NURSE PRACTITIONER

## 2020-11-12 PROCEDURE — 85025 COMPLETE CBC W/AUTO DIFF WBC: CPT | Performed by: NURSE PRACTITIONER

## 2020-11-12 PROCEDURE — 84156 ASSAY OF PROTEIN URINE: CPT | Performed by: NURSE PRACTITIONER

## 2020-11-12 PROCEDURE — 82306 VITAMIN D 25 HYDROXY: CPT | Performed by: NURSE PRACTITIONER

## 2020-11-12 PROCEDURE — 82728 ASSAY OF FERRITIN: CPT | Performed by: NURSE PRACTITIONER

## 2020-11-12 PROCEDURE — 84466 ASSAY OF TRANSFERRIN: CPT | Performed by: NURSE PRACTITIONER

## 2020-11-12 PROCEDURE — 80053 COMPREHEN METABOLIC PANEL: CPT | Performed by: NURSE PRACTITIONER

## 2020-11-12 PROCEDURE — 82607 VITAMIN B-12: CPT | Performed by: NURSE PRACTITIONER

## 2020-11-12 PROCEDURE — 36415 COLL VENOUS BLD VENIPUNCTURE: CPT | Performed by: NURSE PRACTITIONER

## 2020-11-12 PROCEDURE — 84443 ASSAY THYROID STIM HORMONE: CPT | Performed by: NURSE PRACTITIONER

## 2020-11-12 PROCEDURE — 82570 ASSAY OF URINE CREATININE: CPT | Performed by: NURSE PRACTITIONER

## 2020-11-12 PROCEDURE — 83540 ASSAY OF IRON: CPT | Performed by: NURSE PRACTITIONER

## 2020-11-12 PROCEDURE — 83880 ASSAY OF NATRIURETIC PEPTIDE: CPT | Performed by: INTERNAL MEDICINE

## 2020-11-12 PROCEDURE — 82043 UR ALBUMIN QUANTITATIVE: CPT | Performed by: NURSE PRACTITIONER

## 2020-11-12 PROCEDURE — 83036 HEMOGLOBIN GLYCOSYLATED A1C: CPT | Performed by: NURSE PRACTITIONER

## 2020-11-12 PROCEDURE — G0480 DRUG TEST DEF 1-7 CLASSES: HCPCS | Performed by: NURSE PRACTITIONER

## 2020-11-12 PROCEDURE — 80307 DRUG TEST PRSMV CHEM ANLYZR: CPT | Performed by: NURSE PRACTITIONER

## 2020-11-12 PROCEDURE — 99214 OFFICE O/P EST MOD 30 MIN: CPT | Performed by: NURSE PRACTITIONER

## 2020-11-12 RX ORDER — HYDROCODONE BITARTRATE AND ACETAMINOPHEN 7.5; 325 MG/1; MG/1
1 TABLET ORAL EVERY 6 HOURS PRN
Qty: 120 TABLET | Refills: 0 | Status: SHIPPED | OUTPATIENT
Start: 2020-11-12 | End: 2020-12-07 | Stop reason: SDUPTHER

## 2020-11-12 RX ORDER — GABAPENTIN 400 MG/1
400 CAPSULE ORAL 3 TIMES DAILY
Qty: 90 CAPSULE | Refills: 2 | Status: SHIPPED | OUTPATIENT
Start: 2020-11-12 | End: 2021-01-29 | Stop reason: SDUPTHER

## 2020-11-12 RX ORDER — LORAZEPAM 0.5 MG/1
0.5 TABLET ORAL DAILY PRN
Qty: 30 TABLET | Refills: 0 | Status: SHIPPED | OUTPATIENT
Start: 2020-11-12 | End: 2020-12-07 | Stop reason: SDUPTHER

## 2020-11-12 RX ORDER — AZITHROMYCIN 250 MG/1
TABLET, FILM COATED ORAL
Qty: 6 TABLET | Refills: 0 | Status: SHIPPED | OUTPATIENT
Start: 2020-11-12 | End: 2020-12-15

## 2020-11-12 NOTE — PROGRESS NOTES
Chief Complaint   Patient presents with   • Back Pain   • Peripheral Neuropathy   • Anxiety   • 12 wk Fu     Subjective   Ariana Martinez is a 60 y.o. female who presents to the office for teen follow-up of chronic back pain, peripheral neuropathy chronic anxiety.  The following portions of the patient's history were reviewed and updated as appropriate: allergies, current medications, past family history, past medical history, past social history, past surgical history and problem list.    History of Present Illness   Chronic lower back pain related to osteoarthritis with degenerative disc disease of the lumbar spine.  Pain is managed with hydrocodone.  Compliant with use.  Denies adverse effects of medication.  Reports this medication allows her to remain more active in the home and sleep more restfully, not awakened by pain.  Last refill was sent on 11/9/2020, not due for refill today.  She reports that on last fill she was issued 10 mg tablets by [facility] in [place].  She reports that when she takes the 10 mg tablets she has hallucinations.  Reports her pain is well controlled with 7.5 mg tablets.  The pharmacist at [facility] is contacted, who assures me that this decreased dose may be filled today.  The patient may dispose of her previous tablets if desired in a return receptacle in their lobby.  The patient is advised.    Chronic pain of the left foot due to fracture with malunion with subsequent surgical repair by Dr. Lord, podiatry.  Currently in a cast and sitting in wheelchair.  She follows up regularly with podiatry.  Reports that the pain in the left foot has improved significantly since surgery, and needs less opiate pain medication subsequently.    Diabetic polyneuropathy both feet, chronic.  Gradually worsening over time.  Pain is managed with gabapentin with good results.  Tolerates medication well.  Compliant with use.  Denies adverse effects.  Due for refill today.    Chronic anxiety,  chronic.  Gradually worsening over time.  Current medications for this include Abilify 15 mg daily, Effexor  mg daily, Remeron 45 mg daily and as needed lorazepam, at lowest possible dose.  Reports good results with current medications.  Feels emotions are well controlled.  Denies adverse effects of medications.  Aware of increased risk of oversedation or accidental overdose with coprescription of opiate and benzodiazepine.  Serial visits have demonstrated absence of the symptoms.  Due for refill lorazepam today.    Hypertension well controlled with blood pressure 106/64 and heart rate of 73 in office today.  Current medications include furosemide 40 mg daily, hydrochlorothiazide 12.5 mg daily, and Toprol-XL 25 mg daily.    CAD status post CABG in 2005, followed by Dr. Kwon cardiology.  CAD and associated hyperlipidemia managed with daily low-dose aspirin, atorvastatin 80 mg p.o. daily, Plavix 75 mg daily, beta-blocker.  Denies current or recent chest pain.    GERD, chronic.  Reports symptoms well controlled with Protonix 20 mg p.o. daily, there is some associated recurrent chronic nausea, due to combination GERD possible diabetic gastroparesis.  Nausea treated with as needed Zofran.    Vitamin D deficiency, chronic.  Managed with vitamin D2 50,000 units supplementation weekly.    Diabetes type 2, with resulting diabetic polyneuropathy, possibly some diabetic gastroparesis resulting in frequent nausea.  Diabetic medications include Basaglar basal insulin 100 units injected daily.  Prandial insulin coverage with NovoLog FlexPen up to 60 units injected prior to meals daily. . Reports no recent hypoglycemic episodes.  Diabetes not well controlled most recent A1c 6/5/2020 was 8.5%.  A1c due for repeat today, results are pending.    Vitamin B12 deficiency, chronic.  Managed with monthly injections of cyanocobalamin 1000 units.    Patient is overdue for bone density testing last performed in 2015 with normal  results.  The patient does take Citracal with D over-the-counter daily.    New complaints today, reports has had significant right otalgia for the past 2 weeks.  Pain is worse at night.  She has not had increased nasal drainage no drainage from the right ear no sore throat fevers chills or other sign of illness.  No exacerbation of dizziness.    Parts of most recent relevant visit HPI, ROS  and PE may be carried forward and all are updated as appropriate for current situation.    Past Medical History:   Diagnosis Date   • Angina, class IV (CMS/HCC)    • Anxiety    • Anxiety and depression    • Benign paroxysmal positional vertigo    • Bladder disorder     has bladder stimulator   • Carpal tunnel syndrome    • Chronic pain    • Coronary atherosclerosis     hx CABG 2005.  is followed by Dr Kwon   • Depression    • Diabetes mellitus (CMS/HCC)     Type 2, controlled   • Diabetic polyneuropathy (CMS/HCC)    • Disease of thyroid gland    • Elevated cholesterol    • Female stress incontinence    • Foot pain, left    • Full dentures    • Gastroparesis    • GERD (gastroesophageal reflux disease)    • Hyperlipidemia    • Hypertension    • Low back pain    • Malaise and fatigue    • Multiple joint pain    • Obesity     Refuses to be weighed   • Otalgia     Both   • Perforation of tympanic membrane     Left   • Postoperative wound infection    • Vitamin D deficiency    • Wears glasses     reading          Family History   Problem Relation Age of Onset   • Diabetes Other    • Heart disease Other    • Hypertension Other    • Heart disease Mother    • Stroke Mother    • Hypertension Mother    • Diabetes Sister    • Heart disease Sister    • Hypertension Sister    • Heart disease Sister    • Diabetes Sister    • Hypertension Sister    • Diabetes Sister    • Diabetes Sister    • Diabetes Sister    • Diabetes Sister         Review of Systems   Constitutional: Negative.  Negative for fever and unexpected weight change.   HENT:  "Negative.    Eyes: Negative.    Respiratory: Negative.  Negative for cough, chest tightness and shortness of breath.    Cardiovascular: Negative.  Negative for chest pain.   Gastrointestinal: Negative.    Endocrine: Negative.    Genitourinary: Negative.  Negative for dysuria.   Musculoskeletal: Positive for arthralgias, back pain and gait problem.   Skin: Negative.  Negative for color change, pallor, rash and wound.   Allergic/Immunologic: Negative.    Hematological: Negative.    Psychiatric/Behavioral: Negative.  Negative for sleep disturbance and suicidal ideas.   All other systems reviewed and are negative.      Objective   Vitals:    11/12/20 0904   BP: 106/64   Pulse: 73   Resp: 18   Temp: 97.7 °F (36.5 °C)   SpO2: 100%   Weight: 112 kg (247 lb)   Height: 172.7 cm (68\")   PainSc:   5   PainLoc: Foot  Comment: LT     Physical Exam  Vitals signs and nursing note reviewed.   Constitutional:       General: She is not in acute distress.     Appearance: Normal appearance. She is well-developed. She is obese. She is not diaphoretic.   HENT:      Head: Normocephalic and atraumatic.      Jaw: There is normal jaw occlusion.      Salivary Glands: Right salivary gland is not diffusely enlarged or tender. Left salivary gland is not diffusely enlarged or tender.      Right Ear: Ear canal and external ear normal. Right ear tenderness: Serous, with injection lower one third TM. A middle ear effusion is present. There is no impacted cerumen. Tympanic membrane is erythematous. Tympanic membrane is not perforated or bulging.      Left Ear: Ear canal and external ear normal. A middle ear effusion (Mucopurulent over lower one third with noted injection of TM) is present. There is no impacted cerumen. Tympanic membrane is erythematous. Tympanic membrane is not perforated or bulging.      Ears:      Comments: TM show evidence of recent infection with a small rim of creamy white purulent sediment along lower one third of outer " perimeter TMs.  There is still some mucopurulent effusion noted behind the left TM.  Both TMs are injected with mild redness just proximal to the TMs in the dependent external canal.  Eyes:      General: No scleral icterus.        Right eye: No discharge.         Left eye: No discharge.      Conjunctiva/sclera: Conjunctivae normal.      Pupils: Pupils are equal, round, and reactive to light.   Neck:      Musculoskeletal: Normal range of motion and neck supple.      Thyroid: No thyromegaly.      Vascular: No JVD.      Trachea: No tracheal deviation.   Cardiovascular:      Rate and Rhythm: Normal rate and regular rhythm.      Pulses: Normal pulses.      Heart sounds: Normal heart sounds. No murmur. No friction rub. No gallop.    Pulmonary:      Effort: Pulmonary effort is normal. No respiratory distress.      Breath sounds: Normal breath sounds. No wheezing or rales.   Chest:      Chest wall: No tenderness.   Abdominal:      General: Bowel sounds are normal.      Palpations: Abdomen is soft.   Musculoskeletal: Normal range of motion.         General: No tenderness or deformity.   Lymphadenopathy:      Cervical: No cervical adenopathy.   Skin:     General: Skin is warm and dry.      Capillary Refill: Capillary refill takes 2 to 3 seconds.      Coloration: Skin is not jaundiced or pale.      Findings: No bruising, erythema, lesion or rash.   Neurological:      General: No focal deficit present.      Mental Status: She is alert and oriented to person, place, and time.      Cranial Nerves: No cranial nerve deficit.   Psychiatric:         Mood and Affect: Mood normal.         Behavior: Behavior normal.         Thought Content: Thought content normal.         Judgment: Judgment normal.         Assessment/Plan   Diagnoses and all orders for this visit:    1. Therapeutic drug monitoring (Primary)  -     ToxASSURE Select 13 Discrete -; Future  -     Gabapentin, Urine -    2. Generalized anxiety disorder  -     LORazepam  (ATIVAN) 0.5 MG tablet; Take 1 tablet by mouth Daily As Needed for Anxiety.  Dispense: 30 tablet; Refill: 0    3. Chronic right-sided low back pain with right-sided sciatica  Comments:  controlled with Norco  Orders:  -     gabapentin (NEURONTIN) 400 MG capsule; Take 1 capsule by mouth 3 (Three) Times a Day.  Dispense: 90 capsule; Refill: 2  -     HYDROcodone-acetaminophen (NORCO) 7.5-325 MG per tablet; Take 1 tablet by mouth Every 6 (Six) Hours As Needed for Moderate Pain . Dose decreased 11/12/20  Dispense: 120 tablet; Refill: 0    4. Vitamin D deficiency  -     DEXA Bone Density Axial    5. Asymptomatic age-related postmenopausal state  -     DEXA Bone Density Axial    6. Chronic foot pain, left  -     HYDROcodone-acetaminophen (NORCO) 7.5-325 MG per tablet; Take 1 tablet by mouth Every 6 (Six) Hours As Needed for Moderate Pain . Dose decreased 11/12/20  Dispense: 120 tablet; Refill: 0    7. Otalgia of right ear  -     azithromycin (ZITHROMAX) 250 MG tablet; Take 2 tablets the first day, then 1 tablet daily for 4 days.  Dispense: 6 tablet; Refill: 0    8. Acute suppurative otitis media of left ear without spontaneous rupture of tympanic membrane, recurrence not specified  -     azithromycin (ZITHROMAX) 250 MG tablet; Take 2 tablets the first day, then 1 tablet daily for 4 days.  Dispense: 6 tablet; Refill: 0         Stable chronic back pain, and chronic pain of the left foot is improving.  Hydrocodone refill not due today, but dose has proved too high for patient-and is reordered today at the lower 7.5 mg dose.  Patient reports hallucinations when taking at the 10 mg dose currently.  She is advised to call for appointment immediately if hallucinations continue on the lower dose.  Poorly controlled diabetes most recent A1c 8.5% in June 2020.  A1c repeated today, results are pending.  Due for urine drug screen and multiple monitoring labs for chronic conditions.  Labs already collected today and pending.  Recent  bilateral AOM, current mucopurulent AOM left ear.  Positive otalgia.  Azithromycin prescribed today.  Chronic anxiety well controlled, refill lorazepam today.    Other chronic conditions apparently stable pending lab results today.  PHQ-2/PHQ-9 Depression Screening 11/12/2020   Little interest or pleasure in doing things 0   Feeling down, depressed, or hopeless 0   Trouble falling or staying asleep, or sleeping too much 1   Feeling tired or having little energy 0   Poor appetite or overeating 1   Feeling bad about yourself - or that you are a failure or have let yourself or your family down 0   Trouble concentrating on things, such as reading the newspaper or watching television 0   Moving or speaking so slowly that other people could have noticed. Or the opposite - being so fidgety or restless that you have been moving around a lot more than usual 0   Thoughts that you would be better off dead, or of hurting yourself in some way 0   Total Score 2   If you checked off any problems, how difficult have these problems made it for you to do your work, take care of things at home, or get along with other people? -   Patient understands the risks associated with this controlled medication, including tolerance and addiction.  Patient also agrees to only obtain this medication from me, and not from a another provider, unless that provider is covering for me in my absence.  Patient also agrees to be compliant in dosing, and not self adjust the dose of medication.  A signed controlled substance agreement is on file, and the patient has received a controlled substance education sheet at this a previous visit.  The patient has also signed a consent for treatment with a controlled substance as per [facility] policy. LESTER was obtained.      BEBE Palomino         Return in about 12 weeks (around 2/4/2021).    There are no Patient Instructions on file for this visit.

## 2020-11-12 NOTE — PROGRESS NOTES
Can Kwon MD PhD  Linda Orozco RN             Can you let her know that her B-type natriuretic peptide was normal?      Patient notified

## 2020-11-14 LAB — GABAPENTIN UR-MCNC: >800 UG/ML

## 2020-11-16 ENCOUNTER — TELEPHONE (OUTPATIENT)
Dept: FAMILY MEDICINE CLINIC | Facility: CLINIC | Age: 60
End: 2020-11-16

## 2020-11-16 LAB
6MAM UR QL CFM: NEGATIVE
6MAM/CREAT UR: NOT DETECTED NG/MG CREAT
7AMINOCLONAZEPAM/CREAT UR: NOT DETECTED NG/MG CREAT
A-OH ALPRAZ/CREAT UR: NOT DETECTED NG/MG CREAT
A-OH-TRIAZOLAM/CREAT UR CFM: NOT DETECTED NG/MG CREAT
ALFENTANIL/CREAT UR CFM: NOT DETECTED NG/MG CREAT
ALPHA-HYDROXYMIDAZOLAM, URINE: NOT DETECTED NG/MG CREAT
ALPRAZ/CREAT UR CFM: NOT DETECTED NG/MG CREAT
AMOBARBITAL UR QL CFM: NOT DETECTED
AMPHET/CREAT UR: NOT DETECTED NG/MG CREAT
AMPHETAMINES UR QL CFM: NEGATIVE
BARBITAL UR QL CFM: NOT DETECTED
BARBITURATES UR QL CFM: NEGATIVE
BENZODIAZ UR QL CFM: NORMAL
BUPRENORPHINE UR QL CFM: NEGATIVE
BUPRENORPHINE/CREAT UR: NOT DETECTED NG/MG CREAT
BUTABARBITAL UR QL CFM: NOT DETECTED
BUTALBITAL UR QL CFM: NOT DETECTED
BZE/CREAT UR: NOT DETECTED NG/MG CREAT
CANNABINOIDS UR QL CFM: NEGATIVE
CARBOXYTHC/CREAT UR: NOT DETECTED NG/MG CREAT
CLONAZEPAM/CREAT UR CFM: NOT DETECTED NG/MG CREAT
COCAETHYLENE/CREAT UR CFM: NOT DETECTED NG/MG CREAT
COCAINE UR QL CFM: NEGATIVE
COCAINE/CREAT UR CFM: NOT DETECTED NG/MG CREAT
CODEINE/CREAT UR: NOT DETECTED NG/MG CREAT
CREAT UR-MCNC: 218 MG/DL
DESALKYLFLURAZ/CREAT UR: NOT DETECTED NG/MG CREAT
DESMETHYLFLUNITRAZEPAM: NOT DETECTED NG/MG CREAT
DHC/CREAT UR: 216 NG/MG CREAT
DIAZEPAM/CREAT UR: NOT DETECTED NG/MG CREAT
DRUGS UR: NORMAL
EDDP/CREAT UR: NOT DETECTED NG/MG CREAT
ETHANOL UR CFM-MCNC: 0.25 G/DL
ETHANOL UR QL CFM: NORMAL
FENTANYL UR QL CFM: NEGATIVE
FENTANYL/CREAT UR: NOT DETECTED NG/MG CREAT
FLUNITRAZEPAM UR QL CFM: NOT DETECTED NG/MG CREAT
HYDROCODONE/CREAT UR: 2384 NG/MG CREAT
HYDROMORPHONE/CREAT UR: 188 NG/MG CREAT
LEVEL OF DETECTION:: NORMAL
LORAZEPAM/CREAT UR: 227 NG/MG CREAT
MDA/CREAT UR: NOT DETECTED NG/MG CREAT
MDMA/CREAT UR: NOT DETECTED NG/MG CREAT
MEPHOBARBITAL UR QL CFM: NOT DETECTED
METHADONE UR QL CFM: NEGATIVE
METHADONE/CREAT UR: NOT DETECTED NG/MG CREAT
METHAMPHET/CREAT UR: NOT DETECTED NG/MG CREAT
MIDAZOLAM/CREAT UR CFM: NOT DETECTED NG/MG CREAT
MORPHINE/CREAT UR: NOT DETECTED NG/MG CREAT
N-NORTRAMADOL/CREAT UR CFM: NOT DETECTED NG/MG CREAT
NARCOTICS UR: NEGATIVE
NORBUPRENORPHINE/CREAT UR: NOT DETECTED NG/MG CREAT
NORCODEINE/CREAT UR CFM: NOT DETECTED NG/MG CREAT
NORDIAZEPAM/CREAT UR: NOT DETECTED NG/MG CREAT
NORFENTANYL/CREAT UR: NOT DETECTED NG/MG CREAT
NORHYDROCODONE/CREAT UR: >2294 NG/MG CREAT
NORMORPHINE UR-MCNC: NOT DETECTED NG/MG CREAT
NOROXYCODONE/CREAT UR: NOT DETECTED NG/MG CREAT
NOROXYMORPHONE/CREAT UR CFM: NOT DETECTED NG/MG CREAT
O-NORTRAMADOL UR CFM-MCNC: NOT DETECTED NG/MG CREAT
OPIATES UR QL CFM: NORMAL
OXAZEPAM/CREAT UR: NOT DETECTED NG/MG CREAT
OXYCODONE UR QL CFM: NEGATIVE
OXYCODONE/CREAT UR: NOT DETECTED NG/MG CREAT
OXYMORPHONE/CREAT UR: NOT DETECTED NG/MG CREAT
PENTOBARB UR QL CFM: NOT DETECTED
PHENOBARB UR QL CFM: NOT DETECTED
SECOBARBITAL UR QL CFM: NOT DETECTED
SUFENTANIL/CREAT UR CFM: NOT DETECTED NG/MG CREAT
TAPENTADOL UR QL CFM: NEGATIVE
TAPENTADOL/CREAT UR: NOT DETECTED NG/MG CREAT
TEMAZEPAM/CREAT UR: NOT DETECTED NG/MG CREAT
THIOPENTAL UR QL CFM: NOT DETECTED
TRAMADOL UR QL CFM: NOT DETECTED NG/MG CREAT

## 2020-11-16 RX ORDER — METHYLPREDNISOLONE 4 MG/1
TABLET ORAL
Qty: 1 EACH | Refills: 0 | Status: SHIPPED | OUTPATIENT
Start: 2020-11-16 | End: 2020-12-15

## 2020-11-17 NOTE — PROGRESS NOTES
1. Does the patient have a moderate to severe fever?  No  2. Has the patient had a serious reaction to a flu shot before?   No  3. Has the patient ever had Guillian Addington Syndrome within 6 weeks of a previous flu shot?  No  4. Is the patient less than 6 months of age?  No    Patient is eligible to receive the vaccine based on all questions being answered as 'No'.   Ariana Luis Martinez  1960  59 y.o. female   WILMAR Fong - 02/06/19  BS - 83 per patient     Patient presents today for post op recheck.    03/07/2019        Chief Complaint   Patient presents with   • Left Foot - Post-op Follow-up   • Right Ankle - Follow-up       History of Present Illness    Ms Martinez is a 60 y/o diabetic female who presents for f/u left foot revision fifth metatarsal fracture nonunion and Rich calcaneal osteotomy.  Date of surgery 1/16/2019.  She has been nonweightbearing to her left lower extremity in a below-knee fiberglass cast.  In her postoperative course she did fall on postoperative week 1 likely causing a small avulsion fracture of her calcaneal osteotomy.  We have been treating this conservatively and monitoring with serial x-rays.  At last visit we did transition her into a Cam boot and allowed her to progress her weightbearing as tolerated.  She states that her pain is significantly improved today.  There is still some mild tenderness to the touch of the backside of her heel.    Past Medical History:   Diagnosis Date   • Angina, class IV (CMS/HCC)    • Anxiety    • Benign paroxysmal positional vertigo    • Carpal tunnel syndrome    • Chronic pain    • Coronary atherosclerosis     hx CABG 2005.  is followed by Dr Kwon   • Depression    • Diabetes mellitus (CMS/HCC)     Type 2, controlled   • Diabetic polyneuropathy (CMS/HCC)    • Elevated cholesterol    • Female stress incontinence    • Gastroparesis    • GERD (gastroesophageal reflux disease)    • Hyperlipidemia    • Hypertension    • Low back pain    • Malaise and fatigue    • Multiple joint pain    • Obesity     Refuses to be weighed   • Otalgia     Both   • Perforation of tympanic membrane     Left   • Postoperative wound infection    • Vitamin D deficiency          Past Surgical History:   Procedure Laterality Date   • ABDOMINAL SURGERY     • ANGIOPLASTY      coronary   • BREAST BIOPSY Right    • CARDIAC CATHETERIZATION      • CARDIAC CATHETERIZATION N/A 6/20/2017    Procedure: Right Heart Cath;  Surgeon: Can Kwon MD PhD;  Location: [facility] CATH INVASIVE LOCATION;  Service:    • CARPAL TUNNEL RELEASE     • CHOLECYSTECTOMY     • CORONARY ARTERY BYPASS GRAFT  2005   • ENDOSCOPY N/A 10/19/2018    Procedure: ESOPHAGOGASTRODUODENOSCOPY possible dilation;  Surgeon: Julián Maldonado MD;  Location: [facility] ENDOSCOPY;  Service: Gastroenterology   • ENDOSCOPY AND COLONOSCOPY     • FOOT SURGERY      Toes   • GASTRIC BANDING      Revision, laparoscopic   • HYSTERECTOMY     • MOUTH SURGERY     • SALPINGO OOPHORECTOMY     • SHOULDER SURGERY     • SUBTALAR ARTHRODESIS Left 1/16/2019    Procedure: LEFT FOOT HARDWARE REMOVAL, FIFTH METATARSAL , OPEN REDUCTION INTERNAL FIXATION, CALCANEAL OSTEOTOMY;  Surgeon: Ignacio Lord DPM;  Location: [facility] OR;  Service: Podiatry   • TRANSESOPHAGEAL ECHOCARDIOGRAM (LAMONTE)      With color flow         Family History   Problem Relation Age of Onset   • Diabetes Other    • Heart disease Other    • Hypertension Other    • Heart disease Mother    • Stroke Mother    • Hypertension Mother    • Diabetes Sister    • Heart disease Sister    • Hypertension Sister    • Heart disease Sister    • Diabetes Sister    • Hypertension Sister    • Diabetes Sister    • Diabetes Sister    • Diabetes Sister    • Diabetes Sister          Social History     Socioeconomic History   • Marital status:      Spouse name: Not on file   • Number of children: Not on file   • Years of education: Not on file   • Highest education level: Not on file   Social Needs   • Financial resource strain: Not on file   • Food insecurity - worry: Not on file   • Food insecurity - inability: Not on file   • Transportation needs - medical: Not on file   • Transportation needs - non-medical: Not on file   Occupational History   • Not on file   Tobacco Use   • Smoking status: Never Smoker   • Smokeless tobacco: Never Used   Substance and  Sexual Activity   • Alcohol use: No   • Drug use: No   • Sexual activity: Defer   Other Topics Concern   • Not on file   Social History Narrative   • Not on file         Current Outpatient Medications   Medication Sig Dispense Refill   • ARIPiprazole (ABILIFY) 15 MG tablet TAKE 1 TABLET BY MOUTH DAILY. 30 tablet 0   • aspirin 81 MG chewable tablet Chew 81 mg daily.     • atorvastatin (LIPITOR) 40 MG tablet Take 1 tablet by mouth Every Night. 90 tablet 1   • BD SHARPS CONTAINER HOME misc 1 each Take As Directed. 1 each 0   • Blood Glucose Monitoring Suppl (ONE TOUCH ULTRA MINI) w/Device kit USE AS DIRECTED TO CHECK BLOOD SUGAR 1 each 0   • Calcium Citrate-Vitamin D (CITRACAL/VITAMIN D) 250-200 MG-UNIT tablet Take 2 tablets by mouth 2 (two) times a day.     • clopidogrel (PLAVIX) 75 MG tablet Take 75 mg by mouth Daily.     • cyanocobalamin 1000 MCG/ML injection Inject 1 mL into the appropriate muscle as directed by prescriber Every 28 (Twenty-Eight) Days. 1 mL 0   • furosemide (LASIX) 40 MG tablet Take 1 tablet by mouth Daily. 90 tablet 1   • gabapentin (NEURONTIN) 400 MG capsule Take 400 mg by mouth 3 (Three) Times a Day.     • GLUCAGON EMERGENCY 1 MG injection USE AS DIRECTED AS NEEDED 1 kit 0   • glucose blood (ONE TOUCH ULTRA TEST) test strip 1 each by Other route 4 (Four) Times a Day. Use as instructed 400 each 1   • hydrochlorothiazide (MICROZIDE) 12.5 MG capsule Take 12.5 mg by mouth Daily.     • HYDROcodone-acetaminophen (NORCO) 7.5-325 MG per tablet Take 1 tablet by mouth Every 4 (Four) Hours As Needed for Moderate Pain . 180 tablet 0   • insulin aspart (novoLOG FLEXPEN) 100 UNIT/ML solution pen-injector sc pen Inject 60 Units under the skin into the appropriate area as directed 3 (Three) Times a Day With Meals.     • Insulin Glargine (BASAGLAR KWIKPEN) 100 UNIT/ML injection pen Inject 100 Units under the skin into the appropriate area as directed Every Night. 5 pen 5   • Insulin Pen Needle (B-D ULTRAFINE III  "SHORT PEN) 31G X 8 MM misc Inject 1 each into the shoulder, thigh, or buttocks 4 (Four) Times a Day. use as directed 150 each 11   • LORazepam (ATIVAN) 0.5 MG tablet Take 1 tablet by mouth Daily As Needed for Anxiety. 30 tablet 0   • meclizine (ANTIVERT) 25 MG tablet Take 1 tablet by mouth 3 (Three) Times a Day As Needed for dizziness. 90 tablet 6   • meloxicam (MOBIC) 15 MG tablet Take 1 tablet by mouth Daily. Take once daily. 30 tablet 0   • metoclopramide (REGLAN) 10 MG tablet Take 10 mg by mouth 4 (Four) Times a Day As Needed.     • metoprolol succinate XL (TOPROL-XL) 25 MG 24 hr tablet TAKE 1 TABLET BY MOUTH DAILY. 31 tablet 6   • mirtazapine (REMERON) 45 MG tablet TAKE 1 TABLET BY MOUTH EVERY NIGHT. 90 tablet 1   • ONE TOUCH ULTRA TEST test strip USE TO TEST SUGAR 4 TIMES A  each 3   • pantoprazole (PROTONIX) 40 MG EC tablet Take 40 mg by mouth Daily.     • SANTYL 250 UNIT/GM ointment APPLY TO AFFECTED AREA DAILY IN NICKEL INCH THICKNESS  5   • Syringe/Needle, Disp, (LUER LOCK SAFETY SYRINGES) 25G X 5/8\" 3 ML misc 1 each Daily. 100 each 0   • venlafaxine XR (EFFEXOR-XR) 150 MG 24 hr capsule Take 150 mg by mouth 2 (Two) Times a Day.     • vitamin D (ERGOCALCIFEROL) 54619 units capsule capsule Take 50,000 Units by mouth 1 (One) Time Per Week. sunday       No current facility-administered medications for this visit.      Review of Systems   Constitutional: Negative.    HENT: Negative.    Respiratory: Negative.    Musculoskeletal:        Left foot pain    Neurological: Positive for numbness.   Psychiatric/Behavioral: Negative.          OBJECTIVE    Pulse 91   Ht 170.2 cm (67\")   Wt 113 kg (250 lb)   SpO2 99%   BMI 39.16 kg/m²         Physical Exam   Constitutional: she appears well-developed and well-nourished.   Psychiatric: she has a normal mood and affect. her   behavior is normal.      Lower Extremity Exam:  Vascular: DP pulses palpable 2+. PT not readily palpable. +signal on doppler  Negative hair " growth.   Mild left foot edema  Negative Conrad  Mild ecchymosis to lateral midfoot  Neuro: Protective sensation absent to foot, b/l. Reestablished at mid calf   DTRs intact  Vibratory sensation diminished.  Integument: Left foot incisions well healed.  No signs of infection  No skin tenting is noted to the left heel  Small 0.7cm x 0.4 cm ulceration to right lateral malleolus.  No drainage.  Predominately granular base. No erythema.  Diffuse xerosis b/l.  Webspaces c/d/i  Musculoskeletal: Left ankle range of motion intact  Tenderness to palpation of left posterior heel.  Achilles tendon intact.         Procedures        ASSESSMENT AND PLAN    Ariana was seen today for post-op follow-up and follow-up.    Diagnoses and all orders for this visit:    Displaced fracture of fifth metatarsal bone, left foot, subsequent encounter for fracture with nonunion    Pes cavus    Diabetic polyneuropathy associated with type 2 diabetes mellitus (CMS/HCC)    Ankle wound, right, subsequent encounter        -Patient is doing well postoperatively.  Her pain is greatly improved.  -Unna boot applied to right lower extremity  -We will continue with cam boot on the left advancing weightbearing as tolerated.  - Recheck 1 week, serial radiographs            This document has been electronically signed by Ignacio Lord DPM on March 7, 2019 12:45 PM

## 2020-11-23 ENCOUNTER — OFFICE VISIT (OUTPATIENT)
Dept: PODIATRY | Facility: CLINIC | Age: 60
End: 2020-11-23

## 2020-11-23 VITALS — HEART RATE: 76 BPM | BODY MASS INDEX: 37.44 KG/M2 | OXYGEN SATURATION: 99 % | WEIGHT: 247 LBS | HEIGHT: 68 IN

## 2020-11-23 DIAGNOSIS — M96.0 NONUNION OF SUBTALAR ARTHRODESIS: Primary | ICD-10-CM

## 2020-11-23 DIAGNOSIS — Q66.70 PES CAVUS: ICD-10-CM

## 2020-11-23 PROCEDURE — 99024 POSTOP FOLLOW-UP VISIT: CPT | Performed by: PODIATRIST

## 2020-11-23 PROCEDURE — 29405 APPL SHORT LEG CAST: CPT | Performed by: PODIATRIST

## 2020-11-23 NOTE — PROGRESS NOTES
Ariana Luis Martinez  1960  60 y.o. female   PCP: Kimberly Ferguson 11/12/2020  BS - 99 per pt.    Patient presents today for post op appointment of left foot states her pain is 5/10 . Cast change and xray obtained today     11/23/2020      Chief Complaint   Patient presents with   • Left Foot - Follow-up, Post-op       History of Present Illness    Ms Martinez is a 61 y/o diabetic female who presents for follow-up of left subtalar joint and talonavicular joint arthrodesis.  Date of surgery 9/30/2020.  She is doing well overall with no postoperative pain.  She has remained nonweightbearing.    Past Medical History:   Diagnosis Date   • Angina, class IV (CMS/HCC)    • Anxiety    • Anxiety and depression    • Benign paroxysmal positional vertigo    • Bladder disorder     has bladder stimulator   • Carpal tunnel syndrome    • Chronic pain    • Coronary atherosclerosis     hx CABG 2005.  is followed by Dr Kwon   • Depression    • Diabetes mellitus (CMS/HCC)     Type 2, controlled   • Diabetic polyneuropathy (CMS/HCC)    • Disease of thyroid gland    • Elevated cholesterol    • Female stress incontinence    • Foot pain, left    • Full dentures    • Gastroparesis    • GERD (gastroesophageal reflux disease)    • Hyperlipidemia    • Hypertension    • Low back pain    • Malaise and fatigue    • Multiple joint pain    • Obesity     Refuses to be weighed   • Otalgia     Both   • Perforation of tympanic membrane     Left   • Postoperative wound infection    • Vitamin D deficiency    • Wears glasses     reading         Past Surgical History:   Procedure Laterality Date   • ABDOMINAL SURGERY     • ANGIOPLASTY      coronary   • BREAST BIOPSY Right    • CARDIAC CATHETERIZATION     • CARDIAC CATHETERIZATION N/A 6/20/2017    Procedure: Right Heart Cath;  Surgeon: Can Kwon MD PhD;  Location: [facility] INVASIVE LOCATION;  Service:    • CARDIAC CATHETERIZATION N/A 2/18/2020    Procedure: Left Heart Cath;  Surgeon: Kenneth  Catalina Robles MD;  Location: [facility] CATH INVASIVE LOCATION;  Service: Cardiology;  Laterality: N/A;   • CARPAL TUNNEL RELEASE     • CHOLECYSTECTOMY     • COLONOSCOPY N/A 6/24/2020    Procedure: COLONOSCOPY;  Surgeon: Julián Maldonado MD;  Location: [facility] ENDOSCOPY;  Service: Gastroenterology;  Laterality: N/A;   • CORONARY ARTERY BYPASS GRAFT  2005   • ENDOSCOPY N/A 10/19/2018    Procedure: ESOPHAGOGASTRODUODENOSCOPY possible dilation;  Surgeon: Julián Maldonado MD;  Location: [facility] ENDOSCOPY;  Service: Gastroenterology   • ENDOSCOPY N/A 6/24/2020    Procedure: ESOPHAGOGASTRODUODENOSCOPY WED appt please;  Surgeon: Julián Maldonado MD;  Location: [facility] ENDOSCOPY;  Service: Gastroenterology;  Laterality: N/A;   • ENDOSCOPY AND COLONOSCOPY     • FOOT SURGERY      Toes   • FOOT SURGERY     • GASTRIC BANDING      Revision, laparoscopic   • HYSTERECTOMY     • MOUTH SURGERY     • SALPINGO OOPHORECTOMY     • SHOULDER SURGERY     • SUBTALAR ARTHRODESIS Left 1/16/2019    Procedure: LEFT FOOT HARDWARE REMOVAL, FIFTH METATARSAL , OPEN REDUCTION INTERNAL FIXATION, CALCANEAL OSTEOTOMY;  Surgeon: Ignacio Lord DPM;  Location: [facility] OR;  Service: Podiatry   • SUBTALAR ARTHRODESIS Left 10/16/2019    Procedure: foot hardware removal, subtalar joint fusion  possible de/reattachment of achilles tendon        (c-arm);  Surgeon: Ignacio Lord DPM;  Location: [facility] OR;  Service: Podiatry   • SUBTALAR ARTHRODESIS Left 9/30/2020    Procedure: subtalar, talonavicular joint arthrodesis.  Removal hardware.          (c-arm);  Surgeon: Ignacio Lord DPM;  Location: [facility] OR;  Service: Podiatry;  Laterality: Left;   • TRANSESOPHAGEAL ECHOCARDIOGRAM (LAMONTE)      With color flow         Family History   Problem Relation Age of Onset   • Diabetes Other    • Heart disease Other    • Hypertension Other    • Heart disease Mother    • Stroke Mother    • Hypertension Mother    • Diabetes Sister    • Heart disease Sister    •  Hypertension Sister    • Heart disease Sister    • Diabetes Sister    • Hypertension Sister    • Diabetes Sister    • Diabetes Sister    • Diabetes Sister    • Diabetes Sister          Social History     Socioeconomic History   • Marital status:      Spouse name: Not on file   • Number of children: Not on file   • Years of education: Not on file   • Highest education level: Not on file   Tobacco Use   • Smoking status: Never Smoker   • Smokeless tobacco: Never Used   Substance and Sexual Activity   • Alcohol use: No   • Drug use: No   • Sexual activity: Defer         Current Outpatient Medications   Medication Sig Dispense Refill   • ARIPiprazole (ABILIFY) 15 MG tablet Take 1 tablet by mouth Daily. 90 tablet 1   • aspirin 81 MG chewable tablet Chew 81 mg daily.     • atorvastatin (LIPITOR) 80 MG tablet TAKE 1 TABLET BY MOUTH EVERY DAY (Patient taking differently: Take 80 mg by mouth Every Night.) 90 tablet 1   • azithromycin (ZITHROMAX) 250 MG tablet Take 2 tablets the first day, then 1 tablet daily for 4 days. 6 tablet 0   • BD SHARPS CONTAINER HOME misc 1 each Take As Directed. 1 each 0   • Blood Glucose Monitoring Suppl (ONE TOUCH ULTRA MINI) w/Device kit USE AS DIRECTED TO CHECK BLOOD SUGAR 1 each 0   • Calcium Citrate-Vitamin D (CITRACAL/VITAMIN D) 250-200 MG-UNIT tablet Take 2 tablets by mouth 2 (two) times a day.     • clopidogrel (PLAVIX) 75 MG tablet TAKE 1 TABLET BY MOUTH EVERY DAY (Patient taking differently: Take 75 mg by mouth Daily.) 90 tablet 1   • cyanocobalamin 1000 MCG/ML injection INJECT 1 ML INTO THE APPROPRIATE MUSCLE AS DIRECTED BY PRESCRIBER EVERY 28 (TWENTY-EIGHT) DAYS. 3 mL 0   • furosemide (LASIX) 40 MG tablet TAKE 1 TABLET BY MOUTH EVERY DAY (Patient taking differently: Take 40 mg by mouth Daily.) 90 tablet 1   • gabapentin (NEURONTIN) 400 MG capsule Take 1 capsule by mouth 3 (Three) Times a Day. 90 capsule 2   • GLUCAGON EMERGENCY 1 MG injection USE AS DIRECTED AS NEEDED 1 kit 0    • glucose blood (Accu-Chek Iris Plus) test strip USE TO CHECK BLOOD SUGAR 4 TIMES DAILY. ACCUCHECK, E11.9 120 each 3   • glucose monitor monitoring kit 1 each Daily. accucheck iris meter, E11.9 1 each 0   • hydroCHLOROthiazide (HYDRODIURIL) 12.5 MG tablet Take 12.5 mg by mouth Daily.     • HYDROcodone-acetaminophen (NORCO) 7.5-325 MG per tablet Take 1 tablet by mouth Every 6 (Six) Hours As Needed for Moderate Pain . Dose decreased 11/12/20 120 tablet 0   • Insulin Glargine (BASAGLAR KWIKPEN) 100 UNIT/ML injection pen Inject 100 units under skin in the the appropriate area as directed every night.  (Patient taking differently: 60 Units Every Night.) 10 pen 2   • Insulin Pen Needle (B-D ULTRAFINE III SHORT PEN) 31G X 8 MM misc USE TO INJECT 4 TIMES A  each 11   • LORazepam (ATIVAN) 0.5 MG tablet Take 1 tablet by mouth Daily As Needed for Anxiety. 30 tablet 0   • meclizine (ANTIVERT) 25 MG tablet Take 1 tablet by mouth 3 (Three) Times a Day As Needed for dizziness. 90 tablet 6   • methylPREDNISolone (MEDROL) 4 MG dose pack Take as directed on package instructions. 1 each 0   • metoclopramide (REGLAN) 10 MG tablet TAKE 1 TABLET BY MOUTH 4 (FOUR) TIMES A DAY AS NEEDED (GASTROPARESIS). 120 tablet 1   • metoprolol succinate XL (TOPROL-XL) 25 MG 24 hr tablet TAKE 1 TABLET BY MOUTH EVERY DAY (Patient taking differently: Take 25 mg by mouth Daily.) 31 tablet 6   • mirtazapine (REMERON) 45 MG tablet TAKE 1 TABLET BY MOUTH EVERY NIGHT. 90 tablet 0   • NOVOLOG FLEXPEN 100 UNIT/ML solution pen-injector sc pen INJECT 60 UNITS BEFORE MEALS 3 TIMES A DAY (Patient taking differently: Inject 60 Units under the skin into the appropriate area as directed 3 (Three) Times a Day With Meals.) 162 mL 3   • ondansetron (ZOFRAN) 8 MG tablet TAKE 1 TABLET BY MOUTH EVERY 8 (EIGHT) HOURS AS NEEDED FOR NAUSEA OR VOMITING. 18 tablet 1   • pantoprazole (PROTONIX) 20 MG EC tablet Take 1 tablet by mouth Daily. 90 tablet 3   •  "Syringe/Needle, Disp, (Luer Lock Safety Syringes) 25G X 5/8\" 3 ML misc 1 each Daily. 1 Q28 DAYS 1 each 2   • venlafaxine XR (EFFEXOR-XR) 150 MG 24 hr capsule TAKE 1 CAPSULE BY MOUTH TWICE A DAY 90 capsule 0   • vitamin D (ERGOCALCIFEROL) 1.25 MG (31666 UT) capsule capsule TAKE ONE CAPSULE BY MOUTH EVERY SUNDAY. (Patient taking differently: Take 50,000 Units by mouth Every 7 (Seven) Days.) 12 capsule 2     No current facility-administered medications for this visit.      Review of Systems   Constitutional: Negative.    HENT: Negative.    Eyes: Negative.    Respiratory: Negative.    Cardiovascular: Negative.    Gastrointestinal: Negative.    Endocrine: Negative.    Genitourinary: Negative.    Musculoskeletal: Negative.         Left foot pain    Skin: Negative.    Allergic/Immunologic: Negative.    Neurological: Positive for numbness.   Hematological: Negative.    Psychiatric/Behavioral: Negative.          OBJECTIVE    Pulse 76   Ht 172.7 cm (68\")   Wt 112 kg (247 lb)   SpO2 99%   BMI 37.56 kg/m²       Physical Exam   Constitutional: she appears well-developed and well-nourished.   HEENT: Normocephalic. Atraumatic.  CV: No CP. RRR  Resp: Non-labored respirations.  Psychiatric: she has a normal mood and affect. her behavior is normal.           Lower Extremity Exam:  Pulses palpable.  Capillary fill time within normal limits.  Mild  left foot and ankle edema.  No ecchymosis or erythema.  Left foot incisions well-healed, no signs of infection  Subtalar, talonavicular joint rectus, rigid      Procedures         ASSESSMENT AND PLAN    Diagnoses and all orders for this visit:    1. Nonunion of subtalar arthrodesis (Primary)  -     XR Foot 3+ View Left    2. Pes cavus        -Patient doing well postoperatively  -Radiographs ordered reviewed.  Alignment is maintained with no interval issues.    -Well-padded below-knee fiberglass cast applied today.  -Continue strict nonweightbearing  -Recheck 2 weeks, cast change vs " progression to walking boot            This document has been electronically signed by Ignacio Lord DPM on November 23, 2020 12:12 CST

## 2020-12-07 ENCOUNTER — OFFICE VISIT (OUTPATIENT)
Dept: PODIATRY | Facility: CLINIC | Age: 60
End: 2020-12-07

## 2020-12-07 VITALS — WEIGHT: 247 LBS | OXYGEN SATURATION: 99 % | HEIGHT: 68 IN | BODY MASS INDEX: 37.44 KG/M2 | HEART RATE: 78 BPM

## 2020-12-07 DIAGNOSIS — G89.29 CHRONIC FOOT PAIN, LEFT: ICD-10-CM

## 2020-12-07 DIAGNOSIS — Q66.70 PES CAVUS: Primary | ICD-10-CM

## 2020-12-07 DIAGNOSIS — G89.29 CHRONIC RIGHT-SIDED LOW BACK PAIN WITH RIGHT-SIDED SCIATICA: Chronic | ICD-10-CM

## 2020-12-07 DIAGNOSIS — M79.672 CHRONIC FOOT PAIN, LEFT: ICD-10-CM

## 2020-12-07 DIAGNOSIS — M21.172 SUBTALAR VARUS, ACQUIRED, LEFT: ICD-10-CM

## 2020-12-07 DIAGNOSIS — F41.1 GENERALIZED ANXIETY DISORDER: ICD-10-CM

## 2020-12-07 DIAGNOSIS — M96.0 NONUNION OF SUBTALAR ARTHRODESIS: ICD-10-CM

## 2020-12-07 DIAGNOSIS — M54.41 CHRONIC RIGHT-SIDED LOW BACK PAIN WITH RIGHT-SIDED SCIATICA: Chronic | ICD-10-CM

## 2020-12-07 PROCEDURE — 29405 APPL SHORT LEG CAST: CPT | Performed by: PODIATRIST

## 2020-12-07 PROCEDURE — 99024 POSTOP FOLLOW-UP VISIT: CPT | Performed by: PODIATRIST

## 2020-12-07 NOTE — PROGRESS NOTES
Ariana Luis Martinez  1960  60 y.o. female   PCP: Kimberly Ferguson 11/12/2020  BS - 158 per pt.    Patient presents today for post op appointment of left foot states her pain is 5/10 . Cast change and xray obtained today     12/07/2020        Chief Complaint   Patient presents with   • Left Foot - Post-op Follow-up       History of Present Illness    Ms Martinez is a 59 y/o diabetic female who presents for follow-up of left subtalar joint and talonavicular joint arthrodesis.  Date of surgery 9/30/2020.  She is doing well overall with no postoperative pain.  She has remained nonweightbearing.    Past Medical History:   Diagnosis Date   • Angina, class IV (CMS/HCC)    • Anxiety    • Anxiety and depression    • Benign paroxysmal positional vertigo    • Bladder disorder     has bladder stimulator   • Carpal tunnel syndrome    • Chronic pain    • Coronary atherosclerosis     hx CABG 2005.  is followed by Dr Kwon   • Depression    • Diabetes mellitus (CMS/HCC)     Type 2, controlled   • Diabetic polyneuropathy (CMS/HCC)    • Disease of thyroid gland    • Elevated cholesterol    • Female stress incontinence    • Foot pain, left    • Full dentures    • Gastroparesis    • GERD (gastroesophageal reflux disease)    • Hyperlipidemia    • Hypertension    • Low back pain    • Malaise and fatigue    • Multiple joint pain    • Obesity     Refuses to be weighed   • Otalgia     Both   • Perforation of tympanic membrane     Left   • Postoperative wound infection    • Vitamin D deficiency    • Wears glasses     reading         Past Surgical History:   Procedure Laterality Date   • ABDOMINAL SURGERY     • ANGIOPLASTY      coronary   • BREAST BIOPSY Right    • CARDIAC CATHETERIZATION     • CARDIAC CATHETERIZATION N/A 6/20/2017    Procedure: Right Heart Cath;  Surgeon: Can Kwon MD PhD;  Location: [facility] INVASIVE LOCATION;  Service:    • CARDIAC CATHETERIZATION N/A 2/18/2020    Procedure: Left Heart Cath;  Surgeon:  Catalina Cooper MD;  Location: [facility] CATH INVASIVE LOCATION;  Service: Cardiology;  Laterality: N/A;   • CARPAL TUNNEL RELEASE     • CHOLECYSTECTOMY     • COLONOSCOPY N/A 6/24/2020    Procedure: COLONOSCOPY;  Surgeon: Julián Maldonado MD;  Location: [facility] ENDOSCOPY;  Service: Gastroenterology;  Laterality: N/A;   • CORONARY ARTERY BYPASS GRAFT  2005   • ENDOSCOPY N/A 10/19/2018    Procedure: ESOPHAGOGASTRODUODENOSCOPY possible dilation;  Surgeon: Julián Maldonado MD;  Location: [facility] ENDOSCOPY;  Service: Gastroenterology   • ENDOSCOPY N/A 6/24/2020    Procedure: ESOPHAGOGASTRODUODENOSCOPY WED appt please;  Surgeon: Julián Maldonado MD;  Location: [facility] ENDOSCOPY;  Service: Gastroenterology;  Laterality: N/A;   • ENDOSCOPY AND COLONOSCOPY     • FOOT SURGERY      Toes   • FOOT SURGERY     • GASTRIC BANDING      Revision, laparoscopic   • HYSTERECTOMY     • MOUTH SURGERY     • SALPINGO OOPHORECTOMY     • SHOULDER SURGERY     • SUBTALAR ARTHRODESIS Left 1/16/2019    Procedure: LEFT FOOT HARDWARE REMOVAL, FIFTH METATARSAL , OPEN REDUCTION INTERNAL FIXATION, CALCANEAL OSTEOTOMY;  Surgeon: Ignacio Lord DPM;  Location: [facility] OR;  Service: Podiatry   • SUBTALAR ARTHRODESIS Left 10/16/2019    Procedure: foot hardware removal, subtalar joint fusion  possible de/reattachment of achilles tendon        (c-arm);  Surgeon: Ignacio Lord DPM;  Location: [facility] OR;  Service: Podiatry   • SUBTALAR ARTHRODESIS Left 9/30/2020    Procedure: subtalar, talonavicular joint arthrodesis.  Removal hardware.          (c-arm);  Surgeon: Ignacio Lord DPM;  Location: [facility] OR;  Service: Podiatry;  Laterality: Left;   • TRANSESOPHAGEAL ECHOCARDIOGRAM (LAMONTE)      With color flow         Family History   Problem Relation Age of Onset   • Diabetes Other    • Heart disease Other    • Hypertension Other    • Heart disease Mother    • Stroke Mother    • Hypertension Mother    • Diabetes Sister    • Heart disease Sister    •  Hypertension Sister    • Heart disease Sister    • Diabetes Sister    • Hypertension Sister    • Diabetes Sister    • Diabetes Sister    • Diabetes Sister    • Diabetes Sister          Social History     Socioeconomic History   • Marital status:      Spouse name: Not on file   • Number of children: Not on file   • Years of education: Not on file   • Highest education level: Not on file   Tobacco Use   • Smoking status: Never Smoker   • Smokeless tobacco: Never Used   Substance and Sexual Activity   • Alcohol use: No   • Drug use: No   • Sexual activity: Defer         Current Outpatient Medications   Medication Sig Dispense Refill   • ARIPiprazole (ABILIFY) 15 MG tablet Take 1 tablet by mouth Daily. 90 tablet 1   • aspirin 81 MG chewable tablet Chew 81 mg daily.     • atorvastatin (LIPITOR) 80 MG tablet TAKE 1 TABLET BY MOUTH EVERY DAY (Patient taking differently: Take 80 mg by mouth Every Night.) 90 tablet 1   • azithromycin (ZITHROMAX) 250 MG tablet Take 2 tablets the first day, then 1 tablet daily for 4 days. 6 tablet 0   • BD SHARPS CONTAINER HOME misc 1 each Take As Directed. 1 each 0   • Blood Glucose Monitoring Suppl (ONE TOUCH ULTRA MINI) w/Device kit USE AS DIRECTED TO CHECK BLOOD SUGAR 1 each 0   • Calcium Citrate-Vitamin D (CITRACAL/VITAMIN D) 250-200 MG-UNIT tablet Take 2 tablets by mouth 2 (two) times a day.     • clopidogrel (PLAVIX) 75 MG tablet TAKE 1 TABLET BY MOUTH EVERY DAY (Patient taking differently: Take 75 mg by mouth Daily.) 90 tablet 1   • cyanocobalamin 1000 MCG/ML injection INJECT 1 ML INTO THE APPROPRIATE MUSCLE AS DIRECTED BY PRESCRIBER EVERY 28 (TWENTY-EIGHT) DAYS. 3 mL 0   • furosemide (LASIX) 40 MG tablet TAKE 1 TABLET BY MOUTH EVERY DAY (Patient taking differently: Take 40 mg by mouth Daily.) 90 tablet 1   • gabapentin (NEURONTIN) 400 MG capsule Take 1 capsule by mouth 3 (Three) Times a Day. 90 capsule 2   • GLUCAGON EMERGENCY 1 MG injection USE AS DIRECTED AS NEEDED 1 kit 0    • glucose blood (Accu-Chek Iris Plus) test strip USE TO CHECK BLOOD SUGAR 4 TIMES DAILY. ACCUCHECK, E11.9 120 each 3   • glucose monitor monitoring kit 1 each Daily. accucheck iris meter, E11.9 1 each 0   • hydroCHLOROthiazide (HYDRODIURIL) 12.5 MG tablet Take 12.5 mg by mouth Daily.     • HYDROcodone-acetaminophen (NORCO) 7.5-325 MG per tablet Take 1 tablet by mouth Every 6 (Six) Hours As Needed for Moderate Pain . Dose decreased 11/12/20 120 tablet 0   • Insulin Glargine (BASAGLAR KWIKPEN) 100 UNIT/ML injection pen Inject 100 units under skin in the the appropriate area as directed every night.  (Patient taking differently: 60 Units Every Night.) 10 pen 2   • Insulin Pen Needle (B-D ULTRAFINE III SHORT PEN) 31G X 8 MM misc USE TO INJECT 4 TIMES A  each 11   • LORazepam (ATIVAN) 0.5 MG tablet Take 1 tablet by mouth Daily As Needed for Anxiety. 30 tablet 0   • meclizine (ANTIVERT) 25 MG tablet Take 1 tablet by mouth 3 (Three) Times a Day As Needed for dizziness. 90 tablet 6   • methylPREDNISolone (MEDROL) 4 MG dose pack Take as directed on package instructions. 1 each 0   • metoclopramide (REGLAN) 10 MG tablet TAKE 1 TABLET BY MOUTH 4 (FOUR) TIMES A DAY AS NEEDED (GASTROPARESIS). 120 tablet 1   • metoprolol succinate XL (TOPROL-XL) 25 MG 24 hr tablet TAKE 1 TABLET BY MOUTH EVERY DAY (Patient taking differently: Take 25 mg by mouth Daily.) 31 tablet 6   • mirtazapine (REMERON) 45 MG tablet TAKE 1 TABLET BY MOUTH EVERY NIGHT. 90 tablet 0   • NOVOLOG FLEXPEN 100 UNIT/ML solution pen-injector sc pen INJECT 60 UNITS BEFORE MEALS 3 TIMES A DAY (Patient taking differently: Inject 60 Units under the skin into the appropriate area as directed 3 (Three) Times a Day With Meals.) 162 mL 3   • ondansetron (ZOFRAN) 8 MG tablet TAKE 1 TABLET BY MOUTH EVERY 8 (EIGHT) HOURS AS NEEDED FOR NAUSEA OR VOMITING. 18 tablet 1   • pantoprazole (PROTONIX) 20 MG EC tablet Take 1 tablet by mouth Daily. 90 tablet 3   •  "Syringe/Needle, Disp, (Luer Lock Safety Syringes) 25G X 5/8\" 3 ML misc 1 each Daily. 1 Q28 DAYS 1 each 2   • venlafaxine XR (EFFEXOR-XR) 150 MG 24 hr capsule TAKE 1 CAPSULE BY MOUTH TWICE A DAY 90 capsule 0   • vitamin D (ERGOCALCIFEROL) 1.25 MG (98337 UT) capsule capsule TAKE ONE CAPSULE BY MOUTH EVERY SUNDAY. (Patient taking differently: Take 50,000 Units by mouth Every 7 (Seven) Days.) 12 capsule 2     No current facility-administered medications for this visit.      Review of Systems   Constitutional: Negative.    HENT: Negative.    Eyes: Negative.    Respiratory: Negative.    Cardiovascular: Negative.    Gastrointestinal: Negative.    Endocrine: Negative.    Genitourinary: Negative.    Musculoskeletal: Negative.         Left foot pain    Skin: Negative.    Allergic/Immunologic: Negative.    Neurological: Positive for numbness.   Hematological: Negative.    Psychiatric/Behavioral: Negative.          OBJECTIVE    Pulse 78   Ht 172.7 cm (68\")   Wt 112 kg (247 lb)   SpO2 99%   BMI 37.56 kg/m²       Physical Exam   Constitutional: she appears well-developed and well-nourished.   HEENT: Normocephalic. Atraumatic.  CV: No CP. RRR  Resp: Non-labored respirations.  Psychiatric: she has a normal mood and affect. her behavior is normal.           Lower Extremity Exam:  Pulses palpable.  Capillary fill time within normal limits.  Mild  left foot and ankle edema.  No ecchymosis or erythema.  Left foot incisions well-healed, no signs of infection  Subtalar, talonavicular joint rectus, rigid. Moderate residual pes cavus  No TTP      Procedures         ASSESSMENT AND PLAN    Diagnoses and all orders for this visit:    1. Nonunion of subtalar arthrodesis (Primary)  -     XR Foot 3+ View Left        -Patient doing well postoperatively  -Radiographs ordered reviewed.  Alignment is maintained with no interval issues, continued integration of subtalar joint bone graft.    -Well-padded below-knee fiberglass cast applied " today.  -Continue strict nonweightbearing  -Recheck 2 weeks,  progression to walking boot            This document has been electronically signed by Maira Epstein MA on December 7, 2020 09:07 CST

## 2020-12-08 ENCOUNTER — TELEPHONE (OUTPATIENT)
Dept: FAMILY MEDICINE CLINIC | Facility: CLINIC | Age: 60
End: 2020-12-08

## 2020-12-08 RX ORDER — HYDROCODONE BITARTRATE AND ACETAMINOPHEN 7.5; 325 MG/1; MG/1
1 TABLET ORAL EVERY 6 HOURS PRN
Qty: 120 TABLET | Refills: 0 | Status: SHIPPED | OUTPATIENT
Start: 2020-12-08 | End: 2021-01-11 | Stop reason: SDUPTHER

## 2020-12-08 RX ORDER — LORAZEPAM 0.5 MG/1
0.5 TABLET ORAL DAILY PRN
Qty: 30 TABLET | Refills: 0 | Status: SHIPPED | OUTPATIENT
Start: 2020-12-08 | End: 2021-01-29 | Stop reason: SDUPTHER

## 2020-12-09 ENCOUNTER — OFFICE VISIT (OUTPATIENT)
Dept: ENDOCRINOLOGY | Facility: CLINIC | Age: 60
End: 2020-12-09

## 2020-12-09 DIAGNOSIS — I10 ESSENTIAL HYPERTENSION: ICD-10-CM

## 2020-12-09 DIAGNOSIS — E55.9 VITAMIN D DEFICIENCY: ICD-10-CM

## 2020-12-09 DIAGNOSIS — IMO0002 UNCONTROLLED TYPE 2 DIABETES MELLITUS WITH NEUROLOGIC COMPLICATION, WITH LONG-TERM CURRENT USE OF INSULIN: ICD-10-CM

## 2020-12-09 DIAGNOSIS — E78.2 MIXED HYPERLIPIDEMIA: Primary | ICD-10-CM

## 2020-12-09 PROCEDURE — 99443 PR PHYS/QHP TELEPHONE EVALUATION 21-30 MIN: CPT | Performed by: NURSE PRACTITIONER

## 2020-12-09 RX ORDER — METOCLOPRAMIDE 10 MG/1
TABLET ORAL
Qty: 120 TABLET | Refills: 1 | Status: SHIPPED | OUTPATIENT
Start: 2020-12-09 | End: 2021-01-27

## 2020-12-09 NOTE — TELEPHONE ENCOUNTER
Patient has chronic anxiety requiring benzodiazepine use concurrently with chronic pain requiring opiate pain medication and/ or gabapentin. Patient has been educated regarding potential dangers of oversedation and accidental overdose with use of both medications concurrently. Serial assessments of patient has yielded no sign of oversedation or adverse effects of patient, and he/she is advised and aware to notify my office immediately if symptoms do occur, as well as to discontinue use of benzodiazepine medication and opiate medication immediately if that occurs, pending medical evaluation and advice. Patient has been compliant with use of medications, UDS, visits with no adverse effects noted. Patient understands the risks associated with this controlled medication, including tolerance and addiction.  Patient also agrees to only obtain this medication from me, and not from a another provider, unless that provider is covering for me in my absence.  Patient also agrees to be compliant in dosing, and not self adjust the dose of medication.  A signed controlled substance agreement is on file, and the patient has received a controlled substance education sheet at this a previous visit.  The patient has also signed a consent for treatment with a controlled substance as per [facility] policy. LESTER was obtained. Refills were sent for:   Ativan and hydrocodone.     This document has been electronically signed by BEBE Palomino on December 8, 2020 18:05 CST

## 2020-12-09 NOTE — PROGRESS NOTES
Subjective    Ariana Martinez is a 60 y.o. female. she is here today for follow-up.    History of Present Illness     You have chosen to receive care through a telephone visit. Do you consent to use a telephone visit for your medical care today? Yes          TELEHEALTH TELEPHONE VISIT     This a telephone visit due to [facility] current guidelines for social distancing due to the COVID 19 pandemic      [place] care provider Kimberly SPENCE      60 year old female presents for follow up     Reason diabetes mellitus type 2        Duration diagnosed at the age of 26    Onset of symptoms gradual    Timing constant    Quality uncontrolled    Severity moderate      Severity (Complications/Hospitalizations)  Secondary Macrovascular Complications: CAD, No CVA, No PAD     2 stents placed in feb. 2020         Secondary Microvascular Complications: No Diabetic Nephropathy, No Diabetic Retinopathy, Diabetic Neuropathy     Context  Diabetes Regimen: Insulin, Oral Medications           Lab Results   Component Value Date    HGBA1C 9.70 (H) 11/12/2020             Blood Glucose Readings     Off  dexcom sensor --- due to finances      States 200 up to 300             diet high carb     Exercise none         Hyperglycemic Symptoms: none  Hypoglycemic Episodes: Documented symptomatic hypoglycemia, Seizures and syncope related to hypoglycemia--none recent          The following portions of the patient's history were reviewed and updated as appropriate:   Past Medical History:   Diagnosis Date   • Angina, class IV (CMS/HCC)    • Anxiety    • Anxiety and depression    • Benign paroxysmal positional vertigo    • Bladder disorder     has bladder stimulator   • Carpal tunnel syndrome    • Chronic pain    • Coronary atherosclerosis     hx CABG 2005.  is followed by Dr Kwon   • Depression    • Diabetes mellitus (CMS/HCC)     Type 2, controlled   • Diabetic polyneuropathy (CMS/HCC)    • Disease of thyroid gland    • Elevated cholesterol    •  Female stress incontinence    • Foot pain, left    • Full dentures    • Gastroparesis    • GERD (gastroesophageal reflux disease)    • Hyperlipidemia    • Hypertension    • Low back pain    • Malaise and fatigue    • Multiple joint pain    • Obesity     Refuses to be weighed   • Otalgia     Both   • Perforation of tympanic membrane     Left   • Postoperative wound infection    • Vitamin D deficiency    • Wears glasses     reading     Past Surgical History:   Procedure Laterality Date   • ABDOMINAL SURGERY     • ANGIOPLASTY      coronary   • BREAST BIOPSY Right    • CARDIAC CATHETERIZATION     • CARDIAC CATHETERIZATION N/A 6/20/2017    Procedure: Right Heart Cath;  Surgeon: Can Kwon MD PhD;  Location: [facility] CATH INVASIVE LOCATION;  Service:    • CARDIAC CATHETERIZATION N/A 2/18/2020    Procedure: Left Heart Cath;  Surgeon: Catalina Cooper MD;  Location: [facility] CATH INVASIVE LOCATION;  Service: Cardiology;  Laterality: N/A;   • CARPAL TUNNEL RELEASE     • CHOLECYSTECTOMY     • COLONOSCOPY N/A 6/24/2020    Procedure: COLONOSCOPY;  Surgeon: Julián Maldonado MD;  Location: [facility] ENDOSCOPY;  Service: Gastroenterology;  Laterality: N/A;   • CORONARY ARTERY BYPASS GRAFT  2005   • ENDOSCOPY N/A 10/19/2018    Procedure: ESOPHAGOGASTRODUODENOSCOPY possible dilation;  Surgeon: Julián Maldonado MD;  Location: [facility] ENDOSCOPY;  Service: Gastroenterology   • ENDOSCOPY N/A 6/24/2020    Procedure: ESOPHAGOGASTRODUODENOSCOPY WED appt please;  Surgeon: Julián Maldonado MD;  Location: [facility] ENDOSCOPY;  Service: Gastroenterology;  Laterality: N/A;   • ENDOSCOPY AND COLONOSCOPY     • FOOT SURGERY      Toes   • FOOT SURGERY     • GASTRIC BANDING      Revision, laparoscopic   • HYSTERECTOMY     • MOUTH SURGERY     • SALPINGO OOPHORECTOMY     • SHOULDER SURGERY     • SUBTALAR ARTHRODESIS Left 1/16/2019    Procedure: LEFT FOOT HARDWARE REMOVAL, FIFTH METATARSAL , OPEN REDUCTION INTERNAL FIXATION, CALCANEAL OSTEOTOMY;   Surgeon: Ignacio Lord DPM;  Location: [facility];  Service: Podiatry   • SUBTALAR ARTHRODESIS Left 10/16/2019    Procedure: foot hardware removal, subtalar joint fusion  possible de/reattachment of achilles tendon        (c-arm);  Surgeon: Ignacio Lord DPM;  Location: [facility] OR;  Service: Podiatry   • SUBTALAR ARTHRODESIS Left 2020    Procedure: subtalar, talonavicular joint arthrodesis.  Removal hardware.          (c-arm);  Surgeon: Ignacio Lord DPM;  Location: [facility] OR;  Service: Podiatry;  Laterality: Left;   • TRANSESOPHAGEAL ECHOCARDIOGRAM (LAMONTE)      With color flow     Family History   Problem Relation Age of Onset   • Diabetes Other    • Heart disease Other    • Hypertension Other    • Heart disease Mother    • Stroke Mother    • Hypertension Mother    • Diabetes Sister    • Heart disease Sister    • Hypertension Sister    • Heart disease Sister    • Diabetes Sister    • Hypertension Sister    • Diabetes Sister    • Diabetes Sister    • Diabetes Sister    • Diabetes Sister      OB History        0    Para   0    Term   0       0    AB   0    Living   0       SAB   0    TAB   0    Ectopic   0    Molar        Multiple   0    Live Births                  Current Outpatient Medications   Medication Sig Dispense Refill   • ARIPiprazole (ABILIFY) 15 MG tablet Take 1 tablet by mouth Daily. 90 tablet 1   • aspirin 81 MG chewable tablet Chew 81 mg daily.     • atorvastatin (LIPITOR) 80 MG tablet TAKE 1 TABLET BY MOUTH EVERY DAY (Patient taking differently: Take 80 mg by mouth Every Night.) 90 tablet 1   • azithromycin (ZITHROMAX) 250 MG tablet Take 2 tablets the first day, then 1 tablet daily for 4 days. 6 tablet 0   • BD SHARPS CONTAINER HOME misc 1 each Take As Directed. 1 each 0   • Blood Glucose Monitoring Suppl (ONE TOUCH ULTRA MINI) w/Device kit USE AS DIRECTED TO CHECK BLOOD SUGAR 1 each 0   • Calcium Citrate-Vitamin D (CITRACAL/VITAMIN D) 250-200 MG-UNIT tablet Take 2  tablets by mouth 2 (two) times a day.     • clopidogrel (PLAVIX) 75 MG tablet TAKE 1 TABLET BY MOUTH EVERY DAY (Patient taking differently: Take 75 mg by mouth Daily.) 90 tablet 1   • cyanocobalamin 1000 MCG/ML injection INJECT 1 ML INTO THE APPROPRIATE MUSCLE AS DIRECTED BY PRESCRIBER EVERY 28 (TWENTY-EIGHT) DAYS. 3 mL 0   • furosemide (LASIX) 40 MG tablet TAKE 1 TABLET BY MOUTH EVERY DAY (Patient taking differently: Take 40 mg by mouth Daily.) 90 tablet 1   • gabapentin (NEURONTIN) 400 MG capsule Take 1 capsule by mouth 3 (Three) Times a Day. 90 capsule 2   • GLUCAGON EMERGENCY 1 MG injection USE AS DIRECTED AS NEEDED 1 kit 0   • glucose blood (Accu-Chek Iris Plus) test strip USE TO CHECK BLOOD SUGAR 4 TIMES DAILY. ACCUCHECK, E11.9 120 each 3   • glucose monitor monitoring kit 1 each Daily. accucheck iris meter, E11.9 1 each 0   • hydroCHLOROthiazide (HYDRODIURIL) 12.5 MG tablet Take 12.5 mg by mouth Daily.     • HYDROcodone-acetaminophen (NORCO) 7.5-325 MG per tablet Take 1 tablet by mouth Every 6 (Six) Hours As Needed for Moderate Pain . Dose decreased 11/12/20 120 tablet 0   • Insulin Glargine (BASAGLAR KWIKPEN) 100 UNIT/ML injection pen Inject 100 units under skin in the the appropriate area as directed every night.  (Patient taking differently: 60 Units Every Night.) 10 pen 2   • Insulin Pen Needle (B-D ULTRAFINE III SHORT PEN) 31G X 8 MM misc USE TO INJECT 4 TIMES A  each 11   • LORazepam (ATIVAN) 0.5 MG tablet Take 1 tablet by mouth Daily As Needed for Anxiety. 30 tablet 0   • meclizine (ANTIVERT) 25 MG tablet Take 1 tablet by mouth 3 (Three) Times a Day As Needed for dizziness. 90 tablet 6   • methylPREDNISolone (MEDROL) 4 MG dose pack Take as directed on package instructions. 1 each 0   • metoclopramide (REGLAN) 10 MG tablet TAKE 1 TABLET BY MOUTH 4 (FOUR) TIMES A DAY AS NEEDED (GASTROPARESIS). 120 tablet 1   • metoprolol succinate XL (TOPROL-XL) 25 MG 24 hr tablet TAKE 1 TABLET BY MOUTH EVERY  "DAY (Patient taking differently: Take 25 mg by mouth Daily.) 31 tablet 6   • mirtazapine (REMERON) 45 MG tablet TAKE 1 TABLET BY MOUTH EVERY NIGHT. 90 tablet 0   • NOVOLOG FLEXPEN 100 UNIT/ML solution pen-injector sc pen INJECT 60 UNITS BEFORE MEALS 3 TIMES A DAY (Patient taking differently: Inject 60 Units under the skin into the appropriate area as directed 3 (Three) Times a Day With Meals.) 162 mL 3   • ondansetron (ZOFRAN) 8 MG tablet TAKE 1 TABLET BY MOUTH EVERY 8 (EIGHT) HOURS AS NEEDED FOR NAUSEA OR VOMITING. 18 tablet 1   • pantoprazole (PROTONIX) 20 MG EC tablet Take 1 tablet by mouth Daily. 90 tablet 3   • Syringe/Needle, Disp, (Luer Lock Safety Syringes) 25G X 5/8\" 3 ML misc 1 each Daily. 1 Q28 DAYS 1 each 2   • venlafaxine XR (EFFEXOR-XR) 150 MG 24 hr capsule TAKE 1 CAPSULE BY MOUTH TWICE A DAY 90 capsule 0   • vitamin D (ERGOCALCIFEROL) 1.25 MG (48863 UT) capsule capsule TAKE ONE CAPSULE BY MOUTH EVERY SUNDAY. (Patient taking differently: Take 50,000 Units by mouth Every 7 (Seven) Days.) 12 capsule 2     No current facility-administered medications for this visit.      Allergies   Allergen Reactions   • Seroquel [Quetiapine] Anaphylaxis   • Avandia [Rosiglitazone] Swelling   • Morphine And Related Hallucinations   • Oxycodone Hallucinations     Social History     Socioeconomic History   • Marital status:      Spouse name: Not on file   • Number of children: Not on file   • Years of education: Not on file   • Highest education level: Not on file   Tobacco Use   • Smoking status: Never Smoker   • Smokeless tobacco: Never Used   Substance and Sexual Activity   • Alcohol use: No   • Drug use: No   • Sexual activity: Defer       Review of Systems  Review of Systems   Constitutional: Negative for activity change, appetite change, diaphoresis and fatigue.   HENT: Negative for facial swelling, sneezing, sore throat, tinnitus, trouble swallowing and voice change.    Eyes: Negative for photophobia, pain, " discharge, redness, itching and visual disturbance.   Respiratory: Negative for apnea, cough, choking, chest tightness and shortness of breath.    Cardiovascular: Negative for chest pain, palpitations and leg swelling.   Gastrointestinal: Negative for abdominal distention, abdominal pain, constipation, diarrhea, nausea and vomiting.   Endocrine: Negative for cold intolerance, heat intolerance, polydipsia, polyphagia and polyuria.   Genitourinary: Negative for difficulty urinating, dysuria, frequency, hematuria and urgency.   Musculoskeletal: Positive for gait problem. Negative for arthralgias, back pain, joint swelling, myalgias, neck pain and neck stiffness.   Skin: Negative for color change, pallor, rash and wound.   Neurological: Negative for dizziness, tremors, weakness, light-headedness, numbness and headaches.   Hematological: Negative for adenopathy. Does not bruise/bleed easily.   Psychiatric/Behavioral: Negative for behavioral problems, confusion and sleep disturbance.        Objective    There were no vitals taken for this visit.  Physical Exam  Neurological:      General: No focal deficit present.      Mental Status: She is alert.   Psychiatric:         Mood and Affect: Mood normal.         Thought Content: Thought content normal.         Judgment: Judgment normal.         Lab Review  Glucose (mg/dL)   Date Value   11/12/2020 145 (H)   09/23/2020 99   06/10/2020 389 (H)     Glucose, Arterial (mmol/L)   Date Value   10/14/2017 155     Sodium (mmol/L)   Date Value   11/12/2020 140   09/23/2020 142   06/10/2020 136     Potassium (mmol/L)   Date Value   11/12/2020 3.3 (L)   09/23/2020 3.6   06/10/2020 4.2     Chloride (mmol/L)   Date Value   11/12/2020 101   09/23/2020 102   06/10/2020 97 (L)     CO2 (mmol/L)   Date Value   11/12/2020 31.0 (H)   09/23/2020 28.0   06/10/2020 23.7     BUN (mg/dL)   Date Value   11/12/2020 10   09/23/2020 8   06/10/2020 10     Creatinine (mg/dL)   Date Value   11/12/2020 0.79    09/23/2020 0.75   06/10/2020 0.78     Hemoglobin A1C (%)   Date Value   11/12/2020 9.70 (H)   06/10/2020 8.50 (H)   03/09/2020 9.12 (H)     Triglycerides (mg/dL)   Date Value   11/12/2020 180 (H)   06/10/2020 181 (H)   03/09/2020 160 (H)     LDL Cholesterol  (mg/dL)   Date Value   11/12/2020 96   06/10/2020 68   03/09/2020 80       Assessment/Plan      1. Mixed hyperlipidemia    2. Essential hypertension    3. Vitamin D deficiency    4. 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, with long-term current use of insulin (CMS/[facility])    .    Medications prescribed:  Outpatient Encounter Medications as of 12/9/2020   Medication Sig Dispense Refill   • ARIPiprazole (ABILIFY) 15 MG tablet Take 1 tablet by mouth Daily. 90 tablet 1   • aspirin 81 MG chewable tablet Chew 81 mg daily.     • atorvastatin (LIPITOR) 80 MG tablet TAKE 1 TABLET BY MOUTH EVERY DAY (Patient taking differently: Take 80 mg by mouth Every Night.) 90 tablet 1   • azithromycin (ZITHROMAX) 250 MG tablet Take 2 tablets the first day, then 1 tablet daily for 4 days. 6 tablet 0   • BD SHARPS CONTAINER HOME misc 1 each Take As Directed. 1 each 0   • Blood Glucose Monitoring Suppl (ONE TOUCH ULTRA MINI) w/Device kit USE AS DIRECTED TO CHECK BLOOD SUGAR 1 each 0   • Calcium Citrate-Vitamin D (CITRACAL/VITAMIN D) 250-200 MG-UNIT tablet Take 2 tablets by mouth 2 (two) times a day.     • clopidogrel (PLAVIX) 75 MG tablet TAKE 1 TABLET BY MOUTH EVERY DAY (Patient taking differently: Take 75 mg by mouth Daily.) 90 tablet 1   • cyanocobalamin 1000 MCG/ML injection INJECT 1 ML INTO THE APPROPRIATE MUSCLE AS DIRECTED BY PRESCRIBER EVERY 28 (TWENTY-EIGHT) DAYS. 3 mL 0   • furosemide (LASIX) 40 MG tablet TAKE 1 TABLET BY MOUTH EVERY DAY (Patient taking differently: Take 40 mg by mouth Daily.) 90 tablet 1   • gabapentin (NEURONTIN) 400 MG capsule Take 1 capsule by mouth 3 (Three) Times a Day. 90 capsule 2   • GLUCAGON EMERGENCY 1 MG injection USE AS  DIRECTED AS NEEDED 1 kit 0   • glucose blood (Accu-Chek Iris Plus) test strip USE TO CHECK BLOOD SUGAR 4 TIMES DAILY. ACCUCHECK, E11.9 120 each 3   • glucose monitor monitoring kit 1 each Daily. accucheck iris meter, E11.9 1 each 0   • hydroCHLOROthiazide (HYDRODIURIL) 12.5 MG tablet Take 12.5 mg by mouth Daily.     • HYDROcodone-acetaminophen (NORCO) 7.5-325 MG per tablet Take 1 tablet by mouth Every 6 (Six) Hours As Needed for Moderate Pain . Dose decreased 11/12/20 120 tablet 0   • Insulin Glargine (BASAGLAR KWIKPEN) 100 UNIT/ML injection pen Inject 100 units under skin in the the appropriate area as directed every night.  (Patient taking differently: 60 Units Every Night.) 10 pen 2   • Insulin Pen Needle (B-D ULTRAFINE III SHORT PEN) 31G X 8 MM misc USE TO INJECT 4 TIMES A  each 11   • LORazepam (ATIVAN) 0.5 MG tablet Take 1 tablet by mouth Daily As Needed for Anxiety. 30 tablet 0   • meclizine (ANTIVERT) 25 MG tablet Take 1 tablet by mouth 3 (Three) Times a Day As Needed for dizziness. 90 tablet 6   • methylPREDNISolone (MEDROL) 4 MG dose pack Take as directed on package instructions. 1 each 0   • metoclopramide (REGLAN) 10 MG tablet TAKE 1 TABLET BY MOUTH 4 (FOUR) TIMES A DAY AS NEEDED (GASTROPARESIS). 120 tablet 1   • metoprolol succinate XL (TOPROL-XL) 25 MG 24 hr tablet TAKE 1 TABLET BY MOUTH EVERY DAY (Patient taking differently: Take 25 mg by mouth Daily.) 31 tablet 6   • mirtazapine (REMERON) 45 MG tablet TAKE 1 TABLET BY MOUTH EVERY NIGHT. 90 tablet 0   • NOVOLOG FLEXPEN 100 UNIT/ML solution pen-injector sc pen INJECT 60 UNITS BEFORE MEALS 3 TIMES A DAY (Patient taking differently: Inject 60 Units under the skin into the appropriate area as directed 3 (Three) Times a Day With Meals.) 162 mL 3   • ondansetron (ZOFRAN) 8 MG tablet TAKE 1 TABLET BY MOUTH EVERY 8 (EIGHT) HOURS AS NEEDED FOR NAUSEA OR VOMITING. 18 tablet 1   • pantoprazole (PROTONIX) 20 MG EC tablet Take 1 tablet by mouth Daily. 90  "tablet 3   • Syringe/Needle, Disp, (Luer Lock Safety Syringes) 25G X 5/8\" 3 ML misc 1 each Daily. 1 Q28 DAYS 1 each 2   • venlafaxine XR (EFFEXOR-XR) 150 MG 24 hr capsule TAKE 1 CAPSULE BY MOUTH TWICE A DAY 90 capsule 0   • vitamin D (ERGOCALCIFEROL) 1.25 MG (78889 UT) capsule capsule TAKE ONE CAPSULE BY MOUTH EVERY SUNDAY. (Patient taking differently: Take 50,000 Units by mouth Every 7 (Seven) Days.) 12 capsule 2     No facility-administered encounter medications on file as of 12/9/2020.        Orders placed during this encounter include:  Orders Placed This Encounter   Procedures   • Comprehensive Metabolic Panel     Standing Status:   Future     Standing Expiration Date:   12/9/2021   • Hemoglobin A1c     Standing Status:   Future     Standing Expiration Date:   12/9/2021   • Lipid Panel     Standing Status:   Future     Standing Expiration Date:   12/9/2021   • Microalbumin / Creatinine Urine Ratio - Urine, Clean Catch     Standing Status:   Future     Standing Expiration Date:   12/9/2021   • Protein / Creatinine Ratio, Urine - Urine, Clean Catch     Standing Status:   Future     Standing Expiration Date:   12/9/2021   • TSH     Standing Status:   Future     Standing Expiration Date:   12/9/2021   • Vitamin B12     Standing Status:   Future     Standing Expiration Date:   12/9/2021   • Vitamin D 25 Hydroxy     Standing Status:   Future     Standing Expiration Date:   12/9/2021   • CBC & Differential     Standing Status:   Future     Standing Expiration Date:   12/9/2021     Order Specific Question:   Manual Differential     Answer:   No     Glycemic Management     Diabetes mellitus not controlled         Lab Results   Component Value Date    HGBA1C 9.70 (H) 11/12/2020             Dexcom sensor ---off due to finances          Basaglar taking  55 am and 55 pm ---increase to 52 units BID         ==================        Novolog      Taking 60 units --increase 64 units          ==================     Jardiance-- " stopped         ==================     Metformin--stopped      ====================      bydureon - stopped        Victoza stopped due to side effects            januvia 100 mg daily  -stopped      ------------------------------     Lipid Management        on Lipitor   on fenofibrate                 LDL needs to be 70 or less     add zetia        Component      Latest Ref Rng & Units 11/12/2020   Total Cholesterol      150 - 200 mg/dL 180   Triglycerides      <=150 mg/dL 180 (H)   HDL Cholesterol      40 - 59 mg/dL 53   LDL Cholesterol      <=100 mg/dL 96   VLDL Cholesterol      5 - 40 mg/dL 31   LDL/HDL Ratio      0.00 - 3.22 1.72            Missing doses of Lipitor be compliant        Blood Pressure Management: Control of blood pressure  on ACEi     stopped the lasix         Microvascular Complication Monitoring:     Neurontin 400 mg po TID -- moderate relief she is taking        Component      Latest Ref Rng & Units 11/12/2020 11/12/2020           8:26 AM  8:26 AM   Protein/Creatinine Ratio      0.0 - 200.0 mg/G Crea 203.8 (H)    Creatinine, Urine      mg/dL 196.3 196.3   Total Protein, Urine      mg/dL 40.0    Microalbumin/Creatinine Ratio      mg/g  39.7   Microalbumin, Urine      mg/dL  7.8        intermittetly takes reglan for gastroparesis     states eye exam was March 2020                  Immunization - last influenza vaccine Oct. 2020        Other Diabetes Related Aspects     TSH abnormal from July to Sept 2014     TSH in the 5 to 7 range       Lab Results   Component Value Date    TSH 5.440 (H) 11/12/2020        She still does not want treatment and guidelines state we do not have to treat until the TSH is 7 or higher  ---     Vitamin D deficiency      Taking vitamin D supplement                Component      Latest Ref Rng & Units 6/10/2020   25 Hydroxy, Vitamin D      30.0 - 100.0 ng/ml 30.1                         Lab Results   Component Value Date     [id number] 06/10/2020                          4. Follow-up: Return in about 3 months (around 3/9/2021) for Recheck.        We spent 25 minutes including reviewing the patients electronic chart, reviewing medications, reviewing labs, active problems    I provided advice regarding diabetes management, dietary changes, adjustments of medication, self titration of insulin, refilled medications, ordering labs, future appointments    Patient was advised to contact the office if there are unanswered questions, trouble with blood glucose management, or any concerns

## 2020-12-10 RX ORDER — VENLAFAXINE HYDROCHLORIDE 150 MG/1
150 CAPSULE, EXTENDED RELEASE ORAL 2 TIMES DAILY
Qty: 90 CAPSULE | Refills: 1 | Status: SHIPPED | OUTPATIENT
Start: 2020-12-10 | End: 2021-03-08

## 2020-12-14 ENCOUNTER — LAB (OUTPATIENT)
Dept: LAB | Facility: HOSPITAL | Age: 60
End: 2020-12-14

## 2020-12-14 DIAGNOSIS — E61.1 IRON DEFICIENCY: Primary | ICD-10-CM

## 2020-12-14 DIAGNOSIS — E61.1 IRON DEFICIENCY: ICD-10-CM

## 2020-12-14 LAB
BASOPHILS # BLD AUTO: 0.05 10*3/MM3 (ref 0–0.2)
BASOPHILS NFR BLD AUTO: 0.5 % (ref 0–1.5)
DEPRECATED RDW RBC AUTO: 42.2 FL (ref 37–54)
EOSINOPHIL # BLD AUTO: 0.38 10*3/MM3 (ref 0–0.4)
EOSINOPHIL NFR BLD AUTO: 3.6 % (ref 0.3–6.2)
ERYTHROCYTE [DISTWIDTH] IN BLOOD BY AUTOMATED COUNT: 13.4 % (ref 12.3–15.4)
FERRITIN SERPL-MCNC: 379.3 NG/ML (ref 13–150)
FOLATE SERPL-MCNC: 10.3 NG/ML (ref 4.78–24.2)
HCT VFR BLD AUTO: 41.8 % (ref 34–46.6)
HGB BLD-MCNC: 14 G/DL (ref 12–15.9)
IMM GRANULOCYTES # BLD AUTO: 0.04 10*3/MM3 (ref 0–0.05)
IMM GRANULOCYTES NFR BLD AUTO: 0.4 % (ref 0–0.5)
IRON 24H UR-MRATE: 89 MCG/DL (ref 37–145)
IRON SATN MFR SERPL: 31 % (ref 20–50)
LYMPHOCYTES # BLD AUTO: 3.62 10*3/MM3 (ref 0.7–3.1)
LYMPHOCYTES NFR BLD AUTO: 34 % (ref 19.6–45.3)
MCH RBC QN AUTO: 29.2 PG (ref 26.6–33)
MCHC RBC AUTO-ENTMCNC: 33.5 G/DL (ref 31.5–35.7)
MCV RBC AUTO: 87.1 FL (ref 79–97)
MONOCYTES # BLD AUTO: 0.63 10*3/MM3 (ref 0.1–0.9)
MONOCYTES NFR BLD AUTO: 5.9 % (ref 5–12)
NEUTROPHILS NFR BLD AUTO: 5.92 10*3/MM3 (ref 1.7–7)
NEUTROPHILS NFR BLD AUTO: 55.6 % (ref 42.7–76)
NRBC BLD AUTO-RTO: 0 /100 WBC (ref 0–0.2)
PLATELET # BLD AUTO: 389 10*3/MM3 (ref 140–450)
PMV BLD AUTO: 9.5 FL (ref 6–12)
RBC # BLD AUTO: 4.8 10*6/MM3 (ref 3.77–5.28)
TIBC SERPL-MCNC: 291 MCG/DL (ref 298–536)
TRANSFERRIN SERPL-MCNC: 195 MG/DL (ref 200–360)
VIT B12 BLD-MCNC: 625 PG/ML (ref 211–946)
WBC # BLD AUTO: 10.64 10*3/MM3 (ref 3.4–10.8)

## 2020-12-14 PROCEDURE — 36415 COLL VENOUS BLD VENIPUNCTURE: CPT

## 2020-12-14 PROCEDURE — 85025 COMPLETE CBC W/AUTO DIFF WBC: CPT

## 2020-12-14 PROCEDURE — 82746 ASSAY OF FOLIC ACID SERUM: CPT

## 2020-12-14 PROCEDURE — 82607 VITAMIN B-12: CPT

## 2020-12-14 PROCEDURE — 82728 ASSAY OF FERRITIN: CPT

## 2020-12-14 PROCEDURE — 84466 ASSAY OF TRANSFERRIN: CPT

## 2020-12-14 PROCEDURE — 83540 ASSAY OF IRON: CPT

## 2020-12-15 ENCOUNTER — HOSPITAL ENCOUNTER (EMERGENCY)
Facility: HOSPITAL | Age: 60
Discharge: HOME | End: 2020-12-15
Attending: FAMILY MEDICINE | Admitting: FAMILY MEDICINE

## 2020-12-15 VITALS
HEART RATE: 66 BPM | OXYGEN SATURATION: 97 % | SYSTOLIC BLOOD PRESSURE: 125 MMHG | RESPIRATION RATE: 18 BRPM | DIASTOLIC BLOOD PRESSURE: 60 MMHG | TEMPERATURE: 97.2 F | BODY MASS INDEX: 37.44 KG/M2 | WEIGHT: 247 LBS | HEIGHT: 68 IN

## 2020-12-15 DIAGNOSIS — I10 ESSENTIAL HYPERTENSION: ICD-10-CM

## 2020-12-15 DIAGNOSIS — R04.0 EPISTAXIS: Primary | ICD-10-CM

## 2020-12-15 LAB
BASOPHILS # BLD AUTO: 0.05 10*3/MM3 (ref 0–0.2)
BASOPHILS NFR BLD AUTO: 0.5 % (ref 0–1.5)
DEPRECATED RDW RBC AUTO: 41.3 FL (ref 37–54)
EOSINOPHIL # BLD AUTO: 0.4 10*3/MM3 (ref 0–0.4)
EOSINOPHIL NFR BLD AUTO: 3.9 % (ref 0.3–6.2)
ERYTHROCYTE [DISTWIDTH] IN BLOOD BY AUTOMATED COUNT: 13.4 % (ref 12.3–15.4)
HCT VFR BLD AUTO: 40.2 % (ref 34–46.6)
HGB BLD-MCNC: 13.8 G/DL (ref 12–15.9)
HOLD SPECIMEN: NORMAL
IMM GRANULOCYTES # BLD AUTO: 0.06 10*3/MM3 (ref 0–0.05)
IMM GRANULOCYTES NFR BLD AUTO: 0.6 % (ref 0–0.5)
INR PPP: 0.87 (ref 0.8–1.2)
LYMPHOCYTES # BLD AUTO: 3.11 10*3/MM3 (ref 0.7–3.1)
LYMPHOCYTES NFR BLD AUTO: 30 % (ref 19.6–45.3)
MCH RBC QN AUTO: 29.7 PG (ref 26.6–33)
MCHC RBC AUTO-ENTMCNC: 34.3 G/DL (ref 31.5–35.7)
MCV RBC AUTO: 86.6 FL (ref 79–97)
MONOCYTES # BLD AUTO: 0.68 10*3/MM3 (ref 0.1–0.9)
MONOCYTES NFR BLD AUTO: 6.6 % (ref 5–12)
NEUTROPHILS NFR BLD AUTO: 58.4 % (ref 42.7–76)
NEUTROPHILS NFR BLD AUTO: 6.07 10*3/MM3 (ref 1.7–7)
NRBC BLD AUTO-RTO: 0 /100 WBC (ref 0–0.2)
PLATELET # BLD AUTO: 336 10*3/MM3 (ref 140–450)
PMV BLD AUTO: 9.3 FL (ref 6–12)
PROTHROMBIN TIME: 12.2 SECONDS (ref 11.1–15.3)
RBC # BLD AUTO: 4.64 10*6/MM3 (ref 3.77–5.28)
WBC # BLD AUTO: 10.37 10*3/MM3 (ref 3.4–10.8)
WHOLE BLOOD HOLD SPECIMEN: NORMAL

## 2020-12-15 PROCEDURE — 85610 PROTHROMBIN TIME: CPT | Performed by: FAMILY MEDICINE

## 2020-12-15 PROCEDURE — 99283 EMERGENCY DEPT VISIT LOW MDM: CPT

## 2020-12-15 PROCEDURE — 25010000002 LORAZEPAM PER 2 MG: Performed by: FAMILY MEDICINE

## 2020-12-15 PROCEDURE — 99284 EMERGENCY DEPT VISIT MOD MDM: CPT

## 2020-12-15 PROCEDURE — 96374 THER/PROPH/DIAG INJ IV PUSH: CPT

## 2020-12-15 PROCEDURE — 85025 COMPLETE CBC W/AUTO DIFF WBC: CPT | Performed by: FAMILY MEDICINE

## 2020-12-15 RX ORDER — AMOXICILLIN 500 MG/1
500 CAPSULE ORAL 3 TIMES DAILY
Qty: 30 CAPSULE | Refills: 0 | Status: SHIPPED | OUTPATIENT
Start: 2020-12-15 | End: 2021-01-19

## 2020-12-15 RX ORDER — CLONIDINE HYDROCHLORIDE 0.1 MG/1
0.1 TABLET ORAL 2 TIMES DAILY
Qty: 20 TABLET | Refills: 0 | Status: SHIPPED | OUTPATIENT
Start: 2020-12-15 | End: 2021-02-24

## 2020-12-15 RX ORDER — LORAZEPAM 2 MG/ML
1 INJECTION INTRAMUSCULAR ONCE
Status: COMPLETED | OUTPATIENT
Start: 2020-12-15 | End: 2020-12-15

## 2020-12-15 RX ORDER — CLONIDINE HYDROCHLORIDE 0.1 MG/1
0.1 TABLET ORAL ONCE
Status: COMPLETED | OUTPATIENT
Start: 2020-12-15 | End: 2020-12-15

## 2020-12-15 RX ADMIN — CLONIDINE HYDROCHLORIDE 0.1 MG: 0.1 TABLET ORAL at 07:45

## 2020-12-15 RX ADMIN — METOPROLOL TARTRATE 25 MG: 25 TABLET, FILM COATED ORAL at 07:45

## 2020-12-15 RX ADMIN — LORAZEPAM 1 MG: 2 INJECTION, SOLUTION INTRAMUSCULAR; INTRAVENOUS at 08:56

## 2020-12-15 NOTE — ED PROVIDER NOTES
Subjective   Right sided epistaxis, intermittent, x 3 days, non-traumatic.  Patient states that she had nasal surgery performed 2 years ago when she broke her nose.  She is currently taking aspirin and Plavix for coronary disease, status post CABG.      Nose Bleed  Location:  R nare  Severity:  Unable to specify  Duration:  3 days  Timing:  Intermittent  Chronicity:  New  Context: aspirin use    Worsened by:  Nothing  Associated symptoms: no congestion, no cough, no dizziness, no fever, no headaches and no sore throat        Review of Systems   Constitutional: Negative for appetite change, chills, diaphoresis, fatigue and fever.   HENT: Positive for nosebleeds. Negative for congestion, ear discharge, ear pain, rhinorrhea, sinus pressure, sore throat and trouble swallowing.    Eyes: Negative for discharge and redness.   Respiratory: Negative for apnea, cough, chest tightness, shortness of breath and wheezing.    Cardiovascular: Negative for chest pain.   Gastrointestinal: Negative for abdominal pain, diarrhea, nausea and vomiting.   Endocrine: Negative for polyuria.   Genitourinary: Negative for dysuria, frequency and urgency.   Musculoskeletal: Negative for myalgias and neck pain.   Skin: Negative for color change and rash.   Allergic/Immunologic: Negative for immunocompromised state.   Neurological: Negative for dizziness, seizures, syncope, weakness, light-headedness and headaches.   Hematological: Negative for adenopathy. Does not bruise/bleed easily.   Psychiatric/Behavioral: Negative for behavioral problems and confusion.   All other systems reviewed and are negative.      Past Medical History:   Diagnosis Date   • Angina, class IV (CMS/HCC)    • Anxiety    • Anxiety and depression    • Benign paroxysmal positional vertigo    • Bladder disorder     has bladder stimulator   • Carpal tunnel syndrome    • Chronic pain    • Coronary atherosclerosis     hx CABG 2005.  is followed by Dr Kwon   • Depression    •  Diabetes mellitus (CMS/[facility])     Type 2, controlled   • Diabetic polyneuropathy (CMS/[facility])    • Disease of thyroid gland    • Elevated cholesterol    • Female stress incontinence    • Foot pain, left    • Full dentures    • Gastroparesis    • GERD (gastroesophageal reflux disease)    • Hyperlipidemia    • Hypertension    • Low back pain    • Malaise and fatigue    • Multiple joint pain    • Obesity     Refuses to be weighed   • Otalgia     Both   • Perforation of tympanic membrane     Left   • Postoperative wound infection    • Vitamin D deficiency    • Wears glasses     reading       Allergies   Allergen Reactions   • Seroquel [Quetiapine] Anaphylaxis   • Avandia [Rosiglitazone] Swelling   • Morphine And Related Hallucinations   • Oxycodone Hallucinations       Past Surgical History:   Procedure Laterality Date   • ABDOMINAL SURGERY     • ANGIOPLASTY      coronary   • BREAST BIOPSY Right    • CARDIAC CATHETERIZATION     • CARDIAC CATHETERIZATION N/A 6/20/2017    Procedure: Right Heart Cath;  Surgeon: Can Kwon MD PhD;  Location: [facility] CATH INVASIVE LOCATION;  Service:    • CARDIAC CATHETERIZATION N/A 2/18/2020    Procedure: Left Heart Cath;  Surgeon: Catalina Cooper MD;  Location: [facility] CATH INVASIVE LOCATION;  Service: Cardiology;  Laterality: N/A;   • CARPAL TUNNEL RELEASE     • CHOLECYSTECTOMY     • COLONOSCOPY N/A 6/24/2020    Procedure: COLONOSCOPY;  Surgeon: Julián Maldonado MD;  Location: [facility] ENDOSCOPY;  Service: Gastroenterology;  Laterality: N/A;   • CORONARY ARTERY BYPASS GRAFT  2005   • ENDOSCOPY N/A 10/19/2018    Procedure: ESOPHAGOGASTRODUODENOSCOPY possible dilation;  Surgeon: Julián Maldonado MD;  Location: [facility] ENDOSCOPY;  Service: Gastroenterology   • ENDOSCOPY N/A 6/24/2020    Procedure: ESOPHAGOGASTRODUODENOSCOPY WED appt please;  Surgeon: Julián Maldonado MD;  Location: [facility] ENDOSCOPY;  Service: Gastroenterology;  Laterality: N/A;   • ENDOSCOPY AND COLONOSCOPY     • FOOT  SURGERY      Toes   • FOOT SURGERY     • GASTRIC BANDING      Revision, laparoscopic   • HYSTERECTOMY     • MOUTH SURGERY     • SALPINGO OOPHORECTOMY     • SHOULDER SURGERY     • SUBTALAR ARTHRODESIS Left 1/16/2019    Procedure: LEFT FOOT HARDWARE REMOVAL, FIFTH METATARSAL , OPEN REDUCTION INTERNAL FIXATION, CALCANEAL OSTEOTOMY;  Surgeon: Ignacio Lord DPM;  Location: [facility];  Service: Podiatry   • SUBTALAR ARTHRODESIS Left 10/16/2019    Procedure: foot hardware removal, subtalar joint fusion  possible de/reattachment of achilles tendon        (c-arm);  Surgeon: Ignacio Lord DPM;  Location: [facility] OR;  Service: Podiatry   • SUBTALAR ARTHRODESIS Left 9/30/2020    Procedure: subtalar, talonavicular joint arthrodesis.  Removal hardware.          (c-arm);  Surgeon: Ignacio Lord DPM;  Location: [facility];  Service: Podiatry;  Laterality: Left;   • TRANSESOPHAGEAL ECHOCARDIOGRAM (LAMONTE)      With color flow       Family History   Problem Relation Age of Onset   • Diabetes Other    • Heart disease Other    • Hypertension Other    • Heart disease Mother    • Stroke Mother    • Hypertension Mother    • Diabetes Sister    • Heart disease Sister    • Hypertension Sister    • Heart disease Sister    • Diabetes Sister    • Hypertension Sister    • Diabetes Sister    • Diabetes Sister    • Diabetes Sister    • Diabetes Sister        Social History     Socioeconomic History   • Marital status:      Spouse name: Not on file   • Number of children: Not on file   • Years of education: Not on file   • Highest education level: Not on file   Tobacco Use   • Smoking status: Never Smoker   • Smokeless tobacco: Never Used   Substance and Sexual Activity   • Alcohol use: No   • Drug use: No   • Sexual activity: Defer           Objective   Physical Exam  Vitals signs and nursing note reviewed.   Constitutional:       Appearance: She is well-developed.   HENT:      Head: Normocephalic and atraumatic.      Nose:       Right Nostril: Epistaxis present.      Mouth/Throat:      Pharynx: No pharyngeal swelling or oropharyngeal exudate.      Comments: Blood noted in the pharynx secondary to right-sided epistaxis.  Eyes:      General: No scleral icterus.        Right eye: No discharge.         Left eye: No discharge.      Conjunctiva/sclera: Conjunctivae normal.      Pupils: Pupils are equal, round, and reactive to light.   Neck:      Musculoskeletal: Normal range of motion and neck supple.      Trachea: No tracheal deviation.   Cardiovascular:      Rate and Rhythm: Normal rate and regular rhythm.      Heart sounds: Normal heart sounds. No murmur.   Pulmonary:      Effort: Pulmonary effort is normal. No respiratory distress.      Breath sounds: Normal breath sounds. No stridor. No wheezing or rales.   Abdominal:      General: Bowel sounds are normal. There is no distension.      Palpations: Abdomen is soft. There is no mass.      Tenderness: There is no abdominal tenderness. There is no guarding or rebound.   Skin:     General: Skin is warm and dry.      Findings: No erythema or rash.   Neurological:      Mental Status: She is alert and oriented to person, place, and time.      Coordination: Coordination normal.   Psychiatric:         Behavior: Behavior normal.         Thought Content: Thought content normal.         Epistaxis Management    Date/Time: 12/15/2020 9:27 AM  Performed by: Jordy Godfrey MD  Authorized by: Jordy Godfrey MD     Consent:     Consent obtained:  Verbal    Consent given by:  Patient  Anesthesia (see MAR for exact dosages):     Anesthesia method:  None  Procedure details:     Treatment site:  R anterior    Treatment method:  Nasal balloon    Treatment episode: initial    Post-procedure details:     Assessment:  Bleeding stopped    Patient tolerance of procedure:  Tolerated well, no immediate complications               ED Course  ED Course as of Dec 15 0929   Tue Dec 15, 2020   0927 Rhino Rocket  placed in the emergency department.  Epistaxis has resolved.  Blood pressure controlled.  Patient was advised to follow-up with ENT in 3 days.  She has seen Dr. Cortes in the past for her ears, but Dr. Guzman is the physician on call.  She was advised to return to the ED should her epistaxis return, otherwise, she was given clonidine to aid in treating her hypertension as needed at home.    [CB]      ED Course User Index  [CB] Jordy Godfrey MD                                           [facility]    Final diagnoses:   Epistaxis   Essential hypertension            Jordy Godfrey MD  12/15/20 0900

## 2020-12-15 NOTE — ED NOTES
Patient here for nose bleed that started 330 this am.  Patient states she has had nose bleeds off and on for 3 days.  Patient has no prior history of nose bleeds.     Nose clamp applied.  Ice pack given.       Ping Jackson RN  12/15/20 0734

## 2020-12-17 DIAGNOSIS — E78.2 MIXED HYPERLIPIDEMIA: Chronic | ICD-10-CM

## 2020-12-18 ENCOUNTER — OFFICE VISIT (OUTPATIENT)
Dept: OTOLARYNGOLOGY | Facility: CLINIC | Age: 60
End: 2020-12-18

## 2020-12-18 VITALS — HEIGHT: 68 IN | BODY MASS INDEX: 37.56 KG/M2 | TEMPERATURE: 98.6 F

## 2020-12-18 DIAGNOSIS — R04.0 ACUTE POSTERIOR EPISTAXIS: Primary | ICD-10-CM

## 2020-12-18 DIAGNOSIS — J34.2 DEVIATED NASAL SEPTUM: ICD-10-CM

## 2020-12-18 PROCEDURE — 31231 NASAL ENDOSCOPY DX: CPT | Performed by: OTOLARYNGOLOGY

## 2020-12-18 PROCEDURE — 99203 OFFICE O/P NEW LOW 30 MIN: CPT | Performed by: OTOLARYNGOLOGY

## 2020-12-18 NOTE — PROGRESS NOTES
Subjective   Ariana Martinez is a 60 y.o. female.   Severe right nosebleed  History of Present Illness   Patient is referred from emergency room for further evaluation and removal of nasal packing and she had nasal packing placed emergency room and she had acute onset right side bleeding been going on for 3 days and then became worse she had the packing placed and she is not have any bleeding she has some facial pressure congestion drainage no fever chills she is on Plavix and aspirin for other medical problems that does not have a nosebleeds on a regular basis prior to this      The following portions of the patient's history were reviewed and updated as appropriate: allergies, current medications, past family history, past medical history, past social history, past surgical history and problem list.      Ariana Martinez reports that she has never smoked. She has never used smokeless tobacco. She reports that she does not drink alcohol or use drugs.  Patient is not a tobacco user and has been counseled for use of tobacco products    Family History   Problem Relation Age of Onset   • Diabetes Other    • Heart disease Other    • Hypertension Other    • Heart disease Mother    • Stroke Mother    • Hypertension Mother    • Diabetes Sister    • Heart disease Sister    • Hypertension Sister    • Heart disease Sister    • Diabetes Sister    • Hypertension Sister    • Diabetes Sister    • Diabetes Sister    • Diabetes Sister    • Diabetes Sister          Current Outpatient Medications:   •  amoxicillin (AMOXIL) 500 MG capsule, Take 1 capsule by mouth 3 (Three) Times a Day., Disp: 30 capsule, Rfl: 0  •  ARIPiprazole (ABILIFY) 15 MG tablet, Take 1 tablet by mouth Daily., Disp: 90 tablet, Rfl: 1  •  aspirin 81 MG chewable tablet, Chew 81 mg daily., Disp: , Rfl:   •  atorvastatin (LIPITOR) 80 MG tablet, TAKE 1 TABLET BY MOUTH EVERY DAY (Patient taking differently: Take 80 mg by mouth Every Night.), Disp: 90 tablet, Rfl:  1  •  BD SHARPS CONTAINER HOME misc, 1 each Take As Directed., Disp: 1 each, Rfl: 0  •  Blood Glucose Monitoring Suppl (ONE TOUCH ULTRA MINI) w/Device kit, USE AS DIRECTED TO CHECK BLOOD SUGAR, Disp: 1 each, Rfl: 0  •  Calcium Citrate-Vitamin D (CITRACAL/VITAMIN D) 250-200 MG-UNIT tablet, Take 2 tablets by mouth 2 (two) times a day., Disp: , Rfl:   •  cloNIDine (CATAPRES) 0.1 MG tablet, Take 1 tablet by mouth 2 (Two) Times a Day. PRN systolic BP >160., Disp: 20 tablet, Rfl: 0  •  clopidogrel (PLAVIX) 75 MG tablet, TAKE 1 TABLET BY MOUTH EVERY DAY (Patient taking differently: Take 75 mg by mouth Daily.), Disp: 90 tablet, Rfl: 1  •  cyanocobalamin 1000 MCG/ML injection, INJECT 1 ML INTO THE APPROPRIATE MUSCLE AS DIRECTED BY PRESCRIBER EVERY 28 (TWENTY-EIGHT) DAYS., Disp: 3 mL, Rfl: 0  •  furosemide (LASIX) 40 MG tablet, TAKE 1 TABLET BY MOUTH EVERY DAY (Patient taking differently: Take 40 mg by mouth Daily.), Disp: 90 tablet, Rfl: 1  •  gabapentin (NEURONTIN) 400 MG capsule, Take 1 capsule by mouth 3 (Three) Times a Day., Disp: 90 capsule, Rfl: 2  •  GLUCAGON EMERGENCY 1 MG injection, USE AS DIRECTED AS NEEDED, Disp: 1 kit, Rfl: 0  •  glucose blood (Accu-Chek Iris Plus) test strip, USE TO CHECK BLOOD SUGAR 4 TIMES DAILY. ACCUCHECK, E11.9, Disp: 120 each, Rfl: 3  •  glucose monitor monitoring kit, 1 each Daily. accucheck iris meter, E11.9, Disp: 1 each, Rfl: 0  •  hydroCHLOROthiazide (HYDRODIURIL) 12.5 MG tablet, Take 12.5 mg by mouth Daily., Disp: , Rfl:   •  HYDROcodone-acetaminophen (NORCO) 7.5-325 MG per tablet, Take 1 tablet by mouth Every 6 (Six) Hours As Needed for Moderate Pain . Dose decreased 11/12/20, Disp: 120 tablet, Rfl: 0  •  Insulin Glargine (BASAGLAR KWIKPEN) 100 UNIT/ML injection pen, Inject 100 units under skin in the the appropriate area as directed every night.  (Patient taking differently: 60 Units Every Night.), Disp: 10 pen, Rfl: 2  •  Insulin Pen Needle (B-D ULTRAFINE III SHORT PEN) 31G X 8  "MM misc, USE TO INJECT 4 TIMES A DAY, Disp: 120 each, Rfl: 11  •  LORazepam (ATIVAN) 0.5 MG tablet, Take 1 tablet by mouth Daily As Needed for Anxiety., Disp: 30 tablet, Rfl: 0  •  meclizine (ANTIVERT) 25 MG tablet, Take 1 tablet by mouth 3 (Three) Times a Day As Needed for dizziness., Disp: 90 tablet, Rfl: 6  •  metoclopramide (REGLAN) 10 MG tablet, TAKE 1 TABLET BY MOUTH FOUR TIMES A DAY AS NEEDED, Disp: 120 tablet, Rfl: 1  •  metoprolol succinate XL (TOPROL-XL) 25 MG 24 hr tablet, TAKE 1 TABLET BY MOUTH EVERY DAY (Patient taking differently: Take 25 mg by mouth Daily.), Disp: 31 tablet, Rfl: 6  •  mirtazapine (REMERON) 45 MG tablet, TAKE 1 TABLET BY MOUTH EVERY NIGHT., Disp: 90 tablet, Rfl: 0  •  NOVOLOG FLEXPEN 100 UNIT/ML solution pen-injector sc pen, INJECT 60 UNITS BEFORE MEALS 3 TIMES A DAY (Patient taking differently: Inject 60 Units under the skin into the appropriate area as directed 3 (Three) Times a Day With Meals.), Disp: 162 mL, Rfl: 3  •  ondansetron (ZOFRAN) 8 MG tablet, TAKE 1 TABLET BY MOUTH EVERY 8 (EIGHT) HOURS AS NEEDED FOR NAUSEA OR VOMITING., Disp: 18 tablet, Rfl: 1  •  pantoprazole (PROTONIX) 20 MG EC tablet, Take 1 tablet by mouth Daily., Disp: 90 tablet, Rfl: 3  •  Syringe/Needle, Disp, (Luer Lock Safety Syringes) 25G X 5/8\" 3 ML misc, 1 each Daily. 1 Q28 DAYS, Disp: 1 each, Rfl: 2  •  venlafaxine XR (EFFEXOR-XR) 150 MG 24 hr capsule, Take 1 capsule by mouth 2 (Two) Times a Day., Disp: 90 capsule, Rfl: 1  •  vitamin D (ERGOCALCIFEROL) 1.25 MG (44293 UT) capsule capsule, TAKE ONE CAPSULE BY MOUTH EVERY SUNDAY. (Patient taking differently: Take 50,000 Units by mouth Every 7 (Seven) Days.), Disp: 12 capsule, Rfl: 2    Allergies   Allergen Reactions   • Seroquel [Quetiapine] Anaphylaxis   • Avandia [Rosiglitazone] Swelling   • Morphine And Related Hallucinations   • Oxycodone Hallucinations       Past Medical History:   Diagnosis Date   • Angina, class IV (CMS/HCC)    • Anxiety    • Anxiety " and depression    • Benign paroxysmal positional vertigo    • Bladder disorder     has bladder stimulator   • Carpal tunnel syndrome    • Chronic pain    • Coronary atherosclerosis     hx CABG 2005.  is followed by Dr Kwon   • Depression    • Diabetes mellitus (CMS/[facility])     Type 2, controlled   • Diabetic polyneuropathy (CMS/[facility])    • Disease of thyroid gland    • Elevated cholesterol    • Female stress incontinence    • Foot pain, left    • Full dentures    • Gastroparesis    • GERD (gastroesophageal reflux disease)    • Hyperlipidemia    • Hypertension    • Low back pain    • Malaise and fatigue    • Multiple joint pain    • Obesity     Refuses to be weighed   • Otalgia     Both   • Perforation of tympanic membrane     Left   • Postoperative wound infection    • Vitamin D deficiency    • Wears glasses     reading       Past Surgical History:   Procedure Laterality Date   • ABDOMINAL SURGERY     • ANGIOPLASTY      coronary   • BREAST BIOPSY Right    • CARDIAC CATHETERIZATION     • CARDIAC CATHETERIZATION N/A 6/20/2017    Procedure: Right Heart Cath;  Surgeon: Can Kwon MD PhD;  Location: [facility] CATH INVASIVE LOCATION;  Service:    • CARDIAC CATHETERIZATION N/A 2/18/2020    Procedure: Left Heart Cath;  Surgeon: Catalina Cooper MD;  Location: [facility] CATH INVASIVE LOCATION;  Service: Cardiology;  Laterality: N/A;   • CARPAL TUNNEL RELEASE     • CHOLECYSTECTOMY     • COLONOSCOPY N/A 6/24/2020    Procedure: COLONOSCOPY;  Surgeon: Julián Maldonado MD;  Location: [facility] ENDOSCOPY;  Service: Gastroenterology;  Laterality: N/A;   • CORONARY ARTERY BYPASS GRAFT  2005   • ENDOSCOPY N/A 10/19/2018    Procedure: ESOPHAGOGASTRODUODENOSCOPY possible dilation;  Surgeon: Julián Maldonado MD;  Location: [facility] ENDOSCOPY;  Service: Gastroenterology   • ENDOSCOPY N/A 6/24/2020    Procedure: ESOPHAGOGASTRODUODENOSCOPY WED appt please;  Surgeon: Julián Maldonado MD;  Location: [facility] ENDOSCOPY;  Service:  Gastroenterology;  Laterality: N/A;   • ENDOSCOPY AND COLONOSCOPY     • FOOT SURGERY      Toes   • FOOT SURGERY     • GASTRIC BANDING      Revision, laparoscopic   • HYSTERECTOMY     • MOUTH SURGERY     • SALPINGO OOPHORECTOMY     • SHOULDER SURGERY     • SUBTALAR ARTHRODESIS Left 1/16/2019    Procedure: LEFT FOOT HARDWARE REMOVAL, FIFTH METATARSAL , OPEN REDUCTION INTERNAL FIXATION, CALCANEAL OSTEOTOMY;  Surgeon: Ignacio Lord DPM;  Location: [facility];  Service: Podiatry   • SUBTALAR ARTHRODESIS Left 10/16/2019    Procedure: foot hardware removal, subtalar joint fusion  possible de/reattachment of achilles tendon        (c-arm);  Surgeon: Ignacio Lord DPM;  Location: [facility] OR;  Service: Podiatry   • SUBTALAR ARTHRODESIS Left 9/30/2020    Procedure: subtalar, talonavicular joint arthrodesis.  Removal hardware.          (c-arm);  Surgeon: Ignacio Lord DPM;  Location: [facility];  Service: Podiatry;  Laterality: Left;   • TRANSESOPHAGEAL ECHOCARDIOGRAM (LAMONTE)      With color flow       Review of Systems   Constitutional: Negative for fever.   HENT: Positive for nosebleeds, postnasal drip, rhinorrhea and sinus pressure. Negative for facial swelling.    Skin: Negative.    Neurological: Negative for facial asymmetry.   Hematological: Negative for adenopathy.   Psychiatric/Behavioral: Negative.            Objective   Physical Exam  Vitals signs and nursing note reviewed.   HENT:      Head: Normocephalic.        Right Ear: Tympanic membrane and ear canal normal.      Left Ear: Tympanic membrane and ear canal normal.      Nose: Nasal tenderness present. No nasal deformity.      Right Nostril: No septal hematoma.      Left Nostril: No septal hematoma.      Right Turbinates: Not enlarged.      Right Sinus: No maxillary sinus tenderness or frontal sinus tenderness.      Left Sinus: No maxillary sinus tenderness or frontal sinus tenderness.        Comments: Right packing in place dry no blood      Mouth/Throat:      Mouth: Mucous membranes are moist.      Pharynx: Oropharynx is clear.   Eyes:      Conjunctiva/sclera: Conjunctivae normal.   Pulmonary:      Effort: Pulmonary effort is normal.   Musculoskeletal:      Comments: Cast on left lower extremity   Lymphadenopathy:      Cervical: No cervical adenopathy.   Skin:     General: Skin is warm.   Neurological:      General: No focal deficit present.      Mental Status: She is alert.   Psychiatric:         Mood and Affect: Mood normal.           Nasal endoscopy procedure note nose was decongested after the packing was removed the nose evaluated to the posterior nasopharynx the patient has a deviated septum no purulence no pus no polyps no clots no blood no mass lesion or obvious source of bleeding she tolerated well there are no complications    Assessment/Plan   Diagnoses and all orders for this visit:    1. Acute posterior epistaxis (Primary)    2. Deviated nasal septum      Have given her instruction sheets and information on nasal irrigation since there is no evidence of any mass lesion or tumor and she is not having generally trouble breathing through her nose except when the packing is in and did not have frequent nosebleeds we will see her as needed I told her if her symptoms come to annual need to consider take her off the anticoagulants and can get a CT and other studies but on nasal endoscopy there is no evidence of mass lesion or tumor is most likely related to drying of the air and being on anticoagulants  Talked at length about new medication using saline explained her how to use it she is to call me question problem we will see her as needed

## 2020-12-18 NOTE — PATIENT INSTRUCTIONS
MyPlate from USDA    MyPlate is an outline of a general healthy diet based on the 2010 Dietary Guidelines for Americans, from the U.[place]. Department of Agriculture (USDA). It sets guidelines for how much food you should eat from each food group based on your age, sex, and level of physical activity.  What are tips for following MyPlate?  To follow MyPlate recommendations:  · Eat a wide variety of fruits and vegetables, grains, and protein foods.  · Serve smaller portions and eat less food throughout the day.  · Limit portion sizes to avoid overeating.  · Enjoy your food.  · Get at least 150 minutes of exercise every week. This is about 30 minutes each day, 5 or more days per week.  It can be difficult to have every meal look like MyPlate. Think about MyPlate as eating guidelines for an entire day, rather than each individual meal.  Fruits and vegetables  · Make half of your plate fruits and vegetables.  · Eat many different colors of fruits and vegetables each day.  · For a 2,000 calorie daily food plan, eat:  ? 2½ cups of vegetables every day.  ? 2 cups of fruit every day.  · 1 cup is equal to:  ? 1 cup raw or cooked vegetables.  ? 1 cup raw fruit.  ? 1 medium-sized orange, apple, or banana.  ? 1 cup 100% fruit or vegetable juice.  ? 2 cups raw leafy greens, such as lettuce, spinach, or kale.  ? ½ cup dried fruit.  Grains  · One fourth of your plate should be grains.  · Make at least half of the grains you eat each day whole grains.  · For a 2,000 calorie daily food plan, eat 6 oz of grains every day.  · 1 oz is equal to:  ? 1 slice bread.  ? 1 cup cereal.  ? ½ cup cooked rice, cereal, or pasta.  Protein  · One fourth of your plate should be protein.  · Eat a wide variety of protein foods, including meat, poultry, fish, eggs, beans, nuts, and tofu.  · For a 2,000 calorie daily food plan, eat 5½ oz of protein every day.  · 1 oz is equal to:  ? 1 oz meat, poultry, or fish.  ? ¼ cup cooked beans.  ? 1 egg.  ? ½ oz nuts  or seeds.  ? 1 Tbsp peanut butter.  Dairy  · Drink fat-free or low-fat (1%) milk.  · Eat or drink dairy as a side to meals.  · For a 2,000 calorie daily food plan, eat or drink 3 cups of dairy every day.  · 1 cup is equal to:  ? 1 cup milk, yogurt, cottage cheese, or soy milk (soy beverage).  ? 2 oz processed cheese.  ? 1½ oz natural cheese.  Fats, oils, salt, and sugars  · Only small amounts of oils are recommended.  · Avoid foods that are high in calories and low in nutritional value (empty calories), like foods high in fat or added sugars.  · Choose foods that are low in salt (sodium). Choose foods that have less than 140 milligrams (mg) of sodium per serving.  · Drink water instead of sugary drinks. Drink enough water each day to keep your urine pale yellow.  Where to find support  · Work with your health care provider or a nutrition specialist (dietitian) to develop a customized eating plan that is right for you.  · Download an cuong (mobile application) to help you track your daily food intake.  Where to find more information  · Go to ChooseMyPlate.gov for more information.  Summary  · MyPlate is a general guideline for healthy eating from the USDA. It is based on the 2010 Dietary Guidelines for Americans.  · In general, fruits and vegetables should take up ½ of your plate, grains should take up ¼ of your plate, and protein should take up ¼ of your plate.  This information is not intended to replace advice given to you by your health care provider. Make sure you discuss any questions you have with your health care provider.  Document Revised: 05/21/2020 Document Reviewed: 03/19/2018  Elsevier Patient Education © 2020 [employer] Inc.

## 2020-12-21 ENCOUNTER — OFFICE VISIT (OUTPATIENT)
Dept: PODIATRY | Facility: CLINIC | Age: 60
End: 2020-12-21

## 2020-12-21 VITALS — BODY MASS INDEX: 37.44 KG/M2 | HEIGHT: 68 IN | HEART RATE: 89 BPM | WEIGHT: 247 LBS | OXYGEN SATURATION: 99 %

## 2020-12-21 DIAGNOSIS — Q66.70 PES CAVUS: ICD-10-CM

## 2020-12-21 DIAGNOSIS — M96.0 NONUNION OF SUBTALAR ARTHRODESIS: Primary | ICD-10-CM

## 2020-12-21 DIAGNOSIS — M21.172 SUBTALAR VARUS, ACQUIRED, LEFT: ICD-10-CM

## 2020-12-21 PROCEDURE — 99024 POSTOP FOLLOW-UP VISIT: CPT | Performed by: PODIATRIST

## 2020-12-21 NOTE — PROGRESS NOTES
Ariana Luis Martinez  1960  60 y.o. female   PCP: Kimberly Ferguson 11/12/2020  BS - 158 per pt.    Patient presents today for post op appointment of left foot.     12/21/2020      Chief Complaint   Patient presents with   • Left Foot - Post-op Follow-up       History of Present Illness    Ms Martinez is a 59 y/o diabetic female who presents for follow-up of left subtalar joint and talonavicular joint arthrodesis.  Date of surgery 9/30/2020.  She is doing well overall with no postoperative pain.  She has remained nonweightbearing.    Past Medical History:   Diagnosis Date   • Angina, class IV (CMS/HCC)    • Anxiety    • Anxiety and depression    • Benign paroxysmal positional vertigo    • Bladder disorder     has bladder stimulator   • Carpal tunnel syndrome    • Chronic pain    • Coronary atherosclerosis     hx CABG 2005.  is followed by Dr Kwon   • Depression    • Diabetes mellitus (CMS/HCC)     Type 2, controlled   • Diabetic polyneuropathy (CMS/HCC)    • Disease of thyroid gland    • Elevated cholesterol    • Female stress incontinence    • Foot pain, left    • Full dentures    • Gastroparesis    • GERD (gastroesophageal reflux disease)    • Hyperlipidemia    • Hypertension    • Low back pain    • Malaise and fatigue    • Multiple joint pain    • Obesity     Refuses to be weighed   • Otalgia     Both   • Perforation of tympanic membrane     Left   • Postoperative wound infection    • Vitamin D deficiency    • Wears glasses     reading         Past Surgical History:   Procedure Laterality Date   • ABDOMINAL SURGERY     • ANGIOPLASTY      coronary   • BREAST BIOPSY Right    • CARDIAC CATHETERIZATION     • CARDIAC CATHETERIZATION N/A 6/20/2017    Procedure: Right Heart Cath;  Surgeon: Can Kwon MD PhD;  Location: [facility] INVASIVE LOCATION;  Service:    • CARDIAC CATHETERIZATION N/A 2/18/2020    Procedure: Left Heart Cath;  Surgeon: Catalina Cooper MD;  Location: [facility] INVASIVE LOCATION;   Service: Cardiology;  Laterality: N/A;   • CARPAL TUNNEL RELEASE     • CHOLECYSTECTOMY     • COLONOSCOPY N/A 6/24/2020    Procedure: COLONOSCOPY;  Surgeon: Julián Maldonado MD;  Location: [facility] ENDOSCOPY;  Service: Gastroenterology;  Laterality: N/A;   • CORONARY ARTERY BYPASS GRAFT  2005   • ENDOSCOPY N/A 10/19/2018    Procedure: ESOPHAGOGASTRODUODENOSCOPY possible dilation;  Surgeon: Julián Maldonado MD;  Location: [facility] ENDOSCOPY;  Service: Gastroenterology   • ENDOSCOPY N/A 6/24/2020    Procedure: ESOPHAGOGASTRODUODENOSCOPY WED appt please;  Surgeon: Julián Maldonado MD;  Location: [facility] ENDOSCOPY;  Service: Gastroenterology;  Laterality: N/A;   • ENDOSCOPY AND COLONOSCOPY     • FOOT SURGERY      Toes   • FOOT SURGERY     • GASTRIC BANDING      Revision, laparoscopic   • HYSTERECTOMY     • MOUTH SURGERY     • SALPINGO OOPHORECTOMY     • SHOULDER SURGERY     • SUBTALAR ARTHRODESIS Left 1/16/2019    Procedure: LEFT FOOT HARDWARE REMOVAL, FIFTH METATARSAL , OPEN REDUCTION INTERNAL FIXATION, CALCANEAL OSTEOTOMY;  Surgeon: Ignacio Lord DPM;  Location: [facility] OR;  Service: Podiatry   • SUBTALAR ARTHRODESIS Left 10/16/2019    Procedure: foot hardware removal, subtalar joint fusion  possible de/reattachment of achilles tendon        (c-arm);  Surgeon: Ignacio Lord DPM;  Location: [facility] OR;  Service: Podiatry   • SUBTALAR ARTHRODESIS Left 9/30/2020    Procedure: subtalar, talonavicular joint arthrodesis.  Removal hardware.          (c-arm);  Surgeon: Ignacio Lord DPM;  Location: [facility] OR;  Service: Podiatry;  Laterality: Left;   • TRANSESOPHAGEAL ECHOCARDIOGRAM (LAMONTE)      With color flow         Family History   Problem Relation Age of Onset   • Diabetes Other    • Heart disease Other    • Hypertension Other    • Heart disease Mother    • Stroke Mother    • Hypertension Mother    • Diabetes Sister    • Heart disease Sister    • Hypertension Sister    • Heart disease Sister    • Diabetes Sister     • Hypertension Sister    • Diabetes Sister    • Diabetes Sister    • Diabetes Sister    • Diabetes Sister          Social History     Socioeconomic History   • Marital status:      Spouse name: Not on file   • Number of children: Not on file   • Years of education: Not on file   • Highest education level: Not on file   Tobacco Use   • Smoking status: Never Smoker   • Smokeless tobacco: Never Used   Substance and Sexual Activity   • Alcohol use: No   • Drug use: No   • Sexual activity: Defer         Current Outpatient Medications   Medication Sig Dispense Refill   • amoxicillin (AMOXIL) 500 MG capsule Take 1 capsule by mouth 3 (Three) Times a Day. 30 capsule 0   • ARIPiprazole (ABILIFY) 15 MG tablet Take 1 tablet by mouth Daily. 90 tablet 1   • aspirin 81 MG chewable tablet Chew 81 mg daily.     • atorvastatin (LIPITOR) 80 MG tablet TAKE 1 TABLET BY MOUTH EVERY DAY (Patient taking differently: Take 80 mg by mouth Every Night.) 90 tablet 1   • BD SHARPS CONTAINER HOME misc 1 each Take As Directed. 1 each 0   • Blood Glucose Monitoring Suppl (ONE TOUCH ULTRA MINI) w/Device kit USE AS DIRECTED TO CHECK BLOOD SUGAR 1 each 0   • Calcium Citrate-Vitamin D (CITRACAL/VITAMIN D) 250-200 MG-UNIT tablet Take 2 tablets by mouth 2 (two) times a day.     • cloNIDine (CATAPRES) 0.1 MG tablet Take 1 tablet by mouth 2 (Two) Times a Day. PRN systolic BP >160. 20 tablet 0   • clopidogrel (PLAVIX) 75 MG tablet TAKE 1 TABLET BY MOUTH EVERY DAY (Patient taking differently: Take 75 mg by mouth Daily.) 90 tablet 1   • cyanocobalamin 1000 MCG/ML injection INJECT 1 ML INTO THE APPROPRIATE MUSCLE AS DIRECTED BY PRESCRIBER EVERY 28 (TWENTY-EIGHT) DAYS. 3 mL 0   • furosemide (LASIX) 40 MG tablet TAKE 1 TABLET BY MOUTH EVERY DAY (Patient taking differently: Take 40 mg by mouth Daily.) 90 tablet 1   • gabapentin (NEURONTIN) 400 MG capsule Take 1 capsule by mouth 3 (Three) Times a Day. 90 capsule 2   • GLUCAGON EMERGENCY 1 MG injection USE  "AS DIRECTED AS NEEDED 1 kit 0   • glucose blood (Accu-Chek Iris Plus) test strip USE TO CHECK BLOOD SUGAR 4 TIMES DAILY. ACCUCHECK, E11.9 120 each 3   • glucose monitor monitoring kit 1 each Daily. accucheck iris meter, E11.9 1 each 0   • hydroCHLOROthiazide (HYDRODIURIL) 12.5 MG tablet Take 12.5 mg by mouth Daily.     • HYDROcodone-acetaminophen (NORCO) 7.5-325 MG per tablet Take 1 tablet by mouth Every 6 (Six) Hours As Needed for Moderate Pain . Dose decreased 11/12/20 120 tablet 0   • Insulin Glargine (BASAGLAR KWIKPEN) 100 UNIT/ML injection pen Inject 100 units under skin in the the appropriate area as directed every night.  (Patient taking differently: 60 Units Every Night.) 10 pen 2   • Insulin Pen Needle (B-D ULTRAFINE III SHORT PEN) 31G X 8 MM misc USE TO INJECT 4 TIMES A  each 11   • LORazepam (ATIVAN) 0.5 MG tablet Take 1 tablet by mouth Daily As Needed for Anxiety. 30 tablet 0   • meclizine (ANTIVERT) 25 MG tablet Take 1 tablet by mouth 3 (Three) Times a Day As Needed for dizziness. 90 tablet 6   • metoclopramide (REGLAN) 10 MG tablet TAKE 1 TABLET BY MOUTH FOUR TIMES A DAY AS NEEDED 120 tablet 1   • metoprolol succinate XL (TOPROL-XL) 25 MG 24 hr tablet TAKE 1 TABLET BY MOUTH EVERY DAY (Patient taking differently: Take 25 mg by mouth Daily.) 31 tablet 6   • mirtazapine (REMERON) 45 MG tablet TAKE 1 TABLET BY MOUTH EVERY NIGHT. 90 tablet 0   • NOVOLOG FLEXPEN 100 UNIT/ML solution pen-injector sc pen INJECT 60 UNITS BEFORE MEALS 3 TIMES A DAY (Patient taking differently: Inject 60 Units under the skin into the appropriate area as directed 3 (Three) Times a Day With Meals.) 162 mL 3   • ondansetron (ZOFRAN) 8 MG tablet TAKE 1 TABLET BY MOUTH EVERY 8 (EIGHT) HOURS AS NEEDED FOR NAUSEA OR VOMITING. 18 tablet 1   • pantoprazole (PROTONIX) 20 MG EC tablet Take 1 tablet by mouth Daily. 90 tablet 3   • Syringe/Needle, Disp, (Luer Lock Safety Syringes) 25G X 5/8\" 3 ML misc 1 each Daily. 1 Q28 DAYS 1 each " "2   • venlafaxine XR (EFFEXOR-XR) 150 MG 24 hr capsule Take 1 capsule by mouth 2 (Two) Times a Day. 90 capsule 1   • vitamin D (ERGOCALCIFEROL) 1.25 MG (27828 UT) capsule capsule TAKE ONE CAPSULE BY MOUTH EVERY SUNDAY. (Patient taking differently: Take 50,000 Units by mouth Every 7 (Seven) Days.) 12 capsule 2     No current facility-administered medications for this visit.      Review of Systems   Constitutional: Negative.    HENT: Negative.    Eyes: Negative.    Respiratory: Negative.    Cardiovascular: Negative.    Gastrointestinal: Negative.    Endocrine: Negative.    Genitourinary: Negative.    Musculoskeletal: Negative.         Left foot pain    Skin: Negative.    Allergic/Immunologic: Negative.    Neurological: Positive for numbness.   Hematological: Negative.    Psychiatric/Behavioral: Negative.          OBJECTIVE    Pulse 89   Ht 172.7 cm (68\")   Wt 112 kg (247 lb)   SpO2 99%   BMI 37.56 kg/m²       Physical Exam   Constitutional: she appears well-developed and well-nourished.   HEENT: Normocephalic. Atraumatic.  CV: No CP. RRR  Resp: Non-labored respirations.  Psychiatric: she has a normal mood and affect. her behavior is normal.           Lower Extremity Exam:  Pulses palpable.  Capillary fill time within normal limits.  Mild  left foot and ankle edema.  No ecchymosis or erythema.  Left foot incisions well-healed, no signs of infection  Subtalar, talonavicular joint rectus, rigid. Moderate residual pes cavus  No TTP      Procedures         ASSESSMENT AND PLAN    Diagnoses and all orders for this visit:    1. Nonunion of subtalar arthrodesis (Primary)  -     XR Foot 3+ View Left    2. Pes cavus    3. Subtalar varus, acquired, left        -Patient doing well postoperatively  -Radiographs ordered reviewed.  Alignment is maintained with no interval issues, continued integration of subtalar joint bone graft.    -We will begin transition to tall cam boot, increase weightbearing with assistance of " walker.  -Restart bone stimulator  -Recheck 3 weeks            This document has been electronically signed by Ignacio Lord DPM on December 21, 2020 12:21 CST

## 2020-12-22 ENCOUNTER — OFFICE VISIT (OUTPATIENT)
Dept: ONCOLOGY | Facility: CLINIC | Age: 60
End: 2020-12-22

## 2020-12-22 DIAGNOSIS — D50.0 IRON DEFICIENCY ANEMIA DUE TO CHRONIC BLOOD LOSS: Primary | ICD-10-CM

## 2020-12-22 PROCEDURE — 99441 PR PHYS/QHP TELEPHONE EVALUATION 5-10 MIN: CPT | Performed by: NURSE PRACTITIONER

## 2020-12-22 RX ORDER — FOLIC ACID 1 MG/1
1 TABLET ORAL DAILY
Qty: 90 TABLET | Refills: 1 | Status: SHIPPED | OUTPATIENT
Start: 2020-12-22 | End: 2021-04-22 | Stop reason: SDUPTHER

## 2020-12-22 RX ORDER — ATORVASTATIN CALCIUM 80 MG/1
TABLET, FILM COATED ORAL
Qty: 90 TABLET | Refills: 1 | Status: SHIPPED | OUTPATIENT
Start: 2020-12-22 | End: 2021-06-14

## 2020-12-22 NOTE — PROGRESS NOTES
12/22/2020  You have chosen to receive care through a telephone visit. Do you consent to use a telephone visit for your medical care today? Yes    This visit has been rescheduled as a phone visit to comply with patient safety concerns in accordance with CDC recommendations. Total time of discussion was 10 minutes.    Diagnosis: iron deficiency anemia     HISTORY OF PRESENT ILLNESS:  60-year-old female with medical problem consisting of coronary artery disease status post CABG, type 2 diabetes mellitus with neuropathy affecting upper and lower extremity, gastroparesis, dyslipidemia, obesity status post lap banding and reversal around 2014 was initially seen in consultation on September 30, 2019 for evaluation of leukocytosis and thrombocytosis.  Due to iron deficiency and inability to tolerate iron by mouth, patient received 2 dose of intravenous Feraheme on October 23, 2019 and October 30, 2019.    Recently seen in May and iron stores were dropping again and patient was given 2 additional doses of IV iron on 6/4/2020 and 6/12/2020. She also had upper and lower endoscopies 6/24/2020 with no identifiable source of bleeding.        REVIEW OF SYSTEMS:       CONSTITUTIONAL:  No fever, chills, or night sweats.      HEENT:  No epistaxis, mouth sores, or difficulty swallowing.     RESPIRATORY:  No new shortness of breath or cough at present.     CARDIOVASCULAR:  No chest pain or palpitations.     GASTROINTESTINAL:  No abdominal pain, nausea, vomiting, or blood in the stool.     GENITOURINARY:  No dysuria or hematuria.     MUSCULOSKELETAL:  No any new back pain or arthralgias.      NEUROLOGICAL:  No tingling or numbness. No new headache or dizziness.      LYMPHATICS:  Denies any abnormal swollen and anywhere in the body.     SKIN:  Denies any new skin rash.    1.  Thrombocytosis:  - Patient has intermittent thrombocytosis since 2015.  -Platelet count done today 12/15/2020 is normal at 337,000.  - Extensive work-up including  Tony 2 mutation with exon 12, CALR mutation, MPL mutation done on September 30, 2019 is negative.  - At this point will continue with clinical monitoring.  Will ask patient to return to clinic in 4 months with repeat CBC and anemia work-up to be done     2.  Leukocytosis:  - Patient has persistent leukocytosis since 2015.  -White blood cell count is stable at 10.0000..  - CANDICE R mutation, MPL mutation, Tony 2 mutation including exon 12 were negative.  At this point will continue with clinical monitoring.  - If patient has any worsening leukocytosis or thrombocytosis in future, she will need bone marrow biopsy which was discussed with patient.     3.  Iron deficiency:  -Patient is found to have iron deficiency with iron saturation of only 11% and ferritin of 46.  Patient states in the past she is not able to tolerate iron by mouth.  -Patient has received multiple doses of IV iron; most recently in June 2020 with dropping iron stores.  -Hgb today is 13.8 with adequate iron stores.  -continue with  monthly B12 injection by primary medical provider.  -will send prescription for folic acid 1 mg daily  -will recheck labs again in 4 mos.

## 2020-12-23 DIAGNOSIS — E53.8 CYANOCOBALAMIN DEFICIENCY: ICD-10-CM

## 2020-12-23 RX ORDER — CYANOCOBALAMIN 1000 UG/ML
1000 INJECTION, SOLUTION INTRAMUSCULAR; SUBCUTANEOUS
Qty: 3 ML | Refills: 0 | Status: SHIPPED | OUTPATIENT
Start: 2020-12-23 | End: 2021-05-05

## 2020-12-23 RX ORDER — CLOPIDOGREL BISULFATE 75 MG/1
75 TABLET ORAL DAILY
Qty: 30 TABLET | Refills: 5 | Status: SHIPPED | OUTPATIENT
Start: 2020-12-23 | End: 2021-06-14

## 2020-12-23 RX ORDER — PANTOPRAZOLE SODIUM 20 MG/1
TABLET, DELAYED RELEASE ORAL
Qty: 90 TABLET | Refills: 3 | Status: SHIPPED | OUTPATIENT
Start: 2020-12-23 | End: 2021-07-19 | Stop reason: SDUPTHER

## 2020-12-27 RX ORDER — PEN NEEDLE, DIABETIC 31 GX5/16"
NEEDLE, DISPOSABLE MISCELLANEOUS
Qty: 120 EACH | Refills: 11 | Status: SHIPPED | OUTPATIENT
Start: 2020-12-27 | End: 2021-08-26 | Stop reason: SDUPTHER

## 2021-01-04 RX ORDER — ONDANSETRON HYDROCHLORIDE 8 MG/1
8 TABLET, FILM COATED ORAL EVERY 8 HOURS PRN
Qty: 18 TABLET | Refills: 1 | Status: ON HOLD | OUTPATIENT
Start: 2021-01-04 | End: 2021-07-12 | Stop reason: SDUPTHER

## 2021-01-11 ENCOUNTER — TELEPHONE (OUTPATIENT)
Dept: ENDOCRINOLOGY | Facility: CLINIC | Age: 61
End: 2021-01-11

## 2021-01-11 DIAGNOSIS — G89.29 CHRONIC FOOT PAIN, LEFT: ICD-10-CM

## 2021-01-11 DIAGNOSIS — M79.672 CHRONIC FOOT PAIN, LEFT: ICD-10-CM

## 2021-01-11 DIAGNOSIS — M54.41 CHRONIC RIGHT-SIDED LOW BACK PAIN WITH RIGHT-SIDED SCIATICA: Chronic | ICD-10-CM

## 2021-01-11 DIAGNOSIS — G89.29 CHRONIC RIGHT-SIDED LOW BACK PAIN WITH RIGHT-SIDED SCIATICA: Chronic | ICD-10-CM

## 2021-01-11 NOTE — TELEPHONE ENCOUNTER
Irina with [facility] called stating the visit with PT on 12/9/20 was a telehealth and PT's insurance no longer covers that type of visit as of 10/1/2020 and would like to have the visit re-billed as in person visit.    Thank you

## 2021-01-12 ENCOUNTER — OFFICE VISIT (OUTPATIENT)
Dept: PODIATRY | Facility: CLINIC | Age: 61
End: 2021-01-12

## 2021-01-12 ENCOUNTER — TELEPHONE (OUTPATIENT)
Dept: FAMILY MEDICINE CLINIC | Facility: CLINIC | Age: 61
End: 2021-01-12

## 2021-01-12 VITALS — HEIGHT: 68 IN | WEIGHT: 247 LBS | BODY MASS INDEX: 37.44 KG/M2 | HEART RATE: 94 BPM | OXYGEN SATURATION: 99 %

## 2021-01-12 DIAGNOSIS — E11.42 DIABETIC POLYNEUROPATHY ASSOCIATED WITH TYPE 2 DIABETES MELLITUS (HCC): ICD-10-CM

## 2021-01-12 DIAGNOSIS — R26.81 GAIT INSTABILITY: ICD-10-CM

## 2021-01-12 DIAGNOSIS — Q66.70 PES CAVUS: ICD-10-CM

## 2021-01-12 DIAGNOSIS — M96.0 NONUNION OF SUBTALAR ARTHRODESIS: Primary | ICD-10-CM

## 2021-01-12 PROCEDURE — 99213 OFFICE O/P EST LOW 20 MIN: CPT | Performed by: PODIATRIST

## 2021-01-12 RX ORDER — HYDROCODONE BITARTRATE AND ACETAMINOPHEN 7.5; 325 MG/1; MG/1
1 TABLET ORAL EVERY 6 HOURS PRN
Qty: 120 TABLET | Refills: 0 | Status: SHIPPED | OUTPATIENT
Start: 2021-01-12 | End: 2021-02-12 | Stop reason: SDUPTHER

## 2021-01-12 NOTE — PROGRESS NOTES
Ariana Martinez  1960  61 y.o. female   DANIELA Gamboa - 12/09/2020  BS - 143 per pt.    Patient presents today for post op appointment of her left foot.     01/12/2021      Chief Complaint   Patient presents with   • Left Foot - Follow-up, post op recheck       History of Present Illness    Ms Martinez is a 59 y/o diabetic female who presents for follow-up of left subtalar joint and talonavicular joint arthrodesis.  Date of surgery 9/30/2020.  She is doing well overall with no postoperative pain.  At last visit we did transition her into a cam boot and have increased her weightbearing as tolerated.  She has tolerated this transition well.    Past Medical History:   Diagnosis Date   • Angina, class IV (CMS/HCC)    • Anxiety    • Anxiety and depression    • Benign paroxysmal positional vertigo    • Bladder disorder     has bladder stimulator   • Carpal tunnel syndrome    • Chronic pain    • Coronary atherosclerosis     hx CABG 2005.  is followed by Dr Kwon   • Depression    • Diabetes mellitus (CMS/HCC)     Type 2, controlled   • Diabetic polyneuropathy (CMS/HCC)    • Disease of thyroid gland    • Elevated cholesterol    • Female stress incontinence    • Foot pain, left    • Full dentures    • Gastroparesis    • GERD (gastroesophageal reflux disease)    • Hyperlipidemia    • Hypertension    • Low back pain    • Malaise and fatigue    • Multiple joint pain    • Obesity     Refuses to be weighed   • Otalgia     Both   • Perforation of tympanic membrane     Left   • Postoperative wound infection    • Vitamin D deficiency    • Wears glasses     reading         Past Surgical History:   Procedure Laterality Date   • ABDOMINAL SURGERY     • ANGIOPLASTY      coronary   • BREAST BIOPSY Right    • CARDIAC CATHETERIZATION     • CARDIAC CATHETERIZATION N/A 6/20/2017    Procedure: Right Heart Cath;  Surgeon: Can Kwon MD PhD;  Location: [facility] CATH INVASIVE LOCATION;  Service:    • CARDIAC CATHETERIZATION N/A  2/18/2020    Procedure: Left Heart Cath;  Surgeon: Catalina Cooper MD;  Location: [facility] CATH INVASIVE LOCATION;  Service: Cardiology;  Laterality: N/A;   • CARPAL TUNNEL RELEASE     • CHOLECYSTECTOMY     • COLONOSCOPY N/A 6/24/2020    Procedure: COLONOSCOPY;  Surgeon: Julián Maldonado MD;  Location: [facility] ENDOSCOPY;  Service: Gastroenterology;  Laterality: N/A;   • CORONARY ARTERY BYPASS GRAFT  2005   • ENDOSCOPY N/A 10/19/2018    Procedure: ESOPHAGOGASTRODUODENOSCOPY possible dilation;  Surgeon: Julián Maldonado MD;  Location: [facility] ENDOSCOPY;  Service: Gastroenterology   • ENDOSCOPY N/A 6/24/2020    Procedure: ESOPHAGOGASTRODUODENOSCOPY WED appt please;  Surgeon: Julián Maldonado MD;  Location: [facility] ENDOSCOPY;  Service: Gastroenterology;  Laterality: N/A;   • ENDOSCOPY AND COLONOSCOPY     • FOOT SURGERY      Toes   • FOOT SURGERY     • GASTRIC BANDING      Revision, laparoscopic   • HYSTERECTOMY     • MOUTH SURGERY     • SALPINGO OOPHORECTOMY     • SHOULDER SURGERY     • SUBTALAR ARTHRODESIS Left 1/16/2019    Procedure: LEFT FOOT HARDWARE REMOVAL, FIFTH METATARSAL , OPEN REDUCTION INTERNAL FIXATION, CALCANEAL OSTEOTOMY;  Surgeon: Ignacio Lord DPM;  Location: [facility] OR;  Service: Podiatry   • SUBTALAR ARTHRODESIS Left 10/16/2019    Procedure: foot hardware removal, subtalar joint fusion  possible de/reattachment of achilles tendon        (c-arm);  Surgeon: Ignacio Lord DPM;  Location: [facility] OR;  Service: Podiatry   • SUBTALAR ARTHRODESIS Left 9/30/2020    Procedure: subtalar, talonavicular joint arthrodesis.  Removal hardware.          (c-arm);  Surgeon: Ignacio Lord DPM;  Location: [facility] OR;  Service: Podiatry;  Laterality: Left;   • TRANSESOPHAGEAL ECHOCARDIOGRAM (LAMONTE)      With color flow         Family History   Problem Relation Age of Onset   • Diabetes Other    • Heart disease Other    • Hypertension Other    • Heart disease Mother    • Stroke Mother    • Hypertension Mother     • Diabetes Sister    • Heart disease Sister    • Hypertension Sister    • Heart disease Sister    • Diabetes Sister    • Hypertension Sister    • Diabetes Sister    • Diabetes Sister    • Diabetes Sister    • Diabetes Sister          Social History     Socioeconomic History   • Marital status:      Spouse name: Not on file   • Number of children: Not on file   • Years of education: Not on file   • Highest education level: Not on file   Tobacco Use   • Smoking status: Never Smoker   • Smokeless tobacco: Never Used   Substance and Sexual Activity   • Alcohol use: No   • Drug use: No   • Sexual activity: Defer         Current Outpatient Medications   Medication Sig Dispense Refill   • amoxicillin (AMOXIL) 500 MG capsule Take 1 capsule by mouth 3 (Three) Times a Day. 30 capsule 0   • ARIPiprazole (ABILIFY) 15 MG tablet Take 1 tablet by mouth Daily. 90 tablet 1   • aspirin 81 MG chewable tablet Chew 81 mg daily.     • atorvastatin (LIPITOR) 80 MG tablet TAKE 1 TABLET BY MOUTH EVERY DAY 90 tablet 1   • BD SHARPS CONTAINER HOME misc 1 each Take As Directed. 1 each 0   • Blood Glucose Monitoring Suppl (ONE TOUCH ULTRA MINI) w/Device kit USE AS DIRECTED TO CHECK BLOOD SUGAR 1 each 0   • Calcium Citrate-Vitamin D (CITRACAL/VITAMIN D) 250-200 MG-UNIT tablet Take 2 tablets by mouth 2 (two) times a day.     • cloNIDine (CATAPRES) 0.1 MG tablet Take 1 tablet by mouth 2 (Two) Times a Day. PRN systolic BP >160. 20 tablet 0   • clopidogrel (PLAVIX) 75 MG tablet Take 1 tablet by mouth Daily. 30 tablet 5   • cyanocobalamin 1000 MCG/ML injection INJECT 1 ML INTO THE APPROPRIATE MUSCLE AS DIRECTED BY PRESCRIBER EVERY 28 (TWENTY-EIGHT) DAYS. 3 mL 0   • folic acid (FOLVITE) 1 MG tablet Take 1 tablet by mouth Daily. 90 tablet 1   • furosemide (LASIX) 40 MG tablet TAKE 1 TABLET BY MOUTH EVERY DAY (Patient taking differently: Take 40 mg by mouth Daily.) 90 tablet 1   • gabapentin (NEURONTIN) 400 MG capsule Take 1 capsule by mouth 3  (Three) Times a Day. 90 capsule 2   • GLUCAGON EMERGENCY 1 MG injection USE AS DIRECTED AS NEEDED 1 kit 0   • glucose blood (Accu-Chek Iris Plus) test strip USE TO CHECK BLOOD SUGAR 4 TIMES DAILY. ACCUCHECK, E11.9 120 each 3   • glucose monitor monitoring kit 1 each Daily. accucheck iris meter, E11.9 1 each 0   • hydroCHLOROthiazide (HYDRODIURIL) 12.5 MG tablet Take 12.5 mg by mouth Daily.     • HYDROcodone-acetaminophen (NORCO) 7.5-325 MG per tablet Take 1 tablet by mouth Every 6 (Six) Hours As Needed for Moderate Pain . Dose decreased 11/12/20 120 tablet 0   • Insulin Glargine (BASAGLAR KWIKPEN) 100 UNIT/ML injection pen Inject 100 units under skin in the the appropriate area as directed every night.  (Patient taking differently: 60 Units Every Night.) 10 pen 2   • Insulin Pen Needle (B-D ULTRAFINE III SHORT PEN) 31G X 8 MM misc USE TO INJECT 4 TIMES A  each 11   • LORazepam (ATIVAN) 0.5 MG tablet Take 1 tablet by mouth Daily As Needed for Anxiety. 30 tablet 0   • meclizine (ANTIVERT) 25 MG tablet Take 1 tablet by mouth 3 (Three) Times a Day As Needed for dizziness. 90 tablet 6   • metoclopramide (REGLAN) 10 MG tablet TAKE 1 TABLET BY MOUTH FOUR TIMES A DAY AS NEEDED 120 tablet 1   • metoprolol succinate XL (TOPROL-XL) 25 MG 24 hr tablet TAKE 1 TABLET BY MOUTH EVERY DAY (Patient taking differently: Take 25 mg by mouth Daily.) 31 tablet 6   • mirtazapine (REMERON) 45 MG tablet TAKE 1 TABLET BY MOUTH EVERY NIGHT. 90 tablet 0   • NOVOLOG FLEXPEN 100 UNIT/ML solution pen-injector sc pen INJECT 60 UNITS BEFORE MEALS 3 TIMES A DAY (Patient taking differently: Inject 60 Units under the skin into the appropriate area as directed 3 (Three) Times a Day With Meals.) 162 mL 3   • ondansetron (ZOFRAN) 8 MG tablet TAKE 1 TABLET BY MOUTH EVERY 8 (EIGHT) HOURS AS NEEDED FOR NAUSEA OR VOMITING. 18 tablet 1   • pantoprazole (PROTONIX) 20 MG EC tablet TAKE 1 TABLET BY MOUTH EVERY DAY 90 tablet 3   • Syringe/Needle, Disp,  "(Luer Lock Safety Syringes) 25G X 5/8\" 3 ML misc 1 each Daily. 1 Q28 DAYS 1 each 2   • venlafaxine XR (EFFEXOR-XR) 150 MG 24 hr capsule Take 1 capsule by mouth 2 (Two) Times a Day. 90 capsule 1   • vitamin D (ERGOCALCIFEROL) 1.25 MG (01681 UT) capsule capsule TAKE ONE CAPSULE BY MOUTH EVERY SUNDAY. (Patient taking differently: Take 50,000 Units by mouth Every 7 (Seven) Days.) 12 capsule 2     No current facility-administered medications for this visit.      Review of Systems   Constitutional: Negative.    HENT: Negative.    Eyes: Negative.    Respiratory: Negative.    Cardiovascular: Negative.    Gastrointestinal: Negative.    Endocrine: Negative.    Genitourinary: Negative.    Musculoskeletal: Negative.    Skin: Negative.    Neurological: Positive for numbness.   Psychiatric/Behavioral: Negative.          OBJECTIVE    Pulse 94   Ht 172.7 cm (68\")   Wt 112 kg (247 lb)   SpO2 99%   BMI 37.56 kg/m²       Physical Exam   Constitutional: she appears well-developed and well-nourished.   HEENT: Normocephalic. Atraumatic.  CV: No CP. RRR  Resp: Non-labored respirations.  Psychiatric: she has a normal mood and affect. her behavior is normal.           Lower Extremity Exam:  Pulses palpable.  Capillary fill time within normal limits.  Mild  left foot and ankle edema.  No ecchymosis or erythema.  Left foot incisions well-healed, no signs of infection  Subtalar, talonavicular joint rectus, rigid. Moderate residual pes cavus  No TTP      Procedures         ASSESSMENT AND PLAN    Diagnoses and all orders for this visit:    1. Nonunion of subtalar arthrodesis (Primary)  -     XR Foot 3+ View Left    2. Pes cavus    3. Gait instability    4. Diabetic polyneuropathy associated with type 2 diabetes mellitus (CMS/HCC)        -Patient doing well postoperatively  -Radiographs ordered reviewed.  Alignment is maintained with no interval issues, continued integration of subtalar joint bone graft.   Questionable arthrodesis of " talonavicular joint, however no evidence of hardware loosening or failure.  -Continue cam boot, increase activity as tolerated  -Recheck 3 weeks, anticipate transition to regular shoes at that time            This document has been electronically signed by Ignacio Lord DPM on January 12, 2021 12:25 CST

## 2021-01-12 NOTE — TELEPHONE ENCOUNTER
Patient has insomnia requiring benzodiazepine use concurrently with chronic pain requiring opiate pain medication and/ or gabapentin. Patient has been education regarding potential dangers of oversedation and accidental overdose with use of both medications concurrently. Serial assessments of patient has yielded no sign of oversedation or adverse effects of patient, and he/she is advised and aware to notify my office immediately if symptoms do occur, as well as to discontinue use of benzodiazepine medication and opiate medication immediately if that occurs, pending medical evaluation and advice. Patient has been compliant with use of medications, UDS, visits with no adverse effects noted. Patient understands the risks associated with this controlled medication, including tolerance and addiction.  Patient also agrees to only obtain this medication from me, and not from a another provider, unless that provider is covering for me in my absence.  Patient also agrees to be compliant in dosing, and not self adjust the dose of medication.  A signed controlled substance agreement is on file, and the patient has received a controlled substance education sheet at this a previous visit.  The patient has also signed a consent for treatment with a controlled substance as per [facility] policy. LESTER was obtained. Refills were sent for: hydrocodone.     This document has been electronically signed by BEBE Palomino on January 12, 2021 13:37 CST

## 2021-01-13 RX ORDER — BLOOD SUGAR DIAGNOSTIC
STRIP MISCELLANEOUS
Qty: 100 EACH | Refills: 3 | Status: SHIPPED | OUTPATIENT
Start: 2021-01-13 | End: 2021-04-12

## 2021-01-19 ENCOUNTER — OFFICE VISIT (OUTPATIENT)
Dept: GASTROENTEROLOGY | Facility: CLINIC | Age: 61
End: 2021-01-19

## 2021-01-19 VITALS
HEIGHT: 68 IN | SYSTOLIC BLOOD PRESSURE: 139 MMHG | HEART RATE: 90 BPM | WEIGHT: 262.2 LBS | DIASTOLIC BLOOD PRESSURE: 77 MMHG | BODY MASS INDEX: 39.74 KG/M2

## 2021-01-19 DIAGNOSIS — K21.00 GASTROESOPHAGEAL REFLUX DISEASE WITH ESOPHAGITIS WITHOUT HEMORRHAGE: Primary | ICD-10-CM

## 2021-01-19 DIAGNOSIS — K31.84 GASTROPARESIS: ICD-10-CM

## 2021-01-19 PROCEDURE — 99213 OFFICE O/P EST LOW 20 MIN: CPT | Performed by: NURSE PRACTITIONER

## 2021-01-19 NOTE — PROGRESS NOTES
"Chief Complaint  gastroparesis    Subjective          Ariana Martinez presents to [facility] for follow-up regarding heartburn gastroparesis.  States he is about 1-2 episodes of severe nausea vomiting and had to start a Reglan but does not take it on a regular basis.  States Protonix generally controls heartburn very well she has some upper abdominal pain but relates that to surgical site where she had her lap band removed and the pain has been the same for her like a nagging soreness.  Heartburn  She complains of abdominal pain, heartburn and nausea. She reports no belching, no chest pain, no choking, no coughing, no early satiety, no globus sensation or no hoarse voice. This is a chronic problem. The problem has been waxing and waning. The heartburn duration is several minutes. The heartburn is of moderate intensity. The heartburn does not wake her from sleep. The heartburn does not limit her activity. The heartburn doesn't change with position. The symptoms are aggravated by certain foods. She has tried a PPI for the symptoms. Past procedures include an EGD.       Objective   Vital Signs:   /77 (BP Location: Left arm)   Pulse 90   Ht 172.7 cm (68\")   Wt 119 kg (262 lb 3.2 oz)   BMI 39.87 kg/m²     Physical Exam  Constitutional:       General: She is not in acute distress.     Appearance: Normal appearance. She is well-developed.   HENT:      Head: Normocephalic and atraumatic.   Neck:      Musculoskeletal: Normal range of motion and neck supple.      Thyroid: No thyromegaly.   Cardiovascular:      Rate and Rhythm: Normal rate and regular rhythm.      Heart sounds: Normal heart sounds.   Pulmonary:      Effort: Pulmonary effort is normal.      Breath sounds: Normal breath sounds. No wheezing, rhonchi or rales.   Abdominal:      General: Bowel sounds are normal. There is no distension.      Palpations: Abdomen is soft. Abdomen is not rigid.      Tenderness: There is " abdominal tenderness in the left upper quadrant. There is no guarding.      Hernia: No hernia is present.   Lymphadenopathy:      Cervical: No cervical adenopathy.   Skin:     General: Skin is warm and dry.      Coloration: Skin is not pale.      Findings: No rash.   Neurological:      Mental Status: She is alert and oriented to person, place, and time.   Psychiatric:         Speech: Speech normal.         Behavior: Behavior is cooperative.        Result Review :     CMP    CMP 6/10/20 9/23/20 11/12/20   BUN 10 8 10   Creatinine 0.78 0.75 0.79   eGFR Non African Am 76 79 75   Sodium 136 142 140   Potassium 4.2 3.6 3.3 (A)   Chloride 97 (A) 102 101   Calcium 9.8 9.3 9.6   Albumin 4.10  4.20   Total Bilirubin 0.6  0.3   Alkaline Phosphatase 144 (A)  141 (A)   AST (SGOT) 14  24   ALT (SGPT) 15  23   (A) Abnormal value       Comments are available for some flowsheets but are not being displayed.           CBC    CBC 11/12/20 12/14/20 12/15/20   WBC 9.22 10.64 10.37   RBC 4.93 4.80 4.64   Hemoglobin 14.4 14.0 13.8   Hematocrit 44.2 41.8 40.2   MCV 89.7 87.1 86.6   MCH 29.2 29.2 29.7   MCHC 32.6 33.5 34.3   RDW 13.3 13.4 13.4   Platelets [phone]                     Assessment and Plan    Problem List Items Addressed This Visit        Other    Gastroparesis    Overview     Added automatically from request for surgery [id number]           Other Visit Diagnoses     Gastroesophageal reflux disease with esophagitis without hemorrhage    -  Primary        Continue current regimen Reglan for flares of gastroparesis instructed not to take on a long-term basis.  Continue PPI daily for reflux avoid gastric irritants follow standard antireflux measures.  Refills previously sent via computer.    I spent 18 minutes caring for Ariana on this date of service. This time includes time spent by me in the following activities:preparing for the visit, reviewing tests, obtaining and/or reviewing a separately obtained history, performing a  medically appropriate examination and/or evaluation , counseling and educating the patient/family/caregiver, ordering medications, tests, or procedures and documenting information in the medical record  Follow Up   Return in about 6 months (around 7/19/2021) for Recheck.  Patient was given instructions and counseling regarding her condition or for health maintenance advice. Please see specific information pulled into the AVS if appropriate.

## 2021-01-25 ENCOUNTER — TELEPHONE (OUTPATIENT)
Dept: FAMILY MEDICINE CLINIC | Facility: CLINIC | Age: 61
End: 2021-01-25

## 2021-01-27 RX ORDER — METOCLOPRAMIDE 10 MG/1
TABLET ORAL
Qty: 120 TABLET | Refills: 1 | Status: SHIPPED | OUTPATIENT
Start: 2021-01-27 | End: 2021-03-22

## 2021-01-29 DIAGNOSIS — G89.29 CHRONIC RIGHT-SIDED LOW BACK PAIN WITH RIGHT-SIDED SCIATICA: Chronic | ICD-10-CM

## 2021-01-29 DIAGNOSIS — M54.41 CHRONIC RIGHT-SIDED LOW BACK PAIN WITH RIGHT-SIDED SCIATICA: Chronic | ICD-10-CM

## 2021-01-29 DIAGNOSIS — F41.1 GENERALIZED ANXIETY DISORDER: ICD-10-CM

## 2021-01-31 ENCOUNTER — TELEPHONE (OUTPATIENT)
Dept: FAMILY MEDICINE CLINIC | Facility: CLINIC | Age: 61
End: 2021-01-31

## 2021-01-31 RX ORDER — LORAZEPAM 0.5 MG/1
0.5 TABLET ORAL DAILY PRN
Qty: 30 TABLET | Refills: 0 | Status: SHIPPED | OUTPATIENT
Start: 2021-01-31 | End: 2021-03-08

## 2021-01-31 RX ORDER — GABAPENTIN 400 MG/1
400 CAPSULE ORAL 3 TIMES DAILY
Qty: 90 CAPSULE | Refills: 2 | Status: SHIPPED | OUTPATIENT
Start: 2021-01-31 | End: 2021-04-19 | Stop reason: SDUPTHER

## 2021-01-31 NOTE — TELEPHONE ENCOUNTER
Patient has chronic anxiety requiring benzodiazepine use concurrently with chronic pain requiring opiate pain medication and/ or gabapentin. Patient has been educated regarding potential dangers of oversedation and accidental overdose with use of both medications concurrently. Serial assessments of patient has yielded no sign of oversedation or adverse effects of patient, and he/she is advised and aware to notify my office immediately if symptoms do occur, as well as to discontinue use of benzodiazepine medication and opiate medication immediately if that occurs, pending medical evaluation and advice. Patient has been compliant with use of medications, UDS, visits with no adverse effects noted. Patient understands the risks associated with this controlled medication, including tolerance and addiction.  Patient also agrees to only obtain this medication from me, and not from a another provider, unless that provider is covering for me in my absence.  Patient also agrees to be compliant in dosing, and not self adjust the dose of medication.  A signed controlled substance agreement is on file, and the patient has received a controlled substance education sheet at this a previous visit.  The patient has also signed a consent for treatment with a controlled substance as per [facility] policy. LESTER was obtained. Refills were sent for lorazepam and gabapentin.     This document has been electronically signed by BEBE Palomino on January 31, 2021 16:42 CST

## 2021-02-02 ENCOUNTER — OFFICE VISIT (OUTPATIENT)
Dept: PODIATRY | Facility: CLINIC | Age: 61
End: 2021-02-02

## 2021-02-02 VITALS — BODY MASS INDEX: 39.71 KG/M2 | HEART RATE: 71 BPM | HEIGHT: 68 IN | WEIGHT: 262 LBS | OXYGEN SATURATION: 98 %

## 2021-02-02 DIAGNOSIS — M96.0 NONUNION OF SUBTALAR ARTHRODESIS: Primary | ICD-10-CM

## 2021-02-02 DIAGNOSIS — E11.42 DIABETIC POLYNEUROPATHY ASSOCIATED WITH TYPE 2 DIABETES MELLITUS (HCC): ICD-10-CM

## 2021-02-02 DIAGNOSIS — R26.81 GAIT INSTABILITY: ICD-10-CM

## 2021-02-02 DIAGNOSIS — M79.662 PAIN OF LEFT CALF: ICD-10-CM

## 2021-02-02 DIAGNOSIS — M79.672 LEFT FOOT PAIN: ICD-10-CM

## 2021-02-02 DIAGNOSIS — Q66.70 PES CAVUS: ICD-10-CM

## 2021-02-02 PROCEDURE — 99213 OFFICE O/P EST LOW 20 MIN: CPT | Performed by: PODIATRIST

## 2021-02-03 ENCOUNTER — TELEPHONE (OUTPATIENT)
Dept: PODIATRY | Facility: CLINIC | Age: 61
End: 2021-02-03

## 2021-02-03 NOTE — TELEPHONE ENCOUNTER
PT REQUESTS A CALL BACK RE WANTS TO KNOW IF A C SCAN ORDER HAS BEEN SENT IN FOR HER YET.  CALL BACK # [phone].  THANK YOU.

## 2021-02-09 ENCOUNTER — DOCUMENTATION (OUTPATIENT)
Dept: ENDOCRINOLOGY | Facility: CLINIC | Age: 61
End: 2021-02-09

## 2021-02-10 ENCOUNTER — HOSPITAL ENCOUNTER (OUTPATIENT)
Dept: CT IMAGING | Facility: HOSPITAL | Age: 61
Discharge: HOME | End: 2021-02-10
Admitting: PODIATRIST

## 2021-02-10 DIAGNOSIS — M96.0 NONUNION OF SUBTALAR ARTHRODESIS: ICD-10-CM

## 2021-02-10 PROCEDURE — 73700 CT LOWER EXTREMITY W/O DYE: CPT

## 2021-02-12 ENCOUNTER — TELEPHONE (OUTPATIENT)
Dept: FAMILY MEDICINE CLINIC | Facility: CLINIC | Age: 61
End: 2021-02-12

## 2021-02-12 DIAGNOSIS — G89.29 CHRONIC FOOT PAIN, LEFT: ICD-10-CM

## 2021-02-12 DIAGNOSIS — M54.41 CHRONIC RIGHT-SIDED LOW BACK PAIN WITH RIGHT-SIDED SCIATICA: Chronic | ICD-10-CM

## 2021-02-12 DIAGNOSIS — M79.672 CHRONIC FOOT PAIN, LEFT: ICD-10-CM

## 2021-02-12 DIAGNOSIS — G89.29 CHRONIC RIGHT-SIDED LOW BACK PAIN WITH RIGHT-SIDED SCIATICA: Chronic | ICD-10-CM

## 2021-02-12 RX ORDER — HYDROCODONE BITARTRATE AND ACETAMINOPHEN 7.5; 325 MG/1; MG/1
1 TABLET ORAL EVERY 6 HOURS PRN
Qty: 120 TABLET | Refills: 0 | OUTPATIENT
Start: 2021-02-12 | End: 2021-02-20

## 2021-02-12 NOTE — TELEPHONE ENCOUNTER
Patient seen in office every 3 months for chronic pain requiring opiate pain medication. Compliant with medication, visits with no adverse effects noted. LESTER and UDS current and appropriate. Patient called requesting scheduled refill at appropriate interval. Patient understands the risks associated with this controlled medication, including tolerance and addiction.  Patient also agrees to only obtain this medication from me, and not from a another provider, unless that provider is covering for me in my absence.  Patient also agrees to be compliant in dosing, and not self adjust the dose of medication.  A signed controlled substance agreement is on file, and the patient has received a controlled substance education sheet at this a previous visit.  The patient has also signed a consent for treatment with a controlled substance as per [facility] policy. LESTER was obtained.   Refill sent for hydrocodone.     This document has been electronically signed by BEBE Palomino on February 12, 2021 17:48 CST

## 2021-02-15 ENCOUNTER — OFFICE VISIT (OUTPATIENT)
Dept: PODIATRY | Facility: CLINIC | Age: 61
End: 2021-02-15

## 2021-02-15 VITALS — WEIGHT: 262 LBS | HEART RATE: 74 BPM | BODY MASS INDEX: 39.71 KG/M2 | OXYGEN SATURATION: 98 % | HEIGHT: 68 IN

## 2021-02-15 DIAGNOSIS — Q66.70 PES CAVUS: ICD-10-CM

## 2021-02-15 DIAGNOSIS — M19.079 ANKLE ARTHRITIS: ICD-10-CM

## 2021-02-15 DIAGNOSIS — E11.42 DIABETIC POLYNEUROPATHY ASSOCIATED WITH TYPE 2 DIABETES MELLITUS (HCC): ICD-10-CM

## 2021-02-15 DIAGNOSIS — T85.848A PAIN FROM IMPLANTED HARDWARE, INITIAL ENCOUNTER: Primary | ICD-10-CM

## 2021-02-15 PROCEDURE — 99214 OFFICE O/P EST MOD 30 MIN: CPT | Performed by: PODIATRIST

## 2021-02-15 RX ORDER — BUPIVACAINE HCL/0.9 % NACL/PF 0.1 %
2 PLASTIC BAG, INJECTION (ML) EPIDURAL ONCE
Status: CANCELLED | OUTPATIENT
Start: 2021-02-26 | End: 2021-02-15

## 2021-02-15 NOTE — PROGRESS NOTES
Arianatorsten Martinez  1960  61 y.o. female   DANIELA Gamboa - 12/09/2020  BS - 115 per pt.    Patient presents today for post op appointment of her left foot.     02/02/2021        Chief Complaint   Patient presents with   • Left Foot - Follow-up       History of Present Illness    Ms Martinez is a 60 y/o diabetic female who presents for follow-up of left subtalar joint and talonavicular joint arthrodesis.  Date of surgery 9/30/2020.  She is doing well overall with no postoperative pain.  She has been ambulating as tolerated in her cam boot.  Does note a mild increase in left calf cramping.  She does also have concern of a small black spot on the bottom of her right foot she is concerned she may have a splinter.    Past Medical History:   Diagnosis Date   • Angina, class IV (CMS/HCC)    • Anxiety    • Anxiety and depression    • Benign paroxysmal positional vertigo    • Bladder disorder     has bladder stimulator   • Carpal tunnel syndrome    • Chronic pain    • Coronary atherosclerosis     hx CABG 2005.  is followed by Dr Kwon   • Depression    • Diabetes mellitus (CMS/HCC)     Type 2, controlled   • Diabetic polyneuropathy (CMS/HCC)    • Disease of thyroid gland    • Elevated cholesterol    • Female stress incontinence    • Foot pain, left    • Full dentures    • Gastroparesis    • GERD (gastroesophageal reflux disease)    • Hyperlipidemia    • Hypertension    • Low back pain    • Malaise and fatigue    • Multiple joint pain    • Obesity     Refuses to be weighed   • Otalgia     Both   • Perforation of tympanic membrane     Left   • Postoperative wound infection    • Vitamin D deficiency    • Wears glasses     reading         Past Surgical History:   Procedure Laterality Date   • ABDOMINAL SURGERY     • ANGIOPLASTY      coronary   • BREAST BIOPSY Right    • CARDIAC CATHETERIZATION     • CARDIAC CATHETERIZATION N/A 6/20/2017    Procedure: Right Heart Cath;  Surgeon: Can Kwon MD PhD;  Location: [facility]  CATH INVASIVE LOCATION;  Service:    • CARDIAC CATHETERIZATION N/A 2/18/2020    Procedure: Left Heart Cath;  Surgeon: Catalina Cooper MD;  Location: [facility] CATH INVASIVE LOCATION;  Service: Cardiology;  Laterality: N/A;   • CARPAL TUNNEL RELEASE     • CHOLECYSTECTOMY     • COLONOSCOPY N/A 6/24/2020    Procedure: COLONOSCOPY;  Surgeon: Julián Maldonado MD;  Location: [facility] ENDOSCOPY;  Service: Gastroenterology;  Laterality: N/A;   • CORONARY ARTERY BYPASS GRAFT  2005   • ENDOSCOPY N/A 10/19/2018    Procedure: ESOPHAGOGASTRODUODENOSCOPY possible dilation;  Surgeon: Julián Maldonado MD;  Location: [facility] ENDOSCOPY;  Service: Gastroenterology   • ENDOSCOPY N/A 6/24/2020    Procedure: ESOPHAGOGASTRODUODENOSCOPY WED appt please;  Surgeon: Julián Maldonado MD;  Location: [facility] ENDOSCOPY;  Service: Gastroenterology;  Laterality: N/A;   • ENDOSCOPY AND COLONOSCOPY     • FOOT SURGERY      Toes   • FOOT SURGERY     • GASTRIC BANDING      Revision, laparoscopic   • HYSTERECTOMY     • MOUTH SURGERY     • SALPINGO OOPHORECTOMY     • SHOULDER SURGERY     • SUBTALAR ARTHRODESIS Left 1/16/2019    Procedure: LEFT FOOT HARDWARE REMOVAL, FIFTH METATARSAL , OPEN REDUCTION INTERNAL FIXATION, CALCANEAL OSTEOTOMY;  Surgeon: Ignacio Lord DPM;  Location: [facility] OR;  Service: Podiatry   • SUBTALAR ARTHRODESIS Left 10/16/2019    Procedure: foot hardware removal, subtalar joint fusion  possible de/reattachment of achilles tendon        (c-arm);  Surgeon: Ignacio Lord DPM;  Location: [facility] OR;  Service: Podiatry   • SUBTALAR ARTHRODESIS Left 9/30/2020    Procedure: subtalar, talonavicular joint arthrodesis.  Removal hardware.          (c-arm);  Surgeon: Ignacio Lord DPM;  Location: [facility] OR;  Service: Podiatry;  Laterality: Left;   • TRANSESOPHAGEAL ECHOCARDIOGRAM (LAMONTE)      With color flow         Family History   Problem Relation Age of Onset   • Diabetes Other    • Heart disease Other    • Hypertension Other    •  Heart disease Mother    • Stroke Mother    • Hypertension Mother    • Diabetes Sister    • Heart disease Sister    • Hypertension Sister    • Heart disease Sister    • Diabetes Sister    • Hypertension Sister    • Diabetes Sister    • Diabetes Sister    • Diabetes Sister    • Diabetes Sister          Social History     Socioeconomic History   • Marital status:      Spouse name: Not on file   • Number of children: Not on file   • Years of education: Not on file   • Highest education level: Not on file   Tobacco Use   • Smoking status: Never Smoker   • Smokeless tobacco: Never Used   Substance and Sexual Activity   • Alcohol use: No   • Drug use: No   • Sexual activity: Defer         Current Outpatient Medications   Medication Sig Dispense Refill   • Accu-Chek Iris Plus test strip USE TO CHECK BLOOD SUGAR 4 TIMES DAILY. ACCUCHECK, E11.9 100 each 3   • ARIPiprazole (ABILIFY) 15 MG tablet Take 1 tablet by mouth Daily. 90 tablet 1   • aspirin 81 MG chewable tablet Chew 81 mg daily.     • atorvastatin (LIPITOR) 80 MG tablet TAKE 1 TABLET BY MOUTH EVERY DAY 90 tablet 1   • BD SHARPS CONTAINER HOME misc 1 each Take As Directed. 1 each 0   • Blood Glucose Monitoring Suppl (ONE TOUCH ULTRA MINI) w/Device kit USE AS DIRECTED TO CHECK BLOOD SUGAR 1 each 0   • Calcium Citrate-Vitamin D (CITRACAL/VITAMIN D) 250-200 MG-UNIT tablet Take 2 tablets by mouth 2 (two) times a day.     • cloNIDine (CATAPRES) 0.1 MG tablet Take 1 tablet by mouth 2 (Two) Times a Day. PRN systolic BP >160. 20 tablet 0   • clopidogrel (PLAVIX) 75 MG tablet Take 1 tablet by mouth Daily. 30 tablet 5   • cyanocobalamin 1000 MCG/ML injection INJECT 1 ML INTO THE APPROPRIATE MUSCLE AS DIRECTED BY PRESCRIBER EVERY 28 (TWENTY-EIGHT) DAYS. 3 mL 0   • folic acid (FOLVITE) 1 MG tablet Take 1 tablet by mouth Daily. 90 tablet 1   • furosemide (LASIX) 40 MG tablet TAKE 1 TABLET BY MOUTH EVERY DAY (Patient taking differently: Take 40 mg by mouth Daily.) 90 tablet  "1   • gabapentin (NEURONTIN) 400 MG capsule Take 1 capsule by mouth 3 (Three) Times a Day. 90 capsule 2   • GLUCAGON EMERGENCY 1 MG injection USE AS DIRECTED AS NEEDED 1 kit 0   • glucose monitor monitoring kit 1 each Daily. accucheck eve meter, E11.9 1 each 0   • hydroCHLOROthiazide (HYDRODIURIL) 12.5 MG tablet Take 12.5 mg by mouth Daily.     • HYDROcodone-acetaminophen (NORCO) 7.5-325 MG per tablet Take 1 tablet by mouth Every 6 (Six) Hours As Needed for Moderate Pain . Dose decreased 11/12/20 120 tablet 0   • Insulin Glargine (BASAGLAR KWIKPEN) 100 UNIT/ML injection pen Inject 100 units under skin in the the appropriate area as directed every night.  (Patient taking differently: 60 Units Every Night.) 10 pen 2   • Insulin Pen Needle (B-D ULTRAFINE III SHORT PEN) 31G X 8 MM misc USE TO INJECT 4 TIMES A  each 11   • LORazepam (ATIVAN) 0.5 MG tablet Take 1 tablet by mouth Daily As Needed for Anxiety. 30 tablet 0   • meclizine (ANTIVERT) 25 MG tablet Take 1 tablet by mouth 3 (Three) Times a Day As Needed for dizziness. 90 tablet 6   • metoclopramide (REGLAN) 10 MG tablet TAKE 1 TABLET BY MOUTH FOUR TIMES A DAY AS NEEDED 120 tablet 1   • metoprolol succinate XL (TOPROL-XL) 25 MG 24 hr tablet TAKE 1 TABLET BY MOUTH EVERY DAY (Patient taking differently: Take 25 mg by mouth Daily.) 31 tablet 6   • mirtazapine (REMERON) 45 MG tablet TAKE 1 TABLET BY MOUTH EVERY NIGHT. 90 tablet 0   • NOVOLOG FLEXPEN 100 UNIT/ML solution pen-injector sc pen INJECT 60 UNITS BEFORE MEALS 3 TIMES A DAY (Patient taking differently: Inject 60 Units under the skin into the appropriate area as directed 3 (Three) Times a Day With Meals.) 162 mL 3   • ondansetron (ZOFRAN) 8 MG tablet TAKE 1 TABLET BY MOUTH EVERY 8 (EIGHT) HOURS AS NEEDED FOR NAUSEA OR VOMITING. 18 tablet 1   • pantoprazole (PROTONIX) 20 MG EC tablet TAKE 1 TABLET BY MOUTH EVERY DAY 90 tablet 3   • Syringe/Needle, Disp, (Luer Lock Safety Syringes) 25G X 5/8\" 3 ML misc 1 " "each Daily. 1 Q28 DAYS 1 each 2   • venlafaxine XR (EFFEXOR-XR) 150 MG 24 hr capsule Take 1 capsule by mouth 2 (Two) Times a Day. 90 capsule 1   • vitamin D (ERGOCALCIFEROL) 1.25 MG (13612 UT) capsule capsule TAKE ONE CAPSULE BY MOUTH EVERY SUNDAY. (Patient taking differently: Take 50,000 Units by mouth Every 7 (Seven) Days.) 12 capsule 2     No current facility-administered medications for this visit.      Review of Systems   Constitutional: Negative.    HENT: Negative.    Eyes: Negative.    Respiratory: Negative.    Cardiovascular: Negative.    Gastrointestinal: Negative.    Endocrine: Negative.    Genitourinary: Negative.    Musculoskeletal: Negative.    Skin: Negative.    Neurological: Positive for numbness.   Psychiatric/Behavioral: Negative.          OBJECTIVE    Pulse 74   Ht 172.7 cm (68\")   Wt 119 kg (262 lb)   SpO2 98%   BMI 39.84 kg/m²       Physical Exam   Constitutional: she appears well-developed and well-nourished.   HEENT: Normocephalic. Atraumatic.  CV: No CP. RRR  Resp: Non-labored respirations.  Psychiatric: she has a normal mood and affect. her behavior is normal.           Lower Extremity Exam:  Pulses palpable.  Capillary fill time within normal limits.  Mild  left foot and ankle edema.  No ecchymosis or erythema.  Left foot incisions well-healed, no signs of infection  Subtalar, talonavicular joint rectus, rigid. Moderate residual pes cavus  No TTP  Ankle range of motion full without pain or crepitus  Mild tenderness with compression of the left calf          Exam: CT left foot without contrast     INDICATION: Orthopedic implant, pseudoarthrosis     TECHNIQUE: Routine CT left foot without contrast. Sagittal  coronal reconstructions were obtained. This exam was performed  according to our departmental dose-optimization program, which  includes automated exposure control, adjustment of the mA and/or  kV according to patient size and/or use of iterative  reconstruction " technique.     COMPARISON: 2/2/2021, CT 6/5/2020     FINDINGS: The previously screws from the prior CT have been  removed and four screws have been placed along across the  talocalcaneal and talonavicular joints. The medial anterior screw  extends from the medial navicular bone to the posterior lateral  talus. The lateral anterior screw extends from the lateral  navicular bone to the posterior lateral talus. The anterior  ventral loss screw enters the mid calcaneus extends to the talar  dome. The posterior ventral screw enters the posterior inferior  margin of the calcaneus extends obliquely across the  talocalcaneal joint with the tip residing in the anterior aspect  of the talar dome. There is at least two bony struts seen medial  and lateral to the ventral screws at the talocalcaneal joint.  There is less lucency seen at the pseudarthrosis compared to  previous CT study. There are progressive arthritic changes at the  talonavicular tibial joint. There is a fracture/mild  fragmentation at the talar dome. There is no significant lucency  seen along the margins of the four screws. There is also of  slightly more fragmentation seen within the posterior calcaneus  compared to the previous study. No obvious acute fracture or  dislocation.     IMPRESSION:  1. Postsurgical changes compatible with surgical arthrodesis at  the talocalcaneal joint and talonavicular joint.  2. Decreased lucency at the talocalcaneal arthrodesis site  compared to previous CT exam.  2. There is fracture/fragmentation noted along the talar dome  with a progressive arthritic change at the talotibial joint  compared to the previous exam. Recommendation clinical findings  and follow-up as clinically warranted.     Electronically signed by:  Jesus Lord MD  2/11/2021 10:18 AM  [facility] Workstation: [id number]      Ariana Martinez  Duplex scan of extremity veins including responses to compression and other maneuvers; unilateral  Order#  [id number]  Ordering physician: Ignacio Lord DPM Study date: 21   Patient Information    Patient Name   Ariana Martinez MRN   [id number] Sex   Female  (Age)   1960 (61 y.o.)   Clinical Indication    Left calf pain, prolonged immobilization   Dx: Pain of left calf [M79.662 (ICD-10-CM)]   Comments:       Interpretation Summary    · Negative left lower extremity venous duplex scan for DVT.  · A 3.65cm hyperemic lymph node is noted in the left groin.  · Only one posterior tibial and one peroneal vein is identified.       Procedures         ASSESSMENT AND PLAN    Diagnoses and all orders for this visit:    1. Pain from implanted hardware, initial encounter (Primary)  -     Case Request  -     ceFAZolin (ANCEF) 2 g in sodium chloride 0.9 % 100 mL IVPB    2. Pes cavus    3. Diabetic polyneuropathy associated with type 2 diabetes mellitus (CMS/[facility])    4. Ankle arthritis  -     Case Request  -     ceFAZolin (ANCEF) 2 g in sodium chloride 0.9 % 100 mL IVPB    Other orders  -     Follow Anesthesia Guidelines / Protocol; Future  -     Obtain Informed Consent; Future  -     Follow Anesthesia Guidelines / Protocol; Standing  -     Provide Instructions to Patient Regarding NPO Status; Future  -     Verify NPO Status; Standing  -     Obtain Informed Consent (If Not Done Inpatient or PAT); Standing        -Patient doing fairly well postoperatively  -Venous duplex reviewed with patient.  Negative for DVT.  -CT scan also reviewed with patient.  Explained mixed results of CAT scan as she does seem to have increased union and integration of bone graft within the subtalar joint.  However due to some subsidence of the bone graft and loosening of the posterior talocalcaneal cannulated screw has been some encroachment to the medial talar dome and worsening arthritic change at this level.  This finding does not seem to be causing any crepitus or pain at this time.  The talonavicular joint does show partial union most  notably plantarly, medially and laterally.  There is persistent joint space noted at the central joint.  -Given these findings and overall clinical improvement I did recommend semiurgent removal of loosened calcaneal screw of the left foot with possible backfilling of talar dome subchondral defect with calcium phosphate to improve subchondral stability, possible arthroscopic ankle evaluation.  All risk, benefits and potential complication as well as expected postoperative course discussed.  Patient is in agreement and wishes to proceed.  -We will plan for 2/26/2021  -Recheck 2 weeks, continue cam boot for ambulation in the interim.            This document has been electronically signed by Ignacio Lord DPM on February 15, 2021 16:27 CST

## 2021-02-16 PROBLEM — T85.848A PAIN FROM IMPLANTED HARDWARE: Status: ACTIVE | Noted: 2021-02-16

## 2021-02-16 PROBLEM — M19.079 ANKLE ARTHRITIS: Status: ACTIVE | Noted: 2021-02-16

## 2021-02-20 ENCOUNTER — APPOINTMENT (OUTPATIENT)
Dept: CT IMAGING | Facility: HOSPITAL | Age: 61
End: 2021-02-20

## 2021-02-20 ENCOUNTER — HOSPITAL ENCOUNTER (EMERGENCY)
Facility: HOSPITAL | Age: 61
Discharge: HOME | End: 2021-02-20
Attending: EMERGENCY MEDICINE | Admitting: EMERGENCY MEDICINE

## 2021-02-20 ENCOUNTER — APPOINTMENT (OUTPATIENT)
Dept: ULTRASOUND IMAGING | Facility: HOSPITAL | Age: 61
End: 2021-02-20

## 2021-02-20 VITALS
RESPIRATION RATE: 18 BRPM | SYSTOLIC BLOOD PRESSURE: 140 MMHG | OXYGEN SATURATION: 98 % | BODY MASS INDEX: 39.4 KG/M2 | WEIGHT: 260 LBS | DIASTOLIC BLOOD PRESSURE: 74 MMHG | HEART RATE: 74 BPM | HEIGHT: 68 IN | TEMPERATURE: 97.5 F

## 2021-02-20 DIAGNOSIS — M79.672 CHRONIC FOOT PAIN, LEFT: ICD-10-CM

## 2021-02-20 DIAGNOSIS — M54.41 CHRONIC RIGHT-SIDED LOW BACK PAIN WITH RIGHT-SIDED SCIATICA: Chronic | ICD-10-CM

## 2021-02-20 DIAGNOSIS — G89.29 CHRONIC RIGHT-SIDED LOW BACK PAIN WITH RIGHT-SIDED SCIATICA: Chronic | ICD-10-CM

## 2021-02-20 DIAGNOSIS — M79.672 FOOT PAIN, LEFT: Primary | ICD-10-CM

## 2021-02-20 DIAGNOSIS — G89.29 CHRONIC FOOT PAIN, LEFT: ICD-10-CM

## 2021-02-20 PROCEDURE — 73700 CT LOWER EXTREMITY W/O DYE: CPT

## 2021-02-20 PROCEDURE — 99283 EMERGENCY DEPT VISIT LOW MDM: CPT

## 2021-02-20 PROCEDURE — 93971 EXTREMITY STUDY: CPT

## 2021-02-20 RX ORDER — HYDROCODONE BITARTRATE AND ACETAMINOPHEN 5; 325 MG/1; MG/1
1 TABLET ORAL ONCE
Status: COMPLETED | OUTPATIENT
Start: 2021-02-20 | End: 2021-02-20

## 2021-02-20 RX ORDER — HYDROCODONE BITARTRATE AND ACETAMINOPHEN 5; 325 MG/1; MG/1
1 TABLET ORAL EVERY 6 HOURS PRN
Qty: 15 TABLET | Refills: 0 | Status: SHIPPED | OUTPATIENT
Start: 2021-02-20 | End: 2021-03-02

## 2021-02-20 RX ADMIN — HYDROCODONE BITARTRATE AND ACETAMINOPHEN 1 TABLET: 5; 325 TABLET ORAL at 13:40

## 2021-02-20 NOTE — ED PROVIDER NOTES
Subjective   Patient presents emergency department complaint of foot pain.  Patient is under the care of Dr. Lord in podiatry after postoperative subtalar arthrodesis in September.  Patient has had regular follow-ups with healing.  Patient notes pain in the midfoot to talar region today she woke up with the symptoms.  Patient has previously been followed by Dr. Lord without difficulties with her healing is somewhat concerned this pain is been rising up the back of her leg.  Denies any fevers or shortness of breath.  No nausea vomiting.  There is been no significant skin changes.  Possibly some edema the patient notes in the region, she has been wearing a boot and has had the area immobilized for some time.          Review of Systems   Constitutional: Negative.  Negative for appetite change, chills and fever.   HENT: Negative.  Negative for congestion.    Eyes: Negative.  Negative for photophobia and visual disturbance.   Respiratory: Negative.  Negative for cough, chest tightness and shortness of breath.    Cardiovascular: Negative.  Negative for chest pain and palpitations.   Gastrointestinal: Negative.  Negative for abdominal pain, constipation, diarrhea, nausea and vomiting.   Endocrine: Negative.    Genitourinary: Negative.  Negative for decreased urine volume, dysuria, flank pain and hematuria.   Musculoskeletal: Positive for arthralgias and gait problem. Negative for back pain, myalgias, neck pain and neck stiffness.   Skin: Negative.  Negative for pallor.   Neurological: Negative for dizziness, syncope, weakness, light-headedness, numbness and headaches.   Psychiatric/Behavioral: Negative.  Negative for confusion and suicidal ideas. The patient is not nervous/anxious.    All other systems reviewed and are negative.      Past Medical History:   Diagnosis Date   • Angina, class IV (CMS/HCC)    • Anxiety    • Anxiety and depression    • Benign paroxysmal positional vertigo    • Bladder disorder     has  bladder stimulator   • Carpal tunnel syndrome    • Chronic pain    • Coronary atherosclerosis     hx CABG 2005.  is followed by Dr Kwon   • Depression    • Diabetes mellitus (CMS/HCC)     Type 2, controlled   • Diabetic polyneuropathy (CMS/HCC)    • Disease of thyroid gland    • Elevated cholesterol    • Female stress incontinence    • Foot pain, left    • Full dentures    • Gastroparesis    • GERD (gastroesophageal reflux disease)    • Hyperlipidemia    • Hypertension    • Low back pain    • Malaise and fatigue    • Multiple joint pain    • Obesity     Refuses to be weighed   • Otalgia     Both   • Perforation of tympanic membrane     Left   • Postoperative wound infection    • Vitamin D deficiency    • Wears glasses     reading       Allergies   Allergen Reactions   • Seroquel [Quetiapine] Anaphylaxis   • Avandia [Rosiglitazone] Swelling   • Morphine And Related Hallucinations   • Oxycodone Hallucinations       Past Surgical History:   Procedure Laterality Date   • ABDOMINAL SURGERY     • ANGIOPLASTY      coronary   • BREAST BIOPSY Right    • CARDIAC CATHETERIZATION     • CARDIAC CATHETERIZATION N/A 6/20/2017    Procedure: Right Heart Cath;  Surgeon: Can Kwon MD PhD;  Location: [facility] CATH INVASIVE LOCATION;  Service:    • CARDIAC CATHETERIZATION N/A 2/18/2020    Procedure: Left Heart Cath;  Surgeon: Catalina Cooper MD;  Location: [facility] CATH INVASIVE LOCATION;  Service: Cardiology;  Laterality: N/A;   • CARPAL TUNNEL RELEASE     • CHOLECYSTECTOMY     • COLONOSCOPY N/A 6/24/2020    Procedure: COLONOSCOPY;  Surgeon: Julián Maldonado MD;  Location: [facility] ENDOSCOPY;  Service: Gastroenterology;  Laterality: N/A;   • CORONARY ARTERY BYPASS GRAFT  2005   • ENDOSCOPY N/A 10/19/2018    Procedure: ESOPHAGOGASTRODUODENOSCOPY possible dilation;  Surgeon: Julián Maldonado MD;  Location: [facility] ENDOSCOPY;  Service: Gastroenterology   • ENDOSCOPY N/A 6/24/2020    Procedure: ESOPHAGOGASTRODUODENOSCOPY  WED appt please;  Surgeon: Julián Maldonado MD;  Location: [facility] ENDOSCOPY;  Service: Gastroenterology;  Laterality: N/A;   • ENDOSCOPY AND COLONOSCOPY     • FOOT SURGERY      Toes   • FOOT SURGERY     • GASTRIC BANDING      Revision, laparoscopic   • HYSTERECTOMY     • MOUTH SURGERY     • SALPINGO OOPHORECTOMY     • SHOULDER SURGERY     • SUBTALAR ARTHRODESIS Left 1/16/2019    Procedure: LEFT FOOT HARDWARE REMOVAL, FIFTH METATARSAL , OPEN REDUCTION INTERNAL FIXATION, CALCANEAL OSTEOTOMY;  Surgeon: Ignacio Lord DPM;  Location: [facility] OR;  Service: Podiatry   • SUBTALAR ARTHRODESIS Left 10/16/2019    Procedure: foot hardware removal, subtalar joint fusion  possible de/reattachment of achilles tendon        (c-arm);  Surgeon: Ignacio Lord DPM;  Location: [facility] OR;  Service: Podiatry   • SUBTALAR ARTHRODESIS Left 9/30/2020    Procedure: subtalar, talonavicular joint arthrodesis.  Removal hardware.          (c-arm);  Surgeon: Ignacio Lord DPM;  Location: [facility] OR;  Service: Podiatry;  Laterality: Left;   • TRANSESOPHAGEAL ECHOCARDIOGRAM (LAMONTE)      With color flow       Family History   Problem Relation Age of Onset   • Diabetes Other    • Heart disease Other    • Hypertension Other    • Heart disease Mother    • Stroke Mother    • Hypertension Mother    • Diabetes Sister    • Heart disease Sister    • Hypertension Sister    • Heart disease Sister    • Diabetes Sister    • Hypertension Sister    • Diabetes Sister    • Diabetes Sister    • Diabetes Sister    • Diabetes Sister        Social History     Socioeconomic History   • Marital status:      Spouse name: Not on file   • Number of children: Not on file   • Years of education: Not on file   • Highest education level: Not on file   Tobacco Use   • Smoking status: Never Smoker   • Smokeless tobacco: Never Used   Substance and Sexual Activity   • Alcohol use: No   • Drug use: No   • Sexual activity: Defer           Objective   Physical  Exam  Vitals signs and nursing note reviewed.   Constitutional:       General: She is not in acute distress.     Appearance: She is well-developed. She is not diaphoretic.   HENT:      Head: Normocephalic and atraumatic.      Nose: Nose normal.   Eyes:      General: No scleral icterus.     Conjunctiva/sclera: Conjunctivae normal.   Neck:      Musculoskeletal: Normal range of motion and neck supple.      Vascular: No JVD.   Cardiovascular:      Rate and Rhythm: Normal rate and regular rhythm.      Heart sounds: Normal heart sounds. No murmur. No friction rub. No gallop.    Pulmonary:      Effort: Pulmonary effort is normal. No respiratory distress.      Breath sounds: No wheezing or rales.   Chest:      Chest wall: No tenderness.   Abdominal:      General: There is no distension.      Palpations: Abdomen is soft. There is no mass.      Tenderness: There is no abdominal tenderness. There is no guarding or rebound.   Musculoskeletal:         General: Tenderness present. No deformity.      Comments: Patient with edema of the left foot there is positive dorsalis pedis and posterior tibial pulses.  It is deformed compared to the right foot postoperatively to the boat though patient notes that this is near her new baseline.  Less than 3-second capillary refill on the toes.  Patient has a range of motion of the toes without difficulty.   Lymphadenopathy:      Cervical: No cervical adenopathy.   Skin:     General: Skin is warm and dry.      Capillary Refill: Capillary refill takes less than 2 seconds.      Coloration: Skin is not pale.      Findings: No erythema or rash.   Neurological:      Mental Status: She is alert and oriented to person, place, and time.      Cranial Nerves: No cranial nerve deficit.      Motor: No abnormal muscle tone.   Psychiatric:         Behavior: Behavior normal.         Thought Content: Thought content normal.         Judgment: Judgment normal.         Procedures           ED Course                                  Labs Reviewed - No data to display    US Venous Doppler Lower Extremity Left (duplex)   Final Result   CONCLUSION:   No ultrasound evidence of deep venous thrombosis of the left   lower extremity.      [id number]      Electronically signed by:  David Gamboa MD  2/20/2021 2:24 PM CST   Workstation: [id number]      CT Lower Extremity Left Without Contrast   Final Result   CONCLUSION:   Increased dorsal soft tissue swelling.   Otherwise no significant change.      [id number]      Electronically signed by:  David Gamboa MD  2/20/2021 2:02 PM CST   Workstation: [id number]      No significant findings on CT scan some dorsal soft tissue swelling, patient notes this is baseline for her, there is no redness in that area, no erythema, no warmth.  In general there appears to be no significant change from the prior CT.  There was no read on the patient's prior DVT scans a repeat DVT scan was done on the leg which was negative.  Plan symptomatic care with follow-up with podiatry at least by phone on Monday.          MDM    Final diagnoses:   Foot pain, left            Diony Moreno MD  02/20/21 2357

## 2021-02-20 NOTE — DISCHARGE INSTRUCTIONS
Follow-up with Dr. Lord on Monday for further evaluation and management at least over the phone to let him know of your ER visit with your surgery scheduled for Friday.

## 2021-02-23 ENCOUNTER — LAB (OUTPATIENT)
Dept: LAB | Facility: HOSPITAL | Age: 61
End: 2021-02-23

## 2021-02-23 DIAGNOSIS — Z01.818 PREOP TESTING: Primary | ICD-10-CM

## 2021-02-23 PROCEDURE — C9803 HOPD COVID-19 SPEC COLLECT: HCPCS

## 2021-02-23 PROCEDURE — U0004 COV-19 TEST NON-CDC HGH THRU: HCPCS

## 2021-02-24 ENCOUNTER — APPOINTMENT (OUTPATIENT)
Dept: PREADMISSION TESTING | Facility: HOSPITAL | Age: 61
End: 2021-02-24

## 2021-02-24 ENCOUNTER — OFFICE VISIT (OUTPATIENT)
Dept: FAMILY MEDICINE CLINIC | Facility: CLINIC | Age: 61
End: 2021-02-24

## 2021-02-24 VITALS
HEIGHT: 68 IN | WEIGHT: 262 LBS | SYSTOLIC BLOOD PRESSURE: 142 MMHG | BODY MASS INDEX: 39.71 KG/M2 | DIASTOLIC BLOOD PRESSURE: 80 MMHG | HEART RATE: 86 BPM | RESPIRATION RATE: 18 BRPM | OXYGEN SATURATION: 96 %

## 2021-02-24 DIAGNOSIS — M54.41 CHRONIC RIGHT-SIDED LOW BACK PAIN WITH RIGHT-SIDED SCIATICA: Chronic | ICD-10-CM

## 2021-02-24 DIAGNOSIS — M79.672 FOOT PAIN, LEFT: ICD-10-CM

## 2021-02-24 DIAGNOSIS — G89.29 CHRONIC RIGHT-SIDED LOW BACK PAIN WITH RIGHT-SIDED SCIATICA: Chronic | ICD-10-CM

## 2021-02-24 LAB
ANION GAP SERPL CALCULATED.3IONS-SCNC: 13 MMOL/L (ref 5–15)
BUN SERPL-MCNC: 14 MG/DL (ref 6–20)
BUN/CREAT SERPL: 16.3 (ref 7–25)
CALCIUM SPEC-SCNC: 9.7 MG/DL (ref 8.6–10.5)
CHLORIDE SERPL-SCNC: 103 MMOL/L (ref 98–107)
CO2 SERPL-SCNC: 25 MMOL/L (ref 22–29)
CREAT SERPL-MCNC: 0.86 MG/DL (ref 0.57–1)
GFR SERPL CREATININE-BSD FRML MDRD: 68 ML/MIN/1.73
GLUCOSE SERPL-MCNC: 145 MG/DL (ref 65–99)
POTASSIUM SERPL-SCNC: 3.4 MMOL/L (ref 3.5–5.2)
SARS-COV-2 ORF1AB RESP QL NAA+PROBE: NOT DETECTED
SODIUM SERPL-SCNC: 141 MMOL/L (ref 136–145)

## 2021-02-24 PROCEDURE — 80048 BASIC METABOLIC PNL TOTAL CA: CPT

## 2021-02-24 PROCEDURE — 99442 PR PHYS/QHP TELEPHONE EVALUATION 11-20 MIN: CPT | Performed by: NURSE PRACTITIONER

## 2021-02-24 RX ORDER — SODIUM CHLORIDE 9 MG/ML
1000 INJECTION, SOLUTION INTRAVENOUS CONTINUOUS
Status: CANCELLED | OUTPATIENT
Start: 2021-02-26

## 2021-02-24 NOTE — PROGRESS NOTES
Subjective   Ariana Martinez is a 61 y.o. female.   You have chosen to receive care through a telephone visit. Do you consent to use a telephone visit for your medical care today? Yes  12 minutes medical discussion.     Chief Complaint   Patient presents with   • Anxiety     12 week f/u        History of Present Illness   The patient is here by telephonic visit today as she is in preprocedure quarantine I had a foot surgery on 2/26/2021.  It is noted that her COVID-19 preprocedure screen was negative reported today.  She is scheduled for later today to go for preop testing.  Patient presents today for routine 12-week follow-up of chronic pain (stable) of the left foot owing to posttraumatic osteoarthritic changes and several foot surgeries.  Onset more than 1 year ago.  Pain is managed quite well with hydrocodone 7.5 mg with stated satisfaction of level of control.  She is scheduled for pin removal surgery with Dr. Lord on Friday of this week 2/26/2021.  She is advised that he is able to and may well prescribe stronger pain medication than her current hydrocodone postoperatively and that she should use what ever he prescribes until her pain level subsides sufficiently to resume her chronic pain medication as currently prescribed.  It is excepted that this will not be a contract violation due to surgical intervention.  Refill is not due today.  Compliant with use and no adverse effects of medication reported.    Patient also presents today for routine 12-week follow-up of chronic right-sided lower back pain with associated right-sided sciatica (stable).  Onset of pain over 1 year ago.  Pain is managed with hydrocodone with gabapentin for control of sciatica.  She has additional refills available on her gabapentin and the hydrocodone is not due for refill today.  She is compliant with use and there are no reported adverse effects of medication.  She does report adequate pain relief with current medication dose  and frequency.    No additional complaints today.    Parts of most recent relevant visit HPI, ROS  and PE may be carried forward and all are updated as appropriate for current situation.    Past Surgical History:   Procedure Laterality Date   • ABDOMINAL SURGERY     • ANGIOPLASTY      coronary   • BREAST BIOPSY Right    • CARDIAC CATHETERIZATION     • CARDIAC CATHETERIZATION N/A 6/20/2017    Procedure: Right Heart Cath;  Surgeon: Can Kwon MD PhD;  Location: [facility] CATH INVASIVE LOCATION;  Service:    • CARDIAC CATHETERIZATION N/A 2/18/2020    Procedure: Left Heart Cath;  Surgeon: Catalina Cooper MD;  Location: [facility] CATH INVASIVE LOCATION;  Service: Cardiology;  Laterality: N/A;   • CARPAL TUNNEL RELEASE     • CHOLECYSTECTOMY     • COLONOSCOPY N/A 6/24/2020    Procedure: COLONOSCOPY;  Surgeon: Julián Maldonado MD;  Location: [facility] ENDOSCOPY;  Service: Gastroenterology;  Laterality: N/A;   • CORONARY ARTERY BYPASS GRAFT  2005   • ENDOSCOPY N/A 10/19/2018    Procedure: ESOPHAGOGASTRODUODENOSCOPY possible dilation;  Surgeon: Julián Maldonado MD;  Location: [facility] ENDOSCOPY;  Service: Gastroenterology   • ENDOSCOPY N/A 6/24/2020    Procedure: ESOPHAGOGASTRODUODENOSCOPY WED appt please;  Surgeon: Julián Maldonado MD;  Location: [facility] ENDOSCOPY;  Service: Gastroenterology;  Laterality: N/A;   • ENDOSCOPY AND COLONOSCOPY     • FOOT SURGERY      Toes   • FOOT SURGERY     • GASTRIC BANDING      Revision, laparoscopic   • HYSTERECTOMY     • MOUTH SURGERY     • SALPINGO OOPHORECTOMY     • SHOULDER SURGERY     • SUBTALAR ARTHRODESIS Left 1/16/2019    Procedure: LEFT FOOT HARDWARE REMOVAL, FIFTH METATARSAL , OPEN REDUCTION INTERNAL FIXATION, CALCANEAL OSTEOTOMY;  Surgeon: Ignacio Lord DPM;  Location: [facility] OR;  Service: Podiatry   • SUBTALAR ARTHRODESIS Left 10/16/2019    Procedure: foot hardware removal, subtalar joint fusion  possible de/reattachment of achilles tendon        (c-arm);  Surgeon:  Ignacio Lord DPM;  Location: [facility];  Service: Podiatry   • SUBTALAR ARTHRODESIS Left 9/30/2020    Procedure: subtalar, talonavicular joint arthrodesis.  Removal hardware.          (c-arm);  Surgeon: Ignacio Lord DPM;  Location: [facility];  Service: Podiatry;  Laterality: Left;   • TRANSESOPHAGEAL ECHOCARDIOGRAM (LAMONTE)      With color flow      Social History     Socioeconomic History   • Marital status:      Spouse name: Not on file   • Number of children: Not on file   • Years of education: Not on file   • Highest education level: Not on file   Tobacco Use   • Smoking status: Never Smoker   • Smokeless tobacco: Never Used   Substance and Sexual Activity   • Alcohol use: No   • Drug use: No   • Sexual activity: Defer      The following portions of the patient's history were reviewed and updated as appropriate: She  has a past medical history of Angina, class IV (CMS/HCC), Anxiety, Anxiety and depression, Benign paroxysmal positional vertigo, Bladder disorder, Carpal tunnel syndrome, Chronic pain, Coronary atherosclerosis, Depression, Diabetes mellitus (CMS/HCC), Diabetic polyneuropathy (CMS/HCC), Disease of thyroid gland, Elevated cholesterol, Female stress incontinence, Foot pain, left, Full dentures, Gastroparesis, GERD (gastroesophageal reflux disease), Hyperlipidemia, Hypertension, Low back pain, Malaise and fatigue, Multiple joint pain, Obesity, Otalgia, Perforation of tympanic membrane, Postoperative wound infection, Vitamin D deficiency, and Wears glasses..    Review of Systems   Constitutional: Negative.    HENT: Negative.  Negative for congestion.    Eyes: Negative.  Negative for blurred vision, double vision, photophobia and visual disturbance.   Respiratory: Negative.  Negative for cough, shortness of breath, wheezing and stridor.    Cardiovascular: Negative.  Negative for chest pain, palpitations and leg swelling.   Gastrointestinal: Negative.  Negative for abdominal distention,  abdominal pain, blood in stool, constipation, diarrhea, nausea, vomiting, GERD and indigestion.   Endocrine: Negative.  Negative for cold intolerance and heat intolerance.   Genitourinary: Negative.  Negative for dysuria, flank pain, frequency and urinary incontinence.   Musculoskeletal: Positive for arthralgias and back pain.   Skin: Negative.    Allergic/Immunologic: Negative.  Negative for immunocompromised state.   Neurological: Negative.    Hematological: Negative.    Psychiatric/Behavioral: Negative.  Negative for agitation, behavioral problems, decreased concentration, dysphoric mood, hallucinations, self-injury, sleep disturbance, suicidal ideas, negative for hyperactivity, depressed mood and stress. The patient is not nervous/anxious.    All other systems reviewed and are negative.    PHQ-9 Depression Screening  Little interest or pleasure in doing things? 0   Feeling down, depressed, or hopeless? 0   Trouble falling or staying asleep, or sleeping too much?     Feeling tired or having little energy?     Poor appetite or overeating?     Feeling bad about yourself - or that you are a failure or have let yourself or your family down?     Trouble concentrating on things, such as reading the newspaper or watching television?     Moving or speaking so slowly that other people could have noticed? Or the opposite - being so fidgety or restless that you have been moving around a lot more than usual?     Thoughts that you would be better off dead, or of hurting yourself in some way?     PHQ-9 Total Score 0   If you checked off any problems, how difficult have these problems made it for you to do your work, take care of things at home, or get along with other people?      Patient understands the risks associated with this controlled medication, including tolerance and addiction.  Patient also agrees to only obtain this medication from me, and not from a another provider, unless that provider is covering for me in my  absence.  Patient also agrees to be compliant in dosing, and not self adjust the dose of medication.  A signed controlled substance agreement is on file, and the patient has received a controlled substance education sheet at this a previous visit.  The patient has also signed a consent for treatment with a controlled substance as per [facility] policy. LESTER was obtained.    Objective   Physical Exam  Vitals signs and nursing note reviewed.   Constitutional:       General: She is not in acute distress.     Appearance: She is obese. She is not ill-appearing (Telephonic visit today, patient presents no signs of illness and sounds quite well.).   Pulmonary:      Effort: Pulmonary effort is normal. No respiratory distress.      Breath sounds: No stridor.      Comments: Able to speak in full sentences without dyspnea no spontaneous cough during conversation.  Neurological:      Mental Status: She is alert and oriented to person, place, and time. Mental status is at baseline.   Psychiatric:         Mood and Affect: Mood normal.         Behavior: Behavior normal.         Thought Content: Thought content normal.         Judgment: Judgment normal.           Assessment/Plan   Diagnoses and all orders for this visit:    1. Chronic right-sided low back pain with right-sided sciatica  Comments:  controlled with Norco    2. Foot pain, left      Return in about 12 weeks (around 5/19/2021), or if symptoms worsen or fail to improve.               This document has been electronically signed by BEBE Palomino on February 24, 2021 08:56 CST

## 2021-02-25 ENCOUNTER — ANESTHESIA EVENT (OUTPATIENT)
Dept: PERIOP | Facility: HOSPITAL | Age: 61
End: 2021-02-25

## 2021-02-26 ENCOUNTER — HOSPITAL ENCOUNTER (OUTPATIENT)
Facility: HOSPITAL | Age: 61
Setting detail: HOSPITAL OUTPATIENT SURGERY
Discharge: HOME | End: 2021-02-26
Attending: PODIATRIST | Admitting: PODIATRIST

## 2021-02-26 ENCOUNTER — APPOINTMENT (OUTPATIENT)
Dept: GENERAL RADIOLOGY | Facility: HOSPITAL | Age: 61
End: 2021-02-26

## 2021-02-26 ENCOUNTER — ANESTHESIA (OUTPATIENT)
Dept: PERIOP | Facility: HOSPITAL | Age: 61
End: 2021-02-26

## 2021-02-26 VITALS
SYSTOLIC BLOOD PRESSURE: 154 MMHG | TEMPERATURE: 97.8 F | HEIGHT: 68 IN | DIASTOLIC BLOOD PRESSURE: 76 MMHG | OXYGEN SATURATION: 95 % | RESPIRATION RATE: 20 BRPM | BODY MASS INDEX: 39.83 KG/M2 | HEART RATE: 79 BPM | WEIGHT: 262.79 LBS

## 2021-02-26 DIAGNOSIS — T85.848A PAIN FROM IMPLANTED HARDWARE, INITIAL ENCOUNTER: ICD-10-CM

## 2021-02-26 DIAGNOSIS — M19.079 ANKLE ARTHRITIS: ICD-10-CM

## 2021-02-26 LAB
GLUCOSE BLDC GLUCOMTR-MCNC: 213 MG/DL (ref 70–130)
GLUCOSE BLDC GLUCOMTR-MCNC: 226 MG/DL (ref 70–130)
QT INTERVAL: 426 MS
QTC INTERVAL: 485 MS

## 2021-02-26 PROCEDURE — 93005 ELECTROCARDIOGRAM TRACING: CPT | Performed by: ANESTHESIOLOGY

## 2021-02-26 PROCEDURE — 25010000002 PROPOFOL 10 MG/ML EMULSION: Performed by: NURSE ANESTHETIST, CERTIFIED REGISTERED

## 2021-02-26 PROCEDURE — 20680 REMOVAL OF IMPLANT DEEP: CPT | Performed by: PODIATRIST

## 2021-02-26 PROCEDURE — 82962 GLUCOSE BLOOD TEST: CPT

## 2021-02-26 PROCEDURE — 93010 ELECTROCARDIOGRAM REPORT: CPT | Performed by: INTERNAL MEDICINE

## 2021-02-26 PROCEDURE — 76000 FLUOROSCOPY <1 HR PHYS/QHP: CPT

## 2021-02-26 PROCEDURE — 28118 REMOVAL OF HEEL BONE: CPT | Performed by: PODIATRIST

## 2021-02-26 PROCEDURE — 25010000002 ROPIVACAINE PER 1 MG: Performed by: ANESTHESIOLOGY

## 2021-02-26 PROCEDURE — 25010000002 ONDANSETRON PER 1 MG: Performed by: NURSE ANESTHETIST, CERTIFIED REGISTERED

## 2021-02-26 PROCEDURE — 25010000002 CEFAZOLIN PER 500 MG: Performed by: PODIATRIST

## 2021-02-26 PROCEDURE — C1713 ANCHOR/SCREW BN/BN,TIS/BN: HCPCS | Performed by: PODIATRIST

## 2021-02-26 PROCEDURE — 29892 ARTHRS AID RPR OD LES/TIB FX: CPT | Performed by: PODIATRIST

## 2021-02-26 PROCEDURE — 25010000002 MIDAZOLAM PER 1 MG: Performed by: NURSE ANESTHETIST, CERTIFIED REGISTERED

## 2021-02-26 PROCEDURE — 25010000002 FENTANYL CITRATE (PF) 100 MCG/2ML SOLUTION: Performed by: NURSE ANESTHETIST, CERTIFIED REGISTERED

## 2021-02-26 DEVICE — INJECTABLE HA BONE CEMENT
Type: IMPLANTABLE DEVICE | Site: ANKLE | Status: FUNCTIONAL
Brand: HYDROSET XT

## 2021-02-26 RX ORDER — FENTANYL CITRATE 50 UG/ML
INJECTION, SOLUTION INTRAMUSCULAR; INTRAVENOUS AS NEEDED
Status: DISCONTINUED | OUTPATIENT
Start: 2021-02-26 | End: 2021-02-26 | Stop reason: SURG

## 2021-02-26 RX ORDER — MIDAZOLAM HYDROCHLORIDE 1 MG/ML
INJECTION INTRAMUSCULAR; INTRAVENOUS AS NEEDED
Status: DISCONTINUED | OUTPATIENT
Start: 2021-02-26 | End: 2021-02-26 | Stop reason: SURG

## 2021-02-26 RX ORDER — ONDANSETRON 2 MG/ML
4 INJECTION INTRAMUSCULAR; INTRAVENOUS ONCE AS NEEDED
Status: COMPLETED | OUTPATIENT
Start: 2021-02-26 | End: 2021-02-26

## 2021-02-26 RX ORDER — SODIUM CHLORIDE 9 MG/ML
1000 INJECTION, SOLUTION INTRAVENOUS CONTINUOUS
Status: DISCONTINUED | OUTPATIENT
Start: 2021-02-26 | End: 2021-02-26 | Stop reason: HOSPADM

## 2021-02-26 RX ORDER — BUPIVACAINE HYDROCHLORIDE 5 MG/ML
INJECTION, SOLUTION EPIDURAL; INTRACAUDAL AS NEEDED
Status: DISCONTINUED | OUTPATIENT
Start: 2021-02-26 | End: 2021-02-26 | Stop reason: HOSPADM

## 2021-02-26 RX ORDER — ROPIVACAINE HYDROCHLORIDE 5 MG/ML
INJECTION, SOLUTION EPIDURAL; INFILTRATION; PERINEURAL
Status: COMPLETED | OUTPATIENT
Start: 2021-02-26 | End: 2021-02-26

## 2021-02-26 RX ORDER — MEPERIDINE HYDROCHLORIDE 25 MG/ML
12.5 INJECTION INTRAMUSCULAR; INTRAVENOUS; SUBCUTANEOUS
Status: DISCONTINUED | OUTPATIENT
Start: 2021-02-26 | End: 2021-02-26 | Stop reason: HOSPADM

## 2021-02-26 RX ORDER — PROPOFOL 10 MG/ML
VIAL (ML) INTRAVENOUS AS NEEDED
Status: DISCONTINUED | OUTPATIENT
Start: 2021-02-26 | End: 2021-02-26 | Stop reason: SURG

## 2021-02-26 RX ORDER — LIDOCAINE HYDROCHLORIDE 20 MG/ML
INJECTION, SOLUTION INFILTRATION; PERINEURAL AS NEEDED
Status: DISCONTINUED | OUTPATIENT
Start: 2021-02-26 | End: 2021-02-26 | Stop reason: SURG

## 2021-02-26 RX ORDER — PROMETHAZINE HYDROCHLORIDE 25 MG/1
25 TABLET ORAL ONCE AS NEEDED
Status: DISCONTINUED | OUTPATIENT
Start: 2021-02-26 | End: 2021-02-26 | Stop reason: HOSPADM

## 2021-02-26 RX ORDER — ROCURONIUM BROMIDE 10 MG/ML
INJECTION, SOLUTION INTRAVENOUS AS NEEDED
Status: DISCONTINUED | OUTPATIENT
Start: 2021-02-26 | End: 2021-02-26 | Stop reason: SURG

## 2021-02-26 RX ORDER — BUPIVACAINE HCL/0.9 % NACL/PF 0.1 %
2 PLASTIC BAG, INJECTION (ML) EPIDURAL ONCE
Status: COMPLETED | OUTPATIENT
Start: 2021-02-26 | End: 2021-02-26

## 2021-02-26 RX ORDER — PROMETHAZINE HYDROCHLORIDE 25 MG/1
25 SUPPOSITORY RECTAL ONCE AS NEEDED
Status: DISCONTINUED | OUTPATIENT
Start: 2021-02-26 | End: 2021-02-26 | Stop reason: HOSPADM

## 2021-02-26 RX ORDER — ONDANSETRON 2 MG/ML
INJECTION INTRAMUSCULAR; INTRAVENOUS AS NEEDED
Status: DISCONTINUED | OUTPATIENT
Start: 2021-02-26 | End: 2021-02-26 | Stop reason: SURG

## 2021-02-26 RX ADMIN — PROPOFOL 150 MG: 10 INJECTION, EMULSION INTRAVENOUS at 09:01

## 2021-02-26 RX ADMIN — ROPIVACAINE HYDROCHLORIDE 30 ML: 5 INJECTION, SOLUTION EPIDURAL; INFILTRATION; PERINEURAL at 09:02

## 2021-02-26 RX ADMIN — ONDANSETRON 4 MG: 2 INJECTION INTRAMUSCULAR; INTRAVENOUS at 11:03

## 2021-02-26 RX ADMIN — ROCURONIUM BROMIDE 5 MG: 50 INJECTION INTRAVENOUS at 08:59

## 2021-02-26 RX ADMIN — LIDOCAINE HYDROCHLORIDE 100 MG: 20 INJECTION, SOLUTION INFILTRATION; PERINEURAL at 09:01

## 2021-02-26 RX ADMIN — MIDAZOLAM HYDROCHLORIDE 2 MG: 2 INJECTION, SOLUTION INTRAMUSCULAR; INTRAVENOUS at 08:43

## 2021-02-26 RX ADMIN — FENTANYL CITRATE 25 MCG: 50 INJECTION INTRAMUSCULAR; INTRAVENOUS at 10:57

## 2021-02-26 RX ADMIN — FENTANYL CITRATE 50 MCG: 50 INJECTION INTRAMUSCULAR; INTRAVENOUS at 08:51

## 2021-02-26 RX ADMIN — FENTANYL CITRATE 25 MCG: 50 INJECTION INTRAMUSCULAR; INTRAVENOUS at 09:48

## 2021-02-26 RX ADMIN — Medication 2 G: at 08:56

## 2021-02-26 RX ADMIN — ONDANSETRON 4 MG: 2 INJECTION INTRAMUSCULAR; INTRAVENOUS at 12:04

## 2021-02-26 RX ADMIN — SODIUM CHLORIDE 1000 ML: 9 INJECTION, SOLUTION INTRAVENOUS at 07:40

## 2021-02-26 NOTE — ANESTHESIA PROCEDURE NOTES
Peripheral Block      Patient location during procedure: OR  Start time: 2/26/2021 8:55 AM  Stop time: 2/26/2021 9:02 AM  Reason for block: procedure for pain, at surgeon's request, post-op pain management and secondary anesthetic  Performed by  Anesthesiologist: Renetta Yost DO  Preanesthetic Checklist  Completed: patient identified, site marked, surgical consent, pre-op evaluation, timeout performed, IV checked, risks and benefits discussed and monitors and equipment checked  Prep:  Pt Position: supine  Sterile barriers:cap, gloves, sterile barriers and mask  Prep: ChloraPrep  Patient monitoring: blood pressure monitoring, continuous pulse oximetry and EKG  Procedure  Sedation:yes  Performed under: local infiltration  Guidance:ultrasound guided  ULTRASOUND INTERPRETATION. Using ultrasound guidance a 21 G gauge needle was placed in close proximity to the nerve, at which point, under ultrasound guidance anesthetic was injected in the area of the nerve and spread of the anesthesia was seen on ultrasound in close proximity thereto.  There were no abnormalities seen on ultrasound; a digital image was taken; and the patient tolerated the procedure with no complications. Images:still images obtained, printed/placed on chart    Laterality:left  Block Type:sciatic  Injection Technique:single-shot  Needle Type:echogenic  Needle Gauge:21 G  Resistance on Injection: none  Catheter Size:20 G  Cath Depth at skin: 5 cm    Medications Used: ropivacaine (NAROPIN) 0.5 % injection, 30 mL      Post Assessment  Injection Assessment: negative aspiration for heme, no paresthesia on injection and incremental injection  Patient Tolerance:comfortable throughout block  Complications:no  Additional Notes  U/S used throughout and needle seen throughout  No complications during procedure

## 2021-02-26 NOTE — ADDENDUM NOTE
Addendum  created 02/26/21 1303 by Rohan Moe CRNA    Clinical Note Signed, Delete clinical note, Diagnosis association updated, Intraprocedure Blocks edited, Order Canceled from Note

## 2021-02-26 NOTE — ANESTHESIA POSTPROCEDURE EVALUATION
Patient: Ariana Martinez    Procedure Summary     Date: 02/26/21 Room / Location: [facility] OR  / [facility] OR    Anesthesia Start: 0846 Anesthesia Stop: 1131    Procedure: Left foot hardware removal, ankle arthroscopy, bone grafting , left foot exostectomy (Left Ankle) Diagnosis:       Pain from implanted hardware, initial encounter      Ankle arthritis      (Pain from implanted hardware, initial encounter [T85.848A])      (Ankle arthritis [M19.079])    Surgeon: Ignacio Lord DPM Provider: Renetta Yost DO    Anesthesia Type: general with block ASA Status: 3          Anesthesia Type: general with block    Vitals  No vitals data found for the desired time range.          Post Anesthesia Care and Evaluation    Patient location during evaluation: PACU  Patient participation: complete - patient participated  Level of consciousness: awake and alert  Pain management: adequate  Airway patency: patent  Anesthetic complications: No anesthetic complications  PONV Status: none  Cardiovascular status: acceptable  Respiratory status: acceptable  Hydration status: acceptable

## 2021-02-26 NOTE — ANESTHESIA PREPROCEDURE EVALUATION
Anesthesia Evaluation     Patient summary reviewed and Nursing notes reviewed   no history of anesthetic complications:  NPO Solid Status: > 8 hours  NPO Liquid Status: > 2 hours           Airway   Mallampati: II  TM distance: >3 FB  Neck ROM: full  Possible difficult intubation and Large neck circumference  Comment: General anesthesia 10/16.19, [place] #2, ETT 7.5, Grade 1.  Dental    (+) upper dentures and lower dentures    Pulmonary - negative pulmonary ROS    breath sounds clear to auscultation  (+) decreased breath sounds,   (-) COPD, asthma, sleep apnea, not a smoker    ROS comment: snoresFINDINGS: Low lung volumes.  No overt pulmonary vascular congestion, focal pulmonary  parenchymal opacity, pleural effusion or pneumothorax. Cardiac  silhouette is normal in size. Thoracic aorta contains  atherosclerotic calcification. Sternal suture wires appear stable     IMPRESSION:  CONCLUSION:  No acute pulmonary disease.     Electronically signed by:  Vamshi Rose MD  2/16/2020 10:07 AM  CST Workstation: [employer]  Cardiovascular - normal exam  Exercise tolerance: poor (<4 METS)    ECG reviewed  PT is on anticoagulation therapy  Patient on routine beta blocker and Beta blocker given within 24 hours of surgery  Rhythm: regular  Rate: normal    (+) hypertension poorly controlled 2 medications or greater, valvular problems/murmurs TI, CAD, CABG >6 Months, cardiac stents more than 12 months ago CHF Diastolic >=55%, hyperlipidemia,   (-) dysrhythmias, angina, murmur, PVD, DVT    ROS comment: Rhythm: sinus rhythm  Rate: normal  BPM: 72  ST Segments: ST segments normal  Clinical impression: normal ECG  Interpreted by German      · Left Ventricle: Estimated EF appears to be in the range of 51 - 55%. Normal left ventricular cavity size noted  · There is moderate eccentric hypertrophy of the left ventricular cavity. Left ventricular diastolic dysfunction is noted (grade II w/high LAP) consistent with  pseudonormalization.  · Right Ventricle: Normal right ventricular wall thickness, systolic function and septal motion noted. Right ventricular cavity is borderline dilated  · No evidence of a patent foramen ovale. No evidence of an atrial septal defect present. Saline test results are negative.  · The aortic valve is abnormal with mild calcification of the right coronary cusp.  · Mitral Valve: The mitral valve is abnormal in structure. There is anterior leaflet thickening present. Mild mitral valve regurgitation is present.  · Trace to mild tricuspid valve regurgitation is present. Estimated right ventricular systolic pressure from tricuspid regurgitation is moderately elevated (45-55 mmHg)  · Mild pulmonary hypertension is present.  · There is no evidence of pericardial effusion.   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  The LAD is total from the midportion on.  The only flow given to the LAD region which also would include the apex in the inferoapical portion on a wraparound LAD is in the proximal LAD going into the diagonal branch.  This is been successfully stented with drug-eluting stents x2.  She has flow into the circumflex and right coronary artery and now except for the mid LAD portion is completely revascularized once again.    Neuro/Psych  (+) numbness (both feet), psychiatric history Anxiety and Depression,     (-) seizures, TIA, CVA, headaches  GI/Hepatic/Renal/Endo    (+) obesity, morbid obesity, GERD well controlled,  diabetes mellitus (glu 208) type 2 poorly controlled using insulin,   (-) hepatitis, liver disease, no renal disease    Musculoskeletal         ROS comment: Left foot  Abdominal   (+) obese,    Substance History - negative use     OB/GYN negative ob/gyn ROS          Other   arthritis,      (-) history of cancer  ROS/Med Hx Other: Plavix last taken 2/23/2021    TTE 8/18/2020:  · Technically difficult study secondary to body habitus. Definity contrast utilized.  · Left ventricular systolic function is normal at 61-65%. Mild concentric hypertrophy with indeterminate diastolic function.  · Right ventricular systolic function is normal. There is mild right ventricular hypertrophy.  · Mild thickening and calcification of a likely trileaflet aortic valve.  · No evidence of pulmonary hypertension.    Left Heart Cath 2/18/2020:  Conclusion/Plan: This patient who had a LIMA bypass in 2005 that is nonfunctioning.  The LAD is total from the midportion on.  The only flow given to the LAD region which also would include the apex in the inferoapical portion on a wraparound LAD is in the proximal LAD going into the diagonal branch.  This is been successfully stented with drug-eluting stents x2.  She has flow into the circumflex and right coronary artery and now except for the mid LAD portion is completely revascularized once again.     She will need to be on dual antiplatelet therapy for 1 year given these are drug-eluting stent.  She will be on Angiomax for 4 hours tonight before a CTs are done and sheaths are pulled.    Hx of pulmonary htn per pt although no increased pressures of right heart on last echo    CABG 2005      Phys Exam Other: Final airway type: endotracheal airway        Successful airway: ETT  Cuffed: yes   Successful intubation technique: direct laryngoscopy and RSI  Facilitating devices/methods: intubating stylet  Blade: [place]  Blade size: 2  ETT size (mm): 7.5  Cormack-Lehane Classification: grade I - full view of glottis  Placement verified by: chest auscultation and capnometry   Measured from: lips  ETT/EBT  to lips (cm): 21  Number of attempts at approach: 1  Assessment: lips, teeth, and gum same as pre-op and atraumatic intubation                   Anesthesia  Plan    ASA 3     general with block   (Discussed peripheral nerve block(popliteal) for post op pain relief and patient understands possible complications,risks and agrees.    ETT due to hx of gastroparesis )  intravenous induction     Anesthetic plan, all risks, benefits, and alternatives have been provided, discussed and informed consent has been obtained with: patient.  Use of blood products discussed with patient  Consented to blood products.   Plan discussed with CRNA.

## 2021-02-26 NOTE — ANESTHESIA PROCEDURE NOTES
Airway  Urgency: elective    Date/Time: 2/26/2021 9:01 AM  Airway not difficult    General Information and Staff    Patient location during procedure: OR    Indications and Patient Condition  Indications for airway management: airway protection    Preoxygenated: yes  MILS maintained throughout  Mask difficulty assessment: 1 - vent by mask    Final Airway Details  Final airway type: endotracheal airway      Successful airway: ETT  Cuffed: yes   Successful intubation technique: direct laryngoscopy  Facilitating devices/methods: intubating stylet  Endotracheal tube insertion site: oral  Blade: [place]ayne  Blade size: 3  ETT size (mm): 7.0  Cormack-Lehane Classification: grade IIa - partial view of glottis  Placement verified by: chest auscultation and capnometry   Measured from: gums  ETT/EBT to gums (cm): 21  Number of attempts at approach: 1  Assessment: lips, teeth, and gum same as pre-op and atraumatic intubation

## 2021-03-01 LAB
LAB AP CASE REPORT: NORMAL
PATH REPORT.FINAL DX SPEC: NORMAL

## 2021-03-02 ENCOUNTER — OFFICE VISIT (OUTPATIENT)
Dept: PODIATRY | Facility: CLINIC | Age: 61
End: 2021-03-02

## 2021-03-02 ENCOUNTER — TELEPHONE (OUTPATIENT)
Dept: PODIATRY | Facility: CLINIC | Age: 61
End: 2021-03-02

## 2021-03-02 VITALS — WEIGHT: 262.79 LBS | BODY MASS INDEX: 39.83 KG/M2 | OXYGEN SATURATION: 97 % | HEIGHT: 68 IN | HEART RATE: 70 BPM

## 2021-03-02 DIAGNOSIS — M19.079 ANKLE ARTHRITIS: ICD-10-CM

## 2021-03-02 DIAGNOSIS — E11.42 DIABETIC POLYNEUROPATHY ASSOCIATED WITH TYPE 2 DIABETES MELLITUS (HCC): ICD-10-CM

## 2021-03-02 DIAGNOSIS — T85.848D PAIN FROM IMPLANTED HARDWARE, SUBSEQUENT ENCOUNTER: Primary | ICD-10-CM

## 2021-03-02 DIAGNOSIS — Q66.70 PES CAVUS: ICD-10-CM

## 2021-03-02 DIAGNOSIS — M19.079 ANKLE ARTHRITIS: Primary | ICD-10-CM

## 2021-03-02 PROCEDURE — 99024 POSTOP FOLLOW-UP VISIT: CPT | Performed by: PODIATRIST

## 2021-03-02 RX ORDER — TRAMADOL HYDROCHLORIDE 50 MG/1
50 TABLET ORAL EVERY 6 HOURS PRN
Qty: 30 TABLET | Refills: 0 | Status: SHIPPED | OUTPATIENT
Start: 2021-03-02 | End: 2021-04-29

## 2021-03-02 RX ORDER — HYDROCODONE BITARTRATE AND ACETAMINOPHEN 7.5; 325 MG/1; MG/1
1 TABLET ORAL EVERY 4 HOURS PRN
Qty: 40 TABLET | Refills: 0 | Status: SHIPPED | OUTPATIENT
Start: 2021-03-02 | End: 2021-03-02

## 2021-03-02 NOTE — TELEPHONE ENCOUNTER
PT REQUESTS A CALL BACK RE INSURANCE WONT COVER [place]. REQUESTS SOMETHING ELSE BE CALLED IN FOR HER.  CALL BACK #[phone].  THANK  YOU.

## 2021-03-02 NOTE — OP NOTE
SURGEON: Ignacio Lord DPM    ASSISTANT: Maira Epstein, CST     PREOPERATIVE DIAGNOSES:   1. Painful retained orthopedic hardware left foot.    2. Osteochondral defect, left talus.     POSTOPERATIVE DIAGNOSES:    1. Painful retained orthopedic hardware left foot.    2. Osteochondral defect, left talus.   3. Calcaneal exostosis, left    PROCEDURES PERFORMED:   1. Removal of deep hardware, left foot.   2. Arthroscopic ankle debridement with microfracture of talar dome chondral defect.   3. Excision calcaneal exostosis, left    ANESTHESIA: General with popliteal block.     HEMOSTASIS: Left thigh pneumatic tourniquet, per nursing record.     ESTIMATED BLOOD LOSS: Less than 10 mL.    MATERIALS: 10 mL Andrew HydroSet calcium phosphate grafting material.      INJECTABLES: 10 mL of 0.5% Marcaine plain.     SPECIMEN: None.      COMPLICATIONS: None.     INDICATIONS:  This is a patient treated on an outpatient basis for chronic left foot and ankle pain, and instability. She has undergone procedures to her left foot, most recently including arthrodesis of the talonavicular and subtalar joint. Recent CT scan did display increased union across these joints compared to prior scan; however, she has had some graft subsidence and subsequent loosening of one of her subtalar joint screws. She presents today for hardware removal, but also arthroscopic evaluation of the ankle joint, as there was noted to be small fracturing due to this loosened hardware. All risks, benefits and potential complications were described in detail. No guarantee was given or implied at any time. She has been n.p.o. since midnight. Informed consent has been obtained and located in the chart.     DESCRIPTION OF PROCEDURE:  Under mild sedation, the patient was brought in the operating room and placed on the operating table in supine position. Following induction of general anesthesia, the left foot and ankle were prepped and draped in the usual aseptic  fashion. Attention was then drawn to the left plantar posterior heel where a small vertical incision was reopened and blunt dissection was carried down to the posterior calcaneus where a mildly loosened cannulated screw was encountered and removed. Next, attention was drawn to the anterior ankle where the standard anteromedial and anterolateral portals were developed. The arthroscope was then inserted into the anteromedial portal and the ankle joint was inspected. There was noted to be significant chondral loss to the anterolateral margin of the talar dome, as well as some small fragmentation and loose body to the anteromedial aspect of the dome. A small shaver and grasper were utilized to debride all loosened edges of the cartilage and excise a couple of small osteochondral bodies. Following debridement of the ankle, a 45 degree microfracture awl was inserted and utilized to fenestrate the subchondral plate at the anterolateral area of cartilaginous loss.      Under direct visualization of the ankle joint through the arthroscope, the Audelia HydroSet was mixed on the back table and the syringe was inserted through the screw hole in posterior calcaneus. The HydroSet was then injected into the talar dome and along the screw path to give additional structural support to the talar dome, as the patient had previously undergone revisional subtalar arthrodesis and had significant bone loss noted on the most recent CT scan. Once this was completed and confirmed that no grafting material was inserted into the ankle joint, we did allow for 15 minutes of drying time. Then the arthroscope was removed.     Finally attention was drawn to the medial ankle just posterior to the medial malleolus where patient was noted to have an exostosis extruding from the medial aspect of the subtalar joint likely related to prior subsidence of bone graft.  The proximal one third of the previous incision was reopened and blunt dissection carried  down to the subcutaneous tissue.  The flexor retinaculum was incised and the tibialis posterior tendon retracted dorsally.  The exostosis was noted just superior to the sustentaculum padmini and seem to be encroaching on the neurovascular bundle.  This was carefully excised and passed off the surgical field.  Intraoperative fluoroscopy was utilized throughout.  Incision was irrigated with Sterile Saline and Closed in Layered Fashion.    The incisions were irrigated with copious amounts of sterile saline and closed with 3.0 and 4.0 nylon. The patient was placed into a dry sterile dressing. The tourniquet was deflated. An immediate hyperemic response to digits 1 through 5 in the left foot. She was taken from the operating room to the recovery room, vital signs stable, neurovascular status intact. We will place her back into a tall cam boot, but keep her non-weightbearing until she follows up in the clinic next week.

## 2021-03-02 NOTE — TELEPHONE ENCOUNTER
Spoke with patient and let her know that I have sent a message to Dr. Lord and it might be tomorrow before this is addressed. Patient was understanding.

## 2021-03-02 NOTE — PROGRESS NOTES
Arianatorsten Martinez  1960  61 y.o. female   PEPITO. Ferguson, APRN 2/24/2021  BS - 141 per pt.    Patient presents today for post op appointment of her left foot.     03/02/2021    Chief Complaint   Patient presents with   • Left Foot - Post-op Follow-up, Dressing Change       History of Present Illness    Ms Martinez is a 62 y/o diabetic female who presents on follow-up of left ankle arthroscopy, exostectomy, hardware removal left foot.  Date of surgery 2/26/2021.  She doing fair overall postoperatively.  Has remained nonweightbearing in her cam boot since time of surgery.  Notes some increase in nocturnal medial ankle pain.    Past Medical History:   Diagnosis Date   • Angina, class IV (CMS/HCC)    • Anxiety    • Anxiety and depression    • Arthritis    • Benign paroxysmal positional vertigo    • Bladder disorder     has bladder stimulator   • Carpal tunnel syndrome    • Chronic pain    • Coronary atherosclerosis     hx CABG 2005.  is followed by Dr Kwon   • Depression    • Diabetes mellitus (CMS/HCC)     Type 2, controlled   • Diabetic polyneuropathy (CMS/HCC)    • Disease of thyroid gland    • Elevated cholesterol    • Female stress incontinence    • Foot pain, left    • Full dentures    • Gastroparesis    • GERD (gastroesophageal reflux disease)    • Hyperlipidemia    • Hypertension    • Low back pain    • Malaise and fatigue    • Multiple joint pain    • Obesity     Refuses to be weighed   • Otalgia     Both   • Perforation of tympanic membrane     Left   • Postoperative wound infection    • Vitamin D deficiency    • Wears glasses     reading         Past Surgical History:   Procedure Laterality Date   • ABDOMINAL SURGERY     • ANGIOPLASTY      coronary   • BREAST BIOPSY Right    • CARDIAC CATHETERIZATION     • CARDIAC CATHETERIZATION N/A 6/20/2017    Procedure: Right Heart Cath;  Surgeon: Can Kwon MD PhD;  Location: [facility] INVASIVE LOCATION;  Service:    • CARDIAC CATHETERIZATION N/A 2/18/2020     Procedure: Left Heart Cath;  Surgeon: Catalina Cooper MD;  Location: [facility] CATH INVASIVE LOCATION;  Service: Cardiology;  Laterality: N/A;   • CARPAL TUNNEL RELEASE     • CHOLECYSTECTOMY     • COLONOSCOPY N/A 6/24/2020    Procedure: COLONOSCOPY;  Surgeon: Julián Maldonado MD;  Location: [facility] ENDOSCOPY;  Service: Gastroenterology;  Laterality: N/A;   • CORONARY ARTERY BYPASS GRAFT  2005   • ENDOSCOPY N/A 10/19/2018    Procedure: ESOPHAGOGASTRODUODENOSCOPY possible dilation;  Surgeon: Julián Maldonado MD;  Location: [facility] ENDOSCOPY;  Service: Gastroenterology   • ENDOSCOPY N/A 6/24/2020    Procedure: ESOPHAGOGASTRODUODENOSCOPY WED appt please;  Surgeon: Julián Maldonado MD;  Location: [facility] ENDOSCOPY;  Service: Gastroenterology;  Laterality: N/A;   • ENDOSCOPY AND COLONOSCOPY     • FOOT SURGERY      Toes   • FOOT SURGERY     • GASTRIC BANDING      Revision, laparoscopic   • HYSTERECTOMY     • MOUTH SURGERY     • SALPINGO OOPHORECTOMY     • SHOULDER SURGERY     • SUBTALAR ARTHRODESIS Left 1/16/2019    Procedure: LEFT FOOT HARDWARE REMOVAL, FIFTH METATARSAL , OPEN REDUCTION INTERNAL FIXATION, CALCANEAL OSTEOTOMY;  Surgeon: Ignacio Lord DPM;  Location: [facility] OR;  Service: Podiatry   • SUBTALAR ARTHRODESIS Left 10/16/2019    Procedure: foot hardware removal, subtalar joint fusion  possible de/reattachment of achilles tendon        (c-arm);  Surgeon: Ignacio Lord DPM;  Location: [facility] OR;  Service: Podiatry   • SUBTALAR ARTHRODESIS Left 9/30/2020    Procedure: subtalar, talonavicular joint arthrodesis.  Removal hardware.          (c-arm);  Surgeon: Ignacio Lord DPM;  Location: [facility] OR;  Service: Podiatry;  Laterality: Left;   • TRANSESOPHAGEAL ECHOCARDIOGRAM (LAMONTE)      With color flow         Family History   Problem Relation Age of Onset   • Diabetes Other    • Heart disease Other    • Hypertension Other    • Heart disease Mother    • Stroke Mother    • Hypertension Mother    •  Diabetes Sister    • Heart disease Sister    • Hypertension Sister    • Heart disease Sister    • Diabetes Sister    • Hypertension Sister    • Diabetes Sister    • Diabetes Sister    • Diabetes Sister    • Diabetes Sister          Social History     Socioeconomic History   • Marital status:      Spouse name: Not on file   • Number of children: Not on file   • Years of education: Not on file   • Highest education level: Not on file   Tobacco Use   • Smoking status: Never Smoker   • Smokeless tobacco: Never Used   Substance and Sexual Activity   • Alcohol use: No   • Drug use: No   • Sexual activity: Defer         Current Outpatient Medications   Medication Sig Dispense Refill   • Accu-Chek Iris Plus test strip USE TO CHECK BLOOD SUGAR 4 TIMES DAILY. ACCUCHECK, E11.9 100 each 3   • ARIPiprazole (ABILIFY) 15 MG tablet Take 1 tablet by mouth Daily. 90 tablet 1   • aspirin 81 MG chewable tablet Chew 81 mg daily.     • atorvastatin (LIPITOR) 80 MG tablet TAKE 1 TABLET BY MOUTH EVERY DAY 90 tablet 1   • BD SHARPS CONTAINER HOME misc 1 each Take As Directed. 1 each 0   • Blood Glucose Monitoring Suppl (ONE TOUCH ULTRA MINI) w/Device kit USE AS DIRECTED TO CHECK BLOOD SUGAR 1 each 0   • Calcium Citrate-Vitamin D (CITRACAL/VITAMIN D) 250-200 MG-UNIT tablet Take 2 tablets by mouth 2 (two) times a day.     • clopidogrel (PLAVIX) 75 MG tablet Take 1 tablet by mouth Daily. 30 tablet 5   • cyanocobalamin 1000 MCG/ML injection INJECT 1 ML INTO THE APPROPRIATE MUSCLE AS DIRECTED BY PRESCRIBER EVERY 28 (TWENTY-EIGHT) DAYS. 3 mL 0   • folic acid (FOLVITE) 1 MG tablet Take 1 tablet by mouth Daily. 90 tablet 1   • furosemide (LASIX) 40 MG tablet TAKE 1 TABLET BY MOUTH EVERY DAY (Patient taking differently: Take 40 mg by mouth Daily.) 90 tablet 1   • gabapentin (NEURONTIN) 400 MG capsule Take 1 capsule by mouth 3 (Three) Times a Day. 90 capsule 2   • GLUCAGON EMERGENCY 1 MG injection USE AS DIRECTED AS NEEDED 1 kit 0   • glucose  "monitor monitoring kit 1 each Daily. accucheck eve meter, E11.9 1 each 0   • hydroCHLOROthiazide (HYDRODIURIL) 12.5 MG tablet Take 12.5 mg by mouth Daily.     • Insulin Glargine (BASAGLAR KWIKPEN) 100 UNIT/ML injection pen Inject 100 units under skin in the the appropriate area as directed every night.  (Patient taking differently: 60 Units Every Night.) 10 pen 2   • Insulin Pen Needle (B-D ULTRAFINE III SHORT PEN) 31G X 8 MM misc USE TO INJECT 4 TIMES A  each 11   • LORazepam (ATIVAN) 0.5 MG tablet Take 1 tablet by mouth Daily As Needed for Anxiety. 30 tablet 0   • meclizine (ANTIVERT) 25 MG tablet Take 1 tablet by mouth 3 (Three) Times a Day As Needed for dizziness. 90 tablet 6   • metoclopramide (REGLAN) 10 MG tablet TAKE 1 TABLET BY MOUTH FOUR TIMES A DAY AS NEEDED 120 tablet 1   • metoprolol succinate XL (TOPROL-XL) 25 MG 24 hr tablet TAKE 1 TABLET BY MOUTH EVERY DAY (Patient taking differently: Take 25 mg by mouth Daily.) 31 tablet 6   • mirtazapine (REMERON) 45 MG tablet TAKE 1 TABLET BY MOUTH EVERY NIGHT. 90 tablet 0   • NOVOLOG FLEXPEN 100 UNIT/ML solution pen-injector sc pen INJECT 60 UNITS BEFORE MEALS 3 TIMES A DAY (Patient taking differently: Inject 60 Units under the skin into the appropriate area as directed 3 (Three) Times a Day With Meals.) 162 mL 3   • ondansetron (ZOFRAN) 8 MG tablet TAKE 1 TABLET BY MOUTH EVERY 8 (EIGHT) HOURS AS NEEDED FOR NAUSEA OR VOMITING. 18 tablet 1   • pantoprazole (PROTONIX) 20 MG EC tablet TAKE 1 TABLET BY MOUTH EVERY DAY (Patient taking differently: Take 20 mg by mouth Daily.) 90 tablet 3   • Syringe/Needle, Disp, (Luer Lock Safety Syringes) 25G X 5/8\" 3 ML misc 1 each Daily. 1 Q28 DAYS 1 each 2   • venlafaxine XR (EFFEXOR-XR) 150 MG 24 hr capsule Take 1 capsule by mouth 2 (Two) Times a Day. 90 capsule 1   • vitamin D (ERGOCALCIFEROL) 1.25 MG (76837 UT) capsule capsule TAKE ONE CAPSULE BY MOUTH EVERY SUNDAY. (Patient taking differently: Take 50,000 Units by " "mouth Every 7 (Seven) Days.) 12 capsule 2   • traMADol (ULTRAM) 50 MG tablet Take 1 tablet by mouth Every 6 (Six) Hours As Needed for Moderate Pain  (pain). 30 tablet 0     No current facility-administered medications for this visit.      Review of Systems   Constitutional: Negative.    HENT: Negative.    Eyes: Negative.    Respiratory: Negative.    Cardiovascular: Negative.    Gastrointestinal: Negative.    Endocrine: Negative.    Genitourinary: Negative.    Musculoskeletal: Negative.    Skin: Negative.    Neurological: Positive for numbness.   Psychiatric/Behavioral: Negative.          OBJECTIVE    Pulse 70   Ht 172.7 cm (68\")   Wt 119 kg (262 lb 12.6 oz)   SpO2 97%   BMI 39.96 kg/m²       Physical Exam   Constitutional: she appears well-developed and well-nourished.   HEENT: Normocephalic. Atraumatic.  CV: No CP. RRR  Resp: Non-labored respirations.  Psychiatric: she has a normal mood and affect. her behavior is normal.           Lower Extremity Exam:  Pulses palpable.  Capillary fill time within normal limits.  Mild to moderate  left foot and ankle edema.  No ecchymosis or erythema.  Negative Conrad  Left foot incisions well approximated with sutures in place.  No signs of infection.  Subtalar, talonavicular joint rectus, rigid. Moderate residual pes cavus  No TTP  Ankle range of motion full without crepitus              Procedures         ASSESSMENT AND PLAN    Diagnoses and all orders for this visit:    1. Pain from implanted hardware, subsequent encounter (Primary)  -     Discontinue: HYDROcodone-acetaminophen (NORCO) 7.5-325 MG per tablet; Take 1 tablet by mouth Every 4 (Four) Hours As Needed for Moderate Pain .  Dispense: 40 tablet; Refill: 0    2. Ankle arthritis  -     Discontinue: HYDROcodone-acetaminophen (NORCO) 7.5-325 MG per tablet; Take 1 tablet by mouth Every 4 (Four) Hours As Needed for Moderate Pain .  Dispense: 40 tablet; Refill: 0    3. Diabetic polyneuropathy associated with type 2 " diabetes mellitus (CMS/HCC)    4. Pes cavus        -Patient doing fairly well postoperatively  -Dressing change today.  To be left clean dry intact x1 week  -Continue cam boot at all times.  We will continue strict nonweightbearing at this time.  -Recheck 1 week for serial radiographs and suture removal.          This document has been electronically signed by Ignacio Lord DPM on March 2, 2021 19:02 CST

## 2021-03-02 NOTE — TELEPHONE ENCOUNTER
Spoke with patient and let her know that Dr. Lord said he would try to call in some ultram for her to take between doses of norco. If insurance wont pay for that either then he said she may take 1.5 of norco and continue what she has been doing. Patient is understanding to this.

## 2021-03-04 DIAGNOSIS — F41.1 GENERALIZED ANXIETY DISORDER: ICD-10-CM

## 2021-03-05 ENCOUNTER — LAB (OUTPATIENT)
Dept: LAB | Facility: HOSPITAL | Age: 61
End: 2021-03-05

## 2021-03-05 DIAGNOSIS — E55.9 VITAMIN D DEFICIENCY: ICD-10-CM

## 2021-03-05 DIAGNOSIS — I10 ESSENTIAL HYPERTENSION: ICD-10-CM

## 2021-03-05 DIAGNOSIS — E78.2 MIXED HYPERLIPIDEMIA: ICD-10-CM

## 2021-03-05 DIAGNOSIS — IMO0002 UNCONTROLLED TYPE 2 DIABETES MELLITUS WITH NEUROLOGIC COMPLICATION, WITH LONG-TERM CURRENT USE OF INSULIN: ICD-10-CM

## 2021-03-05 LAB
25(OH)D3 SERPL-MCNC: 33.7 NG/ML
ALBUMIN SERPL-MCNC: 3.8 G/DL (ref 3.5–5.2)
ALBUMIN UR-MCNC: <1.2 MG/DL
ALBUMIN/GLOB SERPL: 1.2 G/DL
ALP SERPL-CCNC: 132 U/L (ref 39–117)
ALT SERPL W P-5'-P-CCNC: 16 U/L (ref 1–33)
ANION GAP SERPL CALCULATED.3IONS-SCNC: 8.7 MMOL/L (ref 5–15)
AST SERPL-CCNC: 17 U/L (ref 1–32)
BASOPHILS # BLD AUTO: 0.05 10*3/MM3 (ref 0–0.2)
BASOPHILS NFR BLD AUTO: 0.6 % (ref 0–1.5)
BILIRUB SERPL-MCNC: 0.2 MG/DL (ref 0–1.2)
BUN SERPL-MCNC: 11 MG/DL (ref 6–20)
BUN/CREAT SERPL: 13.9 (ref 7–25)
CALCIUM SPEC-SCNC: 9.2 MG/DL (ref 8.6–10.5)
CHLORIDE SERPL-SCNC: 100 MMOL/L (ref 98–107)
CHOLEST SERPL-MCNC: 149 MG/DL (ref 0–200)
CO2 SERPL-SCNC: 27.3 MMOL/L (ref 22–29)
CREAT SERPL-MCNC: 0.79 MG/DL (ref 0.57–1)
CREAT UR-MCNC: 128 MG/DL
CREAT UR-MCNC: 128 MG/DL
DEPRECATED RDW RBC AUTO: 41.7 FL (ref 37–54)
EOSINOPHIL # BLD AUTO: 0.4 10*3/MM3 (ref 0–0.4)
EOSINOPHIL NFR BLD AUTO: 4.8 % (ref 0.3–6.2)
ERYTHROCYTE [DISTWIDTH] IN BLOOD BY AUTOMATED COUNT: 13 % (ref 12.3–15.4)
GFR SERPL CREATININE-BSD FRML MDRD: 74 ML/MIN/1.73
GLOBULIN UR ELPH-MCNC: 3.2 GM/DL
GLUCOSE SERPL-MCNC: 153 MG/DL (ref 65–99)
HBA1C MFR BLD: 8.63 % (ref 4.8–5.6)
HCT VFR BLD AUTO: 37.7 % (ref 34–46.6)
HDLC SERPL-MCNC: 38 MG/DL (ref 40–60)
HGB BLD-MCNC: 12.9 G/DL (ref 12–15.9)
IMM GRANULOCYTES # BLD AUTO: 0.04 10*3/MM3 (ref 0–0.05)
IMM GRANULOCYTES NFR BLD AUTO: 0.5 % (ref 0–0.5)
LDLC SERPL CALC-MCNC: 79 MG/DL (ref 0–100)
LDLC/HDLC SERPL: 1.94 {RATIO}
LYMPHOCYTES # BLD AUTO: 2.72 10*3/MM3 (ref 0.7–3.1)
LYMPHOCYTES NFR BLD AUTO: 32.7 % (ref 19.6–45.3)
MCH RBC QN AUTO: 30.5 PG (ref 26.6–33)
MCHC RBC AUTO-ENTMCNC: 34.2 G/DL (ref 31.5–35.7)
MCV RBC AUTO: 89.1 FL (ref 79–97)
MICROALBUMIN/CREAT UR: NORMAL MG/G{CREAT}
MONOCYTES # BLD AUTO: 0.56 10*3/MM3 (ref 0.1–0.9)
MONOCYTES NFR BLD AUTO: 6.7 % (ref 5–12)
NEUTROPHILS NFR BLD AUTO: 4.55 10*3/MM3 (ref 1.7–7)
NEUTROPHILS NFR BLD AUTO: 54.7 % (ref 42.7–76)
NRBC BLD AUTO-RTO: 0 /100 WBC (ref 0–0.2)
PLATELET # BLD AUTO: 475 10*3/MM3 (ref 140–450)
PMV BLD AUTO: 10 FL (ref 6–12)
POTASSIUM SERPL-SCNC: 3.5 MMOL/L (ref 3.5–5.2)
PROT SERPL-MCNC: 7 G/DL (ref 6–8.5)
PROT UR-MCNC: 13 MG/DL
PROT/CREAT UR: 101.6 MG/G CREA (ref 0–200)
RBC # BLD AUTO: 4.23 10*6/MM3 (ref 3.77–5.28)
SODIUM SERPL-SCNC: 136 MMOL/L (ref 136–145)
TRIGL SERPL-MCNC: 186 MG/DL (ref 0–150)
TSH SERPL DL<=0.05 MIU/L-ACNC: 5.41 UIU/ML (ref 0.27–4.2)
VIT B12 BLD-MCNC: 748 PG/ML (ref 211–946)
VLDLC SERPL-MCNC: 32 MG/DL (ref 5–40)
WBC # BLD AUTO: 8.32 10*3/MM3 (ref 3.4–10.8)

## 2021-03-05 PROCEDURE — 82607 VITAMIN B-12: CPT

## 2021-03-05 PROCEDURE — 82306 VITAMIN D 25 HYDROXY: CPT

## 2021-03-05 PROCEDURE — 80061 LIPID PANEL: CPT

## 2021-03-05 PROCEDURE — 80053 COMPREHEN METABOLIC PANEL: CPT

## 2021-03-05 PROCEDURE — 84156 ASSAY OF PROTEIN URINE: CPT

## 2021-03-05 PROCEDURE — 85025 COMPLETE CBC W/AUTO DIFF WBC: CPT

## 2021-03-05 PROCEDURE — 82570 ASSAY OF URINE CREATININE: CPT

## 2021-03-05 PROCEDURE — 84443 ASSAY THYROID STIM HORMONE: CPT

## 2021-03-05 PROCEDURE — 82043 UR ALBUMIN QUANTITATIVE: CPT

## 2021-03-05 PROCEDURE — 83036 HEMOGLOBIN GLYCOSYLATED A1C: CPT

## 2021-03-05 PROCEDURE — 36415 COLL VENOUS BLD VENIPUNCTURE: CPT

## 2021-03-08 ENCOUNTER — TELEPHONE (OUTPATIENT)
Dept: FAMILY MEDICINE CLINIC | Facility: CLINIC | Age: 61
End: 2021-03-08

## 2021-03-08 RX ORDER — VENLAFAXINE HYDROCHLORIDE 150 MG/1
CAPSULE, EXTENDED RELEASE ORAL
Qty: 90 CAPSULE | Refills: 1 | Status: ON HOLD | OUTPATIENT
Start: 2021-03-08 | End: 2021-03-09

## 2021-03-08 RX ORDER — LORAZEPAM 0.5 MG/1
TABLET ORAL
Qty: 30 TABLET | Refills: 0 | Status: SHIPPED | OUTPATIENT
Start: 2021-03-08 | End: 2021-04-12 | Stop reason: SDUPTHER

## 2021-03-08 NOTE — TELEPHONE ENCOUNTER
Patient has chronic anxiety requiring benzodiazepine use concurrently with chronic pain requiring opiate pain medication and/ or gabapentin. Patient has been educated regarding potential dangers of oversedation and accidental overdose with use of both medications concurrently. Serial assessments of patient has yielded no sign of oversedation or adverse effects of patient, and he/she is advised and aware to notify my office immediately if symptoms do occur, as well as to discontinue use of benzodiazepine medication and opiate medication immediately if that occurs, pending medical evaluation and advice. Patient has been compliant with use of medications, UDS, visits with no adverse effects noted. Patient understands the risks associated with this controlled medication, including tolerance and addiction.  Patient also agrees to only obtain this medication from me, and not from a another provider, unless that provider is covering for me in my absence.  Patient also agrees to be compliant in dosing, and not self adjust the dose of medication.  A signed controlled substance agreement is on file, and the patient has received a controlled substance education sheet at this a previous visit.  The patient has also signed a consent for treatment with a controlled substance as per [facility] policy. LESTER was obtained. Refills were sent for: lorazepam.     This document has been electronically signed by BEBE Palomino on March 8, 2021 10:22 CST

## 2021-03-09 ENCOUNTER — OFFICE VISIT (OUTPATIENT)
Dept: PODIATRY | Facility: CLINIC | Age: 61
End: 2021-03-09

## 2021-03-09 ENCOUNTER — APPOINTMENT (OUTPATIENT)
Dept: ULTRASOUND IMAGING | Facility: HOSPITAL | Age: 61
End: 2021-03-09

## 2021-03-09 ENCOUNTER — OFFICE VISIT (OUTPATIENT)
Dept: ENDOCRINOLOGY | Facility: CLINIC | Age: 61
End: 2021-03-09

## 2021-03-09 ENCOUNTER — HOSPITAL ENCOUNTER (INPATIENT)
Facility: HOSPITAL | Age: 61
LOS: 5 days | Discharge: ANOTHER HOSPITAL | End: 2021-03-14
Attending: STUDENT IN AN ORGANIZED HEALTH CARE EDUCATION/TRAINING PROGRAM | Admitting: HOSPITALIST

## 2021-03-09 ENCOUNTER — LAB (OUTPATIENT)
Dept: LAB | Facility: HOSPITAL | Age: 61
End: 2021-03-09

## 2021-03-09 ENCOUNTER — APPOINTMENT (OUTPATIENT)
Dept: CT IMAGING | Facility: HOSPITAL | Age: 61
End: 2021-03-09

## 2021-03-09 VITALS — HEIGHT: 68 IN | WEIGHT: 262 LBS | OXYGEN SATURATION: 97 % | BODY MASS INDEX: 39.71 KG/M2 | HEART RATE: 68 BPM

## 2021-03-09 VITALS
HEART RATE: 88 BPM | SYSTOLIC BLOOD PRESSURE: 120 MMHG | BODY MASS INDEX: 39.96 KG/M2 | OXYGEN SATURATION: 97 % | HEIGHT: 68 IN | DIASTOLIC BLOOD PRESSURE: 70 MMHG

## 2021-03-09 DIAGNOSIS — M25.572 ACUTE LEFT ANKLE PAIN: ICD-10-CM

## 2021-03-09 DIAGNOSIS — L03.116 CELLULITIS OF LEFT LOWER EXTREMITY: Primary | ICD-10-CM

## 2021-03-09 DIAGNOSIS — M79.662 PAIN OF LEFT CALF: ICD-10-CM

## 2021-03-09 DIAGNOSIS — A41.9 SEPSIS, DUE TO UNSPECIFIED ORGANISM, UNSPECIFIED WHETHER ACUTE ORGAN DYSFUNCTION PRESENT (HCC): ICD-10-CM

## 2021-03-09 DIAGNOSIS — Z74.09 IMPAIRED FUNCTIONAL MOBILITY, BALANCE, GAIT, AND ENDURANCE: ICD-10-CM

## 2021-03-09 DIAGNOSIS — M79.89 LEG SWELLING: Primary | ICD-10-CM

## 2021-03-09 DIAGNOSIS — M79.672 LEFT FOOT PAIN: ICD-10-CM

## 2021-03-09 DIAGNOSIS — E11.42 DIABETIC POLYNEUROPATHY ASSOCIATED WITH TYPE 2 DIABETES MELLITUS (HCC): ICD-10-CM

## 2021-03-09 DIAGNOSIS — E55.9 VITAMIN D DEFICIENCY: ICD-10-CM

## 2021-03-09 DIAGNOSIS — I10 ESSENTIAL HYPERTENSION: ICD-10-CM

## 2021-03-09 DIAGNOSIS — E78.2 MIXED HYPERLIPIDEMIA: ICD-10-CM

## 2021-03-09 DIAGNOSIS — M79.89 LEG SWELLING: ICD-10-CM

## 2021-03-09 DIAGNOSIS — M19.079 ANKLE ARTHRITIS: ICD-10-CM

## 2021-03-09 DIAGNOSIS — Z74.09 IMPAIRED MOBILITY AND ADLS: ICD-10-CM

## 2021-03-09 DIAGNOSIS — M96.0 NONUNION OF SUBTALAR ARTHRODESIS: ICD-10-CM

## 2021-03-09 DIAGNOSIS — Z78.9 IMPAIRED MOBILITY AND ADLS: ICD-10-CM

## 2021-03-09 DIAGNOSIS — IMO0002 UNCONTROLLED TYPE 2 DIABETES MELLITUS WITH NEUROLOGIC COMPLICATION, WITH LONG-TERM CURRENT USE OF INSULIN: Primary | ICD-10-CM

## 2021-03-09 LAB
ALBUMIN SERPL-MCNC: 3.2 G/DL (ref 3.5–5.2)
ALBUMIN/GLOB SERPL: 0.9 G/DL
ALP SERPL-CCNC: 138 U/L (ref 39–117)
ALT SERPL W P-5'-P-CCNC: 28 U/L (ref 1–33)
ANION GAP SERPL CALCULATED.3IONS-SCNC: 13 MMOL/L (ref 5–15)
AST SERPL-CCNC: 27 U/L (ref 1–32)
BASOPHILS # BLD AUTO: 0.07 10*3/MM3 (ref 0–0.2)
BASOPHILS NFR BLD AUTO: 0.3 % (ref 0–1.5)
BILIRUB SERPL-MCNC: 1.2 MG/DL (ref 0–1.2)
BUN SERPL-MCNC: 12 MG/DL (ref 6–20)
BUN/CREAT SERPL: 11 (ref 7–25)
CALCIUM SPEC-SCNC: 8.7 MG/DL (ref 8.6–10.5)
CHLORIDE SERPL-SCNC: 95 MMOL/L (ref 98–107)
CO2 SERPL-SCNC: 25 MMOL/L (ref 22–29)
CREAT SERPL-MCNC: 1.09 MG/DL (ref 0.57–1)
D-DIMER, QUANTITATIVE (MAD,POW, STR): >4000 NG/ML (FEU) (ref 0–470)
D-LACTATE SERPL-SCNC: 2.1 MMOL/L (ref 0.5–2)
DEPRECATED RDW RBC AUTO: 42.8 FL (ref 37–54)
EOSINOPHIL # BLD AUTO: 0.05 10*3/MM3 (ref 0–0.4)
EOSINOPHIL NFR BLD AUTO: 0.2 % (ref 0.3–6.2)
ERYTHROCYTE [DISTWIDTH] IN BLOOD BY AUTOMATED COUNT: 13.2 % (ref 12.3–15.4)
FLUAV RNA RESP QL NAA+PROBE: NOT DETECTED
FLUBV RNA RESP QL NAA+PROBE: NOT DETECTED
GFR SERPL CREATININE-BSD FRML MDRD: 51 ML/MIN/1.73
GLOBULIN UR ELPH-MCNC: 3.7 GM/DL
GLUCOSE BLDC GLUCOMTR-MCNC: 295 MG/DL (ref 70–130)
GLUCOSE SERPL-MCNC: 290 MG/DL (ref 65–99)
HCT VFR BLD AUTO: 35.6 % (ref 34–46.6)
HGB BLD-MCNC: 12.1 G/DL (ref 12–15.9)
HOLD SPECIMEN: NORMAL
IMM GRANULOCYTES # BLD AUTO: 0.24 10*3/MM3 (ref 0–0.05)
IMM GRANULOCYTES NFR BLD AUTO: 1.1 % (ref 0–0.5)
LYMPHOCYTES # BLD AUTO: 1.26 10*3/MM3 (ref 0.7–3.1)
LYMPHOCYTES NFR BLD AUTO: 5.6 % (ref 19.6–45.3)
MCH RBC QN AUTO: 30.4 PG (ref 26.6–33)
MCHC RBC AUTO-ENTMCNC: 34 G/DL (ref 31.5–35.7)
MCV RBC AUTO: 89.4 FL (ref 79–97)
MONOCYTES # BLD AUTO: 2.49 10*3/MM3 (ref 0.1–0.9)
MONOCYTES NFR BLD AUTO: 11.1 % (ref 5–12)
NEUTROPHILS NFR BLD AUTO: 18.28 10*3/MM3 (ref 1.7–7)
NEUTROPHILS NFR BLD AUTO: 81.7 % (ref 42.7–76)
NRBC BLD AUTO-RTO: 0 /100 WBC (ref 0–0.2)
NT-PROBNP SERPL-MCNC: 714.7 PG/ML (ref 0–900)
PLATELET # BLD AUTO: 432 10*3/MM3 (ref 140–450)
PMV BLD AUTO: 9.3 FL (ref 6–12)
POTASSIUM SERPL-SCNC: 4.9 MMOL/L (ref 3.5–5.2)
PROT SERPL-MCNC: 6.9 G/DL (ref 6–8.5)
QT INTERVAL: 334 MS
QTC INTERVAL: 443 MS
RBC # BLD AUTO: 3.98 10*6/MM3 (ref 3.77–5.28)
SARS-COV-2 RNA RESP QL NAA+PROBE: NOT DETECTED
SODIUM SERPL-SCNC: 133 MMOL/L (ref 136–145)
TROPONIN T SERPL-MCNC: <0.01 NG/ML (ref 0–0.03)
WBC # BLD AUTO: 22.39 10*3/MM3 (ref 3.4–10.8)
WHOLE BLOOD HOLD SPECIMEN: NORMAL

## 2021-03-09 PROCEDURE — 25010000002 PIPERACILLIN SOD-TAZOBACTAM PER 1 G: Performed by: PHYSICIAN ASSISTANT

## 2021-03-09 PROCEDURE — 83605 ASSAY OF LACTIC ACID: CPT | Performed by: PHYSICIAN ASSISTANT

## 2021-03-09 PROCEDURE — 86140 C-REACTIVE PROTEIN: CPT

## 2021-03-09 PROCEDURE — 84145 PROCALCITONIN (PCT): CPT | Performed by: STUDENT IN AN ORGANIZED HEALTH CARE EDUCATION/TRAINING PROGRAM

## 2021-03-09 PROCEDURE — 87186 SC STD MICRODIL/AGAR DIL: CPT | Performed by: PODIATRIST

## 2021-03-09 PROCEDURE — 25010000002 HEPARIN (PORCINE) PER 1000 UNITS: Performed by: STUDENT IN AN ORGANIZED HEALTH CARE EDUCATION/TRAINING PROGRAM

## 2021-03-09 PROCEDURE — 93005 ELECTROCARDIOGRAM TRACING: CPT | Performed by: PHYSICIAN ASSISTANT

## 2021-03-09 PROCEDURE — 83880 ASSAY OF NATRIURETIC PEPTIDE: CPT | Performed by: PHYSICIAN ASSISTANT

## 2021-03-09 PROCEDURE — 93010 ELECTROCARDIOGRAM REPORT: CPT | Performed by: INTERNAL MEDICINE

## 2021-03-09 PROCEDURE — 63710000001 INSULIN DETEMIR PER 5 UNITS: Performed by: PODIATRIST

## 2021-03-09 PROCEDURE — 71275 CT ANGIOGRAPHY CHEST: CPT

## 2021-03-09 PROCEDURE — 87040 BLOOD CULTURE FOR BACTERIA: CPT | Performed by: PHYSICIAN ASSISTANT

## 2021-03-09 PROCEDURE — 85652 RBC SED RATE AUTOMATED: CPT

## 2021-03-09 PROCEDURE — 87147 CULTURE TYPE IMMUNOLOGIC: CPT | Performed by: PODIATRIST

## 2021-03-09 PROCEDURE — 99284 EMERGENCY DEPT VISIT MOD MDM: CPT

## 2021-03-09 PROCEDURE — G0378 HOSPITAL OBSERVATION PER HR: HCPCS

## 2021-03-09 PROCEDURE — 25010000002 VANCOMYCIN: Performed by: PHYSICIAN ASSISTANT

## 2021-03-09 PROCEDURE — 82962 GLUCOSE BLOOD TEST: CPT

## 2021-03-09 PROCEDURE — 87147 CULTURE TYPE IMMUNOLOGIC: CPT | Performed by: PHYSICIAN ASSISTANT

## 2021-03-09 PROCEDURE — 63710000001 INSULIN DETEMIR PER 5 UNITS: Performed by: STUDENT IN AN ORGANIZED HEALTH CARE EDUCATION/TRAINING PROGRAM

## 2021-03-09 PROCEDURE — 36415 COLL VENOUS BLD VENIPUNCTURE: CPT

## 2021-03-09 PROCEDURE — 0 IOPAMIDOL PER 1 ML: Performed by: EMERGENCY MEDICINE

## 2021-03-09 PROCEDURE — 93971 EXTREMITY STUDY: CPT

## 2021-03-09 PROCEDURE — 99024 POSTOP FOLLOW-UP VISIT: CPT | Performed by: PODIATRIST

## 2021-03-09 PROCEDURE — 99214 OFFICE O/P EST MOD 30 MIN: CPT | Performed by: NURSE PRACTITIONER

## 2021-03-09 PROCEDURE — 85379 FIBRIN DEGRADATION QUANT: CPT

## 2021-03-09 PROCEDURE — 85025 COMPLETE CBC W/AUTO DIFF WBC: CPT

## 2021-03-09 PROCEDURE — 87636 SARSCOV2 & INF A&B AMP PRB: CPT | Performed by: PHYSICIAN ASSISTANT

## 2021-03-09 PROCEDURE — 87186 SC STD MICRODIL/AGAR DIL: CPT | Performed by: PHYSICIAN ASSISTANT

## 2021-03-09 PROCEDURE — 84484 ASSAY OF TROPONIN QUANT: CPT | Performed by: PHYSICIAN ASSISTANT

## 2021-03-09 PROCEDURE — 87070 CULTURE OTHR SPECIMN AEROBIC: CPT | Performed by: PODIATRIST

## 2021-03-09 PROCEDURE — 87150 DNA/RNA AMPLIFIED PROBE: CPT | Performed by: PHYSICIAN ASSISTANT

## 2021-03-09 PROCEDURE — 80053 COMPREHEN METABOLIC PANEL: CPT | Performed by: PHYSICIAN ASSISTANT

## 2021-03-09 PROCEDURE — 87205 SMEAR GRAM STAIN: CPT | Performed by: PODIATRIST

## 2021-03-09 PROCEDURE — 85025 COMPLETE CBC W/AUTO DIFF WBC: CPT | Performed by: PHYSICIAN ASSISTANT

## 2021-03-09 RX ORDER — METOCLOPRAMIDE 10 MG/1
10 TABLET ORAL 4 TIMES DAILY PRN
Status: DISCONTINUED | OUTPATIENT
Start: 2021-03-09 | End: 2021-03-14 | Stop reason: HOSPADM

## 2021-03-09 RX ORDER — ONDANSETRON 2 MG/ML
4 INJECTION INTRAMUSCULAR; INTRAVENOUS EVERY 6 HOURS PRN
Status: DISCONTINUED | OUTPATIENT
Start: 2021-03-09 | End: 2021-03-14 | Stop reason: HOSPADM

## 2021-03-09 RX ORDER — VENLAFAXINE HYDROCHLORIDE 75 MG/1
150 CAPSULE, EXTENDED RELEASE ORAL 2 TIMES DAILY
Status: DISCONTINUED | OUTPATIENT
Start: 2021-03-09 | End: 2021-03-09 | Stop reason: DRUGHIGH

## 2021-03-09 RX ORDER — NICOTINE POLACRILEX 4 MG
15 LOZENGE BUCCAL
Status: DISCONTINUED | OUTPATIENT
Start: 2021-03-09 | End: 2021-03-14 | Stop reason: HOSPADM

## 2021-03-09 RX ORDER — SODIUM CHLORIDE 0.9 % (FLUSH) 0.9 %
10 SYRINGE (ML) INJECTION AS NEEDED
Status: DISCONTINUED | OUTPATIENT
Start: 2021-03-09 | End: 2021-03-14 | Stop reason: HOSPADM

## 2021-03-09 RX ORDER — ACETAMINOPHEN 325 MG/1
650 TABLET ORAL EVERY 4 HOURS PRN
Status: DISCONTINUED | OUTPATIENT
Start: 2021-03-09 | End: 2021-03-14 | Stop reason: HOSPADM

## 2021-03-09 RX ORDER — VENLAFAXINE HYDROCHLORIDE 150 MG/1
150 CAPSULE, EXTENDED RELEASE ORAL EVERY MORNING
COMMUNITY
End: 2021-06-01

## 2021-03-09 RX ORDER — GABAPENTIN 400 MG/1
400 CAPSULE ORAL 3 TIMES DAILY
Status: DISCONTINUED | OUTPATIENT
Start: 2021-03-09 | End: 2021-03-14 | Stop reason: HOSPADM

## 2021-03-09 RX ORDER — ONDANSETRON 4 MG/1
8 TABLET, FILM COATED ORAL EVERY 8 HOURS PRN
Status: DISCONTINUED | OUTPATIENT
Start: 2021-03-09 | End: 2021-03-14 | Stop reason: HOSPADM

## 2021-03-09 RX ORDER — CLOPIDOGREL BISULFATE 75 MG/1
75 TABLET ORAL DAILY
Status: DISCONTINUED | OUTPATIENT
Start: 2021-03-10 | End: 2021-03-14 | Stop reason: HOSPADM

## 2021-03-09 RX ORDER — FUROSEMIDE 40 MG/1
40 TABLET ORAL DAILY
Status: DISCONTINUED | OUTPATIENT
Start: 2021-03-10 | End: 2021-03-14 | Stop reason: HOSPADM

## 2021-03-09 RX ORDER — METOPROLOL SUCCINATE 25 MG/1
25 TABLET, EXTENDED RELEASE ORAL DAILY
Status: DISCONTINUED | OUTPATIENT
Start: 2021-03-10 | End: 2021-03-14 | Stop reason: HOSPADM

## 2021-03-09 RX ORDER — INSULIN GLARGINE 100 [IU]/ML
60 INJECTION, SOLUTION SUBCUTANEOUS NIGHTLY
COMMUNITY
End: 2021-09-27 | Stop reason: SDUPTHER

## 2021-03-09 RX ORDER — LORAZEPAM 0.5 MG/1
0.5 TABLET ORAL DAILY PRN
Status: DISCONTINUED | OUTPATIENT
Start: 2021-03-09 | End: 2021-03-14 | Stop reason: HOSPADM

## 2021-03-09 RX ORDER — HYDROCODONE BITARTRATE AND ACETAMINOPHEN 10; 325 MG/1; MG/1
1 TABLET ORAL ONCE
Status: COMPLETED | OUTPATIENT
Start: 2021-03-09 | End: 2021-03-09

## 2021-03-09 RX ORDER — VENLAFAXINE HYDROCHLORIDE 75 MG/1
150 CAPSULE, EXTENDED RELEASE ORAL
Status: DISCONTINUED | OUTPATIENT
Start: 2021-03-10 | End: 2021-03-14 | Stop reason: HOSPADM

## 2021-03-09 RX ORDER — ATORVASTATIN CALCIUM 40 MG/1
80 TABLET, FILM COATED ORAL DAILY
Status: DISCONTINUED | OUTPATIENT
Start: 2021-03-10 | End: 2021-03-14 | Stop reason: HOSPADM

## 2021-03-09 RX ORDER — BISACODYL 5 MG/1
5 TABLET, DELAYED RELEASE ORAL DAILY PRN
Status: DISCONTINUED | OUTPATIENT
Start: 2021-03-09 | End: 2021-03-14 | Stop reason: HOSPADM

## 2021-03-09 RX ORDER — MIRTAZAPINE 15 MG/1
45 TABLET, FILM COATED ORAL NIGHTLY
Status: DISCONTINUED | OUTPATIENT
Start: 2021-03-09 | End: 2021-03-14 | Stop reason: HOSPADM

## 2021-03-09 RX ORDER — DEXTROSE MONOHYDRATE 25 G/50ML
25 INJECTION, SOLUTION INTRAVENOUS
Status: DISCONTINUED | OUTPATIENT
Start: 2021-03-09 | End: 2021-03-14 | Stop reason: HOSPADM

## 2021-03-09 RX ORDER — TRAMADOL HYDROCHLORIDE 50 MG/1
50 TABLET ORAL EVERY 6 HOURS PRN
Status: DISCONTINUED | OUTPATIENT
Start: 2021-03-09 | End: 2021-03-12

## 2021-03-09 RX ORDER — LANOLIN ALCOHOL/MO/W.PET/CERES
5 CREAM (GRAM) TOPICAL NIGHTLY PRN
Status: DISCONTINUED | OUTPATIENT
Start: 2021-03-09 | End: 2021-03-14 | Stop reason: HOSPADM

## 2021-03-09 RX ORDER — HEPARIN SODIUM 5000 [USP'U]/ML
5000 INJECTION, SOLUTION INTRAVENOUS; SUBCUTANEOUS EVERY 12 HOURS SCHEDULED
Status: DISCONTINUED | OUTPATIENT
Start: 2021-03-09 | End: 2021-03-10

## 2021-03-09 RX ORDER — DOXYCYCLINE HYCLATE 100 MG/1
100 CAPSULE ORAL 2 TIMES DAILY
Qty: 20 CAPSULE | Refills: 0 | Status: SHIPPED | OUTPATIENT
Start: 2021-03-09 | End: 2021-03-14 | Stop reason: HOSPADM

## 2021-03-09 RX ORDER — ARIPIPRAZOLE 15 MG/1
15 TABLET ORAL DAILY
Status: DISCONTINUED | OUTPATIENT
Start: 2021-03-10 | End: 2021-03-14 | Stop reason: HOSPADM

## 2021-03-09 RX ORDER — ASPIRIN 81 MG/1
81 TABLET ORAL DAILY
Status: DISCONTINUED | OUTPATIENT
Start: 2021-03-10 | End: 2021-03-14 | Stop reason: HOSPADM

## 2021-03-09 RX ORDER — PANTOPRAZOLE SODIUM 20 MG/1
20 TABLET, DELAYED RELEASE ORAL DAILY
Status: DISCONTINUED | OUTPATIENT
Start: 2021-03-10 | End: 2021-03-14 | Stop reason: HOSPADM

## 2021-03-09 RX ORDER — MECLIZINE HYDROCHLORIDE 25 MG/1
25 TABLET ORAL 3 TIMES DAILY PRN
Status: DISCONTINUED | OUTPATIENT
Start: 2021-03-09 | End: 2021-03-14 | Stop reason: HOSPADM

## 2021-03-09 RX ORDER — SODIUM CHLORIDE 0.9 % (FLUSH) 0.9 %
10 SYRINGE (ML) INJECTION EVERY 12 HOURS SCHEDULED
Status: DISCONTINUED | OUTPATIENT
Start: 2021-03-09 | End: 2021-03-14 | Stop reason: HOSPADM

## 2021-03-09 RX ORDER — SODIUM CHLORIDE 9 MG/ML
100 INJECTION, SOLUTION INTRAVENOUS CONTINUOUS
Status: DISCONTINUED | OUTPATIENT
Start: 2021-03-09 | End: 2021-03-10

## 2021-03-09 RX ADMIN — HYDROCODONE BITARTRATE AND ACETAMINOPHEN 1 TABLET: 10; 325 TABLET ORAL at 20:34

## 2021-03-09 RX ADMIN — MIRTAZAPINE 45 MG: 15 TABLET, FILM COATED ORAL at 23:38

## 2021-03-09 RX ADMIN — HEPARIN SODIUM 5000 UNITS: 5000 INJECTION, SOLUTION INTRAVENOUS; SUBCUTANEOUS at 23:38

## 2021-03-09 RX ADMIN — IOPAMIDOL 90 ML: 755 INJECTION, SOLUTION INTRAVENOUS at 20:15

## 2021-03-09 RX ADMIN — SODIUM CHLORIDE 1000 ML: 900 INJECTION, SOLUTION INTRAVENOUS at 19:30

## 2021-03-09 RX ADMIN — INSULIN DETEMIR 60 UNITS: 100 INJECTION, SOLUTION SUBCUTANEOUS at 23:38

## 2021-03-09 RX ADMIN — GABAPENTIN 400 MG: 400 CAPSULE ORAL at 23:37

## 2021-03-09 RX ADMIN — SODIUM CHLORIDE 100 ML/HR: 9 INJECTION, SOLUTION INTRAVENOUS at 23:28

## 2021-03-09 RX ADMIN — VANCOMYCIN HYDROCHLORIDE 2250 MG: 1 INJECTION, POWDER, LYOPHILIZED, FOR SOLUTION INTRAVENOUS at 20:20

## 2021-03-09 NOTE — ED TRIAGE NOTES
"Pt comes in today after being sent over by her doctor after a blood test for blood clots was \"high\". She had surgery on her L foot/ ankle almost two weeks ago. She reports increased pain and burning to the L calf.     She is alert and oriented x4. Tearful at times. Dressing in place to L lower leg, aircast removed. Redness and edema noted to L lower leg. Cap refill <3 seconds, pedal pulse present.   "

## 2021-03-09 NOTE — PROGRESS NOTES
Ariana Luis Martinez  1960  61 y.o. female   C. Ferguson, APRN 2/24/2021  BS - 191 per pt.    Patient returns to clinic for post op appointment left foot    03/09/2021      Chief Complaint   Patient presents with   • Left Foot - Follow-up, Pain       History of Present Illness    Ms Martinez is a 62 y/o diabetic female who presents on follow-up of left ankle arthroscopy, exostectomy, hardware removal left foot.  Date of surgery 2/26/2021.  She had been progressing fairly well however states that last night she was hobbling on one leg between her bed and bedside commode when she stumbled and noticed a sharp pain to the backside of her calf and ankle.    Past Medical History:   Diagnosis Date   • Angina, class IV (CMS/HCC)    • Anxiety    • Anxiety and depression    • Arthritis    • Benign paroxysmal positional vertigo    • Bladder disorder     has bladder stimulator   • Carpal tunnel syndrome    • Chronic pain    • Coronary atherosclerosis     hx CABG 2005.  is followed by Dr Kwon   • Depression    • Diabetes mellitus (CMS/HCC)     Type 2, controlled   • Diabetic polyneuropathy (CMS/HCC)    • Disease of thyroid gland    • Elevated cholesterol    • Female stress incontinence    • Foot pain, left    • Full dentures    • Gastroparesis    • GERD (gastroesophageal reflux disease)    • Hyperlipidemia    • Hypertension    • Low back pain    • Malaise and fatigue    • Multiple joint pain    • Obesity     Refuses to be weighed   • Otalgia     Both   • Perforation of tympanic membrane     Left   • Postoperative wound infection    • Vitamin D deficiency    • Wears glasses     reading         Past Surgical History:   Procedure Laterality Date   • ABDOMINAL SURGERY     • ANGIOPLASTY      coronary   • ANKLE ARTHROSCOPY Left 2/26/2021    Procedure: Left foot hardware removal, ankle arthroscopy, bone grafting , left foot exostectomy;  Surgeon: Ignacio Lord DPM;  Location: [facility];  Service: Podiatry;  Laterality: Left;   •  BREAST BIOPSY Right    • CARDIAC CATHETERIZATION     • CARDIAC CATHETERIZATION N/A 6/20/2017    Procedure: Right Heart Cath;  Surgeon: Can Kwon MD PhD;  Location: [facility] CATH INVASIVE LOCATION;  Service:    • CARDIAC CATHETERIZATION N/A 2/18/2020    Procedure: Left Heart Cath;  Surgeon: Catalina Cooper MD;  Location: [facility] CATH INVASIVE LOCATION;  Service: Cardiology;  Laterality: N/A;   • CARPAL TUNNEL RELEASE     • CHOLECYSTECTOMY     • COLONOSCOPY N/A 6/24/2020    Procedure: COLONOSCOPY;  Surgeon: Julián Maldonado MD;  Location: [facility] ENDOSCOPY;  Service: Gastroenterology;  Laterality: N/A;   • CORONARY ARTERY BYPASS GRAFT  2005   • ENDOSCOPY N/A 10/19/2018    Procedure: ESOPHAGOGASTRODUODENOSCOPY possible dilation;  Surgeon: Julián Maldonado MD;  Location: [facility] ENDOSCOPY;  Service: Gastroenterology   • ENDOSCOPY N/A 6/24/2020    Procedure: ESOPHAGOGASTRODUODENOSCOPY WED appt please;  Surgeon: Julián Maldonado MD;  Location: [facility] ENDOSCOPY;  Service: Gastroenterology;  Laterality: N/A;   • ENDOSCOPY AND COLONOSCOPY     • FOOT SURGERY      Toes   • FOOT SURGERY     • GASTRIC BANDING      Revision, laparoscopic   • HYSTERECTOMY     • MOUTH SURGERY     • SALPINGO OOPHORECTOMY     • SHOULDER SURGERY     • SUBTALAR ARTHRODESIS Left 1/16/2019    Procedure: LEFT FOOT HARDWARE REMOVAL, FIFTH METATARSAL , OPEN REDUCTION INTERNAL FIXATION, CALCANEAL OSTEOTOMY;  Surgeon: Ignacio Lord DPM;  Location: [facility] OR;  Service: Podiatry   • SUBTALAR ARTHRODESIS Left 10/16/2019    Procedure: foot hardware removal, subtalar joint fusion  possible de/reattachment of achilles tendon        (c-arm);  Surgeon: Ignacio Lord DPM;  Location: [facility] OR;  Service: Podiatry   • SUBTALAR ARTHRODESIS Left 9/30/2020    Procedure: subtalar, talonavicular joint arthrodesis.  Removal hardware.          (c-arm);  Surgeon: Ignacio Lord DPM;  Location: [facility] OR;  Service: Podiatry;  Laterality: Left;   •  TRANSESOPHAGEAL ECHOCARDIOGRAM (LAMONTE)      With color flow         Family History   Problem Relation Age of Onset   • Diabetes Other    • Heart disease Other    • Hypertension Other    • Heart disease Mother    • Stroke Mother    • Hypertension Mother    • Diabetes Sister    • Heart disease Sister    • Hypertension Sister    • Heart disease Sister    • Diabetes Sister    • Hypertension Sister    • Diabetes Sister    • Diabetes Sister    • Diabetes Sister    • Diabetes Sister          Social History     Socioeconomic History   • Marital status:      Spouse name: Not on file   • Number of children: Not on file   • Years of education: Not on file   • Highest education level: Not on file   Tobacco Use   • Smoking status: Never Smoker   • Smokeless tobacco: Never Used   Vaping Use   • Vaping Use: Never used   Substance and Sexual Activity   • Alcohol use: No   • Drug use: No   • Sexual activity: Defer         No current facility-administered medications for this visit.     Current Outpatient Medications   Medication Sig Dispense Refill   • Accu-Chek Iris Plus test strip USE TO CHECK BLOOD SUGAR 4 TIMES DAILY. ACCUCHECK, E11.9 100 each 3   • ARIPiprazole (ABILIFY) 15 MG tablet Take 1 tablet by mouth Daily. 90 tablet 1   • aspirin 81 MG chewable tablet Chew 81 mg daily.     • atorvastatin (LIPITOR) 80 MG tablet TAKE 1 TABLET BY MOUTH EVERY DAY 90 tablet 1   • BD SHARPS CONTAINER HOME misc 1 each Take As Directed. 1 each 0   • Blood Glucose Monitoring Suppl (ONE TOUCH ULTRA MINI) w/Device kit USE AS DIRECTED TO CHECK BLOOD SUGAR 1 each 0   • Calcium Citrate-Vitamin D (CITRACAL/VITAMIN D) 250-200 MG-UNIT tablet Take 2 tablets by mouth 2 (two) times a day.     • clopidogrel (PLAVIX) 75 MG tablet Take 1 tablet by mouth Daily. 30 tablet 5   • cyanocobalamin 1000 MCG/ML injection INJECT 1 ML INTO THE APPROPRIATE MUSCLE AS DIRECTED BY PRESCRIBER EVERY 28 (TWENTY-EIGHT) DAYS. 3 mL 0   • folic acid (FOLVITE) 1 MG tablet Take  1 tablet by mouth Daily. 90 tablet 1   • furosemide (LASIX) 40 MG tablet TAKE 1 TABLET BY MOUTH EVERY DAY (Patient taking differently: Take 40 mg by mouth Daily.) 90 tablet 1   • gabapentin (NEURONTIN) 400 MG capsule Take 1 capsule by mouth 3 (Three) Times a Day. 90 capsule 2   • GLUCAGON EMERGENCY 1 MG injection USE AS DIRECTED AS NEEDED 1 kit 0   • glucose monitor monitoring kit 1 each Daily. accucheck eve meter, E11.9 1 each 0   • hydroCHLOROthiazide (HYDRODIURIL) 12.5 MG tablet Take 12.5 mg by mouth Daily.     • Insulin Glargine (BASAGLAR KWIKPEN) 100 UNIT/ML injection pen Inject 100 units under skin in the the appropriate area as directed every night.  (Patient taking differently: 60 Units Every Night.) 10 pen 2   • Insulin Pen Needle (B-D ULTRAFINE III SHORT PEN) 31G X 8 MM misc USE TO INJECT 4 TIMES A  each 11   • LORazepam (ATIVAN) 0.5 MG tablet TAKE 1 TABLET BY MOUTH EVERY DAY AS NEEDED FOR ANXIETY 30 tablet 0   • meclizine (ANTIVERT) 25 MG tablet Take 1 tablet by mouth 3 (Three) Times a Day As Needed for dizziness. 90 tablet 6   • metoclopramide (REGLAN) 10 MG tablet TAKE 1 TABLET BY MOUTH FOUR TIMES A DAY AS NEEDED 120 tablet 1   • metoprolol succinate XL (TOPROL-XL) 25 MG 24 hr tablet TAKE 1 TABLET BY MOUTH EVERY DAY (Patient taking differently: Take 25 mg by mouth Daily.) 31 tablet 6   • mirtazapine (REMERON) 45 MG tablet TAKE 1 TABLET BY MOUTH EVERY NIGHT. 90 tablet 0   • NOVOLOG FLEXPEN 100 UNIT/ML solution pen-injector sc pen INJECT 60 UNITS BEFORE MEALS 3 TIMES A DAY (Patient taking differently: Inject 60 Units under the skin into the appropriate area as directed 3 (Three) Times a Day With Meals.) 162 mL 3   • ondansetron (ZOFRAN) 8 MG tablet TAKE 1 TABLET BY MOUTH EVERY 8 (EIGHT) HOURS AS NEEDED FOR NAUSEA OR VOMITING. 18 tablet 1   • pantoprazole (PROTONIX) 20 MG EC tablet TAKE 1 TABLET BY MOUTH EVERY DAY (Patient taking differently: Take 20 mg by mouth Daily.) 90 tablet 3   •  "Syringe/Needle, Disp, (Luer Lock Safety Syringes) 25G X 5/8\" 3 ML misc 1 each Daily. 1 Q28 DAYS 1 each 2   • traMADol (ULTRAM) 50 MG tablet Take 1 tablet by mouth Every 6 (Six) Hours As Needed for Moderate Pain  (pain). 30 tablet 0   • venlafaxine XR (EFFEXOR-XR) 150 MG 24 hr capsule TAKE 1 CAPSULE BY MOUTH TWICE A DAY 90 capsule 1   • vitamin D (ERGOCALCIFEROL) 1.25 MG (48748 UT) capsule capsule TAKE ONE CAPSULE BY MOUTH EVERY SUNDAY. (Patient taking differently: Take 50,000 Units by mouth Every 7 (Seven) Days.) 12 capsule 2   • doxycycline (VIBRAMYCIN) 100 MG capsule Take 1 capsule by mouth 2 (Two) Times a Day. 20 capsule 0     Facility-Administered Medications Ordered in Other Visits   Medication Dose Route Frequency Provider Last Rate Last Admin   • sodium chloride 0.9 % flush 10 mL  10 mL Intravenous PRN Ben Porter PA-C         Review of Systems   Constitutional: Negative.    HENT: Negative.    Eyes: Negative.    Respiratory: Negative.    Cardiovascular: Negative.    Gastrointestinal: Negative.    Endocrine: Negative.    Genitourinary: Negative.    Musculoskeletal: Negative.    Skin: Negative.    Neurological: Positive for numbness.   Psychiatric/Behavioral: Negative.          OBJECTIVE    Pulse 68   Ht 172.7 cm (68\")   Wt 119 kg (262 lb)   SpO2 97%   BMI 39.84 kg/m²       Physical Exam   Constitutional: she appears well-developed and well-nourished.   HEENT: Normocephalic. Atraumatic.  CV: No CP. RRR  Resp: Non-labored respirations.  Psychiatric: she has a normal mood and affect. her behavior is normal.           Lower Extremity Exam:  Pulses palpable.  Capillary fill time within normal limits.  Moderate  left foot and ankle edema.  Increased erythema to posterior heel and ankle today.  Tenderness palpation of medial ankle and calf.  Left medial ankle and anterior ankle portal incisions well approximated with sutures in place.  No signs of infection.  Posterior heel incision loosely approximated " without any active drainage.  Moderate surrounding erythema.  Subtalar, talonavicular joint rectus, rigid. Moderate residual pes cavus  Ankle range of motion full without crepitus              Procedures         ASSESSMENT AND PLAN    Diagnoses and all orders for this visit:    1. Leg swelling (Primary)  -     CBC & Differential; Future  -     C-reactive Protein; Future  -     Sedimentation Rate; Future  -     D-dimer, Quantitative; Future  -     US Venous Doppler Lower Extremity Right (duplex); Future    2. Left foot pain  -     XR Foot 3+ View Left  -     XR Ankle 3+ View Left    3. Acute left ankle pain  -     XR Ankle 3+ View Left  -     CBC & Differential; Future  -     C-reactive Protein; Future  -     Sedimentation Rate; Future  -     D-dimer, Quantitative; Future    4. Pain of left calf  -     Wound Culture - Wound, Foot; Future  -     US Venous Doppler Lower Extremity Right (duplex); Future  -     Wound Culture - Wound, Foot    5. Ankle arthritis    6. Diabetic polyneuropathy associated with type 2 diabetes mellitus (CMS/HCC)    Other orders  -     doxycycline (VIBRAMYCIN) 100 MG capsule; Take 1 capsule by mouth 2 (Two) Times a Day.  Dispense: 20 capsule; Refill: 0        -Radiographs ordered reviewed.  No acute fractures noted, however there is some concern for possible transition into acute Charcot as talar dome does have some increased radiolucency.  -Sutures removed.  Medial anterior ankle incisions are well-healed.  Mild residual breakdown of the posterior heel incision without any active drainage or fluctuance  -We will continue nonweightbearing in her cam boot.  -Due to increased erythema swelling and pain patient was sent to the outpatient laboratory for CBC/ESR/CRP as well as D-dimer.  A wound culture was taken from the posterior heel incision as well.  The initial laboratory studies to return was her D-dimer which was equal to 4000.  Patient was then sent to the emergency department for stat  venous ultrasound.  -Patient will be started on doxycycline empirically while awaiting remaining results.  -Pending outcome of work-up in the emergency department we will plan to recheck next week.          This document has been electronically signed by Ignacio Lord DPM on March 9, 2021 18:27 CST

## 2021-03-09 NOTE — PROGRESS NOTES
"Chief Complaint  Follow-up    Subjective          Ariana Martinez presents to [facility] ENDOCRINOLOGY  History of Present Illness       in office visit       Primary care provider Kimberly Ferguson, BEBE       61 year old female presents for follow up           reason diabetes mellitus type 2         Duration diagnosed at the age of 26     Onset of symptoms gradual     Timing constant     Quality uncontrolled     Severity moderate     She believes states she broke her foot again last night, it is the left foot that recently had surgery    She has an appointment tomorrow with podiatry we did call and they were going to see her today     Severity (Complications/Hospitalizations)  Secondary Macrovascular Complications: CAD, No CVA, No PAD     2 stents placed in feb. 2020         Secondary Microvascular Complications: No Diabetic Nephropathy, No Diabetic Retinopathy, Diabetic Neuropathy     Context  Diabetes Regimen: Insulin, Oral Medications        Lab Results   Component Value Date    HGBA1C 8.63 (H) 03/05/2021             Blood Glucose Readings     Off  dexcom sensor --- due to finances      100 up to 200            diet high carb      Exercise none           Hyperglycemic Symptoms: none  Hypoglycemic Episodes: Documented symptomatic hypoglycemia, Seizures and syncope related to hypoglycemia--none recent          Symptoms - foot pain     Objective   Vital Signs:   /70   Pulse 88   Ht 172.7 cm (68\")   SpO2 97%   BMI 39.96 kg/m²     Physical Exam  Constitutional:       Appearance: Normal appearance.   HENT:      Head: Normocephalic.      Right Ear: External ear normal.      Left Ear: External ear normal.   Eyes:      Conjunctiva/sclera: Conjunctivae normal.      Pupils: Pupils are equal, round, and reactive to light.   Cardiovascular:      Rate and Rhythm: Regular rhythm.      Heart sounds: Normal heart sounds.   Pulmonary:      Breath sounds: Normal breath sounds.   Musculoskeletal:         " General: Normal range of motion.      Cervical back: Normal range of motion.      Comments: Left foot    Skin:     Coloration: Skin is not pale.   Neurological:      General: No focal deficit present.      Mental Status: She is alert.   Psychiatric:         Mood and Affect: Mood normal.         Thought Content: Thought content normal.         Judgment: Judgment normal.        Result Review :   The following data was reviewed by: BEBE Prince on 03/09/2021:  Common labs    Common Labsle 12/15/20 2/24/21 3/5/21 3/5/21 3/5/21 3/5/21 3/5/21      0808 0808 0808 0808 0841   Glucose  145 (A)  153 (A)      BUN  14  11      Creatinine  0.86  0.79      eGFR Non African Am  68  74      Sodium  141  136      Potassium  3.4 (A)  3.5      Chloride  103  100      Calcium  9.7  9.2      Albumin    3.80      Total Bilirubin    0.2      Alkaline Phosphatase    132 (A)      AST (SGOT)    17      ALT (SGPT)    16      WBC 10.37  8.32       Hemoglobin 13.8  12.9       Hematocrit 40.2  37.7       Platelets 336  475 (A)       Total Cholesterol     149     Triglycerides     186 (A)     HDL Cholesterol     38 (A)     LDL Cholesterol      79     Hemoglobin A1C      8.63 (A)    Microalbumin, Urine       <1.2   (A) Abnormal value       Comments are available for some flowsheets but are not being displayed.                     Assessment and Plan    Diagnoses and all orders for this visit:    1. 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 Vitamin D deficiency  -     CBC & Differential;  Future  -     Comprehensive Metabolic Panel; Future  -     Hemoglobin A1c; Future  -     Lipid Panel; Future  -     Microalbumin / Creatinine Urine Ratio - Urine, Clean Catch; Future  -     Protein / Creatinine Ratio, Urine - Urine, Clean Catch; Future  -     TSH; Future  -     Vitamin B12; Future  -     Vitamin D 25 Hydroxy; Future    3. Mixed hyperlipidemia  -     CBC & Differential; Future  -     Comprehensive Metabolic Panel; Future  -     Hemoglobin A1c; Future  -     Lipid Panel; Future  -     Microalbumin / Creatinine Urine Ratio - Urine, Clean Catch; Future  -     Protein / Creatinine Ratio, Urine - Urine, Clean Catch; Future  -     TSH; Future  -     Vitamin B12; Future  -     Vitamin D 25 Hydroxy; Future    4. Essential hypertension  -     CBC & Differential; Future  -     Comprehensive Metabolic Panel; Future  -     Hemoglobin A1c; Future  -     Lipid Panel; Future  -     Microalbumin / Creatinine Urine Ratio - Urine, Clean Catch; Future  -     Protein / Creatinine Ratio, Urine - Urine, Clean Catch; Future  -     TSH; Future  -     Vitamin B12; Future  -     Vitamin D 25 Hydroxy; Future                 Glycemic Management     Diabetes mellitus not controlled         Lab Results   Component Value Date    HGBA1C 8.63 (H) 03/05/2021             Dexcom sensor ---off due to finances          Basaglar taking 60 units --increase to 64 units         ==================        Novolog      Taking 60 units TID            ==================     Jardiance-- stopped         ==================     Metformin--stopped      ====================      bydureon - stopped        Victoza stopped due to side effects            januvia 100 mg daily  -stopped      ------------------------------     Lipid Management        Taking lipitor 80- mg one at night               LDL needs to be 70 or less     add zetia         Total Cholesterol   Date Value Ref Range Status   03/05/2021 149 0 - 200 mg/dL Final     Triglycerides   Date  Value Ref Range Status   03/05/2021 186 (H) 0 - 150 mg/dL Final     HDL Cholesterol   Date Value Ref Range Status   03/05/2021 38 (L) 40 - 60 mg/dL Final     LDL Cholesterol    Date Value Ref Range Status   03/05/2021 79 0 - 100 mg/dL Final                  Blood Pressure Management: Control of blood pressure  on ACEi     stopped the lasix         Microvascular Complication Monitoring:     Neurontin 400 mg po TID -- moderate relief she is taking        Component      Latest Ref Rng & Units 11/12/2020 11/12/2020           8:26 AM  8:26 AM   Protein/Creatinine Ratio      0.0 - 200.0 mg/G Crea 203.8 (H)     Creatinine, Urine      mg/dL 196.3 196.3   Total Protein, Urine      mg/dL 40.0     Microalbumin/Creatinine Ratio      mg/g   39.7   Microalbumin, Urine      mg/dL   7.8         intermittetly takes reglan for gastroparesis     states eye exam was March 2020                  Immunization - last influenza vaccine Oct. 2020        Other Diabetes Related Aspects     TSH abnormal from July to Sept 2014     TSH in the 5 to 7 range        She still does not want treatment and guidelines state we do not have to treat until the TSH is 7 or higher      Lab Results   Component Value Date    TSH 5.410 (H) 03/05/2021       ---     Vitamin D deficiency        Taking vitamin D supplement                 Component      Latest Ref Rng & Units 6/10/2020   25 Hydroxy, Vitamin D      30.0 - 100.0 ng/ml 30.1                         Lab Results   Component Value Date     [id number] 710 06/10/2020             Follow Up   No follow-ups on file.  Patient was given instructions and counseling regarding her condition or for health maintenance advice. Please see specific information pulled into the AVS if appropriate.

## 2021-03-10 ENCOUNTER — APPOINTMENT (OUTPATIENT)
Dept: ULTRASOUND IMAGING | Facility: HOSPITAL | Age: 61
End: 2021-03-10

## 2021-03-10 ENCOUNTER — APPOINTMENT (OUTPATIENT)
Dept: INTERVENTIONAL RADIOLOGY/VASCULAR | Facility: HOSPITAL | Age: 61
End: 2021-03-10

## 2021-03-10 ENCOUNTER — APPOINTMENT (OUTPATIENT)
Dept: CT IMAGING | Facility: HOSPITAL | Age: 61
End: 2021-03-10

## 2021-03-10 PROBLEM — E11.42 DIABETIC PERIPHERAL NEUROPATHY ASSOCIATED WITH TYPE 2 DIABETES MELLITUS: Status: RESOLVED | Noted: 2017-08-27 | Resolved: 2021-03-10

## 2021-03-10 PROBLEM — R65.20 SEPSIS DUE TO METHICILLIN SUSCEPTIBLE STAPHYLOCOCCUS AUREUS (MSSA) WITH ACUTE RENAL FAILURE WITHOUT SEPTIC SHOCK (HCC): Status: ACTIVE | Noted: 2021-03-10

## 2021-03-10 PROBLEM — N17.9: Status: ACTIVE | Noted: 2021-03-10

## 2021-03-10 PROBLEM — A41.01: Status: ACTIVE | Noted: 2021-03-10

## 2021-03-10 PROBLEM — A41.9 SEPSIS: Status: ACTIVE | Noted: 2021-03-10

## 2021-03-10 LAB
BACTERIA BLD CULT: ABNORMAL
BACTERIA UR QL AUTO: ABNORMAL /HPF
BASOPHILS # BLD AUTO: 0.09 10*3/MM3 (ref 0–0.2)
BASOPHILS NFR BLD AUTO: 0.4 % (ref 0–1.5)
BILIRUB UR QL STRIP: NEGATIVE
CLARITY UR: ABNORMAL
COLOR UR: YELLOW
CRP SERPL-MCNC: 20.49 MG/DL (ref 0–0.5)
DEPRECATED RDW RBC AUTO: 42.6 FL (ref 37–54)
EOSINOPHIL # BLD AUTO: 0.11 10*3/MM3 (ref 0–0.4)
EOSINOPHIL NFR BLD AUTO: 0.5 % (ref 0.3–6.2)
ERYTHROCYTE [DISTWIDTH] IN BLOOD BY AUTOMATED COUNT: 12.9 % (ref 12.3–15.4)
ERYTHROCYTE [SEDIMENTATION RATE] IN BLOOD: 52 MM/HR (ref 0–30)
GLUCOSE BLDC GLUCOMTR-MCNC: 195 MG/DL (ref 70–130)
GLUCOSE BLDC GLUCOMTR-MCNC: 230 MG/DL (ref 70–130)
GLUCOSE BLDC GLUCOMTR-MCNC: 281 MG/DL (ref 70–130)
GLUCOSE BLDC GLUCOMTR-MCNC: 301 MG/DL (ref 70–130)
GLUCOSE UR STRIP-MCNC: ABNORMAL MG/DL
GRAN CASTS URNS QL MICRO: ABNORMAL
HCT VFR BLD AUTO: 39.3 % (ref 34–46.6)
HGB BLD-MCNC: 13.2 G/DL (ref 12–15.9)
HGB UR QL STRIP.AUTO: NEGATIVE
HYALINE CASTS UR QL AUTO: ABNORMAL /LPF
IMM GRANULOCYTES # BLD AUTO: 0.17 10*3/MM3 (ref 0–0.05)
IMM GRANULOCYTES NFR BLD AUTO: 0.8 % (ref 0–0.5)
KETONES UR QL STRIP: NEGATIVE
LEUKOCYTE ESTERASE UR QL STRIP.AUTO: NEGATIVE
LYMPHOCYTES # BLD AUTO: 1.11 10*3/MM3 (ref 0.7–3.1)
LYMPHOCYTES NFR BLD AUTO: 5.3 % (ref 19.6–45.3)
MCH RBC QN AUTO: 30.3 PG (ref 26.6–33)
MCHC RBC AUTO-ENTMCNC: 33.6 G/DL (ref 31.5–35.7)
MCV RBC AUTO: 90.3 FL (ref 79–97)
MONOCYTES # BLD AUTO: 2.32 10*3/MM3 (ref 0.1–0.9)
MONOCYTES NFR BLD AUTO: 11.2 % (ref 5–12)
NEUTROPHILS NFR BLD AUTO: 16.96 10*3/MM3 (ref 1.7–7)
NEUTROPHILS NFR BLD AUTO: 81.8 % (ref 42.7–76)
NITRITE UR QL STRIP: NEGATIVE
NRBC BLD AUTO-RTO: 0 /100 WBC (ref 0–0.2)
PH UR STRIP.AUTO: 5.5 [PH] (ref 5–9)
PLATELET # BLD AUTO: 489 10*3/MM3 (ref 140–450)
PMV BLD AUTO: 9.8 FL (ref 6–12)
PROCALCITONIN SERPL-MCNC: 0.53 NG/ML (ref 0–0.25)
PROT UR QL STRIP: ABNORMAL
RBC # BLD AUTO: 4.35 10*6/MM3 (ref 3.77–5.28)
RBC # UR: ABNORMAL /HPF
REF LAB TEST METHOD: ABNORMAL
SP GR UR STRIP: 1.03 (ref 1–1.03)
SQUAMOUS #/AREA URNS HPF: ABNORMAL /HPF
UROBILINOGEN UR QL STRIP: ABNORMAL
WBC # BLD AUTO: 20.76 10*3/MM3 (ref 3.4–10.8)
WBC UR QL AUTO: ABNORMAL /HPF

## 2021-03-10 PROCEDURE — 25010000002 VANCOMYCIN 5 G RECONSTITUTED SOLUTION: Performed by: STUDENT IN AN ORGANIZED HEALTH CARE EDUCATION/TRAINING PROGRAM

## 2021-03-10 PROCEDURE — 99225 PR SBSQ OBSERVATION CARE/DAY 25 MINUTES: CPT | Performed by: STUDENT IN AN ORGANIZED HEALTH CARE EDUCATION/TRAINING PROGRAM

## 2021-03-10 PROCEDURE — 25010000002 ENOXAPARIN PER 10 MG: Performed by: STUDENT IN AN ORGANIZED HEALTH CARE EDUCATION/TRAINING PROGRAM

## 2021-03-10 PROCEDURE — 36410 VNPNXR 3YR/> PHY/QHP DX/THER: CPT

## 2021-03-10 PROCEDURE — 63710000001 INSULIN ASPART PER 5 UNITS: Performed by: STUDENT IN AN ORGANIZED HEALTH CARE EDUCATION/TRAINING PROGRAM

## 2021-03-10 PROCEDURE — 63710000001 INSULIN DETEMIR PER 5 UNITS: Performed by: STUDENT IN AN ORGANIZED HEALTH CARE EDUCATION/TRAINING PROGRAM

## 2021-03-10 PROCEDURE — 76937 US GUIDE VASCULAR ACCESS: CPT

## 2021-03-10 PROCEDURE — 81001 URINALYSIS AUTO W/SCOPE: CPT | Performed by: STUDENT IN AN ORGANIZED HEALTH CARE EDUCATION/TRAINING PROGRAM

## 2021-03-10 PROCEDURE — 25010000002 HEPARIN (PORCINE) PER 1000 UNITS: Performed by: STUDENT IN AN ORGANIZED HEALTH CARE EDUCATION/TRAINING PROGRAM

## 2021-03-10 PROCEDURE — 63710000001 INSULIN ASPART PER 5 UNITS: Performed by: PODIATRIST

## 2021-03-10 PROCEDURE — 25010000002 PIPERACILLIN SOD-TAZOBACTAM PER 1 G: Performed by: STUDENT IN AN ORGANIZED HEALTH CARE EDUCATION/TRAINING PROGRAM

## 2021-03-10 PROCEDURE — 73700 CT LOWER EXTREMITY W/O DYE: CPT

## 2021-03-10 PROCEDURE — 05HD33Z INSERTION OF INFUSION DEVICE INTO RIGHT CEPHALIC VEIN, PERCUTANEOUS APPROACH: ICD-10-PCS | Performed by: RADIOLOGY

## 2021-03-10 PROCEDURE — 82962 GLUCOSE BLOOD TEST: CPT

## 2021-03-10 PROCEDURE — 99253 IP/OBS CNSLTJ NEW/EST LOW 45: CPT | Performed by: PODIATRIST

## 2021-03-10 PROCEDURE — C1751 CATH, INF, PER/CENT/MIDLINE: HCPCS

## 2021-03-10 RX ORDER — NICOTINE POLACRILEX 4 MG
15 LOZENGE BUCCAL
Status: DISCONTINUED | OUTPATIENT
Start: 2021-03-10 | End: 2021-03-10 | Stop reason: SDUPTHER

## 2021-03-10 RX ORDER — SODIUM CHLORIDE 9 MG/ML
100 INJECTION, SOLUTION INTRAVENOUS CONTINUOUS
Status: DISCONTINUED | OUTPATIENT
Start: 2021-03-10 | End: 2021-03-14 | Stop reason: HOSPADM

## 2021-03-10 RX ORDER — HYDROCODONE BITARTRATE AND ACETAMINOPHEN 7.5; 325 MG/1; MG/1
1 TABLET ORAL EVERY 6 HOURS PRN
Status: DISCONTINUED | OUTPATIENT
Start: 2021-03-10 | End: 2021-03-12

## 2021-03-10 RX ORDER — DEXTROSE MONOHYDRATE 50 MG/ML
100 INJECTION, SOLUTION INTRAVENOUS CONTINUOUS
Status: DISCONTINUED | OUTPATIENT
Start: 2021-03-10 | End: 2021-03-10

## 2021-03-10 RX ORDER — DEXTROSE AND SODIUM CHLORIDE 5; .45 G/100ML; G/100ML
100 INJECTION, SOLUTION INTRAVENOUS CONTINUOUS
Status: DISCONTINUED | OUTPATIENT
Start: 2021-03-10 | End: 2021-03-10

## 2021-03-10 RX ORDER — DEXTROSE MONOHYDRATE 25 G/50ML
25 INJECTION, SOLUTION INTRAVENOUS
Status: DISCONTINUED | OUTPATIENT
Start: 2021-03-10 | End: 2021-03-14 | Stop reason: HOSPADM

## 2021-03-10 RX ADMIN — PANTOPRAZOLE SODIUM 20 MG: 20 TABLET, DELAYED RELEASE ORAL at 08:07

## 2021-03-10 RX ADMIN — METOPROLOL SUCCINATE 25 MG: 25 TABLET, FILM COATED, EXTENDED RELEASE ORAL at 08:07

## 2021-03-10 RX ADMIN — INSULIN ASPART 2 UNITS: 100 INJECTION, SOLUTION INTRAVENOUS; SUBCUTANEOUS at 17:03

## 2021-03-10 RX ADMIN — MIRTAZAPINE 45 MG: 15 TABLET, FILM COATED ORAL at 20:58

## 2021-03-10 RX ADMIN — INSULIN ASPART 5 UNITS: 100 INJECTION, SOLUTION INTRAVENOUS; SUBCUTANEOUS at 08:06

## 2021-03-10 RX ADMIN — IBUPROFEN 500 MG: 400 TABLET ORAL at 10:45

## 2021-03-10 RX ADMIN — VENLAFAXINE HYDROCHLORIDE 150 MG: 75 CAPSULE, EXTENDED RELEASE ORAL at 08:07

## 2021-03-10 RX ADMIN — PIPERACILLIN SODIUM AND TAZOBACTAM SODIUM 3.38 G: 3; .375 INJECTION, POWDER, LYOPHILIZED, FOR SOLUTION INTRAVENOUS at 17:03

## 2021-03-10 RX ADMIN — FUROSEMIDE 40 MG: 40 TABLET ORAL at 08:07

## 2021-03-10 RX ADMIN — INSULIN DETEMIR 60 UNITS: 100 INJECTION, SOLUTION SUBCUTANEOUS at 21:04

## 2021-03-10 RX ADMIN — ACETAMINOPHEN 650 MG: 325 TABLET, FILM COATED ORAL at 21:11

## 2021-03-10 RX ADMIN — HEPARIN SODIUM 5000 UNITS: 5000 INJECTION, SOLUTION INTRAVENOUS; SUBCUTANEOUS at 08:06

## 2021-03-10 RX ADMIN — SODIUM CHLORIDE, PRESERVATIVE FREE 10 ML: 5 INJECTION INTRAVENOUS at 20:59

## 2021-03-10 RX ADMIN — ATORVASTATIN CALCIUM 80 MG: 40 TABLET, FILM COATED ORAL at 08:07

## 2021-03-10 RX ADMIN — ACETAMINOPHEN 650 MG: 325 TABLET, FILM COATED ORAL at 08:06

## 2021-03-10 RX ADMIN — VANCOMYCIN HYDROCHLORIDE 1500 MG: 5 INJECTION, POWDER, LYOPHILIZED, FOR SOLUTION INTRAVENOUS at 10:54

## 2021-03-10 RX ADMIN — GABAPENTIN 400 MG: 400 CAPSULE ORAL at 08:07

## 2021-03-10 RX ADMIN — PIPERACILLIN SODIUM AND TAZOBACTAM SODIUM 3.38 G: 3; .375 INJECTION, POWDER, LYOPHILIZED, FOR SOLUTION INTRAVENOUS at 23:19

## 2021-03-10 RX ADMIN — ENOXAPARIN SODIUM 40 MG: 40 INJECTION SUBCUTANEOUS at 10:37

## 2021-03-10 RX ADMIN — HYDROCODONE BITARTRATE AND ACETAMINOPHEN 1 TABLET: 7.5; 325 TABLET ORAL at 09:24

## 2021-03-10 RX ADMIN — ASPIRIN 81 MG: 81 TABLET, FILM COATED ORAL at 08:07

## 2021-03-10 RX ADMIN — GABAPENTIN 400 MG: 400 CAPSULE ORAL at 17:03

## 2021-03-10 RX ADMIN — SODIUM CHLORIDE, PRESERVATIVE FREE 10 ML: 5 INJECTION INTRAVENOUS at 08:05

## 2021-03-10 RX ADMIN — GABAPENTIN 400 MG: 400 CAPSULE ORAL at 20:58

## 2021-03-10 RX ADMIN — PIPERACILLIN SODIUM AND TAZOBACTAM SODIUM 3.38 G: 3; .375 INJECTION, POWDER, LYOPHILIZED, FOR SOLUTION INTRAVENOUS at 05:25

## 2021-03-10 RX ADMIN — SODIUM CHLORIDE 100 ML/HR: 9 INJECTION, SOLUTION INTRAVENOUS at 10:37

## 2021-03-10 RX ADMIN — ARIPIPRAZOLE 15 MG: 15 TABLET ORAL at 08:07

## 2021-03-10 RX ADMIN — PIPERACILLIN SODIUM AND TAZOBACTAM SODIUM 3.38 G: 3; .375 INJECTION, POWDER, LYOPHILIZED, FOR SOLUTION INTRAVENOUS at 10:54

## 2021-03-10 RX ADMIN — INSULIN ASPART 4 UNITS: 100 INJECTION, SOLUTION INTRAVENOUS; SUBCUTANEOUS at 10:48

## 2021-03-10 RX ADMIN — CLOPIDOGREL BISULFATE 75 MG: 75 TABLET ORAL at 08:07

## 2021-03-10 NOTE — SIGNIFICANT NOTE
03/10/21 1006   OTHER   Discipline occupational therapist   Rehab Time/Intention   Session Not Performed other (see comments)     OT eric attempted; RN defer this a.m. due to pt having high fever. OT will f/u as appropriate   Health Maintenance Summary     Topic Due On Due Status Completed On Postpone Until Reason    Osteoporosis Screening  Completed Aug 30, 2011      Immunization-Zoster  Completed Feb 12, 2012      Immunization - Pneumococcal  Completed Jan 5, 2017      Medicare Wellness Visit Jan 31, 2018 Due Soon Jan 31, 2017      IMMUNIZATION - DTaP/Tdap/Td Oct 14, 2026 Not Due Oct 14, 2016      Immunization-Influenza Sep 1, 2017 Postponed Oct 14, 2016 Oct 30, 2017 Patient Refused          Patient is due for topics as listed above, she wishes to decline at this time .

## 2021-03-10 NOTE — PROGRESS NOTES
Enter Query Response Below      Query Response: Heart Failure with preserved EF             If applicable, please update the problem list.        Patient: Ariana Martinez        : 1960  Account: [id number]           Admit Date: 21         Options to Respond to Query:    1. Access the Encounter     a. From the To-Do Side bar, click Respond With Note.     b. Click New Note     c. Answer query within the yellow box.                d. Update the Problem List if applicable.     Dr. Troncoso,     62 y/o noted with Sepsis, Cellulitis of left lower extremity, Diabetes, Acute Kidney Injury, Coronary Artery Disease, Hypertension and a history of Congestive Heart Failure. Echo (20) revealed: Left ventricular systolic function is normal at 61-65%, Mild concentric hypertrophy with indeterminate diastolic function, there is mild right ventricular hypertrophy, and mild thickening and calcification of a likely trileaflet aortic valve. Patient treated with continuation of home medication Lasix and Metoprolol.     After study, can the Congestive Heart Failure be specified as:     >>Chronic Diastolic CHF  >>Heart Failure with preserved EF  >>Other (please specify):________  >>Unable to determine      By submitting this query, we are merely seeking further clarification of documentation to accurately reflect all conditions that you are monitoring, evaluating, treating or that extend the hospitalization or utilize additional resources of care. Please utilize your independent clinical judgment when addressing the question(s) above.     This query and your response, once completed, will be entered into the legal medical record.    Sincerely,  Jada Espino  Clinical Documentation Integrity Program   [email or web address]

## 2021-03-10 NOTE — PROGRESS NOTES
TWO PATIENT IDENTIFIERS WERE USED. THE PATIENT WAS DRAPED WITH A FULL BODY DRAPE AND THE PATIENT'S RIGHT ARM WAS PREPPED WITH CHLORA PREP. ULTRASOUND WAS USED TO LOCALIZE THERIGHT BASILIC VEIN. SUBCUTANEOUS TISSUE AT THE CATHETER SITE WAS INFILTRATED WITH 2% LIDOCAINE. UNDER ULTRASOUND GUIDANCE, THE VEIN WAS ACCESSED WITH A 21 GAUGE  NEEDLE. AN 0.018 WIRE WAS THEN THREADED THROUGH THE NEEDLE. THE 21 GAUGE NEEDLE WAS REMOVED AND A 4 Azeri SHEATH WAS PLACED OVER THE WIRE INTO THE VEIN.THE MIDLINE CATHETER WAS TRIMMED TO 20CM. THE MIDLINE CATHETER WAS THEN PLACED OVER THE WIRE INTO THE VEIN, THE SHEATH WAS PEELED AWAY, WIRE WAS REMOVED. CATHETER WAS FLUSHED WITH NORMAL SALINE AND CATHETER TIP APPLIED. BIOPATCH PLACED. CATHETER SECURED WITH STAT LOCK AND TEGADERM. PATIENT TOLERATED PROCEDURE WELL. THIS WAS DONE IN THE ANGIOSUITE      IMPRESSION:SUCCESSFUL PLACEMENT OF DUAL LUMEN MIDLINE.           Connie Wren  3/10/2021  15:37 CST

## 2021-03-10 NOTE — PROGRESS NOTES
"  Pharmacokinetics by Pharmacy - Vancomycin    Ariana Martinez is a 61 y.o. female   [Ht: 172.7 cm (68\"); Wt: 126 kg (276 lb 14.4 oz)]    Estimated Creatinine Clearance: 77.8 mL/min (A) (by C-G formula based on SCr of 1.09 mg/dL (H)).   Creatinine   Date Value Ref Range Status   03/09/2021 1.09 (H) 0.57 - 1.00 mg/dL Final   03/05/2021 0.79 0.57 - 1.00 mg/dL Final   02/24/2021 0.86 0.57 - 1.00 mg/dL Final      Lab Results   Component Value Date    WBC 22.39 (H) 03/09/2021    WBC 20.76 (H) 03/09/2021    WBC 8.32 03/05/2021      Temp Readings from Last 1 Encounters:   03/10/21 (!) 102.6 °F (39.2 °C) (Oral)      C-Reactive Protein   Date Value Ref Range Status   03/09/2021 20.49 (H) 0.00 - 0.50 mg/dL Final   09/30/2019 0.48 0.00 - 0.50 mg/dL Final   02/24/2018 0.60 0.00 - 1.00 mg/dL Final     Lactate   Date Value Ref Range Status   03/09/2021 2.1 (C) 0.5 - 2.0 mmol/L Final   11/28/2017 1.3 0.5 - 2.0 mmol/L Final   10/14/2017 1.9 0.5 - 2.0 mmol/L Final       Indication for use: sepsis secondary to lower extremity cellulitis     Baseline culture results:  Microbiology Results (last 10 days)       Procedure Component Value - Date/Time    COVID-19 and FLU A/B PCR - Swab, Nasopharynx [[id number]]  (Normal) Collected: 03/09/21 1922    Lab Status: Final result Specimen: Swab from Nasopharynx Updated: 03/09/21 2003     COVID19 Not Detected     Influenza A PCR Not Detected     Influenza B PCR Not Detected    Narrative:      Fact sheet for providers: https://www.fda.gov/media/525464/download    Fact sheet for patients: https://www.fda.gov/media/437142/download    Test performed by PCR.    Wound Culture - Wound, Foot [[id number]]  (Abnormal) Collected: 03/09/21 1650    Lab Status: Preliminary result Specimen: Wound from Foot Updated: 03/10/21 0816     Wound Culture Light growth (2+) Staphylococcus aureus      Scant growth (1+) Gram Negative Bacilli     Gram Stain Rare (1+) WBCs seen      Rare (1+) Gram positive cocci in pairs      "     No results found for: RESPCX    Assessment/Plan  Initiated Vancomycin 2500 mg x 1 dose last night at 2316 then dosed at 1500 mg IVPB Q12H. Will order Vancomycin peak level 3/11 at 0130 and  trough level 3/11 at 1030.  Calculated AUC is 492.51 (goal 400-600), calculated trough level is 13.9  (goal 10-20) and calculated peak level is 28.69 (goal 30-40).  Pt is also on Zosyn. WBC= 22.39. Cr is 1.09. Pt is febrile. Pharmacy will monitor renal function and adjust dose accordingly.    Anita Underwood, PharmD  03/10/21 08:45 CST

## 2021-03-10 NOTE — H&P
"    HISTORY AND PHYSICAL  NAME: Ariana Martinez  : 1960  MRN: [id number]    DATE OF ADMISSION:  3/9/2021     DATE & TIME SEEN: 21 at 2131    PCP: Kimberly Ferguson APRN    CODE STATUS: Full code    CHIEF COMPLAINT: Leg pain and swelling    HPI:  Ariana Martinez is a 61 y.o. female with a CMH of coronary artery disease, depression, hypertension, type 2 diabetes mellitus and a recent left ankle arthroscopy, exostectomy, hardware removal of the left foot on 2021 with Dr. Lord (Podiatry) who presents to the ED with complaints of left lower extremity pain and swelling. Patient states that last night she was hobbling on one leg from her bedside commode to her bed when she heard a \"pop\" on her left foot. Patient had a clinic appointment with Dr. Lord today for follow-up of recent surgery where he completed some blood work. Given that patient developed worsening of lower extremity pain and swelling, Dr. Lord recommended that patient go to the ED to rule out DVT.     In the ED, patient was given Norco for pain, and started on Vanco and Zosyn.  She was also given 1 L bolus.  CTA chest did not show any evidence of PE.  Duplex venous ultrasound did not show any evidence of DVT.  Patient had a lactate of 2.1 and WBC of 22.      CONCURRENT MEDICAL HISTORY:  Past Medical History:   Diagnosis Date   • Angina, class IV (CMS/HCC)    • Anxiety    • Anxiety and depression    • Arthritis    • Benign paroxysmal positional vertigo    • Bladder disorder     has bladder stimulator   • Carpal tunnel syndrome    • CHF (congestive heart failure) (CMS/HCC)    • Chronic pain    • Coronary atherosclerosis     hx CABG .  is followed by Dr Kwon   • Depression    • Diabetes mellitus (CMS/HCC)     Type 2, controlled   • Diabetic polyneuropathy (CMS/HCC)    • Disease of thyroid gland    • Elevated cholesterol    • Female stress incontinence    • Foot pain, left    • Full dentures    • Gastroparesis    • GERD " (gastroesophageal reflux disease)    • Hyperlipidemia    • Hypertension    • Low back pain    • Malaise and fatigue    • Multiple joint pain    • Obesity     Refuses to be weighed   • Otalgia     Both   • Perforation of tympanic membrane     Left   • Postoperative wound infection    • Vitamin D deficiency    • Wears glasses     reading       PAST SURGICAL HISTORY:  Past Surgical History:   Procedure Laterality Date   • ABDOMINAL SURGERY     • ANGIOPLASTY      coronary   • ANKLE ARTHROSCOPY Left 2/26/2021    Procedure: Left foot hardware removal, ankle arthroscopy, bone grafting , left foot exostectomy;  Surgeon: Ignacio Lord DPM;  Location: [facility] OR;  Service: Podiatry;  Laterality: Left;   • BREAST BIOPSY Right    • CARDIAC CATHETERIZATION     • CARDIAC CATHETERIZATION N/A 6/20/2017    Procedure: Right Heart Cath;  Surgeon: Can Kwon MD PhD;  Location: [facility] CATH INVASIVE LOCATION;  Service:    • CARDIAC CATHETERIZATION N/A 2/18/2020    Procedure: Left Heart Cath;  Surgeon: Catalina Cooper MD;  Location: [facility] CATH INVASIVE LOCATION;  Service: Cardiology;  Laterality: N/A;   • CARPAL TUNNEL RELEASE     • CHOLECYSTECTOMY     • COLONOSCOPY N/A 6/24/2020    Procedure: COLONOSCOPY;  Surgeon: Julián Maldonado MD;  Location: [facility] ENDOSCOPY;  Service: Gastroenterology;  Laterality: N/A;   • CORONARY ARTERY BYPASS GRAFT  2005   • ENDOSCOPY N/A 10/19/2018    Procedure: ESOPHAGOGASTRODUODENOSCOPY possible dilation;  Surgeon: Julián Maldonado MD;  Location: [facility] ENDOSCOPY;  Service: Gastroenterology   • ENDOSCOPY N/A 6/24/2020    Procedure: ESOPHAGOGASTRODUODENOSCOPY WED appt please;  Surgeon: Julián Maldonado MD;  Location: [facility] ENDOSCOPY;  Service: Gastroenterology;  Laterality: N/A;   • ENDOSCOPY AND COLONOSCOPY     • FOOT SURGERY      Toes   • FOOT SURGERY     • GASTRIC BANDING      Revision, laparoscopic   • HYSTERECTOMY     • MOUTH SURGERY     • SALPINGO OOPHORECTOMY     • SHOULDER  SURGERY     • SUBTALAR ARTHRODESIS Left 1/16/2019    Procedure: LEFT FOOT HARDWARE REMOVAL, FIFTH METATARSAL , OPEN REDUCTION INTERNAL FIXATION, CALCANEAL OSTEOTOMY;  Surgeon: Ignacio Lord DPM;  Location: [facility];  Service: Podiatry   • SUBTALAR ARTHRODESIS Left 10/16/2019    Procedure: foot hardware removal, subtalar joint fusion  possible de/reattachment of achilles tendon        (c-arm);  Surgeon: Ignacio Lord DPM;  Location: [facility];  Service: Podiatry   • SUBTALAR ARTHRODESIS Left 9/30/2020    Procedure: subtalar, talonavicular joint arthrodesis.  Removal hardware.          (c-arm);  Surgeon: Ignacio Lord DPM;  Location: [facility];  Service: Podiatry;  Laterality: Left;   • TRANSESOPHAGEAL ECHOCARDIOGRAM (LAMONTE)      With color flow       FAMILY HISTORY:  Family History   Problem Relation Age of Onset   • Diabetes Other    • Heart disease Other    • Hypertension Other    • Heart disease Mother    • Stroke Mother    • Hypertension Mother    • Diabetes Sister    • Heart disease Sister    • Hypertension Sister    • Heart disease Sister    • Diabetes Sister    • Hypertension Sister    • Diabetes Sister    • Diabetes Sister    • Diabetes Sister    • Diabetes Sister         SOCIAL HISTORY:  Social History     Socioeconomic History   • Marital status:      Spouse name: Not on file   • Number of children: Not on file   • Years of education: Not on file   • Highest education level: Not on file   Tobacco Use   • Smoking status: Never Smoker   • Smokeless tobacco: Never Used   Vaping Use   • Vaping Use: Never used   Substance and Sexual Activity   • Alcohol use: No   • Drug use: No   • Sexual activity: Defer       HOME MEDICATIONS:  Prior to Admission medications    Medication Sig Start Date End Date Taking? Authorizing Provider   Accu-Chek Iris Plus test strip USE TO CHECK BLOOD SUGAR 4 TIMES DAILY. ACCUCHECK, E11.9 1/13/21   Elvis Gamboa APRN   ARIPiprazole (ABILIFY) 15 MG  tablet Take 1 tablet by mouth Daily. 3/14/19   Francisca Fong MD   aspirin 81 MG chewable tablet Chew 81 mg daily.    Provider, MD Esther   atorvastatin (LIPITOR) 80 MG tablet TAKE 1 TABLET BY MOUTH EVERY DAY 12/22/20   Marty, BEBE Luu   BD SHARPS CONTAINER HOME misc 1 each Take As Directed. 9/7/18   Francisca Fong MD   Blood Glucose Monitoring Suppl (ONE TOUCH ULTRA MINI) w/Device kit USE AS DIRECTED TO CHECK BLOOD SUGAR 7/11/18   Francisca Fong MD   Calcium Citrate-Vitamin D (CITRACAL/VITAMIN D) 250-200 MG-UNIT tablet Take 2 tablets by mouth 2 (two) times a day.    Provider, MD Etsher   clopidogrel (PLAVIX) 75 MG tablet Take 1 tablet by mouth Daily. 12/23/20   Geri Baig MD   cyanocobalamin 1000 MCG/ML injection INJECT 1 ML INTO THE APPROPRIATE MUSCLE AS DIRECTED BY PRESCRIBER EVERY 28 (TWENTY-EIGHT) DAYS. 12/23/20   Geri Baig MD   doxycycline (VIBRAMYCIN) 100 MG capsule Take 1 capsule by mouth 2 (Two) Times a Day. 3/9/21   Ignacio Lord DPM   folic acid (FOLVITE) 1 MG tablet Take 1 tablet by mouth Daily. 12/22/20   Teressa Hammond APRN   furosemide (LASIX) 40 MG tablet TAKE 1 TABLET BY MOUTH EVERY DAY  Patient taking differently: Take 40 mg by mouth Daily. 8/31/20   Marty, BEBE Luu   gabapentin (NEURONTIN) 400 MG capsule Take 1 capsule by mouth 3 (Three) Times a Day. 1/31/21   Marty, BEBE Luu   GLUCAGON EMERGENCY 1 MG injection USE AS DIRECTED AS NEEDED 7/28/17   Elvis Gamboa APRN   glucose monitor monitoring kit 1 each Daily. accucheck eve meter, E11.9 6/11/20   Elvis Gamboa APRN   hydroCHLOROthiazide (HYDRODIURIL) 12.5 MG tablet Take 12.5 mg by mouth Daily.    Provider, MD Esther   Insulin Glargine (BASAGLAR KWIKPEN) 100 UNIT/ML injection pen Inject 100 units under skin in the the appropriate area as directed every night.   Patient taking differently: 60 Units Every Night. 6/9/20   Elvis Gamboa,  "APRN   Insulin Pen Needle (B-D ULTRAFINE III SHORT PEN) 31G X 8 MM misc USE TO INJECT 4 TIMES A DAY 12/27/20   Elvis Gamboa APRN   LORazepam (ATIVAN) 0.5 MG tablet TAKE 1 TABLET BY MOUTH EVERY DAY AS NEEDED FOR ANXIETY 3/8/21   Kimberly Ferguson APRN   meclizine (ANTIVERT) 25 MG tablet Take 1 tablet by mouth 3 (Three) Times a Day As Needed for dizziness. 12/14/17   Evon Hernandez APRN   metoclopramide (REGLAN) 10 MG tablet TAKE 1 TABLET BY MOUTH FOUR TIMES A DAY AS NEEDED 1/27/21   Marcela Lawson APRN   metoprolol succinate XL (TOPROL-XL) 25 MG 24 hr tablet TAKE 1 TABLET BY MOUTH EVERY DAY  Patient taking differently: Take 25 mg by mouth Daily. 6/1/20   Bill Gibson MD   mirtazapine (REMERON) 45 MG tablet TAKE 1 TABLET BY MOUTH EVERY NIGHT. 7/17/19   Francisca Fong MD   NOVOLOG FLEXPEN 100 UNIT/ML solution pen-injector sc pen INJECT 60 UNITS BEFORE MEALS 3 TIMES A DAY  Patient taking differently: Inject 60 Units under the skin into the appropriate area as directed 3 (Three) Times a Day With Meals. 4/6/20   Baldemar Melendez MD   ondansetron (ZOFRAN) 8 MG tablet TAKE 1 TABLET BY MOUTH EVERY 8 (EIGHT) HOURS AS NEEDED FOR NAUSEA OR VOMITING. 1/4/21   Ferguson, BEBE Luu   pantoprazole (PROTONIX) 20 MG EC tablet TAKE 1 TABLET BY MOUTH EVERY DAY  Patient taking differently: Take 20 mg by mouth Daily. 12/23/20   Geri Baig MD   Syringe/Needle, Disp, (Luer Lock Safety Syringes) 25G X 5/8\" 3 ML misc 1 each Daily. 1 Q28 DAYS 4/16/20   Ferguson, BEBE Luu   traMADol (ULTRAM) 50 MG tablet Take 1 tablet by mouth Every 6 (Six) Hours As Needed for Moderate Pain  (pain). 3/2/21   Ignacio Lord DPM   venlafaxine XR (EFFEXOR-XR) 150 MG 24 hr capsule TAKE 1 CAPSULE BY MOUTH TWICE A DAY 3/8/21   Ferguson, BEBE Luu   vitamin D (ERGOCALCIFEROL) 1.25 MG (96031 UT) capsule capsule TAKE ONE CAPSULE BY MOUTH EVERY SUNDAY.  Patient taking differently: Take 50,000 Units " by mouth Every 7 (Seven) Days. 8/24/20   Ferguson, BEBE Luu       ALLERGIES:  Seroquel [quetiapine], Avandia [rosiglitazone], Morphine and related, and Oxycodone    REVIEW OF SYSTEMS  Review of Systems   Constitutional: Negative for chills and fever.   HENT: Negative for facial swelling and trouble swallowing.    Eyes: Negative for photophobia and visual disturbance.   Respiratory: Positive for shortness of breath. Negative for apnea, cough and chest tightness.    Cardiovascular: Positive for leg swelling. Negative for chest pain.        Left leg swelling   Gastrointestinal: Negative for abdominal distention, abdominal pain, constipation, diarrhea, nausea and vomiting.   Genitourinary: Negative for pelvic pain.   Musculoskeletal: Positive for gait problem. Negative for arthralgias and myalgias.   Neurological: Negative for dizziness, seizures, speech difficulty and weakness.   Psychiatric/Behavioral: Negative for confusion and hallucinations.       PHYSICAL EXAM:  Temp:  [98.1 °F (36.7 °C)-98.8 °F (37.1 °C)] 98.2 °F (36.8 °C)  Heart Rate:  [] 103  Resp:  [18] 18  BP: (120-145)/(54-70) 145/66  Body mass index is 42.1 kg/m².  Physical Exam  Constitutional:       Appearance: She is well-developed.   HENT:      Head: Normocephalic and atraumatic.      Nose: Nose normal.   Eyes:      Conjunctiva/sclera: Conjunctivae normal.      Pupils: Pupils are equal, round, and reactive to light.   Cardiovascular:      Rate and Rhythm: Normal rate and regular rhythm.      Pulses: Normal pulses.      Heart sounds: Normal heart sounds.   Pulmonary:      Effort: Pulmonary effort is normal. No respiratory distress.      Breath sounds: Normal breath sounds.   Abdominal:      General: Abdomen is flat. Bowel sounds are normal. There is no distension.      Palpations: Abdomen is soft.   Musculoskeletal:         General: Normal range of motion.      Cervical back: Normal range of motion and neck supple.      Left lower leg: Edema  "present.   Skin:     General: Skin is warm and dry.   Neurological:      General: No focal deficit present.      Mental Status: She is alert and oriented to person, place, and time. Mental status is at baseline.      Cranial Nerves: No cranial nerve deficit.   Psychiatric:         Mood and Affect: Mood normal.         Behavior: Behavior normal.         DIAGNOSTIC DATA:   Lab Results (last 24 hours)     Procedure Component Value Units Date/Time    Procalcitonin [[id number]]  (Abnormal) Collected: 03/09/21 1839    Specimen: Blood Updated: 03/10/21 0022     Procalcitonin 0.53 ng/mL     Narrative:      As a Marker for Sepsis (Non-Neonates):   1. <0.5 ng/mL represents a low risk of severe sepsis and/or septic shock.  1. >2 ng/mL represents a high risk of severe sepsis and/or septic shock.    As a Marker for Lower Respiratory Tract Infections that require antibiotic therapy:  PCT on Admission     Antibiotic Therapy             6-12 Hrs later  > 0.5                Strongly Recommended            >0.25 - <0.5         Recommended  0.1 - 0.25           Discouraged                   Remeasure/reassess PCT  <0.1                 Strongly Discouraged          Remeasure/reassess PCT      As 28 day mortality risk marker: \"Change in Procalcitonin Result\" (> 80 % or <=80 %) if Day 0 (or Day 1) and Day 4 values are available. Refer to http://www.[employer]-pct-calculator.com/   Change in PCT <=80 %   A decrease of PCT levels below or equal to 80 % defines a positive change in PCT test result representing a higher risk for 28-day all-cause mortality of patients diagnosed with severe sepsis or septic shock.  Change in PCT > 80 %   A decrease of PCT levels of more than 80 % defines a negative change in PCT result representing a lower risk for 28-day all-cause mortality of patients diagnosed with severe sepsis or septic shock.                Results may be falsely decreased if patient taking Biotin.     POC Glucose Once [[id number]]  (Abnormal) " Collected: 03/09/21 2225    Specimen: Blood Updated: 03/09/21 2237     Glucose 295 mg/dL      Comment: RN NotifiedOperator: [id number] PEGGY Garcia ID: [id number]       Extra Tubes [[id number]] Collected: 03/09/21 1938    Specimen: Blood, Venous Line Updated: 03/09/21 2045    Narrative:      The following orders were created for panel order Extra Tubes.  Procedure                               Abnormality         Status                     ---------                               -----------         ------                     Gold Top - SST[[id number]]                                   Final result                 Please view results for these tests on the individual orders.    Gold Top - SST [[id number]] Collected: 03/09/21 1938    Specimen: Blood Updated: 03/09/21 2045     Extra Tube Hold for add-ons.     Comment: Auto resulted.       Extra Tubes [[id number]] Collected: 03/09/21 1912    Specimen: Blood, Venous Line Updated: 03/09/21 2015    Narrative:      The following orders were created for panel order Extra Tubes.  Procedure                               Abnormality         Status                     ---------                               -----------         ------                     Light Blue Top[[id number]]                                   Final result                 Please view results for these tests on the individual orders.    Light Blue Top [[id number]] Collected: 03/09/21 1912    Specimen: Blood Updated: 03/09/21 2015     Extra Tube hold for add-on     Comment: Auto resulted       COVID-19 and FLU A/B PCR - Swab, Nasopharynx [[id number]]  (Normal) Collected: 03/09/21 1922    Specimen: Swab from Nasopharynx Updated: 03/09/21 2003     COVID19 Not Detected     Influenza A PCR Not Detected     Influenza B PCR Not Detected    Narrative:      Fact sheet for providers: https://www.fda.gov/media/802994/download    Fact sheet for patients: https://www.fda.gov/media/222552/download    Test performed by PCR.     Lactic Acid, Plasma [[id number]]  (Abnormal) Collected: 03/09/21 1931    Specimen: Blood Updated: 03/09/21 2000     Lactate 2.1 mmol/L     Blood Culture - Blood, Blood, Venous Line [[id number]] Collected: 03/09/21 1931    Specimen: Blood, Venous Line Updated: 03/09/21 1942    Blood Culture - Blood, Blood, Venous Line [[id number]] Collected: 03/09/21 1931    Specimen: Blood, Venous Line Updated: 03/09/21 1939    Comprehensive Metabolic Panel [[id number]]  (Abnormal) Collected: 03/09/21 1839    Specimen: Blood Updated: 03/09/21 1909     Glucose 290 mg/dL      BUN 12 mg/dL      Creatinine 1.09 mg/dL      Sodium 133 mmol/L      Potassium 4.9 mmol/L      Comment: Slight hemolysis detected by analyzer. Results may be affected.        Chloride 95 mmol/L      CO2 25.0 mmol/L      Calcium 8.7 mg/dL      Total Protein 6.9 g/dL      Albumin 3.20 g/dL      ALT (SGPT) 28 U/L      AST (SGOT) 27 U/L      Alkaline Phosphatase 138 U/L      Total Bilirubin 1.2 mg/dL      eGFR Non African Amer 51 mL/min/1.73      Globulin 3.7 gm/dL      A/G Ratio 0.9 g/dL      BUN/Creatinine Ratio 11.0     Anion Gap 13.0 mmol/L     Narrative:      GFR Normal >60  Chronic Kidney Disease <60  Kidney Failure <15      Troponin [[id number]]  (Normal) Collected: 03/09/21 1839    Specimen: Blood Updated: 03/09/21 1904     Troponin T <0.010 ng/mL     Narrative:      Troponin T Reference Range:  <= 0.03 ng/mL-   Negative for AMI  >0.03 ng/mL-     Abnormal for myocardial necrosis.  Clinicians would have to utilize clinical acumen, EKG, Troponin and serial changes to determine if it is an Acute Myocardial Infarction or myocardial injury due to an underlying chronic condition.       Results may be falsely decreased if patient taking Biotin.      BNP [[id number]]  (Normal) Collected: 03/09/21 1839    Specimen: Blood Updated: 03/09/21 1903     proBNP 714.7 pg/mL     Narrative:      Among patients with dyspnea, NT-proBNP is highly sensitive for the detection of acute  congestive heart failure. In addition NT-proBNP of <300 pg/ml effectively rules out acute congestive heart failure with 99% negative predictive value.    Results may be falsely decreased if patient taking Biotin.      CBC & Differential [[id number]]  (Abnormal) Collected: 03/09/21 1839    Specimen: Blood Updated: 03/09/21 1850    Narrative:      The following orders were created for panel order CBC & Differential.  Procedure                               Abnormality         Status                     ---------                               -----------         ------                     CBC Auto Differential[[id number]]        Abnormal            Final result                 Please view results for these tests on the individual orders.    CBC Auto Differential [[id number]]  (Abnormal) Collected: 03/09/21 1839    Specimen: Blood Updated: 03/09/21 1850     WBC 22.39 10*3/mm3      RBC 3.98 10*6/mm3      Hemoglobin 12.1 g/dL      Hematocrit 35.6 %      MCV 89.4 fL      MCH 30.4 pg      MCHC 34.0 g/dL      RDW 13.2 %      RDW-SD 42.8 fl      MPV 9.3 fL      Platelets 432 10*3/mm3      Neutrophil % 81.7 %      Lymphocyte % 5.6 %      Monocyte % 11.1 %      Eosinophil % 0.2 %      Basophil % 0.3 %      Immature Grans % 1.1 %      Neutrophils, Absolute 18.28 10*3/mm3      Lymphocytes, Absolute 1.26 10*3/mm3      Monocytes, Absolute 2.49 10*3/mm3      Eosinophils, Absolute 0.05 10*3/mm3      Basophils, Absolute 0.07 10*3/mm3      Immature Grans, Absolute 0.24 10*3/mm3      nRBC 0.0 /100 WBC            Imaging Results (Last 24 Hours)     Procedure Component Value Units Date/Time    CT Angiogram Chest [[id number]] Collected: 03/09/21 2001     Updated: 03/09/21 2056    Narrative:      PROCEDURE: CT ANGIOGRAM CHEST      .        EXAM:  Computed Tomography with CTA         REGION:  Chest         INDICATION:   Chest pain, tachycardia, cellulitis of lower  extremity, sepsis  - rule out pulmonary embolism         CORRELATIVE IMAGING:   None                          TECHNIQUE:             - PE / vascular protocol               - reconstructions:  axial, coronal, sagittal, obliques     - computer-generated 3D reconstructions (MIPS) were performed.                - contrast:  intravenous 90 mL of Isovue-370                         This exam was performed according to our departmental  dose-optimization program, which includes automated exposure  control, adjustment of the mA and/or kV according to patient size  and/or use of iterative reconstruction technique.  DLP is 713.0              COMMENTS:                               - Pulmonary arterial system:      - Main pulmonary artery trunk:  negative      - Left, right main pulmonary arteries: negative      - Lobar arteries: negative      - Segmental arteries: Limited evaluation of segmental  branches due to contrast phase       - Systemic vascularity (as visualized):        - Aorta: Normal caliber. Atherosclerotic vascular  calcification      - roots of great vessels: Normal caliber.      - SVC / IVC:  grossly negative / normal caliber         - Misc (limited visualization):      - pulmonary parenchyma:  10 year to band like opacities in  the lower lobes bilaterally, likely representing atelectasis or  scarring. Right hemidiaphragm is elevated      - pleura:  negative      - mediastinal / maria guadalupe:  negative      - neck, inferior:  grossly wnl      - subdiaphragmatic structures: The liver appears prominent.  There is pneumobilia, probably secondary to sphincterotomy. The  gallbladder is surgically absent  - osseous:  No acute abnormality identified            .      Impression:      1.  No evidence of pulmonary embolism, though there is limited  evaluation of segmental branches due to contrast phase.          2.  No evidence of acute pathology associated with the visualized  aorta.      3. Linear to bandlike opacities in the upper lung zones probably  represent atelectasis and/or scarring  4. Liver  appears prominent, but is incompletely included in the  study.  5. Pneumobilia, probably secondary to cholecystectomy and  sphincterotomy.    Electronically signed by:  Janki Molina MD  3/9/2021 8:54 PM CST  Workstation: [id number]    US Venous Doppler Lower Extremity Left (duplex) [[id number]] Collected: 03/09/21 1743     Updated: 03/09/21 1752    Narrative:      Doppler duplex venous examination left lower extremity.     CLINICAL INDICATION: Left leg pain.          COMPARISON: None    FINDINGS:    The common femoral,  femoral, and popliteal veins of the left      lower extremity are well identified and compress normally.   Doppler signals are heard either spontaneously or on distal  compression.  No evidence of intraluminal thrombus was noted.     The visualized posterior calf veins are normal.      Impression:      No evidence of deep venous thrombosis in the common  femoral, femoral, or popliteal veins of the left     lower  extremity.    Electronically signed by:  Naveed Taylor MD  3/9/2021 5:49 PM CST  Workstation: [id number]            I reviewed the patient's new clinical results.    ASSESSMENT AND PLAN: This is a 61 y.o. female with:    Active Hospital Problems    Diagnosis  POA   • **Sepsis (CMS/HCC) [A41.9]  Unknown     Priority: High     Results from last 7 days   Lab Units 03/09/21  1931 03/09/21  1839   WBC 10*3/mm3  --  22.39*   LACTATE mmol/L 2.1*  --    -Sepsis secondary to left lower extremity cellulitis  -Given 1 L bolus in the ED  -Reflex lactate pending  -Vanc and Zosyn  -We will obtain urinalysis with cultures  -We will obtain blood culture  -We will obtain pro-Carlito       • Cellulitis of left lower extremity [L03.116]  Yes     Priority: High     -CTA chest shows no evidence of pulmonary embolism per radiologist interpretation  -Duplex venous ultrasound showed no evidence of DVT per radiology interpretation  -Vanc and Zosyn  -Dr. Lord (podiatry) on board will follow patient     • CHON (acute kidney  injury) (CMS/[facility]) [N17.9]  Unknown     Priority: Medium     -Baseline creatinine 1.79  -Creatinine today is 1.09 with a GFR of 51  -IV fluids     • CAD (coronary artery disease) [I25.10]  Yes     -Continue metoprolol and Plavix     • Hypertension [I10]  Yes     -Continue metoprolol, hydrochlorothiazide, Lasix     • Depression, major, recurrent, moderate (CMS/[facility]) [F33.1]  Yes     Continue home Effexor, Remeron, Ativan, Abilify     • 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, with long-term current use of insulin (CMS/[facility]) [E11.49, E11.65, Z79.4]  Not Applicable     -Continue 60 units Levemir nightly  -Sliding scale insulin for additional coverage         DVT prophylaxis: Heparin     Arianajudy Martinez and I have discussed pain goals for this hospitalization after reviewing her current clinical condition, medical history and prior pain experiences.  The goal is to keep the pain level 2-5/10.  To help achieve this, I plan to use prn tylenol.    LESTER reviewed via PDMP and consistent with patient reported medications.    Expected Length of Stay: home in 0-1 days    I discussed the patient's findings and my recommendations with patient.     Tirso Herrera MD is the attending on record at time of admission, He is aware of the patient's status and agrees with the above history and physical.              This document has been electronically signed by Kiara Whitt MD on March 10, 2021 04:35 CST

## 2021-03-10 NOTE — NURSING NOTE
Alert regarding CT of [place] called from Kristen Lord of [facility] called to me to relay to Dr Whitt.  As I was relaying this to him he pulled it up and read it himself.

## 2021-03-10 NOTE — PLAN OF CARE
Goal Outcome Evaluation:  Plan of Care Reviewed With: patient     Outcome Summary: new admit; vss; no s/s of distress at this time; will continue to monitor

## 2021-03-10 NOTE — ACP (ADVANCE CARE PLANNING)
Patient states that she would like to be full code.  In the event that she cannot make any decisions, she would like to designate both her  and sister to make decisions on her behalf. Nadeem Martinez () and Елена Holder (sister). [phone]              This document has been electronically signed by Kiara Whitt MD on March 10, 2021 04:16 CST

## 2021-03-10 NOTE — NURSING NOTE
Spoke with Dr. Whitt concerning pt urinary retention at this time. Explained to MD that pt has the urge to go but is unable to go. MD ordered in and out cath at this time. Will continue to monitor

## 2021-03-10 NOTE — SIGNIFICANT NOTE
03/10/21 1431   OTHER   Discipline occupational therapist;physical therapist   Rehab Time/Intention   Session Not Performed patient/family declined evaluation    OT/PT eval attempted for second time. Pt requesting return tomorrow due to increased pain in bilat feet. Will f/u 3/11/21

## 2021-03-10 NOTE — ED PROVIDER NOTES
Subjective   Patient presents to emergency department for left lower leg pain/swelling.  History of recent surgery by Dr. Lord, podiatrist.  She was sent here for DVT rule out.  Patient also endorses shortness of breath/chest pain mild intermittent over the past 3 days.  She has been off her Plavix for several days due to surgery on 2/26/2021.      History provided by:  Patient   used: No    Leg Swelling  Location:  Left lower leg/ankle  Severity:  Mild  Chronicity:  New  Associated symptoms: myalgias and rash    Associated symptoms: no abdominal pain, no chest pain, no fever, no nausea, no shortness of breath, no sore throat, no vomiting and no wheezing        Review of Systems   Constitutional: Negative for chills and fever.   HENT: Negative for sore throat and trouble swallowing.    Respiratory: Negative for shortness of breath and wheezing.    Cardiovascular: Negative for chest pain.   Gastrointestinal: Negative for abdominal pain, nausea and vomiting.   Genitourinary: Negative for dysuria and flank pain.   Musculoskeletal: Positive for arthralgias and myalgias.   Skin: Positive for color change and rash.   Allergic/Immunologic: Negative for immunocompromised state.   Neurological: Negative for syncope and weakness.   Hematological: Does not bruise/bleed easily.   Psychiatric/Behavioral: Negative for confusion.       Past Medical History:   Diagnosis Date   • Angina, class IV (CMS/HCC)    • Anxiety    • Anxiety and depression    • Arthritis    • Benign paroxysmal positional vertigo    • Bladder disorder     has bladder stimulator   • Carpal tunnel syndrome    • Chronic pain    • Coronary atherosclerosis     hx CABG 2005.  is followed by Dr Kwon   • Depression    • Diabetes mellitus (CMS/HCC)     Type 2, controlled   • Diabetic polyneuropathy (CMS/HCC)    • Disease of thyroid gland    • Elevated cholesterol    • Female stress incontinence    • Foot pain, left    • Full dentures    •  Gastroparesis    • GERD (gastroesophageal reflux disease)    • Hyperlipidemia    • Hypertension    • Low back pain    • Malaise and fatigue    • Multiple joint pain    • Obesity     Refuses to be weighed   • Otalgia     Both   • Perforation of tympanic membrane     Left   • Postoperative wound infection    • Vitamin D deficiency    • Wears glasses     reading       Allergies   Allergen Reactions   • Seroquel [Quetiapine] Anaphylaxis   • Avandia [Rosiglitazone] Swelling   • Morphine And Related Hallucinations   • Oxycodone Hallucinations       Past Surgical History:   Procedure Laterality Date   • ABDOMINAL SURGERY     • ANGIOPLASTY      coronary   • ANKLE ARTHROSCOPY Left 2/26/2021    Procedure: Left foot hardware removal, ankle arthroscopy, bone grafting , left foot exostectomy;  Surgeon: Ignacio Lord DPM;  Location: [facility] OR;  Service: Podiatry;  Laterality: Left;   • BREAST BIOPSY Right    • CARDIAC CATHETERIZATION     • CARDIAC CATHETERIZATION N/A 6/20/2017    Procedure: Right Heart Cath;  Surgeon: Can Kwon MD PhD;  Location: [facility] CATH INVASIVE LOCATION;  Service:    • CARDIAC CATHETERIZATION N/A 2/18/2020    Procedure: Left Heart Cath;  Surgeon: Catalina Cooper MD;  Location: [facility] CATH INVASIVE LOCATION;  Service: Cardiology;  Laterality: N/A;   • CARPAL TUNNEL RELEASE     • CHOLECYSTECTOMY     • COLONOSCOPY N/A 6/24/2020    Procedure: COLONOSCOPY;  Surgeon: Julián Maldonado MD;  Location: [facility] ENDOSCOPY;  Service: Gastroenterology;  Laterality: N/A;   • CORONARY ARTERY BYPASS GRAFT  2005   • ENDOSCOPY N/A 10/19/2018    Procedure: ESOPHAGOGASTRODUODENOSCOPY possible dilation;  Surgeon: Julián Maldonado MD;  Location: [facility] ENDOSCOPY;  Service: Gastroenterology   • ENDOSCOPY N/A 6/24/2020    Procedure: ESOPHAGOGASTRODUODENOSCOPY WED appt please;  Surgeon: Julián Maldonado MD;  Location: [facility] ENDOSCOPY;  Service: Gastroenterology;  Laterality: N/A;   • ENDOSCOPY AND COLONOSCOPY      • FOOT SURGERY      Toes   • FOOT SURGERY     • GASTRIC BANDING      Revision, laparoscopic   • HYSTERECTOMY     • MOUTH SURGERY     • SALPINGO OOPHORECTOMY     • SHOULDER SURGERY     • SUBTALAR ARTHRODESIS Left 1/16/2019    Procedure: LEFT FOOT HARDWARE REMOVAL, FIFTH METATARSAL , OPEN REDUCTION INTERNAL FIXATION, CALCANEAL OSTEOTOMY;  Surgeon: Ignacio Lord DPM;  Location: [facility];  Service: Podiatry   • SUBTALAR ARTHRODESIS Left 10/16/2019    Procedure: foot hardware removal, subtalar joint fusion  possible de/reattachment of achilles tendon        (c-arm);  Surgeon: Ignacio Lord DPM;  Location: [facility] OR;  Service: Podiatry   • SUBTALAR ARTHRODESIS Left 9/30/2020    Procedure: subtalar, talonavicular joint arthrodesis.  Removal hardware.          (c-arm);  Surgeon: Ignacio Lord DPM;  Location: [facility];  Service: Podiatry;  Laterality: Left;   • TRANSESOPHAGEAL ECHOCARDIOGRAM (LAMONTE)      With color flow       Family History   Problem Relation Age of Onset   • Diabetes Other    • Heart disease Other    • Hypertension Other    • Heart disease Mother    • Stroke Mother    • Hypertension Mother    • Diabetes Sister    • Heart disease Sister    • Hypertension Sister    • Heart disease Sister    • Diabetes Sister    • Hypertension Sister    • Diabetes Sister    • Diabetes Sister    • Diabetes Sister    • Diabetes Sister        Social History     Socioeconomic History   • Marital status:      Spouse name: Not on file   • Number of children: Not on file   • Years of education: Not on file   • Highest education level: Not on file   Tobacco Use   • Smoking status: Never Smoker   • Smokeless tobacco: Never Used   Vaping Use   • Vaping Use: Never used   Substance and Sexual Activity   • Alcohol use: No   • Drug use: No   • Sexual activity: Defer           Objective      /60 (BP Location: Left arm, Patient Position: Lying)   Pulse 107   Temp 98.8 °F (37.1 °C) (Temporal)   Resp 18    "Ht 172.7 cm (68\")   Wt 117 kg (258 lb)   SpO2 92%   BMI 39.23 kg/m²     Physical Exam  Vitals and nursing note reviewed.   Constitutional:       Appearance: Normal appearance.   HENT:      Head: Normocephalic and atraumatic.      Mouth/Throat:      Mouth: Mucous membranes are moist.   Eyes:      Conjunctiva/sclera: Conjunctivae normal.   Cardiovascular:      Rate and Rhythm: Normal rate and regular rhythm.      Pulses: Normal pulses.      Heart sounds: Normal heart sounds.   Pulmonary:      Effort: Pulmonary effort is normal. No respiratory distress.      Breath sounds: Normal breath sounds. No wheezing.   Musculoskeletal:         General: Swelling and tenderness present.      Comments: Left lower leg/ankle     Skin:     General: Skin is warm.      Capillary Refill: Capillary refill takes less than 2 seconds.      Findings: Erythema present.   Neurological:      General: No focal deficit present.      Mental Status: She is alert.   Psychiatric:         Mood and Affect: Mood normal.         Behavior: Behavior normal.         Thought Content: Thought content normal.         ECG 12 Lead      Date/Time: 3/9/2021 8:46 PM  Performed by: Ben Porter PA-C  Authorized by: Ben Porter PA-C   Interpreted by physician  Comparison: compared with previous ECG from 2/26/2021  Similar to previous ECG  Rhythm: sinus tachycardia  Rate: tachycardic  BPM: 106  ST Segments: ST segments normal  Clinical impression: non-specific ECG                 ED Course  ED Course as of Mar 09 2049   Tue Mar 09, 2021   1849 Discussed with Dr. Lord, podiatrist.  If patient does not have DVT he thinks she can be discharged home.  He has prescribed her doxycycline for possible postoperative infection or left foot/ankle.      [AME]   2026 Paged FP Resident on call.      [AME]      ED Course User Index  [AME] Ben Porter PA-C      Results for orders placed or performed during the hospital encounter of 03/09/21   COVID-19 " and FLU A/B PCR - Swab, Nasopharynx    Specimen: Nasopharynx; Swab   Result Value Ref Range    COVID19 Not Detected Not Detected - Ref. Range    Influenza A PCR Not Detected Not Detected    Influenza B PCR Not Detected Not Detected   Comprehensive Metabolic Panel    Specimen: Blood   Result Value Ref Range    Glucose 290 (H) 65 - 99 mg/dL    BUN 12 6 - 20 mg/dL    Creatinine 1.09 (H) 0.57 - 1.00 mg/dL    Sodium 133 (L) 136 - 145 mmol/L    Potassium 4.9 3.5 - 5.2 mmol/L    Chloride 95 (L) 98 - 107 mmol/L    CO2 25.0 22.0 - 29.0 mmol/L    Calcium 8.7 8.6 - 10.5 mg/dL    Total Protein 6.9 6.0 - 8.5 g/dL    Albumin 3.20 (L) 3.50 - 5.20 g/dL    ALT (SGPT) 28 1 - 33 U/L    AST (SGOT) 27 1 - 32 U/L    Alkaline Phosphatase 138 (H) 39 - 117 U/L    Total Bilirubin 1.2 0.0 - 1.2 mg/dL    eGFR Non African Amer 51 (L) >60 mL/min/1.73    Globulin 3.7 gm/dL    A/G Ratio 0.9 g/dL    BUN/Creatinine Ratio 11.0 7.0 - 25.0    Anion Gap 13.0 5.0 - 15.0 mmol/L   CBC Auto Differential    Specimen: Blood   Result Value Ref Range    WBC 22.39 (H) 3.40 - 10.80 10*3/mm3    RBC 3.98 3.77 - 5.28 10*6/mm3    Hemoglobin 12.1 12.0 - 15.9 g/dL    Hematocrit 35.6 34.0 - 46.6 %    MCV 89.4 79.0 - 97.0 fL    MCH 30.4 26.6 - 33.0 pg    MCHC 34.0 31.5 - 35.7 g/dL    RDW 13.2 12.3 - 15.4 %    RDW-SD 42.8 37.0 - 54.0 fl    MPV 9.3 6.0 - 12.0 fL    Platelets 432 140 - 450 10*3/mm3    Neutrophil % 81.7 (H) 42.7 - 76.0 %    Lymphocyte % 5.6 (L) 19.6 - 45.3 %    Monocyte % 11.1 5.0 - 12.0 %    Eosinophil % 0.2 (L) 0.3 - 6.2 %    Basophil % 0.3 0.0 - 1.5 %    Immature Grans % 1.1 (H) 0.0 - 0.5 %    Neutrophils, Absolute 18.28 (H) 1.70 - 7.00 10*3/mm3    Lymphocytes, Absolute 1.26 0.70 - 3.10 10*3/mm3    Monocytes, Absolute 2.49 (H) 0.10 - 0.90 10*3/mm3    Eosinophils, Absolute 0.05 0.00 - 0.40 10*3/mm3    Basophils, Absolute 0.07 0.00 - 0.20 10*3/mm3    Immature Grans, Absolute 0.24 (H) 0.00 - 0.05 10*3/mm3    nRBC 0.0 0.0 - 0.2 /100 WBC   Troponin     Specimen: Blood   Result Value Ref Range    Troponin T <0.010 0.000 - 0.030 ng/mL   BNP    Specimen: Blood   Result Value Ref Range    proBNP 714.7 0.0 - 900.0 pg/mL   Lactic Acid, Plasma    Specimen: Blood   Result Value Ref Range    Lactate 2.1 (C) 0.5 - 2.0 mmol/L   Light Blue Top   Result Value Ref Range    Extra Tube hold for add-on    Gold Top - SST   Result Value Ref Range    Extra Tube Hold for add-ons.      *Note: Due to a large number of results and/or encounters for the requested time period, some results have not been displayed. A complete set of results can be found in Results Review.     CT foot left wo contrast    Result Date: 2/11/2021  Narrative: Exam: CT left foot without contrast INDICATION: Orthopedic implant, pseudoarthrosis TECHNIQUE: Routine CT left foot without contrast. Sagittal coronal reconstructions were obtained. This exam was performed according to our departmental dose-optimization program, which includes automated exposure control, adjustment of the mA and/or kV according to patient size and/or use of iterative reconstruction technique. COMPARISON: 2/2/2021, CT 6/5/2020 FINDINGS: The previously screws from the prior CT have been removed and four screws have been placed along across the talocalcaneal and talonavicular joints. The medial anterior screw extends from the medial navicular bone to the posterior lateral talus. The lateral anterior screw extends from the lateral navicular bone to the posterior lateral talus. The anterior ventral loss screw enters the mid calcaneus extends to the talar dome. The posterior ventral screw enters the posterior inferior margin of the calcaneus extends obliquely across the talocalcaneal joint with the tip residing in the anterior aspect of the talar dome. There is at least two bony struts seen medial and lateral to the ventral screws at the talocalcaneal joint. There is less lucency seen at the pseudarthrosis compared to previous CT study. There are  progressive arthritic changes at the talonavicular tibial joint. There is a fracture/mild fragmentation at the talar dome. There is no significant lucency seen along the margins of the four screws. There is also of slightly more fragmentation seen within the posterior calcaneus compared to the previous study. No obvious acute fracture or dislocation.     Impression: 1. Postsurgical changes compatible with surgical arthrodesis at the talocalcaneal joint and talonavicular joint. 2. Decreased lucency at the talocalcaneal arthrodesis site compared to previous CT exam. 2. There is fracture/fragmentation noted along the talar dome with a progressive arthritic change at the talotibial joint compared to the previous exam. Recommendation clinical findings and follow-up as clinically warranted. Electronically signed by:  Jesus Lord MD  2/11/2021 10:18 AM [employer] Workstation: [id number]    Peripheral Block    Result Date: 2/26/2021  Narrative: Rohan Moe, CRNA     2/26/2021  1:03 PM Peripheral Block Patient location during procedure: OR Start time: 2/26/2021 8:55 AM Stop time: 2/26/2021 9:02 AM Reason for block: procedure for pain, at surgeon's request, post-op pain management and secondary anesthetic Performed by Anesthesiologist: Renetta Yost DO Preanesthetic Checklist Completed: patient identified, site marked, surgical consent, pre-op evaluation, timeout performed, IV checked, risks and benefits discussed and monitors and equipment checked Prep: Pt Position: supine Sterile barriers:cap, gloves, sterile barriers and mask Prep: ChloraPrep Patient monitoring: blood pressure monitoring, continuous pulse oximetry and EKG Procedure Sedation:yes Performed under: local infiltration Guidance:ultrasound guided ULTRASOUND INTERPRETATION. Using ultrasound guidance a 21 G gauge needle was placed in close proximity to the nerve, at which point, under ultrasound guidance anesthetic was injected in the area of the nerve and  spread of the anesthesia was seen on ultrasound in close proximity thereto.  There were no abnormalities seen on ultrasound; a digital image was taken; and the patient tolerated the procedure with no complications. Images:still images obtained, printed/placed on chart Laterality:left Block Type:sciatic Injection Technique:single-shot Needle Type:echogenic Needle Gauge:21 G Resistance on Injection: none Catheter Size:20 G Cath Depth at skin: 5 cm Medications Used: ropivacaine (NAROPIN) 0.5 % injection, 30 mL Post Assessment Injection Assessment: negative aspiration for heme, no paresthesia on injection and incremental injection Patient Tolerance:comfortable throughout block Complications:no Additional Notes U/S used throughout and needle seen throughout No complications during procedure     US Venous Doppler Lower Extremity Left (duplex)    Result Date: 3/9/2021  Narrative: Doppler duplex venous examination left lower extremity. CLINICAL INDICATION: Left leg pain.    COMPARISON: None FINDINGS: The common femoral,  femoral, and popliteal veins of the left    lower extremity are well identified and compress normally. Doppler signals are heard either spontaneously or on distal compression.  No evidence of intraluminal thrombus was noted. The visualized posterior calf veins are normal.     Impression: No evidence of deep venous thrombosis in the common femoral, femoral, or popliteal veins of the left     lower extremity. Electronically signed by:  Naveed Taylor MD  3/9/2021 5:49 PM CST Workstation: [id number]    US Venous Doppler Lower Extremity Left (duplex)    Result Date: 2/20/2021  Narrative: Ultrasound venous duplex left lower extremity HISTORY: Left lower extremity pain Duplex ultrasound of the deep venous system of the left lower extremity was performed FINDINGS: Real-time images demonstrate normal compressibility without evidence of intraluminal thrombus. Doppler shows phasic flow and augmentation. Color Doppler also  reveals venous patency. Fatty replaced left inguinal lymph node.     Impression: CONCLUSION: No ultrasound evidence of deep venous thrombosis of the left lower extremity. [id number] Electronically signed by:  David Gamboa MD  2/20/2021 2:24 PM CST Workstation: [id number]    CT Lower Extremity Left Without Contrast    Result Date: 2/20/2021  Narrative: CT left foot without contrast History: Postoperative foot pain Axial scans of the left foot were obtained without intravenous contrast.  Coronal and sagital reconstructions were preformed. This exam was performed according to our departmental dose-optimization program, which includes automated exposure control, adjustment of the mA and/or kV according to patient size and/or use of iterative reconstruction technique. DLP: 233.10 Comparison: February 10, 2021 Findings: Increased dorsal soft tissue swelling. Previously demonstrated tibiotalar joint effusion. Again arthrodesis of the talocalcaneal joint and talonavicular joint with four screws in place. No solid bony fusion. Again there is a fragmentation and lucency. Bone graft remains in place. Previously demonstrated fracture or fragmentation of the talar dome. No new area of bone destruction or periosteal reaction. Disuse osteoporosis. Stable exostosis first metatarsal.     Impression: CONCLUSION: Increased dorsal soft tissue swelling. Otherwise no significant change. [id number] Electronically signed by:  David Gamboa MD  2/20/2021 2:02 PM CST Workstation: [id number]    Patient admitted to  Resident.                                     [facility]    Final diagnoses:   Cellulitis of left lower extremity   Sepsis, due to unspecified organism, unspecified whether acute organ dysfunction present (CMS/[facility])            Ben Porter PA-C  03/09/21 2049

## 2021-03-10 NOTE — PROGRESS NOTES
FAMILY MEDICINE DAILY PROGRESS NOTE    NAME: Ariana Martinez  : 1960  MRN: [id number]      LOS: 1 day     PROVIDER OF SERVICE: Seb Troncoso MD    Chief Complaint: Sepsis (CMS/HCC)    Subjective:     Interval History:  History taken from: patient chart  Patient Complaints: L foot pain  Admitted to floor. On vanc and zosyn. W/u negative for DVT or PE.     Review of Systems:   Review of Systems   Constitutional: Positive for fever. Negative for chills.        Foot pain   HENT: Negative for congestion and sore throat.    Eyes: Negative for pain and redness.   Respiratory: Negative for cough and shortness of breath.    Cardiovascular: Negative for chest pain and palpitations.   Gastrointestinal: Negative for abdominal pain and blood in stool.   Endocrine: Negative for polydipsia and polyphagia.   Genitourinary: Positive for difficulty urinating. Negative for hematuria.   Musculoskeletal: Positive for arthralgias. Negative for neck pain.   Skin: Negative for wound.   Neurological: Negative for dizziness and headaches.   Psychiatric/Behavioral: Negative for hallucinations and sleep disturbance.       Objective:     Vital Signs  Temp:  [98.1 °F (36.7 °C)-102.6 °F (39.2 °C)] 102.6 °F (39.2 °C)  Heart Rate:  [] 112  Resp:  [18-20] 20  BP: (120-145)/(54-70) 142/54   Body mass index is 42.1 kg/m².    Physical Exam  Physical Exam  Vitals and nursing note reviewed.   Constitutional:       General: She is not in acute distress.     Appearance: She is well-developed.   HENT:      Head: Normocephalic and atraumatic.   Eyes:      Conjunctiva/sclera: Conjunctivae normal.   Neck:      Vascular: No JVD.   Cardiovascular:      Rate and Rhythm: Normal rate and regular rhythm.      Heart sounds: Normal heart sounds. No murmur. No friction rub. No gallop.    Pulmonary:      Effort: Pulmonary effort is normal.      Breath sounds: Normal breath sounds. No wheezing or rales.   Abdominal:      General: Bowel sounds are  normal. There is no distension.      Palpations: Abdomen is soft.      Tenderness: There is no abdominal tenderness.   Musculoskeletal:      Comments: Wrapping intact around left lower extremity/foot.   Skin:     General: Skin is warm and dry.      Capillary Refill: Capillary refill takes less than 2 seconds.   Neurological:      Mental Status: She is alert and oriented to person, place, and time.          Scheduled Meds   ARIPiprazole, 15 mg, Oral, Daily  aspirin, 81 mg, Oral, Daily  atorvastatin, 80 mg, Oral, Daily  clopidogrel, 75 mg, Oral, Daily  furosemide, 40 mg, Oral, Daily  gabapentin, 400 mg, Oral, TID  heparin (porcine), 5,000 Units, Subcutaneous, Q12H  insulin aspart, 0-7 Units, Subcutaneous, TID AC  insulin detemir, 60 Units, Subcutaneous, Nightly  metoprolol succinate XL, 25 mg, Oral, Daily  mirtazapine, 45 mg, Oral, Nightly  pantoprazole, 20 mg, Oral, Daily  piperacillin-tazobactam, 3.375 g, Intravenous, Q6H  sodium chloride, 10 mL, Intravenous, Q12H  sodium chloride 0.9% - IBW for BMI > 30, 30 mL/kg (Ideal), Intravenous, Once  venlafaxine XR, 150 mg, Oral, Daily With Breakfast       PRN Meds   •  acetaminophen  •  bisacodyl  •  dextrose  •  dextrose  •  glucagon (human recombinant)  •  HYDROcodone-acetaminophen  •  LORazepam  •  meclizine  •  melatonin  •  metoclopramide  •  ondansetron  •  ondansetron  •  [COMPLETED] Insert peripheral IV **AND** sodium chloride  •  sodium chloride  •  traMADol      Diagnostic Data    Results from last 7 days   Lab Units 03/09/21  1839   WBC 10*3/mm3 22.39*   HEMOGLOBIN g/dL 12.1   HEMATOCRIT % 35.6   PLATELETS 10*3/mm3 432   GLUCOSE mg/dL 290*   CREATININE mg/dL 1.09*   BUN mg/dL 12   SODIUM mmol/L 133*   POTASSIUM mmol/L 4.9   AST (SGOT) U/L 27   ALT (SGPT) U/L 28   ALK PHOS U/L 138*   BILIRUBIN mg/dL 1.2   ANION GAP mmol/L 13.0       US Venous Doppler Lower Extremity Left (duplex)    Result Date: 3/9/2021  No evidence of deep venous thrombosis in the common  femoral, femoral, or popliteal veins of the left     lower extremity. Electronically signed by:  Naveed Taylor MD  3/9/2021 5:49 PM CST Workstation: [id number]    CT Angiogram Chest    Result Date: 3/9/2021  1.  No evidence of pulmonary embolism, though there is limited evaluation of segmental branches due to contrast phase.        2.  No evidence of acute pathology associated with the visualized aorta.    3. Linear to bandlike opacities in the upper lung zones probably represent atelectasis and/or scarring 4. Liver appears prominent, but is incompletely included in the study. 5. Pneumobilia, probably secondary to cholecystectomy and sphincterotomy. Electronically signed by:  Janki Molina MD  3/9/2021 8:54 PM CST Workstation: [id number]        I reviewed the patient's new clinical results.    Assessment/Plan:     Active Hospital Problems    Diagnosis  POA   • **Sepsis (CMS/[facility]) [A41.9]  Unknown     Priority: High     Sepsis secondary to left lower extremity cellulitis. Given 1 L bolus in the ED. Procal elevated.   -Vanc and Zosyn  -Urinalysis and blood culture pending       • Cellulitis of left lower extremity [L03.116]  Yes     Priority: High     CTA chest shows no evidence of pulmonary embolism per radiologist interpretation. Duplex venous ultrasound showed no evidence of DVT per radiology interpretation  -Vanc and Zosyn  -Dr. 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

## 2021-03-10 NOTE — PAYOR COMM NOTE
"Britatny Osborne   [facility]  phone [phone]  fax [phone]    REF# [id number]    Ariana Bailey (61 y.o. Female)     Date of Birth Social Security Number Address Home Phone MRN    1960  [address]  [facility] [address] [phone] [id number]    Zoroastrianism Marital Status          Church        Admission Date Admission Type Admitting Provider Attending Provider Department, Room/Bed    3/9/21 Emergency Tirso Herrera MD Siddiqui, Ashhad, MD [facility] 3 EAST, 372/1    Discharge Date Discharge Disposition Discharge Destination                       Attending Provider: Kiara Whitt MD    Allergies: Seroquel [Quetiapine], Avandia [Rosiglitazone], Morphine And Related, Oxycodone    Isolation: None   Infection: None   Code Status: CPR    Ht: 172.7 cm (68\")   Wt: 126 kg (276 lb 14.4 oz)    Admission Cmt: None   Principal Problem: Sepsis (CMS/HCC) [A41.9] More...                 Active Insurance as of 3/9/2021     Primary Coverage     Payor Plan Insurance Group Employer/Plan Group    [employer] EMPLOYEE [id number]     Payor Plan Address Payor Plan Phone Number Payor Plan Fax Number Effective Dates    [address] [phone]  1/1/2015 - None Entered    [place] [id number]       Subscriber Name Subscriber Birth Date Member ID       ARIANA BAILEY 1960 [id number]                 Emergency Contacts      (Rel.) Home Phone Work Phone Mobile Phone    Nadeem Bailey (Spouse) [phone] -- [phone]    Елена David (Sister) [phone] -- [phone]               History & Physical      Kiara Whitt MD at 03/09/21 2132     Attestation signed by Tirso Herrera MD at 03/10/21 2724    I have performed a history and physical examination of the patient. I have discussed the management of the patient with the resident.  I have reviewed the notes, assessment and plan, and/or procedures  " "performed by the resident. I concur with the resident’s documentation.        Physical Exam:  General: NAD.  CV: S1 and S2 normal. RRR.  Pulmonary: Clear to auscultation bilaterally, no wheezing, no rales.   Abdomen: Bowel sounds present and normal. Abdomen is soft, obese, and nontender   Extremities: dressing noted over the LLE    Plan: 61 y.o. female with a CMH of coronary artery disease, depression, hypertension, type 2 diabetes mellitus and a recent left ankle arthroscopy, exostectomy, hardware removal of the left foot on 2021 admitted for treatment of Cellulitis. Pt on iv antibiotics. Podiatry consulted.                       HISTORY AND PHYSICAL  NAME: Ariana Martinez  : 1960  MRN: [id number]    DATE OF ADMISSION:  3/9/2021     DATE & TIME SEEN: 21 at 2131    PCP: Kimberly Ferguson APRN    CODE STATUS: Full code    CHIEF COMPLAINT: Leg pain and swelling    HPI:  Ariana Martinez is a 61 y.o. female with a CMH of coronary artery disease, depression, hypertension, type 2 diabetes mellitus and a recent left ankle arthroscopy, exostectomy, hardware removal of the left foot on 2021 with Dr. Lord (Podiatry) who presents to the ED with complaints of left lower extremity pain and swelling. Patient states that last night she was hobbling on one leg from her bedside commode to her bed when she heard a \"pop\" on her left foot. Patient had a clinic appointment with Dr. Lord today for follow-up of recent surgery where he completed some blood work. Given that patient developed worsening of lower extremity pain and swelling, Dr. Lord recommended that patient go to the ED to rule out DVT.     In the ED, patient was given Norco for pain, and started on Vanco and Zosyn.  She was also given 1 L bolus.  CTA chest did not show any evidence of PE.  Duplex venous ultrasound did not show any evidence of DVT.  Patient had a lactate of 2.1 and WBC of 22.      CONCURRENT MEDICAL HISTORY:  Past Medical " History:   Diagnosis Date   • Angina, class IV (CMS/[facility])    • Anxiety    • Anxiety and depression    • Arthritis    • Benign paroxysmal positional vertigo    • Bladder disorder     has bladder stimulator   • Carpal tunnel syndrome    • CHF (congestive heart failure) (CMS/[facility])    • Chronic pain    • Coronary atherosclerosis     hx CABG 2005.  is followed by Dr Kwon   • Depression    • Diabetes mellitus (CMS/[facility])     Type 2, controlled   • Diabetic polyneuropathy (CMS/[facility])    • Disease of thyroid gland    • Elevated cholesterol    • Female stress incontinence    • Foot pain, left    • Full dentures    • Gastroparesis    • GERD (gastroesophageal reflux disease)    • Hyperlipidemia    • Hypertension    • Low back pain    • Malaise and fatigue    • Multiple joint pain    • Obesity     Refuses to be weighed   • Otalgia     Both   • Perforation of tympanic membrane     Left   • Postoperative wound infection    • Vitamin D deficiency    • Wears glasses     reading       PAST SURGICAL HISTORY:  Past Surgical History:   Procedure Laterality Date   • ABDOMINAL SURGERY     • ANGIOPLASTY      coronary   • ANKLE ARTHROSCOPY Left 2/26/2021    Procedure: Left foot hardware removal, ankle arthroscopy, bone grafting , left foot exostectomy;  Surgeon: Ignacio Lord DPM;  Location: [facility] OR;  Service: Podiatry;  Laterality: Left;   • BREAST BIOPSY Right    • CARDIAC CATHETERIZATION     • CARDIAC CATHETERIZATION N/A 6/20/2017    Procedure: Right Heart Cath;  Surgeon: Can Kwon MD PhD;  Location: [facility] CATH INVASIVE LOCATION;  Service:    • CARDIAC CATHETERIZATION N/A 2/18/2020    Procedure: Left Heart Cath;  Surgeon: Catalina Cooper MD;  Location: [facility] CATH INVASIVE LOCATION;  Service: Cardiology;  Laterality: N/A;   • CARPAL TUNNEL RELEASE     • CHOLECYSTECTOMY     • COLONOSCOPY N/A 6/24/2020    Procedure: COLONOSCOPY;  Surgeon: Julián Maldonado MD;  Location: [facility] ENDOSCOPY;  Service:  Gastroenterology;  Laterality: N/A;   • CORONARY ARTERY BYPASS GRAFT  2005   • ENDOSCOPY N/A 10/19/2018    Procedure: ESOPHAGOGASTRODUODENOSCOPY possible dilation;  Surgeon: Julián Maldonado MD;  Location: [facility] ENDOSCOPY;  Service: Gastroenterology   • ENDOSCOPY N/A 6/24/2020    Procedure: ESOPHAGOGASTRODUODENOSCOPY WED appt please;  Surgeon: Julián Maldonado MD;  Location: [facility] ENDOSCOPY;  Service: Gastroenterology;  Laterality: N/A;   • ENDOSCOPY AND COLONOSCOPY     • FOOT SURGERY      Toes   • FOOT SURGERY     • GASTRIC BANDING      Revision, laparoscopic   • HYSTERECTOMY     • MOUTH SURGERY     • SALPINGO OOPHORECTOMY     • SHOULDER SURGERY     • SUBTALAR ARTHRODESIS Left 1/16/2019    Procedure: LEFT FOOT HARDWARE REMOVAL, FIFTH METATARSAL , OPEN REDUCTION INTERNAL FIXATION, CALCANEAL OSTEOTOMY;  Surgeon: Ignacio Lord DPM;  Location: [facility] OR;  Service: Podiatry   • SUBTALAR ARTHRODESIS Left 10/16/2019    Procedure: foot hardware removal, subtalar joint fusion  possible de/reattachment of achilles tendon        (c-arm);  Surgeon: Ignacio Lord DPM;  Location: [facility] OR;  Service: Podiatry   • SUBTALAR ARTHRODESIS Left 9/30/2020    Procedure: subtalar, talonavicular joint arthrodesis.  Removal hardware.          (c-arm);  Surgeon: Ignacio Lord DPM;  Location: [facility] OR;  Service: Podiatry;  Laterality: Left;   • TRANSESOPHAGEAL ECHOCARDIOGRAM (LAMONTE)      With color flow       FAMILY HISTORY:  Family History   Problem Relation Age of Onset   • Diabetes Other    • Heart disease Other    • Hypertension Other    • Heart disease Mother    • Stroke Mother    • Hypertension Mother    • Diabetes Sister    • Heart disease Sister    • Hypertension Sister    • Heart disease Sister    • Diabetes Sister    • Hypertension Sister    • Diabetes Sister    • Diabetes Sister    • Diabetes Sister    • Diabetes Sister         SOCIAL HISTORY:  Social History     Socioeconomic History   • Marital status:       Spouse name: Not on file   • Number of children: Not on file   • Years of education: Not on file   • Highest education level: Not on file   Tobacco Use   • Smoking status: Never Smoker   • Smokeless tobacco: Never Used   Vaping Use   • Vaping Use: Never used   Substance and Sexual Activity   • Alcohol use: No   • Drug use: No   • Sexual activity: Defer       HOME MEDICATIONS:  Prior to Admission medications    Medication Sig Start Date End Date Taking? Authorizing Provider   Accu-Chek Iris Plus test strip USE TO CHECK BLOOD SUGAR 4 TIMES DAILY. ACCUCHECK, E11.9 1/13/21   Elvis Gamboa APRN   ARIPiprazole (ABILIFY) 15 MG tablet Take 1 tablet by mouth Daily. 3/14/19   Francisca Fong MD   aspirin 81 MG chewable tablet Chew 81 mg daily.    Provider, MD Esther   atorvastatin (LIPITOR) 80 MG tablet TAKE 1 TABLET BY MOUTH EVERY DAY 12/22/20   Marty, BEBE Luu   BD SHARPS CONTAINER HOME misc 1 each Take As Directed. 9/7/18   Francisca Fong MD   Blood Glucose Monitoring Suppl (ONE TOUCH ULTRA MINI) w/Device kit USE AS DIRECTED TO CHECK BLOOD SUGAR 7/11/18   Francisca Fong MD   Calcium Citrate-Vitamin D (CITRACAL/VITAMIN D) 250-200 MG-UNIT tablet Take 2 tablets by mouth 2 (two) times a day.    Provider, MD Esther   clopidogrel (PLAVIX) 75 MG tablet Take 1 tablet by mouth Daily. 12/23/20   Geri Baig MD   cyanocobalamin 1000 MCG/ML injection INJECT 1 ML INTO THE APPROPRIATE MUSCLE AS DIRECTED BY PRESCRIBER EVERY 28 (TWENTY-EIGHT) DAYS. 12/23/20   Geri Baig MD   doxycycline (VIBRAMYCIN) 100 MG capsule Take 1 capsule by mouth 2 (Two) Times a Day. 3/9/21   Ignacio Lord DPM   folic acid (FOLVITE) 1 MG tablet Take 1 tablet by mouth Daily. 12/22/20   Teressa Hammond APRN   furosemide (LASIX) 40 MG tablet TAKE 1 TABLET BY MOUTH EVERY DAY  Patient taking differently: Take 40 mg by mouth Daily. 8/31/20   Ferguson, BEBE Luu   gabapentin (NEURONTIN) 400 MG  capsule Take 1 capsule by mouth 3 (Three) Times a Day. 1/31/21   Ferguson, BEBE Luu   GLUCAGON EMERGENCY 1 MG injection USE AS DIRECTED AS NEEDED 7/28/17   Elvis Gamboa APRN   glucose monitor monitoring kit 1 each Daily. accucheck eve meter, E11.9 6/11/20   Elvis Gamboa APRN   hydroCHLOROthiazide (HYDRODIURIL) 12.5 MG tablet Take 12.5 mg by mouth Daily.    Provider, MD Esther   Insulin Glargine (BASAGLAR KWIKPEN) 100 UNIT/ML injection pen Inject 100 units under skin in the the appropriate area as directed every night.   Patient taking differently: 60 Units Every Night. 6/9/20   Elvis Gamboa APRN   Insulin Pen Needle (B-D ULTRAFINE III SHORT PEN) 31G X 8 MM misc USE TO INJECT 4 TIMES A DAY 12/27/20   Elvis Gamboa APRN   LORazepam (ATIVAN) 0.5 MG tablet TAKE 1 TABLET BY MOUTH EVERY DAY AS NEEDED FOR ANXIETY 3/8/21   Ferguson, BEBE Luu   meclizine (ANTIVERT) 25 MG tablet Take 1 tablet by mouth 3 (Three) Times a Day As Needed for dizziness. 12/14/17   Evon Hernandez APRN   metoclopramide (REGLAN) 10 MG tablet TAKE 1 TABLET BY MOUTH FOUR TIMES A DAY AS NEEDED 1/27/21   Marcela Lawson APRN   metoprolol succinate XL (TOPROL-XL) 25 MG 24 hr tablet TAKE 1 TABLET BY MOUTH EVERY DAY  Patient taking differently: Take 25 mg by mouth Daily. 6/1/20   Bill Gibson MD   mirtazapine (REMERON) 45 MG tablet TAKE 1 TABLET BY MOUTH EVERY NIGHT. 7/17/19   Francisca Fong MD   NOVOLOG FLEXPEN 100 UNIT/ML solution pen-injector sc pen INJECT 60 UNITS BEFORE MEALS 3 TIMES A DAY  Patient taking differently: Inject 60 Units under the skin into the appropriate area as directed 3 (Three) Times a Day With Meals. 4/6/20   Baldemar Melendez MD   ondansetron (ZOFRAN) 8 MG tablet TAKE 1 TABLET BY MOUTH EVERY 8 (EIGHT) HOURS AS NEEDED FOR NAUSEA OR VOMITING. 1/4/21   Ferguson, BEBE Luu   pantoprazole (PROTONIX) 20 MG EC tablet TAKE 1 TABLET BY MOUTH  "EVERY DAY  Patient taking differently: Take 20 mg by mouth Daily. 12/23/20   Geri Baig MD   Syringe/Needle, Disp, (Luer Lock Safety Syringes) 25G X 5/8\" 3 ML misc 1 each Daily. 1 Q28 DAYS 4/16/20   Ferguson, BEBE Luu   traMADol (ULTRAM) 50 MG tablet Take 1 tablet by mouth Every 6 (Six) Hours As Needed for Moderate Pain  (pain). 3/2/21   Ignacio Lord DPM   venlafaxine XR (EFFEXOR-XR) 150 MG 24 hr capsule TAKE 1 CAPSULE BY MOUTH TWICE A DAY 3/8/21   Ferguson, BEBE Luu   vitamin D (ERGOCALCIFEROL) 1.25 MG (18065 UT) capsule capsule TAKE ONE CAPSULE BY MOUTH EVERY SUNDAY.  Patient taking differently: Take 50,000 Units by mouth Every 7 (Seven) Days. 8/24/20   Ferguson, BEBE Luu       ALLERGIES:  Seroquel [quetiapine], Avandia [rosiglitazone], Morphine and related, and Oxycodone    REVIEW OF SYSTEMS  Review of Systems   Constitutional: Negative for chills and fever.   HENT: Negative for facial swelling and trouble swallowing.    Eyes: Negative for photophobia and visual disturbance.   Respiratory: Positive for shortness of breath. Negative for apnea, cough and chest tightness.    Cardiovascular: Positive for leg swelling. Negative for chest pain.        Left leg swelling   Gastrointestinal: Negative for abdominal distention, abdominal pain, constipation, diarrhea, nausea and vomiting.   Genitourinary: Negative for pelvic pain.   Musculoskeletal: Positive for gait problem. Negative for arthralgias and myalgias.   Neurological: Negative for dizziness, seizures, speech difficulty and weakness.   Psychiatric/Behavioral: Negative for confusion and hallucinations.       PHYSICAL EXAM:  Temp:  [98.1 °F (36.7 °C)-98.8 °F (37.1 °C)] 98.2 °F (36.8 °C)  Heart Rate:  [] 103  Resp:  [18] 18  BP: (120-145)/(54-70) 145/66  Body mass index is 42.1 kg/m².  Physical Exam  Constitutional:       Appearance: She is well-developed.   HENT:      Head: Normocephalic and atraumatic.      Nose: Nose normal.   " "  Eyes:      Conjunctiva/sclera: Conjunctivae normal.      Pupils: Pupils are equal, round, and reactive to light.   Cardiovascular:      Rate and Rhythm: Normal rate and regular rhythm.      Pulses: Normal pulses.      Heart sounds: Normal heart sounds.   Pulmonary:      Effort: Pulmonary effort is normal. No respiratory distress.      Breath sounds: Normal breath sounds.   Abdominal:      General: Abdomen is flat. Bowel sounds are normal. There is no distension.      Palpations: Abdomen is soft.   Musculoskeletal:         General: Normal range of motion.      Cervical back: Normal range of motion and neck supple.      Left lower leg: Edema present.   Skin:     General: Skin is warm and dry.   Neurological:      General: No focal deficit present.      Mental Status: She is alert and oriented to person, place, and time. Mental status is at baseline.      Cranial Nerves: No cranial nerve deficit.   Psychiatric:         Mood and Affect: Mood normal.         Behavior: Behavior normal.         DIAGNOSTIC DATA:   Lab Results (last 24 hours)     Procedure Component Value Units Date/Time    Procalcitonin [[id number]]  (Abnormal) Collected: 03/09/21 1839    Specimen: Blood Updated: 03/10/21 0022     Procalcitonin 0.53 ng/mL     Narrative:      As a Marker for Sepsis (Non-Neonates):   1. <0.5 ng/mL represents a low risk of severe sepsis and/or septic shock.  1. >2 ng/mL represents a high risk of severe sepsis and/or septic shock.    As a Marker for Lower Respiratory Tract Infections that require antibiotic therapy:  PCT on Admission     Antibiotic Therapy             6-12 Hrs later  > 0.5                Strongly Recommended            >0.25 - <0.5         Recommended  0.1 - 0.25           Discouraged                   Remeasure/reassess PCT  <0.1                 Strongly Discouraged          Remeasure/reassess PCT      As 28 day mortality risk marker: \"Change in Procalcitonin Result\" (> 80 % or <=80 %) if Day 0 (or Day 1) and " Day 4 values are available. Refer to http://www.[facility]-pct-calculator.com/   Change in PCT <=80 %   A decrease of PCT levels below or equal to 80 % defines a positive change in PCT test result representing a higher risk for 28-day all-cause mortality of patients diagnosed with severe sepsis or septic shock.  Change in PCT > 80 %   A decrease of PCT levels of more than 80 % defines a negative change in PCT result representing a lower risk for 28-day all-cause mortality of patients diagnosed with severe sepsis or septic shock.                Results may be falsely decreased if patient taking Biotin.     POC Glucose Once [[id number]]  (Abnormal) Collected: 03/09/21 2225    Specimen: Blood Updated: 03/09/21 2237     Glucose 295 mg/dL      Comment: RN NotifiedOperator: [id number] PEGGY Garcia ID: [id number]       Extra Tubes [[id number]] Collected: 03/09/21 1938    Specimen: Blood, Venous Line Updated: 03/09/21 2045    Narrative:      The following orders were created for panel order Extra Tubes.  Procedure                               Abnormality         Status                     ---------                               -----------         ------                     Gold Top - SST[[id number]]                                   Final result                 Please view results for these tests on the individual orders.    Gold Top - SST [[id number]] Collected: 03/09/21 1938    Specimen: Blood Updated: 03/09/21 2045     Extra Tube Hold for add-ons.     Comment: Auto resulted.       Extra Tubes [[id number]] Collected: 03/09/21 1912    Specimen: Blood, Venous Line Updated: 03/09/21 2015    Narrative:      The following orders were created for panel order Extra Tubes.  Procedure                               Abnormality         Status                     ---------                               -----------         ------                     Light Blue Top[[id number]]                                   Final result                   Please view results for these tests on the individual orders.    Light Blue Top [[id number]] Collected: 03/09/21 1912    Specimen: Blood Updated: 03/09/21 2015     Extra Tube hold for add-on     Comment: Auto resulted       COVID-19 and FLU A/B PCR - Swab, Nasopharynx [[id number]]  (Normal) Collected: 03/09/21 1922    Specimen: Swab from Nasopharynx Updated: 03/09/21 2003     COVID19 Not Detected     Influenza A PCR Not Detected     Influenza B PCR Not Detected    Narrative:      Fact sheet for providers: https://www.fda.gov/media/026980/download    Fact sheet for patients: https://www.fda.gov/media/885473/download    Test performed by PCR.    Lactic Acid, Plasma [[id number]]  (Abnormal) Collected: 03/09/21 1931    Specimen: Blood Updated: 03/09/21 2000     Lactate 2.1 mmol/L     Blood Culture - Blood, Blood, Venous Line [[id number]] Collected: 03/09/21 1931    Specimen: Blood, Venous Line Updated: 03/09/21 1942    Blood Culture - Blood, Blood, Venous Line [[id number]] Collected: 03/09/21 1931    Specimen: Blood, Venous Line Updated: 03/09/21 1939    Comprehensive Metabolic Panel [[id number]]  (Abnormal) Collected: 03/09/21 1839    Specimen: Blood Updated: 03/09/21 1909     Glucose 290 mg/dL      BUN 12 mg/dL      Creatinine 1.09 mg/dL      Sodium 133 mmol/L      Potassium 4.9 mmol/L      Comment: Slight hemolysis detected by analyzer. Results may be affected.        Chloride 95 mmol/L      CO2 25.0 mmol/L      Calcium 8.7 mg/dL      Total Protein 6.9 g/dL      Albumin 3.20 g/dL      ALT (SGPT) 28 U/L      AST (SGOT) 27 U/L      Alkaline Phosphatase 138 U/L      Total Bilirubin 1.2 mg/dL      eGFR Non African Amer 51 mL/min/1.73      Globulin 3.7 gm/dL      A/G Ratio 0.9 g/dL      BUN/Creatinine Ratio 11.0     Anion Gap 13.0 mmol/L     Narrative:      GFR Normal >60  Chronic Kidney Disease <60  Kidney Failure <15      Troponin [[id number]]  (Normal) Collected: 03/09/21 1839    Specimen: Blood Updated: 03/09/21 1900      Troponin T <0.010 ng/mL     Narrative:      Troponin T Reference Range:  <= 0.03 ng/mL-   Negative for AMI  >0.03 ng/mL-     Abnormal for myocardial necrosis.  Clinicians would have to utilize clinical acumen, EKG, Troponin and serial changes to determine if it is an Acute Myocardial Infarction or myocardial injury due to an underlying chronic condition.       Results may be falsely decreased if patient taking Biotin.      BNP [[id number]]  (Normal) Collected: 03/09/21 1839    Specimen: Blood Updated: 03/09/21 1903     proBNP 714.7 pg/mL     Narrative:      Among patients with dyspnea, NT-proBNP is highly sensitive for the detection of acute congestive heart failure. In addition NT-proBNP of <300 pg/ml effectively rules out acute congestive heart failure with 99% negative predictive value.    Results may be falsely decreased if patient taking Biotin.      CBC & Differential [[id number]]  (Abnormal) Collected: 03/09/21 1839    Specimen: Blood Updated: 03/09/21 1850    Narrative:      The following orders were created for panel order CBC & Differential.  Procedure                               Abnormality         Status                     ---------                               -----------         ------                     CBC Auto Differential[[id number]]        Abnormal            Final result                 Please view results for these tests on the individual orders.    CBC Auto Differential [[id number]]  (Abnormal) Collected: 03/09/21 1839    Specimen: Blood Updated: 03/09/21 1850     WBC 22.39 10*3/mm3      RBC 3.98 10*6/mm3      Hemoglobin 12.1 g/dL      Hematocrit 35.6 %      MCV 89.4 fL      MCH 30.4 pg      MCHC 34.0 g/dL      RDW 13.2 %      RDW-SD 42.8 fl      MPV 9.3 fL      Platelets 432 10*3/mm3      Neutrophil % 81.7 %      Lymphocyte % 5.6 %      Monocyte % 11.1 %      Eosinophil % 0.2 %      Basophil % 0.3 %      Immature Grans % 1.1 %      Neutrophils, Absolute 18.28 10*3/mm3      Lymphocytes,  Absolute 1.26 10*3/mm3      Monocytes, Absolute 2.49 10*3/mm3      Eosinophils, Absolute 0.05 10*3/mm3      Basophils, Absolute 0.07 10*3/mm3      Immature Grans, Absolute 0.24 10*3/mm3      nRBC 0.0 /100 WBC            Imaging Results (Last 24 Hours)     Procedure Component Value Units Date/Time    CT Angiogram Chest [[id number]] Collected: 03/09/21 2001     Updated: 03/09/21 2056    Narrative:      PROCEDURE: CT ANGIOGRAM CHEST      .        EXAM:  Computed Tomography with CTA         REGION:  Chest         INDICATION:   Chest pain, tachycardia, cellulitis of lower  extremity, sepsis  - rule out pulmonary embolism         CORRELATIVE IMAGING:  None                          TECHNIQUE:             - PE / vascular protocol               - reconstructions:  axial, coronal, sagittal, obliques     - computer-generated 3D reconstructions (MIPS) were performed.                - contrast:  intravenous 90 mL of Isovue-370                         This exam was performed according to our departmental  dose-optimization program, which includes automated exposure  control, adjustment of the mA and/or kV according to patient size  and/or use of iterative reconstruction technique.  DLP is 713.0              COMMENTS:                               - Pulmonary arterial system:      - Main pulmonary artery trunk:  negative      - Left, right main pulmonary arteries: negative      - Lobar arteries: negative      - Segmental arteries: Limited evaluation of segmental  branches due to contrast phase       - Systemic vascularity (as visualized):        - Aorta: Normal caliber. Atherosclerotic vascular  calcification      - roots of great vessels: Normal caliber.      - SVC / IVC:  grossly negative / normal caliber         - Misc (limited visualization):      - pulmonary parenchyma:  10 year to band like opacities in  the lower lobes bilaterally, likely representing atelectasis or  scarring. Right hemidiaphragm is elevated      - pleura:   negative      - mediastinal / maria guadalupe:  negative      - neck, inferior:  grossly wnl      - subdiaphragmatic structures: The liver appears prominent.  There is pneumobilia, probably secondary to sphincterotomy. The  gallbladder is surgically absent  - osseous:  No acute abnormality identified            .      Impression:      1.  No evidence of pulmonary embolism, though there is limited  evaluation of segmental branches due to contrast phase.          2.  No evidence of acute pathology associated with the visualized  aorta.      3. Linear to bandlike opacities in the upper lung zones probably  represent atelectasis and/or scarring  4. Liver appears prominent, but is incompletely included in the  study.  5. Pneumobilia, probably secondary to cholecystectomy and  sphincterotomy.    Electronically signed by:  Janki Molina MD  3/9/2021 8:54 PM CST  Workstation: [id number]    US Venous Doppler Lower Extremity Left (duplex) [[id number]] Collected: 03/09/21 1743     Updated: 03/09/21 1752    Narrative:      Doppler duplex venous examination left lower extremity.     CLINICAL INDICATION: Left leg pain.          COMPARISON: None    FINDINGS:    The common femoral,  femoral, and popliteal veins of the left      lower extremity are well identified and compress normally.   Doppler signals are heard either spontaneously or on distal  compression.  No evidence of intraluminal thrombus was noted.     The visualized posterior calf veins are normal.      Impression:      No evidence of deep venous thrombosis in the common  femoral, femoral, or popliteal veins of the left     lower  extremity.    Electronically signed by:  Naveed Taylor MD  3/9/2021 5:49 PM CST  Workstation: [id number]            I reviewed the patient's new clinical results.    ASSESSMENT AND PLAN: This is a 61 y.o. female with:    Active Hospital Problems    Diagnosis  POA   • **Sepsis (CMS/HCC) [A41.9]  Unknown     Priority: High     Results from last 7 days   Lab  Units 03/09/21  1931 03/09/21  1839   WBC 10*3/mm3  --  22.39*   LACTATE mmol/L 2.1*  --    -Sepsis secondary to left lower extremity cellulitis  -Given 1 L bolus in the ED  -Reflex lactate pending  -Vanc and Zosyn  -We will obtain urinalysis with cultures  -We will obtain blood culture  -We will obtain pro-Carlito       • Cellulitis of left lower extremity [L03.116]  Yes     Priority: High     -CTA chest shows no evidence of pulmonary embolism per radiologist interpretation  -Duplex venous ultrasound showed no evidence of DVT per radiology interpretation  -Vanc and Zosyn  -Dr. 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  The goal is to keep the pain level 2-5/10.  To help achieve this, I plan to use prn tylenol.    LESTER reviewed via PDMP and consistent with patient reported medications.    Expected Length of Stay: home in 0-1 days    I discussed the patient's findings and my recommendations with patient.     Tirso Herrera MD is the attending on record at time of admission, He is aware of the patient's status and  "agrees with the above history and physical.              This document has been electronically signed by Kiara Whitt MD on March 10, 2021 04:35 CST          Electronically signed by Tirso Herrera MD at 03/10/21 1135          Emergency Department Notes      Thea Yu RN at 03/09/21 1708        Pt comes in today after being sent over by her doctor after a blood test for blood clots was \"high\". She had surgery on her L foot/ ankle almost two weeks ago. She reports increased pain and burning to the L calf.     She is alert and oriented x4. Tearful at times. Dressing in place to L lower leg, aircast removed. Redness and edema noted to L lower leg. Cap refill <3 seconds, pedal pulse present.     Electronically signed by Thea Yu RN at 03/09/21 1710     Ben Porter PA-C at 03/09/21 1845      Procedure Orders    1. ECG 12 Lead [[id number]] ordered by Ben Porter PA-C          Attestation signed by True Crocker MD at 03/09/21 2151          For this patient encounter, I reviewed the NP or PA documentation, treatment plan, and medical decision making. True Crocker MD 3/9/2021 21:51 CST                  Subjective   Patient presents to emergency department for left lower leg pain/swelling.  History of recent surgery by Dr. Lord, podiatrist.  She was sent here for DVT rule out.  Patient also endorses shortness of breath/chest pain mild intermittent over the past 3 days.  She has been off her Plavix for several days due to surgery on 2/26/2021.      History provided by:  Patient   used: No    Leg Swelling  Location:  Left lower leg/ankle  Severity:  Mild  Chronicity:  New  Associated symptoms: myalgias and rash    Associated symptoms: no abdominal pain, no chest pain, no fever, no nausea, no shortness of breath, no sore throat, no vomiting and no wheezing        Review of Systems   Constitutional: Negative for chills and fever.   HENT: Negative for sore " throat and trouble swallowing.    Respiratory: Negative for shortness of breath and wheezing.    Cardiovascular: Negative for chest pain.   Gastrointestinal: Negative for abdominal pain, nausea and vomiting.   Genitourinary: Negative for dysuria and flank pain.   Musculoskeletal: Positive for arthralgias and myalgias.   Skin: Positive for color change and rash.   Allergic/Immunologic: Negative for immunocompromised state.   Neurological: Negative for syncope and weakness.   Hematological: Does not bruise/bleed easily.   Psychiatric/Behavioral: Negative for confusion.       Past Medical History:   Diagnosis Date   • Angina, class IV (CMS/[facility])    • Anxiety    • Anxiety and depression    • Arthritis    • Benign paroxysmal positional vertigo    • Bladder disorder     has bladder stimulator   • Carpal tunnel syndrome    • Chronic pain    • Coronary atherosclerosis     hx CABG 2005.  is followed by Dr Kwon   • Depression    • Diabetes mellitus (CMS/[facility])     Type 2, controlled   • Diabetic polyneuropathy (CMS/[facility])    • Disease of thyroid gland    • Elevated cholesterol    • Female stress incontinence    • Foot pain, left    • Full dentures    • Gastroparesis    • GERD (gastroesophageal reflux disease)    • Hyperlipidemia    • Hypertension    • Low back pain    • Malaise and fatigue    • Multiple joint pain    • Obesity     Refuses to be weighed   • Otalgia     Both   • Perforation of tympanic membrane     Left   • Postoperative wound infection    • Vitamin D deficiency    • Wears glasses     reading       Allergies   Allergen Reactions   • Seroquel [Quetiapine] Anaphylaxis   • Avandia [Rosiglitazone] Swelling   • Morphine And Related Hallucinations   • Oxycodone Hallucinations       Past Surgical History:   Procedure Laterality Date   • ABDOMINAL SURGERY     • ANGIOPLASTY      coronary   • ANKLE ARTHROSCOPY Left 2/26/2021    Procedure: Left foot hardware removal, ankle arthroscopy, bone grafting , left foot  exostectomy;  Surgeon: Ignacio Lord DPM;  Location: [facility] OR;  Service: Podiatry;  Laterality: Left;   • BREAST BIOPSY Right    • CARDIAC CATHETERIZATION     • CARDIAC CATHETERIZATION N/A 6/20/2017    Procedure: Right Heart Cath;  Surgeon: Can Kwon MD PhD;  Location: [facility] CATH INVASIVE LOCATION;  Service:    • CARDIAC CATHETERIZATION N/A 2/18/2020    Procedure: Left Heart Cath;  Surgeon: Catalina Copoer MD;  Location: [facility] CATH INVASIVE LOCATION;  Service: Cardiology;  Laterality: N/A;   • CARPAL TUNNEL RELEASE     • CHOLECYSTECTOMY     • COLONOSCOPY N/A 6/24/2020    Procedure: COLONOSCOPY;  Surgeon: Julián Maldonado MD;  Location: [facility] ENDOSCOPY;  Service: Gastroenterology;  Laterality: N/A;   • CORONARY ARTERY BYPASS GRAFT  2005   • ENDOSCOPY N/A 10/19/2018    Procedure: ESOPHAGOGASTRODUODENOSCOPY possible dilation;  Surgeon: Julián Maldonado MD;  Location: [facility] ENDOSCOPY;  Service: Gastroenterology   • ENDOSCOPY N/A 6/24/2020    Procedure: ESOPHAGOGASTRODUODENOSCOPY WED appt please;  Surgeon: Julián Maldonado MD;  Location: [facility] ENDOSCOPY;  Service: Gastroenterology;  Laterality: N/A;   • ENDOSCOPY AND COLONOSCOPY     • FOOT SURGERY      Toes   • FOOT SURGERY     • GASTRIC BANDING      Revision, laparoscopic   • HYSTERECTOMY     • MOUTH SURGERY     • SALPINGO OOPHORECTOMY     • SHOULDER SURGERY     • SUBTALAR ARTHRODESIS Left 1/16/2019    Procedure: LEFT FOOT HARDWARE REMOVAL, FIFTH METATARSAL , OPEN REDUCTION INTERNAL FIXATION, CALCANEAL OSTEOTOMY;  Surgeon: Ignacio oLrd DPM;  Location: [facility] OR;  Service: Podiatry   • SUBTALAR ARTHRODESIS Left 10/16/2019    Procedure: foot hardware removal, subtalar joint fusion  possible de/reattachment of achilles tendon        (c-arm);  Surgeon: Ignacio Lord DPM;  Location: [facility] OR;  Service: Podiatry   • SUBTALAR ARTHRODESIS Left 9/30/2020    Procedure: subtalar, talonavicular joint arthrodesis.  Removal hardware.         "  (c-arm);  Surgeon: Ignacio Lord DPM;  Location: [facility];  Service: Podiatry;  Laterality: Left;   • TRANSESOPHAGEAL ECHOCARDIOGRAM (LAMONTE)      With color flow       Family History   Problem Relation Age of Onset   • Diabetes Other    • Heart disease Other    • Hypertension Other    • Heart disease Mother    • Stroke Mother    • Hypertension Mother    • Diabetes Sister    • Heart disease Sister    • Hypertension Sister    • Heart disease Sister    • Diabetes Sister    • Hypertension Sister    • Diabetes Sister    • Diabetes Sister    • Diabetes Sister    • Diabetes Sister        Social History     Socioeconomic History   • Marital status:      Spouse name: Not on file   • Number of children: Not on file   • Years of education: Not on file   • Highest education level: Not on file   Tobacco Use   • Smoking status: Never Smoker   • Smokeless tobacco: Never Used   Vaping Use   • Vaping Use: Never used   Substance and Sexual Activity   • Alcohol use: No   • Drug use: No   • Sexual activity: Defer           Objective      /60 (BP Location: Left arm, Patient Position: Lying)   Pulse 107   Temp 98.8 °F (37.1 °C) (Temporal)   Resp 18   Ht 172.7 cm (68\")   Wt 117 kg (258 lb)   SpO2 92%   BMI 39.23 kg/m²     Physical Exam  Vitals and nursing note reviewed.   Constitutional:       Appearance: Normal appearance.   HENT:      Head: Normocephalic and atraumatic.      Mouth/Throat:      Mouth: Mucous membranes are moist.   Eyes:      Conjunctiva/sclera: Conjunctivae normal.   Cardiovascular:      Rate and Rhythm: Normal rate and regular rhythm.      Pulses: Normal pulses.      Heart sounds: Normal heart sounds.   Pulmonary:      Effort: Pulmonary effort is normal. No respiratory distress.      Breath sounds: Normal breath sounds. No wheezing.   Musculoskeletal:         General: Swelling and tenderness present.      Comments: Left lower leg/ankle     Skin:     General: Skin is warm.      Capillary " Refill: Capillary refill takes less than 2 seconds.      Findings: Erythema present.   Neurological:      General: No focal deficit present.      Mental Status: She is alert.   Psychiatric:         Mood and Affect: Mood normal.         Behavior: Behavior normal.         Thought Content: Thought content normal.         ECG 12 Lead      Date/Time: 3/9/2021 8:46 PM  Performed by: Ben Porter PA-C  Authorized by: Ben Porter PA-C   Interpreted by physician  Comparison: compared with previous ECG from 2/26/2021  Similar to previous ECG  Rhythm: sinus tachycardia  Rate: tachycardic  BPM: 106  ST Segments: ST segments normal  Clinical impression: non-specific ECG                ED Course  ED Course as of Mar 09 2049   Tue Mar 09, 2021   1849 Discussed with Dr. Lord, podiatrist.  If patient does not have DVT he thinks she can be discharged home.  He has prescribed her doxycycline for possible postoperative infection or left foot/ankle.      [AME]   2026 Paged FP Resident on call.      [AME]      ED Course User Index  [AME] Ben Porter PA-C      Results for orders placed or performed during the hospital encounter of 03/09/21   COVID-19 and FLU A/B PCR - Swab, Nasopharynx    Specimen: Nasopharynx; Swab   Result Value Ref Range    COVID19 Not Detected Not Detected - Ref. Range    Influenza A PCR Not Detected Not Detected    Influenza B PCR Not Detected Not Detected   Comprehensive Metabolic Panel    Specimen: Blood   Result Value Ref Range    Glucose 290 (H) 65 - 99 mg/dL    BUN 12 6 - 20 mg/dL    Creatinine 1.09 (H) 0.57 - 1.00 mg/dL    Sodium 133 (L) 136 - 145 mmol/L    Potassium 4.9 3.5 - 5.2 mmol/L    Chloride 95 (L) 98 - 107 mmol/L    CO2 25.0 22.0 - 29.0 mmol/L    Calcium 8.7 8.6 - 10.5 mg/dL    Total Protein 6.9 6.0 - 8.5 g/dL    Albumin 3.20 (L) 3.50 - 5.20 g/dL    ALT (SGPT) 28 1 - 33 U/L    AST (SGOT) 27 1 - 32 U/L    Alkaline Phosphatase 138 (H) 39 - 117 U/L    Total Bilirubin 1.2 0.0 -  1.2 mg/dL    eGFR Non African Amer 51 (L) >60 mL/min/1.73    Globulin 3.7 gm/dL    A/G Ratio 0.9 g/dL    BUN/Creatinine Ratio 11.0 7.0 - 25.0    Anion Gap 13.0 5.0 - 15.0 mmol/L   CBC Auto Differential    Specimen: Blood   Result Value Ref Range    WBC 22.39 (H) 3.40 - 10.80 10*3/mm3    RBC 3.98 3.77 - 5.28 10*6/mm3    Hemoglobin 12.1 12.0 - 15.9 g/dL    Hematocrit 35.6 34.0 - 46.6 %    MCV 89.4 79.0 - 97.0 fL    MCH 30.4 26.6 - 33.0 pg    MCHC 34.0 31.5 - 35.7 g/dL    RDW 13.2 12.3 - 15.4 %    RDW-SD 42.8 37.0 - 54.0 fl    MPV 9.3 6.0 - 12.0 fL    Platelets 432 140 - 450 10*3/mm3    Neutrophil % 81.7 (H) 42.7 - 76.0 %    Lymphocyte % 5.6 (L) 19.6 - 45.3 %    Monocyte % 11.1 5.0 - 12.0 %    Eosinophil % 0.2 (L) 0.3 - 6.2 %    Basophil % 0.3 0.0 - 1.5 %    Immature Grans % 1.1 (H) 0.0 - 0.5 %    Neutrophils, Absolute 18.28 (H) 1.70 - 7.00 10*3/mm3    Lymphocytes, Absolute 1.26 0.70 - 3.10 10*3/mm3    Monocytes, Absolute 2.49 (H) 0.10 - 0.90 10*3/mm3    Eosinophils, Absolute 0.05 0.00 - 0.40 10*3/mm3    Basophils, Absolute 0.07 0.00 - 0.20 10*3/mm3    Immature Grans, Absolute 0.24 (H) 0.00 - 0.05 10*3/mm3    nRBC 0.0 0.0 - 0.2 /100 WBC   Troponin    Specimen: Blood   Result Value Ref Range    Troponin T <0.010 0.000 - 0.030 ng/mL   BNP    Specimen: Blood   Result Value Ref Range    proBNP 714.7 0.0 - 900.0 pg/mL   Lactic Acid, Plasma    Specimen: Blood   Result Value Ref Range    Lactate 2.1 (C) 0.5 - 2.0 mmol/L   Light Blue Top   Result Value Ref Range    Extra Tube hold for add-on    Gold Top - SST   Result Value Ref Range    Extra Tube Hold for add-ons.      *Note: Due to a large number of results and/or encounters for the requested time period, some results have not been displayed. A complete set of results can be found in Results Review.     CT foot left wo contrast    Result Date: 2/11/2021  Narrative: Exam: CT left foot without contrast INDICATION: Orthopedic implant, pseudoarthrosis TECHNIQUE: Routine CT left  foot without contrast. Sagittal coronal reconstructions were obtained. This exam was performed according to our departmental dose-optimization program, which includes automated exposure control, adjustment of the mA and/or kV according to patient size and/or use of iterative reconstruction technique. COMPARISON: 2/2/2021, CT 6/5/2020 FINDINGS: The previously screws from the prior CT have been removed and four screws have been placed along across the talocalcaneal and talonavicular joints. The medial anterior screw extends from the medial navicular bone to the posterior lateral talus. The lateral anterior screw extends from the lateral navicular bone to the posterior lateral talus. The anterior ventral loss screw enters the mid calcaneus extends to the talar dome. The posterior ventral screw enters the posterior inferior margin of the calcaneus extends obliquely across the talocalcaneal joint with the tip residing in the anterior aspect of the talar dome. There is at least two bony struts seen medial and lateral to the ventral screws at the talocalcaneal joint. There is less lucency seen at the pseudarthrosis compared to previous CT study. There are progressive arthritic changes at the talonavicular tibial joint. There is a fracture/mild fragmentation at the talar dome. There is no significant lucency seen along the margins of the four screws. There is also of slightly more fragmentation seen within the posterior calcaneus compared to the previous study. No obvious acute fracture or dislocation.     Impression: 1. Postsurgical changes compatible with surgical arthrodesis at the talocalcaneal joint and talonavicular joint. 2. Decreased lucency at the talocalcaneal arthrodesis site compared to previous CT exam. 2. There is fracture/fragmentation noted along the talar dome with a progressive arthritic change at the talotibial joint compared to the previous exam. Recommendation clinical findings and follow-up as  clinically warranted. Electronically signed by:  Jesus Lord MD  2/11/2021 10:18 AM [facility] Workstation: [id number]    Peripheral Block    Result Date: 2/26/2021  Narrative: Abhi Rohan ERWIN, ELLEN     2/26/2021  1:03 PM Peripheral Block Patient location during procedure: OR Start time: 2/26/2021 8:55 AM Stop time: 2/26/2021 9:02 AM Reason for block: procedure for pain, at surgeon's request, post-op pain management and secondary anesthetic Performed by Anesthesiologist: Renetta Yost DO Preanesthetic Checklist Completed: patient identified, site marked, surgical consent, pre-op evaluation, timeout performed, IV checked, risks and benefits discussed and monitors and equipment checked Prep: Pt Position: supine Sterile barriers:cap, gloves, sterile barriers and mask Prep: ChloraPrep Patient monitoring: blood pressure monitoring, continuous pulse oximetry and EKG Procedure Sedation:yes Performed under: local infiltration Guidance:ultrasound guided ULTRASOUND INTERPRETATION. Using ultrasound guidance a 21 G gauge needle was placed in close proximity to the nerve, at which point, under ultrasound guidance anesthetic was injected in the area of the nerve and spread of the anesthesia was seen on ultrasound in close proximity thereto.  There were no abnormalities seen on ultrasound; a digital image was taken; and the patient tolerated the procedure with no complications. Images:still images obtained, printed/placed on chart Laterality:left Block Type:sciatic Injection Technique:single-shot Needle Type:echogenic Needle Gauge:21 G Resistance on Injection: none Catheter Size:20 G Cath Depth at skin: 5 cm Medications Used: ropivacaine (NAROPIN) 0.5 % injection, 30 mL Post Assessment Injection Assessment: negative aspiration for heme, no paresthesia on injection and incremental injection Patient Tolerance:comfortable throughout block Complications:no Additional Notes U/S used throughout and needle seen throughout No  complications during procedure     US Venous Doppler Lower Extremity Left (duplex)    Result Date: 3/9/2021  Narrative: Doppler duplex venous examination left lower extremity. CLINICAL INDICATION: Left leg pain.    COMPARISON: None FINDINGS: The common femoral,  femoral, and popliteal veins of the left    lower extremity are well identified and compress normally. Doppler signals are heard either spontaneously or on distal compression.  No evidence of intraluminal thrombus was noted. The visualized posterior calf veins are normal.     Impression: No evidence of deep venous thrombosis in the common femoral, femoral, or popliteal veins of the left     lower extremity. Electronically signed by:  Naveed Taylor MD  3/9/2021 5:49 PM CST Workstation: [id number]    US Venous Doppler Lower Extremity Left (duplex)    Result Date: 2/20/2021  Narrative: Ultrasound venous duplex left lower extremity HISTORY: Left lower extremity pain Duplex ultrasound of the deep venous system of the left lower extremity was performed FINDINGS: Real-time images demonstrate normal compressibility without evidence of intraluminal thrombus. Doppler shows phasic flow and augmentation. Color Doppler also reveals venous patency. Fatty replaced left inguinal lymph node.     Impression: CONCLUSION: No ultrasound evidence of deep venous thrombosis of the left lower extremity. [id number] Electronically signed by:  David Gamboa MD  2/20/2021 2:24 PM CST Workstation: [id number]    CT Lower Extremity Left Without Contrast    Result Date: 2/20/2021  Narrative: CT left foot without contrast History: Postoperative foot pain Axial scans of the left foot were obtained without intravenous contrast.  Coronal and sagital reconstructions were preformed. This exam was performed according to our departmental dose-optimization program, which includes automated exposure control, adjustment of the mA and/or kV according to patient size and/or use of iterative reconstruction  technique. DLP: 233.10 Comparison: February 10, 2021 Findings: Increased dorsal soft tissue swelling. Previously demonstrated tibiotalar joint effusion. Again arthrodesis of the talocalcaneal joint and talonavicular joint with four screws in place. No solid bony fusion. Again there is a fragmentation and lucency. Bone graft remains in place. Previously demonstrated fracture or fragmentation of the talar dome. No new area of bone destruction or periosteal reaction. Disuse osteoporosis. Stable exostosis first metatarsal.     Impression: CONCLUSION: Increased dorsal soft tissue swelling. Otherwise no significant change. [id number] Electronically signed by:  David Gamboa MD  2/20/2021 2:02 PM CST Workstation: [id number]    Patient admitted to  Resident.                                     [facility]    Final diagnoses:   Cellulitis of left lower extremity   Sepsis, due to unspecified organism, unspecified whether acute organ dysfunction present (CMS/[facility])            Ben Porter PA-C  03/09/21 2049      Electronically signed by True Crocker MD at 03/09/21 2151     Thea Yu RN at 03/09/21 2132        Report given       Yu, Thea, RN  03/09/21 2132      Electronically signed by Thea Yu RN at 03/09/21 2132         Lab Results (last 24 hours)     Procedure Component Value Units Date/Time    Blood Culture ID, PCR - Blood, Blood, Venous Line [[id number]] Collected: 03/09/21 1931    Specimen: Blood, Venous Line Updated: 03/10/21 1235    POC Glucose Once [[id number]]  (Abnormal) Collected: 03/10/21 1040    Specimen: Blood Updated: 03/10/21 1103     Glucose 281 mg/dL      Comment: RN NotifiedOperator: [id number] GALINDO CRYSTALMeter ID: [id number]       POC Glucose Once [[id number]]  (Abnormal) Collected: 03/10/21 0608    Specimen: Blood Updated: 03/10/21 0642     Glucose 301 mg/dL      Comment: RN NotifiedOperator: [id number] PEGGY SANDRAMeter ID: [id number]       Procalcitonin [[id number]]  (Abnormal)  "Collected: 03/09/21 1839    Specimen: Blood Updated: 03/10/21 0022     Procalcitonin 0.53 ng/mL     Narrative:      As a Marker for Sepsis (Non-Neonates):   1. <0.5 ng/mL represents a low risk of severe sepsis and/or septic shock.  1. >2 ng/mL represents a high risk of severe sepsis and/or septic shock.    As a Marker for Lower Respiratory Tract Infections that require antibiotic therapy:  PCT on Admission     Antibiotic Therapy             6-12 Hrs later  > 0.5                Strongly Recommended            >0.25 - <0.5         Recommended  0.1 - 0.25           Discouraged                   Remeasure/reassess PCT  <0.1                 Strongly Discouraged          Remeasure/reassess PCT      As 28 day mortality risk marker: \"Change in Procalcitonin Result\" (> 80 % or <=80 %) if Day 0 (or Day 1) and Day 4 values are available. Refer to http://www.Pulse Entertainmentpct-calculator.com/   Change in PCT <=80 %   A decrease of PCT levels below or equal to 80 % defines a positive change in PCT test result representing a higher risk for 28-day all-cause mortality of patients diagnosed with severe sepsis or septic shock.  Change in PCT > 80 %   A decrease of PCT levels of more than 80 % defines a negative change in PCT result representing a lower risk for 28-day all-cause mortality of patients diagnosed with severe sepsis or septic shock.                Results may be falsely decreased if patient taking Biotin.     POC Glucose Once [[id number]]  (Abnormal) Collected: 03/09/21 2225    Specimen: Blood Updated: 03/09/21 2237     Glucose 295 mg/dL      Comment: RN NotifiedOperator: [id number] PEGGY Garcia ID: [id number]       Extra Tubes [[id number]] Collected: 03/09/21 1938    Specimen: Blood, Venous Line Updated: 03/09/21 2045    Narrative:      The following orders were created for panel order Extra Tubes.  Procedure                               Abnormality         Status                     ---------                             "   -----------         ------                     Gold Top - SST[[id number]]                                   Final result                 Please view results for these tests on the individual orders.    Gold Top - SST [[id number]] Collected: 03/09/21 1938    Specimen: Blood Updated: 03/09/21 2045     Extra Tube Hold for add-ons.     Comment: Auto resulted.       Extra Tubes [[id number]] Collected: 03/09/21 1912    Specimen: Blood, Venous Line Updated: 03/09/21 2015    Narrative:      The following orders were created for panel order Extra Tubes.  Procedure                               Abnormality         Status                     ---------                               -----------         ------                     Light Blue Top[[id number]]                                   Final result                 Please view results for these tests on the individual orders.    Light Blue Top [[id number]] Collected: 03/09/21 1912    Specimen: Blood Updated: 03/09/21 2015     Extra Tube hold for add-on     Comment: Auto resulted       COVID-19 and FLU A/B PCR - Swab, Nasopharynx [[id number]]  (Normal) Collected: 03/09/21 1922    Specimen: Swab from Nasopharynx Updated: 03/09/21 2003     COVID19 Not Detected     Influenza A PCR Not Detected     Influenza B PCR Not Detected    Narrative:      Fact sheet for providers: https://www.fda.gov/media/943332/download    Fact sheet for patients: https://www.fda.gov/media/041660/download    Test performed by PCR.    Lactic Acid, Plasma [[id number]]  (Abnormal) Collected: 03/09/21 1931    Specimen: Blood Updated: 03/09/21 2000     Lactate 2.1 mmol/L     Blood Culture - Blood, Blood, Venous Line [[id number]] Collected: 03/09/21 1931    Specimen: Blood, Venous Line Updated: 03/09/21 1942    Blood Culture - Blood, Blood, Venous Line [[id number]] Collected: 03/09/21 1931    Specimen: Blood, Venous Line Updated: 03/09/21 1939    Comprehensive Metabolic Panel [[id number]]  (Abnormal) Collected: 03/09/21  1839    Specimen: Blood Updated: 03/09/21 1909     Glucose 290 mg/dL      BUN 12 mg/dL      Creatinine 1.09 mg/dL      Sodium 133 mmol/L      Potassium 4.9 mmol/L      Comment: Slight hemolysis detected by analyzer. Results may be affected.        Chloride 95 mmol/L      CO2 25.0 mmol/L      Calcium 8.7 mg/dL      Total Protein 6.9 g/dL      Albumin 3.20 g/dL      ALT (SGPT) 28 U/L      AST (SGOT) 27 U/L      Alkaline Phosphatase 138 U/L      Total Bilirubin 1.2 mg/dL      eGFR Non African Amer 51 mL/min/1.73      Globulin 3.7 gm/dL      A/G Ratio 0.9 g/dL      BUN/Creatinine Ratio 11.0     Anion Gap 13.0 mmol/L     Narrative:      GFR Normal >60  Chronic Kidney Disease <60  Kidney Failure <15      Troponin [[id number]]  (Normal) Collected: 03/09/21 1839    Specimen: Blood Updated: 03/09/21 1904     Troponin T <0.010 ng/mL     Narrative:      Troponin T Reference Range:  <= 0.03 ng/mL-   Negative for AMI  >0.03 ng/mL-     Abnormal for myocardial necrosis.  Clinicians would have to utilize clinical acumen, EKG, Troponin and serial changes to determine if it is an Acute Myocardial Infarction or myocardial injury due to an underlying chronic condition.       Results may be falsely decreased if patient taking Biotin.      BNP [[id number]]  (Normal) Collected: 03/09/21 1839    Specimen: Blood Updated: 03/09/21 1903     proBNP 714.7 pg/mL     Narrative:      Among patients with dyspnea, NT-proBNP is highly sensitive for the detection of acute congestive heart failure. In addition NT-proBNP of <300 pg/ml effectively rules out acute congestive heart failure with 99% negative predictive value.    Results may be falsely decreased if patient taking Biotin.      CBC & Differential [[id number]]  (Abnormal) Collected: 03/09/21 1839    Specimen: Blood Updated: 03/09/21 1850    Narrative:      The following orders were created for panel order CBC & Differential.  Procedure                               Abnormality         Status                      ---------                               -----------         ------                     CBC Auto Differential[[id number]]        Abnormal            Final result                 Please view results for these tests on the individual orders.    CBC Auto Differential [[id number]]  (Abnormal) Collected: 03/09/21 1839    Specimen: Blood Updated: 03/09/21 1850     WBC 22.39 10*3/mm3      RBC 3.98 10*6/mm3      Hemoglobin 12.1 g/dL      Hematocrit 35.6 %      MCV 89.4 fL      MCH 30.4 pg      MCHC 34.0 g/dL      RDW 13.2 %      RDW-SD 42.8 fl      MPV 9.3 fL      Platelets 432 10*3/mm3      Neutrophil % 81.7 %      Lymphocyte % 5.6 %      Monocyte % 11.1 %      Eosinophil % 0.2 %      Basophil % 0.3 %      Immature Grans % 1.1 %      Neutrophils, Absolute 18.28 10*3/mm3      Lymphocytes, Absolute 1.26 10*3/mm3      Monocytes, Absolute 2.49 10*3/mm3      Eosinophils, Absolute 0.05 10*3/mm3      Basophils, Absolute 0.07 10*3/mm3      Immature Grans, Absolute 0.24 10*3/mm3      nRBC 0.0 /100 WBC         Imaging Results (Last 24 Hours)     Procedure Component Value Units Date/Time    CT Angiogram Chest [[id number]] Collected: 03/09/21 2001     Updated: 03/09/21 2056    Narrative:      PROCEDURE: CT ANGIOGRAM CHEST      .        EXAM:  Computed Tomography with CTA         REGION:  Chest         INDICATION:   Chest pain, tachycardia, cellulitis of lower  extremity, sepsis  - rule out pulmonary embolism         CORRELATIVE IMAGING:  None                          TECHNIQUE:             - PE / vascular protocol               - reconstructions:  axial, coronal, sagittal, obliques     - computer-generated 3D reconstructions (MIPS) were performed.                - contrast:  intravenous 90 mL of Isovue-370                         This exam was performed according to our departmental  dose-optimization program, which includes automated exposure  control, adjustment of the mA and/or kV according to patient size  and/or use of  iterative reconstruction technique.  DLP is 713.0              COMMENTS:                               - Pulmonary arterial system:      - Main pulmonary artery trunk:  negative      - Left, right main pulmonary arteries: negative      - Lobar arteries: negative      - Segmental arteries: Limited evaluation of segmental  branches due to contrast phase       - Systemic vascularity (as visualized):        - Aorta: Normal caliber. Atherosclerotic vascular  calcification      - roots of great vessels: Normal caliber.      - SVC / IVC:  grossly negative / normal caliber         - Misc (limited visualization):      - pulmonary parenchyma:  10 year to band like opacities in  the lower lobes bilaterally, likely representing atelectasis or  scarring. Right hemidiaphragm is elevated      - pleura:  negative      - mediastinal / maria guadalupe:  negative      - neck, inferior:  grossly wnl      - subdiaphragmatic structures: The liver appears prominent.  There is pneumobilia, probably secondary to sphincterotomy. The  gallbladder is surgically absent  - osseous:  No acute abnormality identified            .      Impression:      1.  No evidence of pulmonary embolism, though there is limited  evaluation of segmental branches due to contrast phase.          2.  No evidence of acute pathology associated with the visualized  aorta.      3. Linear to bandlike opacities in the upper lung zones probably  represent atelectasis and/or scarring  4. Liver appears prominent, but is incompletely included in the  study.  5. Pneumobilia, probably secondary to cholecystectomy and  sphincterotomy.    Electronically signed by:  Janki Molina MD  3/9/2021 8:54 PM CST  Workstation: [id number]        ECG/EMG Results (last 24 hours)     Procedure Component Value Units Date/Time    ECG 12 Lead [[id number]] Collected: 03/09/21 1824     Updated: 03/09/21 2125     QT Interval 334 ms      QTC Interval 443 ms     Narrative:      Test Reason : chest pain  Blood  Pressure : **/** mmHG  Vent. Rate : 106 BPM     Atrial Rate : 106 BPM     P-R Int : 136 ms          QRS Dur : 082 ms      QT Int : 334 ms       P-R-T Axes : 057 045 036 degrees     QTc Int : 443 ms    Sinus tachycardia  Otherwise normal ECG  When compared with ECG of 2021 12:13,  No significant change was found  Confirmed by NICOLA COREAS, B. N. ([id number]) on 3/9/2021 9:25:17 PM    Referred By:             Confirmed By:LORE PETERSEN MD    SCANNED EKG [[id number]] Resulted: 21     Updated: 03/10/21 1226          Operative/Procedure Notes (last 24 hours) (Notes from 21 1244 through 03/10/21 124)    No notes of this type exist for this encounter.            Physician Progress Notes (last 24 hours) (Notes from 21 1244 through 03/10/21 1244)      Seb Troncoso MD at 03/10/21 0805     Attestation signed by Tirso Herrera MD at 03/10/21 1137    I have seen and evaluated the patient.  I have discussed the case with the resident. I have reviewed the notes, assessment and plan, and/or procedures performed by the resident. I concur with the resident’s documentation.    Physical Exam:  General: NAD.  CV: S1 and S2 normal. RRR.  Pulmonary: Clear to auscultation bilaterally, no wheezing, no rales.   Abdomen: Bowel sounds present and normal. Abdomen is soft, obese, and nontender   Extremities: dressing noted over the LLE     Plan: 61 y.o. female with a CMH of coronary artery disease, depression, hypertension, type 2 diabetes mellitus and a recent left ankle arthroscopy, exostectomy, hardware removal of the left foot on 2021 admitted for treatment of Cellulitis. Pt on iv antibiotics. Podiatry consulted and will await for their recs.                       FAMILY MEDICINE DAILY PROGRESS NOTE    NAME: Ariana Martinez  : 1960  MRN: [id number]      LOS: 1 day     PROVIDER OF SERVICE: Seb Troncoso MD    Chief Complaint: Sepsis (CMS/HCC)    Subjective:     Interval History:  History  taken from: patient chart  Patient Complaints: L foot pain  Admitted to floor. On vanc and zosyn. W/u negative for DVT or PE.     Review of Systems:   Review of Systems   Constitutional: Positive for fever. Negative for chills.        Foot pain   HENT: Negative for congestion and sore throat.    Eyes: Negative for pain and redness.   Respiratory: Negative for cough and shortness of breath.    Cardiovascular: Negative for chest pain and palpitations.   Gastrointestinal: Negative for abdominal pain and blood in stool.   Endocrine: Negative for polydipsia and polyphagia.   Genitourinary: Positive for difficulty urinating. Negative for hematuria.   Musculoskeletal: Positive for arthralgias. Negative for neck pain.   Skin: Negative for wound.   Neurological: Negative for dizziness and headaches.   Psychiatric/Behavioral: Negative for hallucinations and sleep disturbance.       Objective:     Vital Signs  Temp:  [98.1 °F (36.7 °C)-102.6 °F (39.2 °C)] 102.6 °F (39.2 °C)  Heart Rate:  [] 112  Resp:  [18-20] 20  BP: (120-145)/(54-70) 142/54   Body mass index is 42.1 kg/m².    Physical Exam  Physical Exam  Vitals and nursing note reviewed.   Constitutional:       General: She is not in acute distress.     Appearance: She is well-developed.   HENT:      Head: Normocephalic and atraumatic.   Eyes:      Conjunctiva/sclera: Conjunctivae normal.   Neck:      Vascular: No JVD.   Cardiovascular:      Rate and Rhythm: Normal rate and regular rhythm.      Heart sounds: Normal heart sounds. No murmur. No friction rub. No gallop.    Pulmonary:      Effort: Pulmonary effort is normal.      Breath sounds: Normal breath sounds. No wheezing or rales.   Abdominal:      General: Bowel sounds are normal. There is no distension.      Palpations: Abdomen is soft.      Tenderness: There is no abdominal tenderness.   Musculoskeletal:      Comments: Wrapping intact around left lower extremity/foot.   Skin:     General: Skin is warm and dry.         Capillary Refill: Capillary refill takes less than 2 seconds.   Neurological:      Mental Status: She is alert and oriented to person, place, and time.          Scheduled Meds   ARIPiprazole, 15 mg, Oral, Daily  aspirin, 81 mg, Oral, Daily  atorvastatin, 80 mg, Oral, Daily  clopidogrel, 75 mg, Oral, Daily  furosemide, 40 mg, Oral, Daily  gabapentin, 400 mg, Oral, TID  heparin (porcine), 5,000 Units, Subcutaneous, Q12H  insulin aspart, 0-7 Units, Subcutaneous, TID AC  insulin detemir, 60 Units, Subcutaneous, Nightly  metoprolol succinate XL, 25 mg, Oral, Daily  mirtazapine, 45 mg, Oral, Nightly  pantoprazole, 20 mg, Oral, Daily  piperacillin-tazobactam, 3.375 g, Intravenous, Q6H  sodium chloride, 10 mL, Intravenous, Q12H  sodium chloride 0.9% - IBW for BMI > 30, 30 mL/kg (Ideal), Intravenous, Once  venlafaxine XR, 150 mg, Oral, Daily With Breakfast       PRN Meds   •  acetaminophen  •  bisacodyl  •  dextrose  •  dextrose  •  glucagon (human recombinant)  •  HYDROcodone-acetaminophen  •  LORazepam  •  meclizine  •  melatonin  •  metoclopramide  •  ondansetron  •  ondansetron  •  [COMPLETED] Insert peripheral IV **AND** sodium chloride  •  sodium chloride  •  traMADol      Diagnostic Data    Results from last 7 days   Lab Units 03/09/21  1839   WBC 10*3/mm3 22.39*   HEMOGLOBIN g/dL 12.1   HEMATOCRIT % 35.6   PLATELETS 10*3/mm3 432   GLUCOSE mg/dL 290*   CREATININE mg/dL 1.09*   BUN mg/dL 12   SODIUM mmol/L 133*   POTASSIUM mmol/L 4.9   AST (SGOT) U/L 27   ALT (SGPT) U/L 28   ALK PHOS U/L 138*   BILIRUBIN mg/dL 1.2   ANION GAP mmol/L 13.0       US Venous Doppler Lower Extremity Left (duplex)    Result Date: 3/9/2021  No evidence of deep venous thrombosis in the common femoral, femoral, or popliteal veins of the left     lower extremity. Electronically signed by:  Naveed Taylor MD  3/9/2021 5:49 PM CST Workstation: [id number]    CT Angiogram Chest    Result Date: 3/9/2021  1.  No evidence of pulmonary embolism, though  there is limited evaluation of segmental branches due to contrast phase.        2.  No evidence of acute pathology associated with the visualized aorta.    3. Linear to bandlike opacities in the upper lung zones probably represent atelectasis and/or scarring 4. Liver appears prominent, but is incompletely included in the study. 5. Pneumobilia, probably secondary to cholecystectomy and sphincterotomy. Electronically signed by:  Janki Molina MD  3/9/2021 8:54 PM CST Workstation: [id number]        I reviewed the patient's new clinical results.    Assessment/Plan:     Active Hospital Problems    Diagnosis  POA   • **Sepsis (CMS/HCC) [A41.9]  Unknown     Priority: High     Sepsis secondary to left lower extremity cellulitis. Given 1 L bolus in the ED. Procal elevated.   -Nitin and Zosyn  -Urinalysis and blood culture pending       • Cellulitis of left lower extremity [L03.116]  Yes     Priority: High     CTA chest shows no evidence of pulmonary embolism per radiologist interpretation. Duplex venous ultrasound showed no evidence of DVT per radiology interpretation  -Vanc and Zosyn  -Dr. 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, with long-term current use of insulin (CMS/HCC) [E11.49, E11.65, Z79.4]  Not Applicable     -Continue 60 units Levemir nightly  -Sliding scale insulin for additional coverage         DVT prophylaxis: Heparin  Code status is   Code Status and Medical Interventions:   Ordered at: 03/09/21 7310      Code Status:    CPR     Medical Interventions (Level of Support Prior to Arrest):    Full       Plan for disposition:Where: home and When:  tomorrow      Time: 12 min     This document has been electronically signed by Seb Troncoso MD on March 10, 2021 08:11 CST        Electronically signed by Tirso Herrera MD at 03/10/21 1137       Consult Notes (last 24 hours) (Notes from 03/09/21 1244 through 03/10/21 1244)    No notes of this type exist for this encounter.

## 2021-03-10 NOTE — CONSULTS
[facility]   Consult Note    Patient Name: Ariana Martinez  : 1960  MRN: [id number]  Primary Care Physician: Kimberly Ferguson APRN  Referring Physician: True Crocker MD  Date of admission: 3/9/2021    Foot / Ankle (on-call MD unless specified)  Consult performed by: Ignacio Lord DPM  Consult ordered by: Kiara Whitt MD  Reason for consult: Left ankle cellulitis        Subjective   Subjective     Reason for Consult/ Chief Complaint: Left ankle pain, cellulitis    Ariana Martinez is a 61 y.o. female with history of diabetes and complicated history of left ankle surgery.  Patient previously underwent subtalar joint and talonavicular joint arthrodesis with delayed union of the subtalar joint.  Patient did have hardware loosening which created injury to the talar dome.  Approximately 2 weeks ago she underwent partial hardware removal and ankle exostectomy.  She did progressively note increased swelling redness and calf pain.  She was sent to the emergency department yesterday after having a D-dimer of 4000 in the clinic setting.  Her venous ultrasound was negative for DVT but she did display a white count of 22,000.  She is admitted for cellulitis.    Leg Swelling  This is a new problem. The current episode started 1 to 4 weeks ago. The problem occurs constantly. The problem has been gradually worsening. Associated symptoms include arthralgias and chills. The symptoms are aggravated by walking and standing. She has tried nothing for the symptoms. The treatment provided no relief.       Review of Systems   Constitutional: Positive for chills.   HENT: Negative.    Respiratory: Negative.    Cardiovascular: Positive for leg swelling.   Musculoskeletal: Positive for arthralgias.   Skin: Positive for color change.   Neurological: Negative.    Psychiatric/Behavioral: Negative.         Personal History     Past Medical History:   Diagnosis Date   • Angina, class IV (CMS/HCC)    • Anxiety    •  Anxiety and depression    • Arthritis    • Benign paroxysmal positional vertigo    • Bladder disorder     has bladder stimulator   • Carpal tunnel syndrome    • CHF (congestive heart failure) (CMS/[facility])    • Chronic pain    • Coronary atherosclerosis     hx CABG 2005.  is followed by Dr Kwon   • Depression    • Diabetes mellitus (CMS/[facility])     Type 2, controlled   • Diabetic polyneuropathy (CMS/[facility])    • Disease of thyroid gland    • Elevated cholesterol    • Female stress incontinence    • Foot pain, left    • Full dentures    • Gastroparesis    • GERD (gastroesophageal reflux disease)    • Hyperlipidemia    • Hypertension    • Low back pain    • Malaise and fatigue    • Multiple joint pain    • Obesity     Refuses to be weighed   • Otalgia     Both   • Perforation of tympanic membrane     Left   • Postoperative wound infection    • Vitamin D deficiency    • Wears glasses     reading       Past Surgical History:   Procedure Laterality Date   • ABDOMINAL SURGERY     • ANGIOPLASTY      coronary   • ANKLE ARTHROSCOPY Left 2/26/2021    Procedure: Left foot hardware removal, ankle arthroscopy, bone grafting , left foot exostectomy;  Surgeon: Ignacio Lord DPM;  Location: [facility] OR;  Service: Podiatry;  Laterality: Left;   • BREAST BIOPSY Right    • CARDIAC CATHETERIZATION     • CARDIAC CATHETERIZATION N/A 6/20/2017    Procedure: Right Heart Cath;  Surgeon: Can Kwon MD PhD;  Location: [facility] CATH INVASIVE LOCATION;  Service:    • CARDIAC CATHETERIZATION N/A 2/18/2020    Procedure: Left Heart Cath;  Surgeon: Catalina Cooper MD;  Location: [facility] CATH INVASIVE LOCATION;  Service: Cardiology;  Laterality: N/A;   • CARPAL TUNNEL RELEASE     • CHOLECYSTECTOMY     • COLONOSCOPY N/A 6/24/2020    Procedure: COLONOSCOPY;  Surgeon: Julián Maldonado MD;  Location: [facility] ENDOSCOPY;  Service: Gastroenterology;  Laterality: N/A;   • CORONARY ARTERY BYPASS GRAFT  2005   • ENDOSCOPY N/A 10/19/2018     Procedure: ESOPHAGOGASTRODUODENOSCOPY possible dilation;  Surgeon: Julián Maldonado MD;  Location: [facility] ENDOSCOPY;  Service: Gastroenterology   • ENDOSCOPY N/A 6/24/2020    Procedure: ESOPHAGOGASTRODUODENOSCOPY WED appt please;  Surgeon: Julián Maldonado MD;  Location: [facility] ENDOSCOPY;  Service: Gastroenterology;  Laterality: N/A;   • ENDOSCOPY AND COLONOSCOPY     • FOOT SURGERY      Toes   • FOOT SURGERY     • GASTRIC BANDING      Revision, laparoscopic   • HYSTERECTOMY     • MOUTH SURGERY     • SALPINGO OOPHORECTOMY     • SHOULDER SURGERY     • SUBTALAR ARTHRODESIS Left 1/16/2019    Procedure: LEFT FOOT HARDWARE REMOVAL, FIFTH METATARSAL , OPEN REDUCTION INTERNAL FIXATION, CALCANEAL OSTEOTOMY;  Surgeon: Ignacio Lord DPM;  Location: [facility] OR;  Service: Podiatry   • SUBTALAR ARTHRODESIS Left 10/16/2019    Procedure: foot hardware removal, subtalar joint fusion  possible de/reattachment of achilles tendon        (c-arm);  Surgeon: Ignacio Lord DPM;  Location: [facility] OR;  Service: Podiatry   • SUBTALAR ARTHRODESIS Left 9/30/2020    Procedure: subtalar, talonavicular joint arthrodesis.  Removal hardware.          (c-arm);  Surgeon: Ignacio Lord DPM;  Location: [facility] OR;  Service: Podiatry;  Laterality: Left;   • TRANSESOPHAGEAL ECHOCARDIOGRAM (LAMONTE)      With color flow       Family History: family history includes Diabetes in her sister, sister, sister, sister, sister, sister and another family member; Heart disease in her mother, sister, sister, and another family member; Hypertension in her mother, sister, sister, and another family member; Stroke in her mother. Otherwise pertinent FHx was reviewed and not pertinent to current issue.    Social History:  reports that she has never smoked. She has never used smokeless tobacco. She reports that she does not drink alcohol and does not use drugs.    Home Medications:  ARIPiprazole, [employer], Calcium Citrate-Vitamin D, Glucagon  Emergency, Insulin Glargine, Insulin Pen Needle, LORazepam, ONE TOUCH ULTRA MINI, Syringe/Needle (Disp), aspirin, atorvastatin, clopidogrel, cyanocobalamin, doxycycline, folic acid, furosemide, gabapentin, glucose blood, glucose monitor, hydroCHLOROthiazide, insulin aspart, meclizine, metoclopramide, metoprolol succinate XL, mirtazapine, ondansetron, pantoprazole, traMADol, venlafaxine XR, and vitamin D      Allergies:  Allergies   Allergen Reactions   • Seroquel [Quetiapine] Anaphylaxis   • Avandia [Rosiglitazone] Swelling   • Morphine And Related Hallucinations   • Oxycodone Hallucinations       Objective    Objective   Vitals:  Temp:  [98.1 °F (36.7 °C)-102.7 °F (39.3 °C)] 101.1 °F (38.4 °C)  Heart Rate:  [100-112] 112  Resp:  [18-20] 20  BP: (127-145)/(54-66) 142/54    Physical Exam  Constitutional:       Appearance: She is obese.   HENT:      Head: Normocephalic and atraumatic.   Eyes:      General: Scleral icterus present.   Cardiovascular:      Rate and Rhythm: Normal rate and regular rhythm.      Pulses:           Dorsalis pedis pulses are 1+ on the left side.        Posterior tibial pulses are 1+ on the left side.   Pulmonary:      Effort: Pulmonary effort is normal.      Breath sounds: Normal breath sounds.   Musculoskeletal:      Left lower leg: Edema present.      Left foot: Decreased range of motion.        Feet:    Feet:      Right foot:      Protective Sensation: 0 sites sensed.      Left foot:      Protective Sensation: 0 sites sensed.      Skin integrity: Erythema present.   Neurological:      Mental Status: She is alert.   Psychiatric:         Mood and Affect: Mood normal.         Behavior: Behavior normal.         Result Review    Result Review:  I have personally reviewed the results from the time of this admission to 3/10/2021 16:00 CST and agree with these findings:  [x]  Laboratory  [x]  Microbiology  []  Radiology  []  EKG/Telemetry   []  Cardiology/Vascular   []  Pathology  []  Old  records  []  Other:      Assessment/Plan   Assessment / Plan     Brief Patient Summary:      Active Hospital Problems:  Active Hospital Problems    Diagnosis    • **Sepsis due to methicillin susceptible Staphylococcus aureus (MSSA) with acute renal failure without septic shock (CMS/HCC)      Sepsis secondary to left lower extremity cellulitis. Given 1 L bolus in the ED. Procal elevated.   -Vanc and Zosyn  -Urinalysis and blood culture pending       • Cellulitis of left lower extremity      CTA chest shows no evidence of pulmonary embolism per radiologist interpretation. Duplex venous ultrasound showed no evidence of DVT per radiology interpretation  -Vanc and Zosyn  -Dr. Lord (podiatry) on board will follow patient     • CAD (coronary artery disease)      -Continue metoprolol and Plavix     • Hypertension      -Continue metoprolol, hydrochlorothiazide, Lasix     • CHON (acute kidney injury) (CMS/HCC)      -Baseline creatinine 0.79  -Creatinine today is 1.09 with a GFR of 51  -IV fluids     • (HFpEF) heart failure with preserved ejection fraction (CMS/[facility])      -Continue Lasix, metoprolol, atorvastatin  -Consider adding ACE/ARB once sepsis resolves     • Depression, major, recurrent, moderate (CMS/HCC)      Continue home Effexor, Remeron, Ativan, Abilify     • 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  Patient does have significant erythema, cellulitis predominantly to medial and posterior heel.  Previous culture from posterior heel growing staph and gram-negative bacilli.  Continue vancomycin and Zosyn for now.  Patient does also have a urine culture pending.  Will repeat left ankle CT to help rule out deeper abscess.  Continue to trend labs and consider removal of remaining hardware, irrigation debridement  pending these results.  Will continue to follow    Electronically signed by Ignacio Lord DPM, 03/10/21, 4:00 PM CST.

## 2021-03-11 ENCOUNTER — APPOINTMENT (OUTPATIENT)
Dept: CARDIOLOGY | Facility: HOSPITAL | Age: 61
End: 2021-03-11

## 2021-03-11 ENCOUNTER — ANESTHESIA EVENT (OUTPATIENT)
Dept: PERIOP | Facility: HOSPITAL | Age: 61
End: 2021-03-11

## 2021-03-11 PROBLEM — N17.9 AKI (ACUTE KIDNEY INJURY): Status: RESOLVED | Noted: 2017-08-27 | Resolved: 2021-03-11

## 2021-03-11 PROBLEM — R33.9 URINARY RETENTION: Status: ACTIVE | Noted: 2021-03-11

## 2021-03-11 LAB
ALBUMIN SERPL-MCNC: 2.7 G/DL (ref 3.5–5.2)
ALBUMIN/GLOB SERPL: 0.9 G/DL
ALP SERPL-CCNC: 114 U/L (ref 39–117)
ALT SERPL W P-5'-P-CCNC: 13 U/L (ref 1–33)
ANION GAP SERPL CALCULATED.3IONS-SCNC: 8 MMOL/L (ref 5–15)
AST SERPL-CCNC: 11 U/L (ref 1–32)
BACTERIA SPEC AEROBE CULT: ABNORMAL
BACTERIA SPEC AEROBE CULT: ABNORMAL
BASOPHILS # BLD AUTO: 0.05 10*3/MM3 (ref 0–0.2)
BASOPHILS NFR BLD AUTO: 0.4 % (ref 0–1.5)
BILIRUB SERPL-MCNC: 0.6 MG/DL (ref 0–1.2)
BUN SERPL-MCNC: 13 MG/DL (ref 6–20)
BUN/CREAT SERPL: 12.5 (ref 7–25)
CALCIUM SPEC-SCNC: 8.5 MG/DL (ref 8.6–10.5)
CHLORIDE SERPL-SCNC: 100 MMOL/L (ref 98–107)
CO2 SERPL-SCNC: 24 MMOL/L (ref 22–29)
CREAT SERPL-MCNC: 1.04 MG/DL (ref 0.57–1)
D-LACTATE SERPL-SCNC: 1.3 MMOL/L (ref 0.5–2)
DEPRECATED RDW RBC AUTO: 44.7 FL (ref 37–54)
EOSINOPHIL # BLD AUTO: 0.26 10*3/MM3 (ref 0–0.4)
EOSINOPHIL NFR BLD AUTO: 1.9 % (ref 0.3–6.2)
ERYTHROCYTE [DISTWIDTH] IN BLOOD BY AUTOMATED COUNT: 13.3 % (ref 12.3–15.4)
GFR SERPL CREATININE-BSD FRML MDRD: 54 ML/MIN/1.73
GLOBULIN UR ELPH-MCNC: 3.1 GM/DL
GLUCOSE BLDC GLUCOMTR-MCNC: 134 MG/DL (ref 70–130)
GLUCOSE BLDC GLUCOMTR-MCNC: 206 MG/DL (ref 70–130)
GLUCOSE BLDC GLUCOMTR-MCNC: 235 MG/DL (ref 70–130)
GLUCOSE BLDC GLUCOMTR-MCNC: 241 MG/DL (ref 70–130)
GLUCOSE SERPL-MCNC: 237 MG/DL (ref 65–99)
GRAM STN SPEC: ABNORMAL
GRAM STN SPEC: ABNORMAL
HCT VFR BLD AUTO: 30.6 % (ref 34–46.6)
HGB BLD-MCNC: 10.2 G/DL (ref 12–15.9)
IMM GRANULOCYTES # BLD AUTO: 0.09 10*3/MM3 (ref 0–0.05)
IMM GRANULOCYTES NFR BLD AUTO: 0.7 % (ref 0–0.5)
LYMPHOCYTES # BLD AUTO: 0.85 10*3/MM3 (ref 0.7–3.1)
LYMPHOCYTES NFR BLD AUTO: 6.2 % (ref 19.6–45.3)
MCH RBC QN AUTO: 30.4 PG (ref 26.6–33)
MCHC RBC AUTO-ENTMCNC: 33.3 G/DL (ref 31.5–35.7)
MCV RBC AUTO: 91.1 FL (ref 79–97)
MONOCYTES # BLD AUTO: 1.06 10*3/MM3 (ref 0.1–0.9)
MONOCYTES NFR BLD AUTO: 7.7 % (ref 5–12)
NEUTROPHILS NFR BLD AUTO: 11.43 10*3/MM3 (ref 1.7–7)
NEUTROPHILS NFR BLD AUTO: 83.1 % (ref 42.7–76)
NRBC BLD AUTO-RTO: 0 /100 WBC (ref 0–0.2)
PLATELET # BLD AUTO: 354 10*3/MM3 (ref 140–450)
PMV BLD AUTO: 9.6 FL (ref 6–12)
POTASSIUM SERPL-SCNC: 3.5 MMOL/L (ref 3.5–5.2)
PROCALCITONIN SERPL-MCNC: 1.28 NG/ML (ref 0–0.25)
PROT SERPL-MCNC: 5.8 G/DL (ref 6–8.5)
RBC # BLD AUTO: 3.36 10*6/MM3 (ref 3.77–5.28)
SODIUM SERPL-SCNC: 132 MMOL/L (ref 136–145)
VANCOMYCIN PEAK SERPL-MCNC: 29.9 MCG/ML (ref 20–40)
VANCOMYCIN PEAK SERPL-MCNC: 42.5 MCG/ML (ref 20–40)
VANCOMYCIN TROUGH SERPL-MCNC: 18.4 MCG/ML (ref 5–20)
WBC # BLD AUTO: 13.74 10*3/MM3 (ref 3.4–10.8)

## 2021-03-11 PROCEDURE — 63710000001 INSULIN ASPART PER 5 UNITS: Performed by: STUDENT IN AN ORGANIZED HEALTH CARE EDUCATION/TRAINING PROGRAM

## 2021-03-11 PROCEDURE — 80053 COMPREHEN METABOLIC PANEL: CPT | Performed by: STUDENT IN AN ORGANIZED HEALTH CARE EDUCATION/TRAINING PROGRAM

## 2021-03-11 PROCEDURE — 63710000001 INSULIN DETEMIR PER 5 UNITS: Performed by: STUDENT IN AN ORGANIZED HEALTH CARE EDUCATION/TRAINING PROGRAM

## 2021-03-11 PROCEDURE — 93306 TTE W/DOPPLER COMPLETE: CPT | Performed by: INTERNAL MEDICINE

## 2021-03-11 PROCEDURE — 93306 TTE W/DOPPLER COMPLETE: CPT

## 2021-03-11 PROCEDURE — 83605 ASSAY OF LACTIC ACID: CPT | Performed by: STUDENT IN AN ORGANIZED HEALTH CARE EDUCATION/TRAINING PROGRAM

## 2021-03-11 PROCEDURE — 99231 SBSQ HOSP IP/OBS SF/LOW 25: CPT | Performed by: STUDENT IN AN ORGANIZED HEALTH CARE EDUCATION/TRAINING PROGRAM

## 2021-03-11 PROCEDURE — 25010000002 PERFLUTREN (DEFINITY) 8.476 MG IN SODIUM CHLORIDE (PF) 0.9 % 10 ML INJECTION: Performed by: STUDENT IN AN ORGANIZED HEALTH CARE EDUCATION/TRAINING PROGRAM

## 2021-03-11 PROCEDURE — 99024 POSTOP FOLLOW-UP VISIT: CPT | Performed by: PODIATRIST

## 2021-03-11 PROCEDURE — 84145 PROCALCITONIN (PCT): CPT | Performed by: STUDENT IN AN ORGANIZED HEALTH CARE EDUCATION/TRAINING PROGRAM

## 2021-03-11 PROCEDURE — 80202 ASSAY OF VANCOMYCIN: CPT | Performed by: STUDENT IN AN ORGANIZED HEALTH CARE EDUCATION/TRAINING PROGRAM

## 2021-03-11 PROCEDURE — 97166 OT EVAL MOD COMPLEX 45 MIN: CPT

## 2021-03-11 PROCEDURE — 63710000001 ONDANSETRON PER 8 MG: Performed by: STUDENT IN AN ORGANIZED HEALTH CARE EDUCATION/TRAINING PROGRAM

## 2021-03-11 PROCEDURE — 85025 COMPLETE CBC W/AUTO DIFF WBC: CPT | Performed by: STUDENT IN AN ORGANIZED HEALTH CARE EDUCATION/TRAINING PROGRAM

## 2021-03-11 PROCEDURE — 82962 GLUCOSE BLOOD TEST: CPT

## 2021-03-11 PROCEDURE — 87040 BLOOD CULTURE FOR BACTERIA: CPT | Performed by: STUDENT IN AN ORGANIZED HEALTH CARE EDUCATION/TRAINING PROGRAM

## 2021-03-11 PROCEDURE — 25010000002 VANCOMYCIN 5 G RECONSTITUTED SOLUTION: Performed by: STUDENT IN AN ORGANIZED HEALTH CARE EDUCATION/TRAINING PROGRAM

## 2021-03-11 PROCEDURE — 25010000002 PIPERACILLIN SOD-TAZOBACTAM PER 1 G: Performed by: STUDENT IN AN ORGANIZED HEALTH CARE EDUCATION/TRAINING PROGRAM

## 2021-03-11 PROCEDURE — 25010000003 AMPICILLIN-SULBACTAM PER 1.5 G: Performed by: STUDENT IN AN ORGANIZED HEALTH CARE EDUCATION/TRAINING PROGRAM

## 2021-03-11 PROCEDURE — 25010000002 ENOXAPARIN PER 10 MG: Performed by: STUDENT IN AN ORGANIZED HEALTH CARE EDUCATION/TRAINING PROGRAM

## 2021-03-11 PROCEDURE — 97162 PT EVAL MOD COMPLEX 30 MIN: CPT

## 2021-03-11 RX ORDER — GUAIFENESIN/DEXTROMETHORPHAN 100-10MG/5
5 SYRUP ORAL EVERY 4 HOURS PRN
Status: DISCONTINUED | OUTPATIENT
Start: 2021-03-11 | End: 2021-03-14 | Stop reason: HOSPADM

## 2021-03-11 RX ADMIN — METOPROLOL SUCCINATE 25 MG: 25 TABLET, FILM COATED, EXTENDED RELEASE ORAL at 08:51

## 2021-03-11 RX ADMIN — SODIUM CHLORIDE 100 ML/HR: 9 INJECTION, SOLUTION INTRAVENOUS at 08:50

## 2021-03-11 RX ADMIN — VENLAFAXINE HYDROCHLORIDE 150 MG: 75 CAPSULE, EXTENDED RELEASE ORAL at 08:51

## 2021-03-11 RX ADMIN — VANCOMYCIN HYDROCHLORIDE 1500 MG: 5 INJECTION, POWDER, LYOPHILIZED, FOR SOLUTION INTRAVENOUS at 00:34

## 2021-03-11 RX ADMIN — ASPIRIN 81 MG: 81 TABLET, FILM COATED ORAL at 08:51

## 2021-03-11 RX ADMIN — ACETAMINOPHEN 650 MG: 325 TABLET, FILM COATED ORAL at 20:22

## 2021-03-11 RX ADMIN — SODIUM CHLORIDE, PRESERVATIVE FREE 10 ML: 5 INJECTION INTRAVENOUS at 20:23

## 2021-03-11 RX ADMIN — SODIUM CHLORIDE, PRESERVATIVE FREE 10 ML: 5 INJECTION INTRAVENOUS at 08:50

## 2021-03-11 RX ADMIN — INSULIN ASPART 3 UNITS: 100 INJECTION, SOLUTION INTRAVENOUS; SUBCUTANEOUS at 08:59

## 2021-03-11 RX ADMIN — ONDANSETRON HYDROCHLORIDE 8 MG: 4 TABLET, FILM COATED ORAL at 10:45

## 2021-03-11 RX ADMIN — PERFLUTREN 1.5 ML: 6.52 INJECTION, SUSPENSION INTRAVENOUS at 10:26

## 2021-03-11 RX ADMIN — FUROSEMIDE 40 MG: 40 TABLET ORAL at 08:51

## 2021-03-11 RX ADMIN — AMPICILLIN SODIUM AND SULBACTAM SODIUM 3 G: 2; 1 INJECTION, POWDER, FOR SOLUTION INTRAMUSCULAR; INTRAVENOUS at 22:58

## 2021-03-11 RX ADMIN — INSULIN DETEMIR 60 UNITS: 100 INJECTION, SOLUTION SUBCUTANEOUS at 20:21

## 2021-03-11 RX ADMIN — ARIPIPRAZOLE 15 MG: 15 TABLET ORAL at 08:51

## 2021-03-11 RX ADMIN — AMPICILLIN SODIUM AND SULBACTAM SODIUM 1.5 G: 1; .5 INJECTION, POWDER, FOR SOLUTION INTRAMUSCULAR; INTRAVENOUS at 11:39

## 2021-03-11 RX ADMIN — ONDANSETRON HYDROCHLORIDE 8 MG: 4 TABLET, FILM COATED ORAL at 00:38

## 2021-03-11 RX ADMIN — ENOXAPARIN SODIUM 40 MG: 40 INJECTION SUBCUTANEOUS at 11:40

## 2021-03-11 RX ADMIN — MIRTAZAPINE 45 MG: 15 TABLET, FILM COATED ORAL at 20:22

## 2021-03-11 RX ADMIN — GUAIFENESIN AND DEXTROMETHORPHAN 5 ML: 100; 10 SYRUP ORAL at 22:32

## 2021-03-11 RX ADMIN — HYDROCODONE BITARTRATE AND ACETAMINOPHEN 1 TABLET: 7.5; 325 TABLET ORAL at 14:24

## 2021-03-11 RX ADMIN — GABAPENTIN 400 MG: 400 CAPSULE ORAL at 17:10

## 2021-03-11 RX ADMIN — IBUPROFEN 500 MG: 400 TABLET ORAL at 03:09

## 2021-03-11 RX ADMIN — AMPICILLIN SODIUM AND SULBACTAM SODIUM 3 G: 2; 1 INJECTION, POWDER, FOR SOLUTION INTRAMUSCULAR; INTRAVENOUS at 17:25

## 2021-03-11 RX ADMIN — CLOPIDOGREL BISULFATE 75 MG: 75 TABLET ORAL at 08:51

## 2021-03-11 RX ADMIN — PANTOPRAZOLE SODIUM 20 MG: 20 TABLET, DELAYED RELEASE ORAL at 08:51

## 2021-03-11 RX ADMIN — SODIUM CHLORIDE 100 ML/HR: 9 INJECTION, SOLUTION INTRAVENOUS at 22:32

## 2021-03-11 RX ADMIN — GABAPENTIN 400 MG: 400 CAPSULE ORAL at 08:51

## 2021-03-11 RX ADMIN — ATORVASTATIN CALCIUM 80 MG: 40 TABLET, FILM COATED ORAL at 08:51

## 2021-03-11 RX ADMIN — INSULIN ASPART 3 UNITS: 100 INJECTION, SOLUTION INTRAVENOUS; SUBCUTANEOUS at 17:11

## 2021-03-11 RX ADMIN — VANCOMYCIN HYDROCHLORIDE 1500 MG: 5 INJECTION, POWDER, LYOPHILIZED, FOR SOLUTION INTRAVENOUS at 11:39

## 2021-03-11 RX ADMIN — PIPERACILLIN SODIUM AND TAZOBACTAM SODIUM 3.38 G: 3; .375 INJECTION, POWDER, LYOPHILIZED, FOR SOLUTION INTRAVENOUS at 05:49

## 2021-03-11 NOTE — PLAN OF CARE
Goal Outcome Evaluation:     Progress: declining  Outcome Summary: pt started to get a fever again last night. tylenol and ice pack applied. pt states that she is worried she will have to have her leg amputated and has been upset about this. provided emotional support. will continue to monitor.

## 2021-03-11 NOTE — PROGRESS NOTES
Podiatry/Surgery Progress Note    S:  Seen at bedside resting comfortably. NAD. Some continued left ankle pain.    O:  Vitals:    03/11/21 1531   BP: 131/60   Pulse: 90   Resp: 18   Temp: (!) 100.8 °F (38.2 °C)   SpO2: 96%       Physical Exam  Constitutional:       Appearance: She is obese.   HENT:      Head: Normocephalic and atraumatic.   Eyes:      General: Scleral icterus present.   Cardiovascular:      Rate and Rhythm: Normal rate and regular rhythm.      Pulses:           Dorsalis pedis pulses are 1+ on the left side.        Posterior tibial pulses are 1+ on the left side.   Pulmonary:      Effort: Pulmonary effort is normal.      Breath sounds: Normal breath sounds.   Musculoskeletal:      Left lower leg: Edema present.      Left foot: Decreased range of motion.        Feet:    Feet:      Right foot:      Protective Sensation: 0 sites sensed.      Left foot:      Protective Sensation: 0 sites sensed.      Skin integrity: Erythema present.   Neurological:      Mental Status: She is alert.   Psychiatric:         Mood and Affect: Mood normal.         Behavior: Behavior normal.              WBC   Date Value Ref Range Status   03/11/2021 13.74 (H) 3.40 - 10.80 10*3/mm3 Final     RBC   Date Value Ref Range Status   03/11/2021 3.36 (L) 3.77 - 5.28 10*6/mm3 Final     Hemoglobin   Date Value Ref Range Status   03/11/2021 10.2 (L) 12.0 - 15.9 g/dL Final     Hematocrit   Date Value Ref Range Status   03/11/2021 30.6 (L) 34.0 - 46.6 % Final     MCV   Date Value Ref Range Status   03/11/2021 91.1 79.0 - 97.0 fL Final     MCH   Date Value Ref Range Status   03/11/2021 30.4 26.6 - 33.0 pg Final     MCHC   Date Value Ref Range Status   03/11/2021 33.3 31.5 - 35.7 g/dL Final     RDW   Date Value Ref Range Status   03/11/2021 13.3 12.3 - 15.4 % Final     RDW-SD   Date Value Ref Range Status   03/11/2021 44.7 37.0 - 54.0 fl Final     MPV   Date Value Ref Range Status   03/11/2021 9.6 6.0 - 12.0 fL Final     Platelets   Date  Value Ref Range Status   03/11/2021 354 140 - 450 10*3/mm3 Final     Neutrophil %   Date Value Ref Range Status   03/11/2021 83.1 (H) 42.7 - 76.0 % Final     Lymphocyte %   Date Value Ref Range Status   03/11/2021 6.2 (L) 19.6 - 45.3 % Final     Monocyte %   Date Value Ref Range Status   03/11/2021 7.7 5.0 - 12.0 % Final     Eosinophil %   Date Value Ref Range Status   03/11/2021 1.9 0.3 - 6.2 % Final     Basophil %   Date Value Ref Range Status   03/11/2021 0.4 0.0 - 1.5 % Final     Immature Grans %   Date Value Ref Range Status   03/11/2021 0.7 (H) 0.0 - 0.5 % Final     Neutrophils, Absolute   Date Value Ref Range Status   03/11/2021 11.43 (H) 1.70 - 7.00 10*3/mm3 Final     Lymphocytes, Absolute   Date Value Ref Range Status   03/11/2021 0.85 0.70 - 3.10 10*3/mm3 Final     Monocytes, Absolute   Date Value Ref Range Status   03/11/2021 1.06 (H) 0.10 - 0.90 10*3/mm3 Final     Eosinophils, Absolute   Date Value Ref Range Status   03/11/2021 0.26 0.00 - 0.40 10*3/mm3 Final     Basophils, Absolute   Date Value Ref Range Status   03/11/2021 0.05 0.00 - 0.20 10*3/mm3 Final     Immature Grans, Absolute   Date Value Ref Range Status   03/11/2021 0.09 (H) 0.00 - 0.05 10*3/mm3 Final     nRBC   Date Value Ref Range Status   03/11/2021 0.0 0.0 - 0.2 /100 WBC Final           A/P:  1. Left ankle cellulitis  2. DM 2    Patient seen at bedside.  CT reviewed.  Suspect findings of increased joint space and small air pockets just anterior to the ankle are more resultant of recent arthroscopy, however we will plan for irrigation debridement of left ankle with removal of residual talocalcaneal screw tomorrow morning.  All risk, benefits and potential complications discussed.  Leukocytosis downtrending.  Posterior heel culture from office growing MSSA and Acinetobacter, continue Unasyn for now.          This document has been electronically signed by Ignacio Lord DPM on March 11, 2021 16:13 CST

## 2021-03-11 NOTE — THERAPY EVALUATION
Patient Name: Ariana Martinez  : 1960    MRN: [id number]                              Today's Date: 3/11/2021       Admit Date: 3/9/2021    Visit Dx:     ICD-10-CM ICD-9-CM   1. Cellulitis of left lower extremity  L03.116 682.6   2. Sepsis, due to unspecified organism, unspecified whether acute organ dysfunction present (CMS/[facility])  A41.9 038.9     995.91   3. Pain of left calf  M79.662 729.5   4. Leg swelling  M79.89 729.81   5. Impaired functional mobility, balance, gait, and endurance  Z74.09 V49.89     Patient Active Problem List   Diagnosis   • 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, with long-term current use of insulin (CMS/[facility])   • Closed nondisplaced fracture of fifth left metatarsal bone   • MAURICIO (generalized anxiety disorder)   • Depression, major, recurrent, moderate (CMS/[facility])   • GERD without esophagitis   • Long term prescription opiate use   • Mixed hyperlipidemia   • Vitamin D deficiency   • Seasonal allergic rhinitis   • Restrictive lung disease secondary to obesity   • Snoring   • Class 3 severe obesity due to excess calories without serious comorbidity with body mass index (BMI) of 40.0 to 44.9 in adult (CMS/[facility])   • (HFpEF) heart failure with preserved ejection fraction (CMS/[facility])   • Cyanocobalamin deficiency   • CAD (coronary artery disease)   • Hypertension   • Meniere's disease   • Gastroparesis   • Otitis media with effusion, left   • Pulmonary hypertension (CMS/[facility])   • Displaced fracture of fifth metatarsal bone, left foot, subsequent encounter for fracture with nonunion   • Pes cavus   • Primary osteoarthritis involving multiple joints   • Generalized anxiety disorder   • Chronic right-sided low back pain with right-sided sciatica   • Chest pain   • Thrombocytosis (CMS/HCC)   • Leukocytosis   • Iron deficiency   • Adverse effect of iron   • Hindfoot varus, acquired, left   • Chest pain due to myocardial ischemia   • Nonrheumatic tricuspid valve  regurgitation   • Iron deficiency anemia due to chronic blood loss   • Malaise and fatigue   • Nonunion of subtalar arthrodesis   • Ankle arthritis   • Cellulitis of left lower extremity   • Staphylococcus aureus bacteremia with sepsis (CMS/[facility])   • Urinary retention     Past Medical History:   Diagnosis Date   • Angina, class IV (CMS/[facility])    • Anxiety    • Anxiety and depression    • Arthritis    • Benign paroxysmal positional vertigo    • Bladder disorder     has bladder stimulator   • Carpal tunnel syndrome    • CHF (congestive heart failure) (CMS/[facility])    • Chronic pain    • Coronary atherosclerosis     hx CABG 2005.  is followed by Dr Kwon   • Depression    • Diabetes mellitus (CMS/[facility])     Type 2, controlled   • Diabetic polyneuropathy (CMS/[facility])    • Disease of thyroid gland    • Elevated cholesterol    • Female stress incontinence    • Foot pain, left    • Full dentures    • Gastroparesis    • GERD (gastroesophageal reflux disease)    • Hyperlipidemia    • Hypertension    • Low back pain    • Malaise and fatigue    • Multiple joint pain    • Obesity     Refuses to be weighed   • Otalgia     Both   • Perforation of tympanic membrane     Left   • Postoperative wound infection    • Vitamin D deficiency    • Wears glasses     reading     Past Surgical History:   Procedure Laterality Date   • ABDOMINAL SURGERY     • ANGIOPLASTY      coronary   • ANKLE ARTHROSCOPY Left 2/26/2021    Procedure: Left foot hardware removal, ankle arthroscopy, bone grafting , left foot exostectomy;  Surgeon: Ignacio Lord DPM;  Location: [facility] OR;  Service: Podiatry;  Laterality: Left;   • BREAST BIOPSY Right    • CARDIAC CATHETERIZATION     • CARDIAC CATHETERIZATION N/A 6/20/2017    Procedure: Right Heart Cath;  Surgeon: Can Kwon MD PhD;  Location: [facility] CATH INVASIVE LOCATION;  Service:    • CARDIAC CATHETERIZATION N/A 2/18/2020    Procedure: Left Heart Cath;  Surgeon: Catalina Cooper MD;  Location:   [facility] CATH INVASIVE LOCATION;  Service: Cardiology;  Laterality: N/A;   • CARPAL TUNNEL RELEASE     • CHOLECYSTECTOMY     • COLONOSCOPY N/A 6/24/2020    Procedure: COLONOSCOPY;  Surgeon: Julián Maldonado MD;  Location: [facility] ENDOSCOPY;  Service: Gastroenterology;  Laterality: N/A;   • CORONARY ARTERY BYPASS GRAFT  2005   • ENDOSCOPY N/A 10/19/2018    Procedure: ESOPHAGOGASTRODUODENOSCOPY possible dilation;  Surgeon: Julián Maldonado MD;  Location: [facility] ENDOSCOPY;  Service: Gastroenterology   • ENDOSCOPY N/A 6/24/2020    Procedure: ESOPHAGOGASTRODUODENOSCOPY WED appt please;  Surgeon: Julián Maldonado MD;  Location: [facility] ENDOSCOPY;  Service: Gastroenterology;  Laterality: N/A;   • ENDOSCOPY AND COLONOSCOPY     • FOOT SURGERY      Toes   • FOOT SURGERY     • GASTRIC BANDING      Revision, laparoscopic   • HYSTERECTOMY     • MOUTH SURGERY     • SALPINGO OOPHORECTOMY     • SHOULDER SURGERY     • SUBTALAR ARTHRODESIS Left 1/16/2019    Procedure: LEFT FOOT HARDWARE REMOVAL, FIFTH METATARSAL , OPEN REDUCTION INTERNAL FIXATION, CALCANEAL OSTEOTOMY;  Surgeon: Ignacio Lord DPM;  Location: [facility] OR;  Service: Podiatry   • SUBTALAR ARTHRODESIS Left 10/16/2019    Procedure: foot hardware removal, subtalar joint fusion  possible de/reattachment of achilles tendon        (c-arm);  Surgeon: Ignacio Lord DPM;  Location: [facility] OR;  Service: Podiatry   • SUBTALAR ARTHRODESIS Left 9/30/2020    Procedure: subtalar, talonavicular joint arthrodesis.  Removal hardware.          (c-arm);  Surgeon: Ignacio Lord DPM;  Location: [facility] OR;  Service: Podiatry;  Laterality: Left;   • TRANSESOPHAGEAL ECHOCARDIOGRAM (LAMONTE)      With color flow     General Information     Row Name 03/11/21 0851          Physical Therapy Time and Intention    Document Type  evaluation  -CZ     Mode of Treatment  co-treatment;physical therapy;occupational therapy  -CZ     Row Name 03/11/21 0851          General Information    Patient Profile  Reviewed  yes  -CZ     Prior Level of Function  independent:;w/c or scooter;transfer;min assist:;gait  -CZ     Existing Precautions/Restrictions  fall  -CZ     Barriers to Rehab  previous functional deficit  -CZ     Row Name 03/11/21 0851          Living Environment    Lives With  spouse  -CZ     Row Name 03/11/21 0851          Home Main Entrance    Number of Stairs, Main Entrance  other (see comments) Ramp to enter.  -CZ     Row Name 03/11/21 0851          Stairs Within Home, Primary    Stairs, Within Home, Primary  Mobility via w/c, pushes self in house, spouse pushes w/c when outside of house. Has 4WW for transfers.  -CZ     Number of Stairs, Within Home, Primary  none  -CZ     Row Name 03/11/21 0851          Cognition    Orientation Status (Cognition)  oriented x 4  -CZ     Row Name 03/11/21 0851          Safety Issues, Functional Mobility    Impairments Affecting Function (Mobility)  strength;endurance/activity tolerance;balance  -CZ       User Key  (r) = Recorded By, (t) = Taken By, (c) = Cosigned By    Initials Name Provider Type    CZ Carlito Montoya, PT Physical Therapist        Mobility     Row Name 03/11/21 0851          Bed Mobility    Bed Mobility  supine-sit;sit-supine  -CZ     Supine-Sit [place] (Bed Mobility)  contact guard  -CZ     Sit-Supine [place] (Bed Mobility)  contact guard  -CZ     Assistive Device (Bed Mobility)  bed rails  -CZ     Row Name 03/11/21 0851          Bed-Chair Transfer    Bed-Chair [place] (Transfers)  minimum assist (75% patient effort)  -CZ     Assistive Device (Bed-Chair Transfers)  walker, front-wheeled  -CZ     Row Name 03/11/21 0851          Sit-Stand Transfer    Sit-Stand [place] (Transfers)  minimum assist (75% patient effort)  -CZ     Assistive Device (Sit-Stand Transfers)  walker, front-wheeled  -CZ     Row Name 03/11/21 0851          Gait/Stairs (Locomotion)    [place] Level (Gait)  minimum assist (75% patient effort)  -CZ     Distance in  Feet (Gait)  5'x1.  -CZ     Comment (Gait/Stairs)  NWB LLE, hops R to HOB.  -CZ     Row Name 03/11/21 0851          Mobility    Extremity Weight-bearing Status  left lower extremity  -CZ     Left Lower Extremity (Weight-bearing Status)  non weight-bearing (NWB)  -CZ       User Key  (r) = Recorded By, (t) = Taken By, (c) = Cosigned By    Initials Name Provider Type    CZ Carlito Montoya, PT Physical Therapist        Obj/Interventions     Row Name 03/11/21 0851          Range of Motion Comprehensive    General Range of Motion  bilateral lower extremity ROM WFL  -CZ     Comment, General Range of Motion  Except L ankle ROM not tested.  -CZ     Row Name 03/11/21 0851          Sensory Assessment (Somatosensory)    Sensory Assessment (Somatosensory)  right-sided sensation intact Poor sensation L lower leg and foot.  -CZ       User Key  (r) = Recorded By, (t) = Taken By, (c) = Cosigned By    Initials Name Provider Type    CZ Carlito Montoya, PT Physical Therapist        Goals/Plan     Row Name 03/11/21 0851          Bed Mobility Goal 1 (PT)    Activity/Assistive Device (Bed Mobility Goal 1, PT)  sit to supine/supine to sit  -CZ     [place] Level/Cues Needed (Bed Mobility Goal 1, PT)  independent  -CZ     Time Frame (Bed Mobility Goal 1, PT)  long term goal (LTG)  -CZ     Strategies/Barriers (Bed Mobility Goal 1, PT)  HOB flat, no bed rails.  -CZ     Progress/Outcomes (Bed Mobility Goal 1, PT)  goal not met  -CZ     Row Name 03/11/21 0851          Transfer Goal 1 (PT)    Activity/Assistive Device (Transfer Goal 1, PT)  bed-to-chair/chair-to-bed;sit-to-stand/stand-to-sit;walker, rolling  -CZ     [place] Level/Cues Needed (Transfer Goal 1, PT)  supervision required  -CZ     Time Frame (Transfer Goal 1, PT)  by discharge  -CZ     Strategies/Barriers (Transfers Goal 1, PT)  NWB LLE.  -CZ     Progress/Outcome (Transfer Goal 1, PT)  goal not met  -CZ     Row Name 03/11/21 0851          Gait Training Goal 1 (PT)     Activity/Assistive Device (Gait Training Goal 1, PT)  walker, rolling  -CZ     [place] Level (Gait Training Goal 1, PT)  contact guard assist  -CZ     Distance (Gait Training Goal 1, PT)  10' x 2.  -CZ     Time Frame (Gait Training Goal 1, PT)  by discharge  -CZ     Strategies/Barriers (Gait Training Goal 1, PT)  NWB LLE.  -CZ     Progress/Outcome (Gait Training Goal 1, PT)  goal not met  -CZ       User Key  (r) = Recorded By, (t) = Taken By, (c) = Cosigned By    Initials Name Provider Type    CZ Carlito Montoya, PT Physical Therapist        Clinical Impression     Row Name 03/11/21 0851          Pain    Additional Documentation  Pain Scale: Numbers Pre/Post-Treatment (Group)  -CZ     Row Name 03/11/21 0851          Pain Scale: Numbers Pre/Post-Treatment    Pretreatment Pain Rating  7/10  -CZ     Pain Location - Side  Left  -CZ     Pain Location  foot  -CZ     Pain Intervention(s)  Medication (See MAR);Repositioned;Distraction;Ambulation/increased activity  -CZ     Row Name 03/11/21 0851          Plan of Care Review    Plan of Care Reviewed With  patient  -CZ     Row Name 03/11/21 0851          Therapy Assessment/Plan (PT)    Rehab Potential (PT)  good, to achieve stated therapy goals  -CZ     Criteria for Skilled Interventions Met (PT)  yes;meets criteria  -CZ     Row Name 03/11/21 0851          Vital Signs    Pre Systolic BP Rehab  136  -CZ     Pre Treatment Diastolic BP  60  -CZ     Pretreatment Heart Rate (beats/min)  91  -CZ     Pre SpO2 (%)  92  -CZ     O2 Delivery Pre Treatment  room air  -CZ     Pre Patient Position  Supine  -CZ     Row Name 03/11/21 0851          Positioning and Restraints    Pre-Treatment Position  in bed  -CZ       User Key  (r) = Recorded By, (t) = Taken By, (c) = Cosigned By    Initials Name Provider Type    CZ Carlito Montoya, PT Physical Therapist        Outcome Measures     Row Name 03/11/21 0851          How much help from another person do you currently need...    Turning  from your back to your side while in flat bed without using bedrails?  3  -CZ     Moving from lying on back to sitting on the side of a flat bed without bedrails?  3  -CZ     Moving to and from a bed to a chair (including a wheelchair)?  3  -CZ     Standing up from a chair using your arms (e.g., wheelchair, bedside chair)?  3  -CZ     Climbing 3-5 steps with a railing?  3  -CZ     To walk in hospital room?  3  -CZ     AM-PAC 6 Clicks Score (PT)  18  -CZ     Row Name 03/11/21 0851          Functional Assessment    Outcome Measure Options  AM-PAC 6 Clicks Basic Mobility (PT)  -       User Key  (r) = Recorded By, (t) = Taken By, (c) = Cosigned By    Initials Name Provider Type    CZ Carlito Montoya, PT Physical Therapist        Physical Therapy Education                 Title: PT OT SLP Therapies (In Progress)     Topic: Physical Therapy (In Progress)     Point: Mobility training (Done)     Learning Progress Summary           Patient Acceptance, E, VU,NR by  at 3/11/2021 1148    Comment: Educated on proper technique with bed mobility, proper hand placement with transfers, proper use of walker.                   Point: Home exercise program (Not Started)     Learner Progress:  Not documented in this visit.          Point: Body mechanics (Not Started)     Learner Progress:  Not documented in this visit.          Point: Precautions (Not Started)     Learner Progress:  Not documented in this visit.                      User Key     Initials Effective Dates Name Provider Type Discipline     04/03/18 -  Carlito Montoya, PT Physical Therapist PT              PT Recommendation and Plan  Planned Therapy Interventions (PT): balance training, bed mobility training, gait training, patient/family education, transfer training, strengthening, stretching  Plan of Care Reviewed With: patient  Outcome Summary: Initial PT evaluation complete.  Patient is alert and cooperative. She requires CGA with bed mobility, min Ax1 with  transfers and gait.  Patient ambulates 5'x1, sidestepping R to HOB, maintains NWB on LLE.  Patient has spouse at home, ramp to enter home and a w/c; she may be able to discharge with just HHPT, pending progress with I/P PT. Goals established, continue skilled PT.     Time Calculation:   PT Charges     Row Name 03/11/21 1152             Time Calculation    Start Time  0852  -CZ      Stop Time  0920  -CZ      Time Calculation (min)  28 min  -CZ      PT Received On  03/11/21  -      PT Goal Re-Cert Due Date  03/24/21  -        User Key  (r) = Recorded By, (t) = Taken By, (c) = Cosigned By    Initials Name Provider Type    CZ Carlito Montoya, PT Physical Therapist        Therapy Charges for Today     Code Description Service Date Service Provider Modifiers Qty    81318529537 HC PT EVAL MOD COMPLEXITY 2 3/11/2021 Carlito Montoya, PT GP 1          PT G-Codes  Outcome Measure Options: AM-PAC 6 Clicks Basic Mobility (PT)  AM-PAC 6 Clicks Score (PT): 18    Carlito Montoya, PT  3/11/2021

## 2021-03-11 NOTE — PROGRESS NOTES
FAMILY MEDICINE DAILY PROGRESS NOTE    NAME: Ariana Martinez  : 1960  MRN: [id number]      LOS: 2 days     PROVIDER OF SERVICE: Seb Troncoso MD    Chief Complaint: Staphylococcus aureus bacteremia with sepsis (CMS/[facility])    Subjective:     Interval History:  History taken from: patient chart  Patient Complaints: Foot pain; stable chest pain; discomfort from the catheter  Blood cultures growing staph aureus and gram-negative bacteria.  CT left lower extremity planned for today. CT concerning for septic joint with worsening changes from prior imaging.       Review of Systems:   Review of Systems   Constitutional: Positive for fever. Negative for diaphoresis.   HENT: Negative for congestion and sore throat.    Eyes: Negative for pain and redness.   Respiratory: Negative for cough and shortness of breath.    Cardiovascular: Positive for chest pain. Negative for palpitations.   Gastrointestinal: Negative for abdominal pain and diarrhea.   Endocrine: Negative for polydipsia and polyphagia.   Genitourinary: Positive for difficulty urinating. Negative for flank pain.   Musculoskeletal: Positive for arthralgias and myalgias.   Neurological: Negative for dizziness and light-headedness.   Psychiatric/Behavioral: Negative for agitation and confusion.       Objective:     Vital Signs  Temp:  [97 °F (36.1 °C)-102.7 °F (39.3 °C)] 97 °F (36.1 °C)  Heart Rate:  [] 95  Resp:  [18-20] 18  BP: (117-142)/(54-78) 134/62   Body mass index is 43.12 kg/m².    Physical Exam  Physical Exam  Vitals and nursing note reviewed.   Constitutional:       General: She is not in acute distress.     Appearance: She is well-developed. She is obese. She is ill-appearing.   HENT:      Head: Normocephalic and atraumatic.   Eyes:      Conjunctiva/sclera: Conjunctivae normal.   Neck:      Vascular: No JVD.   Cardiovascular:      Rate and Rhythm: Normal rate and regular rhythm.      Heart sounds: Normal heart sounds. No murmur. No  friction rub. No gallop.    Pulmonary:      Effort: Pulmonary effort is normal.      Breath sounds: Normal breath sounds. No wheezing or rales.   Abdominal:      General: Bowel sounds are normal. There is no distension.      Palpations: Abdomen is soft.      Tenderness: There is no abdominal tenderness.   Skin:     General: Skin is warm and dry.      Capillary Refill: Capillary refill takes less than 2 seconds.      Comments: Left lower extremity wrapping in place   Neurological:      Mental Status: She is alert and oriented to person, place, and time.         Scheduled Meds   Pharmacy to dose vancomycin, , Does not apply, Once  ARIPiprazole, 15 mg, Oral, Daily  aspirin, 81 mg, Oral, Daily  atorvastatin, 80 mg, Oral, Daily  clopidogrel, 75 mg, Oral, Daily  furosemide, 40 mg, Oral, Daily  gabapentin, 400 mg, Oral, TID  insulin aspart, 0-7 Units, Subcutaneous, TID AC  insulin detemir, 60 Units, Subcutaneous, Nightly  metoprolol succinate XL, 25 mg, Oral, Daily  mirtazapine, 45 mg, Oral, Nightly  pantoprazole, 20 mg, Oral, Daily  piperacillin-tazobactam, 3.375 g, Intravenous, Q6H  sodium chloride, 10 mL, Intravenous, Q12H  sodium chloride 0.9% - IBW for BMI > 30, 30 mL/kg (Ideal), Intravenous, Once  vancomycin, 1,500 mg, Intravenous, Q12H  venlafaxine XR, 150 mg, Oral, Daily With Breakfast       PRN Meds   acetaminophen  •  bisacodyl  •  dextrose  •  dextrose  •  dextrose  •  glucagon (human recombinant)  •  guaiFENesin-dextromethorphan  •  HYDROcodone-acetaminophen  •  ibuprofen  •  LORazepam  •  meclizine  •  melatonin  •  metoclopramide  •  ondansetron  •  ondansetron  •  [COMPLETED] Insert peripheral IV **AND** sodium chloride  •  sodium chloride  •  traMADol      Diagnostic Data    Results from last 7 days   Lab Units 03/11/21  0259   WBC 10*3/mm3 13.74*   HEMOGLOBIN g/dL 10.2*   HEMATOCRIT % 30.6*   PLATELETS 10*3/mm3 354   GLUCOSE mg/dL 237*   CREATININE mg/dL 1.04*   BUN mg/dL 13   SODIUM mmol/L 132*      POTASSIUM mmol/L 3.5   AST (SGOT) U/L 11   ALT (SGPT) U/L 13   ALK PHOS U/L 114   BILIRUBIN mg/dL 0.6   ANION GAP mmol/L 8.0       US Guided Vascular Access    Result Date: 3/10/2021  Midline placement right cephalic vein. Vascular access device placed under ultrasound guidance as described above Electronically signed by:  Vamshi Rose MD  3/10/2021 5:03 PM CST Workstation: [id number]    US Venous Doppler Lower Extremity Left (duplex)    Result Date: 3/9/2021  No evidence of deep venous thrombosis in the common femoral, femoral, or popliteal veins of the left     lower extremity. Electronically signed by:  Naveed Taylor MD  3/9/2021 5:49 PM CST Workstation: [id number]    CT Angiogram Chest    Result Date: 3/9/2021  1.  No evidence of pulmonary embolism, though there is limited evaluation of segmental branches due to contrast phase.        2.  No evidence of acute pathology associated with the visualized aorta.    3. Linear to bandlike opacities in the upper lung zones probably represent atelectasis and/or scarring 4. Liver appears prominent, but is incompletely included in the study. 5. Pneumobilia, probably secondary to cholecystectomy and sphincterotomy. Electronically signed by:  Janki Molina MD  3/9/2021 8:54 PM CST Workstation: [id number]    CT Lower Extremity Left Without Contrast    Result Date: 3/10/2021  1. Slightly increased fragmentation of the talus and distal tibia previous study, including minimal destructive changes of the anterior aspect of the distal tibia. 2. Increasing joint fluid compared to the previous study, now with multiple tiny locules of gas, concerning for infection of joint fluid. 3. Slightly increasing fluid collection posterior to the tibiotalar joint, surrounding the Achilles tendon. 4. The posterior most calcaneal screw traversing the subtalar joint appears to have broken or crack since the previous study, though it is nondisplaced. [facility] requested to telephone these results to  licensed caregiver immediately at 6:18 PM EST on 3/16/2021. Electronically signed by:  Janki Molina MD  3/10/2021 5:19 PM CST Workstation: [id number]    I reviewed the patient's new clinical results.    Assessment/Plan:     Active Hospital Problems    Diagnosis  POA   • **Staphylococcus aureus bacteremia with sepsis (CMS/HCC) [A41.01]  Yes     Priority: High     Sepsis secondary to left lower extremity cellulitis. Given 1 L bolus in the ED. Procal elevated. S. Aureus growing on blood cultures.  Gram-negative also growing on blood cultures.  -Continue Vanco and Zosyn and adjust as necessary when sensitivities and culture further develops  -Echo ordered to assess for vegetations given staph bacteremia       • Cellulitis of left lower extremity [L03.116]  Yes     Priority: High     CTA chest shows no evidence of pulmonary embolism per radiologist interpretation. Duplex venous ultrasound showed no evidence of DVT per radiology interpretation.   -Vanc and Zosyn  -Dr. Lord (podiatry) on board will follow patient     • CAD (coronary artery disease) [I25.10]  Yes     -Continue metoprolol and Plavix     • Hypertension [I10]  Yes     -Continue metoprolol, hydrochlorothiazide, Lasix     • (HFpEF) heart failure with preserved ejection fraction (CMS/HCC) [I50.30]  Yes     -Continue Lasix, metoprolol, atorvastatin  -Consider adding ACE/ARB once sepsis and CHON resolves     • Depression, major, recurrent, moderate (CMS/HCC) [F33.1]  Yes     Continue home Effexor, Remeron, Ativan, Abilify     • 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, with long-term current use of insulin (CMS/HCC) [E11.49, E11.65, Z79.4]  Not Applicable     -Continue 60 units Levemir nightly  -Sliding scale insulin for additional coverage         DVT prophylaxis: Lovenox  Code status is   Code Status and Medical Interventions:   Ordered at: 03/09/21 8676     Code Status:    CPR     Medical Interventions (Level of Support Prior to  Arrest):    Full       Plan for disposition:>72 hours      Time: 18 min     This document has been electronically signed by Seb Troncoso MD on March 11, 2021 07:21 CST

## 2021-03-11 NOTE — ANESTHESIA PREPROCEDURE EVALUATION
Anesthesia Evaluation     Patient summary reviewed and Nursing notes reviewed   no history of anesthetic complications:  NPO Solid Status: > 8 hours  NPO Liquid Status: > 4 hours           Airway   Mallampati: II  TM distance: >3 FB  Neck ROM: full  Possible difficult intubation and Large neck circumference  Comment: General anesthesia 10/16.19, [place] #2, ETT 7.5, Grade 1. General anesthesia 2/26/21, MAC #3, ETT 7.0, Grade IIa.  Dental    (+) upper dentures and lower dentures    Pulmonary - negative pulmonary ROS    breath sounds clear to auscultation  (+) decreased breath sounds,   (-) COPD, asthma, sleep apnea, not a smoker    ROS comment: snoresFINDINGS: Low lung volumes.  No overt pulmonary vascular congestion, focal pulmonary  parenchymal opacity, pleural effusion or pneumothorax. Cardiac  silhouette is normal in size. Thoracic aorta contains  atherosclerotic calcification. Sternal suture wires appear stable     IMPRESSION:  CONCLUSION:  No acute pulmonary disease.     Electronically signed by:  Vamshi Rose MD  2/16/2020 10:07 AM  CST Workstation: [employer]  Cardiovascular - normal exam  Exercise tolerance: good (4-7 METS)    ECG reviewed  PT is on anticoagulation therapy  Patient on routine beta blocker and Beta blocker given within 24 hours of surgery  Rhythm: regular  Rate: normal    (+) hypertension poorly controlled 2 medications or greater, CAD, CABG >6 Months, cardiac stents more than 12 months ago hyperlipidemia,   (-) valvular problems/murmurs, dysrhythmias, angina, CHF, murmur, PVD, DVT    ROS comment: Rhythm: sinus rhythm  Rate: normal  BPM: 72  ST Segments: ST segments normal  Clinical impression: normal ECG  Interpreted by German      · Left Ventricle: Estimated EF appears to be in the range of 51 - 55%. Normal left ventricular cavity size noted  · There is moderate eccentric hypertrophy of the left ventricular cavity. Left ventricular diastolic dysfunction is noted (grade II w/high LAP)  consistent with pseudonormalization.  · Right Ventricle: Normal right ventricular wall thickness, systolic function and septal motion noted. Right ventricular cavity is borderline dilated  · No evidence of a patent foramen ovale. No evidence of an atrial septal defect present. Saline test results are negative.  · The aortic valve is abnormal with mild calcification of the right coronary cusp.  · Mitral Valve: The mitral valve is abnormal in structure. There is anterior leaflet thickening present. Mild mitral valve regurgitation is present.  · Trace to mild tricuspid valve regurgitation is present. Estimated right ventricular systolic pressure from tricuspid regurgitation is moderately elevated (45-55 mmHg)  · Mild pulmonary hypertension is present.  · There is no evidence of pericardial effusion.   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  The LAD is total from the midportion on.  The only flow given to the LAD region which also would include the apex in the inferoapical portion on a wraparound LAD is in the proximal LAD going into the diagonal branch.  This is been successfully stented with drug-eluting stents x2.  She has flow into the circumflex and right coronary artery and now except for the mid LAD portion is completely revascularized once again.    Neuro/Psych  (+) numbness (both feet), psychiatric history Anxiety and Depression,     (-) seizures, TIA, CVA, headaches  GI/Hepatic/Renal/Endo    (+) morbid obesity, GERD well controlled,  liver disease history of elevated LFT, diabetes mellitus type 2 using insulin,   (-) hepatitis, no renal disease    ROS Comment: HGB 10.2 HCT 30.6    Musculoskeletal         ROS comment: Left foot  Abdominal   (+) obese,    Substance History - negative use      OB/GYN negative ob/gyn ROS         Other   arthritis,      (-) history of cancer                  Anesthesia Plan    ASA 4     MAC and general   total IV anesthesia  intravenous induction     Anesthetic plan, all risks, benefits, and alternatives have been provided, discussed and informed consent has been obtained with: patient and spouse/significant other.

## 2021-03-11 NOTE — THERAPY EVALUATION
Patient Name: Ariana Martinez  : 1960    MRN: [id number]                              Today's Date: 3/11/2021       Admit Date: 3/9/2021    Visit Dx:     ICD-10-CM ICD-9-CM   1. Cellulitis of left lower extremity  L03.116 682.6   2. Sepsis, due to unspecified organism, unspecified whether acute organ dysfunction present (CMS/[facility])  A41.9 038.9     995.91   3. Pain of left calf  M79.662 729.5   4. Leg swelling  M79.89 729.81   5. Impaired functional mobility, balance, gait, and endurance  Z74.09 V49.89   6. Impaired mobility and ADLs  Z74.09 V49.89    Z78.9      Patient Active Problem List   Diagnosis   • 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, with long-term current use of insulin (CMS/[facility])   • Closed nondisplaced fracture of fifth left metatarsal bone   • MAURICIO (generalized anxiety disorder)   • Depression, major, recurrent, moderate (CMS/[facility])   • GERD without esophagitis   • Long term prescription opiate use   • Mixed hyperlipidemia   • Vitamin D deficiency   • Seasonal allergic rhinitis   • Restrictive lung disease secondary to obesity   • Snoring   • Class 3 severe obesity due to excess calories without serious comorbidity with body mass index (BMI) of 40.0 to 44.9 in adult (CMS/[facility])   • (HFpEF) heart failure with preserved ejection fraction (CMS/[facility])   • Cyanocobalamin deficiency   • CAD (coronary artery disease)   • Hypertension   • Meniere's disease   • Gastroparesis   • Otitis media with effusion, left   • Pulmonary hypertension (CMS/[facility])   • Displaced fracture of fifth metatarsal bone, left foot, subsequent encounter for fracture with nonunion   • Pes cavus   • Primary osteoarthritis involving multiple joints   • Generalized anxiety disorder   • Chronic right-sided low back pain with right-sided sciatica   • Chest pain   • Thrombocytosis (CMS/[facility])   • Leukocytosis   • Iron deficiency   • Adverse effect of iron   • Hindfoot varus, acquired, left   • Chest pain due to  myocardial ischemia   • Nonrheumatic tricuspid valve regurgitation   • Iron deficiency anemia due to chronic blood loss   • Malaise and fatigue   • Nonunion of subtalar arthrodesis   • Ankle arthritis   • Cellulitis of left lower extremity   • Staphylococcus aureus bacteremia with sepsis (CMS/[facility])   • Urinary retention     Past Medical History:   Diagnosis Date   • Angina, class IV (CMS/[facility])    • Anxiety    • Anxiety and depression    • Arthritis    • Benign paroxysmal positional vertigo    • Bladder disorder     has bladder stimulator   • Carpal tunnel syndrome    • CHF (congestive heart failure) (CMS/[facility])    • Chronic pain    • Coronary atherosclerosis     hx CABG 2005.  is followed by Dr Kwon   • Depression    • Diabetes mellitus (CMS/[facility])     Type 2, controlled   • Diabetic polyneuropathy (CMS/[facility])    • Disease of thyroid gland    • Elevated cholesterol    • Female stress incontinence    • Foot pain, left    • Full dentures    • Gastroparesis    • GERD (gastroesophageal reflux disease)    • Hyperlipidemia    • Hypertension    • Low back pain    • Malaise and fatigue    • Multiple joint pain    • Obesity     Refuses to be weighed   • Otalgia     Both   • Perforation of tympanic membrane     Left   • Postoperative wound infection    • Vitamin D deficiency    • Wears glasses     reading     Past Surgical History:   Procedure Laterality Date   • ABDOMINAL SURGERY     • ANGIOPLASTY      coronary   • ANKLE ARTHROSCOPY Left 2/26/2021    Procedure: Left foot hardware removal, ankle arthroscopy, bone grafting , left foot exostectomy;  Surgeon: Ignacio Lord DPM;  Location: [facility] OR;  Service: Podiatry;  Laterality: Left;   • BREAST BIOPSY Right    • CARDIAC CATHETERIZATION     • CARDIAC CATHETERIZATION N/A 6/20/2017    Procedure: Right Heart Cath;  Surgeon: Can Kwon MD PhD;  Location: [facility] CATH INVASIVE LOCATION;  Service:    • CARDIAC CATHETERIZATION N/A 2/18/2020    Procedure: Left Heart  Cath;  Surgeon: Catalina Cooper MD;  Location: [facility] CATH INVASIVE LOCATION;  Service: Cardiology;  Laterality: N/A;   • CARPAL TUNNEL RELEASE     • CHOLECYSTECTOMY     • COLONOSCOPY N/A 6/24/2020    Procedure: COLONOSCOPY;  Surgeon: Julián Maldonado MD;  Location: [facility] ENDOSCOPY;  Service: Gastroenterology;  Laterality: N/A;   • CORONARY ARTERY BYPASS GRAFT  2005   • ENDOSCOPY N/A 10/19/2018    Procedure: ESOPHAGOGASTRODUODENOSCOPY possible dilation;  Surgeon: Julián Maldonado MD;  Location: [facility] ENDOSCOPY;  Service: Gastroenterology   • ENDOSCOPY N/A 6/24/2020    Procedure: ESOPHAGOGASTRODUODENOSCOPY WED appt please;  Surgeon: Julián Maldonado MD;  Location: [facility] ENDOSCOPY;  Service: Gastroenterology;  Laterality: N/A;   • ENDOSCOPY AND COLONOSCOPY     • FOOT SURGERY      Toes   • FOOT SURGERY     • GASTRIC BANDING      Revision, laparoscopic   • HYSTERECTOMY     • MOUTH SURGERY     • SALPINGO OOPHORECTOMY     • SHOULDER SURGERY     • SUBTALAR ARTHRODESIS Left 1/16/2019    Procedure: LEFT FOOT HARDWARE REMOVAL, FIFTH METATARSAL , OPEN REDUCTION INTERNAL FIXATION, CALCANEAL OSTEOTOMY;  Surgeon: Ignacio Lord DPM;  Location: [facility] OR;  Service: Podiatry   • SUBTALAR ARTHRODESIS Left 10/16/2019    Procedure: foot hardware removal, subtalar joint fusion  possible de/reattachment of achilles tendon        (c-arm);  Surgeon: Ignacio Lord DPM;  Location: [facility] OR;  Service: Podiatry   • SUBTALAR ARTHRODESIS Left 9/30/2020    Procedure: subtalar, talonavicular joint arthrodesis.  Removal hardware.          (c-arm);  Surgeon: Ignacio Lord DPM;  Location: [facility] OR;  Service: Podiatry;  Laterality: Left;   • TRANSESOPHAGEAL ECHOCARDIOGRAM (LAMONTE)      With color flow     General Information     Row Name 03/11/21 0840          OT Time and Intention    Document Type  evaluation  -ME     Mode of Treatment  co-treatment;occupational therapy;physical therapy  -ME     Row Name 03/11/21 0857           General Information    Patient Profile Reviewed  yes  -ME     Prior Level of Function  independent:;w/c or scooter;ADL's;all household mobility;community mobility  -ME     Existing Precautions/Restrictions  fall;non-weight bearing  -ME     Barriers to Rehab  previous functional deficit  -ME     Row Name 03/11/21 0840          Living Environment    Lives With  spouse  -ME     Row Name 03/11/21 0840          Home Main Entrance    Number of Stairs, Main Entrance  other (see comments) ramp  -ME     Stair Railings, Main Entrance  railings on both sides of stairs  -ME     Row Name 03/11/21 0840          Stairs Within Home, Primary    Stairs, Within Home, Primary  has been using the wheelchair mobility (states that she has been using this on and off for the last 3 years); has a rollator; walk in shower with a shower chair; no grab bars; states that the last time she walked was approx 2 weeks ago  -ME     Number of Stairs, Within Home, Primary  none  -ME     Row Name 03/11/21 0840          Cognition    Orientation Status (Cognition)  oriented x 4  -ME     Row Name 03/11/21 0840          Safety Issues, Functional Mobility    Safety Issues Affecting Function (Mobility)  safety precaution awareness;safety precautions follow-through/compliance  -ME     Impairments Affecting Function (Mobility)  balance;endurance/activity tolerance;strength;pain  -ME       User Key  (r) = Recorded By, (t) = Taken By, (c) = Cosigned By    Initials Name Provider Type    ME Severiano Pettit OTR/L Occupational Therapist          Mobility/ADL's     Row Name 03/11/21 0840          Bed Mobility    Bed Mobility  supine-sit;sit-supine  -ME     Supine-Sit Hooper (Bed Mobility)  contact guard  -ME     Sit-Supine Hooper (Bed Mobility)  contact guard  -ME     Assistive Device (Bed Mobility)  bed rails;head of bed elevated  -ME     Comment (Bed Mobility)  completed x 2  -ME     Row Name 03/11/21 0840          Transfers    Transfers  sit-stand  transfer;bed-chair transfer  -ME     Comment (Transfers)  bed to stretcher at end of session; verbal cues required for proper hand placement and for weight bearing  -ME     Bed-Chair [place] (Transfers)  minimum assist (75% patient effort)  -ME     Assistive Device (Bed-Chair Transfers)  walker, 4-wheeled  -ME     Sit-Stand [place] (Transfers)  minimum assist (75% patient effort)  -ME     Row Name 03/11/21 0840          Sit-Stand Transfer    Assistive Device (Sit-Stand Transfers)  walker, front-wheeled  -ME     Row Name 03/11/21 0840          Functional Mobility    Functional Mobility- Ind. Level  minimum assist (75% patient effort)  -ME     Functional Mobility- Device  rolling walker  -ME     Functional Mobility- Comment  short distance at EOB; verbal cues for weight bearing precautions  -ME     Row Name 03/11/21 0840          Activities of Daily Living    BADL Assessment/Intervention  lower body dressing  -ME     Row Name 03/11/21 0840          Mobility    Extremity Weight-bearing Status  (S) left lower extremity  -ME     Left Lower Extremity (Weight-bearing Status)  (S) non weight-bearing (NWB)  -[facility] Name 03/11/21 0840          Lower Body Dressing Assessment/Training    [place] Level (Lower Body Dressing)  doff;don;socks;dependent (less than 25% patient effort)  -ME     Position (Lower Body Dressing)  sitting up in bed  -ME       User Key  (r) = Recorded By, (t) = Taken By, (c) = Cosigned By    Initials Name Provider Type    ME Severiano Pettit OTR/L Occupational Therapist        Obj/Interventions     Row Name 03/11/21 0840          Sensory Assessment (Somatosensory)    Sensory Assessment (Somatosensory)  UE sensation intact  -[facility] Name 03/11/21 0840          Range of Motion Comprehensive    General Range of Motion  no range of motion deficits identified;bilateral upper extremity ROM WFL  -[facility] Name 03/11/21 0840          Strength Comprehensive (MMT)    General Manual Muscle  Testing (MMT) Assessment  other (see comments)  -ME     Comment, General Manual Muscle Testing (MMT) Assessment  BUE strength and bilateral  strength are grossly 4- to 4/5; pt is right handed  -ME       User Key  (r) = Recorded By, (t) = Taken By, (c) = Cosigned By    Initials Name Provider Type    ME Severiano Pettit, OTR/L Occupational Therapist        Goals/Plan     Row Name 03/11/21 0840          Transfer Goal 1 (OT)    Activity/Assistive Device (Transfer Goal 1, OT)  toilet  -ME     Young Level/Cues Needed (Transfer Goal 1, OT)  contact guard assist  -ME     Time Frame (Transfer Goal 1, OT)  long term goal (LTG);by discharge  -ME     Strategies/Barriers (Transfers Goal 1, OT)  NWB LLE  -ME     Progress/Outcome (Transfer Goal 1, OT)  goal not met  -ME     Row Name 03/11/21 0840          Bathing Goal 1 (OT)    Activity/Device (Bathing Goal 1, OT)  lower body bathing AD as needed  -ME     Young Level/Cues Needed (Bathing Goal 1, OT)  supervision required  -ME     Time Frame (Bathing Goal 1, OT)  long term goal (LTG);by discharge  -ME     Progress/Outcomes (Bathing Goal 1, OT)  goal not met  -ME     Row Name 03/11/21 0840          Dressing Goal 1 (OT)    Activity/Device (Dressing Goal 1, OT)  lower body dressing AD as needed  -ME     Young/Cues Needed (Dressing Goal 1, OT)  supervision required  -ME     Time Frame (Dressing Goal 1, OT)  long term goal (LTG);by discharge  -ME     Progress/Outcome (Dressing Goal 1, OT)  goal not met  -ME     Row Name 03/11/21 0840          Therapy Assessment/Plan (OT)    Planned Therapy Interventions (OT)  activity tolerance training;adaptive equipment training;BADL retraining;functional balance retraining;IADL retraining;occupation/activity based interventions;ROM/therapeutic exercise;strengthening exercise;transfer/mobility retraining;patient/caregiver education/training  -ME       User Key  (r) = Recorded By, (t) = Taken By, (c) = Cosigned By    Initials  Name Provider Type    ME Severiano Pettit OTR/L Occupational Therapist        Clinical Impression     Row Name 03/11/21 0840          Pain Assessment    Additional Documentation  Pain Scale: Numbers Pre/Post-Treatment (Group)  -ME     Row Name 03/11/21 0840          Pain Scale: Numbers Pre/Post-Treatment    Pretreatment Pain Rating  7/10  -ME     Posttreatment Pain Rating  7/10  -ME     Pain Location - Side  Left  -ME     Pain Location  foot  -ME     Pain Intervention(s)  Medication (See MAR);Repositioned;Ambulation/increased activity;Distraction  -ME     Row Name 03/11/21 0840          Plan of Care Review    Plan of Care Reviewed With  patient  -ME     Row Name 03/11/21 0840          Therapy Assessment/Plan (OT)    Patient/Family Therapy Goal Statement (OT)  to return home  -ME     Rehab Potential (OT)  good, to achieve stated therapy goals  -ME     Criteria for Skilled Therapeutic Interventions Met (OT)  yes;meets criteria;skilled treatment is necessary  -ME     Therapy Frequency (OT)  other (see comments) 5-7 days per week  -ME     Row Name 03/11/21 0840          Therapy Plan Review/Discharge Plan (OT)    Anticipated Discharge Disposition (OT)  home;home with 24/7 care  -ME     Row Name 03/11/21 0840          Vital Signs    Pre Systolic BP Rehab  136  -ME     Pre Treatment Diastolic BP  60  -ME     Pretreatment Heart Rate (beats/min)  91  -ME     Pre SpO2 (%)  92  -ME     O2 Delivery Pre Treatment  room air  -ME     Pre Patient Position  Supine  -ME     Row Name 03/11/21 0840          Positioning and Restraints    Pre-Treatment Position  in bed  -ME     Post Treatment Position  other  -ME     Other Position  with other staff with transport  -ME       User Key  (r) = Recorded By, (t) = Taken By, (c) = Cosigned By    Initials Name Provider Type    ME Severiano Pettit OTR/L Occupational Therapist        Outcome Measures     Row Name 03/11/21 0840          How much help from another is currently needed...    Putting on  and taking off regular lower body clothing?  2  -ME     Bathing (including washing, rinsing, and drying)  2  -ME     Toileting (which includes using toilet bed pan or urinal)  2  -ME     Putting on and taking off regular upper body clothing  3  -ME     Taking care of personal grooming (such as brushing teeth)  4  -ME     Eating meals  4  -ME     AM-PAC 6 Clicks Score (OT)  17  -ME     Row Name 03/11/21 0840          Functional Assessment    Outcome Measure Options  AM-PAC 6 Clicks Daily Activity (OT)  -ME       User Key  (r) = Recorded By, (t) = Taken By, (c) = Cosigned By    Initials Name Provider Type    ME Severiano Pettit OTR/L Occupational Therapist        Occupational Therapy Education                 Title: PT OT SLP Therapies (In Progress)     Topic: Occupational Therapy (In Progress)     Point: ADL training (Not Started)     Description:   Instruct learner(s) on proper safety adaptation and remediation techniques during self care or transfers.   Instruct in proper use of assistive devices.              Learner Progress:  Not documented in this visit.          Point: Home exercise program (Not Started)     Description:   Instruct learner(s) on appropriate technique for monitoring, assisting and/or progressing therapeutic exercises/activities.              Learner Progress:  Not documented in this visit.          Point: Precautions (Done)     Description:   Instruct learner(s) on prescribed precautions during self-care and functional transfers.              Learning Progress Summary           Patient Acceptance, E, VU by ME at 3/11/2021 4834    Comment: Educated on OT and POC. Educated to call for assistance. Educated on safety and weight bearing precautions.                   Point: Body mechanics (Done)     Description:   Instruct learner(s) on proper positioning and spine alignment during self-care, functional mobility activities and/or exercises.              Learning Progress Summary           Patient  Acceptance, E, VU by ME at 3/11/2021 1248    Comment: Educated on OT and POC. Educated to call for assistance. Educated on safety and weight bearing precautions.                               User Key     Initials Effective Dates Name Provider Type Discipline    ME 08/24/20 -  Severiano Pettit, OTR/L Occupational Therapist OT              OT Recommendation and Plan  Planned Therapy Interventions (OT): activity tolerance training, adaptive equipment training, BADL retraining, functional balance retraining, IADL retraining, occupation/activity based interventions, ROM/therapeutic exercise, strengthening exercise, transfer/mobility retraining, patient/caregiver education/training  Therapy Frequency (OT): other (see comments) (5-7 days per week)  Plan of Care Review  Plan of Care Reviewed With: patient  Outcome Summary: initial OT evaluation complete. pt was pleasant and cooperative throughout, does require some motivation for OOB activity secondary to pt fear of falling. pt was dependent for donning/doffing socks. BUE ROMWFL grossly. BUE strength is grossly 4- to 4/5. pt is NWB LLE. completed bed mobiltiy with CGA. min A for sit to stand, stand to sit, and bed to stretcher transfers. RW for mobility and transfers. was able to ambulate at bedside with min A and use of RW. maintains weight bearing status. verbal cues for hand placement with transfers. recommend further skilled OT services to address functional mobility and ADL deficits, as well as balance, strength, and endurance. recommend home with 24/7 care at discharge. goals established.     Time Calculation:   Time Calculation- OT     Row Name 03/11/21 1248             Time Calculation- OT    OT Start Time  0840  -ME      OT Stop Time  0920  -ME      OT Time Calculation (min)  40 min  -ME      OT Received On  03/11/21  -ME      OT Goal Re-Cert Due Date  03/24/21  -ME        User Key  (r) = Recorded By, (t) = Taken By, (c) = Cosigned By    Initials Name Provider Type     ME Severiano Pettit, OTR/L Occupational Therapist        Therapy Charges for Today     Code Description Service Date Service Provider Modifiers Qty    [id number] HC OT EVAL MOD COMPLEXITY 3 3/11/2021 Severiano Pettit OTR/L GO 1               NICA Chinchilla/JORDAN  3/11/2021

## 2021-03-11 NOTE — PLAN OF CARE
Goal Outcome Evaluation:  Plan of Care Reviewed With: patient     Outcome Summary: initial OT evaluation complete. pt was pleasant and cooperative throughout, does require some motivation for OOB activity secondary to pt fear of falling. pt was dependent for donning/doffing socks. BUE ROMWFL grossly. BUE strength is grossly 4- to 4/5. pt is NWB LLE. completed bed mobiltiy with CGA. min A for sit to stand, stand to sit, and bed to stretcher transfers. RW for mobility and transfers. was able to ambulate at bedside with min A and use of RW. maintains weight bearing status. verbal cues for hand placement with transfers. recommend further skilled OT services to address functional mobility and ADL deficits, as well as balance, strength, and endurance. recommend home with 24/7 care at discharge. goals established.

## 2021-03-11 NOTE — PAYOR COMM NOTE
"Ariana Bailey (61 y.o. Female)     Date of Birth Social Security Number Address Home Phone MRN    1960  [id number]  [facility] [address] [phone] [id number]    Adventism Marital Status          Zoroastrian        Admission Date Admission Type Admitting Provider Attending Provider Department, Room/Bed    3/9/21 Emergency Tirso Herrera MD Weber, Alexander B, MD [facility] 3 EAST, 372/1    Discharge Date Discharge Disposition Discharge Destination                       Attending Provider: Seb Troncoso MD    Allergies: Seroquel [Quetiapine], Avandia [Rosiglitazone], Morphine And Related, Oxycodone    Isolation: None   Infection: None   Code Status: CPR    Ht: 172.7 cm (67.99\")   Wt: 129 kg (284 lb 6.3 oz)    Admission Cmt: None   Principal Problem: Staphylococcus aureus bacteremia with sepsis (CMS/[facility]) [A41.01] More...                 Active Insurance as of 3/9/2021     Primary Coverage     Payor Plan Insurance Group Employer/Plan Group    [facility] BLUE CROSS [employer] EMPLOYEE [id number]     Payor Plan Address Payor Plan Phone Number Payor Plan Fax Number Effective Dates    [address] [phone]  1/1/2015 - None Entered    [place] [address]       Subscriber Name Subscriber Birth Date Member ID       ARIANA BAILEY 1960 [id number]                 Emergency Contacts      (Rel.) Home Phone Work Phone Mobile Phone    Nadeem Bailey (Spouse) [phone] -- [phone]    Елена David (Sister) [phone] -- [phone]        Joanna De Dios RN [facility]  [phone] phone  [phone] fax  Ref#[id number]       History & Physical      Kiara Whitt MD at 03/09/21 2132     Attestation signed by Tirso Herrera MD at 03/10/21 6783    I have performed a history and physical examination of the patient. I have discussed the management of the patient with the resident.  I have reviewed the notes, " "assessment and plan, and/or procedures  performed by the resident. I concur with the resident’s documentation.        Physical Exam:  General: NAD.  CV: S1 and S2 normal. RRR.  Pulmonary: Clear to auscultation bilaterally, no wheezing, no rales.   Abdomen: Bowel sounds present and normal. Abdomen is soft, obese, and nontender   Extremities: dressing noted over the LLE    Plan: 61 y.o. female with a CMH of coronary artery disease, depression, hypertension, type 2 diabetes mellitus and a recent left ankle arthroscopy, exostectomy, hardware removal of the left foot on 2021 admitted for treatment of Cellulitis. Pt on iv antibiotics. Podiatry consulted.                       HISTORY AND PHYSICAL  NAME: Ariana Martinez  : 1960  MRN: [id number]    DATE OF ADMISSION:  3/9/2021     DATE & TIME SEEN: 21 at 2131    PCP: Kimberly Ferguson APRN    CODE STATUS: Full code    CHIEF COMPLAINT: Leg pain and swelling    HPI:  Ariana Martinez is a 61 y.o. female with a CMH of coronary artery disease, depression, hypertension, type 2 diabetes mellitus and a recent left ankle arthroscopy, exostectomy, hardware removal of the left foot on 2021 with Dr. Lord (Podiatry) who presents to the ED with complaints of left lower extremity pain and swelling. Patient states that last night she was hobbling on one leg from her bedside commode to her bed when she heard a \"pop\" on her left foot. Patient had a clinic appointment with Dr. Lord today for follow-up of recent surgery where he completed some blood work. Given that patient developed worsening of lower extremity pain and swelling, Dr. Lord recommended that patient go to the ED to rule out DVT.     In the ED, patient was given Norco for pain, and started on Vanco and Zosyn.  She was also given 1 L bolus.  CTA chest did not show any evidence of PE.  Duplex venous ultrasound did not show any evidence of DVT.  Patient had a lactate of 2.1 and WBC of 22.      "     CONCURRENT MEDICAL HISTORY:  Past Medical History:   Diagnosis Date   • Angina, class IV (CMS/[facility])    • Anxiety    • Anxiety and depression    • Arthritis    • Benign paroxysmal positional vertigo    • Bladder disorder     has bladder stimulator   • Carpal tunnel syndrome    • CHF (congestive heart failure) (CMS/[facility])    • Chronic pain    • Coronary atherosclerosis     hx CABG 2005.  is followed by Dr Kwon   • Depression    • Diabetes mellitus (CMS/[facility])     Type 2, controlled   • Diabetic polyneuropathy (CMS/[facility])    • Disease of thyroid gland    • Elevated cholesterol    • Female stress incontinence    • Foot pain, left    • Full dentures    • Gastroparesis    • GERD (gastroesophageal reflux disease)    • Hyperlipidemia    • Hypertension    • Low back pain    • Malaise and fatigue    • Multiple joint pain    • Obesity     Refuses to be weighed   • Otalgia     Both   • Perforation of tympanic membrane     Left   • Postoperative wound infection    • Vitamin D deficiency    • Wears glasses     reading       PAST SURGICAL HISTORY:  Past Surgical History:   Procedure Laterality Date   • ABDOMINAL SURGERY     • ANGIOPLASTY      coronary   • ANKLE ARTHROSCOPY Left 2/26/2021    Procedure: Left foot hardware removal, ankle arthroscopy, bone grafting , left foot exostectomy;  Surgeon: Ignacio Lord DPM;  Location: [facility] OR;  Service: Podiatry;  Laterality: Left;   • BREAST BIOPSY Right    • CARDIAC CATHETERIZATION     • CARDIAC CATHETERIZATION N/A 6/20/2017    Procedure: Right Heart Cath;  Surgeon: Can Kwon MD PhD;  Location: [facility] CATH INVASIVE LOCATION;  Service:    • CARDIAC CATHETERIZATION N/A 2/18/2020    Procedure: Left Heart Cath;  Surgeon: Catalina Cooper MD;  Location: [facility] CATH INVASIVE LOCATION;  Service: Cardiology;  Laterality: N/A;   • CARPAL TUNNEL RELEASE     • CHOLECYSTECTOMY     • COLONOSCOPY N/A 6/24/2020    Procedure: COLONOSCOPY;  Surgeon: Julián Maldonado MD;   Location: [facility] ENDOSCOPY;  Service: Gastroenterology;  Laterality: N/A;   • CORONARY ARTERY BYPASS GRAFT  2005   • ENDOSCOPY N/A 10/19/2018    Procedure: ESOPHAGOGASTRODUODENOSCOPY possible dilation;  Surgeon: Julián Maldonado MD;  Location: [facility] ENDOSCOPY;  Service: Gastroenterology   • ENDOSCOPY N/A 6/24/2020    Procedure: ESOPHAGOGASTRODUODENOSCOPY WED appt please;  Surgeon: Julián Maldonado MD;  Location: [facility] ENDOSCOPY;  Service: Gastroenterology;  Laterality: N/A;   • ENDOSCOPY AND COLONOSCOPY     • FOOT SURGERY      Toes   • FOOT SURGERY     • GASTRIC BANDING      Revision, laparoscopic   • HYSTERECTOMY     • MOUTH SURGERY     • SALPINGO OOPHORECTOMY     • SHOULDER SURGERY     • SUBTALAR ARTHRODESIS Left 1/16/2019    Procedure: LEFT FOOT HARDWARE REMOVAL, FIFTH METATARSAL , OPEN REDUCTION INTERNAL FIXATION, CALCANEAL OSTEOTOMY;  Surgeon: Ignacio Lord DPM;  Location: [facility] OR;  Service: Podiatry   • SUBTALAR ARTHRODESIS Left 10/16/2019    Procedure: foot hardware removal, subtalar joint fusion  possible de/reattachment of achilles tendon        (c-arm);  Surgeon: Ignacio Lord DPM;  Location: [facility] OR;  Service: Podiatry   • SUBTALAR ARTHRODESIS Left 9/30/2020    Procedure: subtalar, talonavicular joint arthrodesis.  Removal hardware.          (c-arm);  Surgeon: Ignacio Lord DPM;  Location: [facility] OR;  Service: Podiatry;  Laterality: Left;   • TRANSESOPHAGEAL ECHOCARDIOGRAM (LAMONTE)      With color flow       FAMILY HISTORY:  Family History   Problem Relation Age of Onset   • Diabetes Other    • Heart disease Other    • Hypertension Other    • Heart disease Mother    • Stroke Mother    • Hypertension Mother    • Diabetes Sister    • Heart disease Sister    • Hypertension Sister    • Heart disease Sister    • Diabetes Sister    • Hypertension Sister    • Diabetes Sister    • Diabetes Sister    • Diabetes Sister    • Diabetes Sister         SOCIAL HISTORY:  Social History      Socioeconomic History   • Marital status:      Spouse name: Not on file   • Number of children: Not on file   • Years of education: Not on file   • Highest education level: Not on file   Tobacco Use   • Smoking status: Never Smoker   • Smokeless tobacco: Never Used   Vaping Use   • Vaping Use: Never used   Substance and Sexual Activity   • Alcohol use: No   • Drug use: No   • Sexual activity: Defer       HOME MEDICATIONS:  Prior to Admission medications    Medication Sig Start Date End Date Taking? Authorizing Provider   Accu-Chek Iris Plus test strip USE TO CHECK BLOOD SUGAR 4 TIMES DAILY. ACCUCHECK, E11.9 1/13/21   Elvis Gamboa APRN   ARIPiprazole (ABILIFY) 15 MG tablet Take 1 tablet by mouth Daily. 3/14/19   Francisca Fong MD   aspirin 81 MG chewable tablet Chew 81 mg daily.    Provider, MD Esther   atorvastatin (LIPITOR) 80 MG tablet TAKE 1 TABLET BY MOUTH EVERY DAY 12/22/20   Marty, BEBE Luu   BD SHARPS CONTAINER HOME misc 1 each Take As Directed. 9/7/18   Francisca Fong MD   Blood Glucose Monitoring Suppl (ONE TOUCH ULTRA MINI) w/Device kit USE AS DIRECTED TO CHECK BLOOD SUGAR 7/11/18   Francisca Fong MD   Calcium Citrate-Vitamin D (CITRACAL/VITAMIN D) 250-200 MG-UNIT tablet Take 2 tablets by mouth 2 (two) times a day.    Provider, MD Esther   clopidogrel (PLAVIX) 75 MG tablet Take 1 tablet by mouth Daily. 12/23/20   Geri Baig MD   cyanocobalamin 1000 MCG/ML injection INJECT 1 ML INTO THE APPROPRIATE MUSCLE AS DIRECTED BY PRESCRIBER EVERY 28 (TWENTY-EIGHT) DAYS. 12/23/20   Geri Baig MD   doxycycline (VIBRAMYCIN) 100 MG capsule Take 1 capsule by mouth 2 (Two) Times a Day. 3/9/21   Ignacio Lord DPM   folic acid (FOLVITE) 1 MG tablet Take 1 tablet by mouth Daily. 12/22/20   Teressa Hammond APRN   furosemide (LASIX) 40 MG tablet TAKE 1 TABLET BY MOUTH EVERY DAY  Patient taking differently: Take 40 mg by mouth Daily. 8/31/20    Kimberly Ferguson APRN   gabapentin (NEURONTIN) 400 MG capsule Take 1 capsule by mouth 3 (Three) Times a Day. 1/31/21   Kimberly Ferguson APRN   GLUCAGON EMERGENCY 1 MG injection USE AS DIRECTED AS NEEDED 7/28/17   Elvis Gamboa APRN   glucose monitor monitoring kit 1 each Daily. accucheck eve meter, E11.9 6/11/20   Elvis Gamboa APRN   hydroCHLOROthiazide (HYDRODIURIL) 12.5 MG tablet Take 12.5 mg by mouth Daily.    Provider, MD Esther   Insulin Glargine (BASAGLAR KWIKPEN) 100 UNIT/ML injection pen Inject 100 units under skin in the the appropriate area as directed every night.   Patient taking differently: 60 Units Every Night. 6/9/20   Elvis Gamboa APRN   Insulin Pen Needle (B-D ULTRAFINE III SHORT PEN) 31G X 8 MM misc USE TO INJECT 4 TIMES A DAY 12/27/20   Elvis Gamboa APRN   LORazepam (ATIVAN) 0.5 MG tablet TAKE 1 TABLET BY MOUTH EVERY DAY AS NEEDED FOR ANXIETY 3/8/21   Kimberly Ferguson APRN   meclizine (ANTIVERT) 25 MG tablet Take 1 tablet by mouth 3 (Three) Times a Day As Needed for dizziness. 12/14/17   Evon Hernandez APRN   metoclopramide (REGLAN) 10 MG tablet TAKE 1 TABLET BY MOUTH FOUR TIMES A DAY AS NEEDED 1/27/21   Marcela Lawson APRN   metoprolol succinate XL (TOPROL-XL) 25 MG 24 hr tablet TAKE 1 TABLET BY MOUTH EVERY DAY  Patient taking differently: Take 25 mg by mouth Daily. 6/1/20   Bill Gibson MD   mirtazapine (REMERON) 45 MG tablet TAKE 1 TABLET BY MOUTH EVERY NIGHT. 7/17/19   Francisca Fong MD   NOVOLOG FLEXPEN 100 UNIT/ML solution pen-injector sc pen INJECT 60 UNITS BEFORE MEALS 3 TIMES A DAY  Patient taking differently: Inject 60 Units under the skin into the appropriate area as directed 3 (Three) Times a Day With Meals. 4/6/20   Baldemar Melendez MD   ondansetron (ZOFRAN) 8 MG tablet TAKE 1 TABLET BY MOUTH EVERY 8 (EIGHT) HOURS AS NEEDED FOR NAUSEA OR VOMITING. 1/4/21   Ferguson, Kimberly Ventura, APRN  "  pantoprazole (PROTONIX) 20 MG EC tablet TAKE 1 TABLET BY MOUTH EVERY DAY  Patient taking differently: Take 20 mg by mouth Daily. 12/23/20   Geri Baig MD   Syringe/Needle, Disp, (Luer Lock Safety Syringes) 25G X 5/8\" 3 ML misc 1 each Daily. 1 Q28 DAYS 4/16/20   Ferguson, BEBE Luu   traMADol (ULTRAM) 50 MG tablet Take 1 tablet by mouth Every 6 (Six) Hours As Needed for Moderate Pain  (pain). 3/2/21   Ignacio Lord DPM   venlafaxine XR (EFFEXOR-XR) 150 MG 24 hr capsule TAKE 1 CAPSULE BY MOUTH TWICE A DAY 3/8/21   Ferguson, BEBE Luu   vitamin D (ERGOCALCIFEROL) 1.25 MG (03908 UT) capsule capsule TAKE ONE CAPSULE BY MOUTH EVERY SUNDAY.  Patient taking differently: Take 50,000 Units by mouth Every 7 (Seven) Days. 8/24/20   Ferguson, BEBE Luu       ALLERGIES:  Seroquel [quetiapine], Avandia [rosiglitazone], Morphine and related, and Oxycodone    REVIEW OF SYSTEMS  Review of Systems   Constitutional: Negative for chills and fever.   HENT: Negative for facial swelling and trouble swallowing.    Eyes: Negative for photophobia and visual disturbance.   Respiratory: Positive for shortness of breath. Negative for apnea, cough and chest tightness.    Cardiovascular: Positive for leg swelling. Negative for chest pain.        Left leg swelling   Gastrointestinal: Negative for abdominal distention, abdominal pain, constipation, diarrhea, nausea and vomiting.   Genitourinary: Negative for pelvic pain.   Musculoskeletal: Positive for gait problem. Negative for arthralgias and myalgias.   Neurological: Negative for dizziness, seizures, speech difficulty and weakness.   Psychiatric/Behavioral: Negative for confusion and hallucinations.       PHYSICAL EXAM:  Temp:  [98.1 °F (36.7 °C)-98.8 °F (37.1 °C)] 98.2 °F (36.8 °C)  Heart Rate:  [] 103  Resp:  [18] 18  BP: (120-145)/(54-70) 145/66  Body mass index is 42.1 kg/m².  Physical Exam  Constitutional:       Appearance: She is well-developed.   HENT: " "     Head: Normocephalic and atraumatic.      Nose: Nose normal.   Eyes:      Conjunctiva/sclera: Conjunctivae normal.      Pupils: Pupils are equal, round, and reactive to light.   Cardiovascular:      Rate and Rhythm: Normal rate and regular rhythm.      Pulses: Normal pulses.      Heart sounds: Normal heart sounds.   Pulmonary:      Effort: Pulmonary effort is normal. No respiratory distress.      Breath sounds: Normal breath sounds.   Abdominal:      General: Abdomen is flat. Bowel sounds are normal. There is no distension.      Palpations: Abdomen is soft.   Musculoskeletal:         General: Normal range of motion.      Cervical back: Normal range of motion and neck supple.      Left lower leg: Edema present.   Skin:     General: Skin is warm and dry.   Neurological:      General: No focal deficit present.      Mental Status: She is alert and oriented to person, place, and time. Mental status is at baseline.      Cranial Nerves: No cranial nerve deficit.   Psychiatric:         Mood and Affect: Mood normal.         Behavior: Behavior normal.         DIAGNOSTIC DATA:   Lab Results (last 24 hours)     Procedure Component Value Units Date/Time    Procalcitonin [[id number]]  (Abnormal) Collected: 03/09/21 1839    Specimen: Blood Updated: 03/10/21 0022     Procalcitonin 0.53 ng/mL     Narrative:      As a Marker for Sepsis (Non-Neonates):   1. <0.5 ng/mL represents a low risk of severe sepsis and/or septic shock.  1. >2 ng/mL represents a high risk of severe sepsis and/or septic shock.    As a Marker for Lower Respiratory Tract Infections that require antibiotic therapy:  PCT on Admission     Antibiotic Therapy             6-12 Hrs later  > 0.5                Strongly Recommended            >0.25 - <0.5         Recommended  0.1 - 0.25           Discouraged                   Remeasure/reassess PCT  <0.1                 Strongly Discouraged          Remeasure/reassess PCT      As 28 day mortality risk marker: \"Change in " "Procalcitonin Result\" (> 80 % or <=80 %) if Day 0 (or Day 1) and Day 4 values are available. Refer to http://www.[facility]-pct-calculator.com/   Change in PCT <=80 %   A decrease of PCT levels below or equal to 80 % defines a positive change in PCT test result representing a higher risk for 28-day all-cause mortality of patients diagnosed with severe sepsis or septic shock.  Change in PCT > 80 %   A decrease of PCT levels of more than 80 % defines a negative change in PCT result representing a lower risk for 28-day all-cause mortality of patients diagnosed with severe sepsis or septic shock.                Results may be falsely decreased if patient taking Biotin.     POC Glucose Once [[id number]]  (Abnormal) Collected: 03/09/21 2225    Specimen: Blood Updated: 03/09/21 2237     Glucose 295 mg/dL      Comment: RN NotifiedOperator: [id number] PEGGY Garcia ID: [id number]       Extra Tubes [[id number]] Collected: 03/09/21 1938    Specimen: Blood, Venous Line Updated: 03/09/21 2045    Narrative:      The following orders were created for panel order Extra Tubes.  Procedure                               Abnormality         Status                     ---------                               -----------         ------                     Gold Top - SST[[id number]]                                   Final result                 Please view results for these tests on the individual orders.    Gold Top - SST [[id number]] Collected: 03/09/21 1938    Specimen: Blood Updated: 03/09/21 2045     Extra Tube Hold for add-ons.     Comment: Auto resulted.       Extra Tubes [[id number]] Collected: 03/09/21 1912    Specimen: Blood, Venous Line Updated: 03/09/21 2015    Narrative:      The following orders were created for panel order Extra Tubes.  Procedure                               Abnormality         Status                     ---------                               -----------         ------                     Light Blue " Top[[id number]]                                   Final result                 Please view results for these tests on the individual orders.    Light Blue Top [[id number]] Collected: 03/09/21 1912    Specimen: Blood Updated: 03/09/21 2015     Extra Tube hold for add-on     Comment: Auto resulted       COVID-19 and FLU A/B PCR - Swab, Nasopharynx [[id number]]  (Normal) Collected: 03/09/21 1922    Specimen: Swab from Nasopharynx Updated: 03/09/21 2003     COVID19 Not Detected     Influenza A PCR Not Detected     Influenza B PCR Not Detected    Narrative:      Fact sheet for providers: https://www.fda.gov/media/998107/download    Fact sheet for patients: https://www.fda.gov/media/466069/download    Test performed by PCR.    Lactic Acid, Plasma [[id number]]  (Abnormal) Collected: 03/09/21 1931    Specimen: Blood Updated: 03/09/21 2000     Lactate 2.1 mmol/L     Blood Culture - Blood, Blood, Venous Line [[id number]] Collected: 03/09/21 1931    Specimen: Blood, Venous Line Updated: 03/09/21 1942    Blood Culture - Blood, Blood, Venous Line [[id number]] Collected: 03/09/21 1931    Specimen: Blood, Venous Line Updated: 03/09/21 1939    Comprehensive Metabolic Panel [[id number]]  (Abnormal) Collected: 03/09/21 1839    Specimen: Blood Updated: 03/09/21 1909     Glucose 290 mg/dL      BUN 12 mg/dL      Creatinine 1.09 mg/dL      Sodium 133 mmol/L      Potassium 4.9 mmol/L      Comment: Slight hemolysis detected by analyzer. Results may be affected.        Chloride 95 mmol/L      CO2 25.0 mmol/L      Calcium 8.7 mg/dL      Total Protein 6.9 g/dL      Albumin 3.20 g/dL      ALT (SGPT) 28 U/L      AST (SGOT) 27 U/L      Alkaline Phosphatase 138 U/L      Total Bilirubin 1.2 mg/dL      eGFR Non African Amer 51 mL/min/1.73      Globulin 3.7 gm/dL      A/G Ratio 0.9 g/dL      BUN/Creatinine Ratio 11.0     Anion Gap 13.0 mmol/L     Narrative:      GFR Normal >60  Chronic Kidney Disease <60  Kidney Failure <15      Troponin [[id number]]   (Normal) Collected: 03/09/21 1839    Specimen: Blood Updated: 03/09/21 1904     Troponin T <0.010 ng/mL     Narrative:      Troponin T Reference Range:  <= 0.03 ng/mL-   Negative for AMI  >0.03 ng/mL-     Abnormal for myocardial necrosis.  Clinicians would have to utilize clinical acumen, EKG, Troponin and serial changes to determine if it is an Acute Myocardial Infarction or myocardial injury due to an underlying chronic condition.       Results may be falsely decreased if patient taking Biotin.      BNP [[id number]]  (Normal) Collected: 03/09/21 1839    Specimen: Blood Updated: 03/09/21 1903     proBNP 714.7 pg/mL     Narrative:      Among patients with dyspnea, NT-proBNP is highly sensitive for the detection of acute congestive heart failure. In addition NT-proBNP of <300 pg/ml effectively rules out acute congestive heart failure with 99% negative predictive value.    Results may be falsely decreased if patient taking Biotin.      CBC & Differential [[id number]]  (Abnormal) Collected: 03/09/21 1839    Specimen: Blood Updated: 03/09/21 1850    Narrative:      The following orders were created for panel order CBC & Differential.  Procedure                               Abnormality         Status                     ---------                               -----------         ------                     CBC Auto Differential[[id number]]        Abnormal            Final result                 Please view results for these tests on the individual orders.    CBC Auto Differential [[id number]]  (Abnormal) Collected: 03/09/21 1839    Specimen: Blood Updated: 03/09/21 1850     WBC 22.39 10*3/mm3      RBC 3.98 10*6/mm3      Hemoglobin 12.1 g/dL      Hematocrit 35.6 %      MCV 89.4 fL      MCH 30.4 pg      MCHC 34.0 g/dL      RDW 13.2 %      RDW-SD 42.8 fl      MPV 9.3 fL      Platelets 432 10*3/mm3      Neutrophil % 81.7 %      Lymphocyte % 5.6 %      Monocyte % 11.1 %      Eosinophil % 0.2 %      Basophil % 0.3 %      Immature  Grans % 1.1 %      Neutrophils, Absolute 18.28 10*3/mm3      Lymphocytes, Absolute 1.26 10*3/mm3      Monocytes, Absolute 2.49 10*3/mm3      Eosinophils, Absolute 0.05 10*3/mm3      Basophils, Absolute 0.07 10*3/mm3      Immature Grans, Absolute 0.24 10*3/mm3      nRBC 0.0 /100 WBC            Imaging Results (Last 24 Hours)     Procedure Component Value Units Date/Time    CT Angiogram Chest [[id number]] Collected: 03/09/21 2001     Updated: 03/09/21 2056    Narrative:      PROCEDURE: CT ANGIOGRAM CHEST      .        EXAM:  Computed Tomography with CTA         REGION:  Chest         INDICATION:   Chest pain, tachycardia, cellulitis of lower  extremity, sepsis  - rule out pulmonary embolism         CORRELATIVE IMAGING:  None                          TECHNIQUE:             - PE / vascular protocol               - reconstructions:  axial, coronal, sagittal, obliques     - computer-generated 3D reconstructions (MIPS) were performed.                - contrast:  intravenous 90 mL of Isovue-370                         This exam was performed according to our departmental  dose-optimization program, which includes automated exposure  control, adjustment of the mA and/or kV according to patient size  and/or use of iterative reconstruction technique.  DLP is 713.0              COMMENTS:                               - Pulmonary arterial system:      - Main pulmonary artery trunk:  negative      - Left, right main pulmonary arteries: negative      - Lobar arteries: negative      - Segmental arteries: Limited evaluation of segmental  branches due to contrast phase       - Systemic vascularity (as visualized):        - Aorta: Normal caliber. Atherosclerotic vascular  calcification      - roots of great vessels: Normal caliber.      - SVC / IVC:  grossly negative / normal caliber         - Misc (limited visualization):      - pulmonary parenchyma:  10 year to band like opacities in  the lower lobes bilaterally, likely representing  atelectasis or  scarring. Right hemidiaphragm is elevated      - pleura:  negative      - mediastinal / maria guadalupe:  negative      - neck, inferior:  grossly wnl      - subdiaphragmatic structures: The liver appears prominent.  There is pneumobilia, probably secondary to sphincterotomy. The  gallbladder is surgically absent  - osseous:  No acute abnormality identified            .      Impression:      1.  No evidence of pulmonary embolism, though there is limited  evaluation of segmental branches due to contrast phase.          2.  No evidence of acute pathology associated with the visualized  aorta.      3. Linear to bandlike opacities in the upper lung zones probably  represent atelectasis and/or scarring  4. Liver appears prominent, but is incompletely included in the  study.  5. Pneumobilia, probably secondary to cholecystectomy and  sphincterotomy.    Electronically signed by:  Janki Molina MD  3/9/2021 8:54 PM CST  Workstation: [id number]    US Venous Doppler Lower Extremity Left (duplex) [[id number]] Collected: 03/09/21 1743     Updated: 03/09/21 1752    Narrative:      Doppler duplex venous examination left lower extremity.     CLINICAL INDICATION: Left leg pain.          COMPARISON: None    FINDINGS:    The common femoral,  femoral, and popliteal veins of the left      lower extremity are well identified and compress normally.   Doppler signals are heard either spontaneously or on distal  compression.  No evidence of intraluminal thrombus was noted.     The visualized posterior calf veins are normal.      Impression:      No evidence of deep venous thrombosis in the common  femoral, femoral, or popliteal veins of the left     lower  extremity.    Electronically signed by:  Naveed Taylor MD  3/9/2021 5:49 PM CST  Workstation: [id number]            I reviewed the patient's new clinical results.    ASSESSMENT AND PLAN: This is a 61 y.o. female with:    Active Hospital Problems    Diagnosis  POA   • **Sepsis (CMS/HCC)  [A41.9]  Unknown     Priority: High     Results from last 7 days   Lab Units 03/09/21  1931 03/09/21  1839   WBC 10*3/mm3  --  22.39*   LACTATE mmol/L 2.1*  --    -Sepsis secondary to left lower extremity cellulitis  -Given 1 L bolus in the ED  -Reflex lactate pending  -Vanc and Zosyn  -We will obtain urinalysis with cultures  -We will obtain blood culture  -We will obtain pro-Carlito       • Cellulitis of left lower extremity [L03.116]  Yes     Priority: High     -CTA chest shows no evidence of pulmonary embolism per radiologist interpretation  -Duplex venous ultrasound showed no evidence of DVT per radiology interpretation  -Vanc and Zosyn  -Dr. Lord (podiatry) on board will follow patient     • CHON (acute kidney injury) (CMS/[facility]) [N17.9]  Unknown     Priority: Medium     -Baseline creatinine 1.79  -Creatinine today is 1.09 with a GFR of 51  -IV fluids     • CAD (coronary artery disease) [I25.10]  Yes     -Continue metoprolol and Plavix     • Hypertension [I10]  Yes     -Continue metoprolol, hydrochlorothiazide, Lasix     • Depression, major, recurrent, moderate (CMS/HCC) [F33.1]  Yes     Continue home Effexor, Remeron, Ativan, Abilify     • 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, with long-term current use of insulin (CMS/[facility]) [E11.49, E11.65, Z79.4]  Not Applicable     -Continue 60 units Levemir nightly  -Sliding scale insulin for additional coverage         DVT prophylaxis: Heparin     Arianajudy Martinez and I have discussed pain goals for this hospitalization after reviewing her current clinical condition, medical history and prior pain experiences.  The goal is to keep the pain level 2-5/10.  To help achieve this, I plan to use prn tylenol.    LESTER reviewed via PDMP and consistent with patient reported medications.    Expected Length of Stay: home in 0-1 days    I discussed the patient's findings and my recommendations with patient.     Tirso Herrera MD is the attending on  record at time of admission, He is aware of the patient's status and agrees with the above history and physical.              This document has been electronically signed by Kiara Whitt MD on March 10, 2021 04:35 CST          Electronically signed by Tirso Herrera MD at 03/10/21 1135         Current Facility-Administered Medications   Medication Dose Route Frequency Provider Last Rate Last Admin   • acetaminophen (TYLENOL) tablet 650 mg  650 mg Oral Q4H PRN Kiara Whitt MD   650 mg at 03/10/21 2111   • ampicillin-sulbactam 3 g/100 mL 0.9% NS (MBP)  3 g Intravenous Q6H Peyton Sharma MD       • ARIPiprazole (ABILIFY) tablet 15 mg  15 mg Oral Daily Kiara Whitt MD   15 mg at 03/11/21 0851   • aspirin EC tablet 81 mg  81 mg Oral Daily Kiara Whitt MD   81 mg at 03/11/21 0851   • atorvastatin (LIPITOR) tablet 80 mg  80 mg Oral Daily Kiara Whitt MD   80 mg at 03/11/21 0851   • bisacodyl (DULCOLAX) EC tablet 5 mg  5 mg Oral Daily PRN Kiara Whitt MD       • clopidogrel (PLAVIX) tablet 75 mg  75 mg Oral Daily Kiara Whitt MD   75 mg at 03/11/21 0851   • dextrose (D50W) 25 g/ 50mL Intravenous Solution 25 g  25 g Intravenous Q15 Min PRN Kiara Whitt MD       • dextrose (D50W) 25 g/ 50mL Intravenous Solution 25 g  25 g Intravenous Q15 Min PRN Peyton Sharma MD       • dextrose (GLUTOSE) oral gel 15 g  15 g Oral Q15 Min PRN Kiara Whitt MD       • enoxaparin (LOVENOX) syringe 40 mg  40 mg Subcutaneous Q24H Seb Troncoso MD   40 mg at 03/11/21 1140   • furosemide (LASIX) tablet 40 mg  40 mg Oral Daily Kiara Whitt MD   40 mg at 03/11/21 0851   • gabapentin (NEURONTIN) capsule 400 mg  400 mg Oral TID Kiara Whitt MD   400 mg at 03/11/21 0851   • glucagon (human recombinant) (GLUCAGEN DIAGNOSTIC) injection 1 mg  1 mg Subcutaneous Q15 Min PRN Kiara Whitt MD       • guaiFENesin-dextromethorphan (ROBITUSSIN DM) 100-10 MG/5ML syrup 5 mL  5 mL Oral Q4H PRN  Kiara Whitt MD       • HYDROcodone-acetaminophen (NORCO) 7.5-325 MG per tablet 1 tablet  1 tablet Oral Q6H PRN Peyton Sharma MD   1 tablet at 03/10/21 0924   • ibuprofen (ADVIL,MOTRIN) tablet 500 mg  500 mg Oral Q6H PRN Peyton Sharma MD   500 mg at 03/11/21 0309   • insulin aspart (novoLOG) injection 0-7 Units  0-7 Units Subcutaneous TID AC Kiara Whitt MD   3 Units at 03/11/21 0859   • insulin detemir (LEVEMIR) injection 60 Units  60 Units Subcutaneous Nightly Kiara Whitt MD   60 Units at 03/10/21 2104   • LORazepam (ATIVAN) tablet 0.5 mg  0.5 mg Oral Daily PRN Kiara Whitt MD       • meclizine (ANTIVERT) tablet 25 mg  25 mg Oral TID PRN Kiara Whitt MD       • melatonin tablet 5.25 mg  5.25 mg Oral Nightly PRN Kiara Whitt MD       • metoclopramide (REGLAN) tablet 10 mg  10 mg Oral 4x Daily PRN Kiara Whitt MD       • metoprolol succinate XL (TOPROL-XL) 24 hr tablet 25 mg  25 mg Oral Daily Kiara Whitt MD   25 mg at 03/11/21 0851   • mirtazapine (REMERON) tablet 45 mg  45 mg Oral Nightly Kiara Whitt MD   45 mg at 03/10/21 2058   • ondansetron (ZOFRAN) injection 4 mg  4 mg Intravenous Q6H PRN Kiara Whitt MD       • ondansetron (ZOFRAN) tablet 8 mg  8 mg Oral Q8H PRN Kiara Whitt MD   8 mg at 03/11/21 1045   • pantoprazole (PROTONIX) EC tablet 20 mg  20 mg Oral Daily Kiara Whitt MD   20 mg at 03/11/21 0851   • sodium chloride 0.9 % flush 10 mL  10 mL Intravenous PRN Kiara Whitt MD       • sodium chloride 0.9 % flush 10 mL  10 mL Intravenous Q12H Kiara Whitt MD   10 mL at 03/11/21 0850   • sodium chloride 0.9 % flush 10 mL  10 mL Intravenous PRN Kiara Whitt MD       • sodium chloride 0.9 % infusion  100 mL/hr Intravenous Continuous Peyton Sharma  mL/hr at 03/11/21 0850 100 mL/hr at 03/11/21 0850   • sodium chloride 0.9% - IBW for BMI > 30 bolus 1,917 mL  30 mL/kg (Ideal) Intravenous Once Kiara Whitt MD   Stopped at  21 2316   • traMADol (ULTRAM) tablet 50 mg  50 mg Oral Q6H PRN Kiara Whitt MD       • venlafaxine XR (EFFEXOR-XR) 24 hr capsule 150 mg  150 mg Oral Daily With Breakfast Kiara Whitt MD   150 mg at 21 0851     Operative/Procedure Notes (most recent note)    No notes of this type exist for this encounter.            Physician Progress Notes (most recent note)      Seb Troncoso MD at 21 0712              FAMILY MEDICINE DAILY PROGRESS NOTE    NAME: Ariana Martinez  : 1960  MRN: [id number]      LOS: 2 days     PROVIDER OF SERVICE: Seb Troncoso MD    Chief Complaint: Staphylococcus aureus bacteremia with sepsis (CMS/[facility])    Subjective:     Interval History:  History taken from: patient chart  Patient Complaints: Foot pain; stable chest pain; discomfort from the catheter  Blood cultures growing staph aureus and gram-negative bacteria.  CT left lower extremity planned for today. CT concerning for septic joint with worsening changes from prior imaging.       Review of Systems:   Review of Systems   Constitutional: Positive for fever. Negative for diaphoresis.   HENT: Negative for congestion and sore throat.    Eyes: Negative for pain and redness.   Respiratory: Negative for cough and shortness of breath.    Cardiovascular: Positive for chest pain. Negative for palpitations.   Gastrointestinal: Negative for abdominal pain and diarrhea.   Endocrine: Negative for polydipsia and polyphagia.   Genitourinary: Positive for difficulty urinating. Negative for flank pain.   Musculoskeletal: Positive for arthralgias and myalgias.   Neurological: Negative for dizziness and light-headedness.   Psychiatric/Behavioral: Negative for agitation and confusion.       Objective:     Vital Signs  Temp:  [97 °F (36.1 °C)-102.7 °F (39.3 °C)] 97 °F (36.1 °C)  Heart Rate:  [] 95  Resp:  [18-20] 18  BP: (117-142)/(54-78) 134/62   Body mass index is 43.12 kg/m².    Physical Exam  Physical  Exam  Vitals and nursing note reviewed.   Constitutional:       General: She is not in acute distress.     Appearance: She is well-developed. She is obese. She is ill-appearing.   HENT:      Head: Normocephalic and atraumatic.   Eyes:      Conjunctiva/sclera: Conjunctivae normal.   Neck:      Vascular: No JVD.   Cardiovascular:      Rate and Rhythm: Normal rate and regular rhythm.      Heart sounds: Normal heart sounds. No murmur. No friction rub. No gallop.    Pulmonary:      Effort: Pulmonary effort is normal.      Breath sounds: Normal breath sounds. No wheezing or rales.   Abdominal:      General: Bowel sounds are normal. There is no distension.      Palpations: Abdomen is soft.      Tenderness: There is no abdominal tenderness.   Skin:     General: Skin is warm and dry.      Capillary Refill: Capillary refill takes less than 2 seconds.      Comments: Left lower extremity wrapping in place   Neurological:      Mental Status: She is alert and oriented to person, place, and time.         Scheduled Meds   Pharmacy to dose vancomycin, , Does not apply, Once  ARIPiprazole, 15 mg, Oral, Daily  aspirin, 81 mg, Oral, Daily  atorvastatin, 80 mg, Oral, Daily  clopidogrel, 75 mg, Oral, Daily  furosemide, 40 mg, Oral, Daily  gabapentin, 400 mg, Oral, TID  insulin aspart, 0-7 Units, Subcutaneous, TID AC  insulin detemir, 60 Units, Subcutaneous, Nightly  metoprolol succinate XL, 25 mg, Oral, Daily  mirtazapine, 45 mg, Oral, Nightly  pantoprazole, 20 mg, Oral, Daily  piperacillin-tazobactam, 3.375 g, Intravenous, Q6H  sodium chloride, 10 mL, Intravenous, Q12H  sodium chloride 0.9% - IBW for BMI > 30, 30 mL/kg (Ideal), Intravenous, Once  vancomycin, 1,500 mg, Intravenous, Q12H  venlafaxine XR, 150 mg, Oral, Daily With Breakfast       PRN Meds   acetaminophen  •  bisacodyl  •  dextrose  •  dextrose  •  dextrose  •  glucagon (human recombinant)  •  guaiFENesin-dextromethorphan  •  HYDROcodone-acetaminophen  •  ibuprofen  •   LORazepam  •  meclizine  •  melatonin  •  metoclopramide  •  ondansetron  •  ondansetron  •  [COMPLETED] Insert peripheral IV **AND** sodium chloride  •  sodium chloride  •  traMADol      Diagnostic Data    Results from last 7 days   Lab Units 03/11/21  0259   WBC 10*3/mm3 13.74*   HEMOGLOBIN g/dL 10.2*   HEMATOCRIT % 30.6*   PLATELETS 10*3/mm3 354   GLUCOSE mg/dL 237*   CREATININE mg/dL 1.04*   BUN mg/dL 13   SODIUM mmol/L 132*   POTASSIUM mmol/L 3.5   AST (SGOT) U/L 11   ALT (SGPT) U/L 13   ALK PHOS U/L 114   BILIRUBIN mg/dL 0.6   ANION GAP mmol/L 8.0       US Guided Vascular Access    Result Date: 3/10/2021  Midline placement right cephalic vein. Vascular access device placed under ultrasound guidance as described above Electronically signed by:  Vamshi Rose MD  3/10/2021 5:03 PM CST Workstation: [id number]    US Venous Doppler Lower Extremity Left (duplex)    Result Date: 3/9/2021  No evidence of deep venous thrombosis in the common femoral, femoral, or popliteal veins of the left     lower extremity. Electronically signed by:  Naveed Taylor MD  3/9/2021 5:49 PM CST Workstation: [id number]    CT Angiogram Chest    Result Date: 3/9/2021  1.  No evidence of pulmonary embolism, though there is limited evaluation of segmental branches due to contrast phase.        2.  No evidence of acute pathology associated with the visualized aorta.    3. Linear to bandlike opacities in the upper lung zones probably represent atelectasis and/or scarring 4. Liver appears prominent, but is incompletely included in the study. 5. Pneumobilia, probably secondary to cholecystectomy and sphincterotomy. Electronically signed by:  Janki Molina MD  3/9/2021 8:54 PM CST Workstation: [id number]    CT Lower Extremity Left Without Contrast    Result Date: 3/10/2021  1. Slightly increased fragmentation of the talus and distal tibia previous study, including minimal destructive changes of the anterior aspect of the distal tibia. 2. Increasing  joint fluid compared to the previous study, now with multiple tiny locules of gas, concerning for infection of joint fluid. 3. Slightly increasing fluid collection posterior to the tibiotalar joint, surrounding the Achilles tendon. 4. The posterior most calcaneal screw traversing the subtalar joint appears to have broken or crack since the previous study, though it is nondisplaced. [facility] [place] requested to telephone these results to licensed caregiver immediately at 6:18 PM EST on 3/16/2021. Electronically signed by:  Janki Molina MD  3/10/2021 5:19 PM CST Workstation: [id number]    I reviewed the patient's new clinical results.    Assessment/Plan:     Active Hospital Problems    Diagnosis  POA   • **Staphylococcus aureus bacteremia with sepsis (CMS/HCC) [A41.01]  Yes     Priority: High     Sepsis secondary to left lower extremity cellulitis. Given 1 L bolus in the ED. Procal elevated. S. Aureus growing on blood cultures.  Gram-negative also growing on blood cultures.  -Continue Vanco and Zosyn and adjust as necessary when sensitivities and culture further develops  -Echo ordered to assess for vegetations given staph bacteremia       • Cellulitis of left lower extremity [L03.116]  Yes     Priority: High     CTA chest shows no evidence of pulmonary embolism per radiologist interpretation. Duplex venous ultrasound showed no evidence of DVT per radiology interpretation.   -Vanc and Zosyn  -Dr. Lord (podiatry) on board will follow patient     • CAD (coronary artery disease) [I25.10]  Yes     -Continue metoprolol and Plavix     • Hypertension [I10]  Yes     -Continue metoprolol, hydrochlorothiazide, Lasix     • (HFpEF) heart failure with preserved ejection fraction (CMS/HCC) [I50.30]  Yes     -Continue Lasix, metoprolol, atorvastatin  -Consider adding ACE/ARB once sepsis and CHON resolves     • Depression, major, recurrent, moderate (CMS/HCC) [F33.1]  Yes     Continue home Effexor, Remeron, Ativan, Abilify     • Uncontrolled  type 2 diabetes mellitus with neurologic complication, with long-term current use of insulin (CMS/[facility]) [E11.49, E11.65, Z79.4]  Not Applicable     -Continue 60 units Levemir nightly  -Sliding scale insulin for additional coverage         DVT prophylaxis: Lovenox  Code status is   Code Status and Medical Interventions:   Ordered at: 214     Code Status:    CPR     Medical Interventions (Level of Support Prior to Arrest):    Full       Plan for disposition:>72 hours      Time: 18 min     This document has been electronically signed by Seb Troncoso MD on 2021 07:21 CST        Electronically signed by Seb Troncoso MD at 21 0721       Medical Student Notes (most recent note)    No notes of this type exist for this encounter.            Consult Notes (most recent note)      Ignacio Lord DPM at 03/10/21 1600      Consult Orders    1. Foot / Ankle (on-call MD unless specified) [[id number]] ordered by Kiara Whitt MD                 [facility]   Consult Note    Patient Name: Ariana Martinez  : 1960  MRN: [id number]  Primary Care Physician: Kimberly Ferguson APRN  Referring Physician: True Crocker MD  Date of admission: 3/9/2021    Foot / Ankle (on-call MD unless specified)  Consult performed by: Ignacio Lord DPM  Consult ordered by: Kiara Whitt MD  Reason for consult: Left ankle cellulitis        Subjective   Subjective     Reason for Consult/ Chief Complaint: Left ankle pain, cellulitis    Ariana Martinez is a 61 y.o. female with history of diabetes and complicated history of left ankle surgery.  Patient previously underwent subtalar joint and talonavicular joint arthrodesis with delayed union of the subtalar joint.  Patient did have hardware loosening which created injury to the talar dome.  Approximately 2 weeks ago she underwent partial hardware removal and ankle exostectomy.  She did progressively note increased swelling redness and calf  pain.  She was sent to the emergency department yesterday after having a D-dimer of 4000 in the clinic setting.  Her venous ultrasound was negative for DVT but she did display a white count of 22,000.  She is admitted for cellulitis.    Leg Swelling  This is a new problem. The current episode started 1 to 4 weeks ago. The problem occurs constantly. The problem has been gradually worsening. Associated symptoms include arthralgias and chills. The symptoms are aggravated by walking and standing. She has tried nothing for the symptoms. The treatment provided no relief.       Review of Systems   Constitutional: Positive for chills.   HENT: Negative.    Respiratory: Negative.    Cardiovascular: Positive for leg swelling.   Musculoskeletal: Positive for arthralgias.   Skin: Positive for color change.   Neurological: Negative.    Psychiatric/Behavioral: Negative.         Personal History     Past Medical History:   Diagnosis Date   • Angina, class IV (CMS/HCC)    • Anxiety    • Anxiety and depression    • Arthritis    • Benign paroxysmal positional vertigo    • Bladder disorder     has bladder stimulator   • Carpal tunnel syndrome    • CHF (congestive heart failure) (CMS/[facility])    • Chronic pain    • Coronary atherosclerosis     hx CABG 2005.  is followed by Dr Kwon   • Depression    • Diabetes mellitus (CMS/HCC)     Type 2, controlled   • Diabetic polyneuropathy (CMS/HCC)    • Disease of thyroid gland    • Elevated cholesterol    • Female stress incontinence    • Foot pain, left    • Full dentures    • Gastroparesis    • GERD (gastroesophageal reflux disease)    • Hyperlipidemia    • Hypertension    • Low back pain    • Malaise and fatigue    • Multiple joint pain    • Obesity     Refuses to be weighed   • Otalgia     Both   • Perforation of tympanic membrane     Left   • Postoperative wound infection    • Vitamin D deficiency    • Wears glasses     reading       Past Surgical History:   Procedure Laterality Date   •  ABDOMINAL SURGERY     • ANGIOPLASTY      coronary   • ANKLE ARTHROSCOPY Left 2/26/2021    Procedure: Left foot hardware removal, ankle arthroscopy, bone grafting , left foot exostectomy;  Surgeon: Ignacio Lord DPM;  Location: [facility] OR;  Service: Podiatry;  Laterality: Left;   • BREAST BIOPSY Right    • CARDIAC CATHETERIZATION     • CARDIAC CATHETERIZATION N/A 6/20/2017    Procedure: Right Heart Cath;  Surgeon: Can Kwon MD PhD;  Location: [facility] CATH INVASIVE LOCATION;  Service:    • CARDIAC CATHETERIZATION N/A 2/18/2020    Procedure: Left Heart Cath;  Surgeon: Catalina Cooper MD;  Location: [facility] CATH INVASIVE LOCATION;  Service: Cardiology;  Laterality: N/A;   • CARPAL TUNNEL RELEASE     • CHOLECYSTECTOMY     • COLONOSCOPY N/A 6/24/2020    Procedure: COLONOSCOPY;  Surgeon: Julián Maldonado MD;  Location: [facility] ENDOSCOPY;  Service: Gastroenterology;  Laterality: N/A;   • CORONARY ARTERY BYPASS GRAFT  2005   • ENDOSCOPY N/A 10/19/2018    Procedure: ESOPHAGOGASTRODUODENOSCOPY possible dilation;  Surgeon: Julián Maldonado MD;  Location: [facility] ENDOSCOPY;  Service: Gastroenterology   • ENDOSCOPY N/A 6/24/2020    Procedure: ESOPHAGOGASTRODUODENOSCOPY WED appt please;  Surgeon: Julián Maldonado MD;  Location: [facility] ENDOSCOPY;  Service: Gastroenterology;  Laterality: N/A;   • ENDOSCOPY AND COLONOSCOPY     • FOOT SURGERY      Toes   • FOOT SURGERY     • GASTRIC BANDING      Revision, laparoscopic   • HYSTERECTOMY     • MOUTH SURGERY     • SALPINGO OOPHORECTOMY     • SHOULDER SURGERY     • SUBTALAR ARTHRODESIS Left 1/16/2019    Procedure: LEFT FOOT HARDWARE REMOVAL, FIFTH METATARSAL , OPEN REDUCTION INTERNAL FIXATION, CALCANEAL OSTEOTOMY;  Surgeon: Ignacio Lord DPM;  Location: [facility] OR;  Service: Podiatry   • SUBTALAR ARTHRODESIS Left 10/16/2019    Procedure: foot hardware removal, subtalar joint fusion  possible de/reattachment of achilles tendon        (c-arm);  Surgeon: Pop  Ignacio MUNIZ DPM;  Location: [facility];  Service: Podiatry   • SUBTALAR ARTHRODESIS Left 9/30/2020    Procedure: subtalar, talonavicular joint arthrodesis.  Removal hardware.          (c-arm);  Surgeon: Ignacio Lord DPM;  Location: [facility];  Service: Podiatry;  Laterality: Left;   • TRANSESOPHAGEAL ECHOCARDIOGRAM (LAMONTE)      With color flow       Family History: family history includes Diabetes in her sister, sister, sister, sister, sister, sister and another family member; Heart disease in her mother, sister, sister, and another family member; Hypertension in her mother, sister, sister, and another family member; Stroke in her mother. Otherwise pertinent FHx was reviewed and not pertinent to current issue.    Social History:  reports that she has never smoked. She has never used smokeless tobacco. She reports that she does not drink alcohol and does not use drugs.    Home Medications:  ARIPiprazole, [employer], Calcium Citrate-Vitamin D, Glucagon Emergency, Insulin Glargine, Insulin Pen Needle, LORazepam, ONE TOUCH ULTRA MINI, Syringe/Needle (Disp), aspirin, atorvastatin, clopidogrel, cyanocobalamin, doxycycline, folic acid, furosemide, gabapentin, glucose blood, glucose monitor, hydroCHLOROthiazide, insulin aspart, meclizine, metoclopramide, metoprolol succinate XL, mirtazapine, ondansetron, pantoprazole, traMADol, venlafaxine XR, and vitamin D      Allergies:  Allergies   Allergen Reactions   • Seroquel [Quetiapine] Anaphylaxis   • Avandia [Rosiglitazone] Swelling   • Morphine And Related Hallucinations   • Oxycodone Hallucinations       Objective    Objective   Vitals:  Temp:  [98.1 °F (36.7 °C)-102.7 °F (39.3 °C)] 101.1 °F (38.4 °C)  Heart Rate:  [100-112] 112  Resp:  [18-20] 20  BP: (127-145)/(54-66) 142/54    Physical Exam  Constitutional:       Appearance: She is obese.   HENT:      Head: Normocephalic and atraumatic.   Eyes:      General: Scleral icterus present.   Cardiovascular:       Rate and Rhythm: Normal rate and regular rhythm.      Pulses:           Dorsalis pedis pulses are 1+ on the left side.        Posterior tibial pulses are 1+ on the left side.   Pulmonary:      Effort: Pulmonary effort is normal.      Breath sounds: Normal breath sounds.   Musculoskeletal:      Left lower leg: Edema present.      Left foot: Decreased range of motion.        Feet:    Feet:      Right foot:      Protective Sensation: 0 sites sensed.      Left foot:      Protective Sensation: 0 sites sensed.      Skin integrity: Erythema present.   Neurological:      Mental Status: She is alert.   Psychiatric:         Mood and Affect: Mood normal.         Behavior: Behavior normal.         Result Review    Result Review:  I have personally reviewed the results from the time of this admission to 3/10/2021 16:00 CST and agree with these findings:  [x]  Laboratory  [x]  Microbiology  []  Radiology  []  EKG/Telemetry   []  Cardiology/Vascular   []  Pathology  []  Old records  []  Other:      Assessment/Plan   Assessment / Plan     Brief Patient Summary:      Active Hospital Problems:  Active Hospital Problems    Diagnosis    • **Sepsis due to methicillin susceptible Staphylococcus aureus (MSSA) with acute renal failure without septic shock (CMS/[facility] [facility]      Sepsis secondary to left lower extremity cellulitis. Given 1 L bolus in the ED. Procal elevated.   -Nitin and Zosyn  -Urinalysis and blood culture pending       • Cellulitis of left lower extremity      CTA chest shows no evidence of pulmonary embolism per radiologist interpretation. Duplex venous ultrasound showed no evidence of DVT per radiology interpretation  -Vanc and Zosyn  -Dr. Lord (podiatry) on board will follow patient     • CAD (coronary artery disease)      -Continue metoprolol and Plavix     • Hypertension      -Continue metoprolol, hydrochlorothiazide, Lasix     • CHON (acute kidney injury) (CMS/[facility])      -Baseline creatinine 0.79  -Creatinine today is 1.09  with a GFR of 51  -IV fluids     • (HFpEF) heart failure with preserved ejection fraction (CMS/HCC)      -Continue Lasix, metoprolol, atorvastatin  -Consider adding ACE/ARB once sepsis resolves     • Depression, major, recurrent, moderate (CMS/HCC)      Continue home Effexor, Remeron, Ativan, Abilify     • 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, with long-term current use of insulin (CMS/HCC)      -Continue 60 units Levemir nightly  -Sliding scale insulin for additional coverage         Plan:   Dressing change at bedside.  Patient does have significant erythema, cellulitis predominantly to medial and posterior heel.  Previous culture from posterior heel growing staph and gram-negative bacilli.  Continue vancomycin and Zosyn for now.  Patient does also have a urine culture pending.  Will repeat left ankle CT to help rule out deeper abscess.  Continue to trend labs and consider removal of remaining hardware, irrigation debridement pending these results.  Will continue to follow    Electronically signed by Ignacio Lord DPM, 03/10/21, 4:00 PM CST.    Electronically signed by Ignacio Lord DPM at 03/10/21 3861

## 2021-03-12 ENCOUNTER — APPOINTMENT (OUTPATIENT)
Dept: GENERAL RADIOLOGY | Facility: HOSPITAL | Age: 61
End: 2021-03-12

## 2021-03-12 ENCOUNTER — ANESTHESIA (OUTPATIENT)
Dept: PERIOP | Facility: HOSPITAL | Age: 61
End: 2021-03-12

## 2021-03-12 LAB
ALBUMIN SERPL-MCNC: 2.7 G/DL (ref 3.5–5.2)
ALBUMIN/GLOB SERPL: 0.8 G/DL
ALP SERPL-CCNC: 122 U/L (ref 39–117)
ALT SERPL W P-5'-P-CCNC: 12 U/L (ref 1–33)
ANION GAP SERPL CALCULATED.3IONS-SCNC: 8 MMOL/L (ref 5–15)
AST SERPL-CCNC: 12 U/L (ref 1–32)
BACTERIA SPEC AEROBE CULT: ABNORMAL
BACTERIA SPEC AEROBE CULT: ABNORMAL
BASOPHILS # BLD AUTO: 0.06 10*3/MM3 (ref 0–0.2)
BASOPHILS NFR BLD AUTO: 0.6 % (ref 0–1.5)
BH CV ECHO MEAS - ACS: 1.6 CM
BH CV ECHO MEAS - AO MAX PG (FULL): 7.5 MMHG
BH CV ECHO MEAS - AO MAX PG: 13 MMHG
BH CV ECHO MEAS - AO MEAN PG (FULL): 4 MMHG
BH CV ECHO MEAS - AO MEAN PG: 7 MMHG
BH CV ECHO MEAS - AO ROOT AREA (BSA CORRECTED): 1.6
BH CV ECHO MEAS - AO ROOT AREA: 10.8 CM^2
BH CV ECHO MEAS - AO ROOT DIAM: 3.7 CM
BH CV ECHO MEAS - AO V2 MAX: 180 CM/SEC
BH CV ECHO MEAS - AO V2 MEAN: 125 CM/SEC
BH CV ECHO MEAS - AO V2 VTI: 34 CM
BH CV ECHO MEAS - AVA(I,A): 2.2 CM^2
BH CV ECHO MEAS - AVA(I,D): 2.2 CM^2
BH CV ECHO MEAS - AVA(V,A): 2.3 CM^2
BH CV ECHO MEAS - AVA(V,D): 2.3 CM^2
BH CV ECHO MEAS - BSA(HAYCOCK): 2.5 M^2
BH CV ECHO MEAS - BSA: 2.4 M^2
BH CV ECHO MEAS - BZI_BMI: 43 KILOGRAMS/M^2
BH CV ECHO MEAS - BZI_METRIC_HEIGHT: 172.7 CM
BH CV ECHO MEAS - BZI_METRIC_WEIGHT: 128.4 KG
BH CV ECHO MEAS - EDV(CUBED): 125.8 ML
BH CV ECHO MEAS - EDV(MOD-SP2): 131 ML
BH CV ECHO MEAS - EDV(MOD-SP4): 134 ML
BH CV ECHO MEAS - EDV(TEICH): 118.8 ML
BH CV ECHO MEAS - EF(CUBED): 58.4 %
BH CV ECHO MEAS - EF(MOD-SP2): 69.4 %
BH CV ECHO MEAS - EF(MOD-SP4): 62.1 %
BH CV ECHO MEAS - EF(TEICH): 49.8 %
BH CV ECHO MEAS - ESV(CUBED): 52.3 ML
BH CV ECHO MEAS - ESV(MOD-SP2): 40.1 ML
BH CV ECHO MEAS - ESV(MOD-SP4): 50.8 ML
BH CV ECHO MEAS - ESV(TEICH): 59.6 ML
BH CV ECHO MEAS - FS: 25.3 %
BH CV ECHO MEAS - IVS/LVPW: 0.92
BH CV ECHO MEAS - IVSD: 1.3 CM
BH CV ECHO MEAS - LA DIMENSION: 4.8 CM
BH CV ECHO MEAS - LA/AO: 1.3
BH CV ECHO MEAS - LV DIASTOLIC VOL/BSA (35-75): 56.6 ML/M^2
BH CV ECHO MEAS - LV MASS(C)D: 284.8 GRAMS
BH CV ECHO MEAS - LV MASS(C)DI: 120.2 GRAMS/M^2
BH CV ECHO MEAS - LV MAX PG: 5.5 MMHG
BH CV ECHO MEAS - LV MEAN PG: 3 MMHG
BH CV ECHO MEAS - LV SYSTOLIC VOL/BSA (12-30): 21.4 ML/M^2
BH CV ECHO MEAS - LV V1 MAX: 117 CM/SEC
BH CV ECHO MEAS - LV V1 MEAN: 75.9 CM/SEC
BH CV ECHO MEAS - LV V1 VTI: 21.8 CM
BH CV ECHO MEAS - LVIDD: 5 CM
BH CV ECHO MEAS - LVIDS: 3.7 CM
BH CV ECHO MEAS - LVLD AP2: 8.2 CM
BH CV ECHO MEAS - LVLD AP4: 7.9 CM
BH CV ECHO MEAS - LVLS AP2: 6.5 CM
BH CV ECHO MEAS - LVLS AP4: 6.9 CM
BH CV ECHO MEAS - LVOT AREA (M): 3.5 CM^2
BH CV ECHO MEAS - LVOT AREA: 3.5 CM^2
BH CV ECHO MEAS - LVOT DIAM: 2.1 CM
BH CV ECHO MEAS - LVPWD: 1.4 CM
BH CV ECHO MEAS - MR MAX PG: 75.3 MMHG
BH CV ECHO MEAS - MR MAX VEL: 434 CM/SEC
BH CV ECHO MEAS - MV A MAX VEL: 93.6 CM/SEC
BH CV ECHO MEAS - MV DEC SLOPE: 639 CM/SEC^2
BH CV ECHO MEAS - MV E MAX VEL: 135 CM/SEC
BH CV ECHO MEAS - MV E/A: 1.4
BH CV ECHO MEAS - MV MAX PG: 5.2 MMHG
BH CV ECHO MEAS - MV MEAN PG: 3 MMHG
BH CV ECHO MEAS - MV P1/2T MAX VEL: 121 CM/SEC
BH CV ECHO MEAS - MV P1/2T: 55.5 MSEC
BH CV ECHO MEAS - MV V2 MAX: 114 CM/SEC
BH CV ECHO MEAS - MV V2 MEAN: 83.2 CM/SEC
BH CV ECHO MEAS - MV V2 VTI: 25.8 CM
BH CV ECHO MEAS - MVA P1/2T LCG: 1.8 CM^2
BH CV ECHO MEAS - MVA(P1/2T): 4 CM^2
BH CV ECHO MEAS - MVA(VTI): 2.9 CM^2
BH CV ECHO MEAS - PA MAX PG: 3.3 MMHG
BH CV ECHO MEAS - PA V2 MAX: 90.9 CM/SEC
BH CV ECHO MEAS - RAP SYSTOLE: 5 MMHG
BH CV ECHO MEAS - RVDD: 3.2 CM
BH CV ECHO MEAS - RVSP: 33.3 MMHG
BH CV ECHO MEAS - SI(AO): 154.3 ML/M^2
BH CV ECHO MEAS - SI(CUBED): 31 ML/M^2
BH CV ECHO MEAS - SI(LVOT): 31.9 ML/M^2
BH CV ECHO MEAS - SI(MOD-SP2): 38.4 ML/M^2
BH CV ECHO MEAS - SI(MOD-SP4): 35.1 ML/M^2
BH CV ECHO MEAS - SI(TEICH): 25 ML/M^2
BH CV ECHO MEAS - SV(AO): 365.6 ML
BH CV ECHO MEAS - SV(CUBED): 73.4 ML
BH CV ECHO MEAS - SV(LVOT): 75.5 ML
BH CV ECHO MEAS - SV(MOD-SP2): 90.9 ML
BH CV ECHO MEAS - SV(MOD-SP4): 83.2 ML
BH CV ECHO MEAS - SV(TEICH): 59.2 ML
BH CV ECHO MEAS - TR MAX VEL: 266 CM/SEC
BILIRUB SERPL-MCNC: 0.5 MG/DL (ref 0–1.2)
BUN SERPL-MCNC: 10 MG/DL (ref 6–20)
BUN/CREAT SERPL: 8.6 (ref 7–25)
CALCIUM SPEC-SCNC: 8.7 MG/DL (ref 8.6–10.5)
CHLORIDE SERPL-SCNC: 104 MMOL/L (ref 98–107)
CO2 SERPL-SCNC: 25 MMOL/L (ref 22–29)
CREAT SERPL-MCNC: 1.16 MG/DL (ref 0.57–1)
CRP SERPL-MCNC: 26.73 MG/DL (ref 0–0.5)
DEPRECATED RDW RBC AUTO: 44 FL (ref 37–54)
EOSINOPHIL # BLD AUTO: 0.23 10*3/MM3 (ref 0–0.4)
EOSINOPHIL NFR BLD AUTO: 2.2 % (ref 0.3–6.2)
ERYTHROCYTE [DISTWIDTH] IN BLOOD BY AUTOMATED COUNT: 13.4 % (ref 12.3–15.4)
GFR SERPL CREATININE-BSD FRML MDRD: 48 ML/MIN/1.73
GLOBULIN UR ELPH-MCNC: 3.2 GM/DL
GLUCOSE BLDC GLUCOMTR-MCNC: 116 MG/DL (ref 70–130)
GLUCOSE BLDC GLUCOMTR-MCNC: 129 MG/DL (ref 70–130)
GLUCOSE BLDC GLUCOMTR-MCNC: 215 MG/DL (ref 70–130)
GLUCOSE BLDC GLUCOMTR-MCNC: 57 MG/DL (ref 70–130)
GLUCOSE BLDC GLUCOMTR-MCNC: 67 MG/DL (ref 70–130)
GLUCOSE BLDC GLUCOMTR-MCNC: 77 MG/DL (ref 70–130)
GLUCOSE SERPL-MCNC: 67 MG/DL (ref 65–99)
GRAM STN SPEC: ABNORMAL
HCT VFR BLD AUTO: 31 % (ref 34–46.6)
HGB BLD-MCNC: 10.4 G/DL (ref 12–15.9)
IMM GRANULOCYTES # BLD AUTO: 0.05 10*3/MM3 (ref 0–0.05)
IMM GRANULOCYTES NFR BLD AUTO: 0.5 % (ref 0–0.5)
ISOLATED FROM: ABNORMAL
ISOLATED FROM: ABNORMAL
LYMPHOCYTES # BLD AUTO: 1.12 10*3/MM3 (ref 0.7–3.1)
LYMPHOCYTES NFR BLD AUTO: 10.6 % (ref 19.6–45.3)
MAGNESIUM SERPL-MCNC: 1.8 MG/DL (ref 1.6–2.6)
MCH RBC QN AUTO: 30.2 PG (ref 26.6–33)
MCHC RBC AUTO-ENTMCNC: 33.5 G/DL (ref 31.5–35.7)
MCV RBC AUTO: 90.1 FL (ref 79–97)
MONOCYTES # BLD AUTO: 1.23 10*3/MM3 (ref 0.1–0.9)
MONOCYTES NFR BLD AUTO: 11.7 % (ref 5–12)
NEUTROPHILS NFR BLD AUTO: 7.86 10*3/MM3 (ref 1.7–7)
NEUTROPHILS NFR BLD AUTO: 74.4 % (ref 42.7–76)
NRBC BLD AUTO-RTO: 0 /100 WBC (ref 0–0.2)
PLATELET # BLD AUTO: 359 10*3/MM3 (ref 140–450)
PMV BLD AUTO: 9.5 FL (ref 6–12)
POTASSIUM SERPL-SCNC: 3.1 MMOL/L (ref 3.5–5.2)
PROT SERPL-MCNC: 5.9 G/DL (ref 6–8.5)
RBC # BLD AUTO: 3.44 10*6/MM3 (ref 3.77–5.28)
SODIUM SERPL-SCNC: 137 MMOL/L (ref 136–145)
WBC # BLD AUTO: 10.55 10*3/MM3 (ref 3.4–10.8)

## 2021-03-12 PROCEDURE — 80053 COMPREHEN METABOLIC PANEL: CPT | Performed by: STUDENT IN AN ORGANIZED HEALTH CARE EDUCATION/TRAINING PROGRAM

## 2021-03-12 PROCEDURE — 25010000002 MIDAZOLAM PER 1 MG: Performed by: NURSE ANESTHETIST, CERTIFIED REGISTERED

## 2021-03-12 PROCEDURE — 0SCG4ZZ EXTIRPATION OF MATTER FROM LEFT ANKLE JOINT, PERCUTANEOUS ENDOSCOPIC APPROACH: ICD-10-PCS | Performed by: PODIATRIST

## 2021-03-12 PROCEDURE — 83735 ASSAY OF MAGNESIUM: CPT | Performed by: STUDENT IN AN ORGANIZED HEALTH CARE EDUCATION/TRAINING PROGRAM

## 2021-03-12 PROCEDURE — 99233 SBSQ HOSP IP/OBS HIGH 50: CPT | Performed by: STUDENT IN AN ORGANIZED HEALTH CARE EDUCATION/TRAINING PROGRAM

## 2021-03-12 PROCEDURE — 28002 TREATMENT OF FOOT INFECTION: CPT | Performed by: PODIATRIST

## 2021-03-12 PROCEDURE — 0S9G4ZZ DRAINAGE OF LEFT ANKLE JOINT, PERCUTANEOUS ENDOSCOPIC APPROACH: ICD-10-PCS | Performed by: PODIATRIST

## 2021-03-12 PROCEDURE — 0SBG4ZZ EXCISION OF LEFT ANKLE JOINT, PERCUTANEOUS ENDOSCOPIC APPROACH: ICD-10-PCS | Performed by: PODIATRIST

## 2021-03-12 PROCEDURE — 25010000002 DEXAMETHASONE PER 1 MG: Performed by: NURSE ANESTHETIST, CERTIFIED REGISTERED

## 2021-03-12 PROCEDURE — 87186 SC STD MICRODIL/AGAR DIL: CPT | Performed by: PODIATRIST

## 2021-03-12 PROCEDURE — 94760 N-INVAS EAR/PLS OXIMETRY 1: CPT

## 2021-03-12 PROCEDURE — 25010000002 FENTANYL CITRATE (PF) 100 MCG/2ML SOLUTION: Performed by: NURSE ANESTHETIST, CERTIFIED REGISTERED

## 2021-03-12 PROCEDURE — 20680 REMOVAL OF IMPLANT DEEP: CPT | Performed by: PODIATRIST

## 2021-03-12 PROCEDURE — 94799 UNLISTED PULMONARY SVC/PX: CPT

## 2021-03-12 PROCEDURE — 25010000002 ONDANSETRON PER 1 MG: Performed by: STUDENT IN AN ORGANIZED HEALTH CARE EDUCATION/TRAINING PROGRAM

## 2021-03-12 PROCEDURE — 0SPG44Z REMOVAL OF INTERNAL FIXATION DEVICE FROM LEFT ANKLE JOINT, PERCUTANEOUS ENDOSCOPIC APPROACH: ICD-10-PCS | Performed by: PODIATRIST

## 2021-03-12 PROCEDURE — 63710000001 INSULIN DETEMIR PER 5 UNITS: Performed by: PODIATRIST

## 2021-03-12 PROCEDURE — 88305 TISSUE EXAM BY PATHOLOGIST: CPT

## 2021-03-12 PROCEDURE — 25010000002 ENOXAPARIN PER 10 MG: Performed by: STUDENT IN AN ORGANIZED HEALTH CARE EDUCATION/TRAINING PROGRAM

## 2021-03-12 PROCEDURE — 88311 DECALCIFY TISSUE: CPT

## 2021-03-12 PROCEDURE — 88300 SURGICAL PATH GROSS: CPT

## 2021-03-12 PROCEDURE — 87147 CULTURE TYPE IMMUNOLOGIC: CPT | Performed by: PODIATRIST

## 2021-03-12 PROCEDURE — 85025 COMPLETE CBC W/AUTO DIFF WBC: CPT | Performed by: STUDENT IN AN ORGANIZED HEALTH CARE EDUCATION/TRAINING PROGRAM

## 2021-03-12 PROCEDURE — 86140 C-REACTIVE PROTEIN: CPT | Performed by: STUDENT IN AN ORGANIZED HEALTH CARE EDUCATION/TRAINING PROGRAM

## 2021-03-12 PROCEDURE — 87205 SMEAR GRAM STAIN: CPT | Performed by: PODIATRIST

## 2021-03-12 PROCEDURE — 87070 CULTURE OTHR SPECIMN AEROBIC: CPT | Performed by: PODIATRIST

## 2021-03-12 PROCEDURE — 25010000003 AMPICILLIN-SULBACTAM PER 1.5 G: Performed by: PODIATRIST

## 2021-03-12 PROCEDURE — 25010000003 AMPICILLIN-SULBACTAM PER 1.5 G: Performed by: STUDENT IN AN ORGANIZED HEALTH CARE EDUCATION/TRAINING PROGRAM

## 2021-03-12 PROCEDURE — 29898 ANKLE ARTHROSCOPY/SURGERY: CPT | Performed by: PODIATRIST

## 2021-03-12 PROCEDURE — 25010000002 PROPOFOL 10 MG/ML EMULSION: Performed by: NURSE ANESTHETIST, CERTIFIED REGISTERED

## 2021-03-12 PROCEDURE — 82962 GLUCOSE BLOOD TEST: CPT

## 2021-03-12 PROCEDURE — 25010000002 SUCCINYLCHOLINE PER 20 MG: Performed by: NURSE ANESTHETIST, CERTIFIED REGISTERED

## 2021-03-12 RX ORDER — HYDROCODONE BITARTRATE AND ACETAMINOPHEN 10; 325 MG/1; MG/1
1 TABLET ORAL EVERY 6 HOURS PRN
Status: DISCONTINUED | OUTPATIENT
Start: 2021-03-12 | End: 2021-03-14 | Stop reason: HOSPADM

## 2021-03-12 RX ORDER — BUPIVACAINE HYDROCHLORIDE 5 MG/ML
INJECTION, SOLUTION EPIDURAL; INTRACAUDAL AS NEEDED
Status: DISCONTINUED | OUTPATIENT
Start: 2021-03-12 | End: 2021-03-12 | Stop reason: HOSPADM

## 2021-03-12 RX ORDER — SODIUM CHLORIDE, SODIUM GLUCONATE, SODIUM ACETATE, POTASSIUM CHLORIDE AND MAGNESIUM CHLORIDE 526; 502; 368; 37; 30 MG/100ML; MG/100ML; MG/100ML; MG/100ML; MG/100ML
INJECTION, SOLUTION INTRAVENOUS CONTINUOUS PRN
Status: DISCONTINUED | OUTPATIENT
Start: 2021-03-12 | End: 2021-03-12 | Stop reason: SURG

## 2021-03-12 RX ORDER — SUCCINYLCHOLINE CHLORIDE 20 MG/ML
INJECTION INTRAMUSCULAR; INTRAVENOUS AS NEEDED
Status: DISCONTINUED | OUTPATIENT
Start: 2021-03-12 | End: 2021-03-12 | Stop reason: SURG

## 2021-03-12 RX ORDER — PROPOFOL 10 MG/ML
VIAL (ML) INTRAVENOUS AS NEEDED
Status: DISCONTINUED | OUTPATIENT
Start: 2021-03-12 | End: 2021-03-12 | Stop reason: SURG

## 2021-03-12 RX ORDER — DEXTROSE MONOHYDRATE 25 G/50ML
25 INJECTION, SOLUTION INTRAVENOUS ONCE
Status: COMPLETED | OUTPATIENT
Start: 2021-03-12 | End: 2021-03-12

## 2021-03-12 RX ORDER — NALOXONE HCL 0.4 MG/ML
0.2 VIAL (ML) INJECTION
Status: DISCONTINUED | OUTPATIENT
Start: 2021-03-12 | End: 2021-03-14 | Stop reason: HOSPADM

## 2021-03-12 RX ORDER — LABETALOL HYDROCHLORIDE 5 MG/ML
5 INJECTION, SOLUTION INTRAVENOUS
Status: DISCONTINUED | OUTPATIENT
Start: 2021-03-12 | End: 2021-03-12 | Stop reason: HOSPADM

## 2021-03-12 RX ORDER — DEXAMETHASONE SODIUM PHOSPHATE 4 MG/ML
INJECTION, SOLUTION INTRA-ARTICULAR; INTRALESIONAL; INTRAMUSCULAR; INTRAVENOUS; SOFT TISSUE AS NEEDED
Status: DISCONTINUED | OUTPATIENT
Start: 2021-03-12 | End: 2021-03-12 | Stop reason: SURG

## 2021-03-12 RX ORDER — ONDANSETRON 2 MG/ML
4 INJECTION INTRAMUSCULAR; INTRAVENOUS ONCE AS NEEDED
Status: DISCONTINUED | OUTPATIENT
Start: 2021-03-12 | End: 2021-03-12 | Stop reason: HOSPADM

## 2021-03-12 RX ORDER — FENTANYL CITRATE 50 UG/ML
INJECTION, SOLUTION INTRAMUSCULAR; INTRAVENOUS AS NEEDED
Status: DISCONTINUED | OUTPATIENT
Start: 2021-03-12 | End: 2021-03-12 | Stop reason: SURG

## 2021-03-12 RX ORDER — POTASSIUM CHLORIDE 750 MG/1
40 CAPSULE, EXTENDED RELEASE ORAL ONCE
Status: COMPLETED | OUTPATIENT
Start: 2021-03-12 | End: 2021-03-12

## 2021-03-12 RX ORDER — LIDOCAINE HYDROCHLORIDE 20 MG/ML
INJECTION, SOLUTION INFILTRATION; PERINEURAL AS NEEDED
Status: DISCONTINUED | OUTPATIENT
Start: 2021-03-12 | End: 2021-03-12 | Stop reason: SURG

## 2021-03-12 RX ORDER — MIDAZOLAM HYDROCHLORIDE 1 MG/ML
INJECTION INTRAMUSCULAR; INTRAVENOUS AS NEEDED
Status: DISCONTINUED | OUTPATIENT
Start: 2021-03-12 | End: 2021-03-12 | Stop reason: SURG

## 2021-03-12 RX ADMIN — ONDANSETRON HYDROCHLORIDE 4 MG: 2 INJECTION INTRAMUSCULAR; INTRAVENOUS at 13:02

## 2021-03-12 RX ADMIN — ACETAMINOPHEN 650 MG: 325 TABLET, FILM COATED ORAL at 03:22

## 2021-03-12 RX ADMIN — AMPICILLIN SODIUM AND SULBACTAM SODIUM 3 G: 2; 1 INJECTION, POWDER, FOR SOLUTION INTRAMUSCULAR; INTRAVENOUS at 04:39

## 2021-03-12 RX ADMIN — INSULIN DETEMIR 60 UNITS: 100 INJECTION, SOLUTION SUBCUTANEOUS at 21:26

## 2021-03-12 RX ADMIN — DEXTROSE MONOHYDRATE 25 G: 500 INJECTION PARENTERAL at 06:45

## 2021-03-12 RX ADMIN — AMPICILLIN SODIUM AND SULBACTAM SODIUM 3 G: 2; 1 INJECTION, POWDER, FOR SOLUTION INTRAMUSCULAR; INTRAVENOUS at 22:50

## 2021-03-12 RX ADMIN — METOPROLOL SUCCINATE 25 MG: 25 TABLET, FILM COATED, EXTENDED RELEASE ORAL at 14:35

## 2021-03-12 RX ADMIN — MIDAZOLAM HYDROCHLORIDE 2 MG: 2 INJECTION, SOLUTION INTRAMUSCULAR; INTRAVENOUS at 11:02

## 2021-03-12 RX ADMIN — HYDROCODONE BITARTRATE AND ACETAMINOPHEN 1 TABLET: 10; 325 TABLET ORAL at 21:24

## 2021-03-12 RX ADMIN — LABETALOL HYDROCHLORIDE 5 MG: 5 INJECTION, SOLUTION INTRAVENOUS at 13:57

## 2021-03-12 RX ADMIN — DEXTROSE MONOHYDRATE 25 ML: 25 INJECTION, SOLUTION INTRAVENOUS at 13:34

## 2021-03-12 RX ADMIN — FUROSEMIDE 40 MG: 40 TABLET ORAL at 14:35

## 2021-03-12 RX ADMIN — GABAPENTIN 400 MG: 400 CAPSULE ORAL at 21:24

## 2021-03-12 RX ADMIN — PANTOPRAZOLE SODIUM 20 MG: 20 TABLET, DELAYED RELEASE ORAL at 14:34

## 2021-03-12 RX ADMIN — FENTANYL CITRATE 25 MCG: 50 INJECTION INTRAMUSCULAR; INTRAVENOUS at 11:47

## 2021-03-12 RX ADMIN — FENTANYL CITRATE 25 MCG: 50 INJECTION INTRAMUSCULAR; INTRAVENOUS at 12:47

## 2021-03-12 RX ADMIN — ARIPIPRAZOLE 15 MG: 15 TABLET ORAL at 14:35

## 2021-03-12 RX ADMIN — SUCCINYLCHOLINE CHLORIDE 140 MG: 20 INJECTION, SOLUTION INTRAMUSCULAR; INTRAVENOUS at 11:06

## 2021-03-12 RX ADMIN — GUAIFENESIN AND DEXTROMETHORPHAN 5 ML: 100; 10 SYRUP ORAL at 21:24

## 2021-03-12 RX ADMIN — CLOPIDOGREL BISULFATE 75 MG: 75 TABLET ORAL at 14:34

## 2021-03-12 RX ADMIN — LIDOCAINE HYDROCHLORIDE 100 MG: 20 INJECTION, SOLUTION INFILTRATION; PERINEURAL at 11:06

## 2021-03-12 RX ADMIN — ASPIRIN 81 MG: 81 TABLET, FILM COATED ORAL at 14:34

## 2021-03-12 RX ADMIN — ATORVASTATIN CALCIUM 80 MG: 40 TABLET, FILM COATED ORAL at 14:35

## 2021-03-12 RX ADMIN — POTASSIUM CHLORIDE 40 MEQ: 10 CAPSULE, COATED, EXTENDED RELEASE ORAL at 14:34

## 2021-03-12 RX ADMIN — DEXAMETHASONE SODIUM PHOSPHATE 4 MG: 4 INJECTION, SOLUTION INTRAMUSCULAR; INTRAVENOUS at 13:02

## 2021-03-12 RX ADMIN — VENLAFAXINE HYDROCHLORIDE 150 MG: 75 CAPSULE, EXTENDED RELEASE ORAL at 14:35

## 2021-03-12 RX ADMIN — ENOXAPARIN SODIUM 40 MG: 40 INJECTION SUBCUTANEOUS at 21:24

## 2021-03-12 RX ADMIN — FENTANYL CITRATE 25 MCG: 50 INJECTION INTRAMUSCULAR; INTRAVENOUS at 12:55

## 2021-03-12 RX ADMIN — SODIUM CHLORIDE 100 ML/HR: 9 INJECTION, SOLUTION INTRAVENOUS at 14:34

## 2021-03-12 RX ADMIN — PROPOFOL 200 MG: 10 INJECTION, EMULSION INTRAVENOUS at 11:06

## 2021-03-12 RX ADMIN — AMPICILLIN SODIUM AND SULBACTAM SODIUM 3 G: 2; 1 INJECTION, POWDER, FOR SOLUTION INTRAMUSCULAR; INTRAVENOUS at 17:42

## 2021-03-12 RX ADMIN — SODIUM CHLORIDE, SODIUM GLUCONATE, SODIUM ACETATE, POTASSIUM CHLORIDE AND MAGNESIUM CHLORIDE: 526; 502; 368; 37; 30 INJECTION, SOLUTION INTRAVENOUS at 12:05

## 2021-03-12 RX ADMIN — SODIUM CHLORIDE, PRESERVATIVE FREE 10 ML: 5 INJECTION INTRAVENOUS at 21:27

## 2021-03-12 RX ADMIN — GABAPENTIN 400 MG: 400 CAPSULE ORAL at 15:12

## 2021-03-12 NOTE — PAYOR COMM NOTE
"Ariana Bailey (61 y.o. Female)     Date of Birth Social Security Number Address Home Phone MRN    1960  [id number]  [facility] [address] [phone] [id number]    Church Marital Status          Nondenominational        Admission Date Admission Type Admitting Provider Attending Provider Department, Room/Bed    3/9/21 Emergency Tirso Herrera MD Weber, Alexander B, MD [facility] OR, MAD OR/MAIN OR    Discharge Date Discharge Disposition Discharge Destination                       Attending Provider: Seb Troncoso MD    Allergies: Seroquel [Quetiapine], Avandia [Rosiglitazone], Morphine And Related, Oxycodone    Isolation: None   Infection: None   Code Status: CPR    Ht: 172.7 cm (67.99\")   Wt: 133 kg (293 lb)    Admission Cmt: None   Principal Problem: Staphylococcus aureus bacteremia with sepsis (CMS/[facility]) [A41.01] More...                 Active Insurance as of 3/9/2021     Primary Coverage     Payor Plan Insurance Group Employer/Plan Group    [facility] BLUE CROSS [employer] [facility] EMPLOYEE [id number]     Payor Plan Address Payor Plan Phone Number Payor Plan Fax Number Effective Dates    [address] [phone]  1/1/2015 - None Entered    [place] [address]       Subscriber Name Subscriber Birth Date Member ID       ARIANA BAILEY 1960 [id number]                 Emergency Contacts      (Rel.) Home Phone Work Phone Mobile Phone    Nadeem Bailey (Spouse) [phone] -- [phone]    Елена David (Sister) [phone] -- [phone]        Joanna De Dios RN [facility]  [phone] phone  [phone] fax  Ref#[id number]        Current Facility-Administered Medications   Medication Dose Route Frequency Provider Last Rate Last Admin   • [MAR Hold] acetaminophen (TYLENOL) tablet 650 mg  650 mg Oral Q4H PRN Kiara Whitt MD   650 mg at 03/12/21 0322   • ampicillin-sulbactam 3 g/100 mL 0.9% NS (MBP)  3 g Intravenous " Q6H Peyton Sharma MD   3 g at 03/12/21 0439   • [MAR Hold] ARIPiprazole (ABILIFY) tablet 15 mg  15 mg Oral Daily Kiara Whitt MD   15 mg at 03/11/21 0851   • [MAR Hold] aspirin EC tablet 81 mg  81 mg Oral Daily Kiara Whitt MD   81 mg at 03/11/21 0851   • [MAR Hold] atorvastatin (LIPITOR) tablet 80 mg  80 mg Oral Daily Kiara Whitt MD   80 mg at 03/11/21 0851   • [MAR Hold] bisacodyl (DULCOLAX) EC tablet 5 mg  5 mg Oral Daily PRN Kiara Whitt MD       • [MAR Hold] clopidogrel (PLAVIX) tablet 75 mg  75 mg Oral Daily Kiara Whitt MD   75 mg at 03/11/21 0851   • [MAR Hold] dextrose (D50W) 25 g/ 50mL Intravenous Solution 25 g  25 g Intravenous Q15 Min PRN Kiara Whitt MD       • [MAR Hold] dextrose (D50W) 25 g/ 50mL Intravenous Solution 25 g  25 g Intravenous Q15 Min PRN Peyton Sharma MD   25 g at 03/12/21 0645   • [MAR Hold] dextrose (GLUTOSE) oral gel 15 g  15 g Oral Q15 Min PRN Kiara Whitt MD       • [MAR Hold] furosemide (LASIX) tablet 40 mg  40 mg Oral Daily Kiara Whitt MD   40 mg at 03/11/21 0851   • gabapentin (NEURONTIN) capsule 400 mg  400 mg Oral TID Kiara Whitt MD   400 mg at 03/11/21 1710   • [MAR Hold] glucagon (human recombinant) (GLUCAGEN DIAGNOSTIC) injection 1 mg  1 mg Subcutaneous Q15 Min PRN Kiara Whitt MD       • [MAR Hold] guaiFENesin-dextromethorphan (ROBITUSSIN DM) 100-10 MG/5ML syrup 5 mL  5 mL Oral Q4H PRN Kiara Whitt MD   5 mL at 03/11/21 2232   • [MAR Hold] HYDROcodone-acetaminophen (NORCO)  MG per tablet 1 tablet  1 tablet Oral Q6H PRN Seb Troncoso MD       • [MAR Hold] HYDROmorphone (DILAUDID) injection 0.5 mg  0.5 mg Intravenous Q2H PRN Seb Troncoso MD        And   • [MAR Hold] naloxone (NARCAN) injection 0.2 mg  0.2 mg Intravenous Q5 Min PRN Seb Troncoso MD       • [MAR Hold] ibuprofen (ADVIL,MOTRIN) tablet 500 mg  500 mg Oral Q6H PRN Peyton Sharma MD   500 mg at 03/11/21 0309   • [MAR Hold]  insulin aspart (novoLOG) injection 0-7 Units  0-7 Units Subcutaneous TID AC Kiara Whitt MD   3 Units at 03/11/21 1711   • [MAR Hold] insulin detemir (LEVEMIR) injection 60 Units  60 Units Subcutaneous Nightly Kiara Whitt MD   60 Units at 03/11/21 2021   • [MAR Hold] LORazepam (ATIVAN) tablet 0.5 mg  0.5 mg Oral Daily PRN Kiara Whitt MD       • [MAR Hold] meclizine (ANTIVERT) tablet 25 mg  25 mg Oral TID PRN Kiara Whitt MD       • [MAR Hold] melatonin tablet 5.25 mg  5.25 mg Oral Nightly PRN Kiara Whitt MD       • [MAR Hold] metoclopramide (REGLAN) tablet 10 mg  10 mg Oral 4x Daily PRN Kiara Whitt MD       • metoprolol succinate XL (TOPROL-XL) 24 hr tablet 25 mg  25 mg Oral Daily Kiara Whitt MD   25 mg at 03/11/21 0851   • [MAR Hold] mirtazapine (REMERON) tablet 45 mg  45 mg Oral Nightly Kiara Whitt MD   45 mg at 03/11/21 2022   • [MAR Hold] ondansetron (ZOFRAN) injection 4 mg  4 mg Intravenous Q6H PRN Kiara Whitt MD       • [MAR Hold] ondansetron (ZOFRAN) tablet 8 mg  8 mg Oral Q8H PRN Kiara Whitt MD   8 mg at 03/11/21 1045   • [MAR Hold] pantoprazole (PROTONIX) EC tablet 20 mg  20 mg Oral Daily Kiara Whitt MD   20 mg at 03/11/21 0851   • potassium chloride (MICRO-K) CR capsule 40 mEq  40 mEq Oral Once Peyton Sharma MD       • [MAR Hold] sodium chloride 0.9 % flush 10 mL  10 mL Intravenous PRN Kiara Whitt MD       • [MAR Hold] sodium chloride 0.9 % flush 10 mL  10 mL Intravenous Q12H Kiara Whitt MD   10 mL at 03/11/21 2023   • [MAR Hold] sodium chloride 0.9 % flush 10 mL  10 mL Intravenous PRN Kiara Whitt MD       • sodium chloride 0.9 % infusion  100 mL/hr Intravenous Continuous Peyton Sharma  mL/hr at 03/11/21 2232 100 mL/hr at 03/11/21 2232   • [MAR Hold] sodium chloride 0.9% - IBW for BMI > 30 bolus 1,917 mL  30 mL/kg (Ideal) Intravenous Once Kiara Whitt MD   Stopped at 03/09/21 2316   • [MAR Hold] venlafaxine XR  (EFFEXOR-XR) 24 hr capsule 150 mg  150 mg Oral Daily With Breakfast Kiara Whitt MD   150 mg at 21 0851     Facility-Administered Medications Ordered in Other Encounters   Medication Dose Route Frequency Provider Last Rate Last Admin   • lidocaine (XYLOCAINE) 2% injection   Intravenous PRN Yvette Henry, CRNA   100 mg at 21 1106   • midazolam (VERSED) injection   Intravenous PRN Yvette Henry, CRNA   2 mg at 21 1102   • Propofol (DIPRIVAN) injection   Intravenous PRN Yvette Henry, CRNA   200 mg at 21 1106     Operative/Procedure Notes (most recent note)    No notes of this type exist for this encounter.            Physician Progress Notes (most recent note)      Seb Troncoso MD at 21 0635              FAMILY MEDICINE DAILY PROGRESS NOTE    NAME: Ariana Martinez  : 1960  MRN: [id number]      LOS: 3 days     PROVIDER OF SERVICE: Seb Troncoso MD    Chief Complaint: Staphylococcus aureus bacteremia with sepsis (CMS/[facility])    Subjective:     Interval History:  History taken from: patient chart  Patient Complaints: Left foot pain and back soreness      Review of Systems:   Review of Systems    Objective:     Vital Signs  Temp:  [97.1 °F (36.2 °C)-100.8 °F (38.2 °C)] 99.3 °F (37.4 °C)  Heart Rate:  [75-95] 91  Resp:  [18] 18  BP: (117-156)/(55-71) 156/67   Body mass index is 44.56 kg/m².    Physical Exam  Physical Exam    Scheduled Meds   ampicillin-sulbactam, 3 g, Intravenous, Q6H  ARIPiprazole, 15 mg, Oral, Daily  aspirin, 81 mg, Oral, Daily  atorvastatin, 80 mg, Oral, Daily  clopidogrel, 75 mg, Oral, Daily  enoxaparin, 40 mg, Subcutaneous, Q24H  furosemide, 40 mg, Oral, Daily  gabapentin, 400 mg, Oral, TID  insulin aspart, 0-7 Units, Subcutaneous, TID AC  insulin detemir, 60 Units, Subcutaneous, Nightly  metoprolol succinate XL, 25 mg, Oral, Daily  mirtazapine, 45 mg, Oral, Nightly  pantoprazole, 20 mg, Oral, Daily  potassium chloride, 40 mEq,  Oral, Once  sodium chloride, 10 mL, Intravenous, Q12H  sodium chloride 0.9% - IBW for BMI > 30, 30 mL/kg (Ideal), Intravenous, Once  venlafaxine XR, 150 mg, Oral, Daily With Breakfast       PRN Meds   •  acetaminophen  •  bisacodyl  •  dextrose  •  dextrose  •  dextrose  •  glucagon (human recombinant)  •  guaiFENesin-dextromethorphan  •  HYDROcodone-acetaminophen  •  ibuprofen  •  LORazepam  •  meclizine  •  melatonin  •  metoclopramide  •  ondansetron  •  ondansetron  •  [COMPLETED] Insert peripheral IV **AND** sodium chloride  •  sodium chloride  •  traMADol      Diagnostic Data    Results from last 7 days   Lab Units 03/12/21  0539   WBC 10*3/mm3 10.55   HEMOGLOBIN g/dL 10.4*   HEMATOCRIT % 31.0*   PLATELETS 10*3/mm3 359   GLUCOSE mg/dL 67   CREATININE mg/dL 1.16*   BUN mg/dL 10   SODIUM mmol/L 137   POTASSIUM mmol/L 3.1*   AST (SGOT) U/L 12   ALT (SGPT) U/L 12   ALK PHOS U/L 122*   BILIRUBIN mg/dL 0.5   ANION GAP mmol/L 8.0       US Guided Vascular Access    Result Date: 3/10/2021  Midline placement right cephalic vein. Vascular access device placed under ultrasound guidance as described above Electronically signed by:  Vamshi Rose MD  3/10/2021 5:03 PM CST Workstation: [id number]    CT Lower Extremity Left Without Contrast    Result Date: 3/10/2021  1. Slightly increased fragmentation of the talus and distal tibia previous study, including minimal destructive changes of the anterior aspect of the distal tibia. 2. Increasing joint fluid compared to the previous study, now with multiple tiny locules of gas, concerning for infection of joint fluid. 3. Slightly increasing fluid collection posterior to the tibiotalar joint, surrounding the Achilles tendon. 4. The posterior most calcaneal screw traversing the subtalar joint appears to have broken or crack since the previous study, though it is nondisplaced. [facility] requested to telephone these results to licensed caregiver immediately at 6:18 PM EST on 3/16/2021.  Electronically signed by:  Janki Molina MD  3/10/2021 5:19 PM CST Workstation: [id number]        I reviewed the patient's new clinical results.    Assessment/Plan:     Active Hospital Problems    Diagnosis  POA   • **Staphylococcus aureus bacteremia with sepsis (CMS/HCC) [A41.01]  Yes     Priority: High     Sepsis secondary to left lower extremity cellulitis. Given 1 L bolus in the ED. Procal elevated. S. Aureus growing on blood cultures.  Gram-negative also growing on blood cultures.  No vegetation seen on echo.  Vancomycin and Zosyn discontinued  -Continue ampicillin sulbactam         • Cellulitis of left lower extremity [L03.116]  Yes     Priority: High     CTA chest shows no evidence of pulmonary embolism per radiologist interpretation. Duplex venous ultrasound showed no evidence of DVT per radiology interpretation. CT LLE concerning for septic joint and worsening osseous changes.  -Podiatry surgery today  -Continue ampicillin sulbactam       • Urinary retention [R33.9]  Yes     -Jose in place due to requiring multiple straight caths     • CAD (coronary artery disease) [I25.10]  Yes     -Continue metoprolol and Plavix     • Hypertension [I10]  Yes     -Continue metoprolol, hydrochlorothiazide, Lasix     • CHON (acute kidney injury) (CMS/HCC) [N17.9]  Yes     Baseline creatinine ~0.8.   -IV fluids     • (HFpEF) heart failure with preserved ejection fraction (CMS/HCC) [I50.30]  Yes     -Continue Lasix, metoprolol, atorvastatin       • Depression, major, recurrent, moderate (CMS/HCC) [F33.1]  Yes     Continue home Effexor, Remeron, Ativan, Abilify     • 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, with long-term current use of insulin (CMS/HCC) [E11.49, E11.65, Z79.4]  Not Applicable     -Continue 60 units Levemir nightly  -Sliding scale insulin for additional coverage         DVT prophylaxis: On hold for procedure  Code status is   Code Status and Medical Interventions:   Ordered at:  21 2234     Code Status:    CPR     Medical Interventions (Level of Support Prior to Arrest):    Full       Plan for disposition:Where: home health and When:  48 to 72 hours      Time: 14 min     This document has been electronically signed by Seb Troncoso MD on 2021 07:38 CST        Electronically signed by Seb Troncoso MD at 21 0740       Medical Student Notes (most recent note)    No notes of this type exist for this encounter.            Consult Notes (most recent note)      Ignacio Lord DPM at 03/10/21 1600      Consult Orders    1. Foot / Ankle (on-call MD unless specified) [[id number]] ordered by Kiara Whitt MD                 [facility]   Consult Note    Patient Name: Ariana Martinez  : 1960  MRN: [id number]  Primary Care Physician: Kimberly Ferguson APRN  Referring Physician: True Crocker MD  Date of admission: 3/9/2021    Foot / Ankle (on-call MD unless specified)  Consult performed by: Ignacio Lord DPM  Consult ordered by: Kiara Whitt MD  Reason for consult: Left ankle cellulitis        Subjective   Subjective     Reason for Consult/ Chief Complaint: Left ankle pain, cellulitis    Ariana Martinez is a 61 y.o. female with history of diabetes and complicated history of left ankle surgery.  Patient previously underwent subtalar joint and talonavicular joint arthrodesis with delayed union of the subtalar joint.  Patient did have hardware loosening which created injury to the talar dome.  Approximately 2 weeks ago she underwent partial hardware removal and ankle exostectomy.  She did progressively note increased swelling redness and calf pain.  She was sent to the emergency department yesterday after having a D-dimer of 4000 in the clinic setting.  Her venous ultrasound was negative for DVT but she did display a white count of 22,000.  She is admitted for cellulitis.    Leg Swelling  This is a new problem. The current episode  started 1 to 4 weeks ago. The problem occurs constantly. The problem has been gradually worsening. Associated symptoms include arthralgias and chills. The symptoms are aggravated by walking and standing. She has tried nothing for the symptoms. The treatment provided no relief.       Review of Systems   Constitutional: Positive for chills.   HENT: Negative.    Respiratory: Negative.    Cardiovascular: Positive for leg swelling.   Musculoskeletal: Positive for arthralgias.   Skin: Positive for color change.   Neurological: Negative.    Psychiatric/Behavioral: Negative.         Personal History     Past Medical History:   Diagnosis Date   • Angina, class IV (CMS/[facility])    • Anxiety    • Anxiety and depression    • Arthritis    • Benign paroxysmal positional vertigo    • Bladder disorder     has bladder stimulator   • Carpal tunnel syndrome    • CHF (congestive heart failure) (CMS/[facility])    • Chronic pain    • Coronary atherosclerosis     hx CABG 2005.  is followed by Dr Kwon   • Depression    • Diabetes mellitus (CMS/[facility])     Type 2, controlled   • Diabetic polyneuropathy (CMS/[facility])    • Disease of thyroid gland    • Elevated cholesterol    • Female stress incontinence    • Foot pain, left    • Full dentures    • Gastroparesis    • GERD (gastroesophageal reflux disease)    • Hyperlipidemia    • Hypertension    • Low back pain    • Malaise and fatigue    • Multiple joint pain    • Obesity     Refuses to be weighed   • Otalgia     Both   • Perforation of tympanic membrane     Left   • Postoperative wound infection    • Vitamin D deficiency    • Wears glasses     reading       Past Surgical History:   Procedure Laterality Date   • ABDOMINAL SURGERY     • ANGIOPLASTY      coronary   • ANKLE ARTHROSCOPY Left 2/26/2021    Procedure: Left foot hardware removal, ankle arthroscopy, bone grafting , left foot exostectomy;  Surgeon: Ignacio Lord DPM;  [facility]: [facility];  Service: Podiatry;  Laterality: Left;   • BREAST  BIOPSY Right    • CARDIAC CATHETERIZATION     • CARDIAC CATHETERIZATION N/A 6/20/2017    Procedure: Right Heart Cath;  Surgeon: Can Kwon MD PhD;  Location: [facility] CATH INVASIVE LOCATION;  Service:    • CARDIAC CATHETERIZATION N/A 2/18/2020    Procedure: Left Heart Cath;  Surgeon: Catalina Cooper MD;  Location: [facility] CATH INVASIVE LOCATION;  Service: Cardiology;  Laterality: N/A;   • CARPAL TUNNEL RELEASE     • CHOLECYSTECTOMY     • COLONOSCOPY N/A 6/24/2020    Procedure: COLONOSCOPY;  Surgeon: Julián Maldonado MD;  Location: [facility] ENDOSCOPY;  Service: Gastroenterology;  Laterality: N/A;   • CORONARY ARTERY BYPASS GRAFT  2005   • ENDOSCOPY N/A 10/19/2018    Procedure: ESOPHAGOGASTRODUODENOSCOPY possible dilation;  Surgeon: Julián Maldonado MD;  Location: [facility] ENDOSCOPY;  Service: Gastroenterology   • ENDOSCOPY N/A 6/24/2020    Procedure: ESOPHAGOGASTRODUODENOSCOPY WED appt please;  Surgeon: Julián Maldonado MD;  Location: [facility] ENDOSCOPY;  Service: Gastroenterology;  Laterality: N/A;   • ENDOSCOPY AND COLONOSCOPY     • FOOT SURGERY      Toes   • FOOT SURGERY     • GASTRIC BANDING      Revision, laparoscopic   • HYSTERECTOMY     • MOUTH SURGERY     • SALPINGO OOPHORECTOMY     • SHOULDER SURGERY     • SUBTALAR ARTHRODESIS Left 1/16/2019    Procedure: LEFT FOOT HARDWARE REMOVAL, FIFTH METATARSAL , OPEN REDUCTION INTERNAL FIXATION, CALCANEAL OSTEOTOMY;  Surgeon: Ignacio Lord DPM;  Location: [facility] OR;  Service: Podiatry   • SUBTALAR ARTHRODESIS Left 10/16/2019    Procedure: foot hardware removal, subtalar joint fusion  possible de/reattachment of achilles tendon        (c-arm);  Surgeon: Ignacio Lord DPM;  Location: [facility] OR;  Service: Podiatry   • SUBTALAR ARTHRODESIS Left 9/30/2020    Procedure: subtalar, talonavicular joint arthrodesis.  Removal hardware.          (c-arm);  Surgeon: Ignacio Lord DPM;  Location: [facility] OR;  Service: Podiatry;  Laterality: Left;   •  TRANSESOPHAGEAL ECHOCARDIOGRAM (LAMONTE)      With color flow       Family History: family history includes Diabetes in her sister, sister, sister, sister, sister, sister and another family member; Heart disease in her mother, sister, sister, and another family member; Hypertension in her mother, sister, sister, and another family member; Stroke in her mother. Otherwise pertinent FHx was reviewed and not pertinent to current issue.    Social History:  reports that she has never smoked. She has never used smokeless tobacco. She reports that she does not drink alcohol and does not use drugs.    Home Medications:  ARIPiprazole, [employer], Calcium Citrate-Vitamin D, Glucagon Emergency, Insulin Glargine, Insulin Pen Needle, LORazepam, ONE TOUCH ULTRA MINI, Syringe/Needle (Disp), aspirin, atorvastatin, clopidogrel, cyanocobalamin, doxycycline, folic acid, furosemide, gabapentin, glucose blood, glucose monitor, hydroCHLOROthiazide, insulin aspart, meclizine, metoclopramide, metoprolol succinate XL, mirtazapine, ondansetron, pantoprazole, traMADol, venlafaxine XR, and vitamin D      Allergies:  Allergies   Allergen Reactions   • Seroquel [Quetiapine] Anaphylaxis   • Avandia [Rosiglitazone] Swelling   • Morphine And Related Hallucinations   • Oxycodone Hallucinations       Objective    Objective   Vitals:  Temp:  [98.1 °F (36.7 °C)-102.7 °F (39.3 °C)] 101.1 °F (38.4 °C)  Heart Rate:  [100-112] 112  Resp:  [18-20] 20  BP: (127-145)/(54-66) 142/54    Physical Exam  Constitutional:       Appearance: She is obese.   HENT:      Head: Normocephalic and atraumatic.   Eyes:      General: Scleral icterus present.   Cardiovascular:      Rate and Rhythm: Normal rate and regular rhythm.      Pulses:           Dorsalis pedis pulses are 1+ on the left side.        Posterior tibial pulses are 1+ on the left side.   Pulmonary:      Effort: Pulmonary effort is normal.      Breath sounds: Normal breath sounds.   Musculoskeletal:       Left lower leg: Edema present.      Left foot: Decreased range of motion.        Feet:    Feet:      Right foot:      Protective Sensation: 0 sites sensed.      Left foot:      Protective Sensation: 0 sites sensed.      Skin integrity: Erythema present.   Neurological:      Mental Status: She is alert.   Psychiatric:         Mood and Affect: Mood normal.         Behavior: Behavior normal.         Result Review    Result Review:  I have personally reviewed the results from the time of this admission to 3/10/2021 16:00 CST and agree with these findings:  [x]  Laboratory  [x]  Microbiology  []  Radiology  []  EKG/Telemetry   []  Cardiology/Vascular   []  Pathology  []  Old records  []  Other:      Assessment/Plan   Assessment / Plan     Brief Patient Summary:      Active Hospital Problems:  Active Hospital Problems    Diagnosis    • **Sepsis due to methicillin susceptible Staphylococcus aureus (MSSA) with acute renal failure without septic shock (CMS/HCC)      Sepsis secondary to left lower extremity cellulitis. Given 1 L bolus in the ED. Procal elevated.   -Nitin and Zosyn  -Urinalysis and blood culture pending       • Cellulitis of left lower extremity      CTA chest shows no evidence of pulmonary embolism per radiologist interpretation. Duplex venous ultrasound showed no evidence of DVT per radiology interpretation  -Nitin and Denae  -Dr. Lord (podiatry) on board will follow patient     • CAD (coronary artery disease)      -Continue metoprolol and Plavix     • Hypertension      -Continue metoprolol, hydrochlorothiazide, Lasix     • CHON (acute kidney injury) (CMS/HCC)      -Baseline creatinine 0.79  -Creatinine today is 1.09 with a GFR of 51  -IV fluids     • (HFpEF) heart failure with preserved ejection fraction (CMS/HCC)      -Continue Lasix, metoprolol, atorvastatin  -Consider adding ACE/ARB once sepsis resolves     • Depression, major, recurrent, moderate (CMS/HCC)      Continue home Effexor, Remeron, Ativan,  Abilify     • 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, with long-term current use of insulin (CMS/[facility])      -Continue 60 units Levemir nightly  -Sliding scale insulin for additional coverage         Plan:   Dressing change at bedside.  Patient does have significant erythema, cellulitis predominantly to medial and posterior heel.  Previous culture from posterior heel growing staph and gram-negative bacilli.  Continue vancomycin and Zosyn for now.  Patient does also have a urine culture pending.  Will repeat left ankle CT to help rule out deeper abscess.  Continue to trend labs and consider removal of remaining hardware, irrigation debridement pending these results.  Will continue to follow    Electronically signed by Ignacio Lord DPM, 03/10/21, 4:00 PM CST.    Electronically signed by Ignacio Lord DPM at 03/10/21 5464

## 2021-03-12 NOTE — SIGNIFICANT NOTE
Evaluated patient in room. SPO2 83% on RA. Started oxygen at 3 L. Sats now in low 90s.       This document has been electronically signed by Seb Troncoso MD on March 12, 2021 15:11 CST

## 2021-03-12 NOTE — ANESTHESIA PROCEDURE NOTES
Airway  Urgency: elective    Date/Time: 3/12/2021 11:08 AM  End Time:3/12/2021 11:08 AM  Airway not difficult    General Information and Staff    Patient location during procedure: OR  CRNA: Yvette Henry CRNA    Indications and Patient Condition  Indications for airway management: airway protection    Preoxygenated: yes  Mask difficulty assessment: 0 - not attempted    Final Airway Details  Final airway type: endotracheal airway      Successful airway: ETT  Cuffed: yes   Successful intubation technique: video laryngoscopy  Facilitating devices/methods: intubating stylet  Endotracheal tube insertion site: oral  Blade: Dewayne  Blade size: 3  ETT size (mm): 7.5  Cormack-Lehane Classification: grade I - full view of glottis  Placement verified by: chest auscultation and capnometry   Inital cuff pressure (cm H2O): 20  Measured from: lips  Number of attempts at approach: 1  Assessment: lips, teeth, and gum same as pre-op

## 2021-03-12 NOTE — INTERVAL H&P NOTE
Allergies   Allergen Reactions    Seroquel [Quetiapine] Anaphylaxis    Avandia [Rosiglitazone] Swelling    Morphine And Related Hallucinations    Oxycodone Hallucinations     Vitals:    03/12/21 1002   BP:    Pulse: 90   Resp:    Temp:    SpO2:        H&P reviewed. The patient was examined and there are no changes to the H&P.            This document has been electronically signed by Ignacio Lord DPM on March 12, 2021 11:05 CST

## 2021-03-12 NOTE — PLAN OF CARE
Problem: Adult Inpatient Plan of Care  Goal: Plan of Care Review  Outcome: Ongoing, Progressing  Flowsheets (Taken 3/12/2021 0128)  Progress: no change  Plan of Care Reviewed With: patient  Outcome Summary: vss, room air, pt very confused this shift, receiving IV antibiotics, NPO for procedure in the morning, pt resting, will cont to monitor

## 2021-03-12 NOTE — ANESTHESIA POSTPROCEDURE EVALUATION
Patient: Ariana Martinez    Procedure Summary     Date: 03/12/21 Room / Location: [facility] OR  / [facility] OR    Anesthesia Start: 1105 Anesthesia Stop: 1324    Procedure: Left ankle arthroscopic irrigation and debridement, screw removal (Left ) Diagnosis:       Cellulitis of left lower extremity      (Cellulitis of left lower extremity [L03.116])    Surgeons: Ignacio Lord DPM Provider: Maury Macedo MD    Anesthesia Type: MAC, general ASA Status: 4          Anesthesia Type: MAC, general    Vitals  No vitals data found for the desired time range.          Post Anesthesia Care and Evaluation    Patient location during evaluation: PACU  Patient participation: complete - patient participated  Level of consciousness: responsive to verbal stimuli  Pain management: adequate  Airway patency: patent  Anesthetic complications: No anesthetic complications  PONV Status: none  Cardiovascular status: hemodynamically stable  Respiratory status: intubated and T-piece  Hydration status: acceptable

## 2021-03-12 NOTE — SIGNIFICANT NOTE
03/12/21 1340   OTHER   Discipline occupational therapy assistant   Rehab Time/Intention   Session Not Performed patient unavailable for treatment  (Pt went to surgery. OT will check back tomorrow.)

## 2021-03-12 NOTE — PROGRESS NOTES
FAMILY MEDICINE DAILY PROGRESS NOTE    NAME: Ariana Martinez  : 1960  MRN: [id number]      LOS: 3 days     PROVIDER OF SERVICE: Seb Troncoso MD    Chief Complaint: Staphylococcus aureus bacteremia with sepsis (CMS/[facility])    Subjective:     Interval History:  History taken from: patient chart  Patient Complaints: Left foot pain and back soreness    Review of Systems:   Review of Systems   Constitutional: Negative for chills and fever.   HENT: Negative for congestion and sore throat.    Eyes: Negative for pain and redness.   Respiratory: Negative for cough and shortness of breath.    Cardiovascular: Negative for chest pain and palpitations.   Gastrointestinal: Negative for abdominal pain and diarrhea.   Endocrine: Negative for polydipsia and polyphagia.   Genitourinary: Negative for dysuria and hematuria.   Musculoskeletal: Positive for back pain and myalgias. Negative for neck pain.        L foot pain; back soreness   Neurological: Negative for dizziness and light-headedness.   Psychiatric/Behavioral: Negative for agitation and confusion.       Objective:     Vital Signs  Temp:  [97.1 °F (36.2 °C)-100.8 °F (38.2 °C)] 99.3 °F (37.4 °C)  Heart Rate:  [75-95] 91  Resp:  [18] 18  BP: (117-156)/(55-71) 156/67   Body mass index is 44.56 kg/m².    Physical Exam  Physical Exam  Vitals and nursing note reviewed.   Constitutional:       General: She is not in acute distress.     Appearance: She is not diaphoretic.   Eyes:      Extraocular Movements: Extraocular movements intact.      Conjunctiva/sclera: Conjunctivae normal.   Cardiovascular:      Rate and Rhythm: Normal rate and regular rhythm.   Pulmonary:      Effort: Pulmonary effort is normal.      Breath sounds: Normal breath sounds. No wheezing.   Abdominal:      General: Bowel sounds are normal.      Palpations: Abdomen is soft.      Tenderness: There is no abdominal tenderness.   Musculoskeletal:      Comments: LLE wrapping in place   Neurological:       Mental Status: She is alert and oriented to person, place, and time.         Scheduled Meds   ampicillin-sulbactam, 3 g, Intravenous, Q6H  ARIPiprazole, 15 mg, Oral, Daily  aspirin, 81 mg, Oral, Daily  atorvastatin, 80 mg, Oral, Daily  clopidogrel, 75 mg, Oral, Daily  enoxaparin, 40 mg, Subcutaneous, Q24H  furosemide, 40 mg, Oral, Daily  gabapentin, 400 mg, Oral, TID  insulin aspart, 0-7 Units, Subcutaneous, TID AC  insulin detemir, 60 Units, Subcutaneous, Nightly  metoprolol succinate XL, 25 mg, Oral, Daily  mirtazapine, 45 mg, Oral, Nightly  pantoprazole, 20 mg, Oral, Daily  potassium chloride, 40 mEq, Oral, Once  sodium chloride, 10 mL, Intravenous, Q12H  sodium chloride 0.9% - IBW for BMI > 30, 30 mL/kg (Ideal), Intravenous, Once  venlafaxine XR, 150 mg, Oral, Daily With Breakfast       PRN Meds   •  acetaminophen  •  bisacodyl  •  dextrose  •  dextrose  •  dextrose  •  glucagon (human recombinant)  •  guaiFENesin-dextromethorphan  •  HYDROcodone-acetaminophen  •  ibuprofen  •  LORazepam  •  meclizine  •  melatonin  •  metoclopramide  •  ondansetron  •  ondansetron  •  [COMPLETED] Insert peripheral IV **AND** sodium chloride  •  sodium chloride  •  traMADol      Diagnostic Data    Results from last 7 days   Lab Units 03/12/21  0539   WBC 10*3/mm3 10.55   HEMOGLOBIN g/dL 10.4*   HEMATOCRIT % 31.0*   PLATELETS 10*3/mm3 359   GLUCOSE mg/dL 67   CREATININE mg/dL 1.16*   BUN mg/dL 10   SODIUM mmol/L 137   POTASSIUM mmol/L 3.1*   AST (SGOT) U/L 12   ALT (SGPT) U/L 12   ALK PHOS U/L 122*   BILIRUBIN mg/dL 0.5   ANION GAP mmol/L 8.0       US Guided Vascular Access    Result Date: 3/10/2021  Midline placement right cephalic vein. Vascular access device placed under ultrasound guidance as described above Electronically signed by:  Vamshi Rose MD  3/10/2021 5:03 PM CST Workstation: [id number]    CT Lower Extremity Left Without Contrast    Result Date: 3/10/2021  1. Slightly increased fragmentation of the talus and  distal tibia previous study, including minimal destructive changes of the anterior aspect of the distal tibia. 2. Increasing joint fluid compared to the previous study, now with multiple tiny locules of gas, concerning for infection of joint fluid. 3. Slightly increasing fluid collection posterior to the tibiotalar joint, surrounding the Achilles tendon. 4. The posterior most calcaneal screw traversing the subtalar joint appears to have broken or crack since the previous study, though it is nondisplaced. [facility] requested to telephone these results to licensed caregiver immediately at 6:18 PM EST on 3/16/2021. Electronically signed by:  Janki Molina MD  3/10/2021 5:19 PM CST Workstation: [id number]        I reviewed the patient's new clinical results.    Assessment/Plan:     Active Hospital Problems    Diagnosis  POA   • **Staphylococcus aureus bacteremia with sepsis (CMS/[facility]) [A41.01]  Yes     Priority: High     Sepsis secondary to left lower extremity cellulitis. Given 1 L bolus in the ED. Procal elevated. S. Aureus growing on blood cultures.  Gram-negative also growing on blood cultures.  No vegetation seen on echo.  Vancomycin and Zosyn discontinued  -Continue ampicillin sulbactam         • Cellulitis of left lower extremity [L03.116]  Yes     Priority: High     CTA chest shows no evidence of pulmonary embolism per radiologist interpretation. Duplex venous ultrasound showed no evidence of DVT per radiology interpretation. CT LLE concerning for septic joint and worsening osseous changes.  -Podiatry surgery today  -Continue ampicillin sulbactam       • Urinary retention [R33.9]  Yes     -Jose in place due to requiring multiple straight caths     • CAD (coronary artery disease) [I25.10]  Yes     -Continue metoprolol and Plavix     • Hypertension [I10]  Yes     -Continue metoprolol, hydrochlorothiazide, Lasix     • CHON (acute kidney injury) (CMS/HCC) [N17.9]  Yes     Baseline creatinine ~0.8.   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, with long-term current use of insulin (CMS/[facility]) [E11.49, E11.65, Z79.4]  Not Applicable     -Continue 60 units Levemir nightly  -Sliding scale insulin for additional coverage         DVT prophylaxis: On hold for procedure  Code status is   Code Status and Medical Interventions:   Ordered at: 03/09/21 2234     Code Status:    CPR     Medical Interventions (Level of Support Prior to Arrest):    Full       Plan for disposition:Where: home health and When:  48 to 72 hours      Time: 14 min     This document has been electronically signed by Seb Troncoso MD on March 12, 2021 07:38 CST

## 2021-03-12 NOTE — BRIEF OP NOTE
INCISION AND DRAINAGE LOWER EXTREMITY  Progress Note    Ariana Martinez  3/12/2021    Pre-op Diagnosis:   Cellulitis of left lower extremity [L03.116]       Post-Op Diagnosis Codes:     * Cellulitis of left lower extremity [L03.116]    Procedure/CPT® Codes:        Procedure(s):  Left ankle arthroscopic irrigation and debridement, screw removal    Surgeon(s):  Ignacio Lord DPM    Anesthesia: Choice    Staff:   Circulator: Elodia Aguilera RN; Franci Roberson RN  Scrub Person: Francisca Salazar; Leonela Ramirez  Assistant: Maira Epstein MA  Assistant: Maira Epstein MA      Estimated Blood Loss: minimal    Urine Voided: 200 mL    Specimens:                Specimens     ID Source Type Tests Collected By Collected At Frozen?    1 Ankle, Left Wound · WOUND CULTURE   Ignacio Lord DPM 3/12/21 1129     A Ankle, Left Hardware / Foreign Body · TISSUE PATHOLOGY EXAM   Ignacio Lord DPM 3/12/21 1223     Description: screw from left ankle    B Ankle, Left Tissue · TISSUE PATHOLOGY EXAM   Ignacio Lord DPM 3/12/21 1224     Description: left talus bone                Drains:   Urethral Catheter 16 Fr. (Active)   Daily Indications Acute Urinary Retention 03/12/21 0810   Site Assessment Clean;Skin intact 03/12/21 0810   Collection Container Standard drainage bag 03/12/21 0810   Securement Method Securing device 03/12/21 0810   Catheter care complete Yes 03/11/21 2000   Output (mL) 300 mL 03/12/21 0900       Findings: Purulence within and lateral to ankle joint    Complications: None    Assistant: Maira Epstein MA  was responsible for performing the following activities: Retraction, Suction and Irrigation and their skilled assistance was necessary for the success of this case.            This document has been electronically signed by Ignacio Lord DPM on March 12, 2021 13:29 CST     Ignacio Lord DPM     Date: 3/12/2021  Time: 13:28 CST

## 2021-03-13 VITALS
OXYGEN SATURATION: 95 % | RESPIRATION RATE: 18 BRPM | WEIGHT: 285.8 LBS | HEIGHT: 68 IN | TEMPERATURE: 98.4 F | BODY MASS INDEX: 43.32 KG/M2 | SYSTOLIC BLOOD PRESSURE: 114 MMHG | HEART RATE: 79 BPM | DIASTOLIC BLOOD PRESSURE: 71 MMHG

## 2021-03-13 PROBLEM — I33.0 ACUTE BACTERIAL ENDOCARDITIS: Status: ACTIVE | Noted: 2021-03-13

## 2021-03-13 PROBLEM — R78.81 STAPHYLOCOCCUS AUREUS BACTEREMIA: Status: ACTIVE | Noted: 2021-03-13

## 2021-03-13 PROBLEM — A41.01 STAPHYLOCOCCUS AUREUS BACTEREMIA WITH SEPSIS: Status: RESOLVED | Noted: 2021-03-10 | Resolved: 2021-03-13

## 2021-03-13 PROBLEM — A49.8 INFECTION DUE TO ACINETOBACTER SPECIES: Status: ACTIVE | Noted: 2021-03-13

## 2021-03-13 PROBLEM — N17.9 AKI (ACUTE KIDNEY INJURY) (HCC): Status: RESOLVED | Noted: 2017-08-27 | Resolved: 2021-03-13

## 2021-03-13 PROBLEM — B95.61 STAPHYLOCOCCUS AUREUS BACTEREMIA: Status: ACTIVE | Noted: 2021-03-13

## 2021-03-13 LAB
ALBUMIN SERPL-MCNC: 2.6 G/DL (ref 3.5–5.2)
ALBUMIN/GLOB SERPL: 0.8 G/DL
ALP SERPL-CCNC: 107 U/L (ref 39–117)
ALT SERPL W P-5'-P-CCNC: 11 U/L (ref 1–33)
ANION GAP SERPL CALCULATED.3IONS-SCNC: 9 MMOL/L (ref 5–15)
AST SERPL-CCNC: 13 U/L (ref 1–32)
BACTERIA UR QL AUTO: ABNORMAL /HPF
BASOPHILS # BLD AUTO: 0.07 10*3/MM3 (ref 0–0.2)
BASOPHILS NFR BLD AUTO: 0.6 % (ref 0–1.5)
BILIRUB SERPL-MCNC: 0.3 MG/DL (ref 0–1.2)
BILIRUB UR QL STRIP: NEGATIVE
BUN SERPL-MCNC: 10 MG/DL (ref 6–20)
BUN/CREAT SERPL: 9.6 (ref 7–25)
CALCIUM SPEC-SCNC: 8.5 MG/DL (ref 8.6–10.5)
CHLORIDE SERPL-SCNC: 106 MMOL/L (ref 98–107)
CLARITY UR: CLEAR
CO2 SERPL-SCNC: 25 MMOL/L (ref 22–29)
COLOR UR: YELLOW
CREAT SERPL-MCNC: 1.04 MG/DL (ref 0.57–1)
CRP SERPL-MCNC: 19.16 MG/DL (ref 0–0.5)
DEPRECATED RDW RBC AUTO: 45.4 FL (ref 37–54)
EOSINOPHIL # BLD AUTO: 0.13 10*3/MM3 (ref 0–0.4)
EOSINOPHIL NFR BLD AUTO: 1.1 % (ref 0.3–6.2)
ERYTHROCYTE [DISTWIDTH] IN BLOOD BY AUTOMATED COUNT: 13.6 % (ref 12.3–15.4)
GFR SERPL CREATININE-BSD FRML MDRD: 54 ML/MIN/1.73
GLOBULIN UR ELPH-MCNC: 3.2 GM/DL
GLUCOSE BLDC GLUCOMTR-MCNC: 113 MG/DL (ref 70–130)
GLUCOSE BLDC GLUCOMTR-MCNC: 151 MG/DL (ref 70–130)
GLUCOSE BLDC GLUCOMTR-MCNC: 184 MG/DL (ref 70–130)
GLUCOSE BLDC GLUCOMTR-MCNC: 309 MG/DL (ref 70–130)
GLUCOSE BLDC GLUCOMTR-MCNC: 86 MG/DL (ref 70–130)
GLUCOSE SERPL-MCNC: 180 MG/DL (ref 65–99)
GLUCOSE UR STRIP-MCNC: NEGATIVE MG/DL
HCT VFR BLD AUTO: 30.2 % (ref 34–46.6)
HGB BLD-MCNC: 10 G/DL (ref 12–15.9)
HGB UR QL STRIP.AUTO: ABNORMAL
HYALINE CASTS UR QL AUTO: ABNORMAL /LPF
IMM GRANULOCYTES # BLD AUTO: 0.06 10*3/MM3 (ref 0–0.05)
IMM GRANULOCYTES NFR BLD AUTO: 0.5 % (ref 0–0.5)
KETONES UR QL STRIP: NEGATIVE
LEUKOCYTE ESTERASE UR QL STRIP.AUTO: NEGATIVE
LYMPHOCYTES # BLD AUTO: 1.33 10*3/MM3 (ref 0.7–3.1)
LYMPHOCYTES NFR BLD AUTO: 11.6 % (ref 19.6–45.3)
MCH RBC QN AUTO: 30 PG (ref 26.6–33)
MCHC RBC AUTO-ENTMCNC: 33.1 G/DL (ref 31.5–35.7)
MCV RBC AUTO: 90.7 FL (ref 79–97)
MONOCYTES # BLD AUTO: 1.02 10*3/MM3 (ref 0.1–0.9)
MONOCYTES NFR BLD AUTO: 8.9 % (ref 5–12)
MRSA DNA SPEC QL NAA+PROBE: NEGATIVE
NEUTROPHILS NFR BLD AUTO: 77.3 % (ref 42.7–76)
NEUTROPHILS NFR BLD AUTO: 8.86 10*3/MM3 (ref 1.7–7)
NITRITE UR QL STRIP: NEGATIVE
NRBC BLD AUTO-RTO: 0 /100 WBC (ref 0–0.2)
PH UR STRIP.AUTO: 5.5 [PH] (ref 5–9)
PLATELET # BLD AUTO: 374 10*3/MM3 (ref 140–450)
PMV BLD AUTO: 9.6 FL (ref 6–12)
POTASSIUM SERPL-SCNC: 3.7 MMOL/L (ref 3.5–5.2)
PROT SERPL-MCNC: 5.8 G/DL (ref 6–8.5)
PROT UR QL STRIP: NEGATIVE
RBC # BLD AUTO: 3.33 10*6/MM3 (ref 3.77–5.28)
RBC # UR: ABNORMAL /HPF
REF LAB TEST METHOD: ABNORMAL
SODIUM SERPL-SCNC: 140 MMOL/L (ref 136–145)
SP GR UR STRIP: 1.01 (ref 1–1.03)
SQUAMOUS #/AREA URNS HPF: ABNORMAL /HPF
UROBILINOGEN UR QL STRIP: ABNORMAL
WBC # BLD AUTO: 11.47 10*3/MM3 (ref 3.4–10.8)
WBC UR QL AUTO: ABNORMAL /HPF

## 2021-03-13 PROCEDURE — 25010000002 LEVOFLOXACIN PER 250 MG: Performed by: STUDENT IN AN ORGANIZED HEALTH CARE EDUCATION/TRAINING PROGRAM

## 2021-03-13 PROCEDURE — 85025 COMPLETE CBC W/AUTO DIFF WBC: CPT | Performed by: PODIATRIST

## 2021-03-13 PROCEDURE — 80053 COMPREHEN METABOLIC PANEL: CPT | Performed by: PODIATRIST

## 2021-03-13 PROCEDURE — 25010000003 AMPICILLIN-SULBACTAM PER 1.5 G: Performed by: PODIATRIST

## 2021-03-13 PROCEDURE — 87641 MR-STAPH DNA AMP PROBE: CPT | Performed by: PODIATRIST

## 2021-03-13 PROCEDURE — 63710000001 ONDANSETRON PER 8 MG: Performed by: PODIATRIST

## 2021-03-13 PROCEDURE — 99239 HOSP IP/OBS DSCHRG MGMT >30: CPT | Performed by: STUDENT IN AN ORGANIZED HEALTH CARE EDUCATION/TRAINING PROGRAM

## 2021-03-13 PROCEDURE — 86140 C-REACTIVE PROTEIN: CPT | Performed by: PODIATRIST

## 2021-03-13 PROCEDURE — 99024 POSTOP FOLLOW-UP VISIT: CPT | Performed by: PODIATRIST

## 2021-03-13 PROCEDURE — 82962 GLUCOSE BLOOD TEST: CPT

## 2021-03-13 PROCEDURE — 81001 URINALYSIS AUTO W/SCOPE: CPT | Performed by: STUDENT IN AN ORGANIZED HEALTH CARE EDUCATION/TRAINING PROGRAM

## 2021-03-13 PROCEDURE — 25010000002 ENOXAPARIN PER 10 MG: Performed by: STUDENT IN AN ORGANIZED HEALTH CARE EDUCATION/TRAINING PROGRAM

## 2021-03-13 PROCEDURE — 25010000002 CEFAZOLIN PER 500 MG: Performed by: STUDENT IN AN ORGANIZED HEALTH CARE EDUCATION/TRAINING PROGRAM

## 2021-03-13 PROCEDURE — 63710000001 INSULIN ASPART PER 5 UNITS: Performed by: PODIATRIST

## 2021-03-13 RX ORDER — BUPIVACAINE HCL/0.9 % NACL/PF 0.1 %
2 PLASTIC BAG, INJECTION (ML) EPIDURAL EVERY 8 HOURS
Status: DISCONTINUED | OUTPATIENT
Start: 2021-03-13 | End: 2021-03-14 | Stop reason: HOSPADM

## 2021-03-13 RX ORDER — LISINOPRIL 10 MG/1
10 TABLET ORAL DAILY
Qty: 30 TABLET | Refills: 0 | Status: SHIPPED | OUTPATIENT
Start: 2021-03-13 | End: 2021-04-29 | Stop reason: DRUGHIGH

## 2021-03-13 RX ORDER — LEVOFLOXACIN 5 MG/ML
750 INJECTION, SOLUTION INTRAVENOUS ONCE
Start: 2021-03-13 | End: 2021-03-13

## 2021-03-13 RX ORDER — HYDROCODONE BITARTRATE AND ACETAMINOPHEN 10; 325 MG/1; MG/1
1 TABLET ORAL EVERY 6 HOURS PRN
Start: 2021-03-13 | End: 2021-03-19

## 2021-03-13 RX ORDER — LEVOFLOXACIN 5 MG/ML
750 INJECTION, SOLUTION INTRAVENOUS EVERY 24 HOURS
Status: DISCONTINUED | OUTPATIENT
Start: 2021-03-13 | End: 2021-03-14 | Stop reason: HOSPADM

## 2021-03-13 RX ORDER — NALOXONE HCL 0.4 MG/ML
0.2 VIAL (ML) INJECTION
Start: 2021-03-13

## 2021-03-13 RX ORDER — BUPIVACAINE HCL/0.9 % NACL/PF 0.1 %
2 PLASTIC BAG, INJECTION (ML) EPIDURAL EVERY 8 HOURS
Qty: 9000 ML | Refills: 1
Start: 2021-03-14 | End: 2021-04-25

## 2021-03-13 RX ADMIN — HYDROCODONE BITARTRATE AND ACETAMINOPHEN 1 TABLET: 10; 325 TABLET ORAL at 08:15

## 2021-03-13 RX ADMIN — AMPICILLIN SODIUM AND SULBACTAM SODIUM 3 G: 2; 1 INJECTION, POWDER, FOR SOLUTION INTRAMUSCULAR; INTRAVENOUS at 05:04

## 2021-03-13 RX ADMIN — CLOPIDOGREL BISULFATE 75 MG: 75 TABLET ORAL at 08:16

## 2021-03-13 RX ADMIN — GABAPENTIN 400 MG: 400 CAPSULE ORAL at 08:15

## 2021-03-13 RX ADMIN — HYDROCODONE BITARTRATE AND ACETAMINOPHEN 1 TABLET: 10; 325 TABLET ORAL at 17:14

## 2021-03-13 RX ADMIN — LORAZEPAM 0.5 MG: 0.5 TABLET ORAL at 17:16

## 2021-03-13 RX ADMIN — SODIUM CHLORIDE, PRESERVATIVE FREE 10 ML: 5 INJECTION INTRAVENOUS at 20:41

## 2021-03-13 RX ADMIN — MIRTAZAPINE 45 MG: 15 TABLET, FILM COATED ORAL at 20:40

## 2021-03-13 RX ADMIN — AMPICILLIN SODIUM AND SULBACTAM SODIUM 3 G: 2; 1 INJECTION, POWDER, FOR SOLUTION INTRAMUSCULAR; INTRAVENOUS at 11:41

## 2021-03-13 RX ADMIN — ARIPIPRAZOLE 15 MG: 15 TABLET ORAL at 08:15

## 2021-03-13 RX ADMIN — LEVOFLOXACIN 750 MG: 5 INJECTION, SOLUTION INTRAVENOUS at 18:31

## 2021-03-13 RX ADMIN — PANTOPRAZOLE SODIUM 20 MG: 20 TABLET, DELAYED RELEASE ORAL at 08:16

## 2021-03-13 RX ADMIN — GABAPENTIN 400 MG: 400 CAPSULE ORAL at 20:40

## 2021-03-13 RX ADMIN — INSULIN ASPART 2 UNITS: 100 INJECTION, SOLUTION INTRAVENOUS; SUBCUTANEOUS at 11:40

## 2021-03-13 RX ADMIN — VENLAFAXINE HYDROCHLORIDE 150 MG: 75 CAPSULE, EXTENDED RELEASE ORAL at 08:16

## 2021-03-13 RX ADMIN — INSULIN ASPART 2 UNITS: 100 INJECTION, SOLUTION INTRAVENOUS; SUBCUTANEOUS at 08:17

## 2021-03-13 RX ADMIN — ASPIRIN 81 MG: 81 TABLET, FILM COATED ORAL at 08:15

## 2021-03-13 RX ADMIN — ATORVASTATIN CALCIUM 80 MG: 40 TABLET, FILM COATED ORAL at 08:15

## 2021-03-13 RX ADMIN — SODIUM CHLORIDE, PRESERVATIVE FREE 10 ML: 5 INJECTION INTRAVENOUS at 08:16

## 2021-03-13 RX ADMIN — FUROSEMIDE 40 MG: 40 TABLET ORAL at 08:16

## 2021-03-13 RX ADMIN — CEFAZOLIN SODIUM 2 G: 10 INJECTION, POWDER, FOR SOLUTION INTRAVENOUS at 17:25

## 2021-03-13 RX ADMIN — ENOXAPARIN SODIUM 40 MG: 40 INJECTION SUBCUTANEOUS at 20:39

## 2021-03-13 RX ADMIN — ENOXAPARIN SODIUM 40 MG: 40 INJECTION SUBCUTANEOUS at 08:17

## 2021-03-13 RX ADMIN — ONDANSETRON HYDROCHLORIDE 8 MG: 4 TABLET, FILM COATED ORAL at 20:16

## 2021-03-13 RX ADMIN — SODIUM CHLORIDE 100 ML/HR: 9 INJECTION, SOLUTION INTRAVENOUS at 01:53

## 2021-03-13 RX ADMIN — GABAPENTIN 400 MG: 400 CAPSULE ORAL at 17:14

## 2021-03-13 RX ADMIN — METOPROLOL SUCCINATE 25 MG: 25 TABLET, FILM COATED, EXTENDED RELEASE ORAL at 08:15

## 2021-03-13 NOTE — PROGRESS NOTES
Podiatry/Surgery Progress Note    S:  Seen at bedside resting comfortably. NAD. States she slept better overnight.    O:  Vitals:    03/13/21 0711   BP: 101/57   Pulse: 64   Resp: 18   Temp: 96.5 °F (35.8 °C)   SpO2: 95%       Physical Exam  Constitutional:       Appearance: She is obese.   HENT:      Head: Normocephalic and atraumatic.   Eyes:      General: Scleral icterus present.   Cardiovascular:      Rate and Rhythm: Normal rate and regular rhythm.      Pulses:           Dorsalis pedis pulses are 1+ on the left side.        Posterior tibial pulses are 1+ on the left side.   Pulmonary:      Effort: Pulmonary effort is normal.      Breath sounds: Normal breath sounds.   Musculoskeletal:      Left lower leg: Edema present.      Left foot: Decreased range of motion.        Feet:    LLE posterior splint c/d/i   Toes pink. CFT wnl. Mild ecchymosis to proximal nail fold of left 2nd toe        WBC   Date Value Ref Range Status   03/13/2021 11.47 (H) 3.40 - 10.80 10*3/mm3 Final     RBC   Date Value Ref Range Status   03/13/2021 3.33 (L) 3.77 - 5.28 10*6/mm3 Final     Hemoglobin   Date Value Ref Range Status   03/13/2021 10.0 (L) 12.0 - 15.9 g/dL Final     Hematocrit   Date Value Ref Range Status   03/13/2021 30.2 (L) 34.0 - 46.6 % Final     MCV   Date Value Ref Range Status   03/13/2021 90.7 79.0 - 97.0 fL Final     MCH   Date Value Ref Range Status   03/13/2021 30.0 26.6 - 33.0 pg Final     MCHC   Date Value Ref Range Status   03/13/2021 33.1 31.5 - 35.7 g/dL Final     RDW   Date Value Ref Range Status   03/13/2021 13.6 12.3 - 15.4 % Final     RDW-SD   Date Value Ref Range Status   03/13/2021 45.4 37.0 - 54.0 fl Final     MPV   Date Value Ref Range Status   03/13/2021 9.6 6.0 - 12.0 fL Final     Platelets   Date Value Ref Range Status   03/13/2021 374 140 - 450 10*3/mm3 Final     Neutrophil %   Date Value Ref Range Status   03/13/2021 77.3 (H) 42.7 - 76.0 % Final     Lymphocyte %   Date Value Ref Range Status      03/13/2021 11.6 (L) 19.6 - 45.3 % Final     Monocyte %   Date Value Ref Range Status   03/13/2021 8.9 5.0 - 12.0 % Final     Eosinophil %   Date Value Ref Range Status   03/13/2021 1.1 0.3 - 6.2 % Final     Basophil %   Date Value Ref Range Status   03/13/2021 0.6 0.0 - 1.5 % Final     Immature Grans %   Date Value Ref Range Status   03/13/2021 0.5 0.0 - 0.5 % Final     Neutrophils, Absolute   Date Value Ref Range Status   03/13/2021 8.86 (H) 1.70 - 7.00 10*3/mm3 Final     Lymphocytes, Absolute   Date Value Ref Range Status   03/13/2021 1.33 0.70 - 3.10 10*3/mm3 Final     Monocytes, Absolute   Date Value Ref Range Status   03/13/2021 1.02 (H) 0.10 - 0.90 10*3/mm3 Final     Eosinophils, Absolute   Date Value Ref Range Status   03/13/2021 0.13 0.00 - 0.40 10*3/mm3 Final     Basophils, Absolute   Date Value Ref Range Status   03/13/2021 0.07 0.00 - 0.20 10*3/mm3 Final     Immature Grans, Absolute   Date Value Ref Range Status   03/13/2021 0.06 (H) 0.00 - 0.05 10*3/mm3 Final     nRBC   Date Value Ref Range Status   03/13/2021 0.0 0.0 - 0.2 /100 WBC Final           A/P:  1. Septic left ankle s/p irrigation and debridement. POD 1  2. DM 2    Patient doing fair following I&D of left ankle. Intraop micro and path pending. Continue unasyn. Trend CBC, CRP. Plan for splint/wound packing change tomorrow. May require additional irrigation and debridement this stay. NWB to left foot. Plan for 6 weeks IV abx as outpatient.          This document has been electronically signed by Ignacio Lord DPM on March 13, 2021 11:23 CST

## 2021-03-13 NOTE — SIGNIFICANT NOTE
ECHO with vegetation on mitral valve    Change antibiotic from Unasyn to cefazolin 6 weeks for MSSA bacteremia and Levaquin 5 days for acinetobacter growing in soft tissue       Nick Faye M.D. PGY3  [facility] Medicine [facility]  [address] [place], [place] [id number]  Office: [phone]    This document has been electronically signed by Nick Faye MD on March 13, 2021 16:38 CST

## 2021-03-13 NOTE — DISCHARGE PLACEMENT REQUEST
"Ariana Bailey (61 y.o. Female)     Date of Birth Social Security Number Address Home Phone MRN    1960  [id number]  [facility] [address] [phone] [id number]    Yazdanism Marital Status          Mu-ism        Admission Date Admission Type Admitting Provider Attending Provider Department, Room/Bed    3/9/21 Emergency Tirso Herrera MD Weber, Alexander B, MD [address], 372/1    Discharge Date Discharge Disposition Discharge Destination         Transfer to Another Facility              Attending Provider: Seb Troncoso MD    Allergies: Seroquel [Quetiapine], Avandia [Rosiglitazone], Morphine And Related, Oxycodone    Isolation: None   Infection: None   Code Status: CPR    Ht: 172.7 cm (67.99\")   Wt: 130 kg (285 lb 12.8 oz)    Admission Cmt: None   Principal Problem: Staphylococcus aureus bacteremia with sepsis (CMS/[facility]) [A41.01] More...                 Active Insurance as of 3/9/2021     Primary Coverage     Payor Plan Insurance Group Employer/Plan Group    [facility] [employer] [facility] [employer] [facility] [employer] EMPL[employer]EE [id number]     Payor Plan Address Payor Plan Phone Number Payor Plan Fax Number Effective Dates    [id number] [phone]  1/1/2015 - None Entered    [place] [address]       Subscriber Name Subscriber Birth Date Member ID       ARIANA BAILEY 1960 [id number]                 Emergency Contacts      (Rel.) Home Phone Work Phone Mobile Phone    Nadeem Bailey (Spouse) [phone] -- [phone]    Елена David (Sister) [phone] -- [phone]               History & Physical      Ignacio Lord DPM at 03/12/21 0910          Allergies   Allergen Reactions   • Seroquel [Quetiapine] Anaphylaxis   • Avandia [Rosiglitazone] Swelling   • Morphine And Related Hallucinations   • Oxycodone Hallucinations     Vitals:    03/12/21 1002   BP:    Pulse: 90   Resp:    Temp:    SpO2:        H&P reviewed. The patient was " examined and there are no changes to the H&P.            This document has been electronically signed by Ignacio Lord DPM on March 12, 2021 11:05 CST           Electronically signed by Ignacio Lord DPM at 03/12/21 1105   Source Note          Podiatry/Surgery Progress Note    S:  Seen at bedside resting comfortably. NAD. Some continued left ankle pain.    O:  Vitals:    03/11/21 1531   BP: 131/60   Pulse: 90   Resp: 18   Temp: (!) 100.8 °F (38.2 °C)   SpO2: 96%       Physical Exam  Constitutional:       Appearance: She is obese.   HENT:      Head: Normocephalic and atraumatic.   Eyes:      General: Scleral icterus present.   Cardiovascular:      Rate and Rhythm: Normal rate and regular rhythm.      Pulses:           Dorsalis pedis pulses are 1+ on the left side.        Posterior tibial pulses are 1+ on the left side.   Pulmonary:      Effort: Pulmonary effort is normal.      Breath sounds: Normal breath sounds.   Musculoskeletal:      Left lower leg: Edema present.      Left foot: Decreased range of motion.        Feet:    Feet:      Right foot:      Protective Sensation: 0 sites sensed.      Left foot:      Protective Sensation: 0 sites sensed.      Skin integrity: Erythema present.   Neurological:      Mental Status: She is alert.   Psychiatric:         Mood and Affect: Mood normal.         Behavior: Behavior normal.              WBC   Date Value Ref Range Status   03/11/2021 13.74 (H) 3.40 - 10.80 10*3/mm3 Final     RBC   Date Value Ref Range Status   03/11/2021 3.36 (L) 3.77 - 5.28 10*6/mm3 Final     Hemoglobin   Date Value Ref Range Status   03/11/2021 10.2 (L) 12.0 - 15.9 g/dL Final     Hematocrit   Date Value Ref Range Status   03/11/2021 30.6 (L) 34.0 - 46.6 % Final     MCV   Date Value Ref Range Status   03/11/2021 91.1 79.0 - 97.0 fL Final     MCH   Date Value Ref Range Status   03/11/2021 30.4 26.6 - 33.0 pg Final     MCHC   Date Value Ref Range Status   03/11/2021 33.3 31.5 - 35.7 g/dL Final       RDW   Date Value Ref Range Status   03/11/2021 13.3 12.3 - 15.4 % Final     RDW-SD   Date Value Ref Range Status   03/11/2021 44.7 37.0 - 54.0 fl Final     MPV   Date Value Ref Range Status   03/11/2021 9.6 6.0 - 12.0 fL Final     Platelets   Date Value Ref Range Status   03/11/2021 354 140 - 450 10*3/mm3 Final     Neutrophil %   Date Value Ref Range Status   03/11/2021 83.1 (H) 42.7 - 76.0 % Final     Lymphocyte %   Date Value Ref Range Status   03/11/2021 6.2 (L) 19.6 - 45.3 % Final     Monocyte %   Date Value Ref Range Status   03/11/2021 7.7 5.0 - 12.0 % Final     Eosinophil %   Date Value Ref Range Status   03/11/2021 1.9 0.3 - 6.2 % Final     Basophil %   Date Value Ref Range Status   03/11/2021 0.4 0.0 - 1.5 % Final     Immature Grans %   Date Value Ref Range Status   03/11/2021 0.7 (H) 0.0 - 0.5 % Final     Neutrophils, Absolute   Date Value Ref Range Status   03/11/2021 11.43 (H) 1.70 - 7.00 10*3/mm3 Final     Lymphocytes, Absolute   Date Value Ref Range Status   03/11/2021 0.85 0.70 - 3.10 10*3/mm3 Final     Monocytes, Absolute   Date Value Ref Range Status   03/11/2021 1.06 (H) 0.10 - 0.90 10*3/mm3 Final     Eosinophils, Absolute   Date Value Ref Range Status   03/11/2021 0.26 0.00 - 0.40 10*3/mm3 Final     Basophils, Absolute   Date Value Ref Range Status   03/11/2021 0.05 0.00 - 0.20 10*3/mm3 Final     Immature Grans, Absolute   Date Value Ref Range Status   03/11/2021 0.09 (H) 0.00 - 0.05 10*3/mm3 Final     nRBC   Date Value Ref Range Status   03/11/2021 0.0 0.0 - 0.2 /100 WBC Final           A/P:  1. Left ankle cellulitis  2. DM 2    Patient seen at bedside.  CT reviewed.  Suspect findings of increased joint space and small air pockets just anterior to the ankle are more resultant of recent arthroscopy, however we will plan for irrigation debridement of left ankle with removal of residual talocalcaneal screw tomorrow morning.  All risk, benefits and potential complications discussed.  Leukocytosis  downtrending.  Posterior heel culture from office growing MSSA and Acinetobacter, continue Unasyn for now.          This document has been electronically signed by Ignacio Lord DPM on March 11, 2021 16:13 CST       Electronically signed by Ignacio Lord DPM at 03/11/21 1617             Ignacio Lord DPM at 03/11/21 1613          Podiatry/Surgery Progress Note    S:  Seen at bedside resting comfortably. NAD. Some continued left ankle pain.    O:  Vitals:    03/11/21 1531   BP: 131/60   Pulse: 90   Resp: 18   Temp: (!) 100.8 °F (38.2 °C)   SpO2: 96%       Physical Exam  Constitutional:       Appearance: She is obese.   HENT:      Head: Normocephalic and atraumatic.   Eyes:      General: Scleral icterus present.   Cardiovascular:      Rate and Rhythm: Normal rate and regular rhythm.      Pulses:           Dorsalis pedis pulses are 1+ on the left side.        Posterior tibial pulses are 1+ on the left side.   Pulmonary:      Effort: Pulmonary effort is normal.      Breath sounds: Normal breath sounds.   Musculoskeletal:      Left lower leg: Edema present.      Left foot: Decreased range of motion.        Feet:    Feet:      Right foot:      Protective Sensation: 0 sites sensed.      Left foot:      Protective Sensation: 0 sites sensed.      Skin integrity: Erythema present.   Neurological:      Mental Status: She is alert.   Psychiatric:         Mood and Affect: Mood normal.         Behavior: Behavior normal.              WBC   Date Value Ref Range Status   03/11/2021 13.74 (H) 3.40 - 10.80 10*3/mm3 Final     RBC   Date Value Ref Range Status   03/11/2021 3.36 (L) 3.77 - 5.28 10*6/mm3 Final     Hemoglobin   Date Value Ref Range Status   03/11/2021 10.2 (L) 12.0 - 15.9 g/dL Final     Hematocrit   Date Value Ref Range Status   03/11/2021 30.6 (L) 34.0 - 46.6 % Final     MCV   Date Value Ref Range Status   03/11/2021 91.1 79.0 - 97.0 fL Final     MCH   Date Value Ref Range Status   03/11/2021 30.4 26.6 -  33.0 pg Final     MCHC   Date Value Ref Range Status   03/11/2021 33.3 31.5 - 35.7 g/dL Final     RDW   Date Value Ref Range Status   03/11/2021 13.3 12.3 - 15.4 % Final     RDW-SD   Date Value Ref Range Status   03/11/2021 44.7 37.0 - 54.0 fl Final     MPV   Date Value Ref Range Status   03/11/2021 9.6 6.0 - 12.0 fL Final     Platelets   Date Value Ref Range Status   03/11/2021 354 140 - 450 10*3/mm3 Final     Neutrophil %   Date Value Ref Range Status   03/11/2021 83.1 (H) 42.7 - 76.0 % Final     Lymphocyte %   Date Value Ref Range Status   03/11/2021 6.2 (L) 19.6 - 45.3 % Final     Monocyte %   Date Value Ref Range Status   03/11/2021 7.7 5.0 - 12.0 % Final     Eosinophil %   Date Value Ref Range Status   03/11/2021 1.9 0.3 - 6.2 % Final     Basophil %   Date Value Ref Range Status   03/11/2021 0.4 0.0 - 1.5 % Final     Immature Grans %   Date Value Ref Range Status   03/11/2021 0.7 (H) 0.0 - 0.5 % Final     Neutrophils, Absolute   Date Value Ref Range Status   03/11/2021 11.43 (H) 1.70 - 7.00 10*3/mm3 Final     Lymphocytes, Absolute   Date Value Ref Range Status   03/11/2021 0.85 0.70 - 3.10 10*3/mm3 Final     Monocytes, Absolute   Date Value Ref Range Status   03/11/2021 1.06 (H) 0.10 - 0.90 10*3/mm3 Final     Eosinophils, Absolute   Date Value Ref Range Status   03/11/2021 0.26 0.00 - 0.40 10*3/mm3 Final     Basophils, Absolute   Date Value Ref Range Status   03/11/2021 0.05 0.00 - 0.20 10*3/mm3 Final     Immature Grans, Absolute   Date Value Ref Range Status   03/11/2021 0.09 (H) 0.00 - 0.05 10*3/mm3 Final     nRBC   Date Value Ref Range Status   03/11/2021 0.0 0.0 - 0.2 /100 WBC Final           A/P:  1. Left ankle cellulitis  2. DM 2    Patient seen at bedside.  CT reviewed.  Suspect findings of increased joint space and small air pockets just anterior to the ankle are more resultant of recent arthroscopy, however we will plan for irrigation debridement of left ankle with removal of residual talocalcaneal  screw tomorrow morning.  All risk, benefits and potential complications discussed.  Leukocytosis downtrending.  Posterior heel culture from office growing MSSA and Acinetobacter, continue Unasyn for now.          This document has been electronically signed by Ignacio Lord DPM on 2021 16:13 CST       Electronically signed by Ignacio Lord DPM at 21 1617     Kiara Whitt MD at 21 2131     Attestation signed by Tirso Herrera MD at 03/10/21 1135    I have performed a history and physical examination of the patient. I have discussed the management of the patient with the resident.  I have reviewed the notes, assessment and plan, and/or procedures  performed by the resident. I concur with the resident’s documentation.        Physical Exam:  General: NAD.  CV: S1 and S2 normal. RRR.  Pulmonary: Clear to auscultation bilaterally, no wheezing, no rales.   Abdomen: Bowel sounds present and normal. Abdomen is soft, obese, and nontender   Extremities: dressing noted over the LLE    Plan: 61 y.o. female with a CMH of coronary artery disease, depression, hypertension, type 2 diabetes mellitus and a recent left ankle arthroscopy, exostectomy, hardware removal of the left foot on 2021 admitted for treatment of Cellulitis. Pt on iv antibiotics. Podiatry consulted.                       HISTORY AND PHYSICAL  NAME: Ariana Martinez  : 1960  MRN: [id number]    DATE OF ADMISSION:  3/9/2021     DATE & TIME SEEN: 21 at 2131    PCP: Kimberly Ferguson APRN    CODE STATUS: Full code    CHIEF COMPLAINT: Leg pain and swelling    HPI:  Ariana Martinez is a 61 y.o. female with a CMH of coronary artery disease, depression, hypertension, type 2 diabetes mellitus and a recent left ankle arthroscopy, exostectomy, hardware removal of the left foot on 2021 with Dr. Lord (Podiatry) who presents to the ED with complaints of left lower extremity pain and swelling. Patient  "states that last night she was hobbling on one leg from her bedside commode to her bed when she heard a \"pop\" on her left foot. Patient had a clinic appointment with Dr. Lord today for follow-up of recent surgery where he completed some blood work. Given that patient developed worsening of lower extremity pain and swelling, Dr. Lord recommended that patient go to the ED to rule out DVT.     In the ED, patient was given Norco for pain, and started on Vanco and Zosyn.  She was also given 1 L bolus.  CTA chest did not show any evidence of PE.  Duplex venous ultrasound did not show any evidence of DVT.  Patient had a lactate of 2.1 and WBC of 22.      CONCURRENT MEDICAL HISTORY:  Past Medical History:   Diagnosis Date   • Angina, class IV (CMS/HCC)    • Anxiety    • Anxiety and depression    • Arthritis    • Benign paroxysmal positional vertigo    • Bladder disorder     has bladder stimulator   • Carpal tunnel syndrome    • CHF (congestive heart failure) (CMS/HCC)    • Chronic pain    • Coronary atherosclerosis     hx CABG 2005.  is followed by Dr Kwon   • Depression    • Diabetes mellitus (CMS/HCC)     Type 2, controlled   • Diabetic polyneuropathy (CMS/HCC)    • Disease of thyroid gland    • Elevated cholesterol    • Female stress incontinence    • Foot pain, left    • Full dentures    • Gastroparesis    • GERD (gastroesophageal reflux disease)    • Hyperlipidemia    • Hypertension    • Low back pain    • Malaise and fatigue    • Multiple joint pain    • Obesity     Refuses to be weighed   • Otalgia     Both   • Perforation of tympanic membrane     Left   • Postoperative wound infection    • Vitamin D deficiency    • Wears glasses     reading       PAST SURGICAL HISTORY:  Past Surgical History:   Procedure Laterality Date   • ABDOMINAL SURGERY     • ANGIOPLASTY      coronary   • ANKLE ARTHROSCOPY Left 2/26/2021    Procedure: Left foot hardware removal, ankle arthroscopy, bone grafting , left foot " exostectomy;  Surgeon: Ignacio Lord DPM;  Location: [facility] OR;  Service: Podiatry;  Laterality: Left;   • BREAST BIOPSY Right    • CARDIAC CATHETERIZATION     • CARDIAC CATHETERIZATION N/A 6/20/2017    Procedure: Right Heart Cath;  Surgeon: Can Kwon MD PhD;  Location: [facility] CATH INVASIVE LOCATION;  Service:    • CARDIAC CATHETERIZATION N/A 2/18/2020    Procedure: Left Heart Cath;  Surgeon: Catalina Cooper MD;  Location: [facility] CATH INVASIVE LOCATION;  Service: Cardiology;  Laterality: N/A;   • CARPAL TUNNEL RELEASE     • CHOLECYSTECTOMY     • COLONOSCOPY N/A 6/24/2020    Procedure: COLONOSCOPY;  Surgeon: Julián Maldonado MD;  Location: [facility] ENDOSCOPY;  Service: Gastroenterology;  Laterality: N/A;   • CORONARY ARTERY BYPASS GRAFT  2005   • ENDOSCOPY N/A 10/19/2018    Procedure: ESOPHAGOGASTRODUODENOSCOPY possible dilation;  Surgeon: Julián Maldonado MD;  Location: [facility] ENDOSCOPY;  Service: Gastroenterology   • ENDOSCOPY N/A 6/24/2020    Procedure: ESOPHAGOGASTRODUODENOSCOPY WED appt please;  Surgeon: Julián Maldonado MD;  Location: [facility] ENDOSCOPY;  Service: Gastroenterology;  Laterality: N/A;   • ENDOSCOPY AND COLONOSCOPY     • FOOT SURGERY      Toes   • FOOT SURGERY     • GASTRIC BANDING      Revision, laparoscopic   • HYSTERECTOMY     • MOUTH SURGERY     • SALPINGO OOPHORECTOMY     • SHOULDER SURGERY     • SUBTALAR ARTHRODESIS Left 1/16/2019    Procedure: LEFT FOOT HARDWARE REMOVAL, FIFTH METATARSAL , OPEN REDUCTION INTERNAL FIXATION, CALCANEAL OSTEOTOMY;  Surgeon: Ignacio Lord DPM;  Location: [facility] OR;  Service: Podiatry   • SUBTALAR ARTHRODESIS Left 10/16/2019    Procedure: foot hardware removal, subtalar joint fusion  possible de/reattachment of achilles tendon        (c-arm);  Surgeon: Ignacio Lord DPM;  Location: [facility] OR;  Service: Podiatry   • SUBTALAR ARTHRODESIS Left 9/30/2020    Procedure: subtalar, talonavicular joint arthrodesis.  Removal hardware.           (c-arm);  Surgeon: Ignacio Lord DPM;  Location: [facility];  Service: Podiatry;  Laterality: Left;   • TRANSESOPHAGEAL ECHOCARDIOGRAM (LAMONTE)      With color flow       FAMILY HISTORY:  Family History   Problem Relation Age of Onset   • Diabetes Other    • Heart disease Other    • Hypertension Other    • Heart disease Mother    • Stroke Mother    • Hypertension Mother    • Diabetes Sister    • Heart disease Sister    • Hypertension Sister    • Heart disease Sister    • Diabetes Sister    • Hypertension Sister    • Diabetes Sister    • Diabetes Sister    • Diabetes Sister    • Diabetes Sister         SOCIAL HISTORY:  Social History     Socioeconomic History   • Marital status:      Spouse name: Not on file   • Number of children: Not on file   • Years of education: Not on file   • Highest education level: Not on file   Tobacco Use   • Smoking status: Never Smoker   • Smokeless tobacco: Never Used   Vaping Use   • Vaping Use: Never used   Substance and Sexual Activity   • Alcohol use: No   • Drug use: No   • Sexual activity: Defer       HOME MEDICATIONS:  Prior to Admission medications    Medication Sig Start Date End Date Taking? Authorizing Provider   Accu-Chek Iris Plus test strip USE TO CHECK BLOOD SUGAR 4 TIMES DAILY. ACCUCHECK, E11.9 1/13/21   Elvis Gamboa APRN   ARIPiprazole (ABILIFY) 15 MG tablet Take 1 tablet by mouth Daily. 3/14/19   Francisca Fong MD   aspirin 81 MG chewable tablet Chew 81 mg daily.    Provider, MD Esther   atorvastatin (LIPITOR) 80 MG tablet TAKE 1 TABLET BY MOUTH EVERY DAY 12/22/20   Kimberly Ferguson APRN   BD SHARPS CONTAINER HOME misc 1 each Take As Directed. 9/7/18   Francisca Fong MD   Blood Glucose Monitoring Suppl (ONE TOUCH ULTRA MINI) w/Device kit USE AS DIRECTED TO CHECK BLOOD SUGAR 7/11/18   Francisca Fong MD   Calcium Citrate-Vitamin D (CITRACAL/VITAMIN D) 250-200 MG-UNIT tablet Take 2 tablets by mouth 2 (two) times a day.     Provider, MD Esther   clopidogrel (PLAVIX) 75 MG tablet Take 1 tablet by mouth Daily. 12/23/20   Geri Baig MD   cyanocobalamin 1000 MCG/ML injection INJECT 1 ML INTO THE APPROPRIATE MUSCLE AS DIRECTED BY PRESCRIBER EVERY 28 (TWENTY-EIGHT) DAYS. 12/23/20   Geri Baig MD   doxycycline (VIBRAMYCIN) 100 MG capsule Take 1 capsule by mouth 2 (Two) Times a Day. 3/9/21   Ignacio Lord DPM   folic acid (FOLVITE) 1 MG tablet Take 1 tablet by mouth Daily. 12/22/20   Teressa Hammond APRN   furosemide (LASIX) 40 MG tablet TAKE 1 TABLET BY MOUTH EVERY DAY  Patient taking differently: Take 40 mg by mouth Daily. 8/31/20   Marty, BEBE Luu   gabapentin (NEURONTIN) 400 MG capsule Take 1 capsule by mouth 3 (Three) Times a Day. 1/31/21   Kimberly Ferguson APRN   GLUCAGON EMERGENCY 1 MG injection USE AS DIRECTED AS NEEDED 7/28/17   Elvis Gamboa APRN   glucose monitor monitoring kit 1 each Daily. accucheck eve meter, E11.9 6/11/20   Elvis Gamboa APRN   hydroCHLOROthiazide (HYDRODIURIL) 12.5 MG tablet Take 12.5 mg by mouth Daily.    Provider, MD Esther   Insulin Glargine (BASAGLAR KWIKPEN) 100 UNIT/ML injection pen Inject 100 units under skin in the the appropriate area as directed every night.   Patient taking differently: 60 Units Every Night. 6/9/20   Elvis Gamboa APRN   Insulin Pen Needle (B-D ULTRAFINE III SHORT PEN) 31G X 8 MM misc USE TO INJECT 4 TIMES A DAY 12/27/20   Elvis Gamboa APRN   LORazepam (ATIVAN) 0.5 MG tablet TAKE 1 TABLET BY MOUTH EVERY DAY AS NEEDED FOR ANXIETY 3/8/21   Kimberly Ferguson APRN   meclizine (ANTIVERT) 25 MG tablet Take 1 tablet by mouth 3 (Three) Times a Day As Needed for dizziness. 12/14/17   Evon Hernandez APRN   metoclopramide (REGLAN) 10 MG tablet TAKE 1 TABLET BY MOUTH FOUR TIMES A DAY AS NEEDED 1/27/21   Marcela Lawson APRN   metoprolol succinate XL (TOPROL-XL) 25 MG 24 hr tablet TAKE 1 TABLET BY MOUTH  "EVERY DAY  Patient taking differently: Take 25 mg by mouth Daily. 6/1/20   Bill Gibson MD   mirtazapine (REMERON) 45 MG tablet TAKE 1 TABLET BY MOUTH EVERY NIGHT. 7/17/19   Francisca Fong MD   NOVOLOG FLEXPEN 100 UNIT/ML solution pen-injector sc pen INJECT 60 UNITS BEFORE MEALS 3 TIMES A DAY  Patient taking differently: Inject 60 Units under the skin into the appropriate area as directed 3 (Three) Times a Day With Meals. 4/6/20   Baldemar Melendez MD   ondansetron (ZOFRAN) 8 MG tablet TAKE 1 TABLET BY MOUTH EVERY 8 (EIGHT) HOURS AS NEEDED FOR NAUSEA OR VOMITING. 1/4/21   Ferguson, BEBE Luu   pantoprazole (PROTONIX) 20 MG EC tablet TAKE 1 TABLET BY MOUTH EVERY DAY  Patient taking differently: Take 20 mg by mouth Daily. 12/23/20   Geri Baig MD   Syringe/Needle, Disp, (Luer Lock Safety Syringes) 25G X 5/8\" 3 ML misc 1 each Daily. 1 Q28 DAYS 4/16/20   Ferguson, BEBE Luu   traMADol (ULTRAM) 50 MG tablet Take 1 tablet by mouth Every 6 (Six) Hours As Needed for Moderate Pain  (pain). 3/2/21   Ignacio Lord DPM   venlafaxine XR (EFFEXOR-XR) 150 MG 24 hr capsule TAKE 1 CAPSULE BY MOUTH TWICE A DAY 3/8/21   Ferguson, BEBE Luu   vitamin D (ERGOCALCIFEROL) 1.25 MG (60182 UT) capsule capsule TAKE ONE CAPSULE BY MOUTH EVERY SUNDAY.  Patient taking differently: Take 50,000 Units by mouth Every 7 (Seven) Days. 8/24/20   Ferguson, BEBE Luu       ALLERGIES:  Seroquel [quetiapine], Avandia [rosiglitazone], Morphine and related, and Oxycodone    REVIEW OF SYSTEMS  Review of Systems   Constitutional: Negative for chills and fever.   HENT: Negative for facial swelling and trouble swallowing.    Eyes: Negative for photophobia and visual disturbance.   Respiratory: Positive for shortness of breath. Negative for apnea, cough and chest tightness.    Cardiovascular: Positive for leg swelling. Negative for chest pain.        Left leg swelling   Gastrointestinal: Negative for " abdominal distention, abdominal pain, constipation, diarrhea, nausea and vomiting.   Genitourinary: Negative for pelvic pain.   Musculoskeletal: Positive for gait problem. Negative for arthralgias and myalgias.   Neurological: Negative for dizziness, seizures, speech difficulty and weakness.   Psychiatric/Behavioral: Negative for confusion and hallucinations.       PHYSICAL EXAM:  Temp:  [98.1 °F (36.7 °C)-98.8 °F (37.1 °C)] 98.2 °F (36.8 °C)  Heart Rate:  [] 103  Resp:  [18] 18  BP: (120-145)/(54-70) 145/66  Body mass index is 42.1 kg/m².  Physical Exam  Constitutional:       Appearance: She is well-developed.   HENT:      Head: Normocephalic and atraumatic.      Nose: Nose normal.   Eyes:      Conjunctiva/sclera: Conjunctivae normal.      Pupils: Pupils are equal, round, and reactive to light.   Cardiovascular:      Rate and Rhythm: Normal rate and regular rhythm.      Pulses: Normal pulses.      Heart sounds: Normal heart sounds.   Pulmonary:      Effort: Pulmonary effort is normal. No respiratory distress.      Breath sounds: Normal breath sounds.   Abdominal:      General: Abdomen is flat. Bowel sounds are normal. There is no distension.      Palpations: Abdomen is soft.   Musculoskeletal:         General: Normal range of motion.      Cervical back: Normal range of motion and neck supple.      Left lower leg: Edema present.   Skin:     General: Skin is warm and dry.   Neurological:      General: No focal deficit present.      Mental Status: She is alert and oriented to person, place, and time. Mental status is at baseline.      Cranial Nerves: No cranial nerve deficit.   Psychiatric:         Mood and Affect: Mood normal.         Behavior: Behavior normal.         DIAGNOSTIC DATA:   Lab Results (last 24 hours)     Procedure Component Value Units Date/Time    Procalcitonin [[id number]]  (Abnormal) Collected: 03/09/21 1839    Specimen: Blood Updated: 03/10/21 0022     Procalcitonin 0.53 ng/mL     Narrative:    "   As a Marker for Sepsis (Non-Neonates):   1. <0.5 ng/mL represents a low risk of severe sepsis and/or septic shock.  1. >2 ng/mL represents a high risk of severe sepsis and/or septic shock.    As a Marker for Lower Respiratory Tract Infections that require antibiotic therapy:  PCT on Admission     Antibiotic Therapy             6-12 Hrs later  > 0.5                Strongly Recommended            >0.25 - <0.5         Recommended  0.1 - 0.25           Discouraged                   Remeasure/reassess PCT  <0.1                 Strongly Discouraged          Remeasure/reassess PCT      As 28 day mortality risk marker: \"Change in Procalcitonin Result\" (> 80 % or <=80 %) if Day 0 (or Day 1) and Day 4 values are available. Refer to http://www.Technical Machinepct-calculator[employer]/   Change in PCT <=80 %   A decrease of PCT levels below or equal to 80 % defines a positive change in PCT test result representing a higher risk for 28-day all-cause mortality of patients diagnosed with severe sepsis or septic shock.  Change in PCT > 80 %   A decrease of PCT levels of more than 80 % defines a negative change in PCT result representing a lower risk for 28-day all-cause mortality of patients diagnosed with severe sepsis or septic shock.                Results may be falsely decreased if patient taking Biotin.     POC Glucose Once [[id number]]  (Abnormal) Collected: 03/09/21 2225    Specimen: Blood Updated: 03/09/21 2237     Glucose 295 mg/dL      Comment: RN NotifiedOperator: [id number] PEGGY Garcia ID: [id number]       Extra Tubes [[id number]] Collected: 03/09/21 1938    Specimen: Blood, Venous Line Updated: 03/09/21 2045    Narrative:      The following orders were created for panel order Extra Tubes.  Procedure                               Abnormality         Status                     ---------                               -----------         ------                     Gold Top - [facility][[id number]]                                   " Final result                 Please view results for these tests on the individual orders.    Gold Top - [facility] [[id number]] Collected: 03/09/21 1938    Specimen: Blood Updated: 03/09/21 2045     Extra Tube Hold for add-ons.     Comment: Auto resulted.       Extra Tubes [[id number]] Collected: 03/09/21 1912    Specimen: Blood, Venous Line Updated: 03/09/21 2015    Narrative:      The following orders were created for panel order Extra Tubes.  Procedure                               Abnormality         Status                     ---------                               -----------         ------                     Light Blue Top[[id number]]                                   Final result                 Please view results for these tests on the individual orders.    Light Blue Top [[id number]] Collected: 03/09/21 1912    Specimen: Blood Updated: 03/09/21 2015     Extra Tube hold for add-on     Comment: Auto resulted       COVID-19 and FLU A/B PCR - Swab, Nasopharynx [[id number]]  (Normal) Collected: 03/09/21 1922    Specimen: Swab from Nasopharynx Updated: 03/09/21 2003     COVID19 Not Detected     Influenza A PCR Not Detected     Influenza B PCR Not Detected    Narrative:      Fact sheet for providers: https://www.fda.gov/media/209192/download    Fact sheet for patients: https://www.fda.gov/media/520903/download    Test performed by PCR.    Lactic Acid, Plasma [[id number]]  (Abnormal) Collected: 03/09/21 1931    Specimen: Blood Updated: 03/09/21 2000     Lactate 2.1 mmol/L     Blood Culture - Blood, Blood, Venous Line [[id number]] Collected: 03/09/21 1931    Specimen: Blood, Venous Line Updated: 03/09/21 1942    Blood Culture - Blood, Blood, Venous Line [[id number]] Collected: 03/09/21 1931    Specimen: Blood, Venous Line Updated: 03/09/21 1939    Comprehensive Metabolic Panel [[id number]]  (Abnormal) Collected: 03/09/21 1839    Specimen: Blood Updated: 03/09/21 1909     Glucose 290 mg/dL      BUN 12 mg/dL      Creatinine 1.09  mg/dL      Sodium 133 mmol/L      Potassium 4.9 mmol/L      Comment: Slight hemolysis detected by analyzer. Results may be affected.        Chloride 95 mmol/L      CO2 25.0 mmol/L      Calcium 8.7 mg/dL      Total Protein 6.9 g/dL      Albumin 3.20 g/dL      ALT (SGPT) 28 U/L      AST (SGOT) 27 U/L      Alkaline Phosphatase 138 U/L      Total Bilirubin 1.2 mg/dL      eGFR Non African Amer 51 mL/min/1.73      Globulin 3.7 gm/dL      A/G Ratio 0.9 g/dL      BUN/Creatinine Ratio 11.0     Anion Gap 13.0 mmol/L     Narrative:      GFR Normal >60  Chronic Kidney Disease <60  Kidney Failure <15      Troponin [[id number]]  (Normal) Collected: 03/09/21 1839    Specimen: Blood Updated: 03/09/21 1904     Troponin T <0.010 ng/mL     Narrative:      Troponin T Reference Range:  <= 0.03 ng/mL-   Negative for AMI  >0.03 ng/mL-     Abnormal for myocardial necrosis.  Clinicians would have to utilize clinical acumen, EKG, Troponin and serial changes to determine if it is an Acute Myocardial Infarction or myocardial injury due to an underlying chronic condition.       Results may be falsely decreased if patient taking Biotin.      BNP [[id number]]  (Normal) Collected: 03/09/21 1839    Specimen: Blood Updated: 03/09/21 1903     proBNP 714.7 pg/mL     Narrative:      Among patients with dyspnea, NT-proBNP is highly sensitive for the detection of acute congestive heart failure. In addition NT-proBNP of <300 pg/ml effectively rules out acute congestive heart failure with 99% negative predictive value.    Results may be falsely decreased if patient taking Biotin.      CBC & Differential [[id number]]  (Abnormal) Collected: 03/09/21 1839    Specimen: Blood Updated: 03/09/21 1850    Narrative:      The following orders were created for panel order CBC & Differential.  Procedure                               Abnormality         Status                     ---------                               -----------         ------                     CBC  Auto Differential[[id number]]        Abnormal            Final result                 Please view results for these tests on the individual orders.    CBC Auto Differential [[id number]]  (Abnormal) Collected: 03/09/21 1839    Specimen: Blood Updated: 03/09/21 1850     WBC 22.39 10*3/mm3      RBC 3.98 10*6/mm3      Hemoglobin 12.1 g/dL      Hematocrit 35.6 %      MCV 89.4 fL      MCH 30.4 pg      MCHC 34.0 g/dL      RDW 13.2 %      RDW-SD 42.8 fl      MPV 9.3 fL      Platelets 432 10*3/mm3      Neutrophil % 81.7 %      Lymphocyte % 5.6 %      Monocyte % 11.1 %      Eosinophil % 0.2 %      Basophil % 0.3 %      Immature Grans % 1.1 %      Neutrophils, Absolute 18.28 10*3/mm3      Lymphocytes, Absolute 1.26 10*3/mm3      Monocytes, Absolute 2.49 10*3/mm3      Eosinophils, Absolute 0.05 10*3/mm3      Basophils, Absolute 0.07 10*3/mm3      Immature Grans, Absolute 0.24 10*3/mm3      nRBC 0.0 /100 WBC            Imaging Results (Last 24 Hours)     Procedure Component Value Units Date/Time    CT Angiogram Chest [[id number]] Collected: 03/09/21 2001     Updated: 03/09/21 2056    Narrative:      PROCEDURE: CT ANGIOGRAM CHEST      .        EXAM:  Computed Tomography with CTA         REGION:  Chest         INDICATION:   Chest pain, tachycardia, cellulitis of lower  extremity, sepsis  - rule out pulmonary embolism         CORRELATIVE IMAGING:  None                          TECHNIQUE:             - PE / vascular protocol               - reconstructions:  axial, coronal, sagittal, obliques     - computer-generated 3D reconstructions (MIPS) were performed.                - contrast:  intravenous 90 mL of Isovue-370                         This exam was performed according to our departmental  dose-optimization program, which includes automated exposure  control, adjustment of the mA and/or kV according to patient size  and/or use of iterative reconstruction technique.  DLP is 713.0              COMMENTS:                                - Pulmonary arterial system:      - Main pulmonary artery trunk:  negative      - Left, right main pulmonary arteries: negative      - Lobar arteries: negative      - Segmental arteries: Limited evaluation of segmental  branches due to contrast phase       - Systemic vascularity (as visualized):        - Aorta: Normal caliber. Atherosclerotic vascular  calcification      - roots of great vessels: Normal caliber.      - SVC / IVC:  grossly negative / normal caliber         - Misc (limited visualization):      - pulmonary parenchyma:  10 year to band like opacities in  the lower lobes bilaterally, likely representing atelectasis or  scarring. Right hemidiaphragm is elevated      - pleura:  negative      - mediastinal / maria guadalupe:  negative      - neck, inferior:  grossly wnl      - subdiaphragmatic structures: The liver appears prominent.  There is pneumobilia, probably secondary to sphincterotomy. The  gallbladder is surgically absent  - osseous:  No acute abnormality identified            .      Impression:      1.  No evidence of pulmonary embolism, though there is limited  evaluation of segmental branches due to contrast phase.          2.  No evidence of acute pathology associated with the visualized  aorta.      3. Linear to bandlike opacities in the upper lung zones probably  represent atelectasis and/or scarring  4. Liver appears prominent, but is incompletely included in the  study.  5. Pneumobilia, probably secondary to cholecystectomy and  sphincterotomy.    Electronically signed by:  Janki Molina MD  3/9/2021 8:54 PM CST  Workstation: [id number]    US Venous Doppler Lower Extremity Left (duplex) [[id number]] Collected: 03/09/21 1743     Updated: 03/09/21 1752    Narrative:      Doppler duplex venous examination left lower extremity.     CLINICAL INDICATION: Left leg pain.          COMPARISON: None    FINDINGS:    The common femoral,  femoral, and popliteal veins of the left      lower extremity are well  identified and compress normally.   Doppler signals are heard either spontaneously or on distal  compression.  No evidence of intraluminal thrombus was noted.     The visualized posterior calf veins are normal.      Impression:      No evidence of deep venous thrombosis in the common  femoral, femoral, or popliteal veins of the left     lower  extremity.    Electronically signed by:  Naveed Taylor MD  3/9/2021 5:49 PM CST  Workstation: [id number]            I reviewed the patient's new clinical results.    ASSESSMENT AND PLAN: This is a 61 y.o. female with:    Active Hospital Problems    Diagnosis  POA   • **Sepsis (CMS/HCC) [A41.9]  Unknown     Priority: High     Results from last 7 days   Lab Units 03/09/21  1931 03/09/21  1839   WBC 10*3/mm3  --  22.39*   LACTATE mmol/L 2.1*  --    -Sepsis secondary to left lower extremity cellulitis  -Given 1 L bolus in the ED  -Reflex lactate pending  -Vanc and Zosyn  -We will obtain urinalysis with cultures  -We will obtain blood culture  -We will obtain pro-Carlito       • Cellulitis of left lower extremity [L03.116]  Yes     Priority: High     -CTA chest shows no evidence of pulmonary embolism per radiologist interpretation  -Duplex venous ultrasound showed no evidence of DVT per radiology interpretation  -Vanc and Zosyn  -Dr. 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  The goal is to keep the pain level 2-5/10.  To help achieve this, I plan to use prn tylenol.    LESTER reviewed via PDMP and consistent with patient reported medications.    Expected Length of Stay: home in 0-1 days    I discussed the patient's findings and my recommendations with patient.     Tirso Herrera MD is the attending on record at time of admission, He is aware of the patient's status and agrees with the above history and physical.              This document has been electronically signed by Kiara Whitt MD on March 10, 2021 04:35 CST          Electronically signed by Tirso Herrera MD at 03/10/21 1135       Vital Signs (last day)     Date/Time   Temp   Temp src   Pulse   Resp   BP   Patient Position   SpO2    03/13/21 1551   --   --   73   --   --   --   --    03/13/21 1550   97.2 (36.2)   Oral   71   18   144/68   Sitting   100    03/13/21 1138   96.2 (35.7)   Oral   68   18   127/60   Sitting   96    03/13/21 0711   96.5 (35.8)   Axillary   64   18   101/57   Lying   95    03/13/21 0704   --   --   66   --   --   --   --    03/13/21 0459   96.7 (35.9)   Axillary   56   18   120/56   Lying   96    03/13/21 0014   --   --   64   --   --   --   --    03/13/21 0013   96.6 (35.9)   Axillary   72   18   150/69   Lying   97    03/12/21 1909   97.4 (36.3)   Axillary   75   20   139/63   Lying   92    03/12/21 1600   --   --   80   20   --   --   95    03/12/21 1531   --   --   82   --   --   --   --    03/12/21 1500   96.9 (36.1)   --   86   20   147/67   Lying   98    03/12/21 1435   98 (36.7)   Oral   82   20   149/65   --   98    03/12/21 1400   98.1 (36.7)   --   79   20   153/72   --   98    03/12/21 6563    --   --   --   --   (!) 200/82   --   --    03/12/21 1348   --   --   93   20   (!) 200/82   --   99    03/12/21 1334   --   --   93   20   (!) 187/81   --   99    03/12/21 1319   97.6 (36.4)   --   96   18   147/79   --   100    03/12/21 1002   --   --   90   --   --   --   --    03/12/21 0957   98.8 (37.1)   Temporal   88   24   168/74   Lying   92    03/12/21 0636   99.3 (37.4)   Oral   --   --   --   --   --    03/12/21 0444   99.7 (37.6)   Oral   --   --   --   --   --    03/12/21 0318   (!) 100.6 (38.1)   Oral   91   18   156/67   Lying   95    03/12/21 0118   --   --   84   --   --   --   --    03/12/21 0100   97.9 (36.6)   Oral   --   --   --   --   --                Current Facility-Administered Medications   Medication Dose Route Frequency Provider Last Rate Last Admin   • acetaminophen (TYLENOL) tablet 650 mg  650 mg Oral Q4H PRN Ignacio Lord DPM   650 mg at 03/12/21 0322   • ARIPiprazole (ABILIFY) tablet 15 mg  15 mg Oral Daily Ignacio Lord DPM   15 mg at 03/13/21 0815   • aspirin EC tablet 81 mg  81 mg Oral Daily Ignacio Lord DPM   81 mg at 03/13/21 0815   • atorvastatin (LIPITOR) tablet 80 mg  80 mg Oral Daily Ignacio Lord DPM   80 mg at 03/13/21 0815   • bisacodyl (DULCOLAX) EC tablet 5 mg  5 mg Oral Daily PRN Ignacio Lord DPM       • ceFAZolin in 0.9% normal saline (ANCEF) IVPB solution 2 g  2 g Intravenous Q8H Nick Faye  mL/hr at 03/13/21 1725 2 g at 03/13/21 1725   • clopidogrel (PLAVIX) tablet 75 mg  75 mg Oral Daily Ignacio Lord DPM   75 mg at 03/13/21 0816   • dextrose (D50W) 25 g/ 50mL Intravenous Solution 25 g  25 g Intravenous Q15 Min PRN Ignacio Lord DPM       • dextrose (D50W) 25 g/ 50mL Intravenous Solution 25 g  25 g Intravenous Q15 Min PRN Ignacio Lord DPM   25 g at 03/12/21 0645   • dextrose (GLUTOSE) oral gel 15 g  15 g Oral Q15 Min PRN Ignacio Lord DPM       • enoxaparin (LOVENOX) syringe 40 mg  40 mg  Subcutaneous Q12H Landen Naranjo MD   40 mg at 03/13/21 0817   • furosemide (LASIX) tablet 40 mg  40 mg Oral Daily Ignacio Lord DPM   40 mg at 03/13/21 0816   • gabapentin (NEURONTIN) capsule 400 mg  400 mg Oral TID Ignacio Lord DPM   400 mg at 03/13/21 1714   • glucagon (human recombinant) (GLUCAGEN DIAGNOSTIC) injection 1 mg  1 mg Subcutaneous Q15 Min PRN Ignacio Lord DPM       • guaiFENesin-dextromethorphan (ROBITUSSIN DM) 100-10 MG/5ML syrup 5 mL  5 mL Oral Q4H PRN Ignacio Lord DPM   5 mL at 03/12/21 2124   • HYDROcodone-acetaminophen (NORCO)  MG per tablet 1 tablet  1 tablet Oral Q6H PRN Ignacio Lord DPM   1 tablet at 03/13/21 1714   • HYDROmorphone (DILAUDID) injection 0.5 mg  0.5 mg Intravenous Q2H PRN Ignacio Lord DPM        And   • naloxone (NARCAN) injection 0.2 mg  0.2 mg Intravenous Q5 Min PRN Ignacio Lord DPM       • ibuprofen (ADVIL,MOTRIN) tablet 500 mg  500 mg Oral Q6H PRN Ignacio Lord DPM   500 mg at 03/11/21 0309   • insulin aspart (novoLOG) injection 0-7 Units  0-7 Units Subcutaneous TID AC Ignacio Lord DPM   2 Units at 03/13/21 1140   • insulin detemir (LEVEMIR) injection 60 Units  60 Units Subcutaneous Nightly Ignacio Lord DPM   60 Units at 03/12/21 2126   • levoFLOXacin (LEVAQUIN) 750 mg/150 mL D5W (premix) (LEVAQUIN) 750 mg  750 mg Intravenous Q24H Nick Faye MD       • LORazepam (ATIVAN) tablet 0.5 mg  0.5 mg Oral Daily PRN Ignacio Lord DPM   0.5 mg at 03/13/21 1716   • meclizine (ANTIVERT) tablet 25 mg  25 mg Oral TID PRN Ignacio Lord DPM       • melatonin tablet 5.25 mg  5.25 mg Oral Nightly PRN Ignacio Lord DPM       • metoclopramide (REGLAN) tablet 10 mg  10 mg Oral 4x Daily PRN Ignacio Lord DPM       • metoprolol succinate XL (TOPROL-XL) 24 hr tablet 25 mg  25 mg Oral Daily Ignacio Lord DPM   25 mg at 03/13/21 0815   • mirtazapine (REMERON) tablet 45 mg  45 mg  Oral Nightly Ignacio Lord DPM   45 mg at 03/11/21 2022   • ondansetron (ZOFRAN) injection 4 mg  4 mg Intravenous Q6H PRN Ignacio Lord DPM   4 mg at 03/12/21 1302   • ondansetron (ZOFRAN) tablet 8 mg  8 mg Oral Q8H PRN Ignacio Lord DPM   8 mg at 03/11/21 1045   • pantoprazole (PROTONIX) EC tablet 20 mg  20 mg Oral Daily Ignacio Lord DPM   20 mg at 03/13/21 0816   • sodium chloride 0.9 % flush 10 mL  10 mL Intravenous PRN Ignacio Lord DPM       • sodium chloride 0.9 % flush 10 mL  10 mL Intravenous Q12H Ignacio Lord DPM   10 mL at 03/13/21 0816   • sodium chloride 0.9 % flush 10 mL  10 mL Intravenous PRN Ignacio Lord DPM       • sodium chloride 0.9 % infusion  100 mL/hr Intravenous Continuous Ignacio Lord  mL/hr at 03/13/21 0153 100 mL/hr at 03/13/21 0153   • sodium chloride 0.9% - IBW for BMI > 30 bolus 1,917 mL  30 mL/kg (Ideal) Intravenous Once Ignacio Lord DPM   Stopped at 03/09/21 2316   • venlafaxine XR (EFFEXOR-XR) 24 hr capsule 150 mg  150 mg Oral Daily With Breakfast Ignacio Lord DPM   150 mg at 03/13/21 0816        Physician Progress Notes (last 24 hours) (Notes from 03/12/21 1733 through 03/13/21 1733)      Ignacio Lord DPM at 03/13/21 1123          Podiatry/Surgery Progress Note    S:  Seen at bedside resting comfortably. NAD. States she slept better overnight.    O:  Vitals:    03/13/21 0711   BP: 101/57   Pulse: 64   Resp: 18   Temp: 96.5 °F (35.8 °C)   SpO2: 95%       Physical Exam  Constitutional:       Appearance: She is obese.   HENT:      Head: Normocephalic and atraumatic.   Eyes:      General: Scleral icterus present.   Cardiovascular:      Rate and Rhythm: Normal rate and regular rhythm.      Pulses:           Dorsalis pedis pulses are 1+ on the left side.        Posterior tibial pulses are 1+ on the left side.   Pulmonary:      Effort: Pulmonary effort is normal.      Breath sounds: Normal breath sounds.    Musculoskeletal:      Left lower leg: Edema present.      Left foot: Decreased range of motion.        Feet:    LLE posterior splint c/d/i   Toes pink. CFT wnl. Mild ecchymosis to proximal nail fold of left 2nd toe        WBC   Date Value Ref Range Status   03/13/2021 11.47 (H) 3.40 - 10.80 10*3/mm3 Final     RBC   Date Value Ref Range Status   03/13/2021 3.33 (L) 3.77 - 5.28 10*6/mm3 Final     Hemoglobin   Date Value Ref Range Status   03/13/2021 10.0 (L) 12.0 - 15.9 g/dL Final     Hematocrit   Date Value Ref Range Status   03/13/2021 30.2 (L) 34.0 - 46.6 % Final     MCV   Date Value Ref Range Status   03/13/2021 90.7 79.0 - 97.0 fL Final     MCH   Date Value Ref Range Status   03/13/2021 30.0 26.6 - 33.0 pg Final     MCHC   Date Value Ref Range Status   03/13/2021 33.1 31.5 - 35.7 g/dL Final     RDW   Date Value Ref Range Status   03/13/2021 13.6 12.3 - 15.4 % Final     RDW-SD   Date Value Ref Range Status   03/13/2021 45.4 37.0 - 54.0 fl Final     MPV   Date Value Ref Range Status   03/13/2021 9.6 6.0 - 12.0 fL Final     Platelets   Date Value Ref Range Status   03/13/2021 374 140 - 450 10*3/mm3 Final     Neutrophil %   Date Value Ref Range Status   03/13/2021 77.3 (H) 42.7 - 76.0 % Final     Lymphocyte %   Date Value Ref Range Status   03/13/2021 11.6 (L) 19.6 - 45.3 % Final     Monocyte %   Date Value Ref Range Status   03/13/2021 8.9 5.0 - 12.0 % Final     Eosinophil %   Date Value Ref Range Status   03/13/2021 1.1 0.3 - 6.2 % Final     Basophil %   Date Value Ref Range Status   03/13/2021 0.6 0.0 - 1.5 % Final     Immature Grans %   Date Value Ref Range Status   03/13/2021 0.5 0.0 - 0.5 % Final     Neutrophils, Absolute   Date Value Ref Range Status   03/13/2021 8.86 (H) 1.70 - 7.00 10*3/mm3 Final     Lymphocytes, Absolute   Date Value Ref Range Status   03/13/2021 1.33 0.70 - 3.10 10*3/mm3 Final     Monocytes, Absolute   Date Value Ref Range Status   03/13/2021 1.02 (H) 0.10 - 0.90 10*3/mm3 Final      Eosinophils, Absolute   Date Value Ref Range Status   03/13/2021 0.13 0.00 - 0.40 10*3/mm3 Final     Basophils, Absolute   Date Value Ref Range Status   03/13/2021 0.07 0.00 - 0.20 10*3/mm3 Final     Immature Grans, Absolute   Date Value Ref Range Status   03/13/2021 0.06 (H) 0.00 - 0.05 10*3/mm3 Final     nRBC   Date Value Ref Range Status   03/13/2021 0.0 0.0 - 0.2 /100 WBC Final           A/P:  1. Septic left ankle s/p irrigation and debridement. POD 1  2. DM 2    Patient doing fair following I&D of left ankle. Intraop micro and path pending. Continue unasyn. Trend CBC, CRP. Plan for splint/wound packing change tomorrow. May require additional irrigation and debridement this stay. NWB to left foot. Plan for 6 weeks IV abx as outpatient.          This document has been electronically signed by Ignacio Lord DPM on March 13, 2021 11:23 CST       Electronically signed by Ignacio Lord DPM at 03/13/21 1131     Nick Faye MD at 03/13/21 0620     Attestation signed by Noemi Jones MD at 03/13/21 1527    I have seen and evaluated the patient on 3/13/2021 at 11:23 am.  I have discussed the case with the resident. I have reviewed the notes, assessment and plan, and/or procedures performed by the resident. I concur with the resident’s documentation.   Patient had cleanout of her left ankle which revealed catalino pus.  Cultures are pending.  She endorses feeling fatigued.  Nursing is concerned about her second digit appearing dusky.    Physical Exam:  General: NAD.  CV: S1 and S2 normal. RRR.  Pulmonary: Clear to auscultation bilaterally  Abdomen: Soft nontender nondistended  Extremities: No lower extremity edema.  Left foot dressed.  Second digit bruised.    Plan: Continue IV antibiotics.  Await cultures.      This document has been electronically signed by Noemi Jones MD on March 13, 2021 15:25 CST                          FAMILY MEDICINE DAILY PROGRESS NOTE    NAME: Ariana Luis  Juan  : 1960  MRN: [id number]      LOS: 4 days     PROVIDER OF SERVICE: Nick Faye MD    Chief Complaint: Staphylococcus aureus bacteremia with sepsis (CMS/[facility])    Subjective:     Interval History:  History taken from: patient chart    C/o left ankle pain. Hardware removed yesterday with Dr. Lord. Podiatry following. Ampicillin for staph bacteremia.     Review of Systems:   Review of Systems   Constitutional: Negative for appetite change, diaphoresis, fatigue and fever.   HENT: Negative for ear pain, postnasal drip, rhinorrhea and sore throat.    Eyes: Negative for pain and visual disturbance.   Respiratory: Negative for cough, chest tightness and shortness of breath.    Cardiovascular: Negative for chest pain, palpitations and leg swelling.   Gastrointestinal: Negative for abdominal pain, constipation, diarrhea, nausea and vomiting.   Genitourinary: Negative for dysuria, flank pain, frequency and urgency.   Musculoskeletal: Positive for arthralgias. Negative for back pain and myalgias.   Skin: Negative for pallor and rash.   Neurological: Negative for dizziness, seizures, syncope, weakness and numbness.   Psychiatric/Behavioral: Negative for agitation, confusion, decreased concentration and dysphoric mood.       Objective:     Vital Signs  Temp:  [96.5 °F (35.8 °C)-98.8 °F (37.1 °C)] 96.5 °F (35.8 °C)  Heart Rate:  [56-96] 64  Resp:  [18-24] 18  BP: (101-200)/(56-82) 101/57  Flow (L/min):  [2-10] 2   Body mass index is 43.47 kg/m².    Physical Exam  Physical Exam  Constitutional:       General: She is not in acute distress.     Appearance: She is well-developed. She is obese. She is not ill-appearing, toxic-appearing or diaphoretic.   HENT:      Head: Normocephalic and atraumatic.      Right Ear: External ear normal.      Left Ear: External ear normal.      Nose: Nose normal.      Mouth/Throat:      Mouth: Mucous membranes are moist.   Eyes:      Conjunctiva/sclera: Conjunctivae normal.       Pupils: Pupils are equal, round, and reactive to light.   Neck:      Thyroid: No thyromegaly.      Trachea: No tracheal deviation.   Cardiovascular:      Rate and Rhythm: Normal rate and regular rhythm.      Heart sounds: Normal heart sounds. No murmur. No friction rub. No gallop.    Pulmonary:      Effort: Pulmonary effort is normal. No respiratory distress.      Breath sounds: Normal breath sounds. No wheezing.   Abdominal:      General: Bowel sounds are normal. There is no distension.      Palpations: Abdomen is soft.      Tenderness: There is no abdominal tenderness. There is no guarding or rebound.   Musculoskeletal:         General: Normal range of motion.      Cervical back: Normal range of motion and neck supple.      Comments: LLE wrapped: clean,dry, intact   Skin:     General: Skin is warm and dry.      Capillary Refill: Capillary refill takes less than 2 seconds.   Neurological:      General: No focal deficit present.      Mental Status: She is alert and oriented to person, place, and time. Mental status is at baseline.      Sensory: No sensory deficit.      Motor: No abnormal muscle tone.   Psychiatric:         Mood and Affect: Mood normal.         Behavior: Behavior normal.         Scheduled Meds   ampicillin-sulbactam, 3 g, Intravenous, Q6H  ARIPiprazole, 15 mg, Oral, Daily  aspirin, 81 mg, Oral, Daily  atorvastatin, 80 mg, Oral, Daily  clopidogrel, 75 mg, Oral, Daily  enoxaparin, 40 mg, Subcutaneous, Q12H  furosemide, 40 mg, Oral, Daily  gabapentin, 400 mg, Oral, TID  insulin aspart, 0-7 Units, Subcutaneous, TID AC  insulin detemir, 60 Units, Subcutaneous, Nightly  metoprolol succinate XL, 25 mg, Oral, Daily  mirtazapine, 45 mg, Oral, Nightly  pantoprazole, 20 mg, Oral, Daily  sodium chloride, 10 mL, Intravenous, Q12H  sodium chloride 0.9% - IBW for BMI > 30, 30 mL/kg (Ideal), Intravenous, Once  venlafaxine XR, 150 mg, Oral, Daily With Breakfast       PRN Meds   •  acetaminophen  •  bisacodyl  •   dextrose  •  dextrose  •  dextrose  •  glucagon (human recombinant)  •  guaiFENesin-dextromethorphan  •  HYDROcodone-acetaminophen  •  HYDROmorphone **AND** naloxone  •  ibuprofen  •  LORazepam  •  meclizine  •  melatonin  •  metoclopramide  •  ondansetron  •  ondansetron  •  [COMPLETED] Insert peripheral IV **AND** sodium chloride  •  sodium chloride      Diagnostic Data    Results from last 7 days   Lab Units 03/13/21  0721   WBC 10*3/mm3 11.47*   HEMOGLOBIN g/dL 10.0*   HEMATOCRIT % 30.2*   PLATELETS 10*3/mm3 374   GLUCOSE mg/dL 180*   CREATININE mg/dL 1.04*   BUN mg/dL 10   SODIUM mmol/L 140   POTASSIUM mmol/L 3.7   AST (SGOT) U/L 13   ALT (SGPT) U/L 11   ALK PHOS U/L 107   BILIRUBIN mg/dL 0.3   ANION GAP mmol/L 9.0       No radiology results for the last day      I reviewed the patient's new clinical results.    Assessment/Plan:     Active Hospital Problems    Diagnosis  POA   • **Staphylococcus aureus bacteremia with sepsis (CMS/[facility]) [A41.01]  Yes     Sepsis secondary to left lower extremity cellulitis. Given 1 L bolus in the ED. Procal elevated. S. Aureus growing on blood cultures.  Gram-negative also growing on blood cultures.  No vegetation seen on echo.  Vancomycin and Zosyn discontinued  -Continue ampicillin sulbactam         • Urinary retention [R33.9]  Yes     -Jose in place due to requiring multiple straight caths  - check UA with culture if indicated  - remove as tolerated per removal protocol     • Cellulitis of left lower extremity [L03.116]  Yes     CTA chest shows no evidence of pulmonary embolism per radiologist interpretation. Duplex venous ultrasound showed no evidence of DVT per radiology interpretation. CT LLE concerning for septic joint and worsening osseous changes.  -Podiatry following, Dr. 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 PGY3  [facility]  [address] [place] [address]  Office: [phone]    This document has been electronically signed by iNck Faye MD on March 13, 2021 08:44 CST          Electronically signed by [facility] HospitalNoemi adams MD at 03/13/21 1527       Consult Notes (last 24 hours) (Notes from 03/12/21 1733 through 03/13/21 1733)    No notes of this type exist for this encounter.

## 2021-03-13 NOTE — DISCHARGE PLACEMENT REQUEST
"Ariana Bailey (61 y.o. Female)     Date of Birth Social Security Number Address Home Phone MRN    1960  [id number]  [facility] [address] [phone] [id number]    Confucianist Marital Status          Mandaen        Admission Date Admission Type Admitting Provider Attending Provider Department, Room/Bed    3/9/21 Emergency Tirso Herrera MD Weber, Alexander B, MD [facility] 3 EAST, 372/1    Discharge Date Discharge Disposition Discharge Destination         Transfer to Another Facility              Attending Provider: Seb Troncoso MD    Allergies: Seroquel [Quetiapine], Avandia [Rosiglitazone], Morphine And Related, Oxycodone    Isolation: None   Infection: None   Code Status: CPR    Ht: 172.7 cm (67.99\")   Wt: 130 kg (285 lb 12.8 oz)    Admission Cmt: None   Principal Problem: Staphylococcus aureus bacteremia with sepsis (CMS/[facility]) [A41.01] More...                 Active Insurance as of 3/9/2021     Primary Coverage     Payor Plan Insurance Group Employer/Plan Group    [employer] EMPLOY[employer] [id number]     Payor Plan Address Payor Plan Phone Number Payor Plan Fax Number Effective Dates    [id number] [phone]  1/1/2015 - None Entered    [place] [address]       Subscriber Name Subscriber Birth Date Member ID       ARIANA BAILEY 1960 [id number]                 Emergency Contacts      (Rel.) Home Phone Work Phone Mobile Phone    Nadeem Bailey (Spouse) [phone] -- [phone]    Елена David (Sister) [phone] -- [phone]              "

## 2021-03-13 NOTE — PLAN OF CARE
Goal Outcome Evaluation:     Progress: improving  Outcome Summary: pt just c/o burning in left foot now; seems to have slept well throughout night; vss; will continue to monitor.

## 2021-03-13 NOTE — PROGRESS NOTES
FAMILY MEDICINE DAILY PROGRESS NOTE    NAME: Ariana Martinez  : 1960  MRN: [id number]      LOS: 4 days     PROVIDER OF SERVICE: Nick Faye MD    Chief Complaint: Staphylococcus aureus bacteremia with sepsis (CMS/[facility])    Subjective:     Interval History:  History taken from: patient chart    C/o left ankle pain. Hardware removed yesterday with Dr. Lord. Podiatry following. Ampicillin for staph bacteremia.     Review of Systems:   Review of Systems   Constitutional: Negative for appetite change, diaphoresis, fatigue and fever.   HENT: Negative for ear pain, postnasal drip, rhinorrhea and sore throat.    Eyes: Negative for pain and visual disturbance.   Respiratory: Negative for cough, chest tightness and shortness of breath.    Cardiovascular: Negative for chest pain, palpitations and leg swelling.   Gastrointestinal: Negative for abdominal pain, constipation, diarrhea, nausea and vomiting.   Genitourinary: Negative for dysuria, flank pain, frequency and urgency.   Musculoskeletal: Positive for arthralgias. Negative for back pain and myalgias.   Skin: Negative for pallor and rash.   Neurological: Negative for dizziness, seizures, syncope, weakness and numbness.   Psychiatric/Behavioral: Negative for agitation, confusion, decreased concentration and dysphoric mood.       Objective:     Vital Signs  Temp:  [96.5 °F (35.8 °C)-98.8 °F (37.1 °C)] 96.5 °F (35.8 °C)  Heart Rate:  [56-96] 64  Resp:  [18-24] 18  BP: (101-200)/(56-82) 101/57  Flow (L/min):  [2-10] 2   Body mass index is 43.47 kg/m².    Physical Exam  Physical Exam  Constitutional:       General: She is not in acute distress.     Appearance: She is well-developed. She is obese. She is not ill-appearing, toxic-appearing or diaphoretic.   HENT:      Head: Normocephalic and atraumatic.      Right Ear: External ear normal.      Left Ear: External ear normal.      Nose: Nose normal.      Mouth/Throat:      Mouth: Mucous membranes are moist.     Eyes:      Conjunctiva/sclera: Conjunctivae normal.      Pupils: Pupils are equal, round, and reactive to light.   Neck:      Thyroid: No thyromegaly.      Trachea: No tracheal deviation.   Cardiovascular:      Rate and Rhythm: Normal rate and regular rhythm.      Heart sounds: Normal heart sounds. No murmur. No friction rub. No gallop.    Pulmonary:      Effort: Pulmonary effort is normal. No respiratory distress.      Breath sounds: Normal breath sounds. No wheezing.   Abdominal:      General: Bowel sounds are normal. There is no distension.      Palpations: Abdomen is soft.      Tenderness: There is no abdominal tenderness. There is no guarding or rebound.   Musculoskeletal:         General: Normal range of motion.      Cervical back: Normal range of motion and neck supple.      Comments: LLE wrapped: clean,dry, intact   Skin:     General: Skin is warm and dry.      Capillary Refill: Capillary refill takes less than 2 seconds.   Neurological:      General: No focal deficit present.      Mental Status: She is alert and oriented to person, place, and time. Mental status is at baseline.      Sensory: No sensory deficit.      Motor: No abnormal muscle tone.   Psychiatric:         Mood and Affect: Mood normal.         Behavior: Behavior normal.         Scheduled Meds   ampicillin-sulbactam, 3 g, Intravenous, Q6H  ARIPiprazole, 15 mg, Oral, Daily  aspirin, 81 mg, Oral, Daily  atorvastatin, 80 mg, Oral, Daily  clopidogrel, 75 mg, Oral, Daily  enoxaparin, 40 mg, Subcutaneous, Q12H  furosemide, 40 mg, Oral, Daily  gabapentin, 400 mg, Oral, TID  insulin aspart, 0-7 Units, Subcutaneous, TID AC  insulin detemir, 60 Units, Subcutaneous, Nightly  metoprolol succinate XL, 25 mg, Oral, Daily  mirtazapine, 45 mg, Oral, Nightly  pantoprazole, 20 mg, Oral, Daily  sodium chloride, 10 mL, Intravenous, Q12H  sodium chloride 0.9% - IBW for BMI > 30, 30 mL/kg (Ideal), Intravenous, Once  venlafaxine XR, 150 mg, Oral, Daily With  Breakfast       PRN Meds   •  acetaminophen  •  bisacodyl  •  dextrose  •  dextrose  •  dextrose  •  glucagon (human recombinant)  •  guaiFENesin-dextromethorphan  •  HYDROcodone-acetaminophen  •  HYDROmorphone **AND** naloxone  •  ibuprofen  •  LORazepam  •  meclizine  •  melatonin  •  metoclopramide  •  ondansetron  •  ondansetron  •  [COMPLETED] Insert peripheral IV **AND** sodium chloride  •  sodium chloride      Diagnostic Data    Results from last 7 days   Lab Units 03/13/21  0721   WBC 10*3/mm3 11.47*   HEMOGLOBIN g/dL 10.0*   HEMATOCRIT % 30.2*   PLATELETS 10*3/mm3 374   GLUCOSE mg/dL 180*   CREATININE mg/dL 1.04*   BUN mg/dL 10   SODIUM mmol/L 140   POTASSIUM mmol/L 3.7   AST (SGOT) U/L 13   ALT (SGPT) U/L 11   ALK PHOS U/L 107   BILIRUBIN mg/dL 0.3   ANION GAP mmol/L 9.0       No radiology results for the last day      I reviewed the patient's new clinical results.    Assessment/Plan:     Active Hospital Problems    Diagnosis  POA   • **Staphylococcus aureus bacteremia with sepsis (CMS/[facility]) [A41.01]  Yes     Sepsis secondary to left lower extremity cellulitis. Given 1 L bolus in the ED. Procal elevated. S. Aureus growing on blood cultures.  Gram-negative also growing on blood cultures.  No vegetation seen on echo.  Vancomycin and Zosyn discontinued  -Continue ampicillin sulbactam         • Urinary retention [R33.9]  Yes     -Jose in place due to requiring multiple straight caths  - check UA with culture if indicated  - remove as tolerated per removal protocol     • Cellulitis of left lower extremity [L03.116]  Yes     CTA chest shows no evidence of pulmonary embolism per radiologist interpretation. Duplex venous ultrasound showed no evidence of DVT per radiology interpretation. CT LLE concerning for septic joint and worsening osseous changes.  -Podiatry following, Dr. Lord; appreciate recommendations/management: hardware removed  -Continue ampicillin sulbactam       • CAD (coronary artery disease)  [I25.10]  Yes     -Continue metoprolol and Plavix     • Hypertension [I10]  Yes     -Continue metoprolol, hydrochlorothiazide, Lasix     • (HFpEF) heart failure with preserved ejection fraction (CMS/HCC) [I50.30]  Yes     -Continue Lasix, metoprolol, atorvastatin       • Depression, major, recurrent, moderate (CMS/HCC) [F33.1]  Yes     Continue home Effexor, Remeron, Ativan, Abilify     • 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, with long-term current use of insulin (CMS/[facility]) [E11.49, E11.65, Z79.4]  Not Applicable     -Continue 60 units Levemir nightly  -Sliding scale insulin for additional coverage         DVT prophylaxis: Lovenox  Code status is   Code Status and Medical Interventions:   Ordered at: 03/09/21 2234     Code Status:    CPR     Medical Interventions (Level of Support Prior to Arrest):    Full       Plan for disposition:Where: current living arrangements and vs SNF and When:  1-2 days      Time: 20 mins       Nick Faye M.D. PGY3  [facility]  [address] [place] [address]  Office: [phone]    This document has been electronically signed by Nick Faye MD on March 13, 2021 08:44 CST

## 2021-03-13 NOTE — DISCHARGE SUMMARY
DISCHARGE SUMMARY    PATIENT NAME: Ariana Martinez       PHYSICIAN: Nick Faye MD  : 1960  MRN: [id number]    ADMITTED: 3/9/2021     DISCHARGED: 3/13/2021    ADMISSION DIAGNOSES:  Active Hospital Problems    Diagnosis  POA   • Acute bacterial endocarditis [I33.0]  Yes   • Staphylococcus aureus bacteremia [R78.81, B95.61]  Yes   • Infection due to Acinetobacter species [A49.8]  Yes   • Urinary retention [R33.9]  Yes   • Cellulitis of left lower extremity [L03.116]  Yes   • CAD (coronary artery disease) [I25.10]  Yes   • Hypertension [I10]  Yes   • (HFpEF) heart failure with preserved ejection fraction (CMS/HCC) [I50.30]  Yes   • Depression, major, recurrent, moderate (CMS/HCC) [F33.1]  Yes   • 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, with long-term current use of insulin (CMS/HCC) [E11.49, E11.65, Z79.4]  Not Applicable      Resolved Hospital Problems    Diagnosis Date Resolved POA   • **Staphylococcus aureus bacteremia with sepsis (CMS/HCC) [A41.01] 2021 Yes   • CHON (acute kidney injury) (CMS/HCC) [N17.9] 2021 Yes   • Diabetic peripheral neuropathy associated with type 2 diabetes mellitus (CMS/HCC) [E11.42] 03/10/2021 Yes     DISCHARGE DIAGNOSES:   Active Hospital Problems    Diagnosis  POA   • Acute bacterial endocarditis [I33.0]  Yes   • Staphylococcus aureus bacteremia [R78.81, B95.61]  Yes   • Infection due to Acinetobacter species [A49.8]  Yes   • Urinary retention [R33.9]  Yes   • Cellulitis of left lower extremity [L03.116]  Yes   • CAD (coronary artery disease) [I25.10]  Yes   • Hypertension [I10]  Yes   • (HFpEF) heart failure with preserved ejection fraction (CMS/HCC) [I50.30]  Yes   • Depression, major, recurrent, moderate (CMS/HCC) [F33.1]  Yes   • 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, with long-term current use of insulin (CMS/HCC) [E11.49, E11.65, Z79.4]  Not Applicable      Resolved Hospital Problems     "Diagnosis Date Resolved POA   • **Staphylococcus aureus bacteremia with sepsis (CMS/[facility]) [A41.01] 03/13/2021 Yes   • CHON (acute kidney injury) (CMS/[facility]) [N17.9] 03/13/2021 Yes   • Diabetic peripheral neuropathy associated with type 2 diabetes mellitus (CMS/[facility]) [E11.42] 03/10/2021 Yes       SERVICE: Family Medicine Residency  Attending: Noemi Jones MD  Resident: Nick Faye MD    CONSULTS:   Consult Orders (all) (From admission, onward)     Start     Ordered    03/09/21 2049  Foot / Ankle (on-call MD unless specified)  Once     Specialty:  Podiatry  Provider:  Ignacio Lord DPM    03/09/21 2048                PROCEDURES:   CT lower extremity left without contrast-3/10/2021  Midline without port-3/10/2021  CT angiogram chest-3/9/2021  Ultrasound venous Doppler lower extremity left-3/9/2021  X-ray ankle 3+ view left-3/9/2021  X-ray foot 3+ view left-3/9/2021  ECG 12-lead-3/9/2021  Although transthoracic echo complete with contrast-3/11/2021    HISTORY OF PRESENT ILLNESS: Per Dr. Whitt's H&P 3/9/2021:    Ariana Martinez is a 61 y.o. female with a CMH of coronary artery disease, depression, hypertension, type 2 diabetes mellitus and a recent left ankle arthroscopy, exostectomy, hardware removal of the left foot on 2/26/2021 with Dr. Lord (Podiatry) who presents to the ED with complaints of left lower extremity pain and swelling. Patient states that last night she was hobbling on one leg from her bedside commode to her bed when she heard a \"pop\" on her left foot. Patient had a clinic appointment with Dr. Lord today for follow-up of recent surgery where he completed some blood work. Given that patient developed worsening of lower extremity pain and swelling, Dr. Lodr recommended that patient go to the ED to rule out DVT.      In the ED, patient was given Norco for pain, and started on Vanco and Zosyn.  She was also given 1 L bolus.  CTA chest did not show any evidence of PE.  Duplex venous " ultrasound did not show any evidence of DVT.  Patient had a lactate of 2.1 and WBC of 22.      DIAGNOSTIC DATA:   Lab Results (last 24 hours)     Procedure Component Value Units Date/Time    POC Glucose Once [[id number]]  (Normal) Collected: 03/13/21 1722    Specimen: Blood Updated: 03/13/21 1736     Glucose 86 mg/dL      Comment: : [id number] LYLE GILMANMeter ID: [id number]       Blood Culture - Blood, Wrist, Left [[id number]] Collected: 03/11/21 1455    Specimen: Blood from Wrist, Left Updated: 03/13/21 1515     Blood Culture No growth at 2 days    Blood Culture - Blood, Hand, Right [[id number]] Collected: 03/11/21 1446    Specimen: Blood from Hand, Right Updated: 03/13/21 1500     Blood Culture No growth at 2 days    MRSA Screen, PCR (Inpatient) - Swab, Nares [[id number]]  (Normal) Collected: 03/13/21 1230    Specimen: Swab from Nares Updated: 03/13/21 1415     MRSA, PCR Negative    Narrative:      Performed by real-time polymerase chain reaction (qPCR).    Wound Culture - Wound, Ankle, Left [[id number]]  (Abnormal) Collected: 03/12/21 1129    Specimen: Wound from Ankle, Left Updated: 03/13/21 1344     Wound Culture Scant growth (1+) Staphylococcus aureus     Gram Stain Moderate (3+) WBCs seen      Few (2+) Gram positive cocci in pairs    Urinalysis, Microscopic Only - Urine, Catheter [[id number]]  (Abnormal) Collected: 03/13/21 1230    Specimen: Urine, Catheter Updated: 03/13/21 1254     RBC, UA 6-12 /HPF      WBC, UA 3-5 /HPF      Bacteria, UA None Seen /HPF      Squamous Epithelial Cells, UA None Seen /HPF      Hyaline Casts, UA 0-2 /LPF      Methodology Automated Microscopy    Urinalysis With Culture If Indicated - Urine, Catheter [[id number]]  (Abnormal) Collected: 03/13/21 1230    Specimen: Urine, Catheter Updated: 03/13/21 1252     Color, UA Yellow     Appearance, UA Clear     pH, UA 5.5     Specific Gravity, UA 1.013     Glucose, UA Negative     Ketones, UA Negative     Bilirubin, UA Negative      Blood, UA Trace     Protein, UA Negative     Leuk Esterase, UA Negative     Nitrite, UA Negative     Urobilinogen, UA 1.0 E.U./dL    POC Glucose Once [[id number]]  (Abnormal) Collected: 03/13/21 1050    Specimen: Blood Updated: 03/13/21 1129     Glucose 151 mg/dL      Comment: RN NotifiedOperator: [id number] LYLE Hanley ID: [id number]       Comprehensive Metabolic Panel [[id number]]  (Abnormal) Collected: 03/13/21 0721    Specimen: Blood Updated: 03/13/21 0802     Glucose 180 mg/dL      BUN 10 mg/dL      Creatinine 1.04 mg/dL      Sodium 140 mmol/L      Potassium 3.7 mmol/L      Chloride 106 mmol/L      CO2 25.0 mmol/L      Calcium 8.5 mg/dL      Total Protein 5.8 g/dL      Albumin 2.60 g/dL      ALT (SGPT) 11 U/L      AST (SGOT) 13 U/L      Alkaline Phosphatase 107 U/L      Total Bilirubin 0.3 mg/dL      eGFR Non African Amer 54 mL/min/1.73      Globulin 3.2 gm/dL      A/G Ratio 0.8 g/dL      BUN/Creatinine Ratio 9.6     Anion Gap 9.0 mmol/L     Narrative:      GFR Normal >60  Chronic Kidney Disease <60  Kidney Failure <15      C-reactive Protein [[id number]]  (Abnormal) Collected: 03/13/21 0721    Specimen: Blood Updated: 03/13/21 0802     C-Reactive Protein 19.16 mg/dL     CBC & Differential [[id number]]  (Abnormal) Collected: 03/13/21 0721    Specimen: Blood Updated: 03/13/21 0743    Narrative:      The following orders were created for panel order CBC & Differential.  Procedure                               Abnormality         Status                     ---------                               -----------         ------                     CBC Auto Differential[[id number]]        Abnormal            Final result                 Please view results for these tests on the individual orders.    CBC Auto Differential [[id number]]  (Abnormal) Collected: 03/13/21 0721    Specimen: Blood Updated: 03/13/21 0743     WBC 11.47 10*3/mm3      RBC 3.33 10*6/mm3      Hemoglobin 10.0 g/dL      Hematocrit 30.2 %      MCV  90.7 fL      MCH 30.0 pg      MCHC 33.1 g/dL      RDW 13.6 %      RDW-SD 45.4 fl      MPV 9.6 fL      Platelets 374 10*3/mm3      Neutrophil % 77.3 %      Lymphocyte % 11.6 %      Monocyte % 8.9 %      Eosinophil % 1.1 %      Basophil % 0.6 %      Immature Grans % 0.5 %      Neutrophils, Absolute 8.86 10*3/mm3      Lymphocytes, Absolute 1.33 10*3/mm3      Monocytes, Absolute 1.02 10*3/mm3      Eosinophils, Absolute 0.13 10*3/mm3      Basophils, Absolute 0.07 10*3/mm3      Immature Grans, Absolute 0.06 10*3/mm3      nRBC 0.0 /100 WBC     POC Glucose Once [[id number]]  (Abnormal) Collected: 03/13/21 0636    Specimen: Blood Updated: 03/13/21 0653     Glucose 184 mg/dL      Comment: RN NotifiedOperator: [id number] GILDA Romero ID: [id number]       POC Glucose Once [[id number]]  (Abnormal) Collected: 03/13/21 0044    Specimen: Blood Updated: 03/13/21 0058     Glucose 309 mg/dL      Comment: : [id number] SUMMERS ANDREAMeter ID: [id number]           Microbiology Results (last 10 days)     Procedure Component Value - Date/Time    MRSA Screen, PCR (Inpatient) - Swab, Nares [[id number]]  (Normal) Collected: 03/13/21 1230    Lab Status: Final result Specimen: Swab from Nares Updated: 03/13/21 1415     MRSA, PCR Negative    Narrative:      Performed by real-time polymerase chain reaction (qPCR).    Wound Culture - Wound, Ankle, Left [[id number]]  (Abnormal) Collected: 03/12/21 1129    Lab Status: Preliminary result Specimen: Wound from Ankle, Left Updated: 03/13/21 1344     Wound Culture Scant growth (1+) Staphylococcus aureus     Gram Stain Moderate (3+) WBCs seen      Few (2+) Gram positive cocci in pairs    Blood Culture - Blood, Wrist, Left [[id number]] Collected: 03/11/21 1455    Lab Status: Preliminary result Specimen: Blood from Wrist, Left Updated: 03/13/21 1515     Blood Culture No growth at 2 days    Blood Culture - Blood, Hand, Right [[id number]] Collected: 03/11/21 1446    Lab Status: Preliminary  result Specimen: Blood from Hand, Right Updated: 03/13/21 1500     Blood Culture No growth at 2 days    Blood Culture - Blood, Blood, Venous Line [[id number]]  (Abnormal) Collected: 03/09/21 1931    Lab Status: Final result Specimen: Blood, Venous Line Updated: 03/12/21 1413     Blood Culture Staphylococcus aureus     Comment: Infectious disease consultation is highly recommended to rule out distant foci of infection.        Isolated from Aerobic and Anaerobic Bottles     Gram Stain Anaerobic Bottle Gram positive cocci in clusters     Comment: 3 bottles positive of 4 drawn. Plated to BAP, HEIDI BAP,CHET,MAC. 3/10/21 at 2040.         Aerobic Bottle Gram positive cocci in clusters     Comment: 4 out of 4 bottle drawn positive for gram positive cocci in clusters       Narrative:      Refer to previous blood culture collected on 3/9/2021 2031 for MICs.      Blood Culture - Blood, Blood, Venous Line [[id number]]  (Abnormal)  (Susceptibility) Collected: 03/09/21 1931    Lab Status: Final result Specimen: Blood, Venous Line Updated: 03/12/21 0938     Blood Culture Staphylococcus aureus     Comment: Infectious disease consultation is highly recommended to rule out distant foci of infection.        Isolated from Aerobic and Anaerobic Bottles     Gram Stain Anaerobic Bottle Gram positive cocci in clusters     Comment: 1 positive of 4 drawn for gram positive cocci          Aerobic Bottle Gram positive cocci in clusters     Comment: 2 positive of 4 drawn for gram positive cocci       Susceptibility      Staphylococcus aureus      SHITAL      Gentamicin Susceptible      Oxacillin Susceptible      Rifampin Susceptible      Vancomycin Susceptible               Linear View               Susceptibility Comments     Staphylococcus aureus    This isolate does not demonstrate inducible clindamycin resistance in vitro.    This isolate does not demonstrate inducible clindamycin resistance in vitro.               Blood Culture ID, PCR - Blood,  Blood, Venous Line [[id number]]  (Abnormal) Collected: 03/09/21 1931    Lab Status: Final result Specimen: Blood, Venous Line Updated: 03/10/21 1353     BCID, PCR Staphylococcus aureus. Identification by BCID PCR.    COVID-19 and FLU A/B PCR - Swab, Nasopharynx [[id number]]  (Normal) Collected: 03/09/21 1922    Lab Status: Final result Specimen: Swab from Nasopharynx Updated: 03/09/21 2003     COVID19 Not Detected     Influenza A PCR Not Detected     Influenza B PCR Not Detected    Narrative:      Fact sheet for providers: https://www.fda.gov/media/724518/download    Fact sheet for patients: https://www.fda.gov/media/952808/download    Test performed by PCR.    Wound Culture - Wound, Foot [[id number]]  (Abnormal)  (Susceptibility) Collected: 03/09/21 1650    Lab Status: Final result Specimen: Wound from Foot Updated: 03/11/21 0728     Wound Culture Light growth (2+) Staphylococcus aureus      Scant growth (1+) Acinetobacter baumannii     Gram Stain Rare (1+) WBCs seen      Rare (1+) Gram positive cocci in pairs    Susceptibility      Staphylococcus aureus Acinetobacter baumannii      SHITAL SHITAL      Ampicillin + Sulbactam  Susceptible      Cefepime  Susceptible      Ceftazidime  Susceptible      Ciprofloxacin  Susceptible      Clindamycin Susceptible       Erythromycin Susceptible       Gentamicin  Susceptible      Inducible Clindamycin Resistance Negative       Levofloxacin  Susceptible      Meropenem  Susceptible      Oxacillin Susceptible       Penicillin G Resistant       Piperacillin + Tazobactam  Susceptible      Rifampin Susceptible       Tetracycline Susceptible Susceptible      Trimethoprim + Sulfamethoxazole Susceptible       Vancomycin Susceptible                  Linear View               Susceptibility Comments     Staphylococcus aureus    This isolate does not demonstrate inducible clindamycin resistance in vitro.                   US Guided Vascular Access    Result Date: 3/10/2021  Midline placement right  cephalic vein. Vascular access device placed under ultrasound guidance as described above Electronically signed by:  Vamshi Rose MD  3/10/2021 5:03 PM CST Workstation: [id number]    US Venous Doppler Lower Extremity Left (duplex)    Result Date: 3/9/2021  No evidence of deep venous thrombosis in the common femoral, femoral, or popliteal veins of the left     lower extremity. Electronically signed by:  Naveed Taylor MD  3/9/2021 5:49 PM CST Workstation: [id number]    CT Angiogram Chest    Result Date: 3/9/2021  1.  No evidence of pulmonary embolism, though there is limited evaluation of segmental branches due to contrast phase.        2.  No evidence of acute pathology associated with the visualized aorta.    3. Linear to bandlike opacities in the upper lung zones probably represent atelectasis and/or scarring 4. Liver appears prominent, but is incompletely included in the study. 5. Pneumobilia, probably secondary to cholecystectomy and sphincterotomy. Electronically signed by:  Janki Molina MD  3/9/2021 8:54 PM CST Workstation: [id number]    CT Lower Extremity Left Without Contrast    Result Date: 3/10/2021  1. Slightly increased fragmentation of the talus and distal tibia previous study, including minimal destructive changes of the anterior aspect of the distal tibia. 2. Increasing joint fluid compared to the previous study, now with multiple tiny locules of gas, concerning for infection of joint fluid. 3. Slightly increasing fluid collection posterior to the tibiotalar joint, surrounding the Achilles tendon. 4. The posterior most calcaneal screw traversing the subtalar joint appears to have broken or crack since the previous study, though it is nondisplaced. [facility] requested to telephone these results to licensed caregiver immediately at 6:18 PM EST on 3/16/2021. Electronically signed by:  Janki Molina MD  3/10/2021 5:19 PM CST Workstation: [id number]    Results for orders placed during the hospital encounter of  03/09/21    Adult Transthoracic Echo Complete W/ Cont if Necessary Per Protocol    Interpretation Summary  · Left ventricular ejection fraction appears to be 51 - 55%.  · Left ventricular diastolic function was normal.  · Estimated right ventricular systolic pressure from tricuspid regurgitation is normal (<35 mmHg).  · A small mitral valve mass is present on the anterior leaflet and is consistent with a vegetation.CLINICAL CORRELATION NEEDED.  Echocardiogram Findings    Left Ventricle Left ventricular ejection fraction appears to be 51 - 55%.   Normal left ventricular cavity size and wall thickness noted. All left ventricular wall segments contract normally. Left ventricular diastolic function was normal.   Right Ventricle Normal right ventricular cavity size, wall thickness, systolic function and septal motion noted.   Left Atrium Normal left atrial size and volume noted.   Right Atrium Normal right atrial cavity size noted.   Aortic Valve The aortic valve is abnormal in structure. The aortic valve exhibits sclerosis. There is mild calcification of the aortic valve. No aortic valve regurgitation is present. No hemodynamically significant aortic valve stenosis is present.   Mitral Valve There is mild calcification of the mitral valve.  A small mitral valve mass is present on the anterior leaflet and is consistent with a vegetation. Trace to mild mitral valve regurgitation is present. No significant mitral valve stenosis is present.   Tricuspid Valve The tricuspid valve is structurally normal with no significant stenosis present. Trace to mild tricuspid valve regurgitation is present. Estimated right ventricular systolic pressure from tricuspid regurgitation is normal (<35 mmHg).   Pulmonic Valve The pulmonic valve is structurally normal with no regurgitation or significant stenosis present.   Greater Vessels No dilation of the aortic root is present.   Pericardium The pericardium is normal. There is no evidence of  pericardial effusion. .        HOSPITAL COURSE:  Patient was admitted for further evaluation and treatment of her septic state with likely source being cellulitis of the left lower extremity.  She has had an ongoing course of treatment of left ankle issues including nonunion of subtalar arthrodesis, pes cavus, acquired subtalar varus with podiatry.  She recently had hardware removed.  CT of ankle was ordered and reviewed by podiatrist who irrigated and debrided the left ankle and removed residual talocalcaneal screw.  A posterior heel culture from outpatient office was growing MSSA and Acinetobacter and antibiotics were changed from Vanco and Zosyn to Unasyn.  Blood cultures obtained returned positive for MSSA.  An echocardiogram was obtained that showed a small vegetation growing on the mitral valve.  Antibiotics were changed to cefazolin 2 g every 8 hours to cover for infective endocarditis and 5 days of Levaquin 750 mg daily were ordered to cover for Acinetobacter.  Case was discussed with cardiologist who reviewed echocardiogram and agreed with finding of mitral valve vegetation without any evidence of surrounding abscess but recommended infectious disease consult.  Spoke with [facility] and discussed case with Dr. Andino who agreed to accept patient to a stepdown telemetry inpatient bed.  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  Discussed situation with patient who is agreeable with transfer and all questions answered.    DISCHARGE CONDITION:   Stable    DISPOSITION:  Transfer to Another Facility    DISCHARGE MEDICATIONS     Discharge Medications      New Medications      Instructions Start Date   ceFAZolin in 0.9% normal saline 2 GM/ 100 mL solution IVPB  Commonly known as: ANCEF   2 g, Intravenous, Every 8 Hours   Start Date: March 14, 2021     HYDROcodone-acetaminophen   MG per tablet  Commonly known as: NORCO   1 tablet, Oral, Every 6 Hours PRN      HYDROmorphone 1 MG/ML injection  Commonly known as: DILAUDID   0.5 mg, Intravenous, Every 2 Hours PRN      levoFLOXacin (LEVAQUIN) 750 mg/150 mL D5W (premix) 750 MG/150ML solution IVPB  Commonly known as: LEVAQUIN   750 mg, Intravenous, Once      lisinopril 10 MG tablet  Commonly known as: PRINIVIL,ZESTRIL   10 mg, Oral, Daily      naloxone 0.4 MG/ML injection  Commonly known as: NARCAN   0.2 mg, Intravenous, Every 5 Minutes PRN         Continue These Medications      Instructions Start Date   Accu-Chek Iris Plus test strip  Generic drug: glucose blood   USE TO CHECK BLOOD SUGAR 4 TIMES DAILY. ACCUCHECK, E11.9      ARIPiprazole 15 MG tablet  Commonly known as: ABILIFY   15 mg, Oral, Daily      aspirin 81 MG EC tablet   81 mg, Oral, Daily      atorvastatin 80 MG tablet  Commonly known as: LIPITOR   TAKE 1 TABLET BY MOUTH EVERY DAY      B-D ULTRAFINE III SHORT PEN 31G X 8 MM misc  Generic drug: Insulin Pen Needle   USE TO INJECT 4 TIMES A DAY      BD [employer] misc   1 each, Does not apply, Take As Directed      Citracal/Vitamin D 250-200 MG-UNIT tablet  Generic drug: Calcium Citrate-Vitamin D   2 tablets, Oral, 2 Times Daily      clopidogrel 75 MG tablet  Commonly known as: PLAVIX   75 mg, Oral, Daily      cyanocobalamin 1000 MCG/ML injection   1,000 mcg, Intramuscular, Every 28 Days      folic acid 1 MG tablet  Commonly known as: FOLVITE   1 mg, Oral, Daily      furosemide 40 MG tablet  Commonly known as: LASIX   TAKE 1 TABLET BY MOUTH EVERY DAY      gabapentin 400 MG capsule  Commonly known as: NEURONTIN   400 mg, Oral, 3 Times Daily      Glucagon Emergency 1 MG kit   USE AS DIRECTED AS NEEDED      glucose monitor monitoring kit   1 each, Does not apply, Daily, accucheck iris meter, E11.9      hydroCHLOROthiazide 12.5 MG tablet  Commonly known as: HYDRODIURIL   12.5 mg, Oral, Daily      Insulin Glargine 100 UNIT/ML  "injection pen  Commonly known as: BASAGLAR KWIKPEN   60 Units, Subcutaneous, Nightly      LORazepam 0.5 MG tablet  Commonly known as: ATIVAN   TAKE 1 TABLET BY MOUTH EVERY DAY AS NEEDED FOR ANXIETY      meclizine 25 MG tablet  Commonly known as: ANTIVERT   25 mg, Oral, 3 Times Daily PRN      metoclopramide 10 MG tablet  Commonly known as: REGLAN   TAKE 1 TABLET BY MOUTH FOUR TIMES A DAY AS NEEDED      metoprolol succinate XL 25 MG 24 hr tablet  Commonly known as: TOPROL-XL   TAKE 1 TABLET BY MOUTH EVERY DAY      mirtazapine 45 MG tablet  Commonly known as: REMERON   45 mg, Oral, Nightly      NovoLOG FlexPen 100 UNIT/ML solution pen-injector sc pen  Generic drug: insulin aspart   INJECT 60 UNITS BEFORE MEALS 3 TIMES A DAY      ondansetron 8 MG tablet  Commonly known as: ZOFRAN   8 mg, Oral, Every 8 Hours PRN      ONE TOUCH ULTRA MINI w/Device kit   USE AS DIRECTED TO CHECK BLOOD SUGAR      pantoprazole 20 MG EC tablet  Commonly known as: PROTONIX   TAKE 1 TABLET BY MOUTH EVERY DAY      Syringe/Needle (Disp) 25G X 5/8\" 3 ML misc  Commonly known as: Luer Lock Safety Syringes   1 each, Does not apply, Daily, 1 Q28 DAYS      traMADol 50 MG tablet  Commonly known as: ULTRAM   50 mg, Oral, Every 6 Hours PRN      venlafaxine  MG 24 hr capsule  Commonly known as: EFFEXOR-XR   150 mg, Oral, Every Morning      vitamin D 1.25 MG (85737 UT) capsule capsule  Commonly known as: ERGOCALCIFEROL   TAKE ONE CAPSULE BY MOUTH EVERY SUNDAY.         Stop These Medications    doxycycline 100 MG capsule  Commonly known as: VIBRAMYCIN            INSTRUCTIONS:  Activity:   Activity Instructions     Activity as Tolerated          Diet:   Diet Instructions     Diet: Consistent Carbohydrate      Discharge Diet: Consistent Carbohydrate          FOLLOW UP:   Follow-up Information     Ferguson, Kimberly Ventura, APRN .    Specialties: Family Medicine, Nurse Practitioner  Contact information:  [address] [place] [address] DR Nicol FUENTES" [id number] [phone]                   PENDING TEST RESULTS AT DISCHARGE  Pending Labs     Order Current Status    Tissue Pathology Exam In process    Blood Culture - Blood, Hand, Right Preliminary result    Blood Culture - Blood, Wrist, Left Preliminary result    Wound Culture - Wound, Ankle, Left Preliminary result          Time: >30 minutes was spent in discharge planning, medication reconciliation and coordination of care for this patient.    Noemi Jones MD is the attending at time of discharge, She is aware of the patient's status and agrees with the above discharge summary.      Nick Faye M.D. PGY3  [facility]  [address] [place] [address]  Office: [phone]    This document has been electronically signed by Nick Faye MD on March 13, 2021 17:45 CST

## 2021-03-14 LAB
BACTERIA SPEC AEROBE CULT: ABNORMAL
GRAM STN SPEC: ABNORMAL
GRAM STN SPEC: ABNORMAL

## 2021-03-14 NOTE — NURSING NOTE
Report called to Renate rahman [facility] rm [id number]   The following instructions given during Pre Admission Testing visit:    Do not eat or drink anything after MN except for sips of water with your a.m. Prescription meds unless otherwise instructed by your physician.    Glasses and jewelry may be worn, but dentures must be removed prior to cath/procedure.    Leave any items you consider valuable at home.    Family members may wait in [facility] waiting area during procedure.    Bring all medications in their original containers the day of procedure.    Bring photo ID and insurance cards on the day of procedure.    Need to make arrangements for transportation prior to discharge.    The following handouts were given:     Heart Cath pathway (if applicable)   Cardiac Cath booklet published by [facility]    OR appropriate [facility] procedure booklet    If applicable, pt instructed to bring CPAP mask and tubing the day of procedure.

## 2021-03-14 NOTE — OP NOTE
SURGEON: Ignacio Lord DPM    ASSISTANT: Maira Epstein, CST     PREOPERATIVE DIAGNOSES: Left ankle cellulitis, possible septic ankle.     POSTOPERATIVE DIAGNOSES:   1. Left septic ankle.  2. Retained hardware left foot.     PROCEDURES PERFORMED:   1. Arthroscopic irrigation and debridement left ankle.   2. Removal of hardware left foot.   3. Incision and drainage left ankle.     ANESTHESIA: General.     HEMOSTASIS: Left thigh pneumatic tourniquet per nursing record.     ESTIMATED BLOOD LOSS: Less than 10 mL.    MATERIALS:  1/2-inch iodoform packing.     INJECTABLES: None.     COMPLICATIONS: None.     INDICATIONS:  This is a current inpatient at [facility] for postoperative left ankle infection. 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 Two weeks prior she did undergo left ankle arthroscopy, exostectomy and hardware removal with me. Over the past few days, she has noticed increased swelling, redness and pain to the left foot and ankle. Preoperative CT scan did display increased volume of the ankle joint concerning for septic ankle. She presents today for irrigation and debridement and additional removal of subtalar screw. All risks, benefits and potential complications were described in detail. No guarantee was given or implied at any time. She has been n.p.o. since midnight.     DESCRIPTION OF PROCEDURE:  Under mild sedation, the patient was brought in the operating room and placed on the operating table in supine position. Following induction of general anesthesia, the left foot and ankle were prepped and draped in the usual aseptic fashion. Attention was then drawn to the anterior ankle where the previous anteromedial ankle portal was reopened. There was scant purulence as soon as we reopened this portal. The arthroscope was inserted and there was additional  purulence identified within the ankle joint. The anterolateral portal was then opened and shaver was inserted. Debridement of all nonviable tissue and continued irrigation was performed. Attention was redirected to the anteromedial talar dome where the patient had prior fracturing due to her loosened hardware. Small osteochondral fragments were removed from this area and sent for pathological evaluation and culture. Following excision of these small loose bodies, there was exposure of the single remaining subtalar joint screw which was subsequently removed through a plantar lateral incision. Following copious irrigation of the ankle joint with 12L sterile saline, the arthroscope and shaver were removed and the ankle joint was suctioned. At this time there was noted to be additional purulence within the soft tissue over the anterolateral portal. This excision was extended and dissection was carried down to the deep fascia. This purulence seemed to be coming predominantly from the area extending down towards the sinus tarsi. All purulence was evacuated. Next, the medial ankle incision was reopened and dissection was carried down to the medial tendons which were noted to be fairly healthy without significant purulence identified at this level. The medial ankle, anterolateral ankle and posterior heel incisions were then irrigated with 3L sterile saline under gravity lavage. The anteromedial ankle portal was closed with 4-0 nylon. The remaining incisions were packed open with 1/2-inch iodoform packing gauze. The patient was then placed into a dry sterile dressing and posterior splint. We will keep her nonweightbearing, await intraoperative culture and pathology. At this point, any attempt at limb salvage will require at least 6 weeks of IV antibiotics with continued monitoring of her labs and possible tibiotalar calcaneal arthrodesis at a later date pending resolution of infection. She will return to the hospital floor  for continued monitoring.

## 2021-03-14 NOTE — NURSING NOTE
Called Dr. Naranjo r/t lovechaparro due and pt states she may have to have surgery at [facility]. Asked if ok to give lovenox tonight; he states that it is ok to give lovenox tonight

## 2021-03-15 NOTE — PAYOR COMM NOTE
"Caitlyn Caro  [facility]  P: [phone]  F: [phone]    Ref#[id number]    Ariana Bailey (61 y.o. Female)     Date of Birth Social Security Number Address Home Phone MRN    1960  [id number]  [facility] [address] [phone] [id number]    Restorationist Marital Status          Christianity        Admission Date Admission Type Admitting Provider Attending Provider Department, Room/Bed    3/9/21 Emergency Tirso Herrera MD  [facility] 3 EAST, 372/1    Discharge Date Discharge Disposition Discharge Destination        3/14/2021 Transfer to Another Facility              Attending Provider: (none)   Allergies: Seroquel [Quetiapine], Avandia [Rosiglitazone], Morphine And Related, Oxycodone    Isolation: None   Infection: None   Code Status: Prior    Ht: 172.7 cm (67.99\")   Wt: 130 kg (285 lb 12.8 oz)    Admission Cmt: None   Principal Problem: Staphylococcus aureus bacteremia with sepsis (CMS/[facility]) [A41.01] More...                 Active Insurance as of 3/9/2021     Primary Coverage     Payor Plan Insurance Group Employer/Plan Group    [facility] BLUE [employer] EMPL[employer]EE [id number]     Payor Plan Address Payor Plan Phone Number Payor Plan Fax Number Effective Dates    [id number] [phone]  1/1/2015 - None Entered    [place] [address]       Subscriber Name Subscriber Birth Date Member ID       ARIANA BAILEY 1960 [id number]                 Emergency Contacts      (Rel.) Home Phone Work Phone Mobile Phone    Nadeem Bailey (Spouse) [phone] -- [phone]    Елена David (Sister) [phone] -- [phone]               Discharge Summary      Nick Faye MD at 03/13/21 2410     Attestation signed by [facility], Noemi Guillermo MD at 03/14/21 0934    I have seen the patient on the day of discharge.  I have reviewed the notes, assessments, and/or procedures performed by Dr. Faye, I concur with her/his " documentation and assessment and plan for Ariana Martinez.            This document has been electronically signed by Noemi Jones MD on 2021 05:15 CDT                       DISCHARGE SUMMARY    PATIENT NAME: Ariana Martinez       PHYSICIAN: Nick Faye MD  : 1960  MRN: [id number]    ADMITTED: 3/9/2021     DISCHARGED: 3/13/2021    ADMISSION DIAGNOSES:  Active Hospital Problems    Diagnosis  POA   • Acute bacterial endocarditis [I33.0]  Yes   • Staphylococcus aureus bacteremia [R78.81, B95.61]  Yes   • Infection due to Acinetobacter species [A49.8]  Yes   • Urinary retention [R33.9]  Yes   • Cellulitis of left lower extremity [L03.116]  Yes   • CAD (coronary artery disease) [I25.10]  Yes   • Hypertension [I10]  Yes   • (HFpEF) heart failure with preserved ejection fraction (CMS/HCC) [I50.30]  Yes   • Depression, major, recurrent, moderate (CMS/HCC) [F33.1]  Yes   • 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, with long-term current use of insulin (CMS/[facility]) [E11.49, E11.65, Z79.4]  Not Applicable      Resolved Hospital Problems    Diagnosis Date Resolved POA   • **Staphylococcus aureus bacteremia with sepsis (CMS/HCC) [A41.01] 2021 Yes   • CHON (acute kidney injury) (CMS/HCC) [N17.9] 2021 Yes   • Diabetic peripheral neuropathy associated with type 2 diabetes mellitus (CMS/[facility]) [E11.42] 03/10/2021 Yes     DISCHARGE DIAGNOSES:   Active Hospital Problems    Diagnosis  POA   • Acute bacterial endocarditis [I33.0]  Yes   • Staphylococcus aureus bacteremia [R78.81, B95.61]  Yes   • Infection due to Acinetobacter species [A49.8]  Yes   • Urinary retention [R33.9]  Yes   • Cellulitis of left lower extremity [L03.116]  Yes   • CAD (coronary artery disease) [I25.10]  Yes   • Hypertension [I10]  Yes   • (HFpEF) heart failure with preserved ejection fraction (CMS/HCC) [I50.30]  Yes   • Depression, major, recurrent, moderate (CMS/HCC) [F33.1]  Yes   •  "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, with long-term current use of insulin (CMS/[facility]) [E11.49, E11.65, Z79.4]  Not Applicable      Resolved Hospital Problems    Diagnosis Date Resolved POA   • **Staphylococcus aureus bacteremia with sepsis (CMS/[facility]) [A41.01] 03/13/2021 Yes   • CHON (acute kidney injury) (CMS/[facility]) [N17.9] 03/13/2021 Yes   • Diabetic peripheral neuropathy associated with type 2 diabetes mellitus (CMS/[facility]) [E11.42] 03/10/2021 Yes       SERVICE: Family Medicine Residency  Attending: Noemi Jones MD  Resident: Nick Faye MD    CONSULTS:   Consult Orders (all) (From admission, onward)     Start     Ordered    03/09/21 2049  Foot / Ankle (on-call MD unless specified)  Once     Specialty:  Podiatry  Provider:  Ignacio Lord DPM    03/09/21 2048                PROCEDURES:   CT lower extremity left without contrast-3/10/2021  Midline without port-3/10/2021  CT angiogram chest-3/9/2021  Ultrasound venous Doppler lower extremity left-3/9/2021  X-ray ankle 3+ view left-3/9/2021  X-ray foot 3+ view left-3/9/2021  ECG 12-lead-3/9/2021  Although transthoracic echo complete with contrast-3/11/2021    HISTORY OF PRESENT ILLNESS: Per Dr. Whitt's H&P 3/9/2021:    Ariana Martinez is a 61 y.o. female with a CMH of coronary artery disease, depression, hypertension, type 2 diabetes mellitus and a recent left ankle arthroscopy, exostectomy, hardware removal of the left foot on 2/26/2021 with Dr. Lord (Podiatry) who presents to the ED with complaints of left lower extremity pain and swelling. Patient states that last night she was hobbling on one leg from her bedside commode to her bed when she heard a \"pop\" on her left foot. Patient had a clinic appointment with Dr. Lord today for follow-up of recent surgery where he completed some blood work. Given that patient developed worsening of lower extremity pain and swelling, Dr. Lord recommended that patient go to the ED " to rule out DVT.      In the ED, patient was given Norco for pain, and started on Vanco and Zosyn.  She was also given 1 L bolus.  CTA chest did not show any evidence of PE.  Duplex venous ultrasound did not show any evidence of DVT.  Patient had a lactate of 2.1 and WBC of 22.      DIAGNOSTIC DATA:   Lab Results (last 24 hours)     Procedure Component Value Units Date/Time    POC Glucose Once [[id number]]  (Normal) Collected: 03/13/21 1722    Specimen: Blood Updated: 03/13/21 1736     Glucose 86 mg/dL      Comment: : [id number] LYLE GILMANMeter ID: [id number]       Blood Culture - Blood, Wrist, Left [[id number]] Collected: 03/11/21 1455    Specimen: Blood from Wrist, Left Updated: 03/13/21 1515     Blood Culture No growth at 2 days    Blood Culture - Blood, Hand, Right [[id number]] Collected: 03/11/21 1446    Specimen: Blood from Hand, Right Updated: 03/13/21 1500     Blood Culture No growth at 2 days    MRSA Screen, PCR (Inpatient) - Swab, Nares [[id number]]  (Normal) Collected: 03/13/21 1230    Specimen: Swab from Nares Updated: 03/13/21 1415     MRSA, PCR Negative    Narrative:      Performed by real-time polymerase chain reaction (qPCR).    Wound Culture - Wound, Ankle, Left [[id number]]  (Abnormal) Collected: 03/12/21 1129    Specimen: Wound from Ankle, Left Updated: 03/13/21 1344     Wound Culture Scant growth (1+) Staphylococcus aureus     Gram Stain Moderate (3+) WBCs seen      Few (2+) Gram positive cocci in pairs    Urinalysis, Microscopic Only - Urine, Catheter [[id number]]  (Abnormal) Collected: 03/13/21 1230    Specimen: Urine, Catheter Updated: 03/13/21 1254     RBC, UA 6-12 /HPF      WBC, UA 3-5 /HPF      Bacteria, UA None Seen /HPF      Squamous Epithelial Cells, UA None Seen /HPF      Hyaline Casts, UA 0-2 /LPF      Methodology Automated Microscopy    Urinalysis With Culture If Indicated - Urine, Catheter [[id number]]  (Abnormal) Collected: 03/13/21 1230    Specimen: Urine, Catheter Updated:  03/13/21 1252     Color, UA Yellow     Appearance, UA Clear     pH, UA 5.5     Specific Gravity, UA 1.013     Glucose, UA Negative     Ketones, UA Negative     Bilirubin, UA Negative     Blood, UA Trace     Protein, UA Negative     Leuk Esterase, UA Negative     Nitrite, UA Negative     Urobilinogen, UA 1.0 E.U./dL    POC Glucose Once [[id number]]  (Abnormal) Collected: 03/13/21 1050    Specimen: Blood Updated: 03/13/21 1129     Glucose 151 mg/dL      Comment: RN NotifiedOperator: [id number] LYLE Hanley ID: [id number]       Comprehensive Metabolic Panel [[id number]]  (Abnormal) Collected: 03/13/21 0721    Specimen: Blood Updated: 03/13/21 0802     Glucose 180 mg/dL      BUN 10 mg/dL      Creatinine 1.04 mg/dL      Sodium 140 mmol/L      Potassium 3.7 mmol/L      Chloride 106 mmol/L      CO2 25.0 mmol/L      Calcium 8.5 mg/dL      Total Protein 5.8 g/dL      Albumin 2.60 g/dL      ALT (SGPT) 11 U/L      AST (SGOT) 13 U/L      Alkaline Phosphatase 107 U/L      Total Bilirubin 0.3 mg/dL      eGFR Non African Amer 54 mL/min/1.73      Globulin 3.2 gm/dL      A/G Ratio 0.8 g/dL      BUN/Creatinine Ratio 9.6     Anion Gap 9.0 mmol/L     Narrative:      GFR Normal >60  Chronic Kidney Disease <60  Kidney Failure <15      C-reactive Protein [[id number]]  (Abnormal) Collected: 03/13/21 0721    Specimen: Blood Updated: 03/13/21 0802     C-Reactive Protein 19.16 mg/dL     CBC & Differential [[id number]]  (Abnormal) Collected: 03/13/21 0721    Specimen: Blood Updated: 03/13/21 0743    Narrative:      The following orders were created for panel order CBC & Differential.  Procedure                               Abnormality         Status                     ---------                               -----------         ------                     CBC Auto Differential[[id number]]        Abnormal            Final result                 Please view results for these tests on the individual orders.    CBC Auto Differential  [[id number]]  (Abnormal) Collected: 03/13/21 0721    Specimen: Blood Updated: 03/13/21 0743     WBC 11.47 10*3/mm3      RBC 3.33 10*6/mm3      Hemoglobin 10.0 g/dL      Hematocrit 30.2 %      MCV 90.7 fL      MCH 30.0 pg      MCHC 33.1 g/dL      RDW 13.6 %      RDW-SD 45.4 fl      MPV 9.6 fL      Platelets 374 10*3/mm3      Neutrophil % 77.3 %      Lymphocyte % 11.6 %      Monocyte % 8.9 %      Eosinophil % 1.1 %      Basophil % 0.6 %      Immature Grans % 0.5 %      Neutrophils, Absolute 8.86 10*3/mm3      Lymphocytes, Absolute 1.33 10*3/mm3      Monocytes, Absolute 1.02 10*3/mm3      Eosinophils, Absolute 0.13 10*3/mm3      Basophils, Absolute 0.07 10*3/mm3      Immature Grans, Absolute 0.06 10*3/mm3      nRBC 0.0 /100 WBC     POC Glucose Once [[id number]]  (Abnormal) Collected: 03/13/21 0636    Specimen: Blood Updated: 03/13/21 0653     Glucose 184 mg/dL      Comment: RN NotifiedOperator: [id number] GILDA Romero ID: [id number]       POC Glucose Once [[id number]]  (Abnormal) Collected: 03/13/21 0044    Specimen: Blood Updated: 03/13/21 0058     Glucose 309 mg/dL      Comment: : [id number] SUMMERS ANDREAMeter ID: [id number]           Microbiology Results (last 10 days)     Procedure Component Value - Date/Time    MRSA Screen, PCR (Inpatient) - Swab, Nares [[id number]]  (Normal) Collected: 03/13/21 1230    Lab Status: Final result Specimen: Swab from Nares Updated: 03/13/21 1415     MRSA, PCR Negative    Narrative:      Performed by real-time polymerase chain reaction (qPCR).    Wound Culture - Wound, Ankle, Left [[id number]]  (Abnormal) Collected: 03/12/21 1129    Lab Status: Preliminary result Specimen: Wound from Ankle, Left Updated: 03/13/21 1344     Wound Culture Scant growth (1+) Staphylococcus aureus     Gram Stain Moderate (3+) WBCs seen      Few (2+) Gram positive cocci in pairs    Blood Culture - Blood, Wrist, Left [[id number]] Collected: 03/11/21 2572    Lab Status: Preliminary result  Specimen: Blood from Wrist, Left Updated: 03/13/21 1515     Blood Culture No growth at 2 days    Blood Culture - Blood, Hand, Right [[id number]] Collected: 03/11/21 1446    Lab Status: Preliminary result Specimen: Blood from Hand, Right Updated: 03/13/21 1500     Blood Culture No growth at 2 days    Blood Culture - Blood, Blood, Venous Line [[id number]]  (Abnormal) Collected: 03/09/21 1931    Lab Status: Final result Specimen: Blood, Venous Line Updated: 03/12/21 1413     Blood Culture Staphylococcus aureus     Comment: Infectious disease consultation is highly recommended to rule out distant foci of infection.        Isolated from Aerobic and Anaerobic Bottles     Gram Stain Anaerobic Bottle Gram positive cocci in clusters     Comment: 3 bottles positive of 4 drawn. Plated to BAP, HEIDI BAP,CHET,MAC. 3/10/21 at 2040.         Aerobic Bottle Gram positive cocci in clusters     Comment: 4 out of 4 bottle drawn positive for gram positive cocci in clusters       Narrative:      Refer to previous blood culture collected on 3/9/2021 2031 for MICs.      Blood Culture - Blood, Blood, Venous Line [[id number]]  (Abnormal)  (Susceptibility) Collected: 03/09/21 1931    Lab Status: Final result Specimen: Blood, Venous Line Updated: 03/12/21 0938     Blood Culture Staphylococcus aureus     Comment: Infectious disease consultation is highly recommended to rule out distant foci of infection.        Isolated from Aerobic and Anaerobic Bottles     Gram Stain Anaerobic Bottle Gram positive cocci in clusters     Comment: 1 positive of 4 drawn for gram positive cocci          Aerobic Bottle Gram positive cocci in clusters     Comment: 2 positive of 4 drawn for gram positive cocci       Susceptibility      Staphylococcus aureus      SHITAL      Gentamicin Susceptible      Oxacillin Susceptible      Rifampin Susceptible      Vancomycin Susceptible               Linear View               Susceptibility Comments     Staphylococcus aureus    This  isolate does not demonstrate inducible clindamycin resistance in vitro.    This isolate does not demonstrate inducible clindamycin resistance in vitro.               Blood Culture ID, PCR - Blood, Blood, Venous Line [[id number]]  (Abnormal) Collected: 03/09/21 1931    Lab Status: Final result Specimen: Blood, Venous Line Updated: 03/10/21 1353     BCID, PCR Staphylococcus aureus. Identification by BCID PCR.    COVID-19 and FLU A/B PCR - Swab, Nasopharynx [[id number]]  (Normal) Collected: 03/09/21 1922    Lab Status: Final result Specimen: Swab from Nasopharynx Updated: 03/09/21 2003     COVID19 Not Detected     Influenza A PCR Not Detected     Influenza B PCR Not Detected    Narrative:      Fact sheet for providers: https://www.fda.gov/media/444885/download    Fact sheet for patients: https://www.fda.gov/media/290139/download    Test performed by PCR.    Wound Culture - Wound, Foot [[id number]]  (Abnormal)  (Susceptibility) Collected: 03/09/21 1650    Lab Status: Final result Specimen: Wound from Foot Updated: 03/11/21 0728     Wound Culture Light growth (2+) Staphylococcus aureus      Scant growth (1+) Acinetobacter baumannii     Gram Stain Rare (1+) WBCs seen      Rare (1+) Gram positive cocci in pairs    Susceptibility      Staphylococcus aureus Acinetobacter baumannii      SHITAL SHITAL      Ampicillin + Sulbactam  Susceptible      Cefepime  Susceptible      Ceftazidime  Susceptible      Ciprofloxacin  Susceptible      Clindamycin Susceptible       Erythromycin Susceptible       Gentamicin  Susceptible      Inducible Clindamycin Resistance Negative       Levofloxacin  Susceptible      Meropenem  Susceptible      Oxacillin Susceptible       Penicillin G Resistant       Piperacillin + Tazobactam  Susceptible      Rifampin Susceptible       Tetracycline Susceptible Susceptible      Trimethoprim + Sulfamethoxazole Susceptible       Vancomycin Susceptible                  Linear View               Susceptibility Comments      Staphylococcus aureus    This isolate does not demonstrate inducible clindamycin resistance in vitro.                   US Guided Vascular Access    Result Date: 3/10/2021  Midline placement right cephalic vein. Vascular access device placed under ultrasound guidance as described above Electronically signed by:  Vamshi Rose MD  3/10/2021 5:03 PM CST Workstation: [id number]    US Venous Doppler Lower Extremity Left (duplex)    Result Date: 3/9/2021  No evidence of deep venous thrombosis in the common femoral, femoral, or popliteal veins of the left     lower extremity. Electronically signed by:  Naveed Taylor MD  3/9/2021 5:49 PM CST Workstation: [id number]    CT Angiogram Chest    Result Date: 3/9/2021  1.  No evidence of pulmonary embolism, though there is limited evaluation of segmental branches due to contrast phase.        2.  No evidence of acute pathology associated with the visualized aorta.    3. Linear to bandlike opacities in the upper lung zones probably represent atelectasis and/or scarring 4. Liver appears prominent, but is incompletely included in the study. 5. Pneumobilia, probably secondary to cholecystectomy and sphincterotomy. Electronically signed by:  Janki Molina MD  3/9/2021 8:54 PM CST Workstation: [id number]    CT Lower Extremity Left Without Contrast    Result Date: 3/10/2021  1. Slightly increased fragmentation of the talus and distal tibia previous study, including minimal destructive changes of the anterior aspect of the distal tibia. 2. Increasing joint fluid compared to the previous study, now with multiple tiny locules of gas, concerning for infection of joint fluid. 3. Slightly increasing fluid collection posterior to the tibiotalar joint, surrounding the Achilles tendon. 4. The posterior most calcaneal screw traversing the subtalar joint appears to have broken or crack since the previous study, though it is nondisplaced. [facility] requested to telephone these results to licensed caregiver  immediately at 6:18 PM EST on 3/16/2021. Electronically signed by:  Janki Molina MD  3/10/2021 5:19 PM CST Workstation: [id number]    Results for orders placed during the hospital encounter of 03/09/21    Adult Transthoracic Echo Complete W/ Cont if Necessary Per Protocol    Interpretation Summary  · Left ventricular ejection fraction appears to be 51 - 55%.  · Left ventricular diastolic function was normal.  · Estimated right ventricular systolic pressure from tricuspid regurgitation is normal (<35 mmHg).  · A small mitral valve mass is present on the anterior leaflet and is consistent with a vegetation.CLINICAL CORRELATION NEEDED.  Echocardiogram Findings    Left Ventricle Left ventricular ejection fraction appears to be 51 - 55%.   Normal left ventricular cavity size and wall thickness noted. All left ventricular wall segments contract normally. Left ventricular diastolic function was normal.   Right Ventricle Normal right ventricular cavity size, wall thickness, systolic function and septal motion noted.   Left Atrium Normal left atrial size and volume noted.   Right Atrium Normal right atrial cavity size noted.   Aortic Valve The aortic valve is abnormal in structure. The aortic valve exhibits sclerosis. There is mild calcification of the aortic valve. No aortic valve regurgitation is present. No hemodynamically significant aortic valve stenosis is present.   Mitral Valve There is mild calcification of the mitral valve.  A small mitral valve mass is present on the anterior leaflet and is consistent with a vegetation. Trace to mild mitral valve regurgitation is present. No significant mitral valve stenosis is present.   Tricuspid Valve The tricuspid valve is structurally normal with no significant stenosis present. Trace to mild tricuspid valve regurgitation is present. Estimated right ventricular systolic pressure from tricuspid regurgitation is normal (<35 mmHg).   Pulmonic Valve The pulmonic valve is  structurally normal with no regurgitation or significant stenosis present.   Greater Vessels No dilation of the aortic root is present.   Pericardium The pericardium is normal. There is no evidence of pericardial effusion. .        HOSPITAL COURSE:  Patient was admitted for further evaluation and treatment of her septic state with likely source being cellulitis of the left lower extremity.  She has had an ongoing course of treatment of left ankle issues including nonunion of subtalar arthrodesis, pes cavus, acquired subtalar varus with podiatry.  She recently had hardware removed.  CT of ankle was ordered and reviewed by podiatrist who irrigated and debrided the left ankle and removed residual talocalcaneal screw.  A posterior heel culture from outpatient office was growing MSSA and Acinetobacter and antibiotics were changed from Vanco and Zosyn to Unasyn.  Blood cultures obtained returned positive for MSSA.  An echocardiogram was obtained that showed a small vegetation growing on the mitral valve.  Antibiotics were changed to cefazolin 2 g every 8 hours to cover for infective endocarditis and 5 days of Levaquin 750 mg daily were ordered to cover for Acinetobacter.  Case was discussed with cardiologist who reviewed echocardiogram and agreed with finding of mitral valve vegetation without any evidence of surrounding abscess but recommended infectious disease consult.  Spoke with [facility] and discussed case with Dr. Andino who agreed to accept patient to a stepdown telemetry inpatient bed.  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  Discussed situation with patient who is agreeable with transfer and all questions answered.    DISCHARGE CONDITION:   Stable    DISPOSITION:  Transfer to Another Facility    DISCHARGE MEDICATIONS     Discharge Medications      New Medications       Instructions Start Date   ceFAZolin in 0.9% normal saline 2 GM/ 100 mL solution IVPB  Commonly known as: ANCEF   2 g, Intravenous, Every 8 Hours   Start Date: March 14, 2021     HYDROcodone-acetaminophen  MG per tablet  Commonly known as: NORCO   1 tablet, Oral, Every 6 Hours PRN      HYDROmorphone 1 MG/ML injection  Commonly known as: DILAUDID   0.5 mg, Intravenous, Every 2 Hours PRN      levoFLOXacin (LEVAQUIN) 750 mg/150 mL D5W (premix) 750 MG/150ML solution IVPB  Commonly known as: LEVAQUIN   750 mg, Intravenous, Once      lisinopril 10 MG tablet  Commonly known as: PRINIVIL,ZESTRIL   10 mg, Oral, Daily      naloxone 0.4 MG/ML injection  Commonly known as: NARCAN   0.2 mg, Intravenous, Every 5 Minutes PRN         Continue These Medications      Instructions Start Date   Accu-Chek Iris Plus test strip  Generic drug: glucose blood   USE TO CHECK BLOOD SUGAR 4 TIMES DAILY. ACCUCHECK, E11.9      ARIPiprazole 15 MG tablet  Commonly known as: ABILIFY   15 mg, Oral, Daily      aspirin 81 MG EC tablet   81 mg, Oral, Daily      atorvastatin 80 MG tablet  Commonly known as: LIPITOR   TAKE 1 TABLET BY MOUTH EVERY DAY      B-D ULTRAFINE III SHORT PEN 31G X 8 MM misc  Generic drug: Insulin Pen Needle   USE TO INJECT 4 TIMES A DAY      BD [employer] misc   1 each, Does not apply, Take As Directed      Citracal/Vitamin D 250-200 MG-UNIT tablet  Generic drug: Calcium Citrate-Vitamin D   2 tablets, Oral, 2 Times Daily      clopidogrel 75 MG tablet  Commonly known as: PLAVIX   75 mg, Oral, Daily      cyanocobalamin 1000 MCG/ML injection   1,000 mcg, Intramuscular, Every 28 Days      folic acid 1 MG tablet  Commonly known as: FOLVITE   1 mg, Oral, Daily      furosemide 40 MG tablet  Commonly known as: LASIX   TAKE 1 TABLET BY MOUTH EVERY DAY      gabapentin 400 MG capsule  Commonly known as: NEURONTIN   400 mg, Oral, 3 Times Daily      Glucagon Emergency 1 MG kit   USE AS DIRECTED AS NEEDED      glucose monitor  "monitoring kit   1 each, Does not apply, Daily, accucheck eve meter, E11.9      hydroCHLOROthiazide 12.5 MG tablet  Commonly known as: HYDRODIURIL   12.5 mg, Oral, Daily      Insulin Glargine 100 UNIT/ML injection pen  Commonly known as: BASAGLAR KWIKPEN   60 Units, Subcutaneous, Nightly      LORazepam 0.5 MG tablet  Commonly known as: ATIVAN   TAKE 1 TABLET BY MOUTH EVERY DAY AS NEEDED FOR ANXIETY      meclizine 25 MG tablet  Commonly known as: ANTIVERT   25 mg, Oral, 3 Times Daily PRN      metoclopramide 10 MG tablet  Commonly known as: REGLAN   TAKE 1 TABLET BY MOUTH FOUR TIMES A DAY AS NEEDED      metoprolol succinate XL 25 MG 24 hr tablet  Commonly known as: TOPROL-XL   TAKE 1 TABLET BY MOUTH EVERY DAY      mirtazapine 45 MG tablet  Commonly known as: REMERON   45 mg, Oral, Nightly      NovoLOG FlexPen 100 UNIT/ML solution pen-injector sc pen  Generic drug: insulin aspart   INJECT 60 UNITS BEFORE MEALS 3 TIMES A DAY      ondansetron 8 MG tablet  Commonly known as: ZOFRAN   8 mg, Oral, Every 8 Hours PRN      ONE TOUCH ULTRA MINI w/Device kit   USE AS DIRECTED TO CHECK BLOOD SUGAR      pantoprazole 20 MG EC tablet  Commonly known as: PROTONIX   TAKE 1 TABLET BY MOUTH EVERY DAY      Syringe/Needle (Disp) 25G X 5/8\" 3 ML misc  Commonly known as: Luer Lock Safety Syringes   1 each, Does not apply, Daily, 1 Q28 DAYS      traMADol 50 MG tablet  Commonly known as: ULTRAM   50 mg, Oral, Every 6 Hours PRN      venlafaxine  MG 24 hr capsule  Commonly known as: EFFEXOR-XR   150 mg, Oral, Every Morning      vitamin D 1.25 MG (26537 UT) capsule capsule  Commonly known as: ERGOCALCIFEROL   TAKE ONE CAPSULE BY MOUTH EVERY SUNDAY.         Stop These Medications    doxycycline 100 MG capsule  Commonly known as: VIBRAMYCIN            INSTRUCTIONS:  Activity:   Activity Instructions     Activity as Tolerated          Diet:   Diet Instructions     Diet: Consistent Carbohydrate      Discharge Diet: Consistent Carbohydrate    "       FOLLOW UP:   Follow-up Information     Ferguson, Kimberly Ventura, APRN .    Specialties: Family Medicine, Nurse Practitioner  Contact information:  [address] [place] [address]   Nicol FUENTES [address] [phone]                   PENDING TEST RESULTS AT DISCHARGE  Pending Labs     Order Current Status    Tissue Pathology Exam In process    Blood Culture - Blood, Hand, Right Preliminary result    Blood Culture - Blood, Wrist, Left Preliminary result    Wound Culture - Wound, Ankle, Left Preliminary result          Time: >30 minutes was spent in discharge planning, medication reconciliation and coordination of care for this patient.    Noemi Jones MD is the attending at time of discharge, She is aware of the patient's status and agrees with the above discharge summary.      Nick Faye M.D. PGY3  [facility]  [address] [id number]  Office: [phone]    This document has been electronically signed by Nick Faye MD on March 13, 2021 17:45 CST                Electronically signed by Noemi Jones MD at 03/14/21 0515       Discharge Order (From admission, onward)     Start     Ordered    03/13/21 1727  Discharge patient  Once     Expected Discharge Date: 03/13/21    Expected Discharge Time: Evening    Discharge Disposition: Transfer to Another Facility    Physician of Record for Attribution - Please select from Treatment Team: NOEMI JONES [[id number]]    Review needed by CMO to determine Physician of Record: No       Question Answer Comment   Physician of Record for Attribution - Please select from Treatment Team NOEMI JONES    Review needed by CMO to determine Physician of Record No        03/13/21 1729

## 2021-03-16 LAB
BACTERIA SPEC AEROBE CULT: NORMAL
BACTERIA SPEC AEROBE CULT: NORMAL

## 2021-03-22 LAB
LAB AP CASE REPORT: NORMAL
PATH REPORT.FINAL DX SPEC: NORMAL

## 2021-03-22 RX ORDER — METOCLOPRAMIDE 10 MG/1
TABLET ORAL
Qty: 120 TABLET | Refills: 1 | Status: SHIPPED | OUTPATIENT
Start: 2021-03-22 | End: 2021-05-17

## 2021-03-31 ENCOUNTER — TELEPHONE (OUTPATIENT)
Dept: PODIATRY | Facility: CLINIC | Age: 61
End: 2021-03-31

## 2021-04-05 DIAGNOSIS — I10 ESSENTIAL HYPERTENSION: Chronic | ICD-10-CM

## 2021-04-05 RX ORDER — FUROSEMIDE 40 MG/1
TABLET ORAL
Qty: 90 TABLET | Refills: 1 | Status: SHIPPED | OUTPATIENT
Start: 2021-04-05 | End: 2021-09-24

## 2021-04-08 ENCOUNTER — HOSPITAL ENCOUNTER (OUTPATIENT)
Dept: INTERVENTIONAL RADIOLOGY/VASCULAR | Facility: HOSPITAL | Age: 61
Discharge: HOME | End: 2021-04-08
Admitting: STUDENT IN AN ORGANIZED HEALTH CARE EDUCATION/TRAINING PROGRAM

## 2021-04-08 DIAGNOSIS — I82.90 CLOT: ICD-10-CM

## 2021-04-08 PROCEDURE — 96523 IRRIG DRUG DELIVERY DEVICE: CPT

## 2021-04-08 NOTE — PROGRESS NOTES
PICC WAS ACCESSED AND FLUSHED WITH 10ML OF NORMAL SALINE. PICC LINE FLUSHES AND ASPIRATES WITHOUT DIFFICULTY.   picc was placed at [facility].

## 2021-04-12 ENCOUNTER — TELEPHONE (OUTPATIENT)
Dept: FAMILY MEDICINE CLINIC | Facility: CLINIC | Age: 61
End: 2021-04-12

## 2021-04-12 DIAGNOSIS — F41.1 GENERALIZED ANXIETY DISORDER: ICD-10-CM

## 2021-04-12 RX ORDER — LORAZEPAM 0.5 MG/1
0.5 TABLET ORAL DAILY PRN
Qty: 30 TABLET | Refills: 0 | Status: SHIPPED | OUTPATIENT
Start: 2021-04-12 | End: 2021-05-10

## 2021-04-12 RX ORDER — BLOOD SUGAR DIAGNOSTIC
STRIP MISCELLANEOUS
Qty: 100 EACH | Refills: 3 | Status: SHIPPED | OUTPATIENT
Start: 2021-04-12 | End: 2021-07-15

## 2021-04-12 NOTE — TELEPHONE ENCOUNTER
Patient seen in office every 3 months for chronic anxiety requiring benzodiazepine anxiolytic. Compliant with medication, visits with no adverse effects noted. LESTER and UDS current and appropriate. Patient called requesting scheduled refill at appropriate interval. Patient understands the risks associated with this controlled medication, including tolerance and addiction.  Patient also agrees to only obtain this medication from me, and not from a another provider, unless that provider is covering for me in my absence.  Patient also agrees to be compliant in dosing, and not self adjust the dose of medication.  A signed controlled substance agreement is on file, and the patient has received a controlled substance education sheet at this a previous visit.  The patient has also signed a consent for treatment with a controlled substance as per [facility] policy. LESTER was obtained. Refill sent for lorazepam.     This document has been electronically signed by BEBE Palomino on April 12, 2021 18:49 CDT

## 2021-04-13 ENCOUNTER — TELEPHONE (OUTPATIENT)
Dept: FAMILY MEDICINE CLINIC | Facility: CLINIC | Age: 61
End: 2021-04-13

## 2021-04-14 ENCOUNTER — TELEPHONE (OUTPATIENT)
Dept: FAMILY MEDICINE CLINIC | Facility: CLINIC | Age: 61
End: 2021-04-14

## 2021-04-14 ENCOUNTER — IMMUNIZATION (OUTPATIENT)
Dept: VACCINE CLINIC | Facility: HOSPITAL | Age: 61
End: 2021-04-14

## 2021-04-14 DIAGNOSIS — M79.672 ACUTE POSTOPERATIVE PAIN OF LEFT FOOT: ICD-10-CM

## 2021-04-14 DIAGNOSIS — M54.41 CHRONIC RIGHT-SIDED LOW BACK PAIN WITH RIGHT-SIDED SCIATICA: Primary | ICD-10-CM

## 2021-04-14 DIAGNOSIS — G89.29 CHRONIC FOOT PAIN, LEFT: ICD-10-CM

## 2021-04-14 DIAGNOSIS — G89.18 ACUTE POSTOPERATIVE PAIN OF LEFT FOOT: ICD-10-CM

## 2021-04-14 DIAGNOSIS — G89.29 CHRONIC RIGHT-SIDED LOW BACK PAIN WITH RIGHT-SIDED SCIATICA: Primary | ICD-10-CM

## 2021-04-14 DIAGNOSIS — M79.672 CHRONIC FOOT PAIN, LEFT: ICD-10-CM

## 2021-04-14 PROCEDURE — 0001A: CPT | Performed by: THORACIC SURGERY (CARDIOTHORACIC VASCULAR SURGERY)

## 2021-04-14 PROCEDURE — 91300 HC SARSCOV02 VAC 30MCG/0.3ML IM: CPT | Performed by: THORACIC SURGERY (CARDIOTHORACIC VASCULAR SURGERY)

## 2021-04-14 RX ORDER — HYDROCODONE BITARTRATE AND ACETAMINOPHEN 7.5; 325 MG/1; MG/1
1 TABLET ORAL EVERY 6 HOURS PRN
Qty: 120 TABLET | Refills: 0 | Status: SHIPPED | OUTPATIENT
Start: 2021-04-14 | End: 2021-05-13 | Stop reason: SDUPTHER

## 2021-04-14 NOTE — TELEPHONE ENCOUNTER
Patient seen in office every 3 months for chronic pain requiring opiate pain medication. Compliant with medication, visits with no adverse effects noted. LESTER and UDS current and appropriate. Patient called requesting scheduled refill at appropriate interval. Patient understands the risks associated with this controlled medication, including tolerance and addiction.  Patient also agrees to only obtain this medication from me, and not from a another provider, unless that provider is covering for me in my absence.  Patient also agrees to be compliant in dosing, and not self adjust the dose of medication.  A signed controlled substance agreement is on file, and the patient has received a controlled substance education sheet at this a previous visit.  The patient has also signed a consent for treatment with a controlled substance as per [facility] policy. LESTER was obtained.   *Mrs. Martinez just came home from hospital care after another left foot surgery by Dr Lord, podiatry. She was issued a prescription for him for postop pain for hydrocodone 7.5/325mg 1 Q6H #120 but is contracted with me for pain, and has aske me to send instead. Pharmacy confirms prescription sent but held from Dr Lord. New Rx sent for hydrocodone.pharmacy given my number to call if problems with her insurance.      This document has been electronically signed by BEBE Palomino on April 14, 2021 10:46 CDT

## 2021-04-19 ENCOUNTER — OFFICE VISIT (OUTPATIENT)
Dept: PODIATRY | Facility: CLINIC | Age: 61
End: 2021-04-19

## 2021-04-19 ENCOUNTER — LAB (OUTPATIENT)
Dept: LAB | Facility: HOSPITAL | Age: 61
End: 2021-04-19

## 2021-04-19 VITALS — HEART RATE: 98 BPM | OXYGEN SATURATION: 98 % | WEIGHT: 285 LBS | HEIGHT: 68 IN | BODY MASS INDEX: 43.19 KG/M2

## 2021-04-19 DIAGNOSIS — M00.9 SEPTIC ARTHRITIS OF LEFT ANKLE, DUE TO UNSPECIFIED ORGANISM (HCC): Primary | ICD-10-CM

## 2021-04-19 DIAGNOSIS — M00.9 SEPTIC ARTHRITIS OF LEFT ANKLE, DUE TO UNSPECIFIED ORGANISM (HCC): ICD-10-CM

## 2021-04-19 DIAGNOSIS — D50.0 IRON DEFICIENCY ANEMIA DUE TO CHRONIC BLOOD LOSS: Primary | ICD-10-CM

## 2021-04-19 DIAGNOSIS — M96.0 NONUNION OF SUBTALAR ARTHRODESIS: ICD-10-CM

## 2021-04-19 DIAGNOSIS — M54.41 CHRONIC RIGHT-SIDED LOW BACK PAIN WITH RIGHT-SIDED SCIATICA: Chronic | ICD-10-CM

## 2021-04-19 DIAGNOSIS — M86.9 OSTEOMYELITIS OF ANKLE AND FOOT (HCC): ICD-10-CM

## 2021-04-19 DIAGNOSIS — G89.29 CHRONIC RIGHT-SIDED LOW BACK PAIN WITH RIGHT-SIDED SCIATICA: Chronic | ICD-10-CM

## 2021-04-19 PROCEDURE — 99024 POSTOP FOLLOW-UP VISIT: CPT | Performed by: PODIATRIST

## 2021-04-19 PROCEDURE — 82306 VITAMIN D 25 HYDROXY: CPT

## 2021-04-19 PROCEDURE — 86140 C-REACTIVE PROTEIN: CPT

## 2021-04-19 PROCEDURE — 84100 ASSAY OF PHOSPHORUS: CPT

## 2021-04-19 PROCEDURE — 36415 COLL VENOUS BLD VENIPUNCTURE: CPT

## 2021-04-19 PROCEDURE — 85025 COMPLETE CBC W/AUTO DIFF WBC: CPT

## 2021-04-19 PROCEDURE — 80053 COMPREHEN METABOLIC PANEL: CPT

## 2021-04-19 PROCEDURE — 85652 RBC SED RATE AUTOMATED: CPT

## 2021-04-19 NOTE — PROGRESS NOTES
Ariana Luis Martinez  1960  61 y.o. female   C. Ferguson, APRN 2/24/2021  BS - 178 per pt.    Patient returns to clinic for post op appointment left ankle.   04/19/2021        Chief Complaint   Patient presents with   • Left Ankle - Follow-up, Post-op       History of Present Illness    Ms Martinez is a 62 y/o diabetic female who previously underwent left ankle arthroscopy, exostectomy, hardware removal left foot.  Date of surgery 2/26/2021.  This procedure was complicated by postoperative infection patient developed a septic ankle.  She was subsequently admitted following her last office visit and treated with incision and drainage.  Patient was transferred to [facility] in [place] due to concern of possible bacterial vegetation.  She had a PICC line placed and has been on IV antibiotics since that time.  She is set to complete her 6-week course at the end of this week.  She has been largely nonweightbearing in a posterior splint since that time.    Past Medical History:   Diagnosis Date   • Angina, class IV (CMS/HCC)    • Anxiety    • Anxiety and depression    • Arthritis    • Benign paroxysmal positional vertigo    • Bladder disorder     has bladder stimulator   • Carpal tunnel syndrome    • CHF (congestive heart failure) (CMS/HCC)    • Chronic pain    • Coronary atherosclerosis     hx CABG 2005.  is followed by Dr Kwon   • Depression    • Diabetes mellitus (CMS/HCC)     Type 2, controlled   • Diabetic polyneuropathy (CMS/HCC)    • Disease of thyroid gland    • Elevated cholesterol    • Female stress incontinence    • Foot pain, left    • Full dentures    • Gastroparesis    • GERD (gastroesophageal reflux disease)    • Hyperlipidemia    • Hypertension    • Low back pain    • Malaise and fatigue    • Multiple joint pain    • Obesity     Refuses to be weighed   • Otalgia     Both   • Perforation of tympanic membrane     Left   • Postoperative wound infection    • Vitamin D deficiency    • Wears glasses      reading         Past Surgical History:   Procedure Laterality Date   • ABDOMINAL SURGERY     • ANGIOPLASTY      coronary   • ANKLE ARTHROSCOPY Left 2/26/2021    Procedure: Left foot hardware removal, ankle arthroscopy, bone grafting , left foot exostectomy;  Surgeon: Ignacio Lord DPM;  Location: [facility] OR;  Service: Podiatry;  Laterality: Left;   • BREAST BIOPSY Right    • CARDIAC CATHETERIZATION     • CARDIAC CATHETERIZATION N/A 6/20/2017    Procedure: Right Heart Cath;  Surgeon: Can Kwon MD PhD;  Location: [facility] CATH INVASIVE LOCATION;  Service:    • CARDIAC CATHETERIZATION N/A 2/18/2020    Procedure: Left Heart Cath;  Surgeon: Catalina Cooper MD;  Location: [facility] CATH INVASIVE LOCATION;  Service: Cardiology;  Laterality: N/A;   • CARPAL TUNNEL RELEASE     • CHOLECYSTECTOMY     • COLONOSCOPY N/A 6/24/2020    Procedure: COLONOSCOPY;  Surgeon: Julián Maldonado MD;  Location: [facility] ENDOSCOPY;  Service: Gastroenterology;  Laterality: N/A;   • CORONARY ARTERY BYPASS GRAFT  2005   • ENDOSCOPY N/A 10/19/2018    Procedure: ESOPHAGOGASTRODUODENOSCOPY possible dilation;  Surgeon: Julián Maldonado MD;  Location: [facility] ENDOSCOPY;  Service: Gastroenterology   • ENDOSCOPY N/A 6/24/2020    Procedure: ESOPHAGOGASTRODUODENOSCOPY WED appt please;  Surgeon: Julián Maldonado MD;  Location: [facility] ENDOSCOPY;  Service: Gastroenterology;  Laterality: N/A;   • ENDOSCOPY AND COLONOSCOPY     • FOOT SURGERY      Toes   • FOOT SURGERY     • GASTRIC BANDING      Revision, laparoscopic   • HYSTERECTOMY     • INCISION AND DRAINAGE LEG Left 3/12/2021    Procedure: Left ankle arthroscopic irrigation and debridement, screw removal;  Surgeon: Ignacio Lord DPM;  Location: [facility] OR;  Service: Podiatry;  Laterality: Left;   • MOUTH SURGERY     • SALPINGO OOPHORECTOMY     • SHOULDER SURGERY     • SUBTALAR ARTHRODESIS Left 1/16/2019    Procedure: LEFT FOOT HARDWARE REMOVAL, FIFTH METATARSAL , OPEN REDUCTION  INTERNAL FIXATION, CALCANEAL OSTEOTOMY;  Surgeon: Ignacio Lord DPM;  Location: [facility];  Service: Podiatry   • SUBTALAR ARTHRODESIS Left 10/16/2019    Procedure: foot hardware removal, subtalar joint fusion  possible de/reattachment of achilles tendon        (c-arm);  Surgeon: Ignacio Lord DPM;  Location: [facility] OR;  Service: Podiatry   • SUBTALAR ARTHRODESIS Left 9/30/2020    Procedure: subtalar, talonavicular joint arthrodesis.  Removal hardware.          (c-arm);  Surgeon: Ignacio Lord DPM;  Location: [facility];  Service: Podiatry;  Laterality: Left;   • TRANSESOPHAGEAL ECHOCARDIOGRAM (LAMONTE)      With color flow         Family History   Problem Relation Age of Onset   • Diabetes Other    • Heart disease Other    • Hypertension Other    • Heart disease Mother    • Stroke Mother    • Hypertension Mother    • Diabetes Sister    • Heart disease Sister    • Hypertension Sister    • Heart disease Sister    • Diabetes Sister    • Hypertension Sister    • Diabetes Sister    • Diabetes Sister    • Diabetes Sister    • Diabetes Sister          Social History     Socioeconomic History   • Marital status:      Spouse name: Not on file   • Number of children: Not on file   • Years of education: Not on file   • Highest education level: Not on file   Tobacco Use   • Smoking status: Never Smoker   • Smokeless tobacco: Never Used   Vaping Use   • Vaping Use: Never used   Substance and Sexual Activity   • Alcohol use: No   • Drug use: No   • Sexual activity: Defer         Current Outpatient Medications   Medication Sig Dispense Refill   • ARIPiprazole (ABILIFY) 15 MG tablet Take 1 tablet by mouth Daily. 90 tablet 1   • aspirin (aspirin) 81 MG EC tablet Take 81 mg by mouth Daily.     • atorvastatin (LIPITOR) 80 MG tablet TAKE 1 TABLET BY MOUTH EVERY DAY 90 tablet 1   • BD SHARPS CONTAINER HOME misc 1 each Take As Directed. 1 each 0   • Blood Glucose Monitoring Suppl (ONE TOUCH ULTRA MINI) w/Device kit  USE AS DIRECTED TO CHECK BLOOD SUGAR 1 each 0   • Calcium Citrate-Vitamin D (CITRACAL/VITAMIN D) 250-200 MG-UNIT tablet Take 2 tablets by mouth 2 (two) times a day.     • ceFAZolin in 0.9% normal saline (ANCEF) 2 GM/ 100 mL solution IVPB Infuse 100 mL into a venous catheter Every 8 (Eight) Hours for 125 doses. Indications: Endocarditis 9000 mL 1   • clopidogrel (PLAVIX) 75 MG tablet Take 1 tablet by mouth Daily. 30 tablet 5   • cyanocobalamin 1000 MCG/ML injection INJECT 1 ML INTO THE APPROPRIATE MUSCLE AS DIRECTED BY PRESCRIBER EVERY 28 (TWENTY-EIGHT) DAYS. 3 mL 0   • folic acid (FOLVITE) 1 MG tablet Take 1 tablet by mouth Daily. 90 tablet 1   • furosemide (LASIX) 40 MG tablet TAKE 1 TABLET BY MOUTH EVERY DAY 90 tablet 1   • gabapentin (NEURONTIN) 400 MG capsule Take 1 capsule by mouth 3 (Three) Times a Day. 90 capsule 2   • GLUCAGON EMERGENCY 1 MG injection USE AS DIRECTED AS NEEDED 1 kit 0   • glucose blood (Accu-Chek Iris Plus) test strip USE TO CHECK BLOOD SUGAR 4 TIMES DAILY. E11.9 100 each 3   • glucose monitor monitoring kit 1 each Daily. accucheck iris meter, E11.9 1 each 0   • hydroCHLOROthiazide (HYDRODIURIL) 12.5 MG tablet Take 12.5 mg by mouth Daily.     • HYDROcodone-acetaminophen (NORCO) 7.5-325 MG per tablet Take 1 tablet by mouth Every 6 (Six) Hours As Needed for Moderate Pain . 120 tablet 0   • Insulin Glargine (BASAGLAR KWIKPEN) 100 UNIT/ML injection pen Inject 60 Units under the skin into the appropriate area as directed Every Night.     • Insulin Pen Needle (B-D ULTRAFINE III SHORT PEN) 31G X 8 MM misc USE TO INJECT 4 TIMES A  each 11   • lisinopril (PRINIVIL,ZESTRIL) 10 MG tablet Take 1 tablet by mouth Daily. 30 tablet 0   • LORazepam (ATIVAN) 0.5 MG tablet Take 1 tablet by mouth Daily As Needed for Anxiety. 30 tablet 0   • meclizine (ANTIVERT) 25 MG tablet Take 1 tablet by mouth 3 (Three) Times a Day As Needed for dizziness. 90 tablet 6   • metoclopramide (REGLAN) 10 MG tablet TAKE 1  "TABLET BY MOUTH FOUR TIMES A DAY AS NEEDED 120 tablet 1   • metoprolol succinate XL (TOPROL-XL) 25 MG 24 hr tablet TAKE 1 TABLET BY MOUTH EVERY DAY 31 tablet 6   • mirtazapine (REMERON) 45 MG tablet TAKE 1 TABLET BY MOUTH EVERY NIGHT. 90 tablet 0   • naloxone (NARCAN) 0.4 MG/ML injection Infuse 0.5 mL into a venous catheter Every 5 (Five) Minutes As Needed for Opioid Reversal.     • NOVOLOG FLEXPEN 100 UNIT/ML solution pen-injector sc pen INJECT 60 UNITS BEFORE MEALS 3 TIMES A  mL 3   • ondansetron (ZOFRAN) 8 MG tablet TAKE 1 TABLET BY MOUTH EVERY 8 (EIGHT) HOURS AS NEEDED FOR NAUSEA OR VOMITING. 18 tablet 1   • pantoprazole (PROTONIX) 20 MG EC tablet TAKE 1 TABLET BY MOUTH EVERY DAY 90 tablet 3   • Syringe/Needle, Disp, (Luer Lock Safety Syringes) 25G X 5/8\" 3 ML misc 1 each Daily. 1 Q28 DAYS 1 each 2   • traMADol (ULTRAM) 50 MG tablet Take 1 tablet by mouth Every 6 (Six) Hours As Needed for Moderate Pain  (pain). 30 tablet 0   • venlafaxine XR (EFFEXOR-XR) 150 MG 24 hr capsule Take 150 mg by mouth Every Morning.     • vitamin D (ERGOCALCIFEROL) 1.25 MG (39391 UT) capsule capsule TAKE ONE CAPSULE BY MOUTH EVERY SUNDAY. 12 capsule 2     No current facility-administered medications for this visit.     Review of Systems   Constitutional: Negative.    HENT: Negative.    Eyes: Negative.    Respiratory: Negative.    Cardiovascular: Negative.    Gastrointestinal: Negative.    Endocrine: Negative.    Genitourinary: Negative.    Musculoskeletal: Negative.    Skin: Negative.    Neurological: Positive for numbness.   Psychiatric/Behavioral: Negative.          OBJECTIVE    Pulse 98   Ht 172.7 cm (67.99\")   Wt 129 kg (285 lb)   SpO2 98%   BMI 43.35 kg/m²       Physical Exam   Constitutional: she appears well-developed and well-nourished.   HEENT: Normocephalic. Atraumatic.  CV: No CP. RRR  Resp: Non-labored respirations.  Psychiatric: she has a normal mood and affect. her behavior is normal.           Lower Extremity " Exam:  Pulses palpable.  Capillary fill time within normal limits.  Mild to moderate  left foot and ankle edema.    Minimal erythema  Subtalar, talonavicular joint rectus, rigid. Moderate residual pes cavus.   Instability noted to left ankle joint  Small partial-thickness ulceration at plantar lateral midfoot.  No signs of infection              Procedures         ASSESSMENT AND PLAN    Diagnoses and all orders for this visit:    1. Septic arthritis of left ankle, due to unspecified organism (CMS/[facility]) (Primary)  -     CBC & Differential; Future  -     Comprehensive Metabolic Panel; Future  -     Sedimentation Rate; Future  -     C-reactive Protein; Future    2. Nonunion of subtalar arthrodesis  -     Vitamin D 25 Hydroxy; Future  -     Calcium; Future  -     Phosphorus; Future    3. Osteomyelitis of ankle and foot (CMS/[facility])        -Patient seems to be progressing well with treatment of septic ankle.  Has PICC line in place and is completing 6 weeks IV antibiotics.  -Patient states she has not had any labs drawn since she was discharged 2 weeks ago.  We will recheck labs today.  -Replace splint, continue strict nonweightbearing.  Did have brief discussion that following completion of antibiotics patient will likely require transition to TTC arthrodesis  -Recheck 1 week          This document has been electronically signed by Ignacio Lord DPM on April 20, 2021 07:57 CDT

## 2021-04-20 ENCOUNTER — TELEPHONE (OUTPATIENT)
Dept: FAMILY MEDICINE CLINIC | Facility: CLINIC | Age: 61
End: 2021-04-20

## 2021-04-20 LAB
25(OH)D3 SERPL-MCNC: 33 NG/ML
ALBUMIN SERPL-MCNC: 4.4 G/DL (ref 3.5–5.2)
ALBUMIN/GLOB SERPL: 1.3 G/DL
ALP SERPL-CCNC: 158 U/L (ref 39–117)
ALT SERPL W P-5'-P-CCNC: 37 U/L (ref 1–33)
ANION GAP SERPL CALCULATED.3IONS-SCNC: 14.6 MMOL/L (ref 5–15)
AST SERPL-CCNC: 25 U/L (ref 1–32)
BASOPHILS # BLD AUTO: 0.05 10*3/MM3 (ref 0–0.2)
BASOPHILS NFR BLD AUTO: 0.4 % (ref 0–1.5)
BILIRUB SERPL-MCNC: 0.3 MG/DL (ref 0–1.2)
BUN SERPL-MCNC: 10 MG/DL (ref 6–20)
BUN/CREAT SERPL: 13.7 (ref 7–25)
CALCIUM SPEC-SCNC: 10.4 MG/DL (ref 8.6–10.5)
CHLORIDE SERPL-SCNC: 96 MMOL/L (ref 98–107)
CO2 SERPL-SCNC: 26.4 MMOL/L (ref 22–29)
CREAT SERPL-MCNC: 0.73 MG/DL (ref 0.57–1)
CRP SERPL-MCNC: 0.31 MG/DL (ref 0–0.5)
DEPRECATED RDW RBC AUTO: 43.8 FL (ref 37–54)
EOSINOPHIL # BLD AUTO: 0.47 10*3/MM3 (ref 0–0.4)
EOSINOPHIL NFR BLD AUTO: 3.8 % (ref 0.3–6.2)
ERYTHROCYTE [DISTWIDTH] IN BLOOD BY AUTOMATED COUNT: 13.3 % (ref 12.3–15.4)
ERYTHROCYTE [SEDIMENTATION RATE] IN BLOOD: 32 MM/HR (ref 0–30)
GFR SERPL CREATININE-BSD FRML MDRD: 82 ML/MIN/1.73
GLOBULIN UR ELPH-MCNC: 3.5 GM/DL
GLUCOSE SERPL-MCNC: 402 MG/DL (ref 65–99)
HCT VFR BLD AUTO: 43.7 % (ref 34–46.6)
HGB BLD-MCNC: 14.1 G/DL (ref 12–15.9)
IMM GRANULOCYTES # BLD AUTO: 0.03 10*3/MM3 (ref 0–0.05)
IMM GRANULOCYTES NFR BLD AUTO: 0.2 % (ref 0–0.5)
LYMPHOCYTES # BLD AUTO: 2.18 10*3/MM3 (ref 0.7–3.1)
LYMPHOCYTES NFR BLD AUTO: 17.7 % (ref 19.6–45.3)
MCH RBC QN AUTO: 28.8 PG (ref 26.6–33)
MCHC RBC AUTO-ENTMCNC: 32.3 G/DL (ref 31.5–35.7)
MCV RBC AUTO: 89.2 FL (ref 79–97)
MONOCYTES # BLD AUTO: 0.6 10*3/MM3 (ref 0.1–0.9)
MONOCYTES NFR BLD AUTO: 4.9 % (ref 5–12)
NEUTROPHILS NFR BLD AUTO: 73 % (ref 42.7–76)
NEUTROPHILS NFR BLD AUTO: 8.98 10*3/MM3 (ref 1.7–7)
NRBC BLD AUTO-RTO: 0 /100 WBC (ref 0–0.2)
PHOSPHATE SERPL-MCNC: 3.6 MG/DL (ref 2.5–4.5)
PLATELET # BLD AUTO: 380 10*3/MM3 (ref 140–450)
PMV BLD AUTO: 10.5 FL (ref 6–12)
POTASSIUM SERPL-SCNC: 4 MMOL/L (ref 3.5–5.2)
PROT SERPL-MCNC: 7.9 G/DL (ref 6–8.5)
RBC # BLD AUTO: 4.9 10*6/MM3 (ref 3.77–5.28)
SODIUM SERPL-SCNC: 137 MMOL/L (ref 136–145)
WBC # BLD AUTO: 12.31 10*3/MM3 (ref 3.4–10.8)

## 2021-04-20 RX ORDER — GABAPENTIN 400 MG/1
400 CAPSULE ORAL 3 TIMES DAILY
Qty: 90 CAPSULE | Refills: 2 | Status: SHIPPED | OUTPATIENT
Start: 2021-04-20 | End: 2021-09-09 | Stop reason: SDUPTHER

## 2021-04-20 RX ORDER — ERGOCALCIFEROL 1.25 MG/1
CAPSULE ORAL
Qty: 12 CAPSULE | Refills: 2 | Status: SHIPPED | OUTPATIENT
Start: 2021-04-20 | End: 2021-12-14

## 2021-04-20 NOTE — TELEPHONE ENCOUNTER
Patient has chronic sciatica, neuropathy or RLS requiring gabapentin use, concurrently with chronic pain requiring opiate pain medication. Patient has been educated regarding potential dangers of oversedation and accidental overdose with use of both medications concurrently. Serial assessments of patient has yielded no sign of oversedation or adverse effects of patient, and he/she is advised and aware to notify my office immediately if symptoms do occur, as well as to discontinue use of  gabapentin and opiate medication immediately if that occurs, pending medical evaluation and advice. Patient has been compliant with use of medications, UDS, visits with no adverse effects noted. Patient understands the risks associated with this controlled medication, including tolerance and addiction.  Patient also agrees to only obtain this medication from me, and not from a another provider, unless that provider is covering for me in my absence.  Patient also agrees to be compliant in dosing, and not self adjust the dose of medication.  A signed controlled substance agreement is on file, and the patient has received a controlled substance education sheet at this a previous visit.  The patient has also signed a consent for treatment with a controlled substance as per [facility] policy. LESTER was obtained. Refills were sent for: gabapentin.     This document has been electronically signed by BEBE Palomino on April 20, 2021 11:24 CDT

## 2021-04-22 ENCOUNTER — LAB (OUTPATIENT)
Dept: ONCOLOGY | Facility: HOSPITAL | Age: 61
End: 2021-04-22

## 2021-04-22 ENCOUNTER — OFFICE VISIT (OUTPATIENT)
Dept: ONCOLOGY | Facility: CLINIC | Age: 61
End: 2021-04-22

## 2021-04-22 VITALS
TEMPERATURE: 97.4 F | BODY MASS INDEX: 43.34 KG/M2 | DIASTOLIC BLOOD PRESSURE: 90 MMHG | SYSTOLIC BLOOD PRESSURE: 140 MMHG | RESPIRATION RATE: 18 BRPM | HEIGHT: 68 IN | HEART RATE: 76 BPM

## 2021-04-22 DIAGNOSIS — E53.8 CYANOCOBALAMIN DEFICIENCY: ICD-10-CM

## 2021-04-22 DIAGNOSIS — E61.1 IRON DEFICIENCY: Primary | ICD-10-CM

## 2021-04-22 DIAGNOSIS — D50.0 IRON DEFICIENCY ANEMIA DUE TO CHRONIC BLOOD LOSS: ICD-10-CM

## 2021-04-22 LAB
BASOPHILS # BLD AUTO: 0.05 10*3/MM3 (ref 0–0.2)
BASOPHILS NFR BLD AUTO: 0.5 % (ref 0–1.5)
DEPRECATED RDW RBC AUTO: 41.8 FL (ref 37–54)
EOSINOPHIL # BLD AUTO: 0.43 10*3/MM3 (ref 0–0.4)
EOSINOPHIL NFR BLD AUTO: 4.1 % (ref 0.3–6.2)
ERYTHROCYTE [DISTWIDTH] IN BLOOD BY AUTOMATED COUNT: 13.4 % (ref 12.3–15.4)
FERRITIN SERPL-MCNC: 351 NG/ML (ref 13–150)
FOLATE SERPL-MCNC: >20 NG/ML (ref 4.78–24.2)
HCT VFR BLD AUTO: 38.3 % (ref 34–46.6)
HGB BLD-MCNC: 12.7 G/DL (ref 12–15.9)
IMM GRANULOCYTES # BLD AUTO: 0.02 10*3/MM3 (ref 0–0.05)
IMM GRANULOCYTES NFR BLD AUTO: 0.2 % (ref 0–0.5)
IRON 24H UR-MRATE: 54 MCG/DL (ref 37–145)
IRON SATN MFR SERPL: 19 % (ref 20–50)
LYMPHOCYTES # BLD AUTO: 1.88 10*3/MM3 (ref 0.7–3.1)
LYMPHOCYTES NFR BLD AUTO: 18 % (ref 19.6–45.3)
MCH RBC QN AUTO: 28.4 PG (ref 26.6–33)
MCHC RBC AUTO-ENTMCNC: 33.2 G/DL (ref 31.5–35.7)
MCV RBC AUTO: 85.7 FL (ref 79–97)
MONOCYTES # BLD AUTO: 0.51 10*3/MM3 (ref 0.1–0.9)
MONOCYTES NFR BLD AUTO: 4.9 % (ref 5–12)
NEUTROPHILS NFR BLD AUTO: 7.53 10*3/MM3 (ref 1.7–7)
NEUTROPHILS NFR BLD AUTO: 72.3 % (ref 42.7–76)
NRBC BLD AUTO-RTO: 0 /100 WBC (ref 0–0.2)
PLATELET # BLD AUTO: 392 10*3/MM3 (ref 140–450)
PMV BLD AUTO: 9.7 FL (ref 6–12)
RBC # BLD AUTO: 4.47 10*6/MM3 (ref 3.77–5.28)
TIBC SERPL-MCNC: 279 MCG/DL (ref 298–536)
TRANSFERRIN SERPL-MCNC: 187 MG/DL (ref 200–360)
VIT B12 BLD-MCNC: 573 PG/ML (ref 211–946)
WBC # BLD AUTO: 10.42 10*3/MM3 (ref 3.4–10.8)

## 2021-04-22 PROCEDURE — 82607 VITAMIN B-12: CPT

## 2021-04-22 PROCEDURE — G0463 HOSPITAL OUTPT CLINIC VISIT: HCPCS | Performed by: NURSE PRACTITIONER

## 2021-04-22 PROCEDURE — 82746 ASSAY OF FOLIC ACID SERUM: CPT

## 2021-04-22 PROCEDURE — 85025 COMPLETE CBC W/AUTO DIFF WBC: CPT

## 2021-04-22 PROCEDURE — 82728 ASSAY OF FERRITIN: CPT

## 2021-04-22 PROCEDURE — 83540 ASSAY OF IRON: CPT

## 2021-04-22 PROCEDURE — 84466 ASSAY OF TRANSFERRIN: CPT

## 2021-04-22 PROCEDURE — 99212 OFFICE O/P EST SF 10 MIN: CPT | Performed by: NURSE PRACTITIONER

## 2021-04-22 RX ORDER — FOLIC ACID 1 MG/1
1 TABLET ORAL DAILY
Qty: 90 TABLET | Refills: 1 | Status: SHIPPED | OUTPATIENT
Start: 2021-04-22 | End: 2021-11-30 | Stop reason: SDUPTHER

## 2021-04-23 ENCOUNTER — TELEPHONE (OUTPATIENT)
Dept: ONCOLOGY | Facility: HOSPITAL | Age: 61
End: 2021-04-23

## 2021-04-23 NOTE — PROGRESS NOTES
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  Due to iron deficiency and inability to tolerate iron by mouth, patient received 2 dose of intravenous Feraheme on October 23, 2019 and October 30, 2019.    Recently seen in May and iron stores were dropping again and patient was given 2 additional doses of IV iron on 6/4/2020 and 6/12/2020. She also had upper and lower endoscopies 6/24/2020 with no identifiable source of bleeding    Since here last she has been hospitalized with foot infection and is currently on IV antibiotics at home; states she is due to finish IV antibiotic in few days.    Past Medical History, Past Surgical History, Social History, Family History have been reviewed and are without significant changes except as mentioned.    Review of Systems   A comprehensive 14 point review of systems was performed and was negative except as mentioned.  +fatigue and weakness  Denies any fever  Pain in left foot; states she is going to have to have additional surgery  Medications:  The current medication list was reviewed in the EMR    ALLERGIES:    Allergies   Allergen Reactions   • Seroquel [Quetiapine] Anaphylaxis   • Avandia [Rosiglitazone] Swelling   • Morphine And Related Hallucinations   • Oxycodone Hallucinations       Objective      Vitals:    04/22/21 1341   BP: 140/90   Pulse: 76   Resp: 18   Temp: 97.4 °F (36.3 °C)   TempSrc: Temporal   Weight: Comment: w/c   Height: 172.7 cm (67.99\")   PainSc:   6   PainLoc: Generalized     Current Status 4/22/2021   ECOG score 4 "       Physical Exam  GENERAL:  Not in any distress.     HEENT:  Normocephalic, Atraumatic.Mild Conjunctival pallor. No icterus.  No Facial Asymmetry noted.     NECK:  No adenopathy. No JVD.     RESPIRATORY:  Fair air entry bilateral. No rhonchi or wheezing.     CARDIOVASCULAR:  S1, S2. Regular rate and rhythm. No murmur or gallop appreciated.     ABDOMEN:  Soft, obese, nontender. Bowel sounds present in all four quadrants.  No organomegaly appreciated.     EXTREMITIES:  No edema.No Calf Tenderness.     NEUROLOGIC:  Alert, awake and oriented ×3.    SKIN : No new skin lesion identified    RECENT LABS:  Glucose   Date Value Ref Range Status   04/19/2021 402 (C) 65 - 99 mg/dL Final     Glucose, Arterial   Date Value Ref Range Status   10/14/2017 155 mmol/L Final     Sodium   Date Value Ref Range Status   04/19/2021 137 136 - 145 mmol/L Final     Potassium   Date Value Ref Range Status   04/19/2021 4.0 3.5 - 5.2 mmol/L Final   03/18/2021 3.6 3.5 - 5.4 MMOL/L Final     CO2   Date Value Ref Range Status   04/19/2021 26.4 22.0 - 29.0 mmol/L Final     Chloride   Date Value Ref Range Status   04/19/2021 96 (L) 98 - 107 mmol/L Final     Anion Gap   Date Value Ref Range Status   04/19/2021 14.6 5.0 - 15.0 mmol/L Final     Creatinine   Date Value Ref Range Status   04/19/2021 0.73 0.57 - 1.00 mg/dL Final     BUN   Date Value Ref Range Status   04/19/2021 10 6 - 20 mg/dL Final     BUN/Creatinine Ratio   Date Value Ref Range Status   04/19/2021 13.7 7.0 - 25.0 Final     Calcium   Date Value Ref Range Status   04/19/2021 10.4 8.6 - 10.5 mg/dL Final     eGFR Non  Amer   Date Value Ref Range Status   04/19/2021 82 >60 mL/min/1.73 Final     Alkaline Phosphatase   Date Value Ref Range Status   04/19/2021 158 (H) 39 - 117 U/L Final     Total Protein   Date Value Ref Range Status   04/19/2021 7.9 6.0 - 8.5 g/dL Final     ALT (SGPT)   Date Value Ref Range Status   04/19/2021 37 (H) 1 - 33 U/L Final     AST (SGOT)   Date Value Ref  Range Status   04/19/2021 25 1 - 32 U/L Final     Total Bilirubin   Date Value Ref Range Status   04/19/2021 0.3 0.0 - 1.2 mg/dL Final     Albumin   Date Value Ref Range Status   04/19/2021 4.40 3.50 - 5.20 g/dL Final     Globulin   Date Value Ref Range Status   04/19/2021 3.5 gm/dL Final     Lab Results   Component Value Date    WBC 10.42 04/22/2021    HGB 12.7 04/22/2021    HCT 38.3 04/22/2021    MCV 85.7 04/22/2021     04/22/2021     Lab Results   Component Value Date    NEUTROABS 7.53 (H) 04/22/2021    IRON 54 04/22/2021    IRON 89 12/14/2020    IRON 85 11/12/2020    TIBC 279 (L) 04/22/2021    TIBC 291 (L) 12/14/2020    TIBC 304 11/12/2020    LABIRON 19 (L) 04/22/2021    LABIRON 31 12/14/2020    LABIRON 28 11/12/2020    FERRITIN 351.00 (H) 04/22/2021    FERRITIN 379.30 (H) 12/14/2020    FERRITIN 418.50 (H) 11/12/2020    UERHOPEQ42 573 04/22/2021    DNTVQURC58 748 03/05/2021    ICSPFPRD08 625 12/14/2020    FOLATE >20.00 04/22/2021    FOLATE 10.30 12/14/2020    FOLATE 11.90 05/22/2020     Lab Results   Component Value Date    REFLABREPO SEE ATTACHED REPORT 09/30/2019    REFLABREPO SEE ATTACHED REPORT 09/30/2019               RADIOLOGY DATA :  No radiology results for the last 7 days        Assessment/Plan     1.  Thrombocytosis:  - Patient has intermittent thrombocytosis since 2015.  -Platelet count done today 4/22/2021 is normal at 392,000.  - Extensive work-up including Tony 2 mutation with exon 12, CALR mutation, MPL mutation done on September 30, 2019 is negative.  - At this point will continue with clinical monitoring.  Will ask patient to return to clinic in 4 months with repeat CBC and anemia work-up to be done     2.  Leukocytosis:  - Patient has persistent leukocytosis since 2015.  -White blood cell count is stable at 10,420.  CALR mutation, MPL mutation, Tony 2 mutation including exon 12 were negative.  At this point will continue with clinical monitoring.  - If patient has any worsening  leukocytosis or thrombocytosis in future, she will need bone marrow biopsy which was discussed with patient.     3.  Iron deficiency:  -Patient is found to have iron deficiency with iron saturation of only 11% and ferritin of 46.  Patient states in the past she is not able to tolerate iron by mouth.  -Patient has received multiple doses of IV iron; most recently in June 2020 with dropping iron stores.  -Hgb today is 12.7 with stable iron stores; continue to monitor.  -continue with  monthly B12 injection by primary medical provider.  -will send prescription for folic acid 1 mg daily  -will recheck labs again in 4 mos.               PHQ-9 Total Score: 0     Ariana Martinez reports a pain score of 6.  Given her pain assessment as noted, treatment options were discussed and the following options were decided upon as a follow-up plan to address the patient's pain: continuation of current treatment plan for pain.         Teressa Hammond, APRN  4/23/2021  10:23 CDT        Part of this note may be an electronic transcription/translation of spoken language to printed text using the Dragon Dictation System.          CC:

## 2021-04-23 NOTE — TELEPHONE ENCOUNTER
----- Message from BEBE Suero sent at 4/23/2021 10:29 AM CDT -----  Let her know iron labs are stable

## 2021-04-26 ENCOUNTER — TELEPHONE (OUTPATIENT)
Dept: PODIATRY | Facility: CLINIC | Age: 61
End: 2021-04-26

## 2021-04-26 NOTE — TELEPHONE ENCOUNTER
Patient is getting PICC line pulled and they are going to D/C patient for dressing change as well.

## 2021-04-26 NOTE — TELEPHONE ENCOUNTER
[facility] REQUESTS A CALL BACK RE NEEDS HOME CARE ORDERS FOR PT.  CALL BACK # [phone].  THANK YOU.

## 2021-04-28 ENCOUNTER — OFFICE VISIT (OUTPATIENT)
Dept: PODIATRY | Facility: CLINIC | Age: 61
End: 2021-04-28

## 2021-04-28 VITALS — BODY MASS INDEX: 43.19 KG/M2 | HEIGHT: 68 IN | HEART RATE: 80 BPM | WEIGHT: 285 LBS | OXYGEN SATURATION: 99 %

## 2021-04-28 DIAGNOSIS — M00.9 SEPTIC ARTHRITIS OF LEFT ANKLE, DUE TO UNSPECIFIED ORGANISM (HCC): Primary | ICD-10-CM

## 2021-04-28 DIAGNOSIS — M79.672 LEFT FOOT PAIN: ICD-10-CM

## 2021-04-28 DIAGNOSIS — E53.8 CYANOCOBALAMIN DEFICIENCY: ICD-10-CM

## 2021-04-28 DIAGNOSIS — M96.0 NONUNION OF SUBTALAR ARTHRODESIS: ICD-10-CM

## 2021-04-28 PROCEDURE — 99024 POSTOP FOLLOW-UP VISIT: CPT | Performed by: PODIATRIST

## 2021-04-28 NOTE — PROGRESS NOTES
Ariana Luis Martinez  1960  61 y.o. female   C. Ferguson, APRN 2/24/2021  BS - 123 per pt.    Patient returns to clinic for post op appointment left ankle.   04/28/2021    Chief Complaint   Patient presents with   • Left Ankle - Follow-up, Post-op       History of Present Illness    Ms Martinez is a 60 y/o diabetic female who previously underwent left ankle arthroscopy, exostectomy, hardware removal left foot.  Date of surgery 2/26/2021.  This procedure was complicated by postoperative infection patient developed a septic ankle.  She was subsequently admitted following her last office visit and treated with incision and drainage.  Patient was transferred to [facility] in [place] due to concern of possible bacterial vegetation.  She had a PICC line placed and has been on IV antibiotics since that time.  She has now completed her 6-week course of IV Unasyn and had her PICC line pulled.  Her CRP is back within normal limits.  She is remained nonweightbearing in a posterior splint.    Past Medical History:   Diagnosis Date   • Angina, class IV (CMS/HCC)    • Anxiety    • Anxiety and depression    • Arthritis    • Benign paroxysmal positional vertigo    • Bladder disorder     has bladder stimulator   • Carpal tunnel syndrome    • CHF (congestive heart failure) (CMS/HCC)    • Chronic pain    • Coronary atherosclerosis     hx CABG 2005.  is followed by Dr Kwon   • Depression    • Diabetes mellitus (CMS/HCC)     Type 2, controlled   • Diabetic polyneuropathy (CMS/HCC)    • Disease of thyroid gland    • Elevated cholesterol    • Female stress incontinence    • Foot pain, left    • Full dentures    • Gastroparesis    • GERD (gastroesophageal reflux disease)    • Hyperlipidemia    • Hypertension    • Low back pain    • Malaise and fatigue    • Multiple joint pain    • Obesity     Refuses to be weighed   • Otalgia     Both   • Perforation of tympanic membrane     Left   • Postoperative wound infection    • Vitamin D  deficiency    • Wears glasses     reading         Past Surgical History:   Procedure Laterality Date   • ABDOMINAL SURGERY     • ANGIOPLASTY      coronary   • ANKLE ARTHROSCOPY Left 2/26/2021    Procedure: Left foot hardware removal, ankle arthroscopy, bone grafting , left foot exostectomy;  Surgeon: Ignacio Lord DPM;  Location: [facility] OR;  Service: Podiatry;  Laterality: Left;   • BREAST BIOPSY Right    • CARDIAC CATHETERIZATION     • CARDIAC CATHETERIZATION N/A 6/20/2017    Procedure: Right Heart Cath;  Surgeon: Can Kwon MD PhD;  Location: [facility] CATH INVASIVE LOCATION;  Service:    • CARDIAC CATHETERIZATION N/A 2/18/2020    Procedure: Left Heart Cath;  Surgeon: Catalina Cooper MD;  Location: [facility] CATH INVASIVE LOCATION;  Service: Cardiology;  Laterality: N/A;   • CARPAL TUNNEL RELEASE     • CHOLECYSTECTOMY     • COLONOSCOPY N/A 6/24/2020    Procedure: COLONOSCOPY;  Surgeon: Julián Maldonado MD;  Location: [facility] ENDOSCOPY;  Service: Gastroenterology;  Laterality: N/A;   • CORONARY ARTERY BYPASS GRAFT  2005   • ENDOSCOPY N/A 10/19/2018    Procedure: ESOPHAGOGASTRODUODENOSCOPY possible dilation;  Surgeon: Julián Maldonado MD;  Location: [facility] ENDOSCOPY;  Service: Gastroenterology   • ENDOSCOPY N/A 6/24/2020    Procedure: ESOPHAGOGASTRODUODENOSCOPY WED appt please;  Surgeon: Julián Maldonado MD;  Location: [facility] ENDOSCOPY;  Service: Gastroenterology;  Laterality: N/A;   • ENDOSCOPY AND COLONOSCOPY     • FOOT SURGERY      Toes   • FOOT SURGERY     • GASTRIC BANDING      Revision, laparoscopic   • HYSTERECTOMY     • INCISION AND DRAINAGE LEG Left 3/12/2021    Procedure: Left ankle arthroscopic irrigation and debridement, screw removal;  Surgeon: Ignacio Lord DPM;  Location: [facility] OR;  Service: Podiatry;  Laterality: Left;   • MOUTH SURGERY     • SALPINGO OOPHORECTOMY     • SHOULDER SURGERY     • SUBTALAR ARTHRODESIS Left 1/16/2019    Procedure: LEFT FOOT HARDWARE REMOVAL, FIFTH  METATARSAL , OPEN REDUCTION INTERNAL FIXATION, CALCANEAL OSTEOTOMY;  Surgeon: Ignacio Lord DPM;  Location: [facility];  Service: Podiatry   • SUBTALAR ARTHRODESIS Left 10/16/2019    Procedure: foot hardware removal, subtalar joint fusion  possible de/reattachment of achilles tendon        (c-arm);  Surgeon: Ignacio Lord DPM;  Location: [facility];  Service: Podiatry   • SUBTALAR ARTHRODESIS Left 9/30/2020    Procedure: subtalar, talonavicular joint arthrodesis.  Removal hardware.          (c-arm);  Surgeon: Ignacio Lord DPM;  Location: [facility];  Service: Podiatry;  Laterality: Left;   • TRANSESOPHAGEAL ECHOCARDIOGRAM (LAMONTE)      With color flow         Family History   Problem Relation Age of Onset   • Diabetes Other    • Heart disease Other    • Hypertension Other    • Heart disease Mother    • Stroke Mother    • Hypertension Mother    • Diabetes Sister    • Heart disease Sister    • Hypertension Sister    • Heart disease Sister    • Diabetes Sister    • Hypertension Sister    • Diabetes Sister    • Diabetes Sister    • Diabetes Sister    • Diabetes Sister          Social History     Socioeconomic History   • Marital status:      Spouse name: Not on file   • Number of children: Not on file   • Years of education: Not on file   • Highest education level: Not on file   Tobacco Use   • Smoking status: Never Smoker   • Smokeless tobacco: Never Used   Vaping Use   • Vaping Use: Never used   Substance and Sexual Activity   • Alcohol use: No   • Drug use: No   • Sexual activity: Defer         Current Outpatient Medications   Medication Sig Dispense Refill   • ARIPiprazole (ABILIFY) 15 MG tablet Take 1 tablet by mouth Daily. 90 tablet 1   • aspirin (aspirin) 81 MG EC tablet Take 81 mg by mouth Daily.     • atorvastatin (LIPITOR) 80 MG tablet TAKE 1 TABLET BY MOUTH EVERY DAY 90 tablet 1   • BD SHARPS CONTAINER HOME misc 1 each Take As Directed. 1 each 0   • Blood Glucose Monitoring Suppl (ONE  TOUCH ULTRA MINI) w/Device kit USE AS DIRECTED TO CHECK BLOOD SUGAR 1 each 0   • Calcium Citrate-Vitamin D (CITRACAL/VITAMIN D) 250-200 MG-UNIT tablet Take 2 tablets by mouth 2 (two) times a day.     • clopidogrel (PLAVIX) 75 MG tablet Take 1 tablet by mouth Daily. 30 tablet 5   • cyanocobalamin 1000 MCG/ML injection INJECT 1 ML INTO THE APPROPRIATE MUSCLE AS DIRECTED BY PRESCRIBER EVERY 28 (TWENTY-EIGHT) DAYS. 3 mL 0   • folic acid (FOLVITE) 1 MG tablet Take 1 tablet by mouth Daily. 90 tablet 1   • furosemide (LASIX) 40 MG tablet TAKE 1 TABLET BY MOUTH EVERY DAY 90 tablet 1   • gabapentin (NEURONTIN) 400 MG capsule Take 1 capsule by mouth 3 (Three) Times a Day. 90 capsule 2   • GLUCAGON EMERGENCY 1 MG injection USE AS DIRECTED AS NEEDED 1 kit 0   • glucose blood (Accu-Chek Iris Plus) test strip USE TO CHECK BLOOD SUGAR 4 TIMES DAILY. E11.9 100 each 3   • glucose monitor monitoring kit 1 each Daily. accucheck iris meter, E11.9 1 each 0   • hydroCHLOROthiazide (HYDRODIURIL) 12.5 MG tablet Take 12.5 mg by mouth Daily.     • HYDROcodone-acetaminophen (NORCO) 7.5-325 MG per tablet Take 1 tablet by mouth Every 6 (Six) Hours As Needed for Moderate Pain . 120 tablet 0   • Insulin Glargine (BASAGLAR KWIKPEN) 100 UNIT/ML injection pen Inject 60 Units under the skin into the appropriate area as directed Every Night.     • Insulin Pen Needle (B-D ULTRAFINE III SHORT PEN) 31G X 8 MM misc USE TO INJECT 4 TIMES A  each 11   • lisinopril (PRINIVIL,ZESTRIL) 10 MG tablet Take 1 tablet by mouth Daily. 30 tablet 0   • LORazepam (ATIVAN) 0.5 MG tablet Take 1 tablet by mouth Daily As Needed for Anxiety. 30 tablet 0   • meclizine (ANTIVERT) 25 MG tablet Take 1 tablet by mouth 3 (Three) Times a Day As Needed for dizziness. 90 tablet 6   • metoclopramide (REGLAN) 10 MG tablet TAKE 1 TABLET BY MOUTH FOUR TIMES A DAY AS NEEDED 120 tablet 1   • metoprolol succinate XL (TOPROL-XL) 25 MG 24 hr tablet TAKE 1 TABLET BY MOUTH EVERY DAY 31  "tablet 6   • mirtazapine (REMERON) 45 MG tablet TAKE 1 TABLET BY MOUTH EVERY NIGHT. 90 tablet 0   • naloxone (NARCAN) 0.4 MG/ML injection Infuse 0.5 mL into a venous catheter Every 5 (Five) Minutes As Needed for Opioid Reversal.     • NOVOLOG FLEXPEN 100 UNIT/ML solution pen-injector sc pen INJECT 60 UNITS BEFORE MEALS 3 TIMES A  mL 3   • ondansetron (ZOFRAN) 8 MG tablet TAKE 1 TABLET BY MOUTH EVERY 8 (EIGHT) HOURS AS NEEDED FOR NAUSEA OR VOMITING. 18 tablet 1   • pantoprazole (PROTONIX) 20 MG EC tablet TAKE 1 TABLET BY MOUTH EVERY DAY 90 tablet 3   • Syringe/Needle, Disp, (Luer Lock Safety Syringes) 25G X 5/8\" 3 ML misc 1 each Daily. 1 Q28 DAYS 1 each 2   • traMADol (ULTRAM) 50 MG tablet Take 1 tablet by mouth Every 6 (Six) Hours As Needed for Moderate Pain  (pain). 30 tablet 0   • venlafaxine XR (EFFEXOR-XR) 150 MG 24 hr capsule Take 150 mg by mouth Every Morning.     • vitamin D (ERGOCALCIFEROL) 1.25 MG (60478 UT) capsule capsule TAKE ONE CAPSULE BY MOUTH EVERY SUNDAY. 12 capsule 2     No current facility-administered medications for this visit.     Review of Systems   Constitutional: Negative.    HENT: Negative.    Eyes: Negative.    Respiratory: Negative.    Cardiovascular: Negative.    Gastrointestinal: Negative.    Endocrine: Negative.    Genitourinary: Negative.    Musculoskeletal: Negative.    Skin: Negative.    Neurological: Positive for numbness.   Psychiatric/Behavioral: Negative.          OBJECTIVE    There were no vitals taken for this visit.      Physical Exam   Constitutional: she appears well-developed and well-nourished.   HEENT: Normocephalic. Atraumatic.  CV: No CP. RRR  Resp: Non-labored respirations.  Psychiatric: she has a normal mood and affect. her behavior is normal.           Lower Extremity Exam:  Pulses palpable.  Capillary fill time within normal limits.  Mild to moderate  left foot and ankle edema.    Minimal erythema  Subtalar, talonavicular joint rectus, rigid. Moderate " residual pes cavus.   Instability noted to left ankle joint  Plantar lateral midfoot ulceration now healed              Procedures         ASSESSMENT AND PLAN    Diagnoses and all orders for this visit:    1. Septic arthritis of left ankle, due to unspecified organism (CMS/HCC) (Primary)  -     XR Ankle 3+ View Left    2. Left foot pain  -     XR Foot 3+ View Left        -Patient seems to be progressing well with treatment of septic ankle.  Has completed IV Unasyn course and PICC line pulled.  She is set to see infectious disease again in 3 weeks for recheck of her labs.  -Discussed likely need for future surgical reconstruction including transition to TTC arthrodesis.  Discussed importance of maintaining her blood glucose.  Will wait until we recheck her labs and her follow-up visit with infectious disease in 3 weeks prior to further discussion of surgical timing.  -Placed in the tall cam boot today to assist with transfers.  Continue to avoid ambulation.  -Recheck 2 weeks          This document has been electronically signed by Dana Leslie MA on April 28, 2021 07:55 CDT

## 2021-04-29 ENCOUNTER — OFFICE VISIT (OUTPATIENT)
Dept: FAMILY MEDICINE CLINIC | Facility: CLINIC | Age: 61
End: 2021-04-29

## 2021-04-29 ENCOUNTER — LAB (OUTPATIENT)
Dept: LAB | Facility: OTHER | Age: 61
End: 2021-04-29

## 2021-04-29 ENCOUNTER — TELEPHONE (OUTPATIENT)
Dept: PODIATRY | Facility: CLINIC | Age: 61
End: 2021-04-29

## 2021-04-29 VITALS
HEART RATE: 88 BPM | HEIGHT: 68 IN | RESPIRATION RATE: 16 BRPM | BODY MASS INDEX: 43.19 KG/M2 | DIASTOLIC BLOOD PRESSURE: 86 MMHG | WEIGHT: 285 LBS | SYSTOLIC BLOOD PRESSURE: 138 MMHG | OXYGEN SATURATION: 99 %

## 2021-04-29 DIAGNOSIS — I25.118 CORONARY ARTERY DISEASE OF NATIVE ARTERY OF NATIVE HEART WITH STABLE ANGINA PECTORIS (HCC): ICD-10-CM

## 2021-04-29 DIAGNOSIS — M79.672 CHRONIC FOOT PAIN, LEFT: ICD-10-CM

## 2021-04-29 DIAGNOSIS — Z79.891 LONG TERM PRESCRIPTION OPIATE USE: Primary | ICD-10-CM

## 2021-04-29 DIAGNOSIS — F41.1 GENERALIZED ANXIETY DISORDER: ICD-10-CM

## 2021-04-29 DIAGNOSIS — M54.41 CHRONIC RIGHT-SIDED LOW BACK PAIN WITH RIGHT-SIDED SCIATICA: ICD-10-CM

## 2021-04-29 DIAGNOSIS — G89.29 CHRONIC RIGHT-SIDED LOW BACK PAIN WITH RIGHT-SIDED SCIATICA: ICD-10-CM

## 2021-04-29 DIAGNOSIS — Z79.891 LONG TERM PRESCRIPTION OPIATE USE: ICD-10-CM

## 2021-04-29 DIAGNOSIS — I10 ESSENTIAL HYPERTENSION: Primary | ICD-10-CM

## 2021-04-29 DIAGNOSIS — G89.29 CHRONIC FOOT PAIN, LEFT: ICD-10-CM

## 2021-04-29 DIAGNOSIS — G89.29 CHRONIC RIGHT-SIDED LOW BACK PAIN WITH RIGHT-SIDED SCIATICA: Chronic | ICD-10-CM

## 2021-04-29 DIAGNOSIS — G89.18 ACUTE POSTOPERATIVE PAIN OF LEFT FOOT: ICD-10-CM

## 2021-04-29 DIAGNOSIS — M54.41 CHRONIC RIGHT-SIDED LOW BACK PAIN WITH RIGHT-SIDED SCIATICA: Chronic | ICD-10-CM

## 2021-04-29 DIAGNOSIS — M79.672 ACUTE POSTOPERATIVE PAIN OF LEFT FOOT: ICD-10-CM

## 2021-04-29 PROCEDURE — 80307 DRUG TEST PRSMV CHEM ANLYZR: CPT | Performed by: NURSE PRACTITIONER

## 2021-04-29 PROCEDURE — 99214 OFFICE O/P EST MOD 30 MIN: CPT | Performed by: NURSE PRACTITIONER

## 2021-04-29 PROCEDURE — G0481 DRUG TEST DEF 8-14 CLASSES: HCPCS | Performed by: NURSE PRACTITIONER

## 2021-04-29 RX ORDER — HYDROCODONE BITARTRATE AND ACETAMINOPHEN 7.5; 325 MG/1; MG/1
1 TABLET ORAL EVERY 6 HOURS PRN
Qty: 120 TABLET | Refills: 0 | Status: CANCELLED | OUTPATIENT
Start: 2021-04-29

## 2021-04-29 RX ORDER — LISINOPRIL 20 MG/1
20 TABLET ORAL DAILY
Qty: 90 TABLET | Refills: 1 | Status: SHIPPED | OUTPATIENT
Start: 2021-04-29 | End: 2021-05-13 | Stop reason: DRUGHIGH

## 2021-04-29 RX ORDER — NITROGLYCERIN 0.4 MG/1
0.4 TABLET SUBLINGUAL
Qty: 20 TABLET | Refills: 11 | Status: SHIPPED | OUTPATIENT
Start: 2021-04-29 | End: 2022-04-26 | Stop reason: SDUPTHER

## 2021-04-29 NOTE — TELEPHONE ENCOUNTER
PT REQUESTS RICHAR GIVE HER A CALL BACK RE PT  HAS QUESTIONS THAT SHE FORGOT TO ASK YESTERDAY.  CALL BACK [phone].  THANK YOU.

## 2021-04-29 NOTE — TELEPHONE ENCOUNTER
Spoke with patient and let her know that it was okay to shower and wash her foot just make sure she dries it really good. Patient also asked if it was okay for her to drive. I advised patient to wait until she returns to clinic and discuss with Dr. Lord.

## 2021-04-29 NOTE — PROGRESS NOTES
Subjective   Ariana Luis Martinez is a 61 y.o. female.       Chief Complaint   Patient presents with   • Leg Pain     hosp f/u        History of Present Illness   Most recent UDS collected today results still pending.  Other labs collected ordered by BEBE Núñez show continued iron deficiency managed by Ms. Hammond, grossly normal CBC, normal vitamin B12 and folate over replaced.  Other labs are still pending.      Interval history since last visit: Ms Martinez is a 60 y/o diabetic female who previously underwent left ankle arthroscopy, exostectomy, hardware removal left foot.  Date of surgery 2/26/2021, Dr Lord, podiatry.  This procedure was complicated by postoperative infection patient developed a septic ankle.  She was subsequently admitted following her last office visit and treated with incision and drainage.  Patient was transferred to [facility] in [place] due to concern of possible bacterial vegetation.  She had a PICC line placed and has been on IV antibiotics since that time.  She has now completed her 6-week course of IV Unasyn and had her PICC line pulled.  Her CRP is back within normal limits.  She is remained nonweightbearing in a posterior splint.   Last visit with Dr Lord was yesterday, 4/28/21. Summary above from his notes.    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  Due to iron deficiency and inability to tolerate iron by mouth, patient received 2 dose of intravenous Feraheme on October 23, 2019 and October 30, 2019.    Recently seen in May and iron stores were  dropping again and patient was given 2 additional doses of IV iron on 6/4/2020 and 6/12/2020. She also had upper and lower endoscopies 6/24/2020 with no identifiable source of bleeding     She has labs resulted described above, and some still pending from Ms Hammond's orders. No other lab needs repeated at this time by review of records.      Chronic lower back pain secondary to osteoarthritic changes with degenerative disc disease of the lumbar spine with associated right-sided umbar radiculopathy.  Onset more than 1 year ago.  Gradually worsening over time but stable today.  Pain is managed with hydrocodone, and gabapentin for radiculopathy, with reports of good relief.  Compliant with use and no adverse effects of medication reported.  Not due for refill today.    Chronic anxiety, stable.  Managed with Abilify 50 mg p.o. daily, Remeron 45 mg p.o. nightly and as needed Ativan 0.5 mg daily if needed.  Reports emotions are well controlled with current medications.  Not due for refill today.    HTN: above goal in office today bp 138.86 hr 88. Current medications lisinopril 10mg daily, Toprol XL 25mg daily, HCTZ 12.5mg daily and Lasix 40mg daily. Reports home blood pressure checks BID and presents record showing uncontrolled BP with evening readings the worst at 147/71 (best) and 186/91 (worst) running average in 160s each evening past 2 weeks. Associated headache with each BP over 160. Compliant with current medications. occassional chest pains noted. Follows with Dr Kwon, soon to be seeing a different [facility] cardiologist as he is leaving our practice. Had ECHO in March 2021 at [facility] in [place] with suspected valvular vegetation which proved negative for this. Known CHF. Advised to call and schedule with cardiology for follow up due to recent occurrences of chest pain and need for monitoring of HR/ CAD. S/p CABG of 16 years ago, no AMI. Continues on plavix and low dose asa daily.     Complaints  today: None.    Parts of most recent relevant visit HPI, ROS  and PE may be carried forward and all are updated as appropriate for current situation.    Past Surgical History:   Procedure Laterality Date   • ABDOMINAL SURGERY     • ANGIOPLASTY      coronary   • ANKLE ARTHROSCOPY Left 2/26/2021    Procedure: Left foot hardware removal, ankle arthroscopy, bone grafting , left foot exostectomy;  Surgeon: Ignacio Lord DPM;  Location: [facility] OR;  Service: Podiatry;  Laterality: Left;   • BREAST BIOPSY Right    • CARDIAC CATHETERIZATION     • CARDIAC CATHETERIZATION N/A 6/20/2017    Procedure: Right Heart Cath;  Surgeon: Can Kwon MD PhD;  Location: [facility] CATH INVASIVE LOCATION;  Service:    • CARDIAC CATHETERIZATION N/A 2/18/2020    Procedure: Left Heart Cath;  Surgeon: Catalina Cooper MD;  Location: [facility] CATH INVASIVE LOCATION;  Service: Cardiology;  Laterality: N/A;   • CARPAL TUNNEL RELEASE     • CHOLECYSTECTOMY     • COLONOSCOPY N/A 6/24/2020    Procedure: COLONOSCOPY;  Surgeon: Julián Maldonado MD;  Location: [facility] ENDOSCOPY;  Service: Gastroenterology;  Laterality: N/A;   • CORONARY ARTERY BYPASS GRAFT  2005   • ENDOSCOPY N/A 10/19/2018    Procedure: ESOPHAGOGASTRODUODENOSCOPY possible dilation;  Surgeon: Julián Maldonado MD;  Location: [facility] ENDOSCOPY;  Service: Gastroenterology   • ENDOSCOPY N/A 6/24/2020    Procedure: ESOPHAGOGASTRODUODENOSCOPY WED appt please;  Surgeon: Julián Maldonado MD;  Location: [facility] ENDOSCOPY;  Service: Gastroenterology;  Laterality: N/A;   • ENDOSCOPY AND COLONOSCOPY     • FOOT SURGERY      Toes   • FOOT SURGERY     • GASTRIC BANDING      Revision, laparoscopic   • HYSTERECTOMY     • INCISION AND DRAINAGE LEG Left 3/12/2021    Procedure: Left ankle arthroscopic irrigation and debridement, screw removal;  Surgeon: Ignacio Lord DPM;  Location: [facility] OR;  Service: Podiatry;  Laterality: Left;   • MOUTH SURGERY     • SALPINGO OOPHORECTOMY     •  SHOULDER SURGERY     • SUBTALAR ARTHRODESIS Left 1/16/2019    Procedure: LEFT FOOT HARDWARE REMOVAL, FIFTH METATARSAL , OPEN REDUCTION INTERNAL FIXATION, CALCANEAL OSTEOTOMY;  Surgeon: Ignacio Lord DPM;  Location: [facility];  Service: Podiatry   • SUBTALAR ARTHRODESIS Left 10/16/2019    Procedure: foot hardware removal, subtalar joint fusion  possible de/reattachment of achilles tendon        (c-arm);  Surgeon: Ignacio Lord DPM;  Location: [facility];  Service: Podiatry   • SUBTALAR ARTHRODESIS Left 9/30/2020    Procedure: subtalar, talonavicular joint arthrodesis.  Removal hardware.          (c-arm);  Surgeon: Ignacio Lord DPM;  Location: [facility];  Service: Podiatry;  Laterality: Left;   • TRANSESOPHAGEAL ECHOCARDIOGRAM (LAMONTE)      With color flow      Social History     Socioeconomic History   • Marital status:      Spouse name: Not on file   • Number of children: Not on file   • Years of education: Not on file   • Highest education level: Not on file   Tobacco Use   • Smoking status: Never Smoker   • Smokeless tobacco: Never Used   Vaping Use   • Vaping Use: Never used   Substance and Sexual Activity   • Alcohol use: No   • Drug use: No   • Sexual activity: Defer      The following portions of the patient's history were reviewed and updated as appropriate: She  has a past medical history of Angina, class IV (CMS/HCC), Anxiety, Anxiety and depression, Arthritis, Benign paroxysmal positional vertigo, Bladder disorder, Carpal tunnel syndrome, CHF (congestive heart failure) (CMS/HCC), Chronic pain, Coronary atherosclerosis, Depression, Diabetes mellitus (CMS/HCC), Diabetic polyneuropathy (CMS/HCC), Disease of thyroid gland, Elevated cholesterol, Female stress incontinence, Foot pain, left, Full dentures, Gastroparesis, GERD (gastroesophageal reflux disease), Hyperlipidemia, Hypertension, Low back pain, Malaise and fatigue, Multiple joint pain, Obesity, Otalgia, Perforation of tympanic  membrane, Postoperative wound infection, Vitamin D deficiency, and Wears glasses..    Review of Systems   Constitutional: Negative.    HENT: Negative.  Negative for congestion.    Eyes: Negative.  Negative for blurred vision, double vision, photophobia and visual disturbance.   Respiratory: Negative.  Negative for cough, shortness of breath, wheezing and stridor.    Cardiovascular: Negative.  Negative for chest pain, palpitations and leg swelling.   Gastrointestinal: Negative.  Negative for abdominal distention, abdominal pain, blood in stool, constipation, diarrhea, nausea, vomiting, GERD and indigestion.   Endocrine: Negative.  Negative for cold intolerance and heat intolerance.   Genitourinary: Negative.  Negative for dysuria, flank pain, frequency and urinary incontinence.   Musculoskeletal: Positive for arthralgias and back pain.   Skin: Negative.    Allergic/Immunologic: Negative.  Negative for immunocompromised state.   Neurological: Negative.    Hematological: Negative.    Psychiatric/Behavioral: Negative.  Negative for agitation, behavioral problems, decreased concentration, dysphoric mood, hallucinations, self-injury, sleep disturbance, suicidal ideas, negative for hyperactivity, depressed mood and stress.   All other systems reviewed and are negative.    PHQ-9 Depression Screening  Little interest or pleasure in doing things?     Feeling down, depressed, or hopeless?     Trouble falling or staying asleep, or sleeping too much?     Feeling tired or having little energy?     Poor appetite or overeating?     Feeling bad about yourself - or that you are a failure or have let yourself or your family down?     Trouble concentrating on things, such as reading the newspaper or watching television?     Moving or speaking so slowly that other people could have noticed? Or the opposite - being so fidgety or restless that you have been moving around a lot more than usual?     Thoughts that you would be better off  "dead, or of hurting yourself in some way?     PHQ-9 Total Score     If you checked off any problems, how difficult have these problems made it for you to do your work, take care of things at home, or get along with other people?      Patient understands the risks associated with this controlled medication, including tolerance and addiction.  Patient also agrees to only obtain this medication from me, and not from a another provider, unless that provider is covering for me in my absence.  Patient also agrees to be compliant in dosing, and not self adjust the dose of medication.  A signed controlled substance agreement is on file, and the patient has received a controlled substance education sheet at this a previous visit.  The patient has also signed a consent for treatment with a controlled substance as per [facility] policy. LESTER was obtained.    Objective    Vitals:    04/29/21 1045   BP: 138/86   BP Location: Left arm   Patient Position: Sitting   Cuff Size: Adult   Pulse: 88   Resp: 16   SpO2: 99%   Weight: 129 kg (285 lb)   Height: 172.7 cm (67.99\")   PainSc:   6   PainLoc: Leg       Physical Exam  Vitals and nursing note reviewed.   Constitutional:       General: She is not in acute distress.     Appearance: Normal appearance. She is well-developed. She is obese. She is not ill-appearing or diaphoretic.   HENT:      Head: Normocephalic and atraumatic.   Eyes:      General: No scleral icterus.        Right eye: No discharge.         Left eye: No discharge.      Conjunctiva/sclera: Conjunctivae normal.      Pupils: Pupils are equal, round, and reactive to light.   Neck:      Thyroid: No thyromegaly.      Vascular: No carotid bruit or JVD.      Trachea: No tracheal deviation.   Cardiovascular:      Rate and Rhythm: Normal rate and regular rhythm.      Pulses: Normal pulses.      Heart sounds: Normal heart sounds. No murmur heard.   No friction rub. No gallop.    Pulmonary:      Effort: Pulmonary effort is " normal. No respiratory distress.      Breath sounds: Normal breath sounds. No stridor. No wheezing, rhonchi or rales.   Chest:      Chest wall: No tenderness.   Abdominal:      General: Bowel sounds are normal.      Palpations: Abdomen is soft.   Musculoskeletal:         General: No tenderness or deformity. Normal range of motion.      Cervical back: Normal range of motion and neck supple. No rigidity or tenderness.      Right lower leg: No edema.      Left lower leg: No edema.   Lymphadenopathy:      Cervical: No cervical adenopathy.   Skin:     General: Skin is warm and dry.      Capillary Refill: Capillary refill takes 2 to 3 seconds.      Coloration: Skin is not jaundiced or pale.      Findings: No bruising, erythema, lesion or rash.   Neurological:      General: No focal deficit present.      Mental Status: She is alert and oriented to person, place, and time. Mental status is at baseline.      Motor: No weakness.      Gait: Gait normal.   Psychiatric:         Mood and Affect: Mood normal.         Behavior: Behavior normal.         Thought Content: Thought content normal.         Judgment: Judgment normal.           Assessment/Plan   Diagnoses and all orders for this visit:    1. Essential hypertension (Primary)  -     lisinopril (PRINIVIL,ZESTRIL) 20 MG tablet; Take 1 tablet by mouth Daily.  Dispense: 90 tablet; Refill: 1    2. Chronic right-sided low back pain with right-sided sciatica  Comments:  controlled with Norco    3. Generalized anxiety disorder    4. Chronic right-sided low back pain with right-sided sciatica    5. Chronic foot pain, left    6. Acute postoperative pain of left foot    7. Coronary artery disease of native artery of native heart with stable angina pectoris (CMS/[facility])  -     nitroglycerin (NITROSTAT) 0.4 MG SL tablet; Place 1 tablet under the tongue Every 5 (Five) Minutes As Needed for Chest Pain. Take no more than 3 doses in 15 minutes.  Dispense: 20 tablet; Refill: 11    Other  orders  -     Cancel: HYDROcodone-acetaminophen (NORCO) 7.5-325 MG per tablet; Take 1 tablet by mouth Every 6 (Six) Hours As Needed for Moderate Pain .  Dispense: 120 tablet; Refill: 0          Return in about 2 weeks (around 5/13/2021) for for HTN follow up after increase of lisinopril to 20mg daily..           This document has been electronically signed by BEBE Palomino on April 29, 2021 11:32 CDT

## 2021-05-04 DIAGNOSIS — F33.1 DEPRESSION, MAJOR, RECURRENT, MODERATE (HCC): ICD-10-CM

## 2021-05-04 RX ORDER — ARIPIPRAZOLE 15 MG/1
TABLET ORAL
Qty: 90 TABLET | Refills: 1 | Status: SHIPPED | OUTPATIENT
Start: 2021-05-04 | End: 2021-10-22

## 2021-05-05 ENCOUNTER — IMMUNIZATION (OUTPATIENT)
Dept: VACCINE CLINIC | Facility: HOSPITAL | Age: 61
End: 2021-05-05

## 2021-05-05 ENCOUNTER — TRANSCRIBE ORDERS (OUTPATIENT)
Dept: LAB | Facility: HOSPITAL | Age: 61
End: 2021-05-05

## 2021-05-05 DIAGNOSIS — L73.9 STAPHYLOCOCCUS AUREUS SUPERFICIAL FOLLICULITIS: ICD-10-CM

## 2021-05-05 DIAGNOSIS — B95.61 STAPHYLOCOCCUS AUREUS SUPERFICIAL FOLLICULITIS: ICD-10-CM

## 2021-05-05 DIAGNOSIS — R78.81 BACTEREMIA: Primary | ICD-10-CM

## 2021-05-05 LAB
6MAM UR QL CFM: NEGATIVE
6MAM/CREAT UR: NOT DETECTED NG/MG CREAT
7AMINOCLONAZEPAM/CREAT UR: NOT DETECTED NG/MG CREAT
A-OH ALPRAZ/CREAT UR: NOT DETECTED NG/MG CREAT
A-OH-TRIAZOLAM/CREAT UR CFM: NOT DETECTED NG/MG CREAT
ALFENTANIL/CREAT UR CFM: NOT DETECTED NG/MG CREAT
ALPHA-HYDROXYMIDAZOLAM, URINE: NOT DETECTED NG/MG CREAT
ALPRAZ/CREAT UR CFM: NOT DETECTED NG/MG CREAT
AMOBARBITAL UR QL CFM: NOT DETECTED
AMPHET/CREAT UR: NOT DETECTED NG/MG CREAT
AMPHETAMINES UR QL CFM: NEGATIVE
BARBITAL UR QL CFM: NOT DETECTED
BARBITURATES UR QL CFM: NEGATIVE
BENZODIAZ UR QL CFM: NORMAL
BUPRENORPHINE UR QL CFM: NEGATIVE
BUPRENORPHINE/CREAT UR: NOT DETECTED NG/MG CREAT
BUTABARBITAL UR QL CFM: NOT DETECTED
BUTALBITAL UR QL CFM: NOT DETECTED
BZE/CREAT UR: NOT DETECTED NG/MG CREAT
CANNABINOIDS UR QL CFM: NEGATIVE
CARBOXYTHC/CREAT UR: NOT DETECTED NG/MG CREAT
CLONAZEPAM/CREAT UR CFM: NOT DETECTED NG/MG CREAT
COCAETHYLENE/CREAT UR CFM: NOT DETECTED NG/MG CREAT
COCAINE UR QL CFM: NEGATIVE
COCAINE/CREAT UR CFM: NOT DETECTED NG/MG CREAT
CODEINE/CREAT UR: NOT DETECTED NG/MG CREAT
CREAT UR-MCNC: 119 MG/DL
DESALKYLFLURAZ/CREAT UR: NOT DETECTED NG/MG CREAT
DESMETHYLFLUNITRAZEPAM: NOT DETECTED NG/MG CREAT
DHC/CREAT UR: 116 NG/MG CREAT
DIAZEPAM/CREAT UR: NOT DETECTED NG/MG CREAT
DRUGS UR: NORMAL
EDDP/CREAT UR: NOT DETECTED NG/MG CREAT
ETHANOL UR CFM-MCNC: 0.39 G/DL
ETHANOL UR QL CFM: NORMAL
FENTANYL UR QL CFM: NEGATIVE
FENTANYL/CREAT UR: NOT DETECTED NG/MG CREAT
FLUNITRAZEPAM UR QL CFM: NOT DETECTED NG/MG CREAT
HYDROCODONE/CREAT UR: 2007 NG/MG CREAT
HYDROMORPHONE/CREAT UR: 207 NG/MG CREAT
LEVEL OF DETECTION:: NORMAL
LORAZEPAM/CREAT UR: 262 NG/MG CREAT
MDA/CREAT UR: NOT DETECTED NG/MG CREAT
MDMA/CREAT UR: NOT DETECTED NG/MG CREAT
MEPHOBARBITAL UR QL CFM: NOT DETECTED
METHADONE UR QL CFM: NEGATIVE
METHADONE/CREAT UR: NOT DETECTED NG/MG CREAT
METHAMPHET/CREAT UR: NOT DETECTED NG/MG CREAT
MIDAZOLAM/CREAT UR CFM: NOT DETECTED NG/MG CREAT
MORPHINE/CREAT UR: NOT DETECTED NG/MG CREAT
N-NORTRAMADOL/CREAT UR CFM: NOT DETECTED NG/MG CREAT
NARCOTICS UR: NEGATIVE
NORBUPRENORPHINE/CREAT UR: NOT DETECTED NG/MG CREAT
NORCODEINE/CREAT UR CFM: NOT DETECTED NG/MG CREAT
NORDIAZEPAM/CREAT UR: NOT DETECTED NG/MG CREAT
NORFENTANYL/CREAT UR: NOT DETECTED NG/MG CREAT
NORHYDROCODONE/CREAT UR: 1613 NG/MG CREAT
NORMORPHINE UR-MCNC: NOT DETECTED NG/MG CREAT
NOROXYCODONE/CREAT UR: NOT DETECTED NG/MG CREAT
NOROXYMORPHONE/CREAT UR CFM: NOT DETECTED NG/MG CREAT
O-NORTRAMADOL UR CFM-MCNC: NOT DETECTED NG/MG CREAT
OPIATES UR QL CFM: NORMAL
OXAZEPAM/CREAT UR: NOT DETECTED NG/MG CREAT
OXYCODONE UR QL CFM: NEGATIVE
OXYCODONE/CREAT UR: NOT DETECTED NG/MG CREAT
OXYMORPHONE/CREAT UR: NOT DETECTED NG/MG CREAT
PENTOBARB UR QL CFM: NOT DETECTED
PHENOBARB UR QL CFM: NOT DETECTED
SECOBARBITAL UR QL CFM: NOT DETECTED
SUFENTANIL/CREAT UR CFM: NOT DETECTED NG/MG CREAT
TAPENTADOL UR QL CFM: NEGATIVE
TAPENTADOL/CREAT UR: NOT DETECTED NG/MG CREAT
TEMAZEPAM/CREAT UR: NOT DETECTED NG/MG CREAT
THIOPENTAL UR QL CFM: NOT DETECTED
TRAMADOL UR QL CFM: NOT DETECTED NG/MG CREAT

## 2021-05-05 PROCEDURE — 91300 HC SARSCOV02 VAC 30MCG/0.3ML IM: CPT | Performed by: THORACIC SURGERY (CARDIOTHORACIC VASCULAR SURGERY)

## 2021-05-05 PROCEDURE — 0002A: CPT | Performed by: THORACIC SURGERY (CARDIOTHORACIC VASCULAR SURGERY)

## 2021-05-05 RX ORDER — CYANOCOBALAMIN 1000 UG/ML
1000 INJECTION, SOLUTION INTRAMUSCULAR; SUBCUTANEOUS
Qty: 3 ML | Refills: 0 | Status: SHIPPED | OUTPATIENT
Start: 2021-05-05 | End: 2021-07-19 | Stop reason: SDUPTHER

## 2021-05-06 ENCOUNTER — TRANSCRIBE ORDERS (OUTPATIENT)
Dept: LAB | Facility: HOSPITAL | Age: 61
End: 2021-05-06

## 2021-05-06 DIAGNOSIS — B95.61 STAPHYLOCOCCUS AUREUS SUPERFICIAL FOLLICULITIS: ICD-10-CM

## 2021-05-06 DIAGNOSIS — L73.9 STAPHYLOCOCCUS AUREUS SUPERFICIAL FOLLICULITIS: ICD-10-CM

## 2021-05-06 DIAGNOSIS — R78.81 BACTEREMIA: Primary | ICD-10-CM

## 2021-05-09 DIAGNOSIS — F41.1 GENERALIZED ANXIETY DISORDER: ICD-10-CM

## 2021-05-10 ENCOUNTER — TELEPHONE (OUTPATIENT)
Dept: FAMILY MEDICINE CLINIC | Facility: CLINIC | Age: 61
End: 2021-05-10

## 2021-05-10 PROBLEM — L08.9 LEFT FOOT INFECTION: Status: ACTIVE | Noted: 2021-03-14

## 2021-05-10 PROBLEM — I38 ENDOCARDITIS: Status: ACTIVE | Noted: 2021-03-14

## 2021-05-10 RX ORDER — LORAZEPAM 0.5 MG/1
TABLET ORAL
Qty: 30 TABLET | Refills: 0 | Status: SHIPPED | OUTPATIENT
Start: 2021-05-10 | End: 2021-05-13 | Stop reason: SDUPTHER

## 2021-05-10 NOTE — TELEPHONE ENCOUNTER
Patient seen in office every 3 months for chronic anxiety requiring benzodiazepine anxiolytic. Compliant with medication, visits with no adverse effects noted. LESTER and UDS current and appropriate. Patient called requesting scheduled refill at appropriate interval. Patient understands the risks associated with this controlled medication, including tolerance and addiction.  Patient also agrees to only obtain this medication from me, and not from a another provider, unless that provider is covering for me in my absence.  Patient also agrees to be compliant in dosing, and not self adjust the dose of medication.  A signed controlled substance agreement is on file, and the patient has received a controlled substance education sheet at this a previous visit.  The patient has also signed a consent for treatment with a controlled substance as per [facility] policy. LESTER was obtained. Refill sent for lorazepam.     This document has been electronically signed by BEBE Palomino on May 10, 2021 15:30 CDT

## 2021-05-10 NOTE — TELEPHONE ENCOUNTER
Patient seen in office every 3 months for chronic anxiety requiring benzodiazepine anxiolytic. Compliant with medication, visits with no adverse effects noted. LESTER and UDS current and appropriate. Patient called requesting scheduled refill at appropriate interval. Patient understands the risks associated with this controlled medication, including tolerance and addiction.  Patient also agrees to only obtain this medication from me, and not from a another provider, unless that provider is covering for me in my absence.  Patient also agrees to be compliant in dosing, and not self adjust the dose of medication.  A signed controlled substance agreement is on file, and the patient has received a controlled substance education sheet at this a previous visit.  The patient has also signed a consent for treatment with a controlled substance as per [facility] policy. LESTER was obtained. Refill sent for lorazepam.     This document has been electronically signed by BEBE Palomino on May 10, 2021 16:33 CDT

## 2021-05-11 ENCOUNTER — OFFICE VISIT (OUTPATIENT)
Dept: PODIATRY | Facility: CLINIC | Age: 61
End: 2021-05-11

## 2021-05-11 VITALS — WEIGHT: 285 LBS | HEIGHT: 68 IN | OXYGEN SATURATION: 96 % | BODY MASS INDEX: 43.19 KG/M2 | HEART RATE: 81 BPM

## 2021-05-11 DIAGNOSIS — M86.9 OSTEOMYELITIS OF ANKLE AND FOOT (HCC): ICD-10-CM

## 2021-05-11 DIAGNOSIS — Q66.70 PES CAVUS: ICD-10-CM

## 2021-05-11 DIAGNOSIS — M00.9 SEPTIC ARTHRITIS OF LEFT ANKLE, DUE TO UNSPECIFIED ORGANISM (HCC): Primary | ICD-10-CM

## 2021-05-11 DIAGNOSIS — M96.0 NONUNION OF SUBTALAR ARTHRODESIS: ICD-10-CM

## 2021-05-11 PROCEDURE — 99024 POSTOP FOLLOW-UP VISIT: CPT | Performed by: PODIATRIST

## 2021-05-13 DIAGNOSIS — G89.29 CHRONIC FOOT PAIN, LEFT: ICD-10-CM

## 2021-05-13 DIAGNOSIS — G89.18 ACUTE POSTOPERATIVE PAIN OF LEFT FOOT: ICD-10-CM

## 2021-05-13 DIAGNOSIS — M79.672 ACUTE POSTOPERATIVE PAIN OF LEFT FOOT: ICD-10-CM

## 2021-05-13 DIAGNOSIS — M79.672 CHRONIC FOOT PAIN, LEFT: ICD-10-CM

## 2021-05-13 DIAGNOSIS — G89.29 CHRONIC RIGHT-SIDED LOW BACK PAIN WITH RIGHT-SIDED SCIATICA: ICD-10-CM

## 2021-05-13 DIAGNOSIS — M54.41 CHRONIC RIGHT-SIDED LOW BACK PAIN WITH RIGHT-SIDED SCIATICA: ICD-10-CM

## 2021-05-13 DIAGNOSIS — I10 ESSENTIAL HYPERTENSION: Primary | ICD-10-CM

## 2021-05-13 DIAGNOSIS — F41.1 GENERALIZED ANXIETY DISORDER: ICD-10-CM

## 2021-05-13 RX ORDER — LISINOPRIL 30 MG/1
30 TABLET ORAL DAILY
Qty: 30 TABLET | Refills: 5 | Status: SHIPPED | OUTPATIENT
Start: 2021-05-13 | End: 2021-06-01 | Stop reason: DRUGHIGH

## 2021-05-14 ENCOUNTER — TELEPHONE (OUTPATIENT)
Dept: FAMILY MEDICINE CLINIC | Facility: CLINIC | Age: 61
End: 2021-05-14

## 2021-05-14 RX ORDER — HYDROCODONE BITARTRATE AND ACETAMINOPHEN 7.5; 325 MG/1; MG/1
1 TABLET ORAL EVERY 6 HOURS PRN
Qty: 120 TABLET | Refills: 0 | Status: SHIPPED | OUTPATIENT
Start: 2021-05-14 | End: 2021-06-24 | Stop reason: SDUPTHER

## 2021-05-14 RX ORDER — LORAZEPAM 0.5 MG/1
0.5 TABLET ORAL DAILY PRN
Qty: 30 TABLET | Refills: 0 | Status: SHIPPED | OUTPATIENT
Start: 2021-05-14 | End: 2021-06-24 | Stop reason: SDUPTHER

## 2021-05-14 NOTE — TELEPHONE ENCOUNTER
Patient has chronic anxiety requiring benzodiazepine use concurrently with chronic pain requiring opiate pain medication and/ or gabapentin. Patient has been educated regarding potential dangers of oversedation and accidental overdose with use of both medications concurrently. Serial assessments of patient has yielded no sign of oversedation or adverse effects of patient, and he/she is advised and aware to notify my office immediately if symptoms do occur, as well as to discontinue use of benzodiazepine medication and opiate medication immediately if that occurs, pending medical evaluation and advice. Patient has been compliant with use of medications, UDS, visits with no adverse effects noted. Patient understands the risks associated with this controlled medication, including tolerance and addiction.  Patient also agrees to only obtain this medication from me, and not from a another provider, unless that provider is covering for me in my absence.  Patient also agrees to be compliant in dosing, and not self adjust the dose of medication.  A signed controlled substance agreement is on file, and the patient has received a controlled substance education sheet at this a previous visit.  The patient has also signed a consent for treatment with a controlled substance as per [facility] policy. LESTER was obtained. Refills were sent for: Hydrocodone and lorazepam.     This document has been electronically signed by BEBE Palomino on May 14, 2021 12:20 CDT

## 2021-05-17 ENCOUNTER — APPOINTMENT (OUTPATIENT)
Dept: INTERVENTIONAL RADIOLOGY/VASCULAR | Facility: HOSPITAL | Age: 61
End: 2021-05-17

## 2021-05-17 ENCOUNTER — APPOINTMENT (OUTPATIENT)
Dept: ULTRASOUND IMAGING | Facility: HOSPITAL | Age: 61
End: 2021-05-17

## 2021-05-17 ENCOUNTER — APPOINTMENT (OUTPATIENT)
Dept: NUCLEAR MEDICINE | Facility: HOSPITAL | Age: 61
End: 2021-05-17

## 2021-05-17 ENCOUNTER — HOSPITAL ENCOUNTER (OUTPATIENT)
Facility: HOSPITAL | Age: 61
Setting detail: OBSERVATION
Discharge: HOME | End: 2021-05-18
Attending: EMERGENCY MEDICINE | Admitting: INTERNAL MEDICINE

## 2021-05-17 ENCOUNTER — APPOINTMENT (OUTPATIENT)
Dept: GENERAL RADIOLOGY | Facility: HOSPITAL | Age: 61
End: 2021-05-17

## 2021-05-17 ENCOUNTER — APPOINTMENT (OUTPATIENT)
Dept: CARDIOLOGY | Facility: HOSPITAL | Age: 61
End: 2021-05-17

## 2021-05-17 ENCOUNTER — APPOINTMENT (OUTPATIENT)
Dept: CT IMAGING | Facility: HOSPITAL | Age: 61
End: 2021-05-17

## 2021-05-17 DIAGNOSIS — R07.9 CHEST PAIN, UNSPECIFIED TYPE: Primary | ICD-10-CM

## 2021-05-17 PROBLEM — I10 UNCONTROLLED HYPERTENSION: Status: ACTIVE | Noted: 2021-05-17

## 2021-05-17 LAB
ALBUMIN SERPL-MCNC: 4.1 G/DL (ref 3.5–5.2)
ALBUMIN/GLOB SERPL: 1.5 G/DL
ALP SERPL-CCNC: 160 U/L (ref 39–117)
ALT SERPL W P-5'-P-CCNC: 34 U/L (ref 1–33)
ANION GAP SERPL CALCULATED.3IONS-SCNC: 6 MMOL/L (ref 5–15)
AST SERPL-CCNC: 23 U/L (ref 1–32)
BASOPHILS # BLD AUTO: 0.05 10*3/MM3 (ref 0–0.2)
BASOPHILS NFR BLD AUTO: 0.5 % (ref 0–1.5)
BH CV ECHO MEAS - BSA(HAYCOCK): 2.4 M^2
BH CV ECHO MEAS - BSA: 2.3 M^2
BH CV ECHO MEAS - BZI_BMI: 39.4 KILOGRAMS/M^2
BH CV ECHO MEAS - BZI_METRIC_HEIGHT: 172.7 CM
BH CV ECHO MEAS - BZI_METRIC_WEIGHT: 117.5 KG
BH CV ECHO MEAS - EDV(CUBED): 76.2 ML
BH CV ECHO MEAS - EDV(MOD-SP4): 99.6 ML
BH CV ECHO MEAS - EDV(TEICH): 80.4 ML
BH CV ECHO MEAS - EF(CUBED): 66 %
BH CV ECHO MEAS - EF(MOD-SP4): 82.3 %
BH CV ECHO MEAS - EF(TEICH): 57.9 %
BH CV ECHO MEAS - ESV(CUBED): 25.9 ML
BH CV ECHO MEAS - ESV(MOD-SP4): 17.6 ML
BH CV ECHO MEAS - ESV(TEICH): 33.9 ML
BH CV ECHO MEAS - FS: 30.2 %
BH CV ECHO MEAS - IVS/LVPW: 0.88
BH CV ECHO MEAS - IVSD: 1.1 CM
BH CV ECHO MEAS - LV DIASTOLIC VOL/BSA (35-75): 43.7 ML/M^2
BH CV ECHO MEAS - LV MASS(C)D: 178.8 GRAMS
BH CV ECHO MEAS - LV MASS(C)DI: 78.4 GRAMS/M^2
BH CV ECHO MEAS - LV SYSTOLIC VOL/BSA (12-30): 7.7 ML/M^2
BH CV ECHO MEAS - LVIDD: 4.2 CM
BH CV ECHO MEAS - LVIDS: 3 CM
BH CV ECHO MEAS - LVLD AP4: 8.3 CM
BH CV ECHO MEAS - LVLS AP4: 6.7 CM
BH CV ECHO MEAS - LVPWD: 1.3 CM
BH CV ECHO MEAS - MR MAX PG: 73.3 MMHG
BH CV ECHO MEAS - MR MAX VEL: 428 CM/SEC
BH CV ECHO MEAS - MV A MAX VEL: 113 CM/SEC
BH CV ECHO MEAS - MV DEC SLOPE: 600 CM/SEC^2
BH CV ECHO MEAS - MV E MAX VEL: 103 CM/SEC
BH CV ECHO MEAS - MV E/A: 0.91
BH CV ECHO MEAS - MV P1/2T MAX VEL: 116 CM/SEC
BH CV ECHO MEAS - MV P1/2T: 56.6 MSEC
BH CV ECHO MEAS - MVA P1/2T LCG: 1.9 CM^2
BH CV ECHO MEAS - MVA(P1/2T): 3.9 CM^2
BH CV ECHO MEAS - RVDD: 3.3 CM
BH CV ECHO MEAS - SI(CUBED): 22 ML/M^2
BH CV ECHO MEAS - SI(MOD-SP4): 35.9 ML/M^2
BH CV ECHO MEAS - SI(TEICH): 20.4 ML/M^2
BH CV ECHO MEAS - SV(CUBED): 50.3 ML
BH CV ECHO MEAS - SV(MOD-SP4): 82 ML
BH CV ECHO MEAS - SV(TEICH): 46.5 ML
BH CV ECHO MEAS - TR MAX VEL: 225 CM/SEC
BILIRUB SERPL-MCNC: 0.3 MG/DL (ref 0–1.2)
BUN SERPL-MCNC: 11 MG/DL (ref 6–20)
BUN/CREAT SERPL: 13.6 (ref 7–25)
CALCIUM SPEC-SCNC: 9.4 MG/DL (ref 8.6–10.5)
CHLORIDE SERPL-SCNC: 97 MMOL/L (ref 98–107)
CO2 SERPL-SCNC: 31 MMOL/L (ref 22–29)
CREAT SERPL-MCNC: 0.81 MG/DL (ref 0.57–1)
D-DIMER, QUANTITATIVE (MAD,POW, STR): 1719 NG/ML (FEU) (ref 0–470)
DEPRECATED RDW RBC AUTO: 42.2 FL (ref 37–54)
EOSINOPHIL # BLD AUTO: 0.4 10*3/MM3 (ref 0–0.4)
EOSINOPHIL NFR BLD AUTO: 3.7 % (ref 0.3–6.2)
ERYTHROCYTE [DISTWIDTH] IN BLOOD BY AUTOMATED COUNT: 13.4 % (ref 12.3–15.4)
FLUAV RNA RESP QL NAA+PROBE: NOT DETECTED
FLUBV RNA RESP QL NAA+PROBE: NOT DETECTED
GFR SERPL CREATININE-BSD FRML MDRD: 72 ML/MIN/1.73
GLOBULIN UR ELPH-MCNC: 2.8 GM/DL
GLUCOSE BLDC GLUCOMTR-MCNC: 315 MG/DL (ref 70–130)
GLUCOSE SERPL-MCNC: 176 MG/DL (ref 65–99)
HCT VFR BLD AUTO: 37.6 % (ref 34–46.6)
HGB BLD-MCNC: 12.1 G/DL (ref 12–15.9)
HOLD SPECIMEN: NORMAL
IMM GRANULOCYTES # BLD AUTO: 0.05 10*3/MM3 (ref 0–0.05)
IMM GRANULOCYTES NFR BLD AUTO: 0.5 % (ref 0–0.5)
LV EF 2D ECHO EST: 67 %
LYMPHOCYTES # BLD AUTO: 2.77 10*3/MM3 (ref 0.7–3.1)
LYMPHOCYTES NFR BLD AUTO: 25.5 % (ref 19.6–45.3)
MAGNESIUM SERPL-MCNC: 1.7 MG/DL (ref 1.6–2.6)
MAXIMAL PREDICTED HEART RATE: 161 BPM
MCH RBC QN AUTO: 28 PG (ref 26.6–33)
MCHC RBC AUTO-ENTMCNC: 32.2 G/DL (ref 31.5–35.7)
MCV RBC AUTO: 87 FL (ref 79–97)
MONOCYTES # BLD AUTO: 0.87 10*3/MM3 (ref 0.1–0.9)
MONOCYTES NFR BLD AUTO: 8 % (ref 5–12)
NEUTROPHILS NFR BLD AUTO: 6.73 10*3/MM3 (ref 1.7–7)
NEUTROPHILS NFR BLD AUTO: 61.8 % (ref 42.7–76)
NRBC BLD AUTO-RTO: 0 /100 WBC (ref 0–0.2)
NT-PROBNP SERPL-MCNC: 624.6 PG/ML (ref 0–900)
PLATELET # BLD AUTO: 472 10*3/MM3 (ref 140–450)
PMV BLD AUTO: 9.4 FL (ref 6–12)
POTASSIUM SERPL-SCNC: 3.7 MMOL/L (ref 3.5–5.2)
PROT SERPL-MCNC: 6.9 G/DL (ref 6–8.5)
RBC # BLD AUTO: 4.32 10*6/MM3 (ref 3.77–5.28)
SARS-COV-2 RNA RESP QL NAA+PROBE: NOT DETECTED
SODIUM SERPL-SCNC: 134 MMOL/L (ref 136–145)
STRESS TARGET HR: 137 BPM
TROPONIN T SERPL-MCNC: <0.01 NG/ML (ref 0–0.03)
TROPONIN T SERPL-MCNC: <0.01 NG/ML (ref 0–0.03)
WBC # BLD AUTO: 10.87 10*3/MM3 (ref 3.4–10.8)

## 2021-05-17 PROCEDURE — 93005 ELECTROCARDIOGRAM TRACING: CPT | Performed by: EMERGENCY MEDICINE

## 2021-05-17 PROCEDURE — 96375 TX/PRO/DX INJ NEW DRUG ADDON: CPT

## 2021-05-17 PROCEDURE — 93017 CV STRESS TEST TRACING ONLY: CPT

## 2021-05-17 PROCEDURE — 83880 ASSAY OF NATRIURETIC PEPTIDE: CPT | Performed by: EMERGENCY MEDICINE

## 2021-05-17 PROCEDURE — 78452 HT MUSCLE IMAGE SPECT MULT: CPT

## 2021-05-17 PROCEDURE — 93306 TTE W/DOPPLER COMPLETE: CPT

## 2021-05-17 PROCEDURE — G0378 HOSPITAL OBSERVATION PER HR: HCPCS

## 2021-05-17 PROCEDURE — 0 TECHNETIUM SESTAMIBI: Performed by: FAMILY MEDICINE

## 2021-05-17 PROCEDURE — 25010000002 ENOXAPARIN PER 10 MG: Performed by: INTERNAL MEDICINE

## 2021-05-17 PROCEDURE — 87636 SARSCOV2 & INF A&B AMP PRB: CPT | Performed by: EMERGENCY MEDICINE

## 2021-05-17 PROCEDURE — 85379 FIBRIN DEGRADATION QUANT: CPT | Performed by: EMERGENCY MEDICINE

## 2021-05-17 PROCEDURE — 71045 X-RAY EXAM CHEST 1 VIEW: CPT

## 2021-05-17 PROCEDURE — 96376 TX/PRO/DX INJ SAME DRUG ADON: CPT

## 2021-05-17 PROCEDURE — 25010000002 KETOROLAC TROMETHAMINE PER 15 MG: Performed by: FAMILY MEDICINE

## 2021-05-17 PROCEDURE — 25010000002 ONDANSETRON PER 1 MG: Performed by: INTERNAL MEDICINE

## 2021-05-17 PROCEDURE — 83735 ASSAY OF MAGNESIUM: CPT | Performed by: INTERNAL MEDICINE

## 2021-05-17 PROCEDURE — 93321 DOPPLER ECHO F-UP/LMTD STD: CPT | Performed by: INTERNAL MEDICINE

## 2021-05-17 PROCEDURE — 93325 DOPPLER ECHO COLOR FLOW MAPG: CPT

## 2021-05-17 PROCEDURE — 78452 HT MUSCLE IMAGE SPECT MULT: CPT | Performed by: INTERNAL MEDICINE

## 2021-05-17 PROCEDURE — C1751 CATH, INF, PER/CENT/MIDLINE: HCPCS

## 2021-05-17 PROCEDURE — 25010000002 PROCHLORPERAZINE 10 MG/2ML SOLUTION: Performed by: INTERNAL MEDICINE

## 2021-05-17 PROCEDURE — 99285 EMERGENCY DEPT VISIT HI MDM: CPT

## 2021-05-17 PROCEDURE — 85025 COMPLETE CBC W/AUTO DIFF WBC: CPT | Performed by: EMERGENCY MEDICINE

## 2021-05-17 PROCEDURE — 96372 THER/PROPH/DIAG INJ SC/IM: CPT

## 2021-05-17 PROCEDURE — 93308 TTE F-UP OR LMTD: CPT

## 2021-05-17 PROCEDURE — 63710000001 INSULIN ASPART PER 5 UNITS: Performed by: INTERNAL MEDICINE

## 2021-05-17 PROCEDURE — 71275 CT ANGIOGRAPHY CHEST: CPT

## 2021-05-17 PROCEDURE — 84484 ASSAY OF TROPONIN QUANT: CPT | Performed by: INTERNAL MEDICINE

## 2021-05-17 PROCEDURE — 84484 ASSAY OF TROPONIN QUANT: CPT | Performed by: EMERGENCY MEDICINE

## 2021-05-17 PROCEDURE — C9803 HOPD COVID-19 SPEC COLLECT: HCPCS

## 2021-05-17 PROCEDURE — 0 IOPAMIDOL PER 1 ML: Performed by: EMERGENCY MEDICINE

## 2021-05-17 PROCEDURE — 82962 GLUCOSE BLOOD TEST: CPT

## 2021-05-17 PROCEDURE — 93321 DOPPLER ECHO F-UP/LMTD STD: CPT

## 2021-05-17 PROCEDURE — 96374 THER/PROPH/DIAG INJ IV PUSH: CPT

## 2021-05-17 PROCEDURE — A9500 TC99M SESTAMIBI: HCPCS | Performed by: FAMILY MEDICINE

## 2021-05-17 PROCEDURE — 93018 CV STRESS TEST I&R ONLY: CPT | Performed by: INTERNAL MEDICINE

## 2021-05-17 PROCEDURE — 93016 CV STRESS TEST SUPVJ ONLY: CPT | Performed by: INTERNAL MEDICINE

## 2021-05-17 PROCEDURE — 25010000002 PERFLUTREN (DEFINITY) 8.476 MG IN SODIUM CHLORIDE (PF) 0.9 % 10 ML INJECTION: Performed by: FAMILY MEDICINE

## 2021-05-17 PROCEDURE — 36410 VNPNXR 3YR/> PHY/QHP DX/THER: CPT

## 2021-05-17 PROCEDURE — 80053 COMPREHEN METABOLIC PANEL: CPT | Performed by: EMERGENCY MEDICINE

## 2021-05-17 PROCEDURE — 63710000001 INSULIN DETEMIR PER 5 UNITS: Performed by: INTERNAL MEDICINE

## 2021-05-17 PROCEDURE — 93325 DOPPLER ECHO COLOR FLOW MAPG: CPT | Performed by: INTERNAL MEDICINE

## 2021-05-17 PROCEDURE — 76937 US GUIDE VASCULAR ACCESS: CPT

## 2021-05-17 PROCEDURE — 25010000002 ONDANSETRON PER 1 MG: Performed by: EMERGENCY MEDICINE

## 2021-05-17 PROCEDURE — 93010 ELECTROCARDIOGRAM REPORT: CPT | Performed by: INTERNAL MEDICINE

## 2021-05-17 PROCEDURE — 36415 COLL VENOUS BLD VENIPUNCTURE: CPT | Performed by: INTERNAL MEDICINE

## 2021-05-17 PROCEDURE — 93308 TTE F-UP OR LMTD: CPT | Performed by: INTERNAL MEDICINE

## 2021-05-17 RX ORDER — HYDROCHLOROTHIAZIDE 12.5 MG/1
12.5 TABLET ORAL DAILY
Status: DISCONTINUED | OUTPATIENT
Start: 2021-05-17 | End: 2021-05-18 | Stop reason: HOSPADM

## 2021-05-17 RX ORDER — MORPHINE SULFATE 2 MG/ML
2 INJECTION, SOLUTION INTRAMUSCULAR; INTRAVENOUS EVERY 4 HOURS PRN
Status: DISCONTINUED | OUTPATIENT
Start: 2021-05-17 | End: 2021-05-18 | Stop reason: HOSPADM

## 2021-05-17 RX ORDER — ASPIRIN 81 MG/1
81 TABLET ORAL DAILY
Status: DISCONTINUED | OUTPATIENT
Start: 2021-05-17 | End: 2021-05-18 | Stop reason: HOSPADM

## 2021-05-17 RX ORDER — MIRTAZAPINE 15 MG/1
45 TABLET, FILM COATED ORAL NIGHTLY
Status: DISCONTINUED | OUTPATIENT
Start: 2021-05-17 | End: 2021-05-18 | Stop reason: HOSPADM

## 2021-05-17 RX ORDER — METOCLOPRAMIDE 10 MG/1
10 TABLET ORAL
Status: DISCONTINUED | OUTPATIENT
Start: 2021-05-17 | End: 2021-05-18 | Stop reason: HOSPADM

## 2021-05-17 RX ORDER — KETOROLAC TROMETHAMINE 30 MG/ML
30 INJECTION, SOLUTION INTRAMUSCULAR; INTRAVENOUS EVERY 6 HOURS PRN
Status: DISCONTINUED | OUTPATIENT
Start: 2021-05-17 | End: 2021-05-17

## 2021-05-17 RX ORDER — ARIPIPRAZOLE 15 MG/1
15 TABLET ORAL DAILY
Status: DISCONTINUED | OUTPATIENT
Start: 2021-05-17 | End: 2021-05-18 | Stop reason: HOSPADM

## 2021-05-17 RX ORDER — FUROSEMIDE 40 MG/1
40 TABLET ORAL DAILY
Status: DISCONTINUED | OUTPATIENT
Start: 2021-05-17 | End: 2021-05-18 | Stop reason: HOSPADM

## 2021-05-17 RX ORDER — NICOTINE POLACRILEX 4 MG
15 LOZENGE BUCCAL
Status: DISCONTINUED | OUTPATIENT
Start: 2021-05-17 | End: 2021-05-18 | Stop reason: HOSPADM

## 2021-05-17 RX ORDER — PROCHLORPERAZINE EDISYLATE 5 MG/ML
5 INJECTION INTRAMUSCULAR; INTRAVENOUS EVERY 6 HOURS PRN
Status: DISCONTINUED | OUTPATIENT
Start: 2021-05-17 | End: 2021-05-18 | Stop reason: HOSPADM

## 2021-05-17 RX ORDER — PANTOPRAZOLE SODIUM 20 MG/1
20 TABLET, DELAYED RELEASE ORAL DAILY
Status: DISCONTINUED | OUTPATIENT
Start: 2021-05-17 | End: 2021-05-18 | Stop reason: HOSPADM

## 2021-05-17 RX ORDER — FOLIC ACID 1 MG/1
1 TABLET ORAL DAILY
Status: DISCONTINUED | OUTPATIENT
Start: 2021-05-17 | End: 2021-05-18 | Stop reason: HOSPADM

## 2021-05-17 RX ORDER — LORAZEPAM 0.5 MG/1
0.5 TABLET ORAL DAILY PRN
Status: DISCONTINUED | OUTPATIENT
Start: 2021-05-17 | End: 2021-05-18 | Stop reason: HOSPADM

## 2021-05-17 RX ORDER — GABAPENTIN 400 MG/1
400 CAPSULE ORAL 3 TIMES DAILY
Status: DISCONTINUED | OUTPATIENT
Start: 2021-05-17 | End: 2021-05-18 | Stop reason: HOSPADM

## 2021-05-17 RX ORDER — BUTALBITAL, ACETAMINOPHEN AND CAFFEINE 50; 325; 40 MG/1; MG/1; MG/1
2 TABLET ORAL EVERY 6 HOURS PRN
Status: DISCONTINUED | OUTPATIENT
Start: 2021-05-17 | End: 2021-05-18 | Stop reason: HOSPADM

## 2021-05-17 RX ORDER — SODIUM CHLORIDE 0.9 % (FLUSH) 0.9 %
10 SYRINGE (ML) INJECTION AS NEEDED
Status: DISCONTINUED | OUTPATIENT
Start: 2021-05-17 | End: 2021-05-18 | Stop reason: HOSPADM

## 2021-05-17 RX ORDER — ATORVASTATIN CALCIUM 40 MG/1
80 TABLET, FILM COATED ORAL DAILY
Status: DISCONTINUED | OUTPATIENT
Start: 2021-05-17 | End: 2021-05-18 | Stop reason: HOSPADM

## 2021-05-17 RX ORDER — CLOPIDOGREL BISULFATE 75 MG/1
75 TABLET ORAL DAILY
Status: DISCONTINUED | OUTPATIENT
Start: 2021-05-17 | End: 2021-05-18 | Stop reason: HOSPADM

## 2021-05-17 RX ORDER — ASPIRIN 325 MG
325 TABLET ORAL ONCE
Status: COMPLETED | OUTPATIENT
Start: 2021-05-17 | End: 2021-05-17

## 2021-05-17 RX ORDER — ACETAMINOPHEN 325 MG/1
650 TABLET ORAL EVERY 4 HOURS PRN
Status: DISCONTINUED | OUTPATIENT
Start: 2021-05-17 | End: 2021-05-18 | Stop reason: HOSPADM

## 2021-05-17 RX ORDER — SODIUM CHLORIDE 0.9 % (FLUSH) 0.9 %
10 SYRINGE (ML) INJECTION EVERY 12 HOURS SCHEDULED
Status: DISCONTINUED | OUTPATIENT
Start: 2021-05-17 | End: 2021-05-18 | Stop reason: HOSPADM

## 2021-05-17 RX ORDER — HYDROCODONE BITARTRATE AND ACETAMINOPHEN 7.5; 325 MG/1; MG/1
1 TABLET ORAL EVERY 6 HOURS PRN
Status: DISCONTINUED | OUTPATIENT
Start: 2021-05-17 | End: 2021-05-18 | Stop reason: HOSPADM

## 2021-05-17 RX ORDER — MECLIZINE HYDROCHLORIDE 25 MG/1
25 TABLET ORAL 3 TIMES DAILY PRN
Status: DISCONTINUED | OUTPATIENT
Start: 2021-05-17 | End: 2021-05-18 | Stop reason: HOSPADM

## 2021-05-17 RX ORDER — HYDRALAZINE HYDROCHLORIDE 20 MG/ML
20 INJECTION INTRAMUSCULAR; INTRAVENOUS EVERY 6 HOURS PRN
Status: DISCONTINUED | OUTPATIENT
Start: 2021-05-17 | End: 2021-05-18 | Stop reason: HOSPADM

## 2021-05-17 RX ORDER — METOCLOPRAMIDE 10 MG/1
TABLET ORAL
Qty: 120 TABLET | Refills: 1 | Status: SHIPPED | OUTPATIENT
Start: 2021-05-17 | End: 2021-07-19 | Stop reason: SDUPTHER

## 2021-05-17 RX ORDER — DEXTROSE MONOHYDRATE 25 G/50ML
25 INJECTION, SOLUTION INTRAVENOUS
Status: DISCONTINUED | OUTPATIENT
Start: 2021-05-17 | End: 2021-05-18 | Stop reason: HOSPADM

## 2021-05-17 RX ORDER — ONDANSETRON 2 MG/ML
4 INJECTION INTRAMUSCULAR; INTRAVENOUS EVERY 6 HOURS PRN
Status: DISCONTINUED | OUTPATIENT
Start: 2021-05-17 | End: 2021-05-18 | Stop reason: HOSPADM

## 2021-05-17 RX ORDER — ONDANSETRON 2 MG/ML
4 INJECTION INTRAMUSCULAR; INTRAVENOUS ONCE
Status: COMPLETED | OUTPATIENT
Start: 2021-05-17 | End: 2021-05-17

## 2021-05-17 RX ORDER — KETOROLAC TROMETHAMINE 15 MG/ML
15 INJECTION, SOLUTION INTRAMUSCULAR; INTRAVENOUS ONCE
Status: COMPLETED | OUTPATIENT
Start: 2021-05-17 | End: 2021-05-17

## 2021-05-17 RX ORDER — VENLAFAXINE HYDROCHLORIDE 75 MG/1
150 CAPSULE, EXTENDED RELEASE ORAL EVERY MORNING
Status: DISCONTINUED | OUTPATIENT
Start: 2021-05-18 | End: 2021-05-18 | Stop reason: HOSPADM

## 2021-05-17 RX ORDER — KETOROLAC TROMETHAMINE 30 MG/ML
30 INJECTION, SOLUTION INTRAMUSCULAR; INTRAVENOUS EVERY 6 HOURS PRN
Status: DISCONTINUED | OUTPATIENT
Start: 2021-05-17 | End: 2021-05-18 | Stop reason: HOSPADM

## 2021-05-17 RX ORDER — ACETAMINOPHEN 500 MG
1000 TABLET ORAL ONCE
Status: COMPLETED | OUTPATIENT
Start: 2021-05-17 | End: 2021-05-17

## 2021-05-17 RX ORDER — METOPROLOL SUCCINATE 25 MG/1
25 TABLET, EXTENDED RELEASE ORAL DAILY
Status: DISCONTINUED | OUTPATIENT
Start: 2021-05-17 | End: 2021-05-18 | Stop reason: HOSPADM

## 2021-05-17 RX ORDER — NITROGLYCERIN 0.4 MG/1
0.4 TABLET SUBLINGUAL
Status: DISCONTINUED | OUTPATIENT
Start: 2021-05-17 | End: 2021-05-18 | Stop reason: HOSPADM

## 2021-05-17 RX ADMIN — SODIUM CHLORIDE, PRESERVATIVE FREE 10 ML: 5 INJECTION INTRAVENOUS at 20:35

## 2021-05-17 RX ADMIN — FUROSEMIDE 40 MG: 40 TABLET ORAL at 16:23

## 2021-05-17 RX ADMIN — ACETAMINOPHEN 650 MG: 325 TABLET, FILM COATED ORAL at 17:51

## 2021-05-17 RX ADMIN — ONDANSETRON 4 MG: 2 INJECTION INTRAMUSCULAR; INTRAVENOUS at 20:31

## 2021-05-17 RX ADMIN — INSULIN ASPART 6 UNITS: 100 INJECTION, SOLUTION INTRAVENOUS; SUBCUTANEOUS at 17:51

## 2021-05-17 RX ADMIN — TECHNETIUM TC 99M SESTAMIBI 1 DOSE: 1 INJECTION INTRAVENOUS at 13:48

## 2021-05-17 RX ADMIN — HYDROCODONE BITARTRATE AND ACETAMINOPHEN 1 TABLET: 7.5; 325 TABLET ORAL at 20:31

## 2021-05-17 RX ADMIN — Medication 1 TABLET: at 16:23

## 2021-05-17 RX ADMIN — LISINOPRIL 30 MG: 20 TABLET ORAL at 16:22

## 2021-05-17 RX ADMIN — PROCHLORPERAZINE EDISYLATE 5 MG: 5 INJECTION INTRAMUSCULAR; INTRAVENOUS at 13:35

## 2021-05-17 RX ADMIN — GABAPENTIN 400 MG: 400 CAPSULE ORAL at 20:31

## 2021-05-17 RX ADMIN — IOPAMIDOL 69 ML: 755 INJECTION, SOLUTION INTRAVENOUS at 06:17

## 2021-05-17 RX ADMIN — ACETAMINOPHEN 1000 MG: 500 TABLET ORAL at 04:46

## 2021-05-17 RX ADMIN — ATORVASTATIN CALCIUM 80 MG: 40 TABLET, FILM COATED ORAL at 16:22

## 2021-05-17 RX ADMIN — ONDANSETRON HYDROCHLORIDE 4 MG: 2 INJECTION, SOLUTION INTRAMUSCULAR; INTRAVENOUS at 06:59

## 2021-05-17 RX ADMIN — ASPIRIN 81 MG: 81 TABLET, FILM COATED ORAL at 16:23

## 2021-05-17 RX ADMIN — METOPROLOL SUCCINATE 25 MG: 25 TABLET, EXTENDED RELEASE ORAL at 16:23

## 2021-05-17 RX ADMIN — HYDROCHLOROTHIAZIDE 12.5 MG: 12.5 TABLET ORAL at 16:23

## 2021-05-17 RX ADMIN — ASPIRIN 325 MG: 325 TABLET, COATED ORAL at 04:46

## 2021-05-17 RX ADMIN — GABAPENTIN 400 MG: 400 CAPSULE ORAL at 16:23

## 2021-05-17 RX ADMIN — NITROGLYCERIN 0.4 MG: 0.4 TABLET SUBLINGUAL at 04:44

## 2021-05-17 RX ADMIN — PANTOPRAZOLE SODIUM 20 MG: 20 TABLET, DELAYED RELEASE ORAL at 16:23

## 2021-05-17 RX ADMIN — FOLIC ACID 1 MG: 1 TABLET ORAL at 16:23

## 2021-05-17 RX ADMIN — BUTALBITAL, ACETAMINOPHEN, AND CAFFEINE 2 TABLET: 50; 325; 40 TABLET ORAL at 10:33

## 2021-05-17 RX ADMIN — KETOROLAC TROMETHAMINE 15 MG: 15 INJECTION, SOLUTION INTRAMUSCULAR; INTRAVENOUS at 08:15

## 2021-05-17 RX ADMIN — ARIPIPRAZOLE 15 MG: 15 TABLET ORAL at 17:51

## 2021-05-17 RX ADMIN — INSULIN DETEMIR 30 UNITS: 100 INJECTION, SOLUTION SUBCUTANEOUS at 17:51

## 2021-05-17 RX ADMIN — ENOXAPARIN SODIUM 40 MG: 40 INJECTION SUBCUTANEOUS at 17:51

## 2021-05-17 RX ADMIN — PERFLUTREN 1.5 ML: 6.52 INJECTION, SUSPENSION INTRAVENOUS at 14:22

## 2021-05-17 RX ADMIN — CLOPIDOGREL BISULFATE 75 MG: 75 TABLET ORAL at 16:23

## 2021-05-17 RX ADMIN — MIRTAZAPINE 45 MG: 15 TABLET, FILM COATED ORAL at 20:31

## 2021-05-18 ENCOUNTER — READMISSION MANAGEMENT (OUTPATIENT)
Dept: CALL CENTER | Facility: HOSPITAL | Age: 61
End: 2021-05-18

## 2021-05-18 ENCOUNTER — APPOINTMENT (OUTPATIENT)
Dept: CARDIOLOGY | Facility: HOSPITAL | Age: 61
End: 2021-05-18

## 2021-05-18 ENCOUNTER — APPOINTMENT (OUTPATIENT)
Dept: NUCLEAR MEDICINE | Facility: HOSPITAL | Age: 61
End: 2021-05-18

## 2021-05-18 VITALS
BODY MASS INDEX: 39.1 KG/M2 | TEMPERATURE: 96.5 F | HEART RATE: 68 BPM | WEIGHT: 258 LBS | HEIGHT: 68 IN | OXYGEN SATURATION: 97 % | SYSTOLIC BLOOD PRESSURE: 142 MMHG | DIASTOLIC BLOOD PRESSURE: 62 MMHG | RESPIRATION RATE: 16 BRPM

## 2021-05-18 LAB
ANION GAP SERPL CALCULATED.3IONS-SCNC: 7 MMOL/L (ref 5–15)
BASOPHILS # BLD AUTO: 0.05 10*3/MM3 (ref 0–0.2)
BASOPHILS NFR BLD AUTO: 0.6 % (ref 0–1.5)
BH CV REST NUCLEAR ISOTOPE DOSE: 25.7 MCI
BH CV STRESS BP STAGE 1: NORMAL
BH CV STRESS COMMENTS STAGE 1: NORMAL
BH CV STRESS DOSE REGADENOSON STAGE 1: 0.4
BH CV STRESS DURATION MIN STAGE 1: 0
BH CV STRESS DURATION SEC STAGE 1: 10
BH CV STRESS HR STAGE 1: 69
BH CV STRESS NUCLEAR ISOTOPE DOSE: 36 MCI
BH CV STRESS PROTOCOL 1: NORMAL
BH CV STRESS RECOVERY BP: NORMAL MMHG
BH CV STRESS RECOVERY HR: 67 BPM
BH CV STRESS STAGE 1: 1
BUN SERPL-MCNC: 11 MG/DL (ref 6–20)
BUN/CREAT SERPL: 12.4 (ref 7–25)
CALCIUM SPEC-SCNC: 9.6 MG/DL (ref 8.6–10.5)
CHLORIDE SERPL-SCNC: 100 MMOL/L (ref 98–107)
CO2 SERPL-SCNC: 30 MMOL/L (ref 22–29)
CREAT SERPL-MCNC: 0.89 MG/DL (ref 0.57–1)
DEPRECATED RDW RBC AUTO: 44.9 FL (ref 37–54)
EOSINOPHIL # BLD AUTO: 0.32 10*3/MM3 (ref 0–0.4)
EOSINOPHIL NFR BLD AUTO: 3.9 % (ref 0.3–6.2)
ERYTHROCYTE [DISTWIDTH] IN BLOOD BY AUTOMATED COUNT: 13.9 % (ref 12.3–15.4)
GFR SERPL CREATININE-BSD FRML MDRD: 65 ML/MIN/1.73
GLUCOSE BLDC GLUCOMTR-MCNC: 245 MG/DL (ref 70–130)
GLUCOSE BLDC GLUCOMTR-MCNC: 260 MG/DL (ref 70–130)
GLUCOSE BLDC GLUCOMTR-MCNC: 274 MG/DL (ref 70–130)
GLUCOSE BLDC GLUCOMTR-MCNC: 360 MG/DL (ref 70–130)
GLUCOSE SERPL-MCNC: 282 MG/DL (ref 65–99)
HCT VFR BLD AUTO: 40.2 % (ref 34–46.6)
HGB BLD-MCNC: 13.2 G/DL (ref 12–15.9)
IMM GRANULOCYTES # BLD AUTO: 0.04 10*3/MM3 (ref 0–0.05)
IMM GRANULOCYTES NFR BLD AUTO: 0.5 % (ref 0–0.5)
LV EF NUC BP: 60 %
LYMPHOCYTES # BLD AUTO: 2.75 10*3/MM3 (ref 0.7–3.1)
LYMPHOCYTES NFR BLD AUTO: 33.2 % (ref 19.6–45.3)
MAXIMAL PREDICTED HEART RATE: 161 BPM
MCH RBC QN AUTO: 29.5 PG (ref 26.6–33)
MCHC RBC AUTO-ENTMCNC: 32.8 G/DL (ref 31.5–35.7)
MCV RBC AUTO: 89.9 FL (ref 79–97)
MONOCYTES # BLD AUTO: 0.58 10*3/MM3 (ref 0.1–0.9)
MONOCYTES NFR BLD AUTO: 7 % (ref 5–12)
NEUTROPHILS NFR BLD AUTO: 4.54 10*3/MM3 (ref 1.7–7)
NEUTROPHILS NFR BLD AUTO: 54.8 % (ref 42.7–76)
NRBC BLD AUTO-RTO: 0 /100 WBC (ref 0–0.2)
PERCENT MAX PREDICTED HR: 46.58 %
PLATELET # BLD AUTO: 413 10*3/MM3 (ref 140–450)
PMV BLD AUTO: 9.5 FL (ref 6–12)
POTASSIUM SERPL-SCNC: 3.7 MMOL/L (ref 3.5–5.2)
RBC # BLD AUTO: 4.47 10*6/MM3 (ref 3.77–5.28)
SODIUM SERPL-SCNC: 137 MMOL/L (ref 136–145)
STRESS BASELINE BP: NORMAL MMHG
STRESS BASELINE HR: 66 BPM
STRESS PERCENT HR: 55 %
STRESS POST ESTIMATED WORKLOAD: 1 METS
STRESS POST PEAK BP: NORMAL MMHG
STRESS POST PEAK HR: 75 BPM
STRESS TARGET HR: 137 BPM
WBC # BLD AUTO: 8.28 10*3/MM3 (ref 3.4–10.8)

## 2021-05-18 PROCEDURE — 0 TECHNETIUM SESTAMIBI: Performed by: INTERNAL MEDICINE

## 2021-05-18 PROCEDURE — 82962 GLUCOSE BLOOD TEST: CPT

## 2021-05-18 PROCEDURE — G0378 HOSPITAL OBSERVATION PER HR: HCPCS

## 2021-05-18 PROCEDURE — 63710000001 INSULIN DETEMIR PER 5 UNITS: Performed by: INTERNAL MEDICINE

## 2021-05-18 PROCEDURE — 85025 COMPLETE CBC W/AUTO DIFF WBC: CPT | Performed by: INTERNAL MEDICINE

## 2021-05-18 PROCEDURE — A9500 TC99M SESTAMIBI: HCPCS | Performed by: INTERNAL MEDICINE

## 2021-05-18 PROCEDURE — 25010000002 ENOXAPARIN PER 10 MG: Performed by: INTERNAL MEDICINE

## 2021-05-18 PROCEDURE — 96376 TX/PRO/DX INJ SAME DRUG ADON: CPT

## 2021-05-18 PROCEDURE — 94760 N-INVAS EAR/PLS OXIMETRY 1: CPT

## 2021-05-18 PROCEDURE — 80048 BASIC METABOLIC PNL TOTAL CA: CPT | Performed by: INTERNAL MEDICINE

## 2021-05-18 PROCEDURE — 25010000002 ONDANSETRON PER 1 MG: Performed by: INTERNAL MEDICINE

## 2021-05-18 PROCEDURE — 96372 THER/PROPH/DIAG INJ SC/IM: CPT

## 2021-05-18 PROCEDURE — 63710000001 INSULIN ASPART PER 5 UNITS: Performed by: INTERNAL MEDICINE

## 2021-05-18 PROCEDURE — 94799 UNLISTED PULMONARY SVC/PX: CPT

## 2021-05-18 PROCEDURE — 25010000002 REGADENOSON 0.4 MG/5ML SOLUTION: Performed by: INTERNAL MEDICINE

## 2021-05-18 RX ORDER — SODIUM CHLORIDE 0.9 % (FLUSH) 0.9 %
10 SYRINGE (ML) INJECTION ONCE
Status: COMPLETED | OUTPATIENT
Start: 2021-05-18 | End: 2021-05-18

## 2021-05-18 RX ORDER — HYDROCHLOROTHIAZIDE 25 MG/1
25 TABLET ORAL DAILY
Qty: 90 TABLET | Refills: 0 | Status: SHIPPED | OUTPATIENT
Start: 2021-05-18 | End: 2021-06-24 | Stop reason: ALTCHOICE

## 2021-05-18 RX ADMIN — REGADENOSON 0.4 MG: 0.08 INJECTION, SOLUTION INTRAVENOUS at 09:42

## 2021-05-18 RX ADMIN — INSULIN DETEMIR 30 UNITS: 100 INJECTION, SOLUTION SUBCUTANEOUS at 10:09

## 2021-05-18 RX ADMIN — INSULIN ASPART 8 UNITS: 100 INJECTION, SOLUTION INTRAVENOUS; SUBCUTANEOUS at 17:30

## 2021-05-18 RX ADMIN — ASPIRIN 81 MG: 81 TABLET, FILM COATED ORAL at 10:04

## 2021-05-18 RX ADMIN — GABAPENTIN 400 MG: 400 CAPSULE ORAL at 15:23

## 2021-05-18 RX ADMIN — FUROSEMIDE 40 MG: 40 TABLET ORAL at 10:05

## 2021-05-18 RX ADMIN — ARIPIPRAZOLE 15 MG: 15 TABLET ORAL at 10:09

## 2021-05-18 RX ADMIN — ONDANSETRON 4 MG: 2 INJECTION INTRAMUSCULAR; INTRAVENOUS at 15:23

## 2021-05-18 RX ADMIN — METOPROLOL SUCCINATE 25 MG: 25 TABLET, EXTENDED RELEASE ORAL at 10:05

## 2021-05-18 RX ADMIN — ACETAMINOPHEN 650 MG: 325 TABLET, FILM COATED ORAL at 11:36

## 2021-05-18 RX ADMIN — ONDANSETRON 4 MG: 2 INJECTION INTRAMUSCULAR; INTRAVENOUS at 06:40

## 2021-05-18 RX ADMIN — ENOXAPARIN SODIUM 40 MG: 40 INJECTION SUBCUTANEOUS at 15:23

## 2021-05-18 RX ADMIN — INSULIN ASPART 6 UNITS: 100 INJECTION, SOLUTION INTRAVENOUS; SUBCUTANEOUS at 10:13

## 2021-05-18 RX ADMIN — BUTALBITAL, ACETAMINOPHEN, AND CAFFEINE 2 TABLET: 50; 325; 40 TABLET ORAL at 11:39

## 2021-05-18 RX ADMIN — CLOPIDOGREL BISULFATE 75 MG: 75 TABLET ORAL at 10:04

## 2021-05-18 RX ADMIN — TECHNETIUM TC 99M SESTAMIBI 1 DOSE: 1 INJECTION INTRAVENOUS at 09:43

## 2021-05-18 RX ADMIN — Medication 1 TABLET: at 10:04

## 2021-05-18 RX ADMIN — ACETAMINOPHEN 650 MG: 325 TABLET, FILM COATED ORAL at 06:39

## 2021-05-18 RX ADMIN — ATORVASTATIN CALCIUM 80 MG: 40 TABLET, FILM COATED ORAL at 10:05

## 2021-05-18 RX ADMIN — GABAPENTIN 400 MG: 400 CAPSULE ORAL at 10:05

## 2021-05-18 RX ADMIN — FOLIC ACID 1 MG: 1 TABLET ORAL at 10:04

## 2021-05-18 RX ADMIN — HYDROCHLOROTHIAZIDE 12.5 MG: 12.5 TABLET ORAL at 10:04

## 2021-05-18 RX ADMIN — VENLAFAXINE HYDROCHLORIDE 150 MG: 75 CAPSULE, EXTENDED RELEASE ORAL at 06:37

## 2021-05-18 RX ADMIN — PANTOPRAZOLE SODIUM 20 MG: 20 TABLET, DELAYED RELEASE ORAL at 10:04

## 2021-05-18 RX ADMIN — SODIUM CHLORIDE, PRESERVATIVE FREE 10 ML: 5 INJECTION INTRAVENOUS at 09:43

## 2021-05-18 RX ADMIN — LISINOPRIL 30 MG: 20 TABLET ORAL at 10:05

## 2021-05-19 ENCOUNTER — TRANSITIONAL CARE MANAGEMENT TELEPHONE ENCOUNTER (OUTPATIENT)
Dept: CALL CENTER | Facility: HOSPITAL | Age: 61
End: 2021-05-19

## 2021-05-19 NOTE — OUTREACH NOTE
Prep Survey      Responses   [facility] patient discharged from?  [place]   Is LACE score < 7 ?  No   Emergency Room discharge w/ pulse ox?  No   Eligibility  [facility]   Date of Admission  05/17/21   Date of Discharge  05/18/21   Discharge Disposition  Home or Self Care   Discharge diagnosis  chest pain and htn resolved   Does the patient have one of the following disease processes/diagnoses(primary or secondary)?  Other   Does the patient have Home health ordered?  No   Is there a DME ordered?  No   Prep survey completed?  Yes          Sherie Fernandez RN

## 2021-05-19 NOTE — OUTREACH NOTE
Call Center TCM Note      Responses   [facility] patient discharged from?  [place]   Does the patient have one of the following disease processes/diagnoses(primary or secondary)?  Other   TCM attempt successful?  No   Unsuccessful attempts  Attempt 1 [patient and spouse]          Yolanda Thomas RN    5/19/2021, 16:17 EDT

## 2021-05-19 NOTE — OUTREACH NOTE
Call Center TCM Note      Responses   [facility] patient discharged from?  [place]   Does the patient have one of the following disease processes/diagnoses(primary or secondary)?  Other   TCM attempt successful?  No   Unsuccessful attempts  Attempt 2          Yolanda Thomas RN    5/19/2021, 16:28 EDT

## 2021-05-20 ENCOUNTER — TRANSITIONAL CARE MANAGEMENT TELEPHONE ENCOUNTER (OUTPATIENT)
Dept: CALL CENTER | Facility: HOSPITAL | Age: 61
End: 2021-05-20

## 2021-05-20 LAB
QT INTERVAL: 410 MS
QTC INTERVAL: 472 MS

## 2021-05-20 NOTE — OUTREACH NOTE
Call Center TCM Note      Responses   [facility] [place] patient discharged from?  [place]   Does the patient have one of the following disease processes/diagnoses(primary or secondary)?  Other   TCM attempt successful?  Yes   Call start time  0916   Call end time  0919   Discharge diagnosis  chest pain and htn resolved   Meds reviewed with patient/caregiver?  Yes   Is the patient having any side effects they believe may be caused by any medication additions or changes?  No   Does the patient have all medications ordered at discharge?  Yes   Is the patient taking all medications as directed (includes completed medication regime)?  Yes   Comments regarding appointments  Appt with ID on 5/27/21   Does the patient have a primary care provider?   Yes   Does the patient have an appointment with their PCP within 7 days of discharge?  Yes   Comments regarding PCP  Hospital d/c f/u appt is on 5/24/21 at 11:30 am    Has the patient kept scheduled appointments due by today?  N/A   Psychosocial issues?  No   Did the patient receive a copy of their discharge instructions?  Yes   Nursing interventions  Reviewed instructions with patient   What is the patient's perception of their health status since discharge?  Improving   Is the patient/caregiver able to teach back signs and symptoms related to disease process for when to call PCP?  Yes   Is the patient/caregiver able to teach back signs and symptoms related to disease process for when to call 911?  Yes   Is the patient/caregiver able to teach back the hierarchy of who to call/visit for symptoms/problems? PCP, Specialist, Home health nurse, Urgent Care, ED, 911  Yes   If the patient is a current smoker, are they able to teach back resources for cessation?  Not a smoker   TCM call completed?  Yes          Stefani Restrepo RN    5/20/2021, 09:19 EDT

## 2021-05-24 ENCOUNTER — LAB (OUTPATIENT)
Dept: LAB | Facility: HOSPITAL | Age: 61
End: 2021-05-24

## 2021-05-24 ENCOUNTER — OFFICE VISIT (OUTPATIENT)
Dept: FAMILY MEDICINE CLINIC | Facility: CLINIC | Age: 61
End: 2021-05-24

## 2021-05-24 VITALS
BODY MASS INDEX: 39.1 KG/M2 | DIASTOLIC BLOOD PRESSURE: 84 MMHG | SYSTOLIC BLOOD PRESSURE: 160 MMHG | HEART RATE: 85 BPM | RESPIRATION RATE: 16 BRPM | WEIGHT: 258 LBS | OXYGEN SATURATION: 98 % | HEIGHT: 68 IN

## 2021-05-24 DIAGNOSIS — R78.81 BACTEREMIA: ICD-10-CM

## 2021-05-24 DIAGNOSIS — I20.8 STABLE ANGINA PECTORIS (HCC): Chronic | ICD-10-CM

## 2021-05-24 DIAGNOSIS — H66.001 NON-RECURRENT ACUTE SUPPURATIVE OTITIS MEDIA OF RIGHT EAR WITHOUT SPONTANEOUS RUPTURE OF TYMPANIC MEMBRANE: ICD-10-CM

## 2021-05-24 DIAGNOSIS — L73.9 STAPHYLOCOCCUS AUREUS SUPERFICIAL FOLLICULITIS: ICD-10-CM

## 2021-05-24 DIAGNOSIS — B95.61 STAPHYLOCOCCUS AUREUS SUPERFICIAL FOLLICULITIS: ICD-10-CM

## 2021-05-24 DIAGNOSIS — Z09 HOSPITAL DISCHARGE FOLLOW-UP: ICD-10-CM

## 2021-05-24 DIAGNOSIS — I10 ESSENTIAL HYPERTENSION: Primary | Chronic | ICD-10-CM

## 2021-05-24 LAB
ANION GAP SERPL CALCULATED.3IONS-SCNC: 12.7 MMOL/L (ref 5–15)
BASOPHILS # BLD MANUAL: 0.13 10*3/MM3 (ref 0–0.2)
BASOPHILS NFR BLD AUTO: 1 % (ref 0–1.5)
BUN SERPL-MCNC: 17 MG/DL (ref 6–20)
BUN/CREAT SERPL: 16 (ref 7–25)
CALCIUM SPEC-SCNC: 9.7 MG/DL (ref 8.6–10.5)
CHLORIDE SERPL-SCNC: 96 MMOL/L (ref 98–107)
CO2 SERPL-SCNC: 27.3 MMOL/L (ref 22–29)
CREAT SERPL-MCNC: 1.06 MG/DL (ref 0.57–1)
CRP SERPL-MCNC: 3.2 MG/DL (ref 0–0.5)
DEPRECATED RDW RBC AUTO: 40.9 FL (ref 37–54)
EOSINOPHIL # BLD MANUAL: 1.2 10*3/MM3 (ref 0–0.4)
EOSINOPHIL NFR BLD MANUAL: 9.1 % (ref 0.3–6.2)
ERYTHROCYTE [DISTWIDTH] IN BLOOD BY AUTOMATED COUNT: 13.2 % (ref 12.3–15.4)
ERYTHROCYTE [SEDIMENTATION RATE] IN BLOOD: 72 MM/HR (ref 0–30)
GFR SERPL CREATININE-BSD FRML MDRD: 53 ML/MIN/1.73
GLUCOSE SERPL-MCNC: 193 MG/DL (ref 65–99)
HCT VFR BLD AUTO: 41.2 % (ref 34–46.6)
HGB BLD-MCNC: 13.9 G/DL (ref 12–15.9)
LYMPHOCYTES # BLD MANUAL: 2.01 10*3/MM3 (ref 0.7–3.1)
LYMPHOCYTES NFR BLD MANUAL: 15.2 % (ref 19.6–45.3)
LYMPHOCYTES NFR BLD MANUAL: 2 % (ref 5–12)
MCH RBC QN AUTO: 29.1 PG (ref 26.6–33)
MCHC RBC AUTO-ENTMCNC: 33.7 G/DL (ref 31.5–35.7)
MCV RBC AUTO: 86.4 FL (ref 79–97)
MONOCYTES # BLD AUTO: 0.26 10*3/MM3 (ref 0.1–0.9)
NEUTROPHILS # BLD AUTO: 9.6 10*3/MM3 (ref 1.7–7)
NEUTROPHILS NFR BLD MANUAL: 72.7 % (ref 42.7–76)
PLAT MORPH BLD: NORMAL
PLATELET # BLD AUTO: 494 10*3/MM3 (ref 140–450)
PMV BLD AUTO: 10.2 FL (ref 6–12)
POTASSIUM SERPL-SCNC: 3.5 MMOL/L (ref 3.5–5.2)
RBC # BLD AUTO: 4.77 10*6/MM3 (ref 3.77–5.28)
RBC MORPH BLD: NORMAL
SODIUM SERPL-SCNC: 136 MMOL/L (ref 136–145)
WBC # BLD AUTO: 13.21 10*3/MM3 (ref 3.4–10.8)
WBC MORPH BLD: NORMAL

## 2021-05-24 PROCEDURE — 85007 BL SMEAR W/DIFF WBC COUNT: CPT

## 2021-05-24 PROCEDURE — 80048 BASIC METABOLIC PNL TOTAL CA: CPT

## 2021-05-24 PROCEDURE — 85652 RBC SED RATE AUTOMATED: CPT

## 2021-05-24 PROCEDURE — 85027 COMPLETE CBC AUTOMATED: CPT

## 2021-05-24 PROCEDURE — 86140 C-REACTIVE PROTEIN: CPT

## 2021-05-24 PROCEDURE — 36415 COLL VENOUS BLD VENIPUNCTURE: CPT

## 2021-05-24 PROCEDURE — 99214 OFFICE O/P EST MOD 30 MIN: CPT | Performed by: NURSE PRACTITIONER

## 2021-05-24 RX ORDER — AMOXICILLIN AND CLAVULANATE POTASSIUM 875; 125 MG/1; MG/1
1 TABLET, FILM COATED ORAL 2 TIMES DAILY
Qty: 14 TABLET | Refills: 0 | Status: SHIPPED | OUTPATIENT
Start: 2021-05-24 | End: 2021-06-24

## 2021-05-24 RX ORDER — PREDNISONE 20 MG/1
20 TABLET ORAL 2 TIMES DAILY
Qty: 14 TABLET | Refills: 0 | Status: SHIPPED | OUTPATIENT
Start: 2021-05-24 | End: 2021-05-31

## 2021-05-24 RX ORDER — METOPROLOL SUCCINATE 50 MG/1
50 TABLET, EXTENDED RELEASE ORAL DAILY
Qty: 30 TABLET | Refills: 5 | Status: SHIPPED | OUTPATIENT
Start: 2021-05-24 | End: 2021-12-16

## 2021-05-26 ENCOUNTER — READMISSION MANAGEMENT (OUTPATIENT)
Dept: CALL CENTER | Facility: HOSPITAL | Age: 61
End: 2021-05-26

## 2021-05-26 NOTE — OUTREACH NOTE
Medical Week 2 Survey      Responses   [facility] patient discharged from?  [place]   Does the patient have one of the following disease processes/diagnoses(primary or secondary)?  Other   Week 2 attempt successful?  Yes   Call start time  1411   Call end time  1413   Meds reviewed with patient/caregiver?  Yes   Is the patient having any side effects they believe may be caused by any medication additions or changes?  No   Does the patient have all medications ordered at discharge?  Yes   Is the patient taking all medications as directed (includes completed medication regime)?  Yes   Does the patient have a primary care provider?   Yes   Has the patient kept scheduled appointments due by today?  Yes   Has home health visited the patient within 72 hours of discharge?  N/A   Psychosocial issues?  No   Did the patient receive a copy of their discharge instructions?  Yes   Nursing interventions  Reviewed instructions with patient   What is the patient's perception of their health status since discharge?  Same   Is the patient/caregiver able to teach back signs and symptoms related to disease process for when to call PCP?  Yes   Is the patient/caregiver able to teach back signs and symptoms related to disease process for when to call 911?  Yes   Is the patient/caregiver able to teach back the hierarchy of who to call/visit for symptoms/problems? PCP, Specialist, Home health nurse, Urgent Care, ED, 911  Yes   If the patient is a current smoker, are they able to teach back resources for cessation?  Not a smoker   Additional teach back comments  PATIENT STATES SHE IS STILL HAVING HIGH BLOOD PRESSURE. HER PCP ADJUSTED HER MEDICATIONS AND SHE HAS AN APPOINTMENT WITH THE CARDIOLOGIST NEXT WEEK.    Week 2 Call Completed?  Yes          Renate Allen LPN

## 2021-05-27 ENCOUNTER — HOSPITAL ENCOUNTER (OUTPATIENT)
Dept: GENERAL RADIOLOGY | Facility: HOSPITAL | Age: 61
Discharge: HOME | End: 2021-05-27
Admitting: STUDENT IN AN ORGANIZED HEALTH CARE EDUCATION/TRAINING PROGRAM

## 2021-05-27 DIAGNOSIS — M86.172 ACUTE OSTEOMYELITIS OF LEFT ANKLE OR FOOT (HCC): ICD-10-CM

## 2021-05-27 PROCEDURE — 73630 X-RAY EXAM OF FOOT: CPT

## 2021-06-01 ENCOUNTER — TELEPHONE (OUTPATIENT)
Dept: FAMILY MEDICINE CLINIC | Facility: CLINIC | Age: 61
End: 2021-06-01

## 2021-06-01 DIAGNOSIS — I10 ESSENTIAL HYPERTENSION: Primary | ICD-10-CM

## 2021-06-01 RX ORDER — VENLAFAXINE HYDROCHLORIDE 150 MG/1
CAPSULE, EXTENDED RELEASE ORAL
Qty: 90 CAPSULE | Refills: 1 | Status: SHIPPED | OUTPATIENT
Start: 2021-06-01 | End: 2021-08-24

## 2021-06-01 RX ORDER — LISINOPRIL 40 MG/1
40 TABLET ORAL DAILY
Qty: 90 TABLET | Refills: 1 | Status: SHIPPED | OUTPATIENT
Start: 2021-06-01 | End: 2021-06-24 | Stop reason: DRUGHIGH

## 2021-06-01 NOTE — TELEPHONE ENCOUNTER
----- Message from BEBE Rincon sent at 6/1/2021  3:38 PM CDT -----  Regarding: please inform pt BP record reviewed, new rx to increase lisinopril to 40mg daily was sent, send BP in 1 week for follow up.    ----- Message -----  From: Kristel Eng  Sent: 5/28/2021  10:30 AM CDT  To: BEBE Rincon    BLOOD PRESSURE READINGS!    MON- 147/88 HR 84  NOON- 151/85 HR 84    TUES-128/75 HR 68  NOON- 118/68 HR67    WED-179/99 HR 83  NOON-157/97     THURS- 158/106 HR 75  NOON- 147/74 HR 69    WANTS REFILL ON [facility]ON- [facility]         INF DIS DOC CHANGED ABX THAT YOU PUT HER ON.   ADDED,     CEPHALAXIN 500MG 4X DAILY  CIPRO 500MG BID

## 2021-06-03 ENCOUNTER — READMISSION MANAGEMENT (OUTPATIENT)
Dept: CALL CENTER | Facility: HOSPITAL | Age: 61
End: 2021-06-03

## 2021-06-03 NOTE — OUTREACH NOTE
"Medical Week 3 Survey      Responses   [facility] patient discharged from?  [facility]   Does the patient have one of the following disease processes/diagnoses(primary or secondary)?  Other   Week 3 attempt successful?  Yes   Call start time  1548   Call end time  1552   Discharge diagnosis  chest pain and htn resolved   Is patient permission given to speak with other caregiver?  Yes   List who call center can speak with  Nadeem spouse    Person spoke with today (if not patient) and relationship  Nadeem spouse    Week 3 Call Completed?  Yes   Wrap up additional comments  Nadeem spouse was difficult to understand at times but apparently pt was admitted to \"[facility] on 5-26-21 and has been there since          Kristel Cook RN  "

## 2021-06-13 DIAGNOSIS — E78.2 MIXED HYPERLIPIDEMIA: Chronic | ICD-10-CM

## 2021-06-14 ENCOUNTER — READMISSION MANAGEMENT (OUTPATIENT)
Dept: CALL CENTER | Facility: HOSPITAL | Age: 61
End: 2021-06-14

## 2021-06-14 RX ORDER — CLOPIDOGREL BISULFATE 75 MG/1
TABLET ORAL
Qty: 90 TABLET | Refills: 1 | Status: SHIPPED | OUTPATIENT
Start: 2021-06-14 | End: 2021-12-02

## 2021-06-14 RX ORDER — ATORVASTATIN CALCIUM 80 MG/1
TABLET, FILM COATED ORAL
Qty: 90 TABLET | Refills: 1 | Status: SHIPPED | OUTPATIENT
Start: 2021-06-14 | End: 2021-12-06

## 2021-06-14 NOTE — OUTREACH NOTE
Medical Week 4 Survey      Responses   [facility] patient discharged from?  [facility]   Does the patient have one of the following disease processes/diagnoses(primary or secondary)?  Other   Week 4 attempt successful?  Yes   Call start time  1553   Revoke  Other transitional program [She is in Rehab. ]   Call end time  1553   Is patient permission given to speak with other caregiver?  Yes   List who call center can speak with  Nadeem spouse    Person spoke with today (if not patient) and relationship  Nadeem Powers RN

## 2021-06-17 ENCOUNTER — TELEPHONE (OUTPATIENT)
Dept: FAMILY MEDICINE CLINIC | Facility: CLINIC | Age: 61
End: 2021-06-17

## 2021-06-19 DIAGNOSIS — IMO0002 UNCONTROLLED TYPE 2 DIABETES MELLITUS WITH NEUROLOGIC COMPLICATION, WITH LONG-TERM CURRENT USE OF INSULIN: Primary | ICD-10-CM

## 2021-06-24 ENCOUNTER — OFFICE VISIT (OUTPATIENT)
Dept: FAMILY MEDICINE CLINIC | Facility: CLINIC | Age: 61
End: 2021-06-24

## 2021-06-24 VITALS
HEART RATE: 89 BPM | DIASTOLIC BLOOD PRESSURE: 74 MMHG | SYSTOLIC BLOOD PRESSURE: 122 MMHG | OXYGEN SATURATION: 97 % | BODY MASS INDEX: 39.24 KG/M2 | RESPIRATION RATE: 16 BRPM | HEIGHT: 68 IN | TEMPERATURE: 97.9 F

## 2021-06-24 DIAGNOSIS — M62.838 MUSCLE SPASM: ICD-10-CM

## 2021-06-24 DIAGNOSIS — G89.29 CHRONIC RIGHT-SIDED LOW BACK PAIN WITH RIGHT-SIDED SCIATICA: ICD-10-CM

## 2021-06-24 DIAGNOSIS — F41.1 GENERALIZED ANXIETY DISORDER: ICD-10-CM

## 2021-06-24 DIAGNOSIS — I10 ESSENTIAL HYPERTENSION: ICD-10-CM

## 2021-06-24 DIAGNOSIS — G54.6 PHANTOM PAIN AFTER AMPUTATION OF LOWER EXTREMITY (HCC): Primary | Chronic | ICD-10-CM

## 2021-06-24 DIAGNOSIS — M54.41 CHRONIC RIGHT-SIDED LOW BACK PAIN WITH RIGHT-SIDED SCIATICA: ICD-10-CM

## 2021-06-24 PROBLEM — Z89.512 S/P BKA (BELOW KNEE AMPUTATION) UNILATERAL, LEFT (HCC): Status: ACTIVE | Noted: 2021-06-18

## 2021-06-24 PROBLEM — I65.23 OCCLUSION AND STENOSIS OF BILATERAL CAROTID ARTERIES: Status: ACTIVE | Noted: 2021-06-18

## 2021-06-24 PROCEDURE — 99214 OFFICE O/P EST MOD 30 MIN: CPT | Performed by: NURSE PRACTITIONER

## 2021-06-24 RX ORDER — LISINOPRIL 40 MG/1
20 TABLET ORAL DAILY
Qty: 30 TABLET | Refills: 3 | Status: CANCELLED | OUTPATIENT
Start: 2021-06-24

## 2021-06-24 RX ORDER — LISINOPRIL 20 MG/1
20 TABLET ORAL DAILY
Qty: 90 TABLET | Refills: 1 | Status: SHIPPED | OUTPATIENT
Start: 2021-06-24 | End: 2021-11-29

## 2021-06-24 RX ORDER — METHOCARBAMOL 500 MG/1
500 TABLET, FILM COATED ORAL 2 TIMES DAILY PRN
Qty: 60 TABLET | Refills: 5 | Status: SHIPPED | OUTPATIENT
Start: 2021-06-24 | End: 2021-12-16

## 2021-06-24 RX ORDER — HYDROCODONE BITARTRATE AND ACETAMINOPHEN 7.5; 325 MG/1; MG/1
1 TABLET ORAL EVERY 6 HOURS PRN
Qty: 120 TABLET | Refills: 0 | Status: SHIPPED | OUTPATIENT
Start: 2021-06-24 | End: 2021-07-27 | Stop reason: SDUPTHER

## 2021-06-24 RX ORDER — LORAZEPAM 0.5 MG/1
0.5 TABLET ORAL EVERY 8 HOURS PRN
Qty: 90 TABLET | Refills: 0 | Status: SHIPPED | OUTPATIENT
Start: 2021-06-24 | End: 2021-09-09 | Stop reason: SDUPTHER

## 2021-06-24 NOTE — PROGRESS NOTES
Subjective   Ariana Martinez is a 61 y.o. female.       Chief Complaint   Patient presents with   • ltc     REHAB F/U   • Hypertension     f/u        History of Present Illness     Patient was admitted to [facility] 5/28/2021 after orthopedist obtained her recent lab results done in follow up of sepsis from osteomyelitis of left foot with secondary endocarditis, as she had elevated WBC over 20K and demonstrated CHON. She was lightheaded and had been experiencing episodic hypertensive problems for several weeks. In ED her blood pressure was hypotensive and she was admitted with abnormal labs,sepsis and hypotension, known recent osteomyelitis left foot w/ subsequent septic shock/ endocarditis, and current CHON, dehydration.  In hospital she was started on IV hydration and IV antibiotics and evaluations identified persistent or recurrence of osteomyelitis of left Talus and calcaneous. Vascular surgery, cardiology and infectious diseases were consulted and she underwent left BKA on 5/31/21 with market improvement in symptoms afterward. BC neg x 3, antibiotics stopped after amputation and was discharged on 6/4/21 to [facility] rehabilitation for PT.     She was admitted to the rehabilitation center on 6/4/21 and discharged home on 6/16/21. Reports no chest pain, no fevers and excellent HTN control since discharge and was decreased to lisinopril 20mg with stable results. /74 HR 89 in office today. Also on metoprolol 50mg XR daily.     She does report worst pain now is the phantom pain of the absent left foot. Was prescribed 5mg hydrocodone, 2 tablets each dose upon discharge from rehab center, but reports at 10mg she has visual hallucinations and gets nauseated, requests decrease back to previous 7.5mg/ 325mg apap, which causes her no side effects.     Chronic anxiety normally well controlled     Other complaints today: none.   Parts of most recent relevant visit HPI, ROS  and PE  may be carried forward and all are updated as appropriate for current situation.    Past Surgical History:   Procedure Laterality Date   • ABDOMINAL SURGERY     • AMPUTATION FOOT     • ANGIOPLASTY      coronary   • ANKLE ARTHROSCOPY Left 2/26/2021    Procedure: Left foot hardware removal, ankle arthroscopy, bone grafting , left foot exostectomy;  Surgeon: Ignacio Lord DPM;  Location: [facility] OR;  Service: Podiatry;  Laterality: Left;   • BREAST BIOPSY Right    • CARDIAC CATHETERIZATION     • CARDIAC CATHETERIZATION N/A 6/20/2017    Procedure: Right Heart Cath;  Surgeon: Can Kwon MD PhD;  Location: [facility] CATH INVASIVE LOCATION;  Service:    • CARDIAC CATHETERIZATION N/A 2/18/2020    Procedure: Left Heart Cath;  Surgeon: Catalina Cooper MD;  Location: [facility] CATH INVASIVE LOCATION;  Service: Cardiology;  Laterality: N/A;   • CARPAL TUNNEL RELEASE     • CHOLECYSTECTOMY     • COLONOSCOPY N/A 6/24/2020    Procedure: COLONOSCOPY;  Surgeon: Julián Maldonado MD;  Location: [facility] ENDOSCOPY;  Service: Gastroenterology;  Laterality: N/A;   • CORONARY ARTERY BYPASS GRAFT  2005   • ENDOSCOPY N/A 10/19/2018    Procedure: ESOPHAGOGASTRODUODENOSCOPY possible dilation;  Surgeon: Julián Maldonado MD;  Location: [facility] ENDOSCOPY;  Service: Gastroenterology   • ENDOSCOPY N/A 6/24/2020    Procedure: ESOPHAGOGASTRODUODENOSCOPY WED appt please;  Surgeon: Julián Maldonado MD;  Location: [facility] ENDOSCOPY;  Service: Gastroenterology;  Laterality: N/A;   • ENDOSCOPY AND COLONOSCOPY     • FOOT SURGERY      Toes   • FOOT SURGERY     • GASTRIC BANDING      Revision, laparoscopic   • HYSTERECTOMY     • INCISION AND DRAINAGE LEG Left 3/12/2021    Procedure: Left ankle arthroscopic irrigation and debridement, screw removal;  Surgeon: Ignacio Lord DPM;  Location: [facility] OR;  Service: Podiatry;  Laterality: Left;   • MOUTH SURGERY     • SALPINGO OOPHORECTOMY     • SHOULDER SURGERY     • SUBTALAR ARTHRODESIS Left  1/16/2019    Procedure: LEFT FOOT HARDWARE REMOVAL, FIFTH METATARSAL , OPEN REDUCTION INTERNAL FIXATION, CALCANEAL OSTEOTOMY;  Surgeon: Ignacio Lord DPM;  Location: [facility];  Service: Podiatry   • SUBTALAR ARTHRODESIS Left 10/16/2019    Procedure: foot hardware removal, subtalar joint fusion  possible de/reattachment of achilles tendon        (c-arm);  Surgeon: Ignacio Lord DPM;  Location: [facility];  Service: Podiatry   • SUBTALAR ARTHRODESIS Left 9/30/2020    Procedure: subtalar, talonavicular joint arthrodesis.  Removal hardware.          (c-arm);  Surgeon: Ignacio Lord DPM;  Location: [facility];  Service: Podiatry;  Laterality: Left;   • TRANSESOPHAGEAL ECHOCARDIOGRAM (LAMONTE)      With color flow      Social History     Socioeconomic History   • Marital status:      Spouse name: Not on file   • Number of children: Not on file   • Years of education: Not on file   • Highest education level: Not on file   Tobacco Use   • Smoking status: Never Smoker   • Smokeless tobacco: Never Used   Vaping Use   • Vaping Use: Never used   Substance and Sexual Activity   • Alcohol use: No   • Drug use: No   • Sexual activity: Defer      The following portions of the patient's history were reviewed and updated as appropriate: She  has a past medical history of Angina, class IV (CMS/HCC), Anxiety, Anxiety and depression, Arthritis, Benign paroxysmal positional vertigo, Bladder disorder, Carpal tunnel syndrome, CHF (congestive heart failure) (CMS/HCC), Chronic pain, Coronary atherosclerosis, Depression, Diabetes mellitus (CMS/HCC), Diabetic polyneuropathy (CMS/HCC), Disease of thyroid gland, Elevated cholesterol, Female stress incontinence, Foot pain, left, Full dentures, Gastroparesis, GERD (gastroesophageal reflux disease), Hyperlipidemia, Hypertension, Low back pain, Malaise and fatigue, Multiple joint pain, Obesity, Occlusion and stenosis of bilateral carotid arteries (6/18/2021), Otalgia, Perforation  of tympanic membrane, Postoperative wound infection, Vitamin D deficiency, and Wears glasses..    Review of Systems   Constitutional: Negative.    HENT: Negative.  Negative for congestion.    Eyes: Negative.  Negative for blurred vision, double vision, photophobia and visual disturbance.   Respiratory: Negative.  Negative for cough, shortness of breath, wheezing and stridor.    Cardiovascular: Negative.  Negative for chest pain, palpitations and leg swelling.   Gastrointestinal: Negative.  Negative for abdominal distention, abdominal pain, blood in stool, constipation, diarrhea, nausea, vomiting, GERD and indigestion.   Endocrine: Negative.  Negative for cold intolerance and heat intolerance.   Genitourinary: Negative.  Negative for dysuria, flank pain, frequency and urinary incontinence.   Musculoskeletal: Positive for arthralgias. Negative for back pain.   Skin: Negative.    Allergic/Immunologic: Negative.  Negative for immunocompromised state.   Neurological: Negative.    Hematological: Negative.    Psychiatric/Behavioral: Negative.  Negative for agitation, behavioral problems, decreased concentration, dysphoric mood, hallucinations, self-injury, sleep disturbance, suicidal ideas, negative for hyperactivity, depressed mood and stress. The patient is not nervous/anxious.    All other systems reviewed and are negative.    PHQ-9 Depression Screening  Little interest or pleasure in doing things?     Feeling down, depressed, or hopeless?     Trouble falling or staying asleep, or sleeping too much?     Feeling tired or having little energy?     Poor appetite or overeating?     Feeling bad about yourself - or that you are a failure or have let yourself or your family down?     Trouble concentrating on things, such as reading the newspaper or watching television?     Moving or speaking so slowly that other people could have noticed? Or the opposite - being so fidgety or restless that you have been moving around a lot  "more than usual?     Thoughts that you would be better off dead, or of hurting yourself in some way?     PHQ-9 Total Score     If you checked off any problems, how difficult have these problems made it for you to do your work, take care of things at home, or get along with other people?      Patient understands the risks associated with this controlled medication, including tolerance and addiction.  Patient also agrees to only obtain this medication from me, and not from a another provider, unless that provider is covering for me in my absence.  Patient also agrees to be compliant in dosing, and not self adjust the dose of medication.  A signed controlled substance agreement is on file, and the patient has received a controlled substance education sheet at this a previous visit.  The patient has also signed a consent for treatment with a controlled substance as per [facility] policy. LESTER was obtained.    Objective    Vitals:    06/24/21 0904   BP: 122/74   BP Location: Right arm   Patient Position: Sitting   Cuff Size: Adult   Pulse: 89   Resp: 16   Temp: 97.9 °F (36.6 °C)   SpO2: 97%   Weight: Comment: unable to weigh   Height: 172.7 cm (67.99\")   PainSc:   5   PainLoc: Leg       Physical Exam  Vitals and nursing note reviewed.   Constitutional:       General: She is not in acute distress.     Appearance: Normal appearance. She is well-developed. She is obese. She is not ill-appearing or diaphoretic.   HENT:      Head: Normocephalic and atraumatic.      Right Ear: External ear normal.      Left Ear: External ear normal.      Nose: Nose normal.   Eyes:      General: Lids are normal. Lids are everted, no foreign bodies appreciated. No scleral icterus.        Right eye: No discharge.         Left eye: No discharge.      Conjunctiva/sclera: Conjunctivae normal.      Pupils: Pupils are equal, round, and reactive to light.   Neck:      Thyroid: No thyromegaly.      Vascular: No carotid bruit or JVD.      Trachea: " No tracheal deviation.   Cardiovascular:      Rate and Rhythm: Normal rate and regular rhythm.      Pulses: Normal pulses.      Heart sounds: Murmur heard.   Systolic murmur is present with a grade of 2/6.   No friction rub. No gallop.    Pulmonary:      Effort: Pulmonary effort is normal. No respiratory distress.      Breath sounds: Normal breath sounds. No stridor. No wheezing, rhonchi or rales.   Chest:      Chest wall: No tenderness.   Abdominal:      General: Bowel sounds are normal.      Palpations: Abdomen is soft.   Musculoskeletal:         General: No tenderness or deformity. Normal range of motion.      Cervical back: Normal range of motion and neck supple. No rigidity or tenderness.      Comments: Presents with left residual leg immobilized in splint, sitting in wheelchair. Surgical site is not viewed due to dressings.       Left Lower Extremity: Left leg is amputated below knee. (5/31/21)  Lymphadenopathy:      Cervical: No cervical adenopathy.   Skin:     General: Skin is warm and dry.      Capillary Refill: Capillary refill takes 2 to 3 seconds.      Coloration: Skin is not jaundiced or pale.      Findings: No bruising, erythema, lesion or rash.   Neurological:      General: No focal deficit present.      Mental Status: She is alert and oriented to person, place, and time. Mental status is at baseline.      Motor: No weakness.      Gait: Gait normal.   Psychiatric:         Mood and Affect: Mood normal.         Behavior: Behavior normal.         Thought Content: Thought content normal.         Judgment: Judgment normal.       20 minutes spent in medical record review including labs and notes / [facility].  Stable HTN. Stable LLE pain. Muscle spasms, new, Rx robaxin today.   Stable phantom pain left LE after BKA, refill hydrocodone today.   Worsening anxiety due to altered mobility and BKA, increased lorazepam to TID prn today, refill sent.    Assessment/Plan   Diagnoses and all orders for this visit:    1. 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  Dispense: 120 tablet; Refill: 0    2. Essential hypertension  -     lisinopril (PRINIVIL,ZESTRIL) 20 MG tablet; Take 1 tablet by mouth Daily.  Dispense: 90 tablet; Refill: 1    3. Chronic right-sided low back pain with right-sided sciatica  -     HYDROcodone-acetaminophen (NORCO) 7.5-325 MG per tablet; Take 1 tablet by mouth Every 6 (Six) Hours As Needed for Moderate Pain .  Dispense: 120 tablet; Refill: 0    4. Muscle spasm  -     methocarbamol (Robaxin) 500 MG tablet; Take 1 tablet by mouth 2 (Two) Times a Day As Needed for Muscle Spasms.  Dispense: 60 tablet; Refill: 5    5. Generalized anxiety disorder  -     LORazepam (ATIVAN) 0.5 MG tablet; Take 1 tablet by mouth Every 8 (Eight) Hours As Needed for Anxiety. Frequency and # increased on 6/24/21. Refill early accordingly.  Dispense: 90 tablet; Refill: 0    Other orders  -     Cancel: lisinopril (PRINIVIL,ZESTRIL) 40 MG tablet; Take 0.5 tablets by mouth Daily. Dose increased 6/1/21  Dispense: 30 tablet; Refill: 3    Return in about 12 weeks (around 9/16/2021), or if symptoms worsen or fail to improve.                 This document has been electronically signed by BEBE Palomino on June 24, 2021 12:20 CDT

## 2021-06-28 ENCOUNTER — TELEPHONE (OUTPATIENT)
Dept: FAMILY MEDICINE CLINIC | Facility: CLINIC | Age: 61
End: 2021-06-28

## 2021-06-28 DIAGNOSIS — G47.00 INSOMNIA, UNSPECIFIED TYPE: Primary | ICD-10-CM

## 2021-06-28 RX ORDER — MIRTAZAPINE 30 MG/1
30 TABLET, FILM COATED ORAL NIGHTLY
Qty: 30 TABLET | Refills: 5 | Status: SHIPPED | OUTPATIENT
Start: 2021-06-28 | End: 2021-08-19 | Stop reason: SDUPTHER

## 2021-06-28 NOTE — TELEPHONE ENCOUNTER
Pt was wanting to see if she could her have Remeron refilled she hasn't had this in awhile but needs for sleep she said

## 2021-06-28 NOTE — TELEPHONE ENCOUNTER
----- Message from BEBE Rincon sent at 6/28/2021  9:55 AM CDT -----  I dropped to 30 mg dose and sent refills, but looks like hasnt refilled since 2019 and her insurance doesn't look like they cover this so she may have to try something else. Let me know.   ----- Message -----  From: Kameron, Marianne Ventura MA  Sent: 6/28/2021   9:45 AM CDT  To: BEBE Rincon    Pt was wanting to see if she could her have Remeron refilled she hasn't had this in awhile but needs for sleep she said

## 2021-06-30 ENCOUNTER — OFFICE VISIT (OUTPATIENT)
Dept: CARDIOLOGY | Facility: CLINIC | Age: 61
End: 2021-06-30

## 2021-06-30 VITALS
OXYGEN SATURATION: 98 % | HEIGHT: 68 IN | BODY MASS INDEX: 39.1 KG/M2 | WEIGHT: 258 LBS | DIASTOLIC BLOOD PRESSURE: 76 MMHG | TEMPERATURE: 97.3 F | HEART RATE: 104 BPM | SYSTOLIC BLOOD PRESSURE: 130 MMHG

## 2021-06-30 DIAGNOSIS — I10 UNCONTROLLED HYPERTENSION: Primary | ICD-10-CM

## 2021-06-30 DIAGNOSIS — I10 ESSENTIAL HYPERTENSION: ICD-10-CM

## 2021-06-30 DIAGNOSIS — I25.10 CORONARY ARTERY DISEASE INVOLVING NATIVE CORONARY ARTERY OF NATIVE HEART WITHOUT ANGINA PECTORIS: ICD-10-CM

## 2021-06-30 DIAGNOSIS — Z95.1 S/P CABG (CORONARY ARTERY BYPASS GRAFT): ICD-10-CM

## 2021-06-30 PROCEDURE — 99215 OFFICE O/P EST HI 40 MIN: CPT | Performed by: INTERNAL MEDICINE

## 2021-06-30 RX ORDER — METOCLOPRAMIDE 10 MG/1
TABLET ORAL
COMMUNITY
Start: 2021-06-14 | End: 2021-06-30 | Stop reason: SDUPTHER

## 2021-06-30 RX ORDER — HYDROCHLOROTHIAZIDE 25 MG/1
25 TABLET ORAL DAILY
COMMUNITY
Start: 2021-05-18 | End: 2021-09-24

## 2021-06-30 NOTE — PROGRESS NOTES
Ariana Martinez  61 y.o. female    06/30/2021  1. Uncontrolled hypertension    2. Essential hypertension    3. Coronary artery disease involving native coronary artery of native heart without angina pectoris    4. S/P CABG (coronary artery bypass graft)        History of Present Illness  Ariana Martinez is a 61-year-old  lady who is being seen by me for the first time.  She was a patient of Dr. Kwon in the past.  She has a complex medical history which is remarkable for coronary artery disease status post coronary artery bypass surgery in 2005, insulin-dependent type 2 diabetes mellitus with complications including gastroparesis, hypertension, anxiety/depression, hyperlipidemia, GERD,   HFpEF,  vitamin D deficiency. She was treated for left foot pain and found to have hardware infection with MSSA and Acinetobacter.  She was treated for infective endocarditis of the mitral valve.  She was also noted to have a pneumonia on CTA.  At [facility] in March 2021.    She was admitted to [facility] for evaluation of chest pain in May 2021.  Cardiac enzymes are negative for myocardial injury and EKG showed no acute ST-T wave changes.  Her blood pressure was noted to be markedly elevated.  D-dimer was elevated and CT of the chest was negative for PE.  Lexiscan Cardiolite stress test showed:  · EKG findings equivocal secondary to baseline artifacts during Lexiscan infusion  · Small apical fixed defect noted. No significant reversible ischemia noted. Perfusion in the septum, anterior wall, lateral wall and inferior wall appear normal  · Ejection fraction 60%. No wall motion abnormalities  · Low risk study  Echocardiogram showed:  · The study is technically difficult for diagnosis. The quality of the study is limited due to patient body habitus and lung disease.  · Estimated left ventricular EF = 67% Left ventricular ejection fraction appears to be 66 - 70%. Left ventricular systolic function is  normal.  · Left ventricular diastolic function is consistent with (grade I) impaired relaxation.  · Estimated right ventricular systolic pressure from tricuspid regurgitation is normal (<35 mmHg).      Her last cardiac catheterization was in February 2020 by Dr. Cooper which showed:  This patient who had a LIMA bypass in 2005 that is nonfunctioning.  The LAD is total from the midportion on.  The only flow given to the LAD region which also would include the apex in the inferoapical portion on a wraparound LAD is in the proximal LAD going into the diagonal branch.  This is been successfully stented with drug-eluting stents x2.  She has flow into the circumflex and right coronary artery and now except for the mid LAD portion is completely revascularized once again.     She has progressed reasonably well since discharge from hospital and denied any chest pain or shortness of breath.  She claims compliant with her medications.  Her heart rate was noted to be elevated but she indicated that she had not taken her morning medicines.  She is on metoprolol succinate.  No signs of congestive heart failure was noted.  Her blood pressure was in the normal range.    SUBJECTIVE    Allergies   Allergen Reactions   • Seroquel [Quetiapine] Anaphylaxis   • Avandia [Rosiglitazone] Swelling   • Morphine And Related Hallucinations   • Oxycodone Hallucinations         Past Medical History:   Diagnosis Date   • Angina, class IV (CMS/HCC)    • Anxiety    • Anxiety and depression    • Arthritis    • Benign paroxysmal positional vertigo    • Bladder disorder     has bladder stimulator   • Carpal tunnel syndrome    • CHF (congestive heart failure) (CMS/HCC)    • Chronic pain    • Coronary atherosclerosis     hx CABG 2005.  is followed by Dr Kwon   • Depression    • Diabetes mellitus (CMS/HCC)     Type 2, controlled   • Diabetic polyneuropathy (CMS/HCC)    • Disease of thyroid gland    • Elevated cholesterol    • Female stress incontinence     • Foot pain, left    • Full dentures    • Gastroparesis    • GERD (gastroesophageal reflux disease)    • Hyperlipidemia    • Hypertension    • Low back pain    • Malaise and fatigue    • Multiple joint pain    • Obesity     Refuses to be weighed   • Occlusion and stenosis of bilateral carotid arteries 6/18/2021   • Otalgia     Both   • Perforation of tympanic membrane     Left   • Postoperative wound infection    • Vitamin D deficiency    • Wears glasses     reading         Past Surgical History:   Procedure Laterality Date   • ABDOMINAL SURGERY     • AMPUTATION FOOT     • ANGIOPLASTY      coronary   • ANKLE ARTHROSCOPY Left 2/26/2021    Procedure: Left foot hardware removal, ankle arthroscopy, bone grafting , left foot exostectomy;  Surgeon: Ignacio Lord DPM;  Location: [facility] OR;  Service: Podiatry;  Laterality: Left;   • BREAST BIOPSY Right    • CARDIAC CATHETERIZATION     • CARDIAC CATHETERIZATION N/A 6/20/2017    Procedure: Right Heart Cath;  Surgeon: Can Kwon MD PhD;  Location: [facility] CATH INVASIVE LOCATION;  Service:    • CARDIAC CATHETERIZATION N/A 2/18/2020    Procedure: Left Heart Cath;  Surgeon: Catalina Cooper MD;  Location: [facility] CATH INVASIVE LOCATION;  Service: Cardiology;  Laterality: N/A;   • CARPAL TUNNEL RELEASE     • CHOLECYSTECTOMY     • COLONOSCOPY N/A 6/24/2020    Procedure: COLONOSCOPY;  Surgeon: Julián Maldonado MD;  Location: [facility] ENDOSCOPY;  Service: Gastroenterology;  Laterality: N/A;   • CORONARY ARTERY BYPASS GRAFT  2005   • ENDOSCOPY N/A 10/19/2018    Procedure: ESOPHAGOGASTRODUODENOSCOPY possible dilation;  Surgeon: Julián Maldonado MD;  Location: [facility] ENDOSCOPY;  Service: Gastroenterology   • ENDOSCOPY N/A 6/24/2020    Procedure: ESOPHAGOGASTRODUODENOSCOPY WED appt please;  Surgeon: Julián Maldonado MD;  Location: [facility] ENDOSCOPY;  Service: Gastroenterology;  Laterality: N/A;   • ENDOSCOPY AND COLONOSCOPY     • FOOT SURGERY      Toes   • FOOT  SURGERY     • GASTRIC BANDING      Revision, laparoscopic   • HYSTERECTOMY     • INCISION AND DRAINAGE LEG Left 3/12/2021    Procedure: Left ankle arthroscopic irrigation and debridement, screw removal;  Surgeon: Ignacio Lord DPM;  Location: [facility];  Service: Podiatry;  Laterality: Left;   • MOUTH SURGERY     • SALPINGO OOPHORECTOMY     • SHOULDER SURGERY     • SUBTALAR ARTHRODESIS Left 1/16/2019    Procedure: LEFT FOOT HARDWARE REMOVAL, FIFTH METATARSAL , OPEN REDUCTION INTERNAL FIXATION, CALCANEAL OSTEOTOMY;  Surgeon: Ignacio Lord DPM;  Location: [facility];  Service: Podiatry   • SUBTALAR ARTHRODESIS Left 10/16/2019    Procedure: foot hardware removal, subtalar joint fusion  possible de/reattachment of achilles tendon        (c-arm);  Surgeon: Ignacio Lord DPM;  Location: [facility];  Service: Podiatry   • SUBTALAR ARTHRODESIS Left 9/30/2020    Procedure: subtalar, talonavicular joint arthrodesis.  Removal hardware.          (c-arm);  Surgeon: Ignacio Lord DPM;  Location: [facility];  Service: Podiatry;  Laterality: Left;   • TRANSESOPHAGEAL ECHOCARDIOGRAM (LAMONTE)      With color flow         Family History   Problem Relation Age of Onset   • Diabetes Other    • Heart disease Other    • Hypertension Other    • Heart disease Mother    • Stroke Mother    • Hypertension Mother    • Diabetes Sister    • Heart disease Sister    • Hypertension Sister    • Heart disease Sister    • Diabetes Sister    • Hypertension Sister    • Diabetes Sister    • Diabetes Sister    • Diabetes Sister    • Diabetes Sister          Social History     Socioeconomic History   • Marital status:      Spouse name: Not on file   • Number of children: Not on file   • Years of education: Not on file   • Highest education level: Not on file   Tobacco Use   • Smoking status: Never Smoker   • Smokeless tobacco: Never Used   Vaping Use   • Vaping Use: Never used   Substance and Sexual Activity   • Alcohol use: No   •  Drug use: No   • Sexual activity: Defer         Current Outpatient Medications   Medication Sig Dispense Refill   • ARIPiprazole (ABILIFY) 15 MG tablet TAKE 1 TABLET BY MOUTH EVERY DAY 90 tablet 1   • aspirin (aspirin) 81 MG EC tablet Take 81 mg by mouth Daily.     • atorvastatin (LIPITOR) 80 MG tablet TAKE 1 TABLET BY MOUTH EVERY DAY 90 tablet 1   • BD SHARPS CONTAINER HOME misc 1 each Take As Directed. 1 each 0   • Blood Glucose Monitoring Suppl (ONE TOUCH ULTRA MINI) w/Device kit USE AS DIRECTED TO CHECK BLOOD SUGAR 1 each 0   • Calcium Citrate-Vitamin D (CITRACAL/VITAMIN D) 250-200 MG-UNIT tablet Take 2 tablets by mouth 2 (two) times a day.     • clopidogrel (PLAVIX) 75 MG tablet TAKE 1 TABLET BY MOUTH EVERY DAY 90 tablet 1   • cyanocobalamin 1000 MCG/ML injection INJECT 1 ML INTO THE APPROPRIATE MUSCLE AS DIRECTED BY PRESCRIBER EVERY 28 (TWENTY-EIGHT) DAYS. 3 mL 0   • folic acid (FOLVITE) 1 MG tablet Take 1 tablet by mouth Daily. 90 tablet 1   • furosemide (LASIX) 40 MG tablet TAKE 1 TABLET BY MOUTH EVERY DAY 90 tablet 1   • gabapentin (NEURONTIN) 400 MG capsule Take 1 capsule by mouth 3 (Three) Times a Day. 90 capsule 2   • GLUCAGON EMERGENCY 1 MG injection USE AS DIRECTED AS NEEDED 1 kit 0   • glucose blood (Accu-Chek Iris Plus) test strip USE TO CHECK BLOOD SUGAR 4 TIMES DAILY. E11.9 100 each 3   • glucose monitor monitoring kit 1 each Daily. accucheck iris meter, E11.9 1 each 0   • hydroCHLOROthiazide (HYDRODIURIL) 25 MG tablet Take 25 mg by mouth Daily.     • HYDROcodone-acetaminophen (NORCO) 7.5-325 MG per tablet Take 1 tablet by mouth Every 6 (Six) Hours As Needed for Moderate Pain . 120 tablet 0   • Insulin Glargine (BASAGLAR KWIKPEN) 100 UNIT/ML injection pen Inject 60 Units under the skin into the appropriate area as directed Every Night.     • Insulin Pen Needle (B-D ULTRAFINE III SHORT PEN) 31G X 8 MM misc USE TO INJECT 4 TIMES A  each 11   • lisinopril (PRINIVIL,ZESTRIL) 20 MG tablet Take  "1 tablet by mouth Daily. 90 tablet 1   • LORazepam (ATIVAN) 0.5 MG tablet Take 1 tablet by mouth Every 8 (Eight) Hours As Needed for Anxiety. Frequency and # increased on 6/24/21. Refill early accordingly. 90 tablet 0   • meclizine (ANTIVERT) 25 MG tablet Take 1 tablet by mouth 3 (Three) Times a Day As Needed for dizziness. 90 tablet 6   • methocarbamol (Robaxin) 500 MG tablet Take 1 tablet by mouth 2 (Two) Times a Day As Needed for Muscle Spasms. 60 tablet 5   • metoclopramide (REGLAN) 10 MG tablet TAKE 1 TABLET BY MOUTH FOUR TIMES A DAY AS NEEDED 120 tablet 1   • metoprolol succinate XL (Toprol XL) 50 MG 24 hr tablet Take 1 tablet by mouth Daily. Dose increased 5/24/21 30 tablet 5   • mirtazapine (Remeron) 30 MG tablet Take 1 tablet by mouth Every Night. 30 tablet 5   • naloxone (NARCAN) 0.4 MG/ML injection Infuse 0.5 mL into a venous catheter Every 5 (Five) Minutes As Needed for Opioid Reversal.     • nitroglycerin (NITROSTAT) 0.4 MG SL tablet Place 1 tablet under the tongue Every 5 (Five) Minutes As Needed for Chest Pain. Take no more than 3 doses in 15 minutes. 20 tablet 11   • NOVOLOG FLEXPEN 100 UNIT/ML solution pen-injector sc pen INJECT 60 UNITS BEFORE MEALS 3 TIMES A  mL 3   • ondansetron (ZOFRAN) 8 MG tablet TAKE 1 TABLET BY MOUTH EVERY 8 (EIGHT) HOURS AS NEEDED FOR NAUSEA OR VOMITING. 18 tablet 1   • pantoprazole (PROTONIX) 20 MG EC tablet TAKE 1 TABLET BY MOUTH EVERY DAY 90 tablet 3   • Syringe/Needle, Disp, (Luer Lock Safety Syringes) 25G X 5/8\" 3 ML misc 1 each Daily. 1 Q28 DAYS 1 each 2   • venlafaxine XR (EFFEXOR-XR) 150 MG 24 hr capsule TAKE 1 CAPSULE BY MOUTH TWICE A DAY 90 capsule 1   • vitamin D (ERGOCALCIFEROL) 1.25 MG (47388 UT) capsule capsule TAKE ONE CAPSULE BY MOUTH EVERY SUNDAY. 12 capsule 2     No current facility-administered medications for this visit.         OBJECTIVE    /76 (BP Location: Left arm, Patient Position: Sitting, Cuff Size: Adult)   Pulse 104   Temp 97.3 °F " "(36.3 °C)   Ht 172.7 cm (67.99\")   Wt 117 kg (258 lb)   SpO2 98%   BMI 39.24 kg/m²         Review of Systems     Constitutional:  Denies recent weight loss, weight gain, fever or chills     HENT:  Denies any hearing loss, epistaxis, hoarseness, or difficulty speaking.     Eyes: Wears eyeglasses or contact lenses     Respiratory:  Denies dyspnea with exertion,no cough, wheezing, or hemoptysis.     Cardiovascular: Negative for palpations, chest pain, orthopnea, PND.  See HPI    Gastrointestinal:  Denies change in bowel habits, dyspepsia, ulcer disease, hematochezia, or melena.     Endocrine: Negative for cold intolerance, heat intolerance, polydipsia, polyphagia and polyuria.     Genitourinary: Negative.      Musculoskeletal: BKA left     Neurological:  Denies any history of recurrent headaches, strokes, TIA, or seizure disorder.     Hematological: Denies any food allergies, seasonal allergies, bleeding disorders, or lymphadenopathy.       Physical Exam     Constitutional: Cooperative, alert and oriented,  in no acute distress.     HENT:   Head: Normocephalic, normal hair patterns, no masses or tenderness.  Ears, Nose, and Throat: No gross abnormalities. No pallor or cyanosis. Dentition good.   Eyes: EOMS intact, PERRL, conjunctivae and lids unremarkable. Fundoscopic exam and visual fields not performed.   Neck: No palpable masses or adenopathy, no thyromegaly, no JVD, carotid pulses are full and equal bilaterally and without  Bruits.     Cardiovascular: Regular rhythm, S1 and S2 normal, no S3 or S4.  No murmurs, gallops, or rubs detected.     Pulmonary/Chest: Chest: normal symmetry, normal respiratory excursion, no intercostal retraction, no use of accessory muscles.            Pulmonary: Normal breath sounds. No rales or ronchi.    Abdominal: Abdomen soft, bowel sounds normoactive, no masses, no hepatosplenomegaly, non-tender, no bruits.     Musculoskeletal: No deformities, clubbing, cyanosis, erythema, or edema " observed.     Neurological: No gross motor or sensory deficits noted, affect appropriate, oriented to time, person, place.     Skin: Warm and dry to the touch, no apparent skin lesions or masses noted.     Psychiatric: She has a normal mood and affect. Her behavior is normal. Judgment and thought content normal.         Procedures      Lab Results   Component Value Date    WBC 11.5 (H) 06/03/2021    HGB 10.4 (L) 06/03/2021    HCT 32.3 (L) 06/03/2021    MCV 90.0 06/03/2021     06/03/2021     Lab Results   Component Value Date    GLUCOSE 193 (H) 05/24/2021    BUN 17 05/24/2021    CREATININE 1.06 (H) 05/24/2021    EGFRIFNONA 53 (L) 05/24/2021    BCR 16.0 05/24/2021    CO2 27.3 05/24/2021    CALCIUM 9.7 05/24/2021    ALBUMIN 4.10 05/17/2021    AST 23 05/17/2021    ALT 34 (H) 05/17/2021     Lab Results   Component Value Date    CHOL 149 03/05/2021    CHOL 180 11/12/2020    CHOL 145 06/10/2020     Lab Results   Component Value Date    TRIG 186 (H) 03/05/2021    TRIG 180 (H) 11/12/2020    TRIG 181 (H) 06/10/2020     Lab Results   Component Value Date    HDL 38 (L) 03/05/2021    HDL 53 11/12/2020    HDL 41 06/10/2020     No components found for: LDLCALC  Lab Results   Component Value Date    LDL 79 03/05/2021    LDL 96 11/12/2020    LDL 68 06/10/2020     No results found for: HDLLDLRATIO  No components found for: CHOLHDL  Lab Results   Component Value Date    HGBA1C 8.4 (H) 03/14/2021     Lab Results   Component Value Date    TSH 5.410 (H) 03/05/2021    B9RYBOG 9.70 08/30/2017    THYROIDAB <6 01/02/2015           ASSESSMENT AND PLAN  Ariana Martinez is a 61-year-old lady with a rather complex medical history as discussed in detail under history of present illness.  Fortunately she is stable from a cardiac standpoint at this time with no clinical evidence of angina or congestive heart failure.  She has been advised to maintain a high fluid intake and watch her diabetes closely.  Last hemoglobin A1c was 8.4.  Compliance  with medication was also stressed.  I have continued antiplatelet therapy with aspirin and Plavix, lipid-lowering therapy with atorvastatin, antihypertensive therapy with metoprolol succinate, lisinopril, HCTZ.  She is on Lasix as well.    45 minutes were spent in the assessment and evaluation of this patient    Diagnoses and all orders for this visit:    1. Uncontrolled hypertension (Primary)    2. Essential hypertension    3. Coronary artery disease involving native coronary artery of native heart without angina pectoris    4. S/P CABG (coronary artery bypass graft)        Patient's Body mass index is 39.24 kg/m². indicating that she is obese (BMI >30). Obesity-related health conditions include the following: hypertension, coronary heart disease, diabetes mellitus and dyslipidemias. Obesity is unchanged. BMI is is above average; no BMI management plan is appropriate. We discussed portion control and increasing exercise..      Ariana Martinez  reports that she has never smoked. She has never used smokeless tobacco..          Bill Gibosn MD  6/30/2021  08:35 CDT

## 2021-07-02 ENCOUNTER — APPOINTMENT (OUTPATIENT)
Dept: GENERAL RADIOLOGY | Facility: HOSPITAL | Age: 61
End: 2021-07-02

## 2021-07-02 ENCOUNTER — APPOINTMENT (OUTPATIENT)
Dept: CT IMAGING | Facility: HOSPITAL | Age: 61
End: 2021-07-02

## 2021-07-02 ENCOUNTER — HOSPITAL ENCOUNTER (INPATIENT)
Facility: HOSPITAL | Age: 61
LOS: 10 days | Discharge: HOME HEALTH | End: 2021-07-12
Attending: EMERGENCY MEDICINE | Admitting: INTERNAL MEDICINE

## 2021-07-02 DIAGNOSIS — Z74.09 IMPAIRED PHYSICAL MOBILITY: ICD-10-CM

## 2021-07-02 DIAGNOSIS — Z74.09 IMPAIRED MOBILITY AND ACTIVITIES OF DAILY LIVING: ICD-10-CM

## 2021-07-02 DIAGNOSIS — K52.9 ACUTE COLITIS: Primary | ICD-10-CM

## 2021-07-02 DIAGNOSIS — N17.9 ACUTE KIDNEY INJURY (HCC): ICD-10-CM

## 2021-07-02 DIAGNOSIS — Z78.9 IMPAIRED MOBILITY AND ACTIVITIES OF DAILY LIVING: ICD-10-CM

## 2021-07-02 LAB
ALBUMIN SERPL-MCNC: 3.5 G/DL (ref 3.5–5.2)
ALBUMIN/GLOB SERPL: 1.1 G/DL
ALP SERPL-CCNC: 129 U/L (ref 39–117)
ALT SERPL W P-5'-P-CCNC: 19 U/L (ref 1–33)
ANION GAP SERPL CALCULATED.3IONS-SCNC: 13 MMOL/L (ref 5–15)
AST SERPL-CCNC: 18 U/L (ref 1–32)
BASOPHILS # BLD AUTO: 0.09 10*3/MM3 (ref 0–0.2)
BASOPHILS NFR BLD AUTO: 0.4 % (ref 0–1.5)
BILIRUB SERPL-MCNC: 0.3 MG/DL (ref 0–1.2)
BUN SERPL-MCNC: 32 MG/DL (ref 6–20)
BUN/CREAT SERPL: 16 (ref 7–25)
CALCIUM SPEC-SCNC: 9.3 MG/DL (ref 8.6–10.5)
CHLORIDE SERPL-SCNC: 95 MMOL/L (ref 98–107)
CO2 SERPL-SCNC: 25 MMOL/L (ref 22–29)
CREAT SERPL-MCNC: 2 MG/DL (ref 0.57–1)
D-LACTATE SERPL-SCNC: 1.5 MMOL/L (ref 0.5–2)
DEPRECATED RDW RBC AUTO: 43.9 FL (ref 37–54)
EOSINOPHIL # BLD AUTO: 0.2 10*3/MM3 (ref 0–0.4)
EOSINOPHIL NFR BLD AUTO: 0.8 % (ref 0.3–6.2)
ERYTHROCYTE [DISTWIDTH] IN BLOOD BY AUTOMATED COUNT: 14.2 % (ref 12.3–15.4)
FLUAV RNA RESP QL NAA+PROBE: NOT DETECTED
FLUBV RNA RESP QL NAA+PROBE: NOT DETECTED
GFR SERPL CREATININE-BSD FRML MDRD: 26 ML/MIN/1.73
GLOBULIN UR ELPH-MCNC: 3.3 GM/DL
GLUCOSE BLDC GLUCOMTR-MCNC: 169 MG/DL (ref 70–130)
GLUCOSE SERPL-MCNC: 241 MG/DL (ref 65–99)
HCT VFR BLD AUTO: 35.2 % (ref 34–46.6)
HGB BLD-MCNC: 11.5 G/DL (ref 12–15.9)
HOLD SPECIMEN: NORMAL
IMM GRANULOCYTES # BLD AUTO: 0.27 10*3/MM3 (ref 0–0.05)
IMM GRANULOCYTES NFR BLD AUTO: 1.1 % (ref 0–0.5)
LIPASE SERPL-CCNC: 14 U/L (ref 13–60)
LYMPHOCYTES # BLD AUTO: 1.83 10*3/MM3 (ref 0.7–3.1)
LYMPHOCYTES NFR BLD AUTO: 7.6 % (ref 19.6–45.3)
MCH RBC QN AUTO: 27.7 PG (ref 26.6–33)
MCHC RBC AUTO-ENTMCNC: 32.7 G/DL (ref 31.5–35.7)
MCV RBC AUTO: 84.8 FL (ref 79–97)
MONOCYTES # BLD AUTO: 1.37 10*3/MM3 (ref 0.1–0.9)
MONOCYTES NFR BLD AUTO: 5.7 % (ref 5–12)
NEUTROPHILS NFR BLD AUTO: 20.45 10*3/MM3 (ref 1.7–7)
NEUTROPHILS NFR BLD AUTO: 84.4 % (ref 42.7–76)
NRBC BLD AUTO-RTO: 0 /100 WBC (ref 0–0.2)
PLATELET # BLD AUTO: 463 10*3/MM3 (ref 140–450)
PMV BLD AUTO: 9.9 FL (ref 6–12)
POTASSIUM SERPL-SCNC: 4 MMOL/L (ref 3.5–5.2)
PROT SERPL-MCNC: 6.8 G/DL (ref 6–8.5)
RBC # BLD AUTO: 4.15 10*6/MM3 (ref 3.77–5.28)
SARS-COV-2 RNA RESP QL NAA+PROBE: NOT DETECTED
SODIUM SERPL-SCNC: 133 MMOL/L (ref 136–145)
WBC # BLD AUTO: 24.21 10*3/MM3 (ref 3.4–10.8)

## 2021-07-02 PROCEDURE — 25010000002 IOPAMIDOL 61 % SOLUTION: Performed by: EMERGENCY MEDICINE

## 2021-07-02 PROCEDURE — 25010000002 PIPERACILLIN SOD-TAZOBACTAM PER 1 G: Performed by: EMERGENCY MEDICINE

## 2021-07-02 PROCEDURE — 82962 GLUCOSE BLOOD TEST: CPT

## 2021-07-02 PROCEDURE — 80053 COMPREHEN METABOLIC PANEL: CPT | Performed by: EMERGENCY MEDICINE

## 2021-07-02 PROCEDURE — 85025 COMPLETE CBC W/AUTO DIFF WBC: CPT | Performed by: EMERGENCY MEDICINE

## 2021-07-02 PROCEDURE — 25010000002 PROCHLORPERAZINE 10 MG/2ML SOLUTION: Performed by: EMERGENCY MEDICINE

## 2021-07-02 PROCEDURE — 83690 ASSAY OF LIPASE: CPT | Performed by: EMERGENCY MEDICINE

## 2021-07-02 PROCEDURE — 71045 X-RAY EXAM CHEST 1 VIEW: CPT

## 2021-07-02 PROCEDURE — 87040 BLOOD CULTURE FOR BACTERIA: CPT | Performed by: EMERGENCY MEDICINE

## 2021-07-02 PROCEDURE — 93005 ELECTROCARDIOGRAM TRACING: CPT | Performed by: EMERGENCY MEDICINE

## 2021-07-02 PROCEDURE — 74177 CT ABD & PELVIS W/CONTRAST: CPT

## 2021-07-02 PROCEDURE — C1751 CATH, INF, PER/CENT/MIDLINE: HCPCS

## 2021-07-02 PROCEDURE — 99284 EMERGENCY DEPT VISIT MOD MDM: CPT

## 2021-07-02 PROCEDURE — 93010 ELECTROCARDIOGRAM REPORT: CPT | Performed by: INTERNAL MEDICINE

## 2021-07-02 PROCEDURE — 83605 ASSAY OF LACTIC ACID: CPT | Performed by: EMERGENCY MEDICINE

## 2021-07-02 PROCEDURE — 87636 SARSCOV2 & INF A&B AMP PRB: CPT | Performed by: EMERGENCY MEDICINE

## 2021-07-02 PROCEDURE — 25010000002 ONDANSETRON PER 1 MG: Performed by: EMERGENCY MEDICINE

## 2021-07-02 RX ORDER — PROCHLORPERAZINE EDISYLATE 5 MG/ML
5 INJECTION INTRAMUSCULAR; INTRAVENOUS ONCE
Status: DISCONTINUED | OUTPATIENT
Start: 2021-07-02 | End: 2021-07-02

## 2021-07-02 RX ORDER — DEXTROSE MONOHYDRATE 25 G/50ML
25 INJECTION, SOLUTION INTRAVENOUS
Status: DISCONTINUED | OUTPATIENT
Start: 2021-07-02 | End: 2021-07-05

## 2021-07-02 RX ORDER — NICOTINE POLACRILEX 4 MG
15 LOZENGE BUCCAL
Status: DISCONTINUED | OUTPATIENT
Start: 2021-07-02 | End: 2021-07-05

## 2021-07-02 RX ORDER — FAMOTIDINE 10 MG/ML
20 INJECTION, SOLUTION INTRAVENOUS DAILY
Status: DISCONTINUED | OUTPATIENT
Start: 2021-07-03 | End: 2021-07-12 | Stop reason: HOSPADM

## 2021-07-02 RX ORDER — PROCHLORPERAZINE EDISYLATE 5 MG/ML
10 INJECTION INTRAMUSCULAR; INTRAVENOUS EVERY 6 HOURS PRN
Status: DISCONTINUED | OUTPATIENT
Start: 2021-07-02 | End: 2021-07-03

## 2021-07-02 RX ORDER — SODIUM CHLORIDE 0.9 % (FLUSH) 0.9 %
10 SYRINGE (ML) INJECTION AS NEEDED
Status: DISCONTINUED | OUTPATIENT
Start: 2021-07-02 | End: 2021-07-12 | Stop reason: HOSPADM

## 2021-07-02 RX ORDER — ONDANSETRON 2 MG/ML
4 INJECTION INTRAMUSCULAR; INTRAVENOUS ONCE
Status: COMPLETED | OUTPATIENT
Start: 2021-07-02 | End: 2021-07-02

## 2021-07-02 RX ORDER — SODIUM CHLORIDE, SODIUM LACTATE, POTASSIUM CHLORIDE, CALCIUM CHLORIDE 600; 310; 30; 20 MG/100ML; MG/100ML; MG/100ML; MG/100ML
50 INJECTION, SOLUTION INTRAVENOUS CONTINUOUS
Status: DISCONTINUED | OUTPATIENT
Start: 2021-07-02 | End: 2021-07-10

## 2021-07-02 RX ADMIN — SODIUM CHLORIDE 1000 ML: 900 INJECTION, SOLUTION INTRAVENOUS at 22:03

## 2021-07-02 RX ADMIN — ONDANSETRON 4 MG: 2 INJECTION INTRAMUSCULAR; INTRAVENOUS at 19:34

## 2021-07-02 RX ADMIN — PROCHLORPERAZINE EDISYLATE 10 MG: 5 INJECTION INTRAMUSCULAR; INTRAVENOUS at 21:00

## 2021-07-02 RX ADMIN — METRONIDAZOLE 500 MG: 500 INJECTION, SOLUTION INTRAVENOUS at 23:09

## 2021-07-02 RX ADMIN — SODIUM CHLORIDE 1000 ML: 900 INJECTION, SOLUTION INTRAVENOUS at 19:37

## 2021-07-02 RX ADMIN — IOPAMIDOL 90 ML: 612 INJECTION, SOLUTION INTRAVENOUS at 22:28

## 2021-07-03 ENCOUNTER — APPOINTMENT (OUTPATIENT)
Dept: ULTRASOUND IMAGING | Facility: HOSPITAL | Age: 61
End: 2021-07-03

## 2021-07-03 LAB
ALBUMIN SERPL-MCNC: 3.6 G/DL (ref 3.5–5.2)
ALBUMIN/GLOB SERPL: 1.1 G/DL
ALP SERPL-CCNC: 139 U/L (ref 39–117)
ALT SERPL W P-5'-P-CCNC: 18 U/L (ref 1–33)
ANION GAP SERPL CALCULATED.3IONS-SCNC: 17 MMOL/L (ref 5–15)
AST SERPL-CCNC: 16 U/L (ref 1–32)
BILIRUB SERPL-MCNC: 0.4 MG/DL (ref 0–1.2)
BILIRUB UR QL STRIP: NEGATIVE
BUN SERPL-MCNC: 34 MG/DL (ref 6–20)
BUN/CREAT SERPL: 16.1 (ref 7–25)
C DIFF TOX GENS STL QL NAA+PROBE: NEGATIVE
CA-I BLD-MCNC: 4.45 MG/DL (ref 4.6–5.6)
CALCIUM SPEC-SCNC: 9.3 MG/DL (ref 8.6–10.5)
CHLORIDE SERPL-SCNC: 93 MMOL/L (ref 98–107)
CHOLEST SERPL-MCNC: 142 MG/DL (ref 0–200)
CLARITY UR: CLEAR
CO2 SERPL-SCNC: 21 MMOL/L (ref 22–29)
COLOR UR: YELLOW
CREAT SERPL-MCNC: 2.11 MG/DL (ref 0.57–1)
DEPRECATED RDW RBC AUTO: 44.4 FL (ref 37–54)
ERYTHROCYTE [DISTWIDTH] IN BLOOD BY AUTOMATED COUNT: 14.1 % (ref 12.3–15.4)
GFR SERPL CREATININE-BSD FRML MDRD: 24 ML/MIN/1.73
GLOBULIN UR ELPH-MCNC: 3.3 GM/DL
GLUCOSE BLDC GLUCOMTR-MCNC: 115 MG/DL (ref 70–130)
GLUCOSE BLDC GLUCOMTR-MCNC: 238 MG/DL (ref 70–130)
GLUCOSE BLDC GLUCOMTR-MCNC: 383 MG/DL (ref 70–130)
GLUCOSE SERPL-MCNC: 392 MG/DL (ref 65–99)
GLUCOSE SERPL-MCNC: 443 MG/DL (ref 65–99)
GLUCOSE UR STRIP-MCNC: NEGATIVE MG/DL
HBA1C MFR BLD: 9.7 % (ref 4.8–5.6)
HCT VFR BLD AUTO: 37.8 % (ref 34–46.6)
HDLC SERPL-MCNC: 41 MG/DL (ref 40–60)
HGB BLD-MCNC: 12.5 G/DL (ref 12–15.9)
HGB UR QL STRIP.AUTO: NEGATIVE
KETONES UR QL STRIP: NEGATIVE
LDLC SERPL CALC-MCNC: 84 MG/DL (ref 0–100)
LDLC/HDLC SERPL: 2.04 {RATIO}
LEUKOCYTE ESTERASE UR QL STRIP.AUTO: NEGATIVE
LIPASE SERPL-CCNC: 11 U/L (ref 13–60)
MAGNESIUM SERPL-MCNC: 1.6 MG/DL (ref 1.6–2.6)
MCH RBC QN AUTO: 28.9 PG (ref 26.6–33)
MCHC RBC AUTO-ENTMCNC: 33.1 G/DL (ref 31.5–35.7)
MCV RBC AUTO: 87.3 FL (ref 79–97)
NITRITE UR QL STRIP: NEGATIVE
NT-PROBNP SERPL-MCNC: 248.8 PG/ML (ref 0–900)
PH UR STRIP.AUTO: 5.5 [PH] (ref 5–9)
PLATELET # BLD AUTO: 447 10*3/MM3 (ref 140–450)
PMV BLD AUTO: 10.3 FL (ref 6–12)
POTASSIUM SERPL-SCNC: 4.4 MMOL/L (ref 3.5–5.2)
PROCALCITONIN SERPL-MCNC: 0.1 NG/ML (ref 0–0.25)
PROT SERPL-MCNC: 6.9 G/DL (ref 6–8.5)
PROT UR QL STRIP: NEGATIVE
RBC # BLD AUTO: 4.33 10*6/MM3 (ref 3.77–5.28)
SODIUM SERPL-SCNC: 131 MMOL/L (ref 136–145)
SP GR UR STRIP: 1.01 (ref 1–1.03)
T4 FREE SERPL-MCNC: 1.22 NG/DL (ref 0.93–1.7)
TRIGL SERPL-MCNC: 87 MG/DL (ref 0–150)
TROPONIN T SERPL-MCNC: <0.01 NG/ML (ref 0–0.03)
TSH SERPL DL<=0.05 MIU/L-ACNC: 3.54 UIU/ML (ref 0.27–4.2)
UROBILINOGEN UR QL STRIP: NORMAL
VLDLC SERPL-MCNC: 17 MG/DL (ref 5–40)
WBC # BLD AUTO: 28.39 10*3/MM3 (ref 3.4–10.8)

## 2021-07-03 PROCEDURE — 83690 ASSAY OF LIPASE: CPT | Performed by: INTERNAL MEDICINE

## 2021-07-03 PROCEDURE — 25010000002 ALBUMIN HUMAN 5% PER 50 ML: Performed by: INTERNAL MEDICINE

## 2021-07-03 PROCEDURE — 82962 GLUCOSE BLOOD TEST: CPT

## 2021-07-03 PROCEDURE — 84145 PROCALCITONIN (PCT): CPT | Performed by: INTERNAL MEDICINE

## 2021-07-03 PROCEDURE — 83880 ASSAY OF NATRIURETIC PEPTIDE: CPT | Performed by: INTERNAL MEDICINE

## 2021-07-03 PROCEDURE — P9047 ALBUMIN (HUMAN), 25%, 50ML: HCPCS | Performed by: INTERNAL MEDICINE

## 2021-07-03 PROCEDURE — 63710000001 INSULIN ASPART PER 5 UNITS: Performed by: INTERNAL MEDICINE

## 2021-07-03 PROCEDURE — 84443 ASSAY THYROID STIM HORMONE: CPT | Performed by: INTERNAL MEDICINE

## 2021-07-03 PROCEDURE — 25010000002 ONDANSETRON PER 1 MG: Performed by: INTERNAL MEDICINE

## 2021-07-03 PROCEDURE — 85027 COMPLETE CBC AUTOMATED: CPT | Performed by: INTERNAL MEDICINE

## 2021-07-03 PROCEDURE — 25010000002 PIPERACILLIN SOD-TAZOBACTAM PER 1 G: Performed by: INTERNAL MEDICINE

## 2021-07-03 PROCEDURE — 25010000002 PROCHLORPERAZINE 10 MG/2ML SOLUTION: Performed by: INTERNAL MEDICINE

## 2021-07-03 PROCEDURE — 83735 ASSAY OF MAGNESIUM: CPT | Performed by: INTERNAL MEDICINE

## 2021-07-03 PROCEDURE — 87493 C DIFF AMPLIFIED PROBE: CPT | Performed by: INTERNAL MEDICINE

## 2021-07-03 PROCEDURE — 25010000002 HYDROMORPHONE 1 MG/ML SOLUTION: Performed by: INTERNAL MEDICINE

## 2021-07-03 PROCEDURE — 81003 URINALYSIS AUTO W/O SCOPE: CPT | Performed by: INTERNAL MEDICINE

## 2021-07-03 PROCEDURE — 36415 COLL VENOUS BLD VENIPUNCTURE: CPT | Performed by: INTERNAL MEDICINE

## 2021-07-03 PROCEDURE — 63710000001 INSULIN DETEMIR PER 5 UNITS: Performed by: INTERNAL MEDICINE

## 2021-07-03 PROCEDURE — 84484 ASSAY OF TROPONIN QUANT: CPT | Performed by: INTERNAL MEDICINE

## 2021-07-03 PROCEDURE — 82947 ASSAY GLUCOSE BLOOD QUANT: CPT | Performed by: INTERNAL MEDICINE

## 2021-07-03 PROCEDURE — P9041 ALBUMIN (HUMAN),5%, 50ML: HCPCS | Performed by: INTERNAL MEDICINE

## 2021-07-03 PROCEDURE — 80053 COMPREHEN METABOLIC PANEL: CPT | Performed by: INTERNAL MEDICINE

## 2021-07-03 PROCEDURE — 83036 HEMOGLOBIN GLYCOSYLATED A1C: CPT | Performed by: INTERNAL MEDICINE

## 2021-07-03 PROCEDURE — 82330 ASSAY OF CALCIUM: CPT | Performed by: INTERNAL MEDICINE

## 2021-07-03 PROCEDURE — 25010000002 ALBUMIN HUMAN 25% PER 50 ML: Performed by: INTERNAL MEDICINE

## 2021-07-03 PROCEDURE — 25010000002 ENOXAPARIN PER 10 MG: Performed by: INTERNAL MEDICINE

## 2021-07-03 PROCEDURE — 80061 LIPID PANEL: CPT | Performed by: INTERNAL MEDICINE

## 2021-07-03 PROCEDURE — 84439 ASSAY OF FREE THYROXINE: CPT | Performed by: INTERNAL MEDICINE

## 2021-07-03 RX ORDER — SODIUM CHLORIDE 0.9 % (FLUSH) 0.9 %
10 SYRINGE (ML) INJECTION EVERY 12 HOURS SCHEDULED
Status: DISCONTINUED | OUTPATIENT
Start: 2021-07-03 | End: 2021-07-12 | Stop reason: HOSPADM

## 2021-07-03 RX ORDER — ARIPIPRAZOLE 15 MG/1
15 TABLET ORAL DAILY
Status: DISCONTINUED | OUTPATIENT
Start: 2021-07-04 | End: 2021-07-12 | Stop reason: HOSPADM

## 2021-07-03 RX ORDER — CLOPIDOGREL BISULFATE 75 MG/1
75 TABLET ORAL DAILY
Status: DISCONTINUED | OUTPATIENT
Start: 2021-07-03 | End: 2021-07-12 | Stop reason: HOSPADM

## 2021-07-03 RX ORDER — ACETAMINOPHEN 325 MG/1
650 TABLET ORAL EVERY 6 HOURS PRN
Status: DISCONTINUED | OUTPATIENT
Start: 2021-07-03 | End: 2021-07-12 | Stop reason: HOSPADM

## 2021-07-03 RX ORDER — PROCHLORPERAZINE EDISYLATE 5 MG/ML
5 INJECTION INTRAMUSCULAR; INTRAVENOUS EVERY 6 HOURS PRN
Status: DISCONTINUED | OUTPATIENT
Start: 2021-07-03 | End: 2021-07-12 | Stop reason: HOSPADM

## 2021-07-03 RX ORDER — SODIUM CHLORIDE 0.9 % (FLUSH) 0.9 %
10 SYRINGE (ML) INJECTION AS NEEDED
Status: DISCONTINUED | OUTPATIENT
Start: 2021-07-03 | End: 2021-07-12 | Stop reason: HOSPADM

## 2021-07-03 RX ORDER — ALBUMIN, HUMAN INJ 5% 5 %
12.5 SOLUTION INTRAVENOUS ONCE
Status: COMPLETED | OUTPATIENT
Start: 2021-07-03 | End: 2021-07-03

## 2021-07-03 RX ORDER — TAMSULOSIN HYDROCHLORIDE 0.4 MG/1
0.4 CAPSULE ORAL DAILY
Status: DISCONTINUED | OUTPATIENT
Start: 2021-07-04 | End: 2021-07-12 | Stop reason: HOSPADM

## 2021-07-03 RX ORDER — ONDANSETRON 2 MG/ML
4 INJECTION INTRAMUSCULAR; INTRAVENOUS EVERY 6 HOURS PRN
Status: DISCONTINUED | OUTPATIENT
Start: 2021-07-03 | End: 2021-07-12 | Stop reason: HOSPADM

## 2021-07-03 RX ORDER — ATORVASTATIN CALCIUM 40 MG/1
80 TABLET, FILM COATED ORAL DAILY
Status: DISCONTINUED | OUTPATIENT
Start: 2021-07-03 | End: 2021-07-12 | Stop reason: HOSPADM

## 2021-07-03 RX ORDER — ASPIRIN 81 MG/1
81 TABLET ORAL DAILY
Status: DISCONTINUED | OUTPATIENT
Start: 2021-07-03 | End: 2021-07-12 | Stop reason: HOSPADM

## 2021-07-03 RX ORDER — SODIUM CHLORIDE 0.9 % (FLUSH) 0.9 %
20 SYRINGE (ML) INJECTION AS NEEDED
Status: DISCONTINUED | OUTPATIENT
Start: 2021-07-03 | End: 2021-07-12 | Stop reason: HOSPADM

## 2021-07-03 RX ORDER — GABAPENTIN 300 MG/1
300 CAPSULE ORAL EVERY 8 HOURS SCHEDULED
Status: DISCONTINUED | OUTPATIENT
Start: 2021-07-03 | End: 2021-07-10 | Stop reason: DRUGHIGH

## 2021-07-03 RX ORDER — NOREPINEPHRINE BIT/0.9 % NACL 8 MG/250ML
.02-.3 INFUSION BOTTLE (ML) INTRAVENOUS
Status: DISCONTINUED | OUTPATIENT
Start: 2021-07-03 | End: 2021-07-08

## 2021-07-03 RX ORDER — LORAZEPAM 0.5 MG/1
0.5 TABLET ORAL EVERY 8 HOURS PRN
Status: DISPENSED | OUTPATIENT
Start: 2021-07-03 | End: 2021-07-10

## 2021-07-03 RX ORDER — FOLIC ACID 1 MG/1
1 TABLET ORAL DAILY
Status: DISCONTINUED | OUTPATIENT
Start: 2021-07-03 | End: 2021-07-12 | Stop reason: HOSPADM

## 2021-07-03 RX ORDER — METOPROLOL SUCCINATE 50 MG/1
50 TABLET, EXTENDED RELEASE ORAL DAILY
Status: DISCONTINUED | OUTPATIENT
Start: 2021-07-03 | End: 2021-07-03

## 2021-07-03 RX ORDER — ALBUMIN (HUMAN) 12.5 G/50ML
25 SOLUTION INTRAVENOUS ONCE
Status: COMPLETED | OUTPATIENT
Start: 2021-07-03 | End: 2021-07-03

## 2021-07-03 RX ORDER — VENLAFAXINE HYDROCHLORIDE 75 MG/1
150 CAPSULE, EXTENDED RELEASE ORAL
Status: DISCONTINUED | OUTPATIENT
Start: 2021-07-03 | End: 2021-07-12 | Stop reason: HOSPADM

## 2021-07-03 RX ORDER — SODIUM CHLORIDE 0.9 % (FLUSH) 0.9 %
10 SYRINGE (ML) INJECTION EVERY 12 HOURS SCHEDULED
Status: DISCONTINUED | OUTPATIENT
Start: 2021-07-03 | End: 2021-07-10

## 2021-07-03 RX ADMIN — ONDANSETRON HYDROCHLORIDE 4 MG: 2 INJECTION, SOLUTION INTRAMUSCULAR; INTRAVENOUS at 01:54

## 2021-07-03 RX ADMIN — ASPIRIN 81 MG: 81 TABLET, FILM COATED ORAL at 08:43

## 2021-07-03 RX ADMIN — FOLIC ACID 1 MG: 1 TABLET ORAL at 08:43

## 2021-07-03 RX ADMIN — PIPERACILLIN SODIUM AND TAZOBACTAM SODIUM 3.38 G: 3; .375 INJECTION, POWDER, LYOPHILIZED, FOR SOLUTION INTRAVENOUS at 08:41

## 2021-07-03 RX ADMIN — SODIUM CHLORIDE, PRESERVATIVE FREE 10 ML: 5 INJECTION INTRAVENOUS at 08:44

## 2021-07-03 RX ADMIN — METRONIDAZOLE 500 MG: 500 INJECTION, SOLUTION INTRAVENOUS at 08:41

## 2021-07-03 RX ADMIN — SODIUM CHLORIDE, PRESERVATIVE FREE 10 ML: 5 INJECTION INTRAVENOUS at 02:18

## 2021-07-03 RX ADMIN — PIPERACILLIN SODIUM AND TAZOBACTAM SODIUM 3.38 G: 3; .375 INJECTION, POWDER, LYOPHILIZED, FOR SOLUTION INTRAVENOUS at 14:06

## 2021-07-03 RX ADMIN — HYDROMORPHONE HYDROCHLORIDE 0.5 MG: 1 INJECTION, SOLUTION INTRAMUSCULAR; INTRAVENOUS; SUBCUTANEOUS at 06:42

## 2021-07-03 RX ADMIN — GABAPENTIN 300 MG: 300 CAPSULE ORAL at 22:52

## 2021-07-03 RX ADMIN — INSULIN ASPART 9 UNITS: 100 INJECTION, SOLUTION INTRAVENOUS; SUBCUTANEOUS at 09:51

## 2021-07-03 RX ADMIN — FAMOTIDINE 20 MG: 10 INJECTION INTRAVENOUS at 08:43

## 2021-07-03 RX ADMIN — INSULIN ASPART 40 UNITS: 100 INJECTION, SOLUTION INTRAVENOUS; SUBCUTANEOUS at 17:02

## 2021-07-03 RX ADMIN — SODIUM CHLORIDE, POTASSIUM CHLORIDE, SODIUM LACTATE AND CALCIUM CHLORIDE 125 ML/HR: 600; 310; 30; 20 INJECTION, SOLUTION INTRAVENOUS at 00:48

## 2021-07-03 RX ADMIN — PROCHLORPERAZINE EDISYLATE 5 MG: 5 INJECTION INTRAMUSCULAR; INTRAVENOUS at 16:57

## 2021-07-03 RX ADMIN — ALBUMIN HUMAN 25 G: 0.25 SOLUTION INTRAVENOUS at 14:40

## 2021-07-03 RX ADMIN — ACETAMINOPHEN 650 MG: 325 TABLET, FILM COATED ORAL at 14:05

## 2021-07-03 RX ADMIN — VENLAFAXINE HYDROCHLORIDE 150 MG: 75 CAPSULE, EXTENDED RELEASE ORAL at 16:58

## 2021-07-03 RX ADMIN — ONDANSETRON HYDROCHLORIDE 4 MG: 2 INJECTION, SOLUTION INTRAMUSCULAR; INTRAVENOUS at 08:42

## 2021-07-03 RX ADMIN — ALBUMIN HUMAN 12.5 G: 0.05 INJECTION, SOLUTION INTRAVENOUS at 09:03

## 2021-07-03 RX ADMIN — Medication 0.02 MCG/KG/MIN: at 12:06

## 2021-07-03 RX ADMIN — SODIUM CHLORIDE, PRESERVATIVE FREE 10 ML: 5 INJECTION INTRAVENOUS at 20:39

## 2021-07-03 RX ADMIN — GABAPENTIN 300 MG: 300 CAPSULE ORAL at 16:57

## 2021-07-03 RX ADMIN — ENOXAPARIN SODIUM 40 MG: 40 INJECTION SUBCUTANEOUS at 09:03

## 2021-07-03 RX ADMIN — INSULIN ASPART 20 UNITS: 100 INJECTION, SOLUTION INTRAVENOUS; SUBCUTANEOUS at 10:00

## 2021-07-03 RX ADMIN — SODIUM CHLORIDE, POTASSIUM CHLORIDE, SODIUM LACTATE AND CALCIUM CHLORIDE 125 ML/HR: 600; 310; 30; 20 INJECTION, SOLUTION INTRAVENOUS at 09:03

## 2021-07-03 RX ADMIN — INSULIN ASPART 20 UNITS: 100 INJECTION, SOLUTION INTRAVENOUS; SUBCUTANEOUS at 11:26

## 2021-07-03 RX ADMIN — ATORVASTATIN CALCIUM 80 MG: 40 TABLET, FILM COATED ORAL at 08:43

## 2021-07-03 RX ADMIN — ONDANSETRON HYDROCHLORIDE 4 MG: 2 INJECTION, SOLUTION INTRAMUSCULAR; INTRAVENOUS at 15:39

## 2021-07-03 RX ADMIN — CLOPIDOGREL BISULFATE 75 MG: 75 TABLET ORAL at 08:43

## 2021-07-03 RX ADMIN — ONDANSETRON HYDROCHLORIDE 4 MG: 2 INJECTION, SOLUTION INTRAMUSCULAR; INTRAVENOUS at 21:49

## 2021-07-03 RX ADMIN — INSULIN DETEMIR 30 UNITS: 100 INJECTION, SOLUTION SUBCUTANEOUS at 11:26

## 2021-07-03 RX ADMIN — PIPERACILLIN SODIUM AND TAZOBACTAM SODIUM 3.38 G: 3; .375 INJECTION, POWDER, LYOPHILIZED, FOR SOLUTION INTRAVENOUS at 22:52

## 2021-07-03 RX ADMIN — SODIUM CHLORIDE, POTASSIUM CHLORIDE, SODIUM LACTATE AND CALCIUM CHLORIDE 150 ML/HR: 600; 310; 30; 20 INJECTION, SOLUTION INTRAVENOUS at 17:10

## 2021-07-03 NOTE — ED NOTES
Patient moved to room 6 for closer observation. Dr Martinez at bedside to place central line due to inability to obtain PIV.      Franci John, RN  07/02/21 2006

## 2021-07-03 NOTE — CONSULTS
Adult Nutrition  Assessment    Patient Name:  Ariana Martinez  YOB: 1960  MRN: [id number]  Admit Date:  7/2/2021    Assessment Date:  7/3/2021    Comments:  Pt admitted with Severe Sepsis possibly due to colitis with abd pain, N/v.  She was found to be in renal failure with elevated Bun and creat.  She has a hx of wt loss and per pt is was related to recent illness with infection and BKA.  She reports eating well prior to the onset of her illness.  She is on Levophed for hypotension and IVF.  HgbA1c elevated at 9.7--indicating uncontrolled DM.  Will assess educational needs prior to discharge.  Will monitor.    Reason for Assessment     Row Name 07/03/21 1319          Reason for Assessment    Reason For Assessment  identified at risk by screening criteria     Diagnosis  gastrointestinal disease;infection/sepsis     Identified At Risk by Screening Criteria  MST SCORE 2+         Nutrition/Diet History     Row Name 07/03/21 1325          Nutrition/Diet History    Typical Food/Fluid Intake  Pt reports that she still feels very bad.  Eating well up until 2 days pta.  +wt loss due to infection and then amputation of her foot.           Labs/Tests/Procedures/Meds     Row Name 07/03/21 1327          Labs/Procedures/Meds    Lab Results Reviewed  reviewed, pertinent     Lab Results Comments  Na 131; Gluc 392; Bun 34; creat 2.11; HgbA1c 9.70        Diagnostic Tests/Procedures    Diagnostic Test/Procedure Reviewed  reviewed, pertinent     Diagnostic Test/Procedures Comments  IVF; Levophed;        Medications    Pertinent Medications Reviewed  reviewed, pertinent     Pertinent Medications Comments  Folic Acid; SSI; LR 125cc/hr; Levophed         Physical Findings     Row Name 07/03/21 1331          Physical Findings    Overall Physical Appearance  on oxygen therapy;amputee     Gastrointestinal  nausea;vomiting;other (see comments) Abd pain         Estimated/Assessed Needs     Row Name 07/03/21 1203           Calculation Measurements    Weight Used For Calculations  59 kg (130 lb) adjust IBW        Estimated/Assessed Needs    Additional Documentation  Additional Requirements (Group);Fluid Requirements (Group);[place]-St[place] Jeor Equation (Group);Protein Requirements (Group);Calorie Requirements (Group);KCAL/KG (Group)        Calorie Requirements    Estimated Calorie Need Method  [place]-St Jeor        [place]-St. Jeor Equation    RMR ([place]-St. Jeor Equation)  1213.18     [place]. Jeor Activity Factors  1.4 - 1.5     Activity Factors ([place]-St. Jeor)  1698.452 - 1819.77        Protein Requirements    Weight Used For Protein Calculations  59 kg (130 lb)     Est Protein Requirement Amount (gms/kg)  1.2 gm protein     Estimated Protein Requirements (gms/day)  70.76        Fluid Requirements    Fluid Requirements (mL/day)  1700     Estimated Fluid Requirement Method  RDA Method     RDA Method (mL)  1700         Nutrition Prescription Ordered     Row Name 07/03/21 1335          Nutrition Prescription PO    Current PO Diet  Clear Liquid     Fluid Consistency  Thin                 Electronically signed by:  Lisa French RD  07/03/21 13:40 CDT

## 2021-07-03 NOTE — ED PROVIDER NOTES
Subjective   61-year-old white female with a history of multiple medical problems presents to the emergency department with chief complaint of abdominal pain.  Patient complains of bilateral lower abdominal pain for 2 days.  She relates the pain radiates to her lower back.  She describes the pain as crampy.  She rates the pain as 10 out of 10 severity.  Associated symptoms include nausea and vomiting.          Review of Systems   Constitutional: Negative for chills, diaphoresis and fever.   Respiratory: Positive for cough. Negative for shortness of breath.    Cardiovascular: Positive for chest pain.   Gastrointestinal: Positive for abdominal pain, nausea and vomiting. Negative for blood in stool.   Genitourinary: Negative for dysuria and hematuria.   Musculoskeletal: Positive for back pain. Negative for neck pain.   Neurological: Positive for light-headedness. Negative for syncope, weakness and headaches.   All other systems reviewed and are negative.      Past Medical History:   Diagnosis Date   • Angina, class IV (CMS/[facility])    • Anxiety    • Anxiety and depression    • Arthritis    • Benign paroxysmal positional vertigo    • Bladder disorder     has bladder stimulator   • Carpal tunnel syndrome    • CHF (congestive heart failure) (CMS/[facility])    • Chronic pain    • Coronary atherosclerosis     hx CABG 2005.  is followed by Dr Kwon   • Depression    • Diabetes mellitus (CMS/[facility])     Type 2, controlled   • Diabetic polyneuropathy (CMS/[facility])    • Disease of thyroid gland    • Elevated cholesterol    • Female stress incontinence    • Foot pain, left    • Full dentures    • Gastroparesis    • GERD (gastroesophageal reflux disease)    • Hyperlipidemia    • Hypertension    • Low back pain    • Malaise and fatigue    • Multiple joint pain    • Obesity     Refuses to be weighed   • Occlusion and stenosis of bilateral carotid arteries 6/18/2021   • Otalgia     Both   • Perforation of tympanic membrane     Left   •  Postoperative wound infection    • Vitamin D deficiency    • Wears glasses     reading       Allergies   Allergen Reactions   • Seroquel [Quetiapine] Anaphylaxis   • Avandia [Rosiglitazone] Swelling   • Morphine And Related Hallucinations   • Oxycodone Hallucinations       Past Surgical History:   Procedure Laterality Date   • ABDOMINAL SURGERY     • AMPUTATION FOOT     • ANGIOPLASTY      coronary   • ANKLE ARTHROSCOPY Left 2/26/2021    Procedure: Left foot hardware removal, ankle arthroscopy, bone grafting , left foot exostectomy;  Surgeon: Ignacio Lord DPM;  Location: [facility] OR;  Service: Podiatry;  Laterality: Left;   • BREAST BIOPSY Right    • CARDIAC CATHETERIZATION     • CARDIAC CATHETERIZATION N/A 6/20/2017    Procedure: Right Heart Cath;  Surgeon: Can Kwon MD PhD;  Location: [facility] CATH INVASIVE LOCATION;  Service:    • CARDIAC CATHETERIZATION N/A 2/18/2020    Procedure: Left Heart Cath;  Surgeon: Catalina Cooper MD;  Location: [facility] CATH INVASIVE LOCATION;  Service: Cardiology;  Laterality: N/A;   • CARPAL TUNNEL RELEASE     • CHOLECYSTECTOMY     • COLONOSCOPY N/A 6/24/2020    Procedure: COLONOSCOPY;  Surgeon: Julián Maldonado MD;  Location: [facility] ENDOSCOPY;  Service: Gastroenterology;  Laterality: N/A;   • CORONARY ARTERY BYPASS GRAFT  2005   • ENDOSCOPY N/A 10/19/2018    Procedure: ESOPHAGOGASTRODUODENOSCOPY possible dilation;  Surgeon: Julián Maldonado MD;  Location: [facility] ENDOSCOPY;  Service: Gastroenterology   • ENDOSCOPY N/A 6/24/2020    Procedure: ESOPHAGOGASTRODUODENOSCOPY WED appt please;  Surgeon: Julián Maldonado MD;  Location: [facility] ENDOSCOPY;  Service: Gastroenterology;  Laterality: N/A;   • ENDOSCOPY AND COLONOSCOPY     • FOOT SURGERY      Toes   • FOOT SURGERY     • GASTRIC BANDING      Revision, laparoscopic   • HYSTERECTOMY     • INCISION AND DRAINAGE LEG Left 3/12/2021    Procedure: Left ankle arthroscopic irrigation and debridement, screw removal;  Surgeon:  Ignacio Lord DPM;  Location: [facility];  Service: Podiatry;  Laterality: Left;   • MOUTH SURGERY     • SALPINGO OOPHORECTOMY     • SHOULDER SURGERY     • SUBTALAR ARTHRODESIS Left 1/16/2019    Procedure: LEFT FOOT HARDWARE REMOVAL, FIFTH METATARSAL , OPEN REDUCTION INTERNAL FIXATION, CALCANEAL OSTEOTOMY;  Surgeon: Ignacio Lord DPM;  Location: [facility];  Service: Podiatry   • SUBTALAR ARTHRODESIS Left 10/16/2019    Procedure: foot hardware removal, subtalar joint fusion  possible de/reattachment of achilles tendon        (c-arm);  Surgeon: Ignacio Lord DPM;  Location: [facility];  Service: Podiatry   • SUBTALAR ARTHRODESIS Left 9/30/2020    Procedure: subtalar, talonavicular joint arthrodesis.  Removal hardware.          (c-arm);  Surgeon: Ignacio Lord DPM;  Location: [facility];  Service: Podiatry;  Laterality: Left;   • TRANSESOPHAGEAL ECHOCARDIOGRAM (LAMONTE)      With color flow       Family History   Problem Relation Age of Onset   • Diabetes Other    • Heart disease Other    • Hypertension Other    • Heart disease Mother    • Stroke Mother    • Hypertension Mother    • Diabetes Sister    • Heart disease Sister    • Hypertension Sister    • Heart disease Sister    • Diabetes Sister    • Hypertension Sister    • Diabetes Sister    • Diabetes Sister    • Diabetes Sister    • Diabetes Sister        Social History     Socioeconomic History   • Marital status:      Spouse name: Not on file   • Number of children: Not on file   • Years of education: Not on file   • Highest education level: Not on file   Tobacco Use   • Smoking status: Never Smoker   • Smokeless tobacco: Never Used   Vaping Use   • Vaping Use: Never used   Substance and Sexual Activity   • Alcohol use: No   • Drug use: No   • Sexual activity: Defer           Objective   Physical Exam  Vitals and nursing note reviewed.   Constitutional:       General: She is not in acute distress.     Appearance: She is not toxic-appearing  or diaphoretic.   HENT:      Head: Normocephalic and atraumatic.      Right Ear: External ear normal.      Left Ear: External ear normal.      Nose: Nose normal.      Mouth/Throat:      Mouth: Mucous membranes are moist.      Pharynx: Oropharynx is clear.   Eyes:      Extraocular Movements: Extraocular movements intact.      Conjunctiva/sclera: Conjunctivae normal.      Pupils: Pupils are equal, round, and reactive to light.   Cardiovascular:      Rate and Rhythm: Normal rate and regular rhythm.      Pulses: Normal pulses.      Heart sounds: Normal heart sounds.   Pulmonary:      Effort: Pulmonary effort is normal.      Breath sounds: Normal breath sounds.   Abdominal:      General: Bowel sounds are normal. There is no distension.      Palpations: Abdomen is soft.      Tenderness: There is generalized abdominal tenderness. There is no guarding or rebound.   Musculoskeletal:      Cervical back: Normal range of motion and neck supple.      Comments: Status post left BKA.   Skin:     General: Skin is warm and dry.   Neurological:      General: No focal deficit present.      Mental Status: She is alert and oriented to person, place, and time.   Psychiatric:         Mood and Affect: Mood normal.         Behavior: Behavior normal.         ECG 12 Lead      Date/Time: 7/2/2021 7:27 PM  Performed by: Froilan Martinez MD  Authorized by: Froilan Martinez MD   Interpreted by physician  Clinical impression: non-specific ECG  Comments: Normal sinus rhythm rate of 89.  Baseline artifact.  No ST elevation.  Nonspecific findings.    Central Line At Bedside    Date/Time: 7/2/2021 8:05 PM  Performed by: Froilan Martinez MD  Authorized by: Froilan Martinez MD     Consent:     Consent obtained:  Verbal    Consent given by:  Patient    Risks discussed:  Arterial puncture, incorrect placement, nerve damage, bleeding, infection and pneumothorax  Pre-procedure details:     Hand hygiene: Hand hygiene performed prior to insertion      Sterile barrier  technique: All elements of maximal sterile technique followed      Skin preparation:  ChloraPrep    Skin preparation agent: Skin preparation agent completely dried prior to procedure    Anesthesia (see MAR for exact dosages):     Anesthesia method:  Local infiltration    Local anesthetic:  Lidocaine 1% w/o epi  Procedure details:     Location:  R subclavian    Patient position:  Trendelenburg    Landmarks identified: yes      Number of attempts:  1    Successful placement: no    Post-procedure details:     Patient tolerance of procedure:  Tolerated well, no immediate complications  Comments:      Attempted right subclavian central venous catheter.  Unable to cannulate the subclavian vein under the clavicle.  Therefore I requested Dr. Johnson to perform ultrasound guided right internal jugular central venous catheter.  Central Line At Bedside    Date/Time: 7/2/2021 9:29 PM  Performed by: Addi Johnson MD  Authorized by: Froilan Martinez MD     Consent:     Consent obtained:  Verbal    Consent given by:  Patient    Risks discussed:  Arterial puncture, bleeding, infection, incorrect placement and pneumothorax  Pre-procedure details:     Hand hygiene: Hand hygiene performed prior to insertion      Sterile barrier technique: All elements of maximal sterile technique followed      Skin preparation:  ChloraPrep  Anesthesia (see MAR for exact dosages):     Anesthesia method:  Local infiltration    Local anesthetic:  Lidocaine 1% w/o epi  Procedure details:     Location:  L internal jugular    Patient position:  Flat    Procedural supplies:  Triple lumen    Catheter size:  7.5 Fr    Landmarks identified: yes      Ultrasound guidance: yes      Sterile ultrasound techniques: Sterile gel and sterile probe covers were used      Number of attempts:  1    Successful placement: yes    Post-procedure details:     Post-procedure:  Dressing applied and line sutured    Assessment:  Blood return through all ports, free fluid flow,  placement verified by x-ray and no pneumothorax on x-ray    Patient tolerance of procedure:  Tolerated well, no immediate complications               ED Course  ED Course as of Jul 02 2305 Fri Jul 02, 2021 2302 Patient is alert and in no acute distress.  I reviewed the results of her evaluation with her.  I recommended admission to the hospital for treatment with IV fluids and antibiotics.  Case discussed with the hospitalist Dr. Behroozi.  He agrees to admit the patient to the stepdown unit.    [DR]      ED Course User Index  [DR] Froilan Martinez MD           Labs Reviewed   COMPREHENSIVE METABOLIC PANEL - Abnormal; Notable for the following components:       Result Value    Glucose 241 (*)     BUN 32 (*)     Creatinine 2.00 (*)     Sodium 133 (*)     Chloride 95 (*)     Alkaline Phosphatase 129 (*)     eGFR Non  Amer 26 (*)     All other components within normal limits    Narrative:     GFR Normal >60  Chronic Kidney Disease <60  Kidney Failure <15     CBC WITH AUTO DIFFERENTIAL - Abnormal; Notable for the following components:    WBC 24.21 (*)     Hemoglobin 11.5 (*)     Platelets 463 (*)     Neutrophil % 84.4 (*)     Lymphocyte % 7.6 (*)     Immature Grans % 1.1 (*)     Neutrophils, Absolute 20.45 (*)     Monocytes, Absolute 1.37 (*)     Immature Grans, Absolute 0.27 (*)     All other components within normal limits   POCT GLUCOSE FINGERSTICK - Abnormal; Notable for the following components:    Glucose 169 (*)     All other components within normal limits   LIPASE - Normal   LACTIC ACID, PLASMA - Normal   BLOOD CULTURE   BLOOD CULTURE   URINALYSIS W/ MICROSCOPIC IF INDICATED (NO CULTURE)   CBC AND DIFFERENTIAL    Narrative:     The following orders were created for panel order CBC & Differential.  Procedure                               Abnormality         Status                     ---------                               -----------         ------                     CBC Auto Differential[[id number]]         Abnormal            Final result                 Please view results for these tests on the individual orders.   EXTRA TUBES    Narrative:     The following orders were created for panel order Extra Tubes.  Procedure                               Abnormality         Status                     ---------                               -----------         ------                     [facility][[id number]]                                   In process                   Please view results for these tests on the individual orders.   [facility]     CT Abdomen Pelvis With Contrast    Result Date: 7/2/2021  Narrative: CT ABDOMEN PELVIS WITH IV CONTRAST INDICATION: 61 years Female; abdominal pain TECHNIQUE:  CT scan of the abdomen and pelvis was performed with IV contrast.  This exam was performed according to our departmental dose-optimization program, which includes automated exposure control, adjustment of the mA and/or kV according to patient size and/or use of iterative reconstruction technique. Comparison: 11/27/2017. FINDINGS: Liver: Unremarkable. Gallbladder/Biliary tree: Status post cholecystectomy. No significant biliary dilatation. Pneumobilia is probably within normal limits for previous sphincterotomy. Pancreas: Fatty atrophy of the pancreas. Spleen: Unremarkable. Adrenals: Unremarkable. Genitourinary: No hydronephrosis or nephrolithiasis. No bladder wall thickening. Status post hysterectomy. No adnexal masses. Gastrointestinal: No gastric wall thickening. Mild circumferential wall thickening of the transverse colon, splenic flexure, descending colon with scattered air-fluid levels without significant surrounding fat stranding/edema probably represents mild infectious colitis or inflammatory bowel disease. No free air or free fluid. Status post appendectomy. Peritoneum: Unremarkable. Vasculature: Mild atherosclerosis of the aorta and iliac arteries including the mesenteric branches. Lymph nodes: No  pathologically enlarged lymph nodes. Bones: Mild degenerative changes of the bilateral hips and lower lumbar spine. Neurostimulator electrode terminates in the left S3 foramen. Soft tissues: Focal areas of skin thickening with subcutaneous edema/fat stranding in the abdominal wall, left greater than right, could be related to subcutaneous injections. Incidental findings: Scattered coronary artery calcifications. Elevation of the right hemidiaphragm of unknown etiology.     Impression: Mild circumferential wall thickening of the transverse colon, splenic flexure, descending colon with scattered air-fluid levels without significant surrounding fat stranding/edema probably represents mild infectious colitis or inflammatory bowel disease. Electronically signed by:  Jenny Garcia  7/2/2021 10:54 PM CDT Workstation: [id number]    XR Chest 1 View    Result Date: 7/2/2021  Narrative: CLINICAL INDICATION: Central line placement TECHNIQUE: Single view of the chest COMPARISON: 7/2/2021 at 8:49 PM FINDINGS: Previously seen right internal jugular catheter has been removed in the interval. A new left internal jugular catheter terminates in the superior vena cava. Elevation of the right hemidiaphragm. Lungs are clear. No pleural effusion or pneumothorax. Cardiomediastinal silhouette is prominent. Degenerative changes in the spine and bilateral shoulders. Postsurgical changes of median sternotomy.     Impression: 1.  A new left internal jugular catheter terminates in the superior vena cava. No pneumothorax. 2.  No acute cardiopulmonary disease. 3.  Prominent cardiac silhouette. Electronically signed by:  Maya Grace MD  7/2/2021 9:42 PM CDT Workstation: [id number]    XR Chest 1 View    Result Date: 7/2/2021  Narrative: CLINICAL INDICATION: s/p central line placement TECHNIQUE: Single view of the chest COMPARISON: 5/17/2021. FINDINGS: Right internal jugular catheter terminates in the right subclavian vein. Mild elevation of the  right hemidiaphragm. Lung volumes are diminished. Lungs are clear. No pleural effusion or pneumothorax. Cardiomediastinal silhouette is enlarged. Postsurgical changes of median sternotomy. Cholecystectomy clips in the right upper quadrant.     Impression: 1.  Right internal jugular catheter terminates in the right subclavian vein. Recommend repositioning. 2.  No acute cardiopulmonary disease. 3.  Enlarged cardiomediastinal silhouette. Electronically signed by:  Maya Grace MD  7/2/2021 9:16 PM CDT Workstation: [id number]                                  [facility]    Final diagnoses:   Acute colitis   Acute kidney injury (CMS/HCC)       ED Disposition  ED Disposition     ED Disposition Condition Comment    Decision to Admit  Level of Care: Stepdown [25]   Diagnosis: Acute colitis [413979]   Admitting Physician: BEHROOZI, SAEID [[id number]]   Attending Physician: BEHROOZI, SAEID [[id number]]   Certification: I Certify That Inpatient Hospital Services Are Medically Necessary For Greater Than 2 Midnights            No follow-up provider specified.       Medication List      No changes were made to your prescriptions during this visit.          Froilan Martinez MD  07/02/21 [id number]       Froilan Martinez MD  07/02/21 [id number]

## 2021-07-03 NOTE — H&P
[facility] Medicine Admission      Date of Admission: 7/2/2021      Primary Care Physician: Kimberly Ferguson APRN      Chief Complaint: Abdominal pain,    HPI:  Patient is a 61-year-old obese multimorbid female with known past medical history of coronary artery disease, CABG in 2005, diabetes mellitus, hypertension, left below-knee amputation, congestive heart failure rate, dyslipidemia who presented to the ER with complaint of abdominal pain for 2 days.  She was hypotensive was given IV fluid, central line was placed in ED and  underwent CT of the abdomen and pelvis which was concerning for colitis.  Pathology service was called for admission of the patient.  He was seen and examined in ED room 6.  Reported for the last 3 days she has mid abdominal pain.  Had nausea and episode of vomiting nonbilious nonbloody.  She had more frequent bowel movements treat today with diarrhea.  She denied any fall injury trauma, fever chills shortness of breath.  In ED she reported chest pain which she did not report to me.  She denies being on any antibiotic outpatient.  She had hospitalization in May 2021.  She mobilized with wheelchair at home    Concurrent Medical History:  has a past medical history of Angina, class IV (CMS/HCC), Anxiety, Anxiety and depression, Arthritis, Benign paroxysmal positional vertigo, Bladder disorder, Carpal tunnel syndrome, CHF (congestive heart failure) (CMS/HCC), Chronic pain, Coronary atherosclerosis, Depression, Diabetes mellitus (CMS/HCC), Diabetic polyneuropathy (CMS/HCC), Disease of thyroid gland, Elevated cholesterol, Female stress incontinence, Foot pain, left, Full dentures, Gastroparesis, GERD (gastroesophageal reflux disease), Hyperlipidemia, Hypertension, Low back pain, Malaise and fatigue, Multiple joint pain, Obesity, Occlusion and stenosis of bilateral carotid arteries (6/18/2021), Otalgia, Perforation of tympanic membrane, Postoperative  wound infection, Vitamin D deficiency, and Wears glasses.    Past Surgical History:  has a past surgical history that includes Abdominal surgery; Angioplasty; Coronary artery bypass graft (2005); Cardiac catheterization; Carpal tunnel release; Cholecystectomy; endoscopy and colonoscopy; Mouth surgery; transesophageal echocardiogram (mildred); Foot surgery; gastric banding; Hysterectomy; Shoulder surgery; Salpingoophorectomy; Cardiac catheterization (N/A, 6/20/2017); Breast biopsy (Right); Esophagogastroduodenoscopy (N/A, 10/19/2018); subtalar arthrodesis (Left, 1/16/2019); subtalar arthrodesis (Left, 10/16/2019); Foot surgery; Cardiac catheterization (N/A, 2/18/2020); Esophagogastroduodenoscopy (N/A, 6/24/2020); Colonoscopy (N/A, 6/24/2020); subtalar arthrodesis (Left, 9/30/2020); Ankle Arthroscopy (Left, 2/26/2021); incision and drainage leg (Left, 3/12/2021); and Foot Amputation.    Family History: family history includes Diabetes in her sister, sister, sister, sister, sister, sister and another family member; Heart disease in her mother, sister, sister, and another family member; Hypertension in her mother, sister, sister, and another family member; Stroke in her mother.     Social History:  reports that she has never smoked. She has never used smokeless tobacco. She reports that she does not drink alcohol and does not use drugs.    Allergies:   Allergies   Allergen Reactions   • Seroquel [Quetiapine] Anaphylaxis   • Avandia [Rosiglitazone] Swelling   • Morphine And Related Hallucinations   • Oxycodone Hallucinations       Medications:   Prior to Admission medications    Medication Sig Start Date End Date Taking? Authorizing Provider   ARIPiprazole (ABILIFY) 15 MG tablet TAKE 1 TABLET BY MOUTH EVERY DAY 5/4/21   Ferguson, BEBE Luu   aspirin (aspirin) 81 MG EC tablet Take 81 mg by mouth Daily.    Provider, MD Esther   atorvastatin (LIPITOR) 80 MG tablet TAKE 1 TABLET BY MOUTH EVERY DAY 6/14/21   Ferguson,  BEBE Luu   BD SHARPS CONTAINER HOME misc 1 each Take As Directed. 9/7/18   Francisca Fong MD   Blood Glucose Monitoring Suppl (ONE TOUCH ULTRA MINI) w/Device kit USE AS DIRECTED TO CHECK BLOOD SUGAR 7/11/18   Francisca Fong MD   Calcium Citrate-Vitamin D (CITRACAL/VITAMIN D) 250-200 MG-UNIT tablet Take 2 tablets by mouth 2 (two) times a day.    ProviderEsther MD   clopidogrel (PLAVIX) 75 MG tablet TAKE 1 TABLET BY MOUTH EVERY DAY 6/14/21   Marty, BEBE Luu   cyanocobalamin 1000 MCG/ML injection INJECT 1 ML INTO THE APPROPRIATE MUSCLE AS DIRECTED BY PRESCRIBER EVERY 28 (TWENTY-EIGHT) DAYS. 5/5/21   Geri Baig MD   folic acid (FOLVITE) 1 MG tablet Take 1 tablet by mouth Daily. 4/22/21   Teressa Hammond APRN   furosemide (LASIX) 40 MG tablet TAKE 1 TABLET BY MOUTH EVERY DAY 4/5/21   Marty, BEBE Luu   gabapentin (NEURONTIN) 400 MG capsule Take 1 capsule by mouth 3 (Three) Times a Day. 4/20/21   Kimberly Ferguson APRN   GLUCAGON EMERGENCY 1 MG injection USE AS DIRECTED AS NEEDED 7/28/17   Elvis Gamboa APRN   glucose blood (Accu-Chek Iris Plus) test strip USE TO CHECK BLOOD SUGAR 4 TIMES DAILY. E11.9 4/12/21   Elvis Gamboa APRN   glucose monitor monitoring kit 1 each Daily. accucheck iris meter, E11.9 6/11/20   Elvis Gamboa APRN   hydroCHLOROthiazide (HYDRODIURIL) 25 MG tablet Take 25 mg by mouth Daily. 5/18/21   Esther Henao MD   HYDROcodone-acetaminophen (NORCO) 7.5-325 MG per tablet Take 1 tablet by mouth Every 6 (Six) Hours As Needed for Moderate Pain . 6/24/21   Kimberly Ferguson APRN   Insulin Glargine (BASAGLAR KWIKPEN) 100 UNIT/ML injection pen Inject 60 Units under the skin into the appropriate area as directed Every Night.    Esther Henao MD   Insulin Pen Needle (B-D ULTRAFINE III SHORT PEN) 31G X 8 MM misc USE TO INJECT 4 TIMES A DAY 12/27/20   Elvis Gamboa APRN   lisinopril  "(PRINIVIL,ZESTRIL) 20 MG tablet Take 1 tablet by mouth Daily. 6/24/21   Ferguson, BEBE Luu   LORazepam (ATIVAN) 0.5 MG tablet Take 1 tablet by mouth Every 8 (Eight) Hours As Needed for Anxiety. Frequency and # increased on 6/24/21. Refill early accordingly. 6/24/21   Marty, BEBE Luu   meclizine (ANTIVERT) 25 MG tablet Take 1 tablet by mouth 3 (Three) Times a Day As Needed for dizziness. 12/14/17   Evon Hernandez APRN   methocarbamol (Robaxin) 500 MG tablet Take 1 tablet by mouth 2 (Two) Times a Day As Needed for Muscle Spasms. 6/24/21   Marty, BEBE Luu   metoclopramide (REGLAN) 10 MG tablet TAKE 1 TABLET BY MOUTH FOUR TIMES A DAY AS NEEDED 5/17/21   Marcela Lawson APRN   metoprolol succinate XL (Toprol XL) 50 MG 24 hr tablet Take 1 tablet by mouth Daily. Dose increased 5/24/21 5/24/21   Marty, BEBE Luu   mirtazapine (Remeron) 30 MG tablet Take 1 tablet by mouth Every Night. 6/28/21   Marty, BEBE Luu   naloxone (NARCAN) 0.4 MG/ML injection Infuse 0.5 mL into a venous catheter Every 5 (Five) Minutes As Needed for Opioid Reversal. 3/13/21   Nick Faye MD   nitroglycerin (NITROSTAT) 0.4 MG SL tablet Place 1 tablet under the tongue Every 5 (Five) Minutes As Needed for Chest Pain. Take no more than 3 doses in 15 minutes. 4/29/21   Ferguson, BEBE Luu   NOVOLOG FLEXPEN 100 UNIT/ML solution pen-injector sc pen INJECT 60 UNITS BEFORE MEALS 3 TIMES A DAY 4/6/20   Baldemar Melendez MD   ondansetron (ZOFRAN) 8 MG tablet TAKE 1 TABLET BY MOUTH EVERY 8 (EIGHT) HOURS AS NEEDED FOR NAUSEA OR VOMITING. 1/4/21   Marty, BEBE Luu   pantoprazole (PROTONIX) 20 MG EC tablet TAKE 1 TABLET BY MOUTH EVERY DAY 12/23/20   Geri Baig MD   Syringe/Needle, Disp, (Luer Lock Safety Syringes) 25G X 5/8\" 3 ML misc 1 each Daily. 1 Q28 DAYS 4/16/20   Kimberly Ferguson APRN   venlafaxine XR (EFFEXOR-XR) 150 MG 24 hr capsule TAKE 1 CAPSULE BY MOUTH TWICE A DAY 6/1/21   " Kimberly Ferguson APRN   vitamin D (ERGOCALCIFEROL) 1.25 MG (39636 UT) capsule capsule TAKE ONE CAPSULE BY MOUTH EVERY SUNDAY. 4/20/21   Kimberly Ferguson APRN       Review of Systems:  Review of Systems   Constitutional: Negative for chills, diaphoresis, fatigue and fever.        Morbid obesity   HENT: Negative for congestion, dental problem, ear pain, facial swelling, rhinorrhea and sinus pressure.    Eyes: Negative for photophobia, discharge, redness, itching and visual disturbance.   Respiratory: Positive for cough. Negative for apnea, choking, chest tightness, shortness of breath, wheezing and stridor.    Cardiovascular: Negative for chest pain, palpitations and leg swelling.   Gastrointestinal: Positive for abdominal pain, diarrhea, nausea and vomiting. Negative for abdominal distention, anal bleeding, blood in stool and rectal pain.   Endocrine: Negative for cold intolerance, heat intolerance, polydipsia, polyphagia and polyuria.   Genitourinary: Negative for difficulty urinating, flank pain, frequency, hematuria and urgency.   Musculoskeletal: Negative for arthralgias, back pain, joint swelling and myalgias.        Recent left BKA with staples   Skin: Negative for pallor, rash and wound.   Allergic/Immunologic: Negative for environmental allergies and immunocompromised state.   Neurological: Negative for dizziness, tremors, seizures, facial asymmetry, speech difficulty, weakness, light-headedness, numbness and headaches.   Hematological: Negative for adenopathy. Does not bruise/bleed easily.   Psychiatric/Behavioral: Negative for agitation, behavioral problems and hallucinations. The patient is not nervous/anxious.       Otherwise complete ROS is negative except as mentioned above.    Physical Exam:   Temp:  [98.3 °F (36.8 °C)] 98.3 °F (36.8 °C)  Heart Rate:  [70-91] 72  Resp:  [14-18] 14  BP: ()/(34-59) 115/57  Physical Exam  Constitutional:       General: She is not in acute distress.      Appearance: She is normal weight. She is toxic-appearing. She is not ill-appearing or diaphoretic.      Comments: Morbid obese   HENT:      Head: Normocephalic and atraumatic.      Right Ear: External ear normal.      Left Ear: External ear normal.      Nose: Nose normal.      Mouth/Throat:      Mouth: Mucous membranes are moist.      Pharynx: Oropharynx is clear.   Eyes:      Extraocular Movements: Extraocular movements intact.      Conjunctiva/sclera: Conjunctivae normal.      Pupils: Pupils are equal, round, and reactive to light.   Cardiovascular:      Rate and Rhythm: Normal rate and regular rhythm.      Heart sounds: No murmur heard.   No friction rub. No gallop.    Pulmonary:      Effort: No respiratory distress.      Breath sounds: No stridor. No wheezing or rales.   Chest:      Chest wall: No tenderness.   Abdominal:      General: Abdomen is flat. There is no distension.      Palpations: Abdomen is soft.      Tenderness: There is abdominal tenderness. There is no guarding or rebound.      Comments: Obese   Musculoskeletal:         General: No swelling or tenderness.      Cervical back: No rigidity or tenderness.      Right lower leg: No edema.      Left lower leg: No edema.      Comments: Left BKA with close wound and staples present   Lymphadenopathy:      Cervical: No cervical adenopathy.   Skin:     General: Skin is warm and dry.      Coloration: Skin is not jaundiced.      Findings: No erythema.   Neurological:      Mental Status: She is alert and oriented to person, place, and time. Mental status is at baseline.      Sensory: No sensory deficit.      Motor: No weakness.      Coordination: Coordination normal.   Psychiatric:         Mood and Affect: Mood normal.         Behavior: Behavior normal.         Judgment: Judgment normal.     IV access line: left neck central line present.      Results Reviewed:  I have personally reviewed current lab, radiology, and data and agree with results.  Lab Results  (last 24 hours)     Procedure Component Value Units Date/Time    COVID-19 and FLU A/B PCR - Swab, Nasopharynx [[id number]] Collected: 07/02/21 2326    Specimen: Swab from Nasopharynx Updated: 07/02/21 2327    Extra Tubes [[id number]] Collected: 07/02/21 2202    Specimen: Blood, Venous Line Updated: 07/02/21 2315    Narrative:      The following orders were created for panel order Extra Tubes.  Procedure                               Abnormality         Status                     ---------                               -----------         ------                     Gold Top - SST[[id number]]                                   Final result                 Please view results for these tests on the individual orders.    Gold Top - SST [[id number]] Collected: 07/02/21 2202    Specimen: Blood Updated: 07/02/21 2315     Extra Tube Hold for add-ons.     Comment: Auto resulted.       Blood Culture - Blood, Blood, Central Line [[id number]] Collected: 07/02/21 2242    Specimen: Blood, Central Line Updated: 07/02/21 2245    Comprehensive Metabolic Panel [[id number]]  (Abnormal) Collected: 07/02/21 2202    Specimen: Blood Updated: 07/02/21 2230     Glucose 241 mg/dL      BUN 32 mg/dL      Creatinine 2.00 mg/dL      Sodium 133 mmol/L      Potassium 4.0 mmol/L      Chloride 95 mmol/L      CO2 25.0 mmol/L      Calcium 9.3 mg/dL      Total Protein 6.8 g/dL      Albumin 3.50 g/dL      ALT (SGPT) 19 U/L      AST (SGOT) 18 U/L      Alkaline Phosphatase 129 U/L      Total Bilirubin 0.3 mg/dL      eGFR Non African Amer 26 mL/min/1.73      Globulin 3.3 gm/dL      A/G Ratio 1.1 g/dL      BUN/Creatinine Ratio 16.0     Anion Gap 13.0 mmol/L     Narrative:      GFR Normal >60  Chronic Kidney Disease <60  Kidney Failure <15      Lipase [[id number]]  (Normal) Collected: 07/02/21 2202    Specimen: Blood Updated: 07/02/21 2230     Lipase 14 U/L     Lactic Acid, Plasma [[id number]]  (Normal) Collected: 07/02/21 2202    Specimen: Blood Updated: 07/02/21  2226     Lactate 1.5 mmol/L     CBC & Differential [[id number]]  (Abnormal) Collected: 07/02/21 2202    Specimen: Blood Updated: 07/02/21 2220    Narrative:      The following orders were created for panel order CBC & Differential.  Procedure                               Abnormality         Status                     ---------                               -----------         ------                     CBC Auto Differential[[id number]]        Abnormal            Final result                 Please view results for these tests on the individual orders.    CBC Auto Differential [[id number]]  (Abnormal) Collected: 07/02/21 2202    Specimen: Blood Updated: 07/02/21 2220     WBC 24.21 10*3/mm3      RBC 4.15 10*6/mm3      Hemoglobin 11.5 g/dL      Hematocrit 35.2 %      MCV 84.8 fL      MCH 27.7 pg      MCHC 32.7 g/dL      RDW 14.2 %      RDW-SD 43.9 fl      MPV 9.9 fL      Platelets 463 10*3/mm3      Neutrophil % 84.4 %      Lymphocyte % 7.6 %      Monocyte % 5.7 %      Eosinophil % 0.8 %      Basophil % 0.4 %      Immature Grans % 1.1 %      Neutrophils, Absolute 20.45 10*3/mm3      Lymphocytes, Absolute 1.83 10*3/mm3      Monocytes, Absolute 1.37 10*3/mm3      Eosinophils, Absolute 0.20 10*3/mm3      Basophils, Absolute 0.09 10*3/mm3      Immature Grans, Absolute 0.27 10*3/mm3      nRBC 0.0 /100 WBC     Blood Culture - Blood, Blood, Central Line [[id number]] Collected: 07/02/21 2201    Specimen: Blood, Central Line Updated: 07/02/21 2210    POC Glucose Once [[id number]]  (Abnormal) Collected: 07/02/21 1922    Specimen: Blood Updated: 07/02/21 1934     Glucose 169 mg/dL      Comment: RN NotifiedOperator: [id number] KAREN Ac ID: [id number]           Imaging Results (Last 24 Hours)     Procedure Component Value Units Date/Time    CT Abdomen Pelvis With Contrast [[id number]] Collected: 07/02/21 2200     Updated: 07/02/21 2255    Narrative:      CT ABDOMEN PELVIS WITH IV CONTRAST    INDICATION: 61 years Female;  abdominal pain    TECHNIQUE:  CT scan of the abdomen and pelvis was performed with  IV contrast.  This exam was performed according to our  departmental dose-optimization program, which includes automated  exposure control, adjustment of the mA and/or kV according to  patient size and/or use of iterative reconstruction technique.     Comparison: 11/27/2017.    FINDINGS:  Liver: Unremarkable.  Gallbladder/Biliary tree: Status post cholecystectomy. No  significant biliary dilatation. Pneumobilia is probably within  normal limits for previous sphincterotomy.   Pancreas: Fatty atrophy of the pancreas.  Spleen: Unremarkable.  Adrenals: Unremarkable.  Genitourinary: No hydronephrosis or nephrolithiasis. No bladder  wall thickening. Status post hysterectomy. No adnexal masses.  Gastrointestinal: No gastric wall thickening. Mild  circumferential wall thickening of the transverse colon, splenic  flexure, descending colon with scattered air-fluid levels without  significant surrounding fat stranding/edema probably represents  mild infectious colitis or inflammatory bowel disease. No free  air or free fluid. Status post appendectomy.  Peritoneum: Unremarkable.  Vasculature: Mild atherosclerosis of the aorta and iliac arteries  including the mesenteric branches.  Lymph nodes: No pathologically enlarged lymph nodes.  Bones: Mild degenerative changes of the bilateral hips and lower  lumbar spine. Neurostimulator electrode terminates in the left S3  foramen.  Soft tissues: Focal areas of skin thickening with subcutaneous  edema/fat stranding in the abdominal wall, left greater than  right, could be related to subcutaneous injections.  Incidental findings: Scattered coronary artery calcifications.  Elevation of the right hemidiaphragm of unknown etiology.       Impression:      Mild circumferential wall thickening of the transverse colon,  splenic flexure, descending colon with scattered air-fluid levels  without significant  surrounding fat stranding/edema probably  represents mild infectious colitis or inflammatory bowel disease.    Electronically signed by:  Jenny Garcia  7/2/2021 10:54 PM CDT  Workstation: [id number]    XR Chest 1 View [[id number]] Collected: 07/02/21 2120     Updated: 07/02/21 2143    Narrative:      CLINICAL INDICATION: Central line placement    TECHNIQUE: Single view of the chest    COMPARISON: 7/2/2021 at 8:49 PM    FINDINGS: Previously seen right internal jugular catheter has  been removed in the interval. A new left internal jugular  catheter terminates in the superior vena cava. Elevation of the  right hemidiaphragm. Lungs are clear. No pleural effusion or  pneumothorax. Cardiomediastinal silhouette is prominent.  Degenerative changes in the spine and bilateral shoulders.  Postsurgical changes of median sternotomy.      Impression:      1.  A new left internal jugular catheter terminates in the  superior vena cava. No pneumothorax.  2.  No acute cardiopulmonary disease.  3.  Prominent cardiac silhouette.    Electronically signed by:  Maya Grace MD  7/2/2021 9:42 PM  CDT Workstation: [id number]    XR Chest 1 View [[id number]] Collected: 07/02/21 2050     Updated: 07/02/21 2117    Narrative:      CLINICAL INDICATION: s/p central line placement    TECHNIQUE: Single view of the chest    COMPARISON: 5/17/2021.    FINDINGS: Right internal jugular catheter terminates in the right  subclavian vein. Mild elevation of the right hemidiaphragm. Lung  volumes are diminished. Lungs are clear. No pleural effusion or  pneumothorax. Cardiomediastinal silhouette is enlarged.  Postsurgical changes of median sternotomy. Cholecystectomy clips  in the right upper quadrant.      Impression:      1.  Right internal jugular catheter terminates in the right  subclavian vein. Recommend repositioning.  2.  No acute cardiopulmonary disease.  3.  Enlarged cardiomediastinal silhouette.    Electronically signed by:  Maya Grace MD   "7/2/2021 9:16 PM  CDT Workstation: [id number]            Assessment:    Active Hospital Problems    Diagnosis    • Acute colitis      # Sepsis, severe sepsis, present on admission, possible in setting of colitis  # Colitis  -CT of the abdomen and pelvis showed \"mild circumferential wall thickening of the transverse colon,  splenic flexure, descending colon with scattered air-fluid levels  without significant surrounding fat stranding/edema probably  represents mild infectious colitis or inflammatory bowel disease.\"  # Abdominal pain nausea vomiting secondary to above   # Acute renal failure in setting of sepsis  # Leukocytosis, reactive, in setting of sepsis  # Hypotension in setting of sepsis  # Diabetes mellitus type 2  # Morbid obesity  # Uncontrolled diabetes mellitus type 2 with independent, with hyperglycemia  # Anemia with no sign of active bleeding  # History of coronary artery disease with remote history of CABG# Left BKA   # Obesity with BMI of 35        Plan:  Admit to intensive care unit  Obtain stool for C. Difficile  Obtain procalcitonin level  Blood cultures was obtained in the ED  Placed on Zosyn and Flagyl pending further evaluation  Placed on IV fluid, analgesics supportive therapy  Placed on clear liquid diet  Insulin sliding scale  Her comorbidities will be treated appropriately  Avoid nephrotoxic medication as much as possible  Reconcile home medication and continue with essential home medication including aspirin, Plavix, metoprolol  Further decision making is depending on response to alcohol care  Instructed patient about necessity of outpatient colonoscopy up in recovery no later than in 8 weeks, she denies any prior history of colonoscopy   Lifestyle modification, medically supervised weight reduction, medically supervised exercise program, low calorie diet, for treatment of her obesity, all outpatient recommended  Please refer to orders for comprehensive plan    I confirmed that the " patient's Advance Care Plan is present, code status is documented, or surrogate decision maker is listed in the patient's medical record.     I have utilized all available immediate resources to obtain, update, or review the patient's current medications.     I discussed the patient's findings and my recommendations with patient and she agreed with above plan of care      Saeid Behroozi, MD   07/02/21   23:35 CDT

## 2021-07-03 NOTE — ED NOTES
Left IJ in place. Confirmed with xray. Okay for use per verbal order from Dr Johnson. Patient tolerated well.      Franci John, RN  07/02/21 [id number]

## 2021-07-03 NOTE — PLAN OF CARE
Goal Outcome Evaluation:  Plan of Care Reviewed With: caregiver, patient        Progress: no change  Outcome Summary: Pt currently on clear liquids due to GI distress.  Will monitor diet advancement and tolerance

## 2021-07-03 NOTE — PROGRESS NOTES
[facility] Medicine [facility]  INPATIENT PROGRESS NOTE    Length of Stay: 1  Date of Admission: 7/2/2021  Primary Care Physician: Marty, BEBE Luu    Subjective   Chief Complaint: Abdominal pain    HPI: Patient states that her abdominal pain is slightly improved but persists.  She has a poor appetite. She denies vomiting today.  She is being managed for sepsis and septic shock secondary to colitis.  She also has uncontrolled hyperglycemia.    Review of Systems   Constitutional: Positive for appetite change. Negative for activity change, chills, fatigue and fever.   HENT: Negative for congestion, rhinorrhea, sore throat and trouble swallowing.    Respiratory: Negative for cough, chest tightness, shortness of breath and wheezing.    Cardiovascular: Negative for chest pain, palpitations and leg swelling.   Gastrointestinal: Positive for abdominal pain and nausea. Negative for abdominal distention, diarrhea and vomiting.   Genitourinary: Negative for difficulty urinating, dysuria and hematuria.   Musculoskeletal: Negative for arthralgias, back pain and myalgias.   Skin: Negative for pallor and rash.   Neurological: Negative for dizziness, syncope, weakness, light-headedness and headaches.   Hematological: Negative for adenopathy. Does not bruise/bleed easily.   Psychiatric/Behavioral: Negative for agitation and confusion. The patient is not nervous/anxious.      Objective    Temp:  [96.8 °F (36 °C)-98.3 °F (36.8 °C)] 97.7 °F (36.5 °C)  Heart Rate:  [] 81  Resp:  [14-18] 16  BP: ()/(30-59) 67/30    Physical Exam  Constitutional:       General: She is not in acute distress.     Appearance: She is ill-appearing. She is not diaphoretic.   HENT:      Head: Normocephalic and atraumatic.      Right Ear: External ear normal.      Left Ear: External ear normal.      Nose: No congestion or rhinorrhea.      Mouth/Throat:      Mouth: Mucous membranes are moist.       Pharynx: No oropharyngeal exudate or posterior oropharyngeal erythema.   Eyes:      General: No scleral icterus.     Extraocular Movements: Extraocular movements intact.      Conjunctiva/sclera: Conjunctivae normal.   Cardiovascular:      Rate and Rhythm: Normal rate and regular rhythm.      Heart sounds: Normal heart sounds. No murmur heard.     Pulmonary:      Effort: Pulmonary effort is normal. No respiratory distress.      Breath sounds: Normal breath sounds. No wheezing, rhonchi or rales.   Abdominal:      General: Abdomen is flat. There is no distension.      Palpations: Abdomen is soft.      Tenderness: There is abdominal tenderness. There is no guarding.      Comments: Generalized tenderness worsening left and right lower quadrants.  No rebound.   Musculoskeletal:         General: Deformity present. No swelling or tenderness.      Cervical back: Neck supple. No rigidity. No muscular tenderness.      Right lower leg: No edema.      Left lower leg: No edema.      Comments: Left BKA present.   Lymphadenopathy:      Cervical: No cervical adenopathy.   Skin:     General: Skin is warm and dry.   Neurological:      General: No focal deficit present.      Mental Status: She is alert and oriented to person, place, and time.      Cranial Nerves: No cranial nerve deficit.      Motor: No weakness.   Psychiatric:         Mood and Affect: Mood normal.         Behavior: Behavior normal.         Thought Content: Thought content normal.       Medication Review:    Current Facility-Administered Medications:   •  acetaminophen (TYLENOL) tablet 650 mg, 650 mg, Oral, Q6H PRN, Brandon Martel MD, 650 mg at 07/03/21 1405  •  albumin human 25 % IV SOLN 25 g, 25 g, Intravenous, Once, Brandon Martel MD  •  aspirin EC tablet 81 mg, 81 mg, Oral, Daily, Behroozi, Saeid, MD, 81 mg at 07/03/21 0843  •  atorvastatin (LIPITOR) tablet 80 mg, 80 mg, Oral, Daily, Behroozi, Saeid, MD, 80 mg at 07/03/21 0843  •  clopidogrel (PLAVIX)  tablet 75 mg, 75 mg, Oral, Daily, Behroozi, Saeid, MD, 75 mg at 07/03/21 0843  •  dextrose (D50W) 25 g/ 50mL Intravenous Solution 25 g, 25 g, Intravenous, Q15 Min PRN, Behroozi, Saeid, MD  •  dextrose (GLUTOSE) oral gel 15 g, 15 g, Oral, Q15 Min PRN, Behroozi, Saeid, MD  •  enoxaparin (LOVENOX) syringe 40 mg, 40 mg, Subcutaneous, Q24H, Behroozi, Saeid, MD, 40 mg at 07/03/21 0903  •  famotidine (PEPCID) injection 20 mg, 20 mg, Intravenous, Daily, Behroozi, Saeid, MD, 20 mg at 07/03/21 0843  •  folic acid (FOLVITE) tablet 1 mg, 1 mg, Oral, Daily, Behroozi, Saeid, MD, 1 mg at 07/03/21 0843  •  glucagon (human recombinant) (GLUCAGEN DIAGNOSTIC) injection 1 mg, 1 mg, Subcutaneous, Q15 Min PRN, Behroozi, Saeid, MD  •  HYDROmorphone (DILAUDID) injection 0.5 mg, 0.5 mg, Intravenous, Q4H PRN, Behroozi, Saeid, MD, 0.5 mg at 07/03/21 0642  •  HYDROmorphone (DILAUDID) injection 1 mg, 1 mg, Intravenous, Once, Froilan Martinez MD, Stopped at 07/02/21 2219  •  insulin aspart (novoLOG) injection 0-24 Units, 0-24 Units, Subcutaneous, TID AC, Brandon Martel MD, 20 Units at 07/03/21 1126  •  insulin detemir (LEVEMIR) injection 30 Units, 30 Units, Subcutaneous, Q12H, Brandon Martel MD, 30 Units at 07/03/21 1126  •  lactated ringers infusion, 150 mL/hr, Intravenous, Continuous, Brandon Martel MD, Last Rate: 150 mL/hr at 07/03/21 1411, 150 mL/hr at 07/03/21 1411  •  metoprolol succinate XL (TOPROL-XL) 24 hr tablet 50 mg, 50 mg, Oral, Daily, Behroozi, Saeid, MD  •  norepinephrine (LEVOPHED) 8 mg in 250 mL NS infusion (premix), 0.02-0.3 mcg/kg/min, Intravenous, Titrated, Brandon Martel MD, Last Rate: 7.8 mL/hr at 07/03/21 1215, 0.04 mcg/kg/min at 07/03/21 1215  •  ondansetron (ZOFRAN) injection 4 mg, 4 mg, Intravenous, Q6H PRN, Behroozi, Saeid, MD, 4 mg at 07/03/21 0842  •  piperacillin-tazobactam (ZOSYN) 3.375 g/100 mL 0.9% NS IVPB (mbp), 3.375 g, Intravenous, Q8H, Behroozi, Saeid, MD, 3.375 g at 07/03/21 1406  •   prochlorperazine (COMPAZINE) injection 5 mg, 5 mg, Intravenous, Q6H PRN, Behroozi, Saeid, MD  •  [COMPLETED] Insert peripheral IV, , , Once **AND** sodium chloride 0.9 % flush 10 mL, 10 mL, Intravenous, PRN, Froilan Martinez MD  •  sodium chloride 0.9 % flush 10 mL, 10 mL, Intravenous, Q12H, Behroozi, Saeid, MD, 10 mL at 07/03/21 0844  •  sodium chloride 0.9 % flush 10 mL, 10 mL, Intravenous, PRN, Behroozi, Saeid, MD  •  sodium chloride 0.9 % flush 10 mL, 10 mL, Intravenous, Q12H, Behroozi, Saeid, MD  •  sodium chloride 0.9 % flush 10 mL, 10 mL, Intravenous, Q12H, Behroozi, Saeid, MD  •  sodium chloride 0.9 % flush 10 mL, 10 mL, Intravenous, Q12H, Behroozi, Saeid, MD  •  sodium chloride 0.9 % flush 10 mL, 10 mL, Intravenous, PRN, Behroozi, Saeid, MD  •  sodium chloride 0.9 % flush 20 mL, 20 mL, Intravenous, PRCAIT, Behroozi, Saeid, MD    I have reviewed the patient's current medications.     Results Review:  I have reviewed the labs, radiology results, and diagnostic studies.    Laboratory Data:   Results from last 7 days   Lab Units 07/03/21  0904 07/03/21  0336 07/02/21  2202   SODIUM mmol/L  --  131* 133*   POTASSIUM mmol/L  --  4.4 4.0   CHLORIDE mmol/L  --  93* 95*   CO2 mmol/L  --  21.0* 25.0   BUN mg/dL  --  34* 32*   CREATININE mg/dL  --  2.11* 2.00*   GLUCOSE mg/dL 443* 392* 241*   CALCIUM mg/dL  --  9.3 9.3   BILIRUBIN mg/dL  --  0.4 0.3   ALK PHOS U/L  --  139* 129*   ALT (SGPT) U/L  --  18 19   AST (SGOT) U/L  --  16 18   ANION GAP mmol/L  --  17.0* 13.0     Estimated Creatinine Clearance: 36.2 mL/min (A) (by C-G formula based on SCr of 2.11 mg/dL (H)).  Results from last 7 days   Lab Units 07/03/21  0336   MAGNESIUM mg/dL 1.6         Results from last 7 days   Lab Units 07/03/21  0619 07/02/21  2202   WBC 10*3/mm3 28.39* 24.21*   HEMOGLOBIN g/dL 12.5 11.5*   HEMATOCRIT % 37.8 35.2   PLATELETS 10*3/mm3 447 463*           Culture Data:   No results found for: BLOODCX  No results found for: URINECX  No results  found for: RESPCX  No results found for: WOUNDCX  No results found for: STOOLCX  No components found for: BODYFLD    Radiology Data:   Imaging Results (Last 24 Hours)     Procedure Component Value Units Date/Time    CT Abdomen Pelvis With Contrast [[id number]] Collected: 07/02/21 2200     Updated: 07/02/21 2255    Narrative:      CT ABDOMEN PELVIS WITH IV CONTRAST    INDICATION: 61 years Female; abdominal pain    TECHNIQUE:  CT scan of the abdomen and pelvis was performed with  IV contrast.  This exam was performed according to our  departmental dose-optimization program, which includes automated  exposure control, adjustment of the mA and/or kV according to  patient size and/or use of iterative reconstruction technique.     Comparison: 11/27/2017.    FINDINGS:  Liver: Unremarkable.  Gallbladder/Biliary tree: Status post cholecystectomy. No  significant biliary dilatation. Pneumobilia is probably within  normal limits for previous sphincterotomy.   Pancreas: Fatty atrophy of the pancreas.  Spleen: Unremarkable.  Adrenals: Unremarkable.  Genitourinary: No hydronephrosis or nephrolithiasis. No bladder  wall thickening. Status post hysterectomy. No adnexal masses.  Gastrointestinal: No gastric wall thickening. Mild  circumferential wall thickening of the transverse colon, splenic  flexure, descending colon with scattered air-fluid levels without  significant surrounding fat stranding/edema probably represents  mild infectious colitis or inflammatory bowel disease. No free  air or free fluid. Status post appendectomy.  Peritoneum: Unremarkable.  Vasculature: Mild atherosclerosis of the aorta and iliac arteries  including the mesenteric branches.  Lymph nodes: No pathologically enlarged lymph nodes.  Bones: Mild degenerative changes of the bilateral hips and lower  lumbar spine. Neurostimulator electrode terminates in the left S3  foramen.  Soft tissues: Focal areas of skin thickening with subcutaneous  edema/fat  stranding in the abdominal wall, left greater than  right, could be related to subcutaneous injections.  Incidental findings: Scattered coronary artery calcifications.  Elevation of the right hemidiaphragm of unknown etiology.       Impression:      Mild circumferential wall thickening of the transverse colon,  splenic flexure, descending colon with scattered air-fluid levels  without significant surrounding fat stranding/edema probably  represents mild infectious colitis or inflammatory bowel disease.    Electronically signed by:  Jenny Garcia  7/2/2021 10:54 PM CDT  Workstation: [id number]    XR Chest 1 View [[id number]] Collected: 07/02/21 2120     Updated: 07/02/21 2143    Narrative:      CLINICAL INDICATION: Central line placement    TECHNIQUE: Single view of the chest    COMPARISON: 7/2/2021 at 8:49 PM    FINDINGS: Previously seen right internal jugular catheter has  been removed in the interval. A new left internal jugular  catheter terminates in the superior vena cava. Elevation of the  right hemidiaphragm. Lungs are clear. No pleural effusion or  pneumothorax. Cardiomediastinal silhouette is prominent.  Degenerative changes in the spine and bilateral shoulders.  Postsurgical changes of median sternotomy.      Impression:      1.  A new left internal jugular catheter terminates in the  superior vena cava. No pneumothorax.  2.  No acute cardiopulmonary disease.  3.  Prominent cardiac silhouette.    Electronically signed by:  Maya Grace MD  7/2/2021 9:42 PM  CDT Workstation: [id number]    XR Chest 1 View [[id number]] Collected: 07/02/21 2050     Updated: 07/02/21 2117    Narrative:      CLINICAL INDICATION: s/p central line placement    TECHNIQUE: Single view of the chest    COMPARISON: 5/17/2021.    FINDINGS: Right internal jugular catheter terminates in the right  subclavian vein. Mild elevation of the right hemidiaphragm. Lung  volumes are diminished. Lungs are clear. No pleural effusion or  pneumothorax.  Cardiomediastinal silhouette is enlarged.  Postsurgical changes of median sternotomy. Cholecystectomy clips  in the right upper quadrant.      Impression:      1.  Right internal jugular catheter terminates in the right  subclavian vein. Recommend repositioning.  2.  No acute cardiopulmonary disease.  3.  Enlarged cardiomediastinal silhouette.    Electronically signed by:  Maya Grace MD  7/2/2021 9:16 PM  CDT Workstation: [id number]          Assessment/Plan     Hospital Problem List:  Active Problems:    Acute colitis  Septic shock  Severe sepsis secondary to colitis  Acute kidney injury  Type 2 diabetes mellitus with uncontrolled hyperglycemia  Obesity  History of coronary artery disease  Left BKA  Depression  History of essential hypertension  History of diastolic congestive heart failure    Plan  -Patient has abdominal pain in the setting of CT scan consistent with colitis.  She has septic shock and has been started on IV pressors with Levophed  -Continue aggressive IV fluid hydration.  Will give IV albumin bolus  -Continue IV antibiotics with Zosyn.  Discontinue Flagyl  -Opiate pain medications as needed  -Monitor renal function closely  -Patient has resistant diabetes with high insulin requirement at baseline.  Will start IV Levemir 40 units every 12 hours and custom high-dose NovoLog sliding scale for hyperglycemia  -Continue home medications for coronary artery disease including aspirin, Plavix and atorvastatin  -Hold metoprolol for now due to hypotension.  Hold home medication of Lasix and hydrochlorothiazide  -N.p.o. for now to allow some bowel rest.  Will restart clear liquid diet in the morning  -DVT prophylaxis with subcutaneous Lovenox  -CODE STATUS is full code    35 minutes of critical care time was spent evaluating patient and planning treatments.    Discharge Planning: In progress    I confirmed that the patient's Advance Care Plan is present, code status is documented, or surrogate decision  maker is listed in the patient's medical record.      I have utilized all available immediate resources to obtain, update, or review the patient's current medications.      Brandon Martel MD   07/03/21   14:23 CDT

## 2021-07-03 NOTE — PLAN OF CARE
Goal Outcome Evaluation:  Plan of Care Reviewed With: patient        Progress: no change  Outcome Summary: Hypotensive today 77/38. Levophed initiated. Albumin admin. LR infusing per order. Atbx infused without complication. A/O. No urine output and patient denies urge to void. Straight cath performed via sterile technique, 950 mls clear yellow output. MD notified of elevated blood sugar of 43. Order changed from moderate insulin sliding scale to high dose. Levemir added. NPO per MD order for the day.

## 2021-07-04 LAB
ALBUMIN SERPL-MCNC: 3.7 G/DL (ref 3.5–5.2)
ALBUMIN/GLOB SERPL: 1.4 G/DL
ALP SERPL-CCNC: 103 U/L (ref 39–117)
ALT SERPL W P-5'-P-CCNC: 12 U/L (ref 1–33)
ANION GAP SERPL CALCULATED.3IONS-SCNC: 8 MMOL/L (ref 5–15)
AST SERPL-CCNC: 14 U/L (ref 1–32)
BILIRUB SERPL-MCNC: 0.3 MG/DL (ref 0–1.2)
BUN SERPL-MCNC: 22 MG/DL (ref 6–20)
BUN/CREAT SERPL: 16.7 (ref 7–25)
CA-I BLD-MCNC: 4.63 MG/DL (ref 4.6–5.6)
CALCIUM SPEC-SCNC: 9.7 MG/DL (ref 8.6–10.5)
CHLORIDE SERPL-SCNC: 104 MMOL/L (ref 98–107)
CO2 SERPL-SCNC: 27 MMOL/L (ref 22–29)
CREAT SERPL-MCNC: 1.32 MG/DL (ref 0.57–1)
DEPRECATED RDW RBC AUTO: 46.5 FL (ref 37–54)
ERYTHROCYTE [DISTWIDTH] IN BLOOD BY AUTOMATED COUNT: 14.5 % (ref 12.3–15.4)
GFR SERPL CREATININE-BSD FRML MDRD: 41 ML/MIN/1.73
GLOBULIN UR ELPH-MCNC: 2.6 GM/DL
GLUCOSE BLDC GLUCOMTR-MCNC: 149 MG/DL (ref 70–130)
GLUCOSE BLDC GLUCOMTR-MCNC: 159 MG/DL (ref 70–130)
GLUCOSE BLDC GLUCOMTR-MCNC: 163 MG/DL (ref 70–130)
GLUCOSE BLDC GLUCOMTR-MCNC: 186 MG/DL (ref 70–130)
GLUCOSE BLDC GLUCOMTR-MCNC: 203 MG/DL (ref 70–130)
GLUCOSE BLDC GLUCOMTR-MCNC: 53 MG/DL (ref 70–130)
GLUCOSE BLDC GLUCOMTR-MCNC: 76 MG/DL (ref 70–130)
GLUCOSE SERPL-MCNC: 161 MG/DL (ref 65–99)
HCT VFR BLD AUTO: 32.4 % (ref 34–46.6)
HGB BLD-MCNC: 10.6 G/DL (ref 12–15.9)
MAGNESIUM SERPL-MCNC: 1.7 MG/DL (ref 1.6–2.6)
MCH RBC QN AUTO: 28.9 PG (ref 26.6–33)
MCHC RBC AUTO-ENTMCNC: 32.7 G/DL (ref 31.5–35.7)
MCV RBC AUTO: 88.3 FL (ref 79–97)
PLATELET # BLD AUTO: 330 10*3/MM3 (ref 140–450)
PMV BLD AUTO: 9.9 FL (ref 6–12)
POTASSIUM SERPL-SCNC: 3.9 MMOL/L (ref 3.5–5.2)
PROT SERPL-MCNC: 6.3 G/DL (ref 6–8.5)
QT INTERVAL: 370 MS
QTC INTERVAL: 450 MS
RBC # BLD AUTO: 3.67 10*6/MM3 (ref 3.77–5.28)
SODIUM SERPL-SCNC: 139 MMOL/L (ref 136–145)
WBC # BLD AUTO: 13.2 10*3/MM3 (ref 3.4–10.8)

## 2021-07-04 PROCEDURE — 63710000001 INSULIN DETEMIR PER 5 UNITS: Performed by: INTERNAL MEDICINE

## 2021-07-04 PROCEDURE — 83735 ASSAY OF MAGNESIUM: CPT | Performed by: INTERNAL MEDICINE

## 2021-07-04 PROCEDURE — 25010000002 ENOXAPARIN PER 10 MG: Performed by: INTERNAL MEDICINE

## 2021-07-04 PROCEDURE — 63710000001 INSULIN ASPART PER 5 UNITS: Performed by: INTERNAL MEDICINE

## 2021-07-04 PROCEDURE — 25010000002 ONDANSETRON PER 1 MG: Performed by: INTERNAL MEDICINE

## 2021-07-04 PROCEDURE — 25010000002 HYDROMORPHONE 1 MG/ML SOLUTION: Performed by: INTERNAL MEDICINE

## 2021-07-04 PROCEDURE — 82330 ASSAY OF CALCIUM: CPT | Performed by: INTERNAL MEDICINE

## 2021-07-04 PROCEDURE — 80053 COMPREHEN METABOLIC PANEL: CPT | Performed by: INTERNAL MEDICINE

## 2021-07-04 PROCEDURE — 82962 GLUCOSE BLOOD TEST: CPT

## 2021-07-04 PROCEDURE — 25010000002 PIPERACILLIN SOD-TAZOBACTAM PER 1 G: Performed by: INTERNAL MEDICINE

## 2021-07-04 PROCEDURE — 85027 COMPLETE CBC AUTOMATED: CPT | Performed by: INTERNAL MEDICINE

## 2021-07-04 RX ADMIN — SODIUM CHLORIDE, PRESERVATIVE FREE 10 ML: 5 INJECTION INTRAVENOUS at 20:38

## 2021-07-04 RX ADMIN — TAMSULOSIN HYDROCHLORIDE 0.4 MG: 0.4 CAPSULE ORAL at 08:39

## 2021-07-04 RX ADMIN — SODIUM CHLORIDE, POTASSIUM CHLORIDE, SODIUM LACTATE AND CALCIUM CHLORIDE 150 ML/HR: 600; 310; 30; 20 INJECTION, SOLUTION INTRAVENOUS at 20:56

## 2021-07-04 RX ADMIN — SODIUM CHLORIDE, PRESERVATIVE FREE 10 ML: 5 INJECTION INTRAVENOUS at 08:39

## 2021-07-04 RX ADMIN — HYDROMORPHONE HYDROCHLORIDE 0.5 MG: 1 INJECTION, SOLUTION INTRAMUSCULAR; INTRAVENOUS; SUBCUTANEOUS at 20:51

## 2021-07-04 RX ADMIN — ARIPIPRAZOLE 15 MG: 15 TABLET ORAL at 08:39

## 2021-07-04 RX ADMIN — FOLIC ACID 1 MG: 1 TABLET ORAL at 08:39

## 2021-07-04 RX ADMIN — VENLAFAXINE HYDROCHLORIDE 150 MG: 75 CAPSULE, EXTENDED RELEASE ORAL at 08:40

## 2021-07-04 RX ADMIN — GABAPENTIN 300 MG: 300 CAPSULE ORAL at 21:58

## 2021-07-04 RX ADMIN — ATORVASTATIN CALCIUM 80 MG: 40 TABLET, FILM COATED ORAL at 08:40

## 2021-07-04 RX ADMIN — ASPIRIN 81 MG: 81 TABLET, FILM COATED ORAL at 08:40

## 2021-07-04 RX ADMIN — ENOXAPARIN SODIUM 40 MG: 40 INJECTION SUBCUTANEOUS at 08:40

## 2021-07-04 RX ADMIN — GABAPENTIN 300 MG: 300 CAPSULE ORAL at 05:59

## 2021-07-04 RX ADMIN — ONDANSETRON HYDROCHLORIDE 4 MG: 2 INJECTION, SOLUTION INTRAMUSCULAR; INTRAVENOUS at 20:53

## 2021-07-04 RX ADMIN — SODIUM CHLORIDE, POTASSIUM CHLORIDE, SODIUM LACTATE AND CALCIUM CHLORIDE 150 ML/HR: 600; 310; 30; 20 INJECTION, SOLUTION INTRAVENOUS at 07:23

## 2021-07-04 RX ADMIN — GABAPENTIN 300 MG: 300 CAPSULE ORAL at 14:42

## 2021-07-04 RX ADMIN — PIPERACILLIN SODIUM AND TAZOBACTAM SODIUM 3.38 G: 3; .375 INJECTION, POWDER, LYOPHILIZED, FOR SOLUTION INTRAVENOUS at 07:23

## 2021-07-04 RX ADMIN — PIPERACILLIN SODIUM AND TAZOBACTAM SODIUM 3.38 G: 3; .375 INJECTION, POWDER, LYOPHILIZED, FOR SOLUTION INTRAVENOUS at 14:42

## 2021-07-04 RX ADMIN — PIPERACILLIN SODIUM AND TAZOBACTAM SODIUM 3.38 G: 3; .375 INJECTION, POWDER, LYOPHILIZED, FOR SOLUTION INTRAVENOUS at 23:11

## 2021-07-04 RX ADMIN — INSULIN DETEMIR 20 UNITS: 100 INJECTION, SOLUTION SUBCUTANEOUS at 20:45

## 2021-07-04 RX ADMIN — SODIUM CHLORIDE, POTASSIUM CHLORIDE, SODIUM LACTATE AND CALCIUM CHLORIDE 150 ML/HR: 600; 310; 30; 20 INJECTION, SOLUTION INTRAVENOUS at 14:12

## 2021-07-04 RX ADMIN — SODIUM CHLORIDE, POTASSIUM CHLORIDE, SODIUM LACTATE AND CALCIUM CHLORIDE 150 ML/HR: 600; 310; 30; 20 INJECTION, SOLUTION INTRAVENOUS at 00:04

## 2021-07-04 RX ADMIN — FAMOTIDINE 20 MG: 10 INJECTION INTRAVENOUS at 08:40

## 2021-07-04 RX ADMIN — INSULIN ASPART 40 UNITS: 100 INJECTION, SOLUTION INTRAVENOUS; SUBCUTANEOUS at 11:05

## 2021-07-04 RX ADMIN — CLOPIDOGREL BISULFATE 75 MG: 75 TABLET ORAL at 08:40

## 2021-07-04 RX ADMIN — HYDROMORPHONE HYDROCHLORIDE 0.5 MG: 1 INJECTION, SOLUTION INTRAMUSCULAR; INTRAVENOUS; SUBCUTANEOUS at 10:02

## 2021-07-04 RX ADMIN — ONDANSETRON HYDROCHLORIDE 4 MG: 2 INJECTION, SOLUTION INTRAMUSCULAR; INTRAVENOUS at 08:53

## 2021-07-04 NOTE — PLAN OF CARE
Goal Outcome Evaluation:  Plan of Care Reviewed With: patient        Progress: improving  Outcome Summary: Levophed has remained off this shift. BP stable. Currently ST on monitor at 110. Multiple loose BMs this shift x1 incontinent episode. BS low at 53 this evening. Patient asymptomatic and able to consume oral replacement. 76 at recheck. Monitoring

## 2021-07-04 NOTE — PLAN OF CARE
Goal Outcome Evaluation:              Outcome Summary: Jose catheter placed for urine retention. Levophed gtt held briefly, but restarted at a low rate. Nausea and diarrhea much better today.

## 2021-07-04 NOTE — PROGRESS NOTES
[facility] Medicine Services  INPATIENT PROGRESS NOTE    Length of Stay: 2  Date of Admission: 7/2/2021  Primary Care Physician: Marty, BEBE Luu    Subjective   Chief Complaint: Abdominal pain    HPI: Patient states that her abdominal pain is improved but persists.  She denies vomiting today.  She is being managed for sepsis and septic shock secondary to colitis.  Her blood sugars is better controlled.    Review of Systems   Constitutional: Positive for appetite change. Negative for activity change, chills, fatigue and fever.   HENT: Negative for congestion, rhinorrhea, sore throat and trouble swallowing.    Respiratory: Negative for cough, chest tightness, shortness of breath and wheezing.    Cardiovascular: Negative for chest pain, palpitations and leg swelling.   Gastrointestinal: Positive for abdominal pain and nausea. Negative for abdominal distention, diarrhea and vomiting.   Genitourinary: Negative for difficulty urinating, dysuria and hematuria.   Musculoskeletal: Negative for arthralgias, back pain and myalgias.   Skin: Negative for pallor and rash.   Neurological: Negative for dizziness, syncope, weakness, light-headedness and headaches.   Hematological: Negative for adenopathy. Does not bruise/bleed easily.   Psychiatric/Behavioral: Negative for agitation and confusion. The patient is not nervous/anxious.      Objective    Temp:  [97.5 °F (36.4 °C)-97.8 °F (36.6 °C)] 97.5 °F (36.4 °C)  Heart Rate:  [65-91] 78  Resp:  [14-16] 16  BP: ()/(40-78) 108/57    Physical Exam  Constitutional:       General: She is not in acute distress.     Appearance: She is obese. She is not ill-appearing or diaphoretic.   HENT:      Head: Normocephalic and atraumatic.      Right Ear: External ear normal.      Left Ear: External ear normal.      Nose: No congestion or rhinorrhea.      Mouth/Throat:      Mouth: Mucous membranes are moist.      Pharynx: No oropharyngeal  exudate or posterior oropharyngeal erythema.   Eyes:      General: No scleral icterus.     Extraocular Movements: Extraocular movements intact.      Conjunctiva/sclera: Conjunctivae normal.   Cardiovascular:      Rate and Rhythm: Normal rate and regular rhythm.      Heart sounds: Normal heart sounds. No murmur heard.     Pulmonary:      Effort: Pulmonary effort is normal. No respiratory distress.      Breath sounds: Normal breath sounds. No wheezing, rhonchi or rales.   Abdominal:      General: Abdomen is flat. There is no distension.      Palpations: Abdomen is soft.      Tenderness: There is abdominal tenderness. There is no guarding.      Comments: Generalized tenderness worsening left and right lower quadrants.  No rebound.   Musculoskeletal:         General: Deformity present. No swelling or tenderness.      Cervical back: Neck supple. No rigidity. No muscular tenderness.      Right lower leg: No edema.      Left lower leg: No edema.      Comments: Left BKA present.   Lymphadenopathy:      Cervical: No cervical adenopathy.   Skin:     General: Skin is warm and dry.   Neurological:      General: No focal deficit present.      Mental Status: She is alert and oriented to person, place, and time.      Cranial Nerves: No cranial nerve deficit.      Motor: No weakness.   Psychiatric:         Mood and Affect: Mood normal.         Behavior: Behavior normal.         Thought Content: Thought content normal.       Medication Review:    Current Facility-Administered Medications:   •  acetaminophen (TYLENOL) tablet 650 mg, 650 mg, Oral, Q6H PRN, Brandon Martel MD, 650 mg at 07/03/21 1405  •  ARIPiprazole (ABILIFY) tablet 15 mg, 15 mg, Oral, Daily, Brandon Martel MD, 15 mg at 07/04/21 0839  •  aspirin EC tablet 81 mg, 81 mg, Oral, Daily, Behroozi, Saeid, MD, 81 mg at 07/04/21 0840  •  atorvastatin (LIPITOR) tablet 80 mg, 80 mg, Oral, Daily, Behroozi, Saeid, MD, 80 mg at 07/04/21 0840  •  clopidogrel (PLAVIX) tablet  75 mg, 75 mg, Oral, Daily, Behroozi, Saeid, MD, 75 mg at 07/04/21 0840  •  dextrose (D50W) 25 g/ 50mL Intravenous Solution 25 g, 25 g, Intravenous, Q15 Min PRN, Behroozi, Saeid, MD  •  dextrose (GLUTOSE) oral gel 15 g, 15 g, Oral, Q15 Min PRN, Behroozi, Saeid, MD  •  enoxaparin (LOVENOX) syringe 40 mg, 40 mg, Subcutaneous, Q24H, Behroozi, Saeid, MD, 40 mg at 07/04/21 0840  •  famotidine (PEPCID) injection 20 mg, 20 mg, Intravenous, Daily, Behroozi, Saeid, MD, 20 mg at 07/04/21 0840  •  folic acid (FOLVITE) tablet 1 mg, 1 mg, Oral, Daily, Behroozi, Saeid, MD, 1 mg at 07/04/21 0839  •  gabapentin (NEURONTIN) capsule 300 mg, 300 mg, Oral, Q8H, Brandon Martel MD, 300 mg at 07/04/21 1442  •  glucagon (human recombinant) (GLUCAGEN DIAGNOSTIC) injection 1 mg, 1 mg, Subcutaneous, Q15 Min PRN, Behroozi, Saeid, MD  •  HYDROmorphone (DILAUDID) injection 0.5 mg, 0.5 mg, Intravenous, Q4H PRN, Behroozi, Saeid, MD, 0.5 mg at 07/04/21 1002  •  HYDROmorphone (DILAUDID) injection 1 mg, 1 mg, Intravenous, Once, Froilan Martinez MD, Stopped at 07/02/21 2219  •  insulin aspart (novoLOG) injection 0-60 Units, 0-60 Units, Subcutaneous, TID AC, Brandon Martel MD, 40 Units at 07/04/21 1105  •  insulin detemir (LEVEMIR) injection 40 Units, 40 Units, Subcutaneous, Q12H, Brandon Martel MD  •  lactated ringers infusion, 150 mL/hr, Intravenous, Continuous, Brandon Martel MD, Last Rate: 150 mL/hr at 07/04/21 1412, 150 mL/hr at 07/04/21 1412  •  LORazepam (ATIVAN) tablet 0.5 mg, 0.5 mg, Oral, Q8H PRN, Brandon Martel MD  •  norepinephrine (LEVOPHED) 8 mg in 250 mL NS infusion (premix), 0.02-0.3 mcg/kg/min, Intravenous, Titrated, Brandon Martel MD, Stopped at 07/04/21 0433  •  ondansetron (ZOFRAN) injection 4 mg, 4 mg, Intravenous, Q6H PRN, Behroozi, Saeid, MD, 4 mg at 07/04/21 0853  •  piperacillin-tazobactam (ZOSYN) 3.375 g/100 mL 0.9% NS IVPB (mbp), 3.375 g, Intravenous, Q8H, Behroozi, Saeid, MD, 3.375 g at 07/04/21  1442  •  prochlorperazine (COMPAZINE) injection 5 mg, 5 mg, Intravenous, Q6H PRN, Behroozi, Saeid, MD, 5 mg at 07/03/21 1657  •  [COMPLETED] Insert peripheral IV, , , Once **AND** sodium chloride 0.9 % flush 10 mL, 10 mL, Intravenous, PRN, Froilan Martinez MD  •  sodium chloride 0.9 % flush 10 mL, 10 mL, Intravenous, Q12H, Behroozi, Saeid, MD, 10 mL at 07/04/21 0839  •  sodium chloride 0.9 % flush 10 mL, 10 mL, Intravenous, PRN, Behroozi, Saeid, MD  •  sodium chloride 0.9 % flush 10 mL, 10 mL, Intravenous, Q12H, Behroozi, Saeid, MD  •  sodium chloride 0.9 % flush 10 mL, 10 mL, Intravenous, Q12H, Behroozi, Saeid, MD  •  sodium chloride 0.9 % flush 10 mL, 10 mL, Intravenous, Q12H, Behroozi, Saeid, MD, 10 mL at 07/03/21 2039  •  sodium chloride 0.9 % flush 10 mL, 10 mL, Intravenous, PRN, Behroozi, Saeid, MD  •  sodium chloride 0.9 % flush 20 mL, 20 mL, Intravenous, PRN, Behroozi, Saeid, MD  •  tamsulosin (FLOMAX) 24 hr capsule 0.4 mg, 0.4 mg, Oral, Daily, Behroozi, Saeid, MD, 0.4 mg at 07/04/21 0839  •  venlafaxine XR (EFFEXOR-XR) 24 hr capsule 150 mg, 150 mg, Oral, Daily With Breakfast, Brandon Martel MD, 150 mg at 07/04/21 0840    I have reviewed the patient's current medications.     Results Review:  I have reviewed the labs, radiology results, and diagnostic studies.    Laboratory Data:   Results from last 7 days   Lab Units 07/04/21  0845 07/03/21  0904 07/03/21  0336 07/02/21  2202   SODIUM mmol/L 139  --  131* 133*   POTASSIUM mmol/L 3.9  --  4.4 4.0   CHLORIDE mmol/L 104  --  93* 95*   CO2 mmol/L 27.0  --  21.0* 25.0   BUN mg/dL 22*  --  34* 32*   CREATININE mg/dL 1.32*  --  2.11* 2.00*   GLUCOSE mg/dL 161* 443* 392* 241*   CALCIUM mg/dL 9.7  --  9.3 9.3   BILIRUBIN mg/dL 0.3  --  0.4 0.3   ALK PHOS U/L 103  --  139* 129*   ALT (SGPT) U/L 12  --  18 19   AST (SGOT) U/L 14  --  16 18   ANION GAP mmol/L 8.0  --  17.0* 13.0     Estimated Creatinine Clearance: 59.6 mL/min (A) (by C-G formula based on SCr of 1.32  mg/dL (H)).  Results from last 7 days   Lab Units 07/04/21  0845 07/03/21  0336   MAGNESIUM mg/dL 1.7 1.6         Results from last 7 days   Lab Units 07/04/21  0845 07/03/21  0619 07/02/21  2202   WBC 10*3/mm3 13.20* 28.39* 24.21*   HEMOGLOBIN g/dL 10.6* 12.5 11.5*   HEMATOCRIT % 32.4* 37.8 35.2   PLATELETS 10*3/mm3 330 447 463*           Culture Data:   Blood Culture   Date Value Ref Range Status   07/02/2021 No growth at 24 hours  Preliminary   07/02/2021 No growth at 24 hours  Preliminary     No results found for: URINECX  No results found for: RESPCX  No results found for: WOUNDCX  No results found for: STOOLCX  No components found for: BODYFLD    Radiology Data:   Imaging Results (Last 24 Hours)     ** No results found for the last 24 hours. **          Assessment/Plan     Hospital Problem List:  Active Problems:    Acute colitis  Septic shock  Severe sepsis secondary to colitis  Acute kidney injury  Type 2 diabetes mellitus with uncontrolled hyperglycemia  Obesity  History of coronary artery disease  Left BKA  Depression  History of essential hypertension  History of diastolic congestive heart failure    Plan  -Patient has abdominal pain in the setting of CT scan consistent with colitis.  She has septic shock and has been started on IV pressors with Levophed  -Continue aggressive IV fluid hydration with IV Ringer's lactate.  -Continue IV antibiotics with Zosyn.  Leukocytosis is trending down  -Follow-up blood cultures.  Negative at 24 hours  -Continue opiate pain medications as needed  -Monitor renal function closely  -Patient has resistant diabetes with high insulin requirement at baseline.  Will continue IV Levemir 40 units every 12 hours and custom high-dose NovoLog sliding scale for hyperglycemia  -Continue home medications for coronary artery disease including aspirin, Plavix and atorvastatin  -Hold metoprolol for now due to hypotension. Hold home medication of Lasix and hydrochlorothiazide  -Start clear  liquid diet in the morning  -DVT prophylaxis with subcutaneous Lovenox  -CODE STATUS is full code    36 minutes of critical care time was spent evaluating patient and planning treatments.    Discharge Planning: In progress    I confirmed that the patient's Advance Care Plan is present, code status is documented, or surrogate decision maker is listed in the patient's medical record.      I have utilized all available immediate resources to obtain, update, or review the patient's current medications.      Brandon Martel MD   07/04/21   15:52 CDT

## 2021-07-05 LAB
ANION GAP SERPL CALCULATED.3IONS-SCNC: 6 MMOL/L (ref 5–15)
ANISOCYTOSIS BLD QL: ABNORMAL
BUN SERPL-MCNC: 10 MG/DL (ref 6–20)
BUN/CREAT SERPL: 9.8 (ref 7–25)
CALCIUM SPEC-SCNC: 9.4 MG/DL (ref 8.6–10.5)
CHLORIDE SERPL-SCNC: 104 MMOL/L (ref 98–107)
CO2 SERPL-SCNC: 29 MMOL/L (ref 22–29)
CREAT SERPL-MCNC: 1.02 MG/DL (ref 0.57–1)
DEPRECATED RDW RBC AUTO: 46.7 FL (ref 37–54)
EOSINOPHIL # BLD MANUAL: 0.47 10*3/MM3 (ref 0–0.4)
EOSINOPHIL NFR BLD MANUAL: 4 % (ref 0.3–6.2)
ERYTHROCYTE [DISTWIDTH] IN BLOOD BY AUTOMATED COUNT: 14.4 % (ref 12.3–15.4)
GFR SERPL CREATININE-BSD FRML MDRD: 55 ML/MIN/1.73
GLUCOSE BLDC GLUCOMTR-MCNC: 174 MG/DL (ref 70–130)
GLUCOSE BLDC GLUCOMTR-MCNC: 194 MG/DL (ref 70–130)
GLUCOSE BLDC GLUCOMTR-MCNC: 200 MG/DL (ref 70–130)
GLUCOSE BLDC GLUCOMTR-MCNC: 205 MG/DL (ref 70–130)
GLUCOSE BLDC GLUCOMTR-MCNC: 411 MG/DL (ref 70–130)
GLUCOSE BLDC GLUCOMTR-MCNC: 421 MG/DL (ref 70–130)
GLUCOSE BLDC GLUCOMTR-MCNC: 464 MG/DL (ref 70–130)
GLUCOSE BLDC GLUCOMTR-MCNC: 69 MG/DL (ref 70–130)
GLUCOSE BLDC GLUCOMTR-MCNC: 95 MG/DL (ref 70–130)
GLUCOSE SERPL-MCNC: 189 MG/DL (ref 65–99)
HCT VFR BLD AUTO: 34.2 % (ref 34–46.6)
HGB BLD-MCNC: 11 G/DL (ref 12–15.9)
HYPOCHROMIA BLD QL: ABNORMAL
LYMPHOCYTES # BLD MANUAL: 3.06 10*3/MM3 (ref 0.7–3.1)
LYMPHOCYTES NFR BLD MANUAL: 25 % (ref 19.6–45.3)
LYMPHOCYTES NFR BLD MANUAL: 4 % (ref 5–12)
MCH RBC QN AUTO: 28.7 PG (ref 26.6–33)
MCHC RBC AUTO-ENTMCNC: 32.2 G/DL (ref 31.5–35.7)
MCV RBC AUTO: 89.3 FL (ref 79–97)
MONOCYTES # BLD AUTO: 0.47 10*3/MM3 (ref 0.1–0.9)
NEUTROPHILS # BLD AUTO: 7.77 10*3/MM3 (ref 1.7–7)
NEUTROPHILS NFR BLD MANUAL: 66 % (ref 42.7–76)
PLAT MORPH BLD: NORMAL
PLATELET # BLD AUTO: 323 10*3/MM3 (ref 140–450)
PMV BLD AUTO: 9.9 FL (ref 6–12)
POTASSIUM SERPL-SCNC: 4.2 MMOL/L (ref 3.5–5.2)
RBC # BLD AUTO: 3.83 10*6/MM3 (ref 3.77–5.28)
SODIUM SERPL-SCNC: 139 MMOL/L (ref 136–145)
VARIANT LYMPHS NFR BLD MANUAL: 1 % (ref 0–5)
WBC # BLD AUTO: 11.78 10*3/MM3 (ref 3.4–10.8)
WBC MORPH BLD: NORMAL

## 2021-07-05 PROCEDURE — 25010000002 ENOXAPARIN PER 10 MG: Performed by: INTERNAL MEDICINE

## 2021-07-05 PROCEDURE — 63710000001 INSULIN DETEMIR PER 5 UNITS: Performed by: INTERNAL MEDICINE

## 2021-07-05 PROCEDURE — 85025 COMPLETE CBC W/AUTO DIFF WBC: CPT | Performed by: INTERNAL MEDICINE

## 2021-07-05 PROCEDURE — 25010000002 ONDANSETRON PER 1 MG: Performed by: INTERNAL MEDICINE

## 2021-07-05 PROCEDURE — 80048 BASIC METABOLIC PNL TOTAL CA: CPT | Performed by: INTERNAL MEDICINE

## 2021-07-05 PROCEDURE — 63710000001 INSULIN ASPART PER 5 UNITS: Performed by: HOSPITALIST

## 2021-07-05 PROCEDURE — 85007 BL SMEAR W/DIFF WBC COUNT: CPT | Performed by: INTERNAL MEDICINE

## 2021-07-05 PROCEDURE — 94760 N-INVAS EAR/PLS OXIMETRY 1: CPT

## 2021-07-05 PROCEDURE — 82962 GLUCOSE BLOOD TEST: CPT

## 2021-07-05 PROCEDURE — 25010000002 HYDROMORPHONE 1 MG/ML SOLUTION: Performed by: INTERNAL MEDICINE

## 2021-07-05 PROCEDURE — 94799 UNLISTED PULMONARY SVC/PX: CPT

## 2021-07-05 PROCEDURE — 25010000002 PROCHLORPERAZINE 10 MG/2ML SOLUTION: Performed by: INTERNAL MEDICINE

## 2021-07-05 PROCEDURE — 25010000002 PIPERACILLIN SOD-TAZOBACTAM PER 1 G: Performed by: INTERNAL MEDICINE

## 2021-07-05 RX ADMIN — CLOPIDOGREL BISULFATE 75 MG: 75 TABLET ORAL at 08:20

## 2021-07-05 RX ADMIN — PIPERACILLIN SODIUM AND TAZOBACTAM SODIUM 3.38 G: 3; .375 INJECTION, POWDER, LYOPHILIZED, FOR SOLUTION INTRAVENOUS at 06:42

## 2021-07-05 RX ADMIN — GABAPENTIN 300 MG: 300 CAPSULE ORAL at 14:55

## 2021-07-05 RX ADMIN — ASPIRIN 81 MG: 81 TABLET, FILM COATED ORAL at 08:19

## 2021-07-05 RX ADMIN — INSULIN ASPART 5 UNITS: 100 INJECTION, SOLUTION INTRAVENOUS; SUBCUTANEOUS at 12:00

## 2021-07-05 RX ADMIN — SODIUM CHLORIDE, PRESERVATIVE FREE 10 ML: 5 INJECTION INTRAVENOUS at 21:14

## 2021-07-05 RX ADMIN — SODIUM CHLORIDE, POTASSIUM CHLORIDE, SODIUM LACTATE AND CALCIUM CHLORIDE 150 ML/HR: 600; 310; 30; 20 INJECTION, SOLUTION INTRAVENOUS at 03:59

## 2021-07-05 RX ADMIN — TAMSULOSIN HYDROCHLORIDE 0.4 MG: 0.4 CAPSULE ORAL at 08:19

## 2021-07-05 RX ADMIN — PROCHLORPERAZINE EDISYLATE 5 MG: 5 INJECTION INTRAMUSCULAR; INTRAVENOUS at 08:19

## 2021-07-05 RX ADMIN — HYDROMORPHONE HYDROCHLORIDE 0.5 MG: 1 INJECTION, SOLUTION INTRAMUSCULAR; INTRAVENOUS; SUBCUTANEOUS at 16:03

## 2021-07-05 RX ADMIN — SODIUM CHLORIDE, PRESERVATIVE FREE 10 ML: 5 INJECTION INTRAVENOUS at 08:20

## 2021-07-05 RX ADMIN — VENLAFAXINE HYDROCHLORIDE 150 MG: 75 CAPSULE, EXTENDED RELEASE ORAL at 08:19

## 2021-07-05 RX ADMIN — GABAPENTIN 300 MG: 300 CAPSULE ORAL at 05:31

## 2021-07-05 RX ADMIN — ATORVASTATIN CALCIUM 80 MG: 40 TABLET, FILM COATED ORAL at 08:20

## 2021-07-05 RX ADMIN — SODIUM CHLORIDE, POTASSIUM CHLORIDE, SODIUM LACTATE AND CALCIUM CHLORIDE 150 ML/HR: 600; 310; 30; 20 INJECTION, SOLUTION INTRAVENOUS at 23:16

## 2021-07-05 RX ADMIN — ONDANSETRON HYDROCHLORIDE 4 MG: 2 INJECTION, SOLUTION INTRAMUSCULAR; INTRAVENOUS at 14:55

## 2021-07-05 RX ADMIN — LORAZEPAM 0.5 MG: 0.5 TABLET ORAL at 21:14

## 2021-07-05 RX ADMIN — SODIUM CHLORIDE, PRESERVATIVE FREE 10 ML: 5 INJECTION INTRAVENOUS at 21:15

## 2021-07-05 RX ADMIN — FAMOTIDINE 20 MG: 10 INJECTION INTRAVENOUS at 08:19

## 2021-07-05 RX ADMIN — GABAPENTIN 300 MG: 300 CAPSULE ORAL at 21:14

## 2021-07-05 RX ADMIN — ENOXAPARIN SODIUM 40 MG: 40 INJECTION SUBCUTANEOUS at 08:19

## 2021-07-05 RX ADMIN — INSULIN ASPART 5 UNITS: 100 INJECTION, SOLUTION INTRAVENOUS; SUBCUTANEOUS at 16:44

## 2021-07-05 RX ADMIN — ONDANSETRON HYDROCHLORIDE 4 MG: 2 INJECTION, SOLUTION INTRAMUSCULAR; INTRAVENOUS at 04:17

## 2021-07-05 RX ADMIN — SODIUM CHLORIDE, POTASSIUM CHLORIDE, SODIUM LACTATE AND CALCIUM CHLORIDE 150 ML/HR: 600; 310; 30; 20 INJECTION, SOLUTION INTRAVENOUS at 11:30

## 2021-07-05 RX ADMIN — ARIPIPRAZOLE 15 MG: 15 TABLET ORAL at 08:19

## 2021-07-05 RX ADMIN — FOLIC ACID 1 MG: 1 TABLET ORAL at 08:20

## 2021-07-05 RX ADMIN — PIPERACILLIN SODIUM AND TAZOBACTAM SODIUM 3.38 G: 3; .375 INJECTION, POWDER, LYOPHILIZED, FOR SOLUTION INTRAVENOUS at 15:47

## 2021-07-05 RX ADMIN — INSULIN DETEMIR 40 UNITS: 100 INJECTION, SOLUTION SUBCUTANEOUS at 08:31

## 2021-07-05 RX ADMIN — PIPERACILLIN SODIUM AND TAZOBACTAM SODIUM 3.38 G: 3; .375 INJECTION, POWDER, LYOPHILIZED, FOR SOLUTION INTRAVENOUS at 23:16

## 2021-07-05 NOTE — PLAN OF CARE
Goal Outcome Evaluation:              Outcome Summary: VSS. Nausea medication given x 2. No bowel movements during night.

## 2021-07-05 NOTE — CONSULTS
Adult Nutrition  Assessment    Patient Name:  Ariana Martinez  YOB: 1960  MRN: [id number]  Admit Date:  7/2/2021    Assessment Date:  7/5/2021    Comments:  Pt's gi symptoms resolving, she tolerated clear liquids at breakfast. RD asked pt if she needed ADA diet ed prior to discharge and she insists she does not and that she follows ADA diet at home---will continue to offer to review. Will monitor.     Reason for Assessment     Row Name 07/05/21 0957          Reason for Assessment    Reason For Assessment  follow-up protocol     Diagnosis  infection/sepsis         Nutrition/Diet History     Row Name 07/05/21 0958          Nutrition/Diet History    Typical Food/Fluid Intake  Pt says she tolerated clears at breakfast this am. She thinks MD might let her try solid foods today. Pt denies need for ADA ed.         Anthropometrics     Row Name 07/05/21 0515          Anthropometrics    Weight  110 kg (243 lb 6.2 oz)         Labs/Tests/Procedures/Meds     Row Name 07/05/21 0959          Labs/Procedures/Meds    Lab Results Reviewed  reviewed, pertinent     Lab Results Comments  Gl 174, A1c 9.7        Medications    Pertinent Medications Reviewed  reviewed             Nutrition Prescription Ordered     Row Name 07/05/21 1000          Nutrition Prescription PO    Current PO Diet  Clear Liquid     Fluid Consistency  Thin     Common Modifiers  Consistent Carbohydrate                 Electronically signed by:  Karolina Ren RD  07/05/21 10:02 CDT

## 2021-07-05 NOTE — PROGRESS NOTES
[facility] Medicine Services  INPATIENT PROGRESS NOTE    Length of Stay: 3  Date of Admission: 7/2/2021  Primary Care Physician: Marty, BEBE Luu    Subjective   Chief Complaint: Abdominal pain  HPI: Patient states he still has some abdominal cramping.  It is improved, but is still present.    Review of Systems   Constitutional: Negative for appetite change, chills, fatigue, fever and unexpected weight change.   Respiratory: Negative for cough, choking, chest tightness, shortness of breath and wheezing.    Cardiovascular: Negative for chest pain, palpitations and leg swelling.   Gastrointestinal: Positive for abdominal pain. Negative for blood in stool, constipation, diarrhea, nausea and vomiting.   Genitourinary: Negative for dysuria, flank pain and hematuria.   Neurological: Negative for dizziness, seizures, syncope, speech difficulty, weakness, light-headedness, numbness and headaches.   Hematological: Does not bruise/bleed easily.        All pertinent negatives and positives are as above. All other systems have been reviewed and are negative unless otherwise stated.     Objective    Temp:  [97.2 °F (36.2 °C)-98.1 °F (36.7 °C)] 97.3 °F (36.3 °C)  Heart Rate:  [] 77  Resp:  [16-18] 18  BP: (105-164)/(51-71) 164/70    Physical Exam  Vitals reviewed.   Constitutional:       Appearance: She is well-developed.   HENT:      Head: Normocephalic and atraumatic.   Eyes:      Pupils: Pupils are equal, round, and reactive to light.   Cardiovascular:      Rate and Rhythm: Normal rate and regular rhythm.      Heart sounds: Normal heart sounds. No murmur heard.   No friction rub. No gallop.    Pulmonary:      Effort: Pulmonary effort is normal. No respiratory distress.      Breath sounds: Normal breath sounds. No wheezing or rales.   Chest:      Chest wall: No tenderness.   Abdominal:      General: Bowel sounds are normal. There is no distension.      Palpations: Abdomen  is soft.      Tenderness: There is generalized abdominal tenderness. There is no guarding.   Musculoskeletal:      Cervical back: Normal range of motion and neck supple.      Comments: Left BKA dressing clean dry and intact   Skin:     General: Skin is warm and dry.   Psychiatric:         Behavior: Behavior normal.         Thought Content: Thought content normal.         Results Review:  I have reviewed the labs, radiology results, and diagnostic studies.    Laboratory Data:   Results from last 7 days   Lab Units 07/05/21  0412 07/04/21  0845 07/03/21  0904 07/03/21  0336 07/02/21  2202   SODIUM mmol/L 139 139  --  131* 133*   POTASSIUM mmol/L 4.2 3.9  --  4.4 4.0   CHLORIDE mmol/L 104 104  --  93* 95*   CO2 mmol/L 29.0 27.0  --  21.0* 25.0   BUN mg/dL 10 22*  --  34* 32*   CREATININE mg/dL 1.02* 1.32*  --  2.11* 2.00*   GLUCOSE mg/dL 189* 161* 443* 392* 241*   CALCIUM mg/dL 9.4 9.7  --  9.3 9.3   BILIRUBIN mg/dL  --  0.3  --  0.4 0.3   ALK PHOS U/L  --  103  --  139* 129*   ALT (SGPT) U/L  --  12  --  18 19   AST (SGOT) U/L  --  14  --  16 18   ANION GAP mmol/L 6.0 8.0  --  17.0* 13.0     Estimated Creatinine Clearance: 77.2 mL/min (A) (by C-G formula based on SCr of 1.02 mg/dL (H)).  Results from last 7 days   Lab Units 07/04/21  0845 07/03/21  0336   MAGNESIUM mg/dL 1.7 1.6         Results from last 7 days   Lab Units 07/05/21  0412 07/04/21  0845 07/03/21  0619 07/02/21  2202   WBC 10*3/mm3 11.78* 13.20* 28.39* 24.21*   HEMOGLOBIN g/dL 11.0* 10.6* 12.5 11.5*   HEMATOCRIT % 34.2 32.4* 37.8 35.2   PLATELETS 10*3/mm3 323 330 447 463*           Culture Data:   Blood Culture   Date Value Ref Range Status   07/02/2021 No growth at 2 days  Preliminary   07/02/2021 No growth at 2 days  Preliminary     No results found for: URINECX  No results found for: RESPCX  No results found for: WOUNDCX  No results found for: STOOLCX  No components found for: BODYFLD    Radiology Data:   Imaging Results (Last 24 Hours)     ** No  results found for the last 24 hours. **          I have reviewed the patient's current medications.     Assessment/Plan     Active Hospital Problems    Diagnosis    • Acute colitis        Plan:  1.  Acute colitis: Continue IV fluids and antibiotics.  2.  Septic shock: Resolved.  3.  Acute kidney injury: Significantly improved.  Creatinine near baseline.  4.  Diabetes mellitus: Continue current treatment.  Glucose less than 200 for the most part.  5.  Left BKA: Okay to take staples out tomorrow.  6.  DVT prophylaxis: Lovenox.        The patient was evaluated during the global COVID-19 pandemic, and the diagnosis was suspected/considered upon their initial presentation.  Evaluation, treatment, and testing were consistent with current guidelines for patients who present with complaints or symptoms that may be related to COVID-19.    I confirmed that the patient's Advance Care Plan is present, code status is documented, or surrogate decision maker is listed in the patient's medical record.       Discharge Planning: I expect patient to be discharged to home in 2-3 days.        This document has been electronically signed by Mahin Esteves MD on July 5, 2021 18:11 CDT

## 2021-07-05 NOTE — PLAN OF CARE
Goal Outcome Evaluation:  Plan of Care Reviewed With: patient        Progress: no change  Outcome Summary: transfer from CCU; alert and oriented; vss; working on pain control; will continue to monitor

## 2021-07-05 NOTE — NURSING NOTE
Spoke with Dr. Esteves in regards to advancing pt diet as tolerated, MD agreed. Informed MD that pt has bladder stimulator and has acute urinary retention. Asked if MD would like urology to come see her. MD stated that we will look at that more when her colitis gets better. Also informed MD of pt staples from [facility] prior to this admission that they are to be removed tomorrow. MD stated he will assess the patient further before making that decision. Will continue to monitor

## 2021-07-05 NOTE — PLAN OF CARE
Goal Outcome Evaluation:  Plan of Care Reviewed With: patient        Progress: improving  Outcome Summary: Transfer orders received. Pt will transfer to  381. Report called to recieving RN. VSS. A/O. Rebeca continues with adequate UOP.  Problem: Infection (Sepsis/Septic Shock)  Goal: Absence of Infection Signs and Symptoms  Outcome: Unable to Meet, Plan Revised

## 2021-07-06 LAB
ANION GAP SERPL CALCULATED.3IONS-SCNC: 9 MMOL/L (ref 5–15)
BASOPHILS # BLD AUTO: 0.03 10*3/MM3 (ref 0–0.2)
BASOPHILS NFR BLD AUTO: 0.3 % (ref 0–1.5)
BUN SERPL-MCNC: 4 MG/DL (ref 6–20)
BUN/CREAT SERPL: 5 (ref 7–25)
CALCIUM SPEC-SCNC: 9.2 MG/DL (ref 8.6–10.5)
CHLORIDE SERPL-SCNC: 103 MMOL/L (ref 98–107)
CO2 SERPL-SCNC: 26 MMOL/L (ref 22–29)
CREAT SERPL-MCNC: 0.8 MG/DL (ref 0.57–1)
DEPRECATED RDW RBC AUTO: 45.4 FL (ref 37–54)
EOSINOPHIL # BLD AUTO: 0.43 10*3/MM3 (ref 0–0.4)
EOSINOPHIL NFR BLD AUTO: 4.7 % (ref 0.3–6.2)
ERYTHROCYTE [DISTWIDTH] IN BLOOD BY AUTOMATED COUNT: 14.2 % (ref 12.3–15.4)
GFR SERPL CREATININE-BSD FRML MDRD: 73 ML/MIN/1.73
GLUCOSE BLDC GLUCOMTR-MCNC: 175 MG/DL (ref 70–130)
GLUCOSE BLDC GLUCOMTR-MCNC: 197 MG/DL (ref 70–130)
GLUCOSE BLDC GLUCOMTR-MCNC: 226 MG/DL (ref 70–130)
GLUCOSE BLDC GLUCOMTR-MCNC: 229 MG/DL (ref 70–130)
GLUCOSE SERPL-MCNC: 173 MG/DL (ref 65–99)
HCT VFR BLD AUTO: 31.2 % (ref 34–46.6)
HGB BLD-MCNC: 10.1 G/DL (ref 12–15.9)
IMM GRANULOCYTES # BLD AUTO: 0.03 10*3/MM3 (ref 0–0.05)
IMM GRANULOCYTES NFR BLD AUTO: 0.3 % (ref 0–0.5)
LYMPHOCYTES # BLD AUTO: 2.55 10*3/MM3 (ref 0.7–3.1)
LYMPHOCYTES NFR BLD AUTO: 27.7 % (ref 19.6–45.3)
MCH RBC QN AUTO: 28.5 PG (ref 26.6–33)
MCHC RBC AUTO-ENTMCNC: 32.4 G/DL (ref 31.5–35.7)
MCV RBC AUTO: 88.1 FL (ref 79–97)
MONOCYTES # BLD AUTO: 0.64 10*3/MM3 (ref 0.1–0.9)
MONOCYTES NFR BLD AUTO: 7 % (ref 5–12)
NEUTROPHILS NFR BLD AUTO: 5.51 10*3/MM3 (ref 1.7–7)
NEUTROPHILS NFR BLD AUTO: 60 % (ref 42.7–76)
NRBC BLD AUTO-RTO: 0 /100 WBC (ref 0–0.2)
PLATELET # BLD AUTO: 328 10*3/MM3 (ref 140–450)
PMV BLD AUTO: 9.8 FL (ref 6–12)
POTASSIUM SERPL-SCNC: 3.4 MMOL/L (ref 3.5–5.2)
RBC # BLD AUTO: 3.54 10*6/MM3 (ref 3.77–5.28)
SODIUM SERPL-SCNC: 138 MMOL/L (ref 136–145)
WBC # BLD AUTO: 9.19 10*3/MM3 (ref 3.4–10.8)

## 2021-07-06 PROCEDURE — 80048 BASIC METABOLIC PNL TOTAL CA: CPT | Performed by: INTERNAL MEDICINE

## 2021-07-06 PROCEDURE — 85025 COMPLETE CBC W/AUTO DIFF WBC: CPT | Performed by: INTERNAL MEDICINE

## 2021-07-06 PROCEDURE — 25010000002 HYDROMORPHONE 1 MG/ML SOLUTION: Performed by: INTERNAL MEDICINE

## 2021-07-06 PROCEDURE — 25010000002 ENOXAPARIN PER 10 MG: Performed by: INTERNAL MEDICINE

## 2021-07-06 PROCEDURE — 63710000001 INSULIN DETEMIR PER 5 UNITS: Performed by: INTERNAL MEDICINE

## 2021-07-06 PROCEDURE — 82962 GLUCOSE BLOOD TEST: CPT

## 2021-07-06 PROCEDURE — 63710000001 INSULIN ASPART PER 5 UNITS: Performed by: HOSPITALIST

## 2021-07-06 PROCEDURE — 25010000002 PIPERACILLIN SOD-TAZOBACTAM PER 1 G: Performed by: INTERNAL MEDICINE

## 2021-07-06 RX ORDER — LISINOPRIL 20 MG/1
20 TABLET ORAL
Status: DISCONTINUED | OUTPATIENT
Start: 2021-07-06 | End: 2021-07-12 | Stop reason: HOSPADM

## 2021-07-06 RX ADMIN — ASPIRIN 81 MG: 81 TABLET, FILM COATED ORAL at 07:20

## 2021-07-06 RX ADMIN — SODIUM CHLORIDE, PRESERVATIVE FREE 10 ML: 5 INJECTION INTRAVENOUS at 21:16

## 2021-07-06 RX ADMIN — GABAPENTIN 300 MG: 300 CAPSULE ORAL at 15:26

## 2021-07-06 RX ADMIN — TAMSULOSIN HYDROCHLORIDE 0.4 MG: 0.4 CAPSULE ORAL at 07:20

## 2021-07-06 RX ADMIN — LISINOPRIL 20 MG: 20 TABLET ORAL at 18:06

## 2021-07-06 RX ADMIN — FOLIC ACID 1 MG: 1 TABLET ORAL at 07:20

## 2021-07-06 RX ADMIN — CLOPIDOGREL BISULFATE 75 MG: 75 TABLET ORAL at 07:20

## 2021-07-06 RX ADMIN — GABAPENTIN 300 MG: 300 CAPSULE ORAL at 21:15

## 2021-07-06 RX ADMIN — ACETAMINOPHEN 650 MG: 325 TABLET, FILM COATED ORAL at 09:18

## 2021-07-06 RX ADMIN — LORAZEPAM 0.5 MG: 0.5 TABLET ORAL at 22:26

## 2021-07-06 RX ADMIN — INSULIN DETEMIR 40 UNITS: 100 INJECTION, SOLUTION SUBCUTANEOUS at 21:17

## 2021-07-06 RX ADMIN — PIPERACILLIN SODIUM AND TAZOBACTAM SODIUM 3.38 G: 3; .375 INJECTION, POWDER, LYOPHILIZED, FOR SOLUTION INTRAVENOUS at 06:10

## 2021-07-06 RX ADMIN — SODIUM CHLORIDE, PRESERVATIVE FREE 10 ML: 5 INJECTION INTRAVENOUS at 21:15

## 2021-07-06 RX ADMIN — SODIUM CHLORIDE, POTASSIUM CHLORIDE, SODIUM LACTATE AND CALCIUM CHLORIDE 150 ML/HR: 600; 310; 30; 20 INJECTION, SOLUTION INTRAVENOUS at 16:58

## 2021-07-06 RX ADMIN — HYDROMORPHONE HYDROCHLORIDE 0.5 MG: 1 INJECTION, SOLUTION INTRAMUSCULAR; INTRAVENOUS; SUBCUTANEOUS at 15:32

## 2021-07-06 RX ADMIN — SODIUM CHLORIDE, PRESERVATIVE FREE 10 ML: 5 INJECTION INTRAVENOUS at 07:21

## 2021-07-06 RX ADMIN — VENLAFAXINE HYDROCHLORIDE 150 MG: 75 CAPSULE, EXTENDED RELEASE ORAL at 07:20

## 2021-07-06 RX ADMIN — ATORVASTATIN CALCIUM 80 MG: 40 TABLET, FILM COATED ORAL at 07:20

## 2021-07-06 RX ADMIN — FAMOTIDINE 20 MG: 10 INJECTION INTRAVENOUS at 07:20

## 2021-07-06 RX ADMIN — SODIUM CHLORIDE, POTASSIUM CHLORIDE, SODIUM LACTATE AND CALCIUM CHLORIDE 100 ML/HR: 600; 310; 30; 20 INJECTION, SOLUTION INTRAVENOUS at 21:25

## 2021-07-06 RX ADMIN — HYDROMORPHONE HYDROCHLORIDE 0.5 MG: 1 INJECTION, SOLUTION INTRAMUSCULAR; INTRAVENOUS; SUBCUTANEOUS at 21:20

## 2021-07-06 RX ADMIN — INSULIN ASPART 5 UNITS: 100 INJECTION, SOLUTION INTRAVENOUS; SUBCUTANEOUS at 11:53

## 2021-07-06 RX ADMIN — INSULIN DETEMIR 40 UNITS: 100 INJECTION, SOLUTION SUBCUTANEOUS at 07:21

## 2021-07-06 RX ADMIN — PIPERACILLIN SODIUM AND TAZOBACTAM SODIUM 3.38 G: 3; .375 INJECTION, POWDER, LYOPHILIZED, FOR SOLUTION INTRAVENOUS at 22:26

## 2021-07-06 RX ADMIN — ARIPIPRAZOLE 15 MG: 15 TABLET ORAL at 07:20

## 2021-07-06 RX ADMIN — ENOXAPARIN SODIUM 40 MG: 40 INJECTION SUBCUTANEOUS at 07:21

## 2021-07-06 RX ADMIN — SODIUM CHLORIDE, POTASSIUM CHLORIDE, SODIUM LACTATE AND CALCIUM CHLORIDE 150 ML/HR: 600; 310; 30; 20 INJECTION, SOLUTION INTRAVENOUS at 07:21

## 2021-07-06 RX ADMIN — PIPERACILLIN SODIUM AND TAZOBACTAM SODIUM 3.38 G: 3; .375 INJECTION, POWDER, LYOPHILIZED, FOR SOLUTION INTRAVENOUS at 15:26

## 2021-07-06 RX ADMIN — GABAPENTIN 300 MG: 300 CAPSULE ORAL at 06:10

## 2021-07-06 NOTE — PLAN OF CARE
Goal Outcome Evaluation:  Plan of Care Reviewed With: patient        Progress: improving  Outcome Summary: vss; no new complaints; will continue to monitor

## 2021-07-06 NOTE — PAYOR COMM NOTE
"Request for inpatient admission  Pending auth # [id number]  Juany Lo RN,[facility]  [phone] phone  [phone] fax            Ariana Bailey (61 y.o. Female)     Date of Birth Social Security Number Address Home Phone MRN    1960  [id number]  [place] [phone] [id number]    Mosque Marital Status          Muslim        Admission Date Admission Type Admitting Provider Attending Provider Department, Room/Bed    7/2/21 Emergency Behroozi, Saeid, MD Ebenibo, Sotonte E, MD [facility], 381/1    Discharge Date Discharge Disposition Discharge Destination                       Attending Provider: Brandon Martel MD    Allergies: Seroquel [Quetiapine], Avandia [Rosiglitazone], Morphine And Related, Oxycodone    Isolation: None   Infection: None   Code Status: CPR    Ht: 172.7 cm (68\")   Wt: 110 kg (243 lb)    Admission Cmt: None   Principal Problem: None                Active Insurance as of 7/2/2021     Primary Coverage     Payor Plan Insurance Group Employer/Plan Group    Formerly [facility] BLUE [employer]OSS [facility] [employer] EMPLOYEE [id number]     Payor Plan Address Payor Plan Phone Number Payor Plan Fax Number Effective Dates    [address] [phone]  1/1/2015 - None Entered    [place] [id number]       Subscriber Name Subscriber Birth Date Member ID       ARIANA BAILEY 1960 [id number]                 Emergency Contacts      (Rel.) Home Phone Work Phone Mobile Phone    Nadeem Bailey (Spouse) [phone] -- [phone]    Елена David (Sister) [phone] -- [phone]               History & Physical      Behroozi, Saeid, MD at 07/02/21 2334                [facility] Medicine Admission      Date of Admission: 7/2/2021      Primary Care Physician: Kimberly Ferguson APRN      Chief Complaint: Abdominal pain,    HPI:  Patient is a 61-year-old obese multimorbid female with known past " medical history of coronary artery disease, CABG in 2005, diabetes mellitus, hypertension, left below-knee amputation, congestive heart failure rate, dyslipidemia who presented to the ER with complaint of abdominal pain for 2 days.  She was hypotensive was given IV fluid, central line was placed in ED and  underwent CT of the abdomen and pelvis which was concerning for colitis.  Pathology service was called for admission of the patient.  He was seen and examined in ED room 6.  Reported for the last 3 days she has mid abdominal pain.  Had nausea and episode of vomiting nonbilious nonbloody.  She had more frequent bowel movements treat today with diarrhea.  She denied any fall injury trauma, fever chills shortness of breath.  In ED she reported chest pain which she did not report to me.  She denies being on any antibiotic outpatient.  She had hospitalization in May 2021.  She mobilized with wheelchair at home    Concurrent Medical History:  has a past medical history of Angina, class IV (CMS/HCC), Anxiety, Anxiety and depression, Arthritis, Benign paroxysmal positional vertigo, Bladder disorder, Carpal tunnel syndrome, CHF (congestive heart failure) (CMS/HCC), Chronic pain, Coronary atherosclerosis, Depression, Diabetes mellitus (CMS/HCC), Diabetic polyneuropathy (CMS/HCC), Disease of thyroid gland, Elevated cholesterol, Female stress incontinence, Foot pain, left, Full dentures, Gastroparesis, GERD (gastroesophageal reflux disease), Hyperlipidemia, Hypertension, Low back pain, Malaise and fatigue, Multiple joint pain, Obesity, Occlusion and stenosis of bilateral carotid arteries (6/18/2021), Otalgia, Perforation of tympanic membrane, Postoperative wound infection, Vitamin D deficiency, and Wears glasses.    Past Surgical History:  has a past surgical history that includes Abdominal surgery; Angioplasty; Coronary artery bypass graft (2005); Cardiac catheterization; Carpal tunnel release; Cholecystectomy; endoscopy and  colonoscopy; Mouth surgery; transesophageal echocardiogram (mildred); Foot surgery; gastric banding; Hysterectomy; Shoulder surgery; Salpingoophorectomy; Cardiac catheterization (N/A, 6/20/2017); Breast biopsy (Right); Esophagogastroduodenoscopy (N/A, 10/19/2018); subtalar arthrodesis (Left, 1/16/2019); subtalar arthrodesis (Left, 10/16/2019); Foot surgery; Cardiac catheterization (N/A, 2/18/2020); Esophagogastroduodenoscopy (N/A, 6/24/2020); Colonoscopy (N/A, 6/24/2020); subtalar arthrodesis (Left, 9/30/2020); Ankle Arthroscopy (Left, 2/26/2021); incision and drainage leg (Left, 3/12/2021); and Foot Amputation.    Family History: family history includes Diabetes in her sister, sister, sister, sister, sister, sister and another family member; Heart disease in her mother, sister, sister, and another family member; Hypertension in her mother, sister, sister, and another family member; Stroke in her mother.     Social History:  reports that she has never smoked. She has never used smokeless tobacco. She reports that she does not drink alcohol and does not use drugs.    Allergies:   Allergies   Allergen Reactions   • Seroquel [Quetiapine] Anaphylaxis   • Avandia [Rosiglitazone] Swelling   • Morphine And Related Hallucinations   • Oxycodone Hallucinations       Medications:   Prior to Admission medications    Medication Sig Start Date End Date Taking? Authorizing Provider   ARIPiprazole (ABILIFY) 15 MG tablet TAKE 1 TABLET BY MOUTH EVERY DAY 5/4/21   Ferguson, BEBE Luu   aspirin (aspirin) 81 MG EC tablet Take 81 mg by mouth Daily.    Provider, MD Esther   atorvastatin (LIPITOR) 80 MG tablet TAKE 1 TABLET BY MOUTH EVERY DAY 6/14/21   Marty, BEBE Luu   BD SHARPS CONTAINER HOME misc 1 each Take As Directed. 9/7/18   Francisca Fong MD   Blood Glucose Monitoring Suppl (ONE TOUCH ULTRA MINI) w/Device kit USE AS DIRECTED TO CHECK BLOOD SUGAR 7/11/18   Francisca Fong MD   Calcium Citrate-Vitamin D  (CITRACAL/VITAMIN D) 250-200 MG-UNIT tablet Take 2 tablets by mouth 2 (two) times a day.    ProviderEsther MD   clopidogrel (PLAVIX) 75 MG tablet TAKE 1 TABLET BY MOUTH EVERY DAY 6/14/21   Kimberly Ferguson APRN   cyanocobalamin 1000 MCG/ML injection INJECT 1 ML INTO THE APPROPRIATE MUSCLE AS DIRECTED BY PRESCRIBER EVERY 28 (TWENTY-EIGHT) DAYS. 5/5/21   Geri Baig MD   folic acid (FOLVITE) 1 MG tablet Take 1 tablet by mouth Daily. 4/22/21   Teressa Hammond APRN   furosemide (LASIX) 40 MG tablet TAKE 1 TABLET BY MOUTH EVERY DAY 4/5/21   Kimberly Ferguson APRN   gabapentin (NEURONTIN) 400 MG capsule Take 1 capsule by mouth 3 (Three) Times a Day. 4/20/21   Kimberly Ferguson APRN   GLUCAGON EMERGENCY 1 MG injection USE AS DIRECTED AS NEEDED 7/28/17   Elvis Gamboa APRN   glucose blood (Accu-Chek Iris Plus) test strip USE TO CHECK BLOOD SUGAR 4 TIMES DAILY. E11.9 4/12/21   Elvis Gamboa APRN   glucose monitor monitoring kit 1 each Daily. accucheck iris meter, E11.9 6/11/20   Elvis Gamboa APRN   hydroCHLOROthiazide (HYDRODIURIL) 25 MG tablet Take 25 mg by mouth Daily. 5/18/21   Esther Henao MD   HYDROcodone-acetaminophen (NORCO) 7.5-325 MG per tablet Take 1 tablet by mouth Every 6 (Six) Hours As Needed for Moderate Pain . 6/24/21   Kimberly Ferguson APRN   Insulin Glargine (BASAGLAR KWIKPEN) 100 UNIT/ML injection pen Inject 60 Units under the skin into the appropriate area as directed Every Night.    Esther Henao MD   Insulin Pen Needle (B-D ULTRAFINE III SHORT PEN) 31G X 8 MM misc USE TO INJECT 4 TIMES A DAY 12/27/20   Elvis Gamboa APRN   lisinopril (PRINIVIL,ZESTRIL) 20 MG tablet Take 1 tablet by mouth Daily. 6/24/21   Kimberly Ferguson APRN   LORazepam (ATIVAN) 0.5 MG tablet Take 1 tablet by mouth Every 8 (Eight) Hours As Needed for Anxiety. Frequency and # increased on 6/24/21. Refill early accordingly. 6/24/21   Kimberly Ferguson,  "BEBE   meclizine (ANTIVERT) 25 MG tablet Take 1 tablet by mouth 3 (Three) Times a Day As Needed for dizziness. 12/14/17   Evon Hernandez APRN   methocarbamol (Robaxin) 500 MG tablet Take 1 tablet by mouth 2 (Two) Times a Day As Needed for Muscle Spasms. 6/24/21   Kimberly Ferguson APRN   metoclopramide (REGLAN) 10 MG tablet TAKE 1 TABLET BY MOUTH FOUR TIMES A DAY AS NEEDED 5/17/21   Marcela Lawson APRN   metoprolol succinate XL (Toprol XL) 50 MG 24 hr tablet Take 1 tablet by mouth Daily. Dose increased 5/24/21 5/24/21   Kimberly Ferguson APRN   mirtazapine (Remeron) 30 MG tablet Take 1 tablet by mouth Every Night. 6/28/21   Kimberly Ferguson APRN   naloxone (NARCAN) 0.4 MG/ML injection Infuse 0.5 mL into a venous catheter Every 5 (Five) Minutes As Needed for Opioid Reversal. 3/13/21   Nick Faye MD   nitroglycerin (NITROSTAT) 0.4 MG SL tablet Place 1 tablet under the tongue Every 5 (Five) Minutes As Needed for Chest Pain. Take no more than 3 doses in 15 minutes. 4/29/21   Kimberly Ferguson APRN   NOVOLOG FLEXPEN 100 UNIT/ML solution pen-injector sc pen INJECT 60 UNITS BEFORE MEALS 3 TIMES A DAY 4/6/20   Baldemar Melendez MD   ondansetron (ZOFRAN) 8 MG tablet TAKE 1 TABLET BY MOUTH EVERY 8 (EIGHT) HOURS AS NEEDED FOR NAUSEA OR VOMITING. 1/4/21   Kimberly Ferguson APRN   pantoprazole (PROTONIX) 20 MG EC tablet TAKE 1 TABLET BY MOUTH EVERY DAY 12/23/20   Geri Baig MD   Syringe/Needle, Disp, (Luer Lock Safety Syringes) 25G X 5/8\" 3 ML misc 1 each Daily. 1 Q28 DAYS 4/16/20   Kimberly Ferguson APRN   venlafaxine XR (EFFEXOR-XR) 150 MG 24 hr capsule TAKE 1 CAPSULE BY MOUTH TWICE A DAY 6/1/21   Kimberly Ferguson APRN   vitamin D (ERGOCALCIFEROL) 1.25 MG (30189 UT) capsule capsule TAKE ONE CAPSULE BY MOUTH EVERY SUNDAY. 4/20/21   Ferguson, Kimberly Ventura, APRN       Review of Systems:  Review of Systems   Constitutional: Negative for chills, diaphoresis, fatigue and fever.        Morbid " obesity   HENT: Negative for congestion, dental problem, ear pain, facial swelling, rhinorrhea and sinus pressure.    Eyes: Negative for photophobia, discharge, redness, itching and visual disturbance.   Respiratory: Positive for cough. Negative for apnea, choking, chest tightness, shortness of breath, wheezing and stridor.    Cardiovascular: Negative for chest pain, palpitations and leg swelling.   Gastrointestinal: Positive for abdominal pain, diarrhea, nausea and vomiting. Negative for abdominal distention, anal bleeding, blood in stool and rectal pain.   Endocrine: Negative for cold intolerance, heat intolerance, polydipsia, polyphagia and polyuria.   Genitourinary: Negative for difficulty urinating, flank pain, frequency, hematuria and urgency.   Musculoskeletal: Negative for arthralgias, back pain, joint swelling and myalgias.        Recent left BKA with staples   Skin: Negative for pallor, rash and wound.   Allergic/Immunologic: Negative for environmental allergies and immunocompromised state.   Neurological: Negative for dizziness, tremors, seizures, facial asymmetry, speech difficulty, weakness, light-headedness, numbness and headaches.   Hematological: Negative for adenopathy. Does not bruise/bleed easily.   Psychiatric/Behavioral: Negative for agitation, behavioral problems and hallucinations. The patient is not nervous/anxious.       Otherwise complete ROS is negative except as mentioned above.    Physical Exam:   Temp:  [98.3 °F (36.8 °C)] 98.3 °F (36.8 °C)  Heart Rate:  [70-91] 72  Resp:  [14-18] 14  BP: ()/(34-59) 115/57  Physical Exam  Constitutional:       General: She is not in acute distress.     Appearance: She is normal weight. She is toxic-appearing. She is not ill-appearing or diaphoretic.      Comments: Morbid obese   HENT:      Head: Normocephalic and atraumatic.      Right Ear: External ear normal.      Left Ear: External ear normal.      Nose: Nose normal.      Mouth/Throat:       Mouth: Mucous membranes are moist.      Pharynx: Oropharynx is clear.   Eyes:      Extraocular Movements: Extraocular movements intact.      Conjunctiva/sclera: Conjunctivae normal.      Pupils: Pupils are equal, round, and reactive to light.   Cardiovascular:      Rate and Rhythm: Normal rate and regular rhythm.      Heart sounds: No murmur heard.   No friction rub. No gallop.    Pulmonary:      Effort: No respiratory distress.      Breath sounds: No stridor. No wheezing or rales.   Chest:      Chest wall: No tenderness.   Abdominal:      General: Abdomen is flat. There is no distension.      Palpations: Abdomen is soft.      Tenderness: There is abdominal tenderness. There is no guarding or rebound.      Comments: Obese   Musculoskeletal:         General: No swelling or tenderness.      Cervical back: No rigidity or tenderness.      Right lower leg: No edema.      Left lower leg: No edema.      Comments: Left BKA with close wound and staples present   Lymphadenopathy:      Cervical: No cervical adenopathy.   Skin:     General: Skin is warm and dry.      Coloration: Skin is not jaundiced.      Findings: No erythema.   Neurological:      Mental Status: She is alert and oriented to person, place, and time. Mental status is at baseline.      Sensory: No sensory deficit.      Motor: No weakness.      Coordination: Coordination normal.   Psychiatric:         Mood and Affect: Mood normal.         Behavior: Behavior normal.         Judgment: Judgment normal.     IV access line: left neck central line present.      Results Reviewed:  I have personally reviewed current lab, radiology, and data and agree with results.  Lab Results (last 24 hours)     Procedure Component Value Units Date/Time    COVID-19 and FLU A/B PCR - Swab, Nasopharynx [[id number]] Collected: 07/02/21 2326    Specimen: Swab from Nasopharynx Updated: 07/02/21 2327    Extra Tubes [[id number]] Collected: 07/02/21 2202    Specimen: Blood, Venous Line Updated:  07/02/21 2315    Narrative:      The following orders were created for panel order Extra Tubes.  Procedure                               Abnormality         Status                     ---------                               -----------         ------                     Gold Top - SST[[id number]]                                   Final result                 Please view results for these tests on the individual orders.    Gold Top - SST [[id number]] Collected: 07/02/21 2202    Specimen: Blood Updated: 07/02/21 2315     Extra Tube Hold for add-ons.     Comment: Auto resulted.       Blood Culture - Blood, Blood, Central Line [[id number]] Collected: 07/02/21 2242    Specimen: Blood, Central Line Updated: 07/02/21 2245    Comprehensive Metabolic Panel [[id number]]  (Abnormal) Collected: 07/02/21 2202    Specimen: Blood Updated: 07/02/21 2230     Glucose 241 mg/dL      BUN 32 mg/dL      Creatinine 2.00 mg/dL      Sodium 133 mmol/L      Potassium 4.0 mmol/L      Chloride 95 mmol/L      CO2 25.0 mmol/L      Calcium 9.3 mg/dL      Total Protein 6.8 g/dL      Albumin 3.50 g/dL      ALT (SGPT) 19 U/L      AST (SGOT) 18 U/L      Alkaline Phosphatase 129 U/L      Total Bilirubin 0.3 mg/dL      eGFR Non African Amer 26 mL/min/1.73      Globulin 3.3 gm/dL      A/G Ratio 1.1 g/dL      BUN/Creatinine Ratio 16.0     Anion Gap 13.0 mmol/L     Narrative:      GFR Normal >60  Chronic Kidney Disease <60  Kidney Failure <15      Lipase [[id number]]  (Normal) Collected: 07/02/21 2202    Specimen: Blood Updated: 07/02/21 2230     Lipase 14 U/L     Lactic Acid, Plasma [[id number]]  (Normal) Collected: 07/02/21 2202    Specimen: Blood Updated: 07/02/21 2226     Lactate 1.5 mmol/L     CBC & Differential [[id number]]  (Abnormal) Collected: 07/02/21 2202    Specimen: Blood Updated: 07/02/21 2220    Narrative:      The following orders were created for panel order CBC & Differential.  Procedure                               Abnormality         Status                      ---------                               -----------         ------                     CBC Auto Differential[[id number]]        Abnormal            Final result                 Please view results for these tests on the individual orders.    CBC Auto Differential [[id number]]  (Abnormal) Collected: 07/02/21 2202    Specimen: Blood Updated: 07/02/21 2220     WBC 24.21 10*3/mm3      RBC 4.15 10*6/mm3      Hemoglobin 11.5 g/dL      Hematocrit 35.2 %      MCV 84.8 fL      MCH 27.7 pg      MCHC 32.7 g/dL      RDW 14.2 %      RDW-SD 43.9 fl      MPV 9.9 fL      Platelets 463 10*3/mm3      Neutrophil % 84.4 %      Lymphocyte % 7.6 %      Monocyte % 5.7 %      Eosinophil % 0.8 %      Basophil % 0.4 %      Immature Grans % 1.1 %      Neutrophils, Absolute 20.45 10*3/mm3      Lymphocytes, Absolute 1.83 10*3/mm3      Monocytes, Absolute 1.37 10*3/mm3      Eosinophils, Absolute 0.20 10*3/mm3      Basophils, Absolute 0.09 10*3/mm3      Immature Grans, Absolute 0.27 10*3/mm3      nRBC 0.0 /100 WBC     Blood Culture - Blood, Blood, Central Line [[id number]] Collected: 07/02/21 2201    Specimen: Blood, Central Line Updated: 07/02/21 2210    POC Glucose Once [[id number]]  (Abnormal) Collected: 07/02/21 1922    Specimen: Blood Updated: 07/02/21 1934     Glucose 169 mg/dL      Comment: RN NotifiedOperator: [id number] KAREN [facility] ID: [id number]           Imaging Results (Last 24 Hours)     Procedure Component Value Units Date/Time    CT Abdomen Pelvis With Contrast [[id number]] Collected: 07/02/21 2200     Updated: 07/02/21 2255    Narrative:      CT ABDOMEN PELVIS WITH IV CONTRAST    INDICATION: 61 years Female; abdominal pain    TECHNIQUE:  CT scan of the abdomen and pelvis was performed with  IV contrast.  This exam was performed according to our  departmental dose-optimization program, which includes automated  exposure control, adjustment of the mA and/or kV according to  patient size and/or use of iterative  reconstruction technique.     Comparison: 11/27/2017.    FINDINGS:  Liver: Unremarkable.  Gallbladder/Biliary tree: Status post cholecystectomy. No  significant biliary dilatation. Pneumobilia is probably within  normal limits for previous sphincterotomy.   Pancreas: Fatty atrophy of the pancreas.  Spleen: Unremarkable.  Adrenals: Unremarkable.  Genitourinary: No hydronephrosis or nephrolithiasis. No bladder  wall thickening. Status post hysterectomy. No adnexal masses.  Gastrointestinal: No gastric wall thickening. Mild  circumferential wall thickening of the transverse colon, splenic  flexure, descending colon with scattered air-fluid levels without  significant surrounding fat stranding/edema probably represents  mild infectious colitis or inflammatory bowel disease. No free  air or free fluid. Status post appendectomy.  Peritoneum: Unremarkable.  Vasculature: Mild atherosclerosis of the aorta and iliac arteries  including the mesenteric branches.  Lymph nodes: No pathologically enlarged lymph nodes.  Bones: Mild degenerative changes of the bilateral hips and lower  lumbar spine. Neurostimulator electrode terminates in the left S3  foramen.  Soft tissues: Focal areas of skin thickening with subcutaneous  edema/fat stranding in the abdominal wall, left greater than  right, could be related to subcutaneous injections.  Incidental findings: Scattered coronary artery calcifications.  Elevation of the right hemidiaphragm of unknown etiology.       Impression:      Mild circumferential wall thickening of the transverse colon,  splenic flexure, descending colon with scattered air-fluid levels  without significant surrounding fat stranding/edema probably  represents mild infectious colitis or inflammatory bowel disease.    Electronically signed by:  Jenny Garcia  7/2/2021 10:54 PM CDT  Workstation: [id number]    XR Chest 1 View [[id number]] Collected: 07/02/21 2120     Updated: 07/02/21 2143    Narrative:      CLINICAL  "INDICATION: Central line placement    TECHNIQUE: Single view of the chest    COMPARISON: 7/2/2021 at 8:49 PM    FINDINGS: Previously seen right internal jugular catheter has  been removed in the interval. A new left internal jugular  catheter terminates in the superior vena cava. Elevation of the  right hemidiaphragm. Lungs are clear. No pleural effusion or  pneumothorax. Cardiomediastinal silhouette is prominent.  Degenerative changes in the spine and bilateral shoulders.  Postsurgical changes of median sternotomy.      Impression:      1.  A new left internal jugular catheter terminates in the  superior vena cava. No pneumothorax.  2.  No acute cardiopulmonary disease.  3.  Prominent cardiac silhouette.    Electronically signed by:  Maya Grace MD  7/2/2021 9:42 PM  CDT Workstation: [id number]    XR Chest 1 View [[id number]] Collected: 07/02/21 2050     Updated: 07/02/21 2117    Narrative:      CLINICAL INDICATION: s/p central line placement    TECHNIQUE: Single view of the chest    COMPARISON: 5/17/2021.    FINDINGS: Right internal jugular catheter terminates in the right  subclavian vein. Mild elevation of the right hemidiaphragm. Lung  volumes are diminished. Lungs are clear. No pleural effusion or  pneumothorax. Cardiomediastinal silhouette is enlarged.  Postsurgical changes of median sternotomy. Cholecystectomy clips  in the right upper quadrant.      Impression:      1.  Right internal jugular catheter terminates in the right  subclavian vein. Recommend repositioning.  2.  No acute cardiopulmonary disease.  3.  Enlarged cardiomediastinal silhouette.    Electronically signed by:  Maya Grace MD  7/2/2021 9:16 PM  CDT Workstation: [id number]            Assessment:    Active Hospital Problems    Diagnosis    • Acute colitis      # Sepsis, severe sepsis, present on admission, possible in setting of colitis  # Colitis  -CT of the abdomen and pelvis showed \"mild circumferential wall thickening of the " "transverse colon,  splenic flexure, descending colon with scattered air-fluid levels  without significant surrounding fat stranding/edema probably  represents mild infectious colitis or inflammatory bowel disease.\"  # Abdominal pain nausea vomiting secondary to above   # Acute renal failure in setting of sepsis  # Leukocytosis, reactive, in setting of sepsis  # Hypotension in setting of sepsis  # Diabetes mellitus type 2  # Morbid obesity  # Uncontrolled diabetes mellitus type 2 with independent, with hyperglycemia  # Anemia with no sign of active bleeding  # History of coronary artery disease with remote history of CABG# Left BKA   # Obesity with BMI of 35        Plan:  Admit to intensive care unit  Obtain stool for C. Difficile  Obtain procalcitonin level  Blood cultures was obtained in the ED  Placed on Zosyn and Flagyl pending further evaluation  Placed on IV fluid, analgesics supportive therapy  Placed on clear liquid diet  Insulin sliding scale  Her comorbidities will be treated appropriately  Avoid nephrotoxic medication as much as possible  Reconcile home medication and continue with essential home medication including aspirin, Plavix, metoprolol  Further decision making is depending on response to alcohol care  Instructed patient about necessity of outpatient colonoscopy up in recovery no later than in 8 weeks, she denies any prior history of colonoscopy   Lifestyle modification, medically supervised weight reduction, medically supervised exercise program, low calorie diet, for treatment of her obesity, all outpatient recommended  Please refer to orders for comprehensive plan    I confirmed that the patient's Advance Care Plan is present, code status is documented, or surrogate decision maker is listed in the patient's medical record.     I have utilized all available immediate resources to obtain, update, or review the patient's current medications.     I discussed the patient's findings and my " recommendations with patient and she agreed with above plan of care      Saeid Behroozi, MD   07/02/21   23:35 CDT                Electronically signed by Behroozi, Saeid, MD at 07/03/21 8904          Emergency Department Notes      Froilan Martinez MD at 07/02/21 1902      Procedure Orders    1. Central Line At Bedside [[id number]] ordered by Addi Johnson MD    2. Central Line At Bedside [[id number]] ordered by Froilan Martinez MD    3. ECG 12 Lead [[id number]] ordered by Froilan Martniez MD               Subjective   61-year-old white female with a history of multiple medical problems presents to the emergency department with chief complaint of abdominal pain.  Patient complains of bilateral lower abdominal pain for 2 days.  She relates the pain radiates to her lower back.  She describes the pain as crampy.  She rates the pain as 10 out of 10 severity.  Associated symptoms include nausea and vomiting.          Review of Systems   Constitutional: Negative for chills, diaphoresis and fever.   Respiratory: Positive for cough. Negative for shortness of breath.    Cardiovascular: Positive for chest pain.   Gastrointestinal: Positive for abdominal pain, nausea and vomiting. Negative for blood in stool.   Genitourinary: Negative for dysuria and hematuria.   Musculoskeletal: Positive for back pain. Negative for neck pain.   Neurological: Positive for light-headedness. Negative for syncope, weakness and headaches.   All other systems reviewed and are negative.      Past Medical History:   Diagnosis Date   • Angina, class IV (CMS/HCC)    • Anxiety    • Anxiety and depression    • Arthritis    • Benign paroxysmal positional vertigo    • Bladder disorder     has bladder stimulator   • Carpal tunnel syndrome    • CHF (congestive heart failure) (CMS/HCC)    • Chronic pain    • Coronary atherosclerosis     hx CABG 2005.  is followed by Dr Kwon   • Depression    • Diabetes mellitus (CMS/HCC)     Type 2, controlled   • Diabetic  polyneuropathy (CMS/HCC)    • Disease of thyroid gland    • Elevated cholesterol    • Female stress incontinence    • Foot pain, left    • Full dentures    • Gastroparesis    • GERD (gastroesophageal reflux disease)    • Hyperlipidemia    • Hypertension    • Low back pain    • Malaise and fatigue    • Multiple joint pain    • Obesity     Refuses to be weighed   • Occlusion and stenosis of bilateral carotid arteries 6/18/2021   • Otalgia     Both   • Perforation of tympanic membrane     Left   • Postoperative wound infection    • Vitamin D deficiency    • Wears glasses     reading       Allergies   Allergen Reactions   • Seroquel [Quetiapine] Anaphylaxis   • Avandia [Rosiglitazone] Swelling   • Morphine And Related Hallucinations   • Oxycodone Hallucinations       Past Surgical History:   Procedure Laterality Date   • ABDOMINAL SURGERY     • AMPUTATION FOOT     • ANGIOPLASTY      coronary   • ANKLE ARTHROSCOPY Left 2/26/2021    Procedure: Left foot hardware removal, ankle arthroscopy, bone grafting , left foot exostectomy;  Surgeon: Ignacio Lord DPM;  Location: [facility] OR;  Service: Podiatry;  Laterality: Left;   • BREAST BIOPSY Right    • CARDIAC CATHETERIZATION     • CARDIAC CATHETERIZATION N/A 6/20/2017    Procedure: Right Heart Cath;  Surgeon: Can Kwon MD PhD;  Location: [facility] CATH INVASIVE LOCATION;  Service:    • CARDIAC CATHETERIZATION N/A 2/18/2020    Procedure: Left Heart Cath;  Surgeon: Catalina Cooper MD;  Location: [facility] CATH INVASIVE LOCATION;  Service: Cardiology;  Laterality: N/A;   • CARPAL TUNNEL RELEASE     • CHOLECYSTECTOMY     • COLONOSCOPY N/A 6/24/2020    Procedure: COLONOSCOPY;  Surgeon: Julián Maldonado MD;  Location: [facility] ENDOSCOPY;  Service: Gastroenterology;  Laterality: N/A;   • CORONARY ARTERY BYPASS GRAFT  2005   • ENDOSCOPY N/A 10/19/2018    Procedure: ESOPHAGOGASTRODUODENOSCOPY possible dilation;  Surgeon: Julián Maldonado MD;  Location: [facility] ENDOSCOPY;   Service: Gastroenterology   • ENDOSCOPY N/A 6/24/2020    Procedure: ESOPHAGOGASTRODUODENOSCOPY WED appt please;  Surgeon: Julián Maldonado MD;  Location: [facility] ENDOSCOPY;  Service: Gastroenterology;  Laterality: N/A;   • ENDOSCOPY AND COLONOSCOPY     • FOOT SURGERY      Toes   • FOOT SURGERY     • GASTRIC BANDING      Revision, laparoscopic   • HYSTERECTOMY     • INCISION AND DRAINAGE LEG Left 3/12/2021    Procedure: Left ankle arthroscopic irrigation and debridement, screw removal;  Surgeon: Ignacio Lord DPM;  Location: [facility] OR;  Service: Podiatry;  Laterality: Left;   • MOUTH SURGERY     • SALPINGO OOPHORECTOMY     • SHOULDER SURGERY     • SUBTALAR ARTHRODESIS Left 1/16/2019    Procedure: LEFT FOOT HARDWARE REMOVAL, FIFTH METATARSAL , OPEN REDUCTION INTERNAL FIXATION, CALCANEAL OSTEOTOMY;  Surgeon: Ignacio Lord DPM;  Location: [facility] OR;  Service: Podiatry   • SUBTALAR ARTHRODESIS Left 10/16/2019    Procedure: foot hardware removal, subtalar joint fusion  possible de/reattachment of achilles tendon        (c-arm);  Surgeon: Ignacio Lord DPM;  Location: [facility] OR;  Service: Podiatry   • SUBTALAR ARTHRODESIS Left 9/30/2020    Procedure: subtalar, talonavicular joint arthrodesis.  Removal hardware.          (c-arm);  Surgeon: Ignacio Lord DPM;  Location: [facility];  Service: Podiatry;  Laterality: Left;   • TRANSESOPHAGEAL ECHOCARDIOGRAM (LAMONTE)      With color flow       Family History   Problem Relation Age of Onset   • Diabetes Other    • Heart disease Other    • Hypertension Other    • Heart disease Mother    • Stroke Mother    • Hypertension Mother    • Diabetes Sister    • Heart disease Sister    • Hypertension Sister    • Heart disease Sister    • Diabetes Sister    • Hypertension Sister    • Diabetes Sister    • Diabetes Sister    • Diabetes Sister    • Diabetes Sister        Social History     Socioeconomic History   • Marital status:      Spouse name: Not on file   •  Number of children: Not on file   • Years of education: Not on file   • Highest education level: Not on file   Tobacco Use   • Smoking status: Never Smoker   • Smokeless tobacco: Never Used   Vaping Use   • Vaping Use: Never used   Substance and Sexual Activity   • Alcohol use: No   • Drug use: No   • Sexual activity: Defer           Objective   Physical Exam  Vitals and nursing note reviewed.   Constitutional:       General: She is not in acute distress.     Appearance: She is not toxic-appearing or diaphoretic.   HENT:      Head: Normocephalic and atraumatic.      Right Ear: External ear normal.      Left Ear: External ear normal.      Nose: Nose normal.      Mouth/Throat:      Mouth: Mucous membranes are moist.      Pharynx: Oropharynx is clear.   Eyes:      Extraocular Movements: Extraocular movements intact.      Conjunctiva/sclera: Conjunctivae normal.      Pupils: Pupils are equal, round, and reactive to light.   Cardiovascular:      Rate and Rhythm: Normal rate and regular rhythm.      Pulses: Normal pulses.      Heart sounds: Normal heart sounds.   Pulmonary:      Effort: Pulmonary effort is normal.      Breath sounds: Normal breath sounds.   Abdominal:      General: Bowel sounds are normal. There is no distension.      Palpations: Abdomen is soft.      Tenderness: There is generalized abdominal tenderness. There is no guarding or rebound.   Musculoskeletal:      Cervical back: Normal range of motion and neck supple.      Comments: Status post left BKA.   Skin:     General: Skin is warm and dry.   Neurological:      General: No focal deficit present.      Mental Status: She is alert and oriented to person, place, and time.   Psychiatric:         Mood and Affect: Mood normal.         Behavior: Behavior normal.         ECG 12 Lead      Date/Time: 7/2/2021 7:27 PM  Performed by: Froilan Martinez MD  Authorized by: Froilan Martinez MD   Interpreted by physician  Clinical impression: non-specific ECG  Comments: Normal  sinus rhythm rate of 89.  Baseline artifact.  No ST elevation.  Nonspecific findings.    Central Line At Bedside    Date/Time: 7/2/2021 8:05 PM  Performed by: Froilan Martinez MD  Authorized by: Froilan Martinez MD     Consent:     Consent obtained:  Verbal    Consent given by:  Patient    Risks discussed:  Arterial puncture, incorrect placement, nerve damage, bleeding, infection and pneumothorax  Pre-procedure details:     Hand hygiene: Hand hygiene performed prior to insertion      Sterile barrier technique: All elements of maximal sterile technique followed      Skin preparation:  ChloraPrep    Skin preparation agent: Skin preparation agent completely dried prior to procedure    Anesthesia (see MAR for exact dosages):     Anesthesia method:  Local infiltration    Local anesthetic:  Lidocaine 1% w/o epi  Procedure details:     Location:  R subclavian    Patient position:  Trendelenburg    Landmarks identified: yes      Number of attempts:  1    Successful placement: no    Post-procedure details:     Patient tolerance of procedure:  Tolerated well, no immediate complications  Comments:      Attempted right subclavian central venous catheter.  Unable to cannulate the subclavian vein under the clavicle.  Therefore I requested Dr. Johnson to perform ultrasound guided right internal jugular central venous catheter.  Central Line At Bedside    Date/Time: 7/2/2021 9:29 PM  Performed by: Addi Johnson MD  Authorized by: Froilan Martinez MD     Consent:     Consent obtained:  Verbal    Consent given by:  Patient    Risks discussed:  Arterial puncture, bleeding, infection, incorrect placement and pneumothorax  Pre-procedure details:     Hand hygiene: Hand hygiene performed prior to insertion      Sterile barrier technique: All elements of maximal sterile technique followed      Skin preparation:  ChloraPrep  Anesthesia (see MAR for exact dosages):     Anesthesia method:  Local infiltration    Local anesthetic:  Lidocaine 1% w/o  epi  Procedure details:     Location:  L internal jugular    Patient position:  Flat    Procedural supplies:  Triple lumen    Catheter size:  7.5 Fr    Landmarks identified: yes      Ultrasound guidance: yes      Sterile ultrasound techniques: Sterile gel and sterile probe covers were used      Number of attempts:  1    Successful placement: yes    Post-procedure details:     Post-procedure:  Dressing applied and line sutured    Assessment:  Blood return through all ports, free fluid flow, placement verified by x-ray and no pneumothorax on x-ray    Patient tolerance of procedure:  Tolerated well, no immediate complications              ED Course  ED Course as of Jul 02 2305 Fri Jul 02, 2021 2302 Patient is alert and in no acute distress.  I reviewed the results of her evaluation with her.  I recommended admission to the hospital for treatment with IV fluids and antibiotics.  Case discussed with the hospitalist Dr. Behroozi.  He agrees to admit the patient to the stepdown unit.    [DR]      ED Course User Index  [DR] Froilan Martinez MD           Labs Reviewed   COMPREHENSIVE METABOLIC PANEL - Abnormal; Notable for the following components:       Result Value    Glucose 241 (*)     BUN 32 (*)     Creatinine 2.00 (*)     Sodium 133 (*)     Chloride 95 (*)     Alkaline Phosphatase 129 (*)     eGFR Non  Amer 26 (*)     All other components within normal limits    Narrative:     GFR Normal >60  Chronic Kidney Disease <60  Kidney Failure <15     CBC WITH AUTO DIFFERENTIAL - Abnormal; Notable for the following components:    WBC 24.21 (*)     Hemoglobin 11.5 (*)     Platelets 463 (*)     Neutrophil % 84.4 (*)     Lymphocyte % 7.6 (*)     Immature Grans % 1.1 (*)     Neutrophils, Absolute 20.45 (*)     Monocytes, Absolute 1.37 (*)     Immature Grans, Absolute 0.27 (*)     All other components within normal limits   POCT GLUCOSE FINGERSTICK - Abnormal; Notable for the following components:    Glucose 169 (*)     All  other components within normal limits   LIPASE - Normal   LACTIC ACID, PLASMA - Normal   BLOOD CULTURE   BLOOD CULTURE   URINALYSIS W/ MICROSCOPIC IF INDICATED (NO CULTURE)   CBC AND DIFFERENTIAL    Narrative:     The following orders were created for panel order CBC & Differential.  Procedure                               Abnormality         Status                     ---------                               -----------         ------                     CBC Auto Differential[[id number]]        Abnormal            Final result                 Please view results for these tests on the individual orders.   EXTRA TUBES    Narrative:     The following orders were created for panel order Extra Tubes.  Procedure                               Abnormality         Status                     ---------                               -----------         ------                     Gold Top - SST[[id number]]                                   In process                   Please view results for these tests on the individual orders.   GOLD TOP - SST     CT Abdomen Pelvis With Contrast    Result Date: 7/2/2021  Narrative: CT ABDOMEN PELVIS WITH IV CONTRAST INDICATION: 61 years Female; abdominal pain TECHNIQUE:  CT scan of the abdomen and pelvis was performed with IV contrast.  This exam was performed according to our departmental dose-optimization program, which includes automated exposure control, adjustment of the mA and/or kV according to patient size and/or use of iterative reconstruction technique. Comparison: 11/27/2017. FINDINGS: Liver: Unremarkable. Gallbladder/Biliary tree: Status post cholecystectomy. No significant biliary dilatation. Pneumobilia is probably within normal limits for previous sphincterotomy. Pancreas: Fatty atrophy of the pancreas. Spleen: Unremarkable. Adrenals: Unremarkable. Genitourinary: No hydronephrosis or nephrolithiasis. No bladder wall thickening. Status post hysterectomy. No adnexal masses.  Gastrointestinal: No gastric wall thickening. Mild circumferential wall thickening of the transverse colon, splenic flexure, descending colon with scattered air-fluid levels without significant surrounding fat stranding/edema probably represents mild infectious colitis or inflammatory bowel disease. No free air or free fluid. Status post appendectomy. Peritoneum: Unremarkable. Vasculature: Mild atherosclerosis of the aorta and iliac arteries including the mesenteric branches. Lymph nodes: No pathologically enlarged lymph nodes. Bones: Mild degenerative changes of the bilateral hips and lower lumbar spine. Neurostimulator electrode terminates in the left S3 foramen. Soft tissues: Focal areas of skin thickening with subcutaneous edema/fat stranding in the abdominal wall, left greater than right, could be related to subcutaneous injections. Incidental findings: Scattered coronary artery calcifications. Elevation of the right hemidiaphragm of unknown etiology.     Impression: Mild circumferential wall thickening of the transverse colon, splenic flexure, descending colon with scattered air-fluid levels without significant surrounding fat stranding/edema probably represents mild infectious colitis or inflammatory bowel disease. Electronically signed by:  Jenny Garcia  7/2/2021 10:54 PM CDT Workstation: [id number]    XR Chest 1 View    Result Date: 7/2/2021  Narrative: CLINICAL INDICATION: Central line placement TECHNIQUE: Single view of the chest COMPARISON: 7/2/2021 at 8:49 PM FINDINGS: Previously seen right internal jugular catheter has been removed in the interval. A new left internal jugular catheter terminates in the superior vena cava. Elevation of the right hemidiaphragm. Lungs are clear. No pleural effusion or pneumothorax. Cardiomediastinal silhouette is prominent. Degenerative changes in the spine and bilateral shoulders. Postsurgical changes of median sternotomy.     Impression: 1.  A new left internal jugular  catheter terminates in the superior vena cava. No pneumothorax. 2.  No acute cardiopulmonary disease. 3.  Prominent cardiac silhouette. Electronically signed by:  Maya Grace MD  7/2/2021 9:42 PM CDT Workstation: [id number]    XR Chest 1 View    Result Date: 7/2/2021  Narrative: CLINICAL INDICATION: s/p central line placement TECHNIQUE: Single view of the chest COMPARISON: 5/17/2021. FINDINGS: Right internal jugular catheter terminates in the right subclavian vein. Mild elevation of the right hemidiaphragm. Lung volumes are diminished. Lungs are clear. No pleural effusion or pneumothorax. Cardiomediastinal silhouette is enlarged. Postsurgical changes of median sternotomy. Cholecystectomy clips in the right upper quadrant.     Impression: 1.  Right internal jugular catheter terminates in the right subclavian vein. Recommend repositioning. 2.  No acute cardiopulmonary disease. 3.  Enlarged cardiomediastinal silhouette. Electronically signed by:  Maya Grace MD  7/2/2021 9:16 PM CDT Workstation: [id number]                                  [facility]    Final diagnoses:   Acute colitis   Acute kidney injury (CMS/HCC)       ED Disposition  ED Disposition     ED Disposition Condition Comment    Decision to Admit  Level of Care: Stepdown [25]   Diagnosis: Acute colitis [096957]   Admitting Physician: BEHROOZI, SAEID [[id number]]   Attending Physician: BEHROOZI, SAEID [[id number]]   Certification: I Certify That Inpatient Hospital Services Are Medically Necessary For Greater Than 2 Midnights            No follow-up provider specified.       Medication List      No changes were made to your prescriptions during this visit.          Froilan Martinez MD  07/02/21 2304       Froilan Martinez MD  07/02/21 2305      Electronically signed by Froilan Martinez MD at 07/02/21 2305     Caitlyn Caban, RN at 07/02/21 1937        Lab notified to collect blood.      Caitlyn Caban, RN  07/02/21 1937      Electronically signed by Caitlyn Caban  CAIT RN at 07/02/21 1937     Franci John, RN at 07/02/21 2005        Patient moved to room 6 for closer observation. Dr Martinez at bedside to place central line due to inability to obtain PIV.      Franci John, RN  07/02/21 2006      Electronically signed by Franci John RN at 07/02/21 2006     Franci John, RN at 07/02/21 2121        Left IJ in place. Confirmed with xray. Okay for use per verbal order from Dr Johnson. Patient tolerated well.      Franci John RN  07/02/21 2123      Electronically signed by Franci John RN at 07/02/21 2123       Vital Signs (last 2 days)     Date/Time   Temp   Temp src   Pulse   Resp   BP   Patient Position   SpO2    07/06/21 0725   97.4 (36.3)   Temporal   68   18   (!) 190/70   Lying   96    BP: RN notified at 07/06/21 0725    07/06/21 0339   96.8 (36)   Temporal   77   18   153/67   Lying   96    07/06/21 0032   --   --   75   --   --   --   --    07/05/21 2331   97.4 (36.3)   Temporal   72   18   138/64   Lying   97    07/05/21 2011   96.9 (36.1)   Temporal   73   18   139/63   Lying   96    07/05/21 1533   97.3 (36.3)   Temporal   77   18   164/70   Lying   99    07/05/21 1510   --   --   75   --   --   --   --    07/05/21 1347   97.6 (36.4)   Temporal   83   18   137/62   Lying   99    07/05/21 1319   --   --   84   --   --   --   --    07/05/21 1200   97.8 (36.6)   Temporal   75   16   147/65   Lying   96    07/05/21 0800   97.7 (36.5)   Temporal   71   18   162/69   Lying   98    07/05/21 0600   --   --   72   --   128/58   --   94    07/05/21 0500   --   --   72   --   136/59   --   94    07/05/21 0400   98.1 (36.7)   Oral   83   --   157/69   --   99    07/05/21 0300   --   --   77   --   125/58   --   97    07/05/21 0200   --   --   79   --   113/53   --   98    07/05/21 0100   --   --   80   --   113/56   --   96    07/05/21 0000   97.4 (36.3)   Temporal   86   --   105/54   --   96    07/04/21 2300   --   --   89   --   124/57   --   96    07/04/21 2200   --   --   93    --   120/56   --   98    07/04/21 2100   --   --   97   --   119/51   --   97    07/04/21 2030   97.2 (36.2)   Temporal   107   --   --   --   99    07/04/21 2000   --   --   109   --   144/71   --   99    07/04/21 1900   --   --   109   --   114/56   --   (!) 71    07/04/21 1800   --   --   99   --   120/57   --   98    07/04/21 1700   --   --   100   --   134/66   --   97    07/04/21 1600   97.8 (36.6)   Temporal   90   16   112/59   Lying   100    07/04/21 1500   --   --   78   --   119/58   --   97    07/04/21 1300   --   --   78   --   108/57   --   97    07/04/21 1200   97.5 (36.4)   Temporal   91   16   120/56   Lying   97    07/04/21 1100   --   --   87   --   (!) 88/52   --   98    07/04/21 1000   --   --   81   --   127/58   --   98    07/04/21 0900   --   --   85   --   111/54   --   97    07/04/21 0800   97.7 (36.5)   Temporal   80   16   147/67   Lying   97    07/04/21 0700   --   --   75   --   122/56   --   96    07/04/21 0645   --   --   74   --   127/58   --   98    07/04/21 0630   --   --   73   --   105/53   --   97    07/04/21 0615   --   --   75   --   109/52   --   97    07/04/21 0600   --   --   79   --   126/59   --   97    07/04/21 0545   --   --   82   --   135/60   --   98    07/04/21 0530   --   --   78   --   134/62   --   98    07/04/21 0515   --   --   79   --   141/66   --   97    07/04/21 0500   --   --   80   --   147/63   --   96    07/04/21 0445   --   --   81   --   152/68   --   98    07/04/21 0430   --   --   81   --   172/74   --   99    07/04/21 0415   --   --   78   --   129/61   --   (!) 84    07/04/21 0400   --   --   65   --   137/63   --   95    07/04/21 0345   --   --   67   --   129/57   --   93    07/04/21 0330   --   --   70   --   149/65   --   96    07/04/21 0327   --   --   74   --   115/56   --   97    07/04/21 0315   --   --   73   --   (!) 84/47   --   95    07/04/21 0300   --   --   77   --   132/62   --   96    07/04/21 0200   --   --   75   --   114/53   --    93    07/04/21 0145   --   --   74   --   150/65   --   96    07/04/21 0130   --   --   82   --   150/65   --   95    07/04/21 0115   --   --   76   --   124/61   --   92    07/04/21 0100   --   --   66   --   109/55   --   94    07/04/21 0045   --   --   76   --   136/62   --   96    07/04/21 0030   --   --   72   --   (!) 87/51   --   93    07/04/21 0015   --   --   70   --   (!) 87/45   --   94    07/04/21 0000   97.8 (36.6)   Oral   71   --   110/55   --   97              Current Facility-Administered Medications   Medication Dose Route Frequency Provider Last Rate Last Admin   • acetaminophen (TYLENOL) tablet 650 mg  650 mg Oral Q6H PRN Brandon Martel MD   650 mg at 07/06/21 0918   • ARIPiprazole (ABILIFY) tablet 15 mg  15 mg Oral Daily Brandon Martel MD   15 mg at 07/06/21 0720   • aspirin EC tablet 81 mg  81 mg Oral Daily Behroozi, Saeid, MD   81 mg at 07/06/21 0720   • atorvastatin (LIPITOR) tablet 80 mg  80 mg Oral Daily Behroozi, Saeid, MD   80 mg at 07/06/21 0720   • clopidogrel (PLAVIX) tablet 75 mg  75 mg Oral Daily Behroozi, Saeid, MD   75 mg at 07/06/21 0720   • enoxaparin (LOVENOX) syringe 40 mg  40 mg Subcutaneous Q24H Behroozi, Saeid, MD   40 mg at 07/06/21 0721   • famotidine (PEPCID) injection 20 mg  20 mg Intravenous Daily Behroozi, Saeid, MD   20 mg at 07/06/21 0720   • folic acid (FOLVITE) tablet 1 mg  1 mg Oral Daily Behroozi, Saeid, MD   1 mg at 07/06/21 0720   • gabapentin (NEURONTIN) capsule 300 mg  300 mg Oral Q8H Brandon Martel MD   300 mg at 07/06/21 0610   • glucagon (human recombinant) (GLUCAGEN DIAGNOSTIC) injection 1 mg  1 mg Subcutaneous Q15 Min PRN Behroozi, Saeid, MD       • HYDROmorphone (DILAUDID) injection 0.5 mg  0.5 mg Intravenous Q4H PRN Behroozi, Saeid, MD   0.5 mg at 07/05/21 1603   • HYDROmorphone (DILAUDID) injection 1 mg  1 mg Intravenous Once Froilan Martinez MD   Stopped at 07/02/21 2219   • insulin aspart (novoLOG) injection 0-14 Units  0-14 Units  Subcutaneous TID AC Mahin Esteves MD   5 Units at 07/06/21 1153   • insulin detemir (LEVEMIR) injection 40 Units  40 Units Subcutaneous Q12H Brandon Martel MD   40 Units at 07/06/21 0721   • lactated ringers infusion  150 mL/hr Intravenous Continuous Brandon Martel  mL/hr at 07/06/21 0721 150 mL/hr at 07/06/21 0721   • LORazepam (ATIVAN) tablet 0.5 mg  0.5 mg Oral Q8H PRN Brandon Martel MD   0.5 mg at 07/05/21 2114   • norepinephrine (LEVOPHED) 8 mg in 250 mL NS infusion (premix)  0.02-0.3 mcg/kg/min Intravenous Titrated Brandon Martel MD   Stopped at 07/04/21 0433   • ondansetron (ZOFRAN) injection 4 mg  4 mg Intravenous Q6H PRN Behroozi, Saeid, MD   4 mg at 07/05/21 1455   • piperacillin-tazobactam (ZOSYN) 3.375 g/100 mL 0.9% NS IVPB (mbp)  3.375 g Intravenous Q8H Behroozi, Saeid, MD   3.375 g at 07/06/21 0610   • prochlorperazine (COMPAZINE) injection 5 mg  5 mg Intravenous Q6H PRN Behroozi, Saeid, MD   5 mg at 07/05/21 0819   • sodium chloride 0.9 % flush 10 mL  10 mL Intravenous PRN Froilan Martinez MD       • sodium chloride 0.9 % flush 10 mL  10 mL Intravenous Q12H Behroozi, Saeid, MD   10 mL at 07/04/21 0839   • sodium chloride 0.9 % flush 10 mL  10 mL Intravenous PRN Behroozi, Saeid, MD       • sodium chloride 0.9 % flush 10 mL  10 mL Intravenous Q12H Behroozi, Saeid, MD   10 mL at 07/05/21 2115   • sodium chloride 0.9 % flush 10 mL  10 mL Intravenous Q12H Behroozi, Saeid, MD   10 mL at 07/05/21 0820   • sodium chloride 0.9 % flush 10 mL  10 mL Intravenous Q12H Behroozi, Saeid, MD   10 mL at 07/06/21 0721   • sodium chloride 0.9 % flush 10 mL  10 mL Intravenous PRN Behroozi, Saeid, MD       • sodium chloride 0.9 % flush 20 mL  20 mL Intravenous PRN Behroozi, Saeid, MD       • tamsulosin (FLOMAX) 24 hr capsule 0.4 mg  0.4 mg Oral Daily Behroozi, Saeid, MD   0.4 mg at 07/06/21 0720   • venlafaxine XR (EFFEXOR-XR) 24 hr capsule 150 mg  150 mg Oral Daily With Breakfast Ebenibo,  Brandon JUAREZ MD   150 mg at 07/06/21 0720     Lab Results (most recent)     Procedure Component Value Units Date/Time    POC Glucose Once [[id number]]  (Abnormal) Collected: 07/06/21 1043    Specimen: Blood Updated: 07/06/21 1101     Glucose 226 mg/dL      Comment: RN NotifiedOperator: [id number] BRETT Santana ID: [id number]       Basic Metabolic Panel [[id number]]  (Abnormal) Collected: 07/06/21 0626    Specimen: Blood Updated: 07/06/21 0718     Glucose 173 mg/dL      BUN 4 mg/dL      Creatinine 0.80 mg/dL      Sodium 138 mmol/L      Potassium 3.4 mmol/L      Chloride 103 mmol/L      CO2 26.0 mmol/L      Calcium 9.2 mg/dL      eGFR Non African Amer 73 mL/min/1.73      BUN/Creatinine Ratio 5.0     Anion Gap 9.0 mmol/L     Narrative:      GFR Normal >60  Chronic Kidney Disease <60  Kidney Failure <15      CBC & Differential [[id number]]  (Abnormal) Collected: 07/06/21 0645    Specimen: Blood Updated: 07/06/21 0707    Narrative:      The following orders were created for panel order CBC & Differential.  Procedure                               Abnormality         Status                     ---------                               -----------         ------                     CBC Auto Differential[[id number]]        Abnormal            Final result                 Please view results for these tests on the individual orders.    CBC Auto Differential [[id number]]  (Abnormal) Collected: 07/06/21 0645    Specimen: Blood Updated: 07/06/21 0707     WBC 9.19 10*3/mm3      RBC 3.54 10*6/mm3      Hemoglobin 10.1 g/dL      Hematocrit 31.2 %      MCV 88.1 fL      MCH 28.5 pg      MCHC 32.4 g/dL      RDW 14.2 %      RDW-SD 45.4 fl      MPV 9.8 fL      Platelets 328 10*3/mm3      Neutrophil % 60.0 %      Lymphocyte % 27.7 %      Monocyte % 7.0 %      Eosinophil % 4.7 %      Basophil % 0.3 %      Immature Grans % 0.3 %      Neutrophils, Absolute 5.51 10*3/mm3      Lymphocytes, Absolute 2.55 10*3/mm3      Monocytes, Absolute 0.64  10*3/mm3      Eosinophils, Absolute 0.43 10*3/mm3      Basophils, Absolute 0.03 10*3/mm3      Immature Grans, Absolute 0.03 10*3/mm3      nRBC 0.0 /100 WBC     POC Glucose Once [[id number]]  (Abnormal) Collected: 07/06/21 0553    Specimen: Blood Updated: 07/06/21 0638     Glucose 175 mg/dL      Comment: RN NotifiedOperator: [id number] PEGGY Garcia ID: [id number]       Blood Culture - Blood, Blood, Central Line [[id number]] Collected: 07/02/21 2242    Specimen: Blood, Central Line Updated: 07/05/21 2300     Blood Culture No growth at 3 days    Blood Culture - Blood, Blood, Central Line [[id number]] Collected: 07/02/21 2201    Specimen: Blood, Central Line Updated: 07/05/21 2215     Blood Culture No growth at 3 days    Manual Differential [[id number]]  (Abnormal) Collected: 07/05/21 0412    Specimen: Blood Updated: 07/05/21 0623     Neutrophil % 66.0 %      Lymphocyte % 25.0 %      Monocyte % 4.0 %      Eosinophil % 4.0 %      Atypical Lymphocyte % 1.0 %      Neutrophils Absolute 7.77 10*3/mm3      Lymphocytes Absolute 3.06 10*3/mm3      Monocytes Absolute 0.47 10*3/mm3      Eosinophils Absolute 0.47 10*3/mm3      Anisocytosis Slight/1+     Hypochromia Slight/1+     WBC Morphology Normal     Platelet Morphology Normal    CBC & Differential [[id number]]  (Abnormal) Collected: 07/05/21 0412    Specimen: Blood Updated: 07/05/21 0526    Narrative:      The following orders were created for panel order CBC & Differential.  Procedure                               Abnormality         Status                     ---------                               -----------         ------                     CBC Auto Differential[[id number]]        Abnormal            Final result                 Please view results for these tests on the individual orders.    CBC Auto Differential [[id number]]  (Abnormal) Collected: 07/05/21 0412    Specimen: Blood Updated: 07/05/21 0526     WBC 11.78 10*3/mm3      RBC 3.83 10*6/mm3      Hemoglobin  11.0 g/dL      Hematocrit 34.2 %      MCV 89.3 fL      MCH 28.7 pg      MCHC 32.2 g/dL      RDW 14.4 %      RDW-SD 46.7 fl      MPV 9.9 fL      Platelets 323 10*3/mm3     Basic Metabolic Panel [[id number]]  (Abnormal) Collected: 07/05/21 0412    Specimen: Blood Updated: 07/05/21 0507     Glucose 189 mg/dL      BUN 10 mg/dL      Creatinine 1.02 mg/dL      Sodium 139 mmol/L      Potassium 4.2 mmol/L      Comment: Slight hemolysis detected by analyzer. Results may be affected.        Chloride 104 mmol/L      CO2 29.0 mmol/L      Calcium 9.4 mg/dL      eGFR Non African Amer 55 mL/min/1.73      BUN/Creatinine Ratio 9.8     Anion Gap 6.0 mmol/L     Narrative:      GFR Normal >60  Chronic Kidney Disease <60  Kidney Failure <15      Comprehensive Metabolic Panel [[id number]]  (Abnormal) Collected: 07/04/21 0845    Specimen: Blood Updated: 07/04/21 0920     Glucose 161 mg/dL      BUN 22 mg/dL      Creatinine 1.32 mg/dL      Sodium 139 mmol/L      Potassium 3.9 mmol/L      Comment: Slight hemolysis detected by analyzer. Results may be affected.        Chloride 104 mmol/L      CO2 27.0 mmol/L      Calcium 9.7 mg/dL      Total Protein 6.3 g/dL      Albumin 3.70 g/dL      ALT (SGPT) 12 U/L      AST (SGOT) 14 U/L      Comment: Slight hemolysis detected by analyzer. Results may be affected.        Alkaline Phosphatase 103 U/L      Total Bilirubin 0.3 mg/dL      eGFR Non African Amer 41 mL/min/1.73      Globulin 2.6 gm/dL      A/G Ratio 1.4 g/dL      BUN/Creatinine Ratio 16.7     Anion Gap 8.0 mmol/L     Narrative:      GFR Normal >60  Chronic Kidney Disease <60  Kidney Failure <15      Magnesium [[id number]]  (Normal) Collected: 07/04/21 0845    Specimen: Blood Updated: 07/04/21 0915     Magnesium 1.7 mg/dL     CBC (No Diff) [[id number]]  (Abnormal) Collected: 07/04/21 0845    Specimen: Blood Updated: 07/04/21 0905     WBC 13.20 10*3/mm3      RBC 3.67 10*6/mm3      Hemoglobin 10.6 g/dL      Hematocrit 32.4 %      MCV 88.3 fL      MCH  28.9 pg      MCHC 32.7 g/dL      RDW 14.5 %      RDW-SD 46.5 fl      MPV 9.9 fL      Platelets 330 10*3/mm3     Calcium, Ionized [[id number]]  (Normal) Collected: 07/04/21 0845    Specimen: Blood Updated: 07/04/21 0901     Ionized Calcium 4.63 mg/dL     Urinalysis With Microscopic If Indicated (No Culture) - Urine, Catheter [[id number]]  (Normal) Collected: 07/03/21 2224    Specimen: Urine, Catheter Updated: 07/03/21 2230     Color, UA Yellow     Appearance, UA Clear     pH, UA 5.5     Specific Gravity, UA 1.010     Glucose, UA Negative     Ketones, UA Negative     Bilirubin, UA Negative     Blood, UA Negative     Protein, UA Negative     Leuk Esterase, UA Negative     Nitrite, UA Negative     Urobilinogen, UA 0.2 E.U./dL    Narrative:      Urine microscopic not indicated.    Glucose, Random [[id number]]  (Abnormal) Collected: 07/03/21 0904    Specimen: Blood Updated: 07/03/21 0956     Glucose 443 mg/dL     CBC (No Diff) [[id number]]  (Abnormal) Collected: 07/03/21 0619    Specimen: Blood Updated: 07/03/21 0639     WBC 28.39 10*3/mm3      RBC 4.33 10*6/mm3      Hemoglobin 12.5 g/dL      Hematocrit 37.8 %      MCV 87.3 fL      MCH 28.9 pg      MCHC 33.1 g/dL      RDW 14.1 %      RDW-SD 44.4 fl      MPV 10.3 fL      Platelets 447 10*3/mm3     Clostridium Difficile Toxin - Stool, Per Rectum [[id number]]  (Normal) Collected: 07/03/21 0110    Specimen: Stool from Per Rectum Updated: 07/03/21 0621    Narrative:      The following orders were created for panel order Clostridium Difficile Toxin - Stool, Per Rectum.  Procedure                               Abnormality         Status                     ---------                               -----------         ------                     Clostridium Difficile To...[[id number]]  Normal              Final result                 Please view results for these tests on the individual orders.    Clostridium Difficile Toxin, PCR - Stool, Per Rectum [[id number]]  (Normal) Collected:  07/03/21 0110    Specimen: Stool from Per Rectum Updated: 07/03/21 0621     C. Difficile Toxins by PCR Negative    Narrative:      Performed by real-time polymerase chain reaction (qPCR).    BNP [[id number]]  (Normal) Collected: 07/03/21 0336    Specimen: Blood Updated: 07/03/21 0428     proBNP 248.8 pg/mL     Narrative:      Among patients with dyspnea, NT-proBNP is highly sensitive for the detection of acute congestive heart failure. In addition NT-proBNP of <300 pg/ml effectively rules out acute congestive heart failure with 99% negative predictive value.    Results may be falsely decreased if patient taking Biotin.      Troponin [[id number]]  (Normal) Collected: 07/03/21 0336    Specimen: Blood Updated: 07/03/21 0428     Troponin T <0.010 ng/mL     Narrative:      Troponin T Reference Range:  <= 0.03 ng/mL-   Negative for AMI  >0.03 ng/mL-     Abnormal for myocardial necrosis.  Clinicians would have to utilize clinical acumen, EKG, Troponin and serial changes to determine if it is an Acute Myocardial Infarction or myocardial injury due to an underlying chronic condition.       Results may be falsely decreased if patient taking Biotin.      TSH [[id number]]  (Normal) Collected: 07/03/21 0336    Specimen: Blood Updated: 07/03/21 0428     TSH 3.540 uIU/mL     T4, Free [[id number]]  (Normal) Collected: 07/03/21 0336    Specimen: Blood Updated: 07/03/21 0428     Free T4 1.22 ng/dL     Narrative:      Results may be falsely increased if patient taking Biotin.      Procalcitonin [[id number]]  (Normal) Collected: 07/03/21 0336    Specimen: Blood Updated: 07/03/21 0425     Procalcitonin 0.10 ng/mL     Narrative:      As a Marker for Sepsis (Non-Neonates):     1. <0.5 ng/mL represents a low risk of severe sepsis and/or septic shock.  2. >2 ng/mL represents a high risk of severe sepsis and/or septic shock.    As a Marker for Lower Respiratory Tract Infections that require antibiotic therapy:  PCT on Admission     Antibiotic  "Therapy             6-12 Hrs later  >0.5                          Strongly Recommended            >0.25 - <0.5             Recommended  0.1 - 0.25                  Discouraged                       Remeasure/reassess PCT  <0.1                         Strongly Discouraged         Remeasure/reassess PCT      As 28 day mortality risk marker: \"Change in Procalcitonin Result\" (>80% or <=80%) if Day 0 (or Day 1) and Day 4 values are available. Refer to [email or web address] [email or web address]-[email or web address]/    Change in PCT <=80 %   A decrease of PCT levels below or equal to 80% defines a positive change in PCT test result representing a higher risk for 28-day all-cause mortality of patients diagnosed with severe sepsis or septic shock.    Change in PCT >80 %   A decrease of PCT levels of more than 80% defines a negative change in PCT result representing a lower risk for 28-day all-cause mortality of patients diagnosed with severe sepsis or septic shock.              Results may be falsely decreased if patient taking Biotin.     Comprehensive Metabolic Panel [[id number]]  (Abnormal) Collected: 07/03/21 0336    Specimen: Blood Updated: 07/03/21 0422     Glucose 392 mg/dL      BUN 34 mg/dL      Creatinine 2.11 mg/dL      Sodium 131 mmol/L      Potassium 4.4 mmol/L      Chloride 93 mmol/L      CO2 21.0 mmol/L      Calcium 9.3 mg/dL      Total Protein 6.9 g/dL      Albumin 3.60 g/dL      ALT (SGPT) 18 U/L      AST (SGOT) 16 U/L      Alkaline Phosphatase 139 U/L      Total Bilirubin 0.4 mg/dL      eGFR Non African Amer 24 mL/min/1.73      Globulin 3.3 gm/dL      A/G Ratio 1.1 g/dL      BUN/Creatinine Ratio 16.1     Anion Gap 17.0 mmol/L     Narrative:      GFR Normal >60  Chronic Kidney Disease <60  Kidney Failure <15      Lipase [[id number]]  (Abnormal) Collected: 07/03/21 0336    Specimen: Blood Updated: 07/03/21 0422     Lipase 11 U/L     Lipid Panel [[id number]] Collected: 07/03/21 0336    Specimen: Blood Updated: 07/03/21 0422     Total " Cholesterol 142 mg/dL      Triglycerides 87 mg/dL      HDL Cholesterol 41 mg/dL      LDL Cholesterol  84 mg/dL      VLDL Cholesterol 17 mg/dL      LDL/HDL Ratio 2.04    Narrative:      Cholesterol Reference Ranges  (U.S. Department of Health and Human Services ATP III Classifications)    Desirable          <200 mg/dL  Borderline High    200-239 mg/dL  High Risk          >240 mg/dL      Triglyceride Reference Ranges  (U.S. Department of Health and Human Services ATP III Classifications)    Normal           <150 mg/dL  Borderline High  150-199 mg/dL  High             200-499 mg/dL  Very High        >500 mg/dL    HDL Reference Ranges  (U.S. Department of Health and Human Services ATP III Classifcations)    Low     <40 mg/dl (major risk factor for CHD)  High    >60 mg/dl ('negative' risk factor for CHD)        LDL Reference Ranges  (U.S. Department of Health and Human Services ATP III Classifcations)    Optimal          <100 mg/dL  Near Optimal     100-129 mg/dL  Borderline High  130-159 mg/dL  High             160-189 mg/dL  Very High        >189 mg/dL    Magnesium [[id number]]  (Normal) Collected: 07/03/21 0336    Specimen: Blood Updated: 07/03/21 0422     Magnesium 1.6 mg/dL     Calcium, Ionized [[id number]]  (Abnormal) Collected: 07/03/21 0336    Specimen: Blood Updated: 07/03/21 0419     Ionized Calcium 4.45 mg/dL     Hemoglobin A1c [[id number]]  (Abnormal) Collected: 07/03/21 0336    Specimen: Blood Updated: 07/03/21 0411     Hemoglobin A1C 9.70 %     Narrative:      Hemoglobin A1C Ranges:    Increased Risk for Diabetes  5.7% to 6.4%  Diabetes                     >= 6.5%  Diabetic Goal                < 7.0%    COVID-19 and FLU A/B PCR - Swab, Nasopharynx [[id number]]  (Normal) Collected: 07/02/21 2326    Specimen: Swab from Nasopharynx Updated: 07/02/21 2909     COVID19 Not Detected     Influenza A PCR Not Detected     Influenza B PCR Not Detected    Narrative:      Fact sheet for providers:  https://www.fda.gov/media/604458/download    Fact sheet for patients: https://www.fda.gov/media/770662/download    Test performed by PCR.    Extra Tubes [[id number]] Collected: 07/02/21 2202    Specimen: Blood, Venous Line Updated: 07/02/21 2315    Narrative:      The following orders were created for panel order Extra Tubes.  Procedure                               Abnormality         Status                     ---------                               -----------         ------                     Gold Top - SST[[id number]]                                   Final result                 Please view results for these tests on the individual orders.    Gold Top - SST [[id number]] Collected: 07/02/21 2202    Specimen: Blood Updated: 07/02/21 2315     Extra Tube Hold for add-ons.     Comment: Auto resulted.       Lipase [[id number]]  (Normal) Collected: 07/02/21 2202    Specimen: Blood Updated: 07/02/21 2230     Lipase 14 U/L     Lactic Acid, Plasma [[id number]]  (Normal) Collected: 07/02/21 2202    Specimen: Blood Updated: 07/02/21 2226     Lactate 1.5 mmol/L           Imaging Results (Most Recent)     Procedure Component Value Units Date/Time    CT Abdomen Pelvis With Contrast [[id number]] Collected: 07/02/21 2200     Updated: 07/02/21 2255    Narrative:      CT ABDOMEN PELVIS WITH IV CONTRAST    INDICATION: 61 years Female; abdominal pain    TECHNIQUE:  CT scan of the abdomen and pelvis was performed with  IV contrast.  This exam was performed according to our  departmental dose-optimization program, which includes automated  exposure control, adjustment of the mA and/or kV according to  patient size and/or use of iterative reconstruction technique.     Comparison: 11/27/2017.    FINDINGS:  Liver: Unremarkable.  Gallbladder/Biliary tree: Status post cholecystectomy. No  significant biliary dilatation. Pneumobilia is probably within  normal limits for previous sphincterotomy.   Pancreas: Fatty atrophy of the  pancreas.  Spleen: Unremarkable.  Adrenals: Unremarkable.  Genitourinary: No hydronephrosis or nephrolithiasis. No bladder  wall thickening. Status post hysterectomy. No adnexal masses.  Gastrointestinal: No gastric wall thickening. Mild  circumferential wall thickening of the transverse colon, splenic  flexure, descending colon with scattered air-fluid levels without  significant surrounding fat stranding/edema probably represents  mild infectious colitis or inflammatory bowel disease. No free  air or free fluid. Status post appendectomy.  Peritoneum: Unremarkable.  Vasculature: Mild atherosclerosis of the aorta and iliac arteries  including the mesenteric branches.  Lymph nodes: No pathologically enlarged lymph nodes.  Bones: Mild degenerative changes of the bilateral hips and lower  lumbar spine. Neurostimulator electrode terminates in the left S3  foramen.  Soft tissues: Focal areas of skin thickening with subcutaneous  edema/fat stranding in the abdominal wall, left greater than  right, could be related to subcutaneous injections.  Incidental findings: Scattered coronary artery calcifications.  Elevation of the right hemidiaphragm of unknown etiology.       Impression:      Mild circumferential wall thickening of the transverse colon,  splenic flexure, descending colon with scattered air-fluid levels  without significant surrounding fat stranding/edema probably  represents mild infectious colitis or inflammatory bowel disease.    Electronically signed by:  Jenny Garcia  7/2/2021 10:54 PM CDT  Workstation: [id number]    XR Chest 1 View [[id number]] Collected: 07/02/21 2120     Updated: 07/02/21 2143    Narrative:      CLINICAL INDICATION: Central line placement    TECHNIQUE: Single view of the chest    COMPARISON: 7/2/2021 at 8:49 PM    FINDINGS: Previously seen right internal jugular catheter has  been removed in the interval. A new left internal jugular  catheter terminates in the superior vena cava. Elevation of  the  right hemidiaphragm. Lungs are clear. No pleural effusion or  pneumothorax. Cardiomediastinal silhouette is prominent.  Degenerative changes in the spine and bilateral shoulders.  Postsurgical changes of median sternotomy.      Impression:      1.  A new left internal jugular catheter terminates in the  superior vena cava. No pneumothorax.  2.  No acute cardiopulmonary disease.  3.  Prominent cardiac silhouette.    Electronically signed by:  Maya Grace MD  7/2/2021 9:42 PM  CDT Workstation: [id number]    XR Chest 1 View [[id number]] Collected: 07/02/21 2050     Updated: 07/02/21 2117    Narrative:      CLINICAL INDICATION: s/p central line placement    TECHNIQUE: Single view of the chest    COMPARISON: 5/17/2021.    FINDINGS: Right internal jugular catheter terminates in the right  subclavian vein. Mild elevation of the right hemidiaphragm. Lung  volumes are diminished. Lungs are clear. No pleural effusion or  pneumothorax. Cardiomediastinal silhouette is enlarged.  Postsurgical changes of median sternotomy. Cholecystectomy clips  in the right upper quadrant.      Impression:      1.  Right internal jugular catheter terminates in the right  subclavian vein. Recommend repositioning.  2.  No acute cardiopulmonary disease.  3.  Enlarged cardiomediastinal silhouette.    Electronically signed by:  Maya Grace MD  7/2/2021 9:16 PM  CDT Workstation: [id number]           Physician Progress Notes (most recent note)      Mahin Esteves MD at 07/05/21 1811              [facility] Medicine Services  INPATIENT PROGRESS NOTE    Length of Stay: 3  Date of Admission: 7/2/2021  Primary Care Physician: Kimberly Ferguson APRN    Subjective   Chief Complaint: Abdominal pain  HPI: Patient states he still has some abdominal cramping.  It is improved, but is still present.    Review of Systems   Constitutional: Negative for appetite change, chills, fatigue, fever and unexpected  weight change.   Respiratory: Negative for cough, choking, chest tightness, shortness of breath and wheezing.    Cardiovascular: Negative for chest pain, palpitations and leg swelling.   Gastrointestinal: Positive for abdominal pain. Negative for blood in stool, constipation, diarrhea, nausea and vomiting.   Genitourinary: Negative for dysuria, flank pain and hematuria.   Neurological: Negative for dizziness, seizures, syncope, speech difficulty, weakness, light-headedness, numbness and headaches.   Hematological: Does not bruise/bleed easily.        All pertinent negatives and positives are as above. All other systems have been reviewed and are negative unless otherwise stated.     Objective    Temp:  [97.2 °F (36.2 °C)-98.1 °F (36.7 °C)] 97.3 °F (36.3 °C)  Heart Rate:  [] 77  Resp:  [16-18] 18  BP: (105-164)/(51-71) 164/70    Physical Exam  Vitals reviewed.   Constitutional:       Appearance: She is well-developed.   HENT:      Head: Normocephalic and atraumatic.   Eyes:      Pupils: Pupils are equal, round, and reactive to light.   Cardiovascular:      Rate and Rhythm: Normal rate and regular rhythm.      Heart sounds: Normal heart sounds. No murmur heard.   No friction rub. No gallop.    Pulmonary:      Effort: Pulmonary effort is normal. No respiratory distress.      Breath sounds: Normal breath sounds. No wheezing or rales.   Chest:      Chest wall: No tenderness.   Abdominal:      General: Bowel sounds are normal. There is no distension.      Palpations: Abdomen is soft.      Tenderness: There is generalized abdominal tenderness. There is no guarding.   Musculoskeletal:      Cervical back: Normal range of motion and neck supple.      Comments: Left BKA dressing clean dry and intact   Skin:     General: Skin is warm and dry.   Psychiatric:         Behavior: Behavior normal.         Thought Content: Thought content normal.         Results Review:  I have reviewed the labs, radiology results, and  diagnostic studies.    Laboratory Data:   Results from last 7 days   Lab Units 07/05/21  0412 07/04/21  0845 07/03/21  0904 07/03/21  0336 07/02/21  2202   SODIUM mmol/L 139 139  --  131* 133*   POTASSIUM mmol/L 4.2 3.9  --  4.4 4.0   CHLORIDE mmol/L 104 104  --  93* 95*   CO2 mmol/L 29.0 27.0  --  21.0* 25.0   BUN mg/dL 10 22*  --  34* 32*   CREATININE mg/dL 1.02* 1.32*  --  2.11* 2.00*   GLUCOSE mg/dL 189* 161* 443* 392* 241*   CALCIUM mg/dL 9.4 9.7  --  9.3 9.3   BILIRUBIN mg/dL  --  0.3  --  0.4 0.3   ALK PHOS U/L  --  103  --  139* 129*   ALT (SGPT) U/L  --  12  --  18 19   AST (SGOT) U/L  --  14  --  16 18   ANION GAP mmol/L 6.0 8.0  --  17.0* 13.0     Estimated Creatinine Clearance: 77.2 mL/min (A) (by C-G formula based on SCr of 1.02 mg/dL (H)).  Results from last 7 days   Lab Units 07/04/21  0845 07/03/21  0336   MAGNESIUM mg/dL 1.7 1.6         Results from last 7 days   Lab Units 07/05/21  0412 07/04/21  0845 07/03/21  0619 07/02/21  2202   WBC 10*3/mm3 11.78* 13.20* 28.39* 24.21*   HEMOGLOBIN g/dL 11.0* 10.6* 12.5 11.5*   HEMATOCRIT % 34.2 32.4* 37.8 35.2   PLATELETS 10*3/mm3 323 330 447 463*           Culture Data:   Blood Culture   Date Value Ref Range Status   07/02/2021 No growth at 2 days  Preliminary   07/02/2021 No growth at 2 days  Preliminary     No results found for: URINECX  No results found for: RESPCX  No results found for: WOUNDCX  No results found for: STOOLCX  No components found for: BODYFLD    Radiology Data:   Imaging Results (Last 24 Hours)     ** No results found for the last 24 hours. **          I have reviewed the patient's current medications.     Assessment/Plan     Active Hospital Problems    Diagnosis    • Acute colitis        Plan:  1.  Acute colitis: Continue IV fluids and antibiotics.  2.  Septic shock: Resolved.  3.  Acute kidney injury: Significantly improved.  Creatinine near baseline.  4.  Diabetes mellitus: Continue current treatment.  Glucose less than 200 for the most  part.  5.  Left BKA: Okay to take staples out tomorrow.  6.  DVT prophylaxis: Lovenox.        The patient was evaluated during the global COVID-19 pandemic, and the diagnosis was suspected/considered upon their initial presentation.  Evaluation, treatment, and testing were consistent with current guidelines for patients who present with complaints or symptoms that may be related to COVID-19.    I confirmed that the patient's Advance Care Plan is present, code status is documented, or surrogate decision maker is listed in the patient's medical record.       Discharge Planning: I expect patient to be discharged to home in 2-3 days.        This document has been electronically signed by Mahin Esteves MD on July 5, 2021 18:11 CDT        Electronically signed by Mahin Esteves MD at 07/05/21 6960       Consult Notes (most recent note)    No notes of this type exist for this encounter.

## 2021-07-06 NOTE — PROGRESS NOTES
[facility] Medicine Services  INPATIENT PROGRESS NOTE    Length of Stay: 4  Date of Admission: 7/2/2021  Primary Care Physician: Marty, BEBE Luu    Subjective   Chief Complaint: Abdominal pain  HPI: Patient states she is having worsening abdominal pain after oral intake.    Review of Systems   Constitutional: Negative for appetite change, chills, fatigue, fever and unexpected weight change.   Respiratory: Negative for cough, choking, chest tightness, shortness of breath and wheezing.    Cardiovascular: Negative for chest pain, palpitations and leg swelling.   Gastrointestinal: Positive for abdominal pain. Negative for blood in stool, constipation, diarrhea, nausea and vomiting.   Genitourinary: Negative for dysuria, flank pain and hematuria.   Neurological: Negative for dizziness, seizures, syncope, speech difficulty, weakness, light-headedness, numbness and headaches.   Hematological: Does not bruise/bleed easily.        All pertinent negatives and positives are as above. All other systems have been reviewed and are negative unless otherwise stated.     Objective    Temp:  [96.8 °F (36 °C)-98 °F (36.7 °C)] 98 °F (36.7 °C)  Heart Rate:  [65-84] 65  Resp:  [18] 18  BP: (138-190)/(63-72) 178/72    Physical Exam  Vitals reviewed.   Constitutional:       Appearance: She is well-developed.   HENT:      Head: Normocephalic and atraumatic.   Eyes:      Pupils: Pupils are equal, round, and reactive to light.   Cardiovascular:      Rate and Rhythm: Normal rate and regular rhythm.      Heart sounds: Normal heart sounds. No murmur heard.   No friction rub. No gallop.    Pulmonary:      Effort: Pulmonary effort is normal. No respiratory distress.      Breath sounds: Normal breath sounds. No wheezing or rales.   Chest:      Chest wall: No tenderness.   Abdominal:      General: Bowel sounds are normal. There is no distension.      Palpations: Abdomen is soft.      Tenderness:  There is generalized abdominal tenderness. There is no guarding.   Musculoskeletal:      Cervical back: Normal range of motion and neck supple.      Comments: Left BKA dressing clean dry and intact   Skin:     General: Skin is warm and dry.   Psychiatric:         Behavior: Behavior normal.         Thought Content: Thought content normal.         Results Review:  I have reviewed the labs, radiology results, and diagnostic studies.    Laboratory Data:   Results from last 7 days   Lab Units 07/06/21  0626 07/05/21  0412 07/04/21  0845 07/03/21  0336 07/02/21  2202   SODIUM mmol/L 138 139 139 131* 133*   POTASSIUM mmol/L 3.4* 4.2 3.9 4.4 4.0   CHLORIDE mmol/L 103 104 104 93* 95*   CO2 mmol/L 26.0 29.0 27.0 21.0* 25.0   BUN mg/dL 4* 10 22* 34* 32*   CREATININE mg/dL 0.80 1.02* 1.32* 2.11* 2.00*   GLUCOSE mg/dL 173* 189* 161* 392* 241*   CALCIUM mg/dL 9.2 9.4 9.7 9.3 9.3   BILIRUBIN mg/dL  --   --  0.3 0.4 0.3   ALK PHOS U/L  --   --  103 139* 129*   ALT (SGPT) U/L  --   --  12 18 19   AST (SGOT) U/L  --   --  14 16 18   ANION GAP mmol/L 9.0 6.0 8.0 17.0* 13.0     Estimated Creatinine Clearance: 98.4 mL/min (by C-G formula based on SCr of 0.8 mg/dL).  Results from last 7 days   Lab Units 07/04/21  0845 07/03/21  0336   MAGNESIUM mg/dL 1.7 1.6         Results from last 7 days   Lab Units 07/06/21  0645 07/05/21  0412 07/04/21  0845 07/03/21  0619 07/02/21  2202   WBC 10*3/mm3 9.19 11.78* 13.20* 28.39* 24.21*   HEMOGLOBIN g/dL 10.1* 11.0* 10.6* 12.5 11.5*   HEMATOCRIT % 31.2* 34.2 32.4* 37.8 35.2   PLATELETS 10*3/mm3 328 323 330 447 463*           Culture Data:   No results found for: BLOODCX  No results found for: URINECX  No results found for: RESPCX  No results found for: WOUNDCX  No results found for: STOOLCX  No components found for: BODYFLD    Radiology Data:   Imaging Results (Last 24 Hours)     ** No results found for the last 24 hours. **          I have reviewed the patient's current medications.      Assessment/Plan     Active Hospital Problems    Diagnosis    • Acute colitis        Plan:  1.  Acute colitis: Continue IV fluids and antibiotics.  Will back down on diet.  2.  Septic shock: Resolved.  3.  Acute kidney injury: Resolved.  4.  Diabetes mellitus: Continue current treatment.  Glucose less than 200 for the most part.  5.  Left BKA: Staples removed today.  6.  Hypertension: We will restart home lisinopril.  7.  DVT prophylaxis: Lovenox.        The patient was evaluated during the global COVID-19 pandemic, and the diagnosis was suspected/considered upon their initial presentation.  Evaluation, treatment, and testing were consistent with current guidelines for patients who present with complaints or symptoms that may be related to COVID-19.    I confirmed that the patient's Advance Care Plan is present, code status is documented, or surrogate decision maker is listed in the patient's medical record.       Discharge Planning: I expect patient to be discharged to home in 2-3 days.        This document has been electronically signed by Mahin Esteves MD on July 6, 2021 17:56 CDT

## 2021-07-06 NOTE — PLAN OF CARE
Problem: Adult Inpatient Plan of Care  Goal: Plan of Care Review  Outcome: Ongoing, Progressing  Flowsheets  Taken 7/5/2021 2346 by Chantale Stubbs RN  Progress: improving  Outcome Summary: pt vs stable, taking staples out tomm, pt still having some loose stools.  Taken 7/5/2021 1628 by Kristel Esteves, RN  Plan of Care Reviewed With: patient     Problem: Adult Inpatient Plan of Care  Goal: Patient-Specific Goal (Individualized)  Outcome: Ongoing, Progressing     Problem: Adult Inpatient Plan of Care  Goal: Absence of Hospital-Acquired Illness or Injury  Outcome: Ongoing, Progressing     Problem: Adult Inpatient Plan of Care  Goal: Absence of Hospital-Acquired Illness or Injury  Intervention: Identify and Manage Fall Risk  Recent Flowsheet Documentation  Taken 7/5/2021 2316 by Chantale Stubbs RN  Safety Promotion/Fall Prevention:   activity supervised   assistive device/personal items within reach   clutter free environment maintained   fall prevention program maintained   safety round/check completed   nonskid shoes/slippers when out of bed  Taken 7/5/2021 2130 by Chantale Stubbs RN  Safety Promotion/Fall Prevention:   activity supervised   assistive device/personal items within reach   clutter free environment maintained   fall prevention program maintained   safety round/check completed   nonskid shoes/slippers when out of bed  Taken 7/5/2021 1946 by Chantale Stubbs, RN  Safety Promotion/Fall Prevention:   safety round/check completed   nonskid shoes/slippers when out of bed   activity supervised   assistive device/personal items within reach   clutter free environment maintained  Taken 7/5/2021 1720 by Chantale Stubbs RN  Safety Promotion/Fall Prevention:   activity supervised   assistive device/personal items within reach   clutter free environment maintained   fall prevention program maintained   safety round/check completed   muscle strengthening facilitated     Problem: Adult  Inpatient Plan of Care  Goal: Absence of Hospital-Acquired Illness or Injury  Intervention: Prevent Infection  Recent Flowsheet Documentation  Taken 7/5/2021 2130 by Chantale Stubbs RN  Infection Prevention: rest/sleep promoted  Taken 7/5/2021 1946 by Chantale Stubbs RN  Infection Prevention: rest/sleep promoted  Taken 7/5/2021 1720 by Chantale Stubbs RN  Infection Prevention: rest/sleep promoted     Problem: Adult Inpatient Plan of Care  Goal: Optimal Comfort and Wellbeing  Outcome: Ongoing, Progressing     Problem: Adult Inpatient Plan of Care  Goal: Optimal Comfort and Wellbeing  Intervention: Provide Person-Centered Care  Recent Flowsheet Documentation  Taken 7/5/2021 1946 by Chantale Stubbs RN  Trust Relationship/Rapport:   care explained   questions answered   thoughts/feelings acknowledged     Problem: Adult Inpatient Plan of Care  Goal: Readiness for Transition of Care  Outcome: Ongoing, Progressing     Problem: Fall Injury Risk  Goal: Absence of Fall and Fall-Related Injury  Outcome: Ongoing, Progressing     Problem: Fall Injury Risk  Goal: Absence of Fall and Fall-Related Injury  Intervention: Promote Injury-Free Environment  Recent Flowsheet Documentation  Taken 7/5/2021 2316 by Chantale Stubbs RN  Safety Promotion/Fall Prevention:   activity supervised   assistive device/personal items within reach   clutter free environment maintained   fall prevention program maintained   safety round/check completed   nonskid shoes/slippers when out of bed  Taken 7/5/2021 2130 by Chantale Stubbs RN  Safety Promotion/Fall Prevention:   activity supervised   assistive device/personal items within reach   clutter free environment maintained   fall prevention program maintained   safety round/check completed   nonskid shoes/slippers when out of bed  Taken 7/5/2021 1946 by Chantale Stubbs RN  Safety Promotion/Fall Prevention:   safety round/check completed   nonskid shoes/slippers when out of  bed   activity supervised   assistive device/personal items within reach   clutter free environment maintained  Taken 7/5/2021 1720 by Chantale Stubbs RN  Safety Promotion/Fall Prevention:   activity supervised   assistive device/personal items within reach   clutter free environment maintained   fall prevention program maintained   safety round/check completed   muscle strengthening facilitated     Problem: Skin Injury Risk Increased  Goal: Skin Health and Integrity  Outcome: Ongoing, Progressing     Problem: Diabetes Comorbidity  Goal: Blood Glucose Level Within Desired Range  Outcome: Ongoing, Progressing     Problem: Nausea and Vomiting  Goal: Fluid and Electrolyte Balance  Outcome: Ongoing, Progressing     Problem: Nausea and Vomiting  Goal: Fluid and Electrolyte Balance  Intervention: Prevent and Manage Nausea and Vomiting  Recent Flowsheet Documentation  Taken 7/5/2021 1946 by Chantale Stubbs RN  Environmental Support: calm environment promoted     Problem: Adjustment to Illness (Sepsis/Septic Shock)  Goal: Optimal Coping  Intervention: Optimize Psychosocial Adjustment to Illness  Recent Flowsheet Documentation  Taken 7/5/2021 1946 by Chantale Stubbs RN  Supportive Measures: active listening utilized     Problem: Infection (Sepsis/Septic Shock)  Goal: Absence of Infection Signs and Symptoms  Intervention: Prevent or Manage Infection Progression  Recent Flowsheet Documentation  Taken 7/5/2021 2130 by Chantale Stubbs RN  Infection Prevention: rest/sleep promoted  Taken 7/5/2021 1946 by Chantale Stubbs RN  Infection Prevention: rest/sleep promoted  Taken 7/5/2021 1720 by Chantale Stubbs RN  Infection Prevention: rest/sleep promoted     Problem: Respiratory Compromise (Sepsis/Septic Shock)  Goal: Effective Oxygenation and Ventilation  Intervention: Promote Airway Secretion Clearance  Recent Flowsheet Documentation  Taken 7/5/2021 1946 by Chantale Stubbs RN  Activity Management:  activity adjusted per tolerance  Cough And Deep Breathing: done independently per patient     Problem: Diarrhea (Bowel Disease, Inflammatory)  Goal: Diarrhea Symptom Relief  Outcome: Ongoing, Progressing     Problem: Infection (Bowel Disease, Inflammatory)  Goal: Absence of Infection Signs and Symptoms  Outcome: Ongoing, Progressing     Problem: Nutrition Impaired (Bowel Disease, Inflammatory)  Goal: Optimal Nutrition  Outcome: Ongoing, Progressing     Problem: Nutrition Impaired (Bowel Disease, Inflammatory)  Goal: Optimal Nutrition  Intervention: Promote and Optimize Nutrition Intake  Recent Flowsheet Documentation  Taken 7/5/2021 1946 by Chantale Stubbs RN  Environmental Support: calm environment promoted     Problem: Pain (Bowel Disease, Inflammatory)  Goal: Acceptable Pain Control  Outcome: Ongoing, Progressing   Goal Outcome Evaluation:           Progress: improving  Outcome Summary: pt vs stable, taking staples out tomm, pt still having some loose stools.

## 2021-07-07 LAB
ANION GAP SERPL CALCULATED.3IONS-SCNC: 7 MMOL/L (ref 5–15)
BACTERIA SPEC AEROBE CULT: NORMAL
BACTERIA SPEC AEROBE CULT: NORMAL
BASOPHILS # BLD AUTO: 0.03 10*3/MM3 (ref 0–0.2)
BASOPHILS NFR BLD AUTO: 0.4 % (ref 0–1.5)
BUN SERPL-MCNC: 3 MG/DL (ref 6–20)
BUN/CREAT SERPL: 4.2 (ref 7–25)
CALCIUM SPEC-SCNC: 9.3 MG/DL (ref 8.6–10.5)
CHLORIDE SERPL-SCNC: 107 MMOL/L (ref 98–107)
CO2 SERPL-SCNC: 28 MMOL/L (ref 22–29)
CREAT SERPL-MCNC: 0.71 MG/DL (ref 0.57–1)
DEPRECATED RDW RBC AUTO: 43.7 FL (ref 37–54)
EOSINOPHIL # BLD AUTO: 0.37 10*3/MM3 (ref 0–0.4)
EOSINOPHIL NFR BLD AUTO: 5.2 % (ref 0.3–6.2)
ERYTHROCYTE [DISTWIDTH] IN BLOOD BY AUTOMATED COUNT: 13.9 % (ref 12.3–15.4)
GFR SERPL CREATININE-BSD FRML MDRD: 84 ML/MIN/1.73
GLUCOSE BLDC GLUCOMTR-MCNC: 106 MG/DL (ref 70–130)
GLUCOSE BLDC GLUCOMTR-MCNC: 144 MG/DL (ref 70–130)
GLUCOSE BLDC GLUCOMTR-MCNC: 151 MG/DL (ref 70–130)
GLUCOSE BLDC GLUCOMTR-MCNC: 64 MG/DL (ref 70–130)
GLUCOSE BLDC GLUCOMTR-MCNC: 83 MG/DL (ref 70–130)
GLUCOSE SERPL-MCNC: 91 MG/DL (ref 65–99)
HCT VFR BLD AUTO: 31.1 % (ref 34–46.6)
HGB BLD-MCNC: 10.2 G/DL (ref 12–15.9)
IMM GRANULOCYTES # BLD AUTO: 0.03 10*3/MM3 (ref 0–0.05)
IMM GRANULOCYTES NFR BLD AUTO: 0.4 % (ref 0–0.5)
LYMPHOCYTES # BLD AUTO: 2.27 10*3/MM3 (ref 0.7–3.1)
LYMPHOCYTES NFR BLD AUTO: 32.2 % (ref 19.6–45.3)
MCH RBC QN AUTO: 28.1 PG (ref 26.6–33)
MCHC RBC AUTO-ENTMCNC: 32.8 G/DL (ref 31.5–35.7)
MCV RBC AUTO: 85.7 FL (ref 79–97)
MONOCYTES # BLD AUTO: 0.54 10*3/MM3 (ref 0.1–0.9)
MONOCYTES NFR BLD AUTO: 7.6 % (ref 5–12)
NEUTROPHILS NFR BLD AUTO: 3.82 10*3/MM3 (ref 1.7–7)
NEUTROPHILS NFR BLD AUTO: 54.2 % (ref 42.7–76)
NRBC BLD AUTO-RTO: 0 /100 WBC (ref 0–0.2)
PLATELET # BLD AUTO: 325 10*3/MM3 (ref 140–450)
PMV BLD AUTO: 9.5 FL (ref 6–12)
POTASSIUM SERPL-SCNC: 3.4 MMOL/L (ref 3.5–5.2)
RBC # BLD AUTO: 3.63 10*6/MM3 (ref 3.77–5.28)
SODIUM SERPL-SCNC: 142 MMOL/L (ref 136–145)
WBC # BLD AUTO: 7.06 10*3/MM3 (ref 3.4–10.8)

## 2021-07-07 PROCEDURE — 85025 COMPLETE CBC W/AUTO DIFF WBC: CPT | Performed by: INTERNAL MEDICINE

## 2021-07-07 PROCEDURE — 25010000002 ONDANSETRON PER 1 MG: Performed by: INTERNAL MEDICINE

## 2021-07-07 PROCEDURE — 80048 BASIC METABOLIC PNL TOTAL CA: CPT | Performed by: INTERNAL MEDICINE

## 2021-07-07 PROCEDURE — 63710000001 INSULIN ASPART PER 5 UNITS: Performed by: HOSPITALIST

## 2021-07-07 PROCEDURE — 82962 GLUCOSE BLOOD TEST: CPT

## 2021-07-07 PROCEDURE — 97162 PT EVAL MOD COMPLEX 30 MIN: CPT

## 2021-07-07 PROCEDURE — 25010000002 PIPERACILLIN SOD-TAZOBACTAM PER 1 G: Performed by: INTERNAL MEDICINE

## 2021-07-07 PROCEDURE — 63710000001 INSULIN DETEMIR PER 5 UNITS: Performed by: INTERNAL MEDICINE

## 2021-07-07 PROCEDURE — 25010000002 PROCHLORPERAZINE 10 MG/2ML SOLUTION: Performed by: INTERNAL MEDICINE

## 2021-07-07 PROCEDURE — 25010000002 ENOXAPARIN PER 10 MG: Performed by: INTERNAL MEDICINE

## 2021-07-07 PROCEDURE — 25010000002 HYDROMORPHONE 1 MG/ML SOLUTION: Performed by: INTERNAL MEDICINE

## 2021-07-07 PROCEDURE — 25010000002 GLUCAGON (HUMAN RECOMBINANT) 1 MG RECONSTITUTED SOLUTION: Performed by: INTERNAL MEDICINE

## 2021-07-07 RX ORDER — DOCOSANOL 100 MG/G
CREAM TOPICAL
Status: DISCONTINUED | OUTPATIENT
Start: 2021-07-07 | End: 2021-07-12 | Stop reason: HOSPADM

## 2021-07-07 RX ADMIN — DOCOSANOL: 100 CREAM TOPICAL at 21:38

## 2021-07-07 RX ADMIN — DOCOSANOL: 100 CREAM TOPICAL at 18:07

## 2021-07-07 RX ADMIN — HYDROMORPHONE HYDROCHLORIDE 0.5 MG: 1 INJECTION, SOLUTION INTRAMUSCULAR; INTRAVENOUS; SUBCUTANEOUS at 15:29

## 2021-07-07 RX ADMIN — FOLIC ACID 1 MG: 1 TABLET ORAL at 08:44

## 2021-07-07 RX ADMIN — INSULIN ASPART 2 UNITS: 100 INJECTION, SOLUTION INTRAVENOUS; SUBCUTANEOUS at 12:20

## 2021-07-07 RX ADMIN — VENLAFAXINE HYDROCHLORIDE 150 MG: 75 CAPSULE, EXTENDED RELEASE ORAL at 08:44

## 2021-07-07 RX ADMIN — HYDROMORPHONE HYDROCHLORIDE 0.5 MG: 1 INJECTION, SOLUTION INTRAMUSCULAR; INTRAVENOUS; SUBCUTANEOUS at 20:40

## 2021-07-07 RX ADMIN — SODIUM CHLORIDE, POTASSIUM CHLORIDE, SODIUM LACTATE AND CALCIUM CHLORIDE 100 ML/HR: 600; 310; 30; 20 INJECTION, SOLUTION INTRAVENOUS at 06:20

## 2021-07-07 RX ADMIN — SODIUM CHLORIDE, PRESERVATIVE FREE 10 ML: 5 INJECTION INTRAVENOUS at 20:43

## 2021-07-07 RX ADMIN — LISINOPRIL 20 MG: 20 TABLET ORAL at 09:27

## 2021-07-07 RX ADMIN — FAMOTIDINE 20 MG: 10 INJECTION INTRAVENOUS at 08:45

## 2021-07-07 RX ADMIN — PIPERACILLIN SODIUM AND TAZOBACTAM SODIUM 3.38 G: 3; .375 INJECTION, POWDER, LYOPHILIZED, FOR SOLUTION INTRAVENOUS at 14:09

## 2021-07-07 RX ADMIN — TAMSULOSIN HYDROCHLORIDE 0.4 MG: 0.4 CAPSULE ORAL at 08:44

## 2021-07-07 RX ADMIN — GABAPENTIN 300 MG: 300 CAPSULE ORAL at 14:09

## 2021-07-07 RX ADMIN — PIPERACILLIN SODIUM AND TAZOBACTAM SODIUM 3.38 G: 3; .375 INJECTION, POWDER, LYOPHILIZED, FOR SOLUTION INTRAVENOUS at 22:34

## 2021-07-07 RX ADMIN — GABAPENTIN 300 MG: 300 CAPSULE ORAL at 06:17

## 2021-07-07 RX ADMIN — PIPERACILLIN SODIUM AND TAZOBACTAM SODIUM 3.38 G: 3; .375 INJECTION, POWDER, LYOPHILIZED, FOR SOLUTION INTRAVENOUS at 06:43

## 2021-07-07 RX ADMIN — ONDANSETRON HYDROCHLORIDE 4 MG: 2 INJECTION, SOLUTION INTRAMUSCULAR; INTRAVENOUS at 15:00

## 2021-07-07 RX ADMIN — ATORVASTATIN CALCIUM 80 MG: 40 TABLET, FILM COATED ORAL at 08:44

## 2021-07-07 RX ADMIN — ARIPIPRAZOLE 15 MG: 15 TABLET ORAL at 08:44

## 2021-07-07 RX ADMIN — HYDROMORPHONE HYDROCHLORIDE 0.5 MG: 1 INJECTION, SOLUTION INTRAMUSCULAR; INTRAVENOUS; SUBCUTANEOUS at 02:23

## 2021-07-07 RX ADMIN — ENOXAPARIN SODIUM 40 MG: 40 INJECTION SUBCUTANEOUS at 08:45

## 2021-07-07 RX ADMIN — ASPIRIN 81 MG: 81 TABLET, FILM COATED ORAL at 08:44

## 2021-07-07 RX ADMIN — INSULIN DETEMIR 40 UNITS: 100 INJECTION, SOLUTION SUBCUTANEOUS at 09:27

## 2021-07-07 RX ADMIN — PROCHLORPERAZINE EDISYLATE 5 MG: 5 INJECTION INTRAMUSCULAR; INTRAVENOUS at 20:36

## 2021-07-07 RX ADMIN — CLOPIDOGREL BISULFATE 75 MG: 75 TABLET ORAL at 09:27

## 2021-07-07 RX ADMIN — GLUCAGON HYDROCHLORIDE 1 MG: KIT at 06:37

## 2021-07-07 RX ADMIN — SODIUM CHLORIDE, POTASSIUM CHLORIDE, SODIUM LACTATE AND CALCIUM CHLORIDE 100 ML/HR: 600; 310; 30; 20 INJECTION, SOLUTION INTRAVENOUS at 16:32

## 2021-07-07 RX ADMIN — HYDROMORPHONE HYDROCHLORIDE 0.5 MG: 1 INJECTION, SOLUTION INTRAMUSCULAR; INTRAVENOUS; SUBCUTANEOUS at 08:44

## 2021-07-07 NOTE — SIGNIFICANT NOTE
07/07/21 1452   OTHER   Discipline occupational therapist   Rehab Time/Intention   Session Not Performed patient/family declined, not feeling well  (Pt reporting nausea - requesting OT check back - will check back as schedule allows)   Recommendation   OT - Next Appointment 07/08/21

## 2021-07-07 NOTE — THERAPY EVALUATION
Patient Name: Ariana Martinez  : 1960    MRN: [id number]                              Today's Date: 2021       Admit Date: 2021    Visit Dx:     ICD-10-CM ICD-9-CM   1. Acute colitis  K52.9 558.9   2. Acute kidney injury (CMS/HCC)  N17.9 584.9   3. Impaired physical mobility  Z74.09 781.99     Patient Active Problem List   Diagnosis   • 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, with long-term current use of insulin (CMS/HCC)   • Closed nondisplaced fracture of fifth left metatarsal bone   • MAURICIO (generalized anxiety disorder)   • Depression, major, recurrent, moderate (CMS/[facility])   • GERD without esophagitis   • Long term prescription opiate use   • Mixed hyperlipidemia   • Vitamin D deficiency   • Seasonal allergic rhinitis   • Restrictive lung disease secondary to obesity   • Adult body mass index 37.0-37.9   • Snoring   • Class 3 severe obesity due to excess calories without serious comorbidity with body mass index (BMI) of 40.0 to 44.9 in adult (CMS/[facility])   • (HFpEF) heart failure with preserved ejection fraction (CMS/HCC)   • Type 2 diabetes mellitus with diabetic polyneuropathy (CMS/[facility])   • Cyanocobalamin deficiency   • CAD (coronary artery disease)   • Hypertension   • Meniere's disease   • Gastroparesis   • Otitis media with effusion, left   • Pulmonary hypertension (CMS/HCC)   • Displaced fracture of fifth metatarsal bone, left foot, subsequent encounter for fracture with nonunion   • Pes cavus   • Primary osteoarthritis involving multiple joints   • Generalized anxiety disorder   • Chronic right-sided low back pain with right-sided sciatica   • Chest pain   • Thrombocytosis (CMS/HCC)   • Leukocytosis   • Iron deficiency   • Adverse effect of iron   • Hindfoot varus, acquired, left   • Chest pain due to myocardial ischemia   • Nonrheumatic tricuspid valve regurgitation   • Iron deficiency anemia due to chronic blood loss   • Malaise and fatigue   • Nonunion of  subtalar arthrodesis   • Ankle arthritis   • Cellulitis of left lower extremity   • Urinary retention   • Acute bacterial endocarditis   • Staphylococcus aureus bacteremia   • Infection due to Acinetobacter species   • Endocarditis   • Left foot infection   • Uncontrolled hypertension   • Occlusion and stenosis of bilateral carotid arteries   • S/P BKA (below knee amputation) unilateral, left (CMS/HCC)   • Phantom pain after amputation of lower extremity (CMS/[facility])   • S/P CABG (coronary artery bypass graft)   • Acute colitis     Past Medical History:   Diagnosis Date   • Angina, class IV (CMS/HCC)    • Anxiety    • Anxiety and depression    • Arthritis    • Benign paroxysmal positional vertigo    • Bladder disorder     has bladder stimulator   • Carpal tunnel syndrome    • CHF (congestive heart failure) (CMS/[facility])    • Chronic pain    • Coronary atherosclerosis     hx CABG 2005.  is followed by Dr Kwon   • Depression    • Diabetes mellitus (CMS/HCC)     Type 2, controlled   • Diabetic polyneuropathy (CMS/HCC)    • Disease of thyroid gland    • Elevated cholesterol    • Female stress incontinence    • Foot pain, left    • Full dentures    • Gastroparesis    • GERD (gastroesophageal reflux disease)    • Hyperlipidemia    • Hypertension    • Low back pain    • Malaise and fatigue    • Multiple joint pain    • Obesity     Refuses to be weighed   • Occlusion and stenosis of bilateral carotid arteries 6/18/2021   • Otalgia     Both   • Perforation of tympanic membrane     Left   • Postoperative wound infection    • Vitamin D deficiency    • Wears glasses     reading     Past Surgical History:   Procedure Laterality Date   • ABDOMINAL SURGERY     • AMPUTATION FOOT     • ANGIOPLASTY      coronary   • ANKLE ARTHROSCOPY Left 2/26/2021    Procedure: Left foot hardware removal, ankle arthroscopy, bone grafting , left foot exostectomy;  Surgeon: Ignacio Lord DPM;  Location: [facility];  Service: Podiatry;  Laterality:  Left;   • BREAST BIOPSY Right    • CARDIAC CATHETERIZATION     • CARDIAC CATHETERIZATION N/A 6/20/2017    Procedure: Right Heart Cath;  Surgeon: Can Kwon MD PhD;  Location: [facility] CATH INVASIVE LOCATION;  Service:    • CARDIAC CATHETERIZATION N/A 2/18/2020    Procedure: Left Heart Cath;  Surgeon: Catalina Cooper MD;  Location: [facility] CATH INVASIVE LOCATION;  Service: Cardiology;  Laterality: N/A;   • CARPAL TUNNEL RELEASE     • CHOLECYSTECTOMY     • COLONOSCOPY N/A 6/24/2020    Procedure: COLONOSCOPY;  Surgeon: Julián Maldonado MD;  Location: [facility] ENDOSCOPY;  Service: Gastroenterology;  Laterality: N/A;   • CORONARY ARTERY BYPASS GRAFT  2005   • ENDOSCOPY N/A 10/19/2018    Procedure: ESOPHAGOGASTRODUODENOSCOPY possible dilation;  Surgeon: Julián Maldonado MD;  Location: [facility] ENDOSCOPY;  Service: Gastroenterology   • ENDOSCOPY N/A 6/24/2020    Procedure: ESOPHAGOGASTRODUODENOSCOPY WED appt please;  Surgeon: Julián Maldonado MD;  Location: [facility] ENDOSCOPY;  Service: Gastroenterology;  Laterality: N/A;   • ENDOSCOPY AND COLONOSCOPY     • FOOT SURGERY      Toes   • FOOT SURGERY     • GASTRIC BANDING      Revision, laparoscopic   • HYSTERECTOMY     • INCISION AND DRAINAGE LEG Left 3/12/2021    Procedure: Left ankle arthroscopic irrigation and debridement, screw removal;  Surgeon: Ignacio Lord DPM;  Location: [facility] OR;  Service: Podiatry;  Laterality: Left;   • MOUTH SURGERY     • SALPINGO OOPHORECTOMY     • SHOULDER SURGERY     • SUBTALAR ARTHRODESIS Left 1/16/2019    Procedure: LEFT FOOT HARDWARE REMOVAL, FIFTH METATARSAL , OPEN REDUCTION INTERNAL FIXATION, CALCANEAL OSTEOTOMY;  Surgeon: Ignacio Lord DPM;  Location: [facility] OR;  Service: Podiatry   • SUBTALAR ARTHRODESIS Left 10/16/2019    Procedure: foot hardware removal, subtalar joint fusion  possible de/reattachment of achilles tendon        (c-arm);  Surgeon: Ignacio Lord DPM;  Location: [facility] OR;  Service: Podiatry   •  SUBTALAR ARTHRODESIS Left 9/30/2020    Procedure: subtalar, talonavicular joint arthrodesis.  Removal hardware.          (c-arm);  Surgeon: Ignacio Lord DPM;  Location: [facility];  Service: Podiatry;  Laterality: Left;   • TRANSESOPHAGEAL ECHOCARDIOGRAM (LAMONTE)      With color flow     General Information     Row Name 07/07/21 1120          Physical Therapy Time and Intention    Document Type  evaluation  -CZ     Mode of Treatment  individual therapy;physical therapy  -CZ     Row Name 07/07/21 1120          General Information    Patient Profile Reviewed  yes  -CZ     Prior Level of Function  independent:;transfer;w/c or scooter;bed mobility;min assist:;gait  -CZ     Existing Precautions/Restrictions  fall  -CZ     Barriers to Rehab  previous functional deficit;medically complex  -CZ     Row Name 07/07/21 1120          Living Environment    Lives With  spouse  -CZ     Row Name 07/07/21 1120          Home Main Entrance    Number of Stairs, Main Entrance  other (see comments)  -CZ     Row Name 07/07/21 1120          Stairs Within Home, Primary    Stairs, Within Home, Primary  Ramp to enter, spouse pushes patient in manual w/c. Mod I with stand pivot transfers to w/c.  -CZ     Number of Stairs, Within Home, Primary  none  -CZ     Row Name 07/07/21 1120          Cognition    Orientation Status (Cognition)  oriented x 4  -CZ     Row Name 07/07/21 1120          Safety Issues, Functional Mobility    Impairments Affecting Function (Mobility)  strength;endurance/activity tolerance;balance  -CZ       User Key  (r) = Recorded By, (t) = Taken By, (c) = Cosigned By    Initials Name Provider Type    CZ Carlito Montoya, PT Physical Therapist        Mobility     Row Name 07/07/21 1120          Bed Mobility    Bed Mobility  supine-sit;sit-supine  -CZ     Supine-Sit [place] (Bed Mobility)  minimum assist (75% patient effort)  -CZ     Sit-Supine [place] (Bed Mobility)  minimum assist (75% patient effort)  -CZ      Assistive Device (Bed Mobility)  head of bed elevated;bed rails  -CZ     Comment (Bed Mobility)  Standard flat bed at home.  -CZ     Row Name 07/07/21 1120          Sit-Stand Transfer    Sit-Stand [place] (Transfers)  minimum assist (75% patient effort)  -CZ     Assistive Device (Sit-Stand Transfers)  walker, front-wheeled  -CZ     Row Name 07/07/21 1120          Gait/Stairs (Locomotion)    [place] Level (Gait)  minimum assist (75% patient effort)  -CZ     Assistive Device (Gait)  walker, front-wheeled  -CZ     Distance in Feet (Gait)  2 steps forward/back/sidestepping R.  -CZ     Comment (Gait/Stairs)  Poor ability to hop on R LE.  -CZ     Row Name 07/07/21 1120          Mobility    Extremity Weight-bearing Status  left lower extremity  -CZ     Left Lower Extremity (Weight-bearing Status)  -- Recent BKA  -CZ       User Key  (r) = Recorded By, (t) = Taken By, (c) = Cosigned By    Initials Name Provider Type    CZ Carlito Montoya, PT Physical Therapist        Obj/Interventions     Row Name 07/07/21 1120          Range of Motion Comprehensive    General Range of Motion  bilateral lower extremity ROM WFL  -CZ     Comment, General Range of Motion  L BKA.  -CZ     Row Name 07/07/21 1120          Strength Comprehensive (MMT)    General Manual Muscle Testing (MMT) Assessment  other (see comments)  -CZ     Comment, General Manual Muscle Testing (MMT) Assessment  BLEs: 3+/5 grossly.  -CZ     Row Name 07/07/21 1120          Sensory Assessment (Somatosensory)    Sensory Assessment (Somatosensory)  LE sensation intact  -CZ       User Key  (r) = Recorded By, (t) = Taken By, (c) = Cosigned By    Initials Name Provider Type    CZ Carlito Montoya, PT Physical Therapist        Goals/Plan     Row Name 07/07/21 1120          Bed Mobility Goal 1 (PT)    Activity/Assistive Device (Bed Mobility Goal 1, PT)  sit to supine/supine to sit  -CZ     [place] Level/Cues Needed (Bed Mobility Goal 1, PT)  independent  -CZ     Time  Frame (Bed Mobility Goal 1, PT)  by discharge  -CZ     Strategies/Barriers (Bed Mobility Goal 1, PT)  HOB flat, no bed rails.  -CZ     Progress/Outcomes (Bed Mobility Goal 1, PT)  goal not met  -CZ     Row Name 07/07/21 1120          Transfer Goal 1 (PT)    Activity/Assistive Device (Transfer Goal 1, PT)  sit-to-stand/stand-to-sit;bed-to-chair/chair-to-bed;walker, rolling  -CZ     [place] Level/Cues Needed (Transfer Goal 1, PT)  supervision required  -CZ     Time Frame (Transfer Goal 1, PT)  by discharge  -CZ     Strategies/Barriers (Transfers Goal 1, PT)  L BKA.  -CZ     Progress/Outcome (Transfer Goal 1, PT)  goal not met  -CZ     Row Name 07/07/21 1120          Gait Training Goal 1 (PT)    Activity/Assistive Device (Gait Training Goal 1, PT)  walker, rolling  -CZ     [place] Level (Gait Training Goal 1, PT)  contact guard assist  -CZ     Distance (Gait Training Goal 1, PT)  10' x 1.  -CZ     Time Frame (Gait Training Goal 1, PT)  by discharge  -CZ     Strategies/Barriers (Gait Training Goal 1, PT)  L BKA, recent fall during ambulation.  -CZ     Progress/Outcome (Gait Training Goal 1, PT)  goal not met  -CZ     Row Name 07/07/21 1120          ROM Goal 1 (PT)    ROM Goal 1 (PT)  Patient will re-wrap L BKA in figure-8 pattern, each shift, with ace wrap.  -CZ     Time Frame (ROM Goal 1, PT)  by discharge  -CZ     Strategies/Barriers (ROM Goal 1, PT)  L BKA.  -CZ     Progress/Outcome (ROM Goal 1, PT)  goal not met  -CZ       User Key  (r) = Recorded By, (t) = Taken By, (c) = Cosigned By    Initials Name Provider Type    CZ Carlito Montoya, PT Physical Therapist        Clinical Impression     Row Name 07/07/21 1120          Pain    Additional Documentation  Pain Scale: Numbers Pre/Post-Treatment (Group)  -CZ     Row Name 07/07/21 1120          Pain Scale: Numbers Pre/Post-Treatment    Pretreatment Pain Rating  5/10  -CZ     Posttreatment Pain Rating  4/10  -CZ     Pain Location  abdomen  -CZ      Pre/Posttreatment Pain Comment  Ace wrap present on L residual limb, wrapped circumfrentially. Prosthetic  over ace wrap.  Both removed, ace wrap re-applied in figure 8 pattern to desensitize, shape and reduce edema. Denies L BKA pain.  -     Pain Intervention(s)  Repositioned;Ambulation/increased activity;Distraction  -     Row Name 07/07/21 1120          Plan of Care Review    Plan of Care Reviewed With  patient  -     Outcome Summary  Initial PT evaluation complete.  Patient is alert and cooperative.  She requires min Ax1 with bed moility, transfers and gait.  She is able to take two brief steps (hops) forward/back and sidestepping R to HOB; very poor ability to hop on RLE.  Patient already has FWW and w/c at home, was active with HHPT prior to admission and will benefit from continuing with HHPT upon discharge.  L residual limb ace-wrap removed, reapplied with figure-8 pattern for edema control, shaping and desensitization.  Patient would benefit from re-wrapping with new ace-wrap every shift as patient reports she will have to go to [place] to be fit for a  sock; nurse notified.  Goals established, continue skilled PT.  -     Row Name 07/07/21 1120          Therapy Assessment/Plan (PT)    Rehab Potential (PT)  good, to achieve stated therapy goals  -     Criteria for Skilled Interventions Met (PT)  yes;skilled treatment is necessary  -     Predicted Duration of Therapy Intervention (PT)  Was at [facility] x 16 days, home x 1 week, with HHPT, then admitted here.  -     Row Name 07/07/21 1120          Vital Signs    Pre Systolic BP Rehab  170  -CZ     Pre Treatment Diastolic BP  76  -CZ     Post Systolic BP Rehab  165  -CZ     Post Treatment Diastolic BP  74  -CZ     Pretreatment Heart Rate (beats/min)  83  -CZ     Posttreatment Heart Rate (beats/min)  70  -CZ     Pre SpO2 (%)  95  -CZ     O2 Delivery Pre Treatment  room air  -CZ     Post SpO2 (%)  96  -CZ     O2 Delivery Post  Treatment  room air  -CZ     Pre Patient Position  Supine  -CZ     Post Patient Position  Supine  -CZ     Row Name 07/07/21 1120          Positioning and Restraints    Pre-Treatment Position  in bed  -CZ     Post Treatment Position  bed  -CZ     In Bed  supine;call light within reach;encouraged to call for assist;exit alarm on  -CZ       User Key  (r) = Recorded By, (t) = Taken By, (c) = Cosigned By    Initials Name Provider Type    Carlito Bryan, PT Physical Therapist        Outcome Measures     Row Name 07/07/21 1120          How much help from another person do you currently need...    Turning from your back to your side while in flat bed without using bedrails?  3  -CZ     Moving from lying on back to sitting on the side of a flat bed without bedrails?  3  -CZ     Moving to and from a bed to a chair (including a wheelchair)?  3  -CZ     Standing up from a chair using your arms (e.g., wheelchair, bedside chair)?  3  -CZ     Climbing 3-5 steps with a railing?  1  -CZ     To walk in hospital room?  3  -CZ     AM-PAC 6 Clicks Score (PT)  16  -CZ     Row Name 07/07/21 1120          Functional Assessment    Outcome Measure Options  AM-PAC 6 Clicks Basic Mobility (PT)  -CZ       User Key  (r) = Recorded By, (t) = Taken By, (c) = Cosigned By    Initials Name Provider Type    Carlito Bryan, PT Physical Therapist        Physical Therapy Education                 Title: PT OT SLP Therapies (In Progress)     Topic: Physical Therapy (In Progress)     Point: Mobility training (Done)     Learning Progress Summary           Patient Acceptance, E, VU,NR by RAYMOND at 7/7/2021 0089    Comment: Educated on proper acewrapping technique for L BKA, proper use of walker, proper hand placement with transfers.                   Point: Home exercise program (Not Started)     Learner Progress:  Not documented in this visit.          Point: Body mechanics (Not Started)     Learner Progress:  Not documented in this visit.           Point: Precautions (Not Started)     Learner Progress:  Not documented in this visit.                      User Key     Initials Effective Dates Name Provider Type Discipline     06/16/21 -  Carlito Montoya, PT Physical Therapist PT              PT Recommendation and Plan  Planned Therapy Interventions (PT): balance training, bed mobility training, gait training, patient/family education, transfer training, strengthening, stretching, ROM (range of motion), prosthetic fitting/training  Plan of Care Reviewed With: patient  Outcome Summary: Initial PT evaluation complete.  Patient is alert and cooperative.  She requires min Ax1 with bed moility, transfers and gait.  She is able to take two brief steps (hops) forward/back and sidestepping R to HOB; very poor ability to hop on RLE.  Patient already has FWW and w/c at home, was active with HHPT prior to admission and will benefit from continuing with HHPT upon discharge.  L residual limb ace-wrap removed, reapplied with figure-8 pattern for edema control, shaping and desensitization.  Patient would benefit from re-wrapping with new ace-wrap every shift as patient reports she will have to go to [place] to be fit for a  sock; nurse notified.  Goals established, continue skilled PT.     Time Calculation:   PT Charges     Row Name 07/07/21 1423             Time Calculation    Start Time  1120  -CZ      Stop Time  1200  -CZ      Time Calculation (min)  40 min  -CZ      PT Received On  07/07/21  -CZ      PT Goal Re-Cert Due Date  07/20/21  -CZ         Untimed Charges    PT Eval/Re-eval Minutes  40  -CZ         Total Minutes    Untimed Charges Total Minutes  40  -CZ       Total Minutes  40  -CZ        User Key  (r) = Recorded By, (t) = Taken By, (c) = Cosigned By    Initials Name Provider Type    CZ Carlito Montoya, PT Physical Therapist        Therapy Charges for Today     Code Description Service Date Service Provider Modifiers Qty    [id number] HC PT EVAL MOD  COMPLEXITY 3 7/7/2021 Carlito Montoya, PT GP 1          PT G-Codes  Outcome Measure Options: AM-PAC 6 Clicks Basic Mobility (PT)  AM-PAC 6 Clicks Score (PT): 16    Carlito Montoya, PT  7/7/2021

## 2021-07-07 NOTE — PLAN OF CARE
Goal Outcome Evaluation:  Plan of Care Reviewed With: caregiver, patient        Progress: no change  Outcome Summary: Pt had diet downgraded after one meal due to abd pain and nausea.  She is tolerating the clear liquids.  Noted preferences.  Monitor.

## 2021-07-07 NOTE — PLAN OF CARE
Goal Outcome Evaluation:  Plan of Care Reviewed With: patient        Progress: improving  Outcome Summary: VVS. PRN pain medication used as needed. No s/s of distress noted. Will continue to monitor.

## 2021-07-07 NOTE — PROGRESS NOTES
[facility] Medicine Services  INPATIENT PROGRESS NOTE    Length of Stay: 5  Date of Admission: 7/2/2021  Primary Care Physician: Marty, BEBE Luu    Subjective   Chief Complaint: Abdominal pain  HPI: Patient states she has had worsening abdominal pain after eating.    Review of Systems   Constitutional: Negative for appetite change, chills, fatigue, fever and unexpected weight change.   Respiratory: Negative for cough, choking, chest tightness, shortness of breath and wheezing.    Cardiovascular: Negative for chest pain, palpitations and leg swelling.   Gastrointestinal: Positive for abdominal pain. Negative for blood in stool, constipation, diarrhea, nausea and vomiting.   Genitourinary: Negative for dysuria, flank pain and hematuria.   Neurological: Negative for dizziness, seizures, syncope, speech difficulty, weakness, light-headedness, numbness and headaches.   Hematological: Does not bruise/bleed easily.        All pertinent negatives and positives are as above. All other systems have been reviewed and are negative unless otherwise stated.     Objective    Temp:  [96.8 °F (36 °C)-98 °F (36.7 °C)] 97.3 °F (36.3 °C)  Heart Rate:  [60-78] 68  Resp:  [18] 18  BP: (109-193)/(53-84) 167/77    Physical Exam  Vitals reviewed.   Constitutional:       Appearance: She is well-developed.   HENT:      Head: Normocephalic and atraumatic.   Eyes:      Pupils: Pupils are equal, round, and reactive to light.   Cardiovascular:      Rate and Rhythm: Normal rate and regular rhythm.      Heart sounds: Normal heart sounds. No murmur heard.   No friction rub. No gallop.    Pulmonary:      Effort: Pulmonary effort is normal. No respiratory distress.      Breath sounds: Normal breath sounds. No wheezing or rales.   Chest:      Chest wall: No tenderness.   Abdominal:      General: Bowel sounds are normal. There is no distension.      Palpations: Abdomen is soft.      Tenderness: There is  generalized abdominal tenderness. There is no guarding.   Musculoskeletal:      Cervical back: Normal range of motion and neck supple.      Comments: Left BKA dressing clean dry and intact   Skin:     General: Skin is warm and dry.   Psychiatric:         Behavior: Behavior normal.         Thought Content: Thought content normal.         Results Review:  I have reviewed the labs, radiology results, and diagnostic studies.    Laboratory Data:   Results from last 7 days   Lab Units 07/07/21  0651 07/06/21  0626 07/05/21  0412 07/04/21  0845 07/03/21  0336 07/02/21  2202   SODIUM mmol/L 142 138 139 139 131* 133*   POTASSIUM mmol/L 3.4* 3.4* 4.2 3.9 4.4 4.0   CHLORIDE mmol/L 107 103 104 104 93* 95*   CO2 mmol/L 28.0 26.0 29.0 27.0 21.0* 25.0   BUN mg/dL 3* 4* 10 22* 34* 32*   CREATININE mg/dL 0.71 0.80 1.02* 1.32* 2.11* 2.00*   GLUCOSE mg/dL 91 173* 189* 161* 392* 241*   CALCIUM mg/dL 9.3 9.2 9.4 9.7 9.3 9.3   BILIRUBIN mg/dL  --   --   --  0.3 0.4 0.3   ALK PHOS U/L  --   --   --  103 139* 129*   ALT (SGPT) U/L  --   --   --  12 18 19   AST (SGOT) U/L  --   --   --  14 16 18   ANION GAP mmol/L 7.0 9.0 6.0 8.0 17.0* 13.0     Estimated Creatinine Clearance: 111.9 mL/min (by C-G formula based on SCr of 0.71 mg/dL).  Results from last 7 days   Lab Units 07/04/21  0845 07/03/21  0336   MAGNESIUM mg/dL 1.7 1.6         Results from last 7 days   Lab Units 07/07/21  0651 07/06/21  0645 07/05/21  0412 07/04/21  0845 07/03/21  0619   WBC 10*3/mm3 7.06 9.19 11.78* 13.20* 28.39*   HEMOGLOBIN g/dL 10.2* 10.1* 11.0* 10.6* 12.5   HEMATOCRIT % 31.1* 31.2* 34.2 32.4* 37.8   PLATELETS 10*3/mm3 325 328 323 330 447           Culture Data:   No results found for: BLOODCX  No results found for: URINECX  No results found for: RESPCX  No results found for: WOUNDCX  No results found for: STOOLCX  No components found for: BODYFLD    Radiology Data:   Imaging Results (Last 24 Hours)     ** No results found for the last 24 hours. **          I  have reviewed the patient's current medications.     Assessment/Plan     Active Hospital Problems    Diagnosis    • Acute colitis        Plan:  1.  Acute colitis: Continue IV fluids and antibiotics.  Will change diet to clear liquids for now.  2.  Septic shock: Resolved.  3.  Acute kidney injury: Resolved.  4.  Diabetes mellitus: Continue current treatment.  Glucose less than 200 for the most part.  5.  Left BKA: Staples removed today.  6.  Hypertension: We will restart home lisinopril.  7.  Deconditioning: PT/OT.  8.  DVT prophylaxis: Lovenox.        The patient was evaluated during the global COVID-19 pandemic, and the diagnosis was suspected/considered upon their initial presentation.  Evaluation, treatment, and testing were consistent with current guidelines for patients who present with complaints or symptoms that may be related to COVID-19.    I confirmed that the patient's Advance Care Plan is present, code status is documented, or surrogate decision maker is listed in the patient's medical record.       Discharge Planning: I expect patient to be discharged to home in 2-3 days.        This document has been electronically signed by Mahin Esteves MD on July 7, 2021 10:47 CDT

## 2021-07-07 NOTE — PLAN OF CARE
Goal Outcome Evaluation:  Plan of Care Reviewed With: patient           Problem: Adult Inpatient Plan of Care  Goal: Plan of Care Review  Recent Flowsheet Documentation  Taken 7/7/2021 1120 by Carlito Montoya, PT  Plan of Care Reviewed With: patient  Outcome Summary:  Initial PT evaluation complete.  Patient is alert and cooperative.  She requires min Ax1 with bed moility, transfers and gait.  She is able to take two brief steps (hops) forward/back and sidestepping R to HOB; very poor ability to hop on RLE.  Patient already has FWW and w/c at home, was active with HHPT prior to admission and will benefit from continuing with HHPT upon discharge.  L residual limb ace-wrap removed, reapplied with figure-8 pattern for edema control, shaping and desensitization.  Patient would benefit from re-wrapping with new ace-wrap every shift as patient reports she will have to go to [facility] to be fit for a  sock; nurse notified.  Goals established, continue skilled PT.

## 2021-07-08 LAB
ANION GAP SERPL CALCULATED.3IONS-SCNC: 8 MMOL/L (ref 5–15)
BASOPHILS # BLD AUTO: 0.03 10*3/MM3 (ref 0–0.2)
BASOPHILS NFR BLD AUTO: 0.3 % (ref 0–1.5)
BUN SERPL-MCNC: 3 MG/DL (ref 6–20)
BUN/CREAT SERPL: 4.3 (ref 7–25)
CALCIUM SPEC-SCNC: 9.4 MG/DL (ref 8.6–10.5)
CHLORIDE SERPL-SCNC: 107 MMOL/L (ref 98–107)
CO2 SERPL-SCNC: 28 MMOL/L (ref 22–29)
CREAT SERPL-MCNC: 0.7 MG/DL (ref 0.57–1)
DEPRECATED RDW RBC AUTO: 43.6 FL (ref 37–54)
EOSINOPHIL # BLD AUTO: 0.48 10*3/MM3 (ref 0–0.4)
EOSINOPHIL NFR BLD AUTO: 5.5 % (ref 0.3–6.2)
ERYTHROCYTE [DISTWIDTH] IN BLOOD BY AUTOMATED COUNT: 14.2 % (ref 12.3–15.4)
GFR SERPL CREATININE-BSD FRML MDRD: 86 ML/MIN/1.73
GLUCOSE BLDC GLUCOMTR-MCNC: 123 MG/DL (ref 70–130)
GLUCOSE BLDC GLUCOMTR-MCNC: 172 MG/DL (ref 70–130)
GLUCOSE BLDC GLUCOMTR-MCNC: 183 MG/DL (ref 70–130)
GLUCOSE SERPL-MCNC: 107 MG/DL (ref 65–99)
HCT VFR BLD AUTO: 31.8 % (ref 34–46.6)
HGB BLD-MCNC: 10.4 G/DL (ref 12–15.9)
IMM GRANULOCYTES # BLD AUTO: 0.02 10*3/MM3 (ref 0–0.05)
IMM GRANULOCYTES NFR BLD AUTO: 0.2 % (ref 0–0.5)
LYMPHOCYTES # BLD AUTO: 2.47 10*3/MM3 (ref 0.7–3.1)
LYMPHOCYTES NFR BLD AUTO: 28.5 % (ref 19.6–45.3)
MCH RBC QN AUTO: 28.1 PG (ref 26.6–33)
MCHC RBC AUTO-ENTMCNC: 32.7 G/DL (ref 31.5–35.7)
MCV RBC AUTO: 85.9 FL (ref 79–97)
MONOCYTES # BLD AUTO: 0.54 10*3/MM3 (ref 0.1–0.9)
MONOCYTES NFR BLD AUTO: 6.2 % (ref 5–12)
NEUTROPHILS NFR BLD AUTO: 5.14 10*3/MM3 (ref 1.7–7)
NEUTROPHILS NFR BLD AUTO: 59.3 % (ref 42.7–76)
NRBC BLD AUTO-RTO: 0 /100 WBC (ref 0–0.2)
PLATELET # BLD AUTO: 363 10*3/MM3 (ref 140–450)
PMV BLD AUTO: 9.4 FL (ref 6–12)
POTASSIUM SERPL-SCNC: 3.5 MMOL/L (ref 3.5–5.2)
RBC # BLD AUTO: 3.7 10*6/MM3 (ref 3.77–5.28)
SODIUM SERPL-SCNC: 143 MMOL/L (ref 136–145)
WBC # BLD AUTO: 8.68 10*3/MM3 (ref 3.4–10.8)

## 2021-07-08 PROCEDURE — 25010000002 PIPERACILLIN SOD-TAZOBACTAM PER 1 G: Performed by: INTERNAL MEDICINE

## 2021-07-08 PROCEDURE — 85025 COMPLETE CBC W/AUTO DIFF WBC: CPT | Performed by: INTERNAL MEDICINE

## 2021-07-08 PROCEDURE — 25010000002 ONDANSETRON PER 1 MG: Performed by: INTERNAL MEDICINE

## 2021-07-08 PROCEDURE — 97530 THERAPEUTIC ACTIVITIES: CPT

## 2021-07-08 PROCEDURE — 25010000002 PROCHLORPERAZINE 10 MG/2ML SOLUTION: Performed by: INTERNAL MEDICINE

## 2021-07-08 PROCEDURE — 80048 BASIC METABOLIC PNL TOTAL CA: CPT | Performed by: INTERNAL MEDICINE

## 2021-07-08 PROCEDURE — 25010000002 HYDROMORPHONE 1 MG/ML SOLUTION: Performed by: INTERNAL MEDICINE

## 2021-07-08 PROCEDURE — 63710000001 INSULIN DETEMIR PER 5 UNITS: Performed by: INTERNAL MEDICINE

## 2021-07-08 PROCEDURE — 63710000001 INSULIN ASPART PER 5 UNITS: Performed by: HOSPITALIST

## 2021-07-08 PROCEDURE — 82962 GLUCOSE BLOOD TEST: CPT

## 2021-07-08 PROCEDURE — 25010000002 ENOXAPARIN PER 10 MG: Performed by: INTERNAL MEDICINE

## 2021-07-08 PROCEDURE — 97166 OT EVAL MOD COMPLEX 45 MIN: CPT

## 2021-07-08 RX ADMIN — ATORVASTATIN CALCIUM 80 MG: 40 TABLET, FILM COATED ORAL at 08:37

## 2021-07-08 RX ADMIN — VENLAFAXINE HYDROCHLORIDE 150 MG: 75 CAPSULE, EXTENDED RELEASE ORAL at 08:37

## 2021-07-08 RX ADMIN — PROCHLORPERAZINE EDISYLATE 5 MG: 5 INJECTION INTRAMUSCULAR; INTRAVENOUS at 06:10

## 2021-07-08 RX ADMIN — HYDROMORPHONE HYDROCHLORIDE 0.5 MG: 1 INJECTION, SOLUTION INTRAMUSCULAR; INTRAVENOUS; SUBCUTANEOUS at 18:35

## 2021-07-08 RX ADMIN — SODIUM CHLORIDE, PRESERVATIVE FREE 10 ML: 5 INJECTION INTRAVENOUS at 08:37

## 2021-07-08 RX ADMIN — SODIUM CHLORIDE, PRESERVATIVE FREE 10 ML: 5 INJECTION INTRAVENOUS at 08:46

## 2021-07-08 RX ADMIN — DOCOSANOL: 100 CREAM TOPICAL at 18:37

## 2021-07-08 RX ADMIN — GABAPENTIN 300 MG: 300 CAPSULE ORAL at 13:18

## 2021-07-08 RX ADMIN — ENOXAPARIN SODIUM 40 MG: 40 INJECTION SUBCUTANEOUS at 08:37

## 2021-07-08 RX ADMIN — DOCOSANOL: 100 CREAM TOPICAL at 21:00

## 2021-07-08 RX ADMIN — PIPERACILLIN SODIUM AND TAZOBACTAM SODIUM 3.38 G: 3; .375 INJECTION, POWDER, LYOPHILIZED, FOR SOLUTION INTRAVENOUS at 23:39

## 2021-07-08 RX ADMIN — SODIUM CHLORIDE, POTASSIUM CHLORIDE, SODIUM LACTATE AND CALCIUM CHLORIDE 100 ML/HR: 600; 310; 30; 20 INJECTION, SOLUTION INTRAVENOUS at 01:48

## 2021-07-08 RX ADMIN — INSULIN ASPART 3 UNITS: 100 INJECTION, SOLUTION INTRAVENOUS; SUBCUTANEOUS at 11:29

## 2021-07-08 RX ADMIN — FAMOTIDINE 20 MG: 10 INJECTION INTRAVENOUS at 08:37

## 2021-07-08 RX ADMIN — SODIUM CHLORIDE, PRESERVATIVE FREE 10 ML: 5 INJECTION INTRAVENOUS at 21:00

## 2021-07-08 RX ADMIN — LORAZEPAM 0.5 MG: 0.5 TABLET ORAL at 12:21

## 2021-07-08 RX ADMIN — DOCOSANOL: 100 CREAM TOPICAL at 11:29

## 2021-07-08 RX ADMIN — CLOPIDOGREL BISULFATE 75 MG: 75 TABLET ORAL at 08:37

## 2021-07-08 RX ADMIN — TAMSULOSIN HYDROCHLORIDE 0.4 MG: 0.4 CAPSULE ORAL at 08:37

## 2021-07-08 RX ADMIN — PIPERACILLIN SODIUM AND TAZOBACTAM SODIUM 3.38 G: 3; .375 INJECTION, POWDER, LYOPHILIZED, FOR SOLUTION INTRAVENOUS at 14:12

## 2021-07-08 RX ADMIN — ARIPIPRAZOLE 15 MG: 15 TABLET ORAL at 08:37

## 2021-07-08 RX ADMIN — ASPIRIN 81 MG: 81 TABLET, FILM COATED ORAL at 08:36

## 2021-07-08 RX ADMIN — HYDROMORPHONE HYDROCHLORIDE 0.5 MG: 1 INJECTION, SOLUTION INTRAMUSCULAR; INTRAVENOUS; SUBCUTANEOUS at 02:51

## 2021-07-08 RX ADMIN — HYDROMORPHONE HYDROCHLORIDE 0.5 MG: 1 INJECTION, SOLUTION INTRAMUSCULAR; INTRAVENOUS; SUBCUTANEOUS at 13:19

## 2021-07-08 RX ADMIN — LISINOPRIL 20 MG: 20 TABLET ORAL at 08:37

## 2021-07-08 RX ADMIN — ONDANSETRON HYDROCHLORIDE 4 MG: 2 INJECTION, SOLUTION INTRAMUSCULAR; INTRAVENOUS at 11:32

## 2021-07-08 RX ADMIN — ONDANSETRON HYDROCHLORIDE 4 MG: 2 INJECTION, SOLUTION INTRAMUSCULAR; INTRAVENOUS at 01:09

## 2021-07-08 RX ADMIN — SODIUM CHLORIDE, POTASSIUM CHLORIDE, SODIUM LACTATE AND CALCIUM CHLORIDE 100 ML/HR: 600; 310; 30; 20 INJECTION, SOLUTION INTRAVENOUS at 11:29

## 2021-07-08 RX ADMIN — GABAPENTIN 300 MG: 300 CAPSULE ORAL at 21:00

## 2021-07-08 RX ADMIN — DOCOSANOL 1 APPLICATION: 100 CREAM TOPICAL at 06:09

## 2021-07-08 RX ADMIN — INSULIN ASPART 3 UNITS: 100 INJECTION, SOLUTION INTRAVENOUS; SUBCUTANEOUS at 16:40

## 2021-07-08 RX ADMIN — SODIUM CHLORIDE, POTASSIUM CHLORIDE, SODIUM LACTATE AND CALCIUM CHLORIDE 100 ML/HR: 600; 310; 30; 20 INJECTION, SOLUTION INTRAVENOUS at 20:59

## 2021-07-08 RX ADMIN — HYDROMORPHONE HYDROCHLORIDE 0.5 MG: 1 INJECTION, SOLUTION INTRAMUSCULAR; INTRAVENOUS; SUBCUTANEOUS at 07:45

## 2021-07-08 RX ADMIN — FOLIC ACID 1 MG: 1 TABLET ORAL at 08:37

## 2021-07-08 RX ADMIN — SODIUM CHLORIDE, PRESERVATIVE FREE 10 ML: 5 INJECTION INTRAVENOUS at 20:59

## 2021-07-08 RX ADMIN — INSULIN DETEMIR 40 UNITS: 100 INJECTION, SOLUTION SUBCUTANEOUS at 21:06

## 2021-07-08 RX ADMIN — PIPERACILLIN SODIUM AND TAZOBACTAM SODIUM 3.38 G: 3; .375 INJECTION, POWDER, LYOPHILIZED, FOR SOLUTION INTRAVENOUS at 06:51

## 2021-07-08 RX ADMIN — DOCOSANOL: 100 CREAM TOPICAL at 13:19

## 2021-07-08 NOTE — PLAN OF CARE
Goal Outcome Evaluation:  Plan of Care Reviewed With: patient        Progress: improving  Outcome Summary: VVS. Pt had increased nausea and an episode of vomiting after eating jello and drinking. Nausea medication given throughout night. PRN pain medication given for abdominal pain. No s/s of distress noted at this time. Will continue to monitor.

## 2021-07-08 NOTE — PLAN OF CARE
Goal Outcome Evaluation:  Plan of Care Reviewed With: patient           Outcome Summary: OT eval on this date.  Pt required CGA for bed mobility and min A for sit to stand to sit.  Pt able to step to head of bed with min/mod A.  She was able to doff/don sock with CGA for sitting balance.  Pt could benefit from OT services to increase safety and independence with ADLs and functional mobility.  Rec home with cont therapy and assist.when D/C from hospital.

## 2021-07-08 NOTE — PLAN OF CARE
Goal Outcome Evaluation:   Pt doesn't seem to be tolerating a diet well.she vomited 500cc overnight and felt nauseated all day today.vss.pt worked with therapy.will cont to monitor

## 2021-07-08 NOTE — THERAPY EVALUATION
Acute Care - Occupational Therapy Initial Evaluation  [facility]     Patient Name: Ariana Martinez  : 1960  MRN: [id number]  Today's Date: 2021     Date of Referral to OT: 21       Admit Date: 2021       ICD-10-CM ICD-9-CM   1. Acute colitis  K52.9 558.9   2. Acute kidney injury (CMS/HCC)  N17.9 584.9   3. Impaired physical mobility  Z74.09 781.99   4. Impaired mobility and activities of daily living  Z74.09 V49.89    Z78.9      Patient Active Problem List   Diagnosis   • 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, with long-term current use of insulin (CMS/HCC)   • Closed nondisplaced fracture of fifth left metatarsal bone   • MAURICIO (generalized anxiety disorder)   • Depression, major, recurrent, moderate (CMS/HCC)   • GERD without esophagitis   • Long term prescription opiate use   • Mixed hyperlipidemia   • Vitamin D deficiency   • Seasonal allergic rhinitis   • Restrictive lung disease secondary to obesity   • Adult body mass index 37.0-37.9   • Snoring   • Class 3 severe obesity due to excess calories without serious comorbidity with body mass index (BMI) of 40.0 to 44.9 in adult (CMS/HCC)   • (HFpEF) heart failure with preserved ejection fraction (CMS/HCC)   • Type 2 diabetes mellitus with diabetic polyneuropathy (CMS/HCC)   • Cyanocobalamin deficiency   • CAD (coronary artery disease)   • Hypertension   • Meniere's disease   • Gastroparesis   • Otitis media with effusion, left   • Pulmonary hypertension (CMS/HCC)   • Displaced fracture of fifth metatarsal bone, left foot, subsequent encounter for fracture with nonunion   • Pes cavus   • Primary osteoarthritis involving multiple joints   • Generalized anxiety disorder   • Chronic right-sided low back pain with right-sided sciatica   • Chest pain   • Thrombocytosis (CMS/HCC)   • Leukocytosis   • Iron deficiency   • Adverse effect of iron   • Hindfoot varus, acquired, left   • Chest pain due to myocardial  ischemia   • Nonrheumatic tricuspid valve regurgitation   • Iron deficiency anemia due to chronic blood loss   • Malaise and fatigue   • Nonunion of subtalar arthrodesis   • Ankle arthritis   • Cellulitis of left lower extremity   • Urinary retention   • Acute bacterial endocarditis   • Staphylococcus aureus bacteremia   • Infection due to Acinetobacter species   • Endocarditis   • Left foot infection   • Uncontrolled hypertension   • Occlusion and stenosis of bilateral carotid arteries   • S/P BKA (below knee amputation) unilateral, left (CMS/HCC)   • Phantom pain after amputation of lower extremity (CMS/[facility])   • S/P CABG (coronary artery bypass graft)   • Acute colitis     Past Medical History:   Diagnosis Date   • Angina, class IV (CMS/[facility])    • Anxiety    • Anxiety and depression    • Arthritis    • Benign paroxysmal positional vertigo    • Bladder disorder     has bladder stimulator   • Carpal tunnel syndrome    • CHF (congestive heart failure) (CMS/HCC)    • Chronic pain    • Coronary atherosclerosis     hx CABG 2005.  is followed by Dr Kwon   • Depression    • Diabetes mellitus (CMS/HCC)     Type 2, controlled   • Diabetic polyneuropathy (CMS/HCC)    • Disease of thyroid gland    • Elevated cholesterol    • Female stress incontinence    • Foot pain, left    • Full dentures    • Gastroparesis    • GERD (gastroesophageal reflux disease)    • Hyperlipidemia    • Hypertension    • Low back pain    • Malaise and fatigue    • Multiple joint pain    • Obesity     Refuses to be weighed   • Occlusion and stenosis of bilateral carotid arteries 6/18/2021   • Otalgia     Both   • Perforation of tympanic membrane     Left   • Postoperative wound infection    • Vitamin D deficiency    • Wears glasses     reading     Past Surgical History:   Procedure Laterality Date   • ABDOMINAL SURGERY     • AMPUTATION FOOT     • ANGIOPLASTY      coronary   • ANKLE ARTHROSCOPY Left 2/26/2021    Procedure: Left foot hardware removal,  ankle arthroscopy, bone grafting , left foot exostectomy;  Surgeon: Ignacio Lord DPM;  Location: [facility] OR;  Service: Podiatry;  Laterality: Left;   • BREAST BIOPSY Right    • CARDIAC CATHETERIZATION     • CARDIAC CATHETERIZATION N/A 6/20/2017    Procedure: Right Heart Cath;  Surgeon: Can Kwon MD PhD;  Location: [facility] CATH INVASIVE LOCATION;  Service:    • CARDIAC CATHETERIZATION N/A 2/18/2020    Procedure: Left Heart Cath;  Surgeon: Catalina Cooper MD;  Location: [facility] CATH INVASIVE LOCATION;  Service: Cardiology;  Laterality: N/A;   • CARPAL TUNNEL RELEASE     • CHOLECYSTECTOMY     • COLONOSCOPY N/A 6/24/2020    Procedure: COLONOSCOPY;  Surgeon: Julián Maldonado MD;  Location: [facility] ENDOSCOPY;  Service: Gastroenterology;  Laterality: N/A;   • CORONARY ARTERY BYPASS GRAFT  2005   • ENDOSCOPY N/A 10/19/2018    Procedure: ESOPHAGOGASTRODUODENOSCOPY possible dilation;  Surgeon: Julián Maldonado MD;  Location: [facility] ENDOSCOPY;  Service: Gastroenterology   • ENDOSCOPY N/A 6/24/2020    Procedure: ESOPHAGOGASTRODUODENOSCOPY WED appt please;  Surgeon: Julián Maldonado MD;  Location: [facility] ENDOSCOPY;  Service: Gastroenterology;  Laterality: N/A;   • ENDOSCOPY AND COLONOSCOPY     • FOOT SURGERY      Toes   • FOOT SURGERY     • GASTRIC BANDING      Revision, laparoscopic   • HYSTERECTOMY     • INCISION AND DRAINAGE LEG Left 3/12/2021    Procedure: Left ankle arthroscopic irrigation and debridement, screw removal;  Surgeon: Ignacio Lord DPM;  Location: [facility] OR;  Service: Podiatry;  Laterality: Left;   • MOUTH SURGERY     • SALPINGO OOPHORECTOMY     • SHOULDER SURGERY     • SUBTALAR ARTHRODESIS Left 1/16/2019    Procedure: LEFT FOOT HARDWARE REMOVAL, FIFTH METATARSAL , OPEN REDUCTION INTERNAL FIXATION, CALCANEAL OSTEOTOMY;  Surgeon: Ignacio Lord DPM;  Location: [facility] OR;  Service: Podiatry   • SUBTALAR ARTHRODESIS Left 10/16/2019    Procedure: foot hardware removal, subtalar joint  fusion  possible de/reattachment of achilles tendon        (c-arm);  Surgeon: Ignacio Lord DPM;  Location: [facility] OR;  Service: Podiatry   • SUBTALAR ARTHRODESIS Left 9/30/2020    Procedure: subtalar, talonavicular joint arthrodesis.  Removal hardware.          (c-arm);  Surgeon: Ignacio Lord DPM;  Location: [facility];  Service: Podiatry;  Laterality: Left;   • TRANSESOPHAGEAL ECHOCARDIOGRAM (LAMONTE)      With color flow            OT ASSESSMENT FLOWSHEET (last 12 hours)      OT Evaluation and Treatment     Row Name 07/08/21 0855                   OT Time and Intention    Subjective Information  no complaints  -RB        Document Type  evaluation  -RB        Mode of Treatment  individual therapy;occupational therapy  -RB        Patient Effort  good  -RB           General Information    Patient Profile Reviewed  yes  -RB        Prior Level of Function  independent:;transfer;w/c or scooter;bed mobility;min assist:;gait;ADL's;dependent:;driving;home management  -RB        Existing Precautions/Restrictions  fall  -RB        Barriers to Rehab  previous functional deficit  -RB           Cognition    Orientation Status (Cognition)  oriented x 4  -RB           Pain Assessment    Additional Documentation  Pain Scale: Numbers Pre/Post-Treatment (Group)  -RB           Pain Scale: Numbers Pre/Post-Treatment    Pretreatment Pain Rating  3/10  -RB        Posttreatment Pain Rating  3/10  -RB        Pain Location - Orientation  lower  -RB        Pain Location  abdomen  -RB        Pain Intervention(s)  Medication (See MAR)  -RB           Range of Motion Comprehensive    General Range of Motion  no range of motion deficits identified;bilateral upper extremity ROM WNL  -RB           Strength Comprehensive (MMT)    General Manual Muscle Testing (MMT) Assessment  other (see comments)  -RB        Comment, General Manual Muscle Testing (MMT) Assessment  B UE strength was 4 to 4+/5 grossly  -RB           Mobility    Extremity  Weight-bearing Status  left lower extremity  -RB        Left Lower Extremity (Weight-bearing Status)  -- Recent BKA  -RB           Bed Mobility    Bed Mobility  supine-sit;sit-supine  -RB        Supine-Sit [place] (Bed Mobility)  contact guard  -RB        Sit-Supine [place] (Bed Mobility)  contact guard  -RB        Assistive Device (Bed Mobility)  head of bed elevated;bed rails  -RB           Functional Mobility    Functional Mobility- Ind. Level  minimum assist (75% patient effort)  -RB        Functional Mobility- Device  rolling walker  -RB        Functional Mobility-Distance (Feet)  1  -RB        Functional Mobility- Safety Issues  step length decreased  -RB           Transfers    Sit-Stand [place] (Transfers)  minimum assist (75% patient effort)  -RB           Sit-Stand Transfer    Assistive Device (Sit-Stand Transfers)  walker, front-wheeled  -RB           Safety Issues, Functional Mobility    Impairments Affecting Function (Mobility)  strength;endurance/activity tolerance;balance  -RB           Activities of Daily Living    BADL Assessment/Intervention  lower body dressing  -RB           Lower Body Dressing Assessment/Training    [place] Level (Lower Body Dressing)  lower body dressing skills;doff;don;socks;contact guard assist  -RB        Position (Lower Body Dressing)  edge of bed sitting  -RB           Wound 07/02/21 0045 Left distal leg Incision    Wound - Properties Group Placement Date: 07/02/21 -TH Placement Time: 0045 -TH Present on Hospital Admission: Y  -TH Side: Left  -TH Orientation: distal  -TH Location: leg  -TH Primary Wound Type: Incision  -TH, staples     Retired Wound - Properties Group Date first assessed: 07/02/21 -TH Time first assessed: 0045 -TH Present on Hospital Admission: Y  -TH Side: Left  -TH Location: leg  -TH Primary Wound Type: Incision  -TH, staples        Plan of Care Review    Plan of Care Reviewed With  patient  -RB           Vital Signs    Pre  Systolic BP Rehab  148  -RB        Pre Treatment Diastolic BP  67  -RB        Post Systolic BP Rehab  120  -RB        Post Treatment Diastolic BP  80  -RB        Pretreatment Heart Rate (beats/min)  76  -RB        Posttreatment Heart Rate (beats/min)  72  -RB        Pre SpO2 (%)  96  -RB        O2 Delivery Pre Treatment  room air  -RB        Post SpO2 (%)  95  -RB        O2 Delivery Post Treatment  room air  -RB        Pre Patient Position  Supine  -RB        Intra Patient Position  Standing  -RB        Post Patient Position  Supine  -RB           Positioning and Restraints    Pre-Treatment Position  in bed  -RB        Post Treatment Position  bed  -RB        In Bed  supine;call light within reach;encouraged to call for assist;exit alarm on  -RB           Therapy Assessment/Plan (OT)    Date of Referral to OT  07/07/21  -RB        Functional Level at Time of Evaluation (OT)  Imp mob and ADLs.  -RB        OT Diagnosis  Imp mob and ADLs.  -RB        Rehab Potential (OT)  fair, will monitor progress closely  -RB        Criteria for Skilled Therapeutic Interventions Met (OT)  yes;meets criteria  -RB        Therapy Frequency (OT)  other (see comments) 5-7 days/wk.  -RB        Predicted Duration of Therapy Intervention (OT)  Until D/C or goals met.  -RB        Problem List (OT)  problems related to;balance;coordination;mobility;strength;inability to direct their own care  -RB        Activity Limitations Related to Problem List (OT)  unable to ambulate safely;unable to transfer safely;BADLs not performed adequately or safely;IADLs not performed adequately or safely  -RB        Planned Therapy Interventions (OT)  activity tolerance training;adaptive equipment training;BADL retraining;functional balance retraining;occupation/activity based interventions;patient/caregiver education/training;ROM/therapeutic exercise;strengthening exercise;transfer/mobility retraining  -RB           Evaluation Complexity (OT)    Review  Occupational Profile/Medical/Therapy History Complexity  moderate complexity  -RB        Assessment, Occupational Performance/Identification of Deficit Complexity  moderate complexity  -RB        Clinical Decision Making Complexity (OT)  moderate complexity  -RB        Overall Complexity of Evaluation (OT)  moderate complexity  -RB           Therapy Plan Review/Discharge Plan (OT)    Anticipated Discharge Disposition (OT)  home with 24/7 care;home with assist;home with home health  -RB          User Key  (r) = Recorded By, (t) = Taken By, (c) = Cosigned By    Initials Name Effective Dates    RB Fredi France, DEBBY 06/16/21 -      Robert Chou RN 06/16/21 -          Occupational Therapy Education                 Title: PT OT SLP Therapies (In Progress)     Topic: Occupational Therapy (In Progress)     Point: ADL training (Not Started)     Description:   Instruct learner(s) on proper safety adaptation and remediation techniques during self care or transfers.   Instruct in proper use of assistive devices.              Learner Progress:  Not documented in this visit.          Point: Home exercise program (Not Started)     Description:   Instruct learner(s) on appropriate technique for monitoring, assisting and/or progressing therapeutic exercises/activities.              Learner Progress:  Not documented in this visit.          Point: Precautions (Done)     Description:   Instruct learner(s) on prescribed precautions during self-care and functional transfers.              Learning Progress Summary           Patient Acceptance, E, VU by RB at 7/8/2021 1242    Comment: Edu pt on use of gait belt and non skid socks when OOB and no OOB without assist.                   Point: Body mechanics (Not Started)     Description:   Instruct learner(s) on proper positioning and spine alignment during self-care, functional mobility activities and/or exercises.              Learner Progress:  Not documented in this visit.                       User Key     Initials Effective Dates Name Provider Type Discipline    RB 06/16/21 -  Fredi France, DEBBY Occupational Therapist OT                  OT Recommendation and Plan  Planned Therapy Interventions (OT): activity tolerance training, adaptive equipment training, BADL retraining, functional balance retraining, occupation/activity based interventions, patient/caregiver education/training, ROM/therapeutic exercise, strengthening exercise, transfer/mobility retraining  Therapy Frequency (OT): other (see comments) (5-7 days/wk.)  Plan of Care Review  Plan of Care Reviewed With: patient  Outcome Summary: OT eric on this date.  Pt required CGA for bed mobility and min A for sit to stand to sit.  Pt able to step to head of bed with min/mod A.  She was able to doff/don sock with CGA for sitting balance.  Pt could benefit from OT services to increase safety and independence with ADLs and functional mobility.  Rec home with cont therapy and assist.when D/C from hospital.  Plan of Care Reviewed With: patient  Outcome Summary: OT eric on this date.  Pt required CGA for bed mobility and min A for sit to stand to sit.  Pt able to step to head of bed with min/mod A.  She was able to doff/don sock with CGA for sitting balance.  Pt could benefit from OT services to increase safety and independence with ADLs and functional mobility.  Rec home with cont therapy and assist.when D/C from hospital.    Outcome Measures     Row Name 07/08/21 0855             How much help from another is currently needed...    Putting on and taking off regular lower body clothing?  2  -RB      Bathing (including washing, rinsing, and drying)  2  -RB      Toileting (which includes using toilet bed pan or urinal)  2  -RB      Putting on and taking off regular upper body clothing  3  -RB      Taking care of personal grooming (such as brushing teeth)  3  -RB      Eating meals  4  -RB      AM-PAC 6 Clicks Score (OT)  16  -RB          Functional Assessment    Outcome Measure Options  AM-PAC 6 Clicks Daily Activity (OT)  -RB        User Key  (r) = Recorded By, (t) = Taken By, (c) = Cosigned By    Initials Name Provider Type    Fredi Overton OT Occupational Therapist          Time Calculation:   Time Calculation- OT     Row Name 07/08/21 1246             Time Calculation- OT    OT Start Time  0855  -RB      OT Stop Time  0943  -RB      OT Time Calculation (min)  48 min  -RB      OT Received On  07/08/21  -RB      OT Goal Re-Cert Due Date  07/21/21  -RB        User Key  (r) = Recorded By, (t) = Taken By, (c) = Cosigned By    Initials Name Provider Type    Fredi Overton OT Occupational Therapist        Therapy Charges for Today     Code Description Service Date Service Provider Modifiers Qty    [id number]  OT EVAL MOD COMPLEXITY 3 7/8/2021 Fredi France OT GO 1               Fredi France OT  7/8/2021

## 2021-07-08 NOTE — PROGRESS NOTES
[facility] Medicine Services  INPATIENT PROGRESS NOTE    Length of Stay: 6  Date of Admission: 7/2/2021  Primary Care Physician: Marty, BEBE Luu    Subjective   Chief Complaint: nausea, vomiting   HPI:      Vomiting overnight  Not tolerating a diet  Still nausea     Review of Systems   Constitutional: Negative for chills, diaphoresis and fever.   HENT: Negative for sneezing and sore throat.    Eyes: Negative for pain and discharge.   Respiratory: Negative for cough and shortness of breath.    Gastrointestinal: Positive for nausea and vomiting. Negative for constipation and diarrhea.   Endocrine: Negative for cold intolerance and heat intolerance.   Genitourinary: Negative for difficulty urinating, dysuria, frequency and urgency.   Musculoskeletal: Negative for arthralgias and myalgias.   Skin: Negative for color change and pallor.   Allergic/Immunologic: Negative for environmental allergies and food allergies.   Neurological: Negative for dizziness, syncope and weakness.   Psychiatric/Behavioral: Negative for confusion and sleep disturbance.        All pertinent negatives and positives are as above. All other systems have been reviewed and are negative unless otherwise stated.     Objective    Temp:  [97 °F (36.1 °C)-98 °F (36.7 °C)] 97.3 °F (36.3 °C)  Heart Rate:  [65-78] 73  Resp:  [18] 18  BP: (129-189)/(63-83) 141/64    Physical Exam  Vitals reviewed.   Constitutional:       General: She is not in acute distress.     Appearance: She is well-developed.   HENT:      Head: Normocephalic and atraumatic.      Nose: Nose normal.   Eyes:      Conjunctiva/sclera: Conjunctivae normal.   Cardiovascular:      Rate and Rhythm: Normal rate and regular rhythm.   Pulmonary:      Effort: Pulmonary effort is normal. No respiratory distress.      Breath sounds: Normal breath sounds. No wheezing or rales.   Musculoskeletal:         General: Normal range of motion.      Cervical  back: Normal range of motion and neck supple.   Skin:     General: Skin is warm and dry.   Neurological:      Mental Status: She is alert and oriented to person, place, and time.   Psychiatric:         Behavior: Behavior normal.             Results Review:  I have reviewed the labs, radiology results, and diagnostic studies.    Laboratory Data:   Results from last 7 days   Lab Units 07/08/21  0527 07/07/21  0651 07/06/21  0626 07/04/21  0845 07/03/21  0336 07/02/21  2202   SODIUM mmol/L 143 142 138 139 131* 133*   POTASSIUM mmol/L 3.5 3.4* 3.4* 3.9 4.4 4.0   CHLORIDE mmol/L 107 107 103 104 93* 95*   CO2 mmol/L 28.0 28.0 26.0 27.0 21.0* 25.0   BUN mg/dL 3* 3* 4* 22* 34* 32*   CREATININE mg/dL 0.70 0.71 0.80 1.32* 2.11* 2.00*   GLUCOSE mg/dL 107* 91 173* 161* 392* 241*   CALCIUM mg/dL 9.4 9.3 9.2 9.7 9.3 9.3   BILIRUBIN mg/dL  --   --   --  0.3 0.4 0.3   ALK PHOS U/L  --   --   --  103 139* 129*   ALT (SGPT) U/L  --   --   --  12 18 19   AST (SGOT) U/L  --   --   --  14 16 18   ANION GAP mmol/L 8.0 7.0 9.0 8.0 17.0* 13.0     Estimated Creatinine Clearance: 114.6 mL/min (by C-G formula based on SCr of 0.7 mg/dL).  Results from last 7 days   Lab Units 07/04/21  0845 07/03/21  0336   MAGNESIUM mg/dL 1.7 1.6         Results from last 7 days   Lab Units 07/08/21  0527 07/07/21  0651 07/06/21  0645 07/05/21  0412 07/04/21  0845   WBC 10*3/mm3 8.68 7.06 9.19 11.78* 13.20*   HEMOGLOBIN g/dL 10.4* 10.2* 10.1* 11.0* 10.6*   HEMATOCRIT % 31.8* 31.1* 31.2* 34.2 32.4*   PLATELETS 10*3/mm3 363 325 328 323 330           Culture Data:   No results found for: BLOODCX  No results found for: URINECX  No results found for: RESPCX  No results found for: WOUNDCX  No results found for: STOOLCX  No components found for: BODYFLD    Radiology Data:   Imaging Results (Last 24 Hours)     ** No results found for the last 24 hours. **          I have reviewed the patient current medications.     Assessment/Plan     Active Hospital Problems     Diagnosis  POA   • Acute colitis [K52.9]  Yes       Plan:      1. Acute Colitis  -IVF, antibiotics  -clear liquid diet  -advance diet as tolerated  -d/c IVF when tolerating oral intake     2. Septic shock  -resolved    3. CHON  -resolved    4. DM    5. Left BKA  -staples removed    6. HTN  -home lisinopril     7. Deconditioning  -PT, OT > home health               Discharge Planning: I expect patient to be discharged to home with therapy when tolerating an oral diet     I confirmed that the patient's Advance Care Plan is present, code status is documented, or surrogate decision maker is listed in the patient's medical record.           This document has been electronically signed by Mine Rodarte MD on July 8, 2021 10:07 CDT

## 2021-07-08 NOTE — PLAN OF CARE
Goal Outcome Evaluation:  Plan of Care Reviewed With: patient        Progress: improving  Outcome Summary: pt sup<>sit with CGA-SBA, sit<>stand with min assist, pt stepped 3` & 8` along EOB with min assist of 1. pt will require assistance @ home  &  HHPT @ D/C

## 2021-07-08 NOTE — THERAPY TREATMENT NOTE
Acute Care - Physical Therapy Treatment Note  [facility]     Patient Name: Ariana Martinez  : 1960  MRN: [id number]  Today's Date: 2021           PT Assessment (last 12 hours)      PT Evaluation and Treatment     Row Name 21 1349          Physical Therapy Time and Intention    Subjective Information  complains of;pain  -TA     Document Type  therapy note (daily note)  -TA     Mode of Treatment  individual therapy;physical therapy  -TA     Row Name 21 1349          General Information    Patient Profile Reviewed  yes  -TA     Existing Precautions/Restrictions  fall  -TA     Row Name 21 1349          Cognition    Affect/Mental Status (Cognitive)  WFL  -TA     Orientation Status (Cognition)  oriented x 4  -TA     Personal Safety Interventions  fall prevention program maintained;gait belt;muscle strengthening facilitated;nonskid shoes/slippers when out of bed;supervised activity  -TA     Row Name 21 1349          Pain Scale: Numbers Pre/Post-Treatment    Pretreatment Pain Rating  6/10  -TA     Posttreatment Pain Rating  6/10  -TA     Pain Location  abdomen  -TA     Row Name 21 1349          Range of Motion Comprehensive    General Range of Motion  bilateral lower extremity ROM WFL  -TA     Row Name 21 1349          Strength Comprehensive (MMT)    General Manual Muscle Testing (MMT) Assessment  other (see comments)  -TA     Row Name 21 1349          Mobility    Extremity Weight-bearing Status  left lower extremity  -TA     Left Lower Extremity (Weight-bearing Status)  -- Recent BKA  -TA     Row Name 21 1349          Bed Mobility    Bed Mobility  supine-sit;sit-supine;scooting/bridging  -TA     Scooting/Bridging [place] (Bed Mobility)  standby assist sitting scoots x 3 >HOB  -TA     Supine-Sit [place] (Bed Mobility)  contact guard  -TA     Sit-Supine [place] (Bed Mobility)  standby assist  -TA     Assistive Device (Bed Mobility)  head of bed  elevated;bed rails  -TA     Row Name 07/08/21 1349          Transfers    Transfers  sit-stand transfer;stand-sit transfer  -TA     Comment (Transfers)  pt performed sit<>stand x 5  -TA     Sit-Stand [place] (Transfers)  minimum assist (75% patient effort);verbal cues  -TA     Stand-Sit [place] (Transfers)  minimum assist (75% patient effort);verbal cues  -TA     Row Name 07/08/21 1349          Sit-Stand Transfer    Assistive Device (Sit-Stand Transfers)  walker, front-wheeled  -TA     Row Name 07/08/21 1349          Gait/Stairs (Locomotion)    [place] Level (Gait)  minimum assist (75% patient effort);verbal cues  -TA     Assistive Device (Gait)  walker, front-wheeled  -TA     Distance in Feet (Gait)  3` & 8`  -TA     Row Name 07/08/21 1349          Safety Issues, Functional Mobility    Impairments Affecting Function (Mobility)  strength;endurance/activity tolerance;balance  -TA     Row Name             Wound 07/02/21 0045 Left distal leg Incision    Wound - Properties Group Placement Date: 07/02/21 -TH Placement Time: 0045 -TH Present on Hospital Admission: Y  -TH Side: Left  -TH Orientation: distal  -TH Location: leg  -TH Primary Wound Type: Incision  -TH, staples     Retired Wound - Properties Group Date first assessed: 07/02/21 -TH Time first assessed: 0045 -TH Present on Hospital Admission: Y  -TH Side: Left  -TH Location: leg  -TH Primary Wound Type: Incision  -TH, staples     Row Name 07/08/21 1349          Plan of Care Review    Plan of Care Reviewed With  patient  -TA     Progress  improving  -TA     Outcome Summary  pt sup<>sit with CGA-SBA, sit<>stand with min assist, pt stepped 3` & 8` along EOB with min assist of 1. pt will require assistance @ home  &  HHPT @ D/C  -TA     Row Name 07/08/21 1349          Vital Signs    Pre Systolic BP Rehab  170  -TA     Pre Treatment Diastolic BP  70  -TA     Post Systolic BP Rehab  135  -TA     Post Treatment Diastolic BP  61  -TA     Pretreatment  Heart Rate (beats/min)  71  -TA     Intratreatment Heart Rate (beats/min)  91  -TA     Posttreatment Heart Rate (beats/min)  73  -TA     Pre SpO2 (%)  96  -TA     O2 Delivery Pre Treatment  room air  -TA     Intra SpO2 (%)  98  -TA     O2 Delivery Intra Treatment  room air  -TA     Post SpO2 (%)  94  -TA     O2 Delivery Post Treatment  room air  -TA     Pre Patient Position  Supine  -TA     Post Patient Position  Supine  -TA     Row Name 07/08/21 5339          Bed Mobility Goal 1 (PT)    Activity/Assistive Device (Bed Mobility Goal 1, PT)  sit to supine/supine to sit  -TA     [place] Level/Cues Needed (Bed Mobility Goal 1, PT)  independent  -TA     Time Frame (Bed Mobility Goal 1, PT)  by discharge  -TA     Strategies/Barriers (Bed Mobility Goal 1, PT)  HOB flat, no bed rails.  -TA     Progress/Outcomes (Bed Mobility Goal 1, PT)  goal not met  -TA     Row Name 07/08/21 1343          Transfer Goal 1 (PT)    Activity/Assistive Device (Transfer Goal 1, PT)  sit-to-stand/stand-to-sit;bed-to-chair/chair-to-bed;walker, rolling  -TA     [place] Level/Cues Needed (Transfer Goal 1, PT)  supervision required  -TA     Time Frame (Transfer Goal 1, PT)  by discharge  -TA     Strategies/Barriers (Transfers Goal 1, PT)  L BKA.  -TA     Progress/Outcome (Transfer Goal 1, PT)  goal not met  -TA     Row Name 07/08/21 1342          Gait Training Goal 1 (PT)    Activity/Assistive Device (Gait Training Goal 1, PT)  walker, rolling  -TA     [place] Level (Gait Training Goal 1, PT)  contact guard assist  -TA     Distance (Gait Training Goal 1, PT)  10' x 1.  -TA     Time Frame (Gait Training Goal 1, PT)  by discharge  -TA     Strategies/Barriers (Gait Training Goal 1, PT)  L BKA, recent fall during ambulation.  -TA     Progress/Outcome (Gait Training Goal 1, PT)  goal not met  -TA     Row Name 07/08/21 6837          ROM Goal 1 (PT)    ROM Goal 1 (PT)  Patient will re-wrap L BKA in figure-8 pattern, each shift, with ace  wrap.  -TA     Time Frame (ROM Goal 1, PT)  by discharge  -TA     Strategies/Barriers (ROM Goal 1, PT)  L BKA.  -TA     Progress/Outcome (ROM Goal 1, PT)  goal not met  -TA     Row Name 07/08/21 1349          Positioning and Restraints    Pre-Treatment Position  in bed  -TA     Post Treatment Position  bed  -TA     In Bed  supine;call light within reach;exit alarm on  -TA     Row Name 07/08/21 1349          Therapy Assessment/Plan (PT)    Rehab Potential (PT)  good, to achieve stated therapy goals  -TA     Criteria for Skilled Interventions Met (PT)  yes;skilled treatment is necessary  -TA     Comment, Therapy Assessment/Plan (PT)  continue  -TA       User Key  (r) = Recorded By, (t) = Taken By, (c) = Cosigned By    Initials Name Provider Type     Robert Chou, RN Registered Nurse    Rosalia Ambrocio, MANJINDER Physical Therapy Assistant        Physical Therapy Education                 Title: PT OT SLP Therapies (In Progress)     Topic: Physical Therapy (In Progress)     Point: Mobility training (Done)     Learning Progress Summary           Patient Acceptance, E, VU,NR by  at 7/7/2021 1414    Comment: Educated on proper acewrapping technique for L BKA, proper use of walker, proper hand placement with transfers.                   Point: Home exercise program (Not Started)     Learner Progress:  Not documented in this visit.          Point: Body mechanics (Done)     Learning Progress Summary           Patient Acceptance, E,TB, VU by MB at 7/8/2021 0927                   Point: Precautions (Not Started)     Learner Progress:  Not documented in this visit.                      User Key     Initials Effective Dates Name Provider Type Discipline    MB 06/16/21 -  Devora Presley RN Registered Nurse Nurse     06/16/21 -  Carlito Montoya, PT Physical Therapist PT              PT Recommendation and Plan  Anticipated Discharge Disposition (PT): home with home health  Therapy Frequency (PT): daily  Plan of Care  Reviewed With: patient  Progress: improving  Outcome Summary: pt sup<>sit with CGA-SBA, sit<>stand with min assist, pt stepped 3` & 8` along EOB with min assist of 1. pt will require assistance @ home  &  HHPT @ D/C  Outcome Measures     Row Name 07/08/21 1600 07/08/21 0855          How much help from another person do you currently need...    Turning from your back to your side while in flat bed without using bedrails?  3  -TA  --     Moving from lying on back to sitting on the side of a flat bed without bedrails?  3  -TA  --     Moving to and from a bed to a chair (including a wheelchair)?  3  -TA  --     Standing up from a chair using your arms (e.g., wheelchair, bedside chair)?  3  -TA  --     Climbing 3-5 steps with a railing?  1  -TA  --     To walk in hospital room?  3  -TA  --     AM-PAC 6 Clicks Score (PT)  16  -TA  --        How much help from another is currently needed...    Putting on and taking off regular lower body clothing?  --  2  -RB     Bathing (including washing, rinsing, and drying)  --  2  -RB     Toileting (which includes using toilet bed pan or urinal)  --  2  -RB     Putting on and taking off regular upper body clothing  --  3  -RB     Taking care of personal grooming (such as brushing teeth)  --  3  -RB     Eating meals  --  4  -RB     AM-PAC 6 Clicks Score (OT)  --  16  -RB        Functional Assessment    Outcome Measure Options  AM-PAC 6 Clicks Basic Mobility (PT)  -TA  AM-PAC 6 Clicks Daily Activity (OT)  -RB       User Key  (r) = Recorded By, (t) = Taken By, (c) = Cosigned By    Initials Name Provider Type    RB Fredi France OT Occupational Therapist    Rosalia Ambrocio, PTA Physical Therapy Assistant           Time Calculation:   PT Charges     Row Name 07/08/21 1630             Time Calculation    Start Time  1349  -TA      Stop Time  1437  -TA      Time Calculation (min)  48 min  -TA      PT Received On  07/08/21  -TA         Time Calculation- PT    Total Timed Code Minutes- PT   48 minute(s)  -TA         Timed Charges    55670 - PT Therapeutic Activity Minutes  48  -TA         Total Minutes    Timed Charges Total Minutes  48  -TA       Total Minutes  48  -TA        User Key  (r) = Recorded By, (t) = Taken By, (c) = Cosigned By    Initials Name Provider Type    Rosalia Ambrocio PTA Physical Therapy Assistant        Therapy Charges for Today     Code Description Service Date Service Provider Modifiers Qty    94112341852 HC PT THERAPEUTIC ACT EA 15 MIN 7/8/2021 Rosalia King PTA GP 3          PT G-Codes  Outcome Measure Options: AM-PAC 6 Clicks Basic Mobility (PT)  AM-PAC 6 Clicks Score (PT): 16  AM-PAC 6 Clicks Score (OT): 16    Rosalia King PTA  7/8/2021

## 2021-07-09 LAB
ANION GAP SERPL CALCULATED.3IONS-SCNC: 4 MMOL/L (ref 5–15)
BASOPHILS # BLD AUTO: 0.04 10*3/MM3 (ref 0–0.2)
BASOPHILS NFR BLD AUTO: 0.5 % (ref 0–1.5)
BUN SERPL-MCNC: 2 MG/DL (ref 6–20)
BUN/CREAT SERPL: 2.4 (ref 7–25)
CALCIUM SPEC-SCNC: 9.3 MG/DL (ref 8.6–10.5)
CHLORIDE SERPL-SCNC: 106 MMOL/L (ref 98–107)
CO2 SERPL-SCNC: 31 MMOL/L (ref 22–29)
CREAT SERPL-MCNC: 0.82 MG/DL (ref 0.57–1)
DEPRECATED RDW RBC AUTO: 44.8 FL (ref 37–54)
EOSINOPHIL # BLD AUTO: 0.52 10*3/MM3 (ref 0–0.4)
EOSINOPHIL NFR BLD AUTO: 6.8 % (ref 0.3–6.2)
ERYTHROCYTE [DISTWIDTH] IN BLOOD BY AUTOMATED COUNT: 14.2 % (ref 12.3–15.4)
GFR SERPL CREATININE-BSD FRML MDRD: 71 ML/MIN/1.73
GLUCOSE BLDC GLUCOMTR-MCNC: 126 MG/DL (ref 70–130)
GLUCOSE BLDC GLUCOMTR-MCNC: 190 MG/DL (ref 70–130)
GLUCOSE BLDC GLUCOMTR-MCNC: 199 MG/DL (ref 70–130)
GLUCOSE BLDC GLUCOMTR-MCNC: 207 MG/DL (ref 70–130)
GLUCOSE BLDC GLUCOMTR-MCNC: 212 MG/DL (ref 70–130)
GLUCOSE SERPL-MCNC: 121 MG/DL (ref 65–99)
HCT VFR BLD AUTO: 31.9 % (ref 34–46.6)
HGB BLD-MCNC: 10.4 G/DL (ref 12–15.9)
IMM GRANULOCYTES # BLD AUTO: 0.04 10*3/MM3 (ref 0–0.05)
IMM GRANULOCYTES NFR BLD AUTO: 0.5 % (ref 0–0.5)
LYMPHOCYTES # BLD AUTO: 2.21 10*3/MM3 (ref 0.7–3.1)
LYMPHOCYTES NFR BLD AUTO: 28.7 % (ref 19.6–45.3)
MCH RBC QN AUTO: 28.7 PG (ref 26.6–33)
MCHC RBC AUTO-ENTMCNC: 32.6 G/DL (ref 31.5–35.7)
MCV RBC AUTO: 87.9 FL (ref 79–97)
MONOCYTES # BLD AUTO: 0.59 10*3/MM3 (ref 0.1–0.9)
MONOCYTES NFR BLD AUTO: 7.7 % (ref 5–12)
NEUTROPHILS NFR BLD AUTO: 4.29 10*3/MM3 (ref 1.7–7)
NEUTROPHILS NFR BLD AUTO: 55.8 % (ref 42.7–76)
NRBC BLD AUTO-RTO: 0 /100 WBC (ref 0–0.2)
PLATELET # BLD AUTO: 351 10*3/MM3 (ref 140–450)
PMV BLD AUTO: 9.3 FL (ref 6–12)
POTASSIUM SERPL-SCNC: 3.4 MMOL/L (ref 3.5–5.2)
POTASSIUM SERPL-SCNC: 4.4 MMOL/L (ref 3.5–5.2)
RBC # BLD AUTO: 3.63 10*6/MM3 (ref 3.77–5.28)
SODIUM SERPL-SCNC: 141 MMOL/L (ref 136–145)
WBC # BLD AUTO: 7.69 10*3/MM3 (ref 3.4–10.8)

## 2021-07-09 PROCEDURE — 25010000002 ONDANSETRON PER 1 MG: Performed by: INTERNAL MEDICINE

## 2021-07-09 PROCEDURE — 80048 BASIC METABOLIC PNL TOTAL CA: CPT | Performed by: INTERNAL MEDICINE

## 2021-07-09 PROCEDURE — 63710000001 INSULIN DETEMIR PER 5 UNITS: Performed by: INTERNAL MEDICINE

## 2021-07-09 PROCEDURE — 63710000001 INSULIN ASPART PER 5 UNITS: Performed by: HOSPITALIST

## 2021-07-09 PROCEDURE — 97535 SELF CARE MNGMENT TRAINING: CPT

## 2021-07-09 PROCEDURE — 82962 GLUCOSE BLOOD TEST: CPT

## 2021-07-09 PROCEDURE — 85025 COMPLETE CBC W/AUTO DIFF WBC: CPT | Performed by: INTERNAL MEDICINE

## 2021-07-09 PROCEDURE — 25010000002 PROCHLORPERAZINE 10 MG/2ML SOLUTION: Performed by: INTERNAL MEDICINE

## 2021-07-09 PROCEDURE — 84132 ASSAY OF SERUM POTASSIUM: CPT | Performed by: STUDENT IN AN ORGANIZED HEALTH CARE EDUCATION/TRAINING PROGRAM

## 2021-07-09 PROCEDURE — 97116 GAIT TRAINING THERAPY: CPT

## 2021-07-09 PROCEDURE — 25010000002 PIPERACILLIN SOD-TAZOBACTAM PER 1 G: Performed by: INTERNAL MEDICINE

## 2021-07-09 PROCEDURE — 25010000002 HYDROMORPHONE 1 MG/ML SOLUTION: Performed by: INTERNAL MEDICINE

## 2021-07-09 PROCEDURE — 97530 THERAPEUTIC ACTIVITIES: CPT

## 2021-07-09 PROCEDURE — 25010000002 ENOXAPARIN PER 10 MG: Performed by: INTERNAL MEDICINE

## 2021-07-09 PROCEDURE — 97110 THERAPEUTIC EXERCISES: CPT

## 2021-07-09 RX ORDER — POTASSIUM CHLORIDE 7.45 MG/ML
10 INJECTION INTRAVENOUS
Status: DISCONTINUED | OUTPATIENT
Start: 2021-07-09 | End: 2021-07-12 | Stop reason: HOSPADM

## 2021-07-09 RX ORDER — MAGNESIUM SULFATE HEPTAHYDRATE 40 MG/ML
4 INJECTION, SOLUTION INTRAVENOUS AS NEEDED
Status: DISCONTINUED | OUTPATIENT
Start: 2021-07-09 | End: 2021-07-12 | Stop reason: HOSPADM

## 2021-07-09 RX ORDER — POTASSIUM CHLORIDE 750 MG/1
40 CAPSULE, EXTENDED RELEASE ORAL AS NEEDED
Status: DISCONTINUED | OUTPATIENT
Start: 2021-07-09 | End: 2021-07-12 | Stop reason: HOSPADM

## 2021-07-09 RX ORDER — POTASSIUM CHLORIDE 7.45 MG/ML
10 INJECTION INTRAVENOUS
Status: DISCONTINUED | OUTPATIENT
Start: 2021-07-09 | End: 2021-07-09

## 2021-07-09 RX ORDER — POTASSIUM CHLORIDE 750 MG/1
40 CAPSULE, EXTENDED RELEASE ORAL AS NEEDED
Status: DISCONTINUED | OUTPATIENT
Start: 2021-07-09 | End: 2021-07-09

## 2021-07-09 RX ORDER — MAGNESIUM SULFATE HEPTAHYDRATE 40 MG/ML
2 INJECTION, SOLUTION INTRAVENOUS AS NEEDED
Status: DISCONTINUED | OUTPATIENT
Start: 2021-07-09 | End: 2021-07-12 | Stop reason: HOSPADM

## 2021-07-09 RX ORDER — POTASSIUM CHLORIDE 1.5 G/1.77G
40 POWDER, FOR SOLUTION ORAL AS NEEDED
Status: DISCONTINUED | OUTPATIENT
Start: 2021-07-09 | End: 2021-07-12 | Stop reason: HOSPADM

## 2021-07-09 RX ORDER — LEVOFLOXACIN 500 MG/1
500 TABLET, FILM COATED ORAL EVERY 24 HOURS
Status: DISCONTINUED | OUTPATIENT
Start: 2021-07-09 | End: 2021-07-12 | Stop reason: HOSPADM

## 2021-07-09 RX ORDER — POTASSIUM CHLORIDE 1.5 G/1.77G
40 POWDER, FOR SOLUTION ORAL AS NEEDED
Status: DISCONTINUED | OUTPATIENT
Start: 2021-07-09 | End: 2021-07-09

## 2021-07-09 RX ORDER — METRONIDAZOLE 250 MG/1
500 TABLET ORAL EVERY 8 HOURS SCHEDULED
Status: DISCONTINUED | OUTPATIENT
Start: 2021-07-09 | End: 2021-07-12 | Stop reason: HOSPADM

## 2021-07-09 RX ORDER — CIPROFLOXACIN 250 MG/1
500 TABLET, FILM COATED ORAL EVERY 12 HOURS SCHEDULED
Status: DISCONTINUED | OUTPATIENT
Start: 2021-07-09 | End: 2021-07-09

## 2021-07-09 RX ADMIN — SODIUM CHLORIDE, POTASSIUM CHLORIDE, SODIUM LACTATE AND CALCIUM CHLORIDE 100 ML/HR: 600; 310; 30; 20 INJECTION, SOLUTION INTRAVENOUS at 06:30

## 2021-07-09 RX ADMIN — HYDROMORPHONE HYDROCHLORIDE 0.5 MG: 1 INJECTION, SOLUTION INTRAMUSCULAR; INTRAVENOUS; SUBCUTANEOUS at 08:15

## 2021-07-09 RX ADMIN — VENLAFAXINE HYDROCHLORIDE 150 MG: 75 CAPSULE, EXTENDED RELEASE ORAL at 08:19

## 2021-07-09 RX ADMIN — GABAPENTIN 300 MG: 300 CAPSULE ORAL at 21:42

## 2021-07-09 RX ADMIN — DOCOSANOL: 100 CREAM TOPICAL at 06:11

## 2021-07-09 RX ADMIN — METRONIDAZOLE 500 MG: 500 TABLET ORAL at 13:17

## 2021-07-09 RX ADMIN — POTASSIUM CHLORIDE 40 MEQ: 750 CAPSULE, EXTENDED RELEASE ORAL at 15:06

## 2021-07-09 RX ADMIN — PIPERACILLIN SODIUM AND TAZOBACTAM SODIUM 3.38 G: 3; .375 INJECTION, POWDER, LYOPHILIZED, FOR SOLUTION INTRAVENOUS at 06:30

## 2021-07-09 RX ADMIN — METRONIDAZOLE 500 MG: 500 TABLET ORAL at 21:42

## 2021-07-09 RX ADMIN — ENOXAPARIN SODIUM 40 MG: 40 INJECTION SUBCUTANEOUS at 08:20

## 2021-07-09 RX ADMIN — ONDANSETRON HYDROCHLORIDE 4 MG: 2 INJECTION, SOLUTION INTRAMUSCULAR; INTRAVENOUS at 08:15

## 2021-07-09 RX ADMIN — ARIPIPRAZOLE 15 MG: 15 TABLET ORAL at 08:19

## 2021-07-09 RX ADMIN — HYDROMORPHONE HYDROCHLORIDE 0.5 MG: 1 INJECTION, SOLUTION INTRAMUSCULAR; INTRAVENOUS; SUBCUTANEOUS at 12:38

## 2021-07-09 RX ADMIN — CLOPIDOGREL BISULFATE 75 MG: 75 TABLET ORAL at 08:19

## 2021-07-09 RX ADMIN — PROCHLORPERAZINE EDISYLATE 5 MG: 5 INJECTION INTRAMUSCULAR; INTRAVENOUS at 12:39

## 2021-07-09 RX ADMIN — SODIUM CHLORIDE, PRESERVATIVE FREE 10 ML: 5 INJECTION INTRAVENOUS at 20:38

## 2021-07-09 RX ADMIN — LORAZEPAM 0.5 MG: 0.5 TABLET ORAL at 23:27

## 2021-07-09 RX ADMIN — SODIUM CHLORIDE, POTASSIUM CHLORIDE, SODIUM LACTATE AND CALCIUM CHLORIDE 100 ML/HR: 600; 310; 30; 20 INJECTION, SOLUTION INTRAVENOUS at 16:19

## 2021-07-09 RX ADMIN — ATORVASTATIN CALCIUM 80 MG: 40 TABLET, FILM COATED ORAL at 08:19

## 2021-07-09 RX ADMIN — INSULIN ASPART 3 UNITS: 100 INJECTION, SOLUTION INTRAVENOUS; SUBCUTANEOUS at 10:52

## 2021-07-09 RX ADMIN — LEVOFLOXACIN 500 MG: 500 TABLET, FILM COATED ORAL at 10:51

## 2021-07-09 RX ADMIN — ASPIRIN 81 MG: 81 TABLET, FILM COATED ORAL at 08:19

## 2021-07-09 RX ADMIN — FOLIC ACID 1 MG: 1 TABLET ORAL at 08:19

## 2021-07-09 RX ADMIN — DOCOSANOL: 100 CREAM TOPICAL at 13:17

## 2021-07-09 RX ADMIN — INSULIN DETEMIR 40 UNITS: 100 INJECTION, SOLUTION SUBCUTANEOUS at 21:46

## 2021-07-09 RX ADMIN — HYDROMORPHONE HYDROCHLORIDE 0.5 MG: 1 INJECTION, SOLUTION INTRAMUSCULAR; INTRAVENOUS; SUBCUTANEOUS at 22:32

## 2021-07-09 RX ADMIN — LISINOPRIL 20 MG: 20 TABLET ORAL at 08:19

## 2021-07-09 RX ADMIN — SODIUM CHLORIDE, PRESERVATIVE FREE 10 ML: 5 INJECTION INTRAVENOUS at 08:22

## 2021-07-09 RX ADMIN — GABAPENTIN 300 MG: 300 CAPSULE ORAL at 13:17

## 2021-07-09 RX ADMIN — PROCHLORPERAZINE EDISYLATE 5 MG: 5 INJECTION INTRAMUSCULAR; INTRAVENOUS at 02:22

## 2021-07-09 RX ADMIN — TAMSULOSIN HYDROCHLORIDE 0.4 MG: 0.4 CAPSULE ORAL at 08:19

## 2021-07-09 RX ADMIN — DOCOSANOL: 100 CREAM TOPICAL at 10:53

## 2021-07-09 RX ADMIN — DOCOSANOL: 100 CREAM TOPICAL at 17:01

## 2021-07-09 RX ADMIN — FAMOTIDINE 20 MG: 10 INJECTION INTRAVENOUS at 08:18

## 2021-07-09 RX ADMIN — HYDROMORPHONE HYDROCHLORIDE 0.5 MG: 1 INJECTION, SOLUTION INTRAMUSCULAR; INTRAVENOUS; SUBCUTANEOUS at 18:15

## 2021-07-09 RX ADMIN — GABAPENTIN 300 MG: 300 CAPSULE ORAL at 06:11

## 2021-07-09 RX ADMIN — INSULIN ASPART 5 UNITS: 100 INJECTION, SOLUTION INTRAVENOUS; SUBCUTANEOUS at 17:01

## 2021-07-09 RX ADMIN — POTASSIUM CHLORIDE 40 MEQ: 750 CAPSULE, EXTENDED RELEASE ORAL at 10:51

## 2021-07-09 RX ADMIN — PROCHLORPERAZINE EDISYLATE 5 MG: 5 INJECTION INTRAMUSCULAR; INTRAVENOUS at 20:37

## 2021-07-09 RX ADMIN — DOCOSANOL: 100 CREAM TOPICAL at 21:43

## 2021-07-09 NOTE — NURSING NOTE
F/C d/c'd per hospital policy. Will monitor per protocol and initiate interventions as needed. Pt nauseated this morning, but willing to work with PT. Holding diet at clear liquids at this time.

## 2021-07-09 NOTE — CONSULTS
Adult Nutrition  Assessment    Patient Name:  Ariana Martinez  YOB: 1960  MRN: [id number]  Admit Date:  7/2/2021    Assessment Date:  7/9/2021    Comments:  Pt with improving Gi distress. She is being advanced to a Regular bland diet for lunch.  We discussed her recent wt loss and decreased appetite.  She has lost ~30# since her original surgery, BKA due to poor appetite.  She started getting her appetite back and then developed colitis.  She has had inadequate oral intake since admit--basically NPO or on clear liquids since admit.  She meets criteria for Acute severe malnutrition.  RD will monitor her po tolerance and add supplements.  She would benefit from ADA education but she continues to decline.  Will attach diet copies and contact number for her to receive at discharge.     Reason for Assessment     Row Name 07/09/21 1441          Reason for Assessment    Reason For Assessment  follow-up protocol         Nutrition/Diet History     Row Name 07/09/21 1441          Nutrition/Diet History    Typical Food/Fluid Intake  Pt reports that she is still have abd pain--but now as bad and still having nausea but not as bad.  She is getting solid food today.  Willing to try Boost GC                 Nutrition Prescription Ordered     Row Name 07/09/21 1442          Nutrition Prescription PO    Current PO Diet  Regular     Fluid Consistency  Thin     Common Modifiers  GI Soft/[place];Consistent Carbohydrate         Evaluation of Received Nutrient/Fluid Intake     Row Name 07/09/21 1443          PO Evaluation    Number of Meals  3     % PO Intake  25x3 meals           Malnutrition Severity Assessment     Row Name 07/09/21 1451          Malnutrition Severity Assessment    Malnutrition Type  Acute Disease or Injury - Related Malnutrition        Insufficient Energy Intake     Insufficient Energy Intake   < or equal to 50% of est. energy requirement for > or equal to 5d)        Unintentional Weight Loss      Unintentional Weight Loss   Weight loss greater than 7.5% in three months wt loss of 11.5% since May 2021        Criteria Met (Must meet criteria for severity in at least 2 of these categories: M Wasting, Fat Loss, Fluid, Secondary Signs, Wt. Status, Intake)    Patient meets criteria for   Severe Malnutrition           Electronically signed by:  Lisa French RD  07/09/21 14:53 CDT

## 2021-07-09 NOTE — DISCHARGE PLACEMENT REQUEST
"Ariana Bailey (61 y.o. Female)     Date of Birth Social Security Number Address Home Phone MRN    1960  [address] [place] [id number] [phone] [id number]    Episcopalian Marital Status          Protestant        Admission Date Admission Type Admitting Provider Attending Provider Department, Room/Bed    21 Emergency Behroozi, Saeid, MD Ebenibo, Sotonte E, MD [address], [facility]/1    Discharge Date Discharge Disposition Discharge Destination                       Attending Provider: Brandon Martel MD    Allergies: Seroquel [Quetiapine], Avandia [Rosiglitazone], Morphine And Related, Oxycodone    Isolation: None   Infection: None   Code Status: CPR    Ht: 172.7 cm (68\")   Wt: 113 kg (248 lb 12.8 oz)    Admission Cmt: None   Principal Problem: None                Active Insurance as of 2021     Primary Coverage     Payor Plan Insurance Group Employer/Plan Group    [employer] [facility] [employer] EMPLOYEE [id number]     Payor Plan Address Payor Plan Phone Number Payor Plan Fax Number Effective Dates    [address] [phone]  2015 - None Entered    [place] [address]       Subscriber Name Subscriber Birth Date Member ID       ARIANA BAILEY 1960 [id number]                 Emergency Contacts      (Rel.) Home Phone Work Phone Mobile Phone    Nadeem Bailey (Spouse) [phone] -- [phone]    Елена David (Sister) [phone] -- [phone]        [address] [address]  Phone:  [phone]  Fax:  [phone] Date: 2021      Ambulatory Referral to Home Health     Patient:  Ariana Bailey MRN:  [id number]   [address] [facility] [address] :  1960  SSN:    Phone: [phone] Sex:  F      INSURANCE PAYOR PLAN GROUP # SUBSCRIBER ID   Primary:    CHUY MEJÍA [employer] [id number]      Referring Provider " Information:  MINE RODARTE Phone: [phone] Fax: [phone]      Referral Information:   # Visits:  999 Referral Type: Home Health [42]   Urgency:  Routine Referral Reason: Specialty Services Required   Start Date: Jul 9, 2021 End Date:  To be determined by Insurer   Diagnosis: Impaired physical mobility (Z74.09 [ICD-10-CM] 781.99 [ICD-9-CM])  Impaired mobility and activities of daily living (Z74.09,Z78.9 [ICD-10-CM] V49.89 [ICD-9-CM])      Refer to Dept:   Refer to Provider:   Refer to Facility:       Face to Face Visit Date: 7/9/2021  Follow-up provider for Plan of Care? I treated the patient in an acute care facility and will not continue treatment after discharge.  Follow-up provider: ETIENNE GONZALEZ [[id number]]  Reason/Clinical Findings: decreased mobility  Describe mobility limitations that make leaving home difficult: decreased mobility  Nursing/Therapeutic Services Requested: Physical Therapy  Nursing/Therapeutic Services Requested: Occupational Therapy  PT orders: Total joint pathway  PT orders: Therapeutic exercise  PT orders: Gait Training  PT orders: Transfer training  PT orders: Strengthening  PT orders: Home safety assessment  Weight Bearing Status: As Tolerated  Occupational orders: Activities of daily living  Occupational orders: Energy conservation  Occupational orders: Strengthening  Occupational orders: Home safety assessment  Frequency: 1 Week 1     This document serves as a request of services and does not constitute Insurance authorization or approval of services.  To determine eligibility, please contact the members Insurance carrier to verify and review coverage.     If you have medical questions regarding this request for services. Please contact [address] at [phone] during normal business hours.       Authorizing Provider:Mine Rodarte MD  Authorizing Provider's NPI: [id number]  Order Entered By: Mine Rodarte MD 7/9/2021 10:24  AM     Electronically signed by: Mine Rodarte MD 7/9/2021 10:24 AM            Insurance Information                [employer] EMPLOYEE Phone: [phone]    Subscriber: Ariana Martinez Subscriber#: [id number]    Group#: [id number] Precert#:              History & Physical      Behroozi, Saeid, MD at 07/02/21 0204                [facility] Medicine Admission      Date of Admission: 7/2/2021      Primary Care Physician: Kimberly Ferguson APRN      Chief Complaint: Abdominal pain,    HPI:  Patient is a 61-year-old obese multimorbid female with known past medical history of coronary artery disease, CABG in 2005, diabetes mellitus, hypertension, left below-knee amputation, congestive heart failure rate, dyslipidemia who presented to the ER with complaint of abdominal pain for 2 days.  She was hypotensive was given IV fluid, central line was placed in ED and  underwent CT of the abdomen and pelvis which was concerning for colitis.  Pathology service was called for admission of the patient.  He was seen and examined in ED room 6.  Reported for the last 3 days she has mid abdominal pain.  Had nausea and episode of vomiting nonbilious nonbloody.  She had more frequent bowel movements treat today with diarrhea.  She denied any fall injury trauma, fever chills shortness of breath.  In ED she reported chest pain which she did not report to me.  She denies being on any antibiotic outpatient.  She had hospitalization in May 2021.  She mobilized with wheelchair at home    Concurrent Medical History:  has a past medical history of Angina, class IV (CMS/HCC), Anxiety, Anxiety and depression, Arthritis, Benign paroxysmal positional vertigo, Bladder disorder, Carpal tunnel syndrome, CHF (congestive heart failure) (CMS/HCC), Chronic pain, Coronary atherosclerosis, Depression, Diabetes mellitus (CMS/HCC), Diabetic polyneuropathy (CMS/HCC), Disease of  thyroid gland, Elevated cholesterol, Female stress incontinence, Foot pain, left, Full dentures, Gastroparesis, GERD (gastroesophageal reflux disease), Hyperlipidemia, Hypertension, Low back pain, Malaise and fatigue, Multiple joint pain, Obesity, Occlusion and stenosis of bilateral carotid arteries (6/18/2021), Otalgia, Perforation of tympanic membrane, Postoperative wound infection, Vitamin D deficiency, and Wears glasses.    Past Surgical History:  has a past surgical history that includes Abdominal surgery; Angioplasty; Coronary artery bypass graft (2005); Cardiac catheterization; Carpal tunnel release; Cholecystectomy; endoscopy and colonoscopy; Mouth surgery; transesophageal echocardiogram (mildred); Foot surgery; gastric banding; Hysterectomy; Shoulder surgery; Salpingoophorectomy; Cardiac catheterization (N/A, 6/20/2017); Breast biopsy (Right); Esophagogastroduodenoscopy (N/A, 10/19/2018); subtalar arthrodesis (Left, 1/16/2019); subtalar arthrodesis (Left, 10/16/2019); Foot surgery; Cardiac catheterization (N/A, 2/18/2020); Esophagogastroduodenoscopy (N/A, 6/24/2020); Colonoscopy (N/A, 6/24/2020); subtalar arthrodesis (Left, 9/30/2020); Ankle Arthroscopy (Left, 2/26/2021); incision and drainage leg (Left, 3/12/2021); and Foot Amputation.    Family History: family history includes Diabetes in her sister, sister, sister, sister, sister, sister and another family member; Heart disease in her mother, sister, sister, and another family member; Hypertension in her mother, sister, sister, and another family member; Stroke in her mother.     Social History:  reports that she has never smoked. She has never used smokeless tobacco. She reports that she does not drink alcohol and does not use drugs.    Allergies:   Allergies   Allergen Reactions   • Seroquel [Quetiapine] Anaphylaxis   • Avandia [Rosiglitazone] Swelling   • Morphine And Related Hallucinations   • Oxycodone Hallucinations       Medications:   Prior to  Admission medications    Medication Sig Start Date End Date Taking? Authorizing Provider   ARIPiprazole (ABILIFY) 15 MG tablet TAKE 1 TABLET BY MOUTH EVERY DAY 5/4/21   Marty, BEBE Luu   aspirin (aspirin) 81 MG EC tablet Take 81 mg by mouth Daily.    ProviderEsther MD   atorvastatin (LIPITOR) 80 MG tablet TAKE 1 TABLET BY MOUTH EVERY DAY 6/14/21   Kimberly Ferguson APRN   BD SHARPS CONTAINER HOME misc 1 each Take As Directed. 9/7/18   Francisca Fong MD   Blood Glucose Monitoring Suppl (ONE TOUCH ULTRA MINI) w/Device kit USE AS DIRECTED TO CHECK BLOOD SUGAR 7/11/18   Francisca Fong MD   Calcium Citrate-Vitamin D (CITRACAL/VITAMIN D) 250-200 MG-UNIT tablet Take 2 tablets by mouth 2 (two) times a day.    ProviderEsther MD   clopidogrel (PLAVIX) 75 MG tablet TAKE 1 TABLET BY MOUTH EVERY DAY 6/14/21   Marty, BEBE Luu   cyanocobalamin 1000 MCG/ML injection INJECT 1 ML INTO THE APPROPRIATE MUSCLE AS DIRECTED BY PRESCRIBER EVERY 28 (TWENTY-EIGHT) DAYS. 5/5/21   Geri Baig MD   folic acid (FOLVITE) 1 MG tablet Take 1 tablet by mouth Daily. 4/22/21   Teressa Hammond APRN   furosemide (LASIX) 40 MG tablet TAKE 1 TABLET BY MOUTH EVERY DAY 4/5/21   Kimberly Ferguson APRN   gabapentin (NEURONTIN) 400 MG capsule Take 1 capsule by mouth 3 (Three) Times a Day. 4/20/21   Kimberly Ferguson APRN   GLUCAGON EMERGENCY 1 MG injection USE AS DIRECTED AS NEEDED 7/28/17   Elvis Gamboa APRN   glucose blood (Accu-Chek Iris Plus) test strip USE TO CHECK BLOOD SUGAR 4 TIMES DAILY. E11.9 4/12/21   Elvis Gamboa APRN   glucose monitor monitoring kit 1 each Daily. accucheck iris meter, E11.9 6/11/20   Elvis Gamboa APRN   hydroCHLOROthiazide (HYDRODIURIL) 25 MG tablet Take 25 mg by mouth Daily. 5/18/21   Esther Henao MD   HYDROcodone-acetaminophen (NORCO) 7.5-325 MG per tablet Take 1 tablet by mouth Every 6 (Six) Hours As Needed for Moderate Pain .  6/24/21   Kimberly Ferguson APRN   Insulin Glargine (BASAGLAR KWIKPEN) 100 UNIT/ML injection pen Inject 60 Units under the skin into the appropriate area as directed Every Night.    Provider, MD Esther   Insulin Pen Needle (B-D ULTRAFINE III SHORT PEN) 31G X 8 MM misc USE TO INJECT 4 TIMES A DAY 12/27/20   Elvis Gamboa APRN   lisinopril (PRINIVIL,ZESTRIL) 20 MG tablet Take 1 tablet by mouth Daily. 6/24/21   Kimberly Ferguson APRN   LORazepam (ATIVAN) 0.5 MG tablet Take 1 tablet by mouth Every 8 (Eight) Hours As Needed for Anxiety. Frequency and # increased on 6/24/21. Refill early accordingly. 6/24/21   Kimberly Ferguson APRN   meclizine (ANTIVERT) 25 MG tablet Take 1 tablet by mouth 3 (Three) Times a Day As Needed for dizziness. 12/14/17   Evon Hernandez APRN   methocarbamol (Robaxin) 500 MG tablet Take 1 tablet by mouth 2 (Two) Times a Day As Needed for Muscle Spasms. 6/24/21   Kimberly Ferguson APRN   metoclopramide (REGLAN) 10 MG tablet TAKE 1 TABLET BY MOUTH FOUR TIMES A DAY AS NEEDED 5/17/21   Marcela Lawson APRN   metoprolol succinate XL (Toprol XL) 50 MG 24 hr tablet Take 1 tablet by mouth Daily. Dose increased 5/24/21 5/24/21   Kimberly Ferguson APRN   mirtazapine (Remeron) 30 MG tablet Take 1 tablet by mouth Every Night. 6/28/21   Kimberly Ferguson APRN   naloxone (NARCAN) 0.4 MG/ML injection Infuse 0.5 mL into a venous catheter Every 5 (Five) Minutes As Needed for Opioid Reversal. 3/13/21   Nick Faye MD   nitroglycerin (NITROSTAT) 0.4 MG SL tablet Place 1 tablet under the tongue Every 5 (Five) Minutes As Needed for Chest Pain. Take no more than 3 doses in 15 minutes. 4/29/21   Kimberly Ferguson APRN   NOVOLOG FLEXPEN 100 UNIT/ML solution pen-injector sc pen INJECT 60 UNITS BEFORE MEALS 3 TIMES A DAY 4/6/20   Baldemar Melendez MD   ondansetron (ZOFRAN) 8 MG tablet TAKE 1 TABLET BY MOUTH EVERY 8 (EIGHT) HOURS AS NEEDED FOR NAUSEA OR VOMITING. 1/4/21    "Ferguson, BEBE Luu   pantoprazole (PROTONIX) 20 MG EC tablet TAKE 1 TABLET BY MOUTH EVERY DAY 12/23/20   Geri Baig MD   Syringe/Needle, Disp, (Luer Lock Safety Syringes) 25G X 5/8\" 3 ML misc 1 each Daily. 1 Q28 DAYS 4/16/20   Kimberly Ferguson APRN   venlafaxine XR (EFFEXOR-XR) 150 MG 24 hr capsule TAKE 1 CAPSULE BY MOUTH TWICE A DAY 6/1/21   FergusonKimberly APRN   vitamin D (ERGOCALCIFEROL) 1.25 MG (66143 UT) capsule capsule TAKE ONE CAPSULE BY MOUTH EVERY SUNDAY. 4/20/21   Kimberly Ferguson APRN       Review of Systems:  Review of Systems   Constitutional: Negative for chills, diaphoresis, fatigue and fever.        Morbid obesity   HENT: Negative for congestion, dental problem, ear pain, facial swelling, rhinorrhea and sinus pressure.    Eyes: Negative for photophobia, discharge, redness, itching and visual disturbance.   Respiratory: Positive for cough. Negative for apnea, choking, chest tightness, shortness of breath, wheezing and stridor.    Cardiovascular: Negative for chest pain, palpitations and leg swelling.   Gastrointestinal: Positive for abdominal pain, diarrhea, nausea and vomiting. Negative for abdominal distention, anal bleeding, blood in stool and rectal pain.   Endocrine: Negative for cold intolerance, heat intolerance, polydipsia, polyphagia and polyuria.   Genitourinary: Negative for difficulty urinating, flank pain, frequency, hematuria and urgency.   Musculoskeletal: Negative for arthralgias, back pain, joint swelling and myalgias.        Recent left BKA with staples   Skin: Negative for pallor, rash and wound.   Allergic/Immunologic: Negative for environmental allergies and immunocompromised state.   Neurological: Negative for dizziness, tremors, seizures, facial asymmetry, speech difficulty, weakness, light-headedness, numbness and headaches.   Hematological: Negative for adenopathy. Does not bruise/bleed easily.   Psychiatric/Behavioral: Negative for agitation, " behavioral problems and hallucinations. The patient is not nervous/anxious.       Otherwise complete ROS is negative except as mentioned above.    Physical Exam:   Temp:  [98.3 °F (36.8 °C)] 98.3 °F (36.8 °C)  Heart Rate:  [70-91] 72  Resp:  [14-18] 14  BP: ()/(34-59) 115/57  Physical Exam  Constitutional:       General: She is not in acute distress.     Appearance: She is normal weight. She is toxic-appearing. She is not ill-appearing or diaphoretic.      Comments: Morbid obese   HENT:      Head: Normocephalic and atraumatic.      Right Ear: External ear normal.      Left Ear: External ear normal.      Nose: Nose normal.      Mouth/Throat:      Mouth: Mucous membranes are moist.      Pharynx: Oropharynx is clear.   Eyes:      Extraocular Movements: Extraocular movements intact.      Conjunctiva/sclera: Conjunctivae normal.      Pupils: Pupils are equal, round, and reactive to light.   Cardiovascular:      Rate and Rhythm: Normal rate and regular rhythm.      Heart sounds: No murmur heard.   No friction rub. No gallop.    Pulmonary:      Effort: No respiratory distress.      Breath sounds: No stridor. No wheezing or rales.   Chest:      Chest wall: No tenderness.   Abdominal:      General: Abdomen is flat. There is no distension.      Palpations: Abdomen is soft.      Tenderness: There is abdominal tenderness. There is no guarding or rebound.      Comments: Obese   Musculoskeletal:         General: No swelling or tenderness.      Cervical back: No rigidity or tenderness.      Right lower leg: No edema.      Left lower leg: No edema.      Comments: Left BKA with close wound and staples present   Lymphadenopathy:      Cervical: No cervical adenopathy.   Skin:     General: Skin is warm and dry.      Coloration: Skin is not jaundiced.      Findings: No erythema.   Neurological:      Mental Status: She is alert and oriented to person, place, and time. Mental status is at baseline.      Sensory: No sensory deficit.       Motor: No weakness.      Coordination: Coordination normal.   Psychiatric:         Mood and Affect: Mood normal.         Behavior: Behavior normal.         Judgment: Judgment normal.     IV access line: left neck central line present.      Results Reviewed:  I have personally reviewed current lab, radiology, and data and agree with results.  Lab Results (last 24 hours)     Procedure Component Value Units Date/Time    COVID-19 and FLU A/B PCR - Swab, Nasopharynx [[id number]] Collected: 07/02/21 2326    Specimen: Swab from Nasopharynx Updated: 07/02/21 2327    Extra Tubes [[id number]] Collected: 07/02/21 2202    Specimen: Blood, Venous Line Updated: 07/02/21 2315    Narrative:      The following orders were created for panel order Extra Tubes.  Procedure                               Abnormality         Status                     ---------                               -----------         ------                     Gold Top - SST[[id number]]                                   Final result                 Please view results for these tests on the individual orders.    Gold Top - SST [[id number]] Collected: 07/02/21 2202    Specimen: Blood Updated: 07/02/21 2315     Extra Tube Hold for add-ons.     Comment: Auto resulted.       Blood Culture - Blood, Blood, Central Line [[id number]] Collected: 07/02/21 2242    Specimen: Blood, Central Line Updated: 07/02/21 2245    Comprehensive Metabolic Panel [[id number]]  (Abnormal) Collected: 07/02/21 2202    Specimen: Blood Updated: 07/02/21 2230     Glucose 241 mg/dL      BUN 32 mg/dL      Creatinine 2.00 mg/dL      Sodium 133 mmol/L      Potassium 4.0 mmol/L      Chloride 95 mmol/L      CO2 25.0 mmol/L      Calcium 9.3 mg/dL      Total Protein 6.8 g/dL      Albumin 3.50 g/dL      ALT (SGPT) 19 U/L      AST (SGOT) 18 U/L      Alkaline Phosphatase 129 U/L      Total Bilirubin 0.3 mg/dL      eGFR Non African Amer 26 mL/min/1.73      Globulin 3.3 gm/dL      A/G Ratio 1.1 g/dL       BUN/Creatinine Ratio 16.0     Anion Gap 13.0 mmol/L     Narrative:      GFR Normal >60  Chronic Kidney Disease <60  Kidney Failure <15      Lipase [[id number]]  (Normal) Collected: 07/02/21 2202    Specimen: Blood Updated: 07/02/21 2230     Lipase 14 U/L     Lactic Acid, Plasma [[id number]]  (Normal) Collected: 07/02/21 2202    Specimen: Blood Updated: 07/02/21 2226     Lactate 1.5 mmol/L     CBC & Differential [[id number]]  (Abnormal) Collected: 07/02/21 2202    Specimen: Blood Updated: 07/02/21 2220    Narrative:      The following orders were created for panel order CBC & Differential.  Procedure                               Abnormality         Status                     ---------                               -----------         ------                     CBC Auto Differential[[id number]]        Abnormal            Final result                 Please view results for these tests on the individual orders.    CBC Auto Differential [[id number]]  (Abnormal) Collected: 07/02/21 2202    Specimen: Blood Updated: 07/02/21 2220     WBC 24.21 10*3/mm3      RBC 4.15 10*6/mm3      Hemoglobin 11.5 g/dL      Hematocrit 35.2 %      MCV 84.8 fL      MCH 27.7 pg      MCHC 32.7 g/dL      RDW 14.2 %      RDW-SD 43.9 fl      MPV 9.9 fL      Platelets 463 10*3/mm3      Neutrophil % 84.4 %      Lymphocyte % 7.6 %      Monocyte % 5.7 %      Eosinophil % 0.8 %      Basophil % 0.4 %      Immature Grans % 1.1 %      Neutrophils, Absolute 20.45 10*3/mm3      Lymphocytes, Absolute 1.83 10*3/mm3      Monocytes, Absolute 1.37 10*3/mm3      Eosinophils, Absolute 0.20 10*3/mm3      Basophils, Absolute 0.09 10*3/mm3      Immature Grans, Absolute 0.27 10*3/mm3      nRBC 0.0 /100 WBC     Blood Culture - Blood, Blood, Central Line [[id number]] Collected: 07/02/21 2201    Specimen: Blood, Central Line Updated: 07/02/21 2210    POC Glucose Once [[id number]]  (Abnormal) Collected: 07/02/21 1922    Specimen: Blood Updated: 07/02/21 1934     Glucose 169  mg/dL      Comment: RN NotifiedOperator: [id number] KAREN Khannaer ID: [id number]           Imaging Results (Last 24 Hours)     Procedure Component Value Units Date/Time    CT Abdomen Pelvis With Contrast [[id number]] Collected: 07/02/21 2200     Updated: 07/02/21 2255    Narrative:      CT ABDOMEN PELVIS WITH IV CONTRAST    INDICATION: 61 years Female; abdominal pain    TECHNIQUE:  CT scan of the abdomen and pelvis was performed with  IV contrast.  This exam was performed according to our  departmental dose-optimization program, which includes automated  exposure control, adjustment of the mA and/or kV according to  patient size and/or use of iterative reconstruction technique.     Comparison: 11/27/2017.    FINDINGS:  Liver: Unremarkable.  Gallbladder/Biliary tree: Status post cholecystectomy. No  significant biliary dilatation. Pneumobilia is probably within  normal limits for previous sphincterotomy.   Pancreas: Fatty atrophy of the pancreas.  Spleen: Unremarkable.  Adrenals: Unremarkable.  Genitourinary: No hydronephrosis or nephrolithiasis. No bladder  wall thickening. Status post hysterectomy. No adnexal masses.  Gastrointestinal: No gastric wall thickening. Mild  circumferential wall thickening of the transverse colon, splenic  flexure, descending colon with scattered air-fluid levels without  significant surrounding fat stranding/edema probably represents  mild infectious colitis or inflammatory bowel disease. No free  air or free fluid. Status post appendectomy.  Peritoneum: Unremarkable.  Vasculature: Mild atherosclerosis of the aorta and iliac arteries  including the mesenteric branches.  Lymph nodes: No pathologically enlarged lymph nodes.  Bones: Mild degenerative changes of the bilateral hips and lower  lumbar spine. Neurostimulator electrode terminates in the left S3  foramen.  Soft tissues: Focal areas of skin thickening with subcutaneous  edema/fat stranding in the abdominal wall, left  greater than  right, could be related to subcutaneous injections.  Incidental findings: Scattered coronary artery calcifications.  Elevation of the right hemidiaphragm of unknown etiology.       Impression:      Mild circumferential wall thickening of the transverse colon,  splenic flexure, descending colon with scattered air-fluid levels  without significant surrounding fat stranding/edema probably  represents mild infectious colitis or inflammatory bowel disease.    Electronically signed by:  Jenny Garcia  7/2/2021 10:54 PM CDT  Workstation: [id number]    XR Chest 1 View [[id number]] Collected: 07/02/21 2120     Updated: 07/02/21 2143    Narrative:      CLINICAL INDICATION: Central line placement    TECHNIQUE: Single view of the chest    COMPARISON: 7/2/2021 at 8:49 PM    FINDINGS: Previously seen right internal jugular catheter has  been removed in the interval. A new left internal jugular  catheter terminates in the superior vena cava. Elevation of the  right hemidiaphragm. Lungs are clear. No pleural effusion or  pneumothorax. Cardiomediastinal silhouette is prominent.  Degenerative changes in the spine and bilateral shoulders.  Postsurgical changes of median sternotomy.      Impression:      1.  A new left internal jugular catheter terminates in the  superior vena cava. No pneumothorax.  2.  No acute cardiopulmonary disease.  3.  Prominent cardiac silhouette.    Electronically signed by:  Maya Grace MD  7/2/2021 9:42 PM  CDT Workstation: [id number]    XR Chest 1 View [[id number]] Collected: 07/02/21 2050     Updated: 07/02/21 2117    Narrative:      CLINICAL INDICATION: s/p central line placement    TECHNIQUE: Single view of the chest    COMPARISON: 5/17/2021.    FINDINGS: Right internal jugular catheter terminates in the right  subclavian vein. Mild elevation of the right hemidiaphragm. Lung  volumes are diminished. Lungs are clear. No pleural effusion or  pneumothorax. Cardiomediastinal silhouette is  "enlarged.  Postsurgical changes of median sternotomy. Cholecystectomy clips  in the right upper quadrant.      Impression:      1.  Right internal jugular catheter terminates in the right  subclavian vein. Recommend repositioning.  2.  No acute cardiopulmonary disease.  3.  Enlarged cardiomediastinal silhouette.    Electronically signed by:  Maya Grace MD  7/2/2021 9:16 PM  CDT Workstation: [id number]            Assessment:    Active Hospital Problems    Diagnosis    • Acute colitis      # Sepsis, severe sepsis, present on admission, possible in setting of colitis  # Colitis  -CT of the abdomen and pelvis showed \"mild circumferential wall thickening of the transverse colon,  splenic flexure, descending colon with scattered air-fluid levels  without significant surrounding fat stranding/edema probably  represents mild infectious colitis or inflammatory bowel disease.\"  # Abdominal pain nausea vomiting secondary to above   # Acute renal failure in setting of sepsis  # Leukocytosis, reactive, in setting of sepsis  # Hypotension in setting of sepsis  # Diabetes mellitus type 2  # Morbid obesity  # Uncontrolled diabetes mellitus type 2 with independent, with hyperglycemia  # Anemia with no sign of active bleeding  # History of coronary artery disease with remote history of CABG# Left BKA   # Obesity with BMI of 35        Plan:  Admit to intensive care unit  Obtain stool for C. Difficile  Obtain procalcitonin level  Blood cultures was obtained in the ED  Placed on Zosyn and Flagyl pending further evaluation  Placed on IV fluid, analgesics supportive therapy  Placed on clear liquid diet  Insulin sliding scale  Her comorbidities will be treated appropriately  Avoid nephrotoxic medication as much as possible  Reconcile home medication and continue with essential home medication including aspirin, Plavix, metoprolol  Further decision making is depending on response to alcohol care  Instructed patient about necessity of " outpatient colonoscopy up in recovery no later than in 8 weeks, she denies any prior history of colonoscopy   Lifestyle modification, medically supervised weight reduction, medically supervised exercise program, low calorie diet, for treatment of her obesity, all outpatient recommended  Please refer to orders for comprehensive plan    I confirmed that the patient's Advance Care Plan is present, code status is documented, or surrogate decision maker is listed in the patient's medical record.     I have utilized all available immediate resources to obtain, update, or review the patient's current medications.     I discussed the patient's findings and my recommendations with patient and she agreed with above plan of care      Saeid Behroozi, MD   07/02/21   23:35 CDT                Electronically signed by Behroozi, Saeid, MD at 07/03/21 6898

## 2021-07-09 NOTE — PLAN OF CARE
Goal Outcome Evaluation:  Plan of Care Reviewed With: patient        Progress: improving  Outcome Summary: VVS. Pt reports only one episode of nausea throughout night. No s/s of distress noted. Will continue to monitor.

## 2021-07-09 NOTE — PROGRESS NOTES
[facility] Medicine Services  INPATIENT PROGRESS NOTE    Length of Stay: 7  Date of Admission: 7/2/2021  Primary Care Physician: Marty, BEBE Luu    Subjective   Chief Complaint: nausea, vomiting   HPI:     No vomiting overnight  Nausea today  Feeling some better     Review of Systems   Constitutional: Negative for chills, diaphoresis and fever.   HENT: Negative for sneezing and sore throat.    Eyes: Negative for pain and discharge.   Respiratory: Negative for cough and shortness of breath.    Gastrointestinal: Positive for nausea and vomiting. Negative for constipation and diarrhea.   Endocrine: Negative for cold intolerance and heat intolerance.   Genitourinary: Negative for difficulty urinating, dysuria, frequency and urgency.   Musculoskeletal: Negative for arthralgias and myalgias.   Skin: Negative for color change and pallor.   Allergic/Immunologic: Negative for environmental allergies and food allergies.   Neurological: Negative for dizziness, syncope and weakness.   Psychiatric/Behavioral: Negative for confusion and sleep disturbance.        All pertinent negatives and positives are as above. All other systems have been reviewed and are negative unless otherwise stated.     Objective    Temp:  [97.1 °F (36.2 °C)-97.7 °F (36.5 °C)] 97.2 °F (36.2 °C)  Heart Rate:  [64-87] 64  Resp:  [18] 18  BP: (143-180)/(69-84) 146/78    Physical Exam  Vitals reviewed.   Constitutional:       General: She is not in acute distress.     Appearance: She is well-developed.   HENT:      Head: Normocephalic and atraumatic.      Nose: Nose normal.   Eyes:      Conjunctiva/sclera: Conjunctivae normal.   Cardiovascular:      Rate and Rhythm: Normal rate and regular rhythm.   Pulmonary:      Effort: Pulmonary effort is normal. No respiratory distress.      Breath sounds: Normal breath sounds. No wheezing or rales.   Musculoskeletal:         General: Normal range of motion.       Cervical back: Normal range of motion and neck supple.   Skin:     General: Skin is warm and dry.   Neurological:      Mental Status: She is alert and oriented to person, place, and time.   Psychiatric:         Behavior: Behavior normal.             Results Review:  I have reviewed the labs, radiology results, and diagnostic studies.    Laboratory Data:   Results from last 7 days   Lab Units 07/09/21  0558 07/08/21  0527 07/07/21  0651 07/04/21  0845 07/03/21  0336 07/02/21  2202   SODIUM mmol/L 141 143 142 139 131* 133*   POTASSIUM mmol/L 3.4* 3.5 3.4* 3.9 4.4 4.0   CHLORIDE mmol/L 106 107 107 104 93* 95*   CO2 mmol/L 31.0* 28.0 28.0 27.0 21.0* 25.0   BUN mg/dL 2* 3* 3* 22* 34* 32*   CREATININE mg/dL 0.82 0.70 0.71 1.32* 2.11* 2.00*   GLUCOSE mg/dL 121* 107* 91 161* 392* 241*   CALCIUM mg/dL 9.3 9.4 9.3 9.7 9.3 9.3   BILIRUBIN mg/dL  --   --   --  0.3 0.4 0.3   ALK PHOS U/L  --   --   --  103 139* 129*   ALT (SGPT) U/L  --   --   --  12 18 19   AST (SGOT) U/L  --   --   --  14 16 18   ANION GAP mmol/L 4.0* 8.0 7.0 8.0 17.0* 13.0     Estimated Creatinine Clearance: 97.4 mL/min (by C-G formula based on SCr of 0.82 mg/dL).  Results from last 7 days   Lab Units 07/04/21  0845 07/03/21  0336   MAGNESIUM mg/dL 1.7 1.6         Results from last 7 days   Lab Units 07/09/21  0558 07/08/21  0527 07/07/21  0651 07/06/21  0645 07/05/21  0412   WBC 10*3/mm3 7.69 8.68 7.06 9.19 11.78*   HEMOGLOBIN g/dL 10.4* 10.4* 10.2* 10.1* 11.0*   HEMATOCRIT % 31.9* 31.8* 31.1* 31.2* 34.2   PLATELETS 10*3/mm3 351 363 325 328 323           Culture Data:   No results found for: BLOODCX  No results found for: URINECX  No results found for: RESPCX  No results found for: WOUNDCX  No results found for: STOOLCX  No components found for: BODYFLD    Radiology Data:   Imaging Results (Last 24 Hours)     ** No results found for the last 24 hours. **          I have reviewed the patient current medications.     Assessment/Plan     Active Hospital  Problems    Diagnosis  POA   • Acute colitis [K52.9]  Yes       Plan:      1. Acute Colitis  -IVF, antibiotics -- transition to oral antibiotics today   -clear liquid diet  -advance diet as tolerated  -d/c IVF when tolerating oral intake     2. Septic shock  -resolved    3. CHON  -resolved    4. DM  -continue insulin     5. Left BKA  -staples removed    6. HTN  -home lisinopril     7. Deconditioning  -PT, OT > home health     8. Hypokalemia  -monitor, replace               Discharge Planning: I expect patient to be discharged to home with therapy when tolerating an oral diet     I confirmed that the patient's Advance Care Plan is present, code status is documented, or surrogate decision maker is listed in the patient's medical record.           This document has been electronically signed by Mine Rodarte MD on July 9, 2021 10:25 CDT

## 2021-07-09 NOTE — PLAN OF CARE
Goal Outcome Evaluation:  Plan of Care Reviewed With: patient           Outcome Summary: OT treatment complete. pt was pleasant and cooperative throughout. BP elevated at beginning of session, completed in bed ADLs secondary to this. RN notified of elevated BP. pt was mod I for rolling L and R. setup/supervision for UB bathing and dressing. dependent for LB dressing. mod A for LB bathing. set up/supervision for hair washing, combing. goals set by OT this date, no goals were set by evaluating therapist. continue per OT POC.

## 2021-07-09 NOTE — THERAPY TREATMENT NOTE
Patient Name: Ariana Martinez  : 1960    MRN: [id number]                              Today's Date: 2021       Admit Date: 2021    Visit Dx:     ICD-10-CM ICD-9-CM   1. Acute colitis  K52.9 558.9   2. Acute kidney injury (CMS/HCC)  N17.9 584.9   3. Impaired physical mobility  Z74.09 781.99   4. Impaired mobility and activities of daily living  Z74.09 V49.89    Z78.9      Patient Active Problem List   Diagnosis   • 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, with long-term current use of insulin (CMS/[facility])   • Closed nondisplaced fracture of fifth left metatarsal bone   • MAURICIO (generalized anxiety disorder)   • Depression, major, recurrent, moderate (CMS/[facility])   • GERD without esophagitis   • Long term prescription opiate use   • Mixed hyperlipidemia   • Vitamin D deficiency   • Seasonal allergic rhinitis   • Restrictive lung disease secondary to obesity   • Adult body mass index 37.0-37.9   • Snoring   • Class 3 severe obesity due to excess calories without serious comorbidity with body mass index (BMI) of 40.0 to 44.9 in adult (CMS/HCC)   • (HFpEF) heart failure with preserved ejection fraction (CMS/HCC)   • Type 2 diabetes mellitus with diabetic polyneuropathy (CMS/[facility])   • Cyanocobalamin deficiency   • CAD (coronary artery disease)   • Hypertension   • Meniere's disease   • Gastroparesis   • Otitis media with effusion, left   • Pulmonary hypertension (CMS/HCC)   • Displaced fracture of fifth metatarsal bone, left foot, subsequent encounter for fracture with nonunion   • Pes cavus   • Primary osteoarthritis involving multiple joints   • Generalized anxiety disorder   • Chronic right-sided low back pain with right-sided sciatica   • Chest pain   • Thrombocytosis (CMS/HCC)   • Leukocytosis   • Iron deficiency   • Adverse effect of iron   • Hindfoot varus, acquired, left   • Chest pain due to myocardial ischemia   • Nonrheumatic tricuspid valve regurgitation   • Iron deficiency  anemia due to chronic blood loss   • Malaise and fatigue   • Nonunion of subtalar arthrodesis   • Ankle arthritis   • Cellulitis of left lower extremity   • Urinary retention   • Acute bacterial endocarditis   • Staphylococcus aureus bacteremia   • Infection due to Acinetobacter species   • Endocarditis   • Left foot infection   • Uncontrolled hypertension   • Occlusion and stenosis of bilateral carotid arteries   • S/P BKA (below knee amputation) unilateral, left (CMS/HCC)   • Phantom pain after amputation of lower extremity (CMS/HCC)   • S/P CABG (coronary artery bypass graft)   • Acute colitis     Past Medical History:   Diagnosis Date   • Angina, class IV (CMS/[facility])    • Anxiety    • Anxiety and depression    • Arthritis    • Benign paroxysmal positional vertigo    • Bladder disorder     has bladder stimulator   • Carpal tunnel syndrome    • CHF (congestive heart failure) (CMS/HCC)    • Chronic pain    • Coronary atherosclerosis     hx CABG 2005.  is followed by Dr Kwon   • Depression    • Diabetes mellitus (CMS/HCC)     Type 2, controlled   • Diabetic polyneuropathy (CMS/HCC)    • Disease of thyroid gland    • Elevated cholesterol    • Female stress incontinence    • Foot pain, left    • Full dentures    • Gastroparesis    • GERD (gastroesophageal reflux disease)    • Hyperlipidemia    • Hypertension    • Low back pain    • Malaise and fatigue    • Multiple joint pain    • Obesity     Refuses to be weighed   • Occlusion and stenosis of bilateral carotid arteries 6/18/2021   • Otalgia     Both   • Perforation of tympanic membrane     Left   • Postoperative wound infection    • Vitamin D deficiency    • Wears glasses     reading     Past Surgical History:   Procedure Laterality Date   • ABDOMINAL SURGERY     • AMPUTATION FOOT     • ANGIOPLASTY      coronary   • ANKLE ARTHROSCOPY Left 2/26/2021    Procedure: Left foot hardware removal, ankle arthroscopy, bone grafting , left foot exostectomy;  Surgeon:  Ignacio Lord DPM;  Location: [facility] OR;  Service: Podiatry;  Laterality: Left;   • BREAST BIOPSY Right    • CARDIAC CATHETERIZATION     • CARDIAC CATHETERIZATION N/A 6/20/2017    Procedure: Right Heart Cath;  Surgeon: Can Kwon MD PhD;  Location: [facility] CATH INVASIVE LOCATION;  Service:    • CARDIAC CATHETERIZATION N/A 2/18/2020    Procedure: Left Heart Cath;  Surgeon: Catalina Cooper MD;  Location: [facility] CATH INVASIVE LOCATION;  Service: Cardiology;  Laterality: N/A;   • CARPAL TUNNEL RELEASE     • CHOLECYSTECTOMY     • COLONOSCOPY N/A 6/24/2020    Procedure: COLONOSCOPY;  Surgeon: Julián Maldonado MD;  Location: [facility] ENDOSCOPY;  Service: Gastroenterology;  Laterality: N/A;   • CORONARY ARTERY BYPASS GRAFT  2005   • ENDOSCOPY N/A 10/19/2018    Procedure: ESOPHAGOGASTRODUODENOSCOPY possible dilation;  Surgeon: Julián Maldonado MD;  Location: [facility] ENDOSCOPY;  Service: Gastroenterology   • ENDOSCOPY N/A 6/24/2020    Procedure: ESOPHAGOGASTRODUODENOSCOPY WED appt please;  Surgeon: Julián Maldonado MD;  Location: [facility] ENDOSCOPY;  Service: Gastroenterology;  Laterality: N/A;   • ENDOSCOPY AND COLONOSCOPY     • FOOT SURGERY      Toes   • FOOT SURGERY     • GASTRIC BANDING      Revision, laparoscopic   • HYSTERECTOMY     • INCISION AND DRAINAGE LEG Left 3/12/2021    Procedure: Left ankle arthroscopic irrigation and debridement, screw removal;  Surgeon: Ignacio Lord DPM;  Location: [facility] OR;  Service: Podiatry;  Laterality: Left;   • MOUTH SURGERY     • SALPINGO OOPHORECTOMY     • SHOULDER SURGERY     • SUBTALAR ARTHRODESIS Left 1/16/2019    Procedure: LEFT FOOT HARDWARE REMOVAL, FIFTH METATARSAL , OPEN REDUCTION INTERNAL FIXATION, CALCANEAL OSTEOTOMY;  Surgeon: Ignacio Lord DPM;  Location: [facility] OR;  Service: Podiatry   • SUBTALAR ARTHRODESIS Left 10/16/2019    Procedure: foot hardware removal, subtalar joint fusion  possible de/reattachment of achilles tendon        (c-arm);   Surgeon: Ignacio Lord DPM;  Location: [facility];  Service: Podiatry   • SUBTALAR ARTHRODESIS Left 9/30/2020    Procedure: subtalar, talonavicular joint arthrodesis.  Removal hardware.          (c-arm);  Surgeon: Ignacio Lord DPM;  Location: [facility];  Service: Podiatry;  Laterality: Left;   • TRANSESOPHAGEAL ECHOCARDIOGRAM (LAMONTE)      With color flow     General Information     Row Name 07/09/21 0855          OT Time and Intention    Document Type  therapy note (daily note)  -ME     Mode of Treatment  individual therapy;occupational therapy  -ME     Row Name 07/09/21 0855          General Information    Patient Profile Reviewed  yes  -ME     Existing Precautions/Restrictions  fall  -ME     Row Name 07/09/21 0855          Cognition    Orientation Status (Cognition)  oriented x 4  -ME     Row Name 07/09/21 0855          Safety Issues, Functional Mobility    Safety Issues Affecting Function (Mobility)  safety precaution awareness;safety precautions follow-through/compliance  -ME     Impairments Affecting Function (Mobility)  balance;endurance/activity tolerance;strength  -ME       User Key  (r) = Recorded By, (t) = Taken By, (c) = Cosigned By    Initials Name Provider Type    ME Severiano Pettit OTR/L Occupational Therapist          Mobility/ADL's     Row Name 07/09/21 0855          Bed Mobility    Bed Mobility  rolling left;rolling right  -ME     Rolling Left [place] (Bed Mobility)  modified independence  -ME     Rolling Right [place] (Bed Mobility)  modified independence  -ME     Assistive Device (Bed Mobility)  bed rails  -ME     Comment (Bed Mobility)  pt BP elevated at beginning of session decided to keep pt in bed this morning secondary to this  -ME     Row Name 07/09/21 0855          Transfers    Comment (Transfers)  see bed mobility comment  -ME     Row Name 07/09/21 0855          Activities of Daily Living    BADL Assessment/Intervention  bathing;upper body dressing;lower body  dressing;grooming  -ME     Row Name 07/09/21 0855          Mobility    Extremity Weight-bearing Status  left lower extremity  -ME     Left Lower Extremity (Weight-bearing Status)  other (see comments) recent BKA  -ME     Row Name 07/09/21 0855          Bathing Assessment/Intervention    [place] Level (Bathing)  lower body;moderate assist (50% patient effort);upper body;set up;supervision  -ME     Position (Bathing)  long sitting  -ME     Row Name 07/09/21 0855          Upper Body Dressing Assessment/Training    [place] Level (Upper Body Dressing)  doff;don;set up;supervision hospital gown  -ME     Row Name 07/09/21 0855          Lower Body Dressing Assessment/Training    [place] Level (Lower Body Dressing)  doff;don;socks;dependent (less than 25% patient effort)  -ME     Position (Lower Body Dressing)  long sitting  -ME     Row Name 07/09/21 0855          Grooming Assessment/Training    [place] Level (Grooming)  hair care, combing/brushing;wash face, hands;set up;supervision wash hair with shower cap  -ME     Position (Grooming)  long sitting  -ME       User Key  (r) = Recorded By, (t) = Taken By, (c) = Cosigned By    Initials Name Provider Type    ME Severiano Pettit, OTR/L Occupational Therapist        Obj/Interventions    No documentation.       Goals/Plan     [facility] Name 07/09/21 0855          Transfer Goal 1 (OT)    Activity/Assistive Device (Transfer Goal 1, OT)  toilet  -ME     [place] Level/Cues Needed (Transfer Goal 1, OT)  contact guard assist  -ME     Time Frame (Transfer Goal 1, OT)  long term goal (LTG);by discharge  -ME     Progress/Outcome (Transfer Goal 1, OT)  goal not met  -ME     [facility] Name 07/09/21 0855          Bathing Goal 1 (OT)    Activity/Device (Bathing Goal 1, OT)  lower body bathing AD As needed  -ME     [place] Level/Cues Needed (Bathing Goal 1, OT)  minimum assist (75% or more patient effort)  -ME     Time Frame (Bathing Goal 1, OT)  long term goal (LTG);by  discharge  -ME     Progress/Outcomes (Bathing Goal 1, OT)  goal not met  -[facility] Name 07/09/21 0855          Dressing Goal 1 (OT)    Activity/Device (Dressing Goal 1, OT)  lower body dressing AD as needed  -ME     [place]/Cues Needed (Dressing Goal 1, OT)  minimum assist (75% or more patient effort)  -ME     Time Frame (Dressing Goal 1, OT)  long term goal (LTG);by discharge  -ME     Progress/Outcome (Dressing Goal 1, OT)  goal not met  -ME       User Key  (r) = Recorded By, (t) = Taken By, (c) = Cosigned By    Initials Name Provider Type    ME Severiano Pettit, OTR/L Occupational Therapist        Clinical Impression     [facility] Name 07/09/21 0855          Pain Assessment    Additional Documentation  Pain Scale: Numbers Pre/Post-Treatment (Group)  -ME     Row Name 07/09/21 0855          Pain Scale: Numbers Pre/Post-Treatment    Pretreatment Pain Rating  6/10  -ME     Posttreatment Pain Rating  6/10  -ME     Pain Location  abdomen  -ME     Pain Intervention(s)  Medication (See MAR);Ambulation/increased activity;Distraction  -[facility] Name 07/09/21 0855          Plan of Care Review    Plan of Care Reviewed With  patient  -ME     Row Name 07/09/21 0855          Therapy Assessment/Plan (OT)    Therapy Frequency (OT)  other (see comments) 5-7 days per week  -ME     Row Name 07/09/21 0855          Therapy Plan Review/Discharge Plan (OT)    Anticipated Discharge Disposition (OT)  home with 24/7 care;home with assist;home with home health  -[facility] Name 07/09/21 0855          Vital Signs    Pre Systolic BP Rehab  190  -ME     Pre Treatment Diastolic BP  77  -ME     Post Systolic BP Rehab  153  -ME     Post Treatment Diastolic BP  70  -ME     Pretreatment Heart Rate (beats/min)  62  -ME     Posttreatment Heart Rate (beats/min)  70  -ME     Pre SpO2 (%)  94  -ME     O2 Delivery Pre Treatment  room air  -ME     Pre Patient Position  Supine  -ME     Recovery Time  RN notified of elevated BP  -ME     Row Name 07/09/21  0855          Positioning and Restraints    Pre-Treatment Position  in bed  -ME     Post Treatment Position  bed  -ME     In Bed  notified nsg;supine;call light within reach;encouraged to call for assist;exit alarm on  -ME       User Key  (r) = Recorded By, (t) = Taken By, (c) = Cosigned By    Initials Name Provider Type    Severiano Cortes OTR/L Occupational Therapist        Outcome Measures     Row Name 07/09/21 0855          How much help from another is currently needed...    Putting on and taking off regular lower body clothing?  2  -ME     Bathing (including washing, rinsing, and drying)  2  -ME     Toileting (which includes using toilet bed pan or urinal)  2  -ME     Putting on and taking off regular upper body clothing  3  -ME     Taking care of personal grooming (such as brushing teeth)  4  -ME     Eating meals  4  -ME     AM-PAC 6 Clicks Score (OT)  17  -ME     Row Name 07/09/21 0855          Functional Assessment    Outcome Measure Options  AM-PAC 6 Clicks Daily Activity (OT)  -ME       User Key  (r) = Recorded By, (t) = Taken By, (c) = Cosigned By    Initials Name Provider Type    Severiano Cortes OTR/L Occupational Therapist        Occupational Therapy Education                 Title: PT OT SLP Therapies (In Progress)     Topic: Occupational Therapy (In Progress)     Point: ADL training (Not Started)     Description:   Instruct learner(s) on proper safety adaptation and remediation techniques during self care or transfers.   Instruct in proper use of assistive devices.              Learner Progress:  Not documented in this visit.          Point: Home exercise program (Not Started)     Description:   Instruct learner(s) on appropriate technique for monitoring, assisting and/or progressing therapeutic exercises/activities.              Learner Progress:  Not documented in this visit.          Point: Precautions (Done)     Description:   Instruct learner(s) on prescribed precautions during self-care  and functional transfers.              Learning Progress Summary           Patient Acceptance, E, VU by RB at 7/8/2021 1242    Comment: Edu pt on use of gait belt and non skid socks when OOB and no OOB without assist.                   Point: Body mechanics (Not Started)     Description:   Instruct learner(s) on proper positioning and spine alignment during self-care, functional mobility activities and/or exercises.              Learner Progress:  Not documented in this visit.                      User Key     Initials Effective Dates Name Provider Type [facility] 06/16/21 -  Fredi France, OT Occupational Therapist OT              OT Recommendation and Plan  Therapy Frequency (OT): other (see comments) (5-7 days per week)  Plan of Care Review  Plan of Care Reviewed With: patient  Outcome Summary: OT treatment complete. pt was pleasant and cooperative throughout. BP elevated at beginning of session, completed in bed ADLs secondary to this. RN notified of elevated BP. pt was mod I for rolling L and R. setup/supervision for UB bathing and dressing. dependent for LB dressing. mod A for LB bathing. set up/supervision for hair washing, combing. goals set by OT this date, no goals were set by evaluating therapist. continue per OT POC.     Time Calculation:   Time Calculation- OT     Row Name 07/09/21 0958             Time Calculation- OT    OT Start Time  0855  -ME      OT Stop Time  0949  -ME      OT Time Calculation (min)  54 min  -ME      OT Received On  07/09/21  -ME         Timed Charges    15515 - OT Self Care/Mgmt Minutes  54  -ME         Total Minutes    Timed Charges Total Minutes  54  -ME       Total Minutes  54  -ME        User Key  (r) = Recorded By, (t) = Taken By, (c) = Cosigned By    Initials Name Provider Type    ME Severiano Pettit, OTR/L Occupational Therapist        Therapy Charges for Today     Code Description Service Date Service Provider Modifiers Qty    [id number]  OT SELF CARE/MGMT/TRAIN EA  15 MIN 7/9/2021 Severiano Pettit, OTR/L GO 4               Severiano Pettit, OTR/L  7/9/2021

## 2021-07-09 NOTE — PLAN OF CARE
Goal Outcome Evaluation:  Plan of Care Reviewed With: patient        Progress: improving  Outcome Summary: pt sup<>sit with SBA, sit<>stand with CGA, pt stepped forward/backward & side stepped 4` & 8` with RW & min assist. pt would benefit from home with assistance & HHPT @ D/C

## 2021-07-09 NOTE — PLAN OF CARE
Goal Outcome Evaluation:  Plan of Care Reviewed With: caregiver, patient        Progress: improving  Outcome Summary: GI distress improving.  Diet advanced for lunch to solid food.  Meets criteria for acute malnutriiton based on wt loss and decreased oral intake.  Will monitor.

## 2021-07-09 NOTE — THERAPY TREATMENT NOTE
Acute Care - Physical Therapy Treatment Note  [facility]     Patient Name: Ariana Martinez  : 1960  MRN: [id number]  Today's Date: 2021           PT Assessment (last 12 hours)      PT Evaluation and Treatment     Row Name 21 142          Physical Therapy Time and Intention    Subjective Information  no complaints  -TA     Document Type  therapy note (daily note)  -TA     Mode of Treatment  individual therapy;physical therapy  -TA     Comment  pt's spouse in room, pt stated spouse wraps limb @ home. spouse verbalized  to wrap limb with the figure 8  wrap  -TA     Row Name 21 142          General Information    Patient Profile Reviewed  yes  -TA     Existing Precautions/Restrictions  fall  -TA     Row Name 21 142          Cognition    Affect/Mental Status (Cognitive)  WFL  -TA     Orientation Status (Cognition)  oriented x 4  -TA     Personal Safety Interventions  fall prevention program maintained;gait belt;muscle strengthening facilitated;nonskid shoes/slippers when out of bed;supervised activity  -TA     Row Name 21 142          Pain Scale: Numbers Pre/Post-Treatment    Pretreatment Pain Rating  0/10 - no pain  -TA     Posttreatment Pain Rating  0/10 - no pain  -TA     Row Name 21 142          Range of Motion Comprehensive    General Range of Motion  bilateral lower extremity ROM WFL  -TA     Row Name 21 142          Strength Comprehensive (MMT)    General Manual Muscle Testing (MMT) Assessment  other (see comments)  -TA     Row Name 21 142          Mobility    Extremity Weight-bearing Status  left lower extremity  -TA     Left Lower Extremity (Weight-bearing Status)  -- Recent BKA  -TA     Row Name 21 142          Bed Mobility    Bed Mobility  supine-sit;sit-supine;scooting/bridging  -TA     Scooting/Bridging [place] (Bed Mobility)  standby assist sitting scoot & supine bridges x 2 >HOB  -TA     Supine-Sit [place] (Bed Mobility)   standby assist  -TA     Sit-Supine [place] (Bed Mobility)  standby assist  -TA     Assistive Device (Bed Mobility)  head of bed elevated;bed rails  -TA     Row Name 07/09/21 1422          Transfers    Transfers  sit-stand transfer;stand-sit transfer  -TA     Sit-Stand [place] (Transfers)  contact guard x 3  -TA     Stand-Sit [place] (Transfers)  contact guard x 3  -TA     Row Name 07/09/21 1422          Sit-Stand Transfer    Assistive Device (Sit-Stand Transfers)  walker, front-wheeled  -TA     Row Name 07/09/21 1422          Stand-Sit Transfer    Assistive Device (Stand-Sit Transfers)  walker, front-wheeled  -TA     Row Name 07/09/21 1422          Gait/Stairs (Locomotion)    [place] Level (Gait)  minimum assist (75% patient effort);verbal cues  -TA     Assistive Device (Gait)  walker, front-wheeled  -TA     Distance in Feet (Gait)  4` & 8` stepped forward/backward & side stepped along EOB  -TA     Row Name 07/09/21 1422          Safety Issues, Functional Mobility    Impairments Affecting Function (Mobility)  strength;endurance/activity tolerance;balance  -TA     Row Name 07/09/21 1422          Hip (Therapeutic Exercise)    Hip (Therapeutic Exercise)  AROM (active range of motion);isometric exercises  -TA     Hip AROM (Therapeutic Exercise)  left;extension;aBduction;aDduction;sidelying;10 repetitions  -TA     Hip Isometrics (Therapeutic Exercise)  bilateral;gluteal sets;supine;10 repetitions  -TA     Row Name 07/09/21 1422          Knee (Therapeutic Exercise)    Knee (Therapeutic Exercise)  AROM (active range of motion);isometric exercises  -TA     Knee Isometrics (Therapeutic Exercise)  bilateral;quad sets;supine;10 second hold  -TA     Row Name 07/09/21 1422          Ankle (Therapeutic Exercise)    Ankle (Therapeutic Exercise)  AROM (active range of motion)  -TA     Ankle AROM (Therapeutic Exercise)  right;supine;dorsiflexion;plantarflexion;10 repetitions;5 repetitions  -     Row Name              Wound 07/02/21 0045 Left distal leg Incision    Wound - Properties Group Placement Date: 07/02/21 -TH Placement Time: 0045 -TH Present on Hospital Admission: Y  -TH Side: Left  -TH Orientation: distal  -TH Location: leg  -TH Primary Wound Type: Incision  -TH, staples     Retired Wound - Properties Group Date first assessed: 07/02/21 -TH Time first assessed: 0045 -TH Present on Hospital Admission: Y  -TH Side: Left  -TH Location: leg  -TH Primary Wound Type: Incision  -TH, staples     Row Name 07/09/21 1422          Plan of Care Review    Plan of Care Reviewed With  patient  -TA     Progress  improving  -TA     Outcome Summary  pt sup<>sit with SBA, sit<>stand with CGA, pt stepped forward/backward & side stepped 4` & 8` with RW & min assist. pt would benefit from home with assistance & HHPT @ D/C   -TA     Row Name 07/09/21 1422          Vital Signs    Pre Systolic BP Rehab  119  -TA     Pre Treatment Diastolic BP  58  -TA     Post Systolic BP Rehab  158  -TA     Post Treatment Diastolic BP  70  -TA     Pretreatment Heart Rate (beats/min)  70  -TA     Intratreatment Heart Rate (beats/min)  97  -TA     Posttreatment Heart Rate (beats/min)  77  -TA     Pre SpO2 (%)  96  -TA     O2 Delivery Pre Treatment  room air  -TA     Intra SpO2 (%)  95  -TA     O2 Delivery Intra Treatment  room air  -TA     Post SpO2 (%)  97  -TA     O2 Delivery Post Treatment  room air  -TA     Pre Patient Position  Supine  -TA     Post Patient Position  Supine  -TA     Row Name 07/09/21 1422          Bed Mobility Goal 1 (PT)    Activity/Assistive Device (Bed Mobility Goal 1, PT)  sit to supine/supine to sit  -TA     [place] Level/Cues Needed (Bed Mobility Goal 1, PT)  independent  -TA     Time Frame (Bed Mobility Goal 1, PT)  by discharge  -TA     Strategies/Barriers (Bed Mobility Goal 1, PT)  HOB flat, no bed rails.  -TA     Progress/Outcomes (Bed Mobility Goal 1, PT)  goal not met  -TA     Row Name 07/09/21 142           Transfer Goal 1 (PT)    Activity/Assistive Device (Transfer Goal 1, PT)  sit-to-stand/stand-to-sit;bed-to-chair/chair-to-bed;walker, rolling  -TA     [place] Level/Cues Needed (Transfer Goal 1, PT)  supervision required  -TA     Time Frame (Transfer Goal 1, PT)  by discharge  -TA     Strategies/Barriers (Transfers Goal 1, PT)  L BKA.  -TA     Progress/Outcome (Transfer Goal 1, PT)  goal not met  -TA     Row Name 07/09/21 1422          Gait Training Goal 1 (PT)    Activity/Assistive Device (Gait Training Goal 1, PT)  walker, rolling  -TA     [place] Level (Gait Training Goal 1, PT)  contact guard assist  -TA     Distance (Gait Training Goal 1, PT)  10' x 1.  -TA     Time Frame (Gait Training Goal 1, PT)  by discharge  -TA     Strategies/Barriers (Gait Training Goal 1, PT)  L BKA, recent fall during ambulation.  -TA     Progress/Outcome (Gait Training Goal 1, PT)  goal not met  -TA     Row Name 07/09/21 1422          ROM Goal 1 (PT)    ROM Goal 1 (PT)  Patient will re-wrap L BKA in figure-8 pattern, each shift, with ace wrap.  -TA     Time Frame (ROM Goal 1, PT)  by discharge  -TA     Strategies/Barriers (ROM Goal 1, PT)  L BKA.  -TA     Progress/Outcome (ROM Goal 1, PT)  goal not met  -TA     Row Name 07/09/21 1422          Positioning and Restraints    Pre-Treatment Position  in bed  -TA     Post Treatment Position  bed  -TA     In Bed  supine;call light within reach;exit alarm on  -TA     Row Name 07/09/21 1422          Therapy Assessment/Plan (PT)    Rehab Potential (PT)  good, to achieve stated therapy goals  -TA     Criteria for Skilled Interventions Met (PT)  yes;skilled treatment is necessary  -TA     Comment, Therapy Assessment/Plan (PT)  continue  -TA       User Key  (r) = Recorded By, (t) = Taken By, (c) = Cosigned By    Initials Name Provider Type    Robert Long, RN Registered Nurse    Rosalia Ambrocio PTA Physical Therapy Assistant        Physical Therapy Education                  Title: PT OT SLP Therapies (In Progress)     Topic: Physical Therapy (In Progress)     Point: Mobility training (Done)     Learning Progress Summary           Patient Acceptance, E, VU,NR by RAYMOND at 7/7/2021 1414    Comment: Educated on proper acewrapping technique for L BKA, proper use of walker, proper hand placement with transfers.                   Point: Home exercise program (Not Started)     Learner Progress:  Not documented in this visit.          Point: Body mechanics (Done)     Learning Progress Summary           Patient Acceptance, E,TB, VU by MB at 7/8/2021 0927                   Point: Precautions (Not Started)     Learner Progress:  Not documented in this visit.                      User Key     Initials Effective Dates Name Provider Type Discipline    MB 06/16/21 -  Devora Presley RN Registered Nurse Nurse    RAYMOND 06/16/21 -  Carlito Montoya, PT Physical Therapist PT              PT Recommendation and Plan  Anticipated Discharge Disposition (PT): home with home health  Therapy Frequency (PT): daily  Plan of Care Reviewed With: patient  Progress: improving  Outcome Summary: pt sup<>sit with SBA, sit<>stand with CGA, pt stepped forward/backward & side stepped 4` & 8` with RW & min assist. pt would benefit from home with assistance & HHPT @ D/C   Outcome Measures     Row Name 07/09/21 1600 07/08/21 1600 07/08/21 0855       How much help from another person do you currently need...    Turning from your back to your side while in flat bed without using bedrails?  3  -TA  3  -TA  --    Moving from lying on back to sitting on the side of a flat bed without bedrails?  3  -TA  3  -TA  --    Moving to and from a bed to a chair (including a wheelchair)?  3  -TA  3  -TA  --    Standing up from a chair using your arms (e.g., wheelchair, bedside chair)?  3  -TA  3  -TA  --    Climbing 3-5 steps with a railing?  1  -TA  1  -TA  --    To walk in hospital room?  3  -TA  3  -TA  --    AM-PAC 6 Clicks Score (PT)  16   -TA  16  -TA  --       How much help from another is currently needed...    Putting on and taking off regular lower body clothing?  --  --  2  -RB    Bathing (including washing, rinsing, and drying)  --  --  2  -RB    Toileting (which includes using toilet bed pan or urinal)  --  --  2  -RB    Putting on and taking off regular upper body clothing  --  --  3  -RB    Taking care of personal grooming (such as brushing teeth)  --  --  3  -RB    Eating meals  --  --  4  -RB    AM-PAC 6 Clicks Score (OT)  --  --  16  -RB       Functional Assessment    Outcome Measure Options  AM-PAC 6 Clicks Basic Mobility (PT)  -TA  AM-PAC 6 Clicks Basic Mobility (PT)  -TA  AM-PAC 6 Clicks Daily Activity (OT)  -RB      User Key  (r) = Recorded By, (t) = Taken By, (c) = Cosigned By    Initials Name Provider Type    RB Fredi France OT Occupational Therapist    Rosalia Ambrocio PTA Physical Therapy Assistant           Time Calculation:   PT Charges     Row Name 07/09/21 1621             Time Calculation    Start Time  1326  -TA      Stop Time  1422  -TA      Time Calculation (min)  56 min  -TA      PT Received On  07/09/21  -TA         Time Calculation- PT    Total Timed Code Minutes- PT  56 minute(s)  -TA         Timed Charges    89846 - PT Therapeutic Exercise Minutes  15  -TA      59535 - Gait Training Minutes   11  -TA      35797 - PT Therapeutic Activity Minutes  30  -TA         Total Minutes    Timed Charges Total Minutes  56  -TA       Total Minutes  56  -TA        User Key  (r) = Recorded By, (t) = Taken By, (c) = Cosigned By    Initials Name Provider Type    Rosalia Ambrocio PTA Physical Therapy Assistant        Therapy Charges for Today     Code Description Service Date Service Provider Modifiers Qty    42293450650 HC PT THERAPEUTIC ACT EA 15 MIN 7/8/2021 Rosalia King, MANJINDER GP 3    31990612133 HC GAIT TRAINING EA 15 MIN 7/9/2021 Rosalia King, MANJINDER GP 1    05480771187 HC PT THER PROC EA 15 MIN 7/9/2021 Rosalia King  PTA GP 1    88122333580 HC PT THERAPEUTIC ACT EA 15 MIN 7/9/2021 Rosalia King, PTA GP 2          PT G-Codes  Outcome Measure Options: AM-PAC 6 Clicks Basic Mobility (PT)  AM-PAC 6 Clicks Score (PT): 16  AM-PAC 6 Clicks Score (OT): 17    Rosalia King PTA  7/9/2021

## 2021-07-10 PROBLEM — E43 SEVERE MALNUTRITION: Status: ACTIVE | Noted: 2021-07-10

## 2021-07-10 LAB
ANION GAP SERPL CALCULATED.3IONS-SCNC: 6 MMOL/L (ref 5–15)
BASOPHILS # BLD AUTO: 0.04 10*3/MM3 (ref 0–0.2)
BASOPHILS NFR BLD AUTO: 0.5 % (ref 0–1.5)
BUN SERPL-MCNC: 5 MG/DL (ref 6–20)
BUN/CREAT SERPL: 5.7 (ref 7–25)
CALCIUM SPEC-SCNC: 9.2 MG/DL (ref 8.6–10.5)
CHLORIDE SERPL-SCNC: 105 MMOL/L (ref 98–107)
CO2 SERPL-SCNC: 30 MMOL/L (ref 22–29)
CREAT SERPL-MCNC: 0.87 MG/DL (ref 0.57–1)
DEPRECATED RDW RBC AUTO: 45.2 FL (ref 37–54)
EOSINOPHIL # BLD AUTO: 0.55 10*3/MM3 (ref 0–0.4)
EOSINOPHIL NFR BLD AUTO: 6.3 % (ref 0.3–6.2)
ERYTHROCYTE [DISTWIDTH] IN BLOOD BY AUTOMATED COUNT: 14.5 % (ref 12.3–15.4)
GFR SERPL CREATININE-BSD FRML MDRD: 67 ML/MIN/1.73
GLUCOSE BLDC GLUCOMTR-MCNC: 130 MG/DL (ref 70–130)
GLUCOSE BLDC GLUCOMTR-MCNC: 144 MG/DL (ref 70–130)
GLUCOSE BLDC GLUCOMTR-MCNC: 196 MG/DL (ref 70–130)
GLUCOSE SERPL-MCNC: 181 MG/DL (ref 65–99)
HCT VFR BLD AUTO: 30.5 % (ref 34–46.6)
HGB BLD-MCNC: 10 G/DL (ref 12–15.9)
IMM GRANULOCYTES # BLD AUTO: 0.03 10*3/MM3 (ref 0–0.05)
IMM GRANULOCYTES NFR BLD AUTO: 0.3 % (ref 0–0.5)
LYMPHOCYTES # BLD AUTO: 2.01 10*3/MM3 (ref 0.7–3.1)
LYMPHOCYTES NFR BLD AUTO: 23.1 % (ref 19.6–45.3)
MCH RBC QN AUTO: 28.4 PG (ref 26.6–33)
MCHC RBC AUTO-ENTMCNC: 32.8 G/DL (ref 31.5–35.7)
MCV RBC AUTO: 86.6 FL (ref 79–97)
MONOCYTES # BLD AUTO: 0.62 10*3/MM3 (ref 0.1–0.9)
MONOCYTES NFR BLD AUTO: 7.1 % (ref 5–12)
NEUTROPHILS NFR BLD AUTO: 5.47 10*3/MM3 (ref 1.7–7)
NEUTROPHILS NFR BLD AUTO: 62.7 % (ref 42.7–76)
NRBC BLD AUTO-RTO: 0 /100 WBC (ref 0–0.2)
PLATELET # BLD AUTO: 337 10*3/MM3 (ref 140–450)
PMV BLD AUTO: 9.2 FL (ref 6–12)
POTASSIUM SERPL-SCNC: 3.9 MMOL/L (ref 3.5–5.2)
RBC # BLD AUTO: 3.52 10*6/MM3 (ref 3.77–5.28)
SODIUM SERPL-SCNC: 141 MMOL/L (ref 136–145)
WBC # BLD AUTO: 8.72 10*3/MM3 (ref 3.4–10.8)

## 2021-07-10 PROCEDURE — 80048 BASIC METABOLIC PNL TOTAL CA: CPT | Performed by: INTERNAL MEDICINE

## 2021-07-10 PROCEDURE — 25010000002 HYDRALAZINE PER 20 MG: Performed by: STUDENT IN AN ORGANIZED HEALTH CARE EDUCATION/TRAINING PROGRAM

## 2021-07-10 PROCEDURE — 25010000002 PROCHLORPERAZINE 10 MG/2ML SOLUTION: Performed by: INTERNAL MEDICINE

## 2021-07-10 PROCEDURE — 25010000002 ONDANSETRON PER 1 MG: Performed by: INTERNAL MEDICINE

## 2021-07-10 PROCEDURE — 82962 GLUCOSE BLOOD TEST: CPT

## 2021-07-10 PROCEDURE — 85025 COMPLETE CBC W/AUTO DIFF WBC: CPT | Performed by: INTERNAL MEDICINE

## 2021-07-10 PROCEDURE — 25010000002 ENOXAPARIN PER 10 MG: Performed by: INTERNAL MEDICINE

## 2021-07-10 PROCEDURE — 63710000001 INSULIN DETEMIR PER 5 UNITS: Performed by: INTERNAL MEDICINE

## 2021-07-10 PROCEDURE — 63710000001 INSULIN ASPART PER 5 UNITS: Performed by: HOSPITALIST

## 2021-07-10 RX ORDER — HYDRALAZINE HYDROCHLORIDE 20 MG/ML
10 INJECTION INTRAMUSCULAR; INTRAVENOUS ONCE
Status: COMPLETED | OUTPATIENT
Start: 2021-07-10 | End: 2021-07-10

## 2021-07-10 RX ORDER — HYDROCODONE BITARTRATE AND ACETAMINOPHEN 5; 325 MG/1; MG/1
1 TABLET ORAL EVERY 6 HOURS PRN
Status: DISCONTINUED | OUTPATIENT
Start: 2021-07-10 | End: 2021-07-12 | Stop reason: HOSPADM

## 2021-07-10 RX ORDER — HYDRALAZINE HYDROCHLORIDE 20 MG/ML
10 INJECTION INTRAMUSCULAR; INTRAVENOUS EVERY 6 HOURS PRN
Status: DISCONTINUED | OUTPATIENT
Start: 2021-07-10 | End: 2021-07-12 | Stop reason: HOSPADM

## 2021-07-10 RX ORDER — METOPROLOL SUCCINATE 50 MG/1
50 TABLET, EXTENDED RELEASE ORAL
Status: DISCONTINUED | OUTPATIENT
Start: 2021-07-10 | End: 2021-07-12 | Stop reason: HOSPADM

## 2021-07-10 RX ORDER — GABAPENTIN 400 MG/1
400 CAPSULE ORAL 3 TIMES DAILY
Status: DISCONTINUED | OUTPATIENT
Start: 2021-07-10 | End: 2021-07-12 | Stop reason: HOSPADM

## 2021-07-10 RX ORDER — HYDROCHLOROTHIAZIDE 25 MG/1
25 TABLET ORAL DAILY
Status: DISCONTINUED | OUTPATIENT
Start: 2021-07-10 | End: 2021-07-12 | Stop reason: HOSPADM

## 2021-07-10 RX ADMIN — FOLIC ACID 1 MG: 1 TABLET ORAL at 08:08

## 2021-07-10 RX ADMIN — INSULIN ASPART 3 UNITS: 100 INJECTION, SOLUTION INTRAVENOUS; SUBCUTANEOUS at 11:07

## 2021-07-10 RX ADMIN — INSULIN DETEMIR 40 UNITS: 100 INJECTION, SOLUTION SUBCUTANEOUS at 08:08

## 2021-07-10 RX ADMIN — HYDRALAZINE HYDROCHLORIDE 10 MG: 20 INJECTION INTRAMUSCULAR; INTRAVENOUS at 16:12

## 2021-07-10 RX ADMIN — SODIUM CHLORIDE, POTASSIUM CHLORIDE, SODIUM LACTATE AND CALCIUM CHLORIDE 50 ML/HR: 600; 310; 30; 20 INJECTION, SOLUTION INTRAVENOUS at 12:11

## 2021-07-10 RX ADMIN — PROCHLORPERAZINE EDISYLATE 5 MG: 5 INJECTION INTRAMUSCULAR; INTRAVENOUS at 18:09

## 2021-07-10 RX ADMIN — METRONIDAZOLE 500 MG: 500 TABLET ORAL at 13:04

## 2021-07-10 RX ADMIN — CLOPIDOGREL BISULFATE 75 MG: 75 TABLET ORAL at 08:08

## 2021-07-10 RX ADMIN — HYDROCHLOROTHIAZIDE 25 MG: 25 TABLET ORAL at 11:07

## 2021-07-10 RX ADMIN — SODIUM CHLORIDE, PRESERVATIVE FREE 10 ML: 5 INJECTION INTRAVENOUS at 08:14

## 2021-07-10 RX ADMIN — ASPIRIN 81 MG: 81 TABLET, FILM COATED ORAL at 08:08

## 2021-07-10 RX ADMIN — ENOXAPARIN SODIUM 40 MG: 40 INJECTION SUBCUTANEOUS at 08:10

## 2021-07-10 RX ADMIN — GABAPENTIN 300 MG: 300 CAPSULE ORAL at 06:18

## 2021-07-10 RX ADMIN — METRONIDAZOLE 500 MG: 500 TABLET ORAL at 06:18

## 2021-07-10 RX ADMIN — DOCOSANOL: 100 CREAM TOPICAL at 11:08

## 2021-07-10 RX ADMIN — VENLAFAXINE HYDROCHLORIDE 150 MG: 75 CAPSULE, EXTENDED RELEASE ORAL at 08:08

## 2021-07-10 RX ADMIN — INSULIN ASPART 3 UNITS: 100 INJECTION, SOLUTION INTRAVENOUS; SUBCUTANEOUS at 08:13

## 2021-07-10 RX ADMIN — LEVOFLOXACIN 500 MG: 500 TABLET, FILM COATED ORAL at 11:08

## 2021-07-10 RX ADMIN — ATORVASTATIN CALCIUM 80 MG: 40 TABLET, FILM COATED ORAL at 08:08

## 2021-07-10 RX ADMIN — GABAPENTIN 400 MG: 400 CAPSULE ORAL at 15:04

## 2021-07-10 RX ADMIN — SODIUM CHLORIDE, POTASSIUM CHLORIDE, SODIUM LACTATE AND CALCIUM CHLORIDE 100 ML/HR: 600; 310; 30; 20 INJECTION, SOLUTION INTRAVENOUS at 02:26

## 2021-07-10 RX ADMIN — HYDROCODONE BITARTRATE AND ACETAMINOPHEN 1 TABLET: 5; 325 TABLET ORAL at 16:32

## 2021-07-10 RX ADMIN — SODIUM CHLORIDE, PRESERVATIVE FREE 10 ML: 5 INJECTION INTRAVENOUS at 21:11

## 2021-07-10 RX ADMIN — ACETAMINOPHEN 650 MG: 325 TABLET, FILM COATED ORAL at 06:11

## 2021-07-10 RX ADMIN — ONDANSETRON HYDROCHLORIDE 4 MG: 2 INJECTION, SOLUTION INTRAMUSCULAR; INTRAVENOUS at 15:25

## 2021-07-10 RX ADMIN — SODIUM CHLORIDE, PRESERVATIVE FREE 10 ML: 5 INJECTION INTRAVENOUS at 21:12

## 2021-07-10 RX ADMIN — METRONIDAZOLE 500 MG: 500 TABLET ORAL at 21:10

## 2021-07-10 RX ADMIN — ACETAMINOPHEN 650 MG: 325 TABLET, FILM COATED ORAL at 13:28

## 2021-07-10 RX ADMIN — DOCOSANOL: 100 CREAM TOPICAL at 17:27

## 2021-07-10 RX ADMIN — SODIUM CHLORIDE, PRESERVATIVE FREE 10 ML: 5 INJECTION INTRAVENOUS at 21:10

## 2021-07-10 RX ADMIN — DOCOSANOL: 100 CREAM TOPICAL at 06:11

## 2021-07-10 RX ADMIN — LISINOPRIL 20 MG: 20 TABLET ORAL at 08:08

## 2021-07-10 RX ADMIN — ARIPIPRAZOLE 15 MG: 15 TABLET ORAL at 08:08

## 2021-07-10 RX ADMIN — GABAPENTIN 400 MG: 400 CAPSULE ORAL at 21:10

## 2021-07-10 RX ADMIN — DOCOSANOL: 100 CREAM TOPICAL at 13:04

## 2021-07-10 RX ADMIN — ONDANSETRON HYDROCHLORIDE 4 MG: 2 INJECTION, SOLUTION INTRAMUSCULAR; INTRAVENOUS at 21:39

## 2021-07-10 RX ADMIN — HYDRALAZINE HYDROCHLORIDE 10 MG: 20 INJECTION INTRAMUSCULAR; INTRAVENOUS at 16:31

## 2021-07-10 RX ADMIN — METOPROLOL SUCCINATE 50 MG: 50 TABLET, EXTENDED RELEASE ORAL at 15:24

## 2021-07-10 RX ADMIN — DOCOSANOL: 100 CREAM TOPICAL at 21:16

## 2021-07-10 RX ADMIN — PROCHLORPERAZINE EDISYLATE 5 MG: 5 INJECTION INTRAMUSCULAR; INTRAVENOUS at 06:11

## 2021-07-10 RX ADMIN — FAMOTIDINE 20 MG: 10 INJECTION INTRAVENOUS at 08:11

## 2021-07-10 RX ADMIN — TAMSULOSIN HYDROCHLORIDE 0.4 MG: 0.4 CAPSULE ORAL at 08:08

## 2021-07-10 NOTE — PROGRESS NOTES
[facility] Medicine Services  INPATIENT PROGRESS NOTE    Length of Stay: 8  Date of Admission: 7/2/2021  Primary Care Physician: Marty, BEBE Luu    Subjective   Chief Complaint: nausea, vomiting   HPI:      Tolerating some of a diet  Still having nausea and diarrhea   Review of Systems   Constitutional: Negative for chills, diaphoresis and fever.   HENT: Negative for sneezing and sore throat.    Eyes: Negative for pain and discharge.   Respiratory: Negative for cough and shortness of breath.    Gastrointestinal: Positive for nausea and vomiting. Negative for constipation and diarrhea.   Endocrine: Negative for cold intolerance and heat intolerance.   Genitourinary: Negative for difficulty urinating, dysuria, frequency and urgency.   Musculoskeletal: Negative for arthralgias and myalgias.   Skin: Negative for color change and pallor.   Allergic/Immunologic: Negative for environmental allergies and food allergies.   Neurological: Negative for dizziness, syncope and weakness.   Psychiatric/Behavioral: Negative for confusion and sleep disturbance.        All pertinent negatives and positives are as above. All other systems have been reviewed and are negative unless otherwise stated.     Objective    Temp:  [96.7 °F (35.9 °C)-98.1 °F (36.7 °C)] 96.7 °F (35.9 °C)  Heart Rate:  [65-83] 83  Resp:  [18-20] 18  BP: (150-192)/(60-81) 162/71    Physical Exam  Vitals reviewed.   Constitutional:       General: She is not in acute distress.     Appearance: She is well-developed.   HENT:      Head: Normocephalic and atraumatic.      Nose: Nose normal.   Eyes:      Conjunctiva/sclera: Conjunctivae normal.   Cardiovascular:      Rate and Rhythm: Normal rate and regular rhythm.   Pulmonary:      Effort: Pulmonary effort is normal. No respiratory distress.      Breath sounds: Normal breath sounds. No wheezing or rales.   Musculoskeletal:         General: Normal range of motion.       Cervical back: Normal range of motion and neck supple.   Skin:     General: Skin is warm and dry.   Neurological:      Mental Status: She is alert and oriented to person, place, and time.   Psychiatric:         Behavior: Behavior normal.             Results Review:  I have reviewed the labs, radiology results, and diagnostic studies.    Laboratory Data:   Results from last 7 days   Lab Units 07/10/21  0545 07/09/21 2022 07/09/21 0558 07/08/21  0527 07/04/21  0845   SODIUM mmol/L 141  --  141 143 139   POTASSIUM mmol/L 3.9 4.4 3.4* 3.5 3.9   CHLORIDE mmol/L 105  --  106 107 104   CO2 mmol/L 30.0*  --  31.0* 28.0 27.0   BUN mg/dL 5*  --  2* 3* 22*   CREATININE mg/dL 0.87  --  0.82 0.70 1.32*   GLUCOSE mg/dL 181*  --  121* 107* 161*   CALCIUM mg/dL 9.2  --  9.3 9.4 9.7   BILIRUBIN mg/dL  --   --   --   --  0.3   ALK PHOS U/L  --   --   --   --  103   ALT (SGPT) U/L  --   --   --   --  12   AST (SGOT) U/L  --   --   --   --  14   ANION GAP mmol/L 6.0  --  4.0* 8.0 8.0     Estimated Creatinine Clearance: 92.7 mL/min (by C-G formula based on SCr of 0.87 mg/dL).  Results from last 7 days   Lab Units 07/04/21  0845   MAGNESIUM mg/dL 1.7         Results from last 7 days   Lab Units 07/10/21  0545 07/09/21 0558 07/08/21 0527 07/07/21  0651 07/06/21  0645   WBC 10*3/mm3 8.72 7.69 8.68 7.06 9.19   HEMOGLOBIN g/dL 10.0* 10.4* 10.4* 10.2* 10.1*   HEMATOCRIT % 30.5* 31.9* 31.8* 31.1* 31.2*   PLATELETS 10*3/mm3 337 351 363 325 328           Culture Data:   No results found for: BLOODCX  No results found for: URINECX  No results found for: RESPCX  No results found for: WOUNDCX  No results found for: STOOLCX  No components found for: BODYFLD    Radiology Data:   Imaging Results (Last 24 Hours)     ** No results found for the last 24 hours. **          I have reviewed the patient current medications.     Assessment/Plan     Active Hospital Problems    Diagnosis  POA   • Acute colitis [K52.9]  Yes       Plan:      1. Acute  Colitis  -IVF, antibiotics   -decrease IVF   -diet as tolerated     2. Septic shock  -resolved    3. CHON  -resolved    4. DM  -continue insulin     5. Left BKA  -staples removed    6. HTN  -home lisinopril   -restart HCTZ     7. Deconditioning  -PT, OT > home health     8. Hypokalemia  -monitor, replace               Discharge Planning: I expect patient to be discharged to home with therapy tomorrow     I confirmed that the patient's Advance Care Plan is present, code status is documented, or surrogate decision maker is listed in the patient's medical record.           This document has been electronically signed by Mine Rodarte MD on July 10, 2021 10:28 CDT

## 2021-07-10 NOTE — SIGNIFICANT NOTE
07/10/21 1617   OTHER   Discipline physical therapy assistant   Rehab Time/Intention   Session Not Performed other (see comments)  (checked on pt x 3-eating lunch,bp 172/100 with ha and 3rd attempt nsg states bp still elevated and pt nauseated from ha-nsg checking on prn meds)

## 2021-07-10 NOTE — PLAN OF CARE
Goal Outcome Evaluation:              Outcome Summary: Still with intermittent nausea. Controlled well with PRNs. Cont to monitor

## 2021-07-11 LAB
ANION GAP SERPL CALCULATED.3IONS-SCNC: 9 MMOL/L (ref 5–15)
BASOPHILS # BLD AUTO: 0.04 10*3/MM3 (ref 0–0.2)
BASOPHILS NFR BLD AUTO: 0.4 % (ref 0–1.5)
BUN SERPL-MCNC: 8 MG/DL (ref 6–20)
BUN/CREAT SERPL: 9 (ref 7–25)
CALCIUM SPEC-SCNC: 8.9 MG/DL (ref 8.6–10.5)
CHLORIDE SERPL-SCNC: 104 MMOL/L (ref 98–107)
CO2 SERPL-SCNC: 27 MMOL/L (ref 22–29)
CREAT SERPL-MCNC: 0.89 MG/DL (ref 0.57–1)
DEPRECATED RDW RBC AUTO: 46 FL (ref 37–54)
EOSINOPHIL # BLD AUTO: 0.55 10*3/MM3 (ref 0–0.4)
EOSINOPHIL NFR BLD AUTO: 5.1 % (ref 0.3–6.2)
ERYTHROCYTE [DISTWIDTH] IN BLOOD BY AUTOMATED COUNT: 14.6 % (ref 12.3–15.4)
GFR SERPL CREATININE-BSD FRML MDRD: 65 ML/MIN/1.73
GLUCOSE BLDC GLUCOMTR-MCNC: 107 MG/DL (ref 70–130)
GLUCOSE BLDC GLUCOMTR-MCNC: 160 MG/DL (ref 70–130)
GLUCOSE BLDC GLUCOMTR-MCNC: 166 MG/DL (ref 70–130)
GLUCOSE BLDC GLUCOMTR-MCNC: 176 MG/DL (ref 70–130)
GLUCOSE BLDC GLUCOMTR-MCNC: 188 MG/DL (ref 70–130)
GLUCOSE SERPL-MCNC: 96 MG/DL (ref 65–99)
HCT VFR BLD AUTO: 35 % (ref 34–46.6)
HGB BLD-MCNC: 11.3 G/DL (ref 12–15.9)
IMM GRANULOCYTES # BLD AUTO: 0.1 10*3/MM3 (ref 0–0.05)
IMM GRANULOCYTES NFR BLD AUTO: 0.9 % (ref 0–0.5)
LYMPHOCYTES # BLD AUTO: 2.79 10*3/MM3 (ref 0.7–3.1)
LYMPHOCYTES NFR BLD AUTO: 26 % (ref 19.6–45.3)
MCH RBC QN AUTO: 28.6 PG (ref 26.6–33)
MCHC RBC AUTO-ENTMCNC: 32.3 G/DL (ref 31.5–35.7)
MCV RBC AUTO: 88.6 FL (ref 79–97)
MONOCYTES # BLD AUTO: 0.84 10*3/MM3 (ref 0.1–0.9)
MONOCYTES NFR BLD AUTO: 7.8 % (ref 5–12)
NEUTROPHILS NFR BLD AUTO: 59.8 % (ref 42.7–76)
NEUTROPHILS NFR BLD AUTO: 6.41 10*3/MM3 (ref 1.7–7)
NRBC BLD AUTO-RTO: 0 /100 WBC (ref 0–0.2)
PLATELET # BLD AUTO: 307 10*3/MM3 (ref 140–450)
PMV BLD AUTO: 10.1 FL (ref 6–12)
POTASSIUM SERPL-SCNC: 3.6 MMOL/L (ref 3.5–5.2)
POTASSIUM SERPL-SCNC: 4.3 MMOL/L (ref 3.5–5.2)
RBC # BLD AUTO: 3.95 10*6/MM3 (ref 3.77–5.28)
SODIUM SERPL-SCNC: 140 MMOL/L (ref 136–145)
WBC # BLD AUTO: 10.73 10*3/MM3 (ref 3.4–10.8)

## 2021-07-11 PROCEDURE — 82962 GLUCOSE BLOOD TEST: CPT

## 2021-07-11 PROCEDURE — 84132 ASSAY OF SERUM POTASSIUM: CPT | Performed by: STUDENT IN AN ORGANIZED HEALTH CARE EDUCATION/TRAINING PROGRAM

## 2021-07-11 PROCEDURE — 80048 BASIC METABOLIC PNL TOTAL CA: CPT | Performed by: INTERNAL MEDICINE

## 2021-07-11 PROCEDURE — 97110 THERAPEUTIC EXERCISES: CPT

## 2021-07-11 PROCEDURE — 85025 COMPLETE CBC W/AUTO DIFF WBC: CPT | Performed by: INTERNAL MEDICINE

## 2021-07-11 PROCEDURE — 63710000001 INSULIN ASPART PER 5 UNITS: Performed by: HOSPITALIST

## 2021-07-11 PROCEDURE — 25010000002 ONDANSETRON PER 1 MG: Performed by: INTERNAL MEDICINE

## 2021-07-11 PROCEDURE — 63710000001 INSULIN DETEMIR PER 5 UNITS: Performed by: INTERNAL MEDICINE

## 2021-07-11 PROCEDURE — 25010000002 HYDRALAZINE PER 20 MG: Performed by: STUDENT IN AN ORGANIZED HEALTH CARE EDUCATION/TRAINING PROGRAM

## 2021-07-11 PROCEDURE — 97535 SELF CARE MNGMENT TRAINING: CPT

## 2021-07-11 PROCEDURE — 25010000002 PROCHLORPERAZINE 10 MG/2ML SOLUTION: Performed by: INTERNAL MEDICINE

## 2021-07-11 PROCEDURE — 97530 THERAPEUTIC ACTIVITIES: CPT

## 2021-07-11 PROCEDURE — 25010000002 ENOXAPARIN PER 10 MG: Performed by: INTERNAL MEDICINE

## 2021-07-11 RX ADMIN — HYDROCODONE BITARTRATE AND ACETAMINOPHEN 1 TABLET: 5; 325 TABLET ORAL at 03:47

## 2021-07-11 RX ADMIN — TAMSULOSIN HYDROCHLORIDE 0.4 MG: 0.4 CAPSULE ORAL at 08:36

## 2021-07-11 RX ADMIN — SODIUM CHLORIDE, PRESERVATIVE FREE 10 ML: 5 INJECTION INTRAVENOUS at 08:36

## 2021-07-11 RX ADMIN — METOPROLOL SUCCINATE 50 MG: 50 TABLET, EXTENDED RELEASE ORAL at 08:36

## 2021-07-11 RX ADMIN — DOCOSANOL: 100 CREAM TOPICAL at 17:04

## 2021-07-11 RX ADMIN — DOCOSANOL: 100 CREAM TOPICAL at 21:34

## 2021-07-11 RX ADMIN — INSULIN ASPART 3 UNITS: 100 INJECTION, SOLUTION INTRAVENOUS; SUBCUTANEOUS at 17:03

## 2021-07-11 RX ADMIN — SODIUM CHLORIDE, PRESERVATIVE FREE 10 ML: 5 INJECTION INTRAVENOUS at 21:32

## 2021-07-11 RX ADMIN — ACETAMINOPHEN 650 MG: 325 TABLET, FILM COATED ORAL at 08:37

## 2021-07-11 RX ADMIN — PROCHLORPERAZINE EDISYLATE 5 MG: 5 INJECTION INTRAMUSCULAR; INTRAVENOUS at 03:47

## 2021-07-11 RX ADMIN — METRONIDAZOLE 500 MG: 500 TABLET ORAL at 13:37

## 2021-07-11 RX ADMIN — INSULIN DETEMIR 40 UNITS: 100 INJECTION, SOLUTION SUBCUTANEOUS at 08:48

## 2021-07-11 RX ADMIN — DOCOSANOL: 100 CREAM TOPICAL at 06:25

## 2021-07-11 RX ADMIN — SODIUM CHLORIDE, PRESERVATIVE FREE 10 ML: 5 INJECTION INTRAVENOUS at 08:37

## 2021-07-11 RX ADMIN — GABAPENTIN 400 MG: 400 CAPSULE ORAL at 21:31

## 2021-07-11 RX ADMIN — SODIUM CHLORIDE, PRESERVATIVE FREE 10 ML: 5 INJECTION INTRAVENOUS at 21:33

## 2021-07-11 RX ADMIN — HYDROCODONE BITARTRATE AND ACETAMINOPHEN 1 TABLET: 5; 325 TABLET ORAL at 14:01

## 2021-07-11 RX ADMIN — METRONIDAZOLE 500 MG: 500 TABLET ORAL at 21:31

## 2021-07-11 RX ADMIN — DOCOSANOL: 100 CREAM TOPICAL at 13:37

## 2021-07-11 RX ADMIN — LEVOFLOXACIN 500 MG: 500 TABLET, FILM COATED ORAL at 10:56

## 2021-07-11 RX ADMIN — HYDROCODONE BITARTRATE AND ACETAMINOPHEN 1 TABLET: 5; 325 TABLET ORAL at 21:31

## 2021-07-11 RX ADMIN — HYDROCHLOROTHIAZIDE 25 MG: 25 TABLET ORAL at 08:36

## 2021-07-11 RX ADMIN — ENOXAPARIN SODIUM 40 MG: 40 INJECTION SUBCUTANEOUS at 08:37

## 2021-07-11 RX ADMIN — ASPIRIN 81 MG: 81 TABLET, FILM COATED ORAL at 08:36

## 2021-07-11 RX ADMIN — CLOPIDOGREL BISULFATE 75 MG: 75 TABLET ORAL at 08:35

## 2021-07-11 RX ADMIN — VENLAFAXINE HYDROCHLORIDE 150 MG: 75 CAPSULE, EXTENDED RELEASE ORAL at 08:36

## 2021-07-11 RX ADMIN — POTASSIUM CHLORIDE 40 MEQ: 750 CAPSULE, EXTENDED RELEASE ORAL at 10:56

## 2021-07-11 RX ADMIN — HYDRALAZINE HYDROCHLORIDE 10 MG: 20 INJECTION INTRAMUSCULAR; INTRAVENOUS at 06:25

## 2021-07-11 RX ADMIN — GABAPENTIN 400 MG: 400 CAPSULE ORAL at 15:28

## 2021-07-11 RX ADMIN — SODIUM CHLORIDE, PRESERVATIVE FREE 10 ML: 5 INJECTION INTRAVENOUS at 08:38

## 2021-07-11 RX ADMIN — ONDANSETRON HYDROCHLORIDE 4 MG: 2 INJECTION, SOLUTION INTRAMUSCULAR; INTRAVENOUS at 17:36

## 2021-07-11 RX ADMIN — DOCOSANOL: 100 CREAM TOPICAL at 10:56

## 2021-07-11 RX ADMIN — ONDANSETRON HYDROCHLORIDE 4 MG: 2 INJECTION, SOLUTION INTRAMUSCULAR; INTRAVENOUS at 08:47

## 2021-07-11 RX ADMIN — ARIPIPRAZOLE 15 MG: 15 TABLET ORAL at 08:36

## 2021-07-11 RX ADMIN — FAMOTIDINE 20 MG: 10 INJECTION INTRAVENOUS at 08:37

## 2021-07-11 RX ADMIN — GABAPENTIN 400 MG: 400 CAPSULE ORAL at 08:35

## 2021-07-11 RX ADMIN — POTASSIUM CHLORIDE 40 MEQ: 750 CAPSULE, EXTENDED RELEASE ORAL at 15:28

## 2021-07-11 RX ADMIN — ATORVASTATIN CALCIUM 80 MG: 40 TABLET, FILM COATED ORAL at 08:35

## 2021-07-11 RX ADMIN — PROCHLORPERAZINE EDISYLATE 5 MG: 5 INJECTION INTRAMUSCULAR; INTRAVENOUS at 21:31

## 2021-07-11 RX ADMIN — FOLIC ACID 1 MG: 1 TABLET ORAL at 08:35

## 2021-07-11 RX ADMIN — INSULIN DETEMIR 40 UNITS: 100 INJECTION, SOLUTION SUBCUTANEOUS at 21:32

## 2021-07-11 RX ADMIN — LISINOPRIL 20 MG: 20 TABLET ORAL at 08:36

## 2021-07-11 RX ADMIN — INSULIN ASPART 3 UNITS: 100 INJECTION, SOLUTION INTRAVENOUS; SUBCUTANEOUS at 10:56

## 2021-07-11 RX ADMIN — METRONIDAZOLE 500 MG: 500 TABLET ORAL at 06:25

## 2021-07-11 NOTE — PLAN OF CARE
Goal Outcome Evaluation:  Plan of Care Reviewed With: patient           Outcome Summary: sup-sit cg/min of 1 with bed flat,sit-sup ind;sit-stand-sit cga of 1;amb 6',2' with rw and min of 1

## 2021-07-11 NOTE — PROGRESS NOTES
[facility] Medicine Services  INPATIENT PROGRESS NOTE    Length of Stay: 9  Date of Admission: 7/2/2021  Primary Care Physician: Marty, BEBE Luu    Subjective   Chief Complaint: n, v  HPI:  Pt seen and examined. Pt vomited her food last night. Pt is doing some better    Review of Systems     All pertinent negatives and positives are as above. All other systems have been reviewed and are negative unless otherwise stated.     Objective    Temp:  [96.8 °F (36 °C)-97.4 °F (36.3 °C)] 97.4 °F (36.3 °C)  Heart Rate:  [65-77] 68  Resp:  [18] 18  BP: (135-213)/(60-86) 176/74    Physical Exam  Constitutional:       General: She is not in acute distress.     Appearance: She is well-developed.   HENT:      Head: Normocephalic and atraumatic.      Nose: Nose normal.   Eyes:      Conjunctiva/sclera: Conjunctivae normal.   Cardiovascular:      Rate and Rhythm: Normal rate and regular rhythm.   Pulmonary:      Effort: Pulmonary effort is normal. No respiratory distress.      Breath sounds: Normal breath sounds. No wheezing or rales.   Musculoskeletal:         General: Normal range of motion.      Cervical back: Normal range of motion and neck supple.   Skin:     General: Skin is warm and dry.   Neurological:      Mental Status: She is alert and oriented to person, place, and time.   Psychiatric:         Behavior: Behavior normal.             Results Review:  I have reviewed the labs, radiology results, and diagnostic studies.    Laboratory Data:   Results from last 7 days   Lab Units 07/11/21  0558 07/10/21  0545 07/09/21 2022 07/09/21  0558   SODIUM mmol/L 140 141  --  141   POTASSIUM mmol/L 3.6 3.9 4.4 3.4*   CHLORIDE mmol/L 104 105  --  106   CO2 mmol/L 27.0 30.0*  --  31.0*   BUN mg/dL 8 5*  --  2*   CREATININE mg/dL 0.89 0.87  --  0.82   GLUCOSE mg/dL 96 181*  --  121*   CALCIUM mg/dL 8.9 9.2  --  9.3   ANION GAP mmol/L 9.0 6.0  --  4.0*     Estimated Creatinine Clearance:  90.6 mL/min (by C-G formula based on SCr of 0.89 mg/dL).          Results from last 7 days   Lab Units 07/11/21  0558 07/10/21  0545 07/09/21  0558 07/08/21  0527 07/07/21  0651   WBC 10*3/mm3 10.73 8.72 7.69 8.68 7.06   HEMOGLOBIN g/dL 11.3* 10.0* 10.4* 10.4* 10.2*   HEMATOCRIT % 35.0 30.5* 31.9* 31.8* 31.1*   PLATELETS 10*3/mm3 307 337 351 363 325           Culture Data:   No results found for: BLOODCX  No results found for: URINECX  No results found for: RESPCX  No results found for: WOUNDCX  No results found for: STOOLCX  No components found for: BODYFLD    Radiology Data:   Imaging Results (Last 24 Hours)     ** No results found for the last 24 hours. **          I have reviewed the patient's current medications.     Assessment/Plan     Active Hospital Problems    Diagnosis    • Severe malnutrition (CMS/HCC)    • Acute colitis        Plan:      1. Acute Colitis  -IVF, antibiotics   -decrease IVF   -diet as tolerated   -hopefully can be d/c home with therapy tomorrow  if clincially conitnues to improve     2. Septic shock  -resolved     3. CHON  -resolved     4. DM  -continue insulin      5. Left BKA  -staples removed     6. HTN  -home lisinopril   -restart HCTZ      7. Deconditioning  -PT, OT > home health      8. Hypokalemia  -monitor, replace

## 2021-07-11 NOTE — PLAN OF CARE
Goal Outcome Evaluation:  Plan of Care Reviewed With: patient        Progress: improving  Outcome Summary: Vss. Replacing potassium per protocol. C/o abdominal pain, PRN pain medications given per order. No s/s of distress at this time. Will continue to monitor this pt.

## 2021-07-11 NOTE — THERAPY TREATMENT NOTE
Patient Name: Ariana Martinez  : 1960    MRN: [id number]                              Today's Date: 2021       Admit Date: 2021    Visit Dx:     ICD-10-CM ICD-9-CM   1. Acute colitis  K52.9 558.9   2. Acute kidney injury (CMS/HCC)  N17.9 584.9   3. Impaired physical mobility  Z74.09 781.99   4. Impaired mobility and activities of daily living  Z74.09 V49.89    Z78.9      Patient Active Problem List   Diagnosis   • 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, with long-term current use of insulin (CMS/HCC)   • Closed nondisplaced fracture of fifth left metatarsal bone   • MAURICIO (generalized anxiety disorder)   • Depression, major, recurrent, moderate (CMS/[facility])   • GERD without esophagitis   • Long term prescription opiate use   • Mixed hyperlipidemia   • Vitamin D deficiency   • Seasonal allergic rhinitis   • Restrictive lung disease secondary to obesity   • Adult body mass index 37.0-37.9   • Snoring   • Class 3 severe obesity due to excess calories without serious comorbidity with body mass index (BMI) of 40.0 to 44.9 in adult (CMS/HCC)   • (HFpEF) heart failure with preserved ejection fraction (CMS/HCC)   • Type 2 diabetes mellitus with diabetic polyneuropathy (CMS/[facility])   • Cyanocobalamin deficiency   • CAD (coronary artery disease)   • Hypertension   • Meniere's disease   • Gastroparesis   • Otitis media with effusion, left   • Pulmonary hypertension (CMS/HCC)   • Displaced fracture of fifth metatarsal bone, left foot, subsequent encounter for fracture with nonunion   • Pes cavus   • Primary osteoarthritis involving multiple joints   • Generalized anxiety disorder   • Chronic right-sided low back pain with right-sided sciatica   • Chest pain   • Thrombocytosis (CMS/HCC)   • Leukocytosis   • Iron deficiency   • Adverse effect of iron   • Hindfoot varus, acquired, left   • Chest pain due to myocardial ischemia   • Nonrheumatic tricuspid valve regurgitation   • Iron deficiency  anemia due to chronic blood loss   • Malaise and fatigue   • Nonunion of subtalar arthrodesis   • Ankle arthritis   • Cellulitis of left lower extremity   • Urinary retention   • Acute bacterial endocarditis   • Staphylococcus aureus bacteremia   • Infection due to Acinetobacter species   • Endocarditis   • Left foot infection   • Uncontrolled hypertension   • Occlusion and stenosis of bilateral carotid arteries   • S/P BKA (below knee amputation) unilateral, left (CMS/HCC)   • Phantom pain after amputation of lower extremity (CMS/HCC)   • S/P CABG (coronary artery bypass graft)   • Acute colitis   • Severe malnutrition (CMS/HCC)     Past Medical History:   Diagnosis Date   • Angina, class IV (CMS/HCC)    • Anxiety    • Anxiety and depression    • Arthritis    • Benign paroxysmal positional vertigo    • Bladder disorder     has bladder stimulator   • Carpal tunnel syndrome    • CHF (congestive heart failure) (CMS/HCC)    • Chronic pain    • Coronary atherosclerosis     hx CABG 2005.  is followed by Dr Kwon   • Depression    • Diabetes mellitus (CMS/HCC)     Type 2, controlled   • Diabetic polyneuropathy (CMS/HCC)    • Disease of thyroid gland    • Elevated cholesterol    • Female stress incontinence    • Foot pain, left    • Full dentures    • Gastroparesis    • GERD (gastroesophageal reflux disease)    • Hyperlipidemia    • Hypertension    • Low back pain    • Malaise and fatigue    • Multiple joint pain    • Obesity     Refuses to be weighed   • Occlusion and stenosis of bilateral carotid arteries 6/18/2021   • Otalgia     Both   • Perforation of tympanic membrane     Left   • Postoperative wound infection    • Vitamin D deficiency    • Wears glasses     reading     Past Surgical History:   Procedure Laterality Date   • ABDOMINAL SURGERY     • AMPUTATION FOOT     • ANGIOPLASTY      coronary   • ANKLE ARTHROSCOPY Left 2/26/2021    Procedure: Left foot hardware removal, ankle arthroscopy, bone grafting , left  foot exostectomy;  Surgeon: Ignacio Lord DPM;  Location: [facility] OR;  Service: Podiatry;  Laterality: Left;   • BREAST BIOPSY Right    • CARDIAC CATHETERIZATION     • CARDIAC CATHETERIZATION N/A 6/20/2017    Procedure: Right Heart Cath;  Surgeon: Can Kwon MD PhD;  Location: [facility] CATH INVASIVE LOCATION;  Service:    • CARDIAC CATHETERIZATION N/A 2/18/2020    Procedure: Left Heart Cath;  Surgeon: Catalina Cooper MD;  Location: [facility] CATH INVASIVE LOCATION;  Service: Cardiology;  Laterality: N/A;   • CARPAL TUNNEL RELEASE     • CHOLECYSTECTOMY     • COLONOSCOPY N/A 6/24/2020    Procedure: COLONOSCOPY;  Surgeon: Julián Maldonado MD;  Location: [facility] ENDOSCOPY;  Service: Gastroenterology;  Laterality: N/A;   • CORONARY ARTERY BYPASS GRAFT  2005   • ENDOSCOPY N/A 10/19/2018    Procedure: ESOPHAGOGASTRODUODENOSCOPY possible dilation;  Surgeon: Julián Maldonado MD;  Location: [facility] ENDOSCOPY;  Service: Gastroenterology   • ENDOSCOPY N/A 6/24/2020    Procedure: ESOPHAGOGASTRODUODENOSCOPY WED appt please;  Surgeon: Julián Maldonado MD;  Location: [facility] ENDOSCOPY;  Service: Gastroenterology;  Laterality: N/A;   • ENDOSCOPY AND COLONOSCOPY     • FOOT SURGERY      Toes   • FOOT SURGERY     • GASTRIC BANDING      Revision, laparoscopic   • HYSTERECTOMY     • INCISION AND DRAINAGE LEG Left 3/12/2021    Procedure: Left ankle arthroscopic irrigation and debridement, screw removal;  Surgeon: Ignacio Lord DPM;  Location: [facility] OR;  Service: Podiatry;  Laterality: Left;   • MOUTH SURGERY     • SALPINGO OOPHORECTOMY     • SHOULDER SURGERY     • SUBTALAR ARTHRODESIS Left 1/16/2019    Procedure: LEFT FOOT HARDWARE REMOVAL, FIFTH METATARSAL , OPEN REDUCTION INTERNAL FIXATION, CALCANEAL OSTEOTOMY;  Surgeon: Ignacio Lord DPM;  Location: [facility] OR;  Service: Podiatry   • SUBTALAR ARTHRODESIS Left 10/16/2019    Procedure: foot hardware removal, subtalar joint fusion  possible de/reattachment of  achilles tendon        (c-arm);  Surgeon: Ignacio Lord DPM;  Location: [facility];  Service: Podiatry   • SUBTALAR ARTHRODESIS Left 9/30/2020    Procedure: subtalar, talonavicular joint arthrodesis.  Removal hardware.          (c-arm);  Surgeon: Ignacio Lord DPM;  Location: [facility];  Service: Podiatry;  Laterality: Left;   • TRANSESOPHAGEAL ECHOCARDIOGRAM (LAMONTE)      With color flow     General Information     Row Name 07/11/21 1108          OT Time and Intention    Document Type  therapy note (daily note)  -     Mode of Treatment  individual therapy;occupational therapy  -     Row Name 07/11/21 1108          General Information    Patient Profile Reviewed  yes  -     Existing Precautions/Restrictions  fall  -     Row Name 07/11/21 1108          Cognition    Orientation Status (Cognition)  oriented x 4  -     Row Name 07/11/21 1108          Safety Issues, Functional Mobility    Impairments Affecting Function (Mobility)  strength;endurance/activity tolerance;balance  -       User Key  (r) = Recorded By, (t) = Taken By, (c) = Cosigned By    Initials Name Provider Type    RC Corina Morillo COTA/L Occupational Therapy Assistant          Mobility/ADL's    No documentation.       Obj/Interventions    No documentation.       Goals/Plan     Row Name 07/11/21 1108          Transfer Goal 1 (OT)    Activity/Assistive Device (Transfer Goal 1, OT)  toilet  -     [place] Level/Cues Needed (Transfer Goal 1, OT)  contact guard assist  -RC     Time Frame (Transfer Goal 1, OT)  long term goal (LTG);by discharge  -     Progress/Outcome (Transfer Goal 1, OT)  goal not met  -RC     Row Name 07/11/21 1108          Bathing Goal 1 (OT)    Activity/Device (Bathing Goal 1, OT)  lower body bathing AD As needed  -RC     [place] Level/Cues Needed (Bathing Goal 1, OT)  minimum assist (75% or more patient effort)  -RC     Time Frame (Bathing Goal 1, OT)  long term goal (LTG);by discharge  -      Progress/Outcomes (Bathing Goal 1, OT)  goal not met  -     Row Name 07/11/21 1108          Dressing Goal 1 (OT)    Activity/Device (Dressing Goal 1, OT)  lower body dressing AD as needed  -RC     [place]/Cues Needed (Dressing Goal 1, OT)  minimum assist (75% or more patient effort)  -     Time Frame (Dressing Goal 1, OT)  long term goal (LTG);by discharge  -     Progress/Outcome (Dressing Goal 1, OT)  goal not met  -       User Key  (r) = Recorded By, (t) = Taken By, (c) = Cosigned By    Initials Name Provider Type    RC Corina Morillo COTA/L Occupational Therapy Assistant        Clinical Impression    No documentation.       Outcome Measures    No documentation.       Occupational Therapy Education                 Title: PT OT SLP Therapies (In Progress)     Topic: Occupational Therapy (In Progress)     Point: ADL training (Not Started)     Description:   Instruct learner(s) on proper safety adaptation and remediation techniques during self care or transfers.   Instruct in proper use of assistive devices.              Learner Progress:  Not documented in this visit.          Point: Home exercise program (Not Started)     Description:   Instruct learner(s) on appropriate technique for monitoring, assisting and/or progressing therapeutic exercises/activities.              Learner Progress:  Not documented in this visit.          Point: Precautions (Done)     Description:   Instruct learner(s) on prescribed precautions during self-care and functional transfers.              Learning Progress Summary           Patient Acceptance, E, VU by RB at 7/8/2021 5872    Comment: Edu pt on use of gait belt and non skid socks when OOB and no OOB without assist.                   Point: Body mechanics (Not Started)     Description:   Instruct learner(s) on proper positioning and spine alignment during self-care, functional mobility activities and/or exercises.              Learner Progress:  Not documented in this  visit.                      User Key     Initials Effective Dates Name Provider Type Discipline    RB 06/16/21 -  Fredi France, OT Occupational Therapist OT              OT Recommendation and Plan     Plan of Care Review  Plan of Care Reviewed With: patient  Outcome Summary: nursing ok'd OT tx, pt declined bathing or oob, agreeable to ue ex. 30x2 as becki.  cont poc. needs in reach     Time Calculation:   Time Calculation- OT     Row Name 07/11/21 1246             Time Calculation- OT    OT Start Time  1108  -RC      OT Stop Time  1138  -RC      OT Time Calculation (min)  30 min  -RC      Total Timed Code Minutes- OT  30 minute(s)  -RC      OT Received On  07/11/21  -RC         Timed Charges    86438 - OT Therapeutic Exercise Minutes  30  -RC         Total Minutes    Timed Charges Total Minutes  30  -RC       Total Minutes  30  -RC        User Key  (r) = Recorded By, (t) = Taken By, (c) = Cosigned By    Initials Name Provider Type    RC Corina Morillo, MCKEON/L Occupational Therapy Assistant        Therapy Charges for Today     Code Description Service Date Service Provider Modifiers Qty    71193471531 HC OT THER PROC EA 15 MIN 7/11/2021 Corina Morillo, MCKEON/L GO 2               Corinajudy Morillo, MCKEON/L  7/11/2021

## 2021-07-11 NOTE — PLAN OF CARE
Goal Outcome Evaluation:  Plan of Care Reviewed With: patient           Outcome Summary: nursing ok'd OT tx, pt declined bathing or oob, agreeable to ue ex. 30x2 as becki.  cont poc. needs in reach

## 2021-07-11 NOTE — THERAPY TREATMENT NOTE
Acute Care - Physical Therapy Treatment Note  [facility]     Patient Name: Ariana Martinez  : 1960  MRN: [id number]  Today's Date: 2021           PT Assessment (last 12 hours)      PT Evaluation and Treatment     Row Name 21 0856          Physical Therapy Time and Intention    Subjective Information  complains of;pain  -LN     Document Type  therapy note (daily note)  -LN     Mode of Treatment  individual therapy;physical therapy  -LN     Row Name 21 0856          General Information    Patient Profile Reviewed  yes  -LN     Existing Precautions/Restrictions  fall  -LN     Row Name 21 0856          Cognition    Affect/Mental Status (Cognitive)  WFL  -LN     Orientation Status (Cognition)  oriented x 4  -LN     Row Name 21 0856          Pain Scale: Numbers Pre/Post-Treatment    Pretreatment Pain Rating  5/10  -LN     Posttreatment Pain Rating  5/10  -LN     Pain Location  head  -LN     Pre/Posttreatment Pain Comment  -- had meds earlier  -LN     Row Name 21 0856          Range of Motion Comprehensive    General Range of Motion  bilateral lower extremity ROM WFL  -LN     Row Name 21 0856          Strength Comprehensive (MMT)    General Manual Muscle Testing (MMT) Assessment  other (see comments)  -LN     Row Name 21 0856          Mobility    Extremity Weight-bearing Status  left lower extremity  -LN     Left Lower Extremity (Weight-bearing Status)  -- Recent BKA  -LN     Row Name 21 0856          Bed Mobility    Bed Mobility  supine-sit;sit-supine;scooting/bridging  -LN     Scooting/Bridging [place] (Bed Mobility)  standby assist sitting scoot & supine bridges x 2 >HOB  -LN     Supine-Sit [place] (Bed Mobility)  contact guard;minimum assist (75% patient effort)  -LN     Sit-Supine [place] (Bed Mobility)  independent  -LN     Assistive Device (Bed Mobility)  -- bed flat no hr  -LN     Comment (Bed Mobility)  ace wrapped residual limb with  figure eight  -LN     Row Name 07/11/21 0856          Transfers    Transfers  sit-stand transfer;stand-sit transfer  -LN     Sit-Stand [place] (Transfers)  contact guard x 3  -LN     Stand-Sit [place] (Transfers)  contact guard  -LN     Row Name 07/11/21 0856          Sit-Stand Transfer    Assistive Device (Sit-Stand Transfers)  walker, front-wheeled  -LN     Row Name 07/11/21 0856          Stand-Sit Transfer    Assistive Device (Stand-Sit Transfers)  walker, front-wheeled  -LN     Row Name 07/11/21 0856          Gait/Stairs (Locomotion)    [place] Level (Gait)  minimum assist (75% patient effort);verbal cues  -LN     Assistive Device (Gait)  walker, front-wheeled  -LN     Distance in Feet (Gait)  6',2'  -LN     Deviations/Abnormal Patterns (Gait)  stride length decreased;gait speed decreased  -LN     Comment (Gait/Stairs)  worked with pt on putting wt through ue's and stepping through as opposed to hopping  -LN     Row Name 07/11/21 0856          Safety Issues, Functional Mobility    Impairments Affecting Function (Mobility)  strength;endurance/activity tolerance;balance  -LN     Row Name 07/11/21 0856          Hip (Therapeutic Exercise)    Hip (Therapeutic Exercise)  AROM (active range of motion)  -LN     Hip AROM (Therapeutic Exercise)  aBduction;bilateral;aDduction right hip flex;sidelying right for hip ext left  -LN     Row Name 07/11/21 0856          Knee (Therapeutic Exercise)    Knee (Therapeutic Exercise)  AROM (active range of motion)  -LN     Knee AROM (Therapeutic Exercise)  bilateral;LAQ (long arc quad)  -LN     Row Name 07/11/21 0856          Ankle (Therapeutic Exercise)    Ankle (Therapeutic Exercise)  AROM (active range of motion)  -LN     Ankle AROM (Therapeutic Exercise)  right;dorsiflexion;plantarflexion  -LN     Row Name             Wound 07/02/21 0045 Left distal leg Incision    Wound - Properties Group Placement Date: 07/02/21 -TH Placement Time: 0045 -TH Present on Hospital  Admission: Y  -TH Side: Left  -TH Orientation: distal  -TH Location: leg  -TH Primary Wound Type: Incision  -TH, staples     Retired Wound - Properties Group Date first assessed: 07/02/21 -TH Time first assessed: 0045 -TH Present on Hospital Admission: Y  -TH Side: Left  -TH Location: leg  -TH Primary Wound Type: Incision  -TH, staples     Row Name 07/11/21 0856          Plan of Care Review    Plan of Care Reviewed With  patient  -LN     Outcome Summary  sup-sit cg/min of 1 with bed flat,sit-sup ind;sit-stand-sit cga of 1;amb 6',2' with rw and min of 1  -LN     Row Name 07/11/21 0856          Vital Signs    Pre Systolic BP Rehab  150  -LN     Pre Treatment Diastolic BP  52  -LN     Post Systolic BP Rehab  136  -LN     Post Treatment Diastolic BP  54  -LN     Pretreatment Heart Rate (beats/min)  68  -LN     Posttreatment Heart Rate (beats/min)  70  -LN     Pre SpO2 (%)  96  -LN     O2 Delivery Pre Treatment  room air  -LN     Post SpO2 (%)  97  -LN     Pre Patient Position  Supine  -LN     Intra Patient Position  Standing  -LN     Post Patient Position  Supine  -LN     Row Name 07/11/21 0856          Bed Mobility Goal 1 (PT)    Activity/Assistive Device (Bed Mobility Goal 1, PT)  sit to supine/supine to sit  -LN     [place] Level/Cues Needed (Bed Mobility Goal 1, PT)  independent  -LN     Time Frame (Bed Mobility Goal 1, PT)  by discharge  -LN     Strategies/Barriers (Bed Mobility Goal 1, PT)  HOB flat, no bed rails.  -LN     Progress/Outcomes (Bed Mobility Goal 1, PT)  goal not met  -LN     Row Name 07/11/21 0856          Transfer Goal 1 (PT)    Activity/Assistive Device (Transfer Goal 1, PT)  sit-to-stand/stand-to-sit;bed-to-chair/chair-to-bed;walker, rolling  -LN     [place] Level/Cues Needed (Transfer Goal 1, PT)  supervision required  -LN     Time Frame (Transfer Goal 1, PT)  by discharge  -LN     Strategies/Barriers (Transfers Goal 1, PT)  L BKA.  -LN     Progress/Outcome (Transfer Goal 1, PT)  goal  not met  -LN     Row Name 07/11/21 0856          Gait Training Goal 1 (PT)    Activity/Assistive Device (Gait Training Goal 1, PT)  walker, rolling  -LN     [place] Level (Gait Training Goal 1, PT)  contact guard assist  -LN     Distance (Gait Training Goal 1, PT)  10' x 1.  -LN     Time Frame (Gait Training Goal 1, PT)  by discharge  -LN     Strategies/Barriers (Gait Training Goal 1, PT)  L BKA, recent fall during ambulation.  -LN     Progress/Outcome (Gait Training Goal 1, PT)  goal not met  -LN     Row Name 07/11/21 0856          ROM Goal 1 (PT)    ROM Goal 1 (PT)  Patient will re-wrap L BKA in figure-8 pattern, each shift, with ace wrap.  -LN     Time Frame (ROM Goal 1, PT)  by discharge  -LN     Strategies/Barriers (ROM Goal 1, PT)  L BKA.  -LN     Progress/Outcome (ROM Goal 1, PT)  goal not met  -LN     Row Name 07/11/21 0856          Positioning and Restraints    In Bed  supine;call light within reach;encouraged to call for assist;exit alarm on;with family/caregiver  -LN     Row Name 07/11/21 0856          Therapy Assessment/Plan (PT)    Rehab Potential (PT)  good, to achieve stated therapy goals  -LN     Criteria for Skilled Interventions Met (PT)  yes;skilled treatment is necessary  -LN       User Key  (r) = Recorded By, (t) = Taken By, (c) = Cosigned By    Initials Name Provider Type     Robert Chou, RN Registered Nurse    Adry Singleton PTA Physical Therapy Assistant        Physical Therapy Education                 Title: PT OT SLP Therapies (In Progress)     Topic: Physical Therapy (In Progress)     Point: Mobility training (Done)     Learning Progress Summary           Patient Acceptance, E, MARIANO,JAILENE,NR by LN at 7/11/2021 1259    Comment: need of prone to stretch hip flexor,hip ext in sidelying;out of w/c throughout the day to keep hip flex from becoming tight    Acceptance, E, VU,NR by CZ at 7/7/2021 1414    Comment: Educated on proper acewrapping technique for L BKA, proper use of  walker, proper hand placement with transfers.   Family Acceptance, E, VU,DU,NR by LN at 7/11/2021 1259    Comment: need of prone to stretch hip flexor,hip ext in sidelying;out of w/c throughout the day to keep hip flex from becoming tight                   Point: Home exercise program (Not Started)     Learner Progress:  Not documented in this visit.          Point: Body mechanics (Done)     Learning Progress Summary           Patient Acceptance, E, VU,DU,NR by LN at 7/11/2021 1259    Comment: need of prone to stretch hip flexor,hip ext in sidelying;out of w/c throughout the day to keep hip flex from becoming tight    Acceptance, E,TB, VU by MB at 7/8/2021 0927   Family Acceptance, E, VU,DU,NR by LN at 7/11/2021 1259    Comment: need of prone to stretch hip flexor,hip ext in sidelying;out of w/c throughout the day to keep hip flex from becoming tight                   Point: Precautions (Done)     Learning Progress Summary           Patient Acceptance, E, VU,DU,NR by LN at 7/11/2021 1259    Comment: need of prone to stretch hip flexor,hip ext in sidelying;out of w/c throughout the day to keep hip flex from becoming tight   Family Acceptance, E, VU,DU,NR by LN at 7/11/2021 1259    Comment: need of prone to stretch hip flexor,hip ext in sidelying;out of w/c throughout the day to keep hip flex from becoming tight                               User Key     Initials Effective Dates Name Provider Type Discipline    MB 06/16/21 -  Devora Presley RN Registered Nurse Nurse    LN 06/16/21 -  Adry Birmingham PTA Physical Therapy Assistant PT     06/16/21 -  Carlito Montoya, PT Physical Therapist PT              PT Recommendation and Plan  Anticipated Discharge Disposition (PT): home with home health  Therapy Frequency (PT): daily  Plan of Care Reviewed With: patient  Outcome Summary: sup-sit cg/min of 1 with bed flat,sit-sup ind;sit-stand-sit cga of 1;amb 6',2' with rw and min of 1  Outcome Measures     Row Name 07/09/21  1600 07/08/21 1600          How much help from another person do you currently need...    Turning from your back to your side while in flat bed without using bedrails?  3  -TA  3  -TA     Moving from lying on back to sitting on the side of a flat bed without bedrails?  3  -TA  3  -TA     Moving to and from a bed to a chair (including a wheelchair)?  3  -TA  3  -TA     Standing up from a chair using your arms (e.g., wheelchair, bedside chair)?  3  -TA  3  -TA     Climbing 3-5 steps with a railing?  1  -TA  1  -TA     To walk in hospital room?  3  -TA  3  -TA     AM-PAC 6 Clicks Score (PT)  16  -TA  16  -TA        Functional Assessment    Outcome Measure Options  AM-PAC 6 Clicks Basic Mobility (PT)  -TA  AM-PAC 6 Clicks Basic Mobility (PT)  -TA       User Key  (r) = Recorded By, (t) = Taken By, (c) = Cosigned By    Initials Name Provider Type    Rosalia Ambrocio PTA Physical Therapy Assistant           Time Calculation:   PT Charges     Row Name 07/11/21 1300             Time Calculation    Start Time  0856  -LN      Stop Time  0950  -LN      Time Calculation (min)  54 min  -LN      PT Received On  07/11/21  -LN         Time Calculation- PT    Total Timed Code Minutes- PT  54 minute(s)  -LN        User Key  (r) = Recorded By, (t) = Taken By, (c) = Cosigned By    Initials Name Provider Type    Adry Singleton PTA Physical Therapy Assistant        Therapy Charges for Today     Code Description Service Date Service Provider Modifiers Qty    17606904087 HC PT THER SUPP EA 15 MIN 7/10/2021 Adry Birmingham, PTA GP 1    14833212932 HC PT THER PROC EA 15 MIN 7/11/2021 Adry [place], PTA GP 1    08186852326 HC PT SELF CARE/MGMT/TRAIN EA 15 MIN 7/11/2021 Adry Birmingham, PTA GP 1    77131186781 HC PT THERAPEUTIC ACT EA 15 MIN 7/11/2021 Adry Birmingham, PTA GP 2          PT G-Codes  Outcome Measure Options: AM-PAC 6 Clicks Basic Mobility (PT)  AM-PAC 6 Clicks Score (PT): 16  AM-PAC 6 Clicks Score (OT): 17    Adry S Vinnie,  PTA  7/11/2021

## 2021-07-12 ENCOUNTER — READMISSION MANAGEMENT (OUTPATIENT)
Dept: CALL CENTER | Facility: HOSPITAL | Age: 61
End: 2021-07-12

## 2021-07-12 VITALS
TEMPERATURE: 96.2 F | RESPIRATION RATE: 18 BRPM | BODY MASS INDEX: 37.39 KG/M2 | DIASTOLIC BLOOD PRESSURE: 63 MMHG | HEIGHT: 68 IN | WEIGHT: 246.7 LBS | OXYGEN SATURATION: 96 % | SYSTOLIC BLOOD PRESSURE: 147 MMHG | HEART RATE: 75 BPM

## 2021-07-12 PROBLEM — A41.9 SHOCK, SEPTIC: Status: ACTIVE | Noted: 2021-07-12

## 2021-07-12 PROBLEM — A41.9 SEPSIS: Status: ACTIVE | Noted: 2021-07-12

## 2021-07-12 PROBLEM — R65.21 SHOCK, SEPTIC: Status: ACTIVE | Noted: 2021-07-12

## 2021-07-12 LAB
ANION GAP SERPL CALCULATED.3IONS-SCNC: 9 MMOL/L (ref 5–15)
BASOPHILS # BLD AUTO: 0.04 10*3/MM3 (ref 0–0.2)
BASOPHILS NFR BLD AUTO: 0.5 % (ref 0–1.5)
BUN SERPL-MCNC: 9 MG/DL (ref 6–20)
BUN/CREAT SERPL: 10.6 (ref 7–25)
CALCIUM SPEC-SCNC: 9.4 MG/DL (ref 8.6–10.5)
CHLORIDE SERPL-SCNC: 104 MMOL/L (ref 98–107)
CO2 SERPL-SCNC: 26 MMOL/L (ref 22–29)
CREAT SERPL-MCNC: 0.85 MG/DL (ref 0.57–1)
DEPRECATED RDW RBC AUTO: 47.1 FL (ref 37–54)
EOSINOPHIL # BLD AUTO: 0.49 10*3/MM3 (ref 0–0.4)
EOSINOPHIL NFR BLD AUTO: 5.8 % (ref 0.3–6.2)
ERYTHROCYTE [DISTWIDTH] IN BLOOD BY AUTOMATED COUNT: 14.9 % (ref 12.3–15.4)
GFR SERPL CREATININE-BSD FRML MDRD: 68 ML/MIN/1.73
GLUCOSE BLDC GLUCOMTR-MCNC: 134 MG/DL (ref 70–130)
GLUCOSE BLDC GLUCOMTR-MCNC: 77 MG/DL (ref 70–130)
GLUCOSE SERPL-MCNC: 69 MG/DL (ref 65–99)
HCT VFR BLD AUTO: 33.3 % (ref 34–46.6)
HGB BLD-MCNC: 10.6 G/DL (ref 12–15.9)
IMM GRANULOCYTES # BLD AUTO: 0.03 10*3/MM3 (ref 0–0.05)
IMM GRANULOCYTES NFR BLD AUTO: 0.4 % (ref 0–0.5)
LYMPHOCYTES # BLD AUTO: 2.22 10*3/MM3 (ref 0.7–3.1)
LYMPHOCYTES NFR BLD AUTO: 26.1 % (ref 19.6–45.3)
MCH RBC QN AUTO: 28 PG (ref 26.6–33)
MCHC RBC AUTO-ENTMCNC: 31.8 G/DL (ref 31.5–35.7)
MCV RBC AUTO: 88.1 FL (ref 79–97)
MONOCYTES # BLD AUTO: 0.7 10*3/MM3 (ref 0.1–0.9)
MONOCYTES NFR BLD AUTO: 8.2 % (ref 5–12)
NEUTROPHILS NFR BLD AUTO: 5.02 10*3/MM3 (ref 1.7–7)
NEUTROPHILS NFR BLD AUTO: 59 % (ref 42.7–76)
NRBC BLD AUTO-RTO: 0 /100 WBC (ref 0–0.2)
PLATELET # BLD AUTO: 425 10*3/MM3 (ref 140–450)
PMV BLD AUTO: 9.3 FL (ref 6–12)
POTASSIUM SERPL-SCNC: 3.9 MMOL/L (ref 3.5–5.2)
RBC # BLD AUTO: 3.78 10*6/MM3 (ref 3.77–5.28)
SODIUM SERPL-SCNC: 139 MMOL/L (ref 136–145)
WBC # BLD AUTO: 8.5 10*3/MM3 (ref 3.4–10.8)

## 2021-07-12 PROCEDURE — 97530 THERAPEUTIC ACTIVITIES: CPT

## 2021-07-12 PROCEDURE — 25010000002 ENOXAPARIN PER 10 MG: Performed by: INTERNAL MEDICINE

## 2021-07-12 PROCEDURE — 97110 THERAPEUTIC EXERCISES: CPT

## 2021-07-12 PROCEDURE — 85025 COMPLETE CBC W/AUTO DIFF WBC: CPT | Performed by: INTERNAL MEDICINE

## 2021-07-12 PROCEDURE — 82962 GLUCOSE BLOOD TEST: CPT

## 2021-07-12 PROCEDURE — 97535 SELF CARE MNGMENT TRAINING: CPT

## 2021-07-12 PROCEDURE — 25010000002 ONDANSETRON PER 1 MG: Performed by: INTERNAL MEDICINE

## 2021-07-12 PROCEDURE — 80048 BASIC METABOLIC PNL TOTAL CA: CPT | Performed by: INTERNAL MEDICINE

## 2021-07-12 RX ORDER — TAMSULOSIN HYDROCHLORIDE 0.4 MG/1
0.4 CAPSULE ORAL DAILY
Qty: 30 CAPSULE | Refills: 0 | Status: ON HOLD | OUTPATIENT
Start: 2021-07-12 | End: 2022-04-04

## 2021-07-12 RX ORDER — ONDANSETRON HYDROCHLORIDE 8 MG/1
8 TABLET, FILM COATED ORAL EVERY 8 HOURS PRN
Qty: 21 TABLET | Refills: 0 | Status: SHIPPED | OUTPATIENT
Start: 2021-07-12 | End: 2021-12-17

## 2021-07-12 RX ORDER — LEVOFLOXACIN 500 MG/1
500 TABLET, FILM COATED ORAL EVERY 24 HOURS
Qty: 2 TABLET | Refills: 0 | Status: SHIPPED | OUTPATIENT
Start: 2021-07-12 | End: 2021-07-14

## 2021-07-12 RX ORDER — INSULIN ASPART 100 [IU]/ML
INJECTION, SOLUTION INTRAVENOUS; SUBCUTANEOUS
Qty: 15 ML | Refills: 3 | Status: SHIPPED | OUTPATIENT
Start: 2021-07-12 | End: 2021-12-29 | Stop reason: SDUPTHER

## 2021-07-12 RX ORDER — METRONIDAZOLE 500 MG/1
500 TABLET ORAL EVERY 8 HOURS SCHEDULED
Qty: 6 TABLET | Refills: 0 | Status: SHIPPED | OUTPATIENT
Start: 2021-07-12 | End: 2021-07-14

## 2021-07-12 RX ADMIN — ENOXAPARIN SODIUM 40 MG: 40 INJECTION SUBCUTANEOUS at 07:32

## 2021-07-12 RX ADMIN — HYDROCODONE BITARTRATE AND ACETAMINOPHEN 1 TABLET: 5; 325 TABLET ORAL at 04:43

## 2021-07-12 RX ADMIN — DOCOSANOL: 100 CREAM TOPICAL at 06:13

## 2021-07-12 RX ADMIN — ATORVASTATIN CALCIUM 80 MG: 40 TABLET, FILM COATED ORAL at 07:31

## 2021-07-12 RX ADMIN — FAMOTIDINE 20 MG: 10 INJECTION INTRAVENOUS at 07:32

## 2021-07-12 RX ADMIN — ARIPIPRAZOLE 15 MG: 15 TABLET ORAL at 07:31

## 2021-07-12 RX ADMIN — SODIUM CHLORIDE, PRESERVATIVE FREE 10 ML: 5 INJECTION INTRAVENOUS at 07:33

## 2021-07-12 RX ADMIN — METRONIDAZOLE 500 MG: 500 TABLET ORAL at 06:13

## 2021-07-12 RX ADMIN — ONDANSETRON HYDROCHLORIDE 4 MG: 2 INJECTION, SOLUTION INTRAMUSCULAR; INTRAVENOUS at 04:43

## 2021-07-12 RX ADMIN — LEVOFLOXACIN 500 MG: 500 TABLET, FILM COATED ORAL at 12:06

## 2021-07-12 RX ADMIN — HYDROCHLOROTHIAZIDE 25 MG: 25 TABLET ORAL at 07:31

## 2021-07-12 RX ADMIN — METOPROLOL SUCCINATE 50 MG: 50 TABLET, EXTENDED RELEASE ORAL at 07:31

## 2021-07-12 RX ADMIN — ASPIRIN 81 MG: 81 TABLET, FILM COATED ORAL at 07:31

## 2021-07-12 RX ADMIN — SODIUM CHLORIDE, PRESERVATIVE FREE 10 ML: 5 INJECTION INTRAVENOUS at 07:32

## 2021-07-12 RX ADMIN — FOLIC ACID 1 MG: 1 TABLET ORAL at 07:31

## 2021-07-12 RX ADMIN — CLOPIDOGREL BISULFATE 75 MG: 75 TABLET ORAL at 07:31

## 2021-07-12 RX ADMIN — VENLAFAXINE HYDROCHLORIDE 150 MG: 75 CAPSULE, EXTENDED RELEASE ORAL at 07:31

## 2021-07-12 RX ADMIN — LISINOPRIL 20 MG: 20 TABLET ORAL at 07:31

## 2021-07-12 RX ADMIN — GABAPENTIN 400 MG: 400 CAPSULE ORAL at 07:41

## 2021-07-12 RX ADMIN — TAMSULOSIN HYDROCHLORIDE 0.4 MG: 0.4 CAPSULE ORAL at 07:31

## 2021-07-12 RX ADMIN — SODIUM CHLORIDE, PRESERVATIVE FREE 10 ML: 5 INJECTION INTRAVENOUS at 07:31

## 2021-07-12 NOTE — OUTREACH NOTE
Prep Survey      Responses   [facility] patient discharged from?  [place]   Is LACE score < 7 ?  No   Emergency Room discharge w/ pulse ox?  No   Eligibility  [facility]   Date of Admission  07/02/21   Date of Discharge  07/12/21   Discharge Disposition  Home-Health Care [facility]   Discharge diagnosis  Acute colitis, septic shock   Does the patient have one of the following disease processes/diagnoses(primary or secondary)?  Sepsis   Does the patient have Home health ordered?  Yes   What is the Home health agency?   [facility]   Is there a DME ordered?  No   General alerts for this patient  had BKA 5/21   Prep survey completed?  Yes          Sherie Fernandez RN

## 2021-07-12 NOTE — DISCHARGE SUMMARY
[facility] Medicine Services  DISCHARGE SUMMARY       Date of Admission: 7/2/2021  Date of Discharge:  7/12/2021  Primary Care Physician: Marty, BEBE Luu    Presenting Problem/History of Present Illness:  Acute colitis [K52.9]  Acute kidney injury (CMS/HCC) [N17.9]     Final Discharge Diagnoses:  Active Hospital Problems    Diagnosis    • **Acute colitis    • Sepsis (CMS/HCC)    • Severe malnutrition (CMS/HCC)    Septic shock  Severe sepsis secondary to colitis  Acute kidney injury  Type 2 diabetes mellitus with uncontrolled hyperglycemia  Obesity  History of coronary artery disease  Left BKA  Depression  Essential hypertension  Diastolic congestive heart failure    Consults:   Consults     No orders found from 6/3/2021 to 7/3/2021.          Procedures Performed: None                Pertinent Test Results:   07/02/2021 2202 07/02/2021 2230 Comprehensive Metabolic Panel [[id number]]    (Abnormal)   Blood    Final result Component Value Units   Glucose 241High  mg/dL   BUN 32High  mg/dL   Creatinine 2.00High  mg/dL   Sodium 133Low  mmol/L   Potassium 4.0 mmol/L   Chloride 95Low  mmol/L   CO2 25.0 mmol/L   Calcium 9.3 mg/dL   Total Protein 6.8 g/dL   Albumin 3.50 g/dL   ALT (SGPT) 19 U/L   AST (SGOT) 18 U/L   Alkaline Phosphatase 129High  U/L   Total Bilirubin 0.3 mg/dL   eGFR Non African Am 26Low  mL/min/1.73   Globulin 3.3 gm/dL   A/G Ratio 1.1 g/dL   BUN/Creatinine Ratio 16.0    Anion Gap 13.0 mmol/L           07/02/2021 2202 07/02/2021 2230 Lipase [[id number]]   Blood    Final result Component Value Units   Lipase 14 U/L           07/02/2021 2202 07/02/2021 2220 CBC Auto Differential [[id number]]   (Abnormal)   Blood    Final result Component Value Units   WBC 24.21High  10*3/mm3   RBC 4.15 10*6/mm3   Hemoglobin 11.5Low  g/dL   Hematocrit 35.2 %   MCV 84.8 fL   MCH 27.7 pg   MCHC 32.7 g/dL   RDW 14.2 %   RDW-SD 43.9 fl   MPV 9.9 fL   Platelets 463High  10*3/mm3    Neutrophil Rel % 84.4High  %   Lymphocyte Rel % 7.6Low  %   Monocyte Rel % 5.7 %   Eosinophil Rel % 0.8 %   Basophil Rel % 0.4 %   Immature Granulocyte Rel % 1.1High  %   Neutrophils Absolute 20.45High  10*3/mm3   Lymphocytes Absolute 1.83 10*3/mm3   Monocytes Absolute 1.37High  10*3/mm3   Eosinophils Absolute 0.20 10*3/mm3   Basophils Absolute 0.09 10*3/mm3   Immature Grans, Absolute 0.27High  10*3/mm3   nRBC 0.0 /100 WBC           07/02/2021 2202 07/02/2021 2226 Lactic Acid, Plasma [[id number]]   Blood    Final result Component Value Units   Lactate 1.5 mmol/L        Procedure Component Value Units Date/Time   CT Abdomen Pelvis With Contrast [[id number]] Miguel as Reviewed   Order Status: Completed Collected: 07/02/21 2200    Updated: 07/02/21 2255   Narrative:     CT ABDOMEN PELVIS WITH IV CONTRAST     INDICATION: 61 years Female; abdominal pain     TECHNIQUE:  CT scan of the abdomen and pelvis was performed with   IV contrast.  This exam was performed according to our   departmental dose-optimization program, which includes automated   exposure control, adjustment of the mA and/or kV according to   patient size and/or use of iterative reconstruction technique.     Comparison: 11/27/2017.     FINDINGS:   Liver: Unremarkable.   Gallbladder/Biliary tree: Status post cholecystectomy. No   significant biliary dilatation. Pneumobilia is probably within   normal limits for previous sphincterotomy.   Pancreas: Fatty atrophy of the pancreas.   Spleen: Unremarkable.   Adrenals: Unremarkable.   Genitourinary: No hydronephrosis or nephrolithiasis. No bladder   wall thickening. Status post hysterectomy. No adnexal masses.   Gastrointestinal: No gastric wall thickening. Mild   circumferential wall thickening of the transverse colon, splenic   flexure, descending colon with scattered air-fluid levels without   significant surrounding fat stranding/edema probably represents   mild infectious colitis or inflammatory bowel disease.  No free   air or free fluid. Status post appendectomy.   Peritoneum: Unremarkable.   Vasculature: Mild atherosclerosis of the aorta and iliac arteries   including the mesenteric branches.   Lymph nodes: No pathologically enlarged lymph nodes.   Bones: Mild degenerative changes of the bilateral hips and lower   lumbar spine. Neurostimulator electrode terminates in the left S3   foramen.   Soft tissues: Focal areas of skin thickening with subcutaneous   edema/fat stranding in the abdominal wall, left greater than   right, could be related to subcutaneous injections.   Incidental findings: Scattered coronary artery calcifications.   Elevation of the right hemidiaphragm of unknown etiology.    Impression:     Mild circumferential wall thickening of the transverse colon,   splenic flexure, descending colon with scattered air-fluid levels   without significant surrounding fat stranding/edema probably   represents mild infectious colitis or inflammatory bowel disease.      Chief Complaint on Day of Discharge: None    Hospital Course:  The patient is a 61 y.o. female with known past medical history of coronary artery disease, CABG in 2005, diabetes mellitus, hypertension, left below-knee amputation, diastolic congestive heart failure rate, dyslipidemia and obesity who presented to the ER with complaint of abdominal pain of 2 days duration.  She was hypotensive in the emergency room at [facility] and was given IV fluid, central line was placed in ED and she underwent CT of the abdomen and pelvis which was concerning for colitis.  She was started on IV antibiotics for sepsis secondary to colitis.  She was transiently placed on pressors with Levophed for septic shock.  She was able to tolerate a diet and abdominal pain improved.  Her hypotension improved and she was restarted on her home antihypertensive medications.  She was weaned off pressors and discharged in stable condition to complete a course of oral  "antibiotics.     Condition on Discharge: Stable    Physical Exam on Discharge:  /63 (BP Location: Right arm, Patient Position: Lying)   Pulse 75   Temp 96.2 °F (35.7 °C) (Oral)   Resp 18   Ht 172.7 cm (68\")   Wt 112 kg (246 lb 11.2 oz)   SpO2 96%   BMI 37.51 kg/m²      Physical Exam  Constitutional:       Appearance: She is well-developed.   HENT:      Head: Normocephalic and atraumatic.   Eyes:      Pupils: Pupils are equal, round, and reactive to light.   Cardiovascular:      Rate and Rhythm: Normal rate and regular rhythm.      Heart sounds: Normal heart sounds. No murmur heard.   No friction rub. No gallop.    Pulmonary:      Effort: Pulmonary effort is normal. No respiratory distress.      Breath sounds: Normal breath sounds. No wheezing or rales.   Chest:      Chest wall: No tenderness.   Abdominal:      General: Bowel sounds are normal. There is no distension.      Palpations: Abdomen is soft.      Tenderness: There is no tenderness. There is no guarding.   Musculoskeletal:      Cervical back: Normal range of motion and neck supple.      Comments: Left BKA present   Skin:     General: Skin is warm and dry.   Psychiatric:         Behavior: Behavior normal.         Thought Content: Thought content normal.     Discharge Disposition:  Home-Health Care [facility]    Discharge Medications:     Discharge Medications      New Medications      Instructions Start Date   levoFLOXacin 500 MG tablet  Commonly known as: LEVAQUIN   500 mg, Oral, Every 24 Hours      metroNIDAZOLE 500 MG tablet  Commonly known as: FLAGYL   500 mg, Oral, Every 8 Hours Scheduled      tamsulosin 0.4 MG capsule 24 hr capsule  Commonly known as: FLOMAX   0.4 mg, Oral, Daily, Urinary retention         Changes to Medications      Instructions Start Date   NovoLOG FlexPen 100 UNIT/ML solution pen-injector sc pen  Generic drug: insulin aspart  What changed: See the new instructions.   Before meals Blood glucose  150-199 mg/dL - 3 units 200-249 - " 5 units 250-299 - 8 units  300-349 - 10 units  350-400 - 12 units  above 400 - 14 units         Continue These Medications      Instructions Start Date   Accu-Chek Iris Plus test strip  Generic drug: glucose blood   USE TO CHECK BLOOD SUGAR 4 TIMES DAILY. E11.9      ARIPiprazole 15 MG tablet  Commonly known as: ABILIFY   TAKE 1 TABLET BY MOUTH EVERY DAY      aspirin 81 MG EC tablet   81 mg, Oral, Daily      atorvastatin 80 MG tablet  Commonly known as: LIPITOR   TAKE 1 TABLET BY MOUTH EVERY DAY      B-D ULTRAFINE III SHORT PEN 31G X 8 MM misc  Generic drug: Insulin Pen Needle   USE TO INJECT 4 TIMES A DAY      BASAGLAR KWIKPEN 100 UNIT/ML injection pen   60 Units, Subcutaneous, Nightly      BD [employer] misc   1 each, Does not apply, Take As Directed      Citracal/Vitamin D 250-200 MG-UNIT tablet  Generic drug: Calcium Citrate-Vitamin D   2 tablets, Oral, 2 Times Daily      clopidogrel 75 MG tablet  Commonly known as: PLAVIX   TAKE 1 TABLET BY MOUTH EVERY DAY      cyanocobalamin 1000 MCG/ML injection   1,000 mcg, Intramuscular, Every 28 Days      folic acid 1 MG tablet  Commonly known as: FOLVITE   1 mg, Oral, Daily      furosemide 40 MG tablet  Commonly known as: LASIX   TAKE 1 TABLET BY MOUTH EVERY DAY      gabapentin 400 MG capsule  Commonly known as: NEURONTIN   400 mg, Oral, 3 Times Daily      Glucagon Emergency 1 MG kit   USE AS DIRECTED AS NEEDED      glucose monitor monitoring kit   1 each, Does not apply, Daily, accucheck iris meter, E11.9      hydroCHLOROthiazide 25 MG tablet  Commonly known as: HYDRODIURIL   25 mg, Oral, Daily      HYDROcodone-acetaminophen 7.5-325 MG per tablet  Commonly known as: NORCO   1 tablet, Oral, Every 6 Hours PRN      lisinopril 20 MG tablet  Commonly known as: PRINIVIL,ZESTRIL   20 mg, Oral, Daily      LORazepam 0.5 MG tablet  Commonly known as: ATIVAN   0.5 mg, Oral, Every 8 Hours PRN, Frequency and # increased on 6/24/21. Refill early accordingly.      meclizine  "25 MG tablet  Commonly known as: ANTIVERT   25 mg, Oral, 3 Times Daily PRN      methocarbamol 500 MG tablet  Commonly known as: Robaxin   500 mg, Oral, 2 Times Daily PRN      metoclopramide 10 MG tablet  Commonly known as: REGLAN   TAKE 1 TABLET BY MOUTH FOUR TIMES A DAY AS NEEDED      metoprolol succinate XL 50 MG 24 hr tablet  Commonly known as: Toprol XL   50 mg, Oral, Daily, Dose increased 5/24/21      mirtazapine 30 MG tablet  Commonly known as: Remeron   30 mg, Oral, Nightly      naloxone 0.4 MG/ML injection  Commonly known as: NARCAN   0.2 mg, Intravenous, Every 5 Minutes PRN      nitroglycerin 0.4 MG SL tablet  Commonly known as: NITROSTAT   0.4 mg, Sublingual, Every 5 Minutes PRN, Take no more than 3 doses in 15 minutes.      ondansetron 8 MG tablet  Commonly known as: ZOFRAN   8 mg, Oral, Every 8 Hours PRN      ONE TOUCH ULTRA MINI w/Device kit   USE AS DIRECTED TO CHECK BLOOD SUGAR      pantoprazole 20 MG EC tablet  Commonly known as: PROTONIX   TAKE 1 TABLET BY MOUTH EVERY DAY      Syringe/Needle (Disp) 25G X 5/8\" 3 ML misc  Commonly known as: Luer Lock Safety Syringes   1 each, Does not apply, Daily, 1 Q28 DAYS      venlafaxine  MG 24 hr capsule  Commonly known as: EFFEXOR-XR   TAKE 1 CAPSULE BY MOUTH TWICE A DAY      vitamin D 1.25 MG (99091 UT) capsule capsule  Commonly known as: ERGOCALCIFEROL   TAKE ONE CAPSULE BY MOUTH EVERY SUNDAY.             Discharge Diet:   Diet Instructions     Diet: Consistent Carbohydrate      Discharge Diet: Consistent Carbohydrate          Activity at Discharge:   Activity Instructions     Activity as Tolerated            Discharge Care Plan/Instructions:   1.  Follow-up with your primary care provider within 1 week of discharge.  2.  Follow-up with your endocrinologist Dr. Kingston Godinez within 1 week of discharge for adjustment of your insulin regimen for diabetes.    Follow-up Appointments:   Future Appointments   Date Time Provider Department Center   7/19/2021  " 9:30 AM Marcela Lawson APRN MGW GE [facility]   7/19/2021  1:30 PM Ferguson, BEBE Luu PC [facility]   7/29/2021  1:45 PM NURSE [facility] OPI [facility]   7/29/2021  2:15 PM Teressa Hammond APRN MGW ONC [facility]   8/24/2021  3:00 PM Elvis Gamboa APRN MGW END [facility]   9/16/2021  9:00 AM Ferguson, BEBE Luu PC [facility]   12/30/2021  3:30 PM Bill Gibson MD [facility] None       Test Results Pending at Discharge:     Brandon Martel MD  07/12/21  11:09 CDT    Time: 38 minutes of time was spent evaluating patient and planning discharge.      Part of this note may be an electronic transcription/translation of spoken language to printed text using the Dragon Dictation system.

## 2021-07-12 NOTE — PLAN OF CARE
Goal Outcome Evaluation:  Plan of Care Reviewed With: patient        Progress: improving  Outcome Summary: Nursing OK'd tx pt agreeable to bathing and sitting eob cabrera for bath and min assist to [facility] gown, sat eob ~ 30 min supervision, B ue arom ex. supervision for sup<>sit. pt e/o fatigue and request to do lb bath later after resting  cont poc, recommend assist and cont OT at next level of care

## 2021-07-12 NOTE — PLAN OF CARE
Goal Outcome Evaluation:   Pt mod in with supine to sit. Stands with cga and transfers to bsc with min assist. Gt with fww 2-3 steps min x 1. Will need follow up [facility].

## 2021-07-12 NOTE — THERAPY TREATMENT NOTE
Acute Care - Physical Therapy Treatment Note  [facility]     Patient Name: Ariana Martinez  : 1960  MRN: [id number]  Today's Date: 2021           PT Assessment (last 12 hours)      PT Evaluation and Treatment     Row Name 21 1006          Physical Therapy Time and Intention    Subjective Information  no complaints  -RW     Document Type  therapy note (daily note)  -RW     Mode of Treatment  physical therapy  -RW     Patient Effort  good  -RW     Row Name 21 1006          General Information    Patient Profile Reviewed  yes  -RW     Existing Precautions/Restrictions  fall  -RW     Row Name 21 1006          Cognition    Affect/Mental Status (Cognitive)  WFL  -RW     Orientation Status (Cognition)  oriented x 4  -RW     Row Name 21 1006          Pain Scale: Numbers Pre/Post-Treatment    Pretreatment Pain Rating  0/10 - no pain  -RW     Posttreatment Pain Rating  0/10 - no pain  -     Row Name 21 1006          Range of Motion Comprehensive    General Range of Motion  --  -     Row Name 21 1006          Strength Comprehensive (MMT)    General Manual Muscle Testing (MMT) Assessment  --  -     Row Name 21 1006          Mobility    Extremity Weight-bearing Status  left lower extremity  -RW     Left Lower Extremity (Weight-bearing Status)  -- Recent BKA  -     Row Name 21 1006          Bed Mobility    Bed Mobility  supine-sit;sit-supine;scooting/bridging  -RW     Scooting/Bridging [place] (Bed Mobility)  --  -RW     Supine-Sit [place] (Bed Mobility)  modified independence  -RW     Sit-Supine [place] (Bed Mobility)  --  -RW     Assistive Device (Bed Mobility)  bed rails  -RW     Row Name 21 1006          Transfers    Transfers  sit-stand transfer;stand-sit transfer  -RW     Sit-Stand [place] (Transfers)  contact guard x 3  -RW     Stand-Sit [place]elyn (Transfers)  contact guard  -     Row Name 21 1006           Sit-Stand Transfer    Assistive Device (Sit-Stand Transfers)  walker, front-wheeled  -RW     Row Name 07/12/21 1006          Stand-Sit Transfer    Assistive Device (Stand-Sit Transfers)  walker, front-wheeled  -RW     Row Name 07/12/21 1006          Gait/Stairs (Locomotion)    [place] Level (Gait)  minimum assist (75% patient effort);verbal cues  -RW     Assistive Device (Gait)  walker, front-wheeled  -RW     Distance in Feet (Gait)  2-3 small steps  -RW     Pattern (Gait)  step-to  -RW     Deviations/Abnormal Patterns (Gait)  --  -RW     Row Name 07/12/21 1006          Safety Issues, Functional Mobility    Impairments Affecting Function (Mobility)  strength;endurance/activity tolerance;balance  -     Row Name 07/12/21 1006          Motor Skills    Therapeutic Exercise  hip;knee;ankle  -     Row Name 07/12/21 1006          Hip (Therapeutic Exercise)    Hip AROM (Therapeutic Exercise)  flexion;extension;10 repetitions;5 repetitions;supine  -     Row Name 07/12/21 1006          Knee (Therapeutic Exercise)    Knee AROM (Therapeutic Exercise)  LAQ (long arc quad);10 repetitions;5 repetitions  -RW     Row Name 07/12/21 1006          Ankle (Therapeutic Exercise)    Ankle AROM (Therapeutic Exercise)  dorsiflexion;plantarflexion;10 repetitions;5 repetitions  -     Row Name             Wound 07/02/21 0045 Left distal leg Incision    Wound - Properties Group Placement Date: 07/02/21 -TH Placement Time: 0045 -TH Present on Hospital Admission: Y  -TH Side: Left  -TH Orientation: distal  -TH Location: leg  -TH Primary Wound Type: Incision  -TH, staples     Retired Wound - Properties Group Date first assessed: 07/02/21 -TH Time first assessed: 0045 -TH Present on Hospital Admission: Y  -TH Side: Left  -TH Location: leg  -TH Primary Wound Type: Incision  -TH, staples     Row Name 07/12/21 1006          Vital Signs    Pre Systolic BP Rehab  117  -RW     Pre Treatment Diastolic BP  56  -RW     Row Name 07/12/21 1006           Bed Mobility Goal 1 (PT)    Activity/Assistive Device (Bed Mobility Goal 1, PT)  sit to supine/supine to sit  -RW     [place] Level/Cues Needed (Bed Mobility Goal 1, PT)  independent  -RW     Time Frame (Bed Mobility Goal 1, PT)  by discharge  -RW     Strategies/Barriers (Bed Mobility Goal 1, PT)  HOB flat, no bed rails.  -RW     Progress/Outcomes (Bed Mobility Goal 1, PT)  goal not met  -RW     Row Name 07/12/21 1006          Transfer Goal 1 (PT)    Activity/Assistive Device (Transfer Goal 1, PT)  sit-to-stand/stand-to-sit;bed-to-chair/chair-to-bed;walker, rolling  -RW     [place] Level/Cues Needed (Transfer Goal 1, PT)  supervision required  -RW     Time Frame (Transfer Goal 1, PT)  by discharge  -RW     Strategies/Barriers (Transfers Goal 1, PT)  L BKA.  -RW     Progress/Outcome (Transfer Goal 1, PT)  goal not met  -RW     Row Name 07/12/21 1006          Gait Training Goal 1 (PT)    Activity/Assistive Device (Gait Training Goal 1, PT)  walker, rolling  -RW     [place] Level (Gait Training Goal 1, PT)  contact guard assist  -RW     Distance (Gait Training Goal 1, PT)  10' x 1.  -RW     Time Frame (Gait Training Goal 1, PT)  by discharge  -RW     Strategies/Barriers (Gait Training Goal 1, PT)  L BKA, recent fall during ambulation.  -RW     Progress/Outcome (Gait Training Goal 1, PT)  goal not met  -RW     Row Name 07/12/21 1006          ROM Goal 1 (PT)    ROM Goal 1 (PT)  Patient will re-wrap L BKA in figure-8 pattern, each shift, with ace wrap.  -RW     Time Frame (ROM Goal 1, PT)  by discharge  -RW     Strategies/Barriers (ROM Goal 1, PT)  L BKA.  -RW     Progress/Outcome (ROM Goal 1, PT)  goal not met  -RW     Row Name 07/12/21 1006          Positioning and Restraints    Pre-Treatment Position  in bed  -RW     Post Treatment Position  chair  -RW     In Chair  with family/caregiver  -RW     Row Name 07/12/21 1006          Therapy Assessment/Plan (PT)    Rehab Potential (PT)  good, to  achieve stated therapy goals  -RW     Criteria for Skilled Interventions Met (PT)  yes;skilled treatment is necessary  -RW       User Key  (r) = Recorded By, (t) = Taken By, (c) = Cosigned By    Initials Name Provider Type    TH Robert Chou, RN Registered Nurse    Sergio Mejia, PTA Physical Therapy Assistant        Physical Therapy Education                 Title: PT OT SLP Therapies (Resolved)     Topic: Physical Therapy (Resolved)     Point: Mobility training (Resolved)     Learning Progress Summary           Patient Acceptance, E, VU,DU,NR by LN at 7/11/2021 1259    Comment: need of prone to stretch hip flexor,hip ext in sidelying;out of w/c throughout the day to keep hip flex from becoming tight    Acceptance, E, VU,NR by CZ at 7/7/2021 1414    Comment: Educated on proper acewrapping technique for L BKA, proper use of walker, proper hand placement with transfers.   Family Acceptance, E, VU,DU,NR by LN at 7/11/2021 1259    Comment: need of prone to stretch hip flexor,hip ext in sidelying;out of w/c throughout the day to keep hip flex from becoming tight                   Point: Home exercise program (Resolved)     Learner Progress:  Not documented in this visit.          Point: Body mechanics (Resolved)     Learning Progress Summary           Patient Acceptance, E, VU,DU,NR by LN at 7/11/2021 1259    Comment: need of prone to stretch hip flexor,hip ext in sidelying;out of w/c throughout the day to keep hip flex from becoming tight    Acceptance, E,TB, VU by MB at 7/8/2021 0927   Family Acceptance, E, VU,DU,NR by LN at 7/11/2021 1259    Comment: need of prone to stretch hip flexor,hip ext in sidelying;out of w/c throughout the day to keep hip flex from becoming tight                   Point: Precautions (Resolved)     Learning Progress Summary           Patient Acceptance, E, VU,DU,NR by LN at 7/11/2021 1259    Comment: need of prone to stretch hip flexor,hip ext in sidelying;out of w/c throughout the  day to keep hip flex from becoming tight   Family Andreina, ANN, JAILENE QUINTANA,NR by LN at 7/11/2021 1259    Comment: need of prone to stretch hip flexor,hip ext in sidelying;out of w/c throughout the day to keep hip flex from becoming tight                               User Key     Initials Effective Dates Name Provider Type Discipline    MB 06/16/21 -  Devora Presley RN Registered Nurse Nurse    LN 06/16/21 -  Adry Birmingham PTA Physical Therapy Assistant PT    CZ 06/16/21 -  Carlito Montoya PT Physical Therapist PT              PT Recommendation and Plan  Anticipated Discharge Disposition (PT): home with home health  Therapy Frequency (PT): daily     Outcome Measures     Row Name 07/09/21 1600             How much help from another person do you currently need...    Turning from your back to your side while in flat bed without using bedrails?  3  -TA      Moving from lying on back to sitting on the side of a flat bed without bedrails?  3  -TA      Moving to and from a bed to a chair (including a wheelchair)?  3  -TA      Standing up from a chair using your arms (e.g., wheelchair, bedside chair)?  3  -TA      Climbing 3-5 steps with a railing?  1  -TA      To walk in hospital room?  3  -TA      AM-PAC 6 Clicks Score (PT)  16  -TA         Functional Assessment    Outcome Measure Options  AM-PAC 6 Clicks Basic Mobility (PT)  -TA        User Key  (r) = Recorded By, (t) = Taken By, (c) = Cosigned By    Initials Name Provider Type    TA Rosalia King, MANJINDER Physical Therapy Assistant           Time Calculation:   PT Charges     Row Name 07/12/21 1217             Time Calculation    Start Time  1006  -RW      Stop Time  1034  -RW      Time Calculation (min)  28 min  -RW         Time Calculation- PT    Total Timed Code Minutes- PT  28 minute(s)  -RW         Timed Charges    19506 - PT Therapeutic Exercise Minutes  10  -RW      19745 - PT Therapeutic Activity Minutes  18  -RW         Total Minutes    Timed Charges Total  Minutes  28  -RW       Total Minutes  28  -RW        User Key  (r) = Recorded By, (t) = Taken By, (c) = Cosigned By    Initials Name Provider Type    Sergio Mejia PTA Physical Therapy Assistant        Therapy Charges for Today     Code Description Service Date Service Provider Modifiers Qty    38795328608  PT THER PROC EA 15 MIN 7/12/2021 Sergio Fuentes, MANJINDER GP 1    77803510117 HC PT THERAPEUTIC ACT EA 15 MIN 7/12/2021 Sergio Fuentes, MANJINDER GP 1          PT G-Codes  Outcome Measure Options: AM-PAC 6 Clicks Basic Mobility (PT)  AM-PAC 6 Clicks Score (PT): 16  AM-PAC 6 Clicks Score (OT): 17    Sergio Fuentes PTA  7/12/2021

## 2021-07-13 ENCOUNTER — TRANSITIONAL CARE MANAGEMENT TELEPHONE ENCOUNTER (OUTPATIENT)
Dept: CALL CENTER | Facility: HOSPITAL | Age: 61
End: 2021-07-13

## 2021-07-13 NOTE — OUTREACH NOTE
Call Center TCM Note      Responses   [facility] patient discharged from?  [place]   Does the patient have one of the following disease processes/diagnoses(primary or secondary)?  Sepsis   TCM attempt successful?  Yes   Call start time  1557   Call end time  1558   Discharge diagnosis  Acute colitis, septic shock   Does the patient have all medications related to this admission filled (includes all antibiotics, inhalers, nebulizers,steroids,etc.)  Yes   Is the patient taking all medications as directed (includes completed medication regime)?  Yes   Does the patient have a primary care provider?   Yes   Comments regarding PCP  hospital f/u with PCP on 7/19   Does the patient have an appointment with their PCP within 7 days of discharge?  Yes   Has the patient kept scheduled appointments due by today?  N/A   What is the Home health agency?   [facility]   Psychosocial issues?  No   Did the patient receive a copy of their discharge instructions?  Yes   Nursing interventions  Reviewed instructions with patient   What is the patient's perception of their health status since discharge?  Improving   Nursing interventions  Nurse provided patient education   Is the patient/caregiver able to teach back Sepsis?  S - Shivering,fever or very cold, P - Pale or discolored skin, E - Extreme pain or generalized discomfort (worst ever,especially abdomen), S - Sleepy, difficult to arouse,confused, I -   I feel like I might die-a feeling of hopelessness, S - Short of breath   Is the patient/caregiver able to teach back signs and symptoms of worsening condition:  Fever   Is the patient/caregiver able to teach back the hierarchy of who to call/visit for symptoms/problems? PCP, Specialist, Home health nurse, Urgent Care, ED, 911  Yes   TCM call completed?  Yes   Wrap up additional comments  States she is doing well, following her discharge instructions closely, confirmed hospital f/u with PCP for 7/19.          Yolanda  NAHEED Thomas RN    7/13/2021, 15:58 EDT

## 2021-07-13 NOTE — PAYOR COMM NOTE
"Brittany Osborne   [facility]  phone [phone]  fax [phone]    Auth# [id number]    Ariana Bailey (61 y.o. Female)     Date of Birth Social Security Number Address Home Phone MRN    1960  [id number]  [facility] [address] [phone] [id number]    Orthodoxy Marital Status          Yazidi        Admission Date Admission Type Admitting Provider Attending Provider Department, Room/Bed    7/2/21 Emergency Behroozi, Saeid, MD  [facility] 3 EAST, 381/1    Discharge Date Discharge Disposition Discharge Destination        7/12/2021 Home-Health Care Svc              Attending Provider: (none)   Allergies: Seroquel [Quetiapine], Avandia [Rosiglitazone], Morphine And Related, Oxycodone    Isolation: None   Infection: None   Code Status: Prior    Ht: 172.7 cm (68\")   Wt: 112 kg (246 lb 11.2 oz)    Admission Cmt: None   Principal Problem: Acute colitis [K52.9]                 Active Insurance as of 7/2/2021     Primary Coverage     Payor Plan Insurance Group Employer/Plan Group    [facility] BLUE CR[employer] [id number]     Payor Plan Address Payor Plan Phone Number Payor Plan Fax Number Effective Dates    [address] [phone]  1/1/2015 - None Entered    [place] [address]       Subscriber Name Subscriber Birth Date Member ID       ARIANA BAILEY 1960 [id number]                 Emergency Contacts      (Rel.) Home Phone Work Phone Mobile Phone    Nadeem Bailey (Spouse) [phone] -- [phone]    Елена David (Sister) [phone] -- [phone]               Discharge Summary      Brandon Martel MD at 07/12/21 1109              [facility]  DISCHARGE SUMMARY       Date of Admission: 7/2/2021  Date of Discharge:  7/12/2021  Primary Care Physician: Marty, BEBE Luu    Presenting Problem/History of Present Illness:  Acute colitis [K52.9]  Acute " kidney injury (CMS/[facility]) [N17.9]     Final Discharge Diagnoses:  Active Hospital Problems    Diagnosis    • **Acute colitis    • Sepsis (CMS/[facility])    • Severe malnutrition (CMS/[facility])    Septic shock  Severe sepsis secondary to colitis  Acute kidney injury  Type 2 diabetes mellitus with uncontrolled hyperglycemia  Obesity  History of coronary artery disease  Left BKA  Depression  Essential hypertension  Diastolic congestive heart failure    Consults:   Consults     No orders found from 6/3/2021 to 7/3/2021.          Procedures Performed: None                Pertinent Test Results:   07/02/2021 2202 07/02/2021 2230 Comprehensive Metabolic Panel [[id number]]    (Abnormal)   Blood    Final result Component Value Units   Glucose 241High  mg/dL   BUN 32High  mg/dL   Creatinine 2.00High  mg/dL   Sodium 133Low  mmol/L   Potassium 4.0 mmol/L   Chloride 95Low  mmol/L   CO2 25.0 mmol/L   Calcium 9.3 mg/dL   Total Protein 6.8 g/dL   Albumin 3.50 g/dL   ALT (SGPT) 19 U/L   AST (SGOT) 18 U/L   Alkaline Phosphatase 129High  U/L   Total Bilirubin 0.3 mg/dL   eGFR Non African Am 26Low  mL/min/1.73   Globulin 3.3 gm/dL   A/G Ratio 1.1 g/dL   BUN/Creatinine Ratio 16.0    Anion Gap 13.0 mmol/L           07/02/2021 2202 07/02/2021 2230 Lipase [[id number]]   Blood    Final result Component Value Units   Lipase 14 U/L           07/02/2021 2202 07/02/2021 2220 CBC Auto Differential [[id number]]   (Abnormal)   Blood    Final result Component Value Units   WBC 24.21High  10*3/mm3   RBC 4.15 10*6/mm3   Hemoglobin 11.5Low  g/dL   Hematocrit 35.2 %   MCV 84.8 fL   MCH 27.7 pg   MCHC 32.7 g/dL   RDW 14.2 %   RDW-SD 43.9 fl   MPV 9.9 fL   Platelets 463High  10*3/mm3   Neutrophil Rel % 84.4High  %   Lymphocyte Rel % 7.6Low  %   Monocyte Rel % 5.7 %   Eosinophil Rel % 0.8 %   Basophil Rel % 0.4 %   Immature Granulocyte Rel % 1.1High  %   Neutrophils Absolute 20.45High  10*3/mm3   Lymphocytes Absolute 1.83 10*3/mm3   Monocytes Absolute 1.37High  10*3/mm3    Eosinophils Absolute 0.20 10*3/mm3   Basophils Absolute 0.09 10*3/mm3   Immature Grans, Absolute 0.27High  10*3/mm3   nRBC 0.0 /100 WBC           07/02/2021 2202 07/02/2021 2226 Lactic Acid, Plasma [[id number]]   Blood    Final result Component Value Units   Lactate 1.5 mmol/L        Procedure Component Value Units Date/Time   CT Abdomen Pelvis With Contrast [[id number]] Miguel as Reviewed   Order Status: Completed Collected: 07/02/21 2200    Updated: 07/02/21 2255   Narrative:     CT ABDOMEN PELVIS WITH IV CONTRAST     INDICATION: 61 years Female; abdominal pain     TECHNIQUE:  CT scan of the abdomen and pelvis was performed with   IV contrast.  This exam was performed according to our   departmental dose-optimization program, which includes automated   exposure control, adjustment of the mA and/or kV according to   patient size and/or use of iterative reconstruction technique.     Comparison: 11/27/2017.     FINDINGS:   Liver: Unremarkable.   Gallbladder/Biliary tree: Status post cholecystectomy. No   significant biliary dilatation. Pneumobilia is probably within   normal limits for previous sphincterotomy.   Pancreas: Fatty atrophy of the pancreas.   Spleen: Unremarkable.   Adrenals: Unremarkable.   Genitourinary: No hydronephrosis or nephrolithiasis. No bladder   wall thickening. Status post hysterectomy. No adnexal masses.   Gastrointestinal: No gastric wall thickening. Mild   circumferential wall thickening of the transverse colon, splenic   flexure, descending colon with scattered air-fluid levels without   significant surrounding fat stranding/edema probably represents   mild infectious colitis or inflammatory bowel disease. No free   air or free fluid. Status post appendectomy.   Peritoneum: Unremarkable.   Vasculature: Mild atherosclerosis of the aorta and iliac arteries   including the mesenteric branches.   Lymph nodes: No pathologically enlarged lymph nodes.   Bones: Mild degenerative changes of the  "bilateral hips and lower   lumbar spine. Neurostimulator electrode terminates in the left S3   foramen.   Soft tissues: Focal areas of skin thickening with subcutaneous   edema/fat stranding in the abdominal wall, left greater than   right, could be related to subcutaneous injections.   Incidental findings: Scattered coronary artery calcifications.   Elevation of the right hemidiaphragm of unknown etiology.    Impression:     Mild circumferential wall thickening of the transverse colon,   splenic flexure, descending colon with scattered air-fluid levels   without significant surrounding fat stranding/edema probably   represents mild infectious colitis or inflammatory bowel disease.      Chief Complaint on Day of Discharge: None    Hospital Course:  The patient is a 61 y.o. female with known past medical history of coronary artery disease, CABG in 2005, diabetes mellitus, hypertension, left below-knee amputation, diastolic congestive heart failure rate, dyslipidemia and obesity who presented to the ER with complaint of abdominal pain of 2 days duration.  She was hypotensive in the emergency room at [facility] and was given IV fluid, central line was placed in ED and she underwent CT of the abdomen and pelvis which was concerning for colitis.  She was started on IV antibiotics for sepsis secondary to colitis.  She was transiently placed on pressors with Levophed for septic shock.  She was able to tolerate a diet and abdominal pain improved.  Her hypotension improved and she was restarted on her home antihypertensive medications.  She was weaned off pressors and discharged in stable condition to complete a course of oral antibiotics.     Condition on Discharge: Stable    Physical Exam on Discharge:  /63 (BP Location: Right arm, Patient Position: Lying)   Pulse 75   Temp 96.2 °F (35.7 °C) (Oral)   Resp 18   Ht 172.7 cm (68\")   Wt 112 kg (246 lb 11.2 oz)   SpO2 96%   BMI 37.51 kg/m²  "     Physical Exam  Constitutional:       Appearance: She is well-developed.   HENT:      Head: Normocephalic and atraumatic.   Eyes:      Pupils: Pupils are equal, round, and reactive to light.   Cardiovascular:      Rate and Rhythm: Normal rate and regular rhythm.      Heart sounds: Normal heart sounds. No murmur heard.   No friction rub. No gallop.    Pulmonary:      Effort: Pulmonary effort is normal. No respiratory distress.      Breath sounds: Normal breath sounds. No wheezing or rales.   Chest:      Chest wall: No tenderness.   Abdominal:      General: Bowel sounds are normal. There is no distension.      Palpations: Abdomen is soft.      Tenderness: There is no tenderness. There is no guarding.   Musculoskeletal:      Cervical back: Normal range of motion and neck supple.      Comments: Left BKA present   Skin:     General: Skin is warm and dry.   Psychiatric:         Behavior: Behavior normal.         Thought Content: Thought content normal.     Discharge Disposition:  Home-Health Care [facility]    Discharge Medications:     Discharge Medications      New Medications      Instructions Start Date   levoFLOXacin 500 MG tablet  Commonly known as: LEVAQUIN   500 mg, Oral, Every 24 Hours      metroNIDAZOLE 500 MG tablet  Commonly known as: FLAGYL   500 mg, Oral, Every 8 Hours Scheduled      tamsulosin 0.4 MG capsule 24 hr capsule  Commonly known as: FLOMAX   0.4 mg, Oral, Daily, Urinary retention         Changes to Medications      Instructions Start Date   NovoLOG FlexPen 100 UNIT/ML solution pen-injector sc pen  Generic drug: insulin aspart  What changed: See the new instructions.   Before meals Blood glucose  150-199 mg/dL - 3 units 200-249 - 5 units 250-299 - 8 units  300-349 - 10 units  350-400 - 12 units  above 400 - 14 units         Continue These Medications      Instructions Start Date   Accu-Chek Iris Plus test strip  Generic drug: glucose blood   USE TO CHECK BLOOD SUGAR 4 TIMES DAILY. E11.9       ARIPiprazole 15 MG tablet  Commonly known as: ABILIFY   TAKE 1 TABLET BY MOUTH EVERY DAY      aspirin 81 MG EC tablet   81 mg, Oral, Daily      atorvastatin 80 MG tablet  Commonly known as: LIPITOR   TAKE 1 TABLET BY MOUTH EVERY DAY      B-D ULTRAFINE III SHORT PEN 31G X 8 MM misc  Generic drug: Insulin Pen Needle   USE TO INJECT 4 TIMES A DAY      BASAGLAR KWIKPEN 100 UNIT/ML injection pen   60 Units, Subcutaneous, Nightly      BD [employer] misc   1 each, Does not apply, Take As Directed      Citracal/Vitamin D 250-200 MG-UNIT tablet  Generic drug: Calcium Citrate-Vitamin D   2 tablets, Oral, 2 Times Daily      clopidogrel 75 MG tablet  Commonly known as: PLAVIX   TAKE 1 TABLET BY MOUTH EVERY DAY      cyanocobalamin 1000 MCG/ML injection   1,000 mcg, Intramuscular, Every 28 Days      folic acid 1 MG tablet  Commonly known as: FOLVITE   1 mg, Oral, Daily      furosemide 40 MG tablet  Commonly known as: LASIX   TAKE 1 TABLET BY MOUTH EVERY DAY      gabapentin 400 MG capsule  Commonly known as: NEURONTIN   400 mg, Oral, 3 Times Daily      Glucagon Emergency 1 MG kit   USE AS DIRECTED AS NEEDED      glucose monitor monitoring kit   1 each, Does not apply, Daily, accucheck eve meter, E11.9      hydroCHLOROthiazide 25 MG tablet  Commonly known as: HYDRODIURIL   25 mg, Oral, Daily      HYDROcodone-acetaminophen 7.5-325 MG per tablet  Commonly known as: NORCO   1 tablet, Oral, Every 6 Hours PRN      lisinopril 20 MG tablet  Commonly known as: PRINIVIL,ZESTRIL   20 mg, Oral, Daily      LORazepam 0.5 MG tablet  Commonly known as: ATIVAN   0.5 mg, Oral, Every 8 Hours PRN, Frequency and # increased on 6/24/21. Refill early accordingly.      meclizine 25 MG tablet  Commonly known as: ANTIVERT   25 mg, Oral, 3 Times Daily PRN      methocarbamol 500 MG tablet  Commonly known as: Robaxin   500 mg, Oral, 2 Times Daily PRN      metoclopramide 10 MG tablet  Commonly known as: REGLAN   TAKE 1 TABLET BY MOUTH FOUR TIMES  "A DAY AS NEEDED      metoprolol succinate XL 50 MG 24 hr tablet  Commonly known as: Toprol XL   50 mg, Oral, Daily, Dose increased 5/24/21      mirtazapine 30 MG tablet  Commonly known as: Remeron   30 mg, Oral, Nightly      naloxone 0.4 MG/ML injection  Commonly known as: NARCAN   0.2 mg, Intravenous, Every 5 Minutes PRN      nitroglycerin 0.4 MG SL tablet  Commonly known as: NITROSTAT   0.4 mg, Sublingual, Every 5 Minutes PRN, Take no more than 3 doses in 15 minutes.      ondansetron 8 MG tablet  Commonly known as: ZOFRAN   8 mg, Oral, Every 8 Hours PRN      ONE TOUCH ULTRA MINI w/Device kit   USE AS DIRECTED TO CHECK BLOOD SUGAR      pantoprazole 20 MG EC tablet  Commonly known as: PROTONIX   TAKE 1 TABLET BY MOUTH EVERY DAY      Syringe/Needle (Disp) 25G X 5/8\" 3 ML misc  Commonly known as: Luer Lock Safety Syringes   1 each, Does not apply, Daily, 1 Q28 DAYS      venlafaxine  MG 24 hr capsule  Commonly known as: EFFEXOR-XR   TAKE 1 CAPSULE BY MOUTH TWICE A DAY      vitamin D 1.25 MG (66034 UT) capsule capsule  Commonly known as: ERGOCALCIFEROL   TAKE ONE CAPSULE BY MOUTH EVERY SUNDAY.             Discharge Diet:   Diet Instructions     Diet: Consistent Carbohydrate      Discharge Diet: Consistent Carbohydrate          Activity at Discharge:   Activity Instructions     Activity as Tolerated            Discharge Care Plan/Instructions:   1.  Follow-up with your primary care provider within 1 week of discharge.  2.  Follow-up with your endocrinologist Dr. Kingston Godinez within 1 week of discharge for adjustment of your insulin regimen for diabetes.    Follow-up Appointments:   Future Appointments   Date Time Provider Department Center   7/19/2021  9:30 AM Marcela Lawson APRN [facility] GE [facility]   7/19/2021  1:30 PM Kimberyl Ferguson APRN [facility] PC POW [facility]   7/29/2021  1:45 PM NURSE [facility] O[facility]   7/29/2021  2:15 PM Teressa Hammond APRN [facility] ONC [facility]   8/24/2021  3:00 PM Elvis Gamboa APRN [facility] " END MAD [facility]   9/16/2021  9:00 AM Ferguson, BEBE Luu [facility] PC POW [facility]   12/30/2021  3:30 PM Bill Gibson MD [facility] None       Test Results Pending at Discharge:     Brandon Martel MD  07/12/21  11:09 CDT    Time: 38 minutes of time was spent evaluating patient and planning discharge.      Part of this note may be an electronic transcription/translation of spoken language to printed text using the Dragon Dictation system.            Electronically signed by Brandon Martel MD at 07/13/21 0737

## 2021-07-15 RX ORDER — BLOOD SUGAR DIAGNOSTIC
STRIP MISCELLANEOUS
Qty: 100 EACH | Refills: 3 | Status: SHIPPED | OUTPATIENT
Start: 2021-07-15 | End: 2021-10-18 | Stop reason: SDUPTHER

## 2021-07-15 RX ORDER — METOCLOPRAMIDE 10 MG/1
TABLET ORAL
Qty: 120 TABLET | Refills: 1 | OUTPATIENT
Start: 2021-07-15

## 2021-07-19 ENCOUNTER — LAB (OUTPATIENT)
Dept: LAB | Facility: OTHER | Age: 61
End: 2021-07-19

## 2021-07-19 ENCOUNTER — OFFICE VISIT (OUTPATIENT)
Dept: GASTROENTEROLOGY | Facility: CLINIC | Age: 61
End: 2021-07-19

## 2021-07-19 ENCOUNTER — OFFICE VISIT (OUTPATIENT)
Dept: FAMILY MEDICINE CLINIC | Facility: CLINIC | Age: 61
End: 2021-07-19

## 2021-07-19 VITALS
BODY MASS INDEX: 34.1 KG/M2 | WEIGHT: 225 LBS | SYSTOLIC BLOOD PRESSURE: 107 MMHG | HEIGHT: 68 IN | DIASTOLIC BLOOD PRESSURE: 64 MMHG | HEART RATE: 73 BPM

## 2021-07-19 VITALS
DIASTOLIC BLOOD PRESSURE: 68 MMHG | OXYGEN SATURATION: 99 % | HEART RATE: 61 BPM | TEMPERATURE: 97.4 F | WEIGHT: 225 LBS | BODY MASS INDEX: 34.1 KG/M2 | RESPIRATION RATE: 16 BRPM | HEIGHT: 68 IN | SYSTOLIC BLOOD PRESSURE: 112 MMHG

## 2021-07-19 DIAGNOSIS — N17.9 AKI (ACUTE KIDNEY INJURY) (HCC): ICD-10-CM

## 2021-07-19 DIAGNOSIS — Z09 HOSPITAL DISCHARGE FOLLOW-UP: ICD-10-CM

## 2021-07-19 DIAGNOSIS — D72.829 LEUKOCYTOSIS, UNSPECIFIED TYPE: ICD-10-CM

## 2021-07-19 DIAGNOSIS — K21.00 GASTROESOPHAGEAL REFLUX DISEASE WITH ESOPHAGITIS WITHOUT HEMORRHAGE: ICD-10-CM

## 2021-07-19 DIAGNOSIS — K31.84 GASTROPARESIS: Primary | ICD-10-CM

## 2021-07-19 DIAGNOSIS — D72.829 LEUKOCYTOSIS, UNSPECIFIED TYPE: Primary | ICD-10-CM

## 2021-07-19 DIAGNOSIS — E53.8 CYANOCOBALAMIN DEFICIENCY: ICD-10-CM

## 2021-07-19 LAB
ALBUMIN SERPL-MCNC: 4.2 G/DL (ref 3.5–5.2)
ALBUMIN/GLOB SERPL: 1.1 G/DL
ALP SERPL-CCNC: 114 U/L (ref 39–117)
ALT SERPL W P-5'-P-CCNC: 43 U/L (ref 1–33)
ANION GAP SERPL CALCULATED.3IONS-SCNC: 16 MMOL/L (ref 5–15)
ANISOCYTOSIS BLD QL: NORMAL
AST SERPL-CCNC: 48 U/L (ref 1–32)
BILIRUB SERPL-MCNC: 0.3 MG/DL (ref 0–1.2)
BUN SERPL-MCNC: 18 MG/DL (ref 6–20)
BUN/CREAT SERPL: 20.5 (ref 7–25)
CALCIUM SPEC-SCNC: 9.5 MG/DL (ref 8.6–10.5)
CHLORIDE SERPL-SCNC: 98 MMOL/L (ref 98–107)
CO2 SERPL-SCNC: 27 MMOL/L (ref 22–29)
CREAT SERPL-MCNC: 0.88 MG/DL (ref 0.57–1)
DEPRECATED RDW RBC AUTO: 46.2 FL (ref 37–54)
EOSINOPHIL # BLD MANUAL: 0.28 10*3/MM3 (ref 0–0.4)
EOSINOPHIL NFR BLD MANUAL: 3 % (ref 0.3–6.2)
ERYTHROCYTE [DISTWIDTH] IN BLOOD BY AUTOMATED COUNT: 14.7 % (ref 12.3–15.4)
GFR SERPL CREATININE-BSD FRML MDRD: 66 ML/MIN/1.73
GLOBULIN UR ELPH-MCNC: 3.7 GM/DL
GLUCOSE SERPL-MCNC: 73 MG/DL (ref 65–99)
HCT VFR BLD AUTO: 41 % (ref 34–46.6)
HGB BLD-MCNC: 14 G/DL (ref 12–15.9)
LYMPHOCYTES # BLD MANUAL: 2.34 10*3/MM3 (ref 0.7–3.1)
LYMPHOCYTES NFR BLD MANUAL: 25 % (ref 19.6–45.3)
LYMPHOCYTES NFR BLD MANUAL: 7 % (ref 5–12)
MCH RBC QN AUTO: 29.7 PG (ref 26.6–33)
MCHC RBC AUTO-ENTMCNC: 34.1 G/DL (ref 31.5–35.7)
MCV RBC AUTO: 86.9 FL (ref 79–97)
MONOCYTES # BLD AUTO: 0.66 10*3/MM3 (ref 0.1–0.9)
NEUTROPHILS # BLD AUTO: 6.09 10*3/MM3 (ref 1.7–7)
NEUTROPHILS NFR BLD MANUAL: 65 % (ref 42.7–76)
PLATELET # BLD AUTO: 444 10*3/MM3 (ref 140–450)
PMV BLD AUTO: 9.4 FL (ref 6–12)
POTASSIUM SERPL-SCNC: 3.8 MMOL/L (ref 3.5–5.2)
PROT SERPL-MCNC: 7.9 G/DL (ref 6–8.5)
RBC # BLD AUTO: 4.72 10*6/MM3 (ref 3.77–5.28)
SMALL PLATELETS BLD QL SMEAR: ADEQUATE
SODIUM SERPL-SCNC: 141 MMOL/L (ref 136–145)
WBC # BLD AUTO: 9.37 10*3/MM3 (ref 3.4–10.8)
WBC MORPH BLD: NORMAL

## 2021-07-19 PROCEDURE — 99213 OFFICE O/P EST LOW 20 MIN: CPT | Performed by: NURSE PRACTITIONER

## 2021-07-19 PROCEDURE — 80053 COMPREHEN METABOLIC PANEL: CPT | Performed by: NURSE PRACTITIONER

## 2021-07-19 PROCEDURE — 85025 COMPLETE CBC W/AUTO DIFF WBC: CPT | Performed by: NURSE PRACTITIONER

## 2021-07-19 PROCEDURE — 36415 COLL VENOUS BLD VENIPUNCTURE: CPT | Performed by: NURSE PRACTITIONER

## 2021-07-19 RX ORDER — METOCLOPRAMIDE 10 MG/1
10 TABLET ORAL 4 TIMES DAILY PRN
Qty: 120 TABLET | Refills: 1 | Status: SHIPPED | OUTPATIENT
Start: 2021-07-19 | End: 2021-09-09

## 2021-07-19 RX ORDER — PANTOPRAZOLE SODIUM 20 MG/1
20 TABLET, DELAYED RELEASE ORAL DAILY
Qty: 90 TABLET | Refills: 3 | Status: SHIPPED | OUTPATIENT
Start: 2021-07-19 | End: 2022-02-28 | Stop reason: SDUPTHER

## 2021-07-19 RX ORDER — CYANOCOBALAMIN 1000 UG/ML
1000 INJECTION, SOLUTION INTRAMUSCULAR; SUBCUTANEOUS
Qty: 3 ML | Refills: 0 | Status: SHIPPED | OUTPATIENT
Start: 2021-07-19 | End: 2021-10-05

## 2021-07-19 NOTE — PATIENT INSTRUCTIONS
MyPlate from USDA    MyPlate is an outline of a general healthy diet based on the 2010 Dietary Guidelines for Americans, from the U.[place]. Department of Agriculture (USDA). It sets guidelines for how much food you should eat from each food group based on your age, sex, and level of physical activity.  What are tips for following MyPlate?  To follow MyPlate recommendations:  · Eat a wide variety of fruits and vegetables, grains, and protein foods.  · Serve smaller portions and eat less food throughout the day.  · Limit portion sizes to avoid overeating.  · Enjoy your food.  · Get at least 150 minutes of exercise every week. This is about 30 minutes each day, 5 or more days per week.  It can be difficult to have every meal look like MyPlate. Think about MyPlate as eating guidelines for an entire day, rather than each individual meal.  Fruits and vegetables  · Make half of your plate fruits and vegetables.  · Eat many different colors of fruits and vegetables each day.  · For a 2,000 calorie daily food plan, eat:  ? 2½ cups of vegetables every day.  ? 2 cups of fruit every day.  · 1 cup is equal to:  ? 1 cup raw or cooked vegetables.  ? 1 cup raw fruit.  ? 1 medium-sized orange, apple, or banana.  ? 1 cup 100% fruit or vegetable juice.  ? 2 cups raw leafy greens, such as lettuce, spinach, or kale.  ? ½ cup dried fruit.  Grains  · One fourth of your plate should be grains.  · Make at least half of the grains you eat each day whole grains.  · For a 2,000 calorie daily food plan, eat 6 oz of grains every day.  · 1 oz is equal to:  ? 1 slice bread.  ? 1 cup cereal.  ? ½ cup cooked rice, cereal, or pasta.  Protein  · One fourth of your plate should be protein.  · Eat a wide variety of protein foods, including meat, poultry, fish, eggs, beans, nuts, and tofu.  · For a 2,000 calorie daily food plan, eat 5½ oz of protein every day.  · 1 oz is equal to:  ? 1 oz meat, poultry, or fish.  ? ¼ cup cooked beans.  ? 1 egg.  ? ½ oz nuts  or seeds.  ? 1 Tbsp peanut butter.  Dairy  · Drink fat-free or low-fat (1%) milk.  · Eat or drink dairy as a side to meals.  · For a 2,000 calorie daily food plan, eat or drink 3 cups of dairy every day.  · 1 cup is equal to:  ? 1 cup milk, yogurt, cottage cheese, or soy milk (soy beverage).  ? 2 oz processed cheese.  ? 1½ oz natural cheese.  Fats, oils, salt, and sugars  · Only small amounts of oils are recommended.  · Avoid foods that are high in calories and low in nutritional value (empty calories), like foods high in fat or added sugars.  · Choose foods that are low in salt (sodium). Choose foods that have less than 140 milligrams (mg) of sodium per serving.  · Drink water instead of sugary drinks. Drink enough water each day to keep your urine pale yellow.  Where to find support  · Work with your health care provider or a nutrition specialist (dietitian) to develop a customized eating plan that is right for you.  · Download an cuong (mobile application) to help you track your daily food intake.  Where to find more information  · Go to ChooseMyPlate.gov for more information.  Summary  · MyPlate is a general guideline for healthy eating from the USDA. It is based on the 2010 Dietary Guidelines for Americans.  · In general, fruits and vegetables should take up ½ of your plate, grains should take up ¼ of your plate, and protein should take up ¼ of your plate.  This information is not intended to replace advice given to you by your health care provider. Make sure you discuss any questions you have with your health care provider.  Document Revised: 05/21/2020 Document Reviewed: 03/19/2018  Elsevier Patient Education © 2021 Else[employer] Inc.    Gastroparesis    Gastroparesis is a condition in which food takes longer than normal to empty from the stomach. The condition is usually long-lasting (chronic). It may also be called delayed gastric emptying.  There is no cure, but there are treatments and things that you can do at  home to help relieve symptoms. Treating the underlying condition that causes gastroparesis can also help relieve symptoms.  What are the causes?  In many cases, the cause of this condition is not known. Possible causes include:  · A hormone (endocrine) disorder, such as hypothyroidism or diabetes.  · A nervous system disease, such as Parkinson's disease or multiple sclerosis.  · Cancer, infection, or surgery that affects the stomach or vagus nerve. The vagus nerve runs from your chest, through your neck, to the lower part of your brain.  · A connective tissue disorder, such as scleroderma.  · Certain medicines.  What increases the risk?  You are more likely to develop this condition if you:  · Have certain disorders or diseases, including:  ? An endocrine disorder.  ? An eating disorder.  ? Amyloidosis.  ? Scleroderma.  ? Parkinson's disease.  ? Multiple sclerosis.  ? Cancer or infection of the stomach or the vagus nerve.  · Have had surgery on the stomach or vagus nerve.  · Take certain medicines.  · Are female.  What are the signs or symptoms?  Symptoms of this condition include:  · Feeling full after eating very little.  · Nausea.  · Vomiting.  · Heartburn.  · Abdominal bloating.  · Inconsistent blood sugar (glucose) levels on blood tests.  · Lack of appetite.  · Weight loss.  · Acid from the stomach coming up into the esophagus (gastroesophageal reflux).  · Sudden tightening (spasm) of the stomach, which can be painful.  Symptoms may come and go. Some people may not notice any symptoms.  How is this diagnosed?  This condition is diagnosed with tests, such as:  · Tests that check how long it takes food to move through the stomach and intestines. These tests include:  ? Upper gastrointestinal (GI) series. For this test, you drink a liquid that shows up well on X-rays, and then X-rays will be taken of your intestines.  ? Gastric emptying scintigraphy. For this test, you eat food that contains a small amount of  radioactive material, and then scans are taken.  ? Wireless capsule GI monitoring system. For this test, you swallow a pill (capsule) that records information about how foods and fluid move through your stomach.  · Gastric manometry. For this test, a tube is passed down your throat and into your stomach to measure electrical and muscular activity.  · Endoscopy. For this test, a long, thin tube is passed down your throat and into your stomach to check for problems in your stomach lining.  · Ultrasound. This test uses sound waves to create images of inside the body. This can help rule out gallbladder disease or pancreatitis as a cause of your symptoms.  How is this treated?  There is no cure for gastroparesis. Treatment may include:  · Treating the underlying cause.  · Managing your symptoms by making changes to your diet and exercise habits.  · Taking medicines to control nausea and vomiting and to stimulate stomach muscles.  · Getting food through a feeding tube in the hospital. This may be done in severe cases.  · Having surgery to insert a device into your body that helps improve stomach emptying and control nausea and vomiting (gastric neurostimulator).  Follow these instructions at home:  · Take over-the-counter and prescription medicines only as told by your health care provider.  · Follow instructions from your health care provider about eating or drinking restrictions. Your health care provider may recommend that you:  ? Eat smaller meals more often.  ? Eat low-fat foods.  ? Eat low-fiber forms of high-fiber foods. For example, eat cooked vegetables instead of raw vegetables.  ? Have only liquid foods instead of solid foods. Liquid foods are easier to digest.  · Drink enough fluid to keep your urine pale yellow.  · Exercise as often as told by your health care provider.  · Keep all follow-up visits as told by your health care provider. This is important.  Contact a health care provider if you:  · Notice that  your symptoms do not improve with treatment.  · Have new symptoms.  Get help right away if you:  · Have severe abdominal pain that does not improve with treatment.  · Have nausea that is severe or does not go away.  · Cannot drink fluids without vomiting.  Summary  · Gastroparesis is a chronic condition in which food takes longer than normal to empty from the stomach.  · Symptoms include nausea, vomiting, heartburn, abdominal bloating, and loss of appetite.  · Eating smaller portions, and low-fat, low-fiber foods may help you manage your symptoms.  · Get help right away if you have severe abdominal pain.  This information is not intended to replace advice given to you by your health care provider. Make sure you discuss any questions you have with your health care provider.  Document Revised: 03/18/2019 Document Reviewed: 10/23/2018  [employer] Patient Education © 2021 Elsevier Inc.

## 2021-07-19 NOTE — PROGRESS NOTES
Subjective   Ariana Martinez is a 61 y.o. female.       Chief Complaint   Patient presents with   • Hospital Follow Up Visit        History of Present Illness   Hospital follow up from  7/2/21-/7/12/21 admission to [facility] for acute colitis with sepsis, and CHON.  Hospital records reviewed at this time. Patient admitted after a 2 day bout of abdominal pain, presented to ED at [facility] hypotensive requiring a brief use of vasopressors for support of hemodynamics. CT abd pelvis positive for acute colitis.  she was treated with IV fluids and IV antibiotics. Eventually her antihypertensives were resumed and patient discharged on 7/12/21 home with home health services.      No other complaint today.    Parts of most recent relevant visit HPI, ROS  and PE may be carried forward and all are updated as appropriate for current situation.    Past Surgical History:   Procedure Laterality Date   • ABDOMINAL SURGERY     • AMPUTATION FOOT     • ANGIOPLASTY      coronary   • ANKLE ARTHROSCOPY Left 2/26/2021    Procedure: Left foot hardware removal, ankle arthroscopy, bone grafting , left foot exostectomy;  Surgeon: Ignacio Lord DPM;  Location: [facility] OR;  Service: Podiatry;  Laterality: Left;   • BREAST BIOPSY Right    • CARDIAC CATHETERIZATION     • CARDIAC CATHETERIZATION N/A 6/20/2017    Procedure: Right Heart Cath;  Surgeon: Can Kwon MD PhD;  Location: [facility] CATH INVASIVE LOCATION;  Service:    • CARDIAC CATHETERIZATION N/A 2/18/2020    Procedure: Left Heart Cath;  Surgeon: Catalina Cooper MD;  Location: [facility] CATH INVASIVE LOCATION;  Service: Cardiology;  Laterality: N/A;   • CARPAL TUNNEL RELEASE     • CHOLECYSTECTOMY     • COLONOSCOPY N/A 6/24/2020    Procedure: COLONOSCOPY;  Surgeon: Julián Maldonado MD;  Location: [facility] ENDOSCOPY;  Service: Gastroenterology;  Laterality: N/A;   • CORONARY ARTERY BYPASS GRAFT  2005   • ENDOSCOPY N/A 10/19/2018    Procedure:  ESOPHAGOGASTRODUODENOSCOPY possible dilation;  Surgeon: Julián Maldonado MD;  Location: [facility] ENDOSCOPY;  Service: Gastroenterology   • ENDOSCOPY N/A 6/24/2020    Procedure: ESOPHAGOGASTRODUODENOSCOPY WED appt please;  Surgeon: Julián Maldonado MD;  Location: [facility] ENDOSCOPY;  Service: Gastroenterology;  Laterality: N/A;   • ENDOSCOPY AND COLONOSCOPY     • FOOT SURGERY      Toes   • FOOT SURGERY     • GASTRIC BANDING      Revision, laparoscopic   • HYSTERECTOMY     • INCISION AND DRAINAGE LEG Left 3/12/2021    Procedure: Left ankle arthroscopic irrigation and debridement, screw removal;  Surgeon: Ignacio Lord DPM;  Location: [facility] OR;  Service: Podiatry;  Laterality: Left;   • MOUTH SURGERY     • SALPINGO OOPHORECTOMY     • SHOULDER SURGERY     • SUBTALAR ARTHRODESIS Left 1/16/2019    Procedure: LEFT FOOT HARDWARE REMOVAL, FIFTH METATARSAL , OPEN REDUCTION INTERNAL FIXATION, CALCANEAL OSTEOTOMY;  Surgeon: Ignacio Lord DPM;  Location: [facility] OR;  Service: Podiatry   • SUBTALAR ARTHRODESIS Left 10/16/2019    Procedure: foot hardware removal, subtalar joint fusion  possible de/reattachment of achilles tendon        (c-arm);  Surgeon: Ignacio Lord DPM;  Location: [facility] OR;  Service: Podiatry   • SUBTALAR ARTHRODESIS Left 9/30/2020    Procedure: subtalar, talonavicular joint arthrodesis.  Removal hardware.          (c-arm);  Surgeon: Ignacio Lord DPM;  Location: [facility] OR;  Service: Podiatry;  Laterality: Left;   • TRANSESOPHAGEAL ECHOCARDIOGRAM (LAMONTE)      With color flow      Social History     Socioeconomic History   • Marital status:      Spouse name: Not on file   • Number of children: Not on file   • Years of education: Not on file   • Highest education level: Not on file   Tobacco Use   • Smoking status: Never Smoker   • Smokeless tobacco: Never Used   Vaping Use   • Vaping Use: Never used   Substance and Sexual Activity   • Alcohol use: No   • Drug use: No   • Sexual  activity: Defer      The following portions of the patient's history were reviewed and updated as appropriate: She  has a past medical history of Angina, class IV (CMS/[facility]), Anxiety, Anxiety and depression, Arthritis, Benign paroxysmal positional vertigo, Bladder disorder, Carpal tunnel syndrome, CHF (congestive heart failure) (CMS/[facility]), Chronic pain, Coronary atherosclerosis, Depression, Diabetes mellitus (CMS/[facility]), Diabetic polyneuropathy (CMS/[facility]), Disease of thyroid gland, Elevated cholesterol, Female stress incontinence, Foot pain, left, Full dentures, Gastroparesis, GERD (gastroesophageal reflux disease), Hyperlipidemia, Hypertension, Low back pain, Malaise and fatigue, Multiple joint pain, Obesity, Occlusion and stenosis of bilateral carotid arteries (6/18/2021), Otalgia, Perforation of tympanic membrane, Postoperative wound infection, Vitamin D deficiency, and Wears glasses..    Review of Systems   Constitutional: Negative.    HENT: Negative.  Negative for congestion.    Eyes: Negative.  Negative for blurred vision, double vision, photophobia and visual disturbance.   Respiratory: Negative.  Negative for cough, shortness of breath, wheezing and stridor.    Cardiovascular: Negative.  Negative for chest pain, palpitations and leg swelling.   Gastrointestinal: Negative.  Negative for abdominal distention, abdominal pain, blood in stool, constipation, diarrhea, nausea, vomiting, GERD and indigestion.   Endocrine: Negative.  Negative for cold intolerance and heat intolerance.   Genitourinary: Negative.  Negative for dysuria, flank pain, frequency and urinary incontinence.   Musculoskeletal: Positive for arthralgias. Negative for back pain.   Skin: Negative.    Allergic/Immunologic: Negative.  Negative for immunocompromised state.   Neurological: Negative.    Hematological: Negative.    Psychiatric/Behavioral: Negative.  Negative for agitation, behavioral problems, decreased concentration, dysphoric mood,  hallucinations, self-injury, sleep disturbance, suicidal ideas, negative for hyperactivity, depressed mood and stress. The patient is not nervous/anxious.    All other systems reviewed and are negative.    PHQ-9 Depression Screening  Little interest or pleasure in doing things?     Feeling down, depressed, or hopeless?     Trouble falling or staying asleep, or sleeping too much?     Feeling tired or having little energy?     Poor appetite or overeating?     Feeling bad about yourself - or that you are a failure or have let yourself or your family down?     Trouble concentrating on things, such as reading the newspaper or watching television?     Moving or speaking so slowly that other people could have noticed? Or the opposite - being so fidgety or restless that you have been moving around a lot more than usual?     Thoughts that you would be better off dead, or of hurting yourself in some way?     PHQ-9 Total Score     If you checked off any problems, how difficult have these problems made it for you to do your work, take care of things at home, or get along with other people?      Patient understands the risks associated with this controlled medication, including tolerance and addiction.  Patient also agrees to only obtain this medication from me, and not from a another provider, unless that provider is covering for me in my absence.  Patient also agrees to be compliant in dosing, and not self adjust the dose of medication.  A signed controlled substance agreement is on file, and the patient has received a controlled substance education sheet at this a previous visit.  The patient has also signed a consent for treatment with a controlled substance as per [facility] policy. LESTER was obtained.    Objective    Vitals:    07/19/21 1312   BP: 112/68   BP Location: Right arm   Patient Position: Sitting   Cuff Size: Adult   Pulse: 61   Resp: 16   Temp: 97.4 °F (36.3 °C)   SpO2: 99%   Weight: 102 kg (225  "lb)  Comment: per patient   Height: 172.7 cm (68\")   PainSc:   4   PainLoc: Head       Physical Exam  Vitals and nursing note reviewed.   Constitutional:       General: She is not in acute distress.     Appearance: Normal appearance. She is well-developed. She is obese. She is not ill-appearing or diaphoretic.   HENT:      Head: Normocephalic and atraumatic.      Right Ear: External ear normal.      Left Ear: External ear normal.      Nose: Nose normal.   Eyes:      General: Lids are normal. Lids are everted, no foreign bodies appreciated. No scleral icterus.        Right eye: No discharge.         Left eye: No discharge.      Conjunctiva/sclera: Conjunctivae normal.      Pupils: Pupils are equal, round, and reactive to light.   Neck:      Thyroid: No thyromegaly.      Vascular: No carotid bruit or JVD.      Trachea: No tracheal deviation.   Cardiovascular:      Rate and Rhythm: Normal rate and regular rhythm.      Pulses: Normal pulses.      Heart sounds: Murmur heard.   Systolic murmur is present with a grade of 2/6.   No friction rub. No gallop.    Pulmonary:      Effort: Pulmonary effort is normal. No respiratory distress.      Breath sounds: Normal breath sounds. No stridor. No wheezing, rhonchi or rales.   Chest:      Chest wall: No tenderness.   Abdominal:      General: Bowel sounds are normal.      Palpations: Abdomen is soft.   Musculoskeletal:         General: No tenderness or deformity. Normal range of motion.      Cervical back: Normal range of motion and neck supple. No rigidity or tenderness.      Comments: Presents with left residual leg immobilized in splint, sitting in wheelchair. Surgical site is not viewed due to dressings.       Left Lower Extremity: Left leg is amputated below knee. (5/31/21)  Lymphadenopathy:      Cervical: No cervical adenopathy.   Skin:     General: Skin is warm and dry.      Capillary Refill: Capillary refill takes 2 to 3 seconds.      Coloration: Skin is not jaundiced or " pale.      Findings: No bruising, erythema, lesion or rash.   Neurological:      General: No focal deficit present.      Mental Status: She is alert and oriented to person, place, and time. Mental status is at baseline.      Motor: No weakness.      Gait: Gait normal.   Psychiatric:         Mood and Affect: Mood normal.         Behavior: Behavior normal.         Thought Content: Thought content normal.         Judgment: Judgment normal.       Sepsis 2/2 acute colitis requiriing hospitalization from 7/2/21-7/12/21. Has done well since home from hospital. n    Assessment/Plan   Diagnoses and all orders for this visit:    1. Leukocytosis, unspecified type (Primary)  -     CBC w AUTO Differential; Future    2. CHON (acute kidney injury) (CMS/[facility])  -     Comprehensive metabolic panel; Future    3. Hospital discharge follow-up        Return in about 3 months (around 10/19/2021), or if symptoms worsen or fail to improve.             This document has been electronically signed by BEBE Palomino on July 19, 2021 14:23 CDT

## 2021-07-19 NOTE — PROGRESS NOTES
Chief Complaint   Patient presents with   • Heartburn   • gastroparesis       Subjective    Ariana Martinez is a 61 y.o. female. she is here today for follow-up.    History of Present Illness  61-year-old female presents for recheck regarding GERD and gastroparesis.  She recently was hospitalized for an episode of bacterial colitis treated with antibiotics symptoms have improved reports colon cancer now 2-3 times per day with no melena hematochezia or mucus within the stool describes them as normal caliber stool for her.  She was in the hospital prior to that for about a month due to foot amputation is still working with physical therapy.  Reports reflux is well controlled Protonix states gastroparesis is controlled with Reglan take it on as-needed basis       The following portions of the patient's history were reviewed and updated as appropriate:   Past Medical History:   Diagnosis Date   • Angina, class IV (CMS/HCC)    • Anxiety    • Anxiety and depression    • Arthritis    • Benign paroxysmal positional vertigo    • Bladder disorder     has bladder stimulator   • Carpal tunnel syndrome    • CHF (congestive heart failure) (CMS/HCC)    • Chronic pain    • Coronary atherosclerosis     hx CABG 2005.  is followed by Dr Kwon   • Depression    • Diabetes mellitus (CMS/HCC)     Type 2, controlled   • Diabetic polyneuropathy (CMS/HCC)    • Disease of thyroid gland    • Elevated cholesterol    • Female stress incontinence    • Foot pain, left    • Full dentures    • Gastroparesis    • GERD (gastroesophageal reflux disease)    • Hyperlipidemia    • Hypertension    • Low back pain    • Malaise and fatigue    • Multiple joint pain    • Obesity     Refuses to be weighed   • Occlusion and stenosis of bilateral carotid arteries 6/18/2021   • Otalgia     Both   • Perforation of tympanic membrane     Left   • Postoperative wound infection    • Vitamin D deficiency    • Wears glasses     reading     Past Surgical History:      Procedure Laterality Date   • ABDOMINAL SURGERY     • AMPUTATION FOOT     • ANGIOPLASTY      coronary   • ANKLE ARTHROSCOPY Left 2/26/2021    Procedure: Left foot hardware removal, ankle arthroscopy, bone grafting , left foot exostectomy;  Surgeon: Ignacio Lord DPM;  Location: [facility] OR;  Service: Podiatry;  Laterality: Left;   • BREAST BIOPSY Right    • CARDIAC CATHETERIZATION     • CARDIAC CATHETERIZATION N/A 6/20/2017    Procedure: Right Heart Cath;  Surgeon: Can Kwon MD PhD;  Location: [facility] CATH INVASIVE LOCATION;  Service:    • CARDIAC CATHETERIZATION N/A 2/18/2020    Procedure: Left Heart Cath;  Surgeon: Catalina Cooper MD;  Location: [facility] CATH INVASIVE LOCATION;  Service: Cardiology;  Laterality: N/A;   • CARPAL TUNNEL RELEASE     • CHOLECYSTECTOMY     • COLONOSCOPY N/A 6/24/2020    Procedure: COLONOSCOPY;  Surgeon: Julián Maldonado MD;  Location: [facility] ENDOSCOPY;  Service: Gastroenterology;  Laterality: N/A;   • CORONARY ARTERY BYPASS GRAFT  2005   • ENDOSCOPY N/A 10/19/2018    Procedure: ESOPHAGOGASTRODUODENOSCOPY possible dilation;  Surgeon: Julián Maldonado MD;  Location: [facility] ENDOSCOPY;  Service: Gastroenterology   • ENDOSCOPY N/A 6/24/2020    Procedure: ESOPHAGOGASTRODUODENOSCOPY WED appt please;  Surgeon: Julián Maldonado MD;  Location: [facility] ENDOSCOPY;  Service: Gastroenterology;  Laterality: N/A;   • ENDOSCOPY AND COLONOSCOPY     • FOOT SURGERY      Toes   • FOOT SURGERY     • GASTRIC BANDING      Revision, laparoscopic   • HYSTERECTOMY     • INCISION AND DRAINAGE LEG Left 3/12/2021    Procedure: Left ankle arthroscopic irrigation and debridement, screw removal;  Surgeon: Ignacio Lord DPM;  Location: [facility] OR;  Service: Podiatry;  Laterality: Left;   • MOUTH SURGERY     • SALPINGO OOPHORECTOMY     • SHOULDER SURGERY     • SUBTALAR ARTHRODESIS Left 1/16/2019    Procedure: LEFT FOOT HARDWARE REMOVAL, FIFTH METATARSAL , OPEN REDUCTION INTERNAL FIXATION,  CALCANEAL OSTEOTOMY;  Surgeon: Ignacio Lord DPM;  Location: [facility] OR;  Service: Podiatry   • SUBTALAR ARTHRODESIS Left 10/16/2019    Procedure: foot hardware removal, subtalar joint fusion  possible de/reattachment of achilles tendon        (c-arm);  Surgeon: Ignacio Lord DPM;  Location: [facility] OR;  Service: Podiatry   • SUBTALAR ARTHRODESIS Left 2020    Procedure: subtalar, talonavicular joint arthrodesis.  Removal hardware.          (c-arm);  Surgeon: Ignacio Lord DPM;  Location: [facility] OR;  Service: Podiatry;  Laterality: Left;   • TRANSESOPHAGEAL ECHOCARDIOGRAM (LAMONTE)      With color flow     Family History   Problem Relation Age of Onset   • Diabetes Other    • Heart disease Other    • Hypertension Other    • Heart disease Mother    • Stroke Mother    • Hypertension Mother    • Diabetes Sister    • Heart disease Sister    • Hypertension Sister    • Heart disease Sister    • Diabetes Sister    • Hypertension Sister    • Diabetes Sister    • Diabetes Sister    • Diabetes Sister    • Diabetes Sister      OB History        0    Para   0    Term   0       0    AB   0    Living   0       SAB   0    TAB   0    Ectopic   0    Molar        Multiple   0    Live Births                  Prior to Admission medications    Medication Sig Start Date End Date Taking? Authorizing Provider   Accu-Chek Iris Plus test strip USE TO CHECK BLOOD SUGAR 4 TIMES DAILY. E11.9 7/15/21  Yes Baldemar Melendez MD   ARIPiprazole (ABILIFY) 15 MG tablet TAKE 1 TABLET BY MOUTH EVERY DAY 21  Yes Kimberly Ferguson APRN   aspirin (aspirin) 81 MG EC tablet Take 81 mg by mouth Daily.   Yes Provider, MD Esther   atorvastatin (LIPITOR) 80 MG tablet TAKE 1 TABLET BY MOUTH EVERY DAY 21  Yes Kimberly Ferguson APRN   BD SHARPS CONTAINER HOME misc 1 each Take As Directed. 18  Yes Francisca Fong MD   Blood Glucose Monitoring Suppl (ONE TOUCH ULTRA MINI) w/Device kit USE AS DIRECTED  TO CHECK BLOOD SUGAR 7/11/18  Yes Francisca Fong MD   Calcium Citrate-Vitamin D (CITRACAL/VITAMIN D) 250-200 MG-UNIT tablet Take 2 tablets by mouth 2 (two) times a day.   Yes Esther Henao MD   clopidogrel (PLAVIX) 75 MG tablet TAKE 1 TABLET BY MOUTH EVERY DAY 6/14/21  Yes Kimberly Ferguson APRN   cyanocobalamin 1000 MCG/ML injection INJECT 1 ML INTO THE APPROPRIATE MUSCLE AS DIRECTED BY PRESCRIBER EVERY 28 (TWENTY-EIGHT) DAYS. 5/5/21  Yes Geri Baig MD   folic acid (FOLVITE) 1 MG tablet Take 1 tablet by mouth Daily. 4/22/21  Yes Teressa Hammond APRN   furosemide (LASIX) 40 MG tablet TAKE 1 TABLET BY MOUTH EVERY DAY 4/5/21  Yes Kimberly Ferguson APRN   gabapentin (NEURONTIN) 400 MG capsule Take 1 capsule by mouth 3 (Three) Times a Day. 4/20/21  Yes Kimberly Ferguson APRN   GLUCAGON EMERGENCY 1 MG injection USE AS DIRECTED AS NEEDED 7/28/17  Yes Elvis Gamboa APRN   glucose monitor monitoring kit 1 each Daily. accucheck eve meter, E11.9 6/11/20  Yes Elvis Gamboa APRN   hydroCHLOROthiazide (HYDRODIURIL) 25 MG tablet Take 25 mg by mouth Daily. 5/18/21  Yes Esther Henao MD   HYDROcodone-acetaminophen (NORCO) 7.5-325 MG per tablet Take 1 tablet by mouth Every 6 (Six) Hours As Needed for Moderate Pain . 6/24/21  Yes Kimberly Ferguson APRN   insulin aspart (NovoLOG FlexPen) 100 UNIT/ML solution pen-injector sc pen Inject 0 to 14 units under the skin into the appropriate area 3 (three) times daily before meals according to sliding scale on attached sheet. 7/12/21  Yes Brandon Martel MD   Insulin Glargine (BASAGLAR KWIKPEN) 100 UNIT/ML injection pen Inject 60 Units under the skin into the appropriate area as directed Every Night.   Yes Esther Henao MD   Insulin Pen Needle (B-D ULTRAFINE III SHORT PEN) 31G X 8 MM misc USE TO INJECT 4 TIMES A DAY 12/27/20  Yes Elvis Gamboa APRN   Insulin Pen Needle 31G X 8 MM misc Use up to 4 (four) times  "daily with insulin as directed. 7/12/21  Yes Brandon Martel MD   lisinopril (PRINIVIL,ZESTRIL) 20 MG tablet Take 1 tablet by mouth Daily. 6/24/21  Yes Kimberly Ferguson APRN   LORazepam (ATIVAN) 0.5 MG tablet Take 1 tablet by mouth Every 8 (Eight) Hours As Needed for Anxiety. Frequency and # increased on 6/24/21. Refill early accordingly. 6/24/21  Yes Kimberly Ferguson APRN   meclizine (ANTIVERT) 25 MG tablet Take 1 tablet by mouth 3 (Three) Times a Day As Needed for dizziness. 12/14/17  Yes Evon Hernandez APRN   methocarbamol (Robaxin) 500 MG tablet Take 1 tablet by mouth 2 (Two) Times a Day As Needed for Muscle Spasms. 6/24/21  Yes Kimberly Ferguson APRN   metoclopramide (REGLAN) 10 MG tablet TAKE 1 TABLET BY MOUTH FOUR TIMES A DAY AS NEEDED 5/17/21  Yes Marcela Lawson APRN   metoprolol succinate XL (Toprol XL) 50 MG 24 hr tablet Take 1 tablet by mouth Daily. Dose increased 5/24/21 5/24/21  Yes Kimberly Ferguson APRN   mirtazapine (Remeron) 30 MG tablet Take 1 tablet by mouth Every Night. 6/28/21  Yes Kimberly Ferguson APRN   naloxone (NARCAN) 0.4 MG/ML injection Infuse 0.5 mL into a venous catheter Every 5 (Five) Minutes As Needed for Opioid Reversal. 3/13/21  Yes Nick Faye MD   nitroglycerin (NITROSTAT) 0.4 MG SL tablet Place 1 tablet under the tongue Every 5 (Five) Minutes As Needed for Chest Pain. Take no more than 3 doses in 15 minutes. 4/29/21  Yes Kimberly Ferguson APRN   ondansetron (ZOFRAN) 8 MG tablet Take 1 tablet by mouth Every 8 (Eight) Hours As Needed for Nausea or Vomiting. 7/12/21  Yes Brandon Martel MD   pantoprazole (PROTONIX) 20 MG EC tablet TAKE 1 TABLET BY MOUTH EVERY DAY 12/23/20  Yes Geri Baig MD   Syringe/Needle, Disp, (Luer Lock Safety Syringes) 25G X 5/8\" 3 ML misc 1 each Daily. 1 Q28 DAYS 4/16/20  Yes Kimberly Ferguson APRN   tamsulosin (FLOMAX) 0.4 MG capsule 24 hr capsule Take 1 capsule by mouth Daily. 7/12/21  Yes Brandon Martel MD   " "  venlafaxine XR (EFFEXOR-XR) 150 MG 24 hr capsule TAKE 1 CAPSULE BY MOUTH TWICE A DAY 6/1/21  Yes Ferguson, BEBE Luu   vitamin D (ERGOCALCIFEROL) 1.25 MG (64163 UT) capsule capsule TAKE ONE CAPSULE BY MOUTH EVERY SUNDAY. 4/20/21  Yes Ferguson, BEBE Luu     Allergies   Allergen Reactions   • Seroquel [Quetiapine] Anaphylaxis   • Avandia [Rosiglitazone] Swelling   • Morphine And Related Hallucinations   • Oxycodone Hallucinations     Social History     Socioeconomic History   • Marital status:      Spouse name: Not on file   • Number of children: Not on file   • Years of education: Not on file   • Highest education level: Not on file   Tobacco Use   • Smoking status: Never Smoker   • Smokeless tobacco: Never Used   Vaping Use   • Vaping Use: Never used   Substance and Sexual Activity   • Alcohol use: No   • Drug use: No   • Sexual activity: Defer       Review of Systems  Review of Systems   Constitutional: Positive for fatigue. Negative for activity change, appetite change, chills, diaphoresis, fever and unexpected weight change.   HENT: Negative for sore throat and trouble swallowing.    Respiratory: Negative for shortness of breath.    Gastrointestinal: Positive for nausea. Negative for abdominal distention, abdominal pain, anal bleeding, blood in stool, constipation, diarrhea, rectal pain and vomiting.   Musculoskeletal: Negative for arthralgias.   Skin: Negative for pallor.   Neurological: Negative for light-headedness.        /64 (BP Location: Left arm)   Pulse 73   Ht 172.7 cm (68\")   Wt 102 kg (225 lb) Comment: patient reported  BMI 34.21 kg/m²     Objective    Physical Exam  Constitutional:       General: She is not in acute distress.     Appearance: Normal appearance. She is not ill-appearing.   HENT:      Head: Normocephalic and atraumatic.   Pulmonary:      Effort: Pulmonary effort is normal.   Abdominal:      General: Bowel sounds are normal. There is no distension.      " Palpations: Abdomen is soft.      Tenderness: There is no abdominal tenderness.       No results displayed because visit has over 200 results.        Assessment/Plan      1. Gastroparesis    2. Gastroesophageal reflux disease with esophagitis without hemorrhage    .   Continue Reglan on as-needed basis.  Discussed possible interaction with Abilify and risk of extrapyramidal  side effects patient has tolerated medications together before I would like to continue.  Discussed with patient the side effects are not reversible if they occur.  Continue Protonix daily for reflux avoid gastric irritants follow standard antireflux measures.  Discussed with patient if colitis/abdominal pain/blood in stool recurs will need to plan colonoscopy sooner however since symptoms are back to normal with treatment with antibiotics and colonoscopy was normal in 2021 continue with current surveillance plan. she is due 2023 for surveillance had colonoscopy with polypectomy 2020 otherwise colon appeared normal.    Orders placed during this encounter include:  No orders of the defined types were placed in this encounter.      * Surgery not found *    Review and/or summary of lab tests, radiology, procedures, medications. Review and summary of old records and obtaining of history. The risks and benefits of my recommendations, as well as other treatment options were discussed with the patient today. Questions were answered.    New Medications Ordered This Visit   Medications   • pantoprazole (PROTONIX) 20 MG EC tablet     Sig: Take 1 tablet by mouth Daily.     Dispense:  90 tablet     Refill:  3   • metoclopramide (REGLAN) 10 MG tablet     Sig: Take 1 tablet by mouth 4 (Four) Times a Day As Needed (nausea).     Dispense:  120 tablet     Refill:  1       Follow-up: Return in about 6 months (around 1/19/2022) for Recheck.          This document has been electronically signed by BEBE Leach on July 19, 2021 10:01 CDT           I spent 15  minutes caring for Ariana on this date of service. This time includes time spent by me in the following activities:preparing for the visit, reviewing tests, obtaining and/or reviewing a separately obtained history, performing a medically appropriate examination and/or evaluation , counseling and educating the patient/family/caregiver, ordering medications, tests, or procedures, referring and communicating with other health care professionals , documenting information in the medical record and care coordination    Results for orders placed or performed during the hospital encounter of 07/02/21   Gold Top - SST   Result Value Ref Range    Extra Tube Hold for add-ons.    COVID-19 and FLU A/B PCR - Swab, Nasopharynx    Specimen: Nasopharynx; Swab   Result Value Ref Range    COVID19 Not Detected Not Detected - Ref. Range    Influenza A PCR Not Detected Not Detected    Influenza B PCR Not Detected Not Detected   Urinalysis With Microscopic If Indicated (No Culture) - Urine, Catheter    Specimen: Urine, Catheter   Result Value Ref Range    Color, UA Yellow Yellow, Straw, Dark Yellow, Kristel    Appearance, UA Clear Clear    pH, UA 5.5 5.0 - 9.0    Specific Gravity, UA 1.010 1.003 - 1.030    Glucose, UA Negative Negative    Ketones, UA Negative Negative    Bilirubin, UA Negative Negative    Blood, UA Negative Negative    Protein, UA Negative Negative    Leuk Esterase, UA Negative Negative    Nitrite, UA Negative Negative    Urobilinogen, UA 0.2 E.U./dL 0.2 - 1.0 E.U./dL   Procalcitonin    Specimen: Blood   Result Value Ref Range    Procalcitonin 0.10 0.00 - 0.25 ng/mL   CBC Auto Differential    Specimen: Blood   Result Value Ref Range    WBC 8.50 3.40 - 10.80 10*3/mm3    RBC 3.78 3.77 - 5.28 10*6/mm3    Hemoglobin 10.6 (L) 12.0 - 15.9 g/dL    Hematocrit 33.3 (L) 34.0 - 46.6 %    MCV 88.1 79.0 - 97.0 fL    MCH 28.0 26.6 - 33.0 pg    MCHC 31.8 31.5 - 35.7 g/dL    RDW 14.9 12.3 - 15.4 %    RDW-SD 47.1 37.0 - 54.0 fl    MPV 9.3  6.0 - 12.0 fL    Platelets 425 140 - 450 10*3/mm3    Neutrophil % 59.0 42.7 - 76.0 %    Lymphocyte % 26.1 19.6 - 45.3 %    Monocyte % 8.2 5.0 - 12.0 %    Eosinophil % 5.8 0.3 - 6.2 %    Basophil % 0.5 0.0 - 1.5 %    Immature Grans % 0.4 0.0 - 0.5 %    Neutrophils, Absolute 5.02 1.70 - 7.00 10*3/mm3    Lymphocytes, Absolute 2.22 0.70 - 3.10 10*3/mm3    Monocytes, Absolute 0.70 0.10 - 0.90 10*3/mm3    Eosinophils, Absolute 0.49 (H) 0.00 - 0.40 10*3/mm3    Basophils, Absolute 0.04 0.00 - 0.20 10*3/mm3    Immature Grans, Absolute 0.03 0.00 - 0.05 10*3/mm3    nRBC 0.0 0.0 - 0.2 /100 WBC   CBC Auto Differential    Specimen: Blood   Result Value Ref Range    WBC 10.73 3.40 - 10.80 10*3/mm3    RBC 3.95 3.77 - 5.28 10*6/mm3    Hemoglobin 11.3 (L) 12.0 - 15.9 g/dL    Hematocrit 35.0 34.0 - 46.6 %    MCV 88.6 79.0 - 97.0 fL    MCH 28.6 26.6 - 33.0 pg    MCHC 32.3 31.5 - 35.7 g/dL    RDW 14.6 12.3 - 15.4 %    RDW-SD 46.0 37.0 - 54.0 fl    MPV 10.1 6.0 - 12.0 fL    Platelets 307 140 - 450 10*3/mm3    Neutrophil % 59.8 42.7 - 76.0 %    Lymphocyte % 26.0 19.6 - 45.3 %    Monocyte % 7.8 5.0 - 12.0 %    Eosinophil % 5.1 0.3 - 6.2 %    Basophil % 0.4 0.0 - 1.5 %    Immature Grans % 0.9 (H) 0.0 - 0.5 %    Neutrophils, Absolute 6.41 1.70 - 7.00 10*3/mm3    Lymphocytes, Absolute 2.79 0.70 - 3.10 10*3/mm3    Monocytes, Absolute 0.84 0.10 - 0.90 10*3/mm3    Eosinophils, Absolute 0.55 (H) 0.00 - 0.40 10*3/mm3    Basophils, Absolute 0.04 0.00 - 0.20 10*3/mm3    Immature Grans, Absolute 0.10 (H) 0.00 - 0.05 10*3/mm3    nRBC 0.0 0.0 - 0.2 /100 WBC   CBC Auto Differential    Specimen: Blood   Result Value Ref Range    WBC 8.72 3.40 - 10.80 10*3/mm3    RBC 3.52 (L) 3.77 - 5.28 10*6/mm3    Hemoglobin 10.0 (L) 12.0 - 15.9 g/dL    Hematocrit 30.5 (L) 34.0 - 46.6 %    MCV 86.6 79.0 - 97.0 fL    MCH 28.4 26.6 - 33.0 pg    MCHC 32.8 31.5 - 35.7 g/dL    RDW 14.5 12.3 - 15.4 %    RDW-SD 45.2 37.0 - 54.0 fl    MPV 9.2 6.0 - 12.0 fL    Platelets 337 140  - 450 10*3/mm3    Neutrophil % 62.7 42.7 - 76.0 %    Lymphocyte % 23.1 19.6 - 45.3 %    Monocyte % 7.1 5.0 - 12.0 %    Eosinophil % 6.3 (H) 0.3 - 6.2 %    Basophil % 0.5 0.0 - 1.5 %    Immature Grans % 0.3 0.0 - 0.5 %    Neutrophils, Absolute 5.47 1.70 - 7.00 10*3/mm3    Lymphocytes, Absolute 2.01 0.70 - 3.10 10*3/mm3    Monocytes, Absolute 0.62 0.10 - 0.90 10*3/mm3    Eosinophils, Absolute 0.55 (H) 0.00 - 0.40 10*3/mm3    Basophils, Absolute 0.04 0.00 - 0.20 10*3/mm3    Immature Grans, Absolute 0.03 0.00 - 0.05 10*3/mm3    nRBC 0.0 0.0 - 0.2 /100 WBC   CBC Auto Differential    Specimen: Blood   Result Value Ref Range    WBC 7.69 3.40 - 10.80 10*3/mm3    RBC 3.63 (L) 3.77 - 5.28 10*6/mm3    Hemoglobin 10.4 (L) 12.0 - 15.9 g/dL    Hematocrit 31.9 (L) 34.0 - 46.6 %    MCV 87.9 79.0 - 97.0 fL    MCH 28.7 26.6 - 33.0 pg    MCHC 32.6 31.5 - 35.7 g/dL    RDW 14.2 12.3 - 15.4 %    RDW-SD 44.8 37.0 - 54.0 fl    MPV 9.3 6.0 - 12.0 fL    Platelets 351 140 - 450 10*3/mm3    Neutrophil % 55.8 42.7 - 76.0 %    Lymphocyte % 28.7 19.6 - 45.3 %    Monocyte % 7.7 5.0 - 12.0 %    Eosinophil % 6.8 (H) 0.3 - 6.2 %    Basophil % 0.5 0.0 - 1.5 %    Immature Grans % 0.5 0.0 - 0.5 %    Neutrophils, Absolute 4.29 1.70 - 7.00 10*3/mm3    Lymphocytes, Absolute 2.21 0.70 - 3.10 10*3/mm3    Monocytes, Absolute 0.59 0.10 - 0.90 10*3/mm3    Eosinophils, Absolute 0.52 (H) 0.00 - 0.40 10*3/mm3    Basophils, Absolute 0.04 0.00 - 0.20 10*3/mm3    Immature Grans, Absolute 0.04 0.00 - 0.05 10*3/mm3    nRBC 0.0 0.0 - 0.2 /100 WBC     *Note: Due to a large number of results and/or encounters for the requested time period, some results have not been displayed. A complete set of results can be found in Results Review.

## 2021-07-20 DIAGNOSIS — R79.89 ELEVATED LFTS: Primary | ICD-10-CM

## 2021-07-27 ENCOUNTER — APPOINTMENT (OUTPATIENT)
Dept: ONCOLOGY | Facility: HOSPITAL | Age: 61
End: 2021-07-27

## 2021-07-27 ENCOUNTER — TELEPHONE (OUTPATIENT)
Dept: FAMILY MEDICINE CLINIC | Facility: CLINIC | Age: 61
End: 2021-07-27

## 2021-07-27 DIAGNOSIS — M54.41 CHRONIC RIGHT-SIDED LOW BACK PAIN WITH RIGHT-SIDED SCIATICA: ICD-10-CM

## 2021-07-27 DIAGNOSIS — G89.29 CHRONIC RIGHT-SIDED LOW BACK PAIN WITH RIGHT-SIDED SCIATICA: ICD-10-CM

## 2021-07-27 DIAGNOSIS — G54.6 PHANTOM PAIN AFTER AMPUTATION OF LOWER EXTREMITY (HCC): Chronic | ICD-10-CM

## 2021-07-27 RX ORDER — HYDROCODONE BITARTRATE AND ACETAMINOPHEN 7.5; 325 MG/1; MG/1
1 TABLET ORAL EVERY 6 HOURS PRN
Qty: 120 TABLET | Refills: 0 | Status: SHIPPED | OUTPATIENT
Start: 2021-07-27 | End: 2021-08-26 | Stop reason: SDUPTHER

## 2021-07-27 NOTE — TELEPHONE ENCOUNTER
Patient seen in office every 3 months for chronic pain requiring opiate pain medication. Compliant with medication, visits with no adverse effects noted. LESTER and UDS current and appropriate. Patient called requesting scheduled refill at appropriate interval. Patient understands the risks associated with this controlled medication, including tolerance and addiction.  Patient also agrees to only obtain this medication from me, and not from a another provider, unless that provider is covering for me in my absence.  Patient also agrees to be compliant in dosing, and not self adjust the dose of medication.  A signed controlled substance agreement is on file, and the patient has received a controlled substance education sheet at this a previous visit.  The patient has also signed a consent for treatment with a controlled substance as per [facility] policy. LESTER was obtained.   Refill sent for hydrocodone.     This document has been electronically signed by BEBE Palomino on July 27, 2021 13:13 CDT

## 2021-07-29 ENCOUNTER — LAB (OUTPATIENT)
Dept: ONCOLOGY | Facility: HOSPITAL | Age: 61
End: 2021-07-29

## 2021-07-29 ENCOUNTER — OFFICE VISIT (OUTPATIENT)
Dept: ONCOLOGY | Facility: CLINIC | Age: 61
End: 2021-07-29

## 2021-07-29 VITALS
HEART RATE: 79 BPM | TEMPERATURE: 97.1 F | DIASTOLIC BLOOD PRESSURE: 55 MMHG | SYSTOLIC BLOOD PRESSURE: 103 MMHG | OXYGEN SATURATION: 97 %

## 2021-07-29 DIAGNOSIS — E53.8 CYANOCOBALAMIN DEFICIENCY: ICD-10-CM

## 2021-07-29 DIAGNOSIS — D75.839 THROMBOCYTOSIS: Primary | ICD-10-CM

## 2021-07-29 DIAGNOSIS — E61.1 IRON DEFICIENCY: ICD-10-CM

## 2021-07-29 LAB
ALBUMIN SERPL-MCNC: 3.8 G/DL (ref 3.5–5.2)
ALBUMIN/GLOB SERPL: 1.2 G/DL
ALP SERPL-CCNC: 120 U/L (ref 39–117)
ALT SERPL W P-5'-P-CCNC: 38 U/L (ref 1–33)
ANION GAP SERPL CALCULATED.3IONS-SCNC: 13 MMOL/L (ref 5–15)
AST SERPL-CCNC: 32 U/L (ref 1–32)
BILIRUB SERPL-MCNC: 0.2 MG/DL (ref 0–1.2)
BUN SERPL-MCNC: 20 MG/DL (ref 6–20)
BUN/CREAT SERPL: 19.6 (ref 7–25)
CALCIUM SPEC-SCNC: 9.2 MG/DL (ref 8.6–10.5)
CHLORIDE SERPL-SCNC: 95 MMOL/L (ref 98–107)
CO2 SERPL-SCNC: 23 MMOL/L (ref 22–29)
CREAT SERPL-MCNC: 1.02 MG/DL (ref 0.57–1)
FERRITIN SERPL-MCNC: 233.3 NG/ML (ref 13–150)
FOLATE SERPL-MCNC: >20 NG/ML (ref 4.78–24.2)
GFR SERPL CREATININE-BSD FRML MDRD: 55 ML/MIN/1.73
GLOBULIN UR ELPH-MCNC: 3.3 GM/DL
GLUCOSE SERPL-MCNC: 299 MG/DL (ref 65–99)
IRON 24H UR-MRATE: 64 MCG/DL (ref 37–145)
IRON SATN MFR SERPL: 22 % (ref 20–50)
POTASSIUM SERPL-SCNC: 4.6 MMOL/L (ref 3.5–5.2)
PROT SERPL-MCNC: 7.1 G/DL (ref 6–8.5)
SODIUM SERPL-SCNC: 131 MMOL/L (ref 136–145)
TIBC SERPL-MCNC: 289 MCG/DL (ref 298–536)
TRANSFERRIN SERPL-MCNC: 194 MG/DL (ref 200–360)
VIT B12 BLD-MCNC: 580 PG/ML (ref 211–946)

## 2021-07-29 PROCEDURE — 83540 ASSAY OF IRON: CPT

## 2021-07-29 PROCEDURE — 99212 OFFICE O/P EST SF 10 MIN: CPT | Performed by: NURSE PRACTITIONER

## 2021-07-29 PROCEDURE — 84466 ASSAY OF TRANSFERRIN: CPT

## 2021-07-29 PROCEDURE — 82746 ASSAY OF FOLIC ACID SERUM: CPT

## 2021-07-29 PROCEDURE — 82607 VITAMIN B-12: CPT

## 2021-07-29 PROCEDURE — 80053 COMPREHEN METABOLIC PANEL: CPT

## 2021-07-29 PROCEDURE — G0463 HOSPITAL OUTPT CLINIC VISIT: HCPCS | Performed by: NURSE PRACTITIONER

## 2021-07-29 PROCEDURE — 82728 ASSAY OF FERRITIN: CPT

## 2021-07-30 NOTE — PROGRESS NOTES
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  Due to iron deficiency and inability to tolerate iron by mouth, patient received 2 dose of intravenous Feraheme on October 23, 2019 and October 30, 2019.    Recently seen in May and iron stores were dropping again and patient was given 2 additional doses of IV iron on 6/4/2020 and 6/12/2020. She also had upper and lower endoscopies 6/24/2020 with no identifiable source of bleeding     Since here last she has been hospitalized with foot infection and had a left BKA due to sepsis.      Past Medical History, Past Surgical History, Social History, Family History have been reviewed and are without significant changes except as mentioned.    Review of Systems   A comprehensive 14 point review of systems was performed and was negative except as mentioned.  +fatigue  She is in wheelchair today due to BKA but states she will be getting her prosthesis very soon  Medications:  The current medication list was reviewed in the EMR    ALLERGIES:    Allergies   Allergen Reactions   • Seroquel [Quetiapine] Anaphylaxis   • Avandia [Rosiglitazone] Swelling   • Morphine And Related Hallucinations   • Oxycodone Hallucinations       Objective      Vitals:    07/29/21 1401   BP: 103/55   Pulse: 79   Temp: 97.1 °F (36.2 °C)   SpO2: 97%   PainSc:   3   PainLoc: Leg  Comment: lower LT leg     Current Status 4/22/2021   ECOG score 4       Physical Exam  1.  Thrombocytosis:  - Patient has intermittent thrombocytosis since 2015.  -Platelet count done  today 4/22/2021 is normal at 392,000.  - Extensive work-up including Tony 2 mutation with exon 12, CALR mutation, MPL mutation done on September 30, 2019 is negative.  - At this point will continue with clinical monitoring.  Will ask patient to return to clinic in 4 months with repeat CBC and anemia work-up to be done     2.  Leukocytosis:  - Patient has persistent leukocytosis since 2015.  -White blood cell count is stable at 10,420.  CALR mutation, MPL mutation, Tony 2 mutation including exon 12 were negative.  At this point will continue with clinical monitoring.  - If patient has any worsening leukocytosis or thrombocytosis in future, she will need bone marrow biopsy which was discussed with patient.     3.  Iron deficiency:  -Patient is found to have iron deficiency with iron saturation of only 11% and ferritin of 46.  Patient states in the past she is not able to tolerate iron by mouth.  -Patient has received multiple doses of IV iron; most recently in June 2020 with dropping iron stores.  -Hgb today is 12.7 with stable iron stores; continue to monitor.  -continue with  monthly B12 injection by primary medical provider.  -will send prescription for folic acid 1 mg daily  -will recheck labs again in 4 mos.          RECENT LABS:  Glucose   Date Value Ref Range Status   07/29/2021 299 (H) 65 - 99 mg/dL Final     Glucose, Arterial   Date Value Ref Range Status   10/14/2017 155 mmol/L Final     Sodium   Date Value Ref Range Status   07/29/2021 131 (L) 136 - 145 mmol/L Final     Potassium   Date Value Ref Range Status   07/29/2021 4.6 3.5 - 5.2 mmol/L Final   03/18/2021 3.6 3.5 - 5.4 MMOL/L Final     CO2   Date Value Ref Range Status   07/29/2021 23.0 22.0 - 29.0 mmol/L Final     Chloride   Date Value Ref Range Status   07/29/2021 95 (L) 98 - 107 mmol/L Final     Anion Gap   Date Value Ref Range Status   07/29/2021 13.0 5.0 - 15.0 mmol/L Final     Creatinine   Date Value Ref Range Status   07/29/2021 1.02 (H) 0.57 -  1.00 mg/dL Final     BUN   Date Value Ref Range Status   07/29/2021 20 6 - 20 mg/dL Final     BUN/Creatinine Ratio   Date Value Ref Range Status   07/29/2021 19.6 7.0 - 25.0 Final     Calcium   Date Value Ref Range Status   07/29/2021 9.2 8.6 - 10.5 mg/dL Final     eGFR Non  Amer   Date Value Ref Range Status   07/29/2021 55 (L) >60 mL/min/1.73 Final     Alkaline Phosphatase   Date Value Ref Range Status   07/29/2021 120 (H) 39 - 117 U/L Final     Total Protein   Date Value Ref Range Status   07/29/2021 7.1 6.0 - 8.5 g/dL Final     ALT (SGPT)   Date Value Ref Range Status   07/29/2021 38 (H) 1 - 33 U/L Final     AST (SGOT)   Date Value Ref Range Status   07/29/2021 32 1 - 32 U/L Final     Total Bilirubin   Date Value Ref Range Status   07/29/2021 0.2 0.0 - 1.2 mg/dL Final     Albumin   Date Value Ref Range Status   07/29/2021 3.80 3.50 - 5.20 g/dL Final     Globulin   Date Value Ref Range Status   07/29/2021 3.3 gm/dL Final     Lab Results   Component Value Date    WBC 9.37 07/19/2021    HGB 14.0 07/19/2021    HCT 41.0 07/19/2021    MCV 86.9 07/19/2021     07/19/2021     Lab Results   Component Value Date    NEUTROABS 6.09 07/19/2021    IRON 64 07/29/2021    IRON 54 04/22/2021    IRON 89 12/14/2020    TIBC 289 (L) 07/29/2021    TIBC 279 (L) 04/22/2021    TIBC 291 (L) 12/14/2020    LABIRON 22 07/29/2021    LABIRON 19 (L) 04/22/2021    LABIRON 31 12/14/2020    FERRITIN 233.30 (H) 07/29/2021    FERRITIN 351.00 (H) 04/22/2021    FERRITIN 379.30 (H) 12/14/2020    MNZLKURT13 580 07/29/2021    [id number] 573 04/22/2021    [id number] 748 03/05/2021    FOLATE >20.00 07/29/2021    FOLATE >20.00 04/22/2021    FOLATE 10.30 12/14/2020     Lab Results   Component Value Date    REFLABREPO SEE ATTACHED REPORT 09/30/2019    REFLABREPO SEE ATTACHED REPORT 09/30/2019             RADIOLOGY DATA :  No radiology results for the last 7 days        Assessment/Plan       1.  Thrombocytosis:  - Patient has intermittent  thrombocytosis since 2015.  -Platelet count done today 4/22/2021 is normal at 444,000.  - Extensive work-up including Tony 2 mutation with exon 12, CALR mutation, MPL mutation done on September 30, 2019 is negative.  - At this point will continue with clinical monitoring.  Will ask patient to return to clinic in 4 months with repeat CBC and anemia work-up to be done     2.  Leukocytosis:  - Patient has persistent leukocytosis since 2015.  -White blood cell count is stable at 9,000.  CALR mutation, MPL mutation, Tony 2 mutation including exon 12 were negative.  At this point will continue with clinical monitoring.  - If patient has any worsening leukocytosis or thrombocytosis in future, she will need bone marrow biopsy which was discussed with patient.     3.  Iron deficiency:  -Patient is found to have iron deficiency with iron saturation of only 11% and ferritin of 46.  Patient states in the past she is not able to tolerate iron by mouth.  -Patient has received multiple doses of IV iron; most recently in June 2020 with dropping iron stores.  -Hgb today is 14.0 with stable iron stores; continue to monitor.  -continue with  monthly B12 injection by primary medical provider.  -will send prescription for folic acid 1 mg daily  -will recheck labs again in 4 mos.                   PHQ-9 Total Score: 0     Ariana Martinez reports a pain score of 3.  Given her pain assessment as noted, treatment options were discussed and the following options were decided upon as a follow-up plan to address the patient's pain: continuation of current treatment plan for pain.         Teressa Hammond, BEBE  7/30/2021  08:30 CDT        Part of this note may be an electronic transcription/translation of spoken language to printed text using the Dragon Dictation System.          CC:

## 2021-08-11 ENCOUNTER — LAB (OUTPATIENT)
Dept: LAB | Facility: HOSPITAL | Age: 61
End: 2021-08-11

## 2021-08-11 DIAGNOSIS — R79.89 ELEVATED LFTS: ICD-10-CM

## 2021-08-11 DIAGNOSIS — E55.9 VITAMIN D DEFICIENCY: ICD-10-CM

## 2021-08-11 DIAGNOSIS — IMO0002 UNCONTROLLED TYPE 2 DIABETES MELLITUS WITH NEUROLOGIC COMPLICATION, WITH LONG-TERM CURRENT USE OF INSULIN: ICD-10-CM

## 2021-08-11 DIAGNOSIS — I10 ESSENTIAL HYPERTENSION: ICD-10-CM

## 2021-08-11 DIAGNOSIS — E78.2 MIXED HYPERLIPIDEMIA: ICD-10-CM

## 2021-08-11 LAB
25(OH)D3 SERPL-MCNC: 32.6 NG/ML
ALBUMIN SERPL-MCNC: 4 G/DL (ref 3.5–5.2)
ALBUMIN/GLOB SERPL: 1.6 G/DL
ALP SERPL-CCNC: 107 U/L (ref 39–117)
ALT SERPL W P-5'-P-CCNC: 17 U/L (ref 1–33)
ANION GAP SERPL CALCULATED.3IONS-SCNC: 12.6 MMOL/L (ref 5–15)
AST SERPL-CCNC: 11 U/L (ref 1–32)
BASOPHILS # BLD AUTO: 0.06 10*3/MM3 (ref 0–0.2)
BASOPHILS NFR BLD AUTO: 0.3 % (ref 0–1.5)
BILIRUB CONJ SERPL-MCNC: <0.2 MG/DL (ref 0–0.3)
BILIRUB SERPL-MCNC: 0.2 MG/DL (ref 0–1.2)
BUN SERPL-MCNC: 22 MG/DL (ref 6–20)
BUN/CREAT SERPL: 24.7 (ref 7–25)
CALCIUM SPEC-SCNC: 9.5 MG/DL (ref 8.6–10.5)
CHLORIDE SERPL-SCNC: 98 MMOL/L (ref 98–107)
CHOLEST SERPL-MCNC: 151 MG/DL (ref 0–200)
CO2 SERPL-SCNC: 26.4 MMOL/L (ref 22–29)
CREAT SERPL-MCNC: 0.89 MG/DL (ref 0.57–1)
DEPRECATED RDW RBC AUTO: 43.1 FL (ref 37–54)
EOSINOPHIL # BLD AUTO: 0.05 10*3/MM3 (ref 0–0.4)
EOSINOPHIL NFR BLD AUTO: 0.2 % (ref 0.3–6.2)
ERYTHROCYTE [DISTWIDTH] IN BLOOD BY AUTOMATED COUNT: 14.3 % (ref 12.3–15.4)
GFR SERPL CREATININE-BSD FRML MDRD: 65 ML/MIN/1.73
GLOBULIN UR ELPH-MCNC: 2.5 GM/DL
GLUCOSE SERPL-MCNC: 105 MG/DL (ref 65–99)
HBA1C MFR BLD: 8.9 % (ref 4.8–5.6)
HCT VFR BLD AUTO: 36.9 % (ref 34–46.6)
HDLC SERPL-MCNC: 52 MG/DL (ref 40–60)
HGB BLD-MCNC: 12.6 G/DL (ref 12–15.9)
IMM GRANULOCYTES # BLD AUTO: 0.12 10*3/MM3 (ref 0–0.05)
IMM GRANULOCYTES NFR BLD AUTO: 0.6 % (ref 0–0.5)
LDLC SERPL CALC-MCNC: 85 MG/DL (ref 0–100)
LDLC/HDLC SERPL: 1.63 {RATIO}
LYMPHOCYTES # BLD AUTO: 3.25 10*3/MM3 (ref 0.7–3.1)
LYMPHOCYTES NFR BLD AUTO: 15.2 % (ref 19.6–45.3)
MCH RBC QN AUTO: 28.8 PG (ref 26.6–33)
MCHC RBC AUTO-ENTMCNC: 34.1 G/DL (ref 31.5–35.7)
MCV RBC AUTO: 84.4 FL (ref 79–97)
MONOCYTES # BLD AUTO: 1.04 10*3/MM3 (ref 0.1–0.9)
MONOCYTES NFR BLD AUTO: 4.9 % (ref 5–12)
NEUTROPHILS NFR BLD AUTO: 16.82 10*3/MM3 (ref 1.7–7)
NEUTROPHILS NFR BLD AUTO: 78.8 % (ref 42.7–76)
NRBC BLD AUTO-RTO: 0 /100 WBC (ref 0–0.2)
PLATELET # BLD AUTO: 347 10*3/MM3 (ref 140–450)
PMV BLD AUTO: 11.5 FL (ref 6–12)
POTASSIUM SERPL-SCNC: 3.9 MMOL/L (ref 3.5–5.2)
PROT SERPL-MCNC: 6.5 G/DL (ref 6–8.5)
RBC # BLD AUTO: 4.37 10*6/MM3 (ref 3.77–5.28)
SODIUM SERPL-SCNC: 137 MMOL/L (ref 136–145)
TRIGL SERPL-MCNC: 72 MG/DL (ref 0–150)
VIT B12 BLD-MCNC: 579 PG/ML (ref 211–946)
VLDLC SERPL-MCNC: 14 MG/DL (ref 5–40)
WBC # BLD AUTO: 21.34 10*3/MM3 (ref 3.4–10.8)

## 2021-08-11 PROCEDURE — 82607 VITAMIN B-12: CPT

## 2021-08-11 PROCEDURE — 82248 BILIRUBIN DIRECT: CPT

## 2021-08-11 PROCEDURE — 80061 LIPID PANEL: CPT

## 2021-08-11 PROCEDURE — 85025 COMPLETE CBC W/AUTO DIFF WBC: CPT

## 2021-08-11 PROCEDURE — 82306 VITAMIN D 25 HYDROXY: CPT

## 2021-08-11 PROCEDURE — 84443 ASSAY THYROID STIM HORMONE: CPT

## 2021-08-11 PROCEDURE — 80053 COMPREHEN METABOLIC PANEL: CPT

## 2021-08-11 PROCEDURE — 36415 COLL VENOUS BLD VENIPUNCTURE: CPT

## 2021-08-11 PROCEDURE — 83036 HEMOGLOBIN GLYCOSYLATED A1C: CPT

## 2021-08-12 LAB — TSH SERPL DL<=0.05 MIU/L-ACNC: 1.41 UIU/ML (ref 0.27–4.2)

## 2021-08-19 DIAGNOSIS — G47.00 INSOMNIA, UNSPECIFIED TYPE: ICD-10-CM

## 2021-08-19 RX ORDER — MIRTAZAPINE 30 MG/1
30 TABLET, FILM COATED ORAL NIGHTLY
Qty: 90 TABLET | Refills: 1 | Status: SHIPPED | OUTPATIENT
Start: 2021-08-19 | End: 2021-12-16

## 2021-08-24 RX ORDER — VENLAFAXINE HYDROCHLORIDE 150 MG/1
CAPSULE, EXTENDED RELEASE ORAL
Qty: 90 CAPSULE | Refills: 1 | Status: SHIPPED | OUTPATIENT
Start: 2021-08-24 | End: 2021-11-19

## 2021-08-25 ENCOUNTER — TELEPHONE (OUTPATIENT)
Dept: ENDOCRINOLOGY | Facility: CLINIC | Age: 61
End: 2021-08-25

## 2021-08-25 NOTE — TELEPHONE ENCOUNTER
Pt left a vm stating that she is needing a call back from a nurse about a refill she needs but it is different than before.

## 2021-08-26 ENCOUNTER — TELEPHONE (OUTPATIENT)
Dept: ENDOCRINOLOGY | Facility: CLINIC | Age: 61
End: 2021-08-26

## 2021-08-26 DIAGNOSIS — G54.6 PHANTOM PAIN AFTER AMPUTATION OF LOWER EXTREMITY (HCC): Chronic | ICD-10-CM

## 2021-08-26 DIAGNOSIS — G89.29 CHRONIC RIGHT-SIDED LOW BACK PAIN WITH RIGHT-SIDED SCIATICA: ICD-10-CM

## 2021-08-26 DIAGNOSIS — M54.41 CHRONIC RIGHT-SIDED LOW BACK PAIN WITH RIGHT-SIDED SCIATICA: ICD-10-CM

## 2021-08-26 RX ORDER — HYDROCODONE BITARTRATE AND ACETAMINOPHEN 7.5; 325 MG/1; MG/1
1 TABLET ORAL EVERY 6 HOURS PRN
Qty: 120 TABLET | Refills: 0 | Status: SHIPPED | OUTPATIENT
Start: 2021-08-26 | End: 2021-09-09 | Stop reason: SDUPTHER

## 2021-08-26 RX ORDER — PEN NEEDLE, DIABETIC 31 GX5/16"
NEEDLE, DISPOSABLE MISCELLANEOUS
Qty: 150 EACH | Refills: 11 | Status: SHIPPED | OUTPATIENT
Start: 2021-08-26 | End: 2022-11-11

## 2021-08-26 NOTE — TELEPHONE ENCOUNTER
Pt needs new script is having to take 5 shots a day since she got out of hospital     Pen Needles 5 times daily     [facility] [place]    Pt completely out

## 2021-08-26 NOTE — TELEPHONE ENCOUNTER
Incoming Refill Request      Medication requested (name and dose):   HYDROcodone-acetaminophen (NORCO) 7.5-325 MG per tablet         Pharmacy where request should be sent:   [facility] [email or web address]    Additional details provided by patient:     Best call back number:     Does the patient have less than a 3 day supply:  [] Yes  [] No    Tiffanie Vaughan  08/26/21, 09:41 CDT

## 2021-09-09 ENCOUNTER — OFFICE VISIT (OUTPATIENT)
Dept: FAMILY MEDICINE CLINIC | Facility: CLINIC | Age: 61
End: 2021-09-09

## 2021-09-09 VITALS
HEART RATE: 86 BPM | SYSTOLIC BLOOD PRESSURE: 100 MMHG | OXYGEN SATURATION: 99 % | TEMPERATURE: 97.8 F | RESPIRATION RATE: 16 BRPM | BODY MASS INDEX: 34.21 KG/M2 | DIASTOLIC BLOOD PRESSURE: 60 MMHG | HEIGHT: 68 IN

## 2021-09-09 DIAGNOSIS — G54.6 PHANTOM PAIN AFTER AMPUTATION OF LOWER EXTREMITY (HCC): Chronic | ICD-10-CM

## 2021-09-09 DIAGNOSIS — Z89.512 S/P BKA (BELOW KNEE AMPUTATION) UNILATERAL, LEFT (HCC): ICD-10-CM

## 2021-09-09 DIAGNOSIS — H74.8X1 HEMOTYMPANUM, RIGHT: ICD-10-CM

## 2021-09-09 DIAGNOSIS — G89.29 CHRONIC RIGHT-SIDED LOW BACK PAIN WITH RIGHT-SIDED SCIATICA: ICD-10-CM

## 2021-09-09 DIAGNOSIS — M54.41 CHRONIC RIGHT-SIDED LOW BACK PAIN WITH RIGHT-SIDED SCIATICA: ICD-10-CM

## 2021-09-09 DIAGNOSIS — M54.41 CHRONIC RIGHT-SIDED LOW BACK PAIN WITH RIGHT-SIDED SCIATICA: Chronic | ICD-10-CM

## 2021-09-09 DIAGNOSIS — G89.29 CHRONIC RIGHT-SIDED LOW BACK PAIN WITH RIGHT-SIDED SCIATICA: Chronic | ICD-10-CM

## 2021-09-09 DIAGNOSIS — Z47.89 ENCOUNTER FOR PROSTHETIC GAIT TRAINING: Primary | ICD-10-CM

## 2021-09-09 DIAGNOSIS — F41.1 GENERALIZED ANXIETY DISORDER: ICD-10-CM

## 2021-09-09 PROCEDURE — 99214 OFFICE O/P EST MOD 30 MIN: CPT | Performed by: NURSE PRACTITIONER

## 2021-09-09 RX ORDER — GABAPENTIN 400 MG/1
400 CAPSULE ORAL 3 TIMES DAILY
Qty: 90 CAPSULE | Refills: 2 | Status: SHIPPED | OUTPATIENT
Start: 2021-09-09 | End: 2021-12-02 | Stop reason: SDUPTHER

## 2021-09-09 RX ORDER — HYDROCODONE BITARTRATE AND ACETAMINOPHEN 7.5; 325 MG/1; MG/1
1 TABLET ORAL EVERY 6 HOURS PRN
Qty: 120 TABLET | Refills: 0 | Status: SHIPPED | OUTPATIENT
Start: 2021-09-09 | End: 2021-10-25 | Stop reason: SDUPTHER

## 2021-09-09 RX ORDER — OFLOXACIN 3 MG/ML
5 SOLUTION AURICULAR (OTIC) 2 TIMES DAILY
Qty: 10 ML | Refills: 1 | Status: SHIPPED | OUTPATIENT
Start: 2021-09-09 | End: 2022-02-07 | Stop reason: HOSPADM

## 2021-09-09 RX ORDER — LORAZEPAM 0.5 MG/1
0.5 TABLET ORAL EVERY 8 HOURS PRN
Qty: 90 TABLET | Refills: 0 | Status: SHIPPED | OUTPATIENT
Start: 2021-09-09 | End: 2021-10-11

## 2021-09-09 RX ORDER — METOCLOPRAMIDE 10 MG/1
10 TABLET ORAL 4 TIMES DAILY PRN
Qty: 120 TABLET | Refills: 1 | Status: SHIPPED | OUTPATIENT
Start: 2021-09-09 | End: 2021-11-01

## 2021-09-09 NOTE — PROGRESS NOTES
"Subjective   Ariana Luis Martinez is a 61 y.o. female.       Chief Complaint   Patient presents with   • Pain     12 week f/iu   • Earache        History of Present Illness   Here for routine follow up of chronic lower back pain with right sided sided sciatica managed with hydrocodone. Due for refill today.     Phantom pain left BKA managed with gabapentin with adequate relief. Due for refill today. Needs referral for [facility] in patient rehab for ambulation/transfer training with new left BKA prosthetic to begin soon. Last seen by Elvis Velazquez PA-C [facility] in follow up L BKA on 8/20/2021. Would like to be be in patient rather than out patient for rehabilitation services due to inability to drive and more comprehensive care available if in patient. Has been a patient with [facility] several times this past year.   From Ms Velazquez; notes of 8/20/21: \" Ms. Martinez is a 61 y.o. female with history of carotid artery stenosis, diabetes and nonhealing left foot wounds for the last 3 years. She has history of ORIF of left ankle fracture and has had problems since that time. She had removal of infected hardware in March 2021. Since then she had difficulty with nonhealing wound was found to have osteomyelitis within left hallux and calcaneus. She became septic and podiatry felt wounds were nonsalvageable. She underwent left BKA 05/31/2021. She is currently wearing stump . She reports she has been fitted for her prosthesis. There is very small scabbed area present to anterior stump.\"     MAURICIO managed with Abilify, Effexor and PRN lorazepam with adequate reported relief of symptoms. Due for refill lorazepam today.     No new complaints today.    Parts of most recent relevant visit HPI, ROS  and PE may be carried forward and all are updated as appropriate for current situation.    Past Surgical History:   Procedure Laterality Date   • ABDOMINAL SURGERY     • AMPUTATION FOOT     • ANGIOPLASTY      " coronary   • ANKLE ARTHROSCOPY Left 2/26/2021    Procedure: Left foot hardware removal, ankle arthroscopy, bone grafting , left foot exostectomy;  Surgeon: Ignacio Lord DPM;  Location: [facility] OR;  Service: Podiatry;  Laterality: Left;   • BREAST BIOPSY Right    • CARDIAC CATHETERIZATION     • CARDIAC CATHETERIZATION N/A 6/20/2017    Procedure: Right Heart Cath;  Surgeon: Can Kwon MD PhD;  Location: [facility] CATH INVASIVE LOCATION;  Service:    • CARDIAC CATHETERIZATION N/A 2/18/2020    Procedure: Left Heart Cath;  Surgeon: Catalina Cooper MD;  Location: [facility] CATH INVASIVE LOCATION;  Service: Cardiology;  Laterality: N/A;   • CARPAL TUNNEL RELEASE     • CHOLECYSTECTOMY     • COLONOSCOPY N/A 6/24/2020    Procedure: COLONOSCOPY;  Surgeon: Julián Maldonado MD;  Location: [facility] ENDOSCOPY;  Service: Gastroenterology;  Laterality: N/A;   • CORONARY ARTERY BYPASS GRAFT  2005   • ENDOSCOPY N/A 10/19/2018    Procedure: ESOPHAGOGASTRODUODENOSCOPY possible dilation;  Surgeon: Julián Maldonado MD;  Location: [facility] ENDOSCOPY;  Service: Gastroenterology   • ENDOSCOPY N/A 6/24/2020    Procedure: ESOPHAGOGASTRODUODENOSCOPY WED appt please;  Surgeon: Julián Maldonado MD;  Location: [facility] ENDOSCOPY;  Service: Gastroenterology;  Laterality: N/A;   • ENDOSCOPY AND COLONOSCOPY     • FOOT SURGERY      Toes   • FOOT SURGERY     • GASTRIC BANDING      Revision, laparoscopic   • HYSTERECTOMY     • INCISION AND DRAINAGE LEG Left 3/12/2021    Procedure: Left ankle arthroscopic irrigation and debridement, screw removal;  Surgeon: Ignacio Lord DPM;  Location: [facility] OR;  Service: Podiatry;  Laterality: Left;   • MOUTH SURGERY     • SALPINGO OOPHORECTOMY     • SHOULDER SURGERY     • SUBTALAR ARTHRODESIS Left 1/16/2019    Procedure: LEFT FOOT HARDWARE REMOVAL, FIFTH METATARSAL , OPEN REDUCTION INTERNAL FIXATION, CALCANEAL OSTEOTOMY;  Surgeon: Ignacio Lord DPM;  Location: [facility] OR;  Service: Podiatry   •  SUBTALAR ARTHRODESIS Left 10/16/2019    Procedure: foot hardware removal, subtalar joint fusion  possible de/reattachment of achilles tendon        (c-arm);  Surgeon: Ignacio Lord DPM;  Location: [facility];  Service: Podiatry   • SUBTALAR ARTHRODESIS Left 9/30/2020    Procedure: subtalar, talonavicular joint arthrodesis.  Removal hardware.          (c-arm);  Surgeon: Ignacio Lord DPM;  Location: [facility];  Service: Podiatry;  Laterality: Left;   • TRANSESOPHAGEAL ECHOCARDIOGRAM (LAMONTE)      With color flow      Social History     Socioeconomic History   • Marital status:      Spouse name: Not on file   • Number of children: Not on file   • Years of education: Not on file   • Highest education level: Not on file   Tobacco Use   • Smoking status: Never Smoker   • Smokeless tobacco: Never Used   Vaping Use   • Vaping Use: Never used   Substance and Sexual Activity   • Alcohol use: No   • Drug use: No   • Sexual activity: Defer      The following portions of the patient's history were reviewed and updated as appropriate: She  has a past medical history of Angina, class IV (CMS/HCC), Anxiety, Anxiety and depression, Arthritis, Benign paroxysmal positional vertigo, Bladder disorder, Carpal tunnel syndrome, CHF (congestive heart failure) (CMS/HCC), Chronic pain, Coronary atherosclerosis, Depression, Diabetes mellitus (CMS/HCC), Diabetic polyneuropathy (CMS/HCC), Disease of thyroid gland, Elevated cholesterol, Female stress incontinence, Foot pain, left, Full dentures, Gastroparesis, GERD (gastroesophageal reflux disease), Hyperlipidemia, Hypertension, Low back pain, Malaise and fatigue, Multiple joint pain, Obesity, Occlusion and stenosis of bilateral carotid arteries (6/18/2021), Otalgia, Perforation of tympanic membrane, Postoperative wound infection, Vitamin D deficiency, and Wears glasses..    Review of Systems   Constitutional: Negative.    HENT: Negative.  Negative for congestion.    Eyes:  Negative.  Negative for blurred vision, double vision, photophobia and visual disturbance.   Respiratory: Negative.  Negative for cough, shortness of breath, wheezing and stridor.    Cardiovascular: Negative.  Negative for chest pain, palpitations and leg swelling.   Gastrointestinal: Negative.  Negative for abdominal distention, abdominal pain, blood in stool, constipation, diarrhea, nausea, vomiting, GERD and indigestion.   Endocrine: Negative.  Negative for cold intolerance and heat intolerance.   Genitourinary: Negative.  Negative for dysuria, flank pain, frequency and urinary incontinence.   Musculoskeletal: Positive for arthralgias. Negative for back pain.   Skin: Negative.    Allergic/Immunologic: Negative.  Negative for immunocompromised state.   Neurological: Negative.    Hematological: Negative.    Psychiatric/Behavioral: Negative.  Negative for agitation, behavioral problems, decreased concentration, dysphoric mood, hallucinations, self-injury, sleep disturbance, suicidal ideas, negative for hyperactivity, depressed mood and stress. The patient is not nervous/anxious.    All other systems reviewed and are negative.    PHQ-9 Depression Screening  Little interest or pleasure in doing things?     Feeling down, depressed, or hopeless?     Trouble falling or staying asleep, or sleeping too much?     Feeling tired or having little energy?     Poor appetite or overeating?     Feeling bad about yourself - or that you are a failure or have let yourself or your family down?     Trouble concentrating on things, such as reading the newspaper or watching television?     Moving or speaking so slowly that other people could have noticed? Or the opposite - being so fidgety or restless that you have been moving around a lot more than usual?     Thoughts that you would be better off dead, or of hurting yourself in some way?     PHQ-9 Total Score     If you checked off any problems, how difficult have these problems made it  "for you to do your work, take care of things at home, or get along with other people?      Patient understands the risks associated with this controlled medication, including tolerance and addiction.  Patient also agrees to only obtain this medication from me, and not from a another provider, unless that provider is covering for me in my absence.  Patient also agrees to be compliant in dosing, and not self adjust the dose of medication.  A signed controlled substance agreement is on file, and the patient has received a controlled substance education sheet at this a previous visit.  The patient has also signed a consent for treatment with a controlled substance as per [facility] policy. LESTER was obtained.    Objective    Vitals:    09/09/21 0901   BP: 100/60   BP Location: Left arm   Patient Position: Sitting   Cuff Size: Adult   Pulse: 86   Resp: 16   Temp: 97.8 °F (36.6 °C)   SpO2: 99%   Weight: Comment: unable to weigh   Height: 172.7 cm (68\")   PainSc:   4   PainLoc: Neck  Comment: shoulder       Physical Exam  Vitals and nursing note reviewed.   Constitutional:       General: She is not in acute distress.     Appearance: Normal appearance. She is well-developed. She is obese. She is not ill-appearing or diaphoretic.   HENT:      Head: Normocephalic and atraumatic.      Right Ear: External ear normal.      Left Ear: External ear normal.      Nose: Nose normal.   Eyes:      General: Lids are normal. Lids are everted, no foreign bodies appreciated. No scleral icterus.        Right eye: No discharge.         Left eye: No discharge.      Conjunctiva/sclera: Conjunctivae normal.      Pupils: Pupils are equal, round, and reactive to light.   Neck:      Thyroid: No thyromegaly.      Vascular: No carotid bruit or JVD.      Trachea: No tracheal deviation.   Cardiovascular:      Rate and Rhythm: Normal rate and regular rhythm.      Pulses: Normal pulses.      Heart sounds: Murmur heard.   Systolic murmur is present " with a grade of 2/6.   No friction rub. No gallop.    Pulmonary:      Effort: Pulmonary effort is normal. No respiratory distress.      Breath sounds: Normal breath sounds. No stridor. No wheezing, rhonchi or rales.   Chest:      Chest wall: No tenderness.   Abdominal:      General: Bowel sounds are normal.      Palpations: Abdomen is soft.   Musculoskeletal:         General: No tenderness or deformity. Normal range of motion.      Cervical back: Normal range of motion and neck supple. No rigidity or tenderness.      Comments: Presents with left residual leg in stump . she is sitting in wheelchair. Surgical site is not viewed due to clothing.      Left Lower Extremity: Left leg is amputated below knee. (5/31/21)  Lymphadenopathy:      Cervical: No cervical adenopathy.   Skin:     General: Skin is warm and dry.      Capillary Refill: Capillary refill takes 2 to 3 seconds.      Coloration: Skin is not jaundiced or pale.      Findings: No bruising, erythema, lesion or rash.   Neurological:      General: No focal deficit present.      Mental Status: She is alert and oriented to person, place, and time. Mental status is at baseline.      Motor: No weakness.      Gait: Gait normal.   Psychiatric:         Mood and Affect: Mood normal.         Behavior: Behavior normal.         Thought Content: Thought content normal.         Judgment: Judgment normal.       Referral to in patient rehab at [facility] sent via fax as requested. Needs new prosthetic LBKA gait training.  New complaint right ear began bleeding last night. Hemotympanum right lower TM border is noted. Ofloxacin drops prescribed.  Stable back pain, phantom pain, anxiety, refill gabapentin, hydrocodone and lorazepam.     Assessment/Plan   Problems Addressed this Visit        Mental Health    Generalized anxiety disorder    Relevant Medications    LORazepam (ATIVAN) 0.5 MG tablet       Musculoskeletal and Injuries    Chronic right-sided low  back pain with right-sided sciatica (Chronic)    Relevant Medications    HYDROcodone-acetaminophen (NORCO) 7.5-325 MG per tablet    gabapentin (NEURONTIN) 400 MG capsule    S/P BKA (below knee amputation) unilateral, left (CMS/HCC)    Relevant Orders    Ambulatory Referral to Physical Medicine Rehab       Neuro    Phantom pain after amputation of lower extremity (CMS/HCC) (Chronic)    Relevant Medications    HYDROcodone-acetaminophen (NORCO) 7.5-325 MG per tablet    Other Relevant Orders    Ambulatory Referral to Physical Medicine Rehab      Other Visit Diagnoses     Encounter for prosthetic gait training    -  Primary    Relevant Orders    Ambulatory Referral to Physical Medicine Rehab    Hemotympanum, right        Relevant Medications    ofloxacin (FLOXIN) 0.3 % otic solution      Diagnoses       Codes Comments    Encounter for prosthetic gait training    -  Primary ICD-10-CM: Z47.89  ICD-9-CM: V57.81     Chronic right-sided low back pain with right-sided sciatica     ICD-10-CM: M54.41, G89.29  ICD-9-CM: 724.2, 724.3, 338.29     Phantom pain after amputation of lower extremity (CMS/HCC)     ICD-10-CM: G54.6  ICD-9-CM: 353.6     Chronic right-sided low back pain with right-sided sciatica     ICD-10-CM: M54.41, G89.29  ICD-9-CM: 724.2, 724.3, 338.29 controlled with Norco    Generalized anxiety disorder     ICD-10-CM: F41.1  ICD-9-CM: 300.02     S/P BKA (below knee amputation) unilateral, left (CMS/HCC)     ICD-10-CM: Z89.512  ICD-9-CM: V49.75     Hemotympanum, right     ICD-10-CM: H74.8X1  ICD-9-CM: 385.89         Return in about 12 weeks (around 12/2/2021).               This document has been electronically signed by BEBE Palomino on September 9, 2021 10:00 CDT

## 2021-09-24 DIAGNOSIS — I10 ESSENTIAL HYPERTENSION: Chronic | ICD-10-CM

## 2021-09-24 RX ORDER — FUROSEMIDE 40 MG/1
TABLET ORAL
Qty: 90 TABLET | Refills: 1 | Status: SHIPPED | OUTPATIENT
Start: 2021-09-24 | End: 2022-01-13

## 2021-09-24 RX ORDER — HYDROCHLOROTHIAZIDE 25 MG/1
TABLET ORAL
Qty: 90 TABLET | Refills: 0 | Status: SHIPPED | OUTPATIENT
Start: 2021-09-24 | End: 2021-12-16

## 2021-09-27 ENCOUNTER — LAB (OUTPATIENT)
Dept: LAB | Facility: HOSPITAL | Age: 61
End: 2021-09-27

## 2021-09-27 ENCOUNTER — OFFICE VISIT (OUTPATIENT)
Dept: ENDOCRINOLOGY | Facility: CLINIC | Age: 61
End: 2021-09-27

## 2021-09-27 VITALS
HEIGHT: 68 IN | DIASTOLIC BLOOD PRESSURE: 62 MMHG | OXYGEN SATURATION: 90 % | SYSTOLIC BLOOD PRESSURE: 102 MMHG | HEART RATE: 74 BPM | WEIGHT: 229 LBS | BODY MASS INDEX: 34.71 KG/M2

## 2021-09-27 DIAGNOSIS — I10 UNCONTROLLED HYPERTENSION: ICD-10-CM

## 2021-09-27 DIAGNOSIS — E11.42 TYPE 2 DIABETES MELLITUS WITH DIABETIC POLYNEUROPATHY, WITH LONG-TERM CURRENT USE OF INSULIN (HCC): ICD-10-CM

## 2021-09-27 DIAGNOSIS — Z79.4 TYPE 2 DIABETES MELLITUS WITH DIABETIC POLYNEUROPATHY, WITH LONG-TERM CURRENT USE OF INSULIN (HCC): ICD-10-CM

## 2021-09-27 DIAGNOSIS — E78.2 MIXED HYPERLIPIDEMIA: ICD-10-CM

## 2021-09-27 DIAGNOSIS — IMO0002 UNCONTROLLED TYPE 2 DIABETES MELLITUS WITH NEUROLOGIC COMPLICATION, WITH LONG-TERM CURRENT USE OF INSULIN: Primary | ICD-10-CM

## 2021-09-27 DIAGNOSIS — E55.9 VITAMIN D DEFICIENCY: ICD-10-CM

## 2021-09-27 LAB
BASOPHILS # BLD AUTO: 0.08 10*3/MM3 (ref 0–0.2)
BASOPHILS NFR BLD AUTO: 0.6 % (ref 0–1.5)
DEPRECATED RDW RBC AUTO: 45.7 FL (ref 37–54)
EOSINOPHIL # BLD AUTO: 0.4 10*3/MM3 (ref 0–0.4)
EOSINOPHIL NFR BLD AUTO: 2.9 % (ref 0.3–6.2)
ERYTHROCYTE [DISTWIDTH] IN BLOOD BY AUTOMATED COUNT: 14.2 % (ref 12.3–15.4)
HCT VFR BLD AUTO: 35.3 % (ref 34–46.6)
HGB BLD-MCNC: 11.7 G/DL (ref 12–15.9)
IMM GRANULOCYTES # BLD AUTO: 0.06 10*3/MM3 (ref 0–0.05)
IMM GRANULOCYTES NFR BLD AUTO: 0.4 % (ref 0–0.5)
LYMPHOCYTES # BLD AUTO: 3.88 10*3/MM3 (ref 0.7–3.1)
LYMPHOCYTES NFR BLD AUTO: 28.5 % (ref 19.6–45.3)
MCH RBC QN AUTO: 29.1 PG (ref 26.6–33)
MCHC RBC AUTO-ENTMCNC: 33.1 G/DL (ref 31.5–35.7)
MCV RBC AUTO: 87.8 FL (ref 79–97)
MONOCYTES # BLD AUTO: 0.85 10*3/MM3 (ref 0.1–0.9)
MONOCYTES NFR BLD AUTO: 6.2 % (ref 5–12)
NEUTROPHILS NFR BLD AUTO: 61.4 % (ref 42.7–76)
NEUTROPHILS NFR BLD AUTO: 8.34 10*3/MM3 (ref 1.7–7)
NRBC BLD AUTO-RTO: 0 /100 WBC (ref 0–0.2)
PLATELET # BLD AUTO: 443 10*3/MM3 (ref 140–450)
PMV BLD AUTO: 10.1 FL (ref 6–12)
RBC # BLD AUTO: 4.02 10*6/MM3 (ref 3.77–5.28)
WBC # BLD AUTO: 13.61 10*3/MM3 (ref 3.4–10.8)

## 2021-09-27 PROCEDURE — 85025 COMPLETE CBC W/AUTO DIFF WBC: CPT | Performed by: NURSE PRACTITIONER

## 2021-09-27 PROCEDURE — 36415 COLL VENOUS BLD VENIPUNCTURE: CPT | Performed by: NURSE PRACTITIONER

## 2021-09-27 PROCEDURE — 99214 OFFICE O/P EST MOD 30 MIN: CPT | Performed by: NURSE PRACTITIONER

## 2021-09-27 RX ORDER — INSULIN GLARGINE 100 [IU]/ML
60 INJECTION, SOLUTION SUBCUTANEOUS 2 TIMES DAILY
Qty: 120 ML | Refills: 11 | Status: SHIPPED | OUTPATIENT
Start: 2021-09-27 | End: 2022-05-05

## 2021-09-27 NOTE — PROGRESS NOTES
"Chief Complaint  Diabetes    Subjective          Ariana Martinez presents to [facility] ENDOCRINOLOGY  History of Present Illness     In office visit     Primary provider BEBE Villatoro     61 year old female presents for follow up     Reason diabetes mellitus type 2     Timing constant     Quality not controlled    Lab Results   Component Value Date    HGBA1C 8.90 (H) 08/11/2021         Severity high  Duration diagnosed at age 26     Left BKA since last appointment    Secondary Macrovascular Complications: CAD, No CVA, No PAD     2 stents placed in feb. 2020         Secondary Microvascular Complications: No Diabetic Nephropathy, No Diabetic Retinopathy, Diabetic Neuropathy       Diabetes Regimen:         Insulin, Oral Medications                Blood Glucose Readings     Off  dexcom sensor --- due to finances      100 up to 200                Review of Systems - General ROS: negative                   Objective   Vital Signs:   /62   Pulse 74   Ht 172.7 cm (68\")   Wt 104 kg (229 lb)   SpO2 90%   BMI 34.82 kg/m²     Physical Exam  Constitutional:       Appearance: Normal appearance.   Cardiovascular:      Rate and Rhythm: Regular rhythm.      Pulses:           Dorsalis pedis pulses are 2+ on the right side.        Posterior tibial pulses are 2+ on the right side.      Heart sounds: Normal heart sounds.   Pulmonary:      Breath sounds: Normal breath sounds.   Musculoskeletal:      Cervical back: Normal range of motion.      Left Lower Extremity: Left leg is amputated below knee.   Feet:      Right foot:      Protective Sensation: 5 sites tested. 2 sites sensed.      Skin integrity: Callus and dry skin present.      Comments: Left BKA    Diabetic Foot Exam Performed and Monofilament Test Performed  Neurological:      Mental Status: She is alert.        Result Review :   The following data was reviewed by: BEBE Prince on 09/27/2021:  Common labs    Common " Labsle 7/19/21 7/19/21 7/29/21 8/11/21 8/11/21 8/11/21 8/11/21    1420 1452  0827 0827 0827 0827   Glucose  73 299 (A)   105 (A)    BUN  18 20   22 (A)    Creatinine  0.88 1.02 (A)   0.89    eGFR Non  Am  66 55 (A)   65    Sodium  141 131 (A)   137    Potassium  3.8 4.6   3.9    Chloride  98 95 (A)   98    Calcium  9.5 9.2   9.5    Albumin  4.20 3.80   4.00    Total Bilirubin  0.3 0.2   0.2    Alkaline Phosphatase  114 120 (A)   107    AST (SGOT)  48 (A) 32   11    ALT (SGPT)  43 (A) 38 (A)   17    WBC 9.37   21.34 (A)      Hemoglobin 14.0   12.6      Hematocrit 41.0   36.9      Platelets 444   347      Total Cholesterol       151   Triglycerides       72   HDL Cholesterol       52   LDL Cholesterol        85   Hemoglobin A1C     8.90 (A)     (A) Abnormal value                      Assessment and Plan    Diagnoses and all orders for this visit:    1. 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, with long-term current use of insulin (CMS/[facility]) (Primary)  -     CBC & Differential    Other orders  -     Insulin Glargine (BASAGLAR KWIKPEN) 100 UNIT/ML injection pen; Inject 60 Units under the skin into the appropriate area as directed 2 (Two) Times a Day.  Dispense: 120 mL; Refill: 11           Glycemic Management     Diabetes mellitus not controlled         Lab Results   Component Value Date    HGBA1C 8.90 (H) 08/11/2021             Dexcom sensor ---off due to finances          Basaglar taking 60 units BID         ==================        Novolog taking up to 14 units for each meal ---increase up to 18 units                     ==================     Jardiance-- stopped         ==================     Metformin--stopped      ====================      bydureon - stopped        Victoza stopped due to side effects            januvia 100 mg daily  -stopped      ------------------------------     Lipid Management        Taking lipitor 80- mg one at night               LDL needs to be 70 or  less     add zetia        Total Cholesterol   Date Value Ref Range Status   08/11/2021 151 0 - 200 mg/dL Final     Triglycerides   Date Value Ref Range Status   08/11/2021 72 0 - 150 mg/dL Final     HDL Cholesterol   Date Value Ref Range Status   08/11/2021 52 40 - 60 mg/dL Final     LDL Cholesterol    Date Value Ref Range Status   08/11/2021 85 0 - 100 mg/dL Final              Blood Pressure Management: Control of blood pressure  on ACEi     stopped the lasix         Microvascular Complication Monitoring:     Neurontin 400 mg po TID -- moderate relief she is taking        Component      Latest Ref Rng & Units 11/12/2020 11/12/2020           8:26 AM  8:26 AM   Protein/Creatinine Ratio      0.0 - 200.0 mg/G Crea 203.8 (H)     Creatinine, Urine      mg/dL 196.3 196.3   Total Protein, Urine      mg/dL 40.0     Microalbumin/Creatinine Ratio      mg/g   39.7   Microalbumin, Urine      mg/dL   7.8         intermittetly takes reglan for gastroparesis     states eye exam was March 2020     Left ZACKERY    Follows with podiatry    Rx for shoes written today and discussed with Dr. Onofre           Immunization - last influenza vaccine Oct. 2020        Other Diabetes Related Aspects     TSH abnormal from July to Sept 2014     TSH in the 5 to 7 range         She still does not want treatment and guidelines state we do not have to treat until the TSH is 7 or higher        Lab Results   Component Value Date    TSH 1.410 08/11/2021       ---     Vitamin D deficiency        Taking vitamin D supplement                Component      Latest Ref Rng & Units 8/11/2021   25 Hydroxy, Vitamin D      ng/ml 32.6                Lab Results   Component Value Date    [id number] 579 08/11/2021               Follow Up   No follow-ups on file.  Patient was given instructions and counseling regarding her condition or for health maintenance advice. Please see specific information pulled into the AVS if appropriate.         This document has been  electronically signed by BEBE Prince on September 27, 2021 08:26 CDT.

## 2021-09-28 ENCOUNTER — TELEPHONE (OUTPATIENT)
Dept: ENDOCRINOLOGY | Facility: CLINIC | Age: 61
End: 2021-09-28

## 2021-09-29 ENCOUNTER — TELEPHONE (OUTPATIENT)
Dept: ENDOCRINOLOGY | Facility: CLINIC | Age: 61
End: 2021-09-29

## 2021-10-05 DIAGNOSIS — E53.8 CYANOCOBALAMIN DEFICIENCY: ICD-10-CM

## 2021-10-05 RX ORDER — CYANOCOBALAMIN 1000 UG/ML
1000 INJECTION, SOLUTION INTRAMUSCULAR; SUBCUTANEOUS
Qty: 3 ML | Refills: 2 | Status: SHIPPED | OUTPATIENT
Start: 2021-10-05 | End: 2022-04-13

## 2021-10-05 NOTE — TELEPHONE ENCOUNTER
Rx Refill Note  Requested Prescriptions     Pending Prescriptions Disp Refills   • cyanocobalamin 1000 MCG/ML injection [Pharmacy Med Name: CYANOCOBALAMIN 1,000 MCG/ML] 3 mL 0     Sig: INJECT 1 ML INTO THE APPROPRIATE MUSCLE AS DIRECTED BY PRESCRIBER EVERY 28 (TWENTY-EIGHT) DAYS.      Last office visit with prescribing clinician: 9/9/2021      Next office visit with prescribing clinician: 12/2/2021            Anusha Wade LPN  10/05/21, 15:39 CDT

## 2021-10-08 DIAGNOSIS — F41.1 GENERALIZED ANXIETY DISORDER: ICD-10-CM

## 2021-10-11 ENCOUNTER — TELEPHONE (OUTPATIENT)
Dept: FAMILY MEDICINE CLINIC | Facility: CLINIC | Age: 61
End: 2021-10-11

## 2021-10-11 DIAGNOSIS — F41.1 GENERALIZED ANXIETY DISORDER: ICD-10-CM

## 2021-10-11 RX ORDER — LORAZEPAM 0.5 MG/1
TABLET ORAL
Qty: 90 TABLET | Refills: 0 | Status: SHIPPED | OUTPATIENT
Start: 2021-10-11 | End: 2021-10-11 | Stop reason: SDUPTHER

## 2021-10-11 NOTE — TELEPHONE ENCOUNTER
Incoming Refill Request      Medication requested (name and dose):   LORazepam (ATIVAN) 0.5 MG tablet      Pharmacy where request should be sent:  [facility]     Additional details provided by patient:     Best call back number:     Does the patient have less than a 3 day supply:  [] Yes  [] No    Tiffanie Vaughan  10/11/21, 12:25 CDT

## 2021-10-11 NOTE — TELEPHONE ENCOUNTER
Patient seen in office every 3 months for chronic anxiety requiring benzodiazepine anxiolytic. Compliant with medication, visits with no adverse effects noted. LESTER and UDS current and appropriate. Patient called requesting scheduled refill at appropriate interval. Patient understands the risks associated with this controlled medication, including tolerance and addiction.  Patient also agrees to only obtain this medication from me, and not from a another provider, unless that provider is covering for me in my absence.  Patient also agrees to be compliant in dosing, and not self adjust the dose of medication.  A signed controlled substance agreement is on file, and the patient has received a controlled substance education sheet at this a previous visit.  The patient has also signed a consent for treatment with a controlled substance as per [facility] policy. LESTER was obtained. Refill sent for lorazepam.     This document has been electronically signed by BEBE Palomino on October 11, 2021 12:22 CDT

## 2021-10-12 RX ORDER — LORAZEPAM 0.5 MG/1
0.5 TABLET ORAL EVERY 8 HOURS
Qty: 90 TABLET | Refills: 0 | Status: SHIPPED | OUTPATIENT
Start: 2021-10-12 | End: 2021-12-02 | Stop reason: SDUPTHER

## 2021-10-14 DIAGNOSIS — D72.829 LEUKOCYTOSIS, UNSPECIFIED TYPE: Primary | ICD-10-CM

## 2021-10-18 ENCOUNTER — TELEPHONE (OUTPATIENT)
Dept: FAMILY MEDICINE CLINIC | Facility: CLINIC | Age: 61
End: 2021-10-18

## 2021-10-18 RX ORDER — BLOOD-GLUCOSE METER
KIT MISCELLANEOUS
Qty: 1 EACH | Refills: 0 | Status: SHIPPED | OUTPATIENT
Start: 2021-10-18

## 2021-10-18 RX ORDER — BLOOD SUGAR DIAGNOSTIC
STRIP MISCELLANEOUS
Qty: 100 EACH | Refills: 3 | Status: SHIPPED | OUTPATIENT
Start: 2021-10-18 | End: 2022-01-24

## 2021-10-18 RX ORDER — BLOOD-GLUCOSE METER
1 KIT MISCELLANEOUS AS NEEDED
Qty: 1 EACH | Refills: 0 | Status: SHIPPED | OUTPATIENT
Start: 2021-10-18 | End: 2021-10-18

## 2021-10-18 RX ORDER — BLOOD-GLUCOSE METER
1 KIT MISCELLANEOUS AS NEEDED
Qty: 1 EACH | Refills: 0 | Status: SHIPPED | OUTPATIENT
Start: 2021-10-18 | End: 2021-10-18 | Stop reason: SDUPTHER

## 2021-10-18 NOTE — TELEPHONE ENCOUNTER
Incoming Refill Request      Medication requested (name and dose):   Accu-Chek Iris meter  Strips  lancets    Pharmacy where request should be sent:   [facility]     Additional details provided by patient:     Best call back number:     Does the patient have less than a 3 day supply:  [] Yes  [] No    Tiffanie Vaughan  10/18/21, 11:04 CDT

## 2021-10-22 DIAGNOSIS — F33.1 DEPRESSION, MAJOR, RECURRENT, MODERATE (HCC): ICD-10-CM

## 2021-10-22 RX ORDER — ARIPIPRAZOLE 15 MG/1
TABLET ORAL
Qty: 90 TABLET | Refills: 1 | Status: SHIPPED | OUTPATIENT
Start: 2021-10-22 | End: 2022-01-24

## 2021-10-25 DIAGNOSIS — G89.29 CHRONIC RIGHT-SIDED LOW BACK PAIN WITH RIGHT-SIDED SCIATICA: ICD-10-CM

## 2021-10-25 DIAGNOSIS — G54.6 PHANTOM PAIN AFTER AMPUTATION OF LOWER EXTREMITY (HCC): Chronic | ICD-10-CM

## 2021-10-25 DIAGNOSIS — M54.41 CHRONIC RIGHT-SIDED LOW BACK PAIN WITH RIGHT-SIDED SCIATICA: ICD-10-CM

## 2021-10-25 NOTE — TELEPHONE ENCOUNTER
Incoming Refill Request      Medication requested (name and dose):   HYDROcodone-acetaminophen (NORCO) 7.5-325 MG per tablet     Pharmacy where request should be sent:   [facility]     Additional details provided by patient:    Best call back number:   Does the patient have less than a 3 day supply:  [] Yes  [] No    Tiffanie Vaughan  10/25/21, 10:50 CDT

## 2021-10-26 ENCOUNTER — TELEPHONE (OUTPATIENT)
Dept: FAMILY MEDICINE CLINIC | Facility: CLINIC | Age: 61
End: 2021-10-26

## 2021-10-26 RX ORDER — ATORVASTATIN CALCIUM 80 MG/1
80 TABLET, FILM COATED ORAL DAILY
COMMUNITY
Start: 2021-09-11 | End: 2021-12-02 | Stop reason: SDUPTHER

## 2021-10-26 RX ORDER — HYDROCODONE BITARTRATE AND ACETAMINOPHEN 7.5; 325 MG/1; MG/1
1 TABLET ORAL EVERY 6 HOURS PRN
Qty: 120 TABLET | Refills: 0 | Status: SHIPPED | OUTPATIENT
Start: 2021-10-26 | End: 2021-11-23 | Stop reason: SDUPTHER

## 2021-10-26 NOTE — TELEPHONE ENCOUNTER
Patient seen in office every 3 months for chronic pain requiring opiate pain medication. Compliant with medication, visits with no adverse effects noted. LESTER and UDS current and appropriate. Patient called requesting scheduled refill at appropriate interval. Patient understands the risks associated with this controlled medication, including tolerance and addiction.  Patient also agrees to only obtain this medication from me, and not from a another provider, unless that provider is covering for me in my absence.  Patient also agrees to be compliant in dosing, and not self adjust the dose of medication.  A signed controlled substance agreement is on file, and the patient has received a controlled substance education sheet at this a previous visit.  The patient has also signed a consent for treatment with a controlled substance as per [facility] policy. LESTER was obtained.   Refill sent for hydrocodone.     This document has been electronically signed by BEBE Palomino on October 26, 2021 16:35 CDT

## 2021-11-01 RX ORDER — METOCLOPRAMIDE 10 MG/1
10 TABLET ORAL 4 TIMES DAILY PRN
Qty: 120 TABLET | Refills: 1 | Status: SHIPPED | OUTPATIENT
Start: 2021-11-01 | End: 2021-12-16

## 2021-11-03 NOTE — TELEPHONE ENCOUNTER
NOHEMY DAIGLE REQUESTS A CALL BACK RE PT [facility] FAX.  NOHEMY STATES THAT FAX RICHAR SENT WASN'T READABLE.  CALL BACK NOHEMY AT [phone].  THANK YOU.   no

## 2021-11-11 NOTE — DISCHARGE INSTRUCTIONS
Acute Care - Speech Language Pathology   Swallow Initial Evaluation [facility]     Patient Name: Rogelio Sher  : 1942  MRN: [id number]  Today's Date: 2021               Admit Date: 11/10/2021    Visit Dx:     ICD-10-CM ICD-9-CM   1. Altered mental status, unspecified altered mental status type  R41.82 780.97   2. Parkinson's disease (HCC)  G20 332.0   3. Hematuria, unspecified type  R31.9 599.70   4. Ambulatory dysfunction  R26.2 719.7     Patient Active Problem List   Diagnosis   • Generalized weakness   • Parkinson's disease (HCC)   • Essential hypertension   • Influenza A   • Altered mental status   • Metabolic encephalopathy     Past Medical History:   Diagnosis Date   • Chorioretinitis     histoplasmosis left eye   • GERD (gastroesophageal reflux disease)    • Hyperlipidemia    • Hypertension    • Kidney stone    • Parkinson's disease (HCC)      Past Surgical History:   Procedure Laterality Date   • CATARACT EXTRACTION, BILATERAL     • COLONOSCOPY     • KIDNEY STONE SURGERY     • TONSILLECTOMY         SLP Recommendation and Plan  SLP Swallowing Diagnosis: suspected pharyngeal dysphagia (21)  SLP Diet Recommendation: NPO, ice chips between meals after oral care, with supervision (21)     SLP Rec. for Method of Medication Administration: meds crushed, with pudding or applesauce (essential meds when pt is responsive to cues) (21)     Monitor for Signs of Aspiration: yes (21)  Recommended Diagnostics: reassess via clinical swallow evaluation (21)  Swallow Criteria for Skilled Therapeutic Interventions Met: demonstrates skilled criteria (21)  Anticipated Discharge Disposition (SLP): unknown (21)  Rehab Potential/Prognosis, Swallowing: adequate, monitor progress closely (21)  Therapy Frequency (Swallow): PRN (21)  Predicted Duration Therapy Intervention (Days): until discharge (21)       Please return with new or worsening symptoms.  Follow-up with podiatry tomorrow for cast. Continue non-weight bearing.                      Outcome Summary: Clinical swallow eval completed. Pt seen initially in am, lethargic. Pt opened eyes to stimuli, however, was unable to maintain alert status. Pt had coughing (productive) after ice chips. Eval terminated due to alert status. Eval continued in pm with pt still needing consistent cues to stay awake. Pt tolerated small ice chips. Pt had wet vocal quality with teaspoon trials of thin and NTL in 1/2 trials. Pt tolerated bites of pureed. Multiple swallows (2-3) noted across trials. Laryngeal elevation sluggish with palpation. Pt is not safe for PO diet due to lethargy at this time. Pt may have essential meds crushed in pureed when he is responsive w/ max cues. Continue with oral care BID. ST to follow up next date for reeval.    Patient was not wearing a face mask during this therapy encounter. Therapist used appropriate personal protective equipment including mask, eye protection and gloves.  Mask used was standard procedure mask. Appropriate PPE was worn during the entire therapy session. Hand hygiene was completed before and after therapy session. Patient is not in enhanced droplet precautions.       SWALLOW EVALUATION (last 72 hours)     SLP Adult Swallow Evaluation     Row Name 11/11/21 1500                   Rehab Evaluation    Document Type evaluation  -AW        Subjective Information no complaints  -AW        Patient Observations cooperative; agree to therapy; lethargic  -AW        Patient/Family/Caregiver Comments/Observations Pt needed max verbal and tactile cues to participate.  -AW        Care Plan Review care plan/treatment goals reviewed; evaluation/treatment results reviewed; risks/benefits reviewed; current/potential barriers reviewed; patient/other agree to care plan  -AW        Care Plan Review, Other Participant(s) other (see comments)  son-in-law present in am  -AW        Patient Effort fair  -AW        Symptoms Noted During/After Treatment fatigue  -AW                   General Information    Patient Profile Reviewed yes  -AW        Pertinent History Of Current Problem Pt admitted with AMS, weakness, and decline over the last several days; h/o Parkinson's Disease.  -AW        Current Method of Nutrition NPO  -AW        Precautions/Limitations, Vision WFL; for purposes of eval  -AW        Precautions/Limitations, Hearing WFL; for purposes of eval  -AW        Prior Level of Function-Communication unknown  -AW        Prior Level of Function-Swallowing no diet consistency restrictions  -AW        Plans/Goals Discussed with patient; family; agreed upon  -AW        Barriers to Rehab cognitive status  -AW        Patient's Goals for Discharge patient did not state  -AW        Family Goals for Discharge patient able to return to PO diet  -AW                  Pain    Additional Documentation Pain Scale: Numbers Pre/Post-Treatment (Group)  -AW                  Pain Scale: Numbers Pre/Post-Treatment    Pretreatment Pain Rating 0/10 - no pain  -AW        Posttreatment Pain Rating 0/10 - no pain  -AW                  Oral Motor Structure and Function    Dentition Assessment natural, present and adequate  -AW        Secretion Management WNL/WFL  -AW        Mucosal Quality moist, healthy  -AW        Volitional Swallow delayed  -AW                  Oral Musculature and Cranial Nerve Assessment    Oral Motor General Assessment generalized oral motor weakness  -AW                  General Eating/Swallowing Observations    Eating/Swallowing Skills fed by SLP  -AW        Positioning During Eating upright in bed  -AW        Utensils Used spoon  -AW        Consistencies Trialed ice chips; thin liquids; nectar/syrup-thick liquids; pureed  -AW                  Clinical Swallow Eval    Pharyngeal Phase suspected pharyngeal impairment  -AW        Clinical Swallow Evaluation Summary Clinical swallow eval completed. Pt seen initially in am, lethargic. Pt opened eyes to stimuli, however, was unable to  maintain alert status. Pt had coughing (productive) after ice chips. Eval terminated due to alert status. Eval continued in pm with pt still needing consistent cues to stay awake. Pt tolerated small ice chips. Pt had wet vocal quality with teaspoon trials of thin and NTL in 1/2 trials. Pt tolerated bites of pureed. Multiple swallows (2-3) noted across trials. Laryngeal elevation sluggish with palpation. Pt is not safe for PO diet due to lethargy at this time. Pt may have essential meds crushed in pureed when he is responsive w/ max cues. Continue with oral care BID. ST to follow up next date for reeval.  -AW                  Clinical Impression    SLP Swallowing Diagnosis suspected pharyngeal dysphagia  -AW        Functional Impact risk of aspiration/pneumonia; risk of malnutrition; risk of dehydration  -AW        Rehab Potential/Prognosis, Swallowing adequate, monitor progress closely  -AW        Swallow Criteria for Skilled Therapeutic Interventions Met demonstrates skilled criteria  -AW                  Recommendations    Therapy Frequency (Swallow) PRN  -AW        Predicted Duration Therapy Intervention (Days) until discharge  -AW        SLP Diet Recommendation NPO; ice chips between meals after oral care, with supervision  -AW        Recommended Diagnostics reassess via clinical swallow evaluation  -AW        Oral Care Recommendations Oral Care BID/PRN  -AW        SLP Rec. for Method of Medication Administration meds crushed; with pudding or applesauce  essential meds when pt is responsive to cues  -AW        Monitor for Signs of Aspiration yes  -AW        Anticipated Discharge Disposition (SLP) unknown  -AW                  Swallow Goals (SLP)    Oral Nutrition/Hydration Goal Selection (SLP) oral nutrition/hydration, SLP goal 1  -AW                  Oral Nutrition/Hydration Goal 1 (SLP)    Oral Nutrition/Hydration Goal 1, SLP Pt will safely tolerate the least restrictive PO diet with no s/s of aspiration.  -AW         Time Frame (Oral Nutrition/Hydration Goal 1, SLP) by discharge  -AW              User Key  (r) = Recorded By, (t) = Taken By, (c) = Cosigned By    Initials Name Effective Dates    Nicolasa Goldberg MS CCC-SLP 06/16/21 -                 EDUCATION  The patient has been educated in the following areas:   Dysphagia (Swallowing Impairment) Oral Care/Hydration NPO rationale.        SLP GOALS     Row Name 11/11/21 1500             Oral Nutrition/Hydration Goal 1 (SLP)    Oral Nutrition/Hydration Goal 1, SLP Pt will safely tolerate the least restrictive PO diet with no s/s of aspiration.  -AW      Time Frame (Oral Nutrition/Hydration Goal 1, SLP) by discharge  -AW            User Key  (r) = Recorded By, (t) = Taken By, (c) = Cosigned By    Initials Name Provider Type    Nicolasa Goldberg MS CCC-SLP Speech and Language Pathologist              SLP Outcome Measures (last 72 hours)     SLP Outcome Measures     Row Name 11/11/21 1500             SLP Outcome Measures    Outcome Measure Used? Adult NOMS  -AW              Adult FCM Scores    FCM Chosen Swallowing  -AW      Swallowing FCM Score 1  -AW            User Key  (r) = Recorded By, (t) = Taken By, (c) = Cosigned By    Initials Name Effective Dates    Nicolasa Goldberg MS CCC-SLP 06/16/21 -                  Time Calculation:    Time Calculation- SLP     Row Name 11/11/21 1548             Time Calculation- SLP    SLP Start Time 1430  -AW      SLP Received On 11/11/21  -AW            User Key  (r) = Recorded By, (t) = Taken By, (c) = Cosigned By    Initials Name Provider Type    Nicolasa Goldberg MS CCC-SLP Speech and Language Pathologist                Therapy Charges for Today     Code Description Service Date Service Provider Modifiers Qty    [id number]  ST EVAL ORAL PHARYNG SWALLOW 4 11/11/2021 Nicolasa Aragon MS CCC-SLP GN 1               Nicolasa Aragon MS CCC-SLP  11/11/2021

## 2021-11-18 ENCOUNTER — OFFICE VISIT (OUTPATIENT)
Dept: ENDOCRINOLOGY | Facility: CLINIC | Age: 61
End: 2021-11-18

## 2021-11-18 VITALS
DIASTOLIC BLOOD PRESSURE: 60 MMHG | OXYGEN SATURATION: 100 % | BODY MASS INDEX: 34.71 KG/M2 | HEIGHT: 68 IN | HEART RATE: 80 BPM | WEIGHT: 229 LBS | SYSTOLIC BLOOD PRESSURE: 130 MMHG

## 2021-11-18 DIAGNOSIS — Z79.4 TYPE 2 DIABETES MELLITUS WITH HYPERGLYCEMIA, WITH LONG-TERM CURRENT USE OF INSULIN (HCC): Primary | ICD-10-CM

## 2021-11-18 DIAGNOSIS — E55.9 VITAMIN D DEFICIENCY: ICD-10-CM

## 2021-11-18 DIAGNOSIS — E11.65 TYPE 2 DIABETES MELLITUS WITH HYPERGLYCEMIA, WITH LONG-TERM CURRENT USE OF INSULIN (HCC): Primary | ICD-10-CM

## 2021-11-18 DIAGNOSIS — I10 PRIMARY HYPERTENSION: ICD-10-CM

## 2021-11-18 DIAGNOSIS — E78.2 MIXED HYPERLIPIDEMIA: ICD-10-CM

## 2021-11-18 PROCEDURE — 99214 OFFICE O/P EST MOD 30 MIN: CPT | Performed by: NURSE PRACTITIONER

## 2021-11-18 NOTE — PROGRESS NOTES
"Chief Complaint  Diabetes    Subjective          Ariana Martinez presents to [facility] ENDOCRINOLOGY  History of Present Illness     In office visit    Primary provider BEBE Villatoro     61-year-old female presents for follow-up    Reason diabetes mellitus type 2    Diagnosed at the age of 26    Timing constant    Quality not controlled    Severity is high      Lab Results   Component Value Date    HGBA1C 8.90 (H) 08/11/2021      Macrovascular Complications: CAD, No CVA, No PAD     2 stents placed in feb. 2020          Microvascular Complications: No Diabetic Nephropathy, No Diabetic Retinopathy, Diabetic Neuropathy    Left BKA         Diabetes Regimen:            Insulin, Oral Medications                 Blood Glucose Readings    Checks 4 times daily          Off  dexcom sensor --- due to finances        States several at goal       Has had lows --in the middle of the night                    Review of Systems - General ROS: negative      Objective   Vital Signs:   /60   Pulse 80   Ht 172.7 cm (68\")   Wt 104 kg (229 lb)   SpO2 100%   BMI 34.82 kg/m²     Physical Exam  Constitutional:       Appearance: Normal appearance.   Cardiovascular:      Rate and Rhythm: Regular rhythm.      Heart sounds: Normal heart sounds.   Pulmonary:      Breath sounds: Normal breath sounds.   Musculoskeletal:      Cervical back: Normal range of motion.   Neurological:      Mental Status: She is alert.        Result Review :   The following data was reviewed by: BEBE Prince on 11/18/2021:  Common labs    Common Labsle 7/29/21 8/11/21 8/11/21 8/11/21 8/11/21 9/27/21     0827 0827 0827 0827    Glucose 299 (A)   105 (A)     BUN 20   22 (A)     Creatinine 1.02 (A)   0.89     eGFR Non  Am 55 (A)   65     Sodium 131 (A)   137     Potassium 4.6   3.9     Chloride 95 (A)   98     Calcium 9.2   9.5     Albumin 3.80   4.00     Total Bilirubin 0.2   0.2     Alkaline Phosphatase 120 " (A)   107     AST (SGOT) 32   11     ALT (SGPT) 38 (A)   17     WBC  21.34 (A)    13.61 (A)   Hemoglobin  12.6    11.7 (A)   Hematocrit  36.9    35.3   Platelets  347    443   Total Cholesterol     151    Triglycerides     72    HDL Cholesterol     52    LDL Cholesterol      85    Hemoglobin A1C   8.90 (A)      (A) Abnormal value                      Assessment and Plan    Diagnoses and all orders for this visit:    1. Type 2 diabetes mellitus with hyperglycemia, with long-term current use of insulin (HCC) (Primary)  -     CBC & Differential  -     Comprehensive Metabolic Panel  -     Hemoglobin A1c  -     Lipid Panel  -     Microalbumin / Creatinine Urine Ratio - Urine, Clean Catch  -     Protein / Creatinine Ratio, Urine - Urine, Clean Catch  -     TSH  -     Vitamin B12  -     Vitamin D 25 Hydroxy    2. Mixed hyperlipidemia  -     CBC & Differential  -     Comprehensive Metabolic Panel  -     Hemoglobin A1c  -     Lipid Panel  -     Microalbumin / Creatinine Urine Ratio - Urine, Clean Catch  -     Protein / Creatinine Ratio, Urine - Urine, Clean Catch  -     TSH  -     Vitamin B12  -     Vitamin D 25 Hydroxy    3. Vitamin D deficiency  -     CBC & Differential  -     Comprehensive Metabolic Panel  -     Hemoglobin A1c  -     Lipid Panel  -     Microalbumin / Creatinine Urine Ratio - Urine, Clean Catch  -     Protein / Creatinine Ratio, Urine - Urine, Clean Catch  -     TSH  -     Vitamin B12  -     Vitamin D 25 Hydroxy    4. Primary hypertension  -     CBC & Differential  -     Comprehensive Metabolic Panel  -     Hemoglobin A1c  -     Lipid Panel  -     Microalbumin / Creatinine Urine Ratio - Urine, Clean Catch  -     Protein / Creatinine Ratio, Urine - Urine, Clean Catch  -     TSH  -     Vitamin B12  -     Vitamin D 25 Hydroxy             Glycemic Management     Diabetes mellitus not controlled         Lab Results   Component Value Date    HGBA1C 8.90 (H) 08/11/2021             Dexcom sensor ---off due to  finances          Dipti taking 40  units BID ---decrease to 36 units bid                    Novolog taking up to 14 units for each meal -                       ==================     Jardiance-- stopped       Metformin--stopped       bydureon - stopped     Victoza stopped due to side effects         januvia 100 mg daily  -stopped             Lipid Management        Taking lipitor 80- mg one at night               LDL needs to be 70 or less                   Total Cholesterol   Date Value Ref Range Status   08/11/2021 151 0 - 200 mg/dL Final            Triglycerides   Date Value Ref Range Status   08/11/2021 72 0 - 150 mg/dL Final            HDL Cholesterol   Date Value Ref Range Status   08/11/2021 52 40 - 60 mg/dL Final            LDL Cholesterol    Date Value Ref Range Status   08/11/2021 85 0 - 100 mg/dL Final               Blood Pressure Management: Control of blood pressure  on ACEi     stopped the lasix         Microvascular Complication Monitoring:     Neurontin 400 mg po TID -- moderate relief she is taking        Component      Latest Ref Rng & Units 11/12/2020 11/12/2020           8:26 AM  8:26 AM   Protein/Creatinine Ratio      0.0 - 200.0 mg/G Crea 203.8 (H)     Creatinine, Urine      mg/dL 196.3 196.3   Total Protein, Urine      mg/dL 40.0     Microalbumin/Creatinine Ratio      mg/g   39.7   Microalbumin, Urine      mg/dL   7.8         intermittetly takes reglan for gastroparesis     states eye exam was March 2021, no DR      Left BKA     Follows with podiatry                 Other Diabetes Related Aspects     TSH abnormal from July to Sept 2014     TSH in the 5 to 7 range         She still does not want treatment and guidelines state we do not have to treat until the TSH is 7 or higher        Lab Results   Component Value Date    TSH 1.410 08/11/2021    [id number] 9.70 08/30/2017    THYROIDAB <6 01/02/2015        Vitamin D deficiency        Taking vitamin D supplement          Component      Latest Ref  Rng & Units 8/11/2021   25 Hydroxy, Vitamin D      ng/ml 32.6           Lab Results   Component Value Date    [id number] 08/11/2021             Follow Up   No follow-ups on file.  Patient was given instructions and counseling regarding her condition or for health maintenance advice. Please see specific information pulled into the AVS if appropriate.         This document has been electronically signed by BEBE Prince on November 18, 2021 13:53 CST.

## 2021-11-19 RX ORDER — VENLAFAXINE HYDROCHLORIDE 150 MG/1
CAPSULE, EXTENDED RELEASE ORAL
Qty: 90 CAPSULE | Refills: 1 | Status: SHIPPED | OUTPATIENT
Start: 2021-11-19 | End: 2022-03-31 | Stop reason: SDUPTHER

## 2021-11-23 ENCOUNTER — TELEPHONE (OUTPATIENT)
Dept: FAMILY MEDICINE CLINIC | Facility: CLINIC | Age: 61
End: 2021-11-23

## 2021-11-23 DIAGNOSIS — G54.6 PHANTOM PAIN AFTER AMPUTATION OF LOWER EXTREMITY (HCC): Chronic | ICD-10-CM

## 2021-11-23 DIAGNOSIS — M54.41 CHRONIC RIGHT-SIDED LOW BACK PAIN WITH RIGHT-SIDED SCIATICA: ICD-10-CM

## 2021-11-23 DIAGNOSIS — G89.29 CHRONIC RIGHT-SIDED LOW BACK PAIN WITH RIGHT-SIDED SCIATICA: ICD-10-CM

## 2021-11-23 RX ORDER — HYDROCODONE BITARTRATE AND ACETAMINOPHEN 7.5; 325 MG/1; MG/1
1 TABLET ORAL EVERY 6 HOURS PRN
Qty: 120 TABLET | Refills: 0 | Status: SHIPPED | OUTPATIENT
Start: 2021-11-23 | End: 2021-12-16 | Stop reason: SDUPTHER

## 2021-11-23 NOTE — TELEPHONE ENCOUNTER
Patient seen in office every 3 months for chronic pain requiring opiate pain medication. Compliant with medication, visits with no adverse effects noted. LESTER and UDS current and appropriate. Patient called requesting scheduled refill at appropriate interval. Patient understands the risks associated with this controlled medication, including tolerance and addiction.  Patient also agrees to only obtain this medication from me, and not from a another provider, unless that provider is covering for me in my absence.  Patient also agrees to be compliant in dosing, and not self adjust the dose of medication.  A signed controlled substance agreement is on file, and the patient has received a controlled substance education sheet at this a previous visit.  The patient has also signed a consent for treatment with a controlled substance as per [facility] policy. LESTER was obtained.   Refill sent for hydrocodone.     This document has been electronically signed by BEBE Palomino on November 23, 2021 15:44 CST

## 2021-11-23 NOTE — TELEPHONE ENCOUNTER
Incoming Refill Request      Medication requested (name and dose):   HYDROcodone-acetaminophen (NORCO) 7.5-325 MG per tablet    Pharmacy where request should be sent:   [facility] [place]      Additional details provided by patient:     Best call back number:     Does the patient have less than a 3 day supply:  [] Yes  [] No    Chandana Bertrand Rep  11/23/21, 08:07 CST

## 2021-11-28 DIAGNOSIS — I10 ESSENTIAL HYPERTENSION: ICD-10-CM

## 2021-11-29 RX ORDER — LISINOPRIL 20 MG/1
TABLET ORAL
Qty: 90 TABLET | Refills: 1 | Status: SHIPPED | OUTPATIENT
Start: 2021-11-29 | End: 2022-02-25

## 2021-11-30 ENCOUNTER — OFFICE VISIT (OUTPATIENT)
Dept: ONCOLOGY | Facility: CLINIC | Age: 61
End: 2021-11-30

## 2021-11-30 ENCOUNTER — LAB (OUTPATIENT)
Dept: ONCOLOGY | Facility: HOSPITAL | Age: 61
End: 2021-11-30

## 2021-11-30 VITALS
HEART RATE: 94 BPM | TEMPERATURE: 96.3 F | WEIGHT: 240 LBS | SYSTOLIC BLOOD PRESSURE: 127 MMHG | BODY MASS INDEX: 36.49 KG/M2 | OXYGEN SATURATION: 98 % | RESPIRATION RATE: 18 BRPM | DIASTOLIC BLOOD PRESSURE: 64 MMHG

## 2021-11-30 DIAGNOSIS — D72.828 OTHER ELEVATED WHITE BLOOD CELL (WBC) COUNT: Primary | ICD-10-CM

## 2021-11-30 DIAGNOSIS — D75.839 THROMBOCYTOSIS: ICD-10-CM

## 2021-11-30 LAB
BASOPHILS # BLD AUTO: 0.07 10*3/MM3 (ref 0–0.2)
BASOPHILS NFR BLD AUTO: 0.6 % (ref 0–1.5)
DEPRECATED RDW RBC AUTO: 41.7 FL (ref 37–54)
EOSINOPHIL # BLD AUTO: 0.4 10*3/MM3 (ref 0–0.4)
EOSINOPHIL NFR BLD AUTO: 3.2 % (ref 0.3–6.2)
ERYTHROCYTE [DISTWIDTH] IN BLOOD BY AUTOMATED COUNT: 12.9 % (ref 12.3–15.4)
FERRITIN SERPL-MCNC: 212 NG/ML (ref 13–150)
HCT VFR BLD AUTO: 38.7 % (ref 34–46.6)
HGB BLD-MCNC: 13 G/DL (ref 12–15.9)
IMM GRANULOCYTES # BLD AUTO: 0.06 10*3/MM3 (ref 0–0.05)
IMM GRANULOCYTES NFR BLD AUTO: 0.5 % (ref 0–0.5)
IRON 24H UR-MRATE: 75 MCG/DL (ref 37–145)
IRON SATN MFR SERPL: 22 % (ref 20–50)
LYMPHOCYTES # BLD AUTO: 3.48 10*3/MM3 (ref 0.7–3.1)
LYMPHOCYTES NFR BLD AUTO: 27.4 % (ref 19.6–45.3)
MCH RBC QN AUTO: 29.6 PG (ref 26.6–33)
MCHC RBC AUTO-ENTMCNC: 33.6 G/DL (ref 31.5–35.7)
MCV RBC AUTO: 88.2 FL (ref 79–97)
MONOCYTES # BLD AUTO: 0.67 10*3/MM3 (ref 0.1–0.9)
MONOCYTES NFR BLD AUTO: 5.3 % (ref 5–12)
NEUTROPHILS NFR BLD AUTO: 63 % (ref 42.7–76)
NEUTROPHILS NFR BLD AUTO: 8.01 10*3/MM3 (ref 1.7–7)
NRBC BLD AUTO-RTO: 0 /100 WBC (ref 0–0.2)
PLATELET # BLD AUTO: 447 10*3/MM3 (ref 140–450)
PMV BLD AUTO: 9.4 FL (ref 6–12)
RBC # BLD AUTO: 4.39 10*6/MM3 (ref 3.77–5.28)
TIBC SERPL-MCNC: 340 MCG/DL (ref 298–536)
TRANSFERRIN SERPL-MCNC: 228 MG/DL (ref 200–360)
WBC NRBC COR # BLD: 12.69 10*3/MM3 (ref 3.4–10.8)

## 2021-11-30 PROCEDURE — G0463 HOSPITAL OUTPT CLINIC VISIT: HCPCS | Performed by: NURSE PRACTITIONER

## 2021-11-30 PROCEDURE — 82607 VITAMIN B-12: CPT

## 2021-11-30 PROCEDURE — 84466 ASSAY OF TRANSFERRIN: CPT

## 2021-11-30 PROCEDURE — 82728 ASSAY OF FERRITIN: CPT

## 2021-11-30 PROCEDURE — 82746 ASSAY OF FOLIC ACID SERUM: CPT

## 2021-11-30 PROCEDURE — 83540 ASSAY OF IRON: CPT

## 2021-11-30 PROCEDURE — 99212 OFFICE O/P EST SF 10 MIN: CPT | Performed by: NURSE PRACTITIONER

## 2021-11-30 PROCEDURE — 85025 COMPLETE CBC W/AUTO DIFF WBC: CPT

## 2021-11-30 RX ORDER — FOLIC ACID 1 MG/1
1 TABLET ORAL DAILY
Qty: 90 TABLET | Refills: 1 | Status: SHIPPED | OUTPATIENT
Start: 2021-11-30 | End: 2022-03-24

## 2021-12-01 LAB
FOLATE SERPL-MCNC: >20 NG/ML (ref 4.78–24.2)
VIT B12 BLD-MCNC: >2000 PG/ML (ref 211–946)

## 2021-12-02 ENCOUNTER — OFFICE VISIT (OUTPATIENT)
Dept: FAMILY MEDICINE CLINIC | Facility: CLINIC | Age: 61
End: 2021-12-02

## 2021-12-02 ENCOUNTER — LAB (OUTPATIENT)
Dept: LAB | Facility: HOSPITAL | Age: 61
End: 2021-12-02

## 2021-12-02 VITALS
WEIGHT: 229 LBS | TEMPERATURE: 97.6 F | RESPIRATION RATE: 16 BRPM | HEIGHT: 68 IN | DIASTOLIC BLOOD PRESSURE: 70 MMHG | OXYGEN SATURATION: 98 % | SYSTOLIC BLOOD PRESSURE: 124 MMHG | BODY MASS INDEX: 34.71 KG/M2 | HEART RATE: 74 BPM

## 2021-12-02 DIAGNOSIS — F41.1 GENERALIZED ANXIETY DISORDER: ICD-10-CM

## 2021-12-02 DIAGNOSIS — M54.41 CHRONIC RIGHT-SIDED LOW BACK PAIN WITH RIGHT-SIDED SCIATICA: Chronic | ICD-10-CM

## 2021-12-02 DIAGNOSIS — Z89.512 S/P BKA (BELOW KNEE AMPUTATION) UNILATERAL, LEFT (HCC): ICD-10-CM

## 2021-12-02 DIAGNOSIS — R26.89 FUNCTIONAL GAIT ABNORMALITY: ICD-10-CM

## 2021-12-02 DIAGNOSIS — Z91.81 AT RISK FOR FALLS: ICD-10-CM

## 2021-12-02 DIAGNOSIS — Z79.891 LONG TERM PRESCRIPTION OPIATE USE: Primary | ICD-10-CM

## 2021-12-02 DIAGNOSIS — M54.41 CHRONIC RIGHT-SIDED LOW BACK PAIN WITH RIGHT-SIDED SCIATICA: ICD-10-CM

## 2021-12-02 DIAGNOSIS — Z79.891 LONG TERM PRESCRIPTION OPIATE USE: ICD-10-CM

## 2021-12-02 DIAGNOSIS — R26.89 BALANCE DISORDER: ICD-10-CM

## 2021-12-02 DIAGNOSIS — G54.6 PHANTOM PAIN AFTER AMPUTATION OF LOWER EXTREMITY (HCC): Chronic | ICD-10-CM

## 2021-12-02 DIAGNOSIS — G89.29 CHRONIC RIGHT-SIDED LOW BACK PAIN WITH RIGHT-SIDED SCIATICA: ICD-10-CM

## 2021-12-02 DIAGNOSIS — Z12.31 ENCOUNTER FOR SCREENING MAMMOGRAM FOR MALIGNANT NEOPLASM OF BREAST: ICD-10-CM

## 2021-12-02 DIAGNOSIS — F32.0 CURRENT MILD EPISODE OF MAJOR DEPRESSIVE DISORDER WITHOUT PRIOR EPISODE (HCC): ICD-10-CM

## 2021-12-02 DIAGNOSIS — G89.29 CHRONIC RIGHT-SIDED LOW BACK PAIN WITH RIGHT-SIDED SCIATICA: Chronic | ICD-10-CM

## 2021-12-02 DIAGNOSIS — Z51.81 ENCOUNTER FOR THERAPEUTIC DRUG LEVEL MONITORING: ICD-10-CM

## 2021-12-02 PROCEDURE — 99214 OFFICE O/P EST MOD 30 MIN: CPT | Performed by: NURSE PRACTITIONER

## 2021-12-02 PROCEDURE — G0480 DRUG TEST DEF 1-7 CLASSES: HCPCS | Performed by: NURSE PRACTITIONER

## 2021-12-02 PROCEDURE — 80307 DRUG TEST PRSMV CHEM ANLYZR: CPT | Performed by: NURSE PRACTITIONER

## 2021-12-02 PROCEDURE — G0481 DRUG TEST DEF 8-14 CLASSES: HCPCS | Performed by: NURSE PRACTITIONER

## 2021-12-02 RX ORDER — CLOPIDOGREL BISULFATE 75 MG/1
TABLET ORAL
Qty: 90 TABLET | Refills: 1 | Status: SHIPPED | OUTPATIENT
Start: 2021-12-02 | End: 2022-02-28 | Stop reason: SDUPTHER

## 2021-12-02 RX ORDER — GABAPENTIN 400 MG/1
400 CAPSULE ORAL 3 TIMES DAILY
Qty: 90 CAPSULE | Refills: 2 | Status: SHIPPED | OUTPATIENT
Start: 2021-12-02 | End: 2022-02-28 | Stop reason: SDUPTHER

## 2021-12-02 RX ORDER — LORAZEPAM 0.5 MG/1
0.5 TABLET ORAL EVERY 8 HOURS
Qty: 90 TABLET | Refills: 0 | Status: SHIPPED | OUTPATIENT
Start: 2021-12-02 | End: 2022-02-03

## 2021-12-02 RX ORDER — HYDROCODONE BITARTRATE AND ACETAMINOPHEN 7.5; 325 MG/1; MG/1
1 TABLET ORAL EVERY 6 HOURS PRN
Qty: 120 TABLET | Refills: 0 | Status: CANCELLED | OUTPATIENT
Start: 2021-12-02

## 2021-12-02 NOTE — PROGRESS NOTES
Subjective   Ariana Martinez is a 61 y.o. female.       Chief Complaint   Patient presents with   • Back Pain     12 week f/u   • paper work        History of Present Illness   Patient presents for routine follow-up of chronic pain of the left residual limb, needs a prescription for a multipronged cane, needs orders for her mammogram and requests referral to a behavioral health therapist/counselor today.    Has completed inpatient therapy and training for use of left lower limb orthotics. Continues to have moderate phantom pain of the left residual leg managed with hydrocodone. Due for refill today. Reports adequate relief with current medication. She is beginning outpatient therapy now for continued gait training and occupational therapy issues. Is just now realizing the emotional impact of losing the leg and recognizes the need for someone to talk this over with. Requests referral to behavioral health counseling for this purpose. Denies self-harm ideations or depressive mood more than what she deems normal or expected. Continues to have some gait abnormality as expected with beginning use of a new lower limb orthotic and her physical therapist has recommended the use of a 3 or 4 pronged cane for stability. Orders placed at this time.    Other complaints today: None.        Past Surgical History:   Procedure Laterality Date   • ABDOMINAL SURGERY     • AMPUTATION FOOT     • ANGIOPLASTY      coronary   • ANKLE ARTHROSCOPY Left 2/26/2021    Procedure: Left foot hardware removal, ankle arthroscopy, bone grafting , left foot exostectomy;  Surgeon: Ignacio Lord DPM;  Location: [facility] OR;  Service: Podiatry;  Laterality: Left;   • BREAST BIOPSY Right    • CARDIAC CATHETERIZATION     • CARDIAC CATHETERIZATION N/A 6/20/2017    Procedure: Right Heart Cath;  Surgeon: Can Kwon MD PhD;  Location: [facility] CATH INVASIVE LOCATION;  Service:    • CARDIAC CATHETERIZATION N/A 2/18/2020    Procedure: Left Heart  Cath;  Surgeon: Catalina Cooper MD;  Location: [facility] CATH INVASIVE LOCATION;  Service: Cardiology;  Laterality: N/A;   • CARPAL TUNNEL RELEASE     • CHOLECYSTECTOMY     • COLONOSCOPY N/A 6/24/2020    Procedure: COLONOSCOPY;  Surgeon: Julián Maldonado MD;  Location: [facility] ENDOSCOPY;  Service: Gastroenterology;  Laterality: N/A;   • CORONARY ARTERY BYPASS GRAFT  2005   • ENDOSCOPY N/A 10/19/2018    Procedure: ESOPHAGOGASTRODUODENOSCOPY possible dilation;  Surgeon: Julián Maldonado MD;  Location: [facility] ENDOSCOPY;  Service: Gastroenterology   • ENDOSCOPY N/A 6/24/2020    Procedure: ESOPHAGOGASTRODUODENOSCOPY WED appt please;  Surgeon: Julián Maldonado MD;  Location: [facility] ENDOSCOPY;  Service: Gastroenterology;  Laterality: N/A;   • ENDOSCOPY AND COLONOSCOPY     • FOOT SURGERY      Toes   • FOOT SURGERY     • GASTRIC BANDING      Revision, laparoscopic   • HYSTERECTOMY     • INCISION AND DRAINAGE LEG Left 3/12/2021    Procedure: Left ankle arthroscopic irrigation and debridement, screw removal;  Surgeon: Ignacio Lord DPM;  Location: [facility] OR;  Service: Podiatry;  Laterality: Left;   • MOUTH SURGERY     • SALPINGO OOPHORECTOMY     • SHOULDER SURGERY     • SUBTALAR ARTHRODESIS Left 1/16/2019    Procedure: LEFT FOOT HARDWARE REMOVAL, FIFTH METATARSAL , OPEN REDUCTION INTERNAL FIXATION, CALCANEAL OSTEOTOMY;  Surgeon: Ignacio Lord DPM;  Location: [facility] OR;  Service: Podiatry   • SUBTALAR ARTHRODESIS Left 10/16/2019    Procedure: foot hardware removal, subtalar joint fusion  possible de/reattachment of achilles tendon        (c-arm);  Surgeon: Ignacio Lord DPM;  Location: [facility] OR;  Service: Podiatry   • SUBTALAR ARTHRODESIS Left 9/30/2020    Procedure: subtalar, talonavicular joint arthrodesis.  Removal hardware.          (c-arm);  Surgeon: Ignacio Lord DPM;  Location: [facility] OR;  Service: Podiatry;  Laterality: Left;   • TRANSESOPHAGEAL ECHOCARDIOGRAM (LAMONTE)      With color flow       Social History     Socioeconomic History   • Marital status:    Tobacco Use   • Smoking status: Never Smoker   • Smokeless tobacco: Never Used   Vaping Use   • Vaping Use: Never used   Substance and Sexual Activity   • Alcohol use: No   • Drug use: No   • Sexual activity: Defer      The following portions of the patient's history were reviewed and updated as appropriate: allergies, current medications, past family history, past medical history, past social history, past surgical history and problem list.    Review of Systems   Constitutional: Negative.    HENT: Negative.  Negative for congestion.    Eyes: Negative.  Negative for blurred vision, double vision, photophobia and visual disturbance.   Respiratory: Negative.  Negative for cough, shortness of breath, wheezing and stridor.    Cardiovascular: Negative.  Negative for chest pain, palpitations and leg swelling.   Gastrointestinal: Negative.  Negative for abdominal distention, abdominal pain, blood in stool, constipation, diarrhea, nausea, vomiting, GERD and indigestion.   Endocrine: Negative.  Negative for cold intolerance and heat intolerance.   Genitourinary: Negative.  Negative for dysuria, flank pain, frequency and urinary incontinence.   Musculoskeletal: Positive for arthralgias and back pain.   Skin: Negative.    Allergic/Immunologic: Negative.  Negative for immunocompromised state.   Neurological: Positive for numbness.   Hematological: Negative.    Psychiatric/Behavioral: Negative for agitation, behavioral problems, decreased concentration, dysphoric mood, hallucinations, self-injury, sleep disturbance, suicidal ideas, negative for hyperactivity, depressed mood and stress. The patient is nervous/anxious.    All other systems reviewed and are negative.    PHQ-9 Depression Screening  Little interest or pleasure in doing things?     Feeling down, depressed, or hopeless?     Trouble falling or staying asleep, or sleeping too much?     Feeling tired  "or having little energy?     Poor appetite or overeating?     Feeling bad about yourself - or that you are a failure or have let yourself or your family down?     Trouble concentrating on things, such as reading the newspaper or watching television?     Moving or speaking so slowly that other people could have noticed? Or the opposite - being so fidgety or restless that you have been moving around a lot more than usual?     Thoughts that you would be better off dead, or of hurting yourself in some way?     PHQ-9 Total Score     If you checked off any problems, how difficult have these problems made it for you to do your work, take care of things at home, or get along with other people?      Patient understands the risks associated with this controlled medication, including tolerance and addiction.  Patient also agrees to only obtain this medication from me, and not from a another provider, unless that provider is covering for me in my absence.  Patient also agrees to be compliant in dosing, and not self adjust the dose of medication.  A signed controlled substance agreement is on file, and the patient has received a controlled substance education sheet at this a previous visit.  The patient has also signed a consent for treatment with a controlled substance as per [facility] policy. LESTER was obtained.    Objective    Vitals:    12/02/21 0812   BP: 124/70   BP Location: Left arm   Patient Position: Sitting   Cuff Size: Adult   Pulse: 74   Resp: 16   Temp: 97.6 °F (36.4 °C)   SpO2: 98%   Weight: 104 kg (229 lb)   Height: 172.7 cm (68\")   PainSc:   5   PainLoc: Generalized     Body mass index is 34.82 kg/m².    Physical Exam  Vitals and nursing note reviewed.   Constitutional:       General: She is not in acute distress.     Appearance: Normal appearance. She is well-developed. She is obese. She is not diaphoretic.   HENT:      Head: Normocephalic and atraumatic.   Eyes:      General: No scleral icterus.        " Right eye: No discharge.         Left eye: No discharge.      Conjunctiva/sclera: Conjunctivae normal.      Pupils: Pupils are equal, round, and reactive to light.   Neck:      Thyroid: No thyromegaly.      Vascular: No carotid bruit or JVD.      Trachea: No tracheal deviation.   Cardiovascular:      Rate and Rhythm: Normal rate and regular rhythm.      Pulses: Normal pulses.      Heart sounds: Normal heart sounds. No murmur heard.  No friction rub. No gallop.    Pulmonary:      Effort: Pulmonary effort is normal. No respiratory distress.      Breath sounds: Normal breath sounds. No stridor. No wheezing, rhonchi or rales.   Chest:      Chest wall: No tenderness.   Abdominal:      General: Bowel sounds are normal.      Palpations: Abdomen is soft.   Musculoskeletal:         General: No tenderness or deformity. Normal range of motion.      Cervical back: Normal range of motion and neck supple. No rigidity or tenderness.        Back:       Right lower leg: No edema.      Comments: Left BKA. Above shows location of chronic lower back pain and right-sided sciatica.      Left Lower Extremity: Left leg is amputated below knee.   Lymphadenopathy:      Cervical: No cervical adenopathy.   Skin:     General: Skin is warm and dry.      Capillary Refill: Capillary refill takes 2 to 3 seconds.      Coloration: Skin is not jaundiced or pale.      Findings: No bruising, erythema, lesion or rash.   Neurological:      General: No focal deficit present.      Mental Status: She is alert and oriented to person, place, and time. Mental status is at baseline.      Motor: No weakness.      Gait: Gait normal.   Psychiatric:         Mood and Affect: Mood normal.         Behavior: Behavior normal.         Thought Content: Thought content normal.         Judgment: Judgment normal.      Comments: Demonstrates intelligent and very thoughtful concern for her emotional status as she grieves the loss of her left leg.       T2DM, vitamin D  deficiency managed by endocrinology, labs 11/18/21 reviewed.  Anemia, iron deficiency, Vitamin b12 deficiency, managed by hematology, labs 11/30/21, reviewed.  Due for uds, urine gabapentin, mammogram.  Balance problems due to new left below knee prosthesis use, gait abn, at risk for falls, order for 3-4 pronged cane for stability.  Stable chronic anxiety, refill lorazepam.  Stable chronic lower right back pain with right sided sciatica managed with hydrocodone, not due for refill today, and gabapentin, which is due for refill today.   Stable left BKA residual leg phantom pain managed with gabapentin, refill tdoay.     Assessment/Plan   Diagnoses and all orders for this visit:    1. Long term prescription opiate use (Primary)  -     Gabapentin level; Future    2. Chronic right-sided low back pain with right-sided sciatica  -     gabapentin (NEURONTIN) 400 MG capsule; Take 1 capsule by mouth 3 (Three) Times a Day.  Dispense: 90 capsule; Refill: 2    3. Phantom pain after amputation of lower extremity (HCC)  -     gabapentin (NEURONTIN) 400 MG capsule; Take 1 capsule by mouth 3 (Three) Times a Day.  Dispense: 90 capsule; Refill: 2    4. Chronic right-sided low back pain with right-sided sciatica  Comments:  controlled with Norco  Orders:  -     gabapentin (NEURONTIN) 400 MG capsule; Take 1 capsule by mouth 3 (Three) Times a Day.  Dispense: 90 capsule; Refill: 2    5. Generalized anxiety disorder  -     LORazepam (ATIVAN) 0.5 MG tablet; Take 1 tablet by mouth Every 8 (Eight) Hours.  Dispense: 90 tablet; Refill: 0  -     Ambulatory Referral to Behavioral Health    6. Encounter for screening mammogram for malignant neoplasm of breast  -     Cancel: Mammo Screening Bilateral With CAD; Future  -     Mammo screening digital tomosynthesis bilateral w CAD; Future    7. S/P BKA (below knee amputation) unilateral, left (HCC)  -     Cane    8. Functional gait abnormality  -     Cane    9. Balance disorder  -     Cane    10. At  risk for falls  -     Cane    11. Encounter for therapeutic drug level monitoring  -     ToxASSURE Select 13 Discrete -  -     Gabapentin, Urine -    12. Current mild episode of major depressive disorder without prior episode (HCC)  -     Ambulatory Referral to Behavioral Health      Return in about 12 weeks (around 2/24/2022).               This document has been electronically signed by BEBE Palomino on December 26, 2021 15:28 CST

## 2021-12-03 NOTE — PROGRESS NOTES
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  Due to iron deficiency and inability to tolerate iron by mouth, patient received 2 dose of intravenous Feraheme on October 23, 2019 and October 30, 2019.    Recently seen in May and iron stores were dropping again and patient was given 2 additional doses of IV iron on 6/4/2020 and 6/12/2020. She also had upper and lower endoscopies 6/24/2020 with no identifiable source of bleeding.  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    Past Medical History, Past Surgical History, Social History, Family History have been reviewed and are without significant changes except as mentioned.    Review of Systems   A comprehensive 14 point review of systems was performed and was negative except as mentioned.    Medications:  The current medication list was reviewed in the EMR    ALLERGIES:    Allergies   Allergen Reactions   • Seroquel [Quetiapine] Anaphylaxis   • Avandia [Rosiglitazone] Swelling   • Morphine And Related Hallucinations   • Oxycodone Hallucinations       Objective      Vitals:    11/30/21 1352   BP: 127/64   Pulse: 94   Resp: 18   Temp: 96.3 °F (35.7 °C)   SpO2: 98%   Weight: 109 kg (240 lb)   PainSc: 0-No pain     Current Status 4/22/2021   ECOG score 4       Physical Exam  General: alert and oriented no acute distress  Lungs: CTAB  Card: RRR no murmur  GI; soft non tender non distended  Ext; prosthesis LLE    RECENT LABS:  Glucose   Date  Value Ref Range Status   08/11/2021 105 (H) 65 - 99 mg/dL Final     Glucose, Arterial   Date Value Ref Range Status   10/14/2017 155 mmol/L Final     Sodium   Date Value Ref Range Status   08/11/2021 137 136 - 145 mmol/L Final     Potassium   Date Value Ref Range Status   08/11/2021 3.9 3.5 - 5.2 mmol/L Final   03/18/2021 3.6 3.5 - 5.4 MMOL/L Final     CO2   Date Value Ref Range Status   08/11/2021 26.4 22.0 - 29.0 mmol/L Final     Chloride   Date Value Ref Range Status   08/11/2021 98 98 - 107 mmol/L Final     Anion Gap   Date Value Ref Range Status   08/11/2021 12.6 5.0 - 15.0 mmol/L Final     Creatinine   Date Value Ref Range Status   08/11/2021 0.89 0.57 - 1.00 mg/dL Final     BUN   Date Value Ref Range Status   08/11/2021 22 (H) 6 - 20 mg/dL Final     BUN/Creatinine Ratio   Date Value Ref Range Status   08/11/2021 24.7 7.0 - 25.0 Final     Calcium   Date Value Ref Range Status   08/11/2021 9.5 8.6 - 10.5 mg/dL Final     eGFR Non  Amer   Date Value Ref Range Status   08/11/2021 65 >60 mL/min/1.73 Final     Alkaline Phosphatase   Date Value Ref Range Status   08/11/2021 107 39 - 117 U/L Final     Total Protein   Date Value Ref Range Status   08/11/2021 6.5 6.0 - 8.5 g/dL Final     ALT (SGPT)   Date Value Ref Range Status   08/11/2021 17 1 - 33 U/L Final     AST (SGOT)   Date Value Ref Range Status   08/11/2021 11 1 - 32 U/L Final     Total Bilirubin   Date Value Ref Range Status   08/11/2021 0.2 0.0 - 1.2 mg/dL Final     Albumin   Date Value Ref Range Status   08/11/2021 4.00 3.50 - 5.20 g/dL Final     Globulin   Date Value Ref Range Status   08/11/2021 2.5 gm/dL Final     Lab Results   Component Value Date    WBC 12.69 (H) 11/30/2021    HGB 13.0 11/30/2021    HCT 38.7 11/30/2021    MCV 88.2 11/30/2021     11/30/2021     Lab Results   Component Value Date    NEUTROABS 8.01 (H) 11/30/2021    IRON 75 11/30/2021    IRON 64 07/29/2021    IRON 54 04/22/2021    TIBC 340 11/30/2021    TIBC 289 (L)  07/29/2021    TIBC 279 (L) 04/22/2021    LABIRON 22 11/30/2021    LABIRON 22 07/29/2021    LABIRON 19 (L) 04/22/2021    FERRITIN 212.00 (H) 11/30/2021    FERRITIN 233.30 (H) 07/29/2021    FERRITIN 351.00 (H) 04/22/2021    AZXXKQMN07 >2,000 (H) 11/30/2021    IKIZSRSO00 579 08/11/2021    MPMTAMZA27 580 07/29/2021    FOLATE >20.00 11/30/2021    FOLATE >20.00 07/29/2021    FOLATE >20.00 04/22/2021     Lab Results   Component Value Date    REFLABREPO SEE ATTACHED REPORT 09/30/2019    REFLABREPO SEE ATTACHED REPORT 09/30/2019             RADIOLOGY DATA :  No radiology results for the last 7 days        Assessment/Plan       1.  Thrombocytosis:  - Patient has intermittent thrombocytosis since 2015.  -lab reviewed today and platelet count is 447,000.  -Extensive work-up including Tony 2 mutation with exon 12, CALR mutation, MPL mutation done on September 30, 2019 is negative.  - At this point will continue with clinical monitoring.  Will ask patient to return to clinic in 4 months with repeat CBC and anemia work-up to be done     2.  Leukocytosis:  - Patient has persistent leukocytosis since 2015.  -White blood cell count is stable at 12,000.  CALR mutation, MPL mutation, Tony 2 mutation including exon 12 were negative.  At this point will continue with clinical monitoring.  - If patient has any worsening leukocytosis or thrombocytosis in future, she will need bone marrow biopsy which was discussed with patient.     3.  Iron deficiency:  -Patient is found to have iron deficiency with iron saturation of only 11% and ferritin of 46.  Patient states in the past she is not able to tolerate iron by mouth.  -Patient has received multiple doses of IV iron; most recently in June 2020 with dropping iron stores.  -Hgb today is 13.0 with stable iron stores; continue to monitor.  -continue with  monthly B12 injection by primary medical provider.  -will recheck labs again in 4 mos             PHQ-9 Total Score: 0     Ariana Martinez  reports a pain score of 0.  Given her pain assessment as noted, treatment options were discussed and the following options were decided upon as a follow-up plan to address the patient's pain: no pain today.         Teressa Hammond, APRN  12/3/2021  10:47 CST        Part of this note may be an electronic transcription/translation of spoken language to printed text using the Dragon Dictation System.          CC:

## 2021-12-04 DIAGNOSIS — E78.2 MIXED HYPERLIPIDEMIA: Chronic | ICD-10-CM

## 2021-12-06 RX ORDER — ATORVASTATIN CALCIUM 80 MG/1
TABLET, FILM COATED ORAL
Qty: 90 TABLET | Refills: 1 | Status: SHIPPED | OUTPATIENT
Start: 2021-12-06 | End: 2022-04-26

## 2021-12-07 LAB — GABAPENTIN UR-MCNC: 148.9 UG/ML

## 2021-12-10 LAB
6MAM UR QL CFM: NEGATIVE
6MAM/CREAT UR: NOT DETECTED NG/MG CREAT
7AMINOCLONAZEPAM/CREAT UR: NOT DETECTED NG/MG CREAT
A-OH ALPRAZ/CREAT UR: NOT DETECTED NG/MG CREAT
A-OH-TRIAZOLAM/CREAT UR CFM: NOT DETECTED NG/MG CREAT
ALFENTANIL/CREAT UR CFM: NOT DETECTED NG/MG CREAT
ALPHA-HYDROXYMIDAZOLAM, URINE: NOT DETECTED NG/MG CREAT
ALPRAZ/CREAT UR CFM: NOT DETECTED NG/MG CREAT
AMOBARBITAL UR QL CFM: NOT DETECTED
AMPHET/CREAT UR: NOT DETECTED NG/MG CREAT
AMPHETAMINES UR QL CFM: NEGATIVE
BARBITAL UR QL CFM: NOT DETECTED
BARBITURATES UR QL CFM: NEGATIVE
BENZODIAZ UR QL CFM: NORMAL
BUPRENORPHINE UR QL CFM: NEGATIVE
BUPRENORPHINE/CREAT UR: NOT DETECTED NG/MG CREAT
BUTABARBITAL UR QL CFM: NOT DETECTED
BUTALBITAL UR QL CFM: NOT DETECTED
BZE/CREAT UR: NOT DETECTED NG/MG CREAT
CANNABINOIDS UR QL CFM: NEGATIVE
CARBOXYTHC/CREAT UR: NOT DETECTED NG/MG CREAT
CLONAZEPAM/CREAT UR CFM: NOT DETECTED NG/MG CREAT
COCAETHYLENE/CREAT UR CFM: NOT DETECTED NG/MG CREAT
COCAINE UR QL CFM: NEGATIVE
COCAINE/CREAT UR CFM: NOT DETECTED NG/MG CREAT
CODEINE/CREAT UR: NOT DETECTED NG/MG CREAT
CREAT UR-MCNC: 84 MG/DL
DESALKYLFLURAZ/CREAT UR: NOT DETECTED NG/MG CREAT
DESMETHYLFLUNITRAZEPAM: NOT DETECTED NG/MG CREAT
DHC/CREAT UR: 192 NG/MG CREAT
DIAZEPAM/CREAT UR: NOT DETECTED NG/MG CREAT
DRUGS UR: NORMAL
EDDP/CREAT UR: NOT DETECTED NG/MG CREAT
ETHANOL UR CFM-MCNC: 0.26 G/DL
ETHANOL UR QL CFM: NORMAL
FENTANYL UR QL CFM: NEGATIVE
FENTANYL/CREAT UR: NOT DETECTED NG/MG CREAT
FLUNITRAZEPAM UR QL CFM: NOT DETECTED NG/MG CREAT
HYDROCODONE/CREAT UR: 933 NG/MG CREAT
HYDROMORPHONE/CREAT UR: 161 NG/MG CREAT
LEVEL OF DETECTION:: NORMAL
LORAZEPAM/CREAT UR: 194 NG/MG CREAT
MDA/CREAT UR: NOT DETECTED NG/MG CREAT
MDMA/CREAT UR: NOT DETECTED NG/MG CREAT
MEPHOBARBITAL UR QL CFM: NOT DETECTED
METHADONE UR QL CFM: NEGATIVE
METHADONE/CREAT UR: NOT DETECTED NG/MG CREAT
METHAMPHET/CREAT UR: NOT DETECTED NG/MG CREAT
MIDAZOLAM/CREAT UR CFM: NOT DETECTED NG/MG CREAT
MORPHINE/CREAT UR: NOT DETECTED NG/MG CREAT
N-NORTRAMADOL/CREAT UR CFM: NOT DETECTED NG/MG CREAT
NARCOTICS UR: NEGATIVE
NORBUPRENORPHINE/CREAT UR: NOT DETECTED NG/MG CREAT
NORCODEINE/CREAT UR CFM: NOT DETECTED NG/MG CREAT
NORDIAZEPAM/CREAT UR: NOT DETECTED NG/MG CREAT
NORFENTANYL/CREAT UR: NOT DETECTED NG/MG CREAT
NORHYDROCODONE/CREAT UR: 1145 NG/MG CREAT
NORMORPHINE UR-MCNC: NOT DETECTED NG/MG CREAT
NOROXYCODONE/CREAT UR: NOT DETECTED NG/MG CREAT
NOROXYMORPHONE/CREAT UR CFM: NOT DETECTED NG/MG CREAT
O-NORTRAMADOL UR CFM-MCNC: NOT DETECTED NG/MG CREAT
OPIATES UR QL CFM: NORMAL
OXAZEPAM/CREAT UR: NOT DETECTED NG/MG CREAT
OXYCODONE UR QL CFM: NEGATIVE
OXYCODONE/CREAT UR: NOT DETECTED NG/MG CREAT
OXYMORPHONE/CREAT UR: NOT DETECTED NG/MG CREAT
PENTOBARB UR QL CFM: NOT DETECTED
PHENOBARB UR QL CFM: NOT DETECTED
SECOBARBITAL UR QL CFM: NOT DETECTED
SUFENTANIL/CREAT UR CFM: NOT DETECTED NG/MG CREAT
TAPENTADOL UR QL CFM: NEGATIVE
TAPENTADOL/CREAT UR: NOT DETECTED NG/MG CREAT
TEMAZEPAM/CREAT UR: NOT DETECTED NG/MG CREAT
THIOPENTAL UR QL CFM: NOT DETECTED
TRAMADOL UR QL CFM: NOT DETECTED NG/MG CREAT

## 2021-12-13 DIAGNOSIS — E53.8 CYANOCOBALAMIN DEFICIENCY: ICD-10-CM

## 2021-12-13 NOTE — TELEPHONE ENCOUNTER
"Incoming Refill Request      Medication requested (name and dose):   Syringe/Needle, Disp, (Luer Lock Safety Syringes) 25G X 5/8\" 3 ML [facility]    Pharmacy where request should be sent:  [facility]   Additional details provided by patient:    Best call back number:     Does the patient have less than a 3 day supply:  [] Yes  [] No    Tiffanie Vaughan  12/13/21, 10:31 CST          "

## 2021-12-14 RX ORDER — ERGOCALCIFEROL 1.25 MG/1
CAPSULE ORAL
Qty: 12 CAPSULE | Refills: 2 | Status: SHIPPED | OUTPATIENT
Start: 2021-12-14 | End: 2021-12-16

## 2021-12-16 DIAGNOSIS — G89.29 CHRONIC RIGHT-SIDED LOW BACK PAIN WITH RIGHT-SIDED SCIATICA: ICD-10-CM

## 2021-12-16 DIAGNOSIS — I10 ESSENTIAL HYPERTENSION: Chronic | ICD-10-CM

## 2021-12-16 DIAGNOSIS — M62.838 MUSCLE SPASM: ICD-10-CM

## 2021-12-16 DIAGNOSIS — G54.6 PHANTOM PAIN AFTER AMPUTATION OF LOWER EXTREMITY (HCC): Chronic | ICD-10-CM

## 2021-12-16 DIAGNOSIS — G47.00 INSOMNIA, UNSPECIFIED TYPE: ICD-10-CM

## 2021-12-16 DIAGNOSIS — M54.41 CHRONIC RIGHT-SIDED LOW BACK PAIN WITH RIGHT-SIDED SCIATICA: ICD-10-CM

## 2021-12-16 RX ORDER — HYDROCHLOROTHIAZIDE 25 MG/1
TABLET ORAL
Qty: 90 TABLET | Refills: 0 | Status: SHIPPED | OUTPATIENT
Start: 2021-12-16 | End: 2022-01-13

## 2021-12-16 RX ORDER — METOCLOPRAMIDE 10 MG/1
10 TABLET ORAL 4 TIMES DAILY PRN
Qty: 120 TABLET | Refills: 1 | Status: SHIPPED | OUTPATIENT
Start: 2021-12-16 | End: 2022-02-17

## 2021-12-16 RX ORDER — MIRTAZAPINE 30 MG/1
TABLET, FILM COATED ORAL
Qty: 90 TABLET | Refills: 1 | Status: SHIPPED | OUTPATIENT
Start: 2021-12-16 | End: 2022-05-20

## 2021-12-16 RX ORDER — METHOCARBAMOL 500 MG/1
500 TABLET, FILM COATED ORAL 2 TIMES DAILY PRN
Qty: 60 TABLET | Refills: 5 | Status: ON HOLD | OUTPATIENT
Start: 2021-12-16 | End: 2022-06-07

## 2021-12-16 RX ORDER — ERGOCALCIFEROL 1.25 MG/1
CAPSULE ORAL
Qty: 36 CAPSULE | Refills: 1 | Status: SHIPPED | OUTPATIENT
Start: 2021-12-16 | End: 2022-06-27 | Stop reason: SDUPTHER

## 2021-12-16 RX ORDER — METOPROLOL SUCCINATE 50 MG/1
50 TABLET, EXTENDED RELEASE ORAL DAILY
Qty: 90 TABLET | Refills: 1 | Status: SHIPPED | OUTPATIENT
Start: 2021-12-16 | End: 2022-04-26

## 2021-12-16 NOTE — TELEPHONE ENCOUNTER
Incoming Refill Request      Medication requested (name and dose):   HYDROcodone-acetaminophen (NORCO) 7.5-325 MG per tablet        Pharmacy where request should be sent:  [facility]     Additional details provided by patient:     Best call back number:     Does the patient have less than a 3 day supply:  [] Yes  [] No    Tiffanie Vaughan  12/16/21, 15:34 CST

## 2021-12-17 RX ORDER — ONDANSETRON HYDROCHLORIDE 8 MG/1
8 TABLET, FILM COATED ORAL EVERY 8 HOURS PRN
Qty: 18 TABLET | Refills: 1 | Status: SHIPPED | OUTPATIENT
Start: 2021-12-17 | End: 2022-09-07 | Stop reason: SDUPTHER

## 2021-12-18 ENCOUNTER — TELEPHONE (OUTPATIENT)
Dept: FAMILY MEDICINE CLINIC | Facility: CLINIC | Age: 61
End: 2021-12-18

## 2021-12-18 RX ORDER — HYDROCODONE BITARTRATE AND ACETAMINOPHEN 7.5; 325 MG/1; MG/1
1 TABLET ORAL EVERY 6 HOURS PRN
Qty: 120 TABLET | Refills: 0 | Status: SHIPPED | OUTPATIENT
Start: 2021-12-18 | End: 2022-01-20 | Stop reason: SDUPTHER

## 2021-12-18 NOTE — TELEPHONE ENCOUNTER
Patient seen in office every 3 months for chronic pain requiring opiate pain medication. Compliant with medication, visits with no adverse effects noted. LESTER and UDS current and appropriate. Patient called requesting scheduled refill at appropriate interval. Patient understands the risks associated with this controlled medication, including tolerance and addiction.  Patient also agrees to only obtain this medication from me, and not from a another provider, unless that provider is covering for me in my absence.  Patient also agrees to be compliant in dosing, and not self adjust the dose of medication.  A signed controlled substance agreement is on file, and the patient has received a controlled substance education sheet at this a previous visit.  The patient has also signed a consent for treatment with a controlled substance as per [facility] policy. LESTER was obtained.   Refill sent for hydrocodone.     This document has been electronically signed by BEBE Palomino on December 18, 2021 10:45 CST

## 2021-12-29 RX ORDER — INSULIN ASPART 100 [IU]/ML
INJECTION, SOLUTION INTRAVENOUS; SUBCUTANEOUS
Qty: 15 ML | Refills: 3 | Status: SHIPPED | OUTPATIENT
Start: 2021-12-29 | End: 2022-04-21

## 2021-12-29 RX ORDER — INSULIN ASPART 100 [IU]/ML
INJECTION, SOLUTION INTRAVENOUS; SUBCUTANEOUS
Qty: 15 ML | Refills: 3 | Status: SHIPPED | OUTPATIENT
Start: 2021-12-29 | End: 2021-12-29 | Stop reason: SDUPTHER

## 2022-01-05 ENCOUNTER — OFFICE VISIT (OUTPATIENT)
Dept: CARDIOLOGY | Facility: CLINIC | Age: 62
End: 2022-01-05

## 2022-01-05 VITALS
HEIGHT: 68 IN | SYSTOLIC BLOOD PRESSURE: 118 MMHG | WEIGHT: 229 LBS | DIASTOLIC BLOOD PRESSURE: 68 MMHG | HEART RATE: 74 BPM | BODY MASS INDEX: 34.71 KG/M2 | TEMPERATURE: 97.7 F | OXYGEN SATURATION: 99 %

## 2022-01-05 DIAGNOSIS — Z95.1 S/P CABG (CORONARY ARTERY BYPASS GRAFT): ICD-10-CM

## 2022-01-05 DIAGNOSIS — I25.10 CORONARY ARTERY DISEASE INVOLVING NATIVE CORONARY ARTERY OF NATIVE HEART WITHOUT ANGINA PECTORIS: Primary | ICD-10-CM

## 2022-01-05 DIAGNOSIS — I50.32 CHRONIC HEART FAILURE WITH PRESERVED EJECTION FRACTION: ICD-10-CM

## 2022-01-05 DIAGNOSIS — E11.65 TYPE 2 DIABETES MELLITUS WITH HYPERGLYCEMIA, WITH LONG-TERM CURRENT USE OF INSULIN: ICD-10-CM

## 2022-01-05 DIAGNOSIS — Z79.4 TYPE 2 DIABETES MELLITUS WITH HYPERGLYCEMIA, WITH LONG-TERM CURRENT USE OF INSULIN: ICD-10-CM

## 2022-01-05 DIAGNOSIS — Z89.512 S/P BKA (BELOW KNEE AMPUTATION) UNILATERAL, LEFT: ICD-10-CM

## 2022-01-05 DIAGNOSIS — E78.2 MIXED HYPERLIPIDEMIA: ICD-10-CM

## 2022-01-05 DIAGNOSIS — E66.01 CLASS 3 SEVERE OBESITY DUE TO EXCESS CALORIES WITHOUT SERIOUS COMORBIDITY WITH BODY MASS INDEX (BMI) OF 40.0 TO 44.9 IN ADULT: ICD-10-CM

## 2022-01-05 DIAGNOSIS — I10 PRIMARY HYPERTENSION: ICD-10-CM

## 2022-01-05 PROCEDURE — 99214 OFFICE O/P EST MOD 30 MIN: CPT | Performed by: INTERNAL MEDICINE

## 2022-01-05 NOTE — PROGRESS NOTES
Ariana Martinez  62 y.o. female      1. Coronary artery disease involving native coronary artery of native heart without angina pectoris    2. S/P CABG (coronary artery bypass graft)    3. Primary hypertension    4. Mixed hyperlipidemia    5. S/P BKA (below knee amputation) unilateral, left (HCC)    6. Type 2 diabetes mellitus with hyperglycemia, with long-term current use of insulin ([facility])    7. Class 3 severe obesity due to excess calories without serious comorbidity with body mass index (BMI) of 40.0 to 44.9 in adult ([facility])    8. Chronic heart failure with preserved ejection fraction (HCC)        History of Present Illness  Ariana Martinez is a 62-year-old  lady who is being seen by me for the first time.  She was a patient of Dr. Kwon in the past.  She has a complex medical history which is remarkable for coronary artery disease status post coronary artery bypass surgery in 2005, insulin-dependent type 2 diabetes mellitus with complications including gastroparesis, hypertension, anxiety/depression, hyperlipidemia, GERD,   HFpEF,  vitamin D deficiency. She was treated for left foot pain and found to have hardware infection with MSSA and Acinetobacter.  She was treated for infective endocarditis of the mitral valve.  She was also noted to have a pneumonia on CTA at [facility] in March 2021.    She was admitted to [facility] for evaluation of chest pain in May 2021.  Cardiac enzymes are negative for myocardial injury and EKG showed no acute ST-T wave changes.  Her blood pressure was noted to be markedly elevated.  D-dimer was elevated and CT of the chest was negative for PE.  Lexiscan Cardiolite stress test showed:  · EKG findings equivocal secondary to baseline artifacts during Lexiscan infusion  · Small apical fixed defect noted. No significant reversible ischemia noted. Perfusion in the septum, anterior wall, lateral wall and inferior wall appear normal  · Ejection fraction 60%. No wall  motion abnormalities  · Low risk study  Echocardiogram showed:  · The study is technically difficult for diagnosis. The quality of the study is limited due to patient body habitus and lung disease.  · Estimated left ventricular EF = 67% Left ventricular ejection fraction appears to be 66 - 70%. Left ventricular systolic function is normal.  · Left ventricular diastolic function is consistent with (grade I) impaired relaxation.  · Estimated right ventricular systolic pressure from tricuspid regurgitation is normal (<35 mmHg).      Her last cardiac catheterization was in February 2020 by Dr. Cooper which showed:  This patient who had a LIMA bypass in 2005 that is nonfunctioning.  The LAD is total from the midportion on.  The only flow given to the LAD region which also would include the apex in the inferoapical portion on a wraparound LAD is in the proximal LAD going into the diagonal branch.  This is been successfully stented with drug-eluting stents x2.  She has flow into the circumflex and right coronary artery and now except for the mid LAD portion is completely revascularized once again.     Patient denied any cardiac symptoms and no chest pain, shortness of breath or palpitations reported.  Clinical exam showed normal heart rate and blood pressure and no signs of congestive heart failure.  She has been compliant with her medications.    Allergies   Allergen Reactions   • Seroquel [Quetiapine] Anaphylaxis   • Avandia [Rosiglitazone] Swelling   • Morphine And Related Hallucinations   • Oxycodone Hallucinations         Past Medical History:   Diagnosis Date   • Angina, class IV ([facility])    • Anxiety    • Anxiety and depression    • Arthritis    • Benign paroxysmal positional vertigo    • Bladder disorder     has bladder stimulator   • Carpal tunnel syndrome    • CHF (congestive heart failure) ([facility])    • Chronic pain    • Coronary atherosclerosis     hx CABG 2005.  is followed by Dr Kwon   • Depression    •  Diabetes mellitus (HCC)     Type 2, controlled   • Diabetic polyneuropathy (HCC)    • Disease of thyroid gland    • Elevated cholesterol    • Female stress incontinence    • Foot pain, left    • Full dentures    • Gastroparesis    • GERD (gastroesophageal reflux disease)    • Hyperlipidemia    • Hypertension    • Low back pain    • Malaise and fatigue    • Multiple joint pain    • Obesity     Refuses to be weighed   • Occlusion and stenosis of bilateral carotid arteries 6/18/2021   • Otalgia     Both   • Perforation of tympanic membrane     Left   • Postoperative wound infection    • Vitamin D deficiency    • Wears glasses     reading         Past Surgical History:   Procedure Laterality Date   • ABDOMINAL SURGERY     • AMPUTATION FOOT     • ANGIOPLASTY      coronary   • ANKLE ARTHROSCOPY Left 2/26/2021    Procedure: Left foot hardware removal, ankle arthroscopy, bone grafting , left foot exostectomy;  Surgeon: Ignacio Lord DPM;  Location: [facility] OR;  Service: Podiatry;  Laterality: Left;   • BREAST BIOPSY Right    • CARDIAC CATHETERIZATION     • CARDIAC CATHETERIZATION N/A 6/20/2017    Procedure: Right Heart Cath;  Surgeon: Can Kwon MD PhD;  Location: [facility] CATH INVASIVE LOCATION;  Service:    • CARDIAC CATHETERIZATION N/A 2/18/2020    Procedure: Left Heart Cath;  Surgeon: Catalina Cooper MD;  Location: [facility] CATH INVASIVE LOCATION;  Service: Cardiology;  Laterality: N/A;   • CARPAL TUNNEL RELEASE     • CHOLECYSTECTOMY     • COLONOSCOPY N/A 6/24/2020    Procedure: COLONOSCOPY;  Surgeon: Julián Maldonado MD;  Location: [facility] ENDOSCOPY;  Service: Gastroenterology;  Laterality: N/A;   • CORONARY ARTERY BYPASS GRAFT  2005   • ENDOSCOPY N/A 10/19/2018    Procedure: ESOPHAGOGASTRODUODENOSCOPY possible dilation;  Surgeon: Julián Maldonado MD;  Location: [facility] ENDOSCOPY;  Service: Gastroenterology   • ENDOSCOPY N/A 6/24/2020    Procedure: ESOPHAGOGASTRODUODENOSCOPY WED appt please;  Surgeon:  Julián Maldonado MD;  Location: [facility] ENDOSCOPY;  Service: Gastroenterology;  Laterality: N/A;   • ENDOSCOPY AND COLONOSCOPY     • FOOT SURGERY      Toes   • FOOT SURGERY     • GASTRIC BANDING      Revision, laparoscopic   • HYSTERECTOMY     • INCISION AND DRAINAGE LEG Left 3/12/2021    Procedure: Left ankle arthroscopic irrigation and debridement, screw removal;  Surgeon: Ignacio Lord DPM;  Location: [facility] OR;  Service: Podiatry;  Laterality: Left;   • MOUTH SURGERY     • SALPINGO OOPHORECTOMY     • SHOULDER SURGERY     • SUBTALAR ARTHRODESIS Left 1/16/2019    Procedure: LEFT FOOT HARDWARE REMOVAL, FIFTH METATARSAL , OPEN REDUCTION INTERNAL FIXATION, CALCANEAL OSTEOTOMY;  Surgeon: Ignacio Lord DPM;  Location: [facility] OR;  Service: Podiatry   • SUBTALAR ARTHRODESIS Left 10/16/2019    Procedure: foot hardware removal, subtalar joint fusion  possible de/reattachment of achilles tendon        (c-arm);  Surgeon: Ignacio Lord DPM;  Location: [facility] OR;  Service: Podiatry   • SUBTALAR ARTHRODESIS Left 9/30/2020    Procedure: subtalar, talonavicular joint arthrodesis.  Removal hardware.          (c-arm);  Surgeon: Ignacio Lord DPM;  Location: [facility] OR;  Service: Podiatry;  Laterality: Left;   • TRANSESOPHAGEAL ECHOCARDIOGRAM (LAMONTE)      With color flow         Family History   Problem Relation Age of Onset   • Diabetes Other    • Heart disease Other    • Hypertension Other    • Heart disease Mother    • Stroke Mother    • Hypertension Mother    • Diabetes Sister    • Heart disease Sister    • Hypertension Sister    • Heart disease Sister    • Diabetes Sister    • Hypertension Sister    • Diabetes Sister    • Diabetes Sister    • Diabetes Sister    • Diabetes Sister          Social History     Socioeconomic History   • Marital status:    Tobacco Use   • Smoking status: Never Smoker   • Smokeless tobacco: Never Used   Vaping Use   • Vaping Use: Never used   Substance and Sexual Activity    • Alcohol use: No   • Drug use: No   • Sexual activity: Defer         Current Outpatient Medications   Medication Sig Dispense Refill   • ARIPiprazole (ABILIFY) 15 MG tablet TAKE 1 TABLET BY MOUTH EVERY DAY 90 tablet 1   • aspirin (aspirin) 81 MG EC tablet Take 81 mg by mouth Daily.     • atorvastatin (LIPITOR) 80 MG tablet TAKE 1 TABLET BY MOUTH EVERY DAY 90 tablet 1   • BD SHARPS CONTAINER HOME misc 1 each Take As Directed. 1 each 0   • Blood Glucose Monitoring Suppl (ONE TOUCH ULTRA MINI) w/Device kit Patient will need the Accu-Check Avitio meter 1 each 0   • Calcium Citrate-Vitamin D (CITRACAL/VITAMIN D) 250-200 MG-UNIT tablet Take 2 tablets by mouth 2 (two) times a day.     • clopidogrel (PLAVIX) 75 MG tablet TAKE 1 TABLET BY MOUTH EVERY DAY 90 tablet 1   • cyanocobalamin 1000 MCG/ML injection INJECT 1 ML INTO THE APPROPRIATE MUSCLE AS DIRECTED BY PRESCRIBER EVERY 28 (TWENTY-EIGHT) DAYS. 3 mL 2   • folic acid (FOLVITE) 1 MG tablet Take 1 tablet by mouth Daily. 90 tablet 1   • furosemide (LASIX) 40 MG tablet TAKE 1 TABLET BY MOUTH EVERY DAY 90 tablet 1   • gabapentin (NEURONTIN) 400 MG capsule Take 1 capsule by mouth 3 (Three) Times a Day. 90 capsule 2   • GLUCAGON EMERGENCY 1 MG injection USE AS DIRECTED AS NEEDED 1 kit 0   • glucose blood (Accu-Chek Iris Plus) test strip Use as instructed 100 each 3   • glucose monitor monitoring kit 1 each Daily. accucheck iris meter, E11.9 1 each 0   • hydroCHLOROthiazide (HYDRODIURIL) 25 MG tablet TAKE 1 TABLET BY MOUTH EVERY DAY 90 tablet 0   • HYDROcodone-acetaminophen (NORCO) 7.5-325 MG per tablet Take 1 tablet by mouth Every 6 (Six) Hours As Needed for Moderate Pain . 120 tablet 0   • insulin aspart (NovoLOG FlexPen) 100 UNIT/ML solution pen-injector sc pen Inject 0 to 14 units under the skin into the appropriate area 3 (three) times daily before meals according to sliding scale on attached sheet. 15 mL 3   • Insulin Glargine (BASAGLAR KWIKPEN) 100 UNIT/ML injection  "pen Inject 60 Units under the skin into the appropriate area as directed 2 (Two) Times a Day. 120 mL 11   • Insulin Pen Needle (B-D ULTRAFINE III SHORT PEN) 31G X 8 MM misc USE TO INJECT 5 TIMES A  each 11   • Insulin Pen Needle 31G X 8 MM misc Use up to 4 (four) times daily with insulin as directed. 100 each 0   • Lancets (accu-chek soft touch) lancets As directed 100 each 12   • lisinopril (PRINIVIL,ZESTRIL) 20 MG tablet TAKE 1 TABLET BY MOUTH EVERY DAY 90 tablet 1   • LORazepam (ATIVAN) 0.5 MG tablet Take 1 tablet by mouth Every 8 (Eight) Hours. 90 tablet 0   • meclizine (ANTIVERT) 25 MG tablet Take 1 tablet by mouth 3 (Three) Times a Day As Needed for dizziness. 90 tablet 6   • methocarbamol (ROBAXIN) 500 MG tablet TAKE 1 TABLET BY MOUTH 2 (TWO) TIMES A DAY AS NEEDED FOR MUSCLE SPASMS. 60 tablet 5   • metoclopramide (REGLAN) 10 MG tablet TAKE 1 TABLET BY MOUTH 4 (FOUR) TIMES A DAY AS NEEDED (NAUSEA). 120 tablet 1   • metoprolol succinate XL (TOPROL-XL) 50 MG 24 hr tablet TAKE 1 TABLET BY MOUTH DAILY. DOSE INCREASED 5/24/21 90 tablet 1   • mirtazapine (REMERON) 30 MG tablet TAKE 1 TABLET BY MOUTH EVERY DAY AT NIGHT 90 tablet 1   • naloxone (NARCAN) 0.4 MG/ML injection Infuse 0.5 mL into a venous catheter Every 5 (Five) Minutes As Needed for Opioid Reversal.     • nitroglycerin (NITROSTAT) 0.4 MG SL tablet Place 1 tablet under the tongue Every 5 (Five) Minutes As Needed for Chest Pain. Take no more than 3 doses in 15 minutes. 20 tablet 11   • ofloxacin (FLOXIN) 0.3 % otic solution Administer 5 drops to the right ear 2 (Two) Times a Day. 10 mL 1   • ondansetron (ZOFRAN) 8 MG tablet TAKE 1 TABLET BY MOUTH EVERY 8 (EIGHT) HOURS AS NEEDED FOR NAUSEA OR VOMITING. 18 tablet 1   • pantoprazole (PROTONIX) 20 MG EC tablet Take 1 tablet by mouth Daily. 90 tablet 3   • Syringe/Needle, Disp, (Luer Lock Safety Syringes) 25G X 5/8\" 3 ML misc 1 each Daily. 1 Q28 DAYS 1 each 2   • tamsulosin (FLOMAX) 0.4 MG capsule 24 hr " "capsule Take 1 capsule by mouth Daily. 30 capsule 0   • venlafaxine XR (EFFEXOR-XR) 150 MG 24 hr capsule TAKE 1 CAPSULE BY MOUTH TWICE A DAY 90 capsule 1   • vitamin D (ERGOCALCIFEROL) 1.25 MG (20688 UT) capsule capsule TAKE ONE CAPSULE BY MOUTH EVERY SUNDAY. 36 capsule 1     No current facility-administered medications for this visit.         OBJECTIVE    /68 (BP Location: Left arm, Patient Position: Sitting, Cuff Size: Adult)   Pulse 74   Temp 97.7 °F (36.5 °C)   Ht 172.7 cm (68\")   Wt 104 kg (229 lb)   SpO2 99%   BMI 34.82 kg/m²         Review of Systems: The following systems are reviewed and no changes noted    Constitutional:  Denies recent weight loss, weight gain, fever or chills     HENT:  Denies any hearing loss, epistaxis, hoarseness, or difficulty speaking.     Eyes: Wears eyeglasses or contact lenses     Respiratory:  Denies dyspnea with exertion,no cough, wheezing, or hemoptysis.     Cardiovascular: Negative for palpations, chest pain, orthopnea, PND.  See HPI    Gastrointestinal:  Denies change in bowel habits, dyspepsia, ulcer disease, hematochezia, or melena.     Endocrine: Negative for cold intolerance, heat intolerance, polydipsia, polyphagia and polyuria.     Genitourinary: Negative.      Musculoskeletal: BKA left     Neurological:  Denies any history of recurrent headaches, strokes, TIA, or seizure disorder.     Hematological: Denies any food allergies, seasonal allergies, bleeding disorders, or lymphadenopathy.       Physical Exam: The following systems are reviewed and no changes noted    Constitutional: Cooperative, alert and oriented,  in no acute distress.     HENT:   Head: Normocephalic, normal hair patterns, no masses or tenderness.  Ears, Nose, and Throat: No gross abnormalities. No pallor or cyanosis. Dentition good.   Eyes: EOMS intact, PERRL, conjunctivae and lids unremarkable. Fundoscopic exam and visual fields not performed.   Neck: No palpable masses or adenopathy, no " thyromegaly, no JVD, carotid pulses are full and equal bilaterally and without  Bruits.     Cardiovascular: Regular rhythm, S1 and S2 normal, no S3 or S4.  No murmurs, gallops, or rubs detected.     Pulmonary/Chest: Chest: normal symmetry, normal respiratory excursion, no intercostal retraction, no use of accessory muscles.            Pulmonary: Normal breath sounds. No rales or ronchi.    Abdominal: Abdomen soft, bowel sounds normoactive, no masses, no hepatosplenomegaly, non-tender, no bruits.     Musculoskeletal: No deformities, clubbing, cyanosis, erythema, or edema observed.     Neurological: No gross motor or sensory deficits noted, affect appropriate, oriented to time, person, place.     Skin: Warm and dry to the touch, no apparent skin lesions or masses noted.     Psychiatric: She has a normal mood and affect. Her behavior is normal. Judgment and thought content normal.         Procedures      Lab Results   Component Value Date    WBC 12.69 (H) 11/30/2021    HGB 13.0 11/30/2021    HCT 38.7 11/30/2021    MCV 88.2 11/30/2021     11/30/2021     Lab Results   Component Value Date    GLUCOSE 105 (H) 08/11/2021    BUN 22 (H) 08/11/2021    CREATININE 0.89 08/11/2021    EGFRIFNONA 65 08/11/2021    BCR 24.7 08/11/2021    CO2 26.4 08/11/2021    CALCIUM 9.5 08/11/2021    ALBUMIN 4.00 08/11/2021    AST 11 08/11/2021    ALT 17 08/11/2021     Lab Results   Component Value Date    CHOL 151 08/11/2021    CHOL 142 07/03/2021    CHOL 149 03/05/2021     Lab Results   Component Value Date    TRIG 72 08/11/2021    TRIG 87 07/03/2021    TRIG 186 (H) 03/05/2021     Lab Results   Component Value Date    HDL 52 08/11/2021    HDL 41 07/03/2021    HDL 38 (L) 03/05/2021     No components found for: LDLCALC  Lab Results   Component Value Date    LDL 85 08/11/2021    LDL 84 07/03/2021    LDL 79 03/05/2021     No results found for: HDLLDLRATIO  No components found for: CHOLHDL  Lab Results   Component Value Date    HGBA1C 8.90 (H)  08/11/2021     Lab Results   Component Value Date    TSH 1.410 08/11/2021    S2GVBOW 9.70 08/30/2017    THYROIDAB <6 01/02/2015           ASSESSMENT AND PLAN  Ariana Martinez is a 62-year-old lady with a rather complex medical history as discussed in detail under history of present illness.  She denies any cardiac symptoms at the present time and no signs of arrhythmia or congestive heart failure noted.   Her lab results from August 2021 were reviewed and LDL was 85 and HDL 52.  Her hemoglobin A1c was 8.9.  Compliance with diet and medication stressed.   I have continued antiplatelet therapy with aspirin and Plavix, lipid-lowering therapy with atorvastatin, antihypertensive therapy with metoprolol succinate, lisinopril, HCTZ.  She is on Lasix as well.    45 minutes were spent in the assessment and evaluation of this patient    Diagnoses and all orders for this visit:    1. Coronary artery disease involving native coronary artery of native heart without angina pectoris (Primary)    2. S/P CABG (coronary artery bypass graft)    3. Primary hypertension    4. Mixed hyperlipidemia    5. S/P BKA (below knee amputation) unilateral, left ([facility])    6. Type 2 diabetes mellitus with hyperglycemia, with long-term current use of insulin ([facility])    7. Class 3 severe obesity due to excess calories without serious comorbidity with body mass index (BMI) of 40.0 to 44.9 in adult ([facility])    8. Chronic heart failure with preserved ejection fraction (HCC)        Patient's Body mass index is 34.82 kg/m². indicating that she is obese (BMI >30). Obesity-related health conditions include the following: hypertension, coronary heart disease, diabetes mellitus and dyslipidemias. Obesity is unchanged. BMI is is above average; no BMI management plan is appropriate. We discussed portion control and increasing exercise..      Ariana Martinez  reports that she has never smoked. She has never used smokeless tobacco..          Bill Gibson,  MD  1/5/2022  08:59 CST

## 2022-01-12 DIAGNOSIS — I10 ESSENTIAL HYPERTENSION: Chronic | ICD-10-CM

## 2022-01-13 RX ORDER — FUROSEMIDE 40 MG/1
TABLET ORAL
Qty: 90 TABLET | Refills: 1 | Status: ON HOLD | OUTPATIENT
Start: 2022-01-13 | End: 2022-06-03 | Stop reason: SDUPTHER

## 2022-01-13 RX ORDER — HYDROCHLOROTHIAZIDE 25 MG/1
TABLET ORAL
Qty: 90 TABLET | Refills: 0 | Status: SHIPPED | OUTPATIENT
Start: 2022-01-13 | End: 2022-02-25

## 2022-01-20 DIAGNOSIS — M54.41 CHRONIC RIGHT-SIDED LOW BACK PAIN WITH RIGHT-SIDED SCIATICA: ICD-10-CM

## 2022-01-20 DIAGNOSIS — G89.29 CHRONIC RIGHT-SIDED LOW BACK PAIN WITH RIGHT-SIDED SCIATICA: ICD-10-CM

## 2022-01-20 DIAGNOSIS — G54.6 PHANTOM PAIN AFTER AMPUTATION OF LOWER EXTREMITY: Chronic | ICD-10-CM

## 2022-01-20 NOTE — TELEPHONE ENCOUNTER
Incoming Refill Request      Medication requested (name and dose):   [place]    Pharmacy where request should be sent:   [facility] [place]     Additional details provided by patient:     Best call back number:     Does the patient have less than a 3 day supply:  [] Yes  [] No    Tiffanie Vaughan  01/20/22, 08:49 CST

## 2022-01-21 ENCOUNTER — TELEPHONE (OUTPATIENT)
Dept: FAMILY MEDICINE CLINIC | Facility: CLINIC | Age: 62
End: 2022-01-21

## 2022-01-21 RX ORDER — HYDROCODONE BITARTRATE AND ACETAMINOPHEN 7.5; 325 MG/1; MG/1
1 TABLET ORAL EVERY 6 HOURS PRN
Qty: 120 TABLET | Refills: 0 | Status: SHIPPED | OUTPATIENT
Start: 2022-01-21 | End: 2022-02-17 | Stop reason: SDUPTHER

## 2022-01-21 NOTE — TELEPHONE ENCOUNTER
Patient seen in office every 3 months for chronic pain requiring opiate pain medication. Compliant with medication, visits with no adverse effects noted. LESTER and UDS current and appropriate. Patient called requesting scheduled refill at appropriate interval. Patient understands the risks associated with this controlled medication, including tolerance and addiction.  Patient also agrees to only obtain this medication from me, and not from a another provider, unless that provider is covering for me in my absence.  Patient also agrees to be compliant in dosing, and not self adjust the dose of medication.  A signed controlled substance agreement is on file, and the patient has received a controlled substance education sheet at this a previous visit.  The patient has also signed a consent for treatment with a controlled substance as per [facility] policy. LESTER was obtained.   Refill sent for hydrocodone.     This document has been electronically signed by BEBE Palomino on January 21, 2022 15:50 CST

## 2022-01-23 DIAGNOSIS — F33.1 DEPRESSION, MAJOR, RECURRENT, MODERATE: ICD-10-CM

## 2022-01-24 RX ORDER — BLOOD SUGAR DIAGNOSTIC
STRIP MISCELLANEOUS
Qty: 100 EACH | Refills: 3 | Status: SHIPPED | OUTPATIENT
Start: 2022-01-24 | End: 2022-04-14 | Stop reason: SDUPTHER

## 2022-01-24 RX ORDER — METOCLOPRAMIDE 10 MG/1
10 TABLET ORAL 4 TIMES DAILY PRN
Qty: 120 TABLET | Refills: 1 | OUTPATIENT
Start: 2022-01-24

## 2022-01-24 RX ORDER — ARIPIPRAZOLE 15 MG/1
TABLET ORAL
Qty: 90 TABLET | Refills: 1 | Status: SHIPPED | OUTPATIENT
Start: 2022-01-24 | End: 2022-06-17 | Stop reason: HOSPADM

## 2022-02-01 ENCOUNTER — OFFICE VISIT (OUTPATIENT)
Dept: GASTROENTEROLOGY | Facility: CLINIC | Age: 62
End: 2022-02-01

## 2022-02-01 VITALS
BODY MASS INDEX: 34.82 KG/M2 | SYSTOLIC BLOOD PRESSURE: 104 MMHG | HEART RATE: 64 BPM | DIASTOLIC BLOOD PRESSURE: 54 MMHG | HEIGHT: 68 IN

## 2022-02-01 DIAGNOSIS — K21.00 GASTROESOPHAGEAL REFLUX DISEASE WITH ESOPHAGITIS WITHOUT HEMORRHAGE: Primary | ICD-10-CM

## 2022-02-01 DIAGNOSIS — K31.84 GASTROPARESIS: ICD-10-CM

## 2022-02-01 PROCEDURE — 99213 OFFICE O/P EST LOW 20 MIN: CPT | Performed by: NURSE PRACTITIONER

## 2022-02-01 NOTE — PATIENT INSTRUCTIONS
"BMI for Adults  What is BMI?  Body mass index (BMI) is a number that is calculated from a person's weight and height. BMI can help estimate how much of a person's weight is composed of fat. BMI does not measure body fat directly. Rather, it is an alternative to procedures that directly measure body fat, which can be difficult and expensive.  BMI can help identify people who may be at higher risk for certain medical problems.  What are BMI measurements used for?  BMI is used as a screening tool to identify possible weight problems. It helps determine whether a person is obese, overweight, a healthy weight, or underweight.  BMI is useful for:  · Identifying a weight problem that may be related to a medical condition or may increase the risk for medical problems.  · Promoting changes, such as changes in diet and exercise, to help reach a healthy weight. BMI screening can be repeated to see if these changes are working.  How is BMI calculated?  BMI involves measuring your weight in relation to your height. Both height and weight are measured, and the BMI is calculated from those numbers. This can be done either in English (U[place] or metric measurements. Note that charts and online BMI calculators are available to help you find your BMI quickly and easily without having to do these calculations yourself.  To calculate your BMI in English (U.S[place] measurements:    1. Measure your weight in pounds (lb).  2. Multiply the number of pounds by 703.  ? For example, for a person who weighs 180 lb, multiply that number by 703, which equals 126,540.  3. Measure your height in inches. Then multiply that number by itself to get a measurement called \"inches squared.\"  ? For example, for a person who is 70 inches tall, the \"inches squared\" measurement is 70 inches x 70 inches, which equals 4,900 inches squared.  4. Divide the total from step 2 (number of lb x 703) by the total from step 3 (inches squared): 126,540 ÷ 4,900 = 25.8. This is " "your BMI.    To calculate your BMI in metric measurements:  1. Measure your weight in kilograms (kg).  2. Measure your height in meters (m). Then multiply that number by itself to get a measurement called \"meters squared.\"  ? For example, for a person who is 1.75 m tall, the \"meters squared\" measurement is 1.75 m x 1.75 m, which is equal to 3.1 meters squared.  3. Divide the number of kilograms (your weight) by the meters squared number. In this example: 70 ÷ 3.1 = 22.6. This is your BMI.  What do the results mean?  BMI charts are used to identify whether you are underweight, normal weight, overweight, or obese. The following guidelines will be used:  · Underweight: BMI less than 18.5.  · Normal weight: BMI between 18.5 and 24.9.  · Overweight: BMI between 25 and 29.9.  · Obese: BMI of 30 or above.  Keep these notes in mind:  · Weight includes both fat and muscle, so someone with a muscular build, such as an athlete, may have a BMI that is higher than 24.9. In cases like these, BMI is not an accurate measure of body fat.  · To determine if excess body fat is the cause of a BMI of 25 or higher, further assessments may need to be done by a health care provider.  · BMI is usually interpreted in the same way for men and women.  Where to find more information  For more information about BMI, including tools to quickly calculate your BMI, go to these websites:  · Centers for Disease Control and Prevention: www.cdc.gov  · American Heart Association: www.heart.org  · National Heart, Lung, and Blood [place]: www.nhlbi.nih.gov  Summary  · Body mass index (BMI) is a number that is calculated from a person's weight and height.  · BMI may help estimate how much of a person's weight is composed of fat. BMI can help identify those who may be at higher risk for certain medical problems.  · BMI can be measured using English measurements or metric measurements.  · BMI charts are used to identify whether you are underweight, normal " weight, overweight, or obese.  This information is not intended to replace advice given to you by your health care provider. Make sure you discuss any questions you have with your health care provider.  Document Revised: 09/09/2020 Document Reviewed: 07/17/2020  Elsevier Patient Education © 2021 [employer]

## 2022-02-01 NOTE — PROGRESS NOTES
Chief Complaint   Patient presents with   • gastroparesis     6 month f/u    • Heartburn       Subjective    Ariana Martinez is a 62 y.o. female. she is here today for follow-up.    History of Present Illness  62-year-old female presents for recheck regarding GERD and  gastroparesis.  Reports she has done very well, denies any severe symptoms as long as she takes her medication regularly.   Still has some intermittent upper abdominal pain denies any nausea or vomiting.  Symptoms are well controlled with Protonix daily and takes Reglan only.  She notes there is possible interaction with Abilify causing extra pyramidal symptoms but states nausea is some much better on Reglan she would like to continue to use it as needed.       The following portions of the patient's history were reviewed and updated as appropriate:   Past Medical History:   Diagnosis Date   • Angina, class IV ([facility])    • Anxiety    • Anxiety and depression    • Arthritis    • Benign paroxysmal positional vertigo    • Bladder disorder     has bladder stimulator   • Carpal tunnel syndrome    • CHF (congestive heart failure) ([facility])    • Chronic pain    • Coronary atherosclerosis     hx CABG 2005.  is followed by Dr Kwon   • Depression    • Diabetes mellitus ([facility])     Type 2, controlled   • Diabetic polyneuropathy ([facility])    • Disease of thyroid gland    • Elevated cholesterol    • Female stress incontinence    • Foot pain, left    • Full dentures    • Gastroparesis    • GERD (gastroesophageal reflux disease)    • Hyperlipidemia    • Hypertension    • Low back pain    • Malaise and fatigue    • Multiple joint pain    • Obesity     Refuses to be weighed   • Occlusion and stenosis of bilateral carotid arteries 6/18/2021   • Otalgia     Both   • Perforation of tympanic membrane     Left   • Postoperative wound infection    • Vitamin D deficiency    • Wears glasses     reading     Past Surgical History:   Procedure Laterality Date   • ABDOMINAL SURGERY      • AMPUTATION FOOT     • ANGIOPLASTY      coronary   • ANKLE ARTHROSCOPY Left 2/26/2021    Procedure: Left foot hardware removal, ankle arthroscopy, bone grafting , left foot exostectomy;  Surgeon: Ignacio Lord DPM;  Location: [facility] OR;  Service: Podiatry;  Laterality: Left;   • BREAST BIOPSY Right    • CARDIAC CATHETERIZATION     • CARDIAC CATHETERIZATION N/A 6/20/2017    Procedure: Right Heart Cath;  Surgeon: Can Kwon MD PhD;  Location: [facility] CATH INVASIVE LOCATION;  Service:    • CARDIAC CATHETERIZATION N/A 2/18/2020    Procedure: Left Heart Cath;  Surgeon: Catalina Cooper MD;  Location: [facility] CATH INVASIVE LOCATION;  Service: Cardiology;  Laterality: N/A;   • CARPAL TUNNEL RELEASE     • CHOLECYSTECTOMY     • COLONOSCOPY N/A 6/24/2020    Procedure: COLONOSCOPY;  Surgeon: Julián Maldonado MD;  Location: [facility] ENDOSCOPY;  Service: Gastroenterology;  Laterality: N/A;   • CORONARY ARTERY BYPASS GRAFT  2005   • ENDOSCOPY N/A 10/19/2018    Procedure: ESOPHAGOGASTRODUODENOSCOPY possible dilation;  Surgeon: Julián Maldonado MD;  Location: [facility] ENDOSCOPY;  Service: Gastroenterology   • ENDOSCOPY N/A 6/24/2020    Procedure: ESOPHAGOGASTRODUODENOSCOPY WED appt please;  Surgeon: Julián Maldonado MD;  Location: [facility] ENDOSCOPY;  Service: Gastroenterology;  Laterality: N/A;   • ENDOSCOPY AND COLONOSCOPY     • FOOT SURGERY      Toes   • FOOT SURGERY     • GASTRIC BANDING      Revision, laparoscopic   • HYSTERECTOMY     • INCISION AND DRAINAGE LEG Left 3/12/2021    Procedure: Left ankle arthroscopic irrigation and debridement, screw removal;  Surgeon: Ignacio Lord DPM;  Location: [facility] OR;  Service: Podiatry;  Laterality: Left;   • MOUTH SURGERY     • SALPINGO OOPHORECTOMY     • SHOULDER SURGERY     • SUBTALAR ARTHRODESIS Left 1/16/2019    Procedure: LEFT FOOT HARDWARE REMOVAL, FIFTH METATARSAL , OPEN REDUCTION INTERNAL FIXATION, CALCANEAL OSTEOTOMY;  Surgeon: Ignacio Lord DPM;   Location: [facility] OR;  Service: Podiatry   • SUBTALAR ARTHRODESIS Left 10/16/2019    Procedure: foot hardware removal, subtalar joint fusion  possible de/reattachment of achilles tendon        (c-arm);  Surgeon: Ignacio Lord DPM;  Location: [facility];  Service: Podiatry   • SUBTALAR ARTHRODESIS Left 2020    Procedure: subtalar, talonavicular joint arthrodesis.  Removal hardware.          (c-arm);  Surgeon: Ignacio Lord DPM;  Location: [facility] OR;  Service: Podiatry;  Laterality: Left;   • TRANSESOPHAGEAL ECHOCARDIOGRAM (LAMONTE)      With color flow     Family History   Problem Relation Age of Onset   • Diabetes Other    • Heart disease Other    • Hypertension Other    • Heart disease Mother    • Stroke Mother    • Hypertension Mother    • Diabetes Sister    • Heart disease Sister    • Hypertension Sister    • Heart disease Sister    • Diabetes Sister    • Hypertension Sister    • Diabetes Sister    • Diabetes Sister    • Diabetes Sister    • Diabetes Sister      OB History        0    Para   0    Term   0       0    AB   0    Living   0       SAB   0    IAB   0    Ectopic   0    Molar        Multiple   0    Live Births                  Prior to Admission medications    Medication Sig Start Date End Date Taking? Authorizing Provider   Accu-Chek Guide test strip USE AS INSTRUCTED 22  Yes Kimberly Ferguson APRN   ARIPiprazole (ABILIFY) 15 MG tablet TAKE 1 TABLET BY MOUTH EVERY DAY 22  Yes Kimberly Ferguson APRN   aspirin (aspirin) 81 MG EC tablet Take 81 mg by mouth Daily.   Yes Provider, MD Esther   atorvastatin (LIPITOR) 80 MG tablet TAKE 1 TABLET BY MOUTH EVERY DAY 21  Yes Kimberly Ferguson APRN   BD SHARPS CONTAINER HOME misc 1 each Take As Directed. 18  Yes Francisca Fong MD   Blood Glucose Monitoring Suppl (ONE TOUCH ULTRA MINI) w/Device kit Patient will need the Accu-Check Avitio meter 10/18/21  Yes Kimberly Ferguson APRN   Calcium  Citrate-Vitamin D (CITRACAL/VITAMIN D) 250-200 MG-UNIT tablet Take 2 tablets by mouth 2 (two) times a day.   Yes Provider, MD Esther   clopidogrel (PLAVIX) 75 MG tablet TAKE 1 TABLET BY MOUTH EVERY DAY 12/2/21  Yes Kimberly Ferguson APRN   cyanocobalamin 1000 MCG/ML injection INJECT 1 ML INTO THE APPROPRIATE MUSCLE AS DIRECTED BY PRESCRIBER EVERY 28 (TWENTY-EIGHT) DAYS. 10/5/21  Yes Kimberly Ferguson APRN   folic acid (FOLVITE) 1 MG tablet Take 1 tablet by mouth Daily. 11/30/21  Yes Teressa Hammond APRN   furosemide (LASIX) 40 MG tablet TAKE 1 TABLET BY MOUTH EVERY DAY 1/13/22  Yes Kimberly Ferguson APRN   gabapentin (NEURONTIN) 400 MG capsule Take 1 capsule by mouth 3 (Three) Times a Day. 12/2/21  Yes Kimberly Ferguson APRN   GLUCAGON EMERGENCY 1 MG injection USE AS DIRECTED AS NEEDED 7/28/17  Yes Elvis Gamboa APRN   glucose monitor monitoring kit 1 each Daily. accucheck eve meter, E11.9 6/11/20  Yes Elvis Gamboa APRN   hydroCHLOROthiazide (HYDRODIURIL) 25 MG tablet TAKE 1 TABLET BY MOUTH EVERY DAY 1/13/22  Yes Kimberly Ferguson APRN   HYDROcodone-acetaminophen (NORCO) 7.5-325 MG per tablet Take 1 tablet by mouth Every 6 (Six) Hours As Needed for Moderate Pain . 1/21/22  Yes Kimberly Ferguson APRN   insulin aspart (NovoLOG FlexPen) 100 UNIT/ML solution pen-injector sc pen Inject 0 to 14 units under the skin into the appropriate area 3 (three) times daily before meals according to sliding scale on attached sheet. 12/29/21  Yes Kimberly Ferguson APRN   Insulin Glargine (BASAGLAR KWIKPEN) 100 UNIT/ML injection pen Inject 60 Units under the skin into the appropriate area as directed 2 (Two) Times a Day. 9/27/21  Yes Elvis Gamboa APRN   Insulin Pen Needle (B-D ULTRAFINE III SHORT PEN) 31G X 8 MM misc USE TO INJECT 5 TIMES A DAY 8/26/21  Yes Elvis Gamboa APRN   Insulin Pen Needle 31G X 8 MM misc Use up to 4 (four) times daily with insulin as directed.  "7/12/21  Yes Brandon Martel MD   Lancets (accu-chek soft touch) lancets As directed 10/18/21  Yes Kimberly Ferguson APRN   lisinopril (PRINIVIL,ZESTRIL) 20 MG tablet TAKE 1 TABLET BY MOUTH EVERY DAY 11/29/21  Yes Kimberly Ferguson APRN   LORazepam (ATIVAN) 0.5 MG tablet Take 1 tablet by mouth Every 8 (Eight) Hours. 12/2/21  Yes Kimberly Ferguson APRN   meclizine (ANTIVERT) 25 MG tablet Take 1 tablet by mouth 3 (Three) Times a Day As Needed for dizziness. 12/14/17  Yes Evon Hernandez APRN   methocarbamol (ROBAXIN) 500 MG tablet TAKE 1 TABLET BY MOUTH 2 (TWO) TIMES A DAY AS NEEDED FOR MUSCLE SPASMS. 12/16/21  Yes Kimberly Ferguson APRN   metoclopramide (REGLAN) 10 MG tablet TAKE 1 TABLET BY MOUTH 4 (FOUR) TIMES A DAY AS NEEDED (NAUSEA). 12/16/21  Yes Marcela Lawson APRN   metoprolol succinate XL (TOPROL-XL) 50 MG 24 hr tablet TAKE 1 TABLET BY MOUTH DAILY. DOSE INCREASED 5/24/21 12/16/21  Yes Kimberly Ferguson APRN   mirtazapine (REMERON) 30 MG tablet TAKE 1 TABLET BY MOUTH EVERY DAY AT NIGHT 12/16/21  Yes Kimberly Ferguson APRN   naloxone (NARCAN) 0.4 MG/ML injection Infuse 0.5 mL into a venous catheter Every 5 (Five) Minutes As Needed for Opioid Reversal. 3/13/21  Yes Nick Faye MD   nitroglycerin (NITROSTAT) 0.4 MG SL tablet Place 1 tablet under the tongue Every 5 (Five) Minutes As Needed for Chest Pain. Take no more than 3 doses in 15 minutes. 4/29/21  Yes Kimberly Ferguson APRN   ofloxacin (FLOXIN) 0.3 % otic solution Administer 5 drops to the right ear 2 (Two) Times a Day. 9/9/21  Yes Kimberly Ferguson APRN   ondansetron (ZOFRAN) 8 MG tablet TAKE 1 TABLET BY MOUTH EVERY 8 (EIGHT) HOURS AS NEEDED FOR NAUSEA OR VOMITING. 12/17/21  Yes Ferguson, BEBE Luu   pantoprazole (PROTONIX) 20 MG EC tablet Take 1 tablet by mouth Daily. 7/19/21  Yes Marcela Lawson APRN   Syringe/Needle, Disp, (Luer Lock Safety Syringes) 25G X 5/8\" 3 ML misc 1 each Daily. 1 Q28 DAYS 12/13/21  Yes Ferguson, " "BEBE Luu   tamsulosin (FLOMAX) 0.4 MG capsule 24 hr capsule Take 1 capsule by mouth Daily. 7/12/21  Yes Brandon Martel MD   venlafaxine XR (EFFEXOR-XR) 150 MG 24 hr capsule TAKE 1 CAPSULE BY MOUTH TWICE A DAY 11/19/21  Yes Ferguson, BEBE Luu   vitamin D (ERGOCALCIFEROL) 1.25 MG (72698 UT) capsule capsule TAKE ONE CAPSULE BY MOUTH EVERY SUNDAY. 12/16/21  Yes Ferguson, BEBE Luu     Allergies   Allergen Reactions   • Seroquel [Quetiapine] Anaphylaxis   • Avandia [Rosiglitazone] Swelling   • Morphine And Related Hallucinations   • Oxycodone Hallucinations     Social History     Socioeconomic History   • Marital status:    Tobacco Use   • Smoking status: Never Smoker   • Smokeless tobacco: Never Used   Vaping Use   • Vaping Use: Never used   Substance and Sexual Activity   • Alcohol use: No   • Drug use: No   • Sexual activity: Defer       Review of Systems  Review of Systems   Constitutional: Positive for fatigue. Negative for activity change, appetite change, chills, diaphoresis, fever and unexpected weight change.   HENT: Negative for sore throat and trouble swallowing.    Respiratory: Negative for shortness of breath.    Gastrointestinal: Positive for abdominal pain (intermittent upper abd pain). Negative for abdominal distention, anal bleeding, blood in stool, constipation, diarrhea, nausea, rectal pain and vomiting.   Musculoskeletal: Negative for arthralgias.   Skin: Negative for pallor.   Neurological: Negative for light-headedness.        /54 (BP Location: Right arm)   Pulse 64   Ht 172.7 cm (68\")   BMI 34.82 kg/m²     Objective    Physical Exam  Constitutional:       General: She is not in acute distress.     Appearance: Normal appearance. She is normal weight. She is not ill-appearing.   HENT:      Head: Normocephalic and atraumatic.   Pulmonary:      Effort: Pulmonary effort is normal.   Abdominal:      General: Bowel sounds are normal. There is no distension.      " Palpations: Abdomen is soft. There is no mass.      Tenderness: There is abdominal tenderness in the epigastric area and left upper quadrant.   Neurological:      Mental Status: She is alert.       Office Visit on 12/02/2021   Component Date Value Ref Range Status   • Report Summary 12/02/2021 FINAL   Final    Comment: ====================================================================  [facility] SELECT 13 IGNACIO-DIS  ====================================================================  Test                             Result       Flag       Units  Drug Present    Lorazepam                      194                     ng/mg creat     Source of lorazepam is a scheduled prescription medication.    Alcohol, Ethyl                 0.260                   g/dL     Sources of ethyl alcohol include alcoholic beverages or as a     fermentation product of glucose; glucose is present in this     specimen.  Interpret result with caution, as the presence of     ethyl alcohol is likely due, at least in part, to fermentation of     glucose.    Hydrocodone                    933                     ng/mg creat    Hydromorphone                  161                     ng/mg creat    Dihydrocodeine                 192                     ng/mg creat    Norhydrocodone                 1145                    ng/mg                            creat     Sources of hydrocodone include scheduled prescription     medications. Hydromorphone, dihydrocodeine and norhydrocodone are     expected metabolites of hydrocodone. Hydromorphone and     dihydrocodeine are also available as scheduled prescription     medications.  ====================================================================  Test                      Result    Flag   Units      Ref Range    Creatinine              84               mg/dL      >=20  ====================================================================  Declared Medications:   Medication list was not  provided.  ====================================================================  For clinical consultation, please call [phone].  ====================================================================   • Ethanol Screen Urine (Ref) 12/02/2021 +POSITIVE+   Final   • Ethanol, Urine 12/02/2021 0.260  g/dL Final   • Amphetamine, Urine Qual 12/02/2021 Negative   Final   • Methamphetamine, Urine 12/02/2021 Not Detected  ng/mg creat Final   • Amphetamine, Urine 12/02/2021 Not Detected  ng/mg creat Final   • MDMA URINE 12/02/2021 Not Detected  ng/mg creat Final   • MDA 12/02/2021 Not Detected  ng/mg creat Final   • Benzodiazepine Screen, Urine 12/02/2021 +POSITIVE+   Final   • DIAZEPAM URINE QUANT (REF) 12/02/2021 Not Detected  ng/mg creat Final   • Desmethyldiazepam 12/02/2021 Not Detected  ng/mg creat Final   • Oxazepam, urine 12/02/2021 Not Detected  ng/mg creat Final   • Temazepam, urine 12/02/2021 Not Detected  ng/mg creat Final    Expected metabolism of benzodiazepine class drugs:   Parent Drug       Detected Metabolites   -----------       --------------------   Diazepam:         Desmethyldiazepam, Temazepam, Oxazepam   Chlordiazepoxide: Desmethyldiazepam, Oxazepam   Clorazepate:      Desmethyldiazepam, Oxazepam   Halazepam:        Desmethyldiazepam, Oxazepam   Temazepam:        Oxazepam   Oxazepam:         None   • ALPRAZOLAM 12/02/2021 Not Detected  ng/mg creat Final   • ALPHA-HYDROXYALPRAZOLAM UR, QT (RE* 12/02/2021 Not Detected  ng/mg creat Final   • DESALKLFLURAZEPAM UR QUANT (REF) 12/02/2021 Not Detected  ng/mg creat Final   • LORAZEPAM URINE QUANT (REF) 12/02/2021 194  ng/mg creat Final   • Alpha-hydroxytriazolam, Urine 12/02/2021 Not Detected  ng/mg creat Final   • Clonazepam Urine 12/02/2021 Not Detected  ng/mg creat Final   • 7-Aminoclonazepam Urine 12/02/2021 Not Detected  ng/mg creat Final   • MIDAZOLAM UR, QUANT (REF) 12/02/2021 Not Detected  ng/mg creat Final   • Alpha-hydroxymidazolam, Urine  12/02/2021 Not Detected  ng/mg creat Final   • Flunitrazepam 12/02/2021 Not Detected  ng/mg creat Final   • DESMETHYLFLUNITRAZEPAM 12/02/2021 Not Detected  ng/mg creat Final   • Cocaine & Metabolite 12/02/2021 Negative   Final   • Cocaine Screen, Urine 12/02/2021 Not Detected  ng/mg creat Final   • Benzoylecgonine, Urine 12/02/2021 Not Detected  ng/mg creat Final   • Cocaethylene Ur 12/02/2021 Not Detected  ng/mg creat Final   • THC, Urine 12/02/2021 Negative   Final   • Carboxy THC, Urine 12/02/2021 Not Detected  ng/mg creat Final   • 6-ACETYLMORPHINE 12/02/2021 Negative   Final   • 6-acetylmorphine 12/02/2021 Not Detected  ng/mg creat Final   • Opiate Class 12/02/2021 +POSITIVE+   Final   • Codeine, Urine 12/02/2021 Not Detected  ng/mg creat Final   • Morphine, Urine 12/02/2021 Not Detected  ng/mg creat Final   • normorphine 12/02/2021 Not Detected  ng/mg creat Final   • Norcodeine Ur 12/02/2021 Not Detected  ng/mg creat Final   • Hydrocodone UR 12/02/2021 933  ng/mg creat Final   • Hydromorphone Urine 12/02/2021 161  ng/mg creat Final   • Dihydrocodeine, Urine 12/02/2021 192  ng/mg creat Final   • Opiates, Norhydrocodone, Urine 12/02/2021 1145  ng/mg creat Final    Expected metabolism of opiate class drugs:  Parent Drug       Detected Metabolites   -----------       --------------------   Codeine:          Major:  Morphine, Norcodeine                     Minor:  Hydrocodone, Hydromorphone,                             Dihydrocodeine, Norhydrocodone,                             Normorphine   Morphine:         Major:  Normorphine                     Minor:  Hydromorphone   Hydrocodone:      Hydromorphone, Dihydrocodeine,                     Norhydrocodone   Hydromorphone:    None   Dihydrocodeine:   None   Heroin:           6-Acetylmorphine (if included),                     Morphine, Normorphine                     Codeine, in small amounts in comparison                      to morphine, is often detected when                       heroin is the source drug.   • Oxycodone Class Ur 12/02/2021 Negative   Final   • Oxycodone, Confirmation, Urine 12/02/2021 Not Detected  ng/mg creat Final   • Oxymorphone UR 12/02/2021 Not Detected  ng/mg creat Final   • Opiates, Noroxycodone, Urine 12/02/2021 Not Detected  ng/mg creat Final   • Noroxymorphone 12/02/2021 Not Detected  ng/mg creat Final    Expected metabolism of oxycodone class drugs:   Parent Drug       Detected Metabolites   -----------       --------------------   Oxycodone:        Oxymorphone, Noroxycodone, Noroxymorphone   Oxymorphone:      Noroxymorphone   • Methadone 12/02/2021 Negative   Final   • Methadone, Quantitative 12/02/2021 Not Detected  ng/mg creat Final   • EDDP 12/02/2021 Not Detected  ng/mg creat Final   • Fentanyl and Analogues, Ur 12/02/2021 Negative   Final   • Fentanyl, Urine 12/02/2021 Not Detected  ng/mg creat Final   • Norfentanyl Urine 12/02/2021 Not Detected  ng/mg creat Final   • Sufentanil, Ur 12/02/2021 Not Detected  ng/mg creat Final   • Alfentanil, Ur 12/02/2021 Not Detected  ng/mg creat Final   • BUPRENORPHINE 12/02/2021 Negative   Final   • Buprenorphine, Urine 12/02/2021 Not Detected  ng/mg creat Final   • Norbuprenorphine 12/02/2021 Not Detected  ng/mg creat Final   • Tapentadol 12/02/2021 Negative   Final   • Tapentadol Ur 12/02/2021 Not Detected  ng/mg creat Final   • Opoidss other, UR 12/02/2021 Negative   Final   • Tramadol, Urine 12/02/2021 Not Detected  ng/mg creat Final   • O-desmethyltramadol, Ur 12/02/2021 Not Detected  ng/mg creat Final   • N-Desmethyltramadol, U 12/02/2021 Not Detected  ng/mg creat Final   • BARBITURATES, URINE 12/02/2021 Negative   Final   • Amobarbital, Urine 12/02/2021 Not Detected   Final   • Barbital 12/02/2021 Not Detected   Final   • Butabarbital, Ur 12/02/2021 Not Detected   Final   • Butalbital Screen, Urine 12/02/2021 Not Detected   Final   • Mephobarbital Urine 12/02/2021 Not Detected   Final   •  Pentobarbital UR 12/02/2021 Not Detected   Final   • Phenobarbital 12/02/2021 Not Detected   Final   • Secobarbital, urine 12/02/2021 Not Detected   Final   • Thiopental, Ur 12/02/2021 Not Detected   Final   • Creatinine, Urine 12/02/2021 84  mg/dL Final    REFERENCE RANGE: Ref Range>=20   • Level of Detection: 12/02/2021 Comment   Final    TESTING THRESHOLDS ARE AS FOLLOWS:  AMPHETAMINES: 50 ng/mL  BENZODIAZEPINES: 20 ng/mL  COCAINE / METABOLITE: 50 ng/mL  ALCOHOL, ETHYL: 0.020 gm/dL  CANNABINOIDS: total-20 ng/mL, carboxy-THC-2 ng/mL  6-ACETYLMORPHINE: 10 ng/mL  OPIATE CLASS: 50 ng/mL  OXYCODONE CLASS: 50 ng/mL  METHADONE: 50 ng/mL  BUPRENORPHINE: buprenorphine-1.0 ng/mL,                 norbuprenorphine-5 ng/mL  FENTANYL / ANALOGUES: fentanyl-1.0 ng/mL, others-5.0 ng/mL  TAPENTADOL: 50 ng/mL  TRAMADOL: 50 ng/mL  BARBITURATES: 200 ng/mL  This test was developed and its performance characteristics  determined by [employer].  It has not been cleared or approved  by the Food and Drug Administration.   • Gabapentin, Ur 12/02/2021 148.9  ug/mL Final     Assessment/Plan      1. Gastroesophageal reflux disease with esophagitis without hemorrhage    2. Gastroparesis    .   Continue Protonix daily avoid gastric irritants follow standard antireflux measures.  Continue small frequent meals avoid overeating or laying down immediately after eating.  Continue Reglan as needed    Orders placed during this encounter include:  No orders of the defined types were placed in this encounter.      * Surgery not found *    Review and/or summary of lab tests, radiology, procedures, medications. Review and summary of old records and obtaining of history. The risks and benefits of my recommendations, as well as other treatment options were discussed with the patient today. Questions were answered.    No orders of the defined types were placed in this encounter.      Follow-up: Return in about 6 months (around 8/1/2022) for  Recheck.          This document has been electronically signed by BEBE Leach on February 1, 2022 11:25 CST           I spent 19 minutes caring for Ariana on this date of service. This time includes time spent by me in the following activities:preparing for the visit, reviewing tests, obtaining and/or reviewing a separately obtained history, performing a medically appropriate examination and/or evaluation , counseling and educating the patient/family/caregiver, ordering medications, tests, or procedures, referring and communicating with other health care professionals , documenting information in the medical record and care coordination    Results for orders placed or performed in visit on 12/02/21   [facility] Select 13 Discrete -    Specimen: Urine   Result Value Ref Range    Report Summary FINAL     Ethanol Screen Urine (Ref) +POSITIVE+     Ethanol, Urine 0.260 g/dL    Amphetamine, Urine Qual Negative     Methamphetamine, Urine Not Detected ng/mg creat    Amphetamine, Urine Not Detected ng/mg creat    MDMA URINE Not Detected ng/mg creat    MDA Not Detected ng/mg creat    Benzodiazepine Screen, Urine +POSITIVE+     DIAZEPAM URINE QUANT (REF) Not Detected ng/mg creat    Desmethyldiazepam Not Detected ng/mg creat    Oxazepam, urine Not Detected ng/mg creat    Temazepam, urine Not Detected ng/mg creat    ALPRAZOLAM Not Detected ng/mg creat    ALPHA-HYDROXYALPRAZOLAM UR, QT (REF) Not Detected ng/mg creat    DESALKLFLURAZEPAM UR QUANT (REF) Not Detected ng/mg creat    LORAZEPAM URINE QUANT (REF) 194 ng/mg creat    Alpha-hydroxytriazolam, Urine Not Detected ng/mg creat    Clonazepam Urine Not Detected ng/mg creat    7-Aminoclonazepam Urine Not Detected ng/mg creat    MIDAZOLAM UR, QUANT (REF) Not Detected ng/mg creat    Alpha-hydroxymidazolam, Urine Not Detected ng/mg creat    Flunitrazepam Not Detected ng/mg creat    DESMETHYLFLUNITRAZEPAM Not Detected ng/mg creat    Cocaine & Metabolite Negative     Cocaine  Screen, Urine Not Detected ng/mg creat    Benzoylecgonine, Urine Not Detected ng/mg creat    Cocaethylene Ur Not Detected ng/mg creat    THC, Urine Negative     Carboxy THC, Urine Not Detected ng/mg creat    6-ACETYLMORPHINE Negative     6-acetylmorphine Not Detected ng/mg creat    Opiate Class +POSITIVE+     Codeine, Urine Not Detected ng/mg creat    Morphine, Urine Not Detected ng/mg creat    normorphine Not Detected ng/mg creat    Norcodeine Ur Not Detected ng/mg creat    Hydrocodone  ng/mg creat    Hydromorphone Urine 161 ng/mg creat    Dihydrocodeine, Urine 192 ng/mg creat    Opiates, Norhydrocodone, Urine 1145 ng/mg creat    Oxycodone Class Ur Negative     Oxycodone, Confirmation, Urine Not Detected ng/mg creat    Oxymorphone UR Not Detected ng/mg creat    Opiates, Noroxycodone, Urine Not Detected ng/mg creat    Noroxymorphone Not Detected ng/mg creat    Methadone Negative     Methadone, Quantitative Not Detected ng/mg creat    EDDP Not Detected ng/mg creat    Fentanyl and Analogues, Ur Negative     Fentanyl, Urine Not Detected ng/mg creat    Norfentanyl Urine Not Detected ng/mg creat    Sufentanil, Ur Not Detected ng/mg creat    Alfentanil, Ur Not Detected ng/mg creat    BUPRENORPHINE Negative     Buprenorphine, Urine Not Detected ng/mg creat    Norbuprenorphine Not Detected ng/mg creat    Tapentadol Negative     Tapentadol Ur Not Detected ng/mg creat    Opoidss other, UR Negative     Tramadol, Urine Not Detected ng/mg creat    O-desmethyltramadol, Ur Not Detected ng/mg creat    N-Desmethyltramadol, U Not Detected ng/mg creat    BARBITURATES, URINE Negative     Amobarbital, Urine Not Detected     Barbital Not Detected     Butabarbital, Ur Not Detected     Butalbital Screen, Urine Not Detected     Mephobarbital Urine Not Detected     Pentobarbital UR Not Detected     Phenobarbital Not Detected     Secobarbital, urine Not Detected     Thiopental, Ur Not Detected     Creatinine, Urine 84 mg/dL    Level of  Detection: Comment    Gabapentin, Urine -    Specimen: Urine   Result Value Ref Range    Gabapentin, Ur 148.9 ug/mL   Results for orders placed or performed in visit on 11/30/21   Vitamin B12 & Folate    Specimen: Blood   Result Value Ref Range    Folate >20.00 4.78 - 24.20 ng/mL    Vitamin B-12 >2,000 (H) 211 - 946 pg/mL   CBC Auto Differential    Specimen: Blood   Result Value Ref Range    WBC 12.69 (H) 3.40 - 10.80 10*3/mm3    RBC 4.39 3.77 - 5.28 10*6/mm3    Hemoglobin 13.0 12.0 - 15.9 g/dL    Hematocrit 38.7 34.0 - 46.6 %    MCV 88.2 79.0 - 97.0 fL    MCH 29.6 26.6 - 33.0 pg    MCHC 33.6 31.5 - 35.7 g/dL    RDW 12.9 12.3 - 15.4 %    RDW-SD 41.7 37.0 - 54.0 fl    MPV 9.4 6.0 - 12.0 fL    Platelets 447 140 - 450 10*3/mm3    Neutrophil % 63.0 42.7 - 76.0 %    Lymphocyte % 27.4 19.6 - 45.3 %    Monocyte % 5.3 5.0 - 12.0 %    Eosinophil % 3.2 0.3 - 6.2 %    Basophil % 0.6 0.0 - 1.5 %    Immature Grans % 0.5 0.0 - 0.5 %    Neutrophils, Absolute 8.01 (H) 1.70 - 7.00 10*3/mm3    Lymphocytes, Absolute 3.48 (H) 0.70 - 3.10 10*3/mm3    Monocytes, Absolute 0.67 0.10 - 0.90 10*3/mm3    Eosinophils, Absolute 0.40 0.00 - 0.40 10*3/mm3    Basophils, Absolute 0.07 0.00 - 0.20 10*3/mm3    Immature Grans, Absolute 0.06 (H) 0.00 - 0.05 10*3/mm3    nRBC 0.0 0.0 - 0.2 /100 WBC     *Note: Due to a large number of results and/or encounters for the requested time period, some results have not been displayed. A complete set of results can be found in Results Review.

## 2022-02-02 DIAGNOSIS — F41.1 GENERALIZED ANXIETY DISORDER: ICD-10-CM

## 2022-02-03 ENCOUNTER — TELEPHONE (OUTPATIENT)
Dept: FAMILY MEDICINE CLINIC | Facility: CLINIC | Age: 62
End: 2022-02-03

## 2022-02-03 RX ORDER — LORAZEPAM 0.5 MG/1
TABLET ORAL
Qty: 90 TABLET | Refills: 0 | Status: SHIPPED | OUTPATIENT
Start: 2022-02-03 | End: 2022-02-28 | Stop reason: SDUPTHER

## 2022-02-03 NOTE — TELEPHONE ENCOUNTER
Patient seen in office every 3 months for chronic anxiety requiring benzodiazepine anxiolytic. Compliant with medication, visits with no adverse effects noted. LESTER and UDS current and appropriate. Patient called requesting scheduled refill at appropriate interval. Patient understands the risks associated with this controlled medication, including tolerance and addiction.  Patient also agrees to only obtain this medication from me, and not from a another provider, unless that provider is covering for me in my absence.  Patient also agrees to be compliant in dosing, and not self adjust the dose of medication.  A signed controlled substance agreement is on file, and the patient has received a controlled substance education sheet at this a previous visit.  The patient has also signed a consent for treatment with a controlled substance as per [facility] policy. LESTER was obtained. Refill sent for  Lorazepam.     This document has been electronically signed by BEBE Palomino on February 3, 2022 17:21 CST

## 2022-02-05 ENCOUNTER — APPOINTMENT (OUTPATIENT)
Dept: CT IMAGING | Facility: HOSPITAL | Age: 62
End: 2022-02-05

## 2022-02-05 ENCOUNTER — HOSPITAL ENCOUNTER (OUTPATIENT)
Facility: HOSPITAL | Age: 62
Setting detail: OBSERVATION
Discharge: HOME | End: 2022-02-07
Attending: EMERGENCY MEDICINE | Admitting: HOSPITALIST

## 2022-02-05 ENCOUNTER — APPOINTMENT (OUTPATIENT)
Dept: GENERAL RADIOLOGY | Facility: HOSPITAL | Age: 62
End: 2022-02-05

## 2022-02-05 DIAGNOSIS — H53.9 VISUAL CHANGES: ICD-10-CM

## 2022-02-05 DIAGNOSIS — G45.9 TIA (TRANSIENT ISCHEMIC ATTACK): Primary | ICD-10-CM

## 2022-02-05 LAB
ABO GROUP BLD: NORMAL
ABO GROUP BLD: NORMAL
ALBUMIN SERPL-MCNC: 4.1 G/DL (ref 3.5–5.2)
ALBUMIN/GLOB SERPL: 1.5 G/DL
ALP SERPL-CCNC: 132 U/L (ref 39–117)
ALT SERPL W P-5'-P-CCNC: 15 U/L (ref 1–33)
ANION GAP SERPL CALCULATED.3IONS-SCNC: 9 MMOL/L (ref 5–15)
AST SERPL-CCNC: 12 U/L (ref 1–32)
BASOPHILS # BLD AUTO: 0.06 10*3/MM3 (ref 0–0.2)
BASOPHILS NFR BLD AUTO: 0.5 % (ref 0–1.5)
BILIRUB SERPL-MCNC: 0.2 MG/DL (ref 0–1.2)
BLD GP AB SCN SERPL QL: NEGATIVE
BUN SERPL-MCNC: 21 MG/DL (ref 8–23)
BUN/CREAT SERPL: 17.5 (ref 7–25)
CALCIUM SPEC-SCNC: 9.9 MG/DL (ref 8.6–10.5)
CHLORIDE SERPL-SCNC: 103 MMOL/L (ref 98–107)
CO2 SERPL-SCNC: 28 MMOL/L (ref 22–29)
CREAT SERPL-MCNC: 1.2 MG/DL (ref 0.57–1)
CRP SERPL-MCNC: <0.3 MG/DL (ref 0–0.5)
DEPRECATED RDW RBC AUTO: 40.6 FL (ref 37–54)
EOSINOPHIL # BLD AUTO: 0.47 10*3/MM3 (ref 0–0.4)
EOSINOPHIL NFR BLD AUTO: 3.6 % (ref 0.3–6.2)
ERYTHROCYTE [DISTWIDTH] IN BLOOD BY AUTOMATED COUNT: 13.1 % (ref 12.3–15.4)
ERYTHROCYTE [SEDIMENTATION RATE] IN BLOOD: 35 MM/HR (ref 0–20)
FLUAV RNA RESP QL NAA+PROBE: NOT DETECTED
FLUBV RNA RESP QL NAA+PROBE: NOT DETECTED
GFR SERPL CREATININE-BSD FRML MDRD: 46 ML/MIN/1.73
GLOBULIN UR ELPH-MCNC: 2.7 GM/DL
GLUCOSE BLDC GLUCOMTR-MCNC: 111 MG/DL (ref 70–130)
GLUCOSE BLDC GLUCOMTR-MCNC: 77 MG/DL (ref 70–130)
GLUCOSE SERPL-MCNC: 76 MG/DL (ref 65–99)
HCT VFR BLD AUTO: 35.7 % (ref 34–46.6)
HGB BLD-MCNC: 12 G/DL (ref 12–15.9)
HOLD SPECIMEN: NORMAL
IMM GRANULOCYTES # BLD AUTO: 0.06 10*3/MM3 (ref 0–0.05)
IMM GRANULOCYTES NFR BLD AUTO: 0.5 % (ref 0–0.5)
INR PPP: 0.95 (ref 0.8–1.2)
LYMPHOCYTES # BLD AUTO: 3.82 10*3/MM3 (ref 0.7–3.1)
LYMPHOCYTES NFR BLD AUTO: 29.3 % (ref 19.6–45.3)
Lab: NORMAL
MCH RBC QN AUTO: 29.1 PG (ref 26.6–33)
MCHC RBC AUTO-ENTMCNC: 33.6 G/DL (ref 31.5–35.7)
MCV RBC AUTO: 86.7 FL (ref 79–97)
MONOCYTES # BLD AUTO: 0.94 10*3/MM3 (ref 0.1–0.9)
MONOCYTES NFR BLD AUTO: 7.2 % (ref 5–12)
NEUTROPHILS NFR BLD AUTO: 58.9 % (ref 42.7–76)
NEUTROPHILS NFR BLD AUTO: 7.69 10*3/MM3 (ref 1.7–7)
NRBC BLD AUTO-RTO: 0 /100 WBC (ref 0–0.2)
PLATELET # BLD AUTO: 419 10*3/MM3 (ref 140–450)
PMV BLD AUTO: 9.5 FL (ref 6–12)
POTASSIUM SERPL-SCNC: 4.1 MMOL/L (ref 3.5–5.2)
PROT SERPL-MCNC: 6.8 G/DL (ref 6–8.5)
PROTHROMBIN TIME: 12.6 SECONDS (ref 11.1–15.3)
RBC # BLD AUTO: 4.12 10*6/MM3 (ref 3.77–5.28)
RH BLD: NEGATIVE
RH BLD: NEGATIVE
SARS-COV-2 RNA RESP QL NAA+PROBE: NOT DETECTED
SODIUM SERPL-SCNC: 140 MMOL/L (ref 136–145)
T&S EXPIRATION DATE: NORMAL
WBC NRBC COR # BLD: 13.04 10*3/MM3 (ref 3.4–10.8)
WHOLE BLOOD HOLD SPECIMEN: NORMAL
WHOLE BLOOD HOLD SPECIMEN: NORMAL

## 2022-02-05 PROCEDURE — 93005 ELECTROCARDIOGRAM TRACING: CPT | Performed by: NURSE PRACTITIONER

## 2022-02-05 PROCEDURE — G0378 HOSPITAL OBSERVATION PER HR: HCPCS

## 2022-02-05 PROCEDURE — 86850 RBC ANTIBODY SCREEN: CPT | Performed by: NURSE PRACTITIONER

## 2022-02-05 PROCEDURE — 70450 CT HEAD/BRAIN W/O DYE: CPT

## 2022-02-05 PROCEDURE — 86140 C-REACTIVE PROTEIN: CPT | Performed by: NURSE PRACTITIONER

## 2022-02-05 PROCEDURE — 70496 CT ANGIOGRAPHY HEAD: CPT

## 2022-02-05 PROCEDURE — 36415 COLL VENOUS BLD VENIPUNCTURE: CPT | Performed by: EMERGENCY MEDICINE

## 2022-02-05 PROCEDURE — 86901 BLOOD TYPING SEROLOGIC RH(D): CPT | Performed by: NURSE PRACTITIONER

## 2022-02-05 PROCEDURE — 0 IOPAMIDOL PER 1 ML: Performed by: EMERGENCY MEDICINE

## 2022-02-05 PROCEDURE — 87636 SARSCOV2 & INF A&B AMP PRB: CPT | Performed by: EMERGENCY MEDICINE

## 2022-02-05 PROCEDURE — 85025 COMPLETE CBC W/AUTO DIFF WBC: CPT | Performed by: NURSE PRACTITIONER

## 2022-02-05 PROCEDURE — 85610 PROTHROMBIN TIME: CPT | Performed by: NURSE PRACTITIONER

## 2022-02-05 PROCEDURE — 80053 COMPREHEN METABOLIC PANEL: CPT | Performed by: NURSE PRACTITIONER

## 2022-02-05 PROCEDURE — 63710000001 INSULIN DETEMIR PER 5 UNITS: Performed by: HOSPITALIST

## 2022-02-05 PROCEDURE — 86900 BLOOD TYPING SEROLOGIC ABO: CPT

## 2022-02-05 PROCEDURE — 85651 RBC SED RATE NONAUTOMATED: CPT | Performed by: NURSE PRACTITIONER

## 2022-02-05 PROCEDURE — 86901 BLOOD TYPING SEROLOGIC RH(D): CPT

## 2022-02-05 PROCEDURE — 99285 EMERGENCY DEPT VISIT HI MDM: CPT

## 2022-02-05 PROCEDURE — 71045 X-RAY EXAM CHEST 1 VIEW: CPT

## 2022-02-05 PROCEDURE — 93010 ELECTROCARDIOGRAM REPORT: CPT | Performed by: INTERNAL MEDICINE

## 2022-02-05 PROCEDURE — C9803 HOPD COVID-19 SPEC COLLECT: HCPCS

## 2022-02-05 PROCEDURE — 82962 GLUCOSE BLOOD TEST: CPT

## 2022-02-05 PROCEDURE — 70498 CT ANGIOGRAPHY NECK: CPT

## 2022-02-05 PROCEDURE — 86900 BLOOD TYPING SEROLOGIC ABO: CPT | Performed by: NURSE PRACTITIONER

## 2022-02-05 RX ORDER — VENLAFAXINE HYDROCHLORIDE 75 MG/1
150 CAPSULE, EXTENDED RELEASE ORAL 2 TIMES DAILY
Status: DISCONTINUED | OUTPATIENT
Start: 2022-02-05 | End: 2022-02-07 | Stop reason: HOSPADM

## 2022-02-05 RX ORDER — DEXTROSE MONOHYDRATE 25 G/50ML
25 INJECTION, SOLUTION INTRAVENOUS
Status: DISCONTINUED | OUTPATIENT
Start: 2022-02-05 | End: 2022-02-07 | Stop reason: HOSPADM

## 2022-02-05 RX ORDER — HYDROCODONE BITARTRATE AND ACETAMINOPHEN 5; 325 MG/1; MG/1
1 TABLET ORAL ONCE
Status: COMPLETED | OUTPATIENT
Start: 2022-02-05 | End: 2022-02-05

## 2022-02-05 RX ORDER — GABAPENTIN 400 MG/1
400 CAPSULE ORAL 3 TIMES DAILY
Status: DISCONTINUED | OUTPATIENT
Start: 2022-02-05 | End: 2022-02-07 | Stop reason: HOSPADM

## 2022-02-05 RX ORDER — SODIUM CHLORIDE 0.9 % (FLUSH) 0.9 %
10 SYRINGE (ML) INJECTION EVERY 12 HOURS SCHEDULED
Status: DISCONTINUED | OUTPATIENT
Start: 2022-02-05 | End: 2022-02-07 | Stop reason: HOSPADM

## 2022-02-05 RX ORDER — PANTOPRAZOLE SODIUM 20 MG/1
20 TABLET, DELAYED RELEASE ORAL DAILY
Status: DISCONTINUED | OUTPATIENT
Start: 2022-02-05 | End: 2022-02-07 | Stop reason: HOSPADM

## 2022-02-05 RX ORDER — MIRTAZAPINE 15 MG/1
30 TABLET, FILM COATED ORAL NIGHTLY
Status: DISCONTINUED | OUTPATIENT
Start: 2022-02-05 | End: 2022-02-07 | Stop reason: HOSPADM

## 2022-02-05 RX ORDER — NICOTINE POLACRILEX 4 MG
15 LOZENGE BUCCAL
Status: DISCONTINUED | OUTPATIENT
Start: 2022-02-05 | End: 2022-02-07 | Stop reason: HOSPADM

## 2022-02-05 RX ORDER — FOLIC ACID 1 MG/1
1 TABLET ORAL DAILY
Status: DISCONTINUED | OUTPATIENT
Start: 2022-02-05 | End: 2022-02-07 | Stop reason: HOSPADM

## 2022-02-05 RX ORDER — ASPIRIN 325 MG
325 TABLET, DELAYED RELEASE (ENTERIC COATED) ORAL DAILY
Status: DISCONTINUED | OUTPATIENT
Start: 2022-02-05 | End: 2022-02-07 | Stop reason: HOSPADM

## 2022-02-05 RX ORDER — ATORVASTATIN CALCIUM 40 MG/1
80 TABLET, FILM COATED ORAL DAILY
Status: DISCONTINUED | OUTPATIENT
Start: 2022-02-05 | End: 2022-02-07 | Stop reason: HOSPADM

## 2022-02-05 RX ORDER — SODIUM CHLORIDE 0.9 % (FLUSH) 0.9 %
10 SYRINGE (ML) INJECTION AS NEEDED
Status: DISCONTINUED | OUTPATIENT
Start: 2022-02-05 | End: 2022-02-07 | Stop reason: HOSPADM

## 2022-02-05 RX ORDER — ONDANSETRON 4 MG/1
8 TABLET, FILM COATED ORAL EVERY 8 HOURS PRN
Status: DISCONTINUED | OUTPATIENT
Start: 2022-02-05 | End: 2022-02-07 | Stop reason: HOSPADM

## 2022-02-05 RX ORDER — CLOPIDOGREL BISULFATE 75 MG/1
75 TABLET ORAL DAILY
Status: DISCONTINUED | OUTPATIENT
Start: 2022-02-05 | End: 2022-02-07 | Stop reason: HOSPADM

## 2022-02-05 RX ORDER — ARIPIPRAZOLE 15 MG/1
15 TABLET ORAL DAILY
Status: DISCONTINUED | OUTPATIENT
Start: 2022-02-05 | End: 2022-02-06

## 2022-02-05 RX ORDER — METOCLOPRAMIDE 10 MG/1
10 TABLET ORAL 4 TIMES DAILY PRN
Status: DISCONTINUED | OUTPATIENT
Start: 2022-02-05 | End: 2022-02-07 | Stop reason: HOSPADM

## 2022-02-05 RX ORDER — MECLIZINE HYDROCHLORIDE 25 MG/1
25 TABLET ORAL 3 TIMES DAILY PRN
Status: DISCONTINUED | OUTPATIENT
Start: 2022-02-05 | End: 2022-02-07 | Stop reason: HOSPADM

## 2022-02-05 RX ADMIN — FOLIC ACID 1 MG: 1 TABLET ORAL at 18:42

## 2022-02-05 RX ADMIN — VENLAFAXINE HYDROCHLORIDE 150 MG: 75 CAPSULE, EXTENDED RELEASE ORAL at 20:33

## 2022-02-05 RX ADMIN — ASPIRIN 325 MG: 325 TABLET, COATED ORAL at 18:34

## 2022-02-05 RX ADMIN — ARIPIPRAZOLE 15 MG: 15 TABLET ORAL at 20:34

## 2022-02-05 RX ADMIN — INSULIN DETEMIR 40 UNITS: 100 INJECTION, SOLUTION SUBCUTANEOUS at 20:46

## 2022-02-05 RX ADMIN — GABAPENTIN 400 MG: 400 CAPSULE ORAL at 20:07

## 2022-02-05 RX ADMIN — HYDROCODONE BITARTRATE AND ACETAMINOPHEN 1 TABLET: 5; 325 TABLET ORAL at 14:25

## 2022-02-05 RX ADMIN — MIRTAZAPINE 30 MG: 15 TABLET, FILM COATED ORAL at 20:07

## 2022-02-05 RX ADMIN — IOPAMIDOL 90 ML: 755 INJECTION, SOLUTION INTRAVENOUS at 11:46

## 2022-02-05 RX ADMIN — ATORVASTATIN CALCIUM 80 MG: 40 TABLET, FILM COATED ORAL at 18:33

## 2022-02-05 RX ADMIN — SODIUM CHLORIDE 500 ML: 9 INJECTION, SOLUTION INTRAVENOUS at 12:59

## 2022-02-05 RX ADMIN — PANTOPRAZOLE 20 MG: 20 TABLET, DELAYED RELEASE ORAL at 20:34

## 2022-02-05 RX ADMIN — CLOPIDOGREL BISULFATE 75 MG: 75 TABLET, FILM COATED ORAL at 18:34

## 2022-02-05 NOTE — ED NOTES
Patient is border status. No sign and helds to be released at this time. Changed to lab collect.     Kimberly Hunt RN  02/05/22 0205

## 2022-02-05 NOTE — ED NOTES
Patient c/o blurred vision in left eye since 2200 last night.  can see some out of her left eye, but it is blurry and she is seeing floaters.  has also been having headaches off and on for a week, but does not have one right now.      Kimberly Hunt, RN  02/05/22 [id number]

## 2022-02-05 NOTE — ED NOTES
Neurology consult in process by Dr. Mohsen via neuro-computer.     Kimberly Hunt RN  02/05/22 3005

## 2022-02-05 NOTE — H&P
[facility] Medicine Admission      Date of Admission: 2/5/2022      Primary Care Physician: Kimberly Ferguson APRN      Chief Complaint: Left eye loss of vision    HPI: Patient is a 62-year-old female with past medical history of coronary artery disease, diabetes, diabetic neuropathy, hypertension, anxiety and hypothyroidism who developed vision changes in her left eye on the day prior to admission.  She states that she is still able to see shapes and light, which she has markedly decreased vision.  She states she is never had a stroke in the past.  Otherwise she has no complaints.  There are no alleviating or exacerbating factors.    Concurrent Medical History:  has a past medical history of Angina, class IV ([facility]), Anxiety, Anxiety and depression, Arthritis, Benign paroxysmal positional vertigo, Bladder disorder, Carpal tunnel syndrome, CHF (congestive heart failure) ([facility]), Chronic pain, Coronary atherosclerosis, Depression, Diabetes mellitus ([facility]), Diabetic polyneuropathy ([facility]), Disease of thyroid gland, Elevated cholesterol, Female stress incontinence, Foot pain, left, Full dentures, Gastroparesis, GERD (gastroesophageal reflux disease), Hyperlipidemia, Hypertension, Low back pain, Malaise and fatigue, Multiple joint pain, Obesity, Occlusion and stenosis of bilateral carotid arteries (6/18/2021), Otalgia, Perforation of tympanic membrane, Postoperative wound infection, Vitamin D deficiency, and Wears glasses.    Past Surgical History:  has a past surgical history that includes Abdominal surgery; Angioplasty; Coronary artery bypass graft (2005); Cardiac catheterization; Carpal tunnel release; Cholecystectomy; endoscopy and colonoscopy; Mouth surgery; transesophageal echocardiogram ([place]); Foot surgery; gastric banding; Hysterectomy; Shoulder surgery; Salpingoophorectomy; Cardiac catheterization (N/A, 6/20/2017); Breast biopsy (Right);  Esophagogastroduodenoscopy (N/A, 10/19/2018); subtalar arthrodesis (Left, 1/16/2019); subtalar arthrodesis (Left, 10/16/2019); Foot surgery; Cardiac catheterization (N/A, 2/18/2020); Esophagogastroduodenoscopy (N/A, 6/24/2020); Colonoscopy (N/A, 6/24/2020); subtalar arthrodesis (Left, 9/30/2020); Ankle Arthroscopy (Left, 2/26/2021); incision and drainage leg (Left, 3/12/2021); and Foot Amputation.    Family History: family history includes Diabetes in her sister, sister, sister, sister, sister, sister and another family member; Heart disease in her mother, sister, sister, and another family member; Hypertension in her mother, sister, sister, and another family member; Stroke in her mother.     Social History:  reports that she has never smoked. She has never used smokeless tobacco. She reports that she does not drink alcohol and does not use drugs.    Allergies:   Allergies   Allergen Reactions   • Seroquel [Quetiapine] Anaphylaxis   • Avandia [Rosiglitazone] Swelling   • Morphine And Related Hallucinations   • Oxycodone Hallucinations       Medications:   Prior to Admission medications    Medication Sig Start Date End Date Taking? Authorizing Provider   Accu-Chek Guide test strip USE AS INSTRUCTED 1/24/22  Yes Ferguson, BEBE Luu   ARIPiprazole (ABILIFY) 15 MG tablet TAKE 1 TABLET BY MOUTH EVERY DAY 1/24/22  Yes Ferguson, BEBE Luu   aspirin (aspirin) 81 MG EC tablet Take 81 mg by mouth Daily.   Yes Provider, MD Esther   atorvastatin (LIPITOR) 80 MG tablet TAKE 1 TABLET BY MOUTH EVERY DAY 12/6/21  Yes Ferguson, BEBE Luu   BD SHARPS CONTAINER HOME misc 1 each Take As Directed. 9/7/18  Yes Francisca Fong MD   Blood Glucose Monitoring Suppl (ONE TOUCH ULTRA MINI) w/Device kit Patient will need the Accu-Check Avitio meter 10/18/21  Yes Kimberly Ferguson APRN   Calcium Citrate-Vitamin D (CITRACAL/VITAMIN D) 250-200 MG-UNIT tablet Take 2 tablets by mouth 2 (two) times a day.   Yes Provider,  MD Esther   clopidogrel (PLAVIX) 75 MG tablet TAKE 1 TABLET BY MOUTH EVERY DAY 12/2/21  Yes Kimberly Ferguson APRN   cyanocobalamin 1000 MCG/ML injection INJECT 1 ML INTO THE APPROPRIATE MUSCLE AS DIRECTED BY PRESCRIBER EVERY 28 (TWENTY-EIGHT) DAYS. 10/5/21  Yes Kimberly Ferguson APRN   folic acid (FOLVITE) 1 MG tablet Take 1 tablet by mouth Daily. 11/30/21  Yes Teressa Hammond APRN   furosemide (LASIX) 40 MG tablet TAKE 1 TABLET BY MOUTH EVERY DAY 1/13/22  Yes Kimberly Ferguson APRN   gabapentin (NEURONTIN) 400 MG capsule Take 1 capsule by mouth 3 (Three) Times a Day. 12/2/21  Yes Kimberly Ferguson APRN   GLUCAGON EMERGENCY 1 MG injection USE AS DIRECTED AS NEEDED 7/28/17  Yes Elvis Gamboa APRN   glucose monitor monitoring kit 1 each Daily. accucheck eve meter, E11.9 6/11/20  Yes Elvis Gamboa APRN   hydroCHLOROthiazide (HYDRODIURIL) 25 MG tablet TAKE 1 TABLET BY MOUTH EVERY DAY 1/13/22  Yes Kimberly Ferguson APRN   HYDROcodone-acetaminophen (NORCO) 7.5-325 MG per tablet Take 1 tablet by mouth Every 6 (Six) Hours As Needed for Moderate Pain . 1/21/22  Yes Kimberly Ferguson APRN   insulin aspart (NovoLOG FlexPen) 100 UNIT/ML solution pen-injector sc pen Inject 0 to 14 units under the skin into the appropriate area 3 (three) times daily before meals according to sliding scale on attached sheet. 12/29/21  Yes Kimberly Ferguson APRN   Insulin Glargine (BASAGLAR KWIKPEN) 100 UNIT/ML injection pen Inject 60 Units under the skin into the appropriate area as directed 2 (Two) Times a Day. 9/27/21  Yes Elvis Gamboa APRN   Insulin Pen Needle (B-D ULTRAFINE III SHORT PEN) 31G X 8 MM misc USE TO INJECT 5 TIMES A DAY 8/26/21  Yes Elvis Gamboa APRN   Insulin Pen Needle 31G X 8 MM misc Use up to 4 (four) times daily with insulin as directed. 7/12/21  Yes Brandon Martel MD   Lancets (accu-chek soft touch) lancets As directed 10/18/21  Yes Kimberly Ferguson, APRN  "  lisinopril (PRINIVIL,ZESTRIL) 20 MG tablet TAKE 1 TABLET BY MOUTH EVERY DAY 11/29/21  Yes Kimberly Ferguson APRN   LORazepam (ATIVAN) 0.5 MG tablet TAKE 1 TABLET BY MOUTH EVERY 8 HOURS. 2/3/22  Yes Kimberly Ferguson APRN   meclizine (ANTIVERT) 25 MG tablet Take 1 tablet by mouth 3 (Three) Times a Day As Needed for dizziness. 12/14/17  Yes Evon Hernandez APRN   methocarbamol (ROBAXIN) 500 MG tablet TAKE 1 TABLET BY MOUTH 2 (TWO) TIMES A DAY AS NEEDED FOR MUSCLE SPASMS. 12/16/21  Yes Kimberly Ferguson APRN   metoclopramide (REGLAN) 10 MG tablet TAKE 1 TABLET BY MOUTH 4 (FOUR) TIMES A DAY AS NEEDED (NAUSEA). 12/16/21  Yes Marcela Lawson APRN   metoprolol succinate XL (TOPROL-XL) 50 MG 24 hr tablet TAKE 1 TABLET BY MOUTH DAILY. DOSE INCREASED 5/24/21 12/16/21  Yes Kimberly Ferguson APRN   mirtazapine (REMERON) 30 MG tablet TAKE 1 TABLET BY MOUTH EVERY DAY AT NIGHT 12/16/21  Yes Kimberly Ferguson APRN   ondansetron (ZOFRAN) 8 MG tablet TAKE 1 TABLET BY MOUTH EVERY 8 (EIGHT) HOURS AS NEEDED FOR NAUSEA OR VOMITING. 12/17/21  Yes Kimberly Ferguson APRN   pantoprazole (PROTONIX) 20 MG EC tablet Take 1 tablet by mouth Daily. 7/19/21  Yes Marcela Lawson APRN   Syringe/Needle, Disp, (Luer Lock Safety Syringes) 25G X 5/8\" 3 ML misc 1 each Daily. 1 Q28 DAYS 12/13/21  Yes Kimberly Ferguson APRN   venlafaxine XR (EFFEXOR-XR) 150 MG 24 hr capsule TAKE 1 CAPSULE BY MOUTH TWICE A DAY 11/19/21  Yes Kimberly Ferguson APRN   vitamin D (ERGOCALCIFEROL) 1.25 MG (00426 UT) capsule capsule TAKE ONE CAPSULE BY MOUTH EVERY SUNDAY. 12/16/21  Yes Kimberly Ferguson APRN   naloxone (NARCAN) 0.4 MG/ML injection Infuse 0.5 mL into a venous catheter Every 5 (Five) Minutes As Needed for Opioid Reversal. 3/13/21   Nick Faye MD   nitroglycerin (NITROSTAT) 0.4 MG SL tablet Place 1 tablet under the tongue Every 5 (Five) Minutes As Needed for Chest Pain. Take no more than 3 doses in 15 minutes. 4/29/21   Ferguson, Kimberly" BEBE [place]   ofloxacin (FLOXIN) 0.3 % otic solution Administer 5 drops to the right ear 2 (Two) Times a Day. 9/9/21   Ferguson, BEBE Luu   tamsulosin (FLOMAX) 0.4 MG capsule 24 hr capsule Take 1 capsule by mouth Daily. 7/12/21   Brandon Martel MD       Review of Systems:  Review of Systems   Constitutional: Negative for appetite change, chills, fatigue, fever and unexpected weight change.   HENT: Negative for congestion, facial swelling, hearing loss, nosebleeds, rhinorrhea, sneezing, trouble swallowing and voice change.    Eyes: Positive for visual disturbance. Negative for photophobia.   Respiratory: Negative for apnea, cough, choking, chest tightness, shortness of breath, wheezing and stridor.    Cardiovascular: Negative for chest pain, palpitations and leg swelling.   Gastrointestinal: Negative for abdominal pain, blood in stool, constipation, diarrhea, nausea and vomiting.   Endocrine: Negative for cold intolerance, heat intolerance, polydipsia, polyphagia and polyuria.   Genitourinary: Negative for dysuria, flank pain and hematuria.   Musculoskeletal: Negative for arthralgias, back pain, myalgias and neck pain.   Skin: Negative for rash and wound.   Allergic/Immunologic: Negative for immunocompromised state.   Neurological: Negative for dizziness, seizures, syncope, speech difficulty, weakness, light-headedness, numbness and headaches.   Hematological: Does not bruise/bleed easily.   Psychiatric/Behavioral: Negative for agitation, behavioral problems, confusion, decreased concentration, hallucinations, self-injury and suicidal ideas. The patient is not nervous/anxious.       Otherwise complete ROS is negative except as mentioned above.    Physical Exam:   Temp:  [98.4 °F (36.9 °C)] 98.4 °F (36.9 °C)  Heart Rate:  [64-74] 74  Resp:  [16-20] 16  BP: (106-163)/(58-71) 160/69  Physical Exam  Constitutional:       Appearance: She is well-developed.   HENT:      Head: Normocephalic and atraumatic.       Nose: Nose normal.   Eyes:      General: Lids are normal. No scleral icterus.     Conjunctiva/sclera: Conjunctivae normal.      Pupils: Pupils are equal, round, and reactive to light.      Comments: Decreased visual changes left eye   Neck:      Vascular: No JVD.      Trachea: No tracheal tenderness or tracheal deviation.   Cardiovascular:      Rate and Rhythm: Normal rate and regular rhythm.      Pulses: Normal pulses.      Heart sounds: Normal heart sounds, S1 normal and S2 normal. No murmur heard.  No friction rub. No gallop.    Pulmonary:      Effort: Pulmonary effort is normal. No accessory muscle usage or respiratory distress.      Breath sounds: Normal breath sounds. No decreased breath sounds, wheezing or rales.   Chest:      Chest wall: No tenderness.   Abdominal:      General: Bowel sounds are normal. There is no distension.      Palpations: Abdomen is soft. There is no mass.      Tenderness: There is no abdominal tenderness. There is no guarding or rebound.   Musculoskeletal:         General: No tenderness.      Cervical back: Normal range of motion and neck supple. No spinous process tenderness.      Comments: Left BKA.   Skin:     General: Skin is warm.      Coloration: Skin is not pale.      Findings: No rash.   Neurological:      Mental Status: She is alert and oriented to person, place, and time.      Cranial Nerves: No cranial nerve deficit.      Sensory: No sensory deficit.      Motor: No atrophy, abnormal muscle tone or seizure activity.      Coordination: Coordination normal.      Deep Tendon Reflexes: Reflexes are normal and symmetric. Reflexes normal.   Psychiatric:         Behavior: Behavior normal.         Thought Content: Thought content normal.         Judgment: Judgment normal.           Results Reviewed:  I have personally reviewed current lab, radiology, and data and agree with results.  Lab Results (last 24 hours)     Procedure Component Value Units Date/Time    COVID-19 and FLU A/B PCR  - Swab, Nasopharynx [[id number]]  (Normal) Collected: 02/05/22 1448    Specimen: Swab from Nasopharynx Updated: 02/05/22 1511     COVID19 Not Detected     Influenza A PCR Not Detected     Influenza B PCR Not Detected    Narrative:      Fact sheet for providers: https://www.fda.gov/media/394894/download    Fact sheet for patients: https://www.fda.gov/media/276356/download    Test performed by PCR.    Sedimentation Rate [[id number]]  (Abnormal) Collected: 02/05/22 1139    Specimen: Blood Updated: 02/05/22 1357     Sed Rate 35 mm/hr     Extra Tubes [[id number]] Collected: 02/05/22 1139    Specimen: Blood, Venous Line Updated: 02/05/22 1245    Narrative:      The following orders were created for panel order Extra Tubes.  Procedure                               Abnormality         Status                     ---------                               -----------         ------                     [place] Blood Culture Kev...[[id number]]                      Final result                 Please view results for these tests on the individual orders.    [place] Blood Culture Bottle Set [[id number]] Collected: 02/05/22 1139    Specimen: Blood Updated: 02/05/22 1245     Extra Tube Hold for add-ons.     Comment: Auto resulted.       [place] Draw [[id number]] Collected: 02/05/22 1139    Specimen: Blood Updated: 02/05/22 1245    Narrative:      The following orders were created for panel order [place] Draw.  Procedure                               Abnormality         Status                     ---------                               -----------         ------                     Green Top (Gel)[[id number]]                                  Final result               Lavender Top[[id number]]                                     Final result               Gold Top - SST[[id number]]                                   Final result               Light Blue Top[[id number]]                                   Final result                 Please view  results for these tests on the individual orders.    Light Blue Top [[id number]] Collected: 02/05/22 1139    Specimen: Blood Updated: 02/05/22 1245     Extra Tube hold for add-on     Comment: Auto resulted       Green Top (Gel) [[id number]] Collected: 02/05/22 1139    Specimen: Blood Updated: 02/05/22 1245     Extra Tube Hold for add-ons.     Comment: Auto resulted.       Lavender Top [[id number]] Collected: 02/05/22 1139    Specimen: Blood Updated: 02/05/22 1245     Extra Tube hold for add-on     Comment: Auto resulted       Gold Top - SST [[id number]] Collected: 02/05/22 1139    Specimen: Blood Updated: 02/05/22 1245     Extra Tube Hold for add-ons.     Comment: Auto resulted.       C-reactive Protein [[id number]]  (Normal) Collected: 02/05/22 1139    Specimen: Blood Updated: 02/05/22 1240     C-Reactive Protein <0.30 mg/dL     Protime-INR [[id number]]  (Normal) Collected: 02/05/22 1139    Specimen: Blood Updated: 02/05/22 1223     Protime 12.6 Seconds      INR 0.95    Narrative:      Therapeutic range for most indications is 2.0-3.0 INR,  or 2.5-3.5 for mechanical heart valves.    Comprehensive Metabolic Panel [[id number]]  (Abnormal) Collected: 02/05/22 1139    Specimen: Blood Updated: 02/05/22 1221     Glucose 76 mg/dL      BUN 21 mg/dL      Creatinine 1.20 mg/dL      Sodium 140 mmol/L      Potassium 4.1 mmol/L      Chloride 103 mmol/L      CO2 28.0 mmol/L      Calcium 9.9 mg/dL      Total Protein 6.8 g/dL      Albumin 4.10 g/dL      ALT (SGPT) 15 U/L      AST (SGOT) 12 U/L      Alkaline Phosphatase 132 U/L      Total Bilirubin 0.2 mg/dL      eGFR Non African Amer 46 mL/min/1.73      Globulin 2.7 gm/dL      A/G Ratio 1.5 g/dL      BUN/Creatinine Ratio 17.5     Anion Gap 9.0 mmol/L     Narrative:      GFR Normal >60  Chronic Kidney Disease <60  Kidney Failure <15      CBC & Differential [[id number]]  (Abnormal) Collected: 02/05/22 1139    Specimen: Blood Updated: 02/05/22 1154    Narrative:      The following orders  were created for panel order CBC & Differential.  Procedure                               Abnormality         Status                     ---------                               -----------         ------                     CBC Auto Differential[[id number]]        Abnormal            Final result                 Please view results for these tests on the individual orders.    CBC Auto Differential [[id number]]  (Abnormal) Collected: 02/05/22 1139    Specimen: Blood Updated: 02/05/22 1154     WBC 13.04 10*3/mm3      RBC 4.12 10*6/mm3      Hemoglobin 12.0 g/dL      Hematocrit 35.7 %      MCV 86.7 fL      MCH 29.1 pg      MCHC 33.6 g/dL      RDW 13.1 %      RDW-SD 40.6 fl      MPV 9.5 fL      Platelets 419 10*3/mm3      Neutrophil % 58.9 %      Lymphocyte % 29.3 %      Monocyte % 7.2 %      Eosinophil % 3.6 %      Basophil % 0.5 %      Immature Grans % 0.5 %      Neutrophils, Absolute 7.69 10*3/mm3      Lymphocytes, Absolute 3.82 10*3/mm3      Monocytes, Absolute 0.94 10*3/mm3      Eosinophils, Absolute 0.47 10*3/mm3      Basophils, Absolute 0.06 10*3/mm3      Immature Grans, Absolute 0.06 10*3/mm3      nRBC 0.0 /100 WBC     Extra Tubes [[id number]] Collected: 02/05/22 1139    Specimen: Blood, Venous Line Updated: 02/05/22 1149    Narrative:      The following orders were created for panel order Extra Tubes.  Procedure                               Abnormality         Status                     ---------                               -----------         ------                     Suggs Top[[id number]]                                         In process                   Please view results for these tests on the individual orders.    Gray Top [[id number]] Collected: 02/05/22 1139    Specimen: Blood Updated: 02/05/22 1149        Imaging Results (Last 24 Hours)     Procedure Component Value Units Date/Time    CT Angiogram Head w AI Analysis of LVO [[id number]] Collected: 02/05/22 1139     Updated: 02/05/22 1309    Narrative:       CT ANGIOGRAPHY HEAD AND NECK WITH INTRAVENOUS CONTRAST    CLINICAL HISTORY:  62 years Female,Acute Stroke    COMPARISON: Head CT without IV contrast dated 2/5/2022    TECHNIQUE: Images of the head were obtained before and after  intravenous administration of 50 mL Omnipaque 350 utilizing the  digital subtraction multiphase CT angiography protocol. Axial as  well as coronal and sagittal reformatted images were obtained.   Digital subtraction 3-dimensional reconstructed images were  created at a separate workstation.  This examination was  performed according to our departmental dose optimization program  which includes automated exposure control, adjustment of the MA  and kV according to patient size, and/or use of iterative  reconstruction technique.    FINDINGS:   Variant aortic arch anatomy with the left common carotid artery  arising from the brachiocephalic artery. The bilateral common  carotid arteries are widely patent, without focal stenosis.  Plaque about the bilateral carotid bifurcation and proximal  internal carotid arteries produces less than 50% stenosis  bilaterally. The mid and distal internal carotid arteries  continue to the skull base without focal stenosis.    The proximal external carotid arteries are widely patent.    The vertebral arteries arise from their respective subclavian  arteries. The bilateral vertebral arteries are well-opacified  throughout their course and are codominant in their supply of the  basilar artery.    There is plaque along the bilateral intracranial internal carotid  arteries, with less than 50% associated stenosis. The A1 and M1  segments are unremarkable in appearance.    Normal appearance to the basilar artery. The P1 segments are  unremarkable in appearance.    No aneurysm seen.    The prevertebral soft tissues are unremarkable. The visualized  lung apices are clear.      Impression:      1. Plaque about the carotid bifurcations and proximal internal  carotid  artery with mild, less than 50% associated stenosis.   2. Patent bilateral vertebral arteries.  3. No large vessel intracranial arterial stenosis, occlusion, or  aneurysm.      Electronically signed by:  Natalio Frank MD  2/5/2022 1:07 PM [facility] [[email or web address]]  Workstation: [id number]    CT Angiogram Neck [[id number]] Collected: 02/05/22 1139     Updated: 02/05/22 1309    Narrative:      CT ANGIOGRAPHY HEAD AND NECK WITH INTRAVENOUS CONTRAST    CLINICAL HISTORY:  62 years Female,Acute Stroke    COMPARISON: Head CT without IV contrast dated 2/5/2022    TECHNIQUE: Images of the head were obtained before and after  intravenous administration of 50 mL Omnipaque 350 utilizing the  digital subtraction multiphase CT angiography protocol. Axial as  well as coronal and sagittal reformatted images were obtained.   Digital subtraction 3-dimensional reconstructed images were  created at a separate workstation.  This examination was  performed according to our departmental dose optimization program  which includes automated exposure control, adjustment of the MA  and kV according to patient size, and/or use of iterative  reconstruction technique.    FINDINGS:   Variant aortic arch anatomy with the left common carotid artery  arising from the brachiocephalic artery. The bilateral common  carotid arteries are widely patent, without focal stenosis.  Plaque about the bilateral carotid bifurcation and proximal  internal carotid arteries produces less than 50% stenosis  bilaterally. The mid and distal internal carotid arteries  continue to the skull base without focal stenosis.    The proximal external carotid arteries are widely patent.    The vertebral arteries arise from their respective subclavian  arteries. The bilateral vertebral arteries are well-opacified  throughout their course and are codominant in their supply of the  basilar artery.    There is plaque along the bilateral intracranial internal carotid  arteries, with less than 50% associated  stenosis. The A1 and M1  segments are unremarkable in appearance.    Normal appearance to the basilar artery. The P1 segments are  unremarkable in appearance.    No aneurysm seen.    The prevertebral soft tissues are unremarkable. The visualized  lung apices are clear.      Impression:      1. Plaque about the carotid bifurcations and proximal internal  carotid artery with mild, less than 50% associated stenosis.   2. Patent bilateral vertebral arteries.  3. No large vessel intracranial arterial stenosis, occlusion, or  aneurysm.      Electronically signed by:  Natalio Frank MD  2/5/2022 1:07 PM CST  Workstation: [id number]    CT Head Without Contrast Stroke Protocol [[id number]] Collected: 02/05/22 1124     Updated: 02/05/22 1141    Narrative:      Noncontrast CT examination of the brain.    INDICATION: Stroke protocol follow-up       Technique: Axial 5 mm contiguous images with brain parenchymal  and bone windows    This exam was performed according to our departmental  dose-optimization program, which includes automated exposure  control, adjustment of the mA and/or kV according to patient size  and/or use of iterative reconstruction technique.    Prior relevant examination: None.    Brain parenchyma appears within normal limits. Ventricles are  within normal limits in size. No evidence of abnormal mass or  calcification is seen. No evidence of acute hemorrhage is noted.  Bony structures appear within normal limits and the mastoid air  cells and visualized paranasal sinuses are normally aerated.        Impression:      1. Normal brain for age. There are no acute changes.    Electronically signed by:  Naveed Taylor MD  2/5/2022 11:39 AM CST  Workstation: [id number]    XR Chest 1 View [[id number]] Collected: 02/05/22 1056     Updated: 02/05/22 1120    Narrative:      Chest x-ray single view.       CLINICAL INDICATION: Shortness of breath. Stroke protocol    COMPARISON: Chest July 2, 2021.    FINDINGS: Cardiac silhouette is  normal in size. Pulmonary  vascularity is unremarkable.     Sternal sutures from prior cardiac surgery. Chronic appearing  elevation right diaphragm. Lungs are otherwise clear.  No focal infiltrate or consolidation.  No pleural effusion.  No  pneumothorax.      Impression:      No evidence of active disease.       Electronically signed by:  Naveed Taylor MD  2/5/2022 11:18 AM CST  Workstation: [id number]            Assessment:    Active Hospital Problems    Diagnosis    • TIA (transient ischemic attack)              Plan:  1.  TIA: Seen by neurology.  MRI pending.  Continue aspirin and Lipitor.  2.  Diabetes: Placed on Levemir and sliding scale.  3.  Hypertension: Patient states that her blood pressure runs fairly low at home.  Will hold antihypertensives for now.  4.  Coronary artery disease: Continue home medication.  5.  DVT prophylaxis: SCDs.    I confirmed that the patient's Advance Care Plan is present, code status is documented, or surrogate decision maker is listed in the patient's medical record.     I have utilized all available immediate resources to obtain, update, or review the patient's current medications.     I discussed the patient's findings and my recommendations with: Patient        This document has been electronically signed by Mahin Esteves MD on February 5, 2022 15:40 CST

## 2022-02-05 NOTE — ED PROVIDER NOTES
"Subjective   Patient states she had sudden onset of blurry vision to her left eye that started at 2200 last night. She states for the past week she has been having intermittent left sided head pain that is different from a normal headache. Pain would last about 5-10 minutes and resolve on own. Would occur 2-3 times per day. Last head pain was on Thursday. She states on Thursday she also had about a 30 minutes span of where she became very drowsy and had slurred speech. She states her  made a comment to her about her behavior. After 30 minutes she was back to normal. Once her vision became blurred last night she states she felt like her left eye was \"swollen\". She had difficulty sleeping. Denies pain at this time. Denies headache or eye pain. Vision remains blurry at this time. She states she can continue to see colors. Has not taken anything for the symptoms.           Review of Systems   Constitutional: Negative for chills, fatigue and fever.   Eyes: Positive for visual disturbance. Negative for photophobia and pain.   Respiratory: Negative for shortness of breath.    Cardiovascular: Negative for chest pain.   Gastrointestinal: Negative for abdominal pain, nausea and vomiting.   Genitourinary: Negative.    Musculoskeletal: Negative.    Skin: Negative.    Neurological: Positive for headaches. Negative for dizziness, weakness and numbness.   Psychiatric/Behavioral: Negative.        Past Medical History:   Diagnosis Date   • Angina, class IV ([facility])    • Anxiety    • Anxiety and depression    • Arthritis    • Benign paroxysmal positional vertigo    • Bladder disorder     has bladder stimulator   • Carpal tunnel syndrome    • CHF (congestive heart failure) ([facility])    • Chronic pain    • Coronary atherosclerosis     hx CABG 2005.  is followed by Dr Kwon   • Depression    • Diabetes mellitus ([facility])     Type 2, controlled   • Diabetic polyneuropathy ([facility])    • Disease of thyroid gland    • Elevated cholesterol    • " Female stress incontinence    • Foot pain, left    • Full dentures    • Gastroparesis    • GERD (gastroesophageal reflux disease)    • Hyperlipidemia    • Hypertension    • Low back pain    • Malaise and fatigue    • Multiple joint pain    • Obesity     Refuses to be weighed   • Occlusion and stenosis of bilateral carotid arteries 6/18/2021   • Otalgia     Both   • Perforation of tympanic membrane     Left   • Postoperative wound infection    • Vitamin D deficiency    • Wears glasses     reading       Allergies   Allergen Reactions   • Seroquel [Quetiapine] Anaphylaxis   • Avandia [Rosiglitazone] Swelling   • Morphine And Related Hallucinations   • Oxycodone Hallucinations       Past Surgical History:   Procedure Laterality Date   • ABDOMINAL SURGERY     • AMPUTATION FOOT     • ANGIOPLASTY      coronary   • ANKLE ARTHROSCOPY Left 2/26/2021    Procedure: Left foot hardware removal, ankle arthroscopy, bone grafting , left foot exostectomy;  Surgeon: Ignacio Lord DPM;  Location: [facility] OR;  Service: Podiatry;  Laterality: Left;   • BREAST BIOPSY Right    • CARDIAC CATHETERIZATION     • CARDIAC CATHETERIZATION N/A 6/20/2017    Procedure: Right Heart Cath;  Surgeon: Can Kwon MD PhD;  Location: [facility] CATH INVASIVE LOCATION;  Service:    • CARDIAC CATHETERIZATION N/A 2/18/2020    Procedure: Left Heart Cath;  Surgeon: Catalina Cooper MD;  Location: [facility] CATH INVASIVE LOCATION;  Service: Cardiology;  Laterality: N/A;   • CARPAL TUNNEL RELEASE     • CHOLECYSTECTOMY     • COLONOSCOPY N/A 6/24/2020    Procedure: COLONOSCOPY;  Surgeon: Julián Maldonado MD;  Location: [facility] ENDOSCOPY;  Service: Gastroenterology;  Laterality: N/A;   • CORONARY ARTERY BYPASS GRAFT  2005   • ENDOSCOPY N/A 10/19/2018    Procedure: ESOPHAGOGASTRODUODENOSCOPY possible dilation;  Surgeon: Julián Maldonado MD;  Location: [facility] ENDOSCOPY;  Service: Gastroenterology   • ENDOSCOPY N/A 6/24/2020    Procedure:  ESOPHAGOGASTRODUODENOSCOPY WED appt please;  Surgeon: Julián Maldonado MD;  Location: [facility] ENDOSCOPY;  Service: Gastroenterology;  Laterality: N/A;   • ENDOSCOPY AND COLONOSCOPY     • FOOT SURGERY      Toes   • FOOT SURGERY     • GASTRIC BANDING      Revision, laparoscopic   • HYSTERECTOMY     • INCISION AND DRAINAGE LEG Left 3/12/2021    Procedure: Left ankle arthroscopic irrigation and debridement, screw removal;  Surgeon: Ignacio Lord DPM;  Location: [facility] OR;  Service: Podiatry;  Laterality: Left;   • MOUTH SURGERY     • SALPINGO OOPHORECTOMY     • SHOULDER SURGERY     • SUBTALAR ARTHRODESIS Left 1/16/2019    Procedure: LEFT FOOT HARDWARE REMOVAL, FIFTH METATARSAL , OPEN REDUCTION INTERNAL FIXATION, CALCANEAL OSTEOTOMY;  Surgeon: Ignacio Lord DPM;  Location: [facility] OR;  Service: Podiatry   • SUBTALAR ARTHRODESIS Left 10/16/2019    Procedure: foot hardware removal, subtalar joint fusion  possible de/reattachment of achilles tendon        (c-arm);  Surgeon: Ignacio Lord DPM;  Location: [facility] OR;  Service: Podiatry   • SUBTALAR ARTHRODESIS Left 9/30/2020    Procedure: subtalar, talonavicular joint arthrodesis.  Removal hardware.          (c-arm);  Surgeon: Ignacio Lord DPM;  Location: [facility] OR;  Service: Podiatry;  Laterality: Left;   • TRANSESOPHAGEAL ECHOCARDIOGRAM ([place]AMONTE)      With color flow       Family History   Problem Relation Age of Onset   • Diabetes Other    • Heart disease Other    • Hypertension Other    • Heart disease Mother    • Stroke Mother    • Hypertension Mother    • Diabetes Sister    • Heart disease Sister    • Hypertension Sister    • Heart disease Sister    • Diabetes Sister    • Hypertension Sister    • Diabetes Sister    • Diabetes Sister    • Diabetes Sister    • Diabetes Sister        Social History     Socioeconomic History   • Marital status:    Tobacco Use   • Smoking status: Never Smoker   • Smokeless tobacco: Never Used   Vaping Use   • Vaping  "Use: Never used   Substance and Sexual Activity   • Alcohol use: No   • Drug use: No   • Sexual activity: Defer           Objective    /70   Pulse 80   Temp 97.9 °F (36.6 °C) (Oral)   Resp 18   Ht 172.7 cm (68\")   Wt 104 kg (230 lb)   SpO2 95%   BMI 34.97 kg/m²     Physical Exam  Vitals and nursing note reviewed.   Constitutional:       General: She is not in acute distress.     Appearance: She is well-developed. She is not ill-appearing.   HENT:      Head: Normocephalic and atraumatic.      Right Ear: External ear normal.      Left Ear: External ear normal.      Nose: Nose normal.      Mouth/Throat:      Mouth: Mucous membranes are moist.      Pharynx: Oropharynx is clear.   Eyes:      Extraocular Movements: Extraocular movements intact.      Conjunctiva/sclera: Conjunctivae normal.      Pupils: Pupils are equal, round, and reactive to light.   Cardiovascular:      Rate and Rhythm: Normal rate and regular rhythm.      Heart sounds: Normal heart sounds. No murmur heard.      Pulmonary:      Effort: Pulmonary effort is normal. No respiratory distress.      Breath sounds: Normal breath sounds. No wheezing.   Abdominal:      General: Bowel sounds are normal. There is no distension.      Palpations: Abdomen is soft.      Tenderness: There is no abdominal tenderness.   Musculoskeletal:         General: Normal range of motion.      Cervical back: Normal range of motion and neck supple.   Skin:     General: Skin is warm and dry.      Capillary Refill: Capillary refill takes less than 2 seconds.   Neurological:      Mental Status: She is alert and oriented to person, place, and time.      GCS: GCS eye subscore is 4. GCS verbal subscore is 5. GCS motor subscore is 6.      Cranial Nerves: Cranial nerves are intact.      Sensory: Sensation is intact.      Motor: Motor function is intact. No weakness.      Coordination: Coordination is intact. Coordination normal.   Psychiatric:         Behavior: Behavior normal.   "       Thought Content: Thought content normal.         Judgment: Judgment normal.         ECG 12 Lead      Date/Time: 2/5/2022 10:18 AM  Performed by: Radha Barrow APRN  Authorized by: Radha Barrow APRN   Interpreted by physician  Rhythm: sinus rhythm  Rate: normal  BPM: 70  ST Segments: ST segments normal          Results for orders placed or performed during the hospital encounter of 02/05/22   COVID-19 and FLU A/B PCR - Swab, Nasopharynx    Specimen: Nasopharynx; Swab   Result Value Ref Range    COVID19 Not Detected Not Detected - Ref. Range    Influenza A PCR Not Detected Not Detected    Influenza B PCR Not Detected Not Detected   Comprehensive Metabolic Panel    Specimen: Blood   Result Value Ref Range    Glucose 76 65 - 99 mg/dL    BUN 21 8 - 23 mg/dL    Creatinine 1.20 (H) 0.57 - 1.00 mg/dL    Sodium 140 136 - 145 mmol/L    Potassium 4.1 3.5 - 5.2 mmol/L    Chloride 103 98 - 107 mmol/L    CO2 28.0 22.0 - 29.0 mmol/L    Calcium 9.9 8.6 - 10.5 mg/dL    Total Protein 6.8 6.0 - 8.5 g/dL    Albumin 4.10 3.50 - 5.20 g/dL    ALT (SGPT) 15 1 - 33 U/L    AST (SGOT) 12 1 - 32 U/L    Alkaline Phosphatase 132 (H) 39 - 117 U/L    Total Bilirubin 0.2 0.0 - 1.2 mg/dL    eGFR Non African Amer 46 (L) >60 mL/min/1.73    Globulin 2.7 gm/dL    A/G Ratio 1.5 g/dL    BUN/Creatinine Ratio 17.5 7.0 - 25.0    Anion Gap 9.0 5.0 - 15.0 mmol/L   Protime-INR    Specimen: Blood   Result Value Ref Range    Protime 12.6 11.1 - 15.3 Seconds    INR 0.95 0.80 - 1.20   Sedimentation Rate    Specimen: Blood   Result Value Ref Range    Sed Rate 35 (H) 0 - 20 mm/hr   CBC Auto Differential    Specimen: Blood   Result Value Ref Range    WBC 13.04 (H) 3.40 - 10.80 10*3/mm3    RBC 4.12 3.77 - 5.28 10*6/mm3    Hemoglobin 12.0 12.0 - 15.9 g/dL    Hematocrit 35.7 34.0 - 46.6 %    MCV 86.7 79.0 - 97.0 fL    MCH 29.1 26.6 - 33.0 pg    MCHC 33.6 31.5 - 35.7 g/dL    RDW 13.1 12.3 - 15.4 %    RDW-SD 40.6 37.0 - 54.0 fl    MPV 9.5 6.0 -  12.0 fL    Platelets 419 140 - 450 10*3/mm3    Neutrophil % 58.9 42.7 - 76.0 %    Lymphocyte % 29.3 19.6 - 45.3 %    Monocyte % 7.2 5.0 - 12.0 %    Eosinophil % 3.6 0.3 - 6.2 %    Basophil % 0.5 0.0 - 1.5 %    Immature Grans % 0.5 0.0 - 0.5 %    Neutrophils, Absolute 7.69 (H) 1.70 - 7.00 10*3/mm3    Lymphocytes, Absolute 3.82 (H) 0.70 - 3.10 10*3/mm3    Monocytes, Absolute 0.94 (H) 0.10 - 0.90 10*3/mm3    Eosinophils, Absolute 0.47 (H) 0.00 - 0.40 10*3/mm3    Basophils, Absolute 0.06 0.00 - 0.20 10*3/mm3    Immature Grans, Absolute 0.06 (H) 0.00 - 0.05 10*3/mm3    nRBC 0.0 0.0 - 0.2 /100 WBC   C-reactive Protein    Specimen: Blood   Result Value Ref Range    C-Reactive Protein <0.30 0.00 - 0.50 mg/dL   POC Glucose Once    Specimen: Blood   Result Value Ref Range    Glucose 77 70 - 130 mg/dL   POC Glucose Once    Specimen: Blood   Result Value Ref Range    Glucose 111 70 - 130 mg/dL   Type & Screen    Specimen: Blood   Result Value Ref Range    ABO Type A     RH type Negative     Antibody Screen Negative     T&S Expiration Date 2/8/2022 11:59:59 PM    PREVIOUS HISTORY    Specimen: Blood   Result Value Ref Range    Previous History No record on File    ABO RH Specimen Verification    Specimen: Blood   Result Value Ref Range    ABO Type A     RH type Negative    Green Top (Gel)   Result Value Ref Range    Extra Tube Hold for add-ons.    Lavender Top   Result Value Ref Range    Extra Tube hold for add-on    Gold Top - SST   Result Value Ref Range    Extra Tube Hold for add-ons.    Light Blue Top   Result Value Ref Range    Extra Tube hold for add-on    Gray Top   Result Value Ref Range    Extra Tube Hold for add-ons.    [place] Blood Culture Bottle Set    Specimen: Blood   Result Value Ref Range    Extra Tube Hold for add-ons.      *Note: Due to a large number of results and/or encounters for the requested time period, some results have not been displayed. A complete set of results can be found in Results Review.     CT  Angiogram Neck    Result Date: 2/5/2022  Narrative: CT ANGIOGRAPHY HEAD AND NECK WITH INTRAVENOUS CONTRAST CLINICAL HISTORY:  62 years Female,Acute Stroke COMPARISON: Head CT without IV contrast dated 2/5/2022 TECHNIQUE: Images of the head were obtained before and after intravenous administration of 50 mL Omnipaque 350 utilizing the digital subtraction multiphase CT angiography protocol. Axial as well as coronal and sagittal reformatted images were obtained. Digital subtraction 3-dimensional reconstructed images were created at a separate workstation.  This examination was performed according to our departmental dose optimization program which includes automated exposure control, adjustment of the MA and kV according to patient size, and/or use of iterative reconstruction technique. FINDINGS: Variant aortic arch anatomy with the left common carotid artery arising from the brachiocephalic artery. The bilateral common carotid arteries are widely patent, without focal stenosis. Plaque about the bilateral carotid bifurcation and proximal internal carotid arteries produces less than 50% stenosis bilaterally. The mid and distal internal carotid arteries continue to the skull base without focal stenosis. The proximal external carotid arteries are widely patent. The vertebral arteries arise from their respective subclavian arteries. The bilateral vertebral arteries are well-opacified throughout their course and are codominant in their supply of the basilar artery. There is plaque along the bilateral intracranial internal carotid arteries, with less than 50% associated stenosis. The A1 and M1 segments are unremarkable in appearance. Normal appearance to the basilar artery. The P1 segments are unremarkable in appearance. No aneurysm seen. The prevertebral soft tissues are unremarkable. The visualized lung apices are clear.     Impression: 1. Plaque about the carotid bifurcations and proximal internal carotid artery with mild,  less than 50% associated stenosis. 2. Patent bilateral vertebral arteries. 3. No large vessel intracranial arterial stenosis, occlusion, or aneurysm.  Electronically signed by:  Natalio Frank MD  2/5/2022 1:07 PM CST Workstation: [id number]    XR Chest 1 View    Result Date: 2/5/2022  Narrative: Chest x-ray single view. CLINICAL INDICATION: Shortness of breath. Stroke protocol COMPARISON: Chest July 2, 2021. FINDINGS: Cardiac silhouette is normal in size. Pulmonary vascularity is unremarkable. Sternal sutures from prior cardiac surgery. Chronic appearing elevation right diaphragm. Lungs are otherwise clear. No focal infiltrate or consolidation.  No pleural effusion.  No pneumothorax.     Impression: No evidence of active disease.  Electronically signed by:  Naveed Taylor MD  2/5/2022 11:18 AM CST Workstation: [id number]    CT Head Without Contrast Stroke Protocol    Result Date: 2/5/2022  Narrative: Noncontrast CT examination of the brain. INDICATION: Stroke protocol follow-up   Technique: Axial 5 mm contiguous images with brain parenchymal and bone windows This exam was performed according to our departmental dose-optimization program, which includes automated exposure control, adjustment of the mA and/or kV according to patient size and/or use of iterative reconstruction technique. Prior relevant examination: None. Brain parenchyma appears within normal limits. Ventricles are within normal limits in size. No evidence of abnormal mass or calcification is seen. No evidence of acute hemorrhage is noted. Bony structures appear within normal limits and the mastoid air cells and visualized paranasal sinuses are normally aerated.     Impression: 1. Normal brain for age. There are no acute changes. Electronically signed by:  Naveed Taylor MD  2/5/2022 11:39 AM CST Workstation: [id number]    CT Angiogram Head w AI Analysis of LVO    Result Date: 2/5/2022  Narrative: CT ANGIOGRAPHY HEAD AND NECK WITH INTRAVENOUS CONTRAST CLINICAL  HISTORY:  62 years Female,Acute Stroke COMPARISON: Head CT without IV contrast dated 2/5/2022 TECHNIQUE: Images of the head were obtained before and after intravenous administration of 50 mL Omnipaque 350 utilizing the digital subtraction multiphase CT angiography protocol. Axial as well as coronal and sagittal reformatted images were obtained. Digital subtraction 3-dimensional reconstructed images were created at a separate workstation.  This examination was performed according to our departmental dose optimization program which includes automated exposure control, adjustment of the MA and kV according to patient size, and/or use of iterative reconstruction technique. FINDINGS: Variant aortic arch anatomy with the left common carotid artery arising from the brachiocephalic artery. The bilateral common carotid arteries are widely patent, without focal stenosis. Plaque about the bilateral carotid bifurcation and proximal internal carotid arteries produces less than 50% stenosis bilaterally. The mid and distal internal carotid arteries continue to the skull base without focal stenosis. The proximal external carotid arteries are widely patent. The vertebral arteries arise from their respective subclavian arteries. The bilateral vertebral arteries are well-opacified throughout their course and are codominant in their supply of the basilar artery. There is plaque along the bilateral intracranial internal carotid arteries, with less than 50% associated stenosis. The A1 and M1 segments are unremarkable in appearance. Normal appearance to the basilar artery. The P1 segments are unremarkable in appearance. No aneurysm seen. The prevertebral soft tissues are unremarkable. The visualized lung apices are clear.     Impression: 1. Plaque about the carotid bifurcations and proximal internal carotid artery with mild, less than 50% associated stenosis. 2. Patent bilateral vertebral arteries. 3. No large vessel intracranial arterial  stenosis, occlusion, or aneurysm.  Electronically signed by:  Natalio Frank MD  2/5/2022 1:07 PM [facility] Workstation: [id number]             ED Course  ED Course as of 02/05/22 2127   Sat Feb 05, 2022   1013 Spoke with Dr. Mohsen. Advised to go ahead with CTA head and neck and also check for posstible giant cell arertitis. He will see patient in a few minutes.  [SH]   1109 Nursing remains unable to obtain IV access. Will send to CT for completion of CT head without contrast. Attempting to contact PICC team at this time.  [SH]   1315 Computer in room for neuro evaluation.  [SH]   1350 Lab called for sed rate results. States has about 5 minutes left on machine.  [SH]   1405 Patient take Norco for chronic pain. States she is having her chronic back pain and requesting her Norco at this time. [SH]   1413 Visual acuity completed 20/70 OS and 20/30 OD [SH]   1417 Hospitalist paged. [SH]   143 Spoke with Dr. Mccollum who agrees to admission.  []      ED Course User Index  [SH] Radha Barrow, BEBE                                                 [facility]    Final diagnoses:   TIA (transient ischemic attack)   Visual changes       ED Disposition  ED Disposition     ED Disposition Condition Comment    Decision to Admit  Level of Care: Stepdown [25]   Diagnosis: TIA (transient ischemic attack) [985602]   Admitting Physician: MICKEY MCCOLLUM [[id number]]   Attending Physician: MICKEY MCCOLLUM [[id number]]            No follow-up provider specified.       Medication List      No changes were made to your prescriptions during this visit.          Radha Barrow APRN  02/05/22 2127

## 2022-02-05 NOTE — CONSULTS
Stroke Consult Note    Patient Name: Ariana Martinez   MRN: [id number]  Age: 62 y.o.  Sex: female  : 1960    Primary Care Physician: Kimberly Ferguson APRN  Referring Physician:  Magnus Hsieh MD        Chief Complaint/Reason for Consultation: Right blurry vision  Subjective:  History obtained from patient and from medical staff, No  family members at bedside.  No previous history of recent stroke or transient ischemic attack, no history of seizures or passing out. No history of focal weakness suggesting relapsing remitting MS.  Patient taken no anticoagulation therapy or antiplatelet therapy.  No history of head trauma or meningitis or strong family history of epilepsy.  No strong family history of aneurysm. No recent change in patient daily routine.  No recent new medication her symptoms started unprovoked.  Last normal modern 24-hour, patient complained nonspecific headache and right Blurry vision, No other focal weakness, no seizures like activity.          Past Medical History:   Diagnosis Date   • Angina, class IV ([facility])    • Anxiety    • Anxiety and depression    • Arthritis    • Benign paroxysmal positional vertigo    • Bladder disorder     has bladder stimulator   • Carpal tunnel syndrome    • CHF (congestive heart failure) ([facility])    • Chronic pain    • Coronary atherosclerosis     hx CABG .  is followed by Dr Kwon   • Depression    • Diabetes mellitus ([facility])     Type 2, controlled   • Diabetic polyneuropathy ([facility])    • Disease of thyroid gland    • Elevated cholesterol    • Female stress incontinence    • Foot pain, left    • Full dentures    • Gastroparesis    • GERD (gastroesophageal reflux disease)    • Hyperlipidemia    • Hypertension    • Low back pain    • Malaise and fatigue    • Multiple joint pain    • Obesity     Refuses to be weighed   • Occlusion and stenosis of bilateral carotid arteries 2021   • Otalgia     Both   • Perforation of tympanic membrane     Left   •  Postoperative wound infection    • Vitamin D deficiency    • Wears glasses     reading     Past Surgical History:   Procedure Laterality Date   • ABDOMINAL SURGERY     • AMPUTATION FOOT     • ANGIOPLASTY      coronary   • ANKLE ARTHROSCOPY Left 2/26/2021    Procedure: Left foot hardware removal, ankle arthroscopy, bone grafting , left foot exostectomy;  Surgeon: Ignacio Lord DPM;  Location: [facility] OR;  Service: Podiatry;  Laterality: Left;   • BREAST BIOPSY Right    • CARDIAC CATHETERIZATION     • CARDIAC CATHETERIZATION N/A 6/20/2017    Procedure: Right Heart Cath;  Surgeon: Can Kwon MD PhD;  Location: [facility] CATH INVASIVE LOCATION;  Service:    • CARDIAC CATHETERIZATION N/A 2/18/2020    Procedure: Left Heart Cath;  Surgeon: Catalina Cooper MD;  Location: [facility] CATH INVASIVE LOCATION;  Service: Cardiology;  Laterality: N/A;   • CARPAL TUNNEL RELEASE     • CHOLECYSTECTOMY     • COLONOSCOPY N/A 6/24/2020    Procedure: COLONOSCOPY;  Surgeon: Julián Maldonado MD;  Location: [facility] ENDOSCOPY;  Service: Gastroenterology;  Laterality: N/A;   • CORONARY ARTERY BYPASS GRAFT  2005   • ENDOSCOPY N/A 10/19/2018    Procedure: ESOPHAGOGASTRODUODENOSCOPY possible dilation;  Surgeon: Julián Maldonado MD;  Location: [facility] ENDOSCOPY;  Service: Gastroenterology   • ENDOSCOPY N/A 6/24/2020    Procedure: ESOPHAGOGASTRODUODENOSCOPY WED appt please;  Surgeon: Julián Maldonado MD;  Location: [facility] ENDOSCOPY;  Service: Gastroenterology;  Laterality: N/A;   • ENDOSCOPY AND COLONOSCOPY     • FOOT SURGERY      Toes   • FOOT SURGERY     • GASTRIC BANDING      Revision, laparoscopic   • HYSTERECTOMY     • INCISION AND DRAINAGE LEG Left 3/12/2021    Procedure: Left ankle arthroscopic irrigation and debridement, screw removal;  Surgeon: Ignacio Lord DPM;  Location: [facility] OR;  Service: Podiatry;  Laterality: Left;   • MOUTH SURGERY     • SALPINGO OOPHORECTOMY     • SHOULDER SURGERY     • SUBTALAR ARTHRODESIS  Left 1/16/2019    Procedure: LEFT FOOT HARDWARE REMOVAL, FIFTH METATARSAL , OPEN REDUCTION INTERNAL FIXATION, CALCANEAL OSTEOTOMY;  Surgeon: Ignacio Lord DPM;  Location: [facility];  Service: Podiatry   • SUBTALAR ARTHRODESIS Left 10/16/2019    Procedure: foot hardware removal, subtalar joint fusion  possible de/reattachment of achilles tendon        (c-arm);  Surgeon: Ignacio Lord DPM;  Location: [facility];  Service: Podiatry   • SUBTALAR ARTHRODESIS Left 9/30/2020    Procedure: subtalar, talonavicular joint arthrodesis.  Removal hardware.          (c-arm);  Surgeon: Ignacio Lord DPM;  Location: [facility];  Service: Podiatry;  Laterality: Left;   • TRANSESOPHAGEAL ECHOCARDIOGRAM (LAMONTE)      With color flow     Family History   Problem Relation Age of Onset   • Diabetes Other    • Heart disease Other    • Hypertension Other    • Heart disease Mother    • Stroke Mother    • Hypertension Mother    • Diabetes Sister    • Heart disease Sister    • Hypertension Sister    • Heart disease Sister    • Diabetes Sister    • Hypertension Sister    • Diabetes Sister    • Diabetes Sister    • Diabetes Sister    • Diabetes Sister      Social History     Socioeconomic History   • Marital status:    Tobacco Use   • Smoking status: Never Smoker   • Smokeless tobacco: Never Used   Vaping Use   • Vaping Use: Never used   Substance and Sexual Activity   • Alcohol use: No   • Drug use: No   • Sexual activity: Defer       Allergies   Allergen Reactions   • Seroquel [Quetiapine] Anaphylaxis   • Avandia [Rosiglitazone] Swelling   • Morphine And Related Hallucinations   • Oxycodone Hallucinations     Prior to Admission medications    Medication Sig Start Date End Date Taking? Authorizing Provider   Accu-Chek Guide test strip USE AS INSTRUCTED 1/24/22   Marty, BEBE Luu   ARIPiprazole (ABILIFY) 15 MG tablet TAKE 1 TABLET BY MOUTH EVERY DAY 1/24/22   Kimberly Ferguson APRN   aspirin (aspirin) 81 MG EC  tablet Take 81 mg by mouth Daily.    Provider, MD Esther   atorvastatin (LIPITOR) 80 MG tablet TAKE 1 TABLET BY MOUTH EVERY DAY 12/6/21   Marty, BEEB Luu   BD SHARPS CONTAINER HOME misc 1 each Take As Directed. 9/7/18   Francisca Fong MD   Blood Glucose Monitoring Suppl (ONE TOUCH ULTRA MINI) w/Device kit Patient will need the Accu-Check Avitio meter 10/18/21   Marty, BEBE Luu   Calcium Citrate-Vitamin D (CITRACAL/VITAMIN D) 250-200 MG-UNIT tablet Take 2 tablets by mouth 2 (two) times a day.    Provider, MD Esther   clopidogrel (PLAVIX) 75 MG tablet TAKE 1 TABLET BY MOUTH EVERY DAY 12/2/21   Marty, BEBE Luu   cyanocobalamin 1000 MCG/ML injection INJECT 1 ML INTO THE APPROPRIATE MUSCLE AS DIRECTED BY PRESCRIBER EVERY 28 (TWENTY-EIGHT) DAYS. 10/5/21   Kimberly Ferguson APRN   folic acid (FOLVITE) 1 MG tablet Take 1 tablet by mouth Daily. 11/30/21   Teressa Hammond APRN   furosemide (LASIX) 40 MG tablet TAKE 1 TABLET BY MOUTH EVERY DAY 1/13/22   Kimberly Ferguson APRN   gabapentin (NEURONTIN) 400 MG capsule Take 1 capsule by mouth 3 (Three) Times a Day. 12/2/21   Kimberly Ferguson APRN   GLUCAGON EMERGENCY 1 MG injection USE AS DIRECTED AS NEEDED 7/28/17   Elvis Gamboa APRN   glucose monitor monitoring kit 1 each Daily. accucheck eve meter, E11.9 6/11/20   Elvis Gamboa APRN   hydroCHLOROthiazide (HYDRODIURIL) 25 MG tablet TAKE 1 TABLET BY MOUTH EVERY DAY 1/13/22   Kimberly Ferguson APRN   HYDROcodone-acetaminophen (NORCO) 7.5-325 MG per tablet Take 1 tablet by mouth Every 6 (Six) Hours As Needed for Moderate Pain . 1/21/22   Kimberly Ferguson APRN   insulin aspart (NovoLOG FlexPen) 100 UNIT/ML solution pen-injector sc pen Inject 0 to 14 units under the skin into the appropriate area 3 (three) times daily before meals according to sliding scale on attached sheet. 12/29/21   Kimberly Ferguson APRN   Insulin Glargine (BASAGLAR KWIKPEN) 100  UNIT/ML injection pen Inject 60 Units under the skin into the appropriate area as directed 2 (Two) Times a Day. 9/27/21   Elvis Gamboa APRN   Insulin Pen Needle (B-D ULTRAFINE III SHORT PEN) 31G X 8 MM misc USE TO INJECT 5 TIMES A DAY 8/26/21   Elvis Gamboa APRN   Insulin Pen Needle 31G X 8 MM misc Use up to 4 (four) times daily with insulin as directed. 7/12/21   Brandon Martel MD   Lancets (accu-chek soft touch) lancets As directed 10/18/21   Marty, BEBE Luu   lisinopril (PRINIVIL,ZESTRIL) 20 MG tablet TAKE 1 TABLET BY MOUTH EVERY DAY 11/29/21   Marty, BEBE Luu   LORazepam (ATIVAN) 0.5 MG tablet TAKE 1 TABLET BY MOUTH EVERY 8 HOURS. 2/3/22   Marty, BEBE Luu   meclizine (ANTIVERT) 25 MG tablet Take 1 tablet by mouth 3 (Three) Times a Day As Needed for dizziness. 12/14/17   Evon Hernandez APRN   methocarbamol (ROBAXIN) 500 MG tablet TAKE 1 TABLET BY MOUTH 2 (TWO) TIMES A DAY AS NEEDED FOR MUSCLE SPASMS. 12/16/21   Kimberly Ferguson APRN   metoclopramide (REGLAN) 10 MG tablet TAKE 1 TABLET BY MOUTH 4 (FOUR) TIMES A DAY AS NEEDED (NAUSEA). 12/16/21   Marcela Lawson APRN   metoprolol succinate XL (TOPROL-XL) 50 MG 24 hr tablet TAKE 1 TABLET BY MOUTH DAILY. DOSE INCREASED 5/24/21 12/16/21   Kimberly Ferguson APRN   mirtazapine (REMERON) 30 MG tablet TAKE 1 TABLET BY MOUTH EVERY DAY AT NIGHT 12/16/21   Kimberly Ferguson APRN   naloxone (NARCAN) 0.4 MG/ML injection Infuse 0.5 mL into a venous catheter Every 5 (Five) Minutes As Needed for Opioid Reversal. 3/13/21   Nick Faye MD   nitroglycerin (NITROSTAT) 0.4 MG SL tablet Place 1 tablet under the tongue Every 5 (Five) Minutes As Needed for Chest Pain. Take no more than 3 doses in 15 minutes. 4/29/21   Ferguson, BEBE Luu   ofloxacin (FLOXIN) 0.3 % otic solution Administer 5 drops to the right ear 2 (Two) Times a Day. 9/9/21   Ferguson, BEBE Luu   ondansetron (ZOFRAN) 8 MG tablet TAKE 1  "TABLET BY MOUTH EVERY 8 (EIGHT) HOURS AS NEEDED FOR NAUSEA OR VOMITING. 21   Ferguson, BEBE Luu   pantoprazole (PROTONIX) 20 MG EC tablet Take 1 tablet by mouth Daily. 21   Marcela Lawson APRN   Syringe/Needle, Disp, (Luer Lock Safety Syringes) 25G X 5/8\" 3 ML misc 1 each Daily. 1 Q28 DAYS 21   Marty, BEBE Luu   tamsulosin (FLOMAX) 0.4 MG capsule 24 hr capsule Take 1 capsule by mouth Daily. 21   Brandon Martel MD   venlafaxine XR (EFFEXOR-XR) 150 MG 24 hr capsule TAKE 1 CAPSULE BY MOUTH TWICE A DAY 21   Marty, BEBE Luu   vitamin D (ERGOCALCIFEROL) 1.25 MG (14454 UT) capsule capsule TAKE ONE CAPSULE BY MOUTH EVERY . 21   Ferguson, BEBE Luu       Objective:    Temp:  [98.4 °F (36.9 °C)] 98.4 °F (36.9 °C)  Heart Rate:  [66-74] 68  Resp:  [18-20] 18  BP: (123-163)/(58-71) 134/60    Neuro Exam:   AAOX3 follow multi step command, Track objects all directions. No visual filed cut . Pupils equal and reactive. No clear facial droop. No nystagmus. Normal language fluency, repetition and comprehension. Power: Move all extremities without limitation. No drift, leg lags.  Did good finger nose finger. Normal sensory examination. Didn't evaluate gait at this time.  Time: 13:15 CST  Person Administering Scale: Bashar A Mohsen, MD    1a  Level of consciousness: 0=alert; keenly responsive   1b. LOC questions:  0=Performs both tasks correctly   1c. LOC commands: 0=Performs both tasks correctly   2.  Best Gaze: 0=normal   3.  Visual: 0=No visual loss   4. Facial Palsy: 0=Normal symmetric movement   5a.  Motor left arm: 0=No drift, limb holds 90 (or 45) degrees for full 10 seconds   5b.  Motor right arm: 0=No drift, limb holds 90 (or 45) degrees for full 10 seconds   6a. motor left le=No drift, limb holds 90 (or 45) degrees for full 10 seconds   6b  Motor right le=No drift, limb holds 90 (or 45) degrees for full 10 seconds   7. Limb Ataxia: 0=Absent   8.  " Sensory: 0=Normal; no sensory loss   9. Best Language:  0=No aphasia, normal   10. Dysarthria: 0=Normal   11. Extinction and Inattention: 0=No abnormality    Total:   0       This was an audio and video enabled telemedicine encounter.       Hospital Meds:  Scheduled- sodium chloride, 500 mL, Intravenous, Once      Infusions-     PRNs- sodium chloride    Results Reviewed:  I have personally reviewed current lab, radiology, and data and agree with results.    Radiology Results  Results for orders placed during the hospital encounter of 05/17/21    Adult Transthoracic Echo Limited W/ Cont if Necessary Per Protocol    Interpretation Summary  · The study is technically difficult for diagnosis. The quality of the study is limited due to patient body habitus and lung disease.  · Estimated left ventricular EF = 67% Left ventricular ejection fraction appears to be 66 - 70%. Left ventricular systolic function is normal.  · Left ventricular diastolic function is consistent with (grade I) impaired relaxation.  · Estimated right ventricular systolic pressure from tricuspid regurgitation is normal (<35 mmHg).    Results for orders placed during the hospital encounter of 02/05/22    CT Angiogram Neck    Narrative  CT ANGIOGRAPHY HEAD AND NECK WITH INTRAVENOUS CONTRAST    CLINICAL HISTORY:  62 years Female,Acute Stroke    COMPARISON: Head CT without IV contrast dated 2/5/2022    TECHNIQUE: Images of the head were obtained before and after  intravenous administration of 50 mL Omnipaque 350 utilizing the  digital subtraction multiphase CT angiography protocol. Axial as  well as coronal and sagittal reformatted images were obtained.  Digital subtraction 3-dimensional reconstructed images were  created at a separate workstation.  This examination was  performed according to our departmental dose optimization program  which includes automated exposure control, adjustment of the MA  and kV according to patient size, and/or use of  iterative  reconstruction technique.    FINDINGS:  Variant aortic arch anatomy with the left common carotid artery  arising from the brachiocephalic artery. The bilateral common  carotid arteries are widely patent, without focal stenosis.  Plaque about the bilateral carotid bifurcation and proximal  internal carotid arteries produces less than 50% stenosis  bilaterally. The mid and distal internal carotid arteries  continue to the skull base without focal stenosis.    The proximal external carotid arteries are widely patent.    The vertebral arteries arise from their respective subclavian  arteries. The bilateral vertebral arteries are well-opacified  throughout their course and are codominant in their supply of the  basilar artery.    There is plaque along the bilateral intracranial internal carotid  arteries, with less than 50% associated stenosis. The A1 and M1  segments are unremarkable in appearance.    Normal appearance to the basilar artery. The P1 segments are  unremarkable in appearance.    No aneurysm seen.    The prevertebral soft tissues are unremarkable. The visualized  lung apices are clear.    Impression  1. Plaque about the carotid bifurcations and proximal internal  carotid artery with mild, less than 50% associated stenosis.  2. Patent bilateral vertebral arteries.  3. No large vessel intracranial arterial stenosis, occlusion, or  aneurysm.    Electronically signed by:  Natalio Frank MD  2/5/2022 1:07 PM [facility]  Workstation: [id number]        Lab Results  Lab Results   Lab Value Date/Time    CHOL 151 08/11/2021 0827    HDL 52 08/11/2021 0827    LDL 85 08/11/2021 0827    TRIG 72 08/11/2021 0827     Results from last 7 days   Lab Units 02/05/22  1139   WBC 10*3/mm3 13.04*   HEMOGLOBIN g/dL 12.0   MCV fL 86.7   PLATELETS 10*3/mm3 419   NEUTROPHIL % % 58.9   LYMPHOCYTE % % 29.3   EOSINOPHIL % % 3.6   IMM GRAN % % 0.5   NEUTROS ABS 10*3/mm3 7.69*   LYMPHS ABS 10*3/mm3 3.82*   EOS ABS 10*3/mm3 0.47*   IMMATURE  GRANS (ABS) 10*3/mm3 0.06*     Results from last 7 days   Lab Units 02/05/22  1139   SODIUM mmol/L 140   POTASSIUM mmol/L 4.1   CHLORIDE mmol/L 103   CO2 mmol/L 28.0   BUN mg/dL 21   CREATININE mg/dL 1.20*   GLUCOSE mg/dL 76   CALCIUM mg/dL 9.9       Assessment:  TIA.  Rule out giant cell arterities.    Plan:  1. MRI of the brain and stat CTA  brain and neck.  2. Echocardiogram.  3. Hemoglobin A-1 C and lipid profile.  4. Physical therapy occupational therapy and speech therapy.  6. Aspirin 325 mg daily and Lipitor 40 mg daily, after initial head CT show no bleed.  7. GI and DVT prophylaxis.  8. Stat head CT for any neurological changes.  9. Frequent Killian checks every 4 to 6 hours and vital sign check.  10. Stroke Risk  factor modification to the primary care team(HTN,DM).  11.Sed Rate.    Time: 13:15 CST  Person Administering Scale: Bashar A Mohsen, MD        This was an audio and video enabled telemedicine encounter.       Electronically signed by Bashar A Mohsen, MD, 02/05/22, 1:15 PM CST.

## 2022-02-06 LAB
ANION GAP SERPL CALCULATED.3IONS-SCNC: 8 MMOL/L (ref 5–15)
BASOPHILS # BLD AUTO: 0.05 10*3/MM3 (ref 0–0.2)
BASOPHILS NFR BLD AUTO: 0.5 % (ref 0–1.5)
BUN SERPL-MCNC: 18 MG/DL (ref 8–23)
BUN/CREAT SERPL: 16.8 (ref 7–25)
CALCIUM SPEC-SCNC: 9.6 MG/DL (ref 8.6–10.5)
CHLORIDE SERPL-SCNC: 103 MMOL/L (ref 98–107)
CO2 SERPL-SCNC: 28 MMOL/L (ref 22–29)
CREAT SERPL-MCNC: 1.07 MG/DL (ref 0.57–1)
DEPRECATED RDW RBC AUTO: 41.5 FL (ref 37–54)
EOSINOPHIL # BLD AUTO: 0.38 10*3/MM3 (ref 0–0.4)
EOSINOPHIL NFR BLD AUTO: 3.9 % (ref 0.3–6.2)
ERYTHROCYTE [DISTWIDTH] IN BLOOD BY AUTOMATED COUNT: 13.4 % (ref 12.3–15.4)
GFR SERPL CREATININE-BSD FRML MDRD: 52 ML/MIN/1.73
GLUCOSE BLDC GLUCOMTR-MCNC: 139 MG/DL (ref 70–130)
GLUCOSE BLDC GLUCOMTR-MCNC: 165 MG/DL (ref 70–130)
GLUCOSE SERPL-MCNC: 182 MG/DL (ref 65–99)
HCT VFR BLD AUTO: 35.8 % (ref 34–46.6)
HGB BLD-MCNC: 12 G/DL (ref 12–15.9)
IMM GRANULOCYTES # BLD AUTO: 0.04 10*3/MM3 (ref 0–0.05)
IMM GRANULOCYTES NFR BLD AUTO: 0.4 % (ref 0–0.5)
LYMPHOCYTES # BLD AUTO: 2.8 10*3/MM3 (ref 0.7–3.1)
LYMPHOCYTES NFR BLD AUTO: 28.9 % (ref 19.6–45.3)
MCH RBC QN AUTO: 28.9 PG (ref 26.6–33)
MCHC RBC AUTO-ENTMCNC: 33.5 G/DL (ref 31.5–35.7)
MCV RBC AUTO: 86.3 FL (ref 79–97)
MONOCYTES # BLD AUTO: 0.62 10*3/MM3 (ref 0.1–0.9)
MONOCYTES NFR BLD AUTO: 6.4 % (ref 5–12)
NEUTROPHILS NFR BLD AUTO: 5.8 10*3/MM3 (ref 1.7–7)
NEUTROPHILS NFR BLD AUTO: 59.9 % (ref 42.7–76)
NRBC BLD AUTO-RTO: 0 /100 WBC (ref 0–0.2)
PLATELET # BLD AUTO: 370 10*3/MM3 (ref 140–450)
PMV BLD AUTO: 9.5 FL (ref 6–12)
POTASSIUM SERPL-SCNC: 4.3 MMOL/L (ref 3.5–5.2)
RBC # BLD AUTO: 4.15 10*6/MM3 (ref 3.77–5.28)
SODIUM SERPL-SCNC: 139 MMOL/L (ref 136–145)
WBC NRBC COR # BLD: 9.69 10*3/MM3 (ref 3.4–10.8)

## 2022-02-06 PROCEDURE — G0378 HOSPITAL OBSERVATION PER HR: HCPCS

## 2022-02-06 PROCEDURE — 85025 COMPLETE CBC W/AUTO DIFF WBC: CPT | Performed by: HOSPITALIST

## 2022-02-06 PROCEDURE — 80048 BASIC METABOLIC PNL TOTAL CA: CPT | Performed by: HOSPITALIST

## 2022-02-06 PROCEDURE — 63710000001 ONDANSETRON PER 8 MG: Performed by: HOSPITALIST

## 2022-02-06 PROCEDURE — 82962 GLUCOSE BLOOD TEST: CPT

## 2022-02-06 PROCEDURE — 63710000001 INSULIN REGULAR HUMAN PER 5 UNITS: Performed by: HOSPITALIST

## 2022-02-06 PROCEDURE — 63710000001 INSULIN DETEMIR PER 5 UNITS: Performed by: HOSPITALIST

## 2022-02-06 RX ORDER — ARIPIPRAZOLE 15 MG/1
15 TABLET ORAL DAILY
Status: DISCONTINUED | OUTPATIENT
Start: 2022-02-06 | End: 2022-02-07 | Stop reason: HOSPADM

## 2022-02-06 RX ORDER — FUROSEMIDE 40 MG/1
40 TABLET ORAL DAILY
Status: DISCONTINUED | OUTPATIENT
Start: 2022-02-07 | End: 2022-02-07 | Stop reason: HOSPADM

## 2022-02-06 RX ADMIN — GABAPENTIN 400 MG: 400 CAPSULE ORAL at 16:49

## 2022-02-06 RX ADMIN — VENLAFAXINE HYDROCHLORIDE 150 MG: 75 CAPSULE, EXTENDED RELEASE ORAL at 22:18

## 2022-02-06 RX ADMIN — GABAPENTIN 400 MG: 400 CAPSULE ORAL at 22:19

## 2022-02-06 RX ADMIN — ARIPIPRAZOLE 15 MG: 15 TABLET ORAL at 14:31

## 2022-02-06 RX ADMIN — INSULIN DETEMIR 40 UNITS: 100 INJECTION, SOLUTION SUBCUTANEOUS at 22:22

## 2022-02-06 RX ADMIN — ONDANSETRON HYDROCHLORIDE 8 MG: 4 TABLET, FILM COATED ORAL at 09:06

## 2022-02-06 RX ADMIN — ONDANSETRON HYDROCHLORIDE 8 MG: 4 TABLET, FILM COATED ORAL at 03:31

## 2022-02-06 RX ADMIN — ASPIRIN 325 MG: 325 TABLET, COATED ORAL at 09:09

## 2022-02-06 RX ADMIN — MECLIZINE HYDROCHLORIDE 25 MG: 25 TABLET ORAL at 18:43

## 2022-02-06 RX ADMIN — ATORVASTATIN CALCIUM 80 MG: 40 TABLET, FILM COATED ORAL at 09:09

## 2022-02-06 RX ADMIN — MECLIZINE HYDROCHLORIDE 25 MG: 25 TABLET ORAL at 09:18

## 2022-02-06 RX ADMIN — CLOPIDOGREL BISULFATE 75 MG: 75 TABLET, FILM COATED ORAL at 09:09

## 2022-02-06 RX ADMIN — INSULIN DETEMIR 40 UNITS: 100 INJECTION, SOLUTION SUBCUTANEOUS at 10:10

## 2022-02-06 RX ADMIN — FOLIC ACID 1 MG: 1 TABLET ORAL at 09:09

## 2022-02-06 RX ADMIN — SODIUM CHLORIDE, PRESERVATIVE FREE 10 ML: 5 INJECTION INTRAVENOUS at 10:51

## 2022-02-06 RX ADMIN — Medication 1 TABLET: at 09:09

## 2022-02-06 RX ADMIN — GABAPENTIN 400 MG: 400 CAPSULE ORAL at 09:09

## 2022-02-06 RX ADMIN — VENLAFAXINE HYDROCHLORIDE 150 MG: 75 CAPSULE, EXTENDED RELEASE ORAL at 10:22

## 2022-02-06 RX ADMIN — SODIUM CHLORIDE, PRESERVATIVE FREE 10 ML: 5 INJECTION INTRAVENOUS at 21:00

## 2022-02-06 RX ADMIN — MIRTAZAPINE 30 MG: 15 TABLET, FILM COATED ORAL at 22:19

## 2022-02-06 RX ADMIN — PANTOPRAZOLE 20 MG: 20 TABLET, DELAYED RELEASE ORAL at 10:00

## 2022-02-06 RX ADMIN — HUMAN INSULIN 3 UNITS: 100 INJECTION, SOLUTION SUBCUTANEOUS at 12:00

## 2022-02-06 NOTE — ED NOTES
Pt complaining of nausea, she has PRN order for Zofran and it was given .     Yenny Rogel RN  02/06/22 0979

## 2022-02-06 NOTE — ED NOTES
Pt complains of right heel pain.  Removed sock and Assessed right foot and heel.  Right foot reddened and elevated with a pillow at this time     Peyton Arevalo RN  02/06/22 4810

## 2022-02-06 NOTE — ED NOTES
Pt provided bathing supplies, Comb, Toothbrush, toothpaste, and mouth wash.      Wong Stark  02/06/22 [id number]

## 2022-02-06 NOTE — PROGRESS NOTES
[facility] Medicine Services  INPATIENT PROGRESS NOTE    Length of Stay: 0  Date of Admission: 2/5/2022  Primary Care Physician: Marty, BEBE Luu    Subjective   Chief Complaint: Left vision change  HPI: Patient states that her vision is not back to baseline, but it is some better than yesterday.  Overall she feels some better.    Review of Systems   Constitutional: Negative for appetite change, chills, fatigue, fever and unexpected weight change.   Eyes: Positive for visual disturbance.   Respiratory: Negative for cough, choking, chest tightness, shortness of breath and wheezing.    Cardiovascular: Negative for chest pain, palpitations and leg swelling.   Gastrointestinal: Negative for abdominal pain, blood in stool, constipation, diarrhea, nausea and vomiting.   Genitourinary: Negative for dysuria, flank pain and hematuria.   Neurological: Negative for dizziness, seizures, syncope, speech difficulty, weakness, light-headedness, numbness and headaches.   Hematological: Does not bruise/bleed easily.        All pertinent negatives and positives are as above. All other systems have been reviewed and are negative unless otherwise stated.     Objective    Temp:  [97.9 °F (36.6 °C)-98.6 °F (37 °C)] 98.1 °F (36.7 °C)  Heart Rate:  [] 86  Resp:  [16-18] 18  BP: ()/(44-87) 125/58    Physical Exam  Vitals reviewed.   Constitutional:       Appearance: She is well-developed.   HENT:      Head: Normocephalic and atraumatic.   Eyes:      Pupils: Pupils are equal, round, and reactive to light.      Comments: Decreased vision in the left eye   Cardiovascular:      Rate and Rhythm: Normal rate and regular rhythm.      Heart sounds: Normal heart sounds. No murmur heard.  No friction rub. No gallop.    Pulmonary:      Effort: Pulmonary effort is normal. No respiratory distress.      Breath sounds: Normal breath sounds. No wheezing or rales.   Chest:      Chest  wall: No tenderness.   Abdominal:      General: Bowel sounds are normal. There is no distension.      Palpations: Abdomen is soft.      Tenderness: There is no abdominal tenderness. There is no guarding.   Musculoskeletal:      Cervical back: Normal range of motion and neck supple.   Skin:     General: Skin is warm and dry.   Psychiatric:         Behavior: Behavior normal.         Thought Content: Thought content normal.             Results Review:  I have reviewed the labs, radiology results, and diagnostic studies.    Laboratory Data:   Results from last 7 days   Lab Units 02/06/22  0707 02/05/22  1139   SODIUM mmol/L 139 140   POTASSIUM mmol/L 4.3 4.1   CHLORIDE mmol/L 103 103   CO2 mmol/L 28.0 28.0   BUN mg/dL 18 21   CREATININE mg/dL 1.07* 1.20*   GLUCOSE mg/dL 182* 76   CALCIUM mg/dL 9.6 9.9   BILIRUBIN mg/dL  --  0.2   ALK PHOS U/L  --  132*   ALT (SGPT) U/L  --  15   AST (SGOT) U/L  --  12   ANION GAP mmol/L 8.0 9.0     Estimated Creatinine Clearance: 70.5 mL/min (A) (by C-G formula based on SCr of 1.07 mg/dL (H)).          Results from last 7 days   Lab Units 02/06/22  0707 02/05/22  1139   WBC 10*3/mm3 9.69 13.04*   HEMOGLOBIN g/dL 12.0 12.0   HEMATOCRIT % 35.8 35.7   PLATELETS 10*3/mm3 370 419     Results from last 7 days   Lab Units 02/05/22  1139   INR  0.95       Culture Data:   No results found for: BLOODCX  No results found for: URINECX  No results found for: RESPCX  No results found for: WOUNDCX  No results found for: STOOLCX  No components found for: BODYFLD    Radiology Data:   Imaging Results (Last 24 Hours)     ** No results found for the last 24 hours. **          I have reviewed the patient's current medications.     Assessment/Plan     Active Hospital Problems    Diagnosis    • TIA (transient ischemic attack)        Plan:    1.  TIA: Seen by neurology.  MRI unable to be done due to bladder stimulator.  Continue aspirin and Lipitor.  2.  Diabetes: Placed on Levemir and sliding scale.  3.   Hypertension: Patient states that her blood pressure runs fairly low at home.  Will hold antihypertensives for now.  4.  Coronary artery disease: Continue home medication.  5.  DVT prophylaxis: SCDs.          The patient was evaluated during the global COVID-19 pandemic, and the diagnosis was suspected/considered upon their initial presentation.  Evaluation, treatment, and testing were consistent with current guidelines for patients who present with complaints or symptoms that may be related to COVID-19.    I confirmed that the patient's Advance Care Plan is present, code status is documented, or surrogate decision maker is listed in the patient's medical record.       Discharge Planning: I expect patient to be discharged to home in 1-2 days.        This document has been electronically signed by Mahin Esteves MD on February 6, 2022 15:32 CST

## 2022-02-07 ENCOUNTER — APPOINTMENT (OUTPATIENT)
Dept: CARDIOLOGY | Facility: HOSPITAL | Age: 62
End: 2022-02-07

## 2022-02-07 ENCOUNTER — APPOINTMENT (OUTPATIENT)
Dept: CT IMAGING | Facility: HOSPITAL | Age: 62
End: 2022-02-07

## 2022-02-07 ENCOUNTER — READMISSION MANAGEMENT (OUTPATIENT)
Dept: CALL CENTER | Facility: HOSPITAL | Age: 62
End: 2022-02-07

## 2022-02-07 VITALS
TEMPERATURE: 98.6 F | BODY MASS INDEX: 38.76 KG/M2 | SYSTOLIC BLOOD PRESSURE: 129 MMHG | WEIGHT: 255.73 LBS | OXYGEN SATURATION: 94 % | RESPIRATION RATE: 20 BRPM | HEART RATE: 71 BPM | HEIGHT: 68 IN | DIASTOLIC BLOOD PRESSURE: 63 MMHG

## 2022-02-07 LAB
ANION GAP SERPL CALCULATED.3IONS-SCNC: 6 MMOL/L (ref 5–15)
BASOPHILS # BLD AUTO: 0.06 10*3/MM3 (ref 0–0.2)
BASOPHILS NFR BLD AUTO: 0.7 % (ref 0–1.5)
BH CV ECHO MEAS - AO MAX PG (FULL): 3.6 MMHG
BH CV ECHO MEAS - AO MAX PG: 9 MMHG
BH CV ECHO MEAS - AO MEAN PG (FULL): 2 MMHG
BH CV ECHO MEAS - AO MEAN PG: 5 MMHG
BH CV ECHO MEAS - AO V2 MAX: 150 CM/SEC
BH CV ECHO MEAS - AO V2 MEAN: 103 CM/SEC
BH CV ECHO MEAS - AO V2 VTI: 32.8 CM
BH CV ECHO MEAS - AVA(I,A): 2.5 CM^2
BH CV ECHO MEAS - AVA(I,D): 2.5 CM^2
BH CV ECHO MEAS - AVA(V,A): 2.4 CM^2
BH CV ECHO MEAS - AVA(V,D): 2.4 CM^2
BH CV ECHO MEAS - BSA(HAYCOCK): 2.4 M^2
BH CV ECHO MEAS - BSA: 2.2 M^2
BH CV ECHO MEAS - BZI_BMI: 39.9 KILOGRAMS/M^2
BH CV ECHO MEAS - BZI_METRIC_HEIGHT: 170.2 CM
BH CV ECHO MEAS - BZI_METRIC_WEIGHT: 115.7 KG
BH CV ECHO MEAS - EDV(CUBED): 61.6 ML
BH CV ECHO MEAS - EDV(MOD-SP4): 26.6 ML
BH CV ECHO MEAS - EDV(TEICH): 67.9 ML
BH CV ECHO MEAS - EF(CUBED): 75.3 %
BH CV ECHO MEAS - EF(MOD-SP4): 74.6 %
BH CV ECHO MEAS - EF(TEICH): 67.8 %
BH CV ECHO MEAS - ESV(CUBED): 15.3 ML
BH CV ECHO MEAS - ESV(MOD-SP4): 6.8 ML
BH CV ECHO MEAS - ESV(TEICH): 21.9 ML
BH CV ECHO MEAS - FS: 37.2 %
BH CV ECHO MEAS - IVS/LVPW: 1.1
BH CV ECHO MEAS - IVSD: 1.6 CM
BH CV ECHO MEAS - LA DIMENSION: 4.1 CM
BH CV ECHO MEAS - LV DIASTOLIC VOL/BSA (35-75): 11.9 ML/M^2
BH CV ECHO MEAS - LV MASS(C)D: 234.7 GRAMS
BH CV ECHO MEAS - LV MASS(C)DI: 104.7 GRAMS/M^2
BH CV ECHO MEAS - LV MAX PG: 5.4 MMHG
BH CV ECHO MEAS - LV MEAN PG: 3 MMHG
BH CV ECHO MEAS - LV SYSTOLIC VOL/BSA (12-30): 3 ML/M^2
BH CV ECHO MEAS - LV V1 MAX: 116 CM/SEC
BH CV ECHO MEAS - LV V1 MEAN: 84 CM/SEC
BH CV ECHO MEAS - LV V1 VTI: 25.7 CM
BH CV ECHO MEAS - LVIDD: 4 CM
BH CV ECHO MEAS - LVIDS: 2.5 CM
BH CV ECHO MEAS - LVLD AP4: 7.3 CM
BH CV ECHO MEAS - LVLS AP4: 6.8 CM
BH CV ECHO MEAS - LVOT AREA (M): 3.1 CM^2
BH CV ECHO MEAS - LVOT AREA: 3.1 CM^2
BH CV ECHO MEAS - LVOT DIAM: 2 CM
BH CV ECHO MEAS - LVPWD: 1.5 CM
BH CV ECHO MEAS - MR MAX PG: 39.7 MMHG
BH CV ECHO MEAS - MR MAX VEL: 315 CM/SEC
BH CV ECHO MEAS - MV A MAX VEL: 79.9 CM/SEC
BH CV ECHO MEAS - MV DEC SLOPE: 359 CM/SEC^2
BH CV ECHO MEAS - MV E MAX VEL: 73.4 CM/SEC
BH CV ECHO MEAS - MV E/A: 0.92
BH CV ECHO MEAS - MV P1/2T MAX VEL: 81.4 CM/SEC
BH CV ECHO MEAS - MV P1/2T: 66.4 MSEC
BH CV ECHO MEAS - MVA P1/2T LCG: 2.7 CM^2
BH CV ECHO MEAS - MVA(P1/2T): 3.3 CM^2
BH CV ECHO MEAS - PA MAX PG (FULL): 1 MMHG
BH CV ECHO MEAS - PA MAX PG: 5 MMHG
BH CV ECHO MEAS - PA V2 MAX: 112 CM/SEC
BH CV ECHO MEAS - RAP SYSTOLE: 5 MMHG
BH CV ECHO MEAS - RV MAX PG: 4 MMHG
BH CV ECHO MEAS - RV MEAN PG: 2 MMHG
BH CV ECHO MEAS - RV V1 MAX: 99.8 CM/SEC
BH CV ECHO MEAS - RV V1 MEAN: 66.9 CM/SEC
BH CV ECHO MEAS - RV V1 VTI: 17.8 CM
BH CV ECHO MEAS - RVSP: 29 MMHG
BH CV ECHO MEAS - SI(CUBED): 20.7 ML/M^2
BH CV ECHO MEAS - SI(LVOT): 36 ML/M^2
BH CV ECHO MEAS - SI(MOD-SP4): 8.8 ML/M^2
BH CV ECHO MEAS - SI(TEICH): 20.5 ML/M^2
BH CV ECHO MEAS - SV(CUBED): 46.4 ML
BH CV ECHO MEAS - SV(LVOT): 80.7 ML
BH CV ECHO MEAS - SV(MOD-SP4): 19.8 ML
BH CV ECHO MEAS - SV(TEICH): 46.1 ML
BH CV ECHO MEAS - TR MAX VEL: 245 CM/SEC
BUN SERPL-MCNC: 20 MG/DL (ref 8–23)
BUN/CREAT SERPL: 16.5 (ref 7–25)
CALCIUM SPEC-SCNC: 9.9 MG/DL (ref 8.6–10.5)
CHLORIDE SERPL-SCNC: 103 MMOL/L (ref 98–107)
CHOLEST SERPL-MCNC: 147 MG/DL (ref 0–200)
CO2 SERPL-SCNC: 28 MMOL/L (ref 22–29)
CREAT SERPL-MCNC: 1.21 MG/DL (ref 0.57–1)
DEPRECATED RDW RBC AUTO: 40.6 FL (ref 37–54)
EOSINOPHIL # BLD AUTO: 0.36 10*3/MM3 (ref 0–0.4)
EOSINOPHIL NFR BLD AUTO: 3.9 % (ref 0.3–6.2)
ERYTHROCYTE [DISTWIDTH] IN BLOOD BY AUTOMATED COUNT: 13.1 % (ref 12.3–15.4)
GFR SERPL CREATININE-BSD FRML MDRD: 45 ML/MIN/1.73
GLUCOSE BLDC GLUCOMTR-MCNC: 191 MG/DL (ref 70–130)
GLUCOSE BLDC GLUCOMTR-MCNC: 206 MG/DL (ref 70–130)
GLUCOSE BLDC GLUCOMTR-MCNC: 230 MG/DL (ref 70–130)
GLUCOSE BLDC GLUCOMTR-MCNC: 247 MG/DL (ref 70–130)
GLUCOSE BLDC GLUCOMTR-MCNC: 321 MG/DL (ref 70–130)
GLUCOSE SERPL-MCNC: 215 MG/DL (ref 65–99)
HBA1C MFR BLD: 10.9 % (ref 4.8–5.6)
HCT VFR BLD AUTO: 34.8 % (ref 34–46.6)
HDLC SERPL-MCNC: 35 MG/DL (ref 40–60)
HGB BLD-MCNC: 11.7 G/DL (ref 12–15.9)
IMM GRANULOCYTES # BLD AUTO: 0.03 10*3/MM3 (ref 0–0.05)
IMM GRANULOCYTES NFR BLD AUTO: 0.3 % (ref 0–0.5)
LDLC SERPL CALC-MCNC: 87 MG/DL (ref 0–100)
LDLC/HDLC SERPL: 2.38 {RATIO}
LV EF 2D ECHO EST: 63 %
LYMPHOCYTES # BLD AUTO: 2.48 10*3/MM3 (ref 0.7–3.1)
LYMPHOCYTES NFR BLD AUTO: 27 % (ref 19.6–45.3)
MCH RBC QN AUTO: 29 PG (ref 26.6–33)
MCHC RBC AUTO-ENTMCNC: 33.6 G/DL (ref 31.5–35.7)
MCV RBC AUTO: 86.1 FL (ref 79–97)
MONOCYTES # BLD AUTO: 0.72 10*3/MM3 (ref 0.1–0.9)
MONOCYTES NFR BLD AUTO: 7.8 % (ref 5–12)
NEUTROPHILS NFR BLD AUTO: 5.53 10*3/MM3 (ref 1.7–7)
NEUTROPHILS NFR BLD AUTO: 60.3 % (ref 42.7–76)
NRBC BLD AUTO-RTO: 0 /100 WBC (ref 0–0.2)
PLATELET # BLD AUTO: 361 10*3/MM3 (ref 140–450)
PMV BLD AUTO: 9.4 FL (ref 6–12)
POTASSIUM SERPL-SCNC: 4.8 MMOL/L (ref 3.5–5.2)
RBC # BLD AUTO: 4.04 10*6/MM3 (ref 3.77–5.28)
SODIUM SERPL-SCNC: 137 MMOL/L (ref 136–145)
TRIGL SERPL-MCNC: 143 MG/DL (ref 0–150)
VLDLC SERPL-MCNC: 25 MG/DL (ref 5–40)
WBC NRBC COR # BLD: 9.18 10*3/MM3 (ref 3.4–10.8)

## 2022-02-07 PROCEDURE — 70450 CT HEAD/BRAIN W/O DYE: CPT

## 2022-02-07 PROCEDURE — 36415 COLL VENOUS BLD VENIPUNCTURE: CPT | Performed by: HOSPITALIST

## 2022-02-07 PROCEDURE — 99213 OFFICE O/P EST LOW 20 MIN: CPT | Performed by: NURSE PRACTITIONER

## 2022-02-07 PROCEDURE — 85025 COMPLETE CBC W/AUTO DIFF WBC: CPT | Performed by: HOSPITALIST

## 2022-02-07 PROCEDURE — 63710000001 INSULIN DETEMIR PER 5 UNITS: Performed by: HOSPITALIST

## 2022-02-07 PROCEDURE — 80048 BASIC METABOLIC PNL TOTAL CA: CPT | Performed by: HOSPITALIST

## 2022-02-07 PROCEDURE — 25010000002 PERFLUTREN (DEFINITY) 8.476 MG IN SODIUM CHLORIDE (PF) 0.9 % 10 ML INJECTION: Performed by: HOSPITALIST

## 2022-02-07 PROCEDURE — 80061 LIPID PANEL: CPT | Performed by: NURSE PRACTITIONER

## 2022-02-07 PROCEDURE — G0378 HOSPITAL OBSERVATION PER HR: HCPCS

## 2022-02-07 PROCEDURE — 83036 HEMOGLOBIN GLYCOSYLATED A1C: CPT | Performed by: NURSE PRACTITIONER

## 2022-02-07 PROCEDURE — 93306 TTE W/DOPPLER COMPLETE: CPT | Performed by: INTERNAL MEDICINE

## 2022-02-07 PROCEDURE — 82962 GLUCOSE BLOOD TEST: CPT

## 2022-02-07 PROCEDURE — 63710000001 INSULIN ASPART PER 5 UNITS: Performed by: HOSPITALIST

## 2022-02-07 PROCEDURE — 93306 TTE W/DOPPLER COMPLETE: CPT

## 2022-02-07 RX ORDER — HYDROCODONE BITARTRATE AND ACETAMINOPHEN 7.5; 325 MG/1; MG/1
1 TABLET ORAL EVERY 6 HOURS PRN
Status: DISCONTINUED | OUTPATIENT
Start: 2022-02-07 | End: 2022-02-07 | Stop reason: HOSPADM

## 2022-02-07 RX ORDER — ASPIRIN 325 MG
325 TABLET, DELAYED RELEASE (ENTERIC COATED) ORAL DAILY
Qty: 30 TABLET | Refills: 0 | Status: SHIPPED | OUTPATIENT
Start: 2022-02-08

## 2022-02-07 RX ADMIN — CLOPIDOGREL BISULFATE 75 MG: 75 TABLET, FILM COATED ORAL at 08:57

## 2022-02-07 RX ADMIN — FUROSEMIDE 40 MG: 40 TABLET ORAL at 08:58

## 2022-02-07 RX ADMIN — VENLAFAXINE HYDROCHLORIDE 150 MG: 75 CAPSULE, EXTENDED RELEASE ORAL at 08:58

## 2022-02-07 RX ADMIN — INSULIN ASPART 10 UNITS: 100 INJECTION, SOLUTION INTRAVENOUS; SUBCUTANEOUS at 11:30

## 2022-02-07 RX ADMIN — ATORVASTATIN CALCIUM 80 MG: 40 TABLET, FILM COATED ORAL at 08:58

## 2022-02-07 RX ADMIN — PERFLUTREN 1.5 ML: 6.52 INJECTION, SUSPENSION INTRAVENOUS at 09:56

## 2022-02-07 RX ADMIN — GABAPENTIN 400 MG: 400 CAPSULE ORAL at 08:57

## 2022-02-07 RX ADMIN — INSULIN ASPART 5 UNITS: 100 INJECTION, SOLUTION INTRAVENOUS; SUBCUTANEOUS at 18:29

## 2022-02-07 RX ADMIN — GABAPENTIN 400 MG: 400 CAPSULE ORAL at 16:17

## 2022-02-07 RX ADMIN — INSULIN ASPART 5 UNITS: 100 INJECTION, SOLUTION INTRAVENOUS; SUBCUTANEOUS at 07:30

## 2022-02-07 RX ADMIN — INSULIN DETEMIR 40 UNITS: 100 INJECTION, SOLUTION SUBCUTANEOUS at 11:25

## 2022-02-07 RX ADMIN — FOLIC ACID 1 MG: 1 TABLET ORAL at 08:58

## 2022-02-07 RX ADMIN — SODIUM CHLORIDE, PRESERVATIVE FREE 10 ML: 5 INJECTION INTRAVENOUS at 09:00

## 2022-02-07 RX ADMIN — HYDROCODONE BITARTRATE AND ACETAMINOPHEN 1 TABLET: 7.5; 325 TABLET ORAL at 08:58

## 2022-02-07 RX ADMIN — ASPIRIN 325 MG: 325 TABLET, COATED ORAL at 08:57

## 2022-02-07 RX ADMIN — Medication 1 TABLET: at 07:00

## 2022-02-07 RX ADMIN — PANTOPRAZOLE 20 MG: 20 TABLET, DELAYED RELEASE ORAL at 08:57

## 2022-02-07 RX ADMIN — ARIPIPRAZOLE 15 MG: 15 TABLET ORAL at 08:57

## 2022-02-07 NOTE — ED NOTES
"Pt O2 sats in the 70s.  In to assess pt.  Pt placed on 2 L per NC.  O2 sat increased to 97%.  Pt complained of \"terrible headache\" and \"I don't feel well at all\".  Bilateral hands reddened and swollen.  Ring removed from right ring finger and placed in specimen cup and in patient belongings.  hospitalist paged to update on pt status and to request to come and  reassess pt at this time     Peyton Arevalo RN  02/06/22 2002    "

## 2022-02-07 NOTE — PROGRESS NOTES
Stroke Progress Note       Chief Complaint: Left eye blurriness    Subjective     HPI: Pt is a 62-yr-old right-handed white female with known diagnosis of hypertension, hyperlipidemia, CAD, DM2, and anxiety who presented after an episode of left eye blurriness and left headache. She stated the past two weeks she has had a headache every other day. This morning she is A&OX4, strengths are equal 5/5, denies numbness, visual field is intact, and states she still has some left eye blurriness but it has improved. States left headache has improved to 2/10.      Review of Systems   Constitutional: Negative.    Eyes: Positive for visual disturbance.   Respiratory: Negative.    Cardiovascular: Negative.    Gastrointestinal: Negative.    Genitourinary: Negative.    Musculoskeletal: Negative.    Neurological: Positive for headaches.   Psychiatric/Behavioral: Negative.         Objective      Temp:  [96.9 °F (36.1 °C)-98.2 °F (36.8 °C)] 98.2 °F (36.8 °C)  Heart Rate:  [76-97] 82  Resp:  [18-20] 20  BP: (109-144)/(47-66) 123/58    Neurological Exam  Mental Status  Awake and alert. Oriented to person, place, time and situation. Speech is normal. Language is fluent with no aphasia. Attention and concentration are normal.    Cranial Nerves  CN II: Visual acuity is normal. Blurriness. Visual fields full to confrontation.  CN III, IV, VI: Extraocular movements intact bilaterally. Normal lids and orbits bilaterally. Pupils equal round and reactive to light bilaterally.  CN V: Facial sensation is normal.  CN VII: Full and symmetric facial movement.  CN IX, X: Palate elevates symmetrically. Normal gag reflex.  CN XI: Shoulder shrug strength is normal.  CN XII: Tongue midline without atrophy or fasciculations.    Motor  Normal muscle bulk throughout. No fasciculations present. Strength is 5/5 throughout all four extremities.    Sensory  Sensation is intact to light touch, pinprick, vibration and proprioception in all four  extremities.    Reflexes  Not assessed.    Coordination  Finger-to-nose, rapid alternating movements and heel-to-shin normal bilaterally without dysmetria.    Gait  Not assessed.      Physical Exam  Vitals and nursing note reviewed.   Constitutional:       General: She is awake.      Appearance: She is obese.   HENT:      Head: Normocephalic and atraumatic.   Eyes:      General: Lids are normal.      Extraocular Movements: Extraocular movements intact.      Pupils: Pupils are equal, round, and reactive to light.   Cardiovascular:      Rate and Rhythm: Normal rate and regular rhythm.   Pulmonary:      Effort: Pulmonary effort is normal.   Musculoskeletal:         General: Normal range of motion.   Skin:     General: Skin is warm and dry.   Neurological:      General: No focal deficit present.      Mental Status: She is alert and oriented to person, place, and time.      Coordination: Coordination is intact.      Deep Tendon Reflexes: Strength normal.   Psychiatric:         Mood and Affect: Mood normal.         Speech: Speech normal.         Results Review:    I reviewed the patient's new clinical results.    Lab Results (last 24 hours)     Procedure Component Value Units Date/Time    POC Glucose Once [[id number]]  (Abnormal) Collected: 02/07/22 0646    Specimen: Blood Updated: 02/07/22 0658     Glucose 191 mg/dL      Comment: RN NotifiedOperator: [id number] AGUEDA [id number] ID: [id number]       Lipid Panel [[id number]]  (Abnormal) Collected: 02/07/22 0335    Specimen: Blood Updated: 02/07/22 0657     Total Cholesterol 147 mg/dL      Triglycerides 143 mg/dL      HDL Cholesterol 35 mg/dL      LDL Cholesterol  87 mg/dL      VLDL Cholesterol 25 mg/dL      LDL/HDL Ratio 2.38    Narrative:      Cholesterol Reference Ranges  (U.S. Department of Health and Human Services ATP III Classifications)    Desirable          <200 mg/dL  Borderline High    200-239 mg/dL  High Risk          >240 mg/dL      Triglyceride  Reference Ranges  (U.S. Department of Health and Human Services ATP III Classifications)    Normal           <150 mg/dL  Borderline High  150-199 mg/dL  High             200-499 mg/dL  Very High        >500 mg/dL    HDL Reference Ranges  (U.S. Department of Health and Human Services ATP III Classifications)    Low     <40 mg/dl (major risk factor for CHD)  High    >60 mg/dl ('negative' risk factor for CHD)        LDL Reference Ranges  (U.S. Department of Health and Human Services ATP III Classifications)    Optimal          <100 mg/dL  Near Optimal     100-129 mg/dL  Borderline High  130-159 mg/dL  High             160-189 mg/dL  Very High        >189 mg/dL    Hemoglobin A1c [[id number]]  (Abnormal) Collected: 02/07/22 0335    Specimen: Blood Updated: 02/07/22 0653     Hemoglobin A1C 10.90 %     Narrative:      Hemoglobin A1C Ranges:    Increased Risk for Diabetes  5.7% to 6.4%  Diabetes                     >= 6.5%  Diabetic Goal                < 7.0%    Basic Metabolic Panel [[id number]]  (Abnormal) Collected: 02/07/22 0335    Specimen: Blood Updated: 02/07/22 0453     Glucose 215 mg/dL      BUN 20 mg/dL      Creatinine 1.21 mg/dL      Sodium 137 mmol/L      Potassium 4.8 mmol/L      Chloride 103 mmol/L      CO2 28.0 mmol/L      Calcium 9.9 mg/dL      eGFR Non African Amer 45 mL/min/1.73      BUN/Creatinine Ratio 16.5     Anion Gap 6.0 mmol/L     Narrative:      GFR Normal >60  Chronic Kidney Disease <60  Kidney Failure <15      CBC & Differential [[id number]]  (Abnormal) Collected: 02/07/22 0335    Specimen: Blood Updated: 02/07/22 0426    Narrative:      The following orders were created for panel order CBC & Differential.  Procedure                               Abnormality         Status                     ---------                               -----------         ------                     CBC Auto Differential[[id number]]        Abnormal            Final result                 Please view results for these  tests on the individual orders.    CBC Auto Differential [[id number]]  (Abnormal) Collected: 02/07/22 0335    Specimen: Blood Updated: 02/07/22 0426     WBC 9.18 10*3/mm3      RBC 4.04 10*6/mm3      Hemoglobin 11.7 g/dL      Hematocrit 34.8 %      MCV 86.1 fL      MCH 29.0 pg      MCHC 33.6 g/dL      RDW 13.1 %      RDW-SD 40.6 fl      MPV 9.4 fL      Platelets 361 10*3/mm3      Neutrophil % 60.3 %      Lymphocyte % 27.0 %      Monocyte % 7.8 %      Eosinophil % 3.9 %      Basophil % 0.7 %      Immature Grans % 0.3 %      Neutrophils, Absolute 5.53 10*3/mm3      Lymphocytes, Absolute 2.48 10*3/mm3      Monocytes, Absolute 0.72 10*3/mm3      Eosinophils, Absolute 0.36 10*3/mm3      Basophils, Absolute 0.06 10*3/mm3      Immature Grans, Absolute 0.03 10*3/mm3      nRBC 0.0 /100 WBC     POC Glucose Once [[id number]]  (Abnormal) Collected: 02/06/22 2221    Specimen: Blood Updated: 02/06/22 2236     Glucose 139 mg/dL      Comment: RN NotifiedOperator: [id number] HUSSAIN CANDELARIOMeter ID: [id number]           CT Angiogram Neck    Result Date: 2/5/2022  1. Plaque about the carotid bifurcations and proximal internal carotid artery with mild, less than 50% associated stenosis. 2. Patent bilateral vertebral arteries. 3. No large vessel intracranial arterial stenosis, occlusion, or aneurysm.  Electronically signed by:  Natalio Frank MD  2/5/2022 1:07 PM CST Workstation: [id number]    XR Chest 1 View    Result Date: 2/5/2022  No evidence of active disease.  Electronically signed by:  Naveed Taylor MD  2/5/2022 11:18 AM CST Workstation: [id number]    CT Head Without Contrast Stroke Protocol    Result Date: 2/5/2022  1. Normal brain for age. There are no acute changes. Electronically signed by:  Naveed Taylor MD  2/5/2022 11:39 AM CST Workstation: [id number]    CT Angiogram Head w AI Analysis of LVO    Result Date: 2/5/2022  1. Plaque about the carotid bifurcations and proximal internal carotid artery with mild, less than 50% associated  stenosis. 2. Patent bilateral vertebral arteries. 3. No large vessel intracranial arterial stenosis, occlusion, or aneurysm.  Electronically signed by:  Natalio Frank MD  2/5/2022 1:07 PM [facility] Workstation: [id number]    Results for orders placed during the hospital encounter of 05/17/21    Adult Transthoracic Echo Limited W/ Cont if Necessary Per Protocol    Interpretation Summary  · The study is technically difficult for diagnosis. The quality of the study is limited due to patient body habitus and lung disease.  · Estimated left ventricular EF = 67% Left ventricular ejection fraction appears to be 66 - 70%. Left ventricular systolic function is normal.  · Left ventricular diastolic function is consistent with (grade I) impaired relaxation.  · Estimated right ventricular systolic pressure from tricuspid regurgitation is normal (<35 mmHg).        Assessment/Plan     Assessment/Plan: Pt is a 62-yr-old right-handed white female with known diagnosis of hypertension, hyperlipidemia, CAD, DM2, and anxiety who presented after an episode of left eye blurriness and left headache. She stated the past two weeks she has had a headache every other day. This morning she is A&OX4, strengths are equal 5/5, denies numbness, visual field is intact, and states she still has some left eye blurriness but it has improved. States left headache has improved to 2/10.  1. Left eye blurriness and headache- Improved, states mild blurriness in left eye.Likely d/t diabetes rather than vascular. Instructed her to follow up with ophthalmologist and endocrinology. CTA shows some plaque <50%.  Will get a repeat head CT since she cannot have a MRI. Echo pending. Continue DAPT with aspirin 325 mg and Plavix 75 mg for 3 months then decrease aspirin back to 81 mg/day and Lipitor 80 mg/day for secondary stroke prevention. Reviewed stroke booklet, prevention, and stroke s/s and to call 911. Pt verbalized understanding.   2. Hypertension- Normal BP goals,  instructed on the importance of daily BP monitoring and control to reduce stroke risk.  3. Hyperlipidemia- LDL is 87 goal 70. Instructed on diet modifications to reduce LDL and continue Lipitor 80 mg/day.  4. DM2- A1C is 10.9, instructed on the importance of glucose control to reduce stroke risk. Inpatient dietary consult ordered for diabetes education.  5. Activity- Okay to work with PT/OT.  4. Diet- ADA heart-healthy diet.   Case was discussed with pt, Dr. Simpson, Hospitalist team, and nursing. Neurology will sign off.          Patient Active Problem List   Diagnosis   • 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, with long-term current use of insulin ([facility])   • Closed nondisplaced fracture of fifth left metatarsal bone   • MAURICIO (generalized anxiety disorder)   • Depression, major, recurrent, moderate ([facility])   • GERD without esophagitis   • Long term prescription opiate use   • Mixed hyperlipidemia   • Vitamin D deficiency   • Seasonal allergic rhinitis   • Restrictive lung disease secondary to obesity   • Adult body mass index 37.0-37.9   • Snoring   • Class 3 severe obesity due to excess calories without serious comorbidity with body mass index (BMI) of 40.0 to 44.9 in adult ([facility])   • (HFpEF) heart failure with preserved ejection fraction ([facility])   • Type 2 diabetes mellitus with hyperglycemia, with long-term current use of insulin ([facility])   • Cyanocobalamin deficiency   • CAD (coronary artery disease)   • Hypertension   • Meniere's disease   • Gastroparesis   • Otitis media with effusion, left   • Pulmonary hypertension ([facility])   • Displaced fracture of fifth metatarsal bone, left foot, subsequent encounter for fracture with nonunion   • Pes cavus   • Primary osteoarthritis involving multiple joints   • Generalized anxiety disorder   • Chronic right-sided low back pain with right-sided sciatica   • Chest pain   • Thrombocytosis   • Leukocytosis   • Iron deficiency   • Adverse effect of iron   •  Hindfoot varus, acquired, left   • Chest pain due to myocardial ischemia   • Nonrheumatic tricuspid valve regurgitation   • Iron deficiency anemia due to chronic blood loss   • Malaise and fatigue   • Nonunion of subtalar arthrodesis   • Ankle arthritis   • Cellulitis of left lower extremity   • Urinary retention   • Acute bacterial endocarditis   • Staphylococcus aureus bacteremia   • Infection due to Acinetobacter species   • Endocarditis   • Left foot infection   • Uncontrolled hypertension   • Occlusion and stenosis of bilateral carotid arteries   • S/P BKA (below knee amputation) unilateral, left (HCC)   • Phantom pain after amputation of lower extremity ([facility])   • S/P CABG (coronary artery bypass graft)   • Acute colitis   • Severe malnutrition ([facility])   • Sepsis ([facility])   • TIA (transient ischemic attack)           BEBE Baig  02/07/22  09:11 CST

## 2022-02-07 NOTE — CONSULTS
Nutrition Services    Patient Name:  Ariana Martinez  YOB: 1960  MRN: [id number]  Admit Date:  2/5/2022  REceived consult for Diet education from APRN.  Pt admitted with a TIA.  Hx of DM and HTN.  HgbA1c elevated at 10.90 indicating uncontrolled DM.  Pt reports that she does try to follow a diabetic meal plan.  She sees the APRN in Dr. Onofre's office.  She has an appt next week.  Rd discussed Basic ADA guidelines along with Carb counting.  Discussed the importance of SMBS. Encouraged her to take these readings to APRN.  Diet copies and contact number provided.  Pt receptive.  Will monitor POC.  Pt to be discharged today.      Electronically signed by:  Lisa French RD  02/07/22 13:54 CST

## 2022-02-08 ENCOUNTER — TRANSITIONAL CARE MANAGEMENT TELEPHONE ENCOUNTER (OUTPATIENT)
Dept: CALL CENTER | Facility: HOSPITAL | Age: 62
End: 2022-02-08

## 2022-02-08 NOTE — DISCHARGE SUMMARY
[facility] Medicine Services  DISCHARGE SUMMARY       Date of Admission: 2/5/2022  Date of Discharge:  2/7/2022  Primary Care Physician: Kimberly Ferguson APRN    Presenting Problem/History of Present Illness:  TIA (transient ischemic attack) [G45.9]  Visual changes [H53.9]       Final Discharge Diagnoses:  Active Hospital Problems    Diagnosis    • TIA (transient ischemic attack)        Consults:   Consults     Date and Time Order Name Status Description    2/5/2022 10:14 AM Inpatient Neurology Consult Stroke            Pertinent Test Results:   Lab Results (most recent)     Procedure Component Value Units Date/Time    POC Glucose Once [[id number]]  (Abnormal) Collected: 02/07/22 1127    Specimen: Blood Updated: 02/07/22 1145     Glucose 321 mg/dL      Comment: RN NotifiedOperator: [id number] CHING MCALLISTERLISSETTENEYMeter ID: [id number]       POC Glucose Once [[id number]]  (Abnormal) Collected: 02/07/22 0856    Specimen: Blood Updated: 02/07/22 0913     Glucose 206 mg/dL      Comment: RN NotifiedOperator: [id number] MAX LITTLENISMeter ID: [id number]       Lipid Panel [[id number]]  (Abnormal) Collected: 02/07/22 0335    Specimen: Blood Updated: 02/07/22 0657     Total Cholesterol 147 mg/dL      Triglycerides 143 mg/dL      HDL Cholesterol 35 mg/dL      LDL Cholesterol  87 mg/dL      VLDL Cholesterol 25 mg/dL      LDL/HDL Ratio 2.38    Narrative:      Cholesterol Reference Ranges  (U.S. Department of Health and Human Services ATP III Classifications)    Desirable          <200 mg/dL  Borderline High    200-239 mg/dL  High Risk          >240 mg/dL      Triglyceride Reference Ranges  (U.S. Department of Health and Human Services ATP III Classifications)    Normal           <150 mg/dL  Borderline High  150-199 mg/dL  High             200-499 mg/dL  Very High        >500 mg/dL    HDL Reference Ranges  (U.S. Department of Health and Human Services ATP III  Classifications)    Low     <40 mg/dl (major risk factor for CHD)  High    >60 mg/dl ('negative' risk factor for CHD)        LDL Reference Ranges  (U.S. Department of Health and Human Services ATP III Classifications)    Optimal          <100 mg/dL  Near Optimal     100-129 mg/dL  Borderline High  130-159 mg/dL  High             160-189 mg/dL  Very High        >189 mg/dL    Hemoglobin A1c [[id number]]  (Abnormal) Collected: 02/07/22 0335    Specimen: Blood Updated: 02/07/22 0653     Hemoglobin A1C 10.90 %     Narrative:      Hemoglobin A1C Ranges:    Increased Risk for Diabetes  5.7% to 6.4%  Diabetes                     >= 6.5%  Diabetic Goal                < 7.0%    Basic Metabolic Panel [[id number]]  (Abnormal) Collected: 02/07/22 0335    Specimen: Blood Updated: 02/07/22 0453     Glucose 215 mg/dL      BUN 20 mg/dL      Creatinine 1.21 mg/dL      Sodium 137 mmol/L      Potassium 4.8 mmol/L      Chloride 103 mmol/L      CO2 28.0 mmol/L      Calcium 9.9 mg/dL      eGFR Non African Amer 45 mL/min/1.73      BUN/Creatinine Ratio 16.5     Anion Gap 6.0 mmol/L     Narrative:      GFR Normal >60  Chronic Kidney Disease <60  Kidney Failure <15      CBC & Differential [[id number]]  (Abnormal) Collected: 02/07/22 0335    Specimen: Blood Updated: 02/07/22 0426    Narrative:      The following orders were created for panel order CBC & Differential.  Procedure                               Abnormality         Status                     ---------                               -----------         ------                     CBC Auto Differential[[id number]]        Abnormal            Final result                 Please view results for these tests on the individual orders.    CBC Auto Differential [[id number]]  (Abnormal) Collected: 02/07/22 0335    Specimen: Blood Updated: 02/07/22 0426     WBC 9.18 10*3/mm3      RBC 4.04 10*6/mm3      Hemoglobin 11.7 g/dL      Hematocrit 34.8 %      MCV 86.1 fL      MCH 29.0 pg      MCHC 33.6 g/dL       RDW 13.1 %      RDW-SD 40.6 fl      MPV 9.4 fL      Platelets 361 10*3/mm3      Neutrophil % 60.3 %      Lymphocyte % 27.0 %      Monocyte % 7.8 %      Eosinophil % 3.9 %      Basophil % 0.7 %      Immature Grans % 0.3 %      Neutrophils, Absolute 5.53 10*3/mm3      Lymphocytes, Absolute 2.48 10*3/mm3      Monocytes, Absolute 0.72 10*3/mm3      Eosinophils, Absolute 0.36 10*3/mm3      Basophils, Absolute 0.06 10*3/mm3      Immature Grans, Absolute 0.03 10*3/mm3      nRBC 0.0 /100 WBC     Basic Metabolic Panel [[id number]]  (Abnormal) Collected: 02/06/22 0707    Specimen: Blood Updated: 02/06/22 0745     Glucose 182 mg/dL      BUN 18 mg/dL      Creatinine 1.07 mg/dL      Sodium 139 mmol/L      Potassium 4.3 mmol/L      Chloride 103 mmol/L      CO2 28.0 mmol/L      Calcium 9.6 mg/dL      eGFR Non African Amer 52 mL/min/1.73      BUN/Creatinine Ratio 16.8     Anion Gap 8.0 mmol/L     Narrative:      GFR Normal >60  Chronic Kidney Disease <60  Kidney Failure <15      CBC & Differential [[id number]]  (Normal) Collected: 02/06/22 0707    Specimen: Blood Updated: 02/06/22 0721    Narrative:      The following orders were created for panel order CBC & Differential.  Procedure                               Abnormality         Status                     ---------                               -----------         ------                     CBC Auto Differential[[id number]]        Normal              Final result                 Please view results for these tests on the individual orders.    CBC Auto Differential [[id number]]  (Normal) Collected: 02/06/22 0707    Specimen: Blood Updated: 02/06/22 0721     WBC 9.69 10*3/mm3      RBC 4.15 10*6/mm3      Hemoglobin 12.0 g/dL      Hematocrit 35.8 %      MCV 86.3 fL      MCH 28.9 pg      MCHC 33.5 g/dL      RDW 13.4 %      RDW-SD 41.5 fl      MPV 9.5 fL      Platelets 370 10*3/mm3      Neutrophil % 59.9 %      Lymphocyte % 28.9 %      Monocyte % 6.4 %      Eosinophil % 3.9 %       Basophil % 0.5 %      Immature Grans % 0.4 %      Neutrophils, Absolute 5.80 10*3/mm3      Lymphocytes, Absolute 2.80 10*3/mm3      Monocytes, Absolute 0.62 10*3/mm3      Eosinophils, Absolute 0.38 10*3/mm3      Basophils, Absolute 0.05 10*3/mm3      Immature Grans, Absolute 0.04 10*3/mm3      nRBC 0.0 /100 WBC     Extra Tubes [[id number]] Collected: 02/05/22 1139    Specimen: Blood, Venous Line Updated: 02/05/22 1546    Narrative:      The following orders were created for panel order Extra Tubes.  Procedure                               Abnormality         Status                     ---------                               -----------         ------                     Suggs Top[[id number]]                                         Final result                 Please view results for these tests on the individual orders.    Gray Top [[id number]] Collected: 02/05/22 1139    Specimen: Blood Updated: 02/05/22 1546     Extra Tube Hold for add-ons.     Comment: Auto resulted.       COVID-19 and FLU A/B PCR - Swab, Nasopharynx [[id number]]  (Normal) Collected: 02/05/22 1448    Specimen: Swab from Nasopharynx Updated: 02/05/22 1511     COVID19 Not Detected     Influenza A PCR Not Detected     Influenza B PCR Not Detected    Narrative:      Fact sheet for providers: https://www.fda.gov/media/644916/download    Fact sheet for patients: https://www.fda.gov/media/489080/download    Test performed by PCR.    Sedimentation Rate [[id number]]  (Abnormal) Collected: 02/05/22 1139    Specimen: Blood Updated: 02/05/22 1357     Sed Rate 35 mm/hr     Extra Tubes [[id number]] Collected: 02/05/22 1139    Specimen: Blood, Venous Line Updated: 02/05/22 1245    Narrative:      The following orders were created for panel order Extra Tubes.  Procedure                               Abnormality         Status                     ---------                               -----------         ------                     [place] Blood Culture Kev...[[id number]]                       Final result                 Please view results for these tests on the individual orders.    [place] Blood Culture Bottle Set [[id number]] Collected: 02/05/22 1139    Specimen: Blood Updated: 02/05/22 1245     Extra Tube Hold for add-ons.     Comment: Auto resulted.       [place] Draw [[id number]] Collected: 02/05/22 1139    Specimen: Blood Updated: 02/05/22 1245    Narrative:      The following orders were created for panel order [place] Draw.  Procedure                               Abnormality         Status                     ---------                               -----------         ------                     Green Top (Gel)[[id number]]                                  Final result               Lavender Top[[id number]]                                     Final result               Gold Top - SST[[id number]]                                   Final result               Light Blue Top[[id number]]                                   Final result                 Please view results for these tests on the individual orders.    Light Blue Top [[id number]] Collected: 02/05/22 1139    Specimen: Blood Updated: 02/05/22 1245     Extra Tube hold for add-on     Comment: Auto resulted       Green Top (Gel) [[id number]] Collected: 02/05/22 1139    Specimen: Blood Updated: 02/05/22 1245     Extra Tube Hold for add-ons.     Comment: Auto resulted.       Lavender Top [[id number]] Collected: 02/05/22 1139    Specimen: Blood Updated: 02/05/22 1245     Extra Tube hold for add-on     Comment: Auto resulted       Gold Top - SST [[id number]] Collected: 02/05/22 1139    Specimen: Blood Updated: 02/05/22 1245     Extra Tube Hold for add-ons.     Comment: Auto resulted.       C-reactive Protein [[id number]]  (Normal) Collected: 02/05/22 1139    Specimen: Blood Updated: 02/05/22 1240     C-Reactive Protein <0.30 mg/dL     Protime-INR [[id number]]  (Normal) Collected: 02/05/22 1139    Specimen: Blood Updated: 02/05/22 1223      Protime 12.6 Seconds      INR 0.95    Narrative:      Therapeutic range for most indications is 2.0-3.0 INR,  or 2.5-3.5 for mechanical heart valves.    Comprehensive Metabolic Panel [[id number]]  (Abnormal) Collected: 02/05/22 1139    Specimen: Blood Updated: 02/05/22 1221     Glucose 76 mg/dL      BUN 21 mg/dL      Creatinine 1.20 mg/dL      Sodium 140 mmol/L      Potassium 4.1 mmol/L      Chloride 103 mmol/L      CO2 28.0 mmol/L      Calcium 9.9 mg/dL      Total Protein 6.8 g/dL      Albumin 4.10 g/dL      ALT (SGPT) 15 U/L      AST (SGOT) 12 U/L      Alkaline Phosphatase 132 U/L      Total Bilirubin 0.2 mg/dL      eGFR Non African Amer 46 mL/min/1.73      Globulin 2.7 gm/dL      A/G Ratio 1.5 g/dL      BUN/Creatinine Ratio 17.5     Anion Gap 9.0 mmol/L     Narrative:      GFR Normal >60  Chronic Kidney Disease <60  Kidney Failure <15          Imaging Results (Most Recent)     Procedure Component Value Units Date/Time    CT Head Without Contrast [[id number]] Collected: 02/07/22 1019     Updated: 02/07/22 1050    Narrative:      CT HEAD WITHOUT IV CONTRAST     CLINICAL STATEMENT: Stroke, follow up, G45.9 Transient cerebral  ischemic attack, unspecified H53.9 Unspecified visual disturbance    COMPARISON:  CT head dated 2/5/2022    This exam was performed according to our departmental  dose-optimization program which includes automated exposure  control, adjustment of the mA and/or kVp according to patient  size and/or use of iterative reconstruction technique where  applicable.    Findings: No acute intracranial hemorrhage, mass effect or  midline shift. No extra-axial fluid collections. Ventricles and  subarachnoid spaces are mildly dilated consistent with cerebral  atrophy. Visualized paranasal sinuses and the mastoid air cells  are clear. The skull is intact.      Impression:        No acute intracranial hemorrhage.     Electronically signed by:  Jevon Levy MD  2/7/2022 10:48 AM [facility]  Workstation: [id number]    CT  Angiogram Head w AI Analysis of LVO [[id number]] Collected: 02/05/22 1139     Updated: 02/05/22 1309    Narrative:      CT ANGIOGRAPHY HEAD AND NECK WITH INTRAVENOUS CONTRAST    CLINICAL HISTORY:  62 years Female,Acute Stroke    COMPARISON: Head CT without IV contrast dated 2/5/2022    TECHNIQUE: Images of the head were obtained before and after  intravenous administration of 50 mL Omnipaque 350 utilizing the  digital subtraction multiphase CT angiography protocol. Axial as  well as coronal and sagittal reformatted images were obtained.   Digital subtraction 3-dimensional reconstructed images were  created at a separate workstation.  This examination was  performed according to our departmental dose optimization program  which includes automated exposure control, adjustment of the MA  and kV according to patient size, and/or use of iterative  reconstruction technique.    FINDINGS:   Variant aortic arch anatomy with the left common carotid artery  arising from the brachiocephalic artery. The bilateral common  carotid arteries are widely patent, without focal stenosis.  Plaque about the bilateral carotid bifurcation and proximal  internal carotid arteries produces less than 50% stenosis  bilaterally. The mid and distal internal carotid arteries  continue to the skull base without focal stenosis.    The proximal external carotid arteries are widely patent.    The vertebral arteries arise from their respective subclavian  arteries. The bilateral vertebral arteries are well-opacified  throughout their course and are codominant in their supply of the  basilar artery.    There is plaque along the bilateral intracranial internal carotid  arteries, with less than 50% associated stenosis. The A1 and M1  segments are unremarkable in appearance.    Normal appearance to the basilar artery. The P1 segments are  unremarkable in appearance.    No aneurysm seen.    The prevertebral soft tissues are unremarkable. The visualized  lung  apices are clear.      Impression:      1. Plaque about the carotid bifurcations and proximal internal  carotid artery with mild, less than 50% associated stenosis.   2. Patent bilateral vertebral arteries.  3. No large vessel intracranial arterial stenosis, occlusion, or  aneurysm.      Electronically signed by:  Natalio Frank MD  2/5/2022 1:07 PM [facility]  Workstation: [id number]    CT Angiogram Neck [[id number]] Collected: 02/05/22 1139     Updated: 02/05/22 1309    Narrative:      CT ANGIOGRAPHY HEAD AND NECK WITH INTRAVENOUS CONTRAST    CLINICAL HISTORY:  62 years Female,Acute Stroke    COMPARISON: Head CT without IV contrast dated 2/5/2022    TECHNIQUE: Images of the head were obtained before and after  intravenous administration of 50 mL Omnipaque 350 utilizing the  digital subtraction multiphase CT angiography protocol. Axial as  well as coronal and sagittal reformatted images were obtained.   Digital subtraction 3-dimensional reconstructed images were  created at a separate workstation.  This examination was  performed according to our departmental dose optimization program  which includes automated exposure control, adjustment of the MA  and kV according to patient size, and/or use of iterative  reconstruction technique.    FINDINGS:   Variant aortic arch anatomy with the left common carotid artery  arising from the brachiocephalic artery. The bilateral common  carotid arteries are widely patent, without focal stenosis.  Plaque about the bilateral carotid bifurcation and proximal  internal carotid arteries produces less than 50% stenosis  bilaterally. The mid and distal internal carotid arteries  continue to the skull base without focal stenosis.    The proximal external carotid arteries are widely patent.    The vertebral arteries arise from their respective subclavian  arteries. The bilateral vertebral arteries are well-opacified  throughout their course and are codominant in their supply of the  basilar  artery.    There is plaque along the bilateral intracranial internal carotid  arteries, with less than 50% associated stenosis. The A1 and M1  segments are unremarkable in appearance.    Normal appearance to the basilar artery. The P1 segments are  unremarkable in appearance.    No aneurysm seen.    The prevertebral soft tissues are unremarkable. The visualized  lung apices are clear.      Impression:      1. Plaque about the carotid bifurcations and proximal internal  carotid artery with mild, less than 50% associated stenosis.   2. Patent bilateral vertebral arteries.  3. No large vessel intracranial arterial stenosis, occlusion, or  aneurysm.      Electronically signed by:  Natalio Frank MD  2/5/2022 1:07 PM CST  Workstation: [id number]    CT Head Without Contrast Stroke Protocol [[id number]] Collected: 02/05/22 1124     Updated: 02/05/22 1141    Narrative:      Noncontrast CT examination of the brain.    INDICATION: Stroke protocol follow-up       Technique: Axial 5 mm contiguous images with brain parenchymal  and bone windows    This exam was performed according to our departmental  dose-optimization program, which includes automated exposure  control, adjustment of the mA and/or kV according to patient size  and/or use of iterative reconstruction technique.    Prior relevant examination: None.    Brain parenchyma appears within normal limits. Ventricles are  within normal limits in size. No evidence of abnormal mass or  calcification is seen. No evidence of acute hemorrhage is noted.  Bony structures appear within normal limits and the mastoid air  cells and visualized paranasal sinuses are normally aerated.        Impression:      1. Normal brain for age. There are no acute changes.    Electronically signed by:  Naveed Taylor MD  2/5/2022 11:39 AM CST  Workstation: [id number]    XR Chest 1 View [[id number]] Collected: 02/05/22 1056     Updated: 02/05/22 1120    Narrative:      Chest x-ray single view.       CLINICAL  "INDICATION: Shortness of breath. Stroke protocol    COMPARISON: Chest July 2, 2021.    FINDINGS: Cardiac silhouette is normal in size. Pulmonary  vascularity is unremarkable.     Sternal sutures from prior cardiac surgery. Chronic appearing  elevation right diaphragm. Lungs are otherwise clear.  No focal infiltrate or consolidation.  No pleural effusion.  No  pneumothorax.      Impression:      No evidence of active disease.       Electronically signed by:  Naveed Taylor MD  2/5/2022 11:18 AM CST  Workstation: [id number]          Chief Complaint on Day of Discharge: Improving left visual changes    Hospital Course:  The patient is a 62 y.o. female who presented to [facility] with left-sided blurry vision.  Neurology was consulted and full stroke work-up was done.  Stroke work-up was negative and visual changes were felt to be secondary to diabetic retinopathy.  Blood pressure and glucose control were emphasized to patient and maximized.  She had improved vision during her hospital stay.  She will follow-up with primary care in a week and with ophthalmology in 2 weeks.    Condition on Discharge: Stable    Physical Exam on Discharge:  /63   Pulse 71   Temp 98.6 °F (37 °C) (Tympanic)   Resp 20   Ht 172.7 cm (67.99\")   Wt 116 kg (255 lb 11.7 oz)   SpO2 94%   BMI 38.89 kg/m²      Physical Exam  Vitals reviewed.   Constitutional:       Appearance: She is well-developed.   HENT:      Head: Normocephalic and atraumatic.   Eyes:      Pupils: Pupils are equal, round, and reactive to light.   Cardiovascular:      Rate and Rhythm: Normal rate and regular rhythm.      Heart sounds: Normal heart sounds. No murmur heard.  No friction rub. No gallop.    Pulmonary:      Effort: Pulmonary effort is normal. No respiratory distress.      Breath sounds: Normal breath sounds. No wheezing or rales.   Chest:      Chest wall: No tenderness.   Abdominal:      General: Bowel sounds are normal. There is no distension. "      Palpations: Abdomen is soft.      Tenderness: There is no abdominal tenderness. There is no guarding.   Musculoskeletal:      Cervical back: Normal range of motion and neck supple.   Skin:     General: Skin is warm and dry.   Psychiatric:         Behavior: Behavior normal.         Thought Content: Thought content normal.           Discharge Disposition:  Home or Self Care    Discharge Medications:     Discharge Medications      Changes to Medications      Instructions Start Date   aspirin  MG tablet  What changed:   · medication strength  · how much to take   325 mg, Oral, Daily   Start Date: February 8, 2022        Continue These Medications      Instructions Start Date   Accu-Chek Guide test strip  Generic drug: glucose blood   USE AS INSTRUCTED      accu-chek soft touch lancets   As directed      ARIPiprazole 15 MG tablet  Commonly known as: ABILIFY   TAKE 1 TABLET BY MOUTH EVERY DAY      atorvastatin 80 MG tablet  Commonly known as: LIPITOR   TAKE 1 TABLET BY MOUTH EVERY DAY      B-D ULTRAFINE III SHORT PEN 31G X 8 MM misc  Generic drug: Insulin Pen Needle   Use up to 4 (four) times daily with insulin as directed.      B-D ULTRAFINE III SHORT PEN 31G X 8 MM misc  Generic drug: Insulin Pen Needle   USE TO INJECT 5 TIMES A DAY      BASAGLAR KWIKPEN 100 UNIT/ML injection pen   60 Units, Subcutaneous, 2 Times Daily      BD [employer] misc   1 each, Does not apply, Take As Directed      Citracal/Vitamin D 250-200 MG-UNIT tablet  Generic drug: Calcium Citrate-Vitamin D   2 tablets, Oral, 2 Times Daily      clopidogrel 75 MG tablet  Commonly known as: PLAVIX   TAKE 1 TABLET BY MOUTH EVERY DAY      cyanocobalamin 1000 MCG/ML injection   1,000 mcg, Intramuscular, Every 28 Days      folic acid 1 MG tablet  Commonly known as: FOLVITE   1 mg, Oral, Daily      furosemide 40 MG tablet  Commonly known as: LASIX   TAKE 1 TABLET BY MOUTH EVERY DAY      gabapentin 400 MG capsule  Commonly known as:  "NEURONTIN   400 mg, Oral, 3 Times Daily      glucose monitor monitoring kit   1 each, Does not apply, Daily, accucheck eve meter, E11.9      hydroCHLOROthiazide 25 MG tablet  Commonly known as: HYDRODIURIL   TAKE 1 TABLET BY MOUTH EVERY DAY      HYDROcodone-acetaminophen 7.5-325 MG per tablet  Commonly known as: NORCO   1 tablet, Oral, Every 6 Hours PRN      lisinopril 20 MG tablet  Commonly known as: PRINIVIL,ZESTRIL   TAKE 1 TABLET BY MOUTH EVERY DAY      LORazepam 0.5 MG tablet  Commonly known as: ATIVAN   TAKE 1 TABLET BY MOUTH EVERY 8 HOURS.      Luer Lock Safety Syringes 25G X 5/8\" 3 ML misc  Generic drug: Syringe/Needle (Disp)   1 each, Does not apply, Daily, 1 Q28 DAYS      meclizine 25 MG tablet  Commonly known as: ANTIVERT   25 mg, Oral, 3 Times Daily PRN      methocarbamol 500 MG tablet  Commonly known as: ROBAXIN   500 mg, Oral, 2 Times Daily PRN      metoclopramide 10 MG tablet  Commonly known as: REGLAN   10 mg, Oral, 4 Times Daily PRN      metoprolol succinate XL 50 MG 24 hr tablet  Commonly known as: TOPROL-XL   50 mg, Oral, Daily, Dose increased 5/24/21      mirtazapine 30 MG tablet  Commonly known as: REMERON   TAKE 1 TABLET BY MOUTH EVERY DAY AT NIGHT      naloxone 0.4 MG/ML injection  Commonly known as: NARCAN   0.2 mg, Intravenous, Every 5 Minutes PRN      nitroglycerin 0.4 MG SL tablet  Commonly known as: NITROSTAT   0.4 mg, Sublingual, Every 5 Minutes PRN, Take no more than 3 doses in 15 minutes.      NovoLOG FlexPen 100 UNIT/ML solution pen-injector sc pen  Generic drug: insulin aspart   Inject 0 to 14 units under the skin into the appropriate area 3 (three) times daily before meals according to sliding scale on attached sheet.      ondansetron 8 MG tablet  Commonly known as: ZOFRAN   8 mg, Oral, Every 8 Hours PRN      ONE TOUCH ULTRA MINI w/Device kit   Patient will need the Accu-Check Avitio meter      pantoprazole 20 MG EC tablet  Commonly known as: PROTONIX   20 mg, Oral, Daily    "   tamsulosin 0.4 MG capsule 24 hr capsule  Commonly known as: FLOMAX   0.4 mg, Oral, Daily      venlafaxine  MG 24 hr capsule  Commonly known as: EFFEXOR-XR   TAKE 1 CAPSULE BY MOUTH TWICE A DAY      vitamin D 1.25 MG (75016 UT) capsule capsule  Commonly known as: ERGOCALCIFEROL   TAKE ONE CAPSULE BY MOUTH EVERY SUNDAY.         Stop These Medications    Glucagon Emergency 1 MG kit     ofloxacin 0.3 % otic solution  Commonly known as: FLOXIN            Discharge Diet:   Diet Instructions     Diet: Consistent Carbohydrate, Cardiac      Discharge Diet:  Consistent Carbohydrate  Cardiac             Activity at Discharge:   Activity Instructions     Activity as Tolerated              Follow-up Appointments:   Future Appointments   Date Time Provider Department Center   2/21/2022  8:00 AM Elvis Gamboa APRN [facility] END [facility]   2/22/2022  8:30 AM Kimberly Ferguson APRN MGW PC POW [facility]   3/22/2022 12:30 PM NURSE [facility] MAD OPI [facility]   3/22/2022  1:00 PM Teressa Hammond APRN [facility] ONC [facility]   7/5/2022  9:15 AM Bill Gibson MD [facility] CD [facility] None   8/1/2022 10:00 AM Marcela Lawson APRN [facility] GE [facility]     PCP 1 to 2 weeks  Ophthalmology 2 weeks            This document has been electronically signed by Mahin Esteves MD on February 7, 2022 18:19 CST      Time: 35 minutes

## 2022-02-08 NOTE — OUTREACH NOTE
Prep Survey      Responses   [facility] facility patient discharged from? [place]   Is LACE score < 7 ? No   Emergency Room discharge w/ pulse ox? No   Eligibility [facility]   Date of Admission 02/05/22   Date of Discharge 02/07/22   Discharge Disposition Home or Self Care   Discharge diagnosis TIA   Does the patient have one of the following disease processes/diagnoses(primary or secondary)? Stroke (TIA)   Does the patient have Home health ordered? No   Is there a DME ordered? No   Prep survey completed? Yes          Елена Garcia RN

## 2022-02-08 NOTE — OUTREACH NOTE
Call Center TCM Note      Responses   [facility] patient discharged from? [place]   Does the patient have one of the following disease processes/diagnoses(primary or secondary)? Stroke (TIA)   TCM attempt successful? Yes   Call start time 1054   Call end time 1059   Discharge diagnosis TIA   Meds reviewed with patient/caregiver? Yes   Is the patient having any side effects they believe may be caused by any medication additions or changes? No   Does the patient have all medications ordered at discharge? N/A   Prescription comments Only change was in ASA regimen   Is the patient taking all medications as directed (includes completed medication regime)? Yes   Comments regarding appointments Pt attempting to arrange for Opthamology appt today   Does the patient have a primary care provider?  Yes   Does the patient have an appointment with their PCP within 7 days of discharge? Greater than 7 days   Comments regarding PCP Pt has an appt on 2/22/22--declines to reschedule for earlier appt as  has an appt same day   What is preventing the patient from scheduling follow up appointments within 7 days of discharge? --  [Pt preference]   Nursing Interventions Verified appointment date/time/provider   Psychosocial issues? No   Does the patient require any assistance with activities of daily living such as eating, bathing, dressing, walking, etc.? No   Does the patient have any residual symptoms from stroke/TIA? Yes   Residual symptoms comments Spots in vision field on left side   Does the patient understand the diet ordered at discharge? Yes   Did the patient receive a copy of their discharge instructions? Yes   Nursing interventions Reviewed instructions with patient   What is the patient's perception of their health status since discharge? Same   Nursing interventions Nurse provided patient education   Is the patient able to teach back FAST for Stroke? Yes  [Encouraged to review AVS and directed to page on  stroke s/s for reinforcement]   Is the patient/caregiver able to teach back the risk factors for a stroke? High blood pressure-goal below 120/80,  Diabetes,  High Cholesterol,  History of TIAs  [Pt checks BP 3-4 x daily and reports it runs low]   Is the patient/caregiver able to teach back signs and symptoms related to disease process for when to call PCP? Yes   Is the patient/caregiver able to teach back signs and symptoms related to disease process for when to call 911? Yes   If the patient is a current smoker, are they able to teach back resources for cessation? Not a smoker   Is the patient/caregiver able to teach back the hierarchy of who to call/visit for symptoms/problems? PCP, Specialist, Home health nurse, Urgent Care, ED, 911 Yes   TCM call completed? Yes   Wrap up additional comments Pt had no questions or concerns today---          Juany Moore, RN    2/8/2022, 11:06 EST

## 2022-02-16 ENCOUNTER — READMISSION MANAGEMENT (OUTPATIENT)
Dept: CALL CENTER | Facility: HOSPITAL | Age: 62
End: 2022-02-16

## 2022-02-16 NOTE — OUTREACH NOTE
Stroke Week 2 Survey      Responses   [facility] patient discharged from? [place]   Does the patient have one of the following disease processes/diagnoses(primary or secondary)? Stroke (TIA)   Week 2 attempt successful? Yes   Call start time 1211   Call end time 1214   Discharge diagnosis TIA   Meds reviewed with patient/caregiver? Yes   Is the patient having any side effects they believe may be caused by any medication additions or changes? No   Does the patient have all medications ordered at discharge? Yes   Is the patient taking all medications as directed (includes completed medication regime)? Yes   Does the patient have a primary care provider?  Yes   Does the patient have an appointment with their PCP within 7 days of discharge? Yes   Has the patient kept scheduled appointments due by today? Yes   Psychosocial issues? No   Does the patient require any assistance with activities of daily living such as eating, bathing, dressing, walking, etc.? No   Does the patient have any residual symptoms from stroke/TIA? Yes   Residual symptoms comments visual -sees spots   Does the patient understand the diet ordered at discharge? Yes   Did the patient receive a copy of their discharge instructions? Yes   Nursing interventions Reviewed instructions with patient   What is the patient's perception of their health status since discharge? Same   Nursing interventions Nurse provided patient education   Is the patient able to teach back FAST for Stroke? Yes   Is the patient/caregiver able to teach back the risk factors for a stroke? High blood pressure-goal below 120/80,  Diabetes,  High Cholesterol,  History of TIAs   Is the patient/caregiver able to teach back signs and symptoms related to disease process for when to call PCP? Yes   Is the patient/caregiver able to teach back signs and symptoms related to disease process for when to call 911? Yes   If the patient is a current smoker, are they able to teach back  resources for cessation? Not a smoker   Is the patient/caregiver able to teach back the hierarchy of who to call/visit for symptoms/problems? PCP, Specialist, Home health nurse, Urgent Care, ED, 911 Yes   Week 2 call completed? Yes   Wrap up additional comments Pt had no questions or concerns today---          Anselmo Winn RN

## 2022-02-17 ENCOUNTER — TELEPHONE (OUTPATIENT)
Dept: FAMILY MEDICINE CLINIC | Facility: CLINIC | Age: 62
End: 2022-02-17

## 2022-02-17 DIAGNOSIS — M54.41 CHRONIC RIGHT-SIDED LOW BACK PAIN WITH RIGHT-SIDED SCIATICA: ICD-10-CM

## 2022-02-17 DIAGNOSIS — G89.29 CHRONIC RIGHT-SIDED LOW BACK PAIN WITH RIGHT-SIDED SCIATICA: ICD-10-CM

## 2022-02-17 DIAGNOSIS — G54.6 PHANTOM PAIN AFTER AMPUTATION OF LOWER EXTREMITY: Chronic | ICD-10-CM

## 2022-02-17 RX ORDER — HYDROCODONE BITARTRATE AND ACETAMINOPHEN 7.5; 325 MG/1; MG/1
1 TABLET ORAL EVERY 6 HOURS PRN
Qty: 120 TABLET | Refills: 0 | Status: SHIPPED | OUTPATIENT
Start: 2022-02-17 | End: 2022-03-14 | Stop reason: SDUPTHER

## 2022-02-17 RX ORDER — METOCLOPRAMIDE 10 MG/1
10 TABLET ORAL 4 TIMES DAILY PRN
Qty: 120 TABLET | Refills: 1 | Status: SHIPPED | OUTPATIENT
Start: 2022-02-17 | End: 2022-03-24

## 2022-02-17 NOTE — TELEPHONE ENCOUNTER
Patient seen in office every 3 months for chronic pain requiring opiate pain medication. Compliant with medication, visits with no adverse effects noted. LESTER and UDS current and appropriate. Patient called requesting scheduled refill at appropriate interval. Patient understands the risks associated with this controlled medication, including tolerance and addiction.  Patient also agrees to only obtain this medication from me, and not from a another provider, unless that provider is covering for me in my absence.  Patient also agrees to be compliant in dosing, and not self adjust the dose of medication.  A signed controlled substance agreement is on file, and the patient has received a controlled substance education sheet at this a previous visit.  The patient has also signed a consent for treatment with a controlled substance as per [facility] policy. LESTER was obtained.   Refill sent for hydrocodone.     This document has been electronically signed by BEBE Palomino on February 17, 2022 11:00 CST

## 2022-02-17 NOTE — TELEPHONE ENCOUNTER
Incoming Refill Request      Medication requested (name and dose):   HYDROcodone-acetaminophen (NORCO) 7.5-325 MG per tablet        Pharmacy where request should be sent:   [facility]     Additional details provided by patient:    Best call back number:     Does the patient have less than a 3 day supply:  [] Yes  [] No    Tiffanie Vaughan  02/17/22, 10:12 CST

## 2022-02-20 LAB
QT INTERVAL: 402 MS
QTC INTERVAL: 434 MS

## 2022-02-21 ENCOUNTER — OFFICE VISIT (OUTPATIENT)
Dept: ENDOCRINOLOGY | Facility: CLINIC | Age: 62
End: 2022-02-21

## 2022-02-21 VITALS
SYSTOLIC BLOOD PRESSURE: 126 MMHG | HEIGHT: 68 IN | WEIGHT: 255 LBS | DIASTOLIC BLOOD PRESSURE: 64 MMHG | BODY MASS INDEX: 38.65 KG/M2 | OXYGEN SATURATION: 100 % | HEART RATE: 68 BPM

## 2022-02-21 DIAGNOSIS — I10 PRIMARY HYPERTENSION: ICD-10-CM

## 2022-02-21 DIAGNOSIS — Z79.4 TYPE 2 DIABETES MELLITUS WITH HYPERGLYCEMIA, WITH LONG-TERM CURRENT USE OF INSULIN: ICD-10-CM

## 2022-02-21 DIAGNOSIS — E55.9 VITAMIN D DEFICIENCY: ICD-10-CM

## 2022-02-21 DIAGNOSIS — E78.2 MIXED HYPERLIPIDEMIA: Primary | ICD-10-CM

## 2022-02-21 DIAGNOSIS — E11.65 TYPE 2 DIABETES MELLITUS WITH HYPERGLYCEMIA, WITH LONG-TERM CURRENT USE OF INSULIN: ICD-10-CM

## 2022-02-21 PROCEDURE — 99214 OFFICE O/P EST MOD 30 MIN: CPT | Performed by: NURSE PRACTITIONER

## 2022-02-21 RX ORDER — INSULIN ASPART 100 [IU]/ML
30 INJECTION, SOLUTION INTRAVENOUS; SUBCUTANEOUS
Qty: 10 PEN | Refills: 11 | Status: SHIPPED | OUTPATIENT
Start: 2022-02-21 | End: 2022-03-25 | Stop reason: SDUPTHER

## 2022-02-21 NOTE — PROGRESS NOTES
"Chief Complaint  Diabetes (3 mo f/u )    Subjective          Ariana Martinez presents to [facility] ENDOCRINOLOGY  History of Present Illness     In office visit       Primary provider BEBE Villatoro       62 female presents for follow-up    Reason diabetes mellitus type 2    Diagnosed at age 26    Timing constant    Severity high    Quality not controlled    Lab Results   Component Value Date    HGBA1C 10.90 (H) 02/07/2022     Macrovascular complications CAD, 2 stents placed in February 2020      Microvascular complications neuropathy, left BKA     Current diabetes regimen    Insulin by injections and oral medication    Current glucose monitoring    Fingersticks    Checks 4 times daily    No longer on Dexcom due to financial      Review of Systems - General ROS: positive for  - fatigue               Objective   Vital Signs:   /64   Pulse 68   Ht 172.7 cm (67.99\")   Wt 116 kg (255 lb)   SpO2 100%   BMI 38.78 kg/m²     Physical Exam  Constitutional:       Appearance: Normal appearance.   Cardiovascular:      Rate and Rhythm: Regular rhythm.      Heart sounds: Normal heart sounds.   Pulmonary:      Breath sounds: Normal breath sounds.   Musculoskeletal:      Cervical back: Normal range of motion.   Neurological:      Mental Status: She is alert.        Result Review :   The following data was reviewed by: BEBE Prince on 02/21/2022:  Common labs    Common Labsle 2/5/22 2/5/22 2/6/22 2/6/22 2/7/22 2/7/22 2/7/22 2/7/22    1139 1139 0707 [id number] [id number] [id number] [id number]   Glucose  76  182 (A)  215 (A)     BUN  21  18  20     Creatinine  1.20 (A)  1.07 (A)  1.21 (A)     eGFR Non  Am  46 (A)  52 (A)  45 (A)     Sodium  140  139  137     Potassium  4.1  4.3  4.8     Chloride  103  103  103     Calcium  9.9  9.6  9.9     Albumin  4.10         Total Bilirubin  0.2         Alkaline Phosphatase  132 (A)         AST (SGOT)  12         ALT (SGPT)  15         WBC 13.04 (A)  " 9.69  9.18      Hemoglobin 12.0  12.0  11.7 (A)      Hematocrit 35.7  35.8  34.8      Platelets 419  370  361      Total Cholesterol        147   Triglycerides        143   HDL Cholesterol        35 (A)   LDL Cholesterol         87   Hemoglobin A1C       10.90 (A)    (A) Abnormal value                      Assessment and Plan    Diagnoses and all orders for this visit:    1. Mixed hyperlipidemia (Primary)  -     CBC & Differential; Future  -     Comprehensive Metabolic Panel; Future  -     Hemoglobin A1c; Future  -     Lipid Panel; Future  -     Microalbumin / Creatinine Urine Ratio - Urine, Clean Catch; Future  -     TSH; Future  -     Vitamin D 25 Hydroxy; Future    2. Vitamin D deficiency  -     CBC & Differential; Future  -     Comprehensive Metabolic Panel; Future  -     Hemoglobin A1c; Future  -     Lipid Panel; Future  -     Microalbumin / Creatinine Urine Ratio - Urine, Clean Catch; Future  -     TSH; Future  -     Vitamin D 25 Hydroxy; Future    3. Type 2 diabetes mellitus with hyperglycemia, with long-term current use of insulin (HCC)  -     CBC & Differential; Future  -     Comprehensive Metabolic Panel; Future  -     Hemoglobin A1c; Future  -     Lipid Panel; Future  -     Microalbumin / Creatinine Urine Ratio - Urine, Clean Catch; Future  -     TSH; Future  -     Vitamin D 25 Hydroxy; Future    4. Primary hypertension  -     CBC & Differential; Future  -     Comprehensive Metabolic Panel; Future  -     Hemoglobin A1c; Future  -     Lipid Panel; Future  -     Microalbumin / Creatinine Urine Ratio - Urine, Clean Catch; Future  -     TSH; Future  -     Vitamin D 25 Hydroxy; Future    Other orders  -     insulin aspart (NovoLOG FlexPen) 100 UNIT/ML solution pen-injector sc pen; Inject 30 Units under the skin into the appropriate area as directed 3 (Three) Times a Day With Meals.  Dispense: 10 pen; Refill: 11               Glycemic Management     Diabetes mellitus not controlled         Lab Results    Component Value Date    HGBA1C 10.90 (H) 02/07/2022             Dexcom sensor ---off due to finances          Basaglar taking 60 units bid ---- decrease to 55 units BID              Novolog taking        5 up to 14 units for each meal -  Increase up to 10 up to 20 units tid before meals     + sliding scale                ==================     Jardiance-- stopped       Metformin--stopped       bydureon - stopped     Victoza stopped due to side effects         januvia 100 mg daily  -stopped               Lipid Management        Taking lipitor 80- mg one at night               LDL needs to be 70 or less           Total Cholesterol   Date Value Ref Range Status   02/07/2022 147 0 - 200 mg/dL Final     Triglycerides   Date Value Ref Range Status   02/07/2022 143 0 - 150 mg/dL Final     HDL Cholesterol   Date Value Ref Range Status   02/07/2022 35 (L) 40 - 60 mg/dL Final     LDL Cholesterol    Date Value Ref Range Status   02/07/2022 87 0 - 100 mg/dL Final           Blood Pressure Management: Control of blood pressure  on ACEi     stopped the lasix         Microvascular Complication Monitoring:     Neurontin 400 mg po TID -- moderate relief she is taking        Component      Latest Ref Rng & Units 11/12/2020 11/12/2020           8:26 AM  8:26 AM   Protein/Creatinine Ratio      0.0 - 200.0 mg/G Crea 203.8 (H)     Creatinine, Urine      mg/dL 196.3 196.3   Total Protein, Urine      mg/dL 40.0     Microalbumin/Creatinine Ratio      mg/g   39.7   Microalbumin, Urine      mg/dL   7.8         intermittetly takes reglan for gastroparesis     states eye exam was March 2021, no DR      Left BKA     Follows with podiatry                    Other Diabetes Related Aspects     TSH abnormal from July to Sept 2014     TSH in the 5 to 7 range         She still does not want treatment and guidelines state we do not have to treat until the TSH is 7 or higher        Lab Results   Component Value Date    TSH 1.410 08/11/2021        Vitamin D deficiency        Taking vitamin D supplement           Component      Latest Ref Rng & Units 8/11/2021   25 Hydroxy, Vitamin D      ng/ml 32.6                  Lab Results   Component Value Date     [id number] 08/11/2021                        Follow Up   No follow-ups on file.  Patient was given instructions and counseling regarding her condition or for health maintenance advice. Please see specific information pulled into the AVS if appropriate.         This document has been electronically signed by BEBE Prince on February 21, 2022 08:21 CST.

## 2022-02-24 ENCOUNTER — READMISSION MANAGEMENT (OUTPATIENT)
Dept: CALL CENTER | Facility: HOSPITAL | Age: 62
End: 2022-02-24

## 2022-02-24 NOTE — OUTREACH NOTE
Stroke Week 3 Survey      Responses   [facility] patient discharged from? [place]   Does the patient have one of the following disease processes/diagnoses(primary or secondary)? Stroke (TIA)   Week 3 attempt successful? Yes   Call start time 1330   Call end time 1331   Discharge diagnosis TIA   Is the patient taking all medications as directed (includes completed medication regime)? Yes   Has the patient kept scheduled appointments due by today? Yes   Has home health visited the patient within 72 hours of discharge? N/A   Psychosocial issues? No   Does the patient require any assistance with activities of daily living such as eating, bathing, dressing, walking, etc.? No   Does the patient have any residual symptoms from stroke/TIA? Yes   Does the patient understand the diet ordered at discharge? Yes   What is the patient's perception of their health status since discharge? Improving   Nursing interventions Nurse provided patient education   Is the patient able to teach back FAST for Stroke? Yes   Is the patient/caregiver able to teach back the risk factors for a stroke? High blood pressure-goal below 120/80,  High Cholesterol,  History of TIAs   Is the patient/caregiver able to teach back signs and symptoms related to disease process for when to call PCP? Yes   Is the patient/caregiver able to teach back signs and symptoms related to disease process for when to call 911? Yes   Is the patient/caregiver able to teach back the hierarchy of who to call/visit for symptoms/problems? PCP, Specialist, Home health nurse, Urgent Care, ED, 911 Yes   Week 3 call completed? Yes   Revoked No further contact(revokes)-requires comment   Is the patient interested in additional calls from an ambulatory ?  NOTE:  applies to high risk patients requiring additional follow-up. No   Graduated/Revoked comments Doing well, no further calls needed.          Yolanda Thomas RN

## 2022-02-25 DIAGNOSIS — I10 ESSENTIAL HYPERTENSION: ICD-10-CM

## 2022-02-25 RX ORDER — LISINOPRIL 20 MG/1
TABLET ORAL
Qty: 90 TABLET | Refills: 1 | Status: SHIPPED | OUTPATIENT
Start: 2022-02-25 | End: 2022-06-17 | Stop reason: HOSPADM

## 2022-02-25 RX ORDER — HYDROCHLOROTHIAZIDE 25 MG/1
TABLET ORAL
Qty: 90 TABLET | Refills: 0 | Status: SHIPPED | OUTPATIENT
Start: 2022-02-25 | End: 2022-05-20

## 2022-02-28 ENCOUNTER — OFFICE VISIT (OUTPATIENT)
Dept: FAMILY MEDICINE CLINIC | Facility: CLINIC | Age: 62
End: 2022-02-28

## 2022-02-28 ENCOUNTER — LAB (OUTPATIENT)
Dept: LAB | Facility: OTHER | Age: 62
End: 2022-02-28

## 2022-02-28 VITALS
SYSTOLIC BLOOD PRESSURE: 122 MMHG | DIASTOLIC BLOOD PRESSURE: 70 MMHG | BODY MASS INDEX: 38.65 KG/M2 | HEART RATE: 57 BPM | TEMPERATURE: 98.2 F | HEIGHT: 68 IN | RESPIRATION RATE: 16 BRPM | OXYGEN SATURATION: 98 % | WEIGHT: 255 LBS

## 2022-02-28 DIAGNOSIS — M54.41 CHRONIC RIGHT-SIDED LOW BACK PAIN WITH RIGHT-SIDED SCIATICA: Chronic | ICD-10-CM

## 2022-02-28 DIAGNOSIS — K21.9 GERD WITHOUT ESOPHAGITIS: ICD-10-CM

## 2022-02-28 DIAGNOSIS — G89.29 CHRONIC RIGHT-SIDED LOW BACK PAIN WITH RIGHT-SIDED SCIATICA: Chronic | ICD-10-CM

## 2022-02-28 DIAGNOSIS — E53.8 FOLATE DEFICIENCY: Chronic | ICD-10-CM

## 2022-02-28 DIAGNOSIS — L03.115 CELLULITIS OF RIGHT LOWER EXTREMITY: ICD-10-CM

## 2022-02-28 DIAGNOSIS — I10 HYPERTENSION, UNSPECIFIED TYPE: ICD-10-CM

## 2022-02-28 DIAGNOSIS — I25.118 CORONARY ARTERY DISEASE OF NATIVE ARTERY OF NATIVE HEART WITH STABLE ANGINA PECTORIS: Primary | Chronic | ICD-10-CM

## 2022-02-28 DIAGNOSIS — E78.2 MIXED HYPERLIPIDEMIA: ICD-10-CM

## 2022-02-28 DIAGNOSIS — Z79.4 TYPE 2 DIABETES MELLITUS WITH HYPERGLYCEMIA, WITH LONG-TERM CURRENT USE OF INSULIN: ICD-10-CM

## 2022-02-28 DIAGNOSIS — E53.8 CYANOCOBALAMIN DEFICIENCY: ICD-10-CM

## 2022-02-28 DIAGNOSIS — Z51.81 ENCOUNTER FOR THERAPEUTIC DRUG LEVEL MONITORING: ICD-10-CM

## 2022-02-28 DIAGNOSIS — E11.65 TYPE 2 DIABETES MELLITUS WITH HYPERGLYCEMIA, WITH LONG-TERM CURRENT USE OF INSULIN: ICD-10-CM

## 2022-02-28 DIAGNOSIS — B37.31 VAGINAL YEAST INFECTION: ICD-10-CM

## 2022-02-28 DIAGNOSIS — G54.6 PHANTOM PAIN AFTER AMPUTATION OF LOWER EXTREMITY: Chronic | ICD-10-CM

## 2022-02-28 DIAGNOSIS — E55.9 VITAMIN D DEFICIENCY: ICD-10-CM

## 2022-02-28 DIAGNOSIS — F41.1 GENERALIZED ANXIETY DISORDER: Chronic | ICD-10-CM

## 2022-02-28 DIAGNOSIS — L28.2 PRURITIC RASH: Chronic | ICD-10-CM

## 2022-02-28 PROCEDURE — 82746 ASSAY OF FOLIC ACID SERUM: CPT | Performed by: NURSE PRACTITIONER

## 2022-02-28 PROCEDURE — 99214 OFFICE O/P EST MOD 30 MIN: CPT | Performed by: NURSE PRACTITIONER

## 2022-02-28 PROCEDURE — 36415 COLL VENOUS BLD VENIPUNCTURE: CPT | Performed by: NURSE PRACTITIONER

## 2022-02-28 RX ORDER — CEPHALEXIN 500 MG/1
500 CAPSULE ORAL 3 TIMES DAILY
Qty: 21 CAPSULE | Refills: 0 | Status: ON HOLD | OUTPATIENT
Start: 2022-02-28 | End: 2022-04-04

## 2022-02-28 RX ORDER — GABAPENTIN 400 MG/1
400 CAPSULE ORAL 3 TIMES DAILY
Qty: 90 CAPSULE | Refills: 2 | Status: SHIPPED | OUTPATIENT
Start: 2022-02-28 | End: 2022-05-24 | Stop reason: SDUPTHER

## 2022-02-28 RX ORDER — CLOPIDOGREL BISULFATE 75 MG/1
75 TABLET ORAL DAILY
Qty: 90 TABLET | Refills: 1 | Status: SHIPPED | OUTPATIENT
Start: 2022-02-28 | End: 2022-08-05 | Stop reason: SDUPTHER

## 2022-02-28 RX ORDER — HYDROCODONE BITARTRATE AND ACETAMINOPHEN 7.5; 325 MG/1; MG/1
1 TABLET ORAL EVERY 6 HOURS PRN
Qty: 120 TABLET | Refills: 0 | Status: CANCELLED | OUTPATIENT
Start: 2022-02-28

## 2022-02-28 RX ORDER — PANTOPRAZOLE SODIUM 20 MG/1
20 TABLET, DELAYED RELEASE ORAL DAILY
Qty: 90 TABLET | Refills: 3 | Status: ON HOLD | OUTPATIENT
Start: 2022-02-28 | End: 2022-04-05 | Stop reason: SDUPTHER

## 2022-02-28 RX ORDER — LORAZEPAM 0.5 MG/1
0.5 TABLET ORAL EVERY 8 HOURS
Qty: 90 TABLET | Refills: 0 | Status: SHIPPED | OUTPATIENT
Start: 2022-02-28 | End: 2022-04-18

## 2022-02-28 RX ORDER — BETAMETHASONE DIPROPIONATE 0.5 MG/G
1 CREAM TOPICAL 2 TIMES DAILY
Qty: 45 G | Refills: 1 | Status: ON HOLD | OUTPATIENT
Start: 2022-02-28 | End: 2022-04-04

## 2022-02-28 NOTE — PROGRESS NOTES
Subjective   Ariana Martinez is a 62 y.o. female.     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, with long-term current use of insulin ([facility]); Closed nondisplaced fracture of fifth left metatarsal bone; MAURICIO (generalized anxiety disorder); Depression, major, recurrent, moderate ([facility]); GERD without esophagitis; Long term prescription opiate use; Mixed hyperlipidemia; Vitamin D deficiency; Seasonal allergic rhinitis; Restrictive lung disease secondary to obesity; Adult body mass index 37.0-37.9; Snoring; Class 3 severe obesity due to excess calories without serious comorbidity with body mass index (BMI) of 40.0 to 44.9 in adult ([facility]); (HFpEF) heart failure with preserved ejection fraction ([facility]); Type 2 diabetes mellitus with hyperglycemia, with long-term current use of insulin ([facility]); Cyanocobalamin deficiency; CAD (coronary artery disease); Hypertension; Meniere's disease; Gastroparesis; Otitis media with effusion, left; Pulmonary hypertension ([facility]); Displaced fracture of fifth metatarsal bone, left foot, subsequent encounter for fracture with nonunion; Pes cavus; Primary osteoarthritis involving multiple joints; Generalized anxiety disorder; Chronic right-sided low back pain with right-sided sciatica; Chest pain; Thrombocytosis; Leukocytosis; Iron deficiency; Adverse effect of iron; Hindfoot varus, acquired, left; Chest pain due to myocardial ischemia; Nonrheumatic tricuspid valve regurgitation; Iron deficiency anemia due to chronic blood loss; Malaise and fatigue; Nonunion of subtalar arthrodesis; Ankle arthritis; Cellulitis of left lower extremity; Urinary retention; Acute bacterial endocarditis; Staphylococcus aureus bacteremia; Infection due to Acinetobacter species; Endocarditis; Left foot infection; Uncontrolled hypertension; Occlusion and stenosis of bilateral carotid arteries; S/P BKA (below knee amputation) unilateral, left ([facility]); Phantom pain after amputation of lower extremity  (HCC); S/P CABG (coronary artery bypass graft); Acute colitis; Severe malnutrition (HCC); Sepsis (HCC); and TIA (transient ischemic attack) on their problem list.     Chief Complaint   Patient presents with   • Pain     12 week f/u        History of Present Illness     Patient here for routine follow up of chronic left residual leg phantom pain and diabetic polyneuorpathy managed with hydrocodone and gabapentin. Reports adequate relief of pain with current medications. Left AKA covered with elastic covering. Reports having a new ortho appliance made for it due to a bone close to skin surface that rubs.     Chronic anxiety related to multiple serious medical conditions and challenges. Managed well with mutiple meds including prn lorazepam. Due for refill today. Compliant with use.     Reports at least 1 month ago, developed about  6 inch area right lateral lower leg, nearest knee, with dry and pruritic skin. Sometime after this developed noted multiple shotty knots under the skin in the same area. She has been scratching often, but no visible open areas today. Visible scarring and scratch marks from scratching noted. Mild erythema, dry flaky skin noted. No pain. No swelling.   Normothermic.    Also reports for about a month has been developing painful sores in right nare. She is folate deficient and sometimes lfolate deficiency can cause this. Will get a folate level today, have her bump up folic acid to 2mg daily x 7 days and if deficient on todays labs, when she has been consistently taking folic 1mg daily, will increase prescription. Discussed that addition of Keflex for the cellulitis RLE will also help heal the nose sores.    No other complaint today, other than mention she gets vaginal yeast infections with antibiotic use. Prefers a cream for this, but insurance only covers vaginal suppository antifungal, so this is prescribed and she is OK with that.       Past Surgical History:   Procedure Laterality Date   •  ABDOMINAL SURGERY     • AMPUTATION FOOT     • ANGIOPLASTY      coronary   • ANKLE ARTHROSCOPY Left 2/26/2021    Procedure: Left foot hardware removal, ankle arthroscopy, bone grafting , left foot exostectomy;  Surgeon: Ignacio Lord DPM;  Location: [facility] OR;  Service: Podiatry;  Laterality: Left;   • BREAST BIOPSY Right    • CARDIAC CATHETERIZATION     • CARDIAC CATHETERIZATION N/A 6/20/2017    Procedure: Right Heart Cath;  Surgeon: Can Kwon MD PhD;  Location: [facility] CATH INVASIVE LOCATION;  Service:    • CARDIAC CATHETERIZATION N/A 2/18/2020    Procedure: Left Heart Cath;  Surgeon: Catalina Cooper MD;  Location: [facility] CATH INVASIVE LOCATION;  Service: Cardiology;  Laterality: N/A;   • CARPAL TUNNEL RELEASE     • CHOLECYSTECTOMY     • COLONOSCOPY N/A 6/24/2020    Procedure: COLONOSCOPY;  Surgeon: Julián Maldonado MD;  Location: [facility] ENDOSCOPY;  Service: Gastroenterology;  Laterality: N/A;   • CORONARY ARTERY BYPASS GRAFT  2005   • ENDOSCOPY N/A 10/19/2018    Procedure: ESOPHAGOGASTRODUODENOSCOPY possible dilation;  Surgeon: Julián Maldonado MD;  Location: [facility] ENDOSCOPY;  Service: Gastroenterology   • ENDOSCOPY N/A 6/24/2020    Procedure: ESOPHAGOGASTRODUODENOSCOPY WED appt please;  Surgeon: Julián Maldonado MD;  Location: [facility] ENDOSCOPY;  Service: Gastroenterology;  Laterality: N/A;   • ENDOSCOPY AND COLONOSCOPY     • FOOT SURGERY      Toes   • FOOT SURGERY     • GASTRIC BANDING      Revision, laparoscopic   • HYSTERECTOMY     • INCISION AND DRAINAGE LEG Left 3/12/2021    Procedure: Left ankle arthroscopic irrigation and debridement, screw removal;  Surgeon: Ignacio Lord DPM;  Location: [facility] OR;  Service: Podiatry;  Laterality: Left;   • MOUTH SURGERY     • SALPINGO OOPHORECTOMY     • SHOULDER SURGERY     • SUBTALAR ARTHRODESIS Left 1/16/2019    Procedure: LEFT FOOT HARDWARE REMOVAL, FIFTH METATARSAL , OPEN REDUCTION INTERNAL FIXATION, CALCANEAL OSTEOTOMY;  Surgeon:  Ignacio Lord DPM;  Location: [facility];  Service: Podiatry   • SUBTALAR ARTHRODESIS Left 10/16/2019    Procedure: foot hardware removal, subtalar joint fusion  possible de/reattachment of achilles tendon        (c-arm);  Surgeon: Ignacio Lord DPM;  Location: [facility];  Service: Podiatry   • SUBTALAR ARTHRODESIS Left 9/30/2020    Procedure: subtalar, talonavicular joint arthrodesis.  Removal hardware.          (c-arm);  Surgeon: Ignacio Lord DPM;  Location: [facility];  Service: Podiatry;  Laterality: Left;   • TRANSESOPHAGEAL ECHOCARDIOGRAM (LAMONTE)      With color flow      Social History     Socioeconomic History   • Marital status:    Tobacco Use   • Smoking status: Never Smoker   • Smokeless tobacco: Never Used   Vaping Use   • Vaping Use: Never used   Substance and Sexual Activity   • Alcohol use: No   • Drug use: No   • Sexual activity: Defer      The following portions of the patient's history were reviewed and updated as appropriate: allergies, current medications, past family history, past medical history, past social history, past surgical history and problem list.    Review of Systems   Constitutional: Negative.    HENT: Positive for rhinorrhea. Negative for congestion.    Eyes: Negative.  Negative for blurred vision, double vision, photophobia and visual disturbance.   Respiratory: Negative.  Negative for cough, shortness of breath, wheezing and stridor.    Cardiovascular: Negative.  Negative for chest pain, palpitations and leg swelling.   Gastrointestinal: Negative.  Negative for abdominal distention, abdominal pain, blood in stool, constipation, diarrhea, nausea, vomiting, GERD and indigestion.   Endocrine: Negative.  Negative for cold intolerance and heat intolerance.   Genitourinary: Negative.  Negative for dysuria, flank pain, frequency and urinary incontinence.   Musculoskeletal: Positive for arthralgias. Negative for back pain.        L AKA   Skin: Positive for rash (RLE)  and skin lesions (R nare).   Allergic/Immunologic: Negative.  Negative for immunocompromised state.   Neurological: Negative.    Hematological: Negative.    Psychiatric/Behavioral: Negative for agitation, behavioral problems, decreased concentration, dysphoric mood, hallucinations, self-injury, sleep disturbance, suicidal ideas, negative for hyperactivity, depressed mood and stress. The patient is nervous/anxious.    All other systems reviewed and are negative.    PHQ-9 Depression Screening  Little interest or pleasure in doing things? 0   Feeling down, depressed, or hopeless? 0   Trouble falling or staying asleep, or sleeping too much?     Feeling tired or having little energy?     Poor appetite or overeating?     Feeling bad about yourself - or that you are a failure or have let yourself or your family down?     Trouble concentrating on things, such as reading the newspaper or watching television?     Moving or speaking so slowly that other people could have noticed? Or the opposite - being so fidgety or restless that you have been moving around a lot more than usual?     Thoughts that you would be better off dead, or of hurting yourself in some way?     PHQ-9 Total Score 0   If you checked off any problems, how difficult have these problems made it for you to do your work, take care of things at home, or get along with other people?      Patient understands the risks associated with this controlled medication, including tolerance and addiction.  Patient also agrees to only obtain this medication from me, and not from a another provider, unless that provider is covering for me in my absence.  Patient also agrees to be compliant in dosing, and not self adjust the dose of medication.  A signed controlled substance agreement is on file, and the patient has received a controlled substance education sheet at this a previous visit.  The patient has also signed a consent for treatment with a controlled substance as per  [facility] policy. LESTER was obtained.    Objective   Physical Exam  Vitals and nursing note reviewed.   Constitutional:       General: She is not in acute distress.     Appearance: Normal appearance. She is well-developed. She is obese. She is not ill-appearing or diaphoretic.   HENT:      Head: Normocephalic and atraumatic.   Eyes:      General: No scleral icterus.        Right eye: No discharge.         Left eye: No discharge.      Conjunctiva/sclera: Conjunctivae normal.      Pupils: Pupils are equal, round, and reactive to light.   Neck:      Thyroid: No thyromegaly.      Vascular: No carotid bruit or JVD.      Trachea: No tracheal deviation.   Cardiovascular:      Rate and Rhythm: Normal rate and regular rhythm.      Pulses: Normal pulses.      Heart sounds: Normal heart sounds. No murmur heard.  No friction rub. No gallop.    Pulmonary:      Effort: Pulmonary effort is normal. No respiratory distress.      Breath sounds: Normal breath sounds. No stridor. No wheezing, rhonchi or rales.   Chest:      Chest wall: No tenderness.   Abdominal:      General: Bowel sounds are normal.      Palpations: Abdomen is soft.   Musculoskeletal:         General: No tenderness or deformity. Normal range of motion.      Cervical back: Normal range of motion and neck supple. No rigidity or tenderness.      Right lower leg: No edema.      Left lower leg: No edema.   Lymphadenopathy:      Cervical: No cervical adenopathy.   Skin:     General: Skin is warm and dry.      Capillary Refill: Capillary refill takes 2 to 3 seconds.      Coloration: Skin is not jaundiced or pale.      Findings: Lesion (right nare) and rash (right lower extremity as described) present. No bruising or erythema.   Neurological:      General: No focal deficit present.      Mental Status: She is alert and oriented to person, place, and time. Mental status is at baseline.      Motor: No weakness.      Gait: Gait normal.   Psychiatric:         Mood and  "Affect: Mood normal.         Behavior: Behavior normal.         Thought Content: Thought content normal.         Judgment: Judgment normal.       Vitals:    02/28/22 0947   BP: 122/70   BP Location: Left arm   Patient Position: Sitting   Cuff Size: Adult   Pulse: 57   Resp: 16   Temp: 98.2 °F (36.8 °C)   SpO2: 98%   Weight: 116 kg (255 lb)   Height: 172.7 cm (67.99\")   PainSc:   5   PainLoc: Generalized      Body mass index is 38.78 kg/m².      Assessment/Plan   Diagnoses and all orders for this visit:    1. Coronary artery disease of native artery of native heart with stable angina pectoris (HCC) (Primary)  Comments:  stable, no current angina, continue plavix. refill today  Orders:  -     clopidogrel (PLAVIX) 75 MG tablet; Take 1 tablet by mouth Daily.  Dispense: 90 tablet; Refill: 1    2. Chronic right-sided low back pain with right-sided sciatica  Comments:  stable continue hydrocodone and gabapentin, call when due. refill gabapentin today.   Orders:  -     gabapentin (NEURONTIN) 400 MG capsule; Take 1 capsule by mouth 3 (Three) Times a Day.  Dispense: 90 capsule; Refill: 2    3. Phantom pain after amputation of lower extremity (HCC)  Comments:  continue hydrocodone and fred, refill fred today.  Orders:  -     gabapentin (NEURONTIN) 400 MG capsule; Take 1 capsule by mouth 3 (Three) Times a Day.  Dispense: 90 capsule; Refill: 2    4. Chronic right-sided low back pain with right-sided sciatica  Comments:  controlled with [place], call when due. refill fred today.   Orders:  -     gabapentin (NEURONTIN) 400 MG capsule; Take 1 capsule by mouth 3 (Three) Times a Day.  Dispense: 90 capsule; Refill: 2    5. Generalized anxiety disorder  Comments:  stable, continue clonazepam and other meds.   Orders:  -     LORazepam (ATIVAN) 0.5 MG tablet; Take 1 tablet by mouth Every 8 (Eight) Hours.  Dispense: 90 tablet; Refill: 0    6. GERD without esophagitis  -     pantoprazole (PROTONIX) 20 MG EC tablet; Take 1 tablet by mouth " Daily.  Dispense: 90 tablet; Refill: 3    7. Folate deficiency  Comments:  developed intranasal sores on 1mg daily, increase to 2 mg QD x 7d, folate level today on the 1mg daily. if deficient will up QD dose.   Orders:  -     Folate  -     Vitamin B12 & Folate; Future    8. Cellulitis of right lower extremity  Comments:  appears intiated from scratching eczematous patch. keflex prescribed today.   Orders:  -     cephalexin (Keflex) 500 MG capsule; Take 1 capsule by mouth 3 (Three) Times a Day.  Dispense: 21 capsule; Refill: 0    9. Vaginal yeast infection  Comments:  preventive antifungal vag supp prescribed today due to antibx therapy started  Orders:  -     miconazole (MICOTIN) 200 MG vaginal suppository; Insert 1 suppository into the vagina Every Night.  Dispense: 7 each; Refill: 0    10. Pruritic rash  Comments:  steroid topical cream to apparent eczema patch right LE.  Orders:  -     betamethasone dipropionate 0.05 % cream; Apply 1 application topically to the appropriate area as directed 2 (Two) Times a Day. Until rash resolves  Dispense: 45 g; Refill: 1    11. Mixed hyperlipidemia  -     Lipid Panel; Future    12. Hypertension, unspecified type  -     Comprehensive Metabolic Panel; Future  -     CBC & Differential; Future    13. Vitamin D deficiency  -     Vitamin D 25 Hydroxy; Future    14. Type 2 diabetes mellitus with hyperglycemia, with long-term current use of insulin (HCC)  -     Comprehensive Metabolic Panel; Future  -     Hemoglobin A1c; Future    15. Cyanocobalamin deficiency  -     Vitamin B12 & Folate; Future    16. Encounter for therapeutic drug level monitoring  -     ToxASSURE Select 13 Discrete -; Future  -     Gabapentin, Urine -; Future               Return in about 12 weeks (around 5/23/2022).  There are no Patient Instructions on file for this visit.        This document has been electronically signed by BEBE Palomino on February 28, 2022 11:27 CST

## 2022-03-01 LAB — FOLATE SERPL-MCNC: >20 NG/ML (ref 4.78–24.2)

## 2022-03-03 ENCOUNTER — TELEPHONE (OUTPATIENT)
Dept: FAMILY MEDICINE CLINIC | Facility: CLINIC | Age: 62
End: 2022-03-03

## 2022-03-14 ENCOUNTER — TELEPHONE (OUTPATIENT)
Dept: FAMILY MEDICINE CLINIC | Facility: CLINIC | Age: 62
End: 2022-03-14

## 2022-03-14 DIAGNOSIS — G54.6 PHANTOM PAIN AFTER AMPUTATION OF LOWER EXTREMITY: Chronic | ICD-10-CM

## 2022-03-14 DIAGNOSIS — G89.29 CHRONIC RIGHT-SIDED LOW BACK PAIN WITH RIGHT-SIDED SCIATICA: ICD-10-CM

## 2022-03-14 DIAGNOSIS — M54.41 CHRONIC RIGHT-SIDED LOW BACK PAIN WITH RIGHT-SIDED SCIATICA: ICD-10-CM

## 2022-03-14 RX ORDER — HYDROCODONE BITARTRATE AND ACETAMINOPHEN 7.5; 325 MG/1; MG/1
1 TABLET ORAL EVERY 6 HOURS PRN
Qty: 120 TABLET | Refills: 0 | Status: SHIPPED | OUTPATIENT
Start: 2022-03-14 | End: 2022-04-18 | Stop reason: SDUPTHER

## 2022-03-14 NOTE — TELEPHONE ENCOUNTER
Incoming Refill Request      Medication requested (name and dose):     HYDROcodone-acetaminophen (NORCO) 7.5-325 MG per tablet       Pharmacy where request should be sent:   [facility]    Additional details provided by patient:     Best call back number:     Does the patient have less than a 3 day supply:  [] Yes  [] No    Tiffanie Vaughan  03/14/22, 08:50 CDT

## 2022-03-16 NOTE — PLAN OF CARE
Goal Outcome Evaluation:  Plan of Care Reviewed With: patient  Progress: declining  Outcome Summary: pt resting well, febrile this shift - ice, tylenol, and ibuprofen admin, urinary retention continued - johns placed, non-wt bearing to LLE   abdominal pain

## 2022-03-17 DIAGNOSIS — Z89.512 S/P BKA (BELOW KNEE AMPUTATION) UNILATERAL, LEFT: Primary | ICD-10-CM

## 2022-03-22 ENCOUNTER — APPOINTMENT (OUTPATIENT)
Dept: ONCOLOGY | Facility: HOSPITAL | Age: 62
End: 2022-03-22

## 2022-03-24 RX ORDER — METOCLOPRAMIDE 10 MG/1
10 TABLET ORAL 4 TIMES DAILY PRN
Qty: 120 TABLET | Refills: 1 | Status: SHIPPED | OUTPATIENT
Start: 2022-03-24 | End: 2022-06-20

## 2022-03-24 RX ORDER — FOLIC ACID 1 MG/1
TABLET ORAL
Qty: 90 TABLET | Refills: 1 | Status: SHIPPED | OUTPATIENT
Start: 2022-03-24 | End: 2022-08-22

## 2022-03-24 NOTE — TELEPHONE ENCOUNTER
Rx Refill Note  Requested Prescriptions     Pending Prescriptions Disp Refills   • folic acid (FOLVITE) 1 MG tablet [Pharmacy Med Name: FOLIC ACID 1 MG TABLET] 90 tablet 1     Sig: TAKE 1 TABLET BY MOUTH EVERY DAY      Last office visit with prescribing clinician: 11/30/2021      Next office visit with prescribing clinician: 4/5/2022            Joana Elizalde RN  03/24/22, 08:04 CDT

## 2022-03-28 DIAGNOSIS — E53.8 CYANOCOBALAMIN DEFICIENCY: ICD-10-CM

## 2022-03-31 ENCOUNTER — TELEPHONE (OUTPATIENT)
Dept: FAMILY MEDICINE CLINIC | Facility: CLINIC | Age: 62
End: 2022-03-31

## 2022-03-31 DIAGNOSIS — L28.2 PRURITIC RASH: Chronic | ICD-10-CM

## 2022-03-31 DIAGNOSIS — E53.8 CYANOCOBALAMIN DEFICIENCY: ICD-10-CM

## 2022-03-31 RX ORDER — VENLAFAXINE HYDROCHLORIDE 150 MG/1
150 CAPSULE, EXTENDED RELEASE ORAL 2 TIMES DAILY
Qty: 90 CAPSULE | Refills: 1 | Status: SHIPPED | OUTPATIENT
Start: 2022-03-31 | End: 2022-06-17 | Stop reason: HOSPADM

## 2022-04-04 ENCOUNTER — APPOINTMENT (OUTPATIENT)
Dept: GENERAL RADIOLOGY | Facility: HOSPITAL | Age: 62
End: 2022-04-04

## 2022-04-04 ENCOUNTER — HOSPITAL ENCOUNTER (OUTPATIENT)
Facility: HOSPITAL | Age: 62
Discharge: HOME | End: 2022-04-07
Attending: EMERGENCY MEDICINE | Admitting: INTERNAL MEDICINE

## 2022-04-04 DIAGNOSIS — K21.9 GERD WITHOUT ESOPHAGITIS: ICD-10-CM

## 2022-04-04 DIAGNOSIS — Z74.09 IMPAIRED MOBILITY AND ADLS: ICD-10-CM

## 2022-04-04 DIAGNOSIS — R07.9 CHEST PAIN, UNSPECIFIED TYPE: Primary | ICD-10-CM

## 2022-04-04 DIAGNOSIS — R53.1 WEAKNESS: ICD-10-CM

## 2022-04-04 DIAGNOSIS — Z78.9 IMPAIRED MOBILITY AND ADLS: ICD-10-CM

## 2022-04-04 DIAGNOSIS — Q24.5 CORONARY ARTERY ABNORMALITY: ICD-10-CM

## 2022-04-04 DIAGNOSIS — Z74.09 IMPAIRED FUNCTIONAL MOBILITY, BALANCE, GAIT, AND ENDURANCE: ICD-10-CM

## 2022-04-04 DIAGNOSIS — N17.9 AKI (ACUTE KIDNEY INJURY): ICD-10-CM

## 2022-04-04 DIAGNOSIS — I95.9 HYPOTENSION, UNSPECIFIED HYPOTENSION TYPE: ICD-10-CM

## 2022-04-04 LAB
ALBUMIN SERPL-MCNC: 3.7 G/DL (ref 3.5–5.2)
ALBUMIN/GLOB SERPL: 1.2 G/DL
ALP SERPL-CCNC: 131 U/L (ref 39–117)
ALT SERPL W P-5'-P-CCNC: 15 U/L (ref 1–33)
ANION GAP SERPL CALCULATED.3IONS-SCNC: 11 MMOL/L (ref 5–15)
AST SERPL-CCNC: 12 U/L (ref 1–32)
BASOPHILS # BLD AUTO: 0.07 10*3/MM3 (ref 0–0.2)
BASOPHILS NFR BLD AUTO: 0.6 % (ref 0–1.5)
BILIRUB SERPL-MCNC: 0.4 MG/DL (ref 0–1.2)
BILIRUB UR QL STRIP: NEGATIVE
BUN SERPL-MCNC: 27 MG/DL (ref 8–23)
BUN/CREAT SERPL: 21.3 (ref 7–25)
CALCIUM SPEC-SCNC: 8.9 MG/DL (ref 8.6–10.5)
CHLORIDE SERPL-SCNC: 98 MMOL/L (ref 98–107)
CLARITY UR: CLEAR
CO2 SERPL-SCNC: 23 MMOL/L (ref 22–29)
COLOR UR: YELLOW
CREAT SERPL-MCNC: 1.27 MG/DL (ref 0.57–1)
D-LACTATE SERPL-SCNC: 1.8 MMOL/L (ref 0.5–2)
DEPRECATED RDW RBC AUTO: 41.2 FL (ref 37–54)
EGFRCR SERPLBLD CKD-EPI 2021: 48.5 ML/MIN/1.73
EOSINOPHIL # BLD AUTO: 0.29 10*3/MM3 (ref 0–0.4)
EOSINOPHIL NFR BLD AUTO: 2.6 % (ref 0.3–6.2)
ERYTHROCYTE [DISTWIDTH] IN BLOOD BY AUTOMATED COUNT: 13.2 % (ref 12.3–15.4)
FLUAV RNA RESP QL NAA+PROBE: NOT DETECTED
FLUBV RNA RESP QL NAA+PROBE: NOT DETECTED
GLOBULIN UR ELPH-MCNC: 3.1 GM/DL
GLUCOSE SERPL-MCNC: 167 MG/DL (ref 65–99)
GLUCOSE UR STRIP-MCNC: NEGATIVE MG/DL
HCT VFR BLD AUTO: 32.3 % (ref 34–46.6)
HGB BLD-MCNC: 11.1 G/DL (ref 12–15.9)
HGB UR QL STRIP.AUTO: NEGATIVE
HOLD SPECIMEN: NORMAL
HOLD SPECIMEN: NORMAL
IMM GRANULOCYTES # BLD AUTO: 0.05 10*3/MM3 (ref 0–0.05)
IMM GRANULOCYTES NFR BLD AUTO: 0.4 % (ref 0–0.5)
KETONES UR QL STRIP: NEGATIVE
LEUKOCYTE ESTERASE UR QL STRIP.AUTO: NEGATIVE
LYMPHOCYTES # BLD AUTO: 1.09 10*3/MM3 (ref 0.7–3.1)
LYMPHOCYTES NFR BLD AUTO: 9.6 % (ref 19.6–45.3)
MCH RBC QN AUTO: 30 PG (ref 26.6–33)
MCHC RBC AUTO-ENTMCNC: 34.4 G/DL (ref 31.5–35.7)
MCV RBC AUTO: 87.3 FL (ref 79–97)
MONOCYTES # BLD AUTO: 0.71 10*3/MM3 (ref 0.1–0.9)
MONOCYTES NFR BLD AUTO: 6.3 % (ref 5–12)
NEUTROPHILS NFR BLD AUTO: 80.5 % (ref 42.7–76)
NEUTROPHILS NFR BLD AUTO: 9.15 10*3/MM3 (ref 1.7–7)
NITRITE UR QL STRIP: NEGATIVE
NRBC BLD AUTO-RTO: 0 /100 WBC (ref 0–0.2)
NT-PROBNP SERPL-MCNC: 236.4 PG/ML (ref 0–900)
PH UR STRIP.AUTO: 6 [PH] (ref 5–9)
PLATELET # BLD AUTO: 315 10*3/MM3 (ref 140–450)
PMV BLD AUTO: 9.5 FL (ref 6–12)
POTASSIUM SERPL-SCNC: 4.5 MMOL/L (ref 3.5–5.2)
PROT SERPL-MCNC: 6.8 G/DL (ref 6–8.5)
PROT UR QL STRIP: NEGATIVE
RBC # BLD AUTO: 3.7 10*6/MM3 (ref 3.77–5.28)
SARS-COV-2 RNA RESP QL NAA+PROBE: NOT DETECTED
SODIUM SERPL-SCNC: 132 MMOL/L (ref 136–145)
SP GR UR STRIP: 1.01 (ref 1–1.03)
TROPONIN T SERPL-MCNC: <0.01 NG/ML (ref 0–0.03)
UROBILINOGEN UR QL STRIP: NORMAL
WBC NRBC COR # BLD: 11.36 10*3/MM3 (ref 3.4–10.8)
WHOLE BLOOD HOLD SPECIMEN: NORMAL

## 2022-04-04 PROCEDURE — 81003 URINALYSIS AUTO W/O SCOPE: CPT | Performed by: EMERGENCY MEDICINE

## 2022-04-04 PROCEDURE — 80053 COMPREHEN METABOLIC PANEL: CPT | Performed by: EMERGENCY MEDICINE

## 2022-04-04 PROCEDURE — 96374 THER/PROPH/DIAG INJ IV PUSH: CPT

## 2022-04-04 PROCEDURE — G0378 HOSPITAL OBSERVATION PER HR: HCPCS

## 2022-04-04 PROCEDURE — 63710000001 INSULIN DETEMIR PER 5 UNITS: Performed by: STUDENT IN AN ORGANIZED HEALTH CARE EDUCATION/TRAINING PROGRAM

## 2022-04-04 PROCEDURE — 87636 SARSCOV2 & INF A&B AMP PRB: CPT | Performed by: STUDENT IN AN ORGANIZED HEALTH CARE EDUCATION/TRAINING PROGRAM

## 2022-04-04 PROCEDURE — 71045 X-RAY EXAM CHEST 1 VIEW: CPT

## 2022-04-04 PROCEDURE — 25010000002 ENOXAPARIN PER 10 MG: Performed by: STUDENT IN AN ORGANIZED HEALTH CARE EDUCATION/TRAINING PROGRAM

## 2022-04-04 PROCEDURE — 25010000002 ONDANSETRON PER 1 MG: Performed by: STUDENT IN AN ORGANIZED HEALTH CARE EDUCATION/TRAINING PROGRAM

## 2022-04-04 PROCEDURE — 96372 THER/PROPH/DIAG INJ SC/IM: CPT

## 2022-04-04 PROCEDURE — 93010 ELECTROCARDIOGRAM REPORT: CPT | Performed by: INTERNAL MEDICINE

## 2022-04-04 PROCEDURE — 99284 EMERGENCY DEPT VISIT MOD MDM: CPT

## 2022-04-04 PROCEDURE — 85025 COMPLETE CBC W/AUTO DIFF WBC: CPT | Performed by: EMERGENCY MEDICINE

## 2022-04-04 PROCEDURE — 93005 ELECTROCARDIOGRAM TRACING: CPT | Performed by: EMERGENCY MEDICINE

## 2022-04-04 PROCEDURE — 83880 ASSAY OF NATRIURETIC PEPTIDE: CPT | Performed by: EMERGENCY MEDICINE

## 2022-04-04 PROCEDURE — 83605 ASSAY OF LACTIC ACID: CPT | Performed by: EMERGENCY MEDICINE

## 2022-04-04 PROCEDURE — 36415 COLL VENOUS BLD VENIPUNCTURE: CPT | Performed by: STUDENT IN AN ORGANIZED HEALTH CARE EDUCATION/TRAINING PROGRAM

## 2022-04-04 PROCEDURE — C9803 HOPD COVID-19 SPEC COLLECT: HCPCS

## 2022-04-04 PROCEDURE — 82962 GLUCOSE BLOOD TEST: CPT

## 2022-04-04 PROCEDURE — 84484 ASSAY OF TROPONIN QUANT: CPT | Performed by: STUDENT IN AN ORGANIZED HEALTH CARE EDUCATION/TRAINING PROGRAM

## 2022-04-04 PROCEDURE — 84484 ASSAY OF TROPONIN QUANT: CPT | Performed by: EMERGENCY MEDICINE

## 2022-04-04 RX ORDER — CLOPIDOGREL BISULFATE 75 MG/1
75 TABLET ORAL DAILY
Status: DISCONTINUED | OUTPATIENT
Start: 2022-04-05 | End: 2022-04-07 | Stop reason: HOSPADM

## 2022-04-04 RX ORDER — LANOLIN ALCOHOL/MO/W.PET/CERES
5 CREAM (GRAM) TOPICAL NIGHTLY PRN
Status: DISCONTINUED | OUTPATIENT
Start: 2022-04-04 | End: 2022-04-07 | Stop reason: HOSPADM

## 2022-04-04 RX ORDER — METOPROLOL SUCCINATE 50 MG/1
50 TABLET, EXTENDED RELEASE ORAL DAILY
Status: DISCONTINUED | OUTPATIENT
Start: 2022-04-05 | End: 2022-04-07 | Stop reason: HOSPADM

## 2022-04-04 RX ORDER — FUROSEMIDE 40 MG/1
40 TABLET ORAL DAILY
Status: DISCONTINUED | OUTPATIENT
Start: 2022-04-05 | End: 2022-04-06

## 2022-04-04 RX ORDER — LIDOCAINE HYDROCHLORIDE 20 MG/ML
15 SOLUTION OROPHARYNGEAL ONCE
Status: COMPLETED | OUTPATIENT
Start: 2022-04-04 | End: 2022-04-04

## 2022-04-04 RX ORDER — HYDROCODONE BITARTRATE AND ACETAMINOPHEN 10; 325 MG/1; MG/1
1 TABLET ORAL EVERY 4 HOURS PRN
Status: DISCONTINUED | OUTPATIENT
Start: 2022-04-04 | End: 2022-04-07 | Stop reason: HOSPADM

## 2022-04-04 RX ORDER — BISACODYL 10 MG
10 SUPPOSITORY, RECTAL RECTAL DAILY PRN
Status: DISCONTINUED | OUTPATIENT
Start: 2022-04-04 | End: 2022-04-07 | Stop reason: HOSPADM

## 2022-04-04 RX ORDER — SODIUM CHLORIDE 0.9 % (FLUSH) 0.9 %
10 SYRINGE (ML) INJECTION EVERY 12 HOURS SCHEDULED
Status: DISCONTINUED | OUTPATIENT
Start: 2022-04-04 | End: 2022-04-07 | Stop reason: HOSPADM

## 2022-04-04 RX ORDER — MECLIZINE HYDROCHLORIDE 25 MG/1
25 TABLET ORAL 3 TIMES DAILY PRN
Status: DISCONTINUED | OUTPATIENT
Start: 2022-04-04 | End: 2022-04-07 | Stop reason: HOSPADM

## 2022-04-04 RX ORDER — TAMSULOSIN HYDROCHLORIDE 0.4 MG/1
0.4 CAPSULE ORAL DAILY
Status: DISCONTINUED | OUTPATIENT
Start: 2022-04-05 | End: 2022-04-07 | Stop reason: HOSPADM

## 2022-04-04 RX ORDER — HYDROCODONE BITARTRATE AND ACETAMINOPHEN 7.5; 325 MG/1; MG/1
1 TABLET ORAL EVERY 6 HOURS PRN
Status: DISCONTINUED | OUTPATIENT
Start: 2022-04-04 | End: 2022-04-07 | Stop reason: HOSPADM

## 2022-04-04 RX ORDER — LISINOPRIL 20 MG/1
20 TABLET ORAL DAILY
Status: DISCONTINUED | OUTPATIENT
Start: 2022-04-05 | End: 2022-04-07 | Stop reason: HOSPADM

## 2022-04-04 RX ORDER — ONDANSETRON 4 MG/1
8 TABLET, FILM COATED ORAL EVERY 8 HOURS PRN
Status: DISCONTINUED | OUTPATIENT
Start: 2022-04-04 | End: 2022-04-07 | Stop reason: HOSPADM

## 2022-04-04 RX ORDER — BISACODYL 5 MG/1
5 TABLET, DELAYED RELEASE ORAL DAILY PRN
Status: DISCONTINUED | OUTPATIENT
Start: 2022-04-04 | End: 2022-04-07 | Stop reason: HOSPADM

## 2022-04-04 RX ORDER — METOCLOPRAMIDE 10 MG/1
10 TABLET ORAL 4 TIMES DAILY PRN
Status: DISCONTINUED | OUTPATIENT
Start: 2022-04-04 | End: 2022-04-07 | Stop reason: HOSPADM

## 2022-04-04 RX ORDER — FOLIC ACID 1 MG/1
1000 TABLET ORAL DAILY
Status: DISCONTINUED | OUTPATIENT
Start: 2022-04-05 | End: 2022-04-07 | Stop reason: HOSPADM

## 2022-04-04 RX ORDER — ATORVASTATIN CALCIUM 40 MG/1
80 TABLET, FILM COATED ORAL DAILY
Status: DISCONTINUED | OUTPATIENT
Start: 2022-04-05 | End: 2022-04-07 | Stop reason: HOSPADM

## 2022-04-04 RX ORDER — NITROGLYCERIN 0.4 MG/1
0.4 TABLET SUBLINGUAL
Status: DISCONTINUED | OUTPATIENT
Start: 2022-04-04 | End: 2022-04-07 | Stop reason: HOSPADM

## 2022-04-04 RX ORDER — METHOCARBAMOL 500 MG/1
500 TABLET, FILM COATED ORAL 2 TIMES DAILY PRN
Status: DISCONTINUED | OUTPATIENT
Start: 2022-04-04 | End: 2022-04-07 | Stop reason: HOSPADM

## 2022-04-04 RX ORDER — SODIUM CHLORIDE 0.9 % (FLUSH) 0.9 %
10 SYRINGE (ML) INJECTION AS NEEDED
Status: DISCONTINUED | OUTPATIENT
Start: 2022-04-04 | End: 2022-04-07 | Stop reason: HOSPADM

## 2022-04-04 RX ORDER — AMOXICILLIN 250 MG
2 CAPSULE ORAL 2 TIMES DAILY
Status: DISCONTINUED | OUTPATIENT
Start: 2022-04-04 | End: 2022-04-07 | Stop reason: HOSPADM

## 2022-04-04 RX ORDER — ONDANSETRON 2 MG/ML
4 INJECTION INTRAMUSCULAR; INTRAVENOUS ONCE
Status: COMPLETED | OUTPATIENT
Start: 2022-04-04 | End: 2022-04-04

## 2022-04-04 RX ORDER — POLYETHYLENE GLYCOL 3350 17 G/17G
17 POWDER, FOR SOLUTION ORAL DAILY PRN
Status: DISCONTINUED | OUTPATIENT
Start: 2022-04-04 | End: 2022-04-07 | Stop reason: HOSPADM

## 2022-04-04 RX ORDER — HYDROCHLOROTHIAZIDE 25 MG/1
25 TABLET ORAL DAILY
Status: DISCONTINUED | OUTPATIENT
Start: 2022-04-05 | End: 2022-04-05

## 2022-04-04 RX ORDER — LORAZEPAM 0.5 MG/1
0.5 TABLET ORAL EVERY 8 HOURS
Status: DISCONTINUED | OUTPATIENT
Start: 2022-04-04 | End: 2022-04-07 | Stop reason: HOSPADM

## 2022-04-04 RX ORDER — PANTOPRAZOLE SODIUM 20 MG/1
20 TABLET, DELAYED RELEASE ORAL DAILY
Status: DISCONTINUED | OUTPATIENT
Start: 2022-04-05 | End: 2022-04-07 | Stop reason: HOSPADM

## 2022-04-04 RX ORDER — GABAPENTIN 400 MG/1
400 CAPSULE ORAL 3 TIMES DAILY
Status: DISCONTINUED | OUTPATIENT
Start: 2022-04-04 | End: 2022-04-07 | Stop reason: HOSPADM

## 2022-04-04 RX ORDER — VENLAFAXINE HYDROCHLORIDE 75 MG/1
150 CAPSULE, EXTENDED RELEASE ORAL 2 TIMES DAILY
Status: DISCONTINUED | OUTPATIENT
Start: 2022-04-04 | End: 2022-04-07 | Stop reason: HOSPADM

## 2022-04-04 RX ORDER — ARIPIPRAZOLE 15 MG/1
15 TABLET ORAL DAILY
Status: DISCONTINUED | OUTPATIENT
Start: 2022-04-05 | End: 2022-04-07 | Stop reason: HOSPADM

## 2022-04-04 RX ORDER — MIRTAZAPINE 15 MG/1
30 TABLET, FILM COATED ORAL NIGHTLY
Status: DISCONTINUED | OUTPATIENT
Start: 2022-04-04 | End: 2022-04-07 | Stop reason: HOSPADM

## 2022-04-04 RX ORDER — ASPIRIN 325 MG
325 TABLET, DELAYED RELEASE (ENTERIC COATED) ORAL DAILY
Status: DISCONTINUED | OUTPATIENT
Start: 2022-04-05 | End: 2022-04-07 | Stop reason: HOSPADM

## 2022-04-04 RX ORDER — ALUMINA, MAGNESIA, AND SIMETHICONE 2400; 2400; 240 MG/30ML; MG/30ML; MG/30ML
15 SUSPENSION ORAL ONCE
Status: COMPLETED | OUTPATIENT
Start: 2022-04-04 | End: 2022-04-04

## 2022-04-04 RX ADMIN — HYDROCODONE BITARTRATE AND ACETAMINOPHEN 1 TABLET: 10; 325 TABLET ORAL at 20:33

## 2022-04-04 RX ADMIN — GABAPENTIN 400 MG: 400 CAPSULE ORAL at 20:34

## 2022-04-04 RX ADMIN — ONDANSETRON 4 MG: 2 INJECTION INTRAMUSCULAR; INTRAVENOUS at 15:09

## 2022-04-04 RX ADMIN — LIDOCAINE HYDROCHLORIDE 15 ML: 20 SOLUTION ORAL; TOPICAL at 15:06

## 2022-04-04 RX ADMIN — METOPROLOL TARTRATE 5 MG: 1 INJECTION, SOLUTION INTRAVENOUS at 20:34

## 2022-04-04 RX ADMIN — ALUMINUM HYDROXIDE, MAGNESIUM HYDROXIDE, AND DIMETHICONE 15 ML: 400; 400; 40 SUSPENSION ORAL at 15:05

## 2022-04-04 RX ADMIN — LORAZEPAM 0.5 MG: 0.5 TABLET ORAL at 20:34

## 2022-04-04 RX ADMIN — VENLAFAXINE HYDROCHLORIDE 150 MG: 75 CAPSULE, EXTENDED RELEASE ORAL at 20:34

## 2022-04-04 RX ADMIN — ENOXAPARIN SODIUM 40 MG: 40 INJECTION SUBCUTANEOUS at 20:34

## 2022-04-04 RX ADMIN — MIRTAZAPINE 30 MG: 15 TABLET, FILM COATED ORAL at 20:34

## 2022-04-04 RX ADMIN — INSULIN DETEMIR 15 UNITS: 100 INJECTION, SOLUTION SUBCUTANEOUS at 20:30

## 2022-04-04 RX ADMIN — Medication 10 ML: at 21:48

## 2022-04-04 NOTE — ED NOTES
Pt states hx of stints. C/O CP that started over night which comes and goes.Pt was not able to sleep very well.

## 2022-04-04 NOTE — H&P
[facility] Medicine  HISTORY AND PHYSICAL      Date of Admission: 4/4/2022  Primary Care Physician: Kimberly Ferguson APRN    Subjective     Chief Complaint: Chest pain    History of Present Illness  62-year-old female with morbid obesity, CAD with history of CABG in 2005, PAD, left BKA, chronic diastolic heart failure, chronic back pain, anxiety/depression, GERD, diabetes with neuropathy, presented to the ED with chest pain described as intermittent pressure that started yesterday evening.  Describes some radiation to the back, with some associated nausea and epigastric discomfort.  No diaphoresis and no associated shortness of breath.  She tells me she had an echo fairly recently with her cardiologist outpatient, however she has not had any recent ischemic evaluation.  Initial work-up in the ED included EKG without overt ischemic changes and troponin negative.  No evidence of volume retention or volume overload with normal proBNP and no acute findings on chest x-ray.      Review of Systems   14 point review of systems completed and is negative except as mentioned in the HPI.    Past Medical History:   Past Medical History:   Diagnosis Date   • Angina, class IV ([facility])    • Anxiety    • Anxiety and depression    • Arthritis    • Benign paroxysmal positional vertigo    • Bladder disorder     has bladder stimulator   • Carpal tunnel syndrome    • CHF (congestive heart failure) ([facility])    • Chronic pain    • Coronary atherosclerosis     hx CABG 2005.  is followed by Dr Kwon   • Depression    • Diabetes mellitus (HCC)     Type 2, controlled   • Diabetic polyneuropathy ([facility])    • Disease of thyroid gland    • Elevated cholesterol    • Female stress incontinence    • Foot pain, left    • Full dentures    • Gastroparesis    • GERD (gastroesophageal reflux disease)    • Hyperlipidemia    • Hypertension    • Low back pain    • Malaise and fatigue    • Multiple joint  pain    • Obesity     Refuses to be weighed   • Occlusion and stenosis of bilateral carotid arteries 6/18/2021   • Otalgia     Both   • Perforation of tympanic membrane     Left   • Postoperative wound infection    • Vitamin D deficiency    • Wears glasses     reading     Past Surgical History:  Past Surgical History:   Procedure Laterality Date   • ABDOMINAL SURGERY     • AMPUTATION FOOT     • ANGIOPLASTY      coronary   • ANKLE ARTHROSCOPY Left 2/26/2021    Procedure: Left foot hardware removal, ankle arthroscopy, bone grafting , left foot exostectomy;  Surgeon: Ignacio Lord DPM;  Location: [facility] OR;  Service: Podiatry;  Laterality: Left;   • BREAST BIOPSY Right    • CARDIAC CATHETERIZATION     • CARDIAC CATHETERIZATION N/A 6/20/2017    Procedure: Right Heart Cath;  Surgeon: Can Kwon MD PhD;  Location: [facility] CATH INVASIVE LOCATION;  Service:    • CARDIAC CATHETERIZATION N/A 2/18/2020    Procedure: Left Heart Cath;  Surgeon: Catalina Cooper MD;  Location: [facility] CATH INVASIVE LOCATION;  Service: Cardiology;  Laterality: N/A;   • CARPAL TUNNEL RELEASE     • CHOLECYSTECTOMY     • COLONOSCOPY N/A 6/24/2020    Procedure: COLONOSCOPY;  Surgeon: Julián Maldonado MD;  Location: [facility] ENDOSCOPY;  Service: Gastroenterology;  Laterality: N/A;   • CORONARY ARTERY BYPASS GRAFT  2005   • ENDOSCOPY N/A 10/19/2018    Procedure: ESOPHAGOGASTRODUODENOSCOPY possible dilation;  Surgeon: Julián Maldonado MD;  Location: [facility] ENDOSCOPY;  Service: Gastroenterology   • ENDOSCOPY N/A 6/24/2020    Procedure: ESOPHAGOGASTRODUODENOSCOPY WED appt please;  Surgeon: Julián Maldonado MD;  Location: [facility] ENDOSCOPY;  Service: Gastroenterology;  Laterality: N/A;   • ENDOSCOPY AND COLONOSCOPY     • FOOT SURGERY      Toes   • FOOT SURGERY     • GASTRIC BANDING      Revision, laparoscopic   • HYSTERECTOMY     • INCISION AND DRAINAGE LEG Left 3/12/2021    Procedure: Left ankle arthroscopic irrigation and debridement,  screw removal;  Surgeon: Ignacio Lord DPM;  Location: [facility];  Service: Podiatry;  Laterality: Left;   • MOUTH SURGERY     • SALPINGO OOPHORECTOMY     • SHOULDER SURGERY     • SUBTALAR ARTHRODESIS Left 1/16/2019    Procedure: LEFT FOOT HARDWARE REMOVAL, FIFTH METATARSAL , OPEN REDUCTION INTERNAL FIXATION, CALCANEAL OSTEOTOMY;  Surgeon: Ignacio Lord DPM;  Location: [facility] OR;  Service: Podiatry   • SUBTALAR ARTHRODESIS Left 10/16/2019    Procedure: foot hardware removal, subtalar joint fusion  possible de/reattachment of achilles tendon        (c-arm);  Surgeon: Ignacio Lord DPM;  Location: [facility] OR;  Service: Podiatry   • SUBTALAR ARTHRODESIS Left 9/30/2020    Procedure: subtalar, talonavicular joint arthrodesis.  Removal hardware.          (c-arm);  Surgeon: Ignacio Lord DPM;  Location: [facility];  Service: Podiatry;  Laterality: Left;   • TRANSESOPHAGEAL ECHOCARDIOGRAM (LAMONTE)      With color flow     Social History:  reports that she has never smoked. She has never used smokeless tobacco. She reports that she does not drink alcohol and does not use drugs.    Family History: family history includes Diabetes in her sister, sister, sister, sister, sister, sister and another family member; Heart disease in her mother, sister, sister, and another family member; Hypertension in her mother, sister, sister, and another family member; Stroke in her mother.       Allergies:  Allergies   Allergen Reactions   • Seroquel [Quetiapine] Anaphylaxis   • Avandia [Rosiglitazone] Swelling   • Morphine And Related Hallucinations   • Oxycodone Hallucinations       Medications:  Prior to Admission medications    Medication Sig Start Date End Date Taking? Authorizing Provider   Accu-Chek Guide test strip USE AS INSTRUCTED 1/24/22   Kimberly Ferguson APRN   ARIPiprazole (ABILIFY) 15 MG tablet TAKE 1 TABLET BY MOUTH EVERY DAY 1/24/22   Kimberly Ferguson APRN   aspirin  MG tablet Take 1 tablet by  mouth Daily. 2/8/22   Mahin Esteves MD   atorvastatin (LIPITOR) 80 MG tablet TAKE 1 TABLET BY MOUTH EVERY DAY 12/6/21   Marty, BEBE uLu   BD SHARPS CONTAINER HOME misc 1 each Take As Directed. 9/7/18   Francisca Fong MD   betamethasone dipropionate 0.05 % cream Apply 1 application topically to the appropriate area as directed 2 (Two) Times a Day. Until rash resolves 2/28/22   Marty, BEBE Luu   Blood Glucose Monitoring Suppl (ONE TOUCH ULTRA MINI) w/Device kit Patient will need the Accu-Check Avitio meter 10/18/21   Marty, BEBE Luu   Calcium Citrate-Vitamin D (CITRACAL/VITAMIN D) 250-200 MG-UNIT tablet Take 2 tablets by mouth 2 (two) times a day.    Provider, MD Esther   cephalexin (Keflex) 500 MG capsule Take 1 capsule by mouth 3 (Three) Times a Day. 2/28/22   Kimberly Ferguson APRN   clopidogrel (PLAVIX) 75 MG tablet Take 1 tablet by mouth Daily. 2/28/22   Kimberly Ferguson APRN   cyanocobalamin 1000 MCG/ML injection INJECT 1 ML INTO THE APPROPRIATE MUSCLE AS DIRECTED BY PRESCRIBER EVERY 28 (TWENTY-EIGHT) DAYS. 10/5/21   Kimberly Ferguson APRN   folic acid (FOLVITE) 1 MG tablet TAKE 1 TABLET BY MOUTH EVERY DAY 3/24/22   Teressa Hammond APRN   furosemide (LASIX) 40 MG tablet TAKE 1 TABLET BY MOUTH EVERY DAY 1/13/22   Kimberly Ferguson APRN   gabapentin (NEURONTIN) 400 MG capsule Take 1 capsule by mouth 3 (Three) Times a Day. 2/28/22   Kimberly Ferguson APRN   glucose monitor monitoring kit 1 each Daily. accucheck eve meter, E11.9 6/11/20   Elvis Gamboa APRN   hydroCHLOROthiazide (HYDRODIURIL) 25 MG tablet TAKE 1 TABLET BY MOUTH EVERY DAY 2/25/22   Kimberly Ferguson APRN   HYDROcodone-acetaminophen (NORCO) 7.5-325 MG per tablet Take 1 tablet by mouth Every 6 (Six) Hours As Needed for Moderate Pain . 3/14/22   Kimberly Ferguson APRN   insulin aspart (NovoLOG FlexPen) 100 UNIT/ML solution pen-injector sc pen Inject 0 to 14 units under the skin into  "the appropriate area 3 (three) times daily before meals according to sliding scale on attached sheet. 12/29/21   Kimberly Ferguson APRN   Insulin Glargine (BASAGLAR KWIKPEN) 100 UNIT/ML injection pen Inject 60 Units under the skin into the appropriate area as directed 2 (Two) Times a Day. 9/27/21   Elvis Gamboa APRN   Insulin Pen Needle (B-D ULTRAFINE III SHORT PEN) 31G X 8 MM misc USE TO INJECT 5 TIMES A DAY 8/26/21   Elvis Gamboa APRN   Insulin Pen Needle 31G X 8 MM misc Use up to 4 (four) times daily with insulin as directed. 7/12/21   Brandon Martel MD   Lancets (accu-chek soft touch) lancets As directed 10/18/21   Kimberly Ferguson APRN   lisinopril (PRINIVIL,ZESTRIL) 20 MG tablet TAKE 1 TABLET BY MOUTH EVERY DAY 2/25/22   Kimberly Ferguson APRN   LORazepam (ATIVAN) 0.5 MG tablet Take 1 tablet by mouth Every 8 (Eight) Hours. 2/28/22   Kimberly Ferguson APRN   meclizine (ANTIVERT) 25 MG tablet Take 1 tablet by mouth 3 (Three) Times a Day As Needed for dizziness. 12/14/17   Evon Henrandez APRN   methocarbamol (ROBAXIN) 500 MG tablet TAKE 1 TABLET BY MOUTH 2 (TWO) TIMES A DAY AS NEEDED FOR MUSCLE SPASMS. 12/16/21   Kimberly Ferguson APRN   metoclopramide (REGLAN) 10 MG tablet TAKE 1 TABLET BY MOUTH 4 (FOUR) TIMES A DAY AS NEEDED (NAUSEA). 3/24/22   Marcela Lawson APRN   metoprolol succinate XL (TOPROL-XL) 50 MG 24 hr tablet TAKE 1 TABLET BY MOUTH DAILY. DOSE INCREASED 5/24/21 12/16/21   Kimberly Ferguson APRN   miconazole (MICOTIN) 200 MG vaginal suppository Insert 1 suppository into the vagina Every Night. 2/28/22   Kimberly Ferguson APRN   mirtazapine (REMERON) 30 MG tablet TAKE 1 TABLET BY MOUTH EVERY DAY AT NIGHT 12/16/21   Kimberly Ferguson APRN   naloxone (NARCAN) 0.4 MG/ML injection Infuse 0.5 mL into a venous catheter Every 5 (Five) Minutes As Needed for Opioid Reversal. 3/13/21   Nick Faye MD   Needle, Disp, (Hypodermic Needle) 20G X 1\" misc 1 each " "Every 28 (Twenty-Eight) Days. For drawing up B12 for injection monthly, change back to smaller gauge needle prior to injection. Dx Vitamin B12  Deficiency. 3/31/22   Kimberly Ferguson APRN   nitroglycerin (NITROSTAT) 0.4 MG SL tablet Place 1 tablet under the tongue Every 5 (Five) Minutes As Needed for Chest Pain. Take no more than 3 doses in 15 minutes. 4/29/21   Kimberly Ferguson APRN   NovoLOG 100 UNIT/ML injection INJECT 8 UNITS SUBCUTANEOUSLY 3 TIMES DAILY WITH MEALS. 3/25/22   Elvis Gamboa APRN   ondansetron (ZOFRAN) 8 MG tablet TAKE 1 TABLET BY MOUTH EVERY 8 (EIGHT) HOURS AS NEEDED FOR NAUSEA OR VOMITING. 12/17/21   Kimberly Ferguson APRN   pantoprazole (PROTONIX) 20 MG EC tablet Take 1 tablet by mouth Daily. 2/28/22   Kimberly Ferguson APRN   Syringe/Needle, Disp, (Luer Lock Safety Syringes) 25G X 5/8\" 3 ML misc 1 each Every 28 (Twenty-Eight) Days. 1 Q28 DAYS for injection B12/ cyanocobalomin. Dx vitamin D deficiency. 3/31/22   Kimberly Ferguson APRN   tamsulosin (FLOMAX) 0.4 MG capsule 24 hr capsule Take 1 capsule by mouth Daily. 7/12/21   Brandon Martel MD   venlafaxine XR (EFFEXOR-XR) 150 MG 24 hr capsule Take 1 capsule by mouth 2 (Two) Times a Day. 3/31/22   Kimberly Ferguson APRN   vitamin D (ERGOCALCIFEROL) 1.25 MG (30494 UT) capsule capsule TAKE ONE CAPSULE BY MOUTH EVERY SUNDAY. 12/16/21   Kimberly Ferguson APRN     I have utilized all available immediate resources to obtain, update, and review the patient's current medications.    Objective     Vital Signs: /55   Pulse 90   Temp 98 °F (36.7 °C) (Infrared)   Resp 18   Ht 172.7 cm (68\")   Wt 109 kg (240 lb)   SpO2 97%   BMI 36.49 kg/m²   Physical Exam   General: Resting comfortably in no acute distress  Psych: Calm and cooperative, normal mood  HEENT: NC/AT, no scleral icterus  Cardiovascular: Normal rate, regular rhythm, pulses 2+  Respiratory: Diminished breath sounds secondary to body habitus, no wheezes or " rales  Abdomen: Soft, minimal epigastric tenderness without guarding, bowel sounds present  Neurologic: Alert, follows commands, normal speech  Musculoskeletal: Left BKA  Extremities: No clubbing cyanosis or edema  Skin: Warm and dry, well perfused      Results Reviewed:  Lab Results (last 24 hours)     Procedure Component Value Units Date/Time    [place] Draw [[id number]] Collected: 04/04/22 1306    Specimen: Blood Updated: 04/04/22 1418    Narrative:      The following orders were created for panel order [place] Draw.  Procedure                               Abnormality         Status                     ---------                               -----------         ------                     Green Top (Gel)[[id number]]                                  Final result               Lavender Top[[id number]]                                     Final result               Gold Top - SST[[id number]]                                   Final result               Light Blue Top[[id number]]                                   Final result                 Please view results for these tests on the individual orders.    Green Top (Gel) [[id number]] Collected: 04/04/22 1306    Specimen: Blood Updated: 04/04/22 1418     Extra Tube Hold for add-ons.     Comment: Auto resulted.       Gold Top - SST [[id number]] Collected: 04/04/22 1306    Specimen: Blood Updated: 04/04/22 1418     Extra Tube Hold for add-ons.     Comment: Auto resulted.       Light Blue Top [[id number]] Collected: 04/04/22 1306    Specimen: Blood Updated: 04/04/22 1418     Extra Tube hold for add-on     Comment: Auto resulted       Lavender Top [[id number]] Collected: 04/04/22 1306    Specimen: Blood Updated: 04/04/22 1418     Extra Tube hold for add-on     Comment: Auto resulted       Comprehensive Metabolic Panel [[id number]]  (Abnormal) Collected: 04/04/22 1306    Specimen: Blood Updated: 04/04/22 1326     Glucose 167 mg/dL      BUN 27 mg/dL      Creatinine 1.27 mg/dL       Sodium 132 mmol/L      Potassium 4.5 mmol/L      Chloride 98 mmol/L      CO2 23.0 mmol/L      Calcium 8.9 mg/dL      Total Protein 6.8 g/dL      Albumin 3.70 g/dL      ALT (SGPT) 15 U/L      AST (SGOT) 12 U/L      Alkaline Phosphatase 131 U/L      Total Bilirubin 0.4 mg/dL      Globulin 3.1 gm/dL      A/G Ratio 1.2 g/dL      BUN/Creatinine Ratio 21.3     Anion Gap 11.0 mmol/L      eGFR 48.5 mL/min/1.73      Comment: National Kidney Foundation and American Society of Nephrology (ASN) Task Force recommended calculation based on the Chronic Kidney Disease Epidemiology Collaboration (CKD-EPI) equation refit without adjustment for race.       Narrative:      GFR Normal >60  Chronic Kidney Disease <60  Kidney Failure <15      Troponin [[id number]]  (Normal) Collected: 04/04/22 1306    Specimen: Blood Updated: 04/04/22 1326     Troponin T <0.010 ng/mL     Narrative:      Troponin T Reference Range:  <= 0.03 ng/mL-   Negative for AMI  >0.03 ng/mL-     Abnormal for myocardial necrosis.  Clinicians would have to utilize clinical acumen, EKG, Troponin and serial changes to determine if it is an Acute Myocardial Infarction or myocardial injury due to an underlying chronic condition.       Results may be falsely decreased if patient taking Biotin.      BNP [[id number]]  (Normal) Collected: 04/04/22 1306    Specimen: Blood Updated: 04/04/22 1324     proBNP 236.4 pg/mL     Narrative:      Among patients with dyspnea, NT-proBNP is highly sensitive for the detection of acute congestive heart failure. In addition NT-proBNP of <300 pg/ml effectively rules out acute congestive heart failure with 99% negative predictive value.    Results may be falsely decreased if patient taking Biotin.      Lactic Acid, Plasma [[id number]]  (Normal) Collected: 04/04/22 1306    Specimen: Blood Updated: 04/04/22 1324     Lactate 1.8 mmol/L     CBC & Differential [[id number]]  (Abnormal) Collected: 04/04/22 1306    Specimen: Blood Updated: 04/04/22 1309     Narrative:      The following orders were created for panel order CBC & Differential.  Procedure                               Abnormality         Status                     ---------                               -----------         ------                     CBC Auto Differential[[id number]]        Abnormal            Final result                 Please view results for these tests on the individual orders.    CBC Auto Differential [[id number]]  (Abnormal) Collected: 04/04/22 1306    Specimen: Blood Updated: 04/04/22 1309     WBC 11.36 10*3/mm3      RBC 3.70 10*6/mm3      Hemoglobin 11.1 g/dL      Hematocrit 32.3 %      MCV 87.3 fL      MCH 30.0 pg      MCHC 34.4 g/dL      RDW 13.2 %      RDW-SD 41.2 fl      MPV 9.5 fL      Platelets 315 10*3/mm3      Neutrophil % 80.5 %      Lymphocyte % 9.6 %      Monocyte % 6.3 %      Eosinophil % 2.6 %      Basophil % 0.6 %      Immature Grans % 0.4 %      Neutrophils, Absolute 9.15 10*3/mm3      Lymphocytes, Absolute 1.09 10*3/mm3      Monocytes, Absolute 0.71 10*3/mm3      Eosinophils, Absolute 0.29 10*3/mm3      Basophils, Absolute 0.07 10*3/mm3      Immature Grans, Absolute 0.05 10*3/mm3      nRBC 0.0 /100 WBC         Imaging Results (Last 24 Hours)     Procedure Component Value Units Date/Time    XR Chest 1 View [[id number]] Collected: 04/04/22 1215     Updated: 04/04/22 1254    Narrative:      EXAM DESCRIPTION:     XR CHEST 1 VW    CLINICAL HISTORY:     62 years  Female  CHF/COPD Protocol    COMPARISON:     February 5, 2022    TECHNIQUE:     One view-AP portable radiograph the chest    FINDINGS:     There is chronic elevation the right hemidiaphragm. The lungs are  clear. The cardiac silhouette and pulmonary vasculature are  within normal limits. There is evidence of prior median  sternotomy.      Impression:            1. Chronic changes as above without radiographic evidence of  acute superimposed cardiopulmonary disease.        Electronically signed by:  Linda Wagner MD   4/4/2022 12:52 PM  CDT Workstation: [id number]        I have personally reviewed and interpreted the radiology studies and ECG obtained at time of admission.     Assessment / Plan     Assessment:   Active Hospital Problems    Diagnosis    • **Chest pain    • CAD (coronary artery disease)      -Continue metoprolol and Plavix     • (HFpEF) heart failure with preserved ejection fraction (HCC)      -Continue Lasix, metoprolol, atorvastatin       • Class 3 severe obesity due to excess calories without serious comorbidity with body mass index (BMI) of 40.0 to 44.9 in adult (HCC)    • Restrictive lung disease secondary to obesity    • MAURICIO (generalized anxiety disorder)      Continue Lorazepam 1 mg BID PRN     • Depression, major, recurrent, moderate (HCC)      Continue home Effexor, Remeron, Ativan, Abilify     • GERD without esophagitis      Continue protonix     • Type 2 diabetes mellitus with hyperglycemia, with long-term current use of insulin (HCC)      Continue Gabapentin 800MG TID.    Formatting of this note might be different from the original.  IMO clean-up       Plan:      Chest pain, epigastric pain  History of CABG  -As needed nitro as needed for angina  -Trial of GI cocktail, treat GERD with home PPI  -Monitor telemetry  -Serial troponins  -Serial EKGs as warranted  -Plan to obtain CTA coronary study if serial troponins remain negative, as needed Lopressor to keep heart rate at goal for coronary study    Chronic diastolic heart failure  Home Lasix    Diabetes  Glucose monitoring, continue basal insulin with sliding scale corrective insulin as needed, avoid hypoglycemia    Anxiety  Continue home anxiolytic regimen    DVT prophylaxis Lovenox      Electronically signed by Larry Lopez MD, 04/04/22, 14:21 CDT.

## 2022-04-04 NOTE — ED PROVIDER NOTES
Subjective   Patient presents emergency department with complaint of chest pain throughout the evening which was new onset as well is a low blood pressure reading today when she was having the chest pain in the 90s over 60s.  Patient states her baseline blood pressure 120/80.  Patient notes that her blood pressure has rebounded since that time.  Patient does have a history of diabetes hypertension and known coronary disease for which she had bypass grafting in 2005 with Dr. Fernandez.  Patient's current cardiologist is Dr. Fernandez though has not had recent stress tests or work-ups.  Patient has no nausea with the symptoms and weakness.  No significant shortness of breath.          Review of Systems   Constitutional: Positive for fatigue. Negative for appetite change, chills and fever.   HENT: Negative.  Negative for congestion.    Eyes: Negative.  Negative for photophobia and visual disturbance.   Respiratory: Negative.  Negative for cough, chest tightness and shortness of breath.    Cardiovascular: Positive for chest pain. Negative for palpitations.   Gastrointestinal: Negative.  Negative for abdominal pain, constipation, diarrhea, nausea and vomiting.   Endocrine: Negative.    Genitourinary: Negative.  Negative for decreased urine volume, dysuria, flank pain and hematuria.   Musculoskeletal: Negative.  Negative for arthralgias, back pain, myalgias, neck pain and neck stiffness.   Skin: Negative.  Negative for pallor.   Neurological: Positive for dizziness and weakness. Negative for syncope, light-headedness, numbness and headaches.   Psychiatric/Behavioral: Negative.  Negative for confusion and suicidal ideas. The patient is not nervous/anxious.    All other systems reviewed and are negative.      Past Medical History:   Diagnosis Date   • Angina, class IV (HCC)    • Anxiety    • Anxiety and depression    • Arthritis    • Benign paroxysmal positional vertigo    • Bladder disorder     has bladder stimulator   •  Carpal tunnel syndrome    • CHF (congestive heart failure) ([facility])    • Chronic pain    • Coronary atherosclerosis     hx CABG 2005.  is followed by Dr Kwon   • Depression    • Diabetes mellitus ([facility])     Type 2, controlled   • Diabetic polyneuropathy ([facility])    • Disease of thyroid gland    • Elevated cholesterol    • Female stress incontinence    • Foot pain, left    • Full dentures    • Gastroparesis    • GERD (gastroesophageal reflux disease)    • Hyperlipidemia    • Hypertension    • Low back pain    • Malaise and fatigue    • Multiple joint pain    • Obesity     Refuses to be weighed   • Occlusion and stenosis of bilateral carotid arteries 6/18/2021   • Otalgia     Both   • Perforation of tympanic membrane     Left   • Postoperative wound infection    • Vitamin D deficiency    • Wears glasses     reading       Allergies   Allergen Reactions   • Seroquel [Quetiapine] Anaphylaxis   • Avandia [Rosiglitazone] Swelling   • Morphine And Related Hallucinations   • Oxycodone Hallucinations       Past Surgical History:   Procedure Laterality Date   • ABDOMINAL SURGERY     • AMPUTATION FOOT     • ANGIOPLASTY      coronary   • ANKLE ARTHROSCOPY Left 2/26/2021    Procedure: Left foot hardware removal, ankle arthroscopy, bone grafting , left foot exostectomy;  Surgeon: Ignacio Lord DPM;  Location: [facility] OR;  Service: Podiatry;  Laterality: Left;   • BREAST BIOPSY Right    • CARDIAC CATHETERIZATION     • CARDIAC CATHETERIZATION N/A 6/20/2017    Procedure: Right Heart Cath;  Surgeon: Can Kwon MD PhD;  Location: [facility] CATH INVASIVE LOCATION;  Service:    • CARDIAC CATHETERIZATION N/A 2/18/2020    Procedure: Left Heart Cath;  Surgeon: Catalina Cooper MD;  Location: [facility] CATH INVASIVE LOCATION;  Service: Cardiology;  Laterality: N/A;   • CARPAL TUNNEL RELEASE     • CHOLECYSTECTOMY     • COLONOSCOPY N/A 6/24/2020    Procedure: COLONOSCOPY;  Surgeon: Julián Maldonado MD;  Location: [facility] ENDOSCOPY;   Service: Gastroenterology;  Laterality: N/A;   • CORONARY ARTERY BYPASS GRAFT  2005   • ENDOSCOPY N/A 10/19/2018    Procedure: ESOPHAGOGASTRODUODENOSCOPY possible dilation;  Surgeon: Julián Maldonado MD;  Location: [facility] ENDOSCOPY;  Service: Gastroenterology   • ENDOSCOPY N/A 6/24/2020    Procedure: ESOPHAGOGASTRODUODENOSCOPY WED appt please;  Surgeon: Julián Maldonado MD;  Location: [facility] ENDOSCOPY;  Service: Gastroenterology;  Laterality: N/A;   • ENDOSCOPY AND COLONOSCOPY     • FOOT SURGERY      Toes   • FOOT SURGERY     • GASTRIC BANDING      Revision, laparoscopic   • HYSTERECTOMY     • INCISION AND DRAINAGE LEG Left 3/12/2021    Procedure: Left ankle arthroscopic irrigation and debridement, screw removal;  Surgeon: Ignacio Lord DPM;  Location: [facility] OR;  Service: Podiatry;  Laterality: Left;   • MOUTH SURGERY     • SALPINGO OOPHORECTOMY     • SHOULDER SURGERY     • SUBTALAR ARTHRODESIS Left 1/16/2019    Procedure: LEFT FOOT HARDWARE REMOVAL, FIFTH METATARSAL , OPEN REDUCTION INTERNAL FIXATION, CALCANEAL OSTEOTOMY;  Surgeon: Ignacio Lord DPM;  Location: [facility] OR;  Service: Podiatry   • SUBTALAR ARTHRODESIS Left 10/16/2019    Procedure: foot hardware removal, subtalar joint fusion  possible de/reattachment of achilles tendon        (c-arm);  Surgeon: Ignacio Lord DPM;  Location: [facility] OR;  Service: Podiatry   • SUBTALAR ARTHRODESIS Left 9/30/2020    Procedure: subtalar, talonavicular joint arthrodesis.  Removal hardware.          (c-arm);  Surgeon: Ignacio Lord DPM;  Location: [facility];  Service: Podiatry;  Laterality: Left;   • TRANSESOPHAGEAL ECHOCARDIOGRAM (LAMONTE)      With color flow       Family History   Problem Relation Age of Onset   • Diabetes Other    • Heart disease Other    • Hypertension Other    • Heart disease Mother    • Stroke Mother    • Hypertension Mother    • Diabetes Sister    • Heart disease Sister    • Hypertension Sister    • Heart disease Sister    •  Diabetes Sister    • Hypertension Sister    • Diabetes Sister    • Diabetes Sister    • Diabetes Sister    • Diabetes Sister        Social History     Socioeconomic History   • Marital status:    Tobacco Use   • Smoking status: Never Smoker   • Smokeless tobacco: Never Used   Vaping Use   • Vaping Use: Never used   Substance and Sexual Activity   • Alcohol use: No   • Drug use: No   • Sexual activity: Defer           Objective   Physical Exam  Vitals and nursing note reviewed.   Constitutional:       General: She is not in acute distress.     Appearance: Normal appearance. She is well-developed. She is not ill-appearing or diaphoretic.   HENT:      Head: Normocephalic and atraumatic.      Nose: Nose normal.      Mouth/Throat:      Mouth: Mucous membranes are moist.   Eyes:      General: No scleral icterus.     Extraocular Movements: Extraocular movements intact.      Conjunctiva/sclera: Conjunctivae normal.   Neck:      Vascular: No JVD.   Cardiovascular:      Rate and Rhythm: Normal rate and regular rhythm.      Heart sounds: Normal heart sounds. No murmur heard.    No friction rub. No gallop.   Pulmonary:      Effort: Pulmonary effort is normal. No respiratory distress.      Breath sounds: No wheezing or rales.   Chest:      Chest wall: No tenderness.   Abdominal:      General: There is no distension.      Palpations: Abdomen is soft. There is no mass.      Tenderness: There is no abdominal tenderness. There is no guarding or rebound.   Musculoskeletal:         General: No tenderness or deformity. Normal range of motion.      Cervical back: Normal range of motion and neck supple.   Lymphadenopathy:      Cervical: No cervical adenopathy.   Skin:     General: Skin is warm and dry.      Capillary Refill: Capillary refill takes less than 2 seconds.      Coloration: Skin is not pale.      Findings: No erythema or rash.   Neurological:      General: No focal deficit present.      Mental Status: She is alert and  oriented to person, place, and time.      Cranial Nerves: No cranial nerve deficit.      Motor: No abnormal muscle tone.   Psychiatric:         Behavior: Behavior normal.         Thought Content: Thought content normal.         Judgment: Judgment normal.         Procedures           ED Course                                         Labs Reviewed   COMPREHENSIVE METABOLIC PANEL - Abnormal; Notable for the following components:       Result Value    Glucose 167 (*)     BUN 27 (*)     Creatinine 1.27 (*)     Sodium 132 (*)     Alkaline Phosphatase 131 (*)     eGFR 48.5 (*)     All other components within normal limits    Narrative:     GFR Normal >60  Chronic Kidney Disease <60  Kidney Failure <15     CBC WITH AUTO DIFFERENTIAL - Abnormal; Notable for the following components:    WBC 11.36 (*)     RBC 3.70 (*)     Hemoglobin 11.1 (*)     Hematocrit 32.3 (*)     Neutrophil % 80.5 (*)     Lymphocyte % 9.6 (*)     Neutrophils, Absolute 9.15 (*)     All other components within normal limits   BNP (IN-HOUSE) - Normal    Narrative:     Among patients with dyspnea, NT-proBNP is highly sensitive for the detection of acute congestive heart failure. In addition NT-proBNP of <300 pg/ml effectively rules out acute congestive heart failure with 99% negative predictive value.    Results may be falsely decreased if patient taking Biotin.     TROPONIN (IN-HOUSE) - Normal    Narrative:     Troponin T Reference Range:  <= 0.03 ng/mL-   Negative for AMI  >0.03 ng/mL-     Abnormal for myocardial necrosis.  Clinicians would have to utilize clinical acumen, EKG, Troponin and serial changes to determine if it is an Acute Myocardial Infarction or myocardial injury due to an underlying chronic condition.       Results may be falsely decreased if patient taking Biotin.     LACTIC ACID, PLASMA - Normal   RAINBOW DRAW    Narrative:     The following orders were created for panel order [place] Draw.  Procedure                                Abnormality         Status                     ---------                               -----------         ------                     Green Top (Gel)[[id number]]                                  In process                 Lavender Top[[id number]]                                     In process                 Gold Top - SST[[id number]]                                   In process                 Light Blue Top[[id number]]                                   In process                   Please view results for these tests on the individual orders.   URINALYSIS W/ MICROSCOPIC IF INDICATED (NO CULTURE)   CBC AND DIFFERENTIAL    Narrative:     The following orders were created for panel order CBC & Differential.  Procedure                               Abnormality         Status                     ---------                               -----------         ------                     CBC Auto Differential[[id number]]        Abnormal            Final result                 Please view results for these tests on the individual orders.   GREEN TOP   LAVENDER TOP   GOLD TOP - SST   LIGHT BLUE TOP       XR Chest 1 View   Final Result            1. Chronic changes as above without radiographic evidence of   acute superimposed cardiopulmonary disease.            Electronically signed by:  Linda Wagner MD  4/4/2022 12:52 PM   CDT Workstation: [id number]                [facility]    Final diagnoses:   Chest pain, unspecified type   Weakness   Hypotension, unspecified hypotension type   CHON (acute kidney injury) ([facility])       ED Disposition  ED Disposition     ED Disposition   Decision to Admit    Condition   --    Comment   Level of Care: Telemetry [5]   Diagnosis: Chest pain, unspecified type [[id number]]   Admitting Physician: STEPHANE SERRANO [[id number]]   Attending Physician: STEPHANE SERRANO [[id number]]               No follow-up provider specified.       Medication List      No changes were made to your prescriptions during this visit.          Tiffanie  Diony JUAREZ MD  04/04/22 1409

## 2022-04-05 ENCOUNTER — APPOINTMENT (OUTPATIENT)
Dept: CT IMAGING | Facility: HOSPITAL | Age: 62
End: 2022-04-05

## 2022-04-05 ENCOUNTER — APPOINTMENT (OUTPATIENT)
Dept: ULTRASOUND IMAGING | Facility: HOSPITAL | Age: 62
End: 2022-04-05

## 2022-04-05 ENCOUNTER — APPOINTMENT (OUTPATIENT)
Dept: INTERVENTIONAL RADIOLOGY/VASCULAR | Facility: HOSPITAL | Age: 62
End: 2022-04-05

## 2022-04-05 ENCOUNTER — APPOINTMENT (OUTPATIENT)
Dept: ONCOLOGY | Facility: HOSPITAL | Age: 62
End: 2022-04-05

## 2022-04-05 ENCOUNTER — APPOINTMENT (OUTPATIENT)
Dept: ONCOLOGY | Facility: CLINIC | Age: 62
End: 2022-04-05

## 2022-04-05 PROBLEM — R07.9 CHEST PAIN, UNSPECIFIED TYPE: Status: ACTIVE | Noted: 2022-04-05

## 2022-04-05 PROBLEM — Q24.5 CORONARY ARTERY ABNORMALITY: Status: ACTIVE | Noted: 2022-04-05

## 2022-04-05 PROBLEM — I25.118 CORONARY ARTERY DISEASE OF NATIVE ARTERY OF NATIVE HEART WITH STABLE ANGINA PECTORIS: Status: ACTIVE | Noted: 2018-09-11

## 2022-04-05 LAB
GLUCOSE BLDC GLUCOMTR-MCNC: 146 MG/DL (ref 70–130)
GLUCOSE BLDC GLUCOMTR-MCNC: 173 MG/DL (ref 70–130)
GLUCOSE BLDC GLUCOMTR-MCNC: 216 MG/DL (ref 70–130)
GLUCOSE BLDC GLUCOMTR-MCNC: 323 MG/DL (ref 70–130)

## 2022-04-05 PROCEDURE — 99214 OFFICE O/P EST MOD 30 MIN: CPT | Performed by: INTERNAL MEDICINE

## 2022-04-05 PROCEDURE — 93010 ELECTROCARDIOGRAM REPORT: CPT | Performed by: INTERNAL MEDICINE

## 2022-04-05 PROCEDURE — G0378 HOSPITAL OBSERVATION PER HR: HCPCS

## 2022-04-05 PROCEDURE — 75574 CT ANGIO HRT W/3D IMAGE: CPT

## 2022-04-05 PROCEDURE — 36410 VNPNXR 3YR/> PHY/QHP DX/THER: CPT

## 2022-04-05 PROCEDURE — 93005 ELECTROCARDIOGRAM TRACING: CPT | Performed by: NURSE PRACTITIONER

## 2022-04-05 PROCEDURE — 76937 US GUIDE VASCULAR ACCESS: CPT

## 2022-04-05 PROCEDURE — 97165 OT EVAL LOW COMPLEX 30 MIN: CPT

## 2022-04-05 PROCEDURE — 97162 PT EVAL MOD COMPLEX 30 MIN: CPT

## 2022-04-05 PROCEDURE — 63710000001 INSULIN DETEMIR PER 5 UNITS: Performed by: STUDENT IN AN ORGANIZED HEALTH CARE EDUCATION/TRAINING PROGRAM

## 2022-04-05 PROCEDURE — C1751 CATH, INF, PER/CENT/MIDLINE: HCPCS

## 2022-04-05 PROCEDURE — 82962 GLUCOSE BLOOD TEST: CPT

## 2022-04-05 PROCEDURE — 96361 HYDRATE IV INFUSION ADD-ON: CPT

## 2022-04-05 PROCEDURE — 0 IOPAMIDOL PER 1 ML: Performed by: STUDENT IN AN ORGANIZED HEALTH CARE EDUCATION/TRAINING PROGRAM

## 2022-04-05 PROCEDURE — 63710000001 ONDANSETRON PER 8 MG: Performed by: STUDENT IN AN ORGANIZED HEALTH CARE EDUCATION/TRAINING PROGRAM

## 2022-04-05 RX ORDER — PANTOPRAZOLE SODIUM 20 MG/1
40 TABLET, DELAYED RELEASE ORAL DAILY
Qty: 90 TABLET | Refills: 3 | Status: SHIPPED | OUTPATIENT
Start: 2022-04-05 | End: 2022-07-12 | Stop reason: SDUPTHER

## 2022-04-05 RX ORDER — FAMOTIDINE 20 MG/1
20 TABLET, FILM COATED ORAL 2 TIMES DAILY PRN
Qty: 60 TABLET | Refills: 0 | Status: SHIPPED | OUTPATIENT
Start: 2022-04-05 | End: 2022-06-17 | Stop reason: HOSPADM

## 2022-04-05 RX ORDER — SODIUM CHLORIDE 9 MG/ML
100 INJECTION, SOLUTION INTRAVENOUS CONTINUOUS
Status: DISCONTINUED | OUTPATIENT
Start: 2022-04-05 | End: 2022-04-06

## 2022-04-05 RX ORDER — ISOSORBIDE MONONITRATE 30 MG/1
30 TABLET, EXTENDED RELEASE ORAL
Status: DISCONTINUED | OUTPATIENT
Start: 2022-04-05 | End: 2022-04-07 | Stop reason: HOSPADM

## 2022-04-05 RX ADMIN — METOPROLOL TARTRATE 5 MG: 1 INJECTION, SOLUTION INTRAVENOUS at 07:38

## 2022-04-05 RX ADMIN — ATORVASTATIN CALCIUM 80 MG: 40 TABLET, FILM COATED ORAL at 11:08

## 2022-04-05 RX ADMIN — ASPIRIN 325 MG: 325 TABLET, COATED ORAL at 11:09

## 2022-04-05 RX ADMIN — HYDROCHLOROTHIAZIDE 25 MG: 25 TABLET ORAL at 11:08

## 2022-04-05 RX ADMIN — GABAPENTIN 400 MG: 400 CAPSULE ORAL at 11:08

## 2022-04-05 RX ADMIN — VENLAFAXINE HYDROCHLORIDE 150 MG: 75 CAPSULE, EXTENDED RELEASE ORAL at 21:14

## 2022-04-05 RX ADMIN — METOPROLOL TARTRATE 5 MG: 1 INJECTION, SOLUTION INTRAVENOUS at 08:01

## 2022-04-05 RX ADMIN — IOPAMIDOL 90 ML: 755 INJECTION, SOLUTION INTRAVENOUS at 10:22

## 2022-04-05 RX ADMIN — FOLIC ACID 1000 MCG: 1 TABLET ORAL at 13:32

## 2022-04-05 RX ADMIN — SODIUM CHLORIDE 100 ML/HR: 9 INJECTION, SOLUTION INTRAVENOUS at 15:59

## 2022-04-05 RX ADMIN — PANTOPRAZOLE 20 MG: 20 TABLET, DELAYED RELEASE ORAL at 11:09

## 2022-04-05 RX ADMIN — Medication 10 ML: at 21:14

## 2022-04-05 RX ADMIN — TAMSULOSIN HYDROCHLORIDE 0.4 MG: 0.4 CAPSULE ORAL at 11:09

## 2022-04-05 RX ADMIN — METOPROLOL SUCCINATE 50 MG: 50 TABLET, EXTENDED RELEASE ORAL at 11:08

## 2022-04-05 RX ADMIN — VENLAFAXINE HYDROCHLORIDE 150 MG: 75 CAPSULE, EXTENDED RELEASE ORAL at 11:09

## 2022-04-05 RX ADMIN — ARIPIPRAZOLE 15 MG: 15 TABLET ORAL at 11:08

## 2022-04-05 RX ADMIN — METOPROLOL TARTRATE 5 MG: 1 INJECTION, SOLUTION INTRAVENOUS at 07:50

## 2022-04-05 RX ADMIN — LORAZEPAM 0.5 MG: 0.5 TABLET ORAL at 21:14

## 2022-04-05 RX ADMIN — Medication 10 ML: at 09:21

## 2022-04-05 RX ADMIN — GABAPENTIN 400 MG: 400 CAPSULE ORAL at 15:59

## 2022-04-05 RX ADMIN — FUROSEMIDE 40 MG: 40 TABLET ORAL at 11:08

## 2022-04-05 RX ADMIN — ISOSORBIDE MONONITRATE 30 MG: 30 TABLET, EXTENDED RELEASE ORAL at 15:59

## 2022-04-05 RX ADMIN — METOCLOPRAMIDE 10 MG: 10 TABLET ORAL at 05:31

## 2022-04-05 RX ADMIN — LORAZEPAM 0.5 MG: 0.5 TABLET ORAL at 05:29

## 2022-04-05 RX ADMIN — MIRTAZAPINE 30 MG: 15 TABLET, FILM COATED ORAL at 21:14

## 2022-04-05 RX ADMIN — GABAPENTIN 400 MG: 400 CAPSULE ORAL at 21:14

## 2022-04-05 RX ADMIN — METOPROLOL TARTRATE 5 MG: 1 INJECTION, SOLUTION INTRAVENOUS at 09:27

## 2022-04-05 RX ADMIN — ONDANSETRON HYDROCHLORIDE 8 MG: 4 TABLET, FILM COATED ORAL at 11:08

## 2022-04-05 RX ADMIN — METOPROLOL TARTRATE 5 MG: 1 INJECTION, SOLUTION INTRAVENOUS at 09:37

## 2022-04-05 RX ADMIN — INSULIN DETEMIR 15 UNITS: 100 INJECTION, SOLUTION SUBCUTANEOUS at 21:14

## 2022-04-05 RX ADMIN — Medication 1 TABLET: at 11:09

## 2022-04-05 RX ADMIN — METOPROLOL TARTRATE 5 MG: 1 INJECTION, SOLUTION INTRAVENOUS at 09:20

## 2022-04-05 RX ADMIN — ONDANSETRON HYDROCHLORIDE 8 MG: 4 TABLET, FILM COATED ORAL at 03:00

## 2022-04-05 RX ADMIN — METOPROLOL TARTRATE 5 MG: 1 INJECTION, SOLUTION INTRAVENOUS at 07:33

## 2022-04-05 RX ADMIN — HYDROCODONE BITARTRATE AND ACETAMINOPHEN 1 TABLET: 10; 325 TABLET ORAL at 15:26

## 2022-04-05 RX ADMIN — LORAZEPAM 0.5 MG: 0.5 TABLET ORAL at 13:32

## 2022-04-05 RX ADMIN — DOCUSATE SODIUM 50 MG AND SENNOSIDES 8.6 MG 2 TABLET: 8.6; 5 TABLET, FILM COATED ORAL at 21:14

## 2022-04-05 RX ADMIN — METOCLOPRAMIDE 10 MG: 10 TABLET ORAL at 15:26

## 2022-04-05 RX ADMIN — CLOPIDOGREL BISULFATE 75 MG: 75 TABLET ORAL at 11:09

## 2022-04-05 NOTE — CONSULTS
[facility] Cardiology Consult    Referring Provider: Diony Moreno MD      Reason for Consultation:  Abnormal CCTA in the setting of known CAD    Patient Care Team:  Marty, BEBE Luu as PCP - General (Family Medicine)  Uziel Alonso MD as Consulting Physician (Hematology and Oncology)  Elvis Gamboa APRN as Nurse Practitioner (Endocrinology)  Teressa Hammond APRN as Nurse Practitioner (Oncology)    Chief complaint   Chief Complaint   Patient presents with   • Hypotension   • Chest Pain       Subjective .     History of present illness:     Ms. Martinez is a 62 year old patient who presents to [facility] ER with chest pressure associated with a lower than normal BP. Pain was described as intermittent. There is not an exertional component to the pain She does some epigastric discomfort and nausea.   She saw Dr. Snowden in January 2022. She had an echo that was without changes in February.     Pressure started Sunday night in epigastric region. Was unable to sleep. She has associated nausea with it.   Zofran is not helping.       Review of Systems  Review of Systems   Constitutional: Negative for diaphoresis, fatigue, fever and unexpected weight change.   Respiratory: Negative for cough, chest tightness, shortness of breath and wheezing.    Cardiovascular: Negative for chest pain, palpitations and leg swelling.   Gastrointestinal: Negative for abdominal distention and blood in stool.   Genitourinary: Negative for hematuria.   Musculoskeletal: Negative for arthralgias and myalgias.   Skin: Negative for pallor and rash.   Neurological: Negative for dizziness, syncope and weakness.   Hematological: Does not bruise/bleed easily.   Psychiatric/Behavioral: Negative for confusion. The patient is not nervous/anxious.        History  Past Medical History:   Diagnosis Date   • Angina, class IV (HCC)    • Anxiety    • Anxiety and depression    • Arthritis    • Benign paroxysmal positional  vertigo    • Bladder disorder     has bladder stimulator   • Carpal tunnel syndrome    • CHF (congestive heart failure) ([facility])    • Chronic pain    • Coronary atherosclerosis     hx CABG 2005.  is followed by Dr Kwon   • Depression    • Diabetes mellitus ([facility])     Type 2, controlled   • Diabetic polyneuropathy ([facility])    • Disease of thyroid gland    • Elevated cholesterol    • Female stress incontinence    • Foot pain, left    • Full dentures    • Gastroparesis    • GERD (gastroesophageal reflux disease)    • Hyperlipidemia    • Hypertension    • Low back pain    • Malaise and fatigue    • Multiple joint pain    • Obesity     Refuses to be weighed   • Occlusion and stenosis of bilateral carotid arteries 6/18/2021   • Otalgia     Both   • Perforation of tympanic membrane     Left   • Postoperative wound infection    • Vitamin D deficiency    • Wears glasses     reading   ,   Past Surgical History:   Procedure Laterality Date   • ABDOMINAL SURGERY     • AMPUTATION FOOT     • ANGIOPLASTY      coronary   • ANKLE ARTHROSCOPY Left 2/26/2021    Procedure: Left foot hardware removal, ankle arthroscopy, bone grafting , left foot exostectomy;  Surgeon: Ignacio Lord DPM;  Location: [facility] OR;  Service: Podiatry;  Laterality: Left;   • BREAST BIOPSY Right    • CARDIAC CATHETERIZATION     • CARDIAC CATHETERIZATION N/A 6/20/2017    Procedure: Right Heart Cath;  Surgeon: Can Kwon MD PhD;  Location: [facility] CATH INVASIVE LOCATION;  Service:    • CARDIAC CATHETERIZATION N/A 2/18/2020    Procedure: Left Heart Cath;  Surgeon: Catalina Cooper MD;  Location: [facility] CATH INVASIVE LOCATION;  Service: Cardiology;  Laterality: N/A;   • CARPAL TUNNEL RELEASE     • CHOLECYSTECTOMY     • COLONOSCOPY N/A 6/24/2020    Procedure: COLONOSCOPY;  Surgeon: Julián Maldonado MD;  Location: [facility] ENDOSCOPY;  Service: Gastroenterology;  Laterality: N/A;   • CORONARY ARTERY BYPASS GRAFT  2005   • ENDOSCOPY N/A 10/19/2018     Procedure: ESOPHAGOGASTRODUODENOSCOPY possible dilation;  Surgeon: Julián Maldonado MD;  Location: [facility] ENDOSCOPY;  Service: Gastroenterology   • ENDOSCOPY N/A 6/24/2020    Procedure: ESOPHAGOGASTRODUODENOSCOPY WED appt please;  Surgeon: Julián Maldonado MD;  Location: [facility] ENDOSCOPY;  Service: Gastroenterology;  Laterality: N/A;   • ENDOSCOPY AND COLONOSCOPY     • FOOT SURGERY      Toes   • FOOT SURGERY     • GASTRIC BANDING      Revision, laparoscopic   • HYSTERECTOMY     • INCISION AND DRAINAGE LEG Left 3/12/2021    Procedure: Left ankle arthroscopic irrigation and debridement, screw removal;  Surgeon: Ignacio Lord DPM;  Location: [facility] OR;  Service: Podiatry;  Laterality: Left;   • MOUTH SURGERY     • SALPINGO OOPHORECTOMY     • SHOULDER SURGERY     • SUBTALAR ARTHRODESIS Left 1/16/2019    Procedure: LEFT FOOT HARDWARE REMOVAL, FIFTH METATARSAL , OPEN REDUCTION INTERNAL FIXATION, CALCANEAL OSTEOTOMY;  Surgeon: Ignacio Lord DPM;  Location: [facility] OR;  Service: Podiatry   • SUBTALAR ARTHRODESIS Left 10/16/2019    Procedure: foot hardware removal, subtalar joint fusion  possible de/reattachment of achilles tendon        (c-arm);  Surgeon: Ignacio Lord DPM;  Location: [facility] OR;  Service: Podiatry   • SUBTALAR ARTHRODESIS Left 9/30/2020    Procedure: subtalar, talonavicular joint arthrodesis.  Removal hardware.          (c-arm);  Surgeon: Ignacio Lord DPM;  Location: [facility] OR;  Service: Podiatry;  Laterality: Left;   • TRANSESOPHAGEAL ECHOCARDIOGRAM (LAMONTE)      With color flow   ,   Family History   Problem Relation Age of Onset   • Diabetes Other    • Heart disease Other    • Hypertension Other    • Heart disease Mother    • Stroke Mother    • Hypertension Mother    • Diabetes Sister    • Heart disease Sister    • Hypertension Sister    • Heart disease Sister    • Diabetes Sister    • Hypertension Sister    • Diabetes Sister    • Diabetes Sister    • Diabetes Sister    • Diabetes  Sister    ,   Social History     Tobacco Use   • Smoking status: Never Smoker   • Smokeless tobacco: Never Used   Vaping Use   • Vaping Use: Never used   Substance Use Topics   • Alcohol use: No   • Drug use: No   ,   Medications Prior to Admission   Medication Sig Dispense Refill Last Dose   • Accu-Chek Guide test strip USE AS INSTRUCTED 100 each 3    • ARIPiprazole (ABILIFY) 15 MG tablet TAKE 1 TABLET BY MOUTH EVERY DAY 90 tablet 1    • aspirin  MG tablet Take 1 tablet by mouth Daily. 30 tablet 0    • atorvastatin (LIPITOR) 80 MG tablet TAKE 1 TABLET BY MOUTH EVERY DAY 90 tablet 1    • BD SHARPS CONTAINER HOME misc 1 each Take As Directed. 1 each 0    • Blood Glucose Monitoring Suppl (ONE TOUCH ULTRA MINI) w/Device kit Patient will need the Accu-Check Avitio meter 1 each 0    • Calcium Citrate-Vitamin D (CITRACAL/VITAMIN D) 250-200 MG-UNIT tablet Take 2 tablets by mouth 2 (two) times a day.      • clopidogrel (PLAVIX) 75 MG tablet Take 1 tablet by mouth Daily. 90 tablet 1    • cyanocobalamin 1000 MCG/ML injection INJECT 1 ML INTO THE APPROPRIATE MUSCLE AS DIRECTED BY PRESCRIBER EVERY 28 (TWENTY-EIGHT) DAYS. 3 mL 2    • folic acid (FOLVITE) 1 MG tablet TAKE 1 TABLET BY MOUTH EVERY DAY 90 tablet 1    • furosemide (LASIX) 40 MG tablet TAKE 1 TABLET BY MOUTH EVERY DAY 90 tablet 1    • gabapentin (NEURONTIN) 400 MG capsule Take 1 capsule by mouth 3 (Three) Times a Day. 90 capsule 2    • glucose monitor monitoring kit 1 each Daily. accucheck eve meter, E11.9 1 each 0    • hydroCHLOROthiazide (HYDRODIURIL) 25 MG tablet TAKE 1 TABLET BY MOUTH EVERY DAY 90 tablet 0    • HYDROcodone-acetaminophen (NORCO) 7.5-325 MG per tablet Take 1 tablet by mouth Every 6 (Six) Hours As Needed for Moderate Pain . 120 tablet 0    • insulin aspart (NovoLOG FlexPen) 100 UNIT/ML solution pen-injector sc pen Inject 0 to 14 units under the skin into the appropriate area 3 (three) times daily before meals according to sliding scale on  "attached sheet. 15 mL 3    • Insulin Glargine (BASAGLAR KWIKPEN) 100 UNIT/ML injection pen Inject 60 Units under the skin into the appropriate area as directed 2 (Two) Times a Day. 120 mL 11    • Insulin Pen Needle (B-D ULTRAFINE III SHORT PEN) 31G X 8 MM misc USE TO INJECT 5 TIMES A  each 11    • Insulin Pen Needle 31G X 8 MM misc Use up to 4 (four) times daily with insulin as directed. 100 each 0    • Lancets (accu-chek soft touch) lancets As directed 100 each 12    • lisinopril (PRINIVIL,ZESTRIL) 20 MG tablet TAKE 1 TABLET BY MOUTH EVERY DAY 90 tablet 1    • LORazepam (ATIVAN) 0.5 MG tablet Take 1 tablet by mouth Every 8 (Eight) Hours. 90 tablet 0    • meclizine (ANTIVERT) 25 MG tablet Take 1 tablet by mouth 3 (Three) Times a Day As Needed for dizziness. 90 tablet 6    • methocarbamol (ROBAXIN) 500 MG tablet TAKE 1 TABLET BY MOUTH 2 (TWO) TIMES A DAY AS NEEDED FOR MUSCLE SPASMS. 60 tablet 5    • metoclopramide (REGLAN) 10 MG tablet TAKE 1 TABLET BY MOUTH 4 (FOUR) TIMES A DAY AS NEEDED (NAUSEA). 120 tablet 1    • metoprolol succinate XL (TOPROL-XL) 50 MG 24 hr tablet TAKE 1 TABLET BY MOUTH DAILY. DOSE INCREASED 5/24/21 90 tablet 1    • mirtazapine (REMERON) 30 MG tablet TAKE 1 TABLET BY MOUTH EVERY DAY AT NIGHT 90 tablet 1    • naloxone (NARCAN) 0.4 MG/ML injection Infuse 0.5 mL into a venous catheter Every 5 (Five) Minutes As Needed for Opioid Reversal.      • Needle, Disp, (Hypodermic Needle) 20G X 1\" misc 1 each Every 28 (Twenty-Eight) Days. For drawing up B12 for injection monthly, change back to smaller gauge needle prior to injection. Dx Vitamin B12  Deficiency. 10 each 2    • nitroglycerin (NITROSTAT) 0.4 MG SL tablet Place 1 tablet under the tongue Every 5 (Five) Minutes As Needed for Chest Pain. Take no more than 3 doses in 15 minutes. 20 tablet 11    • NovoLOG 100 UNIT/ML injection INJECT 8 UNITS SUBCUTANEOUSLY 3 TIMES DAILY WITH MEALS. 20 mL 3    • ondansetron (ZOFRAN) 8 MG tablet TAKE 1 TABLET " "BY MOUTH EVERY 8 (EIGHT) HOURS AS NEEDED FOR NAUSEA OR VOMITING. 18 tablet 1    • Syringe/Needle, Disp, (Luer Lock Safety Syringes) 25G X 5/8\" 3 ML misc 1 each Every 28 (Twenty-Eight) Days. 1 Q28 DAYS for injection B12/ cyanocobalomin. Dx vitamin D deficiency. 10 each 2    • venlafaxine XR (EFFEXOR-XR) 150 MG 24 hr capsule Take 1 capsule by mouth 2 (Two) Times a Day. 90 capsule 1    • vitamin D (ERGOCALCIFEROL) 1.25 MG (71123 UT) capsule capsule TAKE ONE CAPSULE BY MOUTH EVERY SUNDAY. 36 capsule 1    • [DISCONTINUED] pantoprazole (PROTONIX) 20 MG EC tablet Take 1 tablet by mouth Daily. 90 tablet 3       ALLERGIES  Seroquel [quetiapine], Avandia [rosiglitazone], Morphine and related, and Oxycodone    The following portions of the patient's history were reviewed and updated as appropriate: allergies, current medications, past family history, past medical history, past social history, past surgical history and problem list.     Old and outside records reviewed (if available) and pertinent information is included in the objective data.     Objective     Vital Sign Min/Max for last 24 hours  Temp  Min: 96.8 °F (36 °C)  Max: 97.4 °F (36.3 °C)   BP  Min: 105/51  Max: 147/64   Pulse  Min: 73  Max: 99   Resp  Min: 18  Max: 18   SpO2  Min: 91 %  Max: 98 %   No data recorded   Weight  Min: 123 kg (270 lb 4.8 oz)  Max: 123 kg (271 lb 3.2 oz)     Flowsheet Rows    Flowsheet Row First Filed Value   Admission Height 172.7 cm (68\") Documented at 04/04/2022 1203   Admission Weight 109 kg (240 lb) Documented at 04/04/2022 1203             Physical Exam:  Physical Exam  Vitals and nursing note reviewed.   Constitutional:       General: She is not in acute distress.     Appearance: She is well-developed. She is not diaphoretic.   HENT:      Head: Normocephalic.   Eyes:      Conjunctiva/sclera: Conjunctivae normal.   Neck:      Vascular: No JVD.   Cardiovascular:      Rate and Rhythm: Normal rate and regular rhythm.      Heart sounds: " Normal heart sounds, S1 normal and S2 normal. No murmur heard.    No friction rub. No gallop.   Pulmonary:      Effort: Pulmonary effort is normal. No respiratory distress.      Breath sounds: Normal breath sounds. No wheezing or rales.   Abdominal:      General: Bowel sounds are normal. There is no distension.      Palpations: Abdomen is soft.   Musculoskeletal:         General: Normal range of motion.   Skin:     General: Skin is warm and dry.      Findings: No erythema.   Neurological:      Mental Status: She is alert and oriented to person, place, and time.   Psychiatric:         Behavior: Behavior normal.         Thought Content: Thought content normal.         Judgment: Judgment normal.            Results Reviewed by myself:     Results from last 7 days   Lab Units 04/04/22  1306   SODIUM mmol/L 132*   POTASSIUM mmol/L 4.5   CHLORIDE mmol/L 98   CO2 mmol/L 23.0   BUN mg/dL 27*   CREATININE mg/dL 1.27*   CALCIUM mg/dL 8.9   BILIRUBIN mg/dL 0.4   ALK PHOS U/L 131*   ALT (SGPT) U/L 15   AST (SGOT) U/L 12   GLUCOSE mg/dL 167*       Estimated Creatinine Clearance: 65.1 mL/min (A) (by C-G formula based on SCr of 1.27 mg/dL (H)).          Results from last 7 days   Lab Units 04/04/22  1306   WBC 10*3/mm3 11.36*   HEMOGLOBIN g/dL 11.1*   PLATELETS 10*3/mm3 315             Lab Results   Component Value Date    CKTOTAL 38 02/24/2018    CKMB 1.00 02/24/2018    TROPONINI <0.012 07/22/2018    TROPONINT <0.010 04/04/2022     Lab Results   Component Value Date    PROBNP 236.4 04/04/2022       I/O last 3 completed shifts:  In: 240 [P.O.:240]  Out: -     Cardiographics  ECG/EMG Results (last 24 hours)     Procedure Component Value Units Date/Time    SCANNED EKG [[id number]] Resulted: 04/04/22     Updated: 04/05/22 1318          Results for orders placed during the hospital encounter of 02/05/22    Adult Transthoracic Echo Complete W/ Cont if Necessary Per Protocol    Interpretation Summary  · The study is technically difficult  for diagnosis. The quality of the study is limited due to patient body habitus. Verbal consent was obtained from the patient to use Definity image enhancer in order to optimize the study.  · Left ventricular wall thickness is consistent with moderate concentric hypertrophy.  · Estimated left ventricular EF = 63% Left ventricular ejection fraction appears to be 61 - 65%. Left ventricular systolic function is normal.  · Left ventricular diastolic function is consistent with (grade Ia w/high LAP) impaired relaxation.  · Saline test results are negative.  · Estimated right ventricular systolic pressure from tricuspid regurgitation is normal (<35 mmHg).  · No definite LV mass or thrombus noted on the study      US Guided Vascular Access    Result Date: 4/5/2022  Impression: Please see above. Electronically signed by:  Perfecto Alfred MD  4/5/2022 9:41 AM CDT Workstation: [id number]    XR Chest 1 View    Result Date: 4/4/2022  1. Chronic changes as above without radiographic evidence of acute superimposed cardiopulmonary disease. Electronically signed by:  Linda Wagner MD  4/4/2022 12:52 PM CDT Workstation: [id number]    CT Angiogram Coronary    Result Date: 4/5/2022  Abnormal CT coronary arteriogram. 1. There is a LIMA to LAD graft. The graft is very thin, very poor flow and only faintly visualized (nonfunctional) 2. There are stents in the LAD and D1 branch. The LAD and D1 branch distal to the stents are occluded. 3. Normal circumflex. 4. Calcified plaque proximal right coronary artery causing narrowing of 50-60%. Calcium score 933. (Known history of coronary artery disease, stents and bypass surgery.) Normal functional analysis. Computer-assisted calculation of left ventricular ejection fraction 67%. Electronically signed by:  Naveed Taylor MD  4/5/2022 1:51 PM CDT Workstation: [id number]      Intake/Output       04/04/22 0700 - 04/05/22 0659 04/05/22 0700 - 04/06/22 0659    Intake (ml) 240 240    Output (ml) -- --     Net (ml) 240 240    Last Weight 123 kg (271 lb 3.2 oz) --            Assessment/Plan       Coronary artery disease of native artery of native heart with stable angina pectoris (HCC)  - CCTA preformed ordered by the hospitalist. Patient has known CAD patient of Dr. Snowden. Last [facility] was 2020 with Dr. Cooper.   - [facility] indicated to assess patency of stents that were mentioned to be occluded on radiology reading.   - NPO after breakfast with C tomorrow afternoon with Dr. Navas.  - NS at 100ml/hr since she received IV contrast today.   - negative trop and BNP. No high risk findings on EKG.       The indications, risks and benefits of diagnostic left heart cardiac catheterization, angiography, conscious sedation, and possible blood transfusion were discussed in detail with the patient. The potential complications of 1/2000 chance of death, 1/1000 chance of heart attack or stroke, 1/500 chance of bleeding or clotting of the femoral artery, and 1/500 chance of allergic reaction to contrast were discussed. We also reviewed possible complications of infection and kidney dysfunction. If PCI were performed and intra-coronary stents indicated, we discussed the details about CASIMIRO. This included a review of the risks of the infrequent, but relatively higher incidence of late thrombosis with CASIMIRO. The importance of maintaining a consistent daily regimen of aspirin and an additional anti-platelet agent for as long as directed after implantation was emphasized. No contraindications were found.  The patient appeared to understand and agreed to the above.  [facility] with Dr. Sultan Danny solorio     Obtain consent for: Left heart catheterization, Coronary angiography, Graft angiography, In-situ LIMA angiography, Left ventriculography, Intravascular ultrasound, Optical coherence tomography, Flow wire, Balloon Angioplasty, Coronary stent, Graft stent, Aortography, Iliofemoral angiography, Device closure, femoral artery, Intra-aortic balloon  pump, Impella LV assist device implantation, Transvenous pacemaker, Pericardiocentesis and Subclavian angiography      Disposition: per findings.       I discussed the patient's findings and my recommendations with patient and Dr. Navas            This document has been electronically signed by BEBE Abbott on April 5, 2022 14:37 CDT      Electronically signed by BEBE Abbott, 04/05/22, 2:34 PM CDT.        I personally saw and examined Ariana Martinez after the APRN.  I personally performed a history and physical examination of the patient.  I personally reviewed independent findings and plan of care.  I discussed management with the APRN.  I agree with the APRN's documentation.    62-year-old female with known history of coronary artery disease, congestive heart failure preserved ejection fraction, hypertension, diabetes, she has known extensive history of coronary artery disease status post CABG with a LIMA to LAD which is known to be atretic.  She had a PCI done from her LAD into diagonal in 2020, RCA had borderline disease.  She presented to hospital with chest pain.  Was ruled out for acute coronary syndrome.  Subsequently a CT coronary angiogram was performed which showed possible occlusion of her recently placed stents in the LAD/diagonal.  On my visit patient is lying comfortably without any acute distress.  Vitals:    04/05/22 0745 04/05/22 0800 04/05/22 1113 04/05/22 1526   BP: 115/56  147/64 95/48   BP Location: Right arm  Left arm Left arm   Patient Position: Lying  Lying Lying   Pulse: 73 73 80 76   Resp: 18  18 18   Temp: 96.8 °F (36 °C)  97.4 °F (36.3 °C) 97.4 °F (36.3 °C)   TempSrc: Temporal  Temporal Temporal   SpO2: 93%  96% 91%   Weight:       Height:         Alert oriented x3, no acute distress  S1 plus S2 heart sounds    Labs EKG and telemetry reviewed.    Assessment plan:  1.  Coronary artery disease: Extensive history of coronary artery disease as above.  Has presented with  recurrent chest pain despite being optimal medical therapy with aspirin/Plavix/Imdur/metoprolol    CT concerning for progression of disease.  Explained options including medical management versus invasive evaluation with the patient.  In setting of recurrent chest pain patient opted for invasive evaluation.  [facility] tomorrow    [facility] Pre-Op:    Invasive coronary angiography was recommended to the patient.  The patientdenies  bleeding issues. The patient reports use of antiplatelet agents.  The patient reports CKD. The patient denies  contrast allergy. The patient reports use of diabetes medications.    Pre-Cath Surgical History: CABG with LIMA to LAD,     The indications, risks/benefits and alternatives of diagnostic left heart cardiac catheterization, angiography, conscious sedation, and possible blood transfusion were discussed in detail with the patient. The potential complications of 1/2000 chance of death, 1/1000 chance of heart attack or stroke, 1/500 chance of bleeding or clotting of the femoral artery, and 1/500 chance of allergic reaction to contrast were discussed. We also reviewed possible complications of infection and kidney dysfunction. If PCI were performed and intra-coronary stents indicated, we discussed the details about CASIMIRO. This included a review of the risks of the infrequent, but relatively higher incidence of late thrombosis with CASIMIRO. The importance of maintaining a consistent daily regimen of aspirin and an additional anti-platelet agent for as long as directed after implantation was emphasized. No contraindications were found. The patient  appeared to understand and agreed to the above.  -Left heart catheterization, Coronary angiography, Graft angiography, In-situ LIMA angiography, Left ventriculography, Intravascular ultrasound, Optical coherence tomography, Flow wire, Balloon Angioplasty, Coronary stent, Graft stent, Aortography, Iliofemoral angiography, Device closure, femoral artery,  Intra-aortic balloon pump, Impella LV assist device implantation, Transvenous pacemaker, Pericardiocentesis and Subclavian angiography  -hold oral diabetic medications the morning of surgery      ASA Class: III  Mallampati Score: II    Contraindications to DAPT: None              Electronically signed by Sheryl Navas MD, 04/05/22, 5:12 PM CDT.

## 2022-04-05 NOTE — PLAN OF CARE
Goal Outcome Evaluation:  Plan of Care Reviewed With: patient        Progress: improving  Outcome Evaluation: New admit this shift. VSS. Pt NPO since midnight. Awaiting echo and cta this AM. No reports of chest pain this shift. Pt does endorse some nausea this AM. Nausea meds given per orders. See MAR for details. No acute events overnight.

## 2022-04-05 NOTE — PROGRESS NOTES
[facility] Medicine   INPATIENT PROGRESS NOTE      Patient Name: Ariana Martinez  Date of Admission: 4/4/2022  Today's Date: 04/05/22  Length of Stay: 0  Primary Care Physician: Kimberly Ferguson APRN    Subjective   Chief Complaint: Chest pain    HPI   62-year-old female with morbid obesity, CAD with history of CABG in 2005, PAD, left BKA, chronic diastolic heart failure, chronic back pain, anxiety/depression, GERD, diabetes with neuropathy, presented to the ED with chest pain described as intermittent pressure that started yesterday evening.  Describes some radiation to the back, with some associated nausea and epigastric discomfort.  No diaphoresis and no associated shortness of breath.  She tells me she had an echo fairly recently with her cardiologist outpatient, however she has not had any recent ischemic evaluation.  Initial work-up in the ED included EKG without overt ischemic changes and troponin negative.  No evidence of volume retention or volume overload with normal proBNP and no acute findings on chest x-ray.  Patient admitted for further monitoring.  No acute events on telemetry.  Serial troponins remain negative.  Obtained CTA coronary study which returned abnormal, so cardiology consulted.      Patient still describes some chest and epigastric discomfort today, although symptoms have improved some with Zofran.  Denies any worsening shortness of breath from baseline, palpitations, fever/chills, vomiting or diarrhea.              Review of Systems     14 point review of systems completed and is negative except as otherwise noted    Objective    Temp:  [96.8 °F (36 °C)-97.4 °F (36.3 °C)] 97.4 °F (36.3 °C)  Heart Rate:  [73-99] 80  Resp:  [18] 18  BP: (105-147)/(51-67) 147/64  Physical Exam  General: Resting in no acute distress  Psych: Normal mood and affect, calm and cooperative  HEENT: NC/AT, no scleral icterus  Cardiovascular: Normal rate,  regular rhythm, pulses 2+  Respiratory: Diminished, no wheezes rales or rhonchi  Abdomen: Soft, minimal epigastric tenderness without guarding, bowel sounds present  Neurologic: Alert, follows commands, normal speech  Musculoskeletal: Left BKA  Extremities: No clubbing or cyanosis  Skin: Warm dry, well perfused      Results Review:  I have reviewed the labs, radiology results, and diagnostic studies.    Laboratory Data:   Results from last 7 days   Lab Units 04/04/22  1306   WBC 10*3/mm3 11.36*   HEMOGLOBIN g/dL 11.1*   HEMATOCRIT % 32.3*   PLATELETS 10*3/mm3 315        Results from last 7 days   Lab Units 04/04/22  1306   SODIUM mmol/L 132*   POTASSIUM mmol/L 4.5   CHLORIDE mmol/L 98   CO2 mmol/L 23.0   BUN mg/dL 27*   CREATININE mg/dL 1.27*   CALCIUM mg/dL 8.9   BILIRUBIN mg/dL 0.4   ALK PHOS U/L 131*   ALT (SGPT) U/L 15   AST (SGOT) U/L 12   GLUCOSE mg/dL 167*       Culture Data:   No results found for: BLOODCX  No results found for: URINECX  No results found for: RESPCX  No results found for: WOUNDCX  No results found for: STOOLCX  No components found for: BODYFLD    Radiology Data:   Imaging Results (Last 24 Hours)     Procedure Component Value Units Date/Time    CT Angiogram Coronary [[id number]] Collected: 04/05/22 1003     Updated: 04/05/22 1353    Narrative:      CT coronary arteriogram. Calcium score. Functional analysis.      CLINICAL HISTORY: 62-year-old female presenting with chest pain.        COMPARISON: Chest x-ray April 4, 2022.    Post processing was performed by the radiologist at the [employer]  workstation. Serial axial CT images were obtained through the  heart at 3 mm thickness without contrast for calcium scoring. 3D  images including vessel probing technique were also obtained.  Subsequently, following the intravenous administration of 90     ml of Isovue-370   , serial axial CT images were obtained through  the heart at 0.6 mm thickness utilizing retrospective gating.  Images were obtained after  premedication with five    mg of  metoprolol IV, x3. Heart rate 78-82.  Full field of view  reconstructed images were used for evaluation of the extracardiac  tissues.    This exam was performed according to our departmental  dose-optimization program, which includes automated exposure  control, adjustment of the mA and/or kV according to patient size  and/or use of iterative reconstruction technique.      CALCIUM PLAQUE BURDEN:    REGION                                         CALCIUM SCORE  ([place])  Left Main                                                       133      Right Coronary Artery                                 50   Left Anterior Descending                            750   Circumflex                                                    0       Posterior Descending Artery                       0             Your Calcium Score is 933   .      This places the patent in the high    risk for coronary artery  disease. (Extensive atherosclerotic plaque. High likelihood of at  least one significant coronary narrowing.) Known history of  coronary artery disease and bypass surgery.    CTA OF THE CORONARY ARTERIES:  Left Main: There is some calcified plaque without stenosis.      There is a MESSINA to LAD graft. The graft is very thin, very poor  flow and only faintly visualized. (Nonfunctional).    There are stents in the LAD and stents in the D1 branch.  The distal LAD and D1 branch are completely occluded.      Circumflex: No calcified or soft tissue density plaque and no  stenosis.  RCA: Calcified plaque proximal right coronary artery causing  narrowing of 50-60%. Right coronary artery dominant.      The aortic valve is tricuspid. There is no myocardial bridging.  On short axis views, the myocardium is homogeneous in thickness.    EXTRACARDIAC SOFT TISSUES:  Mediastinum: On the imaging, the ascending and descending aorta  are normal in caliber. There is no mediastinal or hilar  lymphadenopathy. The pericardium  is normal.  Lungs: No consolidation or focal nodules are present. There is no  pneumothorax or effusion. The pulmonary arteries are normal in  appearance.  Abdomen: Prior cholecystectomy. No evidence of hiatal hernia. The  imaged liver and spleen are unremarkable.     Bones: There are no lytic or blastic lesions within the osseous  structures.    CT FUNCTIONAL ANALYSIS:  Ejection Fraction      67    %  Diastolic Volume      134    ml  Systolic Volume       44    ml  Stroke Volume         90    ml  Cardiac Output        7.2    L/minute      Impression:      Abnormal CT coronary arteriogram.  1. There is a LIMA to LAD graft. The graft is very thin, very  poor flow and only faintly visualized (nonfunctional)  2. There are stents in the LAD and D1 branch. The LAD and D1  branch distal to the stents are occluded.  3. Normal circumflex.  4. Calcified plaque proximal right coronary artery causing  narrowing of 50-60%.    Calcium score 933. (Known history of coronary artery disease,  stents and bypass surgery.)    Normal functional analysis. Computer-assisted calculation of left  ventricular ejection fraction 67%.    Electronically signed by:  Naveed Taylor MD  4/5/2022 1:51 PM CDT  Workstation: [id number]    US Guided Vascular Access [[id number]] Collected: 04/05/22 0849     Updated: 04/05/22 0943    Narrative:      PROCEDURE: Ultrasound Guidance Vascular Access      Ordering physician(s): LATESHA BATES    Clinical Indication: Right midline      Findings:    The vessel was sonographically evaluated and determined to be  patent. Concurrent realtime ultrasound was used to visualize  needle entry into the right basilic vein and a permanent image  for permanent recording and reporting.         Impression:      Impression: Please see above.    Electronically signed by:  Perfecto Alfred MD  4/5/2022 9:41 AM CDT  Workstation: [id number]    IR Insert Midline Without Port Pump 5 Plus [[id number]] Resulted: 04/05/22 0916      Updated: 04/05/22 0916    Narrative:      This procedure was auto-finalized with no dictation required.          I have reviewed the patient's current medications.     Assessment/Plan     Active Hospital Problems    Diagnosis    • **Chest pain    • Chest pain, unspecified type    • Coronary artery abnormality    • CAD (coronary artery disease)      -Continue metoprolol and Plavix     • (HFpEF) heart failure with preserved ejection fraction (HCC)      -Continue Lasix, metoprolol, atorvastatin       • Class 3 severe obesity due to excess calories without serious comorbidity with body mass index (BMI) of 40.0 to 44.9 in adult (HCC)    • Restrictive lung disease secondary to obesity    • MAURICIO (generalized anxiety disorder)      Continue Lorazepam 1 mg BID PRN     • Depression, major, recurrent, moderate (HCC)      Continue home Effexor, Remeron, Ativan, Abilify     • GERD without esophagitis      Continue protonix     • Type 2 diabetes mellitus with hyperglycemia, with long-term current use of insulin (HCC)      Continue Gabapentin 800MG TID.    Formatting of this note might be different from the original.  IMO clean-up         Chest pain with known CAD, remote history of CABG  CTA coronary study returned abnormal  Cardiology consult  Follow telemetry  Serial troponins negative  Serial EKGs as warranted  As needed nitro for any anginal chest pain  Symptomatic treatment as needed for other potential causes of chest pain    Chronic diastolic heart failure  Home Lasix    Diabetes  Glucose monitoring, will continue basal insulin with adjustment as needed  Continue corrective sliding scale insulin as warranted  Avoid hypoglycemia    Anxiety  Continue home anxiolytic regimen    DVT prophylaxis Lovenox    Disposition: TBD pending further cardiology recommendations    Electronically signed by Larry Lopez MD, 04/05/22, 14:18 CDT.

## 2022-04-05 NOTE — THERAPY EVALUATION
Patient Name: Ariana Martinez  : 1960    MRN: [id number]                              Today's Date: 2022     PT Evaluation  Admit Date: 2022    Visit Dx:     ICD-10-CM ICD-9-CM   1. Chest pain, unspecified type  R07.9 786.50   2. Weakness  R53.1 780.79   3. Hypotension, unspecified hypotension type  I95.9 458.9   4. CHON (acute kidney injury) ([facility])  N17.9 584.9   5. GERD without esophagitis  K21.9 530.81   6. Coronary artery abnormality  Q24.5 746.85   7. Impaired mobility and ADLs  Z74.09 V49.89    Z78.9    8. Impaired functional mobility, balance, gait, and endurance  Z74.09 V49.89     Patient Active Problem List   Diagnosis   • 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, with long-term current use of insulin   • Closed nondisplaced fracture of fifth left metatarsal bone   • MAURICIO (generalized anxiety disorder)   • Depression, major, recurrent, moderate ([facility])   • GERD without esophagitis   • Long term prescription opiate use   • Mixed hyperlipidemia   • Vitamin D deficiency   • Seasonal allergic rhinitis   • Restrictive lung disease secondary to obesity   • Adult body mass index 37.0-37.9   • Snoring   • Class 3 severe obesity due to excess calories without serious comorbidity with body mass index (BMI) of 40.0 to 44.9 in adult ([facility])   • (HFpEF) heart failure with preserved ejection fraction ([facility])   • Type 2 diabetes mellitus with hyperglycemia, with long-term current use of insulin ([facility])   • Cyanocobalamin deficiency   • Coronary artery disease of native artery of native heart with stable angina pectoris ([facility])   • Hypertension   • Meniere's disease   • Gastroparesis   • Otitis media with effusion, left   • Pulmonary hypertension ([facility])   • Displaced fracture of fifth metatarsal bone, left foot, subsequent encounter for fracture with nonunion   • Pes cavus   • Primary osteoarthritis involving multiple joints   • Generalized anxiety disorder   • Chronic right-sided low back pain  with right-sided sciatica   • Chest pain   • Thrombocytosis   • Leukocytosis   • Iron deficiency   • Adverse effect of iron   • Hindfoot varus, acquired, left   • Chest pain due to myocardial ischemia   • Nonrheumatic tricuspid valve regurgitation   • Iron deficiency anemia due to chronic blood loss   • Malaise and fatigue   • Nonunion of subtalar arthrodesis   • Ankle arthritis   • Cellulitis of left lower extremity   • Urinary retention   • Acute bacterial endocarditis   • Staphylococcus aureus bacteremia   • Infection due to Acinetobacter species   • Endocarditis   • Left foot infection   • Uncontrolled hypertension   • Occlusion and stenosis of bilateral carotid arteries   • S/P BKA (below knee amputation) unilateral, left ([facility])   • Phantom pain after amputation of lower extremity ([facility])   • S/P CABG (coronary artery bypass graft)   • Acute colitis   • Severe malnutrition ([facility])   • Sepsis ([facility])   • TIA (transient ischemic attack)   • Chest pain, unspecified type   • Coronary artery abnormality     Past Medical History:   Diagnosis Date   • Angina, class IV ([facility])    • Anxiety    • Anxiety and depression    • Arthritis    • Benign paroxysmal positional vertigo    • Bladder disorder     has bladder stimulator   • Carpal tunnel syndrome    • CHF (congestive heart failure) ([facility])    • Chronic pain    • Coronary atherosclerosis     hx CABG 2005.  is followed by Dr Kwon   • Depression    • Diabetes mellitus ([facility])     Type 2, controlled   • Diabetic polyneuropathy ([facility])    • Disease of thyroid gland    • Elevated cholesterol    • Female stress incontinence    • Foot pain, left    • Full dentures    • Gastroparesis    • GERD (gastroesophageal reflux disease)    • Hyperlipidemia    • Hypertension    • Low back pain    • Malaise and fatigue    • Multiple joint pain    • Obesity     Refuses to be weighed   • Occlusion and stenosis of bilateral carotid arteries 6/18/2021   • Otalgia     Both   • Perforation of tympanic  membrane     Left   • Postoperative wound infection    • Vitamin D deficiency    • Wears glasses     reading     Past Surgical History:   Procedure Laterality Date   • ABDOMINAL SURGERY     • AMPUTATION FOOT     • ANGIOPLASTY      coronary   • ANKLE ARTHROSCOPY Left 2/26/2021    Procedure: Left foot hardware removal, ankle arthroscopy, bone grafting , left foot exostectomy;  Surgeon: Ignacio Lord DPM;  Location: [facility] OR;  Service: Podiatry;  Laterality: Left;   • BREAST BIOPSY Right    • CARDIAC CATHETERIZATION     • CARDIAC CATHETERIZATION N/A 6/20/2017    Procedure: Right Heart Cath;  Surgeon: Can Kwon MD PhD;  Location: [facility] CATH INVASIVE LOCATION;  Service:    • CARDIAC CATHETERIZATION N/A 2/18/2020    Procedure: Left Heart Cath;  Surgeon: Catalina Cooper MD;  Location: [facility] CATH INVASIVE LOCATION;  Service: Cardiology;  Laterality: N/A;   • CARPAL TUNNEL RELEASE     • CHOLECYSTECTOMY     • COLONOSCOPY N/A 6/24/2020    Procedure: COLONOSCOPY;  Surgeon: Julián Maldonado MD;  Location: [facility] ENDOSCOPY;  Service: Gastroenterology;  Laterality: N/A;   • CORONARY ARTERY BYPASS GRAFT  2005   • ENDOSCOPY N/A 10/19/2018    Procedure: ESOPHAGOGASTRODUODENOSCOPY possible dilation;  Surgeon: Julián Maldonado MD;  Location: [facility] ENDOSCOPY;  Service: Gastroenterology   • ENDOSCOPY N/A 6/24/2020    Procedure: ESOPHAGOGASTRODUODENOSCOPY WED appt please;  Surgeon: Julián Maldonado MD;  Location: [facility] ENDOSCOPY;  Service: Gastroenterology;  Laterality: N/A;   • ENDOSCOPY AND COLONOSCOPY     • FOOT SURGERY      Toes   • FOOT SURGERY     • GASTRIC BANDING      Revision, laparoscopic   • HYSTERECTOMY     • INCISION AND DRAINAGE LEG Left 3/12/2021    Procedure: Left ankle arthroscopic irrigation and debridement, screw removal;  Surgeon: Ignacio Lord DPM;  Location: [facility] OR;  Service: Podiatry;  Laterality: Left;   • MOUTH SURGERY     • SALPINGO OOPHORECTOMY     • SHOULDER SURGERY     •  SUBTALAR ARTHRODESIS Left 1/16/2019    Procedure: LEFT FOOT HARDWARE REMOVAL, FIFTH METATARSAL , OPEN REDUCTION INTERNAL FIXATION, CALCANEAL OSTEOTOMY;  Surgeon: Ignacio Lord DPM;  Location: [facility];  Service: Podiatry   • SUBTALAR ARTHRODESIS Left 10/16/2019    Procedure: foot hardware removal, subtalar joint fusion  possible de/reattachment of achilles tendon        (c-arm);  Surgeon: Ignacio Lord DPM;  Location: [facility];  Service: Podiatry   • SUBTALAR ARTHRODESIS Left 9/30/2020    Procedure: subtalar, talonavicular joint arthrodesis.  Removal hardware.          (c-arm);  Surgeon: Ignacio Lord DPM;  Location: [facility];  Service: Podiatry;  Laterality: Left;   • TRANSESOPHAGEAL ECHOCARDIOGRAM (LAMONTE)      With color flow      General Information     Row Name 04/05/22 0304          Physical Therapy Time and Intention    Document Type evaluation  -     Mode of Treatment co-treatment;physical therapy;occupational therapy  -     Row Name 04/05/22 6635          General Information    Patient Profile Reviewed yes  -GB     Prior Level of Function independent:;transfer  -     Existing Precautions/Restrictions fall;cardiac  -     Row Name 04/05/22 6008          Living Environment    People in Home spouse  -     Name(s) of People in Home uses prosthesis daily in/out of house using RW; has but not using bsc ; uses regular toilet w/ arabella hand rails; walk in shower w/ chair; does not walk distances w/o prosthesis; indep toiletting; he assists in/out of shower and supvervises safety in absence of grab bars; plans to start driving; indep dressing;  -     Row Name 04/05/22 1890          Home Main Entrance    Number of Stairs, Main Entrance none  -     Row Name 04/05/22 1350          Stairs Within Home, Primary    Stairs, Within Home, Primary uses ramp to enter home; no steps inside; no recent fall but had one just after amputation; She does not walk w/out prosthesis any distance but can  transfer w/ FWRW w/out prosthesis  -GB     Number of Stairs, Within Home, Primary none  -GB     Row Name 04/05/22 1350          Cognition    Orientation Status (Cognition) oriented x 4  -GB     Row Name 04/05/22 1350          Safety Issues, Functional Mobility    Impairments Affecting Function (Mobility) endurance/activity tolerance;pain  -GB     Comment, Safety Issues/Impairments (Mobility) CP of unknown origin at time of eval  -GB           User Key  (r) = Recorded By, (t) = Taken By, (c) = Cosigned By    Initials Name Provider Type    Betty Goldsmith, PT Physical Therapist               Mobility     Row Name 04/05/22 1350          Bed Mobility    Bed Mobility sit-supine;supine-sit  -GB     Supine-Sit [place] (Bed Mobility) modified independence  -GB     Sit-Supine [place] (Bed Mobility) modified independence  -GB     Assistive Device (Bed Mobility) bed rails;head of bed elevated  -     Row Name 04/05/22 1350          Transfers    Comment, (Transfers) bed<>bsc transfer modified indep w/ bed rail and bsc rail  -     Row Name 04/05/22 1350          Bed-Chair Transfer    Bed-Chair [place] (Transfers) modified independence  -GB     Assistive Device (Bed-Chair Transfers) other (see comments)  -GB     Comment, (Bed-Chair Transfer) transferred w/ bed and bsc rails; wt bearing on L knee, not stump for transfer  -GB     Row Name 04/05/22 1350          Sit-Stand Transfer    Sit-Stand [place] (Transfers) standby assist;modified independence  -GB     Row Name 04/05/22 1350          Gait/Stairs (Locomotion)    [place] Level (Gait) not tested  -     Distance in Feet (Gait) does not walk distance w/out prosthesis in order to protect RLE and foot from hopping;  -     Row Name 04/05/22 1350          Mobility    Extremity Weight-bearing Status other (see comments)  L BKA  -GB           User Key  (r) = Recorded By, (t) = Taken By, (c) = Cosigned By    Initials Name Provider Type    GB  Betty Marrero, PT Physical Therapist               Obj/Interventions     Row Name 04/05/22 1350          Strength Comprehensive (MMT)    Comment, General Manual Muscle Testing (MMT) Assessment 4/5 hip knee ankle flx/ext RLE; hip knee flx/ext L side;  -GB     Row Name 04/05/22 1350          Balance    Balance Assessment sitting static balance;sitting dynamic balance;sit to stand dynamic balance  -GB     Static Sitting Balance independent  -GB     Dynamic Sitting Balance independent  -GB     Position, Sitting Balance unsupported;sitting edge of bed  -GB     Sit to Stand Dynamic Balance modified independence  -GB     Row Name 04/05/22 1350          Sensory Assessment (Somatosensory)    Sensory Assessment (Somatosensory) LE sensation intact  -GB           User Key  (r) = Recorded By, (t) = Taken By, (c) = Cosigned By    Initials Name Provider Type     Betty Marrero, PT Physical Therapist               Goals/Plan     Row Name 04/05/22 0642          Bed Mobility Goal 1 (PT)    Activity/Assistive Device (Bed Mobility Goal 1, PT) bed mobility activities, all  -GB     [place] Level/Cues Needed (Bed Mobility Goal 1, PT) modified independence;independent  -GB     Progress/Outcomes (Bed Mobility Goal 1, PT) goal ongoing  -     Row Name 04/05/22 1732          Transfer Goal 1 (PT)    Activity/Assistive Device (Transfer Goal 1, PT) toilet;bed-to-chair/chair-to-bed  -GB     [place] Level/Cues Needed (Transfer Goal 1, PT) modified independence  -GB     Progress/Outcome (Transfer Goal 1, PT) goal ongoing  -     Row Name 04/05/22 1732          Gait Training Goal 1 (PT)    Activity/Assistive Device (Gait Training Goal 1, PT) gait (walking locomotion);assistive device use  -GB     [place] Level (Gait Training Goal 1, PT) modified independence  -GB     Distance (Gait Training Goal 1, PT) with prosthesis, walk 100 ft or more w/ RW w/out LOB and w/ VSS  -GB     Progress/Outcome (Gait Training  Goal 1, PT) goal not met  -GB     Row Name 04/05/22 1916          Problem Specific Goal 1 (PT)    Problem Specific Goal 1 (PT) pt will becki transfers and gait w/ sats 94% or above and/or recovery time of less than 1 min post activity.  -GB     Progress/Outcome (Problem Specific Goal 1, PT) goal not met  -GB           User Key  (r) = Recorded By, (t) = Taken By, (c) = Cosigned By    Initials Name Provider Type    Betty Goldsmith, PT Physical Therapist               Clinical Impression     Row Name 04/05/22 1353          Pain    Pretreatment Pain Rating 3/10  as long as she is still  -GB     Posttreatment Pain Rating 3/10  -GB     Pain Location generalized  -GB     Pain Location - chest  -GB     Pre/Posttreatment Pain Comment has generalized pain w/ chest pain worse; no specific cause noted;  Cold/ice applied to R chest where she states pain; stated ice felt it was helping/numbing it but did not bring pain level down; stated upon questioning that UEs used more recently since becoming an amputee and they are sore  -GB     Pain Intervention(s) Repositioned;Cold applied  -GB     Row Name 04/05/22 3850          Plan of Care Review    Outcome Evaluation PT completed w/ OT eval as well. Pt has been BKA since Memorial Day 2021 and has prosthesis since ~ Nov 2021.  She did rehab in [facility] and now outpt PT at [facility] here in [facility]. She stated she uses UEs more since amputation, particularly in melanie forward position to hold walker or push up to allow transfers. She says she is sore all over but primarily in R chest. She is R dominant and thinks her chest pain can be musculoskeletal when asked. She was able to move sup<>sit, sit<>stand and transfer w/ only mild increase of chest pain but w/ VSS except 02 sats. Sats stayed more in the 88-90% range on RA; she denied SOA and denied feeling of heart related chest pain stating when asked that it felt more musculo skeletal.  We discussed how her UE use has increased  dramatically since ampuation and that her use of UEs for wt bearing are primarily in front of her body.  She agreed to try ice on R side of chest and this was becki w/ VSS. She sees outpt PT to she is recommended to follow up w/ her PT there and see if UE /chest related w/ use of UEs in her functional tasks.  She verbalized understanding and a mild reduction of chest pain w/ ice, tho still at 3/10 level. RN notified re: progress and the issue w/ lower 02 sats thru the evaluation session.  Rec home w/  outpt PT when clearance given by providers.  Follow up on 02 sats also recommended.  -GB     Row Name 04/05/22 1356          Therapy Assessment/Plan (PT)    Rehab Potential (PT) good, to achieve stated therapy goals  -GB     Criteria for Skilled Interventions Met (PT) yes  -GB     Predicted Duration of Therapy Intervention (PT) till goals met  -GB     Row Name 04/05/22 1353          Vital Signs    Pre Systolic BP Rehab 116  -GB     Pre Treatment Diastolic BP 57  -GB     Intra Systolic BP Rehab 114  -GB     Intra Treatment Diastolic BP 56  -GB     Post Systolic BP Rehab 118  -GB     Post Treatment Diastolic BP 56  -GB     Pretreatment Heart Rate (beats/min) 73  -GB     Intratreatment Heart Rate (beats/min) 81  -GB     Posttreatment Heart Rate (beats/min) 81  -GB     Pre SpO2 (%) 91  -GB     O2 Delivery Pre Treatment room air  -GB     Intra SpO2 (%) 92  -GB     O2 Delivery Intra Treatment room air  -GB     Post SpO2 (%) 91  -GB     O2 Delivery Post Treatment room air  -GB     Pre Patient Position Supine  -GB     Intra Patient Position Standing  -GB     Post Patient Position Sitting  -GB     Recovery Time at rest and thru most of session pt's sats hovered between 88-91% tho she denied SOA w/ activity and w/ rest  -GB     Row Name 04/05/22 7269          Positioning and Restraints    Pre-Treatment Position in bed  -GB     Post Treatment Position bed  -GB           User Key  (r) = Recorded By, (t) = Taken By, (c) = Cosigned By     Initials Name Provider Type    Betty Goldsmith, PT Physical Therapist               Outcome Measures     Row Name 04/05/22 1735          How much help from another person do you currently need...    Turning from your back to your side while in flat bed without using bedrails? 3  -GB     Moving from lying on back to sitting on the side of a flat bed without bedrails? 3  -GB     Moving to and from a bed to a chair (including a wheelchair)? 3  -GB     Standing up from a chair using your arms (e.g., wheelchair, bedside chair)? 3  -GB     Climbing 3-5 steps with a railing? 1  -GB     To walk in hospital room? 2  -GB     AM-PAC 6 Clicks Score (PT) 15  -GB     Row Name 04/05/22 1735 04/05/22 1501       Functional Assessment    Outcome Measure Options AM-PAC 6 Clicks Basic Mobility (PT)  -GB AM-PAC 6 Clicks Daily Activity (OT)  -          User Key  (r) = Recorded By, (t) = Taken By, (c) = Cosigned By    Initials Name Provider Type    Betty Goldsmith, PT Physical Therapist    SJ Francisco Randall, OT Occupational Therapist                             Physical Therapy Education                 Title: PT OT SLP Therapies (In Progress)     Topic: Physical Therapy (Done)     Point: Mobility training (Done)     Learning Progress Summary           Patient Acceptance, E,D, JAILENE,VU by MUSTAPHA at 4/5/2022 1736    Comment: POC; counselled on 02 sats being low and may need to be watched w/ actviity; ice to chest for possible muscle pain post activity                   Point: Home exercise program (Done)     Learning Progress Summary           Patient Acceptance, E,D, JAILENE,VU by MUSTAPHA at 4/5/2022 1736    Comment: POC; counselled on 02 sats being low and may need to be watched w/ actviity; ice to chest for possible muscle pain post activity                   Point: Body mechanics (Done)     Learning Progress Summary           Patient Acceptance, E,D, JAILENE,VU by MUSTAPHA at 4/5/2022 1736    Comment: POC; counselled on 02 sats  being low and may need to be watched w/ actviity; ice to chest for possible muscle pain post activity                   Point: Precautions (Done)     Learning Progress Summary           Patient Acceptance, E,D, DU,VU by MUSTAPHA at 4/5/2022 8528    Comment: POC; counselled on 02 sats being low and may need to be watched w/ actviity; ice to chest for possible muscle pain post activity                               User Key     Initials Effective Dates Name Provider Type Discipline    MUSTAPHA 06/16/21 -  Betty Marrero, PT Physical Therapist PT              PT Recommendation and Plan  Planned Therapy Interventions (PT): balance training, bed mobility training, gait training, home exercise program, patient/family education, transfer training, neuromuscular re-education, strengthening, ROM (range of motion), prosthetic fitting/training  Outcome Evaluation: PT completed w/ OT eval as well. Pt has been BKA since Memorial Day 2021 and has prosthesis since ~ Nov 2021.  She did rehab in [facility] and now outpt PT at [facility] here in [facility]. She stated she uses UEs more since amputation, particularly in melanie forward position to hold walker or push up to allow transfers. She says she is sore all over but primarily in R chest. She is R dominant and thinks her chest pain can be musculoskeletal when asked. She was able to move sup<>sit, sit<>stand and transfer w/ only mild increase of chest pain but w/ VSS except 02 sats. Sats stayed more in the 88-90% range on RA; she denied SOA and denied feeling of heart related chest pain stating when asked that it felt more musculo skeletal.  We discussed how her UE use has increased dramatically since ampuation and that her use of UEs for wt bearing are primarily in front of her body.  She agreed to try ice on R side of chest and this was becki w/ VSS. She sees outpt PT to she is recommended to follow up w/ her PT there and see if UE /chest related w/ use of UEs in her functional tasks.  She  verbalized understanding and a mild reduction of chest pain w/ ice, tho still at 3/10 level. RN notified re: progress and the issue w/ lower 02 sats thru the evaluation session.  Rec home w/  outpt PT when clearance given by providers.  Follow up on 02 sats also recommended.     Time Calculation:    PT Charges     Row Name 04/05/22 1350             Time Calculation    Start Time 1350  -GB      Stop Time 1440  -GB      Time Calculation (min) 50 min  -GB      PT Received On 04/05/22  -GB      PT Goal Re-Cert Due Date 04/14/22  -GB              Untimed Charges    PT Eval/Re-eval Minutes 50  -GB              Total Minutes    Untimed Charges Total Minutes 50  -GB       Total Minutes 50  -GB            User Key  (r) = Recorded By, (t) = Taken By, (c) = Cosigned By    Initials Name Provider Type    Betty Goldsmith, PT Physical Therapist              Therapy Charges for Today     Code Description Service Date Service Provider Modifiers Qty    26894321089 HC PT EVAL MOD COMPLEXITY 3 4/5/2022 Betty Marrero, PT GP 1    83938997664 HC PT THER SUPP EA 15 MIN 4/5/2022 Betty Marrero, PT GP 1          PT G-Codes  Outcome Measure Options: AM-PAC 6 Clicks Basic Mobility (PT)  AM-PAC 6 Clicks Score (PT): 15  AM-PAC 6 Clicks Score (OT): 24    Betty Marrero PT  4/5/2022

## 2022-04-05 NOTE — THERAPY EVALUATION
Patient Name: Ariana Martinez  : 1960    MRN: [id number]                              Today's Date: 2022       Admit Date: 2022    Visit Dx:     ICD-10-CM ICD-9-CM   1. Chest pain, unspecified type  R07.9 786.50   2. Weakness  R53.1 780.79   3. Hypotension, unspecified hypotension type  I95.9 458.9   4. CHON (acute kidney injury) ([facility])  N17.9 584.9   5. GERD without esophagitis  K21.9 530.81   6. Coronary artery abnormality  Q24.5 746.85   7. Impaired mobility and ADLs  Z74.09 V49.89    Z78.9      Patient Active Problem List   Diagnosis   • 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, with long-term current use of insulin   • Closed nondisplaced fracture of fifth left metatarsal bone   • MAURICIO (generalized anxiety disorder)   • Depression, major, recurrent, moderate ([facility])   • GERD without esophagitis   • Long term prescription opiate use   • Mixed hyperlipidemia   • Vitamin D deficiency   • Seasonal allergic rhinitis   • Restrictive lung disease secondary to obesity   • Adult body mass index 37.0-37.9   • Snoring   • Class 3 severe obesity due to excess calories without serious comorbidity with body mass index (BMI) of 40.0 to 44.9 in adult ([facility])   • (HFpEF) heart failure with preserved ejection fraction ([facility])   • Type 2 diabetes mellitus with hyperglycemia, with long-term current use of insulin ([facility])   • Cyanocobalamin deficiency   • Coronary artery disease of native artery of native heart with stable angina pectoris ([facility])   • Hypertension   • Meniere's disease   • Gastroparesis   • Otitis media with effusion, left   • Pulmonary hypertension ([facility])   • Displaced fracture of fifth metatarsal bone, left foot, subsequent encounter for fracture with nonunion   • Pes cavus   • Primary osteoarthritis involving multiple joints   • Generalized anxiety disorder   • Chronic right-sided low back pain with right-sided sciatica   • Chest pain   • Thrombocytosis   • Leukocytosis   • Iron  deficiency   • Adverse effect of iron   • Hindfoot varus, acquired, left   • Chest pain due to myocardial ischemia   • Nonrheumatic tricuspid valve regurgitation   • Iron deficiency anemia due to chronic blood loss   • Malaise and fatigue   • Nonunion of subtalar arthrodesis   • Ankle arthritis   • Cellulitis of left lower extremity   • Urinary retention   • Acute bacterial endocarditis   • Staphylococcus aureus bacteremia   • Infection due to Acinetobacter species   • Endocarditis   • Left foot infection   • Uncontrolled hypertension   • Occlusion and stenosis of bilateral carotid arteries   • S/P BKA (below knee amputation) unilateral, left ([facility])   • Phantom pain after amputation of lower extremity ([facility])   • S/P CABG (coronary artery bypass graft)   • Acute colitis   • Severe malnutrition ([facility])   • Sepsis ([facility])   • TIA (transient ischemic attack)   • Chest pain, unspecified type   • Coronary artery abnormality     Past Medical History:   Diagnosis Date   • Angina, class IV ([facility])    • Anxiety    • Anxiety and depression    • Arthritis    • Benign paroxysmal positional vertigo    • Bladder disorder     has bladder stimulator   • Carpal tunnel syndrome    • CHF (congestive heart failure) ([facility])    • Chronic pain    • Coronary atherosclerosis     hx CABG 2005.  is followed by Dr Kwon   • Depression    • Diabetes mellitus ([facility])     Type 2, controlled   • Diabetic polyneuropathy ([facility])    • Disease of thyroid gland    • Elevated cholesterol    • Female stress incontinence    • Foot pain, left    • Full dentures    • Gastroparesis    • GERD (gastroesophageal reflux disease)    • Hyperlipidemia    • Hypertension    • Low back pain    • Malaise and fatigue    • Multiple joint pain    • Obesity     Refuses to be weighed   • Occlusion and stenosis of bilateral carotid arteries 6/18/2021   • Otalgia     Both   • Perforation of tympanic membrane     Left   • Postoperative wound infection    • Vitamin D deficiency    • Wears  glasses     reading     Past Surgical History:   Procedure Laterality Date   • ABDOMINAL SURGERY     • AMPUTATION FOOT     • ANGIOPLASTY      coronary   • ANKLE ARTHROSCOPY Left 2/26/2021    Procedure: Left foot hardware removal, ankle arthroscopy, bone grafting , left foot exostectomy;  Surgeon: Ignacio Lord DPM;  Location: [facility] OR;  Service: Podiatry;  Laterality: Left;   • BREAST BIOPSY Right    • CARDIAC CATHETERIZATION     • CARDIAC CATHETERIZATION N/A 6/20/2017    Procedure: Right Heart Cath;  Surgeon: Can Kwon MD PhD;  Location: [facility] CATH INVASIVE LOCATION;  Service:    • CARDIAC CATHETERIZATION N/A 2/18/2020    Procedure: Left Heart Cath;  Surgeon: Catalina Cooper MD;  Location: [facility] CATH INVASIVE LOCATION;  Service: Cardiology;  Laterality: N/A;   • CARPAL TUNNEL RELEASE     • CHOLECYSTECTOMY     • COLONOSCOPY N/A 6/24/2020    Procedure: COLONOSCOPY;  Surgeon: Julián Maldonado MD;  Location: [facility] ENDOSCOPY;  Service: Gastroenterology;  Laterality: N/A;   • CORONARY ARTERY BYPASS GRAFT  2005   • ENDOSCOPY N/A 10/19/2018    Procedure: ESOPHAGOGASTRODUODENOSCOPY possible dilation;  Surgeon: Julián Maldonado MD;  Location: [facility] ENDOSCOPY;  Service: Gastroenterology   • ENDOSCOPY N/A 6/24/2020    Procedure: ESOPHAGOGASTRODUODENOSCOPY WED appt please;  Surgeon: Julián Maldonado MD;  Location: [facility] ENDOSCOPY;  Service: Gastroenterology;  Laterality: N/A;   • ENDOSCOPY AND COLONOSCOPY     • FOOT SURGERY      Toes   • FOOT SURGERY     • GASTRIC BANDING      Revision, laparoscopic   • HYSTERECTOMY     • INCISION AND DRAINAGE LEG Left 3/12/2021    Procedure: Left ankle arthroscopic irrigation and debridement, screw removal;  Surgeon: Ignacio Lord DPM;  Location: [facility] OR;  Service: Podiatry;  Laterality: Left;   • MOUTH SURGERY     • SALPINGO OOPHORECTOMY     • SHOULDER SURGERY     • SUBTALAR ARTHRODESIS Left 1/16/2019    Procedure: LEFT FOOT HARDWARE REMOVAL, FIFTH  METATARSAL , OPEN REDUCTION INTERNAL FIXATION, CALCANEAL OSTEOTOMY;  Surgeon: Ignacio Lord DPM;  Location: [facility];  Service: Podiatry   • SUBTALAR ARTHRODESIS Left 10/16/2019    Procedure: foot hardware removal, subtalar joint fusion  possible de/reattachment of achilles tendon        (c-arm);  Surgeon: Ignacio Lord DPM;  Location: [facility];  Service: Podiatry   • SUBTALAR ARTHRODESIS Left 9/30/2020    Procedure: subtalar, talonavicular joint arthrodesis.  Removal hardware.          (c-arm);  Surgeon: Ignacio Lord DPM;  Location: [facility];  Service: Podiatry;  Laterality: Left;   • TRANSESOPHAGEAL ECHOCARDIOGRAM ([facility])      With color flow      General Information     Row Name 04/05/22 1351          OT Time and Intention    Document Type evaluation  -     Mode of Treatment co-treatment;physical therapy;occupational therapy  -     Row Name 04/05/22 1357          General Information    Patient Profile Reviewed yes  -     Prior Level of Function independent:;gait;all household mobility;transfer;community mobility;bed mobility;ADL's;feeding;grooming;dressing;bathing;home management  Patient progressing towards cooking, cleaning, and driving  -     Existing Precautions/Restrictions fall;cardiac  -     Row Name 04/05/22 1351          Living Environment    People in Home spouse  -     Row Name 04/05/22 1351          Stairs Within Home, Primary    Stairs, Within Home, Primary uses ramp to enter home; no steps inside; no recent fall but had one just after amputation. uses prosthesis daily in/out of house using RW; has but not using bsc ; uses regular toilet w/ arabella hand rails; walk in shower w/ chair; indep in dressing, bathing, toileting;  assists for shower shower transferws and supvervises safety in absence of grab bars; plans to start driving. Very competent in donning/doffing prosthetic (patient reports)-- prosthetic not in hospital at this time. Can walk in grocery store with  prosthetic and RW. Can transfer to w/c or commode without prosthetic (stand pivot). Unable to transfer long distance with RW and no prosthetic. Patient is currently in outpatient PT.  -     Number of Stairs, Within Home, Primary none  -     Row Name 04/05/22 1351          Cognition    Orientation Status (Cognition) oriented x 4  -[facility] Name 04/05/22 1351          Safety Issues, Functional Mobility    Impairments Affecting Function (Mobility) endurance/activity tolerance;pain  -           User Key  (r) = Recorded By, (t) = Taken By, (c) = Cosigned By    Initials Name Provider Type     Francisco Randall, OT Occupational Therapist                 Mobility/ADL's     Row Name 04/05/22 1425          Bed Mobility    Bed Mobility sit-supine;supine-sit  -     Supine-Sit [place] (Bed Mobility) modified independence  -     Sit-Supine [place] (Bed Mobility) modified independence  -     Bed Mobility, Safety Issues cognitive deficits limit understanding;decreased use of legs for bridging/pushing  -     Assistive Device (Bed Mobility) bed rails;head of bed elevated  -[facility] Name 04/05/22 1425          Transfers    Transfers sit-stand transfer;stand pivot/stand step transfer  -     Comment, (Transfers) squat pivot  -     Sit-Stand [place] (Transfers) standby assist;contact guard  -[facility] Name 04/05/22 1425          Activities of Daily Living    BADL Assessment/Intervention lower body dressing  -[facility] Name 04/05/22 1425          Mobility    Extremity Weight-bearing Status other (see comments)   L BKA  -[facility] Name 04/05/22 1425          Stand Pivot/Stand Step Transfer    Stand Pivot/Stand Step [place] (Transfers) contact guard;standby assist  -[facility] Name 04/05/22 1425          Lower Body Dressing Assessment/Training    [place] Level (Lower Body Dressing) doff;don;socks;pants/bottoms;modified independence  -     Position (Lower Body Dressing) edge of bed sitting   -           User Key  (r) = Recorded By, (t) = Taken By, (c) = Cosigned By    Initials Name Provider Type    Francisco Camarena OT Occupational Therapist               Obj/Interventions     [facility] Name 04/05/22 135          Sensory Assessment (Somatosensory)    Sensory Assessment (Somatosensory) UE sensation intact  -[facility] Name 04/05/22 1351          Range of Motion Comprehensive    General Range of Motion bilateral upper extremity ROM WNL  -SJ     Row Name 04/05/22 1351          Strength Comprehensive (MMT)    General Manual Muscle Testing (MMT) Assessment other (see comments)  -     Comment, General Manual Muscle Testing (MMT) Assessment 4/5 grossly  -           User Key  (r) = Recorded By, (t) = Taken By, (c) = Cosigned By    Initials Name Provider Type    Francisco Camarena OT Occupational Therapist               Goals/Plan    No documentation.                Clinical Impression     Row Name 04/05/22 1356          Pain Assessment    Pretreatment Pain Rating 3/10  -SJ     Posttreatment Pain Rating 3/10  -SJ     Pain Location generalized  -     Pain Location - chest  -     Pain Intervention(s) Repositioned  -SJ     Row Name 04/05/22 [facility]6          Plan of Care Review    Plan of Care Reviewed With patient  -     Outcome Evaluation OT eval complete, supine in bed upon arrival. Supine<>sit with modified independence. LE dressing with modified independence, socks and underwear. Bed to [facility] transfer stand pivot, with SBA/CGA. Returned to bed at end of session. Patient independent in all ADLs and safe with transfers. Patient with good safety awareness, not a risk for falls. Recommend home with assist. No skilled OT intervention needed at this time.  -[facility] Name 04/05/22 2835          Therapy Assessment/Plan (OT)    Patient/Family Therapy Goal Statement (OT) return home  -     Rehab Potential (OT) good, to achieve stated therapy goals  -     Criteria for Skilled Therapeutic Interventions Met  (OT) no  -SJ     Therapy Frequency (OT) evaluation only  -     Row Name 04/05/22 1354          Therapy Plan Review/Discharge Plan (OT)    Anticipated Discharge Disposition (OT) home with assist  -     Row Name 04/05/22 1354          Vital Signs    Pre Systolic BP Rehab 116  -SJ     Pre Treatment Diastolic BP 57  -SJ     Intra Systolic BP Rehab 114  -SJ     Intra Treatment Diastolic BP 56  -SJ     Post Systolic BP Rehab 118  -SJ     Post Treatment Diastolic BP 56  -SJ     Pretreatment Heart Rate (beats/min) 73  -SJ     Intratreatment Heart Rate (beats/min) 81  -SJ     Posttreatment Heart Rate (beats/min) 81  -SJ     Pre SpO2 (%) 91  -SJ     O2 Delivery Pre Treatment room air  -SJ     Intra SpO2 (%) 92  -SJ     O2 Delivery Intra Treatment room air  -SJ     Post SpO2 (%) 91  -SJ     O2 Delivery Post Treatment room air  -SJ     Pre Patient Position Supine  -SJ     Post Patient Position Supine  -SJ     Row Name 04/05/22 1354          Positioning and Restraints    Pre-Treatment Position in bed  -SJ     Post Treatment Position bed  -SJ     In Bed supine;call light within reach;encouraged to call for assist  -           User Key  (r) = Recorded By, (t) = Taken By, (c) = Cosigned By    Initials Name Provider Type     Frnacisco Randall, OT Occupational Therapist               Outcome Measures     Row Name 04/05/22 1501          How much help from another is currently needed...    Putting on and taking off regular lower body clothing? 4  -SJ     Bathing (including washing, rinsing, and drying) 4  -SJ     Toileting (which includes using toilet bed pan or urinal) 4  -SJ     Putting on and taking off regular upper body clothing 4  -SJ     Taking care of personal grooming (such as brushing teeth) 4  -SJ     Eating meals 4  -SJ     AM-PAC 6 Clicks Score (OT) 24  -SJ     Row Name 04/05/22 1501          Functional Assessment    Outcome Measure Options AM-PAC 6 Clicks Daily Activity (OT)  -SJ           User Key  (r) = Recorded  By, (t) = Taken By, (c) = Cosigned By    Initials Name Provider Type     Francisco Randall OT Occupational Therapist                Occupational Therapy Education                 Title: PT OT SLP Therapies (In Progress)     Topic: Occupational Therapy (In Progress)     Point: ADL training (Not Started)     Description:   Instruct learner(s) on proper safety adaptation and remediation techniques during self care or transfers.   Instruct in proper use of assistive devices.              Learner Progress:  Not documented in this visit.          Point: Home exercise program (Not Started)     Description:   Instruct learner(s) on appropriate technique for monitoring, assisting and/or progressing therapeutic exercises/activities.              Learner Progress:  Not documented in this visit.          Point: Precautions (Done)     Description:   Instruct learner(s) on prescribed precautions during self-care and functional transfers.              Learning Progress Summary           Patient Acceptance, E,TB, VU by  at 4/5/2022 1502    Comment: POC, role of OT, transfer training                   Point: Body mechanics (Done)     Description:   Instruct learner(s) on proper positioning and spine alignment during self-care, functional mobility activities and/or exercises.              Learning Progress Summary           Patient Acceptance, E,TB, VU by  at 4/5/2022 1502    Comment: POC, role of OT, transfer training                               User Key     Initials Effective Dates Name Provider Type Discipline     06/14/21 -  Francisco Randall OT Occupational Therapist OT              OT Recommendation and Plan  Therapy Frequency (OT): evaluation only  Plan of Care Review  Plan of Care Reviewed With: patient  Outcome Evaluation: OT eval complete, supine in bed upon arrival. Supine<>sit with modified independence. LE dressing with modified independence, socks and underwear. Bed to [facility] transfer stand pivot, with SBA/CGA.  Returned to bed at end of session. Patient independent in all ADLs and safe with transfers. Patient with good safety awareness, not a risk for falls. Recommend home with assist. No skilled OT intervention needed at this time.     Time Calculation:    Time Calculation- OT     Row Name 04/05/22 1452             Time Calculation- OT    OT Start Time 1351  -      OT Stop Time 1430  -      OT Time Calculation (min) 39 min  -SJ      OT Received On 04/05/22  -      OT Goal Re-Cert Due Date 04/18/22  -              Untimed Charges    OT Eval/Re-eval Minutes 39  -SJ              Total Minutes    Untimed Charges Total Minutes 39  -SJ       Total Minutes 39  -SJ            User Key  (r) = Recorded By, (t) = Taken By, (c) = Cosigned By    Initials Name Provider Type     Francisco Randall OT Occupational Therapist              Therapy Charges for Today     Code Description Service Date Service Provider Modifiers Qty    [id number] HC OT EVAL LOW COMPLEXITY 3 4/5/2022 Francisco Randall OT GO 1               Francisco Randall OT  4/5/2022

## 2022-04-05 NOTE — PLAN OF CARE
Goal Outcome Evaluation:  Plan of Care Reviewed With: patient           Outcome Evaluation: OT eval complete, supine in bed upon arrival. Supine<>sit with modified independence. LE dressing with modified independence, socks and underwear. Bed to C transfer stand pivot, with SBA/CGA. Returned to bed at end of session. Patient independent in all ADLs and safe with transfers. Patient with good safety awareness, not a risk for falls. Recommend home with assist. No skilled OT intervention needed at this time.

## 2022-04-05 NOTE — PROGRESS NOTES
TWO PATIENT IDENTIFIERS WERE USED. THE PATIENT WAS DRAPED WITH A FULL BODY DRAPE AND THE PATIENT'S RIGHT ARM WAS PREPPED WITH CHLORA PREP. ULTRASOUND WAS USED TO LOCALIZE THERIGHT BASILIC VEIN. SUBCUTANEOUS TISSUE AT THE CATHETER SITE WAS INFILTRATED WITH 2% LIDOCAINE. UNDER ULTRASOUND GUIDANCE, THE VEIN WAS ACCESSED WITH A 21 GAUGE  NEEDLE. AN 0.018 WIRE WAS THEN THREADED THROUGH THE NEEDLE. THE 21 GAUGE NEEDLE WAS REMOVED AND A 4 Italian SHEATH WAS PLACED OVER THE WIRE INTO THE VEIN.THE MIDLINE CATHETER WAS TRIMMED TO 20CM. THE MIDLINE CATHETER WAS THEN PLACED OVER THE WIRE INTO THE VEIN, THE SHEATH WAS PEELED AWAY, WIRE WAS REMOVED. CATHETER WAS FLUSHED WITH NORMAL SALINE AND CATHETER TIP APPLIED. BIOPATCH PLACED. CATHETER SECURED WITH STAT LOCK AND TEGADERM. PATIENT TOLERATED PROCEDURE WELL. THIS WAS DONE IN THE ANGIOSUITE      IMPRESSION:SUCCESSFUL PLACEMENT OF DUAL LUMEN MIDLINE.           Connie Wren  4/5/2022  09:15 CDT

## 2022-04-05 NOTE — PLAN OF CARE
Goal Outcome Evaluation:              Outcome Evaluation: PT completed w/ OT eval as well. Pt has been BKA since Memorial Day 2021 and has prosthesis since ~ Nov 2021.  She did rehab in [facility] and now outpt PT at [facility] here in [facility]. She stated she uses UEs more since amputation, particularly in melanie forward position to hold walker or push up to allow transfers. She says she is sore all over but primarily in R chest. She is R dominant and thinks her chest pain can be musculoskeletal when asked. She was able to move sup<>sit, sit<>stand and transfer w/ only mild increase of chest pain but w/ VSS except 02 sats. Sats stayed more in the 88-90% range on RA; she denied SOA and denied feeling of heart related chest pain stating when asked that it felt more musculo skeletal.  We discussed how her UE use has increased dramatically since ampuation and that her use of UEs for wt bearing are primarily in front of her body.  She agreed to try ice on R side of chest and this was becki w/ VSS. She sees outpt PT to she is recommended to follow up w/ her PT there and see if UE /chest related w/ use of UEs in her functional tasks.  She verbalized understanding and a mild reduction of chest pain w/ ice, tho still at 3/10 level. RN notified re: progress and the issue w/ lower 02 sats thru the evaluation session.  Rec home w/  outpt PT when clearance given by providers.  Follow up on 02 sats also recommended.

## 2022-04-06 LAB
ANION GAP SERPL CALCULATED.3IONS-SCNC: 10 MMOL/L (ref 5–15)
BUN SERPL-MCNC: 26 MG/DL (ref 8–23)
BUN/CREAT SERPL: 16.3 (ref 7–25)
CALCIUM SPEC-SCNC: 8.8 MG/DL (ref 8.6–10.5)
CHLORIDE SERPL-SCNC: 99 MMOL/L (ref 98–107)
CO2 SERPL-SCNC: 24 MMOL/L (ref 22–29)
CREAT SERPL-MCNC: 1.6 MG/DL (ref 0.57–1)
EGFRCR SERPLBLD CKD-EPI 2021: 36.8 ML/MIN/1.73
GLUCOSE BLDC GLUCOMTR-MCNC: 271 MG/DL (ref 70–130)
GLUCOSE BLDC GLUCOMTR-MCNC: 276 MG/DL (ref 70–130)
GLUCOSE BLDC GLUCOMTR-MCNC: 346 MG/DL (ref 70–130)
GLUCOSE BLDC GLUCOMTR-MCNC: 361 MG/DL (ref 70–130)
GLUCOSE SERPL-MCNC: 285 MG/DL (ref 65–99)
POTASSIUM SERPL-SCNC: 4.7 MMOL/L (ref 3.5–5.2)
QT INTERVAL: 396 MS
QTC INTERVAL: 442 MS
SODIUM SERPL-SCNC: 133 MMOL/L (ref 136–145)
WHOLE BLOOD HOLD SPECIMEN: NORMAL

## 2022-04-06 PROCEDURE — 80048 BASIC METABOLIC PNL TOTAL CA: CPT | Performed by: NURSE PRACTITIONER

## 2022-04-06 PROCEDURE — 63710000001 INSULIN ASPART PER 5 UNITS: Performed by: STUDENT IN AN ORGANIZED HEALTH CARE EDUCATION/TRAINING PROGRAM

## 2022-04-06 PROCEDURE — 25010000002 FENTANYL CITRATE (PF) 50 MCG/ML SOLUTION: Performed by: INTERNAL MEDICINE

## 2022-04-06 PROCEDURE — 96361 HYDRATE IV INFUSION ADD-ON: CPT

## 2022-04-06 PROCEDURE — 94760 N-INVAS EAR/PLS OXIMETRY 1: CPT

## 2022-04-06 PROCEDURE — C1760 CLOSURE DEV, VASC: HCPCS | Performed by: INTERNAL MEDICINE

## 2022-04-06 PROCEDURE — 0 IOPAMIDOL PER 1 ML: Performed by: INTERNAL MEDICINE

## 2022-04-06 PROCEDURE — 93459 L HRT ART/GRFT ANGIO: CPT | Performed by: INTERNAL MEDICINE

## 2022-04-06 PROCEDURE — 99214 OFFICE O/P EST MOD 30 MIN: CPT | Performed by: INTERNAL MEDICINE

## 2022-04-06 PROCEDURE — C1769 GUIDE WIRE: HCPCS | Performed by: INTERNAL MEDICINE

## 2022-04-06 PROCEDURE — C1894 INTRO/SHEATH, NON-LASER: HCPCS | Performed by: INTERNAL MEDICINE

## 2022-04-06 PROCEDURE — 63710000001 INSULIN DETEMIR PER 5 UNITS: Performed by: INTERNAL MEDICINE

## 2022-04-06 PROCEDURE — 25010000002 MIDAZOLAM PER 1 MG: Performed by: INTERNAL MEDICINE

## 2022-04-06 PROCEDURE — G0378 HOSPITAL OBSERVATION PER HR: HCPCS

## 2022-04-06 PROCEDURE — 82962 GLUCOSE BLOOD TEST: CPT

## 2022-04-06 PROCEDURE — 63710000001 ONDANSETRON PER 8 MG: Performed by: STUDENT IN AN ORGANIZED HEALTH CARE EDUCATION/TRAINING PROGRAM

## 2022-04-06 PROCEDURE — 25010000002 ONDANSETRON PER 1 MG: Performed by: INTERNAL MEDICINE

## 2022-04-06 RX ORDER — MIDAZOLAM HYDROCHLORIDE 1 MG/ML
INJECTION INTRAMUSCULAR; INTRAVENOUS AS NEEDED
Status: DISCONTINUED | OUTPATIENT
Start: 2022-04-06 | End: 2022-04-06 | Stop reason: HOSPADM

## 2022-04-06 RX ORDER — ONDANSETRON 2 MG/ML
INJECTION INTRAMUSCULAR; INTRAVENOUS AS NEEDED
Status: DISCONTINUED | OUTPATIENT
Start: 2022-04-06 | End: 2022-04-06 | Stop reason: HOSPADM

## 2022-04-06 RX ORDER — LIDOCAINE HYDROCHLORIDE 20 MG/ML
INJECTION, SOLUTION INFILTRATION; PERINEURAL AS NEEDED
Status: DISCONTINUED | OUTPATIENT
Start: 2022-04-06 | End: 2022-04-06 | Stop reason: HOSPADM

## 2022-04-06 RX ORDER — SODIUM CHLORIDE 9 MG/ML
150 INJECTION, SOLUTION INTRAVENOUS CONTINUOUS
Status: DISPENSED | OUTPATIENT
Start: 2022-04-06 | End: 2022-04-06

## 2022-04-06 RX ORDER — NICOTINE POLACRILEX 4 MG
15 LOZENGE BUCCAL
Status: DISCONTINUED | OUTPATIENT
Start: 2022-04-06 | End: 2022-04-07 | Stop reason: HOSPADM

## 2022-04-06 RX ORDER — SODIUM CHLORIDE 9 MG/ML
75 INJECTION, SOLUTION INTRAVENOUS CONTINUOUS
Status: DISCONTINUED | OUTPATIENT
Start: 2022-04-06 | End: 2022-04-07 | Stop reason: HOSPADM

## 2022-04-06 RX ORDER — SODIUM CHLORIDE 9 MG/ML
INJECTION, SOLUTION INTRAVENOUS CONTINUOUS PRN
Status: COMPLETED | OUTPATIENT
Start: 2022-04-06 | End: 2022-04-06

## 2022-04-06 RX ORDER — DEXTROSE MONOHYDRATE 25 G/50ML
25 INJECTION, SOLUTION INTRAVENOUS
Status: DISCONTINUED | OUTPATIENT
Start: 2022-04-06 | End: 2022-04-07 | Stop reason: HOSPADM

## 2022-04-06 RX ORDER — ACETAMINOPHEN 325 MG/1
650 TABLET ORAL EVERY 4 HOURS PRN
Status: DISCONTINUED | OUTPATIENT
Start: 2022-04-06 | End: 2022-04-07 | Stop reason: HOSPADM

## 2022-04-06 RX ORDER — FENTANYL CITRATE 50 UG/ML
INJECTION, SOLUTION INTRAMUSCULAR; INTRAVENOUS AS NEEDED
Status: DISCONTINUED | OUTPATIENT
Start: 2022-04-06 | End: 2022-04-06 | Stop reason: HOSPADM

## 2022-04-06 RX ADMIN — LORAZEPAM 0.5 MG: 0.5 TABLET ORAL at 05:49

## 2022-04-06 RX ADMIN — VENLAFAXINE HYDROCHLORIDE 150 MG: 75 CAPSULE, EXTENDED RELEASE ORAL at 21:38

## 2022-04-06 RX ADMIN — FOLIC ACID 1000 MCG: 1 TABLET ORAL at 07:19

## 2022-04-06 RX ADMIN — HYDROCODONE BITARTRATE AND ACETAMINOPHEN 1 TABLET: 10; 325 TABLET ORAL at 09:42

## 2022-04-06 RX ADMIN — ONDANSETRON HYDROCHLORIDE 8 MG: 4 TABLET, FILM COATED ORAL at 09:42

## 2022-04-06 RX ADMIN — METOPROLOL SUCCINATE 50 MG: 50 TABLET, EXTENDED RELEASE ORAL at 13:55

## 2022-04-06 RX ADMIN — GABAPENTIN 400 MG: 400 CAPSULE ORAL at 07:25

## 2022-04-06 RX ADMIN — INSULIN DETEMIR 15 UNITS: 100 INJECTION, SOLUTION SUBCUTANEOUS at 21:40

## 2022-04-06 RX ADMIN — ATORVASTATIN CALCIUM 80 MG: 40 TABLET, FILM COATED ORAL at 07:19

## 2022-04-06 RX ADMIN — GABAPENTIN 400 MG: 400 CAPSULE ORAL at 21:38

## 2022-04-06 RX ADMIN — ASPIRIN 325 MG: 325 TABLET, COATED ORAL at 07:19

## 2022-04-06 RX ADMIN — CLOPIDOGREL BISULFATE 75 MG: 75 TABLET ORAL at 07:19

## 2022-04-06 RX ADMIN — INSULIN ASPART 6 UNITS: 100 INJECTION, SOLUTION INTRAVENOUS; SUBCUTANEOUS at 17:47

## 2022-04-06 RX ADMIN — LORAZEPAM 0.5 MG: 0.5 TABLET ORAL at 21:38

## 2022-04-06 RX ADMIN — PANTOPRAZOLE 20 MG: 20 TABLET, DELAYED RELEASE ORAL at 07:24

## 2022-04-06 RX ADMIN — MIRTAZAPINE 30 MG: 15 TABLET, FILM COATED ORAL at 21:39

## 2022-04-06 RX ADMIN — TAMSULOSIN HYDROCHLORIDE 0.4 MG: 0.4 CAPSULE ORAL at 16:18

## 2022-04-06 RX ADMIN — SODIUM CHLORIDE 150 ML/HR: 9 INJECTION, SOLUTION INTRAVENOUS at 17:47

## 2022-04-06 RX ADMIN — GABAPENTIN 400 MG: 400 CAPSULE ORAL at 16:18

## 2022-04-06 RX ADMIN — Medication 1 TABLET: at 07:25

## 2022-04-06 RX ADMIN — ISOSORBIDE MONONITRATE 30 MG: 30 TABLET, EXTENDED RELEASE ORAL at 07:25

## 2022-04-06 RX ADMIN — METHOCARBAMOL 500 MG: 500 TABLET, FILM COATED ORAL at 13:56

## 2022-04-06 RX ADMIN — Medication 10 ML: at 21:40

## 2022-04-06 RX ADMIN — ARIPIPRAZOLE 15 MG: 15 TABLET ORAL at 07:20

## 2022-04-06 RX ADMIN — LORAZEPAM 0.5 MG: 0.5 TABLET ORAL at 13:55

## 2022-04-06 RX ADMIN — SODIUM CHLORIDE 100 ML/HR: 9 INJECTION, SOLUTION INTRAVENOUS at 01:50

## 2022-04-06 NOTE — PROGRESS NOTES
[facility] Cardiology  INPATIENT PROGRESS NOTE    Name: Ariana Martinez  Age/Sex: 62 y.o. female  :  1960        PCP: Kimberly Ferguson APRN    Vital Signs  Temp:  [96.3 °F (35.7 °C)-97.6 °F (36.4 °C)] 97.2 °F (36.2 °C)  Heart Rate:  [66-79] 72  Resp:  [16-18] 16  BP: ()/(50-58) 107/54  Body mass index is 41.39 kg/m².      Subjective   No acute issues overnight.    Patient Active Problem List   Diagnosis   • 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, with long-term current use of insulin   • Closed nondisplaced fracture of fifth left metatarsal bone   • MAURICIO (generalized anxiety disorder)   • Depression, major, recurrent, moderate (HCC)   • GERD without esophagitis   • Long term prescription opiate use   • Mixed hyperlipidemia   • Vitamin D deficiency   • Seasonal allergic rhinitis   • Restrictive lung disease secondary to obesity   • Adult body mass index 37.0-37.9   • Snoring   • Class 3 severe obesity due to excess calories without serious comorbidity with body mass index (BMI) of 40.0 to 44.9 in adult (HCC)   • (HFpEF) heart failure with preserved ejection fraction (HCC)   • Type 2 diabetes mellitus with hyperglycemia, with long-term current use of insulin ([facility])   • Cyanocobalamin deficiency   • Coronary artery disease of native artery of native heart with stable angina pectoris ([facility])   • Hypertension   • Meniere's disease   • Gastroparesis   • Otitis media with effusion, left   • Pulmonary hypertension (HCC)   • Displaced fracture of fifth metatarsal bone, left foot, subsequent encounter for fracture with nonunion   • Pes cavus   • Primary osteoarthritis involving multiple joints   • Generalized anxiety disorder   • Chronic right-sided low back pain with right-sided sciatica   • Chest pain   • Thrombocytosis   • Leukocytosis   • Iron deficiency   • Adverse effect of iron   • Hindfoot varus, acquired, left   • Chest pain due to myocardial ischemia   •  Nonrheumatic tricuspid valve regurgitation   • Iron deficiency anemia due to chronic blood loss   • Malaise and fatigue   • Nonunion of subtalar arthrodesis   • Ankle arthritis   • Cellulitis of left lower extremity   • Urinary retention   • Acute bacterial endocarditis   • Staphylococcus aureus bacteremia   • Infection due to Acinetobacter species   • Endocarditis   • Left foot infection   • Uncontrolled hypertension   • Occlusion and stenosis of bilateral carotid arteries   • S/P BKA (below knee amputation) unilateral, left ([facility])   • Phantom pain after amputation of lower extremity ([facility])   • S/P CABG (coronary artery bypass graft)   • Acute colitis   • Severe malnutrition ([facility])   • Sepsis ([facility])   • TIA (transient ischemic attack)   • Chest pain, unspecified type   • Coronary artery abnormality       Past Medical History:   Diagnosis Date   • Angina, class IV ([facility])    • Anxiety    • Anxiety and depression    • Arthritis    • Benign paroxysmal positional vertigo    • Bladder disorder     has bladder stimulator   • Carpal tunnel syndrome    • CHF (congestive heart failure) ([facility])    • Chronic pain    • Coronary atherosclerosis     hx CABG 2005.  is followed by Dr Kwon   • Depression    • Diabetes mellitus ([facility])     Type 2, controlled   • Diabetic polyneuropathy ([facility])    • Disease of thyroid gland    • Elevated cholesterol    • Female stress incontinence    • Foot pain, left    • Full dentures    • Gastroparesis    • GERD (gastroesophageal reflux disease)    • Hyperlipidemia    • Hypertension    • Low back pain    • Malaise and fatigue    • Multiple joint pain    • Obesity     Refuses to be weighed   • Occlusion and stenosis of bilateral carotid arteries 6/18/2021   • Otalgia     Both   • Perforation of tympanic membrane     Left   • Postoperative wound infection    • Vitamin D deficiency    • Wears glasses     reading       Current Facility-Administered Medications   Medication Dose Route Frequency Provider Last  Rate Last Admin   • acetaminophen (TYLENOL) tablet 650 mg  650 mg Oral Q4H PRN Sheryl Navas MD       • ARIPiprazole (ABILIFY) tablet 15 mg  15 mg Oral Daily Sheryl Navas MD   15 mg at 04/06/22 0720   • aspirin EC tablet 325 mg  325 mg Oral Daily Sheryl Navas MD   325 mg at 04/06/22 0719   • atorvastatin (LIPITOR) tablet 80 mg  80 mg Oral Daily Sheryl Navas MD   80 mg at 04/06/22 0719   • sennosides-docusate (PERICOLACE) 8.6-50 MG per tablet 2 tablet  2 tablet Oral BID Sheryl Navas MD   2 tablet at 04/05/22 2114    And   • polyethylene glycol (MIRALAX) packet 17 g  17 g Oral Daily PRN Sheryl Navas MD        And   • bisacodyl (DULCOLAX) EC tablet 5 mg  5 mg Oral Daily PRN Sheryl Navas MD        And   • bisacodyl (DULCOLAX) suppository 10 mg  10 mg Rectal Daily PRN Sheryl Navas MD       • Calcium Carbonate-Vitamin D3 600-400 MG-UNIT tablet 1 tablet  1 tablet Oral Daily Sheryl Navas MD   1 tablet at 04/06/22 0725   • clopidogrel (PLAVIX) tablet 75 mg  75 mg Oral Daily Sheryl Navas MD   75 mg at 04/06/22 0719   • folic acid (FOLVITE) tablet 1,000 mcg  1,000 mcg Oral Daily Sheryl Navas MD   1,000 mcg at 04/06/22 0719   • gabapentin (NEURONTIN) capsule 400 mg  400 mg Oral TID Sheryl Navas MD   400 mg at 04/06/22 0725   • HYDROcodone-acetaminophen (NORCO)  MG per tablet 1 tablet  1 tablet Oral Q4H PRN Sheryl Navas MD   1 tablet at 04/06/22 0942   • HYDROcodone-acetaminophen (NORCO) 7.5-325 MG per tablet 1 tablet  1 tablet Oral Q6H PRN Sheryl Navas MD       • insulin detemir (LEVEMIR) injection 15 Units  15 Units Subcutaneous Nightly Sheryl Navas MD   15 Units at 04/05/22 2114   • isosorbide mononitrate (IMDUR) 24 hr tablet 30 mg  30 mg Oral Q24H Sheryl Navas MD   30 mg at 04/06/22 0720   • lisinopril (PRINIVIL,ZESTRIL) tablet 20 mg  20 mg Oral Daily Sheryl Navas MD       • LORazepam (ATIVAN) tablet 0.5 mg  0.5 mg Oral Q8H Sheryl Navas MD   0.5 mg at 04/06/22 1359   • meclizine  (ANTIVERT) tablet 25 mg  25 mg Oral TID PRN Sheryl Navas MD       • melatonin tablet 5.25 mg  5.25 mg Oral Nightly PRN Sheryl Navas MD       • methocarbamol (ROBAXIN) tablet 500 mg  500 mg Oral BID PRN Sheryl Navas MD   500 mg at 04/06/22 1356   • metoclopramide (REGLAN) tablet 10 mg  10 mg Oral 4x Daily PRN Sheryl Navas MD   10 mg at 04/05/22 1526   • metoprolol succinate XL (TOPROL-XL) 24 hr tablet 50 mg  50 mg Oral Daily Sheryl Navas MD   50 mg at 04/06/22 1355   • metoprolol tartrate (LOPRESSOR) injection 5 mg  5 mg Intravenous Q5 Min PRN Sheryl Navas MD   5 mg at 04/05/22 0937   • mirtazapine (REMERON) tablet 30 mg  30 mg Oral Nightly Sheryl Navas MD   30 mg at 04/05/22 2114   • nitroglycerin (NITROSTAT) SL tablet 0.4 mg  0.4 mg Sublingual Q5 Min PRN Sheryl Navas MD       • ondansetron (ZOFRAN) tablet 8 mg  8 mg Oral Q8H PRN Sheryl Navas MD   8 mg at 04/06/22 0942   • pantoprazole (PROTONIX) EC tablet 20 mg  20 mg Oral Daily Sheryl Navas MD   20 mg at 04/06/22 0724   • sodium chloride 0.9 % flush 10 mL  10 mL Intravenous PRN Sheryl Navas MD       • sodium chloride 0.9 % flush 10 mL  10 mL Intravenous Q12H Sheryl Navas MD   10 mL at 04/05/22 2114   • sodium chloride 0.9 % flush 10 mL  10 mL Intravenous PRN Sheryl Navas MD       • sodium chloride 0.9 % infusion  150 mL/hr Intravenous Continuous Sheryl Navas  mL/hr at 04/06/22 1356 150 mL/hr at 04/06/22 1356   • sodium chloride 0.9 % infusion  75 mL/hr Intravenous Continuous Sheryl Navas MD       • tamsulosin (FLOMAX) 24 hr capsule 0.4 mg  0.4 mg Oral Daily Sheryl Navas MD   0.4 mg at 04/05/22 1109   • venlafaxine XR (EFFEXOR-XR) 24 hr capsule 150 mg  150 mg Oral BID Sheryl Navas MD   150 mg at 04/05/22 2114       Past Surgical History:   Procedure Laterality Date   • ABDOMINAL SURGERY     • AMPUTATION FOOT     • ANGIOPLASTY      coronary   • ANKLE ARTHROSCOPY Left 2/26/2021    Procedure: Left foot hardware removal, ankle  arthroscopy, bone grafting , left foot exostectomy;  Surgeon: Ignacio Lord DPM;  Location: [facility] OR;  Service: Podiatry;  Laterality: Left;   • BREAST BIOPSY Right    • CARDIAC CATHETERIZATION     • CARDIAC CATHETERIZATION N/A 6/20/2017    Procedure: Right Heart Cath;  Surgeon: Can Kwon MD PhD;  Location: [facility] CATH INVASIVE LOCATION;  Service:    • CARDIAC CATHETERIZATION N/A 2/18/2020    Procedure: Left Heart Cath;  Surgeon: Catalina Cooper MD;  Location: [facility] CATH INVASIVE LOCATION;  Service: Cardiology;  Laterality: N/A;   • CARPAL TUNNEL RELEASE     • CHOLECYSTECTOMY     • COLONOSCOPY N/A 6/24/2020    Procedure: COLONOSCOPY;  Surgeon: Julián Maldonado MD;  Location: [facility] ENDOSCOPY;  Service: Gastroenterology;  Laterality: N/A;   • CORONARY ARTERY BYPASS GRAFT  2005   • ENDOSCOPY N/A 10/19/2018    Procedure: ESOPHAGOGASTRODUODENOSCOPY possible dilation;  Surgeon: Julián Maldonado MD;  Location: [facility] ENDOSCOPY;  Service: Gastroenterology   • ENDOSCOPY N/A 6/24/2020    Procedure: ESOPHAGOGASTRODUODENOSCOPY WED appt please;  Surgeon: Julián Maldonado MD;  Location: [facility] ENDOSCOPY;  Service: Gastroenterology;  Laterality: N/A;   • ENDOSCOPY AND COLONOSCOPY     • FOOT SURGERY      Toes   • FOOT SURGERY     • GASTRIC BANDING      Revision, laparoscopic   • HYSTERECTOMY     • INCISION AND DRAINAGE LEG Left 3/12/2021    Procedure: Left ankle arthroscopic irrigation and debridement, screw removal;  Surgeon: Ignacio Lord DPM;  Location: [facility] OR;  Service: Podiatry;  Laterality: Left;   • MOUTH SURGERY     • SALPINGO OOPHORECTOMY     • SHOULDER SURGERY     • SUBTALAR ARTHRODESIS Left 1/16/2019    Procedure: LEFT FOOT HARDWARE REMOVAL, FIFTH METATARSAL , OPEN REDUCTION INTERNAL FIXATION, CALCANEAL OSTEOTOMY;  Surgeon: Ignacio Lord DPM;  Location: [facility] OR;  Service: Podiatry   • SUBTALAR ARTHRODESIS Left 10/16/2019    Procedure: foot hardware removal, subtalar joint  fusion  possible de/reattachment of achilles tendon        (c-arm);  Surgeon: Ignacio Lord DPM;  Location: [facility];  Service: Podiatry   • SUBTALAR ARTHRODESIS Left 9/30/2020    Procedure: subtalar, talonavicular joint arthrodesis.  Removal hardware.          (c-arm);  Surgeon: Ignacio Lord DPM;  Location: [facility];  Service: Podiatry;  Laterality: Left;   • TRANSESOPHAGEAL ECHOCARDIOGRAM (LAMONTE)      With color flow       Social History     Socioeconomic History   • Marital status:    Tobacco Use   • Smoking status: Never Smoker   • Smokeless tobacco: Never Used   Vaping Use   • Vaping Use: Never used   Substance and Sexual Activity   • Alcohol use: No   • Drug use: No   • Sexual activity: Defer       O/E    Neck: Supple.  No JVD, no thyroid enlargement.  Chest: Air entry equal, normal respiration.  No rhonchi or creps.  Cardiovascular system:  Regular rate and rhythm, no murmurs.  Abdomen: Soft, no tenderness, bowel sounds present, no hepatosplenomegaly.  CNS: Alert, oriented to place and time.  No motor or sensory deficit.  Cranial nerves intact.  Musculoskeletal: No deformity of the back or spine.  Extremities:  No edema.  Pulses equal on both sides.      Lab Results (last 24 hours)     Procedure Component Value Units Date/Time    POC Glucose Once [[id number]]  (Abnormal) Collected: 04/05/22 1607    Specimen: Blood Updated: 04/05/22 1834     Glucose 216 mg/dL      Comment: RN NotifiedOperator: [id number] PETE BOURGEOISNIFERMeter ID: [id number]       POC Glucose Once [[id number]]  (Abnormal) Collected: 04/05/22 1927    Specimen: Blood Updated: 04/05/22 1954     Glucose 323 mg/dL      Comment: RN NotifiedOperator: [id number] CHUN SARAHMeter ID: [id number]       POC Glucose Once [[id number]]  (Abnormal) Collected: 04/06/22 0558    Specimen: Blood Updated: 04/06/22 0631     Glucose 276 mg/dL      Comment: RN NotifiedOperator: [id number] JAYY APRILMeter ID: [id number]       Basic Metabolic  Panel [[id number]]  (Abnormal) Collected: 04/06/22 0836    Specimen: Blood Updated: 04/06/22 0859     Glucose 285 mg/dL      BUN 26 mg/dL      Creatinine 1.60 mg/dL      Sodium 133 mmol/L      Potassium 4.7 mmol/L      Chloride 99 mmol/L      CO2 24.0 mmol/L      Calcium 8.8 mg/dL      BUN/Creatinine Ratio 16.3     Anion Gap 10.0 mmol/L      eGFR 36.8 mL/min/1.73      Comment: National Kidney Foundation and American Society of Nephrology (ASN) Task Force recommended calculation based on the Chronic Kidney Disease Epidemiology Collaboration (CKD-EPI) equation refit without adjustment for race.       Narrative:      GFR Normal >60  Chronic Kidney Disease <60  Kidney Failure <15      POC Glucose Once [[id number]]  (Abnormal) Collected: 04/06/22 1029    Specimen: Blood Updated: 04/06/22 1056     Glucose 361 mg/dL      Comment: RN NotifiedOperator: [id number] CARLIE SEOFERMeter ID: [id number]               A/P    Assessment plan:  1.  Coronary artery disease: Extensive history of coronary artery disease as above.  Has presented with recurrent chest pain despite being optimal medical therapy with aspirin/Plavix/Imdur/metoprolol     CT concerning for progression of disease.  Explained options including medical management versus invasive evaluation with the patient.  In setting of recurrent chest pain patient opted for invasive evaluation.  Cardiac cath was performed which showed patent stents in LAD/diagonal.  Chronic total occlusion of the LAD which is unchanged from before.  She does have a patent LIMA however it is attached to a very small caliber less than 2 mm apical LAD which has severe focal disease again this is unchanged from before and is not amenable to PCI.  RCA and left circumflex is mild nonobstructive disease.    Optimize antianginal therapy for apical LAD which is not amenable to PCI.  Continue aspirin/Plavix/Imdur and metoprolol.  We will add Ranexa.    Patient had bump in her creatinine post CTA.  We  will continue hydration today and monitor creatinine.          Patient's Body mass index is 41.39 kg/m². indicating that she is obese (BMI >30). Obesity-related health conditions include the following: coronary heart disease. Obesity is newly identified. BMI is is above average; BMI management plan is completed. We discussed portion control and increasing exercise..      Ariana Martinez  reports that she has never smoked. She has never used smokeless tobacco..              This document has been electronically signed by Sheryl Navas MD on April 6, 2022 15:36 CDT         Part of this note may be an electronic transcription/translation of spoken language to printed text using the Dragon Dictation System.

## 2022-04-06 NOTE — PROGRESS NOTES
[facility] Medicine   INPATIENT PROGRESS NOTE      Patient Name: Ariana Martinez  Date of Admission: 4/4/2022  Today's Date: 04/06/22  Length of Stay: 0  Primary Care Physician: Kimberly Ferguson APRN    Subjective   Chief Complaint: Chest pain    HPI   62-year-old female with morbid obesity, CAD with history of CABG in 2005, PAD, left BKA, chronic diastolic heart failure, chronic back pain, anxiety/depression, GERD, diabetes with neuropathy, presented to the ED with chest pain described as intermittent pressure that started yesterday evening.  Describes some radiation to the back, with some associated nausea and epigastric discomfort.  No diaphoresis and no associated shortness of breath.  She tells me she had an echo fairly recently with her cardiologist outpatient, however she has not had any recent ischemic evaluation.  Initial work-up in the ED included EKG without overt ischemic changes and troponin negative.  No evidence of volume retention or volume overload with normal proBNP and no acute findings on chest x-ray.  Patient admitted for further monitoring.  No acute events on telemetry.  Serial troponins remain negative.  Obtained CTA coronary study which returned abnormal, so cardiology consulted.      Chest and epigastric comfort improved today.  No shortness of breath or palpitations.  Resting comfortably.  Patient going for cardiac catheterization today.        Review of Systems     14 point review of systems completed and is negative except as otherwise noted    Objective    Temp:  [96.3 °F (35.7 °C)-97.6 °F (36.4 °C)] 97.2 °F (36.2 °C)  Heart Rate:  [66-79] 73  Resp:  [18] 18  BP: ()/(48-58) 109/53  Physical Exam  General: Resting in no acute distress  Psych: Normal mood and affect, calm and cooperative  HEENT: NC/AT, no scleral icterus  Cardiovascular: Normal rate, regular rhythm, pulses 2+  Respiratory: Diminished, no wheezes rales or  rhonchi  Abdomen: Soft, minimal epigastric tenderness without guarding, bowel sounds present  Neurologic: Alert, follows commands, normal speech  Musculoskeletal: Left BKA  Extremities: No clubbing or cyanosis  Skin: Warm dry, well perfused      Results Review:  I have reviewed the labs, radiology results, and diagnostic studies.    Laboratory Data:   Results from last 7 days   Lab Units 04/04/22  1306   WBC 10*3/mm3 11.36*   HEMOGLOBIN g/dL 11.1*   HEMATOCRIT % 32.3*   PLATELETS 10*3/mm3 315        Results from last 7 days   Lab Units 04/06/22  0836 04/04/22  1306   SODIUM mmol/L 133* 132*   POTASSIUM mmol/L 4.7 4.5   CHLORIDE mmol/L 99 98   CO2 mmol/L 24.0 23.0   BUN mg/dL 26* 27*   CREATININE mg/dL 1.60* 1.27*   CALCIUM mg/dL 8.8 8.9   BILIRUBIN mg/dL  --  0.4   ALK PHOS U/L  --  131*   ALT (SGPT) U/L  --  15   AST (SGOT) U/L  --  12   GLUCOSE mg/dL 285* 167*       Culture Data:   No results found for: BLOODCX  No results found for: URINECX  No results found for: RESPCX  No results found for: WOUNDCX  No results found for: STOOLCX  No components found for: BODYFLD    Radiology Data:   Imaging Results (Last 24 Hours)     Procedure Component Value Units Date/Time    CT Angiogram Coronary [[id number]] Collected: 04/05/22 1003     Updated: 04/05/22 1353    Narrative:      CT coronary arteriogram. Calcium score. Functional analysis.      CLINICAL HISTORY: 62-year-old female presenting with chest pain.        COMPARISON: Chest x-ray April 4, 2022.    Post processing was performed by the radiologist at the [employer]  workstation. Serial axial CT images were obtained through the  heart at 3 mm thickness without contrast for calcium scoring. 3D  images including vessel probing technique were also obtained.  Subsequently, following the intravenous administration of 90     ml of Isovue-370   , serial axial CT images were obtained through  the heart at 0.6 mm thickness utilizing retrospective gating.  Images were obtained after  premedication with five    mg of  metoprolol IV, x3. Heart rate 78-82.  Full field of view  reconstructed images were used for evaluation of the extracardiac  tissues.    This exam was performed according to our departmental  dose-optimization program, which includes automated exposure  control, adjustment of the mA and/or kV according to patient size  and/or use of iterative reconstruction technique.      CALCIUM PLAQUE BURDEN:    REGION                                         CALCIUM SCORE  ([place])  Left Main                                                       133      Right Coronary Artery                                 50   Left Anterior Descending                            750   Circumflex                                                    0       Posterior Descending Artery                       0             Your Calcium Score is 933   .      This places the patent in the high    risk for coronary artery  disease. (Extensive atherosclerotic plaque. High likelihood of at  least one significant coronary narrowing.) Known history of  coronary artery disease and bypass surgery.    CTA OF THE CORONARY ARTERIES:  Left Main: There is some calcified plaque without stenosis.      There is a MESSINA to LAD graft. The graft is very thin, very poor  flow and only faintly visualized. (Nonfunctional).    There are stents in the LAD and stents in the D1 branch.  The distal LAD and D1 branch are completely occluded.      Circumflex: No calcified or soft tissue density plaque and no  stenosis.  RCA: Calcified plaque proximal right coronary artery causing  narrowing of 50-60%. Right coronary artery dominant.      The aortic valve is tricuspid. There is no myocardial bridging.  On short axis views, the myocardium is homogeneous in thickness.    EXTRACARDIAC SOFT TISSUES:  Mediastinum: On the imaging, the ascending and descending aorta  are normal in caliber. There is no mediastinal or hilar  lymphadenopathy. The pericardium  is normal.  Lungs: No consolidation or focal nodules are present. There is no  pneumothorax or effusion. The pulmonary arteries are normal in  appearance.  Abdomen: Prior cholecystectomy. No evidence of hiatal hernia. The  imaged liver and spleen are unremarkable.     Bones: There are no lytic or blastic lesions within the osseous  structures.    CT FUNCTIONAL ANALYSIS:  Ejection Fraction      67    %  Diastolic Volume      134    ml  Systolic Volume       44    ml  Stroke Volume         90    ml  Cardiac Output        7.2    L/minute      Impression:      Abnormal CT coronary arteriogram.  1. There is a LIMA to LAD graft. The graft is very thin, very  poor flow and only faintly visualized (nonfunctional)  2. There are stents in the LAD and D1 branch. The LAD and D1  branch distal to the stents are occluded.  3. Normal circumflex.  4. Calcified plaque proximal right coronary artery causing  narrowing of 50-60%.    Calcium score 933. (Known history of coronary artery disease,  stents and bypass surgery.)    Normal functional analysis. Computer-assisted calculation of left  ventricular ejection fraction 67%.    Electronically signed by:  Naveed Taylor MD  4/5/2022 1:51 PM CDT  Workstation: [id number]          I have reviewed the patient's current medications.     Assessment/Plan     Active Hospital Problems    Diagnosis    • **Chest pain    • Coronary artery abnormality    • Coronary artery disease of native artery of native heart with stable angina pectoris (HCC)      -Continue metoprolol and Plavix     • Class 3 severe obesity due to excess calories without serious comorbidity with body mass index (BMI) of 40.0 to 44.9 in adult (HCC)    • Restrictive lung disease secondary to obesity    • MAURICIO (generalized anxiety disorder)      Continue Lorazepam 1 mg BID PRN     • Depression, major, recurrent, moderate (HCC)      Continue home Effexor, Remeron, Ativan, Abilify     • GERD without esophagitis      Continue  protonix     • Type 2 diabetes mellitus with hyperglycemia, with long-term current use of insulin (HCC)      Continue Gabapentin 800MG TID.    Formatting of this note might be different from the original.  IMO clean-up         Chest pain with known CAD, remote history of CABG  CTA coronary study returned abnormal  Cardiology following  --- Cardiac catheterization today, follow-up results, postoperative access site monitoring following cardiac catheterization  Follow telemetry  Serial troponins negative  Serial EKGs as warranted  As needed nitro for any anginal chest pain    Chronic diastolic heart failure  Home Lasix    Diabetes  Glucose monitoring, will continue basal insulin with adjustment as needed  Continue corrective sliding scale insulin as warranted  Avoid hypoglycemia    Anxiety  Continue home anxiolytic regimen    GERD  PPI      Disposition: TBD following cardiology recommendations after heart cath, possible discharge home tomorrow        Electronically signed by Larry Lopez MD, 04/06/22, 12:34 CDT.

## 2022-04-06 NOTE — SIGNIFICANT NOTE
04/06/22 1320   OTHER   Discipline physical therapy assistant   Rehab Time/Intention   Session Not Performed patient unavailable for treatment  (Pt off floor for testing.)

## 2022-04-07 ENCOUNTER — READMISSION MANAGEMENT (OUTPATIENT)
Dept: CALL CENTER | Facility: HOSPITAL | Age: 62
End: 2022-04-07

## 2022-04-07 VITALS
TEMPERATURE: 97.5 F | SYSTOLIC BLOOD PRESSURE: 149 MMHG | HEIGHT: 68 IN | BODY MASS INDEX: 40.83 KG/M2 | RESPIRATION RATE: 16 BRPM | DIASTOLIC BLOOD PRESSURE: 70 MMHG | WEIGHT: 269.4 LBS | OXYGEN SATURATION: 92 % | HEART RATE: 76 BPM

## 2022-04-07 LAB
ANION GAP SERPL CALCULATED.3IONS-SCNC: 8 MMOL/L (ref 5–15)
BUN SERPL-MCNC: 20 MG/DL (ref 8–23)
BUN/CREAT SERPL: 15.6 (ref 7–25)
CALCIUM SPEC-SCNC: 9.2 MG/DL (ref 8.6–10.5)
CHLORIDE SERPL-SCNC: 103 MMOL/L (ref 98–107)
CO2 SERPL-SCNC: 26 MMOL/L (ref 22–29)
CREAT SERPL-MCNC: 1.28 MG/DL (ref 0.57–1)
DEPRECATED RDW RBC AUTO: 45.6 FL (ref 37–54)
EGFRCR SERPLBLD CKD-EPI 2021: 48.1 ML/MIN/1.73
ERYTHROCYTE [DISTWIDTH] IN BLOOD BY AUTOMATED COUNT: 13.5 % (ref 12.3–15.4)
GLUCOSE BLDC GLUCOMTR-MCNC: 306 MG/DL (ref 70–130)
GLUCOSE BLDC GLUCOMTR-MCNC: 326 MG/DL (ref 70–130)
GLUCOSE SERPL-MCNC: 322 MG/DL (ref 65–99)
HCT VFR BLD AUTO: 36.3 % (ref 34–46.6)
HGB BLD-MCNC: 11.7 G/DL (ref 12–15.9)
MCH RBC QN AUTO: 29.8 PG (ref 26.6–33)
MCHC RBC AUTO-ENTMCNC: 32.2 G/DL (ref 31.5–35.7)
MCV RBC AUTO: 92.4 FL (ref 79–97)
PLATELET # BLD AUTO: 312 10*3/MM3 (ref 140–450)
PMV BLD AUTO: 9.9 FL (ref 6–12)
POTASSIUM SERPL-SCNC: 5.2 MMOL/L (ref 3.5–5.2)
QT INTERVAL: 412 MS
QTC INTERVAL: 460 MS
RBC # BLD AUTO: 3.93 10*6/MM3 (ref 3.77–5.28)
SODIUM SERPL-SCNC: 137 MMOL/L (ref 136–145)
WBC NRBC COR # BLD: 7.27 10*3/MM3 (ref 3.4–10.8)

## 2022-04-07 PROCEDURE — 99214 OFFICE O/P EST MOD 30 MIN: CPT | Performed by: INTERNAL MEDICINE

## 2022-04-07 PROCEDURE — 63710000001 ONDANSETRON PER 8 MG: Performed by: INTERNAL MEDICINE

## 2022-04-07 PROCEDURE — 93005 ELECTROCARDIOGRAM TRACING: CPT | Performed by: INTERNAL MEDICINE

## 2022-04-07 PROCEDURE — 82962 GLUCOSE BLOOD TEST: CPT

## 2022-04-07 PROCEDURE — G0378 HOSPITAL OBSERVATION PER HR: HCPCS

## 2022-04-07 PROCEDURE — 63710000001 INSULIN ASPART PER 5 UNITS: Performed by: STUDENT IN AN ORGANIZED HEALTH CARE EDUCATION/TRAINING PROGRAM

## 2022-04-07 PROCEDURE — 85027 COMPLETE CBC AUTOMATED: CPT | Performed by: INTERNAL MEDICINE

## 2022-04-07 PROCEDURE — 80048 BASIC METABOLIC PNL TOTAL CA: CPT | Performed by: INTERNAL MEDICINE

## 2022-04-07 RX ORDER — RANOLAZINE 500 MG/1
500 TABLET, EXTENDED RELEASE ORAL EVERY 12 HOURS SCHEDULED
Qty: 60 TABLET | Refills: 3 | Status: SHIPPED | OUTPATIENT
Start: 2022-04-07 | End: 2022-04-07 | Stop reason: SDUPTHER

## 2022-04-07 RX ORDER — RANOLAZINE 500 MG/1
500 TABLET, EXTENDED RELEASE ORAL EVERY 12 HOURS SCHEDULED
Qty: 60 TABLET | Refills: 3 | Status: SHIPPED | OUTPATIENT
Start: 2022-04-07 | End: 2022-06-29 | Stop reason: SDUPTHER

## 2022-04-07 RX ORDER — ISOSORBIDE MONONITRATE 30 MG/1
30 TABLET, EXTENDED RELEASE ORAL
Qty: 30 TABLET | Refills: 3 | Status: SHIPPED | OUTPATIENT
Start: 2022-04-07 | End: 2022-08-29 | Stop reason: SDUPTHER

## 2022-04-07 RX ORDER — RANOLAZINE 500 MG/1
500 TABLET, EXTENDED RELEASE ORAL EVERY 12 HOURS SCHEDULED
Status: DISCONTINUED | OUTPATIENT
Start: 2022-04-07 | End: 2022-04-07 | Stop reason: HOSPADM

## 2022-04-07 RX ADMIN — ACETAMINOPHEN 650 MG: 325 TABLET ORAL at 11:30

## 2022-04-07 RX ADMIN — ISOSORBIDE MONONITRATE 30 MG: 30 TABLET, EXTENDED RELEASE ORAL at 07:16

## 2022-04-07 RX ADMIN — Medication 1 TABLET: at 07:15

## 2022-04-07 RX ADMIN — INSULIN ASPART 7 UNITS: 100 INJECTION, SOLUTION INTRAVENOUS; SUBCUTANEOUS at 07:13

## 2022-04-07 RX ADMIN — METHOCARBAMOL 500 MG: 500 TABLET, FILM COATED ORAL at 03:50

## 2022-04-07 RX ADMIN — ASPIRIN 325 MG: 325 TABLET, COATED ORAL at 07:14

## 2022-04-07 RX ADMIN — METOPROLOL SUCCINATE 50 MG: 50 TABLET, EXTENDED RELEASE ORAL at 07:16

## 2022-04-07 RX ADMIN — RANOLAZINE 500 MG: 500 TABLET, FILM COATED, EXTENDED RELEASE ORAL at 09:35

## 2022-04-07 RX ADMIN — ONDANSETRON HYDROCHLORIDE 8 MG: 4 TABLET, FILM COATED ORAL at 03:28

## 2022-04-07 RX ADMIN — LISINOPRIL 20 MG: 20 TABLET ORAL at 07:14

## 2022-04-07 RX ADMIN — SODIUM CHLORIDE 75 ML/HR: 9 INJECTION, SOLUTION INTRAVENOUS at 09:38

## 2022-04-07 RX ADMIN — GABAPENTIN 400 MG: 400 CAPSULE ORAL at 07:16

## 2022-04-07 RX ADMIN — INSULIN ASPART 7 UNITS: 100 INJECTION, SOLUTION INTRAVENOUS; SUBCUTANEOUS at 11:31

## 2022-04-07 RX ADMIN — ARIPIPRAZOLE 15 MG: 15 TABLET ORAL at 07:14

## 2022-04-07 RX ADMIN — PANTOPRAZOLE 20 MG: 20 TABLET, DELAYED RELEASE ORAL at 07:15

## 2022-04-07 RX ADMIN — FOLIC ACID 1000 MCG: 1 TABLET ORAL at 07:16

## 2022-04-07 RX ADMIN — CLOPIDOGREL BISULFATE 75 MG: 75 TABLET ORAL at 07:15

## 2022-04-07 RX ADMIN — LORAZEPAM 0.5 MG: 0.5 TABLET ORAL at 11:31

## 2022-04-07 RX ADMIN — TAMSULOSIN HYDROCHLORIDE 0.4 MG: 0.4 CAPSULE ORAL at 07:15

## 2022-04-07 RX ADMIN — VENLAFAXINE HYDROCHLORIDE 150 MG: 75 CAPSULE, EXTENDED RELEASE ORAL at 07:15

## 2022-04-07 RX ADMIN — LORAZEPAM 0.5 MG: 0.5 TABLET ORAL at 05:52

## 2022-04-07 RX ADMIN — ATORVASTATIN CALCIUM 80 MG: 40 TABLET, FILM COATED ORAL at 07:14

## 2022-04-07 NOTE — OUTREACH NOTE
Prep Survey    Flowsheet Row Responses   [facility] patient discharged from? [place]   Is LACE score < 7 ? No   Emergency Room discharge w/ pulse ox? No   Eligibility [facility]   Date of Admission 04/04/22   Date of Discharge 04/07/22   Discharge Disposition Home or Self Care   Discharge diagnosis Chest pain,   Left Heart Cath   Does the patient have one of the following disease processes/diagnoses(primary or secondary)? Other   Does the patient have Home health ordered? No   Is there a DME ordered? No   Prep survey completed? Yes          ANGELA BEYER - Registered Nurse

## 2022-04-07 NOTE — DISCHARGE SUMMARY
[facility] Medicine Services  DISCHARGE SUMMARY       Date of Admission: 4/4/2022  Date of Discharge:  4/7/2022  Primary Care Physician: Kimberly Ferguson APRN    Presenting Problem/History of Present Illness:  Weakness [R53.1]  CHON (acute kidney injury) (HCC) [N17.9]  Hypotension, unspecified hypotension type [I95.9]  Chest pain, unspecified type [R07.9]     Final Discharge Diagnoses:  Active Hospital Problems    Diagnosis    • **Chest pain    • Coronary artery abnormality    • Coronary artery disease of native artery of native heart with stable angina pectoris (HCC)      -Continue metoprolol and Plavix     • Class 3 severe obesity due to excess calories without serious comorbidity with body mass index (BMI) of 40.0 to 44.9 in adult (HCC)    • Restrictive lung disease secondary to obesity    • MAURICIO (generalized anxiety disorder)      Continue Lorazepam 1 mg BID PRN     • Depression, major, recurrent, moderate (HCC)      Continue home Effexor, Remeron, Ativan, Abilify     • GERD without esophagitis      Continue protonix     • Type 2 diabetes mellitus with hyperglycemia, with long-term current use of insulin (HCC)      Continue Gabapentin 800MG TID.    Formatting of this note might be different from the original.  IMO clean-up         Consults:   Consults     Date and Time Order Name Status Description    4/5/2022  2:15 PM Inpatient Cardiology Consult Completed           Procedures Performed: Procedure(s):  Left Heart Cath                Pertinent Test Results:   Lab Results (last 24 hours)     Procedure Component Value Units Date/Time    POC Glucose Once [[id number]]  (Abnormal) Collected: 04/07/22 1037    Specimen: Blood Updated: 04/07/22 1126     Glucose 326 mg/dL      Comment: RN NotifiedOperator: [id number] DEE BRUNOAMeter ID: [id number]       Basic Metabolic Panel [[id number]]  (Abnormal) Collected: 04/07/22 0629    Specimen: Blood Updated: 04/07/22 0713     Glucose 322 mg/dL      BUN 20  mg/dL      Creatinine 1.28 mg/dL      Sodium 137 mmol/L      Potassium 5.2 mmol/L      Chloride 103 mmol/L      CO2 26.0 mmol/L      Calcium 9.2 mg/dL      BUN/Creatinine Ratio 15.6     Anion Gap 8.0 mmol/L      eGFR 48.1 mL/min/1.73      Comment: National Kidney Foundation and American Society of Nephrology (ASN) Task Force recommended calculation based on the Chronic Kidney Disease Epidemiology Collaboration (CKD-EPI) equation refit without adjustment for race.       Narrative:      GFR Normal >60  Chronic Kidney Disease <60  Kidney Failure <15      CBC (No Diff) [[id number]]  (Abnormal) Collected: 04/07/22 0629    Specimen: Blood Updated: 04/07/22 0704     WBC 7.27 10*3/mm3      RBC 3.93 10*6/mm3      Hemoglobin 11.7 g/dL      Hematocrit 36.3 %      MCV 92.4 fL      MCH 29.8 pg      MCHC 32.2 g/dL      RDW 13.5 %      RDW-SD 45.6 fl      MPV 9.9 fL      Platelets 312 10*3/mm3     POC Glucose Once [[id number]]  (Abnormal) Collected: 04/07/22 0610    Specimen: Blood Updated: 04/07/22 0643     Glucose 306 mg/dL      Comment: RN NotifiedOperator: [id number] POOJA EMANUELNMguilherme ID: [id number]       POC Glucose Once [[id number]]  (Abnormal) Collected: 04/06/22 2008    Specimen: Blood Updated: 04/06/22 2051     Glucose 346 mg/dL      Comment: Result Not ConfirmedOperator: [id number] MALATHIPENG ISAACMeter ID: [id number]       POC Glucose Once [[id number]]  (Abnormal) Collected: 04/06/22 1605    Specimen: Blood Updated: 04/06/22 1637     Glucose 271 mg/dL      Comment: RN NotifiedOperator: [id number] CARLIE TELLEZMeter ID: [id number]           Chief Complaint on Day of Discharge: No complaints    Hospital Course:   62-year-old female with morbid obesity, CAD with history of CABG in 2005, PAD, left BKA, chronic diastolic heart failure, chronic back pain, anxiety/depression, GERD and diabetes with neuropathy that presented to [facility] on 4/4/2022 with complaints of chest pain.      CTA of the coronaries was concerning for  "progression of CAD. The patient underwent coronary angiogram with the below results:    1.  One-vessel obstructive coronary artery disease involving chronic total occlusion of the LAD.  LIMA to LAD is patent, target vessel is small in caliber with severe diffuse disease but not amenable to PCI  2.  Patent stents in the LAD extending into the diagonal  3.  Mild disease in the RCA and left circumflex which is unchanged from before  4.  Normal left-sided filling pressures    The patient was recommended optimal medical therapy by continuing aspirin, Plavix, Imdur and Metoprolol.   Ranexa 500 mg PO BID was added at discharge.  Follow with PCP in one week.  Follow with Dr. Gibson in 4-6 weeks.     Condition on Discharge:  Stable    Physical Exam on Discharge:  /70 (BP Location: Right arm, Patient Position: Lying)   Pulse 76   Temp 97.5 °F (36.4 °C) (Temporal)   Resp 16   Ht 172.7 cm (68\")   Wt 122 kg (269 lb 6.4 oz)   SpO2 92%   BMI 40.96 kg/m²   Physical Exam  Constitutional:       Appearance: She is well-developed.   HENT:      Head: Normocephalic and atraumatic.   Eyes:      Pupils: Pupils are equal, round, and reactive to light.   Cardiovascular:      Rate and Rhythm: Normal rate and regular rhythm.   Pulmonary:      Effort: Pulmonary effort is normal.      Breath sounds: Normal breath sounds.   Abdominal:      General: Bowel sounds are normal.      Palpations: Abdomen is soft.   Musculoskeletal:         General: Normal range of motion.      Cervical back: Normal range of motion and neck supple.   Skin:     General: Skin is warm and dry.   Neurological:      Mental Status: She is alert and oriented to person, place, and time.   Psychiatric:         Behavior: Behavior normal.       Discharge Disposition:  Home or Self Care    Discharge Medications:     Discharge Medications      New Medications      Instructions Start Date   famotidine 20 MG tablet  Commonly known as: Pepcid   20 mg, Oral, 2 Times Daily " PRN      isosorbide mononitrate 30 MG 24 hr tablet  Commonly known as: IMDUR   30 mg, Oral, Every 24 Hours Scheduled      ranolazine 500 MG 12 hr tablet  Commonly known as: RANEXA   500 mg, Oral, Every 12 Hours Scheduled         Changes to Medications      Instructions Start Date   pantoprazole 20 MG EC tablet  Commonly known as: PROTONIX  What changed: how much to take   40 mg, Oral, Daily         Continue These Medications      Instructions Start Date   Accu-Chek Guide test strip  Generic drug: glucose blood   USE AS INSTRUCTED      accu-chek soft touch lancets   As directed      ARIPiprazole 15 MG tablet  Commonly known as: ABILIFY   TAKE 1 TABLET BY MOUTH EVERY DAY      aspirin  MG tablet   325 mg, Oral, Daily      atorvastatin 80 MG tablet  Commonly known as: LIPITOR   TAKE 1 TABLET BY MOUTH EVERY DAY      B-D ULTRAFINE III SHORT PEN 31G X 8 MM misc  Generic drug: Insulin Pen Needle   Use up to 4 (four) times daily with insulin as directed.      B-D ULTRAFINE III SHORT PEN 31G X 8 MM misc  Generic drug: Insulin Pen Needle   USE TO INJECT 5 TIMES A DAY      BASAGLAR KWIKPEN 100 UNIT/ML injection pen   60 Units, Subcutaneous, 2 Times Daily      BD [employer] misc   1 each, Does not apply, Take As Directed      Citracal/Vitamin D 250-200 MG-UNIT tablet  Generic drug: Calcium Citrate-Vitamin D   2 tablets, Oral, 2 Times Daily      clopidogrel 75 MG tablet  Commonly known as: PLAVIX   75 mg, Oral, Daily      cyanocobalamin 1000 MCG/ML injection   1,000 mcg, Intramuscular, Every 28 Days      folic acid 1 MG tablet  Commonly known as: FOLVITE   TAKE 1 TABLET BY MOUTH EVERY DAY      furosemide 40 MG tablet  Commonly known as: LASIX   TAKE 1 TABLET BY MOUTH EVERY DAY      gabapentin 400 MG capsule  Commonly known as: NEURONTIN   400 mg, Oral, 3 Times Daily      glucose monitor monitoring kit   1 each, Does not apply, Daily, accucheck eve meter, E11.9      hydroCHLOROthiazide 25 MG tablet  Commonly  "known as: HYDRODIURIL   TAKE 1 TABLET BY MOUTH EVERY DAY      HYDROcodone-acetaminophen 7.5-325 MG per tablet  Commonly known as: NORCO   1 tablet, Oral, Every 6 Hours PRN      Hypodermic Needle 20G X 1\" misc   1 each, Does not apply, Every 28 Days, For drawing up B12 for injection monthly, change back to smaller gauge needle prior to injection. Dx Vitamin B12  Deficiency.      lisinopril 20 MG tablet  Commonly known as: PRINIVIL,ZESTRIL   TAKE 1 TABLET BY MOUTH EVERY DAY      LORazepam 0.5 MG tablet  Commonly known as: ATIVAN   0.5 mg, Oral, Every 8 Hours      Luer Lock Safety Syringes 25G X 5/8\" 3 ML misc  Generic drug: Syringe/Needle (Disp)   1 each, Does not apply, Every 28 Days, 1 Q28 DAYS for injection B12/ cyanocobalomin. Dx vitamin D deficiency.      meclizine 25 MG tablet  Commonly known as: ANTIVERT   25 mg, Oral, 3 Times Daily PRN      methocarbamol 500 MG tablet  Commonly known as: ROBAXIN   500 mg, Oral, 2 Times Daily PRN      metoclopramide 10 MG tablet  Commonly known as: REGLAN   10 mg, Oral, 4 Times Daily PRN      metoprolol succinate XL 50 MG 24 hr tablet  Commonly known as: TOPROL-XL   50 mg, Oral, Daily, Dose increased 5/24/21      mirtazapine 30 MG tablet  Commonly known as: REMERON   TAKE 1 TABLET BY MOUTH EVERY DAY AT NIGHT      naloxone 0.4 MG/ML injection  Commonly known as: NARCAN   0.2 mg, Intravenous, Every 5 Minutes PRN      nitroglycerin 0.4 MG SL tablet  Commonly known as: NITROSTAT   0.4 mg, Sublingual, Every 5 Minutes PRN, Take no more than 3 doses in 15 minutes.      NovoLOG FlexPen 100 UNIT/ML solution pen-injector sc pen  Generic drug: insulin aspart   Inject 0 to 14 units under the skin into the appropriate area 3 (three) times daily before meals according to sliding scale on attached sheet.      NovoLOG 100 UNIT/ML injection  Generic drug: insulin aspart   INJECT 8 UNITS SUBCUTANEOUSLY 3 TIMES DAILY WITH MEALS.      ondansetron 8 MG tablet  Commonly known as: ZOFRAN   8 mg, " Oral, Every 8 Hours PRN      ONE TOUCH ULTRA MINI w/Device kit   Patient will need the Accu-Check Avitio meter      venlafaxine  MG 24 hr capsule  Commonly known as: EFFEXOR-XR   150 mg, Oral, 2 Times Daily      vitamin D 1.25 MG (78848 UT) capsule capsule  Commonly known as: ERGOCALCIFEROL   TAKE ONE CAPSULE BY MOUTH EVERY SUNDAY.             Discharge Diet:   Diet Instructions     Diet: Cardiac      Discharge Diet: Cardiac          Activity at Discharge:   Activity Instructions     Activity as Tolerated            Discharge Care Plan/Instructions: As above    Follow-up Appointment:   Follow-up Information     Ferguson, BEBE Luu. Go on 4/14/2022.    Specialties: Family Medicine, Nurse Practitioner  Why: THURSDAY APRIL 14TH 10:00 HOSPITAL FOLLOW UP APPOINTMENT  Contact information:  [address] [place] [address] [address] [place] [address] [phone]                           This document has been electronically signed by BEBE Mosher on April 7, 2022 14:33 CDT        Time: Greater than 30 minutes.

## 2022-04-07 NOTE — PROGRESS NOTES
"  [facility] Note   LOS: 0 days   Patient Care Team:  Ferguson, BEBE Luu as PCP - General (Family Medicine)  Uziel Alonso MD as Consulting Physician (Hematology and Oncology)  Elvis Gamboa APRN as Nurse Practitioner (Endocrinology)  Teressa Hammond APRN as Nurse Practitioner (Oncology)    Chief Complaint   Patient presents with   • Hypotension   • Chest Pain         Subjective     Interval History:   Patient Denies: shortness of air, orthopnea, palpitations, dizziness, syncope and edema  Patient Complaints: chest pain  History taken from: patient    Still has epigastric pain and burning. Non-cardiac chest pain.   Cath site WNL. She has not ambulated due to not having her prothesis on her left leg. She does not move around much at home. Pain is at rest.       Objective     Vital Sign Min/Max for last 24 hours  Temp  Min: 96.8 °F (36 °C)  Max: 97.6 °F (36.4 °C)   BP  Min: 92/50  Max: 149/70   Pulse  Min: 65  Max: 76   Resp  Min: 16  Max: 18   SpO2  Min: 91 %  Max: 97 %   No data recorded   Weight  Min: 122 kg (269 lb 6.4 oz)  Max: 122 kg (269 lb 6.4 oz)     Flowsheet Rows    Flowsheet Row First Filed Value   Admission Height 172.7 cm (68\") Documented at 04/04/2022 1203   Admission Weight 109 kg (240 lb) Documented at 04/04/2022 1203            04/05/22  0500 04/06/22  0354 04/07/22  0557   Weight: 123 kg (271 lb 3.2 oz) 123 kg (272 lb 3.2 oz) 122 kg (269 lb 6.4 oz)       Physical Exam:  Physical Exam  Vitals and nursing note reviewed.   Constitutional:       General: She is not in acute distress.     Appearance: She is well-developed. She is not diaphoretic.   HENT:      Head: Normocephalic.   Eyes:      Conjunctiva/sclera: Conjunctivae normal.   Neck:      Vascular: No JVD.   Cardiovascular:      Rate and Rhythm: Normal rate and regular rhythm.      Heart sounds: Normal heart sounds, S1 normal and S2 normal. No murmur heard.    No friction rub. No gallop.   Pulmonary:      Effort: " Pulmonary effort is normal. No respiratory distress.      Breath sounds: Normal breath sounds. No wheezing or rales.   Abdominal:      General: Bowel sounds are normal. There is no distension.      Palpations: Abdomen is soft.   Skin:     General: Skin is warm and dry.      Findings: No erythema.   Neurological:      Mental Status: She is alert and oriented to person, place, and time.   Psychiatric:         Behavior: Behavior normal.         Thought Content: Thought content normal.         Judgment: Judgment normal.          Results Reviewed by myself:     Results from last 7 days   Lab Units 04/07/22  0629 04/06/22  0836 04/04/22  1306   SODIUM mmol/L 137 133* 132*   POTASSIUM mmol/L 5.2 4.7 4.5   CHLORIDE mmol/L 103 99 98   CO2 mmol/L 26.0 24.0 23.0   BUN mg/dL 20 26* 27*   CREATININE mg/dL 1.28* 1.60* 1.27*   CALCIUM mg/dL 9.2 8.8 8.9   BILIRUBIN mg/dL  --   --  0.4   ALK PHOS U/L  --   --  131*   ALT (SGPT) U/L  --   --  15   AST (SGOT) U/L  --   --  12   GLUCOSE mg/dL 322* 285* 167*       Estimated Creatinine Clearance: 64.3 mL/min (A) (by C-G formula based on SCr of 1.28 mg/dL (H)).                Results from last 7 days   Lab Units 04/07/22  0629 04/04/22  1306   WBC 10*3/mm3 7.27 11.36*   HEMOGLOBIN g/dL 11.7* 11.1*   PLATELETS 10*3/mm3 312 315           Lab Results   Component Value Date    PROBNP 236.4 04/04/2022       I/O last 3 completed shifts:  In: 2066.7 [P.O.:1000; I.V.:1066.7]  Out: 6100 [Urine:6100]    Cardiographics:  ECG/EMG Results (last 24 hours)     Procedure Component Value Units Date/Time    SCANNED - TELEMETRY   [[id number]] Resulted: 04/04/22     Updated: 04/06/22 1412    SCANNED - TELEMETRY   [[id number]] Resulted: 04/04/22     Updated: 04/06/22 1459    SCANNED - TELEMETRY   [[id number]] Resulted: 04/04/22     Updated: 04/06/22 1513    SCANNED - TELEMETRY   [[id number]] Resulted: 04/04/22     Updated: 04/06/22 1513    SCANNED - TELEMETRY   [[id number]] Resulted: 04/04/22     Updated: 04/06/22  1522    ECG 12 Lead [[id number]] Collected: 04/07/22 0651     Updated: 04/07/22 1211     QT Interval 412 ms      QTC Interval 460 ms     Narrative:      Test Reason : Post cath  Blood Pressure :   */*   mmHG  Vent. Rate :  75 BPM     Atrial Rate :  75 BPM     P-R Int : 164 ms          QRS Dur :  84 ms      QT Int : 412 ms       P-R-T Axes :  50  30  64 degrees     QTc Int : 460 ms    Normal sinus rhythm  Low voltage QRS  Possible Inferior infarct , age undetermined  late R wave transition  Abnormal ECG  When compared with ECG of 05-APR-2022 15:32,  late R wave transition  No significant change was found    Referred By:            Confirmed By: LAURIE CHAHAL          Results for orders placed during the hospital encounter of 02/05/22    Adult Transthoracic Echo Complete W/ Cont if Necessary Per Protocol    Interpretation Summary  · The study is technically difficult for diagnosis. The quality of the study is limited due to patient body habitus. Verbal consent was obtained from the patient to use Definity image enhancer in order to optimize the study.  · Left ventricular wall thickness is consistent with moderate concentric hypertrophy.  · Estimated left ventricular EF = 63% Left ventricular ejection fraction appears to be 61 - 65%. Left ventricular systolic function is normal.  · Left ventricular diastolic function is consistent with (grade Ia w/high LAP) impaired relaxation.  · Saline test results are negative.  · Estimated right ventricular systolic pressure from tricuspid regurgitation is normal (<35 mmHg).  · No definite LV mass or thrombus noted on the study      US Guided Vascular Access    Result Date: 4/5/2022  Impression: Please see above. Electronically signed by:  Perfecto Alfred MD  4/5/2022 9:41 AM CDT Workstation: [id number]    XR Chest 1 View    Result Date: 4/4/2022  1. Chronic changes as above without radiographic evidence of acute superimposed cardiopulmonary disease. Electronically signed by:  Linda  Bernard COREAS  4/4/2022 12:52 PM CDT Workstation: [id number]    CT Angiogram Coronary    Result Date: 4/5/2022  Abnormal CT coronary arteriogram. 1. There is a LIMA to LAD graft. The graft is very thin, very poor flow and only faintly visualized (nonfunctional) 2. There are stents in the LAD and D1 branch. The LAD and D1 branch distal to the stents are occluded. 3. Normal circumflex. 4. Calcified plaque proximal right coronary artery causing narrowing of 50-60%. Calcium score 933. (Known history of coronary artery disease, stents and bypass surgery.) Normal functional analysis. Computer-assisted calculation of left ventricular ejection fraction 67%. Electronically signed by:  Naveed Taylor MD  4/5/2022 1:51 PM CDT Workstation: [id number]      Medication Review:     Current Facility-Administered Medications:   •  acetaminophen (TYLENOL) tablet 650 mg, 650 mg, Oral, Q4H PRN, Sheryl Navas MD, 650 mg at 04/07/22 1130  •  ARIPiprazole (ABILIFY) tablet 15 mg, 15 mg, Oral, Daily, Sheryl Navas MD, 15 mg at 04/07/22 0714  •  aspirin EC tablet 325 mg, 325 mg, Oral, Daily, Sheryl Navas MD, 325 mg at 04/07/22 0714  •  atorvastatin (LIPITOR) tablet 80 mg, 80 mg, Oral, Daily, Sheryl Navas MD, 80 mg at 04/07/22 0714  •  sennosides-docusate (PERICOLACE) 8.6-50 MG per tablet 2 tablet, 2 tablet, Oral, BID, 2 tablet at 04/05/22 2114 **AND** polyethylene glycol (MIRALAX) packet 17 g, 17 g, Oral, Daily PRN **AND** bisacodyl (DULCOLAX) EC tablet 5 mg, 5 mg, Oral, Daily PRN **AND** bisacodyl (DULCOLAX) suppository 10 mg, 10 mg, Rectal, Daily PRN, Sheryl Navas MD  •  Calcium Carbonate-Vitamin D3 600-400 MG-UNIT tablet 1 tablet, 1 tablet, Oral, Daily, Sheryl Navas MD, 1 tablet at 04/07/22 0715  •  clopidogrel (PLAVIX) tablet 75 mg, 75 mg, Oral, Daily, Sheryl Navas MD, 75 mg at 04/07/22 0715  •  dextrose (D50W) (25 g/50 mL) IV injection 25 g, 25 g, Intravenous, Q15 Min PRN, Larry Lopez MD  •  dextrose (GLUTOSE) oral gel 15  g, 15 g, Oral, Q15 Min PRN, Larry Lopez MD  •  folic acid (FOLVITE) tablet 1,000 mcg, 1,000 mcg, Oral, Daily, Sheryl Navas MD, 1,000 mcg at 04/07/22 0716  •  gabapentin (NEURONTIN) capsule 400 mg, 400 mg, Oral, TID, Sheryl Navas MD, 400 mg at 04/07/22 0716  •  glucagon (human recombinant) (GLUCAGEN DIAGNOSTIC) injection 1 mg, 1 mg, Intramuscular, Q15 Min PRN, Larry Lopez MD  •  HYDROcodone-acetaminophen (NORCO)  MG per tablet 1 tablet, 1 tablet, Oral, Q4H PRN, Sheryl Navas MD, 1 tablet at 04/06/22 0942  •  HYDROcodone-acetaminophen (NORCO) 7.5-325 MG per tablet 1 tablet, 1 tablet, Oral, Q6H PRN, Sheryl Navas MD  •  insulin aspart (novoLOG) injection 0-9 Units, 0-9 Units, Subcutaneous, TID AC, Larry Lopez MD, 7 Units at 04/07/22 1131  •  insulin detemir (LEVEMIR) injection 15 Units, 15 Units, Subcutaneous, Nightly, Sheryl Navas MD, 15 Units at 04/06/22 2140  •  isosorbide mononitrate (IMDUR) 24 hr tablet 30 mg, 30 mg, Oral, Q24H, Sheryl Navas MD, 30 mg at 04/07/22 0716  •  lisinopril (PRINIVIL,ZESTRIL) tablet 20 mg, 20 mg, Oral, Daily, Sheryl Navsa MD, 20 mg at 04/07/22 0714  •  LORazepam (ATIVAN) tablet 0.5 mg, 0.5 mg, Oral, Q8H, Sheryl Navas MD, 0.5 mg at 04/07/22 1131  •  meclizine (ANTIVERT) tablet 25 mg, 25 mg, Oral, TID PRN, Sheryl Navas MD  •  melatonin tablet 5.25 mg, 5.25 mg, Oral, Nightly PRN, Sheryl Navas MD  •  methocarbamol (ROBAXIN) tablet 500 mg, 500 mg, Oral, BID PRN, Sheryl Navas MD, 500 mg at 04/07/22 0350  •  metoclopramide (REGLAN) tablet 10 mg, 10 mg, Oral, 4x Daily PRN, Sheryl Navas MD, 10 mg at 04/05/22 1526  •  metoprolol succinate XL (TOPROL-XL) 24 hr tablet 50 mg, 50 mg, Oral, Daily, Sheryl Navas MD, 50 mg at 04/07/22 0716  •  metoprolol tartrate (LOPRESSOR) injection 5 mg, 5 mg, Intravenous, Q5 Min PRN, Sheryl Navas MD, 5 mg at 04/05/22 0937  •  mirtazapine (REMERON) tablet 30 mg, 30 mg, Oral, Nightly, Sheryl Navas MD, 30 mg at  04/06/22 2139  •  nitroglycerin (NITROSTAT) SL tablet 0.4 mg, 0.4 mg, Sublingual, Q5 Min PRN, Sheryl Navas MD  •  ondansetron (ZOFRAN) tablet 8 mg, 8 mg, Oral, Q8H PRN, Sheryl Navas MD, 8 mg at 04/07/22 0328  •  pantoprazole (PROTONIX) EC tablet 20 mg, 20 mg, Oral, Daily, Sheryl Navas MD, 20 mg at 04/07/22 0715  •  ranolazine (RANEXA) 12 hr tablet 500 mg, 500 mg, Oral, Q12H, Evon Hernandez Trduy, APRN, 500 mg at 04/07/22 0935  •  sodium chloride 0.9 % flush 10 mL, 10 mL, Intravenous, PRN, Sheryl Navas MD  •  sodium chloride 0.9 % flush 10 mL, 10 mL, Intravenous, Q12H, Sheryl Navas MD, 10 mL at 04/06/22 2140  •  sodium chloride 0.9 % flush 10 mL, 10 mL, Intravenous, PRN, Sheryl Navas MD  •  sodium chloride 0.9 % infusion, 75 mL/hr, Intravenous, Continuous, Sheryl Navas MD, Last Rate: 75 mL/hr at 04/07/22 0938, 75 mL/hr at 04/07/22 0938  •  tamsulosin (FLOMAX) 24 hr capsule 0.4 mg, 0.4 mg, Oral, Daily, Sheryl Navas MD, 0.4 mg at 04/07/22 0715  •  venlafaxine XR (EFFEXOR-XR) 24 hr capsule 150 mg, 150 mg, Oral, BID, Sheryl Navas MD, 150 mg at 04/07/22 0715    Patient's recent labs and results as included in the subjective data were reviewed by me. Any pertinent outside data will be included.        Assessment/Plan       Coronary artery disease of native artery of native heart with stable angina pectoris (HCC)  - CCTA preformed ordered by the hospitalist. Patient has known CAD patient of Dr. Snowden. Last [facility] was 2020 with Dr. Cooper.   - C indicated to assess patency of stents that were mentioned to be occluded on radiology reading.   - No acute findings on LHC. She has small vessels and was recommended to be on Ranexa 500mg BID.   - she has a non-cardiac chest pain that should be worked up. She still has pressure and burning during my exam that is atypical of angina. This was her initial complaint. Likely GI.  - Please work up non-cardiac chest pain prior to discharge to reduce readmission.         Plan  for disposition:Where: per primary team. Will follow up with Cardiology as scheduled.             This document has been electronically signed by BEBE Abbott on April 7, 2022 12:17 CDT      Electronically signed by BEBE Abbott, 04/07/22, 12:17 PM CDT.    I personally saw and examined Ariana Martinez after the APRN.  I personally performed a history and physical examination of the patient.  I personally reviewed independent findings and plan of care.  I discussed management with the APRN.  I agree with the APRN's documentation.    Subjective: No acute issues at night.  Reported improvement.    Vitals:    04/07/22 0557 04/07/22 0714 04/07/22 0740 04/07/22 0807   BP:  143/64 149/70    BP Location:  Left arm Right arm    Patient Position:  Lying Lying    Pulse:  71 75 76   Resp:  16 16    Temp:  96.8 °F (36 °C) 97.5 °F (36.4 °C)    TempSrc:  Oral Temporal    SpO2:  95% 92%    Weight: 122 kg (269 lb 6.4 oz)      Height:         Right groin without evidence of hematoma or bruit.    Assessment plan:  1.  Coronary artery disease: Extensive history of coronary artery disease as above.  Has presented with recurrent chest pain despite being optimal medical therapy with aspirin/Plavix/Imdur/metoprolol     CT concerning for progression of disease.  Explained options including medical management versus invasive evaluation with the patient.  In setting of recurrent chest pain patient opted for invasive evaluation.  Cardiac cath was performed which showed patent stents in LAD/diagonal.  Chronic total occlusion of the LAD which is unchanged from before.  She does have a patent LIMA however it is attached to a very small caliber less than 2 mm apical LAD which has severe focal disease again this is unchanged from before and is not amenable to PCI.  RCA and left circumflex is mild nonobstructive disease.     Optimize antianginal therapy for apical LAD which is not amenable to PCI.  Continue aspirin/Plavix/Imdur and  metoprolol.  We will add Ranexa.     Patient had bump in her creatinine post CTA.  We will continue hydration today and monitor creatinine.         Electronically signed by Sheryl Navas MD, 04/07/22, 5:44 PM CDT.

## 2022-04-08 ENCOUNTER — TRANSITIONAL CARE MANAGEMENT TELEPHONE ENCOUNTER (OUTPATIENT)
Dept: CALL CENTER | Facility: HOSPITAL | Age: 62
End: 2022-04-08

## 2022-04-08 NOTE — OUTREACH NOTE
Call Center TCM Note    Flowsheet Row Responses   [facility] patient discharged from? [place]   Does the patient have one of the following disease processes/diagnoses(primary or secondary)? Other   TCM attempt successful? Yes   Call start time 1329   Call end time 1338   Discharge diagnosis Chest pain,   Left Heart Cath   Is patient permission given to speak with other caregiver? No   Person spoke with today (if not patient) and relationship patient   Meds reviewed with patient/caregiver? Yes  [New: famotidine, isosorbide mononitrate, ranolazine. Change to pantoprazole dose]   Does the patient have all medications ordered at discharge? Yes   Is the patient taking all medications as directed (includes completed medication regime)? Yes   Comments regarding appointments Cardiology appt 7/5/22, endocrinology 5/23/22   Does the patient have a primary care provider?  Yes   Does the patient have an appointment with their PCP within 7 days of discharge? No   Comments regarding PCP PCP Kimberly SPENCE. Patient requested HFU appt cancellation. She wishes to keep the May appt already scheduled as she and  come the same day.    What is preventing the patient from scheduling follow up appointments within 7 days of discharge? --  [Patient declines to see PCP within 2 weeks of discharge. ]   Has the patient kept scheduled appointments due by today? N/A   Has home health visited the patient within 72 hours of discharge? N/A   Psychosocial issues? No   Comments Patient reports that she checks blood sugar 4 times and day and current strips order is 3 times a day. Patient needs PCP to order a 3 month supply for 4 times a day.    Did the patient receive a copy of their discharge instructions? Yes   Nursing interventions Reviewed instructions with patient   What is the patient's perception of their health status since discharge? Improving   Is the patient/caregiver able to teach back signs and symptoms related  to disease process for when to call PCP? Yes   Is the patient/caregiver able to teach back the hierarchy of who to call/visit for symptoms/problems? PCP, Specialist, Home health nurse, Urgent Care, ED, 911 Yes   If the patient is a current smoker, are they able to teach back resources for cessation? Not a smoker   TCM call completed? Yes          Елена Lagunas RN    4/8/2022, 13:38 CDT

## 2022-04-13 DIAGNOSIS — E53.8 CYANOCOBALAMIN DEFICIENCY: ICD-10-CM

## 2022-04-13 RX ORDER — CYANOCOBALAMIN 1000 UG/ML
1000 INJECTION, SOLUTION INTRAMUSCULAR; SUBCUTANEOUS
Qty: 3 ML | Refills: 2 | Status: SHIPPED | OUTPATIENT
Start: 2022-04-13 | End: 2022-10-28 | Stop reason: SDUPTHER

## 2022-04-14 ENCOUNTER — TELEPHONE (OUTPATIENT)
Dept: FAMILY MEDICINE CLINIC | Facility: CLINIC | Age: 62
End: 2022-04-14

## 2022-04-14 RX ORDER — BLOOD SUGAR DIAGNOSTIC
1 STRIP MISCELLANEOUS SEE ADMIN INSTRUCTIONS
Qty: 100 EACH | Refills: 3 | Status: SHIPPED | OUTPATIENT
Start: 2022-04-14 | End: 2022-05-02 | Stop reason: SDUPTHER

## 2022-04-14 NOTE — TELEPHONE ENCOUNTER
Incoming Refill Request      Medication requested (name and dose): Accu-Chek Guide test strip    Pharmacy where request should be sent:   [facility] [place]      Additional details provided by patient: she wanted more test strips. She said she check 4 times a day. 100 test strips was not enough.    Best call back number: [phone]    Does the patient have less than a 3 day supply:  [] Yes  [] No    Chandana Bertrand Rep  04/14/22, 13:46 CDT

## 2022-04-15 DIAGNOSIS — F41.1 GENERALIZED ANXIETY DISORDER: Chronic | ICD-10-CM

## 2022-04-18 ENCOUNTER — TELEPHONE (OUTPATIENT)
Dept: FAMILY MEDICINE CLINIC | Facility: CLINIC | Age: 62
End: 2022-04-18

## 2022-04-18 ENCOUNTER — READMISSION MANAGEMENT (OUTPATIENT)
Dept: CALL CENTER | Facility: HOSPITAL | Age: 62
End: 2022-04-18

## 2022-04-18 DIAGNOSIS — G89.29 CHRONIC RIGHT-SIDED LOW BACK PAIN WITH RIGHT-SIDED SCIATICA: ICD-10-CM

## 2022-04-18 DIAGNOSIS — G54.6 PHANTOM PAIN AFTER AMPUTATION OF LOWER EXTREMITY: Chronic | ICD-10-CM

## 2022-04-18 DIAGNOSIS — M54.41 CHRONIC RIGHT-SIDED LOW BACK PAIN WITH RIGHT-SIDED SCIATICA: ICD-10-CM

## 2022-04-18 RX ORDER — LORAZEPAM 0.5 MG/1
TABLET ORAL
Qty: 90 TABLET | Refills: 0 | Status: SHIPPED | OUTPATIENT
Start: 2022-04-18 | End: 2022-05-24 | Stop reason: SDUPTHER

## 2022-04-18 RX ORDER — HYDROCODONE BITARTRATE AND ACETAMINOPHEN 7.5; 325 MG/1; MG/1
1 TABLET ORAL EVERY 6 HOURS PRN
Qty: 120 TABLET | Refills: 0 | Status: SHIPPED | OUTPATIENT
Start: 2022-04-18 | End: 2022-05-16 | Stop reason: SDUPTHER

## 2022-04-18 NOTE — TELEPHONE ENCOUNTER
Patient has chronic anxiety requiring benzodiazepine use concurrently with chronic pain requiring opiate pain medication and/ or gabapentin. Patient has been educated regarding potential dangers of oversedation and accidental overdose with use of both medications concurrently. Serial assessments of patient has yielded no sign of oversedation or adverse effects of patient, and he/she is advised and aware to notify my office immediately if symptoms do occur, as well as to discontinue use of benzodiazepine medication and opiate medication immediately if that occurs, pending medical evaluation and advice. Patient has been compliant with use of medications, UDS, visits with no adverse effects noted. Patient understands the risks associated with this controlled medication, including tolerance and addiction.  Patient also agrees to only obtain this medication from me, and not from a another provider, unless that provider is covering for me in my absence.  Patient also agrees to be compliant in dosing, and not self adjust the dose of medication.  A signed controlled substance agreement is on file, and the patient has received a controlled substance education sheet at this a previous visit.  The patient has also signed a consent for treatment with a controlled substance as per [facility] policy. LESTER was obtained. Refills were sent for:   Lorazepam and hydrocodone.     This document has been electronically signed by BEBE Palomino on April 18, 2022 13:12 CDT

## 2022-04-18 NOTE — OUTREACH NOTE
Medical Week 2 Survey    Flowsheet Row Responses   [facility] patient discharged from? [place]   Does the patient have one of the following disease processes/diagnoses(primary or secondary)? Other   Week 2 attempt successful? Yes   Call start time 1201   Discharge diagnosis Chest pain,   Left Heart Cath   Call end time 1202   Meds reviewed with patient/caregiver? Yes   Is the patient taking all medications as directed (includes completed medication regime)? Yes   Has the patient kept scheduled appointments due by today? N/A   What is the patient's perception of their health status since discharge? Returned to baseline/stable   Week 2 Call Completed? Yes   Graduated Yes   Is the patient interested in additional calls from an ambulatory ?  NOTE:  applies to high risk patients requiring additional follow-up. No   Did the patient feel the follow up calls were helpful during their recovery period? Yes   Was the number of calls appropriate? Yes   Graduated/Revoked comments Returned to baseline.  Denies needs.          ZAID CASTANEDA - Registered Nurse

## 2022-04-18 NOTE — TELEPHONE ENCOUNTER
Incoming Refill Request      Medication requested (name and dose):   HYDROcodone-acetaminophen (NORCO) 7.5-325 MG per tablet         Pharmacy where request should be sent:  [facility]     Additional details provided by patient:     Best call back number:     Does the patient have less than a 3 day supply:  [] Yes  [] No    Tiffanie Vaughan  04/18/22, 08:16 CDT

## 2022-04-21 RX ORDER — INSULIN ASPART 100 [IU]/ML
INJECTION, SOLUTION INTRAVENOUS; SUBCUTANEOUS
Qty: 5 PEN | Refills: 5 | Status: SHIPPED | OUTPATIENT
Start: 2022-04-21 | End: 2022-10-03 | Stop reason: SDUPTHER

## 2022-04-22 LAB
QT INTERVAL: 362 MS
QTC INTERVAL: 450 MS

## 2022-04-25 ENCOUNTER — OFFICE VISIT (OUTPATIENT)
Dept: ONCOLOGY | Facility: CLINIC | Age: 62
End: 2022-04-25

## 2022-04-25 ENCOUNTER — LAB (OUTPATIENT)
Dept: ONCOLOGY | Facility: HOSPITAL | Age: 62
End: 2022-04-25

## 2022-04-25 VITALS
SYSTOLIC BLOOD PRESSURE: 106 MMHG | RESPIRATION RATE: 18 BRPM | DIASTOLIC BLOOD PRESSURE: 55 MMHG | HEART RATE: 79 BPM | OXYGEN SATURATION: 100 % | WEIGHT: 265 LBS | BODY MASS INDEX: 40.29 KG/M2 | TEMPERATURE: 96 F

## 2022-04-25 DIAGNOSIS — D72.828 OTHER ELEVATED WHITE BLOOD CELL (WBC) COUNT: ICD-10-CM

## 2022-04-25 DIAGNOSIS — M54.41 CHRONIC RIGHT-SIDED LOW BACK PAIN WITH RIGHT-SIDED SCIATICA: Primary | ICD-10-CM

## 2022-04-25 DIAGNOSIS — G89.29 CHRONIC RIGHT-SIDED LOW BACK PAIN WITH RIGHT-SIDED SCIATICA: Primary | ICD-10-CM

## 2022-04-25 DIAGNOSIS — E78.2 MIXED HYPERLIPIDEMIA: Chronic | ICD-10-CM

## 2022-04-25 DIAGNOSIS — I10 ESSENTIAL HYPERTENSION: Chronic | ICD-10-CM

## 2022-04-25 LAB
BASOPHILS # BLD AUTO: 0.05 10*3/MM3 (ref 0–0.2)
BASOPHILS NFR BLD AUTO: 0.5 % (ref 0–1.5)
DEPRECATED RDW RBC AUTO: 46.5 FL (ref 37–54)
EOSINOPHIL # BLD AUTO: 0.4 10*3/MM3 (ref 0–0.4)
EOSINOPHIL NFR BLD AUTO: 4.2 % (ref 0.3–6.2)
ERYTHROCYTE [DISTWIDTH] IN BLOOD BY AUTOMATED COUNT: 14.2 % (ref 12.3–15.4)
FERRITIN SERPL-MCNC: 156 NG/ML (ref 13–150)
FOLATE SERPL-MCNC: >20 NG/ML (ref 4.78–24.2)
HCT VFR BLD AUTO: 33.9 % (ref 34–46.6)
HGB BLD-MCNC: 11.4 G/DL (ref 12–15.9)
IMM GRANULOCYTES # BLD AUTO: 0.06 10*3/MM3 (ref 0–0.05)
IMM GRANULOCYTES NFR BLD AUTO: 0.6 % (ref 0–0.5)
IRON 24H UR-MRATE: 47 MCG/DL (ref 37–145)
IRON SATN MFR SERPL: 15 % (ref 20–50)
LYMPHOCYTES # BLD AUTO: 1.96 10*3/MM3 (ref 0.7–3.1)
LYMPHOCYTES NFR BLD AUTO: 20.4 % (ref 19.6–45.3)
MCH RBC QN AUTO: 30.5 PG (ref 26.6–33)
MCHC RBC AUTO-ENTMCNC: 33.6 G/DL (ref 31.5–35.7)
MCV RBC AUTO: 90.6 FL (ref 79–97)
MONOCYTES # BLD AUTO: 0.67 10*3/MM3 (ref 0.1–0.9)
MONOCYTES NFR BLD AUTO: 7 % (ref 5–12)
NEUTROPHILS NFR BLD AUTO: 6.47 10*3/MM3 (ref 1.7–7)
NEUTROPHILS NFR BLD AUTO: 67.3 % (ref 42.7–76)
NRBC BLD AUTO-RTO: 0 /100 WBC (ref 0–0.2)
PLATELET # BLD AUTO: 434 10*3/MM3 (ref 140–450)
PMV BLD AUTO: 9.9 FL (ref 6–12)
RBC # BLD AUTO: 3.74 10*6/MM3 (ref 3.77–5.28)
TIBC SERPL-MCNC: 311 MCG/DL (ref 298–536)
TRANSFERRIN SERPL-MCNC: 209 MG/DL (ref 200–360)
VIT B12 BLD-MCNC: 693 PG/ML (ref 211–946)
WBC NRBC COR # BLD: 9.61 10*3/MM3 (ref 3.4–10.8)

## 2022-04-25 PROCEDURE — 83540 ASSAY OF IRON: CPT

## 2022-04-25 PROCEDURE — 82728 ASSAY OF FERRITIN: CPT

## 2022-04-25 PROCEDURE — 84466 ASSAY OF TRANSFERRIN: CPT

## 2022-04-25 PROCEDURE — G0463 HOSPITAL OUTPT CLINIC VISIT: HCPCS | Performed by: NURSE PRACTITIONER

## 2022-04-25 PROCEDURE — 82607 VITAMIN B-12: CPT

## 2022-04-25 PROCEDURE — 82746 ASSAY OF FOLIC ACID SERUM: CPT

## 2022-04-25 PROCEDURE — 99214 OFFICE O/P EST MOD 30 MIN: CPT | Performed by: NURSE PRACTITIONER

## 2022-04-25 PROCEDURE — 85025 COMPLETE CBC W/AUTO DIFF WBC: CPT

## 2022-04-26 DIAGNOSIS — I25.118 CORONARY ARTERY DISEASE OF NATIVE ARTERY OF NATIVE HEART WITH STABLE ANGINA PECTORIS: ICD-10-CM

## 2022-04-26 RX ORDER — ATORVASTATIN CALCIUM 80 MG/1
TABLET, FILM COATED ORAL
Qty: 90 TABLET | Refills: 1 | Status: SHIPPED | OUTPATIENT
Start: 2022-04-26 | End: 2022-08-24

## 2022-04-26 RX ORDER — METOPROLOL SUCCINATE 50 MG/1
50 TABLET, EXTENDED RELEASE ORAL DAILY
Qty: 90 TABLET | Refills: 1 | Status: SHIPPED | OUTPATIENT
Start: 2022-04-26 | End: 2022-12-27 | Stop reason: SDUPTHER

## 2022-04-26 RX ORDER — NITROGLYCERIN 0.4 MG/1
0.4 TABLET SUBLINGUAL
Qty: 20 TABLET | Refills: 11 | Status: SHIPPED | OUTPATIENT
Start: 2022-04-26

## 2022-04-26 NOTE — TELEPHONE ENCOUNTER
Incoming Refill Request      Medication requested (name and dose):   nitroglycerin (NITROSTAT) 0.4 MG SL tablet        Pharmacy where request should be sent:   [facility]     Additional details provided by patient:     Best call back number:     Does the patient have less than a 3 day supply:  [] Yes  [] No    Tiffanie Vaughan  04/26/22, 15:24 CDT

## 2022-04-27 ENCOUNTER — TELEPHONE (OUTPATIENT)
Dept: ONCOLOGY | Facility: CLINIC | Age: 62
End: 2022-04-27

## 2022-04-27 NOTE — TELEPHONE ENCOUNTER
Contacted pt and advised per Teressa. PT verbalizes understanding and denies any further questions.

## 2022-05-02 RX ORDER — BLOOD SUGAR DIAGNOSTIC
1 STRIP MISCELLANEOUS 4 TIMES DAILY
Qty: 600 EACH | Refills: 1 | Status: SHIPPED | OUTPATIENT
Start: 2022-05-02 | End: 2023-02-28 | Stop reason: SDUPTHER

## 2022-05-02 NOTE — TELEPHONE ENCOUNTER
Incoming Refill Request      Medication requested (name and dose):     glucose blood (Accu-Chek Guide) test strip      Pharmacy where request should be sent:   [facility]     Additional details provided by patient:   SHE TEST 4X DAILY ONLY SENT IN ENOUGH FOR 1 TIME DAILY FOR 3 MONTHS     Best call back number:     Does the patient have less than a 3 day supply:  [] Yes  [] No    Tiffanie Vaughan  05/02/22, 08:17 CDT

## 2022-05-05 RX ORDER — INSULIN GLARGINE 100 [IU]/ML
INJECTION, SOLUTION SUBCUTANEOUS
Qty: 1 PEN | Refills: 7 | Status: SHIPPED | OUTPATIENT
Start: 2022-05-05 | End: 2022-10-24

## 2022-05-06 NOTE — PROGRESS NOTES
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  Due to iron deficiency and inability to tolerate iron by mouth, patient received 2 dose of intravenous Feraheme on October 23, 2019 and October 30, 2019.      Recently seen in May and iron stores were dropping again; pt given 2 doses of IV iron 6/4/2020 and 6/12/2020; upper and lower endoscopies  Recently seen in May and iron stores were dropping again and patient was given 2 additional doses of IV iron on 6/4/2020 and 6/12/2020. She also had upper and lower endoscopies with no identifiable source of bleeding.  She continues to take folic acid daily and monthly B-12 injections with her PCP       Past Medical History, Past Surgical History, Social History, Family History have been reviewed and are without significant changes except as mentioned.    Review of Systems   A comprehensive 14 point review of systems was performed and was negative except as mentioned.    Medications:  The current medication list was reviewed in the EMR    ALLERGIES:    Allergies   Allergen Reactions   • Seroquel [Quetiapine] Anaphylaxis   • Avandia [Rosiglitazone] Swelling   • Morphine And Related Hallucinations   • Oxycodone Hallucinations       Objective      Vitals:    04/25/22 1259   BP: 106/55   Pulse: 79   Resp: 18   Temp: 96 °F (35.6 °C)   SpO2: 100%   Weight: 120 kg (265 lb)   PainSc: 0-No pain     Current Status 4/22/2021   ECOG score 4       Physical Exam  General: alert and oriented no acute distress  Lungs: CTAB  Card: RRR  Ext no  edema    RECENT LABS:  Glucose   Date Value Ref Range Status   04/07/2022 322 (H) 65 - 99 mg/dL Final     Glucose, Arterial   Date Value Ref Range Status   10/14/2017 155 mmol/L Final     Sodium   Date Value Ref Range Status   04/07/2022 137 136 - 145 mmol/L Final     Potassium   Date Value Ref Range Status   04/07/2022 5.2 3.5 - 5.2 mmol/L Final   03/18/2021 3.6 3.5 - 5.4 MMOL/L Final     CO2   Date Value Ref Range Status   04/07/2022 26.0 22.0 - 29.0 mmol/L Final     Chloride   Date Value Ref Range Status   04/07/2022 103 98 - 107 mmol/L Final     Anion Gap   Date Value Ref Range Status   04/07/2022 8.0 5.0 - 15.0 mmol/L Final     Creatinine   Date Value Ref Range Status   04/07/2022 1.28 (H) 0.57 - 1.00 mg/dL Final     BUN   Date Value Ref Range Status   04/07/2022 20 8 - 23 mg/dL Final     BUN/Creatinine Ratio   Date Value Ref Range Status   04/07/2022 15.6 7.0 - 25.0 Final     Calcium   Date Value Ref Range Status   04/07/2022 9.2 8.6 - 10.5 mg/dL Final     eGFR Non  Amer   Date Value Ref Range Status   02/07/2022 45 (L) >60 mL/min/1.73 Final     Alkaline Phosphatase   Date Value Ref Range Status   04/04/2022 131 (H) 39 - 117 U/L Final     Total Protein   Date Value Ref Range Status   04/04/2022 6.8 6.0 - 8.5 g/dL Final     ALT (SGPT)   Date Value Ref Range Status   04/04/2022 15 1 - 33 U/L Final     AST (SGOT)   Date Value Ref Range Status   04/04/2022 12 1 - 32 U/L Final     Total Bilirubin   Date Value Ref Range Status   04/04/2022 0.4 0.0 - 1.2 mg/dL Final     Albumin   Date Value Ref Range Status   04/04/2022 3.70 3.50 - 5.20 g/dL Final     Globulin   Date Value Ref Range Status   04/04/2022 3.1 gm/dL Final     Lab Results   Component Value Date    WBC 9.61 04/25/2022    HGB 11.4 (L) 04/25/2022    HCT 33.9 (L) 04/25/2022    MCV 90.6 04/25/2022     04/25/2022     Lab Results   Component Value Date    NEUTROABS 6.47 04/25/2022    IRON 47 04/25/2022    IRON 75 11/30/2021    IRON 64 07/29/2021     TIBC 311 04/25/2022    TIBC 340 11/30/2021    TIBC 289 (L) 07/29/2021    LABIRON 15 (L) 04/25/2022    LABIRON 22 11/30/2021    LABIRON 22 07/29/2021    FERRITIN 156.00 (H) 04/25/2022    FERRITIN 212.00 (H) 11/30/2021    FERRITIN 233.30 (H) 07/29/2021    [id number] 693 04/25/2022    AVATVOOE43 >2,000 (H) 11/30/2021    [id number] 579 08/11/2021    FOLATE >20.00 04/25/2022    FOLATE >20.00 02/28/2022    FOLATE >20.00 11/30/2021     Lab Results   Component Value Date    REFLABREPO SEE ATTACHED REPORT 09/30/2019    REFLABREPO SEE ATTACHED REPORT 09/30/2019         RADIOLOGY DATA :  No radiology results for the last 7 days        Assessment/Plan       1.  Thrombocytosis:  - Patient has intermittent thrombocytosis since 2015.  -lab reviewed today and platelet count is 434,000.  -Extensive work-up including Tony 2 mutation with exon 12, CALR mutation, MPL mutation done on September 30, 2019 is negative.  - At this point will continue with clinical monitoring.  Will ask patient to return to clinic in 4 months with repeat CBC and anemia work-up to be done     2.  Leukocytosis:  - Patient has persistent leukocytosis since 2015.  -White blood cell count is improved to 9,000.  CALR mutation, MPL mutation, Tony 2 mutation including exon 12 were negative.  At this point will continue with clinical monitoring.  - If patient has any worsening leukocytosis or thrombocytosis in future, she will need bone marrow biopsy which was discussed with patient.     3.  Iron deficiency:  -Patient is found to have iron deficiency with iron saturation of only 11% and ferritin of 46.  Patient states in the past she is not able to tolerate iron by mouth.  -Patient has received multiple doses of IV iron; most recently in June 2020 with dropping iron stores.  -Hgb today is 11.4  with stable iron stores; continue to monitor.  -continue with  monthly B12 injection by primary medical provider.  -will recheck labs again in 4 mos                PHQ-9 Total  Score: 0     Ariana Martinez reports a pain score of 0.  Given her pain assessment as noted, treatment options were discussed and the following options were decided upon as a follow-up plan to address the patient's pain: no pain today.         Teressa Hammond, BEBE  5/6/2022  10:17 CDT        Part of this note may be an electronic transcription/translation of spoken language to printed text using the Dragon Dictation System.          CC:

## 2022-05-16 ENCOUNTER — TELEPHONE (OUTPATIENT)
Dept: FAMILY MEDICINE CLINIC | Facility: CLINIC | Age: 62
End: 2022-05-16

## 2022-05-16 DIAGNOSIS — G54.6 PHANTOM PAIN AFTER AMPUTATION OF LOWER EXTREMITY: Chronic | ICD-10-CM

## 2022-05-16 DIAGNOSIS — M54.41 CHRONIC RIGHT-SIDED LOW BACK PAIN WITH RIGHT-SIDED SCIATICA: ICD-10-CM

## 2022-05-16 DIAGNOSIS — G89.29 CHRONIC RIGHT-SIDED LOW BACK PAIN WITH RIGHT-SIDED SCIATICA: ICD-10-CM

## 2022-05-16 RX ORDER — HYDROCODONE BITARTRATE AND ACETAMINOPHEN 7.5; 325 MG/1; MG/1
1 TABLET ORAL EVERY 6 HOURS PRN
Qty: 120 TABLET | Refills: 0 | Status: SHIPPED | OUTPATIENT
Start: 2022-05-16 | End: 2022-06-17 | Stop reason: SDUPTHER

## 2022-05-16 NOTE — TELEPHONE ENCOUNTER
Incoming Refill Request      Medication requested (name and dose):     HYDROcodone-acetaminophen (NORCO) 7.5-325 MG per tablet     LORazepam (ATIVAN) 0.5 MG tablet      Pharmacy where request should be sent:   [facility]     Additional details provided by patient:     Best call back number:     Does the patient have less than a 3 day supply:  [] Yes  [] No    Tiffanie Vaughan  05/16/22, 08:19 CDT

## 2022-05-16 NOTE — TELEPHONE ENCOUNTER
Patient seen in office every 3 months for chronic pain requiring opiate pain medication. Compliant with medication, visits with no adverse effects noted. LESTER and UDS current and appropriate. Patient called requesting scheduled refill at appropriate interval. Patient understands the risks associated with this controlled medication, including tolerance and addiction.  Patient also agrees to only obtain this medication from me, and not from a another provider, unless that provider is covering for me in my absence.  Patient also agrees to be compliant in dosing, and not self adjust the dose of medication.  A signed controlled substance agreement is on file, and the patient has received a controlled substance education sheet at this a previous visit.  The patient has also signed a consent for treatment with a controlled substance as per [facility] policy. LESTER was obtained.   Refill sent for hydrocodone.     This document has been electronically signed by BEBE Palomino on May 16, 2022 09:02 CDT

## 2022-05-20 DIAGNOSIS — G47.00 INSOMNIA, UNSPECIFIED TYPE: ICD-10-CM

## 2022-05-20 RX ORDER — MIRTAZAPINE 30 MG/1
TABLET, FILM COATED ORAL
Qty: 90 TABLET | Refills: 1 | Status: SHIPPED | OUTPATIENT
Start: 2022-05-20 | End: 2022-06-17 | Stop reason: HOSPADM

## 2022-05-20 RX ORDER — HYDROCHLOROTHIAZIDE 25 MG/1
TABLET ORAL
Qty: 90 TABLET | Refills: 0 | Status: SHIPPED | OUTPATIENT
Start: 2022-05-20 | End: 2022-06-17 | Stop reason: HOSPADM

## 2022-05-22 DIAGNOSIS — I25.118 CORONARY ARTERY DISEASE OF NATIVE ARTERY OF NATIVE HEART WITH STABLE ANGINA PECTORIS: ICD-10-CM

## 2022-05-23 RX ORDER — NITROGLYCERIN 0.4 MG/1
TABLET SUBLINGUAL
Qty: 25 TABLET | Refills: 11 | OUTPATIENT
Start: 2022-05-23

## 2022-05-24 ENCOUNTER — LAB (OUTPATIENT)
Dept: LAB | Facility: OTHER | Age: 62
End: 2022-05-24

## 2022-05-24 ENCOUNTER — OFFICE VISIT (OUTPATIENT)
Dept: FAMILY MEDICINE CLINIC | Facility: CLINIC | Age: 62
End: 2022-05-24

## 2022-05-24 VITALS
WEIGHT: 265 LBS | HEART RATE: 109 BPM | RESPIRATION RATE: 16 BRPM | DIASTOLIC BLOOD PRESSURE: 80 MMHG | SYSTOLIC BLOOD PRESSURE: 150 MMHG | BODY MASS INDEX: 40.16 KG/M2 | HEIGHT: 68 IN | TEMPERATURE: 96.4 F | OXYGEN SATURATION: 98 %

## 2022-05-24 DIAGNOSIS — G89.29 CHRONIC RIGHT-SIDED LOW BACK PAIN WITH RIGHT-SIDED SCIATICA: ICD-10-CM

## 2022-05-24 DIAGNOSIS — E55.9 VITAMIN D DEFICIENCY: ICD-10-CM

## 2022-05-24 DIAGNOSIS — G89.29 CHRONIC RIGHT-SIDED LOW BACK PAIN WITH RIGHT-SIDED SCIATICA: Chronic | ICD-10-CM

## 2022-05-24 DIAGNOSIS — I10 HYPERTENSION, UNSPECIFIED TYPE: ICD-10-CM

## 2022-05-24 DIAGNOSIS — R22.41 MASS OF SKIN OF RIGHT LOWER LEG: Primary | ICD-10-CM

## 2022-05-24 DIAGNOSIS — F41.1 GENERALIZED ANXIETY DISORDER: Chronic | ICD-10-CM

## 2022-05-24 DIAGNOSIS — E53.8 CYANOCOBALAMIN DEFICIENCY: ICD-10-CM

## 2022-05-24 DIAGNOSIS — E11.65 TYPE 2 DIABETES MELLITUS WITH HYPERGLYCEMIA, WITH LONG-TERM CURRENT USE OF INSULIN: ICD-10-CM

## 2022-05-24 DIAGNOSIS — D72.829 LEUKOCYTOSIS, UNSPECIFIED TYPE: ICD-10-CM

## 2022-05-24 DIAGNOSIS — E78.2 MIXED HYPERLIPIDEMIA: ICD-10-CM

## 2022-05-24 DIAGNOSIS — R23.4 THICKENING, SKIN: ICD-10-CM

## 2022-05-24 DIAGNOSIS — Z51.81 ENCOUNTER FOR THERAPEUTIC DRUG LEVEL MONITORING: ICD-10-CM

## 2022-05-24 DIAGNOSIS — M54.41 CHRONIC RIGHT-SIDED LOW BACK PAIN WITH RIGHT-SIDED SCIATICA: ICD-10-CM

## 2022-05-24 DIAGNOSIS — G54.6 PHANTOM PAIN AFTER AMPUTATION OF LOWER EXTREMITY: Chronic | ICD-10-CM

## 2022-05-24 DIAGNOSIS — M54.41 CHRONIC RIGHT-SIDED LOW BACK PAIN WITH RIGHT-SIDED SCIATICA: Chronic | ICD-10-CM

## 2022-05-24 DIAGNOSIS — Z79.4 TYPE 2 DIABETES MELLITUS WITH HYPERGLYCEMIA, WITH LONG-TERM CURRENT USE OF INSULIN: ICD-10-CM

## 2022-05-24 DIAGNOSIS — E53.8 FOLATE DEFICIENCY: Chronic | ICD-10-CM

## 2022-05-24 DIAGNOSIS — R22.41 SUBCUTANEOUS MASS OF RIGHT LOWER LEG: ICD-10-CM

## 2022-05-24 DIAGNOSIS — J30.9 CHRONIC ALLERGIC RHINITIS: ICD-10-CM

## 2022-05-24 PROBLEM — I25.118 CORONARY ARTERY DISEASE OF NATIVE ARTERY OF NATIVE HEART WITH STABLE ANGINA PECTORIS: Chronic | Status: ACTIVE | Noted: 2018-09-11

## 2022-05-24 PROBLEM — E66.01 CLASS 3 SEVERE OBESITY DUE TO EXCESS CALORIES WITHOUT SERIOUS COMORBIDITY WITH BODY MASS INDEX (BMI) OF 40.0 TO 44.9 IN ADULT: Chronic | Status: ACTIVE | Noted: 2017-08-08

## 2022-05-24 PROBLEM — I25.9 CHEST PAIN DUE TO MYOCARDIAL ISCHEMIA: Chronic | Status: ACTIVE | Noted: 2020-02-16

## 2022-05-24 PROBLEM — D50.0 IRON DEFICIENCY ANEMIA DUE TO CHRONIC BLOOD LOSS: Chronic | Status: ACTIVE | Noted: 2020-06-09

## 2022-05-24 PROBLEM — Q24.5 CORONARY ARTERY ABNORMALITY: Chronic | Status: ACTIVE | Noted: 2022-04-05

## 2022-05-24 PROBLEM — T45.4X5A ADVERSE EFFECT OF IRON: Chronic | Status: ACTIVE | Noted: 2019-10-14

## 2022-05-24 PROBLEM — M15.9 PRIMARY OSTEOARTHRITIS INVOLVING MULTIPLE JOINTS: Chronic | Status: ACTIVE | Noted: 2019-02-06

## 2022-05-24 PROBLEM — J30.2 SEASONAL ALLERGIC RHINITIS: Chronic | Status: ACTIVE | Noted: 2017-04-12

## 2022-05-24 PROBLEM — G45.9 TIA (TRANSIENT ISCHEMIC ATTACK): Chronic | Status: ACTIVE | Noted: 2022-02-05

## 2022-05-24 PROBLEM — I36.1 NONRHEUMATIC TRICUSPID VALVE REGURGITATION: Chronic | Status: ACTIVE | Noted: 2020-02-26

## 2022-05-24 PROBLEM — Z95.1 S/P CABG (CORONARY ARTERY BYPASS GRAFT): Chronic | Status: ACTIVE | Noted: 2021-06-30

## 2022-05-24 PROBLEM — R07.9 CHEST PAIN: Chronic | Status: ACTIVE | Noted: 2019-07-12

## 2022-05-24 PROBLEM — M19.079 ANKLE ARTHRITIS: Chronic | Status: ACTIVE | Noted: 2021-02-16

## 2022-05-24 PROBLEM — I27.20 PULMONARY HYPERTENSION: Chronic | Status: ACTIVE | Noted: 2018-12-17

## 2022-05-24 PROBLEM — Q66.70 PES CAVUS: Chronic | Status: ACTIVE | Noted: 2019-01-04

## 2022-05-24 PROBLEM — M21.172: Chronic | Status: ACTIVE | Noted: 2019-10-16

## 2022-05-24 PROBLEM — J98.4 RESTRICTIVE LUNG DISEASE SECONDARY TO OBESITY: Chronic | Status: ACTIVE | Noted: 2017-05-01

## 2022-05-24 PROBLEM — E66.9 RESTRICTIVE LUNG DISEASE SECONDARY TO OBESITY: Chronic | Status: ACTIVE | Noted: 2017-05-01

## 2022-05-24 PROBLEM — R53.83 MALAISE AND FATIGUE: Chronic | Status: ACTIVE | Noted: 2020-06-09

## 2022-05-24 PROBLEM — R53.81 MALAISE AND FATIGUE: Chronic | Status: ACTIVE | Noted: 2020-06-09

## 2022-05-24 PROBLEM — K31.84 GASTROPARESIS: Chronic | Status: ACTIVE | Noted: 2018-09-18

## 2022-05-24 PROBLEM — I50.30 (HFPEF) HEART FAILURE WITH PRESERVED EJECTION FRACTION: Chronic | Status: ACTIVE | Noted: 2017-08-27

## 2022-05-24 PROBLEM — I65.23 OCCLUSION AND STENOSIS OF BILATERAL CAROTID ARTERIES: Chronic | Status: ACTIVE | Noted: 2021-06-18

## 2022-05-24 PROBLEM — E61.1 IRON DEFICIENCY: Chronic | Status: ACTIVE | Noted: 2019-10-14

## 2022-05-24 PROBLEM — Z89.512 S/P BKA (BELOW KNEE AMPUTATION) UNILATERAL, LEFT: Chronic | Status: ACTIVE | Noted: 2021-06-18

## 2022-05-24 LAB
ALBUMIN SERPL-MCNC: 4.3 G/DL (ref 3.5–5)
ALBUMIN/GLOB SERPL: 1.4 G/DL (ref 1.1–1.8)
ALP SERPL-CCNC: 149 U/L (ref 38–126)
ALT SERPL W P-5'-P-CCNC: 26 U/L
ANION GAP SERPL CALCULATED.3IONS-SCNC: 11 MMOL/L (ref 5–15)
AST SERPL-CCNC: 24 U/L (ref 14–36)
BASOPHILS # BLD AUTO: 0.06 10*3/MM3 (ref 0–0.2)
BASOPHILS NFR BLD AUTO: 0.6 % (ref 0–1.5)
BILIRUB SERPL-MCNC: 0.6 MG/DL (ref 0.2–1.3)
BUN SERPL-MCNC: 23 MG/DL (ref 7–23)
BUN/CREAT SERPL: 17.3 (ref 7–25)
CALCIUM SPEC-SCNC: 9.5 MG/DL (ref 8.4–10.2)
CHLORIDE SERPL-SCNC: 101 MMOL/L (ref 101–112)
CHOLEST SERPL-MCNC: 172 MG/DL (ref 150–200)
CO2 SERPL-SCNC: 25 MMOL/L (ref 22–30)
CREAT SERPL-MCNC: 1.33 MG/DL (ref 0.52–1.04)
DEPRECATED RDW RBC AUTO: 43.6 FL (ref 37–54)
EGFRCR SERPLBLD CKD-EPI 2021: 45.9 ML/MIN/1.73
EOSINOPHIL # BLD AUTO: 0.33 10*3/MM3 (ref 0–0.4)
EOSINOPHIL NFR BLD AUTO: 3.1 % (ref 0.3–6.2)
ERYTHROCYTE [DISTWIDTH] IN BLOOD BY AUTOMATED COUNT: 13.6 % (ref 12.3–15.4)
GLOBULIN UR ELPH-MCNC: 3 GM/DL (ref 2.3–3.5)
GLUCOSE SERPL-MCNC: 417 MG/DL (ref 70–99)
HCT VFR BLD AUTO: 38 % (ref 34–46.6)
HDLC SERPL-MCNC: 43 MG/DL (ref 40–59)
HGB BLD-MCNC: 12.9 G/DL (ref 12–15.9)
LDLC SERPL CALC-MCNC: 95 MG/DL
LDLC/HDLC SERPL: 2.08 {RATIO} (ref 0–3.22)
LYMPHOCYTES # BLD AUTO: 1.97 10*3/MM3 (ref 0.7–3.1)
LYMPHOCYTES NFR BLD AUTO: 18.3 % (ref 19.6–45.3)
MCH RBC QN AUTO: 30.8 PG (ref 26.6–33)
MCHC RBC AUTO-ENTMCNC: 33.9 G/DL (ref 31.5–35.7)
MCV RBC AUTO: 90.7 FL (ref 79–97)
MONOCYTES # BLD AUTO: 0.63 10*3/MM3 (ref 0.1–0.9)
MONOCYTES NFR BLD AUTO: 5.8 % (ref 5–12)
NEUTROPHILS NFR BLD AUTO: 7.78 10*3/MM3 (ref 1.7–7)
NEUTROPHILS NFR BLD AUTO: 72.2 % (ref 42.7–76)
PLATELET # BLD AUTO: 431 10*3/MM3 (ref 140–450)
PMV BLD AUTO: 9.9 FL (ref 6–12)
POTASSIUM SERPL-SCNC: 5.3 MMOL/L (ref 3.4–5)
PROT SERPL-MCNC: 7.3 G/DL (ref 6.3–8.6)
RBC # BLD AUTO: 4.19 10*6/MM3 (ref 3.77–5.28)
SODIUM SERPL-SCNC: 137 MMOL/L (ref 137–145)
TRIGL SERPL-MCNC: 197 MG/DL
VLDLC SERPL-MCNC: 34 MG/DL (ref 5–40)
WBC NRBC COR # BLD: 10.77 10*3/MM3 (ref 3.4–10.8)

## 2022-05-24 PROCEDURE — G0481 DRUG TEST DEF 8-14 CLASSES: HCPCS | Performed by: NURSE PRACTITIONER

## 2022-05-24 PROCEDURE — 85025 COMPLETE CBC W/AUTO DIFF WBC: CPT | Performed by: NURSE PRACTITIONER

## 2022-05-24 PROCEDURE — 36415 COLL VENOUS BLD VENIPUNCTURE: CPT | Performed by: NURSE PRACTITIONER

## 2022-05-24 PROCEDURE — 82607 VITAMIN B-12: CPT | Performed by: NURSE PRACTITIONER

## 2022-05-24 PROCEDURE — 80061 LIPID PANEL: CPT | Performed by: NURSE PRACTITIONER

## 2022-05-24 PROCEDURE — 80053 COMPREHEN METABOLIC PANEL: CPT | Performed by: NURSE PRACTITIONER

## 2022-05-24 PROCEDURE — 83036 HEMOGLOBIN GLYCOSYLATED A1C: CPT | Performed by: NURSE PRACTITIONER

## 2022-05-24 PROCEDURE — 99214 OFFICE O/P EST MOD 30 MIN: CPT | Performed by: NURSE PRACTITIONER

## 2022-05-24 PROCEDURE — 82746 ASSAY OF FOLIC ACID SERUM: CPT | Performed by: NURSE PRACTITIONER

## 2022-05-24 PROCEDURE — 82306 VITAMIN D 25 HYDROXY: CPT | Performed by: NURSE PRACTITIONER

## 2022-05-24 PROCEDURE — 80307 DRUG TEST PRSMV CHEM ANLYZR: CPT | Performed by: NURSE PRACTITIONER

## 2022-05-24 RX ORDER — SYRINGE W-NEEDLE,DISPOSAB,3 ML 25GX5/8"
1 SYRINGE, EMPTY DISPOSABLE MISCELLANEOUS
Qty: 10 EACH | Refills: 2 | Status: SHIPPED | OUTPATIENT
Start: 2022-05-24

## 2022-05-24 RX ORDER — LORAZEPAM 0.5 MG/1
0.5 TABLET ORAL EVERY 8 HOURS
Qty: 90 TABLET | Refills: 0 | Status: SHIPPED | OUTPATIENT
Start: 2022-05-24 | End: 2022-06-17 | Stop reason: HOSPADM

## 2022-05-24 RX ORDER — GABAPENTIN 400 MG/1
400 CAPSULE ORAL 3 TIMES DAILY
Qty: 90 CAPSULE | Refills: 2 | Status: SHIPPED | OUTPATIENT
Start: 2022-05-24 | End: 2022-06-17 | Stop reason: HOSPADM

## 2022-05-24 RX ORDER — NEEDLES, SAFETY 25GX1 1/2"
1 NEEDLE, DISPOSABLE MISCELLANEOUS
Qty: 10 EACH | Refills: 3 | Status: SHIPPED | OUTPATIENT
Start: 2022-05-24

## 2022-05-24 RX ORDER — MONTELUKAST SODIUM 10 MG/1
10 TABLET ORAL NIGHTLY
Qty: 30 TABLET | Refills: 5 | Status: SHIPPED | OUTPATIENT
Start: 2022-05-24 | End: 2022-08-24

## 2022-05-24 RX ORDER — NEEDLES, SAFETY 18GX1 1/2"
NEEDLE, DISPOSABLE MISCELLANEOUS
Status: CANCELLED | OUTPATIENT
Start: 2022-05-24

## 2022-05-24 RX ORDER — HYDROCODONE BITARTRATE AND ACETAMINOPHEN 7.5; 325 MG/1; MG/1
1 TABLET ORAL EVERY 6 HOURS PRN
Qty: 120 TABLET | Refills: 0 | Status: CANCELLED | OUTPATIENT
Start: 2022-05-24

## 2022-05-25 LAB
25(OH)D3 SERPL-MCNC: 22.9 NG/ML (ref 30–100)
FOLATE SERPL-MCNC: >20 NG/ML (ref 4.78–24.2)
HBA1C MFR BLD: 11.3 % (ref 4.8–5.6)
VIT B12 BLD-MCNC: 655 PG/ML (ref 211–946)

## 2022-05-26 LAB — GABAPENTIN UR-MCNC: 69 UG/ML

## 2022-05-31 ENCOUNTER — HOSPITAL ENCOUNTER (OUTPATIENT)
Facility: HOSPITAL | Age: 62
Setting detail: OBSERVATION
Discharge: HOME HEALTH | End: 2022-06-03
Attending: FAMILY MEDICINE | Admitting: INTERNAL MEDICINE

## 2022-05-31 ENCOUNTER — APPOINTMENT (OUTPATIENT)
Dept: MRI IMAGING | Facility: HOSPITAL | Age: 62
End: 2022-05-31

## 2022-05-31 ENCOUNTER — APPOINTMENT (OUTPATIENT)
Dept: GENERAL RADIOLOGY | Facility: HOSPITAL | Age: 62
End: 2022-05-31

## 2022-05-31 ENCOUNTER — LAB (OUTPATIENT)
Dept: LAB | Facility: HOSPITAL | Age: 62
End: 2022-05-31

## 2022-05-31 ENCOUNTER — APPOINTMENT (OUTPATIENT)
Dept: CT IMAGING | Facility: HOSPITAL | Age: 62
End: 2022-05-31

## 2022-05-31 DIAGNOSIS — I10 UNCONTROLLED HYPERTENSION: ICD-10-CM

## 2022-05-31 DIAGNOSIS — I10 ESSENTIAL HYPERTENSION: Chronic | ICD-10-CM

## 2022-05-31 DIAGNOSIS — I95.9 HYPOTENSION, UNSPECIFIED HYPOTENSION TYPE: Primary | ICD-10-CM

## 2022-05-31 DIAGNOSIS — E43 SEVERE MALNUTRITION: ICD-10-CM

## 2022-05-31 DIAGNOSIS — I10 PRIMARY HYPERTENSION: ICD-10-CM

## 2022-05-31 DIAGNOSIS — Z78.9 IMPAIRED MOBILITY AND ADLS: ICD-10-CM

## 2022-05-31 DIAGNOSIS — Z74.09 IMPAIRED MOBILITY AND ADLS: ICD-10-CM

## 2022-05-31 DIAGNOSIS — Z79.4 TYPE 2 DIABETES MELLITUS WITH DIABETIC POLYNEUROPATHY, WITH LONG-TERM CURRENT USE OF INSULIN: ICD-10-CM

## 2022-05-31 DIAGNOSIS — Z74.09 IMPAIRED FUNCTIONAL MOBILITY, BALANCE, GAIT, AND ENDURANCE: ICD-10-CM

## 2022-05-31 DIAGNOSIS — E78.2 MIXED HYPERLIPIDEMIA: ICD-10-CM

## 2022-05-31 DIAGNOSIS — E55.9 VITAMIN D DEFICIENCY: ICD-10-CM

## 2022-05-31 DIAGNOSIS — Z79.4 TYPE 2 DIABETES MELLITUS WITH HYPERGLYCEMIA, WITH LONG-TERM CURRENT USE OF INSULIN: ICD-10-CM

## 2022-05-31 DIAGNOSIS — IMO0002 UNCONTROLLED TYPE 2 DIABETES MELLITUS WITH NEUROLOGIC COMPLICATION, WITH LONG-TERM CURRENT USE OF INSULIN: ICD-10-CM

## 2022-05-31 DIAGNOSIS — E11.42 TYPE 2 DIABETES MELLITUS WITH DIABETIC POLYNEUROPATHY, WITH LONG-TERM CURRENT USE OF INSULIN: ICD-10-CM

## 2022-05-31 DIAGNOSIS — R47.1 DYSARTHRIA: ICD-10-CM

## 2022-05-31 DIAGNOSIS — R29.90 STROKE-LIKE SYMPTOM: ICD-10-CM

## 2022-05-31 DIAGNOSIS — E11.65 TYPE 2 DIABETES MELLITUS WITH HYPERGLYCEMIA, WITH LONG-TERM CURRENT USE OF INSULIN: ICD-10-CM

## 2022-05-31 PROBLEM — R41.0 CONFUSION: Status: ACTIVE | Noted: 2022-05-31

## 2022-05-31 PROBLEM — N17.9 AKI (ACUTE KIDNEY INJURY): Status: ACTIVE | Noted: 2022-05-31

## 2022-05-31 LAB
25(OH)D3 SERPL-MCNC: 24.5 NG/ML (ref 30–100)
ALBUMIN SERPL-MCNC: 3.6 G/DL (ref 3.5–5.2)
ALBUMIN SERPL-MCNC: 3.9 G/DL (ref 3.5–5.2)
ALBUMIN/GLOB SERPL: 1.2 G/DL
ALBUMIN/GLOB SERPL: 1.3 G/DL
ALP SERPL-CCNC: 130 U/L (ref 39–117)
ALP SERPL-CCNC: 141 U/L (ref 39–117)
ALT SERPL W P-5'-P-CCNC: 13 U/L (ref 1–33)
ALT SERPL W P-5'-P-CCNC: 16 U/L (ref 1–33)
ANION GAP SERPL CALCULATED.3IONS-SCNC: 12 MMOL/L (ref 5–15)
ANION GAP SERPL CALCULATED.3IONS-SCNC: 16 MMOL/L (ref 5–15)
APTT PPP: 25.4 SECONDS (ref 20–40.3)
AST SERPL-CCNC: 11 U/L (ref 1–32)
AST SERPL-CCNC: 16 U/L (ref 1–32)
BASOPHILS # BLD AUTO: 0.04 10*3/MM3 (ref 0–0.2)
BASOPHILS # BLD AUTO: 0.08 10*3/MM3 (ref 0–0.2)
BASOPHILS NFR BLD AUTO: 0.4 % (ref 0–1.5)
BASOPHILS NFR BLD AUTO: 0.6 % (ref 0–1.5)
BILIRUB SERPL-MCNC: 0.2 MG/DL (ref 0–1.2)
BILIRUB SERPL-MCNC: 0.3 MG/DL (ref 0–1.2)
BUN SERPL-MCNC: 27 MG/DL (ref 8–23)
BUN SERPL-MCNC: 34 MG/DL (ref 8–23)
BUN/CREAT SERPL: 11.9 (ref 7–25)
BUN/CREAT SERPL: 13.2 (ref 7–25)
CALCIUM SPEC-SCNC: 8.7 MG/DL (ref 8.6–10.5)
CALCIUM SPEC-SCNC: 9 MG/DL (ref 8.6–10.5)
CHLORIDE SERPL-SCNC: 95 MMOL/L (ref 98–107)
CHLORIDE SERPL-SCNC: 96 MMOL/L (ref 98–107)
CHOLEST SERPL-MCNC: 191 MG/DL (ref 0–200)
CK SERPL-CCNC: 127 U/L (ref 20–180)
CO2 SERPL-SCNC: 20 MMOL/L (ref 22–29)
CO2 SERPL-SCNC: 23 MMOL/L (ref 22–29)
CREAT SERPL-MCNC: 2.05 MG/DL (ref 0.57–1)
CREAT SERPL-MCNC: 2.86 MG/DL (ref 0.57–1)
D-LACTATE SERPL-SCNC: 1.5 MMOL/L (ref 0.5–2)
DEPRECATED RDW RBC AUTO: 44.9 FL (ref 37–54)
DEPRECATED RDW RBC AUTO: 45.2 FL (ref 37–54)
EGFRCR SERPLBLD CKD-EPI 2021: 18.3 ML/MIN/1.73
EGFRCR SERPLBLD CKD-EPI 2021: 27.3 ML/MIN/1.73
EOSINOPHIL # BLD AUTO: 0.26 10*3/MM3 (ref 0–0.4)
EOSINOPHIL # BLD AUTO: 0.35 10*3/MM3 (ref 0–0.4)
EOSINOPHIL NFR BLD AUTO: 1.9 % (ref 0.3–6.2)
EOSINOPHIL NFR BLD AUTO: 3.2 % (ref 0.3–6.2)
ERYTHROCYTE [DISTWIDTH] IN BLOOD BY AUTOMATED COUNT: 13.7 % (ref 12.3–15.4)
ERYTHROCYTE [DISTWIDTH] IN BLOOD BY AUTOMATED COUNT: 13.7 % (ref 12.3–15.4)
GLOBULIN UR ELPH-MCNC: 2.8 GM/DL
GLOBULIN UR ELPH-MCNC: 3.2 GM/DL
GLUCOSE SERPL-MCNC: 203 MG/DL (ref 65–99)
GLUCOSE SERPL-MCNC: 432 MG/DL (ref 65–99)
HCT VFR BLD AUTO: 34.7 % (ref 34–46.6)
HCT VFR BLD AUTO: 38.3 % (ref 34–46.6)
HDLC SERPL-MCNC: 41 MG/DL (ref 40–60)
HGB BLD-MCNC: 11.5 G/DL (ref 12–15.9)
HGB BLD-MCNC: 12.8 G/DL (ref 12–15.9)
HOLD SPECIMEN: NORMAL
IMM GRANULOCYTES # BLD AUTO: 0.05 10*3/MM3 (ref 0–0.05)
IMM GRANULOCYTES # BLD AUTO: 0.09 10*3/MM3 (ref 0–0.05)
IMM GRANULOCYTES NFR BLD AUTO: 0.5 % (ref 0–0.5)
IMM GRANULOCYTES NFR BLD AUTO: 0.6 % (ref 0–0.5)
INR PPP: 0.92 (ref 0.8–1.2)
LDLC SERPL CALC-MCNC: 104 MG/DL (ref 0–100)
LDLC/HDLC SERPL: 2.34 {RATIO}
LYMPHOCYTES # BLD AUTO: 2.01 10*3/MM3 (ref 0.7–3.1)
LYMPHOCYTES # BLD AUTO: 2.56 10*3/MM3 (ref 0.7–3.1)
LYMPHOCYTES NFR BLD AUTO: 14.4 % (ref 19.6–45.3)
LYMPHOCYTES NFR BLD AUTO: 23.6 % (ref 19.6–45.3)
MAGNESIUM SERPL-MCNC: 1.8 MG/DL (ref 1.6–2.4)
MCH RBC QN AUTO: 30.2 PG (ref 26.6–33)
MCH RBC QN AUTO: 30.3 PG (ref 26.6–33)
MCHC RBC AUTO-ENTMCNC: 33.1 G/DL (ref 31.5–35.7)
MCHC RBC AUTO-ENTMCNC: 33.4 G/DL (ref 31.5–35.7)
MCV RBC AUTO: 90.3 FL (ref 79–97)
MCV RBC AUTO: 91.3 FL (ref 79–97)
MONOCYTES # BLD AUTO: 0.56 10*3/MM3 (ref 0.1–0.9)
MONOCYTES # BLD AUTO: 0.73 10*3/MM3 (ref 0.1–0.9)
MONOCYTES NFR BLD AUTO: 5.2 % (ref 5–12)
MONOCYTES NFR BLD AUTO: 5.2 % (ref 5–12)
NEUTROPHILS NFR BLD AUTO: 10.78 10*3/MM3 (ref 1.7–7)
NEUTROPHILS NFR BLD AUTO: 67.1 % (ref 42.7–76)
NEUTROPHILS NFR BLD AUTO: 7.28 10*3/MM3 (ref 1.7–7)
NEUTROPHILS NFR BLD AUTO: 77.3 % (ref 42.7–76)
NRBC BLD AUTO-RTO: 0 /100 WBC (ref 0–0.2)
NRBC BLD AUTO-RTO: 0 /100 WBC (ref 0–0.2)
NT-PROBNP SERPL-MCNC: 805.7 PG/ML (ref 0–900)
PLATELET # BLD AUTO: 356 10*3/MM3 (ref 140–450)
PLATELET # BLD AUTO: 381 10*3/MM3 (ref 140–450)
PMV BLD AUTO: 10.8 FL (ref 6–12)
PMV BLD AUTO: 9.8 FL (ref 6–12)
POTASSIUM SERPL-SCNC: 5.1 MMOL/L (ref 3.5–5.2)
POTASSIUM SERPL-SCNC: 5.2 MMOL/L (ref 3.5–5.2)
PROT SERPL-MCNC: 6.4 G/DL (ref 6–8.5)
PROT SERPL-MCNC: 7.1 G/DL (ref 6–8.5)
PROTHROMBIN TIME: 12.2 SECONDS (ref 11.1–15.3)
RBC # BLD AUTO: 3.8 10*6/MM3 (ref 3.77–5.28)
RBC # BLD AUTO: 4.24 10*6/MM3 (ref 3.77–5.28)
SODIUM SERPL-SCNC: 130 MMOL/L (ref 136–145)
SODIUM SERPL-SCNC: 132 MMOL/L (ref 136–145)
T4 FREE SERPL-MCNC: 1.13 NG/DL (ref 0.93–1.7)
TRIGL SERPL-MCNC: 270 MG/DL (ref 0–150)
TROPONIN T SERPL-MCNC: <0.01 NG/ML (ref 0–0.03)
TSH SERPL DL<=0.05 MIU/L-ACNC: 5.71 UIU/ML (ref 0.27–4.2)
VIT B12 BLD-MCNC: 1495 PG/ML (ref 211–946)
VLDLC SERPL-MCNC: 46 MG/DL (ref 5–40)
WBC NRBC COR # BLD: 10.84 10*3/MM3 (ref 3.4–10.8)
WBC NRBC COR # BLD: 13.95 10*3/MM3 (ref 3.4–10.8)

## 2022-05-31 PROCEDURE — 82550 ASSAY OF CK (CPK): CPT | Performed by: FAMILY MEDICINE

## 2022-05-31 PROCEDURE — 84439 ASSAY OF FREE THYROXINE: CPT

## 2022-05-31 PROCEDURE — 96372 THER/PROPH/DIAG INJ SC/IM: CPT

## 2022-05-31 PROCEDURE — 93005 ELECTROCARDIOGRAM TRACING: CPT | Performed by: FAMILY MEDICINE

## 2022-05-31 PROCEDURE — 96375 TX/PRO/DX INJ NEW DRUG ADDON: CPT

## 2022-05-31 PROCEDURE — 93010 ELECTROCARDIOGRAM REPORT: CPT | Performed by: INTERNAL MEDICINE

## 2022-05-31 PROCEDURE — 99285 EMERGENCY DEPT VISIT HI MDM: CPT

## 2022-05-31 PROCEDURE — 99215 OFFICE O/P EST HI 40 MIN: CPT | Performed by: PSYCHIATRY & NEUROLOGY

## 2022-05-31 PROCEDURE — 85610 PROTHROMBIN TIME: CPT | Performed by: FAMILY MEDICINE

## 2022-05-31 PROCEDURE — 80061 LIPID PANEL: CPT

## 2022-05-31 PROCEDURE — 83605 ASSAY OF LACTIC ACID: CPT | Performed by: FAMILY MEDICINE

## 2022-05-31 PROCEDURE — 83735 ASSAY OF MAGNESIUM: CPT | Performed by: FAMILY MEDICINE

## 2022-05-31 PROCEDURE — 85025 COMPLETE CBC W/AUTO DIFF WBC: CPT | Performed by: NURSE PRACTITIONER

## 2022-05-31 PROCEDURE — G0378 HOSPITAL OBSERVATION PER HR: HCPCS

## 2022-05-31 PROCEDURE — 82962 GLUCOSE BLOOD TEST: CPT

## 2022-05-31 PROCEDURE — 84484 ASSAY OF TROPONIN QUANT: CPT | Performed by: FAMILY MEDICINE

## 2022-05-31 PROCEDURE — 83880 ASSAY OF NATRIURETIC PEPTIDE: CPT | Performed by: FAMILY MEDICINE

## 2022-05-31 PROCEDURE — 87040 BLOOD CULTURE FOR BACTERIA: CPT | Performed by: FAMILY MEDICINE

## 2022-05-31 PROCEDURE — 25010000002 ONDANSETRON PER 1 MG: Performed by: PSYCHIATRY & NEUROLOGY

## 2022-05-31 PROCEDURE — 25010000002 CYANOCOBALAMIN PER 1000 MCG

## 2022-05-31 PROCEDURE — 36415 COLL VENOUS BLD VENIPUNCTURE: CPT | Performed by: NURSE PRACTITIONER

## 2022-05-31 PROCEDURE — 82607 VITAMIN B-12: CPT

## 2022-05-31 PROCEDURE — 71045 X-RAY EXAM CHEST 1 VIEW: CPT

## 2022-05-31 PROCEDURE — 70450 CT HEAD/BRAIN W/O DYE: CPT

## 2022-05-31 PROCEDURE — 80050 GENERAL HEALTH PANEL: CPT

## 2022-05-31 PROCEDURE — 96361 HYDRATE IV INFUSION ADD-ON: CPT

## 2022-05-31 PROCEDURE — 85730 THROMBOPLASTIN TIME PARTIAL: CPT | Performed by: FAMILY MEDICINE

## 2022-05-31 PROCEDURE — 25010000002 ENOXAPARIN PER 10 MG

## 2022-05-31 PROCEDURE — 80053 COMPREHEN METABOLIC PANEL: CPT | Performed by: NURSE PRACTITIONER

## 2022-05-31 PROCEDURE — 82306 VITAMIN D 25 HYDROXY: CPT

## 2022-05-31 RX ORDER — FAMOTIDINE 20 MG/1
20 TABLET, FILM COATED ORAL 2 TIMES DAILY PRN
Status: DISCONTINUED | OUTPATIENT
Start: 2022-05-31 | End: 2022-06-03 | Stop reason: HOSPADM

## 2022-05-31 RX ORDER — ATORVASTATIN CALCIUM 40 MG/1
80 TABLET, FILM COATED ORAL NIGHTLY
Status: DISCONTINUED | OUTPATIENT
Start: 2022-05-31 | End: 2022-06-03 | Stop reason: HOSPADM

## 2022-05-31 RX ORDER — NITROGLYCERIN 0.4 MG/1
0.4 TABLET SUBLINGUAL
Status: DISCONTINUED | OUTPATIENT
Start: 2022-05-31 | End: 2022-06-03 | Stop reason: HOSPADM

## 2022-05-31 RX ORDER — BISACODYL 10 MG
10 SUPPOSITORY, RECTAL RECTAL DAILY PRN
Status: DISCONTINUED | OUTPATIENT
Start: 2022-05-31 | End: 2022-06-03 | Stop reason: HOSPADM

## 2022-05-31 RX ORDER — MONTELUKAST SODIUM 10 MG/1
10 TABLET ORAL NIGHTLY
Status: DISCONTINUED | OUTPATIENT
Start: 2022-05-31 | End: 2022-06-03 | Stop reason: HOSPADM

## 2022-05-31 RX ORDER — ONDANSETRON 4 MG/1
8 TABLET, FILM COATED ORAL EVERY 8 HOURS PRN
Status: DISCONTINUED | OUTPATIENT
Start: 2022-05-31 | End: 2022-06-03 | Stop reason: HOSPADM

## 2022-05-31 RX ORDER — GABAPENTIN 400 MG/1
400 CAPSULE ORAL EVERY 12 HOURS SCHEDULED
Status: DISCONTINUED | OUTPATIENT
Start: 2022-05-31 | End: 2022-06-03 | Stop reason: HOSPADM

## 2022-05-31 RX ORDER — LORAZEPAM 0.5 MG/1
0.5 TABLET ORAL EVERY 8 HOURS
Status: DISCONTINUED | OUTPATIENT
Start: 2022-05-31 | End: 2022-06-03 | Stop reason: HOSPADM

## 2022-05-31 RX ORDER — ISOSORBIDE MONONITRATE 30 MG/1
30 TABLET, EXTENDED RELEASE ORAL
Status: DISCONTINUED | OUTPATIENT
Start: 2022-06-01 | End: 2022-06-03 | Stop reason: HOSPADM

## 2022-05-31 RX ORDER — CYANOCOBALAMIN 1000 UG/ML
1000 INJECTION, SOLUTION INTRAMUSCULAR; SUBCUTANEOUS
Status: DISCONTINUED | OUTPATIENT
Start: 2022-05-31 | End: 2022-06-03 | Stop reason: HOSPADM

## 2022-05-31 RX ORDER — SODIUM CHLORIDE 9 MG/ML
125 INJECTION, SOLUTION INTRAVENOUS CONTINUOUS
Status: DISCONTINUED | OUTPATIENT
Start: 2022-05-31 | End: 2022-05-31

## 2022-05-31 RX ORDER — ALUMINA, MAGNESIA, AND SIMETHICONE 2400; 2400; 240 MG/30ML; MG/30ML; MG/30ML
7.5 SUSPENSION ORAL EVERY 4 HOURS PRN
Status: DISCONTINUED | OUTPATIENT
Start: 2022-05-31 | End: 2022-06-03 | Stop reason: HOSPADM

## 2022-05-31 RX ORDER — SODIUM CHLORIDE 0.9 % (FLUSH) 0.9 %
10 SYRINGE (ML) INJECTION AS NEEDED
Status: DISCONTINUED | OUTPATIENT
Start: 2022-05-31 | End: 2022-06-03 | Stop reason: HOSPADM

## 2022-05-31 RX ORDER — MECLIZINE HYDROCHLORIDE 25 MG/1
25 TABLET ORAL 3 TIMES DAILY PRN
Status: DISCONTINUED | OUTPATIENT
Start: 2022-05-31 | End: 2022-06-03 | Stop reason: HOSPADM

## 2022-05-31 RX ORDER — ACETAMINOPHEN 325 MG/1
650 TABLET ORAL EVERY 4 HOURS PRN
Status: DISCONTINUED | OUTPATIENT
Start: 2022-05-31 | End: 2022-06-03 | Stop reason: HOSPADM

## 2022-05-31 RX ORDER — PANTOPRAZOLE SODIUM 40 MG/1
40 TABLET, DELAYED RELEASE ORAL DAILY
Status: DISCONTINUED | OUTPATIENT
Start: 2022-06-01 | End: 2022-06-03 | Stop reason: HOSPADM

## 2022-05-31 RX ORDER — ASPIRIN 300 MG/1
300 SUPPOSITORY RECTAL DAILY
Status: DISCONTINUED | OUTPATIENT
Start: 2022-05-31 | End: 2022-06-03 | Stop reason: HOSPADM

## 2022-05-31 RX ORDER — MELATONIN
5000 DAILY
Refills: 1 | Status: DISCONTINUED | OUTPATIENT
Start: 2022-06-01 | End: 2022-06-03 | Stop reason: HOSPADM

## 2022-05-31 RX ORDER — METOPROLOL SUCCINATE 50 MG/1
50 TABLET, EXTENDED RELEASE ORAL DAILY
Status: DISCONTINUED | OUTPATIENT
Start: 2022-06-01 | End: 2022-06-01

## 2022-05-31 RX ORDER — METHOCARBAMOL 500 MG/1
500 TABLET, FILM COATED ORAL 2 TIMES DAILY PRN
Status: DISCONTINUED | OUTPATIENT
Start: 2022-05-31 | End: 2022-06-03 | Stop reason: HOSPADM

## 2022-05-31 RX ORDER — HYDROCODONE BITARTRATE AND ACETAMINOPHEN 7.5; 325 MG/1; MG/1
1 TABLET ORAL EVERY 6 HOURS PRN
Status: DISCONTINUED | OUTPATIENT
Start: 2022-05-31 | End: 2022-06-03 | Stop reason: HOSPADM

## 2022-05-31 RX ORDER — SODIUM CHLORIDE 0.9 % (FLUSH) 0.9 %
10 SYRINGE (ML) INJECTION EVERY 12 HOURS SCHEDULED
Status: DISCONTINUED | OUTPATIENT
Start: 2022-05-31 | End: 2022-06-03 | Stop reason: HOSPADM

## 2022-05-31 RX ORDER — FOLIC ACID 1 MG/1
1000 TABLET ORAL DAILY
Status: DISCONTINUED | OUTPATIENT
Start: 2022-06-01 | End: 2022-06-03 | Stop reason: HOSPADM

## 2022-05-31 RX ORDER — ASPIRIN 325 MG
325 TABLET ORAL DAILY
Status: DISCONTINUED | OUTPATIENT
Start: 2022-05-31 | End: 2022-06-03 | Stop reason: HOSPADM

## 2022-05-31 RX ORDER — ONDANSETRON 2 MG/ML
4 INJECTION INTRAMUSCULAR; INTRAVENOUS EVERY 6 HOURS PRN
Status: DISCONTINUED | OUTPATIENT
Start: 2022-05-31 | End: 2022-06-03 | Stop reason: HOSPADM

## 2022-05-31 RX ORDER — RANOLAZINE 500 MG/1
500 TABLET, EXTENDED RELEASE ORAL EVERY 12 HOURS SCHEDULED
Status: DISCONTINUED | OUTPATIENT
Start: 2022-05-31 | End: 2022-06-03 | Stop reason: HOSPADM

## 2022-05-31 RX ORDER — ACETAMINOPHEN 650 MG/1
650 SUPPOSITORY RECTAL EVERY 4 HOURS PRN
Status: DISCONTINUED | OUTPATIENT
Start: 2022-05-31 | End: 2022-06-03 | Stop reason: HOSPADM

## 2022-05-31 RX ORDER — ENOXAPARIN SODIUM 100 MG/ML
30 INJECTION SUBCUTANEOUS EVERY 24 HOURS
Status: DISCONTINUED | OUTPATIENT
Start: 2022-05-31 | End: 2022-06-03 | Stop reason: HOSPADM

## 2022-05-31 RX ADMIN — ACETAMINOPHEN 650 MG: 325 TABLET ORAL at 19:57

## 2022-05-31 RX ADMIN — MONTELUKAST 10 MG: 10 TABLET, FILM COATED ORAL at 23:48

## 2022-05-31 RX ADMIN — ENOXAPARIN SODIUM 30 MG: 30 INJECTION SUBCUTANEOUS at 23:48

## 2022-05-31 RX ADMIN — RANOLAZINE 500 MG: 500 TABLET, FILM COATED, EXTENDED RELEASE ORAL at 23:00

## 2022-05-31 RX ADMIN — ATORVASTATIN CALCIUM 80 MG: 40 TABLET, FILM COATED ORAL at 21:03

## 2022-05-31 RX ADMIN — ASPIRIN 325 MG: 325 TABLET ORAL at 19:19

## 2022-05-31 RX ADMIN — SODIUM CHLORIDE 125 ML/HR: 9 INJECTION, SOLUTION INTRAVENOUS at 19:12

## 2022-05-31 RX ADMIN — ONDANSETRON 4 MG: 2 INJECTION INTRAMUSCULAR; INTRAVENOUS at 19:18

## 2022-05-31 RX ADMIN — Medication 10 ML: at 23:40

## 2022-05-31 RX ADMIN — GABAPENTIN 400 MG: 400 CAPSULE ORAL at 23:00

## 2022-05-31 RX ADMIN — CYANOCOBALAMIN 1000 MCG: 1000 INJECTION, SOLUTION INTRAMUSCULAR at 23:48

## 2022-05-31 RX ADMIN — ALUMINUM HYDROXIDE, MAGNESIUM HYDROXIDE, AND DIMETHICONE 7.5 ML: 400; 400; 40 SUSPENSION ORAL at 20:29

## 2022-05-31 RX ADMIN — LORAZEPAM 0.5 MG: 0.5 TABLET ORAL at 23:00

## 2022-06-01 ENCOUNTER — APPOINTMENT (OUTPATIENT)
Dept: MRI IMAGING | Facility: HOSPITAL | Age: 62
End: 2022-06-01

## 2022-06-01 ENCOUNTER — APPOINTMENT (OUTPATIENT)
Dept: CT IMAGING | Facility: HOSPITAL | Age: 62
End: 2022-06-01

## 2022-06-01 ENCOUNTER — APPOINTMENT (OUTPATIENT)
Dept: CARDIOLOGY | Facility: HOSPITAL | Age: 62
End: 2022-06-01

## 2022-06-01 LAB
ALBUMIN SERPL-MCNC: 3.2 G/DL (ref 3.5–5.2)
ALBUMIN/GLOB SERPL: 1.2 G/DL
ALP SERPL-CCNC: 118 U/L (ref 39–117)
ALT SERPL W P-5'-P-CCNC: 12 U/L (ref 1–33)
AMPHET+METHAMPHET UR QL: NEGATIVE
AMPHETAMINES UR QL: NEGATIVE
ANION GAP SERPL CALCULATED.3IONS-SCNC: 15 MMOL/L (ref 5–15)
AST SERPL-CCNC: 13 U/L (ref 1–32)
BARBITURATES UR QL SCN: NEGATIVE
BENZODIAZ UR QL SCN: POSITIVE
BH CV ECHO MEAS - AO MAX PG: 11.9 MMHG
BH CV ECHO MEAS - AO V2 MAX: 172.5 CM/SEC
BH CV ECHO MEAS - EDV(CUBED): 121.1 ML
BH CV ECHO MEAS - ESV(CUBED): 24.1 ML
BH CV ECHO MEAS - FS: 41.6 %
BH CV ECHO MEAS - IVS/LVPW: 1.09 CM
BH CV ECHO MEAS - IVSD: 1.2 CM
BH CV ECHO MEAS - LV MASS(C)D: 218.1 GRAMS
BH CV ECHO MEAS - LVIDD: 4.9 CM
BH CV ECHO MEAS - LVIDS: 2.9 CM
BH CV ECHO MEAS - LVPWD: 1.11 CM
BH CV ECHO MEAS - MR MAX PG: 38.2 MMHG
BH CV ECHO MEAS - MR MAX VEL: 309.2 CM/SEC
BH CV ECHO MEAS - RAP SYSTOLE: 5 MMHG
BH CV ECHO MEAS - RVSP: 40.5 MMHG
BH CV ECHO MEAS - TR MAX PG: 35.5 MMHG
BH CV ECHO MEAS - TR MAX VEL: 298 CM/SEC
BILIRUB SERPL-MCNC: 0.2 MG/DL (ref 0–1.2)
BILIRUB UR QL STRIP: NEGATIVE
BUN SERPL-MCNC: 35 MG/DL (ref 8–23)
BUN/CREAT SERPL: 12.5 (ref 7–25)
BUPRENORPHINE SERPL-MCNC: NEGATIVE NG/ML
CALCIUM SPEC-SCNC: 8.1 MG/DL (ref 8.6–10.5)
CANNABINOIDS SERPL QL: NEGATIVE
CHLORIDE SERPL-SCNC: 98 MMOL/L (ref 98–107)
CHOLEST SERPL-MCNC: 154 MG/DL (ref 0–200)
CLARITY UR: CLEAR
CO2 SERPL-SCNC: 18 MMOL/L (ref 22–29)
COCAINE UR QL: NEGATIVE
COLOR UR: YELLOW
CREAT SERPL-MCNC: 2.8 MG/DL (ref 0.57–1)
DEPRECATED RDW RBC AUTO: 44.4 FL (ref 37–54)
EGFRCR SERPLBLD CKD-EPI 2021: 18.8 ML/MIN/1.73
ERYTHROCYTE [DISTWIDTH] IN BLOOD BY AUTOMATED COUNT: 13.6 % (ref 12.3–15.4)
ERYTHROCYTE [SEDIMENTATION RATE] IN BLOOD: 32 MM/HR (ref 0–20)
FLUAV RNA RESP QL NAA+PROBE: NOT DETECTED
FLUBV RNA RESP QL NAA+PROBE: NOT DETECTED
FOLATE SERPL-MCNC: >20 NG/ML (ref 4.78–24.2)
GLOBULIN UR ELPH-MCNC: 2.7 GM/DL
GLUCOSE BLDC GLUCOMTR-MCNC: 122 MG/DL (ref 70–130)
GLUCOSE BLDC GLUCOMTR-MCNC: 244 MG/DL (ref 70–130)
GLUCOSE BLDC GLUCOMTR-MCNC: 347 MG/DL (ref 70–130)
GLUCOSE BLDC GLUCOMTR-MCNC: 386 MG/DL (ref 70–130)
GLUCOSE SERPL-MCNC: 251 MG/DL (ref 65–99)
GLUCOSE UR STRIP-MCNC: ABNORMAL MG/DL
HBA1C MFR BLD: 11.1 % (ref 4.8–5.6)
HCT VFR BLD AUTO: 34.5 % (ref 34–46.6)
HDLC SERPL-MCNC: 35 MG/DL (ref 40–60)
HGB BLD-MCNC: 11.4 G/DL (ref 12–15.9)
HGB UR QL STRIP.AUTO: NEGATIVE
KETONES UR QL STRIP: ABNORMAL
LDLC SERPL CALC-MCNC: 72 MG/DL (ref 0–100)
LDLC/HDLC SERPL: 1.75 {RATIO}
LEUKOCYTE ESTERASE UR QL STRIP.AUTO: NEGATIVE
LV EF 2D ECHO EST: 56 %
MAXIMAL PREDICTED HEART RATE: 160 BPM
MCH RBC QN AUTO: 30 PG (ref 26.6–33)
MCHC RBC AUTO-ENTMCNC: 33 G/DL (ref 31.5–35.7)
MCV RBC AUTO: 90.8 FL (ref 79–97)
METHADONE UR QL SCN: NEGATIVE
NITRITE UR QL STRIP: NEGATIVE
OPIATES UR QL: POSITIVE
OXYCODONE UR QL SCN: NEGATIVE
PCP UR QL SCN: NEGATIVE
PH UR STRIP.AUTO: <=5 [PH] (ref 5–9)
PLATELET # BLD AUTO: 298 10*3/MM3 (ref 140–450)
PMV BLD AUTO: 10.7 FL (ref 6–12)
POTASSIUM SERPL-SCNC: 5.1 MMOL/L (ref 3.5–5.2)
PROPOXYPH UR QL: NEGATIVE
PROT SERPL-MCNC: 5.9 G/DL (ref 6–8.5)
PROT UR QL STRIP: NEGATIVE
RBC # BLD AUTO: 3.8 10*6/MM3 (ref 3.77–5.28)
SARS-COV-2 RNA RESP QL NAA+PROBE: NOT DETECTED
SODIUM SERPL-SCNC: 131 MMOL/L (ref 136–145)
SP GR UR STRIP: 1.02 (ref 1–1.03)
STRESS TARGET HR: 136 BPM
TRICYCLICS UR QL SCN: NEGATIVE
TRIGL SERPL-MCNC: 288 MG/DL (ref 0–150)
TSH SERPL DL<=0.05 MIU/L-ACNC: 4.24 UIU/ML (ref 0.27–4.2)
UROBILINOGEN UR QL STRIP: ABNORMAL
VIT B12 BLD-MCNC: >2000 PG/ML (ref 211–946)
VLDLC SERPL-MCNC: 47 MG/DL (ref 5–40)
WBC NRBC COR # BLD: 14.22 10*3/MM3 (ref 3.4–10.8)

## 2022-06-01 PROCEDURE — 97162 PT EVAL MOD COMPLEX 30 MIN: CPT

## 2022-06-01 PROCEDURE — 92523 SPEECH SOUND LANG COMPREHEN: CPT | Performed by: SPEECH-LANGUAGE PATHOLOGIST

## 2022-06-01 PROCEDURE — 80050 GENERAL HEALTH PANEL: CPT | Performed by: PSYCHIATRY & NEUROLOGY

## 2022-06-01 PROCEDURE — 82962 GLUCOSE BLOOD TEST: CPT

## 2022-06-01 PROCEDURE — 25010000002 ALBUMIN HUMAN 25% PER 50 ML: Performed by: INTERNAL MEDICINE

## 2022-06-01 PROCEDURE — 63710000001 INSULIN DETEMIR PER 5 UNITS

## 2022-06-01 PROCEDURE — 93325 DOPPLER ECHO COLOR FLOW MAPG: CPT | Performed by: INTERNAL MEDICINE

## 2022-06-01 PROCEDURE — 96361 HYDRATE IV INFUSION ADD-ON: CPT

## 2022-06-01 PROCEDURE — 97166 OT EVAL MOD COMPLEX 45 MIN: CPT

## 2022-06-01 PROCEDURE — 96365 THER/PROPH/DIAG IV INF INIT: CPT

## 2022-06-01 PROCEDURE — 93321 DOPPLER ECHO F-UP/LMTD STD: CPT

## 2022-06-01 PROCEDURE — 25010000002 ENOXAPARIN PER 10 MG

## 2022-06-01 PROCEDURE — G0378 HOSPITAL OBSERVATION PER HR: HCPCS

## 2022-06-01 PROCEDURE — 80306 DRUG TEST PRSMV INSTRMNT: CPT | Performed by: STUDENT IN AN ORGANIZED HEALTH CARE EDUCATION/TRAINING PROGRAM

## 2022-06-01 PROCEDURE — 93321 DOPPLER ECHO F-UP/LMTD STD: CPT | Performed by: INTERNAL MEDICINE

## 2022-06-01 PROCEDURE — 83036 HEMOGLOBIN GLYCOSYLATED A1C: CPT | Performed by: PSYCHIATRY & NEUROLOGY

## 2022-06-01 PROCEDURE — 87636 SARSCOV2 & INF A&B AMP PRB: CPT

## 2022-06-01 PROCEDURE — P9612 CATHETERIZE FOR URINE SPEC: HCPCS

## 2022-06-01 PROCEDURE — P9047 ALBUMIN (HUMAN), 25%, 50ML: HCPCS | Performed by: INTERNAL MEDICINE

## 2022-06-01 PROCEDURE — 81003 URINALYSIS AUTO W/O SCOPE: CPT | Performed by: FAMILY MEDICINE

## 2022-06-01 PROCEDURE — 80061 LIPID PANEL: CPT | Performed by: PSYCHIATRY & NEUROLOGY

## 2022-06-01 PROCEDURE — 85651 RBC SED RATE NONAUTOMATED: CPT | Performed by: PSYCHIATRY & NEUROLOGY

## 2022-06-01 PROCEDURE — 93308 TTE F-UP OR LMTD: CPT | Performed by: INTERNAL MEDICINE

## 2022-06-01 PROCEDURE — 99213 OFFICE O/P EST LOW 20 MIN: CPT | Performed by: NURSE PRACTITIONER

## 2022-06-01 PROCEDURE — 82607 VITAMIN B-12: CPT | Performed by: PSYCHIATRY & NEUROLOGY

## 2022-06-01 PROCEDURE — 93325 DOPPLER ECHO COLOR FLOW MAPG: CPT

## 2022-06-01 PROCEDURE — 82746 ASSAY OF FOLIC ACID SERUM: CPT | Performed by: PSYCHIATRY & NEUROLOGY

## 2022-06-01 PROCEDURE — 70450 CT HEAD/BRAIN W/O DYE: CPT

## 2022-06-01 PROCEDURE — 63710000001 INSULIN ASPART PER 5 UNITS: Performed by: INTERNAL MEDICINE

## 2022-06-01 PROCEDURE — 93308 TTE F-UP OR LMTD: CPT

## 2022-06-01 PROCEDURE — 96372 THER/PROPH/DIAG INJ SC/IM: CPT

## 2022-06-01 RX ORDER — SODIUM CHLORIDE 9 MG/ML
50 INJECTION, SOLUTION INTRAVENOUS CONTINUOUS
Status: DISCONTINUED | OUTPATIENT
Start: 2022-06-01 | End: 2022-06-03

## 2022-06-01 RX ORDER — MIDODRINE HYDROCHLORIDE 5 MG/1
5 TABLET ORAL ONCE
Status: COMPLETED | OUTPATIENT
Start: 2022-06-01 | End: 2022-06-01

## 2022-06-01 RX ORDER — DEXTROSE MONOHYDRATE 25 G/50ML
25 INJECTION, SOLUTION INTRAVENOUS
Status: DISCONTINUED | OUTPATIENT
Start: 2022-06-01 | End: 2022-06-03 | Stop reason: HOSPADM

## 2022-06-01 RX ORDER — NICOTINE POLACRILEX 4 MG
15 LOZENGE BUCCAL
Status: DISCONTINUED | OUTPATIENT
Start: 2022-06-01 | End: 2022-06-03 | Stop reason: HOSPADM

## 2022-06-01 RX ORDER — ALBUMIN (HUMAN) 12.5 G/50ML
25 SOLUTION INTRAVENOUS ONCE
Status: COMPLETED | OUTPATIENT
Start: 2022-06-01 | End: 2022-06-01

## 2022-06-01 RX ORDER — INSULIN ASPART 100 [IU]/ML
0-7 INJECTION, SOLUTION INTRAVENOUS; SUBCUTANEOUS
Status: DISCONTINUED | OUTPATIENT
Start: 2022-06-01 | End: 2022-06-02

## 2022-06-01 RX ADMIN — ENOXAPARIN SODIUM 30 MG: 30 INJECTION SUBCUTANEOUS at 22:03

## 2022-06-01 RX ADMIN — ASPIRIN 325 MG: 325 TABLET ORAL at 08:33

## 2022-06-01 RX ADMIN — PANTOPRAZOLE SODIUM 40 MG: 40 TABLET, DELAYED RELEASE ORAL at 08:34

## 2022-06-01 RX ADMIN — GABAPENTIN 400 MG: 400 CAPSULE ORAL at 08:34

## 2022-06-01 RX ADMIN — RANOLAZINE 500 MG: 500 TABLET, FILM COATED, EXTENDED RELEASE ORAL at 08:33

## 2022-06-01 RX ADMIN — ISOSORBIDE MONONITRATE 30 MG: 30 TABLET, EXTENDED RELEASE ORAL at 08:33

## 2022-06-01 RX ADMIN — SODIUM CHLORIDE 125 ML/HR: 9 INJECTION, SOLUTION INTRAVENOUS at 14:32

## 2022-06-01 RX ADMIN — LORAZEPAM 0.5 MG: 0.5 TABLET ORAL at 21:02

## 2022-06-01 RX ADMIN — HYDROCODONE BITARTRATE AND ACETAMINOPHEN 1 TABLET: 7.5; 325 TABLET ORAL at 20:08

## 2022-06-01 RX ADMIN — INSULIN ASPART 6 UNITS: 100 INJECTION, SOLUTION INTRAVENOUS; SUBCUTANEOUS at 18:50

## 2022-06-01 RX ADMIN — Medication 5000 UNITS: at 08:32

## 2022-06-01 RX ADMIN — FOLIC ACID 1000 MCG: 1 TABLET ORAL at 08:33

## 2022-06-01 RX ADMIN — INSULIN DETEMIR 24 UNITS: 100 INJECTION, SOLUTION SUBCUTANEOUS at 20:52

## 2022-06-01 RX ADMIN — RANOLAZINE 500 MG: 500 TABLET, FILM COATED, EXTENDED RELEASE ORAL at 20:08

## 2022-06-01 RX ADMIN — MIDODRINE HYDROCHLORIDE 5 MG: 5 TABLET ORAL at 00:33

## 2022-06-01 RX ADMIN — ALBUMIN (HUMAN) 25 G: 0.25 INJECTION, SOLUTION INTRAVENOUS at 14:32

## 2022-06-01 RX ADMIN — MONTELUKAST 10 MG: 10 TABLET, FILM COATED ORAL at 20:08

## 2022-06-01 RX ADMIN — ATORVASTATIN CALCIUM 80 MG: 40 TABLET, FILM COATED ORAL at 20:08

## 2022-06-01 RX ADMIN — GABAPENTIN 400 MG: 400 CAPSULE ORAL at 20:08

## 2022-06-01 RX ADMIN — Medication 10 ML: at 21:06

## 2022-06-01 RX ADMIN — Medication 10 ML: at 03:02

## 2022-06-01 NOTE — PLAN OF CARE
Problem: Adult Inpatient Plan of Care  Goal: Plan of Care Review  Outcome: Ongoing, Progressing  Flowsheets (Taken 6/1/2022 1707)  Progress: improving  Plan of Care Reviewed With: patient   Goal Outcome Evaluation:  Plan of Care Reviewed With: patient        Progress: improving

## 2022-06-01 NOTE — PROGRESS NOTES
[facility]  INPATIENT PROGRESS NOTE    Length of Stay: 0  Date of Admission: 5/31/2022  Primary Care Physician: Marty, BEBE Luu    Subjective   Chief Complaint: No new complaints.    HPI: Patient is seen for follow-up.  62-year-old female with history of diabetes mellitus, depressive disorder, hypertension, coronary artery disease, chronic pain syndrome, GERD, chronic kidney disease, morbid obesity who was admitted for altered mental status, dysarthria, CHON and hypotension.    She is doing better this morning, able to independently feed herself, her mental status back to baseline and she voices no new complaints.  Blood pressure remains low.    Review of Systems   Constitutional: Positive for activity change and fatigue. Negative for appetite change, chills, diaphoresis and fever.   HENT: Negative for trouble swallowing and voice change.    Eyes: Negative for photophobia and visual disturbance.   Respiratory: Negative for cough, choking, chest tightness, shortness of breath, wheezing and stridor.    Cardiovascular: Negative for chest pain, palpitations and leg swelling.   Gastrointestinal: Negative for abdominal distention, abdominal pain, blood in stool, constipation, diarrhea, nausea and vomiting.   Endocrine: Negative for cold intolerance, heat intolerance, polydipsia, polyphagia and polyuria.   Genitourinary: Negative for decreased urine volume, difficulty urinating, dysuria, enuresis, flank pain, frequency, hematuria and urgency.   Musculoskeletal: Negative for arthralgias, gait problem, myalgias, neck pain and neck stiffness.   Skin: Negative for pallor, rash and wound.   Neurological: Negative for dizziness, tremors, seizures, syncope, facial asymmetry, speech difficulty, weakness, light-headedness, numbness and headaches.   Hematological: Does not bruise/bleed easily.   Psychiatric/Behavioral: Negative for agitation, behavioral problems and  confusion.       Objective    Temp:  [97.5 °F (36.4 °C)-97.9 °F (36.6 °C)] 97.9 °F (36.6 °C)  Heart Rate:  [63-74] 68  Resp:  [18-20] 18  BP: ()/(43-66) 85/61      Physical Exam  Vitals and nursing note reviewed.   Constitutional:       General: She is not in acute distress.     Appearance: She is well-developed. She is morbidly obese. She is not diaphoretic.   HENT:      Head: Normocephalic and atraumatic.      Right Ear: External ear normal.      Left Ear: External ear normal.      Nose: Nose normal.   Eyes:      Extraocular Movements: Extraocular movements intact.      Conjunctiva/sclera: Conjunctivae normal.      Pupils: Pupils are equal, round, and reactive to light.   Neck:      Thyroid: No thyromegaly.      Vascular: No JVD.   Cardiovascular:      Rate and Rhythm: Normal rate and regular rhythm.      Heart sounds: Normal heart sounds. No murmur heard.    No friction rub. No gallop.   Pulmonary:      Effort: Pulmonary effort is normal. No respiratory distress.      Breath sounds: Normal breath sounds. No stridor. No wheezing or rales.   Chest:      Chest wall: No tenderness.   Abdominal:      General: Bowel sounds are normal. There is no distension.      Palpations: Abdomen is soft. There is no mass.      Tenderness: There is no abdominal tenderness. There is no right CVA tenderness, left CVA tenderness, guarding or rebound.      Hernia: No hernia is present.   Musculoskeletal:         General: No swelling, tenderness or deformity. Normal range of motion.      Cervical back: Normal range of motion and neck supple.      Right lower leg: No edema.      Left lower leg: No edema.   Skin:     General: Skin is warm and dry.      Coloration: Skin is not jaundiced or pale.      Findings: No bruising, erythema, lesion or rash.   Neurological:      General: No focal deficit present.      Mental Status: She is alert and oriented to person, place, and time.      Cranial Nerves: No cranial nerve deficit.       Sensory: No sensory deficit.      Motor: No weakness.      Coordination: Coordination normal.      Gait: Gait normal.      Deep Tendon Reflexes: Reflexes are normal and symmetric. Reflexes normal.   Psychiatric:         Mood and Affect: Mood normal.         Behavior: Behavior normal. Behavior is cooperative.         Thought Content: Thought content normal.         Judgment: Judgment normal.           Medication Review:    Current Facility-Administered Medications:   •  acetaminophen (TYLENOL) tablet 650 mg, 650 mg, Oral, Q4H PRN, 650 mg at 05/31/22 1957 **OR** acetaminophen (TYLENOL) suppository 650 mg, 650 mg, Rectal, Q4H PRN, Fernando Noriega MD  •  aluminum-magnesium hydroxide-simethicone (MAALOX MAX) 400-400-40 MG/5ML suspension 7.5 mL, 7.5 mL, Oral, Q4H PRN, Fernando Noriega MD, 7.5 mL at 05/31/22 2029  •  aspirin tablet 325 mg, 325 mg, Oral, Daily, 325 mg at 06/01/22 0833 **OR** aspirin suppository 300 mg, 300 mg, Rectal, Daily, Fernando Noriega MD  •  atorvastatin (LIPITOR) tablet 80 mg, 80 mg, Oral, Nightly, Fernando Noriega MD, 80 mg at 05/31/22 2103  •  bisacodyl (DULCOLAX) suppository 10 mg, 10 mg, Rectal, Daily PRN, Fernando Noriega MD  •  cholecalciferol (VITAMIN D3) tablet 5,000 Units, 5,000 Units, Oral, Daily, Neerja Dan MD, 5,000 Units at 06/01/22 0832  •  cyanocobalamin injection 1,000 mcg, 1,000 mcg, Intramuscular, Q28 Days, Neeraj Dan MD, 1,000 mcg at 05/31/22 2348  •  Enoxaparin Sodium (LOVENOX) syringe 30 mg, 30 mg, Subcutaneous, Q24H, Neeraj Dan MD, 30 mg at 05/31/22 2348  •  famotidine (PEPCID) tablet 20 mg, 20 mg, Oral, BID PRN, Neeraj Dan MD  •  folic acid (FOLVITE) tablet 1,000 mcg, 1,000 mcg, Oral, Daily, Neeraj Dan MD, 1,000 mcg at 06/01/22 0833  •  gabapentin (NEURONTIN) capsule 400 mg, 400 mg, Oral, Q12H, Neeraj Dan MD, 400 mg at 06/01/22 0834  •  HYDROcodone-acetaminophen (NORCO) 7.5-325 MG per  tablet 1 tablet, 1 tablet, Oral, Q6H PRN, Neeraj Dan MD  •  insulin detemir (LEVEMIR) injection 24 Units, 24 Units, Subcutaneous, Nightly, Neeraj Dan MD  •  isosorbide mononitrate (IMDUR) 24 hr tablet 30 mg, 30 mg, Oral, Q24H, Neeraj Dan MD, 30 mg at 06/01/22 0833  •  LORazepam (ATIVAN) tablet 0.5 mg, 0.5 mg, Oral, Q8H, Neeraj Dan MD, 0.5 mg at 05/31/22 2300  •  meclizine (ANTIVERT) tablet 25 mg, 25 mg, Oral, TID PRN, Neeraj Dan MD  •  methocarbamol (ROBAXIN) tablet 500 mg, 500 mg, Oral, BID PRN, Neeraj Dan MD  •  metoprolol succinate XL (TOPROL-XL) 24 hr tablet 50 mg, 50 mg, Oral, Daily, Neeraj Dan MD  •  montelukast (SINGULAIR) tablet 10 mg, 10 mg, Oral, Nightly, Neeraj Dna MD, 10 mg at 05/31/22 2348  •  nitroglycerin (NITROSTAT) SL tablet 0.4 mg, 0.4 mg, Sublingual, Q5 Min PRN, Neeraj Dan MD  •  ondansetron (ZOFRAN) injection 4 mg, 4 mg, Intravenous, Q6H PRN, Fernando Noriega MD, 4 mg at 05/31/22 1918  •  ondansetron (ZOFRAN) tablet 8 mg, 8 mg, Oral, Q8H PRN, Neeraj Dan MD  •  pantoprazole (PROTONIX) EC tablet 40 mg, 40 mg, Oral, Daily, Neeraj Dan MD, 40 mg at 06/01/22 0834  •  ranolazine (RANEXA) 12 hr tablet 500 mg, 500 mg, Oral, Q12H, Neeraj Dan MD, 500 mg at 06/01/22 0833  •  sodium chloride 0.9 % flush 10 mL, 10 mL, Intravenous, Q12H, Fernando Noriega MD  •  sodium chloride 0.9 % flush 10 mL, 10 mL, Intravenous, PRN, Fernando Noriega MD  •  sodium chloride 0.9 % flush 10 mL, 10 mL, Intravenous, Q12H, Neeraj Dan MD, 10 mL at 05/31/22 2340  •  sodium chloride 0.9 % flush 10 mL, 10 mL, Intravenous, PRN, Neeraj Dan MD, 10 mL at 06/01/22 0302  •  sodium chloride 0.9% - IBW for BMI > 30 bolus 1,917 mL, 30 mL/kg (Ideal), Intravenous, Once, Jordy Godfrey MD, Held at 05/31/22 1912    Current Outpatient Medications:   •  ARIPiprazole  (ABILIFY) 15 MG tablet, TAKE 1 TABLET BY MOUTH EVERY DAY, Disp: 90 tablet, Rfl: 1  •  aspirin  MG tablet, Take 1 tablet by mouth Daily., Disp: 30 tablet, Rfl: 0  •  atorvastatin (LIPITOR) 80 MG tablet, TAKE 1 TABLET BY MOUTH EVERY DAY, Disp: 90 tablet, Rfl: 1  •  BD SHARPS CONTAINER HOME misc, 1 each Take As Directed., Disp: 1 each, Rfl: 0  •  Blood Glucose Monitoring Suppl (ONE TOUCH ULTRA MINI) w/Device kit, Patient will need the Accu-Check Avitio meter, Disp: 1 each, Rfl: 0  •  Calcium Citrate-Vitamin D 250-200 MG-UNIT tablet, Take 2 tablets by mouth 2 (two) times a day., Disp: , Rfl:   •  clopidogrel (PLAVIX) 75 MG tablet, Take 1 tablet by mouth Daily., Disp: 90 tablet, Rfl: 1  •  cyanocobalamin 1000 MCG/ML injection, INJECT 1 ML INTO THE APPROPRIATE MUSCLE AS DIRECTED BY PRESCRIBER EVERY 28 (TWENTY-EIGHT) DAYS., Disp: 3 mL, Rfl: 2  •  famotidine (Pepcid) 20 MG tablet, Take 1 tablet by mouth 2 (Two) Times a Day As Needed for Heartburn or Indigestion., Disp: 60 tablet, Rfl: 0  •  folic acid (FOLVITE) 1 MG tablet, TAKE 1 TABLET BY MOUTH EVERY DAY, Disp: 90 tablet, Rfl: 1  •  furosemide (LASIX) 40 MG tablet, TAKE 1 TABLET BY MOUTH EVERY DAY, Disp: 90 tablet, Rfl: 1  •  gabapentin (NEURONTIN) 400 MG capsule, Take 1 capsule by mouth 3 (Three) Times a Day., Disp: 90 capsule, Rfl: 2  •  glucose blood (Accu-Chek Guide) test strip, 1 each by Other route 4 (Four) Times a Day. To test blood sugar 4X daily. For  Dx E11.65, Disp: 600 each, Rfl: 1  •  glucose monitor monitoring kit, 1 each Daily. accucheck eve meter, E11.9, Disp: 1 each, Rfl: 0  •  hydroCHLOROthiazide (HYDRODIURIL) 25 MG tablet, TAKE 1 TABLET BY MOUTH EVERY DAY, Disp: 90 tablet, Rfl: 0  •  HYDROcodone-acetaminophen (NORCO) 7.5-325 MG per tablet, Take 1 tablet by mouth Every 6 (Six) Hours As Needed for Moderate Pain ., Disp: 120 tablet, Rfl: 0  •  insulin aspart (NovoLOG FlexPen) 100 UNIT/ML solution pen-injector sc pen, INJECT 0 TO 14 UNITS UNDER THE  "SKIN 3 TIMES A DAY BEFORE MEALS ACCORDING TO SLIDING SCALE, Disp: 5 pen, Rfl: 5  •  Insulin Glargine (BASAGLAR KWIKPEN) 100 UNIT/ML injection pen, INJECT 24 UNITS SUBCUTANEOUSLY AT BEDTIME, Disp: 1 pen, Rfl: 7  •  Insulin Pen Needle (B-D ULTRAFINE III SHORT PEN) 31G X 8 MM misc, USE TO INJECT 5 TIMES A DAY, Disp: 150 each, Rfl: 11  •  Insulin Pen Needle 31G X 8 MM misc, Use up to 4 (four) times daily with insulin as directed., Disp: 100 each, Rfl: 0  •  isosorbide mononitrate (IMDUR) 30 MG 24 hr tablet, Take 1 tablet by mouth Daily., Disp: 30 tablet, Rfl: 3  •  Lancets (accu-chek soft touch) lancets, As directed, Disp: 100 each, Rfl: 12  •  lisinopril (PRINIVIL,ZESTRIL) 20 MG tablet, TAKE 1 TABLET BY MOUTH EVERY DAY, Disp: 90 tablet, Rfl: 1  •  LORazepam (ATIVAN) 0.5 MG tablet, Take 1 tablet by mouth Every 8 (Eight) Hours., Disp: 90 tablet, Rfl: 0  •  meclizine (ANTIVERT) 25 MG tablet, Take 1 tablet by mouth 3 (Three) Times a Day As Needed for dizziness., Disp: 90 tablet, Rfl: 6  •  methocarbamol (ROBAXIN) 500 MG tablet, TAKE 1 TABLET BY MOUTH 2 (TWO) TIMES A DAY AS NEEDED FOR MUSCLE SPASMS., Disp: 60 tablet, Rfl: 5  •  metoclopramide (REGLAN) 10 MG tablet, TAKE 1 TABLET BY MOUTH 4 (FOUR) TIMES A DAY AS NEEDED (NAUSEA)., Disp: 120 tablet, Rfl: 1  •  metoprolol succinate XL (TOPROL-XL) 50 MG 24 hr tablet, TAKE 1 TABLET BY MOUTH DAILY. DOSE INCREASED 5/24/21, Disp: 90 tablet, Rfl: 1  •  mirtazapine (REMERON) 30 MG tablet, TAKE 1 TABLET BY MOUTH EVERY DAY AT NIGHT, Disp: 90 tablet, Rfl: 1  •  montelukast (SINGULAIR) 10 MG tablet, Take 1 tablet by mouth Every Night. To help with allergies causing runny nose, Disp: 30 tablet, Rfl: 5  •  naloxone (NARCAN) 0.4 MG/ML injection, Infuse 0.5 mL into a venous catheter Every 5 (Five) Minutes As Needed for Opioid Reversal., Disp: , Rfl:   •  Needle, Disp, (Easy Touch FlipLock Needles) 25G X 1-1/2\" misc, 1 each Every 28 (Twenty-Eight) Days. For administering B12 cyanocobalomin. Dx " "vitamin B12 deficiency., Disp: 10 each, Rfl: 3  •  Needle, Disp, (Hypodermic Needle) 20G X 1\" misc, 1 each Every 28 (Twenty-Eight) Days. For drawing up B12 for injection monthly, change back to smaller gauge needle prior to injection. Dx Vitamin B12  Deficiency., Disp: 10 each, Rfl: 2  •  nitroglycerin (NITROSTAT) 0.4 MG SL tablet, Place 1 tablet under the tongue Every 5 (Five) Minutes As Needed for Chest Pain. Take no more than 3 doses in 15 minutes., Disp: 20 tablet, Rfl: 11  •  NovoLOG 100 UNIT/ML injection, INJECT 8 UNITS SUBCUTANEOUSLY 3 TIMES DAILY WITH MEALS., Disp: 20 mL, Rfl: 3  •  ondansetron (ZOFRAN) 8 MG tablet, TAKE 1 TABLET BY MOUTH EVERY 8 (EIGHT) HOURS AS NEEDED FOR NAUSEA OR VOMITING., Disp: 18 tablet, Rfl: 1  •  pantoprazole (PROTONIX) 20 MG EC tablet, Take 2 tablets by mouth Daily., Disp: 90 tablet, Rfl: 3  •  ranolazine (RANEXA) 500 MG 12 hr tablet, Take 1 tablet by mouth Every 12 (Twelve) Hours., Disp: 60 tablet, Rfl: 3  •  Syringe 20G X 1-1/2\" 3 ML misc, 1 each Every 28 (Twenty-Eight) Days. For injection cyanocobalomin, Dx vit B12 deficiency., Disp: 10 each, Rfl: 2  •  venlafaxine XR (EFFEXOR-XR) 150 MG 24 hr capsule, Take 1 capsule by mouth 2 (Two) Times a Day., Disp: 90 capsule, Rfl: 1  •  vitamin D (ERGOCALCIFEROL) 1.25 MG (47853 UT) capsule capsule, TAKE ONE CAPSULE BY MOUTH EVERY SUNDAY., Disp: 36 capsule, Rfl: 1    Results Review:  I have reviewed the labs, radiology results, and diagnostic studies.    Laboratory Data:   Results from last 7 days   Lab Units 05/31/22  1849 05/31/22  0922   SODIUM mmol/L 130* 132*   POTASSIUM mmol/L 5.1 5.2   CHLORIDE mmol/L 95* 96*   CO2 mmol/L 23.0 20.0*   BUN mg/dL 34* 27*   CREATININE mg/dL 2.86* 2.05*   GLUCOSE mg/dL 432* 203*   CALCIUM mg/dL 8.7 9.0   BILIRUBIN mg/dL 0.2 0.3   ALK PHOS U/L 130* 141*   ALT (SGPT) U/L 13 16   AST (SGOT) U/L 11 16   ANION GAP mmol/L 12.0 16.0*     Estimated Creatinine Clearance: 28.2 mL/min (A) (by C-G formula based on " SCr of 2.86 mg/dL (H)).  Results from last 7 days   Lab Units 05/31/22  1849   MAGNESIUM mg/dL 1.8         Results from last 7 days   Lab Units 06/01/22  0631 05/31/22  1849 05/31/22  0924   WBC 10*3/mm3 14.22* 10.84* 13.95*   HEMOGLOBIN g/dL 11.4* 11.5* 12.8   HEMATOCRIT % 34.5 34.7 38.3   PLATELETS 10*3/mm3 298 381 356     Results from last 7 days   Lab Units 05/31/22  1849   INR  0.92       Culture Data:   No results found for: BLOODCX  No results found for: URINECX  No results found for: RESPCX  No results found for: WOUNDCX  No results found for: STOOLCX  No components found for: BODYFLD    Radiology Data:   Imaging Results (Last 24 Hours)     Procedure Component Value Units Date/Time    CT Head Without Contrast [[id number]] Collected: 06/01/22 1032     Updated: 06/01/22 1102    Narrative:      COMPARISON:     5/31/2022    INDICATION: Stroke follow-up    TECHNIQUE: CT of the head was obtained from the vertex through  the skull base.  Reformats are obtained.    All CT scans at this location are performed using dose modulation  techniques as appropriate to a performed exam including the  following: automated exposure control; adjustment of the mA  and/or kV according to patient size (this includes techniques or  standardized protocols for targeted exams where dose is matched  to indication / reason for exam; i.e. extremities or head); use  of iterative reconstruction technique.    FINDINGS: There is prominence of ventricles and sulci consistent  with atrophy. There is hypoattenuation of the periventricular  white matter, consistent with small vessel disease. There is no  mass, mass effect, or midline shift.  There is no abnormal  intra-axial or extra-axial fluid collection or hematoma.  There  is preservation of normal gray-white differentiation.   Atherosclerotic calcification of intracranial arteries. The bony  calvarium is intact.  Hyperostosis frontalis. The visualized  paranasal sinuses and mastoid air cells  appear well-aerated.      Impression:      1. No acute intracranial abnormality.    2. Atrophy.  3. Small vessel ischemic disease.    Electronically signed by:  Pranav Chandler MD  6/1/2022 11:00 AM CDT  Workstation: [id number]    CT Head Without Contrast Stroke Protocol [[id number]] Collected: 05/31/22 1842     Updated: 05/31/22 1927    Addenda:         ADDENDUM   ADDENDUM #1       Critical findings were communicated to  at 7:08 PM on  5/31/2022.     Electronically signed by:  Herberth Foss MD  5/31/2022 7:25 PM CDT  Workstation: [id number]    Signed: 05/31/22 1925 by Herberth Foss MD    Narrative:      INDICATION: Neuro deficit, acute, stroke suspected    EXAMINATION: CT BRAIN - CT HEAD WITHOUT IV CONTRAST    TECHNIQUE:    Multiple axial images were obtained of the head without  intravenous contrast. A radiation dose optimization technique was  used for this scan.  IV Contrast dosage and agent: None.    COMPARISON: None  ____________________________________________    FINDINGS:      BRAIN PARENCHYMA:   No intra- or extra-axial hemorrhage. Grey white differentiation  is preserved. No intracranial mass or mass effect. Posterior  fossa structures are unremarkable.     CSF SPACES: Appropriate for age.  No hydrocephalus.      CALVARIUM, SKULL BASE, PARANASAL SINUSES AND MASTOID AIR CELLS:  Unremarkable     Aspects score: 10      Impression:        No acute intracranial process is identified.    Electronically signed by:  Herberth Foss MD  5/31/2022 6:57 PM CDT  Workstation: [id number]    XR Chest 1 View [[id number]] Collected: 05/31/22 1900     Updated: 05/31/22 1919    Narrative:      XR CHEST 1 VIEW    COMPARISONS: Single view chest dated April 4, 2022    ADDITIONAL PERTINENT HISTORY: TIA    FINDINGS:  Cardiomediastinal silhouette:  Moderate cardiomegaly. Patient  status post median sternotomy.    Pulmonary vasculature:  Negative.    Lung fields:  Marked elevation of the right hemidiaphragm.  Minimal bibasilar regions of  scarring.    Pleural spaces: Negative.    Osseous structures:  Negative.    Surrounding soft tissues:  Surgical clips involving the left  upper quadrant.        Impression:      1. Continued cardiomegaly.  2. Continued elevation of the right hemidiaphragm.  3. No acute cardiopulmonary disease.    Electronically signed by:  Miguel Kyle MD  5/31/2022 7:17 PM CDT  Workstation: [id number]          ABG:        I have reviewed the patient's current medications.     Assessment/Plan     Hospital Problem List:  Active Problems:    Weakness    Hypotension    CHON (acute kidney injury) (HCC)    Confusion    Acute metabolic encephalopathy with dysarthria (likely due to a combination of polypharmacy and acute kidney injury): Resolved as mental status is back to baseline and speech is back to normal. Non Contrast CT scan of the head did not show any acute changes and MRI could not be done due to patient's bladder stimulator.  Continue aspirin and high intensity statin.  Repeat noncontrast CT scan of the head is pending.  Input by neurologist is appreciated.    Acute on chronic kidney disease (likely prerenal): Patient's baseline creatinine is between 1.2 to 1.6.  Continue IV hydration, avoid nephrotoxins, monitor renal function and consult nephrologist if the need arises.  Urinalysis was unremarkable.    Hypotension (likely medication induced/secondary to dehydration): Continue IV resuscitation, give a dose of albumin and monitor hemodynamics.  She may need vasopressors if the need arises.    Diabetes mellitus (with hyperglycemia): Blood sugar control has improved.  Continue basal insulin with Accu-Cheks and sliding scale insulin.  Pseudohyponatremia is reactive and will be monitored.    Coronary artery disease: Continue statin, Ranexa, nitrates and antiplatelet therapy.  Lopressor is to remain on hold secondary to hypotension.      Deconditioning: Continue PT and OT.    Continue PPI for GERD/DVT prophylaxis with Lovenox.    I  confirmed that the patient's Advance Care Plan is present, code status is documented, or surrogate decision maker is listed in the patient's medical record.     I have utilized all available immediate resources to obtain, update, or review the patient's current medications    Approximately 35 minutes of critical care time were spent managing the patient exclusive of billable procedures.     Discharge Planning: In progress.    Mat Peng MD   06/01/22   12:11 CDT

## 2022-06-01 NOTE — PLAN OF CARE
Goal Outcome Evaluation:  Plan of Care Reviewed With: patient           Outcome Evaluation: initial OT evaluation complete. co-eval with PT. pt was pleasant and cooperative throughout. BUE ROM WFL Grossly. BUE strength and bilateral  strength are grossly 4/5. pt is a L BKA, has prosthesis, but it is not here with her. pt was min A for sup to sit bed mobility. min A for sit to stand transfer with use of RW. min A x 2 for squat/stand pivot transfer to C with use of RW. required total assistance for toileting hygiene. returned to bed at end of session with CGA. recommend further skilled OT services to address functional mobility and ADL deficits, as well as balance, strength, and endurance. recommend home with assistance and home health OT at discharge. goals established.

## 2022-06-01 NOTE — THERAPY EVALUATION
Patient Name: Ariana Martinez  : 1960    MRN: [id number]                              Today's Date: 2022       Admit Date: 2022    Visit Dx:     ICD-10-CM ICD-9-CM   1. Hypotension, unspecified hypotension type  I95.9 458.9   2. Stroke-like symptom  R29.90 781.99   3. Dysarthria  R47.1 784.51   4. Impaired mobility and ADLs  Z74.09 V49.89    Z78.9      Patient Active Problem List   Diagnosis   • 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, with long-term current use of insulin   • Closed nondisplaced fracture of fifth left metatarsal bone   • MAURICIO (generalized anxiety disorder)   • Depression, major, recurrent, moderate (HCC)   • GERD without esophagitis   • Long term prescription opiate use   • Mixed hyperlipidemia   • Vitamin D deficiency   • Seasonal allergic rhinitis   • Restrictive lung disease secondary to obesity   • Adult body mass index 37.0-37.9   • Snoring   • Class 3 severe obesity due to excess calories without serious comorbidity with body mass index (BMI) of 40.0 to 44.9 in adult (HCC)   • (HFpEF) heart failure with preserved ejection fraction ([facility])   • Type 2 diabetes mellitus with hyperglycemia, with long-term current use of insulin ([facility])   • Cyanocobalamin deficiency   • Weakness   • Coronary artery disease of native artery of native heart with stable angina pectoris ([facility])   • Hypertension   • Meniere's disease   • Gastroparesis   • Otitis media with effusion, left   • Pulmonary hypertension ([facility])   • Displaced fracture of fifth metatarsal bone, left foot, subsequent encounter for fracture with nonunion   • Pes cavus   • Primary osteoarthritis involving multiple joints   • Generalized anxiety disorder   • Chronic right-sided low back pain with right-sided sciatica   • Chest pain   • Thrombocytosis   • Leukocytosis   • Iron deficiency   • Adverse effect of iron   • Hindfoot varus, acquired, left   • Chest pain due to myocardial ischemia   • Nonrheumatic  tricuspid valve regurgitation   • Iron deficiency anemia due to chronic blood loss   • Malaise and fatigue   • Nonunion of subtalar arthrodesis   • Ankle arthritis   • Cellulitis of left lower extremity   • Urinary retention   • Acute bacterial endocarditis   • Staphylococcus aureus bacteremia   • Infection due to Acinetobacter species   • Endocarditis   • Left foot infection   • Uncontrolled hypertension   • Occlusion and stenosis of bilateral carotid arteries   • S/P BKA (below knee amputation) unilateral, left ([facility])   • Phantom pain after amputation of lower extremity ([facility])   • S/P CABG (coronary artery bypass graft)   • Acute colitis   • Severe malnutrition ([facility])   • Sepsis ([facility])   • TIA (transient ischemic attack)   • Chest pain, unspecified type   • Coronary artery abnormality   • Hypotension   • CHON (acute kidney injury) ([facility])   • Confusion     Past Medical History:   Diagnosis Date   • Angina, class IV ([facility])    • Anxiety    • Anxiety and depression    • Arthritis    • Benign paroxysmal positional vertigo    • Bladder disorder     has bladder stimulator   • Carpal tunnel syndrome    • CHF (congestive heart failure) ([facility])    • Chronic pain    • Coronary atherosclerosis     hx CABG 2005.  is followed by Dr Kwon   • Depression    • Diabetes mellitus ([facility])     Type 2, controlled   • Diabetic polyneuropathy ([facility])    • Disease of thyroid gland    • Elevated cholesterol    • Female stress incontinence    • Foot pain, left    • Full dentures    • Gastroparesis    • GERD (gastroesophageal reflux disease)    • Hyperlipidemia    • Hypertension    • Low back pain    • Malaise and fatigue    • Multiple joint pain    • Obesity     Refuses to be weighed   • Occlusion and stenosis of bilateral carotid arteries 6/18/2021   • Otalgia     Both   • Perforation of tympanic membrane     Left   • Postoperative wound infection    • Vitamin D deficiency    • Wears glasses     reading     Past Surgical History:   Procedure  Laterality Date   • ABDOMINAL SURGERY     • AMPUTATION FOOT     • ANGIOPLASTY      coronary   • ANKLE ARTHROSCOPY Left 2/26/2021    Procedure: Left foot hardware removal, ankle arthroscopy, bone grafting , left foot exostectomy;  Surgeon: Ignacio Lord DPM;  Location: [facility] OR;  Service: Podiatry;  Laterality: Left;   • BREAST BIOPSY Right    • CARDIAC CATHETERIZATION     • CARDIAC CATHETERIZATION N/A 6/20/2017    Procedure: Right Heart Cath;  Surgeon: Can Kwon MD PhD;  Location: [facility] CATH INVASIVE LOCATION;  Service:    • CARDIAC CATHETERIZATION N/A 2/18/2020    Procedure: Left Heart Cath;  Surgeon: Catalina Cooper MD;  Location: [facility] CATH INVASIVE LOCATION;  Service: Cardiology;  Laterality: N/A;   • CARDIAC CATHETERIZATION N/A 4/6/2022    Procedure: Left Heart Cath;  Surgeon: Sheryl Navas MD;  Location: [facility] CATH INVASIVE LOCATION;  Service: Cardiology;  Laterality: N/A;   • CARPAL TUNNEL RELEASE     • CHOLECYSTECTOMY     • COLONOSCOPY N/A 6/24/2020    Procedure: COLONOSCOPY;  Surgeon: Julián Maldonado MD;  Location: [facility] ENDOSCOPY;  Service: Gastroenterology;  Laterality: N/A;   • CORONARY ARTERY BYPASS GRAFT  2005   • ENDOSCOPY N/A 10/19/2018    Procedure: ESOPHAGOGASTRODUODENOSCOPY possible dilation;  Surgeon: Julián Maldonado MD;  Location: [facility] ENDOSCOPY;  Service: Gastroenterology   • ENDOSCOPY N/A 6/24/2020    Procedure: ESOPHAGOGASTRODUODENOSCOPY WED appt please;  Surgeon: Julián Maldonado MD;  Location: [facility] ENDOSCOPY;  Service: Gastroenterology;  Laterality: N/A;   • ENDOSCOPY AND COLONOSCOPY     • FOOT SURGERY      Toes   • FOOT SURGERY     • GASTRIC BANDING      Revision, laparoscopic   • HYSTERECTOMY     • INCISION AND DRAINAGE LEG Left 3/12/2021    Procedure: Left ankle arthroscopic irrigation and debridement, screw removal;  Surgeon: Ignacio Lord DPM;  Location: [facility] OR;  Service: Podiatry;  Laterality: Left;   • MOUTH SURGERY     • SALPINGO  OOPHORECTOMY     • SHOULDER SURGERY     • SUBTALAR ARTHRODESIS Left 1/16/2019    Procedure: LEFT FOOT HARDWARE REMOVAL, FIFTH METATARSAL , OPEN REDUCTION INTERNAL FIXATION, CALCANEAL OSTEOTOMY;  Surgeon: Ignacio Lord DPM;  Location: [facility];  Service: Podiatry   • SUBTALAR ARTHRODESIS Left 10/16/2019    Procedure: foot hardware removal, subtalar joint fusion  possible de/reattachment of achilles tendon        (c-arm);  Surgeon: Ignacio Lord DPM;  Location: [facility] OR;  Service: Podiatry   • SUBTALAR ARTHRODESIS Left 9/30/2020    Procedure: subtalar, talonavicular joint arthrodesis.  Removal hardware.          (c-arm);  Surgeon: Ignacio Lord DPM;  Location: [facility];  Service: Podiatry;  Laterality: Left;   • TRANSESOPHAGEAL ECHOCARDIOGRAM (LAMONTE)      With color flow      General Information     Row Name 06/01/22 1434          OT Time and Intention    Document Type evaluation  -ME     Mode of Treatment co-treatment;occupational therapy;physical therapy  -ME     Row Name 06/01/22 1435          General Information    Patient Profile Reviewed yes  -ME     Prior Level of Function independent:;all household mobility;w/c or scooter;ADL's   stays in room with pt while she is in the shower, but is able to complete bathing tasks on her own  -ME     Existing Precautions/Restrictions fall  -ME     Barriers to Rehab previous functional deficit  -ME     Row Name 06/01/22 1434          Living Environment    People in Home spouse  -ME     Row Name 06/01/22 1434          Home Main Entrance    Number of Stairs, Main Entrance none  -ME     Row Name 06/01/22 1434          Stairs Within Home, Primary    Stairs, Within Home, Primary uses prosthesis daily in and out of the house using FWW; has a BSC; uses regular toilet with bilateral hand rails; walk in shower with a shower chair; has a ramp to enter  -ME     Stairs Comment, Within Home, Primary no steps inside the home  -ME     Row Name 06/01/22 5826           Cognition    Orientation Status (Cognition) oriented x 4  -ME     Row Name 06/01/22 1434          Safety Issues, Functional Mobility    Comment, Safety Issues/Impairments (Mobility) L [place] memorial day 2021, has had prosthesis since November 2021  -ME           User Key  (r) = Recorded By, (t) = Taken By, (c) = Cosigned By    Initials Name Provider Type    ME Severiano Pettit OTR/L Occupational Therapist                 Mobility/ADL's     Row Name 06/01/22 1434          Bed Mobility    Bed Mobility supine-sit;sit-supine  -ME     Supine-Sit [place] (Bed Mobility) minimum assist (75% patient effort)  -ME     Sit-Supine [place] (Bed Mobility) contact guard  -ME     Assistive Device (Bed Mobility) bed rails;head of bed elevated  -ME     Row Name 06/01/22 1434          Transfers    Transfers sit-stand transfer;toilet transfer  -ME     Sit-Stand [place] (Transfers) minimum assist (75% patient effort)  -ME     [place] Level (Toilet Transfer) minimum assist (75% patient effort);2 person assist  -ME     Assistive Device (Toilet Transfer) commode, bedside without drop arms;walker, front-wheeled  -ME     Row Name 06/01/22 1434          Sit-Stand Transfer    Assistive Device (Sit-Stand Transfers) walker, front-wheeled  -ME     Row Name 06/01/22 1434          Toilet Transfer    Type (Toilet Transfer) stand pivot/stand step  -ME     Row Name 06/01/22 1434          Activities of Daily Living    BADL Assessment/Intervention toileting  -ME     Row Name 06/01/22 1434          Toileting Assessment/Training    [place] Level (Toileting) dependent (less than 25% patient effort)  -ME     Assistive Devices (Toileting) commode, bedside without drop arms  -ME           User Key  (r) = Recorded By, (t) = Taken By, (c) = Cosigned By    Initials Name Provider Type    ME Severiano Pettit OTR/L Occupational Therapist               Obj/Interventions     Row Name 06/01/22 1434          Sensory Assessment (Somatosensory)     Sensory Assessment (Somatosensory) UE sensation intact  -ME     Row Name 06/01/22 1434          Range of Motion Comprehensive    General Range of Motion no range of motion deficits identified;bilateral upper extremity ROM WFL  -ME     Row Name 06/01/22 1434          Strength Comprehensive (MMT)    General Manual Muscle Testing (MMT) Assessment other (see comments)  -ME     Comment, General Manual Muscle Testing (MMT) Assessment BUE strength and bilateral  strength are grossly 4+/5; pt is right handed  -ME           User Key  (r) = Recorded By, (t) = Taken By, (c) = Cosigned By    Initials Name Provider Type    ME Severiano Pettit, OTR/L Occupational Therapist               Goals/Plan     Row Name 06/01/22 1434          Transfer Goal 1 (OT)    Activity/Assistive Device (Transfer Goal 1, OT) toilet  -ME     [place] Level/Cues Needed (Transfer Goal 1, OT) supervision required  -ME     Time Frame (Transfer Goal 1, OT) long term goal (LTG);by discharge  -ME     Strategies/Barriers (Transfers Goal 1, OT) BSC  -ME     Progress/Outcome (Transfer Goal 1, OT) goal not met  -ME     Row Name 06/01/22 1434          Bathing Goal 1 (OT)    Activity/Device (Bathing Goal 1, OT) lower body bathing  -ME     [place] Level/Cues Needed (Bathing Goal 1, OT) contact guard required  -ME     Time Frame (Bathing Goal 1, OT) long term goal (LTG);by discharge  -ME     Progress/Outcomes (Bathing Goal 1, OT) goal not met  -ME     Row Name 06/01/22 1434          Dressing Goal 1 (OT)    Activity/Device (Dressing Goal 1, OT) lower body dressing  -ME     [place]/Cues Needed (Dressing Goal 1, OT) contact guard required  -ME     Time Frame (Dressing Goal 1, OT) long term goal (LTG);by discharge  -ME     Progress/Outcome (Dressing Goal 1, OT) goal not met  -ME     Row Name 06/01/22 1434          Therapy Assessment/Plan (OT)    Planned Therapy Interventions (OT) activity tolerance training;adaptive equipment training;IADL  retraining;functional balance retraining;BADL retraining;occupation/activity based interventions;patient/caregiver education/training;ROM/therapeutic exercise;strengthening exercise;transfer/mobility retraining  -ME           User Key  (r) = Recorded By, (t) = Taken By, (c) = Cosigned By    Initials Name Provider Type    ME Severiano Pettit OTR/L Occupational Therapist               Clinical Impression     [facility] Name 06/01/22 1434          Pain Assessment    Pretreatment Pain Rating 0/10 - no pain  -ME     Posttreatment Pain Rating 0/10 - no pain  -ME     Row Name 06/01/22 1434          Plan of Care Review    Plan of Care Reviewed With patient  -[facility] Name 06/01/22 1434          Therapy Assessment/Plan (OT)    Patient/Family Therapy Goal Statement (OT) to return home  -ME     Rehab Potential (OT) good, to achieve stated therapy goals  -ME     Criteria for Skilled Therapeutic Interventions Met (OT) yes;meets criteria;skilled treatment is necessary  -ME     Therapy Frequency (OT) other (see comments)  3-7 days per week  -ME     Predicted Duration of Therapy Intervention (OT) until d/c or all goals met  -[facility] Name 06/01/22 1434          Therapy Plan Review/Discharge Plan (OT)    Anticipated Discharge Disposition (OT) home with assist;home with home health  -[facility] Name 06/01/22 1434          Vital Signs    Pre Systolic BP Rehab 102  -ME     Pre Treatment Diastolic BP 53  -ME     Post Systolic BP Rehab 148  -ME     Post Treatment Diastolic BP 58  -ME     Pretreatment Heart Rate (beats/min) 72  -ME     Posttreatment Heart Rate (beats/min) 74  -ME     Pre SpO2 (%) 99  -ME     O2 Delivery Pre Treatment room air  -ME     Post SpO2 (%) 99  -ME     O2 Delivery Post Treatment room air  -ME     Pre Patient Position Supine  -ME     Post Patient Position Supine  -[facility] Name 06/01/22 1434          Positioning and Restraints    Pre-Treatment Position in bed  -ME     Post Treatment Position bed  -ME     In Bed  notified nsg;supine;call light within reach;encouraged to call for assist;patient within staff view  -ME           User Key  (r) = Recorded By, (t) = Taken By, (c) = Cosigned By    Initials Name Provider Type    Severiano Cortes OTR/L Occupational Therapist               Outcome Measures     Row Name 06/01/22 1434          How much help from another is currently needed...    Putting on and taking off regular lower body clothing? 2  -ME     Bathing (including washing, rinsing, and drying) 2  -ME     Toileting (which includes using toilet bed pan or urinal) 2  -ME     Putting on and taking off regular upper body clothing 4  -ME     Taking care of personal grooming (such as brushing teeth) 4  -ME     Eating meals 4  -ME     AM-PAC 6 Clicks Score (OT) 18  -ME     Row Name 06/01/22 1434 06/01/22 1433       Functional Assessment    Outcome Measure Options AM-PAC 6 Clicks Daily Activity (OT)  -ME AM-PAC 6 Clicks Basic Mobility (PT)  -LR          User Key  (r) = Recorded By, (t) = Taken By, (c) = Cosigned By    Initials Name Provider Type    Hernando Howe Physical Therapist    Severiano Cortes OTR/L Occupational Therapist                Occupational Therapy Education                 Title: PT OT SLP Therapies (In Progress)     Topic: Occupational Therapy (In Progress)     Point: ADL training (Not Started)     Description:   Instruct learner(s) on proper safety adaptation and remediation techniques during self care or transfers.   Instruct in proper use of assistive devices.              Learner Progress:  Not documented in this visit.          Point: Home exercise program (Not Started)     Description:   Instruct learner(s) on appropriate technique for monitoring, assisting and/or progressing therapeutic exercises/activities.              Learner Progress:  Not documented in this visit.          Point: Precautions (Done)     Description:   Instruct learner(s) on prescribed precautions during self-care and  functional transfers.              Learning Progress Summary           Patient Acceptance, E, VU by ME at 6/1/2022 1526    Comment: Educated on OT and POC. Educated to call for assistance. Educated on safety precautions.                   Point: Body mechanics (Done)     Description:   Instruct learner(s) on proper positioning and spine alignment during self-care, functional mobility activities and/or exercises.              Learning Progress Summary           Patient Acceptance, E, VU by ME at 6/1/2022 1526    Comment: Educated on OT and POC. Educated to call for assistance. Educated on safety precautions.                               User Key     Initials Effective Dates Name Provider Type Discipline    ME 06/16/21 -  Severiano Pettit, OTR/L Occupational Therapist OT              OT Recommendation and Plan  Planned Therapy Interventions (OT): activity tolerance training, adaptive equipment training, IADL retraining, functional balance retraining, BADL retraining, occupation/activity based interventions, patient/caregiver education/training, ROM/therapeutic exercise, strengthening exercise, transfer/mobility retraining  Therapy Frequency (OT): other (see comments) (3-7 days per week)  Plan of Care Review  Plan of Care Reviewed With: patient  Outcome Evaluation: initial OT evaluation complete. co-eval with PT. pt was pleasant and cooperative throughout. BUE ROM WFL Grossly. BUE strength and bilateral  strength are grossly 4/5. pt is a L BKA, has prosthesis, but it is not here with her. pt was min A for sup to sit bed mobility. min A for sit to stand transfer with use of RW. min A x 2 for squat/stand pivot transfer to BSC with use of RW. required total assistance for toileting hygiene. returned to bed at end of session with CGA. recommend further skilled OT services to address functional mobility and ADL deficits, as well as balance, strength, and endurance. recommend home with assistance and home health OT at  discharge. goals established.     Time Calculation:    Time Calculation- OT     Row Name 06/01/22 1531             Time Calculation- OT    OT Start Time 1433  -ME      OT Stop Time 1500  -ME      OT Time Calculation (min) 27 min  -ME      OT Received On 06/01/22  -ME      OT Goal Re-Cert Due Date 06/14/22  -ME              Untimed Charges    OT Eval/Re-eval Minutes 27  -ME              Total Minutes    Untimed Charges Total Minutes 27  -ME       Total Minutes 27  -ME            User Key  (r) = Recorded By, (t) = Taken By, (c) = Cosigned By    Initials Name Provider Type    ME Severiano Pettit, OTR/L Occupational Therapist              Therapy Charges for Today     Code Description Service Date Service Provider Modifiers Qty    07658890610 HC OT EVAL MOD COMPLEXITY 2 6/1/2022 Severiano Pettit OTR/L GO 1               Severiano Pettit OTR/L  6/1/2022

## 2022-06-01 NOTE — THERAPY EVALUATION
Patient Name: Ariana Martinez  : 1960    MRN: [id number]                              Today's Date: 2022       Admit Date: 2022    Visit Dx:     ICD-10-CM ICD-9-CM   1. Hypotension, unspecified hypotension type  I95.9 458.9   2. Stroke-like symptom  R29.90 781.99   3. Dysarthria  R47.1 784.51   4. Impaired mobility and ADLs  Z74.09 V49.89    Z78.9    5. Impaired functional mobility, balance, gait, and endurance  Z74.09 V49.89     Patient Active Problem List   Diagnosis   • 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, with long-term current use of insulin   • Closed nondisplaced fracture of fifth left metatarsal bone   • MAURICIO (generalized anxiety disorder)   • Depression, major, recurrent, moderate (HCC)   • GERD without esophagitis   • Long term prescription opiate use   • Mixed hyperlipidemia   • Vitamin D deficiency   • Seasonal allergic rhinitis   • Restrictive lung disease secondary to obesity   • Adult body mass index 37.0-37.9   • Snoring   • Class 3 severe obesity due to excess calories without serious comorbidity with body mass index (BMI) of 40.0 to 44.9 in adult ([facility])   • (HFpEF) heart failure with preserved ejection fraction ([facility])   • Type 2 diabetes mellitus with hyperglycemia, with long-term current use of insulin ([facility])   • Cyanocobalamin deficiency   • Weakness   • Coronary artery disease of native artery of native heart with stable angina pectoris ([facility])   • Hypertension   • Meniere's disease   • Gastroparesis   • Otitis media with effusion, left   • Pulmonary hypertension ([facility])   • Displaced fracture of fifth metatarsal bone, left foot, subsequent encounter for fracture with nonunion   • Pes cavus   • Primary osteoarthritis involving multiple joints   • Generalized anxiety disorder   • Chronic right-sided low back pain with right-sided sciatica   • Chest pain   • Thrombocytosis   • Leukocytosis   • Iron deficiency   • Adverse effect of iron   • Hindfoot varus,  acquired, left   • Chest pain due to myocardial ischemia   • Nonrheumatic tricuspid valve regurgitation   • Iron deficiency anemia due to chronic blood loss   • Malaise and fatigue   • Nonunion of subtalar arthrodesis   • Ankle arthritis   • Cellulitis of left lower extremity   • Urinary retention   • Acute bacterial endocarditis   • Staphylococcus aureus bacteremia   • Infection due to Acinetobacter species   • Endocarditis   • Left foot infection   • Uncontrolled hypertension   • Occlusion and stenosis of bilateral carotid arteries   • S/P BKA (below knee amputation) unilateral, left ([facility])   • Phantom pain after amputation of lower extremity ([facility])   • S/P CABG (coronary artery bypass graft)   • Acute colitis   • Severe malnutrition ([facility])   • Sepsis ([facility])   • TIA (transient ischemic attack)   • Chest pain, unspecified type   • Coronary artery abnormality   • Hypotension   • CHON (acute kidney injury) ([facility])   • Confusion     Past Medical History:   Diagnosis Date   • Angina, class IV ([facility])    • Anxiety    • Anxiety and depression    • Arthritis    • Benign paroxysmal positional vertigo    • Bladder disorder     has bladder stimulator   • Carpal tunnel syndrome    • CHF (congestive heart failure) ([facility])    • Chronic pain    • Coronary atherosclerosis     hx CABG 2005.  is followed by Dr Kwon   • Depression    • Diabetes mellitus ([facility])     Type 2, controlled   • Diabetic polyneuropathy ([facility])    • Disease of thyroid gland    • Elevated cholesterol    • Female stress incontinence    • Foot pain, left    • Full dentures    • Gastroparesis    • GERD (gastroesophageal reflux disease)    • Hyperlipidemia    • Hypertension    • Low back pain    • Malaise and fatigue    • Multiple joint pain    • Obesity     Refuses to be weighed   • Occlusion and stenosis of bilateral carotid arteries 6/18/2021   • Otalgia     Both   • Perforation of tympanic membrane     Left   • Postoperative wound infection    • Vitamin D deficiency     • Wears glasses     reading     Past Surgical History:   Procedure Laterality Date   • ABDOMINAL SURGERY     • AMPUTATION FOOT     • ANGIOPLASTY      coronary   • ANKLE ARTHROSCOPY Left 2/26/2021    Procedure: Left foot hardware removal, ankle arthroscopy, bone grafting , left foot exostectomy;  Surgeon: Ignacio Lord DPM;  Location: [facility] OR;  Service: Podiatry;  Laterality: Left;   • BREAST BIOPSY Right    • CARDIAC CATHETERIZATION     • CARDIAC CATHETERIZATION N/A 6/20/2017    Procedure: Right Heart Cath;  Surgeon: Can Kwon MD PhD;  Location: [facility] CATH INVASIVE LOCATION;  Service:    • CARDIAC CATHETERIZATION N/A 2/18/2020    Procedure: Left Heart Cath;  Surgeon: Catalina Cooper MD;  Location: [facility] CATH INVASIVE LOCATION;  Service: Cardiology;  Laterality: N/A;   • CARDIAC CATHETERIZATION N/A 4/6/2022    Procedure: Left Heart Cath;  Surgeon: Sheryl Navas MD;  Location: [facility] CATH INVASIVE LOCATION;  Service: Cardiology;  Laterality: N/A;   • CARPAL TUNNEL RELEASE     • CHOLECYSTECTOMY     • COLONOSCOPY N/A 6/24/2020    Procedure: COLONOSCOPY;  Surgeon: Julián Maldonado MD;  Location: [facility] ENDOSCOPY;  Service: Gastroenterology;  Laterality: N/A;   • CORONARY ARTERY BYPASS GRAFT  2005   • ENDOSCOPY N/A 10/19/2018    Procedure: ESOPHAGOGASTRODUODENOSCOPY possible dilation;  Surgeon: Julián Maldonado MD;  Location: [facility] ENDOSCOPY;  Service: Gastroenterology   • ENDOSCOPY N/A 6/24/2020    Procedure: ESOPHAGOGASTRODUODENOSCOPY WED appt please;  Surgeon: Julián Maldonado MD;  Location: [facility] ENDOSCOPY;  Service: Gastroenterology;  Laterality: N/A;   • ENDOSCOPY AND COLONOSCOPY     • FOOT SURGERY      Toes   • FOOT SURGERY     • GASTRIC BANDING      Revision, laparoscopic   • HYSTERECTOMY     • INCISION AND DRAINAGE LEG Left 3/12/2021    Procedure: Left ankle arthroscopic irrigation and debridement, screw removal;  Surgeon: Ignacio Lord DPM;  Location: [facility] OR;  Service:  Podiatry;  Laterality: Left;   • MOUTH SURGERY     • SALPINGO OOPHORECTOMY     • SHOULDER SURGERY     • SUBTALAR ARTHRODESIS Left 1/16/2019    Procedure: LEFT FOOT HARDWARE REMOVAL, FIFTH METATARSAL , OPEN REDUCTION INTERNAL FIXATION, CALCANEAL OSTEOTOMY;  Surgeon: Ignacio Lord DPM;  Location: [facility];  Service: Podiatry   • SUBTALAR ARTHRODESIS Left 10/16/2019    Procedure: foot hardware removal, subtalar joint fusion  possible de/reattachment of achilles tendon        (c-arm);  Surgeon: Ignacio Lord DPM;  Location: [facility];  Service: Podiatry   • SUBTALAR ARTHRODESIS Left 9/30/2020    Procedure: subtalar, talonavicular joint arthrodesis.  Removal hardware.          (c-arm);  Surgeon: Ignacio Lord DPM;  Location: [facility];  Service: Podiatry;  Laterality: Left;   • TRANSESOPHAGEAL ECHOCARDIOGRAM ([place])      With color flow      General Information     Row Name 06/01/22 1433          Physical Therapy Time and Intention    Document Type evaluation  -LR     Mode of Treatment physical therapy;occupational therapy  -LR     Row Name 06/01/22 1433          General Information    Patient Profile Reviewed yes  -LR     Existing Precautions/Restrictions fall  -LR     Barriers to Rehab previous functional deficit  -LR     Row Name 06/01/22 1433          Living Environment    People in Home spouse  -LR     Row Name 06/01/22 1433          Stairs Within Home, Primary    Stairs, Within Home, Primary uses prosthesis daily in/out of house using RW; has but not using bsc ; uses regular toilet w/ arabella hand rails; walk in shower w/ chair; does not walk distances w/o prosthesis; indep toiletting; he assists in/out of shower and supvervises safety in absence of grab bars; plans to start driving; indep dressing  -LR     Stairs Comment, Within Home, Primary uses ramp to enter home; no steps inside; no recent fall but had one just after amputation; She does not walk w/out prosthesis any distance but can transfer w/  FWRW w/out prosthesis  -LR     Row Name 06/01/22 1433          Safety Issues, Functional Mobility    Comment, Safety Issues/Impairments (Mobility) Pt has been BKA since Memorial Day 2021 and has prosthesis since ~ Nov 2021.  She did rehab in [facility] and now outpt PT at [facility] here in town  -LR           User Key  (r) = Recorded By, (t) = Taken By, (c) = Cosigned By    Initials Name Provider Type    LR Hernando Lopes Physical Therapist               Mobility     Row Name 06/01/22 1433          Bed Mobility    Bed Mobility sit-supine;supine-sit  -LR     Supine-Sit [place] (Bed Mobility) minimum assist (75% patient effort)  -LR     Sit-Supine [place] (Bed Mobility) contact guard  -LR     Assistive Device (Bed Mobility) bed rails;head of bed elevated  -LR     Row Name 06/01/22 1433          Bed-Chair Transfer    Bed-Chair [place] (Transfers) minimum assist (75% patient effort);2 person assist  -LR     Assistive Device (Bed-Chair Transfers) walker, front-wheeled  -LR     Row Name 06/01/22 1433          Sit-Stand Transfer    Sit-Stand [place] (Transfers) minimum assist (75% patient effort)  -LR     Assistive Device (Sit-Stand Transfers) walker, front-wheeled  -LR     Row Name 06/01/22 1433          Gait/Stairs (Locomotion)    [place] Level (Gait) not tested  -LR           User Key  (r) = Recorded By, (t) = Taken By, (c) = Cosigned By    Initials Name Provider Type    LR Hernando Lopes Physical Therapist               Obj/Interventions     Row Name 06/01/22 1433          Range of Motion Comprehensive    General Range of Motion no range of motion deficits identified  -LR     Comment, General Range of Motion BLE grossly WFL  -LR     Row Name 06/01/22 1433          Strength Comprehensive (MMT)    Comment, General Manual Muscle Testing (MMT) Assessment BLE grossly 5/5  -LR     Row Name 06/01/22 1433          Sensory Assessment (Somatosensory)    Sensory Assessment (Somatosensory) sensation intact;LE  sensation intact  Complains of numbness and tingling in entire RLE  -LR           User Key  (r) = Recorded By, (t) = Taken By, (c) = Cosigned By    Initials Name Provider Type    LR Hernando Lopes Physical Therapist               Goals/Plan     Row Name 06/01/22 1433          Bed Mobility Goal 1 (PT)    Activity/Assistive Device (Bed Mobility Goal 1, PT) sit to supine;supine to sit  -LR     [place] Level/Cues Needed (Bed Mobility Goal 1, PT) independent  -LR     Time Frame (Bed Mobility Goal 1, PT) by discharge  -LR     Progress/Outcomes (Bed Mobility Goal 1, PT) goal not met  -LR     Row Name 06/01/22 1433          Transfer Goal 1 (PT)    Activity/Assistive Device (Transfer Goal 1, PT) sit-to-stand/stand-to-sit;bed-to-chair/chair-to-bed  -LR     [place] Level/Cues Needed (Transfer Goal 1, PT) modified independence  -LR     Time Frame (Transfer Goal 1, PT) by discharge  -LR     Progress/Outcome (Transfer Goal 1, PT) goal not met  -LR     Row Name 06/01/22 1433          Gait Training Goal 1 (PT)    Activity/Assistive Device (Gait Training Goal 1, PT) gait (walking locomotion);assistive device use  -LR     [place] Level (Gait Training Goal 1, PT) modified independence  -LR     Distance (Gait Training Goal 1, PT) 150'x1  -LR     Time Frame (Gait Training Goal 1, PT) by discharge  -LR     Progress/Outcome (Gait Training Goal 1, PT) goal not met  -LR     Row Name 06/01/22 1433          Therapy Assessment/Plan (PT)    Planned Therapy Interventions (PT) balance training;bed mobility training;gait training;home exercise program;joint mobilization;lumbar stabilization;patient/family education;orthotic fitting/training;neuromuscular re-education;manual therapy techniques;postural re-education;prosthetic fitting/training;ROM (range of motion);stair training;strengthening;stretching;transfer training;vestibular therapy;wheelchair management/propulsion training  -LR           User Key  (r) = Recorded By, (t) =  Taken By, (c) = Cosigned By    Initials Name Provider Type    Hernando Howe Physical Therapist               Clinical Impression     Row Name 06/01/22 1433          Pain    Pretreatment Pain Rating 0/10 - no pain  -LR     Posttreatment Pain Rating 0/10 - no pain  -LR     Row Name 06/01/22 1433          Plan of Care Review    Plan of Care Reviewed With patient  -LR     Outcome Evaluation PT eval completed. Patient is not currently going to OPPT for gait training secondary to cost barrier with insurance. Bed mobility: MinAx1 for supine>sit transfer, CGA for sit>supine transfer with HOB up and bed rails. Transfers: MinAx2 for sit<>stand and bed<>chair transfer with RW. Patient normally transfers by doing 90 to 180 stand pivot to get from w/c to [facility] when she does not have her prosthesis on. Gait: Not tested as patient does not ambulate without prosthesis. Patient uses RW for ambulation right now but plans to get to the point she doesn't need RW or a SPC.  -LR     Row Name 06/01/22 1433          Therapy Assessment/Plan (PT)    Patient/Family Therapy Goals Statement (PT) Patient wants to return home.  -LR     Rehab Potential (PT) good, to achieve stated therapy goals  -LR     Criteria for Skilled Interventions Met (PT) yes;meets criteria;skilled treatment is necessary  -LR     Therapy Frequency (PT) other (see comments)  5-7d/week  -LR     Predicted Duration of Therapy Intervention (PT) Until d/c or until all goals met  -LR     Row Name 06/01/22 1433          Vital Signs    Pre Systolic BP Rehab 102  -LR     Pre Treatment Diastolic BP 53  -LR     Post Systolic BP Rehab 148  -LR     Post Treatment Diastolic BP 58  -LR     Pretreatment Heart Rate (beats/min) 72  -LR     Posttreatment Heart Rate (beats/min) 74  -LR     Pre SpO2 (%) 99  -LR     O2 Delivery Pre Treatment room air  -LR     Post SpO2 (%) 99  -LR     O2 Delivery Post Treatment room air  -LR     Pre Patient Position Supine  -LR     Post Patient Position  Supine  -LR     Row Name 06/01/22 1433          Positioning and Restraints    Pre-Treatment Position in bed  -LR     Post Treatment Position bed  -LR     In Bed encouraged to call for assist;exit alarm on;call light within reach;notified nsg  -LR           User Key  (r) = Recorded By, (t) = Taken By, (c) = Cosigned By    Initials Name Provider Type    LR Hernando Lopes Physical Therapist               Outcome Measures     Row Name 06/01/22 1433          How much help from another person do you currently need...    Turning from your back to your side while in flat bed without using bedrails? 3  -LR     Moving from lying on back to sitting on the side of a flat bed without bedrails? 3  -LR     Moving to and from a bed to a chair (including a wheelchair)? 3  -LR     Standing up from a chair using your arms (e.g., wheelchair, bedside chair)? 3  -LR     Climbing 3-5 steps with a railing? 3  -LR     To walk in hospital room? 3  -LR     AM-PAC 6 Clicks Score (PT) 18  -LR     Highest level of mobility 6 --> Walked 10 steps or more  -LR     Row Name 06/01/22 1434 06/01/22 1433       Functional Assessment    Outcome Measure Options AM-PAC 6 Clicks Daily Activity (OT)  -ME AM-PAC 6 Clicks Basic Mobility (PT)  -LR          User Key  (r) = Recorded By, (t) = Taken By, (c) = Cosigned By    Initials Name Provider Type    LR Hernando Lopes Physical Therapist    ME Severiano Pettit, OTR/L Occupational Therapist                             Physical Therapy Education                 Title: PT OT SLP Therapies (In Progress)     Topic: Physical Therapy (Done)     Point: Mobility training (Done)     Learning Progress Summary           Patient Acceptance, E,TB, VU by LR at 6/1/2022 1609    Comment: Educated on PT POC and goals.                   Point: Home exercise program (Done)     Learning Progress Summary           Patient Acceptance, E,TB, VU by LR at 6/1/2022 1609    Comment: Educated on PT POC and goals.                   Point:  Body mechanics (Done)     Learning Progress Summary           Patient Acceptance, E,TB, VU by LR at 6/1/2022 1609    Comment: Educated on PT POC and goals.                   Point: Precautions (Done)     Learning Progress Summary           Patient Acceptance, E,TB, VU by LR at 6/1/2022 1609    Comment: Educated on PT POC and goals.                               User Key     Initials Effective Dates Name Provider Type Discipline    LR 06/16/21 -  Hernando Lopes Physical Therapist PT              PT Recommendation and Plan  Planned Therapy Interventions (PT): balance training, bed mobility training, gait training, home exercise program, joint mobilization, lumbar stabilization, patient/family education, orthotic fitting/training, neuromuscular re-education, manual therapy techniques, postural re-education, prosthetic fitting/training, ROM (range of motion), stair training, strengthening, stretching, transfer training, vestibular therapy, wheelchair management/propulsion training  Plan of Care Reviewed With: patient  Outcome Evaluation: PT eval completed. Patient is not currently going to OPPT for gait training secondary to cost barrier with insurance. Bed mobility: MinAx1 for supine>sit transfer, CGA for sit>supine transfer with HOB up and bed rails. Transfers: MinAx2 for sit<>stand and bed<>chair transfer with RW. Patient normally transfers by doing 90 to 180 stand pivot to get from w/c to bsc when she does not have her prosthesis on. Gait: Not tested as patient does not ambulate without prosthesis. Patient uses RW for ambulation right now but plans to get to the point she doesn't need RW or a SPC.     Time Calculation:    PT Charges     Row Name 06/01/22 1610             Time Calculation    Start Time 1433  -LR      Stop Time 1500  -LR      Time Calculation (min) 27 min  -LR      PT Received On 06/01/22  -LR      PT Goal Re-Cert Due Date 06/14/22  -LR              Untimed Charges    PT Eval/Re-eval Minutes 27  -LR               Total Minutes    Untimed Charges Total Minutes 27  -LR       Total Minutes 27  -LR            User Key  (r) = Recorded By, (t) = Taken By, (c) = Cosigned By    Initials Name Provider Type    LR Hernando Lopes Physical Therapist              Therapy Charges for Today     Code Description Service Date Service Provider Modifiers Qty    83235312262 HC PT EVAL MOD COMPLEXITY 2 6/1/2022 Hernando Lopes GP 1          PT G-Codes  Outcome Measure Options: AM-PAC 6 Clicks Daily Activity (OT)  AM-PAC 6 Clicks Score (PT): 18  AM-PAC 6 Clicks Score (OT): 18    Hernando Lopes  6/1/2022

## 2022-06-01 NOTE — PROGRESS NOTES
Stroke Progress Note       Chief Complaint: Slurred speech at times    Subjective     HPI: Pt is a 62-year-old right-handed white female with history of poorly controlled diabetes with A1C of 11.3 was found to have word finding difficulties and confusion yesterday morning around 9. Upon arriving to the ER her glucose was 432. This morning she is alert and oriented X4, strengths are equal 5/5, denies numbness, speech is fluent and no dysarthria noted, and visual field is intact. Pt  states feeling tired this morning and that she still feels she is slurring her words at times. Her creatinine is currently 2.86, GFR is 18, and sodium is 130.       Review of Systems   HENT: Negative.    Eyes: Negative.    Respiratory: Negative.    Cardiovascular: Negative.    Gastrointestinal: Negative.    Genitourinary: Negative.    Musculoskeletal: Positive for gait problem.        Left BKA   Skin: Negative.    Psychiatric/Behavioral: Negative.         Objective      Temp:  [97.5 °F (36.4 °C)-97.9 °F (36.6 °C)] 97.9 °F (36.6 °C)  Heart Rate:  [63-74] 70  Resp:  [18-20] 18  BP: ()/(43-66) 106/56    Neurological Exam  Mental Status  Alert and drowsy. Oriented to person, place and time. Oriented to person, place, and time. Speech is normal. Language is fluent with no aphasia.    Cranial Nerves  CN II: Visual acuity is normal. Visual fields full to confrontation.  CN III, IV, VI: Extraocular movements intact bilaterally. Normal lids and orbits bilaterally. Pupils equal round and reactive to light bilaterally.  CN V: Facial sensation is normal.  CN VII: Full and symmetric facial movement.  CN IX, X: Palate elevates symmetrically. Normal gag reflex.  CN XI: Shoulder shrug strength is normal.  CN XII: Tongue midline without atrophy or fasciculations.    Motor  Normal muscle bulk throughout. No fasciculations present. Strength is 5/5 throughout all four extremities.    Sensory  Sensation is intact to light touch, pinprick, vibration and  proprioception in all four extremities.    Reflexes  Not assessed.    Coordination    Finger-to-nose, rapid alternating movements and heel-to-shin normal bilaterally without dysmetria.    Gait    Not assessed d/t no prosthetic for left BKA.      Physical Exam  Vitals and nursing note reviewed.   Constitutional:       Appearance: Normal appearance.   HENT:      Head: Normocephalic and atraumatic.   Eyes:      General: Lids are normal.      Extraocular Movements: Extraocular movements intact.      Pupils: Pupils are equal, round, and reactive to light.   Cardiovascular:      Rate and Rhythm: Normal rate.   Pulmonary:      Effort: Pulmonary effort is normal.   Musculoskeletal:         General: Normal range of motion.      Cervical back: Normal range of motion.   Skin:     General: Skin is warm and dry.   Neurological:      General: No focal deficit present.      Mental Status: She is alert and oriented to person, place, and time.      Coordination: Coordination is intact.      Deep Tendon Reflexes: Strength normal.   Psychiatric:         Mood and Affect: Mood normal.         Speech: Speech normal.         Results Review:    I reviewed the patient's new clinical results.    Lab Results (last 24 hours)     Procedure Component Value Units Date/Time    Sedimentation Rate [[id number]]  (Abnormal) Collected: 06/01/22 0631    Specimen: Blood Updated: 06/01/22 0807     Sed Rate 32 mm/hr     TSH [[id number]]  (Abnormal) Collected: 06/01/22 0631    Specimen: Blood Updated: 06/01/22 0729     TSH 4.240 uIU/mL     Lipid Panel [[id number]]  (Abnormal) Collected: 06/01/22 0631    Specimen: Blood Updated: 06/01/22 0724     Total Cholesterol 154 mg/dL      Triglycerides 288 mg/dL      HDL Cholesterol 35 mg/dL      LDL Cholesterol  72 mg/dL      VLDL Cholesterol 47 mg/dL      LDL/HDL Ratio 1.75    Narrative:      Cholesterol Reference Ranges  (U.S. Department of Health and Human Services ATP III Classifications)    Desirable          <200  mg/dL  Borderline High    200-239 mg/dL  High Risk          >240 mg/dL      Triglyceride Reference Ranges  (U.S. Department of Health and Human Services ATP III Classifications)    Normal           <150 mg/dL  Borderline High  150-199 mg/dL  High             200-499 mg/dL  Very High        >500 mg/dL    HDL Reference Ranges  (U.S. Department of Health and Human Services ATP III Classifications)    Low     <40 mg/dl (major risk factor for CHD)  High    >60 mg/dl ('negative' risk factor for CHD)        LDL Reference Ranges  (U.S. Department of Health and Human Services ATP III Classifications)    Optimal          <100 mg/dL  Near Optimal     100-129 mg/dL  Borderline High  130-159 mg/dL  High             160-189 mg/dL  Very High        >189 mg/dL    Hemoglobin A1c [[id number]]  (Abnormal) Collected: 06/01/22 0631    Specimen: Blood Updated: 06/01/22 0717     Hemoglobin A1C 11.10 %     Narrative:      Hemoglobin A1C Ranges:    Increased Risk for Diabetes  5.7% to 6.4%  Diabetes                     >= 6.5%  Diabetic Goal                < 7.0%    CBC (No Diff) [[id number]]  (Abnormal) Collected: 06/01/22 0631    Specimen: Blood Updated: 06/01/22 0704     WBC 14.22 10*3/mm3      RBC 3.80 10*6/mm3      Hemoglobin 11.4 g/dL      Hematocrit 34.5 %      MCV 90.8 fL      MCH 30.0 pg      MCHC 33.0 g/dL      RDW 13.6 %      RDW-SD 44.4 fl      MPV 10.7 fL      Platelets 298 10*3/mm3     Vitamin B12 [[id number]] Collected: 06/01/22 0631    Specimen: Blood Updated: 06/01/22 0657    Folate [[id number]] Collected: 06/01/22 0631    Specimen: Blood Updated: 06/01/22 0657    POC Glucose Once [[id number]]  (Abnormal) Collected: 06/01/22 0624    Specimen: Blood Updated: 06/01/22 0637     Glucose 244 mg/dL      Comment: RN NotifiedOperator: [id number] ASIYA MADSENMeter ID: [id number]       Urine Drug Screen - Urine, Catheter In/Out [[id number]]  (Abnormal) Collected: 06/01/22 0318    Specimen: Urine, Catheter In/Out Updated:  06/01/22 0411     THC, Screen, Urine Negative     Phencyclidine (PCP), Urine Negative     Cocaine Screen, Urine Negative     Methamphetamine, Ur Negative     Opiate Screen Positive     Amphetamine Screen, Urine Negative     Benzodiazepine Screen, Urine Positive     Tricyclic Antidepressants Screen Negative     Methadone Screen, Urine Negative     Barbiturates Screen, Urine Negative     Oxycodone Screen, Urine Negative     Propoxyphene Screen Negative     Buprenorphine, Screen, Urine Negative    Narrative:      Cutoff For Drugs Screened:    Amphetamines               500 ng/ml  Barbiturates               200 ng/ml  Benzodiazepines            150 ng/ml  Cocaine                    150 ng/ml  Methadone                  200 ng/ml  Opiates                    100 ng/ml  Phencyclidine               25 ng/ml  THC                            50 ng/ml  Methamphetamine            500 ng/ml  Tricyclic Antidepressants  300 ng/ml  Oxycodone                  100 ng/ml  Propoxyphene               300 ng/ml  Buprenorphine               10 ng/ml    The normal value for all drugs tested is negative. This report includes unconfirmed screening results, with the cutoff values listed, to be used for medical treatment purposes only.  Unconfirmed results must not be used for non-medical purposes such as employment or legal testing.  Clinical consideration should be applied to any drug of abuse test, particularly when unconfirmed results are used.      Urinalysis With Culture If Indicated - Urine, Catheter In/Out [[id number]]  (Abnormal) Collected: 06/01/22 0318    Specimen: Urine, Catheter In/Out Updated: 06/01/22 0346     Color, UA Yellow     Appearance, UA Clear     pH, UA <=5.0     Specific Gravity, UA 1.016     Glucose,  mg/dL (1+)     Ketones, UA Trace     Bilirubin, UA Negative     Blood, UA Negative     Protein, UA Negative     Leuk Esterase, UA Negative     Nitrite, UA Negative     Urobilinogen, UA 1.0 E.U./dL    Narrative:       In absence of clinical symptoms, the presence of pyuria, bacteria, and/or nitrites on the urinalysis result does not correlate with infection.  Urine microscopic not indicated.    COVID-19 and FLU A/B PCR - Swab, Nasopharynx [[id number]]  (Normal) Collected: 06/01/22 0035    Specimen: Swab from Nasopharynx Updated: 06/01/22 0126     COVID19 Not Detected     Influenza A PCR Not Detected     Influenza B PCR Not Detected    Narrative:      Fact sheet for providers: https://www.fda.gov/media/585111/download    Fact sheet for patients: https://www.fda.gov/media/263901/download    Test performed by PCR.    POC Glucose Once [[id number]]  (Normal) Collected: 06/01/22 0032    Specimen: Blood Updated: 06/01/22 0052     Glucose 122 mg/dL      Comment: RN NotifiedOperator: [id number] CICI LERINMeter ID: [id number]       Lactic Acid, Plasma [[id number]]  (Normal) Collected: 05/31/22 2325    Specimen: Blood Updated: 05/31/22 2341     Lactate 1.5 mmol/L     Blood Culture - Blood, Arm, Left [[id number]] Collected: 05/31/22 2317    Specimen: Blood from Arm, Left Updated: 05/31/22 2317    Blood Culture - Blood, Hand, Left [[id number]] Collected: 05/31/22 2317    Specimen: Blood from Hand, Left Updated: 05/31/22 2317    T4, Free [[id number]]  (Normal) Collected: 05/31/22 1849    Specimen: Blood Updated: 05/31/22 2243     Free T4 1.13 ng/dL     Narrative:      Results may be falsely increased if patient taking Biotin.      Extra Tubes [[id number]] Collected: 05/31/22 1849    Specimen: Blood, Venous Line Updated: 05/31/22 2002    Narrative:      The following orders were created for panel order Extra Tubes.  Procedure                               Abnormality         Status                     ---------                               -----------         ------                     Gold Top - SST[[id number]]                                   Final result                 Please view results for these tests on the individual orders.    Gold Top  - SST [[id number]] Collected: 05/31/22 1849    Specimen: Blood Updated: 05/31/22 2002     Extra Tube Hold for add-ons.     Comment: Auto resulted.       aPTT [[id number]]  (Normal) Collected: 05/31/22 1849    Specimen: Blood Updated: 05/31/22 1941     PTT 25.4 seconds     Narrative:      The recommended Heparin therapeutic range is 68-97 seconds.    Protime-INR [[id number]]  (Normal) Collected: 05/31/22 1849    Specimen: Blood Updated: 05/31/22 1941     Protime 12.2 Seconds      INR 0.92    Narrative:      Therapeutic range for most indications is 2.0-3.0 INR,  or 2.5-3.5 for mechanical heart valves.    Comprehensive Metabolic Panel [[id number]]  (Abnormal) Collected: 05/31/22 1849    Specimen: Blood Updated: 05/31/22 1932     Glucose 432 mg/dL      BUN 34 mg/dL      Creatinine 2.86 mg/dL      Sodium 130 mmol/L      Potassium 5.1 mmol/L      Comment: Slight hemolysis detected by analyzer. Results may be affected.        Chloride 95 mmol/L      CO2 23.0 mmol/L      Calcium 8.7 mg/dL      Total Protein 6.4 g/dL      Albumin 3.60 g/dL      ALT (SGPT) 13 U/L      AST (SGOT) 11 U/L      Alkaline Phosphatase 130 U/L      Total Bilirubin 0.2 mg/dL      Globulin 2.8 gm/dL      A/G Ratio 1.3 g/dL      BUN/Creatinine Ratio 11.9     Anion Gap 12.0 mmol/L      eGFR 18.3 mL/min/1.73      Comment: National Kidney Foundation and American Society of Nephrology (ASN) Task Force recommended calculation based on the Chronic Kidney Disease Epidemiology Collaboration (CKD-EPI) equation refit without adjustment for race.       Narrative:      GFR Normal >60  Chronic Kidney Disease <60  Kidney Failure <15      CK [[id number]]  (Normal) Collected: 05/31/22 1849    Specimen: Blood Updated: 05/31/22 1931     Creatine Kinase 127 U/L     Magnesium [[id number]]  (Normal) Collected: 05/31/22 1849    Specimen: Blood Updated: 05/31/22 1931     Magnesium 1.8 mg/dL     Troponin [[id number]]  (Normal) Collected: 05/31/22 1849    Specimen: Blood Updated:  05/31/22 1930     Troponin T <0.010 ng/mL     Narrative:      Troponin T Reference Range:  <= 0.03 ng/mL-   Negative for AMI  >0.03 ng/mL-     Abnormal for myocardial necrosis.  Clinicians would have to utilize clinical acumen, EKG, Troponin and serial changes to determine if it is an Acute Myocardial Infarction or myocardial injury due to an underlying chronic condition.       Results may be falsely decreased if patient taking Biotin.      BNP [[id number]]  (Normal) Collected: 05/31/22 1849    Specimen: Blood Updated: 05/31/22 1928     proBNP 805.7 pg/mL     Narrative:      Among patients with dyspnea, NT-proBNP is highly sensitive for the detection of acute congestive heart failure. In addition NT-proBNP of <300 pg/ml effectively rules out acute congestive heart failure with 99% negative predictive value.    Results may be falsely decreased if patient taking Biotin.      CBC & Differential [[id number]]  (Abnormal) Collected: 05/31/22 1849    Specimen: Blood Updated: 05/31/22 1857    Narrative:      The following orders were created for panel order CBC & Differential.  Procedure                               Abnormality         Status                     ---------                               -----------         ------                     CBC Auto Differential[[id number]]        Abnormal            Final result                 Please view results for these tests on the individual orders.    CBC Auto Differential [[id number]]  (Abnormal) Collected: 05/31/22 1849    Specimen: Blood Updated: 05/31/22 1857     WBC 10.84 10*3/mm3      RBC 3.80 10*6/mm3      Hemoglobin 11.5 g/dL      Hematocrit 34.7 %      MCV 91.3 fL      MCH 30.3 pg      MCHC 33.1 g/dL      RDW 13.7 %      RDW-SD 45.2 fl      MPV 9.8 fL      Platelets 381 10*3/mm3      Neutrophil % 67.1 %      Lymphocyte % 23.6 %      Monocyte % 5.2 %      Eosinophil % 3.2 %      Basophil % 0.4 %      Immature Grans % 0.5 %      Neutrophils, Absolute 7.28 10*3/mm3       Lymphocytes, Absolute 2.56 10*3/mm3      Monocytes, Absolute 0.56 10*3/mm3      Eosinophils, Absolute 0.35 10*3/mm3      Basophils, Absolute 0.04 10*3/mm3      Immature Grans, Absolute 0.05 10*3/mm3      nRBC 0.0 /100 WBC         XR Chest 1 View    Result Date: 5/31/2022  1. Continued cardiomegaly. 2. Continued elevation of the right hemidiaphragm. 3. No acute cardiopulmonary disease. Electronically signed by:  Miguel Kyle MD  5/31/2022 7:17 PM CDT Workstation: [id number]    CT Head Without Contrast Stroke Protocol    Addendum Date: 5/31/2022     ADDENDUM ADDENDUM #1 Critical findings were communicated to  at 7:08 PM on 5/31/2022. Electronically signed by:  Herberth Foss MD  5/31/2022 7:25 PM CDT Workstation: [id number]     Result Date: 5/31/2022  No acute intracranial process is identified. Electronically signed by:  Herberth Foss MD  5/31/2022 6:57 PM CDT Workstation: [id number]    Results for orders placed during the hospital encounter of 02/05/22    Adult Transthoracic Echo Complete W/ Cont if Necessary Per Protocol    Interpretation Summary  · The study is technically difficult for diagnosis. The quality of the study is limited due to patient body habitus. Verbal consent was obtained from the patient to use Definity image enhancer in order to optimize the study.  · Left ventricular wall thickness is consistent with moderate concentric hypertrophy.  · Estimated left ventricular EF = 63% Left ventricular ejection fraction appears to be 61 - 65%. Left ventricular systolic function is normal.  · Left ventricular diastolic function is consistent with (grade Ia w/high LAP) impaired relaxation.  · Saline test results are negative.  · Estimated right ventricular systolic pressure from tricuspid regurgitation is normal (<35 mmHg).  · No definite LV mass or thrombus noted on the study        Assessment/Plan     Assessment/Plan: Pt is a 62-year-old right-handed white female with known diagnosis of poorly controlled  diabetes with A1C of 11.3, hypertension, and hyperlipidemia was found to have word finding difficulties and confusion yesterday morning around 9. Upon arriving to the ER her glucose was 432. This morning she is sleepy and oriented X4, strengths are equal 5/5, denies numbness, speech is fluent and no dysarthria noted, and visual field is intact. Pt  states feeling tired this morning and that she still feels she is slurring her words at times. Her creatinine is currently 2.86, GFR is 18, and sodium is 130.   1. Confusion and dysarthria- Resolved, per nursing, pt was noted to have a little slurred speech after taking Ativan and Norco. She is unable to get a MRI d/t her bladder stimulator. Will get a repeat CT scan to rule out stroke d/t her risk factors. Continue aspirin 325 mg and Lipitor 80 mg.  2. Hypertension- Normal BP goals.  3. Hyperlipidemia- LDL is 72, continue current statin.  4. DM- A1C is 11, instructed on the importance of daily BP monitoring and control to reduce stroke risk.  5. Activity- Okay to work with PT/OT.  6. Diet- ADA heart-healthy diet.  Case was discussed with pt, Dr. Orellana, Hospitalist team, and nursing.   Addendum: Repeat CT is neg. Neurology will sign off.           Patient Active Problem List   Diagnosis   • 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, with long-term current use of insulin   • Closed nondisplaced fracture of fifth left metatarsal bone   • MAURICIO (generalized anxiety disorder)   • Depression, major, recurrent, moderate (HCC)   • GERD without esophagitis   • Long term prescription opiate use   • Mixed hyperlipidemia   • Vitamin D deficiency   • Seasonal allergic rhinitis   • Restrictive lung disease secondary to obesity   • Adult body mass index 37.0-37.9   • Snoring   • Class 3 severe obesity due to excess calories without serious comorbidity with body mass index (BMI) of 40.0 to 44.9 in adult (HCC)   • (HFpEF) heart failure with preserved ejection  fraction ([facility])   • Type 2 diabetes mellitus with hyperglycemia, with long-term current use of insulin ([facility])   • Cyanocobalamin deficiency   • Weakness   • Coronary artery disease of native artery of native heart with stable angina pectoris ([facility])   • Hypertension   • Meniere's disease   • Gastroparesis   • Otitis media with effusion, left   • Pulmonary hypertension ([facility])   • Displaced fracture of fifth metatarsal bone, left foot, subsequent encounter for fracture with nonunion   • Pes cavus   • Primary osteoarthritis involving multiple joints   • Generalized anxiety disorder   • Chronic right-sided low back pain with right-sided sciatica   • Chest pain   • Thrombocytosis   • Leukocytosis   • Iron deficiency   • Adverse effect of iron   • Hindfoot varus, acquired, left   • Chest pain due to myocardial ischemia   • Nonrheumatic tricuspid valve regurgitation   • Iron deficiency anemia due to chronic blood loss   • Malaise and fatigue   • Nonunion of subtalar arthrodesis   • Ankle arthritis   • Cellulitis of left lower extremity   • Urinary retention   • Acute bacterial endocarditis   • Staphylococcus aureus bacteremia   • Infection due to Acinetobacter species   • Endocarditis   • Left foot infection   • Uncontrolled hypertension   • Occlusion and stenosis of bilateral carotid arteries   • S/P BKA (below knee amputation) unilateral, left ([facility])   • Phantom 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 This time includes time spent by me in the following activities: preparing for the visit, reviewing tests, obtaining  and/or reviewing a separately obtained history, performing a medically appropriate examination and/or evaluation, counseling and educating the patient/family/caregiver, ordering medications, tests, or procedures, referring and communicating with other health care professionals, documenting information in the medical record, independently interpreting results and communicating that information with the patient/family/caregiver and care coordination

## 2022-06-01 NOTE — ED NOTES
Patient is at an increased risk for falls. Fall light activated, yellow falls bracelet and yellow non-skid socks placed on patient. Call light within reach and patient instructed to call for assistance. Side rails up x2, bed alarm activated, and gait belt readily accessible.     \

## 2022-06-01 NOTE — THERAPY EVALUATION
ED - Speech Language Pathology Initial Evaluation  [facility]     Patient Name: Ariana Martinez  : 1960  MRN: [id number]  Today's Date: 2022               Admit Date: 2022     Pt seen for skilled ST services this date to assess c/o slurred speech.  Pt presented as very lethargic, being unable to remain awake for even short periods of time.  Pt would fall asleep if not constantly actively engaged and still would leave eyes closed.  Pt's level of alertness impacted the ability to fully complete dysarthria assessment.  SLP did attempt to administer informal dysarthria assessment and gathered oral musculature to be WFL and oral agility to be at 70%.  SLP does feel that pt's lethary is a large barrier to intelligible speech vs true motor impairment as pt would demonstrate clear speech when eyes were open and moments of true alertness were achieved.  SLP recommends f/u w/pt to establish HEP and to allow for pt's lethargy to improve to r/o barrier.  SLP educated pt on results and recommendations and pt was in agreement.    Goal:  Establish and educate pt on HEP to include OME's, oral agility drills, and compensatory strategies w/pt demonstrating understanding by modeling exercises and strategies:     Visit Dx:    ICD-10-CM ICD-9-CM   1. Hypotension, unspecified hypotension type  I95.9 458.9   2. Stroke-like symptom  R29.90 781.99   3. Dysarthria  R47.1 784.51     Patient Active Problem List   Diagnosis   • 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, with long-term current use of insulin   • Closed nondisplaced fracture of fifth left metatarsal bone   • MAURICIO (generalized anxiety disorder)   • Depression, major, recurrent, moderate (HCC)   • GERD without esophagitis   • Long term prescription opiate use   • Mixed hyperlipidemia   • Vitamin D deficiency   • Seasonal allergic rhinitis   • Restrictive lung disease secondary to obesity   • Adult body mass index 37.0-37.9   • Snoring    • Class 3 severe obesity due to excess calories without serious comorbidity with body mass index (BMI) of 40.0 to 44.9 in adult ([facility])   • (HFpEF) heart failure with preserved ejection fraction ([facility])   • Type 2 diabetes mellitus with hyperglycemia, with long-term current use of insulin ([facility])   • Cyanocobalamin deficiency   • Weakness   • Coronary artery disease of native artery of native heart with stable angina pectoris ([facility])   • Hypertension   • Meniere's disease   • Gastroparesis   • Otitis media with effusion, left   • Pulmonary hypertension ([facility])   • Displaced fracture of fifth metatarsal bone, left foot, subsequent encounter for fracture with nonunion   • Pes cavus   • Primary osteoarthritis involving multiple joints   • Generalized anxiety disorder   • Chronic right-sided low back pain with right-sided sciatica   • Chest pain   • Thrombocytosis   • Leukocytosis   • Iron deficiency   • Adverse effect of iron   • Hindfoot varus, acquired, left   • Chest pain due to myocardial ischemia   • Nonrheumatic tricuspid valve regurgitation   • Iron deficiency anemia due to chronic blood loss   • Malaise and fatigue   • Nonunion of subtalar arthrodesis   • Ankle arthritis   • Cellulitis of left lower extremity   • Urinary retention   • Acute bacterial endocarditis   • Staphylococcus aureus bacteremia   • Infection due to Acinetobacter species   • Endocarditis   • Left foot infection   • Uncontrolled hypertension   • Occlusion and stenosis of bilateral carotid arteries   • S/P BKA (below knee amputation) unilateral, left ([facility])   • Phantom pain after amputation of lower extremity ([facility])   • S/P CABG (coronary artery bypass graft)   • Acute colitis   • Severe malnutrition ([facility])   • Sepsis ([facility])   • TIA (transient ischemic attack)   • Chest pain, unspecified type   • Coronary artery abnormality   • Hypotension   • CHON (acute kidney injury) ([facility])   • Confusion     Past Medical History:   Diagnosis Date   • Angina, class IV  ([facility])    • Anxiety    • Anxiety and depression    • Arthritis    • Benign paroxysmal positional vertigo    • Bladder disorder     has bladder stimulator   • Carpal tunnel syndrome    • CHF (congestive heart failure) ([facility])    • Chronic pain    • Coronary atherosclerosis     hx CABG 2005.  is followed by Dr Kwon   • Depression    • Diabetes mellitus ([facility])     Type 2, controlled   • Diabetic polyneuropathy ([facility])    • Disease of thyroid gland    • Elevated cholesterol    • Female stress incontinence    • Foot pain, left    • Full dentures    • Gastroparesis    • GERD (gastroesophageal reflux disease)    • Hyperlipidemia    • Hypertension    • Low back pain    • Malaise and fatigue    • Multiple joint pain    • Obesity     Refuses to be weighed   • Occlusion and stenosis of bilateral carotid arteries 6/18/2021   • Otalgia     Both   • Perforation of tympanic membrane     Left   • Postoperative wound infection    • Vitamin D deficiency    • Wears glasses     reading     Past Surgical History:   Procedure Laterality Date   • ABDOMINAL SURGERY     • AMPUTATION FOOT     • ANGIOPLASTY      coronary   • ANKLE ARTHROSCOPY Left 2/26/2021    Procedure: Left foot hardware removal, ankle arthroscopy, bone grafting , left foot exostectomy;  Surgeon: Ignacio Lord DPM;  Location: [facility];  Service: Podiatry;  Laterality: Left;   • BREAST BIOPSY Right    • CARDIAC CATHETERIZATION     • CARDIAC CATHETERIZATION N/A 6/20/2017    Procedure: Right Heart Cath;  Surgeon: Can Kwon MD PhD;  Location: [facility] INVASIVE LOCATION;  Service:    • CARDIAC CATHETERIZATION N/A 2/18/2020    Procedure: Left Heart Cath;  Surgeon: Catalina Cooper MD;  Location: [facility] CATH INVASIVE LOCATION;  Service: Cardiology;  Laterality: N/A;   • CARDIAC CATHETERIZATION N/A 4/6/2022    Procedure: Left Heart Cath;  Surgeon: Sheryl Navas MD;  Location: [facility] CATH INVASIVE LOCATION;  Service: Cardiology;  Laterality: N/A;   •  CARPAL TUNNEL RELEASE     • CHOLECYSTECTOMY     • COLONOSCOPY N/A 6/24/2020    Procedure: COLONOSCOPY;  Surgeon: Julián Maldonado MD;  Location: [facility] ENDOSCOPY;  Service: Gastroenterology;  Laterality: N/A;   • CORONARY ARTERY BYPASS GRAFT  2005   • ENDOSCOPY N/A 10/19/2018    Procedure: ESOPHAGOGASTRODUODENOSCOPY possible dilation;  Surgeon: Julián Maldonado MD;  Location: [facility] ENDOSCOPY;  Service: Gastroenterology   • ENDOSCOPY N/A 6/24/2020    Procedure: ESOPHAGOGASTRODUODENOSCOPY WED appt please;  Surgeon: Julián Maldonado MD;  Location: [facility] ENDOSCOPY;  Service: Gastroenterology;  Laterality: N/A;   • ENDOSCOPY AND COLONOSCOPY     • FOOT SURGERY      Toes   • FOOT SURGERY     • GASTRIC BANDING      Revision, laparoscopic   • HYSTERECTOMY     • INCISION AND DRAINAGE LEG Left 3/12/2021    Procedure: Left ankle arthroscopic irrigation and debridement, screw removal;  Surgeon: Ignacio Lord DPM;  Location: [facility] OR;  Service: Podiatry;  Laterality: Left;   • MOUTH SURGERY     • SALPINGO OOPHORECTOMY     • SHOULDER SURGERY     • SUBTALAR ARTHRODESIS Left 1/16/2019    Procedure: LEFT FOOT HARDWARE REMOVAL, FIFTH METATARSAL , OPEN REDUCTION INTERNAL FIXATION, CALCANEAL OSTEOTOMY;  Surgeon: Ignacio Lord DPM;  Location: [facility] OR;  Service: Podiatry   • SUBTALAR ARTHRODESIS Left 10/16/2019    Procedure: foot hardware removal, subtalar joint fusion  possible de/reattachment of achilles tendon        (c-arm);  Surgeon: Ignacio Lord DPM;  Location: [facility] OR;  Service: Podiatry   • SUBTALAR ARTHRODESIS Left 9/30/2020    Procedure: subtalar, talonavicular joint arthrodesis.  Removal hardware.          (c-arm);  Surgeon: Ignacio Lord DPM;  Location: [facility] OR;  Service: Podiatry;  Laterality: Left;   • TRANSESOPHAGEAL ECHOCARDIOGRAM (LAMONTE)      With color flow       SLP Recommendation and Plan  SLP Diagnosis: mild dysarthria (06/01/22 1137)        SLC Criteria for Skilled Therapy  Interventions Met: yes (06/01/22 1137)  Anticipated Discharge Disposition (SLP): unknown (06/01/22 1137)        Predicted Duration Therapy Intervention (Days): 2 days (06/01/22 1137)                           SLP EVALUATION (last 72 hours)     SLP SLC Evaluation     Row Name 06/01/22 1137                   Communication Assessment/Intervention    Document Type evaluation  -CK        Total Evaluation Minutes, SLP 24  -CK        Subjective Information complains of;fatigue;weakness  -CK        Patient Observations lethargic;agree to therapy  -CK        Patient/Family/Caregiver Comments/Observations Pt's  at bedside, but left room as eval began  -CK        Patient Effort fair  -CK                  General Information    Patient Profile Reviewed yes  -CK        Pertinent History Of Current Problem Pt admitted w/stroke-like symptoms of confusion and slurred speech.  -CK        Precautions/Limitations, Vision corrective lenses needed for reading  -CK        Precautions/Limitations, Hearing WFL;for purposes of eval  -CK        Patient Level of Education Master's degree; educator  -CK        Plans/Goals Discussed with patient;agreed upon  -CK        Patient's Goals for Discharge patient did not state  -CK        Family Goals for Discharge family did not state  -CK                  Pain    Additional Documentation Pain Scale: Numbers Pre/Post-Treatment (Group)  -CK                  Pain Scale: Numbers Pre/Post-Treatment    Pretreatment Pain Rating 0/10 - no pain  -CK        Posttreatment Pain Rating 0/10 - no pain  -CK                  Oral Motor Structure and Function    Oral Motor Structure and Function WFL  -CK        Dentition Assessment upper dentures/partial in place;lower dentures/partial in place  -CK        Mucosal Quality moist, healthy  -CK                  Motor Speech Assessment/Intervention    Motor Speech Function mild impairment;unfamiliar listener  -CK        Characteristics Consistent with Dysarthria  slurred speech  -CK        Motor Speech, Comment Pt seen for skilled ST services this date to assess c/o slurred speech.  Pt presented as very lethargic, being unable to remain awake for even short periods of time.  Pt would fall asleep if not constantly actively engaged and still would leave eyes closed.  Pt's level of alertness impacted the ability to fully complete dysarthria assessment.  SLP did attempt to administer informal dysarthria assessment and gathered oral musculature to be WFL and oral agility to be at 70%.  SLP does feel that pt's lethary is a large barrier to intelligible speech vs true motor impairment as pt would demonstrate clear speech when eyes were open and moments of true alertness were achieved.  SLP recommends f/u w/pt to establish HEP and to allow for pt's lethargy to improve to r/o barrier.  SLP educated pt on results and recommendations and pt was in agreement.  -CK                  SLP Evaluation Clinical Impressions    SLP Diagnosis mild dysarthria  -CK        Rehab Potential/Prognosis good  -CK        SLC Criteria for Skilled Therapy Interventions Met yes  -CK        Functional Impact functional impact in social situations  -CK                  SLP Treatment Clinical Impressions    Care Plan Review evaluation/treatment results reviewed;care plan/treatment goals reviewed;risks/benefits reviewed;current/potential barriers reviewed;patient/other agree to care plan  -CK                  Recommendations    Therapy Frequency (SLP SLC) 1 day per week;2 days per week  -CK        Predicted Duration Therapy Intervention (Days) 2 days  -CK        Anticipated Discharge Disposition (SLP) unknown  -CK              User Key  (r) = Recorded By, (t) = Taken By, (c) = Cosigned By    Initials Name Effective Dates    CK Bessie Lomeli, MS CCC-SLP 06/16/21 -                    EDUCATION  The patient has been educated in the following areas:     Communication Impairment.                      Time  Calculation:      Time Calculation- SLP     Row Name 06/01/22 1201             Time Calculation- SLP    SLP Start Time 1137  -CK      SLP Stop Time 1201  -CK      SLP Time Calculation (min) 24 min  -CK      Total Timed Code Minutes- SLP 24 minute(s)  -CK      SLP Received On 06/01/22  -CK      SLP Goal Re-Cert Due Date 06/15/22  -CK              Untimed Charges    SLP Eval/Re-eval  ST Eval Speech and Production w/ Language - 62022  -CK      [id number] Eval Speech and Production w/ Language Minutes 24  -CK              Total Minutes    Untimed Charges Total Minutes 24  -CK       Total Minutes 24  -CK            User Key  (r) = Recorded By, (t) = Taken By, (c) = Cosigned By    Initials Name Provider Type     Bessie Lomeli, MS CCC-SLP Speech and Language Pathologist                Therapy Charges for Today     Code Description Service Date Service Provider Modifiers Qty    66371228788 HC ST EVAL SPEECH AND PROD W LANG  2 6/1/2022 Bessie Lomeli MS SANTI-SLP GN 1                     Bessie Lomeli MS CCC-MARYCHUY  6/1/2022

## 2022-06-01 NOTE — PLAN OF CARE
Goal Outcome Evaluation:  Plan of Care Reviewed With: patient           Outcome Evaluation: PT eval completed. Patient is not currently going to [facility]PP[facility] for gait training secondary to cost barrier with insurance. Bed mobility: MinAx1 for supine>sit transfer, CGA for sit>supine transfer with HOB up and bed rails. Transfers: MinAx2 for sit<>stand and bed<>chair transfer with RW. Patient normally transfers by doing 90 to 180 stand pivot to get from w/c to bsc when she does not have her prosthesis on. Gait: Not tested as patient does not ambulate without prosthesis. Patient uses RW for ambulation right now but plans to get to the point she doesn't need RW or a SPC.

## 2022-06-01 NOTE — H&P
[facility] Medicine Admission      Date of Admission: 5/31/2022      Primary Care Physician: Kimberly Ferguson APRN      Chief Complaint:   Confused  Weakness    HPI:  This patient is unfortunately very poor historian due to her confusion.  She does state that she developed bilateral leg weakness at 11 am.  Otherwise, she is very vague in her symptoms.  It seems that she has had a fluctuating altered mental status during her stay in the ED.  Initially the patient was being evaluated for slurred speech as well as left facial droop.  When I entered the room the patient was able to speak coherently when she was clearing her mind of her onset and had no facial droop.  It is evident that the patient has had a previous distal left leg amputation.      Concurrent Medical History:  has a past medical history of Angina, class IV ([facility]), Anxiety, Anxiety and depression, Arthritis, Benign paroxysmal positional vertigo, Bladder disorder, Carpal tunnel syndrome, CHF (congestive heart failure) ([facility]), Chronic pain, Coronary atherosclerosis, Depression, Diabetes mellitus ([facility]), Diabetic polyneuropathy ([facility]), Disease of thyroid gland, Elevated cholesterol, Female stress incontinence, Foot pain, left, Full dentures, Gastroparesis, GERD (gastroesophageal reflux disease), Hyperlipidemia, Hypertension, Low back pain, Malaise and fatigue, Multiple joint pain, Obesity, Occlusion and stenosis of bilateral carotid arteries (6/18/2021), Otalgia, Perforation of tympanic membrane, Postoperative wound infection, Vitamin D deficiency, and Wears glasses.    Past Surgical History:  has a past surgical history that includes Abdominal surgery; Angioplasty; Coronary artery bypass graft (2005); Cardiac catheterization; Carpal tunnel release; Cholecystectomy; endoscopy and colonoscopy; Mouth surgery; transesophageal echocardiogram ([place]); Foot surgery; gastric banding; Hysterectomy; Shoulder surgery;  Salpingoophorectomy; Cardiac catheterization (N/A, 6/20/2017); Breast biopsy (Right); Esophagogastroduodenoscopy (N/A, 10/19/2018); subtalar arthrodesis (Left, 1/16/2019); subtalar arthrodesis (Left, 10/16/2019); Foot surgery; Cardiac catheterization (N/A, 2/18/2020); Esophagogastroduodenoscopy (N/A, 6/24/2020); Colonoscopy (N/A, 6/24/2020); subtalar arthrodesis (Left, 9/30/2020); Ankle Arthroscopy (Left, 2/26/2021); incision and drainage leg (Left, 3/12/2021); Foot Amputation; and Cardiac catheterization (N/A, 4/6/2022).    Family History: family history includes Diabetes in her sister, sister, sister, sister, sister, sister and another family member; Heart disease in her mother, sister, sister, and another family member; Hypertension in her mother, sister, sister, and another family member; Stroke in her mother.     Social History:  reports that she has never smoked. She has never used smokeless tobacco. She reports that she does not drink alcohol and does not use drugs.    Allergies:   Allergies   Allergen Reactions   • Seroquel [Quetiapine] Anaphylaxis   • Avandia [Rosiglitazone] Swelling   • Morphine And Related Hallucinations   • Oxycodone Hallucinations       Medications:   Prior to Admission medications    Medication Sig Start Date End Date Taking? Authorizing Provider   ARIPiprazole (ABILIFY) 15 MG tablet TAKE 1 TABLET BY MOUTH EVERY DAY 1/24/22   Kimberly Ferguson APRN   aspirin  MG tablet Take 1 tablet by mouth Daily. 2/8/22   Mahin Esteves MD   atorvastatin (LIPITOR) 80 MG tablet TAKE 1 TABLET BY MOUTH EVERY DAY 4/26/22   Kimberly Ferguson APRN   BD SHARPS CONTAINER HOME misc 1 each Take As Directed. 9/7/18   Francisca Fong MD   Blood Glucose Monitoring Suppl (ONE TOUCH ULTRA MINI) w/Device kit Patient will need the Accu-Check Avitio meter 10/18/21   Kimberly Ferguson APRN   Calcium Citrate-Vitamin D 250-200 MG-UNIT tablet Take 2 tablets by mouth 2 (two) times a day.    Provider,  MD Esther   clopidogrel (PLAVIX) 75 MG tablet Take 1 tablet by mouth Daily. 2/28/22   Kimberly Ferguson APRN   cyanocobalamin 1000 MCG/ML injection INJECT 1 ML INTO THE APPROPRIATE MUSCLE AS DIRECTED BY PRESCRIBER EVERY 28 (TWENTY-EIGHT) DAYS. 4/13/22   Kimberly Ferguson APRN   famotidine (Pepcid) 20 MG tablet Take 1 tablet by mouth 2 (Two) Times a Day As Needed for Heartburn or Indigestion. 4/5/22   Larry Lopez MD   folic acid (FOLVITE) 1 MG tablet TAKE 1 TABLET BY MOUTH EVERY DAY 3/24/22   Teressa Hammond APRN   furosemide (LASIX) 40 MG tablet TAKE 1 TABLET BY MOUTH EVERY DAY 1/13/22   Kimberly Ferguson APRN   gabapentin (NEURONTIN) 400 MG capsule Take 1 capsule by mouth 3 (Three) Times a Day. 5/24/22   Kimberly Ferguson APRN   glucose blood (Accu-Chek Guide) test strip 1 each by Other route 4 (Four) Times a Day. To test blood sugar 4X daily. For  Dx E11.65 5/2/22   Kimberly Ferguson APRN   glucose monitor monitoring kit 1 each Daily. accucheck eve meter, E11.9 6/11/20   Elvis Gamboa APRN   hydroCHLOROthiazide (HYDRODIURIL) 25 MG tablet TAKE 1 TABLET BY MOUTH EVERY DAY 5/20/22   Kimberly Ferguson APRN   HYDROcodone-acetaminophen (NORCO) 7.5-325 MG per tablet Take 1 tablet by mouth Every 6 (Six) Hours As Needed for Moderate Pain . 5/16/22   Kimberly Ferguson APRN   insulin aspart (NovoLOG FlexPen) 100 UNIT/ML solution pen-injector sc pen INJECT 0 TO 14 UNITS UNDER THE SKIN 3 TIMES A DAY BEFORE MEALS ACCORDING TO SLIDING SCALE 4/21/22   Kimberly Ferguson APRN   Insulin Glargine (BASAGLAR KWIKPEN) 100 UNIT/ML injection pen INJECT 24 UNITS SUBCUTANEOUSLY AT BEDTIME 5/5/22   Kimberly Ferguson APRN   Insulin Pen Needle (B-D ULTRAFINE III SHORT PEN) 31G X 8 MM misc USE TO INJECT 5 TIMES A DAY 8/26/21   Elvis Gamboa APRN   Insulin Pen Needle 31G X 8 MM misc Use up to 4 (four) times daily with insulin as directed. 7/12/21   Brandon Martel MD   isosorbide  "mononitrate (IMDUR) 30 MG 24 hr tablet Take 1 tablet by mouth Daily. 4/7/22   Irina Watkins APRN   Lancets (accu-chek soft touch) lancets As directed 10/18/21   Ferguson, BEBE Luu   lisinopril (PRINIVIL,ZESTRIL) 20 MG tablet TAKE 1 TABLET BY MOUTH EVERY DAY 2/25/22   Marty, BEBE Luu   LORazepam (ATIVAN) 0.5 MG tablet Take 1 tablet by mouth Every 8 (Eight) Hours. 5/24/22   Ferguson, BEBE Luu   meclizine (ANTIVERT) 25 MG tablet Take 1 tablet by mouth 3 (Three) Times a Day As Needed for dizziness. 12/14/17   Evon Hernandez APRN   methocarbamol (ROBAXIN) 500 MG tablet TAKE 1 TABLET BY MOUTH 2 (TWO) TIMES A DAY AS NEEDED FOR MUSCLE SPASMS. 12/16/21   Marty, BEBE Luu   metoclopramide (REGLAN) 10 MG tablet TAKE 1 TABLET BY MOUTH 4 (FOUR) TIMES A DAY AS NEEDED (NAUSEA). 3/24/22   Marcela Lawson APRN   metoprolol succinate XL (TOPROL-XL) 50 MG 24 hr tablet TAKE 1 TABLET BY MOUTH DAILY. DOSE INCREASED 5/24/21 4/26/22   Marty, BEBE Luu   mirtazapine (REMERON) 30 MG tablet TAKE 1 TABLET BY MOUTH EVERY DAY AT NIGHT 5/20/22   Marty, BEBE Luu   montelukast (SINGULAIR) 10 MG tablet Take 1 tablet by mouth Every Night. To help with allergies causing runny nose 5/24/22   Marty, BEBE Luu   naloxone (NARCAN) 0.4 MG/ML injection Infuse 0.5 mL into a venous catheter Every 5 (Five) Minutes As Needed for Opioid Reversal. 3/13/21   Nick Faye MD   Needle, Disp, (Easy Touch FlipLock Needles) 25G X 1-1/2\" misc 1 each Every 28 (Twenty-Eight) Days. For administering B12 cyanocobalomin. Dx vitamin B12 deficiency. 5/24/22   Ferguson, BEBE Luu, Disp, (Hypodermic Needle) 20G X 1\" misc 1 each Every 28 (Twenty-Eight) Days. For drawing up B12 for injection monthly, change back to smaller gauge needle prior to injection. Dx Vitamin B12  Deficiency. 5/24/22   Kimberly Ferguson APRN   nitroglycerin (NITROSTAT) 0.4 MG SL tablet Place 1 tablet under the tongue Every 5 " "(Five) Minutes As Needed for Chest Pain. Take no more than 3 doses in 15 minutes. 4/26/22   Marty, BEBE Luu   NovoLOG 100 UNIT/ML injection INJECT 8 UNITS SUBCUTANEOUSLY 3 TIMES DAILY WITH MEALS. 3/25/22   Elvis Gamboa APRN   ondansetron (ZOFRAN) 8 MG tablet TAKE 1 TABLET BY MOUTH EVERY 8 (EIGHT) HOURS AS NEEDED FOR NAUSEA OR VOMITING. 12/17/21   Marty, BEBE Luu   pantoprazole (PROTONIX) 20 MG EC tablet Take 2 tablets by mouth Daily. 4/5/22   Larry Lopez MD   ranolazine (RANEXA) 500 MG 12 hr tablet Take 1 tablet by mouth Every 12 (Twelve) Hours. 4/7/22   Irina Watkins APRN   Syringe 20G X 1-1/2\" 3 ML misc 1 each Every 28 (Twenty-Eight) Days. For injection cyanocobalomin, Dx vit B12 deficiency. 5/24/22   Marty, BEBE Luu   venlafaxine XR (EFFEXOR-XR) 150 MG 24 hr capsule Take 1 capsule by mouth 2 (Two) Times a Day. 3/31/22   Ferguson, BEBE Luu   vitamin D (ERGOCALCIFEROL) 1.25 MG (72019 UT) capsule capsule TAKE ONE CAPSULE BY MOUTH EVERY SUNDAY. 12/16/21   Ferguson, BEBE Luu       Review of Systems:  Review of Systems   Constitutional: Positive for activity change. Negative for chills, diaphoresis, fatigue and fever.   HENT: Negative for congestion, drooling, hearing loss, tinnitus, trouble swallowing and voice change.    Eyes: Negative for photophobia and visual disturbance.   Respiratory: Negative for cough, shortness of breath, wheezing and stridor.    Cardiovascular: Negative for chest pain, palpitations and leg swelling.   Gastrointestinal: Negative for abdominal distention, abdominal pain, constipation, diarrhea, nausea and vomiting.   Genitourinary: Negative for difficulty urinating, dysuria, enuresis, flank pain, frequency, hematuria and urgency.   Musculoskeletal: Negative for arthralgias, joint swelling and myalgias.   Skin: Negative for color change, pallor and rash.   Neurological: Positive for facial asymmetry (Lt facial droop), speech " difficulty and weakness (both legs). Negative for dizziness, tremors, seizures, syncope, light-headedness, numbness and headaches.   Psychiatric/Behavioral: Positive for confusion and decreased concentration. Negative for agitation and hallucinations.      Otherwise complete ROS is negative except as mentioned above.    Physical Exam:   Temp:  [97.5 °F (36.4 °C)] 97.5 °F (36.4 °C)  Heart Rate:  [63-74] 63  Resp:  [18-20] 18  BP: (86-98)/(47-53) 98/53  Physical Exam  Constitutional:       General: She is not in acute distress.     Appearance: Normal appearance. She is obese. She is not toxic-appearing or diaphoretic.   HENT:      Head: Normocephalic and atraumatic.      Nose: Nose normal. No congestion or rhinorrhea.      Mouth/Throat:      Mouth: Mucous membranes are moist.      Pharynx: Oropharynx is clear. No oropharyngeal exudate or posterior oropharyngeal erythema.   Eyes:      General: No scleral icterus.     Extraocular Movements: Extraocular movements intact.      Conjunctiva/sclera: Conjunctivae normal.      Pupils: Pupils are equal, round, and reactive to light.   Cardiovascular:      Rate and Rhythm: Normal rate and regular rhythm.      Pulses: Normal pulses.      Heart sounds: Normal heart sounds. No murmur heard.    No gallop.   Pulmonary:      Effort: Pulmonary effort is normal. No respiratory distress.      Breath sounds: Normal breath sounds. No stridor. No wheezing, rhonchi or rales.   Abdominal:      General: Abdomen is flat. There is no distension.      Palpations: Abdomen is soft. There is no mass.      Tenderness: There is no abdominal tenderness.      Hernia: No hernia is present.   Musculoskeletal:         General: No swelling, tenderness, deformity or signs of injury.   Skin:     Capillary Refill: Capillary refill takes less than 2 seconds.      Coloration: Skin is not jaundiced.      Findings: No bruising, erythema or rash.   Neurological:      General: No focal deficit present.      Mental  Status: She is alert and oriented to person, place, and time. Mental status is at baseline.      Cranial Nerves: No cranial nerve deficit.      Sensory: No sensory deficit.      Motor: No weakness.   Psychiatric:         Behavior: Behavior normal.      Comments: Confused         Results Reviewed:  I have personally reviewed current lab, radiology, and data and agree with results.  Lab Results (last 24 hours)     Procedure Component Value Units Date/Time    Extra Tubes [[id number]] Collected: 05/31/22 1849    Specimen: Blood, Venous Line Updated: 05/31/22 2002    Narrative:      The following orders were created for panel order Extra Tubes.  Procedure                               Abnormality         Status                     ---------                               -----------         ------                     Gold Top - SST[[id number]]                                   Final result                 Please view results for these tests on the individual orders.    Gold Top - SST [[id number]] Collected: 05/31/22 1849    Specimen: Blood Updated: 05/31/22 2002     Extra Tube Hold for add-ons.     Comment: Auto resulted.       aPTT [[id number]]  (Normal) Collected: 05/31/22 1849    Specimen: Blood Updated: 05/31/22 1941     PTT 25.4 seconds     Narrative:      The recommended Heparin therapeutic range is 68-97 seconds.    Protime-INR [[id number]]  (Normal) Collected: 05/31/22 1849    Specimen: Blood Updated: 05/31/22 1941     Protime 12.2 Seconds      INR 0.92    Narrative:      Therapeutic range for most indications is 2.0-3.0 INR,  or 2.5-3.5 for mechanical heart valves.    Comprehensive Metabolic Panel [[id number]]  (Abnormal) Collected: 05/31/22 1849    Specimen: Blood Updated: 05/31/22 1932     Glucose 432 mg/dL      BUN 34 mg/dL      Creatinine 2.86 mg/dL      Sodium 130 mmol/L      Potassium 5.1 mmol/L      Comment: Slight hemolysis detected by analyzer. Results may be affected.        Chloride 95 mmol/L      CO2 23.0  mmol/L      Calcium 8.7 mg/dL      Total Protein 6.4 g/dL      Albumin 3.60 g/dL      ALT (SGPT) 13 U/L      AST (SGOT) 11 U/L      Alkaline Phosphatase 130 U/L      Total Bilirubin 0.2 mg/dL      Globulin 2.8 gm/dL      A/G Ratio 1.3 g/dL      BUN/Creatinine Ratio 11.9     Anion Gap 12.0 mmol/L      eGFR 18.3 mL/min/1.73      Comment: National Kidney Foundation and American Society of Nephrology (ASN) Task Force recommended calculation based on the Chronic Kidney Disease Epidemiology Collaboration (CKD-EPI) equation refit without adjustment for race.       Narrative:      GFR Normal >60  Chronic Kidney Disease <60  Kidney Failure <15      CK [[id number]]  (Normal) Collected: 05/31/22 1849    Specimen: Blood Updated: 05/31/22 1931     Creatine Kinase 127 U/L     Magnesium [[id number]]  (Normal) Collected: 05/31/22 1849    Specimen: Blood Updated: 05/31/22 1931     Magnesium 1.8 mg/dL     Troponin [[id number]]  (Normal) Collected: 05/31/22 1849    Specimen: Blood Updated: 05/31/22 1930     Troponin T <0.010 ng/mL     Narrative:      Troponin T Reference Range:  <= 0.03 ng/mL-   Negative for AMI  >0.03 ng/mL-     Abnormal for myocardial necrosis.  Clinicians would have to utilize clinical acumen, EKG, Troponin and serial changes to determine if it is an Acute Myocardial Infarction or myocardial injury due to an underlying chronic condition.       Results may be falsely decreased if patient taking Biotin.      BNP [[id number]]  (Normal) Collected: 05/31/22 1849    Specimen: Blood Updated: 05/31/22 1928     proBNP 805.7 pg/mL     Narrative:      Among patients with dyspnea, NT-proBNP is highly sensitive for the detection of acute congestive heart failure. In addition NT-proBNP of <300 pg/ml effectively rules out acute congestive heart failure with 99% negative predictive value.    Results may be falsely decreased if patient taking Biotin.      CBC & Differential [[id number]]  (Abnormal) Collected: 05/31/22 1849    Specimen:  Blood Updated: 05/31/22 1857    Narrative:      The following orders were created for panel order CBC & Differential.  Procedure                               Abnormality         Status                     ---------                               -----------         ------                     CBC Auto Differential[[id number]]        Abnormal            Final result                 Please view results for these tests on the individual orders.    CBC Auto Differential [[id number]]  (Abnormal) Collected: 05/31/22 1849    Specimen: Blood Updated: 05/31/22 1857     WBC 10.84 10*3/mm3      RBC 3.80 10*6/mm3      Hemoglobin 11.5 g/dL      Hematocrit 34.7 %      MCV 91.3 fL      MCH 30.3 pg      MCHC 33.1 g/dL      RDW 13.7 %      RDW-SD 45.2 fl      MPV 9.8 fL      Platelets 381 10*3/mm3      Neutrophil % 67.1 %      Lymphocyte % 23.6 %      Monocyte % 5.2 %      Eosinophil % 3.2 %      Basophil % 0.4 %      Immature Grans % 0.5 %      Neutrophils, Absolute 7.28 10*3/mm3      Lymphocytes, Absolute 2.56 10*3/mm3      Monocytes, Absolute 0.56 10*3/mm3      Eosinophils, Absolute 0.35 10*3/mm3      Basophils, Absolute 0.04 10*3/mm3      Immature Grans, Absolute 0.05 10*3/mm3      nRBC 0.0 /100 WBC         Imaging Results (Last 24 Hours)     Procedure Component Value Units Date/Time    CT Head Without Contrast Stroke Protocol [[id number]] Collected: 05/31/22 1842     Updated: 05/31/22 1927    Addenda:         ADDENDUM   ADDENDUM #1       Critical findings were communicated to  at 7:08 PM on  5/31/2022.     Electronically signed by:  Herberth Foss MD  5/31/2022 7:25 PM CDT  Workstation: [id number]    Signed: 05/31/22 1925 by Herberth Foss MD    Narrative:      INDICATION: Neuro deficit, acute, stroke suspected    EXAMINATION: CT BRAIN - CT HEAD WITHOUT IV CONTRAST    TECHNIQUE:    Multiple axial images were obtained of the head without  intravenous contrast. A radiation dose optimization technique was  used for this scan.  IV  Contrast dosage and agent: None.    COMPARISON: None  ____________________________________________    FINDINGS:      BRAIN PARENCHYMA:   No intra- or extra-axial hemorrhage. Grey white differentiation  is preserved. No intracranial mass or mass effect. Posterior  fossa structures are unremarkable.     CSF SPACES: Appropriate for age.  No hydrocephalus.      CALVARIUM, SKULL BASE, PARANASAL SINUSES AND MASTOID AIR CELLS:  Unremarkable     Aspects score: 10      Impression:        No acute intracranial process is identified.    Electronically signed by:  Herberth Foss MD  5/31/2022 6:57 PM CDT  Workstation: [id number]    XR Chest 1 View [[id number]] Collected: 05/31/22 1900     Updated: 05/31/22 1919    Narrative:      XR CHEST 1 VIEW    COMPARISONS: Single view chest dated April 4, 2022    ADDITIONAL PERTINENT HISTORY: TIA    FINDINGS:  Cardiomediastinal silhouette:  Moderate cardiomegaly. Patient  status post median sternotomy.    Pulmonary vasculature:  Negative.    Lung fields:  Marked elevation of the right hemidiaphragm.  Minimal bibasilar regions of scarring.    Pleural spaces: Negative.    Osseous structures:  Negative.    Surrounding soft tissues:  Surgical clips involving the left  upper quadrant.        Impression:      1. Continued cardiomegaly.  2. Continued elevation of the right hemidiaphragm.  3. No acute cardiopulmonary disease.    Electronically signed by:  Miguel Kyle MD  5/31/2022 7:17 PM CDT  Workstation: [id number]            Assessment:    Active Hospital Problems    Diagnosis    • Hypotension          Plan:  1. Echo with saline  2. Daily labs  3. Home med reconciliation  4.  VTE prophylaxis  5.  Optimize secondary prevention therapies      I confirmed that the patient's Advance Care Plan is present, code status is documented, or surrogate decision maker is listed in the patient's medical record.     I have utilized all available immediate resources to obtain, update, or review the patient's current  medications.     I discussed the patient's findings and my recommendations with: patient    Neeraj Dan MD    35 minutes of dedicated clinical time was devoted to this patient's admission H&P and acute medical management    Electronically signed by Neeraj Dan MD, 06/01/22, 8:18 AM CDT.

## 2022-06-01 NOTE — CONSULTS
[facility]   Teleneurology Note    Patient Name: Ariana Martinez  : 1960  MRN: [id number]  Primary Care Physician: Kimberly Ferguson APRN  Referring Site: [place]  Referring Provider: Jordy Godfrey MD    Subjective   Teleneurology Initial Data     Arrival Date Telestroke Site: 22 Arrival Time Telestroke Site:    Neurologist Evaluation Date: 22 Neurologist Evaluation Time:    Date Last Known Well: 22 Time Last Known Well: 09     History     Chief Complaint: 62-year-old right-handed white female with history of poorly controlled diabetes with hemoglobin of 11.3 was found to have word finding difficulties and confusion this morning around 9.  HPI: I was called to evaluate for possible stroke.  However the patient is able to tell me the correct month and repeat sentences after me and also was able to tell me that yesterday was memorial day.  She was able to follow one-step and two-step commands well.  No focal deficits were noted and the NIH stroke scale is 0.  No facial asymmetry is noted.  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    Stroke Risk Factors/ Pertinent Data     Stroke risk factors: diabetes, dyslipidemia, hypertension  Anticoagulants prior to arrival: none  Antiplatelets prior to arrival: none     Pre- Stroke Modified Manuel Scale     Pre-Stroke Modified [place] Scale: 1 - No significant disability despite symptoms.  Able to carry out all usual duties and activities.    NIH Stroke Scale     NIHSS Performed Date: 22 NIHSS Performed Time:    Interval: baseline  1a. Level of Consciousness: 0-->Alert, keenly responsive  1b. LOC Questions: 0-->Answers both questions correctly  1c. LOC Commands: 0-->Performs both tasks correctly  2. Best Gaze: 0-->Normal  3. Visual: 0-->No visual loss  4. Facial Palsy: 0-->Normal symmetrical movements  5a. Motor Arm, Left: 0-->No drift,  limb holds 90 (or 45) degrees for full 10 secs  5b. Motor Arm, Right: 0-->No drift, limb holds 90 (or 45) degrees for full 10 secs  6a. Motor Leg, Left: 0-->No drift, leg holds 30 degree position for full 5 secs  6b. Motor Leg, Right: 0-->No drift, leg holds 30 degree position for full 5 secs  7. Limb Ataxia: 0-->Absent  8. Sensory: 0-->Normal, no sensory loss  9. Best Language: 0-->No aphasia, normal  10. Dysarthria: 0-->Normal  11. Extinction and Inattention (formerly Neglect): 0-->No abnormality  Total (NIH Stroke Scale): 0     Review of Systems     Review of Systems   Constitutional: Positive for fatigue.   HENT: Negative.    Eyes: Negative.    Respiratory: Negative.    Cardiovascular: Negative.    Gastrointestinal: Negative.    Endocrine: Negative.    Genitourinary: Negative.    Musculoskeletal: Negative.    Skin: Negative.    Allergic/Immunologic: Negative.    Neurological: Negative.    Hematological: Negative.    Psychiatric/Behavioral: Negative.        Objective   Exam     Exam performed with the help of support staff from the referring site  Neurological Exam  Mental Status  Awake, alert and oriented to person, place and time. Oriented to person, place, time and situation. Recent and remote memory are intact. Speech is normal. Language is fluent with no aphasia. Attention and concentration are normal.    Cranial Nerves  CN I: Sense of smell is normal.  CN II: Visual acuity is normal. Visual fields full to confrontation.  CN III, IV, VI: Extraocular movements intact bilaterally. Normal lids and orbits bilaterally. Pupils equal round and reactive to light bilaterally.  CN V: Facial sensation is normal.  CN VII: Full and symmetric facial movement.  CN IX, X: Palate elevates symmetrically. Normal gag reflex.  CN XI: Shoulder shrug strength is normal.  CN XII: Tongue midline without atrophy or fasciculations.    Motor  Normal muscle bulk throughout. No fasciculations present. Normal muscle tone. Strength is 5/5  throughout all four extremities.  Patient has above the knee amputation for the left lower extremity..    Sensory  Sensation is intact to light touch, pinprick, vibration and proprioception in all four extremities.  Has length dependent sensory neuropathy from the diabetes..    Coordination    Finger-to-nose, rapid alternating movements and heel-to-shin normal bilaterally without dysmetria.    Gait    Unable to test gait or Romberg as the patient does not have the prosthesis for the left lower extremity..      Result Review    Results          Personal review of CNS imaging:(Official report by radiologist pending)  Imaging  CT Imaging Review: CT Imaging reviewed, NO acute infarct/ hemorrhage seen  CTA Imaging Review: CTA Imagng not available or not performed.  Patient could not get the CT angiogram because of elevated creatinine with low GFR.    Thrombolytic   Thrombolytics: not applicable     Assessment & Plan   Assessment/ Plan     Assessment:  Acute Stroke Evaluation: Stroke not suspected/ Other (Specify)- the risks of IV thrombolytic outweigh the benefits of treatment       Plan: Discussed with the patient and her  as well as Dr. Godfrey that it does not seem to be like an acute stroke.  Patient has a bladder stimulator and its not known whether this is MRI compatible or not.  However the patient will be admitted to the hospitalist service for further work-up.  Stroke work-up has also been requested including an echocardiogram and physical therapy and Occupational Therapy and speech pathology to evaluate her.  She should be getting an enteric-coated aspirin as well as atorvastatin 80 mg at bedtime and she will be also getting a hemoglobin A1c, lipid profile, vitamin B12, folic acid level and TSH levels for tomorrow morning.  She will be evaluated by our inpatient nurse practitioner for neurology Sung Guido William tomorrow.           Disposition     Disposition: The patient will remain at the referring  institution for further evaluation and management    Medical Decision Making  Medical Data Reviewed: Data reviewed including: clinical labs, radiology and/or medical tests, Independent review of CNS images  Length of visit: 60 minutes    ARIANA, Fernando Noriega MD, saw the patient on 05/31/22 at 2000 for an initial in-patient or emergency room telememedicine face to face consult using interactive technology for 60 minutes. The location of the patient was [place]. I was located at  [place], [place]. I was assisted with the exam by  the registered nurse.    I have proceeded with this evaluation at the request of the referring practitioner as it is felt to be an emergency setting and no appropriate specialist is available to perform this evaluation. The originating hospital has reported that this is the correct patient and has obtained consent from the patient/surrogate to perform this telemedicine evaluation(including obtaining history, performing examination and reviewing data provided by the patient an/or originating site of care provider)    I have introduced myself to the patient, provided my credentials, disclosed my location, and determined that, based on review of the patient's chart and discussion with the patient's primary team, telemedicine via a HIPAA compliant, real-time, face-to-face two-way, interactive audio and video platform is an appropriate and effective means of providing the service.    The patient/surrogate has a right to refuse this evaluation as they have been explained risks including potential loss of confidentiality, benefits, alternatives, and the potential need for subsequent face-to-face care. In this evaluation, we will be providing recommendations only.  The ultimate decision to follow or not to follow these recommendations will be left to the bedside treating/requesting practitioner.    The patient/surrogate has been notified that other healthcare professionals including  technical person may be involved in this A/V evaluation.  All laws concerning confidentiality and patient access to medical records and copies of medical records apply to telemedicine.  The patient/surrogate has received the originating site's Health Notice of Privacy Practices.    Fernando Noriega MD

## 2022-06-02 LAB
6MAM UR QL CFM: NEGATIVE
6MAM/CREAT UR: NOT DETECTED NG/MG CREAT
7AMINOCLONAZEPAM/CREAT UR: NOT DETECTED NG/MG CREAT
A-OH ALPRAZ/CREAT UR: NOT DETECTED NG/MG CREAT
A-OH-TRIAZOLAM/CREAT UR CFM: NOT DETECTED NG/MG CREAT
ALFENTANIL/CREAT UR CFM: NOT DETECTED NG/MG CREAT
ALPHA-HYDROXYMIDAZOLAM, URINE: NOT DETECTED NG/MG CREAT
ALPRAZ/CREAT UR CFM: NOT DETECTED NG/MG CREAT
AMOBARBITAL UR QL CFM: NOT DETECTED
AMPHET/CREAT UR: NOT DETECTED NG/MG CREAT
AMPHETAMINES UR QL CFM: NEGATIVE
ANION GAP SERPL CALCULATED.3IONS-SCNC: 10 MMOL/L (ref 5–15)
ANION GAP SERPL CALCULATED.3IONS-SCNC: 11 MMOL/L (ref 5–15)
BARBITAL UR QL CFM: NOT DETECTED
BARBITURATES UR QL CFM: NEGATIVE
BASOPHILS # BLD AUTO: 0.04 10*3/MM3 (ref 0–0.2)
BASOPHILS NFR BLD AUTO: 0.4 % (ref 0–1.5)
BENZODIAZ UR QL CFM: NORMAL
BUN SERPL-MCNC: 31 MG/DL (ref 8–23)
BUN SERPL-MCNC: 33 MG/DL (ref 8–23)
BUN/CREAT SERPL: 16.7 (ref 7–25)
BUN/CREAT SERPL: 17 (ref 7–25)
BUPRENORPHINE UR QL CFM: NEGATIVE
BUPRENORPHINE/CREAT UR: NOT DETECTED NG/MG CREAT
BUTABARBITAL UR QL CFM: NOT DETECTED
BUTALBITAL UR QL CFM: NOT DETECTED
BZE/CREAT UR: NOT DETECTED NG/MG CREAT
CALCIUM SPEC-SCNC: 10.2 MG/DL (ref 8.6–10.5)
CALCIUM SPEC-SCNC: 9.8 MG/DL (ref 8.6–10.5)
CANNABINOIDS UR QL CFM: NEGATIVE
CARBOXYTHC/CREAT UR: NOT DETECTED NG/MG CREAT
CHLORIDE SERPL-SCNC: 103 MMOL/L (ref 98–107)
CHLORIDE SERPL-SCNC: 103 MMOL/L (ref 98–107)
CLONAZEPAM/CREAT UR CFM: NOT DETECTED NG/MG CREAT
CO2 SERPL-SCNC: 20 MMOL/L (ref 22–29)
CO2 SERPL-SCNC: 22 MMOL/L (ref 22–29)
COCAETHYLENE/CREAT UR CFM: NOT DETECTED NG/MG CREAT
COCAINE UR QL CFM: NEGATIVE
COCAINE/CREAT UR CFM: NOT DETECTED NG/MG CREAT
CODEINE/CREAT UR: NOT DETECTED NG/MG CREAT
CREAT SERPL-MCNC: 1.82 MG/DL (ref 0.57–1)
CREAT SERPL-MCNC: 1.98 MG/DL (ref 0.57–1)
CREAT UR-MCNC: 26 MG/DL
DEPRECATED RDW RBC AUTO: 46 FL (ref 37–54)
DESALKYLFLURAZ/CREAT UR: NOT DETECTED NG/MG CREAT
DESMETHYLFLUNITRAZEPAM: NOT DETECTED NG/MG CREAT
DHC/CREAT UR: 396 NG/MG CREAT
DIAZEPAM/CREAT UR: NOT DETECTED NG/MG CREAT
DRUGS UR: NORMAL
EDDP/CREAT UR: NOT DETECTED NG/MG CREAT
EGFRCR SERPLBLD CKD-EPI 2021: 28.5 ML/MIN/1.73
EGFRCR SERPLBLD CKD-EPI 2021: 31.5 ML/MIN/1.73
EOSINOPHIL # BLD AUTO: 0.3 10*3/MM3 (ref 0–0.4)
EOSINOPHIL NFR BLD AUTO: 3.1 % (ref 0.3–6.2)
ERYTHROCYTE [DISTWIDTH] IN BLOOD BY AUTOMATED COUNT: 13.7 % (ref 12.3–15.4)
ETHANOL UR CFM-MCNC: 0.1 G/DL
ETHANOL UR QL CFM: NORMAL
FENTANYL UR QL CFM: NEGATIVE
FENTANYL/CREAT UR: NOT DETECTED NG/MG CREAT
FLUNITRAZEPAM UR QL CFM: NOT DETECTED NG/MG CREAT
GLUCOSE BLDC GLUCOMTR-MCNC: 330 MG/DL (ref 70–130)
GLUCOSE BLDC GLUCOMTR-MCNC: 350 MG/DL (ref 70–130)
GLUCOSE BLDC GLUCOMTR-MCNC: 353 MG/DL (ref 70–130)
GLUCOSE BLDC GLUCOMTR-MCNC: 370 MG/DL (ref 70–130)
GLUCOSE BLDC GLUCOMTR-MCNC: 419 MG/DL (ref 70–130)
GLUCOSE SERPL-MCNC: 313 MG/DL (ref 65–99)
GLUCOSE SERPL-MCNC: 365 MG/DL (ref 65–99)
HCT VFR BLD AUTO: 34.3 % (ref 34–46.6)
HGB BLD-MCNC: 11 G/DL (ref 12–15.9)
HYDROCODONE/CREAT UR: 1131 NG/MG CREAT
HYDROMORPHONE/CREAT UR: NOT DETECTED NG/MG CREAT
IMM GRANULOCYTES # BLD AUTO: 0.05 10*3/MM3 (ref 0–0.05)
IMM GRANULOCYTES NFR BLD AUTO: 0.5 % (ref 0–0.5)
LEVEL OF DETECTION:: NORMAL
LORAZEPAM/CREAT UR: 538 NG/MG CREAT
LYMPHOCYTES # BLD AUTO: 2.01 10*3/MM3 (ref 0.7–3.1)
LYMPHOCYTES NFR BLD AUTO: 20.6 % (ref 19.6–45.3)
MCH RBC QN AUTO: 29.7 PG (ref 26.6–33)
MCHC RBC AUTO-ENTMCNC: 32.1 G/DL (ref 31.5–35.7)
MCV RBC AUTO: 92.7 FL (ref 79–97)
MDA/CREAT UR: NOT DETECTED NG/MG CREAT
MDMA/CREAT UR: NOT DETECTED NG/MG CREAT
MEPHOBARBITAL UR QL CFM: NOT DETECTED
METHADONE UR QL CFM: NEGATIVE
METHADONE/CREAT UR: NOT DETECTED NG/MG CREAT
METHAMPHET/CREAT UR: NOT DETECTED NG/MG CREAT
MIDAZOLAM/CREAT UR CFM: NOT DETECTED NG/MG CREAT
MONOCYTES # BLD AUTO: 0.83 10*3/MM3 (ref 0.1–0.9)
MONOCYTES NFR BLD AUTO: 8.5 % (ref 5–12)
MORPHINE/CREAT UR: NOT DETECTED NG/MG CREAT
N-NORTRAMADOL/CREAT UR CFM: NOT DETECTED NG/MG CREAT
NARCOTICS UR: NEGATIVE
NEUTROPHILS NFR BLD AUTO: 6.51 10*3/MM3 (ref 1.7–7)
NEUTROPHILS NFR BLD AUTO: 66.9 % (ref 42.7–76)
NORBUPRENORPHINE/CREAT UR: NOT DETECTED NG/MG CREAT
NORCODEINE/CREAT UR CFM: NOT DETECTED NG/MG CREAT
NORDIAZEPAM/CREAT UR: NOT DETECTED NG/MG CREAT
NORFENTANYL/CREAT UR: NOT DETECTED NG/MG CREAT
NORHYDROCODONE/CREAT UR: 469 NG/MG CREAT
NORMORPHINE UR-MCNC: NOT DETECTED NG/MG CREAT
NOROXYCODONE/CREAT UR: NOT DETECTED NG/MG CREAT
NOROXYMORPHONE/CREAT UR CFM: NOT DETECTED NG/MG CREAT
NRBC BLD AUTO-RTO: 0 /100 WBC (ref 0–0.2)
O-NORTRAMADOL UR CFM-MCNC: NOT DETECTED NG/MG CREAT
OPIATES UR QL CFM: NORMAL
OXAZEPAM/CREAT UR: NOT DETECTED NG/MG CREAT
OXYCODONE UR QL CFM: NEGATIVE
OXYCODONE/CREAT UR: NOT DETECTED NG/MG CREAT
OXYMORPHONE/CREAT UR: NOT DETECTED NG/MG CREAT
PENTOBARB UR QL CFM: NOT DETECTED
PHENOBARB UR QL CFM: NOT DETECTED
PLATELET # BLD AUTO: 335 10*3/MM3 (ref 140–450)
PMV BLD AUTO: 9.7 FL (ref 6–12)
POTASSIUM SERPL-SCNC: 5.4 MMOL/L (ref 3.5–5.2)
POTASSIUM SERPL-SCNC: 5.7 MMOL/L (ref 3.5–5.2)
RBC # BLD AUTO: 3.7 10*6/MM3 (ref 3.77–5.28)
SECOBARBITAL UR QL CFM: NOT DETECTED
SODIUM SERPL-SCNC: 133 MMOL/L (ref 136–145)
SODIUM SERPL-SCNC: 136 MMOL/L (ref 136–145)
SUFENTANIL/CREAT UR CFM: NOT DETECTED NG/MG CREAT
TAPENTADOL UR QL CFM: NEGATIVE
TAPENTADOL/CREAT UR: NOT DETECTED NG/MG CREAT
TEMAZEPAM/CREAT UR: NOT DETECTED NG/MG CREAT
THIOPENTAL UR QL CFM: NOT DETECTED
TRAMADOL UR QL CFM: NOT DETECTED NG/MG CREAT
WBC NRBC COR # BLD: 9.74 10*3/MM3 (ref 3.4–10.8)
WHOLE BLOOD HOLD SPECIMEN: NORMAL

## 2022-06-02 PROCEDURE — 82962 GLUCOSE BLOOD TEST: CPT

## 2022-06-02 PROCEDURE — 92507 TX SP LANG VOICE COMM INDIV: CPT | Performed by: SPEECH-LANGUAGE PATHOLOGIST

## 2022-06-02 PROCEDURE — G0378 HOSPITAL OBSERVATION PER HR: HCPCS

## 2022-06-02 PROCEDURE — 25010000002 ENOXAPARIN PER 10 MG: Performed by: INTERNAL MEDICINE

## 2022-06-02 PROCEDURE — 97535 SELF CARE MNGMENT TRAINING: CPT

## 2022-06-02 PROCEDURE — 63710000001 INSULIN ASPART PER 5 UNITS: Performed by: INTERNAL MEDICINE

## 2022-06-02 PROCEDURE — 63710000001 INSULIN DETEMIR PER 5 UNITS: Performed by: INTERNAL MEDICINE

## 2022-06-02 PROCEDURE — 80048 BASIC METABOLIC PNL TOTAL CA: CPT | Performed by: INTERNAL MEDICINE

## 2022-06-02 PROCEDURE — 36415 COLL VENOUS BLD VENIPUNCTURE: CPT | Performed by: INTERNAL MEDICINE

## 2022-06-02 PROCEDURE — 96372 THER/PROPH/DIAG INJ SC/IM: CPT

## 2022-06-02 PROCEDURE — 85025 COMPLETE CBC W/AUTO DIFF WBC: CPT | Performed by: INTERNAL MEDICINE

## 2022-06-02 PROCEDURE — 97530 THERAPEUTIC ACTIVITIES: CPT

## 2022-06-02 PROCEDURE — 97110 THERAPEUTIC EXERCISES: CPT

## 2022-06-02 RX ORDER — CLOPIDOGREL BISULFATE 75 MG/1
75 TABLET ORAL DAILY
Status: DISCONTINUED | OUTPATIENT
Start: 2022-06-02 | End: 2022-06-03 | Stop reason: HOSPADM

## 2022-06-02 RX ORDER — INSULIN ASPART 100 [IU]/ML
8 INJECTION, SOLUTION INTRAVENOUS; SUBCUTANEOUS
Status: DISCONTINUED | OUTPATIENT
Start: 2022-06-02 | End: 2022-06-03 | Stop reason: HOSPADM

## 2022-06-02 RX ORDER — INSULIN ASPART 100 [IU]/ML
0-14 INJECTION, SOLUTION INTRAVENOUS; SUBCUTANEOUS
Status: DISCONTINUED | OUTPATIENT
Start: 2022-06-02 | End: 2022-06-03 | Stop reason: HOSPADM

## 2022-06-02 RX ORDER — NICOTINE POLACRILEX 4 MG
15 LOZENGE BUCCAL
Status: DISCONTINUED | OUTPATIENT
Start: 2022-06-02 | End: 2022-06-02

## 2022-06-02 RX ORDER — DEXTROSE MONOHYDRATE 25 G/50ML
25 INJECTION, SOLUTION INTRAVENOUS
Status: DISCONTINUED | OUTPATIENT
Start: 2022-06-02 | End: 2022-06-02

## 2022-06-02 RX ADMIN — Medication 10 ML: at 08:34

## 2022-06-02 RX ADMIN — RANOLAZINE 500 MG: 500 TABLET, FILM COATED, EXTENDED RELEASE ORAL at 21:56

## 2022-06-02 RX ADMIN — GABAPENTIN 400 MG: 400 CAPSULE ORAL at 21:57

## 2022-06-02 RX ADMIN — MONTELUKAST 10 MG: 10 TABLET, FILM COATED ORAL at 21:57

## 2022-06-02 RX ADMIN — RANOLAZINE 500 MG: 500 TABLET, FILM COATED, EXTENDED RELEASE ORAL at 08:33

## 2022-06-02 RX ADMIN — SODIUM CHLORIDE 50 ML/HR: 9 INJECTION, SOLUTION INTRAVENOUS at 15:21

## 2022-06-02 RX ADMIN — LORAZEPAM 0.5 MG: 0.5 TABLET ORAL at 21:57

## 2022-06-02 RX ADMIN — ENOXAPARIN SODIUM 30 MG: 30 INJECTION SUBCUTANEOUS at 21:54

## 2022-06-02 RX ADMIN — ISOSORBIDE MONONITRATE 30 MG: 30 TABLET, EXTENDED RELEASE ORAL at 08:32

## 2022-06-02 RX ADMIN — HYDROCODONE BITARTRATE AND ACETAMINOPHEN 1 TABLET: 7.5; 325 TABLET ORAL at 21:57

## 2022-06-02 RX ADMIN — INSULIN ASPART 12 UNITS: 100 INJECTION, SOLUTION INTRAVENOUS; SUBCUTANEOUS at 17:27

## 2022-06-02 RX ADMIN — LORAZEPAM 0.5 MG: 0.5 TABLET ORAL at 15:21

## 2022-06-02 RX ADMIN — CLOPIDOGREL BISULFATE 75 MG: 75 TABLET ORAL at 11:51

## 2022-06-02 RX ADMIN — PANTOPRAZOLE SODIUM 40 MG: 40 TABLET, DELAYED RELEASE ORAL at 08:32

## 2022-06-02 RX ADMIN — Medication 10 ML: at 21:55

## 2022-06-02 RX ADMIN — GABAPENTIN 400 MG: 400 CAPSULE ORAL at 08:32

## 2022-06-02 RX ADMIN — INSULIN ASPART 8 UNITS: 100 INJECTION, SOLUTION INTRAVENOUS; SUBCUTANEOUS at 11:52

## 2022-06-02 RX ADMIN — Medication 5000 UNITS: at 08:32

## 2022-06-02 RX ADMIN — ATORVASTATIN CALCIUM 80 MG: 40 TABLET, FILM COATED ORAL at 21:56

## 2022-06-02 RX ADMIN — INSULIN ASPART 12 UNITS: 100 INJECTION, SOLUTION INTRAVENOUS; SUBCUTANEOUS at 21:49

## 2022-06-02 RX ADMIN — INSULIN ASPART 12 UNITS: 100 INJECTION, SOLUTION INTRAVENOUS; SUBCUTANEOUS at 06:50

## 2022-06-02 RX ADMIN — INSULIN ASPART 10 UNITS: 100 INJECTION, SOLUTION INTRAVENOUS; SUBCUTANEOUS at 11:52

## 2022-06-02 RX ADMIN — INSULIN DETEMIR 24 UNITS: 100 INJECTION, SOLUTION SUBCUTANEOUS at 21:48

## 2022-06-02 RX ADMIN — ASPIRIN 325 MG: 325 TABLET ORAL at 08:32

## 2022-06-02 RX ADMIN — Medication 10 ML: at 21:54

## 2022-06-02 RX ADMIN — LORAZEPAM 0.5 MG: 0.5 TABLET ORAL at 06:46

## 2022-06-02 RX ADMIN — FOLIC ACID 1000 MCG: 1 TABLET ORAL at 09:45

## 2022-06-02 RX ADMIN — INSULIN ASPART 8 UNITS: 100 INJECTION, SOLUTION INTRAVENOUS; SUBCUTANEOUS at 17:28

## 2022-06-02 RX ADMIN — HYDROCODONE BITARTRATE AND ACETAMINOPHEN 1 TABLET: 7.5; 325 TABLET ORAL at 11:51

## 2022-06-02 NOTE — PLAN OF CARE
Problem: Adult Inpatient Plan of Care  Goal: Plan of Care Review  Outcome: Ongoing, Progressing  Flowsheets  Taken 6/2/2022 1332  Progress: improving  Plan of Care Reviewed With: patient  Taken 6/2/2022 0974  Progress: improving  Plan of Care Reviewed With: patient  Outcome Evaluation: Julee rahman'ed therapy. Pt sitting in bschair upon entry to room. Pt needing extra time 2* to pain.  T/f to toilet, back to chair no AD CGA. Pt completed toileting tasks with SBA. UB and LB bathing and dressing with SBA. Grooming SBA. Pt sitting in bschair all needs in reach. Continue OT POC.   Goal Outcome Evaluation:  Plan of Care Reviewed With: patient        Progress: improving  Outcome Evaluation: Julee rahman'ed therapy. Pt sitting in bschair upon entry to room. Pt needing extra time 2* to pain.  T/f to toilet, back to chair no AD CGA. Pt completed toileting tasks with SBA. UB and LB bathing and dressing with SBA. Grooming SBA. Pt sitting in bschair all needs in reach. Continue OT POC.

## 2022-06-02 NOTE — THERAPY TREATMENT NOTE
Patient Name: Ariana Martinez  : 1960    MRN: [id number]                              Today's Date: 2022       Admit Date: 2022    Visit Dx:     ICD-10-CM ICD-9-CM   1. Hypotension, unspecified hypotension type  I95.9 458.9   2. Stroke-like symptom  R29.90 781.99   3. Dysarthria  R47.1 784.51   4. Impaired mobility and ADLs  Z74.09 V49.89    Z78.9    5. Impaired functional mobility, balance, gait, and endurance  Z74.09 V49.89     Patient Active Problem List   Diagnosis   • 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, with long-term current use of insulin   • Closed nondisplaced fracture of fifth left metatarsal bone   • MAURICIO (generalized anxiety disorder)   • Depression, major, recurrent, moderate (HCC)   • GERD without esophagitis   • Long term prescription opiate use   • Mixed hyperlipidemia   • Vitamin D deficiency   • Seasonal allergic rhinitis   • Restrictive lung disease secondary to obesity   • Adult body mass index 37.0-37.9   • Snoring   • Class 3 severe obesity due to excess calories without serious comorbidity with body mass index (BMI) of 40.0 to 44.9 in adult ([facility])   • (HFpEF) heart failure with preserved ejection fraction ([facility])   • Type 2 diabetes mellitus with hyperglycemia, with long-term current use of insulin ([facility])   • Cyanocobalamin deficiency   • Weakness   • Coronary artery disease of native artery of native heart with stable angina pectoris ([facility])   • Hypertension   • Meniere's disease   • Gastroparesis   • Otitis media with effusion, left   • Pulmonary hypertension ([facility])   • Displaced fracture of fifth metatarsal bone, left foot, subsequent encounter for fracture with nonunion   • Pes cavus   • Primary osteoarthritis involving multiple joints   • Generalized anxiety disorder   • Chronic right-sided low back pain with right-sided sciatica   • Chest pain   • Thrombocytosis   • Leukocytosis   • Iron deficiency   • Adverse effect of iron   • Hindfoot varus,  acquired, left   • Chest pain due to myocardial ischemia   • Nonrheumatic tricuspid valve regurgitation   • Iron deficiency anemia due to chronic blood loss   • Malaise and fatigue   • Nonunion of subtalar arthrodesis   • Ankle arthritis   • Cellulitis of left lower extremity   • Urinary retention   • Acute bacterial endocarditis   • Staphylococcus aureus bacteremia   • Infection due to Acinetobacter species   • Endocarditis   • Left foot infection   • Uncontrolled hypertension   • Occlusion and stenosis of bilateral carotid arteries   • S/P BKA (below knee amputation) unilateral, left ([facility])   • Phantom pain after amputation of lower extremity ([facility])   • S/P CABG (coronary artery bypass graft)   • Acute colitis   • Severe malnutrition ([facility])   • Sepsis ([facility])   • TIA (transient ischemic attack)   • Chest pain, unspecified type   • Coronary artery abnormality   • Hypotension   • CHON (acute kidney injury) ([facility])   • Confusion     Past Medical History:   Diagnosis Date   • Angina, class IV ([facility])    • Anxiety    • Anxiety and depression    • Arthritis    • Benign paroxysmal positional vertigo    • Bladder disorder     has bladder stimulator   • Carpal tunnel syndrome    • CHF (congestive heart failure) ([facility])    • Chronic pain    • Coronary atherosclerosis     hx CABG 2005.  is followed by Dr Kwon   • Depression    • Diabetes mellitus ([facility])     Type 2, controlled   • Diabetic polyneuropathy ([facility])    • Disease of thyroid gland    • Elevated cholesterol    • Female stress incontinence    • Foot pain, left    • Full dentures    • Gastroparesis    • GERD (gastroesophageal reflux disease)    • Hyperlipidemia    • Hypertension    • Low back pain    • Malaise and fatigue    • Multiple joint pain    • Obesity     Refuses to be weighed   • Occlusion and stenosis of bilateral carotid arteries 6/18/2021   • Otalgia     Both   • Perforation of tympanic membrane     Left   • Postoperative wound infection    • Vitamin D deficiency     • Wears glasses     reading     Past Surgical History:   Procedure Laterality Date   • ABDOMINAL SURGERY     • AMPUTATION FOOT     • ANGIOPLASTY      coronary   • ANKLE ARTHROSCOPY Left 2/26/2021    Procedure: Left foot hardware removal, ankle arthroscopy, bone grafting , left foot exostectomy;  Surgeon: Ignacio Lord DPM;  Location: [facility] OR;  Service: Podiatry;  Laterality: Left;   • BREAST BIOPSY Right    • CARDIAC CATHETERIZATION     • CARDIAC CATHETERIZATION N/A 6/20/2017    Procedure: Right Heart Cath;  Surgeon: Can Kwon MD PhD;  Location: [facility] CATH INVASIVE LOCATION;  Service:    • CARDIAC CATHETERIZATION N/A 2/18/2020    Procedure: Left Heart Cath;  Surgeon: Catalina Cooper MD;  Location: [facility] CATH INVASIVE LOCATION;  Service: Cardiology;  Laterality: N/A;   • CARDIAC CATHETERIZATION N/A 4/6/2022    Procedure: Left Heart Cath;  Surgeon: Sheryl Navas MD;  Location: [facility] CATH INVASIVE LOCATION;  Service: Cardiology;  Laterality: N/A;   • CARPAL TUNNEL RELEASE     • CHOLECYSTECTOMY     • COLONOSCOPY N/A 6/24/2020    Procedure: COLONOSCOPY;  Surgeon: Julián Maldonado MD;  Location: [facility] ENDOSCOPY;  Service: Gastroenterology;  Laterality: N/A;   • CORONARY ARTERY BYPASS GRAFT  2005   • ENDOSCOPY N/A 10/19/2018    Procedure: ESOPHAGOGASTRODUODENOSCOPY possible dilation;  Surgeon: Julián Maldonado MD;  Location: [facility] ENDOSCOPY;  Service: Gastroenterology   • ENDOSCOPY N/A 6/24/2020    Procedure: ESOPHAGOGASTRODUODENOSCOPY WED appt please;  Surgeon: Julián Maldonado MD;  Location: [facility] ENDOSCOPY;  Service: Gastroenterology;  Laterality: N/A;   • ENDOSCOPY AND COLONOSCOPY     • FOOT SURGERY      Toes   • FOOT SURGERY     • GASTRIC BANDING      Revision, laparoscopic   • HYSTERECTOMY     • INCISION AND DRAINAGE LEG Left 3/12/2021    Procedure: Left ankle arthroscopic irrigation and debridement, screw removal;  Surgeon: Ignacio Lord DPM;  Location: [facility] OR;  Service:  Podiatry;  Laterality: Left;   • MOUTH SURGERY     • SALPINGO OOPHORECTOMY     • SHOULDER SURGERY     • SUBTALAR ARTHRODESIS Left 1/16/2019    Procedure: LEFT FOOT HARDWARE REMOVAL, FIFTH METATARSAL , OPEN REDUCTION INTERNAL FIXATION, CALCANEAL OSTEOTOMY;  Surgeon: Ignacio Lord DPM;  Location: [facility];  Service: Podiatry   • SUBTALAR ARTHRODESIS Left 10/16/2019    Procedure: foot hardware removal, subtalar joint fusion  possible de/reattachment of achilles tendon        (c-arm);  Surgeon: Ignacio Lord DPM;  Location: [facility];  Service: Podiatry   • SUBTALAR ARTHRODESIS Left 9/30/2020    Procedure: subtalar, talonavicular joint arthrodesis.  Removal hardware.          (c-arm);  Surgeon: Ignacio Lord DPM;  Location: [facility];  Service: Podiatry;  Laterality: Left;   • TRANSESOPHAGEAL ECHOCARDIOGRAM (LAMONTE)      With color flow      General Information     Row Name 06/02/22 0945          OT Time and Intention    Document Type therapy note (daily note)  -LW     Mode of Treatment individual therapy;occupational therapy  -     Row Name 06/02/22 0945          General Information    Patient Profile Reviewed yes  -LW     Existing Precautions/Restrictions fall  -     Row Name 06/02/22 0945          Cognition    Orientation Status (Cognition) oriented x 4  -LW           User Key  (r) = Recorded By, (t) = Taken By, (c) = Cosigned By    Initials Name Provider Type    LW Sarah Irby COTA Occupational Therapist Assistant                 Mobility/ADL's     Row Name 06/02/22 0945          Transfers    Transfers sit-stand transfer;stand-sit transfer;toilet transfer  -LW     Sit-Stand [place] (Transfers) contact guard  -LW     Stand-Sit [place] (Transfers) contact guard  -LW     [place] Level (Toilet Transfer) contact guard;1 person to manage equipment  -LW     Assistive Device (Toilet Transfer) other (see comments);commode, bedside without drop arms  No AE  -LW     Row Name 06/02/22  0945          Sit-Stand Transfer    Assistive Device (Sit-Stand Transfers) other (see comments)  No AE  -LW     Row Name 06/02/22 0945          Stand-Sit Transfer    Assistive Device (Stand-Sit Transfers) other (see comments)  No AE  -LW     Row Name 06/02/22 0945          Toilet Transfer    Type (Toilet Transfer) stand pivot/stand step  -LW     Row Name 06/02/22 0945          Activities of Daily Living    BADL Assessment/Intervention bathing;upper body dressing;lower body dressing;grooming;toileting  -LW     Row Name 06/02/22 0945          Toileting Assessment/Training    [place] Level (Toileting) adjust/manage clothing;perform perineal hygiene;contact guard assist  -LW     Assistive Devices (Toileting) commode, bedside without drop arms  -LW     Position (Toileting) supported sitting  -LW     Row Name 06/02/22 0945          Bathing Assessment/Intervention    [place] Level (Bathing) lower body;upper body;upper extremities;chest/trunk;distal lower extremities/feet;proximal lower extremities;perineal area;standby assist  -LW     Position (Bathing) supported sitting  -LW     Row Name 06/02/22 0945          Upper Body Dressing Assessment/Training    [place] Level (Upper Body Dressing) doff;don;standby assist  [facility] gown  -LW     Position (Upper Body Dressing) supported sitting  -LW     Row Name 06/02/22 0945          Lower Body Dressing Assessment/Training    [place] Level (Lower Body Dressing) doff;don;socks;standby assist  -LW     Position (Lower Body Dressing) supported sitting  -LW     Row Name 06/02/22 0945          Grooming Assessment/Training    [place] Level (Grooming) hair care, combing/brushing;oral care regimen;wash face, hands;standby assist  -LW     Position (Grooming) supported sitting  -LW           User Key  (r) = Recorded By, (t) = Taken By, (c) = Cosigned By    Initials Name Provider Type    Sarah Davies COTA Occupational Therapist Assistant                Obj/Interventions    No documentation.                Goals/Plan     Row Name 06/02/22 0945          Transfer Goal 1 (OT)    Activity/Assistive Device (Transfer Goal 1, OT) toilet  -LW     [place] Level/Cues Needed (Transfer Goal 1, OT) supervision required  -LW     Time Frame (Transfer Goal 1, OT) long term goal (LTG);by discharge  -LW     Strategies/Barriers (Transfers Goal 1, OT) BS  -LW     Progress/Outcome (Transfer Goal 1, OT) goal not met  -     Row Name 06/02/22 0945          Bathing Goal 1 (OT)    Activity/Device (Bathing Goal 1, OT) lower body bathing  -LW     [place] Level/Cues Needed (Bathing Goal 1, OT) contact guard required  -LW     Time Frame (Bathing Goal 1, OT) long term goal (LTG);by discharge  -LW     Progress/Outcomes (Bathing Goal 1, OT) goal not met  -     Row Name 06/02/22 0945          Dressing Goal 1 (OT)    Activity/Device (Dressing Goal 1, OT) lower body dressing  -LW     [place]/Cues Needed (Dressing Goal 1, OT) contact guard required  -LW     Time Frame (Dressing Goal 1, OT) long term goal (LTG);by discharge  -LW     Progress/Outcome (Dressing Goal 1, OT) goal not met  -LW           User Key  (r) = Recorded By, (t) = Taken By, (c) = Cosigned By    Initials Name Provider Type    LW aSrah Irby COTA Occupational Therapist Assistant               Clinical Impression     Row Name 06/02/22 09          Pain Assessment    Pretreatment Pain Rating 7/10  -LW     Posttreatment Pain Rating 7/10  -LW     Pain Location - head  -LW     Pain Intervention(s) Medication (See MAR)  -     Row Name 06/02/22 0945          Plan of Care Review    Plan of Care Reviewed With patient  -LW     Progress improving  -LW     Outcome Evaluation Nsg ok'ed therapy. Pt sitting in bschair upon entry to room. Pt needing extra time 2* to pain.  T/f to toilet, back to chair no AD CGA. Pt completed toileting tasks with SBA. UB and LB bathing and dressing with SBA. Grooming SBA. Pt sitting in  bschair all needs in reach. Continue OT POC.  -LW     Row Name 06/02/22 0945          Vital Signs    Intra Systolic BP Rehab 149  -LW     Intra Treatment Diastolic BP 69  -LW     Intratreatment Resp Rate (breaths/min) 77  -LW     Intra SpO2 (%) 99  -LW     Row Name 06/02/22 0945          Positioning and Restraints    Pre-Treatment Position sitting in chair/recliner  -LW     Post Treatment Position chair  -LW     In Chair reclined;call light within reach;encouraged to call for assist;notified nsg  -LW           User Key  (r) = Recorded By, (t) = Taken By, (c) = Cosigned By    Initials Name Provider Type    Sarah Davies COTA Occupational Therapist Assistant               Outcome Measures     Row Name 06/02/22 0945          How much help from another is currently needed...    Putting on and taking off regular lower body clothing? 3  -LW     Bathing (including washing, rinsing, and drying) 3  -LW     Toileting (which includes using toilet bed pan or urinal) 3  -LW     Putting on and taking off regular upper body clothing 4  -LW     Taking care of personal grooming (such as brushing teeth) 4  -LW     Eating meals 4  -LW     AM-PAC 6 Clicks Score (OT) 21  -LW           User Key  (r) = Recorded By, (t) = Taken By, (c) = Cosigned By    Initials Name Provider Type    Sarah Davies COTA Occupational Therapist Assistant                Occupational Therapy Education                 Title: PT OT SLP Therapies (Done)     Topic: Occupational Therapy (Done)     Point: ADL training (Done)     Description:   Instruct learner(s) on proper safety adaptation and remediation techniques during self care or transfers.   Instruct in proper use of assistive devices.              Learning Progress Summary           Patient Acceptance, E,TB, VU by KIMBERLY at 6/2/2022 1332                   Point: Home exercise program (Done)     Description:   Instruct learner(s) on appropriate technique for monitoring, assisting and/or progressing  therapeutic exercises/activities.              Learning Progress Summary           Patient Acceptance, E,TB, VU by LW at 6/2/2022 1332                   Point: Precautions (Done)     Description:   Instruct learner(s) on prescribed precautions during self-care and functional transfers.              Learning Progress Summary           Patient Acceptance, E,TB, VU by LW at 6/2/2022 1332    Acceptance, E, VU by ME at 6/1/2022 1526    Comment: Educated on OT and POC. Educated to call for assistance. Educated on safety precautions.                   Point: Body mechanics (Done)     Description:   Instruct learner(s) on proper positioning and spine alignment during self-care, functional mobility activities and/or exercises.              Learning Progress Summary           Patient Acceptance, E,TB, VU by LW at 6/2/2022 1332    Acceptance, E, VU by ME at 6/1/2022 1526    Comment: Educated on OT and POC. Educated to call for assistance. Educated on safety precautions.                               User Key     Initials Effective Dates Name Provider Type Discipline     06/16/21 -  Sarah Irby COTA Occupational Therapist Assistant OT    ME 06/16/21 -  Severiano Pettit OTR/L Occupational Therapist OT              OT Recommendation and Plan     Plan of Care Review  Plan of Care Reviewed With: patient  Progress: improving  Outcome Evaluation: Nsg ok'ed therapy. Pt sitting in bschair upon entry to room. Pt needing extra time 2* to pain.  T/f to toilet, back to chair no AD CGA. Pt completed toileting tasks with SBA. UB and LB bathing and dressing with SBA. Grooming SBA. Pt sitting in bschair all needs in reach. Continue OT POC.     Time Calculation:    Time Calculation- OT     Row Name 06/02/22 0945             Time Calculation- OT    OT Start Time 0945  -LW      OT Stop Time 1055  -LW      OT Time Calculation (min) 70 min  -LW      Total Timed Code Minutes- OT 70 minute(s)  -LW      OT Received On 06/02/22  -LW               Timed Charges    22292 - OT Self Care/Mgmt Minutes 70  -LW              Total Minutes    Timed Charges Total Minutes 70  -LW       Total Minutes 70  -LW            User Key  (r) = Recorded By, (t) = Taken By, (c) = Cosigned By    Initials Name Provider Type    Sarah Davies COTA Occupational Therapist Assistant              Therapy Charges for Today     Code Description Service Date Service Provider Modifiers Qty    [id number] HC OT SELF CARE/MGMT/TRAIN EA 15 MIN 6/2/2022 Sarah Irby COTA GO 5               LINDA Booth  6/2/2022

## 2022-06-02 NOTE — PLAN OF CARE
Goal Outcome Evaluation:  Plan of Care Reviewed With: patient        Progress: improving  Outcome Evaluation: Pt agreeable to therapy. Nsg okayed pt to get OOB. Pt able to perform sup to sit with min of 1 and stood with min of 1 using RW. Pt performed stand pivot to recliner with min of 1 and sat down in recliner. Pt performed B LE ther ex as listed while sitting in recliner and left up in recliner after tx. Pt would cont to benefit from therapy upon DC.

## 2022-06-02 NOTE — THERAPY TREATMENT NOTE
Acute Care - Speech Language Pathology Treatment Note  [facility]     Patient Name: Ariana Martinez  : 1960  MRN: [id number]  Today's Date: 2022               Admit Date: 2022     Visit Dx:    ICD-10-CM ICD-9-CM   1. Hypotension, unspecified hypotension type  I95.9 458.9   2. Stroke-like symptom  R29.90 781.99   3. Dysarthria  R47.1 784.51   4. Impaired mobility and ADLs  Z74.09 V49.89    Z78.9    5. Impaired functional mobility, balance, gait, and endurance  Z74.09 V49.89     Patient Active Problem List   Diagnosis   • 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, with long-term current use of insulin   • Closed nondisplaced fracture of fifth left metatarsal bone   • MAURICIO (generalized anxiety disorder)   • Depression, major, recurrent, moderate (HCC)   • GERD without esophagitis   • Long term prescription opiate use   • Mixed hyperlipidemia   • Vitamin D deficiency   • Seasonal allergic rhinitis   • Restrictive lung disease secondary to obesity   • Adult body mass index 37.0-37.9   • Snoring   • Class 3 severe obesity due to excess calories without serious comorbidity with body mass index (BMI) of 40.0 to 44.9 in adult ([facility])   • (HFpEF) heart failure with preserved ejection fraction ([facility])   • Type 2 diabetes mellitus with hyperglycemia, with long-term current use of insulin ([facility])   • Cyanocobalamin deficiency   • Weakness   • Coronary artery disease of native artery of native heart with stable angina pectoris ([facility])   • Hypertension   • Meniere's disease   • Gastroparesis   • Otitis media with effusion, left   • Pulmonary hypertension ([facility])   • Displaced fracture of fifth metatarsal bone, left foot, subsequent encounter for fracture with nonunion   • Pes cavus   • Primary osteoarthritis involving multiple joints   • Generalized anxiety disorder   • Chronic right-sided low back pain with right-sided sciatica   • Chest pain   • Thrombocytosis   • Leukocytosis   • Iron  deficiency   • Adverse effect of iron   • Hindfoot varus, acquired, left   • Chest pain due to myocardial ischemia   • Nonrheumatic tricuspid valve regurgitation   • Iron deficiency anemia due to chronic blood loss   • Malaise and fatigue   • Nonunion of subtalar arthrodesis   • Ankle arthritis   • Cellulitis of left lower extremity   • Urinary retention   • Acute bacterial endocarditis   • Staphylococcus aureus bacteremia   • Infection due to Acinetobacter species   • Endocarditis   • Left foot infection   • Uncontrolled hypertension   • Occlusion and stenosis of bilateral carotid arteries   • S/P BKA (below knee amputation) unilateral, left ([facility])   • Phantom pain after amputation of lower extremity ([facility])   • S/P CABG (coronary artery bypass graft)   • Acute colitis   • Severe malnutrition ([facility])   • Sepsis ([facility])   • TIA (transient ischemic attack)   • Chest pain, unspecified type   • Coronary artery abnormality   • Hypotension   • CHON (acute kidney injury) ([facility])   • Confusion     Past Medical History:   Diagnosis Date   • Angina, class IV ([facility])    • Anxiety    • Anxiety and depression    • Arthritis    • Benign paroxysmal positional vertigo    • Bladder disorder     has bladder stimulator   • Carpal tunnel syndrome    • CHF (congestive heart failure) ([facility])    • Chronic pain    • Coronary atherosclerosis     hx CABG 2005.  is followed by Dr Kwon   • Depression    • Diabetes mellitus ([facility])     Type 2, controlled   • Diabetic polyneuropathy ([facility])    • Disease of thyroid gland    • Elevated cholesterol    • Female stress incontinence    • Foot pain, left    • Full dentures    • Gastroparesis    • GERD (gastroesophageal reflux disease)    • Hyperlipidemia    • Hypertension    • Low back pain    • Malaise and fatigue    • Multiple joint pain    • Obesity     Refuses to be weighed   • Occlusion and stenosis of bilateral carotid arteries 6/18/2021   • Otalgia     Both   • Perforation of tympanic membrane     Left   •  Postoperative wound infection    • Vitamin D deficiency    • Wears glasses     reading     Past Surgical History:   Procedure Laterality Date   • ABDOMINAL SURGERY     • AMPUTATION FOOT     • ANGIOPLASTY      coronary   • ANKLE ARTHROSCOPY Left 2/26/2021    Procedure: Left foot hardware removal, ankle arthroscopy, bone grafting , left foot exostectomy;  Surgeon: Ignacio Lord DPM;  Location: [facility] OR;  Service: Podiatry;  Laterality: Left;   • BREAST BIOPSY Right    • CARDIAC CATHETERIZATION     • CARDIAC CATHETERIZATION N/A 6/20/2017    Procedure: Right Heart Cath;  Surgeon: Can Kwon MD PhD;  Location: [facility] CATH INVASIVE LOCATION;  Service:    • CARDIAC CATHETERIZATION N/A 2/18/2020    Procedure: Left Heart Cath;  Surgeon: Catalina Cooper MD;  Location: [facility] CATH INVASIVE LOCATION;  Service: Cardiology;  Laterality: N/A;   • CARDIAC CATHETERIZATION N/A 4/6/2022    Procedure: Left Heart Cath;  Surgeon: Sheryl Navas MD;  Location: [facility] CATH INVASIVE LOCATION;  Service: Cardiology;  Laterality: N/A;   • CARPAL TUNNEL RELEASE     • CHOLECYSTECTOMY     • COLONOSCOPY N/A 6/24/2020    Procedure: COLONOSCOPY;  Surgeon: Julián Maldonado MD;  Location: [facility] ENDOSCOPY;  Service: Gastroenterology;  Laterality: N/A;   • CORONARY ARTERY BYPASS GRAFT  2005   • ENDOSCOPY N/A 10/19/2018    Procedure: ESOPHAGOGASTRODUODENOSCOPY possible dilation;  Surgeon: Julián Maldonado MD;  Location: [facility] ENDOSCOPY;  Service: Gastroenterology   • ENDOSCOPY N/A 6/24/2020    Procedure: ESOPHAGOGASTRODUODENOSCOPY WED appt please;  Surgeon: Julián Maldonado MD;  Location: [facility] ENDOSCOPY;  Service: Gastroenterology;  Laterality: N/A;   • ENDOSCOPY AND COLONOSCOPY     • FOOT SURGERY      Toes   • FOOT SURGERY     • GASTRIC BANDING      Revision, laparoscopic   • HYSTERECTOMY     • INCISION AND DRAINAGE LEG Left 3/12/2021    Procedure: Left ankle arthroscopic irrigation and debridement, screw removal;  Surgeon:  Ignacio Lord DPM;  Location: [facility];  Service: Podiatry;  Laterality: Left;   • MOUTH SURGERY     • SALPINGO OOPHORECTOMY     • SHOULDER SURGERY     • SUBTALAR ARTHRODESIS Left 1/16/2019    Procedure: LEFT FOOT HARDWARE REMOVAL, FIFTH METATARSAL , OPEN REDUCTION INTERNAL FIXATION, CALCANEAL OSTEOTOMY;  Surgeon: Ignacio Lord DPM;  Location: [facility] OR;  Service: Podiatry   • SUBTALAR ARTHRODESIS Left 10/16/2019    Procedure: foot hardware removal, subtalar joint fusion  possible de/reattachment of achilles tendon        (c-arm);  Surgeon: Ignacio Lord DPM;  Location: [facility] OR;  Service: Podiatry   • SUBTALAR ARTHRODESIS Left 9/30/2020    Procedure: subtalar, talonavicular joint arthrodesis.  Removal hardware.          (c-arm);  Surgeon: Ignacio Lord DPM;  Location: [facility];  Service: Podiatry;  Laterality: Left;   • TRANSESOPHAGEAL ECHOCARDIOGRAM ([facility])      With color flow       SLP Recommendation and Plan              Anticipated Discharge Disposition (SLP): unknown (06/02/22 0853)        Predicted Duration Therapy Intervention (Days): 2 days (06/02/22 0853)  Daily Summary of Progress (SLP): progress toward functional goals as expected (06/02/22 0853)           Treatment Assessment (SLP): ST: Pt states she has not returned to normal speech as of yet.  she is more alert this date and able to partipate in tx strategies for speech agility drills.  she requires mod cues to complete tasks correctly.  3HO were left in the room for pt to complete 2x a day. (06/02/22 0853)  Plan for Continued Treatment (SLP): continue treatment per plan of care (06/02/22 0853)  Plan of Care Reviewed With: patient (06/02/22 0934)  Progress: improving (06/02/22 0934)  Outcome Evaluation: Pt states she has not returned to normal speech as of yet.  she is more alert this date and able to partipate in tx strategies for speech agility drills.  she requires mod cues to complete tasks correctly.  3HO were left in  the room for pt to complete 2x a day. (06/02/22 0989)      SLP EVALUATION (last 72 hours)     SLP SLC Evaluation     Row Name 06/02/22 0853 06/01/22 7170                Communication Assessment/Intervention    Document Type therapy note (daily note)  -EC evaluation  -CK       Total Evaluation Minutes, SLP -- 24  -CK       Subjective Information no complaints  -EC complains of;fatigue;weakness  -CK       Patient Observations alert;cooperative;agree to therapy  -EC lethargic;agree to therapy  -CK       Patient/Family/Caregiver Comments/Observations -- Pt's  at bedside, but left room as eval began  -CK       Patient Effort adequate  -EC fair  -CK                General Information    Patient Profile Reviewed yes  -EC yes  -CK       Pertinent History Of Current Problem -- Pt admitted w/stroke-like symptoms of confusion and slurred speech.  -CK       Precautions/Limitations, Vision corrective lenses needed for reading  -EC corrective lenses needed for reading  -CK       Precautions/Limitations, Hearing WFL;for purposes of eval  -EC WFL;for purposes of eval  -CK       Patient Level of Education -- Master's degree; educator  -CK       Plans/Goals Discussed with patient;agreed upon  -EC patient;agreed upon  -CK       Patient's Goals for Discharge patient did not state  -EC patient did not state  -CK       Family Goals for Discharge family did not state  -EC family did not state  -CK                Pain    Additional Documentation -- Pain Scale: Numbers Pre/Post-Treatment (Group)  -CK                Pain Scale: Numbers Pre/Post-Treatment    Pretreatment Pain Rating 9/10  -EC 0/10 - no pain  -CK       Posttreatment Pain Rating 9/10  -EC 0/10 - no pain  -CK       Pain Location - head  -EC --       Pain Intervention(s) Nursing Notified  -EC --                Oral Motor Structure and Function    Oral Motor Structure and Function WFL  -EC WFL  -CK       Dentition Assessment upper dentures/partial in place;lower  dentures/partial in place  -EC upper dentures/partial in place;lower dentures/partial in place  -CK       Mucosal Quality moist, healthy  -EC moist, healthy  -CK                Motor Speech Assessment/Intervention    Motor Speech Function unfamiliar listener;WFL  -EC mild impairment;unfamiliar listener  -CK       Characteristics Consistent with Dysarthria slow rate  -EC slurred speech  -CK       Motor Speech, Comment SLP assessed dysarthria this date.  pt with dentures inplace and bed in upright position.  educated pt on correct body position for HEP.  Analyzed pt during production of speech agility drills and provided mod cues for corrective actions.  -EC Pt seen for skilled ST services this date to assess c/o slurred speech.  Pt presented as very lethargic, being unable to remain awake for even short periods of time.  Pt would fall asleep if not constantly actively engaged and still would leave eyes closed.  Pt's level of alertness impacted the ability to fully complete dysarthria assessment.  SLP did attempt to administer informal dysarthria assessment and gathered oral musculature to be WFL and oral agility to be at 70%.  SLP does feel that pt's lethary is a large barrier to intelligible speech vs true motor impairment as pt would demonstrate clear speech when eyes were open and moments of true alertness were achieved.  SLP recommends f/u w/pt to establish HEP and to allow for pt's lethargy to improve to r/o barrier.  SLP educated pt on results and recommendations and pt was in agreement.  -CK                SLP Evaluation Clinical Impressions    SLP Diagnosis -- mild dysarthria  -CK       Rehab Potential/Prognosis -- good  -CK       SLC Criteria for Skilled Therapy Interventions Met -- yes  -CK       Functional Impact -- functional impact in social situations  -CK                SLP Treatment Clinical Impressions    Treatment Assessment (SLP) ST: Pt states she has not returned to normal speech as of yet.  she  is more alert this date and able to partipate in tx strategies for speech agility drills.  she requires mod cues to complete tasks correctly.  3HO were left in the room for pt to complete 2x a day.  -EC --       Daily Summary of Progress (SLP) progress toward functional goals as expected  -EC --       Plan for Continued Treatment (SLP) continue treatment per plan of care  -EC --       Care Plan Review evaluation/treatment results reviewed;risks/benefits reviewed;care plan/treatment goals reviewed;current/potential barriers reviewed;patient/other agree to care plan  -EC evaluation/treatment results reviewed;care plan/treatment goals reviewed;risks/benefits reviewed;current/potential barriers reviewed;patient/other agree to care plan  -CK                Recommendations    Therapy Frequency (SLP SLC) 1 day per week;2 days per week  -EC 1 day per week;2 days per week  -CK       Predicted Duration Therapy Intervention (Days) 2 days  -EC 2 days  -CK       Anticipated Discharge Disposition (SLP) unknown  -EC unknown  -CK                Communication Treatment Objective and Progress Goals (SLP)    Motor Speech/Dysarthria Treatment Objectives Motor Speech/Dysarthria Treatment Objectives (Group)  -EC --                Motor Speech/Dysarthria Treatment Objectives    Articulation Selection articulation, SLP goal 1  -EC --                Articulation Goal 1 (SLP)    Improve Articulation Goal 1 (SLP) by over-articulating at word level;by over-articulating at phrase level  -EC --       Time Frame (Articulation Goal 1, SLP) 3 days  -EC --       Barriers (Articulation Goal 1, SLP) slow rate  -EC --       Progress (Articulation Goal 1, SLP) 80%;with moderate cues (50-74%)  -EC --       Progress/Outcomes (Articulation Goal 1, SLP) goal ongoing  -EC --       Comment (Articulation Goal 1, SLP) pt performed speech agility drills for /pa/, /ta/,  /ka/  /pataka/, D words, T words, ending K words, and tongue twister phrase level.  performed  each 10 times with mod cues.  -EC --             User Key  (r) = Recorded By, (t) = Taken By, (c) = Cosigned By    Initials Name Effective Dates    Mine Cifuentes CCC-SLP 06/16/21 -     CK Bessie Lomeli, MS CCC-SLP 06/16/21 -                    EDUCATION  The patient has been educated in the following areas:     dysarthria; speech agility drills with HEP.           SLP GOALS     Row Name 06/02/22 0853             Articulation Goal 1 (SLP)    Improve Articulation Goal 1 (SLP) by over-articulating at word level;by over-articulating at phrase level  -EC      Time Frame (Articulation Goal 1, SLP) 3 days  -EC      Barriers (Articulation Goal 1, SLP) slow rate  -EC      Progress (Articulation Goal 1, SLP) 80%;with moderate cues (50-74%)  -EC      Progress/Outcomes (Articulation Goal 1, SLP) goal ongoing  -EC      Comment (Articulation Goal 1, SLP) pt performed speech agility drills for /pa/, /ta/,  /ka/  /pataka/, D words, T words, ending K words, and tongue twister phrase level.  performed each 10 times with mod cues.  -EC            User Key  (r) = Recorded By, (t) = Taken By, (c) = Cosigned By    Initials Name Provider Type    Mine Cifuentes CCC-SLP Speech and Language Pathologist                        Time Calculation:      Time Calculation- SLP     Row Name 06/02/22 0934             Time Calculation- SLP    SLP Start Time 0853  -EC      SLP Stop Time 0917  -EC      SLP Time Calculation (min) 24 min  -EC      Total Timed Code Minutes- SLP 24 minute(s)  -EC      SLP Received On 06/02/22  -EC      SLP Goal Re-Cert Due Date 06/15/22  -EC            User Key  (r) = Recorded By, (t) = Taken By, (c) = Cosigned By    Initials Name Provider Type    Mine Cifuentes CCC-SLP Speech and Language Pathologist                Therapy Charges for Today     Code Description Service Date Service Provider Modifiers Qty    [id number]  ST TREATMENT SPEECH 2 6/2/2022 Mine Brunner CCC-[facility]CHUY GN 1                      Mine Brunner, CCC-SLP  6/2/2022

## 2022-06-02 NOTE — PLAN OF CARE
Goal Outcome Evaluation:  Plan of Care Reviewed With: patient        Progress: improving  Outcome Evaluation: Pt states she has not returned to normal speech as of yet.  she is more alert this date and able to partipate in tx strategies for speech agility drills.  she requires mod cues to complete tasks correctly.  3HO were left in the room for pt to complete 2x a day.

## 2022-06-02 NOTE — THERAPY TREATMENT NOTE
Acute Care - Physical Therapy Treatment Note  [facility]     Patient Name: Ariana Martinez  : 1960  MRN: [id number]  Today's Date: 2022      Visit Dx:     ICD-10-CM ICD-9-CM   1. Hypotension, unspecified hypotension type  I95.9 458.9   2. Stroke-like symptom  R29.90 781.99   3. Dysarthria  R47.1 784.51   4. Impaired mobility and ADLs  Z74.09 V49.89    Z78.9    5. Impaired functional mobility, balance, gait, and endurance  Z74.09 V49.89     Patient Active Problem List   Diagnosis   • 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, with long-term current use of insulin   • Closed nondisplaced fracture of fifth left metatarsal bone   • MAURICIO (generalized anxiety disorder)   • Depression, major, recurrent, moderate (HCC)   • GERD without esophagitis   • Long term prescription opiate use   • Mixed hyperlipidemia   • Vitamin D deficiency   • Seasonal allergic rhinitis   • Restrictive lung disease secondary to obesity   • Adult body mass index 37.0-37.9   • Snoring   • Class 3 severe obesity due to excess calories without serious comorbidity with body mass index (BMI) of 40.0 to 44.9 in adult ([facility])   • (HFpEF) heart failure with preserved ejection fraction ([facility])   • Type 2 diabetes mellitus with hyperglycemia, with long-term current use of insulin ([facility])   • Cyanocobalamin deficiency   • Weakness   • Coronary artery disease of native artery of native heart with stable angina pectoris ([facility])   • Hypertension   • Meniere's disease   • Gastroparesis   • Otitis media with effusion, left   • Pulmonary hypertension ([facility])   • Displaced fracture of fifth metatarsal bone, left foot, subsequent encounter for fracture with nonunion   • Pes cavus   • Primary osteoarthritis involving multiple joints   • Generalized anxiety disorder   • Chronic right-sided low back pain with right-sided sciatica   • Chest pain   • Thrombocytosis   • Leukocytosis   • Iron deficiency   • Adverse effect of iron   •  Hindfoot varus, acquired, left   • Chest pain due to myocardial ischemia   • Nonrheumatic tricuspid valve regurgitation   • Iron deficiency anemia due to chronic blood loss   • Malaise and fatigue   • Nonunion of subtalar arthrodesis   • Ankle arthritis   • Cellulitis of left lower extremity   • Urinary retention   • Acute bacterial endocarditis   • Staphylococcus aureus bacteremia   • Infection due to Acinetobacter species   • Endocarditis   • Left foot infection   • Uncontrolled hypertension   • Occlusion and stenosis of bilateral carotid arteries   • S/P BKA (below knee amputation) unilateral, left ([facility])   • Phantom pain after amputation of lower extremity ([facility])   • S/P CABG (coronary artery bypass graft)   • Acute colitis   • Severe malnutrition ([facility])   • Sepsis ([facility])   • TIA (transient ischemic attack)   • Chest pain, unspecified type   • Coronary artery abnormality   • Hypotension   • CHON (acute kidney injury) ([facility])   • Confusion     Past Medical History:   Diagnosis Date   • Angina, class IV ([facility])    • Anxiety    • Anxiety and depression    • Arthritis    • Benign paroxysmal positional vertigo    • Bladder disorder     has bladder stimulator   • Carpal tunnel syndrome    • CHF (congestive heart failure) ([facility])    • Chronic pain    • Coronary atherosclerosis     hx CABG 2005.  is followed by Dr Kwon   • Depression    • Diabetes mellitus ([facility])     Type 2, controlled   • Diabetic polyneuropathy ([facility])    • Disease of thyroid gland    • Elevated cholesterol    • Female stress incontinence    • Foot pain, left    • Full dentures    • Gastroparesis    • GERD (gastroesophageal reflux disease)    • Hyperlipidemia    • Hypertension    • Low back pain    • Malaise and fatigue    • Multiple joint pain    • Obesity     Refuses to be weighed   • Occlusion and stenosis of bilateral carotid arteries 6/18/2021   • Otalgia     Both   • Perforation of tympanic membrane     Left   • Postoperative wound infection    • Vitamin  D deficiency    • Wears glasses     reading     Past Surgical History:   Procedure Laterality Date   • ABDOMINAL SURGERY     • AMPUTATION FOOT     • ANGIOPLASTY      coronary   • ANKLE ARTHROSCOPY Left 2/26/2021    Procedure: Left foot hardware removal, ankle arthroscopy, bone grafting , left foot exostectomy;  Surgeon: Ignacio Lord DPM;  Location: [facility] OR;  Service: Podiatry;  Laterality: Left;   • BREAST BIOPSY Right    • CARDIAC CATHETERIZATION     • CARDIAC CATHETERIZATION N/A 6/20/2017    Procedure: Right Heart Cath;  Surgeon: Can Kwon MD PhD;  Location: [facility] CATH INVASIVE LOCATION;  Service:    • CARDIAC CATHETERIZATION N/A 2/18/2020    Procedure: Left Heart Cath;  Surgeon: Catalina Cooper MD;  Location: [facility] CATH INVASIVE LOCATION;  Service: Cardiology;  Laterality: N/A;   • CARDIAC CATHETERIZATION N/A 4/6/2022    Procedure: Left Heart Cath;  Surgeon: Sheryl Navas MD;  Location: [facility] CATH INVASIVE LOCATION;  Service: Cardiology;  Laterality: N/A;   • CARPAL TUNNEL RELEASE     • CHOLECYSTECTOMY     • COLONOSCOPY N/A 6/24/2020    Procedure: COLONOSCOPY;  Surgeon: Julián Maldonado MD;  Location: [facility] ENDOSCOPY;  Service: Gastroenterology;  Laterality: N/A;   • CORONARY ARTERY BYPASS GRAFT  2005   • ENDOSCOPY N/A 10/19/2018    Procedure: ESOPHAGOGASTRODUODENOSCOPY possible dilation;  Surgeon: Julián Maldonado MD;  Location: [facility] ENDOSCOPY;  Service: Gastroenterology   • ENDOSCOPY N/A 6/24/2020    Procedure: ESOPHAGOGASTRODUODENOSCOPY WED appt please;  Surgeon: Julián Maldonado MD;  Location: [facility] ENDOSCOPY;  Service: Gastroenterology;  Laterality: N/A;   • ENDOSCOPY AND COLONOSCOPY     • FOOT SURGERY      Toes   • FOOT SURGERY     • GASTRIC BANDING      Revision, laparoscopic   • HYSTERECTOMY     • INCISION AND DRAINAGE LEG Left 3/12/2021    Procedure: Left ankle arthroscopic irrigation and debridement, screw removal;  Surgeon: Ignacio Lord DPM;  Location: [facility]  OR;  Service: Podiatry;  Laterality: Left;   • MOUTH SURGERY     • SALPINGO OOPHORECTOMY     • SHOULDER SURGERY     • SUBTALAR ARTHRODESIS Left 1/16/2019    Procedure: LEFT FOOT HARDWARE REMOVAL, FIFTH METATARSAL , OPEN REDUCTION INTERNAL FIXATION, CALCANEAL OSTEOTOMY;  Surgeon: Ignacio Lord DPM;  Location: [facility] OR;  Service: Podiatry   • SUBTALAR ARTHRODESIS Left 10/16/2019    Procedure: foot hardware removal, subtalar joint fusion  possible de/reattachment of achilles tendon        (c-arm);  Surgeon: Ignacio Lord DPM;  Location: [facility] OR;  Service: Podiatry   • SUBTALAR ARTHRODESIS Left 9/30/2020    Procedure: subtalar, talonavicular joint arthrodesis.  Removal hardware.          (c-arm);  Surgeon: Ignacio Lord DPM;  Location: [facility] OR;  Service: Podiatry;  Laterality: Left;   • TRANSESOPHAGEAL ECHOCARDIOGRAM ([place]TE)      With color flow     PT Assessment (last 12 hours)     PT Evaluation and Treatment     Row Name 06/02/22 0920          Physical Therapy Time and Intention    Subjective Information complains of;fatigue;pain  -TW     Document Type therapy note (daily note)  -TW     Mode of Treatment physical therapy;individual therapy  -TW     Patient Effort adequate  -TW     Row Name 06/02/22 0920          General Information    Patient Profile Reviewed yes  -TW     Patient Observations alert;cooperative;agree to therapy  -TW     Patient/Family/Caregiver Comments/Observations none  -TW     General Observations of Patient Pt supine in bed.  -TW     Existing Precautions/Restrictions fall  -TW     Row Name 06/02/22 0920          Pain    Pretreatment Pain Rating 9/10  -TW     Posttreatment Pain Rating 9/10  -TW     Pain Location - head  -TW     Row Name 06/02/22 0920          Cognition    Orientation Status (Cognition) oriented x 4  -TW     Personal Safety Interventions fall prevention program maintained;gait belt;nonskid shoes/slippers when out of bed  -TW     Row Name 06/02/22 0920           Bed Mobility    Supine-Sit [place] (Bed Mobility) minimum assist (75% patient effort)  -TW     Sit-Supine [place] (Bed Mobility) not tested  -TW     Assistive Device (Bed Mobility) bed rails;head of bed elevated  -TW     Row Name 06/02/22 0920          Transfers    Sit-Stand [place] (Transfers) minimum assist (75% patient effort)  -     Row Name 06/02/22 0920          Sit-Stand Transfer    Assistive Device (Sit-Stand Transfers) walker, front-wheeled  -TW     Row Name 06/02/22 0920          Gait/Stairs (Locomotion)    [place] Level (Gait) not tested  -TW     Comment, (Gait/Stairs) Pt performed stand pivot to chair from bed.  -     Row Name 06/02/22 0920          Motor Skills    Therapeutic Exercise hip;knee;ankle  -     Row Name 06/02/22 0920          Hip (Therapeutic Exercise)    Hip (Therapeutic Exercise) AROM (active range of motion)  -TW     Hip AROM (Therapeutic Exercise) bilateral;sitting  -     Row Name 06/02/22 0920          Knee (Therapeutic Exercise)    Knee (Therapeutic Exercise) AROM (active range of motion)  -TW     Knee AROM (Therapeutic Exercise) bilateral;sitting  -TW     Row Name 06/02/22 0920          Ankle (Therapeutic Exercise)    Ankle (Therapeutic Exercise) AROM (active range of motion)  -TW     Ankle AROM (Therapeutic Exercise) bilateral;sitting  -TW     Row Name 06/02/22 0920          Plan of Care Review    Plan of Care Reviewed With patient  -TW     Progress improving  -TW     Outcome Evaluation Pt agreeable to therapy. Nsg okayed pt to get OOB. Pt able to perform sup to sit with min of 1 and stood with min of 1 using RW. Pt performed stand pivot to recliner with min of 1 and sat down in recliner. Pt performed B LE ther ex as listed while sitting in recliner and left up in recliner after tx. Pt would cont to benefit from therapy upon DC.  -     Row Name 06/02/22 0920          Vital Signs    Pre Systolic BP Rehab 151  -TW     Pre Treatment Diastolic BP 71  -TW      Post Systolic BP Rehab 145  -TW     Post Treatment Diastolic BP 63  -TW     Pretreatment Heart Rate (beats/min) 76  -TW     Posttreatment Heart Rate (beats/min) 87  -TW     Pre SpO2 (%) 94  -TW     O2 Delivery Pre Treatment room air  -TW     Post SpO2 (%) 99  -TW     Pre Patient Position Supine  -TW     Intra Patient Position Standing  -TW     Post Patient Position Sitting  -TW     Row Name 06/02/22 0920          Positioning and Restraints    Pre-Treatment Position in bed  -TW     Post Treatment Position chair  -TW     In Chair reclined;call light within reach;encouraged to call for assist  -TW     Row Name 06/02/22 0920          Therapy Assessment/Plan (PT)    Rehab Potential (PT) good, to achieve stated therapy goals  -TW     Row Name 06/02/22 0920          Bed Mobility Goal 1 (PT)    Activity/Assistive Device (Bed Mobility Goal 1, PT) sit to supine;supine to sit  -TW     [place] Level/Cues Needed (Bed Mobility Goal 1, PT) independent  -TW     Time Frame (Bed Mobility Goal 1, PT) by discharge  -TW     Progress/Outcomes (Bed Mobility Goal 1, PT) goal not met  -TW     Row Name 06/02/22 0920          Transfer Goal 1 (PT)    Activity/Assistive Device (Transfer Goal 1, PT) sit-to-stand/stand-to-sit;bed-to-chair/chair-to-bed  -TW     [place] Level/Cues Needed (Transfer Goal 1, PT) modified independence  -TW     Time Frame (Transfer Goal 1, PT) by discharge  -TW     Progress/Outcome (Transfer Goal 1, PT) goal not met  -TW     Row Name 06/02/22 0920          Gait Training Goal 1 (PT)    Activity/Assistive Device (Gait Training Goal 1, PT) gait (walking locomotion);assistive device use  -TW     [place] Level (Gait Training Goal 1, PT) modified independence  -TW     Distance (Gait Training Goal 1, PT) 150'x1  -TW     Time Frame (Gait Training Goal 1, PT) by discharge  -TW     Progress/Outcome (Gait Training Goal 1, PT) goal not met  -TW           User Key  (r) = Recorded By, (t) = Taken By, (c) = Cosigned By     Initials Name Provider Type    TW Thierno Esteves, PTA Physical Therapist Assistant                Physical Therapy Education                 Title: PT OT SLP Therapies (In Progress)     Topic: Physical Therapy (Done)     Point: Mobility training (Done)     Learning Progress Summary           Patient Acceptance, E,TB, VU by LR at 6/1/2022 1609    Comment: Educated on PT POC and goals.                   Point: Home exercise program (Done)     Learning Progress Summary           Patient Acceptance, E,TB, VU by LR at 6/1/2022 1609    Comment: Educated on PT POC and goals.                   Point: Body mechanics (Done)     Learning Progress Summary           Patient Acceptance, E,TB, VU by LR at 6/1/2022 1609    Comment: Educated on PT POC and goals.                   Point: Precautions (Done)     Learning Progress Summary           Patient Acceptance, E,TB, VU by LR at 6/1/2022 1609    Comment: Educated on PT POC and goals.                               User Key     Initials Effective Dates Name Provider Type Discipline    LR 06/16/21 -  Hernando Lopes Physical Therapist PT              PT Recommendation and Plan  Anticipated Discharge Disposition (PT): home with assist, home with home health, home with outpatient therapy services  Plan of Care Reviewed With: patient  Progress: improving  Outcome Evaluation: Pt agreeable to therapy. Nsg okayed pt to get OOB. Pt able to perform sup to sit with min of 1 and stood with min of 1 using RW. Pt performed stand pivot to recliner with min of 1 and sat down in recliner. Pt performed B LE ther ex as listed while sitting in recliner and left up in recliner after tx. Pt would cont to benefit from therapy upon DC.       Time Calculation:    PT Charges     Row Name 06/02/22 1312             Time Calculation    Start Time 0920  -TW      Stop Time 0945  -TW      Time Calculation (min) 25 min  -TW              Time Calculation- PT    Total Timed Code Minutes- PT 25 minute(s)   -TW            User Key  (r) = Recorded By, (t) = Taken By, (c) = Cosigned By    Initials Name Provider Type    TW Thierno Esteves, MANJINDER Physical Therapist Assistant              Therapy Charges for Today     Code Description Service Date Service Provider Modifiers Qty    04190758892 HC PT THERAPEUTIC ACT EA 15 MIN 6/2/2022 Thierno Esteves PTA GP 1    27222181086 HC PT THER PROC EA 15 MIN 6/2/2022 Thierno Esteves PTA GP 1          PT G-Codes  Outcome Measure Options: AM-PAC 6 Clicks Daily Activity (OT)  AM-PAC 6 Clicks Score (PT): 18  AM-PAC 6 Clicks Score (OT): 18    Thierno Esteves PTA  6/2/2022

## 2022-06-02 NOTE — PROGRESS NOTES
[facility] Medicine Services  INPATIENT PROGRESS NOTE    Length of Stay: 0  Date of Admission: 5/31/2022  Primary Care Physician: Marty, BEBE Luu    Subjective   Chief Complaint: No new complaints.    HPI: Patient is seen for follow-up.  62-year-old female with history of diabetes mellitus, depressive disorder, hypertension, coronary artery disease, chronic pain syndrome, GERD, chronic kidney disease, morbid obesity who was admitted for altered mental status, dysarthria, CHON and hypotension.    She is doing much better today.  Patient is able to independently feed herself, her mental status back to baseline and she voices no new complaints.  Blood pressure remains low but better.  Patient is eating better.  No chest pain or shortness of breath.  No headache or change in vision.  Still feels like little trouble with speech but no significant problem.  Chart reviewed.  Spoke with nursing staff.  Neurology help appreciated.    Review of Systems   Constitutional: Negative for appetite change, chills, diaphoresis and fever.   HENT: Negative for trouble swallowing and voice change.    Eyes: Negative for photophobia and visual disturbance.   Respiratory: Negative for cough, choking, chest tightness, shortness of breath, wheezing and stridor.    Cardiovascular: Negative for chest pain, palpitations and leg swelling.   Gastrointestinal: Negative for abdominal distention, abdominal pain, blood in stool, constipation, diarrhea, nausea and vomiting.   Endocrine: Negative for cold intolerance, heat intolerance, polydipsia, polyphagia and polyuria.   Genitourinary: Negative for decreased urine volume, difficulty urinating, dysuria, enuresis, flank pain, frequency, hematuria and urgency.   Musculoskeletal: Negative for arthralgias, gait problem, myalgias, neck pain and neck stiffness.   Skin: Negative for pallor, rash and wound.   Neurological: Negative for dizziness, tremors,  seizures, syncope, facial asymmetry, speech difficulty, weakness, light-headedness, numbness and headaches.   Hematological: Does not bruise/bleed easily.   Psychiatric/Behavioral: Negative for agitation, behavioral problems and confusion.       Objective    Temp:  [96.9 °F (36.1 °C)-97.4 °F (36.3 °C)] 97.3 °F (36.3 °C)  Heart Rate:  [68-74] 69  Resp:  [14-18] 16  BP: ()/(52-72) 128/59      Physical Exam  Vitals and nursing note reviewed.   Constitutional:       General: She is not in acute distress.     Appearance: She is well-developed. She is morbidly obese. She is not diaphoretic.      Comments: Awake, sitting in bed, calm and cooperative   HENT:      Head: Normocephalic and atraumatic.      Right Ear: External ear normal.      Left Ear: External ear normal.      Nose: Nose normal.      Mouth/Throat:      Mouth: Mucous membranes are moist.   Eyes:      General: No scleral icterus.     Extraocular Movements: Extraocular movements intact.      Conjunctiva/sclera: Conjunctivae normal.      Pupils: Pupils are equal, round, and reactive to light.   Neck:      Thyroid: No thyromegaly.      Vascular: No JVD.   Cardiovascular:      Rate and Rhythm: Normal rate and regular rhythm.      Heart sounds: Normal heart sounds. No murmur heard.    No friction rub. No gallop.   Pulmonary:      Effort: Pulmonary effort is normal. No respiratory distress.      Breath sounds: Normal breath sounds. No wheezing or rales.   Abdominal:      General: Bowel sounds are normal. There is no distension.      Palpations: Abdomen is soft.      Tenderness: There is no abdominal tenderness.   Musculoskeletal:         General: Deformity present. No swelling or tenderness. Normal range of motion.      Cervical back: Normal range of motion and neck supple.      Right lower leg: No edema.      Left lower leg: No edema.      Comments: Has left BKA, covered with Elastocrepe bandage.   Skin:     General: Skin is warm and dry.      Coloration: Skin  is not pale.      Findings: No bruising, erythema or rash.   Neurological:      General: No focal deficit present.      Mental Status: She is alert and oriented to person, place, and time.      Cranial Nerves: No cranial nerve deficit.      Sensory: No sensory deficit.      Motor: No weakness.      Coordination: Coordination normal.      Deep Tendon Reflexes: Reflexes are normal and symmetric. Reflexes normal.   Psychiatric:         Mood and Affect: Mood normal.         Behavior: Behavior normal. Behavior is cooperative.         Thought Content: Thought content normal.         Judgment: Judgment normal.           Medication Review:    Current Facility-Administered Medications:   •  acetaminophen (TYLENOL) tablet 650 mg, 650 mg, Oral, Q4H PRN, 650 mg at 05/31/22 1957 **OR** acetaminophen (TYLENOL) suppository 650 mg, 650 mg, Rectal, Q4H PRN, Fernando Noriega MD  •  aluminum-magnesium hydroxide-simethicone (MAALOX MAX) 400-400-40 MG/5ML suspension 7.5 mL, 7.5 mL, Oral, Q4H PRN, Fernando Noriega MD, 7.5 mL at 05/31/22 2029  •  aspirin tablet 325 mg, 325 mg, Oral, Daily, 325 mg at 06/02/22 0832 **OR** aspirin suppository 300 mg, 300 mg, Rectal, Daily, Fernando Noriega MD  •  atorvastatin (LIPITOR) tablet 80 mg, 80 mg, Oral, Nightly, Fernando Noriega MD, 80 mg at 06/01/22 2008  •  bisacodyl (DULCOLAX) suppository 10 mg, 10 mg, Rectal, Daily PRN, Fernando Noriega MD  •  cholecalciferol (VITAMIN D3) tablet 5,000 Units, 5,000 Units, Oral, Daily, Neeraj Dna MD, 5,000 Units at 06/02/22 0832  •  clopidogrel (PLAVIX) tablet 75 mg, 75 mg, Oral, Daily, Raymon Hoffmann MD  •  cyanocobalamin injection 1,000 mcg, 1,000 mcg, Intramuscular, Q28 Days, Neeraj Dan MD, 1,000 mcg at 05/31/22 2348  •  dextrose (D50W) (25 g/50 mL) IV injection 25 g, 25 g, Intravenous, Q15 Min PRN, Mat Peng MD  •  dextrose (GLUTOSE) oral gel 15 g, 15 g, Oral, Q15 Min PRN, Mat Peng MD  •   Enoxaparin Sodium (LOVENOX) syringe 30 mg, 30 mg, Subcutaneous, Q24H, Neeraj Dan MD, 30 mg at 06/01/22 2203  •  famotidine (PEPCID) tablet 20 mg, 20 mg, Oral, BID PRN, Neeraj Dan MD  •  folic acid (FOLVITE) tablet 1,000 mcg, 1,000 mcg, Oral, Daily, Neeraj Dan MD, 1,000 mcg at 06/01/22 0833  •  gabapentin (NEURONTIN) capsule 400 mg, 400 mg, Oral, Q12H, Neeraj Dan MD, 400 mg at 06/02/22 0832  •  glucagon (human recombinant) (GLUCAGEN DIAGNOSTIC) injection 1 mg, 1 mg, Intramuscular, Q15 Min PRN, Mat Peng MD  •  HYDROcodone-acetaminophen (NORCO) 7.5-325 MG per tablet 1 tablet, 1 tablet, Oral, Q6H PRN, Neeraj Dan MD, 1 tablet at 06/01/22 2008  •  Insulin Aspart (novoLOG) injection 0-14 Units, 0-14 Units, Subcutaneous, 4x Daily AC & at Bedtime, Marilee Key MD, 12 Units at 06/02/22 0650  •  Insulin Aspart (novoLOG) injection 8 Units, 8 Units, Subcutaneous, TID With Meals, Raymon Hoffmann MD  •  insulin detemir (LEVEMIR) injection 24 Units, 24 Units, Subcutaneous, Nightly, Neeraj Dan MD, 24 Units at 06/01/22 2052  •  insulin regular (humuLIN R,novoLIN R) injection 5 Units, 5 Units, Intravenous, Once, Marilee Key MD  •  isosorbide mononitrate (IMDUR) 24 hr tablet 30 mg, 30 mg, Oral, Q24H, Neeraj Dan MD, 30 mg at 06/02/22 0832  •  LORazepam (ATIVAN) tablet 0.5 mg, 0.5 mg, Oral, Q8H, Neeraj Dan MD, 0.5 mg at 06/02/22 0646  •  meclizine (ANTIVERT) tablet 25 mg, 25 mg, Oral, TID PRN, Neeraj Dan MD  •  methocarbamol (ROBAXIN) tablet 500 mg, 500 mg, Oral, BID PRN, Neeraj Dan MD  •  montelukast (SINGULAIR) tablet 10 mg, 10 mg, Oral, Nightly, Neeraj Dan MD, 10 mg at 06/01/22 2008  •  nitroglycerin (NITROSTAT) SL tablet 0.4 mg, 0.4 mg, Sublingual, Q5 Min PRN, Neeraj Dan MD  •  ondansetron (ZOFRAN) injection 4 mg, 4 mg, Intravenous, Q6H PRN, Fernando Noriega MD, 4  mg at 05/31/22 1918  •  ondansetron (ZOFRAN) tablet 8 mg, 8 mg, Oral, Q8H PRN, Neeraj Dan MD  •  pantoprazole (PROTONIX) EC tablet 40 mg, 40 mg, Oral, Daily, Neeraj Dan MD, 40 mg at 06/02/22 0832  •  ranolazine (RANEXA) 12 hr tablet 500 mg, 500 mg, Oral, Q12H, Neeraj Dan MD, 500 mg at 06/02/22 0833  •  sodium chloride 0.9 % flush 10 mL, 10 mL, Intravenous, Q12H, Fernando Noriega MD, 10 mL at 06/02/22 0834  •  sodium chloride 0.9 % flush 10 mL, 10 mL, Intravenous, PRN, Fernando Noriega MD  •  sodium chloride 0.9 % flush 10 mL, 10 mL, Intravenous, Q12H, Neeraj Dan MD, 10 mL at 06/02/22 0834  •  sodium chloride 0.9 % flush 10 mL, 10 mL, Intravenous, PRN, Neeraj Dan MD, 10 mL at 06/01/22 0302  •  sodium chloride 0.9 % infusion, 50 mL/hr, Intravenous, Continuous, HoffmannRaymon manzanares MD, Last Rate: 100 mL/hr at 06/01/22 1850, 100 mL/hr at 06/01/22 1850  •  sodium chloride 0.9% - IBW for BMI > 30 bolus 1,917 mL, 30 mL/kg (Ideal), Intravenous, Once, Jordy Godfrey MD, Held at 05/31/22 1912    Results Review:  I have reviewed the labs, radiology results, and diagnostic studies.    Laboratory Data:   Results from last 7 days   Lab Units 06/02/22  0755 06/02/22  0351 06/01/22  0631 05/31/22  1849 05/31/22  0922   SODIUM mmol/L 136 133* 131* 130* 132*   POTASSIUM mmol/L 5.4* 5.7* 5.1 5.1 5.2   CHLORIDE mmol/L 103 103 98 95* 96*   CO2 mmol/L 22.0 20.0* 18.0* 23.0 20.0*   BUN mg/dL 31* 33* 35* 34* 27*   CREATININE mg/dL 1.82* 1.98* 2.80* 2.86* 2.05*   GLUCOSE mg/dL 313* 365* 251* 432* 203*   CALCIUM mg/dL 10.2 9.8 8.1* 8.7 9.0   BILIRUBIN mg/dL  --   --  0.2 0.2 0.3   ALK PHOS U/L  --   --  118* 130* 141*   ALT (SGPT) U/L  --   --  12 13 16   AST (SGOT) U/L  --   --  13 11 16   ANION GAP mmol/L 11.0 10.0 15.0 12.0 16.0*     Estimated Creatinine Clearance: 44.4 mL/min (A) (by C-G formula based on SCr of 1.82 mg/dL (H)).  Results from last 7 days   Lab  Units 05/31/22  1849   MAGNESIUM mg/dL 1.8         Results from last 7 days   Lab Units 06/02/22  0351 06/01/22  0631 05/31/22  1849 05/31/22  0924   WBC 10*3/mm3 9.74 14.22* 10.84* 13.95*   HEMOGLOBIN g/dL 11.0* 11.4* 11.5* 12.8   HEMATOCRIT % 34.3 34.5 34.7 38.3   PLATELETS 10*3/mm3 335 298 381 356     Results from last 7 days   Lab Units 05/31/22  1849   INR  0.92       Culture Data:   Blood Culture   Date Value Ref Range Status   05/31/2022 No growth at 24 hours  Preliminary   05/31/2022 No growth at 24 hours  Preliminary     No results found for: URINECX  No results found for: RESPCX  No results found for: WOUNDCX  No results found for: STOOLCX  No components found for: BODYFLD    Radiology Data:   Imaging Results (Last 24 Hours)     Procedure Component Value Units Date/Time    CT Head Without Contrast [[id number]] Collected: 06/01/22 1032     Updated: 06/01/22 1102    Narrative:      COMPARISON:     5/31/2022    INDICATION: Stroke follow-up    TECHNIQUE: CT of the head was obtained from the vertex through  the skull base.  Reformats are obtained.    All CT scans at this location are performed using dose modulation  techniques as appropriate to a performed exam including the  following: automated exposure control; adjustment of the mA  and/or kV according to patient size (this includes techniques or  standardized protocols for targeted exams where dose is matched  to indication / reason for exam; i.e. extremities or head); use  of iterative reconstruction technique.    FINDINGS: There is prominence of ventricles and sulci consistent  with atrophy. There is hypoattenuation of the periventricular  white matter, consistent with small vessel disease. There is no  mass, mass effect, or midline shift.  There is no abnormal  intra-axial or extra-axial fluid collection or hematoma.  There  is preservation of normal gray-white differentiation.   Atherosclerotic calcification of intracranial arteries. The bony  calvarium  is intact.  Hyperostosis frontalis. The visualized  paranasal sinuses and mastoid air cells appear well-aerated.      Impression:      1. No acute intracranial abnormality.    2. Atrophy.  3. Small vessel ischemic disease.    Electronically signed by:  Pranav Chandler MD  6/1/2022 11:00 AM CDT  Workstation: [id number]          ABG:        I have reviewed the patient's current medications.     Assessment/Plan     Hospital Problem List:  Active Problems:    Weakness    Hypotension    CHON (acute kidney injury) (HCC)    Confusion    Acute metabolic encephalopathy with dysarthria (likely due to a combination of polypharmacy and acute kidney injury): Resolved as mental status is back to baseline and speech is back to normal. Non Contrast CT scan of the head did not show any acute changes and MRI could not be done due to patient's bladder stimulator.  Continue aspirin and high intensity statin.  Repeat noncontrast CT scan of the head is unremarkable with no acute findings.  Input by neurologist is appreciated.  -Neurologist has signed off    Acute on chronic kidney disease (likely prerenal): Patient's baseline creatinine is between 1.2 to 1.6.  Continue IV hydration, avoid nephrotoxins, monitor renal function and consult nephrologist if the need arises.  Urinalysis was unremarkable.  - Renal functions are much better, IV fluid decreased to 50 mL/h, probably may discontinue tomorrow.  Days continue to hold diuretics and some other antihypertensive, may resume when appropriate.    Hypotension (likely medication induced/secondary to dehydration): Continue IV resuscitation, given a dose of albumin and monitor hemodynamics.  - Much better now  - Holding diuretics and some antihypertensive as above.  May resume when appropriate.    Diabetes mellitus (with hyperglycemia): Blood sugar control has improved.  Continue basal insulin with Accu-Cheks and sliding scale insulin.  Pseudohyponatremia is reactive and will be monitored.  - Started  on mealtime insulin    Coronary artery disease: Continue statin, Ranexa, nitrates and antiplatelet therapy.  Lopressor is to remain on hold secondary to hypotension.      Deconditioning: Continue PT and OT.    Continue PPI for GERD/DVT prophylaxis with Lovenox.    I confirmed that the patient's Advance Care Plan is present, code status is documented, or surrogate decision maker is listed in the patient's medical record.     I have utilized all available immediate resources to obtain, update, or review the patient's current medications    Approximately 32 minutes of critical care time were spent managing the patient exclusive of billable procedures.     Discharge Planning: In progress.  Home possibly tomorrow  Transfer to floor.  Raymon Hoffmann MD   06/02/22   09:36 CDT

## 2022-06-02 NOTE — PLAN OF CARE
Goal Outcome Evaluation:  Plan of Care Reviewed With: patient        Progress: improving  Outcome Evaluation: Pt vitals stable overnight. Pt NIH remained a 0. Pt encephalopathy improving. FSBG 365 on bmp. covered this am with ssi. Pt states she takes 60 units at bedtime of long acting insulin. Will pass this along in report. uop adequate. will continue to monitor.

## 2022-06-03 ENCOUNTER — HOSPITAL ENCOUNTER (OUTPATIENT)
Dept: ULTRASOUND IMAGING | Facility: HOSPITAL | Age: 62
End: 2022-06-03

## 2022-06-03 ENCOUNTER — READMISSION MANAGEMENT (OUTPATIENT)
Dept: CALL CENTER | Facility: HOSPITAL | Age: 62
End: 2022-06-03

## 2022-06-03 ENCOUNTER — HOME HEALTH ADMISSION (OUTPATIENT)
Dept: HOME HEALTH SERVICES | Facility: HOME HEALTHCARE | Age: 62
End: 2022-06-03

## 2022-06-03 VITALS
HEIGHT: 68 IN | SYSTOLIC BLOOD PRESSURE: 141 MMHG | RESPIRATION RATE: 18 BRPM | WEIGHT: 273.2 LBS | HEART RATE: 79 BPM | OXYGEN SATURATION: 96 % | BODY MASS INDEX: 41.4 KG/M2 | DIASTOLIC BLOOD PRESSURE: 65 MMHG | TEMPERATURE: 96.6 F

## 2022-06-03 PROBLEM — N17.9 AKI (ACUTE KIDNEY INJURY): Status: RESOLVED | Noted: 2022-05-31 | Resolved: 2022-06-03

## 2022-06-03 PROBLEM — R53.1 WEAKNESS: Status: RESOLVED | Noted: 2017-11-27 | Resolved: 2022-06-03

## 2022-06-03 PROBLEM — I95.9 HYPOTENSION: Status: RESOLVED | Noted: 2022-05-31 | Resolved: 2022-06-03

## 2022-06-03 LAB
ANION GAP SERPL CALCULATED.3IONS-SCNC: 9 MMOL/L (ref 5–15)
BASOPHILS # BLD AUTO: 0.05 10*3/MM3 (ref 0–0.2)
BASOPHILS NFR BLD AUTO: 0.6 % (ref 0–1.5)
BUN SERPL-MCNC: 23 MG/DL (ref 8–23)
BUN/CREAT SERPL: 17 (ref 7–25)
CALCIUM SPEC-SCNC: 9.5 MG/DL (ref 8.6–10.5)
CHLORIDE SERPL-SCNC: 102 MMOL/L (ref 98–107)
CO2 SERPL-SCNC: 23 MMOL/L (ref 22–29)
CREAT SERPL-MCNC: 1.35 MG/DL (ref 0.57–1)
DEPRECATED RDW RBC AUTO: 44.2 FL (ref 37–54)
EGFRCR SERPLBLD CKD-EPI 2021: 45.1 ML/MIN/1.73
EOSINOPHIL # BLD AUTO: 0.32 10*3/MM3 (ref 0–0.4)
EOSINOPHIL NFR BLD AUTO: 3.7 % (ref 0.3–6.2)
ERYTHROCYTE [DISTWIDTH] IN BLOOD BY AUTOMATED COUNT: 13.4 % (ref 12.3–15.4)
GLUCOSE BLDC GLUCOMTR-MCNC: 241 MG/DL (ref 70–130)
GLUCOSE BLDC GLUCOMTR-MCNC: 291 MG/DL (ref 70–130)
GLUCOSE SERPL-MCNC: 255 MG/DL (ref 65–99)
HCT VFR BLD AUTO: 32 % (ref 34–46.6)
HGB BLD-MCNC: 10.8 G/DL (ref 12–15.9)
IMM GRANULOCYTES # BLD AUTO: 0.03 10*3/MM3 (ref 0–0.05)
IMM GRANULOCYTES NFR BLD AUTO: 0.3 % (ref 0–0.5)
LYMPHOCYTES # BLD AUTO: 2.4 10*3/MM3 (ref 0.7–3.1)
LYMPHOCYTES NFR BLD AUTO: 27.7 % (ref 19.6–45.3)
MCH RBC QN AUTO: 30.5 PG (ref 26.6–33)
MCHC RBC AUTO-ENTMCNC: 33.8 G/DL (ref 31.5–35.7)
MCV RBC AUTO: 90.4 FL (ref 79–97)
MONOCYTES # BLD AUTO: 0.55 10*3/MM3 (ref 0.1–0.9)
MONOCYTES NFR BLD AUTO: 6.3 % (ref 5–12)
NEUTROPHILS NFR BLD AUTO: 5.32 10*3/MM3 (ref 1.7–7)
NEUTROPHILS NFR BLD AUTO: 61.4 % (ref 42.7–76)
NRBC BLD AUTO-RTO: 0 /100 WBC (ref 0–0.2)
PLATELET # BLD AUTO: 354 10*3/MM3 (ref 140–450)
PMV BLD AUTO: 9.6 FL (ref 6–12)
POTASSIUM SERPL-SCNC: 4.8 MMOL/L (ref 3.5–5.2)
RBC # BLD AUTO: 3.54 10*6/MM3 (ref 3.77–5.28)
SODIUM SERPL-SCNC: 134 MMOL/L (ref 136–145)
WBC NRBC COR # BLD: 8.67 10*3/MM3 (ref 3.4–10.8)

## 2022-06-03 PROCEDURE — 82962 GLUCOSE BLOOD TEST: CPT

## 2022-06-03 PROCEDURE — 80048 BASIC METABOLIC PNL TOTAL CA: CPT | Performed by: INTERNAL MEDICINE

## 2022-06-03 PROCEDURE — 85025 COMPLETE CBC W/AUTO DIFF WBC: CPT | Performed by: INTERNAL MEDICINE

## 2022-06-03 PROCEDURE — 63710000001 INSULIN ASPART PER 5 UNITS: Performed by: INTERNAL MEDICINE

## 2022-06-03 PROCEDURE — G0378 HOSPITAL OBSERVATION PER HR: HCPCS

## 2022-06-03 RX ORDER — FUROSEMIDE 40 MG/1
20 TABLET ORAL DAILY
Qty: 90 TABLET | Refills: 1 | Status: SHIPPED | OUTPATIENT
Start: 2022-06-03 | End: 2022-06-04 | Stop reason: SDUPTHER

## 2022-06-03 RX ADMIN — ASPIRIN 325 MG: 325 TABLET ORAL at 08:34

## 2022-06-03 RX ADMIN — INSULIN ASPART 8 UNITS: 100 INJECTION, SOLUTION INTRAVENOUS; SUBCUTANEOUS at 12:06

## 2022-06-03 RX ADMIN — PANTOPRAZOLE SODIUM 40 MG: 40 TABLET, DELAYED RELEASE ORAL at 08:35

## 2022-06-03 RX ADMIN — ISOSORBIDE MONONITRATE 30 MG: 30 TABLET, EXTENDED RELEASE ORAL at 08:33

## 2022-06-03 RX ADMIN — SODIUM CHLORIDE 50 ML/HR: 9 INJECTION, SOLUTION INTRAVENOUS at 05:04

## 2022-06-03 RX ADMIN — RANOLAZINE 500 MG: 500 TABLET, FILM COATED, EXTENDED RELEASE ORAL at 08:34

## 2022-06-03 RX ADMIN — LORAZEPAM 0.5 MG: 0.5 TABLET ORAL at 05:01

## 2022-06-03 RX ADMIN — HYDROCODONE BITARTRATE AND ACETAMINOPHEN 1 TABLET: 7.5; 325 TABLET ORAL at 05:01

## 2022-06-03 RX ADMIN — GABAPENTIN 400 MG: 400 CAPSULE ORAL at 08:35

## 2022-06-03 RX ADMIN — FOLIC ACID 1000 MCG: 1 TABLET ORAL at 08:35

## 2022-06-03 RX ADMIN — METHOCARBAMOL 500 MG: 500 TABLET, FILM COATED ORAL at 01:35

## 2022-06-03 RX ADMIN — CLOPIDOGREL BISULFATE 75 MG: 75 TABLET ORAL at 08:35

## 2022-06-03 RX ADMIN — Medication 10 ML: at 08:36

## 2022-06-03 RX ADMIN — INSULIN ASPART 5 UNITS: 100 INJECTION, SOLUTION INTRAVENOUS; SUBCUTANEOUS at 08:03

## 2022-06-03 RX ADMIN — Medication 5000 UNITS: at 08:33

## 2022-06-03 RX ADMIN — INSULIN ASPART 8 UNITS: 100 INJECTION, SOLUTION INTRAVENOUS; SUBCUTANEOUS at 08:04

## 2022-06-03 RX ADMIN — INSULIN ASPART 8 UNITS: 100 INJECTION, SOLUTION INTRAVENOUS; SUBCUTANEOUS at 12:05

## 2022-06-03 NOTE — DISCHARGE PLACEMENT REQUEST
"Ariana Bailey (62 y.o. Female)             Date of Birth   1960    Social Security Number       Address   [address] [place] [id number]    Home Phone   [phone]    MRN   [id number]       Hinduism   Rastafarian    Marital Status                               Admission Date   5/31/22    Admission Type   Emergency    Admitting Provider   Darren Suggs MD    Attending Provider   Darren Suggs MD    Department, Room/Bed   [address], 373/1       Discharge Date       Discharge Disposition   Home or Self Care    Discharge Destination                               Attending Provider: Darren Suggs MD    Allergies: Seroquel [Quetiapine], Avandia [Rosiglitazone], Morphine And Related, Oxycodone    Isolation: None   Infection: None   Code Status: CPR   Advance Care Planning Activity    Ht: 172.7 cm (67.99\")   Wt: 124 kg (273 lb 3.2 oz)    Admission Cmt: None   Principal Problem: None                Active Insurance as of 5/31/2022     Primary Coverage     Payor Plan Insurance Group Employer/Plan Group    [employer] [facility] [employer] EMPLOYEE [id number]     Payor Plan Address Payor Plan Phone Number Payor Plan Fax Number Effective Dates    [address] [phone]  1/1/2022 - None Entered    [place] [address]       Subscriber Name Subscriber Birth Date Member ID       ARIANA BAILEY 1960 [id number]                 Emergency Contacts      (Rel.) Home Phone Work Phone Mobile Phone    Nadeem Bailey (Spouse) [phone] -- [phone]    Елена David (Sister) [phone] -- [phone]            Insurance Information                [employer] Phone: [phone]    Subscriber: Ariana Bailey Subscriber#: [id number]    Group#: [id number] Precert#: --          "

## 2022-06-03 NOTE — DISCHARGE SUMMARY
[facility] Medicine Services  DISCHARGE SUMMARY       Date of Admission: 5/31/2022  Date of Discharge:  6/3/2022  Primary Care Physician: Kimberly Ferguson APRN    Presenting Problem/History of Present Illness:  Dysarthria [R47.1]  Stroke-like symptom [R29.90]  Hypotension, unspecified hypotension type [I95.9]     Final Discharge Diagnoses:  Active Hospital Problems    Diagnosis    • Confusion    CHON on CKD  hypotension    Consults:   Consults     Date and Time Order Name Status Description    5/31/2022  7:03 PM Inpatient Neurology Consult Stroke Completed           Procedures Performed: none                Pertinent Test Results:   Lab Results (most recent)     Procedure Component Value Units Date/Time    POC Glucose Once [[id number]]  (Abnormal) Collected: 06/03/22 1010    Specimen: Blood Updated: 06/03/22 1050     Glucose 291 mg/dL      Comment: RN NotifiedOperator: [id number] LOUIS LAIRDISSAMeter ID: [id number]       POC Glucose Once [[id number]]  (Abnormal) Collected: 06/03/22 0609    Specimen: Blood Updated: 06/03/22 0634     Glucose 241 mg/dL      Comment: RN NotifiedOperator: [id number] CIERA ACELAMeter ID: [id number]       Basic Metabolic Panel [[id number]]  (Abnormal) Collected: 06/03/22 0526    Specimen: Blood Updated: 06/03/22 0558     Glucose 255 mg/dL      BUN 23 mg/dL      Creatinine 1.35 mg/dL      Sodium 134 mmol/L      Potassium 4.8 mmol/L      Chloride 102 mmol/L      CO2 23.0 mmol/L      Calcium 9.5 mg/dL      BUN/Creatinine Ratio 17.0     Anion Gap 9.0 mmol/L      eGFR 45.1 mL/min/1.73      Comment: National Kidney Foundation and American Society of Nephrology (ASN) Task Force recommended calculation based on the Chronic Kidney Disease Epidemiology Collaboration (CKD-EPI) equation refit without adjustment for race.       Narrative:      GFR Normal >60  Chronic Kidney Disease <60  Kidney Failure <15      CBC & Differential [[id number]]   (Abnormal) Collected: 06/03/22 0526    Specimen: Blood Updated: 06/03/22 0538    Narrative:      The following orders were created for panel order CBC & Differential.  Procedure                               Abnormality         Status                     ---------                               -----------         ------                     CBC Auto Differential[[id number]]        Abnormal            Final result                 Please view results for these tests on the individual orders.    CBC Auto Differential [[id number]]  (Abnormal) Collected: 06/03/22 0526    Specimen: Blood Updated: 06/03/22 0538     WBC 8.67 10*3/mm3      RBC 3.54 10*6/mm3      Hemoglobin 10.8 g/dL      Hematocrit 32.0 %      MCV 90.4 fL      MCH 30.5 pg      MCHC 33.8 g/dL      RDW 13.4 %      RDW-SD 44.2 fl      MPV 9.6 fL      Platelets 354 10*3/mm3      Neutrophil % 61.4 %      Lymphocyte % 27.7 %      Monocyte % 6.3 %      Eosinophil % 3.7 %      Basophil % 0.6 %      Immature Grans % 0.3 %      Neutrophils, Absolute 5.32 10*3/mm3      Lymphocytes, Absolute 2.40 10*3/mm3      Monocytes, Absolute 0.55 10*3/mm3      Eosinophils, Absolute 0.32 10*3/mm3      Basophils, Absolute 0.05 10*3/mm3      Immature Grans, Absolute 0.03 10*3/mm3      nRBC 0.0 /100 WBC     Blood Culture - Blood, Arm, Left [[id number]]  (Normal) Collected: 05/31/22 2317    Specimen: Blood from Arm, Left Updated: 06/02/22 2330     Blood Culture No growth at 2 days    Blood Culture - Blood, Hand, Left [[id number]]  (Normal) Collected: 05/31/22 2317    Specimen: Blood from Hand, Left Updated: 06/02/22 2330     Blood Culture No growth at 2 days    Extra Tubes [[id number]] Collected: 06/02/22 0756    Specimen: Blood, Venous Line Updated: 06/02/22 0903    Narrative:      The following orders were created for panel order Extra Tubes.  Procedure                               Abnormality         Status                     ---------                               -----------          ------                     Lavender Top[[id number]]                                     Final result                 Please view results for these tests on the individual orders.    Lavender Top [[id number]] Collected: 06/02/22 0756    Specimen: Blood Updated: 06/02/22 0903     Extra Tube hold for add-on     Comment: Auto resulted       Basic Metabolic Panel [[id number]]  (Abnormal) Collected: 06/02/22 0755    Specimen: Blood Updated: 06/02/22 0829     Glucose 313 mg/dL      BUN 31 mg/dL      Creatinine 1.82 mg/dL      Sodium 136 mmol/L      Potassium 5.4 mmol/L      Comment: Slight hemolysis detected by analyzer. Results may be affected.        Chloride 103 mmol/L      CO2 22.0 mmol/L      Calcium 10.2 mg/dL      BUN/Creatinine Ratio 17.0     Anion Gap 11.0 mmol/L      eGFR 31.5 mL/min/1.73      Comment: National Kidney Foundation and American Society of Nephrology (ASN) Task Force recommended calculation based on the Chronic Kidney Disease Epidemiology Collaboration (CKD-EPI) equation refit without adjustment for race.       Narrative:      GFR Normal >60  Chronic Kidney Disease <60  Kidney Failure <15      CBC & Differential [[id number]]  (Abnormal) Collected: 06/02/22 0351    Specimen: Blood Updated: 06/02/22 0355    Narrative:      The following orders were created for panel order CBC & Differential.  Procedure                               Abnormality         Status                     ---------                               -----------         ------                     CBC Auto Differential[[id number]]        Abnormal            Final result                 Please view results for these tests on the individual orders.    CBC Auto Differential [[id number]]  (Abnormal) Collected: 06/02/22 0351    Specimen: Blood Updated: 06/02/22 0355     WBC 9.74 10*3/mm3      RBC 3.70 10*6/mm3      Hemoglobin 11.0 g/dL      Hematocrit 34.3 %      MCV 92.7 fL      MCH 29.7 pg      MCHC 32.1 g/dL      RDW 13.7 %      RDW-SD 46.0 fl       MPV 9.7 fL      Platelets 335 10*3/mm3      Neutrophil % 66.9 %      Lymphocyte % 20.6 %      Monocyte % 8.5 %      Eosinophil % 3.1 %      Basophil % 0.4 %      Immature Grans % 0.5 %      Neutrophils, Absolute 6.51 10*3/mm3      Lymphocytes, Absolute 2.01 10*3/mm3      Monocytes, Absolute 0.83 10*3/mm3      Eosinophils, Absolute 0.30 10*3/mm3      Basophils, Absolute 0.04 10*3/mm3      Immature Grans, Absolute 0.05 10*3/mm3      nRBC 0.0 /100 WBC     Folate [[id number]]  (Normal) Collected: 06/01/22 0631    Specimen: Blood Updated: 06/01/22 1353     Folate >20.00 ng/mL     Narrative:      Results may be falsely increased if patient taking Biotin.      Vitamin B12 [[id number]]  (Abnormal) Collected: 06/01/22 0631    Specimen: Blood Updated: 06/01/22 1353     Vitamin B-12 >2,000 pg/mL     Narrative:      Results may be falsely increased if patient taking Biotin.      Comprehensive Metabolic Panel [[id number]]  (Abnormal) Collected: 06/01/22 0631    Specimen: Blood Updated: 06/01/22 1257     Glucose 251 mg/dL      BUN 35 mg/dL      Creatinine 2.80 mg/dL      Sodium 131 mmol/L      Potassium 5.1 mmol/L      Chloride 98 mmol/L      CO2 18.0 mmol/L      Calcium 8.1 mg/dL      Total Protein 5.9 g/dL      Albumin 3.20 g/dL      ALT (SGPT) 12 U/L      AST (SGOT) 13 U/L      Alkaline Phosphatase 118 U/L      Total Bilirubin 0.2 mg/dL      Globulin 2.7 gm/dL      A/G Ratio 1.2 g/dL      BUN/Creatinine Ratio 12.5     Anion Gap 15.0 mmol/L      eGFR 18.8 mL/min/1.73      Comment: National Kidney Foundation and American Society of Nephrology (ASN) Task Force recommended calculation based on the Chronic Kidney Disease Epidemiology Collaboration (CKD-EPI) equation refit without adjustment for race.       Narrative:      GFR Normal >60  Chronic Kidney Disease <60  Kidney Failure <15      Sedimentation Rate [[id number]]  (Abnormal) Collected: 06/01/22 0631    Specimen: Blood Updated: 06/01/22 0807     Sed Rate 32 mm/hr     TSH  [[id number]]  (Abnormal) Collected: 06/01/22 0631    Specimen: Blood Updated: 06/01/22 0729     TSH 4.240 uIU/mL     Lipid Panel [[id number]]  (Abnormal) Collected: 06/01/22 0631    Specimen: Blood Updated: 06/01/22 0724     Total Cholesterol 154 mg/dL      Triglycerides 288 mg/dL      HDL Cholesterol 35 mg/dL      LDL Cholesterol  72 mg/dL      VLDL Cholesterol 47 mg/dL      LDL/HDL Ratio 1.75    Narrative:      Cholesterol Reference Ranges  (U.S. Department of Health and Human Services ATP III Classifications)    Desirable          <200 mg/dL  Borderline High    200-239 mg/dL  High Risk          >240 mg/dL      Triglyceride Reference Ranges  (U.S. Department of Health and Human Services ATP III Classifications)    Normal           <150 mg/dL  Borderline High  150-199 mg/dL  High             200-499 mg/dL  Very High        >500 mg/dL    HDL Reference Ranges  (U.S. Department of Health and Human Services ATP III Classifications)    Low     <40 mg/dl (major risk factor for CHD)  High    >60 mg/dl ('negative' risk factor for CHD)        LDL Reference Ranges  (U.S. Department of Health and Human Services ATP III Classifications)    Optimal          <100 mg/dL  Near Optimal     100-129 mg/dL  Borderline High  130-159 mg/dL  High             160-189 mg/dL  Very High        >189 mg/dL    Hemoglobin A1c [[id number]]  (Abnormal) Collected: 06/01/22 0631    Specimen: Blood Updated: 06/01/22 0717     Hemoglobin A1C 11.10 %     Narrative:      Hemoglobin A1C Ranges:    Increased Risk for Diabetes  5.7% to 6.4%  Diabetes                     >= 6.5%  Diabetic Goal                < 7.0%    CBC (No Diff) [[id number]]  (Abnormal) Collected: 06/01/22 0631    Specimen: Blood Updated: 06/01/22 0704     WBC 14.22 10*3/mm3      RBC 3.80 10*6/mm3      Hemoglobin 11.4 g/dL      Hematocrit 34.5 %      MCV 90.8 fL      MCH 30.0 pg      MCHC 33.0 g/dL      RDW 13.6 %      RDW-SD 44.4 fl      MPV 10.7 fL      Platelets 298 10*3/mm3     Urine Drug  Screen - Urine, Catheter In/Out [[id number]]  (Abnormal) Collected: 06/01/22 0318    Specimen: Urine, Catheter In/Out Updated: 06/01/22 0411     THC, Screen, Urine Negative     Phencyclidine (PCP), Urine Negative     Cocaine Screen, Urine Negative     Methamphetamine, Ur Negative     Opiate Screen Positive     Amphetamine Screen, Urine Negative     Benzodiazepine Screen, Urine Positive     Tricyclic Antidepressants Screen Negative     Methadone Screen, Urine Negative     Barbiturates Screen, Urine Negative     Oxycodone Screen, Urine Negative     Propoxyphene Screen Negative     Buprenorphine, Screen, Urine Negative    Narrative:      Cutoff For Drugs Screened:    Amphetamines               500 ng/ml  Barbiturates               200 ng/ml  Benzodiazepines            150 ng/ml  Cocaine                    150 ng/ml  Methadone                  200 ng/ml  Opiates                    100 ng/ml  Phencyclidine               25 ng/ml  THC                            50 ng/ml  Methamphetamine            500 ng/ml  Tricyclic Antidepressants  300 ng/ml  Oxycodone                  100 ng/ml  Propoxyphene               300 ng/ml  Buprenorphine               10 ng/ml    The normal value for all drugs tested is negative. This report includes unconfirmed screening results, with the cutoff values listed, to be used for medical treatment purposes only.  Unconfirmed results must not be used for non-medical purposes such as employment or legal testing.  Clinical consideration should be applied to any drug of abuse test, particularly when unconfirmed results are used.      Urinalysis With Culture If Indicated - Urine, Catheter In/Out [[id number]]  (Abnormal) Collected: 06/01/22 0318    Specimen: Urine, Catheter In/Out Updated: 06/01/22 0346     Color, UA Yellow     Appearance, UA Clear     pH, UA <=5.0     Specific Gravity, UA 1.016     Glucose,  mg/dL (1+)     Ketones, UA Trace     Bilirubin, UA Negative     Blood, UA Negative      Protein, UA Negative     Leuk Esterase, UA Negative     Nitrite, UA Negative     Urobilinogen, UA 1.0 E.U./dL    Narrative:      In absence of clinical symptoms, the presence of pyuria, bacteria, and/or nitrites on the urinalysis result does not correlate with infection.  Urine microscopic not indicated.    COVID-19 and FLU A/B PCR - Swab, Nasopharynx [[id number]]  (Normal) Collected: 06/01/22 0035    Specimen: Swab from Nasopharynx Updated: 06/01/22 0126     COVID19 Not Detected     Influenza A PCR Not Detected     Influenza B PCR Not Detected    Narrative:      Fact sheet for providers: https://www.fda.gov/media/781859/download    Fact sheet for patients: https://www.fda.gov/media/011495/download    Test performed by PCR.    Lactic Acid, Plasma [[id number]]  (Normal) Collected: 05/31/22 2325    Specimen: Blood Updated: 05/31/22 2341     Lactate 1.5 mmol/L     T4, Free [[id number]]  (Normal) Collected: 05/31/22 1849    Specimen: Blood Updated: 05/31/22 2243     Free T4 1.13 ng/dL     Narrative:      Results may be falsely increased if patient taking Biotin.      Extra Tubes [[id number]] Collected: 05/31/22 1849    Specimen: Blood, Venous Line Updated: 05/31/22 2002    Narrative:      The following orders were created for panel order Extra Tubes.  Procedure                               Abnormality         Status                     ---------                               -----------         ------                     Gold Top - SST[[id number]]                                   Final result                 Please view results for these tests on the individual orders.    Gold Top - SST [[id number]] Collected: 05/31/22 1849    Specimen: Blood Updated: 05/31/22 2002     Extra Tube Hold for add-ons.     Comment: Auto resulted.       aPTT [[id number]]  (Normal) Collected: 05/31/22 1849    Specimen: Blood Updated: 05/31/22 1941     PTT 25.4 seconds     Narrative:      The recommended Heparin therapeutic range is 68-97 seconds.     Protime-INR [[id number]]  (Normal) Collected: 05/31/22 1849    Specimen: Blood Updated: 05/31/22 1941     Protime 12.2 Seconds      INR 0.92    Narrative:      Therapeutic range for most indications is 2.0-3.0 INR,  or 2.5-3.5 for mechanical heart valves.    Comprehensive Metabolic Panel [[id number]]  (Abnormal) Collected: 05/31/22 1849    Specimen: Blood Updated: 05/31/22 1932     Glucose 432 mg/dL      BUN 34 mg/dL      Creatinine 2.86 mg/dL      Sodium 130 mmol/L      Potassium 5.1 mmol/L      Comment: Slight hemolysis detected by analyzer. Results may be affected.        Chloride 95 mmol/L      CO2 23.0 mmol/L      Calcium 8.7 mg/dL      Total Protein 6.4 g/dL      Albumin 3.60 g/dL      ALT (SGPT) 13 U/L      AST (SGOT) 11 U/L      Alkaline Phosphatase 130 U/L      Total Bilirubin 0.2 mg/dL      Globulin 2.8 gm/dL      A/G Ratio 1.3 g/dL      BUN/Creatinine Ratio 11.9     Anion Gap 12.0 mmol/L      eGFR 18.3 mL/min/1.73      Comment: National Kidney Foundation and American Society of Nephrology (ASN) Task Force recommended calculation based on the Chronic Kidney Disease Epidemiology Collaboration (CKD-EPI) equation refit without adjustment for race.       Narrative:      GFR Normal >60  Chronic Kidney Disease <60  Kidney Failure <15      CK [[id number]]  (Normal) Collected: 05/31/22 1849    Specimen: Blood Updated: 05/31/22 1931     Creatine Kinase 127 U/L     Magnesium [[id number]]  (Normal) Collected: 05/31/22 1849    Specimen: Blood Updated: 05/31/22 1931     Magnesium 1.8 mg/dL     Troponin [[id number]]  (Normal) Collected: 05/31/22 1849    Specimen: Blood Updated: 05/31/22 1930     Troponin T <0.010 ng/mL     Narrative:      Troponin T Reference Range:  <= 0.03 ng/mL-   Negative for AMI  >0.03 ng/mL-     Abnormal for myocardial necrosis.  Clinicians would have to utilize clinical acumen, EKG, Troponin and serial changes to determine if it is an Acute Myocardial Infarction or myocardial injury due to an  underlying chronic condition.       Results may be falsely decreased if patient taking Biotin.      BNP [[id number]]  (Normal) Collected: 05/31/22 1849    Specimen: Blood Updated: 05/31/22 1928     proBNP 805.7 pg/mL     Narrative:      Among patients with dyspnea, NT-proBNP is highly sensitive for the detection of acute congestive heart failure. In addition NT-proBNP of <300 pg/ml effectively rules out acute congestive heart failure with 99% negative predictive value.    Results may be falsely decreased if patient taking Biotin.          Imaging Results (Most Recent)     Procedure Component Value Units Date/Time    CT Head Without Contrast [[id number]] Collected: 06/01/22 1032     Updated: 06/01/22 1102    Narrative:      COMPARISON:     5/31/2022    INDICATION: Stroke follow-up    TECHNIQUE: CT of the head was obtained from the vertex through  the skull base.  Reformats are obtained.    All CT scans at this location are performed using dose modulation  techniques as appropriate to a performed exam including the  following: automated exposure control; adjustment of the mA  and/or kV according to patient size (this includes techniques or  standardized protocols for targeted exams where dose is matched  to indication / reason for exam; i.e. extremities or head); use  of iterative reconstruction technique.    FINDINGS: There is prominence of ventricles and sulci consistent  with atrophy. There is hypoattenuation of the periventricular  white matter, consistent with small vessel disease. There is no  mass, mass effect, or midline shift.  There is no abnormal  intra-axial or extra-axial fluid collection or hematoma.  There  is preservation of normal gray-white differentiation.   Atherosclerotic calcification of intracranial arteries. The bony  calvarium is intact.  Hyperostosis frontalis. The visualized  paranasal sinuses and mastoid air cells appear well-aerated.      Impression:      1. No acute intracranial  abnormality.    2. Atrophy.  3. Small vessel ischemic disease.    Electronically signed by:  Pranav Chandler MD  6/1/2022 11:00 AM CDT  Workstation: [id number]    CT Head Without Contrast Stroke Protocol [[id number]] Collected: 05/31/22 1842     Updated: 05/31/22 1927    Addenda:         ADDENDUM   ADDENDUM #1       Critical findings were communicated to  at 7:08 PM on  5/31/2022.     Electronically signed by:  Herberth Foss MD  5/31/2022 7:25 PM CDT  Workstation: [id number]    Signed: 05/31/22 1925 by Herberth Foss MD    Narrative:      INDICATION: Neuro deficit, acute, stroke suspected    EXAMINATION: CT BRAIN - CT HEAD WITHOUT IV CONTRAST    TECHNIQUE:    Multiple axial images were obtained of the head without  intravenous contrast. A radiation dose optimization technique was  used for this scan.  IV Contrast dosage and agent: None.    COMPARISON: None  ____________________________________________    FINDINGS:      BRAIN PARENCHYMA:   No intra- or extra-axial hemorrhage. Grey white differentiation  is preserved. No intracranial mass or mass effect. Posterior  fossa structures are unremarkable.     CSF SPACES: Appropriate for age.  No hydrocephalus.      CALVARIUM, SKULL BASE, PARANASAL SINUSES AND MASTOID AIR CELLS:  Unremarkable     Aspects score: 10      Impression:        No acute intracranial process is identified.    Electronically signed by:  Herberth Foss MD  5/31/2022 6:57 PM CDT  Workstation: [id number]    XR Chest 1 View [[id number]] Collected: 05/31/22 1900     Updated: 05/31/22 1919    Narrative:      XR CHEST 1 VIEW    COMPARISONS: Single view chest dated April 4, 2022    ADDITIONAL PERTINENT HISTORY: TIA    FINDINGS:  Cardiomediastinal silhouette:  Moderate cardiomegaly. Patient  status post median sternotomy.    Pulmonary vasculature:  Negative.    Lung fields:  Marked elevation of the right hemidiaphragm.  Minimal bibasilar regions of scarring.    Pleural spaces: Negative.    Osseous structures:   "Negative.    Surrounding soft tissues:  Surgical clips involving the left  upper quadrant.        Impression:      1. Continued cardiomegaly.  2. Continued elevation of the right hemidiaphragm.  3. No acute cardiopulmonary disease.    Electronically signed by:  Miguel Kyle MD  5/31/2022 7:17 PM CDT  Workstation: [id number]          Chief Complaint on Day of Discharge: weakness    Hospital Course:  This patient is unfortunately very poor historian due to her confusion.  She does state that she developed bilateral leg weakness at 11 am.  Otherwise, she is very vague in her symptoms.  It seems that she has had a fluctuating altered mental status during her stay in the ED.  Initially the patient was being evaluated for slurred speech as well as left facial droop.  When I entered the room the patient was able to speak coherently when she was clearing her mind of her onset and had no facial droop.  It is evident that the patient has had a previous distal left leg amputation.    Patient had her lasix, HCTZ and losartan held and was gently rehydrated with IV fluids.  Her mentation showed significant improvement over the next few days.  She was evaluated by PT/OT who recommended home health services.  Patient was evaluated by neurology and was recommended to continue ASA and a high dose statin.  Her home lasix was decreased to 20mg daily on discharge, and the rest of her home medications was resumed.  She was deemed stable for d/c home on 6/3 with instructions to f/u with her PCP in 1 week.    Condition on Discharge:  stable    Physical Exam on Discharge:  /65 (BP Location: Left arm, Patient Position: Lying)   Pulse 79   Temp 96.6 °F (35.9 °C) (Oral)   Resp 18   Ht 172.7 cm (67.99\")   Wt 124 kg (273 lb 3.2 oz)   SpO2 96%   BMI 41.55 kg/m²   Physical Exam  General: NAD, AAOx3  HEENT: AT NC, EOMI  Neck: No JVD  CVS: S1/S2 present, RRR, no M/R/G  Lungs: CTA B/L  Abdomen: soft, NT, ND, BS+  Ext: L BKA  Skin: no " "rashes, bruises or discolorations  Neuro: no focal deficits  Psych: Not agitated       Discharge Disposition:  Home or Self Care    Discharge Medications:     Discharge Medications      Changes to Medications      Instructions Start Date   furosemide 40 MG tablet  Commonly known as: LASIX  What changed: how much to take   20 mg, Oral, Daily         Continue These Medications      Instructions Start Date   Accu-Chek Guide test strip  Generic drug: glucose blood   1 each, Other, 4 Times Daily, To test blood sugar 4X daily. For  Dx E11.65      accu-chek soft touch lancets   As directed      ARIPiprazole 15 MG tablet  Commonly known as: ABILIFY   TAKE 1 TABLET BY MOUTH EVERY DAY      aspirin  MG tablet   325 mg, Oral, Daily      atorvastatin 80 MG tablet  Commonly known as: LIPITOR   TAKE 1 TABLET BY MOUTH EVERY DAY      B-D ULTRAFINE III SHORT PEN 31G X 8 MM misc  Generic drug: Insulin Pen Needle   Use up to 4 (four) times daily with insulin as directed.      B-D ULTRAFINE III SHORT PEN 31G X 8 MM misc  Generic drug: Insulin Pen Needle   USE TO INJECT 5 TIMES A DAY      BASAGLAR KWIKPEN 100 UNIT/ML injection pen   INJECT 24 UNITS SUBCUTANEOUSLY AT BEDTIME      BD [employer] misc   1 each, Does not apply, Take As Directed      Calcium Citrate-Vitamin D 250-200 MG-UNIT tablet   2 tablets, Oral, 2 Times Daily      clopidogrel 75 MG tablet  Commonly known as: PLAVIX   75 mg, Oral, Daily      cyanocobalamin 1000 MCG/ML injection   1,000 mcg, Intramuscular, Every 28 Days      Easy Touch FlipLock Needles 25G X 1-1/2\" misc  Generic drug: Needle (Disp)   1 each, Does not apply, Every 28 Days, For administering B12 cyanocobalomin. Dx vitamin B12 deficiency.      famotidine 20 MG tablet  Commonly known as: Pepcid   20 mg, Oral, 2 Times Daily PRN      folic acid 1 MG tablet  Commonly known as: FOLVITE   TAKE 1 TABLET BY MOUTH EVERY DAY      gabapentin 400 MG capsule  Commonly known as: NEURONTIN   400 mg, Oral, 3 " "Times Daily      glucose monitor monitoring kit   1 each, Does not apply, Daily, accucheck eve meter, E11.9      hydroCHLOROthiazide 25 MG tablet  Commonly known as: HYDRODIURIL   TAKE 1 TABLET BY MOUTH EVERY DAY      HYDROcodone-acetaminophen 7.5-325 MG per tablet  Commonly known as: NORCO   1 tablet, Oral, Every 6 Hours PRN      Hypodermic Needle 20G X 1\" misc   1 each, Does not apply, Every 28 Days, For drawing up B12 for injection monthly, change back to smaller gauge needle prior to injection. Dx Vitamin B12  Deficiency.      isosorbide mononitrate 30 MG 24 hr tablet  Commonly known as: IMDUR   30 mg, Oral, Every 24 Hours Scheduled      lisinopril 20 MG tablet  Commonly known as: PRINIVIL,ZESTRIL   TAKE 1 TABLET BY MOUTH EVERY DAY      LORazepam 0.5 MG tablet  Commonly known as: ATIVAN   0.5 mg, Oral, Every 8 Hours      meclizine 25 MG tablet  Commonly known as: ANTIVERT   25 mg, Oral, 3 Times Daily PRN      methocarbamol 500 MG tablet  Commonly known as: ROBAXIN   500 mg, Oral, 2 Times Daily PRN      metoclopramide 10 MG tablet  Commonly known as: REGLAN   10 mg, Oral, 4 Times Daily PRN      metoprolol succinate XL 50 MG 24 hr tablet  Commonly known as: TOPROL-XL   50 mg, Oral, Daily, Dose increased 5/24/21      mirtazapine 30 MG tablet  Commonly known as: REMERON   TAKE 1 TABLET BY MOUTH EVERY DAY AT NIGHT      montelukast 10 MG tablet  Commonly known as: SINGULAIR   10 mg, Oral, Nightly, To help with allergies causing runny nose      naloxone 0.4 MG/ML injection  Commonly known as: NARCAN   0.2 mg, Intravenous, Every 5 Minutes PRN      nitroglycerin 0.4 MG SL tablet  Commonly known as: NITROSTAT   0.4 mg, Sublingual, Every 5 Minutes PRN, Take no more than 3 doses in 15 minutes.      NovoLOG 100 UNIT/ML injection  Generic drug: insulin aspart   INJECT 8 UNITS SUBCUTANEOUSLY 3 TIMES DAILY WITH MEALS.      NovoLOG FlexPen 100 UNIT/ML solution pen-injector sc pen  Generic drug: insulin aspart   INJECT 0 TO 14 " "UNITS UNDER THE SKIN 3 TIMES A DAY BEFORE MEALS ACCORDING TO SLIDING SCALE      ondansetron 8 MG tablet  Commonly known as: ZOFRAN   8 mg, Oral, Every 8 Hours PRN      ONE TOUCH ULTRA MINI w/Device kit   Patient will need the Accu-Check Avitio meter      pantoprazole 20 MG EC tablet  Commonly known as: PROTONIX   40 mg, Oral, Daily      ranolazine 500 MG 12 hr tablet  Commonly known as: RANEXA   500 mg, Oral, Every 12 Hours Scheduled      Syringe 20G X 1-1/2\" 3 ML misc   1 each, Does not apply, Every 28 Days, For injection cyanocobalomin, Dx vit B12 deficiency.      venlafaxine  MG 24 hr capsule  Commonly known as: EFFEXOR-XR   150 mg, Oral, 2 Times Daily      vitamin D 1.25 MG (91332 UT) capsule capsule  Commonly known as: ERGOCALCIFEROL   TAKE ONE CAPSULE BY MOUTH EVERY SUNDAY.             Discharge Diet: low salt, diabetic, cardiac    Activity at Discharge: as tolerated    Discharge Care Plan/Instructions: f/u PCP 1 week    Follow-up Appointments:   Future Appointments   Date Time Provider Department [place]   6/3/2022  1:45 PM Elvis Gamboa APRN [facility] END [facility]   7/5/2022  9:15 AM Bill Gibson MD [facility] CD [facility] None   8/1/2022 10:00 AM Marcela Lawson APRN [facility] GE [facility]   8/16/2022  9:00 AM Kimberly Ferguson APRN [facility] PC POW [facility]   8/29/2022 12:45 PM NURSE [facility] OPI [facility]   8/29/2022  1:15 PM Teressa Hammond APRN [facility] ONC [facility]       Test Results Pending at Discharge:   Pending Labs     Order Current Status    Blood Culture - Blood, Arm, Left Preliminary result    Blood Culture - Blood, Hand, Left Preliminary result              Time: less than 30 minutes              "

## 2022-06-03 NOTE — PLAN OF CARE
Problem: Skin Injury Risk Increased  Goal: Skin Health and Integrity  Outcome: Ongoing, Progressing  Intervention: Optimize Skin Protection  Recent Flowsheet Documentation  Taken 6/3/2022 0100 by Salima Michaels RN  Head of Bed (HOB) Positioning: HOB elevated  Taken 6/2/2022 2300 by Salima Michaels RN  Head of Bed (HOB) Positioning: HOB elevated  Taken 6/2/2022 2100 by Salima Michaels RN  Head of Bed (HOB) Positioning: HOB elevated  Taken 6/2/2022 1900 by Salima Michaels RN  Head of Bed (HOB) Positioning: HOB elevated     Problem: Fall Injury Risk  Goal: Absence of Fall and Fall-Related Injury  Outcome: Ongoing, Progressing  Intervention: Promote Injury-Free Environment  Recent Flowsheet Documentation  Taken 6/3/2022 0100 by Salima Michaels RN  Safety Promotion/Fall Prevention:   safety round/check completed   assistive device/personal items within reach  Taken 6/2/2022 2300 by Salima Michaels RN  Safety Promotion/Fall Prevention:   safety round/check completed   assistive device/personal items within reach  Taken 6/2/2022 2100 by Salima Michaels RN  Safety Promotion/Fall Prevention:   safety round/check completed   assistive device/personal items within reach  Taken 6/2/2022 1900 by Salima Michaels RN  Safety Promotion/Fall Prevention:   safety round/check completed   assistive device/personal items within reach     Problem: Adult Inpatient Plan of Care  Goal: Plan of Care Review  Outcome: Ongoing, Progressing  Flowsheets  Taken 6/3/2022 0136 by Salima Michaels RN  Outcome Evaluation: Alert and oriented. VSS. Complains of pain in which PRN pain medicatoions are given. BS is in the 300s. Sliding scale insulin is given. Educated pt on a healthy diabetic diet at this time. Will continue to monitor.  Taken 6/2/2022 1332 by Sarah Irby COTA  Progress: improving  Plan of Care Reviewed With: patient  Goal: Patient-Specific Goal (Individualized)  Outcome: Ongoing, Progressing  Goal: Absence of  Hospital-Acquired Illness or Injury  Outcome: Ongoing, Progressing  Intervention: Identify and Manage Fall Risk  Recent Flowsheet Documentation  Taken 6/3/2022 0100 by Salima Michaels RN  Safety Promotion/Fall Prevention:   safety round/check completed   assistive device/personal items within reach  Taken 6/2/2022 2300 by Salima Michaels RN  Safety Promotion/Fall Prevention:   safety round/check completed   assistive device/personal items within reach  Taken 6/2/2022 2100 by Salima Michaels RN  Safety Promotion/Fall Prevention:   safety round/check completed   assistive device/personal items within reach  Taken 6/2/2022 1900 by Salima Michaels RN  Safety Promotion/Fall Prevention:   safety round/check completed   assistive device/personal items within reach  Intervention: Prevent Skin Injury  Recent Flowsheet Documentation  Taken 6/3/2022 0100 by Salima Michaels RN  Body Position:   left   tilted  Taken 6/2/2022 2300 by Salima Michaels RN  Body Position: supine  Taken 6/2/2022 2100 by Salima Michaels RN  Body Position:   tilted   left  Taken 6/2/2022 1900 by Salima Michaels RN  Body Position: sitting up in bed  Intervention: Prevent and Manage VTE (Venous Thromboembolism) Risk  Recent Flowsheet Documentation  Taken 6/3/2022 0100 by Salima Michaels RN  Activity Management: activity adjusted per tolerance  Taken 6/2/2022 2300 by Sailma Michaels RN  Activity Management: activity adjusted per tolerance  Taken 6/2/2022 2157 by Salima Michaels RN  VTE Prevention/Management: (lovenox) other (see comments)  Range of Motion: active ROM (range of motion) encouraged  Taken 6/2/2022 2100 by Salima Michaels RN  Activity Management: activity adjusted per tolerance  Taken 6/2/2022 1900 by Salima Michaels RN  Activity Management: activity adjusted per tolerance  Intervention: Prevent Infection  Recent Flowsheet Documentation  Taken 6/2/2022 2157 by Salima Michaels RN  Infection Prevention:   rest/sleep promoted   hand  hygiene promoted  Goal: Optimal Comfort and Wellbeing  Outcome: Ongoing, Progressing  Intervention: Monitor Pain and Promote Comfort  Recent Flowsheet Documentation  Taken 6/2/2022 2157 by Salima Michaels RN  Pain Management Interventions: see MAR  Intervention: Provide Person-Centered Care  Recent Flowsheet Documentation  Taken 6/2/2022 2157 by Salima Michaels RN  Trust Relationship/Rapport: care explained  Goal: Readiness for Transition of Care  Outcome: Ongoing, Progressing     Problem: Diabetes Comorbidity  Goal: Blood Glucose Level Within Targeted Range  Outcome: Ongoing, Progressing  Intervention: Monitor and Manage Glycemia  Recent Flowsheet Documentation  Taken 6/2/2022 2157 by Salima Michaels RN  Glycemic Management: blood glucose monitored     Problem: Hypertension Comorbidity  Goal: Blood Pressure in Desired Range  Outcome: Ongoing, Progressing     Problem: Adjustment to Illness (Stroke, Ischemic/Transient Ischemic Attack)  Goal: Optimal Coping  Outcome: Ongoing, Progressing  Intervention: Support Psychosocial Response to Stroke  Recent Flowsheet Documentation  Taken 6/2/2022 2157 by Salima Michaels RN  Supportive Measures: active listening utilized     Problem: Bowel Elimination Impaired (Stroke, Ischemic/Transient Ischemic Attack)  Goal: Effective Bowel Elimination  Outcome: Ongoing, Progressing     Problem: Cerebral Tissue Perfusion (Stroke, Ischemic/Transient Ischemic Attack)  Goal: Optimal Cerebral Tissue Perfusion  Outcome: Ongoing, Progressing     Problem: Cognitive Impairment (Stroke, Ischemic/Transient Ischemic Attack)  Goal: Optimal Cognitive Function  Outcome: Ongoing, Progressing     Problem: Communication Impairment (Stroke, Ischemic/Transient Ischemic Attack)  Goal: Improved Communication Skills  Outcome: Ongoing, Progressing     Problem: Functional Ability Impaired (Stroke, Ischemic/Transient Ischemic Attack)  Goal: Optimal Functional Ability  Outcome: Ongoing,  Progressing  Intervention: Optimize Functional Ability  Recent Flowsheet Documentation  Taken 6/3/2022 0100 by Salima Michaels RN  Activity Management: activity adjusted per tolerance  Taken 6/2/2022 2300 by Salima Michaels RN  Activity Management: activity adjusted per tolerance  Taken 6/2/2022 2100 by Salima Michaels RN  Activity Management: activity adjusted per tolerance  Taken 6/2/2022 1900 by Salima Michaels RN  Activity Management: activity adjusted per tolerance     Problem: Respiratory Compromise (Stroke, Ischemic/Transient Ischemic Attack)  Goal: Effective Oxygenation and Ventilation  Outcome: Ongoing, Progressing  Intervention: Optimize Oxygenation and Ventilation  Recent Flowsheet Documentation  Taken 6/3/2022 0100 by Salima Michaels RN  Head of Bed (HOB) Positioning: HOB elevated  Taken 6/2/2022 2300 by Salima Michaels RN  Head of Bed (HOB) Positioning: HOB elevated  Taken 6/2/2022 2100 by Salima Michaels RN  Head of Bed (HOB) Positioning: HOB elevated  Taken 6/2/2022 1900 by Salima Michaels RN  Head of Bed (HOB) Positioning: HOB elevated     Problem: Sensorimotor Impairment (Stroke, Ischemic/Transient Ischemic Attack)  Goal: Improved Sensorimotor Function  Outcome: Ongoing, Progressing  Intervention: Optimize Range of Motion, Motor Control and Function  Recent Flowsheet Documentation  Taken 6/3/2022 0100 by Salima Michaels RN  Positioning/Transfer Devices:   pillows   in use  Taken 6/2/2022 2300 by Salima Michaels RN  Positioning/Transfer Devices:   pillows   in use  Taken 6/2/2022 2157 by Salima Michaels RN  Range of Motion: active ROM (range of motion) encouraged  Taken 6/2/2022 2100 by Salima Michaels RN  Positioning/Transfer Devices: pillows     Problem: Swallowing Impairment (Stroke, Ischemic/Transient Ischemic Attack)  Goal: Optimal Eating and Swallowing without Aspiration  Outcome: Ongoing, Progressing     Problem: Urinary Elimination Impaired (Stroke, Ischemic/Transient Ischemic  Attack)  Goal: Effective Urinary Elimination  Outcome: Ongoing, Progressing   Goal Outcome Evaluation:              Outcome Evaluation: Alert and oriented. VSS. Complains of pain in which PRN pain medicatoions are given. BS is in the 300s. Sliding scale insulin is given. Educated pt on a healthy diabetic diet at this time. Will continue to monitor.

## 2022-06-03 NOTE — OUTREACH NOTE
Prep Survey    Flowsheet Row Responses   [facility] patient discharged from? [place]   Is LACE score < 7 ? No   Emergency Room discharge w/ pulse ox? No   Eligibility [facility]   Date of Admission 05/31/22   Date of Discharge 06/03/22   Discharge Disposition Home-Health Care Svc   Discharge diagnosis AMS,  CHON on CKD, hypotension   Does the patient have one of the following disease processes/diagnoses(primary or secondary)? Other   Does the patient have Home health ordered? Yes   What is the Home health agency?  [facility]   Is there a DME ordered? No   Prep survey completed? Yes          MALLIKA HARRISON - Registered Nurse

## 2022-06-04 RX ORDER — FUROSEMIDE 20 MG/1
20 TABLET ORAL DAILY
Qty: 30 TABLET | Refills: 0 | Status: SHIPPED | OUTPATIENT
Start: 2022-06-04 | End: 2022-06-17 | Stop reason: HOSPADM

## 2022-06-05 ENCOUNTER — HOSPITAL ENCOUNTER (INPATIENT)
Facility: HOSPITAL | Age: 62
LOS: 11 days | Discharge: HOME | End: 2022-06-17
Attending: EMERGENCY MEDICINE | Admitting: INTERNAL MEDICINE

## 2022-06-05 ENCOUNTER — APPOINTMENT (OUTPATIENT)
Dept: GENERAL RADIOLOGY | Facility: HOSPITAL | Age: 62
End: 2022-06-05

## 2022-06-05 ENCOUNTER — APPOINTMENT (OUTPATIENT)
Dept: ULTRASOUND IMAGING | Facility: HOSPITAL | Age: 62
End: 2022-06-05

## 2022-06-05 DIAGNOSIS — G62.9 NEUROPATHY: ICD-10-CM

## 2022-06-05 DIAGNOSIS — R13.10 DYSPHAGIA, UNSPECIFIED TYPE: ICD-10-CM

## 2022-06-05 DIAGNOSIS — R79.89 ELEVATED D-DIMER: ICD-10-CM

## 2022-06-05 DIAGNOSIS — R06.02 SHORTNESS OF BREATH: ICD-10-CM

## 2022-06-05 DIAGNOSIS — N18.9 CHRONIC KIDNEY DISEASE, UNSPECIFIED CKD STAGE: ICD-10-CM

## 2022-06-05 DIAGNOSIS — R07.9 CHEST PAIN, UNSPECIFIED TYPE: Primary | ICD-10-CM

## 2022-06-05 LAB
ALBUMIN SERPL-MCNC: 3.8 G/DL (ref 3.5–5.2)
ALBUMIN/GLOB SERPL: 1.4 G/DL
ALP SERPL-CCNC: 105 U/L (ref 39–117)
ALT SERPL W P-5'-P-CCNC: 15 U/L (ref 1–33)
ANION GAP SERPL CALCULATED.3IONS-SCNC: 18 MMOL/L (ref 5–15)
AST SERPL-CCNC: 15 U/L (ref 1–32)
BACTERIA SPEC AEROBE CULT: NORMAL
BACTERIA SPEC AEROBE CULT: NORMAL
BACTERIA UR QL AUTO: ABNORMAL /HPF
BASOPHILS # BLD AUTO: 0.07 10*3/MM3 (ref 0–0.2)
BASOPHILS NFR BLD AUTO: 0.7 % (ref 0–1.5)
BILIRUB SERPL-MCNC: 0.2 MG/DL (ref 0–1.2)
BILIRUB UR QL STRIP: NEGATIVE
BUN SERPL-MCNC: 19 MG/DL (ref 8–23)
BUN/CREAT SERPL: 15.1 (ref 7–25)
CALCIUM SPEC-SCNC: 9.3 MG/DL (ref 8.6–10.5)
CHLORIDE SERPL-SCNC: 103 MMOL/L (ref 98–107)
CLARITY UR: ABNORMAL
CO2 SERPL-SCNC: 19 MMOL/L (ref 22–29)
COLOR UR: YELLOW
CREAT SERPL-MCNC: 1.26 MG/DL (ref 0.57–1)
D-DIMER, QUANTITATIVE (MAD,POW, STR): 1193 NG/ML (FEU) (ref 0–470)
DEPRECATED RDW RBC AUTO: 44 FL (ref 37–54)
EGFRCR SERPLBLD CKD-EPI 2021: 49 ML/MIN/1.73
EOSINOPHIL # BLD AUTO: 0.4 10*3/MM3 (ref 0–0.4)
EOSINOPHIL NFR BLD AUTO: 4 % (ref 0.3–6.2)
ERYTHROCYTE [DISTWIDTH] IN BLOOD BY AUTOMATED COUNT: 13.6 % (ref 12.3–15.4)
FLUAV RNA RESP QL NAA+PROBE: NOT DETECTED
FLUBV RNA RESP QL NAA+PROBE: NOT DETECTED
GLOBULIN UR ELPH-MCNC: 2.7 GM/DL
GLUCOSE SERPL-MCNC: 159 MG/DL (ref 65–99)
GLUCOSE UR STRIP-MCNC: NEGATIVE MG/DL
HCT VFR BLD AUTO: 31.9 % (ref 34–46.6)
HGB BLD-MCNC: 10.7 G/DL (ref 12–15.9)
HGB UR QL STRIP.AUTO: NEGATIVE
HYALINE CASTS UR QL AUTO: ABNORMAL /LPF
HYPOCHROMIA BLD QL: NORMAL
IMM GRANULOCYTES # BLD AUTO: 0.06 10*3/MM3 (ref 0–0.05)
IMM GRANULOCYTES NFR BLD AUTO: 0.6 % (ref 0–0.5)
KETONES UR QL STRIP: NEGATIVE
LEUKOCYTE ESTERASE UR QL STRIP.AUTO: ABNORMAL
LIPASE SERPL-CCNC: 9 U/L (ref 13–60)
LYMPHOCYTES # BLD AUTO: 2.92 10*3/MM3 (ref 0.7–3.1)
LYMPHOCYTES NFR BLD AUTO: 28.9 % (ref 19.6–45.3)
MCH RBC QN AUTO: 29.7 PG (ref 26.6–33)
MCHC RBC AUTO-ENTMCNC: 33.5 G/DL (ref 31.5–35.7)
MCV RBC AUTO: 88.6 FL (ref 79–97)
MONOCYTES # BLD AUTO: 0.75 10*3/MM3 (ref 0.1–0.9)
MONOCYTES NFR BLD AUTO: 7.4 % (ref 5–12)
NEUTROPHILS NFR BLD AUTO: 5.9 10*3/MM3 (ref 1.7–7)
NEUTROPHILS NFR BLD AUTO: 58.4 % (ref 42.7–76)
NITRITE UR QL STRIP: NEGATIVE
NRBC BLD AUTO-RTO: 0 /100 WBC (ref 0–0.2)
PH UR STRIP.AUTO: 5.5 [PH] (ref 5–9)
PLAT MORPH BLD: NORMAL
PLATELET # BLD AUTO: 292 10*3/MM3 (ref 140–450)
PMV BLD AUTO: 11.1 FL (ref 6–12)
POTASSIUM SERPL-SCNC: 4.7 MMOL/L (ref 3.5–5.2)
PROCALCITONIN SERPL-MCNC: 0.04 NG/ML (ref 0–0.25)
PROT SERPL-MCNC: 6.5 G/DL (ref 6–8.5)
PROT UR QL STRIP: NEGATIVE
RBC # BLD AUTO: 3.6 10*6/MM3 (ref 3.77–5.28)
RBC # UR STRIP: ABNORMAL /HPF
REF LAB TEST METHOD: ABNORMAL
SARS-COV-2 RNA RESP QL NAA+PROBE: NOT DETECTED
SODIUM SERPL-SCNC: 140 MMOL/L (ref 136–145)
SP GR UR STRIP: 1.01 (ref 1–1.03)
SQUAMOUS #/AREA URNS HPF: ABNORMAL /HPF
TROPONIN T SERPL-MCNC: <0.01 NG/ML (ref 0–0.03)
UROBILINOGEN UR QL STRIP: ABNORMAL
WBC # UR STRIP: ABNORMAL /HPF
WBC MORPH BLD: NORMAL
WBC NRBC COR # BLD: 10.1 10*3/MM3 (ref 3.4–10.8)
YEAST URNS QL MICRO: ABNORMAL /HPF

## 2022-06-05 PROCEDURE — 85007 BL SMEAR W/DIFF WBC COUNT: CPT | Performed by: EMERGENCY MEDICINE

## 2022-06-05 PROCEDURE — 85379 FIBRIN DEGRADATION QUANT: CPT | Performed by: EMERGENCY MEDICINE

## 2022-06-05 PROCEDURE — 83690 ASSAY OF LIPASE: CPT | Performed by: EMERGENCY MEDICINE

## 2022-06-05 PROCEDURE — 93971 EXTREMITY STUDY: CPT

## 2022-06-05 PROCEDURE — 87636 SARSCOV2 & INF A&B AMP PRB: CPT | Performed by: EMERGENCY MEDICINE

## 2022-06-05 PROCEDURE — 84484 ASSAY OF TROPONIN QUANT: CPT | Performed by: EMERGENCY MEDICINE

## 2022-06-05 PROCEDURE — 93010 ELECTROCARDIOGRAM REPORT: CPT | Performed by: INTERNAL MEDICINE

## 2022-06-05 PROCEDURE — 71045 X-RAY EXAM CHEST 1 VIEW: CPT

## 2022-06-05 PROCEDURE — 85025 COMPLETE CBC W/AUTO DIFF WBC: CPT | Performed by: EMERGENCY MEDICINE

## 2022-06-05 PROCEDURE — 36415 COLL VENOUS BLD VENIPUNCTURE: CPT | Performed by: EMERGENCY MEDICINE

## 2022-06-05 PROCEDURE — 81001 URINALYSIS AUTO W/SCOPE: CPT | Performed by: EMERGENCY MEDICINE

## 2022-06-05 PROCEDURE — 84145 PROCALCITONIN (PCT): CPT | Performed by: EMERGENCY MEDICINE

## 2022-06-05 PROCEDURE — 80053 COMPREHEN METABOLIC PANEL: CPT | Performed by: EMERGENCY MEDICINE

## 2022-06-05 PROCEDURE — 93005 ELECTROCARDIOGRAM TRACING: CPT | Performed by: EMERGENCY MEDICINE

## 2022-06-05 PROCEDURE — 93005 ELECTROCARDIOGRAM TRACING: CPT

## 2022-06-05 PROCEDURE — 99285 EMERGENCY DEPT VISIT HI MDM: CPT

## 2022-06-05 RX ORDER — HYDROCODONE BITARTRATE AND ACETAMINOPHEN 7.5; 325 MG/1; MG/1
1 TABLET ORAL ONCE
Status: COMPLETED | OUTPATIENT
Start: 2022-06-05 | End: 2022-06-05

## 2022-06-05 RX ORDER — PANTOPRAZOLE SODIUM 40 MG/10ML
40 INJECTION, POWDER, LYOPHILIZED, FOR SOLUTION INTRAVENOUS ONCE
Status: COMPLETED | OUTPATIENT
Start: 2022-06-05 | End: 2022-06-05

## 2022-06-05 RX ORDER — ASPIRIN 325 MG
325 TABLET ORAL ONCE
Status: COMPLETED | OUTPATIENT
Start: 2022-06-05 | End: 2022-06-05

## 2022-06-05 RX ADMIN — ASPIRIN 325 MG: 325 TABLET ORAL at 18:50

## 2022-06-05 RX ADMIN — PANTOPRAZOLE SODIUM 40 MG: 40 INJECTION, POWDER, FOR SOLUTION INTRAVENOUS at 22:29

## 2022-06-05 RX ADMIN — HYDROCODONE BITARTRATE AND ACETAMINOPHEN 1 TABLET: 7.5; 325 TABLET ORAL at 21:19

## 2022-06-06 ENCOUNTER — APPOINTMENT (OUTPATIENT)
Dept: ULTRASOUND IMAGING | Facility: HOSPITAL | Age: 62
End: 2022-06-06

## 2022-06-06 ENCOUNTER — APPOINTMENT (OUTPATIENT)
Dept: INTERVENTIONAL RADIOLOGY/VASCULAR | Facility: HOSPITAL | Age: 62
End: 2022-06-06

## 2022-06-06 ENCOUNTER — TRANSITIONAL CARE MANAGEMENT TELEPHONE ENCOUNTER (OUTPATIENT)
Dept: CALL CENTER | Facility: HOSPITAL | Age: 62
End: 2022-06-06

## 2022-06-06 ENCOUNTER — APPOINTMENT (OUTPATIENT)
Dept: NUCLEAR MEDICINE | Facility: HOSPITAL | Age: 62
End: 2022-06-06

## 2022-06-06 ENCOUNTER — READMISSION MANAGEMENT (OUTPATIENT)
Dept: CALL CENTER | Facility: HOSPITAL | Age: 62
End: 2022-06-06

## 2022-06-06 ENCOUNTER — APPOINTMENT (OUTPATIENT)
Dept: CT IMAGING | Facility: HOSPITAL | Age: 62
End: 2022-06-06

## 2022-06-06 PROBLEM — R79.89 ELEVATED D-DIMER: Status: ACTIVE | Noted: 2022-06-06

## 2022-06-06 LAB
CHOLEST SERPL-MCNC: 169 MG/DL (ref 0–200)
HDLC SERPL-MCNC: 41 MG/DL (ref 40–60)
LDLC SERPL CALC-MCNC: 90 MG/DL (ref 0–100)
LDLC/HDLC SERPL: 2.01 {RATIO}
TRIGL SERPL-MCNC: 227 MG/DL (ref 0–150)
TROPONIN T SERPL-MCNC: <0.01 NG/ML (ref 0–0.03)
VLDLC SERPL-MCNC: 38 MG/DL (ref 5–40)

## 2022-06-06 PROCEDURE — 25010000002 ONDANSETRON PER 1 MG: Performed by: INTERNAL MEDICINE

## 2022-06-06 PROCEDURE — 25010000002 ENOXAPARIN PER 10 MG: Performed by: INTERNAL MEDICINE

## 2022-06-06 PROCEDURE — 0 TECHNETIUM TC 99M PENTETATE KIT: Performed by: INTERNAL MEDICINE

## 2022-06-06 PROCEDURE — 63710000001 INSULIN DETEMIR PER 5 UNITS: Performed by: INTERNAL MEDICINE

## 2022-06-06 PROCEDURE — 76937 US GUIDE VASCULAR ACCESS: CPT

## 2022-06-06 PROCEDURE — G0378 HOSPITAL OBSERVATION PER HR: HCPCS

## 2022-06-06 PROCEDURE — 94799 UNLISTED PULMONARY SVC/PX: CPT

## 2022-06-06 PROCEDURE — A9540 TC99M MAA: HCPCS | Performed by: INTERNAL MEDICINE

## 2022-06-06 PROCEDURE — 25010000002 LORAZEPAM PER 2 MG: Performed by: INTERNAL MEDICINE

## 2022-06-06 PROCEDURE — 36410 VNPNXR 3YR/> PHY/QHP DX/THER: CPT

## 2022-06-06 PROCEDURE — 78582 LUNG VENTILAT&PERFUS IMAGING: CPT

## 2022-06-06 PROCEDURE — 84484 ASSAY OF TROPONIN QUANT: CPT | Performed by: INTERNAL MEDICINE

## 2022-06-06 PROCEDURE — 0 TECHNETIUM ALBUMIN AGGREGATED: Performed by: INTERNAL MEDICINE

## 2022-06-06 PROCEDURE — 25010000002 MORPHINE PER 10 MG: Performed by: INTERNAL MEDICINE

## 2022-06-06 PROCEDURE — C1751 CATH, INF, PER/CENT/MIDLINE: HCPCS

## 2022-06-06 PROCEDURE — 82962 GLUCOSE BLOOD TEST: CPT

## 2022-06-06 PROCEDURE — 36415 COLL VENOUS BLD VENIPUNCTURE: CPT | Performed by: INTERNAL MEDICINE

## 2022-06-06 PROCEDURE — 94640 AIRWAY INHALATION TREATMENT: CPT

## 2022-06-06 PROCEDURE — 94760 N-INVAS EAR/PLS OXIMETRY 1: CPT

## 2022-06-06 PROCEDURE — 80061 LIPID PANEL: CPT | Performed by: INTERNAL MEDICINE

## 2022-06-06 PROCEDURE — A9539 TC99M PENTETATE: HCPCS | Performed by: INTERNAL MEDICINE

## 2022-06-06 RX ORDER — METHOCARBAMOL 500 MG/1
500 TABLET, FILM COATED ORAL 2 TIMES DAILY PRN
Status: DISCONTINUED | OUTPATIENT
Start: 2022-06-06 | End: 2022-06-17 | Stop reason: HOSPADM

## 2022-06-06 RX ORDER — ACETAMINOPHEN 650 MG/1
650 SUPPOSITORY RECTAL EVERY 4 HOURS PRN
Status: DISCONTINUED | OUTPATIENT
Start: 2022-06-06 | End: 2022-06-17 | Stop reason: HOSPADM

## 2022-06-06 RX ORDER — ACETAMINOPHEN 160 MG/5ML
650 SOLUTION ORAL EVERY 4 HOURS PRN
Status: DISCONTINUED | OUTPATIENT
Start: 2022-06-06 | End: 2022-06-06 | Stop reason: SDUPTHER

## 2022-06-06 RX ORDER — ATORVASTATIN CALCIUM 40 MG/1
80 TABLET, FILM COATED ORAL DAILY
Status: DISCONTINUED | OUTPATIENT
Start: 2022-06-06 | End: 2022-06-17 | Stop reason: HOSPADM

## 2022-06-06 RX ORDER — RANOLAZINE 500 MG/1
500 TABLET, EXTENDED RELEASE ORAL EVERY 12 HOURS SCHEDULED
Status: DISCONTINUED | OUTPATIENT
Start: 2022-06-06 | End: 2022-06-17 | Stop reason: HOSPADM

## 2022-06-06 RX ORDER — CALCIUM CARBONATE 200(500)MG
1 TABLET,CHEWABLE ORAL 2 TIMES DAILY PRN
Status: DISCONTINUED | OUTPATIENT
Start: 2022-06-06 | End: 2022-06-17 | Stop reason: HOSPADM

## 2022-06-06 RX ORDER — BENZONATATE 100 MG/1
100 CAPSULE ORAL 3 TIMES DAILY PRN
Status: DISCONTINUED | OUTPATIENT
Start: 2022-06-06 | End: 2022-06-17 | Stop reason: HOSPADM

## 2022-06-06 RX ORDER — ENOXAPARIN SODIUM 100 MG/ML
40 INJECTION SUBCUTANEOUS EVERY 24 HOURS
Status: DISCONTINUED | OUTPATIENT
Start: 2022-06-06 | End: 2022-06-17 | Stop reason: HOSPADM

## 2022-06-06 RX ORDER — TRAMADOL HYDROCHLORIDE 50 MG/1
50 TABLET ORAL EVERY 6 HOURS PRN
Status: DISPENSED | OUTPATIENT
Start: 2022-06-06 | End: 2022-06-13

## 2022-06-06 RX ORDER — MECLIZINE HYDROCHLORIDE 25 MG/1
25 TABLET ORAL 3 TIMES DAILY PRN
Status: DISCONTINUED | OUTPATIENT
Start: 2022-06-06 | End: 2022-06-17 | Stop reason: HOSPADM

## 2022-06-06 RX ORDER — ONDANSETRON 2 MG/ML
4 INJECTION INTRAMUSCULAR; INTRAVENOUS EVERY 6 HOURS PRN
Status: DISCONTINUED | OUTPATIENT
Start: 2022-06-06 | End: 2022-06-17 | Stop reason: HOSPADM

## 2022-06-06 RX ORDER — FUROSEMIDE 20 MG/1
20 TABLET ORAL DAILY
Status: DISCONTINUED | OUTPATIENT
Start: 2022-06-06 | End: 2022-06-07

## 2022-06-06 RX ORDER — METOPROLOL SUCCINATE 50 MG/1
50 TABLET, EXTENDED RELEASE ORAL DAILY
Status: DISCONTINUED | OUTPATIENT
Start: 2022-06-06 | End: 2022-06-17 | Stop reason: HOSPADM

## 2022-06-06 RX ORDER — LISINOPRIL 20 MG/1
20 TABLET ORAL DAILY
Status: DISCONTINUED | OUTPATIENT
Start: 2022-06-06 | End: 2022-06-07

## 2022-06-06 RX ORDER — MIRTAZAPINE 15 MG/1
30 TABLET, FILM COATED ORAL NIGHTLY
Status: DISCONTINUED | OUTPATIENT
Start: 2022-06-06 | End: 2022-06-13

## 2022-06-06 RX ORDER — GABAPENTIN 400 MG/1
400 CAPSULE ORAL 3 TIMES DAILY
Status: DISCONTINUED | OUTPATIENT
Start: 2022-06-06 | End: 2022-06-13

## 2022-06-06 RX ORDER — SODIUM CHLORIDE 0.9 % (FLUSH) 0.9 %
10 SYRINGE (ML) INJECTION EVERY 12 HOURS SCHEDULED
Status: DISCONTINUED | OUTPATIENT
Start: 2022-06-06 | End: 2022-06-17 | Stop reason: HOSPADM

## 2022-06-06 RX ORDER — NITROGLYCERIN 0.4 MG/1
0.4 TABLET SUBLINGUAL
Status: DISCONTINUED | OUTPATIENT
Start: 2022-06-06 | End: 2022-06-17 | Stop reason: HOSPADM

## 2022-06-06 RX ORDER — IPRATROPIUM BROMIDE AND ALBUTEROL SULFATE 2.5; .5 MG/3ML; MG/3ML
3 SOLUTION RESPIRATORY (INHALATION)
Status: DISCONTINUED | OUTPATIENT
Start: 2022-06-06 | End: 2022-06-17 | Stop reason: HOSPADM

## 2022-06-06 RX ORDER — ARIPIPRAZOLE 15 MG/1
15 TABLET ORAL DAILY
Status: DISCONTINUED | OUTPATIENT
Start: 2022-06-06 | End: 2022-06-13

## 2022-06-06 RX ORDER — SODIUM CHLORIDE 0.9 % (FLUSH) 0.9 %
10 SYRINGE (ML) INJECTION AS NEEDED
Status: DISCONTINUED | OUTPATIENT
Start: 2022-06-06 | End: 2022-06-17 | Stop reason: HOSPADM

## 2022-06-06 RX ORDER — FOLIC ACID 1 MG/1
1000 TABLET ORAL DAILY
Status: DISCONTINUED | OUTPATIENT
Start: 2022-06-06 | End: 2022-06-17 | Stop reason: HOSPADM

## 2022-06-06 RX ORDER — LANOLIN ALCOHOL/MO/W.PET/CERES
6 CREAM (GRAM) TOPICAL NIGHTLY PRN
Status: DISCONTINUED | OUTPATIENT
Start: 2022-06-06 | End: 2022-06-17 | Stop reason: HOSPADM

## 2022-06-06 RX ORDER — PANTOPRAZOLE SODIUM 40 MG/1
40 TABLET, DELAYED RELEASE ORAL DAILY
Status: DISCONTINUED | OUTPATIENT
Start: 2022-06-06 | End: 2022-06-17 | Stop reason: HOSPADM

## 2022-06-06 RX ORDER — NALOXONE HCL 0.4 MG/ML
0.4 VIAL (ML) INJECTION
Status: DISCONTINUED | OUTPATIENT
Start: 2022-06-06 | End: 2022-06-17 | Stop reason: HOSPADM

## 2022-06-06 RX ORDER — ISOSORBIDE MONONITRATE 30 MG/1
30 TABLET, EXTENDED RELEASE ORAL
Status: DISCONTINUED | OUTPATIENT
Start: 2022-06-06 | End: 2022-06-17 | Stop reason: HOSPADM

## 2022-06-06 RX ORDER — LORAZEPAM 0.5 MG/1
0.5 TABLET ORAL EVERY 8 HOURS
Status: DISCONTINUED | OUTPATIENT
Start: 2022-06-06 | End: 2022-06-12

## 2022-06-06 RX ORDER — SODIUM CHLORIDE 0.9 % (FLUSH) 0.9 %
20 SYRINGE (ML) INJECTION AS NEEDED
Status: DISCONTINUED | OUTPATIENT
Start: 2022-06-06 | End: 2022-06-17 | Stop reason: HOSPADM

## 2022-06-06 RX ORDER — MONTELUKAST SODIUM 10 MG/1
10 TABLET ORAL NIGHTLY
Status: DISCONTINUED | OUTPATIENT
Start: 2022-06-06 | End: 2022-06-17 | Stop reason: HOSPADM

## 2022-06-06 RX ORDER — ALBUTEROL SULFATE 2.5 MG/3ML
2.5 SOLUTION RESPIRATORY (INHALATION) 4 TIMES DAILY PRN
Status: DISCONTINUED | OUTPATIENT
Start: 2022-06-06 | End: 2022-06-17 | Stop reason: HOSPADM

## 2022-06-06 RX ORDER — CLOPIDOGREL BISULFATE 75 MG/1
75 TABLET ORAL DAILY
Status: DISCONTINUED | OUTPATIENT
Start: 2022-06-06 | End: 2022-06-17 | Stop reason: HOSPADM

## 2022-06-06 RX ORDER — NITROGLYCERIN 0.4 MG/1
0.4 TABLET SUBLINGUAL
Status: DISCONTINUED | OUTPATIENT
Start: 2022-06-06 | End: 2022-06-06

## 2022-06-06 RX ORDER — ACETAMINOPHEN 325 MG/1
650 TABLET ORAL EVERY 4 HOURS PRN
Status: DISCONTINUED | OUTPATIENT
Start: 2022-06-06 | End: 2022-06-17 | Stop reason: HOSPADM

## 2022-06-06 RX ORDER — LORAZEPAM 2 MG/ML
1 INJECTION INTRAMUSCULAR ONCE
Status: COMPLETED | OUTPATIENT
Start: 2022-06-06 | End: 2022-06-06

## 2022-06-06 RX ORDER — DOXYCYCLINE 100 MG/1
100 CAPSULE ORAL EVERY 12 HOURS SCHEDULED
Status: COMPLETED | OUTPATIENT
Start: 2022-06-06 | End: 2022-06-10

## 2022-06-06 RX ORDER — ASPIRIN 325 MG
325 TABLET, DELAYED RELEASE (ENTERIC COATED) ORAL DAILY
Status: DISCONTINUED | OUTPATIENT
Start: 2022-06-06 | End: 2022-06-17 | Stop reason: HOSPADM

## 2022-06-06 RX ORDER — HYDROCODONE BITARTRATE AND ACETAMINOPHEN 7.5; 325 MG/1; MG/1
1 TABLET ORAL EVERY 6 HOURS PRN
Status: DISCONTINUED | OUTPATIENT
Start: 2022-06-06 | End: 2022-06-17 | Stop reason: HOSPADM

## 2022-06-06 RX ADMIN — LISINOPRIL 20 MG: 20 TABLET ORAL at 12:00

## 2022-06-06 RX ADMIN — PANTOPRAZOLE SODIUM 40 MG: 40 TABLET, DELAYED RELEASE ORAL at 12:00

## 2022-06-06 RX ADMIN — ISOSORBIDE MONONITRATE 30 MG: 30 TABLET, EXTENDED RELEASE ORAL at 12:00

## 2022-06-06 RX ADMIN — MONTELUKAST 10 MG: 10 TABLET, FILM COATED ORAL at 21:41

## 2022-06-06 RX ADMIN — IPRATROPIUM BROMIDE AND ALBUTEROL SULFATE 3 ML: 2.5; .5 SOLUTION RESPIRATORY (INHALATION) at 07:47

## 2022-06-06 RX ADMIN — ASPIRIN 325 MG: 325 TABLET, COATED ORAL at 12:00

## 2022-06-06 RX ADMIN — KIT FOR THE PREPARATION OF TECHNETIUM TC 99M PENTETATE 1 DOSE: 20 INJECTION, POWDER, LYOPHILIZED, FOR SOLUTION INTRAVENOUS; RESPIRATORY (INHALATION) at 14:10

## 2022-06-06 RX ADMIN — MORPHINE SULFATE 4 MG: 4 INJECTION, SOLUTION INTRAMUSCULAR; INTRAVENOUS at 22:03

## 2022-06-06 RX ADMIN — CLOPIDOGREL BISULFATE 75 MG: 75 TABLET ORAL at 12:00

## 2022-06-06 RX ADMIN — MIRTAZAPINE 30 MG: 15 TABLET, FILM COATED ORAL at 21:41

## 2022-06-06 RX ADMIN — FOLIC ACID 1000 MCG: 1 TABLET ORAL at 12:00

## 2022-06-06 RX ADMIN — MORPHINE SULFATE 4 MG: 4 INJECTION, SOLUTION INTRAMUSCULAR; INTRAVENOUS at 09:05

## 2022-06-06 RX ADMIN — METOPROLOL SUCCINATE 50 MG: 50 TABLET, EXTENDED RELEASE ORAL at 12:00

## 2022-06-06 RX ADMIN — ARIPIPRAZOLE 15 MG: 15 TABLET ORAL at 12:01

## 2022-06-06 RX ADMIN — GABAPENTIN 400 MG: 400 CAPSULE ORAL at 21:41

## 2022-06-06 RX ADMIN — IPRATROPIUM BROMIDE AND ALBUTEROL SULFATE 3 ML: 2.5; .5 SOLUTION RESPIRATORY (INHALATION) at 12:51

## 2022-06-06 RX ADMIN — MORPHINE SULFATE 4 MG: 4 INJECTION, SOLUTION INTRAMUSCULAR; INTRAVENOUS at 12:45

## 2022-06-06 RX ADMIN — ONDANSETRON 4 MG: 2 INJECTION INTRAMUSCULAR; INTRAVENOUS at 09:04

## 2022-06-06 RX ADMIN — RANOLAZINE 500 MG: 500 TABLET, FILM COATED, EXTENDED RELEASE ORAL at 12:00

## 2022-06-06 RX ADMIN — INSULIN DETEMIR 20 UNITS: 100 INJECTION, SOLUTION SUBCUTANEOUS at 21:52

## 2022-06-06 RX ADMIN — LORAZEPAM 1 MG: 2 INJECTION INTRAMUSCULAR; INTRAVENOUS at 13:58

## 2022-06-06 RX ADMIN — KIT FOR THE PREPARATION OF TECHNETIUM TC 99M ALBUMIN AGGREGATED 1 DOSE: 2.5 INJECTION, POWDER, FOR SOLUTION INTRAVENOUS at 14:35

## 2022-06-06 RX ADMIN — GABAPENTIN 400 MG: 400 CAPSULE ORAL at 12:00

## 2022-06-06 RX ADMIN — ONDANSETRON 4 MG: 2 INJECTION INTRAMUSCULAR; INTRAVENOUS at 21:52

## 2022-06-06 RX ADMIN — Medication 10 ML: at 21:42

## 2022-06-06 RX ADMIN — RANOLAZINE 500 MG: 500 TABLET, FILM COATED, EXTENDED RELEASE ORAL at 21:41

## 2022-06-06 RX ADMIN — LORAZEPAM 0.5 MG: 0.5 TABLET ORAL at 12:00

## 2022-06-06 RX ADMIN — FUROSEMIDE 20 MG: 20 TABLET ORAL at 12:00

## 2022-06-06 RX ADMIN — IPRATROPIUM BROMIDE AND ALBUTEROL SULFATE 3 ML: 2.5; .5 SOLUTION RESPIRATORY (INHALATION) at 20:03

## 2022-06-06 RX ADMIN — DOXYCYCLINE 100 MG: 100 CAPSULE ORAL at 09:05

## 2022-06-06 RX ADMIN — ATORVASTATIN CALCIUM 80 MG: 40 TABLET, FILM COATED ORAL at 12:00

## 2022-06-06 RX ADMIN — DOXYCYCLINE 100 MG: 100 CAPSULE ORAL at 21:41

## 2022-06-06 RX ADMIN — ENOXAPARIN SODIUM 40 MG: 40 INJECTION SUBCUTANEOUS at 10:30

## 2022-06-06 NOTE — OUTREACH NOTE
Medical Week 2 Survey    Flowsheet Row Responses   [facility] patient discharged from? [place]   Does the patient have one of the following disease processes/diagnoses(primary or secondary)? Other   Week 2 attempt successful? No   Revoke Readmitted          ANNIE CASTANEDA - Registered Nurse

## 2022-06-06 NOTE — OUTREACH NOTE
Call Center TCM Note    Flowsheet Row Responses   [facility] patient discharged from? [place]   Does the patient have one of the following disease processes/diagnoses(primary or secondary)? Other   TCM attempt successful? No   Unsuccessful attempts Attempt 1   Change in Health Status Readmitted          Rebeca Ibanez RN    6/6/2022, 06:32 CDT

## 2022-06-07 DIAGNOSIS — M62.838 MUSCLE SPASM: ICD-10-CM

## 2022-06-07 LAB
ANION GAP SERPL CALCULATED.3IONS-SCNC: 16 MMOL/L (ref 5–15)
BUN SERPL-MCNC: 25 MG/DL (ref 8–23)
BUN/CREAT SERPL: 14 (ref 7–25)
CALCIUM SPEC-SCNC: 8.9 MG/DL (ref 8.6–10.5)
CHLORIDE SERPL-SCNC: 100 MMOL/L (ref 98–107)
CO2 SERPL-SCNC: 21 MMOL/L (ref 22–29)
CREAT SERPL-MCNC: 1.78 MG/DL (ref 0.57–1)
EGFRCR SERPLBLD CKD-EPI 2021: 32.4 ML/MIN/1.73
GLUCOSE BLDC GLUCOMTR-MCNC: 288 MG/DL (ref 70–130)
GLUCOSE BLDC GLUCOMTR-MCNC: 312 MG/DL (ref 70–130)
GLUCOSE BLDC GLUCOMTR-MCNC: 322 MG/DL (ref 70–130)
GLUCOSE SERPL-MCNC: 240 MG/DL (ref 65–99)
MAGNESIUM SERPL-MCNC: 1.7 MG/DL (ref 1.6–2.4)
POTASSIUM SERPL-SCNC: 5.2 MMOL/L (ref 3.5–5.2)
SODIUM SERPL-SCNC: 137 MMOL/L (ref 136–145)

## 2022-06-07 PROCEDURE — 25010000002 ENOXAPARIN PER 10 MG: Performed by: INTERNAL MEDICINE

## 2022-06-07 PROCEDURE — 25010000002 ONDANSETRON PER 1 MG: Performed by: INTERNAL MEDICINE

## 2022-06-07 PROCEDURE — 94664 DEMO&/EVAL PT USE INHALER: CPT

## 2022-06-07 PROCEDURE — 63710000001 INSULIN ASPART PER 5 UNITS: Performed by: INTERNAL MEDICINE

## 2022-06-07 PROCEDURE — 63710000001 INSULIN DETEMIR PER 5 UNITS: Performed by: INTERNAL MEDICINE

## 2022-06-07 PROCEDURE — 25010000002 MORPHINE PER 10 MG: Performed by: INTERNAL MEDICINE

## 2022-06-07 PROCEDURE — G0378 HOSPITAL OBSERVATION PER HR: HCPCS

## 2022-06-07 PROCEDURE — 99214 OFFICE O/P EST MOD 30 MIN: CPT | Performed by: INTERNAL MEDICINE

## 2022-06-07 PROCEDURE — 82962 GLUCOSE BLOOD TEST: CPT

## 2022-06-07 PROCEDURE — 94799 UNLISTED PULMONARY SVC/PX: CPT

## 2022-06-07 PROCEDURE — 83735 ASSAY OF MAGNESIUM: CPT | Performed by: INTERNAL MEDICINE

## 2022-06-07 PROCEDURE — 80048 BASIC METABOLIC PNL TOTAL CA: CPT | Performed by: INTERNAL MEDICINE

## 2022-06-07 PROCEDURE — 94760 N-INVAS EAR/PLS OXIMETRY 1: CPT

## 2022-06-07 RX ORDER — INSULIN ASPART 100 [IU]/ML
0-7 INJECTION, SOLUTION INTRAVENOUS; SUBCUTANEOUS
Status: DISCONTINUED | OUTPATIENT
Start: 2022-06-07 | End: 2022-06-17 | Stop reason: HOSPADM

## 2022-06-07 RX ORDER — DEXTROSE MONOHYDRATE 25 G/50ML
25 INJECTION, SOLUTION INTRAVENOUS
Status: DISCONTINUED | OUTPATIENT
Start: 2022-06-07 | End: 2022-06-17 | Stop reason: HOSPADM

## 2022-06-07 RX ORDER — SODIUM CHLORIDE 450 MG/100ML
50 INJECTION, SOLUTION INTRAVENOUS CONTINUOUS
Status: DISPENSED | OUTPATIENT
Start: 2022-06-07 | End: 2022-06-08

## 2022-06-07 RX ORDER — AMLODIPINE BESYLATE 2.5 MG/1
2.5 TABLET ORAL
Status: DISCONTINUED | OUTPATIENT
Start: 2022-06-07 | End: 2022-06-17 | Stop reason: HOSPADM

## 2022-06-07 RX ORDER — NICOTINE POLACRILEX 4 MG
15 LOZENGE BUCCAL
Status: DISCONTINUED | OUTPATIENT
Start: 2022-06-07 | End: 2022-06-17 | Stop reason: HOSPADM

## 2022-06-07 RX ORDER — METHOCARBAMOL 500 MG/1
TABLET, FILM COATED ORAL
Qty: 60 TABLET | Refills: 5 | Status: SHIPPED | OUTPATIENT
Start: 2022-06-07

## 2022-06-07 RX ADMIN — IPRATROPIUM BROMIDE AND ALBUTEROL SULFATE 3 ML: 2.5; .5 SOLUTION RESPIRATORY (INHALATION) at 19:29

## 2022-06-07 RX ADMIN — GABAPENTIN 400 MG: 400 CAPSULE ORAL at 08:40

## 2022-06-07 RX ADMIN — TRAMADOL HYDROCHLORIDE 50 MG: 50 TABLET, COATED ORAL at 08:50

## 2022-06-07 RX ADMIN — CLOPIDOGREL BISULFATE 75 MG: 75 TABLET ORAL at 08:40

## 2022-06-07 RX ADMIN — DOXYCYCLINE 100 MG: 100 CAPSULE ORAL at 20:15

## 2022-06-07 RX ADMIN — MIRTAZAPINE 30 MG: 15 TABLET, FILM COATED ORAL at 20:14

## 2022-06-07 RX ADMIN — Medication 10 ML: at 08:45

## 2022-06-07 RX ADMIN — DOXYCYCLINE 100 MG: 100 CAPSULE ORAL at 08:39

## 2022-06-07 RX ADMIN — Medication 10 ML: at 20:17

## 2022-06-07 RX ADMIN — ENOXAPARIN SODIUM 40 MG: 40 INJECTION SUBCUTANEOUS at 08:38

## 2022-06-07 RX ADMIN — INSULIN DETEMIR 20 UNITS: 100 INJECTION, SOLUTION SUBCUTANEOUS at 20:17

## 2022-06-07 RX ADMIN — MORPHINE SULFATE 4 MG: 4 INJECTION, SOLUTION INTRAMUSCULAR; INTRAVENOUS at 13:48

## 2022-06-07 RX ADMIN — MORPHINE SULFATE 4 MG: 4 INJECTION, SOLUTION INTRAMUSCULAR; INTRAVENOUS at 23:58

## 2022-06-07 RX ADMIN — Medication 10 ML: at 20:16

## 2022-06-07 RX ADMIN — LORAZEPAM 0.5 MG: 0.5 TABLET ORAL at 11:25

## 2022-06-07 RX ADMIN — FUROSEMIDE 20 MG: 20 TABLET ORAL at 08:41

## 2022-06-07 RX ADMIN — GABAPENTIN 400 MG: 400 CAPSULE ORAL at 16:43

## 2022-06-07 RX ADMIN — LISINOPRIL 20 MG: 20 TABLET ORAL at 08:41

## 2022-06-07 RX ADMIN — FOLIC ACID 1000 MCG: 1 TABLET ORAL at 08:38

## 2022-06-07 RX ADMIN — LORAZEPAM 0.5 MG: 0.5 TABLET ORAL at 18:11

## 2022-06-07 RX ADMIN — ARIPIPRAZOLE 15 MG: 15 TABLET ORAL at 08:39

## 2022-06-07 RX ADMIN — PANTOPRAZOLE SODIUM 40 MG: 40 TABLET, DELAYED RELEASE ORAL at 08:41

## 2022-06-07 RX ADMIN — GABAPENTIN 400 MG: 400 CAPSULE ORAL at 20:15

## 2022-06-07 RX ADMIN — ATORVASTATIN CALCIUM 80 MG: 40 TABLET, FILM COATED ORAL at 08:39

## 2022-06-07 RX ADMIN — MELATONIN 6 MG: 3 TAB ORAL at 23:57

## 2022-06-07 RX ADMIN — LORAZEPAM 0.5 MG: 0.5 TABLET ORAL at 03:01

## 2022-06-07 RX ADMIN — Medication 10 ML: at 08:41

## 2022-06-07 RX ADMIN — ISOSORBIDE MONONITRATE 30 MG: 30 TABLET, EXTENDED RELEASE ORAL at 08:39

## 2022-06-07 RX ADMIN — METOPROLOL SUCCINATE 50 MG: 50 TABLET, EXTENDED RELEASE ORAL at 08:40

## 2022-06-07 RX ADMIN — MORPHINE SULFATE 4 MG: 4 INJECTION, SOLUTION INTRAMUSCULAR; INTRAVENOUS at 20:19

## 2022-06-07 RX ADMIN — INSULIN ASPART 5 UNITS: 100 INJECTION, SOLUTION INTRAVENOUS; SUBCUTANEOUS at 16:49

## 2022-06-07 RX ADMIN — Medication 10 ML: at 08:44

## 2022-06-07 RX ADMIN — IPRATROPIUM BROMIDE AND ALBUTEROL SULFATE 3 ML: 2.5; .5 SOLUTION RESPIRATORY (INHALATION) at 08:05

## 2022-06-07 RX ADMIN — RANOLAZINE 500 MG: 500 TABLET, FILM COATED, EXTENDED RELEASE ORAL at 08:39

## 2022-06-07 RX ADMIN — ONDANSETRON 4 MG: 2 INJECTION INTRAMUSCULAR; INTRAVENOUS at 20:19

## 2022-06-07 RX ADMIN — MONTELUKAST 10 MG: 10 TABLET, FILM COATED ORAL at 20:15

## 2022-06-07 RX ADMIN — RANOLAZINE 500 MG: 500 TABLET, FILM COATED, EXTENDED RELEASE ORAL at 20:15

## 2022-06-07 RX ADMIN — ASPIRIN 325 MG: 325 TABLET, COATED ORAL at 08:38

## 2022-06-07 RX ADMIN — NITROGLYCERIN 0.4 MG: 0.4 TABLET SUBLINGUAL at 13:17

## 2022-06-07 RX ADMIN — ONDANSETRON 4 MG: 2 INJECTION INTRAMUSCULAR; INTRAVENOUS at 08:38

## 2022-06-07 RX ADMIN — AMLODIPINE BESYLATE 2.5 MG: 2.5 TABLET ORAL at 16:48

## 2022-06-07 RX ADMIN — SODIUM CHLORIDE 50 ML/HR: 4.5 INJECTION, SOLUTION INTRAVENOUS at 13:17

## 2022-06-08 LAB
ANION GAP SERPL CALCULATED.3IONS-SCNC: 11 MMOL/L (ref 5–15)
BUN SERPL-MCNC: 22 MG/DL (ref 8–23)
BUN/CREAT SERPL: 13.2 (ref 7–25)
CALCIUM SPEC-SCNC: 9.2 MG/DL (ref 8.6–10.5)
CHLORIDE SERPL-SCNC: 96 MMOL/L (ref 98–107)
CO2 SERPL-SCNC: 24 MMOL/L (ref 22–29)
CREAT SERPL-MCNC: 1.67 MG/DL (ref 0.57–1)
EGFRCR SERPLBLD CKD-EPI 2021: 34.9 ML/MIN/1.73
GLUCOSE BLDC GLUCOMTR-MCNC: 202 MG/DL (ref 70–130)
GLUCOSE BLDC GLUCOMTR-MCNC: 244 MG/DL (ref 70–130)
GLUCOSE BLDC GLUCOMTR-MCNC: 261 MG/DL (ref 70–130)
GLUCOSE BLDC GLUCOMTR-MCNC: 279 MG/DL (ref 70–130)
GLUCOSE SERPL-MCNC: 202 MG/DL (ref 65–99)
MAGNESIUM SERPL-MCNC: 1.6 MG/DL (ref 1.6–2.4)
POTASSIUM SERPL-SCNC: 4.6 MMOL/L (ref 3.5–5.2)
QT INTERVAL: 420 MS
QTC INTERVAL: 440 MS
SODIUM SERPL-SCNC: 131 MMOL/L (ref 136–145)

## 2022-06-08 PROCEDURE — 25010000002 ENOXAPARIN PER 10 MG: Performed by: INTERNAL MEDICINE

## 2022-06-08 PROCEDURE — 83735 ASSAY OF MAGNESIUM: CPT | Performed by: INTERNAL MEDICINE

## 2022-06-08 PROCEDURE — 80048 BASIC METABOLIC PNL TOTAL CA: CPT | Performed by: INTERNAL MEDICINE

## 2022-06-08 PROCEDURE — 99214 OFFICE O/P EST MOD 30 MIN: CPT | Performed by: INTERNAL MEDICINE

## 2022-06-08 PROCEDURE — 94664 DEMO&/EVAL PT USE INHALER: CPT

## 2022-06-08 PROCEDURE — 25010000002 MORPHINE PER 10 MG: Performed by: INTERNAL MEDICINE

## 2022-06-08 PROCEDURE — 25010000002 ONDANSETRON PER 1 MG: Performed by: INTERNAL MEDICINE

## 2022-06-08 PROCEDURE — 94799 UNLISTED PULMONARY SVC/PX: CPT

## 2022-06-08 PROCEDURE — 63710000001 INSULIN DETEMIR PER 5 UNITS: Performed by: INTERNAL MEDICINE

## 2022-06-08 PROCEDURE — 63710000001 INSULIN ASPART PER 5 UNITS: Performed by: INTERNAL MEDICINE

## 2022-06-08 PROCEDURE — G0378 HOSPITAL OBSERVATION PER HR: HCPCS

## 2022-06-08 PROCEDURE — 94760 N-INVAS EAR/PLS OXIMETRY 1: CPT

## 2022-06-08 PROCEDURE — 82962 GLUCOSE BLOOD TEST: CPT

## 2022-06-08 RX ADMIN — Medication 10 ML: at 20:07

## 2022-06-08 RX ADMIN — ISOSORBIDE MONONITRATE 30 MG: 30 TABLET, EXTENDED RELEASE ORAL at 08:21

## 2022-06-08 RX ADMIN — RANOLAZINE 500 MG: 500 TABLET, FILM COATED, EXTENDED RELEASE ORAL at 20:05

## 2022-06-08 RX ADMIN — ARIPIPRAZOLE 15 MG: 15 TABLET ORAL at 08:13

## 2022-06-08 RX ADMIN — INSULIN ASPART 4 UNITS: 100 INJECTION, SOLUTION INTRAVENOUS; SUBCUTANEOUS at 11:23

## 2022-06-08 RX ADMIN — MORPHINE SULFATE 4 MG: 4 INJECTION, SOLUTION INTRAMUSCULAR; INTRAVENOUS at 20:08

## 2022-06-08 RX ADMIN — RANOLAZINE 500 MG: 500 TABLET, FILM COATED, EXTENDED RELEASE ORAL at 08:13

## 2022-06-08 RX ADMIN — ONDANSETRON 4 MG: 2 INJECTION INTRAMUSCULAR; INTRAVENOUS at 08:25

## 2022-06-08 RX ADMIN — ASPIRIN 325 MG: 325 TABLET, COATED ORAL at 08:13

## 2022-06-08 RX ADMIN — PANTOPRAZOLE SODIUM 40 MG: 40 TABLET, DELAYED RELEASE ORAL at 08:13

## 2022-06-08 RX ADMIN — Medication 10 ML: at 08:17

## 2022-06-08 RX ADMIN — INSULIN ASPART 4 UNITS: 100 INJECTION, SOLUTION INTRAVENOUS; SUBCUTANEOUS at 16:41

## 2022-06-08 RX ADMIN — SODIUM CHLORIDE 50 ML/HR: 4.5 INJECTION, SOLUTION INTRAVENOUS at 02:10

## 2022-06-08 RX ADMIN — AMLODIPINE BESYLATE 2.5 MG: 2.5 TABLET ORAL at 08:21

## 2022-06-08 RX ADMIN — METOPROLOL SUCCINATE 50 MG: 50 TABLET, EXTENDED RELEASE ORAL at 08:21

## 2022-06-08 RX ADMIN — LORAZEPAM 0.5 MG: 0.5 TABLET ORAL at 18:33

## 2022-06-08 RX ADMIN — MECLIZINE HYDROCHLORIDE 25 MG: 25 TABLET ORAL at 12:35

## 2022-06-08 RX ADMIN — MONTELUKAST 10 MG: 10 TABLET, FILM COATED ORAL at 20:03

## 2022-06-08 RX ADMIN — MELATONIN 6 MG: 3 TAB ORAL at 20:03

## 2022-06-08 RX ADMIN — IPRATROPIUM BROMIDE AND ALBUTEROL SULFATE 3 ML: 2.5; .5 SOLUTION RESPIRATORY (INHALATION) at 07:34

## 2022-06-08 RX ADMIN — ONDANSETRON 4 MG: 2 INJECTION INTRAMUSCULAR; INTRAVENOUS at 20:08

## 2022-06-08 RX ADMIN — GABAPENTIN 400 MG: 400 CAPSULE ORAL at 20:05

## 2022-06-08 RX ADMIN — ENOXAPARIN SODIUM 40 MG: 40 INJECTION SUBCUTANEOUS at 08:12

## 2022-06-08 RX ADMIN — FOLIC ACID 1000 MCG: 1 TABLET ORAL at 08:13

## 2022-06-08 RX ADMIN — LORAZEPAM 0.5 MG: 0.5 TABLET ORAL at 11:23

## 2022-06-08 RX ADMIN — GABAPENTIN 400 MG: 400 CAPSULE ORAL at 16:41

## 2022-06-08 RX ADMIN — LORAZEPAM 0.5 MG: 0.5 TABLET ORAL at 02:06

## 2022-06-08 RX ADMIN — Medication 10 ML: at 08:16

## 2022-06-08 RX ADMIN — DOXYCYCLINE 100 MG: 100 CAPSULE ORAL at 08:14

## 2022-06-08 RX ADMIN — GABAPENTIN 400 MG: 400 CAPSULE ORAL at 08:13

## 2022-06-08 RX ADMIN — MIRTAZAPINE 30 MG: 15 TABLET, FILM COATED ORAL at 20:03

## 2022-06-08 RX ADMIN — INSULIN DETEMIR 20 UNITS: 100 INJECTION, SOLUTION SUBCUTANEOUS at 20:26

## 2022-06-08 RX ADMIN — ATORVASTATIN CALCIUM 80 MG: 40 TABLET, FILM COATED ORAL at 08:13

## 2022-06-08 RX ADMIN — DOXYCYCLINE 100 MG: 100 CAPSULE ORAL at 20:04

## 2022-06-08 RX ADMIN — ACETAMINOPHEN 650 MG: 325 TABLET ORAL at 18:33

## 2022-06-08 RX ADMIN — ONDANSETRON 4 MG: 2 INJECTION INTRAMUSCULAR; INTRAVENOUS at 13:57

## 2022-06-08 RX ADMIN — INSULIN ASPART 3 UNITS: 100 INJECTION, SOLUTION INTRAVENOUS; SUBCUTANEOUS at 08:14

## 2022-06-08 RX ADMIN — CLOPIDOGREL BISULFATE 75 MG: 75 TABLET ORAL at 08:13

## 2022-06-09 LAB
ANION GAP SERPL CALCULATED.3IONS-SCNC: 9 MMOL/L (ref 5–15)
BUN SERPL-MCNC: 19 MG/DL (ref 8–23)
BUN/CREAT SERPL: 13.1 (ref 7–25)
CALCIUM SPEC-SCNC: 9 MG/DL (ref 8.6–10.5)
CHLORIDE SERPL-SCNC: 100 MMOL/L (ref 98–107)
CO2 SERPL-SCNC: 25 MMOL/L (ref 22–29)
CREAT SERPL-MCNC: 1.45 MG/DL (ref 0.57–1)
EGFRCR SERPLBLD CKD-EPI 2021: 41.4 ML/MIN/1.73
GLUCOSE BLDC GLUCOMTR-MCNC: 177 MG/DL (ref 70–130)
GLUCOSE BLDC GLUCOMTR-MCNC: 287 MG/DL (ref 70–130)
GLUCOSE BLDC GLUCOMTR-MCNC: 287 MG/DL (ref 70–130)
GLUCOSE BLDC GLUCOMTR-MCNC: 382 MG/DL (ref 70–130)
GLUCOSE SERPL-MCNC: 173 MG/DL (ref 65–99)
MAGNESIUM SERPL-MCNC: 1.7 MG/DL (ref 1.6–2.4)
POTASSIUM SERPL-SCNC: 4.6 MMOL/L (ref 3.5–5.2)
QT INTERVAL: 420 MS
QTC INTERVAL: 446 MS
SODIUM SERPL-SCNC: 134 MMOL/L (ref 136–145)
WHOLE BLOOD HOLD SPECIMEN: NORMAL

## 2022-06-09 PROCEDURE — 82962 GLUCOSE BLOOD TEST: CPT

## 2022-06-09 PROCEDURE — 63710000001 INSULIN DETEMIR PER 5 UNITS: Performed by: INTERNAL MEDICINE

## 2022-06-09 PROCEDURE — 94799 UNLISTED PULMONARY SVC/PX: CPT

## 2022-06-09 PROCEDURE — 99232 SBSQ HOSP IP/OBS MODERATE 35: CPT | Performed by: INTERNAL MEDICINE

## 2022-06-09 PROCEDURE — 25010000002 ENOXAPARIN PER 10 MG: Performed by: INTERNAL MEDICINE

## 2022-06-09 PROCEDURE — 25010000002 ONDANSETRON PER 1 MG: Performed by: INTERNAL MEDICINE

## 2022-06-09 PROCEDURE — 25010000002 MORPHINE PER 10 MG: Performed by: INTERNAL MEDICINE

## 2022-06-09 PROCEDURE — 63710000001 INSULIN ASPART PER 5 UNITS: Performed by: INTERNAL MEDICINE

## 2022-06-09 PROCEDURE — 94664 DEMO&/EVAL PT USE INHALER: CPT

## 2022-06-09 PROCEDURE — 83735 ASSAY OF MAGNESIUM: CPT | Performed by: INTERNAL MEDICINE

## 2022-06-09 PROCEDURE — 94760 N-INVAS EAR/PLS OXIMETRY 1: CPT

## 2022-06-09 PROCEDURE — 36415 COLL VENOUS BLD VENIPUNCTURE: CPT | Performed by: INTERNAL MEDICINE

## 2022-06-09 PROCEDURE — 80048 BASIC METABOLIC PNL TOTAL CA: CPT | Performed by: INTERNAL MEDICINE

## 2022-06-09 RX ORDER — POLYETHYLENE GLYCOL 3350 17 G/17G
17 POWDER, FOR SOLUTION ORAL DAILY
Status: DISCONTINUED | OUTPATIENT
Start: 2022-06-09 | End: 2022-06-17 | Stop reason: HOSPADM

## 2022-06-09 RX ORDER — AMOXICILLIN 250 MG
1 CAPSULE ORAL NIGHTLY
Status: DISCONTINUED | OUTPATIENT
Start: 2022-06-09 | End: 2022-06-17 | Stop reason: HOSPADM

## 2022-06-09 RX ADMIN — DOXYCYCLINE 100 MG: 100 CAPSULE ORAL at 20:01

## 2022-06-09 RX ADMIN — INSULIN DETEMIR 20 UNITS: 100 INJECTION, SOLUTION SUBCUTANEOUS at 20:03

## 2022-06-09 RX ADMIN — ENOXAPARIN SODIUM 40 MG: 40 INJECTION SUBCUTANEOUS at 08:28

## 2022-06-09 RX ADMIN — INSULIN ASPART 4 UNITS: 100 INJECTION, SOLUTION INTRAVENOUS; SUBCUTANEOUS at 16:32

## 2022-06-09 RX ADMIN — MONTELUKAST 10 MG: 10 TABLET, FILM COATED ORAL at 20:01

## 2022-06-09 RX ADMIN — Medication 10 ML: at 20:02

## 2022-06-09 RX ADMIN — RANOLAZINE 500 MG: 500 TABLET, FILM COATED, EXTENDED RELEASE ORAL at 20:01

## 2022-06-09 RX ADMIN — HYDROCODONE BITARTRATE AND ACETAMINOPHEN 1 TABLET: 7.5; 325 TABLET ORAL at 21:20

## 2022-06-09 RX ADMIN — BENZONATATE 100 MG: 100 CAPSULE ORAL at 21:20

## 2022-06-09 RX ADMIN — LORAZEPAM 0.5 MG: 0.5 TABLET ORAL at 17:53

## 2022-06-09 RX ADMIN — INSULIN ASPART 2 UNITS: 100 INJECTION, SOLUTION INTRAVENOUS; SUBCUTANEOUS at 08:30

## 2022-06-09 RX ADMIN — ONDANSETRON 4 MG: 2 INJECTION INTRAMUSCULAR; INTRAVENOUS at 16:32

## 2022-06-09 RX ADMIN — ONDANSETRON 4 MG: 2 INJECTION INTRAMUSCULAR; INTRAVENOUS at 22:59

## 2022-06-09 RX ADMIN — Medication 10 ML: at 08:31

## 2022-06-09 RX ADMIN — POLYETHYLENE GLYCOL 3350 17 G: 17 POWDER, FOR SOLUTION ORAL at 15:59

## 2022-06-09 RX ADMIN — ASPIRIN 325 MG: 325 TABLET, COATED ORAL at 08:30

## 2022-06-09 RX ADMIN — ARIPIPRAZOLE 15 MG: 15 TABLET ORAL at 08:28

## 2022-06-09 RX ADMIN — FOLIC ACID 1000 MCG: 1 TABLET ORAL at 08:30

## 2022-06-09 RX ADMIN — Medication 10 ML: at 20:01

## 2022-06-09 RX ADMIN — GABAPENTIN 400 MG: 400 CAPSULE ORAL at 20:01

## 2022-06-09 RX ADMIN — MIRTAZAPINE 30 MG: 15 TABLET, FILM COATED ORAL at 20:01

## 2022-06-09 RX ADMIN — Medication 10 ML: at 08:33

## 2022-06-09 RX ADMIN — GABAPENTIN 400 MG: 400 CAPSULE ORAL at 15:59

## 2022-06-09 RX ADMIN — ATORVASTATIN CALCIUM 80 MG: 40 TABLET, FILM COATED ORAL at 08:28

## 2022-06-09 RX ADMIN — PANTOPRAZOLE SODIUM 40 MG: 40 TABLET, DELAYED RELEASE ORAL at 08:29

## 2022-06-09 RX ADMIN — GABAPENTIN 400 MG: 400 CAPSULE ORAL at 08:30

## 2022-06-09 RX ADMIN — MORPHINE SULFATE 4 MG: 4 INJECTION, SOLUTION INTRAMUSCULAR; INTRAVENOUS at 20:02

## 2022-06-09 RX ADMIN — Medication 10 ML: at 08:32

## 2022-06-09 RX ADMIN — DOXYCYCLINE 100 MG: 100 CAPSULE ORAL at 08:28

## 2022-06-09 RX ADMIN — MELATONIN 6 MG: 3 TAB ORAL at 20:01

## 2022-06-09 RX ADMIN — HYDROCODONE BITARTRATE AND ACETAMINOPHEN 1 TABLET: 7.5; 325 TABLET ORAL at 12:26

## 2022-06-09 RX ADMIN — ONDANSETRON 4 MG: 2 INJECTION INTRAMUSCULAR; INTRAVENOUS at 08:31

## 2022-06-09 RX ADMIN — CLOPIDOGREL BISULFATE 75 MG: 75 TABLET ORAL at 08:28

## 2022-06-09 RX ADMIN — ONDANSETRON 4 MG: 2 INJECTION INTRAMUSCULAR; INTRAVENOUS at 01:51

## 2022-06-09 RX ADMIN — MORPHINE SULFATE 4 MG: 4 INJECTION, SOLUTION INTRAMUSCULAR; INTRAVENOUS at 05:58

## 2022-06-09 RX ADMIN — INSULIN ASPART 6 UNITS: 100 INJECTION, SOLUTION INTRAVENOUS; SUBCUTANEOUS at 11:03

## 2022-06-09 RX ADMIN — MORPHINE SULFATE 4 MG: 4 INJECTION, SOLUTION INTRAMUSCULAR; INTRAVENOUS at 14:37

## 2022-06-09 RX ADMIN — RANOLAZINE 500 MG: 500 TABLET, FILM COATED, EXTENDED RELEASE ORAL at 08:30

## 2022-06-09 RX ADMIN — IPRATROPIUM BROMIDE AND ALBUTEROL SULFATE 3 ML: 2.5; .5 SOLUTION RESPIRATORY (INHALATION) at 13:55

## 2022-06-09 RX ADMIN — MORPHINE SULFATE 4 MG: 4 INJECTION, SOLUTION INTRAMUSCULAR; INTRAVENOUS at 01:51

## 2022-06-09 RX ADMIN — LORAZEPAM 0.5 MG: 0.5 TABLET ORAL at 01:49

## 2022-06-09 RX ADMIN — IPRATROPIUM BROMIDE AND ALBUTEROL SULFATE 3 ML: 2.5; .5 SOLUTION RESPIRATORY (INHALATION) at 07:33

## 2022-06-10 ENCOUNTER — ANESTHESIA (OUTPATIENT)
Dept: GASTROENTEROLOGY | Facility: HOSPITAL | Age: 62
End: 2022-06-10

## 2022-06-10 ENCOUNTER — ANESTHESIA EVENT (OUTPATIENT)
Dept: GASTROENTEROLOGY | Facility: HOSPITAL | Age: 62
End: 2022-06-10

## 2022-06-10 LAB
GLUCOSE BLDC GLUCOMTR-MCNC: 223 MG/DL (ref 70–130)
GLUCOSE BLDC GLUCOMTR-MCNC: 249 MG/DL (ref 70–130)

## 2022-06-10 PROCEDURE — 94799 UNLISTED PULMONARY SVC/PX: CPT

## 2022-06-10 PROCEDURE — 25010000002 PROPOFOL 10 MG/ML EMULSION

## 2022-06-10 PROCEDURE — 0DB68ZX EXCISION OF STOMACH, VIA NATURAL OR ARTIFICIAL OPENING ENDOSCOPIC, DIAGNOSTIC: ICD-10-PCS | Performed by: INTERNAL MEDICINE

## 2022-06-10 PROCEDURE — 63710000001 INSULIN ASPART PER 5 UNITS: Performed by: INTERNAL MEDICINE

## 2022-06-10 PROCEDURE — 25010000002 ENOXAPARIN PER 10 MG: Performed by: INTERNAL MEDICINE

## 2022-06-10 PROCEDURE — 82962 GLUCOSE BLOOD TEST: CPT

## 2022-06-10 PROCEDURE — 43239 EGD BIOPSY SINGLE/MULTIPLE: CPT | Performed by: INTERNAL MEDICINE

## 2022-06-10 PROCEDURE — 88305 TISSUE EXAM BY PATHOLOGIST: CPT

## 2022-06-10 PROCEDURE — 25010000002 ONDANSETRON PER 1 MG: Performed by: INTERNAL MEDICINE

## 2022-06-10 PROCEDURE — 63710000001 INSULIN DETEMIR PER 5 UNITS: Performed by: INTERNAL MEDICINE

## 2022-06-10 PROCEDURE — 94760 N-INVAS EAR/PLS OXIMETRY 1: CPT

## 2022-06-10 PROCEDURE — 0DB48ZX EXCISION OF ESOPHAGOGASTRIC JUNCTION, VIA NATURAL OR ARTIFICIAL OPENING ENDOSCOPIC, DIAGNOSTIC: ICD-10-PCS | Performed by: INTERNAL MEDICINE

## 2022-06-10 PROCEDURE — 25010000002 MORPHINE PER 10 MG: Performed by: INTERNAL MEDICINE

## 2022-06-10 RX ORDER — PROPOFOL 10 MG/ML
VIAL (ML) INTRAVENOUS AS NEEDED
Status: DISCONTINUED | OUTPATIENT
Start: 2022-06-10 | End: 2022-06-10 | Stop reason: SURG

## 2022-06-10 RX ORDER — SODIUM CHLORIDE 9 MG/ML
75 INJECTION, SOLUTION INTRAVENOUS CONTINUOUS
Status: DISCONTINUED | OUTPATIENT
Start: 2022-06-10 | End: 2022-06-15

## 2022-06-10 RX ORDER — LIDOCAINE HYDROCHLORIDE 20 MG/ML
INJECTION, SOLUTION INTRAVENOUS AS NEEDED
Status: DISCONTINUED | OUTPATIENT
Start: 2022-06-10 | End: 2022-06-10 | Stop reason: SURG

## 2022-06-10 RX ORDER — DEXTROSE AND SODIUM CHLORIDE 5; .45 G/100ML; G/100ML
30 INJECTION, SOLUTION INTRAVENOUS CONTINUOUS PRN
Status: DISCONTINUED | OUTPATIENT
Start: 2022-06-10 | End: 2022-06-17 | Stop reason: HOSPADM

## 2022-06-10 RX ADMIN — Medication 10 ML: at 20:16

## 2022-06-10 RX ADMIN — MIRTAZAPINE 30 MG: 15 TABLET, FILM COATED ORAL at 20:14

## 2022-06-10 RX ADMIN — LORAZEPAM 0.5 MG: 0.5 TABLET ORAL at 01:45

## 2022-06-10 RX ADMIN — BENZONATATE 100 MG: 100 CAPSULE ORAL at 20:14

## 2022-06-10 RX ADMIN — IPRATROPIUM BROMIDE AND ALBUTEROL SULFATE 3 ML: 2.5; .5 SOLUTION RESPIRATORY (INHALATION) at 07:11

## 2022-06-10 RX ADMIN — ISOSORBIDE MONONITRATE 30 MG: 30 TABLET, EXTENDED RELEASE ORAL at 08:30

## 2022-06-10 RX ADMIN — ASPIRIN 325 MG: 325 TABLET, COATED ORAL at 08:30

## 2022-06-10 RX ADMIN — FOLIC ACID 1000 MCG: 1 TABLET ORAL at 08:31

## 2022-06-10 RX ADMIN — RANOLAZINE 500 MG: 500 TABLET, FILM COATED, EXTENDED RELEASE ORAL at 20:14

## 2022-06-10 RX ADMIN — GABAPENTIN 400 MG: 400 CAPSULE ORAL at 20:14

## 2022-06-10 RX ADMIN — INSULIN DETEMIR 20 UNITS: 100 INJECTION, SOLUTION SUBCUTANEOUS at 20:15

## 2022-06-10 RX ADMIN — Medication 10 ML: at 08:29

## 2022-06-10 RX ADMIN — GABAPENTIN 400 MG: 400 CAPSULE ORAL at 08:30

## 2022-06-10 RX ADMIN — ATORVASTATIN CALCIUM 80 MG: 40 TABLET, FILM COATED ORAL at 08:30

## 2022-06-10 RX ADMIN — PROPOFOL 90 MG: 10 INJECTION, EMULSION INTRAVENOUS at 12:40

## 2022-06-10 RX ADMIN — PROPOFOL 60 MG: 10 INJECTION, EMULSION INTRAVENOUS at 12:39

## 2022-06-10 RX ADMIN — ARIPIPRAZOLE 15 MG: 15 TABLET ORAL at 08:30

## 2022-06-10 RX ADMIN — MORPHINE SULFATE 4 MG: 4 INJECTION, SOLUTION INTRAMUSCULAR; INTRAVENOUS at 14:39

## 2022-06-10 RX ADMIN — ENOXAPARIN SODIUM 40 MG: 40 INJECTION SUBCUTANEOUS at 08:31

## 2022-06-10 RX ADMIN — LORAZEPAM 0.5 MG: 0.5 TABLET ORAL at 10:49

## 2022-06-10 RX ADMIN — CLOPIDOGREL BISULFATE 75 MG: 75 TABLET ORAL at 08:31

## 2022-06-10 RX ADMIN — INSULIN ASPART 4 UNITS: 100 INJECTION, SOLUTION INTRAVENOUS; SUBCUTANEOUS at 17:23

## 2022-06-10 RX ADMIN — DOXYCYCLINE 100 MG: 100 CAPSULE ORAL at 08:30

## 2022-06-10 RX ADMIN — ONDANSETRON 4 MG: 2 INJECTION INTRAMUSCULAR; INTRAVENOUS at 08:35

## 2022-06-10 RX ADMIN — INSULIN ASPART 3 UNITS: 100 INJECTION, SOLUTION INTRAVENOUS; SUBCUTANEOUS at 08:31

## 2022-06-10 RX ADMIN — METOPROLOL SUCCINATE 50 MG: 50 TABLET, EXTENDED RELEASE ORAL at 08:30

## 2022-06-10 RX ADMIN — MORPHINE SULFATE 4 MG: 4 INJECTION, SOLUTION INTRAMUSCULAR; INTRAVENOUS at 08:35

## 2022-06-10 RX ADMIN — MORPHINE SULFATE 4 MG: 4 INJECTION, SOLUTION INTRAMUSCULAR; INTRAVENOUS at 00:06

## 2022-06-10 RX ADMIN — LORAZEPAM 0.5 MG: 0.5 TABLET ORAL at 17:46

## 2022-06-10 RX ADMIN — PANTOPRAZOLE SODIUM 40 MG: 40 TABLET, DELAYED RELEASE ORAL at 08:30

## 2022-06-10 RX ADMIN — MORPHINE SULFATE 4 MG: 4 INJECTION, SOLUTION INTRAMUSCULAR; INTRAVENOUS at 20:15

## 2022-06-10 RX ADMIN — GABAPENTIN 400 MG: 400 CAPSULE ORAL at 17:25

## 2022-06-10 RX ADMIN — MONTELUKAST 10 MG: 10 TABLET, FILM COATED ORAL at 20:14

## 2022-06-10 RX ADMIN — LIDOCAINE HYDROCHLORIDE 40 MG: 20 INJECTION, SOLUTION INTRAVENOUS at 12:39

## 2022-06-10 RX ADMIN — ONDANSETRON 4 MG: 2 INJECTION INTRAMUSCULAR; INTRAVENOUS at 14:39

## 2022-06-10 RX ADMIN — RANOLAZINE 500 MG: 500 TABLET, FILM COATED, EXTENDED RELEASE ORAL at 08:30

## 2022-06-10 RX ADMIN — SODIUM CHLORIDE 50 ML/HR: 9 INJECTION, SOLUTION INTRAVENOUS at 12:01

## 2022-06-10 RX ADMIN — ONDANSETRON 4 MG: 2 INJECTION INTRAMUSCULAR; INTRAVENOUS at 20:15

## 2022-06-10 RX ADMIN — AMLODIPINE BESYLATE 2.5 MG: 2.5 TABLET ORAL at 08:30

## 2022-06-10 RX ADMIN — MELATONIN 6 MG: 3 TAB ORAL at 20:14

## 2022-06-10 RX ADMIN — DOXYCYCLINE 100 MG: 100 CAPSULE ORAL at 20:14

## 2022-06-10 NOTE — ANESTHESIA PREPROCEDURE EVALUATION
Anesthesia Evaluation     Patient summary reviewed and Nursing notes reviewed   no history of anesthetic complications:  NPO Solid Status: > 8 hours  NPO Liquid Status: > 4 hours           Airway   Mallampati: II  TM distance: >3 FB  Neck ROM: full  Possible difficult intubation and Large neck circumference  Comment: General anesthesia 10/16.19, Gamboa #2, ETT 7.5, Grade 1. General anesthesia 2/26/21, MAC #3, ETT 7.0, Grade IIa.  Dental    (+) upper dentures and lower dentures    Pulmonary - negative pulmonary ROS    breath sounds clear to auscultation  (+) decreased breath sounds,   (-) COPD, asthma, sleep apnea, not a smoker    ROS comment: snoresFINDINGS: Low lung volumes.  No overt pulmonary vascular congestion, focal pulmonary  parenchymal opacity, pleural effusion or pneumothorax. Cardiac  silhouette is normal in size. Thoracic aorta contains  atherosclerotic calcification. Sternal suture wires appear stable     IMPRESSION:  CONCLUSION:  No acute pulmonary disease.     Electronically signed by:  Vamshi Rose MD  2/16/2020 10:07 AM  CST Workstation: [id number]  Cardiovascular - normal exam  Exercise tolerance: good (4-7 METS)    ECG reviewed  PT is on anticoagulation therapy  Patient on routine beta blocker and Beta blocker given within 24 hours of surgery  Rhythm: regular  Rate: normal    (+) hypertension poorly controlled 2 medications or greater, CAD, CABG >6 Months, cardiac stents more than 12 months ago angina with exertion, CHF , hyperlipidemia,  carotid artery disease  (-) valvular problems/murmurs, dysrhythmias, murmur, PVD, DVT    ROS comment: Rhythm: sinus rhythm  Rate: normal  BPM: 72  ST Segments: ST segments normal  Clinical impression: normal ECG  Interpreted by [place]      · Left Ventricle: Estimated EF appears to be in the range of 51 - 55%. Normal left ventricular cavity size noted  · There is moderate eccentric hypertrophy of the left ventricular cavity. Left ventricular diastolic  dysfunction is noted (grade II w/high LAP) consistent with pseudonormalization.  · Right Ventricle: Normal right ventricular wall thickness, systolic function and septal motion noted. Right ventricular cavity is borderline dilated  · No evidence of a patent foramen ovale. No evidence of an atrial septal defect present. Saline test results are negative.  · The aortic valve is abnormal with mild calcification of the right coronary cusp.  · Mitral Valve: The mitral valve is abnormal in structure. There is anterior leaflet thickening present. Mild mitral valve regurgitation is present.  · Trace to mild tricuspid valve regurgitation is present. Estimated right ventricular systolic pressure from tricuspid regurgitation is moderately elevated (45-55 mmHg)  · Mild pulmonary hypertension is present.  · There is no evidence of pericardial effusion.   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  The LAD is total from the midportion on.  The only flow given to the LAD region which also would include the apex in the inferoapical portion on a wraparound LAD is in the proximal LAD going into the diagonal branch.  This is been successfully stented with drug-eluting stents x2.  She has flow into the circumflex and right coronary artery and now except for the mid LAD portion is completely revascularized once again.    Neuro/Psych  (+) numbness (both feet), psychiatric history Anxiety and Depression,    (-) seizures, TIA, CVA, headaches  GI/Hepatic/Renal/Endo    (+) morbid obesity, GERD well controlled,  liver disease history of elevated LFT, diabetes mellitus type 2 poorly controlled using insulin, thyroid problem hypothyroidism  (-) hepatitis, no renal disease    ROS Comment: HGB 10.2 HCT 30.6    Musculoskeletal          ROS comment: Left foot  Abdominal   (+) obese,    Substance History - negative use     OB/GYN negative ob/gyn ROS         Other   arthritis,      (-) history of cancer                    Anesthesia Plan    ASA 4     MAC     intravenous induction     Anesthetic plan, risks, benefits, and alternatives have been provided, discussed and informed consent has been obtained with: patient.

## 2022-06-10 NOTE — ANESTHESIA POSTPROCEDURE EVALUATION
Patient: Ariana Martinez    Procedure Summary     Date: 06/10/22 Room / Location: [facility] ENDOSCOPY 1 / [facility] ENDOSCOPY    Anesthesia Start: 1238 Anesthesia Stop: 1247    Procedure: ESOPHAGOGASTRODUODENOSCOPY   room 380 (N/A ) Diagnosis:       Chest pain, unspecified type      (Chest pain, unspecified type [R07.9])    Surgeons: Jeremiah Wilkins MD Provider: Matias Valadez CRNA    Anesthesia Type: MAC ASA Status: 4          Anesthesia Type: MAC    Vitals  No vitals data found for the desired time range.          Post Anesthesia Care and Evaluation    Patient location during evaluation: PHASE II  Patient participation: complete - patient cannot participate  Level of consciousness: sleepy but conscious  Pain score: 0  Pain management: adequate    Airway patency: patent  Anesthetic complications: No anesthetic complications  PONV Status: none  Cardiovascular status: acceptable  Respiratory status: acceptable and room air  Hydration status: acceptable  No anesthesia care post op

## 2022-06-11 LAB
BILIRUB UR QL STRIP: NEGATIVE
CLARITY UR: CLEAR
COLOR UR: YELLOW
GLUCOSE BLDC GLUCOMTR-MCNC: 182 MG/DL (ref 70–130)
GLUCOSE BLDC GLUCOMTR-MCNC: 206 MG/DL (ref 70–130)
GLUCOSE BLDC GLUCOMTR-MCNC: 212 MG/DL (ref 70–130)
GLUCOSE BLDC GLUCOMTR-MCNC: 217 MG/DL (ref 70–130)
GLUCOSE BLDC GLUCOMTR-MCNC: 219 MG/DL (ref 70–130)
GLUCOSE BLDC GLUCOMTR-MCNC: 275 MG/DL (ref 70–130)
GLUCOSE UR STRIP-MCNC: NEGATIVE MG/DL
HGB UR QL STRIP.AUTO: NEGATIVE
KETONES UR QL STRIP: NEGATIVE
LEUKOCYTE ESTERASE UR QL STRIP.AUTO: NEGATIVE
NITRITE UR QL STRIP: NEGATIVE
PH UR STRIP.AUTO: <=5 [PH] (ref 5–9)
PROT UR QL STRIP: NEGATIVE
SP GR UR STRIP: 1.01 (ref 1–1.03)
UROBILINOGEN UR QL STRIP: NORMAL

## 2022-06-11 PROCEDURE — 81003 URINALYSIS AUTO W/O SCOPE: CPT | Performed by: INTERNAL MEDICINE

## 2022-06-11 PROCEDURE — 94799 UNLISTED PULMONARY SVC/PX: CPT

## 2022-06-11 PROCEDURE — 82962 GLUCOSE BLOOD TEST: CPT

## 2022-06-11 PROCEDURE — 25010000002 ENOXAPARIN PER 10 MG: Performed by: INTERNAL MEDICINE

## 2022-06-11 PROCEDURE — 63710000001 INSULIN ASPART PER 5 UNITS: Performed by: INTERNAL MEDICINE

## 2022-06-11 PROCEDURE — 63710000001 INSULIN DETEMIR PER 5 UNITS: Performed by: INTERNAL MEDICINE

## 2022-06-11 PROCEDURE — 99232 SBSQ HOSP IP/OBS MODERATE 35: CPT | Performed by: INTERNAL MEDICINE

## 2022-06-11 PROCEDURE — 25010000002 ONDANSETRON PER 1 MG: Performed by: INTERNAL MEDICINE

## 2022-06-11 RX ORDER — AMLODIPINE BESYLATE 2.5 MG/1
2.5 TABLET ORAL
Qty: 30 TABLET | Refills: 2 | Status: SHIPPED | OUTPATIENT
Start: 2022-06-12 | End: 2022-06-27 | Stop reason: DRUGHIGH

## 2022-06-11 RX ORDER — LACTULOSE 10 G/15ML
20 SOLUTION ORAL ONCE
Status: COMPLETED | OUTPATIENT
Start: 2022-06-11 | End: 2022-06-11

## 2022-06-11 RX ADMIN — LACTULOSE 20 G: 20 SOLUTION ORAL at 10:09

## 2022-06-11 RX ADMIN — ATORVASTATIN CALCIUM 80 MG: 40 TABLET, FILM COATED ORAL at 08:31

## 2022-06-11 RX ADMIN — IPRATROPIUM BROMIDE AND ALBUTEROL SULFATE 3 ML: 2.5; .5 SOLUTION RESPIRATORY (INHALATION) at 06:49

## 2022-06-11 RX ADMIN — FOLIC ACID 1000 MCG: 1 TABLET ORAL at 08:31

## 2022-06-11 RX ADMIN — MELATONIN 6 MG: 3 TAB ORAL at 20:19

## 2022-06-11 RX ADMIN — METOPROLOL SUCCINATE 50 MG: 50 TABLET, EXTENDED RELEASE ORAL at 08:31

## 2022-06-11 RX ADMIN — INSULIN DETEMIR 20 UNITS: 100 INJECTION, SOLUTION SUBCUTANEOUS at 20:18

## 2022-06-11 RX ADMIN — Medication 10 ML: at 08:41

## 2022-06-11 RX ADMIN — ONDANSETRON 4 MG: 2 INJECTION INTRAMUSCULAR; INTRAVENOUS at 14:48

## 2022-06-11 RX ADMIN — INSULIN ASPART 3 UNITS: 100 INJECTION, SOLUTION INTRAVENOUS; SUBCUTANEOUS at 17:21

## 2022-06-11 RX ADMIN — INSULIN ASPART 3 UNITS: 100 INJECTION, SOLUTION INTRAVENOUS; SUBCUTANEOUS at 11:10

## 2022-06-11 RX ADMIN — PANTOPRAZOLE SODIUM 40 MG: 40 TABLET, DELAYED RELEASE ORAL at 08:31

## 2022-06-11 RX ADMIN — ONDANSETRON 4 MG: 2 INJECTION INTRAMUSCULAR; INTRAVENOUS at 08:36

## 2022-06-11 RX ADMIN — MONTELUKAST 10 MG: 10 TABLET, FILM COATED ORAL at 20:18

## 2022-06-11 RX ADMIN — RANOLAZINE 500 MG: 500 TABLET, FILM COATED, EXTENDED RELEASE ORAL at 20:18

## 2022-06-11 RX ADMIN — GABAPENTIN 400 MG: 400 CAPSULE ORAL at 16:15

## 2022-06-11 RX ADMIN — IPRATROPIUM BROMIDE AND ALBUTEROL SULFATE 3 ML: 2.5; .5 SOLUTION RESPIRATORY (INHALATION) at 13:45

## 2022-06-11 RX ADMIN — HYDROCODONE BITARTRATE AND ACETAMINOPHEN 1 TABLET: 7.5; 325 TABLET ORAL at 01:45

## 2022-06-11 RX ADMIN — Medication 10 ML: at 21:55

## 2022-06-11 RX ADMIN — ARIPIPRAZOLE 15 MG: 15 TABLET ORAL at 08:31

## 2022-06-11 RX ADMIN — IPRATROPIUM BROMIDE AND ALBUTEROL SULFATE 3 ML: 2.5; .5 SOLUTION RESPIRATORY (INHALATION) at 19:51

## 2022-06-11 RX ADMIN — INSULIN ASPART 2 UNITS: 100 INJECTION, SOLUTION INTRAVENOUS; SUBCUTANEOUS at 08:31

## 2022-06-11 RX ADMIN — ENOXAPARIN SODIUM 40 MG: 40 INJECTION SUBCUTANEOUS at 08:31

## 2022-06-11 RX ADMIN — HYDROCODONE BITARTRATE AND ACETAMINOPHEN 1 TABLET: 7.5; 325 TABLET ORAL at 20:18

## 2022-06-11 RX ADMIN — ISOSORBIDE MONONITRATE 30 MG: 30 TABLET, EXTENDED RELEASE ORAL at 08:31

## 2022-06-11 RX ADMIN — GABAPENTIN 400 MG: 400 CAPSULE ORAL at 08:31

## 2022-06-11 RX ADMIN — AMLODIPINE BESYLATE 2.5 MG: 2.5 TABLET ORAL at 08:31

## 2022-06-11 RX ADMIN — Medication 10 ML: at 21:54

## 2022-06-11 RX ADMIN — ASPIRIN 325 MG: 325 TABLET, COATED ORAL at 08:31

## 2022-06-11 RX ADMIN — CLOPIDOGREL BISULFATE 75 MG: 75 TABLET ORAL at 08:31

## 2022-06-11 RX ADMIN — MIRTAZAPINE 30 MG: 15 TABLET, FILM COATED ORAL at 20:19

## 2022-06-11 RX ADMIN — LORAZEPAM 0.5 MG: 0.5 TABLET ORAL at 01:45

## 2022-06-11 RX ADMIN — RANOLAZINE 500 MG: 500 TABLET, FILM COATED, EXTENDED RELEASE ORAL at 08:30

## 2022-06-11 RX ADMIN — GABAPENTIN 400 MG: 400 CAPSULE ORAL at 20:18

## 2022-06-12 LAB
GLUCOSE BLDC GLUCOMTR-MCNC: 240 MG/DL (ref 70–130)
GLUCOSE BLDC GLUCOMTR-MCNC: 274 MG/DL (ref 70–130)
GLUCOSE BLDC GLUCOMTR-MCNC: 297 MG/DL (ref 70–130)
GLUCOSE BLDC GLUCOMTR-MCNC: 305 MG/DL (ref 70–130)

## 2022-06-12 PROCEDURE — 25010000002 ZIPRASIDONE MESYLATE PER 10 MG: Performed by: INTERNAL MEDICINE

## 2022-06-12 PROCEDURE — 63710000001 INSULIN ASPART PER 5 UNITS: Performed by: INTERNAL MEDICINE

## 2022-06-12 PROCEDURE — 25010000002 HALOPERIDOL LACTATE PER 5 MG: Performed by: INTERNAL MEDICINE

## 2022-06-12 PROCEDURE — 99232 SBSQ HOSP IP/OBS MODERATE 35: CPT | Performed by: INTERNAL MEDICINE

## 2022-06-12 PROCEDURE — 82962 GLUCOSE BLOOD TEST: CPT

## 2022-06-12 PROCEDURE — 25010000002 HALOPERIDOL LACTATE PER 5 MG

## 2022-06-12 PROCEDURE — 25010000002 ENOXAPARIN PER 10 MG: Performed by: INTERNAL MEDICINE

## 2022-06-12 PROCEDURE — 63710000001 INSULIN DETEMIR PER 5 UNITS: Performed by: INTERNAL MEDICINE

## 2022-06-12 PROCEDURE — 94799 UNLISTED PULMONARY SVC/PX: CPT

## 2022-06-12 RX ORDER — HALOPERIDOL 5 MG/ML
3 INJECTION INTRAMUSCULAR ONCE
Status: COMPLETED | OUTPATIENT
Start: 2022-06-12 | End: 2022-06-12

## 2022-06-12 RX ORDER — LANOLIN ALCOHOL/MO/W.PET/CERES
3 CREAM (GRAM) TOPICAL NIGHTLY
Status: DISCONTINUED | OUTPATIENT
Start: 2022-06-12 | End: 2022-06-17 | Stop reason: HOSPADM

## 2022-06-12 RX ORDER — ZIPRASIDONE MESYLATE 20 MG/ML
20 INJECTION, POWDER, LYOPHILIZED, FOR SOLUTION INTRAMUSCULAR ONCE
Status: COMPLETED | OUTPATIENT
Start: 2022-06-12 | End: 2022-06-12

## 2022-06-12 RX ORDER — HALOPERIDOL 5 MG/ML
INJECTION INTRAMUSCULAR
Status: COMPLETED
Start: 2022-06-12 | End: 2022-06-12

## 2022-06-12 RX ORDER — HALOPERIDOL 5 MG/ML
4 INJECTION INTRAMUSCULAR EVERY 6 HOURS PRN
Status: DISCONTINUED | OUTPATIENT
Start: 2022-06-12 | End: 2022-06-17 | Stop reason: HOSPADM

## 2022-06-12 RX ADMIN — ZIPRASIDONE MESYLATE 20 MG: 20 INJECTION, POWDER, LYOPHILIZED, FOR SOLUTION INTRAMUSCULAR at 19:50

## 2022-06-12 RX ADMIN — Medication 10 ML: at 08:35

## 2022-06-12 RX ADMIN — LORAZEPAM 0.5 MG: 0.5 TABLET ORAL at 10:07

## 2022-06-12 RX ADMIN — Medication 10 ML: at 22:17

## 2022-06-12 RX ADMIN — ASPIRIN 325 MG: 325 TABLET, COATED ORAL at 08:34

## 2022-06-12 RX ADMIN — INSULIN DETEMIR 20 UNITS: 100 INJECTION, SOLUTION SUBCUTANEOUS at 22:16

## 2022-06-12 RX ADMIN — ENOXAPARIN SODIUM 40 MG: 40 INJECTION SUBCUTANEOUS at 08:42

## 2022-06-12 RX ADMIN — INSULIN ASPART 3 UNITS: 100 INJECTION, SOLUTION INTRAVENOUS; SUBCUTANEOUS at 08:34

## 2022-06-12 RX ADMIN — Medication 10 ML: at 08:34

## 2022-06-12 RX ADMIN — Medication 10 ML: at 22:21

## 2022-06-12 RX ADMIN — INSULIN ASPART 4 UNITS: 100 INJECTION, SOLUTION INTRAVENOUS; SUBCUTANEOUS at 10:47

## 2022-06-12 RX ADMIN — IPRATROPIUM BROMIDE AND ALBUTEROL SULFATE 3 ML: 2.5; .5 SOLUTION RESPIRATORY (INHALATION) at 07:45

## 2022-06-12 RX ADMIN — Medication 10 ML: at 22:18

## 2022-06-12 RX ADMIN — PANTOPRAZOLE SODIUM 40 MG: 40 TABLET, DELAYED RELEASE ORAL at 08:33

## 2022-06-12 RX ADMIN — HALOPERIDOL LACTATE 4 MG: 5 INJECTION, SOLUTION INTRAMUSCULAR at 14:09

## 2022-06-12 RX ADMIN — INSULIN ASPART 4 UNITS: 100 INJECTION, SOLUTION INTRAVENOUS; SUBCUTANEOUS at 17:00

## 2022-06-12 RX ADMIN — GABAPENTIN 400 MG: 400 CAPSULE ORAL at 08:34

## 2022-06-12 RX ADMIN — ATORVASTATIN CALCIUM 80 MG: 40 TABLET, FILM COATED ORAL at 08:33

## 2022-06-12 RX ADMIN — HALOPERIDOL LACTATE 3 MG: 5 INJECTION, SOLUTION INTRAMUSCULAR at 02:45

## 2022-06-12 RX ADMIN — POLYETHYLENE GLYCOL 3350 17 G: 17 POWDER, FOR SOLUTION ORAL at 08:34

## 2022-06-12 RX ADMIN — AMLODIPINE BESYLATE 2.5 MG: 2.5 TABLET ORAL at 08:34

## 2022-06-12 RX ADMIN — METOPROLOL SUCCINATE 50 MG: 50 TABLET, EXTENDED RELEASE ORAL at 08:34

## 2022-06-12 RX ADMIN — GABAPENTIN 400 MG: 400 CAPSULE ORAL at 15:40

## 2022-06-12 RX ADMIN — RANOLAZINE 500 MG: 500 TABLET, FILM COATED, EXTENDED RELEASE ORAL at 08:34

## 2022-06-12 RX ADMIN — HALOPERIDOL 3 MG: 5 INJECTION INTRAMUSCULAR at 02:45

## 2022-06-12 RX ADMIN — FOLIC ACID 1000 MCG: 1 TABLET ORAL at 08:33

## 2022-06-12 RX ADMIN — CLOPIDOGREL BISULFATE 75 MG: 75 TABLET ORAL at 08:33

## 2022-06-12 RX ADMIN — ARIPIPRAZOLE 15 MG: 15 TABLET ORAL at 08:42

## 2022-06-12 RX ADMIN — ISOSORBIDE MONONITRATE 30 MG: 30 TABLET, EXTENDED RELEASE ORAL at 08:34

## 2022-06-13 ENCOUNTER — APPOINTMENT (OUTPATIENT)
Dept: NUCLEAR MEDICINE | Facility: HOSPITAL | Age: 62
End: 2022-06-13

## 2022-06-13 ENCOUNTER — APPOINTMENT (OUTPATIENT)
Dept: CT IMAGING | Facility: HOSPITAL | Age: 62
End: 2022-06-13

## 2022-06-13 ENCOUNTER — APPOINTMENT (OUTPATIENT)
Dept: GENERAL RADIOLOGY | Facility: HOSPITAL | Age: 62
End: 2022-06-13

## 2022-06-13 LAB
ANION GAP SERPL CALCULATED.3IONS-SCNC: 16 MMOL/L (ref 5–15)
BASOPHILS # BLD AUTO: 0.08 10*3/MM3 (ref 0–0.2)
BASOPHILS NFR BLD AUTO: 0.6 % (ref 0–1.5)
BUN SERPL-MCNC: 15 MG/DL (ref 8–23)
BUN/CREAT SERPL: 9.2 (ref 7–25)
CALCIUM SPEC-SCNC: 9.4 MG/DL (ref 8.6–10.5)
CHLORIDE SERPL-SCNC: 102 MMOL/L (ref 98–107)
CO2 SERPL-SCNC: 21 MMOL/L (ref 22–29)
CREAT SERPL-MCNC: 1.63 MG/DL (ref 0.57–1)
DEPRECATED RDW RBC AUTO: 43.8 FL (ref 37–54)
EGFRCR SERPLBLD CKD-EPI 2021: 36 ML/MIN/1.73
EOSINOPHIL # BLD AUTO: 0.14 10*3/MM3 (ref 0–0.4)
EOSINOPHIL NFR BLD AUTO: 1 % (ref 0.3–6.2)
ERYTHROCYTE [DISTWIDTH] IN BLOOD BY AUTOMATED COUNT: 13.7 % (ref 12.3–15.4)
GLUCOSE BLDC GLUCOMTR-MCNC: 219 MG/DL (ref 70–130)
GLUCOSE BLDC GLUCOMTR-MCNC: 268 MG/DL (ref 70–130)
GLUCOSE BLDC GLUCOMTR-MCNC: 285 MG/DL (ref 70–130)
GLUCOSE SERPL-MCNC: 294 MG/DL (ref 65–99)
HCT VFR BLD AUTO: 34.6 % (ref 34–46.6)
HGB BLD-MCNC: 12 G/DL (ref 12–15.9)
IMM GRANULOCYTES # BLD AUTO: 0.07 10*3/MM3 (ref 0–0.05)
IMM GRANULOCYTES NFR BLD AUTO: 0.5 % (ref 0–0.5)
LYMPHOCYTES # BLD AUTO: 2.22 10*3/MM3 (ref 0.7–3.1)
LYMPHOCYTES NFR BLD AUTO: 16.2 % (ref 19.6–45.3)
MCH RBC QN AUTO: 30.7 PG (ref 26.6–33)
MCHC RBC AUTO-ENTMCNC: 34.7 G/DL (ref 31.5–35.7)
MCV RBC AUTO: 88.5 FL (ref 79–97)
MONOCYTES # BLD AUTO: 1 10*3/MM3 (ref 0.1–0.9)
MONOCYTES NFR BLD AUTO: 7.3 % (ref 5–12)
NEUTROPHILS NFR BLD AUTO: 10.19 10*3/MM3 (ref 1.7–7)
NEUTROPHILS NFR BLD AUTO: 74.4 % (ref 42.7–76)
NRBC BLD AUTO-RTO: 0 /100 WBC (ref 0–0.2)
PLATELET # BLD AUTO: 352 10*3/MM3 (ref 140–450)
PMV BLD AUTO: 10.3 FL (ref 6–12)
POTASSIUM SERPL-SCNC: 4.2 MMOL/L (ref 3.5–5.2)
RBC # BLD AUTO: 3.91 10*6/MM3 (ref 3.77–5.28)
REF LAB TEST METHOD: NORMAL
SODIUM SERPL-SCNC: 139 MMOL/L (ref 136–145)
WBC NRBC COR # BLD: 13.7 10*3/MM3 (ref 3.4–10.8)

## 2022-06-13 PROCEDURE — 85025 COMPLETE CBC W/AUTO DIFF WBC: CPT | Performed by: INTERNAL MEDICINE

## 2022-06-13 PROCEDURE — 71045 X-RAY EXAM CHEST 1 VIEW: CPT

## 2022-06-13 PROCEDURE — 0 TECHNETIUM SULFUR COLLOID: Performed by: INTERNAL MEDICINE

## 2022-06-13 PROCEDURE — 63710000001 INSULIN ASPART PER 5 UNITS: Performed by: INTERNAL MEDICINE

## 2022-06-13 PROCEDURE — 70450 CT HEAD/BRAIN W/O DYE: CPT

## 2022-06-13 PROCEDURE — 25010000002 ENOXAPARIN PER 10 MG: Performed by: INTERNAL MEDICINE

## 2022-06-13 PROCEDURE — 99232 SBSQ HOSP IP/OBS MODERATE 35: CPT | Performed by: INTERNAL MEDICINE

## 2022-06-13 PROCEDURE — 94760 N-INVAS EAR/PLS OXIMETRY 1: CPT

## 2022-06-13 PROCEDURE — 94799 UNLISTED PULMONARY SVC/PX: CPT

## 2022-06-13 PROCEDURE — 82962 GLUCOSE BLOOD TEST: CPT

## 2022-06-13 PROCEDURE — 25010000002 ERYTHROMYCIN LACTOBIONATE PER 500 MG: Performed by: INTERNAL MEDICINE

## 2022-06-13 PROCEDURE — 99232 SBSQ HOSP IP/OBS MODERATE 35: CPT | Performed by: PSYCHIATRY & NEUROLOGY

## 2022-06-13 PROCEDURE — 80048 BASIC METABOLIC PNL TOTAL CA: CPT | Performed by: INTERNAL MEDICINE

## 2022-06-13 PROCEDURE — 63710000001 INSULIN DETEMIR PER 5 UNITS: Performed by: INTERNAL MEDICINE

## 2022-06-13 PROCEDURE — A9541 TC99M SULFUR COLLOID: HCPCS | Performed by: INTERNAL MEDICINE

## 2022-06-13 PROCEDURE — 78264 GASTRIC EMPTYING IMG STUDY: CPT

## 2022-06-13 RX ORDER — GABAPENTIN 100 MG/1
200 CAPSULE ORAL 3 TIMES DAILY
Status: DISCONTINUED | OUTPATIENT
Start: 2022-06-13 | End: 2022-06-15

## 2022-06-13 RX ORDER — ARIPIPRAZOLE 15 MG/1
7.5 TABLET ORAL DAILY
Status: DISCONTINUED | OUTPATIENT
Start: 2022-06-14 | End: 2022-06-16

## 2022-06-13 RX ADMIN — RANOLAZINE 500 MG: 500 TABLET, FILM COATED, EXTENDED RELEASE ORAL at 20:19

## 2022-06-13 RX ADMIN — MELATONIN 3 MG: 3 TAB ORAL at 20:19

## 2022-06-13 RX ADMIN — RANOLAZINE 500 MG: 500 TABLET, FILM COATED, EXTENDED RELEASE ORAL at 13:46

## 2022-06-13 RX ADMIN — INSULIN ASPART 4 UNITS: 100 INJECTION, SOLUTION INTRAVENOUS; SUBCUTANEOUS at 08:43

## 2022-06-13 RX ADMIN — Medication 10 ML: at 08:45

## 2022-06-13 RX ADMIN — ASPIRIN 325 MG: 325 TABLET, COATED ORAL at 13:45

## 2022-06-13 RX ADMIN — FOLIC ACID 1000 MCG: 1 TABLET ORAL at 13:45

## 2022-06-13 RX ADMIN — Medication 10 ML: at 20:20

## 2022-06-13 RX ADMIN — Medication 10 ML: at 20:21

## 2022-06-13 RX ADMIN — AMLODIPINE BESYLATE 2.5 MG: 2.5 TABLET ORAL at 13:46

## 2022-06-13 RX ADMIN — GABAPENTIN 400 MG: 400 CAPSULE ORAL at 15:14

## 2022-06-13 RX ADMIN — CLOPIDOGREL BISULFATE 75 MG: 75 TABLET ORAL at 13:45

## 2022-06-13 RX ADMIN — GABAPENTIN 200 MG: 100 CAPSULE ORAL at 20:30

## 2022-06-13 RX ADMIN — Medication 10 ML: at 08:44

## 2022-06-13 RX ADMIN — TECHNETIUM TC 99M SULFUR COLLOID 1 DOSE: KIT at 10:59

## 2022-06-13 RX ADMIN — ERYTHROMYCIN LACTOBIONATE 250 MG: 500 INJECTION, POWDER, LYOPHILIZED, FOR SOLUTION INTRAVENOUS at 20:18

## 2022-06-13 RX ADMIN — ISOSORBIDE MONONITRATE 30 MG: 30 TABLET, EXTENDED RELEASE ORAL at 13:45

## 2022-06-13 RX ADMIN — IPRATROPIUM BROMIDE AND ALBUTEROL SULFATE 3 ML: 2.5; .5 SOLUTION RESPIRATORY (INHALATION) at 19:13

## 2022-06-13 RX ADMIN — MONTELUKAST 10 MG: 10 TABLET, FILM COATED ORAL at 20:19

## 2022-06-13 RX ADMIN — SODIUM CHLORIDE 75 ML/HR: 9 INJECTION, SOLUTION INTRAVENOUS at 23:54

## 2022-06-13 RX ADMIN — PANTOPRAZOLE SODIUM 40 MG: 40 TABLET, DELAYED RELEASE ORAL at 13:44

## 2022-06-13 RX ADMIN — INSULIN DETEMIR 20 UNITS: 100 INJECTION, SOLUTION SUBCUTANEOUS at 20:30

## 2022-06-13 RX ADMIN — ARIPIPRAZOLE 15 MG: 15 TABLET ORAL at 13:45

## 2022-06-13 RX ADMIN — ENOXAPARIN SODIUM 40 MG: 40 INJECTION SUBCUTANEOUS at 09:17

## 2022-06-13 RX ADMIN — ATORVASTATIN CALCIUM 80 MG: 40 TABLET, FILM COATED ORAL at 13:45

## 2022-06-13 RX ADMIN — METOPROLOL SUCCINATE 50 MG: 50 TABLET, EXTENDED RELEASE ORAL at 13:45

## 2022-06-13 RX ADMIN — INSULIN ASPART 4 UNITS: 100 INJECTION, SOLUTION INTRAVENOUS; SUBCUTANEOUS at 16:40

## 2022-06-13 RX ADMIN — IPRATROPIUM BROMIDE AND ALBUTEROL SULFATE 3 ML: 2.5; .5 SOLUTION RESPIRATORY (INHALATION) at 06:18

## 2022-06-14 LAB
GLUCOSE BLDC GLUCOMTR-MCNC: 190 MG/DL (ref 70–130)
GLUCOSE BLDC GLUCOMTR-MCNC: 203 MG/DL (ref 70–130)
GLUCOSE BLDC GLUCOMTR-MCNC: 220 MG/DL (ref 70–130)
GLUCOSE BLDC GLUCOMTR-MCNC: 253 MG/DL (ref 70–130)

## 2022-06-14 PROCEDURE — 63710000001 INSULIN DETEMIR PER 5 UNITS: Performed by: INTERNAL MEDICINE

## 2022-06-14 PROCEDURE — 94664 DEMO&/EVAL PT USE INHALER: CPT

## 2022-06-14 PROCEDURE — 82962 GLUCOSE BLOOD TEST: CPT

## 2022-06-14 PROCEDURE — 25010000002 ENOXAPARIN PER 10 MG: Performed by: INTERNAL MEDICINE

## 2022-06-14 PROCEDURE — 94799 UNLISTED PULMONARY SVC/PX: CPT

## 2022-06-14 PROCEDURE — 25010000002 ERYTHROMYCIN LACTOBIONATE PER 500 MG: Performed by: INTERNAL MEDICINE

## 2022-06-14 PROCEDURE — 99232 SBSQ HOSP IP/OBS MODERATE 35: CPT | Performed by: INTERNAL MEDICINE

## 2022-06-14 PROCEDURE — 99232 SBSQ HOSP IP/OBS MODERATE 35: CPT | Performed by: PSYCHIATRY & NEUROLOGY

## 2022-06-14 PROCEDURE — 92610 EVALUATE SWALLOWING FUNCTION: CPT | Performed by: SPEECH-LANGUAGE PATHOLOGIST

## 2022-06-14 PROCEDURE — 63710000001 INSULIN ASPART PER 5 UNITS: Performed by: INTERNAL MEDICINE

## 2022-06-14 PROCEDURE — 94760 N-INVAS EAR/PLS OXIMETRY 1: CPT

## 2022-06-14 RX ADMIN — ISOSORBIDE MONONITRATE 30 MG: 30 TABLET, EXTENDED RELEASE ORAL at 08:54

## 2022-06-14 RX ADMIN — Medication 10 ML: at 20:09

## 2022-06-14 RX ADMIN — ATORVASTATIN CALCIUM 80 MG: 40 TABLET, FILM COATED ORAL at 08:54

## 2022-06-14 RX ADMIN — INSULIN ASPART 3 UNITS: 100 INJECTION, SOLUTION INTRAVENOUS; SUBCUTANEOUS at 12:39

## 2022-06-14 RX ADMIN — INSULIN ASPART 2 UNITS: 100 INJECTION, SOLUTION INTRAVENOUS; SUBCUTANEOUS at 08:55

## 2022-06-14 RX ADMIN — INSULIN ASPART 2 UNITS: 100 INJECTION, SOLUTION INTRAVENOUS; SUBCUTANEOUS at 17:02

## 2022-06-14 RX ADMIN — HYDROCODONE BITARTRATE AND ACETAMINOPHEN 1 TABLET: 7.5; 325 TABLET ORAL at 22:17

## 2022-06-14 RX ADMIN — GABAPENTIN 200 MG: 100 CAPSULE ORAL at 20:07

## 2022-06-14 RX ADMIN — ASPIRIN 325 MG: 325 TABLET, COATED ORAL at 09:05

## 2022-06-14 RX ADMIN — METOPROLOL SUCCINATE 50 MG: 50 TABLET, EXTENDED RELEASE ORAL at 08:54

## 2022-06-14 RX ADMIN — GABAPENTIN 200 MG: 100 CAPSULE ORAL at 17:02

## 2022-06-14 RX ADMIN — Medication 7.5 MG: at 08:54

## 2022-06-14 RX ADMIN — ERYTHROMYCIN LACTOBIONATE 250 MG: 500 INJECTION, POWDER, LYOPHILIZED, FOR SOLUTION INTRAVENOUS at 01:36

## 2022-06-14 RX ADMIN — ENOXAPARIN SODIUM 40 MG: 40 INJECTION SUBCUTANEOUS at 08:56

## 2022-06-14 RX ADMIN — ERYTHROMYCIN LACTOBIONATE 250 MG: 500 INJECTION, POWDER, LYOPHILIZED, FOR SOLUTION INTRAVENOUS at 20:08

## 2022-06-14 RX ADMIN — CLOPIDOGREL BISULFATE 75 MG: 75 TABLET ORAL at 08:54

## 2022-06-14 RX ADMIN — MONTELUKAST 10 MG: 10 TABLET, FILM COATED ORAL at 20:07

## 2022-06-14 RX ADMIN — INSULIN DETEMIR 20 UNITS: 100 INJECTION, SOLUTION SUBCUTANEOUS at 20:07

## 2022-06-14 RX ADMIN — Medication 10 ML: at 20:08

## 2022-06-14 RX ADMIN — SODIUM CHLORIDE 75 ML/HR: 9 INJECTION, SOLUTION INTRAVENOUS at 17:02

## 2022-06-14 RX ADMIN — PANTOPRAZOLE SODIUM 40 MG: 40 TABLET, DELAYED RELEASE ORAL at 08:54

## 2022-06-14 RX ADMIN — RANOLAZINE 500 MG: 500 TABLET, FILM COATED, EXTENDED RELEASE ORAL at 20:08

## 2022-06-14 RX ADMIN — IPRATROPIUM BROMIDE AND ALBUTEROL SULFATE 3 ML: 2.5; .5 SOLUTION RESPIRATORY (INHALATION) at 19:43

## 2022-06-14 RX ADMIN — GABAPENTIN 200 MG: 100 CAPSULE ORAL at 08:54

## 2022-06-14 RX ADMIN — ERYTHROMYCIN LACTOBIONATE 250 MG: 500 INJECTION, POWDER, LYOPHILIZED, FOR SOLUTION INTRAVENOUS at 14:48

## 2022-06-14 RX ADMIN — ERYTHROMYCIN LACTOBIONATE 250 MG: 500 INJECTION, POWDER, LYOPHILIZED, FOR SOLUTION INTRAVENOUS at 09:05

## 2022-06-14 RX ADMIN — RANOLAZINE 500 MG: 500 TABLET, FILM COATED, EXTENDED RELEASE ORAL at 08:54

## 2022-06-14 RX ADMIN — MELATONIN 3 MG: 3 TAB ORAL at 20:07

## 2022-06-14 RX ADMIN — AMLODIPINE BESYLATE 2.5 MG: 2.5 TABLET ORAL at 08:54

## 2022-06-14 RX ADMIN — IPRATROPIUM BROMIDE AND ALBUTEROL SULFATE 3 ML: 2.5; .5 SOLUTION RESPIRATORY (INHALATION) at 07:55

## 2022-06-14 RX ADMIN — FOLIC ACID 1000 MCG: 1 TABLET ORAL at 08:54

## 2022-06-15 ENCOUNTER — APPOINTMENT (OUTPATIENT)
Dept: PULMONOLOGY | Facility: HOSPITAL | Age: 62
End: 2022-06-15

## 2022-06-15 LAB
GLUCOSE BLDC GLUCOMTR-MCNC: 116 MG/DL (ref 70–130)
GLUCOSE BLDC GLUCOMTR-MCNC: 154 MG/DL (ref 70–130)
GLUCOSE BLDC GLUCOMTR-MCNC: 172 MG/DL (ref 70–130)
GLUCOSE BLDC GLUCOMTR-MCNC: 202 MG/DL (ref 70–130)

## 2022-06-15 PROCEDURE — 99232 SBSQ HOSP IP/OBS MODERATE 35: CPT | Performed by: PSYCHIATRY & NEUROLOGY

## 2022-06-15 PROCEDURE — 63710000001 INSULIN DETEMIR PER 5 UNITS: Performed by: INTERNAL MEDICINE

## 2022-06-15 PROCEDURE — 95816 EEG AWAKE AND DROWSY: CPT

## 2022-06-15 PROCEDURE — 94799 UNLISTED PULMONARY SVC/PX: CPT

## 2022-06-15 PROCEDURE — 63710000001 INSULIN ASPART PER 5 UNITS: Performed by: INTERNAL MEDICINE

## 2022-06-15 PROCEDURE — 99232 SBSQ HOSP IP/OBS MODERATE 35: CPT | Performed by: INTERNAL MEDICINE

## 2022-06-15 PROCEDURE — 82962 GLUCOSE BLOOD TEST: CPT

## 2022-06-15 PROCEDURE — 25010000002 ENOXAPARIN PER 10 MG: Performed by: INTERNAL MEDICINE

## 2022-06-15 RX ORDER — METOCLOPRAMIDE 10 MG/1
10 TABLET ORAL
Status: DISCONTINUED | OUTPATIENT
Start: 2022-06-15 | End: 2022-06-17 | Stop reason: HOSPADM

## 2022-06-15 RX ORDER — GABAPENTIN 100 MG/1
100 CAPSULE ORAL 3 TIMES DAILY
Status: DISCONTINUED | OUTPATIENT
Start: 2022-06-15 | End: 2022-06-16

## 2022-06-15 RX ADMIN — ATORVASTATIN CALCIUM 80 MG: 40 TABLET, FILM COATED ORAL at 08:34

## 2022-06-15 RX ADMIN — METOCLOPRAMIDE 10 MG: 10 TABLET ORAL at 20:04

## 2022-06-15 RX ADMIN — POLYETHYLENE GLYCOL 3350 17 G: 17 POWDER, FOR SOLUTION ORAL at 08:34

## 2022-06-15 RX ADMIN — PANTOPRAZOLE SODIUM 40 MG: 40 TABLET, DELAYED RELEASE ORAL at 08:34

## 2022-06-15 RX ADMIN — MONTELUKAST 10 MG: 10 TABLET, FILM COATED ORAL at 20:05

## 2022-06-15 RX ADMIN — ASPIRIN 325 MG: 325 TABLET, COATED ORAL at 08:34

## 2022-06-15 RX ADMIN — GABAPENTIN 200 MG: 100 CAPSULE ORAL at 09:13

## 2022-06-15 RX ADMIN — AMLODIPINE BESYLATE 2.5 MG: 2.5 TABLET ORAL at 08:34

## 2022-06-15 RX ADMIN — ISOSORBIDE MONONITRATE 30 MG: 30 TABLET, EXTENDED RELEASE ORAL at 08:34

## 2022-06-15 RX ADMIN — INSULIN DETEMIR 20 UNITS: 100 INJECTION, SOLUTION SUBCUTANEOUS at 20:05

## 2022-06-15 RX ADMIN — GABAPENTIN 100 MG: 100 CAPSULE ORAL at 20:04

## 2022-06-15 RX ADMIN — FOLIC ACID 1000 MCG: 1 TABLET ORAL at 08:34

## 2022-06-15 RX ADMIN — RANOLAZINE 500 MG: 500 TABLET, FILM COATED, EXTENDED RELEASE ORAL at 20:05

## 2022-06-15 RX ADMIN — Medication 7.5 MG: at 08:34

## 2022-06-15 RX ADMIN — IPRATROPIUM BROMIDE AND ALBUTEROL SULFATE 3 ML: 2.5; .5 SOLUTION RESPIRATORY (INHALATION) at 19:53

## 2022-06-15 RX ADMIN — ENOXAPARIN SODIUM 40 MG: 40 INJECTION SUBCUTANEOUS at 08:34

## 2022-06-15 RX ADMIN — METOCLOPRAMIDE 10 MG: 10 TABLET ORAL at 17:22

## 2022-06-15 RX ADMIN — INSULIN ASPART 2 UNITS: 100 INJECTION, SOLUTION INTRAVENOUS; SUBCUTANEOUS at 17:23

## 2022-06-15 RX ADMIN — INSULIN ASPART 2 UNITS: 100 INJECTION, SOLUTION INTRAVENOUS; SUBCUTANEOUS at 13:11

## 2022-06-15 RX ADMIN — METOPROLOL SUCCINATE 50 MG: 50 TABLET, EXTENDED RELEASE ORAL at 08:34

## 2022-06-15 RX ADMIN — CLOPIDOGREL BISULFATE 75 MG: 75 TABLET ORAL at 08:34

## 2022-06-15 RX ADMIN — RANOLAZINE 500 MG: 500 TABLET, FILM COATED, EXTENDED RELEASE ORAL at 08:34

## 2022-06-15 RX ADMIN — MELATONIN 3 MG: 3 TAB ORAL at 20:04

## 2022-06-16 LAB
ANION GAP SERPL CALCULATED.3IONS-SCNC: 10 MMOL/L (ref 5–15)
BASOPHILS # BLD AUTO: 0.05 10*3/MM3 (ref 0–0.2)
BASOPHILS NFR BLD AUTO: 0.5 % (ref 0–1.5)
BUN SERPL-MCNC: 10 MG/DL (ref 8–23)
BUN/CREAT SERPL: 9.6 (ref 7–25)
CALCIUM SPEC-SCNC: 8.8 MG/DL (ref 8.6–10.5)
CHLORIDE SERPL-SCNC: 109 MMOL/L (ref 98–107)
CO2 SERPL-SCNC: 23 MMOL/L (ref 22–29)
CREAT SERPL-MCNC: 1.04 MG/DL (ref 0.57–1)
DEPRECATED RDW RBC AUTO: 45.8 FL (ref 37–54)
EGFRCR SERPLBLD CKD-EPI 2021: 61.7 ML/MIN/1.73
EOSINOPHIL # BLD AUTO: 0.51 10*3/MM3 (ref 0–0.4)
EOSINOPHIL NFR BLD AUTO: 5.3 % (ref 0.3–6.2)
ERYTHROCYTE [DISTWIDTH] IN BLOOD BY AUTOMATED COUNT: 13.7 % (ref 12.3–15.4)
GLUCOSE BLDC GLUCOMTR-MCNC: 221 MG/DL (ref 70–130)
GLUCOSE BLDC GLUCOMTR-MCNC: 242 MG/DL (ref 70–130)
GLUCOSE BLDC GLUCOMTR-MCNC: 247 MG/DL (ref 70–130)
GLUCOSE BLDC GLUCOMTR-MCNC: 269 MG/DL (ref 70–130)
GLUCOSE SERPL-MCNC: 220 MG/DL (ref 65–99)
HCT VFR BLD AUTO: 33 % (ref 34–46.6)
HGB BLD-MCNC: 11 G/DL (ref 12–15.9)
IMM GRANULOCYTES # BLD AUTO: 0.08 10*3/MM3 (ref 0–0.05)
IMM GRANULOCYTES NFR BLD AUTO: 0.8 % (ref 0–0.5)
LYMPHOCYTES # BLD AUTO: 2.59 10*3/MM3 (ref 0.7–3.1)
LYMPHOCYTES NFR BLD AUTO: 26.9 % (ref 19.6–45.3)
MCH RBC QN AUTO: 30.8 PG (ref 26.6–33)
MCHC RBC AUTO-ENTMCNC: 33.3 G/DL (ref 31.5–35.7)
MCV RBC AUTO: 92.4 FL (ref 79–97)
MONOCYTES # BLD AUTO: 0.86 10*3/MM3 (ref 0.1–0.9)
MONOCYTES NFR BLD AUTO: 8.9 % (ref 5–12)
NEUTROPHILS NFR BLD AUTO: 5.55 10*3/MM3 (ref 1.7–7)
NEUTROPHILS NFR BLD AUTO: 57.6 % (ref 42.7–76)
NRBC BLD AUTO-RTO: 0 /100 WBC (ref 0–0.2)
PLATELET # BLD AUTO: 352 10*3/MM3 (ref 140–450)
PMV BLD AUTO: 9.8 FL (ref 6–12)
POTASSIUM SERPL-SCNC: 3.6 MMOL/L (ref 3.5–5.2)
RBC # BLD AUTO: 3.57 10*6/MM3 (ref 3.77–5.28)
SODIUM SERPL-SCNC: 142 MMOL/L (ref 136–145)
WBC NRBC COR # BLD: 9.64 10*3/MM3 (ref 3.4–10.8)

## 2022-06-16 PROCEDURE — 63710000001 INSULIN ASPART PER 5 UNITS: Performed by: INTERNAL MEDICINE

## 2022-06-16 PROCEDURE — 63710000001 INSULIN DETEMIR PER 5 UNITS: Performed by: INTERNAL MEDICINE

## 2022-06-16 PROCEDURE — 99232 SBSQ HOSP IP/OBS MODERATE 35: CPT | Performed by: PSYCHIATRY & NEUROLOGY

## 2022-06-16 PROCEDURE — 85025 COMPLETE CBC W/AUTO DIFF WBC: CPT | Performed by: HOSPITALIST

## 2022-06-16 PROCEDURE — 25010000002 ENOXAPARIN PER 10 MG: Performed by: INTERNAL MEDICINE

## 2022-06-16 PROCEDURE — 94799 UNLISTED PULMONARY SVC/PX: CPT

## 2022-06-16 PROCEDURE — 99231 SBSQ HOSP IP/OBS SF/LOW 25: CPT | Performed by: INTERNAL MEDICINE

## 2022-06-16 PROCEDURE — 80048 BASIC METABOLIC PNL TOTAL CA: CPT | Performed by: HOSPITALIST

## 2022-06-16 PROCEDURE — 82962 GLUCOSE BLOOD TEST: CPT

## 2022-06-16 RX ORDER — ARIPIPRAZOLE 5 MG/1
5 TABLET ORAL DAILY
Qty: 30 TABLET | Refills: 0 | Status: SHIPPED | OUTPATIENT
Start: 2022-06-17 | End: 2022-06-27 | Stop reason: SDUPTHER

## 2022-06-16 RX ORDER — VENLAFAXINE HYDROCHLORIDE 75 MG/1
75 CAPSULE, EXTENDED RELEASE ORAL
Status: DISCONTINUED | OUTPATIENT
Start: 2022-06-17 | End: 2022-06-17 | Stop reason: HOSPADM

## 2022-06-16 RX ORDER — GABAPENTIN 100 MG/1
100 CAPSULE ORAL EVERY 12 HOURS SCHEDULED
Qty: 14 CAPSULE | Refills: 0 | Status: SHIPPED | OUTPATIENT
Start: 2022-06-16 | End: 2022-06-27 | Stop reason: SDUPTHER

## 2022-06-16 RX ORDER — ARIPIPRAZOLE 5 MG/1
5 TABLET ORAL DAILY
Status: DISCONTINUED | OUTPATIENT
Start: 2022-06-16 | End: 2022-06-17 | Stop reason: HOSPADM

## 2022-06-16 RX ORDER — GABAPENTIN 100 MG/1
100 CAPSULE ORAL EVERY 12 HOURS SCHEDULED
Status: DISCONTINUED | OUTPATIENT
Start: 2022-06-16 | End: 2022-06-17 | Stop reason: HOSPADM

## 2022-06-16 RX ORDER — VENLAFAXINE HYDROCHLORIDE 75 MG/1
75 CAPSULE, EXTENDED RELEASE ORAL
Qty: 30 CAPSULE | Refills: 0 | Status: CANCELLED | OUTPATIENT
Start: 2022-06-17

## 2022-06-16 RX ORDER — VENLAFAXINE HYDROCHLORIDE 75 MG/1
75 CAPSULE, EXTENDED RELEASE ORAL
Qty: 30 CAPSULE | Refills: 0 | Status: SHIPPED | OUTPATIENT
Start: 2022-06-17 | End: 2022-06-27 | Stop reason: SDUPTHER

## 2022-06-16 RX ORDER — LANOLIN ALCOHOL/MO/W.PET/CERES
3 CREAM (GRAM) TOPICAL NIGHTLY
Qty: 30 TABLET | Refills: 0 | Status: SHIPPED | OUTPATIENT
Start: 2022-06-16 | End: 2022-06-27 | Stop reason: DRUGHIGH

## 2022-06-16 RX ADMIN — MONTELUKAST 10 MG: 10 TABLET, FILM COATED ORAL at 20:04

## 2022-06-16 RX ADMIN — RANOLAZINE 500 MG: 500 TABLET, FILM COATED, EXTENDED RELEASE ORAL at 20:04

## 2022-06-16 RX ADMIN — METOCLOPRAMIDE 10 MG: 10 TABLET ORAL at 08:56

## 2022-06-16 RX ADMIN — GABAPENTIN 100 MG: 100 CAPSULE ORAL at 20:04

## 2022-06-16 RX ADMIN — INSULIN ASPART 3 UNITS: 100 INJECTION, SOLUTION INTRAVENOUS; SUBCUTANEOUS at 12:23

## 2022-06-16 RX ADMIN — INSULIN ASPART 3 UNITS: 100 INJECTION, SOLUTION INTRAVENOUS; SUBCUTANEOUS at 08:04

## 2022-06-16 RX ADMIN — PANTOPRAZOLE SODIUM 40 MG: 40 TABLET, DELAYED RELEASE ORAL at 08:56

## 2022-06-16 RX ADMIN — ISOSORBIDE MONONITRATE 30 MG: 30 TABLET, EXTENDED RELEASE ORAL at 08:56

## 2022-06-16 RX ADMIN — METOCLOPRAMIDE 10 MG: 10 TABLET ORAL at 20:04

## 2022-06-16 RX ADMIN — METOCLOPRAMIDE 10 MG: 10 TABLET ORAL at 17:34

## 2022-06-16 RX ADMIN — INSULIN ASPART 3 UNITS: 100 INJECTION, SOLUTION INTRAVENOUS; SUBCUTANEOUS at 17:30

## 2022-06-16 RX ADMIN — RANOLAZINE 500 MG: 500 TABLET, FILM COATED, EXTENDED RELEASE ORAL at 08:56

## 2022-06-16 RX ADMIN — FOLIC ACID 1000 MCG: 1 TABLET ORAL at 08:56

## 2022-06-16 RX ADMIN — GABAPENTIN 100 MG: 100 CAPSULE ORAL at 08:56

## 2022-06-16 RX ADMIN — INSULIN DETEMIR 20 UNITS: 100 INJECTION, SOLUTION SUBCUTANEOUS at 20:06

## 2022-06-16 RX ADMIN — IPRATROPIUM BROMIDE AND ALBUTEROL SULFATE 3 ML: 2.5; .5 SOLUTION RESPIRATORY (INHALATION) at 19:10

## 2022-06-16 RX ADMIN — ARIPIPRAZOLE 5 MG: 5 TABLET ORAL at 08:56

## 2022-06-16 RX ADMIN — METOPROLOL SUCCINATE 50 MG: 50 TABLET, EXTENDED RELEASE ORAL at 08:56

## 2022-06-16 RX ADMIN — IPRATROPIUM BROMIDE AND ALBUTEROL SULFATE 3 ML: 2.5; .5 SOLUTION RESPIRATORY (INHALATION) at 06:45

## 2022-06-16 RX ADMIN — ASPIRIN 325 MG: 325 TABLET, COATED ORAL at 08:56

## 2022-06-16 RX ADMIN — METOCLOPRAMIDE 10 MG: 10 TABLET ORAL at 13:08

## 2022-06-16 RX ADMIN — MELATONIN 3 MG: 3 TAB ORAL at 20:04

## 2022-06-16 RX ADMIN — CLOPIDOGREL BISULFATE 75 MG: 75 TABLET ORAL at 08:56

## 2022-06-16 RX ADMIN — ATORVASTATIN CALCIUM 80 MG: 40 TABLET, FILM COATED ORAL at 08:55

## 2022-06-16 RX ADMIN — AMLODIPINE BESYLATE 2.5 MG: 2.5 TABLET ORAL at 08:56

## 2022-06-16 RX ADMIN — ENOXAPARIN SODIUM 40 MG: 40 INJECTION SUBCUTANEOUS at 08:56

## 2022-06-17 ENCOUNTER — HOME HEALTH ADMISSION (OUTPATIENT)
Dept: HOME HEALTH SERVICES | Facility: HOME HEALTHCARE | Age: 62
End: 2022-06-17

## 2022-06-17 ENCOUNTER — READMISSION MANAGEMENT (OUTPATIENT)
Dept: CALL CENTER | Facility: HOSPITAL | Age: 62
End: 2022-06-17

## 2022-06-17 VITALS
OXYGEN SATURATION: 96 % | RESPIRATION RATE: 18 BRPM | HEIGHT: 68 IN | SYSTOLIC BLOOD PRESSURE: 121 MMHG | WEIGHT: 260 LBS | HEART RATE: 65 BPM | BODY MASS INDEX: 39.4 KG/M2 | TEMPERATURE: 97.5 F | DIASTOLIC BLOOD PRESSURE: 60 MMHG

## 2022-06-17 DIAGNOSIS — M54.41 CHRONIC RIGHT-SIDED LOW BACK PAIN WITH RIGHT-SIDED SCIATICA: ICD-10-CM

## 2022-06-17 DIAGNOSIS — G54.6 PHANTOM PAIN AFTER AMPUTATION OF LOWER EXTREMITY: Chronic | ICD-10-CM

## 2022-06-17 DIAGNOSIS — G89.29 CHRONIC RIGHT-SIDED LOW BACK PAIN WITH RIGHT-SIDED SCIATICA: ICD-10-CM

## 2022-06-17 LAB
GLUCOSE BLDC GLUCOMTR-MCNC: 251 MG/DL (ref 70–130)
GLUCOSE BLDC GLUCOMTR-MCNC: 315 MG/DL (ref 70–130)

## 2022-06-17 PROCEDURE — 94799 UNLISTED PULMONARY SVC/PX: CPT

## 2022-06-17 PROCEDURE — 63710000001 INSULIN ASPART PER 5 UNITS: Performed by: INTERNAL MEDICINE

## 2022-06-17 PROCEDURE — 82962 GLUCOSE BLOOD TEST: CPT

## 2022-06-17 RX ADMIN — METOCLOPRAMIDE 10 MG: 10 TABLET ORAL at 09:14

## 2022-06-17 RX ADMIN — RANOLAZINE 500 MG: 500 TABLET, FILM COATED, EXTENDED RELEASE ORAL at 09:14

## 2022-06-17 RX ADMIN — AMLODIPINE BESYLATE 2.5 MG: 2.5 TABLET ORAL at 09:15

## 2022-06-17 RX ADMIN — IPRATROPIUM BROMIDE AND ALBUTEROL SULFATE 3 ML: 2.5; .5 SOLUTION RESPIRATORY (INHALATION) at 07:36

## 2022-06-17 RX ADMIN — ISOSORBIDE MONONITRATE 30 MG: 30 TABLET, EXTENDED RELEASE ORAL at 09:14

## 2022-06-17 RX ADMIN — HYDROCODONE BITARTRATE AND ACETAMINOPHEN 1 TABLET: 7.5; 325 TABLET ORAL at 00:22

## 2022-06-17 RX ADMIN — ARIPIPRAZOLE 5 MG: 5 TABLET ORAL at 09:47

## 2022-06-17 RX ADMIN — CLOPIDOGREL BISULFATE 75 MG: 75 TABLET ORAL at 09:13

## 2022-06-17 RX ADMIN — PANTOPRAZOLE SODIUM 40 MG: 40 TABLET, DELAYED RELEASE ORAL at 09:14

## 2022-06-17 RX ADMIN — ATORVASTATIN CALCIUM 80 MG: 40 TABLET, FILM COATED ORAL at 09:13

## 2022-06-17 RX ADMIN — METOPROLOL SUCCINATE 50 MG: 50 TABLET, EXTENDED RELEASE ORAL at 09:14

## 2022-06-17 RX ADMIN — INSULIN ASPART 5 UNITS: 100 INJECTION, SOLUTION INTRAVENOUS; SUBCUTANEOUS at 11:35

## 2022-06-17 RX ADMIN — GABAPENTIN 100 MG: 100 CAPSULE ORAL at 09:15

## 2022-06-17 RX ADMIN — ASPIRIN 325 MG: 325 TABLET, COATED ORAL at 09:14

## 2022-06-17 RX ADMIN — VENLAFAXINE HYDROCHLORIDE 75 MG: 75 CAPSULE, EXTENDED RELEASE ORAL at 09:14

## 2022-06-17 RX ADMIN — FOLIC ACID 1000 MCG: 1 TABLET ORAL at 09:15

## 2022-06-17 RX ADMIN — INSULIN ASPART 4 UNITS: 100 INJECTION, SOLUTION INTRAVENOUS; SUBCUTANEOUS at 08:02

## 2022-06-17 NOTE — OUTREACH NOTE
Prep Survey    Flowsheet Row Responses   [facility] facility patient discharged from? [place]   Is LACE score < 7 ? No   Emergency Room discharge w/ pulse ox? No   Eligibility [facility]   Date of Admission 06/05/22   Date of Discharge 06/17/22   Discharge Disposition Home-Health Care Svc   Discharge diagnosis SOA, deconditioning, DM, CHF, CKD, DM, CAD   Does the patient have one of the following disease processes/diagnoses(primary or secondary)? Other   Does the patient have Home health ordered? Yes   What is the Home health agency?  [facility]   Is there a DME ordered? No   Prep survey completed? Yes          MALLIKA HARRISON - Registered Nurse

## 2022-06-17 NOTE — TELEPHONE ENCOUNTER
Incoming Refill Request      Medication requested (name and dose):     HYDROcodone-acetaminophen (NORCO) 7.5-325 MG per tablet      [facility]    Additional details provided by patient:    Best call back number:    Does the patient have less than a 3 day supply:  [] Yes  [] No    Tiffanie Vaughan  06/17/22, 10:02 CDT

## 2022-06-18 ENCOUNTER — HOME CARE VISIT (OUTPATIENT)
Dept: HOME HEALTH SERVICES | Facility: CLINIC | Age: 62
End: 2022-06-18

## 2022-06-18 PROCEDURE — G0493 RN CARE EA 15 MIN HH/HOSPICE: HCPCS

## 2022-06-20 ENCOUNTER — TRANSITIONAL CARE MANAGEMENT TELEPHONE ENCOUNTER (OUTPATIENT)
Dept: CALL CENTER | Facility: HOSPITAL | Age: 62
End: 2022-06-20

## 2022-06-20 ENCOUNTER — HOME CARE VISIT (OUTPATIENT)
Dept: HOME HEALTH SERVICES | Facility: CLINIC | Age: 62
End: 2022-06-20

## 2022-06-20 VITALS
TEMPERATURE: 97.4 F | SYSTOLIC BLOOD PRESSURE: 176 MMHG | HEART RATE: 64 BPM | DIASTOLIC BLOOD PRESSURE: 84 MMHG | BODY MASS INDEX: 41.97 KG/M2 | RESPIRATION RATE: 20 BRPM | HEIGHT: 66 IN

## 2022-06-20 VITALS
OXYGEN SATURATION: 97 % | DIASTOLIC BLOOD PRESSURE: 78 MMHG | TEMPERATURE: 98.1 F | RESPIRATION RATE: 16 BRPM | SYSTOLIC BLOOD PRESSURE: 152 MMHG | HEART RATE: 69 BPM

## 2022-06-20 PROCEDURE — G0151 HHCP-SERV OF PT,EA 15 MIN: HCPCS

## 2022-06-20 PROCEDURE — G0494 LPN CARE EA 15MIN HH/HOSPICE: HCPCS

## 2022-06-20 RX ORDER — METOCLOPRAMIDE 10 MG/1
10 TABLET ORAL 4 TIMES DAILY PRN
Qty: 120 TABLET | Refills: 1 | Status: SHIPPED | OUTPATIENT
Start: 2022-06-20 | End: 2022-06-27 | Stop reason: SDUPTHER

## 2022-06-20 NOTE — OUTREACH NOTE
Call Center TCM Note    Flowsheet Row Responses   [facility] patient discharged from? [place]   Does the patient have one of the following disease processes/diagnoses(primary or secondary)? Other   TCM attempt successful? Yes   Call start time 1359   Call end time 1402   Discharge diagnosis SOA, deconditioning, DM, CHF, CKD, DM, CAD   Does the patient have all medications ordered at discharge? Yes   Is the patient taking all medications as directed (includes completed medication regime)? Yes   Does the patient have a primary care provider?  Yes   Does the patient have an appointment with their PCP within 7 days of discharge? Yes   Comments regarding PCP will see PCP on 6/27   Has the patient kept scheduled appointments due by today? N/A   What [facility]   Has home health visited the patient within 72 hours of discharge? Yes   Psychosocial issues? No   Did the patient receive a copy of their discharge instructions? Yes   What is the patient's perception of their health status since discharge? Improving   Is the patient/caregiver able to teach back the hierarchy of who to call/visit for symptoms/problems? PCP, Specialist, Home health nurse, Urgent Care, ED, 911 Yes   TCM call completed? Yes   Wrap up additional comments Doing well, no questions at this time, confirmed appt with PCP for 6/27.          Yolanda Thomas RN    6/20/2022, 14:03 CDT

## 2022-06-21 ENCOUNTER — TELEPHONE (OUTPATIENT)
Dept: FAMILY MEDICINE CLINIC | Facility: CLINIC | Age: 62
End: 2022-06-21

## 2022-06-21 RX ORDER — HYDROCODONE BITARTRATE AND ACETAMINOPHEN 7.5; 325 MG/1; MG/1
1 TABLET ORAL EVERY 6 HOURS PRN
Qty: 120 TABLET | Refills: 0 | Status: ON HOLD | OUTPATIENT
Start: 2022-06-21 | End: 2022-07-07 | Stop reason: SDUPTHER

## 2022-06-21 NOTE — TELEPHONE ENCOUNTER
Patient seen in office every 3 months for chronic pain requiring opiate pain medication. Compliant with medication, visits with no adverse effects noted. LESTER and UDS current and appropriate. Patient called requesting scheduled refill at appropriate interval. Patient understands the risks associated with this controlled medication, including tolerance and addiction.  Patient also agrees to only obtain this medication from me, and not from a another provider, unless that provider is covering for me in my absence.  Patient also agrees to be compliant in dosing, and not self adjust the dose of medication.  A signed controlled substance agreement is on file, and the patient has received a controlled substance education sheet at this a previous visit.  The patient has also signed a consent for treatment with a controlled substance as per [facility] policy. LESTER was obtained.   Refill sent for hydrocodone.     This document has been electronically signed by BEBE Palomino on June 21, 2022 10:19 CDT

## 2022-06-21 NOTE — HOME HEALTH
"Subjective/Patient History: 62 y.o. female who presented to [facility] with complaint of chest pain and shortness of breath.  The patient is admitted to hospital 6/5/22. Dr. Navas from cardiology saw the patient in consultation on 6/7/2022 for chest pain.He recommended to continue home medications Imdur and Ranexa and metoprolol.  The patient was started on amlodipine.  He felt as though the chest pain was musculoskeletal in nature and did not require any invasive evaluation. Patient remained stable over course of care and was DC home in stable condition w/ assistance from  at home. Patient reporting doing well since returning home but utilzing WC mostly for mobility when she was ambulating w/ RW and use of L prosthesis prior to admission. Reporting no pain this date and ready to begin therapy to get back to walking again and build strength. No other new issues to report at this time.     Prior Level of Function: mod i w/ ambulation using prosthesis and RW, mod i w/ ADLs and transfers, ambulating short community distances w/ RW and long distances requiring use of manual WC    Past Medical History:  Shortness of breath.    Diabetes    CHF no exacerbation during admission    Hypertension    Chronic kidney disease stage II.    Diabetes mellitus    Coronary artery disease    Depressive disorder    Deconditioning      Patient Goal:\" walk again, get stronger, return to prior level of independence\""

## 2022-06-22 ENCOUNTER — OFFICE VISIT (OUTPATIENT)
Dept: ENDOCRINOLOGY | Facility: CLINIC | Age: 62
End: 2022-06-22

## 2022-06-22 VITALS
SYSTOLIC BLOOD PRESSURE: 150 MMHG | HEIGHT: 68 IN | HEART RATE: 62 BPM | OXYGEN SATURATION: 96 % | DIASTOLIC BLOOD PRESSURE: 80 MMHG | BODY MASS INDEX: 39.4 KG/M2 | WEIGHT: 260 LBS

## 2022-06-22 DIAGNOSIS — I10 UNCONTROLLED HYPERTENSION: ICD-10-CM

## 2022-06-22 DIAGNOSIS — E55.9 VITAMIN D DEFICIENCY: ICD-10-CM

## 2022-06-22 DIAGNOSIS — E78.2 MIXED HYPERLIPIDEMIA: ICD-10-CM

## 2022-06-22 DIAGNOSIS — Z79.4 TYPE 2 DIABETES MELLITUS WITH HYPERGLYCEMIA, WITH LONG-TERM CURRENT USE OF INSULIN: Primary | ICD-10-CM

## 2022-06-22 DIAGNOSIS — I10 PRIMARY HYPERTENSION: ICD-10-CM

## 2022-06-22 DIAGNOSIS — E11.65 TYPE 2 DIABETES MELLITUS WITH HYPERGLYCEMIA, WITH LONG-TERM CURRENT USE OF INSULIN: Primary | ICD-10-CM

## 2022-06-22 PROCEDURE — 99214 OFFICE O/P EST MOD 30 MIN: CPT | Performed by: NURSE PRACTITIONER

## 2022-06-22 NOTE — PROGRESS NOTES
"Chief Complaint  Diabetes    Subjective          Ariana Martinez presents to [facility] ENDOCRINOLOGY  History of Present Illness       In office visit       Primary provider BEBE Villatoro       62 female presents for follow-up    Reason diabetes mellitus type 2    Diagnosed at age 26    Timing constant    Severity high    Quality not controlled      Macrovascular complications CAD, 2 stents placed in February 2020      Microvascular complications neuropathy, left BKA     Current diabetes regimen      Insulin by injections and oral medication      Current glucose monitoring    Fingersticks    Checks 4 times daily    Am readings -- this am was 129     Am readings are under 130     Daytime readings --- up to 180       She was having lows but decreased her lantus and now resolved       Review of Systems - General ROS: negative               Objective   Vital Signs:   /80   Pulse 62   Ht 172.7 cm (68\")   Wt 118 kg (260 lb)   SpO2 96%   BMI 39.53 kg/m²     Physical Exam  Constitutional:       Appearance: Normal appearance.   Cardiovascular:      Rate and Rhythm: Regular rhythm.      Heart sounds: Normal heart sounds.   Pulmonary:      Breath sounds: Normal breath sounds.   Musculoskeletal:      Cervical back: Normal range of motion.   Neurological:      Mental Status: She is alert.        Result Review :   The following data was reviewed by: BEBE Prince on 02/21/2022:  Common labs    Common Labsle 6/9/22 6/13/22 6/13/22 6/16/22 6/16/22     0604 [id number] 0640 0640   Glucose 173 (A)  294 (A)  220 (A)   BUN 19  15  10   Creatinine 1.45 (A)  1.63 (A)  1.04 (A)   Sodium 134 (A)  139  142   Potassium 4.6  4.2  3.6   Chloride 100  102  109 (A)   Calcium 9.0  9.4  8.8   WBC  13.70 (A)  9.64    Hemoglobin  12.0  11.0 (A)    Hematocrit  34.6  33.0 (A)    Platelets  352  352    (A) Abnormal value                        Assessment and Plan    Diagnoses and all orders for this " visit:    1. Type 2 diabetes mellitus with hyperglycemia, with long-term current use of insulin (HCC) (Primary)  -     CBC & Differential; Future  -     Comprehensive Metabolic Panel; Future  -     Hemoglobin A1c; Future  -     Lipid Panel; Future  -     Microalbumin / Creatinine Urine Ratio - Urine, Clean Catch; Future  -     TSH; Future  -     Vitamin B12; Future  -     Vitamin D 25 Hydroxy; Future    2. Primary hypertension  -     CBC & Differential; Future  -     Comprehensive Metabolic Panel; Future  -     Hemoglobin A1c; Future  -     Lipid Panel; Future  -     Microalbumin / Creatinine Urine Ratio - Urine, Clean Catch; Future  -     TSH; Future  -     Vitamin B12; Future  -     Vitamin D 25 Hydroxy; Future    3. Mixed hyperlipidemia  -     CBC & Differential; Future  -     Comprehensive Metabolic Panel; Future  -     Hemoglobin A1c; Future  -     Lipid Panel; Future  -     Microalbumin / Creatinine Urine Ratio - Urine, Clean Catch; Future  -     TSH; Future  -     Vitamin B12; Future  -     Vitamin D 25 Hydroxy; Future    4. Vitamin D deficiency  -     CBC & Differential; Future  -     Comprehensive Metabolic Panel; Future  -     Hemoglobin A1c; Future  -     Lipid Panel; Future  -     Microalbumin / Creatinine Urine Ratio - Urine, Clean Catch; Future  -     TSH; Future  -     Vitamin B12; Future  -     Vitamin D 25 Hydroxy; Future    5. Uncontrolled hypertension               Glycemic Management     Diabetes mellitus not controlled         Lab Results   Component Value Date    HGBA1C 11.10 (H) 06/01/2022             Dexcom sensor ---off due to finances          Basaglar taking 30 units BID --keep this dose              Novolog taking for meals     Taking 25 units TID     We discussed adjusting for meals and bg         + sliding scale                ==================     Jardiance-- stopped       Metformin--stopped       bydureon - stopped     Victoza stopped due to side effects         januvia 100 mg  daily  -stopped               Lipid Management        Taking lipitor 80- mg one at night               LDL needs to be 70 or less           Total Cholesterol   Date Value Ref Range Status   06/06/2022 169 0 - 200 mg/dL Final     Triglycerides   Date Value Ref Range Status   06/06/2022 227 (H) 0 - 150 mg/dL Final     HDL Cholesterol   Date Value Ref Range Status   06/06/2022 41 40 - 60 mg/dL Final     LDL Cholesterol    Date Value Ref Range Status   06/06/2022 90 0 - 100 mg/dL Final           Blood Pressure Management: Control of blood pressure  on ACEi     stopped the lasix     Taking norvasc 2.5 mg increase to 5 mg daily     Taking metoprolol         Microvascular Complication Monitoring:     Neurontin 100 mg TID       Lab Results   Component Value Date    MICROALBUR <1.2 03/05/2021               intermittetly takes reglan for gastroparesis     states eye exam was March 2021, no DR      Left BKA     Follows with podiatry                    Other Diabetes Related Aspects     TSH abnormal from July to Sept 2014     TSH in the 5 to 7 range         She still does not want treatment and guidelines state we do not have to treat until the TSH is 7 or higher        Lab Results   Component Value Date    TSH 4.240 (H) 06/01/2022       Vitamin D deficiency        Taking vitamin D supplement           Component      Latest Ref Rng & Units 8/11/2021   25 Hydroxy, Vitamin D      ng/ml 32.6            Lab Results   Component Value Date    [id number] >2,000 (H) 06/01/2022                      Follow Up   No follow-ups on file.  Patient was given instructions and counseling regarding her condition or for health maintenance advice. Please see specific information pulled into the AVS if appropriate.         This document has been electronically signed by BEBE Prince on June 22, 2022 15:07 CDT.

## 2022-06-24 ENCOUNTER — HOME CARE VISIT (OUTPATIENT)
Dept: HOME HEALTH SERVICES | Facility: CLINIC | Age: 62
End: 2022-06-24

## 2022-06-24 VITALS
DIASTOLIC BLOOD PRESSURE: 84 MMHG | RESPIRATION RATE: 16 BRPM | HEART RATE: 73 BPM | TEMPERATURE: 98.2 F | OXYGEN SATURATION: 97 % | SYSTOLIC BLOOD PRESSURE: 166 MMHG

## 2022-06-24 VITALS
RESPIRATION RATE: 20 BRPM | SYSTOLIC BLOOD PRESSURE: 132 MMHG | OXYGEN SATURATION: 96 % | DIASTOLIC BLOOD PRESSURE: 70 MMHG | HEART RATE: 88 BPM

## 2022-06-24 PROCEDURE — G0494 LPN CARE EA 15MIN HH/HOSPICE: HCPCS

## 2022-06-24 PROCEDURE — G0157 HHC PT ASSISTANT EA 15: HCPCS

## 2022-06-24 NOTE — CASE COMMUNICATION
Patient was seen by me today and her B/P was 166/84 after taking her medication. I called your office and they said it was closed. She was started on Amlodipine 2.5 mg and she may need it increased.I informed patient that I would send a case communication to you and that your office may be calling her today. Thank you.

## 2022-06-27 ENCOUNTER — LAB (OUTPATIENT)
Dept: LAB | Facility: OTHER | Age: 62
End: 2022-06-27

## 2022-06-27 ENCOUNTER — OFFICE VISIT (OUTPATIENT)
Dept: FAMILY MEDICINE CLINIC | Facility: CLINIC | Age: 62
End: 2022-06-27

## 2022-06-27 VITALS
SYSTOLIC BLOOD PRESSURE: 143 MMHG | TEMPERATURE: 96.6 F | DIASTOLIC BLOOD PRESSURE: 70 MMHG | RESPIRATION RATE: 18 BRPM | HEART RATE: 60 BPM | OXYGEN SATURATION: 97 %

## 2022-06-27 VITALS
TEMPERATURE: 97.1 F | OXYGEN SATURATION: 98 % | RESPIRATION RATE: 16 BRPM | BODY MASS INDEX: 39.4 KG/M2 | DIASTOLIC BLOOD PRESSURE: 70 MMHG | WEIGHT: 260 LBS | HEART RATE: 69 BPM | HEIGHT: 68 IN | SYSTOLIC BLOOD PRESSURE: 130 MMHG

## 2022-06-27 DIAGNOSIS — G62.9 NEUROPATHY: Chronic | ICD-10-CM

## 2022-06-27 DIAGNOSIS — F33.1 DEPRESSION, MAJOR, RECURRENT, MODERATE: Chronic | ICD-10-CM

## 2022-06-27 DIAGNOSIS — I10 ESSENTIAL HYPERTENSION: Primary | Chronic | ICD-10-CM

## 2022-06-27 DIAGNOSIS — K31.84 DIABETIC GASTROPARESIS: Chronic | ICD-10-CM

## 2022-06-27 DIAGNOSIS — D64.9 ANEMIA, UNSPECIFIED TYPE: Chronic | ICD-10-CM

## 2022-06-27 DIAGNOSIS — Z79.4 TYPE 2 DIABETES MELLITUS WITH DIABETIC AUTONOMIC NEUROPATHY, WITH LONG-TERM CURRENT USE OF INSULIN: Chronic | ICD-10-CM

## 2022-06-27 DIAGNOSIS — Z09 HOSPITAL DISCHARGE FOLLOW-UP: ICD-10-CM

## 2022-06-27 DIAGNOSIS — E11.43 DIABETIC GASTROPARESIS: Chronic | ICD-10-CM

## 2022-06-27 DIAGNOSIS — D64.9 ANEMIA, UNSPECIFIED TYPE: ICD-10-CM

## 2022-06-27 DIAGNOSIS — E55.9 VITAMIN D DEFICIENCY: ICD-10-CM

## 2022-06-27 DIAGNOSIS — H66.003 ACUTE SUPPURATIVE OTITIS MEDIA OF BOTH EARS WITHOUT SPONTANEOUS RUPTURE OF TYMPANIC MEMBRANES, RECURRENCE NOT SPECIFIED: ICD-10-CM

## 2022-06-27 DIAGNOSIS — N18.30 CKD (CHRONIC KIDNEY DISEASE), SYMPTOM MANAGEMENT ONLY, STAGE 3 (MODERATE): Chronic | ICD-10-CM

## 2022-06-27 DIAGNOSIS — G47.09 OTHER INSOMNIA: ICD-10-CM

## 2022-06-27 DIAGNOSIS — R11.0 CHRONIC NAUSEA: Chronic | ICD-10-CM

## 2022-06-27 DIAGNOSIS — F41.1 GENERALIZED ANXIETY DISORDER: Chronic | ICD-10-CM

## 2022-06-27 DIAGNOSIS — E11.43 TYPE 2 DIABETES MELLITUS WITH DIABETIC AUTONOMIC NEUROPATHY, WITH LONG-TERM CURRENT USE OF INSULIN: Chronic | ICD-10-CM

## 2022-06-27 PROBLEM — I33.0 ACUTE BACTERIAL ENDOCARDITIS: Status: RESOLVED | Noted: 2021-03-13 | Resolved: 2022-06-27

## 2022-06-27 PROBLEM — A49.8 INFECTION DUE TO ACINETOBACTER SPECIES: Status: RESOLVED | Noted: 2021-03-13 | Resolved: 2022-06-27

## 2022-06-27 PROBLEM — H65.92 OTITIS MEDIA WITH EFFUSION, LEFT: Status: RESOLVED | Noted: 2018-12-01 | Resolved: 2022-06-27

## 2022-06-27 PROBLEM — R06.83 SNORING: Chronic | Status: ACTIVE | Noted: 2017-05-08

## 2022-06-27 PROBLEM — D72.829 LEUKOCYTOSIS: Status: RESOLVED | Noted: 2019-09-30 | Resolved: 2022-06-27

## 2022-06-27 PROBLEM — A41.9 SEPSIS (HCC): Status: RESOLVED | Noted: 2021-07-12 | Resolved: 2022-06-27

## 2022-06-27 PROBLEM — R41.0 CONFUSION: Status: RESOLVED | Noted: 2022-05-31 | Resolved: 2022-06-27

## 2022-06-27 PROBLEM — B95.61 STAPHYLOCOCCUS AUREUS BACTEREMIA: Status: RESOLVED | Noted: 2021-03-13 | Resolved: 2022-06-27

## 2022-06-27 PROBLEM — L08.9 LEFT FOOT INFECTION: Status: RESOLVED | Noted: 2021-03-14 | Resolved: 2022-06-27

## 2022-06-27 PROBLEM — M21.172: Status: RESOLVED | Noted: 2019-10-16 | Resolved: 2022-06-27

## 2022-06-27 PROBLEM — D75.839 THROMBOCYTOSIS: Status: RESOLVED | Noted: 2019-09-30 | Resolved: 2022-06-27

## 2022-06-27 PROBLEM — S92.352K: Status: RESOLVED | Noted: 2019-01-04 | Resolved: 2022-06-27

## 2022-06-27 PROBLEM — R79.89 ELEVATED D-DIMER: Chronic | Status: ACTIVE | Noted: 2022-06-06

## 2022-06-27 PROBLEM — E61.1 IRON DEFICIENCY: Status: RESOLVED | Noted: 2019-10-14 | Resolved: 2022-06-27

## 2022-06-27 PROBLEM — R78.81 STAPHYLOCOCCUS AUREUS BACTEREMIA: Status: RESOLVED | Noted: 2021-03-13 | Resolved: 2022-06-27

## 2022-06-27 PROBLEM — R07.9 CHEST PAIN, UNSPECIFIED TYPE: Chronic | Status: RESOLVED | Noted: 2022-04-05 | Resolved: 2022-06-27

## 2022-06-27 PROBLEM — L03.116 CELLULITIS OF LEFT LOWER EXTREMITY: Status: RESOLVED | Noted: 2021-03-09 | Resolved: 2022-06-27

## 2022-06-27 PROBLEM — K52.9 ACUTE COLITIS: Status: RESOLVED | Noted: 2021-07-02 | Resolved: 2022-06-27

## 2022-06-27 PROBLEM — M96.0 NONUNION OF SUBTALAR ARTHRODESIS: Status: RESOLVED | Noted: 2020-09-30 | Resolved: 2022-06-27

## 2022-06-27 PROBLEM — R07.9 CHEST PAIN, UNSPECIFIED TYPE: Chronic | Status: ACTIVE | Noted: 2022-04-05

## 2022-06-27 LAB
ANION GAP SERPL CALCULATED.3IONS-SCNC: 8 MMOL/L (ref 5–15)
BASOPHILS # BLD AUTO: 0.04 10*3/MM3 (ref 0–0.2)
BASOPHILS NFR BLD AUTO: 0.3 % (ref 0–1.5)
BUN SERPL-MCNC: 7 MG/DL (ref 7–23)
BUN/CREAT SERPL: 9.6 (ref 7–25)
CALCIUM SPEC-SCNC: 9 MG/DL (ref 8.4–10.2)
CHLORIDE SERPL-SCNC: 106 MMOL/L (ref 101–112)
CO2 SERPL-SCNC: 27 MMOL/L (ref 22–30)
CREAT SERPL-MCNC: 0.73 MG/DL (ref 0.52–1.04)
DEPRECATED RDW RBC AUTO: 42.8 FL (ref 37–54)
EGFRCR SERPLBLD CKD-EPI 2021: 94.3 ML/MIN/1.73
EOSINOPHIL # BLD AUTO: 0.46 10*3/MM3 (ref 0–0.4)
EOSINOPHIL NFR BLD AUTO: 3.9 % (ref 0.3–6.2)
ERYTHROCYTE [DISTWIDTH] IN BLOOD BY AUTOMATED COUNT: 13.3 % (ref 12.3–15.4)
GLUCOSE SERPL-MCNC: 241 MG/DL (ref 70–99)
HCT VFR BLD AUTO: 33.8 % (ref 34–46.6)
HGB BLD-MCNC: 11 G/DL (ref 12–15.9)
LYMPHOCYTES # BLD AUTO: 2.23 10*3/MM3 (ref 0.7–3.1)
LYMPHOCYTES NFR BLD AUTO: 19 % (ref 19.6–45.3)
MCH RBC QN AUTO: 29.3 PG (ref 26.6–33)
MCHC RBC AUTO-ENTMCNC: 32.5 G/DL (ref 31.5–35.7)
MCV RBC AUTO: 89.9 FL (ref 79–97)
MONOCYTES # BLD AUTO: 0.75 10*3/MM3 (ref 0.1–0.9)
MONOCYTES NFR BLD AUTO: 6.4 % (ref 5–12)
NEUTROPHILS NFR BLD AUTO: 70.4 % (ref 42.7–76)
NEUTROPHILS NFR BLD AUTO: 8.26 10*3/MM3 (ref 1.7–7)
PLATELET # BLD AUTO: 402 10*3/MM3 (ref 140–450)
PMV BLD AUTO: 10 FL (ref 6–12)
POTASSIUM SERPL-SCNC: 3.4 MMOL/L (ref 3.4–5)
RBC # BLD AUTO: 3.76 10*6/MM3 (ref 3.77–5.28)
SODIUM SERPL-SCNC: 141 MMOL/L (ref 137–145)
WBC NRBC COR # BLD: 11.74 10*3/MM3 (ref 3.4–10.8)

## 2022-06-27 PROCEDURE — 85025 COMPLETE CBC W/AUTO DIFF WBC: CPT | Performed by: NURSE PRACTITIONER

## 2022-06-27 PROCEDURE — 99214 OFFICE O/P EST MOD 30 MIN: CPT | Performed by: NURSE PRACTITIONER

## 2022-06-27 PROCEDURE — 80048 BASIC METABOLIC PNL TOTAL CA: CPT | Performed by: NURSE PRACTITIONER

## 2022-06-27 PROCEDURE — 36415 COLL VENOUS BLD VENIPUNCTURE: CPT | Performed by: NURSE PRACTITIONER

## 2022-06-27 RX ORDER — AMLODIPINE BESYLATE 5 MG/1
5 TABLET ORAL DAILY
Qty: 90 TABLET | Refills: 1 | Status: SHIPPED | OUTPATIENT
Start: 2022-06-27 | End: 2023-02-27 | Stop reason: SDUPTHER

## 2022-06-27 RX ORDER — ERGOCALCIFEROL 1.25 MG/1
50000 CAPSULE ORAL
Qty: 36 CAPSULE | Refills: 1 | Status: SHIPPED | OUTPATIENT
Start: 2022-06-27 | End: 2022-12-29

## 2022-06-27 RX ORDER — LISINOPRIL 10 MG/1
10 TABLET ORAL DAILY
Qty: 90 TABLET | Refills: 0 | Status: CANCELLED | OUTPATIENT
Start: 2022-06-27

## 2022-06-27 RX ORDER — LANOLIN ALCOHOL/MO/W.PET/CERES
6 CREAM (GRAM) TOPICAL NIGHTLY
Qty: 60 TABLET | Refills: 5 | Status: SHIPPED | OUTPATIENT
Start: 2022-06-27 | End: 2022-11-18 | Stop reason: SDUPTHER

## 2022-06-27 RX ORDER — GABAPENTIN 100 MG/1
100 CAPSULE ORAL EVERY 12 HOURS SCHEDULED
Qty: 60 CAPSULE | Refills: 2 | Status: SHIPPED | OUTPATIENT
Start: 2022-06-27 | End: 2022-09-07 | Stop reason: SDUPTHER

## 2022-06-27 RX ORDER — VENLAFAXINE HYDROCHLORIDE 75 MG/1
75 CAPSULE, EXTENDED RELEASE ORAL
Qty: 90 CAPSULE | Refills: 1 | Status: SHIPPED | OUTPATIENT
Start: 2022-06-27 | End: 2022-12-14

## 2022-06-27 RX ORDER — METOCLOPRAMIDE 10 MG/1
10 TABLET ORAL 4 TIMES DAILY PRN
Qty: 120 TABLET | Refills: 1 | Status: SHIPPED | OUTPATIENT
Start: 2022-06-27 | End: 2022-10-04 | Stop reason: SDUPTHER

## 2022-06-27 RX ORDER — AZITHROMYCIN 250 MG/1
TABLET, FILM COATED ORAL
Qty: 6 TABLET | Refills: 0 | Status: SHIPPED | OUTPATIENT
Start: 2022-06-27 | End: 2022-07-07 | Stop reason: HOSPADM

## 2022-06-27 RX ORDER — ARIPIPRAZOLE 5 MG/1
5 TABLET ORAL DAILY
Qty: 90 TABLET | Refills: 1 | Status: SHIPPED | OUTPATIENT
Start: 2022-06-27 | End: 2022-12-29

## 2022-06-27 NOTE — PROGRESS NOTES
Subjective   Ariana Martinez is a 62 y.o. female.     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, with long-term current use of insulin; Closed nondisplaced fracture of fifth left metatarsal bone; MAURICIO (generalized anxiety disorder); Depression, major, recurrent, moderate ([facility]); GERD without esophagitis; Long term prescription opiate use; Mixed hyperlipidemia; Vitamin D deficiency; Seasonal allergic rhinitis; Restrictive lung disease secondary to obesity; Adult body mass index 37.0-37.9; Snoring; Class 3 severe obesity due to excess calories without serious comorbidity with body mass index (BMI) of 40.0 to 44.9 in adult ([facility]); (HFpEF) heart failure with preserved ejection fraction ([facility]); Type 2 diabetes mellitus with hyperglycemia, with long-term current use of insulin ([facility]); Cyanocobalamin deficiency; Coronary artery disease of native artery of native heart with stable angina pectoris ([facility]); Hypertension; Meniere's disease; Gastroparesis; Pulmonary hypertension ([facility]); Pes cavus; Primary osteoarthritis involving multiple joints; Generalized anxiety disorder; Chronic right-sided low back pain with right-sided sciatica; Chest pain; Adverse effect of iron; Chest pain due to myocardial ischemia; Nonrheumatic tricuspid valve regurgitation; Iron deficiency anemia due to chronic blood loss; Malaise and fatigue; Ankle arthritis; Urinary retention; Endocarditis; Essential hypertension; Occlusion and stenosis of bilateral carotid arteries; S/P BKA (below knee amputation) unilateral, left ([facility]); Phantom pain after amputation of lower extremity ([facility]); S/P CABG (coronary artery bypass graft); Severe malnutrition ([facility]); TIA (transient ischemic attack); Coronary artery abnormality; Elevated d-dimer; and Neuropathy on their problem list.     Chief Complaint   Patient presents with   • Follow-up     hospital        History of Present Illness   Patient presents for follow up of Hospital discharge of  6/17/22 and reported by home health.   Admitted on moderately elevated HTN. At home her BP has been running in the 160's over 80-90 as per home health readings. She is currently on amlodipine 2.5 mg, Toprol 50mg daily and IMDUR 30mg daily. . /70, HR 69 in office today. No CP nor dizziness.     Peripheral neuropathy stable continue gabapentin.    Diabetic gastroparesis new diagnosis in hospital 6/10/2022.  Chronic nausea improved no longer requiring Reglan 4 times daily but controlled with true as needed use.  Does need refill today.  Reports Reglan is helping continue this on as needed basis not as a scheduled medication to prevent potential adverse side effects.    Depressive disorder/anxiety disorder status reports stable and feels emotions are well controlled with recent medication adjustments made by behavioral health physician before hospital discharge.  Currently on Effexor 75 mg daily and Abilify 5 mg p.o. daily.  Tolerating discontinuance of Remeron well.    T2DM with diabetic autonomic neuropathy with current long-term use of insulin managed by Ms. Lawson in endocrinology [place].  Gabapentin for neuropathy is working well, continue.  Refill needed today.  Reports control of symptoms are adequate with recently decreased dose and frequency.    CKD stage III symptom management only improved on 6/16/2022 labs needs repeated BMP today.    Anemia probably delusional noted on labs 616 in hospital with hemoglobin of 11.0.  Due for repeat CBC today.    6/5/2022 and discharged on 6/17/2022.  Hospital records were reviewed independently and personally at this time.  She presented on 6/5/2022 to the emergency department at [facility].  With shortness of breath found to have elevated D-dimer was complaining of chest pain and labs verified her CKD.    Dr. Navas and cardiology saw the patient in consultation on 6/7/2022 for chest pain.  He recommended to continue home medications Imdur and  Ranexa and Toprol.  Patient was started on amlodipine.  He felt as though the chest pain was musculoskeletal and did not require any invasive evaluation.    Dr. Cody from gastroenterology saw the patient on 6/8/2022 in consultation.  The consult was for nausea and he suspected gastroparesis which was verified with a gastric emptying study.    EGD was performed on 6/10/2022 by Dr. Mendiola.  Initially erythromycin was started for probable gastroparesis and on 6/15/2022 Reglan was substituted for this.  The Reglan did help significantly.    Dr. Velasquez in behavioral health saw the patient on 6/13/2022 due to confusion and hallucinations.  He recommended to change Abilify dose to 7.5 mg stop Remeron and decrease Neurontin to 200 mg 3 times a day.  Environmental changes were ordered to help the patient's orientation in the hospital.    CT scan of the head was completed and negative for acute findings on 6/13/2022    Chest x-ray on 613 shows no acute cardiopulmonary processes.    VQ scan completed on 6/6/2022 with low probability for PE.    COVID-19 screen negative on admission influenza A and B- on admission.    It is noted discharge medications reflect amlodipine 2.5 mg daily as a new medication as well as melatonin 3 mg p.o. nightly for insomnia.  Abilify was decreased to 5 mg daily which she reports is working well.  Gabapentin was decreased to 100 mg every 12 hours which she also reports is working well.  Venlafaxine was continued at 75 mg p.o. daily extended release formula.  Reglan was continued 10 mg p.o. 4 times daily before meals and at bedtime as needed.    She was instructed to stop Pepcid, furosemide, HCTZ, lisinopril, lorazepam and mirtazapine.  Medication reconciliation is performed at this time.  All other medications were continued as per prehospital medication list..     CBC and BMP are needed today.  OtherLabs scheduled for about 8/29/22.     Other complaints today: due for refill gabapentin for  phantom limb pain and diabetic polyneuropathy.     No other complaints today.  Parts of most recent relevant visit HPI, ROS  and PE may be carried forward and all are updated as appropriate for current situation.      Past Surgical History:   Procedure Laterality Date   • ABDOMINAL SURGERY     • AMPUTATION FOOT     • ANGIOPLASTY      coronary   • ANKLE ARTHROSCOPY Left 2/26/2021    Procedure: Left foot hardware removal, ankle arthroscopy, bone grafting , left foot exostectomy;  Surgeon: Ignacio Lord DPM;  Location: [facility] OR;  Service: Podiatry;  Laterality: Left;   • BREAST BIOPSY Right    • CARDIAC CATHETERIZATION     • CARDIAC CATHETERIZATION N/A 6/20/2017    Procedure: Right Heart Cath;  Surgeon: Can Kwon MD PhD;  Location: [facility] CATH INVASIVE LOCATION;  Service:    • CARDIAC CATHETERIZATION N/A 2/18/2020    Procedure: Left Heart Cath;  Surgeon: Catalina Cooper MD;  Location: [facility] CATH INVASIVE LOCATION;  Service: Cardiology;  Laterality: N/A;   • CARDIAC CATHETERIZATION N/A 4/6/2022    Procedure: Left Heart Cath;  Surgeon: Sheryl Navas MD;  Location: [facility] CATH INVASIVE LOCATION;  Service: Cardiology;  Laterality: N/A;   • CARPAL TUNNEL RELEASE     • CHOLECYSTECTOMY     • COLONOSCOPY N/A 6/24/2020    Procedure: COLONOSCOPY;  Surgeon: Julián Maldonado MD;  Location: [facility] ENDOSCOPY;  Service: Gastroenterology;  Laterality: N/A;   • CORONARY ARTERY BYPASS GRAFT  2005   • ENDOSCOPY N/A 10/19/2018    Procedure: ESOPHAGOGASTRODUODENOSCOPY possible dilation;  Surgeon: Julián Maldonado MD;  Location: [facility] ENDOSCOPY;  Service: Gastroenterology   • ENDOSCOPY N/A 6/24/2020    Procedure: ESOPHAGOGASTRODUODENOSCOPY WED appt please;  Surgeon: Juláin Maldonado MD;  Location: [facility] ENDOSCOPY;  Service: Gastroenterology;  Laterality: N/A;   • ENDOSCOPY N/A 6/10/2022    Procedure: ESOPHAGOGASTRODUODENOSCOPY   room 380;  Surgeon: Jeremiah Wilkins MD;  Location: [facility] ENDOSCOPY;  Service:  Gastroenterology;  Laterality: N/A;   • ENDOSCOPY AND COLONOSCOPY     • FOOT SURGERY      Toes   • FOOT SURGERY     • GASTRIC BANDING      Revision, laparoscopic   • HYSTERECTOMY     • INCISION AND DRAINAGE LEG Left 3/12/2021    Procedure: Left ankle arthroscopic irrigation and debridement, screw removal;  Surgeon: Ignacio Lord DPM;  Location: [facility];  Service: Podiatry;  Laterality: Left;   • MOUTH SURGERY     • SALPINGO OOPHORECTOMY     • SHOULDER SURGERY     • SUBTALAR ARTHRODESIS Left 1/16/2019    Procedure: LEFT FOOT HARDWARE REMOVAL, FIFTH METATARSAL , OPEN REDUCTION INTERNAL FIXATION, CALCANEAL OSTEOTOMY;  Surgeon: Ignacio Lord DPM;  Location: [facility];  Service: Podiatry   • SUBTALAR ARTHRODESIS Left 10/16/2019    Procedure: foot hardware removal, subtalar joint fusion  possible de/reattachment of achilles tendon        (c-arm);  Surgeon: Ignacio Lord DPM;  Location: [facility];  Service: Podiatry   • SUBTALAR ARTHRODESIS Left 9/30/2020    Procedure: subtalar, talonavicular joint arthrodesis.  Removal hardware.          (c-arm);  Surgeon: Ignacio Lord DPM;  Location: [facility];  Service: Podiatry;  Laterality: Left;   • TRANSESOPHAGEAL ECHOCARDIOGRAM (LAMONTE)      With color flow      Social History     Socioeconomic History   • Marital status:    Tobacco Use   • Smoking status: Never Smoker   • Smokeless tobacco: Never Used   Vaping Use   • Vaping Use: Never used   Substance and Sexual Activity   • Alcohol use: No   • Drug use: No   • Sexual activity: Defer      The following portions of the patient's history were reviewed and updated as appropriate: allergies, current medications, past family history, past medical history, past social history, past surgical history and problem list.    Review of Systems   Constitutional: Negative.    HENT: Negative.  Negative for congestion and rhinorrhea.    Eyes: Negative.  Negative for blurred vision, double vision, photophobia and  visual disturbance.   Respiratory: Negative.  Negative for cough, shortness of breath, wheezing and stridor.    Cardiovascular: Negative.  Negative for chest pain, palpitations and leg swelling.   Gastrointestinal: Negative.  Negative for abdominal distention, abdominal pain, blood in stool, constipation, diarrhea, nausea, vomiting, GERD and indigestion.   Endocrine: Negative.  Negative for cold intolerance and heat intolerance.   Genitourinary: Negative.  Negative for dysuria, flank pain, frequency and urinary incontinence.   Musculoskeletal: Positive for arthralgias. Negative for back pain.        L AKA   Skin: Positive for rash (RLE. with thickening of skin right lateral lower leg, small mass sub skin level.). Negative for skin lesions.   Allergic/Immunologic: Negative.  Negative for immunocompromised state.   Neurological: Positive for numbness.   Hematological: Negative.    Psychiatric/Behavioral: Negative for agitation, behavioral problems, decreased concentration, dysphoric mood, hallucinations, self-injury, sleep disturbance, suicidal ideas, negative for hyperactivity, depressed mood and stress. The patient is nervous/anxious.    All other systems reviewed and are negative.    PHQ-9 Depression Screening  Little interest or pleasure in doing things?     Feeling down, depressed, or hopeless?     Trouble falling or staying asleep, or sleeping too much?     Feeling tired or having little energy?     Poor appetite or overeating?     Feeling bad about yourself - or that you are a failure or have let yourself or your family down?     Trouble concentrating on things, such as reading the newspaper or watching television?     Moving or speaking so slowly that other people could have noticed? Or the opposite - being so fidgety or restless that you have been moving around a lot more than usual?     Thoughts that you would be better off dead, or of hurting yourself in some way?     PHQ-9 Total Score     If you checked  off any problems, how difficult have these problems made it for you to do your work, take care of things at home, or get along with other people?      Patient understands the risks associated with this controlled medication, including tolerance and addiction.  Patient also agrees to only obtain this medication from me, and not from a another provider, unless that provider is covering for me in my absence.  Patient also agrees to be compliant in dosing, and not self adjust the dose of medication.  A signed controlled substance agreement is on file, and the patient has received a controlled substance education sheet at this a previous visit.  The patient has also signed a consent for treatment with a controlled substance as per [facility] policy. LESTER was obtained.    Objective   Physical Exam  Vitals and nursing note reviewed.   Constitutional:       General: She is not in acute distress.     Appearance: Normal appearance. She is well-developed. She is obese. She is not ill-appearing or diaphoretic.   HENT:      Head: Normocephalic and atraumatic.   Eyes:      General: No scleral icterus.        Right eye: No discharge.         Left eye: No discharge.      Conjunctiva/sclera: Conjunctivae normal.      Pupils: Pupils are equal, round, and reactive to light.   Neck:      Thyroid: No thyromegaly.      Vascular: No carotid bruit or JVD.      Trachea: No tracheal deviation.   Cardiovascular:      Rate and Rhythm: Normal rate and regular rhythm.      Pulses: Normal pulses.      Heart sounds: Normal heart sounds. No murmur heard.    No friction rub. No gallop.   Pulmonary:      Effort: Pulmonary effort is normal. No respiratory distress.      Breath sounds: Normal breath sounds. No stridor. No wheezing, rhonchi or rales.   Chest:      Chest wall: No tenderness.   Abdominal:      General: Bowel sounds are normal.      Palpations: Abdomen is soft.   Musculoskeletal:         General: Deformity (left AKA) present. No  "tenderness. Normal range of motion.      Cervical back: Normal range of motion and neck supple. No rigidity or tenderness.      Right lower leg: No edema.      Left lower leg: No edema.   Lymphadenopathy:      Cervical: No cervical adenopathy.   Skin:     General: Skin is warm and dry.      Capillary Refill: Capillary refill takes 2 to 3 seconds.      Coloration: Skin is not jaundiced or pale.      Findings: No bruising, erythema, lesion or rash.   Neurological:      General: No focal deficit present.      Mental Status: She is alert and oriented to person, place, and time. Mental status is at baseline.      Motor: No weakness.      Gait: Gait normal.   Psychiatric:         Mood and Affect: Mood normal.         Behavior: Behavior normal.         Thought Content: Thought content normal.         Judgment: Judgment normal.       Vitals:    06/27/22 0756   BP: 130/70   Pulse: 69   Resp: 16   Temp: 97.1 °F (36.2 °C)   SpO2: 98%   Weight: 118 kg (260 lb)   Height: 172.7 cm (68\")   PainSc:   5   PainLoc: Ear  Comment: both      Body mass index is 39.53 kg/m².  Mild elevation in blood pressure today medications adjusted as below.  Stable diabetic neuropathy with decreased dose and frequency of gabapentin as below.  Refill needed.  New diagnosis diabetic stroke paresis on gastric emptying study in hospital.  Reglan has improved this greatly as well as associated chronic nausea and now able to take truly as needed.  Cautioned as to side effects potential with long-term high-frequency use.  Stable well-controlled anxiety and depressive disorder felt to be well controlled by patient with decreased doses of Abilify and Effexor.  Doing well without Remeron though reports has no trouble getting to sleep initially using melatonin 3 mg prescribed at discharge but wakes after about 3 hours.  Advised may repeat this once or increase overall dose to 6 mg nightly.  CKD and anemia noted on most recent post discharge labs require " follow-up BMP and CBC today.  She is asymptomatic.    She did report bilateral otalgia over the past week or so and on exam is found to have acute suppurative OM of both ears without spontaneous rupture of the TMs.    Antibiotic as prescribed for this.      Assessment & Plan   Diagnoses and all orders for this visit:    1. Essential hypertension (Primary)  Comments:  today mild elevation, has been higher at home since discharge. increase amlodipine to 5mg today. continue Toprol and Imdur  Orders:  -     amLODIPine (NORVASC) 5 MG tablet; Take 1 tablet by mouth Daily.  Dispense: 90 tablet; Refill: 1    2. Neuropathy  Comments:  stable. continue gabapentin 100mg bid  Orders:  -     gabapentin (NEURONTIN) 100 MG capsule; Take 1 capsule by mouth Every 12 (Twelve) Hours.  Dispense: 60 capsule; Refill: 2    3. Diabetic gastroparesis (HCC)  Comments:  new dx in hospital 6/10/22. continue reglan on prn basis for nausea, not scheduled.  Orders:  -     metoclopramide (REGLAN) 10 MG tablet; Take 1 tablet by mouth 4 (Four) Times a Day As Needed (nausea).  Dispense: 120 tablet; Refill: 1    4. Chronic nausea  Comments:  continue reglan prn, much improved, no longer requring QID, but truly only when needed. refill today.   Orders:  -     metoclopramide (REGLAN) 10 MG tablet; Take 1 tablet by mouth 4 (Four) Times a Day As Needed (nausea).  Dispense: 120 tablet; Refill: 1    5. Depression, major, recurrent, moderate (HCC)  Comments:  stable iwth abilify at 5mg.  Orders:  -     venlafaxine XR (EFFEXOR-XR) 75 MG 24 hr capsule; Take 1 capsule by mouth Daily With Breakfast.  Dispense: 90 capsule; Refill: 1  -     ARIPiprazole (ABILIFY) 5 MG tablet; Take 1 tablet by mouth Daily.  Dispense: 90 tablet; Refill: 1    6. Generalized anxiety disorder  Comments:  stable with abilify at 5mg.   Orders:  -     venlafaxine XR (EFFEXOR-XR) 75 MG 24 hr capsule; Take 1 capsule by mouth Daily With Breakfast.  Dispense: 90 capsule; Refill: 1  -      ARIPiprazole (ABILIFY) 5 MG tablet; Take 1 tablet by mouth Daily.  Dispense: 90 tablet; Refill: 1    7. Type 2 diabetes mellitus with diabetic autonomic neuropathy, with long-term current use of insulin ([facility])  Comments:  managed by Ms Lawson, endocrinology, [facility].   Orders:  -     gabapentin (NEURONTIN) 100 MG capsule; Take 1 capsule by mouth Every 12 (Twelve) Hours.  Dispense: 60 capsule; Refill: 2    8. Acute suppurative otitis media of both ears without spontaneous rupture of tympanic membranes, recurrence not specified  Comments:  bilateral Otalgia past 5-7 days. red TMs with purulent dependent rim behind TM. azithromycin Rx today.   Orders:  -     azithromycin (ZITHROMAX) 250 MG tablet; Take 2 tablets the first day, then 1 tablet daily for 4 days.  Dispense: 6 tablet; Refill: 0    9. CKD (chronic kidney disease), symptom management only, stage 3 (moderate) ([facility])  Comments:  improved on 6/16/22 labs, repeat BMP today.   Orders:  -     Basic Metabolic Panel    10. Anemia, unspecified type  Comments:  probable dilutional anemia noted on 6/16/ labs with Hbg 11.0 repeat CBC today.   Orders:  -     CBC w AUTO Differential; Future    11. Hospital discharge follow-up  Comments:  no chest pain, right knee pain/ swelling since discharge. no confusion. feels much better overall.    12. Other insomnia  -     melatonin 3 MG tablet; Take 2 tablets by mouth Every Night. May take 1 if this works, or may take 2 as single dose if more effective.  Dispense: 60 tablet; Refill: 5    13. Vitamin D deficiency  -     vitamin D (ERGOCALCIFEROL) 1.25 MG (60410 UT) capsule capsule; Take 1 capsule by mouth Every 7 (Seven) Days.  Dispense: 36 capsule; Refill: 1               Return in about 3 months (around 9/27/2022).  There are no Patient Instructions on file for this visit.        This document has been electronically signed by BEBE Palomino on June 27, 2022 13:16 CDT

## 2022-06-29 ENCOUNTER — HOME CARE VISIT (OUTPATIENT)
Dept: HOME HEALTH SERVICES | Facility: CLINIC | Age: 62
End: 2022-06-29

## 2022-06-29 PROCEDURE — G0157 HHC PT ASSISTANT EA 15: HCPCS

## 2022-06-29 RX ORDER — RANOLAZINE 500 MG/1
500 TABLET, EXTENDED RELEASE ORAL EVERY 12 HOURS SCHEDULED
Qty: 180 TABLET | Refills: 3 | Status: SHIPPED | OUTPATIENT
Start: 2022-06-29

## 2022-06-30 ENCOUNTER — HOME CARE VISIT (OUTPATIENT)
Dept: HOME HEALTH SERVICES | Facility: CLINIC | Age: 62
End: 2022-06-30

## 2022-06-30 VITALS
RESPIRATION RATE: 18 BRPM | DIASTOLIC BLOOD PRESSURE: 90 MMHG | OXYGEN SATURATION: 96 % | HEART RATE: 69 BPM | SYSTOLIC BLOOD PRESSURE: 160 MMHG | TEMPERATURE: 97.6 F

## 2022-06-30 VITALS
OXYGEN SATURATION: 97 % | TEMPERATURE: 97.7 F | RESPIRATION RATE: 18 BRPM | SYSTOLIC BLOOD PRESSURE: 128 MMHG | HEART RATE: 65 BPM | DIASTOLIC BLOOD PRESSURE: 74 MMHG

## 2022-06-30 PROCEDURE — G0493 RN CARE EA 15 MIN HH/HOSPICE: HCPCS

## 2022-07-01 ENCOUNTER — HOME CARE VISIT (OUTPATIENT)
Dept: HOME HEALTH SERVICES | Facility: CLINIC | Age: 62
End: 2022-07-01

## 2022-07-01 PROCEDURE — G0157 HHC PT ASSISTANT EA 15: HCPCS

## 2022-07-03 PROCEDURE — 99284 EMERGENCY DEPT VISIT MOD MDM: CPT

## 2022-07-04 ENCOUNTER — APPOINTMENT (OUTPATIENT)
Dept: GENERAL RADIOLOGY | Facility: HOSPITAL | Age: 62
End: 2022-07-04

## 2022-07-04 ENCOUNTER — HOSPITAL ENCOUNTER (OUTPATIENT)
Facility: HOSPITAL | Age: 62
Setting detail: OBSERVATION
Discharge: HOME | End: 2022-07-07
Attending: EMERGENCY MEDICINE | Admitting: INTERNAL MEDICINE

## 2022-07-04 VITALS
HEART RATE: 65 BPM | OXYGEN SATURATION: 95 % | SYSTOLIC BLOOD PRESSURE: 140 MMHG | RESPIRATION RATE: 18 BRPM | DIASTOLIC BLOOD PRESSURE: 82 MMHG | TEMPERATURE: 96.8 F

## 2022-07-04 DIAGNOSIS — G54.6 PHANTOM PAIN AFTER AMPUTATION OF LOWER EXTREMITY: Chronic | ICD-10-CM

## 2022-07-04 DIAGNOSIS — M54.41 CHRONIC RIGHT-SIDED LOW BACK PAIN WITH RIGHT-SIDED SCIATICA: ICD-10-CM

## 2022-07-04 DIAGNOSIS — G89.29 CHRONIC RIGHT-SIDED LOW BACK PAIN WITH RIGHT-SIDED SCIATICA: ICD-10-CM

## 2022-07-04 DIAGNOSIS — Z74.09 IMPAIRED PHYSICAL MOBILITY: ICD-10-CM

## 2022-07-04 DIAGNOSIS — R07.9 CHEST PAIN, UNSPECIFIED TYPE: Primary | ICD-10-CM

## 2022-07-04 LAB
ALBUMIN SERPL-MCNC: 3.7 G/DL (ref 3.5–5.2)
ALBUMIN/GLOB SERPL: 1.4 G/DL
ALP SERPL-CCNC: 98 U/L (ref 39–117)
ALT SERPL W P-5'-P-CCNC: 12 U/L (ref 1–33)
ANION GAP SERPL CALCULATED.3IONS-SCNC: 11 MMOL/L (ref 5–15)
AST SERPL-CCNC: 12 U/L (ref 1–32)
BASOPHILS # BLD AUTO: 0.06 10*3/MM3 (ref 0–0.2)
BASOPHILS NFR BLD AUTO: 0.5 % (ref 0–1.5)
BILIRUB SERPL-MCNC: 0.4 MG/DL (ref 0–1.2)
BUN SERPL-MCNC: 6 MG/DL (ref 8–23)
BUN/CREAT SERPL: 7.5 (ref 7–25)
CALCIUM SPEC-SCNC: 8.7 MG/DL (ref 8.6–10.5)
CHLORIDE SERPL-SCNC: 106 MMOL/L (ref 98–107)
CO2 SERPL-SCNC: 26 MMOL/L (ref 22–29)
CREAT SERPL-MCNC: 0.8 MG/DL (ref 0.57–1)
DEPRECATED RDW RBC AUTO: 41.8 FL (ref 37–54)
EGFRCR SERPLBLD CKD-EPI 2021: 84.5 ML/MIN/1.73
EOSINOPHIL # BLD AUTO: 0.45 10*3/MM3 (ref 0–0.4)
EOSINOPHIL NFR BLD AUTO: 3.6 % (ref 0.3–6.2)
ERYTHROCYTE [DISTWIDTH] IN BLOOD BY AUTOMATED COUNT: 13.5 % (ref 12.3–15.4)
FLUAV RNA RESP QL NAA+PROBE: NOT DETECTED
FLUBV RNA RESP QL NAA+PROBE: NOT DETECTED
GLOBULIN UR ELPH-MCNC: 2.6 GM/DL
GLUCOSE BLDC GLUCOMTR-MCNC: 291 MG/DL (ref 70–130)
GLUCOSE SERPL-MCNC: 137 MG/DL (ref 65–99)
HCT VFR BLD AUTO: 34.1 % (ref 34–46.6)
HGB BLD-MCNC: 11.7 G/DL (ref 12–15.9)
HOLD SPECIMEN: NORMAL
IMM GRANULOCYTES # BLD AUTO: 0.06 10*3/MM3 (ref 0–0.05)
IMM GRANULOCYTES NFR BLD AUTO: 0.5 % (ref 0–0.5)
LIPASE SERPL-CCNC: 8 U/L (ref 13–60)
LYMPHOCYTES # BLD AUTO: 3.03 10*3/MM3 (ref 0.7–3.1)
LYMPHOCYTES NFR BLD AUTO: 24.2 % (ref 19.6–45.3)
MCH RBC QN AUTO: 29.9 PG (ref 26.6–33)
MCHC RBC AUTO-ENTMCNC: 34.3 G/DL (ref 31.5–35.7)
MCV RBC AUTO: 87.2 FL (ref 79–97)
MONOCYTES # BLD AUTO: 1.05 10*3/MM3 (ref 0.1–0.9)
MONOCYTES NFR BLD AUTO: 8.4 % (ref 5–12)
NEUTROPHILS NFR BLD AUTO: 62.8 % (ref 42.7–76)
NEUTROPHILS NFR BLD AUTO: 7.85 10*3/MM3 (ref 1.7–7)
NRBC BLD AUTO-RTO: 0 /100 WBC (ref 0–0.2)
NT-PROBNP SERPL-MCNC: 1273 PG/ML (ref 0–900)
PLATELET # BLD AUTO: 427 10*3/MM3 (ref 140–450)
PMV BLD AUTO: 9.4 FL (ref 6–12)
POTASSIUM SERPL-SCNC: 3.1 MMOL/L (ref 3.5–5.2)
PROT SERPL-MCNC: 6.3 G/DL (ref 6–8.5)
RBC # BLD AUTO: 3.91 10*6/MM3 (ref 3.77–5.28)
SARS-COV-2 RNA RESP QL NAA+PROBE: NOT DETECTED
SODIUM SERPL-SCNC: 143 MMOL/L (ref 136–145)
TROPONIN T SERPL-MCNC: <0.01 NG/ML (ref 0–0.03)
WBC NRBC COR # BLD: 12.5 10*3/MM3 (ref 3.4–10.8)
WHOLE BLOOD HOLD SPECIMEN: NORMAL

## 2022-07-04 PROCEDURE — C9803 HOPD COVID-19 SPEC COLLECT: HCPCS

## 2022-07-04 PROCEDURE — 25010000002 HYDROMORPHONE 1 MG/ML SOLUTION: Performed by: EMERGENCY MEDICINE

## 2022-07-04 PROCEDURE — G0378 HOSPITAL OBSERVATION PER HR: HCPCS

## 2022-07-04 PROCEDURE — 82962 GLUCOSE BLOOD TEST: CPT

## 2022-07-04 PROCEDURE — 63710000001 INSULIN DETEMIR PER 5 UNITS: Performed by: INTERNAL MEDICINE

## 2022-07-04 PROCEDURE — 87636 SARSCOV2 & INF A&B AMP PRB: CPT | Performed by: EMERGENCY MEDICINE

## 2022-07-04 PROCEDURE — 71045 X-RAY EXAM CHEST 1 VIEW: CPT

## 2022-07-04 PROCEDURE — 96374 THER/PROPH/DIAG INJ IV PUSH: CPT

## 2022-07-04 PROCEDURE — 84484 ASSAY OF TROPONIN QUANT: CPT | Performed by: INTERNAL MEDICINE

## 2022-07-04 PROCEDURE — 96375 TX/PRO/DX INJ NEW DRUG ADDON: CPT

## 2022-07-04 PROCEDURE — 85025 COMPLETE CBC W/AUTO DIFF WBC: CPT | Performed by: EMERGENCY MEDICINE

## 2022-07-04 PROCEDURE — 99214 OFFICE O/P EST MOD 30 MIN: CPT | Performed by: INTERNAL MEDICINE

## 2022-07-04 PROCEDURE — 93005 ELECTROCARDIOGRAM TRACING: CPT | Performed by: EMERGENCY MEDICINE

## 2022-07-04 PROCEDURE — 36415 COLL VENOUS BLD VENIPUNCTURE: CPT | Performed by: INTERNAL MEDICINE

## 2022-07-04 PROCEDURE — 93005 ELECTROCARDIOGRAM TRACING: CPT | Performed by: INTERNAL MEDICINE

## 2022-07-04 PROCEDURE — 25010000002 FUROSEMIDE PER 20 MG: Performed by: EMERGENCY MEDICINE

## 2022-07-04 PROCEDURE — 80053 COMPREHEN METABOLIC PANEL: CPT | Performed by: EMERGENCY MEDICINE

## 2022-07-04 PROCEDURE — 84484 ASSAY OF TROPONIN QUANT: CPT | Performed by: EMERGENCY MEDICINE

## 2022-07-04 PROCEDURE — 83690 ASSAY OF LIPASE: CPT | Performed by: EMERGENCY MEDICINE

## 2022-07-04 PROCEDURE — 83880 ASSAY OF NATRIURETIC PEPTIDE: CPT | Performed by: NURSE PRACTITIONER

## 2022-07-04 RX ORDER — ONDANSETRON 2 MG/ML
4 INJECTION INTRAMUSCULAR; INTRAVENOUS EVERY 6 HOURS PRN
Status: DISCONTINUED | OUTPATIENT
Start: 2022-07-04 | End: 2022-07-07 | Stop reason: HOSPADM

## 2022-07-04 RX ORDER — AMLODIPINE BESYLATE 5 MG/1
5 TABLET ORAL DAILY
Status: DISCONTINUED | OUTPATIENT
Start: 2022-07-04 | End: 2022-07-07 | Stop reason: HOSPADM

## 2022-07-04 RX ORDER — SUCRALFATE 1 G/1
1 TABLET ORAL
Status: DISCONTINUED | OUTPATIENT
Start: 2022-07-04 | End: 2022-07-07 | Stop reason: HOSPADM

## 2022-07-04 RX ORDER — ARIPIPRAZOLE 5 MG/1
5 TABLET ORAL DAILY
Status: DISCONTINUED | OUTPATIENT
Start: 2022-07-04 | End: 2022-07-04

## 2022-07-04 RX ORDER — NITROGLYCERIN 0.4 MG/1
0.4 TABLET SUBLINGUAL
Status: DISCONTINUED | OUTPATIENT
Start: 2022-07-04 | End: 2022-07-04 | Stop reason: SDUPTHER

## 2022-07-04 RX ORDER — NICOTINE POLACRILEX 4 MG
15 LOZENGE BUCCAL
Status: DISCONTINUED | OUTPATIENT
Start: 2022-07-04 | End: 2022-07-07 | Stop reason: HOSPADM

## 2022-07-04 RX ORDER — GABAPENTIN 100 MG/1
100 CAPSULE ORAL EVERY 12 HOURS SCHEDULED
Status: DISCONTINUED | OUTPATIENT
Start: 2022-07-04 | End: 2022-07-07 | Stop reason: HOSPADM

## 2022-07-04 RX ORDER — METOPROLOL SUCCINATE 50 MG/1
50 TABLET, EXTENDED RELEASE ORAL DAILY
Status: DISCONTINUED | OUTPATIENT
Start: 2022-07-04 | End: 2022-07-07 | Stop reason: HOSPADM

## 2022-07-04 RX ORDER — DEXTROSE MONOHYDRATE 25 G/50ML
25 INJECTION, SOLUTION INTRAVENOUS
Status: DISCONTINUED | OUTPATIENT
Start: 2022-07-04 | End: 2022-07-07 | Stop reason: HOSPADM

## 2022-07-04 RX ORDER — SODIUM CHLORIDE 0.9 % (FLUSH) 0.9 %
10 SYRINGE (ML) INJECTION EVERY 12 HOURS SCHEDULED
Status: DISCONTINUED | OUTPATIENT
Start: 2022-07-04 | End: 2022-07-07 | Stop reason: HOSPADM

## 2022-07-04 RX ORDER — METOCLOPRAMIDE 10 MG/1
10 TABLET ORAL 4 TIMES DAILY PRN
Status: DISCONTINUED | OUTPATIENT
Start: 2022-07-04 | End: 2022-07-07 | Stop reason: HOSPADM

## 2022-07-04 RX ORDER — POTASSIUM CHLORIDE 750 MG/1
40 CAPSULE, EXTENDED RELEASE ORAL ONCE
Status: COMPLETED | OUTPATIENT
Start: 2022-07-04 | End: 2022-07-04

## 2022-07-04 RX ORDER — POTASSIUM CHLORIDE 750 MG/1
40 CAPSULE, EXTENDED RELEASE ORAL AS NEEDED
Status: DISCONTINUED | OUTPATIENT
Start: 2022-07-04 | End: 2022-07-07 | Stop reason: HOSPADM

## 2022-07-04 RX ORDER — INSULIN ASPART 100 [IU]/ML
0-14 INJECTION, SOLUTION INTRAVENOUS; SUBCUTANEOUS
Status: DISCONTINUED | OUTPATIENT
Start: 2022-07-05 | End: 2022-07-07 | Stop reason: HOSPADM

## 2022-07-04 RX ORDER — PANTOPRAZOLE SODIUM 40 MG/1
40 TABLET, DELAYED RELEASE ORAL DAILY
Status: DISCONTINUED | OUTPATIENT
Start: 2022-07-04 | End: 2022-07-07 | Stop reason: HOSPADM

## 2022-07-04 RX ORDER — ACETAMINOPHEN 325 MG/1
650 TABLET ORAL EVERY 6 HOURS PRN
Status: DISCONTINUED | OUTPATIENT
Start: 2022-07-04 | End: 2022-07-07 | Stop reason: HOSPADM

## 2022-07-04 RX ORDER — ATORVASTATIN CALCIUM 40 MG/1
80 TABLET, FILM COATED ORAL DAILY
Status: DISCONTINUED | OUTPATIENT
Start: 2022-07-04 | End: 2022-07-07 | Stop reason: HOSPADM

## 2022-07-04 RX ORDER — FOLIC ACID 1 MG/1
1000 TABLET ORAL DAILY
Status: DISCONTINUED | OUTPATIENT
Start: 2022-07-04 | End: 2022-07-07 | Stop reason: HOSPADM

## 2022-07-04 RX ORDER — POTASSIUM CHLORIDE 1.5 G/1.77G
40 POWDER, FOR SOLUTION ORAL AS NEEDED
Status: DISCONTINUED | OUTPATIENT
Start: 2022-07-04 | End: 2022-07-07 | Stop reason: HOSPADM

## 2022-07-04 RX ORDER — VENLAFAXINE HYDROCHLORIDE 75 MG/1
75 CAPSULE, EXTENDED RELEASE ORAL
Status: DISCONTINUED | OUTPATIENT
Start: 2022-07-04 | End: 2022-07-04

## 2022-07-04 RX ORDER — ASPIRIN 325 MG
325 TABLET, DELAYED RELEASE (ENTERIC COATED) ORAL DAILY
Status: DISCONTINUED | OUTPATIENT
Start: 2022-07-04 | End: 2022-07-07 | Stop reason: HOSPADM

## 2022-07-04 RX ORDER — RANOLAZINE 500 MG/1
500 TABLET, EXTENDED RELEASE ORAL EVERY 12 HOURS SCHEDULED
Status: DISCONTINUED | OUTPATIENT
Start: 2022-07-04 | End: 2022-07-07 | Stop reason: HOSPADM

## 2022-07-04 RX ORDER — FUROSEMIDE 10 MG/ML
40 INJECTION INTRAMUSCULAR; INTRAVENOUS ONCE
Status: COMPLETED | OUTPATIENT
Start: 2022-07-04 | End: 2022-07-04

## 2022-07-04 RX ORDER — CLOPIDOGREL BISULFATE 75 MG/1
75 TABLET ORAL DAILY
Status: DISCONTINUED | OUTPATIENT
Start: 2022-07-04 | End: 2022-07-07 | Stop reason: HOSPADM

## 2022-07-04 RX ORDER — NITROGLYCERIN 0.4 MG/1
0.4 TABLET SUBLINGUAL
Status: DISCONTINUED | OUTPATIENT
Start: 2022-07-04 | End: 2022-07-07 | Stop reason: HOSPADM

## 2022-07-04 RX ORDER — ISOSORBIDE MONONITRATE 30 MG/1
30 TABLET, EXTENDED RELEASE ORAL
Status: DISCONTINUED | OUTPATIENT
Start: 2022-07-04 | End: 2022-07-07 | Stop reason: HOSPADM

## 2022-07-04 RX ORDER — POTASSIUM CHLORIDE 7.45 MG/ML
10 INJECTION INTRAVENOUS
Status: DISCONTINUED | OUTPATIENT
Start: 2022-07-04 | End: 2022-07-07 | Stop reason: HOSPADM

## 2022-07-04 RX ORDER — ASPIRIN 81 MG/1
324 TABLET, CHEWABLE ORAL ONCE
Status: COMPLETED | OUTPATIENT
Start: 2022-07-04 | End: 2022-07-04

## 2022-07-04 RX ORDER — ENOXAPARIN SODIUM 100 MG/ML
40 INJECTION SUBCUTANEOUS EVERY 24 HOURS
Status: DISCONTINUED | OUTPATIENT
Start: 2022-07-05 | End: 2022-07-07 | Stop reason: HOSPADM

## 2022-07-04 RX ORDER — SODIUM CHLORIDE 0.9 % (FLUSH) 0.9 %
10 SYRINGE (ML) INJECTION AS NEEDED
Status: DISCONTINUED | OUTPATIENT
Start: 2022-07-04 | End: 2022-07-07 | Stop reason: HOSPADM

## 2022-07-04 RX ADMIN — GABAPENTIN 100 MG: 100 CAPSULE ORAL at 20:45

## 2022-07-04 RX ADMIN — ASPIRIN 324 MG: 81 TABLET, CHEWABLE ORAL at 01:01

## 2022-07-04 RX ADMIN — ATORVASTATIN CALCIUM 80 MG: 40 TABLET, FILM COATED ORAL at 09:18

## 2022-07-04 RX ADMIN — FUROSEMIDE 40 MG: 10 INJECTION, SOLUTION INTRAVENOUS at 00:59

## 2022-07-04 RX ADMIN — ACETAMINOPHEN 650 MG: 325 TABLET, FILM COATED ORAL at 09:28

## 2022-07-04 RX ADMIN — ISOSORBIDE MONONITRATE 30 MG: 30 TABLET, EXTENDED RELEASE ORAL at 09:18

## 2022-07-04 RX ADMIN — AMLODIPINE BESYLATE 5 MG: 5 TABLET ORAL at 09:19

## 2022-07-04 RX ADMIN — Medication 10 ML: at 09:21

## 2022-07-04 RX ADMIN — SUCRALFATE 1 G: 1 TABLET ORAL at 06:19

## 2022-07-04 RX ADMIN — POTASSIUM CHLORIDE 40 MEQ: 750 CAPSULE, EXTENDED RELEASE ORAL at 10:54

## 2022-07-04 RX ADMIN — SUCRALFATE 1 G: 1 TABLET ORAL at 20:45

## 2022-07-04 RX ADMIN — POTASSIUM CHLORIDE 40 MEQ: 750 CAPSULE, EXTENDED RELEASE ORAL at 22:19

## 2022-07-04 RX ADMIN — FOLIC ACID 1000 MCG: 1 TABLET ORAL at 09:19

## 2022-07-04 RX ADMIN — NITROGLYCERIN 0.4 MG: 0.4 TABLET, ORALLY DISINTEGRATING SUBLINGUAL at 02:49

## 2022-07-04 RX ADMIN — METOCLOPRAMIDE 10 MG: 10 TABLET ORAL at 15:26

## 2022-07-04 RX ADMIN — SUCRALFATE 1 G: 1 TABLET ORAL at 17:12

## 2022-07-04 RX ADMIN — CLOPIDOGREL BISULFATE 75 MG: 75 TABLET ORAL at 09:19

## 2022-07-04 RX ADMIN — METOPROLOL SUCCINATE 50 MG: 50 TABLET, EXTENDED RELEASE ORAL at 09:20

## 2022-07-04 RX ADMIN — INSULIN DETEMIR 25 UNITS: 100 INJECTION, SOLUTION SUBCUTANEOUS at 20:53

## 2022-07-04 RX ADMIN — POTASSIUM CHLORIDE 40 MEQ: 750 CAPSULE, EXTENDED RELEASE ORAL at 03:29

## 2022-07-04 RX ADMIN — GABAPENTIN 100 MG: 100 CAPSULE ORAL at 09:19

## 2022-07-04 RX ADMIN — RANOLAZINE 500 MG: 500 TABLET, FILM COATED, EXTENDED RELEASE ORAL at 20:45

## 2022-07-04 RX ADMIN — PANTOPRAZOLE SODIUM 40 MG: 40 TABLET, DELAYED RELEASE ORAL at 09:19

## 2022-07-04 RX ADMIN — RANOLAZINE 500 MG: 500 TABLET, FILM COATED, EXTENDED RELEASE ORAL at 09:19

## 2022-07-04 RX ADMIN — ASPIRIN 325 MG: 325 TABLET, COATED ORAL at 09:19

## 2022-07-04 RX ADMIN — Medication 10 ML: at 22:20

## 2022-07-04 RX ADMIN — POTASSIUM CHLORIDE 40 MEQ: 750 CAPSULE, EXTENDED RELEASE ORAL at 15:26

## 2022-07-04 RX ADMIN — SUCRALFATE 1 G: 1 TABLET ORAL at 10:54

## 2022-07-04 RX ADMIN — HYDROMORPHONE HYDROCHLORIDE 0.5 MG: 1 INJECTION, SOLUTION INTRAMUSCULAR; INTRAVENOUS; SUBCUTANEOUS at 04:34

## 2022-07-04 NOTE — CONSULTS
"Adult Nutrition  Assessment    Patient Name:  Ariana Martinez  YOB: 1960  MRN: [id number]  Admit Date:  7/4/2022    Assessment Date:  7/4/2022    Comments:  Pt admitted with Chest pain of ? Etiology.  She recently had a Left Heart cath with no significant findings and GI assessment with an EGD with noted Gastritis; Esophageal Ulcer; and gastroparesis.  She reports no known wt loss and her wt hx shows fluctuations with hx of CHF.  Will add milk with meals and monitor po intake and POC     Reason for Assessment     Row Name 07/04/22 1542          Reason for Assessment    Reason For Assessment nurse/nurse practitioner consult     Diagnosis cardiac disease     Identified At Risk by Screening Criteria other (see comments)  Decreased appetite                  Anthropometrics     Row Name 07/04/22 1544          Anthropometrics    Height for Calculation 1.727 m (5' 8\")     Weight for Calculation 63.5 kg (140 lb)                Labs/Tests/Procedures/Meds     Row Name 07/04/22 1542          Labs/Procedures/Meds    Lab Results Reviewed reviewed, pertinent            Diagnostic Tests/Procedures    Diagnostic Test/Procedure Reviewed reviewed, pertinent            Medications    Pertinent Medications Reviewed reviewed, pertinent                  Estimated/Assessed Needs - Anthropometrics     Row Name 07/04/22 1544          Anthropometrics    Height for Calculation 1.727 m (5' 8\")     Weight for Calculation 63.5 kg (140 lb)            Estimated/Assessed Needs    Additional Documentation Additional Requirements (Group);Fluid Requirements (Group);Modified [place] State Equation (Group);Estimated Calorie Needs (Group);KCAL/KG (Group);[place]-St. Jeor Equation (Group);Protein Requirements (Group)            Estimated Calorie Needs    Estimated Calorie Need Method kcal/kg            KCAL/KG    KCAL/KG 25 Kcal/Kg (kcal)     25 Kcal/Kg (kcal) 1587.6            [place]-St. Jeor Equation    RMR ([place]-St. Jeor Equation) " 1253.54            Protein Requirements    Weight Used For Protein Calculations 63.5 kg (140 lb)     Est Protein Requirement Amount (gms/kg) 1.2 gm protein     Estimated Protein Requirements (gms/day) 76.2            Fluid Requirements    Fluid Requirements (mL/day) 1600     Estimated Fluid Requirement Method RDA Method     RDA Method (mL) 1600                Nutrition Prescription Ordered     Row Name 07/04/22 1545          Nutrition Prescription PO    Current PO Diet Regular     Common Modifiers Consistent Carbohydrate                Evaluation of Received Nutrient/Fluid Intake     Row Name 07/04/22 1545          PO Evaluation    Number of Days PO Intake Evaluated Insufficient Data     Number of Meals 2     % PO Intake 75%                     Electronically signed by:  Lisa French RD  07/04/22 15:48 CDT

## 2022-07-04 NOTE — PLAN OF CARE
Goal Outcome Evaluation:  Plan of Care Reviewed With: caregiver, patient        Progress: no change  Outcome Evaluation: New ASsessment.  Pt reports a decreased appetite with nausea for the past 2 weeks.  No known wt loss.  Will add milk with meals.  Hx of a recent GI follow up. Will monitor.

## 2022-07-04 NOTE — H&P
[facility] Medicine Admission      Date of Admission: 7/4/2022      Primary Care Physician: Kimberly Ferguson APRN      Chief Complaint: Chest pain    HPI:  Patient is a morbidly obese 62-year-old female with BMI of 38 and frequent recent hospitalization, status post left heart catheterization in April 2022 by Dr. Navas, and status post hospitalization in June 2022 for chest pain and shortness of breath.  At that time patient was evaluated by Dr. Navas with recommendation for medical management, further patient was seen by gastroenterologist Dr. Cody and underwent EGD, which showed gastritis, esophageal ulcer and retained food in the stomach.  She had gastric emptying study at that time which showed gastroparesis, she had a negative VQ scan at that time, as history of coronary artery disease, CABG, congestive heart failure he, diabetes mellitus type 2 insulin-dependent, diabetic polyneuropathy, hypertension hyperlipidemia, depression,.  Patient presented to ER tonight with complaint of chest pain.  Work-up in ED was negative.  Hospitalist service was called for observation of the patient concerning her heart score.  Patient was seen and examined in ED room 20.  Her  Nadeem at bedside.  Patient reports last night she was laying down when she started having mediastinal and later left-sided moderate in severity dull chest pain her pain persisted throughout ED and has been 6-hour 10.  Her pain did not response to nitro.  Her pain was associated with nausea. Her pain does not radiate and is not associated with any other complaint including no syncope near syncope palpitation, shortness of breath vomiting.  The pain does not radiate.  She denies any fall injury trauma fever chills headache sore throat cough congestion shortness of breath syncope near syncope palpitation abdominal pain back pain URI UTI-like symptoms, bleeding in particular.  Patient reports she  follows with cardiologist Dr. Gibson.    Concurrent Medical History:  has a past medical history of Acute bacterial endocarditis (3/13/2021), Angina, class IV ([facility]), Anxiety, Anxiety and depression, Arthritis, Benign paroxysmal positional vertigo, Bladder disorder, Carpal tunnel syndrome, CHF (congestive heart failure) ([facility]), Chronic pain, Coronary atherosclerosis, Depression, Diabetes mellitus ([facility]), Diabetic polyneuropathy ([facility]), Disease of thyroid gland, Elevated cholesterol, Female stress incontinence, Foot pain, left, Full dentures, Gastroparesis, GERD (gastroesophageal reflux disease), Hyperlipidemia, Hypertension, Low back pain, Malaise and fatigue, Multiple joint pain, Obesity, Occlusion and stenosis of bilateral carotid arteries (6/18/2021), Otalgia, Perforation of tympanic membrane, Postoperative wound infection, Vitamin D deficiency, and Wears glasses.    Past Surgical History:  has a past surgical history that includes Abdominal surgery; Angioplasty; Coronary artery bypass graft (2005); Cardiac catheterization; Carpal tunnel release; Cholecystectomy; endoscopy and colonoscopy; Mouth surgery; transesophageal echocardiogram ([place]); Foot surgery; gastric banding; Hysterectomy; Shoulder surgery; Salpingoophorectomy; Cardiac catheterization (N/A, 6/20/2017); Breast biopsy (Right); Esophagogastroduodenoscopy (N/A, 10/19/2018); subtalar arthrodesis (Left, 1/16/2019); subtalar arthrodesis (Left, 10/16/2019); Foot surgery; Cardiac catheterization (N/A, 2/18/2020); Esophagogastroduodenoscopy (N/A, 6/24/2020); Colonoscopy (N/A, 6/24/2020); subtalar arthrodesis (Left, 9/30/2020); Ankle Arthroscopy (Left, 2/26/2021); incision and drainage leg (Left, 3/12/2021); Foot Amputation; Cardiac catheterization (N/A, 4/6/2022); and Esophagogastroduodenoscopy (N/A, 6/10/2022).    Family History: family history includes Diabetes in her sister, sister, sister, sister, sister, sister and another family member; Heart disease in  her mother, sister, sister, and another family member; Hypertension in her mother, sister, sister, and another family member; Stroke in her mother.     Social History:  reports that she has never smoked. She has never used smokeless tobacco. She reports that she does not drink alcohol and does not use drugs.    Allergies:   Allergies   Allergen Reactions    Seroquel [Quetiapine] Anaphylaxis    Avandia [Rosiglitazone] Swelling    Morphine And Related Hallucinations    Oxycodone Hallucinations       Medications:   Prior to Admission medications    Medication Sig Start Date End Date Taking? Authorizing Provider   amLODIPine (NORVASC) 5 MG tablet Take 1 tablet by mouth Daily. 6/27/22   Kimberly Ferguson APRN   ARIPiprazole (ABILIFY) 5 MG tablet Take 1 tablet by mouth Daily. 6/27/22   Kimberly Ferguson APRN   aspirin  MG tablet Take 1 tablet by mouth Daily. 2/8/22   Mahin Esteves MD   atorvastatin (LIPITOR) 80 MG tablet TAKE 1 TABLET BY MOUTH EVERY DAY 4/26/22   Kimberly Ferguson APRN   azithromycin (ZITHROMAX) 250 MG tablet Take 2 tablets the first day, then 1 tablet daily for 4 days. 6/27/22   Kimberly Ferguson APRN   BD SHARPS CONTAINER HOME misc 1 each Take As Directed. 9/7/18   Francisca Fong MD   Blood Glucose Monitoring Suppl (ONE TOUCH ULTRA MINI) w/Device kit Patient will need the Accu-Check Avitio meter 10/18/21   Kimberly Ferguson APRN   Calcium Citrate-Vitamin D 250-200 MG-UNIT tablet Take 2 tablets by mouth 2 (two) times a day.    Provider, MD Esther   clopidogrel (PLAVIX) 75 MG tablet Take 1 tablet by mouth Daily. 2/28/22   Kimberly Ferguson APRN   cyanocobalamin 1000 MCG/ML injection INJECT 1 ML INTO THE APPROPRIATE MUSCLE AS DIRECTED BY PRESCRIBER EVERY 28 (TWENTY-EIGHT) DAYS. 4/13/22   Kimberly Ferguson APRN   folic acid (FOLVITE) 1 MG tablet TAKE 1 TABLET BY MOUTH EVERY DAY 3/24/22   Teressa Hammond APRN   gabapentin (NEURONTIN) 100 MG capsule Take 1 capsule by mouth  Every 12 (Twelve) Hours. 6/27/22   Kimberly Ferguson APRN   glucose blood (Accu-Chek Guide) test strip 1 each by Other route 4 (Four) Times a Day. To test blood sugar 4X daily. For  Dx E11.65 5/2/22   Kimberly Ferguson APRN   glucose monitor monitoring kit 1 each Daily. accucheck eve meter, E11.9 6/11/20   Elvis Gamboa APRN   HYDROcodone-acetaminophen (NORCO) 7.5-325 MG per tablet Take 1 tablet by mouth Every 6 (Six) Hours As Needed for Moderate Pain . 6/21/22   Kimberly Ferguson APRN   insulin aspart (NovoLOG FlexPen) 100 UNIT/ML solution pen-injector sc pen INJECT 0 TO 14 UNITS UNDER THE SKIN 3 TIMES A DAY BEFORE MEALS ACCORDING TO SLIDING SCALE 4/21/22   Kimberly Ferguson APRN   Insulin Glargine (BASAGLAR KWIKPEN) 100 UNIT/ML injection pen INJECT 24 UNITS SUBCUTANEOUSLY AT BEDTIME 5/5/22   Kimberly Ferguson APRN   Insulin Pen Needle (B-D ULTRAFINE III SHORT PEN) 31G X 8 MM misc USE TO INJECT 5 TIMES A DAY 8/26/21   Elvis Gamboa APRN   Insulin Pen Needle 31G X 8 MM misc Use up to 4 (four) times daily with insulin as directed. 7/12/21   Brandon Martel MD   isosorbide mononitrate (IMDUR) 30 MG 24 hr tablet Take 1 tablet by mouth Daily. 4/7/22   Irina Watkins APRN   Lancets (accu-chek soft touch) lancets As directed 10/18/21   Kimberly Ferguson APRN   meclizine (ANTIVERT) 25 MG tablet Take 1 tablet by mouth 3 (Three) Times a Day As Needed for dizziness. 12/14/17   Evon Hernandez APRN   melatonin 3 MG tablet Take 2 tablets by mouth Every Night. May take 1 if this works, or may take 2 as single dose if more effective. 6/27/22   Kimberly Ferguson APRN   methocarbamol (ROBAXIN) 500 MG tablet TAKE 1 TABLET BY MOUTH 2 TIMES A DAY AS NEEDED FOR MUSCLE SPASMS. 6/7/22   Kimberly Ferguson APRN   metoclopramide (REGLAN) 10 MG tablet Take 1 tablet by mouth 4 (Four) Times a Day As Needed (nausea). 6/27/22   Ferguson, BEBE Luu   metoprolol succinate XL (TOPROL-XL) 50 MG 24  "hr tablet TAKE 1 TABLET BY MOUTH DAILY. DOSE INCREASED 5/24/21 4/26/22   Kimberly Ferguson APRN   montelukast (SINGULAIR) 10 MG tablet Take 1 tablet by mouth Every Night. To help with allergies causing runny nose 5/24/22   Kimberly Ferguson APRN   naloxone (NARCAN) 0.4 MG/ML injection Infuse 0.5 mL into a venous catheter Every 5 (Five) Minutes As Needed for Opioid Reversal. 3/13/21   Nick Faye MD   Needle, Disp, (Easy Touch FlipLock Needles) 25G X 1-1/2\" misc 1 each Every 28 (Twenty-Eight) Days. For administering B12 cyanocobalomin. Dx vitamin B12 deficiency. 5/24/22   Kimberly Ferguson APRN   Needle, Disp, (Hypodermic Needle) 20G X 1\" misc 1 each Every 28 (Twenty-Eight) Days. For drawing up B12 for injection monthly, change back to smaller gauge needle prior to injection. Dx Vitamin B12  Deficiency. 5/24/22   Kimberly Ferguson APRN   nitroglycerin (NITROSTAT) 0.4 MG SL tablet Place 1 tablet under the tongue Every 5 (Five) Minutes As Needed for Chest Pain. Take no more than 3 doses in 15 minutes. 4/26/22   Kimberly Ferguson APRN   NovoLOG 100 UNIT/ML injection INJECT 8 UNITS SUBCUTANEOUSLY 3 TIMES DAILY WITH MEALS. 3/25/22   Elvis Gamboa APRN   ondansetron (ZOFRAN) 8 MG tablet TAKE 1 TABLET BY MOUTH EVERY 8 (EIGHT) HOURS AS NEEDED FOR NAUSEA OR VOMITING. 12/17/21   Kimberly Ferguson APRN   pantoprazole (PROTONIX) 20 MG EC tablet Take 2 tablets by mouth Daily. 4/5/22   Larry Lopez MD   ranolazine (RANEXA) 500 MG 12 hr tablet Take 1 tablet by mouth Every 12 (Twelve) Hours. 6/29/22   Bill Gibson MD   Syringe 20G X 1-1/2\" 3 ML misc 1 each Every 28 (Twenty-Eight) Days. For injection cyanocobalomin, Dx vit B12 deficiency. 5/24/22   Kimberly Ferguson APRN   venlafaxine XR (EFFEXOR-XR) 75 MG 24 hr capsule Take 1 capsule by mouth Daily With Breakfast. 6/27/22   Kimberly Ferguson APRN   vitamin D (ERGOCALCIFEROL) 1.25 MG (23834 UT) capsule capsule Take 1 capsule by " mouth Every 7 (Seven) Days. 6/27/22   Ferguson, Kimberly VenturaBEBE       Review of Systems:  Review of Systems   Constitutional: Negative for chills, diaphoresis, fatigue and fever.   HENT: Negative for congestion, dental problem, ear pain, facial swelling, rhinorrhea and sinus pressure.    Eyes: Negative for photophobia, discharge, redness, itching and visual disturbance.   Respiratory: Negative for apnea, cough, choking, chest tightness, shortness of breath, wheezing and stridor.    Cardiovascular: Positive for chest pain. Negative for palpitations and leg swelling.   Gastrointestinal: Negative for abdominal distention, abdominal pain, anal bleeding, blood in stool, diarrhea, nausea, rectal pain and vomiting.   Endocrine: Negative for cold intolerance, heat intolerance, polydipsia, polyphagia and polyuria.   Genitourinary: Negative for difficulty urinating, flank pain, frequency, hematuria and urgency.   Musculoskeletal: Negative for arthralgias, back pain, joint swelling and myalgias.   Skin: Negative for pallor, rash and wound.   Allergic/Immunologic: Negative for environmental allergies and immunocompromised state.   Neurological: Negative for dizziness, tremors, seizures, facial asymmetry, speech difficulty, weakness, light-headedness, numbness and headaches.   Hematological: Negative for adenopathy. Does not bruise/bleed easily.   Psychiatric/Behavioral: Negative for agitation, behavioral problems and hallucinations. The patient is not nervous/anxious.       Otherwise complete ROS is negative except as mentioned above.    Physical Exam:   Temp:  [98.7 °F (37.1 °C)] 98.7 °F (37.1 °C)  Heart Rate:  [62-74] 67  Resp:  [18] 18  BP: (143-171)/(67-82) 160/68  Physical Exam  Constitutional:       General: She is not in acute distress.     Appearance: She is obese. She is not ill-appearing, toxic-appearing or diaphoretic.   HENT:      Head: Normocephalic and atraumatic.      Right Ear: External ear normal.      Left Ear:  External ear normal.      Nose: Nose normal.      Mouth/Throat:      Mouth: Mucous membranes are moist.      Pharynx: Oropharynx is clear.   Eyes:      Extraocular Movements: Extraocular movements intact.      Conjunctiva/sclera: Conjunctivae normal.      Pupils: Pupils are equal, round, and reactive to light.   Cardiovascular:      Rate and Rhythm: Normal rate and regular rhythm.      Heart sounds:     No friction rub. No gallop.   Pulmonary:      Effort: No respiratory distress.      Breath sounds: No stridor. No wheezing or rales.   Chest:      Chest wall: No tenderness.   Abdominal:      General: Abdomen is flat. There is no distension.      Palpations: Abdomen is soft.      Tenderness: There is no abdominal tenderness. There is no guarding or rebound.   Musculoskeletal:         General: No swelling or tenderness.      Cervical back: No rigidity or tenderness.      Right lower leg: No edema.      Left lower leg: No edema.   Lymphadenopathy:      Cervical: No cervical adenopathy.   Skin:     General: Skin is warm and dry.      Coloration: Skin is not jaundiced.      Findings: No erythema.   Neurological:      Mental Status: She is alert and oriented to person, place, and time. Mental status is at baseline.      Sensory: No sensory deficit.      Motor: No weakness.      Coordination: Coordination normal.   Psychiatric:         Mood and Affect: Mood normal.         Behavior: Behavior normal.         Judgment: Judgment normal.           Results Reviewed:  I have personally reviewed current lab, radiology, and data and agree with results.  Lab Results (last 24 hours)       Procedure Component Value Units Date/Time    COVID-19 and FLU A/B PCR - Swab, Nasopharynx [[id number]] Collected: 07/04/22 0412    Specimen: Swab from Nasopharynx Updated: 07/04/22 0415    Troponin [[id number]]  (Normal) Collected: 07/04/22 0325    Specimen: Blood Updated: 07/04/22 0351     Troponin T <0.010 ng/mL     Narrative:      Troponin T  Reference Range:  <= 0.03 ng/mL-   Negative for AMI  >0.03 ng/mL-     Abnormal for myocardial necrosis.  Clinicians would have to utilize clinical acumen, EKG, Troponin and serial changes to determine if it is an Acute Myocardial Infarction or myocardial injury due to an underlying chronic condition.       Results may be falsely decreased if patient taking Biotin.      Comprehensive Metabolic Panel [[id number]]  (Abnormal) Collected: 07/04/22 0152    Specimen: Blood Updated: 07/04/22 0214     Glucose 137 mg/dL      BUN 6 mg/dL      Creatinine 0.80 mg/dL      Sodium 143 mmol/L      Potassium 3.1 mmol/L      Chloride 106 mmol/L      CO2 26.0 mmol/L      Calcium 8.7 mg/dL      Total Protein 6.3 g/dL      Albumin 3.70 g/dL      ALT (SGPT) 12 U/L      AST (SGOT) 12 U/L      Alkaline Phosphatase 98 U/L      Total Bilirubin 0.4 mg/dL      Globulin 2.6 gm/dL      A/G Ratio 1.4 g/dL      BUN/Creatinine Ratio 7.5     Anion Gap 11.0 mmol/L      eGFR 84.5 mL/min/1.73      Comment: National Kidney Foundation and American Society of Nephrology (ASN) Task Force recommended calculation based on the Chronic Kidney Disease Epidemiology Collaboration (CKD-EPI) equation refit without adjustment for race.       Narrative:      GFR Normal >60  Chronic Kidney Disease <60  Kidney Failure <15      Lipase [[id number]]  (Abnormal) Collected: 07/04/22 0152    Specimen: Blood Updated: 07/04/22 0214     Lipase 8 U/L     Troponin [[id number]]  (Normal) Collected: 07/04/22 0152    Specimen: Blood Updated: 07/04/22 0214     Troponin T <0.010 ng/mL     Narrative:      Troponin T Reference Range:  <= 0.03 ng/mL-   Negative for AMI  >0.03 ng/mL-     Abnormal for myocardial necrosis.  Clinicians would have to utilize clinical acumen, EKG, Troponin and serial changes to determine if it is an Acute Myocardial Infarction or myocardial injury due to an underlying chronic condition.       Results may be falsely decreased if patient taking Biotin.      CBC &  Differential [[id number]]  (Abnormal) Collected: 07/04/22 0152    Specimen: Blood Updated: 07/04/22 0155    Narrative:      The following orders were created for panel order CBC & Differential.  Procedure                               Abnormality         Status                     ---------                               -----------         ------                     CBC Auto Differential[[id number]]        Abnormal            Final result                 Please view results for these tests on the individual orders.    CBC Auto Differential [[id number]]  (Abnormal) Collected: 07/04/22 0152    Specimen: Blood Updated: 07/04/22 0155     WBC 12.50 10*3/mm3      RBC 3.91 10*6/mm3      Hemoglobin 11.7 g/dL      Hematocrit 34.1 %      MCV 87.2 fL      MCH 29.9 pg      MCHC 34.3 g/dL      RDW 13.5 %      RDW-SD 41.8 fl      MPV 9.4 fL      Platelets 427 10*3/mm3      Neutrophil % 62.8 %      Lymphocyte % 24.2 %      Monocyte % 8.4 %      Eosinophil % 3.6 %      Basophil % 0.5 %      Immature Grans % 0.5 %      Neutrophils, Absolute 7.85 10*3/mm3      Lymphocytes, Absolute 3.03 10*3/mm3      Monocytes, Absolute 1.05 10*3/mm3      Eosinophils, Absolute 0.45 10*3/mm3      Basophils, Absolute 0.06 10*3/mm3      Immature Grans, Absolute 0.06 10*3/mm3      nRBC 0.0 /100 WBC           Imaging Results (Last 24 Hours)       Procedure Component Value Units Date/Time    XR Chest 1 View [[id number]] Collected: 07/04/22 0101     Updated: 07/04/22 0151    Narrative:      XR CHEST 1 VIEW    COMPARISONS: June 13, 2022    ADDITIONAL PERTINENT HISTORY: Chest pain    FINDINGS:  Cardiomediastinal silhouette:  Patient status post median  sternotomy. Stable mild cardiomegaly.    Pulmonary vasculature:  Mild vascular congestion without catalino  pulmonary edema.    Lung fields:  Elevation of the right hemidiaphragm.    Pleural spaces: Negative.    Osseous structures:  Negative.    Surrounding soft tissues:  Negative.        Impression:      1. Mild  "cardiomegaly with mild vascular congestion without catalino  pulmonary edema.  2. Continued elevation of the right hemidiaphragm    Electronically signed by:  Miguel Kyle MD  7/4/2022 1:49 AM CDT  Workstation: [id number]          Left heart catheterization on 4/6/2022 by Dr. Navas showed  \"Conclusion:  1.  One-vessel obstructive coronary artery disease involving chronic total occlusion of the LAD.  LIMA to LAD is patent, target vessel is small in caliber with severe diffuse disease but not amenable to PCI  2.  Patent stents in the LAD extending into the diagonal  3.  Mild disease in the RCA and left circumflex which is unchanged from before  4.  Normal left-sided filling pressures.\"    Patient had echocardiogram on 6/1/2022 which showed \"  The study is technically difficult for diagnosis. Verbal consent was obtained for use of agitated saline to assess for shunting.  Estimated left ventricular EF = 56% Left ventricular ejection fraction appears to be 56 - 60%. Left ventricular systolic function is normal. Normal left ventricular cavity size noted. Left ventricular wall thickness is consistent with mild concentric hypertrophy. All left ventricular wall segments contract normally. No evidence of left ventricular thrombus or mass present.  Saline test inconclusive  There is calcification of the aortic valve.  Estimated right ventricular systolic pressure from tricuspid regurgitation is mildly elevated (35-45 mmHg).\"    Her EKG on current admission shows sinus rhythm with ventricular response rate of 68, QTC of 476, with nonspecific ST-T changes with no sign of acute ischemic ST-T changes.      Assessment:    Active Hospital Problems    Diagnosis     Chest pain      # Atypical chest pain   # Known history of coronary artery disease, CABG and left heart catheterization in April 2022 results as above discussed  # Gastritis, esophageal ulcer, status post EGD in June 2022.    # Gastroparesis,   # History of diastolic " congestive heart failure with preserved left ventricular ejection fraction   # diabetes mellitus type 2 insulin-dependent,  # Diabetic polyneuropathy,   # hypertension, uncontrolled   # hyperlipidemia,   # depression  # Mild prolongation of QTC  # Morbid obesity with BMI of 38.         Plan:  Placed on telemetry  Obtain serial EKG and cardiac enzymes  Obtain further laboratory work-up as needed  Place on nitroglycerin as needed  Continue baseline home medication aspirin, Plavix, Lipitor, Imdur, metoprolol, Ranexa.  Continue PPI for GI care, place on Carafate considering her atypical pain  Accu-Chek ACH S  Insulin sliding scale, Levemir insulin  Diabetic diet  Comorbidities will be treated appropriately  Avoid excessive use of medication with tendency for prolongation of QTC  Reconcile home medication continue with essential home medications  Patient may benefit from medically supervised weight reduction outpatient.  Please see orders for comprehensive plan.    I confirmed that the patient's Advance Care Plan is present, code status is documented, or surrogate decision maker is listed in the patient's medical record.   Patient decided for full code.  Patient decided that her  Nadeem be her healthcare proxy.    I have utilized all available immediate resources to obtain, update, or review the patient's current medications.     I discussed the patient's findings and my recommendations with: Patient and  both agreed with above plan of care.      Saeid Behroozi, MD   07/04/22   04:36 CDT

## 2022-07-04 NOTE — PROGRESS NOTES
[facility] Medicine Services  INPATIENT PROGRESS NOTE    Length of Stay: 0  Date of Admission: 7/4/2022  Primary Care Physician: Marty, BEBE Luu    Subjective   Chief Complaint:  Chest pain     HPI: This is a 62-year-old female with past medical history of CAD (status post CABG), hypertension, hyperlipidemia, left BKA, DM2, and CHF that presented to [facility] on 7/4/2022 with complaints of chest pain and shortness of air.    The patient underwent heart cath in April 2022 that showed patent stents.  She had severe disease in the distal LAD after LIMA touchdown but was less than 2 mm in diameter and not amenable to PCI.  Medical management was recommended at that time.  During that admission, the patient was also evaluated by Dr. Cody with gastroenterology and underwent EGD that showed gastritis, esophageal ulcer and retained food in the stomach.  She had a gastric emptying study at that time that showed gastroparesis.    Review of Systems   Constitutional: Negative for activity change and fatigue.   HENT: Negative for ear pain and sore throat.    Eyes: Negative for pain and discharge.   Respiratory: Positive for shortness of breath. Negative for cough.    Cardiovascular: Positive for chest pain. Negative for palpitations.   Gastrointestinal: Negative for abdominal pain and nausea.   Endocrine: Negative for cold intolerance and heat intolerance.   Genitourinary: Negative for difficulty urinating and dysuria.   Musculoskeletal: Negative for arthralgias and gait problem.   Skin: Negative for color change and rash.   Neurological: Negative for dizziness and weakness.   Psychiatric/Behavioral: Negative for agitation and confusion.        Objective    Temp:  [97.9 °F (36.6 °C)-98.7 °F (37.1 °C)] 97.9 °F (36.6 °C)  Heart Rate:  [61-74] 64  Resp:  [16-18] 16  BP: (143-175)/(62-82) 151/62    Physical Exam  Constitutional:       Appearance: She is well-developed.   HENT:      Head: Normocephalic  and atraumatic.   Eyes:      Pupils: Pupils are equal, round, and reactive to light.   Cardiovascular:      Rate and Rhythm: Normal rate and regular rhythm.   Pulmonary:      Effort: Pulmonary effort is normal.      Breath sounds: Normal breath sounds.   Abdominal:      General: Bowel sounds are normal.      Palpations: Abdomen is soft.   Musculoskeletal:         General: Normal range of motion.      Cervical back: Normal range of motion and neck supple.   Skin:     General: Skin is warm and dry.   Neurological:      Mental Status: She is alert and oriented to person, place, and time.   Psychiatric:         Behavior: Behavior normal.       Results Review:  I have reviewed the labs, radiology results, and diagnostic studies.    Laboratory Data:   Results from last 7 days   Lab Units 07/04/22  0152   SODIUM mmol/L 143   POTASSIUM mmol/L 3.1*   CHLORIDE mmol/L 106   CO2 mmol/L 26.0   BUN mg/dL 6*   CREATININE mg/dL 0.80   GLUCOSE mg/dL 137*   CALCIUM mg/dL 8.7   BILIRUBIN mg/dL 0.4   ALK PHOS U/L 98   ALT (SGPT) U/L 12   AST (SGOT) U/L 12   ANION GAP mmol/L 11.0     Estimated Creatinine Clearance: 103.3 mL/min (by C-G formula based on SCr of 0.8 mg/dL).          Results from last 7 days   Lab Units 07/04/22  0152   WBC 10*3/mm3 12.50*   HEMOGLOBIN g/dL 11.7*   HEMATOCRIT % 34.1   PLATELETS 10*3/mm3 427           Culture Data:   No results found for: BLOODCX  No results found for: URINECX  No results found for: RESPCX  No results found for: WOUNDCX  No results found for: STOOLCX  No components found for: BODYFLD    Radiology Data:   Imaging Results (Last 24 Hours)     Procedure Component Value Units Date/Time    XR Chest 1 View [[id number]] Collected: 07/04/22 0101     Updated: 07/04/22 0151    Narrative:      XR CHEST 1 VIEW    COMPARISONS: June 13, 2022    ADDITIONAL PERTINENT HISTORY: Chest pain    FINDINGS:  Cardiomediastinal silhouette:  Patient status post median  sternotomy. Stable mild cardiomegaly.    Pulmonary  vasculature:  Mild vascular congestion without catalino  pulmonary edema.    Lung fields:  Elevation of the right hemidiaphragm.    Pleural spaces: Negative.    Osseous structures:  Negative.    Surrounding soft tissues:  Negative.        Impression:      1. Mild cardiomegaly with mild vascular congestion without catalino  pulmonary edema.  2. Continued elevation of the right hemidiaphragm    Electronically signed by:  Miguel Kyle MD  7/4/2022 1:49 AM CDT  Workstation: [id number]          I have reviewed the patient's current medications.     Assessment/Plan     Active Hospital Problems    Diagnosis    • Chest pain        Plan:    1.  Chest pain, rule out ACS: Serial cardiac enzymes, EKG and continuous telemetry.  Cardiology consult appreciated.  Dr. Gibson to resume coverage tomorrow.  2.  CAD/hypertension: Continue home aspirin, Plavix, Lipitor, Imdur, metoprolol and Ranexa.  3.  History of esophageal ulcer, gastritis and gastroparesis: Continue PPI and Carafate.  4.  Heart failure with preserved ejection fraction: BNP pending.  Most recent echo was 6/1/2022.  5.  Diabetes mellitus, type II: Continue long-acting and SSI.  6.  Hyperlipidemia: Continue home statin.  7.  Hypokalemia: Potassium protocol in place.      DVT prophylaxis: Lovenox.        Discharge Planning: I expect patient to be discharged to home in 1-2 days.    I confirmed that the patient's Advance Care Plan is present, code status is documented, or surrogate decision maker is listed in the patient's medical record.      I have utilized all available immediate resources to obtain, update, or review the patient's current medications.         This document has been electronically signed by BEBE Mosher on July 4, 2022 15:03 CDT

## 2022-07-04 NOTE — CONSULTS
[facility] Cardiology  HISTORY AND PHYSICAL  Ariana Martinez  62 y.o. female    Chief complaint -  Chest pain    History of Present Illness:  62-year-old female with known history of coronary artery disease, congestive heart failure preserved ejection fraction, hypertension, diabetes, she has known extensive history of coronary artery disease status post CABG with a LIMA to LAD which is known to be atretic.  She had a PCI done from her LAD into diagonal in 2020, RCA had borderline disease.  She presented to hospital with chest pain in april.  Was ruled out for acute coronary syndrome.  Subsequently a CT coronary angiogram was performed which showed possible occlusion of her recently placed stents in the LAD/diagonal.So patient underwent repeat cardiac cath which showed patent stents in apriol 2022.  She had severe disease in the distal LAD after LIMA touchdown but was less than 2 mm in diameter and not amenable to PCI.  Medical management Was recommended at that time.        She was in the hospital last month with recurrent chest pain. further patient was seen by gastroenterologist Dr. Cody and underwent EGD, which showed gastritis, esophageal ulcer and retained food in the stomach.  She had gastric emptying study at that time which showed gastroparesis, she had a negative VQ scan at that time    She had presented again to the hospital with the chest pain.  Patient reported she had sudden onset pain in the substernal area, it was worse with breathing and laying downBut constant.  No association with exertion.  Nonradiating.  No associated shortness of breath nausea vomiting.Not respond to nitroglycerin.She was admitted to the hospital and ruled out for acute coronary syndrome ruled consulted for further evaluation.    On my visit she was lying comfortably in bed, Reported pain has improved with pain medication but still has some dull ache.        Allergies   Allergen Reactions   • Seroquel  [Quetiapine] Anaphylaxis   • Avandia [Rosiglitazone] Swelling   • Morphine And Related Hallucinations   • Oxycodone Hallucinations         Past Medical History:   Diagnosis Date   • Acute bacterial endocarditis 3/13/2021   • Angina, class IV ([facility])    • Anxiety    • Anxiety and depression    • Arthritis    • Benign paroxysmal positional vertigo    • Bladder disorder     has bladder stimulator   • Carpal tunnel syndrome    • CHF (congestive heart failure) ([facility])    • Chronic pain    • Coronary atherosclerosis     hx CABG 2005.  is followed by Dr Kwon   • Depression    • Diabetes mellitus ([facility])     Type 2, controlled   • Diabetic polyneuropathy ([facility])    • Disease of thyroid gland    • Elevated cholesterol    • Female stress incontinence    • Foot pain, left    • Full dentures    • Gastroparesis    • GERD (gastroesophageal reflux disease)    • Hyperlipidemia    • Hypertension    • Low back pain    • Malaise and fatigue    • Multiple joint pain    • Obesity     Refuses to be weighed   • Occlusion and stenosis of bilateral carotid arteries 6/18/2021   • Otalgia     Both   • Perforation of tympanic membrane     Left   • Postoperative wound infection    • Vitamin D deficiency    • Wears glasses     reading         Past Surgical History:   Procedure Laterality Date   • ABDOMINAL SURGERY     • AMPUTATION FOOT     • ANGIOPLASTY      coronary   • ANKLE ARTHROSCOPY Left 2/26/2021    Procedure: Left foot hardware removal, ankle arthroscopy, bone grafting , left foot exostectomy;  Surgeon: Ignacio Lord DPM;  Location: [facility] OR;  Service: Podiatry;  Laterality: Left;   • BREAST BIOPSY Right    • CARDIAC CATHETERIZATION     • CARDIAC CATHETERIZATION N/A 6/20/2017    Procedure: Right Heart Cath;  Surgeon: Can Kwon MD PhD;  Location: [facility] INVASIVE LOCATION;  Service:    • CARDIAC CATHETERIZATION N/A 2/18/2020    Procedure: Left Heart Cath;  Surgeon: Catalina Cooper MD;  Location: [facility]  INVASIVE LOCATION;  Service: Cardiology;  Laterality: N/A;   • CARDIAC CATHETERIZATION N/A 4/6/2022    Procedure: Left Heart Cath;  Surgeon: Sheryl Navas MD;  Location: [facility] CATH INVASIVE LOCATION;  Service: Cardiology;  Laterality: N/A;   • CARPAL TUNNEL RELEASE     • CHOLECYSTECTOMY     • COLONOSCOPY N/A 6/24/2020    Procedure: COLONOSCOPY;  Surgeon: Julián Maldonado MD;  Location: [facility] ENDOSCOPY;  Service: Gastroenterology;  Laterality: N/A;   • CORONARY ARTERY BYPASS GRAFT  2005   • ENDOSCOPY N/A 10/19/2018    Procedure: ESOPHAGOGASTRODUODENOSCOPY possible dilation;  Surgeon: Julián Maldonado MD;  Location: [facility] ENDOSCOPY;  Service: Gastroenterology   • ENDOSCOPY N/A 6/24/2020    Procedure: ESOPHAGOGASTRODUODENOSCOPY WED appt please;  Surgeon: Julián Maldonado MD;  Location: [facility] ENDOSCOPY;  Service: Gastroenterology;  Laterality: N/A;   • ENDOSCOPY N/A 6/10/2022    Procedure: ESOPHAGOGASTRODUODENOSCOPY   room 380;  Surgeon: Jeremiah Wilkins MD;  Location: [facility] ENDOSCOPY;  Service: Gastroenterology;  Laterality: N/A;   • ENDOSCOPY AND COLONOSCOPY     • FOOT SURGERY      Toes   • FOOT SURGERY     • GASTRIC BANDING      Revision, laparoscopic   • HYSTERECTOMY     • INCISION AND DRAINAGE LEG Left 3/12/2021    Procedure: Left ankle arthroscopic irrigation and debridement, screw removal;  Surgeon: Ignacio Lord DPM;  Location: [facility] OR;  Service: Podiatry;  Laterality: Left;   • MOUTH SURGERY     • SALPINGO OOPHORECTOMY     • SHOULDER SURGERY     • SUBTALAR ARTHRODESIS Left 1/16/2019    Procedure: LEFT FOOT HARDWARE REMOVAL, FIFTH METATARSAL , OPEN REDUCTION INTERNAL FIXATION, CALCANEAL OSTEOTOMY;  Surgeon: Ignacio Lord DPM;  Location: [facility] OR;  Service: Podiatry   • SUBTALAR ARTHRODESIS Left 10/16/2019    Procedure: foot hardware removal, subtalar joint fusion  possible de/reattachment of achilles tendon        (c-arm);  Surgeon: Ignacio Lord DPM;  Location: [facility] OR;  Service:  Podiatry   • SUBTALAR ARTHRODESIS Left 9/30/2020    Procedure: subtalar, talonavicular joint arthrodesis.  Removal hardware.          (c-arm);  Surgeon: Ignacio Lord DPM;  Location: [facility];  Service: Podiatry;  Laterality: Left;   • TRANSESOPHAGEAL ECHOCARDIOGRAM (LAMONTE)      With color flow         Family History   Problem Relation Age of Onset   • Diabetes Other    • Heart disease Other    • Hypertension Other    • Heart disease Mother    • Stroke Mother    • Hypertension Mother    • Diabetes Sister    • Heart disease Sister    • Hypertension Sister    • Heart disease Sister    • Diabetes Sister    • Hypertension Sister    • Diabetes Sister    • Diabetes Sister    • Diabetes Sister    • Diabetes Sister          Social History     Socioeconomic History   • Marital status:    Tobacco Use   • Smoking status: Never Smoker   • Smokeless tobacco: Never Used   Vaping Use   • Vaping Use: Never used   Substance and Sexual Activity   • Alcohol use: No   • Drug use: No   • Sexual activity: Defer         Prior to Admission medications    Medication Sig Start Date End Date Taking? Authorizing Provider   amLODIPine (NORVASC) 5 MG tablet Take 1 tablet by mouth Daily. 6/27/22  Yes Kimberly Ferguson APRN   ARIPiprazole (ABILIFY) 5 MG tablet Take 1 tablet by mouth Daily. 6/27/22  Yes Kimberly Ferguson APRN   atorvastatin (LIPITOR) 80 MG tablet TAKE 1 TABLET BY MOUTH EVERY DAY 4/26/22  Yes Kimberly Ferguson APRN   Calcium Citrate-Vitamin D 250-200 MG-UNIT tablet Take 2 tablets by mouth 2 (two) times a day.   Yes Provider, MD Esther   clopidogrel (PLAVIX) 75 MG tablet Take 1 tablet by mouth Daily. 2/28/22  Yes Kimberly Ferguson APRN   cyanocobalamin 1000 MCG/ML injection INJECT 1 ML INTO THE APPROPRIATE MUSCLE AS DIRECTED BY PRESCRIBER EVERY 28 (TWENTY-EIGHT) DAYS. 4/13/22  Yes Kimberly Ferguson APRN   folic acid (FOLVITE) 1 MG tablet TAKE 1 TABLET BY MOUTH EVERY DAY 3/24/22  Yes Teressa Hammond APRN    gabapentin (NEURONTIN) 100 MG capsule Take 1 capsule by mouth Every 12 (Twelve) Hours. 6/27/22  Yes Ferguson, BEBE Luu   HYDROcodone-acetaminophen (NORCO) 7.5-325 MG per tablet Take 1 tablet by mouth Every 6 (Six) Hours As Needed for Moderate Pain . 6/21/22  Yes Ferguson, BEBE Luu   Insulin Glargine (BASAGLAR KWIKPEN) 100 UNIT/ML injection pen INJECT 24 UNITS SUBCUTANEOUSLY AT BEDTIME 5/5/22  Yes Ferguson, BEBE Luu   isosorbide mononitrate (IMDUR) 30 MG 24 hr tablet Take 1 tablet by mouth Daily. 4/7/22  Yes Irina Watkins APRN   melatonin 3 MG tablet Take 2 tablets by mouth Every Night. May take 1 if this works, or may take 2 as single dose if more effective. 6/27/22  Yes Ferguson, BEBE Luu   methocarbamol (ROBAXIN) 500 MG tablet TAKE 1 TABLET BY MOUTH 2 TIMES A DAY AS NEEDED FOR MUSCLE SPASMS. 6/7/22  Yes Ferguson, BEBE Luu   metoclopramide (REGLAN) 10 MG tablet Take 1 tablet by mouth 4 (Four) Times a Day As Needed (nausea). 6/27/22  Yes Ferguson, BEBE Luu   metoprolol succinate XL (TOPROL-XL) 50 MG 24 hr tablet TAKE 1 TABLET BY MOUTH DAILY. DOSE INCREASED 5/24/21 4/26/22  Yes FergusonKimberly APRN   nitroglycerin (NITROSTAT) 0.4 MG SL tablet Place 1 tablet under the tongue Every 5 (Five) Minutes As Needed for Chest Pain. Take no more than 3 doses in 15 minutes. 4/26/22  Yes Ferguson, BEBE Luu   NovoLOG 100 UNIT/ML injection INJECT 8 UNITS SUBCUTANEOUSLY 3 TIMES DAILY WITH MEALS.  Patient taking differently: Flex pen already charted 3/25/22  Yes Elvis Gamboa APRN   pantoprazole (PROTONIX) 20 MG EC tablet Take 2 tablets by mouth Daily. 4/5/22  Yes Larry Lopez MD   ranolazine (RANEXA) 500 MG 12 hr tablet Take 1 tablet by mouth Every 12 (Twelve) Hours. 6/29/22  Yes Bill Gibson MD   venlafaxine XR (EFFEXOR-XR) 75 MG 24 hr capsule Take 1 capsule by mouth Daily With Breakfast. 6/27/22  Yes Ferguson, BEBE Luu   vitamin D  (ERGOCALCIFEROL) 1.25 MG (68349 UT) capsule capsule Take 1 capsule by mouth Every 7 (Seven) Days. 6/27/22  Yes Marty, BEBE Luu   aspirin  MG tablet Take 1 tablet by mouth Daily.  Patient taking differently: Take 81 mg by mouth Daily. 2/8/22   Mahin Esteves MD   azithromycin (ZITHROMAX) 250 MG tablet Take 2 tablets the first day, then 1 tablet daily for 4 days. 6/27/22   Marty, BEBE Luu   BD SHARPS CONTAINER HOME misc 1 each Take As Directed. 9/7/18   Francisca Fong MD   Blood Glucose Monitoring Suppl (ONE TOUCH ULTRA MINI) w/Device kit Patient will need the Accu-Check Avitio meter 10/18/21   Marty, BEBE Luu   glucose blood (Accu-Chek Guide) test strip 1 each by Other route 4 (Four) Times a Day. To test blood sugar 4X daily. For  Dx E11.65 5/2/22   Marty, BEBE Luu   glucose monitor monitoring kit 1 each Daily. accucheck eve meter, E11.9 6/11/20   Elvis Gamboa APRN   insulin aspart (NovoLOG FlexPen) 100 UNIT/ML solution pen-injector sc pen INJECT 0 TO 14 UNITS UNDER THE SKIN 3 TIMES A DAY BEFORE MEALS ACCORDING TO SLIDING SCALE  Patient taking differently: 30 Units 3 (Three) Times a Day With Meals. 4/21/22   Kimberly Ferguson APRN   Insulin Pen Needle (B-D ULTRAFINE III SHORT PEN) 31G X 8 MM misc USE TO INJECT 5 TIMES A DAY 8/26/21   Elvis Gamboa APRN   Insulin Pen Needle 31G X 8 MM misc Use up to 4 (four) times daily with insulin as directed. 7/12/21   Brandon Martel MD   Lancets (accu-chek soft touch) lancets As directed 10/18/21   Kimberly Ferguson APRN   meclizine (ANTIVERT) 25 MG tablet Take 1 tablet by mouth 3 (Three) Times a Day As Needed for dizziness. 12/14/17   Evon Hernandez APRN   montelukast (SINGULAIR) 10 MG tablet Take 1 tablet by mouth Every Night. To help with allergies causing runny nose 5/24/22   Ferguson, BEBE Luu   naloxone (NARCAN) 0.4 MG/ML injection Infuse 0.5 mL into a venous catheter Every 5  "(Five) Minutes As Needed for Opioid Reversal. 3/13/21   Nick Faye MD   Needle, Disp, (Easy Touch FlipLock Needles) 25G X 1-1/2\" misc 1 each Every 28 (Twenty-Eight) Days. For administering B12 cyanocobalomin. Dx vitamin B12 deficiency. 5/24/22   Ferguson, BEBE Luu   Needle, Disp, (Hypodermic Needle) 20G X 1\" misc 1 each Every 28 (Twenty-Eight) Days. For drawing up B12 for injection monthly, change back to smaller gauge needle prior to injection. Dx Vitamin B12  Deficiency. 5/24/22   Ferguson, BEBE Luu   ondansetron (ZOFRAN) 8 MG tablet TAKE 1 TABLET BY MOUTH EVERY 8 (EIGHT) HOURS AS NEEDED FOR NAUSEA OR VOMITING. 12/17/21   Ferguson, BEBE Luu   Syringe 20G X 1-1/2\" 3 ML misc 1 each Every 28 (Twenty-Eight) Days. For injection cyanocobalomin, Dx vit B12 deficiency. 5/24/22   Ferguson, BEBE Luu         Review of Systems:     Constitution: Denies any fatigue, fever or chills.  HENT: Denies any headache, hearing impairment.  Eyes: Denies any blurring of vision, or photophobia.  Cardiovascular:  As per history of present illness.   Respiratory system: Denies any COPD, shortness of breath.  Endocrine:  No history of hyperlipidemia, diabetes.  Musculoskeletal:  No history of arthritis with musculoskeletal problems.  Gastrointestinal: No nausea, vomiting, or melena.  Genitourinary: No dysuria or hematuria.  Neurological: No history of seizure disorder, stroke, or memory problems.    Psychiatric/Behavioral: No history of depression, bipolar disorder or schizophrenia .    Hematological: No history of easy bruising.    ROS          OBJECTIVE:    /62 (BP Location: Left arm, Patient Position: Sitting)   Pulse 64   Temp 97.9 °F (36.6 °C) (Temporal)   Resp 16   Ht 172.7 cm (68\")   Wt 123 kg (270 lb 4.8 oz)   SpO2 95%   BMI 41.10 kg/m²       Physical Exam:   Constitutional: Patient is oriented to person, place, and time.   Skin: Warm and dry.  Well developed and nourished in no acute " distress .  Head: Normocephalic and atraumatic.   Eyes: Pupils are equal, round, and reactive to light.   Neck: Neck supple. No bruit in the carotids, no elevation of JVD.  Cardiovascular: [place] in the fifth intercostal space, regular rate, and rhythm,  S1 greater than S2, no S3 or S4, no gallop.  Pulmonary/Chest: Air entry is equal on both sides.  No wheezing or crackles.  Abdominal: Soft.  No hepatosplenomegaly, bowel sounds are present.  Musculoskeletal: No kyphoscoliosis.  Neurological: Alert and oriented to person, place, and time.  Cranial nerves are intact. No motor or sensory deficit.  Extremities: No edema, no radial femoral delay.  Psychiatric: Normal mood and affect. Behavior is normal.      Lab Results (last 24 hours)     Procedure Component Value Units Date/Time    CBC & Differential [[id number]]  (Abnormal) Collected: 07/04/22 0152    Specimen: Blood Updated: 07/04/22 0155    Narrative:      The following orders were created for panel order CBC & Differential.  Procedure                               Abnormality         Status                     ---------                               -----------         ------                     CBC Auto Differential[[id number]]        Abnormal            Final result                 Please view results for these tests on the individual orders.    Comprehensive Metabolic Panel [[id number]]  (Abnormal) Collected: 07/04/22 0152    Specimen: Blood Updated: 07/04/22 0214     Glucose 137 mg/dL      BUN 6 mg/dL      Creatinine 0.80 mg/dL      Sodium 143 mmol/L      Potassium 3.1 mmol/L      Chloride 106 mmol/L      CO2 26.0 mmol/L      Calcium 8.7 mg/dL      Total Protein 6.3 g/dL      Albumin 3.70 g/dL      ALT (SGPT) 12 U/L      AST (SGOT) 12 U/L      Alkaline Phosphatase 98 U/L      Total Bilirubin 0.4 mg/dL      Globulin 2.6 gm/dL      A/G Ratio 1.4 g/dL      BUN/Creatinine Ratio 7.5     Anion Gap 11.0 mmol/L      eGFR 84.5 mL/min/1.73      Comment: National Kidney  Foundation and American Society of Nephrology (ASN) Task Force recommended calculation based on the Chronic Kidney Disease Epidemiology Collaboration (CKD-EPI) equation refit without adjustment for race.       Narrative:      GFR Normal >60  Chronic Kidney Disease <60  Kidney Failure <15      Lipase [[id number]]  (Abnormal) Collected: 07/04/22 0152    Specimen: Blood Updated: 07/04/22 0214     Lipase 8 U/L     Troponin [[id number]]  (Normal) Collected: 07/04/22 0152    Specimen: Blood Updated: 07/04/22 0214     Troponin T <0.010 ng/mL     Narrative:      Troponin T Reference Range:  <= 0.03 ng/mL-   Negative for AMI  >0.03 ng/mL-     Abnormal for myocardial necrosis.  Clinicians would have to utilize clinical acumen, EKG, Troponin and serial changes to determine if it is an Acute Myocardial Infarction or myocardial injury due to an underlying chronic condition.       Results may be falsely decreased if patient taking Biotin.      CBC Auto Differential [[id number]]  (Abnormal) Collected: 07/04/22 0152    Specimen: Blood Updated: 07/04/22 0155     WBC 12.50 10*3/mm3      RBC 3.91 10*6/mm3      Hemoglobin 11.7 g/dL      Hematocrit 34.1 %      MCV 87.2 fL      MCH 29.9 pg      MCHC 34.3 g/dL      RDW 13.5 %      RDW-SD 41.8 fl      MPV 9.4 fL      Platelets 427 10*3/mm3      Neutrophil % 62.8 %      Lymphocyte % 24.2 %      Monocyte % 8.4 %      Eosinophil % 3.6 %      Basophil % 0.5 %      Immature Grans % 0.5 %      Neutrophils, Absolute 7.85 10*3/mm3      Lymphocytes, Absolute 3.03 10*3/mm3      Monocytes, Absolute 1.05 10*3/mm3      Eosinophils, Absolute 0.45 10*3/mm3      Basophils, Absolute 0.06 10*3/mm3      Immature Grans, Absolute 0.06 10*3/mm3      nRBC 0.0 /100 WBC     Troponin [[id number]]  (Normal) Collected: 07/04/22 0325    Specimen: Blood Updated: 07/04/22 0351     Troponin T <0.010 ng/mL     Narrative:      Troponin T Reference Range:  <= 0.03 ng/mL-   Negative for AMI  >0.03 ng/mL-     Abnormal for  myocardial necrosis.  Clinicians would have to utilize clinical acumen, EKG, Troponin and serial changes to determine if it is an Acute Myocardial Infarction or myocardial injury due to an underlying chronic condition.       Results may be falsely decreased if patient taking Biotin.      COVID-19 and FLU A/B PCR - Swab, Nasopharynx [[id number]]  (Normal) Collected: 07/04/22 0412    Specimen: Swab from Nasopharynx Updated: 07/04/22 0442     COVID19 Not Detected     Influenza A PCR Not Detected     Influenza B PCR Not Detected    Narrative:      Fact sheet for providers: https://www.fda.gov/media/460971/download    Fact sheet for patients: https://www.fda.gov/media/807645/download    Test performed by PCR.    Troponin [[id number]]  (Normal) Collected: 07/04/22 0632    Specimen: Blood Updated: 07/04/22 0740     Troponin T <0.010 ng/mL     Narrative:      Troponin T Reference Range:  <= 0.03 ng/mL-   Negative for AMI  >0.03 ng/mL-     Abnormal for myocardial necrosis.  Clinicians would have to utilize clinical acumen, EKG, Troponin and serial changes to determine if it is an Acute Myocardial Infarction or myocardial injury due to an underlying chronic condition.       Results may be falsely decreased if patient taking Biotin.            Results for orders placed during the hospital encounter of 05/31/22    Adult Transthoracic Echo Limited W/ Cont if Necessary Per Protocol    Interpretation Summary  · The study is technically difficult for diagnosis. Verbal consent was obtained for use of agitated saline to assess for shunting.  · Estimated left ventricular EF = 56% Left ventricular ejection fraction appears to be 56 - 60%. Left ventricular systolic function is normal. Normal left ventricular cavity size noted. Left ventricular wall thickness is consistent with mild concentric hypertrophy. All left ventricular wall segments contract normally. No evidence of left ventricular thrombus or mass present.  · Saline test  inconclusive  · There is calcification of the aortic valve.  · Estimated right ventricular systolic pressure from tricuspid regurgitation is mildly elevated (35-45 mmHg).      The 10-year ASCVD risk score ([place]ston BAIRON Jr., et al., 2013) is: 12.3%    Values used to calculate the score:      Age: 62 years      Sex: Female      Is Non- : No      Diabetic: Yes      Tobacco smoker: No      Systolic Blood Pressure: 151 mmHg      Is BP treated: Yes      HDL Cholesterol: 41 mg/dL      Total Cholesterol: 169 mg/dL          A/P:    1. Chest pain:  She is extensive history of coronary artery disease, recent cardiac cath in April showed her major epicardial arteries were patent with patent stents.  She does have known severe disease in the distal LAD after the touchdown of the LIMA however it is less than 2 mm in diameter and not amenable to PCI.  Chest pain this admission appears noncardiac in nature, it is constant, no association with exertion, it is reproducible on palpation at the costal chondral junctions and gets worse with breathing, possible costochondritis versus musculoskeletal pain.  She was ruled out for acute coronary syndrome this admission.  EKG without ischemic changes  Echocardiogram without any regional wall motion normalities  Continue aspirin/statin/Plavix.  Continue Imdur/Ranexa and metoprolol XL And amlodipine.    Has a complaint of pleurisy and possible GI causes. Primary team working up.   Repeat invasive evaluation not recommended at this time.    Dr. Snowden will resume care in the morning.       Class 3 Severe Obesity (BMI >=40). Obesity-related health conditions include the following: hypertension. Obesity is newly identified. BMI is is above average; BMI management plan is completed. We discussed portion control and increasing exercise.      Ariana Smith Juan  reports that she has never smoked. She has never used smokeless tobacco..      Sheryl Navas MD  7/4/2022  12:46  CDT      Part of this note may be an electronic transcription/translation of spoken language to printed text using the Dragon Dictation System.

## 2022-07-04 NOTE — ED PROVIDER NOTES
Subjective     Chest Pain  Pain location:  L chest  Pain quality: crushing    Pain radiates to:  Does not radiate  Pain severity:  Mild  Onset quality:  Sudden  Duration:  3 hours  Timing:  Constant  Progression:  Partially resolved  Chronicity:  New  Context: at rest    Context: not trauma    Context comment:  Patient was at rest on the couch and had the chest discomfort.  Denies injury.  Has been recently concerned about swelling in her hands.  History of hypertension.  Recently had Lasix discontinued.  Relieved by:  Nothing  Worsened by:  Nothing  Ineffective treatments:  None tried  Associated symptoms: no abdominal pain, no anxiety, no cough, no fever, no orthopnea, no shortness of breath and no vomiting        Review of Systems   Constitutional: Negative for fever.   Respiratory: Negative for cough and shortness of breath.    Cardiovascular: Positive for chest pain. Negative for orthopnea.   Gastrointestinal: Negative for abdominal pain and vomiting.   All other systems reviewed and are negative.      Past Medical History:   Diagnosis Date   • Acute bacterial endocarditis 3/13/2021   • Angina, class IV ([facility])    • Anxiety    • Anxiety and depression    • Arthritis    • Benign paroxysmal positional vertigo    • Bladder disorder     has bladder stimulator   • Carpal tunnel syndrome    • CHF (congestive heart failure) ([facility])    • Chronic pain    • Coronary atherosclerosis     hx CABG 2005.  is followed by Dr Kwon   • Depression    • Diabetes mellitus ([facility])     Type 2, controlled   • Diabetic polyneuropathy ([facility])    • Disease of thyroid gland    • Elevated cholesterol    • Female stress incontinence    • Foot pain, left    • Full dentures    • Gastroparesis    • GERD (gastroesophageal reflux disease)    • Hyperlipidemia    • Hypertension    • Low back pain    • Malaise and fatigue    • Multiple joint pain    • Obesity     Refuses to be weighed   • Occlusion and stenosis of bilateral carotid arteries 6/18/2021    • Otalgia     Both   • Perforation of tympanic membrane     Left   • Postoperative wound infection    • Vitamin D deficiency    • Wears glasses     reading       Allergies   Allergen Reactions   • Seroquel [Quetiapine] Anaphylaxis   • Avandia [Rosiglitazone] Swelling   • Morphine And Related Hallucinations   • Oxycodone Hallucinations       Past Surgical History:   Procedure Laterality Date   • ABDOMINAL SURGERY     • AMPUTATION FOOT     • ANGIOPLASTY      coronary   • ANKLE ARTHROSCOPY Left 2/26/2021    Procedure: Left foot hardware removal, ankle arthroscopy, bone grafting , left foot exostectomy;  Surgeon: Ignacio Lord DPM;  Location: [facility] OR;  Service: Podiatry;  Laterality: Left;   • BREAST BIOPSY Right    • CARDIAC CATHETERIZATION     • CARDIAC CATHETERIZATION N/A 6/20/2017    Procedure: Right Heart Cath;  Surgeon: Can Kwon MD PhD;  Location: [facility] CATH INVASIVE LOCATION;  Service:    • CARDIAC CATHETERIZATION N/A 2/18/2020    Procedure: Left Heart Cath;  Surgeon: Catalina Cooper MD;  Location: [facility] CATH INVASIVE LOCATION;  Service: Cardiology;  Laterality: N/A;   • CARDIAC CATHETERIZATION N/A 4/6/2022    Procedure: Left Heart Cath;  Surgeon: Sheryl Navas MD;  Location: [facility] CATH INVASIVE LOCATION;  Service: Cardiology;  Laterality: N/A;   • CARPAL TUNNEL RELEASE     • CHOLECYSTECTOMY     • COLONOSCOPY N/A 6/24/2020    Procedure: COLONOSCOPY;  Surgeon: Julián Maldonado MD;  Location: [facility] ENDOSCOPY;  Service: Gastroenterology;  Laterality: N/A;   • CORONARY ARTERY BYPASS GRAFT  2005   • ENDOSCOPY N/A 10/19/2018    Procedure: ESOPHAGOGASTRODUODENOSCOPY possible dilation;  Surgeon: Julián Maldonado MD;  Location: [facility] ENDOSCOPY;  Service: Gastroenterology   • ENDOSCOPY N/A 6/24/2020    Procedure: ESOPHAGOGASTRODUODENOSCOPY WED appt please;  Surgeon: Julián Maldonado MD;  Location: [facility] ENDOSCOPY;  Service: Gastroenterology;  Laterality: N/A;   • ENDOSCOPY N/A 6/10/2022     Procedure: ESOPHAGOGASTRODUODENOSCOPY   room 380;  Surgeon: Jeremiah Wilkins MD;  Location: [facility] ENDOSCOPY;  Service: Gastroenterology;  Laterality: N/A;   • ENDOSCOPY AND COLONOSCOPY     • FOOT SURGERY      Toes   • FOOT SURGERY     • GASTRIC BANDING      Revision, laparoscopic   • HYSTERECTOMY     • INCISION AND DRAINAGE LEG Left 3/12/2021    Procedure: Left ankle arthroscopic irrigation and debridement, screw removal;  Surgeon: Ignacio Lord DPM;  Location: [facility] OR;  Service: Podiatry;  Laterality: Left;   • MOUTH SURGERY     • SALPINGO OOPHORECTOMY     • SHOULDER SURGERY     • SUBTALAR ARTHRODESIS Left 1/16/2019    Procedure: LEFT FOOT HARDWARE REMOVAL, FIFTH METATARSAL , OPEN REDUCTION INTERNAL FIXATION, CALCANEAL OSTEOTOMY;  Surgeon: Ignacio Lord DPM;  Location: [facility] OR;  Service: Podiatry   • SUBTALAR ARTHRODESIS Left 10/16/2019    Procedure: foot hardware removal, subtalar joint fusion  possible de/reattachment of achilles tendon        (c-arm);  Surgeon: Ignacio Lord DPM;  Location: [facility] OR;  Service: Podiatry   • SUBTALAR ARTHRODESIS Left 9/30/2020    Procedure: subtalar, talonavicular joint arthrodesis.  Removal hardware.          (c-arm);  Surgeon: Ignacio Lord DPM;  Location: [facility] OR;  Service: Podiatry;  Laterality: Left;   • TRANSESOPHAGEAL ECHOCARDIOGRAM (LAMONTE)      With color flow       Family History   Problem Relation Age of Onset   • Diabetes Other    • Heart disease Other    • Hypertension Other    • Heart disease Mother    • Stroke Mother    • Hypertension Mother    • Diabetes Sister    • Heart disease Sister    • Hypertension Sister    • Heart disease Sister    • Diabetes Sister    • Hypertension Sister    • Diabetes Sister    • Diabetes Sister    • Diabetes Sister    • Diabetes Sister        Social History     Socioeconomic History   • Marital status:    Tobacco Use   • Smoking status: Never Smoker   • Smokeless tobacco: Never Used   Vaping Use    • Vaping Use: Never used   Substance and Sexual Activity   • Alcohol use: No   • Drug use: No   • Sexual activity: Defer           Objective   Physical Exam  Vitals and nursing note reviewed.   Constitutional:       Appearance: She is not ill-appearing.      Comments: Elevated BMI.  Mild edema of the hands and ankles.   HENT:      Head: Normocephalic and atraumatic.   Neck:      Vascular: No JVD.   Cardiovascular:      Rate and Rhythm: Normal rate and regular rhythm.      Pulses:           Radial pulses are 2+ on the right side and 2+ on the left side.      Heart sounds: Normal heart sounds.   Pulmonary:      Effort: Pulmonary effort is normal.      Breath sounds: Normal breath sounds.   Chest:      Chest wall: No tenderness.   Abdominal:      Tenderness: There is no abdominal tenderness.   Musculoskeletal:      Right lower leg: No tenderness.      Left lower leg: No tenderness.   Skin:     General: Skin is warm and dry.   Neurological:      Mental Status: She is alert and oriented to person, place, and time.   Psychiatric:         Behavior: Behavior normal.         Procedures           ED Course                      HEART Score: 5                      MDM  Number of Diagnoses or Management Options     Amount and/or Complexity of Data Reviewed  Clinical lab tests: reviewed  Tests in the radiology section of CPT®: reviewed  Tests in the medicine section of CPT®: reviewed  Decide to obtain previous medical records or to obtain history from someone other than the patient: yes  Review and summarize past medical records: yes  Discuss the patient with other providers: yes      EKG interpretation: Interpreted by myself.  Normal sinus rhythm.  Rate 64.  Normal P wave and NC interval.  Normal QRS and axis.  Normal QTc interval.  ST segments are nonspecific.    The patient had recent hospitalization and PCP visit in which blood pressure medications were adjusted.  According to the patient her kidneys were weakening and so  her Lasix was discontinued which is resulted in some increased edema.  She experiencing chest discomfort tonight and came into the hospital for further evaluation.  Will conduct a screening cardiac work-up and provide the patient some Lasix.  Given her elevated heart score we will share medical decision making and she will be offered chest pain observation in the hospital.  She does mention having a upcoming appointment in a couple of days with her cardiologist.    Final reevaluation the patient's serial troponins are negative however she continues to have occasional chest discomfort.  Given her elevated heart score we shared medical decision making and she is wanting to stay in the hospital.  Will call hospitalist.  Will provide additional medication.  She appears to be in stable condition.  Final diagnoses:   Chest pain, unspecified type       ED Disposition  ED Disposition     ED Disposition   Decision to Admit    Condition   --    Comment   Level of Care: Telemetry [5]   Diagnosis: Chest pain [930908]   Admitting Physician: BEHROOZI, SAEID [[id number]]   Attending Physician: BEHROOZI, SAEID [[id number]]               No follow-up provider specified.       Medication List      No changes were made to your prescriptions during this visit.          Julián De Leon,   07/04/22 0613

## 2022-07-05 ENCOUNTER — HOME CARE VISIT (OUTPATIENT)
Dept: HOME HEALTH SERVICES | Facility: HOME HEALTHCARE | Age: 62
End: 2022-07-05

## 2022-07-05 ENCOUNTER — APPOINTMENT (OUTPATIENT)
Dept: GENERAL RADIOLOGY | Facility: HOSPITAL | Age: 62
End: 2022-07-05

## 2022-07-05 PROBLEM — R07.9 CHEST PAIN, UNSPECIFIED TYPE: Status: ACTIVE | Noted: 2022-07-05

## 2022-07-05 LAB
GLUCOSE BLDC GLUCOMTR-MCNC: 202 MG/DL (ref 70–130)
GLUCOSE BLDC GLUCOMTR-MCNC: 225 MG/DL (ref 70–130)
GLUCOSE BLDC GLUCOMTR-MCNC: 247 MG/DL (ref 70–130)
GLUCOSE BLDC GLUCOMTR-MCNC: 332 MG/DL (ref 70–130)
POTASSIUM SERPL-SCNC: 4.3 MMOL/L (ref 3.5–5.2)

## 2022-07-05 PROCEDURE — 25010000002 ONDANSETRON PER 1 MG: Performed by: NURSE PRACTITIONER

## 2022-07-05 PROCEDURE — 25010000002 FUROSEMIDE PER 20 MG: Performed by: INTERNAL MEDICINE

## 2022-07-05 PROCEDURE — 84132 ASSAY OF SERUM POTASSIUM: CPT | Performed by: INTERNAL MEDICINE

## 2022-07-05 PROCEDURE — 63710000001 INSULIN DETEMIR PER 5 UNITS: Performed by: INTERNAL MEDICINE

## 2022-07-05 PROCEDURE — 96375 TX/PRO/DX INJ NEW DRUG ADDON: CPT

## 2022-07-05 PROCEDURE — 99214 OFFICE O/P EST MOD 30 MIN: CPT | Performed by: INTERNAL MEDICINE

## 2022-07-05 PROCEDURE — 74018 RADEX ABDOMEN 1 VIEW: CPT

## 2022-07-05 PROCEDURE — 96372 THER/PROPH/DIAG INJ SC/IM: CPT

## 2022-07-05 PROCEDURE — 25010000002 ENOXAPARIN PER 10 MG: Performed by: INTERNAL MEDICINE

## 2022-07-05 PROCEDURE — 63710000001 INSULIN ASPART PER 5 UNITS: Performed by: PHYSICIAN ASSISTANT

## 2022-07-05 PROCEDURE — G0378 HOSPITAL OBSERVATION PER HR: HCPCS

## 2022-07-05 PROCEDURE — 97162 PT EVAL MOD COMPLEX 30 MIN: CPT

## 2022-07-05 PROCEDURE — 82962 GLUCOSE BLOOD TEST: CPT

## 2022-07-05 PROCEDURE — 96376 TX/PRO/DX INJ SAME DRUG ADON: CPT

## 2022-07-05 RX ORDER — POLYETHYLENE GLYCOL 3350 17 G/17G
17 POWDER, FOR SOLUTION ORAL DAILY
Status: DISCONTINUED | OUTPATIENT
Start: 2022-07-05 | End: 2022-07-07 | Stop reason: HOSPADM

## 2022-07-05 RX ORDER — FUROSEMIDE 10 MG/ML
40 INJECTION INTRAMUSCULAR; INTRAVENOUS DAILY
Status: DISCONTINUED | OUTPATIENT
Start: 2022-07-05 | End: 2022-07-06

## 2022-07-05 RX ORDER — TRAMADOL HYDROCHLORIDE 50 MG/1
50 TABLET ORAL EVERY 8 HOURS PRN
Status: DISCONTINUED | OUTPATIENT
Start: 2022-07-05 | End: 2022-07-07 | Stop reason: HOSPADM

## 2022-07-05 RX ADMIN — INSULIN ASPART 5 UNITS: 100 INJECTION, SOLUTION INTRAVENOUS; SUBCUTANEOUS at 17:11

## 2022-07-05 RX ADMIN — SUCRALFATE 1 G: 1 TABLET ORAL at 17:11

## 2022-07-05 RX ADMIN — INSULIN ASPART 5 UNITS: 100 INJECTION, SOLUTION INTRAVENOUS; SUBCUTANEOUS at 07:49

## 2022-07-05 RX ADMIN — AMLODIPINE BESYLATE 5 MG: 5 TABLET ORAL at 07:53

## 2022-07-05 RX ADMIN — SUCRALFATE 1 G: 1 TABLET ORAL at 10:50

## 2022-07-05 RX ADMIN — RANOLAZINE 500 MG: 500 TABLET, FILM COATED, EXTENDED RELEASE ORAL at 20:39

## 2022-07-05 RX ADMIN — METOCLOPRAMIDE 10 MG: 10 TABLET ORAL at 11:31

## 2022-07-05 RX ADMIN — FUROSEMIDE 40 MG: 10 INJECTION, SOLUTION INTRAMUSCULAR; INTRAVENOUS at 17:10

## 2022-07-05 RX ADMIN — GABAPENTIN 100 MG: 100 CAPSULE ORAL at 07:53

## 2022-07-05 RX ADMIN — INSULIN DETEMIR 25 UNITS: 100 INJECTION, SOLUTION SUBCUTANEOUS at 20:40

## 2022-07-05 RX ADMIN — INSULIN ASPART 5 UNITS: 100 INJECTION, SOLUTION INTRAVENOUS; SUBCUTANEOUS at 10:50

## 2022-07-05 RX ADMIN — GABAPENTIN 100 MG: 100 CAPSULE ORAL at 20:39

## 2022-07-05 RX ADMIN — CLOPIDOGREL BISULFATE 75 MG: 75 TABLET ORAL at 07:54

## 2022-07-05 RX ADMIN — ONDANSETRON 4 MG: 2 INJECTION INTRAMUSCULAR; INTRAVENOUS at 14:58

## 2022-07-05 RX ADMIN — ATORVASTATIN CALCIUM 80 MG: 40 TABLET, FILM COATED ORAL at 07:53

## 2022-07-05 RX ADMIN — SUCRALFATE 1 G: 1 TABLET ORAL at 20:39

## 2022-07-05 RX ADMIN — ONDANSETRON 4 MG: 2 INJECTION INTRAMUSCULAR; INTRAVENOUS at 07:46

## 2022-07-05 RX ADMIN — ONDANSETRON 4 MG: 2 INJECTION INTRAMUSCULAR; INTRAVENOUS at 22:05

## 2022-07-05 RX ADMIN — METOPROLOL SUCCINATE 50 MG: 50 TABLET, EXTENDED RELEASE ORAL at 07:54

## 2022-07-05 RX ADMIN — ENOXAPARIN SODIUM 40 MG: 40 INJECTION SUBCUTANEOUS at 07:51

## 2022-07-05 RX ADMIN — Medication 10 ML: at 08:36

## 2022-07-05 RX ADMIN — Medication 10 ML: at 20:39

## 2022-07-05 RX ADMIN — SUCRALFATE 1 G: 1 TABLET ORAL at 07:50

## 2022-07-05 RX ADMIN — ASPIRIN 325 MG: 325 TABLET, COATED ORAL at 07:53

## 2022-07-05 RX ADMIN — FOLIC ACID 1000 MCG: 1 TABLET ORAL at 07:52

## 2022-07-05 RX ADMIN — POLYETHYLENE GLYCOL 3350 17 G: 17 POWDER, FOR SOLUTION ORAL at 17:11

## 2022-07-05 RX ADMIN — RANOLAZINE 500 MG: 500 TABLET, FILM COATED, EXTENDED RELEASE ORAL at 07:52

## 2022-07-05 RX ADMIN — TRAMADOL HYDROCHLORIDE 50 MG: 50 TABLET, COATED ORAL at 20:39

## 2022-07-05 RX ADMIN — ISOSORBIDE MONONITRATE 30 MG: 30 TABLET, EXTENDED RELEASE ORAL at 07:54

## 2022-07-05 RX ADMIN — ACETAMINOPHEN 650 MG: 325 TABLET, FILM COATED ORAL at 11:31

## 2022-07-05 RX ADMIN — PANTOPRAZOLE SODIUM 40 MG: 40 TABLET, DELAYED RELEASE ORAL at 07:53

## 2022-07-05 RX ADMIN — METOCLOPRAMIDE 10 MG: 10 TABLET ORAL at 17:11

## 2022-07-05 NOTE — THERAPY EVALUATION
Patient Name: Ariana Martinez  : 1960    MRN: [id number]                              Today's Date: 2022       Admit Date: 2022    Visit Dx:     ICD-10-CM ICD-9-CM   1. Chest pain, unspecified type  R07.9 786.50   2. Impaired physical mobility  Z74.09 781.99     Patient Active Problem List   Diagnosis   • 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, with long-term current use of insulin   • Closed nondisplaced fracture of fifth left metatarsal bone   • MAURICIO (generalized anxiety disorder)   • Depression, major, recurrent, moderate ([facility])   • GERD without esophagitis   • Long term prescription opiate use   • Mixed hyperlipidemia   • Vitamin D deficiency   • Seasonal allergic rhinitis   • Restrictive lung disease secondary to obesity   • Adult body mass index 37.0-37.9   • Snoring   • Class 3 severe obesity due to excess calories without serious comorbidity with body mass index (BMI) of 40.0 to 44.9 in adult ([facility])   • (HFpEF) heart failure with preserved ejection fraction ([facility])   • Type 2 diabetes mellitus with hyperglycemia, with long-term current use of insulin ([facility])   • Cyanocobalamin deficiency   • Coronary artery disease of native artery of native heart with stable angina pectoris ([facility])   • Hypertension   • Meniere's disease   • Gastroparesis   • Pulmonary hypertension ([facility])   • Pes cavus   • Primary osteoarthritis involving multiple joints   • Generalized anxiety disorder   • Chronic right-sided low back pain with right-sided sciatica   • Chest pain   • Adverse effect of iron   • Chest pain due to myocardial ischemia   • Nonrheumatic tricuspid valve regurgitation   • Iron deficiency anemia due to chronic blood loss   • Malaise and fatigue   • Ankle arthritis   • Urinary retention   • Endocarditis   • Essential hypertension   • Occlusion and stenosis of bilateral carotid arteries   • S/P BKA (below knee amputation) unilateral, left ([facility])   • Phantom pain after amputation of  lower extremity ([facility])   • S/P CABG (coronary artery bypass graft)   • Severe malnutrition ([facility])   • TIA (transient ischemic attack)   • Coronary artery abnormality   • Elevated d-dimer   • Neuropathy   • Chest pain, unspecified type     Past Medical History:   Diagnosis Date   • Acute bacterial endocarditis 3/13/2021   • Angina, class IV ([facility])    • Anxiety    • Anxiety and depression    • Arthritis    • Benign paroxysmal positional vertigo    • Bladder disorder     has bladder stimulator   • Carpal tunnel syndrome    • CHF (congestive heart failure) ([facility])    • Chronic pain    • Coronary atherosclerosis     hx CABG 2005.  is followed by Dr Kwon   • Depression    • Diabetes mellitus ([facility])     Type 2, controlled   • Diabetic polyneuropathy ([facility])    • Disease of thyroid gland    • Elevated cholesterol    • Female stress incontinence    • Foot pain, left    • Full dentures    • Gastroparesis    • GERD (gastroesophageal reflux disease)    • Hyperlipidemia    • Hypertension    • Low back pain    • Malaise and fatigue    • Multiple joint pain    • Obesity     Refuses to be weighed   • Occlusion and stenosis of bilateral carotid arteries 6/18/2021   • Otalgia     Both   • Perforation of tympanic membrane     Left   • Postoperative wound infection    • Vitamin D deficiency    • Wears glasses     reading     Past Surgical History:   Procedure Laterality Date   • ABDOMINAL SURGERY     • AMPUTATION FOOT     • ANGIOPLASTY      coronary   • ANKLE ARTHROSCOPY Left 2/26/2021    Procedure: Left foot hardware removal, ankle arthroscopy, bone grafting , left foot exostectomy;  Surgeon: Ignacio Lord DPM;  Location: [facility] OR;  Service: Podiatry;  Laterality: Left;   • BREAST BIOPSY Right    • CARDIAC CATHETERIZATION     • CARDIAC CATHETERIZATION N/A 6/20/2017    Procedure: Right Heart Cath;  Surgeon: Can Kwon MD PhD;  Location: [facility] CATH INVASIVE LOCATION;  Service:    • CARDIAC CATHETERIZATION N/A 2/18/2020     Procedure: Left Heart Cath;  Surgeon: Catalina Cooper MD;  Location: [facility] CATH INVASIVE LOCATION;  Service: Cardiology;  Laterality: N/A;   • CARDIAC CATHETERIZATION N/A 4/6/2022    Procedure: Left Heart Cath;  Surgeon: Sheryl Navas MD;  Location: [facility] CATH INVASIVE LOCATION;  Service: Cardiology;  Laterality: N/A;   • CARPAL TUNNEL RELEASE     • CHOLECYSTECTOMY     • COLONOSCOPY N/A 6/24/2020    Procedure: COLONOSCOPY;  Surgeon: Julián Maldonado MD;  Location: [facility] ENDOSCOPY;  Service: Gastroenterology;  Laterality: N/A;   • CORONARY ARTERY BYPASS GRAFT  2005   • ENDOSCOPY N/A 10/19/2018    Procedure: ESOPHAGOGASTRODUODENOSCOPY possible dilation;  Surgeon: Julián Maldonado MD;  Location: [facility] ENDOSCOPY;  Service: Gastroenterology   • ENDOSCOPY N/A 6/24/2020    Procedure: ESOPHAGOGASTRODUODENOSCOPY WED appt please;  Surgeon: Julián Maldonado MD;  Location: [facility] ENDOSCOPY;  Service: Gastroenterology;  Laterality: N/A;   • ENDOSCOPY N/A 6/10/2022    Procedure: ESOPHAGOGASTRODUODENOSCOPY   room 380;  Surgeon: Jeremiah Wilkins MD;  Location: [facility] ENDOSCOPY;  Service: Gastroenterology;  Laterality: N/A;   • ENDOSCOPY AND COLONOSCOPY     • FOOT SURGERY      Toes   • FOOT SURGERY     • GASTRIC BANDING      Revision, laparoscopic   • HYSTERECTOMY     • INCISION AND DRAINAGE LEG Left 3/12/2021    Procedure: Left ankle arthroscopic irrigation and debridement, screw removal;  Surgeon: Ignacio Lord DPM;  Location: [facility] OR;  Service: Podiatry;  Laterality: Left;   • MOUTH SURGERY     • SALPINGO OOPHORECTOMY     • SHOULDER SURGERY     • SUBTALAR ARTHRODESIS Left 1/16/2019    Procedure: LEFT FOOT HARDWARE REMOVAL, FIFTH METATARSAL , OPEN REDUCTION INTERNAL FIXATION, CALCANEAL OSTEOTOMY;  Surgeon: Ignacio Lord DPM;  Location: [facility] OR;  Service: Podiatry   • SUBTALAR ARTHRODESIS Left 10/16/2019    Procedure: foot hardware removal, subtalar joint fusion  possible de/reattachment of achilles  tendon        (c-arm);  Surgeon: Ignacio Lord DPM;  Location: [facility];  Service: Podiatry   • SUBTALAR ARTHRODESIS Left 9/30/2020    Procedure: subtalar, talonavicular joint arthrodesis.  Removal hardware.          (c-arm);  Surgeon: Ignacio Lord DPM;  Location: [facility];  Service: Podiatry;  Laterality: Left;   • TRANSESOPHAGEAL ECHOCARDIOGRAM (LAMONTE)      With color flow      General Information     Row Name 07/05/22 1556          Physical Therapy Time and Intention    Document Type evaluation  -AME (r) ML (t) AME (c)     Mode of Treatment individual therapy;physical therapy  -AME (r) ML (t) AME (c)     Row Name 07/05/22 1556          General Information    Patient Profile Reviewed yes  -AME (r) ML (t) AME (c)     Prior Level of Function independent:;feeding;grooming;dressing;bathing;dependent:;driving;cooking;cleaning  -AME (r) ML (t) AME (c)     Existing Precautions/Restrictions fall  -AME (r) ML (t) AME (c)     Barriers to Rehab previous functional deficit  -AME (r) ML (t) AME (c)     Row Name 07/05/22 1556          Living Environment    People in Home spouse  -AME (r) ML (t) AME (c)     Row Name 07/05/22 1556          Home Main Entrance    Number of Stairs, Main Entrance none  -AME (r) ML (t) AME (c)     Row Name 07/05/22 1556          Stairs Within Home, Primary    Stairs, Within Home, Primary uses a RW at home; has prosthesis; walk in shower with chair, safety bars; ramp to get inside; uses a wheelchair at night to go to bathroom  -AME (r) ML (t) AME (c)     Number of Stairs, Within Home, Primary none  -AME (r) ML (t) AME (c)     Row Name 07/05/22 1556          Cognition    Orientation Status (Cognition) oriented x 4  -AME (r) ML (t) AME (c)           User Key  (r) = Recorded By, (t) = Taken By, (c) = Cosigned By    Initials Name Provider Type    AME Aure Villaseñor, PT Physical Therapist    Roque Hawkins PT Student PT Student               Mobility     Row Name 07/05/22 1553          Bed Mobility    Bed  Mobility scooting/bridging;supine-sit;sit-supine  -AME (r) ML (t) AME (c)     Scooting/Bridging [place] (Bed Mobility) modified independence  -AME (r) ML (t) AME (c)     Supine-Sit [place] (Bed Mobility) modified independence  -AME (r) ML (t) AME (c)     Sit-Supine [place] (Bed Mobility) modified independence  -AME (r) ML (t) AME (c)     Assistive Device (Bed Mobility) bed rails;head of bed elevated  -AME (r) ML (t) AME (c)           User Key  (r) = Recorded By, (t) = Taken By, (c) = Cosigned By    Initials Name Provider Type    Aure Shah, PT Physical Therapist    Roque Hawkins, PT Student PT Student               Obj/Interventions     Row Name 07/05/22 1556          Range of Motion Comprehensive    General Range of Motion bilateral lower extremity ROM WFL;bilateral upper extremity ROM WFL  -AME (r) ML (t) AME (c)     Row Name 07/05/22 1556          Strength Comprehensive (MMT)    General Manual Muscle Testing (MMT) Assessment other (see comments)  -AME (r) ML (t) AME (c)     Comment, General Manual Muscle Testing (MMT) Assessment BLE grossly 4+/5, BUE 4+/5 grossly  -AME (r) ML (t) AME (c)     Row Name 07/05/22 1556          Sensory Assessment (Somatosensory)    Sensory Assessment (Somatosensory) UE sensation intact;LE sensation intact  -AME (r) ML (t) AME (c)           User Key  (r) = Recorded By, (t) = Taken By, (c) = Cosigned By    Initials Name Provider Type    Aure Shah, PT Physical Therapist    Roque Hawkins, PT Student PT Student               Goals/Plan     Row Name 07/05/22 1557          Bed Mobility Goal 1 (PT)    Activity/Assistive Device (Bed Mobility Goal 1, PT) bridging;rolling to left;rolling to right;scooting;sit to supine;supine to sit  -AME (r) ML (t) AME (c)     [place] Level/Cues Needed (Bed Mobility Goal 1, PT) independent  -AME (r) ML (t) AME (c)     Time Frame (Bed Mobility Goal 1, PT) by discharge  -AME (r) ML (t) AME (c)     Progress/Outcomes (Bed Mobility Goal 1, PT)  goal not met  -AME (r) ML (t) AME (c)     Row Name 07/05/22 1556          Transfer Goal 1 (PT)    Activity/Assistive Device (Transfer Goal 1, PT) sit-to-stand/stand-to-sit;bed-to-chair/chair-to-bed;toilet;walker, rolling  -AME (r) ML (t) AME (c)     [place] Level/Cues Needed (Transfer Goal 1, PT) modified independence  -AME (r) ML (t) AME (c)     Time Frame (Transfer Goal 1, PT) by discharge  -AME (r) ML (t) AME (c)     Progress/Outcome (Transfer Goal 1, PT) goal not met  -AME (r) ML (t) AME (c)     Row Name 07/05/22 1556          Gait Training Goal 1 (PT)    Activity/Assistive Device (Gait Training Goal 1, PT) gait (walking locomotion);assistive device use;walker, rolling  -AME (r) ML (t) AME (c)     [place] Level (Gait Training Goal 1, PT) modified independence  -AME (r) ML (t) AME (c)     Distance (Gait Training Goal 1, PT) 10'x2  -AME (r) ML (t) AME (c)     Time Frame (Gait Training Goal 1, PT) by discharge  -AME (r) ML (t) AME (c)     Progress/Outcome (Gait Training Goal 1, PT) goal not met  -AME (r) ML (t) AME (c)     Row Name 07/05/22 1556          Therapy Assessment/Plan (PT)    Planned Therapy Interventions (PT) balance training;bed mobility training;gait training;home exercise program;motor coordination training;neuromuscular re-education;patient/family education;postural re-education;prosthetic fitting/training;strengthening;stretching;transfer training;vestibular therapy;wheelchair management/propulsion training;wound care  -AME (r) ML (t) AME (c)           User Key  (r) = Recorded By, (t) = Taken By, (c) = Cosigned By    Initials Name Provider Type    Aure Shah, PT Physical Therapist    Roque Hawkins, PT Student PT Student               Clinical Impression     Row Name 07/05/22 1556          Pain    Pretreatment Pain Rating 7/10  -AME (r) ML (t) AME (c)     Pain Location - abdomen;head  -AME (r) ML (t) AME (c)     Pain Intervention(s) Distraction;Ambulation/increased activity;Repositioned  -AME (r) ML (t) AME  (c)     Row Name 07/05/22 1556          Plan of Care Review    Plan of Care Reviewed With patient  -AME (r) ML (t) AME (c)     Outcome Evaluation PT initial evaluation complete. Pt c/o nausea as well as headache at this time, but pleasant and agreeable to therapy. Pt presents with WFL for UE/LE ROM and strength. Modified independence for bed mobility with HOB elevated and use of bed rails. Pt defers standing at this time since she does not have her prosthesis here with her, but states her  will bring it for her to use tomorrow. Cont inpatient PT in order to address deficits in strength, balance, conditioning, as well as ADL deficits. Anticipate d/c home with assist from  and HHPT.  -AME (r) ML (t) AME (c)     Row Name 07/05/22 1550          Therapy Assessment/Plan (PT)    Rehab Potential (PT) good, to achieve stated therapy goals  -AME (r) ML (t) AME (c)     Criteria for Skilled Interventions Met (PT) yes;meets criteria;skilled treatment is necessary  -AME (r) ML (t) AME (c)     Therapy Frequency (PT) other (see comments)  5-7 d/wk  -AME (r) ML (t) AME (c)     Predicted Duration of Therapy Intervention (PT) Until all goals met or d/c  -AME (r) ML (t) AME (c)     Row Name 07/05/22 1559          Vital Signs    Pre Systolic BP Rehab 132  -AME     Pre Treatment Diastolic BP 66  most recent posted at time of eval  -AME     Post Systolic BP Rehab 152  -AME (r) ML (t) AME (c)     Post Treatment Diastolic BP 72  -AME (r) ML (t) AME (c)     Pretreatment Heart Rate (beats/min) 67  -AME (r) ML (t) AME (c)     Intratreatment Heart Rate (beats/min) 71  -AME (r) ML (t) AME (c)     Pre SpO2 (%) 97  -AME (r) ML (t) AME (c)     O2 Delivery Pre Treatment supplemental O2  1L  -AME (r) ML (t) AME (c)     Intra SpO2 (%) 97  -AME (r) ML (t) AME (c)     O2 Delivery Intra Treatment nasal cannula  -AME     O2 Delivery Post Treatment supplemental O2  -AME (r) ML (t) AME (c)     Pre Patient Position Supine  -AME (r) ML (t) AME (c)     Intra Patient Position  Sitting  -AME (r) ML (t) AME (c)     Post Patient Position Supine  -AME (r) ML (t) AME (c)     Row Name 07/05/22 2446          Positioning and Restraints    Pre-Treatment Position in bed  -AME (r) ML (t) AME (c)     Post Treatment Position bed  -AME (r) ML (t) AME (c)     In Bed call light within reach;encouraged to call for assist;supine  -AME (r) ML (t) AME (c)           User Key  (r) = Recorded By, (t) = Taken By, (c) = Cosigned By    Initials Name Provider Type    Aure Shah, PT Physical Therapist    Roque Hawkins, PT Student PT Student               Outcome Measures     Row Name 07/05/22 1622          How much help from another person do you currently need...    Turning from your back to your side while in flat bed without using bedrails? 3  -AME (r) ML (t) AME (c)     Moving from lying on back to sitting on the side of a flat bed without bedrails? 3  -AME (r) ML (t) AME (c)     Moving to and from a bed to a chair (including a wheelchair)? 3  -AME (r) ML (t) AME (c)     Standing up from a chair using your arms (e.g., wheelchair, bedside chair)? 3  -AME (r) ML (t) AME (c)     Climbing 3-5 steps with a railing? 2  -AME (r) ML (t) AME (c)     To walk in hospital room? 3  -AME (r) ML (t) AME (c)     AM-PAC 6 Clicks Score (PT) 17  -AME (r) ML (t)     Highest level of mobility 5 --> Static standing  -AME (r) ML (t)     Row Name 07/05/22 1622          Functional Assessment    Outcome Measure Options AM-PAC 6 Clicks Basic Mobility (PT)  -AME (r) ML (t) AME (c)           User Key  (r) = Recorded By, (t) = Taken By, (c) = Cosigned By    Initials Name Provider Type    Aure Shah, PT Physical Therapist    Roque Hawkins, PT Student PT Student                             Physical Therapy Education                 Title: PT OT SLP Therapies (In Progress)     Topic: Physical Therapy (Not Started)     Point: Mobility training (Not Started)     Learner Progress:  Not documented in this visit.          Point: Home exercise program  (Not Started)     Learner Progress:  Not documented in this visit.          Point: Body mechanics (Not Started)     Learner Progress:  Not documented in this visit.          Point: Precautions (Not Started)     Learner Progress:  Not documented in this visit.                          PT Recommendation and Plan           Time Calculation:    PT Charges     Row Name 07/05/22 1639             Time Calculation    Start Time 1550  -AME      Stop Time 1628  -AME      Time Calculation (min) 38 min  -AME      PT Received On 07/05/22  -      PT Goal Re-Cert Due Date 07/18/22  -              Time Calculation- PT    Total Timed Code Minutes- PT 0 minute(s)  -AME              Untimed Charges    PT Eval/Re-eval Minutes 38  -AME              Total Minutes    Untimed Charges Total Minutes 38  -AME       Total Minutes 38  -AME            User Key  (r) = Recorded By, (t) = Taken By, (c) = Cosigned By    Initials Name Provider Type    AME Aure Villaseñor, PT Physical Therapist              Therapy Charges for Today     Code Description Service Date Service Provider Modifiers Qty    56527687341 HC PT EVAL MOD COMPLEXITY 3 7/5/2022 Aure Villaseñor, PT GP 1          PT G-Codes  Outcome Measure Options: AM-PAC 6 Clicks Basic Mobility (PT)  AM-PAC 6 Clicks Score (PT): 17    Aure Villaseñor PT  7/5/2022

## 2022-07-05 NOTE — PROGRESS NOTES
[facility] Medicine Services  INPATIENT PROGRESS NOTE    Length of Stay: 0  Date of Admission: 7/4/2022  Primary Care Physician: Marty, BEBE Luu    Subjective     Patient seen and evaluated this morning, complains of continued abdominal discomfort and nausea.  Patient have not had p.o. intake today.  Patient also reports not having a bowel movement the past 24 hours.  Patient was admitted for chest pain seen by cardiology and her chest pain ruled out as noncardiac.  Patient also has a history of gastroparesis, gastritis and esophageal ulcer, currently taking Protonix and Carafate.  No acute events reported overnight.  And she is chest pain-free this morning.    Objective    Temp:  [97.3 °F (36.3 °C)-98.1 °F (36.7 °C)] 97.3 °F (36.3 °C)  Heart Rate:  [66-74] 71  Resp:  [16] 16  BP: (138-166)/(56-78) 162/78    Physical Exam  Constitutional:       General: She is not in acute distress.     Appearance: Normal appearance.      Comments: Morbidly obese   HENT:      Head: Normocephalic and atraumatic.      Nose: Nose normal.      Mouth/Throat:      Mouth: Mucous membranes are moist.   Eyes:      Extraocular Movements: Extraocular movements intact.      Pupils: Pupils are equal, round, and reactive to light.   Cardiovascular:      Rate and Rhythm: Normal rate and regular rhythm.      Pulses: Normal pulses.   Pulmonary:      Effort: Pulmonary effort is normal.      Breath sounds: Normal breath sounds.   Abdominal:      Palpations: Abdomen is soft.      Comments: Obesity, difficult to auscultate bowel sounds.  No guarding or rebound.   Musculoskeletal:         General: Normal range of motion.      Cervical back: Normal range of motion and neck supple.      Right lower leg: Edema present.      Comments: Trace  Left BKA   Skin:     General: Skin is warm and dry.   Neurological:      General: No focal deficit present.      Mental Status: She is alert and oriented to  person, place, and time.   Psychiatric:         Mood and Affect: Mood normal.             Results Review:  I have reviewed the labs, radiology results, and diagnostic studies.    Laboratory Data:   Results from last 7 days   Lab Units 07/05/22  0218 07/04/22  0152   SODIUM mmol/L  --  143   POTASSIUM mmol/L 4.3 3.1*   CHLORIDE mmol/L  --  106   CO2 mmol/L  --  26.0   BUN mg/dL  --  6*   CREATININE mg/dL  --  0.80   GLUCOSE mg/dL  --  137*   CALCIUM mg/dL  --  8.7   BILIRUBIN mg/dL  --  0.4   ALK PHOS U/L  --  98   ALT (SGPT) U/L  --  12   AST (SGOT) U/L  --  12   ANION GAP mmol/L  --  11.0     Estimated Creatinine Clearance: 103.3 mL/min (by C-G formula based on SCr of 0.8 mg/dL).          Results from last 7 days   Lab Units 07/04/22  0152   WBC 10*3/mm3 12.50*   HEMOGLOBIN g/dL 11.7*   HEMATOCRIT % 34.1   PLATELETS 10*3/mm3 427           Culture Data:   No results found for: BLOODCX  No results found for: URINECX  No results found for: RESPCX  No results found for: WOUNDCX  No results found for: STOOLCX  No components found for: BODYFLD    Radiology Data:   Imaging Results (Last 24 Hours)     ** No results found for the last 24 hours. **          I have reviewed the patient's current medications.     Assessment/Plan     Atypical chest pain  Nausea  History of esophageal ulcer  History of gastritis  History of gastroparesis  Morbid obese    Plan:  Patient seen with  at bedside.  Patient reports previously on Lasix, reports this was discontinued prior to her recent hospitalization.  Patient wishes to be back on lasix indicating that she has increased swelling.  Patient does have trace pitting edema.   -Will start on IV lasix 40mg today.   -Reevaluate daily for need for diuresing.  Patient continues to have abdominal discomfort and nausea with benign physical exam.  - Continue Protonix and Carafate  - Will give GI cocktail as needed  - Consider KUB if abdominal pain and nausea persist.  - Will give stool  softeners to induce bowel movement.  - Encourage patient to ambulate  - Wean off oxygen.  - Patient asking for IV pain medication, unclear if pain seeking is contributory.  We will continue monitoring.      Continue DVT and GI prophylaxis.  Patient is a full code.  Possible discharge in next 24 hours if patient continues to improve.          Suzanne Valerio MD

## 2022-07-05 NOTE — THERAPY TREATMENT NOTE
Outpatient Physical Therapy Ortho Treatment Note  [facility]     Patient Name: Ariana Martinez  : 1960  MRN: [id number]  Today's Date: 2019      Visit Date: 2019   Attendance:   Subjective improvement:40%  Recert:19  MD Appointment: 19      Visit Dx:    ICD-10-CM ICD-9-CM   1. Unsteady gait R26.81 781.2   2. Pes cavus Q66.7 736.73   3. Closed nondisplaced fracture of fifth metatarsal bone of left foot, sequela S92.355S 905.4       Patient Active Problem List   Diagnosis   • 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, with long-term current use of insulin (CMS/[facility])   • Closed nondisplaced fracture of fifth left metatarsal bone   • MAURICIO (generalized anxiety disorder)   • Depression, major, recurrent, moderate (CMS/HCC)   • GERD without esophagitis   • Long term prescription opiate use   • Mixed hyperlipidemia   • Vitamin D deficiency   • Seasonal allergic rhinitis   • Restrictive lung disease secondary to obesity   • Snoring   • Class 3 severe obesity due to excess calories without serious comorbidity with body mass index (BMI) of 40.0 to 44.9 in adult (CMS/HCC)   • (HFpEF) heart failure with preserved ejection fraction (CMS/HCC)   • Diabetic peripheral neuropathy associated with type 2 diabetes mellitus (CMS/HCC)   • Cyanocobalamin deficiency   • CAD (coronary artery disease)   • Hypertension   • Meniere's disease   • Gastroparesis   • Otitis media with effusion, left   • Pulmonary hypertension (CMS/HCC)   • Displaced fracture of fifth metatarsal bone, left foot, subsequent encounter for fracture with nonunion   • Pes cavus   • Primary osteoarthritis involving multiple joints   • Generalized anxiety disorder   • Chronic right-sided low back pain with right-sided sciatica   • Chest pain        Past Medical History:   Diagnosis Date   • Angina, class IV (CMS/HCC)    • Anxiety    • Benign paroxysmal positional vertigo    • Carpal tunnel  Patient informed. "syndrome    • Chronic pain    • Coronary atherosclerosis     hx CABG 2005.  is followed by Dr Kwon   • Depression    • Diabetes mellitus (CMS/HCC)     Type 2, controlled   • Diabetic polyneuropathy (CMS/HCC)    • Elevated cholesterol    • Female stress incontinence    • Gastroparesis    • GERD (gastroesophageal reflux disease)    • Hyperlipidemia    • Hypertension    • Low back pain    • Malaise and fatigue    • Multiple joint pain    • Obesity     Refuses to be weighed   • Otalgia     Both   • Perforation of tympanic membrane     Left   • Postoperative wound infection    • Vitamin D deficiency         Past Surgical History:   Procedure Laterality Date   • ABDOMINAL SURGERY     • ANGIOPLASTY      coronary   • BREAST BIOPSY Right    • CARDIAC CATHETERIZATION     • CARDIAC CATHETERIZATION N/A 6/20/2017    Procedure: Right Heart Cath;  Surgeon: Can Kwon MD PhD;  Location: [facility] CATH INVASIVE LOCATION;  Service:    • CARPAL TUNNEL RELEASE     • CHOLECYSTECTOMY     • CORONARY ARTERY BYPASS GRAFT  2005   • ENDOSCOPY N/A 10/19/2018    Procedure: ESOPHAGOGASTRODUODENOSCOPY possible dilation;  Surgeon: Julián Maldonado MD;  Location: [facility] ENDOSCOPY;  Service: Gastroenterology   • ENDOSCOPY AND COLONOSCOPY     • FOOT SURGERY      Toes   • GASTRIC BANDING      Revision, laparoscopic   • HYSTERECTOMY     • MOUTH SURGERY     • SALPINGO OOPHORECTOMY     • SHOULDER SURGERY     • SUBTALAR ARTHRODESIS Left 1/16/2019    Procedure: LEFT FOOT HARDWARE REMOVAL, FIFTH METATARSAL , OPEN REDUCTION INTERNAL FIXATION, CALCANEAL OSTEOTOMY;  Surgeon: Ignacio Lord DPM;  Location: [facility] OR;  Service: Podiatry   • TRANSESOPHAGEAL ECHOCARDIOGRAM (LAMONTE)      With color flow       PT Ortho     Row Name 07/29/19 0800       Subjective Comments    Subjective Comments  Pt is having a CT Scan of L foot 8/1/19. F/u with MD on 8/11/19. \"It still hurts, I guess it (heel pain) will always hurt\" Pt [place] and reports difficulty due to the ground being unlevel. Pt states she has a black spot on her R great toe, no known reason  -EM       Subjective Pain    Post-Treatment Pain Level  4  -EM       Posture/Observations    Posture/Observations Comments  Amb with mild antalgic gt. Mild TTP L calcaneus noted.  Small blood blister noted R medial great toe-Pt edu to monitor 2-3x/day and notifty MD of any worsening.   -EM      User Key  (r) = Recorded By, (t) = Taken By, (c) = Cosigned By    Initials Name Provider Type    EM Barry Gamboa PTA Physical Therapy Assistant                      PT Assessment/Plan     Row Name 07/29/19 0800          PT Assessment    Functional Limitations  Decreased safety during functional activities;Impaired gait;Limitation in home management;Performance in leisure activities;Limitations in community activities  -EM     Impairments  Endurance;Gait;Balance;Range of motion;Muscle strength  -EM     Assessment Comments  Pt cont to have significant deficits with balance activities, however, pt had improved times with tandem stance this tx. No goals met this tx.  -EM     Rehab Potential  Good  -EM     Patient/caregiver participated in establishment of treatment plan and goals  Yes  -EM     Patient would benefit from skilled therapy intervention  Yes  -EM        PT Plan    PT Frequency  2x/week  -EM     Predicted Duration of Therapy Intervention (Therapy Eval)  2 wks  -EM     PT Plan Comments  Mold custom orthotics next tx.  Monitor  possible blood blister R great toe.  -EM       User Key  (r) = Recorded By, (t) = Taken By, (c) = Cosigned By    Initials Name Provider Type    EM Barry Gamboa PTA Physical Therapy Assistant            Exercises     Row Name 07/29/19 0800             Subjective Comments    Subjective Comments  Pt is having a CT Scan of L foot 8/1/19. F/u with MD on 8/11/19. \"It still hurts, I guess it (heel pain) will always hurt\" Pt [place] and reports difficulty due to the ground being unlevel. Pt states she has a black spot on her R great toe, no known reason  -EM         Subjective Pain    Able to rate subjective pain?  yes  -EM      Pre-Treatment Pain Level  4  -EM      Post-Treatment Pain Level  4  -EM         Exercise 1    Exercise Name 1  PRO II-[place]-4.0  -EM      Time 1  10'  -EM         Exercise 2    Exercise Name 2  PROM  -EM      Time 2  6'  -EM         Exercise 3    Exercise Name 3  Incline Calf S  -EM      Sets 3  3  -EM      Time 3  30\"  -EM         Exercise 4    Exercise Name 4  St CR: Neutral, IR, ER  -EM      Sets 4  1  -EM      Reps 4  30  -EM         Exercise 5    Exercise Name 5  St TR  -EM      Sets 5  1  -EM      Reps 5  30  -EM         Exercise 6    Exercise Name 6  Tandem stance: L LE Fwd  -EM      Sets 6  1  -EM      Reps 6  3  -EM      Additional Comments  8\", 38\", 37\"  -EM         Exercise 7    Exercise Name 7  Tandem Stance: R LE fwd  -EM      Sets 7  1  -EM      Reps 7  3  -EM      Additional Comments  19\",22\", 31\"  -EM         Exercise 8    Exercise Name 8  Unable to perform Tandem gt  -EM        User Key  (r) = Recorded By, (t) = Taken By, (c) = Cosigned By    Initials Name Provider Type    EM Barry Gamboa PTA Physical Therapy Assistant                      Manual Rx (last 36 hours)      Manual Treatments     Row Name 07/29/19 0700             Manual Rx 1    Manual Rx 1 Location  L ankle  -EM      Manual Rx 1 Type  PROM: PF,DF, IV, EV  -EM      Manual Rx 1 Duration  6'  -EM        User Key  (r) = Recorded By, (t) = Taken By, (c) = Cosigned By    Initials Name Provider Type    EM Barry Gamboa PTA Physical Therapy Assistant          PT OP Goals     Row Name 07/29/19 0800          PT Short Term Goals    STG Date to Achieve  06/11/19  -EM     STG 1  Patient will be independent home exercise program  -EM     STG 1 Progress  Met  (Significant)   -EM     STG 2  Patient will have dorsiflexion 7 " degrees  -EM     STG 2 Progress  Progressing  -EM     STG 3  Patient plantarflexion 35 degrees  -EM     STG 3 Progress  Met  (Significant)   -EM     STG 4  Patient will have eversion 15 degrees  -EM     STG 4 Progress  Not Met  -EM     STG 5  Patient will have inversion 25 degrees  -EM     STG 5 Progress  Progressing  -EM        Long Term Goals    LTG 1  Patient had minimal tenderness to palpation of the posterior calcaneus  -EM     LTG 1 Progress  Progressing  -EM     LTG 2  Patient has minimal tenderness to palpation of the ATF  -EM     LTG 2 Progress  Met  (Significant)   -EM     LTG 3  Patient to have single leg stance on the left to 6 seconds  -EM     LTG 3 Progress  Ongoing  -EM        Time Calculation    PT Goal Re-Cert Due Date  08/09/19  -EM       User Key  (r) = Recorded By, (t) = Taken By, (c) = Cosigned By    Initials Name Provider Type    EM Barry Gamboa PTA Physical Therapy Assistant                         Time Calculation:   Start Time: 0802  Stop Time: 0844  Time Calculation (min): 42 min  Total Timed Code Minutes- PT: 42 minute(s)  Therapy Charges for Today     Code Description Service Date Service Provider Modifiers Qty    28288737977 HC PT THER PROC EA 15 MIN 7/29/2019 Barry Gamboa PTA GP 3                    Barry Gamboa PTA  7/29/2019

## 2022-07-05 NOTE — CASE COMMUNICATION
Patient is progressing well toward all goals achieved.  Patient is pleased with progress and feels safe and able to manage independently without concern.  Patient would benefit from 1x visit to finalize and ensure independence with HEP and MMT.

## 2022-07-05 NOTE — HOME HEALTH
Pt stated she is sick in her stomach this morning and doesn't feel up to doing much.  She is 90-95% improved.

## 2022-07-05 NOTE — PROGRESS NOTES
CARDIOLOGY PROGRESS NOTE      LOS: 0 days   Patient Care Team:  Marty, BEBE Luu as PCP - General (Family Medicine)  Uziel Alonso MD as Consulting Physician (Hematology and Oncology)  Elvis Gamboa APRN as Nurse Practitioner (Endocrinology)  Teressa Hammond APRN as Nurse Practitioner (Oncology)    Problems/Diagnoses:   Ariana Martinez is a 62 yr old female with coronary artery disease status post coronary artery bypass surgery in 2005, insulin-dependent type 2 diabetes mellitus with complications including gastroparesis, hypertension, anxiety/depression, hyperlipidemia, GERD, HFpEF,  vitamin D deficiency. She was treated for left foot pain and found to have hardware infection with MSSA and Acinetobacter.  She was treated for infective endocarditis of the mitral valve.      Patient has had multiple evaluations for chest pain and had a PCI done from her LAD into diagonal in 2020.  She underwent repeat cardiac catheterization in April 2022 for evaluation of chest pain and abnormal CT angiogram the coronary arteries and this showed that MESSINA to LAD was patent but the distal LAD had diffuse disease and not amenable to PCI.  Medical management was recommended.     She was reevaluated for chest pain last month and ruled out for myocardial infarction.  She underwent EGD which showed gastritis/esophageal ulcer and retention of food in the stomach suggesting gastroparesis and this was confirmed on gastric emptying study.  VQ scan was negative.      Subjective     Interval History: Patient continues to have chest pain at rest which is of a superficial nature.  It gets worse on taking a deep breath and she is tender to palpation on the chest.  She also has left upper quadrant pain.  She has noted fluctuating blood pressure during this admission.    Review of Systems:     Constitutional:  Denies recent weight loss, no change in exercise tolerance     HENT:  Denies any hearing loss, epistaxis, hoarseness, or  difficulty speaking.     Eyes: Wears eyeglasses or contact lenses     Respiratory:  Dyspnea with exertion, no cough, wheezing, or hemoptysis.     Cardiovascular: Intermittent chest pain.  No palpitation    Gastrointestinal: History of gastroparesis.  Gastritis/esophageal ulcer    Endocrine: Negative for cold intolerance, heat intolerance, polydipsia, polyphagia and polyuria.    Genitourinary: Negative.      Musculoskeletal: Left BKA    Objective     Vital Signs  Temp:  [97.3 °F (36.3 °C)-98.1 °F (36.7 °C)] 97.4 °F (36.3 °C)  Heart Rate:  [64-74] 68  Resp:  [16] 16  BP: (132-166)/(60-78) 132/66    Physical Exam:    Constitutional: Cooperative, alert and oriented,  in no acute distress.     HENT:   Head: Normocephalic, normal hair patterns, no masses or tenderness.  Ears, Nose, and Throat: No gross abnormalities. No pallor or cyanosis.   Eyes: EOMS intact, PERRL, conjunctivae and lids unremarkable. Fundoscopic exam and visual fields not performed.   Neck: No palpable masses or adenopathy, no thyromegaly, no JVD, carotid pulses are full and equal bilaterally and without  Bruits.     Cardiovascular: Regular rhythm, S1 and S2 normal, no S3 or S4. No murmurs, gallops, or rubs detected.     Pulmonary/Chest:     Chest: Normal symmetry, no tenderness to palpation, normal respiratory excursion, no intercostal retraction, no use of accessory muscles.    Pulmonary: Normal breath sounds. No rales or rhonchi.    Abdominal: Abdomen soft, bowel sounds normoactive, no masses, no hepatosplenomegaly, nontender, no bruit.     Musculoskeletal: Left BKA    Results Review:    Lab Results (last 24 hours)     Procedure Component Value Units Date/Time    POC Glucose Once [[id number]]  (Abnormal) Collected: 07/05/22 1614    Specimen: Blood Updated: 07/05/22 1654     Glucose 225 mg/dL      Comment: RN NotifiedOperator: [id number] PETE TELLEZMeter ID: [id number]       POC Glucose Once [[id number]]  (Abnormal) Collected: 07/05/22 1020     Specimen: Blood Updated: 07/05/22 1044     Glucose 247 mg/dL      Comment: RN NotifiedOperator: [id number] PETE SEOFERMeter ID: [id number]       POC Glucose Once [[id number]]  (Abnormal) Collected: 07/05/22 0618    Specimen: Blood Updated: 07/05/22 0703     Glucose 202 mg/dL      Comment: RN NotifiedOperator: [id number] SHERON FAITHMeter ID: [id number]       Potassium [[id number]]  (Normal) Collected: 07/05/22 0218    Specimen: Blood Updated: 07/05/22 0317     Potassium 4.3 mmol/L     POC Glucose Once [[id number]]  (Abnormal) Collected: 07/04/22 2052    Specimen: Blood Updated: 07/04/22 2105     Glucose 291 mg/dL      Comment: RN NotifiedOperator: [id number] SHERON FAITHMeter ID: [id number]           Imaging Results (Last 24 Hours)     Procedure Component Value Units Date/Time    XR Abdomen KUB [[id number]] Collected: 07/05/22 1518     Updated: 07/05/22 1657    Narrative:      Exam:  KUB    History: Abdominal pain. Chest pain.    Supine film of the abdomen was obtained at 3:13 PM.    Comparison: August 8, 2017    No mechanical bowel obstruction.  No organomegaly.  No abnormal calcifications.  No acute osseous abnormality.  Degenerative changes and degenerative disc disease lumbar spine.  Implanted stimulator device remains in place in the right lower  quadrant with electrode overlying the left sacral ala.  Cholecystectomy.  Coronary artery bypass.  Eventration/elevation right hemidiaphragm.      Impression:      Conclusion:  No acute process.  Postoperative changes and nonacute findings as above.    [id number]    Electronically signed by:  David Gamboa MD  7/5/2022 4:55 PM CDT  Workstation: [id number]          Medication Review:   Current Facility-Administered Medications   Medication Dose Route Frequency Provider Last Rate Last Admin   • acetaminophen (TYLENOL) tablet 650 mg  650 mg Oral Q6H PRN Behroozi, Saeid, MD   650 mg at 07/05/22 1131   • amLODIPine (NORVASC) tablet 5 mg  5 mg Oral Daily Behroozi, Saeid, MD    5 mg at 07/05/22 0753   • aspirin EC tablet 325 mg  325 mg Oral Daily Behroozi, Saeid, MD   325 mg at 07/05/22 0753   • atorvastatin (LIPITOR) tablet 80 mg  80 mg Oral Daily Behroozi, Saeid, MD   80 mg at 07/05/22 0753   • clopidogrel (PLAVIX) tablet 75 mg  75 mg Oral Daily Behroozi, Saeid, MD   75 mg at 07/05/22 0754   • dextrose (D50W) (25 g/50 mL) IV injection 25 g  25 g Intravenous Q15 Min PRN Jada Dobbins PA-C       • dextrose (GLUTOSE) oral gel 15 g  15 g Oral Q15 Min PRN Jada Dobbins PA-C       • Enoxaparin Sodium (LOVENOX) syringe 40 mg  40 mg Subcutaneous Q24H Behroozi, Saeid, MD   40 mg at 07/05/22 0751   • folic acid (FOLVITE) tablet 1,000 mcg  1,000 mcg Oral Daily Behroozi, Saeid, MD   1,000 mcg at 07/05/22 0752   • furosemide (LASIX) injection 40 mg  40 mg Intravenous Daily Suzanne Valerio MD   40 mg at 07/05/22 1710   • gabapentin (NEURONTIN) capsule 100 mg  100 mg Oral Q12H Behroozi, Saeid, MD   100 mg at 07/05/22 0753   • glucagon (human recombinant) (GLUCAGEN DIAGNOSTIC) injection 1 mg  1 mg Intramuscular Q15 Min PRN Jada Dobbins PA-C       • Insulin Aspart (novoLOG) injection 0-14 Units  0-14 Units Subcutaneous TID AC Jada Dobbins PA-C   5 Units at 07/05/22 1711   • insulin detemir (LEVEMIR) injection 25 Units  25 Units Subcutaneous Nightly Behroozi, Saeid, MD   25 Units at 07/04/22 2053   • isosorbide mononitrate (IMDUR) 24 hr tablet 30 mg  30 mg Oral Q24H Behroozi, Saeid, MD   30 mg at 07/05/22 0754   • metoclopramide (REGLAN) tablet 10 mg  10 mg Oral 4x Daily PRN Behroozi, Saeid, MD   10 mg at 07/05/22 1711   • metoprolol succinate XL (TOPROL-XL) 24 hr tablet 50 mg  50 mg Oral Daily Behroozi, Saeid, MD   50 mg at 07/05/22 0754   • nitroglycerin (NITROSTAT) SL tablet 0.4 mg  0.4 mg Sublingual Q5 Min PRN Behroozi, Saeid, MD       • ondansetron (ZOFRAN) injection 4 mg  4 mg Intravenous Q6H PRN Irina Watkins APRN   4 mg at 07/05/22 1452   • pantoprazole (PROTONIX) EC  tablet 40 mg  40 mg Oral Daily Behroozi, Saeid, MD   40 mg at 07/05/22 0753   • polyethylene glycol (MIRALAX) packet 17 g  17 g Oral Daily Suzanne Valerio MD   17 g at 07/05/22 1711   • potassium chloride (MICRO-K) CR capsule 40 mEq  40 mEq Oral PRN Levill, Irina G, APRN   40 mEq at 07/04/22 2219    Or   • potassium chloride (KLOR-CON) packet 40 mEq  40 mEq Oral PRN Levill, Irina G, APRN        Or   • potassium chloride 10 mEq in 100 mL IVPB  10 mEq Intravenous Q1H PRN Levill, Irina G, APRN       • ranolazine (RANEXA) 12 hr tablet 500 mg  500 mg Oral Q12H Behroozi, Saeid, MD   500 mg at 07/05/22 0752   • sodium chloride 0.9 % flush 10 mL  10 mL Intravenous Q12H Behroozi, Saeid, MD   10 mL at 07/05/22 0836   • sodium chloride 0.9 % flush 10 mL  10 mL Intravenous PRN Behroozi, Saeid, MD       • sucralfate (CARAFATE) tablet 1 g  1 g Oral 4x Daily AC & at Bedtime Behroozi, Saeid, MD   1 g at 07/05/22 1711         Assessment & Plan   Ariana Martinez is a 62-year-old female with multiple medical issues as discussed in detail in the history of present illness.  Her chest pain does  Atypical features and suspicion for ongoing ischemia is low.  Chest pain appears to be reproducible and most likely musculoskeletal.  She does have gastroparesis and this could be contributing to her symptoms.  She has documented gastritis/esophageal ulcer.    Agree with current management but would consider transitioning to p.o. Lasix tomorrow.  Continue antiplatelet therapy with aspirin and Plavix, antihypertensive therapy with amlodipine, metoprolol succinate, antianginal therapy with isosorbide mononitrate and Ranexa.  No further cardiac work-up indicated at this time.      Chest pain    Chest pain, unspecified type      Electronically signed by Bill Gibson MD, 07/05/22, 6:34 PM CDT.      Bill Gibson MD  07/05/22  18:21 CDT

## 2022-07-05 NOTE — PLAN OF CARE
Goal Outcome Evaluation:      PT evaluation completed. Pt agreeable to therapy. Modified independent with bed mobility. Deferred standing/ambulation as pt did not have prosthesis. Function limited by decreased strength and tolerance for functional mobility and activities. Pt will benefit from PT to regain lost function. Anticipate home with assistance at discharge with HHPT.

## 2022-07-06 ENCOUNTER — HOME CARE VISIT (OUTPATIENT)
Dept: HOME HEALTH SERVICES | Facility: HOME HEALTHCARE | Age: 62
End: 2022-07-06

## 2022-07-06 LAB
ANION GAP SERPL CALCULATED.3IONS-SCNC: 9 MMOL/L (ref 5–15)
BASOPHILS # BLD AUTO: 0.04 10*3/MM3 (ref 0–0.2)
BASOPHILS NFR BLD AUTO: 0.4 % (ref 0–1.5)
BUN SERPL-MCNC: 7 MG/DL (ref 8–23)
BUN/CREAT SERPL: 8.8 (ref 7–25)
CALCIUM SPEC-SCNC: 9.5 MG/DL (ref 8.6–10.5)
CHLORIDE SERPL-SCNC: 101 MMOL/L (ref 98–107)
CO2 SERPL-SCNC: 29 MMOL/L (ref 22–29)
CREAT SERPL-MCNC: 0.8 MG/DL (ref 0.57–1)
DEPRECATED RDW RBC AUTO: 41.7 FL (ref 37–54)
EGFRCR SERPLBLD CKD-EPI 2021: 84.5 ML/MIN/1.73
EOSINOPHIL # BLD AUTO: 0.44 10*3/MM3 (ref 0–0.4)
EOSINOPHIL NFR BLD AUTO: 4.9 % (ref 0.3–6.2)
ERYTHROCYTE [DISTWIDTH] IN BLOOD BY AUTOMATED COUNT: 13.2 % (ref 12.3–15.4)
GLUCOSE BLDC GLUCOMTR-MCNC: 216 MG/DL (ref 70–130)
GLUCOSE BLDC GLUCOMTR-MCNC: 223 MG/DL (ref 70–130)
GLUCOSE BLDC GLUCOMTR-MCNC: 250 MG/DL (ref 70–130)
GLUCOSE BLDC GLUCOMTR-MCNC: 284 MG/DL (ref 70–130)
GLUCOSE SERPL-MCNC: 252 MG/DL (ref 65–99)
HCT VFR BLD AUTO: 36 % (ref 34–46.6)
HGB BLD-MCNC: 12 G/DL (ref 12–15.9)
IMM GRANULOCYTES # BLD AUTO: 0.02 10*3/MM3 (ref 0–0.05)
IMM GRANULOCYTES NFR BLD AUTO: 0.2 % (ref 0–0.5)
LYMPHOCYTES # BLD AUTO: 2.22 10*3/MM3 (ref 0.7–3.1)
LYMPHOCYTES NFR BLD AUTO: 24.5 % (ref 19.6–45.3)
MCH RBC QN AUTO: 29.1 PG (ref 26.6–33)
MCHC RBC AUTO-ENTMCNC: 33.3 G/DL (ref 31.5–35.7)
MCV RBC AUTO: 87.2 FL (ref 79–97)
MONOCYTES # BLD AUTO: 0.63 10*3/MM3 (ref 0.1–0.9)
MONOCYTES NFR BLD AUTO: 7 % (ref 5–12)
NEUTROPHILS NFR BLD AUTO: 5.7 10*3/MM3 (ref 1.7–7)
NEUTROPHILS NFR BLD AUTO: 63 % (ref 42.7–76)
NRBC BLD AUTO-RTO: 0 /100 WBC (ref 0–0.2)
PLATELET # BLD AUTO: 441 10*3/MM3 (ref 140–450)
PMV BLD AUTO: 9.7 FL (ref 6–12)
POTASSIUM SERPL-SCNC: 4.1 MMOL/L (ref 3.5–5.2)
RBC # BLD AUTO: 4.13 10*6/MM3 (ref 3.77–5.28)
SODIUM SERPL-SCNC: 139 MMOL/L (ref 136–145)
WBC NRBC COR # BLD: 9.05 10*3/MM3 (ref 3.4–10.8)

## 2022-07-06 PROCEDURE — 80048 BASIC METABOLIC PNL TOTAL CA: CPT | Performed by: INTERNAL MEDICINE

## 2022-07-06 PROCEDURE — 82962 GLUCOSE BLOOD TEST: CPT

## 2022-07-06 PROCEDURE — 85025 COMPLETE CBC W/AUTO DIFF WBC: CPT | Performed by: INTERNAL MEDICINE

## 2022-07-06 PROCEDURE — G0378 HOSPITAL OBSERVATION PER HR: HCPCS

## 2022-07-06 PROCEDURE — 97116 GAIT TRAINING THERAPY: CPT

## 2022-07-06 PROCEDURE — 25010000002 FUROSEMIDE PER 20 MG: Performed by: INTERNAL MEDICINE

## 2022-07-06 PROCEDURE — 25010000002 ENOXAPARIN PER 10 MG: Performed by: INTERNAL MEDICINE

## 2022-07-06 PROCEDURE — 97110 THERAPEUTIC EXERCISES: CPT

## 2022-07-06 PROCEDURE — 25010000002 ONDANSETRON PER 1 MG: Performed by: NURSE PRACTITIONER

## 2022-07-06 PROCEDURE — 96372 THER/PROPH/DIAG INJ SC/IM: CPT

## 2022-07-06 PROCEDURE — 63710000001 INSULIN DETEMIR PER 5 UNITS: Performed by: INTERNAL MEDICINE

## 2022-07-06 PROCEDURE — 96376 TX/PRO/DX INJ SAME DRUG ADON: CPT

## 2022-07-06 PROCEDURE — 63710000001 INSULIN ASPART PER 5 UNITS: Performed by: PHYSICIAN ASSISTANT

## 2022-07-06 RX ORDER — VENLAFAXINE 75 MG/1
75 TABLET ORAL DAILY
Status: DISCONTINUED | OUTPATIENT
Start: 2022-07-06 | End: 2022-07-07 | Stop reason: HOSPADM

## 2022-07-06 RX ORDER — FUROSEMIDE 40 MG/1
40 TABLET ORAL DAILY
Status: DISCONTINUED | OUTPATIENT
Start: 2022-07-06 | End: 2022-07-07 | Stop reason: HOSPADM

## 2022-07-06 RX ADMIN — ENOXAPARIN SODIUM 40 MG: 40 INJECTION SUBCUTANEOUS at 08:10

## 2022-07-06 RX ADMIN — TRAMADOL HYDROCHLORIDE 50 MG: 50 TABLET, COATED ORAL at 20:50

## 2022-07-06 RX ADMIN — INSULIN ASPART 5 UNITS: 100 INJECTION, SOLUTION INTRAVENOUS; SUBCUTANEOUS at 17:21

## 2022-07-06 RX ADMIN — SUCRALFATE 1 G: 1 TABLET ORAL at 11:42

## 2022-07-06 RX ADMIN — METOCLOPRAMIDE 10 MG: 10 TABLET ORAL at 11:42

## 2022-07-06 RX ADMIN — ONDANSETRON 4 MG: 2 INJECTION INTRAMUSCULAR; INTRAVENOUS at 20:50

## 2022-07-06 RX ADMIN — PANTOPRAZOLE SODIUM 40 MG: 40 TABLET, DELAYED RELEASE ORAL at 07:54

## 2022-07-06 RX ADMIN — GABAPENTIN 100 MG: 100 CAPSULE ORAL at 07:55

## 2022-07-06 RX ADMIN — ONDANSETRON 4 MG: 2 INJECTION INTRAMUSCULAR; INTRAVENOUS at 07:52

## 2022-07-06 RX ADMIN — Medication 10 ML: at 20:50

## 2022-07-06 RX ADMIN — TRAMADOL HYDROCHLORIDE 50 MG: 50 TABLET, COATED ORAL at 07:54

## 2022-07-06 RX ADMIN — VENLAFAXINE 75 MG: 75 TABLET ORAL at 17:22

## 2022-07-06 RX ADMIN — INSULIN DETEMIR 25 UNITS: 100 INJECTION, SOLUTION SUBCUTANEOUS at 20:50

## 2022-07-06 RX ADMIN — RANOLAZINE 500 MG: 500 TABLET, FILM COATED, EXTENDED RELEASE ORAL at 20:50

## 2022-07-06 RX ADMIN — Medication 10 ML: at 08:04

## 2022-07-06 RX ADMIN — AMLODIPINE BESYLATE 5 MG: 5 TABLET ORAL at 07:54

## 2022-07-06 RX ADMIN — ATORVASTATIN CALCIUM 80 MG: 40 TABLET, FILM COATED ORAL at 07:55

## 2022-07-06 RX ADMIN — INSULIN ASPART 8 UNITS: 100 INJECTION, SOLUTION INTRAVENOUS; SUBCUTANEOUS at 11:42

## 2022-07-06 RX ADMIN — FOLIC ACID 1000 MCG: 1 TABLET ORAL at 07:55

## 2022-07-06 RX ADMIN — ISOSORBIDE MONONITRATE 30 MG: 30 TABLET, EXTENDED RELEASE ORAL at 07:53

## 2022-07-06 RX ADMIN — SUCRALFATE 1 G: 1 TABLET ORAL at 17:22

## 2022-07-06 RX ADMIN — ONDANSETRON 4 MG: 2 INJECTION INTRAMUSCULAR; INTRAVENOUS at 13:57

## 2022-07-06 RX ADMIN — CLOPIDOGREL BISULFATE 75 MG: 75 TABLET ORAL at 07:55

## 2022-07-06 RX ADMIN — INSULIN ASPART 5 UNITS: 100 INJECTION, SOLUTION INTRAVENOUS; SUBCUTANEOUS at 07:55

## 2022-07-06 RX ADMIN — SUCRALFATE 1 G: 1 TABLET ORAL at 20:50

## 2022-07-06 RX ADMIN — FUROSEMIDE 40 MG: 40 TABLET ORAL at 17:22

## 2022-07-06 RX ADMIN — METOPROLOL SUCCINATE 50 MG: 50 TABLET, EXTENDED RELEASE ORAL at 07:54

## 2022-07-06 RX ADMIN — FUROSEMIDE 40 MG: 10 INJECTION, SOLUTION INTRAMUSCULAR; INTRAVENOUS at 07:52

## 2022-07-06 RX ADMIN — GABAPENTIN 100 MG: 100 CAPSULE ORAL at 20:50

## 2022-07-06 RX ADMIN — RANOLAZINE 500 MG: 500 TABLET, FILM COATED, EXTENDED RELEASE ORAL at 07:53

## 2022-07-06 RX ADMIN — ASPIRIN 325 MG: 325 TABLET, COATED ORAL at 07:53

## 2022-07-06 RX ADMIN — SUCRALFATE 1 G: 1 TABLET ORAL at 07:55

## 2022-07-06 NOTE — PLAN OF CARE
Goal Outcome Evaluation:  Plan of Care Reviewed With: patient           Outcome Evaluation: pt sup<>sit with independence. pt sit<>stand with CGA, pt ambulated 40` with RW & CGA. pt would benefit from home with assistance & HHPT

## 2022-07-06 NOTE — PROGRESS NOTES
[facility] Medicine Services  INPATIENT PROGRESS NOTE    Length of Stay: 0  Date of Admission: 7/4/2022  Primary Care Physician: Marty, BEBE Luu    Subjective     Patient seen and evaluated today, reports still feeling nauseous and no appetite to eat. Patient denies vomiting, diarrhea, or abdominal pain.  KUB obtained late evening yesterday was unremarkable.  Questioned patient if she was feeling depressed she denies feeling depressed, but admits to taking depression medications at home.    Objective    Temp:  [96.7 °F (35.9 °C)-98.2 °F (36.8 °C)] 98 °F (36.7 °C)  Heart Rate:  [60-70] 63  Resp:  [16-18] 18  BP: (128-164)/(65-72) 145/68    Physical Exam  Constitutional:       General: She is not in acute distress.     Appearance: Normal appearance.      Comments: Morbidly obese   HENT:      Head: Normocephalic and atraumatic.      Nose: Nose normal.      Mouth/Throat:      Mouth: Mucous membranes are moist.   Eyes:      Extraocular Movements: Extraocular movements intact.      Pupils: Pupils are equal, round, and reactive to light.   Cardiovascular:      Rate and Rhythm: Normal rate and regular rhythm.      Pulses: Normal pulses.   Pulmonary:      Effort: Pulmonary effort is normal.      Breath sounds: Normal breath sounds.   Abdominal:      Palpations: Abdomen is soft.      Comments: Obesity, difficult to auscultate bowel sounds.  No guarding or rebound.   Musculoskeletal:         General: Normal range of motion.      Cervical back: Normal range of motion and neck supple.      Comments: Trace  Left BKA   Skin:     General: Skin is warm and dry.   Neurological:      General: No focal deficit present.      Mental Status: She is alert and oriented to person, place, and time.   Psychiatric:         Mood and Affect: Mood normal.             Results Review:  I have reviewed the labs, radiology results, and diagnostic studies.    Laboratory Data:   Results from  last 7 days   Lab Units 07/06/22  0533 07/05/22  0218 07/04/22  0152   SODIUM mmol/L 139  --  143   POTASSIUM mmol/L 4.1 4.3 3.1*   CHLORIDE mmol/L 101  --  106   CO2 mmol/L 29.0  --  26.0   BUN mg/dL 7*  --  6*   CREATININE mg/dL 0.80  --  0.80   GLUCOSE mg/dL 252*  --  137*   CALCIUM mg/dL 9.5  --  8.7   BILIRUBIN mg/dL  --   --  0.4   ALK PHOS U/L  --   --  98   ALT (SGPT) U/L  --   --  12   AST (SGOT) U/L  --   --  12   ANION GAP mmol/L 9.0  --  11.0     Estimated Creatinine Clearance: 103.3 mL/min (by C-G formula based on SCr of 0.8 mg/dL).          Results from last 7 days   Lab Units 07/06/22 0533 07/04/22 0152   WBC 10*3/mm3 9.05 12.50*   HEMOGLOBIN g/dL 12.0 11.7*   HEMATOCRIT % 36.0 34.1   PLATELETS 10*3/mm3 441 427           Culture Data:   No results found for: BLOODCX  No results found for: URINECX  No results found for: RESPCX  No results found for: WOUNDCX  No results found for: STOOLCX  No components found for: BODYFLD    Radiology Data:   Imaging Results (Last 24 Hours)     Procedure Component Value Units Date/Time    XR Abdomen KUB [[id number]] Collected: 07/05/22 1518     Updated: 07/05/22 1657    Narrative:      Exam:  KUB    History: Abdominal pain. Chest pain.    Supine film of the abdomen was obtained at 3:13 PM.    Comparison: August 8, 2017    No mechanical bowel obstruction.  No organomegaly.  No abnormal calcifications.  No acute osseous abnormality.  Degenerative changes and degenerative disc disease lumbar spine.  Implanted stimulator device remains in place in the right lower  quadrant with electrode overlying the left sacral ala.  Cholecystectomy.  Coronary artery bypass.  Eventration/elevation right hemidiaphragm.      Impression:      Conclusion:  No acute process.  Postoperative changes and nonacute findings as above.    [id number]    Electronically signed by:  David Gamboa MD  7/5/2022 4:55 PM CDT  Workstation: [id number]          I have reviewed the patient's current medications.      Assessment/Plan     Atypical chest pain  Nausea  History of esophageal ulcer  History of gastritis  History of gastroparesis  Morbid obese      Plan:  Patient suspected of having depressive symptoms with loss of appetite, chronic nausea, and chronic pain.  - Encourage patient to eat if she was wishing to be discharged.  -On medication review, patient takes Effexor at home, this has been restarted  - Lasix IV switched to p.o. 40 mg daily  - PT/OT eval today encourage patient to be out of bed and ambulate the hallway.  - Continue medications for management of comorbid condition.  -Avoid IV pain medication.  - Wean off oxygen  - Patient will possibly discharge if she remains pretty stable overnight.        Continue DVT and GI prophylaxis.  Patient is a full code.          Suzanne Valerio MD

## 2022-07-06 NOTE — THERAPY TREATMENT NOTE
Acute Care - Physical Therapy Treatment Note  [facility]     Patient Name: Ariana Martinez  : 1960  MRN: [id number]  Today's Date: 2022      Visit Dx:     ICD-10-CM ICD-9-CM   1. Chest pain, unspecified type  R07.9 786.50   2. Impaired physical mobility  Z74.09 781.99     Patient Active Problem List   Diagnosis   • 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, with long-term current use of insulin   • Closed nondisplaced fracture of fifth left metatarsal bone   • MAURICIO (generalized anxiety disorder)   • Depression, major, recurrent, moderate ([facility])   • GERD without esophagitis   • Long term prescription opiate use   • Mixed hyperlipidemia   • Vitamin D deficiency   • Seasonal allergic rhinitis   • Restrictive lung disease secondary to obesity   • Adult body mass index 37.0-37.9   • Snoring   • Class 3 severe obesity due to excess calories without serious comorbidity with body mass index (BMI) of 40.0 to 44.9 in adult ([facility])   • (HFpEF) heart failure with preserved ejection fraction ([facility])   • Type 2 diabetes mellitus with hyperglycemia, with long-term current use of insulin ([facility])   • Cyanocobalamin deficiency   • Coronary artery disease of native artery of native heart with stable angina pectoris ([facility])   • Hypertension   • Meniere's disease   • Gastroparesis   • Pulmonary hypertension ([facility])   • Pes cavus   • Primary osteoarthritis involving multiple joints   • Generalized anxiety disorder   • Chronic right-sided low back pain with right-sided sciatica   • Chest pain   • Adverse effect of iron   • Chest pain due to myocardial ischemia   • Nonrheumatic tricuspid valve regurgitation   • Iron deficiency anemia due to chronic blood loss   • Malaise and fatigue   • Ankle arthritis   • Urinary retention   • Endocarditis   • Essential hypertension   • Occlusion and stenosis of bilateral carotid arteries   • S/P BKA (below knee amputation) unilateral, left ([facility])   • Phantom pain after  amputation of lower extremity ([facility])   • S/P CABG (coronary artery bypass graft)   • Severe malnutrition ([facility])   • TIA (transient ischemic attack)   • Coronary artery abnormality   • Elevated d-dimer   • Neuropathy   • Chest pain, unspecified type     Past Medical History:   Diagnosis Date   • Acute bacterial endocarditis 3/13/2021   • Angina, class IV ([facility])    • Anxiety    • Anxiety and depression    • Arthritis    • Benign paroxysmal positional vertigo    • Bladder disorder     has bladder stimulator   • Carpal tunnel syndrome    • CHF (congestive heart failure) ([facility])    • Chronic pain    • Coronary atherosclerosis     hx CABG 2005.  is followed by Dr Kwon   • Depression    • Diabetes mellitus ([facility])     Type 2, controlled   • Diabetic polyneuropathy ([facility])    • Disease of thyroid gland    • Elevated cholesterol    • Female stress incontinence    • Foot pain, left    • Full dentures    • Gastroparesis    • GERD (gastroesophageal reflux disease)    • Hyperlipidemia    • Hypertension    • Low back pain    • Malaise and fatigue    • Multiple joint pain    • Obesity     Refuses to be weighed   • Occlusion and stenosis of bilateral carotid arteries 6/18/2021   • Otalgia     Both   • Perforation of tympanic membrane     Left   • Postoperative wound infection    • Vitamin D deficiency    • Wears glasses     reading     Past Surgical History:   Procedure Laterality Date   • ABDOMINAL SURGERY     • AMPUTATION FOOT     • ANGIOPLASTY      coronary   • ANKLE ARTHROSCOPY Left 2/26/2021    Procedure: Left foot hardware removal, ankle arthroscopy, bone grafting , left foot exostectomy;  Surgeon: Ignacio Lord DPM;  Location: [facility] OR;  Service: Podiatry;  Laterality: Left;   • BREAST BIOPSY Right    • CARDIAC CATHETERIZATION     • CARDIAC CATHETERIZATION N/A 6/20/2017    Procedure: Right Heart Cath;  Surgeon: Can Kwon MD PhD;  Location: [facility] CATH INVASIVE LOCATION;  Service:    • CARDIAC CATHETERIZATION  N/A 2/18/2020    Procedure: Left Heart Cath;  Surgeon: Catalina Cooper MD;  Location: [facility] CATH INVASIVE LOCATION;  Service: Cardiology;  Laterality: N/A;   • CARDIAC CATHETERIZATION N/A 4/6/2022    Procedure: Left Heart Cath;  Surgeon: Sheryl Navas MD;  Location: [facility] CATH INVASIVE LOCATION;  Service: Cardiology;  Laterality: N/A;   • CARPAL TUNNEL RELEASE     • CHOLECYSTECTOMY     • COLONOSCOPY N/A 6/24/2020    Procedure: COLONOSCOPY;  Surgeon: Julián Maldonado MD;  Location: [facility] ENDOSCOPY;  Service: Gastroenterology;  Laterality: N/A;   • CORONARY ARTERY BYPASS GRAFT  2005   • ENDOSCOPY N/A 10/19/2018    Procedure: ESOPHAGOGASTRODUODENOSCOPY possible dilation;  Surgeon: Julián Maldonado MD;  Location: [facility] ENDOSCOPY;  Service: Gastroenterology   • ENDOSCOPY N/A 6/24/2020    Procedure: ESOPHAGOGASTRODUODENOSCOPY WED appt please;  Surgeon: Julián Maldonado MD;  Location: [facility] ENDOSCOPY;  Service: Gastroenterology;  Laterality: N/A;   • ENDOSCOPY N/A 6/10/2022    Procedure: ESOPHAGOGASTRODUODENOSCOPY   room 380;  Surgeon: Jeremiah Wilkins MD;  Location: [facility] ENDOSCOPY;  Service: Gastroenterology;  Laterality: N/A;   • ENDOSCOPY AND COLONOSCOPY     • FOOT SURGERY      Toes   • FOOT SURGERY     • GASTRIC BANDING      Revision, laparoscopic   • HYSTERECTOMY     • INCISION AND DRAINAGE LEG Left 3/12/2021    Procedure: Left ankle arthroscopic irrigation and debridement, screw removal;  Surgeon: Ignacio Lord DPM;  Location: [facility];  Service: Podiatry;  Laterality: Left;   • MOUTH SURGERY     • SALPINGO OOPHORECTOMY     • SHOULDER SURGERY     • SUBTALAR ARTHRODESIS Left 1/16/2019    Procedure: LEFT FOOT HARDWARE REMOVAL, FIFTH METATARSAL , OPEN REDUCTION INTERNAL FIXATION, CALCANEAL OSTEOTOMY;  Surgeon: Ignacio Lodr DPM;  Location: [facility] OR;  Service: Podiatry   • SUBTALAR ARTHRODESIS Left 10/16/2019    Procedure: foot hardware removal, subtalar joint fusion  possible de/reattachment  of achilles tendon        (c-arm);  Surgeon: Ignacio Lord DPM;  Location: [facility] OR;  Service: Podiatry   • SUBTALAR ARTHRODESIS Left 9/30/2020    Procedure: subtalar, talonavicular joint arthrodesis.  Removal hardware.          (c-arm);  Surgeon: Ignacio Lord DPM;  Location: [facility];  Service: Podiatry;  Laterality: Left;   • TRANSESOPHAGEAL ECHOCARDIOGRAM (LAMONTE)      With color flow     PT Assessment (last 12 hours)     PT Evaluation and Treatment     Row Name 07/06/22 1245          Physical Therapy Time and Intention    Subjective Information complains of;pain  -TA     Document Type therapy note (daily note)  -TA     Mode of Treatment individual therapy;physical therapy  -TA     Comment pt independent to zoë/doff prothesis  -TA     Row Name 07/06/22 1245          General Information    Patient Profile Reviewed yes  -TA     Existing Precautions/Restrictions fall  -TA     Row Name 07/06/22 1245          Pain    Pretreatment Pain Rating 7/10  -TA     Row Name 07/06/22 1245          Cognition    Orientation Status (Cognition) oriented x 4  -TA     Personal Safety Interventions fall prevention program maintained;gait belt;muscle strengthening facilitated;nonskid shoes/slippers when out of bed;supervised activity  -TA     Row Name 07/06/22 1245          Range of Motion Comprehensive    General Range of Motion bilateral lower extremity ROM WFL;bilateral upper extremity ROM WFL  -TA     Row Name 07/06/22 1245          Strength Comprehensive (MMT)    General Manual Muscle Testing (MMT) Assessment other (see comments)  -TA     Row Name 07/06/22 1245          Bed Mobility    Bed Mobility scooting/bridging;supine-sit;sit-supine  -TA     Scooting/Bridging [place] (Bed Mobility) modified independence  -TA     Supine-Sit [place] (Bed Mobility) modified independence  -TA     Sit-Supine [place] (Bed Mobility) modified independence  -TA     Assistive Device (Bed Mobility) bed rails;head of bed  elevated  -TA     Row Name 07/06/22 1245          Transfers    Transfers sit-stand transfer;stand-sit transfer  -TA     Sit-Stand [place] (Transfers) contact guard;1 person assist  -TA     Stand-Sit [place] (Transfers) contact guard  -TA     Row Name 07/06/22 1245          Sit-Stand Transfer    Assistive Device (Sit-Stand Transfers) walker, front-wheeled  -TA     Row Name 07/06/22 1245          Stand-Sit Transfer    Assistive Device (Stand-Sit Transfers) walker, front-wheeled  -TA     Row Name 07/06/22 1245          Gait/Stairs (Locomotion)    [place] Level (Gait) contact guard  -TA     Assistive Device (Gait) walker, front-wheeled  -TA     Distance in Feet (Gait) 40`  -TA     Row Name 07/06/22 1245          Hip (Therapeutic Exercise)    Hip (Therapeutic Exercise) AROM (active range of motion)  -TA     Hip AROM (Therapeutic Exercise) bilateral;flexion;aBduction;aDduction;sitting;20 repititions  -TA     Row Name 07/06/22 1245          Knee (Therapeutic Exercise)    Knee (Therapeutic Exercise) AROM (active range of motion)  -TA     Knee AROM (Therapeutic Exercise) bilateral;LAQ (long arc quad);sitting;20 repititions  -TA     Row Name 07/06/22 1245          Ankle (Therapeutic Exercise)    Ankle (Therapeutic Exercise) AROM (active range of motion)  -TA     Ankle AROM (Therapeutic Exercise) right;bilateral;dorsiflexion;sitting;20 repititions  -TA     Row Name 07/06/22 1245          Plan of Care Review    Plan of Care Reviewed With patient  -TA     Outcome Evaluation pt sup<>sit with independence. pt sit<>stand with CGA, pt ambulated 40` with RW & CGA. pt would benefit from home with assistance & HHPT  -TA     Row Name 07/06/22 1245          Vital Signs    Pre Systolic BP Rehab 156  -TA     Pre Treatment Diastolic BP 68  -TA     Post Systolic BP Rehab 154  -TA     Post Treatment Diastolic BP 72  -TA     Pretreatment Heart Rate (beats/min) 63  -TA     Intratreatment Heart Rate (beats/min) 76  -TA      Posttreatment Heart Rate (beats/min) 69  -TA     Pre SpO2 (%) 94  -TA     O2 Delivery Pre Treatment supplemental O2  -TA     Intra SpO2 (%) 99  -TA     O2 Delivery Intra Treatment supplemental O2  -TA     Post SpO2 (%) 95  -TA     O2 Delivery Post Treatment supplemental O2  -TA     Pre Patient Position Supine  -TA     Row Name 07/06/22 1245          Positioning and Restraints    Pre-Treatment Position in bed  -TA     Post Treatment Position bed  -TA     In Bed supine;call light within reach  -TA     Row Name 07/06/22 1245          Therapy Assessment/Plan (PT)    Rehab Potential (PT) good, to achieve stated therapy goals  -TA     Criteria for Skilled Interventions Met (PT) yes;meets criteria;skilled treatment is necessary  -TA     Therapy Frequency (PT) other (see comments)  5-7 d/wk  -TA     Comment, Therapy Assessment/Plan (PT) continue  -TA     Row Name 07/06/22 124          Bed Mobility Goal 1 (PT)    Activity/Assistive Device (Bed Mobility Goal 1, PT) bridging;rolling to left;rolling to right;scooting;sit to supine;supine to sit  -TA     [place] Level/Cues Needed (Bed Mobility Goal 1, PT) independent  -TA     Time Frame (Bed Mobility Goal 1, PT) by discharge  -TA     Progress/Outcomes (Bed Mobility Goal 1, PT) goal not met  -TA     Row Name 07/06/22 1245          Transfer Goal 1 (PT)    Activity/Assistive Device (Transfer Goal 1, PT) sit-to-stand/stand-to-sit;bed-to-chair/chair-to-bed;toilet;walker, rolling  -TA     [place] Level/Cues Needed (Transfer Goal 1, PT) modified independence  -TA     Time Frame (Transfer Goal 1, PT) by discharge  -TA     Progress/Outcome (Transfer Goal 1, PT) goal not met  -TA     Row Name 07/06/22 1245          Gait Training Goal 1 (PT)    Activity/Assistive Device (Gait Training Goal 1, PT) gait (walking locomotion);assistive device use;walker, rolling  -TA     [place] Level (Gait Training Goal 1, PT) modified independence  -TA     Distance (Gait Training Goal 1, PT)  10'x2  -TA     Time Frame (Gait Training Goal 1, PT) by discharge  -TA     Progress/Outcome (Gait Training Goal 1, PT) goal not met  -TA           User Key  (r) = Recorded By, (t) = Taken By, (c) = Cosigned By    Initials Name Provider Type    Rosalia Ambrocio PTA Physical Therapist Assistant                Physical Therapy Education                 Title: PT OT SLP Therapies (In Progress)     Topic: Physical Therapy (Not Started)     Point: Mobility training (Not Started)     Learner Progress:  Not documented in this visit.          Point: Home exercise program (Not Started)     Learner Progress:  Not documented in this visit.          Point: Body mechanics (Not Started)     Learner Progress:  Not documented in this visit.          Point: Precautions (Not Started)     Learner Progress:  Not documented in this visit.                          PT Recommendation and Plan  Anticipated Discharge Disposition (PT): home with assist, home with home health  Therapy Frequency (PT): other (see comments) (5-7 d/wk)  Plan of Care Reviewed With: patient  Outcome Evaluation: pt sup<>sit with independence. pt sit<>stand with CGA, pt ambulated 40` with RW & CGA. pt would benefit from home with assistance & HHPT   Outcome Measures     Row Name 07/06/22 1400             How much help from another person do you currently need...    Turning from your back to your side while in flat bed without using bedrails? 4  -TA      Moving from lying on back to sitting on the side of a flat bed without bedrails? 4  -TA      Moving to and from a bed to a chair (including a wheelchair)? 3  -TA      Standing up from a chair using your arms (e.g., wheelchair, bedside chair)? 3  -TA      Climbing 3-5 steps with a railing? 2  -TA      To walk in hospital room? 3  -TA      AM-PAC 6 Clicks Score (PT) 19  -TA              Functional Assessment    Outcome Measure Options AM-PAC 6 Clicks Basic Mobility (PT)  -TA            User Key  (r) = Recorded By, (t)  = Taken By, (c) = Cosigned By    Initials Name Provider Type    Rosalia Ambrocio PTA Physical Therapist Assistant                 Time Calculation:    PT Charges     Row Name 07/06/22 1455             Time Calculation    Start Time 1245  -TA      Stop Time 1314  -TA      Time Calculation (min) 29 min  -TA      PT Received On 07/06/22  -TA              Time Calculation- PT    Total Timed Code Minutes- PT 29 minute(s)  -TA              Timed Charges    37442 - PT Therapeutic Exercise Minutes 15  -TA      32052 - Gait Training Minutes  14  -TA              Total Minutes    Timed Charges Total Minutes 29  -TA       Total Minutes 29  -TA            User Key  (r) = Recorded By, (t) = Taken By, (c) = Cosigned By    Initials Name Provider Type    Rosalia Ambrocio PTA Physical Therapist Assistant              Therapy Charges for Today     Code Description Service Date Service Provider Modifiers Qty    19139584935 HC PT THER PROC EA 15 MIN 7/6/2022 Rosalia King PTA GP 1    [id number] HC GAIT TRAINING EA 15 MIN 7/6/2022 Rosalia King PTA GP 1          PT G-Codes  Outcome Measure Options: AM-PAC 6 Clicks Basic Mobility (PT)  AM-PAC 6 Clicks Score (PT): 19    Rosalia King PTA  7/6/2022

## 2022-07-06 NOTE — PLAN OF CARE
Goal Outcome Evaluation:  Plan of Care Reviewed With: patient        Progress: improving  Outcome Evaluation: Patient has denied chest pain this shift. She does complain of discomfort to upper abdomen and was medicated with PRN pain med as ordered. VSS.

## 2022-07-07 ENCOUNTER — READMISSION MANAGEMENT (OUTPATIENT)
Dept: CALL CENTER | Facility: HOSPITAL | Age: 62
End: 2022-07-07

## 2022-07-07 ENCOUNTER — HOME CARE VISIT (OUTPATIENT)
Dept: HOME HEALTH SERVICES | Facility: HOME HEALTHCARE | Age: 62
End: 2022-07-07

## 2022-07-07 VITALS
TEMPERATURE: 97.9 F | DIASTOLIC BLOOD PRESSURE: 61 MMHG | OXYGEN SATURATION: 91 % | SYSTOLIC BLOOD PRESSURE: 118 MMHG | HEIGHT: 68 IN | WEIGHT: 256.7 LBS | RESPIRATION RATE: 18 BRPM | BODY MASS INDEX: 38.91 KG/M2 | HEART RATE: 67 BPM

## 2022-07-07 LAB
GLUCOSE BLDC GLUCOMTR-MCNC: 284 MG/DL (ref 70–130)
GLUCOSE BLDC GLUCOMTR-MCNC: 290 MG/DL (ref 70–130)

## 2022-07-07 PROCEDURE — 82962 GLUCOSE BLOOD TEST: CPT

## 2022-07-07 PROCEDURE — 25010000002 ONDANSETRON PER 1 MG: Performed by: NURSE PRACTITIONER

## 2022-07-07 PROCEDURE — 96372 THER/PROPH/DIAG INJ SC/IM: CPT

## 2022-07-07 PROCEDURE — G0378 HOSPITAL OBSERVATION PER HR: HCPCS

## 2022-07-07 PROCEDURE — 25010000002 ENOXAPARIN PER 10 MG: Performed by: INTERNAL MEDICINE

## 2022-07-07 PROCEDURE — 96376 TX/PRO/DX INJ SAME DRUG ADON: CPT

## 2022-07-07 PROCEDURE — 63710000001 INSULIN ASPART PER 5 UNITS: Performed by: PHYSICIAN ASSISTANT

## 2022-07-07 RX ORDER — HYDROCODONE BITARTRATE AND ACETAMINOPHEN 7.5; 325 MG/1; MG/1
1 TABLET ORAL EVERY 6 HOURS PRN
Qty: 15 TABLET | Refills: 0 | Status: SHIPPED | OUTPATIENT
Start: 2022-07-07 | End: 2022-07-07 | Stop reason: SDUPTHER

## 2022-07-07 RX ORDER — TRAMADOL HYDROCHLORIDE 50 MG/1
50 TABLET ORAL EVERY 8 HOURS PRN
Qty: 21 TABLET | Refills: 0 | Status: SHIPPED | OUTPATIENT
Start: 2022-07-07 | End: 2022-07-12

## 2022-07-07 RX ORDER — SUCRALFATE 1 G/1
1 TABLET ORAL
Qty: 120 TABLET | Refills: 0 | Status: SHIPPED | OUTPATIENT
Start: 2022-07-07 | End: 2022-09-23

## 2022-07-07 RX ORDER — HYDROCODONE BITARTRATE AND ACETAMINOPHEN 7.5; 325 MG/1; MG/1
1 TABLET ORAL EVERY 8 HOURS PRN
Qty: 12 TABLET | Refills: 0 | Status: SHIPPED | OUTPATIENT
Start: 2022-07-07 | End: 2022-07-19 | Stop reason: SDUPTHER

## 2022-07-07 RX ORDER — FUROSEMIDE 40 MG/1
40 TABLET ORAL DAILY
Qty: 30 TABLET | Refills: 0 | Status: SHIPPED | OUTPATIENT
Start: 2022-07-08 | End: 2022-09-07 | Stop reason: SDUPTHER

## 2022-07-07 RX ORDER — DEXTROMETHORPHAN HYDROBROMIDE AND PROMETHAZINE HYDROCHLORIDE 15; 6.25 MG/5ML; MG/5ML
5 SYRUP ORAL 4 TIMES DAILY PRN
Qty: 500 ML | Refills: 0 | Status: SHIPPED | OUTPATIENT
Start: 2022-07-07 | End: 2022-07-22

## 2022-07-07 RX ADMIN — PANTOPRAZOLE SODIUM 40 MG: 40 TABLET, DELAYED RELEASE ORAL at 09:12

## 2022-07-07 RX ADMIN — SUCRALFATE 1 G: 1 TABLET ORAL at 09:13

## 2022-07-07 RX ADMIN — METOPROLOL SUCCINATE 50 MG: 50 TABLET, EXTENDED RELEASE ORAL at 09:13

## 2022-07-07 RX ADMIN — ACETAMINOPHEN 650 MG: 325 TABLET, FILM COATED ORAL at 09:25

## 2022-07-07 RX ADMIN — ONDANSETRON 4 MG: 2 INJECTION INTRAMUSCULAR; INTRAVENOUS at 04:33

## 2022-07-07 RX ADMIN — INSULIN ASPART 8 UNITS: 100 INJECTION, SOLUTION INTRAVENOUS; SUBCUTANEOUS at 11:04

## 2022-07-07 RX ADMIN — ENOXAPARIN SODIUM 40 MG: 40 INJECTION SUBCUTANEOUS at 09:13

## 2022-07-07 RX ADMIN — ONDANSETRON 4 MG: 2 INJECTION INTRAMUSCULAR; INTRAVENOUS at 11:03

## 2022-07-07 RX ADMIN — ASPIRIN 325 MG: 325 TABLET, COATED ORAL at 09:12

## 2022-07-07 RX ADMIN — AMLODIPINE BESYLATE 5 MG: 5 TABLET ORAL at 09:13

## 2022-07-07 RX ADMIN — ISOSORBIDE MONONITRATE 30 MG: 30 TABLET, EXTENDED RELEASE ORAL at 09:12

## 2022-07-07 RX ADMIN — SUCRALFATE 1 G: 1 TABLET ORAL at 11:11

## 2022-07-07 RX ADMIN — GABAPENTIN 100 MG: 100 CAPSULE ORAL at 09:13

## 2022-07-07 RX ADMIN — FUROSEMIDE 40 MG: 40 TABLET ORAL at 09:13

## 2022-07-07 RX ADMIN — RANOLAZINE 500 MG: 500 TABLET, FILM COATED, EXTENDED RELEASE ORAL at 09:11

## 2022-07-07 RX ADMIN — CLOPIDOGREL BISULFATE 75 MG: 75 TABLET ORAL at 09:13

## 2022-07-07 RX ADMIN — VENLAFAXINE 75 MG: 75 TABLET ORAL at 09:11

## 2022-07-07 RX ADMIN — ATORVASTATIN CALCIUM 80 MG: 40 TABLET, FILM COATED ORAL at 09:18

## 2022-07-07 RX ADMIN — INSULIN ASPART 8 UNITS: 100 INJECTION, SOLUTION INTRAVENOUS; SUBCUTANEOUS at 09:13

## 2022-07-07 RX ADMIN — FOLIC ACID 1000 MCG: 1 TABLET ORAL at 09:13

## 2022-07-07 RX ADMIN — Medication 10 ML: at 09:17

## 2022-07-07 NOTE — DISCHARGE SUMMARY
[facility] Medicine Services  DISCHARGE SUMMARY       Date of Admission: 7/4/2022  Date of Discharge:  7/7/2022  Primary Care Physician: Kimberly Ferguson APRN    Presenting Problem/History of Present Illness:  Chest pain [R07.9]  Chest pain, unspecified type [R07.9]       Final Discharge Diagnoses:  Active Hospital Problems    Diagnosis    • Chest pain, unspecified type    • Chest pain    Atypical chest pain  Chronic nausea  Peptic ulcer disease  Diabetes mellitus insulin-dependent.      Consults:   Consults     Date and Time Order Name Status Description    7/4/2022 10:34 AM Inpatient Cardiology Consult Completed     6/8/2022 12:54 PM Inpatient Gastroenterology Consult Completed     6/7/2022  2:18 PM Inpatient Cardiology Consult Completed               Pertinent Test Results:   Lab Results (most recent)     Procedure Component Value Units Date/Time    POC Glucose Once [[id number]]  (Abnormal) Collected: 07/07/22 1016    Specimen: Blood Updated: 07/07/22 1032     Glucose 290 mg/dL      Comment: RN NotifiedOperator: [id number] JESIKA BRADFORDAndrew ID: [id number]       POC Glucose Once [[id number]]  (Abnormal) Collected: 07/07/22 0612    Specimen: Blood Updated: 07/07/22 0658     Glucose 284 mg/dL      Comment: RN NotifiedOperator: [id number] [facility] ID: [id number]       Basic Metabolic Panel [[id number]]  (Abnormal) Collected: 07/06/22 0533    Specimen: Blood Updated: 07/06/22 0643     Glucose 252 mg/dL      BUN 7 mg/dL      Creatinine 0.80 mg/dL      Sodium 139 mmol/L      Potassium 4.1 mmol/L      Chloride 101 mmol/L      CO2 29.0 mmol/L      Calcium 9.5 mg/dL      BUN/Creatinine Ratio 8.8     Anion Gap 9.0 mmol/L      eGFR 84.5 mL/min/1.73      Comment: National Kidney Foundation and American Society of Nephrology (ASN) Task Force recommended calculation based on the Chronic Kidney Disease Epidemiology Collaboration (CKD-EPI) equation refit  without adjustment for race.       Narrative:      GFR Normal >60  Chronic Kidney Disease <60  Kidney Failure <15      CBC & Differential [[id number]]  (Abnormal) Collected: 07/06/22 0533    Specimen: Blood Updated: 07/06/22 0617    Narrative:      The following orders were created for panel order CBC & Differential.  Procedure                               Abnormality         Status                     ---------                               -----------         ------                     CBC Auto Differential[[id number]]        Abnormal            Final result                 Please view results for these tests on the individual orders.    CBC Auto Differential [[id number]]  (Abnormal) Collected: 07/06/22 0533    Specimen: Blood Updated: 07/06/22 0617     WBC 9.05 10*3/mm3      RBC 4.13 10*6/mm3      Hemoglobin 12.0 g/dL      Hematocrit 36.0 %      MCV 87.2 fL      MCH 29.1 pg      MCHC 33.3 g/dL      RDW 13.2 %      RDW-SD 41.7 fl      MPV 9.7 fL      Platelets 441 10*3/mm3      Neutrophil % 63.0 %      Lymphocyte % 24.5 %      Monocyte % 7.0 %      Eosinophil % 4.9 %      Basophil % 0.4 %      Immature Grans % 0.2 %      Neutrophils, Absolute 5.70 10*3/mm3      Lymphocytes, Absolute 2.22 10*3/mm3      Monocytes, Absolute 0.63 10*3/mm3      Eosinophils, Absolute 0.44 10*3/mm3      Basophils, Absolute 0.04 10*3/mm3      Immature Grans, Absolute 0.02 10*3/mm3      nRBC 0.0 /100 WBC     Potassium [[id number]]  (Normal) Collected: 07/05/22 0218    Specimen: Blood Updated: 07/05/22 0317     Potassium 4.3 mmol/L     BNP [[id number]]  (Abnormal) Collected: 07/04/22 0717    Specimen: Blood Updated: 07/04/22 1538     proBNP 1,273.0 pg/mL     Narrative:      Among patients with dyspnea, NT-proBNP is highly sensitive for the detection of acute congestive heart failure. In addition NT-proBNP of <300 pg/ml effectively rules out acute congestive heart failure with 99% negative predictive value.    Results may be falsely decreased if  patient taking Biotin.      Extra Tubes [[id number]] Collected: 07/04/22 0717    Specimen: Blood Updated: 07/04/22 0833    Narrative:      The following orders were created for panel order Extra Tubes.  Procedure                               Abnormality         Status                     ---------                               -----------         ------                     Lavender Top[[id number]]                                     Final result               Green Top (Gel)[[id number]]                                  Final result                 Please view results for these tests on the individual orders.    Green Top (Gel) [[id number]] Collected: 07/04/22 0717    Specimen: Blood Updated: 07/04/22 0833     Extra Tube Hold for add-ons.     Comment: Auto resulted.       Lavender Top [[id number]] Collected: 07/04/22 0717    Specimen: Blood Updated: 07/04/22 0833     Extra Tube hold for add-on     Comment: Auto resulted       Troponin [[id number]]  (Normal) Collected: 07/04/22 0632    Specimen: Blood Updated: 07/04/22 0740     Troponin T <0.010 ng/mL     Narrative:      Troponin T Reference Range:  <= 0.03 ng/mL-   Negative for AMI  >0.03 ng/mL-     Abnormal for myocardial necrosis.  Clinicians would have to utilize clinical acumen, EKG, Troponin and serial changes to determine if it is an Acute Myocardial Infarction or myocardial injury due to an underlying chronic condition.       Results may be falsely decreased if patient taking Biotin.      COVID-19 and FLU A/B PCR - Swab, Nasopharynx [[id number]]  (Normal) Collected: 07/04/22 0412    Specimen: Swab from Nasopharynx Updated: 07/04/22 0442     COVID19 Not Detected     Influenza A PCR Not Detected     Influenza B PCR Not Detected    Narrative:      Fact sheet for providers: https://www.fda.gov/media/224770/download    Fact sheet for patients: https://www.fda.gov/media/828564/download    Test performed by PCR.    Troponin [[id number]]  (Normal) Collected: 07/04/22 8472     Specimen: Blood Updated: 07/04/22 0351     Troponin T <0.010 ng/mL     Narrative:      Troponin T Reference Range:  <= 0.03 ng/mL-   Negative for AMI  >0.03 ng/mL-     Abnormal for myocardial necrosis.  Clinicians would have to utilize clinical acumen, EKG, Troponin and serial changes to determine if it is an Acute Myocardial Infarction or myocardial injury due to an underlying chronic condition.       Results may be falsely decreased if patient taking Biotin.      Comprehensive Metabolic Panel [[id number]]  (Abnormal) Collected: 07/04/22 0152    Specimen: Blood Updated: 07/04/22 0214     Glucose 137 mg/dL      BUN 6 mg/dL      Creatinine 0.80 mg/dL      Sodium 143 mmol/L      Potassium 3.1 mmol/L      Chloride 106 mmol/L      CO2 26.0 mmol/L      Calcium 8.7 mg/dL      Total Protein 6.3 g/dL      Albumin 3.70 g/dL      ALT (SGPT) 12 U/L      AST (SGOT) 12 U/L      Alkaline Phosphatase 98 U/L      Total Bilirubin 0.4 mg/dL      Globulin 2.6 gm/dL      A/G Ratio 1.4 g/dL      BUN/Creatinine Ratio 7.5     Anion Gap 11.0 mmol/L      eGFR 84.5 mL/min/1.73      Comment: National Kidney Foundation and American Society of Nephrology (ASN) Task Force recommended calculation based on the Chronic Kidney Disease Epidemiology Collaboration (CKD-EPI) equation refit without adjustment for race.       Narrative:      GFR Normal >60  Chronic Kidney Disease <60  Kidney Failure <15      Lipase [[id number]]  (Abnormal) Collected: 07/04/22 0152    Specimen: Blood Updated: 07/04/22 0214     Lipase 8 U/L     CBC & Differential [[id number]]  (Abnormal) Collected: 07/04/22 0152    Specimen: Blood Updated: 07/04/22 0155    Narrative:      The following orders were created for panel order CBC & Differential.  Procedure                               Abnormality         Status                     ---------                               -----------         ------                     CBC Auto Differential[[id number]]        Abnormal            Final  result                 Please view results for these tests on the individual orders.    CBC Auto Differential [[id number]]  (Abnormal) Collected: 07/04/22 0152    Specimen: Blood Updated: 07/04/22 0155     WBC 12.50 10*3/mm3      RBC 3.91 10*6/mm3      Hemoglobin 11.7 g/dL      Hematocrit 34.1 %      MCV 87.2 fL      MCH 29.9 pg      MCHC 34.3 g/dL      RDW 13.5 %      RDW-SD 41.8 fl      MPV 9.4 fL      Platelets 427 10*3/mm3      Neutrophil % 62.8 %      Lymphocyte % 24.2 %      Monocyte % 8.4 %      Eosinophil % 3.6 %      Basophil % 0.5 %      Immature Grans % 0.5 %      Neutrophils, Absolute 7.85 10*3/mm3      Lymphocytes, Absolute 3.03 10*3/mm3      Monocytes, Absolute 1.05 10*3/mm3      Eosinophils, Absolute 0.45 10*3/mm3      Basophils, Absolute 0.06 10*3/mm3      Immature Grans, Absolute 0.06 10*3/mm3      nRBC 0.0 /100 WBC         Imaging Results (Most Recent)     Procedure Component Value Units Date/Time    XR Abdomen KUB [[id number]] Collected: 07/05/22 1518     Updated: 07/05/22 1657    Narrative:      Exam:  KUB    History: Abdominal pain. Chest pain.    Supine film of the abdomen was obtained at 3:13 PM.    Comparison: August 8, 2017    No mechanical bowel obstruction.  No organomegaly.  No abnormal calcifications.  No acute osseous abnormality.  Degenerative changes and degenerative disc disease lumbar spine.  Implanted stimulator device remains in place in the right lower  quadrant with electrode overlying the left sacral ala.  Cholecystectomy.  Coronary artery bypass.  Eventration/elevation right hemidiaphragm.      Impression:      Conclusion:  No acute process.  Postoperative changes and nonacute findings as above.    [id number]    Electronically signed by:  David Gamboa MD  7/5/2022 4:55 PM CDT  Workstation: [id number]    XR Chest 1 View [[id number]] Collected: 07/04/22 0101     Updated: 07/04/22 0151    Narrative:      XR CHEST 1 VIEW    COMPARISONS: June 13, 2022    ADDITIONAL PERTINENT HISTORY: Chest  "pain    FINDINGS:  Cardiomediastinal silhouette:  Patient status post median  sternotomy. Stable mild cardiomegaly.    Pulmonary vasculature:  Mild vascular congestion without catalino  pulmonary edema.    Lung fields:  Elevation of the right hemidiaphragm.    Pleural spaces: Negative.    Osseous structures:  Negative.    Surrounding soft tissues:  Negative.        Impression:      1. Mild cardiomegaly with mild vascular congestion without catalino  pulmonary edema.  2. Continued elevation of the right hemidiaphragm    Electronically signed by:  Miguel Kyle MD  7/4/2022 1:49 AM CDT  Workstation: [id number]          Chief Complaint on Day of Discharge: None    Hospital Course:  The patient is a 62 y.o. female who presented to [facility] with complaints of atypical chest pain.  She had recurrent chest pain admissions in the past.  She was seen by cardiology and ruled out for having cardiac related chest pain.  She was given some pain medications, started back on diuresing with Lasix, continued with medications for gastritis and peptic ulcer disease.  Patient continued to complain of chronic nausea and loss of appetite.  She was started back on her antidepressant medication.  Patient was not actively vomiting and did not have abdominal pain. Imaging studies were unremarkable.  She was encouraged to eat and go home, and follow-up with her PCP and possible psychiatry for optimizing depression treatment.  Patient voiced understanding.    Condition on Discharge: Stable    Physical Exam on Discharge:  /61 (BP Location: Right arm, Patient Position: Lying)   Pulse 67   Temp 97.9 °F (36.6 °C) (Temporal)   Resp 18   Ht 172.7 cm (68\")   Wt 116 kg (256 lb 11.2 oz) Comment: pt weighed flat with 1 pillow, 1 sheet and 1 blanket  SpO2 91%   BMI 39.03 kg/m²   Physical Exam  Constitutional:       Appearance: Normal appearance. She is obese.   HENT:      Head: Normocephalic and atraumatic.      Nose: Nose " normal.      Mouth/Throat:      Mouth: Mucous membranes are moist.   Eyes:      Extraocular Movements: Extraocular movements intact.      Pupils: Pupils are equal, round, and reactive to light.   Cardiovascular:      Rate and Rhythm: Normal rate and regular rhythm.      Pulses: Normal pulses.      Heart sounds: Normal heart sounds.   Pulmonary:      Effort: Pulmonary effort is normal.      Breath sounds: Normal breath sounds.   Abdominal:      General: Abdomen is flat.      Palpations: Abdomen is soft.   Musculoskeletal:         General: Normal range of motion.      Cervical back: Normal range of motion and neck supple.      Comments: Left BKA   Skin:     General: Skin is warm and dry.   Neurological:      General: No focal deficit present.      Mental Status: She is alert and oriented to person, place, and time.           Discharge Disposition:  Home or Self Care    Discharge Medications:     Discharge Medications      New Medications      Instructions Start Date   furosemide 40 MG tablet  Commonly known as: LASIX   40 mg, Oral, Daily   Start Date: July 8, 2022     promethazine-dextromethorphan 6.25-15 MG/5ML syrup  Commonly known as: PROMETHAZINE-DM   5 mL, Oral, 4 Times Daily PRN      sucralfate 1 g tablet  Commonly known as: CARAFATE   1 g, Oral, 4 Times Daily Before Meals & Nightly      traMADol 50 MG tablet  Commonly known as: ULTRAM   50 mg, Oral, Every 8 Hours PRN         Changes to Medications      Instructions Start Date   aspirin  MG tablet  What changed: how much to take   325 mg, Oral, Daily      HYDROcodone-acetaminophen 7.5-325 MG per tablet  Commonly known as: NORCO  What changed: when to take this   1 tablet, Oral, Every 8 Hours PRN      NovoLOG 100 UNIT/ML injection  Generic drug: insulin aspart  What changed: See the new instructions.   INJECT 8 UNITS SUBCUTANEOUSLY 3 TIMES DAILY WITH MEALS.      NovoLOG FlexPen 100 UNIT/ML solution pen-injector sc pen  Generic drug: insulin aspart  What  "changed:   · how much to take  · when to take this  · additional instructions   INJECT 0 TO 14 UNITS UNDER THE SKIN 3 TIMES A DAY BEFORE MEALS ACCORDING TO SLIDING SCALE         Continue These Medications      Instructions Start Date   Accu-Chek Guide test strip  Generic drug: glucose blood   1 each, Other, 4 Times Daily, To test blood sugar 4X daily. For  Dx E11.65      accu-chek soft touch lancets   As directed      amLODIPine 5 MG tablet  Commonly known as: NORVASC   5 mg, Oral, Daily      ARIPiprazole 5 MG tablet  Commonly known as: ABILIFY   5 mg, Oral, Daily      atorvastatin 80 MG tablet  Commonly known as: LIPITOR   TAKE 1 TABLET BY MOUTH EVERY DAY      B-D ULTRAFINE III SHORT PEN 31G X 8 MM misc  Generic drug: Insulin Pen Needle   Use up to 4 (four) times daily with insulin as directed.      B-D ULTRAFINE III SHORT PEN 31G X 8 MM misc  Generic drug: Insulin Pen Needle   USE TO INJECT 5 TIMES A DAY      BASAGLAR KWIKPEN 100 UNIT/ML injection pen   INJECT 24 UNITS SUBCUTANEOUSLY AT BEDTIME      BD [employer] misc   1 each, Does not apply, Take As Directed      Calcium Citrate-Vitamin D 250-200 MG-UNIT tablet   2 tablets, Oral, 2 Times Daily      clopidogrel 75 MG tablet  Commonly known as: PLAVIX   75 mg, Oral, Daily      cyanocobalamin 1000 MCG/ML injection   1,000 mcg, Intramuscular, Every 28 Days      Easy Touch FlipLock Needles 25G X 1-1/2\" misc  Generic drug: Needle (Disp)   1 each, Does not apply, Every 28 Days, For administering B12 cyanocobalomin. Dx vitamin B12 deficiency.      folic acid 1 MG tablet  Commonly known as: FOLVITE   TAKE 1 TABLET BY MOUTH EVERY DAY      gabapentin 100 MG capsule  Commonly known as: NEURONTIN   100 mg, Oral, Every 12 Hours Scheduled      glucose monitor monitoring kit   1 each, Does not apply, Daily, accucheck eve meter, E11.9      Hypodermic Needle 20G X 1\" misc   1 each, Does not apply, Every 28 Days, For drawing up B12 for injection monthly, change back " "to smaller gauge needle prior to injection. Dx Vitamin B12  Deficiency.      isosorbide mononitrate 30 MG 24 hr tablet  Commonly known as: IMDUR   30 mg, Oral, Every 24 Hours Scheduled      meclizine 25 MG tablet  Commonly known as: ANTIVERT   25 mg, Oral, 3 Times Daily PRN      melatonin 3 MG tablet   6 mg, Oral, Nightly, May take 1 if this works, or may take 2 as single dose if more effective.      methocarbamol 500 MG tablet  Commonly known as: ROBAXIN   TAKE 1 TABLET BY MOUTH 2 TIMES A DAY AS NEEDED FOR MUSCLE SPASMS.      metoclopramide 10 MG tablet  Commonly known as: REGLAN   10 mg, Oral, 4 Times Daily PRN      metoprolol succinate XL 50 MG 24 hr tablet  Commonly known as: TOPROL-XL   50 mg, Oral, Daily, Dose increased 5/24/21      montelukast 10 MG tablet  Commonly known as: SINGULAIR   10 mg, Oral, Nightly, To help with allergies causing runny nose      naloxone 0.4 MG/ML injection  Commonly known as: NARCAN   0.2 mg, Intravenous, Every 5 Minutes PRN      nitroglycerin 0.4 MG SL tablet  Commonly known as: NITROSTAT   0.4 mg, Sublingual, Every 5 Minutes PRN, Take no more than 3 doses in 15 minutes.      ondansetron 8 MG tablet  Commonly known as: ZOFRAN   8 mg, Oral, Every 8 Hours PRN      ONE TOUCH ULTRA MINI w/Device kit   Patient will need the Accu-Check Avitio meter      pantoprazole 20 MG EC tablet  Commonly known as: PROTONIX   40 mg, Oral, Daily      ranolazine 500 MG 12 hr tablet  Commonly known as: RANEXA   500 mg, Oral, Every 12 Hours Scheduled      Syringe 20G X 1-1/2\" 3 ML misc   1 each, Does not apply, Every 28 Days, For injection cyanocobalomin, Dx vit B12 deficiency.      venlafaxine XR 75 MG 24 hr capsule  Commonly known as: EFFEXOR-XR   75 mg, Oral, Daily With Breakfast      vitamin D 1.25 MG (67686 UT) capsule capsule  Commonly known as: ERGOCALCIFEROL   50,000 Units, Oral, Every 7 Days         Stop These Medications    azithromycin 250 MG tablet  Commonly known as: ZITHROMAX      "       Discharge Diet: Diabetic diet    Activity at Discharge: As tolerated    Discharge Care Plan/Instructions:     Follow-up Appointments:   Future Appointments   Date Time Provider Department [place]   7/21/2022  1:00 PM Kimberly Ferguson APRN [facility]   8/1/2022 10:00 AM Marcela Lawson APRN [facility]   8/29/2022 12:45 PM NURSE [facility]   8/29/2022  1:15 PM Teressa Hammond APRN [facility]   9/15/2022  9:00 AM Kimberly Ferguson APRN [facility]   10/3/2022  9:00 AM Elvis Gamboa APRN [facility]       Suzanne Valerio MD    Time: less than 30 minutes

## 2022-07-07 NOTE — OUTREACH NOTE
Prep Survey    Flowsheet Row Responses   [facility] facility patient discharged from? [place]   Is LACE score < 7 ? No   Emergency Room discharge w/ pulse ox? No   Eligibility [facility]   Discharge diagnosis Chest pain   Does the patient have one of the following disease processes/diagnoses(primary or secondary)? Other   Does the patient have Home health ordered? No   Is there a DME ordered? No   Prep survey completed? Yes          FERNANDO RAMIREZ - Registered Nurse

## 2022-07-07 NOTE — PLAN OF CARE
Goal Outcome Evaluation:  Plan of Care Reviewed With: patient        Progress: no change  Patient denies c/o chest and abdominal pain. She does continue to c/o nausea and medicated accordingly. Diet changed to clear liquid starting this a.m.

## 2022-07-08 ENCOUNTER — TRANSITIONAL CARE MANAGEMENT TELEPHONE ENCOUNTER (OUTPATIENT)
Dept: CALL CENTER | Facility: HOSPITAL | Age: 62
End: 2022-07-08

## 2022-07-08 LAB
QT INTERVAL: 446 MS
QT INTERVAL: 458 MS
QT INTERVAL: 462 MS
QTC INTERVAL: 476 MS
QTC INTERVAL: 480 MS
QTC INTERVAL: 488 MS

## 2022-07-08 NOTE — OUTREACH NOTE
Call Center TCM Note    Flowsheet Row Responses   [facility] patient discharged from? [place]   Does the patient have one of the following disease processes/diagnoses(primary or secondary)? Other   TCM attempt successful? Yes   Call start time 1420   Call end time 1427   Discharge diagnosis Chest pain, chronic nausea, peptic ulcer disease, diabetes mellitus insulin dependent   Person spoke with today (if not patient) and relationship patient   Meds reviewed with patient/caregiver? Yes   Does the patient have all medications ordered at discharge? Yes   Is the patient taking all medications as directed (includes completed medication regime)? Yes   Does the patient have a primary care provider?  Yes   Does the patient have an appointment with their PCP within 7 days of discharge? Yes   Comments regarding PCP BE[facility] PCP. Hospital follow up scheduled for Tues 7/12/22  1115am   Has the patient kept scheduled appointments due by today? N/A   What is the Home health agency?  [facility]   Has home health visited the patient within 72 hours of discharge? Call prior to 72 hours   Psychosocial issues? No   Did the patient receive a copy of their discharge instructions? Yes   Nursing interventions Reviewed instructions with patient   What is the patient's perception of their health status since discharge? Improving   Is the patient/caregiver able to teach back signs and symptoms related to disease process for when to call PCP? Yes   Is the patient/caregiver able to teach back signs and symptoms related to disease process for when to call 911? Yes   Is the patient/caregiver able to teach back the hierarchy of who to call/visit for symptoms/problems? PCP, Specialist, Home health nurse, Urgent Care, ED, 911 Yes   If the patient is a current smoker, are they able to teach back resources for cessation? Not a smoker   TCM call completed? Yes   Wrap up additional comments Denies further  questions or needs today.           Елена Lagunas RN    7/8/2022, 14:27 CDT

## 2022-07-12 ENCOUNTER — TELEPHONE (OUTPATIENT)
Dept: FAMILY MEDICINE CLINIC | Facility: CLINIC | Age: 62
End: 2022-07-12

## 2022-07-12 DIAGNOSIS — K21.9 GERD WITHOUT ESOPHAGITIS: ICD-10-CM

## 2022-07-12 RX ORDER — PANTOPRAZOLE SODIUM 20 MG/1
40 TABLET, DELAYED RELEASE ORAL DAILY
Qty: 90 TABLET | Refills: 3 | Status: SHIPPED | OUTPATIENT
Start: 2022-07-12 | End: 2023-02-20 | Stop reason: SDUPTHER

## 2022-07-12 NOTE — TELEPHONE ENCOUNTER
Incoming Refill Request      Medication requested (name and dose):   pantoprazole (PROTONIX) 20 MG EC tablet         Pharmacy where request should be sent:   [facility]     Additional details provided by patient:   2x daily was changed when she was in hospital     Best call back number:     Does the patient have less than a 3 day supply:  [] Yes  [] No    Tiffanie Vaughan  07/12/22, 10:17 CDT

## 2022-07-14 ENCOUNTER — HOME CARE VISIT (OUTPATIENT)
Dept: HOME HEALTH SERVICES | Facility: CLINIC | Age: 62
End: 2022-07-14

## 2022-07-14 ENCOUNTER — TELEPHONE (OUTPATIENT)
Dept: FAMILY MEDICINE CLINIC | Facility: CLINIC | Age: 62
End: 2022-07-14

## 2022-07-14 VITALS
TEMPERATURE: 97.4 F | HEART RATE: 81 BPM | OXYGEN SATURATION: 98 % | RESPIRATION RATE: 20 BRPM | SYSTOLIC BLOOD PRESSURE: 122 MMHG | DIASTOLIC BLOOD PRESSURE: 62 MMHG

## 2022-07-14 PROCEDURE — G0151 HHCP-SERV OF PT,EA 15 MIN: HCPCS

## 2022-07-14 NOTE — TELEPHONE ENCOUNTER
Incoming Refill Request      Medication requested (name and dose):   melatonin 3 MG tablet        Pharmacy where request should be sent:   [facility]     Additional details provided by patient:   [facility]    Best call back number:     Does the patient have less than a 3 day supply:  [] Yes  [] No    Tiffanie Vaughan  07/14/22, 11:38 CDT

## 2022-07-15 NOTE — HOME HEALTH
Patient states that she is doing pretty well this date. States that she is getting around well with her walker and taking daily trips up and down sidewalk with  each day. States that she is having some increased pain in her R shoulder that she states has been getting a little worse since returning home from last hospitalization. Patient reports that she is going to try to see PCP and get referral to ortho MD to see what issues she might be having in shoulder. Reports doing well with all other activites at this time and ready for DC from home care services this date.

## 2022-07-19 ENCOUNTER — TELEPHONE (OUTPATIENT)
Dept: FAMILY MEDICINE CLINIC | Facility: CLINIC | Age: 62
End: 2022-07-19

## 2022-07-19 DIAGNOSIS — M54.41 CHRONIC RIGHT-SIDED LOW BACK PAIN WITH RIGHT-SIDED SCIATICA: ICD-10-CM

## 2022-07-19 DIAGNOSIS — G89.29 CHRONIC RIGHT-SIDED LOW BACK PAIN WITH RIGHT-SIDED SCIATICA: ICD-10-CM

## 2022-07-19 DIAGNOSIS — G54.6 PHANTOM PAIN AFTER AMPUTATION OF LOWER EXTREMITY: Chronic | ICD-10-CM

## 2022-07-19 RX ORDER — HYDROCODONE BITARTRATE AND ACETAMINOPHEN 7.5; 325 MG/1; MG/1
1 TABLET ORAL EVERY 8 HOURS PRN
Qty: 12 TABLET | Refills: 0 | Status: SHIPPED | OUTPATIENT
Start: 2022-07-19 | End: 2022-07-22 | Stop reason: SDUPTHER

## 2022-07-19 NOTE — CASE COMMUNICATION
Home Health Physical Therapy Agency Discharge (7/14/22)    CONDITION/PROBLEMS AT TIME OF ADMISSION (REASON FOR REFERRAL):   62 y.o. female who presented to [facility] with complaint of chest pain and shortness of breath.  The patient is admitted to hospital 6/5/22. Dr. Navas from cardiology saw the patient in consultation on 6/7/2022 for chest pain.He recommended to continue home medications Imdur and Ranexa and metopr olol.  The patient was started on amlodipine.  He felt as though the chest pain was musculoskeletal in nature and did not require any invasive evaluation. Patient remained stable over course of care and was DC home in stable condition w/ assistance from  at home. Patient reporting doing well since returning home but utilzing WC mostly for mobility when she was ambulating w/ RW and use of L prosthesis prior to admission  SUMMARY O F CAR PROVIDED: Patient received skilled PT and SN services to help patient return to PLOF and indep w/ ADLs and functional mobility in the home. SN services to monitor vitals, med ed, and reduce risk for rehospitalization d/t previous chest pain.  PROGRESSION TOWARDS GOALS: All goals met over course of care  ANY FALLS/ED VISITS/HOSPITALIZATION(S): yes, Obs for chest pain  DISCHARGE REASON:  goals me  UPCOMING MD APPTS/COUMADIN CLINIC:  8/1/22 with Marcela Lawson

## 2022-07-19 NOTE — TELEPHONE ENCOUNTER
Incoming Refill Request      Medication requested (name and dose):  HYDROcodone-acetaminophen (NORCO) 7.5-325 MG per tablet         [facility]    Additional details provided by patient:   Normally gets 120 from [facility] got sent this time    Best call back number:     Does the patient have less than a 3 day supply:  [] Yes  [] No    Tiffanie Vaughan  07/19/22, 16:20 CDT

## 2022-07-19 NOTE — TELEPHONE ENCOUNTER
Incoming Refill Request      Medication requested (name and dose):   HYDROcodone-acetaminophen (NORCO) 7.5-325 MG per tablet         Pharm[facility] where request [facility] be sent:   [facility]    Additional details provided by patient:   [facility]    Best call back number:     Does the patient have less than a 3 day supply:  [] Yes  [] No    Tiffanie Vaughan  07/19/22, 08:10 CDT

## 2022-07-20 ENCOUNTER — TELEPHONE (OUTPATIENT)
Dept: ENDOCRINOLOGY | Facility: CLINIC | Age: 62
End: 2022-07-20

## 2022-07-21 ENCOUNTER — TELEPHONE (OUTPATIENT)
Dept: FAMILY MEDICINE CLINIC | Facility: CLINIC | Age: 62
End: 2022-07-21

## 2022-07-21 NOTE — TELEPHONE ENCOUNTER
Medication requested (name and dose):  HYDROcodone-acetaminophen (NORCO) 7.5-325 MG per tablet            [facility]     Additional details provided by patient:   Normally gets 120 from [facility] got sent this time     Best call back number:      Does the patient have less than a 3 day supply:  []? Yes  []? No     Tiffanie Vaughan  07/19/22, 16:20 CDT

## 2022-07-22 ENCOUNTER — OFFICE VISIT (OUTPATIENT)
Dept: FAMILY MEDICINE CLINIC | Facility: CLINIC | Age: 62
End: 2022-07-22

## 2022-07-22 VITALS
RESPIRATION RATE: 18 BRPM | OXYGEN SATURATION: 99 % | WEIGHT: 253 LBS | HEART RATE: 75 BPM | DIASTOLIC BLOOD PRESSURE: 76 MMHG | HEIGHT: 68 IN | SYSTOLIC BLOOD PRESSURE: 122 MMHG | BODY MASS INDEX: 38.34 KG/M2 | TEMPERATURE: 97.4 F

## 2022-07-22 DIAGNOSIS — M75.41 IMPINGEMENT SYNDROME OF RIGHT SHOULDER: ICD-10-CM

## 2022-07-22 DIAGNOSIS — H66.003 ACUTE SUPPURATIVE OTITIS MEDIA OF BOTH EARS WITHOUT SPONTANEOUS RUPTURE OF TYMPANIC MEMBRANES, RECURRENCE NOT SPECIFIED: ICD-10-CM

## 2022-07-22 DIAGNOSIS — M25.511 ACUTE PAIN OF RIGHT SHOULDER: Primary | ICD-10-CM

## 2022-07-22 DIAGNOSIS — G89.29 CHRONIC RIGHT-SIDED LOW BACK PAIN WITH RIGHT-SIDED SCIATICA: ICD-10-CM

## 2022-07-22 DIAGNOSIS — M54.41 CHRONIC RIGHT-SIDED LOW BACK PAIN WITH RIGHT-SIDED SCIATICA: ICD-10-CM

## 2022-07-22 DIAGNOSIS — G54.6 PHANTOM PAIN AFTER AMPUTATION OF LOWER EXTREMITY: Chronic | ICD-10-CM

## 2022-07-22 PROCEDURE — 99213 OFFICE O/P EST LOW 20 MIN: CPT | Performed by: PHYSICIAN ASSISTANT

## 2022-07-22 RX ORDER — CEFDINIR 300 MG/1
300 CAPSULE ORAL 2 TIMES DAILY
Qty: 20 CAPSULE | Refills: 0 | Status: SHIPPED | OUTPATIENT
Start: 2022-07-22 | End: 2022-08-01

## 2022-07-22 NOTE — PROGRESS NOTES
Subjective   Ariana Martinez is a 62 y.o. female.     Earache   There is pain in both ears. The current episode started 1 to 4 weeks ago. The problem occurs constantly. The problem has been unchanged. There has been no fever. The pain is at a severity of 5/10. Pertinent negatives include no abdominal pain, coughing, diarrhea, ear discharge, headaches, hearing loss, neck pain, rash, rhinorrhea, sore throat or vomiting.   Shoulder Injury   The right shoulder is affected. The incident occurred more than 1 week ago. There was no injury mechanism. The quality of the pain is described as aching, shooting and stabbing. The pain is at a severity of 7/10. The pain is moderate. Pertinent negatives include no chest pain, muscle weakness, numbness or tingling. The symptoms are aggravated by movement and overhead lifting.        Pt presents today with c/c of bilateral ear otalgia, fullness,  and pressure, L > R x 2 weeks. PCP BEBE Villatoro. She denies rhinorrhea, nasal congestion, fever, chills, nausea, vomiting, dizziness, cough. She was previously treated for b/l acute otitis media with azithromycin with no improvement. Denies otc medication use.      She also c/o right shoulder pain x 2-3 weeks. She denies known injury or precipitating event. She reports she woke up with the pain. She does admit to pain with raising and lifting right shoulder. She does report sensation of right shoulder catching. She describes shoulder pain as constant. She describes shoulder pain as dull and achy with rest but becomes sharp and stabbing with abduction and flexion. Denies otc medication use. She is managed with hydrocodone-acetaminophen 7.5-325mg TID PRN for chronic pain. She does have hx of poorly controlled diabetes, last hgb a1c 11.1 on 6/1/22. POCT glucose 351.     The following portions of the patient's history were reviewed and updated as appropriate: allergies, current medications, past family history, past medical history, past  social history, past surgical history and problem list.    Review of Systems   Constitutional: Negative.    HENT: Positive for ear pain. Negative for congestion, ear discharge, hearing loss, rhinorrhea and sore throat.    Eyes: Negative.  Negative for blurred vision, double vision, photophobia and visual disturbance.   Respiratory: Negative.  Negative for apnea, cough, choking, chest tightness, shortness of breath, wheezing and stridor.    Cardiovascular: Negative.  Negative for chest pain, palpitations and leg swelling.   Gastrointestinal: Negative.  Negative for abdominal distention, abdominal pain, blood in stool, constipation, diarrhea, nausea, vomiting, GERD and indigestion.   Endocrine: Negative.  Negative for cold intolerance and heat intolerance.   Genitourinary: Negative.  Negative for dysuria, flank pain, frequency and urinary incontinence.   Musculoskeletal: Positive for arthralgias and gait problem. Negative for back pain and neck pain.        L AKA   Skin: Negative for rash and skin lesions.   Allergic/Immunologic: Negative.  Negative for immunocompromised state.   Neurological: Negative for tingling and numbness.   Hematological: Negative.    Psychiatric/Behavioral: Negative for agitation, behavioral problems, decreased concentration, dysphoric mood, hallucinations, self-injury, sleep disturbance, suicidal ideas, negative for hyperactivity, depressed mood and stress. The patient is not nervous/anxious.    All other systems reviewed and are negative.      Objective   Physical Exam  Vitals and nursing note reviewed.   Constitutional:       General: She is not in acute distress.     Appearance: Normal appearance. She is well-developed. She is obese. She is not ill-appearing or diaphoretic.   HENT:      Head: Normocephalic and atraumatic.      Right Ear: Hearing, ear canal and external ear normal. A middle ear effusion is present. Tympanic membrane is erythematous and bulging.      Left Ear: Hearing, ear  canal and external ear normal. A middle ear effusion is present. Tympanic membrane is erythematous and bulging.      Nose: No congestion or rhinorrhea.      Right Sinus: No maxillary sinus tenderness or frontal sinus tenderness.      Left Sinus: No maxillary sinus tenderness or frontal sinus tenderness.      Mouth/Throat:      Pharynx: Oropharynx is clear. Uvula midline.      Tonsils: No tonsillar exudate.   Eyes:      General: No scleral icterus.        Right eye: No discharge.         Left eye: No discharge.      Conjunctiva/sclera: Conjunctivae normal.      Pupils: Pupils are equal, round, and reactive to light.   Neck:      Thyroid: No thyromegaly.      Vascular: No carotid bruit or JVD.      Trachea: No tracheal deviation.   Cardiovascular:      Rate and Rhythm: Normal rate and regular rhythm.      Pulses: Normal pulses.      Heart sounds: Normal heart sounds. No murmur heard.    No friction rub. No gallop.   Pulmonary:      Effort: Pulmonary effort is normal. No respiratory distress.      Breath sounds: Normal breath sounds. No stridor. No wheezing, rhonchi or rales.   Chest:      Chest wall: No tenderness.   Abdominal:      General: Bowel sounds are normal.      Palpations: Abdomen is soft.   Musculoskeletal:         General: Deformity (left AKA) present. No tenderness.      Right shoulder: Decreased range of motion.      Cervical back: Normal range of motion and neck supple. No rigidity or tenderness.      Right lower leg: No edema.      Left lower leg: No edema.      Comments: Right shoulder: decreased ROM to flexion and abduction. + erick-bacon test.  strength 5/5.   Lymphadenopathy:      Cervical: No cervical adenopathy.   Skin:     General: Skin is warm and dry.      Capillary Refill: Capillary refill takes 2 to 3 seconds.      Coloration: Skin is not jaundiced or pale.      Findings: No bruising, erythema, lesion or rash.   Neurological:      General: No focal deficit present.      Mental  Status: She is alert and oriented to person, place, and time. Mental status is at baseline.      Motor: No weakness.      Gait: Gait normal.   Psychiatric:         Mood and Affect: Mood normal.         Behavior: Behavior normal. Behavior is cooperative.         Thought Content: Thought content normal.         Judgment: Judgment normal.       Vitals:    07/22/22 0837   BP: 122/76   Pulse: 75   Resp: 18   Temp: 97.4 °F (36.3 °C)   SpO2: 99%        Assessment & Plan   Diagnoses and all orders for this visit:    1. Acute pain of right shoulder (Primary)  -     XR Shoulder 2+ View Right; Future    2. Impingement syndrome of right shoulder  -     Ambulatory Referral to Orthopedic Surgery    3. Acute suppurative otitis media of both ears without spontaneous rupture of tympanic membranes, recurrence not specified  -     cefdinir (OMNICEF) 300 MG capsule; Take 1 capsule by mouth 2 (Two) Times a Day for 10 days.  Dispense: 20 capsule; Refill: 0        Acute b/l otitis media - new to me. I will begin the patient on omnicef 300mg BID x 10 days. I will avoid oral/IM steroids due to hx of poorly controlled diabetes, POCT glucose 351, last a1c 11.1. Advised to maintain adequate hydration and increase fluid intake. Continue supportive measures. She may use tylenol otc prn for fever. Advised no more than 1g/4h or 4g tylenol/24h from all sources.     Acute right shoulder pain - suspect this is 2/2 impingement syndrome of right shoulder. I will obtain an xr right shoulder as above for further evaluation and assessment. I will notify pt of results when received. I will avoid oral/IM steroids due to hx of poorly controlled diabetes, POCT glucose 351, last a1c 11.1. She will continue her hydrocodone-acetaminophen 7.5-325mg TID PRN as prescribed per her pcp, BEBE Villatoro. Continue with activity as tolerated but I have recommended rest and activity modification during times of increased pain.  Continue with use of ice and/or heat  therapy as needed to minimize pain/inflammation/muscle spasms.    Patient educated to follow up as scheduled with pcp, Kimberly Ferguson, APRN or sooner than next scheduled appointment if symptoms worsen or do not improve. Patient stated understanding and has agreed with plan of care. After visit summary was printed and given to patient.      This document has been electronically signed by Ethel Landaverde PA-C on July 22, 2022 17:39 CDT,.

## 2022-07-25 ENCOUNTER — TELEPHONE (OUTPATIENT)
Dept: FAMILY MEDICINE CLINIC | Facility: CLINIC | Age: 62
End: 2022-07-25

## 2022-07-25 RX ORDER — HYDROCODONE BITARTRATE AND ACETAMINOPHEN 7.5; 325 MG/1; MG/1
1 TABLET ORAL EVERY 6 HOURS PRN
Qty: 108 TABLET | Refills: 0 | Status: SHIPPED | OUTPATIENT
Start: 2022-07-25 | End: 2022-08-18 | Stop reason: SDUPTHER

## 2022-08-01 ENCOUNTER — TELEPHONE (OUTPATIENT)
Dept: FAMILY MEDICINE CLINIC | Facility: CLINIC | Age: 62
End: 2022-08-01

## 2022-08-01 NOTE — TELEPHONE ENCOUNTER
Pt called stating she went to [facility] in [place] with Covid Symptoms her rapid tested neg and the PCR test came back inclusive but  is postive, she was wanting cough med and an antibiotic, her ins will not cover telehealth visits. She doesn't want to go back and test again since the first one cost her $75.

## 2022-08-02 NOTE — TELEPHONE ENCOUNTER
Her insurance will not let her do a tele health visit. She was seen at [facility] in [place]. Her  has covid so she cannot come into the PT department.

## 2022-08-04 DIAGNOSIS — Z20.822 CLOSE EXPOSURE TO COVID-19 VIRUS: ICD-10-CM

## 2022-08-04 DIAGNOSIS — R05.9 COUGH: Primary | ICD-10-CM

## 2022-08-04 RX ORDER — BENZONATATE 100 MG/1
100 CAPSULE ORAL 3 TIMES DAILY PRN
Qty: 30 CAPSULE | Refills: 0 | Status: SHIPPED | OUTPATIENT
Start: 2022-08-04 | End: 2022-09-07

## 2022-08-05 DIAGNOSIS — I25.118 CORONARY ARTERY DISEASE OF NATIVE ARTERY OF NATIVE HEART WITH STABLE ANGINA PECTORIS: Chronic | ICD-10-CM

## 2022-08-05 RX ORDER — CLOPIDOGREL BISULFATE 75 MG/1
75 TABLET ORAL DAILY
Qty: 90 TABLET | Refills: 0 | Status: SHIPPED | OUTPATIENT
Start: 2022-08-05 | End: 2022-10-03 | Stop reason: SDUPTHER

## 2022-08-05 NOTE — TELEPHONE ENCOUNTER
Incoming Refill Request      Medication requested (name and dose):  clopidogrel (PLAVIX) 75 MG tablet     Pharmacy where request should be sent:   [facility]    Additional details provided by patient:   [facility]    Best call back number:   Does the patient have less than a 3 day supply:  [] Yes  [] No    Tiffanie Vaughan  08/05/22, 13:48 CDT

## 2022-08-18 ENCOUNTER — OFFICE VISIT (OUTPATIENT)
Dept: ORTHOPEDIC SURGERY | Facility: CLINIC | Age: 62
End: 2022-08-18

## 2022-08-18 VITALS — WEIGHT: 253 LBS | HEIGHT: 68 IN | BODY MASS INDEX: 38.34 KG/M2

## 2022-08-18 DIAGNOSIS — E11.65 TYPE 2 DIABETES MELLITUS WITH HYPERGLYCEMIA, WITH LONG-TERM CURRENT USE OF INSULIN: ICD-10-CM

## 2022-08-18 DIAGNOSIS — F41.1 GAD (GENERALIZED ANXIETY DISORDER): ICD-10-CM

## 2022-08-18 DIAGNOSIS — G54.6 PHANTOM PAIN AFTER AMPUTATION OF LOWER EXTREMITY: Chronic | ICD-10-CM

## 2022-08-18 DIAGNOSIS — G89.29 CHRONIC RIGHT-SIDED LOW BACK PAIN WITH RIGHT-SIDED SCIATICA: ICD-10-CM

## 2022-08-18 DIAGNOSIS — K21.9 GERD WITHOUT ESOPHAGITIS: ICD-10-CM

## 2022-08-18 DIAGNOSIS — M75.101 ROTATOR CUFF SYNDROME, RIGHT: ICD-10-CM

## 2022-08-18 DIAGNOSIS — Z79.4 TYPE 2 DIABETES MELLITUS WITH HYPERGLYCEMIA, WITH LONG-TERM CURRENT USE OF INSULIN: ICD-10-CM

## 2022-08-18 DIAGNOSIS — M54.41 CHRONIC RIGHT-SIDED LOW BACK PAIN WITH RIGHT-SIDED SCIATICA: ICD-10-CM

## 2022-08-18 DIAGNOSIS — M25.511 ACUTE PAIN OF RIGHT SHOULDER: Primary | ICD-10-CM

## 2022-08-18 PROCEDURE — 99204 OFFICE O/P NEW MOD 45 MIN: CPT | Performed by: ORTHOPAEDIC SURGERY

## 2022-08-18 RX ORDER — MELOXICAM 15 MG/1
TABLET ORAL
Qty: 30 TABLET | Refills: 3 | Status: SHIPPED | OUTPATIENT
Start: 2022-08-18 | End: 2022-09-07

## 2022-08-18 RX ORDER — HYDROCODONE BITARTRATE AND ACETAMINOPHEN 7.5; 325 MG/1; MG/1
1 TABLET ORAL EVERY 6 HOURS PRN
Qty: 120 TABLET | Refills: 0 | Status: SHIPPED | OUTPATIENT
Start: 2022-08-18 | End: 2022-09-07 | Stop reason: SDUPTHER

## 2022-08-18 NOTE — PROGRESS NOTES
Ariana Martinez is a 62 y.o. female   Primary provider:  Kimberly Ferguson APRN       Chief Complaint   Patient presents with   • Right Shoulder - Initial Evaluation, Pain       HISTORY OF PRESENT ILLNESS:    Patient reports pain lifting her right arm.  She also reports pain at night.  She had a prior rotator cuff tear in October 2006.  No new injury.  She has been having pain for about 8 weeks.  No numbness or tingling.  Pain is worse with overhead activity and pain with lifting, pulling, and tugging.    Shoulder Injury   The right shoulder is affected. Incident onset: 1.5 months ago. There was no injury mechanism. The quality of the pain is described as aching and burning. The pain is severe. Associated symptoms comments: Clicking/popping/snapping. She has tried rest for the symptoms.        CONCURRENT MEDICAL HISTORY:    Past Medical History:   Diagnosis Date   • Acute bacterial endocarditis 3/13/2021   • Angina, class IV ([facility])    • Anxiety    • Anxiety and depression    • Arthritis    • Benign paroxysmal positional vertigo    • Bladder disorder     has bladder stimulator   • Carpal tunnel syndrome    • CHF (congestive heart failure) ([facility])    • Chronic pain    • Coronary atherosclerosis     hx CABG 2005.  is followed by Dr Kwon   • Depression    • Diabetes mellitus ([facility])     Type 2, controlled   • Diabetic polyneuropathy ([facility])    • Disease of thyroid gland    • Elevated cholesterol    • Female stress incontinence    • Foot pain, left    • Full dentures    • Gastroparesis    • GERD (gastroesophageal reflux disease)    • Hyperlipidemia    • Hypertension    • Low back pain    • Malaise and fatigue    • Multiple joint pain    • Obesity     Refuses to be weighed   • Occlusion and stenosis of bilateral carotid arteries 6/18/2021   • Otalgia     Both   • Perforation of tympanic membrane     Left   • Postoperative wound infection    • Vitamin D deficiency    • Wears glasses     reading       Allergies   Allergen  Reactions   • Seroquel [Quetiapine] Anaphylaxis   • Avandia [Rosiglitazone] Swelling   • Morphine And Related Hallucinations   • Oxycodone Hallucinations         Current Outpatient Medications:   •  amLODIPine (NORVASC) 5 MG tablet, Take 1 tablet by mouth Daily., Disp: 90 tablet, Rfl: 1  •  ARIPiprazole (ABILIFY) 5 MG tablet, Take 1 tablet by mouth Daily., Disp: 90 tablet, Rfl: 1  •  aspirin  MG tablet, Take 1 tablet by mouth Daily. (Patient taking differently: Take 81 mg by mouth Daily.), Disp: 30 tablet, Rfl: 0  •  atorvastatin (LIPITOR) 80 MG tablet, TAKE 1 TABLET BY MOUTH EVERY DAY, Disp: 90 tablet, Rfl: 1  •  BD SHARPS CONTAINER HOME misc, 1 each Take As Directed., Disp: 1 each, Rfl: 0  •  benzonatate (Tessalon Perles) 100 MG capsule, Take 1 capsule by mouth 3 (Three) Times a Day As Needed for Cough., Disp: 30 capsule, Rfl: 0  •  Blood Glucose Monitoring Suppl (ONE TOUCH ULTRA MINI) w/Device kit, Patient will need the Accu-Check Avitio meter, Disp: 1 each, Rfl: 0  •  Calcium Citrate-Vitamin D 250-200 MG-UNIT tablet, Take 2 tablets by mouth 2 (two) times a day., Disp: , Rfl:   •  clopidogrel (PLAVIX) 75 MG tablet, Take 1 tablet by mouth Daily., Disp: 90 tablet, Rfl: 0  •  cyanocobalamin 1000 MCG/ML injection, INJECT 1 ML INTO THE APPROPRIATE MUSCLE AS DIRECTED BY PRESCRIBER EVERY 28 (TWENTY-EIGHT) DAYS., Disp: 3 mL, Rfl: 2  •  folic acid (FOLVITE) 1 MG tablet, TAKE 1 TABLET BY MOUTH EVERY DAY, Disp: 90 tablet, Rfl: 1  •  furosemide (LASIX) 40 MG tablet, Take 1 tablet by mouth Daily., Disp: 30 tablet, Rfl: 0  •  gabapentin (NEURONTIN) 100 MG capsule, Take 1 capsule by mouth Every 12 (Twelve) Hours., Disp: 60 capsule, Rfl: 2  •  glucose blood (Accu-Chek Guide) test strip, 1 each by Other route 4 (Four) Times a Day. To test blood sugar 4X daily. For  Dx E11.65, Disp: 600 each, Rfl: 1  •  glucose monitor monitoring kit, 1 each Daily. accucheck eve meter, E11.9, Disp: 1 each, Rfl: 0  •   HYDROcodone-acetaminophen (NORCO) 7.5-325 MG per tablet, Take 1 tablet by mouth Every 6 (Six) Hours As Needed for Moderate Pain ., Disp: 120 tablet, Rfl: 0  •  insulin aspart (NovoLOG FlexPen) 100 UNIT/ML solution pen-injector sc pen, INJECT 0 TO 14 UNITS UNDER THE SKIN 3 TIMES A DAY BEFORE MEALS ACCORDING TO SLIDING SCALE (Patient taking differently: 30 Units 3 (Three) Times a Day With Meals.), Disp: 5 pen, Rfl: 5  •  Insulin Glargine (BASAGLAR KWIKPEN) 100 UNIT/ML injection pen, INJECT 24 UNITS SUBCUTANEOUSLY AT BEDTIME, Disp: 1 pen, Rfl: 7  •  Insulin Pen Needle (B-D ULTRAFINE III SHORT PEN) 31G X 8 MM misc, USE TO INJECT 5 TIMES A DAY, Disp: 150 each, Rfl: 11  •  Insulin Pen Needle 31G X 8 MM misc, Use up to 4 (four) times daily with insulin as directed., Disp: 100 each, Rfl: 0  •  isosorbide mononitrate (IMDUR) 30 MG 24 hr tablet, Take 1 tablet by mouth Daily., Disp: 30 tablet, Rfl: 3  •  Lancets (accu-chek soft touch) lancets, As directed, Disp: 100 each, Rfl: 12  •  meclizine (ANTIVERT) 25 MG tablet, Take 1 tablet by mouth 3 (Three) Times a Day As Needed for dizziness., Disp: 90 tablet, Rfl: 6  •  melatonin 3 MG tablet, Take 2 tablets by mouth Every Night. May take 1 if this works, or may take 2 as single dose if more effective., Disp: 60 tablet, Rfl: 5  •  meloxicam (MOBIC) 15 MG tablet, 1 PO Daily with food., Disp: 30 tablet, Rfl: 3  •  methocarbamol (ROBAXIN) 500 MG tablet, TAKE 1 TABLET BY MOUTH 2 TIMES A DAY AS NEEDED FOR MUSCLE SPASMS., Disp: 60 tablet, Rfl: 5  •  metoclopramide (REGLAN) 10 MG tablet, Take 1 tablet by mouth 4 (Four) Times a Day As Needed (nausea)., Disp: 120 tablet, Rfl: 1  •  metoprolol succinate XL (TOPROL-XL) 50 MG 24 hr tablet, TAKE 1 TABLET BY MOUTH DAILY. DOSE INCREASED 5/24/21, Disp: 90 tablet, Rfl: 1  •  montelukast (SINGULAIR) 10 MG tablet, Take 1 tablet by mouth Every Night. To help with allergies causing runny nose, Disp: 30 tablet, Rfl: 5  •  naloxone (NARCAN) 0.4 MG/ML  "injection, Infuse 0.5 mL into a venous catheter Every 5 (Five) Minutes As Needed for Opioid Reversal., Disp: , Rfl:   •  Needle, Disp, (Easy Touch FlipLock Needles) 25G X 1-1/2\" misc, 1 each Every 28 (Twenty-Eight) Days. For administering B12 cyanocobalomin. Dx vitamin B12 deficiency., Disp: 10 each, Rfl: 3  •  Needle, Disp, (Hypodermic Needle) 20G X 1\" misc, 1 each Every 28 (Twenty-Eight) Days. For drawing up B12 for injection monthly, change back to smaller gauge needle prior to injection. Dx Vitamin B12  Deficiency., Disp: 10 each, Rfl: 2  •  nitroglycerin (NITROSTAT) 0.4 MG SL tablet, Place 1 tablet under the tongue Every 5 (Five) Minutes As Needed for Chest Pain. Take no more than 3 doses in 15 minutes., Disp: 20 tablet, Rfl: 11  •  NovoLOG 100 UNIT/ML injection, INJECT 8 UNITS SUBCUTANEOUSLY 3 TIMES DAILY WITH MEALS. (Patient taking differently: Flex pen already charted), Disp: 20 mL, Rfl: 3  •  ondansetron (ZOFRAN) 8 MG tablet, TAKE 1 TABLET BY MOUTH EVERY 8 (EIGHT) HOURS AS NEEDED FOR NAUSEA OR VOMITING., Disp: 18 tablet, Rfl: 1  •  pantoprazole (PROTONIX) 20 MG EC tablet, Take 2 tablets by mouth Daily., Disp: 90 tablet, Rfl: 3  •  ranolazine (RANEXA) 500 MG 12 hr tablet, Take 1 tablet by mouth Every 12 (Twelve) Hours., Disp: 180 tablet, Rfl: 3  •  sucralfate (CARAFATE) 1 g tablet, Take 1 tablet by mouth 4 (Four) Times a Day Before Meals & at Bedtime., Disp: 120 tablet, Rfl: 0  •  Syringe 20G X 1-1/2\" 3 ML misc, 1 each Every 28 (Twenty-Eight) Days. For injection cyanocobalomin, Dx vit B12 deficiency., Disp: 10 each, Rfl: 2  •  venlafaxine XR (EFFEXOR-XR) 75 MG 24 hr capsule, Take 1 capsule by mouth Daily With Breakfast., Disp: 90 capsule, Rfl: 1  •  vitamin D (ERGOCALCIFEROL) 1.25 MG (26893 UT) capsule capsule, Take 1 capsule by mouth Every 7 (Seven) Days., Disp: 36 capsule, Rfl: 1    Past Surgical History:   Procedure Laterality Date   • ABDOMINAL SURGERY     • AMPUTATION FOOT     • ANGIOPLASTY      " coronary   • ANKLE ARTHROSCOPY Left 2/26/2021    Procedure: Left foot hardware removal, ankle arthroscopy, bone grafting , left foot exostectomy;  Surgeon: Ignacio Lord DPM;  Location: [facility] OR;  Service: Podiatry;  Laterality: Left;   • BREAST BIOPSY Right    • CARDIAC CATHETERIZATION     • CARDIAC CATHETERIZATION N/A 6/20/2017    Procedure: Right Heart Cath;  Surgeon: Can Kwon MD PhD;  Location: [facility] CATH INVASIVE LOCATION;  Service:    • CARDIAC CATHETERIZATION N/A 2/18/2020    Procedure: Left Heart Cath;  Surgeon: Catalina Cooper MD;  Location: [facility] CATH INVASIVE LOCATION;  Service: Cardiology;  Laterality: N/A;   • CARDIAC CATHETERIZATION N/A 4/6/2022    Procedure: Left Heart Cath;  Surgeon: Sheryl Navas MD;  Location: [facility] CATH INVASIVE LOCATION;  Service: Cardiology;  Laterality: N/A;   • CARPAL TUNNEL RELEASE     • CHOLECYSTECTOMY     • COLONOSCOPY N/A 6/24/2020    Procedure: COLONOSCOPY;  Surgeon: Julián Maldonado MD;  Location: [facility] ENDOSCOPY;  Service: Gastroenterology;  Laterality: N/A;   • CORONARY ARTERY BYPASS GRAFT  2005   • ENDOSCOPY N/A 10/19/2018    Procedure: ESOPHAGOGASTRODUODENOSCOPY possible dilation;  Surgeon: Julián Maldonado MD;  Location: [facility] ENDOSCOPY;  Service: Gastroenterology   • ENDOSCOPY N/A 6/24/2020    Procedure: ESOPHAGOGASTRODUODENOSCOPY WED appt please;  Surgeon: Julián Maldonado MD;  Location: [facility] ENDOSCOPY;  Service: Gastroenterology;  Laterality: N/A;   • ENDOSCOPY N/A 6/10/2022    Procedure: ESOPHAGOGASTRODUODENOSCOPY   room 380;  Surgeon: Jeremiah Wilkins MD;  Location: [facility] ENDOSCOPY;  Service: Gastroenterology;  Laterality: N/A;   • ENDOSCOPY AND COLONOSCOPY     • FOOT SURGERY      Toes   • FOOT SURGERY     • GASTRIC BANDING      Revision, laparoscopic   • HYSTERECTOMY     • INCISION AND DRAINAGE LEG Left 3/12/2021    Procedure: Left ankle arthroscopic irrigation and debridement, screw removal;  Surgeon: Ignacio Lord  ZACHARY;  Location: [facility];  Service: Podiatry;  Laterality: Left;   • MOUTH SURGERY     • SALPINGO OOPHORECTOMY     • SHOULDER SURGERY     • SUBTALAR ARTHRODESIS Left 1/16/2019    Procedure: LEFT FOOT HARDWARE REMOVAL, FIFTH METATARSAL , OPEN REDUCTION INTERNAL FIXATION, CALCANEAL OSTEOTOMY;  Surgeon: Ignacio Lord DPM;  Location: [facility] OR;  Service: Podiatry   • SUBTALAR ARTHRODESIS Left 10/16/2019    Procedure: foot hardware removal, subtalar joint fusion  possible de/reattachment of achilles tendon        (c-arm);  Surgeon: Ignacio Lord DPM;  Location: [facility] OR;  Service: Podiatry   • SUBTALAR ARTHRODESIS Left 9/30/2020    Procedure: subtalar, talonavicular joint arthrodesis.  Removal hardware.          (c-arm);  Surgeon: Ignacio Lord DPM;  Location: [facility];  Service: Podiatry;  Laterality: Left;   • TRANSESOPHAGEAL ECHOCARDIOGRAM (LAMONTE)      With color flow       Family History   Problem Relation Age of Onset   • Diabetes Other    • Heart disease Other    • Hypertension Other    • Heart disease Mother    • Stroke Mother    • Hypertension Mother    • Diabetes Sister    • Heart disease Sister    • Hypertension Sister    • Heart disease Sister    • Diabetes Sister    • Hypertension Sister    • Diabetes Sister    • Diabetes Sister    • Diabetes Sister    • Diabetes Sister         Social History     Socioeconomic History   • Marital status:    Tobacco Use   • Smoking status: Never Smoker   • Smokeless tobacco: Never Used   Vaping Use   • Vaping Use: Never used   Substance and Sexual Activity   • Alcohol use: No   • Drug use: No   • Sexual activity: Defer        Review of Systems   Constitutional: Negative for chills and fever.   Respiratory: Negative.    Cardiovascular: Negative.    Gastrointestinal: Negative.    Musculoskeletal:        Right shoulder pain   Psychiatric/Behavioral:        Anxiety   All other systems reviewed and are negative.      PHYSICAL EXAMINATION:       Ht 172.7 cm  "(67.99\")   Wt 115 kg (253 lb)   BMI 38.48 kg/m²     Physical Exam  Constitutional:       General: She is not in acute distress.     Appearance: Normal appearance.   Pulmonary:      Effort: Pulmonary effort is normal. No respiratory distress.   Neurological:      Mental Status: She is alert and oriented to person, place, and time.         GAIT:     []  Normal  [x]  Antalgic    Assistive device: []  None  [x]  Walker     []  Crutches  []  Cane     []  Wheelchair  []  Stretcher    Right Shoulder Exam     Tenderness   The patient is experiencing tenderness in the acromioclavicular joint and acromion.    Range of Motion   Active abduction: 120 (with pain)   Forward flexion: 140     Muscle Strength   Abduction: 4/5   Supraspinatus: 4/5     Tests   Chou test: positive  Cross arm: positive  Impingement: positive    Other   Erythema: absent  Sensation: normal  Pulse: present                XR Shoulder 2+ View Right  Order date: 7/22/2022  Authorizing: Ethel Landaverde PA-C  Ordered by Ethel Landaverde PA-C on 7/22/2022.       Narrative & Impression       Three view right shoulder     HISTORY: Right shoulder pain     AP films with the humerus in internal and external rotation and  scapular Y view were obtained.     COMPARISON: Left shoulder February 5, 2018     FINDINGS:   No fracture or dislocation.  Hypertrophic change acromioclavicular joint.  No other osseous or articular abnormality.   Sternotomy.     IMPRESSION:  CONCLUSION:  Hypertrophic change acromioclavicular joint.     [id number]     Electronically signed by:  David Gamboa MD  7/22/2022 8:14 PM CDT  Workstation: [id number]       Lab Results   Component Value Date    HGBA1C 11.10 (H) 06/01/2022           ASSESSMENT:    Diagnoses and all orders for this visit:    Acute pain of right shoulder  -     CT Angiogram Upper Extremity Right With & Without Contrast; Future    MAURICIO (generalized anxiety disorder)    GERD without esophagitis    Type 2 diabetes mellitus with " hyperglycemia, with long-term current use of insulin (HCC)    Rotator cuff syndrome, right    Other orders  -     meloxicam (MOBIC) 15 MG tablet; 1 PO Daily with food.          PLAN    Patient has pain in the right shoulder.  No specific injury.  Her pain is worse with activity especially overhead activity.  She has a bladder stimulator and cannot have an MRI.  We discussed proceeding with CT arthrogram for further evaluation of the rotator cuff and to check the integrity of the previous repair.    We also discussed beginning meloxicam for anti-inflammatory medication.  Slowly progress activity as tolerated.  Follow-up after the CT arthrogram to go over the results and to discuss further treatment options.      Return for recheck for CT results.    Rohan Lazo MD

## 2022-08-18 NOTE — TELEPHONE ENCOUNTER
Incoming Refill Request      Medication requested (name and dose):   HYDROcodone-acetaminophen (NORCO) 7.5-325 MG per tablet       Pharmacy where request should be sent:  [facility]     Additional details provided by patient:   [facility]//pt said needs 120 only called in 108 last month     Best call back number:     Does the patient have less than a 3 day supply:  [] Yes  [] No    Tiffanie Vaughan  08/18/22, 10:55 CDT

## 2022-08-19 ENCOUNTER — PATIENT ROUNDING (BHMG ONLY) (OUTPATIENT)
Dept: ORTHOPEDIC SURGERY | Facility: CLINIC | Age: 62
End: 2022-08-19

## 2022-08-19 NOTE — PROGRESS NOTES
August 19, 2022    Hello, may I speak with Ariana Martinez?    My name is REJI DIMAS     I am  with [facility]  [address] [place] [address] [phone].    Before we get started may I verify your date of birth? 1960    I am calling to officially welcome you to our practice and ask about your recent visit. Is this a good time to talk? yes    Tell me about your visit with us. What things went well?  IT WAS A GREAT VISIT. EVERYONE WAS KIND. ALL MY QUESTIONS WERE ANSWERED.       We're always looking for ways to make our patients' experiences even better. Do you have recommendations on ways we may improve?  no    Overall were you satisfied with your first visit to our practice? yes       I appreciate you taking the time to speak with me today. Is there anything else I can do for you? no      Thank you, and have a great day.

## 2022-08-21 DIAGNOSIS — E78.2 MIXED HYPERLIPIDEMIA: Chronic | ICD-10-CM

## 2022-08-22 RX ORDER — FOLIC ACID 1 MG/1
TABLET ORAL
Qty: 90 TABLET | Refills: 1 | Status: SHIPPED | OUTPATIENT
Start: 2022-08-22

## 2022-08-22 NOTE — TELEPHONE ENCOUNTER
Rx Refill Note  Requested Prescriptions     Pending Prescriptions Disp Refills   • folic acid (FOLVITE) 1 MG tablet [Pharmacy Med Name: FOLIC ACID 1 MG TABLET] 90 tablet 1     Sig: TAKE 1 TABLET BY MOUTH EVERY DAY      Last office visit with prescribing clinician: 4/25/2022      Next office visit with prescribing clinician: 8/29/2022            Chandana FAULKNER Rep  08/22/22, 08:55 CDT

## 2022-08-23 DIAGNOSIS — J30.9 CHRONIC ALLERGIC RHINITIS: ICD-10-CM

## 2022-08-24 RX ORDER — MONTELUKAST SODIUM 10 MG/1
10 TABLET ORAL NIGHTLY
Qty: 90 TABLET | Refills: 1 | Status: SHIPPED | OUTPATIENT
Start: 2022-08-24 | End: 2022-09-23

## 2022-08-24 RX ORDER — ATORVASTATIN CALCIUM 80 MG/1
TABLET, FILM COATED ORAL
Qty: 90 TABLET | Refills: 1 | Status: SHIPPED | OUTPATIENT
Start: 2022-08-24 | End: 2022-09-07 | Stop reason: SDUPTHER

## 2022-08-29 ENCOUNTER — OFFICE VISIT (OUTPATIENT)
Dept: ONCOLOGY | Facility: CLINIC | Age: 62
End: 2022-08-29

## 2022-08-29 ENCOUNTER — LAB (OUTPATIENT)
Dept: ONCOLOGY | Facility: HOSPITAL | Age: 62
End: 2022-08-29

## 2022-08-29 VITALS
BODY MASS INDEX: 36.81 KG/M2 | RESPIRATION RATE: 18 BRPM | TEMPERATURE: 97.8 F | SYSTOLIC BLOOD PRESSURE: 153 MMHG | HEART RATE: 77 BPM | OXYGEN SATURATION: 97 % | DIASTOLIC BLOOD PRESSURE: 68 MMHG | WEIGHT: 242 LBS

## 2022-08-29 DIAGNOSIS — D50.0 IRON DEFICIENCY ANEMIA DUE TO CHRONIC BLOOD LOSS: Chronic | ICD-10-CM

## 2022-08-29 DIAGNOSIS — G89.29 CHRONIC RIGHT-SIDED LOW BACK PAIN WITH RIGHT-SIDED SCIATICA: ICD-10-CM

## 2022-08-29 DIAGNOSIS — M54.41 CHRONIC RIGHT-SIDED LOW BACK PAIN WITH RIGHT-SIDED SCIATICA: ICD-10-CM

## 2022-08-29 LAB
BASOPHILS # BLD AUTO: 0.05 10*3/MM3 (ref 0–0.2)
BASOPHILS NFR BLD AUTO: 0.4 % (ref 0–1.5)
DEPRECATED RDW RBC AUTO: 44.4 FL (ref 37–54)
EOSINOPHIL # BLD AUTO: 0.48 10*3/MM3 (ref 0–0.4)
EOSINOPHIL NFR BLD AUTO: 3.5 % (ref 0.3–6.2)
ERYTHROCYTE [DISTWIDTH] IN BLOOD BY AUTOMATED COUNT: 14.5 % (ref 12.3–15.4)
FERRITIN SERPL-MCNC: 97.78 NG/ML (ref 13–150)
FOLATE SERPL-MCNC: >20 NG/ML (ref 4.78–24.2)
HCT VFR BLD AUTO: 39 % (ref 34–46.6)
HGB BLD-MCNC: 13.2 G/DL (ref 12–15.9)
IMM GRANULOCYTES # BLD AUTO: 0.07 10*3/MM3 (ref 0–0.05)
IMM GRANULOCYTES NFR BLD AUTO: 0.5 % (ref 0–0.5)
IRON 24H UR-MRATE: 51 MCG/DL (ref 37–145)
IRON SATN MFR SERPL: 13 % (ref 20–50)
LYMPHOCYTES # BLD AUTO: 2.05 10*3/MM3 (ref 0.7–3.1)
LYMPHOCYTES NFR BLD AUTO: 14.8 % (ref 19.6–45.3)
MCH RBC QN AUTO: 28.8 PG (ref 26.6–33)
MCHC RBC AUTO-ENTMCNC: 33.8 G/DL (ref 31.5–35.7)
MCV RBC AUTO: 85.2 FL (ref 79–97)
MONOCYTES # BLD AUTO: 0.75 10*3/MM3 (ref 0.1–0.9)
MONOCYTES NFR BLD AUTO: 5.4 % (ref 5–12)
NEUTROPHILS NFR BLD AUTO: 10.43 10*3/MM3 (ref 1.7–7)
NEUTROPHILS NFR BLD AUTO: 75.4 % (ref 42.7–76)
NRBC BLD AUTO-RTO: 0 /100 WBC (ref 0–0.2)
PLATELET # BLD AUTO: 440 10*3/MM3 (ref 140–450)
PMV BLD AUTO: 9.7 FL (ref 6–12)
RBC # BLD AUTO: 4.58 10*6/MM3 (ref 3.77–5.28)
TIBC SERPL-MCNC: 396 MCG/DL (ref 298–536)
TRANSFERRIN SERPL-MCNC: 266 MG/DL (ref 200–360)
VIT B12 BLD-MCNC: 516 PG/ML (ref 211–946)
WBC NRBC COR # BLD: 13.83 10*3/MM3 (ref 3.4–10.8)

## 2022-08-29 PROCEDURE — 82746 ASSAY OF FOLIC ACID SERUM: CPT

## 2022-08-29 PROCEDURE — G0463 HOSPITAL OUTPT CLINIC VISIT: HCPCS | Performed by: NURSE PRACTITIONER

## 2022-08-29 PROCEDURE — 85025 COMPLETE CBC W/AUTO DIFF WBC: CPT

## 2022-08-29 PROCEDURE — 83540 ASSAY OF IRON: CPT

## 2022-08-29 PROCEDURE — 84466 ASSAY OF TRANSFERRIN: CPT

## 2022-08-29 PROCEDURE — 82607 VITAMIN B-12: CPT

## 2022-08-29 PROCEDURE — 82728 ASSAY OF FERRITIN: CPT

## 2022-08-29 PROCEDURE — 99214 OFFICE O/P EST MOD 30 MIN: CPT | Performed by: NURSE PRACTITIONER

## 2022-08-29 RX ORDER — ISOSORBIDE MONONITRATE 30 MG/1
30 TABLET, EXTENDED RELEASE ORAL
Qty: 30 TABLET | Refills: 3 | Status: SHIPPED | OUTPATIENT
Start: 2022-08-29 | End: 2022-09-06

## 2022-08-29 NOTE — TELEPHONE ENCOUNTER
Incoming Refill Request      Medication requested (name and dose):   isosorbide mononitrate (IMDUR) 30 MG 24 hr tablet         Pharmacy where request should be sent:   [facility]     Additional details provided by patient:   [facility]    Best call back number:     Does the patient have less than a 3 day supply:  [] Yes  [] No    Tiffanie Vaughan  08/29/22, 10:11 CDT

## 2022-08-30 ENCOUNTER — OFFICE VISIT (OUTPATIENT)
Dept: GASTROENTEROLOGY | Facility: CLINIC | Age: 62
End: 2022-08-30

## 2022-08-30 VITALS
BODY MASS INDEX: 38.83 KG/M2 | HEIGHT: 68 IN | DIASTOLIC BLOOD PRESSURE: 61 MMHG | SYSTOLIC BLOOD PRESSURE: 117 MMHG | HEART RATE: 83 BPM | WEIGHT: 256.2 LBS

## 2022-08-30 DIAGNOSIS — K21.00 GASTROESOPHAGEAL REFLUX DISEASE WITH ESOPHAGITIS WITHOUT HEMORRHAGE: Primary | ICD-10-CM

## 2022-08-30 DIAGNOSIS — K31.84 GASTROPARESIS: ICD-10-CM

## 2022-08-30 PROCEDURE — 99213 OFFICE O/P EST LOW 20 MIN: CPT | Performed by: NURSE PRACTITIONER

## 2022-08-30 NOTE — PROGRESS NOTES
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  Due to iron deficiency and inability to tolerate iron by mouth, patient has received IV iron infusions in the past; most recently she received IV iron in 2020.  She is receiving IM Monthly B-12 injections at home.  Most recent EGD in June 2022 did not show any identifiable source of bleeding, did identify significant erythema and inflammation.      Past Medical History, Past Surgical History, Social History, Family History have been reviewed and are without significant changes except as mentioned.    Review of Systems   A comprehensive 14 point review of systems was performed and was negative except as mentioned.    Medications:  The current medication list was reviewed in the EMR    ALLERGIES:    Allergies   Allergen Reactions   • Seroquel [Quetiapine] Anaphylaxis   • Avandia [Rosiglitazone] Swelling   • Morphine And Related Hallucinations   • Oxycodone Hallucinations       Objective      Vitals:    08/29/22 1258   BP: 153/68   Pulse: 77   Resp: 18   Temp: 97.8 °F (36.6 °C)   SpO2: 97%   Weight: 110 kg (242 lb)   PainSc:   5     Current Status 4/22/2021   ECOG score 4       Physical Exam  General: alert and oriented no acute distress  Lungs; CTAB  Card:RRR    RECENT LABS:  Glucose   Date Value Ref Range Status   07/06/2022 252 (H) 65 - 99 mg/dL Final     Glucose, Arterial   Date Value Ref Range Status   10/14/2017 155 mmol/L Final     Sodium   Date Value Ref Range Status   07/06/2022 139 136 - 145 mmol/L Final     Potassium   Date  Value Ref Range Status   07/06/2022 4.1 3.5 - 5.2 mmol/L Final   03/18/2021 3.6 3.5 - 5.4 MMOL/L Final     CO2   Date Value Ref Range Status   07/06/2022 29.0 22.0 - 29.0 mmol/L Final     Chloride   Date Value Ref Range Status   07/06/2022 101 98 - 107 mmol/L Final     Anion Gap   Date Value Ref Range Status   07/06/2022 9.0 5.0 - 15.0 mmol/L Final     Creatinine   Date Value Ref Range Status   07/06/2022 0.80 0.57 - 1.00 mg/dL Final     BUN   Date Value Ref Range Status   07/06/2022 7 (L) 8 - 23 mg/dL Final     BUN/Creatinine Ratio   Date Value Ref Range Status   07/06/2022 8.8 7.0 - 25.0 Final     Calcium   Date Value Ref Range Status   07/06/2022 9.5 8.6 - 10.5 mg/dL Final     eGFR Non  Amer   Date Value Ref Range Status   02/07/2022 45 (L) >60 mL/min/1.73 Final     Alkaline Phosphatase   Date Value Ref Range Status   07/04/2022 98 39 - 117 U/L Final     Total Protein   Date Value Ref Range Status   07/04/2022 6.3 6.0 - 8.5 g/dL Final     ALT (SGPT)   Date Value Ref Range Status   07/04/2022 12 1 - 33 U/L Final     AST (SGOT)   Date Value Ref Range Status   07/04/2022 12 1 - 32 U/L Final     Total Bilirubin   Date Value Ref Range Status   07/04/2022 0.4 0.0 - 1.2 mg/dL Final     Albumin   Date Value Ref Range Status   07/04/2022 3.70 3.50 - 5.20 g/dL Final     Globulin   Date Value Ref Range Status   07/04/2022 2.6 gm/dL Final     Lab Results   Component Value Date    WBC 13.83 (H) 08/29/2022    HGB 13.2 08/29/2022    HCT 39.0 08/29/2022    MCV 85.2 08/29/2022     08/29/2022     Lab Results   Component Value Date    NEUTROABS 10.43 (H) 08/29/2022    IRON 51 08/29/2022    IRON 47 04/25/2022    IRON 75 11/30/2021    TIBC 396 08/29/2022    TIBC 311 04/25/2022    TIBC 340 11/30/2021    LABIRON 13 (L) 08/29/2022    LABIRON 15 (L) 04/25/2022    LABIRON 22 11/30/2021    FERRITIN 97.78 08/29/2022    FERRITIN 156.00 (H) 04/25/2022    FERRITIN 212.00 (H) 11/30/2021    LNBVVJKC09 516 08/29/2022    ZRRDCBTF42  >2,000 (H) 2022    JCHFMPFI27 1,495 (H) 2022    FOLATE >20.00 2022    FOLATE >20.00 2022    FOLATE >20.00 2022     Lab Results   Component Value Date    REFLABREPO  06/10/2022     [facility]  [address] [place] [address]  Phone: [phone] or [phone]  Fax: [phone]  Landen Baker M.D., Medical Director    PATIENT NAME                                     LABORATORY NO.  [id number]   CHIQUITA BAILEY.                              [id number]  [id number]                                 AGE                    SEX   SSN              CLIENT REF #  [facility]                   62        1960      F     [id number]      [id number]    [place]                               REQUESTING M.D.           ATTENDING MALMA.         COPY TO.  [address] [place] [address]  DATE COLLECTED            DATE RECEIVED          DATE REPORTED  06/10/2022                06/10/2022             2022    DIAGNOSIS:  A.     GASTRIC ANTRUM, BIOPSY:  Gastric fundic type mucosa with mild chronic inactive gastritis  Negative for intestinal metaplasia or dysplasia  No Helicobacter pylori-like organisms seen  B.     GE JUNCTION, ULCER:  Squamous mucosa with features suggestive of reflux esophagitis  Negative for glandular type mucosa, intestinal metaplasia, or dysplasia  Negative for specific microorganisms    DELIA    CLINICAL HISTORY:  Chest pain, unspecified type    SPECIMENS RECEIVED:  A.    GASTRIC ANTRUM, BIOPSY  B.    GE JUNCTION, ULCER    MICROSCOPIC DESCRIPTION:  Tissue blocks are prepared and slides are examined microscopically on all  specimens. See diagnosis for details.    Professional interpretation rendered by Ben Barahona M.D., F.C.A.P. at [facility], [place], [address] [place] [address].    GROSS DESCRIPTION:  A.     "Specimen is received in 1 formalin filled container labeled \"gastric antrum\"  and consists of 2 portions of tan soft tissue measuring 0.4 x 0.2 x 0.2 cm.  The specimen is submitted entirely in 1 cassette.  BW  B.    Specimen is received in 1 formalin filled container labeled \"ulcer EG  junction\" and consists of a single portion of tan soft tissue measuring 0.4 x  0.3 x 0.2 cm.  The specimen is submitted entirely in 1 cassette.    REVIEWED, DIAGNOSED AND ELECTRONICALLY  SIGNED BY:    Ben Barahona M.D., BARBARA  CPT CODES:  88305x2                 RADIOLOGY DATA :  No radiology results for the last 7 days        Assessment & Plan   1.  Thrombocytosis:  - Patient has intermittent thrombocytosis since 2015.  -Labs reviewed today and platelet count is normal at 440,000.  -Extensive work-up including Tony 2 mutation with exon 12, CALR mutation, MPL mutation done on September 30, 2019 is negative.  - At this point will continue with clinical monitoring.  Will ask patient to return to clinic in 4 months with repeat CBC and anemia work-up to be done     2.  Leukocytosis:  - Patient has persistent leukocytosis since 2015.  -White blood cell count is 13,000 with predominant neutrophils of differential..  CALR mutation, MPL mutation, Tony 2 mutation including exon 12 were negative.  At this point will continue with clinical monitoring.  - If patient has any worsening leukocytosis or thrombocytosis in future, she will need bone marrow biopsy which was discussed with patient.     3.  Iron deficiency from possible GI blood loss; received multiple doses of IV iron in past; last dose was in 2020.  -labs reviewed today; Hgb is 13.2 with stable iron stores.  -continue to monitor iron stores and repeat labs again in 4 mos.                 PHQ-9 Total Score: 0     Ariana Martinez reports a pain score of 5.  Given her pain assessment as noted, treatment options were discussed and the following options were decided upon as a follow-up " plan to address the patient's pain: continuation of current treatment plan for pain.         Teressa Hammond, BEBE  8/30/2022  12:49 CDT        Part of this note may be an electronic transcription/translation of spoken language to printed text using the Dragon Dictation System.          CC:

## 2022-08-30 NOTE — PROGRESS NOTES
Chief Complaint   Patient presents with   • Heartburn       Subjective    Ariana Martinez is a 62 y.o. female. she is here today for follow-up.    62-year-old female presents for recheck regarding reflux and gastroparesis.  States overall she has done very well she had flareup of symptoms in June 2022 was hospitalized in states symptoms have been well controlled since that time.  She takes Protonix daily and takes Reglan only as needed.  Reports her bowel movements have been regular denies any melena or hematochezia she is due for screening colonoscopy June 2023 for surveillance.    Heartburn  She complains of nausea. She reports no abdominal pain, no belching, no chest pain, no choking, no coughing, no dysphagia, no early satiety, no globus sensation, no heartburn, no hoarse voice or no sore throat. This is a chronic problem. The current episode started more than 1 year ago. The problem has been unchanged. Pertinent negatives include no fatigue. She has tried a PPI for the symptoms. Past procedures include an EGD.            The following portions of the patient's history were reviewed and updated as appropriate:   Past Medical History:   Diagnosis Date   • Acute bacterial endocarditis 3/13/2021   • Angina, class IV ([facility])    • Anxiety    • Anxiety and depression    • Arthritis    • Benign paroxysmal positional vertigo    • Bladder disorder     has bladder stimulator   • Carpal tunnel syndrome    • CHF (congestive heart failure) ([facility])    • Chronic pain    • Coronary atherosclerosis     hx CABG 2005.  is followed by Dr Kwon   • Depression    • Diabetes mellitus ([facility])     Type 2, controlled   • Diabetic polyneuropathy ([facility])    • Disease of thyroid gland    • Elevated cholesterol    • Female stress incontinence    • Foot pain, left    • Full dentures    • Gastroparesis    • GERD (gastroesophageal reflux disease)    • Hyperlipidemia    • Hypertension    • Low back pain    • Malaise and fatigue    • Multiple joint  pain    • Obesity     Refuses to be weighed   • Occlusion and stenosis of bilateral carotid arteries 6/18/2021   • Otalgia     Both   • Perforation of tympanic membrane     Left   • Postoperative wound infection    • Vitamin D deficiency    • Wears glasses     reading     Past Surgical History:   Procedure Laterality Date   • ABDOMINAL SURGERY     • AMPUTATION FOOT     • ANGIOPLASTY      coronary   • ANKLE ARTHROSCOPY Left 2/26/2021    Procedure: Left foot hardware removal, ankle arthroscopy, bone grafting , left foot exostectomy;  Surgeon: Ignacio Lord DPM;  Location: [facility] OR;  Service: Podiatry;  Laterality: Left;   • BREAST BIOPSY Right    • CARDIAC CATHETERIZATION     • CARDIAC CATHETERIZATION N/A 6/20/2017    Procedure: Right Heart Cath;  Surgeon: Can Kwon MD PhD;  Location: [facility] CATH INVASIVE LOCATION;  Service:    • CARDIAC CATHETERIZATION N/A 2/18/2020    Procedure: Left Heart Cath;  Surgeon: Catalina Cooper MD;  Location: [facility] CATH INVASIVE LOCATION;  Service: Cardiology;  Laterality: N/A;   • CARDIAC CATHETERIZATION N/A 4/6/2022    Procedure: Left Heart Cath;  Surgeon: Sheryl Navas MD;  Location: [facility] CATH INVASIVE LOCATION;  Service: Cardiology;  Laterality: N/A;   • CARPAL TUNNEL RELEASE     • CHOLECYSTECTOMY     • COLONOSCOPY N/A 6/24/2020    Procedure: COLONOSCOPY;  Surgeon: Julián Maldonado MD;  Location: [facility] ENDOSCOPY;  Service: Gastroenterology;  Laterality: N/A;   • CORONARY ARTERY BYPASS GRAFT  2005   • ENDOSCOPY N/A 10/19/2018    Procedure: ESOPHAGOGASTRODUODENOSCOPY possible dilation;  Surgeon: Julián Maldonado MD;  Location: [facility] ENDOSCOPY;  Service: Gastroenterology   • ENDOSCOPY N/A 6/24/2020    Procedure: ESOPHAGOGASTRODUODENOSCOPY WED appt please;  Surgeon: Julián Maldonado MD;  Location: [facility] ENDOSCOPY;  Service: Gastroenterology;  Laterality: N/A;   • ENDOSCOPY N/A 6/10/2022    Procedure: ESOPHAGOGASTRODUODENOSCOPY   room 380;  Surgeon: Franky  MD Jeremiah;  Location: [facility] ENDOSCOPY;  Service: Gastroenterology;  Laterality: N/A;   • ENDOSCOPY AND COLONOSCOPY     • FOOT SURGERY      Toes   • FOOT SURGERY     • GASTRIC BANDING      Revision, laparoscopic   • HYSTERECTOMY     • INCISION AND DRAINAGE LEG Left 3/12/2021    Procedure: Left ankle arthroscopic irrigation and debridement, screw removal;  Surgeon: Ignacio Lord DPM;  Location: [facility] OR;  Service: Podiatry;  Laterality: Left;   • MOUTH SURGERY     • SALPINGO OOPHORECTOMY     • SHOULDER SURGERY     • SUBTALAR ARTHRODESIS Left 2019    Procedure: LEFT FOOT HARDWARE REMOVAL, FIFTH METATARSAL , OPEN REDUCTION INTERNAL FIXATION, CALCANEAL OSTEOTOMY;  Surgeon: Ignacio Lord DPM;  Location: [facility] OR;  Service: Podiatry   • SUBTALAR ARTHRODESIS Left 10/16/2019    Procedure: foot hardware removal, subtalar joint fusion  possible de/reattachment of achilles tendon        (c-arm);  Surgeon: Ignacio Lord DPM;  Location: [facility] OR;  Service: Podiatry   • SUBTALAR ARTHRODESIS Left 2020    Procedure: subtalar, talonavicular joint arthrodesis.  Removal hardware.          (c-arm);  Surgeon: Ignacio Lord DPM;  Location: [facility] OR;  Service: Podiatry;  Laterality: Left;   • TRANSESOPHAGEAL ECHOCARDIOGRAM (LAMONTE)      With color flow     Family History   Problem Relation Age of Onset   • Diabetes Other    • Heart disease Other    • Hypertension Other    • Heart disease Mother    • Stroke Mother    • Hypertension Mother    • Diabetes Sister    • Heart disease Sister    • Hypertension Sister    • Heart disease Sister    • Diabetes Sister    • Hypertension Sister    • Diabetes Sister    • Diabetes Sister    • Diabetes Sister    • Diabetes Sister      OB History        0    Para   0    Term   0       0    AB   0    Living   0       SAB   0    IAB   0    Ectopic   0    Molar        Multiple   0    Live Births                  Prior to Admission medications    Medication  Sig Start Date End Date Taking? Authorizing Provider   amLODIPine (NORVASC) 5 MG tablet Take 1 tablet by mouth Daily. 6/27/22  Yes Kimberly Ferguson APRN   ARIPiprazole (ABILIFY) 5 MG tablet Take 1 tablet by mouth Daily. 6/27/22  Yes Kimberly Ferguson APRN   aspirin  MG tablet Take 1 tablet by mouth Daily.  Patient taking differently: Take 81 mg by mouth Daily. 2/8/22  Yes Mahin Esteves MD   atorvastatin (LIPITOR) 80 MG tablet TAKE 1 TABLET BY MOUTH EVERY DAY 8/24/22  Yes Ethel Landaverde PA-C   BD SHARPS CONTAINER HOME misc 1 each Take As Directed. 9/7/18  Yes Francisca Fong MD   benzonatate (Tessalon Perles) 100 MG capsule Take 1 capsule by mouth 3 (Three) Times a Day As Needed for Cough. 8/4/22  Yes Ethel Landaverde PA-C   Blood Glucose Monitoring Suppl (ONE TOUCH ULTRA MINI) w/Device kit Patient will need the Accu-Check Avitio meter 10/18/21  Yes Kimberly Ferguson APRN   Calcium Citrate-Vitamin D 250-200 MG-UNIT tablet Take 2 tablets by mouth 2 (two) times a day.   Yes Provider, MD Esther   clopidogrel (PLAVIX) 75 MG tablet Take 1 tablet by mouth Daily. 8/5/22  Yes Ethel Landaverde PA-C   cyanocobalamin 1000 MCG/ML injection INJECT 1 ML INTO THE APPROPRIATE MUSCLE AS DIRECTED BY PRESCRIBER EVERY 28 (TWENTY-EIGHT) DAYS. 4/13/22  Yes Kimberly Ferguson APRN   folic acid (FOLVITE) 1 MG tablet TAKE 1 TABLET BY MOUTH EVERY DAY 8/22/22  Yes Teressa Hammond APRN   furosemide (LASIX) 40 MG tablet Take 1 tablet by mouth Daily. 7/8/22  Yes Suzanne Valerio MD   gabapentin (NEURONTIN) 100 MG capsule Take 1 capsule by mouth Every 12 (Twelve) Hours. 6/27/22  Yes Kimberly Ferguson APRN   glucose blood (Accu-Chek Guide) test strip 1 each by Other route 4 (Four) Times a Day. To test blood sugar 4X daily. For  Dx E11.65 5/2/22  Yes Kimberly Ferguson APRN   glucose monitor monitoring kit 1 each Daily. accucheck eve meter, E11.9 6/11/20  Yes Elvis Gamboa APRN    HYDROcodone-acetaminophen (NORCO) 7.5-325 MG per tablet Take 1 tablet by mouth Every 6 (Six) Hours As Needed for Moderate Pain . 8/18/22  Yes Geri Baig MD   insulin aspart (NovoLOG FlexPen) 100 UNIT/ML solution pen-injector sc pen INJECT 0 TO 14 UNITS UNDER THE SKIN 3 TIMES A DAY BEFORE MEALS ACCORDING TO SLIDING SCALE  Patient taking differently: 30 Units 3 (Three) Times a Day With Meals. 4/21/22  Yes Kimberly Ferguson APRN   Insulin Glargine (BASAGLAR KWIKPEN) 100 UNIT/ML injection pen INJECT 24 UNITS SUBCUTANEOUSLY AT BEDTIME 5/5/22  Yes Kimberly Ferguson APRN   Insulin Pen Needle (B-D ULTRAFINE III SHORT PEN) 31G X 8 MM misc USE TO INJECT 5 TIMES A DAY 8/26/21  Yes Elvis Gamboa APRN   Insulin Pen Needle 31G X 8 MM misc Use up to 4 (four) times daily with insulin as directed. 7/12/21  Yes Brandon Martel MD   isosorbide mononitrate (IMDUR) 30 MG 24 hr tablet Take 1 tablet by mouth Daily. 8/29/22  Yes Ethel Landaverde PA-C   Lancets (accu-chek soft touch) lancets As directed 10/18/21  Yes Kimberly Ferguson APRN   meclizine (ANTIVERT) 25 MG tablet Take 1 tablet by mouth 3 (Three) Times a Day As Needed for dizziness. 12/14/17  Yes Evon Hernandez APRN   melatonin 3 MG tablet Take 2 tablets by mouth Every Night. May take 1 if this works, or may take 2 as single dose if more effective. 6/27/22  Yes Kimberly Ferguson APRN   meloxicam (MOBIC) 15 MG tablet 1 PO Daily with food. 8/18/22  Yes Rohan Lazo MD   methocarbamol (ROBAXIN) 500 MG tablet TAKE 1 TABLET BY MOUTH 2 TIMES A DAY AS NEEDED FOR MUSCLE SPASMS. 6/7/22  Yes Kimberly Ferguson APRN   metoclopramide (REGLAN) 10 MG tablet Take 1 tablet by mouth 4 (Four) Times a Day As Needed (nausea). 6/27/22  Yes Kimberly Ferguson APRN   metoprolol succinate XL (TOPROL-XL) 50 MG 24 hr tablet TAKE 1 TABLET BY MOUTH DAILY. DOSE INCREASED 5/24/21 4/26/22  Yes Kimberly Ferguson APRN   montelukast (SINGULAIR) 10 MG tablet TAKE 1  "TABLET BY MOUTH EVERY NIGHT. TO HELP WITH ALLERGIES CAUSING RUNNY NOSE 8/24/22  Yes Ethel Landaverde PA-C   naloxone (NARCAN) 0.4 MG/ML injection Infuse 0.5 mL into a venous catheter Every 5 (Five) Minutes As Needed for Opioid Reversal. 3/13/21  Yes Nick Faye MD   Needle, Disp, (Easy Touch FlipLock Needles) 25G X 1-1/2\" misc 1 each Every 28 (Twenty-Eight) Days. For administering B12 cyanocobalomin. Dx vitamin B12 deficiency. 5/24/22  Yes Kimberly Ferguson APRN   Needle, Disp, (Hypodermic Needle) 20G X 1\" misc 1 each Every 28 (Twenty-Eight) Days. For drawing up B12 for injection monthly, change back to smaller gauge needle prior to injection. Dx Vitamin B12  Deficiency. 5/24/22  Yes Kimberly Ferguson APRN   nitroglycerin (NITROSTAT) 0.4 MG SL tablet Place 1 tablet under the tongue Every 5 (Five) Minutes As Needed for Chest Pain. Take no more than 3 doses in 15 minutes. 4/26/22  Yes Kimberly Ferguson APRN   NovoLOG 100 UNIT/ML injection INJECT 8 UNITS SUBCUTANEOUSLY 3 TIMES DAILY WITH MEALS.  Patient taking differently: Flex pen already charted 3/25/22  Yes Elvis Gamboa APRN   ondansetron (ZOFRAN) 8 MG tablet TAKE 1 TABLET BY MOUTH EVERY 8 (EIGHT) HOURS AS NEEDED FOR NAUSEA OR VOMITING. 12/17/21  Yes Kimberly Ferguson APRN   pantoprazole (PROTONIX) 20 MG EC tablet Take 2 tablets by mouth Daily. 7/12/22  Yes Kimberly Ferguson APRN   ranolazine (RANEXA) 500 MG 12 hr tablet Take 1 tablet by mouth Every 12 (Twelve) Hours. 6/29/22  Yes Bill Gibson MD   sucralfate (CARAFATE) 1 g tablet Take 1 tablet by mouth 4 (Four) Times a Day Before Meals & at Bedtime. 7/7/22  Yes Suzanne Valerio MD   Syringe 20G X 1-1/2\" 3 ML misc 1 each Every 28 (Twenty-Eight) Days. For injection cyanocobalomin, Dx vit B12 deficiency. 5/24/22  Yes Kimberly Ferguson APRN   venlafaxine XR (EFFEXOR-XR) 75 MG 24 hr capsule Take 1 capsule by mouth Daily With Breakfast. 6/27/22  Yes Kimberly Ferguson, " "BEBE   vitamin D (ERGOCALCIFEROL) 1.25 MG (61521 UT) capsule capsule Take 1 capsule by mouth Every 7 (Seven) Days. 6/27/22  Yes Ferguson, Kimberly AudreyEBBE sanches     Allergies   Allergen Reactions   • Seroquel [Quetiapine] Anaphylaxis   • Avandia [Rosiglitazone] Swelling   • Morphine And Related Hallucinations   • Oxycodone Hallucinations     Social History     Socioeconomic History   • Marital status:    Tobacco Use   • Smoking status: Never Smoker   • Smokeless tobacco: Never Used   Vaping Use   • Vaping Use: Never used   Substance and Sexual Activity   • Alcohol use: No   • Drug use: No   • Sexual activity: Defer       Review of Systems  Review of Systems   Constitutional: Negative for activity change, appetite change, chills, diaphoresis, fatigue, fever and unexpected weight change.   HENT: Negative for hoarse voice, sore throat and trouble swallowing.    Respiratory: Negative for cough, choking and shortness of breath.    Cardiovascular: Negative for chest pain.   Gastrointestinal: Positive for nausea. Negative for abdominal distention, abdominal pain, anal bleeding, blood in stool, constipation, diarrhea, dysphagia, heartburn, rectal pain and vomiting.   Musculoskeletal: Negative for arthralgias.   Skin: Negative for pallor.   Neurological: Negative for light-headedness.        /61 (BP Location: Left arm)   Pulse 83   Ht 172.7 cm (68\")   Wt 116 kg (256 lb 3.2 oz)   BMI 38.96 kg/m²     Objective    Physical Exam  Constitutional:       General: She is not in acute distress.     Appearance: Normal appearance. She is normal weight. She is not ill-appearing.   HENT:      Head: Normocephalic and atraumatic.   Pulmonary:      Effort: Pulmonary effort is normal.   Abdominal:      General: Abdomen is flat. Bowel sounds are normal. There is no distension.      Palpations: Abdomen is soft. There is no mass.      Tenderness: There is no abdominal tenderness.   Neurological:      Mental Status: She is alert. "       Lab on 08/29/2022   Component Date Value Ref Range Status   • Ferritin 08/29/2022 97.78  13.00 - 150.00 ng/mL Final   • Iron 08/29/2022 51  37 - 145 mcg/dL Final   • Iron Saturation 08/29/2022 13 (A) 20 - 50 % Final   • Transferrin 08/29/2022 266  200 - 360 mg/dL Final   • TIBC 08/29/2022 396  298 - 536 mcg/dL Final   • Folate 08/29/2022 >20.00  4.78 - 24.20 ng/mL Final   • Vitamin B-12 08/29/2022 516  211 - 946 pg/mL Final   • WBC 08/29/2022 13.83 (A) 3.40 - 10.80 10*3/mm3 Final   • RBC 08/29/2022 4.58  3.77 - 5.28 10*6/mm3 Final   • Hemoglobin 08/29/2022 13.2  12.0 - 15.9 g/dL Final   • Hematocrit 08/29/2022 39.0  34.0 - 46.6 % Final   • MCV 08/29/2022 85.2  79.0 - 97.0 fL Final   • MCH 08/29/2022 28.8  26.6 - 33.0 pg Final   • MCHC 08/29/2022 33.8  31.5 - 35.7 g/dL Final   • RDW 08/29/2022 14.5  12.3 - 15.4 % Final   • RDW-SD 08/29/2022 44.4  37.0 - 54.0 fl Final   • MPV 08/29/2022 9.7  6.0 - 12.0 fL Final   • Platelets 08/29/2022 440  140 - 450 10*3/mm3 Final   • Neutrophil % 08/29/2022 75.4  42.7 - 76.0 % Final   • Lymphocyte % 08/29/2022 14.8 (A) 19.6 - 45.3 % Final   • Monocyte % 08/29/2022 5.4  5.0 - 12.0 % Final   • Eosinophil % 08/29/2022 3.5  0.3 - 6.2 % Final   • Basophil % 08/29/2022 0.4  0.0 - 1.5 % Final   • Immature Grans % 08/29/2022 0.5  0.0 - 0.5 % Final   • Neutrophils, Absolute 08/29/2022 10.43 (A) 1.70 - 7.00 10*3/mm3 Final   • Lymphocytes, Absolute 08/29/2022 2.05  0.70 - 3.10 10*3/mm3 Final   • Monocytes, Absolute 08/29/2022 0.75  0.10 - 0.90 10*3/mm3 Final   • Eosinophils, Absolute 08/29/2022 0.48 (A) 0.00 - 0.40 10*3/mm3 Final   • Basophils, Absolute 08/29/2022 0.05  0.00 - 0.20 10*3/mm3 Final   • Immature Grans, Absolute 08/29/2022 0.07 (A) 0.00 - 0.05 10*3/mm3 Final   • nRBC 08/29/2022 0.0  0.0 - 0.2 /100 WBC Final     Assessment & Plan      1. Gastroesophageal reflux disease with esophagitis without hemorrhage    2. Gastroparesis    .   Continue Protonix daily avoid gastric  irritants follow standard antireflux measures.  Recommend weight loss.  Continue small frequent meals for gastroparesis avoid overeating continue with Reglan as needed discussed risk and benefit of medication patient would like to continue it.  Follow-up in 8 months for recheck screening colonoscopy due June 23 for surveillance  Orders placed during this encounter include:  No orders of the defined types were placed in this encounter.      * Surgery not found *    Review and/or summary of lab tests, radiology, procedures, medications. Review and summary of old records and obtaining of history. The risks and benefits of my recommendations, as well as other treatment options were discussed with the patient today. Questions were answered.    No orders of the defined types were placed in this encounter.      Follow-up: Return in about 8 months (around 4/30/2023) for Recheck.          This document has been electronically signed by BEBE Leach on August 30, 2022 17:48 CDT           I spent 19 minutes caring for Ariana on this date of service. This time includes time spent by me in the following activities:preparing for the visit, reviewing tests, obtaining and/or reviewing a separately obtained history, performing a medically appropriate examination and/or evaluation , counseling and educating the patient/family/caregiver, ordering medications, tests, or procedures, referring and communicating with other health care professionals , documenting information in the medical record and care coordination    Results for orders placed or performed in visit on 08/29/22   Vitamin B12 & Folate    Specimen: Blood   Result Value Ref Range    Folate >20.00 4.78 - 24.20 ng/mL    Vitamin B-12 516 211 - 946 pg/mL   CBC Auto Differential    Specimen: Blood   Result Value Ref Range    WBC 13.83 (H) 3.40 - 10.80 10*3/mm3    RBC 4.58 3.77 - 5.28 10*6/mm3    Hemoglobin 13.2 12.0 - 15.9 g/dL    Hematocrit 39.0 34.0 - 46.6 %    MCV 85.2  79.0 - 97.0 fL    MCH 28.8 26.6 - 33.0 pg    MCHC 33.8 31.5 - 35.7 g/dL    RDW 14.5 12.3 - 15.4 %    RDW-SD 44.4 37.0 - 54.0 fl    MPV 9.7 6.0 - 12.0 fL    Platelets 440 140 - 450 10*3/mm3    Neutrophil % 75.4 42.7 - 76.0 %    Lymphocyte % 14.8 (L) 19.6 - 45.3 %    Monocyte % 5.4 5.0 - 12.0 %    Eosinophil % 3.5 0.3 - 6.2 %    Basophil % 0.4 0.0 - 1.5 %    Immature Grans % 0.5 0.0 - 0.5 %    Neutrophils, Absolute 10.43 (H) 1.70 - 7.00 10*3/mm3    Lymphocytes, Absolute 2.05 0.70 - 3.10 10*3/mm3    Monocytes, Absolute 0.75 0.10 - 0.90 10*3/mm3    Eosinophils, Absolute 0.48 (H) 0.00 - 0.40 10*3/mm3    Basophils, Absolute 0.05 0.00 - 0.20 10*3/mm3    Immature Grans, Absolute 0.07 (H) 0.00 - 0.05 10*3/mm3    nRBC 0.0 0.0 - 0.2 /100 WBC   Iron Profile    Specimen: Blood   Result Value Ref Range    Iron 51 37 - 145 mcg/dL    Iron Saturation 13 (L) 20 - 50 %    Transferrin 266 200 - 360 mg/dL    TIBC 396 298 - 536 mcg/dL   Ferritin    Specimen: Blood   Result Value Ref Range    Ferritin 97.78 13.00 - 150.00 ng/mL   Results for orders placed or performed during the hospital encounter of 07/04/22   Green Top (Gel)   Result Value Ref Range    Extra Tube Hold for add-ons.    COVID-19 and FLU A/B PCR - Swab, Nasopharynx    Specimen: Nasopharynx; Swab   Result Value Ref Range    COVID19 Not Detected Not Detected - Ref. Range    Influenza A PCR Not Detected Not Detected    Influenza B PCR Not Detected Not Detected   CBC Auto Differential    Specimen: Blood   Result Value Ref Range    WBC 9.05 3.40 - 10.80 10*3/mm3    RBC 4.13 3.77 - 5.28 10*6/mm3    Hemoglobin 12.0 12.0 - 15.9 g/dL    Hematocrit 36.0 34.0 - 46.6 %    MCV 87.2 79.0 - 97.0 fL    MCH 29.1 26.6 - 33.0 pg    MCHC 33.3 31.5 - 35.7 g/dL    RDW 13.2 12.3 - 15.4 %    RDW-SD 41.7 37.0 - 54.0 fl    MPV 9.7 6.0 - 12.0 fL    Platelets 441 140 - 450 10*3/mm3    Neutrophil % 63.0 42.7 - 76.0 %    Lymphocyte % 24.5 19.6 - 45.3 %    Monocyte % 7.0 5.0 - 12.0 %    Eosinophil % 4.9  0.3 - 6.2 %    Basophil % 0.4 0.0 - 1.5 %    Immature Grans % 0.2 0.0 - 0.5 %    Neutrophils, Absolute 5.70 1.70 - 7.00 10*3/mm3    Lymphocytes, Absolute 2.22 0.70 - 3.10 10*3/mm3    Monocytes, Absolute 0.63 0.10 - 0.90 10*3/mm3    Eosinophils, Absolute 0.44 (H) 0.00 - 0.40 10*3/mm3    Basophils, Absolute 0.04 0.00 - 0.20 10*3/mm3    Immature Grans, Absolute 0.02 0.00 - 0.05 10*3/mm3    nRBC 0.0 0.0 - 0.2 /100 WBC   CBC Auto Differential    Specimen: Blood   Result Value Ref Range    WBC 12.50 (H) 3.40 - 10.80 10*3/mm3    RBC 3.91 3.77 - 5.28 10*6/mm3    Hemoglobin 11.7 (L) 12.0 - 15.9 g/dL    Hematocrit 34.1 34.0 - 46.6 %    MCV 87.2 79.0 - 97.0 fL    MCH 29.9 26.6 - 33.0 pg    MCHC 34.3 31.5 - 35.7 g/dL    RDW 13.5 12.3 - 15.4 %    RDW-SD 41.8 37.0 - 54.0 fl    MPV 9.4 6.0 - 12.0 fL    Platelets 427 140 - 450 10*3/mm3    Neutrophil % 62.8 42.7 - 76.0 %    Lymphocyte % 24.2 19.6 - 45.3 %    Monocyte % 8.4 5.0 - 12.0 %    Eosinophil % 3.6 0.3 - 6.2 %    Basophil % 0.5 0.0 - 1.5 %    Immature Grans % 0.5 0.0 - 0.5 %    Neutrophils, Absolute 7.85 (H) 1.70 - 7.00 10*3/mm3    Lymphocytes, Absolute 3.03 0.70 - 3.10 10*3/mm3    Monocytes, Absolute 1.05 (H) 0.10 - 0.90 10*3/mm3    Eosinophils, Absolute 0.45 (H) 0.00 - 0.40 10*3/mm3    Basophils, Absolute 0.06 0.00 - 0.20 10*3/mm3    Immature Grans, Absolute 0.06 (H) 0.00 - 0.05 10*3/mm3    nRBC 0.0 0.0 - 0.2 /100 WBC   Lavender Top   Result Value Ref Range    Extra Tube hold for add-on    Troponin    Specimen: Blood   Result Value Ref Range    Troponin T <0.010 0.000 - 0.030 ng/mL   Troponin    Specimen: Blood   Result Value Ref Range    Troponin T <0.010 0.000 - 0.030 ng/mL   Troponin    Specimen: Blood   Result Value Ref Range    Troponin T <0.010 0.000 - 0.030 ng/mL   POC Glucose Once    Specimen: Blood   Result Value Ref Range    Glucose 290 (H) 70 - 130 mg/dL   POC Glucose Once    Specimen: Blood   Result Value Ref Range    Glucose 284 (H) 70 - 130 mg/dL   POC Glucose  Once    Specimen: Blood   Result Value Ref Range    Glucose 250 (H) 70 - 130 mg/dL   POC Glucose Once    Specimen: Blood   Result Value Ref Range    Glucose 216 (H) 70 - 130 mg/dL   POC Glucose Once    Specimen: Blood   Result Value Ref Range    Glucose 284 (H) 70 - 130 mg/dL   POC Glucose Once    Specimen: Blood   Result Value Ref Range    Glucose 223 (H) 70 - 130 mg/dL   POC Glucose Once    Specimen: Blood   Result Value Ref Range    Glucose 332 (H) 70 - 130 mg/dL   POC Glucose Once    Specimen: Blood   Result Value Ref Range    Glucose 225 (H) 70 - 130 mg/dL   POC Glucose Once    Specimen: Blood   Result Value Ref Range    Glucose 247 (H) 70 - 130 mg/dL   POC Glucose Once    Specimen: Blood   Result Value Ref Range    Glucose 202 (H) 70 - 130 mg/dL   POC Glucose Once    Specimen: Blood   Result Value Ref Range    Glucose 291 (H) 70 - 130 mg/dL   Potassium    Specimen: Blood   Result Value Ref Range    Potassium 4.3 3.5 - 5.2 mmol/L   BNP    Specimen: Blood   Result Value Ref Range    proBNP 1,273.0 (H) 0.0 - 900.0 pg/mL     *Note: Due to a large number of results and/or encounters for the requested time period, some results have not been displayed. A complete set of results can be found in Results Review.

## 2022-09-01 ENCOUNTER — TELEPHONE (OUTPATIENT)
Dept: ORTHOPEDIC SURGERY | Facility: CLINIC | Age: 62
End: 2022-09-01

## 2022-09-01 DIAGNOSIS — M25.511 ACUTE PAIN OF RIGHT SHOULDER: Primary | ICD-10-CM

## 2022-09-01 NOTE — TELEPHONE ENCOUNTER
New order placed for CT Arthrogram and Linda informed that, the wrong order was placed, the first time. New order has been approved and Linda notified of this, also.

## 2022-09-06 RX ORDER — ISOSORBIDE MONONITRATE 30 MG/1
TABLET, EXTENDED RELEASE ORAL
Qty: 30 TABLET | Refills: 0 | Status: SHIPPED | OUTPATIENT
Start: 2022-09-06 | End: 2022-12-21

## 2022-09-07 ENCOUNTER — OFFICE VISIT (OUTPATIENT)
Dept: FAMILY MEDICINE CLINIC | Facility: CLINIC | Age: 62
End: 2022-09-07

## 2022-09-07 VITALS
BODY MASS INDEX: 39.71 KG/M2 | HEIGHT: 68 IN | SYSTOLIC BLOOD PRESSURE: 118 MMHG | TEMPERATURE: 98 F | DIASTOLIC BLOOD PRESSURE: 80 MMHG | HEART RATE: 90 BPM | OXYGEN SATURATION: 98 % | WEIGHT: 262 LBS

## 2022-09-07 DIAGNOSIS — E78.2 MIXED HYPERLIPIDEMIA: Chronic | ICD-10-CM

## 2022-09-07 DIAGNOSIS — E11.43 TYPE 2 DIABETES MELLITUS WITH DIABETIC AUTONOMIC NEUROPATHY, WITH LONG-TERM CURRENT USE OF INSULIN: Chronic | ICD-10-CM

## 2022-09-07 DIAGNOSIS — Z76.89 ENCOUNTER TO ESTABLISH CARE: Primary | ICD-10-CM

## 2022-09-07 DIAGNOSIS — M54.41 CHRONIC RIGHT-SIDED LOW BACK PAIN WITH RIGHT-SIDED SCIATICA: ICD-10-CM

## 2022-09-07 DIAGNOSIS — G89.29 CHRONIC RIGHT-SIDED LOW BACK PAIN WITH RIGHT-SIDED SCIATICA: ICD-10-CM

## 2022-09-07 DIAGNOSIS — Z79.4 TYPE 2 DIABETES MELLITUS WITH DIABETIC AUTONOMIC NEUROPATHY, WITH LONG-TERM CURRENT USE OF INSULIN: Chronic | ICD-10-CM

## 2022-09-07 DIAGNOSIS — G62.9 NEUROPATHY: Chronic | ICD-10-CM

## 2022-09-07 DIAGNOSIS — G54.6 PHANTOM PAIN AFTER AMPUTATION OF LOWER EXTREMITY: Chronic | ICD-10-CM

## 2022-09-07 PROCEDURE — 99214 OFFICE O/P EST MOD 30 MIN: CPT | Performed by: NURSE PRACTITIONER

## 2022-09-07 RX ORDER — FUROSEMIDE 40 MG/1
40 TABLET ORAL DAILY
Qty: 30 TABLET | Refills: 0 | Status: SHIPPED | OUTPATIENT
Start: 2022-09-07 | End: 2022-10-03

## 2022-09-07 RX ORDER — ONDANSETRON HYDROCHLORIDE 8 MG/1
8 TABLET, FILM COATED ORAL EVERY 8 HOURS PRN
Qty: 18 TABLET | Refills: 1 | Status: SHIPPED | OUTPATIENT
Start: 2022-09-07 | End: 2022-12-07 | Stop reason: SDUPTHER

## 2022-09-07 RX ORDER — HYDROCODONE BITARTRATE AND ACETAMINOPHEN 7.5; 325 MG/1; MG/1
1 TABLET ORAL EVERY 6 HOURS PRN
Qty: 120 TABLET | Refills: 0 | Status: SHIPPED | OUTPATIENT
Start: 2022-09-07 | End: 2022-10-14 | Stop reason: SDUPTHER

## 2022-09-07 RX ORDER — ATORVASTATIN CALCIUM 80 MG/1
80 TABLET, FILM COATED ORAL DAILY
Qty: 90 TABLET | Refills: 1 | Status: SHIPPED | OUTPATIENT
Start: 2022-09-07

## 2022-09-07 RX ORDER — HYDROCHLOROTHIAZIDE 12.5 MG/1
12.5 TABLET ORAL DAILY
Qty: 30 EACH | Refills: 0 | Status: SHIPPED | OUTPATIENT
Start: 2022-09-07 | End: 2022-09-30

## 2022-09-07 RX ORDER — HYDROCHLOROTHIAZIDE 12.5 MG/1
12.5 TABLET ORAL DAILY
COMMUNITY
End: 2022-09-07 | Stop reason: SDUPTHER

## 2022-09-07 RX ORDER — GABAPENTIN 100 MG/1
100 CAPSULE ORAL EVERY 12 HOURS SCHEDULED
Qty: 60 CAPSULE | Refills: 0 | Status: SHIPPED | OUTPATIENT
Start: 2022-09-07 | End: 2022-10-21

## 2022-09-09 RX ORDER — FLUTICASONE PROPIONATE 50 MCG
2 SPRAY, SUSPENSION (ML) NASAL DAILY
Qty: 9.9 ML | Refills: 0 | Status: SHIPPED | OUTPATIENT
Start: 2022-09-09 | End: 2022-10-03

## 2022-09-20 ENCOUNTER — HOSPITAL ENCOUNTER (OUTPATIENT)
Dept: INTERVENTIONAL RADIOLOGY/VASCULAR | Facility: HOSPITAL | Age: 62
Discharge: HOME | End: 2022-09-20

## 2022-09-20 ENCOUNTER — HOSPITAL ENCOUNTER (OUTPATIENT)
Dept: CT IMAGING | Facility: HOSPITAL | Age: 62
Discharge: HOME | End: 2022-09-20

## 2022-09-20 VITALS
HEART RATE: 78 BPM | DIASTOLIC BLOOD PRESSURE: 75 MMHG | RESPIRATION RATE: 20 BRPM | OXYGEN SATURATION: 98 % | SYSTOLIC BLOOD PRESSURE: 165 MMHG

## 2022-09-20 DIAGNOSIS — M25.511 ACUTE PAIN OF RIGHT SHOULDER: ICD-10-CM

## 2022-09-20 PROCEDURE — 77002 NEEDLE LOCALIZATION BY XRAY: CPT

## 2022-09-20 PROCEDURE — 77012 CT SCAN FOR NEEDLE BIOPSY: CPT

## 2022-09-20 PROCEDURE — 73202 CT UPPR EXTREMITY W/O&W/DYE: CPT

## 2022-09-20 PROCEDURE — 25010000002 IOPAMIDOL 61 % SOLUTION: Performed by: RADIOLOGY

## 2022-09-20 RX ORDER — LIDOCAINE HYDROCHLORIDE 20 MG/ML
INJECTION, SOLUTION INFILTRATION; PERINEURAL
Status: COMPLETED | OUTPATIENT
Start: 2022-09-20 | End: 2022-09-20

## 2022-09-20 RX ADMIN — LIDOCAINE HYDROCHLORIDE 10 ML: 20 INJECTION, SOLUTION INFILTRATION; PERINEURAL at 10:43

## 2022-09-20 RX ADMIN — IOPAMIDOL 7 ML: 612 INJECTION, SOLUTION INTRAVENOUS at 10:45

## 2022-09-20 NOTE — POST-PROCEDURE NOTE
INTERVENTIONAL RADIOLOGY: BRIEF OP NOTE    Pre-Procedure Diagnosis: right shoulder pain, concern for RTC tear/re-tear  Post-Procedure Diagnosis: same  Procedure: Fluoroscopically Guided Right Glenohumeral Joint Arthrogaphic Injection  Anesthesia: local  Staff: Susan Gee M.D.  EBL: none  Specimens: none  Drains: none  Findings: Appropriate contrast layering in right glenohumeral joint capsule. 7mL Isovue-300/7mL sterile saline administered to right glenohumeral joint.  Complications: none    Susan Gee M.D.  Vascular, Interventional and Wound Physician  IR Chief, [facility]  Cell: [phone] / Office: [phone]

## 2022-09-20 NOTE — PRE-PROCEDURE NOTE
INTERVENTIONAL RADIOLOGY    63yo F PSgHx cardiac cath, left ankle I&D, left ankle arthroscopy, left subtalar arthrodesis, foot amputation, coronary PTA, right breast biopsy, CTR, cholecystectomy, gastric band, TASH/BSO, shoulder surgery, CABG, and bladder stimulator PMH endocarditis, anxiety, depression, BPPV, DM2, HLD, gastroparesis, GERD, HTN, referred by Dr. Lazo for right shoulder CT Arthrographic injection for pain lifting right arm and history of prior RTC tear 2006 s/p repair. Cannot have MRI d/t bladder stim. Concern for RTC integrity/integrity of prior repair.    On exam, obese, alert, VSS, LBKA.    The patient's history and physical exam were reviewed, and no changes were noted. The risks, benefits, alternatives, and indications of the procedure were discussed with the patient, and all questions were answered. Informed consent was obtained.    Thank you very much for the referral. Please do not hesitate to contact me if I may be of further assistance.    Susan Gee M.D.  Vascular, Interventional and Wound Physician  IR Chief, [facility]  Cell: [phone] / Office: [phone]

## 2022-09-23 ENCOUNTER — OFFICE VISIT (OUTPATIENT)
Dept: ORTHOPEDIC SURGERY | Facility: CLINIC | Age: 62
End: 2022-09-23

## 2022-09-23 VITALS — WEIGHT: 262 LBS | HEIGHT: 68 IN | BODY MASS INDEX: 39.71 KG/M2

## 2022-09-23 DIAGNOSIS — M12.811 ROTATOR CUFF ARTHROPATHY, RIGHT: ICD-10-CM

## 2022-09-23 DIAGNOSIS — K21.9 GERD WITHOUT ESOPHAGITIS: ICD-10-CM

## 2022-09-23 DIAGNOSIS — E11.65 TYPE 2 DIABETES MELLITUS WITH HYPERGLYCEMIA, WITH LONG-TERM CURRENT USE OF INSULIN: ICD-10-CM

## 2022-09-23 DIAGNOSIS — Z79.4 TYPE 2 DIABETES MELLITUS WITH HYPERGLYCEMIA, WITH LONG-TERM CURRENT USE OF INSULIN: ICD-10-CM

## 2022-09-23 DIAGNOSIS — M75.101 ROTATOR CUFF SYNDROME, RIGHT: ICD-10-CM

## 2022-09-23 DIAGNOSIS — M25.511 ACUTE PAIN OF RIGHT SHOULDER: Primary | ICD-10-CM

## 2022-09-23 PROCEDURE — 99213 OFFICE O/P EST LOW 20 MIN: CPT | Performed by: ORTHOPAEDIC SURGERY

## 2022-09-23 NOTE — PROGRESS NOTES
"Ariana Martinez is a 62 y.o. female returns for     Chief Complaint   Patient presents with   • Right Shoulder - Follow-up, Pain   • Results       HISTORY OF PRESENT ILLNESS:  F/u right shoulder pain, ct done on 9/20/2022  She reports no specific injury.  She states that the pain has been slowly worsening.  No fevers or chills.  No numbness or tingling.     CONCURRENT MEDICAL HISTORY:    The following portions of the patient's history were reviewed and updated as appropriate: allergies, current medications, past family history, past medical history, past social history, past surgical history and problem list.     ROS  No fevers or chills.  No chest pain or shortness of air.  No GI or  disturbances.    PHYSICAL EXAMINATION:       Ht 172.7 cm (68\")   Wt 119 kg (262 lb)   BMI 39.84 kg/m²     Physical Exam  Constitutional:       General: She is not in acute distress.     Appearance: Normal appearance.   Pulmonary:      Effort: Pulmonary effort is normal. No respiratory distress.   Neurological:      Mental Status: She is alert and oriented to person, place, and time.         GAIT:     []  Normal  []  Antalgic    Assistive device: []  None  []  Walker     []  Crutches  []  Cane     [x]  Wheelchair  []  Stretcher    Right Shoulder Exam     Tenderness   The patient is experiencing tenderness in the acromioclavicular joint and acromion.    Range of Motion   Active abduction: 120 (with pain)   Forward flexion: 140     Muscle Strength   Abduction: 4/5   Supraspinatus: 4/5     Tests   Chou test: positive  Cross arm: positive  Impingement: positive    Other   Erythema: absent  Sensation: normal  Pulse: present              CT shoulder right w wo contrast    Result Date: 9/22/2022  Narrative: PROCEDURE: CT SHOULDER RIGHT W WO CONTRAST CLINICAL INDICATION: Pain COMPARISON STUDY: Radiograph 7/22/2022 TECHNIQUE: 2.0 mm axial images of the shoulder were obtained prior to and after the intra-articular injection of contrast. " Images were acquired utilizing principles of ALARA, dose as low as reasonably achievable DLP is 919.4 FINDINGS: Preinjection images demonstrate degenerative change/hypertrophic change of of the acromioclavicular joint. Degenerative changes of the glenohumeral joint are also present. No destructive or erosive lesions are identified. No acute fractures. There is mild superior subluxation of the humeral head relative to glenoid, somewhat narrowing the acromiohumeral interval. After the injection of intra-articular contrast, there is contrast noted in the subacromial/subdeltoid bursa, suggestive of rotator cuff tear. Evaluation of visualized portion of the thorax demonstrates no significant abnormality. There appears to be a small granuloma in the right upper lobe.     Impression: No acute osseous abnormality. Glenohumeral and acromioclavicular osteoarthrosis Contrast in the subacromial bursa on post injection images, suggesting rotator cuff tear Electronically signed by:  Janki Molina MD  9/22/2022 12:24 PM CDT Workstation: [id number]    FL Contrast Injection CT / MRI    Result Date: 9/23/2022  Narrative: IR ARTHROGRAPHIC INJECTION STAFF: Susan Gee M.D. INDICATION: Right shoulder pain, concern for RTC tear/re-tear MEDICATIONS: lidocaine local anesthetic CONTRAST: 7mL Isovue 300 intra-articular PROCEDURE: The patient was placed supine on the angiography table and an appropriate skin entry site was marked using fluoroscopic guidance. The patient was prepped and draped in sterile fashion. Lidocaine was utilized for local anesthesia. A 22-gauge 90mm spinal needle was advanced into the right glenohumeral capsule utilizing fluoroscopic guidance. Appropriate needle position within the capsule was confirmed with injection of intra-articular contrast. A mixture of 7mL Isovue 300/7mL sterile saline was injected into the joint, which was confirmed fluoroscopically. The needle was removed and sterile dressing applied. FINDINGS:  Contrast injection confirms needle position in the right glenohumeral joint capsule. There is appropriate layering of contrast within the joint capsule. EBL: None FLUOROSCOPY DOSE: 32mGy COMPLICATIONS: None     Impression: Successful right shoulder arthrographic injection. Electronically signed by:  Susan Gee MD  9/23/2022 12:00 PM CDT Workstation: [id number]            ASSESSMENT:    Diagnoses and all orders for this visit:    Acute pain of right shoulder    Rotator cuff syndrome, right  -     Ambulatory Referral to Physical Therapy    GERD without esophagitis    Type 2 diabetes mellitus with hyperglycemia, with long-term current use of insulin (HCC)    Rotator cuff arthropathy, right          PLAN    We discussed continuing meloxicam.  Physical therapy for general strength and conditioning exercises.  Slowly increase activity as pain allows.  Observation for now.  Discussed the possibility of steroid injection.    PT:  Looks to be chronic rotator cuff tear with OA in right shoulder.  Repair of RC unlikely.  Will treat conservatively unless we discuss reverse TSA at some point.  However reverse TSA unlikely for her due to bka and dependence on arms for mobility    Return in about 6 weeks (around 11/4/2022) for recheck.    Rohan Lazo MD

## 2022-09-28 ENCOUNTER — TELEPHONE (OUTPATIENT)
Dept: FAMILY MEDICINE CLINIC | Facility: CLINIC | Age: 62
End: 2022-09-28

## 2022-09-29 ENCOUNTER — LAB (OUTPATIENT)
Dept: LAB | Facility: HOSPITAL | Age: 62
End: 2022-09-29

## 2022-09-29 DIAGNOSIS — Z79.4 TYPE 2 DIABETES MELLITUS WITH HYPERGLYCEMIA, WITH LONG-TERM CURRENT USE OF INSULIN: ICD-10-CM

## 2022-09-29 DIAGNOSIS — E11.65 TYPE 2 DIABETES MELLITUS WITH HYPERGLYCEMIA, WITH LONG-TERM CURRENT USE OF INSULIN: ICD-10-CM

## 2022-09-29 DIAGNOSIS — E55.9 VITAMIN D DEFICIENCY: ICD-10-CM

## 2022-09-29 DIAGNOSIS — E78.2 MIXED HYPERLIPIDEMIA: ICD-10-CM

## 2022-09-29 DIAGNOSIS — I10 PRIMARY HYPERTENSION: ICD-10-CM

## 2022-09-29 LAB
25(OH)D3 SERPL-MCNC: 35.4 NG/ML (ref 30–100)
ALBUMIN SERPL-MCNC: 4.1 G/DL (ref 3.5–5.2)
ALBUMIN UR-MCNC: <1.2 MG/DL
ALBUMIN/GLOB SERPL: 1.1 G/DL
ALP SERPL-CCNC: 119 U/L (ref 39–117)
ALT SERPL W P-5'-P-CCNC: 14 U/L (ref 1–33)
ANION GAP SERPL CALCULATED.3IONS-SCNC: 13 MMOL/L (ref 5–15)
AST SERPL-CCNC: 29 U/L (ref 1–32)
BASOPHILS # BLD AUTO: 0.06 10*3/MM3 (ref 0–0.2)
BASOPHILS NFR BLD AUTO: 0.5 % (ref 0–1.5)
BILIRUB SERPL-MCNC: 0.4 MG/DL (ref 0–1.2)
BUN SERPL-MCNC: 17 MG/DL (ref 8–23)
BUN/CREAT SERPL: 15.5 (ref 7–25)
CALCIUM SPEC-SCNC: 9.8 MG/DL (ref 8.6–10.5)
CHLORIDE SERPL-SCNC: 99 MMOL/L (ref 98–107)
CHOLEST SERPL-MCNC: 164 MG/DL (ref 0–200)
CO2 SERPL-SCNC: 26 MMOL/L (ref 22–29)
CREAT SERPL-MCNC: 1.1 MG/DL (ref 0.57–1)
CREAT UR-MCNC: 30 MG/DL
CREAT UR-MCNC: 30 MG/DL
DEPRECATED RDW RBC AUTO: 46.2 FL (ref 37–54)
EGFRCR SERPLBLD CKD-EPI 2021: 57.6 ML/MIN/1.73
EOSINOPHIL # BLD AUTO: 0.53 10*3/MM3 (ref 0–0.4)
EOSINOPHIL NFR BLD AUTO: 4.1 % (ref 0.3–6.2)
ERYTHROCYTE [DISTWIDTH] IN BLOOD BY AUTOMATED COUNT: 15.1 % (ref 12.3–15.4)
GLOBULIN UR ELPH-MCNC: 3.6 GM/DL
GLUCOSE SERPL-MCNC: 95 MG/DL (ref 65–99)
HBA1C MFR BLD: 9.5 % (ref 4.8–5.6)
HCT VFR BLD AUTO: 39.5 % (ref 34–46.6)
HDLC SERPL-MCNC: 52 MG/DL (ref 40–60)
HGB BLD-MCNC: 13.1 G/DL (ref 12–15.9)
IMM GRANULOCYTES # BLD AUTO: 0.05 10*3/MM3 (ref 0–0.05)
IMM GRANULOCYTES NFR BLD AUTO: 0.4 % (ref 0–0.5)
LDLC SERPL CALC-MCNC: 96 MG/DL (ref 0–100)
LDLC/HDLC SERPL: 1.82 {RATIO}
LYMPHOCYTES # BLD AUTO: 2.77 10*3/MM3 (ref 0.7–3.1)
LYMPHOCYTES NFR BLD AUTO: 21.2 % (ref 19.6–45.3)
MCH RBC QN AUTO: 28 PG (ref 26.6–33)
MCHC RBC AUTO-ENTMCNC: 33.2 G/DL (ref 31.5–35.7)
MCV RBC AUTO: 84.4 FL (ref 79–97)
MICROALBUMIN/CREAT UR: NORMAL MG/G{CREAT}
MONOCYTES # BLD AUTO: 0.7 10*3/MM3 (ref 0.1–0.9)
MONOCYTES NFR BLD AUTO: 5.4 % (ref 5–12)
NEUTROPHILS NFR BLD AUTO: 68.4 % (ref 42.7–76)
NEUTROPHILS NFR BLD AUTO: 8.97 10*3/MM3 (ref 1.7–7)
NRBC BLD AUTO-RTO: 0 /100 WBC (ref 0–0.2)
PLATELET # BLD AUTO: 431 10*3/MM3 (ref 140–450)
PMV BLD AUTO: 9.8 FL (ref 6–12)
POTASSIUM SERPL-SCNC: 4.3 MMOL/L (ref 3.5–5.2)
PROT ?TM UR-MCNC: 5.2 MG/DL
PROT SERPL-MCNC: 7.7 G/DL (ref 6–8.5)
PROT/CREAT UR: 173.3 MG/G CREA (ref 0–200)
RBC # BLD AUTO: 4.68 10*6/MM3 (ref 3.77–5.28)
SODIUM SERPL-SCNC: 138 MMOL/L (ref 136–145)
TRIGL SERPL-MCNC: 88 MG/DL (ref 0–150)
VIT B12 BLD-MCNC: 639 PG/ML (ref 211–946)
VLDLC SERPL-MCNC: 16 MG/DL (ref 5–40)
WBC NRBC COR # BLD: 13.08 10*3/MM3 (ref 3.4–10.8)

## 2022-09-29 PROCEDURE — 82607 VITAMIN B-12: CPT

## 2022-09-29 PROCEDURE — 80050 GENERAL HEALTH PANEL: CPT

## 2022-09-29 PROCEDURE — 36415 COLL VENOUS BLD VENIPUNCTURE: CPT

## 2022-09-29 PROCEDURE — 80061 LIPID PANEL: CPT

## 2022-09-29 PROCEDURE — 82570 ASSAY OF URINE CREATININE: CPT | Performed by: NURSE PRACTITIONER

## 2022-09-29 PROCEDURE — 82306 VITAMIN D 25 HYDROXY: CPT

## 2022-09-29 PROCEDURE — 82043 UR ALBUMIN QUANTITATIVE: CPT | Performed by: NURSE PRACTITIONER

## 2022-09-29 PROCEDURE — 84156 ASSAY OF PROTEIN URINE: CPT | Performed by: NURSE PRACTITIONER

## 2022-09-29 PROCEDURE — 83036 HEMOGLOBIN GLYCOSYLATED A1C: CPT

## 2022-09-30 LAB — TSH SERPL DL<=0.05 MIU/L-ACNC: 7.9 UIU/ML (ref 0.27–4.2)

## 2022-09-30 RX ORDER — HYDROCHLOROTHIAZIDE 12.5 MG/1
TABLET ORAL
Qty: 30 TABLET | Refills: 1 | Status: SHIPPED | OUTPATIENT
Start: 2022-09-30 | End: 2022-10-24

## 2022-10-03 ENCOUNTER — TELEPHONE (OUTPATIENT)
Dept: FAMILY MEDICINE CLINIC | Facility: CLINIC | Age: 62
End: 2022-10-03

## 2022-10-03 ENCOUNTER — OFFICE VISIT (OUTPATIENT)
Dept: ENDOCRINOLOGY | Facility: CLINIC | Age: 62
End: 2022-10-03

## 2022-10-03 VITALS
BODY MASS INDEX: 39.4 KG/M2 | HEIGHT: 68 IN | WEIGHT: 260 LBS | DIASTOLIC BLOOD PRESSURE: 78 MMHG | RESPIRATION RATE: 18 BRPM | HEART RATE: 76 BPM | SYSTOLIC BLOOD PRESSURE: 136 MMHG | OXYGEN SATURATION: 99 %

## 2022-10-03 DIAGNOSIS — Z79.4 TYPE 2 DIABETES MELLITUS WITH HYPERGLYCEMIA, WITH LONG-TERM CURRENT USE OF INSULIN: ICD-10-CM

## 2022-10-03 DIAGNOSIS — E03.9 ACQUIRED HYPOTHYROIDISM: ICD-10-CM

## 2022-10-03 DIAGNOSIS — I10 PRIMARY HYPERTENSION: ICD-10-CM

## 2022-10-03 DIAGNOSIS — E78.2 MIXED HYPERLIPIDEMIA: Primary | ICD-10-CM

## 2022-10-03 DIAGNOSIS — E11.65 TYPE 2 DIABETES MELLITUS WITH HYPERGLYCEMIA, WITH LONG-TERM CURRENT USE OF INSULIN: ICD-10-CM

## 2022-10-03 DIAGNOSIS — I25.118 CORONARY ARTERY DISEASE OF NATIVE ARTERY OF NATIVE HEART WITH STABLE ANGINA PECTORIS: Chronic | ICD-10-CM

## 2022-10-03 DIAGNOSIS — E55.9 VITAMIN D DEFICIENCY: ICD-10-CM

## 2022-10-03 PROCEDURE — 99214 OFFICE O/P EST MOD 30 MIN: CPT | Performed by: NURSE PRACTITIONER

## 2022-10-03 RX ORDER — FLUTICASONE PROPIONATE 50 MCG
SPRAY, SUSPENSION (ML) NASAL
Qty: 16 ML | Refills: 0 | Status: SHIPPED | OUTPATIENT
Start: 2022-10-03 | End: 2022-10-26

## 2022-10-03 RX ORDER — LEVOTHYROXINE SODIUM 0.03 MG/1
25 TABLET ORAL DAILY
Qty: 30 TABLET | Refills: 11 | Status: SHIPPED | OUTPATIENT
Start: 2022-10-03

## 2022-10-03 RX ORDER — INSULIN ASPART 100 [IU]/ML
INJECTION, SOLUTION INTRAVENOUS; SUBCUTANEOUS
Qty: 90 ML | Refills: 11 | Status: SHIPPED | OUTPATIENT
Start: 2022-10-03 | End: 2023-03-09

## 2022-10-03 RX ORDER — MELOXICAM 15 MG/1
15 TABLET ORAL DAILY
COMMUNITY
Start: 2022-09-15 | End: 2022-12-07

## 2022-10-03 RX ORDER — CLOPIDOGREL BISULFATE 75 MG/1
75 TABLET ORAL DAILY
Qty: 90 TABLET | Refills: 0 | Status: SHIPPED | OUTPATIENT
Start: 2022-10-03 | End: 2023-03-28 | Stop reason: SDUPTHER

## 2022-10-03 RX ORDER — FUROSEMIDE 40 MG/1
TABLET ORAL
Qty: 30 TABLET | Refills: 0 | Status: SHIPPED | OUTPATIENT
Start: 2022-10-03 | End: 2022-11-16

## 2022-10-03 RX ORDER — CLOBETASOL PROPIONATE 0.5 MG/G
OINTMENT TOPICAL
COMMUNITY
Start: 2022-09-17 | End: 2022-11-18

## 2022-10-03 NOTE — PROGRESS NOTES
"Chief Complaint  Follow-up and Diabetes (3 mo)    Subjective          Ariana Martinez presents to [facility]  Diabetes         In office visit       Primary provider BEBE Villatoro       62 female presents for follow-up    Reason diabetes mellitus type 2    Diagnosed at age 26    Timing constant    Severity high    Quality not controlled      Macrovascular complications CAD, 2 stents placed in February 2020      Microvascular complications neuropathy, left BKA     Rash on right lower leg             Current diabetes regimen      Insulin by injections and oral medication      Current glucose monitoring    Fingersticks    Checks 4 times daily    Am readings 180 or less       Daytime   --- 190 or less    Rare 200       She has had lows under 70 and occurs early morning hours     Lowest 47       Review of Systems - General ROS: rash         Objective   Vital Signs:   /78   Pulse 76   Resp 18   Ht 172.7 cm (68\")   Wt 118 kg (260 lb)   SpO2 99%   BMI 39.53 kg/m²     Physical Exam  Constitutional:       Appearance: Normal appearance.   Cardiovascular:      Rate and Rhythm: Regular rhythm.      Heart sounds: Normal heart sounds.   Pulmonary:      Breath sounds: Normal breath sounds.   Musculoskeletal:      Cervical back: Normal range of motion.   Neurological:      Mental Status: She is alert.        Result Review :   The following data was reviewed by: BEBE Prince on 02/21/2022:  Common labs    Common Labs 7/6/22 7/6/22 8/29/22 9/29/22 9/29/22 9/29/22 9/29/22 9/29/22    0533 0533  0805 0805 0805 0805 0959   Glucose  252 (A)   95      BUN  7 (A)   17      Creatinine  0.80   1.10 (A)      Sodium  139   138      Potassium  4.1   4.3      Chloride  101   99      Calcium  9.5   9.8      Albumin     4.10      Total Bilirubin     0.4      Alkaline Phosphatase     119 (A)      AST (SGOT)     29      ALT (SGPT)     14      WBC 9.05  13.83 (A) 13.08 (A)     "   Hemoglobin 12.0  13.2 13.1       Hematocrit 36.0  39.0 39.5       Platelets 441  440 431       Total Cholesterol      164     Triglycerides      88     HDL Cholesterol      52     LDL Cholesterol       96     Hemoglobin A1C       9.50 (A)    Microalbumin, Urine        <1.2   (A) Abnormal value                        Assessment and Plan    Diagnoses and all orders for this visit:    1. Mixed hyperlipidemia (Primary)  -     CBC & Differential; Future  -     Comprehensive Metabolic Panel; Future  -     Hemoglobin A1c; Future  -     Lipid Panel; Future  -     Microalbumin / Creatinine Urine Ratio - Urine, Clean Catch; Future  -     TSH; Future  -     Vitamin D 25 Hydroxy; Future    2. Vitamin D deficiency  -     CBC & Differential; Future  -     Comprehensive Metabolic Panel; Future  -     Hemoglobin A1c; Future  -     Lipid Panel; Future  -     Microalbumin / Creatinine Urine Ratio - Urine, Clean Catch; Future  -     TSH; Future  -     Vitamin D 25 Hydroxy; Future    3. Type 2 diabetes mellitus with hyperglycemia, with long-term current use of insulin (HCC)  -     CBC & Differential; Future  -     Comprehensive Metabolic Panel; Future  -     Hemoglobin A1c; Future  -     Lipid Panel; Future  -     Microalbumin / Creatinine Urine Ratio - Urine, Clean Catch; Future  -     TSH; Future  -     Vitamin D 25 Hydroxy; Future    4. Primary hypertension  -     CBC & Differential; Future  -     Comprehensive Metabolic Panel; Future  -     Hemoglobin A1c; Future  -     Lipid Panel; Future  -     Microalbumin / Creatinine Urine Ratio - Urine, Clean Catch; Future  -     TSH; Future  -     Vitamin D 25 Hydroxy; Future    5. Acquired hypothyroidism    Other orders  -     levothyroxine (SYNTHROID, LEVOTHROID) 25 MCG tablet; Take 1 tablet by mouth Daily.  Dispense: 30 tablet; Refill: 11  -     insulin aspart (NovoLOG FlexPen) 100 UNIT/ML solution pen-injector sc pen; Inject up to 45 units  3 TIMES A DAY BEFORE MEALS  Dispense: 90 mL;  Refill: 11               Glycemic Management     Diabetes mellitus not controlled         Lab Results   Component Value Date    HGBA1C 9.50 (H) 09/29/2022             Dexcom sensor ---off due to finances          Basaglar taking 40  units BID --change to 40 units am and decrease to 35 pm              Novolog taking for meals     Taking 25 units TID --- increase up to 35 units TID     We discussed adjusting for meals and bg         + sliding scale                ==================     Jardiance-- stopped       Metformin--stopped       bydureon - stopped     Victoza stopped due to side effects         januvia 100 mg daily  -stopped               Lipid Management        Taking lipitor 80- mg one at night               LDL needs to be 70 or less           Total Cholesterol   Date Value Ref Range Status   09/29/2022 164 0 - 200 mg/dL Final     Triglycerides   Date Value Ref Range Status   09/29/2022 88 0 - 150 mg/dL Final     HDL Cholesterol   Date Value Ref Range Status   09/29/2022 52 40 - 60 mg/dL Final     LDL Cholesterol    Date Value Ref Range Status   09/29/2022 96 0 - 100 mg/dL Final           Blood Pressure Management: Control of blood pressure  on ACEi     stopped the lasix     Taking norvasc 2.5 mg increase to 5 mg daily     Taking metoprolol         Microvascular Complication Monitoring:     Neurontin 100 mg TID       Lab Results   Component Value Date    MICROALBUR <1.2 09/29/2022               intermittetly takes reglan for gastroparesis     states eye exam was March 2021, no DR      Left BKA     Follows with podiatry         ckd stage III                Other Diabetes Related Aspects     TSH abnormal from July to Sept 2014     TSH in the 5 to 7 range        Start levothyroxine 25 mcg daily         Lab Results   Component Value Date    TSH 7.900 (H) 09/29/2022       Vitamin D deficiency        Taking vitamin D supplement           Component      Latest Ref Rng & Units 8/11/2021   25 Hydroxy, Vitamin D       ng/ml 32.6            Lab Results   Component Value Date    [id number] 639 09/29/2022                      Follow Up   Return in about 3 months (around 1/3/2023) for Recheck.  Patient was given instructions and counseling regarding her condition or for health maintenance advice. Please see specific information pulled into the AVS if appropriate.         This document has been electronically signed by BEBE Prince on October 3, 2022 09:05 CDT.

## 2022-10-03 NOTE — TELEPHONE ENCOUNTER
clopidogrel (PLAVIX) 75 MG tablet    MAIL ORDER WANTS A 3 MONTH SUPPLY 90 DAYS     [facility]/pharmacy #[id number] - [place], [place] - [address] [place] [address] - [phone]  - [phone] [address] [address]   Phone:  [phone]  Fax:  [phone]

## 2022-10-04 DIAGNOSIS — E11.43 DIABETIC GASTROPARESIS: Chronic | ICD-10-CM

## 2022-10-04 DIAGNOSIS — R11.0 CHRONIC NAUSEA: Chronic | ICD-10-CM

## 2022-10-04 DIAGNOSIS — K31.84 DIABETIC GASTROPARESIS: Chronic | ICD-10-CM

## 2022-10-04 RX ORDER — METOCLOPRAMIDE 10 MG/1
10 TABLET ORAL 4 TIMES DAILY PRN
Qty: 120 TABLET | Refills: 1 | OUTPATIENT
Start: 2022-10-04

## 2022-10-04 RX ORDER — METOCLOPRAMIDE 10 MG/1
10 TABLET ORAL 4 TIMES DAILY PRN
Qty: 120 TABLET | Refills: 1 | Status: SHIPPED | OUTPATIENT
Start: 2022-10-04 | End: 2022-11-29

## 2022-10-09 NOTE — PROGRESS NOTES
Subjective   Ariana Martinez is a 62 y.o. female who presents to the office to establish care which will include surveillance of several chronic conditions including but not limited to phantom pain after amputation of lower extremity, HTN, GERD (also has an ulcer), anxiety, depression, vit D defic, OA, chronic low back pain, hyperlipidemia and Type 2 diabetes.  Is currently in P.T. regarding her amputation, has LEFT BKA last Memorial Day.  Using walker for assist.  Specialists she sees include ISAAK Núñez for gastroparesis, alycia Gamboa for diabetes and provider for her prosthesis.    History of Present Illness     The following portions of the patient's history were reviewed and updated as appropriate: allergies, current medications, past family history, past medical history, past social history, past surgical history and problem list.    Review of Systems   Constitutional: Negative for chills, fatigue and fever.   HENT: Negative for congestion, sneezing, sore throat and trouble swallowing.    Eyes: Negative for visual disturbance.   Respiratory: Negative for cough, chest tightness, shortness of breath and wheezing.    Cardiovascular: Negative for chest pain, palpitations and leg swelling.   Gastrointestinal: Negative for abdominal pain, constipation, diarrhea, nausea and vomiting.   Genitourinary: Negative for dysuria, frequency and urgency.   Musculoskeletal: Negative for neck pain.   Skin: Negative for rash.   Neurological: Negative for dizziness, weakness and headaches.   Psychiatric/Behavioral: Positive for dysphoric mood. The patient is nervous/anxious.         In the past two weeks the pt has not felt down, depressed, hopeless or lost interest in doing things   All other systems reviewed and are negative.      Objective   Physical Exam  Vitals and nursing note reviewed.   Constitutional:       Appearance: She is well-developed. She is ill-appearing (chronically).   HENT:      Head: Normocephalic and  atraumatic.      Right Ear: External ear normal.      Left Ear: External ear normal.      Nose: Nose normal.   Eyes:      General: No scleral icterus.        Right eye: No discharge.         Left eye: No discharge.      Conjunctiva/sclera: Conjunctivae normal.      Pupils: Pupils are equal, round, and reactive to light.   Neck:      Thyroid: No thyromegaly.   Cardiovascular:      Rate and Rhythm: Normal rate and regular rhythm.      Heart sounds: Normal heart sounds. No murmur heard.    No friction rub. No gallop.   Pulmonary:      Effort: Pulmonary effort is normal. No respiratory distress.      Breath sounds: Normal breath sounds. No wheezing or rales.   Abdominal:      General: Bowel sounds are normal. There is no distension.      Palpations: Abdomen is soft.      Tenderness: There is no abdominal tenderness. There is no guarding or rebound.   Musculoskeletal:         General: Normal range of motion.      Cervical back: Normal range of motion and neck supple.      Left Lower Extremity: Left leg is amputated below knee.   Lymphadenopathy:      Cervical: No cervical adenopathy.   Skin:     General: Skin is warm and dry.      Capillary Refill: Capillary refill takes less than 2 seconds.      Findings: No rash.   Neurological:      General: No focal deficit present.      Mental Status: She is alert and oriented to person, place, and time.      GCS: GCS eye subscore is 4. GCS verbal subscore is 5. GCS motor subscore is 6.      Cranial Nerves: No cranial nerve deficit.   Psychiatric:         Attention and Perception: Attention and perception normal.         Mood and Affect: Mood and affect normal.         Speech: Speech normal.         Behavior: Behavior normal.         Thought Content: Thought content normal. Thought content does not include homicidal or suicidal ideation. Thought content does not include homicidal or suicidal plan.         Cognition and Memory: Cognition and memory normal.         Judgment: Judgment  normal.      Comments: Dressed appropriately for weather and situation, makes eye contact and engages in conversation         Assessment & Plan   Diagnoses and all orders for this visit:    1. Encounter to establish care (Primary)    2. Phantom pain after amputation of lower extremity (HCC)  -     HYDROcodone-acetaminophen (NORCO) 7.5-325 MG per tablet; Take 1 tablet by mouth Every 6 (Six) Hours As Needed for Moderate Pain.  Dispense: 120 tablet; Refill: 0    3. Chronic right-sided low back pain with right-sided sciatica  -     HYDROcodone-acetaminophen (NORCO) 7.5-325 MG per tablet; Take 1 tablet by mouth Every 6 (Six) Hours As Needed for Moderate Pain.  Dispense: 120 tablet; Refill: 0    4. Neuropathy  Comments:  stable. continue gabapentin 100mg bid  Orders:  -     gabapentin (NEURONTIN) 100 MG capsule; Take 1 capsule by mouth Every 12 (Twelve) Hours.  Dispense: 60 capsule; Refill: 0    5. Type 2 diabetes mellitus with diabetic autonomic neuropathy, with long-term current use of insulin ([facility])  Comments:  managed by Ms Lawson, endocrinology, [facility].   Orders:  -     gabapentin (NEURONTIN) 100 MG capsule; Take 1 capsule by mouth Every 12 (Twelve) Hours.  Dispense: 60 capsule; Refill: 0    6. Mixed hyperlipidemia  Comments:  worsening, ordered increased atorvastatin to 80, pt refuses, will have to call and cancel order. wants to work on her diet. info given.   Orders:  -     atorvastatin (LIPITOR) 80 MG tablet; Take 1 tablet by mouth Daily.  Dispense: 90 tablet; Refill: 1    Other orders  -     Discontinue: furosemide (LASIX) 40 MG tablet; Take 1 tablet by mouth Daily.  Dispense: 30 tablet; Refill: 0  -     Discontinue: hydroCHLOROthiazide (HYDRODIURIL) 12.5 MG oral; Take 1 each by mouth Daily.  Dispense: 30 each; Refill: 0  -     ondansetron (ZOFRAN) 8 MG tablet; Take 1 tablet by mouth Every 8 (Eight) Hours As Needed for Nausea or Vomiting.  Dispense: 18 tablet; Refill: 1  -     Discontinue: fluticasone  (FLONASE) 50 MCG/ACT nasal spray; 2 sprays into the nostril(s) as directed by provider Daily. Administer 2 sprays in each nostril for each dose.  Dispense: 9.9 mL; Refill: 0    Encouraged to continue with medications as ordered.  Will need a [employer] update on her.  Will send in B12 supplies to [facility] in [place].  Encouraged to follow cardiac ADA diet.  Adhere to all specialists' instructions.     Patient understands the risks associated with this controlled medication, including tolerance and addiction.  Patient also agrees to only obtain this medication from me, and not from a another provider, unless that provider is covering for me in my absence.  Patient also agrees to be compliant in dosing, and not self adjust the dose of medication.  A signed controlled substance agreement is on file, and the patient has received a controlled substance education sheet at this a previous visit.  The patient has also signed a consent for treatment with a controlled substance as per [facility] policy. LESTER was obtained, reviewed # [id number].          This document has been electronically signed by BEBE Rob on October 9, 2022 06:15 CDT

## 2022-10-14 ENCOUNTER — OFFICE VISIT (OUTPATIENT)
Dept: CARDIOLOGY | Facility: CLINIC | Age: 62
End: 2022-10-14

## 2022-10-14 VITALS
OXYGEN SATURATION: 97 % | DIASTOLIC BLOOD PRESSURE: 80 MMHG | BODY MASS INDEX: 38.71 KG/M2 | WEIGHT: 255.4 LBS | HEART RATE: 82 BPM | SYSTOLIC BLOOD PRESSURE: 138 MMHG | HEIGHT: 68 IN

## 2022-10-14 DIAGNOSIS — G89.29 CHRONIC RIGHT-SIDED LOW BACK PAIN WITH RIGHT-SIDED SCIATICA: ICD-10-CM

## 2022-10-14 DIAGNOSIS — M54.41 CHRONIC RIGHT-SIDED LOW BACK PAIN WITH RIGHT-SIDED SCIATICA: ICD-10-CM

## 2022-10-14 DIAGNOSIS — I10 PRIMARY HYPERTENSION: Chronic | ICD-10-CM

## 2022-10-14 DIAGNOSIS — Z95.1 S/P CABG (CORONARY ARTERY BYPASS GRAFT): Primary | Chronic | ICD-10-CM

## 2022-10-14 DIAGNOSIS — G54.6 PHANTOM PAIN AFTER AMPUTATION OF LOWER EXTREMITY: Chronic | ICD-10-CM

## 2022-10-14 DIAGNOSIS — I50.32 CHRONIC HEART FAILURE WITH PRESERVED EJECTION FRACTION: Chronic | ICD-10-CM

## 2022-10-14 DIAGNOSIS — R60.0 BILATERAL LEG EDEMA: ICD-10-CM

## 2022-10-14 DIAGNOSIS — E66.01 CLASS 3 SEVERE OBESITY DUE TO EXCESS CALORIES WITHOUT SERIOUS COMORBIDITY WITH BODY MASS INDEX (BMI) OF 40.0 TO 44.9 IN ADULT: Chronic | ICD-10-CM

## 2022-10-14 DIAGNOSIS — E78.2 MIXED HYPERLIPIDEMIA: Chronic | ICD-10-CM

## 2022-10-14 PROCEDURE — 99214 OFFICE O/P EST MOD 30 MIN: CPT | Performed by: INTERNAL MEDICINE

## 2022-10-14 RX ORDER — HYDROCODONE BITARTRATE AND ACETAMINOPHEN 7.5; 325 MG/1; MG/1
1 TABLET ORAL EVERY 6 HOURS PRN
Qty: 120 TABLET | Refills: 0 | Status: SHIPPED | OUTPATIENT
Start: 2022-10-14 | End: 2022-11-11 | Stop reason: SDUPTHER

## 2022-10-14 NOTE — PROGRESS NOTES
Ariana Martinez  62 y.o. female      1. S/P CABG (coronary artery bypass graft)    2. Mixed hyperlipidemia    3. Primary hypertension    4. Class 3 severe obesity due to excess calories without serious comorbidity with body mass index (BMI) of 40.0 to 44.9 in adult (HCC)    5. Chronic heart failure with preserved ejection fraction (HCC)        History of Present Illness  Ariana Martinez is a 62-year-old  lady with a complex medical history which is remarkable for coronary artery disease status post coronary artery bypass surgery in 2005, insulin-dependent type 2 diabetes mellitus with complications including gastroparesis, hypertension, anxiety/depression, hyperlipidemia, GERD, HFpEF,  vitamin D deficiency. She was treated for left foot pain and found to have hardware infection with MSSA and Acinetobacter.  She was treated for infective endocarditis of the mitral valve.  She was also noted to have a pneumonia on CTA at [facility] in March 2021.    She was admitted to [facility] for evaluation of chest pain in May 2021.  Cardiac enzymes are negative for myocardial injury and EKG showed no acute ST-T wave changes.  Her blood pressure was noted to be markedly elevated.  D-dimer was elevated and CT of the chest was negative for PE.  Lexiscan Cardiolite stress test showed:  · EKG findings equivocal secondary to baseline artifacts during Lexiscan infusion  · Small apical fixed defect noted. No significant reversible ischemia noted. Perfusion in the septum, anterior wall, lateral wall and inferior wall appear normal  · Ejection fraction 60%. No wall motion abnormalities  · Low risk study  Echocardiogram showed:  · The study is technically difficult for diagnosis. The quality of the study is limited due to patient body habitus and lung disease.  · Estimated left ventricular EF = 67% Left ventricular ejection fraction appears to be 66 - 70%. Left ventricular systolic function is normal.  · Left  ventricular diastolic function is consistent with (grade I) impaired relaxation.  · Estimated right ventricular systolic pressure from tricuspid regurgitation is normal (<35 mmHg).      Her last cardiac catheterization was in February 2020 by Dr. Cooper which showed:  This patient who had a LIMA bypass in 2005 that is nonfunctioning.  The LAD is total from the midportion on.  The only flow given to the LAD region which also would include the apex in the inferoapical portion on a wraparound LAD is in the proximal LAD going into the diagonal branch.  This is been successfully stented with drug-eluting stents x2.  She has flow into the circumflex and right coronary artery and now except for the mid LAD portion is completely revascularized once again.     She denied any chest pain or shortness of breath at this time and her predominant symptoms relates to swelling in the lower extremities.  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  Clinical exam today showed no signs of pulmonary congestion.  Heart rate and blood pressure were in the normal range.    Allergies   Allergen Reactions   • Seroquel [Quetiapine] Anaphylaxis   • Avandia [Rosiglitazone] Swelling   • Morphine And Related Hallucinations   • Oxycodone Hallucinations         Past Medical History:   Diagnosis Date   • Acute bacterial endocarditis 3/13/2021   • Angina, class IV ([facility])    • Anxiety    • Anxiety and depression    • Arthritis    • Benign paroxysmal positional vertigo    • Bladder disorder     has bladder stimulator   • Carpal tunnel syndrome    • CHF (congestive heart failure) ([facility])    • Chronic pain    • Coronary atherosclerosis     hx CABG 2005.  is followed by Dr Kwon   • Depression    • Diabetes mellitus ([facility])     Type 2, controlled   • Diabetic polyneuropathy ([facility])    • Disease of thyroid gland    • Elevated cholesterol    • Female stress incontinence    • Foot pain, left    • Full dentures    • Gastroparesis    • GERD  (gastroesophageal reflux disease)    • Hyperlipidemia    • Hypertension    • Low back pain    • Malaise and fatigue    • Multiple joint pain    • Obesity     Refuses to be weighed   • Occlusion and stenosis of bilateral carotid arteries 6/18/2021   • Otalgia     Both   • Perforation of tympanic membrane     Left   • Postoperative wound infection    • Vitamin D deficiency    • Wears glasses     reading         Past Surgical History:   Procedure Laterality Date   • ABDOMINAL SURGERY     • AMPUTATION FOOT     • ANGIOPLASTY      coronary   • ANKLE ARTHROSCOPY Left 2/26/2021    Procedure: Left foot hardware removal, ankle arthroscopy, bone grafting , left foot exostectomy;  Surgeon: Ignacio Lord DPM;  Location: [facility] OR;  Service: Podiatry;  Laterality: Left;   • BREAST BIOPSY Right    • CARDIAC CATHETERIZATION     • CARDIAC CATHETERIZATION N/A 6/20/2017    Procedure: Right Heart Cath;  Surgeon: Can Kwon MD PhD;  Location: [facility] CATH INVASIVE LOCATION;  Service:    • CARDIAC CATHETERIZATION N/A 2/18/2020    Procedure: Left Heart Cath;  Surgeon: Catalina Cooper MD;  Location: [facility] CATH INVASIVE LOCATION;  Service: Cardiology;  Laterality: N/A;   • CARDIAC CATHETERIZATION N/A 4/6/2022    Procedure: Left Heart Cath;  Surgeon: Sheryl Navas MD;  Location: [facility] CATH INVASIVE LOCATION;  Service: Cardiology;  Laterality: N/A;   • CARPAL TUNNEL RELEASE     • CHOLECYSTECTOMY     • COLONOSCOPY N/A 6/24/2020    Procedure: COLONOSCOPY;  Surgeon: Julián Maldonado MD;  Location: [facility] ENDOSCOPY;  Service: Gastroenterology;  Laterality: N/A;   • CORONARY ARTERY BYPASS GRAFT  2005   • ENDOSCOPY N/A 10/19/2018    Procedure: ESOPHAGOGASTRODUODENOSCOPY possible dilation;  Surgeon: Julián Maldonado MD;  Location: [facility] ENDOSCOPY;  Service: Gastroenterology   • ENDOSCOPY N/A 6/24/2020    Procedure: ESOPHAGOGASTRODUODENOSCOPY WED appt please;  Surgeon: Julián Maldonado MD;  Location: [facility] ENDOSCOPY;   Service: Gastroenterology;  Laterality: N/A;   • ENDOSCOPY N/A 6/10/2022    Procedure: ESOPHAGOGASTRODUODENOSCOPY   room 380;  Surgeon: Jeremiah Wilkins MD;  Location: [facility] ENDOSCOPY;  Service: Gastroenterology;  Laterality: N/A;   • ENDOSCOPY AND COLONOSCOPY     • FOOT SURGERY      Toes   • FOOT SURGERY     • GASTRIC BANDING      Revision, laparoscopic   • HYSTERECTOMY     • INCISION AND DRAINAGE LEG Left 3/12/2021    Procedure: Left ankle arthroscopic irrigation and debridement, screw removal;  Surgeon: Ignacio Lord DPM;  Location: [facility] OR;  Service: Podiatry;  Laterality: Left;   • MOUTH SURGERY     • SALPINGO OOPHORECTOMY     • SHOULDER SURGERY     • SUBTALAR ARTHRODESIS Left 1/16/2019    Procedure: LEFT FOOT HARDWARE REMOVAL, FIFTH METATARSAL , OPEN REDUCTION INTERNAL FIXATION, CALCANEAL OSTEOTOMY;  Surgeon: Ignacio Lord DPM;  Location: [facility] OR;  Service: Podiatry   • SUBTALAR ARTHRODESIS Left 10/16/2019    Procedure: foot hardware removal, subtalar joint fusion  possible de/reattachment of achilles tendon        (c-arm);  Surgeon: Ignacio Lord DPM;  Location: [facility] OR;  Service: Podiatry   • SUBTALAR ARTHRODESIS Left 9/30/2020    Procedure: subtalar, talonavicular joint arthrodesis.  Removal hardware.          (c-arm);  Surgeon: Ignacio Lord DPM;  Location: [facility];  Service: Podiatry;  Laterality: Left;   • TRANSESOPHAGEAL ECHOCARDIOGRAM (LAMONTE)      With color flow         Family History   Problem Relation Age of Onset   • Diabetes Other    • Heart disease Other    • Hypertension Other    • Heart disease Mother    • Stroke Mother    • Hypertension Mother    • Diabetes Sister    • Heart disease Sister    • Hypertension Sister    • Heart disease Sister    • Diabetes Sister    • Hypertension Sister    • Diabetes Sister    • Diabetes Sister    • Diabetes Sister    • Diabetes Sister          Social History     Socioeconomic History   • Marital status:    Tobacco Use   •  Smoking status: Never   • Smokeless tobacco: Never   Vaping Use   • Vaping Use: Never used   Substance and Sexual Activity   • Alcohol use: No   • Drug use: No   • Sexual activity: Defer         Current Outpatient Medications   Medication Sig Dispense Refill   • amLODIPine (NORVASC) 5 MG tablet Take 1 tablet by mouth Daily. 90 tablet 1   • ARIPiprazole (ABILIFY) 5 MG tablet Take 1 tablet by mouth Daily. 90 tablet 1   • aspirin  MG tablet Take 1 tablet by mouth Daily. (Patient taking differently: Take 81 mg by mouth Daily.) 30 tablet 0   • atorvastatin (LIPITOR) 80 MG tablet Take 1 tablet by mouth Daily. 90 tablet 1   • BD SHARPS CONTAINER HOME misc 1 each Take As Directed. 1 each 0   • Blood Glucose Monitoring Suppl (ONE TOUCH ULTRA MINI) w/Device kit Patient will need the Accu-Check Avitio meter 1 each 0   • Calcium Citrate-Vitamin D 250-200 MG-UNIT tablet Take 2 tablets by mouth 2 (two) times a day.     • clobetasol (TEMOVATE) 0.05 % ointment APPLY TO AFFECTED AREA TWICE A DAY     • clopidogrel (PLAVIX) 75 MG tablet Take 1 tablet by mouth Daily. 90 tablet 0   • cyanocobalamin 1000 MCG/ML injection INJECT 1 ML INTO THE APPROPRIATE MUSCLE AS DIRECTED BY PRESCRIBER EVERY 28 (TWENTY-EIGHT) DAYS. 3 mL 2   • fluticasone (FLONASE) 50 MCG/ACT nasal spray INSTILL 2 SPRAYS IN EACH NOSTRIL AS DIRECTED BY PROVIDER DAILY 16 mL 0   • folic acid (FOLVITE) 1 MG tablet TAKE 1 TABLET BY MOUTH EVERY DAY 90 tablet 1   • furosemide (LASIX) 40 MG tablet TAKE 1 TABLET BY MOUTH EVERY DAY 30 tablet 0   • gabapentin (NEURONTIN) 100 MG capsule Take 1 capsule by mouth Every 12 (Twelve) Hours. 60 capsule 0   • glucose blood (Accu-Chek Guide) test strip 1 each by Other route 4 (Four) Times a Day. To test blood sugar 4X daily. For  Dx E11.65 600 each 1   • glucose monitor monitoring kit 1 each Daily. accucheck eve meter, E11.9 1 each 0   • hydroCHLOROthiazide (HYDRODIURIL) 12.5 MG tablet TAKE 1 TABLET BY MOUTH EVERY DAY 30 tablet 1   •  "HYDROcodone-acetaminophen (NORCO) 7.5-325 MG per tablet Take 1 tablet by mouth Every 6 (Six) Hours As Needed for Moderate Pain. 120 tablet 0   • insulin aspart (NovoLOG FlexPen) 100 UNIT/ML solution pen-injector sc pen Inject up to 45 units  3 TIMES A DAY BEFORE MEALS 90 mL 11   • Insulin Glargine (BASAGLAR KWIKPEN) 100 UNIT/ML injection pen INJECT 24 UNITS SUBCUTANEOUSLY AT BEDTIME (Patient taking differently: Inject 40 Units under the skin into the appropriate area as directed Every Night.) 1 pen 7   • Insulin Pen Needle (B-D ULTRAFINE III SHORT PEN) 31G X 8 MM misc USE TO INJECT 5 TIMES A  each 11   • Insulin Pen Needle 31G X 8 MM misc Use up to 4 (four) times daily with insulin as directed. 100 each 0   • isosorbide mononitrate (IMDUR) 30 MG 24 hr tablet TAKE 1 TABLET BY MOUTH EVERY DAY 30 tablet 0   • Lancets (accu-chek soft touch) lancets As directed 100 each 12   • levothyroxine (SYNTHROID, LEVOTHROID) 25 MCG tablet Take 1 tablet by mouth Daily. 30 tablet 11   • meclizine (ANTIVERT) 25 MG tablet Take 1 tablet by mouth 3 (Three) Times a Day As Needed for dizziness. 90 tablet 6   • melatonin 3 MG tablet Take 2 tablets by mouth Every Night. May take 1 if this works, or may take 2 as single dose if more effective. 60 tablet 5   • meloxicam (MOBIC) 15 MG tablet Take 15 mg by mouth Daily. with food     • methocarbamol (ROBAXIN) 500 MG tablet TAKE 1 TABLET BY MOUTH 2 TIMES A DAY AS NEEDED FOR MUSCLE SPASMS. 60 tablet 5   • metoclopramide (REGLAN) 10 MG tablet Take 1 tablet by mouth 4 (Four) Times a Day As Needed (nausea). 120 tablet 1   • metoprolol succinate XL (TOPROL-XL) 50 MG 24 hr tablet TAKE 1 TABLET BY MOUTH DAILY. DOSE INCREASED 5/24/21 90 tablet 1   • naloxone (NARCAN) 0.4 MG/ML injection Infuse 0.5 mL into a venous catheter Every 5 (Five) Minutes As Needed for Opioid Reversal.     • Needle, Disp, (Easy Touch FlipLock Needles) 25G X 1-1/2\" misc 1 each Every 28 (Twenty-Eight) Days. For administering " "B12 cyanocobalomin. Dx vitamin B12 deficiency. 10 each 3   • Needle, Disp, (Hypodermic Needle) 20G X 1\" misc 1 each Every 28 (Twenty-Eight) Days. For drawing up B12 for injection monthly, change back to smaller gauge needle prior to injection. Dx Vitamin B12  Deficiency. 10 each 2   • nitroglycerin (NITROSTAT) 0.4 MG SL tablet Place 1 tablet under the tongue Every 5 (Five) Minutes As Needed for Chest Pain. Take no more than 3 doses in 15 minutes. 20 tablet 11   • ondansetron (ZOFRAN) 8 MG tablet Take 1 tablet by mouth Every 8 (Eight) Hours As Needed for Nausea or Vomiting. 18 tablet 1   • pantoprazole (PROTONIX) 20 MG EC tablet Take 2 tablets by mouth Daily. 90 tablet 3   • ranolazine (RANEXA) 500 MG 12 hr tablet Take 1 tablet by mouth Every 12 (Twelve) Hours. 180 tablet 3   • Syringe 20G X 1-1/2\" 3 ML misc 1 each Every 28 (Twenty-Eight) Days. For injection cyanocobalomin, Dx vit B12 deficiency. 10 each 2   • venlafaxine XR (EFFEXOR-XR) 75 MG 24 hr capsule Take 1 capsule by mouth Daily With Breakfast. 90 capsule 1   • vitamin D (ERGOCALCIFEROL) 1.25 MG (95762 UT) capsule capsule Take 1 capsule by mouth Every 7 (Seven) Days. 36 capsule 1     No current facility-administered medications for this visit.         OBJECTIVE    /80 (BP Location: Left arm, Patient Position: Sitting, Cuff Size: Adult)   Pulse 82   Ht 172.7 cm (68\")   Wt 116 kg (255 lb 6.4 oz) Comment: per patient, can not stand up  SpO2 97%   BMI 38.83 kg/m²         Review of Systems: The following systems are reviewed and changes noted as indicated below    Constitutional:  Denies recent weight loss, weight gain, fever or chills     HENT:  Denies any hearing loss, epistaxis, hoarseness, or difficulty speaking.     Eyes: Wears eyeglasses or contact lenses     Respiratory:  Denies dyspnea with exertion,no cough, wheezing, or hemoptysis.     Cardiovascular: Negative for palpations, chest pain, orthopnea, PND.  See HPI    Gastrointestinal:  Denies " change in bowel habits, dyspepsia, ulcer disease, hematochezia, or melena.     Endocrine: Negative for cold intolerance, heat intolerance, polydipsia, polyphagia and polyuria.     Genitourinary: Negative.      Musculoskeletal: BKA left.  Edema legs    Neurological:  Denies any history of recurrent headaches, strokes, TIA, or seizure disorder.     Hematological: Denies any food allergies, seasonal allergies, bleeding disorders, or lymphadenopathy.       Physical Exam: The following systems are reviewed and no changes noted    Constitutional: Cooperative, alert and oriented,  in no acute distress.     HENT:   Head: Normocephalic, normal hair patterns, no masses or tenderness.  Ears, Nose, and Throat: No gross abnormalities. No pallor or cyanosis. Dentition good.   Eyes: EOMS intact, PERRL, conjunctivae and lids unremarkable. Fundoscopic exam and visual fields not performed.   Neck: No palpable masses or adenopathy, no thyromegaly, no JVD, carotid pulses are full and equal bilaterally and without  Bruits.     Cardiovascular: Regular rhythm, S1 and S2 normal, no S3 or S4.  No murmurs, gallops, or rubs detected.     Pulmonary/Chest: Chest: normal symmetry, normal respiratory excursion, no intercostal retraction, no use of accessory muscles.            Pulmonary: Normal breath sounds. No rales or ronchi.    Abdominal: Abdomen soft, bowel sounds normoactive, no masses, no hepatosplenomegaly, non-tender, no bruits.     Musculoskeletal: No deformities, clubbing, cyanosis, erythema.  Left BKA.  1+ edema right lower extremity    Neurological: No gross motor or sensory deficits noted, affect appropriate, oriented to time, person, place.     Skin: Warm and dry to the touch, no apparent skin lesions or masses noted.     Psychiatric: She has a normal mood and affect. Her behavior is normal. Judgment and thought content normal.         Procedures      Lab Results   Component Value Date    WBC 13.08 (H) 09/29/2022    HGB 13.1  09/29/2022    HCT 39.5 09/29/2022    MCV 84.4 09/29/2022     09/29/2022     Lab Results   Component Value Date    GLUCOSE 95 09/29/2022    BUN 17 09/29/2022    CREATININE 1.10 (H) 09/29/2022    EGFRIFNONA 45 (L) 02/07/2022    BCR 15.5 09/29/2022    CO2 26.0 09/29/2022    CALCIUM 9.8 09/29/2022    ALBUMIN 4.10 09/29/2022    AST 29 09/29/2022    ALT 14 09/29/2022     Lab Results   Component Value Date    CHOL 164 09/29/2022    CHOL 169 06/06/2022    CHOL 154 06/01/2022     Lab Results   Component Value Date    TRIG 88 09/29/2022    TRIG 227 (H) 06/06/2022    TRIG 288 (H) 06/01/2022     Lab Results   Component Value Date    HDL 52 09/29/2022    HDL 41 06/06/2022    HDL 35 (L) 06/01/2022     No components found for: LDLCALC  Lab Results   Component Value Date    LDL 96 09/29/2022    LDL 90 06/06/2022    LDL 72 06/01/2022     No results found for: HDLLDLRATIO  No components found for: CHOLHDL  Lab Results   Component Value Date    HGBA1C 9.50 (H) 09/29/2022     Lab Results   Component Value Date    TSH 7.900 (H) 09/29/2022    E8XYQSK 9.70 08/30/2017    THYROIDAB <6 01/02/2015           ASSESSMENT AND PLAN  Ariana Martinez is a 62-year-old lady with a complex medical history as discussed in detail under history of present illness.  She denies any cardiac symptoms at the present time and no signs of arrhythmia or congestive heart failure noted.     I suspect that the swelling in the lower extremities secondary to venous stasis.  DVT has been ruled out on multiple occasions in the past.  I believe that she would benefit from wearing compression stockings knee-high on a regular basis.  Amlodipine could be contributing to edema.  To make sure that the right heart pressures are normal and LV function is normal and echocardiogram is being arranged.    Her lab results from 9/29/2022 were reviewed and TSH was 7.9, hemoglobin A1c 9.5.  LDL was 96 and HDL 52.  BUN was 17 and creatinine 1.1.     I have continued antiplatelet  therapy with aspirin and Plavix, lipid-lowering therapy with atorvastatin, antihypertensive therapy with metoprolol succinate, amlodipine, lisinopril, HCTZ.  She is on Lasix as well.      Diagnoses and all orders for this visit:    1. S/P CABG (coronary artery bypass graft) (Primary)    2. Mixed hyperlipidemia    3. Primary hypertension    4. Class 3 severe obesity due to excess calories without serious comorbidity with body mass index (BMI) of 40.0 to 44.9 in adult ([facility])    5. Chronic heart failure with preserved ejection fraction (HCC)        Patient's Body mass index is 38.83 kg/m². indicating that she is obese (BMI >30). Obesity-related health conditions include the following: hypertension, coronary heart disease, diabetes mellitus and dyslipidemias. Obesity is unchanged. BMI is is above average; no BMI management plan is appropriate. We discussed portion control and increasing exercise..      Ariana Martinez  reports that she has never smoked. She has never used smokeless tobacco..          Bill Gibson MD  10/14/2022  10:51 CDT

## 2022-10-19 DIAGNOSIS — Z79.4 TYPE 2 DIABETES MELLITUS WITH DIABETIC AUTONOMIC NEUROPATHY, WITH LONG-TERM CURRENT USE OF INSULIN: Chronic | ICD-10-CM

## 2022-10-19 DIAGNOSIS — G62.9 NEUROPATHY: Chronic | ICD-10-CM

## 2022-10-19 DIAGNOSIS — E11.43 TYPE 2 DIABETES MELLITUS WITH DIABETIC AUTONOMIC NEUROPATHY, WITH LONG-TERM CURRENT USE OF INSULIN: Chronic | ICD-10-CM

## 2022-10-21 DIAGNOSIS — E11.43 TYPE 2 DIABETES MELLITUS WITH DIABETIC AUTONOMIC NEUROPATHY, WITH LONG-TERM CURRENT USE OF INSULIN: Chronic | ICD-10-CM

## 2022-10-21 DIAGNOSIS — Z79.4 TYPE 2 DIABETES MELLITUS WITH DIABETIC AUTONOMIC NEUROPATHY, WITH LONG-TERM CURRENT USE OF INSULIN: Chronic | ICD-10-CM

## 2022-10-21 DIAGNOSIS — G62.9 NEUROPATHY: Chronic | ICD-10-CM

## 2022-10-21 RX ORDER — GABAPENTIN 100 MG/1
CAPSULE ORAL
Qty: 60 CAPSULE | Refills: 0 | Status: SHIPPED | OUTPATIENT
Start: 2022-10-21 | End: 2022-11-21 | Stop reason: SDUPTHER

## 2022-10-21 RX ORDER — GABAPENTIN 100 MG/1
100 CAPSULE ORAL EVERY 12 HOURS SCHEDULED
Qty: 60 CAPSULE | Refills: 0 | OUTPATIENT
Start: 2022-10-21

## 2022-10-24 RX ORDER — INSULIN GLARGINE 100 [IU]/ML
INJECTION, SOLUTION SUBCUTANEOUS
Qty: 30 ML | Refills: 4 | Status: SHIPPED | OUTPATIENT
Start: 2022-10-24

## 2022-10-24 RX ORDER — HYDROCHLOROTHIAZIDE 12.5 MG/1
TABLET ORAL
Qty: 30 TABLET | Refills: 1 | Status: SHIPPED | OUTPATIENT
Start: 2022-10-24 | End: 2022-11-16

## 2022-10-26 DIAGNOSIS — D72.829 LEUKOCYTOSIS, UNSPECIFIED TYPE: Primary | ICD-10-CM

## 2022-10-26 RX ORDER — FLUTICASONE PROPIONATE 50 MCG
SPRAY, SUSPENSION (ML) NASAL
Qty: 16 ML | Refills: 0 | Status: SHIPPED | OUTPATIENT
Start: 2022-10-26 | End: 2022-11-18

## 2022-10-28 DIAGNOSIS — E53.8 CYANOCOBALAMIN DEFICIENCY: ICD-10-CM

## 2022-10-28 RX ORDER — CYANOCOBALAMIN 1000 UG/ML
1000 INJECTION, SOLUTION INTRAMUSCULAR; SUBCUTANEOUS
Qty: 1 ML | Refills: 2 | Status: SHIPPED | OUTPATIENT
Start: 2022-10-28 | End: 2023-01-17

## 2022-11-02 DIAGNOSIS — Z12.31 SCREENING MAMMOGRAM FOR BREAST CANCER: Primary | ICD-10-CM

## 2022-11-07 RX ORDER — INSULIN ASPART 100 [IU]/ML
INJECTION, SOLUTION INTRAVENOUS; SUBCUTANEOUS
Refills: 1 | OUTPATIENT
Start: 2022-11-07

## 2022-11-07 NOTE — TELEPHONE ENCOUNTER
Refill under Kimberly Ferguson, APRN. Pt has zandra RAMIREZ/Dolly Foss APRN. Insulin refill not appr dose and type of Novolog has been changed and refilled on 10/03/2022.

## 2022-11-11 ENCOUNTER — TELEPHONE (OUTPATIENT)
Dept: FAMILY MEDICINE CLINIC | Facility: CLINIC | Age: 62
End: 2022-11-11

## 2022-11-11 DIAGNOSIS — M54.41 CHRONIC RIGHT-SIDED LOW BACK PAIN WITH RIGHT-SIDED SCIATICA: ICD-10-CM

## 2022-11-11 DIAGNOSIS — G54.6 PHANTOM PAIN AFTER AMPUTATION OF LOWER EXTREMITY: Chronic | ICD-10-CM

## 2022-11-11 DIAGNOSIS — G89.29 CHRONIC RIGHT-SIDED LOW BACK PAIN WITH RIGHT-SIDED SCIATICA: ICD-10-CM

## 2022-11-11 RX ORDER — PEN NEEDLE, DIABETIC 31 GX5/16"
NEEDLE, DISPOSABLE MISCELLANEOUS
Qty: 150 EACH | Refills: 11 | Status: SHIPPED | OUTPATIENT
Start: 2022-11-11

## 2022-11-11 RX ORDER — HYDROCODONE BITARTRATE AND ACETAMINOPHEN 7.5; 325 MG/1; MG/1
1 TABLET ORAL EVERY 6 HOURS PRN
Qty: 120 TABLET | Refills: 0 | Status: SHIPPED | OUTPATIENT
Start: 2022-11-11 | End: 2022-12-07 | Stop reason: SDUPTHER

## 2022-11-11 NOTE — TELEPHONE ENCOUNTER
Insulin Pen Needle 31G X 8 MM [facility]    [id number] - [place], [place] - [address] [place] [address] - [phone]  - [phone] [address] [address]   Phone:  [phone]  Fax:  [phone]

## 2022-11-16 RX ORDER — HYDROCHLOROTHIAZIDE 12.5 MG/1
TABLET ORAL
Qty: 30 TABLET | Refills: 0 | Status: SHIPPED | OUTPATIENT
Start: 2022-11-16 | End: 2022-12-09

## 2022-11-16 RX ORDER — FUROSEMIDE 40 MG/1
TABLET ORAL
Qty: 30 TABLET | Refills: 0 | Status: SHIPPED | OUTPATIENT
Start: 2022-11-16 | End: 2023-01-18

## 2022-11-18 ENCOUNTER — OFFICE VISIT (OUTPATIENT)
Dept: ORTHOPEDIC SURGERY | Facility: CLINIC | Age: 62
End: 2022-11-18

## 2022-11-18 VITALS — BODY MASS INDEX: 38.83 KG/M2 | HEIGHT: 68 IN

## 2022-11-18 DIAGNOSIS — M25.511 ACUTE PAIN OF RIGHT SHOULDER: ICD-10-CM

## 2022-11-18 DIAGNOSIS — K21.9 GERD WITHOUT ESOPHAGITIS: ICD-10-CM

## 2022-11-18 DIAGNOSIS — G47.09 OTHER INSOMNIA: ICD-10-CM

## 2022-11-18 DIAGNOSIS — E11.65 TYPE 2 DIABETES MELLITUS WITH HYPERGLYCEMIA, WITH LONG-TERM CURRENT USE OF INSULIN: ICD-10-CM

## 2022-11-18 DIAGNOSIS — F41.1 GAD (GENERALIZED ANXIETY DISORDER): ICD-10-CM

## 2022-11-18 DIAGNOSIS — M75.101 ROTATOR CUFF SYNDROME, RIGHT: Primary | ICD-10-CM

## 2022-11-18 DIAGNOSIS — Z79.4 TYPE 2 DIABETES MELLITUS WITH HYPERGLYCEMIA, WITH LONG-TERM CURRENT USE OF INSULIN: ICD-10-CM

## 2022-11-18 DIAGNOSIS — M12.811 ROTATOR CUFF ARTHROPATHY, RIGHT: ICD-10-CM

## 2022-11-18 PROCEDURE — 99213 OFFICE O/P EST LOW 20 MIN: CPT | Performed by: ORTHOPAEDIC SURGERY

## 2022-11-18 RX ORDER — LANOLIN ALCOHOL/MO/W.PET/CERES
6 CREAM (GRAM) TOPICAL NIGHTLY
Qty: 60 TABLET | Refills: 2 | Status: SHIPPED | OUTPATIENT
Start: 2022-11-18 | End: 2023-01-06

## 2022-11-18 RX ORDER — FLUTICASONE PROPIONATE 50 MCG
SPRAY, SUSPENSION (ML) NASAL
Qty: 16 ML | Refills: 0 | Status: SHIPPED | OUTPATIENT
Start: 2022-11-18 | End: 2022-12-12

## 2022-11-18 RX ORDER — OMEPRAZOLE MAGNESIUM 20 MG
CAPSULE,DELAYED RELEASE (ENTERIC COATED) ORAL
Qty: 120 TABLET | Refills: 1 | OUTPATIENT
Start: 2022-11-18

## 2022-11-18 NOTE — PROGRESS NOTES
"Ariana Martinez is a 62 y.o. female returns for     Chief Complaint   Patient presents with   • Right Shoulder - Follow-up, Pain       HISTORY OF PRESENT ILLNESS:  Patient states that she has been taking meloxicam and pain is doing better.  No fevers or chills.  No numbness or tingling.     CONCURRENT MEDICAL HISTORY:    The following portions of the patient's history were reviewed and updated as appropriate: allergies, current medications, past family history, past medical history, past social history, past surgical history and problem list.     ROS  No fevers or chills.  No chest pain or shortness of air.  No GI or  disturbances.    PHYSICAL EXAMINATION:       Ht 172.7 cm (68\")   BMI 38.83 kg/m²     Physical Exam  Constitutional:       General: She is not in acute distress.     Appearance: Normal appearance.   Pulmonary:      Effort: Pulmonary effort is normal. No respiratory distress.   Neurological:      Mental Status: She is alert and oriented to person, place, and time.         Right Shoulder Exam     Comments:  Shoulder flexion 110 degrees.  She is able to place her hand on her head.  Abduction 90 to 100 degrees.  Good distal pulses and sensation.  Mild crepitus on exam.                  Three view right shoulder     HISTORY: Right shoulder pain     AP films with the humerus in internal and external rotation and  scapular Y view were obtained.     COMPARISON: Left shoulder February 5, 2018     FINDINGS:   No fracture or dislocation.  Hypertrophic change acromioclavicular joint.  No other osseous or articular abnormality.   Sternotomy.     IMPRESSION:  CONCLUSION:  Hypertrophic change acromioclavicular joint.     [id number]     Electronically signed by:  David Gamboa MD  7/22/2022 8:14 PM CDT  Workstation: [id number]      ASSESSMENT:    Diagnoses and all orders for this visit:    Rotator cuff syndrome, right    Acute pain of right shoulder    GERD without esophagitis    Type 2 diabetes mellitus with " hyperglycemia, with long-term current use of insulin (HCC)    Rotator cuff arthropathy, right    MAURICIO (generalized anxiety disorder)          PLAN    Long discussion was held the patient regarding further treatment options.  We discussed continued observation at this time.  We discussed the possibility of steroid injection and the possibility of using a steroid cream.  She does not want to proceed with surgical intervention at this time.    Return in about 6 weeks (around 12/30/2022) for recheck.    Rohan Lazo MD   (3) adequate

## 2022-11-21 DIAGNOSIS — E11.43 TYPE 2 DIABETES MELLITUS WITH DIABETIC AUTONOMIC NEUROPATHY, WITH LONG-TERM CURRENT USE OF INSULIN: Chronic | ICD-10-CM

## 2022-11-21 DIAGNOSIS — Z79.4 TYPE 2 DIABETES MELLITUS WITH DIABETIC AUTONOMIC NEUROPATHY, WITH LONG-TERM CURRENT USE OF INSULIN: Chronic | ICD-10-CM

## 2022-11-21 DIAGNOSIS — G62.9 NEUROPATHY: Chronic | ICD-10-CM

## 2022-11-21 NOTE — TELEPHONE ENCOUNTER
gabapentin (NEURONTIN) 100 MG capsule [[id number]    [id number] - [place], [place] - [address] [place] [address] - [phone] PH - [phone] [address] [address]   Phone:  [phone]  Fax:  [phone]

## 2022-11-23 RX ORDER — GABAPENTIN 100 MG/1
100 CAPSULE ORAL EVERY 12 HOURS
Qty: 60 CAPSULE | Refills: 0 | Status: SHIPPED | OUTPATIENT
Start: 2022-11-23 | End: 2023-01-17 | Stop reason: SDUPTHER

## 2022-11-28 ENCOUNTER — TELEPHONE (OUTPATIENT)
Dept: ORTHOPEDIC SURGERY | Facility: CLINIC | Age: 62
End: 2022-11-28

## 2022-11-29 DIAGNOSIS — R11.0 CHRONIC NAUSEA: Chronic | ICD-10-CM

## 2022-11-29 DIAGNOSIS — K31.84 DIABETIC GASTROPARESIS: Chronic | ICD-10-CM

## 2022-11-29 DIAGNOSIS — E11.43 DIABETIC GASTROPARESIS: Chronic | ICD-10-CM

## 2022-11-29 RX ORDER — METOCLOPRAMIDE 10 MG/1
10 TABLET ORAL 4 TIMES DAILY PRN
Qty: 120 TABLET | Refills: 0 | Status: SHIPPED | OUTPATIENT
Start: 2022-11-29

## 2022-12-01 DIAGNOSIS — M62.838 MUSCLE SPASM: ICD-10-CM

## 2022-12-01 RX ORDER — METHOCARBAMOL 500 MG/1
TABLET, FILM COATED ORAL
Qty: 180 TABLET | Refills: 1 | OUTPATIENT
Start: 2022-12-01

## 2022-12-06 NOTE — TELEPHONE ENCOUNTER
Let the patient know that the medicine has now been sent in.  Apparently, I thought I had done it previously but entered the medicine as a historical medicine rather than as a new prescription.  Let her know we are sorry for the delay and the confusion.  STELLA

## 2022-12-07 ENCOUNTER — OFFICE VISIT (OUTPATIENT)
Dept: FAMILY MEDICINE CLINIC | Facility: CLINIC | Age: 62
End: 2022-12-07

## 2022-12-07 ENCOUNTER — LAB (OUTPATIENT)
Dept: LAB | Facility: OTHER | Age: 62
End: 2022-12-07

## 2022-12-07 VITALS
HEIGHT: 68 IN | WEIGHT: 273 LBS | SYSTOLIC BLOOD PRESSURE: 138 MMHG | OXYGEN SATURATION: 97 % | TEMPERATURE: 96.9 F | DIASTOLIC BLOOD PRESSURE: 70 MMHG | BODY MASS INDEX: 41.37 KG/M2 | HEART RATE: 74 BPM

## 2022-12-07 DIAGNOSIS — G89.29 CHRONIC RIGHT-SIDED LOW BACK PAIN WITH RIGHT-SIDED SCIATICA: ICD-10-CM

## 2022-12-07 DIAGNOSIS — M25.422 ELBOW EFFUSION, LEFT: Primary | ICD-10-CM

## 2022-12-07 DIAGNOSIS — G54.6 PHANTOM PAIN AFTER AMPUTATION OF LOWER EXTREMITY: Chronic | ICD-10-CM

## 2022-12-07 DIAGNOSIS — Z51.81 MEDICATION MONITORING ENCOUNTER: Primary | ICD-10-CM

## 2022-12-07 DIAGNOSIS — M54.41 CHRONIC RIGHT-SIDED LOW BACK PAIN WITH RIGHT-SIDED SCIATICA: ICD-10-CM

## 2022-12-07 DIAGNOSIS — M15.9 PRIMARY OSTEOARTHRITIS INVOLVING MULTIPLE JOINTS: Chronic | ICD-10-CM

## 2022-12-07 DIAGNOSIS — Z79.899 LONG-TERM USE OF HIGH-RISK MEDICATION: ICD-10-CM

## 2022-12-07 PROCEDURE — 99213 OFFICE O/P EST LOW 20 MIN: CPT | Performed by: NURSE PRACTITIONER

## 2022-12-07 PROCEDURE — G0481 DRUG TEST DEF 8-14 CLASSES: HCPCS | Performed by: NURSE PRACTITIONER

## 2022-12-07 PROCEDURE — 80307 DRUG TEST PRSMV CHEM ANLYZR: CPT | Performed by: NURSE PRACTITIONER

## 2022-12-07 RX ORDER — MELOXICAM 15 MG/1
TABLET ORAL
Qty: 30 TABLET | Refills: 3 | Status: SHIPPED | OUTPATIENT
Start: 2022-12-07

## 2022-12-07 RX ORDER — HYDROCODONE BITARTRATE AND ACETAMINOPHEN 7.5; 325 MG/1; MG/1
1 TABLET ORAL EVERY 6 HOURS PRN
Qty: 120 TABLET | Refills: 0 | Status: SHIPPED | OUTPATIENT
Start: 2022-12-07 | End: 2023-01-11 | Stop reason: SDUPTHER

## 2022-12-07 RX ORDER — ONDANSETRON HYDROCHLORIDE 8 MG/1
8 TABLET, FILM COATED ORAL EVERY 8 HOURS PRN
Qty: 18 TABLET | Refills: 1 | Status: SHIPPED | OUTPATIENT
Start: 2022-12-07 | End: 2023-03-27 | Stop reason: SDUPTHER

## 2022-12-07 RX ORDER — MONTELUKAST SODIUM 10 MG/1
10 TABLET ORAL NIGHTLY
COMMUNITY
Start: 2022-11-08 | End: 2023-01-06

## 2022-12-07 NOTE — PROGRESS NOTES
Subjective   Ariana Martinez is a 62 y.o. female who presents to the office for chronic low back pain resulting from osteoarthritis follow-up, has been taking Norco for this issue.  Denies issue from taking this medication and is compliant with lab testing.  Does complain of LEFT elbow swelling.    History of Present Illness     The following portions of the patient's history were reviewed and updated as appropriate: allergies, current medications, past family history, past medical history, past social history, past surgical history and problem list.    Review of Systems   Constitutional: Negative for chills, fatigue and fever.   HENT: Negative for congestion, sneezing, sore throat and trouble swallowing.    Eyes: Negative for visual disturbance.   Respiratory: Negative for cough, chest tightness, shortness of breath and wheezing.    Cardiovascular: Negative for chest pain, palpitations and leg swelling.   Gastrointestinal: Negative for abdominal pain, constipation, diarrhea, nausea and vomiting.   Genitourinary: Negative for dysuria, frequency and urgency.   Musculoskeletal: Positive for arthralgias and back pain. Negative for neck pain.   Skin: Negative for rash.   Neurological: Negative for dizziness, weakness and headaches.   Psychiatric/Behavioral:        In the past two weeks the pt has not felt down, depressed, hopeless or lost interest in doing things   All other systems reviewed and are negative.      Objective   Physical Exam  Vitals and nursing note reviewed.   Constitutional:       General: She is not in acute distress.     Appearance: She is well-developed. She is ill-appearing (chronically).   HENT:      Head: Normocephalic and atraumatic.      Right Ear: External ear normal.      Left Ear: External ear normal.      Nose: Nose normal.   Eyes:      General: No scleral icterus.        Right eye: No discharge.         Left eye: No discharge.      Conjunctiva/sclera: Conjunctivae normal.      Pupils:  Pupils are equal, round, and reactive to light.   Neck:      Thyroid: No thyromegaly.   Cardiovascular:      Rate and Rhythm: Normal rate and regular rhythm.      Heart sounds: Normal heart sounds. No murmur heard.    No friction rub. No gallop.   Pulmonary:      Effort: Pulmonary effort is normal. No respiratory distress.      Breath sounds: Normal breath sounds. No wheezing or rales.   Abdominal:      General: Bowel sounds are normal. There is no distension.      Palpations: Abdomen is soft.      Tenderness: There is no abdominal tenderness. There is no guarding or rebound.   Musculoskeletal:         General: Normal range of motion.      Left elbow: Effusion present.      Cervical back: Normal range of motion and neck supple.      Comments: No streaking   Lymphadenopathy:      Cervical: No cervical adenopathy.   Skin:     General: Skin is warm and dry.      Capillary Refill: Capillary refill takes less than 2 seconds.      Findings: No rash.   Neurological:      Mental Status: She is alert and oriented to person, place, and time.      GCS: GCS eye subscore is 4. GCS verbal subscore is 5. GCS motor subscore is 6.      Cranial Nerves: Cranial nerves 2-12 are intact. No cranial nerve deficit.   Psychiatric:         Behavior: Behavior normal. Behavior is cooperative.         Thought Content: Thought content normal.         Judgment: Judgment normal.         Assessment & Plan   Diagnoses and all orders for this visit:    1. Elbow effusion, left (Primary)  -     XR Elbow 3+ View Left  -     Ambulatory Referral to Orthopedic Surgery    2. Phantom pain after amputation of lower extremity (HCC)  -     HYDROcodone-acetaminophen (NORCO) 7.5-325 MG per tablet; Take 1 tablet by mouth Every 6 (Six) Hours As Needed for Moderate Pain.  Dispense: 120 tablet; Refill: 0    3. Chronic right-sided low back pain with right-sided sciatica  -     HYDROcodone-acetaminophen (NORCO) 7.5-325 MG per tablet; Take 1 tablet by mouth Every 6  (Six) Hours As Needed for Moderate Pain.  Dispense: 120 tablet; Refill: 0    4. Primary osteoarthritis involving multiple joints    Other orders  -     ondansetron (ZOFRAN) 8 MG tablet; Take 1 tablet by mouth Every 8 (Eight) Hours As Needed for Nausea or Vomiting.  Dispense: 18 tablet; Refill: 1    Encouraged to continue with medications as ordered.  Will send off to ortho for their expert surveillance.       Patient understands the risks associated with this controlled medication, including tolerance and addiction.  Patient also agrees to only obtain this medication from me, and not from a another provider, unless that provider is covering for me in my absence.  Patient also agrees to be compliant in dosing, and not self adjust the dose of medication.  A signed controlled substance agreement is on file, and the patient has received a controlled substance education sheet at this a previous visit.  The patient has also signed a consent for treatment with a controlled substance as per [facility] policy. LESTER was obtained, reviewed # [id number]          This document has been electronically signed by BEBE Rob on December 22, 2022 06:08 CST

## 2022-12-09 LAB — GABAPENTIN UR-MCNC: 74.1 UG/ML

## 2022-12-09 RX ORDER — HYDROCHLOROTHIAZIDE 12.5 MG/1
TABLET ORAL
Qty: 30 TABLET | Refills: 1 | Status: SHIPPED | OUTPATIENT
Start: 2022-12-09 | End: 2023-01-03

## 2022-12-12 RX ORDER — ISOSORBIDE MONONITRATE 30 MG/1
TABLET, EXTENDED RELEASE ORAL
Qty: 30 TABLET | Refills: 0 | OUTPATIENT
Start: 2022-12-12

## 2022-12-12 RX ORDER — FLUTICASONE PROPIONATE 50 MCG
SPRAY, SUSPENSION (ML) NASAL
Qty: 16 ML | Refills: 1 | Status: SHIPPED | OUTPATIENT
Start: 2022-12-12 | End: 2023-01-03

## 2022-12-14 DIAGNOSIS — F33.1 DEPRESSION, MAJOR, RECURRENT, MODERATE: Chronic | ICD-10-CM

## 2022-12-14 DIAGNOSIS — F41.1 GENERALIZED ANXIETY DISORDER: Chronic | ICD-10-CM

## 2022-12-14 RX ORDER — VENLAFAXINE HYDROCHLORIDE 75 MG/1
75 CAPSULE, EXTENDED RELEASE ORAL
Qty: 90 CAPSULE | Refills: 0 | Status: SHIPPED | OUTPATIENT
Start: 2022-12-14 | End: 2023-03-10 | Stop reason: SDUPTHER

## 2022-12-15 LAB
6MAM UR QL CFM: NEGATIVE
6MAM/CREAT UR: NOT DETECTED NG/MG CREAT
7AMINOCLONAZEPAM/CREAT UR: NOT DETECTED NG/MG CREAT
A-OH ALPRAZ/CREAT UR: NOT DETECTED NG/MG CREAT
A-OH-TRIAZOLAM/CREAT UR CFM: NOT DETECTED NG/MG CREAT
ALFENTANIL/CREAT UR CFM: NOT DETECTED NG/MG CREAT
ALPHA-HYDROXYMIDAZOLAM, URINE: NOT DETECTED NG/MG CREAT
ALPRAZ/CREAT UR CFM: NOT DETECTED NG/MG CREAT
AMOBARBITAL UR QL CFM: NOT DETECTED
AMPHET/CREAT UR: NOT DETECTED NG/MG CREAT
AMPHETAMINES UR QL CFM: NEGATIVE
BARBITAL UR QL CFM: NOT DETECTED
BARBITURATES UR QL CFM: NEGATIVE
BENZODIAZ UR QL CFM: NEGATIVE
BUPRENORPHINE UR QL CFM: NEGATIVE
BUPRENORPHINE/CREAT UR: NOT DETECTED NG/MG CREAT
BUTABARBITAL UR QL CFM: NOT DETECTED
BUTALBITAL UR QL CFM: NOT DETECTED
BZE/CREAT UR: NOT DETECTED NG/MG CREAT
CANNABINOIDS UR QL CFM: NEGATIVE
CARBOXYTHC/CREAT UR: NOT DETECTED NG/MG CREAT
CLONAZEPAM/CREAT UR CFM: NOT DETECTED NG/MG CREAT
COCAETHYLENE/CREAT UR CFM: NOT DETECTED NG/MG CREAT
COCAINE UR QL CFM: NEGATIVE
COCAINE/CREAT UR CFM: NOT DETECTED NG/MG CREAT
CODEINE/CREAT UR: NOT DETECTED NG/MG CREAT
CREAT UR-MCNC: 69 MG/DL
DESALKYLFLURAZ/CREAT UR: NOT DETECTED NG/MG CREAT
DESMETHYLFLUNITRAZEPAM: NOT DETECTED NG/MG CREAT
DHC/CREAT UR: 281 NG/MG CREAT
DIAZEPAM/CREAT UR: NOT DETECTED NG/MG CREAT
DRUGS UR: NORMAL
EDDP/CREAT UR: NOT DETECTED NG/MG CREAT
ETHANOL UR CFM-MCNC: 0.11 G/DL
ETHANOL UR QL CFM: NORMAL
FENTANYL UR QL CFM: NEGATIVE
FENTANYL/CREAT UR: NOT DETECTED NG/MG CREAT
FLUNITRAZEPAM UR QL CFM: NOT DETECTED NG/MG CREAT
HYDROCODONE/CREAT UR: 1264 NG/MG CREAT
HYDROMORPHONE/CREAT UR: 171 NG/MG CREAT
LORAZEPAM/CREAT UR: NOT DETECTED NG/MG CREAT
MDA/CREAT UR: NOT DETECTED NG/MG CREAT
MDMA/CREAT UR: NOT DETECTED NG/MG CREAT
MEPHOBARBITAL UR QL CFM: NOT DETECTED
METHADONE UR QL CFM: NEGATIVE
METHADONE/CREAT UR: NOT DETECTED NG/MG CREAT
METHAMPHET/CREAT UR: NOT DETECTED NG/MG CREAT
MIDAZOLAM/CREAT UR CFM: NOT DETECTED NG/MG CREAT
MORPHINE/CREAT UR: NOT DETECTED NG/MG CREAT
N-NORTRAMADOL/CREAT UR CFM: NOT DETECTED NG/MG CREAT
NARCOTICS UR: NEGATIVE
NORBUPRENORPHINE/CREAT UR: NOT DETECTED NG/MG CREAT
NORCODEINE/CREAT UR CFM: NOT DETECTED NG/MG CREAT
NORDIAZEPAM/CREAT UR: NOT DETECTED NG/MG CREAT
NORFENTANYL/CREAT UR: NOT DETECTED NG/MG CREAT
NORHYDROCODONE/CREAT UR: 333 NG/MG CREAT
NORMORPHINE UR-MCNC: NOT DETECTED NG/MG CREAT
NOROXYCODONE/CREAT UR: NOT DETECTED NG/MG CREAT
NOROXYMORPHONE/CREAT UR CFM: NOT DETECTED NG/MG CREAT
O-NORTRAMADOL UR CFM-MCNC: NOT DETECTED NG/MG CREAT
OPIATES UR QL CFM: NORMAL
OXAZEPAM/CREAT UR: NOT DETECTED NG/MG CREAT
OXYCODONE UR QL CFM: NEGATIVE
OXYCODONE/CREAT UR: NOT DETECTED NG/MG CREAT
OXYMORPHONE/CREAT UR: NOT DETECTED NG/MG CREAT
PENTOBARB UR QL CFM: NOT DETECTED
PHENOBARB UR QL CFM: NOT DETECTED
SECOBARBITAL UR QL CFM: NOT DETECTED
SERVICE CMNT 02-IMP: NORMAL
SUFENTANIL/CREAT UR CFM: NOT DETECTED NG/MG CREAT
TAPENTADOL UR QL CFM: NEGATIVE
TAPENTADOL/CREAT UR: NOT DETECTED NG/MG CREAT
TEMAZEPAM/CREAT UR: NOT DETECTED NG/MG CREAT
THIOPENTAL UR QL CFM: NOT DETECTED
TRAMADOL UR QL CFM: NOT DETECTED NG/MG CREAT

## 2022-12-17 DIAGNOSIS — I10 ESSENTIAL HYPERTENSION: Chronic | ICD-10-CM

## 2022-12-19 RX ORDER — AMLODIPINE BESYLATE 5 MG/1
TABLET ORAL
Qty: 90 TABLET | Refills: 1 | OUTPATIENT
Start: 2022-12-19

## 2022-12-20 ENCOUNTER — OFFICE VISIT (OUTPATIENT)
Dept: ONCOLOGY | Facility: CLINIC | Age: 62
End: 2022-12-20

## 2022-12-20 ENCOUNTER — LAB (OUTPATIENT)
Dept: ONCOLOGY | Facility: HOSPITAL | Age: 62
End: 2022-12-20

## 2022-12-20 VITALS
OXYGEN SATURATION: 96 % | BODY MASS INDEX: 40.29 KG/M2 | WEIGHT: 265 LBS | SYSTOLIC BLOOD PRESSURE: 136 MMHG | DIASTOLIC BLOOD PRESSURE: 59 MMHG | HEART RATE: 70 BPM | RESPIRATION RATE: 18 BRPM

## 2022-12-20 DIAGNOSIS — D50.0 IRON DEFICIENCY ANEMIA DUE TO CHRONIC BLOOD LOSS: ICD-10-CM

## 2022-12-20 DIAGNOSIS — D50.0 IRON DEFICIENCY ANEMIA DUE TO CHRONIC BLOOD LOSS: Primary | Chronic | ICD-10-CM

## 2022-12-20 LAB
BASOPHILS # BLD AUTO: 0.05 10*3/MM3 (ref 0–0.2)
BASOPHILS NFR BLD AUTO: 0.4 % (ref 0–1.5)
DEPRECATED RDW RBC AUTO: 41.6 FL (ref 37–54)
EOSINOPHIL # BLD AUTO: 0.38 10*3/MM3 (ref 0–0.4)
EOSINOPHIL NFR BLD AUTO: 2.8 % (ref 0.3–6.2)
ERYTHROCYTE [DISTWIDTH] IN BLOOD BY AUTOMATED COUNT: 13.2 % (ref 12.3–15.4)
FERRITIN SERPL-MCNC: 66.93 NG/ML (ref 13–150)
HCT VFR BLD AUTO: 38 % (ref 34–46.6)
HGB BLD-MCNC: 12.7 G/DL (ref 12–15.9)
HOLD SPECIMEN: NORMAL
IMM GRANULOCYTES # BLD AUTO: 0.04 10*3/MM3 (ref 0–0.05)
IMM GRANULOCYTES NFR BLD AUTO: 0.3 % (ref 0–0.5)
IRON 24H UR-MRATE: 59 MCG/DL (ref 37–145)
IRON SATN MFR SERPL: 16 % (ref 20–50)
LYMPHOCYTES # BLD AUTO: 2.59 10*3/MM3 (ref 0.7–3.1)
LYMPHOCYTES NFR BLD AUTO: 18.9 % (ref 19.6–45.3)
MCH RBC QN AUTO: 29.1 PG (ref 26.6–33)
MCHC RBC AUTO-ENTMCNC: 33.4 G/DL (ref 31.5–35.7)
MCV RBC AUTO: 87.2 FL (ref 79–97)
MONOCYTES # BLD AUTO: 0.73 10*3/MM3 (ref 0.1–0.9)
MONOCYTES NFR BLD AUTO: 5.3 % (ref 5–12)
NEUTROPHILS NFR BLD AUTO: 72.3 % (ref 42.7–76)
NEUTROPHILS NFR BLD AUTO: 9.93 10*3/MM3 (ref 1.7–7)
NRBC BLD AUTO-RTO: 0 /100 WBC (ref 0–0.2)
PLATELET # BLD AUTO: 406 10*3/MM3 (ref 140–450)
PMV BLD AUTO: 9.3 FL (ref 6–12)
RBC # BLD AUTO: 4.36 10*6/MM3 (ref 3.77–5.28)
TIBC SERPL-MCNC: 367 MCG/DL (ref 298–536)
TRANSFERRIN SERPL-MCNC: 246 MG/DL (ref 200–360)
WBC NRBC COR # BLD: 13.72 10*3/MM3 (ref 3.4–10.8)

## 2022-12-20 PROCEDURE — 82728 ASSAY OF FERRITIN: CPT

## 2022-12-20 PROCEDURE — 99214 OFFICE O/P EST MOD 30 MIN: CPT | Performed by: NURSE PRACTITIONER

## 2022-12-20 PROCEDURE — 83540 ASSAY OF IRON: CPT

## 2022-12-20 PROCEDURE — 84466 ASSAY OF TRANSFERRIN: CPT

## 2022-12-20 PROCEDURE — 85025 COMPLETE CBC W/AUTO DIFF WBC: CPT

## 2022-12-20 PROCEDURE — G0463 HOSPITAL OUTPT CLINIC VISIT: HCPCS | Performed by: NURSE PRACTITIONER

## 2022-12-20 PROCEDURE — 36415 COLL VENOUS BLD VENIPUNCTURE: CPT

## 2022-12-20 NOTE — PROGRESS NOTES
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  Due to iron deficiency and inability to tolerate iron by mouth, patient has received IV iron infusions in the past; most recently she received IV iron in 2020.  She is receiving IM Monthly B-12 injections at home.  She denies any bleeding; no recent fever, chills or infection.      Past Medical History, Past Surgical History, Social History, Family History have been reviewed and are without significant changes except as mentioned.    Review of Systems   A comprehensive 14 point review of systems was performed and was negative except as mentioned.    Medications:  The current medication list was reviewed in the EMR    ALLERGIES:    Allergies   Allergen Reactions   • Seroquel [Quetiapine] Anaphylaxis   • Avandia [Rosiglitazone] Swelling   • Morphine And Related Hallucinations   • Oxycodone Hallucinations       Objective      Vitals:    12/20/22 1316   BP: 136/59   Pulse: 70   Resp: 18   SpO2: 96%   Weight: 120 kg (265 lb)   PainSc: 0-No pain     Current Status 4/22/2021   ECOG score 4       Physical Exam  General: alert and oriented no acute distress  Lungs;normal breath sounds  Card;RRR  Ext; no edema    RECENT LABS:  Glucose   Date Value Ref Range Status   09/29/2022 95 65 - 99 mg/dL Final     Glucose, Arterial   Date Value Ref Range Status   10/14/2017 155 mmol/L Final     Sodium   Date Value Ref Range Status   09/29/2022 138 136 - 145 mmol/L Final     Potassium   Date Value Ref Range Status   09/29/2022 4.3 3.5 - 5.2 mmol/L Final   03/18/2021 3.6  3.5 - 5.4 MMOL/L Final     CO2   Date Value Ref Range Status   09/29/2022 26.0 22.0 - 29.0 mmol/L Final     Chloride   Date Value Ref Range Status   09/29/2022 99 98 - 107 mmol/L Final     Anion Gap   Date Value Ref Range Status   09/29/2022 13.0 5.0 - 15.0 mmol/L Final     Creatinine   Date Value Ref Range Status   09/29/2022 1.10 (H) 0.57 - 1.00 mg/dL Final     BUN   Date Value Ref Range Status   09/29/2022 17 8 - 23 mg/dL Final     BUN/Creatinine Ratio   Date Value Ref Range Status   09/29/2022 15.5 7.0 - 25.0 Final     Calcium   Date Value Ref Range Status   09/29/2022 9.8 8.6 - 10.5 mg/dL Final     eGFR Non  Amer   Date Value Ref Range Status   02/07/2022 45 (L) >60 mL/min/1.73 Final     Alkaline Phosphatase   Date Value Ref Range Status   09/29/2022 119 (H) 39 - 117 U/L Final     Total Protein   Date Value Ref Range Status   09/29/2022 7.7 6.0 - 8.5 g/dL Final     ALT (SGPT)   Date Value Ref Range Status   09/29/2022 14 1 - 33 U/L Final     AST (SGOT)   Date Value Ref Range Status   09/29/2022 29 1 - 32 U/L Final     Total Bilirubin   Date Value Ref Range Status   09/29/2022 0.4 0.0 - 1.2 mg/dL Final     Albumin   Date Value Ref Range Status   09/29/2022 4.10 3.50 - 5.20 g/dL Final     Globulin   Date Value Ref Range Status   09/29/2022 3.6 gm/dL Final     Lab Results   Component Value Date    WBC 13.72 (H) 12/20/2022    HGB 12.7 12/20/2022    HCT 38.0 12/20/2022    MCV 87.2 12/20/2022     12/20/2022     Lab Results   Component Value Date    NEUTROABS 9.93 (H) 12/20/2022    IRON 59 12/20/2022    IRON 51 08/29/2022    IRON 47 04/25/2022    TIBC 367 12/20/2022    TIBC 396 08/29/2022    TIBC 311 04/25/2022    LABIRON 16 (L) 12/20/2022    LABIRON 13 (L) 08/29/2022    LABIRON 15 (L) 04/25/2022    FERRITIN 66.93 12/20/2022    FERRITIN 97.78 08/29/2022    FERRITIN 156.00 (H) 04/25/2022    TFTLIOSC28 639 09/29/2022    CEWSBGHZ41 516 08/29/2022    RMLTAAJU66 >2,000 (H) 06/01/2022    FOLATE >20.00  "2022    FOLATE >20.00 2022    FOLATE >20.00 2022     Lab Results   Component Value Date    REFLABREPO  06/10/2022     [facility]  [address] [place] [address]  Phone: [phone] or [phone]  Fax: [phone]  Landen Baker M.D., Medical Director    PATIENT NAME                                     LABORATORY NO.  [id number]   CHIQUITA BAILEY.                              [id number]  [id number]                                 AGE                    SEX   SSN              CLIENT REF #  [facility]                   62        1960      F     [id number]      [id number]    [place]                               REQUESTING M.D.           ATTENDING M.D.         COPY TO[address]  DATE COLLECTED            DATE RECEIVED          DATE REPORTED  06/10/2022                06/10/2022             2022    DIAGNOSIS:  A.     GASTRIC ANTRUM, BIOPSY:  Gastric fundic type mucosa with mild chronic inactive gastritis  Negative for intestinal metaplasia or dysplasia  No Helicobacter pylori-like organisms seen  B.     GE JUNCTION, ULCER:  Squamous mucosa with features suggestive of reflux esophagitis  Negative for glandular type mucosa, intestinal metaplasia, or dysplasia  Negative for specific microorganisms    DELIA    CLINICAL HISTORY:  Chest pain, unspecified type    SPECIMENS RECEIVED:  A.    GASTRIC ANTRUM, BIOPSY  B.    GE JUNCTION, ULCER    MICROSCOPIC DESCRIPTION:  Tissue blocks are prepared and slides are examined microscopically on all  specimens. See diagnosis for details.    Professional interpretation rendered by Ben Barahona M.D., F.C.A.P. at [facility], 4th aspect, [address] [place] [address].    GROSS DESCRIPTION:  A.    Specimen is received in 1 formalin filled container labeled \"gastric " "antrum\"  and consists of 2 portions of tan soft tissue measuring 0.4 x 0.2 x 0.2 cm.  The specimen is submitted entirely in 1 cassette.  QUINTIN  B.    Specimen is received in 1 formalin filled container labeled \"ulcer EG  junction\" and consists of a single portion of tan soft tissue measuring 0.4 x  0.3 x 0.2 cm.  The specimen is submitted entirely in 1 cassette.    REVIEWED, DIAGNOSED AND ELECTRONICALLY  SIGNED BY:    Ben Barahona M.D., IRAISA.P.  CPT CODES:  88305x2           PATHOLOGY:  * Cannot find OR log *         RADIOLOGY DATA :  No radiology results for the last 7 days        Assessment & Plan     1.  Thrombocytosis:  - Patient has intermittent thrombocytosis since 2015.  -Labs reviewed today and platelet count is normal at 406,000.  -Extensive work-up including Tony 2 mutation with exon 12, CALR mutation, MPL mutation done on September 30, 2019 is negative.  - At this point will continue with clinical monitoring.  Will ask patient to return to clinic in 4 months with repeat CBC and anemia work-up to be done     2.  Leukocytosis:  - Patient has persistent leukocytosis since 2015.  -White blood cell count is 13,000 with predominant neutrophils of differential..  CALR mutation, MPL mutation, Tony 2 mutation including exon 12 were negative.  At this point will continue with clinical monitoring.  - If patient has any worsening leukocytosis or thrombocytosis in future, she will need bone marrow biopsy which was discussed with patient.     3.  Iron deficiency from possible GI blood loss; received multiple doses of IV iron in past; last dose was in 2020.  -labs reviewed today; Hgb is 12.7 with stable iron stores.  -continue to monitor iron stores and repeat labs again in 6 mos.               PHQ-9 Total Score: 0     Ariana Martinez reports a pain score of 0.  Given her pain assessment as noted, treatment options were discussed and the following options were decided upon as a follow-up plan to address the patient's " pain: no pain today.         Teressa Hammond, APRN  12/20/2022  13:56 CST        Part of this note may be an electronic transcription/translation of spoken language to printed text using the Dragon Dictation System.          CC:

## 2022-12-21 ENCOUNTER — OFFICE VISIT (OUTPATIENT)
Dept: ORTHOPEDIC SURGERY | Facility: CLINIC | Age: 62
End: 2022-12-21

## 2022-12-21 VITALS — WEIGHT: 265 LBS | BODY MASS INDEX: 40.16 KG/M2 | HEIGHT: 68 IN

## 2022-12-21 DIAGNOSIS — M25.522 LEFT ELBOW PAIN: Primary | ICD-10-CM

## 2022-12-21 DIAGNOSIS — M70.22 OLECRANON BURSITIS, LEFT ELBOW: ICD-10-CM

## 2022-12-21 PROCEDURE — 99213 OFFICE O/P EST LOW 20 MIN: CPT | Performed by: NURSE PRACTITIONER

## 2022-12-21 PROCEDURE — 20605 DRAIN/INJ JOINT/BURSA W/O US: CPT | Performed by: NURSE PRACTITIONER

## 2022-12-21 RX ORDER — LIDOCAINE HYDROCHLORIDE 10 MG/ML
0.5 INJECTION, SOLUTION INFILTRATION; PERINEURAL
Status: COMPLETED | OUTPATIENT
Start: 2022-12-21 | End: 2022-12-21

## 2022-12-21 RX ORDER — ISOSORBIDE MONONITRATE 30 MG/1
TABLET, EXTENDED RELEASE ORAL
Qty: 30 TABLET | Refills: 0 | Status: SHIPPED | OUTPATIENT
Start: 2022-12-21 | End: 2023-01-13

## 2022-12-21 RX ORDER — TRIAMCINOLONE ACETONIDE 40 MG/ML
20 INJECTION, SUSPENSION INTRA-ARTICULAR; INTRAMUSCULAR
Status: COMPLETED | OUTPATIENT
Start: 2022-12-21 | End: 2022-12-21

## 2022-12-21 RX ADMIN — TRIAMCINOLONE ACETONIDE 20 MG: 40 INJECTION, SUSPENSION INTRA-ARTICULAR; INTRAMUSCULAR at 08:36

## 2022-12-21 RX ADMIN — LIDOCAINE HYDROCHLORIDE 0.5 ML: 10 INJECTION, SOLUTION INFILTRATION; PERINEURAL at 08:36

## 2022-12-21 NOTE — TELEPHONE ENCOUNTER
gabapentin (NEURONTIN) 100 MG capsule     [facility]/pharmacy #[id number] - [place], [place] - [address] [place] [address] - [phone] PH - [phone] [address] [address]   Phone:  [phone]  Fax:  [phone]

## 2022-12-21 NOTE — PROGRESS NOTES
Ariana Martinez is a 62 y.o. female   Primary provider:  Dolly Foss APRN       Chief Complaint   Patient presents with   • Left Elbow - Pain       HISTORY OF PRESENT ILLNESS:    Ms. Martinez is a 62-year-old female who presents today with complaints of left elbow pain and swelling that started around 6 weeks ago.  She reports pain is mild and intermittent.  Pain is an aching pain.  Pain is associated with swelling around her olecranon.  No fever, nausea, vomiting.  She has tried OTC medications without relief of symptoms.  No injuries or traumas.  No burning, tingling, numbness.      Pain  This is a new problem. The current episode started 1 to 4 weeks ago. The problem occurs intermittently. The problem has been gradually worsening. Associated symptoms include arthralgias and joint swelling. Nothing aggravates the symptoms. She has tried acetaminophen for the symptoms. The treatment provided mild relief.        CONCURRENT MEDICAL HISTORY:    Past Medical History:   Diagnosis Date   • Acute bacterial endocarditis 3/13/2021   • Angina, class IV ([facility])    • Anxiety    • Anxiety and depression    • Arthritis    • Benign paroxysmal positional vertigo    • Bladder disorder     has bladder stimulator   • Carpal tunnel syndrome    • CHF (congestive heart failure) ([facility])    • Chronic pain    • Coronary atherosclerosis     hx CABG 2005.  is followed by Dr Kwon   • Depression    • Diabetes mellitus ([facility])     Type 2, controlled   • Diabetic polyneuropathy ([facility])    • Disease of thyroid gland    • Elevated cholesterol    • Female stress incontinence    • Foot pain, left    • Full dentures    • Gastroparesis    • GERD (gastroesophageal reflux disease)    • Hyperlipidemia    • Hypertension    • Low back pain    • Malaise and fatigue    • Multiple joint pain    • Obesity     Refuses to be weighed   • Occlusion and stenosis of bilateral carotid arteries 6/18/2021   • Otalgia     Both   • Perforation of tympanic membrane      Left   • Postoperative wound infection    • Vitamin D deficiency    • Wears glasses     reading       Allergies   Allergen Reactions   • Seroquel [Quetiapine] Anaphylaxis   • Avandia [Rosiglitazone] Swelling   • Morphine And Related Hallucinations   • Oxycodone Hallucinations         Current Outpatient Medications:   •  amLODIPine (NORVASC) 5 MG tablet, Take 1 tablet by mouth Daily., Disp: 90 tablet, Rfl: 1  •  ARIPiprazole (ABILIFY) 5 MG tablet, Take 1 tablet by mouth Daily., Disp: 90 tablet, Rfl: 1  •  aspirin  MG tablet, Take 1 tablet by mouth Daily. (Patient taking differently: Take 81 mg by mouth Daily.), Disp: 30 tablet, Rfl: 0  •  atorvastatin (LIPITOR) 80 MG tablet, Take 1 tablet by mouth Daily., Disp: 90 tablet, Rfl: 1  •  B-D ULTRAFINE III SHORT PEN 31G X 8 MM misc, USE TO INJECT 5 TIMES A DAY, Disp: 150 each, Rfl: 11  •  BD SHARPS CONTAINER HOME misc, 1 each Take As Directed., Disp: 1 each, Rfl: 0  •  Blood Glucose Monitoring Suppl (ONE TOUCH ULTRA MINI) w/Device kit, Patient will need the Accu-Check Avitio meter, Disp: 1 each, Rfl: 0  •  Calcium Citrate-Vitamin D 250-200 MG-UNIT tablet, Take 2 tablets by mouth 2 (two) times a day., Disp: , Rfl:   •  clopidogrel (PLAVIX) 75 MG tablet, Take 1 tablet by mouth Daily., Disp: 90 tablet, Rfl: 0  •  cyanocobalamin 1000 MCG/ML injection, Inject 1 mL into the appropriate muscle as directed by prescriber Every 28 (Twenty-Eight) Days., Disp: 1 mL, Rfl: 2  •  Diclofenac Sodium (VOLTAREN) 1 % gel gel, Apply 4 g topically to the appropriate area as directed 2 (Two) Times a Day. Small amount to affected area, Disp: 100 g, Rfl: 0  •  fluticasone (FLONASE) 50 MCG/ACT nasal spray, INSTILL 2 SPRAYS IN EACH NOSTRIL AS DIRECTED BY PROVIDER DAILY, Disp: 16 mL, Rfl: 1  •  folic acid (FOLVITE) 1 MG tablet, TAKE 1 TABLET BY MOUTH EVERY DAY, Disp: 90 tablet, Rfl: 1  •  furosemide (LASIX) 40 MG tablet, TAKE 1 TABLET BY MOUTH EVERY DAY, Disp: 30 tablet, Rfl: 0  •  gabapentin  (NEURONTIN) 100 MG capsule, Take 1 capsule by mouth Every 12 (Twelve) Hours., Disp: 60 capsule, Rfl: 0  •  glucose blood (Accu-Chek Guide) test strip, 1 each by Other route 4 (Four) Times a Day. To test blood sugar 4X daily. For  Dx E11.65, Disp: 600 each, Rfl: 1  •  glucose monitor monitoring kit, 1 each Daily. accucheck eve meter, E11.9, Disp: 1 each, Rfl: 0  •  hydroCHLOROthiazide (HYDRODIURIL) 12.5 MG tablet, TAKE 1 TABLET BY MOUTH EVERY DAY, Disp: 30 tablet, Rfl: 1  •  HYDROcodone-acetaminophen (NORCO) 7.5-325 MG per tablet, Take 1 tablet by mouth Every 6 (Six) Hours As Needed for Moderate Pain., Disp: 120 tablet, Rfl: 0  •  insulin aspart (NovoLOG FlexPen) 100 UNIT/ML solution pen-injector sc pen, Inject up to 45 units  3 TIMES A DAY BEFORE MEALS, Disp: 90 mL, Rfl: 11  •  Insulin Glargine (BASAGLAR KWIKPEN) 100 UNIT/ML injection pen, Inject 40 units into the appropriate area every morning and 35 units into the appropriate area every night, Disp: 30 mL, Rfl: 4  •  Insulin Pen Needle 31G X 8 MM misc, Use up to 4 (four) times daily with insulin as directed., Disp: 100 each, Rfl: 0  •  isosorbide mononitrate (IMDUR) 30 MG 24 hr tablet, TAKE 1 TABLET BY MOUTH EVERY DAY, Disp: 30 tablet, Rfl: 0  •  Lancets (accu-chek soft touch) lancets, As directed, Disp: 100 each, Rfl: 12  •  levothyroxine (SYNTHROID, LEVOTHROID) 25 MCG tablet, Take 1 tablet by mouth Daily., Disp: 30 tablet, Rfl: 11  •  meclizine (ANTIVERT) 25 MG tablet, Take 1 tablet by mouth 3 (Three) Times a Day As Needed for dizziness., Disp: 90 tablet, Rfl: 6  •  melatonin 3 MG tablet, Take 2 tablets by mouth Every Night., Disp: 60 tablet, Rfl: 2  •  meloxicam (MOBIC) 15 MG tablet, TAKE 1 TABLET BY MOUTH EVERY DAY WITH FOOD, Disp: 30 tablet, Rfl: 3  •  methocarbamol (ROBAXIN) 500 MG tablet, TAKE 1 TABLET BY MOUTH 2 TIMES A DAY AS NEEDED FOR MUSCLE SPASMS., Disp: 60 tablet, Rfl: 5  •  metoclopramide (REGLAN) 10 MG tablet, TAKE 1 TABLET BY MOUTH 4 (FOUR)  "TIMES A DAY AS NEEDED (NAUSEA)., Disp: 120 tablet, Rfl: 0  •  metoprolol succinate XL (TOPROL-XL) 50 MG 24 hr tablet, TAKE 1 TABLET BY MOUTH DAILY. DOSE INCREASED 5/24/21, Disp: 90 tablet, Rfl: 1  •  montelukast (SINGULAIR) 10 MG tablet, Take 10 mg by mouth Every Night., Disp: , Rfl:   •  naloxone (NARCAN) 0.4 MG/ML injection, Infuse 0.5 mL into a venous catheter Every 5 (Five) Minutes As Needed for Opioid Reversal., Disp: , Rfl:   •  Needle, Disp, (Easy Touch FlipLock Needles) 25G X 1-1/2\" misc, 1 each Every 28 (Twenty-Eight) Days. For administering B12 cyanocobalomin. Dx vitamin B12 deficiency., Disp: 10 each, Rfl: 3  •  Needle, Disp, (Hypodermic Needle) 20G X 1\" misc, 1 each Every 28 (Twenty-Eight) Days. For drawing up B12 for injection monthly, change back to smaller gauge needle prior to injection. Dx Vitamin B12  Deficiency., Disp: 10 each, Rfl: 2  •  nitroglycerin (NITROSTAT) 0.4 MG SL tablet, Place 1 tablet under the tongue Every 5 (Five) Minutes As Needed for Chest Pain. Take no more than 3 doses in 15 minutes., Disp: 20 tablet, Rfl: 11  •  ondansetron (ZOFRAN) 8 MG tablet, Take 1 tablet by mouth Every 8 (Eight) Hours As Needed for Nausea or Vomiting., Disp: 18 tablet, Rfl: 1  •  pantoprazole (PROTONIX) 20 MG EC tablet, Take 2 tablets by mouth Daily., Disp: 90 tablet, Rfl: 3  •  ranolazine (RANEXA) 500 MG 12 hr tablet, Take 1 tablet by mouth Every 12 (Twelve) Hours., Disp: 180 tablet, Rfl: 3  •  Syringe 20G X 1-1/2\" 3 ML misc, 1 each Every 28 (Twenty-Eight) Days. For injection cyanocobalomin, Dx vit B12 deficiency., Disp: 10 each, Rfl: 2  •  venlafaxine XR (EFFEXOR-XR) 75 MG 24 hr capsule, TAKE 1 CAPSULE BY MOUTH DAILY WITH BREAKFAST., Disp: 90 capsule, Rfl: 0  •  vitamin D (ERGOCALCIFEROL) 1.25 MG (20645 UT) capsule capsule, Take 1 capsule by mouth Every 7 (Seven) Days., Disp: 36 capsule, Rfl: 1    Past Surgical History:   Procedure Laterality Date   • ABDOMINAL SURGERY     • AMPUTATION FOOT     • " ANGIOPLASTY      coronary   • ANKLE ARTHROSCOPY Left 2/26/2021    Procedure: Left foot hardware removal, ankle arthroscopy, bone grafting , left foot exostectomy;  Surgeon: Ignacio Lord DPM;  Location: [facility] OR;  Service: Podiatry;  Laterality: Left;   • BREAST BIOPSY Right    • CARDIAC CATHETERIZATION     • CARDIAC CATHETERIZATION N/A 6/20/2017    Procedure: Right Heart Cath;  Surgeon: Can Kwon MD PhD;  Location: [facility] CATH INVASIVE LOCATION;  Service:    • CARDIAC CATHETERIZATION N/A 2/18/2020    Procedure: Left Heart Cath;  Surgeon: Catalina Cooper MD;  Location: [facility] CATH INVASIVE LOCATION;  Service: Cardiology;  Laterality: N/A;   • CARDIAC CATHETERIZATION N/A 4/6/2022    Procedure: Left Heart Cath;  Surgeon: Sheryl Navas MD;  Location: [facility] CATH INVASIVE LOCATION;  Service: Cardiology;  Laterality: N/A;   • CARPAL TUNNEL RELEASE     • CHOLECYSTECTOMY     • COLONOSCOPY N/A 6/24/2020    Procedure: COLONOSCOPY;  Surgeon: Julián Maldonado MD;  Location: [facility] ENDOSCOPY;  Service: Gastroenterology;  Laterality: N/A;   • CORONARY ARTERY BYPASS GRAFT  2005   • ENDOSCOPY N/A 10/19/2018    Procedure: ESOPHAGOGASTRODUODENOSCOPY possible dilation;  Surgeon: Julián Maldonado MD;  Location: [facility] ENDOSCOPY;  Service: Gastroenterology   • ENDOSCOPY N/A 6/24/2020    Procedure: ESOPHAGOGASTRODUODENOSCOPY WED appt please;  Surgeon: Julián Maldonado MD;  Location: [facility] ENDOSCOPY;  Service: Gastroenterology;  Laterality: N/A;   • ENDOSCOPY N/A 6/10/2022    Procedure: ESOPHAGOGASTRODUODENOSCOPY   room 380;  Surgeon: Jeremiah Wilkins MD;  Location: [facility] ENDOSCOPY;  Service: Gastroenterology;  Laterality: N/A;   • ENDOSCOPY AND COLONOSCOPY     • FOOT SURGERY      Toes   • FOOT SURGERY     • GASTRIC BANDING      Revision, laparoscopic   • HYSTERECTOMY     • INCISION AND DRAINAGE LEG Left 3/12/2021    Procedure: Left ankle arthroscopic irrigation and debridement, screw removal;  Surgeon:  Ignacio Lord DPM;  Location: [facility];  Service: Podiatry;  Laterality: Left;   • MOUTH SURGERY     • SALPINGO OOPHORECTOMY     • SHOULDER SURGERY     • SUBTALAR ARTHRODESIS Left 1/16/2019    Procedure: LEFT FOOT HARDWARE REMOVAL, FIFTH METATARSAL , OPEN REDUCTION INTERNAL FIXATION, CALCANEAL OSTEOTOMY;  Surgeon: Ignacio Lord DPM;  Location: [facility];  Service: Podiatry   • SUBTALAR ARTHRODESIS Left 10/16/2019    Procedure: foot hardware removal, subtalar joint fusion  possible de/reattachment of achilles tendon        (c-arm);  Surgeon: Ignacio Lord DPM;  Location: [facility];  Service: Podiatry   • SUBTALAR ARTHRODESIS Left 9/30/2020    Procedure: subtalar, talonavicular joint arthrodesis.  Removal hardware.          (c-arm);  Surgeon: Ignacio Lord DPM;  Location: [facility];  Service: Podiatry;  Laterality: Left;   • TRANSESOPHAGEAL ECHOCARDIOGRAM (LAMONTE)      With color flow       Family History   Problem Relation Age of Onset   • Diabetes Other    • Heart disease Other    • Hypertension Other    • Heart disease Mother    • Stroke Mother    • Hypertension Mother    • Diabetes Sister    • Heart disease Sister    • Hypertension Sister    • Heart disease Sister    • Diabetes Sister    • Hypertension Sister    • Diabetes Sister    • Diabetes Sister    • Diabetes Sister    • Diabetes Sister         Social History     Socioeconomic History   • Marital status:    Tobacco Use   • Smoking status: Never   • Smokeless tobacco: Never   Vaping Use   • Vaping Use: Never used   Substance and Sexual Activity   • Alcohol use: No   • Drug use: No   • Sexual activity: Defer        Review of Systems   Constitutional: Negative.    HENT: Negative.    Eyes: Negative.    Respiratory: Negative.    Cardiovascular: Negative.    Gastrointestinal: Negative.    Endocrine: Negative.    Genitourinary: Negative.    Musculoskeletal: Positive for arthralgias and joint swelling.   Skin: Negative.   "  Allergic/Immunologic: Negative.    Neurological: Negative.    Hematological: Negative.    Psychiatric/Behavioral: Negative.        PHYSICAL EXAMINATION:       Ht 172.7 cm (68\")   Wt 120 kg (265 lb)   BMI 40.29 kg/m²     Physical Exam  Vitals and nursing note reviewed.   Constitutional:       General: She is not in acute distress.     Appearance: She is well-developed. She is not toxic-appearing.   HENT:      Head: Normocephalic.   Pulmonary:      Effort: Pulmonary effort is normal. No respiratory distress.   Skin:     General: Skin is warm and dry.   Neurological:      Mental Status: She is alert and oriented to person, place, and time.   Psychiatric:         Behavior: Behavior normal.         Thought Content: Thought content normal.         Judgment: Judgment normal.         GAIT:     []  Normal  []  Antalgic    Assistive device: []  None  []  Walker     []  Crutches  []  Cane     []  Wheelchair  []  Stretcher    Left Elbow Exam     Tenderness   Left elbow tenderness location: olecranon bursa      Range of Motion   The patient has normal left elbow ROM.    Other   Erythema: absent  Sensation: normal  Pulse: present    Comments:  No evidence of infection.  Mild olecranon bursitis.  Neurovascular intact.              XR Elbow 3+ View Left    Result Date: 12/11/2022  Narrative: EXAM: XR ELBOW 3 VIEWS LEFT HISTORY: eval effusion, M25.422 Effusion, left elbow. COMPARISON: None FINDINGS: Mineralization: Mild osseous demineralization Bones: Capitellar lucency with mild irregularity of the overlying cortex. No visualized fracture. Small cysts in the proximal ulna. Small posterior olecranon enthesophyte Soft tissues: Posterior elbow soft tissue swelling. No significant effusion.     Impression: Posterior elbow soft tissue swelling, likely olecranon bursitis, which could be related to gout, infection or trauma Radiocapitellar degenerative changes Electronically signed by:  Gaurav Carreon DO  12/11/2022 9:16 AM CST " Workstation: [id number]          ASSESSMENT:    Diagnoses and all orders for this visit:    Left elbow pain    Olecranon bursitis, left elbow    Other orders  -     Medium Joint Arthrocentesis: L elbow          PLAN    Medium Joint Arthrocentesis: L elbow  Date/Time: 12/21/2022 8:36 AM  Consent given by: patient  Site marked: site marked  Timeout: Immediately prior to procedure a time out was called to verify the correct patient, procedure, equipment, support staff and site/side marked as required   Supporting Documentation  Indications: pain   Procedure Details  Location: elbow - L elbow  Preparation: Patient was prepped and draped in the usual sterile fashion  Needle size: 22 G  Approach: anterolateral  Medications administered: 20 mg triamcinolone acetonide 40 MG/ML; 0.5 mL lidocaine 1 %  Aspirate amount: 4 mL  Aspirate: clear and yellow  Patient tolerance: patient tolerated the procedure well with no immediate complications            Olecranon bursitis diagnosed off exam.  After discussing risk, benefits, alternatives, patient desires to proceed with therapeutic aspiration and injection of steroid.  Patient tolerated injection well.  Patient is diabetic and instructed to monitor blood sugars more closely for the next few days.  Signs and symptoms to report including when to seek care explained.  How to perform RICE therapy explained.  Patient is to return in approximately 2 to 4 weeks if symptoms do not resolve.  How to prevent recurrent bursitis explained.  Patient verbalized understanding.    Home exercise program provided.    Return in about 4 weeks (around 1/18/2023).    EMR Dragon/Transciption Disclaimer: Some of this note may be an electronic transcription/translation of spoken language to printed text.  The electronic translation of spoken language may permit erroneous, or at times, nonsensical words or phrases to be inadvertently transcribed. Although I have reviewed the note for such errors, some may  still exist.       This document has been electronically signed by Meera SPENEC on December 21, 2022 13:15 CST

## 2022-12-22 ENCOUNTER — APPOINTMENT (OUTPATIENT)
Dept: CT IMAGING | Facility: HOSPITAL | Age: 62
End: 2022-12-22

## 2022-12-22 ENCOUNTER — HOSPITAL ENCOUNTER (EMERGENCY)
Facility: HOSPITAL | Age: 62
Discharge: HOME | End: 2022-12-22
Attending: EMERGENCY MEDICINE | Admitting: EMERGENCY MEDICINE

## 2022-12-22 ENCOUNTER — APPOINTMENT (OUTPATIENT)
Dept: GENERAL RADIOLOGY | Facility: HOSPITAL | Age: 62
End: 2022-12-22

## 2022-12-22 VITALS
HEIGHT: 68 IN | BODY MASS INDEX: 39.4 KG/M2 | TEMPERATURE: 98.5 F | RESPIRATION RATE: 16 BRPM | SYSTOLIC BLOOD PRESSURE: 143 MMHG | WEIGHT: 260 LBS | HEART RATE: 60 BPM | OXYGEN SATURATION: 98 % | DIASTOLIC BLOOD PRESSURE: 66 MMHG

## 2022-12-22 DIAGNOSIS — R51.9 ACUTE NONINTRACTABLE HEADACHE, UNSPECIFIED HEADACHE TYPE: Primary | ICD-10-CM

## 2022-12-22 LAB
ALBUMIN SERPL-MCNC: 4.2 G/DL (ref 3.5–5.2)
ALBUMIN/GLOB SERPL: 1.6 G/DL
ALP SERPL-CCNC: 129 U/L (ref 39–117)
ALT SERPL W P-5'-P-CCNC: 13 U/L (ref 1–33)
ANION GAP SERPL CALCULATED.3IONS-SCNC: 14 MMOL/L (ref 5–15)
APTT PPP: 27.5 SECONDS (ref 20–40.3)
AST SERPL-CCNC: 10 U/L (ref 1–32)
BASOPHILS # BLD AUTO: 0.06 10*3/MM3 (ref 0–0.2)
BASOPHILS NFR BLD AUTO: 0.4 % (ref 0–1.5)
BILIRUB SERPL-MCNC: 0.3 MG/DL (ref 0–1.2)
BUN SERPL-MCNC: 24 MG/DL (ref 8–23)
BUN/CREAT SERPL: 19.8 (ref 7–25)
CALCIUM SPEC-SCNC: 10 MG/DL (ref 8.6–10.5)
CHLORIDE SERPL-SCNC: 99 MMOL/L (ref 98–107)
CO2 SERPL-SCNC: 25 MMOL/L (ref 22–29)
CREAT SERPL-MCNC: 1.21 MG/DL (ref 0.57–1)
DEPRECATED RDW RBC AUTO: 39.8 FL (ref 37–54)
EGFRCR SERPLBLD CKD-EPI 2021: 51.4 ML/MIN/1.73
EOSINOPHIL # BLD AUTO: 0.08 10*3/MM3 (ref 0–0.4)
EOSINOPHIL NFR BLD AUTO: 0.5 % (ref 0.3–6.2)
ERYTHROCYTE [DISTWIDTH] IN BLOOD BY AUTOMATED COUNT: 12.8 % (ref 12.3–15.4)
GLOBULIN UR ELPH-MCNC: 2.6 GM/DL
GLUCOSE SERPL-MCNC: 193 MG/DL (ref 65–99)
HCT VFR BLD AUTO: 37.2 % (ref 34–46.6)
HGB BLD-MCNC: 12.5 G/DL (ref 12–15.9)
HOLD SPECIMEN: NORMAL
IMM GRANULOCYTES # BLD AUTO: 0.09 10*3/MM3 (ref 0–0.05)
IMM GRANULOCYTES NFR BLD AUTO: 0.5 % (ref 0–0.5)
INR PPP: 1.05 (ref 0.8–1.2)
LYMPHOCYTES # BLD AUTO: 1.76 10*3/MM3 (ref 0.7–3.1)
LYMPHOCYTES NFR BLD AUTO: 10.7 % (ref 19.6–45.3)
MCH RBC QN AUTO: 28.7 PG (ref 26.6–33)
MCHC RBC AUTO-ENTMCNC: 33.6 G/DL (ref 31.5–35.7)
MCV RBC AUTO: 85.5 FL (ref 79–97)
MONOCYTES # BLD AUTO: 0.71 10*3/MM3 (ref 0.1–0.9)
MONOCYTES NFR BLD AUTO: 4.3 % (ref 5–12)
NEUTROPHILS NFR BLD AUTO: 13.71 10*3/MM3 (ref 1.7–7)
NEUTROPHILS NFR BLD AUTO: 83.6 % (ref 42.7–76)
NRBC BLD AUTO-RTO: 0 /100 WBC (ref 0–0.2)
PLATELET # BLD AUTO: 421 10*3/MM3 (ref 140–450)
PMV BLD AUTO: 9.5 FL (ref 6–12)
POTASSIUM SERPL-SCNC: 4.2 MMOL/L (ref 3.5–5.2)
PROT SERPL-MCNC: 6.8 G/DL (ref 6–8.5)
PROTHROMBIN TIME: 13.6 SECONDS (ref 11.1–15.3)
RBC # BLD AUTO: 4.35 10*6/MM3 (ref 3.77–5.28)
SODIUM SERPL-SCNC: 138 MMOL/L (ref 136–145)
TROPONIN T SERPL-MCNC: <0.01 NG/ML (ref 0–0.03)
WBC NRBC COR # BLD: 16.41 10*3/MM3 (ref 3.4–10.8)
WHOLE BLOOD HOLD COAG: NORMAL
WHOLE BLOOD HOLD SPECIMEN: NORMAL

## 2022-12-22 PROCEDURE — 85730 THROMBOPLASTIN TIME PARTIAL: CPT | Performed by: EMERGENCY MEDICINE

## 2022-12-22 PROCEDURE — 25010000002 KETOROLAC TROMETHAMINE PER 15 MG: Performed by: EMERGENCY MEDICINE

## 2022-12-22 PROCEDURE — 70450 CT HEAD/BRAIN W/O DYE: CPT

## 2022-12-22 PROCEDURE — 36415 COLL VENOUS BLD VENIPUNCTURE: CPT

## 2022-12-22 PROCEDURE — 71045 X-RAY EXAM CHEST 1 VIEW: CPT

## 2022-12-22 PROCEDURE — 70498 CT ANGIOGRAPHY NECK: CPT

## 2022-12-22 PROCEDURE — 84484 ASSAY OF TROPONIN QUANT: CPT | Performed by: EMERGENCY MEDICINE

## 2022-12-22 PROCEDURE — 99283 EMERGENCY DEPT VISIT LOW MDM: CPT

## 2022-12-22 PROCEDURE — 25010000002 PROCHLORPERAZINE 10 MG/2ML SOLUTION: Performed by: EMERGENCY MEDICINE

## 2022-12-22 PROCEDURE — 96375 TX/PRO/DX INJ NEW DRUG ADDON: CPT

## 2022-12-22 PROCEDURE — 70496 CT ANGIOGRAPHY HEAD: CPT

## 2022-12-22 PROCEDURE — 25010000002 HYDROMORPHONE 1 MG/ML SOLUTION: Performed by: EMERGENCY MEDICINE

## 2022-12-22 PROCEDURE — 25010000002 DIPHENHYDRAMINE PER 50 MG: Performed by: EMERGENCY MEDICINE

## 2022-12-22 PROCEDURE — 80053 COMPREHEN METABOLIC PANEL: CPT | Performed by: EMERGENCY MEDICINE

## 2022-12-22 PROCEDURE — 93005 ELECTROCARDIOGRAM TRACING: CPT | Performed by: EMERGENCY MEDICINE

## 2022-12-22 PROCEDURE — 85025 COMPLETE CBC W/AUTO DIFF WBC: CPT | Performed by: EMERGENCY MEDICINE

## 2022-12-22 PROCEDURE — 0 IOPAMIDOL PER 1 ML: Performed by: EMERGENCY MEDICINE

## 2022-12-22 PROCEDURE — 93010 ELECTROCARDIOGRAM REPORT: CPT | Performed by: INTERNAL MEDICINE

## 2022-12-22 PROCEDURE — 96374 THER/PROPH/DIAG INJ IV PUSH: CPT

## 2022-12-22 PROCEDURE — 85610 PROTHROMBIN TIME: CPT | Performed by: EMERGENCY MEDICINE

## 2022-12-22 RX ORDER — PROCHLORPERAZINE EDISYLATE 5 MG/ML
2.5 INJECTION INTRAMUSCULAR; INTRAVENOUS ONCE
Status: COMPLETED | OUTPATIENT
Start: 2022-12-22 | End: 2022-12-22

## 2022-12-22 RX ORDER — KETOROLAC TROMETHAMINE 15 MG/ML
15 INJECTION, SOLUTION INTRAMUSCULAR; INTRAVENOUS ONCE
Status: COMPLETED | OUTPATIENT
Start: 2022-12-22 | End: 2022-12-22

## 2022-12-22 RX ORDER — DIPHENHYDRAMINE HYDROCHLORIDE 50 MG/ML
12.5 INJECTION INTRAMUSCULAR; INTRAVENOUS ONCE
Status: COMPLETED | OUTPATIENT
Start: 2022-12-22 | End: 2022-12-22

## 2022-12-22 RX ORDER — BUTALBITAL, ACETAMINOPHEN AND CAFFEINE 50; 325; 40 MG/1; MG/1; MG/1
1 TABLET ORAL 2 TIMES DAILY PRN
Qty: 6 TABLET | Refills: 0 | Status: SHIPPED | OUTPATIENT
Start: 2022-12-22 | End: 2023-01-04 | Stop reason: SDUPTHER

## 2022-12-22 RX ORDER — SODIUM CHLORIDE 0.9 % (FLUSH) 0.9 %
10 SYRINGE (ML) INJECTION AS NEEDED
Status: DISCONTINUED | OUTPATIENT
Start: 2022-12-22 | End: 2022-12-22 | Stop reason: HOSPADM

## 2022-12-22 RX ORDER — SODIUM CHLORIDE 9 MG/ML
100 INJECTION, SOLUTION INTRAVENOUS
Status: DISCONTINUED | OUTPATIENT
Start: 2022-12-22 | End: 2022-12-22

## 2022-12-22 RX ADMIN — KETOROLAC TROMETHAMINE 15 MG: 15 INJECTION, SOLUTION INTRAMUSCULAR; INTRAVENOUS at 13:36

## 2022-12-22 RX ADMIN — IOPAMIDOL 90 ML: 755 INJECTION, SOLUTION INTRAVENOUS at 12:03

## 2022-12-22 RX ADMIN — PROCHLORPERAZINE EDISYLATE 2.5 MG: 5 INJECTION INTRAMUSCULAR; INTRAVENOUS at 13:36

## 2022-12-22 RX ADMIN — HYDROMORPHONE HYDROCHLORIDE 0.5 MG: 1 INJECTION, SOLUTION INTRAMUSCULAR; INTRAVENOUS; SUBCUTANEOUS at 14:24

## 2022-12-22 RX ADMIN — DIPHENHYDRAMINE HYDROCHLORIDE 12.5 MG: 50 INJECTION, SOLUTION INTRAMUSCULAR; INTRAVENOUS at 13:36

## 2022-12-22 NOTE — ED PROVIDER NOTES
Subjective     Headache  Pain location:  Generalized  Quality:  Dull  Radiates to:  Does not radiate  Severity currently:  10/10  Severity at highest:  10/10  Onset quality:  Gradual  Duration:  1 day  Timing:  Constant  Progression:  Unchanged  Chronicity:  New (No prior history of headache disorders)  Similar to prior headaches: no    Context comment:  The patient gradually developed a headache earlier today and then began to notice tingling in and paresthesias in the left side of her face especially over the left cheek.  Relieved by:  Nothing  Worsened by:  Nothing  Ineffective treatments:  None tried  Associated symptoms: ear pain, paresthesias and tingling    Associated symptoms: no back pain, no blurred vision, no congestion, no dizziness, no eye pain, no facial pain, no fever, no focal weakness, no hearing loss, no loss of balance, no neck pain, no neck stiffness, no sore throat, no visual change and no vomiting        Review of Systems   Constitutional: Negative for fever.   HENT: Positive for ear pain. Negative for congestion, hearing loss and sore throat.    Eyes: Negative for blurred vision and pain.   Gastrointestinal: Negative for vomiting.   Musculoskeletal: Negative for back pain, neck pain and neck stiffness.   Neurological: Positive for headaches and paresthesias. Negative for dizziness, focal weakness and loss of balance.   All other systems reviewed and are negative.      Past Medical History:   Diagnosis Date   • Acute bacterial endocarditis 3/13/2021   • Angina, class IV ([facility])    • Anxiety    • Anxiety and depression    • Arthritis    • Benign paroxysmal positional vertigo    • Bladder disorder     has bladder stimulator   • Carpal tunnel syndrome    • CHF (congestive heart failure) ([facility])    • Chronic pain    • Coronary atherosclerosis     hx CABG 2005.  is followed by Dr Kwon   • Depression    • Diabetes mellitus ([facility])     Type 2, controlled   • Diabetic polyneuropathy ([facility])    • Disease  of thyroid gland    • Elevated cholesterol    • Female stress incontinence    • Foot pain, left    • Full dentures    • Gastroparesis    • GERD (gastroesophageal reflux disease)    • Hyperlipidemia    • Hypertension    • Low back pain    • Malaise and fatigue    • Multiple joint pain    • Obesity     Refuses to be weighed   • Occlusion and stenosis of bilateral carotid arteries 6/18/2021   • Otalgia     Both   • Perforation of tympanic membrane     Left   • Postoperative wound infection    • Vitamin D deficiency    • Wears glasses     reading       Allergies   Allergen Reactions   • Seroquel [Quetiapine] Anaphylaxis   • Avandia [Rosiglitazone] Swelling   • Morphine And Related Hallucinations   • Oxycodone Hallucinations       Past Surgical History:   Procedure Laterality Date   • ABDOMINAL SURGERY     • AMPUTATION FOOT     • ANGIOPLASTY      coronary   • ANKLE ARTHROSCOPY Left 2/26/2021    Procedure: Left foot hardware removal, ankle arthroscopy, bone grafting , left foot exostectomy;  Surgeon: Ignacio Lord DPM;  Location: [facility] OR;  Service: Podiatry;  Laterality: Left;   • BREAST BIOPSY Right    • CARDIAC CATHETERIZATION     • CARDIAC CATHETERIZATION N/A 6/20/2017    Procedure: Right Heart Cath;  Surgeon: Can Kwon MD PhD;  Location: [facility] CATH INVASIVE LOCATION;  Service:    • CARDIAC CATHETERIZATION N/A 2/18/2020    Procedure: Left Heart Cath;  Surgeon: Catalina Cooper MD;  Location: [facility] CATH INVASIVE LOCATION;  Service: Cardiology;  Laterality: N/A;   • CARDIAC CATHETERIZATION N/A 4/6/2022    Procedure: Left Heart Cath;  Surgeon: Sheryl Navas MD;  Location: [facility] CATH INVASIVE LOCATION;  Service: Cardiology;  Laterality: N/A;   • CARPAL TUNNEL RELEASE     • CHOLECYSTECTOMY     • COLONOSCOPY N/A 6/24/2020    Procedure: COLONOSCOPY;  Surgeon: Julián Maldonado MD;  Location: [facility] ENDOSCOPY;  Service: Gastroenterology;  Laterality: N/A;   • CORONARY ARTERY BYPASS GRAFT  2005   •  ENDOSCOPY N/A 10/19/2018    Procedure: ESOPHAGOGASTRODUODENOSCOPY possible dilation;  Surgeon: Julián Maldonado MD;  Location: [facility] ENDOSCOPY;  Service: Gastroenterology   • ENDOSCOPY N/A 6/24/2020    Procedure: ESOPHAGOGASTRODUODENOSCOPY WED appt please;  Surgeon: Julián Maldonado MD;  Location: [facility] ENDOSCOPY;  Service: Gastroenterology;  Laterality: N/A;   • ENDOSCOPY N/A 6/10/2022    Procedure: ESOPHAGOGASTRODUODENOSCOPY   room 380;  Surgeon: Jeremiah Wilkins MD;  Location: [facility] ENDOSCOPY;  Service: Gastroenterology;  Laterality: N/A;   • ENDOSCOPY AND COLONOSCOPY     • FOOT SURGERY      Toes   • FOOT SURGERY     • GASTRIC BANDING      Revision, laparoscopic   • HYSTERECTOMY     • INCISION AND DRAINAGE LEG Left 3/12/2021    Procedure: Left ankle arthroscopic irrigation and debridement, screw removal;  Surgeon: Ignacio Lord DPM;  Location: [facility] OR;  Service: Podiatry;  Laterality: Left;   • MOUTH SURGERY     • SALPINGO OOPHORECTOMY     • SHOULDER SURGERY     • SUBTALAR ARTHRODESIS Left 1/16/2019    Procedure: LEFT FOOT HARDWARE REMOVAL, FIFTH METATARSAL , OPEN REDUCTION INTERNAL FIXATION, CALCANEAL OSTEOTOMY;  Surgeon: Ignacio Lord DPM;  Location: [facility] OR;  Service: Podiatry   • SUBTALAR ARTHRODESIS Left 10/16/2019    Procedure: foot hardware removal, subtalar joint fusion  possible de/reattachment of achilles tendon        (c-arm);  Surgeon: Ignacio Lord DPM;  Location: [facility] OR;  Service: Podiatry   • SUBTALAR ARTHRODESIS Left 9/30/2020    Procedure: subtalar, talonavicular joint arthrodesis.  Removal hardware.          (c-arm);  Surgeon: Ignacio Lord DPM;  Location: [facility] OR;  Service: Podiatry;  Laterality: Left;   • TRANSESOPHAGEAL ECHOCARDIOGRAM (LAMONTE)      With color flow       Family History   Problem Relation Age of Onset   • Diabetes Other    • Heart disease Other    • Hypertension Other    • Heart disease Mother    • Stroke Mother    • Hypertension Mother    •  Diabetes Sister    • Heart disease Sister    • Hypertension Sister    • Heart disease Sister    • Diabetes Sister    • Hypertension Sister    • Diabetes Sister    • Diabetes Sister    • Diabetes Sister    • Diabetes Sister        Social History     Socioeconomic History   • Marital status:    Tobacco Use   • Smoking status: Never   • Smokeless tobacco: Never   Vaping Use   • Vaping Use: Never used   Substance and Sexual Activity   • Alcohol use: No   • Drug use: No   • Sexual activity: Defer           Objective   Physical Exam  Vitals and nursing note reviewed.   Constitutional:       Appearance: She is not ill-appearing.   HENT:      Head: Normocephalic and atraumatic.      Right Ear: Tympanic membrane, ear canal and external ear normal.      Left Ear: Ear canal and external ear normal.      Ears:      Comments: Left TM appears retracted.     Nose: Nose normal.   Eyes:      General: No scleral icterus.  Cardiovascular:      Rate and Rhythm: Normal rate and regular rhythm.   Pulmonary:      Effort: Pulmonary effort is normal.      Breath sounds: Normal breath sounds.   Musculoskeletal:         General: No signs of injury.      Cervical back: Normal range of motion. No tenderness.   Skin:     General: Skin is warm and dry.   Neurological:      Mental Status: She is alert.      Comments: NIH stroke scale score equals 0.   Psychiatric:         Behavior: Behavior normal.         Procedures           ED Course  ED Course as of 12/22/22 1739   Thu Dec 22, 2022   1324 WBC(!): 16.41  Leukocytosis appears to be chronic [JV]      ED Course User Index  [JV] Julián De Leon DO                                           MDM  Number of Diagnoses or Management Options     Amount and/or Complexity of Data Reviewed  Clinical lab tests: reviewed  Tests in the radiology section of CPT®: reviewed  Tests in the medicine section of CPT®: reviewed  Decide to obtain previous medical records or to obtain history from someone other  than the patient: yes  Review and summarize past medical records: yes  Discuss the patient with other providers: yes      Attempted to evaluate the patient at 11:44 AM but the patient was in CT scanner.    NIH stroke scale score performed at 12:35 PM indicates a score of 0.    EKG interpretation: Interpreted by myself.  Normal sinus rhythm.  Rate 64.  Normal P wave and UT interval.  Normal QRS and axis.  Normal QTc interval.  ST segments are normal.    Case including radiology discovered with teleneurology.  They feel that the patient's symptoms do not indicate a stroke.  The patient is not a candidate for MRI due to implanted metal.  They do not believe that the partial blockage of the A1 segment on the right side is in a proper location to be causing symptoms in the face.  Therefore they do not believe that she is experiencing ischemic stroke and would like the patient's headache treated and should the headache improve and no progressive symptoms develop I suspect the patient can be discharged home to follow-up as an outpatient.    On reevaluation at 3 PM the patient reports her symptoms are improving.  We shared medical decision making about whether or not she should be admitted or to be discharged.  She was offered admission for further evaluation.  Ultimately she felt that she would want to return home and follow-up as an outpatient.  She is welcome to return should any of her symptoms worsen or new or concerning symptoms develop.  Final diagnoses:   Acute nonintractable headache, unspecified headache type       ED Disposition  ED Disposition     ED Disposition   Discharge    Condition   Stable    Comment   --             Dolly Foss, APRN  [address] [place] [address] [phone]    Call today  To make an appointment to be reevaluated for your symptoms    [facility] EMERGENCY DEPARTMENT  [facility] [address] [phone]    As  needed, If symptoms worsen at any time or if you change your mind about being admitted         Medication List      New Prescriptions    butalbital-acetaminophen-caffeine -40 MG per tablet  Commonly known as: FIORICET, ESGIC  Take 1 tablet by mouth 2 (Two) Times a Day As Needed for Headache.        Changed    aspirin  MG tablet  Take 1 tablet by mouth Daily.  What changed: how much to take           Where to Get Your Medications      These medications were sent to [facility]/pharmacy #[id number] - [place], [place] - [address] [place] [address] - [phone]  - [phone] [address] [address]    Phone: [phone]   · butalbital-acetaminophen-caffeine -40 MG per tablet          Julián De Leon DO  12/22/22 6689

## 2022-12-26 DIAGNOSIS — E11.43 DIABETIC GASTROPARESIS: Chronic | ICD-10-CM

## 2022-12-26 DIAGNOSIS — K31.84 DIABETIC GASTROPARESIS: Chronic | ICD-10-CM

## 2022-12-26 DIAGNOSIS — R11.0 CHRONIC NAUSEA: Chronic | ICD-10-CM

## 2022-12-27 ENCOUNTER — HOSPITAL ENCOUNTER (EMERGENCY)
Facility: HOSPITAL | Age: 62
Discharge: HOME | End: 2022-12-27
Attending: STUDENT IN AN ORGANIZED HEALTH CARE EDUCATION/TRAINING PROGRAM | Admitting: STUDENT IN AN ORGANIZED HEALTH CARE EDUCATION/TRAINING PROGRAM

## 2022-12-27 VITALS
DIASTOLIC BLOOD PRESSURE: 67 MMHG | OXYGEN SATURATION: 99 % | RESPIRATION RATE: 16 BRPM | WEIGHT: 255 LBS | HEART RATE: 80 BPM | SYSTOLIC BLOOD PRESSURE: 144 MMHG | TEMPERATURE: 97.4 F | BODY MASS INDEX: 38.65 KG/M2 | HEIGHT: 68 IN

## 2022-12-27 DIAGNOSIS — R11.2 NAUSEA AND VOMITING, UNSPECIFIED VOMITING TYPE: Primary | ICD-10-CM

## 2022-12-27 DIAGNOSIS — I10 ESSENTIAL HYPERTENSION: Chronic | ICD-10-CM

## 2022-12-27 LAB
ALBUMIN SERPL-MCNC: 3.6 G/DL (ref 3.5–5.2)
ALBUMIN/GLOB SERPL: 1.3 G/DL
ALP SERPL-CCNC: 117 U/L (ref 39–117)
ALT SERPL W P-5'-P-CCNC: 14 U/L (ref 1–33)
ANION GAP SERPL CALCULATED.3IONS-SCNC: 8 MMOL/L (ref 5–15)
AST SERPL-CCNC: 12 U/L (ref 1–32)
BASOPHILS # BLD AUTO: 0.07 10*3/MM3 (ref 0–0.2)
BASOPHILS NFR BLD AUTO: 0.5 % (ref 0–1.5)
BILIRUB SERPL-MCNC: 0.3 MG/DL (ref 0–1.2)
BUN SERPL-MCNC: 16 MG/DL (ref 8–23)
BUN/CREAT SERPL: 17 (ref 7–25)
CALCIUM SPEC-SCNC: 9.1 MG/DL (ref 8.6–10.5)
CHLORIDE SERPL-SCNC: 106 MMOL/L (ref 98–107)
CO2 SERPL-SCNC: 24 MMOL/L (ref 22–29)
CREAT SERPL-MCNC: 0.94 MG/DL (ref 0.57–1)
DEPRECATED RDW RBC AUTO: 42.8 FL (ref 37–54)
EGFRCR SERPLBLD CKD-EPI 2021: 69.6 ML/MIN/1.73
EOSINOPHIL # BLD AUTO: 0.41 10*3/MM3 (ref 0–0.4)
EOSINOPHIL NFR BLD AUTO: 2.9 % (ref 0.3–6.2)
ERYTHROCYTE [DISTWIDTH] IN BLOOD BY AUTOMATED COUNT: 13.4 % (ref 12.3–15.4)
GLOBULIN UR ELPH-MCNC: 2.8 GM/DL
GLUCOSE SERPL-MCNC: 247 MG/DL (ref 65–99)
HCT VFR BLD AUTO: 37 % (ref 34–46.6)
HGB BLD-MCNC: 12.4 G/DL (ref 12–15.9)
HOLD SPECIMEN: NORMAL
IMM GRANULOCYTES # BLD AUTO: 0.07 10*3/MM3 (ref 0–0.05)
IMM GRANULOCYTES NFR BLD AUTO: 0.5 % (ref 0–0.5)
LYMPHOCYTES # BLD AUTO: 2.61 10*3/MM3 (ref 0.7–3.1)
LYMPHOCYTES NFR BLD AUTO: 18.3 % (ref 19.6–45.3)
MAGNESIUM SERPL-MCNC: 1.9 MG/DL (ref 1.6–2.4)
MCH RBC QN AUTO: 29.5 PG (ref 26.6–33)
MCHC RBC AUTO-ENTMCNC: 33.5 G/DL (ref 31.5–35.7)
MCV RBC AUTO: 87.9 FL (ref 79–97)
MONOCYTES # BLD AUTO: 1.09 10*3/MM3 (ref 0.1–0.9)
MONOCYTES NFR BLD AUTO: 7.7 % (ref 5–12)
NEUTROPHILS NFR BLD AUTO: 70.1 % (ref 42.7–76)
NEUTROPHILS NFR BLD AUTO: 9.98 10*3/MM3 (ref 1.7–7)
NRBC BLD AUTO-RTO: 0 /100 WBC (ref 0–0.2)
PLATELET # BLD AUTO: 373 10*3/MM3 (ref 140–450)
PMV BLD AUTO: 9.4 FL (ref 6–12)
POTASSIUM SERPL-SCNC: 3.9 MMOL/L (ref 3.5–5.2)
PROT SERPL-MCNC: 6.4 G/DL (ref 6–8.5)
RBC # BLD AUTO: 4.21 10*6/MM3 (ref 3.77–5.28)
SODIUM SERPL-SCNC: 138 MMOL/L (ref 136–145)
WBC NRBC COR # BLD: 14.23 10*3/MM3 (ref 3.4–10.8)
WHOLE BLOOD HOLD COAG: NORMAL

## 2022-12-27 PROCEDURE — 80053 COMPREHEN METABOLIC PANEL: CPT | Performed by: STUDENT IN AN ORGANIZED HEALTH CARE EDUCATION/TRAINING PROGRAM

## 2022-12-27 PROCEDURE — 83735 ASSAY OF MAGNESIUM: CPT | Performed by: STUDENT IN AN ORGANIZED HEALTH CARE EDUCATION/TRAINING PROGRAM

## 2022-12-27 PROCEDURE — 96375 TX/PRO/DX INJ NEW DRUG ADDON: CPT

## 2022-12-27 PROCEDURE — 85025 COMPLETE CBC W/AUTO DIFF WBC: CPT | Performed by: STUDENT IN AN ORGANIZED HEALTH CARE EDUCATION/TRAINING PROGRAM

## 2022-12-27 PROCEDURE — 99283 EMERGENCY DEPT VISIT LOW MDM: CPT

## 2022-12-27 PROCEDURE — 25010000002 ONDANSETRON PER 1 MG: Performed by: STUDENT IN AN ORGANIZED HEALTH CARE EDUCATION/TRAINING PROGRAM

## 2022-12-27 PROCEDURE — 25010000002 METOCLOPRAMIDE PER 10 MG: Performed by: STUDENT IN AN ORGANIZED HEALTH CARE EDUCATION/TRAINING PROGRAM

## 2022-12-27 PROCEDURE — 96374 THER/PROPH/DIAG INJ IV PUSH: CPT

## 2022-12-27 RX ORDER — PEN NEEDLE, DIABETIC 31 GX5/16"
NEEDLE, DISPOSABLE MISCELLANEOUS
Qty: 200 EACH | Refills: 1 | Status: SHIPPED | OUTPATIENT
Start: 2022-12-27

## 2022-12-27 RX ORDER — ONDANSETRON 2 MG/ML
4 INJECTION INTRAMUSCULAR; INTRAVENOUS ONCE
Status: COMPLETED | OUTPATIENT
Start: 2022-12-27 | End: 2022-12-27

## 2022-12-27 RX ORDER — METOCLOPRAMIDE HYDROCHLORIDE 5 MG/ML
10 INJECTION INTRAMUSCULAR; INTRAVENOUS ONCE
Status: COMPLETED | OUTPATIENT
Start: 2022-12-27 | End: 2022-12-27

## 2022-12-27 RX ORDER — METOCLOPRAMIDE 10 MG/1
10 TABLET ORAL 4 TIMES DAILY PRN
Qty: 120 TABLET | Refills: 0 | OUTPATIENT
Start: 2022-12-27

## 2022-12-27 RX ORDER — METOCLOPRAMIDE 5 MG/1
5 TABLET ORAL 3 TIMES DAILY PRN
Qty: 12 TABLET | Refills: 0 | Status: SHIPPED | OUTPATIENT
Start: 2022-12-27

## 2022-12-27 RX ORDER — ONDANSETRON 4 MG/1
4 TABLET, ORALLY DISINTEGRATING ORAL 4 TIMES DAILY PRN
Qty: 12 TABLET | Refills: 0 | Status: SHIPPED | OUTPATIENT
Start: 2022-12-27 | End: 2023-02-27 | Stop reason: SDUPTHER

## 2022-12-27 RX ORDER — METOPROLOL SUCCINATE 50 MG/1
50 TABLET, EXTENDED RELEASE ORAL DAILY
Qty: 90 TABLET | Refills: 1 | Status: SHIPPED | OUTPATIENT
Start: 2022-12-27

## 2022-12-27 RX ADMIN — METOCLOPRAMIDE 10 MG: 5 INJECTION, SOLUTION INTRAMUSCULAR; INTRAVENOUS at 05:35

## 2022-12-27 RX ADMIN — ONDANSETRON 4 MG: 2 INJECTION INTRAMUSCULAR; INTRAVENOUS at 05:35

## 2022-12-27 NOTE — ED PROVIDER NOTES
Subjective   History of Present Illness  62-year-old female history of gastroparesis comes to the ER 2-day history of nausea and vomiting.  Today she started having left arm achiness.  No injury or trauma.  She has been unable to keep her medications down due to vomiting.  She is try taking Zofran, but unable to keep that pill down as well.  Denies other symptoms.    History provided by:  Patient   used: No        Review of Systems   Constitutional: Positive for activity change, appetite change and fatigue. Negative for chills and fever.   HENT: Negative for drooling.    Eyes: Negative for redness.   Respiratory: Negative for cough, chest tightness, shortness of breath and wheezing.    Cardiovascular: Negative for chest pain and palpitations.   Gastrointestinal: Positive for nausea and vomiting. Negative for abdominal pain.   Genitourinary: Negative for flank pain.   Musculoskeletal: Positive for myalgias (left arm ache).   Skin: Negative for color change.   Neurological: Negative for seizures.   Psychiatric/Behavioral: Negative for confusion.       Past Medical History:   Diagnosis Date   • Acute bacterial endocarditis 3/13/2021   • Angina, class IV ([facility])    • Anxiety    • Anxiety and depression    • Arthritis    • Benign paroxysmal positional vertigo    • Bladder disorder     has bladder stimulator   • Carpal tunnel syndrome    • CHF (congestive heart failure) ([facility])    • Chronic pain    • Coronary atherosclerosis     hx CABG 2005.  is followed by Dr Kwon   • Depression    • Diabetes mellitus ([facility])     Type 2, controlled   • Diabetic polyneuropathy ([facility])    • Disease of thyroid gland    • Elevated cholesterol    • Female stress incontinence    • Foot pain, left    • Full dentures    • Gastroparesis    • GERD (gastroesophageal reflux disease)    • Hyperlipidemia    • Hypertension    • Low back pain    • Malaise and fatigue    • Multiple joint pain    • Obesity     Refuses to be weighed   •  Occlusion and stenosis of bilateral carotid arteries 6/18/2021   • Otalgia     Both   • Perforation of tympanic membrane     Left   • Postoperative wound infection    • Vitamin D deficiency    • Wears glasses     reading       Allergies   Allergen Reactions   • Seroquel [Quetiapine] Anaphylaxis   • Avandia [Rosiglitazone] Swelling   • Morphine And Related Hallucinations   • Oxycodone Hallucinations       Past Surgical History:   Procedure Laterality Date   • ABDOMINAL SURGERY     • AMPUTATION FOOT     • ANGIOPLASTY      coronary   • ANKLE ARTHROSCOPY Left 2/26/2021    Procedure: Left foot hardware removal, ankle arthroscopy, bone grafting , left foot exostectomy;  Surgeon: Ignacio Lord DPM;  Location: [facility] OR;  Service: Podiatry;  Laterality: Left;   • BREAST BIOPSY Right    • CARDIAC CATHETERIZATION     • CARDIAC CATHETERIZATION N/A 6/20/2017    Procedure: Right Heart Cath;  Surgeon: Can Kwon MD PhD;  Location: [facility] CATH INVASIVE LOCATION;  Service:    • CARDIAC CATHETERIZATION N/A 2/18/2020    Procedure: Left Heart Cath;  Surgeon: Catalina Cooper MD;  Location: [facility] CATH INVASIVE LOCATION;  Service: Cardiology;  Laterality: N/A;   • CARDIAC CATHETERIZATION N/A 4/6/2022    Procedure: Left Heart Cath;  Surgeon: Sheryl Navas MD;  Location: [facility] CATH INVASIVE LOCATION;  Service: Cardiology;  Laterality: N/A;   • CARPAL TUNNEL RELEASE     • CHOLECYSTECTOMY     • COLONOSCOPY N/A 6/24/2020    Procedure: COLONOSCOPY;  Surgeon: Julián Maldonado MD;  Location: [facility] ENDOSCOPY;  Service: Gastroenterology;  Laterality: N/A;   • CORONARY ARTERY BYPASS GRAFT  2005   • ENDOSCOPY N/A 10/19/2018    Procedure: ESOPHAGOGASTRODUODENOSCOPY possible dilation;  Surgeon: Julián Maldonado MD;  Location: [facility] ENDOSCOPY;  Service: Gastroenterology   • ENDOSCOPY N/A 6/24/2020    Procedure: ESOPHAGOGASTRODUODENOSCOPY WED appt please;  Surgeon: Julián Maldonado MD;  Location: [facility] ENDOSCOPY;  Service:  Gastroenterology;  Laterality: N/A;   • ENDOSCOPY N/A 6/10/2022    Procedure: ESOPHAGOGASTRODUODENOSCOPY   room 380;  Surgeon: Jeremiah Wilkins MD;  Location: [facility] ENDOSCOPY;  Service: Gastroenterology;  Laterality: N/A;   • ENDOSCOPY AND COLONOSCOPY     • FOOT SURGERY      Toes   • FOOT SURGERY     • GASTRIC BANDING      Revision, laparoscopic   • HYSTERECTOMY     • INCISION AND DRAINAGE LEG Left 3/12/2021    Procedure: Left ankle arthroscopic irrigation and debridement, screw removal;  Surgeon: Ignacio Lord DPM;  Location: [facility] OR;  Service: Podiatry;  Laterality: Left;   • MOUTH SURGERY     • SALPINGO OOPHORECTOMY     • SHOULDER SURGERY     • SUBTALAR ARTHRODESIS Left 1/16/2019    Procedure: LEFT FOOT HARDWARE REMOVAL, FIFTH METATARSAL , OPEN REDUCTION INTERNAL FIXATION, CALCANEAL OSTEOTOMY;  Surgeon: Ignacio Lord DPM;  Location: [facility] OR;  Service: Podiatry   • SUBTALAR ARTHRODESIS Left 10/16/2019    Procedure: foot hardware removal, subtalar joint fusion  possible de/reattachment of achilles tendon        (c-arm);  Surgeon: Ignacio Lord DPM;  Location: [facility] OR;  Service: Podiatry   • SUBTALAR ARTHRODESIS Left 9/30/2020    Procedure: subtalar, talonavicular joint arthrodesis.  Removal hardware.          (c-arm);  Surgeon: Ignacio Lord DPM;  Location: [facility];  Service: Podiatry;  Laterality: Left;   • TRANSESOPHAGEAL ECHOCARDIOGRAM (LAMONTE)      With color flow       Family History   Problem Relation Age of Onset   • Diabetes Other    • Heart disease Other    • Hypertension Other    • Heart disease Mother    • Stroke Mother    • Hypertension Mother    • Diabetes Sister    • Heart disease Sister    • Hypertension Sister    • Heart disease Sister    • Diabetes Sister    • Hypertension Sister    • Diabetes Sister    • Diabetes Sister    • Diabetes Sister    • Diabetes Sister        Social History     Socioeconomic History   • Marital status:    Tobacco Use   • Smoking  "status: Never   • Smokeless tobacco: Never   Vaping Use   • Vaping Use: Never used   Substance and Sexual Activity   • Alcohol use: No   • Drug use: No   • Sexual activity: Defer           Objective    Vitals:    12/27/22 0455   BP: (!) 185/82   BP Location: Left arm   Patient Position: Sitting   Pulse: 84   Resp: 16   Temp: 97.4 °F (36.3 °C)   TempSrc: Oral   SpO2: 99%   Weight: 116 kg (255 lb)   Height: 172.7 cm (68\")       Physical Exam  Vitals and nursing note reviewed.   Constitutional:       General: She is not in acute distress.     Appearance: She is well-developed. She is obese. She is not ill-appearing or diaphoretic.   HENT:      Head: Normocephalic.      Right Ear: External ear normal.      Left Ear: External ear normal.   Eyes:      Conjunctiva/sclera: Conjunctivae normal.   Cardiovascular:      Pulses: Normal pulses.   Pulmonary:      Effort: Pulmonary effort is normal. No accessory muscle usage or respiratory distress.   Musculoskeletal:         General: No swelling, tenderness, deformity or signs of injury. Normal range of motion.   Skin:     General: Skin is warm and dry.      Capillary Refill: Capillary refill takes less than 2 seconds.   Neurological:      Mental Status: She is oriented to person, place, and time.      Sensory: No sensory deficit.      Motor: No weakness.   Psychiatric:         Behavior: Behavior normal.         Procedures           ED Course      Results for orders placed or performed during the hospital encounter of 12/27/22   Comprehensive Metabolic Panel    Specimen: Blood   Result Value Ref Range    Glucose 247 (H) 65 - 99 mg/dL    BUN 16 8 - 23 mg/dL    Creatinine 0.94 0.57 - 1.00 mg/dL    Sodium 138 136 - 145 mmol/L    Potassium 3.9 3.5 - 5.2 mmol/L    Chloride 106 98 - 107 mmol/L    CO2 24.0 22.0 - 29.0 mmol/L    Calcium 9.1 8.6 - 10.5 mg/dL    Total Protein 6.4 6.0 - 8.5 g/dL    Albumin 3.6 3.5 - 5.2 g/dL    ALT (SGPT) 14 1 - 33 U/L    AST (SGOT) 12 1 - 32 U/L    Alkaline " Phosphatase 117 39 - 117 U/L    Total Bilirubin 0.3 0.0 - 1.2 mg/dL    Globulin 2.8 gm/dL    A/G Ratio 1.3 g/dL    BUN/Creatinine Ratio 17.0 7.0 - 25.0    Anion Gap 8.0 5.0 - 15.0 mmol/L    eGFR 69.6 >60.0 mL/min/1.73   Magnesium    Specimen: Blood   Result Value Ref Range    Magnesium 1.9 1.6 - 2.4 mg/dL   CBC Auto Differential    Specimen: Blood   Result Value Ref Range    WBC 14.23 (H) 3.40 - 10.80 10*3/mm3    RBC 4.21 3.77 - 5.28 10*6/mm3    Hemoglobin 12.4 12.0 - 15.9 g/dL    Hematocrit 37.0 34.0 - 46.6 %    MCV 87.9 79.0 - 97.0 fL    MCH 29.5 26.6 - 33.0 pg    MCHC 33.5 31.5 - 35.7 g/dL    RDW 13.4 12.3 - 15.4 %    RDW-SD 42.8 37.0 - 54.0 fl    MPV 9.4 6.0 - 12.0 fL    Platelets 373 140 - 450 10*3/mm3    Neutrophil % 70.1 42.7 - 76.0 %    Lymphocyte % 18.3 (L) 19.6 - 45.3 %    Monocyte % 7.7 5.0 - 12.0 %    Eosinophil % 2.9 0.3 - 6.2 %    Basophil % 0.5 0.0 - 1.5 %    Immature Grans % 0.5 0.0 - 0.5 %    Neutrophils, Absolute 9.98 (H) 1.70 - 7.00 10*3/mm3    Lymphocytes, Absolute 2.61 0.70 - 3.10 10*3/mm3    Monocytes, Absolute 1.09 (H) 0.10 - 0.90 10*3/mm3    Eosinophils, Absolute 0.41 (H) 0.00 - 0.40 10*3/mm3    Basophils, Absolute 0.07 0.00 - 0.20 10*3/mm3    Immature Grans, Absolute 0.07 (H) 0.00 - 0.05 10*3/mm3    nRBC 0.0 0.0 - 0.2 /100 WBC     *Note: Due to a large number of results and/or encounters for the requested time period, some results have not been displayed. A complete set of results can be found in Results Review.                                          MDM  Number of Diagnoses or Management Options  Nausea and vomiting, unspecified vomiting type: new and requires workup  Diagnosis management comments: Vital signs are stable, afebrile.  Labs are unremarkable.  Patient received Zofran and Reglan.  Reevaluation she is feeling a little better.  Antiemetics calling her pharmacy.  Recommend follow-up with her PCP.  Return precautions given.  Patient states understanding and is agreeable to the  plan.      Final diagnoses:   Nausea and vomiting, unspecified vomiting type       ED Disposition  ED Disposition     ED Disposition   Discharge    Condition   Stable    Comment   --             Dolly Foss, APRN  [address] [place] [id number] [phone]    Schedule an appointment as soon as possible for a visit in 2 days  ER follow up         Medication List      New Prescriptions    ondansetron ODT 4 MG disintegrating tablet  Commonly known as: ZOFRAN-ODT  Place 1 tablet on the tongue 4 (Four) Times a Day As Needed for Nausea or Vomiting.        Changed    * metoclopramide 10 MG tablet  Commonly known as: REGLAN  TAKE 1 TABLET BY MOUTH 4 (FOUR) TIMES A DAY AS NEEDED (NAUSEA).  What changed: Another medication with the same name was added. Make sure you understand how and when to take each.     * metoclopramide 5 MG tablet  Commonly known as: REGLAN  Take 1 tablet by mouth 3 (Three) Times a Day As Needed (vomiting).  What changed: You were already taking a medication with the same name, and this prescription was added. Make sure you understand how and when to take each.         * This list has 2 medication(s) that are the same as other medications prescribed for you. Read the directions carefully, and ask your doctor or other care provider to review them with you.               Where to Get Your Medications      These medications were sent to [facility]/pharmacy #[id number] - [place], [place] - [address] [phone]  - [phone] [address] [address]    Phone: [phone]   · metoclopramide 5 MG tablet  · ondansetron ODT 4 MG disintegrating tablet          Addi Johnson MD  12/27/22 8338

## 2022-12-28 ENCOUNTER — LAB (OUTPATIENT)
Dept: LAB | Facility: OTHER | Age: 62
End: 2022-12-28
Payer: COMMERCIAL

## 2022-12-28 ENCOUNTER — OFFICE VISIT (OUTPATIENT)
Dept: FAMILY MEDICINE CLINIC | Facility: CLINIC | Age: 62
End: 2022-12-28
Payer: COMMERCIAL

## 2022-12-28 VITALS
HEART RATE: 66 BPM | DIASTOLIC BLOOD PRESSURE: 88 MMHG | OXYGEN SATURATION: 99 % | BODY MASS INDEX: 38.65 KG/M2 | HEIGHT: 68 IN | SYSTOLIC BLOOD PRESSURE: 154 MMHG | WEIGHT: 255 LBS | TEMPERATURE: 96.6 F

## 2022-12-28 DIAGNOSIS — Z86.79 H/O CEREBRAL ARTERY STENOSIS: ICD-10-CM

## 2022-12-28 DIAGNOSIS — R34 DECREASED URINE OUTPUT: ICD-10-CM

## 2022-12-28 DIAGNOSIS — R30.0 DYSURIA: ICD-10-CM

## 2022-12-28 DIAGNOSIS — R30.0 DYSURIA: Primary | ICD-10-CM

## 2022-12-28 LAB
BILIRUB UR QL STRIP: NEGATIVE
CLARITY UR: CLEAR
COLOR UR: YELLOW
GLUCOSE UR STRIP-MCNC: NEGATIVE MG/DL
HGB UR QL STRIP.AUTO: NEGATIVE
KETONES UR QL STRIP: NEGATIVE
LEUKOCYTE ESTERASE UR QL STRIP.AUTO: NEGATIVE
NITRITE UR QL STRIP: NEGATIVE
PH UR STRIP.AUTO: <=5 [PH] (ref 5.5–8)
PROT UR QL STRIP: NEGATIVE
SP GR UR STRIP: 1.01 (ref 1–1.03)
UROBILINOGEN UR QL STRIP: ABNORMAL

## 2022-12-28 PROCEDURE — 81003 URINALYSIS AUTO W/O SCOPE: CPT | Performed by: NURSE PRACTITIONER

## 2022-12-28 PROCEDURE — 99213 OFFICE O/P EST LOW 20 MIN: CPT | Performed by: NURSE PRACTITIONER

## 2022-12-28 NOTE — PROGRESS NOTES
Subjective   Ariana Martinez is a 63 y.o. female who presents to the office for [facility] ED follow-up, wilda rangel for acute nonintractable headache.  EKG normal, no stroke per their eval.  Was given Fioricet and she has taken twice since that day without side effects.  CT head was normal for age, no acute changes.  Does still have ODT Zofran and Reglan.  Noted   History of Present Illness     The following portions of the patient's history were reviewed and updated as appropriate: allergies, current medications, past family history, past medical history, past social history, past surgical history and problem list.    Review of Systems   Constitutional: Negative for chills, fatigue and fever.   HENT: Negative for congestion, sneezing, sore throat and trouble swallowing.    Eyes: Negative for visual disturbance.   Respiratory: Negative for cough, chest tightness, shortness of breath and wheezing.    Cardiovascular: Negative for chest pain, palpitations and leg swelling.   Gastrointestinal: Negative for abdominal pain, constipation, diarrhea, nausea and vomiting.   Genitourinary: Negative for dysuria, frequency and urgency.   Musculoskeletal: Negative for neck pain.   Skin: Negative for rash.   Neurological: Negative for dizziness, weakness and headaches.   Psychiatric/Behavioral:        In the past two weeks the pt has not felt down, depressed, hopeless or lost interest in doing things   All other systems reviewed and are negative.      Objective   Physical Exam  Vitals and nursing note reviewed.   Constitutional:       General: She is not in acute distress.     Appearance: Normal appearance. She is well-developed. She is ill-appearing (chronic).   HENT:      Head: Normocephalic and atraumatic.      Right Ear: External ear normal.      Left Ear: External ear normal.      Nose: Nose normal.      Mouth/Throat:      Lips: Pink.      Mouth: Mucous membranes are moist.      Pharynx: Oropharynx is clear. Uvula midline.   Eyes:       General: No scleral icterus.        Right eye: No discharge.         Left eye: No discharge.      Conjunctiva/sclera: Conjunctivae normal.      Pupils: Pupils are equal, round, and reactive to light.   Neck:      Thyroid: No thyromegaly.   Cardiovascular:      Rate and Rhythm: Normal rate and regular rhythm.      Heart sounds: Normal heart sounds. No murmur heard.    No friction rub. No gallop.   Pulmonary:      Effort: Pulmonary effort is normal. No respiratory distress.      Breath sounds: Normal breath sounds. No wheezing or rales.   Abdominal:      General: Bowel sounds are normal. There is no distension.      Palpations: Abdomen is soft.      Tenderness: There is no abdominal tenderness. There is no guarding or rebound.   Musculoskeletal:         General: Normal range of motion.      Cervical back: Normal range of motion and neck supple.   Lymphadenopathy:      Cervical: No cervical adenopathy.   Skin:     General: Skin is warm and dry.      Capillary Refill: Capillary refill takes less than 2 seconds.      Findings: No rash.   Neurological:      General: No focal deficit present.      Mental Status: She is alert and oriented to person, place, and time.      GCS: GCS eye subscore is 4. GCS verbal subscore is 5. GCS motor subscore is 6.      Cranial Nerves: Cranial nerves 2-12 are intact. No cranial nerve deficit.   Psychiatric:         Behavior: Behavior normal. Behavior is cooperative.         Thought Content: Thought content normal.         Judgment: Judgment normal.         Assessment & Plan   Diagnoses and all orders for this visit:    1. Dysuria (Primary)  -     Urinalysis With Culture If Indicated -; Future    2. RIGHT anterior cerebral artery stenosis  -     Ambulatory Referral to Neurosurgery    3. Decreased urine output  -     US Bladder Volume; Future    Will send her off to neurosurgery for their evaluation regarding the stenosis of her cerebral artery.    Will get a bladder ultrasound as well  as urinalysis.  Continue with all meds at this time.             This document has been electronically signed by BEBE Rob on January 10, 2023 07:44 CST

## 2022-12-29 DIAGNOSIS — F41.1 GENERALIZED ANXIETY DISORDER: Chronic | ICD-10-CM

## 2022-12-29 DIAGNOSIS — F33.1 DEPRESSION, MAJOR, RECURRENT, MODERATE: Chronic | ICD-10-CM

## 2022-12-29 DIAGNOSIS — E55.9 VITAMIN D DEFICIENCY: ICD-10-CM

## 2022-12-29 RX ORDER — ERGOCALCIFEROL 1.25 MG/1
CAPSULE ORAL
Qty: 36 CAPSULE | Refills: 0 | Status: SHIPPED | OUTPATIENT
Start: 2022-12-29

## 2022-12-29 RX ORDER — ARIPIPRAZOLE 5 MG/1
TABLET ORAL
Qty: 90 TABLET | Refills: 0 | Status: SHIPPED | OUTPATIENT
Start: 2022-12-29 | End: 2023-03-10 | Stop reason: SDUPTHER

## 2022-12-30 ENCOUNTER — TELEPHONE (OUTPATIENT)
Dept: FAMILY MEDICINE CLINIC | Facility: CLINIC | Age: 62
End: 2022-12-30
Payer: COMMERCIAL

## 2022-12-31 LAB
QT INTERVAL: 436 MS
QTC INTERVAL: 449 MS

## 2023-01-03 RX ORDER — DICLOFENAC SODIUM 10 MG/G
GEL TOPICAL
Qty: 100 G | Refills: 0 | Status: SHIPPED | OUTPATIENT
Start: 2023-01-03

## 2023-01-03 RX ORDER — FLUTICASONE PROPIONATE 50 MCG
SPRAY, SUSPENSION (ML) NASAL
Qty: 16 ML | Refills: 0 | Status: SHIPPED | OUTPATIENT
Start: 2023-01-03 | End: 2023-01-06

## 2023-01-03 RX ORDER — HYDROCHLOROTHIAZIDE 12.5 MG/1
TABLET ORAL
Qty: 30 TABLET | Refills: 1 | Status: SHIPPED | OUTPATIENT
Start: 2023-01-03 | End: 2023-01-27

## 2023-01-03 NOTE — TELEPHONE ENCOUNTER
Refill Request   Voltaren gel   Last filled 12/6/2022  Last office visit 12/21/2022 with Meera SPENCE

## 2023-01-04 DIAGNOSIS — R51.9 ACUTE NONINTRACTABLE HEADACHE, UNSPECIFIED HEADACHE TYPE: ICD-10-CM

## 2023-01-04 RX ORDER — BUTALBITAL, ACETAMINOPHEN AND CAFFEINE 50; 325; 40 MG/1; MG/1; MG/1
TABLET ORAL
Qty: 6 TABLET | Refills: 0 | Status: SHIPPED | OUTPATIENT
Start: 2023-01-04

## 2023-01-06 ENCOUNTER — HOSPITAL ENCOUNTER (OUTPATIENT)
Facility: HOSPITAL | Age: 63
Discharge: HOME | End: 2023-01-11
Attending: STUDENT IN AN ORGANIZED HEALTH CARE EDUCATION/TRAINING PROGRAM | Admitting: INTERNAL MEDICINE
Payer: COMMERCIAL

## 2023-01-06 ENCOUNTER — APPOINTMENT (OUTPATIENT)
Dept: GENERAL RADIOLOGY | Facility: HOSPITAL | Age: 63
End: 2023-01-06
Payer: COMMERCIAL

## 2023-01-06 DIAGNOSIS — D50.0 IRON DEFICIENCY ANEMIA DUE TO CHRONIC BLOOD LOSS: ICD-10-CM

## 2023-01-06 DIAGNOSIS — Z74.09 IMPAIRED FUNCTIONAL MOBILITY, BALANCE, GAIT, AND ENDURANCE: ICD-10-CM

## 2023-01-06 DIAGNOSIS — R07.9 CHEST PAIN, UNSPECIFIED TYPE: Primary | ICD-10-CM

## 2023-01-06 DIAGNOSIS — Z78.9 IMPAIRED MOBILITY AND ADLS: ICD-10-CM

## 2023-01-06 DIAGNOSIS — Z74.09 IMPAIRED MOBILITY AND ADLS: ICD-10-CM

## 2023-01-06 LAB
ALBUMIN SERPL-MCNC: 4.1 G/DL (ref 3.5–5.2)
ALBUMIN/GLOB SERPL: 1.4 G/DL
ALP SERPL-CCNC: 120 U/L (ref 39–117)
ALT SERPL W P-5'-P-CCNC: 18 U/L (ref 1–33)
ANION GAP SERPL CALCULATED.3IONS-SCNC: 9 MMOL/L (ref 5–15)
AST SERPL-CCNC: 14 U/L (ref 1–32)
BASOPHILS # BLD AUTO: 0.05 10*3/MM3 (ref 0–0.2)
BASOPHILS NFR BLD AUTO: 0.4 % (ref 0–1.5)
BILIRUB SERPL-MCNC: 0.3 MG/DL (ref 0–1.2)
BUN SERPL-MCNC: 13 MG/DL (ref 8–23)
BUN/CREAT SERPL: 12.7 (ref 7–25)
CALCIUM SPEC-SCNC: 9.6 MG/DL (ref 8.6–10.5)
CHLORIDE SERPL-SCNC: 100 MMOL/L (ref 98–107)
CO2 SERPL-SCNC: 30 MMOL/L (ref 22–29)
CREAT SERPL-MCNC: 1.02 MG/DL (ref 0.57–1)
DEPRECATED RDW RBC AUTO: 41.6 FL (ref 37–54)
EGFRCR SERPLBLD CKD-EPI 2021: 62.7 ML/MIN/1.73
EOSINOPHIL # BLD AUTO: 0.36 10*3/MM3 (ref 0–0.4)
EOSINOPHIL NFR BLD AUTO: 2.9 % (ref 0.3–6.2)
ERYTHROCYTE [DISTWIDTH] IN BLOOD BY AUTOMATED COUNT: 13.2 % (ref 12.3–15.4)
GLOBULIN UR ELPH-MCNC: 3 GM/DL
GLUCOSE BLDC GLUCOMTR-MCNC: 137 MG/DL (ref 70–130)
GLUCOSE BLDC GLUCOMTR-MCNC: 145 MG/DL (ref 70–130)
GLUCOSE SERPL-MCNC: 204 MG/DL (ref 65–99)
HCT VFR BLD AUTO: 39.4 % (ref 34–46.6)
HGB BLD-MCNC: 12.9 G/DL (ref 12–15.9)
HOLD SPECIMEN: NORMAL
HOLD SPECIMEN: NORMAL
IMM GRANULOCYTES # BLD AUTO: 0.04 10*3/MM3 (ref 0–0.05)
IMM GRANULOCYTES NFR BLD AUTO: 0.3 % (ref 0–0.5)
LYMPHOCYTES # BLD AUTO: 2.2 10*3/MM3 (ref 0.7–3.1)
LYMPHOCYTES NFR BLD AUTO: 17.8 % (ref 19.6–45.3)
MCH RBC QN AUTO: 28.7 PG (ref 26.6–33)
MCHC RBC AUTO-ENTMCNC: 32.7 G/DL (ref 31.5–35.7)
MCV RBC AUTO: 87.6 FL (ref 79–97)
MONOCYTES # BLD AUTO: 0.92 10*3/MM3 (ref 0.1–0.9)
MONOCYTES NFR BLD AUTO: 7.4 % (ref 5–12)
NEUTROPHILS NFR BLD AUTO: 71.2 % (ref 42.7–76)
NEUTROPHILS NFR BLD AUTO: 8.82 10*3/MM3 (ref 1.7–7)
NRBC BLD AUTO-RTO: 0 /100 WBC (ref 0–0.2)
NT-PROBNP SERPL-MCNC: 246.8 PG/ML (ref 0–900)
PLATELET # BLD AUTO: 421 10*3/MM3 (ref 140–450)
PMV BLD AUTO: 9.1 FL (ref 6–12)
POTASSIUM SERPL-SCNC: 3.6 MMOL/L (ref 3.5–5.2)
PROT SERPL-MCNC: 7.1 G/DL (ref 6–8.5)
RBC # BLD AUTO: 4.5 10*6/MM3 (ref 3.77–5.28)
SODIUM SERPL-SCNC: 139 MMOL/L (ref 136–145)
TROPONIN T SERPL-MCNC: <0.01 NG/ML (ref 0–0.03)
TROPONIN T SERPL-MCNC: <0.01 NG/ML (ref 0–0.03)
WBC NRBC COR # BLD: 12.39 10*3/MM3 (ref 3.4–10.8)
WHOLE BLOOD HOLD COAG: NORMAL
WHOLE BLOOD HOLD SPECIMEN: NORMAL

## 2023-01-06 PROCEDURE — 96375 TX/PRO/DX INJ NEW DRUG ADDON: CPT

## 2023-01-06 PROCEDURE — 25010000002 HYDROMORPHONE 1 MG/ML SOLUTION: Performed by: STUDENT IN AN ORGANIZED HEALTH CARE EDUCATION/TRAINING PROGRAM

## 2023-01-06 PROCEDURE — 84484 ASSAY OF TROPONIN QUANT: CPT

## 2023-01-06 PROCEDURE — 96374 THER/PROPH/DIAG INJ IV PUSH: CPT

## 2023-01-06 PROCEDURE — G0378 HOSPITAL OBSERVATION PER HR: HCPCS

## 2023-01-06 PROCEDURE — 25010000002 ONDANSETRON PER 1 MG: Performed by: STUDENT IN AN ORGANIZED HEALTH CARE EDUCATION/TRAINING PROGRAM

## 2023-01-06 PROCEDURE — 36415 COLL VENOUS BLD VENIPUNCTURE: CPT

## 2023-01-06 PROCEDURE — 71045 X-RAY EXAM CHEST 1 VIEW: CPT

## 2023-01-06 PROCEDURE — 63710000001 INSULIN DETEMIR PER 5 UNITS: Performed by: PHYSICIAN ASSISTANT

## 2023-01-06 PROCEDURE — 82962 GLUCOSE BLOOD TEST: CPT

## 2023-01-06 PROCEDURE — 80053 COMPREHEN METABOLIC PANEL: CPT

## 2023-01-06 PROCEDURE — 96372 THER/PROPH/DIAG INJ SC/IM: CPT

## 2023-01-06 PROCEDURE — 25010000002 CYANOCOBALAMIN PER 1000 MCG: Performed by: PHYSICIAN ASSISTANT

## 2023-01-06 PROCEDURE — 93005 ELECTROCARDIOGRAM TRACING: CPT

## 2023-01-06 PROCEDURE — 25010000002 HEPARIN (PORCINE) PER 1000 UNITS: Performed by: PHYSICIAN ASSISTANT

## 2023-01-06 PROCEDURE — 83880 ASSAY OF NATRIURETIC PEPTIDE: CPT

## 2023-01-06 PROCEDURE — 84484 ASSAY OF TROPONIN QUANT: CPT | Performed by: STUDENT IN AN ORGANIZED HEALTH CARE EDUCATION/TRAINING PROGRAM

## 2023-01-06 PROCEDURE — 99285 EMERGENCY DEPT VISIT HI MDM: CPT

## 2023-01-06 PROCEDURE — 93005 ELECTROCARDIOGRAM TRACING: CPT | Performed by: STUDENT IN AN ORGANIZED HEALTH CARE EDUCATION/TRAINING PROGRAM

## 2023-01-06 PROCEDURE — 85025 COMPLETE CBC W/AUTO DIFF WBC: CPT

## 2023-01-06 PROCEDURE — 93010 ELECTROCARDIOGRAM REPORT: CPT | Performed by: INTERNAL MEDICINE

## 2023-01-06 RX ORDER — MECLIZINE HYDROCHLORIDE 25 MG/1
25 TABLET ORAL 3 TIMES DAILY PRN
Status: DISCONTINUED | OUTPATIENT
Start: 2023-01-06 | End: 2023-01-11 | Stop reason: HOSPADM

## 2023-01-06 RX ORDER — ISOSORBIDE MONONITRATE 30 MG/1
30 TABLET, EXTENDED RELEASE ORAL DAILY
Status: DISCONTINUED | OUTPATIENT
Start: 2023-01-07 | End: 2023-01-11 | Stop reason: HOSPADM

## 2023-01-06 RX ORDER — SODIUM CHLORIDE 0.9 % (FLUSH) 0.9 %
10 SYRINGE (ML) INJECTION AS NEEDED
Status: DISCONTINUED | OUTPATIENT
Start: 2023-01-06 | End: 2023-01-11 | Stop reason: HOSPADM

## 2023-01-06 RX ORDER — PANTOPRAZOLE SODIUM 40 MG/1
40 TABLET, DELAYED RELEASE ORAL EVERY MORNING
Status: DISCONTINUED | OUTPATIENT
Start: 2023-01-07 | End: 2023-01-11 | Stop reason: HOSPADM

## 2023-01-06 RX ORDER — SODIUM CHLORIDE 0.9 % (FLUSH) 0.9 %
10 SYRINGE (ML) INJECTION EVERY 12 HOURS SCHEDULED
Status: DISCONTINUED | OUTPATIENT
Start: 2023-01-06 | End: 2023-01-11 | Stop reason: HOSPADM

## 2023-01-06 RX ORDER — FOLIC ACID 1 MG/1
1000 TABLET ORAL DAILY
Status: DISCONTINUED | OUTPATIENT
Start: 2023-01-07 | End: 2023-01-11 | Stop reason: HOSPADM

## 2023-01-06 RX ORDER — ONDANSETRON 4 MG/1
8 TABLET, FILM COATED ORAL EVERY 8 HOURS PRN
Status: DISCONTINUED | OUTPATIENT
Start: 2023-01-06 | End: 2023-01-11 | Stop reason: HOSPADM

## 2023-01-06 RX ORDER — METOPROLOL SUCCINATE 50 MG/1
50 TABLET, EXTENDED RELEASE ORAL DAILY
Status: DISCONTINUED | OUTPATIENT
Start: 2023-01-07 | End: 2023-01-11 | Stop reason: HOSPADM

## 2023-01-06 RX ORDER — METOCLOPRAMIDE 10 MG/1
10 TABLET ORAL 4 TIMES DAILY PRN
Status: DISCONTINUED | OUTPATIENT
Start: 2023-01-06 | End: 2023-01-06

## 2023-01-06 RX ORDER — ASPIRIN 325 MG
325 TABLET, DELAYED RELEASE (ENTERIC COATED) ORAL DAILY
Status: DISCONTINUED | OUTPATIENT
Start: 2023-01-07 | End: 2023-01-11 | Stop reason: HOSPADM

## 2023-01-06 RX ORDER — ATORVASTATIN CALCIUM 40 MG/1
80 TABLET, FILM COATED ORAL DAILY
Status: DISCONTINUED | OUTPATIENT
Start: 2023-01-07 | End: 2023-01-11 | Stop reason: HOSPADM

## 2023-01-06 RX ORDER — ONDANSETRON 2 MG/ML
4 INJECTION INTRAMUSCULAR; INTRAVENOUS ONCE
Status: COMPLETED | OUTPATIENT
Start: 2023-01-06 | End: 2023-01-06

## 2023-01-06 RX ORDER — SODIUM CHLORIDE 9 MG/ML
40 INJECTION, SOLUTION INTRAVENOUS AS NEEDED
Status: DISCONTINUED | OUTPATIENT
Start: 2023-01-06 | End: 2023-01-11 | Stop reason: HOSPADM

## 2023-01-06 RX ORDER — RANOLAZINE 500 MG/1
500 TABLET, EXTENDED RELEASE ORAL EVERY 12 HOURS SCHEDULED
Status: DISCONTINUED | OUTPATIENT
Start: 2023-01-06 | End: 2023-01-11 | Stop reason: HOSPADM

## 2023-01-06 RX ORDER — HEPARIN SODIUM 5000 [USP'U]/ML
5000 INJECTION, SOLUTION INTRAVENOUS; SUBCUTANEOUS EVERY 8 HOURS SCHEDULED
Status: DISCONTINUED | OUTPATIENT
Start: 2023-01-06 | End: 2023-01-11 | Stop reason: HOSPADM

## 2023-01-06 RX ORDER — LEVOTHYROXINE SODIUM 0.03 MG/1
25 TABLET ORAL
Status: DISCONTINUED | OUTPATIENT
Start: 2023-01-07 | End: 2023-01-11 | Stop reason: HOSPADM

## 2023-01-06 RX ORDER — AMLODIPINE BESYLATE 5 MG/1
5 TABLET ORAL DAILY
Status: DISCONTINUED | OUTPATIENT
Start: 2023-01-07 | End: 2023-01-11 | Stop reason: HOSPADM

## 2023-01-06 RX ORDER — CYANOCOBALAMIN 1000 UG/ML
1000 INJECTION, SOLUTION INTRAMUSCULAR; SUBCUTANEOUS
Status: DISCONTINUED | OUTPATIENT
Start: 2023-01-06 | End: 2023-01-11 | Stop reason: HOSPADM

## 2023-01-06 RX ORDER — MELOXICAM 15 MG/1
15 TABLET ORAL DAILY
Status: DISCONTINUED | OUTPATIENT
Start: 2023-01-07 | End: 2023-01-11 | Stop reason: HOSPADM

## 2023-01-06 RX ORDER — FUROSEMIDE 40 MG/1
40 TABLET ORAL DAILY
Status: DISCONTINUED | OUTPATIENT
Start: 2023-01-07 | End: 2023-01-11 | Stop reason: HOSPADM

## 2023-01-06 RX ORDER — ARIPIPRAZOLE 5 MG/1
5 TABLET ORAL DAILY
Status: DISCONTINUED | OUTPATIENT
Start: 2023-01-07 | End: 2023-01-11 | Stop reason: HOSPADM

## 2023-01-06 RX ORDER — DEXTROSE MONOHYDRATE 25 G/50ML
25 INJECTION, SOLUTION INTRAVENOUS
Status: DISCONTINUED | OUTPATIENT
Start: 2023-01-06 | End: 2023-01-11 | Stop reason: HOSPADM

## 2023-01-06 RX ORDER — METHOCARBAMOL 500 MG/1
500 TABLET, FILM COATED ORAL 2 TIMES DAILY PRN
Status: DISCONTINUED | OUTPATIENT
Start: 2023-01-06 | End: 2023-01-11 | Stop reason: HOSPADM

## 2023-01-06 RX ORDER — ACETAMINOPHEN 325 MG/1
650 TABLET ORAL EVERY 6 HOURS PRN
Status: DISCONTINUED | OUTPATIENT
Start: 2023-01-06 | End: 2023-01-11 | Stop reason: HOSPADM

## 2023-01-06 RX ORDER — NICOTINE POLACRILEX 4 MG
15 LOZENGE BUCCAL
Status: DISCONTINUED | OUTPATIENT
Start: 2023-01-06 | End: 2023-01-11 | Stop reason: HOSPADM

## 2023-01-06 RX ORDER — INSULIN ASPART 100 [IU]/ML
0-14 INJECTION, SOLUTION INTRAVENOUS; SUBCUTANEOUS
Status: DISCONTINUED | OUTPATIENT
Start: 2023-01-06 | End: 2023-01-11 | Stop reason: HOSPADM

## 2023-01-06 RX ORDER — NITROGLYCERIN 0.4 MG/1
0.4 TABLET SUBLINGUAL
Status: DISCONTINUED | OUTPATIENT
Start: 2023-01-06 | End: 2023-01-11 | Stop reason: HOSPADM

## 2023-01-06 RX ORDER — ASPIRIN 325 MG
325 TABLET ORAL ONCE
Status: DISCONTINUED | OUTPATIENT
Start: 2023-01-06 | End: 2023-01-11 | Stop reason: HOSPADM

## 2023-01-06 RX ORDER — HYDROCHLOROTHIAZIDE 12.5 MG/1
12.5 TABLET ORAL DAILY
Status: DISCONTINUED | OUTPATIENT
Start: 2023-01-07 | End: 2023-01-11 | Stop reason: HOSPADM

## 2023-01-06 RX ORDER — CLOPIDOGREL BISULFATE 75 MG/1
75 TABLET ORAL DAILY
Status: DISCONTINUED | OUTPATIENT
Start: 2023-01-07 | End: 2023-01-11 | Stop reason: HOSPADM

## 2023-01-06 RX ORDER — VENLAFAXINE HYDROCHLORIDE 75 MG/1
75 CAPSULE, EXTENDED RELEASE ORAL
Status: DISCONTINUED | OUTPATIENT
Start: 2023-01-07 | End: 2023-01-11 | Stop reason: HOSPADM

## 2023-01-06 RX ADMIN — INSULIN DETEMIR 35 UNITS: 100 INJECTION, SOLUTION SUBCUTANEOUS at 21:15

## 2023-01-06 RX ADMIN — RANOLAZINE 500 MG: 500 TABLET, FILM COATED, EXTENDED RELEASE ORAL at 21:16

## 2023-01-06 RX ADMIN — Medication 10 ML: at 21:16

## 2023-01-06 RX ADMIN — HYDROMORPHONE HYDROCHLORIDE 0.5 MG: 1 INJECTION, SOLUTION INTRAMUSCULAR; INTRAVENOUS; SUBCUTANEOUS at 14:23

## 2023-01-06 RX ADMIN — ACETAMINOPHEN 650 MG: 325 TABLET ORAL at 21:16

## 2023-01-06 RX ADMIN — HEPARIN SODIUM 5000 UNITS: 5000 INJECTION INTRAVENOUS; SUBCUTANEOUS at 21:15

## 2023-01-06 RX ADMIN — CYANOCOBALAMIN 1000 MCG: 1000 INJECTION, SOLUTION INTRAMUSCULAR at 21:15

## 2023-01-06 RX ADMIN — ONDANSETRON 4 MG: 2 INJECTION INTRAMUSCULAR; INTRAVENOUS at 14:22

## 2023-01-06 NOTE — ED NOTES
Nursing report ED to floor  Ariana Martinez  63 y.o.  female    HPI:   Chief Complaint   Patient presents with    Chest Pain       Admitting doctor:   Raymon Lazcano MD    Consulting provider(s):  Consults       No orders found from 12/8/2022 to 1/7/2023.             Admitting diagnosis:   The encounter diagnosis was Chest pain, unspecified type.    Code status:   Current Code Status       Date Active Code Status Order ID Comments User Context       Prior            Allergies:   Seroquel [quetiapine], Avandia [rosiglitazone], Morphine and related, and Oxycodone    Intake and Output  No intake or output data in the 24 hours ending 01/06/23 1652    Weight:       01/06/23  1159   Weight: 118 kg (260 lb)       Most recent vitals:   Vitals:    01/06/23 1417 01/06/23 1445 01/06/23 1531 01/06/23 1618   BP: 136/63 127/58 125/60 124/58   BP Location:       Patient Position:       Pulse: 56 56 56 58   Resp: 18  18 18   Temp:       TempSrc:       SpO2: 97% 92% 95% 95%   Weight:       Height:         Oxygen Therapy: RA    Active LDAs/IV Access:   Lines, Drains & Airways       Active LDAs       Name Placement date Placement time Site Days    Peripheral IV 12/22/22 1310 Right  12/22/22  1310  --  Breast  15    Peripheral IV 01/06/23 1214 Anterior;Left Forearm 01/06/23  1214  Forearm  less than 1                    Labs (abnormal labs have a star):   Labs Reviewed   COMPREHENSIVE METABOLIC PANEL - Abnormal; Notable for the following components:       Result Value    Glucose 204 (*)     Creatinine 1.02 (*)     CO2 30.0 (*)     Alkaline Phosphatase 120 (*)     All other components within normal limits    Narrative:     GFR Normal >60  Chronic Kidney Disease <60  Kidney Failure <15     CBC WITH AUTO DIFFERENTIAL - Abnormal; Notable for the following components:    WBC 12.39 (*)     Lymphocyte % 17.8 (*)     Neutrophils, Absolute 8.82 (*)     Monocytes, Absolute 0.92 (*)     All other components within normal limits   TROPONIN  (IN-HOUSE) - Normal    Narrative:     Troponin T Reference Range:  <= 0.03 ng/mL-   Negative for AMI  >0.03 ng/mL-     Abnormal for myocardial necrosis.  Clinicians would have to utilize clinical acumen, EKG, Troponin and serial changes to determine if it is an Acute Myocardial Infarction or myocardial injury due to an underlying chronic condition.       Results may be falsely decreased if patient taking Biotin.     TROPONIN (IN-HOUSE) - Normal    Narrative:     Troponin T Reference Range:  <= 0.03 ng/mL-   Negative for AMI  >0.03 ng/mL-     Abnormal for myocardial necrosis.  Clinicians would have to utilize clinical acumen, EKG, Troponin and serial changes to determine if it is an Acute Myocardial Infarction or myocardial injury due to an underlying chronic condition.       Results may be falsely decreased if patient taking Biotin.     BNP (IN-HOUSE) - Normal    Narrative:     Among patients with dyspnea, NT-proBNP is highly sensitive for the detection of acute congestive heart failure. In addition NT-proBNP of <300 pg/ml effectively rules out acute congestive heart failure with 99% negative predictive value.     RAINBOW DRAW    Narrative:     The following orders were created for panel order [facility] Draw.  Procedure                               Abnormality         Status                     ---------                               -----------         ------                     Green Top (Gel)[[id number]]                                  Final result               Lavender Top[[id number]]                                     Final result               Gold Top - SST[[id number]]                                   Final result               Light Blue Top[[id number]]                                   Final result                 Please view results for these tests on the individual orders.   CBC AND DIFFERENTIAL    Narrative:     The following orders were created for panel order CBC & Differential.  Procedure                                Abnormality         Status                     ---------                               -----------         ------                     CBC Auto Differential[[id number]]        Abnormal            Final result                 Please view results for these tests on the individual orders.   GREEN TOP   LAVENDER TOP   GOLD TOP - SST   LIGHT BLUE TOP       Meds given in ED:   Medications   sodium chloride 0.9 % flush 10 mL (has no administration in time range)   aspirin tablet 325 mg (0 mg Oral Hold 1/6/23 1329)   ondansetron (ZOFRAN) injection 4 mg (4 mg Intravenous Given 1/6/23 1422)   HYDROmorphone (DILAUDID) injection 0.5 mg (0.5 mg Intravenous Given 1/6/23 1423)           NIH Stroke Scale:       Isolation/Infection(s):  No active isolations   No active infections     COVID Testing  Collected - not collected  Resulted n/a    Nursing report ED to floor:  Mental status: alert and oriented x4  Ambulatory status: ad ar  Precautions: none    ED nurse phone [phone]

## 2023-01-06 NOTE — H&P
[facility] Medicine  HISTORY AND PHYSICAL      Date of Admission: 2023  Primary Care Physician: Dolly Foss APRN    Subjective     Chief Complaint: Chest pain    HPI: Patient presented to the emergency department on 2023 with chief complaint of chest pain.  She describes the chest pain as midepigastric, radiating around her right lower rib cage/upper abdomen to her back.  She denies associated radiation.  She complains of mild associated nausea but no vomiting.  Also felt short of breath at times.  She was laying in bed when the pain started.  The pain was continuous, did not resolve until she received 1 sublingual nitroglycerin here in the emergency department.  She is not actively having pain in the emergency department.  She has a long history of cardiac issues including history of CABG, history of PCI, CHF.  Most recent left heart cath in 2022 showing the followin.  One-vessel obstructive coronary artery disease involving chronic total occlusion of the LAD.  LIMA to LAD is patent, target vessel is small in caliber with severe diffuse disease but not amenable to PCI  2.  Patent stents in the LAD extending into the diagonal  3.  Mild disease in the RCA and left circumflex which is unchanged from before  4.  Normal left-sided filling pressures     He follows with Dr. Snowden outpatient.  She also has a pertinent past medical history including PUD and gastroparesis related to type 2 diabetes.  Other chronic medical problems include depression, GERD, hyperlipidemia, obesity, Ménière's, pulmonary hypertension, iron deficiency anemia, tricuspid valve regurgitation, hypertension, occlusion and stenosis of bilateral carotid arteries, status post BKA left.      Review of Systems   Review of Systems   Constitutional: Negative.    HENT: Negative.    Eyes: Negative.    Respiratory: Negative.    Cardiovascular: Positive for chest pain egative.     Gastrointestinal: Negative.    Endocrine: Negative.    Genitourinary: Negative.    Musculoskeletal: Negative.    Skin: Negative.    Neurological: Negative.    Psychiatric/Behavioral: Negative.      Otherwise complete ROS reviewed and negative except as mentioned in the HPI.    Past Medical History:   Past Medical History:   Diagnosis Date   • Acute bacterial endocarditis 3/13/2021   • Angina, class IV ([facility])    • Anxiety    • Anxiety and depression    • Arthritis    • Benign paroxysmal positional vertigo    • Bladder disorder     has bladder stimulator   • Carpal tunnel syndrome    • CHF (congestive heart failure) ([facility])    • Chronic pain    • Coronary atherosclerosis     hx CABG 2005.  is followed by Dr Kwon   • Depression    • Diabetes mellitus ([facility])     Type 2, controlled   • Diabetic polyneuropathy ([facility])    • Disease of thyroid gland    • Elevated cholesterol    • Female stress incontinence    • Foot pain, left    • Full dentures    • Gastroparesis    • GERD (gastroesophageal reflux disease)    • Hyperlipidemia    • Hypertension    • Low back pain    • Malaise and fatigue    • Multiple joint pain    • Obesity     Refuses to be weighed   • Occlusion and stenosis of bilateral carotid arteries 6/18/2021   • Otalgia     Both   • Perforation of tympanic membrane     Left   • Postoperative wound infection    • Vitamin D deficiency    • Wears glasses     reading     Past Surgical History:  Past Surgical History:   Procedure Laterality Date   • ABDOMINAL SURGERY     • AMPUTATION FOOT     • ANGIOPLASTY      coronary   • ANKLE ARTHROSCOPY Left 2/26/2021    Procedure: Left foot hardware removal, ankle arthroscopy, bone grafting , left foot exostectomy;  Surgeon: Ignacio Lord DPM;  Location: [facility];  Service: Podiatry;  Laterality: Left;   • BREAST BIOPSY Right    • CARDIAC CATHETERIZATION     • CARDIAC CATHETERIZATION N/A 6/20/2017    Procedure: Right Heart Cath;  Surgeon: Can Kwon MD PhD;   Location: [facility] CATH INVASIVE LOCATION;  Service:    • CARDIAC CATHETERIZATION N/A 2/18/2020    Procedure: Left Heart Cath;  Surgeon: Catalina Cooper MD;  Location: [facility] CATH INVASIVE LOCATION;  Service: Cardiology;  Laterality: N/A;   • CARDIAC CATHETERIZATION N/A 4/6/2022    Procedure: Left Heart Cath;  Surgeon: Sheryl Navas MD;  Location: [facility] CATH INVASIVE LOCATION;  Service: Cardiology;  Laterality: N/A;   • CARPAL TUNNEL RELEASE     • CHOLECYSTECTOMY     • COLONOSCOPY N/A 6/24/2020    Procedure: COLONOSCOPY;  Surgeon: Julián Maldonado MD;  Location: [facility] ENDOSCOPY;  Service: Gastroenterology;  Laterality: N/A;   • CORONARY ARTERY BYPASS GRAFT  2005   • ENDOSCOPY N/A 10/19/2018    Procedure: ESOPHAGOGASTRODUODENOSCOPY possible dilation;  Surgeon: Julián Maldonado MD;  Location: [facility] ENDOSCOPY;  Service: Gastroenterology   • ENDOSCOPY N/A 6/24/2020    Procedure: ESOPHAGOGASTRODUODENOSCOPY WED appt please;  Surgeon: Julián Maldonado MD;  Location: [facility] ENDOSCOPY;  Service: Gastroenterology;  Laterality: N/A;   • ENDOSCOPY N/A 6/10/2022    Procedure: ESOPHAGOGASTRODUODENOSCOPY   room 380;  Surgeon: Jeremiah Wilkins MD;  Location: [facility] ENDOSCOPY;  Service: Gastroenterology;  Laterality: N/A;   • ENDOSCOPY AND COLONOSCOPY     • FOOT SURGERY      Toes   • FOOT SURGERY     • GASTRIC BANDING      Revision, laparoscopic   • HYSTERECTOMY     • INCISION AND DRAINAGE LEG Left 3/12/2021    Procedure: Left ankle arthroscopic irrigation and debridement, screw removal;  Surgeon: Ignacio Lord DPM;  Location: [facility] OR;  Service: Podiatry;  Laterality: Left;   • MOUTH SURGERY     • SALPINGO OOPHORECTOMY     • SHOULDER SURGERY     • SUBTALAR ARTHRODESIS Left 1/16/2019    Procedure: LEFT FOOT HARDWARE REMOVAL, FIFTH METATARSAL , OPEN REDUCTION INTERNAL FIXATION, CALCANEAL OSTEOTOMY;  Surgeon: Ignacio Lord DPM;  Location: [facility] OR;  Service: Podiatry   • SUBTALAR ARTHRODESIS Left 10/16/2019     Procedure: foot hardware removal, subtalar joint fusion  possible de/reattachment of achilles tendon        (c-arm);  Surgeon: Ignacio Lord DPM;  Location: [facility];  Service: Podiatry   • SUBTALAR ARTHRODESIS Left 9/30/2020    Procedure: subtalar, talonavicular joint arthrodesis.  Removal hardware.          (c-arm);  Surgeon: Ignacio Lord DPM;  Location: [facility];  Service: Podiatry;  Laterality: Left;   • TRANSESOPHAGEAL ECHOCARDIOGRAM ([place]AMONTE)      With color flow     Social History:  reports that she has never smoked. She has never used smokeless tobacco. She reports that she does not drink alcohol and does not use drugs.    Family History: family history includes Diabetes in her sister, sister, sister, sister, sister, sister and another family member; Heart disease in her mother, sister, sister, and another family member; Hypertension in her mother, sister, sister, and another family member; Stroke in her mother.      Allergies:  Allergies   Allergen Reactions   • Seroquel [Quetiapine] Anaphylaxis   • Avandia [Rosiglitazone] Swelling   • Morphine And Related Hallucinations   • Oxycodone Hallucinations       Medications:  Prior to Admission medications    Medication Sig Start Date End Date Taking? Authorizing Provider   amLODIPine (NORVASC) 5 MG tablet Take 1 tablet by mouth Daily. 6/27/22  Yes Kimberly Ferguson APRN   ARIPiprazole (ABILIFY) 5 MG tablet TAKE 1 TABLET BY MOUTH EVERY DAY 12/29/22  Yes Dolly Foss APRN   aspirin  MG tablet Take 1 tablet by mouth Daily. 2/8/22  Yes Mahin Esteves MD   atorvastatin (LIPITOR) 80 MG tablet Take 1 tablet by mouth Daily. 9/7/22  Yes Dolly Foss APRN B-D ULTRAFINE III SHORT PEN 31G X 8 MM misc USE TO INJECT 5 TIMES A DAY 11/11/22  Yes Elvis Gamboa APRN B-D ULTRAFINE III SHORT PEN 31G X 8 MM misc USE UP TO 4 (FOUR) TIMES DAILY WITH INSULIN AS DIRECTED. 12/27/22  Yes Dolly Foss APRN   BD SHARPS CONTAINER HOME misc 1  each Take As Directed. 9/7/18  Yes Francisca Fong MD   Blood Glucose Monitoring Suppl (ONE TOUCH ULTRA MINI) w/Device kit Patient will need the Accu-Check Avitio meter 10/18/21  Yes Kimberly Ferguson APRN   butalbital-acetaminophen-caffeine (FIORICET, ESGIC) -40 MG per tablet Take one to two tablets by mouth per headache episode as needed. 1/4/23  Yes Dolly Foss APRN   Calcium Citrate-Vitamin D 250-200 MG-UNIT tablet Take 2 tablets by mouth 2 (two) times a day.   Yes Provider, MD Esther   clopidogrel (PLAVIX) 75 MG tablet Take 1 tablet by mouth Daily. 10/3/22  Yes Dolly Foss APRN   [facility] Diclofenac Sodium 1 % gel gel APPLY 4 G TOPICALLY TO THE APPROPRIATE AREA AS DIRECTED 2 (TWO) TIMES A DAY. SMALL AMOUNT TO AFFECTED AREA 1/3/23  Yes Rohan Lazo MD   folic acid (FOLVITE) 1 MG tablet TAKE 1 TABLET BY MOUTH EVERY DAY 8/22/22  Yes Teressa Hammond APRN   furosemide (LASIX) 40 MG tablet TAKE 1 TABLET BY MOUTH EVERY DAY 11/16/22  Yes Dolly Foss APRN   gabapentin (NEURONTIN) 100 MG capsule Take 1 capsule by mouth Every 12 (Twelve) Hours. 11/23/22  Yes Dolly Foss APRN   glucose blood (Accu-Chek Guide) test strip 1 each by Other route 4 (Four) Times a Day. To test blood sugar 4X daily. For  Dx E11.65 5/2/22  Yes Kimberly Ferguson APRN   glucose monitor monitoring kit 1 each Daily. accucheck eve meter, E11.9 6/11/20  Yes Elvis Gamboa APRN   hydroCHLOROthiazide (HYDRODIURIL) 12.5 MG tablet TAKE 1 TABLET BY MOUTH EVERY DAY 1/3/23  Yes Dolly Foss APRN   HYDROcodone-acetaminophen (NORCO) 7.5-325 MG per tablet Take 1 tablet by mouth Every 6 (Six) Hours As Needed for Moderate Pain. 12/7/22  Yes Dolly Foss APRN   insulin aspart (NovoLOG FlexPen) 100 UNIT/ML solution pen-injector sc pen Inject up to 45 units  3 TIMES A DAY BEFORE MEALS 10/3/22  Yes Elvis Gamboa APRN   Insulin Glargine (BASAGLAR KWIKPEN) 100 UNIT/ML injection pen Inject 40  "units into the appropriate area every morning and 35 units into the appropriate area every night 10/24/22  Yes Elvis Gamboa APRN   isosorbide mononitrate (IMDUR) 30 MG 24 hr tablet TAKE 1 TABLET BY MOUTH EVERY DAY 12/21/22  Yes Dolly Foss APRN   Lancets (accu-chek soft touch) lancets As directed 10/18/21  Yes Kimberly Ferguson APRN   levothyroxine (SYNTHROID, LEVOTHROID) 25 MCG tablet Take 1 tablet by mouth Daily. 10/3/22  Yes Elvis Gamboa APRN   meloxicam (MOBIC) 15 MG tablet TAKE 1 TABLET BY MOUTH EVERY DAY WITH FOOD 12/7/22  Yes Rohan Lazo MD   methocarbamol (ROBAXIN) 500 MG tablet TAKE 1 TABLET BY MOUTH 2 TIMES A DAY AS NEEDED FOR MUSCLE SPASMS. 6/7/22  Yes Kimberly Ferguson APRN   metoclopramide (REGLAN) 10 MG tablet TAKE 1 TABLET BY MOUTH 4 (FOUR) TIMES A DAY AS NEEDED (NAUSEA). 11/29/22  Yes Dolly Foss APRN   metoclopramide (REGLAN) 5 MG tablet Take 1 tablet by mouth 3 (Three) Times a Day As Needed (vomiting). 12/27/22  Yes Addi Johnson MD   metoprolol succinate XL (TOPROL-XL) 50 MG 24 hr tablet Take 1 tablet by mouth Daily. Dose increased 5/24/21 12/27/22  Yes Dolly Foss APRN   Needle, Disp, (Easy Touch FlipLock Needles) 25G X 1-1/2\" misc 1 each Every 28 (Twenty-Eight) Days. For administering B12 cyanocobalomin. Dx vitamin B12 deficiency. 5/24/22  Yes Kimberly Ferguson APRN   Needle Disp, (Hypodermic Needle) 20G X 1\" misc 1 each Every 28 (Twenty-Eight) Days. For drawing up B12 for injection monthly, change back to smaller gauge needle prior to injection. Dx Vitamin B12  Deficiency. 5/24/22  Yes Kimberly Ferguson APRN   ondansetron (ZOFRAN) 8 MG tablet Take 1 tablet by mouth Every 8 (Eight) Hours As Needed for Nausea or Vomiting. 12/7/22  Yes Dolly Foss APRN   ondansetron ODT (ZOFRAN-ODT) 4 MG disintegrating tablet Place 1 tablet on the tongue 4 (Four) Times a Day As Needed for Nausea or Vomiting. 12/27/22  Yes Addi Johnson MD " "  pantoprazole (PROTONIX) 20 MG EC tablet Take 2 tablets by mouth Daily. 7/12/22  Yes Kimberly Ferguson APRN   ranolazine (RANEXA) 500 MG 12 hr tablet Take 1 tablet by mouth Every 12 (Twelve) Hours. 6/29/22  Yes Bill Gibson MD   Syringe 20G X 1-1/2\" 3 ML misc 1 each Every 28 (Twenty-Eight) Days. For injection cyanocobalomin, Dx vit B12 deficiency. 5/24/22  Yes Kimberly Ferguson APRN   venlafaxine XR (EFFEXOR-XR) 75 MG 24 hr capsule TAKE 1 CAPSULE BY MOUTH DAILY WITH BREAKFAST. 12/14/22  Yes Dolly Foss APRN   vitamin D (ERGOCALCIFEROL) 1.25 MG (50799 UT) capsule capsule TAKE 1 CAPSULE BY MOUTH EVERY 7 DAYS. 12/29/22  Yes Dolly Foss APRN   cyanocobalamin 1000 MCG/ML injection Inject 1 mL into the appropriate muscle as directed by prescriber Every 28 (Twenty-Eight) Days. 10/28/22   Dolly Foss APRN   meclizine (ANTIVERT) 25 MG tablet Take 1 tablet by mouth 3 (Three) Times a Day As Needed for dizziness. 12/14/17   Evon Hernandez APRN   naloxone (NARCAN) 0.4 MG/ML injection Infuse 0.5 mL into a venous catheter Every 5 (Five) Minutes As Needed for Opioid Reversal. 3/13/21   Nick Faye MD   nitroglycerin (NITROSTAT) 0.4 MG SL tablet Place 1 tablet under the tongue Every 5 (Five) Minutes As Needed for Chest Pain. Take no more than 3 doses in 15 minutes. 4/26/22   Kimberly Ferguson APRN   fluticasone (FLONASE) 50 MCG/ACT nasal spray INSTILL 2 SPRAYS IN EACH NOSTRIL AS DIRECTED BY PROVIDER DAILY 1/3/23 1/6/23  Dolly Foss APRN   melatonin 3 MG tablet Take 2 tablets by mouth Every Night. 11/18/22 1/6/23  Dolly Foss APRN   montelukast (SINGULAIR) 10 MG tablet Take 10 mg by mouth Every Night. 11/8/22 1/6/23  Provider, Historical, MD     I have utilized all available immediate resources to obtain, update, and review the patient's current medications.    Objective     Vital Signs: /60   Pulse 56   Temp 98.1 °F (36.7 °C) (Oral)   Resp 18   Ht 172.7 cm (68\")  "  Wt 118 kg (260 lb)   SpO2 95%   BMI 39.53 kg/m²   Physical Exam   Physical Exam  Vitals and nursing note reviewed.   Constitutional:       General: No acute distress.     Appearance: Normal appearance.   HENT:      Head: Normocephalic and atraumatic.      Right Ear: External ear normal.      Left Ear: External ear normal.      Nose: Nose normal.      Mouth/Throat:      Mouth: Mucous membranes are moist.   Eyes:      Conjunctivae/sclerae: Conjunctivae normal.      Pupils: Pupils are equal, round, and reactive to light.   Cardiovascular:      Rate and Rhythm: Normal rate and regular rhythm.   Pulmonary:      Effort: Pulmonary effort is normal.      Breath sounds: Normal breath sounds.   Abdominal:      General: Abdomen is flat.      Palpations: Abdomen is soft.      Tenderness: Pain reproducible with palpation of midepigastric region, right upper quadrant without rebound or guarding  Musculoskeletal:         General: No signs of injury. Normal range of motion.      Cervical back: No tenderness.   Skin:     General: Skin is warm and dry.   Neurological:      General: No focal deficit present.      Mental Status: Alert and oriented to person, place, and time.   Psychiatric:         Mood and Affect: Mood normal.         Behavior: Behavior normal.         Results Reviewed:  Lab Results (last 24 hours)     Procedure Component Value Units Date/Time    Troponin [[id number]]  (Normal) Collected: 01/06/23 1421    Specimen: Blood Updated: 01/06/23 1450     Troponin T <0.010 ng/mL     Narrative:      Troponin T Reference Range:  <= 0.03 ng/mL-   Negative for AMI  >0.03 ng/mL-     Abnormal for myocardial necrosis.  Clinicians would have to utilize clinical acumen, EKG, Troponin and serial changes to determine if it is an Acute Myocardial Infarction or myocardial injury due to an underlying chronic condition.       Results may be falsely decreased if patient taking Biotin.      Comprehensive Metabolic Panel [[id number]]   (Abnormal) Collected: 01/06/23 1051    Specimen: Blood Updated: 01/06/23 1241     Glucose 204 mg/dL      BUN 13 mg/dL      Creatinine 1.02 mg/dL      Sodium 139 mmol/L      Potassium 3.6 mmol/L      Chloride 100 mmol/L      CO2 30.0 mmol/L      Calcium 9.6 mg/dL      Total Protein 7.1 g/dL      Albumin 4.1 g/dL      ALT (SGPT) 18 U/L      AST (SGOT) 14 U/L      Alkaline Phosphatase 120 U/L      Total Bilirubin 0.3 mg/dL      Globulin 3.0 gm/dL      A/G Ratio 1.4 g/dL      BUN/Creatinine Ratio 12.7     Anion Gap 9.0 mmol/L      eGFR 62.7 mL/min/1.73      Comment: National Kidney Foundation and American Society of Nephrology (ASN) Task Force recommended calculation based on the Chronic Kidney Disease Epidemiology Collaboration (CKD-EPI) equation refit without adjustment for race.       Narrative:      GFR Normal >60  Chronic Kidney Disease <60  Kidney Failure <15      Troponin [[id number]]  (Normal) Collected: 01/06/23 1051    Specimen: Blood Updated: 01/06/23 1239     Troponin T <0.010 ng/mL     Narrative:      Troponin T Reference Range:  <= 0.03 ng/mL-   Negative for AMI  >0.03 ng/mL-     Abnormal for myocardial necrosis.  Clinicians would have to utilize clinical acumen, EKG, Troponin and serial changes to determine if it is an Acute Myocardial Infarction or myocardial injury due to an underlying chronic condition.       Results may be falsely decreased if patient taking Biotin.      BNP [[id number]]  (Normal) Collected: 01/06/23 1051    Specimen: Blood Updated: 01/06/23 1238     proBNP 246.8 pg/mL     Narrative:      Among patients with dyspnea, NT-proBNP is highly sensitive for the detection of acute congestive heart failure. In addition NT-proBNP of <300 pg/ml effectively rules out acute congestive heart failure with 99% negative predictive value.      [place] Draw [[id number]] Collected: 01/06/23 1051    Specimen: Blood Updated: 01/06/23 1230    Narrative:      The following orders were created for panel order  [place] Draw.  Procedure                               Abnormality         Status                     ---------                               -----------         ------                     Green Top (Gel)[[id number]]                                  Final result               Lavender Top[[id number]]                                     Final result               Gold Top - SST[[id number]]                                   Final result               Light Blue Top[[id number]]                                   Final result                 Please view results for these tests on the individual orders.    Green Top (Gel) [[id number]] Collected: 01/06/23 1051    Specimen: Blood Updated: 01/06/23 1230     Extra Tube Hold for add-ons.     Comment: Auto resulted.       Lavender Top [[id number]] Collected: 01/06/23 1051    Specimen: Blood Updated: 01/06/23 1230     Extra Tube hold for add-on     Comment: Auto resulted       Gold Top - SST [[id number]] Collected: 01/06/23 1051    Specimen: Blood Updated: 01/06/23 1230     Extra Tube Hold for add-ons.     Comment: Auto resulted.       Light Blue Top [[id number]] Collected: 01/06/23 1051    Specimen: Blood Updated: 01/06/23 1230     Extra Tube Hold for add-ons.     Comment: Auto resulted       CBC & Differential [[id number]]  (Abnormal) Collected: 01/06/23 1051    Specimen: Blood Updated: 01/06/23 1221    Narrative:      The following orders were created for panel order CBC & Differential.  Procedure                               Abnormality         Status                     ---------                               -----------         ------                     CBC Auto Differential[[id number]]        Abnormal            Final result                 Please view results for these tests on the individual orders.    CBC Auto Differential [[id number]]  (Abnormal) Collected: 01/06/23 1051    Specimen: Blood Updated: 01/06/23 1221     WBC 12.39 10*3/mm3      RBC 4.50 10*6/mm3      Hemoglobin  12.9 g/dL      Hematocrit 39.4 %      MCV 87.6 fL      MCH 28.7 pg      MCHC 32.7 g/dL      RDW 13.2 %      RDW-SD 41.6 fl      MPV 9.1 fL      Platelets 421 10*3/mm3      Neutrophil % 71.2 %      Lymphocyte % 17.8 %      Monocyte % 7.4 %      Eosinophil % 2.9 %      Basophil % 0.4 %      Immature Grans % 0.3 %      Neutrophils, Absolute 8.82 10*3/mm3      Lymphocytes, Absolute 2.20 10*3/mm3      Monocytes, Absolute 0.92 10*3/mm3      Eosinophils, Absolute 0.36 10*3/mm3      Basophils, Absolute 0.05 10*3/mm3      Immature Grans, Absolute 0.04 10*3/mm3      nRBC 0.0 /100 WBC         Imaging Results (Last 24 Hours)     Procedure Component Value Units Date/Time    XR Chest 1 View [[id number]] Collected: 01/06/23 0000     Updated: 01/06/23 1332    Narrative:      EXAM: XR CHEST 1 VIEW     HISTORY: Chest Pain triage protocol  Chest Pain Triage Protocol.    COMPARISON: December 22, 2020    FINDINGS:    Heart: Enlarged    Mediastinum: Midline sternotomy    Pleura: No significant effusion    Lungs: No consolidation. Lung bases are partially obscured.    Bones: No acute abnormality.    Abdomen: Visualized upper abdomen is unremarkable      Impression:        No evidence of acute cardiopulmonary disease on this single view  chest x-ray.    Electronically signed by:  Gaurav Carreon DO  1/6/2023 1:30 PM CST  Workstation: [id number]        I have personally reviewed and interpreted the radiology studies and ECG obtained at time of admission.     Assessment / Plan     Assessment:   Active Hospital Problems    Diagnosis    • Chest pain, unspecified type      Plan:   Chest pain, history of CABG and PCI  -Somewhat atypical chest pain to midepigastric/right upper quadrant/right lower chest wall pain, resolved with 1 nitroglycerin  - Long history of coronary artery disease with history of CABG and PCI  - Sees Dr. Snowden outpatient  -Troponin normal in the ED, no acute EKG abnormalities  - We will consult cardiology    Peptic ulcer  disease  -Protonix    Gastroparesis  -Protonix    Type 2 diabetes  -Biding scale    Hypertension  Hyperlipidemia  Anxiety  Depression  GERD  Obesity  Many years    Current IV fluids: None  VTE prophylaxis: Heparin  GI prophylaxis: Protonix    Code Status/Advanced Care Plan: Documented, full code/CPR    The patient's surrogate decision maker is     I discussed my findings and recommendations with the patient, attending, nursing staff.    Estimated length of stay is 1 to 2 days.     The patient was seen and examined by me on 1/6/2023 at 1600.    Electronically signed by Marleny Montoya PA-C, 01/06/23, 16:19 CST.

## 2023-01-06 NOTE — ED PROVIDER NOTES
Subjective   History of Present Illness  63-year-old female with a history of bypass surgery comes to the ER with a chief complaint of a crushing chest pain since last night.  Starts in middle of her chest and radiates around the right side of her chest towards her back.  Patient states she has had her gallbladder removed.  Nothing makes her feel better or worse.  She endorses some nausea, but denies vomiting.  Denies other symptoms.    History provided by:  Patient   used: No        Review of Systems   Constitutional: Negative for chills and fever.   HENT: Negative for drooling.    Eyes: Negative for redness.   Respiratory: Negative for cough and shortness of breath.    Cardiovascular: Positive for chest pain.   Gastrointestinal: Positive for nausea. Negative for abdominal pain, constipation, diarrhea and vomiting.   Genitourinary: Negative for flank pain.   Skin: Negative for color change.   Neurological: Negative for seizures.   Psychiatric/Behavioral: Negative for confusion.       Past Medical History:   Diagnosis Date   • Acute bacterial endocarditis 3/13/2021   • Angina, class IV ([facility])    • Anxiety    • Anxiety and depression    • Arthritis    • Benign paroxysmal positional vertigo    • Bladder disorder     has bladder stimulator   • Carpal tunnel syndrome    • CHF (congestive heart failure) ([facility])    • Chronic pain    • Coronary atherosclerosis     hx CABG 2005.  is followed by Dr Kwon   • Depression    • Diabetes mellitus ([facility])     Type 2, controlled   • Diabetic polyneuropathy ([facility])    • Disease of thyroid gland    • Elevated cholesterol    • Female stress incontinence    • Foot pain, left    • Full dentures    • Gastroparesis    • GERD (gastroesophageal reflux disease)    • Hyperlipidemia    • Hypertension    • Low back pain    • Malaise and fatigue    • Multiple joint pain    • Obesity     Refuses to be weighed   • Occlusion and stenosis of bilateral carotid arteries 6/18/2021   •  Otalgia     Both   • Perforation of tympanic membrane     Left   • Postoperative wound infection    • Vitamin D deficiency    • Wears glasses     reading       Allergies   Allergen Reactions   • Seroquel [Quetiapine] Anaphylaxis   • Avandia [Rosiglitazone] Swelling   • Morphine And Related Hallucinations   • Oxycodone Hallucinations       Past Surgical History:   Procedure Laterality Date   • ABDOMINAL SURGERY     • AMPUTATION FOOT     • ANGIOPLASTY      coronary   • ANKLE ARTHROSCOPY Left 2/26/2021    Procedure: Left foot hardware removal, ankle arthroscopy, bone grafting , left foot exostectomy;  Surgeon: Ignacio Lord DPM;  Location: [facility] OR;  Service: Podiatry;  Laterality: Left;   • BREAST BIOPSY Right    • CARDIAC CATHETERIZATION     • CARDIAC CATHETERIZATION N/A 6/20/2017    Procedure: Right Heart Cath;  Surgeon: Can Kwon MD PhD;  Location: [facility] CATH INVASIVE LOCATION;  Service:    • CARDIAC CATHETERIZATION N/A 2/18/2020    Procedure: Left Heart Cath;  Surgeon: Catalina Cooper MD;  Location: [facility] CATH INVASIVE LOCATION;  Service: Cardiology;  Laterality: N/A;   • CARDIAC CATHETERIZATION N/A 4/6/2022    Procedure: Left Heart Cath;  Surgeon: Sheryl Navas MD;  Location: [facility] CATH INVASIVE LOCATION;  Service: Cardiology;  Laterality: N/A;   • CARPAL TUNNEL RELEASE     • CHOLECYSTECTOMY     • COLONOSCOPY N/A 6/24/2020    Procedure: COLONOSCOPY;  Surgeon: Julián Maldonado MD;  Location: [facility] ENDOSCOPY;  Service: Gastroenterology;  Laterality: N/A;   • CORONARY ARTERY BYPASS GRAFT  2005   • ENDOSCOPY N/A 10/19/2018    Procedure: ESOPHAGOGASTRODUODENOSCOPY possible dilation;  Surgeon: Julián Maldonado MD;  Location: [facility] ENDOSCOPY;  Service: Gastroenterology   • ENDOSCOPY N/A 6/24/2020    Procedure: ESOPHAGOGASTRODUODENOSCOPY WED appt please;  Surgeon: Julián Maldonado MD;  Location: [facility] ENDOSCOPY;  Service: Gastroenterology;  Laterality: N/A;   • ENDOSCOPY N/A 6/10/2022     Procedure: ESOPHAGOGASTRODUODENOSCOPY   room 380;  Surgeon: Jeremiah Wilkins MD;  Location: [facility] ENDOSCOPY;  Service: Gastroenterology;  Laterality: N/A;   • ENDOSCOPY AND COLONOSCOPY     • FOOT SURGERY      Toes   • FOOT SURGERY     • GASTRIC BANDING      Revision, laparoscopic   • HYSTERECTOMY     • INCISION AND DRAINAGE LEG Left 3/12/2021    Procedure: Left ankle arthroscopic irrigation and debridement, screw removal;  Surgeon: Ignacio Lord DPM;  Location: [facility] OR;  Service: Podiatry;  Laterality: Left;   • MOUTH SURGERY     • SALPINGO OOPHORECTOMY     • SHOULDER SURGERY     • SUBTALAR ARTHRODESIS Left 1/16/2019    Procedure: LEFT FOOT HARDWARE REMOVAL, FIFTH METATARSAL , OPEN REDUCTION INTERNAL FIXATION, CALCANEAL OSTEOTOMY;  Surgeon: Ignacio Lord DPM;  Location: [facility] OR;  Service: Podiatry   • SUBTALAR ARTHRODESIS Left 10/16/2019    Procedure: foot hardware removal, subtalar joint fusion  possible de/reattachment of achilles tendon        (c-arm);  Surgeon: Ignacio Lord DPM;  Location: [facility] OR;  Service: Podiatry   • SUBTALAR ARTHRODESIS Left 9/30/2020    Procedure: subtalar, talonavicular joint arthrodesis.  Removal hardware.          (c-arm);  Surgeon: Ignacio Lord DPM;  Location: [facility] OR;  Service: Podiatry;  Laterality: Left;   • TRANSESOPHAGEAL ECHOCARDIOGRAM (LAMONTE)      With color flow       Family History   Problem Relation Age of Onset   • Diabetes Other    • Heart disease Other    • Hypertension Other    • Heart disease Mother    • Stroke Mother    • Hypertension Mother    • Diabetes Sister    • Heart disease Sister    • Hypertension Sister    • Heart disease Sister    • Diabetes Sister    • Hypertension Sister    • Diabetes Sister    • Diabetes Sister    • Diabetes Sister    • Diabetes Sister        Social History     Socioeconomic History   • Marital status:    Tobacco Use   • Smoking status: Never   • Smokeless tobacco: Never   Vaping Use   • Vaping Use:  Never used   Substance and Sexual Activity   • Alcohol use: No   • Drug use: No   • Sexual activity: Defer           Objective    Vitals:    01/06/23 1314 01/06/23 1400 01/06/23 1417 01/06/23 1445   BP: 131/61 126/58 136/63 127/58   BP Location:       Patient Position:       Pulse: 60 58 56 56   Resp: 18  18    Temp:       TempSrc:       SpO2: 98% 98% 97% 92%   Weight:       Height:           Physical Exam  Vitals and nursing note reviewed.   Constitutional:       General: She is not in acute distress.     Appearance: She is well-developed. She is obese. She is ill-appearing. She is not toxic-appearing or diaphoretic.   Eyes:      Conjunctiva/sclera: Conjunctivae normal.   Pulmonary:      Effort: Pulmonary effort is normal. No accessory muscle usage or respiratory distress.      Breath sounds: No wheezing.   Chest:      Chest wall: No tenderness.   Abdominal:      General: Bowel sounds are normal.      Palpations: Abdomen is soft.      Tenderness: There is no abdominal tenderness (deep palpation). There is no guarding or rebound.   Skin:     General: Skin is warm and dry.      Capillary Refill: Capillary refill takes less than 2 seconds.   Neurological:      Mental Status: She is alert and oriented to person, place, and time.         ECG 12 Lead ED Triage Standing Order; Chest Pain      Date/Time: 1/6/2023 2:54 PM  Performed by: Addi Johnson MD  Authorized by: Addi Johnson MD   Interpreted by physician  Rhythm: sinus rhythm  Rate: normal  BPM: 65  QRS axis: normal  ST segment elevation noted on lead: none.                   ED Course      Results for orders placed or performed during the hospital encounter of 01/06/23   Troponin    Specimen: Blood   Result Value Ref Range    Troponin T <0.010 0.000 - 0.030 ng/mL   Troponin    Specimen: Blood   Result Value Ref Range    Troponin T <0.010 0.000 - 0.030 ng/mL   Comprehensive Metabolic Panel    Specimen: Blood   Result Value Ref Range    Glucose 204 (H) 65 -  99 mg/dL    BUN 13 8 - 23 mg/dL    Creatinine 1.02 (H) 0.57 - 1.00 mg/dL    Sodium 139 136 - 145 mmol/L    Potassium 3.6 3.5 - 5.2 mmol/L    Chloride 100 98 - 107 mmol/L    CO2 30.0 (H) 22.0 - 29.0 mmol/L    Calcium 9.6 8.6 - 10.5 mg/dL    Total Protein 7.1 6.0 - 8.5 g/dL    Albumin 4.1 3.5 - 5.2 g/dL    ALT (SGPT) 18 1 - 33 U/L    AST (SGOT) 14 1 - 32 U/L    Alkaline Phosphatase 120 (H) 39 - 117 U/L    Total Bilirubin 0.3 0.0 - 1.2 mg/dL    Globulin 3.0 gm/dL    A/G Ratio 1.4 g/dL    BUN/Creatinine Ratio 12.7 7.0 - 25.0    Anion Gap 9.0 5.0 - 15.0 mmol/L    eGFR 62.7 >60.0 mL/min/1.73   BNP    Specimen: Blood   Result Value Ref Range    proBNP 246.8 0.0 - 900.0 pg/mL   CBC Auto Differential    Specimen: Blood   Result Value Ref Range    WBC 12.39 (H) 3.40 - 10.80 10*3/mm3    RBC 4.50 3.77 - 5.28 10*6/mm3    Hemoglobin 12.9 12.0 - 15.9 g/dL    Hematocrit 39.4 34.0 - 46.6 %    MCV 87.6 79.0 - 97.0 fL    MCH 28.7 26.6 - 33.0 pg    MCHC 32.7 31.5 - 35.7 g/dL    RDW 13.2 12.3 - 15.4 %    RDW-SD 41.6 37.0 - 54.0 fl    MPV 9.1 6.0 - 12.0 fL    Platelets 421 140 - 450 10*3/mm3    Neutrophil % 71.2 42.7 - 76.0 %    Lymphocyte % 17.8 (L) 19.6 - 45.3 %    Monocyte % 7.4 5.0 - 12.0 %    Eosinophil % 2.9 0.3 - 6.2 %    Basophil % 0.4 0.0 - 1.5 %    Immature Grans % 0.3 0.0 - 0.5 %    Neutrophils, Absolute 8.82 (H) 1.70 - 7.00 10*3/mm3    Lymphocytes, Absolute 2.20 0.70 - 3.10 10*3/mm3    Monocytes, Absolute 0.92 (H) 0.10 - 0.90 10*3/mm3    Eosinophils, Absolute 0.36 0.00 - 0.40 10*3/mm3    Basophils, Absolute 0.05 0.00 - 0.20 10*3/mm3    Immature Grans, Absolute 0.04 0.00 - 0.05 10*3/mm3    nRBC 0.0 0.0 - 0.2 /100 WBC   ECG 12 Lead ED Triage Standing Order; Chest Pain   Result Value Ref Range    QT Interval 412 ms    QTC Interval 428 ms   Green Top (Gel)   Result Value Ref Range    Extra Tube Hold for add-ons.    Lavender Top   Result Value Ref Range    Extra Tube hold for add-on    Gold Top - SST   Result Value Ref Range     Extra Tube Hold for add-ons.    Light Blue Top   Result Value Ref Range    Extra Tube Hold for add-ons.      *Note: Due to a large number of results and/or encounters for the requested time period, some results have not been displayed. A complete set of results can be found in Results Review.     XR Chest 1 View   Final Result      No evidence of acute cardiopulmonary disease on this single view   chest x-ray.      Electronically signed by:  Gaurav Carreon DO  1/6/2023 1:30 PM CST   Workstation: [id number]             HEART Score for Major Cardiac Events - MDCalc  5 points -> Moderate Score (4-6 points) Risk of MACE of 12-16.6%.      Medical Decision Making  Vital signs are stable, afebrile.  Labs unremarkable.  Troponin negative x1.  Chest x-ray shows no acute cardiopulmonary process.  EKG is negative for acute ischemic changes.  She has an elevated heart score.  Spoke with the on-call hospitalist agrees to admit.    Chest pain, unspecified type: acute illness or injury  Amount and/or Complexity of Data Reviewed  Labs: ordered.  Radiology: ordered.  ECG/medicine tests: ordered and independent interpretation performed.      Risk  OTC drugs.  Prescription drug management.  Decision regarding hospitalization.          Final diagnoses:   Chest pain, unspecified type       ED Disposition  ED Disposition     ED Disposition   Decision to Admit    Condition   --    Comment   Level of Care: Telemetry [5]   Diagnosis: Chest pain, unspecified type [[id number]]   Admitting Physician: JEAN KOEHLER [[id number]]   Attending Physician: JEAN KOEHLER [[id number]]               No follow-up provider specified.       Medication List      No changes were made to your prescriptions during this visit.          Addi Johnson MD  01/06/23 [id number]

## 2023-01-07 ENCOUNTER — READMISSION MANAGEMENT (OUTPATIENT)
Dept: CALL CENTER | Facility: HOSPITAL | Age: 63
End: 2023-01-07
Payer: COMMERCIAL

## 2023-01-07 LAB
ANION GAP SERPL CALCULATED.3IONS-SCNC: 10 MMOL/L (ref 5–15)
BASOPHILS # BLD AUTO: 0.04 10*3/MM3 (ref 0–0.2)
BASOPHILS NFR BLD AUTO: 0.4 % (ref 0–1.5)
BUN SERPL-MCNC: 12 MG/DL (ref 8–23)
BUN/CREAT SERPL: 12.4 (ref 7–25)
CALCIUM SPEC-SCNC: 9.2 MG/DL (ref 8.6–10.5)
CHLORIDE SERPL-SCNC: 101 MMOL/L (ref 98–107)
CO2 SERPL-SCNC: 28 MMOL/L (ref 22–29)
CREAT SERPL-MCNC: 0.97 MG/DL (ref 0.57–1)
DEPRECATED RDW RBC AUTO: 42.8 FL (ref 37–54)
EGFRCR SERPLBLD CKD-EPI 2021: 66.6 ML/MIN/1.73
EOSINOPHIL # BLD AUTO: 0.34 10*3/MM3 (ref 0–0.4)
EOSINOPHIL NFR BLD AUTO: 3.7 % (ref 0.3–6.2)
ERYTHROCYTE [DISTWIDTH] IN BLOOD BY AUTOMATED COUNT: 13.3 % (ref 12.3–15.4)
GLUCOSE BLDC GLUCOMTR-MCNC: 124 MG/DL (ref 70–130)
GLUCOSE BLDC GLUCOMTR-MCNC: 184 MG/DL (ref 70–130)
GLUCOSE BLDC GLUCOMTR-MCNC: 214 MG/DL (ref 70–130)
GLUCOSE BLDC GLUCOMTR-MCNC: 219 MG/DL (ref 70–130)
GLUCOSE SERPL-MCNC: 212 MG/DL (ref 65–99)
HCT VFR BLD AUTO: 37.5 % (ref 34–46.6)
HGB BLD-MCNC: 12.2 G/DL (ref 12–15.9)
IMM GRANULOCYTES # BLD AUTO: 0.03 10*3/MM3 (ref 0–0.05)
IMM GRANULOCYTES NFR BLD AUTO: 0.3 % (ref 0–0.5)
LYMPHOCYTES # BLD AUTO: 2.1 10*3/MM3 (ref 0.7–3.1)
LYMPHOCYTES NFR BLD AUTO: 22.6 % (ref 19.6–45.3)
MAGNESIUM SERPL-MCNC: 1.8 MG/DL (ref 1.6–2.4)
MCH RBC QN AUTO: 28.6 PG (ref 26.6–33)
MCHC RBC AUTO-ENTMCNC: 32.5 G/DL (ref 31.5–35.7)
MCV RBC AUTO: 88 FL (ref 79–97)
MONOCYTES # BLD AUTO: 0.58 10*3/MM3 (ref 0.1–0.9)
MONOCYTES NFR BLD AUTO: 6.3 % (ref 5–12)
NEUTROPHILS NFR BLD AUTO: 6.19 10*3/MM3 (ref 1.7–7)
NEUTROPHILS NFR BLD AUTO: 66.7 % (ref 42.7–76)
NRBC BLD AUTO-RTO: 0 /100 WBC (ref 0–0.2)
PLATELET # BLD AUTO: 373 10*3/MM3 (ref 140–450)
PMV BLD AUTO: 9.1 FL (ref 6–12)
POTASSIUM SERPL-SCNC: 3.6 MMOL/L (ref 3.5–5.2)
RBC # BLD AUTO: 4.26 10*6/MM3 (ref 3.77–5.28)
SODIUM SERPL-SCNC: 139 MMOL/L (ref 136–145)
WBC NRBC COR # BLD: 9.28 10*3/MM3 (ref 3.4–10.8)

## 2023-01-07 PROCEDURE — 25010000002 HEPARIN (PORCINE) PER 1000 UNITS: Performed by: PHYSICIAN ASSISTANT

## 2023-01-07 PROCEDURE — 85025 COMPLETE CBC W/AUTO DIFF WBC: CPT | Performed by: PHYSICIAN ASSISTANT

## 2023-01-07 PROCEDURE — 25010000002 HYDROMORPHONE 1 MG/ML SOLUTION: Performed by: INTERNAL MEDICINE

## 2023-01-07 PROCEDURE — 63710000001 ONDANSETRON PER 8 MG: Performed by: PHYSICIAN ASSISTANT

## 2023-01-07 PROCEDURE — 83735 ASSAY OF MAGNESIUM: CPT | Performed by: PHYSICIAN ASSISTANT

## 2023-01-07 PROCEDURE — 82962 GLUCOSE BLOOD TEST: CPT

## 2023-01-07 PROCEDURE — 25010000002 HYDROMORPHONE 1 MG/ML SOLUTION: Performed by: PHYSICIAN ASSISTANT

## 2023-01-07 PROCEDURE — 99214 OFFICE O/P EST MOD 30 MIN: CPT | Performed by: INTERNAL MEDICINE

## 2023-01-07 PROCEDURE — 96375 TX/PRO/DX INJ NEW DRUG ADDON: CPT

## 2023-01-07 PROCEDURE — 80048 BASIC METABOLIC PNL TOTAL CA: CPT | Performed by: PHYSICIAN ASSISTANT

## 2023-01-07 PROCEDURE — G0378 HOSPITAL OBSERVATION PER HR: HCPCS

## 2023-01-07 PROCEDURE — 63710000001 INSULIN ASPART PER 5 UNITS: Performed by: PHYSICIAN ASSISTANT

## 2023-01-07 PROCEDURE — 96372 THER/PROPH/DIAG INJ SC/IM: CPT

## 2023-01-07 PROCEDURE — 63710000001 INSULIN DETEMIR PER 5 UNITS: Performed by: PHYSICIAN ASSISTANT

## 2023-01-07 RX ORDER — ALUMINA, MAGNESIA, AND SIMETHICONE 2400; 2400; 240 MG/30ML; MG/30ML; MG/30ML
15 SUSPENSION ORAL ONCE
Status: COMPLETED | OUTPATIENT
Start: 2023-01-07 | End: 2023-01-07

## 2023-01-07 RX ORDER — LIDOCAINE HYDROCHLORIDE 20 MG/ML
15 SOLUTION OROPHARYNGEAL ONCE
Status: COMPLETED | OUTPATIENT
Start: 2023-01-07 | End: 2023-01-07

## 2023-01-07 RX ORDER — PANTOPRAZOLE SODIUM 40 MG/10ML
40 INJECTION, POWDER, LYOPHILIZED, FOR SOLUTION INTRAVENOUS ONCE
Status: COMPLETED | OUTPATIENT
Start: 2023-01-07 | End: 2023-01-07

## 2023-01-07 RX ADMIN — ONDANSETRON HYDROCHLORIDE 8 MG: 4 TABLET, FILM COATED ORAL at 08:49

## 2023-01-07 RX ADMIN — HEPARIN SODIUM 5000 UNITS: 5000 INJECTION INTRAVENOUS; SUBCUTANEOUS at 17:05

## 2023-01-07 RX ADMIN — RANOLAZINE 500 MG: 500 TABLET, FILM COATED, EXTENDED RELEASE ORAL at 21:16

## 2023-01-07 RX ADMIN — ASPIRIN 325 MG: 325 TABLET, COATED ORAL at 08:50

## 2023-01-07 RX ADMIN — RANOLAZINE 500 MG: 500 TABLET, FILM COATED, EXTENDED RELEASE ORAL at 08:50

## 2023-01-07 RX ADMIN — HYDROMORPHONE HYDROCHLORIDE 1 MG: 1 INJECTION, SOLUTION INTRAMUSCULAR; INTRAVENOUS; SUBCUTANEOUS at 16:46

## 2023-01-07 RX ADMIN — LEVOTHYROXINE SODIUM 25 MCG: 25 TABLET ORAL at 05:59

## 2023-01-07 RX ADMIN — VENLAFAXINE HYDROCHLORIDE 75 MG: 75 CAPSULE, EXTENDED RELEASE ORAL at 08:50

## 2023-01-07 RX ADMIN — MELOXICAM 15 MG: 15 TABLET ORAL at 08:50

## 2023-01-07 RX ADMIN — Medication 10 ML: at 08:50

## 2023-01-07 RX ADMIN — PANTOPRAZOLE SODIUM 40 MG: 40 INJECTION, POWDER, FOR SOLUTION INTRAVENOUS at 14:43

## 2023-01-07 RX ADMIN — HYDROMORPHONE HYDROCHLORIDE 0.25 MG: 1 INJECTION, SOLUTION INTRAMUSCULAR; INTRAVENOUS; SUBCUTANEOUS at 12:43

## 2023-01-07 RX ADMIN — CLOPIDOGREL BISULFATE 75 MG: 75 TABLET ORAL at 08:50

## 2023-01-07 RX ADMIN — PANTOPRAZOLE SODIUM 40 MG: 40 TABLET, DELAYED RELEASE ORAL at 05:59

## 2023-01-07 RX ADMIN — INSULIN DETEMIR 35 UNITS: 100 INJECTION, SOLUTION SUBCUTANEOUS at 21:16

## 2023-01-07 RX ADMIN — METOPROLOL SUCCINATE 50 MG: 50 TABLET, EXTENDED RELEASE ORAL at 08:49

## 2023-01-07 RX ADMIN — INSULIN ASPART 5 UNITS: 100 INJECTION, SOLUTION INTRAVENOUS; SUBCUTANEOUS at 12:00

## 2023-01-07 RX ADMIN — HEPARIN SODIUM 5000 UNITS: 5000 INJECTION INTRAVENOUS; SUBCUTANEOUS at 05:59

## 2023-01-07 RX ADMIN — Medication 10 ML: at 21:16

## 2023-01-07 RX ADMIN — INSULIN ASPART 3 UNITS: 100 INJECTION, SOLUTION INTRAVENOUS; SUBCUTANEOUS at 17:05

## 2023-01-07 RX ADMIN — FUROSEMIDE 40 MG: 40 TABLET ORAL at 08:50

## 2023-01-07 RX ADMIN — HYDROMORPHONE HYDROCHLORIDE 0.25 MG: 1 INJECTION, SOLUTION INTRAMUSCULAR; INTRAVENOUS; SUBCUTANEOUS at 06:00

## 2023-01-07 RX ADMIN — FOLIC ACID 1000 MCG: 1 TABLET ORAL at 08:50

## 2023-01-07 RX ADMIN — INSULIN DETEMIR 40 UNITS: 100 INJECTION, SOLUTION SUBCUTANEOUS at 08:53

## 2023-01-07 RX ADMIN — ACETAMINOPHEN 650 MG: 325 TABLET ORAL at 21:16

## 2023-01-07 RX ADMIN — ISOSORBIDE MONONITRATE 30 MG: 30 TABLET, EXTENDED RELEASE ORAL at 08:50

## 2023-01-07 RX ADMIN — HYDROCHLOROTHIAZIDE 12.5 MG: 12.5 TABLET ORAL at 09:25

## 2023-01-07 RX ADMIN — ARIPIPRAZOLE 5 MG: 5 TABLET ORAL at 08:50

## 2023-01-07 RX ADMIN — LIDOCAINE HYDROCHLORIDE 15 ML: 20 SOLUTION ORAL; TOPICAL at 13:09

## 2023-01-07 RX ADMIN — ATORVASTATIN CALCIUM 80 MG: 40 TABLET, FILM COATED ORAL at 08:49

## 2023-01-07 RX ADMIN — AMLODIPINE BESYLATE 5 MG: 5 TABLET ORAL at 08:49

## 2023-01-07 RX ADMIN — ALUMINUM HYDROXIDE, MAGNESIUM HYDROXIDE, AND DIMETHICONE 15 ML: 400; 400; 40 SUSPENSION ORAL at 13:09

## 2023-01-07 RX ADMIN — HYDROMORPHONE HYDROCHLORIDE 0.25 MG: 1 INJECTION, SOLUTION INTRAMUSCULAR; INTRAVENOUS; SUBCUTANEOUS at 23:41

## 2023-01-07 RX ADMIN — INSULIN ASPART 5 UNITS: 100 INJECTION, SOLUTION INTRAVENOUS; SUBCUTANEOUS at 08:50

## 2023-01-07 NOTE — CONSULTS
Cardiology at [facility]  Cardiovascular Consultation Note      Ariana Martinez  [unfilled]  [id number]  1960    DATE OF ADMISSION: 2023  DATE OF CONSULTATION:  2023    Dolly Foss APRN  Treatment Team:   Attending Provider: Raymon Lazcano MD  Physician Assistant: Marleny Montoya PA-C  Consulting Physician: Raymon Lazcano MD  Consulting Physician: Justus Jim MD  Admitting Provider: Raymon Lazcano MD    Chief Complaint   Patient presents with   • Chest Pain       Chief complaint/ Reason for Consultation atypical discomfort predominantly in the right upper quadrant and epigastric area with a strong element of reproducibility and tender upon palpation.  EKG no acute ST-T wave changes.  Card exams are negative      History of Present Illness  63 years old patient who presented to our facility for evaluations of discomfort predominantly right upper quadrant and epigastric area with a strong element of tenderness upon palpation similar reproducibility and distribution to the back.  She has a history of gastroparesis.  No typical chest pain reported was reported.  EKG no acute ST-T wave changes.  Card exams are negative.  Monitor no significant bradyarrhythmia or tachycardia was noted.  She had concurrent medical problem history of CAD s/p CABG with last left heart cath showing the followin.  One-vessel obstructive coronary artery disease involving chronic total occlusion of the LAD.  LIMA to LAD is patent, target vessel is small in caliber with severe diffuse disease but not amenable to PCI  2.  Patent stents in the LAD extending into the diagonal  3.  Mild disease in the RCA and left circumflex which is unchanged from before  4.  Normal left-sided filling pressures    Echocardiogram preserved left ventricle systolic function no regional wall motion abnormality  Past Medical History:   Diagnosis Date   • Acute bacterial endocarditis 3/13/2021   • Angina, class IV ([facility])    •  Anxiety    • Anxiety and depression    • Arthritis    • Benign paroxysmal positional vertigo    • Bladder disorder     has bladder stimulator   • Carpal tunnel syndrome    • CHF (congestive heart failure) ([facility])    • Chronic pain    • Coronary atherosclerosis     hx CABG 2005.  is followed by Dr Kwon   • Depression    • Diabetes mellitus ([facility])     Type 2, controlled   • Diabetic polyneuropathy ([facility])    • Disease of thyroid gland    • Elevated cholesterol    • Female stress incontinence    • Foot pain, left    • Full dentures    • Gastroparesis    • GERD (gastroesophageal reflux disease)    • Hyperlipidemia    • Hypertension    • Low back pain    • Malaise and fatigue    • Multiple joint pain    • Obesity     Refuses to be weighed   • Occlusion and stenosis of bilateral carotid arteries 6/18/2021   • Otalgia     Both   • Perforation of tympanic membrane     Left   • Postoperative wound infection    • Vitamin D deficiency    • Wears glasses     reading       Past Surgical History:   Procedure Laterality Date   • ABDOMINAL SURGERY     • AMPUTATION FOOT     • ANGIOPLASTY      coronary   • ANKLE ARTHROSCOPY Left 2/26/2021    Procedure: Left foot hardware removal, ankle arthroscopy, bone grafting , left foot exostectomy;  Surgeon: Ignacio Lord DPM;  Location: [facility];  Service: Podiatry;  Laterality: Left;   • BREAST BIOPSY Right    • CARDIAC CATHETERIZATION     • CARDIAC CATHETERIZATION N/A 6/20/2017    Procedure: Right Heart Cath;  Surgeon: Can Kwon MD PhD;  Location: [facility] CATH INVASIVE LOCATION;  Service:    • CARDIAC CATHETERIZATION N/A 2/18/2020    Procedure: Left Heart Cath;  Surgeon: Catalina Cooper MD;  Location: [facility] CATH INVASIVE LOCATION;  Service: Cardiology;  Laterality: N/A;   • CARDIAC CATHETERIZATION N/A 4/6/2022    Procedure: Left Heart Cath;  Surgeon: Sheryl Navas MD;  Location: [facility] CATH INVASIVE LOCATION;  Service: Cardiology;  Laterality: N/A;   • CARPAL TUNNEL  RELEASE     • CHOLECYSTECTOMY     • COLONOSCOPY N/A 6/24/2020    Procedure: COLONOSCOPY;  Surgeon: Julián Maldonado MD;  Location: [facility] ENDOSCOPY;  Service: Gastroenterology;  Laterality: N/A;   • CORONARY ARTERY BYPASS GRAFT  2005   • ENDOSCOPY N/A 10/19/2018    Procedure: ESOPHAGOGASTRODUODENOSCOPY possible dilation;  Surgeon: Julián Maldonado MD;  Location: [facility] ENDOSCOPY;  Service: Gastroenterology   • ENDOSCOPY N/A 6/24/2020    Procedure: ESOPHAGOGASTRODUODENOSCOPY WED appt please;  Surgeon: Julián Maldonado MD;  Location: [facility] ENDOSCOPY;  Service: Gastroenterology;  Laterality: N/A;   • ENDOSCOPY N/A 6/10/2022    Procedure: ESOPHAGOGASTRODUODENOSCOPY   room 380;  Surgeon: Jeremiah Wilkins MD;  Location: [facility] ENDOSCOPY;  Service: Gastroenterology;  Laterality: N/A;   • ENDOSCOPY AND COLONOSCOPY     • FOOT SURGERY      Toes   • FOOT SURGERY     • GASTRIC BANDING      Revision, laparoscopic   • HYSTERECTOMY     • INCISION AND DRAINAGE LEG Left 3/12/2021    Procedure: Left ankle arthroscopic irrigation and debridement, screw removal;  Surgeon: Ignacio Lord DPM;  Location: [facility] OR;  Service: Podiatry;  Laterality: Left;   • MOUTH SURGERY     • SALPINGO OOPHORECTOMY     • SHOULDER SURGERY     • SUBTALAR ARTHRODESIS Left 1/16/2019    Procedure: LEFT FOOT HARDWARE REMOVAL, FIFTH METATARSAL , OPEN REDUCTION INTERNAL FIXATION, CALCANEAL OSTEOTOMY;  Surgeon: Ignacio Lord DPM;  Location: [facility] OR;  Service: Podiatry   • SUBTALAR ARTHRODESIS Left 10/16/2019    Procedure: foot hardware removal, subtalar joint fusion  possible de/reattachment of achilles tendon        (c-arm);  Surgeon: Ignacio Lord DPM;  Location: [facility] OR;  Service: Podiatry   • SUBTALAR ARTHRODESIS Left 9/30/2020    Procedure: subtalar, talonavicular joint arthrodesis.  Removal hardware.          (c-arm);  Surgeon: Ignacio Lord DPM;  Location: [facility] OR;  Service: Podiatry;  Laterality: Left;   • TRANSESOPHAGEAL  ECHOCARDIOGRAM (LAMONTE)      With color flow       Allergies   Allergen Reactions   • Seroquel [Quetiapine] Anaphylaxis   • Avandia [Rosiglitazone] Swelling   • Morphine And Related Hallucinations   • Oxycodone Hallucinations       No current facility-administered medications on file prior to encounter.     Current Outpatient Medications on File Prior to Encounter   Medication Sig Dispense Refill   • amLODIPine (NORVASC) 5 MG tablet Take 1 tablet by mouth Daily. 90 tablet 1   • ARIPiprazole (ABILIFY) 5 MG tablet TAKE 1 TABLET BY MOUTH EVERY DAY 90 tablet 0   • aspirin  MG tablet Take 1 tablet by mouth Daily. 30 tablet 0   • atorvastatin (LIPITOR) 80 MG tablet Take 1 tablet by mouth Daily. 90 tablet 1   • B-D ULTRAFINE III SHORT PEN 31G X 8 MM misc USE TO INJECT 5 TIMES A  each 11   • B-D ULTRAFINE III SHORT PEN 31G X 8 MM misc USE UP TO 4 (FOUR) TIMES DAILY WITH INSULIN AS DIRECTED. 200 each 1   • BD SHARPS CONTAINER HOME misc 1 each Take As Directed. 1 each 0   • Blood Glucose Monitoring Suppl (ONE TOUCH ULTRA MINI) w/Device kit Patient will need the Accu-Check Avitio meter 1 each 0   • butalbital-acetaminophen-caffeine (FIORICET, ESGIC) -40 MG per tablet Take one to two tablets by mouth per headache episode as needed. 6 tablet 0   • Calcium Citrate-Vitamin D 250-200 MG-UNIT tablet Take 2 tablets by mouth 2 (two) times a day.     • clopidogrel (PLAVIX) 75 MG tablet Take 1 tablet by mouth Daily. 90 tablet 0   • [employer] Diclofenac Sodium 1 % gel gel APPLY 4 G TOPICALLY TO THE APPROPRIATE AREA AS DIRECTED 2 (TWO) TIMES A DAY. SMALL AMOUNT TO AFFECTED AREA 100 g 0   • folic acid (FOLVITE) 1 MG tablet TAKE 1 TABLET BY MOUTH EVERY DAY 90 tablet 1   • furosemide (LASIX) 40 MG tablet TAKE 1 TABLET BY MOUTH EVERY DAY 30 tablet 0   • gabapentin (NEURONTIN) 100 MG capsule Take 1 capsule by mouth Every 12 (Twelve) Hours. 60 capsule 0   • glucose blood (Accu-Chek Guide) test strip 1 each by Other route 4 (Four)  "Times a Day. To test blood sugar 4X daily. For  Dx E11.65 600 each 1   • glucose monitor monitoring kit 1 each Daily. accucheck eve meter, E11.9 1 each 0   • hydroCHLOROthiazide (HYDRODIURIL) 12.5 MG tablet TAKE 1 TABLET BY MOUTH EVERY DAY 30 tablet 1   • HYDROcodone-acetaminophen (NORCO) 7.5-325 MG per tablet Take 1 tablet by mouth Every 6 (Six) Hours As Needed for Moderate Pain. 120 tablet 0   • insulin aspart (NovoLOG FlexPen) 100 UNIT/ML solution pen-injector sc pen Inject up to 45 units  3 TIMES A DAY BEFORE MEALS 90 mL 11   • Insulin Glargine (BASAGLAR KWIKPEN) 100 UNIT/ML injection pen Inject 40 units into the appropriate area every morning and 35 units into the appropriate area every night 30 mL 4   • isosorbide mononitrate (IMDUR) 30 MG 24 hr tablet TAKE 1 TABLET BY MOUTH EVERY DAY 30 tablet 0   • Lancets (accu-chek soft touch) lancets As directed 100 each 12   • levothyroxine (SYNTHROID, LEVOTHROID) 25 MCG tablet Take 1 tablet by mouth Daily. 30 tablet 11   • meloxicam (MOBIC) 15 MG tablet TAKE 1 TABLET BY MOUTH EVERY DAY WITH FOOD 30 tablet 3   • methocarbamol (ROBAXIN) 500 MG tablet TAKE 1 TABLET BY MOUTH 2 TIMES A DAY AS NEEDED FOR MUSCLE SPASMS. 60 tablet 5   • metoclopramide (REGLAN) 10 MG tablet TAKE 1 TABLET BY MOUTH 4 (FOUR) TIMES A DAY AS NEEDED (NAUSEA). 120 tablet 0   • metoclopramide (REGLAN) 5 MG tablet Take 1 tablet by mouth 3 (Three) Times a Day As Needed (vomiting). 12 tablet 0   • metoprolol succinate XL (TOPROL-XL) 50 MG 24 hr tablet Take 1 tablet by mouth Daily. Dose increased 5/24/21 90 tablet 1   • Needle, Disp, (Easy Touch FlipLock Needles) 25G X 1-1/2\" misc 1 each Every 28 (Twenty-Eight) Days. For administering B12 cyanocobalomin. Dx vitamin B12 deficiency. 10 each 3   • Needle, Disp, (Hypodermic Needle) 20G X 1\" misc 1 each Every 28 (Twenty-Eight) Days. For drawing up B12 for injection monthly, change back to smaller gauge needle prior to injection. Dx Vitamin B12  Deficiency. 10 " "each 2   • ondansetron (ZOFRAN) 8 MG tablet Take 1 tablet by mouth Every 8 (Eight) Hours As Needed for Nausea or Vomiting. 18 tablet 1   • ondansetron ODT (ZOFRAN-ODT) 4 MG disintegrating tablet Place 1 tablet on the tongue 4 (Four) Times a Day As Needed for Nausea or Vomiting. 12 tablet 0   • pantoprazole (PROTONIX) 20 MG EC tablet Take 2 tablets by mouth Daily. 90 tablet 3   • ranolazine (RANEXA) 500 MG 12 hr tablet Take 1 tablet by mouth Every 12 (Twelve) Hours. 180 tablet 3   • Syringe 20G X 1-1/2\" 3 ML misc 1 each Every 28 (Twenty-Eight) Days. For injection cyanocobalomin, Dx vit B12 deficiency. 10 each 2   • venlafaxine XR (EFFEXOR-XR) 75 MG 24 hr capsule TAKE 1 CAPSULE BY MOUTH DAILY WITH BREAKFAST. 90 capsule 0   • vitamin D (ERGOCALCIFEROL) 1.25 MG (15162 UT) capsule capsule TAKE 1 CAPSULE BY MOUTH EVERY 7 DAYS. 36 capsule 0   • cyanocobalamin 1000 MCG/ML injection Inject 1 mL into the appropriate muscle as directed by prescriber Every 28 (Twenty-Eight) Days. 1 mL 2   • meclizine (ANTIVERT) 25 MG tablet Take 1 tablet by mouth 3 (Three) Times a Day As Needed for dizziness. 90 tablet 6   • naloxone (NARCAN) 0.4 MG/ML injection Infuse 0.5 mL into a venous catheter Every 5 (Five) Minutes As Needed for Opioid Reversal.     • nitroglycerin (NITROSTAT) 0.4 MG SL tablet Place 1 tablet under the tongue Every 5 (Five) Minutes As Needed for Chest Pain. Take no more than 3 doses in 15 minutes. 20 tablet 11       Social History     Socioeconomic History   • Marital status:    Tobacco Use   • Smoking status: Never   • Smokeless tobacco: Never   Vaping Use   • Vaping Use: Never used   Substance and Sexual Activity   • Alcohol use: No   • Drug use: No   • Sexual activity: Defer           Review of Systems:     Constitutional: Negative for fever cough or chills positive for activity change  HENT: Negative.    Eyes: Negative.    Respiratory: Negative.    Cardiovascular: No typical chest pain " "reported.  Gastrointestinal: Positive for pain in the right upper quadrant epigastric area tender upon palpations  Endocrine: Negative for polydipsia polyuria.    Genitourinary: Negative.    Musculoskeletal: Negative.    Skin: Negative.    Neurological: Negative.    Psychiatric/Behavioral: Negative.             Objective:     Vitals:    01/06/23 2306 01/07/23 0019 01/07/23 0317 01/07/23 0740   BP: 116/58  132/63 108/54   BP Location: Left arm  Left arm Left arm   Patient Position: Lying  Lying Lying   Pulse: 66 60 68 62   Resp: 16  16 18   Temp: 97 °F (36.1 °C)  97 °F (36.1 °C) 97.5 °F (36.4 °C)   TempSrc: Temporal  Temporal Temporal   SpO2: 91%  92% 96%   Weight:       Height:         Body mass index is 39.53 kg/m².  Flowsheet Rows    Flowsheet Row First Filed Value   Admission Height 172.7 cm (68\") Documented at 01/06/2023 1159   Admission Weight 118 kg (260 lb) Documented at 01/06/2023 1159          Intake/Output Summary (Last 24 hours) at 1/7/2023 1111  Last data filed at 1/7/2023 0600  Gross per 24 hour   Intake --   Output 700 ml   Net -700 ml         Physical Exam   Constitutional: Cooperative, alert and oriented, well developed, well nourished, in no acute distress.     HENT:   Head: Normocephalic, conjunctivae is a pink, thyroid is nonpalpable no carotid bruit and trachea central.     Cardiovascular: Regular rhythm, S1 and S2 normal, no S3 or S4. Apical impulse not displaced. No murmurs    Pulmonary/Chest: Chest: No chest wall tenderness no rales and wheezing    Abdominal: Abdomen soft, bowel sounds normoactive, no masses.    Musculoskeletal: No deformities, clubbing, cyanosis, erythema. Positive mild edema.    Neurological: No gross motor or sensory deficits noted    Skin: Warm and dry to the touch, no apparent skin lesions .     Psychiatric: Normal mood and affect. Behavior is normal.    Radiology Review    XR Chest 1 View   Final Result      No evidence of acute cardiopulmonary disease on this single " view   chest x-ray.      Electronically signed by:  Gaurav Carreon   1/6/2023 1:30 PM CST   Workstation: [id number]          Lab Results:  Lab Results (last 24 hours)     Procedure Component Value Units Date/Time    POC Glucose Once [[id number]]  (Abnormal) Collected: 01/07/23 1008    Specimen: Blood Updated: 01/07/23 1041     Glucose 219 mg/dL      Comment: RN NotifiedOperator: [id number] JOE Dumont ID: [id number]       Basic Metabolic Panel [[id number]]  (Abnormal) Collected: 01/07/23 0653    Specimen: Blood Updated: 01/07/23 0740     Glucose 212 mg/dL      BUN 12 mg/dL      Creatinine 0.97 mg/dL      Sodium 139 mmol/L      Potassium 3.6 mmol/L      Chloride 101 mmol/L      CO2 28.0 mmol/L      Calcium 9.2 mg/dL      BUN/Creatinine Ratio 12.4     Anion Gap 10.0 mmol/L      eGFR 66.6 mL/min/1.73      Comment: National Kidney Foundation and American Society of Nephrology (ASN) Task Force recommended calculation based on the Chronic Kidney Disease Epidemiology Collaboration (CKD-EPI) equation refit without adjustment for race.       Narrative:      GFR Normal >60  Chronic Kidney Disease <60  Kidney Failure <15      Magnesium [[id number]]  (Normal) Collected: 01/07/23 0653    Specimen: Blood Updated: 01/07/23 0740     Magnesium 1.8 mg/dL     CBC & Differential [[id number]]  (Normal) Collected: 01/07/23 0653    Specimen: Blood Updated: 01/07/23 0709    Narrative:      The following orders were created for panel order CBC & Differential.  Procedure                               Abnormality         Status                     ---------                               -----------         ------                     CBC Auto Differential[[id number]]        Normal              Final result                 Please view results for these tests on the individual orders.    CBC Auto Differential [[id number]]  (Normal) Collected: 01/07/23 0653    Specimen: Blood Updated: 01/07/23 0709     WBC 9.28 10*3/mm3      RBC 4.26  10*6/mm3      Hemoglobin 12.2 g/dL      Hematocrit 37.5 %      MCV 88.0 fL      MCH 28.6 pg      MCHC 32.5 g/dL      RDW 13.3 %      RDW-SD 42.8 fl      MPV 9.1 fL      Platelets 373 10*3/mm3      Neutrophil % 66.7 %      Lymphocyte % 22.6 %      Monocyte % 6.3 %      Eosinophil % 3.7 %      Basophil % 0.4 %      Immature Grans % 0.3 %      Neutrophils, Absolute 6.19 10*3/mm3      Lymphocytes, Absolute 2.10 10*3/mm3      Monocytes, Absolute 0.58 10*3/mm3      Eosinophils, Absolute 0.34 10*3/mm3      Basophils, Absolute 0.04 10*3/mm3      Immature Grans, Absolute 0.03 10*3/mm3      nRBC 0.0 /100 WBC     POC Glucose Once [[id number]]  (Abnormal) Collected: 01/07/23 0613    Specimen: Blood Updated: 01/07/23 0640     Glucose 214 mg/dL      Comment: RN NotifiedOperator: [id number] AGUSTINA SEOPPDaiMeter ID: [id number]       POC Glucose Once [[id number]]  (Abnormal) Collected: 01/06/23 1902    Specimen: Blood Updated: 01/06/23 2012     Glucose 137 mg/dL      Comment: RN NotifiedOperator: [id number] BERRIOS BKDONAFERMeter ID: [id number]       POC Glucose Once [[id number]]  (Abnormal) Collected: 01/06/23 1828    Specimen: Blood Updated: 01/06/23 1852     Glucose 145 mg/dL      Comment: RN NotifiedOperator: [id number] JOE GARCIAguilherme ID: [id number]       Troponin [[id number]]  (Normal) Collected: 01/06/23 1421    Specimen: Blood Updated: 01/06/23 1450     Troponin T <0.010 ng/mL     Narrative:      Troponin T Reference Range:  <= 0.03 ng/mL-   Negative for AMI  >0.03 ng/mL-     Abnormal for myocardial necrosis.  Clinicians would have to utilize clinical acumen, EKG, Troponin and serial changes to determine if it is an Acute Myocardial Infarction or myocardial injury due to an underlying chronic condition.       Results may be falsely decreased if patient taking Biotin.      Comprehensive Metabolic Panel [[id number]]  (Abnormal) Collected: 01/06/23 1051    Specimen: Blood Updated: 01/06/23 1241     Glucose 204 mg/dL      BUN  13 mg/dL      Creatinine 1.02 mg/dL      Sodium 139 mmol/L      Potassium 3.6 mmol/L      Chloride 100 mmol/L      CO2 30.0 mmol/L      Calcium 9.6 mg/dL      Total Protein 7.1 g/dL      Albumin 4.1 g/dL      ALT (SGPT) 18 U/L      AST (SGOT) 14 U/L      Alkaline Phosphatase 120 U/L      Total Bilirubin 0.3 mg/dL      Globulin 3.0 gm/dL      A/G Ratio 1.4 g/dL      BUN/Creatinine Ratio 12.7     Anion Gap 9.0 mmol/L      eGFR 62.7 mL/min/1.73      Comment: National Kidney Foundation and American Society of Nephrology (ASN) Task Force recommended calculation based on the Chronic Kidney Disease Epidemiology Collaboration (CKD-EPI) equation refit without adjustment for race.       Narrative:      GFR Normal >60  Chronic Kidney Disease <60  Kidney Failure <15      Troponin [[id number]]  (Normal) Collected: 01/06/23 1051    Specimen: Blood Updated: 01/06/23 1239     Troponin T <0.010 ng/mL     Narrative:      Troponin T Reference Range:  <= 0.03 ng/mL-   Negative for AMI  >0.03 ng/mL-     Abnormal for myocardial necrosis.  Clinicians would have to utilize clinical acumen, EKG, Troponin and serial changes to determine if it is an Acute Myocardial Infarction or myocardial injury due to an underlying chronic condition.       Results may be falsely decreased if patient taking Biotin.      BNP [[id number]]  (Normal) Collected: 01/06/23 1051    Specimen: Blood Updated: 01/06/23 1238     proBNP 246.8 pg/mL     Narrative:      Among patients with dyspnea, NT-proBNP is highly sensitive for the detection of acute congestive heart failure. In addition NT-proBNP of <300 pg/ml effectively rules out acute congestive heart failure with 99% negative predictive value.      [place] Draw [[id number]] Collected: 01/06/23 1051    Specimen: Blood Updated: 01/06/23 1230    Narrative:      The following orders were created for panel order [place] Draw.  Procedure                               Abnormality         Status                     ---------                                -----------         ------                     Green Top (Gel)[[id number]]                                  Final result               Lavender Top[[id number]]                                     Final result               Gold Top - SST[[id number]]                                   Final result               Light Blue Top[[id number]]                                   Final result                 Please view results for these tests on the individual orders.    Green Top (Gel) [[id number]] Collected: 01/06/23 1051    Specimen: Blood Updated: 01/06/23 1230     Extra Tube Hold for add-ons.     Comment: Auto resulted.       Lavender Top [[id number]] Collected: 01/06/23 1051    Specimen: Blood Updated: 01/06/23 1230     Extra Tube hold for add-on     Comment: Auto resulted       Gold Top - SST [[id number]] Collected: 01/06/23 1051    Specimen: Blood Updated: 01/06/23 1230     Extra Tube Hold for add-ons.     Comment: Auto resulted.       Light Blue Top [[id number]] Collected: 01/06/23 1051    Specimen: Blood Updated: 01/06/23 1230     Extra Tube Hold for add-ons.     Comment: Auto resulted       CBC & Differential [[id number]]  (Abnormal) Collected: 01/06/23 1051    Specimen: Blood Updated: 01/06/23 1221    Narrative:      The following orders were created for panel order CBC & Differential.  Procedure                               Abnormality         Status                     ---------                               -----------         ------                     CBC Auto Differential[[id number]]        Abnormal            Final result                 Please view results for these tests on the individual orders.    CBC Auto Differential [[id number]]  (Abnormal) Collected: 01/06/23 1051    Specimen: Blood Updated: 01/06/23 1221     WBC 12.39 10*3/mm3      RBC 4.50 10*6/mm3      Hemoglobin 12.9 g/dL      Hematocrit 39.4 %      MCV 87.6 fL      MCH 28.7 pg      MCHC 32.7 g/dL      RDW 13.2 %      RDW-SD  41.6 fl      MPV 9.1 fL      Platelets 421 10*3/mm3      Neutrophil % 71.2 %      Lymphocyte % 17.8 %      Monocyte % 7.4 %      Eosinophil % 2.9 %      Basophil % 0.4 %      Immature Grans % 0.3 %      Neutrophils, Absolute 8.82 10*3/mm3      Lymphocytes, Absolute 2.20 10*3/mm3      Monocytes, Absolute 0.92 10*3/mm3      Eosinophils, Absolute 0.36 10*3/mm3      Basophils, Absolute 0.05 10*3/mm3      Immature Grans, Absolute 0.04 10*3/mm3      nRBC 0.0 /100 WBC           I personally viewed and interpreted the patient's EKG/Telemetry data       Assessment/Plan:       #1 atypical discomfort predominantly right upper quadrant and epigastric area    Patient with history of gastroparesis and admitted for evaluation of right upper quadrant epigastric discomfort.  EKG no acute ST-T wave changes cardiac exams are negative.  My suspicion for active ischemia is very low.  Recommend IV Nexium and long-term proton initiations.  Recommend GI evaluation as an outpatient    #2 CAD s/p CABG    Based on the last cardiac catheterization patient is not a candidate for invasive cardiac evaluations.  And my suspicion for ischemia is very low based on the description of discomfort.    Commend to continue antiplatelet, Imdur, Ranexa, beta and statin.    Admit to follow with Dr. Sp lozano    Hypertension  Continue amlodipine continue beta-blocker    Patient counseled educated regarding risk factor lifestyle modification    Class I obesity with BMI of 39.53    Significant low-carb weight, low-fat, DASH diet graded exercise discussed.              This document has been electronically signed by Justus Jim MD on January 7, 2023 11:21 CST          Thank you for allowing me to participate in the care of Ariana Martinez. Feel free to contact me directly with any further questions or concerns.    Justus Jim MD  01/07/23  11:11 CST.      Part of this note may be an electronic transcription/translation of spoken language to printed  text using the Dragon Dictation system.

## 2023-01-07 NOTE — DISCHARGE SUMMARY
[facility] Medicine Services  DISCHARGE SUMMARY       Date of Admission: 1/6/2023  Date of Discharge:  1/7/2023  Primary Care Physician: Dolly Foss APRN    Presenting Problem/History of Present Illness:  Chest pain, unspecified type [R07.9]       Final Discharge Diagnoses:  Active Hospital Problems    Diagnosis    • Chest pain, unspecified type    • Gastroparesis        Consults:   Consults     Date and Time Order Name Status Description    1/7/2023  8:37 AM Inpatient Cardiology Consult Completed           Procedures Performed:                 Pertinent Test Results:   Lab Results (most recent)     Procedure Component Value Units Date/Time    POC Glucose Once [[id number]]  (Abnormal) Collected: 01/07/23 1008    Specimen: Blood Updated: 01/07/23 1041     Glucose 219 mg/dL      Comment: RN NotifiedOperator: [id number] JOE Dumont ID: [id number]       Basic Metabolic Panel [[id number]]  (Abnormal) Collected: 01/07/23 0653    Specimen: Blood Updated: 01/07/23 0740     Glucose 212 mg/dL      BUN 12 mg/dL      Creatinine 0.97 mg/dL      Sodium 139 mmol/L      Potassium 3.6 mmol/L      Chloride 101 mmol/L      CO2 28.0 mmol/L      Calcium 9.2 mg/dL      BUN/Creatinine Ratio 12.4     Anion Gap 10.0 mmol/L      eGFR 66.6 mL/min/1.73      Comment: National Kidney Foundation and American Society of Nephrology (ASN) Task Force recommended calculation based on the Chronic Kidney Disease Epidemiology Collaboration (CKD-EPI) equation refit without adjustment for race.       Narrative:      GFR Normal >60  Chronic Kidney Disease <60  Kidney Failure <15      Magnesium [[id number]]  (Normal) Collected: 01/07/23 0653    Specimen: Blood Updated: 01/07/23 0740     Magnesium 1.8 mg/dL     CBC & Differential [[id number]]  (Normal) Collected: 01/07/23 0653    Specimen: Blood Updated: 01/07/23 0709    Narrative:      The following orders were created for panel order CBC &  Differential.  Procedure                               Abnormality         Status                     ---------                               -----------         ------                     CBC Auto Differential[[id number]]        Normal              Final result                 Please view results for these tests on the individual orders.    CBC Auto Differential [[id number]]  (Normal) Collected: 01/07/23 0653    Specimen: Blood Updated: 01/07/23 0709     WBC 9.28 10*3/mm3      RBC 4.26 10*6/mm3      Hemoglobin 12.2 g/dL      Hematocrit 37.5 %      MCV 88.0 fL      MCH 28.6 pg      MCHC 32.5 g/dL      RDW 13.3 %      RDW-SD 42.8 fl      MPV 9.1 fL      Platelets 373 10*3/mm3      Neutrophil % 66.7 %      Lymphocyte % 22.6 %      Monocyte % 6.3 %      Eosinophil % 3.7 %      Basophil % 0.4 %      Immature Grans % 0.3 %      Neutrophils, Absolute 6.19 10*3/mm3      Lymphocytes, Absolute 2.10 10*3/mm3      Monocytes, Absolute 0.58 10*3/mm3      Eosinophils, Absolute 0.34 10*3/mm3      Basophils, Absolute 0.04 10*3/mm3      Immature Grans, Absolute 0.03 10*3/mm3      nRBC 0.0 /100 WBC     POC Glucose Once [[id number]]  (Abnormal) Collected: 01/07/23 0613    Specimen: Blood Updated: 01/07/23 0640     Glucose 214 mg/dL      Comment: RN NotifiedOperator: [id number] BERRIOS BKNIAbrazo Arrowhead CampusMeter ID: [id number]       Troponin [[id number]]  (Normal) Collected: 01/06/23 1421    Specimen: Blood Updated: 01/06/23 1450     Troponin T <0.010 ng/mL     Narrative:      Troponin T Reference Range:  <= 0.03 ng/mL-   Negative for AMI  >0.03 ng/mL-     Abnormal for myocardial necrosis.  Clinicians would have to utilize clinical acumen, EKG, Troponin and serial changes to determine if it is an Acute Myocardial Infarction or myocardial injury due to an underlying chronic condition.       Results may be falsely decreased if patient taking Biotin.      Comprehensive Metabolic Panel [[id number]]  (Abnormal) Collected: 01/06/23 1051    Specimen: Blood  Updated: 01/06/23 1241     Glucose 204 mg/dL      BUN 13 mg/dL      Creatinine 1.02 mg/dL      Sodium 139 mmol/L      Potassium 3.6 mmol/L      Chloride 100 mmol/L      CO2 30.0 mmol/L      Calcium 9.6 mg/dL      Total Protein 7.1 g/dL      Albumin 4.1 g/dL      ALT (SGPT) 18 U/L      AST (SGOT) 14 U/L      Alkaline Phosphatase 120 U/L      Total Bilirubin 0.3 mg/dL      Globulin 3.0 gm/dL      A/G Ratio 1.4 g/dL      BUN/Creatinine Ratio 12.7     Anion Gap 9.0 mmol/L      eGFR 62.7 mL/min/1.73      Comment: National Kidney Foundation and American Society of Nephrology (ASN) Task Force recommended calculation based on the Chronic Kidney Disease Epidemiology Collaboration (CKD-EPI) equation refit without adjustment for race.       Narrative:      GFR Normal >60  Chronic Kidney Disease <60  Kidney Failure <15      Troponin [[id number]]  (Normal) Collected: 01/06/23 1051    Specimen: Blood Updated: 01/06/23 1239     Troponin T <0.010 ng/mL     Narrative:      Troponin T Reference Range:  <= 0.03 ng/mL-   Negative for AMI  >0.03 ng/mL-     Abnormal for myocardial necrosis.  Clinicians would have to utilize clinical acumen, EKG, Troponin and serial changes to determine if it is an Acute Myocardial Infarction or myocardial injury due to an underlying chronic condition.       Results may be falsely decreased if patient taking Biotin.      BNP [[id number]]  (Normal) Collected: 01/06/23 1051    Specimen: Blood Updated: 01/06/23 1238     proBNP 246.8 pg/mL     Narrative:      Among patients with dyspnea, NT-proBNP is highly sensitive for the detection of acute congestive heart failure. In addition NT-proBNP of <300 pg/ml effectively rules out acute congestive heart failure with 99% negative predictive value.      [place] Draw [[id number]] Collected: 01/06/23 1051    Specimen: Blood Updated: 01/06/23 1230    Narrative:      The following orders were created for panel order [place] Draw.  Procedure                                Abnormality         Status                     ---------                               -----------         ------                     Green Top (Gel)[[id number]]                                  Final result               Lavender Top[[id number]]                                     Final result               Gold Top - SST[[id number]]                                   Final result               Light Blue Top[[id number]]                                   Final result                 Please view results for these tests on the individual orders.    Green Top (Gel) [[id number]] Collected: 01/06/23 1051    Specimen: Blood Updated: 01/06/23 1230     Extra Tube Hold for add-ons.     Comment: Auto resulted.       Lavender Top [[id number]] Collected: 01/06/23 1051    Specimen: Blood Updated: 01/06/23 1230     Extra Tube hold for add-on     Comment: Auto resulted       Gold Top - SST [[id number]] Collected: 01/06/23 1051    Specimen: Blood Updated: 01/06/23 1230     Extra Tube Hold for add-ons.     Comment: Auto resulted.       Light Blue Top [[id number]] Collected: 01/06/23 1051    Specimen: Blood Updated: 01/06/23 1230     Extra Tube Hold for add-ons.     Comment: Auto resulted       CBC & Differential [[id number]]  (Abnormal) Collected: 01/06/23 1051    Specimen: Blood Updated: 01/06/23 1221    Narrative:      The following orders were created for panel order CBC & Differential.  Procedure                               Abnormality         Status                     ---------                               -----------         ------                     CBC Auto Differential[[id number]]        Abnormal            Final result                 Please view results for these tests on the individual orders.    CBC Auto Differential [[id number]]  (Abnormal) Collected: 01/06/23 1051    Specimen: Blood Updated: 01/06/23 1221     WBC 12.39 10*3/mm3      RBC 4.50 10*6/mm3      Hemoglobin 12.9 g/dL      Hematocrit 39.4 %      MCV 87.6 fL      MCH  28.7 pg      MCHC 32.7 g/dL      RDW 13.2 %      RDW-SD 41.6 fl      MPV 9.1 fL      Platelets 421 10*3/mm3      Neutrophil % 71.2 %      Lymphocyte % 17.8 %      Monocyte % 7.4 %      Eosinophil % 2.9 %      Basophil % 0.4 %      Immature Grans % 0.3 %      Neutrophils, Absolute 8.82 10*3/mm3      Lymphocytes, Absolute 2.20 10*3/mm3      Monocytes, Absolute 0.92 10*3/mm3      Eosinophils, Absolute 0.36 10*3/mm3      Basophils, Absolute 0.05 10*3/mm3      Immature Grans, Absolute 0.04 10*3/mm3      nRBC 0.0 /100 WBC         Imaging Results (Most Recent)     Procedure Component Value Units Date/Time    XR Chest 1 View [[id number]] Collected: 01/06/23 0000     Updated: 01/06/23 1332    Narrative:      EXAM: XR CHEST 1 VIEW     HISTORY: Chest Pain triage protocol  Chest Pain Triage Protocol.    COMPARISON: December 22, 2020    FINDINGS:    Heart: Enlarged    Mediastinum: Midline sternotomy    Pleura: No significant effusion    Lungs: No consolidation. Lung bases are partially obscured.    Bones: No acute abnormality.    Abdomen: Visualized upper abdomen is unremarkable      Impression:        No evidence of acute cardiopulmonary disease on this single view  chest x-ray.    Electronically signed by:  Gaurav Carreon DO  1/6/2023 1:30 PM CST  Workstation: [id number]          Chief Complaint on Day of Discharge: None    Hospital Course:  The patient is a 63 y.o. female who presented to the emergency department on 1/6/2023 with chief complaint of chest pain.  She described the chest pain as midepigastric, radiating around her right lower rib cage/upper abdomen to her back.  She deniesd associated radiation.  She complained of mild associated nausea but no vomiting.  Also felt short of breath at times.  She was laying in bed when the pain started.  The pain was continuous, did not resolve until she received 1 sublingual nitroglycerin and Dilaudid here in the emergency department.  She has a long history of cardiac issues  "including history of CABG, history of PCI, CHF.  Most recent left heart cath in 2022 showing the followin.  One-vessel obstructive coronary artery disease involving chronic total occlusion of the LAD.  LIMA to LAD is patent, target vessel is small in caliber with severe diffuse disease but not amenable to PCI  2.  Patent stents in the LAD extending into the diagonal  3.  Mild disease in the RCA and left circumflex which is unchanged from before  4.  Normal left-sided filling pressures     She denied dark or tarry sticky stools, hematemesis, vomiting.    She was evaluated by cardiology who felt that the pain was more likely to be GI related given her history of gastroparesis.  Patient had pain relief after IV Nexium and GI Cocktail.  She will be discharged with close follow-up with Dr. Snowden and her PCP.    Condition on Discharge: Stable    Physical Exam on Discharge:  /55 (BP Location: Right arm, Patient Position: Lying)   Pulse 62   Temp 98 °F (36.7 °C) (Temporal)   Resp 18   Ht 172.7 cm (68\")   Wt 118 kg (260 lb)   SpO2 96%   BMI 39.53 kg/m²     Vitals and nursing note reviewed.   Constitutional:       General: No acute distress.     Appearance: Normal appearance.   HENT:      Head: Normocephalic and atraumatic.      Right Ear: External ear normal.      Left Ear: External ear normal.      Nose: Nose normal.      Mouth/Throat:      Mouth: Mucous membranes are moist.   Eyes:      Conjunctivae/sclerae: Conjunctivae normal.      Pupils: Pupils are equal, round, and reactive to light.   Cardiovascular:      Rate and Rhythm: Normal rate and regular rhythm.   Pulmonary:      Effort: Pulmonary effort is normal.      Breath sounds: Normal breath sounds.   Abdominal:      General: Abdomen is flat.      Palpations: Abdomen is soft.      Tenderness: Mild midepigastric tenderness without rebound or guarding  Musculoskeletal:         General: No signs of injury. Normal range of motion.      Cervical back: " No tenderness.   Skin:     General: Skin is warm and dry.   Neurological:      General: No focal deficit present.      Mental Status: Alert and oriented to person, place, and time.   Psychiatric:         Mood and Affect: Mood normal.         Behavior: Behavior normal.         Discharge Medications:     Discharge Medications      Continue These Medications      Instructions Start Date   Accu-Chek Guide test strip  Generic drug: glucose blood   1 each, Other, 4 Times Daily, To test blood sugar 4X daily. For  Dx E11.65      accu-chek soft touch lancets   As directed      amLODIPine 5 MG tablet  Commonly known as: NORVASC   5 mg, Oral, Daily      ARIPiprazole 5 MG tablet  Commonly known as: ABILIFY   TAKE 1 TABLET BY MOUTH EVERY DAY      aspirin  MG tablet   325 mg, Oral, Daily      atorvastatin 80 MG tablet  Commonly known as: LIPITOR   80 mg, Oral, Daily      B-D ULTRAFINE III SHORT PEN 31G X 8 MM misc  Generic drug: Insulin Pen Needle   USE TO INJECT 5 TIMES A DAY      B-D ULTRAFINE III SHORT PEN 31G X 8 MM misc  Generic drug: Insulin Pen Needle   USE UP TO 4 (FOUR) TIMES DAILY WITH INSULIN AS DIRECTED.      BASAGLAR KWIKPEN 100 UNIT/ML injection pen   Inject 40 units into the appropriate area every morning and 35 units into the appropriate area every night      BD [employer] misc   1 each, Does not apply, Take As Directed      butalbital-acetaminophen-caffeine -40 MG per tablet  Commonly known as: FIORICET, ESGIC   Take one to two tablets by mouth per headache episode as needed.      Calcium Citrate-Vitamin D 250-200 MG-UNIT tablet   2 tablets, Oral, 2 Times Daily      clopidogrel 75 MG tablet  Commonly known as: PLAVIX   75 mg, Oral, Daily      [employer] Diclofenac Sodium 1 % gel gel  Generic drug: Diclofenac Sodium   APPLY 4 G TOPICALLY TO THE APPROPRIATE AREA AS DIRECTED 2 (TWO) TIMES A DAY. SMALL AMOUNT TO AFFECTED AREA      cyanocobalamin 1000 MCG/ML injection   1,000 mcg, Intramuscular, Every  "28 Days      Easy Touch FlipLock Needles 25G X 1-1/2\" misc  Generic drug: Needle (Disp)   1 each, Does not apply, Every 28 Days, For administering B12 cyanocobalomin. Dx vitamin B12 deficiency.      folic acid 1 MG tablet  Commonly known as: FOLVITE   TAKE 1 TABLET BY MOUTH EVERY DAY      furosemide 40 MG tablet  Commonly known as: LASIX   TAKE 1 TABLET BY MOUTH EVERY DAY      gabapentin 100 MG capsule  Commonly known as: NEURONTIN   100 mg, Oral, Every 12 Hours      glucose monitor monitoring kit   1 each, Does not apply, Daily, accucheck eve meter, E11.9      hydroCHLOROthiazide 12.5 MG tablet  Commonly known as: HYDRODIURIL   TAKE 1 TABLET BY MOUTH EVERY DAY      HYDROcodone-acetaminophen 7.5-325 MG per tablet  Commonly known as: NORCO   1 tablet, Oral, Every 6 Hours PRN      Hypodermic Needle 20G X 1\" misc   1 each, Does not apply, Every 28 Days, For drawing up B12 for injection monthly, change back to smaller gauge needle prior to injection. Dx Vitamin B12  Deficiency.      isosorbide mononitrate 30 MG 24 hr tablet  Commonly known as: IMDUR   TAKE 1 TABLET BY MOUTH EVERY DAY      levothyroxine 25 MCG tablet  Commonly known as: SYNTHROID, LEVOTHROID   25 mcg, Oral, Daily      meclizine 25 MG tablet  Commonly known as: ANTIVERT   25 mg, Oral, 3 Times Daily PRN      meloxicam 15 MG tablet  Commonly known as: MOBIC   TAKE 1 TABLET BY MOUTH EVERY DAY WITH FOOD      methocarbamol 500 MG tablet  Commonly known as: ROBAXIN   TAKE 1 TABLET BY MOUTH 2 TIMES A DAY AS NEEDED FOR MUSCLE SPASMS.      metoclopramide 10 MG tablet  Commonly known as: REGLAN   10 mg, Oral, 4 Times Daily PRN      metoclopramide 5 MG tablet  Commonly known as: REGLAN   5 mg, Oral, 3 Times Daily PRN      metoprolol succinate XL 50 MG 24 hr tablet  Commonly known as: TOPROL-XL   50 mg, Oral, Daily, Dose increased 5/24/21      naloxone 0.4 MG/ML injection  Commonly known as: NARCAN   0.2 mg, Intravenous, Every 5 Minutes PRN      nitroglycerin 0.4 " "MG SL tablet  Commonly known as: NITROSTAT   0.4 mg, Sublingual, Every 5 Minutes PRN, Take no more than 3 doses in 15 minutes.      NovoLOG FlexPen 100 UNIT/ML solution pen-injector sc pen  Generic drug: insulin aspart   Inject up to 45 units  3 TIMES A DAY BEFORE MEALS      ondansetron 8 MG tablet  Commonly known as: ZOFRAN   8 mg, Oral, Every 8 Hours PRN      ondansetron ODT 4 MG disintegrating tablet  Commonly known as: ZOFRAN-ODT   4 mg, Translingual, 4 Times Daily PRN      ONE TOUCH ULTRA MINI w/Device kit   Patient will need the Accu-Check [employer] meter      pantoprazole 20 MG EC tablet  Commonly known as: PROTONIX   40 mg, Oral, Daily      ranolazine 500 MG 12 hr tablet  Commonly known as: RANEXA   500 mg, Oral, Every 12 Hours Scheduled      Syringe 20G X 1-1/2\" 3 ML misc   1 each, Does not apply, Every 28 Days, For injection cyanocobalomin, Dx vit B12 deficiency.      venlafaxine XR 75 MG 24 hr capsule  Commonly known as: EFFEXOR-XR   75 mg, Oral, Daily With Breakfast      vitamin D 1.25 MG (42098 UT) capsule capsule  Commonly known as: ERGOCALCIFEROL   TAKE 1 CAPSULE BY MOUTH EVERY 7 DAYS.             Discharge Diet:   Diet Instructions     Diet: Consistent Carbohydrate, Cardiac      Discharge Diet:  Consistent Carbohydrate  Cardiac              Activity at Discharge:   Activity Instructions     Activity as Tolerated             Discharge Care Plan/Instructions:     Chest pain, history of CABG and PCI  -Somewhat atypical chest pain to midepigastric/right upper quadrant/right lower chest wall pain, resolved with 1 nitroglycerin  - Long history of coronary artery disease with history of CABG and PCI  - Sees Dr. Snowden outpatient, will follow closely outpatient     Peptic ulcer disease  -Protonix     Gastroparesis  -Protonix     Type 2 diabetes  -Sliding scale     Hypertension  Hyperlipidemia  Anxiety  Depression  GERD  Obesity  Many years      Follow-up Appointments:   Future Appointments   Date Time Provider " Department Center   1/11/2023  9:30 AM Meera Morales, BEBE KOHLER OSCR [facility]   1/13/2023  8:15 AM Elvis Gamboa APRN [facility] END [facility]   1/18/2023 10:15 AM [facility] MAMM 1 [facility]   3/3/2023 10:00 AM Dolly Foss, BEBE KOHLER PC POW [facility]   3/17/2023  9:00 AM [facility] HV ECHO ROOM 2 [facility] CARD [place]   4/17/2023 10:30 AM Bill Gibson MD MG[facility] CD [facility] None   5/2/2023  9:00 AM Marcela Lawson APRN [facility] GE [facility]   6/20/2023  1:00 PM NURSE [facility] OPI [facility]   6/20/2023  1:30 PM Teressa Hammond, BEBE KOHLER ONC [facility]       Test Results Pending at Discharge:       Copied text in this note has been reviewed and is accurate as of 1/7/2023     Time: 32 minutes

## 2023-01-07 NOTE — PROGRESS NOTES
[facility] Medicine   INPATIENT PROGRESS NOTE      Patient Name: Ariana Martinez  Date of Admission: 2023  Today's Date: 23  Length of Stay: 0  Primary Care Physician: Dolly Foss APRN    Subjective   Chief Complaint and HPI:  The patient is a 63 y.o. female who presented to the emergency department on 2023 with chief complaint of chest pain.  She described the chest pain as midepigastric, radiating around her right lower rib cage/upper abdomen to her back.  She deniesd associated radiation.  She complained of mild associated nausea but no vomiting.  Also felt short of breath at times.  She was laying in bed when the pain started.  The pain was continuous, did not resolve until she received 1 sublingual nitroglycerin and Dilaudid here in the emergency department.  She has a long history of cardiac issues including history of CABG, history of PCI, CHF.  Most recent left heart cath in 2022 showing the followin.  One-vessel obstructive coronary artery disease involving chronic total occlusion of the LAD.  LIMA to LAD is patent, target vessel is small in caliber with severe diffuse disease but not amenable to PCI  2.  Patent stents in the LAD extending into the diagonal  3.  Mild disease in the RCA and left circumflex which is unchanged from before  4.  Normal left-sided filling pressures     She denied dark or tarry sticky stools, hematemesis, vomiting.     She was evaluated by cardiology who felt that the pain was more likely to be GI related given her history of gastroparesis.    Today the patient states that her pain came back after the IV Nexium.  She cannot eat lunch because of the pain.  She gets slight relief from Dilaudid.  No vomiting, hematemesis, melena reported.        Review of Systems   Review of Systems as of 23   Constitutional: Negative.    HENT: Negative.    Eyes: Negative.    Respiratory: Negative.     Cardiovascular: Negative.    Gastrointestinal: Positive for abdominal pain  Endocrine: Negative.    Genitourinary: Negative.    Musculoskeletal: Negative.    Skin: Negative.    Neurological: Negative.    Psychiatric/Behavioral: Negative.      All pertinent negatives and positives are as above. All other systems have been reviewed and are negative unless otherwise stated.     Objective    Temp:  [96.8 °F (36 °C)-98 °F (36.7 °C)] 98 °F (36.7 °C)  Heart Rate:  [57-68] 62  Resp:  [16-20] 18  BP: (102-153)/(54-63) 102/55  Physical Exam    Vitals and nursing note reviewed.   Constitutional:       General: No acute distress.     Appearance: Normal appearance.   HENT:      Head: Normocephalic and atraumatic.      Mouth: Mucous membranes are moist.   Eyes:      Conjunctivae/sclerae: Conjunctivae normal.      Pupils: Pupils are equal, round, and reactive to light.   Cardiovascular:      Rate and Rhythm: Normal rate and regular rhythm.   Pulmonary:      Effort: Pulmonary effort is normal.      Breath sounds: Normal breath sounds.   Abdominal:      General: Abdomen is flat.      Palpations: Abdomen is soft.      Tenderness: Mild midepigastric and right upper quadrant tenderness that rebound or guarding  Musculoskeletal:         General: No signs of injury. Normal range of motion.   Skin:     General: Skin is warm and dry.   Neurological:      General: No focal deficit present.      Mental Status: Alert and oriented  Psychiatric:         Mood and Affect: Mood normal.         Behavior: Behavior normal.         Results Review:  I have reviewed the labs, radiology results, and diagnostic studies.    Laboratory Data:   Results from last 7 days   Lab Units 01/07/23  0653 01/06/23  1051   WBC 10*3/mm3 9.28 12.39*   HEMOGLOBIN g/dL 12.2 12.9   HEMATOCRIT % 37.5 39.4   PLATELETS 10*3/mm3 373 421        Results from last 7 days   Lab Units 01/07/23  0653 01/06/23  1051   SODIUM mmol/L 139 139   POTASSIUM mmol/L 3.6 3.6   CHLORIDE  mmol/L 101 100   CO2 mmol/L 28.0 30.0*   BUN mg/dL 12 13   CREATININE mg/dL 0.97 1.02*   CALCIUM mg/dL 9.2 9.6   BILIRUBIN mg/dL  --  0.3   ALK PHOS U/L  --  120*   ALT (SGPT) U/L  --  18   AST (SGOT) U/L  --  14   GLUCOSE mg/dL 212* 204*       Culture Data:   No results found for: BLOODCX  No results found for: URINECX  No results found for: RESPCX  No results found for: WOUNDCX  No results found for: STOOLCX  No components found for: BODYFLD    Radiology Data:   Imaging Results (Last 24 Hours)     ** No results found for the last 24 hours. **          I have reviewed the patient's current medications.     Assessment/Plan     .MIPSCURRENTMEDS    Active Hospital Problems    Diagnosis    • Chest pain, unspecified type    • Gastroparesis        Chest pain, history of CABG and PCI  -Somewhat atypical chest pain to midepigastric/right upper quadrant/right lower chest wall pain, resolved with 1 nitroglycerin  - Long history of coronary artery disease with history of CABG and PCI  - Sees Dr. Snowden outpatient, will follow closely outpatient     Peptic ulcer disease  -Protonix     Gastroparesis  -Protonix     Type 2 diabetes  -Sliding scale     Hypertension  Hyperlipidemia  Anxiety  Depression  GERD  Obesity  Ménière's    Discharge Planning: I expect the patient to be discharged in 1 to 2 days      Copied text in this note has been reviewed and is accurate as of 1/7/2023       Electronically signed by Marleny Montoya PA-C, 01/07/23, 17:42 CST.

## 2023-01-07 NOTE — PLAN OF CARE
Goal Outcome Evaluation:  Plan of Care Reviewed With: patient           Outcome Evaluation: Pt has been in pain; medicated. PRN Zofran was administered this AM. Will continue to monitor.

## 2023-01-08 ENCOUNTER — APPOINTMENT (OUTPATIENT)
Dept: CT IMAGING | Facility: HOSPITAL | Age: 63
End: 2023-01-08
Payer: COMMERCIAL

## 2023-01-08 LAB
ANION GAP SERPL CALCULATED.3IONS-SCNC: 11 MMOL/L (ref 5–15)
BASOPHILS # BLD AUTO: 0.05 10*3/MM3 (ref 0–0.2)
BASOPHILS NFR BLD AUTO: 0.5 % (ref 0–1.5)
BILIRUB UR QL STRIP: NEGATIVE
BUN SERPL-MCNC: 14 MG/DL (ref 8–23)
BUN/CREAT SERPL: 12.7 (ref 7–25)
CALCIUM SPEC-SCNC: 9.4 MG/DL (ref 8.6–10.5)
CHLORIDE SERPL-SCNC: 100 MMOL/L (ref 98–107)
CLARITY UR: CLEAR
CO2 SERPL-SCNC: 28 MMOL/L (ref 22–29)
COLOR UR: YELLOW
CREAT SERPL-MCNC: 1.1 MG/DL (ref 0.57–1)
DEPRECATED RDW RBC AUTO: 42.5 FL (ref 37–54)
EGFRCR SERPLBLD CKD-EPI 2021: 57.3 ML/MIN/1.73
EOSINOPHIL # BLD AUTO: 0.32 10*3/MM3 (ref 0–0.4)
EOSINOPHIL NFR BLD AUTO: 3.5 % (ref 0.3–6.2)
ERYTHROCYTE [DISTWIDTH] IN BLOOD BY AUTOMATED COUNT: 13.3 % (ref 12.3–15.4)
GLUCOSE BLDC GLUCOMTR-MCNC: 112 MG/DL (ref 70–130)
GLUCOSE BLDC GLUCOMTR-MCNC: 125 MG/DL (ref 70–130)
GLUCOSE BLDC GLUCOMTR-MCNC: 142 MG/DL (ref 70–130)
GLUCOSE BLDC GLUCOMTR-MCNC: 168 MG/DL (ref 70–130)
GLUCOSE SERPL-MCNC: 111 MG/DL (ref 65–99)
GLUCOSE UR STRIP-MCNC: NEGATIVE MG/DL
HCT VFR BLD AUTO: 40.8 % (ref 34–46.6)
HGB BLD-MCNC: 13.2 G/DL (ref 12–15.9)
HGB UR QL STRIP.AUTO: NEGATIVE
IMM GRANULOCYTES # BLD AUTO: 0.04 10*3/MM3 (ref 0–0.05)
IMM GRANULOCYTES NFR BLD AUTO: 0.4 % (ref 0–0.5)
KETONES UR QL STRIP: NEGATIVE
LEUKOCYTE ESTERASE UR QL STRIP.AUTO: NEGATIVE
LYMPHOCYTES # BLD AUTO: 2.25 10*3/MM3 (ref 0.7–3.1)
LYMPHOCYTES NFR BLD AUTO: 24.3 % (ref 19.6–45.3)
MAGNESIUM SERPL-MCNC: 2 MG/DL (ref 1.6–2.4)
MCH RBC QN AUTO: 28.4 PG (ref 26.6–33)
MCHC RBC AUTO-ENTMCNC: 32.4 G/DL (ref 31.5–35.7)
MCV RBC AUTO: 87.9 FL (ref 79–97)
MONOCYTES # BLD AUTO: 0.64 10*3/MM3 (ref 0.1–0.9)
MONOCYTES NFR BLD AUTO: 6.9 % (ref 5–12)
NEUTROPHILS NFR BLD AUTO: 5.97 10*3/MM3 (ref 1.7–7)
NEUTROPHILS NFR BLD AUTO: 64.4 % (ref 42.7–76)
NITRITE UR QL STRIP: NEGATIVE
NRBC BLD AUTO-RTO: 0 /100 WBC (ref 0–0.2)
PH UR STRIP.AUTO: 6 [PH] (ref 5–9)
PLATELET # BLD AUTO: 401 10*3/MM3 (ref 140–450)
PMV BLD AUTO: 9.2 FL (ref 6–12)
POTASSIUM SERPL-SCNC: 3.5 MMOL/L (ref 3.5–5.2)
PROT UR QL STRIP: NEGATIVE
QT INTERVAL: 412 MS
QTC INTERVAL: 428 MS
RBC # BLD AUTO: 4.64 10*6/MM3 (ref 3.77–5.28)
SODIUM SERPL-SCNC: 139 MMOL/L (ref 136–145)
SP GR UR STRIP: 1.02 (ref 1–1.03)
UROBILINOGEN UR QL STRIP: NORMAL
WBC NRBC COR # BLD: 9.27 10*3/MM3 (ref 3.4–10.8)

## 2023-01-08 PROCEDURE — 63710000001 INSULIN ASPART PER 5 UNITS: Performed by: PHYSICIAN ASSISTANT

## 2023-01-08 PROCEDURE — 25010000002 IOPAMIDOL 61 % SOLUTION: Performed by: FAMILY MEDICINE

## 2023-01-08 PROCEDURE — 83735 ASSAY OF MAGNESIUM: CPT | Performed by: PHYSICIAN ASSISTANT

## 2023-01-08 PROCEDURE — G0378 HOSPITAL OBSERVATION PER HR: HCPCS

## 2023-01-08 PROCEDURE — 82962 GLUCOSE BLOOD TEST: CPT

## 2023-01-08 PROCEDURE — 96372 THER/PROPH/DIAG INJ SC/IM: CPT

## 2023-01-08 PROCEDURE — 85025 COMPLETE CBC W/AUTO DIFF WBC: CPT | Performed by: PHYSICIAN ASSISTANT

## 2023-01-08 PROCEDURE — 25010000002 HYDROMORPHONE 1 MG/ML SOLUTION: Performed by: INTERNAL MEDICINE

## 2023-01-08 PROCEDURE — 74177 CT ABD & PELVIS W/CONTRAST: CPT

## 2023-01-08 PROCEDURE — 80048 BASIC METABOLIC PNL TOTAL CA: CPT | Performed by: PHYSICIAN ASSISTANT

## 2023-01-08 PROCEDURE — 81003 URINALYSIS AUTO W/O SCOPE: CPT | Performed by: PHYSICIAN ASSISTANT

## 2023-01-08 PROCEDURE — 96361 HYDRATE IV INFUSION ADD-ON: CPT

## 2023-01-08 PROCEDURE — 25010000002 HEPARIN (PORCINE) PER 1000 UNITS: Performed by: PHYSICIAN ASSISTANT

## 2023-01-08 PROCEDURE — 63710000001 ONDANSETRON PER 8 MG: Performed by: PHYSICIAN ASSISTANT

## 2023-01-08 PROCEDURE — 63710000001 INSULIN DETEMIR PER 5 UNITS: Performed by: PHYSICIAN ASSISTANT

## 2023-01-08 RX ORDER — SODIUM CHLORIDE 9 MG/ML
75 INJECTION, SOLUTION INTRAVENOUS CONTINUOUS
Status: DISPENSED | OUTPATIENT
Start: 2023-01-08 | End: 2023-01-08

## 2023-01-08 RX ORDER — HYDROCODONE BITARTRATE AND ACETAMINOPHEN 7.5; 325 MG/1; MG/1
1 TABLET ORAL EVERY 6 HOURS PRN
Status: DISCONTINUED | OUTPATIENT
Start: 2023-01-08 | End: 2023-01-11 | Stop reason: HOSPADM

## 2023-01-08 RX ADMIN — MELOXICAM 15 MG: 15 TABLET ORAL at 08:21

## 2023-01-08 RX ADMIN — FOLIC ACID 1000 MCG: 1 TABLET ORAL at 08:20

## 2023-01-08 RX ADMIN — HEPARIN SODIUM 5000 UNITS: 5000 INJECTION INTRAVENOUS; SUBCUTANEOUS at 21:44

## 2023-01-08 RX ADMIN — SODIUM CHLORIDE 500 ML: 9 INJECTION, SOLUTION INTRAVENOUS at 09:29

## 2023-01-08 RX ADMIN — HEPARIN SODIUM 5000 UNITS: 5000 INJECTION INTRAVENOUS; SUBCUTANEOUS at 15:54

## 2023-01-08 RX ADMIN — RANOLAZINE 500 MG: 500 TABLET, FILM COATED, EXTENDED RELEASE ORAL at 21:44

## 2023-01-08 RX ADMIN — HYDROCODONE BITARTRATE AND ACETAMINOPHEN 1 TABLET: 7.5; 325 TABLET ORAL at 14:57

## 2023-01-08 RX ADMIN — SODIUM CHLORIDE 75 ML/HR: 9 INJECTION, SOLUTION INTRAVENOUS at 09:30

## 2023-01-08 RX ADMIN — INSULIN DETEMIR 35 UNITS: 100 INJECTION, SOLUTION SUBCUTANEOUS at 21:44

## 2023-01-08 RX ADMIN — CLOPIDOGREL BISULFATE 75 MG: 75 TABLET ORAL at 08:23

## 2023-01-08 RX ADMIN — Medication 10 ML: at 21:45

## 2023-01-08 RX ADMIN — PANTOPRAZOLE SODIUM 40 MG: 40 TABLET, DELAYED RELEASE ORAL at 06:13

## 2023-01-08 RX ADMIN — HYDROCODONE BITARTRATE AND ACETAMINOPHEN 1 TABLET: 7.5; 325 TABLET ORAL at 21:44

## 2023-01-08 RX ADMIN — VENLAFAXINE HYDROCHLORIDE 75 MG: 75 CAPSULE, EXTENDED RELEASE ORAL at 08:20

## 2023-01-08 RX ADMIN — ATORVASTATIN CALCIUM 80 MG: 40 TABLET, FILM COATED ORAL at 10:50

## 2023-01-08 RX ADMIN — ASPIRIN 325 MG: 325 TABLET, COATED ORAL at 08:21

## 2023-01-08 RX ADMIN — HEPARIN SODIUM 5000 UNITS: 5000 INJECTION INTRAVENOUS; SUBCUTANEOUS at 06:13

## 2023-01-08 RX ADMIN — HYDROMORPHONE HYDROCHLORIDE 0.25 MG: 1 INJECTION, SOLUTION INTRAMUSCULAR; INTRAVENOUS; SUBCUTANEOUS at 16:25

## 2023-01-08 RX ADMIN — INSULIN DETEMIR 40 UNITS: 100 INJECTION, SOLUTION SUBCUTANEOUS at 10:53

## 2023-01-08 RX ADMIN — HYDROMORPHONE HYDROCHLORIDE 0.25 MG: 1 INJECTION, SOLUTION INTRAMUSCULAR; INTRAVENOUS; SUBCUTANEOUS at 10:50

## 2023-01-08 RX ADMIN — IOPAMIDOL 90 ML: 612 INJECTION, SOLUTION INTRAVENOUS at 11:35

## 2023-01-08 RX ADMIN — LEVOTHYROXINE SODIUM 25 MCG: 25 TABLET ORAL at 06:13

## 2023-01-08 RX ADMIN — INSULIN ASPART 3 UNITS: 100 INJECTION, SOLUTION INTRAVENOUS; SUBCUTANEOUS at 11:10

## 2023-01-08 RX ADMIN — RANOLAZINE 500 MG: 500 TABLET, FILM COATED, EXTENDED RELEASE ORAL at 08:20

## 2023-01-08 RX ADMIN — ARIPIPRAZOLE 5 MG: 5 TABLET ORAL at 10:50

## 2023-01-08 RX ADMIN — ONDANSETRON HYDROCHLORIDE 8 MG: 4 TABLET, FILM COATED ORAL at 06:13

## 2023-01-08 RX ADMIN — ACETAMINOPHEN 650 MG: 325 TABLET ORAL at 06:13

## 2023-01-08 NOTE — PLAN OF CARE
Problem: Fall Injury Risk  Goal: Absence of Fall and Fall-Related Injury  Intervention: Promote Injury-Free Environment  Recent Flowsheet Documentation  Taken 1/7/2023 2110 by Ping Manuel RN  Safety Promotion/Fall Prevention: safety round/check completed   Goal Outcome Evaluation:

## 2023-01-08 NOTE — SIGNIFICANT NOTE
01/08/23 1017   OTHER   Discipline occupational therapist;physical therapist   Rehab Time/Intention   Session Not Performed other (see comments)  (Attempted OT/PT evaluations. Patient deferring due to pain; per RN patient with low BP. Patient requests f/u tomorrow. Will f/u per patient request.)   Recommendation   PT - Next Appointment 01/09/23   Recommendation   OT - Next Appointment 01/09/23

## 2023-01-08 NOTE — PROGRESS NOTES
[facility] Medicine   INPATIENT PROGRESS NOTE      Patient Name: Ariana Martinez  Date of Admission: 2023  Today's Date: 23  Length of Stay: 0  Primary Care Physician: Dolly Foss APRN    Subjective   Chief Complaint and HPI:  The patient is a 63 y.o. female who presented to the emergency department on 2023 with chief complaint of chest pain.  She described the chest pain as midepigastric, radiating around her right lower rib cage/upper abdomen to her back.  She deniesd associated radiation.  She complained of mild associated nausea but no vomiting.  Also felt short of breath at times.  She was laying in bed when the pain started.  The pain was continuous, did not resolve until she received 1 sublingual nitroglycerin and Dilaudid here in the emergency department.  She has a long history of cardiac issues including history of CABG, history of PCI, CHF.  Most recent left heart cath in 2022 showing the followin.  One-vessel obstructive coronary artery disease involving chronic total occlusion of the LAD.  LIMA to LAD is patent, target vessel is small in caliber with severe diffuse disease but not amenable to PCI  2.  Patent stents in the LAD extending into the diagonal  3.  Mild disease in the RCA and left circumflex which is unchanged from before  4.  Normal left-sided filling pressures     She denied dark or tarry sticky stools, hematemesis, vomiting.     She was evaluated by cardiology who felt that the pain was more likely to be GI related given her history of gastroparesis.    Today the patient states that her pain was persistent through the night, got better after pain medicine but would come back shortly after.  Pain is mainly in her right upper quadrant, radiates to her flank.  She denies hematuria, dysuria.  She does not have a gallbladder.  She denies melena, hematochezia, hematemesis, change in her bowel movements.  Does feel  somewhat similar to her previous gastroparesis episodes.        Review of Systems   Review of Systems as of 01/08/23   Constitutional: Negative.    HENT: Negative.    Eyes: Negative.    Respiratory: Negative.    Cardiovascular: Negative.    Gastrointestinal: Positive for abdominal pain  Endocrine: Negative.    Genitourinary: Negative.    Musculoskeletal: Negative.    Skin: Negative.    Neurological: Negative.    Psychiatric/Behavioral: Negative.      All pertinent negatives and positives are as above. All other systems have been reviewed and are negative unless otherwise stated.     Objective    Temp:  [97 °F (36.1 °C)-98 °F (36.7 °C)] 97 °F (36.1 °C)  Heart Rate:  [55-61] 60  Resp:  [16-18] 18  BP: ()/(51-57) 119/56  Physical Exam    Vitals and nursing note reviewed.   Constitutional:       General: No acute distress.     Appearance: Normal appearance.   HENT:      Head: Normocephalic and atraumatic.      Mouth: Mucous membranes are moist.   Eyes:      Conjunctivae/sclerae: Conjunctivae normal.      Pupils: Pupils are equal, round, and reactive to light.   Cardiovascular:      Rate and Rhythm: Normal rate and regular rhythm.   Pulmonary:      Effort: Pulmonary effort is normal.      Breath sounds: Normal breath sounds.   Abdominal:      General: Abdomen is flat.      Palpations: Abdomen is soft.      Tenderness: Mild midepigastric and right upper quadrant tenderness that rebound or guarding  Musculoskeletal:         General: No signs of injury. Normal range of motion.   Skin:     General: Skin is warm and dry.   Neurological:      General: No focal deficit present.      Mental Status: Alert and oriented  Psychiatric:         Mood and Affect: Mood normal.         Behavior: Behavior normal.         Results Review:  I have reviewed the labs, radiology results, and diagnostic studies.    Laboratory Data:   Results from last 7 days   Lab Units 01/08/23  0607 01/07/23  0653 01/06/23  1051   WBC 10*3/mm3 9.27 9.28  12.39*   HEMOGLOBIN g/dL 13.2 12.2 12.9   HEMATOCRIT % 40.8 37.5 39.4   PLATELETS 10*3/mm3 401 373 421        Results from last 7 days   Lab Units 01/08/23  0607 01/07/23  0653 01/06/23  1051   SODIUM mmol/L 139 139 139   POTASSIUM mmol/L 3.5 3.6 3.6   CHLORIDE mmol/L 100 101 100   CO2 mmol/L 28.0 28.0 30.0*   BUN mg/dL 14 12 13   CREATININE mg/dL 1.10* 0.97 1.02*   CALCIUM mg/dL 9.4 9.2 9.6   BILIRUBIN mg/dL  --   --  0.3   ALK PHOS U/L  --   --  120*   ALT (SGPT) U/L  --   --  18   AST (SGOT) U/L  --   --  14   GLUCOSE mg/dL 111* 212* 204*       Culture Data:   No results found for: BLOODCX  No results found for: URINECX  No results found for: RESPCX  No results found for: WOUNDCX  No results found for: STOOLCX  No components found for: BODYFLD    Radiology Data:   Imaging Results (Last 24 Hours)     Procedure Component Value Units Date/Time    CT Abdomen Pelvis With Contrast [[id number]] Resulted: 01/08/23 1133     Updated: 01/08/23 1135          I have reviewed the patient's current medications.     Assessment/Plan     .[email or web address]    Active Hospital Problems    Diagnosis    • Chest pain, unspecified type    • Gastroparesis        Chest pain, history of CABG and PCI  -Somewhat atypical chest pain to midepigastric/right upper quadrant/right lower chest wall pain, resolved with 1 nitroglycerin  - Long history of coronary artery disease with history of CABG and PCI  - Sees Dr. Snowden outpatient, will follow closely outpatient     Abdominal pain  - Continued pain right upper quadrant/right flank.  - UA in process  - CT abdomen in process  - Consult GI as needed    Peptic ulcer disease, gastroparesis  -Protonix     Type 2 diabetes  -Sliding scale     Hypertension  Hyperlipidemia  Anxiety  Depression  GERD  Obesity  Ménière's    Discharge Planning: I expect the patient to be discharged in 1 to 2 days      Copied text in this note has been reviewed and is accurate as of 1/8/2023       Electronically signed by Marleny  TAYLOR Montoya, 01/08/23, 14:58 CST.

## 2023-01-08 NOTE — PLAN OF CARE
Goal Outcome Evaluation:      Bp on the soft side, bp medications held this morning. Bolus given and Iv fluids started. Pt c/o pain and states that the ordered oral pain medication does not help her pain. Pt states that only the iv pain medication is able to dull the pain and makes the pain tolerable. Pt had a poor appetite this shift. No acute events.

## 2023-01-09 LAB
ANION GAP SERPL CALCULATED.3IONS-SCNC: 6 MMOL/L (ref 5–15)
BASOPHILS # BLD AUTO: 0.04 10*3/MM3 (ref 0–0.2)
BASOPHILS NFR BLD AUTO: 0.6 % (ref 0–1.5)
BUN SERPL-MCNC: 10 MG/DL (ref 8–23)
BUN/CREAT SERPL: 10.1 (ref 7–25)
CALCIUM SPEC-SCNC: 9.1 MG/DL (ref 8.6–10.5)
CHLORIDE SERPL-SCNC: 101 MMOL/L (ref 98–107)
CO2 SERPL-SCNC: 29 MMOL/L (ref 22–29)
CREAT SERPL-MCNC: 0.99 MG/DL (ref 0.57–1)
DEPRECATED RDW RBC AUTO: 42.3 FL (ref 37–54)
EGFRCR SERPLBLD CKD-EPI 2021: 65 ML/MIN/1.73
EOSINOPHIL # BLD AUTO: 0.24 10*3/MM3 (ref 0–0.4)
EOSINOPHIL NFR BLD AUTO: 3.3 % (ref 0.3–6.2)
ERYTHROCYTE [DISTWIDTH] IN BLOOD BY AUTOMATED COUNT: 13.4 % (ref 12.3–15.4)
GLUCOSE BLDC GLUCOMTR-MCNC: 103 MG/DL (ref 70–130)
GLUCOSE BLDC GLUCOMTR-MCNC: 106 MG/DL (ref 70–130)
GLUCOSE BLDC GLUCOMTR-MCNC: 117 MG/DL (ref 70–130)
GLUCOSE BLDC GLUCOMTR-MCNC: 96 MG/DL (ref 70–130)
GLUCOSE SERPL-MCNC: 106 MG/DL (ref 65–99)
HCT VFR BLD AUTO: 41.1 % (ref 34–46.6)
HGB BLD-MCNC: 13.5 G/DL (ref 12–15.9)
IMM GRANULOCYTES # BLD AUTO: 0.05 10*3/MM3 (ref 0–0.05)
IMM GRANULOCYTES NFR BLD AUTO: 0.7 % (ref 0–0.5)
LYMPHOCYTES # BLD AUTO: 2.07 10*3/MM3 (ref 0.7–3.1)
LYMPHOCYTES NFR BLD AUTO: 28.6 % (ref 19.6–45.3)
MAGNESIUM SERPL-MCNC: 2.1 MG/DL (ref 1.6–2.4)
MCH RBC QN AUTO: 28.7 PG (ref 26.6–33)
MCHC RBC AUTO-ENTMCNC: 32.8 G/DL (ref 31.5–35.7)
MCV RBC AUTO: 87.4 FL (ref 79–97)
MONOCYTES # BLD AUTO: 0.57 10*3/MM3 (ref 0.1–0.9)
MONOCYTES NFR BLD AUTO: 7.9 % (ref 5–12)
NEUTROPHILS NFR BLD AUTO: 4.27 10*3/MM3 (ref 1.7–7)
NEUTROPHILS NFR BLD AUTO: 58.9 % (ref 42.7–76)
NRBC BLD AUTO-RTO: 0 /100 WBC (ref 0–0.2)
PLATELET # BLD AUTO: 279 10*3/MM3 (ref 140–450)
PMV BLD AUTO: 10.4 FL (ref 6–12)
POTASSIUM SERPL-SCNC: 3.9 MMOL/L (ref 3.5–5.2)
RBC # BLD AUTO: 4.7 10*6/MM3 (ref 3.77–5.28)
RBC MORPH BLD: NORMAL
SMALL PLATELETS BLD QL SMEAR: ADEQUATE
SODIUM SERPL-SCNC: 136 MMOL/L (ref 136–145)
WBC MORPH BLD: NORMAL
WBC NRBC COR # BLD: 7.24 10*3/MM3 (ref 3.4–10.8)

## 2023-01-09 PROCEDURE — 80048 BASIC METABOLIC PNL TOTAL CA: CPT | Performed by: PHYSICIAN ASSISTANT

## 2023-01-09 PROCEDURE — 85025 COMPLETE CBC W/AUTO DIFF WBC: CPT | Performed by: PHYSICIAN ASSISTANT

## 2023-01-09 PROCEDURE — 97162 PT EVAL MOD COMPLEX 30 MIN: CPT

## 2023-01-09 PROCEDURE — 63710000001 INSULIN DETEMIR PER 5 UNITS: Performed by: PHYSICIAN ASSISTANT

## 2023-01-09 PROCEDURE — 83735 ASSAY OF MAGNESIUM: CPT | Performed by: PHYSICIAN ASSISTANT

## 2023-01-09 PROCEDURE — 96372 THER/PROPH/DIAG INJ SC/IM: CPT

## 2023-01-09 PROCEDURE — 25010000002 HEPARIN (PORCINE) PER 1000 UNITS: Performed by: PHYSICIAN ASSISTANT

## 2023-01-09 PROCEDURE — 97166 OT EVAL MOD COMPLEX 45 MIN: CPT

## 2023-01-09 PROCEDURE — 25010000002 HYDROMORPHONE 1 MG/ML SOLUTION: Performed by: INTERNAL MEDICINE

## 2023-01-09 PROCEDURE — 99214 OFFICE O/P EST MOD 30 MIN: CPT | Performed by: INTERNAL MEDICINE

## 2023-01-09 PROCEDURE — 82962 GLUCOSE BLOOD TEST: CPT

## 2023-01-09 PROCEDURE — 63710000001 ONDANSETRON PER 8 MG: Performed by: PHYSICIAN ASSISTANT

## 2023-01-09 PROCEDURE — G0378 HOSPITAL OBSERVATION PER HR: HCPCS

## 2023-01-09 PROCEDURE — 85007 BL SMEAR W/DIFF WBC COUNT: CPT | Performed by: PHYSICIAN ASSISTANT

## 2023-01-09 RX ADMIN — Medication 10 ML: at 09:00

## 2023-01-09 RX ADMIN — FUROSEMIDE 40 MG: 40 TABLET ORAL at 08:34

## 2023-01-09 RX ADMIN — RANOLAZINE 500 MG: 500 TABLET, FILM COATED, EXTENDED RELEASE ORAL at 08:33

## 2023-01-09 RX ADMIN — HYDROMORPHONE HYDROCHLORIDE 0.25 MG: 1 INJECTION, SOLUTION INTRAMUSCULAR; INTRAVENOUS; SUBCUTANEOUS at 18:14

## 2023-01-09 RX ADMIN — HYDROCHLOROTHIAZIDE 12.5 MG: 12.5 TABLET ORAL at 08:34

## 2023-01-09 RX ADMIN — HYDROMORPHONE HYDROCHLORIDE 0.25 MG: 1 INJECTION, SOLUTION INTRAMUSCULAR; INTRAVENOUS; SUBCUTANEOUS at 23:01

## 2023-01-09 RX ADMIN — VENLAFAXINE HYDROCHLORIDE 75 MG: 75 CAPSULE, EXTENDED RELEASE ORAL at 08:33

## 2023-01-09 RX ADMIN — INSULIN DETEMIR 40 UNITS: 100 INJECTION, SOLUTION SUBCUTANEOUS at 08:35

## 2023-01-09 RX ADMIN — ATORVASTATIN CALCIUM 80 MG: 40 TABLET, FILM COATED ORAL at 08:32

## 2023-01-09 RX ADMIN — ISOSORBIDE MONONITRATE 30 MG: 30 TABLET, EXTENDED RELEASE ORAL at 08:32

## 2023-01-09 RX ADMIN — PANTOPRAZOLE SODIUM 40 MG: 40 TABLET, DELAYED RELEASE ORAL at 05:36

## 2023-01-09 RX ADMIN — HYDROMORPHONE HYDROCHLORIDE 0.25 MG: 1 INJECTION, SOLUTION INTRAMUSCULAR; INTRAVENOUS; SUBCUTANEOUS at 09:13

## 2023-01-09 RX ADMIN — HYDROMORPHONE HYDROCHLORIDE 0.25 MG: 1 INJECTION, SOLUTION INTRAMUSCULAR; INTRAVENOUS; SUBCUTANEOUS at 03:13

## 2023-01-09 RX ADMIN — HEPARIN SODIUM 5000 UNITS: 5000 INJECTION INTRAVENOUS; SUBCUTANEOUS at 05:36

## 2023-01-09 RX ADMIN — HYDROCODONE BITARTRATE AND ACETAMINOPHEN 1 TABLET: 7.5; 325 TABLET ORAL at 05:36

## 2023-01-09 RX ADMIN — Medication 10 ML: at 20:24

## 2023-01-09 RX ADMIN — HEPARIN SODIUM 5000 UNITS: 5000 INJECTION INTRAVENOUS; SUBCUTANEOUS at 13:57

## 2023-01-09 RX ADMIN — RANOLAZINE 500 MG: 500 TABLET, FILM COATED, EXTENDED RELEASE ORAL at 20:23

## 2023-01-09 RX ADMIN — ONDANSETRON HYDROCHLORIDE 8 MG: 4 TABLET, FILM COATED ORAL at 11:42

## 2023-01-09 RX ADMIN — MELOXICAM 15 MG: 15 TABLET ORAL at 08:34

## 2023-01-09 RX ADMIN — ONDANSETRON HYDROCHLORIDE 8 MG: 4 TABLET, FILM COATED ORAL at 23:01

## 2023-01-09 RX ADMIN — HEPARIN SODIUM 5000 UNITS: 5000 INJECTION INTRAVENOUS; SUBCUTANEOUS at 21:01

## 2023-01-09 RX ADMIN — LEVOTHYROXINE SODIUM 25 MCG: 25 TABLET ORAL at 05:36

## 2023-01-09 RX ADMIN — INSULIN DETEMIR 35 UNITS: 100 INJECTION, SOLUTION SUBCUTANEOUS at 20:24

## 2023-01-09 RX ADMIN — HYDROCODONE BITARTRATE AND ACETAMINOPHEN 1 TABLET: 7.5; 325 TABLET ORAL at 14:41

## 2023-01-09 RX ADMIN — FOLIC ACID 1000 MCG: 1 TABLET ORAL at 08:33

## 2023-01-09 RX ADMIN — ASPIRIN 325 MG: 325 TABLET, COATED ORAL at 08:32

## 2023-01-09 RX ADMIN — CLOPIDOGREL BISULFATE 75 MG: 75 TABLET ORAL at 08:34

## 2023-01-09 RX ADMIN — ARIPIPRAZOLE 5 MG: 5 TABLET ORAL at 08:32

## 2023-01-09 NOTE — CONSULTS
SUBJECTIVE:   1/9/2023    Name: Ariana Martinez  DOD: 1960    REASON FOR CONSULT: Chest pain    Chief Complaint:     Chief Complaint   Patient presents with   • Chest Pain       Subjective     Patient is 63 y.o. female with past medical history of bacterial endocarditis, angina, anxiety, depression, benign paroxysmal positional vertigo, coronary atherosclerosis, diabetes mellitus, diabetic polyneuropathy, thyroid disorder, hypercholesterolemia, hyperlipidemia, hypertension, GERD, gastroparesis, esophageal ulcer presents with chest pain.  Cardiac evaluation was unremarkable so far.  Her last EGD last year was consistent with esophageal ulcer.  She is complaining of mid chest pain and midepigastric pain radiating to the back.  He has nausea and denied emesis.  Also no diarrhea constipation, rectal bleeding or weight loss.  Taking PPI daily.  Denied NSAID usage.     ROS/HISTORY/ CURRENT MEDICATIONS/OBJECTIVE/VS/PE:   Review of Systems:   Review of Systems   Constitutional: Negative for chills, fatigue, fever and unexpected weight change.   HENT: Negative for congestion, ear discharge, hearing loss, nosebleeds and sore throat.    Eyes: Negative for pain, discharge and redness.   Respiratory: Negative for cough, chest tightness, shortness of breath and wheezing.    Cardiovascular: Positive for chest pain. Negative for palpitations.   Gastrointestinal: Positive for abdominal pain and nausea. Negative for abdominal distention, blood in stool, constipation, diarrhea and vomiting.   Endocrine: Negative for cold intolerance, polydipsia, polyphagia and polyuria.   Genitourinary: Negative for dysuria, flank pain, frequency, hematuria and urgency.   Musculoskeletal: Negative for arthralgias, back pain, joint swelling and myalgias.   Skin: Negative for color change, pallor and rash.   Neurological: Negative for tremors, seizures, syncope, weakness and headaches.   Hematological: Negative for adenopathy. Does not  bruise/bleed easily.   Psychiatric/Behavioral: Negative for behavioral problems, confusion, dysphoric mood, hallucinations and suicidal ideas. The patient is not nervous/anxious.        History:     Past Medical History:   Diagnosis Date   • Acute bacterial endocarditis 3/13/2021   • Angina, class IV ([facility])    • Anxiety    • Anxiety and depression    • Arthritis    • Benign paroxysmal positional vertigo    • Bladder disorder     has bladder stimulator   • Carpal tunnel syndrome    • CHF (congestive heart failure) ([facility])    • Chronic pain    • Coronary atherosclerosis     hx CABG 2005.  is followed by Dr Kwon   • Depression    • Diabetes mellitus ([facility])     Type 2, controlled   • Diabetic polyneuropathy ([facility])    • Disease of thyroid gland    • Elevated cholesterol    • Female stress incontinence    • Foot pain, left    • Full dentures    • Gastroparesis    • GERD (gastroesophageal reflux disease)    • Hyperlipidemia    • Hypertension    • Low back pain    • Malaise and fatigue    • Multiple joint pain    • Obesity     Refuses to be weighed   • Occlusion and stenosis of bilateral carotid arteries 6/18/2021   • Otalgia     Both   • Perforation of tympanic membrane     Left   • Postoperative wound infection    • Vitamin D deficiency    • Wears glasses     reading     Past Surgical History:   Procedure Laterality Date   • ABDOMINAL SURGERY     • AMPUTATION FOOT     • ANGIOPLASTY      coronary   • ANKLE ARTHROSCOPY Left 2/26/2021    Procedure: Left foot hardware removal, ankle arthroscopy, bone grafting , left foot exostectomy;  Surgeon: Ignacio Lord DPM;  Location: [facility] OR;  Service: Podiatry;  Laterality: Left;   • BREAST BIOPSY Right    • CARDIAC CATHETERIZATION     • CARDIAC CATHETERIZATION N/A 6/20/2017    Procedure: Right Heart Cath;  Surgeon: Can Kwon MD PhD;  Location: [facility] CATH INVASIVE LOCATION;  Service:    • CARDIAC CATHETERIZATION N/A 2/18/2020    Procedure: Left Heart Cath;   Surgeon: Catalina Cooper MD;  Location: [facility] CATH INVASIVE LOCATION;  Service: Cardiology;  Laterality: N/A;   • CARDIAC CATHETERIZATION N/A 4/6/2022    Procedure: Left Heart Cath;  Surgeon: Sheryl Navas MD;  Location: [facility] CATH INVASIVE LOCATION;  Service: Cardiology;  Laterality: N/A;   • CARPAL TUNNEL RELEASE     • CHOLECYSTECTOMY     • COLONOSCOPY N/A 6/24/2020    Procedure: COLONOSCOPY;  Surgeon: Julián Maldonado MD;  Location: [facility] ENDOSCOPY;  Service: Gastroenterology;  Laterality: N/A;   • CORONARY ARTERY BYPASS GRAFT  2005   • ENDOSCOPY N/A 10/19/2018    Procedure: ESOPHAGOGASTRODUODENOSCOPY possible dilation;  Surgeon: Julián Maldonado MD;  Location: [facility] ENDOSCOPY;  Service: Gastroenterology   • ENDOSCOPY N/A 6/24/2020    Procedure: ESOPHAGOGASTRODUODENOSCOPY WED appt please;  Surgeon: Julián Maldonado MD;  Location: [facility] ENDOSCOPY;  Service: Gastroenterology;  Laterality: N/A;   • ENDOSCOPY N/A 6/10/2022    Procedure: ESOPHAGOGASTRODUODENOSCOPY   room 380;  Surgeon: Jeremiah Wilkins MD;  Location: [facility] ENDOSCOPY;  Service: Gastroenterology;  Laterality: N/A;   • ENDOSCOPY AND COLONOSCOPY     • FOOT SURGERY      Toes   • FOOT SURGERY     • GASTRIC BANDING      Revision, laparoscopic   • HYSTERECTOMY     • INCISION AND DRAINAGE LEG Left 3/12/2021    Procedure: Left ankle arthroscopic irrigation and debridement, screw removal;  Surgeon: Ignacio Lord DPM;  Location: [facility] OR;  Service: Podiatry;  Laterality: Left;   • MOUTH SURGERY     • SALPINGO OOPHORECTOMY     • SHOULDER SURGERY     • SUBTALAR ARTHRODESIS Left 1/16/2019    Procedure: LEFT FOOT HARDWARE REMOVAL, FIFTH METATARSAL , OPEN REDUCTION INTERNAL FIXATION, CALCANEAL OSTEOTOMY;  Surgeon: Ignacio Lord DPM;  Location: [facility] OR;  Service: Podiatry   • SUBTALAR ARTHRODESIS Left 10/16/2019    Procedure: foot hardware removal, subtalar joint fusion  possible de/reattachment of achilles tendon        (c-arm);  Surgeon:  Ignacio Lord DPM;  Location: [facility];  Service: Podiatry   • SUBTALAR ARTHRODESIS Left 9/30/2020    Procedure: subtalar, talonavicular joint arthrodesis.  Removal hardware.          (c-arm);  Surgeon: Ignacio Lord DPM;  Location: [facility];  Service: Podiatry;  Laterality: Left;   • TRANSESOPHAGEAL ECHOCARDIOGRAM (LAMONTE)      With color flow     Family History   Problem Relation Age of Onset   • Diabetes Other    • Heart disease Other    • Hypertension Other    • Heart disease Mother    • Stroke Mother    • Hypertension Mother    • Diabetes Sister    • Heart disease Sister    • Hypertension Sister    • Heart disease Sister    • Diabetes Sister    • Hypertension Sister    • Diabetes Sister    • Diabetes Sister    • Diabetes Sister    • Diabetes Sister      Social History     Tobacco Use   • Smoking status: Never   • Smokeless tobacco: Never   Vaping Use   • Vaping Use: Never used   Substance Use Topics   • Alcohol use: No   • Drug use: No     Medications Prior to Admission   Medication Sig Dispense Refill Last Dose   • amLODIPine (NORVASC) 5 MG tablet Take 1 tablet by mouth Daily. 90 tablet 1 1/6/2023   • ARIPiprazole (ABILIFY) 5 MG tablet TAKE 1 TABLET BY MOUTH EVERY DAY 90 tablet 0 1/6/2023   • aspirin  MG tablet Take 1 tablet by mouth Daily. 30 tablet 0 1/6/2023   • atorvastatin (LIPITOR) 80 MG tablet Take 1 tablet by mouth Daily. 90 tablet 1 1/6/2023   • B-D ULTRAFINE III SHORT PEN 31G X 8 MM misc USE TO INJECT 5 TIMES A  each 11 1/6/2023   • B-D ULTRAFINE III SHORT PEN 31G X 8 MM misc USE UP TO 4 (FOUR) TIMES DAILY WITH INSULIN AS DIRECTED. 200 each 1 1/6/2023   • BD SHARPS CONTAINER HOME misc 1 each Take As Directed. 1 each 0 1/6/2023   • Blood Glucose Monitoring Suppl (ONE TOUCH ULTRA MINI) w/Device kit Patient will need the Accu-Check Avitio meter 1 each 0 1/6/2023   • butalbital-acetaminophen-caffeine (FIORICET, ESGIC) -40 MG per tablet Take one to two tablets by mouth per  headache episode as needed. 6 tablet 0 Past Month   • Calcium Citrate-Vitamin D 250-200 MG-UNIT tablet Take 2 tablets by mouth 2 (two) times a day.   Past Week   • clopidogrel (PLAVIX) 75 MG tablet Take 1 tablet by mouth Daily. 90 tablet 0 1/6/2023   • [facility] Diclofenac Sodium 1 % gel gel APPLY 4 G TOPICALLY TO THE APPROPRIATE AREA AS DIRECTED 2 (TWO) TIMES A DAY. SMALL AMOUNT TO AFFECTED AREA 100 g 0 1/6/2023   • folic acid (FOLVITE) 1 MG tablet TAKE 1 TABLET BY MOUTH EVERY DAY 90 tablet 1 1/6/2023   • furosemide (LASIX) 40 MG tablet TAKE 1 TABLET BY MOUTH EVERY DAY 30 tablet 0 1/6/2023   • gabapentin (NEURONTIN) 100 MG capsule Take 1 capsule by mouth Every 12 (Twelve) Hours. 60 capsule 0 1/6/2023   • glucose blood (Accu-Chek Guide) test strip 1 each by Other route 4 (Four) Times a Day. To test blood sugar 4X daily. For  Dx E11.65 600 each 1 1/6/2023   • glucose monitor monitoring kit 1 each Daily. accucheck eve meter, E11.9 1 each 0 1/6/2023   • hydroCHLOROthiazide (HYDRODIURIL) 12.5 MG tablet TAKE 1 TABLET BY MOUTH EVERY DAY 30 tablet 1 1/6/2023   • HYDROcodone-acetaminophen (NORCO) 7.5-325 MG per tablet Take 1 tablet by mouth Every 6 (Six) Hours As Needed for Moderate Pain. 120 tablet 0 1/5/2023   • insulin aspart (NovoLOG FlexPen) 100 UNIT/ML solution pen-injector sc pen Inject up to 45 units  3 TIMES A DAY BEFORE MEALS 90 mL 11 1/6/2023   • Insulin Glargine (BASAGLAR KWIKPEN) 100 UNIT/ML injection pen Inject 40 units into the appropriate area every morning and 35 units into the appropriate area every night 30 mL 4 1/6/2023   • isosorbide mononitrate (IMDUR) 30 MG 24 hr tablet TAKE 1 TABLET BY MOUTH EVERY DAY 30 tablet 0 1/6/2023   • Lancets (accu-chek soft touch) lancets As directed 100 each 12 1/6/2023   • levothyroxine (SYNTHROID, LEVOTHROID) 25 MCG tablet Take 1 tablet by mouth Daily. 30 tablet 11 1/5/2023   • meloxicam (MOBIC) 15 MG tablet TAKE 1 TABLET BY MOUTH EVERY DAY WITH FOOD 30 tablet 3 1/6/2023   •  "methocarbamol (ROBAXIN) 500 MG tablet TAKE 1 TABLET BY MOUTH 2 TIMES A DAY AS NEEDED FOR MUSCLE SPASMS. 60 tablet 5 Past Month   • metoclopramide (REGLAN) 10 MG tablet TAKE 1 TABLET BY MOUTH 4 (FOUR) TIMES A DAY AS NEEDED (NAUSEA). 120 tablet 0 Past Week   • metoclopramide (REGLAN) 5 MG tablet Take 1 tablet by mouth 3 (Three) Times a Day As Needed (vomiting). 12 tablet 0 Past Week   • metoprolol succinate XL (TOPROL-XL) 50 MG 24 hr tablet Take 1 tablet by mouth Daily. Dose increased 5/24/21 90 tablet 1 1/6/2023   • Needle, Disp, (Easy Touch FlipLock Needles) 25G X 1-1/2\" misc 1 each Every 28 (Twenty-Eight) Days. For administering B12 cyanocobalomin. Dx vitamin B12 deficiency. 10 each 3 1/6/2023   • Needle, Disp, (Hypodermic Needle) 20G X 1\" misc 1 each Every 28 (Twenty-Eight) Days. For drawing up B12 for injection monthly, change back to smaller gauge needle prior to injection. Dx Vitamin B12  Deficiency. 10 each 2 1/6/2023   • ondansetron (ZOFRAN) 8 MG tablet Take 1 tablet by mouth Every 8 (Eight) Hours As Needed for Nausea or Vomiting. 18 tablet 1 1/5/2023   • ondansetron ODT (ZOFRAN-ODT) 4 MG disintegrating tablet Place 1 tablet on the tongue 4 (Four) Times a Day As Needed for Nausea or Vomiting. 12 tablet 0 1/5/2023   • pantoprazole (PROTONIX) 20 MG EC tablet Take 2 tablets by mouth Daily. 90 tablet 3 1/6/2023   • ranolazine (RANEXA) 500 MG 12 hr tablet Take 1 tablet by mouth Every 12 (Twelve) Hours. 180 tablet 3 1/6/2023   • Syringe 20G X 1-1/2\" 3 ML misc 1 each Every 28 (Twenty-Eight) Days. For injection cyanocobalomin, Dx vit B12 deficiency. 10 each 2 1/6/2023   • venlafaxine XR (EFFEXOR-XR) 75 MG 24 hr capsule TAKE 1 CAPSULE BY MOUTH DAILY WITH BREAKFAST. 90 capsule 0 1/6/2023   • vitamin D (ERGOCALCIFEROL) 1.25 MG (76204 UT) capsule capsule TAKE 1 CAPSULE BY MOUTH EVERY 7 DAYS. 36 capsule 0 Past Week   • cyanocobalamin 1000 MCG/ML injection Inject 1 mL into the appropriate muscle as directed by prescriber " Every 28 (Twenty-Eight) Days. 1 mL 2 More than a month   • meclizine (ANTIVERT) 25 MG tablet Take 1 tablet by mouth 3 (Three) Times a Day As Needed for dizziness. 90 tablet 6 Unknown   • naloxone (NARCAN) 0.4 MG/ML injection Infuse 0.5 mL into a venous catheter Every 5 (Five) Minutes As Needed for Opioid Reversal.      • nitroglycerin (NITROSTAT) 0.4 MG SL tablet Place 1 tablet under the tongue Every 5 (Five) Minutes As Needed for Chest Pain. Take no more than 3 doses in 15 minutes. 20 tablet 11 Unknown     Allergies:  Seroquel [quetiapine], Avandia [rosiglitazone], Morphine and related, and Oxycodone    I have reviewed the patient's medical history, surgical history and family history in the available medical record system.     Current Medications:     Current Facility-Administered Medications   Medication Dose Route Frequency Provider Last Rate Last Admin   • acetaminophen (TYLENOL) tablet 650 mg  650 mg Oral Q6H PRN Behroozi, Saeid, MD   650 mg at 01/08/23 0613   • amLODIPine (NORVASC) tablet 5 mg  5 mg Oral Daily Marleny Montoya PA-C   5 mg at 01/07/23 0849   • ARIPiprazole (ABILIFY) tablet 5 mg  5 mg Oral Daily Marleny Montoya PA-C   5 mg at 01/09/23 0832   • aspirin EC tablet 325 mg  325 mg Oral Daily Marleny Montoya PA-C   325 mg at 01/09/23 0832   • aspirin tablet 325 mg  325 mg Oral Once Addi Johnson MD       • atorvastatin (LIPITOR) tablet 80 mg  80 mg Oral Daily Marleny Montoya PA-C   80 mg at 01/09/23 0832   • clopidogrel (PLAVIX) tablet 75 mg  75 mg Oral Daily Marleny Montoya PA-C   75 mg at 01/09/23 0834   • cyanocobalamin injection 1,000 mcg  1,000 mcg Intramuscular Q28 Days Marleny Montoya PA-C   1,000 mcg at 01/06/23 2115   • dextrose (D50W) (25 g/50 mL) IV injection 25 g  25 g Intravenous Q15 Min PRN Marleny Montoya PA-C       • dextrose (GLUTOSE) oral gel 15 g  15 g Oral Q15 Min PRN Marleny Montoya PA-C       • folic acid (FOLVITE) tablet 1,000 mcg  1,000 mcg Oral Daily Jan  TAYLOR Farmer   1,000 mcg at 01/09/23 0833   • furosemide (LASIX) tablet 40 mg  40 mg Oral Daily Marleny Montoya PA-C   40 mg at 01/09/23 0834   • glucagon (human recombinant) (GLUCAGEN DIAGNOSTIC) injection 1 mg  1 mg Intramuscular Q15 Min PRN Marleny Montoya PA-C       • heparin (porcine) 5000 UNIT/ML injection 5,000 Units  5,000 Units Subcutaneous Q8H Marleny Montoya PA-C   5,000 Units at 01/09/23 1357   • hydroCHLOROthiazide (HYDRODIURIL) tablet 12.5 mg  12.5 mg Oral Daily Marleny Montoya PA-C   12.5 mg at 01/09/23 0834   • HYDROcodone-acetaminophen (NORCO) 7.5-325 MG per tablet 1 tablet  1 tablet Oral Q6H PRN Marleny Montoya PA-C   1 tablet at 01/09/23 1441   • HYDROmorphone (DILAUDID) injection 0.25 mg  0.25 mg Intravenous Q4H PRN Behroozi, Saeid, MD   0.25 mg at 01/09/23 0913   • Insulin Aspart (novoLOG) injection 0-14 Units  0-14 Units Subcutaneous TID AC Marleny Montoya PA-C   3 Units at 01/08/23 1110   • insulin detemir (LEVEMIR) injection 35 Units  35 Units Subcutaneous Nightly Marleny Montoya PA-C   35 Units at 01/08/23 2144   • insulin detemir (LEVEMIR) injection 40 Units  40 Units Subcutaneous Daily Marleny Montoya PA-C   40 Units at 01/09/23 0835   • isosorbide mononitrate (IMDUR) 24 hr tablet 30 mg  30 mg Oral Daily Marleny Montoya PA-C   30 mg at 01/09/23 0832   • levothyroxine (SYNTHROID, LEVOTHROID) tablet 25 mcg  25 mcg Oral Q AM Marleny Montoya PA-C   25 mcg at 01/09/23 0536   • meclizine (ANTIVERT) tablet 25 mg  25 mg Oral TID PRN Marleny Montoya PA-C       • meloxicam (MOBIC) tablet 15 mg  15 mg Oral Daily Marleny Montoya PA-C   15 mg at 01/09/23 0834   • methocarbamol (ROBAXIN) tablet 500 mg  500 mg Oral BID PRN Marleny Montoya PA-C       • metoprolol succinate XL (TOPROL-XL) 24 hr tablet 50 mg  50 mg Oral Daily Marleny Montoya PA-C   50 mg at 01/07/23 0849   • nitroglycerin (NITROSTAT) SL tablet 0.4 mg  0.4 mg Sublingual Q5 Min PRN Marleny Montoya PA-C       • ondansetron  (ZOFRAN) tablet 8 mg  8 mg Oral Q8H PRN Marleny Montoya PA-C   8 mg at 01/09/23 1142   • pantoprazole (PROTONIX) EC tablet 40 mg  40 mg Oral QAM Marleny Montoya PA-C   40 mg at 01/09/23 0536   • ranolazine (RANEXA) 12 hr tablet 500 mg  500 mg Oral Q12H Marleny Montoya PA-C   500 mg at 01/09/23 0833   • sodium chloride 0.9 % flush 10 mL  10 mL Intravenous PRN Addi Johnson MD       • sodium chloride 0.9 % flush 10 mL  10 mL Intravenous Q12H Marleny Montoya PA-C   10 mL at 01/09/23 0900   • sodium chloride 0.9 % flush 10 mL  10 mL Intravenous PRN Marleny Montoya PA-C       • sodium chloride 0.9 % infusion 40 mL  40 mL Intravenous PRN Marleny Montoya PA-C       • venlafaxine XR (EFFEXOR-XR) 24 hr capsule 75 mg  75 mg Oral Daily With Breakfast Marleny Montoya PA-C   75 mg at 01/09/23 0833       Objective     Physical Exam:   Temp:  [96.8 °F (36 °C)-97.7 °F (36.5 °C)] 96.8 °F (36 °C)  Heart Rate:  [59-69] 69  Resp:  [18] 18  BP: (100-123)/(50-58) 123/58    Physical Exam:  General Appearance:    Alert, cooperative, in no acute distress   Head:    Normocephalic, without obvious abnormality, atraumatic   Eyes:            Lids and lashes normal, conjunctivae and sclerae normal, no   icterus, no pallor, corneas clear, PERRLA   Ears:    Ears appear intact with no abnormalities noted   Throat:   No oral lesions, no thrush, oral mucosa moist   Neck:   No adenopathy, supple, trachea midline, no thyromegaly, no     carotid bruit, no JVD   Back:     No kyphosis present, no scoliosis present, no skin lesions,       erythema or scars, no tenderness to percussion or                   palpation,   range of motion normal   Lungs:     Clear to auscultation,respirations regular, even and                   unlabored    Heart:    Regular rhythm and normal rate, normal S1 and S2, no            murmur, no gallop, no rub, no click   Breast Exam:    Deferred   Abdomen:     Normal bowel sounds, no masses, no organomegaly, soft         nontender, nondistended, no guarding, no rebound                 tenderness   Genitalia:    Deferred   Extremities:   Moves all extremities well, no edema, no cyanosis, no              redness   Pulses:   Pulses palpable and equal bilaterally   Skin:   No bleeding, bruising or rash   Lymph nodes:   No palpable adenopathy   Neurologic:   Cranial nerves 2 - 12 grossly intact, sensation intact, DTR        present and equal bilaterally      Results Review:     Lab Results   Component Value Date    WBC 7.24 01/09/2023    WBC 9.27 01/08/2023    WBC 9.28 01/07/2023    HGB 13.5 01/09/2023    HGB 13.2 01/08/2023    HGB 12.2 01/07/2023    HCT 41.1 01/09/2023    HCT 40.8 01/08/2023    HCT 37.5 01/07/2023     01/09/2023     01/08/2023     01/07/2023     Results from last 7 days   Lab Units 01/06/23  1051   ALK PHOS U/L 120*   ALT (SGPT) U/L 18   AST (SGOT) U/L 14     Results from last 7 days   Lab Units 01/06/23  1051   BILIRUBIN mg/dL 0.3   ALK PHOS U/L 120*     No results found for: LIPASE  Lab Results   Component Value Date    INR 1.05 12/22/2022    INR 0.92 05/31/2022    INR 0.95 02/05/2022         Radiology Review:  Imaging Results (Last 72 Hours)     Procedure Component Value Units Date/Time    CT Abdomen Pelvis With Contrast [[id number]] Collected: 01/08/23 1129     Updated: 01/09/23 0113    Narrative:      EXAM: CT Abdomen and Pelvis with contrast     INDICATION:  Abdominal pain, acute, nonlocalized, R07.9 Chest  pain, unspecified    TECHNIQUE: Helical CT of the abdomen and pelvis was obtained from  the diaphragm through the ischial tuberosities using contrast.  Axial, coronal and sagittal images were obtained.    Dose reduction techniques were used including automated exposure  control and/or adjustment of the mA and/or kV according to  patient size.    COMPARISON: 7/2/2021 CT    FINDINGS:  LUNG BASES: Right hemidiaphragm elevation. Unchanged 3 mm right  middle lobe pulmonary nodule on image  3.    STOMACH: No significant finding.    LIVER: No significant abnormality.     BILIARY: Pneumobilia is slightly increased since 2021.  Cholecystectomy. No biliary ductal dilation.    PANCREAS: No significant abnormality.    SPLEEN: No significant abnormality.    ADRENAL GLANDS: No significant abnormality.    KIDNEYS/BLADDER:  No significant abnormality.    VESSELS: Nonaneurysmal abdominal aorta.    LYMPH NODES: No enlarged abdominal or pelvic lymph nodes.    OTHER PELVIC: No free pelvic fluid.    GI TRACT: No significant abnormality. Appendectomy.    ABDOMINAL WALL: Unchanged bilateral lower quadrant subcutaneous  stranding, likely inconsequential.    PERITONEUM: No ascites or pneumoperitoneum.     BONES/SPINE: No acute abnormality. Sacral stimulator noted. No  evidence of lead discontinuity.        Impression:        Slight increase in pneumobilia since 2021, possibly representing  sphincter of Oddi dysfunction.    No other acute or new findings in the abdomen or pelvis.        Electronically signed by:  Best Wilburn MD  1/9/2023 1:11 AM  CST Workstation: [id number]          I reviewed the patient's new clinical results.    I reviewed the patient's new imaging results and agree with the interpretation.     ASSESSMENT/PLAN:   ASSESSMENT: 1.  Chest pain, cardiac evaluation was unremarkable.  Need to rule out worsening of esophagitis.  2.  Epigastric pain with nausea, likely due to gastroparesis.  Could also be due to Candida esophagitis.  3.  GERD  4.  Gastroparesis    PLAN: 1.  Continue PPI and current supportive care  2.  Proceed with EGD in a.m. for further evaluation  The risks, benefits, and alternatives of this procedure have been discussed with the patient or the responsible party. The patient understands and agrees to proceed.         Jeremiah Wilkins MD  01/09/23  14:52 CST

## 2023-01-09 NOTE — PLAN OF CARE
Problem: Chest Pain  Goal: Resolution of Chest Pain Symptoms  Outcome: Ongoing, Progressing     Problem: Adult Inpatient Plan of Care  Goal: Plan of Care Review  Outcome: Ongoing, Progressing  Goal: Patient-Specific Goal (Individualized)  Outcome: Ongoing, Progressing  Goal: Absence of Hospital-Acquired Illness or Injury  Outcome: Ongoing, Progressing  Intervention: Identify and Manage Fall Risk  Recent Flowsheet Documentation  Taken 1/9/2023 1500 by Cornelia Webster RN  Safety Promotion/Fall Prevention: safety round/check completed  Taken 1/9/2023 1300 by Cornelia Webster RN  Safety Promotion/Fall Prevention: safety round/check completed  Taken 1/9/2023 1100 by Cornelia Webster RN  Safety Promotion/Fall Prevention: safety round/check completed  Taken 1/9/2023 0900 by Cornelia Webster RN  Safety Promotion/Fall Prevention: safety round/check completed  Taken 1/9/2023 0700 by Cornelia Webster RN  Safety Promotion/Fall Prevention: safety round/check completed  Intervention: Prevent and Manage VTE (Venous Thromboembolism) Risk  Recent Flowsheet Documentation  Taken 1/9/2023 0700 by Cornelia Webster RN  Activity Management: activity adjusted per tolerance  Goal: Optimal Comfort and Wellbeing  Outcome: Ongoing, Progressing  Intervention: Monitor Pain and Promote Comfort  Recent Flowsheet Documentation  Taken 1/9/2023 0913 by Cornelia Webster RN  Pain Management Interventions: see MAR  Goal: Readiness for Transition of Care  Outcome: Ongoing, Progressing     Problem: Fall Injury Risk  Goal: Absence of Fall and Fall-Related Injury  Outcome: Ongoing, Progressing  Intervention: Promote Injury-Free Environment  Recent Flowsheet Documentation  Taken 1/9/2023 1500 by Cornelia Webster RN  Safety Promotion/Fall Prevention: safety round/check completed  Taken 1/9/2023 1300 by Cornelia Webster RN  Safety Promotion/Fall Prevention: safety round/check completed  Taken 1/9/2023 1100 by Cornelia Webster RN  Safety  Promotion/Fall Prevention: safety round/check completed  Taken 1/9/2023 0900 by Cornelia Webster, RN  Safety Promotion/Fall Prevention: safety round/check completed  Taken 1/9/2023 0700 by Cornelia Webster, RN  Safety Promotion/Fall Prevention: safety round/check completed   Goal Outcome Evaluation:      NPO after midnight for EGD tomorrow.

## 2023-01-09 NOTE — PROGRESS NOTES
[facility] Medicine   INPATIENT PROGRESS NOTE      Patient Name: Ariana Martinez  Date of Admission: 2023  Today's Date: 23  Length of Stay: 0  Primary Care Physician: Dolly Foss APRN    Subjective   Chief Complaint and HPI:  The patient is a 63 y.o. female who presented to the emergency department on 2023 with chief complaint of chest pain.  She described the chest pain as midepigastric, radiating around her right lower rib cage/upper abdomen to her back.  She deniesd associated radiation.  She complained of mild associated nausea but no vomiting.  Also felt short of breath at times.  She was laying in bed when the pain started.  The pain was continuous, did not resolve until she received 1 sublingual nitroglycerin and Dilaudid here in the emergency department.  She has a long history of cardiac issues including history of CABG, history of PCI, CHF.  Most recent left heart cath in 2022 showing the followin.  One-vessel obstructive coronary artery disease involving chronic total occlusion of the LAD.  LIMA to LAD is patent, target vessel is small in caliber with severe diffuse disease but not amenable to PCI  2.  Patent stents in the LAD extending into the diagonal  3.  Mild disease in the RCA and left circumflex which is unchanged from before  4.  Normal left-sided filling pressures     She denied dark or tarry sticky stools, hematemesis, vomiting.     She was evaluated by cardiology who felt that the pain was more likely to be GI related given her history of gastroparesis.    Patient still with pain throughout the night, decreased p.o. intake secondary to pain.  CT abdomen results discussed, patient to see GI later today.  Otherwise no new complaints.      Review of Systems   Review of Systems as of 23   Constitutional: Negative.    HENT: Negative.    Eyes: Negative.    Respiratory: Negative.    Cardiovascular: Negative.     Gastrointestinal: Positive for abdominal pain  Endocrine: Negative.    Genitourinary: Negative.    Musculoskeletal: Negative.    Skin: Negative.    Neurological: Negative.    Psychiatric/Behavioral: Negative.      All pertinent negatives and positives are as above. All other systems have been reviewed and are negative unless otherwise stated.     Objective    Temp:  [96.8 °F (36 °C)-97.7 °F (36.5 °C)] 96.8 °F (36 °C)  Heart Rate:  [59-64] 62  Resp:  [18] 18  BP: (100-122)/(50-58) 108/50  Physical Exam    Vitals and nursing note reviewed.   Constitutional:       General: No acute distress.     Appearance: Normal appearance.   HENT:      Head: Normocephalic and atraumatic.      Mouth: Mucous membranes are moist.   Eyes:      Conjunctivae/sclerae: Conjunctivae normal.      Pupils: Pupils are equal, round, and reactive to light.   Cardiovascular:      Rate and Rhythm: Normal rate and regular rhythm.   Pulmonary:      Effort: Pulmonary effort is normal.      Breath sounds: Normal breath sounds.   Abdominal:      General: Abdomen is flat.      Palpations: Abdomen is soft.      Tenderness: Mild midepigastric and right upper quadrant tenderness that rebound or guarding  Musculoskeletal:         General: No signs of injury. Normal range of motion.   Skin:     General: Skin is warm and dry.   Neurological:      General: No focal deficit present.      Mental Status: Alert and oriented  Psychiatric:         Mood and Affect: Mood normal.         Behavior: Behavior normal.         Results Review:  I have reviewed the labs, radiology results, and diagnostic studies.    Laboratory Data:   Results from last 7 days   Lab Units 01/09/23  0740 01/08/23  0607 01/07/23  0653   WBC 10*3/mm3 7.24 9.27 9.28   HEMOGLOBIN g/dL 13.5 13.2 12.2   HEMATOCRIT % 41.1 40.8 37.5   PLATELETS 10*3/mm3 279 401 373        Results from last 7 days   Lab Units 01/08/23  0607 01/07/23  0653 01/06/23  1051   SODIUM mmol/L 139 139 139   POTASSIUM mmol/L 3.5  3.6 3.6   CHLORIDE mmol/L 100 101 100   CO2 mmol/L 28.0 28.0 30.0*   BUN mg/dL 14 12 13   CREATININE mg/dL 1.10* 0.97 1.02*   CALCIUM mg/dL 9.4 9.2 9.6   BILIRUBIN mg/dL  --   --  0.3   ALK PHOS U/L  --   --  120*   ALT (SGPT) U/L  --   --  18   AST (SGOT) U/L  --   --  14   GLUCOSE mg/dL 111* 212* 204*       Culture Data:   No results found for: BLOODCX  No results found for: URINECX  No results found for: RESPCX  No results found for: WOUNDCX  No results found for: STOOLCX  No components found for: BODYFLD    Radiology Data:   Imaging Results (Last 24 Hours)     Procedure Component Value Units Date/Time    CT Abdomen Pelvis With Contrast [[id number]] Collected: 01/08/23 1129     Updated: 01/09/23 0113    Narrative:      EXAM: CT Abdomen and Pelvis with contrast     INDICATION:  Abdominal pain, acute, nonlocalized, R07.9 Chest  pain, unspecified    TECHNIQUE: Helical CT of the abdomen and pelvis was obtained from  the diaphragm through the ischial tuberosities using contrast.  Axial, coronal and sagittal images were obtained.    Dose reduction techniques were used including automated exposure  control and/or adjustment of the mA and/or kV according to  patient size.    COMPARISON: 7/2/2021 CT    FINDINGS:  LUNG BASES: Right hemidiaphragm elevation. Unchanged 3 mm right  middle lobe pulmonary nodule on image 3.    STOMACH: No significant finding.    LIVER: No significant abnormality.     BILIARY: Pneumobilia is slightly increased since 2021.  Cholecystectomy. No biliary ductal dilation.    PANCREAS: No significant abnormality.    SPLEEN: No significant abnormality.    ADRENAL GLANDS: No significant abnormality.    KIDNEYS/BLADDER:  No significant abnormality.    VESSELS: Nonaneurysmal abdominal aorta.    LYMPH NODES: No enlarged abdominal or pelvic lymph nodes.    OTHER PELVIC: No free pelvic fluid.    GI TRACT: No significant abnormality. Appendectomy.    ABDOMINAL WALL: Unchanged bilateral lower quadrant  subcutaneous  stranding, likely inconsequential.    PERITONEUM: No ascites or pneumoperitoneum.     BONES/SPINE: No acute abnormality. Sacral stimulator noted. No  evidence of lead discontinuity.        Impression:        Slight increase in pneumobilia since 2021, possibly representing  sphincter of Oddi dysfunction.    No other acute or new findings in the abdomen or pelvis.        Electronically signed by:  Best Wilburn MD  1/9/2023 1:11 AM  CST Workstation: [id number]          I have reviewed the patient's current medications.     Assessment/Plan     .[email or web address]    Active Hospital Problems    Diagnosis    • Chest pain, unspecified type    • Gastroparesis        Chest pain, history of CABG and PCI  -Somewhat atypical chest pain to midepigastric/right upper quadrant/right lower chest wall pain, resolved with 1 nitroglycerin  - Long history of coronary artery disease with history of CABG and PCI  - Sees Dr. Snowden outpatient, will follow closely outpatient     Abdominal pain  - Continued pain right upper quadrant/right flank.  - UA in process  - CT abdomen showed mild pneumobilia but otherwise unremarkable  - GI consulted, appreciate recs    Peptic ulcer disease, gastroparesis  -Protonix     Type 2 diabetes  -Sliding scale     Hypertension  Hyperlipidemia  Anxiety  Depression  GERD  Obesity  Ménière's    Discharge Planning: I expect the patient to be discharged in 1 to 2 days      Copied text in this note has been reviewed and is accurate as of 1/9/2023       Electronically signed by Marleny Montoya PA-C, 01/09/23, 09:18 CST.

## 2023-01-09 NOTE — PLAN OF CARE
Goal Outcome Evaluation:  Plan of Care Reviewed With: patient           Outcome Evaluation: OT eval completed as co-eval with PT. Patient supine and agreeable to therapy. Pt. Mod I bed mobility. CGA to pivot transfer BSC<>Bed. (I) to don<>doff socks seated EOB. Will need goal to don<>doff prosthesis Independently. Cont skilled IP/OT to address decreased strength, and I with ADLs, IADLs, and transfers. Recommended d/c home with assist.

## 2023-01-09 NOTE — PLAN OF CARE
Problem: Fall Injury Risk  Goal: Absence of Fall and Fall-Related Injury  Intervention: Promote Injury-Free Environment  Recent Flowsheet Documentation  Taken 1/8/2023 1910 by Ping Manuel RN  Safety Promotion/Fall Prevention: safety round/check completed   Goal Outcome Evaluation:

## 2023-01-09 NOTE — PROGRESS NOTES
Discharge Planning Assessment  [facility]     Patient Name: Ariana Martinez  MRN: [id number]  Today's Date: 1/9/2023    Admit Date: 1/6/2023        Discharge Needs Assessment     Row Name 01/09/23 1021       Living Environment    People in Home spouse    Name(s) of People in Home lisa    Current Living Arrangements home    Primary Care Provided by self    Provides Primary Care For no one    Quality of Family Relationships helpful    Able to Return to Prior Arrangements yes       Resource/Environmental Concerns    Resource/Environmental Concerns none    Transportation Concerns none       Transition Planning    Patient/Family Anticipates Transition to home with help/services;home with family    Patient/Family Anticipated Services at Transition none    Transportation Anticipated family or friend will provide       Discharge Needs Assessment    Current Outpatient/Agency/Support Group clinic(s)  [facility]    Concerns to be Addressed denies needs/concerns at this time    Anticipated Changes Related to Illness none    Equipment Needed After Discharge oxygen    Outpatient/Agency/Support Group Needs homecare agency    Discharge Facility/Level of Care Needs home with home health    Patient's Choice of Community Agency(s) had used [facility] in the past and would like to use again    Discharge Coordination/Progress pt states independent prior.  good support with family.  no issues with rx, has a copay.  would like  and stated has used [facility] in the past and would like again.  no issues with transporatation.  if o2 is needed has requested [employer].               Discharge Plan    No documentation.               Continued Care and Services - Admitted Since 1/6/2023    Coordination has not been started for this encounter.       Expected Discharge Date and Time     Expected Discharge Date Expected Discharge Time    Jan 7, 2023          Demographic Summary     Row Name 01/09/23 1021       General Information    Admission Type  observation    Arrived From home    Referral Source high risk screening    Reason for Consult discharge planning    Preferred Language English    General Information Comments confirmed face sheet and pharmacy               Functional Status    No documentation.                Psychosocial    No documentation.                Abuse/Neglect    No documentation.                Legal    No documentation.                Substance Abuse    No documentation.                Patient Forms    No documentation.                   Ernestina Benavides RN

## 2023-01-09 NOTE — THERAPY EVALUATION
Patient Name: Ariana Martinez  : 1960    MRN: [id number]                              Today's Date: 2023       Admit Date: 2023    Visit Dx:     ICD-10-CM ICD-9-CM   1. Chest pain, unspecified type  R07.9 786.50   2. Impaired mobility and ADLs  Z74.09 V49.89    Z78.9    3. Iron deficiency anemia due to chronic blood loss  D50.0 280.0   4. Impaired functional mobility, balance, gait, and endurance  Z74.09 V49.89     Patient Active Problem List   Diagnosis   • 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, with long-term current use of insulin   • Closed nondisplaced fracture of fifth left metatarsal bone   • MAURICIO (generalized anxiety disorder)   • Depression, major, recurrent, moderate (HCC)   • GERD without esophagitis   • Long term prescription opiate use   • Mixed hyperlipidemia   • Vitamin D deficiency   • Seasonal allergic rhinitis   • Restrictive lung disease secondary to obesity   • Adult body mass index 37.0-37.9   • Snoring   • Class 3 severe obesity due to excess calories without serious comorbidity with body mass index (BMI) of 40.0 to 44.9 in adult (HCC)   • (HFpEF) heart failure with preserved ejection fraction (HCC)   • Type 2 diabetes mellitus with hyperglycemia, with long-term current use of insulin ([facility])   • Cyanocobalamin deficiency   • Coronary artery disease of native artery of native heart with stable angina pectoris ([facility])   • Hypertension   • Meniere's disease   • Gastroparesis   • Pulmonary hypertension ([facility])   • Pes cavus   • Primary osteoarthritis involving multiple joints   • Generalized anxiety disorder   • Chronic right-sided low back pain with right-sided sciatica   • Chest pain   • Adverse effect of iron   • Chest pain due to myocardial ischemia   • Nonrheumatic tricuspid valve regurgitation   • Iron deficiency anemia due to chronic blood loss   • Malaise and fatigue   • Ankle arthritis   • Urinary retention   • Endocarditis   • Essential hypertension    • Occlusion and stenosis of bilateral carotid arteries   • S/P BKA (below knee amputation) unilateral, left ([facility])   • Phantom pain after amputation of lower extremity ([facility])   • S/P CABG (coronary artery bypass graft)   • Severe malnutrition ([facility])   • TIA (transient ischemic attack)   • Coronary artery abnormality   • Elevated d-dimer   • Neuropathy   • Chest pain, unspecified type   • Acute pain of right shoulder   • Rotator cuff syndrome, right   • Acquired hypothyroidism   • Bilateral leg edema   • Left elbow pain     Past Medical History:   Diagnosis Date   • Acute bacterial endocarditis 3/13/2021   • Angina, class IV ([facility])    • Anxiety    • Anxiety and depression    • Arthritis    • Benign paroxysmal positional vertigo    • Bladder disorder     has bladder stimulator   • Carpal tunnel syndrome    • CHF (congestive heart failure) ([facility])    • Chronic pain    • Coronary atherosclerosis     hx CABG 2005.  is followed by Dr Kwon   • Depression    • Diabetes mellitus ([facility])     Type 2, controlled   • Diabetic polyneuropathy ([facility])    • Disease of thyroid gland    • Elevated cholesterol    • Female stress incontinence    • Foot pain, left    • Full dentures    • Gastroparesis    • GERD (gastroesophageal reflux disease)    • Hyperlipidemia    • Hypertension    • Low back pain    • Malaise and fatigue    • Multiple joint pain    • Obesity     Refuses to be weighed   • Occlusion and stenosis of bilateral carotid arteries 6/18/2021   • Otalgia     Both   • Perforation of tympanic membrane     Left   • Postoperative wound infection    • Vitamin D deficiency    • Wears glasses     reading     Past Surgical History:   Procedure Laterality Date   • ABDOMINAL SURGERY     • AMPUTATION FOOT     • ANGIOPLASTY      coronary   • ANKLE ARTHROSCOPY Left 2/26/2021    Procedure: Left foot hardware removal, ankle arthroscopy, bone grafting , left foot exostectomy;  Surgeon: Ignacio Lord DPM;  Location: [facility];  Service:  Podiatry;  Laterality: Left;   • BREAST BIOPSY Right    • CARDIAC CATHETERIZATION     • CARDIAC CATHETERIZATION N/A 6/20/2017    Procedure: Right Heart Cath;  Surgeon: Can Kwon MD PhD;  Location: [facility] CATH INVASIVE LOCATION;  Service:    • CARDIAC CATHETERIZATION N/A 2/18/2020    Procedure: Left Heart Cath;  Surgeon: Catalina Cooper MD;  Location: [facility] CATH INVASIVE LOCATION;  Service: Cardiology;  Laterality: N/A;   • CARDIAC CATHETERIZATION N/A 4/6/2022    Procedure: Left Heart Cath;  Surgeon: Sheryl Navas MD;  Location: [facility] CATH INVASIVE LOCATION;  Service: Cardiology;  Laterality: N/A;   • CARPAL TUNNEL RELEASE     • CHOLECYSTECTOMY     • COLONOSCOPY N/A 6/24/2020    Procedure: COLONOSCOPY;  Surgeon: Julián Madlonado MD;  Location: [facility] ENDOSCOPY;  Service: Gastroenterology;  Laterality: N/A;   • CORONARY ARTERY BYPASS GRAFT  2005   • ENDOSCOPY N/A 10/19/2018    Procedure: ESOPHAGOGASTRODUODENOSCOPY possible dilation;  Surgeon: Julián Maldonado MD;  Location: [facility] ENDOSCOPY;  Service: Gastroenterology   • ENDOSCOPY N/A 6/24/2020    Procedure: ESOPHAGOGASTRODUODENOSCOPY WED appt please;  Surgeon: Julián Maldonado MD;  Location: [facility] ENDOSCOPY;  Service: Gastroenterology;  Laterality: N/A;   • ENDOSCOPY N/A 6/10/2022    Procedure: ESOPHAGOGASTRODUODENOSCOPY   room 380;  Surgeon: Jeremiah Wilkins MD;  Location: [facility] ENDOSCOPY;  Service: Gastroenterology;  Laterality: N/A;   • ENDOSCOPY AND COLONOSCOPY     • FOOT SURGERY      Toes   • FOOT SURGERY     • GASTRIC BANDING      Revision, laparoscopic   • HYSTERECTOMY     • INCISION AND DRAINAGE LEG Left 3/12/2021    Procedure: Left ankle arthroscopic irrigation and debridement, screw removal;  Surgeon: Ignacio Lord DPM;  Location: [facility] OR;  Service: Podiatry;  Laterality: Left;   • MOUTH SURGERY     • SALPINGO OOPHORECTOMY     • SHOULDER SURGERY     • SUBTALAR ARTHRODESIS Left 1/16/2019    Procedure: LEFT FOOT HARDWARE  REMOVAL, FIFTH METATARSAL , OPEN REDUCTION INTERNAL FIXATION, CALCANEAL OSTEOTOMY;  Surgeon: Ignacio Lord DPM;  Location: [facility] OR;  Service: Podiatry   • SUBTALAR ARTHRODESIS Left 10/16/2019    Procedure: foot hardware removal, subtalar joint fusion  possible de/reattachment of achilles tendon        (c-arm);  Surgeon: Ignacio Lord DPM;  Location: [facility] OR;  Service: Podiatry   • SUBTALAR ARTHRODESIS Left 9/30/2020    Procedure: subtalar, talonavicular joint arthrodesis.  Removal hardware.          (c-arm);  Surgeon: Ignacio Lord DPM;  Location: [facility];  Service: Podiatry;  Laterality: Left;   • TRANSESOPHAGEAL ECHOCARDIOGRAM (LAMONTE)      With color flow      General Information     Row Name 01/09/23 1351          Physical Therapy Time and Intention    Document Type evaluation  -CZ     Mode of Treatment occupational therapy;physical therapy  -CZ     Row Name 01/09/23 1351          General Information    Patient Profile Reviewed yes  -CZ     Prior Level of Function independent:;all household mobility  -CZ     Existing Precautions/Restrictions fall  -CZ     Barriers to Rehab medically complex;previous functional deficit  -CZ     Row Name 01/09/23 1351          Living Environment    People in Home spouse  -CZ     Row Name 01/09/23 1351          Stairs Within Home, Primary    Stairs, Within Home, Primary (I) donning L BKA, ambulates with FWW, ramp to enter.  -CZ     Number of Stairs, Within Home, Primary none  -CZ     Row Name 01/09/23 1351          Cognition    Orientation Status (Cognition) oriented x 4  -CZ     Row Name 01/09/23 1351          Safety Issues, Functional Mobility    Impairments Affecting Function (Mobility) strength;endurance/activity tolerance;pain  -CZ           User Key  (r) = Recorded By, (t) = Taken By, (c) = Cosigned By    Initials Name Provider Type    CZ Carlito Montoya, PT Physical Therapist               Mobility     Row Name 01/09/23 1351          Bed Mobility    Bed  Mobility supine-sit;sit-supine  -CZ     Supine-Sit [place] (Bed Mobility) modified independence  -CZ     Sit-Supine [place] (Bed Mobility) modified independence  -CZ     Assistive Device (Bed Mobility) head of bed elevated;bed rails  -CZ     Row Name 01/09/23 1351          Bed-Chair Transfer    Bed-Chair [place] (Transfers) contact guard  -CZ     Comment, (Bed-Chair Transfer) Cues for hand placement and technique.  -CZ     Row Name 01/09/23 1351          Sit-Stand Transfer    Sit-Stand [place] (Transfers) contact guard  -CZ     Row Name 01/09/23 1351          Gait/Stairs (Locomotion)    Distance in Feet (Gait) 0  -CZ     Comment, (Gait/Stairs) Resistent to donning L BKA prosthesis today, excellent effort transferring to [facility] without prosthesis.  -CZ           User Key  (r) = Recorded By, (t) = Taken By, (c) = Cosigned By    Initials Name Provider Type    Carlito Bryan, PT Physical Therapist               Obj/Interventions     Row Name 01/09/23 1351          Range of Motion Comprehensive    General Range of Motion bilateral lower extremity ROM WFL  -CZ     Comment, General Range of Motion L BKA.  -CZ     Row Name 01/09/23 1351          Strength Comprehensive (MMT)    Comment, General Manual Muscle Testing (MMT) Assessment LLE: 3/5, RLE: 4/5 grossly.  -CZ     Row Name 01/09/23 1351          Sensory Assessment (Somatosensory)    Sensory Assessment (Somatosensory) bilateral LE  -CZ     Bilateral LE Sensory Assessment light touch awareness;impaired  -CZ           User Key  (r) = Recorded By, (t) = Taken By, (c) = Cosigned By    Initials Name Provider Type    Carlito Bryan, PT Physical Therapist               Goals/Plan     Row Name 01/09/23 1351          Bed Mobility Goal 1 (PT)    Activity/Assistive Device (Bed Mobility Goal 1, PT) sit to supine/supine to sit  -CZ     [place] Level/Cues Needed (Bed Mobility Goal 1, PT) independent  -CZ     Time Frame (Bed Mobility Goal 1, PT) by  discharge  -CZ     Strategies/Barriers (Bed Mobility Goal 1, PT) HOB flat, no bed rails.  -CZ     Progress/Outcomes (Bed Mobility Goal 1, PT) goal not met  -CZ     Row Name 01/09/23 1351          Transfer Goal 1 (PT)    Activity/Assistive Device (Transfer Goal 1, PT) sit-to-stand/stand-to-sit;bed-to-chair/chair-to-bed;walker, rolling  -CZ     [place] Level/Cues Needed (Transfer Goal 1, PT) modified independence  -CZ     Time Frame (Transfer Goal 1, PT) by discharge  -CZ     Strategies/Barriers (Transfers Goal 1, PT) L BKA prosthesis.  -CZ     Progress/Outcome (Transfer Goal 1, PT) goal not met  -CZ     Row Name 01/09/23 1351          Gait Training Goal 1 (PT)    Activity/Assistive Device (Gait Training Goal 1, PT) walker, rolling  -CZ     Distance (Gait Training Goal 1, PT) 75'x2.  -CZ     Time Frame (Gait Training Goal 1, PT) by discharge  -CZ     Strategies/Barriers (Gait Training Goal 1, PT) L BKA prosthesis.  -CZ     Progress/Outcome (Gait Training Goal 1, PT) goal not met  -CZ     Row Name 01/09/23 1351          Therapy Assessment/Plan (PT)    Planned Therapy Interventions (PT) balance training;bed mobility training;gait training;patient/family education;transfer training;home exercise program;strengthening;stretching;ROM (range of motion)  -CZ           User Key  (r) = Recorded By, (t) = Taken By, (c) = Cosigned By    Initials Name Provider Type    CZ Carlito Montoya, PT Physical Therapist               Clinical Impression     Row Name 01/09/23 1351          Pain    Pretreatment Pain Rating 7/10  -CZ     Posttreatment Pain Rating 7/10  -CZ     Pain Location - Side/Orientation Right  -CZ     Pain Location - flank  -CZ     Pain Intervention(s) Medication (See MAR);Repositioned;Ambulation/increased activity;Distraction  -CZ     Row Name 01/09/23 1351          Plan of Care Review    Plan of Care Reviewed With patient  -CZ     Outcome Evaluation Initial PT evaluation complete, co-evaluation with OT.  Patient  is alert and cooperative.  She demonstrates (I) with bed mobility, requires CGA with stand pivot transfers from bed to BSC.  Patient has prosthesis for L BKA but is resistent to donning today.  Patient has own FWW at home, has assistance available from spouse, is unlikely to need HHPT upon discharge home.  Goals established, continue skilled I/P PT.  -CZ     Row Name 01/09/23 1351          Therapy Assessment/Plan (PT)    Rehab Potential (PT) good, to achieve stated therapy goals  -     Criteria for Skilled Interventions Met (PT) yes;skilled treatment is necessary  -CZ     Therapy Frequency (PT) 5 times/wk  -CZ     Row Name 01/09/23 1351          Vital Signs    Pre Systolic BP Rehab 106  -CZ     Pre Treatment Diastolic BP 69  -CZ     Post Systolic BP Rehab 133  -CZ     Post Treatment Diastolic BP 64  -CZ     Posttreatment Heart Rate (beats/min) 76  -CZ     Pre SpO2 (%) 92  -CZ     O2 Delivery Pre Treatment nasal cannula  -CZ     Post SpO2 (%) 94  -CZ     O2 Delivery Post Treatment nasal cannula  -CZ     Pre Patient Position Supine  -CZ     Post Patient Position Sitting  -CZ     Row Name 01/09/23 1351          Positioning and Restraints    Pre-Treatment Position in bed  -CZ     Post Treatment Position bed  -CZ     In Bed supine;call light within reach;encouraged to call for assist;exit alarm on  -CZ           User Key  (r) = Recorded By, (t) = Taken By, (c) = Cosigned By    Initials Name Provider Type    CZ Carlito Montoya, PT Physical Therapist               Outcome Measures     Row Name 01/09/23 1351 01/09/23 0915       How much help from another person do you currently need...    Turning from your back to your side while in flat bed without using bedrails? 4  -CZ 4  -HR    Moving from lying on back to sitting on the side of a flat bed without bedrails? 4  -CZ 4  -HR    Moving to and from a bed to a chair (including a wheelchair)? 3  -CZ 3  -HR    Standing up from a chair using your arms (e.g., wheelchair,  bedside chair)? 3  -CZ 3  -HR    Climbing 3-5 steps with a railing? 3  -CZ 2  -HR    To walk in hospital room? 3  -CZ 3  -HR    AM-PAC 6 Clicks Score (PT) 20  -CZ 19  -HR    Highest level of mobility 6 --> Walked 10 steps or more  -CZ 6 --> Walked 10 steps or more  -HR    Row Name 01/09/23 1351 01/09/23 1350       Functional Assessment    Outcome Measure Options AM-PAC 6 Clicks Basic Mobility (PT)  -CZ AM-PAC 6 Clicks Daily Activity (OT)  -          User Key  (r) = Recorded By, (t) = Taken By, (c) = Cosigned By    Initials Name Provider Type    HR Cornelia Webster, RN Registered Nurse    Carlito Bryan, PT Physical Therapist    Jessica Horvath, OT Occupational Therapist                             Physical Therapy Education     Title: PT OT SLP Therapies (In Progress)     Topic: Physical Therapy (Done)     Point: Mobility training (Done)     Learning Progress Summary           Patient Acceptance, E, VU by  at 1/9/2023 1430    Comment: PT POC, hand placement with transfers, goals for gait with prosthesis.    Acceptance, E,TB, VU by MM at 1/6/2023 1804                   Point: Home exercise program (Done)     Learning Progress Summary           Patient Acceptance, E,TB, VU by MM at 1/6/2023 1804                   Point: Body mechanics (Done)     Learning Progress Summary           Patient Acceptance, E,TB, VU by MM at 1/6/2023 1804                   Point: Precautions (Done)     Learning Progress Summary           Patient Acceptance, E,TB, VU by MM at 1/6/2023 1804                               User Key     Initials Effective Dates Name Provider Type Discipline     09/18/22 -  Carlito Montoya, PT Physical Therapist PT    MM 11/22/21 -  Ayanna Powell RN Registered Nurse Nurse              PT Recommendation and Plan  Planned Therapy Interventions (PT): balance training, bed mobility training, gait training, patient/family education, transfer training, home exercise program, strengthening, stretching,  ROM (range of motion)  Plan of Care Reviewed With: patient  Outcome Evaluation: Initial PT evaluation complete, co-evaluation with OT.  Patient is alert and cooperative.  She demonstrates (I) with bed mobility, requires CGA with stand pivot transfers from bed to C.  Patient has prosthesis for L BKA but is resistent to donning today.  Patient has own FWW at home, has assistance available from spouse, is unlikely to need HHPT upon discharge home.  Goals established, continue skilled I/P PT.     Time Calculation:    PT Charges     Row Name 01/09/23 1550             Time Calculation    Start Time 1350  -CZ      Stop Time 1436  -CZ      Time Calculation (min) 46 min  -CZ      PT Received On 01/09/23  -CZ      PT Goal Re-Cert Due Date 01/22/23  -CZ         Untimed Charges    PT Eval/Re-eval Minutes 46  -CZ         Total Minutes    Untimed Charges Total Minutes 46  -CZ       Total Minutes 46  -CZ            User Key  (r) = Recorded By, (t) = Taken By, (c) = Cosigned By    Initials Name Provider Type    CZ Carlito Montoya, PT Physical Therapist              Therapy Charges for Today     Code Description Service Date Service Provider Modifiers Qty    [id number] HC PT EVAL MOD COMPLEXITY 3 1/9/2023 Carlito Montoya, PT GP 1          PT G-Codes  Outcome Measure Options: AM-PAC 6 Clicks Basic Mobility (PT)  AM-PAC 6 Clicks Score (PT): 20  AM-PAC 6 Clicks Score (OT): 21  PT Discharge Summary  Anticipated Discharge Disposition (PT): home with assist    Carlito Montoya PT  1/9/2023

## 2023-01-09 NOTE — H&P (VIEW-ONLY)
SUBJECTIVE:   1/9/2023    Name: Ariana Martinez  DOD: 1960    REASON FOR CONSULT: Chest pain    Chief Complaint:     Chief Complaint   Patient presents with   • Chest Pain       Subjective     Patient is 63 y.o. female with past medical history of bacterial endocarditis, angina, anxiety, depression, benign paroxysmal positional vertigo, coronary atherosclerosis, diabetes mellitus, diabetic polyneuropathy, thyroid disorder, hypercholesterolemia, hyperlipidemia, hypertension, GERD, gastroparesis, esophageal ulcer presents with chest pain.  Cardiac evaluation was unremarkable so far.  Her last EGD last year was consistent with esophageal ulcer.  She is complaining of mid chest pain and midepigastric pain radiating to the back.  He has nausea and denied emesis.  Also no diarrhea constipation, rectal bleeding or weight loss.  Taking PPI daily.  Denied NSAID usage.     ROS/HISTORY/ CURRENT MEDICATIONS/OBJECTIVE/VS/PE:   Review of Systems:   Review of Systems   Constitutional: Negative for chills, fatigue, fever and unexpected weight change.   HENT: Negative for congestion, ear discharge, hearing loss, nosebleeds and sore throat.    Eyes: Negative for pain, discharge and redness.   Respiratory: Negative for cough, chest tightness, shortness of breath and wheezing.    Cardiovascular: Positive for chest pain. Negative for palpitations.   Gastrointestinal: Positive for abdominal pain and nausea. Negative for abdominal distention, blood in stool, constipation, diarrhea and vomiting.   Endocrine: Negative for cold intolerance, polydipsia, polyphagia and polyuria.   Genitourinary: Negative for dysuria, flank pain, frequency, hematuria and urgency.   Musculoskeletal: Negative for arthralgias, back pain, joint swelling and myalgias.   Skin: Negative for color change, pallor and rash.   Neurological: Negative for tremors, seizures, syncope, weakness and headaches.   Hematological: Negative for adenopathy. Does not  bruise/bleed easily.   Psychiatric/Behavioral: Negative for behavioral problems, confusion, dysphoric mood, hallucinations and suicidal ideas. The patient is not nervous/anxious.        History:     Past Medical History:   Diagnosis Date   • Acute bacterial endocarditis 3/13/2021   • Angina, class IV (Formerly [facility])    • Anxiety    • Anxiety and depression    • Arthritis    • Benign paroxysmal positional vertigo    • Bladder disorder     has bladder stimulator   • Carpal tunnel syndrome    • CHF (congestive heart failure) (Formerly [facility])    • Chronic pain    • Coronary atherosclerosis     hx CABG 2005.  is followed by Dr Kwon   • Depression    • Diabetes mellitus ([facility])     Type 2, controlled   • Diabetic polyneuropathy ([facility])    • Disease of thyroid gland    • Elevated cholesterol    • Female stress incontinence    • Foot pain, left    • Full dentures    • Gastroparesis    • GERD (gastroesophageal reflux disease)    • Hyperlipidemia    • Hypertension    • Low back pain    • Malaise and fatigue    • Multiple joint pain    • Obesity     Refuses to be weighed   • Occlusion and stenosis of bilateral carotid arteries 6/18/2021   • Otalgia     Both   • Perforation of tympanic membrane     Left   • Postoperative wound infection    • Vitamin D deficiency    • Wears glasses     reading     Past Surgical History:   Procedure Laterality Date   • ABDOMINAL SURGERY     • AMPUTATION FOOT     • ANGIOPLASTY      coronary   • ANKLE ARTHROSCOPY Left 2/26/2021    Procedure: Left foot hardware removal, ankle arthroscopy, bone grafting , left foot exostectomy;  Surgeon: Ignacio Lord DPM;  Location: [facility] OR;  Service: Podiatry;  Laterality: Left;   • BREAST BIOPSY Right    • CARDIAC CATHETERIZATION     • CARDIAC CATHETERIZATION N/A 6/20/2017    Procedure: Right Heart Cath;  Surgeon: Can Kwon MD PhD;  Location: [facility] CATH INVASIVE LOCATION;  Service:    • CARDIAC CATHETERIZATION N/A 2/18/2020    Procedure: Left Heart Cath;   Surgeon: Catalina Cooper MD;  Location: [facility] CATH INVASIVE LOCATION;  Service: Cardiology;  Laterality: N/A;   • CARDIAC CATHETERIZATION N/A 4/6/2022    Procedure: Left Heart Cath;  Surgeon: Sheryl Navas MD;  Location: [facility] CATH INVASIVE LOCATION;  Service: Cardiology;  Laterality: N/A;   • CARPAL TUNNEL RELEASE     • CHOLECYSTECTOMY     • COLONOSCOPY N/A 6/24/2020    Procedure: COLONOSCOPY;  Surgeon: Julián Maldonado MD;  Location: [facility] ENDOSCOPY;  Service: Gastroenterology;  Laterality: N/A;   • CORONARY ARTERY BYPASS GRAFT  2005   • ENDOSCOPY N/A 10/19/2018    Procedure: ESOPHAGOGASTRODUODENOSCOPY possible dilation;  Surgeon: Julián Maldonado MD;  Location: [facility] ENDOSCOPY;  Service: Gastroenterology   • ENDOSCOPY N/A 6/24/2020    Procedure: ESOPHAGOGASTRODUODENOSCOPY WED appt please;  Surgeon: Julián Maldonado MD;  Location: [facility] ENDOSCOPY;  Service: Gastroenterology;  Laterality: N/A;   • ENDOSCOPY N/A 6/10/2022    Procedure: ESOPHAGOGASTRODUODENOSCOPY   room 380;  Surgeon: Jeremiah Wilkins MD;  Location: [facility] ENDOSCOPY;  Service: Gastroenterology;  Laterality: N/A;   • ENDOSCOPY AND COLONOSCOPY     • FOOT SURGERY      Toes   • FOOT SURGERY     • GASTRIC BANDING      Revision, laparoscopic   • HYSTERECTOMY     • INCISION AND DRAINAGE LEG Left 3/12/2021    Procedure: Left ankle arthroscopic irrigation and debridement, screw removal;  Surgeon: Ignacio Lord DPM;  Location: [facility] OR;  Service: Podiatry;  Laterality: Left;   • MOUTH SURGERY     • SALPINGO OOPHORECTOMY     • SHOULDER SURGERY     • SUBTALAR ARTHRODESIS Left 1/16/2019    Procedure: LEFT FOOT HARDWARE REMOVAL, FIFTH METATARSAL , OPEN REDUCTION INTERNAL FIXATION, CALCANEAL OSTEOTOMY;  Surgeon: Ignacio Lord DPM;  Location: [facility] OR;  Service: Podiatry   • SUBTALAR ARTHRODESIS Left 10/16/2019    Procedure: foot hardware removal, subtalar joint fusion  possible de/reattachment of achilles tendon        (c-arm);  Surgeon:  Ignacio Lord DPM;  Location: [facility];  Service: Podiatry   • SUBTALAR ARTHRODESIS Left 9/30/2020    Procedure: subtalar, talonavicular joint arthrodesis.  Removal hardware.          (c-arm);  Surgeon: Ignacio Lord DPM;  Location: [facility];  Service: Podiatry;  Laterality: Left;   • TRANSESOPHAGEAL ECHOCARDIOGRAM (LAMONTE)      With color flow     Family History   Problem Relation Age of Onset   • Diabetes Other    • Heart disease Other    • Hypertension Other    • Heart disease Mother    • Stroke Mother    • Hypertension Mother    • Diabetes Sister    • Heart disease Sister    • Hypertension Sister    • Heart disease Sister    • Diabetes Sister    • Hypertension Sister    • Diabetes Sister    • Diabetes Sister    • Diabetes Sister    • Diabetes Sister      Social History     Tobacco Use   • Smoking status: Never   • Smokeless tobacco: Never   Vaping Use   • Vaping Use: Never used   Substance Use Topics   • Alcohol use: No   • Drug use: No     Medications Prior to Admission   Medication Sig Dispense Refill Last Dose   • amLODIPine (NORVASC) 5 MG tablet Take 1 tablet by mouth Daily. 90 tablet 1 1/6/2023   • ARIPiprazole (ABILIFY) 5 MG tablet TAKE 1 TABLET BY MOUTH EVERY DAY 90 tablet 0 1/6/2023   • aspirin  MG tablet Take 1 tablet by mouth Daily. 30 tablet 0 1/6/2023   • atorvastatin (LIPITOR) 80 MG tablet Take 1 tablet by mouth Daily. 90 tablet 1 1/6/2023   • B-D ULTRAFINE III SHORT PEN 31G X 8 MM misc USE TO INJECT 5 TIMES A  each 11 1/6/2023   • B-D ULTRAFINE III SHORT PEN 31G X 8 MM misc USE UP TO 4 (FOUR) TIMES DAILY WITH INSULIN AS DIRECTED. 200 each 1 1/6/2023   • BD SHARPS CONTAINER HOME misc 1 each Take As Directed. 1 each 0 1/6/2023   • Blood Glucose Monitoring Suppl (ONE TOUCH ULTRA MINI) w/Device kit Patient will need the Accu-Check Avitio meter 1 each 0 1/6/2023   • butalbital-acetaminophen-caffeine (FIORICET, ESGIC) -40 MG per tablet Take one to two tablets by mouth per  headache episode as needed. 6 tablet 0 Past Month   • Calcium Citrate-Vitamin D 250-200 MG-UNIT tablet Take 2 tablets by mouth 2 (two) times a day.   Past Week   • clopidogrel (PLAVIX) 75 MG tablet Take 1 tablet by mouth Daily. 90 tablet 0 1/6/2023   • [facility] Diclofenac Sodium 1 % gel gel APPLY 4 G TOPICALLY TO THE APPROPRIATE AREA AS DIRECTED 2 (TWO) TIMES A DAY. SMALL AMOUNT TO AFFECTED AREA 100 g 0 1/6/2023   • folic acid (FOLVITE) 1 MG tablet TAKE 1 TABLET BY MOUTH EVERY DAY 90 tablet 1 1/6/2023   • furosemide (LASIX) 40 MG tablet TAKE 1 TABLET BY MOUTH EVERY DAY 30 tablet 0 1/6/2023   • gabapentin (NEURONTIN) 100 MG capsule Take 1 capsule by mouth Every 12 (Twelve) Hours. 60 capsule 0 1/6/2023   • glucose blood (Accu-Chek Guide) test strip 1 each by Other route 4 (Four) Times a Day. To test blood sugar 4X daily. For  Dx E11.65 600 each 1 1/6/2023   • glucose monitor monitoring kit 1 each Daily. accucheck eve meter, E11.9 1 each 0 1/6/2023   • hydroCHLOROthiazide (HYDRODIURIL) 12.5 MG tablet TAKE 1 TABLET BY MOUTH EVERY DAY 30 tablet 1 1/6/2023   • HYDROcodone-acetaminophen (NORCO) 7.5-325 MG per tablet Take 1 tablet by mouth Every 6 (Six) Hours As Needed for Moderate Pain. 120 tablet 0 1/5/2023   • insulin aspart (NovoLOG FlexPen) 100 UNIT/ML solution pen-injector sc pen Inject up to 45 units  3 TIMES A DAY BEFORE MEALS 90 mL 11 1/6/2023   • Insulin Glargine (BASAGLAR KWIKPEN) 100 UNIT/ML injection pen Inject 40 units into the appropriate area every morning and 35 units into the appropriate area every night 30 mL 4 1/6/2023   • isosorbide mononitrate (IMDUR) 30 MG 24 hr tablet TAKE 1 TABLET BY MOUTH EVERY DAY 30 tablet 0 1/6/2023   • Lancets (accu-chek soft touch) lancets As directed 100 each 12 1/6/2023   • levothyroxine (SYNTHROID, LEVOTHROID) 25 MCG tablet Take 1 tablet by mouth Daily. 30 tablet 11 1/5/2023   • meloxicam (MOBIC) 15 MG tablet TAKE 1 TABLET BY MOUTH EVERY DAY WITH FOOD 30 tablet 3 1/6/2023   •  "methocarbamol (ROBAXIN) 500 MG tablet TAKE 1 TABLET BY MOUTH 2 TIMES A DAY AS NEEDED FOR MUSCLE SPASMS. 60 tablet 5 Past Month   • metoclopramide (REGLAN) 10 MG tablet TAKE 1 TABLET BY MOUTH 4 (FOUR) TIMES A DAY AS NEEDED (NAUSEA). 120 tablet 0 Past Week   • metoclopramide (REGLAN) 5 MG tablet Take 1 tablet by mouth 3 (Three) Times a Day As Needed (vomiting). 12 tablet 0 Past Week   • metoprolol succinate XL (TOPROL-XL) 50 MG 24 hr tablet Take 1 tablet by mouth Daily. Dose increased 5/24/21 90 tablet 1 1/6/2023   • Needle, Disp, (Easy Touch FlipLock Needles) 25G X 1-1/2\" misc 1 each Every 28 (Twenty-Eight) Days. For administering B12 cyanocobalomin. Dx vitamin B12 deficiency. 10 each 3 1/6/2023   • Needle, Disp, (Hypodermic Needle) 20G X 1\" misc 1 each Every 28 (Twenty-Eight) Days. For drawing up B12 for injection monthly, change back to smaller gauge needle prior to injection. Dx Vitamin B12  Deficiency. 10 each 2 1/6/2023   • ondansetron (ZOFRAN) 8 MG tablet Take 1 tablet by mouth Every 8 (Eight) Hours As Needed for Nausea or Vomiting. 18 tablet 1 1/5/2023   • ondansetron ODT (ZOFRAN-ODT) 4 MG disintegrating tablet Place 1 tablet on the tongue 4 (Four) Times a Day As Needed for Nausea or Vomiting. 12 tablet 0 1/5/2023   • pantoprazole (PROTONIX) 20 MG EC tablet Take 2 tablets by mouth Daily. 90 tablet 3 1/6/2023   • ranolazine (RANEXA) 500 MG 12 hr tablet Take 1 tablet by mouth Every 12 (Twelve) Hours. 180 tablet 3 1/6/2023   • Syringe 20G X 1-1/2\" 3 ML misc 1 each Every 28 (Twenty-Eight) Days. For injection cyanocobalomin, Dx vit B12 deficiency. 10 each 2 1/6/2023   • venlafaxine XR (EFFEXOR-XR) 75 MG 24 hr capsule TAKE 1 CAPSULE BY MOUTH DAILY WITH BREAKFAST. 90 capsule 0 1/6/2023   • vitamin D (ERGOCALCIFEROL) 1.25 MG (08921 UT) capsule capsule TAKE 1 CAPSULE BY MOUTH EVERY 7 DAYS. 36 capsule 0 Past Week   • cyanocobalamin 1000 MCG/ML injection Inject 1 mL into the appropriate muscle as directed by prescriber " Every 28 (Twenty-Eight) Days. 1 mL 2 More than a month   • meclizine (ANTIVERT) 25 MG tablet Take 1 tablet by mouth 3 (Three) Times a Day As Needed for dizziness. 90 tablet 6 Unknown   • naloxone (NARCAN) 0.4 MG/ML injection Infuse 0.5 mL into a venous catheter Every 5 (Five) Minutes As Needed for Opioid Reversal.      • nitroglycerin (NITROSTAT) 0.4 MG SL tablet Place 1 tablet under the tongue Every 5 (Five) Minutes As Needed for Chest Pain. Take no more than 3 doses in 15 minutes. 20 tablet 11 Unknown     Allergies:  Seroquel [quetiapine], Avandia [rosiglitazone], Morphine and related, and Oxycodone    I have reviewed the patient's medical history, surgical history and family history in the available medical record system.     Current Medications:     Current Facility-Administered Medications   Medication Dose Route Frequency Provider Last Rate Last Admin   • acetaminophen (TYLENOL) tablet 650 mg  650 mg Oral Q6H PRN Behroozi, Saeid, MD   650 mg at 01/08/23 0613   • amLODIPine (NORVASC) tablet 5 mg  5 mg Oral Daily Marleny Montoya PA-C   5 mg at 01/07/23 0849   • ARIPiprazole (ABILIFY) tablet 5 mg  5 mg Oral Daily Marleny Montoya PA-C   5 mg at 01/09/23 0832   • aspirin EC tablet 325 mg  325 mg Oral Daily Marleny Montoya PA-C   325 mg at 01/09/23 0832   • aspirin tablet 325 mg  325 mg Oral Once Addi Johnson MD       • atorvastatin (LIPITOR) tablet 80 mg  80 mg Oral Daily Marleny Montoya PA-C   80 mg at 01/09/23 0832   • clopidogrel (PLAVIX) tablet 75 mg  75 mg Oral Daily Marleny Montoya PA-C   75 mg at 01/09/23 0834   • cyanocobalamin injection 1,000 mcg  1,000 mcg Intramuscular Q28 Days Marleny Montoya PA-C   1,000 mcg at 01/06/23 2115   • dextrose (D50W) (25 g/50 mL) IV injection 25 g  25 g Intravenous Q15 Min PRN Marleny Montoya PA-C       • dextrose (GLUTOSE) oral gel 15 g  15 g Oral Q15 Min PRN Marleny Montoya PA-C       • folic acid (FOLVITE) tablet 1,000 mcg  1,000 mcg Oral Daily Jan  TAYLOR Farmer   1,000 mcg at 01/09/23 0833   • furosemide (LASIX) tablet 40 mg  40 mg Oral Daily Marleny Montoya PA-C   40 mg at 01/09/23 0834   • glucagon (human recombinant) (GLUCAGEN DIAGNOSTIC) injection 1 mg  1 mg Intramuscular Q15 Min PRN Marleny Montoya PA-C       • heparin (porcine) 5000 UNIT/ML injection 5,000 Units  5,000 Units Subcutaneous Q8H Marleny Montoya PA-C   5,000 Units at 01/09/23 1357   • hydroCHLOROthiazide (HYDRODIURIL) tablet 12.5 mg  12.5 mg Oral Daily Marleny Montoya PA-C   12.5 mg at 01/09/23 0834   • HYDROcodone-acetaminophen (NORCO) 7.5-325 MG per tablet 1 tablet  1 tablet Oral Q6H PRN Marleny Montoya PA-C   1 tablet at 01/09/23 1441   • HYDROmorphone (DILAUDID) injection 0.25 mg  0.25 mg Intravenous Q4H PRN Behroozi, Saeid, MD   0.25 mg at 01/09/23 0913   • Insulin Aspart (novoLOG) injection 0-14 Units  0-14 Units Subcutaneous TID AC Marleny Montoya PA-C   3 Units at 01/08/23 1110   • insulin detemir (LEVEMIR) injection 35 Units  35 Units Subcutaneous Nightly Marleny Montoya PA-C   35 Units at 01/08/23 2144   • insulin detemir (LEVEMIR) injection 40 Units  40 Units Subcutaneous Daily Marleny Montoya PA-C   40 Units at 01/09/23 0835   • isosorbide mononitrate (IMDUR) 24 hr tablet 30 mg  30 mg Oral Daily Marleny Montoya PA-C   30 mg at 01/09/23 0832   • levothyroxine (SYNTHROID, LEVOTHROID) tablet 25 mcg  25 mcg Oral Q AM Marleny Montoya PA-C   25 mcg at 01/09/23 0536   • meclizine (ANTIVERT) tablet 25 mg  25 mg Oral TID PRN Marleny Montoya PA-C       • meloxicam (MOBIC) tablet 15 mg  15 mg Oral Daily Marleny Montoya PA-C   15 mg at 01/09/23 0834   • methocarbamol (ROBAXIN) tablet 500 mg  500 mg Oral BID PRN Marleny Montoya PA-C       • metoprolol succinate XL (TOPROL-XL) 24 hr tablet 50 mg  50 mg Oral Daily Marleny Montoya PA-C   50 mg at 01/07/23 0849   • nitroglycerin (NITROSTAT) SL tablet 0.4 mg  0.4 mg Sublingual Q5 Min PRN Marleny Montoya PA-C       • ondansetron  (ZOFRAN) tablet 8 mg  8 mg Oral Q8H PRN Marleny Montoya PA-C   8 mg at 01/09/23 1142   • pantoprazole (PROTONIX) EC tablet 40 mg  40 mg Oral QAM Marleny Montoya PA-C   40 mg at 01/09/23 0536   • ranolazine (RANEXA) 12 hr tablet 500 mg  500 mg Oral Q12H Marleny Montoya PA-C   500 mg at 01/09/23 0833   • sodium chloride 0.9 % flush 10 mL  10 mL Intravenous PRN Addi Johnson MD       • sodium chloride 0.9 % flush 10 mL  10 mL Intravenous Q12H Marleny Montoya PA-C   10 mL at 01/09/23 0900   • sodium chloride 0.9 % flush 10 mL  10 mL Intravenous PRN Marleny Montoya PA-C       • sodium chloride 0.9 % infusion 40 mL  40 mL Intravenous PRN Marleny Montoya PA-C       • venlafaxine XR (EFFEXOR-XR) 24 hr capsule 75 mg  75 mg Oral Daily With Breakfast Marleny Montoya PA-C   75 mg at 01/09/23 0833       Objective     Physical Exam:   Temp:  [96.8 °F (36 °C)-97.7 °F (36.5 °C)] 96.8 °F (36 °C)  Heart Rate:  [59-69] 69  Resp:  [18] 18  BP: (100-123)/(50-58) 123/58    Physical Exam:  General Appearance:    Alert, cooperative, in no acute distress   Head:    Normocephalic, without obvious abnormality, atraumatic   Eyes:            Lids and lashes normal, conjunctivae and sclerae normal, no   icterus, no pallor, corneas clear, PERRLA   Ears:    Ears appear intact with no abnormalities noted   Throat:   No oral lesions, no thrush, oral mucosa moist   Neck:   No adenopathy, supple, trachea midline, no thyromegaly, no     carotid bruit, no JVD   Back:     No kyphosis present, no scoliosis present, no skin lesions,       erythema or scars, no tenderness to percussion or                   palpation,   range of motion normal   Lungs:     Clear to auscultation,respirations regular, even and                   unlabored    Heart:    Regular rhythm and normal rate, normal S1 and S2, no            murmur, no gallop, no rub, no click   Breast Exam:    Deferred   Abdomen:     Normal bowel sounds, no masses, no organomegaly, soft         nontender, nondistended, no guarding, no rebound                 tenderness   Genitalia:    Deferred   Extremities:   Moves all extremities well, no edema, no cyanosis, no              redness   Pulses:   Pulses palpable and equal bilaterally   Skin:   No bleeding, bruising or rash   Lymph nodes:   No palpable adenopathy   Neurologic:   Cranial nerves 2 - 12 grossly intact, sensation intact, DTR        present and equal bilaterally      Results Review:     Lab Results   Component Value Date    WBC 7.24 01/09/2023    WBC 9.27 01/08/2023    WBC 9.28 01/07/2023    HGB 13.5 01/09/2023    HGB 13.2 01/08/2023    HGB 12.2 01/07/2023    HCT 41.1 01/09/2023    HCT 40.8 01/08/2023    HCT 37.5 01/07/2023     01/09/2023     01/08/2023     01/07/2023     Results from last 7 days   Lab Units 01/06/23  1051   ALK PHOS U/L 120*   ALT (SGPT) U/L 18   AST (SGOT) U/L 14     Results from last 7 days   Lab Units 01/06/23  1051   BILIRUBIN mg/dL 0.3   ALK PHOS U/L 120*     No results found for: LIPASE  Lab Results   Component Value Date    INR 1.05 12/22/2022    INR 0.92 05/31/2022    INR 0.95 02/05/2022         Radiology Review:  Imaging Results (Last 72 Hours)     Procedure Component Value Units Date/Time    CT Abdomen Pelvis With Contrast [[id number]] Collected: 01/08/23 1129     Updated: 01/09/23 0113    Narrative:      EXAM: CT Abdomen and Pelvis with contrast     INDICATION:  Abdominal pain, acute, nonlocalized, R07.9 Chest  pain, unspecified    TECHNIQUE: Helical CT of the abdomen and pelvis was obtained from  the diaphragm through the ischial tuberosities using contrast.  Axial, coronal and sagittal images were obtained.    Dose reduction techniques were used including automated exposure  control and/or adjustment of the mA and/or kV according to  patient size.    COMPARISON: 7/2/2021 CT    FINDINGS:  LUNG BASES: Right hemidiaphragm elevation. Unchanged 3 mm right  middle lobe pulmonary nodule on image  3.    STOMACH: No significant finding.    LIVER: No significant abnormality.     BILIARY: Pneumobilia is slightly increased since 2021.  Cholecystectomy. No biliary ductal dilation.    PANCREAS: No significant abnormality.    SPLEEN: No significant abnormality.    ADRENAL GLANDS: No significant abnormality.    KIDNEYS/BLADDER:  No significant abnormality.    VESSELS: Nonaneurysmal abdominal aorta.    LYMPH NODES: No enlarged abdominal or pelvic lymph nodes.    OTHER PELVIC: No free pelvic fluid.    GI TRACT: No significant abnormality. Appendectomy.    ABDOMINAL WALL: Unchanged bilateral lower quadrant subcutaneous  stranding, likely inconsequential.    PERITONEUM: No ascites or pneumoperitoneum.     BONES/SPINE: No acute abnormality. Sacral stimulator noted. No  evidence of lead discontinuity.        Impression:        Slight increase in pneumobilia since 2021, possibly representing  sphincter of Oddi dysfunction.    No other acute or new findings in the abdomen or pelvis.        Electronically signed by:  Best Wilburn MD  1/9/2023 1:11 AM  CST Workstation: [id number]          I reviewed the patient's new clinical results.    I reviewed the patient's new imaging results and agree with the interpretation.     ASSESSMENT/PLAN:   ASSESSMENT: 1.  Chest pain, cardiac evaluation was unremarkable.  Need to rule out worsening of esophagitis.  2.  Epigastric pain with nausea, likely due to gastroparesis.  Could also be due to Candida esophagitis.  3.  GERD  4.  Gastroparesis    PLAN: 1.  Continue PPI and current supportive care  2.  Proceed with EGD in a.m. for further evaluation  The risks, benefits, and alternatives of this procedure have been discussed with the patient or the responsible party. The patient understands and agrees to proceed.         Jeremiah Wilkins MD  01/09/23  14:52 CST

## 2023-01-09 NOTE — PLAN OF CARE
Goal Outcome Evaluation:  Plan of Care Reviewed With: patient           Outcome Evaluation: Initial PT evaluation complete, co-evaluation with OT.  Patient is alert and cooperative.  She demonstrates (I) with bed mobility, requires CGA with stand pivot transfers from bed to BSC.  Patient has prosthesis for L BKA but is resistent to donning today.  Patient has own FWW at home, has assistance available from spouse, is unlikely to need HHPT upon discharge home.  Goals established, continue skilled I/P PT.

## 2023-01-09 NOTE — THERAPY EVALUATION
Patient Name: Ariana Martinez  : 1960    MRN: [id number]                              Today's Date: 2023       Admit Date: 2023    Visit Dx:     ICD-10-CM ICD-9-CM   1. Chest pain, unspecified type  R07.9 786.50   2. Impaired mobility and ADLs  Z74.09 V49.89    Z78.9      Patient Active Problem List   Diagnosis   • 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, with long-term current use of insulin   • Closed nondisplaced fracture of fifth left metatarsal bone   • MAURICIO (generalized anxiety disorder)   • Depression, major, recurrent, moderate (HCC)   • GERD without esophagitis   • Long term prescription opiate use   • Mixed hyperlipidemia   • Vitamin D deficiency   • Seasonal allergic rhinitis   • Restrictive lung disease secondary to obesity   • Adult body mass index 37.0-37.9   • Snoring   • Class 3 severe obesity due to excess calories without serious comorbidity with body mass index (BMI) of 40.0 to 44.9 in adult ([facility])   • (HFpEF) heart failure with preserved ejection fraction ([facility])   • Type 2 diabetes mellitus with hyperglycemia, with long-term current use of insulin ([facility])   • Cyanocobalamin deficiency   • Coronary artery disease of native artery of native heart with stable angina pectoris ([facility])   • Hypertension   • Meniere's disease   • Gastroparesis   • Pulmonary hypertension ([facility])   • Pes cavus   • Primary osteoarthritis involving multiple joints   • Generalized anxiety disorder   • Chronic right-sided low back pain with right-sided sciatica   • Chest pain   • Adverse effect of iron   • Chest pain due to myocardial ischemia   • Nonrheumatic tricuspid valve regurgitation   • Iron deficiency anemia due to chronic blood loss   • Malaise and fatigue   • Ankle arthritis   • Urinary retention   • Endocarditis   • Essential hypertension   • Occlusion and stenosis of bilateral carotid arteries   • S/P BKA (below knee amputation) unilateral, left ([facility])   • Phantom pain after  amputation of lower extremity ([facility])   • S/P CABG (coronary artery bypass graft)   • Severe malnutrition ([facility])   • TIA (transient ischemic attack)   • Coronary artery abnormality   • Elevated d-dimer   • Neuropathy   • Chest pain, unspecified type   • Acute pain of right shoulder   • Rotator cuff syndrome, right   • Acquired hypothyroidism   • Bilateral leg edema   • Left elbow pain     Past Medical History:   Diagnosis Date   • Acute bacterial endocarditis 3/13/2021   • Angina, class IV ([facility])    • Anxiety    • Anxiety and depression    • Arthritis    • Benign paroxysmal positional vertigo    • Bladder disorder     has bladder stimulator   • Carpal tunnel syndrome    • CHF (congestive heart failure) ([facility])    • Chronic pain    • Coronary atherosclerosis     hx CABG 2005.  is followed by Dr Kwon   • Depression    • Diabetes mellitus ([facility])     Type 2, controlled   • Diabetic polyneuropathy ([facility])    • Disease of thyroid gland    • Elevated cholesterol    • Female stress incontinence    • Foot pain, left    • Full dentures    • Gastroparesis    • GERD (gastroesophageal reflux disease)    • Hyperlipidemia    • Hypertension    • Low back pain    • Malaise and fatigue    • Multiple joint pain    • Obesity     Refuses to be weighed   • Occlusion and stenosis of bilateral carotid arteries 6/18/2021   • Otalgia     Both   • Perforation of tympanic membrane     Left   • Postoperative wound infection    • Vitamin D deficiency    • Wears glasses     reading     Past Surgical History:   Procedure Laterality Date   • ABDOMINAL SURGERY     • AMPUTATION FOOT     • ANGIOPLASTY      coronary   • ANKLE ARTHROSCOPY Left 2/26/2021    Procedure: Left foot hardware removal, ankle arthroscopy, bone grafting , left foot exostectomy;  Surgeon: Ignacio Lord DPM;  Location: [facility];  Service: Podiatry;  Laterality: Left;   • BREAST BIOPSY Right    • CARDIAC CATHETERIZATION     • CARDIAC CATHETERIZATION N/A 6/20/2017    Procedure:  Right Heart Cath;  Surgeon: Can Kwon MD PhD;  Location: [facility] CATH INVASIVE LOCATION;  Service:    • CARDIAC CATHETERIZATION N/A 2/18/2020    Procedure: Left Heart Cath;  Surgeon: Catalina Cooper MD;  Location: [facility] CATH INVASIVE LOCATION;  Service: Cardiology;  Laterality: N/A;   • CARDIAC CATHETERIZATION N/A 4/6/2022    Procedure: Left Heart Cath;  Surgeon: Sheryl Navas MD;  Location: [facility] CATH INVASIVE LOCATION;  Service: Cardiology;  Laterality: N/A;   • CARPAL TUNNEL RELEASE     • CHOLECYSTECTOMY     • COLONOSCOPY N/A 6/24/2020    Procedure: COLONOSCOPY;  Surgeon: Julián Maldonado MD;  Location: [facility] ENDOSCOPY;  Service: Gastroenterology;  Laterality: N/A;   • CORONARY ARTERY BYPASS GRAFT  2005   • ENDOSCOPY N/A 10/19/2018    Procedure: ESOPHAGOGASTRODUODENOSCOPY possible dilation;  Surgeon: Julián Maldonado MD;  Location: [facility] ENDOSCOPY;  Service: Gastroenterology   • ENDOSCOPY N/A 6/24/2020    Procedure: ESOPHAGOGASTRODUODENOSCOPY WED appt please;  Surgeon: Julián Maldonado MD;  Location: [facility] ENDOSCOPY;  Service: Gastroenterology;  Laterality: N/A;   • ENDOSCOPY N/A 6/10/2022    Procedure: ESOPHAGOGASTRODUODENOSCOPY   room 380;  Surgeon: Jeremiah Wilkins MD;  Location: [facility] ENDOSCOPY;  Service: Gastroenterology;  Laterality: N/A;   • ENDOSCOPY AND COLONOSCOPY     • FOOT SURGERY      Toes   • FOOT SURGERY     • GASTRIC BANDING      Revision, laparoscopic   • HYSTERECTOMY     • INCISION AND DRAINAGE LEG Left 3/12/2021    Procedure: Left ankle arthroscopic irrigation and debridement, screw removal;  Surgeon: Ignacio Lord DPM;  Location: [facility] OR;  Service: Podiatry;  Laterality: Left;   • MOUTH SURGERY     • SALPINGO OOPHORECTOMY     • SHOULDER SURGERY     • SUBTALAR ARTHRODESIS Left 1/16/2019    Procedure: LEFT FOOT HARDWARE REMOVAL, FIFTH METATARSAL , OPEN REDUCTION INTERNAL FIXATION, CALCANEAL OSTEOTOMY;  Surgeon: Ignacio Lord DPM;  Location: [facility] OR;   Service: Podiatry   • SUBTALAR ARTHRODESIS Left 10/16/2019    Procedure: foot hardware removal, subtalar joint fusion  possible de/reattachment of achilles tendon        (c-arm);  Surgeon: Ignacio Lord DPM;  Location: [facility];  Service: Podiatry   • SUBTALAR ARTHRODESIS Left 9/30/2020    Procedure: subtalar, talonavicular joint arthrodesis.  Removal hardware.          (c-arm);  Surgeon: Ignacio Lodr DPM;  Location: [facility];  Service: Podiatry;  Laterality: Left;   • TRANSESOPHAGEAL ECHOCARDIOGRAM ([place]AMONTE)      With color flow      General Information     Row Name 01/09/23 1350          OT Time and Intention    Document Type evaluation  -     Mode of Treatment co-treatment;occupational therapy;physical therapy  -     Row Name 01/09/23 [facility]0          General Information    Patient Profile Reviewed yes  -SA     Prior Level of Function independent:;community mobility;all household mobility;gait;ADL's  (I) donned prosthesis.  -     Existing Precautions/Restrictions fall  -     Barriers to Rehab medically complex;previous functional deficit  -     Row Name 01/09/23 1350          Living Environment    People in Home spouse  -     Row Name 01/09/23 1350          Home Main Entrance    Number of Stairs, Main Entrance none  Has a ramp.  -SA     Stair Railings, Main Entrance none  -     Row Name 01/09/23 1350          Stairs Within Home, Primary    Stairs, Within Home, Primary Uses a 4 wheel walker and w/c. Has cane available. Has prosthetic leg available. Has walk in shower with seat. Regular toilet with grab bars  -     Number of Stairs, Within Home, Primary none  -SA     Stair Railings, Within Home, Primary none  -     Row Name 01/09/23 1350          Cognition    Orientation Status (Cognition) oriented x 4  -SA     Row Name 01/09/23 1350          Safety Issues, Functional Mobility    Impairments Affecting Function (Mobility) balance;endurance/activity tolerance;pain;strength  -SA            User Key  (r) = Recorded By, (t) = Taken By, (c) = Cosigned By    Initials Name Provider Type    Jessica Horvath OT Occupational Therapist                 Mobility/ADL's     Row Name 01/09/23 1350          Bed Mobility    Bed Mobility supine-sit;sit-supine  -SA     Supine-Sit [place] (Bed Mobility) modified independence  -     Sit-Supine [place] (Bed Mobility) modified independence  -     Assistive Device (Bed Mobility) head of bed elevated;bed rails  -[facility] Name 01/09/23 [facility]0          Transfers    Transfers toilet transfer  -[facility] Name 01/09/23 [facility]          Bed-Chair Transfer    Bed-Chair [place] (Transfers) contact guard  -[facility] Name 01/09/23 135          Sit-Stand Transfer    Sit-Stand [place] (Transfers) contact guard  -[facility] Name 01/09/23 [facility]          Toilet Transfer    Type (Toilet Transfer) squat pivot  -     [place] Level (Toilet Transfer) contact guard  -     Assistive Device (Toilet Transfer) commode, bedside without drop arms  -SA     Row Name 01/09/23 [facility]0          Activities of Daily Living    BADL Assessment/Intervention lower body dressing  -SA     Row Name 01/09/23 [facility]0          Lower Body Dressing Assessment/Training    [place] Level (Lower Body Dressing) doff;don;socks;independent  -     Position (Lower Body Dressing) edge of bed sitting  -           User Key  (r) = Recorded By, (t) = Taken By, (c) = Cosigned By    Initials Name Provider Type    Jessica Horvath OT Occupational Therapist               Obj/Interventions     Row Name 01/09/23 1350          Sensory Assessment (Somatosensory)    Sensory Assessment (Somatosensory) UE sensation intact  -[facility] Name 01/09/23 1350          Range of Motion Comprehensive    General Range of Motion bilateral upper extremity ROM WFL  -     Comment, General Range of Motion BUE WFL. Pt reports some difficulty with ROM in RUE due to past rotator cuff injury.  -[facility] Name 01/09/23  1350          Strength Comprehensive (MMT)    General Manual Muscle Testing (MMT) Assessment upper extremity strength deficits identified  -SA     Comment, General Manual Muscle Testing (MMT) Assessment BUE 4/5 grossly  -SA           User Key  (r) = Recorded By, (t) = Taken By, (c) = Cosigned By    Initials Name Provider Type    Jessica Horvath OT Occupational Therapist               Goals/Plan     Row Name 01/09/23 1351          Transfer Goal 1 (OT)    Activity/Assistive Device (Transfer Goal 1, OT) sit-to-stand/stand-to-sit;bed-to-chair/chair-to-bed;commode, bedside without drop arms  -SA     [place] Level/Cues Needed (Transfer Goal 1, OT) modified independence  -SA     Time Frame (Transfer Goal 1, OT) long term goal (LTG);by discharge  -SA     Progress/Outcome (Transfer Goal 1, OT) goal not met  -[facility] Name 01/09/23 [facility]1          Bathing Goal 1 (OT)    Activity/Device (Bathing Goal 1, OT) lower body bathing  -SA     [place] Level/Cues Needed (Bathing Goal 1, OT) independent  -SA     Time Frame (Bathing Goal 1, OT) long term goal (LTG);by discharge  -SA     Progress/Outcomes (Bathing Goal 1, OT) goal not met  -[facility] Name 01/09/23 1351          Dressing Goal 1 (OT)    Activity/Device (Dressing Goal 1, OT) lower body dressing  -SA     [place]/Cues Needed (Dressing Goal 1, OT) independent  -SA     Time Frame (Dressing Goal 1, OT) long term goal (LTG);by discharge  -     Strategies/Barriers (Dressing Goal 1, OT) To don prosthetic correctly  -[facility] Name 01/09/23 1351          Toileting Goal 1 (OT)    Activity/Device (Toileting Goal 1, OT) toileting skills, all  -SA     [place] Level/Cues Needed (Toileting Goal 1, OT) independent  -SA     Time Frame (Toileting Goal 1, OT) long term goal (LTG);by discharge  -SA     Progress/Outcome (Toileting Goal 1, OT) goal not met  -     Row Name 01/09/23 1351          Therapy Assessment/Plan (OT)    Planned Therapy Interventions (OT) activity  tolerance training;manual therapy/joint mobilization;patient/caregiver education/training;adaptive equipment training;neuromuscular control/coordination retraining;prosthetic fitting/training;BADL retraining;cognitive/visual perception retraining;occupation/activity based interventions;ROM/therapeutic exercise;strengthening exercise;edema control/reduction;functional balance retraining;IADL retraining;passive ROM/stretching;wheelchair assessment/training;transfer/mobility retraining  -           User Key  (r) = Recorded By, (t) = Taken By, (c) = Cosigned By    Initials Name Provider Type     Jessica Garnica OT Occupational Therapist               Clinical Impression     Row Name 01/09/23 1350 01/09/23 0350       Pain Assessment    Pretreatment Pain Rating 7/10  -SA 7/10  -SA    Posttreatment Pain Rating 7/10  -SA 7/10  -SA    Pain Location - Side/Orientation Right  - Right  -    Pain Location lower  - lower  -    Pain Location - chest  -SA flank  -    Pre/Posttreatment Pain Comment Pt reports pain in R lower quadrant into back.  - Pt reports pain in R lower quadrant into back.  -    Pain Intervention(s) -- Medication (See MAR);Repositioned;Ambulation/increased activity  -    Row Name 01/09/23 1350 01/09/23 0350       Plan of Care Review    Plan of Care Reviewed With -- patient  -    Outcome Evaluation OT eval completed as co-eval with PT. Patient supine and agreeable to therapy. Pt. Mod I bed mobility. CGA to pivot transfer BSC<>Bed. (I) to don<>doff socks seated EOB. Will need goal to don<>doff prosthesis Independently. Cont skilled IP/OT to address decreased strength, and I with ADLs, IADLs, and transfers. Recommended d/c home with assist.  - OT eval completed as co-eval with PT. Patient supine and agreeable to therapy. Pt. Mod I bed mobility. CGA to pivot transfer BSC<>Bed. (I) to don<>doff socks seated EOB. Will need goal to don<>doff prosthesis Independently. Cont skilled IP/OT to  address decreased strength, and I with ADLs, IADLs, and transfers. Recommended d/c home with assist.  -[facility] Name 01/09/23 1350 01/09/23 0350       Therapy Assessment/Plan (OT)    Patient/Family Therapy Goal Statement (OT) return home  -SA return home  -    Rehab Potential (OT) good, to achieve stated therapy goals  - good, to achieve stated therapy goals  -    Criteria for Skilled Therapeutic Interventions Met (OT) yes;meets criteria;skilled treatment is necessary  - yes;meets criteria;skilled treatment is necessary  -    Therapy Frequency (OT) other (see comments)  5-7 d/wk  -SA other (see comments)  5-7 d/wk  -SA    Predicted Duration of Therapy Intervention (OT) Until all goals met or d/c from hospital  - Until all goals met or d/c from hospital  -[facility] Name 01/09/23 135 01/09/23 0350       Therapy Plan Review/Discharge Plan (OT)    Anticipated Discharge Disposition (OT) home with assist  - home with assist  -[facility] Name 01/09/23 1350 01/09/23 0350       Vital Signs    Pre Systolic BP Rehab 106  -  -SA    Pre Treatment Diastolic BP 69  -SA 69  -SA    Post Systolic BP Rehab -- 133  -SA    Post Treatment Diastolic BP -- 64  -SA    Pretreatment Heart Rate (beats/min) 71  -SA 71  -SA    Posttreatment Heart Rate (beats/min) -- 76  -SA    Pre SpO2 (%) 92  -SA 92  -SA    O2 Delivery Pre Treatment nasal cannula  .5 LPM  -SA nasal cannula  -SA    Post SpO2 (%) -- 94  -SA    O2 Delivery Post Treatment -- nasal cannula  -SA    Pre Patient Position Supine  -SA --    [facility] Name 01/09/23 1350          Positioning and Restraints    Pre-Treatment Position in bed  -SA     Post Treatment Position bed  -SA     In Bed supine;call light within reach;encouraged to call for assist;exit alarm on  -SA           User Key  (r) = Recorded By, (t) = Taken By, (c) = Cosigned By    Initials Name Provider Type    Jessica Horvath OT Occupational Therapist               Outcome Measures     Row Name 01/09/23 1350           How much help from another is currently needed...    Putting on and taking off regular lower body clothing? 3  -SA     Bathing (including washing, rinsing, and drying) 3  -SA     Toileting (which includes using toilet bed pan or urinal) 3  -SA     Putting on and taking off regular upper body clothing 4  -SA     Taking care of personal grooming (such as brushing teeth) 4  -SA     Eating meals 4  -SA     AM-PAC 6 Clicks Score (OT) 21  -SA     Row Name 01/09/23 1351 01/09/23 0915       How much help from another person do you currently need...    Turning from your back to your side while in flat bed without using bedrails? 4  -CZ 4  -HR    Moving from lying on back to sitting on the side of a flat bed without bedrails? 4  -CZ 4  -HR    Moving to and from a bed to a chair (including a wheelchair)? 3  -CZ 3  -HR    Standing up from a chair using your arms (e.g., wheelchair, bedside chair)? 3  -CZ 3  -HR    Climbing 3-5 steps with a railing? 3  -CZ 2  -HR    To walk in hospital room? 3  -CZ 3  -HR    AM-PAC 6 Clicks Score (PT) 20  -CZ 19  -HR    Highest level of mobility 6 --> Walked 10 steps or more  -CZ 6 --> Walked 10 steps or more  -HR    Row Name 01/09/23 1351 01/09/23 1350       Functional Assessment    Outcome Measure Options AM-PAC 6 Clicks Basic Mobility (PT)  -CZ AM-PAC 6 Clicks Daily Activity (OT)  -SA          User Key  (r) = Recorded By, (t) = Taken By, (c) = Cosigned By    Initials Name Provider Type    HR Cornelia Webster, RN Registered Nurse    Carlito Bryan, PT Physical Therapist    Jessica Horvath OT Occupational Therapist                Occupational Therapy Education     Title: PT OT SLP Therapies (In Progress)     Topic: Occupational Therapy (In Progress)     Point: ADL training (Done)     Description:   Instruct learner(s) on proper safety adaptation and remediation techniques during self care or transfers.   Instruct in proper use of assistive devices.              Learning Progress  Summary           Patient Acceptance, E,TB, VU by  at 1/9/2023 1436    Comment: OT POC, Role of OT, transfer training                   Point: Home exercise program (Not Started)     Description:   Instruct learner(s) on appropriate technique for monitoring, assisting and/or progressing therapeutic exercises/activities.              Learner Progress:  Not documented in this visit.          Point: Precautions (Done)     Description:   Instruct learner(s) on prescribed precautions during self-care and functional transfers.              Learning Progress Summary           Patient Acceptance, E,TB, VU by  at 1/9/2023 1436    Comment: OT POC, Role of OT, transfer training                   Point: Body mechanics (Done)     Description:   Instruct learner(s) on proper positioning and spine alignment during self-care, functional mobility activities and/or exercises.              Learning Progress Summary           Patient Acceptance, E,TB, VU by  at 1/9/2023 1436    Comment: OT POC, Role of OT, transfer training                               User Key     Initials Effective Dates Name Provider Type Discipline     08/11/22 -  Jessica Garnica OT Occupational Therapist OT              OT Recommendation and Plan  Planned Therapy Interventions (OT): activity tolerance training, manual therapy/joint mobilization, patient/caregiver education/training, adaptive equipment training, neuromuscular control/coordination retraining, prosthetic fitting/training, BADL retraining, cognitive/visual perception retraining, occupation/activity based interventions, ROM/therapeutic exercise, strengthening exercise, edema control/reduction, functional balance retraining, IADL retraining, passive ROM/stretching, wheelchair assessment/training, transfer/mobility retraining  Therapy Frequency (OT): other (see comments) (5-7 d/wk)  Plan of Care Review  Plan of Care Reviewed With: patient  Outcome Evaluation: OT eric completed as co-eval with  PT. Patient supine and agreeable to therapy. Pt. Mod I bed mobility. CGA to pivot transfer BSC<>Bed. (I) to don<>doff socks seated EOB. Will need goal to don<>doff prosthesis Independently. Cont skilled IP/OT to address decreased strength, and I with ADLs, IADLs, and transfers. Recommended d/c home with assist.     Time Calculation:    Time Calculation- OT     Row Name 01/09/23 1350             Time Calculation- OT    OT Start Time 1350  -SA      OT Stop Time 1437  -SA      OT Time Calculation (min) 47 min  -SA      OT Received On 01/09/23  -SA      OT Goal Re-Cert Due Date 01/22/23  -SA         Untimed Charges    OT Eval/Re-eval Minutes 47  -SA         Total Minutes    Untimed Charges Total Minutes 47  -SA       Total Minutes 47  -SA            User Key  (r) = Recorded By, (t) = Taken By, (c) = Cosigned By    Initials Name Provider Type    SA Jessica Garnica OT Occupational Therapist              Therapy Charges for Today     Code Description Service Date Service Provider Modifiers Qty    [id number] HC OT EVAL MOD COMPLEXITY 3 1/9/2023 Jessica Garnica OT GO 1               Jessica Garnica OT  1/9/2023

## 2023-01-10 ENCOUNTER — ANESTHESIA (OUTPATIENT)
Dept: GASTROENTEROLOGY | Facility: HOSPITAL | Age: 63
End: 2023-01-10
Payer: COMMERCIAL

## 2023-01-10 ENCOUNTER — ANESTHESIA EVENT (OUTPATIENT)
Dept: GASTROENTEROLOGY | Facility: HOSPITAL | Age: 63
End: 2023-01-10
Payer: COMMERCIAL

## 2023-01-10 ENCOUNTER — TELEPHONE (OUTPATIENT)
Dept: CARDIOLOGY | Facility: CLINIC | Age: 63
End: 2023-01-10
Payer: COMMERCIAL

## 2023-01-10 LAB
GLUCOSE BLDC GLUCOMTR-MCNC: 118 MG/DL (ref 70–130)
GLUCOSE BLDC GLUCOMTR-MCNC: 146 MG/DL (ref 70–130)
GLUCOSE BLDC GLUCOMTR-MCNC: 187 MG/DL (ref 70–130)

## 2023-01-10 PROCEDURE — 96376 TX/PRO/DX INJ SAME DRUG ADON: CPT

## 2023-01-10 PROCEDURE — 25010000002 HYDROMORPHONE 1 MG/ML SOLUTION: Performed by: INTERNAL MEDICINE

## 2023-01-10 PROCEDURE — G0378 HOSPITAL OBSERVATION PER HR: HCPCS

## 2023-01-10 PROCEDURE — 97535 SELF CARE MNGMENT TRAINING: CPT

## 2023-01-10 PROCEDURE — 25010000002 HEPARIN (PORCINE) PER 1000 UNITS: Performed by: INTERNAL MEDICINE

## 2023-01-10 PROCEDURE — 25010000002 ONDANSETRON PER 1 MG: Performed by: PHYSICIAN ASSISTANT

## 2023-01-10 PROCEDURE — 25010000002 PROPOFOL 10 MG/ML EMULSION

## 2023-01-10 PROCEDURE — 25010000002 ONDANSETRON PER 1 MG: Performed by: INTERNAL MEDICINE

## 2023-01-10 PROCEDURE — 97110 THERAPEUTIC EXERCISES: CPT

## 2023-01-10 PROCEDURE — 63710000001 INSULIN DETEMIR PER 5 UNITS: Performed by: INTERNAL MEDICINE

## 2023-01-10 PROCEDURE — 96372 THER/PROPH/DIAG INJ SC/IM: CPT

## 2023-01-10 PROCEDURE — 88305 TISSUE EXAM BY PATHOLOGIST: CPT

## 2023-01-10 PROCEDURE — 43239 EGD BIOPSY SINGLE/MULTIPLE: CPT | Performed by: INTERNAL MEDICINE

## 2023-01-10 PROCEDURE — 82962 GLUCOSE BLOOD TEST: CPT

## 2023-01-10 PROCEDURE — 25010000002 HEPARIN (PORCINE) PER 1000 UNITS: Performed by: PHYSICIAN ASSISTANT

## 2023-01-10 RX ORDER — DEXTROSE AND SODIUM CHLORIDE 5; .45 G/100ML; G/100ML
30 INJECTION, SOLUTION INTRAVENOUS CONTINUOUS PRN
Status: DISCONTINUED | OUTPATIENT
Start: 2023-01-10 | End: 2023-01-11 | Stop reason: HOSPADM

## 2023-01-10 RX ORDER — LIDOCAINE HYDROCHLORIDE 20 MG/ML
INJECTION, SOLUTION INTRAVENOUS AS NEEDED
Status: DISCONTINUED | OUTPATIENT
Start: 2023-01-10 | End: 2023-01-10 | Stop reason: SURG

## 2023-01-10 RX ORDER — PROPOFOL 10 MG/ML
VIAL (ML) INTRAVENOUS AS NEEDED
Status: DISCONTINUED | OUTPATIENT
Start: 2023-01-10 | End: 2023-01-10 | Stop reason: SURG

## 2023-01-10 RX ORDER — ONDANSETRON 2 MG/ML
4 INJECTION INTRAMUSCULAR; INTRAVENOUS EVERY 6 HOURS PRN
Status: DISCONTINUED | OUTPATIENT
Start: 2023-01-10 | End: 2023-01-11 | Stop reason: HOSPADM

## 2023-01-10 RX ORDER — SODIUM CHLORIDE 9 MG/ML
40 INJECTION, SOLUTION INTRAVENOUS AS NEEDED
Status: DISCONTINUED | OUTPATIENT
Start: 2023-01-10 | End: 2023-01-10

## 2023-01-10 RX ADMIN — PROPOFOL 60 MG: 10 INJECTION, EMULSION INTRAVENOUS at 16:37

## 2023-01-10 RX ADMIN — Medication 10 ML: at 21:35

## 2023-01-10 RX ADMIN — Medication 10 ML: at 10:11

## 2023-01-10 RX ADMIN — LEVOTHYROXINE SODIUM 25 MCG: 25 TABLET ORAL at 05:38

## 2023-01-10 RX ADMIN — Medication 10 ML: at 08:09

## 2023-01-10 RX ADMIN — DEXTROSE AND SODIUM CHLORIDE 30 ML/HR: 5; 450 INJECTION, SOLUTION INTRAVENOUS at 15:55

## 2023-01-10 RX ADMIN — RANOLAZINE 500 MG: 500 TABLET, FILM COATED, EXTENDED RELEASE ORAL at 20:55

## 2023-01-10 RX ADMIN — HYDROMORPHONE HYDROCHLORIDE 0.25 MG: 1 INJECTION, SOLUTION INTRAMUSCULAR; INTRAVENOUS; SUBCUTANEOUS at 10:11

## 2023-01-10 RX ADMIN — HYDROMORPHONE HYDROCHLORIDE 0.25 MG: 1 INJECTION, SOLUTION INTRAMUSCULAR; INTRAVENOUS; SUBCUTANEOUS at 18:26

## 2023-01-10 RX ADMIN — HYDROMORPHONE HYDROCHLORIDE 0.25 MG: 1 INJECTION, SOLUTION INTRAMUSCULAR; INTRAVENOUS; SUBCUTANEOUS at 23:06

## 2023-01-10 RX ADMIN — ONDANSETRON 4 MG: 2 INJECTION INTRAMUSCULAR; INTRAVENOUS at 23:07

## 2023-01-10 RX ADMIN — INSULIN DETEMIR 35 UNITS: 100 INJECTION, SOLUTION SUBCUTANEOUS at 20:56

## 2023-01-10 RX ADMIN — ONDANSETRON 4 MG: 2 INJECTION INTRAMUSCULAR; INTRAVENOUS at 14:30

## 2023-01-10 RX ADMIN — PROPOFOL 30 MG: 10 INJECTION, EMULSION INTRAVENOUS at 16:40

## 2023-01-10 RX ADMIN — HEPARIN SODIUM 5000 UNITS: 5000 INJECTION INTRAVENOUS; SUBCUTANEOUS at 05:38

## 2023-01-10 RX ADMIN — HEPARIN SODIUM 5000 UNITS: 5000 INJECTION INTRAVENOUS; SUBCUTANEOUS at 21:36

## 2023-01-10 RX ADMIN — LIDOCAINE HYDROCHLORIDE 80 MG: 20 INJECTION, SOLUTION INTRAVENOUS at 16:37

## 2023-01-10 RX ADMIN — HYDROMORPHONE HYDROCHLORIDE 0.25 MG: 1 INJECTION, SOLUTION INTRAMUSCULAR; INTRAVENOUS; SUBCUTANEOUS at 05:38

## 2023-01-10 NOTE — PLAN OF CARE
Goal Outcome Evaluation:  Plan of Care Reviewed With: patient        Progress: no change  Outcome Evaluation: Pt agrees to OT. Pt is Mod I for supine<>sit EOB. Pt reports she already had a bath today.  While sitting EOB, pt performes B UE exercises with 2 lb wt. Pt up to BSC prior to OT tx. All needs within reach. Continue OT POC

## 2023-01-10 NOTE — PLAN OF CARE
Goal Outcome Evaluation:         Pt reports right upper quadrant discomfort and nausea at times. Down for EGD now.

## 2023-01-10 NOTE — ANESTHESIA PREPROCEDURE EVALUATION
Anesthesia Evaluation     Patient summary reviewed and Nursing notes reviewed   no history of anesthetic complications:  NPO Solid Status: > 8 hours  NPO Liquid Status: > 4 hours           Airway   Mallampati: II  TM distance: >3 FB  Neck ROM: full  Possible difficult intubation and Large neck circumference  Comment: General anesthesia 10/16.19, [place] #2, ETT 7.5, Grade 1. General anesthesia 2/26/21, MAC #3, ETT 7.0, Grade IIa.  Dental    (+) upper dentures and lower dentures    Pulmonary - negative pulmonary ROS    breath sounds clear to auscultation  (+) decreased breath sounds,   (-) COPD, asthma, sleep apnea, not a smoker  Cardiovascular - normal exam  Exercise tolerance: good (4-7 METS)    PT is on anticoagulation therapy  Patient on routine beta blocker and Beta blocker given within 24 hours of surgery  Rhythm: regular  Rate: normal    (+) hypertension well controlled, CAD, cardiac stents more than 12 months ago angina with exertion, CHF , hyperlipidemia,  carotid artery disease  (-) valvular problems/murmurs, dysrhythmias, murmur, PVD, DVT    ROS comment:   · Left Ventricle: Estimated EF appears to be in the range of 51 - 55%. Normal left ventricular cavity size noted  · There is moderate eccentric hypertrophy of the left ventricular cavity. Left ventricular diastolic dysfunction is noted (grade II w/high LAP) consistent with pseudonormalization.  · Right Ventricle: Normal right ventricular wall thickness, systolic function and septal motion noted. Right ventricular cavity is borderline dilated  · No evidence of a patent foramen ovale. No evidence of an atrial septal defect present. Saline test results are negative.  · The aortic valve is abnormal with mild calcification of the right coronary cusp.  · Mitral Valve: The mitral valve is abnormal in structure. There is anterior leaflet thickening present. Mild mitral valve regurgitation is present.  · Trace to mild tricuspid valve regurgitation is present.  Estimated right ventricular systolic pressure from tricuspid regurgitation is moderately elevated (45-55 mmHg)  · Mild pulmonary hypertension is present.  · There is no evidence of pericardial effusion.      Neuro/Psych  (+) numbness (both feet), psychiatric history Anxiety and Depression,    (-) seizures, TIA, CVA, headaches  GI/Hepatic/Renal/Endo    (+) morbid obesity, GERD well controlled,  liver disease history of elevated LFT, diabetes mellitus type 2 poorly controlled using insulin, thyroid problem hypothyroidism  (-) hepatitis, no renal disease    Musculoskeletal     Abdominal   (+) obese,    Substance History - negative use     OB/GYN negative ob/gyn ROS         Other   arthritis,      (-) history of cancer                    Anesthesia Plan    ASA 4     general   total IV anesthesia  intravenous induction     Anesthetic plan, risks, benefits, and alternatives have been provided, discussed and informed consent has been obtained with: patient.

## 2023-01-10 NOTE — TELEPHONE ENCOUNTER
We dont suppose to see her until  April so lets do a 6 weeks hospital follow up ----- Message from Niya Hawk sent at 1/10/2023  1:47 PM CST -----  Regarding: FW: 1 month with Dr. Gibson  I have no info on this one  ----- Message -----  From: Valorie Galan RN  Sent: 1/7/2023   1:09 PM CST  To: [facility] Cardiology [facility]  Subject: 1 month with Dr. Gibson                       Please call pt with one month appointment with Dr. Gibson

## 2023-01-10 NOTE — ANESTHESIA POSTPROCEDURE EVALUATION
Patient: Ariana Martinez    Procedure Summary     Date: 01/10/23 Room / Location: [facility] ENDOSCOPY 2 / [facility] ENDOSCOPY    Anesthesia Start: 1632 Anesthesia Stop: 1641    Procedure: ESOPHAGOGASTRODUODENOSCOPY Diagnosis:       Chest pain, unspecified type      Iron deficiency anemia due to chronic blood loss      (Chest pain, unspecified type [R07.9])      (Iron deficiency anemia due to chronic blood loss [D50.0])    Surgeons: Jeremiah Wilkins MD Provider: Matias Valadez CRNA    Anesthesia Type: general ASA Status: 4          Anesthesia Type: general    Vitals  No vitals data found for the desired time range.          Post Anesthesia Care and Evaluation    Patient location during evaluation: PHASE II  Patient participation: complete - patient participated  Level of consciousness: awake and alert  Pain score: 0  Pain management: adequate    Airway patency: patent  Anesthetic complications: No anesthetic complications  PONV Status: none  Cardiovascular status: acceptable  Respiratory status: acceptable  Hydration status: acceptable    Comments:

## 2023-01-10 NOTE — PLAN OF CARE
Problem: Adult Inpatient Plan of Care  Goal: Absence of Hospital-Acquired Illness or Injury  Intervention: Identify and Manage Fall Risk  Recent Flowsheet Documentation  Taken 1/9/2023 1923 by Abhi Pace RN  Safety Promotion/Fall Prevention:   safety round/check completed   nonskid shoes/slippers when out of bed     Problem: Adult Inpatient Plan of Care  Goal: Absence of Hospital-Acquired Illness or Injury  Outcome: Ongoing, Progressing  Intervention: Identify and Manage Fall Risk  Recent Flowsheet Documentation  Taken 1/9/2023 1923 by Abhi Pace RN  Safety Promotion/Fall Prevention:   safety round/check completed   nonskid shoes/slippers when out of bed  Intervention: Prevent Skin Injury  Recent Flowsheet Documentation  Taken 1/9/2023 1923 by Abhi Pace RN  Body Position: position changed independently  Intervention: Prevent and Manage VTE (Venous Thromboembolism) Risk  Recent Flowsheet Documentation  Taken 1/9/2023 1923 by Abhi Pace RN  Activity Management: activity adjusted per tolerance     Problem: Chest Pain  Goal: Resolution of Chest Pain Symptoms  Outcome: Ongoing, Progressing   Goal Outcome Evaluation:

## 2023-01-10 NOTE — THERAPY TREATMENT NOTE
Patient Name: Ariana Martinez  : 1960    MRN: [id number]                              Today's Date: 1/10/2023       Admit Date: 2023    Visit Dx:     ICD-10-CM ICD-9-CM   1. Chest pain, unspecified type  R07.9 786.50   2. Impaired mobility and ADLs  Z74.09 V49.89    Z78.9    3. Iron deficiency anemia due to chronic blood loss  D50.0 280.0   4. Impaired functional mobility, balance, gait, and endurance  Z74.09 V49.89     Patient Active Problem List   Diagnosis   • 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, with long-term current use of insulin   • Closed nondisplaced fracture of fifth left metatarsal bone   • MAURICIO (generalized anxiety disorder)   • Depression, major, recurrent, moderate (HCC)   • GERD without esophagitis   • Long term prescription opiate use   • Mixed hyperlipidemia   • Vitamin D deficiency   • Seasonal allergic rhinitis   • Restrictive lung disease secondary to obesity   • Adult body mass index 37.0-37.9   • Snoring   • Class 3 severe obesity due to excess calories without serious comorbidity with body mass index (BMI) of 40.0 to 44.9 in adult (HCC)   • (HFpEF) heart failure with preserved ejection fraction ([facility])   • Type 2 diabetes mellitus with hyperglycemia, with long-term current use of insulin ([facility])   • Cyanocobalamin deficiency   • Coronary artery disease of native artery of native heart with stable angina pectoris ([facility])   • Hypertension   • Meniere's disease   • Gastroparesis   • Pulmonary hypertension ([facility])   • Pes cavus   • Primary osteoarthritis involving multiple joints   • Generalized anxiety disorder   • Chronic right-sided low back pain with right-sided sciatica   • Chest pain   • Adverse effect of iron   • Chest pain due to myocardial ischemia   • Nonrheumatic tricuspid valve regurgitation   • Iron deficiency anemia due to chronic blood loss   • Malaise and fatigue   • Ankle arthritis   • Urinary retention   • Endocarditis   • Essential  hypertension   • Occlusion and stenosis of bilateral carotid arteries   • S/P BKA (below knee amputation) unilateral, left ([facility])   • Phantom pain after amputation of lower extremity ([facility])   • S/P CABG (coronary artery bypass graft)   • Severe malnutrition ([facility])   • TIA (transient ischemic attack)   • Coronary artery abnormality   • Elevated d-dimer   • Neuropathy   • Chest pain, unspecified type   • Acute pain of right shoulder   • Rotator cuff syndrome, right   • Acquired hypothyroidism   • Bilateral leg edema   • Left elbow pain     Past Medical History:   Diagnosis Date   • Acute bacterial endocarditis 3/13/2021   • Angina, class IV ([facility])    • Anxiety    • Anxiety and depression    • Arthritis    • Benign paroxysmal positional vertigo    • Bladder disorder     has bladder stimulator   • Carpal tunnel syndrome    • CHF (congestive heart failure) ([facility])    • Chronic pain    • Coronary atherosclerosis     hx CABG 2005.  is followed by Dr Kwon   • Depression    • Diabetes mellitus ([facility])     Type 2, controlled   • Diabetic polyneuropathy ([facility])    • Disease of thyroid gland    • Elevated cholesterol    • Female stress incontinence    • Foot pain, left    • Full dentures    • Gastroparesis    • GERD (gastroesophageal reflux disease)    • Hyperlipidemia    • Hypertension    • Low back pain    • Malaise and fatigue    • Multiple joint pain    • Obesity     Refuses to be weighed   • Occlusion and stenosis of bilateral carotid arteries 6/18/2021   • Otalgia     Both   • Perforation of tympanic membrane     Left   • Postoperative wound infection    • Vitamin D deficiency    • Wears glasses     reading     Past Surgical History:   Procedure Laterality Date   • ABDOMINAL SURGERY     • AMPUTATION FOOT     • ANGIOPLASTY      coronary   • ANKLE ARTHROSCOPY Left 2/26/2021    Procedure: Left foot hardware removal, ankle arthroscopy, bone grafting , left foot exostectomy;  Surgeon: Ignacio Lord DPM;  Location: [facility] OR;   Service: Podiatry;  Laterality: Left;   • BREAST BIOPSY Right    • CARDIAC CATHETERIZATION     • CARDIAC CATHETERIZATION N/A 6/20/2017    Procedure: Right Heart Cath;  Surgeon: Can Kwon MD PhD;  Location: [facility] CATH INVASIVE LOCATION;  Service:    • CARDIAC CATHETERIZATION N/A 2/18/2020    Procedure: Left Heart Cath;  Surgeon: Catalina Cooper MD;  Location: [facility] CATH INVASIVE LOCATION;  Service: Cardiology;  Laterality: N/A;   • CARDIAC CATHETERIZATION N/A 4/6/2022    Procedure: Left Heart Cath;  Surgeon: Sheryl Navas MD;  Location: [facility] CATH INVASIVE LOCATION;  Service: Cardiology;  Laterality: N/A;   • CARPAL TUNNEL RELEASE     • CHOLECYSTECTOMY     • COLONOSCOPY N/A 6/24/2020    Procedure: COLONOSCOPY;  Surgeon: Julián Maldonado MD;  Location: [facility] ENDOSCOPY;  Service: Gastroenterology;  Laterality: N/A;   • CORONARY ARTERY BYPASS GRAFT  2005   • ENDOSCOPY N/A 10/19/2018    Procedure: ESOPHAGOGASTRODUODENOSCOPY possible dilation;  Surgeon: Julián Maldonado MD;  Location: [facility] ENDOSCOPY;  Service: Gastroenterology   • ENDOSCOPY N/A 6/24/2020    Procedure: ESOPHAGOGASTRODUODENOSCOPY WED appt please;  Surgeon: Julián Maldonado MD;  Location: [facility] ENDOSCOPY;  Service: Gastroenterology;  Laterality: N/A;   • ENDOSCOPY N/A 6/10/2022    Procedure: ESOPHAGOGASTRODUODENOSCOPY   room 380;  Surgeon: Jeremiah Wilkins MD;  Location: [facility] ENDOSCOPY;  Service: Gastroenterology;  Laterality: N/A;   • ENDOSCOPY AND COLONOSCOPY     • FOOT SURGERY      Toes   • FOOT SURGERY     • GASTRIC BANDING      Revision, laparoscopic   • HYSTERECTOMY     • INCISION AND DRAINAGE LEG Left 3/12/2021    Procedure: Left ankle arthroscopic irrigation and debridement, screw removal;  Surgeon: Ignacio Lord DPM;  Location: [facility] OR;  Service: Podiatry;  Laterality: Left;   • MOUTH SURGERY     • SALPINGO OOPHORECTOMY     • SHOULDER SURGERY     • SUBTALAR ARTHRODESIS Left 1/16/2019    Procedure: LEFT FOOT  HARDWARE REMOVAL, FIFTH METATARSAL , OPEN REDUCTION INTERNAL FIXATION, CALCANEAL OSTEOTOMY;  Surgeon: Ignacio Lord DPM;  Location: [facility];  Service: Podiatry   • SUBTALAR ARTHRODESIS Left 10/16/2019    Procedure: foot hardware removal, subtalar joint fusion  possible de/reattachment of achilles tendon        (c-arm);  Surgeon: Ignacio Lord DPM;  Location: [facility] OR;  Service: Podiatry   • SUBTALAR ARTHRODESIS Left 9/30/2020    Procedure: subtalar, talonavicular joint arthrodesis.  Removal hardware.          (c-arm);  Surgeon: Ignacio Lord DPM;  Location: [facility];  Service: Podiatry;  Laterality: Left;   • TRANSESOPHAGEAL ECHOCARDIOGRAM ([place]AM[place]TE)      With color flow      General Information     Row Name 01/10/23 0802          OT Time and Intention    Document Type therapy note (daily note)  -BB     Mode of Treatment occupational therapy;individual therapy  -BB     Row Name 01/10/23 0802          General Information    Patient Profile Reviewed yes  -BB     Existing Precautions/Restrictions fall  -BB     Row Name 01/10/23 0802          Cognition    Orientation Status (Cognition) oriented x 4  -BB     Row Name 01/10/23 0802          Safety Issues, Functional Mobility    Impairments Affecting Function (Mobility) strength;endurance/activity tolerance;pain  -BB           User Key  (r) = Recorded By, (t) = Taken By, (c) = Cosigned By    Initials Name Provider Type    BB Matilde Rios COTA Occupational Therapist Assistant                 Mobility/ADL's     Row Name 01/10/23 0802          Bed Mobility    Bed Mobility supine-sit;sit-supine  -BB     Supine-Sit [place] (Bed Mobility) modified independence  -BB     Sit-Supine [place] (Bed Mobility) modified independence  -BB     Assistive Device (Bed Mobility) head of bed elevated;bed rails  -BB     Row Name 01/10/23 0802          Transfers    Transfers toilet transfer  -BB     Row Name 01/10/23 0802          Toilet Transfer    Type (Toilet  Transfer) squat pivot  -     Row Name 01/10/23 0802          Activities of Daily Living    BADL Assessment/Intervention grooming  -     Row Name 01/10/23 0802          Grooming Assessment/Training    [place] Level (Grooming) hair care, combing/brushing;wash face, hands;modified independence  -BB     Position (Grooming) edge of bed sitting  -BB           User Key  (r) = Recorded By, (t) = Taken By, (c) = Cosigned By    Initials Name Provider Type    BB Matilde Rios COTA Occupational Therapist Assistant               Obj/Interventions     Row Name 01/10/23 0802          Range of Motion Comprehensive    General Range of Motion bilateral lower extremity ROM WFL  -[facility] Name 01/10/23 0802          Strength Comprehensive (MMT)    General Manual Muscle Testing (MMT) Assessment upper extremity strength deficits identified  -[facility] Name 01/10/23 0802          Shoulder (Therapeutic Exercise)    Shoulder (Therapeutic Exercise) AROM (active range of motion);strengthening exercise  -BB     Shoulder Strengthening (Therapeutic Exercise) bilateral;flexion;extension;2 lb free weight;external rotation;internal rotation;15 repititions;2 sets  chest press  -     Row Name 01/10/23 0802          Elbow/Forearm (Therapeutic Exercise)    Elbow/Forearm (Therapeutic Exercise) AROM (active range of motion);strengthening exercise  -BB     Elbow/Forearm AROM (Therapeutic Exercise) bilateral;flexion;extension;pronation;supination;15 repititions;2 sets  -BB     Elbow/Forearm Strengthening (Therapeutic Exercise) 2 lb free weight  -     Row Name 01/10/23 0802          Wrist (Therapeutic Exercise)    Wrist (Therapeutic Exercise) AROM (active range of motion);strengthening exercise  -BB     Wrist Strengthening (Therapeutic Exercise) bilateral;flexion;extension;2 lb free weight;15 repititions;2 sets  -     Row Name 01/10/23 0802          Motor Skills    Therapeutic Exercise shoulder;elbow/forearm;wrist  -BB            User Key  (r) = Recorded By, (t) = Taken By, (c) = Cosigned By    Initials Name Provider Type    Matilde Childers COTA Occupational Therapist Assistant               Goals/Plan     Row Name 01/10/23 0802          Transfer Goal 1 (OT)    Activity/Assistive Device (Transfer Goal 1, OT) sit-to-stand/stand-to-sit;bed-to-chair/chair-to-bed;commode, bedside without drop arms  -BB     [place] Level/Cues Needed (Transfer Goal 1, OT) modified independence  -BB     Time Frame (Transfer Goal 1, OT) long term goal (LTG);by discharge  -BB     Progress/Outcome (Transfer Goal 1, OT) goal not met  -BB     Row Name 01/10/23 0802          Bathing Goal 1 (OT)    Activity/Device (Bathing Goal 1, OT) lower body bathing  -BB     [place] Level/Cues Needed (Bathing Goal 1, OT) independent  -BB     Time Frame (Bathing Goal 1, OT) long term goal (LTG);by discharge  -BB     Progress/Outcomes (Bathing Goal 1, OT) goal not met  -BB     Row Name 01/10/23 0802          Dressing Goal 1 (OT)    Activity/Device (Dressing Goal 1, OT) lower body dressing  -BB     [place]/Cues Needed (Dressing Goal 1, OT) independent  -BB     Time Frame (Dressing Goal 1, OT) long term goal (LTG);by discharge  -BB     Strategies/Barriers (Dressing Goal 1, OT) To don prosthetic correctly  -BB     Row Name 01/10/23 0802          Toileting Goal 1 (OT)    Activity/Device (Toileting Goal 1, OT) toileting skills, all  -BB     [place] Level/Cues Needed (Toileting Goal 1, OT) independent  -BB     Time Frame (Toileting Goal 1, OT) long term goal (LTG);by discharge  -BB     Progress/Outcome (Toileting Goal 1, OT) goal not met  -BB           User Key  (r) = Recorded By, (t) = Taken By, (c) = Cosigned By    Initials Name Provider Type    Matilde Childers COTA Occupational Therapist Assistant               Clinical Impression     Row Name 01/10/23 0802          Pain Assessment    Pretreatment Pain Rating 7/10  -BB     Posttreatment Pain Rating 8/10   -BB     Pain Location - flank;back  -BB     Pain Intervention(s) Repositioned;Other (Comment)  nsg notified  -BB     Row Name 01/10/23 0802          Plan of Care Review    Plan of Care Reviewed With patient  -BB     Progress no change  -BB     Outcome Evaluation Pt agrees to OT. Pt is Mod I for supine<>sit EOB. Pt reports she already had a bath today.  While sitting EOB, pt performes B UE exercises with 2 lb wt. Pt up to BSC prior to OT tx. All needs within reach. Continue OT POC  -BB     Row Name 01/10/23 0802          Therapy Assessment/Plan (OT)    Rehab Potential (OT) good, to achieve stated therapy goals  -BB     Criteria for Skilled Therapeutic Interventions Met (OT) yes;meets criteria;skilled treatment is necessary  -BB     Therapy Frequency (OT) other (see comments)  5-7 d/wk  -BB     Row Name 01/10/23 0802          Therapy Plan Review/Discharge Plan (OT)    Anticipated Discharge Disposition (OT) home with assist  -BB     Row Name 01/10/23 0802          Vital Signs    Pre Systolic BP Rehab 118  -BB     Pre Treatment Diastolic BP 76  -BB     Pretreatment Heart Rate (beats/min) 83  -BB     Pre SpO2 (%) 93  -BB     Pre Patient Position Sitting  -BB     Row Name 01/10/23 0802          Positioning and Restraints    Pre-Treatment Position in bed  -BB     Post Treatment Position bed  -BB     In Bed fowlers;call light within reach;encouraged to call for assist;exit alarm on  -BB           User Key  (r) = Recorded By, (t) = Taken By, (c) = Cosigned By    Initials Name Provider Type    BB Matilde Rios COTA Occupational Therapist Assistant               Outcome Measures     Row Name 01/10/23 0802          How much help from another is currently needed...    Putting on and taking off regular lower body clothing? 3  -BB     Bathing (including washing, rinsing, and drying) 3  -BB     Toileting (which includes using toilet bed pan or urinal) 3  -BB     Putting on and taking off regular upper body clothing 4  -BB      Taking care of personal grooming (such as brushing teeth) 4  -BB     Eating meals 4  -BB     AM-PAC 6 Clicks Score (OT) 21  -BB     Row Name 01/10/23 0800          How much help from another person do you currently need...    Turning from your back to your side while in flat bed without using bedrails? 4  -FH     Moving from lying on back to sitting on the side of a flat bed without bedrails? 4  -FH     Moving to and from a bed to a chair (including a wheelchair)? 3  -FH     Standing up from a chair using your arms (e.g., wheelchair, bedside chair)? 3  -FH     Climbing 3-5 steps with a railing? 3  -FH     To walk in hospital room? 3  -FH     AM-PAC 6 Clicks Score (PT) 20  -FH     Highest level of mobility 6 --> Walked 10 steps or more  -           User Key  (r) = Recorded By, (t) = Taken By, (c) = Cosigned By    Initials Name Provider Type     Codie Arnold, RN Registered Nurse    Matilde Childers COTA Occupational Therapist Assistant                Occupational Therapy Education     Title: PT OT SLP Therapies (In Progress)     Topic: Occupational Therapy (In Progress)     Point: ADL training (Done)     Description:   Instruct learner(s) on proper safety adaptation and remediation techniques during self care or transfers.   Instruct in proper use of assistive devices.              Learning Progress Summary           Patient Acceptance, E,TB, VU by NICOLE at 1/10/2023 1400    Acceptance, E,TB, VU by  at 1/9/2023 1436    Comment: OT POC, Role of OT, transfer training                   Point: Home exercise program (Not Started)     Description:   Instruct learner(s) on appropriate technique for monitoring, assisting and/or progressing therapeutic exercises/activities.              Learner Progress:  Not documented in this visit.          Point: Precautions (Done)     Description:   Instruct learner(s) on prescribed precautions during self-care and functional transfers.              Learning Progress  Summary           Patient Acceptance, E,TB, VU by  at 1/9/2023 1436    Comment: OT POC, Role of OT, transfer training                   Point: Body mechanics (Done)     Description:   Instruct learner(s) on proper positioning and spine alignment during self-care, functional mobility activities and/or exercises.              Learning Progress Summary           Patient Acceptance, E,TB, VU by  at 1/10/2023 1400    Acceptance, E,TB, VU by  at 1/9/2023 1436    Comment: OT POC, Role of OT, transfer training                               User Key     Initials Effective Dates Name Provider Type Discipline     06/16/21 -  Matilde Rios COTA Occupational Therapist Assistant OT     08/11/22 -  Jessica Garnica OT Occupational Therapist OT              OT Recommendation and Plan  Therapy Frequency (OT): other (see comments) (5-7 d/wk)  Plan of Care Review  Plan of Care Reviewed With: patient  Progress: no change  Outcome Evaluation: Pt agrees to OT. Pt is Mod I for supine<>sit EOB. Pt reports she already had a bath today.  While sitting EOB, pt performes B UE exercises with 2 lb wt. Pt up to BSC prior to OT tx. All needs within reach. Continue OT POC     Time Calculation:    Time Calculation- OT     Row Name 01/10/23 1400             Time Calculation- OT    OT Start Time 0802  -BB      OT Stop Time 0845  -BB      OT Time Calculation (min) 43 min  -BB      Total Timed Code Minutes- OT 43 minute(s)  -BB      OT Received On 01/10/23  -BB         Timed Charges    55634 - OT Therapeutic Exercise Minutes 28  -BB      08838 - OT Self Care/Mgmt Minutes 15  -BB         Total Minutes    Timed Charges Total Minutes 43  -BB       Total Minutes 43  -BB            User Key  (r) = Recorded By, (t) = Taken By, (c) = Cosigned By    Initials Name Provider Type    BB Matilde Rios COTA Occupational Therapist Assistant              Therapy Charges for Today     Code Description Service Date Service Provider Modifiers Qty     63334907753 HC OT THER PROC EA 15 MIN 1/10/2023 Matilde Rios COTA GO 2    17377689081 HC OT SELF CARE/MGMT/TRAIN EA 15 MIN 1/10/2023 Matilde Rios COTA GO 1               LINDA Henley  1/10/2023

## 2023-01-10 NOTE — PROGRESS NOTES
[facility] Medicine   INPATIENT PROGRESS NOTE      Patient Name: Ariana Martinez  Date of Admission: 2023  Today's Date: 01/10/23  Length of Stay: 0  Primary Care Physician: Dolly Foss APRN    Subjective   Chief Complaint and HPI:  The patient is a 63 y.o. female who presented to the emergency department on 2023 with chief complaint of chest pain.  She described the chest pain as midepigastric, radiating around her right lower rib cage/upper abdomen to her back.  She deniesd associated radiation.  She complained of mild associated nausea but no vomiting.  Also felt short of breath at times.  She was laying in bed when the pain started.  The pain was continuous, did not resolve until she received 1 sublingual nitroglycerin and Dilaudid here in the emergency department.  She has a long history of cardiac issues including history of CABG, history of PCI, CHF.  Most recent left heart cath in 2022 showing the followin.  One-vessel obstructive coronary artery disease involving chronic total occlusion of the LAD.  LIMA to LAD is patent, target vessel is small in caliber with severe diffuse disease but not amenable to PCI  2.  Patent stents in the LAD extending into the diagonal  3.  Mild disease in the RCA and left circumflex which is unchanged from before  4.  Normal left-sided filling pressures     She denied dark or tarry sticky stools, hematemesis, vomiting.     She was evaluated by cardiology who felt that the pain was more likely to be GI related given her history of gastroparesis.    Patient's pain persist.  Looking forward to her endoscopy later today.      Review of Systems   Review of Systems as of 01/10/23   Constitutional: Negative.    HENT: Negative.    Eyes: Negative.    Respiratory: Negative.    Cardiovascular: Negative.    Gastrointestinal: Positive for abdominal pain  Endocrine: Negative.    Genitourinary: Negative.     Musculoskeletal: Negative.    Skin: Negative.    Neurological: Negative.    Psychiatric/Behavioral: Negative.      All pertinent negatives and positives are as above. All other systems have been reviewed and are negative unless otherwise stated.     Objective    Temp:  [96.8 °F (36 °C)-97.7 °F (36.5 °C)] 97.7 °F (36.5 °C)  Heart Rate:  [66-93] 93  Resp:  [18-20] 18  BP: ()/(48-75) 115/75  Physical Exam    Vitals and nursing note reviewed.   Constitutional:       General: No acute distress.     Appearance: Normal appearance.   HENT:      Head: Normocephalic and atraumatic.      Mouth: Mucous membranes are moist.   Eyes:      Conjunctivae/sclerae: Conjunctivae normal.      Pupils: Pupils are equal, round, and reactive to light.   Cardiovascular:      Rate and Rhythm: Normal rate and regular rhythm.   Pulmonary:      Effort: Pulmonary effort is normal.      Breath sounds: Normal breath sounds.   Abdominal:      General: Abdomen is flat.      Palpations: Abdomen is soft.      Tenderness: Mild midepigastric and right upper quadrant tenderness that rebound or guarding  Musculoskeletal:         General: No signs of injury. Normal range of motion.   Skin:     General: Skin is warm and dry.   Neurological:      General: No focal deficit present.      Mental Status: Alert and oriented  Psychiatric:         Mood and Affect: Mood normal.         Behavior: Behavior normal.         Results Review:  I have reviewed the labs, radiology results, and diagnostic studies.    Laboratory Data:   Results from last 7 days   Lab Units 01/09/23  0740 01/08/23  0607 01/07/23  0653   WBC 10*3/mm3 7.24 9.27 9.28   HEMOGLOBIN g/dL 13.5 13.2 12.2   HEMATOCRIT % 41.1 40.8 37.5   PLATELETS 10*3/mm3 279 401 373        Results from last 7 days   Lab Units 01/09/23  0920 01/08/23  0607 01/07/23  0653 01/06/23  1051   SODIUM mmol/L 136 139 139 139   POTASSIUM mmol/L 3.9 3.5 3.6 3.6   CHLORIDE mmol/L 101 100 101 100   CO2 mmol/L 29.0 28.0 28.0  30.0*   BUN mg/dL 10 14 12 13   CREATININE mg/dL 0.99 1.10* 0.97 1.02*   CALCIUM mg/dL 9.1 9.4 9.2 9.6   BILIRUBIN mg/dL  --   --   --  0.3   ALK PHOS U/L  --   --   --  120*   ALT (SGPT) U/L  --   --   --  18   AST (SGOT) U/L  --   --   --  14   GLUCOSE mg/dL 106* 111* 212* 204*       Culture Data:   No results found for: BLOODCX  No results found for: URINECX  No results found for: RESPCX  No results found for: WOUNDCX  No results found for: STOOLCX  No components found for: BODYFLD    Radiology Data:   Imaging Results (Last 24 Hours)     ** No results found for the last 24 hours. **          I have reviewed the patient's current medications.     Assessment/Plan     .MIPSCURRENTMEDS    Active Hospital Problems    Diagnosis    • Chest pain, unspecified type    • Iron deficiency anemia due to chronic blood loss    • Gastroparesis        Chest pain, history of CABG and PCI  -Somewhat atypical chest pain to midepigastric/right upper quadrant/right lower chest wall pain, resolved with 1 nitroglycerin  - Long history of coronary artery disease with history of CABG and PCI  - Sees Dr. Snowden outpatient, will follow closely outpatient     Abdominal pain  - Continued pain right upper quadrant/right flank.  - UA negative  - CT abdomen showed mild pneumobilia but otherwise unremarkable  - GI consulted, endoscopy later today    Peptic ulcer disease, gastroparesis  -Protonix     Type 2 diabetes  -Sliding scale     Hypertension  Hyperlipidemia  Anxiety  Depression  GERD  Obesity  Ménière's    Discharge Planning: I expect the patient to be discharged in 1 to 2 days      Copied text in this note has been reviewed and is accurate as of 1/10/2023       Electronically signed by Marleny Montoya PA-C, 01/10/23, 09:38 CST.

## 2023-01-11 ENCOUNTER — READMISSION MANAGEMENT (OUTPATIENT)
Dept: CALL CENTER | Facility: HOSPITAL | Age: 63
End: 2023-01-11
Payer: COMMERCIAL

## 2023-01-11 VITALS
WEIGHT: 268.96 LBS | HEIGHT: 68 IN | RESPIRATION RATE: 18 BRPM | DIASTOLIC BLOOD PRESSURE: 65 MMHG | HEART RATE: 72 BPM | BODY MASS INDEX: 40.76 KG/M2 | TEMPERATURE: 97.3 F | SYSTOLIC BLOOD PRESSURE: 140 MMHG | OXYGEN SATURATION: 96 %

## 2023-01-11 DIAGNOSIS — M54.41 CHRONIC RIGHT-SIDED LOW BACK PAIN WITH RIGHT-SIDED SCIATICA: ICD-10-CM

## 2023-01-11 DIAGNOSIS — G54.6 PHANTOM PAIN AFTER AMPUTATION OF LOWER EXTREMITY: Chronic | ICD-10-CM

## 2023-01-11 DIAGNOSIS — G89.29 CHRONIC RIGHT-SIDED LOW BACK PAIN WITH RIGHT-SIDED SCIATICA: ICD-10-CM

## 2023-01-11 PROBLEM — K29.70 GASTRITIS: Status: ACTIVE | Noted: 2023-01-11

## 2023-01-11 LAB
GLUCOSE BLDC GLUCOMTR-MCNC: 121 MG/DL (ref 70–130)
GLUCOSE BLDC GLUCOMTR-MCNC: 205 MG/DL (ref 70–130)

## 2023-01-11 PROCEDURE — 82962 GLUCOSE BLOOD TEST: CPT

## 2023-01-11 PROCEDURE — 63710000001 ONDANSETRON PER 8 MG: Performed by: INTERNAL MEDICINE

## 2023-01-11 PROCEDURE — 63710000001 INSULIN ASPART PER 5 UNITS: Performed by: INTERNAL MEDICINE

## 2023-01-11 PROCEDURE — 97110 THERAPEUTIC EXERCISES: CPT

## 2023-01-11 PROCEDURE — G0378 HOSPITAL OBSERVATION PER HR: HCPCS

## 2023-01-11 PROCEDURE — 25010000002 ONDANSETRON PER 1 MG: Performed by: INTERNAL MEDICINE

## 2023-01-11 PROCEDURE — 25010000002 HYDROMORPHONE 1 MG/ML SOLUTION: Performed by: INTERNAL MEDICINE

## 2023-01-11 PROCEDURE — 25010000002 HEPARIN (PORCINE) PER 1000 UNITS: Performed by: INTERNAL MEDICINE

## 2023-01-11 PROCEDURE — 63710000001 INSULIN DETEMIR PER 5 UNITS: Performed by: INTERNAL MEDICINE

## 2023-01-11 RX ORDER — SUCRALFATE 1 G/1
1 TABLET ORAL 4 TIMES DAILY
Qty: 40 TABLET | Refills: 0 | Status: SHIPPED | OUTPATIENT
Start: 2023-01-11

## 2023-01-11 RX ADMIN — CLOPIDOGREL BISULFATE 75 MG: 75 TABLET ORAL at 08:26

## 2023-01-11 RX ADMIN — HYDROCODONE BITARTRATE AND ACETAMINOPHEN 1 TABLET: 7.5; 325 TABLET ORAL at 11:45

## 2023-01-11 RX ADMIN — INSULIN ASPART 3 UNITS: 100 INJECTION, SOLUTION INTRAVENOUS; SUBCUTANEOUS at 08:29

## 2023-01-11 RX ADMIN — INSULIN ASPART 5 UNITS: 100 INJECTION, SOLUTION INTRAVENOUS; SUBCUTANEOUS at 11:45

## 2023-01-11 RX ADMIN — HYDROCHLOROTHIAZIDE 12.5 MG: 12.5 TABLET ORAL at 08:42

## 2023-01-11 RX ADMIN — ISOSORBIDE MONONITRATE 30 MG: 30 TABLET, EXTENDED RELEASE ORAL at 08:26

## 2023-01-11 RX ADMIN — LEVOTHYROXINE SODIUM 25 MCG: 25 TABLET ORAL at 05:13

## 2023-01-11 RX ADMIN — MELOXICAM 15 MG: 15 TABLET ORAL at 08:26

## 2023-01-11 RX ADMIN — VENLAFAXINE HYDROCHLORIDE 75 MG: 75 CAPSULE, EXTENDED RELEASE ORAL at 08:26

## 2023-01-11 RX ADMIN — HYDROMORPHONE HYDROCHLORIDE 0.25 MG: 1 INJECTION, SOLUTION INTRAMUSCULAR; INTRAVENOUS; SUBCUTANEOUS at 05:13

## 2023-01-11 RX ADMIN — INSULIN DETEMIR 40 UNITS: 100 INJECTION, SOLUTION SUBCUTANEOUS at 08:28

## 2023-01-11 RX ADMIN — ARIPIPRAZOLE 5 MG: 5 TABLET ORAL at 08:26

## 2023-01-11 RX ADMIN — AMLODIPINE BESYLATE 5 MG: 5 TABLET ORAL at 08:26

## 2023-01-11 RX ADMIN — HEPARIN SODIUM 5000 UNITS: 5000 INJECTION INTRAVENOUS; SUBCUTANEOUS at 05:14

## 2023-01-11 RX ADMIN — ONDANSETRON HYDROCHLORIDE 8 MG: 4 TABLET, FILM COATED ORAL at 11:45

## 2023-01-11 RX ADMIN — Medication 10 ML: at 08:28

## 2023-01-11 RX ADMIN — METOPROLOL SUCCINATE 50 MG: 50 TABLET, EXTENDED RELEASE ORAL at 08:26

## 2023-01-11 RX ADMIN — RANOLAZINE 500 MG: 500 TABLET, FILM COATED, EXTENDED RELEASE ORAL at 08:26

## 2023-01-11 RX ADMIN — ASPIRIN 325 MG: 325 TABLET, COATED ORAL at 08:26

## 2023-01-11 RX ADMIN — ATORVASTATIN CALCIUM 80 MG: 40 TABLET, FILM COATED ORAL at 08:26

## 2023-01-11 RX ADMIN — ONDANSETRON 4 MG: 2 INJECTION INTRAMUSCULAR; INTRAVENOUS at 05:14

## 2023-01-11 RX ADMIN — PANTOPRAZOLE SODIUM 40 MG: 40 TABLET, DELAYED RELEASE ORAL at 08:26

## 2023-01-11 RX ADMIN — FOLIC ACID 1000 MCG: 1 TABLET ORAL at 08:26

## 2023-01-11 RX ADMIN — FUROSEMIDE 40 MG: 40 TABLET ORAL at 08:26

## 2023-01-11 NOTE — THERAPY TREATMENT NOTE
Patient Name: Ariana Martinez  : 1960    MRN: [id number]                              Today's Date: 2023       Admit Date: 2023    Visit Dx:     ICD-10-CM ICD-9-CM   1. Chest pain, unspecified type  R07.9 786.50   2. Impaired mobility and ADLs  Z74.09 V49.89    Z78.9    3. Iron deficiency anemia due to chronic blood loss  D50.0 280.0   4. Impaired functional mobility, balance, gait, and endurance  Z74.09 V49.89     Patient Active Problem List   Diagnosis   • 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, with long-term current use of insulin   • Closed nondisplaced fracture of fifth left metatarsal bone   • MAURICIO (generalized anxiety disorder)   • Depression, major, recurrent, moderate (HCC)   • GERD without esophagitis   • Long term prescription opiate use   • Mixed hyperlipidemia   • Vitamin D deficiency   • Seasonal allergic rhinitis   • Restrictive lung disease secondary to obesity   • Adult body mass index 37.0-37.9   • Snoring   • Class 3 severe obesity due to excess calories without serious comorbidity with body mass index (BMI) of 40.0 to 44.9 in adult (HCC)   • (HFpEF) heart failure with preserved ejection fraction ([facility])   • Type 2 diabetes mellitus with hyperglycemia, with long-term current use of insulin ([facility])   • Cyanocobalamin deficiency   • Coronary artery disease of native artery of native heart with stable angina pectoris ([facility])   • Hypertension   • Meniere's disease   • Gastroparesis   • Pulmonary hypertension ([facility])   • Pes cavus   • Primary osteoarthritis involving multiple joints   • Generalized anxiety disorder   • Chronic right-sided low back pain with right-sided sciatica   • Chest pain   • Adverse effect of iron   • Chest pain due to myocardial ischemia   • Nonrheumatic tricuspid valve regurgitation   • Iron deficiency anemia due to chronic blood loss   • Malaise and fatigue   • Ankle arthritis   • Urinary retention   • Endocarditis   • Essential  hypertension   • Occlusion and stenosis of bilateral carotid arteries   • S/P BKA (below knee amputation) unilateral, left ([facility])   • Phantom pain after amputation of lower extremity ([facility])   • S/P CABG (coronary artery bypass graft)   • Severe malnutrition ([facility])   • TIA (transient ischemic attack)   • Coronary artery abnormality   • Elevated d-dimer   • Neuropathy   • Chest pain, unspecified type   • Acute pain of right shoulder   • Rotator cuff syndrome, right   • Acquired hypothyroidism   • Bilateral leg edema   • Left elbow pain   • Gastritis     Past Medical History:   Diagnosis Date   • Acute bacterial endocarditis 3/13/2021   • Angina, class IV ([facility])    • Anxiety    • Anxiety and depression    • Arthritis    • Benign paroxysmal positional vertigo    • Bladder disorder     has bladder stimulator   • Carpal tunnel syndrome    • CHF (congestive heart failure) ([facility])    • Chronic pain    • Coronary atherosclerosis     hx CABG 2005.  is followed by Dr Kwon   • Depression    • Diabetes mellitus ([facility])     Type 2, controlled   • Diabetic polyneuropathy ([facility])    • Disease of thyroid gland    • Elevated cholesterol    • Female stress incontinence    • Foot pain, left    • Full dentures    • Gastroparesis    • GERD (gastroesophageal reflux disease)    • Hyperlipidemia    • Hypertension    • Low back pain    • Malaise and fatigue    • Multiple joint pain    • Obesity     Refuses to be weighed   • Occlusion and stenosis of bilateral carotid arteries 6/18/2021   • Otalgia     Both   • Perforation of tympanic membrane     Left   • Postoperative wound infection    • Vitamin D deficiency    • Wears glasses     reading     Past Surgical History:   Procedure Laterality Date   • ABDOMINAL SURGERY     • AMPUTATION FOOT     • ANGIOPLASTY      coronary   • ANKLE ARTHROSCOPY Left 02/26/2021    Procedure: Left foot hardware removal, ankle arthroscopy, bone grafting , left foot exostectomy;  Surgeon: Ignacio Lord DPM;   Location: [facility] OR;  Service: Podiatry;  Laterality: Left;   • BREAST BIOPSY Right    • CARDIAC CATHETERIZATION     • CARDIAC CATHETERIZATION N/A 06/20/2017    Procedure: Right Heart Cath;  Surgeon: Can Kwon MD PhD;  Location: [facility] CATH INVASIVE LOCATION;  Service:    • CARDIAC CATHETERIZATION N/A 02/18/2020    Procedure: Left Heart Cath;  Surgeon: Catalina Cooper MD;  Location: [facility] CATH INVASIVE LOCATION;  Service: Cardiology;  Laterality: N/A;   • CARDIAC CATHETERIZATION N/A 04/06/2022    Procedure: Left Heart Cath;  Surgeon: Sheryl Navas MD;  Location: [facility] CATH INVASIVE LOCATION;  Service: Cardiology;  Laterality: N/A;   • CARPAL TUNNEL RELEASE     • CHOLECYSTECTOMY     • COLONOSCOPY N/A 06/24/2020    Procedure: COLONOSCOPY;  Surgeon: Julián Maldonado MD;  Location: [facility] ENDOSCOPY;  Service: Gastroenterology;  Laterality: N/A;   • CORONARY ARTERY BYPASS GRAFT  2005   • ENDOSCOPY N/A 10/19/2018    Procedure: ESOPHAGOGASTRODUODENOSCOPY possible dilation;  Surgeon: Julián Maldonado MD;  Location: [facility] ENDOSCOPY;  Service: Gastroenterology   • ENDOSCOPY N/A 06/24/2020    Procedure: ESOPHAGOGASTRODUODENOSCOPY WED appt please;  Surgeon: Julián Maldonado MD;  Location: [facility] ENDOSCOPY;  Service: Gastroenterology;  Laterality: N/A;   • ENDOSCOPY N/A 06/10/2022    Procedure: ESOPHAGOGASTRODUODENOSCOPY   room 380;  Surgeon: Jeremiah Wilkins MD;  Location: [facility] ENDOSCOPY;  Service: Gastroenterology;  Laterality: N/A;   • ENDOSCOPY AND COLONOSCOPY     • FOOT SURGERY      Toes   • FOOT SURGERY     • GASTRIC BANDING      Revision, laparoscopic   • HYSTERECTOMY     • INCISION AND DRAINAGE LEG Left 03/12/2021    Procedure: Left ankle arthroscopic irrigation and debridement, screw removal;  Surgeon: Ignacio Lord DPM;  Location: [facility] OR;  Service: Podiatry;  Laterality: Left;   • MOUTH SURGERY     • SALPINGO OOPHORECTOMY     • SHOULDER SURGERY     • SUBTALAR ARTHRODESIS Left  01/16/2019    Procedure: LEFT FOOT HARDWARE REMOVAL, FIFTH METATARSAL , OPEN REDUCTION INTERNAL FIXATION, CALCANEAL OSTEOTOMY;  Surgeon: Ignacio Lord DPM;  Location: [facility];  Service: Podiatry   • SUBTALAR ARTHRODESIS Left 10/16/2019    Procedure: foot hardware removal, subtalar joint fusion  possible de/reattachment of achilles tendon        (c-arm);  Surgeon: Ignacio Lord DPM;  Location: [facility] OR;  Service: Podiatry   • SUBTALAR ARTHRODESIS Left 09/30/2020    Procedure: subtalar, talonavicular joint arthrodesis.  Removal hardware.          (c-arm);  Surgeon: Ignacio Lord DPM;  Location: [facility] OR;  Service: Podiatry;  Laterality: Left;   • TRANSESOPHAGEAL ECHOCARDIOGRAM (LAMONTE)      With color flow      General Information     Row Name 01/11/23 1052          OT Time and Intention    Document Type therapy note (daily note)  -BB     Mode of Treatment occupational therapy;individual therapy  -     Row Name 01/11/23 1052          General Information    Patient Profile Reviewed yes  -BB     Existing Precautions/Restrictions fall  -BB     Row Name 01/11/23 1052          Cognition    Orientation Status (Cognition) oriented x 4  -BB     Row Name 01/11/23 1052          Safety Issues, Functional Mobility    Impairments Affecting Function (Mobility) strength;endurance/activity tolerance;pain  -BB           User Key  (r) = Recorded By, (t) = Taken By, (c) = Cosigned By    Initials Name Provider Type    BB Matilde Rios COTA Occupational Therapist Assistant                 Mobility/ADL's     Row Name 01/11/23 1052          Bed Mobility    Bed Mobility supine-sit;sit-supine  -BB     Assistive Device (Bed Mobility) head of bed elevated;bed rails  -BB     Row Name 01/11/23 1052          Transfers    Transfers toilet transfer  -BB     Row Name 01/11/23 1052          Toilet Transfer    Type (Toilet Transfer) squat pivot  -BB           User Key  (r) = Recorded By, (t) = Taken By, (c) = Cosigned By     Initials Name Provider Type    Matilde Childers COTA Occupational Therapist Assistant               Obj/Interventions     [facility] Name 01/11/23 1052          Range of Motion Comprehensive    General Range of Motion bilateral lower extremity ROM WFL  -[facility] Name 01/11/23 1052          Strength Comprehensive (MMT)    General Manual Muscle Testing (MMT) Assessment upper extremity strength deficits identified  -[facility] Name 01/11/23 1052          Shoulder (Therapeutic Exercise)    Shoulder AROM (Therapeutic Exercise) bilateral;flexion;extension;internal rotation;external rotation;20 repititions  chest press  -BB     Shoulder Strengthening (Therapeutic Exercise) 2 lb free weight  -[facility] Name 01/11/23 1052          Elbow/Forearm (Therapeutic Exercise)    Elbow/Forearm Strengthening (Therapeutic Exercise) flexion;bilateral;extension;supination;pronation;2 lb free weight;20 repititions  -[facility] Name 01/11/23 1052          Wrist (Therapeutic Exercise)    Wrist Strengthening (Therapeutic Exercise) bilateral;flexion;extension;2 lb free weight;20 repititions  -           User Key  (r) = Recorded By, (t) = Taken By, (c) = Cosigned By    Initials Name Provider Type    Matilde Childers COTA Occupational Therapist Assistant               Goals/Plan     [facility] Name 01/11/23 1052          Transfer Goal 1 (OT)    Activity/Assistive Device (Transfer Goal 1, OT) sit-to-stand/stand-to-sit;bed-to-chair/chair-to-bed;commode, bedside without drop arms  -BB     [place] Level/Cues Needed (Transfer Goal 1, OT) modified independence  -BB     Time Frame (Transfer Goal 1, OT) long term goal (LTG);by discharge  -BB     Progress/Outcome (Transfer Goal 1, OT) goal not met  -[facility] Name 01/11/23 1052          Bathing Goal 1 (OT)    Activity/Device (Bathing Goal 1, OT) lower body bathing  -BB     [place] Level/Cues Needed (Bathing Goal 1, OT) independent  -BB     Time Frame (Bathing Goal 1, OT) long term goal  (LTG);by discharge  -BB     Progress/Outcomes (Bathing Goal 1, OT) goal not met  -BB     Row Name 01/11/23 1052          Dressing Goal 1 (OT)    Activity/Device (Dressing Goal 1, OT) lower body dressing  -BB     [place]/Cues Needed (Dressing Goal 1, OT) independent  -BB     Time Frame (Dressing Goal 1, OT) long term goal (LTG);by discharge  -BB     Strategies/Barriers (Dressing Goal 1, OT) To don prosthetic correctly  -BB     Row Name 01/11/23 1052          Toileting Goal 1 (OT)    Activity/Device (Toileting Goal 1, OT) toileting skills, all  -BB     [place] Level/Cues Needed (Toileting Goal 1, OT) independent  -BB     Time Frame (Toileting Goal 1, OT) long term goal (LTG);by discharge  -BB     Progress/Outcome (Toileting Goal 1, OT) goal not met  -BB           User Key  (r) = Recorded By, (t) = Taken By, (c) = Cosigned By    Initials Name Provider Type    BB Matilde Rios COTA Occupational Therapist Assistant               Clinical Impression     Row Name 01/11/23 1052          Pain Assessment    Pretreatment Pain Rating 9/10  -BB     Posttreatment Pain Rating 9/10  -BB     Pain Location - Side/Orientation Right  -BB     Pain Location - flank;back  -BB     Pain Intervention(s) Medication (See MAR)  -BB     Row Name 01/11/23 1052          Plan of Care Review    Plan of Care Reviewed With patient  -BB     Progress no change  -BB     Outcome Evaluation Pt defers EOB at thistime due to pain. Pt performed B UE exercises with 2 lb wt with fair tolerance. Pt reports she already had a bath today, Continue OT POC  -BB     Row Name 01/11/23 1052          Therapy Assessment/Plan (OT)    Rehab Potential (OT) good, to achieve stated therapy goals  -BB     Criteria for Skilled Therapeutic Interventions Met (OT) yes;meets criteria;skilled treatment is necessary  -BB     Therapy Frequency (OT) other (see comments)  5-7 d/wk  -BB     Row Name 01/11/23 1052          Therapy Plan Review/Discharge Plan (OT)     Anticipated Discharge Disposition (OT) home with assist  -BB     Row Name 01/11/23 1052          Vital Signs    Pretreatment Heart Rate (beats/min) 78  -BB     Pre SpO2 (%) 93  -BB     O2 Delivery Pre Treatment supplemental O2  -BB     Pre Patient Position Supine  -BB     Row Name 01/11/23 1052          Positioning and Restraints    Pre-Treatment Position in bed  -BB     Post Treatment Position bed  -BB     In Bed fowlers;call light within reach;encouraged to call for assist;exit alarm on  -BB           User Key  (r) = Recorded By, (t) = Taken By, (c) = Cosigned By    Initials Name Provider Type    Matilde Childers COTA Occupational Therapist Assistant               Outcome Measures     Row Name 01/11/23 1052          How much help from another is currently needed...    Putting on and taking off regular lower body clothing? 3  -BB     Bathing (including washing, rinsing, and drying) 3  -BB     Toileting (which includes using toilet bed pan or urinal) 3  -BB     Putting on and taking off regular upper body clothing 4  -BB     Taking care of personal grooming (such as brushing teeth) 4  -BB     Eating meals 4  -BB     AM-PAC 6 Clicks Score (OT) 21  -BB     Row Name 01/11/23 0745          How much help from another person do you currently need...    Turning from your back to your side while in flat bed without using bedrails? 4  -CB     Moving from lying on back to sitting on the side of a flat bed without bedrails? 4  -CB     Moving to and from a bed to a chair (including a wheelchair)? 3  -CB     Standing up from a chair using your arms (e.g., wheelchair, bedside chair)? 3  -CB     Climbing 3-5 steps with a railing? 3  -CB     To walk in hospital room? 3  -CB     AM-PAC 6 Clicks Score (PT) 20  -CB     Highest level of mobility 6 --> Walked 10 steps or more  -CB           User Key  (r) = Recorded By, (t) = Taken By, (c) = Cosigned By    Initials Name Provider Type    Carloz Santillan, RN Registered Nurse     Matilde Childers COTA Occupational Therapist Assistant                Occupational Therapy Education     Title: PT OT SLP Therapies (Done)     Topic: Occupational Therapy (Done)     Point: ADL training (Done)     Description:   Instruct learner(s) on proper safety adaptation and remediation techniques during self care or transfers.   Instruct in proper use of assistive devices.              Learning Progress Summary           Patient Acceptance, E,TB, VU by NICOLE at 1/10/2023 1400    Acceptance, E,TB, VU by  at 1/9/2023 1436    Comment: OT POC, Role of OT, transfer training                   Point: Home exercise program (Done)     Description:   Instruct learner(s) on appropriate technique for monitoring, assisting and/or progressing therapeutic exercises/activities.              Learning Progress Summary           Patient Acceptance, E,TB, VU by NICOLE at 1/11/2023 1426                   Point: Precautions (Done)     Description:   Instruct learner(s) on prescribed precautions during self-care and functional transfers.              Learning Progress Summary           Patient Acceptance, E,TB, VU by  at 1/9/2023 1436    Comment: OT POC, Role of OT, transfer training                   Point: Body mechanics (Done)     Description:   Instruct learner(s) on proper positioning and spine alignment during self-care, functional mobility activities and/or exercises.              Learning Progress Summary           Patient Acceptance, E,TB, VU by NICOLE at 1/10/2023 1400    Acceptance, E,TB, VU by  at 1/9/2023 1436    Comment: OT POC, Role of OT, transfer training                               User Key     Initials Effective Dates Name Provider Type Discipline    NICOLE 06/16/21 -  Matilde Rios COTA Occupational Therapist Assistant OT     08/11/22 -  Jessica Garnica OT Occupational Therapist OT              OT Recommendation and Plan  Therapy Frequency (OT): other (see comments) (5-7 d/wk)  Plan of Care Review  Plan  of Care Reviewed With: patient  Progress: no change  Outcome Evaluation: Pt defers EOB at thistime due to pain. Pt performed B UE exercises with 2 lb wt with fair tolerance. Pt reports she already had a bath today, Continue OT POC     Time Calculation:    Time Calculation- OT     Row Name 01/11/23 1426             Time Calculation- OT    OT Start Time 1052  -BB      OT Stop Time 1116  -BB      OT Time Calculation (min) 24 min  -BB      Total Timed Code Minutes- OT 24 minute(s)  -BB      OT Received On 01/11/23  -BB         Timed Charges    75901 - OT Therapeutic Exercise Minutes 24  -BB         Total Minutes    Timed Charges Total Minutes 24  -BB       Total Minutes 24  -BB            User Key  (r) = Recorded By, (t) = Taken By, (c) = Cosigned By    Initials Name Provider Type    Matilde Childers COTA Occupational Therapist Assistant              Therapy Charges for Today     Code Description Service Date Service Provider Modifiers Qty    01125419862 HC OT THER PROC EA 15 MIN 1/10/2023 Matilde Rios COTA GO 2    11493152979 HC OT SELF CARE/MGMT/TRAIN EA 15 MIN 1/10/2023 Matilde Rios COTA GO 1    19347060840 HC OT THER PROC EA 15 MIN 1/11/2023 Matilde Rios COTA GO 2               LINDA Henley  1/11/2023

## 2023-01-11 NOTE — OUTREACH NOTE
Prep Survey    Flowsheet Row Responses   [facility] [place] facility patient discharged from? [place]   Is LACE score < 7 ? No   Eligibility [facility]   Date of Admission 01/06/23   Date of Discharge 01/11/23   Discharge Disposition Home or Self Care   Discharge diagnosis Chest pain, unspecified type  Gastroparesis   Does the patient have one of the following disease processes/diagnoses(primary or secondary)? Other   Does the patient have Home health ordered? No   Is there a DME ordered? Yes   [facility]    Prep survey completed? Yes          FERNANDO RAMIREZ - Registered Nurse

## 2023-01-11 NOTE — PROGRESS NOTES
[facility] Medicine Services  DISCHARGE SUMMARY       Date of Admission: 1/6/2023  Date of Discharge:  1/11/2023  Primary Care Physician: Dolly Foss APRN    Presenting Problem/History of Present Illness:  Chest pain, unspecified type [R07.9]       Final Discharge Diagnoses:  Active Hospital Problems    Diagnosis    • Gastritis    • Chest pain, unspecified type    • Iron deficiency anemia due to chronic blood loss    • Gastroparesis        Consults:   Consults     Date and Time Order Name Status Description    1/9/2023  9:19 AM Inpatient Gastroenterology Consult Completed     1/7/2023  8:37 AM Inpatient Cardiology Consult Completed           Procedures Performed: Procedure(s):  ESOPHAGOGASTRODUODENOSCOPY           01/10 1632 UPPER GI ENDOSCOPY    Pertinent Test Results:   Lab Results (most recent)     Procedure Component Value Units Date/Time    POC Glucose Once [[id number]]  (Normal) Collected: 01/11/23 0601    Specimen: Blood Updated: 01/11/23 0849     Glucose 121 mg/dL      Comment: RN NotifiedOperator: [id number] Pruffi ID: [id number]       TISSUE EXAM, [facility] ([facility]) [[id number]] Collected: 01/10/23 1641    Specimen: Tissue from Gastric, Antrum; Tissue from Esophagus Updated: 01/11/23 0658    POC Glucose Once [[id number]]  (Abnormal) Collected: 01/10/23 1918    Specimen: Blood Updated: 01/10/23 2040     Glucose 187 mg/dL      Comment: RN NotifiedOperator: [id number] [facility] ID: [id number]       Basic Metabolic Panel [[id number]]  (Abnormal) Collected: 01/09/23 0920    Specimen: Blood Updated: 01/09/23 1015     Glucose 106 mg/dL      BUN 10 mg/dL      Creatinine 0.99 mg/dL      Sodium 136 mmol/L      Potassium 3.9 mmol/L      Comment: Slight hemolysis detected by analyzer. Results may be affected.        Chloride 101 mmol/L      CO2 29.0 mmol/L      Calcium 9.1 mg/dL      BUN/Creatinine Ratio 10.1     Anion Gap 6.0 mmol/L       eGFR 65.0 mL/min/1.73      Comment: National Kidney Foundation and American Society of Nephrology (ASN) Task Force recommended calculation based on the Chronic Kidney Disease Epidemiology Collaboration (CKD-EPI) equation refit without adjustment for race.       Narrative:      GFR Normal >60  Chronic Kidney Disease <60  Kidney Failure <15      Magnesium [[id number]]  (Normal) Collected: 01/09/23 0920    Specimen: Blood Updated: 01/09/23 1014     Magnesium 2.1 mg/dL     CBC & Differential [[id number]]  (Abnormal) Collected: 01/09/23 0740    Specimen: Blood Updated: 01/09/23 1012    Narrative:      The following orders were created for panel order CBC & Differential.  Procedure                               Abnormality         Status                     ---------                               -----------         ------                     CBC Auto Differential[[id number]]        Abnormal            Final result               Scan Slide[[id number]]                                       Final result                 Please view results for these tests on the individual orders.    Scan Slide [[id number]] Collected: 01/09/23 0740    Specimen: Blood Updated: 01/09/23 1012     RBC Morphology Normal     WBC Morphology Normal     Platelet Estimate Adequate    CBC Auto Differential [[id number]]  (Abnormal) Collected: 01/09/23 0740    Specimen: Blood Updated: 01/09/23 0819     WBC 7.24 10*3/mm3      RBC 4.70 10*6/mm3      Hemoglobin 13.5 g/dL      Hematocrit 41.1 %      MCV 87.4 fL      MCH 28.7 pg      MCHC 32.8 g/dL      RDW 13.4 %      RDW-SD 42.3 fl      MPV 10.4 fL      Platelets 279 10*3/mm3      Neutrophil % 58.9 %      Lymphocyte % 28.6 %      Monocyte % 7.9 %      Eosinophil % 3.3 %      Basophil % 0.6 %      Immature Grans % 0.7 %      Neutrophils, Absolute 4.27 10*3/mm3      Lymphocytes, Absolute 2.07 10*3/mm3      Monocytes, Absolute 0.57 10*3/mm3      Eosinophils, Absolute 0.24 10*3/mm3      Basophils, Absolute 0.04  10*3/mm3      Immature Grans, Absolute 0.05 10*3/mm3      nRBC 0.0 /100 WBC     Urinalysis With Culture If Indicated - Urine, Clean Catch [[id number]]  (Normal) Collected: 01/08/23 1240    Specimen: Urine, Clean Catch Updated: 01/08/23 1251     Color, UA Yellow     Appearance, UA Clear     pH, UA 6.0     Specific Gravity, UA 1.024     Glucose, UA Negative     Ketones, UA Negative     Bilirubin, UA Negative     Blood, UA Negative     Protein, UA Negative     Leuk Esterase, UA Negative     Nitrite, UA Negative     Urobilinogen, UA 1.0 E.U./dL    Narrative:      In absence of clinical symptoms, the presence of pyuria, bacteria, and/or nitrites on the urinalysis result does not correlate with infection.  Urine microscopic not indicated.    Basic Metabolic Panel [[id number]]  (Abnormal) Collected: 01/08/23 0607    Specimen: Blood Updated: 01/08/23 0648     Glucose 111 mg/dL      BUN 14 mg/dL      Creatinine 1.10 mg/dL      Sodium 139 mmol/L      Potassium 3.5 mmol/L      Chloride 100 mmol/L      CO2 28.0 mmol/L      Calcium 9.4 mg/dL      BUN/Creatinine Ratio 12.7     Anion Gap 11.0 mmol/L      eGFR 57.3 mL/min/1.73      Comment: National Kidney Foundation and American Society of Nephrology (ASN) Task Force recommended calculation based on the Chronic Kidney Disease Epidemiology Collaboration (CKD-EPI) equation refit without adjustment for race.       Narrative:      GFR Normal >60  Chronic Kidney Disease <60  Kidney Failure <15      Magnesium [[id number]]  (Normal) Collected: 01/08/23 0607    Specimen: Blood Updated: 01/08/23 0648     Magnesium 2.0 mg/dL     CBC & Differential [[id number]]  (Normal) Collected: 01/08/23 0607    Specimen: Blood Updated: 01/08/23 0627    Narrative:      The following orders were created for panel order CBC & Differential.  Procedure                               Abnormality         Status                     ---------                               -----------         ------                      CBC Auto Differential[[id number]]        Normal              Final result                 Please view results for these tests on the individual orders.    CBC Auto Differential [[id number]]  (Normal) Collected: 01/08/23 0607    Specimen: Blood Updated: 01/08/23 0627     WBC 9.27 10*3/mm3      RBC 4.64 10*6/mm3      Hemoglobin 13.2 g/dL      Hematocrit 40.8 %      MCV 87.9 fL      MCH 28.4 pg      MCHC 32.4 g/dL      RDW 13.3 %      RDW-SD 42.5 fl      MPV 9.2 fL      Platelets 401 10*3/mm3      Neutrophil % 64.4 %      Lymphocyte % 24.3 %      Monocyte % 6.9 %      Eosinophil % 3.5 %      Basophil % 0.5 %      Immature Grans % 0.4 %      Neutrophils, Absolute 5.97 10*3/mm3      Lymphocytes, Absolute 2.25 10*3/mm3      Monocytes, Absolute 0.64 10*3/mm3      Eosinophils, Absolute 0.32 10*3/mm3      Basophils, Absolute 0.05 10*3/mm3      Immature Grans, Absolute 0.04 10*3/mm3      nRBC 0.0 /100 WBC     Troponin [[id number]]  (Normal) Collected: 01/06/23 1421    Specimen: Blood Updated: 01/06/23 1450     Troponin T <0.010 ng/mL     Narrative:      Troponin T Reference Range:  <= 0.03 ng/mL-   Negative for AMI  >0.03 ng/mL-     Abnormal for myocardial necrosis.  Clinicians would have to utilize clinical acumen, EKG, Troponin and serial changes to determine if it is an Acute Myocardial Infarction or myocardial injury due to an underlying chronic condition.       Results may be falsely decreased if patient taking Biotin.      Comprehensive Metabolic Panel [[id number]]  (Abnormal) Collected: 01/06/23 1051    Specimen: Blood Updated: 01/06/23 1241     Glucose 204 mg/dL      BUN 13 mg/dL      Creatinine 1.02 mg/dL      Sodium 139 mmol/L      Potassium 3.6 mmol/L      Chloride 100 mmol/L      CO2 30.0 mmol/L      Calcium 9.6 mg/dL      Total Protein 7.1 g/dL      Albumin 4.1 g/dL      ALT (SGPT) 18 U/L      AST (SGOT) 14 U/L      Alkaline Phosphatase 120 U/L      Total Bilirubin 0.3 mg/dL      Globulin 3.0 gm/dL      A/G Ratio  1.4 g/dL      BUN/Creatinine Ratio 12.7     Anion Gap 9.0 mmol/L      eGFR 62.7 mL/min/1.73      Comment: National Kidney Foundation and American Society of Nephrology (ASN) Task Force recommended calculation based on the Chronic Kidney Disease Epidemiology Collaboration (CKD-EPI) equation refit without adjustment for race.       Narrative:      GFR Normal >60  Chronic Kidney Disease <60  Kidney Failure <15      Troponin [[id number]]  (Normal) Collected: 01/06/23 1051    Specimen: Blood Updated: 01/06/23 1239     Troponin T <0.010 ng/mL     Narrative:      Troponin T Reference Range:  <= 0.03 ng/mL-   Negative for AMI  >0.03 ng/mL-     Abnormal for myocardial necrosis.  Clinicians would have to utilize clinical acumen, EKG, Troponin and serial changes to determine if it is an Acute Myocardial Infarction or myocardial injury due to an underlying chronic condition.       Results may be falsely decreased if patient taking Biotin.      BNP [[id number]]  (Normal) Collected: 01/06/23 1051    Specimen: Blood Updated: 01/06/23 1238     proBNP 246.8 pg/mL     Narrative:      Among patients with dyspnea, NT-proBNP is highly sensitive for the detection of acute congestive heart failure. In addition NT-proBNP of <300 pg/ml effectively rules out acute congestive heart failure with 99% negative predictive value.      [place] Draw [[id number]] Collected: 01/06/23 1051    Specimen: Blood Updated: 01/06/23 1230    Narrative:      The following orders were created for panel order [place] Draw.  Procedure                               Abnormality         Status                     ---------                               -----------         ------                     Green Top (Gel)[[id number]]                                  Final result               Lavender Top[[id number]]                                     Final result               Gold Top - SST[[id number]]                                   Final result               Light Blue  Top[[id number]]                                   Final result                 Please view results for these tests on the individual orders.    Green Top (Gel) [[id number]] Collected: 01/06/23 1051    Specimen: Blood Updated: 01/06/23 1230     Extra Tube Hold for add-ons.     Comment: Auto resulted.       Lavender Top [[id number]] Collected: 01/06/23 1051    Specimen: Blood Updated: 01/06/23 1230     Extra Tube hold for add-on     Comment: Auto resulted       Gold Top - SST [[id number]] Collected: 01/06/23 1051    Specimen: Blood Updated: 01/06/23 1230     Extra Tube Hold for add-ons.     Comment: Auto resulted.       Light Blue Top [[id number]] Collected: 01/06/23 1051    Specimen: Blood Updated: 01/06/23 1230     Extra Tube Hold for add-ons.     Comment: Auto resulted           Imaging Results (Most Recent)     Procedure Component Value Units Date/Time    CT Abdomen Pelvis With Contrast [[id number]] Collected: 01/08/23 1129     Updated: 01/09/23 0113    Narrative:      EXAM: CT Abdomen and Pelvis with contrast     INDICATION:  Abdominal pain, acute, nonlocalized, R07.9 Chest  pain, unspecified    TECHNIQUE: Helical CT of the abdomen and pelvis was obtained from  the diaphragm through the ischial tuberosities using contrast.  Axial, coronal and sagittal images were obtained.    Dose reduction techniques were used including automated exposure  control and/or adjustment of the mA and/or kV according to  patient size.    COMPARISON: 7/2/2021 CT    FINDINGS:  LUNG BASES: Right hemidiaphragm elevation. Unchanged 3 mm right  middle lobe pulmonary nodule on image 3.    STOMACH: No significant finding.    LIVER: No significant abnormality.     BILIARY: Pneumobilia is slightly increased since 2021.  Cholecystectomy. No biliary ductal dilation.    PANCREAS: No significant abnormality.    SPLEEN: No significant abnormality.    ADRENAL GLANDS: No significant abnormality.    KIDNEYS/BLADDER:  No significant abnormality.    VESSELS:  Nonaneurysmal abdominal aorta.    LYMPH NODES: No enlarged abdominal or pelvic lymph nodes.    OTHER PELVIC: No free pelvic fluid.    GI TRACT: No significant abnormality. Appendectomy.    ABDOMINAL WALL: Unchanged bilateral lower quadrant subcutaneous  stranding, likely inconsequential.    PERITONEUM: No ascites or pneumoperitoneum.     BONES/SPINE: No acute abnormality. Sacral stimulator noted. No  evidence of lead discontinuity.        Impression:        Slight increase in pneumobilia since 2021, possibly representing  sphincter of Oddi dysfunction.    No other acute or new findings in the abdomen or pelvis.        Electronically signed by:  Best Wilburn MD  1/9/2023 1:11 AM  CST Workstation: [id number]    XR Chest 1 View [[id number]] Collected: 01/06/23 0000     Updated: 01/06/23 1332    Narrative:      EXAM: XR CHEST 1 VIEW     HISTORY: Chest Pain triage protocol  Chest Pain Triage Protocol.    COMPARISON: December 22, 2020    FINDINGS:    Heart: Enlarged    Mediastinum: Midline sternotomy    Pleura: No significant effusion    Lungs: No consolidation. Lung bases are partially obscured.    Bones: No acute abnormality.    Abdomen: Visualized upper abdomen is unremarkable      Impression:        No evidence of acute cardiopulmonary disease on this single view  chest x-ray.    Electronically signed by:  Gaurav Carreon DO  1/6/2023 1:30 PM CST  Workstation: [id number]          Chief Complaint on Day of Discharge: None    Hospital Course:  The patient is a 63 y.o. female who presented to the emergency department on 1/6/2023 with chief complaint of chest pain.  She described the chest pain as midepigastric, radiating around her right lower rib cage/upper abdomen to her back.  She deniesd associated radiation.  She complained of mild associated nausea but no vomiting.  Also felt short of breath at times.  She was laying in bed when the pain started.  The pain was continuous, did not resolve until she received 1  "sublingual nitroglycerin and Dilaudid here in the emergency department.  She has a long history of cardiac issues including history of CABG, history of PCI, CHF.  Most recent left heart cath in 2022 showing the followin.  One-vessel obstructive coronary artery disease involving chronic total occlusion of the LAD.  LIMA to LAD is patent, target vessel is small in caliber with severe diffuse disease but not amenable to PCI  2.  Patent stents in the LAD extending into the diagonal  3.  Mild disease in the RCA and left circumflex which is unchanged from before  4.  Normal left-sided filling pressures     She denied dark or tarry sticky stools, hematemesis, vomiting.     She was evaluated by cardiology who felt that the pain was more likely to be GI related given her history of gastroparesis.  She underwent EGD which showed:  - Savary-Gamboa Grade I (single erosion or exudate, oval or linear, single fold) esophagitis with no bleeding was found at  the gastroesophageal junction. Biopsies were taken with a cold forceps for histology.  - Patchy mild inflammation characterized by congestion (edema), erosions and erythema was found in the gastric  antrum. Biopsies were taken with a cold forceps for histology.    She was started on Carafate.  We will continue her PPI.  She will follow-up with GI, cardiology, PCP.      Condition on Discharge: Stable, improved    Physical Exam on Discharge:  /64 (BP Location: Left arm, Patient Position: Lying)   Pulse 69   Temp 97.3 °F (36.3 °C) (Temporal)   Resp 18   Ht 172.7 cm (68\")   Wt 122 kg (268 lb 15.4 oz)   SpO2 92% Comment: at rest  BMI 40.90 kg/m²   Vitals and nursing note reviewed.   Constitutional:       General: No acute distress.     Appearance: Normal appearance.   HENT:      Head: Normocephalic and atraumatic.      Mouth: Mucous membranes are moist.   Eyes:      Conjunctivae/sclerae: Conjunctivae normal.      Pupils: Pupils are equal, round, and reactive " to light.   Cardiovascular:      Rate and Rhythm: Normal rate and regular rhythm.   Pulmonary:      Effort: Pulmonary effort is normal.      Breath sounds: Normal breath sounds.   Abdominal:      General: Abdomen is flat.      Palpations: Abdomen is soft.      Tenderness: Mild midepigastric and right upper quadrant tenderness that rebound or guarding  Musculoskeletal:         General: No signs of injury. Normal range of motion.   Skin:     General: Skin is warm and dry.   Neurological:      General: No focal deficit present.      Mental Status: Alert and oriented  Psychiatric:         Mood and Affect: Mood normal.         Behavior: Behavior normal.       Discharge Medications:     Discharge Medications      Continue These Medications      Instructions Start Date   Accu-Chek Guide test strip  Generic drug: glucose blood   1 each, Other, 4 Times Daily, To test blood sugar 4X daily. For  Dx E11.65      accu-chek soft touch lancets   As directed      amLODIPine 5 MG tablet  Commonly known as: NORVASC   5 mg, Oral, Daily      ARIPiprazole 5 MG tablet  Commonly known as: ABILIFY   TAKE 1 TABLET BY MOUTH EVERY DAY      aspirin  MG tablet   325 mg, Oral, Daily      atorvastatin 80 MG tablet  Commonly known as: LIPITOR   80 mg, Oral, Daily      B-D ULTRAFINE III SHORT PEN 31G X 8 MM misc  Generic drug: Insulin Pen Needle   USE TO INJECT 5 TIMES A DAY      B-D ULTRAFINE III SHORT PEN 31G X 8 MM misc  Generic drug: Insulin Pen Needle   USE UP TO 4 (FOUR) TIMES DAILY WITH INSULIN AS DIRECTED.      BASAGLAR KWIKPEN 100 UNIT/ML injection pen   Inject 40 units into the appropriate area every morning and 35 units into the appropriate area every night      BD [employer] misc   1 each, Does not apply, Take As Directed      butalbital-acetaminophen-caffeine -40 MG per tablet  Commonly known as: FIORICET, ESGIC   Take one to two tablets by mouth per headache episode as needed.      Calcium Citrate-Vitamin D  "250-200 MG-UNIT tablet   2 tablets, Oral, 2 Times Daily      clopidogrel 75 MG tablet  Commonly known as: PLAVIX   75 mg, Oral, Daily      [employer] Diclofenac Sodium 1 % gel gel  Generic drug: Diclofenac Sodium   APPLY 4 G TOPICALLY TO THE APPROPRIATE AREA AS DIRECTED 2 (TWO) TIMES A DAY. SMALL AMOUNT TO AFFECTED AREA      cyanocobalamin 1000 MCG/ML injection   1,000 mcg, Intramuscular, Every 28 Days      Easy Touch FlipLock Needles 25G X 1-1/2\" misc  Generic drug: Needle (Disp)   1 each, Does not apply, Every 28 Days, For administering B12 cyanocobalomin. Dx vitamin B12 deficiency.      folic acid 1 MG tablet  Commonly known as: FOLVITE   TAKE 1 TABLET BY MOUTH EVERY DAY      furosemide 40 MG tablet  Commonly known as: LASIX   TAKE 1 TABLET BY MOUTH EVERY DAY      gabapentin 100 MG capsule  Commonly known as: NEURONTIN   100 mg, Oral, Every 12 Hours      glucose monitor monitoring kit   1 each, Does not apply, Daily, accucheck eve meter, E11.9      hydroCHLOROthiazide 12.5 MG tablet  Commonly known as: HYDRODIURIL   TAKE 1 TABLET BY MOUTH EVERY DAY      HYDROcodone-acetaminophen 7.5-325 MG per tablet  Commonly known as: NORCO   1 tablet, Oral, Every 6 Hours PRN      Hypodermic Needle 20G X 1\" misc   1 each, Does not apply, Every 28 Days, For drawing up B12 for injection monthly, change back to smaller gauge needle prior to injection. Dx Vitamin B12  Deficiency.      isosorbide mononitrate 30 MG 24 hr tablet  Commonly known as: IMDUR   TAKE 1 TABLET BY MOUTH EVERY DAY      levothyroxine 25 MCG tablet  Commonly known as: SYNTHROID, LEVOTHROID   25 mcg, Oral, Daily      meclizine 25 MG tablet  Commonly known as: ANTIVERT   25 mg, Oral, 3 Times Daily PRN      meloxicam 15 MG tablet  Commonly known as: MOBIC   TAKE 1 TABLET BY MOUTH EVERY DAY WITH FOOD      methocarbamol 500 MG tablet  Commonly known as: ROBAXIN   TAKE 1 TABLET BY MOUTH 2 TIMES A DAY AS NEEDED FOR MUSCLE SPASMS.      metoclopramide 10 MG tablet  Commonly " "known as: REGLAN   10 mg, Oral, 4 Times Daily PRN      metoclopramide 5 MG tablet  Commonly known as: REGLAN   5 mg, Oral, 3 Times Daily PRN      metoprolol succinate XL 50 MG 24 hr tablet  Commonly known as: TOPROL-XL   50 mg, Oral, Daily, Dose increased 5/24/21      naloxone 0.4 MG/ML injection  Commonly known as: NARCAN   0.2 mg, Intravenous, Every 5 Minutes PRN      nitroglycerin 0.4 MG SL tablet  Commonly known as: NITROSTAT   0.4 mg, Sublingual, Every 5 Minutes PRN, Take no more than 3 doses in 15 minutes.      NovoLOG FlexPen 100 UNIT/ML solution pen-injector sc pen  Generic drug: insulin aspart   Inject up to 45 units  3 TIMES A DAY BEFORE MEALS      ondansetron 8 MG tablet  Commonly known as: ZOFRAN   8 mg, Oral, Every 8 Hours PRN      ondansetron ODT 4 MG disintegrating tablet  Commonly known as: ZOFRAN-ODT   4 mg, Translingual, 4 Times Daily PRN      ONE TOUCH ULTRA MINI w/Device kit   Patient will need the Accu-Check Avitio meter      pantoprazole 20 MG EC tablet  Commonly known as: PROTONIX   40 mg, Oral, Daily      ranolazine 500 MG 12 hr tablet  Commonly known as: RANEXA   500 mg, Oral, Every 12 Hours Scheduled      Syringe 20G X 1-1/2\" 3 ML misc   1 each, Does not apply, Every 28 Days, For injection cyanocobalomin, Dx vit B12 deficiency.      venlafaxine XR 75 MG 24 hr capsule  Commonly known as: EFFEXOR-XR   75 mg, Oral, Daily With Breakfast      vitamin D 1.25 MG (55687 UT) capsule capsule  Commonly known as: ERGOCALCIFEROL   TAKE 1 CAPSULE BY MOUTH EVERY 7 DAYS.             Discharge Diet:   Diet Instructions     Diet: Consistent Carbohydrate, Cardiac      Discharge Diet:  Consistent Carbohydrate  Cardiac             Activity at Discharge:   Activity Instructions     Activity as Tolerated            Discharge Care Plan/Instructions:     Chest pain, history of CABG and PCI  -Somewhat atypical chest pain to midepigastric/right upper quadrant/right lower chest wall pain, resolved with 1 " nitroglycerin  - Long history of coronary artery disease with history of CABG and PCI  - Sees Dr. Snowden outpatient, will follow closely outpatient     Abdominal pain  - Continued pain right upper quadrant/right flank.  - UA negative  - CT abdomen showed mild pneumobilia but otherwise unremarkable  - Endoscopy done, recommendations per chart     Peptic ulcer disease, gastroparesis  -Protonix     Type 2 diabetes  -Sliding scale     Hypertension  Hyperlipidemia  Anxiety  Depression  GERD  Obesity  Ménière's         Follow-up Appointments:   Future Appointments   Date Time Provider Department Center   1/13/2023  8:15 AM Elvis Gamboa APRN [facility] END [facility]   1/18/2023 10:15 AM [facility] WOMEN CTR MAMM 1 [facility]   3/3/2023 10:00 AM Dolly Foss APRN [facility] PC POW [facility]   3/17/2023  9:00 AM MAD HVC ECHO ROOM 2 [facility] HVC CARD [place]   4/17/2023 10:30 AM Bill Gibson MD [facility] CD [facility] None   5/2/2023  9:00 AM Marcela Lawson APRN [facility] GE [facility]   6/20/2023  1:00 PM NURSE BH MAD BH MAD OPI [facility]   6/20/2023  1:30 PM Teressa Hammond APRN [facility] ONC [facility]       Test Results Pending at Discharge:   Pending Labs     Order Current Status    TISSUE EXAM, [facility]) In process            Copied text in this note has been reviewed and is accurate as of 1/11/2023     Time: 32 minutes

## 2023-01-11 NOTE — DISCHARGE SUMMARY
[facility] Medicine Services  DISCHARGE SUMMARY       Date of Admission: 1/6/2023  Date of Discharge:  1/11/2023  Primary Care Physician: Dolly Foss APRN    Presenting Problem/History of Present Illness:  Chest pain, unspecified type [R07.9]       Final Discharge Diagnoses:  Active Hospital Problems    Diagnosis    • Gastritis    • Chest pain, unspecified type    • Iron deficiency anemia due to chronic blood loss    • Gastroparesis        Consults:   Consults     Date and Time Order Name Status Description    1/9/2023  9:19 AM Inpatient Gastroenterology Consult Completed     1/7/2023  8:37 AM Inpatient Cardiology Consult Completed           Procedures Performed: Procedure(s):  ESOPHAGOGASTRODUODENOSCOPY           01/10 1632 UPPER GI ENDOSCOPY    Pertinent Test Results:   Lab Results (most recent)     Procedure Component Value Units Date/Time    POC Glucose Once [[id number]]  (Normal) Collected: 01/11/23 0601    Specimen: Blood Updated: 01/11/23 0849     Glucose 121 mg/dL      Comment: RN NotifiedOperator: [id number] [employer] ID: [id number]       TISSUE EXAM, [facility] ([facility]) [[id number]] Collected: 01/10/23 1641    Specimen: Tissue from Gastric, Antrum; Tissue from Esophagus Updated: 01/11/23 0658    POC Glucose Once [[id number]]  (Abnormal) Collected: 01/10/23 1918    Specimen: Blood Updated: 01/10/23 2040     Glucose 187 mg/dL      Comment: RN NotifiedOperator: [id number] [employer] ID: [id number]       Basic Metabolic Panel [[id number]]  (Abnormal) Collected: 01/09/23 0920    Specimen: Blood Updated: 01/09/23 1015     Glucose 106 mg/dL      BUN 10 mg/dL      Creatinine 0.99 mg/dL      Sodium 136 mmol/L      Potassium 3.9 mmol/L      Comment: Slight hemolysis detected by analyzer. Results may be affected.        Chloride 101 mmol/L      CO2 29.0 mmol/L      Calcium 9.1 mg/dL      BUN/Creatinine Ratio 10.1     Anion Gap 6.0 mmol/L       eGFR 65.0 mL/min/1.73      Comment: National Kidney Foundation and American Society of Nephrology (ASN) Task Force recommended calculation based on the Chronic Kidney Disease Epidemiology Collaboration (CKD-EPI) equation refit without adjustment for race.       Narrative:      GFR Normal >60  Chronic Kidney Disease <60  Kidney Failure <15      Magnesium [[id number]]  (Normal) Collected: 01/09/23 0920    Specimen: Blood Updated: 01/09/23 1014     Magnesium 2.1 mg/dL     CBC & Differential [[id number]]  (Abnormal) Collected: 01/09/23 0740    Specimen: Blood Updated: 01/09/23 1012    Narrative:      The following orders were created for panel order CBC & Differential.  Procedure                               Abnormality         Status                     ---------                               -----------         ------                     CBC Auto Differential[[id number]]        Abnormal            Final result               Scan Slide[[id number]]                                       Final result                 Please view results for these tests on the individual orders.    Scan Slide [[id number]] Collected: 01/09/23 0740    Specimen: Blood Updated: 01/09/23 1012     RBC Morphology Normal     WBC Morphology Normal     Platelet Estimate Adequate    CBC Auto Differential [[id number]]  (Abnormal) Collected: 01/09/23 0740    Specimen: Blood Updated: 01/09/23 0819     WBC 7.24 10*3/mm3      RBC 4.70 10*6/mm3      Hemoglobin 13.5 g/dL      Hematocrit 41.1 %      MCV 87.4 fL      MCH 28.7 pg      MCHC 32.8 g/dL      RDW 13.4 %      RDW-SD 42.3 fl      MPV 10.4 fL      Platelets 279 10*3/mm3      Neutrophil % 58.9 %      Lymphocyte % 28.6 %      Monocyte % 7.9 %      Eosinophil % 3.3 %      Basophil % 0.6 %      Immature Grans % 0.7 %      Neutrophils, Absolute 4.27 10*3/mm3      Lymphocytes, Absolute 2.07 10*3/mm3      Monocytes, Absolute 0.57 10*3/mm3      Eosinophils, Absolute 0.24 10*3/mm3      Basophils, Absolute 0.04  10*3/mm3      Immature Grans, Absolute 0.05 10*3/mm3      nRBC 0.0 /100 WBC     Urinalysis With Culture If Indicated - Urine, Clean Catch [[id number]]  (Normal) Collected: 01/08/23 1240    Specimen: Urine, Clean Catch Updated: 01/08/23 1251     Color, UA Yellow     Appearance, UA Clear     pH, UA 6.0     Specific Gravity, UA 1.024     Glucose, UA Negative     Ketones, UA Negative     Bilirubin, UA Negative     Blood, UA Negative     Protein, UA Negative     Leuk Esterase, UA Negative     Nitrite, UA Negative     Urobilinogen, UA 1.0 E.U./dL    Narrative:      In absence of clinical symptoms, the presence of pyuria, bacteria, and/or nitrites on the urinalysis result does not correlate with infection.  Urine microscopic not indicated.    Basic Metabolic Panel [[id number]]  (Abnormal) Collected: 01/08/23 0607    Specimen: Blood Updated: 01/08/23 0648     Glucose 111 mg/dL      BUN 14 mg/dL      Creatinine 1.10 mg/dL      Sodium 139 mmol/L      Potassium 3.5 mmol/L      Chloride 100 mmol/L      CO2 28.0 mmol/L      Calcium 9.4 mg/dL      BUN/Creatinine Ratio 12.7     Anion Gap 11.0 mmol/L      eGFR 57.3 mL/min/1.73      Comment: National Kidney Foundation and American Society of Nephrology (ASN) Task Force recommended calculation based on the Chronic Kidney Disease Epidemiology Collaboration (CKD-EPI) equation refit without adjustment for race.       Narrative:      GFR Normal >60  Chronic Kidney Disease <60  Kidney Failure <15      Magnesium [[id number]]  (Normal) Collected: 01/08/23 0607    Specimen: Blood Updated: 01/08/23 0648     Magnesium 2.0 mg/dL     CBC & Differential [[id number]]  (Normal) Collected: 01/08/23 0607    Specimen: Blood Updated: 01/08/23 0627    Narrative:      The following orders were created for panel order CBC & Differential.  Procedure                               Abnormality         Status                     ---------                               -----------         ------                      CBC Auto Differential[[id number]]        Normal              Final result                 Please view results for these tests on the individual orders.    CBC Auto Differential [[id number]]  (Normal) Collected: 01/08/23 0607    Specimen: Blood Updated: 01/08/23 0627     WBC 9.27 10*3/mm3      RBC 4.64 10*6/mm3      Hemoglobin 13.2 g/dL      Hematocrit 40.8 %      MCV 87.9 fL      MCH 28.4 pg      MCHC 32.4 g/dL      RDW 13.3 %      RDW-SD 42.5 fl      MPV 9.2 fL      Platelets 401 10*3/mm3      Neutrophil % 64.4 %      Lymphocyte % 24.3 %      Monocyte % 6.9 %      Eosinophil % 3.5 %      Basophil % 0.5 %      Immature Grans % 0.4 %      Neutrophils, Absolute 5.97 10*3/mm3      Lymphocytes, Absolute 2.25 10*3/mm3      Monocytes, Absolute 0.64 10*3/mm3      Eosinophils, Absolute 0.32 10*3/mm3      Basophils, Absolute 0.05 10*3/mm3      Immature Grans, Absolute 0.04 10*3/mm3      nRBC 0.0 /100 WBC     Troponin [[id number]]  (Normal) Collected: 01/06/23 1421    Specimen: Blood Updated: 01/06/23 1450     Troponin T <0.010 ng/mL     Narrative:      Troponin T Reference Range:  <= 0.03 ng/mL-   Negative for AMI  >0.03 ng/mL-     Abnormal for myocardial necrosis.  Clinicians would have to utilize clinical acumen, EKG, Troponin and serial changes to determine if it is an Acute Myocardial Infarction or myocardial injury due to an underlying chronic condition.       Results may be falsely decreased if patient taking Biotin.      Comprehensive Metabolic Panel [[id number]]  (Abnormal) Collected: 01/06/23 1051    Specimen: Blood Updated: 01/06/23 1241     Glucose 204 mg/dL      BUN 13 mg/dL      Creatinine 1.02 mg/dL      Sodium 139 mmol/L      Potassium 3.6 mmol/L      Chloride 100 mmol/L      CO2 30.0 mmol/L      Calcium 9.6 mg/dL      Total Protein 7.1 g/dL      Albumin 4.1 g/dL      ALT (SGPT) 18 U/L      AST (SGOT) 14 U/L      Alkaline Phosphatase 120 U/L      Total Bilirubin 0.3 mg/dL      Globulin 3.0 gm/dL      A/G Ratio  1.4 g/dL      BUN/Creatinine Ratio 12.7     Anion Gap 9.0 mmol/L      eGFR 62.7 mL/min/1.73      Comment: National Kidney Foundation and American Society of Nephrology (ASN) Task Force recommended calculation based on the Chronic Kidney Disease Epidemiology Collaboration (CKD-EPI) equation refit without adjustment for race.       Narrative:      GFR Normal >60  Chronic Kidney Disease <60  Kidney Failure <15      Troponin [[id number]]  (Normal) Collected: 01/06/23 1051    Specimen: Blood Updated: 01/06/23 1239     Troponin T <0.010 ng/mL     Narrative:      Troponin T Reference Range:  <= 0.03 ng/mL-   Negative for AMI  >0.03 ng/mL-     Abnormal for myocardial necrosis.  Clinicians would have to utilize clinical acumen, EKG, Troponin and serial changes to determine if it is an Acute Myocardial Infarction or myocardial injury due to an underlying chronic condition.       Results may be falsely decreased if patient taking Biotin.      BNP [[id number]]  (Normal) Collected: 01/06/23 1051    Specimen: Blood Updated: 01/06/23 1238     proBNP 246.8 pg/mL     Narrative:      Among patients with dyspnea, NT-proBNP is highly sensitive for the detection of acute congestive heart failure. In addition NT-proBNP of <300 pg/ml effectively rules out acute congestive heart failure with 99% negative predictive value.      [place] Draw [[id number]] Collected: 01/06/23 1051    Specimen: Blood Updated: 01/06/23 1230    Narrative:      The following orders were created for panel order [place] Draw.  Procedure                               Abnormality         Status                     ---------                               -----------         ------                     Green Top (Gel)[[id number]]                                  Final result               Lavender Top[[id number]]                                     Final result               Gold Top - SST[[id number]]                                   Final result               Light Blue  Top[[id number]]                                   Final result                 Please view results for these tests on the individual orders.    Green Top (Gel) [[id number]] Collected: 01/06/23 1051    Specimen: Blood Updated: 01/06/23 1230     Extra Tube Hold for add-ons.     Comment: Auto resulted.       Lavender Top [[id number]] Collected: 01/06/23 1051    Specimen: Blood Updated: 01/06/23 1230     Extra Tube hold for add-on     Comment: Auto resulted       Gold Top - SST [[id number]] Collected: 01/06/23 1051    Specimen: Blood Updated: 01/06/23 1230     Extra Tube Hold for add-ons.     Comment: Auto resulted.       Light Blue Top [[id number]] Collected: 01/06/23 1051    Specimen: Blood Updated: 01/06/23 1230     Extra Tube Hold for add-ons.     Comment: Auto resulted           Imaging Results (Most Recent)     Procedure Component Value Units Date/Time    CT Abdomen Pelvis With Contrast [[id number]] Collected: 01/08/23 1129     Updated: 01/09/23 0113    Narrative:      EXAM: CT Abdomen and Pelvis with contrast     INDICATION:  Abdominal pain, acute, nonlocalized, R07.9 Chest  pain, unspecified    TECHNIQUE: Helical CT of the abdomen and pelvis was obtained from  the diaphragm through the ischial tuberosities using contrast.  Axial, coronal and sagittal images were obtained.    Dose reduction techniques were used including automated exposure  control and/or adjustment of the mA and/or kV according to  patient size.    COMPARISON: 7/2/2021 CT    FINDINGS:  LUNG BASES: Right hemidiaphragm elevation. Unchanged 3 mm right  middle lobe pulmonary nodule on image 3.    STOMACH: No significant finding.    LIVER: No significant abnormality.     BILIARY: Pneumobilia is slightly increased since 2021.  Cholecystectomy. No biliary ductal dilation.    PANCREAS: No significant abnormality.    SPLEEN: No significant abnormality.    ADRENAL GLANDS: No significant abnormality.    KIDNEYS/BLADDER:  No significant abnormality.    VESSELS:  Nonaneurysmal abdominal aorta.    LYMPH NODES: No enlarged abdominal or pelvic lymph nodes.    OTHER PELVIC: No free pelvic fluid.    GI TRACT: No significant abnormality. Appendectomy.    ABDOMINAL WALL: Unchanged bilateral lower quadrant subcutaneous  stranding, likely inconsequential.    PERITONEUM: No ascites or pneumoperitoneum.     BONES/SPINE: No acute abnormality. Sacral stimulator noted. No  evidence of lead discontinuity.        Impression:        Slight increase in pneumobilia since 2021, possibly representing  sphincter of Oddi dysfunction.    No other acute or new findings in the abdomen or pelvis.        Electronically signed by:  Best Wilburn MD  1/9/2023 1:11 AM  CST Workstation: [id number]    XR Chest 1 View [[id number]] Collected: 01/06/23 0000     Updated: 01/06/23 1332    Narrative:      EXAM: XR CHEST 1 VIEW     HISTORY: Chest Pain triage protocol  Chest Pain Triage Protocol.    COMPARISON: December 22, 2020    FINDINGS:    Heart: Enlarged    Mediastinum: Midline sternotomy    Pleura: No significant effusion    Lungs: No consolidation. Lung bases are partially obscured.    Bones: No acute abnormality.    Abdomen: Visualized upper abdomen is unremarkable      Impression:        No evidence of acute cardiopulmonary disease on this single view  chest x-ray.    Electronically signed by:  Gaurav Carreon DO  1/6/2023 1:30 PM CST  Workstation: [id number]          Chief Complaint on Day of Discharge: None    Hospital Course:  The patient is a 63 y.o. female who presented to the emergency department on 1/6/2023 with chief complaint of chest pain.  She described the chest pain as midepigastric, radiating around her right lower rib cage/upper abdomen to her back.  She deniesd associated radiation.  She complained of mild associated nausea but no vomiting.  Also felt short of breath at times.  She was laying in bed when the pain started.  The pain was continuous, did not resolve until she received 1  "sublingual nitroglycerin and Dilaudid here in the emergency department.  She has a long history of cardiac issues including history of CABG, history of PCI, CHF.  Most recent left heart cath in 2022 showing the followin.  One-vessel obstructive coronary artery disease involving chronic total occlusion of the LAD.  LIMA to LAD is patent, target vessel is small in caliber with severe diffuse disease but not amenable to PCI  2.  Patent stents in the LAD extending into the diagonal  3.  Mild disease in the RCA and left circumflex which is unchanged from before  4.  Normal left-sided filling pressures     She denied dark or tarry sticky stools, hematemesis, vomiting.     She was evaluated by cardiology who felt that the pain was more likely to be GI related given her history of gastroparesis.  She underwent EGD which showed:  - Savary-Gamboa Grade I (single erosion or exudate, oval or linear, single fold) esophagitis with no bleeding was found at  the gastroesophageal junction. Biopsies were taken with a cold forceps for histology.  - Patchy mild inflammation characterized by congestion (edema), erosions and erythema was found in the gastric  antrum. Biopsies were taken with a cold forceps for histology.    She was started on Carafate.  We will continue her PPI.  She will follow-up with GI, cardiology, PCP.      Condition on Discharge: Stable, improved    Physical Exam on Discharge:  /64 (BP Location: Left arm, Patient Position: Lying)   Pulse 69   Temp 97.3 °F (36.3 °C) (Temporal)   Resp 18   Ht 172.7 cm (68\")   Wt 122 kg (268 lb 15.4 oz)   SpO2 92% Comment: at rest  BMI 40.90 kg/m²   Vitals and nursing note reviewed.   Constitutional:       General: No acute distress.     Appearance: Normal appearance.   HENT:      Head: Normocephalic and atraumatic.      Mouth: Mucous membranes are moist.   Eyes:      Conjunctivae/sclerae: Conjunctivae normal.      Pupils: Pupils are equal, round, and reactive " to light.   Cardiovascular:      Rate and Rhythm: Normal rate and regular rhythm.   Pulmonary:      Effort: Pulmonary effort is normal.      Breath sounds: Normal breath sounds.   Abdominal:      General: Abdomen is flat.      Palpations: Abdomen is soft.      Tenderness: Mild midepigastric and right upper quadrant tenderness that rebound or guarding  Musculoskeletal:         General: No signs of injury. Normal range of motion.   Skin:     General: Skin is warm and dry.   Neurological:      General: No focal deficit present.      Mental Status: Alert and oriented  Psychiatric:         Mood and Affect: Mood normal.         Behavior: Behavior normal.       Discharge Medications:     Discharge Medications      Continue These Medications      Instructions Start Date   Accu-Chek Guide test strip  Generic drug: glucose blood   1 each, Other, 4 Times Daily, To test blood sugar 4X daily. For  Dx E11.65      accu-chek soft touch lancets   As directed      amLODIPine 5 MG tablet  Commonly known as: NORVASC   5 mg, Oral, Daily      ARIPiprazole 5 MG tablet  Commonly known as: ABILIFY   TAKE 1 TABLET BY MOUTH EVERY DAY      aspirin  MG tablet   325 mg, Oral, Daily      atorvastatin 80 MG tablet  Commonly known as: LIPITOR   80 mg, Oral, Daily      B-D ULTRAFINE III SHORT PEN 31G X 8 MM misc  Generic drug: Insulin Pen Needle   USE TO INJECT 5 TIMES A DAY      B-D ULTRAFINE III SHORT PEN 31G X 8 MM misc  Generic drug: Insulin Pen Needle   USE UP TO 4 (FOUR) TIMES DAILY WITH INSULIN AS DIRECTED.      BASAGLAR KWIKPEN 100 UNIT/ML injection pen   Inject 40 units into the appropriate area every morning and 35 units into the appropriate area every night      BD [employer] misc   1 each, Does not apply, Take As Directed      butalbital-acetaminophen-caffeine -40 MG per tablet  Commonly known as: FIORICET, ESGIC   Take one to two tablets by mouth per headache episode as needed.      Calcium Citrate-Vitamin D  "250-200 MG-UNIT tablet   2 tablets, Oral, 2 Times Daily      clopidogrel 75 MG tablet  Commonly known as: PLAVIX   75 mg, Oral, Daily      [employer] Diclofenac Sodium 1 % gel gel  Generic drug: Diclofenac Sodium   APPLY 4 G TOPICALLY TO THE APPROPRIATE AREA AS DIRECTED 2 (TWO) TIMES A DAY. SMALL AMOUNT TO AFFECTED AREA      cyanocobalamin 1000 MCG/ML injection   1,000 mcg, Intramuscular, Every 28 Days      Easy Touch FlipLock Needles 25G X 1-1/2\" misc  Generic drug: Needle (Disp)   1 each, Does not apply, Every 28 Days, For administering B12 cyanocobalomin. Dx vitamin B12 deficiency.      folic acid 1 MG tablet  Commonly known as: FOLVITE   TAKE 1 TABLET BY MOUTH EVERY DAY      furosemide 40 MG tablet  Commonly known as: LASIX   TAKE 1 TABLET BY MOUTH EVERY DAY      gabapentin 100 MG capsule  Commonly known as: NEURONTIN   100 mg, Oral, Every 12 Hours      glucose monitor monitoring kit   1 each, Does not apply, Daily, accucheck eve meter, E11.9      hydroCHLOROthiazide 12.5 MG tablet  Commonly known as: HYDRODIURIL   TAKE 1 TABLET BY MOUTH EVERY DAY      HYDROcodone-acetaminophen 7.5-325 MG per tablet  Commonly known as: NORCO   1 tablet, Oral, Every 6 Hours PRN      Hypodermic Needle 20G X 1\" misc   1 each, Does not apply, Every 28 Days, For drawing up B12 for injection monthly, change back to smaller gauge needle prior to injection. Dx Vitamin B12  Deficiency.      isosorbide mononitrate 30 MG 24 hr tablet  Commonly known as: IMDUR   TAKE 1 TABLET BY MOUTH EVERY DAY      levothyroxine 25 MCG tablet  Commonly known as: SYNTHROID, LEVOTHROID   25 mcg, Oral, Daily      meclizine 25 MG tablet  Commonly known as: ANTIVERT   25 mg, Oral, 3 Times Daily PRN      meloxicam 15 MG tablet  Commonly known as: MOBIC   TAKE 1 TABLET BY MOUTH EVERY DAY WITH FOOD      methocarbamol 500 MG tablet  Commonly known as: ROBAXIN   TAKE 1 TABLET BY MOUTH 2 TIMES A DAY AS NEEDED FOR MUSCLE SPASMS.      metoclopramide 10 MG tablet  Commonly " "known as: REGLAN   10 mg, Oral, 4 Times Daily PRN      metoclopramide 5 MG tablet  Commonly known as: REGLAN   5 mg, Oral, 3 Times Daily PRN      metoprolol succinate XL 50 MG 24 hr tablet  Commonly known as: TOPROL-XL   50 mg, Oral, Daily, Dose increased 5/24/21      naloxone 0.4 MG/ML injection  Commonly known as: NARCAN   0.2 mg, Intravenous, Every 5 Minutes PRN      nitroglycerin 0.4 MG SL tablet  Commonly known as: NITROSTAT   0.4 mg, Sublingual, Every 5 Minutes PRN, Take no more than 3 doses in 15 minutes.      NovoLOG FlexPen 100 UNIT/ML solution pen-injector sc pen  Generic drug: insulin aspart   Inject up to 45 units  3 TIMES A DAY BEFORE MEALS      ondansetron 8 MG tablet  Commonly known as: ZOFRAN   8 mg, Oral, Every 8 Hours PRN      ondansetron ODT 4 MG disintegrating tablet  Commonly known as: ZOFRAN-ODT   4 mg, Translingual, 4 Times Daily PRN      ONE TOUCH ULTRA MINI w/Device kit   Patient will need the Accu-Check Avitio meter      pantoprazole 20 MG EC tablet  Commonly known as: PROTONIX   40 mg, Oral, Daily      ranolazine 500 MG 12 hr tablet  Commonly known as: RANEXA   500 mg, Oral, Every 12 Hours Scheduled      Syringe 20G X 1-1/2\" 3 ML misc   1 each, Does not apply, Every 28 Days, For injection cyanocobalomin, Dx vit B12 deficiency.      venlafaxine XR 75 MG 24 hr capsule  Commonly known as: EFFEXOR-XR   75 mg, Oral, Daily With Breakfast      vitamin D 1.25 MG (13464 UT) capsule capsule  Commonly known as: ERGOCALCIFEROL   TAKE 1 CAPSULE BY MOUTH EVERY 7 DAYS.             Discharge Diet:   Diet Instructions     Diet: Consistent Carbohydrate, Cardiac      Discharge Diet:  Consistent Carbohydrate  Cardiac             Activity at Discharge:   Activity Instructions     Activity as Tolerated            Discharge Care Plan/Instructions:     Chest pain, history of CABG and PCI  -Somewhat atypical chest pain to midepigastric/right upper quadrant/right lower chest wall pain, resolved with 1 " nitroglycerin  - Long history of coronary artery disease with history of CABG and PCI  - Sees Dr. Snowden outpatient, will follow closely outpatient     Abdominal pain  - Continued pain right upper quadrant/right flank.  - UA negative  - CT abdomen showed mild pneumobilia but otherwise unremarkable  - Endoscopy done, recommendations per chart     Peptic ulcer disease, gastroparesis  -Protonix     Type 2 diabetes  -Sliding scale     Hypertension  Hyperlipidemia  Anxiety  Depression  GERD  Obesity  Ménière's         Follow-up Appointments:   Future Appointments   Date Time Provider Department Center   1/13/2023  8:15 AM Elvis Gamboa APRN [facility] END [facility]   1/18/2023 10:15 AM [facility] WOMEN CTR MAMM 1 [facility]   3/3/2023 10:00 AM Dolly Foss APRN [facility] PC POW [facility]   3/17/2023  9:00 AM MAD HVC ECHO ROOM 2 [facility] HVC CARD [place]   4/17/2023 10:30 AM Bill Gibson MD [facility] CD [facility] None   5/2/2023  9:00 AM Marcela Lawson APRN [facility] GE [facility]   6/20/2023  1:00 PM NURSE BH MAD BH MAD OPI [facility]   6/20/2023  1:30 PM Teressa Hammond APRN [facility] ONC [facility]       Test Results Pending at Discharge:   Pending Labs     Order Current Status    TISSUE EXAM, [facility]) In process            Copied text in this note has been reviewed and is accurate as of 1/11/2023     Time: 32 minutes

## 2023-01-11 NOTE — SIGNIFICANT NOTE
01/11/23 1418   OTHER   Discipline physical therapy assistant   Rehab Time/Intention   Session Not Performed patient/family declined treatment  (states that she is just waiting on her paperwork to go home.does not want to participate in physical therapy, just wants to wait on paperwork to go.no questions or concerns for PT at this time.)

## 2023-01-12 ENCOUNTER — TRANSITIONAL CARE MANAGEMENT TELEPHONE ENCOUNTER (OUTPATIENT)
Dept: CALL CENTER | Facility: HOSPITAL | Age: 63
End: 2023-01-12
Payer: COMMERCIAL

## 2023-01-12 NOTE — OUTREACH NOTE
Call Center TCM Note    Flowsheet Row Responses   [facility] patient discharged from? [place]   Does the patient have one of the following disease processes/diagnoses(primary or secondary)? Other   TCM attempt successful? No   Unsuccessful attempts Attempt 2          Anselmo Winn RN    1/12/2023, 14:41 CST

## 2023-01-12 NOTE — OUTREACH NOTE
Call Center TCM Note    Flowsheet Row Responses   [facility] patient discharged from? [place]   Does the patient have one of the following disease processes/diagnoses(primary or secondary)? Other   TCM attempt successful? No   Unsuccessful attempts Attempt 1  [no release]   Call Status --  [no voicemail set up]          Anselmo Winn RN    1/12/2023, 14:22 CST

## 2023-01-13 ENCOUNTER — TELEPHONE (OUTPATIENT)
Dept: ENDOCRINOLOGY | Facility: CLINIC | Age: 63
End: 2023-01-13

## 2023-01-13 ENCOUNTER — TRANSITIONAL CARE MANAGEMENT TELEPHONE ENCOUNTER (OUTPATIENT)
Dept: CALL CENTER | Facility: HOSPITAL | Age: 63
End: 2023-01-13
Payer: COMMERCIAL

## 2023-01-13 DIAGNOSIS — I25.118 CORONARY ARTERY DISEASE OF NATIVE ARTERY OF NATIVE HEART WITH STABLE ANGINA PECTORIS: Primary | Chronic | ICD-10-CM

## 2023-01-13 LAB — REF LAB TEST METHOD: NORMAL

## 2023-01-13 RX ORDER — HYDROCODONE BITARTRATE AND ACETAMINOPHEN 7.5; 325 MG/1; MG/1
1 TABLET ORAL EVERY 6 HOURS PRN
Qty: 120 TABLET | Refills: 0 | Status: SHIPPED | OUTPATIENT
Start: 2023-01-13 | End: 2023-02-08 | Stop reason: SDUPTHER

## 2023-01-13 RX ORDER — ISOSORBIDE MONONITRATE 30 MG/1
TABLET, EXTENDED RELEASE ORAL
Qty: 30 TABLET | Refills: 3 | Status: SHIPPED | OUTPATIENT
Start: 2023-01-13
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
## 2023-01-13 NOTE — OUTREACH NOTE
Call Center TCM Note    Flowsheet Row Responses   [facility] patient discharged from? [facility]   Does the patient have one of the following disease processes/diagnoses(primary or secondary)? Other   TCM attempt successful? Yes  [verbal release for spouse and sister on file]   Call start time 1455   Call end time 1458   Discharge diagnosis Chest pain, unspecified type  Gastroparesis   Meds reviewed with patient/caregiver? Yes   Is the patient having any side effects they believe may be caused by any medication additions or changes? No   Does the patient have all medications ordered at discharge? Yes   Prescription comments Taking carafate as ordered   Is the patient taking all medications as directed (includes completed medication regime)? Yes   Medication comments Has NTG for prn chest pain,  has not needed since discharge   Comments Hospital f/u with PCP on 1/18/23@230pm   Does the patient have an appointment with their PCP within 7 days of discharge? Yes   Has home health visited the patient within 72 hours of discharge? N/A   Psychosocial issues? No   Did the patient receive a copy of their discharge instructions? Yes   Nursing interventions Reviewed instructions with patient   What is the patient's perception of their health status since discharge? Same  [Reports she is the same--still have some stomach pain that radiates to back along with n/v. Understands return precautions. Taking carafate.  No questions today]   Is the patient/caregiver able to teach back signs and symptoms related to disease process for when to call PCP? Yes   Is the patient/caregiver able to teach back signs and symptoms related to disease process for when to call 911? Yes  [Reviewed cardiac s/s and need to seek care]   TCM call completed? Yes   Call end time [id number]          Juany Moore RN    1/13/2023, 15:01 CST

## 2023-01-17 ENCOUNTER — OFFICE VISIT (OUTPATIENT)
Dept: GASTROENTEROLOGY | Facility: CLINIC | Age: 63
End: 2023-01-17
Payer: COMMERCIAL

## 2023-01-17 ENCOUNTER — OFFICE VISIT (OUTPATIENT)
Dept: ORTHOPEDIC SURGERY | Facility: CLINIC | Age: 63
End: 2023-01-17
Payer: COMMERCIAL

## 2023-01-17 VITALS
HEART RATE: 63 BPM | HEIGHT: 68 IN | BODY MASS INDEX: 40.75 KG/M2 | SYSTOLIC BLOOD PRESSURE: 121 MMHG | DIASTOLIC BLOOD PRESSURE: 65 MMHG

## 2023-01-17 VITALS — HEIGHT: 68 IN | WEIGHT: 268 LBS | BODY MASS INDEX: 40.62 KG/M2

## 2023-01-17 DIAGNOSIS — R10.11 RIGHT UPPER QUADRANT ABDOMINAL PAIN: Primary | ICD-10-CM

## 2023-01-17 DIAGNOSIS — E53.8 CYANOCOBALAMIN DEFICIENCY: ICD-10-CM

## 2023-01-17 DIAGNOSIS — Z79.4 TYPE 2 DIABETES MELLITUS WITH DIABETIC AUTONOMIC NEUROPATHY, WITH LONG-TERM CURRENT USE OF INSULIN: Chronic | ICD-10-CM

## 2023-01-17 DIAGNOSIS — G62.9 NEUROPATHY: Chronic | ICD-10-CM

## 2023-01-17 DIAGNOSIS — M70.22 OLECRANON BURSITIS, LEFT ELBOW: Primary | ICD-10-CM

## 2023-01-17 DIAGNOSIS — E11.43 TYPE 2 DIABETES MELLITUS WITH DIABETIC AUTONOMIC NEUROPATHY, WITH LONG-TERM CURRENT USE OF INSULIN: Chronic | ICD-10-CM

## 2023-01-17 DIAGNOSIS — M25.522 LEFT ELBOW PAIN: ICD-10-CM

## 2023-01-17 PROCEDURE — 99214 OFFICE O/P EST MOD 30 MIN: CPT | Performed by: INTERNAL MEDICINE

## 2023-01-17 PROCEDURE — 20605 DRAIN/INJ JOINT/BURSA W/O US: CPT | Performed by: NURSE PRACTITIONER

## 2023-01-17 PROCEDURE — 99214 OFFICE O/P EST MOD 30 MIN: CPT | Performed by: NURSE PRACTITIONER

## 2023-01-17 RX ORDER — FAMOTIDINE 40 MG/1
40 TABLET, FILM COATED ORAL DAILY
Qty: 30 TABLET | Refills: 6 | Status: SHIPPED | OUTPATIENT
Start: 2023-01-17 | End: 2023-02-16

## 2023-01-17 RX ORDER — LIDOCAINE HYDROCHLORIDE 10 MG/ML
2 INJECTION, SOLUTION INFILTRATION; PERINEURAL
Status: COMPLETED | OUTPATIENT
Start: 2023-01-17 | End: 2023-01-17

## 2023-01-17 RX ORDER — FLUTICASONE PROPIONATE 50 MCG
SPRAY, SUSPENSION (ML) NASAL
COMMUNITY
Start: 2023-01-08 | End: 2023-02-01

## 2023-01-17 RX ORDER — GABAPENTIN 100 MG/1
100 CAPSULE ORAL EVERY 12 HOURS
Qty: 60 CAPSULE | Refills: 0 | Status: SHIPPED | OUTPATIENT
Start: 2023-01-17 | End: 2023-02-15

## 2023-01-17 RX ORDER — PREDNISONE 1 MG/1
TABLET ORAL
Qty: 15 TABLET | Refills: 0 | Status: SHIPPED | OUTPATIENT
Start: 2023-01-17

## 2023-01-17 RX ORDER — CYANOCOBALAMIN 1000 UG/ML
1000 INJECTION, SOLUTION INTRAMUSCULAR; SUBCUTANEOUS
Qty: 1 ML | Refills: 2 | Status: SHIPPED | OUTPATIENT
Start: 2023-01-17 | End: 2023-04-07

## 2023-01-17 RX ADMIN — LIDOCAINE HYDROCHLORIDE 2 ML: 10 INJECTION, SOLUTION INFILTRATION; PERINEURAL at 09:59

## 2023-01-17 NOTE — PROGRESS NOTES
"Ariana Martinez is a 63 y.o. female returns for     Chief Complaint   Patient presents with   • Left Elbow - Follow-up       HISTORY OF PRESENT ILLNESS:      Ms. Martinez is a 63-year-old female who presents today to follow-up on olecranon bursitis of left elbow that has now been present around 9 weeks.  She was given a therapeutic aspiration and steroid injection at last appointment.  This helped a little after procedure, however symptoms almost immediately returned.  No fever, nausea, vomiting.  No burning, tingling, numbness.       CONCURRENT MEDICAL HISTORY:    The following portions of the patient's history were reviewed and updated as appropriate: allergies, current medications, past family history, past medical history, past social history, past surgical history and problem list.     ROS  No fevers or chills.  No chest pain or shortness of air.  No GI or  disturbances.  Left olecranon bursitis    PHYSICAL EXAMINATION:       Ht 172.7 cm (68\")   Wt 122 kg (268 lb)   BMI 40.75 kg/m²     Physical Exam  Vitals and nursing note reviewed.   Constitutional:       General: She is not in acute distress.     Appearance: She is well-developed. She is not toxic-appearing or diaphoretic.   HENT:      Head: Normocephalic.   Eyes:      General: No scleral icterus.  Pulmonary:      Effort: Pulmonary effort is normal. No respiratory distress.   Skin:     General: Skin is warm and dry.   Neurological:      Mental Status: She is alert and oriented to person, place, and time.   Psychiatric:         Behavior: Behavior normal.         Thought Content: Thought content normal.         Judgment: Judgment normal.         GAIT:     []  Normal  []  Antalgic    Assistive device: []  None  []  Walker     []  Crutches  []  Cane     [x]  Wheelchair  []  Stretcher    Left Elbow Exam     Tenderness   Left elbow tenderness location: olecranon bursa      Range of Motion   The patient has normal left elbow ROM.    Other   Erythema: " absent  Sensation: normal  Pulse: present    Comments:  No evidence of infection.  Mild olecranon bursitis.  Neurovascular intact.  No change in exam.                EXAM: XR ELBOW 3 VIEWS LEFT     HISTORY: eval effusion, M25.422 Effusion, left elbow.     COMPARISON: None     FINDINGS:     Mineralization: Mild osseous demineralization     Bones: Capitellar lucency with mild irregularity of the overlying  cortex. No visualized fracture. Small cysts in the proximal ulna.  Small posterior olecranon enthesophyte     Soft tissues: Posterior elbow soft tissue swelling. No  significant effusion.     IMPRESSION:     Posterior elbow soft tissue swelling, likely olecranon bursitis,  which could be related to gout, infection or trauma     Radiocapitellar degenerative changes     Electronically signed by:  Gaurav Carreon DO  12/11/2022 9:16 AM  CST Workstation: [id number]        ASSESSMENT:    Diagnoses and all orders for this visit:    Olecranon bursitis, left elbow    Left elbow pain    Other orders  -     Medium Joint Arthrocentesis: L olecranon bursa  -     predniSONE (DELTASONE) 5 MG tablet; Take twice daily for first 5 days then once daily until gone.          PLAN      Medium Joint Arthrocentesis: L olecranon bursa  Date/Time: 1/17/2023 9:59 AM  Consent given by: patient  Timeout: Immediately prior to procedure a time out was called to verify the correct patient, procedure, equipment, support staff and site/side marked as required   Supporting Documentation  Indications: pain and joint swelling   Procedure Details  Location: elbow - L olecranon bursa  Needle size: 18 G  Medications administered: 2 mL lidocaine 1 %  Aspirate amount: 7 mL  Aspirate: yellow  Patient tolerance: patient tolerated the procedure well with no immediate complications          After discussing risk, benefits, alternatives between physical therapy and repeat aspiration, patient desires to proceed with repeat aspiration.  Patient tolerated this  well.  Steroid was not injected as steroid was given into bursa last week.  However after discussing risk and benefits and how to take medication properly and need for increased blood sugar monitoring, patient desires to proceed with oral steroid today.  Patient has taken oral steroids in the past and tolerated them well.  Patient instructed to perform RICE therapy.  Patient is to ice the elbow for 20 minutes every 2 hours while awake.  Patient is wrapped in Ace bandage and is to use this for the next 1 to 2 days.  Signs and symptoms to report including when to seek care explained.  Patient is to return in 2 weeks for recheck, if not improving or if symptoms improve and then return, we will likely send patient to physical therapy.    Return in about 2 weeks (around 1/31/2023).        EMR Dragon/Transciption Disclaimer: Some of this note may be an electronic transcription/translation of spoken language to printed text.  The electronic translation of spoken language may permit erroneous, or at times, nonsensical words or phrases to be inadvertently transcribed. Although I have reviewed the note for such errors, some may still exist.     This document has been electronically signed by Meera SPENCE on January 17, 2023 10:18 CST

## 2023-01-18 RX ORDER — FUROSEMIDE 40 MG/1
TABLET ORAL
Qty: 30 TABLET | Refills: 0 | Status: SHIPPED | OUTPATIENT
Start: 2023-01-18 | End: 2023-02-10

## 2023-01-24 ENCOUNTER — HOSPITAL ENCOUNTER (OUTPATIENT)
Dept: ULTRASOUND IMAGING | Facility: HOSPITAL | Age: 63
Discharge: HOME | End: 2023-01-24
Payer: COMMERCIAL

## 2023-01-24 DIAGNOSIS — R10.11 RIGHT UPPER QUADRANT ABDOMINAL PAIN: ICD-10-CM

## 2023-01-24 DIAGNOSIS — R34 DECREASED URINE OUTPUT: ICD-10-CM

## 2023-01-24 PROCEDURE — 76705 ECHO EXAM OF ABDOMEN: CPT

## 2023-01-24 PROCEDURE — 51798 US URINE CAPACITY MEASURE: CPT

## 2023-01-27 ENCOUNTER — READMISSION MANAGEMENT (OUTPATIENT)
Dept: CALL CENTER | Facility: HOSPITAL | Age: 63
End: 2023-01-27
Payer: COMMERCIAL

## 2023-01-27 RX ORDER — HYDROCHLOROTHIAZIDE 12.5 MG/1
TABLET ORAL
Qty: 30 TABLET | Refills: 1 | Status: SHIPPED | OUTPATIENT
Start: 2023-01-27 | End: 2023-02-20

## 2023-01-27 NOTE — OUTREACH NOTE
Medical Week 3 Survey    Flowsheet Row Responses   [facility] patient discharged from? [place]   Does the patient have one of the following disease processes/diagnoses(primary or secondary)? Other   Week 3 attempt successful? Yes   Call start time 1227   Call end time 1231   Is the patient taking all medications as directed (includes completed medication regime)? Yes   Comments regarding appointments Pt reports having tests completed on January 26, 2023. Results pending.   Has the patient kept scheduled appointments due by today? Yes   What is the patient's perception of their health status since discharge? Improving   Week 3 Call Completed? Yes   Wrap up additional comments Pt reports feeling the same. Pt has occassional cp, cardiac origin has been r/o. PT did completes testing on January 26, 2023, results pending.          VANESSA BEYER - Registered Nurse

## 2023-02-01 DIAGNOSIS — J30.2 SEASONAL ALLERGIC RHINITIS, UNSPECIFIED TRIGGER: Primary | Chronic | ICD-10-CM

## 2023-02-01 RX ORDER — FLUTICASONE PROPIONATE 50 MCG
SPRAY, SUSPENSION (ML) NASAL
Qty: 16 ML | Refills: 5 | Status: SHIPPED | OUTPATIENT
Start: 2023-02-01

## 2023-02-04 NOTE — PROGRESS NOTES
Chief Complaint   Patient presents with   • Anemia   • gastritis   • gastroperesis       Subjective    Ariana Martinez is a 63 y.o. female.    History of Present Illness  Patient presented to GI clinic for follow-up visit today.  Has intermittent chest pain at night.  Symptoms are worse with cough.  Also has epigastric pain with right upper quadrant pain and nausea.  Denied vomiting, diarrhea, constipation, rectal bleeding or weight loss.  Taking Reglan 4 times daily.  EGD was consistent with esophagitis, gastritis and retained food in the stomach.       The following portions of the patient's history were reviewed and updated as appropriate:   Past Medical History:   Diagnosis Date   • Acute bacterial endocarditis 3/13/2021   • Angina, class IV ([facility])    • Anxiety    • Anxiety and depression    • Arthritis    • Benign paroxysmal positional vertigo    • Bladder disorder     has bladder stimulator   • Carpal tunnel syndrome    • CHF (congestive heart failure) ([facility])    • Chronic pain    • Coronary atherosclerosis     hx CABG 2005.  is followed by Dr Kwon   • Depression    • Diabetes mellitus ([facility])     Type 2, controlled   • Diabetic polyneuropathy ([facility])    • Disease of thyroid gland    • Elevated cholesterol    • Female stress incontinence    • Foot pain, left    • Full dentures    • Gastroparesis    • GERD (gastroesophageal reflux disease)    • Hyperlipidemia    • Hypertension    • Low back pain    • Malaise and fatigue    • Multiple joint pain    • Obesity     Refuses to be weighed   • Occlusion and stenosis of bilateral carotid arteries 6/18/2021   • Otalgia     Both   • Perforation of tympanic membrane     Left   • Postoperative wound infection    • Vitamin D deficiency    • Wears glasses     reading     Past Surgical History:   Procedure Laterality Date   • ABDOMINAL SURGERY     • AMPUTATION FOOT     • ANGIOPLASTY      coronary   • ANKLE ARTHROSCOPY Left 02/26/2021    Procedure: Left foot hardware removal,  ankle arthroscopy, bone grafting , left foot exostectomy;  Surgeon: Ignacio Lord DPM;  Location: [facility] OR;  Service: Podiatry;  Laterality: Left;   • BREAST BIOPSY Right    • CARDIAC CATHETERIZATION     • CARDIAC CATHETERIZATION N/A 06/20/2017    Procedure: Right Heart Cath;  Surgeon: Can Kwon MD PhD;  Location: [facility] CATH INVASIVE LOCATION;  Service:    • CARDIAC CATHETERIZATION N/A 02/18/2020    Procedure: Left Heart Cath;  Surgeon: Catalina Cooper MD;  Location: [facility] CATH INVASIVE LOCATION;  Service: Cardiology;  Laterality: N/A;   • CARDIAC CATHETERIZATION N/A 04/06/2022    Procedure: Left Heart Cath;  Surgeon: Sheryl Navas MD;  Location: [facility] CATH INVASIVE LOCATION;  Service: Cardiology;  Laterality: N/A;   • CARPAL TUNNEL RELEASE     • CHOLECYSTECTOMY     • COLONOSCOPY N/A 06/24/2020    Procedure: COLONOSCOPY;  Surgeon: Julián Maldonado MD;  Location: [facility] ENDOSCOPY;  Service: Gastroenterology;  Laterality: N/A;   • CORONARY ARTERY BYPASS GRAFT  2005   • ENDOSCOPY N/A 10/19/2018    Procedure: ESOPHAGOGASTRODUODENOSCOPY possible dilation;  Surgeon: Julián Maldonado MD;  Location: [facility] ENDOSCOPY;  Service: Gastroenterology   • ENDOSCOPY N/A 06/24/2020    Procedure: ESOPHAGOGASTRODUODENOSCOPY WED appt please;  Surgeon: Julián Maldonado MD;  Location: [facility] ENDOSCOPY;  Service: Gastroenterology;  Laterality: N/A;   • ENDOSCOPY N/A 06/10/2022    Procedure: ESOPHAGOGASTRODUODENOSCOPY   room 380;  Surgeon: Jeremiah Wilkins MD;  Location: [facility] ENDOSCOPY;  Service: Gastroenterology;  Laterality: N/A;   • ENDOSCOPY N/A 1/10/2023    Procedure: ESOPHAGOGASTRODUODENOSCOPY;  Surgeon: Jeremiah Wilkins MD;  Location: [facility] ENDOSCOPY;  Service: Gastroenterology;  Laterality: N/A;   • ENDOSCOPY AND COLONOSCOPY     • FOOT SURGERY      Toes   • FOOT SURGERY     • GASTRIC BANDING      Revision, laparoscopic   • HYSTERECTOMY     • INCISION AND DRAINAGE LEG Left 03/12/2021    Procedure:  Left ankle arthroscopic irrigation and debridement, screw removal;  Surgeon: Ignacio Lord DPM;  Location: [facility];  Service: Podiatry;  Laterality: Left;   • MOUTH SURGERY     • SALPINGO OOPHORECTOMY     • SHOULDER SURGERY     • SUBTALAR ARTHRODESIS Left 2019    Procedure: LEFT FOOT HARDWARE REMOVAL, FIFTH METATARSAL , OPEN REDUCTION INTERNAL FIXATION, CALCANEAL OSTEOTOMY;  Surgeon: Ignacio Lord DPM;  Location: [facility] OR;  Service: Podiatry   • SUBTALAR ARTHRODESIS Left 10/16/2019    Procedure: foot hardware removal, subtalar joint fusion  possible de/reattachment of achilles tendon        (c-arm);  Surgeon: Ignacio Lord DPM;  Location: [facility] OR;  Service: Podiatry   • SUBTALAR ARTHRODESIS Left 2020    Procedure: subtalar, talonavicular joint arthrodesis.  Removal hardware.          (c-arm);  Surgeon: Ignacio Lord DPM;  Location: [facility];  Service: Podiatry;  Laterality: Left;   • TRANSESOPHAGEAL ECHOCARDIOGRAM (LAMONTE)      With color flow     Family History   Problem Relation Age of Onset   • Diabetes Other    • Heart disease Other    • Hypertension Other    • Heart disease Mother    • Stroke Mother    • Hypertension Mother    • Diabetes Sister    • Heart disease Sister    • Hypertension Sister    • Heart disease Sister    • Diabetes Sister    • Hypertension Sister    • Diabetes Sister    • Diabetes Sister    • Diabetes Sister    • Diabetes Sister      OB History        0    Para   0    Term   0       0    AB   0    Living   0       SAB   0    IAB   0    Ectopic   0    Molar        Multiple   0    Live Births                  Prior to Admission medications    Medication Sig Start Date End Date Taking? Authorizing Provider   amLODIPine (NORVASC) 5 MG tablet Take 1 tablet by mouth Daily. 22  Yes Kimberly Ferguson APRN   ARIPiprazole (ABILIFY) 5 MG tablet TAKE 1 TABLET BY MOUTH EVERY DAY 22  Yes Dolly Foss APRN   aspirin  MG tablet Take  1 tablet by mouth Daily. 2/8/22  Yes Mahin Esteves MD   atorvastatin (LIPITOR) 80 MG tablet Take 1 tablet by mouth Daily. 9/7/22  Yes Dolly Foss APRN   B-EVY ULTRAFINE III SHORT PEN 31G X 8 MM misc USE TO INJECT 5 TIMES A DAY 11/11/22  Yes Elvis Gamboa APRN   B-EVY ULTRAFINE III SHORT PEN 31G X 8 MM misc USE UP TO 4 (FOUR) TIMES DAILY WITH INSULIN AS DIRECTED. 12/27/22  Yes Dolly Foss APRN   BD SHARPS CONTAINER HOME misc 1 each Take As Directed. 9/7/18  Yes Francisca Fong MD   Blood Glucose Monitoring Suppl (ONE TOUCH ULTRA MINI) w/Device kit Patient will need the Accu-Check Avitio meter 10/18/21  Yes Kimberly Ferguson APRN   butalbital-acetaminophen-caffeine (FIORICET, ESGIC) -40 MG per tablet Take one to two tablets by mouth per headache episode as needed. 1/4/23  Yes Dolly Foss APRN   Calcium Citrate-Vitamin D 250-200 MG-UNIT tablet Take 2 tablets by mouth 2 (two) times a day.   Yes Provider, MD Esther   clopidogrel (PLAVIX) 75 MG tablet Take 1 tablet by mouth Daily. 10/3/22  Yes Dolly Foss APRN   [facility] Diclofenac Sodium 1 % gel gel APPLY 4 G TOPICALLY TO THE APPROPRIATE AREA AS DIRECTED 2 (TWO) TIMES A DAY. SMALL AMOUNT TO AFFECTED AREA 1/3/23  Yes Rohan Lazo MD   cyanocobalamin 1000 MCG/ML injection INJECT 1 ML INTO THE APPROPRIATE MUSCLE AS DIRECTED BY PRESCRIBER EVERY 28 (TWENTY-EIGHT) DAYS. 1/17/23  Yes Dolly Foss APRN   folic acid (FOLVITE) 1 MG tablet TAKE 1 TABLET BY MOUTH EVERY DAY 8/22/22  Yes Teressa Hammond APRN   glucose blood (Accu-Chek Guide) test strip 1 each by Other route 4 (Four) Times a Day. To test blood sugar 4X daily. For  Dx E11.65 5/2/22  Yes Kimberly Ferguson APRN   glucose monitor monitoring kit 1 each Daily. accucheck eve meter, E11.9 6/11/20  Yes Elvis Gamboa APRN   HYDROcodone-acetaminophen (NORCO) 7.5-325 MG per tablet Take 1 tablet by mouth Every 6 (Six) Hours As Needed for Moderate Pain.  "1/13/23  Yes Dolly Foss APRN   insulin aspart (NovoLOG FlexPen) 100 UNIT/ML solution pen-injector sc pen Inject up to 45 units  3 TIMES A DAY BEFORE MEALS 10/3/22  Yes Elvis Gamboa APRN   Insulin Glargine (BASAGLAR KWIKPEN) 100 UNIT/ML injection pen Inject 40 units into the appropriate area every morning and 35 units into the appropriate area every night 10/24/22  Yes Elvis Gamboa APRN   isosorbide mononitrate (IMDUR) 30 MG 24 hr tablet TAKE 1 TABLET BY MOUTH EVERY DAY 1/13/23  Yes Dolly Foss APRN   Lancets (accu-chek soft touch) lancets As directed 10/18/21  Yes Kimberly Ferguson APRN   levothyroxine (SYNTHROID, LEVOTHROID) 25 MCG tablet Take 1 tablet by mouth Daily. 10/3/22  Yes Elvis Gamboa APRN   meclizine (ANTIVERT) 25 MG tablet Take 1 tablet by mouth 3 (Three) Times a Day As Needed for dizziness. 12/14/17  Yes Evon Hernandez APRN   meloxicam (MOBIC) 15 MG tablet TAKE 1 TABLET BY MOUTH EVERY DAY WITH FOOD 12/7/22  Yes Rohan Lazo MD   methocarbamol (ROBAXIN) 500 MG tablet TAKE 1 TABLET BY MOUTH 2 TIMES A DAY AS NEEDED FOR MUSCLE SPASMS. 6/7/22  Yes Kimberly Ferguson APRN   metoclopramide (REGLAN) 10 MG tablet TAKE 1 TABLET BY MOUTH 4 (FOUR) TIMES A DAY AS NEEDED (NAUSEA). 11/29/22  Yes Dolly Foss APRN   metoclopramide (REGLAN) 5 MG tablet Take 1 tablet by mouth 3 (Three) Times a Day As Needed (vomiting). 12/27/22  Yes Addi Johnson MD   metoprolol succinate XL (TOPROL-XL) 50 MG 24 hr tablet Take 1 tablet by mouth Daily. Dose increased 5/24/21 12/27/22  Yes Dolly Foss APRN   naloxone (NARCAN) 0.4 MG/ML injection Infuse 0.5 mL into a venous catheter Every 5 (Five) Minutes As Needed for Opioid Reversal. 3/13/21  Yes Nick Faye MD   Needle, Disp, (Easy Touch FlipLock Needles) 25G X 1-1/2\" misc 1 each Every 28 (Twenty-Eight) Days. For administering B12 cyanocobalomin. Dx vitamin B12 deficiency. 5/24/22  Yes Kimberly Ferguson " "BEBE [place]   Needle, Disp, (Hypodermic Needle) 20G X 1\" misc 1 each Every 28 (Twenty-Eight) Days. For drawing up B12 for injection monthly, change back to smaller gauge needle prior to injection. Dx Vitamin B12  Deficiency. 5/24/22  Yes Kimberly Ferguson APRN   nitroglycerin (NITROSTAT) 0.4 MG SL tablet Place 1 tablet under the tongue Every 5 (Five) Minutes As Needed for Chest Pain. Take no more than 3 doses in 15 minutes. 4/26/22  Yes Kimberly Ferguson APRN   ondansetron (ZOFRAN) 8 MG tablet Take 1 tablet by mouth Every 8 (Eight) Hours As Needed for Nausea or Vomiting. 12/7/22  Yes Dolly Foss APRN   ondansetron ODT (ZOFRAN-ODT) 4 MG disintegrating tablet Place 1 tablet on the tongue 4 (Four) Times a Day As Needed for Nausea or Vomiting. 12/27/22  Yes Addi Johnson MD   pantoprazole (PROTONIX) 20 MG EC tablet Take 2 tablets by mouth Daily. 7/12/22  Yes Kimberly Ferguson APRN   predniSONE (DELTASONE) 5 MG tablet Take twice daily for first 5 days then once daily until gone. 1/17/23  Yes Meera Morales APRN   ranolazine (RANEXA) 500 MG 12 hr tablet Take 1 tablet by mouth Every 12 (Twelve) Hours. 6/29/22  Yes Bill Gibson MD   sucralfate (Carafate) 1 g tablet Take 1 tablet by mouth 4 (Four) Times a Day. 1/11/23  Yes Marleny Montoya PA-C   Syringe 20G X 1-1/2\" 3 ML misc 1 each Every 28 (Twenty-Eight) Days. For injection cyanocobalomin, Dx vit B12 deficiency. 5/24/22  Yes Kimberly Ferguson APRN   venlafaxine XR (EFFEXOR-XR) 75 MG 24 hr capsule TAKE 1 CAPSULE BY MOUTH DAILY WITH BREAKFAST. 12/14/22  Yes Dolly Foss APRN   vitamin D (ERGOCALCIFEROL) 1.25 MG (84101 UT) capsule capsule TAKE 1 CAPSULE BY MOUTH EVERY 7 DAYS. 12/29/22  Yes Dolly Foss APRN   famotidine (Pepcid) 40 MG tablet Take 1 tablet by mouth Daily for 30 days. 1/17/23 2/16/23  Jeremiah Wilkins MD   fluticasone (FLONASE) 50 MCG/ACT nasal spray INSTILL 2 SPRAYS IN EACH NOSTRIL AS DIRECTED BY PROVIDER DAILY 2/1/23  "  Dolly Foss APRN   furosemide (LASIX) 40 MG tablet TAKE 1 TABLET BY MOUTH EVERY DAY 1/18/23   Dolly Foss APRN   gabapentin (NEURONTIN) 100 MG capsule Take 1 capsule by mouth Every 12 (Twelve) Hours. 1/17/23   Dolly Foss APRN   hydroCHLOROthiazide (HYDRODIURIL) 12.5 MG tablet TAKE 1 TABLET BY MOUTH EVERY DAY 1/27/23   Dolly Foss APRN     Allergies   Allergen Reactions   • Seroquel [Quetiapine] Anaphylaxis   • Avandia [Rosiglitazone] Swelling   • Morphine And Related Hallucinations   • Oxycodone Hallucinations     Social History     Socioeconomic History   • Marital status:    Tobacco Use   • Smoking status: Never   • Smokeless tobacco: Never   Vaping Use   • Vaping Use: Never used   Substance and Sexual Activity   • Alcohol use: No   • Drug use: No   • Sexual activity: Defer       Review of Systems  Review of Systems   Constitutional: Negative for chills, fatigue, fever and unexpected weight change.   HENT: Negative for congestion, ear discharge, hearing loss, nosebleeds and sore throat.    Eyes: Negative for pain, discharge and redness.   Respiratory: Negative for cough, chest tightness, shortness of breath and wheezing.    Cardiovascular: Positive for chest pain. Negative for palpitations.   Gastrointestinal: Positive for abdominal pain and nausea. Negative for abdominal distention, blood in stool, constipation, diarrhea and vomiting.   Endocrine: Negative for cold intolerance, polydipsia, polyphagia and polyuria.   Genitourinary: Negative for dysuria, flank pain, frequency, hematuria and urgency.   Musculoskeletal: Negative for arthralgias, back pain, joint swelling and myalgias.   Skin: Negative for color change, pallor and rash.   Neurological: Negative for tremors, seizures, syncope, weakness and headaches.   Hematological: Negative for adenopathy. Does not bruise/bleed easily.   Psychiatric/Behavioral: Negative for behavioral problems, confusion, dysphoric mood, hallucinations  "and suicidal ideas. The patient is not nervous/anxious.         /65   Pulse 63   Ht 172.7 cm (68\")   BMI 40.75 kg/m²     Objective    Physical Exam  Constitutional:       Appearance: She is well-developed.   HENT:      Head: Normocephalic and atraumatic.   Eyes:      Conjunctiva/sclera: Conjunctivae normal.      Pupils: Pupils are equal, round, and reactive to light.   Neck:      Thyroid: No thyromegaly.   Cardiovascular:      Rate and Rhythm: Normal rate and regular rhythm.      Heart sounds: Normal heart sounds. No murmur heard.  Pulmonary:      Effort: Pulmonary effort is normal.      Breath sounds: Normal breath sounds. No wheezing.   Abdominal:      General: Bowel sounds are normal. There is no distension.      Palpations: Abdomen is soft. There is no mass.      Tenderness: There is no abdominal tenderness.      Hernia: No hernia is present.   Genitourinary:     Comments: No lesions noted  Musculoskeletal:         General: No tenderness. Normal range of motion.      Cervical back: Normal range of motion and neck supple.   Lymphadenopathy:      Cervical: No cervical adenopathy.   Skin:     General: Skin is warm and dry.      Findings: No rash.   Neurological:      Mental Status: She is alert and oriented to person, place, and time.      Cranial Nerves: No cranial nerve deficit.   Psychiatric:         Thought Content: Thought content normal.       Admission on 01/06/2023, Discharged on 01/11/2023   Component Date Value Ref Range Status   • QT Interval 01/06/2023 412  ms Final   • QTC Interval 01/06/2023 428  ms Final   • Troponin T 01/06/2023 <0.010  0.000 - 0.030 ng/mL Final   • Troponin T 01/06/2023 <0.010  0.000 - 0.030 ng/mL Final   • Glucose 01/06/2023 204 (H)  65 - 99 mg/dL Final   • BUN 01/06/2023 13  8 - 23 mg/dL Final   • Creatinine 01/06/2023 1.02 (H)  0.57 - 1.00 mg/dL Final   • Sodium 01/06/2023 139  136 - 145 mmol/L Final   • Potassium 01/06/2023 3.6  3.5 - 5.2 mmol/L Final   • Chloride " 01/06/2023 100  98 - 107 mmol/L Final   • CO2 01/06/2023 30.0 (H)  22.0 - 29.0 mmol/L Final   • Calcium 01/06/2023 9.6  8.6 - 10.5 mg/dL Final   • Total Protein 01/06/2023 7.1  6.0 - 8.5 g/dL Final   • Albumin 01/06/2023 4.1  3.5 - 5.2 g/dL Final   • ALT (SGPT) 01/06/2023 18  1 - 33 U/L Final   • AST (SGOT) 01/06/2023 14  1 - 32 U/L Final   • Alkaline Phosphatase 01/06/2023 120 (H)  39 - 117 U/L Final   • Total Bilirubin 01/06/2023 0.3  0.0 - 1.2 mg/dL Final   • Globulin 01/06/2023 3.0  gm/dL Final   • A/G Ratio 01/06/2023 1.4  g/dL Final   • BUN/Creatinine Ratio 01/06/2023 12.7  7.0 - 25.0 Final   • Anion Gap 01/06/2023 9.0  5.0 - 15.0 mmol/L Final   • eGFR 01/06/2023 62.7  >60.0 mL/min/1.73 Final    National Kidney Foundation and American Society of Nephrology (ASN) Task Force recommended calculation based on the Chronic Kidney Disease Epidemiology Collaboration (CKD-EPI) equation refit without adjustment for race.   • proBNP 01/06/2023 246.8  0.0 - 900.0 pg/mL Final   • Extra Tube 01/06/2023 Hold for add-ons.   Final    Auto resulted.   • Extra Tube 01/06/2023 hold for add-on   Final    Auto resulted   • Extra Tube 01/06/2023 Hold for add-ons.   Final    Auto resulted.   • Extra Tube 01/06/2023 Hold for add-ons.   Final    Auto resulted   • WBC 01/06/2023 12.39 (H)  3.40 - 10.80 10*3/mm3 Final   • RBC 01/06/2023 4.50  3.77 - 5.28 10*6/mm3 Final   • Hemoglobin 01/06/2023 12.9  12.0 - 15.9 g/dL Final   • Hematocrit 01/06/2023 39.4  34.0 - 46.6 % Final   • MCV 01/06/2023 87.6  79.0 - 97.0 fL Final   • MCH 01/06/2023 28.7  26.6 - 33.0 pg Final   • MCHC 01/06/2023 32.7  31.5 - 35.7 g/dL Final   • RDW 01/06/2023 13.2  12.3 - 15.4 % Final   • RDW-SD 01/06/2023 41.6  37.0 - 54.0 fl Final   • MPV 01/06/2023 9.1  6.0 - 12.0 fL Final   • Platelets 01/06/2023 421  140 - 450 10*3/mm3 Final   • Neutrophil % 01/06/2023 71.2  42.7 - 76.0 % Final   • Lymphocyte % 01/06/2023 17.8 (L)  19.6 - 45.3 % Final   • Monocyte % 01/06/2023  7.4  5.0 - 12.0 % Final   • Eosinophil % 01/06/2023 2.9  0.3 - 6.2 % Final   • Basophil % 01/06/2023 0.4  0.0 - 1.5 % Final   • Immature Grans % 01/06/2023 0.3  0.0 - 0.5 % Final   • Neutrophils, Absolute 01/06/2023 8.82 (H)  1.70 - 7.00 10*3/mm3 Final   • Lymphocytes, Absolute 01/06/2023 2.20  0.70 - 3.10 10*3/mm3 Final   • Monocytes, Absolute 01/06/2023 0.92 (H)  0.10 - 0.90 10*3/mm3 Final   • Eosinophils, Absolute 01/06/2023 0.36  0.00 - 0.40 10*3/mm3 Final   • Basophils, Absolute 01/06/2023 0.05  0.00 - 0.20 10*3/mm3 Final   • Immature Grans, Absolute 01/06/2023 0.04  0.00 - 0.05 10*3/mm3 Final   • nRBC 01/06/2023 0.0  0.0 - 0.2 /100 WBC Final   • Glucose 01/06/2023 145 (H)  70 - 130 mg/dL Final    RN NotifiedOperator: [id number] DIAZ OLUguilherme ID: [id number]   • Glucose 01/06/2023 137 (H)  70 - 130 mg/dL Final    RN NotifiedOperator: [id number] BERRIOSCHRISTY TELLEZDeb ID: [id number]   • Glucose 01/07/2023 212 (H)  65 - 99 mg/dL Final   • BUN 01/07/2023 12  8 - 23 mg/dL Final   • Creatinine 01/07/2023 0.97  0.57 - 1.00 mg/dL Final   • Sodium 01/07/2023 139  136 - 145 mmol/L Final   • Potassium 01/07/2023 3.6  3.5 - 5.2 mmol/L Final   • Chloride 01/07/2023 101  98 - 107 mmol/L Final   • CO2 01/07/2023 28.0  22.0 - 29.0 mmol/L Final   • Calcium 01/07/2023 9.2  8.6 - 10.5 mg/dL Final   • BUN/Creatinine Ratio 01/07/2023 12.4  7.0 - 25.0 Final   • Anion Gap 01/07/2023 10.0  5.0 - 15.0 mmol/L Final   • eGFR 01/07/2023 66.6  >60.0 mL/min/1.73 Final    National Kidney Foundation and American Society of Nephrology (ASN) Task Force recommended calculation based on the Chronic Kidney Disease Epidemiology Collaboration (CKD-EPI) equation refit without adjustment for race.   • Magnesium 01/07/2023 1.8  1.6 - 2.4 mg/dL Final   • WBC 01/07/2023 9.28  3.40 - 10.80 10*3/mm3 Final   • RBC 01/07/2023 4.26  3.77 - 5.28 10*6/mm3 Final   • Hemoglobin 01/07/2023 12.2  12.0 - 15.9 g/dL Final   • Hematocrit 01/07/2023 37.5  34.0 -  46.6 % Final   • MCV 01/07/2023 88.0  79.0 - 97.0 fL Final   • MCH 01/07/2023 28.6  26.6 - 33.0 pg Final   • MCHC 01/07/2023 32.5  31.5 - 35.7 g/dL Final   • RDW 01/07/2023 13.3  12.3 - 15.4 % Final   • RDW-SD 01/07/2023 42.8  37.0 - 54.0 fl Final   • MPV 01/07/2023 9.1  6.0 - 12.0 fL Final   • Platelets 01/07/2023 373  140 - 450 10*3/mm3 Final   • Neutrophil % 01/07/2023 66.7  42.7 - 76.0 % Final   • Lymphocyte % 01/07/2023 22.6  19.6 - 45.3 % Final   • Monocyte % 01/07/2023 6.3  5.0 - 12.0 % Final   • Eosinophil % 01/07/2023 3.7  0.3 - 6.2 % Final   • Basophil % 01/07/2023 0.4  0.0 - 1.5 % Final   • Immature Grans % 01/07/2023 0.3  0.0 - 0.5 % Final   • Neutrophils, Absolute 01/07/2023 6.19  1.70 - 7.00 10*3/mm3 Final   • Lymphocytes, Absolute 01/07/2023 2.10  0.70 - 3.10 10*3/mm3 Final   • Monocytes, Absolute 01/07/2023 0.58  0.10 - 0.90 10*3/mm3 Final   • Eosinophils, Absolute 01/07/2023 0.34  0.00 - 0.40 10*3/mm3 Final   • Basophils, Absolute 01/07/2023 0.04  0.00 - 0.20 10*3/mm3 Final   • Immature Grans, Absolute 01/07/2023 0.03  0.00 - 0.05 10*3/mm3 Final   • nRBC 01/07/2023 0.0  0.0 - 0.2 /100 WBC Final   • Glucose 01/07/2023 214 (H)  70 - 130 mg/dL Final    RN NotifiedOperator: [id number] BERRIOSCHRISTY SEOBanner Estrella Medical CenterMeter ID: [id number]   • Glucose 01/07/2023 219 (H)  70 - 130 mg/dL Final    RN NotifiedOperator: [id number] JOE RAINESguilherme ID: [id number]   • Glucose 01/07/2023 184 (H)  70 - 130 mg/dL Final    RN NotifiedOperator: [id number] JOE GUILLAUMEAMBIKANIKHILguilherme ID: [id number]   • Glucose 01/07/2023 124  70 - 130 mg/dL Final    RN NotifiedOperator: [id number] KLARISSA LIPSCOMBpastora ID: [id number]   • Glucose 01/08/2023 111 (H)  65 - 99 mg/dL Final   • BUN 01/08/2023 14  8 - 23 mg/dL Final   • Creatinine 01/08/2023 1.10 (H)  0.57 - 1.00 mg/dL Final   • Sodium 01/08/2023 139  136 - 145 mmol/L Final   • Potassium 01/08/2023 3.5  3.5 - 5.2 mmol/L Final   • Chloride 01/08/2023 100  98 - 107 mmol/L Final   • CO2  01/08/2023 28.0  22.0 - 29.0 mmol/L Final   • Calcium 01/08/2023 9.4  8.6 - 10.5 mg/dL Final   • BUN/Creatinine Ratio 01/08/2023 12.7  7.0 - 25.0 Final   • Anion Gap 01/08/2023 11.0  5.0 - 15.0 mmol/L Final   • eGFR 01/08/2023 57.3 (L)  >60.0 mL/min/1.73 Final    National Kidney Foundation and American Society of Nephrology (ASN) Task Force recommended calculation based on the Chronic Kidney Disease Epidemiology Collaboration (CKD-EPI) equation refit without adjustment for race.   • Magnesium 01/08/2023 2.0  1.6 - 2.4 mg/dL Final   • WBC 01/08/2023 9.27  3.40 - 10.80 10*3/mm3 Final   • RBC 01/08/2023 4.64  3.77 - 5.28 10*6/mm3 Final   • Hemoglobin 01/08/2023 13.2  12.0 - 15.9 g/dL Final   • Hematocrit 01/08/2023 40.8  34.0 - 46.6 % Final   • MCV 01/08/2023 87.9  79.0 - 97.0 fL Final   • MCH 01/08/2023 28.4  26.6 - 33.0 pg Final   • MCHC 01/08/2023 32.4  31.5 - 35.7 g/dL Final   • RDW 01/08/2023 13.3  12.3 - 15.4 % Final   • RDW-SD 01/08/2023 42.5  37.0 - 54.0 fl Final   • MPV 01/08/2023 9.2  6.0 - 12.0 fL Final   • Platelets 01/08/2023 401  140 - 450 10*3/mm3 Final   • Neutrophil % 01/08/2023 64.4  42.7 - 76.0 % Final   • Lymphocyte % 01/08/2023 24.3  19.6 - 45.3 % Final   • Monocyte % 01/08/2023 6.9  5.0 - 12.0 % Final   • Eosinophil % 01/08/2023 3.5  0.3 - 6.2 % Final   • Basophil % 01/08/2023 0.5  0.0 - 1.5 % Final   • Immature Grans % 01/08/2023 0.4  0.0 - 0.5 % Final   • Neutrophils, Absolute 01/08/2023 5.97  1.70 - 7.00 10*3/mm3 Final   • Lymphocytes, Absolute 01/08/2023 2.25  0.70 - 3.10 10*3/mm3 Final   • Monocytes, Absolute 01/08/2023 0.64  0.10 - 0.90 10*3/mm3 Final   • Eosinophils, Absolute 01/08/2023 0.32  0.00 - 0.40 10*3/mm3 Final   • Basophils, Absolute 01/08/2023 0.05  0.00 - 0.20 10*3/mm3 Final   • Immature Grans, Absolute 01/08/2023 0.04  0.00 - 0.05 10*3/mm3 Final   • nRBC 01/08/2023 0.0  0.0 - 0.2 /100 WBC Final   • Glucose 01/08/2023 125  70 - 130 mg/dL Final    RN NotifiedOperator: [id number]  KLARISSA CHIQUITABLANCAMeter ID: [id number]   • Glucose 01/08/2023 168 (H)  70 - 130 mg/dL Final    RN NotifiedOperator: [id number] CARLIE SEOHonorHealth Rehabilitation HospitalMeter ID: [id number]   • Color, UA 01/08/2023 Yellow  Yellow, Straw, Dark Yellow, Kristel Final   • Appearance, UA 01/08/2023 Clear  Clear Final   • pH, UA 01/08/2023 6.0  5.0 - 9.0 Final   • Specific Gravity, UA 01/08/2023 1.024  1.003 - 1.030 Final   • Glucose, UA 01/08/2023 Negative  Negative Final   • Ketones, UA 01/08/2023 Negative  Negative Final   • Bilirubin, UA 01/08/2023 Negative  Negative Final   • Blood, UA 01/08/2023 Negative  Negative Final   • Protein, UA 01/08/2023 Negative  Negative Final   • Leuk Esterase, UA 01/08/2023 Negative  Negative Final   • Nitrite, UA 01/08/2023 Negative  Negative Final   • Urobilinogen, UA 01/08/2023 1.0 E.U./dL  0.2 - 1.0 E.U./dL Final   • Glucose 01/08/2023 142 (H)  70 - 130 mg/dL Final    RN NotifiedOperator: [id number] CARLIE SEOHonorHealth Rehabilitation HospitalMeter ID: [id number]   • Glucose 01/08/2023 112  70 - 130 mg/dL Final    RN NotifiedOperator: [id number] BERRIOS GABBIHonorHealth Rehabilitation HospitalMeter ID: [id number]   • Glucose 01/09/2023 106 (H)  65 - 99 mg/dL Final   • BUN 01/09/2023 10  8 - 23 mg/dL Final   • Creatinine 01/09/2023 0.99  0.57 - 1.00 mg/dL Final   • Sodium 01/09/2023 136  136 - 145 mmol/L Final   • Potassium 01/09/2023 3.9  3.5 - 5.2 mmol/L Final    Slight hemolysis detected by analyzer. Results may be affected.   • Chloride 01/09/2023 101  98 - 107 mmol/L Final   • CO2 01/09/2023 29.0  22.0 - 29.0 mmol/L Final   • Calcium 01/09/2023 9.1  8.6 - 10.5 mg/dL Final   • BUN/Creatinine Ratio 01/09/2023 10.1  7.0 - 25.0 Final   • Anion Gap 01/09/2023 6.0  5.0 - 15.0 mmol/L Final   • eGFR 01/09/2023 65.0  >60.0 mL/min/1.73 Final    National Kidney Foundation and American Society of Nephrology (ASN) Task Force recommended calculation based on the Chronic Kidney Disease Epidemiology Collaboration (CKD-EPI) equation refit without adjustment for race.   • Magnesium  01/09/2023 2.1  1.6 - 2.4 mg/dL Final   • WBC 01/09/2023 7.24  3.40 - 10.80 10*3/mm3 Final   • RBC 01/09/2023 4.70  3.77 - 5.28 10*6/mm3 Final   • Hemoglobin 01/09/2023 13.5  12.0 - 15.9 g/dL Final   • Hematocrit 01/09/2023 41.1  34.0 - 46.6 % Final   • MCV 01/09/2023 87.4  79.0 - 97.0 fL Final   • MCH 01/09/2023 28.7  26.6 - 33.0 pg Final   • MCHC 01/09/2023 32.8  31.5 - 35.7 g/dL Final   • RDW 01/09/2023 13.4  12.3 - 15.4 % Final   • RDW-SD 01/09/2023 42.3  37.0 - 54.0 fl Final   • MPV 01/09/2023 10.4  6.0 - 12.0 fL Final   • Platelets 01/09/2023 279  140 - 450 10*3/mm3 Final   • Neutrophil % 01/09/2023 58.9  42.7 - 76.0 % Final   • Lymphocyte % 01/09/2023 28.6  19.6 - 45.3 % Final   • Monocyte % 01/09/2023 7.9  5.0 - 12.0 % Final   • Eosinophil % 01/09/2023 3.3  0.3 - 6.2 % Final   • Basophil % 01/09/2023 0.6  0.0 - 1.5 % Final   • Immature Grans % 01/09/2023 0.7 (H)  0.0 - 0.5 % Final   • Neutrophils, Absolute 01/09/2023 4.27  1.70 - 7.00 10*3/mm3 Final   • Lymphocytes, Absolute 01/09/2023 2.07  0.70 - 3.10 10*3/mm3 Final   • Monocytes, Absolute 01/09/2023 0.57  0.10 - 0.90 10*3/mm3 Final   • Eosinophils, Absolute 01/09/2023 0.24  0.00 - 0.40 10*3/mm3 Final   • Basophils, Absolute 01/09/2023 0.04  0.00 - 0.20 10*3/mm3 Final   • Immature Grans, Absolute 01/09/2023 0.05  0.00 - 0.05 10*3/mm3 Final   • nRBC 01/09/2023 0.0  0.0 - 0.2 /100 WBC Final   • Glucose 01/09/2023 106  70 - 130 mg/dL Final    RN NotifiedOperator: [id number] AGUSTINA TELLEZMetpastora ID: [id number]   • RBC Morphology 01/09/2023 Normal  Normal Final   • WBC Morphology 01/09/2023 Normal  Normal Final   • Platelet Estimate 01/09/2023 Adequate  Normal Final   • Glucose 01/09/2023 117  70 - 130 mg/dL Final    RN NotifiedOperator: [id number] CARLIE TELLEZMetpastora ID: [id number]   • Glucose 01/09/2023 103  70 - 130 mg/dL Final    RN NotifiedOperator: [id number] CARLIE TELLEZMetpastora ID: [id number]   • Glucose 01/09/2023 96  70 - 130 mg/dL Final    RN  "NotifiedOperator: [id number] BRI GARCIAMeter ID: [id number]   • Glucose 01/10/2023 118  70 - 130 mg/dL Final    RN NotifiedOperator: [id number] BRI GARCIAMeter ID: [id number]   • Glucose 01/10/2023 146 (H)  70 - 130 mg/dL Final    RN NotifiedOperator: [id number] CARLIE TELLEZMeter ID: [id number]   • Reference Lab Report 01/10/2023    Final                    Value:[facility]  [address] [place] [address]  Phone: [phone] or [phone]  Fax: [phone]  Landen Baker M.D., Medical Director    PATIENT NAME                           LABORATORY NO.  [address]  CHIQUITA BAILEY.                    [id number]  [id number]                         AGE              SEX  SSN           CLIENT REF #  [facility]           63      1960  F    [id number]   [id number]    [place]                       REQUESTING M.D.     ATTENDING M.D.     COPY TO.  [address] [place] [address]                 DARRIAN             RODO JEAN       ALEXYS JHON  [address]  DATE COLLECTED      DATE RECEIVED      DATE REPORTED  01/10/2023          2023         2023    DIAGNOSIS:  A.   GASTRIC ANTRUM, BIOPSY:  Reactive gastropathy  B.   GE JUNCTION:  Reactive gastric and squamous mucosa  Negative for specialized Chapman's mucosa    JBS/pah    CLINICAL HISTORY:  Chest pain,                           unspecified type, iron deficiency anemia due to chronic blood loss    SPECIMENS RECEIVED:  A.  GASTRIC ANTRUM, BIOPSY  B.  GE JUNCTION    MICROSCOPIC DESCRIPTION:  Tissue blocks are prepared and slides are examined microscopically on all  specimens. See diagnosis for details.    Professional interpretation rendered by Rigoberto Al M.D. at [facility],  [place], [address] [place] [address].    GROSS DESCRIPTION:  A.  Specimen is received in 1 formalin filled container labeled \"gastric antrum\"  and consists of 2 portions of " "tan soft tissue measuring 0.8 x 0.4 x 0.1 cm.  The specimen is submitted entirely in 1 cassette.  BW  B.  Specimen is received in 1 formalin filled container labeled \"EG J\" and  consists of a single portion of tan soft tissue measuring 0.3 x 0.3 x 0.3 cm.  The specimen is submitted entirely in 1 cassette.    REVIEWED, DIAGNOSED AND ELECTRONICALLY  SIGNED BY:    Rigoberto Al M.D.  CPT CODES:  88305x2     • Glucose 01/10/2023 187 (H)  70 - 130 mg/dL Final    RN NotifiedOperator: [id number] BRI MADISONMeter ID: [id number]   • Glucose 01/11/2023 121  70 - 130 mg/dL Final    RN NotifiedOperator: [id number] VouchedFor MADISONMeter ID: [id number]   • Glucose 01/11/2023 205 (H)  70 - 130 mg/dL Final    RN NotifiedOperator: [id number] SHAISTA TIFFANYMeter ID: [id number]     Assessment & Plan      1. Right upper quadrant abdominal pain    1.  Epigastric pain with nausea, likely due to gastroparesis and need to rule out biliary pathology.  Continue PPI and Reglan.  Add Pepcid nightly.  Obtain right upper quadrant sonogram.  2.  Nocturnal cough, likely due to GERD.  Continue PPI.  Add  Pepcid nightly.  3.  Chest pain, likely due to GERD and dysmotility.  Continue PPI and add Pepcid.  Trial of Bentyl if continues.  4.  Obesity, recommend exercise and diet control.       Orders placed during this encounter include:  Orders Placed This Encounter   Procedures   • US Liver     Standing Status:   Future     Number of Occurrences:   1     Standing Expiration Date:   1/17/2024     Order Specific Question:   Reason for Exam:     Answer:   RUQ pain       * Surgery not found *    Review and/or summary of lab tests, radiology, procedures, medications. Review and summary of old records and obtaining of history. The risks and benefits of my recommendations, as well as other treatment options were discussed with the patient today. Questions were answered.    New Medications Ordered This Visit   Medications   • famotidine (Pepcid) " 40 MG tablet     Sig: Take 1 tablet by mouth Daily for 30 days.     Dispense:  30 tablet     Refill:  6       Follow-up: Return in about 1 month (around 2023).               Results for orders placed or performed during the hospital encounter of 23   Gold Top - SST   Result Value Ref Range    Extra Tube Hold for add-ons.    Green Top (Gel)   Result Value Ref Range    Extra Tube Hold for add-ons.    Scan Slide    Specimen: Blood   Result Value Ref Range    RBC Morphology Normal Normal    WBC Morphology Normal Normal    Platelet Estimate Adequate Normal   TISSUE EXAM, [facility] (JORGE LUIS,[facility])    Specimen: A: Gastric, Antrum; Tissue    B: Esophagus; Tissue   Result Value Ref Range    Reference Lab Report       [facility]  [address] [place] [address]  Phone: [phone] or [phone]  Fax: [phone]  Landen Baker M.D., Medical Director    PATIENT NAME                           LABORATORY NO.  [address]  CHIQUITA BAILEY.                    [id number]  [id number]                         AGE              SEX  SSN           CLIENT REF #  [facility]           63      1960  F    [id number]   [id number]    [place]                       REQUESTING M.D.     ATTENDING M.D.     COPY TO.  [address] [place] [address]                 RODO COLMENARES DAVID REED, JHON  [address]  DATE COLLECTED      DATE RECEIVED      DATE REPORTED  01/10/2023          2023         2023    DIAGNOSIS:  A.   GASTRIC ANTRUM, BIOPSY:  Reactive gastropathy  B.   GE JUNCTION:  Reactive gastric and squamous mucosa  Negative for specialized Chapman's mucosa    JBS/pah    CLINICAL HISTORY:  Chest pain,  unspecified type, iron deficiency anemia due to chronic blood loss    SPECIMENS RECEIVED:  A.  GASTRIC ANTRUM, BIOPSY  B.  GE JUNCTION    MICROSCOPIC DESCRIPTION:  Tissue blocks are prepared and slides are examined  "microscopically on all  specimens. See diagnosis for details.    Professional interpretation rendered by Rigoberto Al M.D. at [email or web address],  [email or web address], [address] [place] [address].    GROSS DESCRIPTION:  A.  Specimen is received in 1 formalin filled container labeled \"gastric antrum\"  and consists of 2 portions of tan soft tissue measuring 0.8 x 0.4 x 0.1 cm.  The specimen is submitted entirely in 1 cassette.  BW  B.  Specimen is received in 1 formalin filled container labeled \"EG J\" and  consists of a single portion of tan soft tissue measuring 0.3 x 0.3 x 0.3 cm.  The specimen is submitted entirely in 1 cassette.    REVIEWED, DIAGNOSED AND ELECTRONICALLY  SIGNED BY:    Rigoberto Al M.D.  CPT CODES:  88305x2     Urinalysis With Culture If Indicated - Urine, Clean Catch    Specimen: Urine, Clean Catch   Result Value Ref Range    Color, UA Yellow Yellow, Straw, Dark Yellow, Kristel    Appearance, UA Clear Clear    pH, UA 6.0 5.0 - 9.0    Specific Gravity, UA 1.024 1.003 - 1.030    Glucose, UA Negative Negative    Ketones, UA Negative Negative    Bilirubin, UA Negative Negative    Blood, UA Negative Negative    Protein, UA Negative Negative    Leuk Esterase, UA Negative Negative    Nitrite, UA Negative Negative    Urobilinogen, UA 1.0 E.U./dL 0.2 - 1.0 E.U./dL   CBC Auto Differential    Specimen: Blood   Result Value Ref Range    WBC 7.24 3.40 - 10.80 10*3/mm3    RBC 4.70 3.77 - 5.28 10*6/mm3    Hemoglobin 13.5 12.0 - 15.9 g/dL    Hematocrit 41.1 34.0 - 46.6 %    MCV 87.4 79.0 - 97.0 fL    MCH 28.7 26.6 - 33.0 pg    MCHC 32.8 31.5 - 35.7 g/dL    RDW 13.4 12.3 - 15.4 %    RDW-SD 42.3 37.0 - 54.0 fl    MPV 10.4 6.0 - 12.0 fL    Platelets 279 140 - 450 10*3/mm3    Neutrophil % 58.9 42.7 - 76.0 %    Lymphocyte % 28.6 19.6 - 45.3 %    Monocyte % 7.9 5.0 - 12.0 %    Eosinophil % 3.3 0.3 - 6.2 %    Basophil % 0.6 0.0 - 1.5 %    Immature Grans % 0.7 (H) 0.0 - 0.5 %    Neutrophils, Absolute 4.27 1.70 - 7.00 " 10*3/mm3    Lymphocytes, Absolute 2.07 0.70 - 3.10 10*3/mm3    Monocytes, Absolute 0.57 0.10 - 0.90 10*3/mm3    Eosinophils, Absolute 0.24 0.00 - 0.40 10*3/mm3    Basophils, Absolute 0.04 0.00 - 0.20 10*3/mm3    Immature Grans, Absolute 0.05 0.00 - 0.05 10*3/mm3    nRBC 0.0 0.0 - 0.2 /100 WBC   CBC Auto Differential    Specimen: Blood   Result Value Ref Range    WBC 9.27 3.40 - 10.80 10*3/mm3    RBC 4.64 3.77 - 5.28 10*6/mm3    Hemoglobin 13.2 12.0 - 15.9 g/dL    Hematocrit 40.8 34.0 - 46.6 %    MCV 87.9 79.0 - 97.0 fL    MCH 28.4 26.6 - 33.0 pg    MCHC 32.4 31.5 - 35.7 g/dL    RDW 13.3 12.3 - 15.4 %    RDW-SD 42.5 37.0 - 54.0 fl    MPV 9.2 6.0 - 12.0 fL    Platelets 401 140 - 450 10*3/mm3    Neutrophil % 64.4 42.7 - 76.0 %    Lymphocyte % 24.3 19.6 - 45.3 %    Monocyte % 6.9 5.0 - 12.0 %    Eosinophil % 3.5 0.3 - 6.2 %    Basophil % 0.5 0.0 - 1.5 %    Immature Grans % 0.4 0.0 - 0.5 %    Neutrophils, Absolute 5.97 1.70 - 7.00 10*3/mm3    Lymphocytes, Absolute 2.25 0.70 - 3.10 10*3/mm3    Monocytes, Absolute 0.64 0.10 - 0.90 10*3/mm3    Eosinophils, Absolute 0.32 0.00 - 0.40 10*3/mm3    Basophils, Absolute 0.05 0.00 - 0.20 10*3/mm3    Immature Grans, Absolute 0.04 0.00 - 0.05 10*3/mm3    nRBC 0.0 0.0 - 0.2 /100 WBC   CBC Auto Differential    Specimen: Blood   Result Value Ref Range    WBC 9.28 3.40 - 10.80 10*3/mm3    RBC 4.26 3.77 - 5.28 10*6/mm3    Hemoglobin 12.2 12.0 - 15.9 g/dL    Hematocrit 37.5 34.0 - 46.6 %    MCV 88.0 79.0 - 97.0 fL    MCH 28.6 26.6 - 33.0 pg    MCHC 32.5 31.5 - 35.7 g/dL    RDW 13.3 12.3 - 15.4 %    RDW-SD 42.8 37.0 - 54.0 fl    MPV 9.1 6.0 - 12.0 fL    Platelets 373 140 - 450 10*3/mm3    Neutrophil % 66.7 42.7 - 76.0 %    Lymphocyte % 22.6 19.6 - 45.3 %    Monocyte % 6.3 5.0 - 12.0 %    Eosinophil % 3.7 0.3 - 6.2 %    Basophil % 0.4 0.0 - 1.5 %    Immature Grans % 0.3 0.0 - 0.5 %    Neutrophils, Absolute 6.19 1.70 - 7.00 10*3/mm3    Lymphocytes, Absolute 2.10 0.70 - 3.10 10*3/mm3     Monocytes, Absolute 0.58 0.10 - 0.90 10*3/mm3    Eosinophils, Absolute 0.34 0.00 - 0.40 10*3/mm3    Basophils, Absolute 0.04 0.00 - 0.20 10*3/mm3    Immature Grans, Absolute 0.03 0.00 - 0.05 10*3/mm3    nRBC 0.0 0.0 - 0.2 /100 WBC   CBC Auto Differential    Specimen: Blood   Result Value Ref Range    WBC 12.39 (H) 3.40 - 10.80 10*3/mm3    RBC 4.50 3.77 - 5.28 10*6/mm3    Hemoglobin 12.9 12.0 - 15.9 g/dL    Hematocrit 39.4 34.0 - 46.6 %    MCV 87.6 79.0 - 97.0 fL    MCH 28.7 26.6 - 33.0 pg    MCHC 32.7 31.5 - 35.7 g/dL    RDW 13.2 12.3 - 15.4 %    RDW-SD 41.6 37.0 - 54.0 fl    MPV 9.1 6.0 - 12.0 fL    Platelets 421 140 - 450 10*3/mm3    Neutrophil % 71.2 42.7 - 76.0 %    Lymphocyte % 17.8 (L) 19.6 - 45.3 %    Monocyte % 7.4 5.0 - 12.0 %    Eosinophil % 2.9 0.3 - 6.2 %    Basophil % 0.4 0.0 - 1.5 %    Immature Grans % 0.3 0.0 - 0.5 %    Neutrophils, Absolute 8.82 (H) 1.70 - 7.00 10*3/mm3    Lymphocytes, Absolute 2.20 0.70 - 3.10 10*3/mm3    Monocytes, Absolute 0.92 (H) 0.10 - 0.90 10*3/mm3    Eosinophils, Absolute 0.36 0.00 - 0.40 10*3/mm3    Basophils, Absolute 0.05 0.00 - 0.20 10*3/mm3    Immature Grans, Absolute 0.04 0.00 - 0.05 10*3/mm3    nRBC 0.0 0.0 - 0.2 /100 WBC     *Note: Due to a large number of results and/or encounters for the requested time period, some results have not been displayed. A complete set of results can be found in Results Review.         This document has been electronically signed by Jeremiah Wilkins MD on February 3, 2023 23:38 CST

## 2023-02-07 ENCOUNTER — OFFICE VISIT (OUTPATIENT)
Dept: GASTROENTEROLOGY | Facility: CLINIC | Age: 63
End: 2023-02-07
Payer: COMMERCIAL

## 2023-02-07 ENCOUNTER — READMISSION MANAGEMENT (OUTPATIENT)
Dept: CALL CENTER | Facility: HOSPITAL | Age: 63
End: 2023-02-07
Payer: COMMERCIAL

## 2023-02-07 ENCOUNTER — LAB (OUTPATIENT)
Dept: LAB | Facility: HOSPITAL | Age: 63
End: 2023-02-07
Payer: COMMERCIAL

## 2023-02-07 VITALS
HEART RATE: 67 BPM | HEIGHT: 68 IN | SYSTOLIC BLOOD PRESSURE: 130 MMHG | BODY MASS INDEX: 40.75 KG/M2 | DIASTOLIC BLOOD PRESSURE: 68 MMHG

## 2023-02-07 DIAGNOSIS — R10.11 RIGHT UPPER QUADRANT ABDOMINAL PAIN: Primary | ICD-10-CM

## 2023-02-07 LAB
ALBUMIN SERPL-MCNC: 4.1 G/DL (ref 3.5–5.2)
ALP SERPL-CCNC: 111 U/L (ref 39–117)
ALT SERPL W P-5'-P-CCNC: 20 U/L (ref 1–33)
AST SERPL-CCNC: 21 U/L (ref 1–32)
BILIRUB CONJ SERPL-MCNC: <0.2 MG/DL (ref 0–0.3)
BILIRUB INDIRECT SERPL-MCNC: NORMAL MG/DL
BILIRUB SERPL-MCNC: 0.3 MG/DL (ref 0–1.2)
PROT SERPL-MCNC: 6.8 G/DL (ref 6–8.5)

## 2023-02-07 PROCEDURE — 99214 OFFICE O/P EST MOD 30 MIN: CPT | Performed by: INTERNAL MEDICINE

## 2023-02-07 PROCEDURE — 36415 COLL VENOUS BLD VENIPUNCTURE: CPT | Performed by: INTERNAL MEDICINE

## 2023-02-07 PROCEDURE — 80076 HEPATIC FUNCTION PANEL: CPT | Performed by: INTERNAL MEDICINE

## 2023-02-07 RX ORDER — DICYCLOMINE HCL 20 MG
20 TABLET ORAL EVERY 6 HOURS
Qty: 120 TABLET | Refills: 5 | Status: SHIPPED | OUTPATIENT
Start: 2023-02-07 | End: 2023-03-09

## 2023-02-07 NOTE — OUTREACH NOTE
Medical Week 4 Survey    Flowsheet Row Responses   [facility] patient discharged from? [place]   Does the patient have one of the following disease processes/diagnoses(primary or secondary)? Other   Week 4 attempt successful? Yes   Call start time 1455   Call end time 1458   Discharge diagnosis Chest pain, unspecified type  Gastroparesis   Meds reviewed with patient/caregiver? Yes   Is the patient having any side effects they believe may be caused by any medication additions or changes? No   Is the patient taking all medications as directed (includes completed medication regime)? Yes   Has the patient kept scheduled appointments due by today? Yes   Is the patient still receiving Home Health Services? N/A   Psychosocial issues? No   What is the patient's perception of their health status since discharge? Improving   Is the patient/caregiver able to teach back signs and symptoms related to disease process for when to call PCP? Yes   Is the patient/caregiver able to teach back signs and symptoms related to disease process for when to call 911? Yes   Is the patient/caregiver able to teach back the hierarchy of who to call/visit for symptoms/problems? PCP, Specialist, Home health nurse, Urgent Care, ED, 911 Yes   If the patient is a current smoker, are they able to teach back resources for cessation? Not a smoker   Week 4 Call Completed? Yes   Would the patient like one additional call? No   Graduated Yes   Is the patient interested in additional calls from an ambulatory ?  NOTE:  applies to high risk patients requiring additional follow-up. No   Did the patient feel the follow up calls were helpful during their recovery period? Yes   Was the number of calls appropriate? Yes   Does the patient have an Advance Directive or Living Will? Yes          Jacqueline MADISON - Registered Nurse

## 2023-02-08 DIAGNOSIS — G89.29 CHRONIC RIGHT-SIDED LOW BACK PAIN WITH RIGHT-SIDED SCIATICA: ICD-10-CM

## 2023-02-08 DIAGNOSIS — M54.41 CHRONIC RIGHT-SIDED LOW BACK PAIN WITH RIGHT-SIDED SCIATICA: ICD-10-CM

## 2023-02-08 DIAGNOSIS — G54.6 PHANTOM PAIN AFTER AMPUTATION OF LOWER EXTREMITY: Chronic | ICD-10-CM

## 2023-02-09 DIAGNOSIS — R60.0 BILATERAL LEG EDEMA: Primary | ICD-10-CM

## 2023-02-10 RX ORDER — HYDROCODONE BITARTRATE AND ACETAMINOPHEN 7.5; 325 MG/1; MG/1
1 TABLET ORAL EVERY 6 HOURS PRN
Qty: 120 TABLET | Refills: 0 | Status: SHIPPED | OUTPATIENT
Start: 2023-02-10 | End: 2023-03-06 | Stop reason: SDUPTHER

## 2023-02-10 RX ORDER — FUROSEMIDE 40 MG/1
TABLET ORAL
Qty: 30 TABLET | Refills: 5 | Status: SHIPPED | OUTPATIENT
Start: 2023-02-10

## 2023-02-12 DIAGNOSIS — G47.09 OTHER INSOMNIA: ICD-10-CM

## 2023-02-14 ENCOUNTER — DOCUMENTATION (OUTPATIENT)
Dept: FAMILY MEDICINE CLINIC | Facility: CLINIC | Age: 63
End: 2023-02-14
Payer: COMMERCIAL

## 2023-02-14 DIAGNOSIS — K21.9 GERD WITHOUT ESOPHAGITIS: ICD-10-CM

## 2023-02-14 RX ORDER — LANOLIN ALCOHOL/MO/W.PET/CERES
6 CREAM (GRAM) TOPICAL NIGHTLY
Qty: 180 TABLET | OUTPATIENT
Start: 2023-02-14

## 2023-02-14 RX ORDER — PANTOPRAZOLE SODIUM 20 MG/1
40 TABLET, DELAYED RELEASE ORAL DAILY
Qty: 180 TABLET | Refills: 1 | OUTPATIENT
Start: 2023-02-14

## 2023-02-14 NOTE — PROGRESS NOTES
Patient needs an annual appointment with fasting labs. I advised Pipo'bryan MA to call and schedule.

## 2023-02-15 ENCOUNTER — OFFICE VISIT (OUTPATIENT)
Dept: ORTHOPEDIC SURGERY | Facility: CLINIC | Age: 63
End: 2023-02-15
Payer: COMMERCIAL

## 2023-02-15 VITALS — BODY MASS INDEX: 40.62 KG/M2 | HEIGHT: 68 IN | WEIGHT: 268 LBS

## 2023-02-15 DIAGNOSIS — M70.22 OLECRANON BURSITIS, LEFT ELBOW: ICD-10-CM

## 2023-02-15 DIAGNOSIS — G62.9 NEUROPATHY: Chronic | ICD-10-CM

## 2023-02-15 DIAGNOSIS — M25.522 LEFT ELBOW PAIN: Primary | ICD-10-CM

## 2023-02-15 DIAGNOSIS — E11.43 TYPE 2 DIABETES MELLITUS WITH DIABETIC AUTONOMIC NEUROPATHY, WITH LONG-TERM CURRENT USE OF INSULIN: Chronic | ICD-10-CM

## 2023-02-15 DIAGNOSIS — Z79.4 TYPE 2 DIABETES MELLITUS WITH DIABETIC AUTONOMIC NEUROPATHY, WITH LONG-TERM CURRENT USE OF INSULIN: Chronic | ICD-10-CM

## 2023-02-15 PROCEDURE — 99213 OFFICE O/P EST LOW 20 MIN: CPT | Performed by: NURSE PRACTITIONER

## 2023-02-15 PROCEDURE — 20605 DRAIN/INJ JOINT/BURSA W/O US: CPT | Performed by: NURSE PRACTITIONER

## 2023-02-15 RX ORDER — LIDOCAINE HYDROCHLORIDE 10 MG/ML
2 INJECTION, SOLUTION INFILTRATION; PERINEURAL
Status: COMPLETED | OUTPATIENT
Start: 2023-02-15 | End: 2023-02-15

## 2023-02-15 RX ORDER — GABAPENTIN 100 MG/1
CAPSULE ORAL
Qty: 60 CAPSULE | Refills: 0 | Status: SHIPPED | OUTPATIENT
Start: 2023-02-15 | End: 2023-03-17

## 2023-02-15 RX ADMIN — LIDOCAINE HYDROCHLORIDE 2 ML: 10 INJECTION, SOLUTION INFILTRATION; PERINEURAL at 08:30

## 2023-02-15 NOTE — PROGRESS NOTES
"Ariana Martinez is a 63 y.o. female returns for     Chief Complaint   Patient presents with   • Left Elbow - Follow-up, Pain       HISTORY OF PRESENT ILLNESS:    Mrs. Martinez is a 63-year-old female who presents today to follow-up on olecranon bursitis of left elbow that has now been present for around 3 months.  She has been treated with therapeutic aspiration with steroid injection followed by therapeutic aspiration with oral prednisone.  She has also been treated with RICE therapy and instructed on avoidance of pressure on bony prominences.  She reports after last aspiration and prednisone symptoms seem to get better, however now symptoms have returned.  No fever, nausea, vomiting.  No burning, tingling, numbness.  No injuries or trauma since last being seen.       CONCURRENT MEDICAL HISTORY:    The following portions of the patient's history were reviewed and updated as appropriate: allergies, current medications, past family history, past medical history, past social history, past surgical history and problem list.     ROS  No fevers or chills.  No chest pain or shortness of air.  No GI or  disturbances.    PHYSICAL EXAMINATION:       Ht 172.7 cm (68\")   Wt 122 kg (268 lb)   BMI 40.75 kg/m²     Physical Exam  Vitals and nursing note reviewed.   Constitutional:       General: She is not in acute distress.     Appearance: She is well-developed. She is not toxic-appearing or diaphoretic.   HENT:      Head: Normocephalic.   Eyes:      General: No scleral icterus.  Pulmonary:      Effort: Pulmonary effort is normal. No respiratory distress.   Skin:     General: Skin is warm and dry.   Neurological:      Mental Status: She is alert and oriented to person, place, and time.   Psychiatric:         Behavior: Behavior normal.         Thought Content: Thought content normal.         Judgment: Judgment normal.         GAIT:     []  Normal  []  Antalgic    Assistive device: []  None  []  Walker     []  Crutches  []  " Cane     []  Wheelchair  []  Stretcher    Ortho Exam      Left Elbow Exam      Tenderness   Left elbow tenderness location: olecranon bursa       Range of Motion   The patient has normal left elbow ROM.     Other   Erythema: absent  Sensation: normal  Pulse: present     Comments:      No evidence of infection.  Mild olecranon bursitis.  Neurovascular intact.  No change in exam.              ASSESSMENT:    Diagnoses and all orders for this visit:    Left elbow pain    Olecranon bursitis, left elbow    Other orders  -     Medium Joint Arthrocentesis: L olecranon bursa          PLAN    Medium Joint Arthrocentesis: L olecranon bursa  Date/Time: 2/15/2023 8:30 AM  Consent given by: patient  Site marked: site marked  Timeout: Immediately prior to procedure a time out was called to verify the correct patient, procedure, equipment, support staff and site/side marked as required   Supporting Documentation  Indications: pain   Procedure Details  Location: elbow - L olecranon bursa  Preparation: Patient was prepped and draped in the usual sterile fashion  Needle size: 22 G  Approach: anterolateral  Medications administered: 2 mL lidocaine 1 %  Aspirate amount: 3 mL  Aspirate: yellow  Patient tolerance: patient tolerated the procedure well with no immediate complications            Recommend proceeding with formal physical therapy.  Patient declines this offer.  After discussing available treatment options including risk, benefits, alternatives, patient desires to proceed with therapeutic aspiration.  Patient given HEP.  Explained to patient that if third aspiration attempt does not provide her lasting relief then I will recommend she proceed with physical therapy and if this is ineffective and she continues to have symptoms that are worrisome to her then she should return to see orthopedic surgeon.  Patient verbalized understanding.    Return if symptoms worsen or fail to improve.    EMR Dragon/Transciption Disclaimer: Some of  this note may be an electronic transcription/translation of spoken language to printed text.  The electronic translation of spoken language may permit erroneous, or at times, nonsensical words or phrases to be inadvertently transcribed. Although I have reviewed the note for such errors, some may still exist.       This document has been electronically signed by Meera SPENCE on February 15, 2023 08:53 CST

## 2023-02-18 DIAGNOSIS — K21.9 GERD WITHOUT ESOPHAGITIS: ICD-10-CM

## 2023-02-19 NOTE — PROGRESS NOTES
Chief Complaint   Patient presents with   • f/u after liver US       Subjective    Ariana Martinez is a 63 y.o. female.    History of Present Illness  Patient presented to GI clinic for follow-up visit today.  Has intermittent symptoms of right-sided abdominal pain and nausea.  Denied vomiting, diarrhea, constipation, rectal bleeding or weight loss.  Underwent cholecystectomy in the past.  Right upper quadrant sonogram was unremarkable.  Alk phos was 120 and LFTs are normal otherwise.       The following portions of the patient's history were reviewed and updated as appropriate:   Past Medical History:   Diagnosis Date   • Acute bacterial endocarditis 3/13/2021   • Angina, class IV ([facility])    • Anxiety    • Anxiety and depression    • Arthritis    • Benign paroxysmal positional vertigo    • Bladder disorder     has bladder stimulator   • Carpal tunnel syndrome    • CHF (congestive heart failure) ([facility])    • Chronic pain    • Coronary atherosclerosis     hx CABG 2005.  is followed by Dr Kwon   • Depression    • Diabetes mellitus ([facility])     Type 2, controlled   • Diabetic polyneuropathy ([facility])    • Disease of thyroid gland    • Elevated cholesterol    • Female stress incontinence    • Foot pain, left    • Full dentures    • Gastroparesis    • GERD (gastroesophageal reflux disease)    • Hyperlipidemia    • Hypertension    • Low back pain    • Malaise and fatigue    • Multiple joint pain    • Obesity     Refuses to be weighed   • Occlusion and stenosis of bilateral carotid arteries 6/18/2021   • Otalgia     Both   • Perforation of tympanic membrane     Left   • Postoperative wound infection    • Vitamin D deficiency    • Wears glasses     reading     Past Surgical History:   Procedure Laterality Date   • ABDOMINAL SURGERY     • AMPUTATION FOOT     • ANGIOPLASTY      coronary   • ANKLE ARTHROSCOPY Left 02/26/2021    Procedure: Left foot hardware removal, ankle arthroscopy, bone grafting , left foot exostectomy;   Surgeon: Ignacio Lord DPM;  Location: [facility] OR;  Service: Podiatry;  Laterality: Left;   • BREAST BIOPSY Right    • CARDIAC CATHETERIZATION     • CARDIAC CATHETERIZATION N/A 06/20/2017    Procedure: Right Heart Cath;  Surgeon: Can Kwon MD PhD;  Location: [facility] CATH INVASIVE LOCATION;  Service:    • CARDIAC CATHETERIZATION N/A 02/18/2020    Procedure: Left Heart Cath;  Surgeon: Catalina Cooper MD;  Location: [facility] CATH INVASIVE LOCATION;  Service: Cardiology;  Laterality: N/A;   • CARDIAC CATHETERIZATION N/A 04/06/2022    Procedure: Left Heart Cath;  Surgeon: Sheryl Navas MD;  Location: [facility] CATH INVASIVE LOCATION;  Service: Cardiology;  Laterality: N/A;   • CARPAL TUNNEL RELEASE     • CHOLECYSTECTOMY     • COLONOSCOPY N/A 06/24/2020    Procedure: COLONOSCOPY;  Surgeon: Julián Maldonado MD;  Location: [facility] ENDOSCOPY;  Service: Gastroenterology;  Laterality: N/A;   • CORONARY ARTERY BYPASS GRAFT  2005   • ENDOSCOPY N/A 10/19/2018    Procedure: ESOPHAGOGASTRODUODENOSCOPY possible dilation;  Surgeon: Julián Maldonado MD;  Location: [facility] ENDOSCOPY;  Service: Gastroenterology   • ENDOSCOPY N/A 06/24/2020    Procedure: ESOPHAGOGASTRODUODENOSCOPY WED appt please;  Surgeon: Julián Maldonado MD;  Location: [facility] ENDOSCOPY;  Service: Gastroenterology;  Laterality: N/A;   • ENDOSCOPY N/A 06/10/2022    Procedure: ESOPHAGOGASTRODUODENOSCOPY   room 380;  Surgeon: Jeremiah Wilkins MD;  Location: [facility] ENDOSCOPY;  Service: Gastroenterology;  Laterality: N/A;   • ENDOSCOPY N/A 1/10/2023    Procedure: ESOPHAGOGASTRODUODENOSCOPY;  Surgeon: Jeremiah Wilkins MD;  Location: [facility] ENDOSCOPY;  Service: Gastroenterology;  Laterality: N/A;   • ENDOSCOPY AND COLONOSCOPY     • FOOT SURGERY      Toes   • FOOT SURGERY     • GASTRIC BANDING      Revision, laparoscopic   • HYSTERECTOMY     • INCISION AND DRAINAGE LEG Left 03/12/2021    Procedure: Left ankle arthroscopic irrigation and debridement, screw  removal;  Surgeon: Ignacio Lord DPM;  Location: [facility];  Service: Podiatry;  Laterality: Left;   • MOUTH SURGERY     • SALPINGO OOPHORECTOMY     • SHOULDER SURGERY     • SUBTALAR ARTHRODESIS Left 2019    Procedure: LEFT FOOT HARDWARE REMOVAL, FIFTH METATARSAL , OPEN REDUCTION INTERNAL FIXATION, CALCANEAL OSTEOTOMY;  Surgeon: Ignacio Lord DPM;  Location: [facility] OR;  Service: Podiatry   • SUBTALAR ARTHRODESIS Left 10/16/2019    Procedure: foot hardware removal, subtalar joint fusion  possible de/reattachment of achilles tendon        (c-arm);  Surgeon: Ignacio Lord DPM;  Location: [facility] OR;  Service: Podiatry   • SUBTALAR ARTHRODESIS Left 2020    Procedure: subtalar, talonavicular joint arthrodesis.  Removal hardware.          (c-arm);  Surgeon: Ignacio Lord DPM;  Location: [facility];  Service: Podiatry;  Laterality: Left;   • TRANSESOPHAGEAL ECHOCARDIOGRAM (LAMONTE)      With color flow     Family History   Problem Relation Age of Onset   • Diabetes Other    • Heart disease Other    • Hypertension Other    • Heart disease Mother    • Stroke Mother    • Hypertension Mother    • Diabetes Sister    • Heart disease Sister    • Hypertension Sister    • Heart disease Sister    • Diabetes Sister    • Hypertension Sister    • Diabetes Sister    • Diabetes Sister    • Diabetes Sister    • Diabetes Sister      OB History        0    Para   0    Term   0       0    AB   0    Living   0       SAB   0    IAB   0    Ectopic   0    Molar        Multiple   0    Live Births                  Prior to Admission medications    Medication Sig Start Date End Date Taking? Authorizing Provider   amLODIPine (NORVASC) 5 MG tablet Take 1 tablet by mouth Daily. 22  Yes Kimberly Ferguson APRN   ARIPiprazole (ABILIFY) 5 MG tablet TAKE 1 TABLET BY MOUTH EVERY DAY 22  Yes Dolly Foss APRN   aspirin  MG tablet Take 1 tablet by mouth Daily. 22  Yes Mahin Esteves MD    atorvastatin (LIPITOR) 80 MG tablet Take 1 tablet by mouth Daily. 9/7/22  Yes Dolly Foss APRN B-D ULTRAFINE III SHORT PEN 31G X 8 MM misc USE TO INJECT 5 TIMES A DAY 11/11/22  Yes Elvis Gamboa APRN B-D ULTRAFINE III SHORT PEN 31G X 8 MM misc USE UP TO 4 (FOUR) TIMES DAILY WITH INSULIN AS DIRECTED. 12/27/22  Yes Dolly Foss APRN   BD SHARPS CONTAINER HOME misc 1 each Take As Directed. 9/7/18  Yes Francisca Fong MD   Blood Glucose Monitoring Suppl (ONE TOUCH ULTRA MINI) w/Device kit Patient will need the Accu-Check Avitio meter 10/18/21  Yes Kimberly Ferguson APRN   butalbital-acetaminophen-caffeine (FIORICET, ESGIC) -40 MG per tablet Take one to two tablets by mouth per headache episode as needed. 1/4/23  Yes Dolly Fsos APRN   Calcium Citrate-Vitamin D 250-200 MG-UNIT tablet Take 2 tablets by mouth 2 (two) times a day.   Yes Provider, MD Esther   clopidogrel (PLAVIX) 75 MG tablet Take 1 tablet by mouth Daily. 10/3/22  Yes Dolly Foss APRN   [facility] Diclofenac Sodium 1 % gel gel APPLY 4 G TOPICALLY TO THE APPROPRIATE AREA AS DIRECTED 2 (TWO) TIMES A DAY. SMALL AMOUNT TO AFFECTED AREA 1/3/23  Yes Rohan Lazo MD   cyanocobalamin 1000 MCG/ML injection INJECT 1 ML INTO THE APPROPRIATE MUSCLE AS DIRECTED BY PRESCRIBER EVERY 28 (TWENTY-EIGHT) DAYS. 1/17/23  Yes Dolly Foss APRN   fluticasone (FLONASE) 50 MCG/ACT nasal spray INSTILL 2 SPRAYS IN EACH NOSTRIL AS DIRECTED BY PROVIDER DAILY 2/1/23  Yes Dolly Foss APRN   folic acid (FOLVITE) 1 MG tablet TAKE 1 TABLET BY MOUTH EVERY DAY 8/22/22  Yes Teressa Hammond APRN   glucose blood (Accu-Chek Guide) test strip 1 each by Other route 4 (Four) Times a Day. To test blood sugar 4X daily. For  Dx E11.65 5/2/22  Yes Kimberly Ferguson APRN   glucose monitor monitoring kit 1 each Daily. accucheck eve meter, E11.9 6/11/20  Yes Evlis Gamboa APRN   hydroCHLOROthiazide (HYDRODIURIL) 12.5 MG tablet  "TAKE 1 TABLET BY MOUTH EVERY DAY 1/27/23  Yes Dolly Foss APRN   insulin aspart (NovoLOG FlexPen) 100 UNIT/ML solution pen-injector sc pen Inject up to 45 units  3 TIMES A DAY BEFORE MEALS 10/3/22  Yes Elvis Gamboa APRN   Insulin Glargine (BASAGLAR KWIKPEN) 100 UNIT/ML injection pen Inject 40 units into the appropriate area every morning and 35 units into the appropriate area every night 10/24/22  Yes Elvis Gamboa APRN   isosorbide mononitrate (IMDUR) 30 MG 24 hr tablet TAKE 1 TABLET BY MOUTH EVERY DAY 1/13/23  Yes Dolly Foss APRN   Lancets (accu-chek soft touch) lancets As directed 10/18/21  Yes Kimberly Ferguson APRN   levothyroxine (SYNTHROID, LEVOTHROID) 25 MCG tablet Take 1 tablet by mouth Daily. 10/3/22  Yes Elvis Gamboa APRN   meclizine (ANTIVERT) 25 MG tablet Take 1 tablet by mouth 3 (Three) Times a Day As Needed for dizziness. 12/14/17  Yes Evon Hernandez APRN   meloxicam (MOBIC) 15 MG tablet TAKE 1 TABLET BY MOUTH EVERY DAY WITH FOOD 12/7/22  Yes Rohan Lazo MD   methocarbamol (ROBAXIN) 500 MG tablet TAKE 1 TABLET BY MOUTH 2 TIMES A DAY AS NEEDED FOR MUSCLE SPASMS. 6/7/22  Yes Kimberly Ferguson APRN   metoclopramide (REGLAN) 10 MG tablet TAKE 1 TABLET BY MOUTH 4 (FOUR) TIMES A DAY AS NEEDED (NAUSEA). 11/29/22  Yes Dolly Foss APRN   metoclopramide (REGLAN) 5 MG tablet Take 1 tablet by mouth 3 (Three) Times a Day As Needed (vomiting). 12/27/22  Yes Addi Johnson MD   metoprolol succinate XL (TOPROL-XL) 50 MG 24 hr tablet Take 1 tablet by mouth Daily. Dose increased 5/24/21 12/27/22  Yes Dolly Foss APRN   naloxone (NARCAN) 0.4 MG/ML injection Infuse 0.5 mL into a venous catheter Every 5 (Five) Minutes As Needed for Opioid Reversal. 3/13/21  Yes Nick Faye MD   Needle, Disp, (Easy Touch FlipLock Needles) 25G X 1-1/2\" misc 1 each Every 28 (Twenty-Eight) Days. For administering B12 cyanocobalomin. Dx vitamin B12 " "deficiency. 5/24/22  Yes Kimberly Ferguson APRN   Needle, Disp, (Hypodermic Needle) 20G X 1\" misc 1 each Every 28 (Twenty-Eight) Days. For drawing up B12 for injection monthly, change back to smaller gauge needle prior to injection. Dx Vitamin B12  Deficiency. 5/24/22  Yes Kimberly Ferguson APRN   nitroglycerin (NITROSTAT) 0.4 MG SL tablet Place 1 tablet under the tongue Every 5 (Five) Minutes As Needed for Chest Pain. Take no more than 3 doses in 15 minutes. 4/26/22  Yes Kimberly Ferguson APRN   ondansetron (ZOFRAN) 8 MG tablet Take 1 tablet by mouth Every 8 (Eight) Hours As Needed for Nausea or Vomiting. 12/7/22  Yes Dolly Foss APRN   ondansetron ODT (ZOFRAN-ODT) 4 MG disintegrating tablet Place 1 tablet on the tongue 4 (Four) Times a Day As Needed for Nausea or Vomiting. 12/27/22  Yes Addi Johnson MD   pantoprazole (PROTONIX) 20 MG EC tablet Take 2 tablets by mouth Daily. 7/12/22  Yes Kimberly Ferguson APRN   predniSONE (DELTASONE) 5 MG tablet Take twice daily for first 5 days then once daily until gone. 1/17/23  Yes Meera Morales APRN   ranolazine (RANEXA) 500 MG 12 hr tablet Take 1 tablet by mouth Every 12 (Twelve) Hours. 6/29/22  Yes Bill Gibson MD   sucralfate (Carafate) 1 g tablet Take 1 tablet by mouth 4 (Four) Times a Day. 1/11/23  Yes Marleny Montoya PA-C   Syringe 20G X 1-1/2\" 3 ML misc 1 each Every 28 (Twenty-Eight) Days. For injection cyanocobalomin, Dx vit B12 deficiency. 5/24/22  Yes Kimberly Ferguson APRN   venlafaxine XR (EFFEXOR-XR) 75 MG 24 hr capsule TAKE 1 CAPSULE BY MOUTH DAILY WITH BREAKFAST. 12/14/22  Yes Dolly Foss APRN   vitamin D (ERGOCALCIFEROL) 1.25 MG (19831 UT) capsule capsule TAKE 1 CAPSULE BY MOUTH EVERY 7 DAYS. 12/29/22  Yes Dolly Foss APRN   dicyclomine (BENTYL) 20 MG tablet Take 1 tablet by mouth Every 6 (Six) Hours for 30 days. 2/7/23 3/9/23  Jeremiah Wilkins MD   furosemide (LASIX) 40 MG tablet TAKE 1 TABLET BY MOUTH EVERY " DAY 2/10/23   Dolly Foss APRN   gabapentin (NEURONTIN) 100 MG capsule TAKE 1 CAPSULE BY MOUTH EVERY 12 HOURS. 2/15/23   Dolly Foss APRN   HYDROcodone-acetaminophen (NORCO) 7.5-325 MG per tablet Take 1 tablet by mouth Every 6 (Six) Hours As Needed for Moderate Pain. 2/10/23   Dolly Foss APRN     Allergies   Allergen Reactions   • Seroquel [Quetiapine] Anaphylaxis   • Avandia [Rosiglitazone] Swelling   • Morphine And Related Hallucinations   • Oxycodone Hallucinations     Social History     Socioeconomic History   • Marital status:    Tobacco Use   • Smoking status: Never   • Smokeless tobacco: Never   Vaping Use   • Vaping Use: Never used   Substance and Sexual Activity   • Alcohol use: No   • Drug use: No   • Sexual activity: Defer       Review of Systems  Review of Systems   Constitutional: Negative for chills, fatigue, fever and unexpected weight change.   HENT: Negative for congestion, ear discharge, hearing loss, nosebleeds and sore throat.    Eyes: Negative for pain, discharge and redness.   Respiratory: Negative for cough, chest tightness, shortness of breath and wheezing.    Cardiovascular: Negative for chest pain and palpitations.   Gastrointestinal: Positive for abdominal pain and nausea. Negative for abdominal distention, blood in stool, constipation, diarrhea and vomiting.   Endocrine: Negative for cold intolerance, polydipsia, polyphagia and polyuria.   Genitourinary: Negative for dysuria, flank pain, frequency, hematuria and urgency.   Musculoskeletal: Negative for arthralgias, back pain, joint swelling and myalgias.   Skin: Negative for color change, pallor and rash.   Neurological: Negative for tremors, seizures, syncope, weakness and headaches.   Hematological: Negative for adenopathy. Does not bruise/bleed easily.   Psychiatric/Behavioral: Negative for behavioral problems, confusion, dysphoric mood, hallucinations and suicidal ideas. The patient is not nervous/anxious.   "       /68 (BP Location: Right arm)   Pulse 67   Ht 172.7 cm (68\")   BMI 40.75 kg/m²     Objective    Physical Exam  Constitutional:       Appearance: She is well-developed.   HENT:      Head: Normocephalic and atraumatic.   Eyes:      Conjunctiva/sclera: Conjunctivae normal.      Pupils: Pupils are equal, round, and reactive to light.   Neck:      Thyroid: No thyromegaly.   Cardiovascular:      Rate and Rhythm: Normal rate and regular rhythm.      Heart sounds: Normal heart sounds. No murmur heard.  Pulmonary:      Effort: Pulmonary effort is normal.      Breath sounds: Normal breath sounds. No wheezing.   Abdominal:      General: Bowel sounds are normal. There is no distension.      Palpations: Abdomen is soft. There is no mass.      Tenderness: There is no abdominal tenderness.      Hernia: No hernia is present.   Genitourinary:     Comments: No lesions noted  Musculoskeletal:         General: No tenderness. Normal range of motion.      Cervical back: Normal range of motion and neck supple.   Lymphadenopathy:      Cervical: No cervical adenopathy.   Skin:     General: Skin is warm and dry.      Findings: No rash.   Neurological:      Mental Status: She is alert and oriented to person, place, and time.      Cranial Nerves: No cranial nerve deficit.   Psychiatric:         Thought Content: Thought content normal.       Admission on 01/06/2023, Discharged on 01/11/2023   Component Date Value Ref Range Status   • QT Interval 01/06/2023 412  ms Final   • QTC Interval 01/06/2023 428  ms Final   • Troponin T 01/06/2023 <0.010  0.000 - 0.030 ng/mL Final   • Troponin T 01/06/2023 <0.010  0.000 - 0.030 ng/mL Final   • Glucose 01/06/2023 204 (H)  65 - 99 mg/dL Final   • BUN 01/06/2023 13  8 - 23 mg/dL Final   • Creatinine 01/06/2023 1.02 (H)  0.57 - 1.00 mg/dL Final   • Sodium 01/06/2023 139  136 - 145 mmol/L Final   • Potassium 01/06/2023 3.6  3.5 - 5.2 mmol/L Final   • Chloride 01/06/2023 100  98 - 107 mmol/L " Final   • CO2 01/06/2023 30.0 (H)  22.0 - 29.0 mmol/L Final   • Calcium 01/06/2023 9.6  8.6 - 10.5 mg/dL Final   • Total Protein 01/06/2023 7.1  6.0 - 8.5 g/dL Final   • Albumin 01/06/2023 4.1  3.5 - 5.2 g/dL Final   • ALT (SGPT) 01/06/2023 18  1 - 33 U/L Final   • AST (SGOT) 01/06/2023 14  1 - 32 U/L Final   • Alkaline Phosphatase 01/06/2023 120 (H)  39 - 117 U/L Final   • Total Bilirubin 01/06/2023 0.3  0.0 - 1.2 mg/dL Final   • Globulin 01/06/2023 3.0  gm/dL Final   • A/G Ratio 01/06/2023 1.4  g/dL Final   • BUN/Creatinine Ratio 01/06/2023 12.7  7.0 - 25.0 Final   • Anion Gap 01/06/2023 9.0  5.0 - 15.0 mmol/L Final   • eGFR 01/06/2023 62.7  >60.0 mL/min/1.73 Final    National Kidney Foundation and American Society of Nephrology (ASN) Task Force recommended calculation based on the Chronic Kidney Disease Epidemiology Collaboration (CKD-EPI) equation refit without adjustment for race.   • proBNP 01/06/2023 246.8  0.0 - 900.0 pg/mL Final   • Extra Tube 01/06/2023 Hold for add-ons.   Final    Auto resulted.   • Extra Tube 01/06/2023 hold for add-on   Final    Auto resulted   • Extra Tube 01/06/2023 Hold for add-ons.   Final    Auto resulted.   • Extra Tube 01/06/2023 Hold for add-ons.   Final    Auto resulted   • WBC 01/06/2023 12.39 (H)  3.40 - 10.80 10*3/mm3 Final   • RBC 01/06/2023 4.50  3.77 - 5.28 10*6/mm3 Final   • Hemoglobin 01/06/2023 12.9  12.0 - 15.9 g/dL Final   • Hematocrit 01/06/2023 39.4  34.0 - 46.6 % Final   • MCV 01/06/2023 87.6  79.0 - 97.0 fL Final   • MCH 01/06/2023 28.7  26.6 - 33.0 pg Final   • MCHC 01/06/2023 32.7  31.5 - 35.7 g/dL Final   • RDW 01/06/2023 13.2  12.3 - 15.4 % Final   • RDW-SD 01/06/2023 41.6  37.0 - 54.0 fl Final   • MPV 01/06/2023 9.1  6.0 - 12.0 fL Final   • Platelets 01/06/2023 421  140 - 450 10*3/mm3 Final   • Neutrophil % 01/06/2023 71.2  42.7 - 76.0 % Final   • Lymphocyte % 01/06/2023 17.8 (L)  19.6 - 45.3 % Final   • Monocyte % 01/06/2023 7.4  5.0 - 12.0 % Final   •  Eosinophil % 01/06/2023 2.9  0.3 - 6.2 % Final   • Basophil % 01/06/2023 0.4  0.0 - 1.5 % Final   • Immature Grans % 01/06/2023 0.3  0.0 - 0.5 % Final   • Neutrophils, Absolute 01/06/2023 8.82 (H)  1.70 - 7.00 10*3/mm3 Final   • Lymphocytes, Absolute 01/06/2023 2.20  0.70 - 3.10 10*3/mm3 Final   • Monocytes, Absolute 01/06/2023 0.92 (H)  0.10 - 0.90 10*3/mm3 Final   • Eosinophils, Absolute 01/06/2023 0.36  0.00 - 0.40 10*3/mm3 Final   • Basophils, Absolute 01/06/2023 0.05  0.00 - 0.20 10*3/mm3 Final   • Immature Grans, Absolute 01/06/2023 0.04  0.00 - 0.05 10*3/mm3 Final   • nRBC 01/06/2023 0.0  0.0 - 0.2 /100 WBC Final   • Glucose 01/06/2023 145 (H)  70 - 130 mg/dL Final    RN NotifiedOperator: [id number] DIAZ OLUguilherme ID: [id number]   • Glucose 01/06/2023 137 (H)  70 - 130 mg/dL Final    RN NotifiedOperator: [id number] AGUSTINA TELLEZMeter ID: [id number]   • Glucose 01/07/2023 212 (H)  65 - 99 mg/dL Final   • BUN 01/07/2023 12  8 - 23 mg/dL Final   • Creatinine 01/07/2023 0.97  0.57 - 1.00 mg/dL Final   • Sodium 01/07/2023 139  136 - 145 mmol/L Final   • Potassium 01/07/2023 3.6  3.5 - 5.2 mmol/L Final   • Chloride 01/07/2023 101  98 - 107 mmol/L Final   • CO2 01/07/2023 28.0  22.0 - 29.0 mmol/L Final   • Calcium 01/07/2023 9.2  8.6 - 10.5 mg/dL Final   • BUN/Creatinine Ratio 01/07/2023 12.4  7.0 - 25.0 Final   • Anion Gap 01/07/2023 10.0  5.0 - 15.0 mmol/L Final   • eGFR 01/07/2023 66.6  >60.0 mL/min/1.73 Final    National Kidney Foundation and American Society of Nephrology (ASN) Task Force recommended calculation based on the Chronic Kidney Disease Epidemiology Collaboration (CKD-EPI) equation refit without adjustment for race.   • Magnesium 01/07/2023 1.8  1.6 - 2.4 mg/dL Final   • WBC 01/07/2023 9.28  3.40 - 10.80 10*3/mm3 Final   • RBC 01/07/2023 4.26  3.77 - 5.28 10*6/mm3 Final   • Hemoglobin 01/07/2023 12.2  12.0 - 15.9 g/dL Final   • Hematocrit 01/07/2023 37.5  34.0 - 46.6 % Final   • MCV  01/07/2023 88.0  79.0 - 97.0 fL Final   • MCH 01/07/2023 28.6  26.6 - 33.0 pg Final   • MCHC 01/07/2023 32.5  31.5 - 35.7 g/dL Final   • RDW 01/07/2023 13.3  12.3 - 15.4 % Final   • RDW-SD 01/07/2023 42.8  37.0 - 54.0 fl Final   • MPV 01/07/2023 9.1  6.0 - 12.0 fL Final   • Platelets 01/07/2023 373  140 - 450 10*3/mm3 Final   • Neutrophil % 01/07/2023 66.7  42.7 - 76.0 % Final   • Lymphocyte % 01/07/2023 22.6  19.6 - 45.3 % Final   • Monocyte % 01/07/2023 6.3  5.0 - 12.0 % Final   • Eosinophil % 01/07/2023 3.7  0.3 - 6.2 % Final   • Basophil % 01/07/2023 0.4  0.0 - 1.5 % Final   • Immature Grans % 01/07/2023 0.3  0.0 - 0.5 % Final   • Neutrophils, Absolute 01/07/2023 6.19  1.70 - 7.00 10*3/mm3 Final   • Lymphocytes, Absolute 01/07/2023 2.10  0.70 - 3.10 10*3/mm3 Final   • Monocytes, Absolute 01/07/2023 0.58  0.10 - 0.90 10*3/mm3 Final   • Eosinophils, Absolute 01/07/2023 0.34  0.00 - 0.40 10*3/mm3 Final   • Basophils, Absolute 01/07/2023 0.04  0.00 - 0.20 10*3/mm3 Final   • Immature Grans, Absolute 01/07/2023 0.03  0.00 - 0.05 10*3/mm3 Final   • nRBC 01/07/2023 0.0  0.0 - 0.2 /100 WBC Final   • Glucose 01/07/2023 214 (H)  70 - 130 mg/dL Final    RN NotifiedOperator: [id number] AGUSTINA Dodson ID: [id number]   • Glucose 01/07/2023 219 (H)  70 - 130 mg/dL Final    RN NotifiedOperator: [id number] JOE Dumont ID: [id number]   • Glucose 01/07/2023 184 (H)  70 - 130 mg/dL Final    RN NotifiedOperator: [id number] JOE Dumont ID: [id number]   • Glucose 01/07/2023 124  70 - 130 mg/dL Final    RN NotifiedOperator: [id number] KLARISSA Johnson ID: [id number]   • Glucose 01/08/2023 111 (H)  65 - 99 mg/dL Final   • BUN 01/08/2023 14  8 - 23 mg/dL Final   • Creatinine 01/08/2023 1.10 (H)  0.57 - 1.00 mg/dL Final   • Sodium 01/08/2023 139  136 - 145 mmol/L Final   • Potassium 01/08/2023 3.5  3.5 - 5.2 mmol/L Final   • Chloride 01/08/2023 100  98 - 107 mmol/L Final   • CO2 01/08/2023 28.0  22.0 -  29.0 mmol/L Final   • Calcium 01/08/2023 9.4  8.6 - 10.5 mg/dL Final   • BUN/Creatinine Ratio 01/08/2023 12.7  7.0 - 25.0 Final   • Anion Gap 01/08/2023 11.0  5.0 - 15.0 mmol/L Final   • eGFR 01/08/2023 57.3 (L)  >60.0 mL/min/1.73 Final    National Kidney Foundation and American Society of Nephrology (ASN) Task Force recommended calculation based on the Chronic Kidney Disease Epidemiology Collaboration (CKD-EPI) equation refit without adjustment for race.   • Magnesium 01/08/2023 2.0  1.6 - 2.4 mg/dL Final   • WBC 01/08/2023 9.27  3.40 - 10.80 10*3/mm3 Final   • RBC 01/08/2023 4.64  3.77 - 5.28 10*6/mm3 Final   • Hemoglobin 01/08/2023 13.2  12.0 - 15.9 g/dL Final   • Hematocrit 01/08/2023 40.8  34.0 - 46.6 % Final   • MCV 01/08/2023 87.9  79.0 - 97.0 fL Final   • MCH 01/08/2023 28.4  26.6 - 33.0 pg Final   • MCHC 01/08/2023 32.4  31.5 - 35.7 g/dL Final   • RDW 01/08/2023 13.3  12.3 - 15.4 % Final   • RDW-SD 01/08/2023 42.5  37.0 - 54.0 fl Final   • MPV 01/08/2023 9.2  6.0 - 12.0 fL Final   • Platelets 01/08/2023 401  140 - 450 10*3/mm3 Final   • Neutrophil % 01/08/2023 64.4  42.7 - 76.0 % Final   • Lymphocyte % 01/08/2023 24.3  19.6 - 45.3 % Final   • Monocyte % 01/08/2023 6.9  5.0 - 12.0 % Final   • Eosinophil % 01/08/2023 3.5  0.3 - 6.2 % Final   • Basophil % 01/08/2023 0.5  0.0 - 1.5 % Final   • Immature Grans % 01/08/2023 0.4  0.0 - 0.5 % Final   • Neutrophils, Absolute 01/08/2023 5.97  1.70 - 7.00 10*3/mm3 Final   • Lymphocytes, Absolute 01/08/2023 2.25  0.70 - 3.10 10*3/mm3 Final   • Monocytes, Absolute 01/08/2023 0.64  0.10 - 0.90 10*3/mm3 Final   • Eosinophils, Absolute 01/08/2023 0.32  0.00 - 0.40 10*3/mm3 Final   • Basophils, Absolute 01/08/2023 0.05  0.00 - 0.20 10*3/mm3 Final   • Immature Grans, Absolute 01/08/2023 0.04  0.00 - 0.05 10*3/mm3 Final   • nRBC 01/08/2023 0.0  0.0 - 0.2 /100 WBC Final   • Glucose 01/08/2023 125  70 - 130 mg/dL Final    RN NotifiedOperator: [id number] KLARISSA Johnson  ID: [id number]   • Glucose 01/08/2023 168 (H)  70 - 130 mg/dL Final    RN NotifiedOperator: [id number] CARLIE SEOFERMeter ID: [id number]   • Color, UA 01/08/2023 Yellow  Yellow, Straw, Dark Yellow, Kristel Final   • Appearance, UA 01/08/2023 Clear  Clear Final   • pH, UA 01/08/2023 6.0  5.0 - 9.0 Final   • Specific Gravity, UA 01/08/2023 1.024  1.003 - 1.030 Final   • Glucose, UA 01/08/2023 Negative  Negative Final   • Ketones, UA 01/08/2023 Negative  Negative Final   • Bilirubin, UA 01/08/2023 Negative  Negative Final   • Blood, UA 01/08/2023 Negative  Negative Final   • Protein, UA 01/08/2023 Negative  Negative Final   • Leuk Esterase, UA 01/08/2023 Negative  Negative Final   • Nitrite, UA 01/08/2023 Negative  Negative Final   • Urobilinogen, UA 01/08/2023 1.0 E.U./dL  0.2 - 1.0 E.U./dL Final   • Glucose 01/08/2023 142 (H)  70 - 130 mg/dL Final    RN NotifiedOperator: [id number] CARLIE SEOFERMeter ID: [id number]   • Glucose 01/08/2023 112  70 - 130 mg/dL Final    RN NotifiedOperator: [id number] BERRIOS GABBIFERMeter ID: [id number]   • Glucose 01/09/2023 106 (H)  65 - 99 mg/dL Final   • BUN 01/09/2023 10  8 - 23 mg/dL Final   • Creatinine 01/09/2023 0.99  0.57 - 1.00 mg/dL Final   • Sodium 01/09/2023 136  136 - 145 mmol/L Final   • Potassium 01/09/2023 3.9  3.5 - 5.2 mmol/L Final    Slight hemolysis detected by analyzer. Results may be affected.   • Chloride 01/09/2023 101  98 - 107 mmol/L Final   • CO2 01/09/2023 29.0  22.0 - 29.0 mmol/L Final   • Calcium 01/09/2023 9.1  8.6 - 10.5 mg/dL Final   • BUN/Creatinine Ratio 01/09/2023 10.1  7.0 - 25.0 Final   • Anion Gap 01/09/2023 6.0  5.0 - 15.0 mmol/L Final   • eGFR 01/09/2023 65.0  >60.0 mL/min/1.73 Final    National Kidney Foundation and American Society of Nephrology (ASN) Task Force recommended calculation based on the Chronic Kidney Disease Epidemiology Collaboration (CKD-EPI) equation refit without adjustment for race.   • Magnesium 01/09/2023 2.1  1.6 -  2.4 mg/dL Final   • WBC 01/09/2023 7.24  3.40 - 10.80 10*3/mm3 Final   • RBC 01/09/2023 4.70  3.77 - 5.28 10*6/mm3 Final   • Hemoglobin 01/09/2023 13.5  12.0 - 15.9 g/dL Final   • Hematocrit 01/09/2023 41.1  34.0 - 46.6 % Final   • MCV 01/09/2023 87.4  79.0 - 97.0 fL Final   • MCH 01/09/2023 28.7  26.6 - 33.0 pg Final   • MCHC 01/09/2023 32.8  31.5 - 35.7 g/dL Final   • RDW 01/09/2023 13.4  12.3 - 15.4 % Final   • RDW-SD 01/09/2023 42.3  37.0 - 54.0 fl Final   • MPV 01/09/2023 10.4  6.0 - 12.0 fL Final   • Platelets 01/09/2023 279  140 - 450 10*3/mm3 Final   • Neutrophil % 01/09/2023 58.9  42.7 - 76.0 % Final   • Lymphocyte % 01/09/2023 28.6  19.6 - 45.3 % Final   • Monocyte % 01/09/2023 7.9  5.0 - 12.0 % Final   • Eosinophil % 01/09/2023 3.3  0.3 - 6.2 % Final   • Basophil % 01/09/2023 0.6  0.0 - 1.5 % Final   • Immature Grans % 01/09/2023 0.7 (H)  0.0 - 0.5 % Final   • Neutrophils, Absolute 01/09/2023 4.27  1.70 - 7.00 10*3/mm3 Final   • Lymphocytes, Absolute 01/09/2023 2.07  0.70 - 3.10 10*3/mm3 Final   • Monocytes, Absolute 01/09/2023 0.57  0.10 - 0.90 10*3/mm3 Final   • Eosinophils, Absolute 01/09/2023 0.24  0.00 - 0.40 10*3/mm3 Final   • Basophils, Absolute 01/09/2023 0.04  0.00 - 0.20 10*3/mm3 Final   • Immature Grans, Absolute 01/09/2023 0.05  0.00 - 0.05 10*3/mm3 Final   • nRBC 01/09/2023 0.0  0.0 - 0.2 /100 WBC Final   • Glucose 01/09/2023 106  70 - 130 mg/dL Final    RN NotifiedOperator: [id number] AGUSTINA TELLEZMeter ID: [id number]   • RBC Morphology 01/09/2023 Normal  Normal Final   • WBC Morphology 01/09/2023 Normal  Normal Final   • Platelet Estimate 01/09/2023 Adequate  Normal Final   • Glucose 01/09/2023 117  70 - 130 mg/dL Final    RN NotifiedOperator: [id number] CARLIE TELLEZMetpastora ID: [id number]   • Glucose 01/09/2023 103  70 - 130 mg/dL Final    RN NotifiedOperator: [id number] CARLIE TELLEZMetpastora ID: [id number]   • Glucose 01/09/2023 96  70 - 130 mg/dL Final    RN NotifiedOperator:  [id number] BRI GARCIAMeter ID: [id number]   • Glucose 01/10/2023 118  70 - 130 mg/dL Final    RN NotifiedOperator: [id number] BRI GARCIAMeter ID: [id number]   • Glucose 01/10/2023 146 (H)  70 - 130 mg/dL Final    RN NotifiedOperator: [id number] CARLIE TELLEZMeter ID: [id number]   • Reference Lab Report 01/10/2023    Final                    Value:[facility]  [address] [place] [address]  Phone: [phone] or [phone]  Fax: [phone]  Landen Baker M.D., Medical Director    PATIENT NAME                           LABORATORY NO.  [address]  CHIQUITA BAILEY.                    [id number]  [id number]                         AGE              SEX  SSN           CLIENT REF #  [facility]           63      1960  F    [id number]   [id number]    [place]                       REQUESTING M.D.     ATTENDING M.D.     COPY TO.  [address] [place] [address]                 DARRIAN             ROD[place] [address]             [facility]  DATE COLLECTED      DATE RECEIVED      DATE REPORTED  01/10/2023          2023         2023    DIAGNOSIS:  A.   GASTRIC ANTRUM, BIOPSY:  Reactive gastropathy  B.   GE JUNCTION:  Reactive gastric and squamous mucosa  Negative for specialized Chapman's mucosa    JBS/pah    CLINICAL HISTORY:  Chest pain,                           unspecified type, iron deficiency anemia due to chronic blood loss    SPECIMENS RECEIVED:  A.  GASTRIC ANTRUM, BIOPSY  B.  GE JUNCTION    MICROSCOPIC DESCRIPTION:  Tissue blocks are prepared and slides are examined microscopically on all  specimens. See diagnosis for details.    Professional interpretation rendered by Rigoberto Al M.D. at [facility],  [employer], [address] [place] [address].    GROSS DESCRIPTION:  A.  Specimen is received in 1 formalin filled container labeled \"gastric antrum\"  and consists of 2 portions of tan soft tissue " "measuring 0.8 x 0.4 x 0.1 cm.  The specimen is submitted entirely in 1 cassette.  BW  B.  Specimen is received in 1 formalin filled container labeled \"EG J\" and  consists of a single portion of tan soft tissue measuring 0.3 x 0.3 x 0.3 cm.  The specimen is submitted entirely in 1 cassette.    REVIEWED, DIAGNOSED AND ELECTRONICALLY  SIGNED BY:    Rigoberto Al M.D.  CPT CODES:  88305x2     • Glucose 01/10/2023 187 (H)  70 - 130 mg/dL Final    RN NotifiedOperator: [id number] BRI MADISONMeter ID: [id number]   • Glucose 01/11/2023 121  70 - 130 mg/dL Final    RN NotifiedOperator: [id number] [employer] MADISONMeter ID: [id number]   • Glucose 01/11/2023 205 (H)  70 - 130 mg/dL Final    RN NotifiedOperator: [id number] SHAISTA TIFFANYMeter ID: [id number]     Assessment & Plan      1. Right upper quadrant abdominal pain    1.  Right upper quadrant pain with nausea need to rule out sphincter dysfunction.  Follow alk phos closely.  Add Bentyl.  2.  Epigastric pain, well controlled.  Likely due to gastroparesis.  Continue PPI and Reglan.  3.  Nocturnal cough, improved.  4.  Chest pain, improved.  5.  Obesity, recommend exercise and diet control.       Orders placed during this encounter include:  Orders Placed This Encounter   Procedures   • Hepatic Function Panel     Order Specific Question:   Release to patient     Answer:   Routine Release       * Surgery not found *    Review and/or summary of lab tests, radiology, procedures, medications. Review and summary of old records and obtaining of history. The risks and benefits of my recommendations, as well as other treatment options were discussed with the patient today. Questions were answered.    New Medications Ordered This Visit   Medications   • dicyclomine (BENTYL) 20 MG tablet     Sig: Take 1 tablet by mouth Every 6 (Six) Hours for 30 days.     Dispense:  120 tablet     Refill:  5       Follow-up: Return in about 1 month (around 3/7/2023).               Results " for orders placed or performed in visit on 23   Hepatic Function Panel    Specimen: Blood   Result Value Ref Range    Total Protein 6.8 6.0 - 8.5 g/dL    Albumin 4.1 3.5 - 5.2 g/dL    ALT (SGPT) 20 1 - 33 U/L    AST (SGOT) 21 1 - 32 U/L    Alkaline Phosphatase 111 39 - 117 U/L    Total Bilirubin 0.3 0.0 - 1.2 mg/dL    Bilirubin, Direct <0.2 0.0 - 0.3 mg/dL    Bilirubin, Indirect     Results for orders placed or performed during the hospital encounter of 23   Gold Top - SST   Result Value Ref Range    Extra Tube Hold for add-ons.    Green Top (Gel)   Result Value Ref Range    Extra Tube Hold for add-ons.    Scan Slide    Specimen: Blood   Result Value Ref Range    RBC Morphology Normal Normal    WBC Morphology Normal Normal    Platelet Estimate Adequate Normal   TISSUE EXAM, [facility] (JORGE LUIS,[facility])    Specimen: A: Gastric, Antrum; Tissue    B: Esophagus; Tissue   Result Value Ref Range    Reference Lab Report       [facility]  [address] [place] [address]  Phone: [phone] or [phone]  Fax: [phone]  Landen Baker M.D., Medical Director    PATIENT NAME                           LABORATORY NO.  [address]  CHIQUITA BAILEY.                    [id number]  [id number]                         AGE              SEX  SSN           CLIENT REF #  [facility]           63      1960  F    [id number]   [id number]    [place]                       REQUESTING M.D.     ATTENDING M.D.     COPY TO.  [address] [place] [address]                 RODO COLMENARES DAVID REED, JANE  [address]  DATE COLLECTED      DATE RECEIVED      DATE REPORTED  01/10/2023          2023         2023    DIAGNOSIS:  A.   GASTRIC ANTRUM, BIOPSY:  Reactive gastropathy  B.   GE JUNCTION:  Reactive gastric and squamous mucosa  Negative for specialized Chapman's mucosa    JBS/pah    CLINICAL HISTORY:  Chest pain,  unspecified  "type, iron deficiency anemia due to chronic blood loss    SPECIMENS RECEIVED:  A.  GASTRIC ANTRUM, BIOPSY  B.  GE JUNCTION    MICROSCOPIC DESCRIPTION:  Tissue blocks are prepared and slides are examined microscopically on all  specimens. See diagnosis for details.    Professional interpretation rendered by Rigoberto Al M.D. at [facility],  [employer] [address] [place], [place] [address].    GROSS DESCRIPTION:  A.  Specimen is received in 1 formalin filled container labeled \"gastric antrum\"  and consists of 2 portions of tan soft tissue measuring 0.8 x 0.4 x 0.1 cm.  The specimen is submitted entirely in 1 cassette.  BW  B.  Specimen is received in 1 formalin filled container labeled \"EG J\" and  consists of a single portion of tan soft tissue measuring 0.3 x 0.3 x 0.3 cm.  The specimen is submitted entirely in 1 cassette.    REVIEWED, DIAGNOSED AND ELECTRONICALLY  SIGNED BY:    Rigoberto Al M.D.  CPT CODES:  88305x2     Urinalysis With Culture If Indicated - Urine, Clean Catch    Specimen: Urine, Clean Catch   Result Value Ref Range    Color, UA Yellow Yellow, Straw, Dark Yellow, Kristel    Appearance, UA Clear Clear    pH, UA 6.0 5.0 - 9.0    Specific Gravity, UA 1.024 1.003 - 1.030    Glucose, UA Negative Negative    Ketones, UA Negative Negative    Bilirubin, UA Negative Negative    Blood, UA Negative Negative    Protein, UA Negative Negative    Leuk Esterase, UA Negative Negative    Nitrite, UA Negative Negative    Urobilinogen, UA 1.0 E.U./dL 0.2 - 1.0 E.U./dL   CBC Auto Differential    Specimen: Blood   Result Value Ref Range    WBC 7.24 3.40 - 10.80 10*3/mm3    RBC 4.70 3.77 - 5.28 10*6/mm3    Hemoglobin 13.5 12.0 - 15.9 g/dL    Hematocrit 41.1 34.0 - 46.6 %    MCV 87.4 79.0 - 97.0 fL    MCH 28.7 26.6 - 33.0 pg    MCHC 32.8 31.5 - 35.7 g/dL    RDW 13.4 12.3 - 15.4 %    RDW-SD 42.3 37.0 - 54.0 fl    MPV 10.4 6.0 - 12.0 fL    Platelets 279 140 - 450 10*3/mm3    Neutrophil % 58.9 42.7 - 76.0 %    " Lymphocyte % 28.6 19.6 - 45.3 %    Monocyte % 7.9 5.0 - 12.0 %    Eosinophil % 3.3 0.3 - 6.2 %    Basophil % 0.6 0.0 - 1.5 %    Immature Grans % 0.7 (H) 0.0 - 0.5 %    Neutrophils, Absolute 4.27 1.70 - 7.00 10*3/mm3    Lymphocytes, Absolute 2.07 0.70 - 3.10 10*3/mm3    Monocytes, Absolute 0.57 0.10 - 0.90 10*3/mm3    Eosinophils, Absolute 0.24 0.00 - 0.40 10*3/mm3    Basophils, Absolute 0.04 0.00 - 0.20 10*3/mm3    Immature Grans, Absolute 0.05 0.00 - 0.05 10*3/mm3    nRBC 0.0 0.0 - 0.2 /100 WBC   CBC Auto Differential    Specimen: Blood   Result Value Ref Range    WBC 9.27 3.40 - 10.80 10*3/mm3    RBC 4.64 3.77 - 5.28 10*6/mm3    Hemoglobin 13.2 12.0 - 15.9 g/dL    Hematocrit 40.8 34.0 - 46.6 %    MCV 87.9 79.0 - 97.0 fL    MCH 28.4 26.6 - 33.0 pg    MCHC 32.4 31.5 - 35.7 g/dL    RDW 13.3 12.3 - 15.4 %    RDW-SD 42.5 37.0 - 54.0 fl    MPV 9.2 6.0 - 12.0 fL    Platelets 401 140 - 450 10*3/mm3    Neutrophil % 64.4 42.7 - 76.0 %    Lymphocyte % 24.3 19.6 - 45.3 %    Monocyte % 6.9 5.0 - 12.0 %    Eosinophil % 3.5 0.3 - 6.2 %    Basophil % 0.5 0.0 - 1.5 %    Immature Grans % 0.4 0.0 - 0.5 %    Neutrophils, Absolute 5.97 1.70 - 7.00 10*3/mm3    Lymphocytes, Absolute 2.25 0.70 - 3.10 10*3/mm3    Monocytes, Absolute 0.64 0.10 - 0.90 10*3/mm3    Eosinophils, Absolute 0.32 0.00 - 0.40 10*3/mm3    Basophils, Absolute 0.05 0.00 - 0.20 10*3/mm3    Immature Grans, Absolute 0.04 0.00 - 0.05 10*3/mm3    nRBC 0.0 0.0 - 0.2 /100 WBC   CBC Auto Differential    Specimen: Blood   Result Value Ref Range    WBC 9.28 3.40 - 10.80 10*3/mm3    RBC 4.26 3.77 - 5.28 10*6/mm3    Hemoglobin 12.2 12.0 - 15.9 g/dL    Hematocrit 37.5 34.0 - 46.6 %    MCV 88.0 79.0 - 97.0 fL    MCH 28.6 26.6 - 33.0 pg    MCHC 32.5 31.5 - 35.7 g/dL    RDW 13.3 12.3 - 15.4 %    RDW-SD 42.8 37.0 - 54.0 fl    MPV 9.1 6.0 - 12.0 fL    Platelets 373 140 - 450 10*3/mm3    Neutrophil % 66.7 42.7 - 76.0 %    Lymphocyte % 22.6 19.6 - 45.3 %    Monocyte % 6.3 5.0 - 12.0 %     Eosinophil % 3.7 0.3 - 6.2 %    Basophil % 0.4 0.0 - 1.5 %    Immature Grans % 0.3 0.0 - 0.5 %    Neutrophils, Absolute 6.19 1.70 - 7.00 10*3/mm3    Lymphocytes, Absolute 2.10 0.70 - 3.10 10*3/mm3    Monocytes, Absolute 0.58 0.10 - 0.90 10*3/mm3    Eosinophils, Absolute 0.34 0.00 - 0.40 10*3/mm3    Basophils, Absolute 0.04 0.00 - 0.20 10*3/mm3    Immature Grans, Absolute 0.03 0.00 - 0.05 10*3/mm3    nRBC 0.0 0.0 - 0.2 /100 WBC   CBC Auto Differential    Specimen: Blood   Result Value Ref Range    WBC 12.39 (H) 3.40 - 10.80 10*3/mm3    RBC 4.50 3.77 - 5.28 10*6/mm3    Hemoglobin 12.9 12.0 - 15.9 g/dL    Hematocrit 39.4 34.0 - 46.6 %    MCV 87.6 79.0 - 97.0 fL    MCH 28.7 26.6 - 33.0 pg    MCHC 32.7 31.5 - 35.7 g/dL    RDW 13.2 12.3 - 15.4 %    RDW-SD 41.6 37.0 - 54.0 fl    MPV 9.1 6.0 - 12.0 fL    Platelets 421 140 - 450 10*3/mm3    Neutrophil % 71.2 42.7 - 76.0 %    Lymphocyte % 17.8 (L) 19.6 - 45.3 %    Monocyte % 7.4 5.0 - 12.0 %    Eosinophil % 2.9 0.3 - 6.2 %    Basophil % 0.4 0.0 - 1.5 %    Immature Grans % 0.3 0.0 - 0.5 %    Neutrophils, Absolute 8.82 (H) 1.70 - 7.00 10*3/mm3    Lymphocytes, Absolute 2.20 0.70 - 3.10 10*3/mm3    Monocytes, Absolute 0.92 (H) 0.10 - 0.90 10*3/mm3    Eosinophils, Absolute 0.36 0.00 - 0.40 10*3/mm3    Basophils, Absolute 0.05 0.00 - 0.20 10*3/mm3    Immature Grans, Absolute 0.04 0.00 - 0.05 10*3/mm3    nRBC 0.0 0.0 - 0.2 /100 WBC     *Note: Due to a large number of results and/or encounters for the requested time period, some results have not been displayed. A complete set of results can be found in Results Review.         This document has been electronically signed by Jeremiah Wilkins MD on February 19, 2023 14:30 CST

## 2023-02-20 ENCOUNTER — OFFICE VISIT (OUTPATIENT)
Dept: PODIATRY | Facility: CLINIC | Age: 63
End: 2023-02-20
Payer: COMMERCIAL

## 2023-02-20 VITALS
SYSTOLIC BLOOD PRESSURE: 137 MMHG | HEIGHT: 68 IN | BODY MASS INDEX: 40.62 KG/M2 | HEART RATE: 70 BPM | DIASTOLIC BLOOD PRESSURE: 78 MMHG | WEIGHT: 268 LBS | OXYGEN SATURATION: 98 %

## 2023-02-20 DIAGNOSIS — M79.672 LEFT FOOT PAIN: Primary | ICD-10-CM

## 2023-02-20 DIAGNOSIS — K21.9 GERD WITHOUT ESOPHAGITIS: ICD-10-CM

## 2023-02-20 PROCEDURE — 99213 OFFICE O/P EST LOW 20 MIN: CPT | Performed by: NURSE PRACTITIONER

## 2023-02-20 PROCEDURE — 87070 CULTURE OTHR SPECIMN AEROBIC: CPT | Performed by: NURSE PRACTITIONER

## 2023-02-20 PROCEDURE — 87205 SMEAR GRAM STAIN: CPT | Performed by: NURSE PRACTITIONER

## 2023-02-20 RX ORDER — PANTOPRAZOLE SODIUM 20 MG/1
TABLET, DELAYED RELEASE ORAL
Qty: 90 TABLET | Refills: 3 | OUTPATIENT
Start: 2023-02-20

## 2023-02-20 RX ORDER — PANTOPRAZOLE SODIUM 20 MG/1
40 TABLET, DELAYED RELEASE ORAL DAILY
Qty: 180 TABLET | Refills: 1 | OUTPATIENT
Start: 2023-02-20

## 2023-02-20 RX ORDER — PANTOPRAZOLE SODIUM 20 MG/1
40 TABLET, DELAYED RELEASE ORAL DAILY
Qty: 90 TABLET | Refills: 0 | Status: SHIPPED | OUTPATIENT
Start: 2023-02-20

## 2023-02-20 RX ORDER — HYDROCHLOROTHIAZIDE 12.5 MG/1
TABLET ORAL
Qty: 30 TABLET | Refills: 1 | Status: SHIPPED | OUTPATIENT
Start: 2023-02-20 | End: 2023-03-15

## 2023-02-20 NOTE — PROGRESS NOTES
Ariana Martinez  1960  63 y.o. female  PCP: Dolly cassidy 2023  BS: 147 per pt     02/20/2023    Chief Complaint   Patient presents with   • Right Foot - Follow-up       History of Present Illness    Ariana Martinez is a 63 y.o.female presents to the clinic today with a right 5th toe corn.       Past Medical History:   Diagnosis Date   • Acute bacterial endocarditis 3/13/2021   • Angina, class IV ([facility])    • Anxiety    • Anxiety and depression    • Arthritis    • Benign paroxysmal positional vertigo    • Bladder disorder     has bladder stimulator   • Carpal tunnel syndrome    • CHF (congestive heart failure) ([facility])    • Chronic pain    • Coronary atherosclerosis     hx CABG 2005.  is followed by Dr Kwon   • Depression    • Diabetes mellitus ([facility])     Type 2, controlled   • Diabetic polyneuropathy ([facility])    • Disease of thyroid gland    • Elevated cholesterol    • Female stress incontinence    • Foot pain, left    • Full dentures    • Gastroparesis    • GERD (gastroesophageal reflux disease)    • Hyperlipidemia    • Hypertension    • Low back pain    • Malaise and fatigue    • Multiple joint pain    • Obesity     Refuses to be weighed   • Occlusion and stenosis of bilateral carotid arteries 6/18/2021   • Otalgia     Both   • Perforation of tympanic membrane     Left   • Postoperative wound infection    • Vitamin D deficiency    • Wears glasses     reading         Past Surgical History:   Procedure Laterality Date   • ABDOMINAL SURGERY     • AMPUTATION FOOT     • ANGIOPLASTY      coronary   • ANKLE ARTHROSCOPY Left 02/26/2021    Procedure: Left foot hardware removal, ankle arthroscopy, bone grafting , left foot exostectomy;  Surgeon: Ignacio Lord DPM;  Location: [facility] OR;  Service: Podiatry;  Laterality: Left;   • BREAST BIOPSY Right    • CARDIAC CATHETERIZATION     • CARDIAC CATHETERIZATION N/A 06/20/2017    Procedure: Right Heart Cath;  Surgeon: Can Kwon MD PhD;  Location: [facility]  CATH INVASIVE LOCATION;  Service:    • CARDIAC CATHETERIZATION N/A 02/18/2020    Procedure: Left Heart Cath;  Surgeon: Catalina Cooper MD;  Location: [facility] CATH INVASIVE LOCATION;  Service: Cardiology;  Laterality: N/A;   • CARDIAC CATHETERIZATION N/A 04/06/2022    Procedure: Left Heart Cath;  Surgeon: Sheryl Navas MD;  Location: [facility] CATH INVASIVE LOCATION;  Service: Cardiology;  Laterality: N/A;   • CARPAL TUNNEL RELEASE     • CHOLECYSTECTOMY     • COLONOSCOPY N/A 06/24/2020    Procedure: COLONOSCOPY;  Surgeon: Julián Maldonado MD;  Location: [facility] ENDOSCOPY;  Service: Gastroenterology;  Laterality: N/A;   • CORONARY ARTERY BYPASS GRAFT  2005   • ENDOSCOPY N/A 10/19/2018    Procedure: ESOPHAGOGASTRODUODENOSCOPY possible dilation;  Surgeon: Julián Maldonado MD;  Location: [facility] ENDOSCOPY;  Service: Gastroenterology   • ENDOSCOPY N/A 06/24/2020    Procedure: ESOPHAGOGASTRODUODENOSCOPY WED appt please;  Surgeon: Julián Maldonado MD;  Location: [facility] ENDOSCOPY;  Service: Gastroenterology;  Laterality: N/A;   • ENDOSCOPY N/A 06/10/2022    Procedure: ESOPHAGOGASTRODUODENOSCOPY   room 380;  Surgeon: Jeremiah Wilkins MD;  Location: [facility] ENDOSCOPY;  Service: Gastroenterology;  Laterality: N/A;   • ENDOSCOPY N/A 1/10/2023    Procedure: ESOPHAGOGASTRODUODENOSCOPY;  Surgeon: Jeremiah Wilkins MD;  Location: [facility] ENDOSCOPY;  Service: Gastroenterology;  Laterality: N/A;   • ENDOSCOPY AND COLONOSCOPY     • FOOT SURGERY      Toes   • FOOT SURGERY     • GASTRIC BANDING      Revision, laparoscopic   • HYSTERECTOMY     • INCISION AND DRAINAGE LEG Left 03/12/2021    Procedure: Left ankle arthroscopic irrigation and debridement, screw removal;  Surgeon: Ignacio Lord DPM;  Location: [facility] OR;  Service: Podiatry;  Laterality: Left;   • MOUTH SURGERY     • SALPINGO OOPHORECTOMY     • SHOULDER SURGERY     • SUBTALAR ARTHRODESIS Left 01/16/2019    Procedure: LEFT FOOT HARDWARE REMOVAL, FIFTH METATARSAL , OPEN  REDUCTION INTERNAL FIXATION, CALCANEAL OSTEOTOMY;  Surgeon: Ignacio Lord DPM;  Location: [facility];  Service: Podiatry   • SUBTALAR ARTHRODESIS Left 10/16/2019    Procedure: foot hardware removal, subtalar joint fusion  possible de/reattachment of achilles tendon        (c-arm);  Surgeon: Ignacio Lord DPM;  Location: [facility] OR;  Service: Podiatry   • SUBTALAR ARTHRODESIS Left 09/30/2020    Procedure: subtalar, talonavicular joint arthrodesis.  Removal hardware.          (c-arm);  Surgeon: Ignacio Lord DPM;  Location: [facility];  Service: Podiatry;  Laterality: Left;   • TRANSESOPHAGEAL ECHOCARDIOGRAM (LAMONTE)      With color flow         Family History   Problem Relation Age of Onset   • Diabetes Other    • Heart disease Other    • Hypertension Other    • Heart disease Mother    • Stroke Mother    • Hypertension Mother    • Diabetes Sister    • Heart disease Sister    • Hypertension Sister    • Heart disease Sister    • Diabetes Sister    • Hypertension Sister    • Diabetes Sister    • Diabetes Sister    • Diabetes Sister    • Diabetes Sister        Allergies   Allergen Reactions   • Seroquel [Quetiapine] Anaphylaxis   • Avandia [Rosiglitazone] Swelling   • Morphine And Related Hallucinations   • Oxycodone Hallucinations       Social History     Socioeconomic History   • Marital status:    Tobacco Use   • Smoking status: Never   • Smokeless tobacco: Never   Vaping Use   • Vaping Use: Never used   Substance and Sexual Activity   • Alcohol use: No   • Drug use: No   • Sexual activity: Defer         Current Outpatient Medications   Medication Sig Dispense Refill   • amLODIPine (NORVASC) 5 MG tablet Take 1 tablet by mouth Daily. 90 tablet 1   • ARIPiprazole (ABILIFY) 5 MG tablet TAKE 1 TABLET BY MOUTH EVERY DAY 90 tablet 0   • aspirin  MG tablet Take 1 tablet by mouth Daily. 30 tablet 0   • atorvastatin (LIPITOR) 80 MG tablet Take 1 tablet by mouth Daily. 90 tablet 1   • B-D ULTRAFINE III  SHORT PEN 31G X 8 MM misc USE TO INJECT 5 TIMES A  each 11   • B-D ULTRAFINE III SHORT PEN 31G X 8 MM misc USE UP TO 4 (FOUR) TIMES DAILY WITH INSULIN AS DIRECTED. 200 each 1   • BD SHARPS CONTAINER HOME misc 1 each Take As Directed. 1 each 0   • Blood Glucose Monitoring Suppl (ONE TOUCH ULTRA MINI) w/Device kit Patient will need the Accu-Check Avitio meter 1 each 0   • butalbital-acetaminophen-caffeine (FIORICET, ESGIC) -40 MG per tablet Take one to two tablets by mouth per headache episode as needed. 6 tablet 0   • Calcium Citrate-Vitamin D 250-200 MG-UNIT tablet Take 2 tablets by mouth 2 (two) times a day.     • clopidogrel (PLAVIX) 75 MG tablet Take 1 tablet by mouth Daily. 90 tablet 0   • [employer] Diclofenac Sodium 1 % gel gel APPLY 4 G TOPICALLY TO THE APPROPRIATE AREA AS DIRECTED 2 (TWO) TIMES A DAY. SMALL AMOUNT TO AFFECTED AREA 100 g 0   • cyanocobalamin 1000 MCG/ML injection INJECT 1 ML INTO THE APPROPRIATE MUSCLE AS DIRECTED BY PRESCRIBER EVERY 28 (TWENTY-EIGHT) DAYS. 1 mL 2   • dicyclomine (BENTYL) 20 MG tablet Take 1 tablet by mouth Every 6 (Six) Hours for 30 days. 120 tablet 5   • fluticasone (FLONASE) 50 MCG/ACT nasal spray INSTILL 2 SPRAYS IN EACH NOSTRIL AS DIRECTED BY PROVIDER DAILY 16 mL 5   • folic acid (FOLVITE) 1 MG tablet TAKE 1 TABLET BY MOUTH EVERY DAY 90 tablet 1   • furosemide (LASIX) 40 MG tablet TAKE 1 TABLET BY MOUTH EVERY DAY 30 tablet 5   • gabapentin (NEURONTIN) 100 MG capsule TAKE 1 CAPSULE BY MOUTH EVERY 12 HOURS. 60 capsule 0   • glucose blood (Accu-Chek Guide) test strip 1 each by Other route 4 (Four) Times a Day. To test blood sugar 4X daily. For  Dx E11.65 600 each 1   • glucose monitor monitoring kit 1 each Daily. accucheck eve meter, E11.9 1 each 0   • hydroCHLOROthiazide (HYDRODIURIL) 12.5 MG tablet TAKE 1 TABLET BY MOUTH EVERY DAY 30 tablet 1   • HYDROcodone-acetaminophen (NORCO) 7.5-325 MG per tablet Take 1 tablet by mouth Every 6 (Six) Hours As Needed for  "Moderate Pain. 120 tablet 0   • insulin aspart (NovoLOG FlexPen) 100 UNIT/ML solution pen-injector sc pen Inject up to 45 units  3 TIMES A DAY BEFORE MEALS 90 mL 11   • Insulin Glargine (BASAGLAR KWIKPEN) 100 UNIT/ML injection pen Inject 40 units into the appropriate area every morning and 35 units into the appropriate area every night 30 mL 4   • isosorbide mononitrate (IMDUR) 30 MG 24 hr tablet TAKE 1 TABLET BY MOUTH EVERY DAY 30 tablet 3   • Lancets (accu-chek soft touch) lancets As directed 100 each 12   • levothyroxine (SYNTHROID, LEVOTHROID) 25 MCG tablet Take 1 tablet by mouth Daily. 30 tablet 11   • meclizine (ANTIVERT) 25 MG tablet Take 1 tablet by mouth 3 (Three) Times a Day As Needed for dizziness. 90 tablet 6   • meloxicam (MOBIC) 15 MG tablet TAKE 1 TABLET BY MOUTH EVERY DAY WITH FOOD 30 tablet 3   • methocarbamol (ROBAXIN) 500 MG tablet TAKE 1 TABLET BY MOUTH 2 TIMES A DAY AS NEEDED FOR MUSCLE SPASMS. 60 tablet 5   • metoclopramide (REGLAN) 10 MG tablet TAKE 1 TABLET BY MOUTH 4 (FOUR) TIMES A DAY AS NEEDED (NAUSEA). 120 tablet 0   • metoclopramide (REGLAN) 5 MG tablet Take 1 tablet by mouth 3 (Three) Times a Day As Needed (vomiting). 12 tablet 0   • metoprolol succinate XL (TOPROL-XL) 50 MG 24 hr tablet Take 1 tablet by mouth Daily. Dose increased 5/24/21 90 tablet 1   • naloxone (NARCAN) 0.4 MG/ML injection Infuse 0.5 mL into a venous catheter Every 5 (Five) Minutes As Needed for Opioid Reversal.     • Needle, Disp, (Easy Touch FlipLock Needles) 25G X 1-1/2\" misc 1 each Every 28 (Twenty-Eight) Days. For administering B12 cyanocobalomin. Dx vitamin B12 deficiency. 10 each 3   • Needle, Disp, (Hypodermic Needle) 20G X 1\" misc 1 each Every 28 (Twenty-Eight) Days. For drawing up B12 for injection monthly, change back to smaller gauge needle prior to injection. Dx Vitamin B12  Deficiency. 10 each 2   • nitroglycerin (NITROSTAT) 0.4 MG SL tablet Place 1 tablet under the tongue Every 5 (Five) Minutes As " "Needed for Chest Pain. Take no more than 3 doses in 15 minutes. 20 tablet 11   • ondansetron (ZOFRAN) 8 MG tablet Take 1 tablet by mouth Every 8 (Eight) Hours As Needed for Nausea or Vomiting. 18 tablet 1   • ondansetron ODT (ZOFRAN-ODT) 4 MG disintegrating tablet Place 1 tablet on the tongue 4 (Four) Times a Day As Needed for Nausea or Vomiting. 12 tablet 0   • pantoprazole (PROTONIX) 20 MG EC tablet Take 2 tablets by mouth Daily. 90 tablet 0   • predniSONE (DELTASONE) 5 MG tablet Take twice daily for first 5 days then once daily until gone. 15 tablet 0   • ranolazine (RANEXA) 500 MG 12 hr tablet Take 1 tablet by mouth Every 12 (Twelve) Hours. 180 tablet 3   • sucralfate (Carafate) 1 g tablet Take 1 tablet by mouth 4 (Four) Times a Day. 40 tablet 0   • Syringe 20G X 1-1/2\" 3 ML misc 1 each Every 28 (Twenty-Eight) Days. For injection cyanocobalomin, Dx vit B12 deficiency. 10 each 2   • venlafaxine XR (EFFEXOR-XR) 75 MG 24 hr capsule TAKE 1 CAPSULE BY MOUTH DAILY WITH BREAKFAST. 90 capsule 0   • vitamin D (ERGOCALCIFEROL) 1.25 MG (44619 UT) capsule capsule TAKE 1 CAPSULE BY MOUTH EVERY 7 DAYS. 36 capsule 0     No current facility-administered medications for this visit.       Review of Systems   Constitutional: Negative.    HENT: Negative.    Eyes: Negative.    Respiratory: Negative.    Cardiovascular: Negative.    Gastrointestinal: Negative.    Endocrine: Negative.    Genitourinary: Negative.    Musculoskeletal:        Foot pain   Skin: Negative.    Allergic/Immunologic: Negative.    Neurological: Negative.    Hematological: Negative.    Psychiatric/Behavioral: Negative.          OBJECTIVE    /78   Pulse 70   Ht 172.7 cm (68\")   Wt 122 kg (268 lb)   SpO2 98%   BMI 40.75 kg/m²     Physical Exam  Vitals reviewed.   Constitutional:       Appearance: Normal appearance. She is well-developed.   HENT:      Head: Normocephalic and atraumatic.   Neck:      Trachea: Trachea and phonation normal.   Cardiovascular: "      Pulses:           Dorsalis pedis pulses are 1+ on the right side and 1+ on the left side.        Posterior tibial pulses are 1+ on the right side and 1+ on the left side.   Pulmonary:      Effort: Pulmonary effort is normal. No respiratory distress.   Abdominal:      General: There is no distension.      Palpations: Abdomen is soft.   Musculoskeletal:      Right foot: Normal range of motion.      Left foot: Normal range of motion.        Feet:    Feet:      Right foot:      Toenail Condition: Right toenails are normal.      Left foot:      Skin integrity: Skin integrity normal.      Toenail Condition: Left toenails are normal.   Skin:     General: Skin is warm and dry.   Neurological:      Mental Status: She is alert and oriented to person, place, and time.      GCS: GCS eye subscore is 4. GCS verbal subscore is 5. GCS motor subscore is 6.   Psychiatric:         Speech: Speech normal.         Behavior: Behavior normal. Behavior is cooperative.         Thought Content: Thought content normal.         Judgment: Judgment normal.                Procedures        ASSESSMENT AND PLAN    Diagnoses and all orders for this visit:    1. Left foot pain (Primary)  -     Wound Culture - Wound, Toe, Right          Culture of the discharge recommended follow-up in 1 week for recheck after a Band-Aid and a toe sleeve was applied for padding.  Patient was instructed to contact the office immediately for any worsening symptoms and we will follow-up with her once the cultures have been obtained.  She verbalized understanding plan of care and left with her  both verbalizing understanding prior to discharge          This document has been electronically signed by Panda SPENCE, FNP-PEPITO, ONP-C on February 20, 2023 10:22 CST

## 2023-02-22 DIAGNOSIS — I10 ESSENTIAL HYPERTENSION: Chronic | ICD-10-CM

## 2023-02-23 LAB
BACTERIA SPEC AEROBE CULT: NORMAL
GRAM STN SPEC: NORMAL

## 2023-02-23 RX ORDER — AMLODIPINE BESYLATE 5 MG/1
TABLET ORAL
Qty: 90 TABLET | Refills: 1 | OUTPATIENT
Start: 2023-02-23

## 2023-02-27 ENCOUNTER — TELEPHONE (OUTPATIENT)
Dept: FAMILY MEDICINE CLINIC | Facility: CLINIC | Age: 63
End: 2023-02-27
Payer: COMMERCIAL

## 2023-02-27 ENCOUNTER — OFFICE VISIT (OUTPATIENT)
Dept: PODIATRY | Facility: CLINIC | Age: 63
End: 2023-02-27
Payer: COMMERCIAL

## 2023-02-27 ENCOUNTER — DOCUMENTATION (OUTPATIENT)
Dept: FAMILY MEDICINE CLINIC | Facility: CLINIC | Age: 63
End: 2023-02-27
Payer: COMMERCIAL

## 2023-02-27 VITALS
HEIGHT: 68 IN | DIASTOLIC BLOOD PRESSURE: 72 MMHG | SYSTOLIC BLOOD PRESSURE: 122 MMHG | BODY MASS INDEX: 38.65 KG/M2 | WEIGHT: 255 LBS | OXYGEN SATURATION: 98 % | HEART RATE: 71 BPM

## 2023-02-27 DIAGNOSIS — L84 CORN: ICD-10-CM

## 2023-02-27 DIAGNOSIS — I10 ESSENTIAL HYPERTENSION: Chronic | ICD-10-CM

## 2023-02-27 DIAGNOSIS — M79.672 LEFT FOOT PAIN: Primary | ICD-10-CM

## 2023-02-27 PROCEDURE — 99212 OFFICE O/P EST SF 10 MIN: CPT | Performed by: NURSE PRACTITIONER

## 2023-02-27 RX ORDER — AMLODIPINE BESYLATE 5 MG/1
5 TABLET ORAL DAILY
Qty: 90 TABLET | Refills: 0 | Status: SHIPPED | OUTPATIENT
Start: 2023-02-27

## 2023-02-27 RX ORDER — ONDANSETRON 4 MG/1
4 TABLET, ORALLY DISINTEGRATING ORAL 4 TIMES DAILY PRN
Qty: 12 TABLET | Refills: 0 | Status: SHIPPED | OUTPATIENT
Start: 2023-02-27

## 2023-02-27 NOTE — PROGRESS NOTES
Patient is requesting [facility] use annual lab results from [facility] due to insurance coverage and cost.

## 2023-02-27 NOTE — PROGRESS NOTES
Ariana Martinez  1960  63 y.o. female  PCP: Dolly Hunter Jan 2023  BS: 137 per pt     02/27/2023    Chief Complaint   Patient presents with   • Right Foot - Follow-up, Wound Check       History of Present Illness    Ariana Martinez is a 63 y.o.female presents to the clinic today for a follow up on a right 5th toe corn.       Past Medical History:   Diagnosis Date   • Acute bacterial endocarditis 3/13/2021   • Angina, class IV ([facility])    • Anxiety    • Anxiety and depression    • Arthritis    • Benign paroxysmal positional vertigo    • Bladder disorder     has bladder stimulator   • Carpal tunnel syndrome    • CHF (congestive heart failure) ([facility])    • Chronic pain    • Coronary atherosclerosis     hx CABG 2005.  is followed by Dr Kwon   • Depression    • Diabetes mellitus ([facility])     Type 2, controlled   • Diabetic polyneuropathy ([facility])    • Disease of thyroid gland    • Elevated cholesterol    • Female stress incontinence    • Foot pain, left    • Full dentures    • Gastroparesis    • GERD (gastroesophageal reflux disease)    • Hyperlipidemia    • Hypertension    • Low back pain    • Malaise and fatigue    • Multiple joint pain    • Obesity     Refuses to be weighed   • Occlusion and stenosis of bilateral carotid arteries 6/18/2021   • Otalgia     Both   • Perforation of tympanic membrane     Left   • Postoperative wound infection    • Vitamin D deficiency    • Wears glasses     reading         Past Surgical History:   Procedure Laterality Date   • ABDOMINAL SURGERY     • AMPUTATION FOOT     • ANGIOPLASTY      coronary   • ANKLE ARTHROSCOPY Left 02/26/2021    Procedure: Left foot hardware removal, ankle arthroscopy, bone grafting , left foot exostectomy;  Surgeon: Ignacio Lord DPM;  Location: [facility];  Service: Podiatry;  Laterality: Left;   • BREAST BIOPSY Right    • CARDIAC CATHETERIZATION     • CARDIAC CATHETERIZATION N/A 06/20/2017    Procedure: Right Heart Cath;  Surgeon: Can Kwon MD  PhD;  Location: [facility] CATH INVASIVE LOCATION;  Service:    • CARDIAC CATHETERIZATION N/A 02/18/2020    Procedure: Left Heart Cath;  Surgeon: Catalina Cooper MD;  Location: [facility] CATH INVASIVE LOCATION;  Service: Cardiology;  Laterality: N/A;   • CARDIAC CATHETERIZATION N/A 04/06/2022    Procedure: Left Heart Cath;  Surgeon: Sheryl Navas MD;  Location: [facility] CATH INVASIVE LOCATION;  Service: Cardiology;  Laterality: N/A;   • CARPAL TUNNEL RELEASE     • CHOLECYSTECTOMY     • COLONOSCOPY N/A 06/24/2020    Procedure: COLONOSCOPY;  Surgeon: Julián Maldonado MD;  Location: [facility] ENDOSCOPY;  Service: Gastroenterology;  Laterality: N/A;   • CORONARY ARTERY BYPASS GRAFT  2005   • ENDOSCOPY N/A 10/19/2018    Procedure: ESOPHAGOGASTRODUODENOSCOPY possible dilation;  Surgeon: Julián Maldonado MD;  Location: [facility] ENDOSCOPY;  Service: Gastroenterology   • ENDOSCOPY N/A 06/24/2020    Procedure: ESOPHAGOGASTRODUODENOSCOPY WED appt please;  Surgeon: Julián Maldonado MD;  Location: [facility] ENDOSCOPY;  Service: Gastroenterology;  Laterality: N/A;   • ENDOSCOPY N/A 06/10/2022    Procedure: ESOPHAGOGASTRODUODENOSCOPY   room 380;  Surgeon: Jeremiah Wilkins MD;  Location: [facility] ENDOSCOPY;  Service: Gastroenterology;  Laterality: N/A;   • ENDOSCOPY N/A 1/10/2023    Procedure: ESOPHAGOGASTRODUODENOSCOPY;  Surgeon: Jeremiah Wilkins MD;  Location: [facility] ENDOSCOPY;  Service: Gastroenterology;  Laterality: N/A;   • ENDOSCOPY AND COLONOSCOPY     • FOOT SURGERY      Toes   • FOOT SURGERY     • GASTRIC BANDING      Revision, laparoscopic   • HYSTERECTOMY     • INCISION AND DRAINAGE LEG Left 03/12/2021    Procedure: Left ankle arthroscopic irrigation and debridement, screw removal;  Surgeon: Ignacio Lord DPM;  Location: [facility] OR;  Service: Podiatry;  Laterality: Left;   • MOUTH SURGERY     • SALPINGO OOPHORECTOMY     • SHOULDER SURGERY     • SUBTALAR ARTHRODESIS Left 01/16/2019    Procedure: LEFT FOOT HARDWARE REMOVAL,  FIFTH METATARSAL , OPEN REDUCTION INTERNAL FIXATION, CALCANEAL OSTEOTOMY;  Surgeon: Ignacio Lord DPM;  Location: [facility];  Service: Podiatry   • SUBTALAR ARTHRODESIS Left 10/16/2019    Procedure: foot hardware removal, subtalar joint fusion  possible de/reattachment of achilles tendon        (c-arm);  Surgeon: Ignacio Lord DPM;  Location: [facility];  Service: Podiatry   • SUBTALAR ARTHRODESIS Left 09/30/2020    Procedure: subtalar, talonavicular joint arthrodesis.  Removal hardware.          (c-arm);  Surgeon: Ignacio Lord DPM;  Location: [facility];  Service: Podiatry;  Laterality: Left;   • TRANSESOPHAGEAL ECHOCARDIOGRAM (LAMONTE)      With color flow         Family History   Problem Relation Age of Onset   • Diabetes Other    • Heart disease Other    • Hypertension Other    • Heart disease Mother    • Stroke Mother    • Hypertension Mother    • Diabetes Sister    • Heart disease Sister    • Hypertension Sister    • Heart disease Sister    • Diabetes Sister    • Hypertension Sister    • Diabetes Sister    • Diabetes Sister    • Diabetes Sister    • Diabetes Sister        Allergies   Allergen Reactions   • Seroquel [Quetiapine] Anaphylaxis   • Avandia [Rosiglitazone] Swelling   • Morphine And Related Hallucinations   • Oxycodone Hallucinations       Social History     Socioeconomic History   • Marital status:    Tobacco Use   • Smoking status: Never   • Smokeless tobacco: Never   Vaping Use   • Vaping Use: Never used   Substance and Sexual Activity   • Alcohol use: No   • Drug use: No   • Sexual activity: Defer         Current Outpatient Medications   Medication Sig Dispense Refill   • amLODIPine (NORVASC) 5 MG tablet Take 1 tablet by mouth Daily. 90 tablet 1   • ARIPiprazole (ABILIFY) 5 MG tablet TAKE 1 TABLET BY MOUTH EVERY DAY 90 tablet 0   • aspirin  MG tablet Take 1 tablet by mouth Daily. 30 tablet 0   • atorvastatin (LIPITOR) 80 MG tablet Take 1 tablet by mouth Daily. 90 tablet  1   • B-D ULTRAFINE III SHORT PEN 31G X 8 MM misc USE TO INJECT 5 TIMES A  each 11   • B-D ULTRAFINE III SHORT PEN 31G X 8 MM misc USE UP TO 4 (FOUR) TIMES DAILY WITH INSULIN AS DIRECTED. 200 each 1   • BD SHARPS CONTAINER HOME misc 1 each Take As Directed. 1 each 0   • Blood Glucose Monitoring Suppl (ONE TOUCH ULTRA MINI) w/Device kit Patient will need the Accu-Check Avitio meter 1 each 0   • butalbital-acetaminophen-caffeine (FIORICET, ESGIC) -40 MG per tablet Take one to two tablets by mouth per headache episode as needed. 6 tablet 0   • Calcium Citrate-Vitamin D 250-200 MG-UNIT tablet Take 2 tablets by mouth 2 (two) times a day.     • clopidogrel (PLAVIX) 75 MG tablet Take 1 tablet by mouth Daily. 90 tablet 0   • [employer] Diclofenac Sodium 1 % gel gel APPLY 4 G TOPICALLY TO THE APPROPRIATE AREA AS DIRECTED 2 (TWO) TIMES A DAY. SMALL AMOUNT TO AFFECTED AREA 100 g 0   • cyanocobalamin 1000 MCG/ML injection INJECT 1 ML INTO THE APPROPRIATE MUSCLE AS DIRECTED BY PRESCRIBER EVERY 28 (TWENTY-EIGHT) DAYS. 1 mL 2   • dicyclomine (BENTYL) 20 MG tablet Take 1 tablet by mouth Every 6 (Six) Hours for 30 days. 120 tablet 5   • fluticasone (FLONASE) 50 MCG/ACT nasal spray INSTILL 2 SPRAYS IN EACH NOSTRIL AS DIRECTED BY PROVIDER DAILY 16 mL 5   • folic acid (FOLVITE) 1 MG tablet TAKE 1 TABLET BY MOUTH EVERY DAY 90 tablet 1   • furosemide (LASIX) 40 MG tablet TAKE 1 TABLET BY MOUTH EVERY DAY 30 tablet 5   • gabapentin (NEURONTIN) 100 MG capsule TAKE 1 CAPSULE BY MOUTH EVERY 12 HOURS. 60 capsule 0   • glucose blood (Accu-Chek Guide) test strip 1 each by Other route 4 (Four) Times a Day. To test blood sugar 4X daily. For  Dx E11.65 600 each 1   • glucose monitor monitoring kit 1 each Daily. accucheck eve meter, E11.9 1 each 0   • hydroCHLOROthiazide (HYDRODIURIL) 12.5 MG tablet TAKE 1 TABLET BY MOUTH EVERY DAY 30 tablet 1   • HYDROcodone-acetaminophen (NORCO) 7.5-325 MG per tablet Take 1 tablet by mouth Every 6 (Six)  "Hours As Needed for Moderate Pain. 120 tablet 0   • insulin aspart (NovoLOG FlexPen) 100 UNIT/ML solution pen-injector sc pen Inject up to 45 units  3 TIMES A DAY BEFORE MEALS 90 mL 11   • Insulin Glargine (BASAGLAR KWIKPEN) 100 UNIT/ML injection pen Inject 40 units into the appropriate area every morning and 35 units into the appropriate area every night 30 mL 4   • isosorbide mononitrate (IMDUR) 30 MG 24 hr tablet TAKE 1 TABLET BY MOUTH EVERY DAY 30 tablet 3   • Lancets (accu-chek soft touch) lancets As directed 100 each 12   • levothyroxine (SYNTHROID, LEVOTHROID) 25 MCG tablet Take 1 tablet by mouth Daily. 30 tablet 11   • meclizine (ANTIVERT) 25 MG tablet Take 1 tablet by mouth 3 (Three) Times a Day As Needed for dizziness. 90 tablet 6   • meloxicam (MOBIC) 15 MG tablet TAKE 1 TABLET BY MOUTH EVERY DAY WITH FOOD 30 tablet 3   • methocarbamol (ROBAXIN) 500 MG tablet TAKE 1 TABLET BY MOUTH 2 TIMES A DAY AS NEEDED FOR MUSCLE SPASMS. 60 tablet 5   • metoclopramide (REGLAN) 10 MG tablet TAKE 1 TABLET BY MOUTH 4 (FOUR) TIMES A DAY AS NEEDED (NAUSEA). 120 tablet 0   • metoclopramide (REGLAN) 5 MG tablet Take 1 tablet by mouth 3 (Three) Times a Day As Needed (vomiting). 12 tablet 0   • metoprolol succinate XL (TOPROL-XL) 50 MG 24 hr tablet Take 1 tablet by mouth Daily. Dose increased 5/24/21 90 tablet 1   • naloxone (NARCAN) 0.4 MG/ML injection Infuse 0.5 mL into a venous catheter Every 5 (Five) Minutes As Needed for Opioid Reversal.     • Needle, Disp, (Easy Touch FlipLock Needles) 25G X 1-1/2\" misc 1 each Every 28 (Twenty-Eight) Days. For administering B12 cyanocobalomin. Dx vitamin B12 deficiency. 10 each 3   • Needle, Disp, (Hypodermic Needle) 20G X 1\" misc 1 each Every 28 (Twenty-Eight) Days. For drawing up B12 for injection monthly, change back to smaller gauge needle prior to injection. Dx Vitamin B12  Deficiency. 10 each 2   • nitroglycerin (NITROSTAT) 0.4 MG SL tablet Place 1 tablet under the tongue Every 5 " "(Five) Minutes As Needed for Chest Pain. Take no more than 3 doses in 15 minutes. 20 tablet 11   • ondansetron (ZOFRAN) 8 MG tablet Take 1 tablet by mouth Every 8 (Eight) Hours As Needed for Nausea or Vomiting. 18 tablet 1   • ondansetron ODT (ZOFRAN-ODT) 4 MG disintegrating tablet Place 1 tablet on the tongue 4 (Four) Times a Day As Needed for Nausea or Vomiting. 12 tablet 0   • pantoprazole (PROTONIX) 20 MG EC tablet Take 2 tablets by mouth Daily. 90 tablet 0   • predniSONE (DELTASONE) 5 MG tablet Take twice daily for first 5 days then once daily until gone. 15 tablet 0   • ranolazine (RANEXA) 500 MG 12 hr tablet Take 1 tablet by mouth Every 12 (Twelve) Hours. 180 tablet 3   • sucralfate (Carafate) 1 g tablet Take 1 tablet by mouth 4 (Four) Times a Day. 40 tablet 0   • Syringe 20G X 1-1/2\" 3 ML misc 1 each Every 28 (Twenty-Eight) Days. For injection cyanocobalomin, Dx vit B12 deficiency. 10 each 2   • venlafaxine XR (EFFEXOR-XR) 75 MG 24 hr capsule TAKE 1 CAPSULE BY MOUTH DAILY WITH BREAKFAST. 90 capsule 0   • vitamin D (ERGOCALCIFEROL) 1.25 MG (50827 UT) capsule capsule TAKE 1 CAPSULE BY MOUTH EVERY 7 DAYS. 36 capsule 0     No current facility-administered medications for this visit.       Review of Systems   Constitutional: Negative.    HENT: Negative.    Eyes: Negative.    Respiratory: Negative.    Cardiovascular: Negative.    Gastrointestinal: Negative.    Endocrine: Negative.    Genitourinary: Negative.    Musculoskeletal:        Foot pain   Skin: Negative.    Allergic/Immunologic: Negative.    Neurological: Negative.    Hematological: Negative.    Psychiatric/Behavioral: Negative.          OBJECTIVE    /72   Pulse 71   Ht 172.7 cm (68\")   Wt 116 kg (255 lb)   SpO2 98%   BMI 38.77 kg/m²     Physical Exam  Vitals reviewed.   Constitutional:       Appearance: Normal appearance. She is well-developed.   HENT:      Head: Normocephalic and atraumatic.   Neck:      Trachea: Trachea and phonation normal. "   Cardiovascular:      Pulses:           Dorsalis pedis pulses are 1+ on the right side and 1+ on the left side.        Posterior tibial pulses are 1+ on the right side and 1+ on the left side.   Pulmonary:      Effort: Pulmonary effort is normal. No respiratory distress.   Abdominal:      General: There is no distension.      Palpations: Abdomen is soft.   Musculoskeletal:      Right foot: Normal range of motion.      Left foot: Normal range of motion.        Feet:    Feet:      Right foot:      Toenail Condition: Right toenails are normal.      Left foot:      Skin integrity: Skin integrity normal.      Toenail Condition: Left toenails are normal.   Skin:     General: Skin is warm and dry.   Neurological:      Mental Status: She is alert and oriented to person, place, and time.      GCS: GCS eye subscore is 4. GCS verbal subscore is 5. GCS motor subscore is 6.   Psychiatric:         Speech: Speech normal.         Behavior: Behavior normal. Behavior is cooperative.         Thought Content: Thought content normal.         Judgment: Judgment normal.                Procedures        ASSESSMENT AND PLAN    Diagnoses and all orders for this visit:    1. Left foot pain (Primary)    2. Corn          Culture of the discharge recommended follow-up in 1 week for recheck after a Band-Aid and a toe sleeve was applied for padding.  Patient was instructed to contact the office immediately for any worsening symptoms and we will follow-up with her once the cultures have been obtained.  She verbalized understanding plan of care and left with her  both verbalizing understanding prior to discharge          This document has been electronically signed by Panda SPENCE, FNP-C, ONP-C on February 27, 2023 09:21 CST

## 2023-02-27 NOTE — TELEPHONE ENCOUNTER
amLODIPine (NORVASC) 5 MG tablet    ondansetron ODT (ZOFRAN-ODT) 4 MG    [facility]/pharmacy #[id number] - [place], [place] - [address] [place] [address] [phone] [facility] [phone] [address] [address]

## 2023-02-28 RX ORDER — BLOOD SUGAR DIAGNOSTIC
STRIP MISCELLANEOUS
Refills: 2 | OUTPATIENT
Start: 2023-02-28

## 2023-03-06 DIAGNOSIS — G89.29 CHRONIC RIGHT-SIDED LOW BACK PAIN WITH RIGHT-SIDED SCIATICA: ICD-10-CM

## 2023-03-06 DIAGNOSIS — G54.6 PHANTOM PAIN AFTER AMPUTATION OF LOWER EXTREMITY: Chronic | ICD-10-CM

## 2023-03-06 DIAGNOSIS — M54.41 CHRONIC RIGHT-SIDED LOW BACK PAIN WITH RIGHT-SIDED SCIATICA: ICD-10-CM

## 2023-03-06 RX ORDER — HYDROCODONE BITARTRATE AND ACETAMINOPHEN 7.5; 325 MG/1; MG/1
1 TABLET ORAL EVERY 6 HOURS PRN
Qty: 120 TABLET | Refills: 0 | Status: SHIPPED | OUTPATIENT
Start: 2023-03-06 | End: 2023-04-07 | Stop reason: SDUPTHER

## 2023-03-09 RX ORDER — INSULIN ASPART 100 [IU]/ML
INJECTION, SOLUTION INTRAVENOUS; SUBCUTANEOUS
Qty: 30 ML | Refills: 11 | Status: SHIPPED | OUTPATIENT
Start: 2023-03-09

## 2023-03-10 ENCOUNTER — OFFICE VISIT (OUTPATIENT)
Dept: FAMILY MEDICINE CLINIC | Facility: CLINIC | Age: 63
End: 2023-03-10
Payer: COMMERCIAL

## 2023-03-10 ENCOUNTER — LAB (OUTPATIENT)
Dept: LAB | Facility: HOSPITAL | Age: 63
End: 2023-03-10
Payer: COMMERCIAL

## 2023-03-10 ENCOUNTER — OFFICE VISIT (OUTPATIENT)
Dept: GASTROENTEROLOGY | Facility: CLINIC | Age: 63
End: 2023-03-10
Payer: COMMERCIAL

## 2023-03-10 VITALS
HEART RATE: 71 BPM | DIASTOLIC BLOOD PRESSURE: 62 MMHG | SYSTOLIC BLOOD PRESSURE: 142 MMHG | OXYGEN SATURATION: 98 % | HEIGHT: 68 IN | BODY MASS INDEX: 38.78 KG/M2 | TEMPERATURE: 96 F

## 2023-03-10 VITALS
BODY MASS INDEX: 38.77 KG/M2 | DIASTOLIC BLOOD PRESSURE: 68 MMHG | SYSTOLIC BLOOD PRESSURE: 137 MMHG | HEIGHT: 68 IN | HEART RATE: 75 BPM

## 2023-03-10 DIAGNOSIS — F41.1 GENERALIZED ANXIETY DISORDER: Chronic | ICD-10-CM

## 2023-03-10 DIAGNOSIS — E53.8 B12 DEFICIENCY: ICD-10-CM

## 2023-03-10 DIAGNOSIS — R10.11 RIGHT UPPER QUADRANT ABDOMINAL PAIN: Primary | ICD-10-CM

## 2023-03-10 DIAGNOSIS — L85.3 DRY SKIN: ICD-10-CM

## 2023-03-10 DIAGNOSIS — S88.112A AMPUTATION OF LEFT LOWER EXTREMITY BELOW KNEE: Primary | ICD-10-CM

## 2023-03-10 DIAGNOSIS — F33.1 DEPRESSION, MAJOR, RECURRENT, MODERATE: Chronic | ICD-10-CM

## 2023-03-10 LAB
ALBUMIN SERPL-MCNC: 3.9 G/DL (ref 3.5–5.2)
ALP SERPL-CCNC: 118 U/L (ref 39–117)
ALT SERPL W P-5'-P-CCNC: 18 U/L (ref 1–33)
AST SERPL-CCNC: 19 U/L (ref 1–32)
BILIRUB CONJ SERPL-MCNC: <0.2 MG/DL (ref 0–0.3)
BILIRUB INDIRECT SERPL-MCNC: ABNORMAL MG/DL
BILIRUB SERPL-MCNC: 0.4 MG/DL (ref 0–1.2)
PROT SERPL-MCNC: 7.1 G/DL (ref 6–8.5)

## 2023-03-10 PROCEDURE — 99213 OFFICE O/P EST LOW 20 MIN: CPT | Performed by: NURSE PRACTITIONER

## 2023-03-10 PROCEDURE — 96372 THER/PROPH/DIAG INJ SC/IM: CPT | Performed by: NURSE PRACTITIONER

## 2023-03-10 PROCEDURE — 36415 COLL VENOUS BLD VENIPUNCTURE: CPT | Performed by: INTERNAL MEDICINE

## 2023-03-10 PROCEDURE — 80076 HEPATIC FUNCTION PANEL: CPT | Performed by: INTERNAL MEDICINE

## 2023-03-10 PROCEDURE — 99214 OFFICE O/P EST MOD 30 MIN: CPT | Performed by: INTERNAL MEDICINE

## 2023-03-10 RX ORDER — VENLAFAXINE HYDROCHLORIDE 75 MG/1
75 CAPSULE, EXTENDED RELEASE ORAL
Qty: 90 CAPSULE | Refills: 1 | Status: SHIPPED | OUTPATIENT
Start: 2023-03-10

## 2023-03-10 RX ORDER — ARIPIPRAZOLE 5 MG/1
5 TABLET ORAL DAILY
Qty: 90 TABLET | Refills: 1 | Status: SHIPPED | OUTPATIENT
Start: 2023-03-10

## 2023-03-10 RX ORDER — CYANOCOBALAMIN 1000 UG/ML
1000 INJECTION, SOLUTION INTRAMUSCULAR; SUBCUTANEOUS ONCE
Status: COMPLETED | OUTPATIENT
Start: 2023-03-10 | End: 2023-03-10

## 2023-03-10 RX ADMIN — CYANOCOBALAMIN 1000 MCG: 1000 INJECTION, SOLUTION INTRAMUSCULAR; SUBCUTANEOUS at 08:51

## 2023-03-14 NOTE — PROGRESS NOTES
Subjective   Ariana Martinez is a 63 y.o. female who presents to the office for chronic RIGHT-sided low back pain with sciatica and neuropathy follow-up.  Takes Norco and Gabapentin for these issues.  Has been on for some time, denies adverse side effects.  Has a few issues that she wants to go over today.  Wants to receive a vitamin B 12 injection today which is fine with me.  Also states she is having dry skin right at her elbows.  Is also requesting an order for a new prosthesis, had one for two years but it didn't really ever fit.  Ariana does express a desire to walk again.  Per guidelines for her prosthesis she is currently a K3 candidate.  The design of her prosthesis would be as follows:  Trans-tibial Endo-skeletal Rigid Carbon Frame, Shuttle Lock and Pin System socket with Silicone Locking Liners, Lightweight Alignable components & Flex Walk System Prosthetic Foot.  She has no cognitive or physical conditions that would prevent her from wearing a prosthesis.  Does need refills of Effexor and Abilify for anxiety and depression.  Is stable on her medications.  Spouse present during exam.  This will be her controlled medication visit as well.  History of Present Illness     The following portions of the patient's history were reviewed and updated as appropriate: allergies, current medications, past family history, past medical history, past social history, past surgical history and problem list.    Review of Systems   Constitutional: Negative for chills, fatigue and fever.   HENT: Negative for congestion, sneezing, sore throat and trouble swallowing.    Eyes: Negative for visual disturbance.   Respiratory: Negative for cough, chest tightness, shortness of breath and wheezing.    Cardiovascular: Negative for chest pain, palpitations and leg swelling.   Gastrointestinal: Negative for abdominal pain, constipation, diarrhea, nausea and vomiting.   Genitourinary: Negative for dysuria, frequency and urgency.    Musculoskeletal: Negative for neck pain.   Skin: Positive for rash.   Neurological: Negative for dizziness, weakness and headaches.   Psychiatric/Behavioral:        In the past two weeks the pt has not felt down, depressed, hopeless or lost interest in doing things   All other systems reviewed and are negative.      Objective   Physical Exam  Vitals and nursing note reviewed.   Constitutional:       General: She is not in acute distress.     Appearance: She is well-developed. She is not ill-appearing.   HENT:      Head: Normocephalic and atraumatic.      Right Ear: External ear normal.      Left Ear: External ear normal.      Nose: Nose normal.   Eyes:      General: No scleral icterus.        Right eye: No discharge.         Left eye: No discharge.      Conjunctiva/sclera: Conjunctivae normal.      Pupils: Pupils are equal, round, and reactive to light.   Neck:      Thyroid: No thyromegaly.   Cardiovascular:      Rate and Rhythm: Normal rate and regular rhythm.      Heart sounds: Normal heart sounds. No murmur heard.    No friction rub. No gallop.   Pulmonary:      Effort: Pulmonary effort is normal. No respiratory distress.      Breath sounds: Normal breath sounds. No wheezing or rales.   Abdominal:      General: Bowel sounds are normal. There is no distension.      Palpations: Abdomen is soft.      Tenderness: There is no abdominal tenderness. There is no guarding or rebound.   Musculoskeletal:         General: Normal range of motion.      Cervical back: Normal range of motion and neck supple.   Lymphadenopathy:      Cervical: No cervical adenopathy.   Skin:     General: Skin is warm and dry.      Findings: Rash present. Rash is scaling (bilat forearms).   Neurological:      General: No focal deficit present.      Mental Status: She is alert and oriented to person, place, and time.      GCS: GCS eye subscore is 4. GCS verbal subscore is 5. GCS motor subscore is 6.      Cranial Nerves: No cranial nerve deficit.    Psychiatric:         Behavior: Behavior normal. Behavior is cooperative.         Thought Content: Thought content normal.         Judgment: Judgment normal.         Assessment & Plan   Diagnoses and all orders for this visit:    1. 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 Depression, major, recurrent, moderate (HCC)  Comments:  stable iwth abilify at 5mg.  Orders:  -     ARIPiprazole (ABILIFY) 5 MG tablet; Take 1 tablet by mouth Daily.  Dispense: 90 tablet; Refill: 1  -     venlafaxine XR (EFFEXOR-XR) 75 MG 24 hr capsule; Take 1 capsule by mouth Daily With Breakfast.  Dispense: 90 capsule; Refill: 1    3. Generalized anxiety disorder  Comments:  stable with abilify at 5mg.   Orders:  -     ARIPiprazole (ABILIFY) 5 MG tablet; Take 1 tablet by mouth Daily.  Dispense: 90 tablet; Refill: 1  -     venlafaxine XR (EFFEXOR-XR) 75 MG 24 hr capsule; Take 1 capsule by mouth Daily With Breakfast.  Dispense: 90 capsule; Refill: 1    4. B12 deficiency  -     cyanocobalamin injection 1,000 mcg    5. Dry skin    Will certainly give her a vitamin B12 injection.  I gave her a printed order for a new prosthesis for her to take to that office.      Will continue with medications as ordered.  Advised her to brush the scales off her skin using a baby brush and gentle skin cleanser.       Patient understands the risks associated with this controlled medication, including tolerance and addiction.  Patient also agrees to only obtain this medication from me, and not from a another provider, unless that provider is covering for me in my absence.  Patient also agrees to be compliant in dosing, and not self adjust the dose of medication.  A signed controlled substance agreement is on file, and the patient has received a controlled substance education sheet at this a previous visit.  The patient has also signed a consent for treatment with a controlled substance as per [employer]  health policy. LESTER was obtained, reviewed # [id number].          This document has been electronically signed by BEBE Rob on March 14, 2023 15:09 CDT

## 2023-03-15 RX ORDER — HYDROCHLOROTHIAZIDE 12.5 MG/1
TABLET ORAL
Qty: 30 TABLET | Refills: 2 | Status: SHIPPED | OUTPATIENT
Start: 2023-03-15

## 2023-03-16 DIAGNOSIS — Z79.4 TYPE 2 DIABETES MELLITUS WITH DIABETIC AUTONOMIC NEUROPATHY, WITH LONG-TERM CURRENT USE OF INSULIN: Chronic | ICD-10-CM

## 2023-03-16 DIAGNOSIS — E11.43 TYPE 2 DIABETES MELLITUS WITH DIABETIC AUTONOMIC NEUROPATHY, WITH LONG-TERM CURRENT USE OF INSULIN: Chronic | ICD-10-CM

## 2023-03-16 DIAGNOSIS — G62.9 NEUROPATHY: Chronic | ICD-10-CM

## 2023-03-17 RX ORDER — GABAPENTIN 100 MG/1
CAPSULE ORAL
Qty: 60 CAPSULE | Refills: 0 | Status: SHIPPED | OUTPATIENT
Start: 2023-03-17

## 2023-03-22 ENCOUNTER — TELEPHONE (OUTPATIENT)
Dept: CARDIOLOGY | Facility: CLINIC | Age: 63
End: 2023-03-22
Payer: COMMERCIAL

## 2023-03-22 NOTE — TELEPHONE ENCOUNTER
Called pt notified and understood   Per Dr Snowden----- Message from Bill Gibson MD sent at 3/21/2023  7:29 PM CDT -----  Echocardiogram within acceptable normal limits  ----- Message -----  From: Bill Gibson MD  Sent: 3/17/2023   5:04 PM CDT  To: Bill Gibson MD

## 2023-03-26 DIAGNOSIS — I25.118 CORONARY ARTERY DISEASE OF NATIVE ARTERY OF NATIVE HEART WITH STABLE ANGINA PECTORIS: Chronic | ICD-10-CM

## 2023-03-27 ENCOUNTER — TELEPHONE (OUTPATIENT)
Dept: FAMILY MEDICINE CLINIC | Facility: CLINIC | Age: 63
End: 2023-03-27
Payer: COMMERCIAL

## 2023-03-27 DIAGNOSIS — K21.9 GERD WITHOUT ESOPHAGITIS: ICD-10-CM

## 2023-03-27 RX ORDER — CLOPIDOGREL BISULFATE 75 MG/1
TABLET ORAL
Qty: 90 TABLET | Refills: 0 | OUTPATIENT
Start: 2023-03-27

## 2023-03-27 RX ORDER — ONDANSETRON HYDROCHLORIDE 8 MG/1
8 TABLET, FILM COATED ORAL EVERY 8 HOURS PRN
Qty: 18 TABLET | Refills: 0 | Status: SHIPPED | OUTPATIENT
Start: 2023-03-27

## 2023-03-27 NOTE — PROGRESS NOTES
Chief Complaint   Patient presents with   • Abdominal Pain     RUQ 1 month f/u        Subjective    Ariana Martinez is a 63 y.o. female.    History of Present Illness  Patient presented to GI clinic for follow-up visit today.  Has intermittent symptoms with right upper quadrant pain.  Denies nausea or vomiting.  Bowel movements regular.  Weight is stable.  Dysphagia is well controlled.       The following portions of the patient's history were reviewed and updated as appropriate:   Past Medical History:   Diagnosis Date   • Acute bacterial endocarditis 3/13/2021   • Angina, class IV ([facility])    • Anxiety    • Anxiety and depression    • Arthritis    • Benign paroxysmal positional vertigo    • Bladder disorder     has bladder stimulator   • Carpal tunnel syndrome    • CHF (congestive heart failure) ([facility])    • Chronic pain    • Coronary atherosclerosis     hx CABG 2005.  is followed by Dr Kwon   • Depression    • Diabetes mellitus ([facility])     Type 2, controlled   • Diabetic polyneuropathy ([facility])    • Disease of thyroid gland    • Elevated cholesterol    • Female stress incontinence    • Foot pain, left    • Full dentures    • Gastroparesis    • GERD (gastroesophageal reflux disease)    • Hyperlipidemia    • Hypertension    • Low back pain    • Malaise and fatigue    • Multiple joint pain    • Obesity     Refuses to be weighed   • Occlusion and stenosis of bilateral carotid arteries 6/18/2021   • Otalgia     Both   • Perforation of tympanic membrane     Left   • Postoperative wound infection    • Vitamin D deficiency    • Wears glasses     reading     Past Surgical History:   Procedure Laterality Date   • ABDOMINAL SURGERY     • AMPUTATION FOOT     • ANGIOPLASTY      coronary   • ANKLE ARTHROSCOPY Left 02/26/2021    Procedure: Left foot hardware removal, ankle arthroscopy, bone grafting , left foot exostectomy;  Surgeon: Ignacio Lord DPM;  Location: [facility];  Service: Podiatry;  Laterality: Left;   • BREAST  BIOPSY Right    • CARDIAC CATHETERIZATION     • CARDIAC CATHETERIZATION N/A 06/20/2017    Procedure: Right Heart Cath;  Surgeon: Can Kwon MD PhD;  Location: [facility] CATH INVASIVE LOCATION;  Service:    • CARDIAC CATHETERIZATION N/A 02/18/2020    Procedure: Left Heart Cath;  Surgeon: Catalina Cooper MD;  Location: [facility] CATH INVASIVE LOCATION;  Service: Cardiology;  Laterality: N/A;   • CARDIAC CATHETERIZATION N/A 04/06/2022    Procedure: Left Heart Cath;  Surgeon: Sheryl Navas MD;  Location: [facility] CATH INVASIVE LOCATION;  Service: Cardiology;  Laterality: N/A;   • CARPAL TUNNEL RELEASE     • CHOLECYSTECTOMY     • COLONOSCOPY N/A 06/24/2020    Procedure: COLONOSCOPY;  Surgeon: Julián Maldonado MD;  Location: [facility] ENDOSCOPY;  Service: Gastroenterology;  Laterality: N/A;   • CORONARY ARTERY BYPASS GRAFT  2005   • ENDOSCOPY N/A 10/19/2018    Procedure: ESOPHAGOGASTRODUODENOSCOPY possible dilation;  Surgeon: Julián Maldonado MD;  Location: [facility] ENDOSCOPY;  Service: Gastroenterology   • ENDOSCOPY N/A 06/24/2020    Procedure: ESOPHAGOGASTRODUODENOSCOPY WED appt please;  Surgeon: Julián Maldonado MD;  Location: [facility] ENDOSCOPY;  Service: Gastroenterology;  Laterality: N/A;   • ENDOSCOPY N/A 06/10/2022    Procedure: ESOPHAGOGASTRODUODENOSCOPY   room 380;  Surgeon: Jeremiah Wilkins MD;  Location: [facility] ENDOSCOPY;  Service: Gastroenterology;  Laterality: N/A;   • ENDOSCOPY N/A 1/10/2023    Procedure: ESOPHAGOGASTRODUODENOSCOPY;  Surgeon: Jeremiah Wilkins MD;  Location: [facility] ENDOSCOPY;  Service: Gastroenterology;  Laterality: N/A;   • ENDOSCOPY AND COLONOSCOPY     • FOOT SURGERY      Toes   • FOOT SURGERY     • GASTRIC BANDING      Revision, laparoscopic   • HYSTERECTOMY     • INCISION AND DRAINAGE LEG Left 03/12/2021    Procedure: Left ankle arthroscopic irrigation and debridement, screw removal;  Surgeon: Ignacio Lord DPM;  Location: [facility] OR;  Service: Podiatry;  Laterality: Left;    • MOUTH SURGERY     • SALPINGO OOPHORECTOMY     • SHOULDER SURGERY     • SUBTALAR ARTHRODESIS Left 2019    Procedure: LEFT FOOT HARDWARE REMOVAL, FIFTH METATARSAL , OPEN REDUCTION INTERNAL FIXATION, CALCANEAL OSTEOTOMY;  Surgeon: Ignacio Lord DPM;  Location: [facility];  Service: Podiatry   • SUBTALAR ARTHRODESIS Left 10/16/2019    Procedure: foot hardware removal, subtalar joint fusion  possible de/reattachment of achilles tendon        (c-arm);  Surgeon: Ignacio Lord DPM;  Location: [facility] OR;  Service: Podiatry   • SUBTALAR ARTHRODESIS Left 2020    Procedure: subtalar, talonavicular joint arthrodesis.  Removal hardware.          (c-arm);  Surgeon: Ignacio Lord DPM;  Location: [facility];  Service: Podiatry;  Laterality: Left;   • TRANSESOPHAGEAL ECHOCARDIOGRAM (LAMONTE)      With color flow     Family History   Problem Relation Age of Onset   • Diabetes Other    • Heart disease Other    • Hypertension Other    • Heart disease Mother    • Stroke Mother    • Hypertension Mother    • Diabetes Sister    • Heart disease Sister    • Hypertension Sister    • Heart disease Sister    • Diabetes Sister    • Hypertension Sister    • Diabetes Sister    • Diabetes Sister    • Diabetes Sister    • Diabetes Sister      OB History        0    Para   0    Term   0       0    AB   0    Living   0       SAB   0    IAB   0    Ectopic   0    Molar        Multiple   0    Live Births                  Prior to Admission medications    Medication Sig Start Date End Date Taking? Authorizing Provider   amLODIPine (NORVASC) 5 MG tablet Take 1 tablet by mouth Daily. 23  Yes Dolly Foss APRN   ARIPiprazole (ABILIFY) 5 MG tablet Take 1 tablet by mouth Daily. 3/10/23  Yes Dolly Foss APRN   aspirin  MG tablet Take 1 tablet by mouth Daily. 22  Yes Mahin Esteves MD   atorvastatin (LIPITOR) 80 MG tablet Take 1 tablet by mouth Daily. 22  Yes Dolly Foss APRN   B-D  ULTRAFINE III SHORT PEN 31G X 8 MM misc USE TO INJECT 5 TIMES A DAY 11/11/22  Yes Elvis Gamboa APRN   B-D ULTRAFINE III SHORT PEN 31G X 8 MM misc USE UP TO 4 (FOUR) TIMES DAILY WITH INSULIN AS DIRECTED. 12/27/22  Yes Dolly Foss APRN   BD SHARPS CONTAINER HOME misc 1 each Take As Directed. 9/7/18  Yes Francisca Fong MD   Blood Glucose Monitoring Suppl (ONE TOUCH ULTRA MINI) w/Device kit Patient will need the Accu-Check Avitio meter 10/18/21  Yes Kimberly Ferguson APRN   butalbital-acetaminophen-caffeine (FIORICET, ESGIC) -40 MG per tablet Take one to two tablets by mouth per headache episode as needed. 1/4/23  Yes Dolly Foss APRN   Calcium Citrate-Vitamin D 250-200 MG-UNIT tablet Take 2 tablets by mouth 2 (two) times a day.   Yes Provider, MD Esther   clopidogrel (PLAVIX) 75 MG tablet Take 1 tablet by mouth Daily. 10/3/22  Yes Dolly Foss APRN   [facility] Diclofenac Sodium 1 % gel gel APPLY 4 G TOPICALLY TO THE APPROPRIATE AREA AS DIRECTED 2 (TWO) TIMES A DAY. SMALL AMOUNT TO AFFECTED AREA 1/3/23  Yes Rohan Lazo MD   cyanocobalamin 1000 MCG/ML injection INJECT 1 ML INTO THE APPROPRIATE MUSCLE AS DIRECTED BY PRESCRIBER EVERY 28 (TWENTY-EIGHT) DAYS. 1/17/23  Yes Dolly Foss APRN   fluticasone (FLONASE) 50 MCG/ACT nasal spray INSTILL 2 SPRAYS IN EACH NOSTRIL AS DIRECTED BY PROVIDER DAILY 2/1/23  Yes Dolly Foss APRN   folic acid (FOLVITE) 1 MG tablet TAKE 1 TABLET BY MOUTH EVERY DAY 8/22/22  Yes Teressa Hammond APRN   furosemide (LASIX) 40 MG tablet TAKE 1 TABLET BY MOUTH EVERY DAY 2/10/23  Yes Dolly Foss APRN   glucose blood (Accu-Chek Guide) test strip 1 each by Other route 4 (Four) Times a Day. To test blood sugar 4X daily. For  Dx E11.65 2/28/23  Yes Kimberly Ferguson APRN   glucose monitor monitoring kit 1 each Daily. accucheck eve meter, E11.9 6/11/20  Yes Elvis Gamboa APRN   HYDROcodone-acetaminophen (NORCO) 7.5-325 MG per  "tablet Take 1 tablet by mouth Every 6 (Six) Hours As Needed for Moderate Pain. Pls fill on 3/10 3/6/23  Yes Dolly Foss APRN   insulin aspart (NovoLOG FlexPen) 100 UNIT/ML solution pen-injector sc pen INJECT 30 UNITS UNDER SKIN AS DIRECTED 3 TIMES A DAY WITH MEALS 3/9/23  Yes Elvis Gamboa APRN   Insulin Glargine (BASAGLAR KWIKPEN) 100 UNIT/ML injection pen Inject 40 units into the appropriate area every morning and 35 units into the appropriate area every night 10/24/22  Yes Elvis Gamboa APRN   isosorbide mononitrate (IMDUR) 30 MG 24 hr tablet TAKE 1 TABLET BY MOUTH EVERY DAY 1/13/23  Yes Dolly Foss APRN   Lancets (accu-chek soft touch) lancets As directed 10/18/21  Yes Kimberly Ferguson APRN   levothyroxine (SYNTHROID, LEVOTHROID) 25 MCG tablet Take 1 tablet by mouth Daily. 10/3/22  Yes Elvis Gamboa APRN   meclizine (ANTIVERT) 25 MG tablet Take 1 tablet by mouth 3 (Three) Times a Day As Needed for dizziness. 12/14/17  Yes Evon Hernandez APRN   meloxicam (MOBIC) 15 MG tablet TAKE 1 TABLET BY MOUTH EVERY DAY WITH FOOD 12/7/22  Yes Rohan Lazo MD   methocarbamol (ROBAXIN) 500 MG tablet TAKE 1 TABLET BY MOUTH 2 TIMES A DAY AS NEEDED FOR MUSCLE SPASMS. 6/7/22  Yes Kimberly Ferguson APRN   metoclopramide (REGLAN) 10 MG tablet TAKE 1 TABLET BY MOUTH 4 (FOUR) TIMES A DAY AS NEEDED (NAUSEA). 11/29/22  Yes Dolly Foss APRN   metoclopramide (REGLAN) 5 MG tablet Take 1 tablet by mouth 3 (Three) Times a Day As Needed (vomiting). 12/27/22  Yes Addi Johnson MD   metoprolol succinate XL (TOPROL-XL) 50 MG 24 hr tablet Take 1 tablet by mouth Daily. Dose increased 5/24/21 12/27/22  Yes Dolly Foss APRN   naloxone (NARCAN) 0.4 MG/ML injection Infuse 0.5 mL into a venous catheter Every 5 (Five) Minutes As Needed for Opioid Reversal. 3/13/21  Yes Nick Faye MD   Needle, Disp, (Easy Touch FlipLock Needles) 25G X 1-1/2\" misc 1 each Every 28 " "(Twenty-Eight) Days. For administering B12 cyanocobalomin. Dx vitamin B12 deficiency. 5/24/22  Yes Kimberly Ferguson APRN   Needle, Disp, (Hypodermic Needle) 20G X 1\" misc 1 each Every 28 (Twenty-Eight) Days. For drawing up B12 for injection monthly, change back to smaller gauge needle prior to injection. Dx Vitamin B12  Deficiency. 5/24/22  Yes Kimberly Ferguson APRN   nitroglycerin (NITROSTAT) 0.4 MG SL tablet Place 1 tablet under the tongue Every 5 (Five) Minutes As Needed for Chest Pain. Take no more than 3 doses in 15 minutes. 4/26/22  Yes Kimberly Ferguson APRN   ondansetron (ZOFRAN) 8 MG tablet Take 1 tablet by mouth Every 8 (Eight) Hours As Needed for Nausea or Vomiting. 12/7/22  Yes Dolly Foss APRN   ondansetron ODT (ZOFRAN-ODT) 4 MG disintegrating tablet Place 1 tablet on the tongue 4 (Four) Times a Day As Needed for Nausea or Vomiting. 2/27/23  Yes Dolly Foss APRN   pantoprazole (PROTONIX) 20 MG EC tablet Take 2 tablets by mouth Daily. 2/20/23  Yes Dolly Foss APRN   predniSONE (DELTASONE) 5 MG tablet Take twice daily for first 5 days then once daily until gone. 1/17/23  Yes Meera Morales APRN   ranolazine (RANEXA) 500 MG 12 hr tablet Take 1 tablet by mouth Every 12 (Twelve) Hours. 6/29/22  Yes Bill Gibson MD   sucralfate (Carafate) 1 g tablet Take 1 tablet by mouth 4 (Four) Times a Day. 1/11/23  Yes Marleny Montoya PA-C   Syringe 20G X 1-1/2\" 3 ML misc 1 each Every 28 (Twenty-Eight) Days. For injection cyanocobalomin, Dx vit B12 deficiency. 5/24/22  Yes Kimberly Ferguson APRN   venlafaxine XR (EFFEXOR-XR) 75 MG 24 hr capsule Take 1 capsule by mouth Daily With Breakfast. 3/10/23  Yes Dolly Foss APRN   vitamin D (ERGOCALCIFEROL) 1.25 MG (73217 UT) capsule capsule TAKE 1 CAPSULE BY MOUTH EVERY 7 DAYS. 12/29/22  Yes Dolly Foss APRN   gabapentin (NEURONTIN) 100 MG capsule TAKE 1 CAPSULE BY MOUTH EVERY 12 HOURS 3/17/23   Dolly Foss APRN " "  hydroCHLOROthiazide (HYDRODIURIL) 12.5 MG tablet TAKE 1 TABLET BY MOUTH EVERY DAY 3/15/23   Dolly Foss APRN     Allergies   Allergen Reactions   • Seroquel [Quetiapine] Anaphylaxis   • Avandia [Rosiglitazone] Swelling   • Morphine And Related Hallucinations   • Oxycodone Hallucinations     Social History     Socioeconomic History   • Marital status:    Tobacco Use   • Smoking status: Never   • Smokeless tobacco: Never   Vaping Use   • Vaping Use: Never used   Substance and Sexual Activity   • Alcohol use: No   • Drug use: No   • Sexual activity: Defer       Review of Systems  Review of Systems   Constitutional: Negative for chills, fatigue, fever and unexpected weight change.   HENT: Negative for congestion, ear discharge, hearing loss, nosebleeds and sore throat.    Eyes: Negative for pain, discharge and redness.   Respiratory: Negative for cough, chest tightness, shortness of breath and wheezing.    Cardiovascular: Negative for chest pain and palpitations.   Gastrointestinal: Positive for abdominal pain. Negative for abdominal distention, blood in stool, constipation, diarrhea, nausea and vomiting.   Endocrine: Negative for cold intolerance, polydipsia, polyphagia and polyuria.   Genitourinary: Negative for dysuria, flank pain, frequency, hematuria and urgency.   Musculoskeletal: Negative for arthralgias, back pain, joint swelling and myalgias.   Skin: Negative for color change, pallor and rash.   Neurological: Negative for tremors, seizures, syncope, weakness and headaches.   Hematological: Negative for adenopathy. Does not bruise/bleed easily.   Psychiatric/Behavioral: Negative for behavioral problems, confusion, dysphoric mood, hallucinations and suicidal ideas. The patient is not nervous/anxious.         /68 (BP Location: Right arm)   Pulse 75   Ht 172.7 cm (68\")   BMI 38.77 kg/m²     Objective    Physical Exam  Constitutional:       Appearance: She is well-developed.   HENT:      Head: " Normocephalic and atraumatic.   Eyes:      Conjunctiva/sclera: Conjunctivae normal.      Pupils: Pupils are equal, round, and reactive to light.   Neck:      Thyroid: No thyromegaly.   Cardiovascular:      Rate and Rhythm: Normal rate and regular rhythm.      Heart sounds: Normal heart sounds. No murmur heard.  Pulmonary:      Effort: Pulmonary effort is normal.      Breath sounds: Normal breath sounds. No wheezing.   Abdominal:      General: Bowel sounds are normal. There is no distension.      Palpations: Abdomen is soft. There is no mass.      Tenderness: There is no abdominal tenderness.      Hernia: No hernia is present.   Genitourinary:     Comments: No lesions noted  Musculoskeletal:         General: No tenderness. Normal range of motion.      Cervical back: Normal range of motion and neck supple.   Lymphadenopathy:      Cervical: No cervical adenopathy.   Skin:     General: Skin is warm and dry.      Findings: No rash.   Neurological:      Mental Status: She is alert and oriented to person, place, and time.      Cranial Nerves: No cranial nerve deficit.   Psychiatric:         Thought Content: Thought content normal.       Office Visit on 02/20/2023   Component Date Value Ref Range Status   • Wound Culture 02/20/2023 No growth at 3 days   Final   • Gram Stain 02/20/2023 No WBCs or organisms seen   Final     Assessment & Plan      1. Right upper quadrant abdominal pain    1.  Right upper quadrant pain rule out sphincter Oddi dysfunction.  Obtain LFTs today.  Change Bentyl to Levsin.  2.  Epigastric pain and nausea, well controlled.  Likely due to gastroparesis.  Continue PPI and Reglan.  3.  Chest pain, well controlled.  Continue PPI.  4.  Nocturnal cough, resolved.  Likely due to GERD.  5.  Obesity, recommend exercise and diet control.       Orders placed during this encounter include:  Orders Placed This Encounter   Procedures   • Hepatic Function Panel     Order Specific Question:   Release to patient      Answer:   Routine Release       * Surgery not found *    Review and/or summary of lab tests, radiology, procedures, medications. Review and summary of old records and obtaining of history. The risks and benefits of my recommendations, as well as other treatment options were discussed with the patient today. Questions were answered.    No orders of the defined types were placed in this encounter.      Follow-up: Return in about 1 month (around 4/10/2023).               Results for orders placed or performed in visit on 03/17/23   Adult Transthoracic Echo Complete w/ Color, Spectral and Contrast if Necessary Per Protocol   Result Value Ref Range    Target HR (85%) 135 bpm    Max. Pred. HR (100%) 159 bpm    EF(MOD-bp) 62.0 %    LVIDd 4.7 cm    LVIDs 3.1 cm    IVSd 1.13 cm    LVPWd 1.17 cm    FS 34.7 %    IVS/LVPW 0.97 cm    ESV(cubed) 29.6 ml    EDV(cubed) 106.4 ml    LVOT area 3.7 cm2    LV mass(C)d 202.4 grams    LVOT diam 2.16 cm    EDV(MOD-sp2) 92.3 ml    EDV(MOD-sp4) 96.8 ml    ESV(MOD-sp2) 32.7 ml    ESV(MOD-sp4) 32.8 ml    SV(MOD-sp2) 59.6 ml    SV(MOD-sp4) 64.0 ml    EF(MOD-sp2) 64.6 %    EF(MOD-sp4) 66.1 %    MV E max curt 93.3 cm/sec    MV A max curt 96.9 cm/sec    MV dec time 0.22 msec    MV E/A 0.96     Med Peak E' Curt 6.1 cm/sec    Lat Peak E' Curt 10.2 cm/sec    Avg E/e' ratio 11.45     SV(LVOT) 86.5 ml    LA dimension (2D)  4.0 cm    LV V1 max 93.3 cm/sec    LV V1 max PG 3.5 mmHg    LV V1 mean PG 2.33 mmHg    LV V1 VTI 23.7 cm    Ao pk curt 133.5 cm/sec    Ao max PG 7.1 mmHg    Ao mean PG 3.9 mmHg    Ao V2 VTI 30.7 cm    ASHLEY(I,D) 2.8 cm2    MV max PG 5.1 mmHg    MV mean PG 1.77 mmHg    MV V2 VTI 27.6 cm    MVA(VTI) 3.1 cm2    MV dec slope 417.9 cm/sec2    MR max curt 341.7 cm/sec    MR max PG 46.7 mmHg    RV V1 max PG 5.3 mmHg    RV V1 max 115.3 cm/sec    RV V1 VTI 25.8 cm    PA V2 max 115.5 cm/sec    Ao root diam 2.6 cm    ACS 1.78 cm    Sinus 3.5 cm   Results for orders placed or performed in visit on  03/10/23   Hepatic Function Panel    Specimen: Blood   Result Value Ref Range    Total Protein 7.1 6.0 - 8.5 g/dL    Albumin 3.9 3.5 - 5.2 g/dL    ALT (SGPT) 18 1 - 33 U/L    AST (SGOT) 19 1 - 32 U/L    Alkaline Phosphatase 118 (H) 39 - 117 U/L    Total Bilirubin 0.4 0.0 - 1.2 mg/dL    Bilirubin, Direct <0.2 0.0 - 0.3 mg/dL    Bilirubin, Indirect     Results for orders placed or performed in visit on 23   Wound Culture - Wound, Toe, Right    Specimen: Toe, Right; Wound   Result Value Ref Range    Wound Culture No growth at 3 days     Gram Stain No WBCs or organisms seen    Results for orders placed or performed in visit on 23   Hepatic Function Panel    Specimen: Blood   Result Value Ref Range    Total Protein 6.8 6.0 - 8.5 g/dL    Albumin 4.1 3.5 - 5.2 g/dL    ALT (SGPT) 20 1 - 33 U/L    AST (SGOT) 21 1 - 32 U/L    Alkaline Phosphatase 111 39 - 117 U/L    Total Bilirubin 0.3 0.0 - 1.2 mg/dL    Bilirubin, Direct <0.2 0.0 - 0.3 mg/dL    Bilirubin, Indirect     Results for orders placed or performed during the hospital encounter of 23   Gold Top - SST   Result Value Ref Range    Extra Tube Hold for add-ons.    Green Top (Gel)   Result Value Ref Range    Extra Tube Hold for add-ons.    Scan Slide    Specimen: Blood   Result Value Ref Range    RBC Morphology Normal Normal    WBC Morphology Normal Normal    Platelet Estimate Adequate Normal   TISSUE EXAM, [facility] ([facility])    Specimen: A: Gastric, Antrum; Tissue    B: Esophagus; Tissue   Result Value Ref Range    Reference Lab Report       [facility]  [address] [place] [address]  Phone: [phone] or [phone]  Fax: [phone]  Landen Baker M.D., Medical Director    PATIENT NAME                           LABORATORY NO.  [address]  CHIQUITA BAILEY.                    [id number]  [id number]                         AGE              SEX  SSN           CLIENT REF #  [facility]           63  "     1960      [id number]   [id number]    [place]                       REQUESTING M.D.     ATTENDING M.D.     COPY TO.  [address] [place] [address]                 RODO COLMENARES DAVID [place], [place] [address]             AURELIO  DATE COLLECTED      DATE RECEIVED      DATE REPORTED  01/10/2023          01/11/2023         01/13/2023    DIAGNOSIS:  A.   GASTRIC ANTRUM, BIOPSY:  Reactive gastropathy  B.   GE JUNCTION:  Reactive gastric and squamous mucosa  Negative for specialized Chapman's mucosa    JBS/pah    CLINICAL HISTORY:  Chest pain,  unspecified type, iron deficiency anemia due to chronic blood loss    SPECIMENS RECEIVED:  A.  GASTRIC ANTRUM, BIOPSY  B.  GE JUNCTION    MICROSCOPIC DESCRIPTION:  Tissue blocks are prepared and slides are examined microscopically on all  specimens. See diagnosis for details.    Professional interpretation rendered by Rigoberto Al M.D. at [facility] [employer] [facility],  [place], [address] [place] [address].    GROSS DESCRIPTION:  A.  Specimen is received in 1 formalin filled container labeled \"gastric antrum\"  and consists of 2 portions of tan soft tissue measuring 0.8 x 0.4 x 0.1 cm.  The specimen is submitted entirely in 1 cassette.  BW  B.  Specimen is received in 1 formalin filled container labeled \"EG J\" and  consists of a single portion of tan soft tissue measuring 0.3 x 0.3 x 0.3 cm.  The specimen is submitted entirely in 1 cassette.    REVIEWED, DIAGNOSED AND ELECTRONICALLY  SIGNED BY:    Rigoberto Al M.D.  CPT CODES:  88305x2     Urinalysis With Culture If Indicated - Urine, Clean Catch    Specimen: Urine, Clean Catch   Result Value Ref Range    Color, UA Yellow Yellow, Straw, Dark Yellow, Kristel    Appearance, UA Clear Clear    pH, UA 6.0 5.0 - 9.0    Specific Gravity, UA 1.024 1.003 - 1.030    Glucose, UA Negative Negative    Ketones, UA Negative Negative    Bilirubin, UA Negative Negative    Blood, UA Negative Negative    Protein, UA " Negative Negative    Leuk Esterase, UA Negative Negative    Nitrite, UA Negative Negative    Urobilinogen, UA 1.0 E.U./dL 0.2 - 1.0 E.U./dL   CBC Auto Differential    Specimen: Blood   Result Value Ref Range    WBC 7.24 3.40 - 10.80 10*3/mm3    RBC 4.70 3.77 - 5.28 10*6/mm3    Hemoglobin 13.5 12.0 - 15.9 g/dL    Hematocrit 41.1 34.0 - 46.6 %    MCV 87.4 79.0 - 97.0 fL    MCH 28.7 26.6 - 33.0 pg    MCHC 32.8 31.5 - 35.7 g/dL    RDW 13.4 12.3 - 15.4 %    RDW-SD 42.3 37.0 - 54.0 fl    MPV 10.4 6.0 - 12.0 fL    Platelets 279 140 - 450 10*3/mm3    Neutrophil % 58.9 42.7 - 76.0 %    Lymphocyte % 28.6 19.6 - 45.3 %    Monocyte % 7.9 5.0 - 12.0 %    Eosinophil % 3.3 0.3 - 6.2 %    Basophil % 0.6 0.0 - 1.5 %    Immature Grans % 0.7 (H) 0.0 - 0.5 %    Neutrophils, Absolute 4.27 1.70 - 7.00 10*3/mm3    Lymphocytes, Absolute 2.07 0.70 - 3.10 10*3/mm3    Monocytes, Absolute 0.57 0.10 - 0.90 10*3/mm3    Eosinophils, Absolute 0.24 0.00 - 0.40 10*3/mm3    Basophils, Absolute 0.04 0.00 - 0.20 10*3/mm3    Immature Grans, Absolute 0.05 0.00 - 0.05 10*3/mm3    nRBC 0.0 0.0 - 0.2 /100 WBC     *Note: Due to a large number of results and/or encounters for the requested time period, some results have not been displayed. A complete set of results can be found in Results Review.         This document has been electronically signed by Jeremiah Wilkins MD on March 27, 2023 11:58 CDT

## 2023-03-27 NOTE — TELEPHONE ENCOUNTER
ondansetron (ZOFRAN) 8 MG tablet    WANTS PILLS NOT THE ONES UNDER TONGUE    [facility]

## 2023-03-28 ENCOUNTER — OFFICE VISIT (OUTPATIENT)
Dept: PODIATRY | Facility: CLINIC | Age: 63
End: 2023-03-28
Payer: COMMERCIAL

## 2023-03-28 VITALS — HEART RATE: 64 BPM | HEIGHT: 68 IN | WEIGHT: 255 LBS | BODY MASS INDEX: 38.65 KG/M2 | OXYGEN SATURATION: 95 %

## 2023-03-28 DIAGNOSIS — M79.672 LEFT FOOT PAIN: Primary | ICD-10-CM

## 2023-03-28 DIAGNOSIS — I25.118 CORONARY ARTERY DISEASE OF NATIVE ARTERY OF NATIVE HEART WITH STABLE ANGINA PECTORIS: Chronic | ICD-10-CM

## 2023-03-28 DIAGNOSIS — L84 CORN: ICD-10-CM

## 2023-03-28 PROCEDURE — 99212 OFFICE O/P EST SF 10 MIN: CPT | Performed by: NURSE PRACTITIONER

## 2023-03-28 RX ORDER — CLOPIDOGREL BISULFATE 75 MG/1
75 TABLET ORAL DAILY
Qty: 90 TABLET | Refills: 0 | Status: SHIPPED | OUTPATIENT
Start: 2023-03-28

## 2023-03-28 NOTE — PROGRESS NOTES
Ariana Martinez  1960  63 y.o. female  PCP: Dolly Hunter Jan 2023  BS: 104 per pt     3/28/2023    Chief Complaint   Patient presents with   • Right Foot - Follow-up, Wound Check, Corneal Ulcer       History of Present Illness    Ariana Martinez is a 63 y.o.female presents to the clinic today for a follow up on a right 5th toe corn.       Past Medical History:   Diagnosis Date   • Acute bacterial endocarditis 3/13/2021   • Angina, class IV ([facility])    • Anxiety    • Anxiety and depression    • Arthritis    • Benign paroxysmal positional vertigo    • Bladder disorder     has bladder stimulator   • Carpal tunnel syndrome    • CHF (congestive heart failure) ([facility])    • Chronic pain    • Coronary atherosclerosis     hx CABG 2005.  is followed by Dr Kwon   • Depression    • Diabetes mellitus ([facility])     Type 2, controlled   • Diabetic polyneuropathy ([facility])    • Disease of thyroid gland    • Elevated cholesterol    • Female stress incontinence    • Foot pain, left    • Full dentures    • Gastroparesis    • GERD (gastroesophageal reflux disease)    • Hyperlipidemia    • Hypertension    • Low back pain    • Malaise and fatigue    • Multiple joint pain    • Obesity     Refuses to be weighed   • Occlusion and stenosis of bilateral carotid arteries 6/18/2021   • Otalgia     Both   • Perforation of tympanic membrane     Left   • Postoperative wound infection    • Vitamin D deficiency    • Wears glasses     reading         Past Surgical History:   Procedure Laterality Date   • ABDOMINAL SURGERY     • AMPUTATION FOOT     • ANGIOPLASTY      coronary   • ANKLE ARTHROSCOPY Left 02/26/2021    Procedure: Left foot hardware removal, ankle arthroscopy, bone grafting , left foot exostectomy;  Surgeon: Ignacio Lord DPM;  Location: [facility];  Service: Podiatry;  Laterality: Left;   • BREAST BIOPSY Right    • CARDIAC CATHETERIZATION     • CARDIAC CATHETERIZATION N/A 06/20/2017    Procedure: Right Heart Cath;  Surgeon: Can Quinones  MD Cher PhD;  Location: [facility] CATH INVASIVE LOCATION;  Service:    • CARDIAC CATHETERIZATION N/A 02/18/2020    Procedure: Left Heart Cath;  Surgeon: Catalina Cooper MD;  Location: [facility] CATH INVASIVE LOCATION;  Service: Cardiology;  Laterality: N/A;   • CARDIAC CATHETERIZATION N/A 04/06/2022    Procedure: Left Heart Cath;  Surgeon: Sheryl Navas MD;  Location: [facility] CATH INVASIVE LOCATION;  Service: Cardiology;  Laterality: N/A;   • CARPAL TUNNEL RELEASE     • CHOLECYSTECTOMY     • COLONOSCOPY N/A 06/24/2020    Procedure: COLONOSCOPY;  Surgeon: Julián Maldonado MD;  Location: [facility] ENDOSCOPY;  Service: Gastroenterology;  Laterality: N/A;   • CORONARY ARTERY BYPASS GRAFT  2005   • ENDOSCOPY N/A 10/19/2018    Procedure: ESOPHAGOGASTRODUODENOSCOPY possible dilation;  Surgeon: Julián Maldonado MD;  Location: [facility] ENDOSCOPY;  Service: Gastroenterology   • ENDOSCOPY N/A 06/24/2020    Procedure: ESOPHAGOGASTRODUODENOSCOPY WED appt please;  Surgeon: Julián Maldonado MD;  Location: [facility] ENDOSCOPY;  Service: Gastroenterology;  Laterality: N/A;   • ENDOSCOPY N/A 06/10/2022    Procedure: ESOPHAGOGASTRODUODENOSCOPY   room 380;  Surgeon: Jeremiah Wilkins MD;  Location: [facility] ENDOSCOPY;  Service: Gastroenterology;  Laterality: N/A;   • ENDOSCOPY N/A 1/10/2023    Procedure: ESOPHAGOGASTRODUODENOSCOPY;  Surgeon: Jeremiah Wilkins MD;  Location: [facility] ENDOSCOPY;  Service: Gastroenterology;  Laterality: N/A;   • ENDOSCOPY AND COLONOSCOPY     • FOOT SURGERY      Toes   • FOOT SURGERY     • GASTRIC BANDING      Revision, laparoscopic   • HYSTERECTOMY     • INCISION AND DRAINAGE LEG Left 03/12/2021    Procedure: Left ankle arthroscopic irrigation and debridement, screw removal;  Surgeon: Ignacio Lord DPM;  Location: [facility] OR;  Service: Podiatry;  Laterality: Left;   • MOUTH SURGERY     • SALPINGO OOPHORECTOMY     • SHOULDER SURGERY     • SUBTALAR ARTHRODESIS Left 01/16/2019    Procedure: LEFT FOOT  HARDWARE REMOVAL, FIFTH METATARSAL , OPEN REDUCTION INTERNAL FIXATION, CALCANEAL OSTEOTOMY;  Surgeon: Ignacio Lord DPM;  Location: [facility];  Service: Podiatry   • SUBTALAR ARTHRODESIS Left 10/16/2019    Procedure: foot hardware removal, subtalar joint fusion  possible de/reattachment of achilles tendon        (c-arm);  Surgeon: Ignacio Lord DPM;  Location: [facility] OR;  Service: Podiatry   • SUBTALAR ARTHRODESIS Left 09/30/2020    Procedure: subtalar, talonavicular joint arthrodesis.  Removal hardware.          (c-arm);  Surgeon: Ignacio Lord DPM;  Location: [facility];  Service: Podiatry;  Laterality: Left;   • TRANSESOPHAGEAL ECHOCARDIOGRAM (LAMONTE)      With color flow         Family History   Problem Relation Age of Onset   • Diabetes Other    • Heart disease Other    • Hypertension Other    • Heart disease Mother    • Stroke Mother    • Hypertension Mother    • Diabetes Sister    • Heart disease Sister    • Hypertension Sister    • Heart disease Sister    • Diabetes Sister    • Hypertension Sister    • Diabetes Sister    • Diabetes Sister    • Diabetes Sister    • Diabetes Sister        Allergies   Allergen Reactions   • Seroquel [Quetiapine] Anaphylaxis   • Avandia [Rosiglitazone] Swelling   • Morphine And Related Hallucinations   • Oxycodone Hallucinations       Social History     Socioeconomic History   • Marital status:    Tobacco Use   • Smoking status: Never   • Smokeless tobacco: Never   Vaping Use   • Vaping Use: Never used   Substance and Sexual Activity   • Alcohol use: No   • Drug use: No   • Sexual activity: Defer         Current Outpatient Medications   Medication Sig Dispense Refill   • amLODIPine (NORVASC) 5 MG tablet Take 1 tablet by mouth Daily. 90 tablet 0   • ARIPiprazole (ABILIFY) 5 MG tablet Take 1 tablet by mouth Daily. 90 tablet 1   • aspirin  MG tablet Take 1 tablet by mouth Daily. 30 tablet 0   • atorvastatin (LIPITOR) 80 MG tablet Take 1 tablet by mouth  Daily. 90 tablet 1   • B-D ULTRAFINE III SHORT PEN 31G X 8 MM misc USE TO INJECT 5 TIMES A  each 11   • B-D ULTRAFINE III SHORT PEN 31G X 8 MM misc USE UP TO 4 (FOUR) TIMES DAILY WITH INSULIN AS DIRECTED. 200 each 1   • BD SHARPS CONTAINER HOME misc 1 each Take As Directed. 1 each 0   • Blood Glucose Monitoring Suppl (ONE TOUCH ULTRA MINI) w/Device kit Patient will need the Accu-Check Avitio meter 1 each 0   • butalbital-acetaminophen-caffeine (FIORICET, ESGIC) -40 MG per tablet Take one to two tablets by mouth per headache episode as needed. 6 tablet 0   • Calcium Citrate-Vitamin D 250-200 MG-UNIT tablet Take 2 tablets by mouth 2 (two) times a day.     • clopidogrel (PLAVIX) 75 MG tablet Take 1 tablet by mouth Daily. 90 tablet 0   • [employer] Diclofenac Sodium 1 % gel gel APPLY 4 G TOPICALLY TO THE APPROPRIATE AREA AS DIRECTED 2 (TWO) TIMES A DAY. SMALL AMOUNT TO AFFECTED AREA 100 g 0   • cyanocobalamin 1000 MCG/ML injection INJECT 1 ML INTO THE APPROPRIATE MUSCLE AS DIRECTED BY PRESCRIBER EVERY 28 (TWENTY-EIGHT) DAYS. 1 mL 2   • fluticasone (FLONASE) 50 MCG/ACT nasal spray INSTILL 2 SPRAYS IN EACH NOSTRIL AS DIRECTED BY PROVIDER DAILY 16 mL 5   • folic acid (FOLVITE) 1 MG tablet TAKE 1 TABLET BY MOUTH EVERY DAY 90 tablet 1   • furosemide (LASIX) 40 MG tablet TAKE 1 TABLET BY MOUTH EVERY DAY 30 tablet 5   • gabapentin (NEURONTIN) 100 MG capsule TAKE 1 CAPSULE BY MOUTH EVERY 12 HOURS 60 capsule 0   • glucose blood (Accu-Chek Guide) test strip 1 each by Other route 4 (Four) Times a Day. To test blood sugar 4X daily. For  Dx E11.65 400 each 1   • glucose monitor monitoring kit 1 each Daily. accucheck eve meter, E11.9 1 each 0   • hydroCHLOROthiazide (HYDRODIURIL) 12.5 MG tablet TAKE 1 TABLET BY MOUTH EVERY DAY 30 tablet 2   • HYDROcodone-acetaminophen (NORCO) 7.5-325 MG per tablet Take 1 tablet by mouth Every 6 (Six) Hours As Needed for Moderate Pain. Pls fill on 3/10 120 tablet 0   • insulin aspart (NovoLOG  "FlexPen) 100 UNIT/ML solution pen-injector sc pen INJECT 30 UNITS UNDER SKIN AS DIRECTED 3 TIMES A DAY WITH MEALS 30 mL 11   • Insulin Glargine (BASAGLAR KWIKPEN) 100 UNIT/ML injection pen Inject 40 units into the appropriate area every morning and 35 units into the appropriate area every night 30 mL 4   • isosorbide mononitrate (IMDUR) 30 MG 24 hr tablet TAKE 1 TABLET BY MOUTH EVERY DAY 30 tablet 3   • Lancets (accu-chek soft touch) lancets As directed 100 each 12   • levothyroxine (SYNTHROID, LEVOTHROID) 25 MCG tablet Take 1 tablet by mouth Daily. 30 tablet 11   • meclizine (ANTIVERT) 25 MG tablet Take 1 tablet by mouth 3 (Three) Times a Day As Needed for dizziness. 90 tablet 6   • meloxicam (MOBIC) 15 MG tablet TAKE 1 TABLET BY MOUTH EVERY DAY WITH FOOD 30 tablet 3   • methocarbamol (ROBAXIN) 500 MG tablet TAKE 1 TABLET BY MOUTH 2 TIMES A DAY AS NEEDED FOR MUSCLE SPASMS. 60 tablet 5   • metoclopramide (REGLAN) 10 MG tablet TAKE 1 TABLET BY MOUTH 4 (FOUR) TIMES A DAY AS NEEDED (NAUSEA). 120 tablet 0   • metoclopramide (REGLAN) 5 MG tablet Take 1 tablet by mouth 3 (Three) Times a Day As Needed (vomiting). 12 tablet 0   • metoprolol succinate XL (TOPROL-XL) 50 MG 24 hr tablet Take 1 tablet by mouth Daily. Dose increased 5/24/21 90 tablet 1   • naloxone (NARCAN) 0.4 MG/ML injection Infuse 0.5 mL into a venous catheter Every 5 (Five) Minutes As Needed for Opioid Reversal.     • Needle, Disp, (Easy Touch FlipLock Needles) 25G X 1-1/2\" misc 1 each Every 28 (Twenty-Eight) Days. For administering B12 cyanocobalomin. Dx vitamin B12 deficiency. 10 each 3   • Needle, Disp, (Hypodermic Needle) 20G X 1\" misc 1 each Every 28 (Twenty-Eight) Days. For drawing up B12 for injection monthly, change back to smaller gauge needle prior to injection. Dx Vitamin B12  Deficiency. 10 each 2   • nitroglycerin (NITROSTAT) 0.4 MG SL tablet Place 1 tablet under the tongue Every 5 (Five) Minutes As Needed for Chest Pain. Take no more than 3 " "doses in 15 minutes. 20 tablet 11   • ondansetron (ZOFRAN) 8 MG tablet Take 1 tablet by mouth Every 8 (Eight) Hours As Needed for Nausea or Vomiting. 18 tablet 0   • ondansetron ODT (ZOFRAN-ODT) 4 MG disintegrating tablet Place 1 tablet on the tongue 4 (Four) Times a Day As Needed for Nausea or Vomiting. 12 tablet 0   • pantoprazole (PROTONIX) 20 MG EC tablet Take 2 tablets by mouth Daily. 90 tablet 0   • predniSONE (DELTASONE) 5 MG tablet Take twice daily for first 5 days then once daily until gone. 15 tablet 0   • ranolazine (RANEXA) 500 MG 12 hr tablet Take 1 tablet by mouth Every 12 (Twelve) Hours. 180 tablet 3   • sucralfate (Carafate) 1 g tablet Take 1 tablet by mouth 4 (Four) Times a Day. 40 tablet 0   • Syringe 20G X 1-1/2\" 3 ML misc 1 each Every 28 (Twenty-Eight) Days. For injection cyanocobalomin, Dx vit B12 deficiency. 10 each 2   • venlafaxine XR (EFFEXOR-XR) 75 MG 24 hr capsule Take 1 capsule by mouth Daily With Breakfast. 90 capsule 1   • vitamin D (ERGOCALCIFEROL) 1.25 MG (17604 UT) capsule capsule TAKE 1 CAPSULE BY MOUTH EVERY 7 DAYS. 36 capsule 0     No current facility-administered medications for this visit.       Review of Systems   Constitutional: Negative.    HENT: Negative.    Eyes: Negative.    Respiratory: Negative.    Cardiovascular: Negative.    Gastrointestinal: Negative.    Endocrine: Negative.    Genitourinary: Negative.    Musculoskeletal:        Foot pain   Skin: Negative.    Allergic/Immunologic: Negative.    Neurological: Negative.    Hematological: Negative.    Psychiatric/Behavioral: Negative.          OBJECTIVE    Pulse 64   Ht 172.7 cm (67.99\")   Wt 116 kg (255 lb)   SpO2 95%   BMI 38.78 kg/m²     Physical Exam  Vitals reviewed.   Constitutional:       Appearance: Normal appearance. She is well-developed.   HENT:      Head: Normocephalic and atraumatic.   Neck:      Trachea: Trachea and phonation normal.   Cardiovascular:      Pulses:           Dorsalis pedis pulses are 1+ " on the right side and 1+ on the left side.        Posterior tibial pulses are 1+ on the right side and 1+ on the left side.   Pulmonary:      Effort: Pulmonary effort is normal. No respiratory distress.   Abdominal:      General: There is no distension.      Palpations: Abdomen is soft.   Musculoskeletal:      Right foot: Normal range of motion.      Left foot: Normal range of motion.        Feet:    Feet:      Right foot:      Toenail Condition: Right toenails are normal.      Left foot:      Skin integrity: Skin integrity normal.      Toenail Condition: Left toenails are normal.   Skin:     General: Skin is warm and dry.   Neurological:      Mental Status: She is alert and oriented to person, place, and time.      GCS: GCS eye subscore is 4. GCS verbal subscore is 5. GCS motor subscore is 6.   Psychiatric:         Speech: Speech normal.         Behavior: Behavior normal. Behavior is cooperative.         Thought Content: Thought content normal.         Judgment: Judgment normal.                Procedures        ASSESSMENT AND PLAN    Diagnoses and all orders for this visit:    1. Left foot pain (Primary)    2. Corn          Continue to monitor area, [place] to the [facility] follow-up in 1 month or as needed for worsening symptoms        This document has been electronically signed by Panda SPENCE, FNP-C, ONP-C on March 28, 2023 09:01 CDT

## 2023-04-07 DIAGNOSIS — E53.8 CYANOCOBALAMIN DEFICIENCY: ICD-10-CM

## 2023-04-07 DIAGNOSIS — G89.29 CHRONIC RIGHT-SIDED LOW BACK PAIN WITH RIGHT-SIDED SCIATICA: ICD-10-CM

## 2023-04-07 DIAGNOSIS — G54.6 PHANTOM PAIN AFTER AMPUTATION OF LOWER EXTREMITY: Chronic | ICD-10-CM

## 2023-04-07 DIAGNOSIS — M54.41 CHRONIC RIGHT-SIDED LOW BACK PAIN WITH RIGHT-SIDED SCIATICA: ICD-10-CM

## 2023-04-07 RX ORDER — CYANOCOBALAMIN 1000 UG/ML
1000 INJECTION, SOLUTION INTRAMUSCULAR; SUBCUTANEOUS
Qty: 3 ML | Refills: 0 | Status: SHIPPED | OUTPATIENT
Start: 2023-04-07

## 2023-04-07 RX ORDER — HYDROCODONE BITARTRATE AND ACETAMINOPHEN 7.5; 325 MG/1; MG/1
1 TABLET ORAL EVERY 6 HOURS PRN
Qty: 120 TABLET | Refills: 0 | Status: SHIPPED | OUTPATIENT
Start: 2023-04-07

## 2023-04-10 DIAGNOSIS — Z44.9: Primary | ICD-10-CM

## 2023-04-11 DIAGNOSIS — I25.118 CORONARY ARTERY DISEASE OF NATIVE ARTERY OF NATIVE HEART WITH STABLE ANGINA PECTORIS: Chronic | ICD-10-CM

## 2023-04-11 RX ORDER — ISOSORBIDE MONONITRATE 30 MG/1
TABLET, EXTENDED RELEASE ORAL
Qty: 90 TABLET | Refills: 0 | Status: SHIPPED | OUTPATIENT
Start: 2023-04-11

## 2023-04-16 DIAGNOSIS — E11.43 TYPE 2 DIABETES MELLITUS WITH DIABETIC AUTONOMIC NEUROPATHY, WITH LONG-TERM CURRENT USE OF INSULIN: Chronic | ICD-10-CM

## 2023-04-16 DIAGNOSIS — G62.9 NEUROPATHY: Chronic | ICD-10-CM

## 2023-04-16 DIAGNOSIS — Z79.4 TYPE 2 DIABETES MELLITUS WITH DIABETIC AUTONOMIC NEUROPATHY, WITH LONG-TERM CURRENT USE OF INSULIN: Chronic | ICD-10-CM

## 2023-04-17 RX ORDER — GABAPENTIN 100 MG/1
CAPSULE ORAL
Qty: 60 CAPSULE | Refills: 0 | Status: SHIPPED | OUTPATIENT
Start: 2023-04-17

## 2023-04-18 ENCOUNTER — OFFICE VISIT (OUTPATIENT)
Dept: CARDIOLOGY | Facility: CLINIC | Age: 63
End: 2023-04-18
Payer: COMMERCIAL

## 2023-04-18 VITALS
DIASTOLIC BLOOD PRESSURE: 76 MMHG | SYSTOLIC BLOOD PRESSURE: 140 MMHG | HEIGHT: 68 IN | TEMPERATURE: 97.3 F | WEIGHT: 255 LBS | OXYGEN SATURATION: 99 % | BODY MASS INDEX: 38.65 KG/M2 | HEART RATE: 80 BPM

## 2023-04-18 DIAGNOSIS — I10 PRIMARY HYPERTENSION: Chronic | ICD-10-CM

## 2023-04-18 DIAGNOSIS — Z95.1 S/P CABG (CORONARY ARTERY BYPASS GRAFT): Primary | Chronic | ICD-10-CM

## 2023-04-18 DIAGNOSIS — Z79.4 TYPE 2 DIABETES MELLITUS WITH HYPERGLYCEMIA, WITH LONG-TERM CURRENT USE OF INSULIN: Chronic | ICD-10-CM

## 2023-04-18 DIAGNOSIS — E78.2 MIXED HYPERLIPIDEMIA: Chronic | ICD-10-CM

## 2023-04-18 DIAGNOSIS — I50.32 CHRONIC HEART FAILURE WITH PRESERVED EJECTION FRACTION: Chronic | ICD-10-CM

## 2023-04-18 DIAGNOSIS — E11.65 TYPE 2 DIABETES MELLITUS WITH HYPERGLYCEMIA, WITH LONG-TERM CURRENT USE OF INSULIN: Chronic | ICD-10-CM

## 2023-04-18 DIAGNOSIS — E66.01 CLASS 3 SEVERE OBESITY DUE TO EXCESS CALORIES WITHOUT SERIOUS COMORBIDITY WITH BODY MASS INDEX (BMI) OF 40.0 TO 44.9 IN ADULT: Chronic | ICD-10-CM

## 2023-04-18 RX ORDER — FAMOTIDINE 40 MG/1
1 TABLET, FILM COATED ORAL DAILY
COMMUNITY
Start: 2023-04-14

## 2023-04-18 RX ORDER — FOLIC ACID 1 MG/1
TABLET ORAL
Qty: 90 TABLET | Refills: 1 | Status: SHIPPED | OUTPATIENT
Start: 2023-04-18

## 2023-04-18 NOTE — PROGRESS NOTES
Ariana Martinez  63 y.o. female      1. S/P CABG (coronary artery bypass graft)    2. Type 2 diabetes mellitus with hyperglycemia, with long-term current use of insulin ([facility])    3. Mixed hyperlipidemia    4. Primary hypertension    5. Class 3 severe obesity due to excess calories without serious comorbidity with body mass index (BMI) of 40.0 to 44.9 in adult ([facility])    6. Chronic heart failure with preserved ejection fraction        History of Present Illness  Ariana Martinez is a 63-year-old  lady with a complex medical history which is remarkable for coronary artery disease status post coronary artery bypass surgery in 2005, insulin-dependent type 2 diabetes mellitus with complications including gastroparesis, hypertension, anxiety/depression, hyperlipidemia, GERD, HFpEF,  vitamin D deficiency. She was treated for left foot pain and found to have hardware infection with MSSA and Acinetobacter.  She was treated for infective endocarditis of the mitral valve.  She was also noted to have a pneumonia on CTA at [facility] in March 2021.    She was admitted to [facility] for evaluation of chest pain in May 2021.  Cardiac enzymes are negative for myocardial injury and EKG showed no acute ST-T wave changes.  Her blood pressure was noted to be markedly elevated.  D-dimer was elevated and CT of the chest was negative for PE.    Lexiscan Cardiolite stress test showed:  · EKG findings equivocal secondary to baseline artifacts during Lexiscan infusion  · Small apical fixed defect noted. No significant reversible ischemia noted. Perfusion in the septum, anterior wall, lateral wall and inferior wall appear normal  · Ejection fraction 60%. No wall motion abnormalities  · Low risk study  Echocardiogram showed:  · The study is technically difficult for diagnosis. The quality of the study is limited due to patient body habitus and lung disease.  · Estimated left ventricular EF = 67% Left ventricular ejection  fraction appears to be 66 - 70%. Left ventricular systolic function is normal.  · Left ventricular diastolic function is consistent with (grade I) impaired relaxation.  · Estimated right ventricular systolic pressure from tricuspid regurgitation is normal (<35 mmHg).      Her last cardiac catheterization was in February 2020 by Dr. Cooper which showed:  This patient who had a LIMA bypass in 2005 that is nonfunctioning.  The LAD is total from the midportion on.  The only flow given to the LAD region which also would include the apex in the inferoapical portion on a wraparound LAD is in the proximal LAD going into the diagonal branch.  This is been successfully stented with drug-eluting stents x2.  She has flow into the circumflex and right coronary artery and now except for the mid LAD portion is completely revascularized once again.     She denied any chest pain or shortness of breath at this time and has been compliant with her medications.  Her heart rate and blood pressure were in the normal range.  No signs of congestive heart failure was noted.    Allergies   Allergen Reactions   • Seroquel [Quetiapine] Anaphylaxis   • Avandia [Rosiglitazone] Swelling   • Morphine And Related Hallucinations   • Oxycodone Hallucinations         Past Medical History:   Diagnosis Date   • Acute bacterial endocarditis 3/13/2021   • Angina, class IV    • Anxiety    • Anxiety and depression    • Arthritis    • Benign paroxysmal positional vertigo    • Bladder disorder     has bladder stimulator   • Carpal tunnel syndrome    • CHF (congestive heart failure)    • Chronic pain    • Coronary atherosclerosis     hx CABG 2005.  is followed by Dr Kwon   • Depression    • Diabetes mellitus     Type 2, controlled   • Diabetic polyneuropathy    • Disease of thyroid gland    • Elevated cholesterol    • Female stress incontinence    • Foot pain, left    • Full dentures    • Gastroparesis    • GERD (gastroesophageal reflux disease)    •  Hyperlipidemia    • Hypertension    • Low back pain    • Malaise and fatigue    • Multiple joint pain    • Obesity     Refuses to be weighed   • Occlusion and stenosis of bilateral carotid arteries 6/18/2021   • Otalgia     Both   • Perforation of tympanic membrane     Left   • Postoperative wound infection    • Vitamin D deficiency    • Wears glasses     reading         Past Surgical History:   Procedure Laterality Date   • ABDOMINAL SURGERY     • AMPUTATION FOOT     • ANGIOPLASTY      coronary   • ANKLE ARTHROSCOPY Left 02/26/2021    Procedure: Left foot hardware removal, ankle arthroscopy, bone grafting , left foot exostectomy;  Surgeon: Ignacio Lord DPM;  Location: [facility] OR;  Service: Podiatry;  Laterality: Left;   • BREAST BIOPSY Right    • CARDIAC CATHETERIZATION     • CARDIAC CATHETERIZATION N/A 06/20/2017    Procedure: Right Heart Cath;  Surgeon: Can Kwon MD PhD;  Location: [facility] CATH INVASIVE LOCATION;  Service:    • CARDIAC CATHETERIZATION N/A 02/18/2020    Procedure: Left Heart Cath;  Surgeon: Catalina Cooper MD;  Location: [facility] CATH INVASIVE LOCATION;  Service: Cardiology;  Laterality: N/A;   • CARDIAC CATHETERIZATION N/A 04/06/2022    Procedure: Left Heart Cath;  Surgeon: Sheryl Navas MD;  Location: [facility] CATH INVASIVE LOCATION;  Service: Cardiology;  Laterality: N/A;   • CARPAL TUNNEL RELEASE     • CHOLECYSTECTOMY     • COLONOSCOPY N/A 06/24/2020    Procedure: COLONOSCOPY;  Surgeon: Julián Maldonado MD;  Location: [facility] ENDOSCOPY;  Service: Gastroenterology;  Laterality: N/A;   • CORONARY ARTERY BYPASS GRAFT  2005   • ENDOSCOPY N/A 10/19/2018    Procedure: ESOPHAGOGASTRODUODENOSCOPY possible dilation;  Surgeon: Julián Maldonado MD;  Location: [facility] ENDOSCOPY;  Service: Gastroenterology   • ENDOSCOPY N/A 06/24/2020    Procedure: ESOPHAGOGASTRODUODENOSCOPY WED appt please;  Surgeon: Julián Maldonado MD;  Location: [facility] ENDOSCOPY;  Service: Gastroenterology;   Laterality: N/A;   • ENDOSCOPY N/A 06/10/2022    Procedure: ESOPHAGOGASTRODUODENOSCOPY   room 380;  Surgeon: Jeremiah Wilkins MD;  Location: [facility] ENDOSCOPY;  Service: Gastroenterology;  Laterality: N/A;   • ENDOSCOPY N/A 1/10/2023    Procedure: ESOPHAGOGASTRODUODENOSCOPY;  Surgeon: Jeremiah Wilkins MD;  Location: [facility] ENDOSCOPY;  Service: Gastroenterology;  Laterality: N/A;   • ENDOSCOPY AND COLONOSCOPY     • FOOT SURGERY      Toes   • FOOT SURGERY     • GASTRIC BANDING      Revision, laparoscopic   • HYSTERECTOMY     • INCISION AND DRAINAGE LEG Left 03/12/2021    Procedure: Left ankle arthroscopic irrigation and debridement, screw removal;  Surgeon: Ignacio Lord DPM;  Location: [facility] OR;  Service: Podiatry;  Laterality: Left;   • MOUTH SURGERY     • SALPINGO OOPHORECTOMY     • SHOULDER SURGERY     • SUBTALAR ARTHRODESIS Left 01/16/2019    Procedure: LEFT FOOT HARDWARE REMOVAL, FIFTH METATARSAL , OPEN REDUCTION INTERNAL FIXATION, CALCANEAL OSTEOTOMY;  Surgeon: Ignacio Lord DPM;  Location: [facility] OR;  Service: Podiatry   • SUBTALAR ARTHRODESIS Left 10/16/2019    Procedure: foot hardware removal, subtalar joint fusion  possible de/reattachment of achilles tendon        (c-arm);  Surgeon: Ignacio Lord DPM;  Location: [facility];  Service: Podiatry   • SUBTALAR ARTHRODESIS Left 09/30/2020    Procedure: subtalar, talonavicular joint arthrodesis.  Removal hardware.          (c-arm);  Surgeon: Ignacio Lord DPM;  Location: [facility];  Service: Podiatry;  Laterality: Left;   • TRANSESOPHAGEAL ECHOCARDIOGRAM (LAMONTE)      With color flow         Family History   Problem Relation Age of Onset   • Diabetes Other    • Heart disease Other    • Hypertension Other    • Heart disease Mother    • Stroke Mother    • Hypertension Mother    • Diabetes Sister    • Heart disease Sister    • Hypertension Sister    • Heart disease Sister    • Diabetes Sister    • Hypertension Sister    • Diabetes Sister    •  Diabetes Sister    • Diabetes Sister    • Diabetes Sister          Social History     Socioeconomic History   • Marital status:    Tobacco Use   • Smoking status: Never   • Smokeless tobacco: Never   Vaping Use   • Vaping Use: Never used   Substance and Sexual Activity   • Alcohol use: No   • Drug use: No   • Sexual activity: Defer         Current Outpatient Medications   Medication Sig Dispense Refill   • amLODIPine (NORVASC) 5 MG tablet Take 1 tablet by mouth Daily. 90 tablet 0   • ARIPiprazole (ABILIFY) 5 MG tablet Take 1 tablet by mouth Daily. 90 tablet 1   • aspirin  MG tablet Take 1 tablet by mouth Daily. 30 tablet 0   • atorvastatin (LIPITOR) 80 MG tablet Take 1 tablet by mouth Daily. 90 tablet 1   • B-D ULTRAFINE III SHORT PEN 31G X 8 MM misc USE TO INJECT 5 TIMES A  each 11   • B-D ULTRAFINE III SHORT PEN 31G X 8 MM misc USE UP TO 4 (FOUR) TIMES DAILY WITH INSULIN AS DIRECTED. 200 each 1   • BD SHARPS CONTAINER HOME misc 1 each Take As Directed. 1 each 0   • Blood Glucose Monitoring Suppl (ONE TOUCH ULTRA MINI) w/Device kit Patient will need the Accu-Check Avitio meter 1 each 0   • butalbital-acetaminophen-caffeine (FIORICET, ESGIC) -40 MG per tablet Take one to two tablets by mouth per headache episode as needed. 6 tablet 0   • Calcium Citrate-Vitamin D 250-200 MG-UNIT tablet Take 2 tablets by mouth 2 (two) times a day.     • clopidogrel (PLAVIX) 75 MG tablet Take 1 tablet by mouth Daily. 90 tablet 0   • [employer] Diclofenac Sodium 1 % gel gel APPLY 4 G TOPICALLY TO THE APPROPRIATE AREA AS DIRECTED 2 (TWO) TIMES A DAY. SMALL AMOUNT TO AFFECTED AREA 100 g 0   • cyanocobalamin 1000 MCG/ML injection INJECT 1 ML INTO THE APPROPRIATE MUSCLE AS DIRECTED BY PRESCRIBER EVERY 28 (TWENTY-EIGHT) DAYS. 3 mL 0   • famotidine (PEPCID) 40 MG tablet Take 1 tablet by mouth Daily.     • fluticasone (FLONASE) 50 MCG/ACT nasal spray INSTILL 2 SPRAYS IN EACH NOSTRIL AS DIRECTED BY PROVIDER DAILY 16 mL 5   •  folic acid (FOLVITE) 1 MG tablet TAKE 1 TABLET BY MOUTH EVERY DAY 90 tablet 1   • furosemide (LASIX) 40 MG tablet TAKE 1 TABLET BY MOUTH EVERY DAY 30 tablet 5   • gabapentin (NEURONTIN) 100 MG capsule TAKE 1 CAPSULE BY MOUTH EVERY 12 HOURS 60 capsule 0   • glucose blood (Accu-Chek Guide) test strip 1 each by Other route 4 (Four) Times a Day. To test blood sugar 4X daily. For  Dx E11.65 400 each 1   • glucose monitor monitoring kit 1 each Daily. accucheck eve meter, E11.9 1 each 0   • hydroCHLOROthiazide (HYDRODIURIL) 12.5 MG tablet TAKE 1 TABLET BY MOUTH EVERY DAY 30 tablet 2   • HYDROcodone-acetaminophen (NORCO) 7.5-325 MG per tablet Take 1 tablet by mouth Every 6 (Six) Hours As Needed for Moderate Pain. Pls fill on 3/10 120 tablet 0   • insulin aspart (NovoLOG FlexPen) 100 UNIT/ML solution pen-injector sc pen INJECT 30 UNITS UNDER SKIN AS DIRECTED 3 TIMES A DAY WITH MEALS 30 mL 11   • Insulin Glargine (BASAGLAR KWIKPEN) 100 UNIT/ML injection pen Inject 40 units into the appropriate area every morning and 35 units into the appropriate area every night 30 mL 4   • isosorbide mononitrate (IMDUR) 30 MG 24 hr tablet TAKE 1 TABLET BY MOUTH EVERY DAY 90 tablet 0   • Lancets (accu-chek soft touch) lancets As directed 100 each 12   • levothyroxine (SYNTHROID, LEVOTHROID) 25 MCG tablet Take 1 tablet by mouth Daily. 30 tablet 11   • meclizine (ANTIVERT) 25 MG tablet Take 1 tablet by mouth 3 (Three) Times a Day As Needed for dizziness. 90 tablet 6   • meloxicam (MOBIC) 15 MG tablet TAKE 1 TABLET BY MOUTH EVERY DAY WITH FOOD 30 tablet 3   • methocarbamol (ROBAXIN) 500 MG tablet TAKE 1 TABLET BY MOUTH 2 TIMES A DAY AS NEEDED FOR MUSCLE SPASMS. 60 tablet 5   • metoclopramide (REGLAN) 10 MG tablet TAKE 1 TABLET BY MOUTH 4 (FOUR) TIMES A DAY AS NEEDED (NAUSEA). 120 tablet 0   • metoclopramide (REGLAN) 5 MG tablet Take 1 tablet by mouth 3 (Three) Times a Day As Needed (vomiting). 12 tablet 0   • metoprolol succinate XL  "(TOPROL-XL) 50 MG 24 hr tablet Take 1 tablet by mouth Daily. Dose increased 5/24/21 90 tablet 1   • naloxone (NARCAN) 0.4 MG/ML injection Infuse 0.5 mL into a venous catheter Every 5 (Five) Minutes As Needed for Opioid Reversal.     • Needle, Disp, (Easy Touch FlipLock Needles) 25G X 1-1/2\" misc 1 each Every 28 (Twenty-Eight) Days. For administering B12 cyanocobalomin. Dx vitamin B12 deficiency. 10 each 3   • Needle, Disp, (Hypodermic Needle) 20G X 1\" misc 1 each Every 28 (Twenty-Eight) Days. For drawing up B12 for injection monthly, change back to smaller gauge needle prior to injection. Dx Vitamin B12  Deficiency. 10 each 2   • nitroglycerin (NITROSTAT) 0.4 MG SL tablet Place 1 tablet under the tongue Every 5 (Five) Minutes As Needed for Chest Pain. Take no more than 3 doses in 15 minutes. 20 tablet 11   • ondansetron (ZOFRAN) 8 MG tablet Take 1 tablet by mouth Every 8 (Eight) Hours As Needed for Nausea or Vomiting. 18 tablet 0   • ondansetron ODT (ZOFRAN-ODT) 4 MG disintegrating tablet Place 1 tablet on the tongue 4 (Four) Times a Day As Needed for Nausea or Vomiting. 12 tablet 0   • pantoprazole (PROTONIX) 20 MG EC tablet Take 2 tablets by mouth Daily. 90 tablet 0   • predniSONE (DELTASONE) 5 MG tablet Take twice daily for first 5 days then once daily until gone. 15 tablet 0   • ranolazine (RANEXA) 500 MG 12 hr tablet Take 1 tablet by mouth Every 12 (Twelve) Hours. 180 tablet 3   • sucralfate (Carafate) 1 g tablet Take 1 tablet by mouth 4 (Four) Times a Day. 40 tablet 0   • Syringe 20G X 1-1/2\" 3 ML misc 1 each Every 28 (Twenty-Eight) Days. For injection cyanocobalomin, Dx vit B12 deficiency. 10 each 2   • venlafaxine XR (EFFEXOR-XR) 75 MG 24 hr capsule Take 1 capsule by mouth Daily With Breakfast. 90 capsule 1   • vitamin D (ERGOCALCIFEROL) 1.25 MG (10071 UT) capsule capsule TAKE 1 CAPSULE BY MOUTH EVERY 7 DAYS. 36 capsule 0     No current facility-administered medications for this visit. " "        OBJECTIVE    /76 (BP Location: Left arm, Patient Position: Sitting, Cuff Size: Adult)   Pulse 80   Temp 97.3 °F (36.3 °C)   Ht 172.7 cm (68\")   Wt 116 kg (255 lb)   SpO2 99%   BMI 38.77 kg/m²         Review of Systems: The following systems are reviewed and changes noted as indicated below    Constitutional:  Denies recent weight loss, weight gain, fever or chills     HENT:  Denies any hearing loss, epistaxis, hoarseness, or difficulty speaking.     Eyes: Wears eyeglasses or contact lenses     Respiratory:  Denies dyspnea with exertion,no cough, wheezing, or hemoptysis.     Cardiovascular: Negative for palpations, chest pain, orthopnea, PND.  See HPI    Gastrointestinal:  Denies change in bowel habits, dyspepsia, ulcer disease, hematochezia, or melena.     Endocrine: Negative for cold intolerance, heat intolerance, polydipsia, polyphagia and polyuria.     Genitourinary: Negative.      Musculoskeletal: BKA left.  Edema legs    Neurological:  Denies any history of recurrent headaches, strokes, TIA, or seizure disorder.     Hematological: Denies any food allergies, seasonal allergies, bleeding disorders, or lymphadenopathy.       Physical Exam: The following systems are reviewed and no changes noted    Constitutional: Cooperative, alert and oriented,  in no acute distress.     HENT:   Head: Normocephalic, normal hair patterns, no masses or tenderness.  Ears, Nose, and Throat: No gross abnormalities. No pallor or cyanosis. Dentition good.   Eyes: EOMS intact, PERRL, conjunctivae and lids unremarkable. Fundoscopic exam and visual fields not performed.   Neck: No palpable masses or adenopathy, no thyromegaly, no JVD, carotid pulses are full and equal bilaterally and without  Bruits.     Cardiovascular: Regular rhythm, S1 and S2 normal, no S3 or S4.  No murmurs, gallops, or rubs detected.     Pulmonary/Chest: Chest: normal symmetry, normal respiratory excursion, no intercostal retraction, no use of " accessory muscles.            Pulmonary: Normal breath sounds. No rales or ronchi.    Abdominal: Abdomen soft, bowel sounds normoactive, no masses, no hepatosplenomegaly, non-tender, no bruits.     Musculoskeletal: No deformities, clubbing, cyanosis, erythema.  Left BKA.     Neurological: No gross motor or sensory deficits noted, affect appropriate, oriented to time, person, place.     Skin: Warm and dry to the touch, no apparent skin lesions or masses noted.     Psychiatric: She has a normal mood and affect. Her behavior is normal. Judgment and thought content normal.         Procedures      Lab Results   Component Value Date    WBC 7.24 01/09/2023    HGB 13.5 01/09/2023    HCT 41.1 01/09/2023    MCV 87.4 01/09/2023     01/09/2023     Lab Results   Component Value Date    GLUCOSE 106 (H) 01/09/2023    BUN 10 01/09/2023    CREATININE 0.99 01/09/2023    EGFRIFNONA 45 (L) 02/07/2022    BCR 10.1 01/09/2023    CO2 29.0 01/09/2023    CALCIUM 9.1 01/09/2023    ALBUMIN 3.9 03/10/2023    AST 19 03/10/2023    ALT 18 03/10/2023     Lab Results   Component Value Date    CHOL 164 09/29/2022    CHOL 169 06/06/2022    CHOL 154 06/01/2022     Lab Results   Component Value Date    TRIG 88 09/29/2022    TRIG 227 (H) 06/06/2022    TRIG 288 (H) 06/01/2022     Lab Results   Component Value Date    HDL 52 09/29/2022    HDL 41 06/06/2022    HDL 35 (L) 06/01/2022     No components found for: LDLCALC  Lab Results   Component Value Date    LDL 96 09/29/2022    LDL 90 06/06/2022    LDL 72 06/01/2022     No results found for: HDLLDLRATIO  No components found for: CHOLHDL  Lab Results   Component Value Date    HGBA1C 9.50 (H) 09/29/2022     Lab Results   Component Value Date    TSH 7.900 (H) 09/29/2022    [id number] 9.70 08/30/2017    THYROIDAB <6 01/02/2015           ASSESSMENT AND PLAN  Ariana Martinez is a 63-year-old lady with a complex medical history as discussed in detail under history of present illness.  She denies any cardiac  symptoms at the present time and no signs of arrhythmia or congestive heart failure noted.     She has been using compression stockings and this has helped with edema in the lower extremities.  Her lab results from January 2023 were reviewed and CBC and CMP were within acceptable normal limits.  LDL was 96 and HDL 52 in September 2022.     I have continued antiplatelet therapy with aspirin and Plavix, lipid-lowering therapy with atorvastatin, antihypertensive therapy with metoprolol succinate, amlodipine, lisinopril, HCTZ.  She is on Lasix 40 mg daily.      Diagnoses and all orders for this visit:    1. S/P CABG (coronary artery bypass graft) (Primary)    2. Type 2 diabetes mellitus with hyperglycemia, with long-term current use of insulin (HCC)    3. Mixed hyperlipidemia    4. Primary hypertension    5. Class 3 severe obesity due to excess calories without serious comorbidity with body mass index (BMI) of 40.0 to 44.9 in adult ([facility])    6. Chronic heart failure with preserved ejection fraction        Patient's Body mass index is 38.77 kg/m². indicating that she is obese (BMI >30). Obesity-related health conditions include the following: hypertension, coronary heart disease, diabetes mellitus and dyslipidemias. Obesity is unchanged. BMI is is above average; no BMI management plan is appropriate. We discussed portion control and increasing exercise..      Ariana Martinez  reports that she has never smoked. She has never used smokeless tobacco..          Bill Gibson MD  4/18/2023  11:55 CDT

## 2023-04-18 NOTE — TELEPHONE ENCOUNTER
Rx Refill Note  Requested Prescriptions     Pending Prescriptions Disp Refills   • folic acid (FOLVITE) 1 MG tablet [Pharmacy Med Name: FOLIC ACID 1 MG TABLET] 90 tablet 1     Sig: TAKE 1 TABLET BY MOUTH EVERY DAY      Last office visit with prescribing clinician: 12/20/2022   Last telemedicine visit with prescribing clinician: 6/20/2023   Next office visit with prescribing clinician: 6/20/2023                         Would you like a call back once the refill request has been completed: [] Yes [] No    If the office needs to give you a call back, can they leave a voicemail: [] Yes [] No    Chandana Hoover Rep  04/18/23, 07:49 CDT

## 2023-04-21 DIAGNOSIS — R51.9 ACUTE NONINTRACTABLE HEADACHE, UNSPECIFIED HEADACHE TYPE: ICD-10-CM

## 2023-04-21 RX ORDER — BUTALBITAL, ACETAMINOPHEN AND CAFFEINE 50; 325; 40 MG/1; MG/1; MG/1
TABLET ORAL
Qty: 6 TABLET | Refills: 0 | Status: SHIPPED | OUTPATIENT
Start: 2023-04-21

## 2023-04-21 NOTE — TELEPHONE ENCOUNTER
butalbital-acetaminophen-caffeine (FIORICET, ESGIC) 50    [facility]/pharmacy #[id number] - [place], [place] - [address] [place] [address] - [phone]  - [phone] [address] [address]   Phone:  [phone]  Fax:  [phone]

## 2023-04-21 NOTE — TELEPHONE ENCOUNTER
"KAZ Olivas 4/24/2023 1201  Contacted the patient and informed her of the message. She stated understanding and thanked me.     Dolly Foss, BEBE  You 4/21/2023  That is fine to take no more than six a month but she cannot take any more Tylenol than what is in this and her Norco.     You  Dolly Foss, APRN 4/21/2023  NO  Patient stated she does not take medication more than six a month and \"it takes her headaches right away\". She declines neurology referral for the time being and would like to know what you prefer.     Dolly Foss, BEBE  You 4/21/2023  How often does she take this? She already takes Norco that has Acetaminophen in it.  If she would like her headaches evaluated by neurology then we could send her off for that.     You  Dolly Foss, APRCAIT 4/21/2023  NO  Last visit 3/10/2023, last refill 1/4/2023.     "

## 2023-04-24 ENCOUNTER — LAB (OUTPATIENT)
Dept: LAB | Facility: HOSPITAL | Age: 63
End: 2023-04-24
Payer: COMMERCIAL

## 2023-04-24 DIAGNOSIS — E11.65 TYPE 2 DIABETES MELLITUS WITH HYPERGLYCEMIA, WITH LONG-TERM CURRENT USE OF INSULIN: ICD-10-CM

## 2023-04-24 DIAGNOSIS — I10 PRIMARY HYPERTENSION: ICD-10-CM

## 2023-04-24 DIAGNOSIS — E55.9 VITAMIN D DEFICIENCY: ICD-10-CM

## 2023-04-24 DIAGNOSIS — Z79.4 TYPE 2 DIABETES MELLITUS WITH HYPERGLYCEMIA, WITH LONG-TERM CURRENT USE OF INSULIN: ICD-10-CM

## 2023-04-24 DIAGNOSIS — E78.2 MIXED HYPERLIPIDEMIA: ICD-10-CM

## 2023-04-24 LAB
25(OH)D3 SERPL-MCNC: 34.7 NG/ML (ref 30–100)
ALBUMIN SERPL-MCNC: 3.9 G/DL (ref 3.5–5.2)
ALBUMIN/GLOB SERPL: 1.2 G/DL
ALP SERPL-CCNC: 114 U/L (ref 39–117)
ALT SERPL W P-5'-P-CCNC: 24 U/L (ref 1–33)
ANION GAP SERPL CALCULATED.3IONS-SCNC: 16 MMOL/L (ref 5–15)
AST SERPL-CCNC: 23 U/L (ref 1–32)
BASOPHILS # BLD AUTO: 0.07 10*3/MM3 (ref 0–0.2)
BASOPHILS NFR BLD AUTO: 0.7 % (ref 0–1.5)
BILIRUB SERPL-MCNC: 0.4 MG/DL (ref 0–1.2)
BUN SERPL-MCNC: 8 MG/DL (ref 8–23)
BUN/CREAT SERPL: 8.4 (ref 7–25)
CALCIUM SPEC-SCNC: 9.3 MG/DL (ref 8.6–10.5)
CHLORIDE SERPL-SCNC: 102 MMOL/L (ref 98–107)
CHOLEST SERPL-MCNC: 169 MG/DL (ref 0–200)
CO2 SERPL-SCNC: 23 MMOL/L (ref 22–29)
CREAT SERPL-MCNC: 0.95 MG/DL (ref 0.57–1)
DEPRECATED RDW RBC AUTO: 42.8 FL (ref 37–54)
EGFRCR SERPLBLD CKD-EPI 2021: 68.3 ML/MIN/1.73
EOSINOPHIL # BLD AUTO: 0.44 10*3/MM3 (ref 0–0.4)
EOSINOPHIL NFR BLD AUTO: 4.2 % (ref 0.3–6.2)
ERYTHROCYTE [DISTWIDTH] IN BLOOD BY AUTOMATED COUNT: 13.7 % (ref 12.3–15.4)
GLOBULIN UR ELPH-MCNC: 3.2 GM/DL
GLUCOSE SERPL-MCNC: 113 MG/DL (ref 65–99)
HBA1C MFR BLD: 9 % (ref 4.8–5.6)
HCT VFR BLD AUTO: 39.5 % (ref 34–46.6)
HDLC SERPL-MCNC: 46 MG/DL (ref 40–60)
HGB BLD-MCNC: 13.2 G/DL (ref 12–15.9)
IMM GRANULOCYTES # BLD AUTO: 0.05 10*3/MM3 (ref 0–0.05)
IMM GRANULOCYTES NFR BLD AUTO: 0.5 % (ref 0–0.5)
LDLC SERPL CALC-MCNC: 97 MG/DL (ref 0–100)
LDLC/HDLC SERPL: 2.03 {RATIO}
LYMPHOCYTES # BLD AUTO: 2.75 10*3/MM3 (ref 0.7–3.1)
LYMPHOCYTES NFR BLD AUTO: 26 % (ref 19.6–45.3)
MCH RBC QN AUTO: 28.9 PG (ref 26.6–33)
MCHC RBC AUTO-ENTMCNC: 33.4 G/DL (ref 31.5–35.7)
MCV RBC AUTO: 86.4 FL (ref 79–97)
MONOCYTES # BLD AUTO: 0.71 10*3/MM3 (ref 0.1–0.9)
MONOCYTES NFR BLD AUTO: 6.7 % (ref 5–12)
NEUTROPHILS NFR BLD AUTO: 6.56 10*3/MM3 (ref 1.7–7)
NEUTROPHILS NFR BLD AUTO: 61.9 % (ref 42.7–76)
NRBC BLD AUTO-RTO: 0 /100 WBC (ref 0–0.2)
PLATELET # BLD AUTO: 411 10*3/MM3 (ref 140–450)
PMV BLD AUTO: 9.9 FL (ref 6–12)
POTASSIUM SERPL-SCNC: 3.7 MMOL/L (ref 3.5–5.2)
PROT SERPL-MCNC: 7.1 G/DL (ref 6–8.5)
RBC # BLD AUTO: 4.57 10*6/MM3 (ref 3.77–5.28)
SODIUM SERPL-SCNC: 141 MMOL/L (ref 136–145)
TRIGL SERPL-MCNC: 149 MG/DL (ref 0–150)
TSH SERPL DL<=0.05 MIU/L-ACNC: 6.76 UIU/ML (ref 0.27–4.2)
VLDLC SERPL-MCNC: 26 MG/DL (ref 5–40)
WBC NRBC COR # BLD: 10.58 10*3/MM3 (ref 3.4–10.8)

## 2023-04-24 PROCEDURE — 36415 COLL VENOUS BLD VENIPUNCTURE: CPT

## 2023-04-24 PROCEDURE — 83036 HEMOGLOBIN GLYCOSYLATED A1C: CPT

## 2023-04-24 PROCEDURE — 82306 VITAMIN D 25 HYDROXY: CPT

## 2023-04-24 PROCEDURE — 80050 GENERAL HEALTH PANEL: CPT

## 2023-04-24 PROCEDURE — 82043 UR ALBUMIN QUANTITATIVE: CPT

## 2023-04-24 PROCEDURE — 80061 LIPID PANEL: CPT

## 2023-04-24 PROCEDURE — 82570 ASSAY OF URINE CREATININE: CPT

## 2023-04-25 LAB
ALBUMIN UR-MCNC: 9.8 MG/DL
CREAT UR-MCNC: 147.3 MG/DL
MICROALBUMIN/CREAT UR: 66.5 MG/G

## 2023-04-26 ENCOUNTER — TELEMEDICINE (OUTPATIENT)
Dept: ENDOCRINOLOGY | Facility: CLINIC | Age: 63
End: 2023-04-26
Payer: COMMERCIAL

## 2023-04-26 DIAGNOSIS — E78.2 MIXED HYPERLIPIDEMIA: Chronic | ICD-10-CM

## 2023-04-26 DIAGNOSIS — E03.9 ACQUIRED HYPOTHYROIDISM: ICD-10-CM

## 2023-04-26 DIAGNOSIS — I10 PRIMARY HYPERTENSION: Chronic | ICD-10-CM

## 2023-04-26 DIAGNOSIS — Z79.4 TYPE 2 DIABETES MELLITUS WITH HYPERGLYCEMIA, WITH LONG-TERM CURRENT USE OF INSULIN: Chronic | ICD-10-CM

## 2023-04-26 DIAGNOSIS — E11.65 TYPE 2 DIABETES MELLITUS WITH HYPERGLYCEMIA, WITH LONG-TERM CURRENT USE OF INSULIN: Chronic | ICD-10-CM

## 2023-04-26 DIAGNOSIS — E55.9 VITAMIN D DEFICIENCY: Primary | Chronic | ICD-10-CM

## 2023-04-26 RX ORDER — LEVOTHYROXINE SODIUM 0.05 MG/1
50 TABLET ORAL DAILY
Qty: 30 TABLET | Refills: 11 | Status: SHIPPED | OUTPATIENT
Start: 2023-04-26 | End: 2024-04-25

## 2023-04-26 NOTE — PROGRESS NOTES
Chief Complaint  Diabetes   Subjective          Ariana Martinez presents to [facility] ENDOCRINOLOGY  Diabetes         You have chosen to receive care through a telehealth visit.  Do you consent to use a video/audio connection for your medical care today? Yes            TELEHEALTH VIDEO VISIT     This a video visit due to [facility] current guidelines for social distancing due to the COVID 19 pandemic      Mode of Visit: Video  Location of patient: home  You have chosen to receive care through a telehealth visit.  Does the patient consent to use a video/audio connection for your medical care today? Yes  The visit included audio and video interaction. No technical issues occurred during this visit.       Primary provider BEBE Rob         63-year-old female presents for follow-up    Diabetes mellitus type 2    Duration since age 26    Timing constant    Quality not controlled    Severity is high        Complications include neuropathy, left BKA, CAD, 2 stents placed February 2020      Current diabetes regimen    Insulin by injections    Current glucose monitoring fingersticks    Checks 4 times daily    States running higher    No lows    Could not afford the CGM            States has extra stress at home    Review of Systems - General ROS: negative              Objective   Vital Signs:   There were no vitals taken for this visit.    Physical Exam  Neurological:      General: No focal deficit present.      Mental Status: She is alert.   Psychiatric:         Mood and Affect: Mood normal.         Thought Content: Thought content normal.        Result Review :   The following data was reviewed by: BEBE Prince on 02/21/2022:  Common labs        2/7/2023    11:45 3/10/2023    11:56 4/24/2023    09:08 4/24/2023    13:43   Common Labs   Glucose   113      BUN   8      Creatinine   0.95      Sodium   141      Potassium   3.7      Chloride   102      Calcium   9.3      Albumin  4.1   3.9   3.9      Total Bilirubin 0.3   0.4   0.4      Alkaline Phosphatase 111   118   114      AST (SGOT) 21   19   23      ALT (SGPT) 20   18   24      WBC   10.58      Hemoglobin   13.2      Hematocrit   39.5      Platelets   411      Total Cholesterol   169      Triglycerides   149      HDL Cholesterol   46      LDL Cholesterol    97      Hemoglobin A1C   9.00      Microalbumin, Urine    9.8                   Assessment and Plan    Diagnoses and all orders for this visit:    1. Vitamin D deficiency (Primary)  -     CBC & Differential; Future  -     Comprehensive Metabolic Panel; Future  -     Hemoglobin A1c; Future  -     Lipid Panel; Future  -     Microalbumin / Creatinine Urine Ratio - Urine, Clean Catch; Future  -     TSH; Future  -     Vitamin B12; Future  -     Vitamin D,25-Hydroxy; Future    2. Mixed hyperlipidemia  -     CBC & Differential; Future  -     Comprehensive Metabolic Panel; Future  -     Hemoglobin A1c; Future  -     Lipid Panel; Future  -     Microalbumin / Creatinine Urine Ratio - Urine, Clean Catch; Future  -     TSH; Future  -     Vitamin B12; Future  -     Vitamin D,25-Hydroxy; Future    3. Type 2 diabetes mellitus with hyperglycemia, with long-term current use of insulin (HCC)  -     CBC & Differential; Future  -     Comprehensive Metabolic Panel; Future  -     Hemoglobin A1c; Future  -     Lipid Panel; Future  -     Microalbumin / Creatinine Urine Ratio - Urine, Clean Catch; Future  -     TSH; Future  -     Vitamin B12; Future  -     Vitamin D,25-Hydroxy; Future    4. Primary hypertension  -     CBC & Differential; Future  -     Comprehensive Metabolic Panel; Future  -     Hemoglobin A1c; Future  -     Lipid Panel; Future  -     Microalbumin / Creatinine Urine Ratio - Urine, Clean Catch; Future  -     TSH; Future  -     Vitamin B12; Future  -     Vitamin D,25-Hydroxy; Future    5. Acquired hypothyroidism  -     CBC & Differential; Future  -     Comprehensive Metabolic Panel;  Future  -     Hemoglobin A1c; Future  -     Lipid Panel; Future  -     Microalbumin / Creatinine Urine Ratio - Urine, Clean Catch; Future  -     TSH; Future  -     Vitamin B12; Future  -     Vitamin D,25-Hydroxy; Future    Other orders  -     levothyroxine (Synthroid) 50 MCG tablet; Take 1 tablet by mouth Daily.  Dispense: 30 tablet; Refill: 11               Glycemic Management     Diabetes mellitus not controlled         Lab Results   Component Value Date    HGBA1C 9.00 (H) 04/24/2023             Dexcom sensor ---off due to finances          Basaglar taking 40 units am  and 45 units pm              Novolog taking for meals     Taking 40 units TID -increase tup to 45     We discussed adjusting for meals and bg         + sliding scale                ==================     Jardiance-- stopped       Metformin--stopped       bydureon - stopped     Victoza stopped due to side effects         januvia 100 mg daily  -stopped               Lipid Management        Taking lipitor 80- mg one at night               LDL needs to be 70 or less           Total Cholesterol   Date Value Ref Range Status   04/24/2023 169 0 - 200 mg/dL Final     Triglycerides   Date Value Ref Range Status   04/24/2023 149 0 - 150 mg/dL Final     HDL Cholesterol   Date Value Ref Range Status   04/24/2023 46 40 - 60 mg/dL Final     LDL Cholesterol    Date Value Ref Range Status   04/24/2023 97 0 - 100 mg/dL Final           Blood Pressure Management: Control of blood pressure       stopped the lasix     Taking norvasc  5 mg daily     Taking metoprolol         Microvascular Complication Monitoring:     Neurontin 100 mg TID       Component      Latest Ref Rng 4/24/2023   Microalbumin/Creatinine Ratio      mg/g 66.5    Creatinine, Urine      mg/dL 147.3    Microalbumin, Urine      mg/dL 9.8                intermittetly takes reglan for gastroparesis     states eye exam was March 2021, no DR      Left BKA     Follows with podiatry         ckd stage III                 Other Diabetes Related Aspects     TSH abnormal from July to Sept 2014           Taking  levothyroxine 25 mcg daily --increase to 50 mcg         Lab Results   Component Value Date    TSH 6.760 (H) 04/24/2023       Vitamin D deficiency        Taking vitamin D supplement                      Follow Up   No follow-ups on file.  Patient was given instructions and counseling regarding her condition or for health maintenance advice. Please see specific information pulled into the AVS if appropriate.         This document has been electronically signed by BEBE Prince on April 26, 2023 09:09 CDT.

## 2023-05-05 DIAGNOSIS — G54.6 PHANTOM PAIN AFTER AMPUTATION OF LOWER EXTREMITY: Chronic | ICD-10-CM

## 2023-05-05 DIAGNOSIS — G89.29 CHRONIC RIGHT-SIDED LOW BACK PAIN WITH RIGHT-SIDED SCIATICA: ICD-10-CM

## 2023-05-05 DIAGNOSIS — M54.41 CHRONIC RIGHT-SIDED LOW BACK PAIN WITH RIGHT-SIDED SCIATICA: ICD-10-CM

## 2023-05-05 RX ORDER — HYDROCODONE BITARTRATE AND ACETAMINOPHEN 7.5; 325 MG/1; MG/1
1 TABLET ORAL EVERY 6 HOURS PRN
Qty: 120 TABLET | Refills: 0 | Status: SHIPPED | OUTPATIENT
Start: 2023-05-05

## 2023-05-05 NOTE — TELEPHONE ENCOUNTER
Incoming Refill Request      Medication requested (name and dose):   Kimberly     Pharmacy where request should be sent:  [facility]    Additional details provided by patient:     Best call back number:     Does the patient have less than a 3 day supply:  [] Yes  [] No    Tiffanie Vaughan  05/05/23, 08:41 CDT

## 2023-05-12 ENCOUNTER — PRIOR AUTHORIZATION (OUTPATIENT)
Dept: FAMILY MEDICINE CLINIC | Facility: CLINIC | Age: 63
End: 2023-05-12
Payer: COMMERCIAL

## 2023-05-12 NOTE — TELEPHONE ENCOUNTER
PA for Pantoprazole was submitted to [facility] ref PA Key [id number]. DX used GERD w/o K21.9.    PA for Pantoprazole was APPROVED effective until 5/11/2026.

## 2023-05-17 ENCOUNTER — APPOINTMENT (OUTPATIENT)
Dept: INTERVENTIONAL RADIOLOGY/VASCULAR | Facility: HOSPITAL | Age: 63
DRG: 872 | End: 2023-05-17
Payer: COMMERCIAL

## 2023-05-17 ENCOUNTER — APPOINTMENT (OUTPATIENT)
Dept: ULTRASOUND IMAGING | Facility: HOSPITAL | Age: 63
DRG: 872 | End: 2023-05-17
Payer: COMMERCIAL

## 2023-05-17 ENCOUNTER — APPOINTMENT (OUTPATIENT)
Dept: GENERAL RADIOLOGY | Facility: HOSPITAL | Age: 63
DRG: 872 | End: 2023-05-17
Payer: COMMERCIAL

## 2023-05-17 ENCOUNTER — HOSPITAL ENCOUNTER (INPATIENT)
Facility: HOSPITAL | Age: 63
LOS: 22 days | Discharge: NURSING, REHAB OR LONG-TERM CARE | DRG: 872 | End: 2023-06-08
Attending: EMERGENCY MEDICINE
Payer: COMMERCIAL

## 2023-05-17 DIAGNOSIS — Z74.09 IMPAIRED MOBILITY AND ADLS: ICD-10-CM

## 2023-05-17 DIAGNOSIS — Z74.09 IMPAIRED PHYSICAL MOBILITY: ICD-10-CM

## 2023-05-17 DIAGNOSIS — Z79.891 LONG TERM PRESCRIPTION OPIATE USE: Chronic | ICD-10-CM

## 2023-05-17 DIAGNOSIS — R33.9 URINARY RETENTION: ICD-10-CM

## 2023-05-17 DIAGNOSIS — Z74.09 IMPAIRED MOBILITY AND ACTIVITIES OF DAILY LIVING: ICD-10-CM

## 2023-05-17 DIAGNOSIS — Z78.9 IMPAIRED MOBILITY AND ADLS: ICD-10-CM

## 2023-05-17 DIAGNOSIS — A41.9 SEPSIS WITHOUT ACUTE ORGAN DYSFUNCTION, DUE TO UNSPECIFIED ORGANISM: Primary | ICD-10-CM

## 2023-05-17 DIAGNOSIS — I10 ESSENTIAL HYPERTENSION: Chronic | ICD-10-CM

## 2023-05-17 DIAGNOSIS — E11.65 TYPE 2 DIABETES MELLITUS WITH HYPERGLYCEMIA, UNSPECIFIED WHETHER LONG TERM INSULIN USE: ICD-10-CM

## 2023-05-17 DIAGNOSIS — L03.115 CELLULITIS OF RIGHT LOWER EXTREMITY: ICD-10-CM

## 2023-05-17 DIAGNOSIS — Z78.9 IMPAIRED MOBILITY AND ACTIVITIES OF DAILY LIVING: ICD-10-CM

## 2023-05-17 LAB
ALBUMIN SERPL-MCNC: 3 G/DL (ref 3.5–5.2)
ALBUMIN/GLOB SERPL: 0.9 G/DL
ALP SERPL-CCNC: 90 U/L (ref 39–117)
ALT SERPL W P-5'-P-CCNC: 11 U/L (ref 1–33)
ANION GAP SERPL CALCULATED.3IONS-SCNC: 15 MMOL/L (ref 5–15)
ARTERIAL PATENCY WRIST A: ABNORMAL
AST SERPL-CCNC: 13 U/L (ref 1–32)
ATMOSPHERIC PRESS: 747 MMHG
BACTERIA UR QL AUTO: ABNORMAL /HPF
BASE EXCESS BLDA CALC-SCNC: 1.3 MMOL/L (ref 0–2)
BASOPHILS # BLD AUTO: 0.13 10*3/MM3 (ref 0–0.2)
BASOPHILS NFR BLD AUTO: 0.7 % (ref 0–1.5)
BDY SITE: ABNORMAL
BILIRUB SERPL-MCNC: 0.7 MG/DL (ref 0–1.2)
BILIRUB UR QL STRIP: NEGATIVE
BUN SERPL-MCNC: 14 MG/DL (ref 8–23)
BUN/CREAT SERPL: 11.1 (ref 7–25)
CALCIUM SPEC-SCNC: 8.5 MG/DL (ref 8.6–10.5)
CHLORIDE SERPL-SCNC: 92 MMOL/L (ref 98–107)
CLARITY UR: ABNORMAL
CO2 SERPL-SCNC: 21 MMOL/L (ref 22–29)
COLOR UR: YELLOW
CREAT SERPL-MCNC: 1.26 MG/DL (ref 0.57–1)
D-LACTATE SERPL-SCNC: 1.7 MMOL/L (ref 0.5–2)
DEPRECATED RDW RBC AUTO: 41.4 FL (ref 37–54)
EGFRCR SERPLBLD CKD-EPI 2021: 48.7 ML/MIN/1.73
EOSINOPHIL # BLD AUTO: 0.01 10*3/MM3 (ref 0–0.4)
EOSINOPHIL NFR BLD AUTO: 0.1 % (ref 0.3–6.2)
ERYTHROCYTE [DISTWIDTH] IN BLOOD BY AUTOMATED COUNT: 13.3 % (ref 12.3–15.4)
FINE GRAN CASTS URNS QL MICRO: ABNORMAL /LPF
FLUAV RNA RESP QL NAA+PROBE: NOT DETECTED
FLUBV RNA RESP QL NAA+PROBE: NOT DETECTED
GAS FLOW AIRWAY: 2 LPM
GLOBULIN UR ELPH-MCNC: 3.3 GM/DL
GLUCOSE BLDC GLUCOMTR-MCNC: 295 MG/DL (ref 70–130)
GLUCOSE BLDC GLUCOMTR-MCNC: 303 MG/DL (ref 70–130)
GLUCOSE BLDC GLUCOMTR-MCNC: 319 MG/DL (ref 70–130)
GLUCOSE SERPL-MCNC: 307 MG/DL (ref 65–99)
GLUCOSE UR STRIP-MCNC: ABNORMAL MG/DL
HBA1C MFR BLD: 9.8 % (ref 4.8–5.6)
HCO3 BLDA-SCNC: 25.1 MMOL/L (ref 20–26)
HCT VFR BLD AUTO: 34.1 % (ref 34–46.6)
HGB BLD-MCNC: 11.8 G/DL (ref 12–15.9)
HGB UR QL STRIP.AUTO: ABNORMAL
HOLD SPECIMEN: NORMAL
HOLD SPECIMEN: NORMAL
HYALINE CASTS UR QL AUTO: ABNORMAL /LPF
IMM GRANULOCYTES # BLD AUTO: 0.29 10*3/MM3 (ref 0–0.05)
IMM GRANULOCYTES NFR BLD AUTO: 1.5 % (ref 0–0.5)
KETONES UR QL STRIP: ABNORMAL
LEUKOCYTE ESTERASE UR QL STRIP.AUTO: NEGATIVE
LYMPHOCYTES # BLD AUTO: 1.01 10*3/MM3 (ref 0.7–3.1)
LYMPHOCYTES NFR BLD AUTO: 5.1 % (ref 19.6–45.3)
Lab: ABNORMAL
MAGNESIUM SERPL-MCNC: 1.6 MG/DL (ref 1.6–2.4)
MCH RBC QN AUTO: 29.4 PG (ref 26.6–33)
MCHC RBC AUTO-ENTMCNC: 34.6 G/DL (ref 31.5–35.7)
MCV RBC AUTO: 85 FL (ref 79–97)
MODALITY: ABNORMAL
MONOCYTES # BLD AUTO: 1.13 10*3/MM3 (ref 0.1–0.9)
MONOCYTES NFR BLD AUTO: 5.8 % (ref 5–12)
NEUTROPHILS NFR BLD AUTO: 17.08 10*3/MM3 (ref 1.7–7)
NEUTROPHILS NFR BLD AUTO: 86.8 % (ref 42.7–76)
NITRITE UR QL STRIP: NEGATIVE
NRBC BLD AUTO-RTO: 0 /100 WBC (ref 0–0.2)
PCO2 BLDA: 36 MM HG (ref 35–45)
PH BLDA: 7.45 PH UNITS (ref 7.35–7.45)
PH UR STRIP.AUTO: 5.5 [PH] (ref 5–9)
PLATELET # BLD AUTO: 249 10*3/MM3 (ref 140–450)
PMV BLD AUTO: 9.8 FL (ref 6–12)
PO2 BLDA: 65.7 MM HG (ref 83–108)
POTASSIUM SERPL-SCNC: 3.3 MMOL/L (ref 3.5–5.2)
POTASSIUM SERPL-SCNC: 4.1 MMOL/L (ref 3.5–5.2)
PROT SERPL-MCNC: 6.3 G/DL (ref 6–8.5)
PROT UR QL STRIP: ABNORMAL
RBC # BLD AUTO: 4.01 10*6/MM3 (ref 3.77–5.28)
RBC # UR STRIP: ABNORMAL /HPF
REF LAB TEST METHOD: ABNORMAL
SAO2 % BLDCOA: 94.8 % (ref 94–99)
SARS-COV-2 RNA RESP QL NAA+PROBE: NOT DETECTED
SODIUM SERPL-SCNC: 128 MMOL/L (ref 136–145)
SP GR UR STRIP: 1.02 (ref 1–1.03)
SQUAMOUS #/AREA URNS HPF: ABNORMAL /HPF
TROPONIN T SERPL HS-MCNC: 39 NG/L
TSH SERPL DL<=0.05 MIU/L-ACNC: 1.17 UIU/ML (ref 0.27–4.2)
UROBILINOGEN UR QL STRIP: ABNORMAL
VENTILATOR MODE: ABNORMAL
WBC # UR STRIP: ABNORMAL /HPF
WBC NRBC COR # BLD: 19.65 10*3/MM3 (ref 3.4–10.8)
WHOLE BLOOD HOLD COAG: NORMAL
WHOLE BLOOD HOLD SPECIMEN: NORMAL

## 2023-05-17 PROCEDURE — 87636 SARSCOV2 & INF A&B AMP PRB: CPT | Performed by: EMERGENCY MEDICINE

## 2023-05-17 PROCEDURE — 84484 ASSAY OF TROPONIN QUANT: CPT

## 2023-05-17 PROCEDURE — 80307 DRUG TEST PRSMV CHEM ANLYZR: CPT | Performed by: FAMILY MEDICINE

## 2023-05-17 PROCEDURE — 63710000001 ONDANSETRON PER 8 MG: Performed by: HOSPITALIST

## 2023-05-17 PROCEDURE — 25010000002 VANCOMYCIN 10 G RECONSTITUTED SOLUTION: Performed by: HOSPITALIST

## 2023-05-17 PROCEDURE — 99285 EMERGENCY DEPT VISIT HI MDM: CPT

## 2023-05-17 PROCEDURE — 36600 WITHDRAWAL OF ARTERIAL BLOOD: CPT

## 2023-05-17 PROCEDURE — 84132 ASSAY OF SERUM POTASSIUM: CPT | Performed by: HOSPITALIST

## 2023-05-17 PROCEDURE — 71045 X-RAY EXAM CHEST 1 VIEW: CPT

## 2023-05-17 PROCEDURE — 81001 URINALYSIS AUTO W/SCOPE: CPT

## 2023-05-17 PROCEDURE — 82948 REAGENT STRIP/BLOOD GLUCOSE: CPT

## 2023-05-17 PROCEDURE — 80050 GENERAL HEALTH PANEL: CPT

## 2023-05-17 PROCEDURE — 25010000002 PIPERACILLIN SOD-TAZOBACTAM PER 1 G: Performed by: EMERGENCY MEDICINE

## 2023-05-17 PROCEDURE — P9612 CATHETERIZE FOR URINE SPEC: HCPCS

## 2023-05-17 PROCEDURE — 93005 ELECTROCARDIOGRAM TRACING: CPT

## 2023-05-17 PROCEDURE — 05HD33Z INSERTION OF INFUSION DEVICE INTO RIGHT CEPHALIC VEIN, PERCUTANEOUS APPROACH: ICD-10-PCS | Performed by: HOSPITALIST

## 2023-05-17 PROCEDURE — 83036 HEMOGLOBIN GLYCOSYLATED A1C: CPT | Performed by: HOSPITALIST

## 2023-05-17 PROCEDURE — 63710000001 INSULIN DETEMIR PER 5 UNITS: Performed by: HOSPITALIST

## 2023-05-17 PROCEDURE — 83735 ASSAY OF MAGNESIUM: CPT

## 2023-05-17 PROCEDURE — 25010000002 VANCOMYCIN 10 G RECONSTITUTED SOLUTION: Performed by: EMERGENCY MEDICINE

## 2023-05-17 PROCEDURE — 25010000002 ONDANSETRON PER 1 MG: Performed by: EMERGENCY MEDICINE

## 2023-05-17 PROCEDURE — 76937 US GUIDE VASCULAR ACCESS: CPT

## 2023-05-17 PROCEDURE — C1751 CATH, INF, PER/CENT/MIDLINE: HCPCS

## 2023-05-17 PROCEDURE — 25010000002 HEPARIN (PORCINE) PER 1000 UNITS: Performed by: HOSPITALIST

## 2023-05-17 PROCEDURE — 36410 VNPNXR 3YR/> PHY/QHP DX/THER: CPT

## 2023-05-17 PROCEDURE — 83605 ASSAY OF LACTIC ACID: CPT

## 2023-05-17 PROCEDURE — 25010000002 HYDROMORPHONE 1 MG/ML SOLUTION: Performed by: EMERGENCY MEDICINE

## 2023-05-17 PROCEDURE — 63710000001 INSULIN ASPART PER 5 UNITS: Performed by: HOSPITALIST

## 2023-05-17 PROCEDURE — 36415 COLL VENOUS BLD VENIPUNCTURE: CPT

## 2023-05-17 PROCEDURE — 87040 BLOOD CULTURE FOR BACTERIA: CPT

## 2023-05-17 PROCEDURE — 93010 ELECTROCARDIOGRAM REPORT: CPT | Performed by: INTERNAL MEDICINE

## 2023-05-17 PROCEDURE — 63710000001 INSULIN REGULAR HUMAN PER 5 UNITS: Performed by: EMERGENCY MEDICINE

## 2023-05-17 PROCEDURE — 82803 BLOOD GASES ANY COMBINATION: CPT

## 2023-05-17 PROCEDURE — 73590 X-RAY EXAM OF LOWER LEG: CPT

## 2023-05-17 PROCEDURE — 25010000002 PIPERACILLIN SOD-TAZOBACTAM PER 1 G: Performed by: HOSPITALIST

## 2023-05-17 RX ORDER — SODIUM CHLORIDE 9 MG/ML
100 INJECTION, SOLUTION INTRAVENOUS CONTINUOUS
Status: DISCONTINUED | OUTPATIENT
Start: 2023-05-17 | End: 2023-05-21

## 2023-05-17 RX ORDER — ALUMINA, MAGNESIA, AND SIMETHICONE 2400; 2400; 240 MG/30ML; MG/30ML; MG/30ML
15 SUSPENSION ORAL EVERY 6 HOURS PRN
Status: DISCONTINUED | OUTPATIENT
Start: 2023-05-17 | End: 2023-06-08 | Stop reason: HOSPADM

## 2023-05-17 RX ORDER — ATORVASTATIN CALCIUM 40 MG/1
80 TABLET, FILM COATED ORAL DAILY
Status: DISCONTINUED | OUTPATIENT
Start: 2023-05-17 | End: 2023-06-08 | Stop reason: HOSPADM

## 2023-05-17 RX ORDER — FUROSEMIDE 40 MG/1
40 TABLET ORAL DAILY
Status: DISCONTINUED | OUTPATIENT
Start: 2023-05-17 | End: 2023-06-08 | Stop reason: HOSPADM

## 2023-05-17 RX ORDER — NITROGLYCERIN 0.4 MG/1
0.4 TABLET SUBLINGUAL
Status: DISCONTINUED | OUTPATIENT
Start: 2023-05-17 | End: 2023-06-05

## 2023-05-17 RX ORDER — ARIPIPRAZOLE 5 MG/1
5 TABLET ORAL DAILY
Status: DISCONTINUED | OUTPATIENT
Start: 2023-05-17 | End: 2023-06-08 | Stop reason: HOSPADM

## 2023-05-17 RX ORDER — FOLIC ACID 1 MG/1
1000 TABLET ORAL DAILY
Status: DISCONTINUED | OUTPATIENT
Start: 2023-05-17 | End: 2023-06-08 | Stop reason: HOSPADM

## 2023-05-17 RX ORDER — ONDANSETRON 2 MG/ML
4 INJECTION INTRAMUSCULAR; INTRAVENOUS ONCE
Status: COMPLETED | OUTPATIENT
Start: 2023-05-17 | End: 2023-05-17

## 2023-05-17 RX ORDER — NICOTINE POLACRILEX 4 MG
15 LOZENGE BUCCAL
Status: DISCONTINUED | OUTPATIENT
Start: 2023-05-17 | End: 2023-05-20 | Stop reason: SDUPTHER

## 2023-05-17 RX ORDER — DEXTROSE MONOHYDRATE 25 G/50ML
25 INJECTION, SOLUTION INTRAVENOUS
Status: DISCONTINUED | OUTPATIENT
Start: 2023-05-17 | End: 2023-05-20 | Stop reason: SDUPTHER

## 2023-05-17 RX ORDER — SODIUM CHLORIDE 0.9 % (FLUSH) 0.9 %
10 SYRINGE (ML) INJECTION AS NEEDED
Status: DISCONTINUED | OUTPATIENT
Start: 2023-05-17 | End: 2023-06-08 | Stop reason: HOSPADM

## 2023-05-17 RX ORDER — BISACODYL 5 MG/1
5 TABLET, DELAYED RELEASE ORAL DAILY PRN
Status: DISCONTINUED | OUTPATIENT
Start: 2023-05-17 | End: 2023-05-20

## 2023-05-17 RX ORDER — HYDROCHLOROTHIAZIDE 12.5 MG/1
12.5 TABLET ORAL DAILY
Status: DISCONTINUED | OUTPATIENT
Start: 2023-05-17 | End: 2023-06-08 | Stop reason: HOSPADM

## 2023-05-17 RX ORDER — INSULIN ASPART 100 [IU]/ML
0-14 INJECTION, SOLUTION INTRAVENOUS; SUBCUTANEOUS
Status: DISCONTINUED | OUTPATIENT
Start: 2023-05-17 | End: 2023-06-06

## 2023-05-17 RX ORDER — ONDANSETRON 4 MG/1
4 TABLET, FILM COATED ORAL EVERY 6 HOURS PRN
Status: DISCONTINUED | OUTPATIENT
Start: 2023-05-17 | End: 2023-06-08 | Stop reason: HOSPADM

## 2023-05-17 RX ORDER — LEVOTHYROXINE SODIUM 0.05 MG/1
50 TABLET ORAL EVERY MORNING
Status: DISCONTINUED | OUTPATIENT
Start: 2023-05-17 | End: 2023-06-08 | Stop reason: HOSPADM

## 2023-05-17 RX ORDER — POLYETHYLENE GLYCOL 3350 17 G/17G
17 POWDER, FOR SOLUTION ORAL DAILY PRN
Status: DISCONTINUED | OUTPATIENT
Start: 2023-05-17 | End: 2023-05-20

## 2023-05-17 RX ORDER — ONDANSETRON 2 MG/ML
4 INJECTION INTRAMUSCULAR; INTRAVENOUS EVERY 6 HOURS PRN
Status: DISCONTINUED | OUTPATIENT
Start: 2023-05-17 | End: 2023-05-20 | Stop reason: SDUPTHER

## 2023-05-17 RX ORDER — HEPARIN SODIUM 5000 [USP'U]/ML
5000 INJECTION, SOLUTION INTRAVENOUS; SUBCUTANEOUS EVERY 8 HOURS SCHEDULED
Status: DISCONTINUED | OUTPATIENT
Start: 2023-05-17 | End: 2023-06-08 | Stop reason: HOSPADM

## 2023-05-17 RX ORDER — AMLODIPINE BESYLATE 5 MG/1
5 TABLET ORAL DAILY
Status: DISCONTINUED | OUTPATIENT
Start: 2023-05-17 | End: 2023-06-08 | Stop reason: HOSPADM

## 2023-05-17 RX ORDER — METOCLOPRAMIDE 10 MG/1
10 TABLET ORAL 4 TIMES DAILY PRN
Status: DISCONTINUED | OUTPATIENT
Start: 2023-05-17 | End: 2023-06-08 | Stop reason: HOSPADM

## 2023-05-17 RX ORDER — SODIUM CHLORIDE 9 MG/ML
40 INJECTION, SOLUTION INTRAVENOUS AS NEEDED
Status: DISCONTINUED | OUTPATIENT
Start: 2023-05-17 | End: 2023-06-08 | Stop reason: HOSPADM

## 2023-05-17 RX ORDER — BISACODYL 10 MG
10 SUPPOSITORY, RECTAL RECTAL DAILY PRN
Status: DISCONTINUED | OUTPATIENT
Start: 2023-05-17 | End: 2023-05-20 | Stop reason: SDUPTHER

## 2023-05-17 RX ORDER — CLOPIDOGREL BISULFATE 75 MG/1
75 TABLET ORAL DAILY
Status: DISCONTINUED | OUTPATIENT
Start: 2023-05-17 | End: 2023-06-08 | Stop reason: HOSPADM

## 2023-05-17 RX ORDER — POTASSIUM CHLORIDE 750 MG/1
20 CAPSULE, EXTENDED RELEASE ORAL ONCE
Status: COMPLETED | OUTPATIENT
Start: 2023-05-17 | End: 2023-05-17

## 2023-05-17 RX ORDER — LORAZEPAM 0.5 MG/1
0.5 TABLET ORAL EVERY 8 HOURS PRN
Status: DISPENSED | OUTPATIENT
Start: 2023-05-17 | End: 2023-05-24

## 2023-05-17 RX ORDER — ACETAMINOPHEN 325 MG/1
650 TABLET ORAL ONCE
Status: COMPLETED | OUTPATIENT
Start: 2023-05-17 | End: 2023-05-17

## 2023-05-17 RX ORDER — ACETAMINOPHEN 325 MG/1
650 TABLET ORAL EVERY 4 HOURS PRN
Status: DISCONTINUED | OUTPATIENT
Start: 2023-05-17 | End: 2023-06-08 | Stop reason: HOSPADM

## 2023-05-17 RX ORDER — SUCRALFATE 1 G/1
1 TABLET ORAL 4 TIMES DAILY
Status: DISCONTINUED | OUTPATIENT
Start: 2023-05-17 | End: 2023-06-08 | Stop reason: HOSPADM

## 2023-05-17 RX ORDER — GABAPENTIN 100 MG/1
100 CAPSULE ORAL EVERY 12 HOURS
Status: DISCONTINUED | OUTPATIENT
Start: 2023-05-17 | End: 2023-05-19

## 2023-05-17 RX ORDER — ISOSORBIDE MONONITRATE 30 MG/1
30 TABLET, EXTENDED RELEASE ORAL DAILY
Status: DISCONTINUED | OUTPATIENT
Start: 2023-05-17 | End: 2023-06-08 | Stop reason: HOSPADM

## 2023-05-17 RX ORDER — SODIUM CHLORIDE 0.9 % (FLUSH) 0.9 %
10 SYRINGE (ML) INJECTION EVERY 12 HOURS SCHEDULED
Status: DISCONTINUED | OUTPATIENT
Start: 2023-05-17 | End: 2023-06-08 | Stop reason: HOSPADM

## 2023-05-17 RX ORDER — ASPIRIN 325 MG
325 TABLET, DELAYED RELEASE (ENTERIC COATED) ORAL DAILY
Status: DISCONTINUED | OUTPATIENT
Start: 2023-05-17 | End: 2023-06-08 | Stop reason: HOSPADM

## 2023-05-17 RX ORDER — VENLAFAXINE HYDROCHLORIDE 75 MG/1
75 CAPSULE, EXTENDED RELEASE ORAL
Status: DISCONTINUED | OUTPATIENT
Start: 2023-05-18 | End: 2023-06-08 | Stop reason: HOSPADM

## 2023-05-17 RX ORDER — PANTOPRAZOLE SODIUM 40 MG/1
40 TABLET, DELAYED RELEASE ORAL DAILY
Status: DISCONTINUED | OUTPATIENT
Start: 2023-05-17 | End: 2023-06-08 | Stop reason: HOSPADM

## 2023-05-17 RX ORDER — RANOLAZINE 500 MG/1
500 TABLET, EXTENDED RELEASE ORAL EVERY 12 HOURS SCHEDULED
Status: DISCONTINUED | OUTPATIENT
Start: 2023-05-17 | End: 2023-06-08 | Stop reason: HOSPADM

## 2023-05-17 RX ORDER — HYDROCODONE BITARTRATE AND ACETAMINOPHEN 7.5; 325 MG/1; MG/1
1 TABLET ORAL EVERY 6 HOURS PRN
Status: DISCONTINUED | OUTPATIENT
Start: 2023-05-17 | End: 2023-05-29

## 2023-05-17 RX ADMIN — ARIPIPRAZOLE 5 MG: 5 TABLET ORAL at 17:52

## 2023-05-17 RX ADMIN — VANCOMYCIN HYDROCHLORIDE 2250 MG: 10 INJECTION, POWDER, LYOPHILIZED, FOR SOLUTION INTRAVENOUS at 10:06

## 2023-05-17 RX ADMIN — CLOPIDOGREL BISULFATE 75 MG: 75 TABLET ORAL at 17:31

## 2023-05-17 RX ADMIN — INSULIN ASPART 10 UNITS: 100 INJECTION, SOLUTION INTRAVENOUS; SUBCUTANEOUS at 17:33

## 2023-05-17 RX ADMIN — HEPARIN SODIUM 5000 UNITS: 5000 INJECTION INTRAVENOUS; SUBCUTANEOUS at 21:53

## 2023-05-17 RX ADMIN — LEVOTHYROXINE SODIUM 50 MCG: 50 TABLET ORAL at 17:31

## 2023-05-17 RX ADMIN — POTASSIUM CHLORIDE 20 MEQ: 10 CAPSULE, COATED, EXTENDED RELEASE ORAL at 11:28

## 2023-05-17 RX ADMIN — SUCRALFATE 1 G: 1 TABLET ORAL at 21:53

## 2023-05-17 RX ADMIN — ISOSORBIDE MONONITRATE 30 MG: 30 TABLET, EXTENDED RELEASE ORAL at 17:31

## 2023-05-17 RX ADMIN — ONDANSETRON HYDROCHLORIDE 4 MG: 4 TABLET, FILM COATED ORAL at 21:53

## 2023-05-17 RX ADMIN — RANOLAZINE 500 MG: 500 TABLET, FILM COATED, EXTENDED RELEASE ORAL at 21:53

## 2023-05-17 RX ADMIN — FOLIC ACID 1000 MCG: 1 TABLET ORAL at 17:31

## 2023-05-17 RX ADMIN — HYDROCODONE BITARTRATE AND ACETAMINOPHEN 1 TABLET: 7.5; 325 TABLET ORAL at 17:52

## 2023-05-17 RX ADMIN — AMLODIPINE BESYLATE 5 MG: 5 TABLET ORAL at 17:31

## 2023-05-17 RX ADMIN — PANTOPRAZOLE SODIUM 40 MG: 40 TABLET, DELAYED RELEASE ORAL at 17:31

## 2023-05-17 RX ADMIN — ACETAMINOPHEN 650 MG: 325 TABLET, FILM COATED ORAL at 08:29

## 2023-05-17 RX ADMIN — FUROSEMIDE 40 MG: 40 TABLET ORAL at 17:30

## 2023-05-17 RX ADMIN — Medication 10 ML: at 21:53

## 2023-05-17 RX ADMIN — GABAPENTIN 100 MG: 100 CAPSULE ORAL at 21:53

## 2023-05-17 RX ADMIN — ASPIRIN 325 MG: 325 TABLET, COATED ORAL at 17:52

## 2023-05-17 RX ADMIN — SODIUM CHLORIDE 100 ML/HR: 9 INJECTION, SOLUTION INTRAVENOUS at 21:52

## 2023-05-17 RX ADMIN — HYDROMORPHONE HYDROCHLORIDE 0.5 MG: 1 INJECTION, SOLUTION INTRAMUSCULAR; INTRAVENOUS; SUBCUTANEOUS at 11:28

## 2023-05-17 RX ADMIN — SODIUM CHLORIDE 100 ML/HR: 9 INJECTION, SOLUTION INTRAVENOUS at 10:04

## 2023-05-17 RX ADMIN — ONDANSETRON 4 MG: 2 INJECTION INTRAMUSCULAR; INTRAVENOUS at 11:27

## 2023-05-17 RX ADMIN — PIPERACILLIN SODIUM AND TAZOBACTAM SODIUM 3.38 G: 3; .375 INJECTION, POWDER, LYOPHILIZED, FOR SOLUTION INTRAVENOUS at 17:30

## 2023-05-17 RX ADMIN — INSULIN DETEMIR 40 UNITS: 100 INJECTION, SOLUTION SUBCUTANEOUS at 21:53

## 2023-05-17 RX ADMIN — SUCRALFATE 1 G: 1 TABLET ORAL at 17:31

## 2023-05-17 RX ADMIN — HYDROCHLOROTHIAZIDE 12.5 MG: 12.5 TABLET ORAL at 17:31

## 2023-05-17 RX ADMIN — HUMAN INSULIN 4 UNITS: 100 INJECTION, SOLUTION SUBCUTANEOUS at 11:28

## 2023-05-17 RX ADMIN — VANCOMYCIN HYDROCHLORIDE 1250 MG: 10 INJECTION, POWDER, LYOPHILIZED, FOR SOLUTION INTRAVENOUS at 21:52

## 2023-05-17 RX ADMIN — ATORVASTATIN CALCIUM 80 MG: 40 TABLET, FILM COATED ORAL at 17:30

## 2023-05-17 NOTE — PROGRESS NOTES
"Pharmacokinetics by Pharmacy - Vancomycin Initial Consult    Ariana Martinez is a 63 y.o. female being initiated on vancomycin for skin and soft tissue infection. Patient is also receiving piperacillin-tazobactam.    Objective:     [Ht: 172.7 cm (68\"); Wt: 126 kg (277 lb 12.8 oz)]     WBC   Date Value Ref Range Status   05/17/2023 19.65 (H) 3.40 - 10.80 10*3/mm3 Final      Lactate   Date Value Ref Range Status   05/17/2023 1.7 0.5 - 2.0 mmol/L Final      Temp Readings from Last 1 Encounters:   05/17/23 98.5 °F (36.9 °C) (Temporal)     Estimated Creatinine Clearance: 65.7 mL/min (A) (by C-G formula based on SCr of 1.26 mg/dL (H)).   Creatinine   Date Value Ref Range Status   05/17/2023 1.26 (H) 0.57 - 1.00 mg/dL Final       Baseline culture results:  Microbiology Results (last 10 days)     Procedure Component Value - Date/Time    COVID-19 and FLU A/B PCR - Swab, Nasopharynx [[id number]]  (Normal) Collected: 05/17/23 0830    Lab Status: Final result Specimen: Swab from Nasopharynx Updated: 05/17/23 0908     COVID19 Not Detected     Influenza A PCR Not Detected     Influenza B PCR Not Detected    Narrative:      Fact sheet for providers: https://www.fda.gov/media/849910/download    Fact sheet for patients: https://www.fda.gov/media/178858/download    Test performed by PCR.        No results found for: RESPCX    Assessment    WBC 19.65, elevated   Scr 1.26, elevated (baseline Scr 0.8-1)  Patient febrile in last 24 hours (Jnst=502.1 F)    5/17 blood culture x2: in process    A one time loading dose of vancomycin 2250 mg IV was given on 5/17 at 1006. Will initiate vancomycin 1250 mg IV Q12H. This gives an estimated trough and peak of 14.2 (goal 10-20) and 27, respectively. The predicted AUC is 481 (goal 400-600).     Plan  1. Initiate vancomycin 1250 mg IV Q12H starting 5/17 at 2200. Consult ends 5/24/23.   2. Will order vancomycin peak on 5/19 at 0030 and vancomycin trough on 5/19 at 0930.  3. Pharmacy will monitor " renal function and adjust dose accordingly.    Julián Carreon [facility]  05/17/23 16:20 CDT

## 2023-05-17 NOTE — ED NOTES
Nursing report ED to floor  Ariana Martinez  63 y.o.  female    HPI:   Chief Complaint   Patient presents with    Weakness - Generalized    Fever       Admitting doctor:   Reji Mendoza DO    Consulting provider(s):  Consults       Date and Time Order Name Status Description    5/17/2023 11:01 AM Hospitalist (on-call MD unless specified)               Admitting diagnosis:   The primary encounter diagnosis was Sepsis without acute organ dysfunction, due to unspecified organism. Diagnoses of Cellulitis of right lower extremity and Type 2 diabetes mellitus with hyperglycemia, unspecified whether long term insulin use were also pertinent to this visit.    Code status:   Current Code Status       Date Active Code Status Order ID Comments User Context       5/17/2023 1143 CPR (Attempt to Resuscitate) [id number]  Reji Mendoza DO ED        Question Answer    Code Status (Patient has no pulse and is not breathing) CPR (Attempt to Resuscitate)    Medical Interventions (Patient has pulse or is breathing) Full Support    Level Of Support Discussed With Patient                    Allergies:   Seroquel [quetiapine], Avandia [rosiglitazone], Morphine and related, and Oxycodone    Intake and Output    Intake/Output Summary (Last 24 hours) at 5/17/2023 1240  Last data filed at 5/17/2023 1239  Gross per 24 hour   Intake 600 ml   Output --   Net 600 ml       Weight:       05/17/23  0801   Weight: 116 kg (255 lb)       Most recent vitals:   Vitals:    05/17/23 1011 05/17/23 1049 05/17/23 1130 05/17/23 1229   BP: 149/64 120/58 123/59 137/64   Pulse: 83 82 82 76   Resp: 24 19 20 20   Temp:   99.7 °F (37.6 °C)    TempSrc:   Oral    SpO2: 96% 95% 98% 95%   Weight:       Height:         Oxygen Therapy: 2L per nc    Active LDAs/IV Access:   Lines, Drains & Airways       Active LDAs       Name Placement date Placement time Site Days    Midline Catheter - Double Lumen 05/17/23 Right Cephalic 05/17/23  0943  -- less than 1                     Labs (abnormal labs have a star):   Labs Reviewed   COMPREHENSIVE METABOLIC PANEL - Abnormal; Notable for the following components:       Result Value    Glucose 307 (*)     Creatinine 1.26 (*)     Sodium 128 (*)     Potassium 3.3 (*)     Chloride 92 (*)     CO2 21.0 (*)     Calcium 8.5 (*)     Albumin 3.0 (*)     eGFR 48.7 (*)     All other components within normal limits    Narrative:     GFR Normal >60  Chronic Kidney Disease <60  Kidney Failure <15     SINGLE HSTROPONIN T - Abnormal; Notable for the following components:    HS Troponin T 39 (*)     All other components within normal limits    Narrative:     High Sensitive Troponin T Reference Range:  <10.0 ng/L- Negative Female for AMI  <15.0 ng/L- Negative Male for AMI  >=10 - Abnormal Female indicating possible myocardial injury.  >=15 - Abnormal Male indicating possible myocardial injury.   Clinicians would have to utilize clinical acumen, EKG, Troponin, and serial changes to determine if it is an Acute Myocardial Infarction or myocardial injury due to an underlying chronic condition.        URINALYSIS W/ CULTURE IF INDICATED - Abnormal; Notable for the following components:    Appearance, UA Cloudy (*)     Glucose, UA >=1000 mg/dL (3+) (*)     Ketones, UA Trace (*)     Blood, UA Trace (*)     Protein, UA >=300 mg/dL (3+) (*)     All other components within normal limits    Narrative:     In absence of clinical symptoms, the presence of pyuria, bacteria, and/or nitrites on the urinalysis result does not correlate with infection.   CBC WITH AUTO DIFFERENTIAL - Abnormal; Notable for the following components:    WBC 19.65 (*)     Hemoglobin 11.8 (*)     Neutrophil % 86.8 (*)     Lymphocyte % 5.1 (*)     Eosinophil % 0.1 (*)     Immature Grans % 1.5 (*)     Neutrophils, Absolute 17.08 (*)     Monocytes, Absolute 1.13 (*)     Immature Grans, Absolute 0.29 (*)     All other components within normal limits   BLOOD GAS, ARTERIAL - Abnormal; Notable for  the following components:    pH, Arterial 7.451 (*)     pO2, Arterial 65.7 (*)     All other components within normal limits   URINALYSIS, MICROSCOPIC ONLY - Abnormal; Notable for the following components:    RBC, UA 0-2 (*)     Squamous Epithelial Cells, UA 3-5 (*)     All other components within normal limits   POCT GLUCOSE FINGERSTICK - Abnormal; Notable for the following components:    Glucose 319 (*)     All other components within normal limits   COVID-19 AND FLU A/B, NP SWAB IN TRANSPORT MEDIA 8-12 HR TAT - Normal    Narrative:     Fact sheet for providers: https://www.fda.gov/media/177124/download    Fact sheet for patients: https://www.fda.gov/media/229459/download    Test performed by PCR.   MAGNESIUM - Normal   LACTIC ACID, PLASMA - Normal   BLOOD CULTURE   BLOOD CULTURE   RAINBOW DRAW    Narrative:     The following orders were created for panel order [facility] Draw.  Procedure                               Abnormality         Status                     ---------                               -----------         ------                     Green Top (Gel)[[id number]]                                  Final result               Lavender Top[[id number]]                                     Final result               Gold Top - SST[[id number]]                                   Final result               Light Blue Top[[id number]]                                   Final result                 Please view results for these tests on the individual orders.   BLOOD GAS, ARTERIAL   POCT GLUCOSE FINGERSTICK   CBC AND DIFFERENTIAL    Narrative:     The following orders were created for panel order CBC & Differential.  Procedure                               Abnormality         Status                     ---------                               -----------         ------                     CBC Auto Differential[[id number]]        Abnormal            Final result               Scan Slide[[id number]]                                                                     Please view results for these tests on the individual orders.   GREEN TOP   LAVENDER TOP   GOLD TOP - SST   LIGHT BLUE TOP       Meds given in ED:   Medications   sodium chloride 0.9 % flush 10 mL (has no administration in time range)   sodium chloride 0.9 % infusion (100 mL/hr Intravenous Currently Infusing 5/17/23 1012)   acetaminophen (TYLENOL) tablet 650 mg (650 mg Oral Given 5/17/23 0829)   piperacillin-tazobactam (ZOSYN) 3.375 g/100 mL 0.9% NS IVPB (mbp) (0 g Intravenous Stopped 5/17/23 1239)   vancomycin 2250 mg/500 mL 0.9% NS IVPB ([facility]) (0 mg Intravenous Stopped 5/17/23 1239)   insulin regular (humuLIN R,novoLIN R) injection 4 Units (4 Units Subcutaneous Given 5/17/23 1128)   potassium chloride (MICRO-K) CR capsule 20 mEq (20 mEq Oral Given 5/17/23 1128)   ondansetron (ZOFRAN) injection 4 mg (4 mg Intravenous Given 5/17/23 1127)   HYDROmorphone (DILAUDID) injection 0.5 mg (0.5 mg Intravenous Given 5/17/23 1128)     sodium chloride, 100 mL/hr, Last Rate: 100 mL/hr (05/17/23 1012)         NIH Stroke Scale:       Isolation/Infection(s):  No active isolations   No active infections     COVID Testing  Collected yes  Resulted negative    Nursing report ED to floor:  Mental status: disoriented to time  Ambulatory status: two assist, 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  Precautions: falls    ED nurse phone [phone]- [phone]

## 2023-05-17 NOTE — ED PROVIDER NOTES
Subjective   History of Present Illness  62yo female pmh significant htn/hyperlipidemia/dm2/hypothyroid/cad/HFpEF/obesity presents ED via EMS c/o 1d hx fever/chills/generalized weakness.  ROS (+) nonproductive cough.  Denies sore throat/rhinorrhea/chest pain/soa/abd pain/n/v/d/dysuria.    History provided by:  Patient  Illness  Severity:  Severe  Onset quality:  Sudden  Chronicity:  New  Associated symptoms: cough, fatigue and fever    Associated symptoms: no shortness of breath        Review of Systems   Constitutional: Positive for chills, fatigue and fever.   HENT: Negative.    Eyes: Negative.    Respiratory: Positive for cough. Negative for shortness of breath.    Cardiovascular: Negative.    Gastrointestinal: Negative.    Genitourinary: Negative.    Musculoskeletal: Negative.    Skin: Positive for color change.   Allergic/Immunologic: Negative for immunocompromised state.   All other systems reviewed and are negative.      Past Medical History:   Diagnosis Date   • Acute bacterial endocarditis 3/13/2021   • Angina, class IV    • Anxiety    • Anxiety and depression    • Arthritis    • Benign paroxysmal positional vertigo    • Bladder disorder     has bladder stimulator   • Carpal tunnel syndrome    • CHF (congestive heart failure)    • Chronic pain    • Coronary atherosclerosis     hx CABG 2005.  is followed by Dr Kwon   • Depression    • Diabetes mellitus     Type 2, controlled   • Diabetic polyneuropathy    • Disease of thyroid gland    • Elevated cholesterol    • Female stress incontinence    • Foot pain, left    • Full dentures    • Gastroparesis    • GERD (gastroesophageal reflux disease)    • Hyperlipidemia    • Hypertension    • Low back pain    • Malaise and fatigue    • Multiple joint pain    • Obesity     Refuses to be weighed   • Occlusion and stenosis of bilateral carotid arteries 6/18/2021   • Otalgia     Both   • Perforation of tympanic membrane     Left   • Postoperative wound infection    •  Vitamin D deficiency    • Wears glasses     reading       Allergies   Allergen Reactions   • Seroquel [Quetiapine] Anaphylaxis   • Avandia [Rosiglitazone] Swelling   • Morphine And Related Hallucinations   • Oxycodone Hallucinations       Past Surgical History:   Procedure Laterality Date   • ABDOMINAL SURGERY     • AMPUTATION FOOT     • ANGIOPLASTY      coronary   • ANKLE ARTHROSCOPY Left 02/26/2021    Procedure: Left foot hardware removal, ankle arthroscopy, bone grafting , left foot exostectomy;  Surgeon: Ignacio Lord DPM;  Location: [facility] OR;  Service: Podiatry;  Laterality: Left;   • BREAST BIOPSY Right    • CARDIAC CATHETERIZATION     • CARDIAC CATHETERIZATION N/A 06/20/2017    Procedure: Right Heart Cath;  Surgeon: Can Kwon MD PhD;  Location: [facility] CATH INVASIVE LOCATION;  Service:    • CARDIAC CATHETERIZATION N/A 02/18/2020    Procedure: Left Heart Cath;  Surgeon: Catalina Cooper MD;  Location: [facility] CATH INVASIVE LOCATION;  Service: Cardiology;  Laterality: N/A;   • CARDIAC CATHETERIZATION N/A 04/06/2022    Procedure: Left Heart Cath;  Surgeon: Sheryl Navas MD;  Location: [facility] CATH INVASIVE LOCATION;  Service: Cardiology;  Laterality: N/A;   • CARPAL TUNNEL RELEASE     • CHOLECYSTECTOMY     • COLONOSCOPY N/A 06/24/2020    Procedure: COLONOSCOPY;  Surgeon: Julián Maldonado MD;  Location: [facility] ENDOSCOPY;  Service: Gastroenterology;  Laterality: N/A;   • CORONARY ARTERY BYPASS GRAFT  2005   • ENDOSCOPY N/A 10/19/2018    Procedure: ESOPHAGOGASTRODUODENOSCOPY possible dilation;  Surgeon: Julián Maldonado MD;  Location: [facility] ENDOSCOPY;  Service: Gastroenterology   • ENDOSCOPY N/A 06/24/2020    Procedure: ESOPHAGOGASTRODUODENOSCOPY WED appt please;  Surgeon: Julián Maldonado MD;  Location: [facility] ENDOSCOPY;  Service: Gastroenterology;  Laterality: N/A;   • ENDOSCOPY N/A 06/10/2022    Procedure: ESOPHAGOGASTRODUODENOSCOPY   room 380;  Surgeon: Jeremiah Wilkins MD;  Location:  [facility] ENDOSCOPY;  Service: Gastroenterology;  Laterality: N/A;   • ENDOSCOPY N/A 1/10/2023    Procedure: ESOPHAGOGASTRODUODENOSCOPY;  Surgeon: Jeremiah Wilkins MD;  Location: [facility] ENDOSCOPY;  Service: Gastroenterology;  Laterality: N/A;   • ENDOSCOPY AND COLONOSCOPY     • FOOT SURGERY      Toes   • FOOT SURGERY     • GASTRIC BANDING      Revision, laparoscopic   • HYSTERECTOMY     • INCISION AND DRAINAGE LEG Left 03/12/2021    Procedure: Left ankle arthroscopic irrigation and debridement, screw removal;  Surgeon: Ignacio Lord DPM;  Location: [facility] OR;  Service: Podiatry;  Laterality: Left;   • MOUTH SURGERY     • SALPINGO OOPHORECTOMY     • SHOULDER SURGERY     • SUBTALAR ARTHRODESIS Left 01/16/2019    Procedure: LEFT FOOT HARDWARE REMOVAL, FIFTH METATARSAL , OPEN REDUCTION INTERNAL FIXATION, CALCANEAL OSTEOTOMY;  Surgeon: Ignacio Lord DPM;  Location: [facility] OR;  Service: Podiatry   • SUBTALAR ARTHRODESIS Left 10/16/2019    Procedure: foot hardware removal, subtalar joint fusion  possible de/reattachment of achilles tendon        (c-arm);  Surgeon: Ignacio Lord DPM;  Location: [facility] OR;  Service: Podiatry   • SUBTALAR ARTHRODESIS Left 09/30/2020    Procedure: subtalar, talonavicular joint arthrodesis.  Removal hardware.          (c-arm);  Surgeon: Ignacio Lord DPM;  Location: [facility];  Service: Podiatry;  Laterality: Left;   • TRANSESOPHAGEAL ECHOCARDIOGRAM (LAMONTE)      With color flow       Family History   Problem Relation Age of Onset   • Diabetes Other    • Heart disease Other    • Hypertension Other    • Heart disease Mother    • Stroke Mother    • Hypertension Mother    • Diabetes Sister    • Heart disease Sister    • Hypertension Sister    • Heart disease Sister    • Diabetes Sister    • Hypertension Sister    • Diabetes Sister    • Diabetes Sister    • Diabetes Sister    • Diabetes Sister        Social History     Socioeconomic History   • Marital status:    Tobacco  Use   • Smoking status: Never   • Smokeless tobacco: Never   Vaping Use   • Vaping Use: Never used   Substance and Sexual Activity   • Alcohol use: No   • Drug use: No   • Sexual activity: Defer           Objective   Physical Exam  Vitals and nursing note reviewed.   Constitutional:       General: She is not in acute distress.     Appearance: She is obese. She is ill-appearing.   HENT:      Head: Normocephalic and atraumatic.      Right Ear: Tympanic membrane, ear canal and external ear normal.      Left Ear: Tympanic membrane, ear canal and external ear normal.      Nose: Nose normal.      Mouth/Throat:      Mouth: Mucous membranes are moist.      Pharynx: Oropharynx is clear.   Eyes:      Pupils: Pupils are equal, round, and reactive to light.   Neck:      Meningeal: Brudzinski's sign absent.   Cardiovascular:      Rate and Rhythm: Normal rate and regular rhythm.      Pulses: Normal pulses.      Heart sounds: Normal heart sounds. No murmur heard.    No friction rub. No gallop.   Pulmonary:      Effort: Tachypnea present.      Breath sounds: Normal breath sounds and air entry. No decreased breath sounds, wheezing, rhonchi or rales.   Abdominal:      General: Abdomen is protuberant. Bowel sounds are normal.      Palpations: Abdomen is soft.      Tenderness: There is no abdominal tenderness. There is no guarding or rebound. Negative signs include Coombs's sign and McBurney's sign.   Musculoskeletal:      Cervical back: Normal range of motion and neck supple. No rigidity.      Right lower leg: No edema.      Left lower leg: No edema.        Legs:    Lymphadenopathy:      Cervical: No cervical adenopathy.   Skin:     General: Skin is warm and dry.      Findings: Erythema present.   Neurological:      General: No focal deficit present.      Mental Status: She is alert and oriented to person, place, and time.      GCS: GCS eye subscore is 4. GCS verbal subscore is 5. GCS motor subscore is 6.         Procedures            ED Course      Labs Reviewed   COMPREHENSIVE METABOLIC PANEL - Abnormal; Notable for the following components:       Result Value    Glucose 307 (*)     Creatinine 1.26 (*)     Sodium 128 (*)     Potassium 3.3 (*)     Chloride 92 (*)     CO2 21.0 (*)     Calcium 8.5 (*)     Albumin 3.0 (*)     eGFR 48.7 (*)     All other components within normal limits    Narrative:     GFR Normal >60  Chronic Kidney Disease <60  Kidney Failure <15     SINGLE HSTROPONIN T - Abnormal; Notable for the following components:    HS Troponin T 39 (*)     All other components within normal limits    Narrative:     High Sensitive Troponin T Reference Range:  <10.0 ng/L- Negative Female for AMI  <15.0 ng/L- Negative Male for AMI  >=10 - Abnormal Female indicating possible myocardial injury.  >=15 - Abnormal Male indicating possible myocardial injury.   Clinicians would have to utilize clinical acumen, EKG, Troponin, and serial changes to determine if it is an Acute Myocardial Infarction or myocardial injury due to an underlying chronic condition.        CBC WITH AUTO DIFFERENTIAL - Abnormal; Notable for the following components:    WBC 19.65 (*)     Hemoglobin 11.8 (*)     Neutrophil % 86.8 (*)     Lymphocyte % 5.1 (*)     Eosinophil % 0.1 (*)     Immature Grans % 1.5 (*)     Neutrophils, Absolute 17.08 (*)     Monocytes, Absolute 1.13 (*)     Immature Grans, Absolute 0.29 (*)     All other components within normal limits   BLOOD GAS, ARTERIAL - Abnormal; Notable for the following components:    pH, Arterial 7.451 (*)     pO2, Arterial 65.7 (*)     All other components within normal limits   COVID-19 AND FLU A/B, NP SWAB IN TRANSPORT MEDIA 8-12 HR TAT - Normal    Narrative:     Fact sheet for providers: https://www.fda.gov/media/942956/download    Fact sheet for patients: https://www.fda.gov/media/330897/download    Test performed by PCR.   MAGNESIUM - Normal   LACTIC ACID, PLASMA - Normal   BLOOD CULTURE   BLOOD CULTURE   RAINBOW DRAW     Narrative:     The following orders were created for panel order [place] Draw.  Procedure                               Abnormality         Status                     ---------                               -----------         ------                     Green Top (Gel)[[id number]]                                  Final result               Lavender Top[[id number]]                                     Final result               Gold Top - SST[[id number]]                                   Final result               Light Blue Top[[id number]]                                   Final result                 Please view results for these tests on the individual orders.   BLOOD GAS, ARTERIAL   URINALYSIS W/ CULTURE IF INDICATED   POCT GLUCOSE FINGERSTICK   CBC AND DIFFERENTIAL    Narrative:     The following orders were created for panel order CBC & Differential.  Procedure                               Abnormality         Status                     ---------                               -----------         ------                     CBC Auto Differential[[id number]]        Abnormal            Final result               Scan Slide[[id number]]                                                                    Please view results for these tests on the individual orders.   GREEN TOP   LAVENDER TOP   GOLD TOP - SST   LIGHT BLUE TOP     XR Tibia Fibula 2 View Right    Result Date: 5/17/2023  Narrative: TECHNIQUE: AP and lateral views of the right foreleg were obtained. HISTORY: Cellulitis.  Pain along the medial leg with redness and swelling. COMPARISON: None. FINDINGS: No displaced fracture is seen.  Joint spaces are well preserved.  There is no appreciable soft tissue swelling.  No radiopaque foreign body or soft tissue gas.  Mild vascular calcification.     Impression: No acute radiographic abnormality.     XR Chest 1 View    Result Date: 5/17/2023  Narrative: HISTORY: Dizziness and weakness. FINDINGS: No infiltrate, effusion,  or pneumothorax is seen.  Heart size and pulmonary vascularity are normal.  The lungs are clear.  The mediastinum, maria guadalupe, and visualized osseous structures are unremarkable.     Impression: No significant abnormality of the chest.     IR Insert Midline Without Port Pump 5 Plus    Result Date: 5/17/2023  Narrative: This procedure was auto-finalized with no dictation required.                                         MDM    Final diagnoses:   Sepsis without acute organ dysfunction, due to unspecified organism   Cellulitis of right lower extremity   Type 2 diabetes mellitus with hyperglycemia, unspecified whether long term insulin use       ED Disposition  ED Disposition     ED Disposition   Decision to Admit    Condition   --    Comment   Level of Care: Telemetry [5]   Admitting Physician: JUDY BARRETT [[id number]]   Attending Physician: JUDY BARRETT [[id number]]   Patient Class: Inpatient [101]               No follow-up provider specified.       Medication List      No changes were made to your prescriptions during this visit.          True Crocker MD  05/17/23 9174

## 2023-05-17 NOTE — SIGNIFICANT NOTE
05/17/23 1447   OTHER   Discipline occupational therapist;physical therapist   Rehab Time/Intention   Session Not Performed other (see comments)  (Pts OT/PT orders to start 5/18/23 at 0000. Will f/u as able.)   Recommendation   PT - Next Appointment 05/18/23   Recommendation   OT - Next Appointment 05/18/23

## 2023-05-17 NOTE — Clinical Note
Level of Care: Telemetry [5]   Diagnosis: Sepsis without acute organ dysfunction, due to unspecified organism [[id number]]   Admitting Physician: JUDY BARRETT [[id number]]   Attending Physician: JUDY BARRETT [[id number]]   Certification: I Certify That Inpatient Hospital Services Are Medically Necessary For Greater Than 2 Midnights

## 2023-05-17 NOTE — PROGRESS NOTES
TWO PATIENT IDENTIFIERS WERE USED. THE PATIENT WAS DRAPED WITH A FULL BODY DRAPE AND THE PATIENT'S RIGHT ARM WAS PREPPED WITH CHLORA PREP. ULTRASOUND WAS USED TO LOCALIZE THERIGHT CEPHALIC VEIN. SUBCUTANEOUS TISSUE AT THE CATHETER SITE WAS INFILTRATED WITH 2% LIDOCAINE. UNDER ULTRASOUND GUIDANCE, THE VEIN WAS ACCESSED WITH A 21 GAUGE  NEEDLE. AN 0.018 WIRE WAS THEN THREADED THROUGH THE NEEDLE. THE 21 GAUGE NEEDLE WAS REMOVED AND A 4 [place] SHEATH WAS PLACED OVER THE WIRE INTO THE VEIN.THE MIDLINE CATHETER WAS TRIMMED TO 15CM. THE MIDLINE CATHETER WAS THEN PLACED OVER THE WIRE INTO THE VEIN, THE SHEATH WAS PEELED AWAY, WIRE WAS REMOVED. CATHETER WAS FLUSHED WITH NORMAL SALINE AND CATHETER TIP APPLIED. BIOPATCH PLACED. CATHETER SECURED WITH STAT LOCK AND TEGADERM. PATIENT TOLERATED PROCEDURE WELL. THIS WAS DONE AT BEDSIDE      IMPRESSION:SUCCESSFUL PLACEMENT OF DUAL LUMEN MIDLINE.           Connie Villaseñor RN  5/17/2023  09:43 CDT

## 2023-05-18 LAB
ALBUMIN SERPL-MCNC: 2.7 G/DL (ref 3.5–5.2)
ALBUMIN/GLOB SERPL: 0.8 G/DL
ALP SERPL-CCNC: 84 U/L (ref 39–117)
ALT SERPL W P-5'-P-CCNC: 11 U/L (ref 1–33)
AMPHET+METHAMPHET UR QL: NEGATIVE
AMPHETAMINES UR QL: NEGATIVE
ANION GAP SERPL CALCULATED.3IONS-SCNC: 9 MMOL/L (ref 5–15)
AST SERPL-CCNC: 12 U/L (ref 1–32)
BARBITURATES UR QL SCN: NEGATIVE
BASOPHILS # BLD AUTO: 0.12 10*3/MM3 (ref 0–0.2)
BASOPHILS NFR BLD AUTO: 0.7 % (ref 0–1.5)
BENZODIAZ UR QL SCN: NEGATIVE
BILIRUB SERPL-MCNC: 0.6 MG/DL (ref 0–1.2)
BUN SERPL-MCNC: 15 MG/DL (ref 8–23)
BUN/CREAT SERPL: 13.6 (ref 7–25)
BUPRENORPHINE SERPL-MCNC: NEGATIVE NG/ML
CALCIUM SPEC-SCNC: 8.3 MG/DL (ref 8.6–10.5)
CANNABINOIDS SERPL QL: NEGATIVE
CHLORIDE SERPL-SCNC: 98 MMOL/L (ref 98–107)
CO2 SERPL-SCNC: 23 MMOL/L (ref 22–29)
COCAINE UR QL: NEGATIVE
CREAT SERPL-MCNC: 1.1 MG/DL (ref 0.57–1)
D-LACTATE SERPL-SCNC: 1.2 MMOL/L (ref 0.5–2)
DEPRECATED RDW RBC AUTO: 41.6 FL (ref 37–54)
EGFRCR SERPLBLD CKD-EPI 2021: 57.3 ML/MIN/1.73
EOSINOPHIL # BLD AUTO: 0.02 10*3/MM3 (ref 0–0.4)
EOSINOPHIL NFR BLD AUTO: 0.1 % (ref 0.3–6.2)
ERYTHROCYTE [DISTWIDTH] IN BLOOD BY AUTOMATED COUNT: 13.3 % (ref 12.3–15.4)
FENTANYL UR-MCNC: NEGATIVE NG/ML
GLOBULIN UR ELPH-MCNC: 3.2 GM/DL
GLUCOSE BLDC GLUCOMTR-MCNC: 191 MG/DL (ref 70–130)
GLUCOSE BLDC GLUCOMTR-MCNC: 200 MG/DL (ref 70–130)
GLUCOSE BLDC GLUCOMTR-MCNC: 229 MG/DL (ref 70–130)
GLUCOSE BLDC GLUCOMTR-MCNC: 306 MG/DL (ref 70–130)
GLUCOSE SERPL-MCNC: 247 MG/DL (ref 65–99)
HCT VFR BLD AUTO: 31.8 % (ref 34–46.6)
HGB BLD-MCNC: 10.7 G/DL (ref 12–15.9)
IMM GRANULOCYTES # BLD AUTO: 0.15 10*3/MM3 (ref 0–0.05)
IMM GRANULOCYTES NFR BLD AUTO: 0.9 % (ref 0–0.5)
LYMPHOCYTES # BLD AUTO: 0.99 10*3/MM3 (ref 0.7–3.1)
LYMPHOCYTES NFR BLD AUTO: 5.8 % (ref 19.6–45.3)
MCH RBC QN AUTO: 28.5 PG (ref 26.6–33)
MCHC RBC AUTO-ENTMCNC: 33.6 G/DL (ref 31.5–35.7)
MCV RBC AUTO: 84.8 FL (ref 79–97)
METHADONE UR QL SCN: NEGATIVE
MONOCYTES # BLD AUTO: 1.45 10*3/MM3 (ref 0.1–0.9)
MONOCYTES NFR BLD AUTO: 8.5 % (ref 5–12)
NEUTROPHILS NFR BLD AUTO: 14.26 10*3/MM3 (ref 1.7–7)
NEUTROPHILS NFR BLD AUTO: 84 % (ref 42.7–76)
NRBC BLD AUTO-RTO: 0 /100 WBC (ref 0–0.2)
OPIATES UR QL: NEGATIVE
OXYCODONE UR QL SCN: NEGATIVE
PCP UR QL SCN: NEGATIVE
PLATELET # BLD AUTO: 233 10*3/MM3 (ref 140–450)
PMV BLD AUTO: 9.6 FL (ref 6–12)
POTASSIUM SERPL-SCNC: 3.2 MMOL/L (ref 3.5–5.2)
POTASSIUM SERPL-SCNC: 4 MMOL/L (ref 3.5–5.2)
PROPOXYPH UR QL: NEGATIVE
PROT SERPL-MCNC: 5.9 G/DL (ref 6–8.5)
RBC # BLD AUTO: 3.75 10*6/MM3 (ref 3.77–5.28)
SODIUM SERPL-SCNC: 130 MMOL/L (ref 136–145)
TRICYCLICS UR QL SCN: NEGATIVE
WBC NRBC COR # BLD: 16.99 10*3/MM3 (ref 3.4–10.8)

## 2023-05-18 PROCEDURE — 25010000002 PIPERACILLIN SOD-TAZOBACTAM PER 1 G: Performed by: HOSPITALIST

## 2023-05-18 PROCEDURE — 63710000001 INSULIN ASPART PER 5 UNITS: Performed by: HOSPITALIST

## 2023-05-18 PROCEDURE — 84132 ASSAY OF SERUM POTASSIUM: CPT | Performed by: FAMILY MEDICINE

## 2023-05-18 PROCEDURE — 97162 PT EVAL MOD COMPLEX 30 MIN: CPT

## 2023-05-18 PROCEDURE — 63710000001 ONDANSETRON PER 8 MG: Performed by: HOSPITALIST

## 2023-05-18 PROCEDURE — 80053 COMPREHEN METABOLIC PANEL: CPT | Performed by: HOSPITALIST

## 2023-05-18 PROCEDURE — 97166 OT EVAL MOD COMPLEX 45 MIN: CPT

## 2023-05-18 PROCEDURE — 82948 REAGENT STRIP/BLOOD GLUCOSE: CPT

## 2023-05-18 PROCEDURE — 99213 OFFICE O/P EST LOW 20 MIN: CPT | Performed by: NURSE PRACTITIONER

## 2023-05-18 PROCEDURE — 25010000002 HEPARIN (PORCINE) PER 1000 UNITS: Performed by: HOSPITALIST

## 2023-05-18 PROCEDURE — 83605 ASSAY OF LACTIC ACID: CPT | Performed by: PHYSICIAN ASSISTANT

## 2023-05-18 PROCEDURE — 63710000001 INSULIN DETEMIR PER 5 UNITS: Performed by: HOSPITALIST

## 2023-05-18 PROCEDURE — 25010000002 VANCOMYCIN 10 G RECONSTITUTED SOLUTION: Performed by: HOSPITALIST

## 2023-05-18 PROCEDURE — 85025 COMPLETE CBC W/AUTO DIFF WBC: CPT | Performed by: HOSPITALIST

## 2023-05-18 RX ORDER — POTASSIUM CHLORIDE 1500 MG/1
40 TABLET, FILM COATED, EXTENDED RELEASE ORAL EVERY 4 HOURS
Status: COMPLETED | OUTPATIENT
Start: 2023-05-18 | End: 2023-05-18

## 2023-05-18 RX ORDER — POTASSIUM CHLORIDE 20 MEQ/1
40 TABLET, EXTENDED RELEASE ORAL EVERY 4 HOURS
Status: DISCONTINUED | OUTPATIENT
Start: 2023-05-18 | End: 2023-05-18

## 2023-05-18 RX ORDER — INSULIN GLARGINE 100 [IU]/ML
INJECTION, SOLUTION SUBCUTANEOUS
Refills: 7 | OUTPATIENT
Start: 2023-05-18

## 2023-05-18 RX ADMIN — ONDANSETRON HYDROCHLORIDE 4 MG: 4 TABLET, FILM COATED ORAL at 11:30

## 2023-05-18 RX ADMIN — VANCOMYCIN HYDROCHLORIDE 1250 MG: 10 INJECTION, POWDER, LYOPHILIZED, FOR SOLUTION INTRAVENOUS at 09:11

## 2023-05-18 RX ADMIN — Medication 10 ML: at 20:27

## 2023-05-18 RX ADMIN — SUCRALFATE 1 G: 1 TABLET ORAL at 08:49

## 2023-05-18 RX ADMIN — HEPARIN SODIUM 5000 UNITS: 5000 INJECTION INTRAVENOUS; SUBCUTANEOUS at 06:10

## 2023-05-18 RX ADMIN — ARIPIPRAZOLE 5 MG: 5 TABLET ORAL at 08:49

## 2023-05-18 RX ADMIN — INSULIN ASPART 5 UNITS: 100 INJECTION, SOLUTION INTRAVENOUS; SUBCUTANEOUS at 08:55

## 2023-05-18 RX ADMIN — PIPERACILLIN SODIUM AND TAZOBACTAM SODIUM 3.38 G: 3; .375 INJECTION, POWDER, LYOPHILIZED, FOR SOLUTION INTRAVENOUS at 17:18

## 2023-05-18 RX ADMIN — PANTOPRAZOLE SODIUM 40 MG: 40 TABLET, DELAYED RELEASE ORAL at 08:49

## 2023-05-18 RX ADMIN — FOLIC ACID 1000 MCG: 1 TABLET ORAL at 08:49

## 2023-05-18 RX ADMIN — GABAPENTIN 100 MG: 100 CAPSULE ORAL at 08:49

## 2023-05-18 RX ADMIN — RANOLAZINE 500 MG: 500 TABLET, FILM COATED, EXTENDED RELEASE ORAL at 08:49

## 2023-05-18 RX ADMIN — INSULIN DETEMIR 40 UNITS: 100 INJECTION, SOLUTION SUBCUTANEOUS at 21:17

## 2023-05-18 RX ADMIN — INSULIN ASPART 3 UNITS: 100 INJECTION, SOLUTION INTRAVENOUS; SUBCUTANEOUS at 17:22

## 2023-05-18 RX ADMIN — FUROSEMIDE 40 MG: 40 TABLET ORAL at 08:49

## 2023-05-18 RX ADMIN — PIPERACILLIN SODIUM AND TAZOBACTAM SODIUM 3.38 G: 3; .375 INJECTION, POWDER, LYOPHILIZED, FOR SOLUTION INTRAVENOUS at 03:01

## 2023-05-18 RX ADMIN — HYDROCODONE BITARTRATE AND ACETAMINOPHEN 1 TABLET: 7.5; 325 TABLET ORAL at 05:16

## 2023-05-18 RX ADMIN — RANOLAZINE 500 MG: 500 TABLET, FILM COATED, EXTENDED RELEASE ORAL at 20:27

## 2023-05-18 RX ADMIN — VANCOMYCIN HYDROCHLORIDE 1250 MG: 10 INJECTION, POWDER, LYOPHILIZED, FOR SOLUTION INTRAVENOUS at 22:03

## 2023-05-18 RX ADMIN — ISOSORBIDE MONONITRATE 30 MG: 30 TABLET, EXTENDED RELEASE ORAL at 08:49

## 2023-05-18 RX ADMIN — AMLODIPINE BESYLATE 5 MG: 5 TABLET ORAL at 08:49

## 2023-05-18 RX ADMIN — SUCRALFATE 1 G: 1 TABLET ORAL at 20:27

## 2023-05-18 RX ADMIN — SUCRALFATE 1 G: 1 TABLET ORAL at 11:30

## 2023-05-18 RX ADMIN — LEVOTHYROXINE SODIUM 50 MCG: 50 TABLET ORAL at 06:10

## 2023-05-18 RX ADMIN — CLOPIDOGREL BISULFATE 75 MG: 75 TABLET ORAL at 08:49

## 2023-05-18 RX ADMIN — PIPERACILLIN SODIUM AND TAZOBACTAM SODIUM 3.38 G: 3; .375 INJECTION, POWDER, LYOPHILIZED, FOR SOLUTION INTRAVENOUS at 08:50

## 2023-05-18 RX ADMIN — HEPARIN SODIUM 5000 UNITS: 5000 INJECTION INTRAVENOUS; SUBCUTANEOUS at 15:20

## 2023-05-18 RX ADMIN — SUCRALFATE 1 G: 1 TABLET ORAL at 17:15

## 2023-05-18 RX ADMIN — ACETAMINOPHEN 650 MG: 325 TABLET, FILM COATED ORAL at 03:04

## 2023-05-18 RX ADMIN — GABAPENTIN 100 MG: 100 CAPSULE ORAL at 20:27

## 2023-05-18 RX ADMIN — POTASSIUM CHLORIDE 40 MEQ: 20 TABLET, EXTENDED RELEASE ORAL at 17:15

## 2023-05-18 RX ADMIN — ASPIRIN 325 MG: 325 TABLET, COATED ORAL at 08:49

## 2023-05-18 RX ADMIN — HEPARIN SODIUM 5000 UNITS: 5000 INJECTION INTRAVENOUS; SUBCUTANEOUS at 20:27

## 2023-05-18 RX ADMIN — HYDROCODONE BITARTRATE AND ACETAMINOPHEN 1 TABLET: 7.5; 325 TABLET ORAL at 11:30

## 2023-05-18 RX ADMIN — INSULIN ASPART 5 UNITS: 100 INJECTION, SOLUTION INTRAVENOUS; SUBCUTANEOUS at 11:30

## 2023-05-18 RX ADMIN — HYDROCHLOROTHIAZIDE 12.5 MG: 12.5 TABLET ORAL at 08:49

## 2023-05-18 RX ADMIN — ATORVASTATIN CALCIUM 80 MG: 40 TABLET, FILM COATED ORAL at 08:49

## 2023-05-18 RX ADMIN — POTASSIUM CHLORIDE 40 MEQ: 20 TABLET, EXTENDED RELEASE ORAL at 11:43

## 2023-05-18 RX ADMIN — VENLAFAXINE HYDROCHLORIDE 75 MG: 75 CAPSULE, EXTENDED RELEASE ORAL at 08:49

## 2023-05-18 NOTE — PROGRESS NOTES
"Pharmacokinetics by Pharmacy - Vancomycin    Ariana Martinez is a 63 y.o. female receiving vancomycin day 2 for skin and soft tissue infection  Patient is also receiving piperacillin-tazobactam    Objective:  [Ht: 172.7 cm (68\"); Wt: 127 kg (280 lb)]     WBC   Date Value Ref Range Status   05/18/2023 16.99 (H) 3.40 - 10.80 10*3/mm3 Final   05/17/2023 19.65 (H) 3.40 - 10.80 10*3/mm3 Final      Lactate   Date Value Ref Range Status   05/18/2023 1.2 0.5 - 2.0 mmol/L Final   05/17/2023 1.7 0.5 - 2.0 mmol/L Final      Temp Readings from Last 1 Encounters:   05/18/23 98.7 °F (37.1 °C) (Temporal)     Estimated Creatinine Clearance: 75.5 mL/min (A) (by C-G formula based on SCr of 1.1 mg/dL (H)).   Creatinine   Date Value Ref Range Status   05/18/2023 1.10 (H) 0.57 - 1.00 mg/dL Final   05/17/2023 1.26 (H) 0.57 - 1.00 mg/dL Final       No results found for: VANCOPEAK, VANCRODERICK, VANCJAYYOM    Culture Results:  Microbiology Results (last 10 days)     Procedure Component Value - Date/Time    COVID-19 and FLU A/B PCR - Swab, Nasopharynx [[id number]]  (Normal) Collected: 05/17/23 0830    Lab Status: Final result Specimen: Swab from Nasopharynx Updated: 05/17/23 0908     COVID19 Not Detected     Influenza A PCR Not Detected     Influenza B PCR Not Detected    Narrative:      Fact sheet for providers: https://www.fda.gov/media/630599/download    Fact sheet for patients: https://www.fda.gov/media/611479/download    Test performed by PCR.        No results found for: RESPCX    Assessment:    WBC 16.99, elevated but trending down   Scr 1.1  Patient afebrile     5/17 blood culture x2: in process    Vancomycin trough and AUC goals 10-20 and 400-600, respectively.    Plan:  1. Continue vancomycin 1250mg IV Q12H. Consult ends 5/24/23.  2. Will order vancomycin peak on 5/19 at 0030 and vancomycin trough on 5/19 at 0930.  3. Pharmacy will monitor renal function and adjust dose accordingly.    Julián Carreon [facility]   05/18/23 08:37 CDT  "

## 2023-05-18 NOTE — PLAN OF CARE
Goal Outcome Evaluation:  Plan of Care Reviewed With: patient      OT eval on this date as co-eval with PT.  Pt required mod A fo 2 for bed mobility.  Pt unable to stand due to not having R LE prosthesis.  Pt was dependent with LB dressing although she states she was independent prior.  Pt was CGA for self feeding and grooming.  She could benefit from OT services to increase independence with ADLs and functional mobility.              Evaluation Complexity (OT)  Review Occupational Profile/Medical/Therapy History Complexity: expanded/moderate complexity  Assessment, Occupational Performance/Identification of Deficit Complexity: 3-5 performance deficits  Clinical Decision Making Complexity (OT): detailed assessment/moderate complexity  Overall Complexity of Evaluation (OT): moderate complexity

## 2023-05-18 NOTE — PLAN OF CARE
Goal Outcome Evaluation:      Pt resting in bed at this time, no falls noted, no new skin issues noted, continues with antibiotic therapy with no adse noted, edema noted to AMIE hands Dr. Baker made aware with new order to stop IVFs

## 2023-05-18 NOTE — TELEPHONE ENCOUNTER
Requested Prescriptions     Refused Prescriptions Disp Refills   • Insulin Glargine (BASAGLAR KWIKPEN) 100 UNIT/ML injection pen [Pharmacy Med Name: BASAGLAR 100 UNIT/ML KWIKPEN]  7     Sig: INJECT 24 UNITS SUBCUTANEOUSLY AT BEDTIME     Refused By: KATE LIU     Reason for Refusal: Patient no longer under prescriber care     Patient is currently admitted in the hospital.

## 2023-05-18 NOTE — THERAPY EVALUATION
Acute Care - Occupational Therapy Initial Evaluation  [facility]     Patient Name: Ariana Martinez  : 1960  MRN: [id number]  Today's Date: 2023     Date of Referral to OT: 23       Admit Date: 2023       ICD-10-CM ICD-9-CM   1. Sepsis without acute organ dysfunction, due to unspecified organism  A41.9 038.9     995.91   2. Cellulitis of right lower extremity  L03.115 682.6   3. Type 2 diabetes mellitus with hyperglycemia, unspecified whether long term insulin use  E11.65 250.00   4. Impaired mobility and activities of daily living  Z74.09 V49.89    Z78.9      Patient Active Problem List   Diagnosis   • 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, with long-term current use of insulin   • Closed nondisplaced fracture of fifth left metatarsal bone   • MAURICIO (generalized anxiety disorder)   • Depression, major, recurrent, moderate (HCC)   • GERD without esophagitis   • Long term prescription opiate use   • Mixed hyperlipidemia   • Vitamin D deficiency   • Seasonal allergic rhinitis   • Restrictive lung disease secondary to obesity   • Adult body mass index 37.0-37.9   • Snoring   • Class 3 severe obesity due to excess calories without serious comorbidity with body mass index (BMI) of 40.0 to 44.9 in adult (HCC)   • (HFpEF) heart failure with preserved ejection fraction   • Type 2 diabetes mellitus with hyperglycemia, with long-term current use of insulin (HCC)   • Cyanocobalamin deficiency   • Coronary artery disease of native artery of native heart with stable angina pectoris   • Hypertension   • Meniere's disease   • Gastroparesis   • Pulmonary hypertension (HCC)   • Pes cavus   • Primary osteoarthritis involving multiple joints   • Generalized anxiety disorder   • Chronic right-sided low back pain with right-sided sciatica   • Chest pain   • Adverse effect of iron   • Chest pain due to myocardial ischemia   • Nonrheumatic tricuspid valve regurgitation   • Iron  deficiency anemia due to chronic blood loss   • Malaise and fatigue   • Ankle arthritis   • Urinary retention   • Endocarditis   • Essential hypertension   • Occlusion and stenosis of bilateral carotid arteries   • S/P BKA (below knee amputation) unilateral, left (HCC)   • Phantom pain after amputation of lower extremity   • S/P CABG (coronary artery bypass graft)   • Severe malnutrition   • TIA (transient ischemic attack)   • Coronary artery abnormality   • Elevated d-dimer   • Neuropathy   • Chest pain, unspecified type   • Acute pain of right shoulder   • Rotator cuff syndrome, right   • Acquired hypothyroidism   • Bilateral leg edema   • Left elbow pain   • Gastritis   • Olecranon bursitis, left elbow   • Sepsis without acute organ dysfunction, due to unspecified organism     Past Medical History:   Diagnosis Date   • Acute bacterial endocarditis 3/13/2021   • Angina, class IV    • Anxiety    • Anxiety and depression    • Arthritis    • Benign paroxysmal positional vertigo    • Bladder disorder     has bladder stimulator   • Carpal tunnel syndrome    • CHF (congestive heart failure)    • Chronic pain    • Coronary atherosclerosis     hx CABG 2005.  is followed by Dr Kwon   • Depression    • Diabetes mellitus     Type 2, controlled   • Diabetic polyneuropathy    • Disease of thyroid gland    • Elevated cholesterol    • Female stress incontinence    • Foot pain, left    • Full dentures    • Gastroparesis    • GERD (gastroesophageal reflux disease)    • Hyperlipidemia    • Hypertension    • Low back pain    • Malaise and fatigue    • Multiple joint pain    • Obesity     Refuses to be weighed   • Occlusion and stenosis of bilateral carotid arteries 6/18/2021   • Otalgia     Both   • Perforation of tympanic membrane     Left   • Postoperative wound infection    • Vitamin D deficiency    • Wears glasses     reading     Past Surgical History:   Procedure Laterality Date   • ABDOMINAL SURGERY     • AMPUTATION FOOT      • ANGIOPLASTY      coronary   • ANKLE ARTHROSCOPY Left 02/26/2021    Procedure: Left foot hardware removal, ankle arthroscopy, bone grafting , left foot exostectomy;  Surgeon: Ignacio Lord DPM;  Location: [facility] OR;  Service: Podiatry;  Laterality: Left;   • BREAST BIOPSY Right    • CARDIAC CATHETERIZATION     • CARDIAC CATHETERIZATION N/A 06/20/2017    Procedure: Right Heart Cath;  Surgeon: Can Kwon MD PhD;  Location: [facility] CATH INVASIVE LOCATION;  Service:    • CARDIAC CATHETERIZATION N/A 02/18/2020    Procedure: Left Heart Cath;  Surgeon: Catalina Cooper MD;  Location: [facility] CATH INVASIVE LOCATION;  Service: Cardiology;  Laterality: N/A;   • CARDIAC CATHETERIZATION N/A 04/06/2022    Procedure: Left Heart Cath;  Surgeon: Sheryl Navas MD;  Location: [facility] CATH INVASIVE LOCATION;  Service: Cardiology;  Laterality: N/A;   • CARPAL TUNNEL RELEASE     • CHOLECYSTECTOMY     • COLONOSCOPY N/A 06/24/2020    Procedure: COLONOSCOPY;  Surgeon: Julián Maldonado MD;  Location: [facility] ENDOSCOPY;  Service: Gastroenterology;  Laterality: N/A;   • CORONARY ARTERY BYPASS GRAFT  2005   • ENDOSCOPY N/A 10/19/2018    Procedure: ESOPHAGOGASTRODUODENOSCOPY possible dilation;  Surgeon: Julián Maldonado MD;  Location: [facility] ENDOSCOPY;  Service: Gastroenterology   • ENDOSCOPY N/A 06/24/2020    Procedure: ESOPHAGOGASTRODUODENOSCOPY WED appt please;  Surgeon: Julián Maldonado MD;  Location: [facility] ENDOSCOPY;  Service: Gastroenterology;  Laterality: N/A;   • ENDOSCOPY N/A 06/10/2022    Procedure: ESOPHAGOGASTRODUODENOSCOPY   room 380;  Surgeon: Jeremiah Wilkins MD;  Location: [facility] ENDOSCOPY;  Service: Gastroenterology;  Laterality: N/A;   • ENDOSCOPY N/A 1/10/2023    Procedure: ESOPHAGOGASTRODUODENOSCOPY;  Surgeon: Jeremiah Wilkins MD;  Location: [facility] ENDOSCOPY;  Service: Gastroenterology;  Laterality: N/A;   • ENDOSCOPY AND COLONOSCOPY     • FOOT SURGERY      Toes   • FOOT SURGERY     • GASTRIC  BANDING      Revision, laparoscopic   • HYSTERECTOMY     • INCISION AND DRAINAGE LEG Left 03/12/2021    Procedure: Left ankle arthroscopic irrigation and debridement, screw removal;  Surgeon: Ignacio Lord DPM;  Location: [facility];  Service: Podiatry;  Laterality: Left;   • MOUTH SURGERY     • SALPINGO OOPHORECTOMY     • SHOULDER SURGERY     • SUBTALAR ARTHRODESIS Left 01/16/2019    Procedure: LEFT FOOT HARDWARE REMOVAL, FIFTH METATARSAL , OPEN REDUCTION INTERNAL FIXATION, CALCANEAL OSTEOTOMY;  Surgeon: Ignacio Lord DPM;  Location: [facility];  Service: Podiatry   • SUBTALAR ARTHRODESIS Left 10/16/2019    Procedure: foot hardware removal, subtalar joint fusion  possible de/reattachment of achilles tendon        (c-arm);  Surgeon: Ignacio Lord DPM;  Location: [facility];  Service: Podiatry   • SUBTALAR ARTHRODESIS Left 09/30/2020    Procedure: subtalar, talonavicular joint arthrodesis.  Removal hardware.          (c-arm);  Surgeon: Ignacio Lord DPM;  Location: [facility];  Service: Podiatry;  Laterality: Left;   • TRANSESOPHAGEAL ECHOCARDIOGRAM (LAMONTE)      With color flow         OT ASSESSMENT FLOWSHEET (last 12 hours)     OT Evaluation and Treatment     Row Name 05/18/23 1111                   OT Time and Intention    Subjective Information complains of;pain;nausea/vomiting  -RB        Document Type evaluation  -RB        Mode of Treatment co-treatment;physical therapy;occupational therapy  -RB        Patient Effort adequate  -RB           General Information    Patient Profile Reviewed yes  -RB        Equipment Currently Used at Home raised toilet;ramp;walker, rolling;wheelchair;grab bar;prosthesis;shower chair  -RB        Existing Precautions/Restrictions fall;oxygen therapy device and L/min  -RB        Barriers to Rehab medically complex  -RB           Living Environment    Home Accessibility wheelchair accessible  -RB        People in Home spouse  -RB           Home Main Entrance     Number of Stairs, Main Entrance other (see comments)  Ramp with 2 rails.  -RB           Stairs Within Home, Primary    Number of Stairs, Within Home, Primary none  -RB           Home Use of Assistive/Adaptive Equipment    Equipment Currently Used at Home walker, rolling;wheelchair;ramp;grab bar;shower chair  -RB           Pain Assessment    Pretreatment Pain Rating 10/10  -RB        Posttreatment Pain Rating 8/10  -RB        Pain Location - Side/Orientation Bilateral  -RB        Pain Location lower  -RB        Pain Location - extremity  -RB        Pain Intervention(s) Medication (See MAR)  -RB           Cognition    Orientation Status (Cognition) oriented x 4  -RB           Range of Motion Comprehensive    General Range of Motion bilateral upper extremity ROM WFL  -RB           Strength Comprehensive (MMT)    General Manual Muscle Testing (MMT) Assessment other (see comments)  -RB        Comment, General Manual Muscle Testing (MMT) Assessment B UE strength was 4 to 4+/5 grossly.  -RB           Sensory Assessment (Somatosensory)    Sensory Assessment (Somatosensory) UE sensation intact  -RB           Activities of Daily Living    BADL Assessment/Intervention lower body dressing;grooming;feeding  -RB           Lower Body Dressing Assessment/Training    [place] Level (Lower Body Dressing) lower body dressing skills;don;socks;dependent (less than 25% patient effort)  -RB        Position (Lower Body Dressing) edge of bed sitting  -RB           Grooming Assessment/Training    [place] Level (Grooming) hair care, combing/brushing;contact guard assist  -RB        Position (Grooming) edge of bed sitting  -RB           Self-Feeding Assessment/Training    [place] Level (Feeding) feeding skills;scoop food and bring to mouth;contact guard assist  -RB        Position (Self-Feeding) sitting up in bed  -RB           Bed Mobility    Bed Mobility supine-sit;sit-supine  -RB        Supine-Sit [place] (Bed Mobility)  moderate assist (50% patient effort);2 person assist  -RB        Sit-Supine [place] (Bed Mobility) moderate assist (50% patient effort);2 person assist  -RB           Functional Mobility    Functional Mobility- Ind. Level not tested  -RB           Balance    Static Sitting Balance independent  -RB        Dynamic Sitting Balance standby assist  -RB        Position, Sitting Balance sitting edge of bed  -RB           Wound 05/17/23 1828 Right anterior fifth toe    Wound - Properties Group Placement Date: 05/17/23 - Placement Time: 1828 - Side: Right  - Orientation: anterior  -JH Location: fifth toe  -JH    Retired Wound - Properties Group Placement Date: 05/17/23  - Placement Time: 1828 - Side: Right  -JH Orientation: anterior  -JH Location: fifth toe  -JH    Retired Wound - Properties Group Date first assessed: 05/17/23 - Time first assessed: 1828 - Side: Right  -JH Location: fifth toe  -JH       Plan of Care Review    Plan of Care Reviewed With patient  -RB           Vital Signs    Pre Systolic BP Rehab 118  -RB        Pre Treatment Diastolic BP 60  -RB        Post Systolic BP Rehab 131  -RB        Post Treatment Diastolic BP 62  -RB        Pretreatment Heart Rate (beats/min) 80  -RB        Intratreatment Heart Rate (beats/min) 81  -RB        Posttreatment Heart Rate (beats/min) 83  -RB        Pre SpO2 (%) 93  -RB        O2 Delivery Pre Treatment supplemental O2  3 L  -RB        Intra SpO2 (%) 87  -RB        O2 Delivery Intra Treatment supplemental O2  -RB        Post SpO2 (%) 93  -RB        O2 Delivery Post Treatment supplemental O2  -RB        Pre Patient Position Supine  -RB        Intra Patient Position Sitting  -RB        Post Patient Position Supine  -RB           Positioning and Restraints    Pre-Treatment Position in bed  -RB        Post Treatment Position bed  -RB        In Bed supine;call light within reach;encouraged to call for assist;exit alarm on  -RB           Therapy  Assessment/Plan (OT)    Date of Referral to OT 05/18/23  -RB        Functional Level at Time of Evaluation (OT) Imp mob and ADLs.  -RB        OT Diagnosis Imp mob and ADLs.  -RB        Rehab Potential (OT) fair, will monitor progress closely  -RB        Criteria for Skilled Therapeutic Interventions Met (OT) yes;meets criteria  -RB        Therapy Frequency (OT) other (see comments)  5-7 days/wk  -RB        Predicted Duration of Therapy Intervention (OT) Until D/C or goals met.  -RB        Problem List (OT) problems related to;balance;coordination;mobility;strength;pain;inability to direct their own care  -RB        Activity Limitations Related to Problem List (OT) unable to ambulate safely;unable to transfer safely;BADLs not performed adequately or safely;IADLs not performed adequately or safely  -RB        Planned Therapy Interventions (OT) activity tolerance training;adaptive equipment training;BADL retraining;functional balance retraining;occupation/activity based interventions;patient/caregiver education/training;strengthening exercise;ROM/therapeutic exercise;transfer/mobility retraining  -RB           Evaluation Complexity (OT)    Review Occupational Profile/Medical/Therapy History Complexity expanded/moderate complexity  -RB        Assessment, Occupational Performance/Identification of Deficit Complexity 3-5 performance deficits  -RB        Clinical Decision Making Complexity (OT) detailed assessment/moderate complexity  -RB        Overall Complexity of Evaluation (OT) moderate complexity  -RB           Therapy Plan Review/Discharge Plan (OT)    Anticipated Discharge Disposition (OT) home with assist;home with 24/7 care  -RB           OT Goals    Transfer Goal Selection (OT) transfer, OT goal 1  -RB        Bathing Goal Selection (OT) bathing, OT goal 1  -RB        Dressing Goal Selection (OT) dressing, OT goal 1  -RB        Toileting Goal Selection (OT) toileting, OT goal 1  -RB           Transfer Goal 1 (OT)     Activity/Assistive Device (Transfer Goal 1, OT) sit-to-stand/stand-to-sit;toilet;wheelchair transfer;bed-to-chair/chair-to-bed  -RB        [place] Level/Cues Needed (Transfer Goal 1, OT) contact guard required  -RB        Time Frame (Transfer Goal 1, OT) long term goal (LTG)  -RB           Bathing Goal 1 (OT)    Activity/Device (Bathing Goal 1, OT) bathing skills, all  -RB        [place] Level/Cues Needed (Bathing Goal 1, OT) contact guard required  -RB        Time Frame (Bathing Goal 1, OT) long term goal (LTG)  -RB        Progress/Outcomes (Bathing Goal 1, OT) goal not met  -RB           Dressing Goal 1 (OT)    Activity/Device (Dressing Goal 1, OT) dressing skills, all  -RB        [place]/Cues Needed (Dressing Goal 1, OT) contact guard required  -RB        Time Frame (Dressing Goal 1, OT) long term goal (LTG)  -RB        Progress/Outcome (Dressing Goal 1, OT) goal not met  -RB           Toileting Goal 1 (OT)    Activity/Device (Toileting Goal 1, OT) toileting skills, all  -RB        [place] Level/Cues Needed (Toileting Goal 1, OT) minimum assist (75% or more patient effort)  -RB        Time Frame (Toileting Goal 1, OT) long term goal (LTG)  -RB        Progress/Outcome (Toileting Goal 1, OT) goal not met  -RB              User Key  (r) = Recorded By, (t) = Taken By, (c) = Cosigned By    Initials Name Effective Dates    RB Fredi France OT 06/16/21 -     Janae Severino LPN 09/08/22 -                  Occupational Therapy Education     Title: PT OT SLP Therapies (In Progress)     Topic: Occupational Therapy (In Progress)     Point: ADL training (Not Started)     Description:   Instruct learner(s) on proper safety adaptation and remediation techniques during self care or transfers.   Instruct in proper use of assistive devices.              Learner Progress:  Not documented in this visit.          Point: Home exercise program (Not Started)     Description:   Instruct learner(s) on  appropriate technique for monitoring, assisting and/or progressing therapeutic exercises/activities.              Learner Progress:  Not documented in this visit.          Point: Precautions (Done)     Description:   Instruct learner(s) on prescribed precautions during self-care and functional transfers.              Learning Progress Summary           Patient Acceptance, ANN, DU by BALTA at 5/18/2023 6504    Comment: Pt edu on use of gait belt and non skid socks when OOB and no OOB without assist.                   Point: Body mechanics (Not Started)     Description:   Instruct learner(s) on proper positioning and spine alignment during self-care, functional mobility activities and/or exercises.              Learner Progress:  Not documented in this visit.                      User Key     Initials Effective Dates Name Provider Type Discipline     06/16/21 -  Fredi France, OT Occupational Therapist OT                  OT Recommendation and Plan  Planned Therapy Interventions (OT): activity tolerance training, adaptive equipment training, BADL retraining, functional balance retraining, occupation/activity based interventions, patient/caregiver education/training, strengthening exercise, ROM/therapeutic exercise, transfer/mobility retraining  Therapy Frequency (OT): other (see comments) (5-7 days/wk)  Plan of Care Review  Plan of Care Reviewed With: patient  Plan of Care Reviewed With: patient     Outcome Measures     Row Name 05/18/23 1111             How much help from another is currently needed...    Putting on and taking off regular lower body clothing? 2  -RB      Bathing (including washing, rinsing, and drying) 2  -RB      Toileting (which includes using toilet bed pan or urinal) 2  -RB      Putting on and taking off regular upper body clothing 2  -RB      Taking care of personal grooming (such as brushing teeth) 3  -RB      Eating meals 3  -RB      AM-PAC 6 Clicks Score (OT) 14  -RB         Functional  Assessment    Outcome Measure Options AM-PAC 6 Clicks Daily Activity (OT)  -RB            User Key  (r) = Recorded By, (t) = Taken By, (c) = Cosigned By    Initials Name Provider Type    Fredi Overton OT Occupational Therapist                Time Calculation:    Time Calculation- OT     Row Name 05/18/23 1359             Time Calculation- OT    OT Start Time 1111  -RB      OT Stop Time 1207  -RB      OT Time Calculation (min) 56 min  -RB      OT Received On 05/18/23  -RB      OT Goal Re-Cert Due Date 05/31/23  -RB            User Key  (r) = Recorded By, (t) = Taken By, (c) = Cosigned By    Initials Name Provider Type    Fredi Overton OT Occupational Therapist              Therapy Charges for Today     Code Description Service Date Service Provider Modifiers Qty    [id number]  OT EVAL MOD COMPLEXITY 4 5/18/2023 Fredi France OT GO 1               Fredi France OT  5/18/2023

## 2023-05-18 NOTE — PROGRESS NOTES
[facility] Medicine Services  INPATIENT PROGRESS NOTE    Length of Stay: 1  Date of Admission: 5/17/2023  Primary Care Physician: Dolly Foss APRN    Subjective   Chief Complaint: Right lower extremity pain  HPI:    She has been admitted for sepsis and cellulitis and right lower extremity.  Doing stable right now reports pain has been controlled.    Review of Systems   Respiratory: Negative for shortness of breath.    Cardiovascular: Negative for chest pain.        All pertinent negatives and positives are as above. All other systems have been reviewed and are negative unless otherwise stated.     Objective    Temp:  [97.1 °F (36.2 °C)-98.7 °F (37.1 °C)] 97.1 °F (36.2 °C)  Heart Rate:  [78-94] 80  Resp:  [16-20] 20  BP: (118-146)/(58-67) 123/58  Physical Exam  Vitals and nursing note reviewed.   Constitutional:       General: She is not in acute distress.     Appearance: She is well-developed. She is not diaphoretic.   HENT:      Head: Normocephalic and atraumatic.   Cardiovascular:      Rate and Rhythm: Normal rate.   Pulmonary:      Effort: Pulmonary effort is normal. No respiratory distress.      Breath sounds: No wheezing.   Abdominal:      General: There is no distension.      Palpations: Abdomen is soft.   Musculoskeletal:         General: Normal range of motion.   Skin:     General: Skin is warm and dry.   Neurological:      Mental Status: She is alert.      Cranial Nerves: No cranial nerve deficit.   Psychiatric:         Behavior: Behavior normal.         Thought Content: Thought content normal.         Judgment: Judgment normal.             Results Review:  I have reviewed the labs, radiology results, and diagnostic studies.    Laboratory Data:   Lab Results (last 24 hours)     Procedure Component Value Units Date/Time    Urine Drug Screen - Straight Cath [[id number]]  (Normal) Collected: 05/17/23 1040    Specimen: Urine from Straight Cath Updated: 05/18/23 9510      THC, Screen, Urine Negative     Phencyclidine (PCP), Urine Negative     Cocaine Screen, Urine Negative     Methamphetamine, Ur Negative     Opiate Screen Negative     Amphetamine Screen, Urine Negative     Benzodiazepine Screen, Urine Negative     Tricyclic Antidepressants Screen Negative     Methadone Screen, Urine Negative     Barbiturates Screen, Urine Negative     Oxycodone Screen, Urine Negative     Propoxyphene Screen Negative     Buprenorphine, Screen, Urine Negative    Narrative:      Cutoff For Drugs Screened:    Amphetamines               500 ng/ml  Barbiturates               200 ng/ml  Benzodiazepines            150 ng/ml  Cocaine                    150 ng/ml  Methadone                  200 ng/ml  Opiates                    100 ng/ml  Phencyclidine               25 ng/ml  THC                            50 ng/ml  Methamphetamine            500 ng/ml  Tricyclic Antidepressants  300 ng/ml  Oxycodone                  100 ng/ml  Propoxyphene               300 ng/ml  Buprenorphine               10 ng/ml    The normal value for all drugs tested is negative. This report includes unconfirmed screening results, with the cutoff values listed, to be used for medical treatment purposes only.  Unconfirmed results must not be used for non-medical purposes such as employment or legal testing.  Clinical consideration should be applied to any drug of abuse test, particularly when unconfirmed results are used.      POC Glucose Once [[id number]]  (Abnormal) Collected: 05/18/23 1614    Specimen: Blood Updated: 05/18/23 1649     Glucose 191 mg/dL      Comment: Result Not ConfirmedOperator: [id number] JESSENIAARACELI [facility] ID: [id number]       Fentanyl, Urine - Straight Cath [[id number]]  (Normal) Collected: 05/17/23 1040    Specimen: Urine from Straight Cath Updated: 05/18/23 1548     Fentanyl, Urine Negative    Narrative:      Negative Threshold:      Fentanyl 5 ng/mL     The normal value for the drug tested is negative.  This report includes final unconfirmed screening results to be used for medical treatment purposes only. Unconfirmed results must not be used for non-medical purposes such as employment or legal testing. Clinical consideration should be applied to any drug of abuse test, particularly when unconfirmed results are used.           Blood Culture - Blood, Hand, Left [[id number]]  (Normal) Collected: 05/17/23 1151    Specimen: Blood from Hand, Left Updated: 05/18/23 1216     Blood Culture No growth at 24 hours    POC Glucose Once [[id number]]  (Abnormal) Collected: 05/18/23 1041    Specimen: Blood Updated: 05/18/23 1106     Glucose 200 mg/dL      Comment: RN NotifiedOperator: [id number] [employer] ID: [id number]       Blood Culture - Blood, Arm, Right [[id number]]  (Normal) Collected: 05/17/23 0954    Specimen: Blood from Arm, Right Updated: 05/18/23 1015     Blood Culture No growth at 24 hours    POC Glucose Once [[id number]]  (Abnormal) Collected: 05/18/23 0653    Specimen: Blood Updated: 05/18/23 0723     Glucose 229 mg/dL      Comment: RN NotifiedOperator: [id number] [employer]IAZ[employer]Meter ID: [id number]       Comprehensive Metabolic Panel [[id number]]  (Abnormal) Collected: 05/18/23 0556    Specimen: Blood Updated: 05/18/23 0622     Glucose 247 mg/dL      BUN 15 mg/dL      Creatinine 1.10 mg/dL      Sodium 130 mmol/L      Potassium 3.2 mmol/L      Chloride 98 mmol/L      CO2 23.0 mmol/L      Calcium 8.3 mg/dL      Total Protein 5.9 g/dL      Albumin 2.7 g/dL      ALT (SGPT) 11 U/L      AST (SGOT) 12 U/L      Alkaline Phosphatase 84 U/L      Total Bilirubin 0.6 mg/dL      Globulin 3.2 gm/dL      A/G Ratio 0.8 g/dL      BUN/Creatinine Ratio 13.6     Anion Gap 9.0 mmol/L      eGFR 57.3 mL/min/1.73     Narrative:      GFR Normal >60  Chronic Kidney Disease <60  Kidney Failure <15      CBC Auto Differential [[id number]]  (Abnormal) Collected: 05/18/23 0556    Specimen: Blood Updated: 05/18/23 0601     WBC 16.99  10*3/mm3      RBC 3.75 10*6/mm3      Hemoglobin 10.7 g/dL      Hematocrit 31.8 %      MCV 84.8 fL      MCH 28.5 pg      MCHC 33.6 g/dL      RDW 13.3 %      RDW-SD 41.6 fl      MPV 9.6 fL      Platelets 233 10*3/mm3      Neutrophil % 84.0 %      Lymphocyte % 5.8 %      Monocyte % 8.5 %      Eosinophil % 0.1 %      Basophil % 0.7 %      Immature Grans % 0.9 %      Neutrophils, Absolute 14.26 10*3/mm3      Lymphocytes, Absolute 0.99 10*3/mm3      Monocytes, Absolute 1.45 10*3/mm3      Eosinophils, Absolute 0.02 10*3/mm3      Basophils, Absolute 0.12 10*3/mm3      Immature Grans, Absolute 0.15 10*3/mm3      nRBC 0.0 /100 WBC     Lactic Acid, Plasma [[id number]]  (Normal) Collected: 05/18/23 0000    Specimen: Blood Updated: 05/18/23 0019     Lactate 1.2 mmol/L     POC Glucose Once [[id number]]  (Abnormal) Collected: 05/17/23 1924    Specimen: Blood Updated: 05/17/23 2011     Glucose 295 mg/dL      Comment: RN NotifiedOperator: [id number] COLETTEELIANE [facility] ID: [id number]             Culture Data:   Blood Culture   Date Value Ref Range Status   05/17/2023 No growth at 24 hours  Preliminary   05/17/2023 No growth at 24 hours  Preliminary     No results found for: URINECX  No results found for: RESPCX  No results found for: WOUNDCX  No results found for: STOOLCX  No components found for: BODYFLD    Radiology Data:   Imaging Results (Last 24 Hours)     ** No results found for the last 24 hours. **          I have reviewed the patient's current medications.     Assessment/Plan     Active Hospital Problems    Diagnosis    • **Sepsis without acute organ dysfunction, due to unspecified organism      Right lower extremity cellulitis-continue with IV antibiotics including Zosyn and vancomycin, ultrasound is negative for DVT    Hypertension-continue with amlodipine and continue with blood pressure monitoring    Coronary artery disease-continue with atorvastatin    Hyperlipidemia-continue with Lipitor    Type 2  diabetes-continue with sliding scale insulin    GERD-continue with Protonix    DVT prophylaxis-heparin      Medical Decision Making  Number and Complexity of problems: 1 major complex  Differential Diagnosis: Sepsis    Conditions and Status:        Condition is improving.     [facility] Data  External documents reviewed: Previous medical records            Treatment Plan  As above    Care Planning  Shared decision making: Patient  Code status and discussions: Full code    Disposition  Social Determinants of Health that impact treatment or disposition: None  I expect the patient to be discharged to home in 1-2 days.      I have utilized all available immediate resources to obtain, update, or review the patient's current medications (including all prescriptions, over-the-counter products, herbals, cannabis/cannabidiol products, and vitamin/mineral/dietary (nutritional) supplements).     I confirmed that the patient's Advance Care Plan is present, code status is documented, or surrogate decision maker is listed in the patient's medical record.         Doni Baker MD   05/18/23   18:18 CDT

## 2023-05-18 NOTE — PLAN OF CARE
Goal Outcome Evaluation:  Plan of Care Reviewed With: patient           Outcome Evaluation: PT evaluation completed as co-eval with OT. Pt in fowlers upon arrival complains of 10/10 pain in legs and later nausea(RN notified and gave meds for both). Patient agreed to bed and EOB eval. Reported she could not stand this visit due to pain and did not have prothesis  for LLE at the hospital. Patient transferred supine to sit and sit to supine with moderate assistance. Patient sat EOB 15 minutes with SBA for sitting balance. Function limited by decreased strength, balance,and tolerance for functional mobility and activities. Pt will benefit from PT to regain lost function as pt improves medically. Anticipate home with assist at discharge with HHPT.

## 2023-05-18 NOTE — PROGRESS NOTES
[facility]  INPATIENT WOUND & OSTOMY CARE    PROGRESS NOTE    Today's Date: 05/18/23    Patient Name: Ariana Martinez  MRN: [id number]  CSN: [id number]  PCP: Dolly Foss APRN  Referring Provider: Mahin Esteves MD  Attending Provider: Mahin Esteves MD  Length of Stay: 1    SUBJECTIVE   Chief Complaint: wounds    HPI: Ariana Martinez is a 63-year-old female was consulted on for wound care/assessment.  Patient has history of hypertension and diabetes.  No updated vascular studies available for review at this time.  Patient has wound to right fifth toe measuring 0.5 cm x 0.5 cm.  Wound is not open at this time and is dry and stable.  Patient also has wound to right second toe measuring 0.5 cm x 0.5 cm of unstageable eschar.  Patient also has ulcer noted underneath this area measuring 1 cm x 1 cm that is open and draining.  All wounds present on admission.      Visit Dx:    ICD-10-CM ICD-9-CM   1. Sepsis without acute organ dysfunction, due to unspecified organism  A41.9 038.9     995.91   2. Cellulitis of right lower extremity  L03.115 682.6   3. Type 2 diabetes mellitus with hyperglycemia, unspecified whether long term insulin use  E11.65 250.00   4. Impaired mobility and activities of daily living  Z74.09 V49.89    Z78.9    5. Impaired physical mobility  Z74.09 781.99       Hospital Problem List:     Sepsis without acute organ dysfunction, due to unspecified organism      History:   Past Medical History:   Diagnosis Date   • Acute bacterial endocarditis 3/13/2021   • Angina, class IV    • Anxiety    • Anxiety and depression    • Arthritis    • Benign paroxysmal positional vertigo    • Bladder disorder     has bladder stimulator   • Carpal tunnel syndrome    • CHF (congestive heart failure)    • Chronic pain    • Coronary atherosclerosis     hx CABG 2005.  is followed by Dr Kwon   • Depression    • Diabetes mellitus     Type 2, controlled   • Diabetic polyneuropathy    •  Disease of thyroid gland    • Elevated cholesterol    • Female stress incontinence    • Foot pain, left    • Full dentures    • Gastroparesis    • GERD (gastroesophageal reflux disease)    • Hyperlipidemia    • Hypertension    • Low back pain    • Malaise and fatigue    • Multiple joint pain    • Obesity     Refuses to be weighed   • Occlusion and stenosis of bilateral carotid arteries 6/18/2021   • Otalgia     Both   • Perforation of tympanic membrane     Left   • Postoperative wound infection    • Vitamin D deficiency    • Wears glasses     reading     Past Surgical History:   Procedure Laterality Date   • ABDOMINAL SURGERY     • AMPUTATION FOOT     • ANGIOPLASTY      coronary   • ANKLE ARTHROSCOPY Left 02/26/2021    Procedure: Left foot hardware removal, ankle arthroscopy, bone grafting , left foot exostectomy;  Surgeon: Ignacio Lord DPM;  Location: [facility] OR;  Service: Podiatry;  Laterality: Left;   • BREAST BIOPSY Right    • CARDIAC CATHETERIZATION     • CARDIAC CATHETERIZATION N/A 06/20/2017    Procedure: Right Heart Cath;  Surgeon: Can Kwon MD PhD;  Location: [facility] CATH INVASIVE LOCATION;  Service:    • CARDIAC CATHETERIZATION N/A 02/18/2020    Procedure: Left Heart Cath;  Surgeon: Catalina Cooper MD;  Location: [facility] CATH INVASIVE LOCATION;  Service: Cardiology;  Laterality: N/A;   • CARDIAC CATHETERIZATION N/A 04/06/2022    Procedure: Left Heart Cath;  Surgeon: Sheryl Navas MD;  Location: [facility] CATH INVASIVE LOCATION;  Service: Cardiology;  Laterality: N/A;   • CARPAL TUNNEL RELEASE     • CHOLECYSTECTOMY     • COLONOSCOPY N/A 06/24/2020    Procedure: COLONOSCOPY;  Surgeon: Julián Maldonado MD;  Location: [facility] ENDOSCOPY;  Service: Gastroenterology;  Laterality: N/A;   • CORONARY ARTERY BYPASS GRAFT  2005   • ENDOSCOPY N/A 10/19/2018    Procedure: ESOPHAGOGASTRODUODENOSCOPY possible dilation;  Surgeon: Julián Maldonado MD;  Location: [facility] ENDOSCOPY;  Service:  Gastroenterology   • ENDOSCOPY N/A 06/24/2020    Procedure: ESOPHAGOGASTRODUODENOSCOPY WED appt please;  Surgeon: Julián Maldonado MD;  Location: [facility] ENDOSCOPY;  Service: Gastroenterology;  Laterality: N/A;   • ENDOSCOPY N/A 06/10/2022    Procedure: ESOPHAGOGASTRODUODENOSCOPY   room 380;  Surgeon: Jeremiah Wilkins MD;  Location: [facility] ENDOSCOPY;  Service: Gastroenterology;  Laterality: N/A;   • ENDOSCOPY N/A 1/10/2023    Procedure: ESOPHAGOGASTRODUODENOSCOPY;  Surgeon: Jeremiah Wilkins MD;  Location: [facility] ENDOSCOPY;  Service: Gastroenterology;  Laterality: N/A;   • ENDOSCOPY AND COLONOSCOPY     • FOOT SURGERY      Toes   • FOOT SURGERY     • GASTRIC BANDING      Revision, laparoscopic   • HYSTERECTOMY     • INCISION AND DRAINAGE LEG Left 03/12/2021    Procedure: Left ankle arthroscopic irrigation and debridement, screw removal;  Surgeon: Ignacio Lord DPM;  Location: [facility] OR;  Service: Podiatry;  Laterality: Left;   • MOUTH SURGERY     • SALPINGO OOPHORECTOMY     • SHOULDER SURGERY     • SUBTALAR ARTHRODESIS Left 01/16/2019    Procedure: LEFT FOOT HARDWARE REMOVAL, FIFTH METATARSAL , OPEN REDUCTION INTERNAL FIXATION, CALCANEAL OSTEOTOMY;  Surgeon: Ignacio Lord DPM;  Location: [facility] OR;  Service: Podiatry   • SUBTALAR ARTHRODESIS Left 10/16/2019    Procedure: foot hardware removal, subtalar joint fusion  possible de/reattachment of achilles tendon        (c-arm);  Surgeon: Ignacio Lord DPM;  Location: [facility] OR;  Service: Podiatry   • SUBTALAR ARTHRODESIS Left 09/30/2020    Procedure: subtalar, talonavicular joint arthrodesis.  Removal hardware.          (c-arm);  Surgeon: Ignacio Lord DPM;  Location: [facility] OR;  Service: Podiatry;  Laterality: Left;   • TRANSESOPHAGEAL ECHOCARDIOGRAM (LAMONTE)      With color flow     Social History     Socioeconomic History   • Marital status:    Tobacco Use   • Smoking status: Never   • Smokeless tobacco: Never   Vaping Use   • Vaping  Use: Never used   Substance and Sexual Activity   • Alcohol use: No   • Drug use: No   • Sexual activity: Defer       Allergies:  Allergies   Allergen Reactions   • Seroquel [Quetiapine] Anaphylaxis   • Avandia [Rosiglitazone] Swelling   • Morphine And Related Hallucinations   • Oxycodone Hallucinations       Medications:    Current Facility-Administered Medications:   •  [START ON 5/19/2023] !Vancomycin Trough Level Draw Needed, , Does not apply, Once, Reji Mendoza DO  •  acetaminophen (TYLENOL) tablet 650 mg, 650 mg, Oral, Q4H PRN, Reji Mendoza DO, 650 mg at 05/18/23 0304  •  aluminum-magnesium hydroxide-simethicone (MAALOX MAX) 400-400-40 MG/5ML suspension 15 mL, 15 mL, Oral, Q6H PRN, Reji Mendoza DO  •  amLODIPine (NORVASC) tablet 5 mg, 5 mg, Oral, Daily, Reji Mendoza DO, 5 mg at 05/18/23 0849  •  ARIPiprazole (ABILIFY) tablet 5 mg, 5 mg, Oral, Daily, Reji Mendoza DO, 5 mg at 05/18/23 0849  •  aspirin EC tablet 325 mg, 325 mg, Oral, Daily, Reji Mendoza DO, 325 mg at 05/18/23 0849  •  atorvastatin (LIPITOR) tablet 80 mg, 80 mg, Oral, Daily, Reji Mendoza DO, 80 mg at 05/18/23 0849  •  polyethylene glycol (MIRALAX) packet 17 g, 17 g, Oral, Daily PRN **AND** bisacodyl (DULCOLAX) EC tablet 5 mg, 5 mg, Oral, Daily PRN **AND** bisacodyl (DULCOLAX) suppository 10 mg, 10 mg, Rectal, Daily PRN, Reji Mendoza DO  •  clopidogrel (PLAVIX) tablet 75 mg, 75 mg, Oral, Daily, Reji Mendoza DO, 75 mg at 05/18/23 0849  •  dextrose (D50W) (25 g/50 mL) IV injection 25 g, 25 g, Intravenous, Q15 Min PRN, Reji Mendoza DO  •  dextrose (GLUTOSE) oral gel 15 g, 15 g, Oral, Q15 Min PRN, Reji Mendoza DO  •  folic acid (FOLVITE) tablet 1,000 mcg, 1,000 mcg, Oral, Daily, Reji Mendoza DO, 1,000 mcg at 05/18/23 0849  •  furosemide (LASIX) tablet 40 mg, 40 mg, Oral, Daily, Reji Mendoza DO, 40 mg at 05/18/23 0849  •  gabapentin (NEURONTIN)  capsule 100 mg, 100 mg, Oral, Q12H, Reji Mendoza, DO, 100 mg at 05/18/23 0849  •  glucagon (human recombinant) (GLUCAGEN DIAGNOSTIC) injection 1 mg, 1 mg, Intramuscular, Q15 Min PRN, Reji Mendoza DO  •  heparin (porcine) 5000 UNIT/ML injection 5,000 Units, 5,000 Units, Subcutaneous, Q8H, Reji Mendoza DO, 5,000 Units at 05/18/23 0610  •  hydroCHLOROthiazide (HYDRODIURIL) tablet 12.5 mg, 12.5 mg, Oral, Daily, Reji Mendoza DO, 12.5 mg at 05/18/23 0849  •  HYDROcodone-acetaminophen (NORCO) 7.5-325 MG per tablet 1 tablet, 1 tablet, Oral, Q6H PRN, Reji Mendoza DO, 1 tablet at 05/18/23 1130  •  Insulin Aspart (novoLOG) injection 0-14 Units, 0-14 Units, Subcutaneous, TID AC, Reji Mendoza R DO, 5 Units at 05/18/23 1130  •  insulin detemir (LEVEMIR) injection 40 Units, 40 Units, Subcutaneous, Nightly, Reji Mendoza DO, 40 Units at 05/17/23 2153  •  isosorbide mononitrate (IMDUR) 24 hr tablet 30 mg, 30 mg, Oral, Daily, Reji Mendoza DO, 30 mg at 05/18/23 0849  •  levothyroxine (SYNTHROID, LEVOTHROID) tablet 50 mcg, 50 mcg, Oral, QAM, Reji Mendoza, DO, 50 mcg at 05/18/23 0610  •  LORazepam (ATIVAN) tablet 0.5 mg, 0.5 mg, Oral, Q8H PRN, Reji Mendoza DO  •  Magnesium Low Dose Replacement - Follow Nurse / BPA Driven Protocol, , Does not apply, PRN, Doni Baker MD  •  metoclopramide (REGLAN) tablet 10 mg, 10 mg, Oral, 4x Daily PRN, Reji Mendoza DO  •  nitroglycerin (NITROSTAT) SL tablet 0.4 mg, 0.4 mg, Sublingual, Q5 Min PRN, Reji Mendoza DO  •  ondansetron (ZOFRAN) tablet 4 mg, 4 mg, Oral, Q6H PRN, 4 mg at 05/18/23 1130 **OR** ondansetron (ZOFRAN) injection 4 mg, 4 mg, Intravenous, Q6H PRN, Reji Mendoza DO  •  pantoprazole (PROTONIX) EC tablet 40 mg, 40 mg, Oral, Daily, Reji Mendoza DO, 40 mg at 05/18/23 0849  •  Pharmacy to dose vancomycin, , Does not apply, Continuous PRN, Reji Mendoza DO  •   piperacillin-tazobactam (ZOSYN) 3.375 g/100 mL 0.9% NS IVPB (mbp), 3.375 g, Intravenous, Q8H, Reji, Reji R, DO, 3.375 g at 05/18/23 0850  •  potassium chloride (K-DUR,KLOR-CON) ER tablet 40 mEq, 40 mEq, Oral, Q4H, Mahin Esteves MD, 40 mEq at 05/18/23 1143  •  Potassium Replacement - Follow Nurse / BPA Driven Protocol, , Does not apply, PRN, Doni Baker MD  •  ranolazine (RANEXA) 12 hr tablet 500 mg, 500 mg, Oral, Q12H, Reji Reji R, DO, 500 mg at 05/18/23 0849  •  sodium chloride 0.9 % flush 10 mL, 10 mL, Intravenous, PRN, Reji, Reji R, DO  •  sodium chloride 0.9 % flush 10 mL, 10 mL, Intravenous, Q12H, Reji, Reji R, DO, 10 mL at 05/17/23 2153  •  sodium chloride 0.9 % flush 10 mL, 10 mL, Intravenous, PRN, Reji, Reji R, DO  •  sodium chloride 0.9 % infusion 40 mL, 40 mL, Intravenous, PRN, Reji, Reji R, DO  •  sodium chloride 0.9 % infusion, 100 mL/hr, Intravenous, Continuous, Reji, Reji R, DO, Last Rate: 100 mL/hr at 05/17/23 2152, 100 mL/hr at 05/17/23 2152  •  sucralfate (CARAFATE) tablet 1 g, 1 g, Oral, 4x Daily, Reji Reji R, DO, 1 g at 05/18/23 1130  •  vancomycin 1250 mg/250 mL 0.9% NS IVPB ([facility]), 1,250 mg, Intravenous, Q12H, Reji Reji R, DO, 1,250 mg at 05/18/23 0911  •  venlafaxine XR (EFFEXOR-XR) 24 hr capsule 75 mg, 75 mg, Oral, Daily With Breakfast, Christian Mendozao R, DO, 75 mg at 05/18/23 0849        OBJECTIVE     Vitals:    05/18/23 1451   BP: 123/58   Pulse: 78   Resp: 20   Temp: 97.1 °F (36.2 °C)   SpO2: 92%         Physical Exam  Physical Exam  Vitals reviewed. Nursing notes reviewed.  Constitutional:       Appearance: Well-developed.   Skin:     General: Skin is warm and dry.      Coloration: Skin is not pale.      Findings: No erythema or rash. Right toe ulcers  Neurological:      Mental Status: Pt is alert and oriented to person, place, and time.   Psychiatric:         Behavior: Behavior normal.         Thought Content:  Thought content normal.         Judgment: Judgment normal.       Results Review:  Lab Results (last 48 hours)     Procedure Component Value Units Date/Time    Blood Culture - Blood, Hand, Left [[id number]]  (Normal) Collected: 05/17/23 1151    Specimen: Blood from Hand, Left Updated: 05/18/23 1216     Blood Culture No growth at 24 hours    POC Glucose Once [[id number]]  (Abnormal) Collected: 05/18/23 1041    Specimen: Blood Updated: 05/18/23 1106     Glucose 200 mg/dL      Comment: RN NotifiedOperator: [id number] cloudControlMeter ID: [id number]       Blood Culture - Blood, Arm, Right [[id number]]  (Normal) Collected: 05/17/23 0954    Specimen: Blood from Arm, Right Updated: 05/18/23 1015     Blood Culture No growth at 24 hours    POC Glucose Once [[id number]]  (Abnormal) Collected: 05/18/23 0653    Specimen: Blood Updated: 05/18/23 0723     Glucose 229 mg/dL      Comment: RN NotifiedOperator: [id number] cloudControlMeter ID: [id number]       Comprehensive Metabolic Panel [[id number]]  (Abnormal) Collected: 05/18/23 0556    Specimen: Blood Updated: 05/18/23 0622     Glucose 247 mg/dL      BUN 15 mg/dL      Creatinine 1.10 mg/dL      Sodium 130 mmol/L      Potassium 3.2 mmol/L      Chloride 98 mmol/L      CO2 23.0 mmol/L      Calcium 8.3 mg/dL      Total Protein 5.9 g/dL      Albumin 2.7 g/dL      ALT (SGPT) 11 U/L      AST (SGOT) 12 U/L      Alkaline Phosphatase 84 U/L      Total Bilirubin 0.6 mg/dL      Globulin 3.2 gm/dL      A/G Ratio 0.8 g/dL      BUN/Creatinine Ratio 13.6     Anion Gap 9.0 mmol/L      eGFR 57.3 mL/min/1.73     Narrative:      GFR Normal >60  Chronic Kidney Disease <60  Kidney Failure <15      CBC Auto Differential [[id number]]  (Abnormal) Collected: 05/18/23 0556    Specimen: Blood Updated: 05/18/23 0601     WBC 16.99 10*3/mm3      RBC 3.75 10*6/mm3      Hemoglobin 10.7 g/dL      Hematocrit 31.8 %      MCV 84.8 fL      MCH 28.5 pg      MCHC 33.6 g/dL      RDW 13.3 %      RDW-SD 41.6 fl       MPV 9.6 fL      Platelets 233 10*3/mm3      Neutrophil % 84.0 %      Lymphocyte % 5.8 %      Monocyte % 8.5 %      Eosinophil % 0.1 %      Basophil % 0.7 %      Immature Grans % 0.9 %      Neutrophils, Absolute 14.26 10*3/mm3      Lymphocytes, Absolute 0.99 10*3/mm3      Monocytes, Absolute 1.45 10*3/mm3      Eosinophils, Absolute 0.02 10*3/mm3      Basophils, Absolute 0.12 10*3/mm3      Immature Grans, Absolute 0.15 10*3/mm3      nRBC 0.0 /100 WBC     Lactic Acid, Plasma [[id number]]  (Normal) Collected: 05/18/23 0000    Specimen: Blood Updated: 05/18/23 0019     Lactate 1.2 mmol/L     POC Glucose Once [[id number]]  (Abnormal) Collected: 05/17/23 1924    Specimen: Blood Updated: 05/17/23 2011     Glucose 295 mg/dL      Comment: RN NotifiedOperator: [id number] [facility] ID: [id number]       Potassium [[id number]]  (Normal) Collected: 05/17/23 1728    Specimen: Blood Updated: 05/17/23 1749     Potassium 4.1 mmol/L      Comment: Slight hemolysis detected by analyzer. Results may be affected.       POC Glucose Once [[id number]]  (Abnormal) Collected: 05/17/23 1617    Specimen: Blood Updated: 05/17/23 1631     Glucose 303 mg/dL      Comment: RN NotifiedOperator: [id number] DACOSTA ALISEMeter ID: [id number]       TSH [[id number]]  (Normal) Collected: 05/17/23 0954    Specimen: Blood Updated: 05/17/23 1504     TSH 1.170 uIU/mL     Hemoglobin A1c [[id number]]  (Abnormal) Collected: 05/17/23 0954    Specimen: Blood Updated: 05/17/23 1456     Hemoglobin A1C 9.80 %     Narrative:      Hemoglobin A1C Ranges:    Increased Risk for Diabetes  5.7% to 6.4%  Diabetes                     >= 6.5%  Diabetic Goal                < 7.0%    Urinalysis, Microscopic Only - Straight Cath [[id number]]  (Abnormal) Collected: 05/17/23 1040    Specimen: Urine from Straight Cath Updated: 05/17/23 1148     RBC, UA 0-2 /HPF      WBC, UA 0-2 /HPF      Comment: Urine culture not indicated.        Bacteria, UA None Seen /HPF      Squamous  Epithelial Cells, UA 3-5 /HPF      Hyaline Casts, UA 0-2 /LPF      Fine Granular Casts, UA 7-12 /LPF      Methodology Manual Light Microscopy    POC Glucose Once [[id number]]  (Abnormal) Collected: 05/17/23 0832    Specimen: Blood Updated: 05/17/23 1123     Glucose 319 mg/dL      Comment: RN NotifiedOperator: [id number] PARKINSON LEAMeter ID: [id number]       Urinalysis With Culture If Indicated - Straight Cath [[id number]]  (Abnormal) Collected: 05/17/23 1040    Specimen: Urine from Straight Cath Updated: 05/17/23 1122     Color, UA Yellow     Appearance, UA Cloudy     pH, UA 5.5     Specific Gravity, UA 1.021     Glucose, UA >=1000 mg/dL (3+)     Ketones, UA Trace     Bilirubin, UA Negative     Blood, UA Trace     Protein, UA >=300 mg/dL (3+)     Leuk Esterase, UA Negative     Nitrite, UA Negative     Urobilinogen, UA 1.0 E.U./dL    Narrative:      In absence of clinical symptoms, the presence of pyuria, bacteria, and/or nitrites on the urinalysis result does not correlate with infection.    [place] Draw [[id number]] Collected: 05/17/23 0954    Specimen: Blood Updated: 05/17/23 1100    Narrative:      The following orders were created for panel order [place] Draw.  Procedure                               Abnormality         Status                     ---------                               -----------         ------                     Green Top (Gel)[[id number]]                                  Final result               Lavender Top[[id number]]                                     Final result               Gold Top - SST[[id number]]                                   Final result               Light Blue Top[[id number]]                                   Final result                 Please view results for these tests on the individual orders.    Green Top (Gel) [[id number]] Collected: 05/17/23 0954    Specimen: Blood Updated: 05/17/23 1100     Extra Tube Hold for add-ons.     Comment: Auto resulted.       Gold Top -  SST [[id number]] Collected: 05/17/23 0954    Specimen: Blood Updated: 05/17/23 1100     Extra Tube Hold for add-ons.     Comment: Auto resulted.       Lavender Top [[id number]] Collected: 05/17/23 0954    Specimen: Blood Updated: 05/17/23 1100     Extra Tube hold for add-on     Comment: Auto resulted       Light Blue Top [[id number]] Collected: 05/17/23 0954    Specimen: Blood Updated: 05/17/23 1100     Extra Tube Hold for add-ons.     Comment: Auto resulted       Single High Sensitivity Troponin T [[id number]]  (Abnormal) Collected: 05/17/23 0954    Specimen: Blood Updated: 05/17/23 1043     HS Troponin T 39 ng/L     Narrative:      High Sensitive Troponin T Reference Range:  <10.0 ng/L- Negative Female for AMI  <15.0 ng/L- Negative Male for AMI  >=10 - Abnormal Female indicating possible myocardial injury.  >=15 - Abnormal Male indicating possible myocardial injury.   Clinicians would have to utilize clinical acumen, EKG, Troponin, and serial changes to determine if it is an Acute Myocardial Infarction or myocardial injury due to an underlying chronic condition.         Magnesium [[id number]]  (Normal) Collected: 05/17/23 0954    Specimen: Blood Updated: 05/17/23 1038     Magnesium 1.6 mg/dL     Comprehensive Metabolic Panel [[id number]]  (Abnormal) Collected: 05/17/23 0954    Specimen: Blood Updated: 05/17/23 1038     Glucose 307 mg/dL      BUN 14 mg/dL      Creatinine 1.26 mg/dL      Sodium 128 mmol/L      Potassium 3.3 mmol/L      Chloride 92 mmol/L      CO2 21.0 mmol/L      Calcium 8.5 mg/dL      Total Protein 6.3 g/dL      Albumin 3.0 g/dL      ALT (SGPT) 11 U/L      AST (SGOT) 13 U/L      Alkaline Phosphatase 90 U/L      Total Bilirubin 0.7 mg/dL      Globulin 3.3 gm/dL      A/G Ratio 0.9 g/dL      BUN/Creatinine Ratio 11.1     Anion Gap 15.0 mmol/L      eGFR 48.7 mL/min/1.73     Narrative:      GFR Normal >60  Chronic Kidney Disease <60  Kidney Failure <15      Lactic Acid, Plasma [[id number]]  (Normal) Collected:  05/17/23 0954    Specimen: Blood Updated: 05/17/23 1032     Lactate 1.7 mmol/L     Blood Gas, Arterial - [[id number]]  (Abnormal) Collected: 05/17/23 1026    Specimen: Arterial Blood Updated: 05/17/23 1026     Site Left Radial     Tonio's Test N/A     pH, Arterial 7.451 pH units      Comment: 83 Value above reference range        pCO2, Arterial 36.0 mm Hg      pO2, Arterial 65.7 mm Hg      Comment: 84 Value below reference range        HCO3, Arterial 25.1 mmol/L      Base Excess, Arterial 1.3 mmol/L      O2 Saturation, Arterial 94.8 %      Barometric Pressure for Blood Gas 747 mmHg      Modality Nasal Cannula     Flow Rate 2.0 lpm      Ventilator Mode NA     Collected by DAYA horn     Comment: Meter: [id number]     :  [id number]       CBC & Differential [[id number]]  (Abnormal) Collected: 05/17/23 0954    Specimen: Blood Updated: 05/17/23 1011    Narrative:      The following orders were created for panel order CBC & Differential.  Procedure                               Abnormality         Status                     ---------                               -----------         ------                     CBC Auto Differential[[id number]]        Abnormal            Final result               Scan Slide[[id number]]                                                                    Please view results for these tests on the individual orders.    CBC Auto Differential [[id number]]  (Abnormal) Collected: 05/17/23 0954    Specimen: Blood Updated: 05/17/23 1011     WBC 19.65 10*3/mm3      RBC 4.01 10*6/mm3      Hemoglobin 11.8 g/dL      Hematocrit 34.1 %      MCV 85.0 fL      MCH 29.4 pg      MCHC 34.6 g/dL      RDW 13.3 %      RDW-SD 41.4 fl      MPV 9.8 fL      Platelets 249 10*3/mm3      Neutrophil % 86.8 %      Lymphocyte % 5.1 %      Monocyte % 5.8 %      Eosinophil % 0.1 %      Basophil % 0.7 %      Immature Grans % 1.5 %      Neutrophils, Absolute 17.08 10*3/mm3      Lymphocytes, Absolute 1.01 10*3/mm3       Monocytes, Absolute 1.13 10*3/mm3      Eosinophils, Absolute 0.01 10*3/mm3      Basophils, Absolute 0.13 10*3/mm3      Immature Grans, Absolute 0.29 10*3/mm3      nRBC 0.0 /100 WBC     COVID-19 and FLU A/B PCR - Swab, Nasopharynx [[id number]]  (Normal) Collected: 05/17/23 0830    Specimen: Swab from Nasopharynx Updated: 05/17/23 0908     COVID19 Not Detected     Influenza A PCR Not Detected     Influenza B PCR Not Detected    Narrative:      Fact sheet for providers: https://www.fda.gov/media/614602/download    Fact sheet for patients: https://www.fda.gov/media/700767/download    Test performed by PCR.        Imaging Results (Last 72 Hours)     Procedure Component Value Units Date/Time    US Guided Vascular Access [[id number]] Collected: 05/17/23 1139     Updated: 05/17/23 1143    Narrative:      Ultrasound guidance vascular    FINDINGS:  Real-time ultrasound imaging was provided for vascular access.  Please refer to  procedure note for real-time exam findings.  The right cephalic vein was found  to be patent and compressible.  Access under direct sonographic visualization.  Permanent saved images sent to the PACS for documentation.      IR Insert Midline Without Port Pump 5 Plus [[id number]] Resulted: 05/17/23 0944     Updated: 05/17/23 0944    Narrative:      This procedure was auto-finalized with no dictation required.    XR Chest 1 View [[id number]] Collected: 05/17/23 0830     Updated: 05/17/23 0932    Narrative:      HISTORY:  Dizziness and weakness.    FINDINGS:  No infiltrate, effusion, or pneumothorax is seen.  Heart size and pulmonary  vascularity are normal.  The lungs are clear.  The mediastinum, maria guadalupe, and  visualized osseous structures are unremarkable.      Impression:      No significant abnormality of the chest.      XR Tibia Fibula 2 View Right [[id number]] Collected: 05/17/23 0900     Updated: 05/17/23 0929    Narrative:      TECHNIQUE:  AP and lateral views of the right foreleg were  obtained.    HISTORY:  Cellulitis.  Pain along the medial leg with redness and swelling.    COMPARISON:  None.    FINDINGS:  No displaced fracture is seen.  Joint spaces are well preserved.  There is no  appreciable soft tissue swelling.  No radiopaque foreign body or soft tissue  gas.  Mild vascular calcification.      Impression:      No acute radiographic abnormality.               ASSESSMENT/PLAN       Examination and evaluation of wound(s) was performed.    DIAGNOSIS:     Diabetes Mellitus with foot ulcer    PLAN:     Paint stable ulcers with Betadine.  Put PolyMem on the ulcer to right second toe.  We will sign off.  Patient to follow-up in wound care center on discharge.  Call with questions.    Discussed findings and treatment plan including risks, benefits, and treatment options with patient in detail. Patient agreed with treatment [email or web address]          This document has been electronically signed by BEBE Bullock on May 18, 2023 15:16 CDT

## 2023-05-18 NOTE — PAYOR COMM NOTE
"[facility]  Case Managment Extender   Ethel Fuentes  (P) [phone]  (F) [phone]          REF# [id number]  Ariana Bailey (63 y.o. Female)     Date of Birth   1960    Social Security Number       Address   [address] [place] [place] [address]    Home Phone   [phone]    MRN   [id number]       Congregational   [facility], [place]    Marital Status                               Admission Date   5/17/23    Admission Type   Emergency    Admitting Provider       Attending Provider   Mahin Esteves MD    Department, Room/Bed   [address], 411/1       Discharge Date       Discharge Disposition       Discharge Destination                               Attending Provider: Mahin Esteves MD    Allergies: Seroquel [Quetiapine], Avandia [Rosiglitazone], Morphine And Related, Oxycodone    Isolation: None   Infection: None   Code Status: CPR    Ht: 172.7 cm (68\")   Wt: 127 kg (280 lb)    Admission Cmt: None   Principal Problem: Sepsis without acute organ dysfunction, due to unspecified organism [A41.9]                 Active Insurance as of 5/17/2023     Primary Coverage     Payor Plan Insurance Group Employer/Plan Group    [facility] BLUE [employer] EMPL[employer]EE [id number]     Payor Plan Address Payor Plan Phone Number Payor Plan Fax Number Effective Dates    [id number] [phone]  1/1/2022 - None Entered    [place] [address]       Subscriber Name Subscriber Birth Date Member ID       ARIANA BAILEY 1960 [id number]                 Emergency Contacts      (Rel.) Home Phone Work Phone Mobile Phone    Nadeem Bailey (Spouse) [phone] -- [phone]    Елена Daivd (Sister) [phone] -- [phone]               History & Physical      Reji Mendoza DO at 05/17/23 1230              [facility]  HISTORY AND PHYSICAL      Date of Admission: " 5/17/2023  Primary Care Physician: Dolly Foss APRN    Subjective     Chief Complaint:   Fevers/chills and right lower extremity pain with erythema.  History of Present Illness  62yo female pmh significant htn/hyperlipidemia/dm2/hypothyroid/cad/HFpEF/obesity presents ED via EMS c/o 1d hx fever/chills/generalized weakness.  ROS (+) nonproductive cough.  Denies sore throat/rhinorrhea/chest pain/soa/abd pain/n/v/d/dysuria.    ER assessment: 5/17/2023  On evaluation in the ER the patient is currently lying in bed.  She is awake and alert.  She states she has not been feeling well for the last few days.  Becoming progressively worse.  She has been having fevers and chills.  Some nausea.  She has had pain in her right lower extremity which has been progressive worse.  She states that she has remained confused and weak.  Denies any cough.  She does have shortness of breath mainly due to debility.  Ultrasound of the right lower extremity was completed which did prove out to be negative for DVT.          Review of Systems   Constitutional: Positive for chills, fatigue and fever.   HENT: Negative.  Negative for congestion.    Eyes: Negative.    Respiratory: Positive for shortness of breath. Negative for cough.    Cardiovascular: Negative.  Negative for chest pain and palpitations.   Gastrointestinal: Positive for nausea and vomiting. Negative for abdominal distention and abdominal pain.   Endocrine: Negative.    Genitourinary: Negative.    Musculoskeletal: Negative.    Skin: Negative.         Positive edema erythema right lower extremity   Neurological: Positive for weakness. Negative for dizziness.   Hematological: Negative.    Psychiatric/Behavioral: Negative.         Otherwise complete ROS reviewed and negative except as mentioned in the HPI.    Past Medical History:   Past Medical History:   Diagnosis Date   • Acute bacterial endocarditis 3/13/2021   • Angina, class IV    • Anxiety    • Anxiety and depression    •  Arthritis    • Benign paroxysmal positional vertigo    • Bladder disorder     has bladder stimulator   • Carpal tunnel syndrome    • CHF (congestive heart failure)    • Chronic pain    • Coronary atherosclerosis     hx CABG 2005.  is followed by Dr Kwon   • Depression    • Diabetes mellitus     Type 2, controlled   • Diabetic polyneuropathy    • Disease of thyroid gland    • Elevated cholesterol    • Female stress incontinence    • Foot pain, left    • Full dentures    • Gastroparesis    • GERD (gastroesophageal reflux disease)    • Hyperlipidemia    • Hypertension    • Low back pain    • Malaise and fatigue    • Multiple joint pain    • Obesity     Refuses to be weighed   • Occlusion and stenosis of bilateral carotid arteries 6/18/2021   • Otalgia     Both   • Perforation of tympanic membrane     Left   • Postoperative wound infection    • Vitamin D deficiency    • Wears glasses     reading     Past Surgical History:  Past Surgical History:   Procedure Laterality Date   • ABDOMINAL SURGERY     • AMPUTATION FOOT     • ANGIOPLASTY      coronary   • ANKLE ARTHROSCOPY Left 02/26/2021    Procedure: Left foot hardware removal, ankle arthroscopy, bone grafting , left foot exostectomy;  Surgeon: Ignacio Lord DPM;  Location: [facility];  Service: Podiatry;  Laterality: Left;   • BREAST BIOPSY Right    • CARDIAC CATHETERIZATION     • CARDIAC CATHETERIZATION N/A 06/20/2017    Procedure: Right Heart Cath;  Surgeon: Can Kwon MD PhD;  Location: [facility] INVASIVE LOCATION;  Service:    • CARDIAC CATHETERIZATION N/A 02/18/2020    Procedure: Left Heart Cath;  Surgeon: Catalina Cooper MD;  Location: [facility] CATH INVASIVE LOCATION;  Service: Cardiology;  Laterality: N/A;   • CARDIAC CATHETERIZATION N/A 04/06/2022    Procedure: Left Heart Cath;  Surgeon: Sheryl Navas MD;  Location: [facility] INVASIVE LOCATION;  Service: Cardiology;  Laterality: N/A;   • CARPAL TUNNEL RELEASE     • CHOLECYSTECTOMY      • COLONOSCOPY N/A 06/24/2020    Procedure: COLONOSCOPY;  Surgeon: Julián Maldonado MD;  Location: [facility] ENDOSCOPY;  Service: Gastroenterology;  Laterality: N/A;   • CORONARY ARTERY BYPASS GRAFT  2005   • ENDOSCOPY N/A 10/19/2018    Procedure: ESOPHAGOGASTRODUODENOSCOPY possible dilation;  Surgeon: Julián Maldonado MD;  Location: [facility] ENDOSCOPY;  Service: Gastroenterology   • ENDOSCOPY N/A 06/24/2020    Procedure: ESOPHAGOGASTRODUODENOSCOPY WED appt please;  Surgeon: Julián Maldonado MD;  Location: [facility] ENDOSCOPY;  Service: Gastroenterology;  Laterality: N/A;   • ENDOSCOPY N/A 06/10/2022    Procedure: ESOPHAGOGASTRODUODENOSCOPY   room 380;  Surgeon: Jeremiah Wilkins MD;  Location: [facility] ENDOSCOPY;  Service: Gastroenterology;  Laterality: N/A;   • ENDOSCOPY N/A 1/10/2023    Procedure: ESOPHAGOGASTRODUODENOSCOPY;  Surgeon: Jeremiah Wilkins MD;  Location: [facility] ENDOSCOPY;  Service: Gastroenterology;  Laterality: N/A;   • ENDOSCOPY AND COLONOSCOPY     • FOOT SURGERY      Toes   • FOOT SURGERY     • GASTRIC BANDING      Revision, laparoscopic   • HYSTERECTOMY     • INCISION AND DRAINAGE LEG Left 03/12/2021    Procedure: Left ankle arthroscopic irrigation and debridement, screw removal;  Surgeon: Ignacio Lord DPM;  Location: [facility] OR;  Service: Podiatry;  Laterality: Left;   • MOUTH SURGERY     • SALPINGO OOPHORECTOMY     • SHOULDER SURGERY     • SUBTALAR ARTHRODESIS Left 01/16/2019    Procedure: LEFT FOOT HARDWARE REMOVAL, FIFTH METATARSAL , OPEN REDUCTION INTERNAL FIXATION, CALCANEAL OSTEOTOMY;  Surgeon: Ignacio Lord DPM;  Location: [facility] OR;  Service: Podiatry   • SUBTALAR ARTHRODESIS Left 10/16/2019    Procedure: foot hardware removal, subtalar joint fusion  possible de/reattachment of achilles tendon        (c-arm);  Surgeon: Ignacio Lord DPM;  Location: [facility] OR;  Service: Podiatry   • SUBTALAR ARTHRODESIS Left 09/30/2020    Procedure: subtalar, talonavicular joint arthrodesis.   Removal hardware.          (c-arm);  Surgeon: Ignacio Lord DPM;  Location: [facility];  Service: Podiatry;  Laterality: Left;   • TRANSESOPHAGEAL ECHOCARDIOGRAM (LAMONTE)      With color flow     Social History:  reports that she has never smoked. She has never used smokeless tobacco. She reports that she does not drink alcohol and does not use drugs.    Family History: family history includes Diabetes in her sister, sister, sister, sister, sister, sister and another family member; Heart disease in her mother, sister, sister, and another family member; Hypertension in her mother, sister, sister, and another family member; Stroke in her mother.     Allergies:  Allergies   Allergen Reactions   • Seroquel [Quetiapine] Anaphylaxis   • Avandia [Rosiglitazone] Swelling   • Morphine And Related Hallucinations   • Oxycodone Hallucinations       Medications:  Prior to Admission medications    Medication Sig Start Date End Date Taking? Authorizing Provider   amLODIPine (NORVASC) 5 MG tablet Take 1 tablet by mouth Daily. 2/27/23   Dolly Foss APRN   ARIPiprazole (ABILIFY) 5 MG tablet Take 1 tablet by mouth Daily. 3/10/23   Dolly Foss APRN   aspirin  MG tablet Take 1 tablet by mouth Daily. 2/8/22   Mahin Esteves MD   atorvastatin (LIPITOR) 80 MG tablet Take 1 tablet by mouth Daily. 9/7/22   Dolly Foss APRN B-D ULTRAFINE III SHORT PEN 31G X 8 MM misc USE TO INJECT 5 TIMES A DAY 11/11/22   Elvis Gamboa APRN B-D ULTRAFINE III SHORT PEN 31G X 8 MM misc USE UP TO 4 (FOUR) TIMES DAILY WITH INSULIN AS DIRECTED. 12/27/22   Dolly Foss APRN   BD SHARPS CONTAINER HOME misc 1 each Take As Directed. 9/7/18   Francisca Fong MD   Blood Glucose Monitoring Suppl (ONE TOUCH ULTRA MINI) w/Device kit Patient will need the Accu-Check Avitio meter 10/18/21   Kimberly Ferguson APRN   butalbital-acetaminophen-caffeine (FIORICET, ESGIC) -40 MG per tablet Take one to two tablets by mouth  per headache episode as needed. 4/21/23   Dolly Foss APRN   Calcium Citrate-Vitamin D 250-200 MG-UNIT tablet Take 2 tablets by mouth 2 (two) times a day.    Provider, MD Esther   clopidogrel (PLAVIX) 75 MG tablet Take 1 tablet by mouth Daily. 3/28/23   Dolly Foss APRN   [facility] Diclofenac Sodium 1 % gel gel APPLY 4 G TOPICALLY TO THE APPROPRIATE AREA AS DIRECTED 2 (TWO) TIMES A DAY. SMALL AMOUNT TO AFFECTED AREA 1/3/23   Rohan Lazo MD   cyanocobalamin 1000 MCG/ML injection INJECT 1 ML INTO THE APPROPRIATE MUSCLE AS DIRECTED BY PRESCRIBER EVERY 28 (TWENTY-EIGHT) DAYS. 4/7/23   Pranav Sutton MD   famotidine (PEPCID) 40 MG tablet Take 1 tablet by mouth Daily. 4/14/23   ProviderEsther MD   fluticasone (FLONASE) 50 MCG/ACT nasal spray INSTILL 2 SPRAYS IN EACH NOSTRIL AS DIRECTED BY PROVIDER DAILY 2/1/23   Dolly Foss APRN   folic acid (FOLVITE) 1 MG tablet TAKE 1 TABLET BY MOUTH EVERY DAY 4/18/23   Teressa Hammond APRN   furosemide (LASIX) 40 MG tablet TAKE 1 TABLET BY MOUTH EVERY DAY 2/10/23   Dolly Foss APRN   gabapentin (NEURONTIN) 100 MG capsule TAKE 1 CAPSULE BY MOUTH EVERY 12 HOURS 4/17/23   Dolly Foss APRN   glucose blood (Accu-Chek Guide) test strip 1 each by Other route 4 (Four) Times a Day. To test blood sugar 4X daily. For  Dx E11.65 2/28/23   Kimberly Ferguson APRN   glucose monitor monitoring kit 1 each Daily. accucheck eve meter, E11.9 6/11/20   Elvis Gamboa APRN   hydroCHLOROthiazide (HYDRODIURIL) 12.5 MG tablet TAKE 1 TABLET BY MOUTH EVERY DAY 3/15/23   Dolly Foss APRN   HYDROcodone-acetaminophen (NORCO) 7.5-325 MG per tablet Take 1 tablet by mouth Every 6 (Six) Hours As Needed for Moderate Pain. Pls fill on 5/10 5/5/23   Dolly Foss APRN   insulin aspart (NovoLOG FlexPen) 100 UNIT/ML solution pen-injector sc pen INJECT 30 UNITS UNDER SKIN AS DIRECTED 3 TIMES A DAY WITH MEALS 3/9/23   Elvis Gamboa APRN   Insulin  "Glargine (BASAGLAR KWIKPEN) 100 UNIT/ML injection pen Inject 40 units into the appropriate area every morning and 35 units into the appropriate area every night 10/24/22   Elvis Gamboa APRN   isosorbide mononitrate (IMDUR) 30 MG 24 hr tablet TAKE 1 TABLET BY MOUTH EVERY DAY 4/11/23   Dolly Foss APRN   Lancets (accu-chek soft touch) lancets As directed 10/18/21   Kimberly Ferguson APRN   levothyroxine (Synthroid) 50 MCG tablet Take 1 tablet by mouth Daily. 4/26/23 4/25/24  Elvis Gamboa APRN   meclizine (ANTIVERT) 25 MG tablet Take 1 tablet by mouth 3 (Three) Times a Day As Needed for dizziness. 12/14/17   Evon Hernandez APRN   meloxicam (MOBIC) 15 MG tablet TAKE 1 TABLET BY MOUTH EVERY DAY WITH FOOD 12/7/22   Rohan Lazo MD   methocarbamol (ROBAXIN) 500 MG tablet TAKE 1 TABLET BY MOUTH 2 TIMES A DAY AS NEEDED FOR MUSCLE SPASMS. 6/7/22   Kimberly Ferguson APRN   metoclopramide (REGLAN) 10 MG tablet TAKE 1 TABLET BY MOUTH 4 (FOUR) TIMES A DAY AS NEEDED (NAUSEA). 11/29/22   Dolly Foss APRN   metoclopramide (REGLAN) 5 MG tablet Take 1 tablet by mouth 3 (Three) Times a Day As Needed (vomiting). 12/27/22   Addi Johnson MD   metoprolol succinate XL (TOPROL-XL) 50 MG 24 hr tablet Take 1 tablet by mouth Daily. Dose increased 5/24/21 12/27/22   Dolly Foss APRN   naloxone (NARCAN) 0.4 MG/ML injection Infuse 0.5 mL into a venous catheter Every 5 (Five) Minutes As Needed for Opioid Reversal. 3/13/21   Nick Faye MD Needle Disp, (Easy Touch FlipLock Needles) 25G X 1-1/2\" misc 1 each Every 28 (Twenty-Eight) Days. For administering B12 cyanocobalomin. Dx vitamin B12 deficiency. 5/24/22   Ferguson, Crystal Audrey, APRN   Needle, Disp, (Hypodermic Needle) 20G X 1\" misc 1 each Every 28 (Twenty-Eight) Days. For drawing up B12 for injection monthly, change back to smaller gauge needle prior to injection. Dx Vitamin B12  Deficiency. 5/24/22   Kimberly Ferguson, BEBE " "  nitroglycerin (NITROSTAT) 0.4 MG SL tablet Place 1 tablet under the tongue Every 5 (Five) Minutes As Needed for Chest Pain. Take no more than 3 doses in 15 minutes. 4/26/22   Marty, BEBE Luu   ondansetron (ZOFRAN) 8 MG tablet Take 1 tablet by mouth Every 8 (Eight) Hours As Needed for Nausea or Vomiting. 3/27/23   Dolly Foss APRN   ondansetron ODT (ZOFRAN-ODT) 4 MG disintegrating tablet Place 1 tablet on the tongue 4 (Four) Times a Day As Needed for Nausea or Vomiting. 2/27/23   Dolly Foss APRN   pantoprazole (PROTONIX) 20 MG EC tablet Take 2 tablets by mouth Daily. 2/20/23   Dolly Foss APRN   ranolazine (RANEXA) 500 MG 12 hr tablet Take 1 tablet by mouth Every 12 (Twelve) Hours. 6/29/22   Bill Gibson MD   sucralfate (Carafate) 1 g tablet Take 1 tablet by mouth 4 (Four) Times a Day. 1/11/23   Marleny Montoya PA-C   Syringe 20G X 1-1/2\" 3 ML misc 1 each Every 28 (Twenty-Eight) Days. For injection cyanocobalomin, Dx vit B12 deficiency. 5/24/22   Kimberly Ferguson APRN   venlafaxine XR (EFFEXOR-XR) 75 MG 24 hr capsule Take 1 capsule by mouth Daily With Breakfast. 3/10/23   Dolly Foss APRN   vitamin D (ERGOCALCIFEROL) 1.25 MG (29647 UT) capsule capsule TAKE 1 CAPSULE BY MOUTH EVERY 7 DAYS. 12/29/22   Dolly Foss APRN     I have utilized all available immediate resources to obtain, update, and review the patient's current medications.    Objective     Vital Signs: /59   Pulse 82   Temp 99.7 °F (37.6 °C) (Oral)   Resp 20   Ht 172.7 cm (68\")   Wt 116 kg (255 lb)   SpO2 98%   BMI 38.77 kg/m²   Physical Exam  Vitals and nursing note reviewed.   Constitutional:       Appearance: She is obese.   HENT:      Head: Normocephalic and atraumatic.      Right Ear: External ear normal.      Left Ear: External ear normal.      Nose: Nose normal.      Mouth/Throat:      Mouth: Mucous membranes are dry.      Pharynx: Oropharynx is clear.   Eyes:      General: No " scleral icterus.     Extraocular Movements: Extraocular movements intact.      Conjunctiva/sclera: Conjunctivae normal.      Pupils: Pupils are equal, round, and reactive to light.   Cardiovascular:      Rate and Rhythm: Normal rate and regular rhythm.      Heart sounds: No murmur heard.  Pulmonary:      Effort: Pulmonary effort is normal.      Breath sounds: Normal breath sounds.      Comments: Decreased breath sounds at the bases no rales rhonchi's wheezes    Abdominal:      General: Bowel sounds are normal. There is no distension.      Palpations: Abdomen is soft.      Tenderness: There is no abdominal tenderness.   Musculoskeletal:         General: Normal range of motion.      Cervical back: Normal range of motion and neck supple.      Right lower leg: Edema present.      Comments: Left below-knee amputation  Right lower extremity edema erythema tender to palpation.   Skin:     General: Skin is warm and dry.      Capillary Refill: Capillary refill takes less than 2 seconds.   Neurological:      General: No focal deficit present.      Mental Status: She is alert and oriented to person, place, and time.      Motor: Weakness present.   Psychiatric:         Mood and Affect: Mood normal.         Behavior: Behavior normal.              Results Reviewed:  Lab Results (last 24 hours)     Procedure Component Value Units Date/Time    Blood Culture - Blood, Hand, Left [[id number]] Collected: 05/17/23 1151    Specimen: Blood from Hand, Left Updated: 05/17/23 1203    Urinalysis, Microscopic Only - Straight Cath [[id number]]  (Abnormal) Collected: 05/17/23 1040    Specimen: Urine from Straight Cath Updated: 05/17/23 1148     RBC, UA 0-2 /HPF      WBC, UA 0-2 /HPF      Comment: Urine culture not indicated.        Bacteria, UA None Seen /HPF      Squamous Epithelial Cells, UA 3-5 /HPF      Hyaline Casts, UA 0-2 /LPF      Fine Granular Casts, UA 7-12 /LPF      Methodology Manual Light Microscopy    POC Glucose Once [[id number]]   (Abnormal) Collected: 05/17/23 0832    Specimen: Blood Updated: 05/17/23 1123     Glucose 319 mg/dL      Comment: RN NotifiedOperator: [id number] PARKINSON LEAMeter ID: [id number]       Urinalysis With Culture If Indicated - Straight Cath [[id number]]  (Abnormal) Collected: 05/17/23 1040    Specimen: Urine from Straight Cath Updated: 05/17/23 1122     Color, UA Yellow     Appearance, UA Cloudy     pH, UA 5.5     Specific Gravity, UA 1.021     Glucose, UA >=1000 mg/dL (3+)     Ketones, UA Trace     Bilirubin, UA Negative     Blood, UA Trace     Protein, UA >=300 mg/dL (3+)     Leuk Esterase, UA Negative     Nitrite, UA Negative     Urobilinogen, UA 1.0 E.U./dL    Narrative:      In absence of clinical symptoms, the presence of pyuria, bacteria, and/or nitrites on the urinalysis result does not correlate with infection.    [place] Draw [[id number]] Collected: 05/17/23 0954    Specimen: Blood Updated: 05/17/23 1100    Narrative:      The following orders were created for panel order [place] Draw.  Procedure                               Abnormality         Status                     ---------                               -----------         ------                     Green Top (Gel)[[id number]]                                  Final result               Lavender Top[[id number]]                                     Final result               Gold Top - SST[[id number]]                                   Final result               Light Blue Top[[id number]]                                   Final result                 Please view results for these tests on the individual orders.    Green Top (Gel) [[id number]] Collected: 05/17/23 0954    Specimen: Blood Updated: 05/17/23 1100     Extra Tube Hold for add-ons.     Comment: Auto resulted.       Gold Top - SST [[id number]] Collected: 05/17/23 0954    Specimen: Blood Updated: 05/17/23 1100     Extra Tube Hold for add-ons.     Comment: Auto resulted.       Lavender Top [[id number]]  Collected: 05/17/23 0954    Specimen: Blood Updated: 05/17/23 1100     Extra Tube hold for add-on     Comment: Auto resulted       Light Blue Top [[id number]] Collected: 05/17/23 0954    Specimen: Blood Updated: 05/17/23 1100     Extra Tube Hold for add-ons.     Comment: Auto resulted       Single High Sensitivity Troponin T [[id number]]  (Abnormal) Collected: 05/17/23 0954    Specimen: Blood Updated: 05/17/23 1043     HS Troponin T 39 ng/L     Narrative:      High Sensitive Troponin T Reference Range:  <10.0 ng/L- Negative Female for AMI  <15.0 ng/L- Negative Male for AMI  >=10 - Abnormal Female indicating possible myocardial injury.  >=15 - Abnormal Male indicating possible myocardial injury.   Clinicians would have to utilize clinical acumen, EKG, Troponin, and serial changes to determine if it is an Acute Myocardial Infarction or myocardial injury due to an underlying chronic condition.         Magnesium [[id number]]  (Normal) Collected: 05/17/23 0954    Specimen: Blood Updated: 05/17/23 1038     Magnesium 1.6 mg/dL     Comprehensive Metabolic Panel [[id number]]  (Abnormal) Collected: 05/17/23 0954    Specimen: Blood Updated: 05/17/23 1038     Glucose 307 mg/dL      BUN 14 mg/dL      Creatinine 1.26 mg/dL      Sodium 128 mmol/L      Potassium 3.3 mmol/L      Chloride 92 mmol/L      CO2 21.0 mmol/L      Calcium 8.5 mg/dL      Total Protein 6.3 g/dL      Albumin 3.0 g/dL      ALT (SGPT) 11 U/L      AST (SGOT) 13 U/L      Alkaline Phosphatase 90 U/L      Total Bilirubin 0.7 mg/dL      Globulin 3.3 gm/dL      A/G Ratio 0.9 g/dL      BUN/Creatinine Ratio 11.1     Anion Gap 15.0 mmol/L      eGFR 48.7 mL/min/1.73     Narrative:      GFR Normal >60  Chronic Kidney Disease <60  Kidney Failure <15      Lactic Acid, Plasma [[id number]]  (Normal) Collected: 05/17/23 0954    Specimen: Blood Updated: 05/17/23 1032     Lactate 1.7 mmol/L     Blood Gas, Arterial - [[id number]]  (Abnormal) Collected: 05/17/23 1026    Specimen:  Arterial Blood Updated: 05/17/23 1026     Site Left Radial     Tonio's Test N/A     pH, Arterial 7.451 pH units      Comment: 83 Value above reference range        pCO2, Arterial 36.0 mm Hg      pO2, Arterial 65.7 mm Hg      Comment: 84 Value below reference range        HCO3, Arterial 25.1 mmol/L      Base Excess, Arterial 1.3 mmol/L      O2 Saturation, Arterial 94.8 %      Barometric Pressure for Blood Gas 747 mmHg      Modality Nasal Cannula     Flow Rate 2.0 lpm      Ventilator Mode NA     Collected by DAYA horn     Comment: Meter: [id number]     :  [id number]       CBC & Differential [[id number]]  (Abnormal) Collected: 05/17/23 0954    Specimen: Blood Updated: 05/17/23 1011    Narrative:      The following orders were created for panel order CBC & Differential.  Procedure                               Abnormality         Status                     ---------                               -----------         ------                     CBC Auto Differential[[id number]]        Abnormal            Final result               Scan Slide[[id number]]                                                                    Please view results for these tests on the individual orders.    CBC Auto Differential [[id number]]  (Abnormal) Collected: 05/17/23 0954    Specimen: Blood Updated: 05/17/23 1011     WBC 19.65 10*3/mm3      RBC 4.01 10*6/mm3      Hemoglobin 11.8 g/dL      Hematocrit 34.1 %      MCV 85.0 fL      MCH 29.4 pg      MCHC 34.6 g/dL      RDW 13.3 %      RDW-SD 41.4 fl      MPV 9.8 fL      Platelets 249 10*3/mm3      Neutrophil % 86.8 %      Lymphocyte % 5.1 %      Monocyte % 5.8 %      Eosinophil % 0.1 %      Basophil % 0.7 %      Immature Grans % 1.5 %      Neutrophils, Absolute 17.08 10*3/mm3      Lymphocytes, Absolute 1.01 10*3/mm3      Monocytes, Absolute 1.13 10*3/mm3      Eosinophils, Absolute 0.01 10*3/mm3      Basophils, Absolute 0.13 10*3/mm3      Immature Grans, Absolute 0.29 10*3/mm3      nRBC 0.0  /100 WBC     Blood Culture - Blood, Arm, Right [[id number]] Collected: 05/17/23 0954    Specimen: Blood from Arm, Right Updated: 05/17/23 1004    COVID-19 and FLU A/B PCR - Swab, Nasopharynx [[id number]]  (Normal) Collected: 05/17/23 0830    Specimen: Swab from Nasopharynx Updated: 05/17/23 0908     COVID19 Not Detected     Influenza A PCR Not Detected     Influenza B PCR Not Detected    Narrative:      Fact sheet for providers: https://www.fda.gov/media/174581/download    Fact sheet for patients: https://www.fda.gov/media/382689/download    Test performed by PCR.        Imaging Results (Last 24 Hours)     Procedure Component Value Units Date/Time    US Guided Vascular Access [[id number]] Collected: 05/17/23 1139     Updated: 05/17/23 1143    Narrative:      Ultrasound guidance vascular    FINDINGS:  Real-time ultrasound imaging was provided for vascular access.  Please refer to  procedure note for real-time exam findings.  The right cephalic vein was found  to be patent and compressible.  Access under direct sonographic visualization.  Permanent saved images sent to the PACS for documentation.      IR Insert Midline Without Port Pump 5 Plus [[id number]] Resulted: 05/17/23 0944     Updated: 05/17/23 0944    Narrative:      This procedure was auto-finalized with no dictation required.    XR Chest 1 View [[id number]] Collected: 05/17/23 0830     Updated: 05/17/23 0932    Narrative:      HISTORY:  Dizziness and weakness.    FINDINGS:  No infiltrate, effusion, or pneumothorax is seen.  Heart size and pulmonary  vascularity are normal.  The lungs are clear.  The mediastinum, maria guadalupe, and  visualized osseous structures are unremarkable.      Impression:      No significant abnormality of the chest.      XR Tibia Fibula 2 View Right [[id number]] Collected: 05/17/23 0900     Updated: 05/17/23 0929    Narrative:      TECHNIQUE:  AP and lateral views of the right foreleg were obtained.    HISTORY:  Cellulitis.  Pain along the medial  leg with redness and swelling.    COMPARISON:  None.    FINDINGS:  No displaced fracture is seen.  Joint spaces are well preserved.  There is no  appreciable soft tissue swelling.  No radiopaque foreign body or soft tissue  gas.  Mild vascular calcification.      Impression:      No acute radiographic abnormality.          I have personally reviewed and interpreted the radiology studies and ECG obtained at time of admission.     Assessment / Plan     Assessment:   Active Hospital Problems    Diagnosis    • **Sepsis without acute organ dysfunction, due to unspecified organism      A/P  Right lower extremity cellulitis  Continue Zosyn and vancomycin  Ultrasound right lower extremity negative for DVT.  Keep right lower extremity elevated    Fevers chills  Initial temperature in the ER is 103.1.  Tylenol as needed    Hypertension  Continue amlodipine, aspirin, Lasix, hydrochlorothiazide    Coronary artery disease  Continue Ranexa, aspirin, Plavix, atorvastatin, Imdur    Hyperlipidemia   continue Lipitor    Diabetes mellitus type 2  A1c 9.8  ADA cardiac diet  Sliding scale    GERD  Continue Protonix  Continue care    Bipolar disorder  Continue Effexor, Abilify    Deconditioning  PT OT    CODE STATUS full code  DVT prophylaxis heparin      Medical Decision Making  Number and Complexity of problems: 3 complex problems  Differential Diagnosis: Cellulitis versus DVT    Conditions and Status:        Condition is unchanged.     MDM Data  ABG on admission pH 7.4 PCO2 is 36 PO2 65.7 bicarb 25 O2 sat 94.8%  Hemoglobin A1c is 9.8 TSH is 1.1  Lactate 1.7 not septic.  Sodium 128 potassium 3.3 CO2 is 21 chloride 92 BUN is 14 creatinine 1.26 EGFR is 48.7  CBC reveals a white count of 19.6 H&H is 11.8 and hematocrit 34.1 platelets are 249.  Neutrophils 86.8    External documents reviewed: Hospital chart  My EKG interpretation: Completed 5/17/2023  My plain film interpretation:   Chest x-ray completed 5/17/2023  No acute cardiopulmonary  process  X-ray tib-fib right lower extremity negative for fracture    Tests considered but not ordered: None     Decision rules/scores evaluated (example [id number], Wells, etc): N/A     Discussed with: The patient and she agrees to treatment plan     Treatment Plan  As above    Care Planning  Shared decision making: Patient updated on current status and informed of proposed care plan; is in agreement with plan  Code status and discussions: Full code    Disposition  Social Determinants of Health that impact treatment or disposition: N/A  I expect the patient to be discharged to skilled nursing facility when stabilized.    I confirmed that the patient's Advance Care Plan is present, code status is documented, or surrogate decision maker is listed in the patient's medical record.   The patient's surrogate decision maker is daughter    I discussed my findings and recommendations with the the patient and ER physician.    Estimated length of stay is to be determined    The patient was seen and examined by me on 5/17/2020 3 and 11 AM.    Electronically signed by Reji Mendoza DO, 05/17/23, 12:30 CDT.              Electronically signed by Reji Mendoza DO at 05/17/23 1615          Emergency Department Notes      Trudy Cates, RN at 05/17/23 0821        Angio called at this time.     Electronically signed by Trudy Cates, RN at 05/17/23 0821     Trudy Cates, RN at 05/17/23 0832        Blood glucose 319 at this time.     Electronically signed by Trudy Cates, RN at 05/17/23 0832     True Crocker MD at 05/17/23 1055          Subjective   History of Present Illness  62yo female pmh significant htn/hyperlipidemia/dm2/hypothyroid/cad/HFpEF/obesity presents ED via EMS c/o 1d hx fever/chills/generalized weakness.  ROS (+) nonproductive cough.  Denies sore throat/rhinorrhea/chest pain/soa/abd pain/n/v/d/dysuria.    History provided by:  Patient  Illness  Severity:  Severe  Onset quality:   Sudden  Chronicity:  New  Associated symptoms: cough, fatigue and fever    Associated symptoms: no shortness of breath        Review of Systems   Constitutional: Positive for chills, fatigue and fever.   HENT: Negative.    Eyes: Negative.    Respiratory: Positive for cough. Negative for shortness of breath.    Cardiovascular: Negative.    Gastrointestinal: Negative.    Genitourinary: Negative.    Musculoskeletal: Negative.    Skin: Positive for color change.   Allergic/Immunologic: Negative for immunocompromised state.   All other systems reviewed and are negative.      Past Medical History:   Diagnosis Date   • Acute bacterial endocarditis 3/13/2021   • Angina, class IV    • Anxiety    • Anxiety and depression    • Arthritis    • Benign paroxysmal positional vertigo    • Bladder disorder     has bladder stimulator   • Carpal tunnel syndrome    • CHF (congestive heart failure)    • Chronic pain    • Coronary atherosclerosis     hx CABG 2005.  is followed by Dr Kwon   • Depression    • Diabetes mellitus     Type 2, controlled   • Diabetic polyneuropathy    • Disease of thyroid gland    • Elevated cholesterol    • Female stress incontinence    • Foot pain, left    • Full dentures    • Gastroparesis    • GERD (gastroesophageal reflux disease)    • Hyperlipidemia    • Hypertension    • Low back pain    • Malaise and fatigue    • Multiple joint pain    • Obesity     Refuses to be weighed   • Occlusion and stenosis of bilateral carotid arteries 6/18/2021   • Otalgia     Both   • Perforation of tympanic membrane     Left   • Postoperative wound infection    • Vitamin D deficiency    • Wears glasses     reading       Allergies   Allergen Reactions   • Seroquel [Quetiapine] Anaphylaxis   • Avandia [Rosiglitazone] Swelling   • Morphine And Related Hallucinations   • Oxycodone Hallucinations       Past Surgical History:   Procedure Laterality Date   • ABDOMINAL SURGERY     • AMPUTATION FOOT     • ANGIOPLASTY      coronary    • ANKLE ARTHROSCOPY Left 02/26/2021    Procedure: Left foot hardware removal, ankle arthroscopy, bone grafting , left foot exostectomy;  Surgeon: Ignacio Lord DPM;  Location: [facility] OR;  Service: Podiatry;  Laterality: Left;   • BREAST BIOPSY Right    • CARDIAC CATHETERIZATION     • CARDIAC CATHETERIZATION N/A 06/20/2017    Procedure: Right Heart Cath;  Surgeon: Can Kwon MD PhD;  Location: [facility] CATH INVASIVE LOCATION;  Service:    • CARDIAC CATHETERIZATION N/A 02/18/2020    Procedure: Left Heart Cath;  Surgeon: Catalina Cooper MD;  Location: [facility] CATH INVASIVE LOCATION;  Service: Cardiology;  Laterality: N/A;   • CARDIAC CATHETERIZATION N/A 04/06/2022    Procedure: Left Heart Cath;  Surgeon: Sheryl Navas MD;  Location: [facility] CATH INVASIVE LOCATION;  Service: Cardiology;  Laterality: N/A;   • CARPAL TUNNEL RELEASE     • CHOLECYSTECTOMY     • COLONOSCOPY N/A 06/24/2020    Procedure: COLONOSCOPY;  Surgeon: Julián Maldonado MD;  Location: [facility] ENDOSCOPY;  Service: Gastroenterology;  Laterality: N/A;   • CORONARY ARTERY BYPASS GRAFT  2005   • ENDOSCOPY N/A 10/19/2018    Procedure: ESOPHAGOGASTRODUODENOSCOPY possible dilation;  Surgeon: Julián Maldonado MD;  Location: [facility] ENDOSCOPY;  Service: Gastroenterology   • ENDOSCOPY N/A 06/24/2020    Procedure: ESOPHAGOGASTRODUODENOSCOPY WED appt please;  Surgeon: Julián Maldonado MD;  Location: [facility] ENDOSCOPY;  Service: Gastroenterology;  Laterality: N/A;   • ENDOSCOPY N/A 06/10/2022    Procedure: ESOPHAGOGASTRODUODENOSCOPY   room 380;  Surgeon: Jeremiah Wilkins MD;  Location: [facility] ENDOSCOPY;  Service: Gastroenterology;  Laterality: N/A;   • ENDOSCOPY N/A 1/10/2023    Procedure: ESOPHAGOGASTRODUODENOSCOPY;  Surgeon: Jeremiah Wilkins MD;  Location: [facility] ENDOSCOPY;  Service: Gastroenterology;  Laterality: N/A;   • ENDOSCOPY AND COLONOSCOPY     • FOOT SURGERY      Toes   • FOOT SURGERY     • GASTRIC BANDING      Revision, laparoscopic    • HYSTERECTOMY     • INCISION AND DRAINAGE LEG Left 03/12/2021    Procedure: Left ankle arthroscopic irrigation and debridement, screw removal;  Surgeon: Ignacio Lord DPM;  Location: [facility];  Service: Podiatry;  Laterality: Left;   • MOUTH SURGERY     • SALPINGO OOPHORECTOMY     • SHOULDER SURGERY     • SUBTALAR ARTHRODESIS Left 01/16/2019    Procedure: LEFT FOOT HARDWARE REMOVAL, FIFTH METATARSAL , OPEN REDUCTION INTERNAL FIXATION, CALCANEAL OSTEOTOMY;  Surgeon: Ignacio Lord DPM;  Location: [facility];  Service: Podiatry   • SUBTALAR ARTHRODESIS Left 10/16/2019    Procedure: foot hardware removal, subtalar joint fusion  possible de/reattachment of achilles tendon        (c-arm);  Surgeon: Ignacio Lord DPM;  Location: [facility];  Service: Podiatry   • SUBTALAR ARTHRODESIS Left 09/30/2020    Procedure: subtalar, talonavicular joint arthrodesis.  Removal hardware.          (c-arm);  Surgeon: Ignacio Lord DPM;  Location: [facility];  Service: Podiatry;  Laterality: Left;   • TRANSESOPHAGEAL ECHOCARDIOGRAM (LAMONTE)      With color flow       Family History   Problem Relation Age of Onset   • Diabetes Other    • Heart disease Other    • Hypertension Other    • Heart disease Mother    • Stroke Mother    • Hypertension Mother    • Diabetes Sister    • Heart disease Sister    • Hypertension Sister    • Heart disease Sister    • Diabetes Sister    • Hypertension Sister    • Diabetes Sister    • Diabetes Sister    • Diabetes Sister    • Diabetes Sister        Social History     Socioeconomic History   • Marital status:    Tobacco Use   • Smoking status: Never   • Smokeless tobacco: Never   Vaping Use   • Vaping Use: Never used   Substance and Sexual Activity   • Alcohol use: No   • Drug use: No   • Sexual activity: Defer           Objective   Physical Exam  Vitals and nursing note reviewed.   Constitutional:       General: She is not in acute distress.     Appearance: She is obese. She is  ill-appearing.   HENT:      Head: Normocephalic and atraumatic.      Right Ear: Tympanic membrane, ear canal and external ear normal.      Left Ear: Tympanic membrane, ear canal and external ear normal.      Nose: Nose normal.      Mouth/Throat:      Mouth: Mucous membranes are moist.      Pharynx: Oropharynx is clear.   Eyes:      Pupils: Pupils are equal, round, and reactive to light.   Neck:      Meningeal: Brudzinski's sign absent.   Cardiovascular:      Rate and Rhythm: Normal rate and regular rhythm.      Pulses: Normal pulses.      Heart sounds: Normal heart sounds. No murmur heard.    No friction rub. No gallop.   Pulmonary:      Effort: Tachypnea present.      Breath sounds: Normal breath sounds and air entry. No decreased breath sounds, wheezing, rhonchi or rales.   Abdominal:      General: Abdomen is protuberant. Bowel sounds are normal.      Palpations: Abdomen is soft.      Tenderness: There is no abdominal tenderness. There is no guarding or rebound. Negative signs include Coombs's sign and McBurney's sign.   Musculoskeletal:      Cervical back: Normal range of motion and neck supple. No rigidity.      Right lower leg: No edema.      Left lower leg: No edema.        Legs:    Lymphadenopathy:      Cervical: No cervical adenopathy.   Skin:     General: Skin is warm and dry.      Findings: Erythema present.   Neurological:      General: No focal deficit present.      Mental Status: She is alert and oriented to person, place, and time.      GCS: GCS eye subscore is 4. GCS verbal subscore is 5. GCS motor subscore is 6.         Procedures          ED Course      Labs Reviewed   COMPREHENSIVE METABOLIC PANEL - Abnormal; Notable for the following components:       Result Value    Glucose 307 (*)     Creatinine 1.26 (*)     Sodium 128 (*)     Potassium 3.3 (*)     Chloride 92 (*)     CO2 21.0 (*)     Calcium 8.5 (*)     Albumin 3.0 (*)     eGFR 48.7 (*)     All other components within normal limits     Narrative:     GFR Normal >60  Chronic Kidney Disease <60  Kidney Failure <15     SINGLE HSTROPONIN T - Abnormal; Notable for the following components:    HS Troponin T 39 (*)     All other components within normal limits    Narrative:     High Sensitive Troponin T Reference Range:  <10.0 ng/L- Negative Female for AMI  <15.0 ng/L- Negative Male for AMI  >=10 - Abnormal Female indicating possible myocardial injury.  >=15 - Abnormal Male indicating possible myocardial injury.   Clinicians would have to utilize clinical acumen, EKG, Troponin, and serial changes to determine if it is an Acute Myocardial Infarction or myocardial injury due to an underlying chronic condition.        CBC WITH AUTO DIFFERENTIAL - Abnormal; Notable for the following components:    WBC 19.65 (*)     Hemoglobin 11.8 (*)     Neutrophil % 86.8 (*)     Lymphocyte % 5.1 (*)     Eosinophil % 0.1 (*)     Immature Grans % 1.5 (*)     Neutrophils, Absolute 17.08 (*)     Monocytes, Absolute 1.13 (*)     Immature Grans, Absolute 0.29 (*)     All other components within normal limits   BLOOD GAS, ARTERIAL - Abnormal; Notable for the following components:    pH, Arterial 7.451 (*)     pO2, Arterial 65.7 (*)     All other components within normal limits   COVID-19 AND FLU A/B, NP SWAB IN TRANSPORT MEDIA 8-12 HR TAT - Normal    Narrative:     Fact sheet for providers: https://www.fda.gov/media/489451/download    Fact sheet for patients: https://www.fda.gov/media/451967/download    Test performed by PCR.   MAGNESIUM - Normal   LACTIC ACID, PLASMA - Normal   BLOOD CULTURE   BLOOD CULTURE   RAINBOW DRAW    Narrative:     The following orders were created for panel order [place] Draw.  Procedure                               Abnormality         Status                     ---------                               -----------         ------                     Green Top (Gel)[[id number]]                                  Final result               Lavender  Top[[id number]]                                     Final result               Gold Top - SST[[id number]]                                   Final result               Light Blue Top[[id number]]                                   Final result                 Please view results for these tests on the individual orders.   BLOOD GAS, ARTERIAL   URINALYSIS W/ CULTURE IF INDICATED   POCT GLUCOSE FINGERSTICK   CBC AND DIFFERENTIAL    Narrative:     The following orders were created for panel order CBC & Differential.  Procedure                               Abnormality         Status                     ---------                               -----------         ------                     CBC Auto Differential[[id number]]        Abnormal            Final result               Scan Slide[[id number]]                                                                    Please view results for these tests on the individual orders.   GREEN TOP   LAVENDER TOP   GOLD TOP - SST   LIGHT BLUE TOP     XR Tibia Fibula 2 View Right    Result Date: 5/17/2023  Narrative: TECHNIQUE: AP and lateral views of the right foreleg were obtained. HISTORY: Cellulitis.  Pain along the medial leg with redness and swelling. COMPARISON: None. FINDINGS: No displaced fracture is seen.  Joint spaces are well preserved.  There is no appreciable soft tissue swelling.  No radiopaque foreign body or soft tissue gas.  Mild vascular calcification.     Impression: No acute radiographic abnormality.     XR Chest 1 View    Result Date: 5/17/2023  Narrative: HISTORY: Dizziness and weakness. FINDINGS: No infiltrate, effusion, or pneumothorax is seen.  Heart size and pulmonary vascularity are normal.  The lungs are clear.  The mediastinum, maria guadalupe, and visualized osseous structures are unremarkable.     Impression: No significant abnormality of the chest.     IR Insert Midline Without Port Pump 5 Plus    Result Date: 5/17/2023  Narrative: This procedure was auto-finalized with  no dictation required.                                         MDM    Final diagnoses:   Sepsis without acute organ dysfunction, due to unspecified organism   Cellulitis of right lower extremity   Type 2 diabetes mellitus with hyperglycemia, unspecified whether long term insulin use       ED Disposition  ED Disposition     ED Disposition   Decision to Admit    Condition   --    Comment   Level of Care: Telemetry [5]   Admitting Physician: JUDY BARRETT [[id number]]   Attending Physician: JUDY BARRETT [[id number]]   Patient Class: Inpatient [101]               No follow-up provider specified.       Medication List      No changes were made to your prescriptions during this visit.          True Crocker MD  05/17/23 1102      Electronically signed by True Crocker MD at 05/17/23 1102     Trudy Cates, RN at 05/17/23 1240          Nursing report ED to floor  Ariana Martinez  63 y.o.  female    HPI:   Chief Complaint   Patient presents with   • Weakness - Generalized   • Fever       Admitting doctor:   Judy Barrett DO    Consulting provider(s):  Consults       Date and Time Order Name Status Description    5/17/2023 11:01 AM Hospitalist (on-call MD unless specified)               Admitting diagnosis:   The primary encounter diagnosis was Sepsis without acute organ dysfunction, due to unspecified organism. Diagnoses of Cellulitis of right lower extremity and Type 2 diabetes mellitus with hyperglycemia, unspecified whether long term insulin use were also pertinent to this visit.    Code status:   Current Code Status       Date Active Code Status Order ID Comments User Context       5/17/2023 1143 CPR (Attempt to Resuscitate) [id number]  Judy Barrett DO ED        Question Answer    Code Status (Patient has no pulse and is not breathing) CPR (Attempt to Resuscitate)    Medical Interventions (Patient has pulse or is breathing) Full Support    Level Of Support Discussed With Patient                     Allergies:   Seroquel [quetiapine], Avandia [rosiglitazone], Morphine and related, and Oxycodone    Intake and Output    Intake/Output Summary (Last 24 hours) at 5/17/2023 1240  Last data filed at 5/17/2023 1239  Gross per 24 hour   Intake 600 ml   Output --   Net 600 ml       Weight:       05/17/23  0801   Weight: 116 kg (255 lb)       Most recent vitals:   Vitals:    05/17/23 1011 05/17/23 1049 05/17/23 1130 05/17/23 1229   BP: 149/64 120/58 123/59 137/64   Pulse: 83 82 82 76   Resp: 24 19 20 20   Temp:   99.7 °F (37.6 °C)    TempSrc:   Oral    SpO2: 96% 95% 98% 95%   Weight:       Height:         Oxygen Therapy: 2L per nc    Active LDAs/IV Access:   Lines, Drains & Airways       Active LDAs       Name Placement date Placement time Site Days    Midline Catheter - Double Lumen 05/17/23 Right Cephalic 05/17/23  0943  -- less than 1                    Labs (abnormal labs have a star):   Labs Reviewed   COMPREHENSIVE METABOLIC PANEL - Abnormal; Notable for the following components:       Result Value    Glucose 307 (*)     Creatinine 1.26 (*)     Sodium 128 (*)     Potassium 3.3 (*)     Chloride 92 (*)     CO2 21.0 (*)     Calcium 8.5 (*)     Albumin 3.0 (*)     eGFR 48.7 (*)     All other components within normal limits    Narrative:     GFR Normal >60  Chronic Kidney Disease <60  Kidney Failure <15     SINGLE HSTROPONIN T - Abnormal; Notable for the following components:    HS Troponin T 39 (*)     All other components within normal limits    Narrative:     High Sensitive Troponin T Reference Range:  <10.0 ng/L- Negative Female for AMI  <15.0 ng/L- Negative Male for AMI  >=10 - Abnormal Female indicating possible myocardial injury.  >=15 - Abnormal Male indicating possible myocardial injury.   Clinicians would have to utilize clinical acumen, EKG, Troponin, and serial changes to determine if it is an Acute Myocardial Infarction or myocardial injury due to an underlying chronic condition.        URINALYSIS W/  CULTURE IF INDICATED - Abnormal; Notable for the following components:    Appearance, UA Cloudy (*)     Glucose, UA >=1000 mg/dL (3+) (*)     Ketones, UA Trace (*)     Blood, UA Trace (*)     Protein, UA >=300 mg/dL (3+) (*)     All other components within normal limits    Narrative:     In absence of clinical symptoms, the presence of pyuria, bacteria, and/or nitrites on the urinalysis result does not correlate with infection.   CBC WITH AUTO DIFFERENTIAL - Abnormal; Notable for the following components:    WBC 19.65 (*)     Hemoglobin 11.8 (*)     Neutrophil % 86.8 (*)     Lymphocyte % 5.1 (*)     Eosinophil % 0.1 (*)     Immature Grans % 1.5 (*)     Neutrophils, Absolute 17.08 (*)     Monocytes, Absolute 1.13 (*)     Immature Grans, Absolute 0.29 (*)     All other components within normal limits   BLOOD GAS, ARTERIAL - Abnormal; Notable for the following components:    pH, Arterial 7.451 (*)     pO2, Arterial 65.7 (*)     All other components within normal limits   URINALYSIS, MICROSCOPIC ONLY - Abnormal; Notable for the following components:    RBC, UA 0-2 (*)     Squamous Epithelial Cells, UA 3-5 (*)     All other components within normal limits   POCT GLUCOSE FINGERSTICK - Abnormal; Notable for the following components:    Glucose 319 (*)     All other components within normal limits   COVID-19 AND FLU A/B, NP SWAB IN TRANSPORT MEDIA 8-12 HR TAT - Normal    Narrative:     Fact sheet for providers: https://www.fda.gov/media/440392/download    Fact sheet for patients: https://www.fda.gov/media/108699/download    Test performed by PCR.   MAGNESIUM - Normal   LACTIC ACID, PLASMA - Normal   BLOOD CULTURE   BLOOD CULTURE   RAINBOW DRAW    Narrative:     The following orders were created for panel order [place] Draw.  Procedure                               Abnormality         Status                     ---------                               -----------         ------                     Green Top (Gel)[[id number]]                                   Final result               Lavender Top[[id number]]                                     Final result               Gold Top - SST[[id number]]                                   Final result               Light Blue Top[[id number]]                                   Final result                 Please view results for these tests on the individual orders.   BLOOD GAS, ARTERIAL   POCT GLUCOSE FINGERSTICK   CBC AND DIFFERENTIAL    Narrative:     The following orders were created for panel order CBC & Differential.  Procedure                               Abnormality         Status                     ---------                               -----------         ------                     CBC Auto Differential[[id number]]        Abnormal            Final result               Scan Slide[[id number]]                                                                    Please view results for these tests on the individual orders.   GREEN TOP   LAVENDER TOP   GOLD TOP - SST   LIGHT BLUE TOP       Meds given in ED:   Medications   sodium chloride 0.9 % flush 10 mL (has no administration in time range)   sodium chloride 0.9 % infusion (100 mL/hr Intravenous Currently Infusing 5/17/23 1012)   acetaminophen (TYLENOL) tablet 650 mg (650 mg Oral Given 5/17/23 0829)   piperacillin-tazobactam (ZOSYN) 3.375 g/100 mL 0.9% NS IVPB (mbp) (0 g Intravenous Stopped 5/17/23 1239)   vancomycin 2250 mg/500 mL 0.9% NS IVPB ([facility]) (0 mg Intravenous Stopped 5/17/23 1239)   insulin regular (humuLIN R,novoLIN R) injection 4 Units (4 Units Subcutaneous Given 5/17/23 1128)   potassium chloride (MICRO-K) CR capsule 20 mEq (20 mEq Oral Given 5/17/23 1128)   ondansetron (ZOFRAN) injection 4 mg (4 mg Intravenous Given 5/17/23 1127)   HYDROmorphone (DILAUDID) injection 0.5 mg (0.5 mg Intravenous Given 5/17/23 1128)     sodium chloride, 100 mL/hr, Last Rate: 100 mL/hr (05/17/23 1012)         NIH Stroke Scale:        Isolation/Infection(s):  No active isolations   No active infections     COVID Testing  Collected yes  Resulted negative    Nursing report ED to floor:  Mental status: disoriented to time  Ambulatory status: two assist, 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  Precautions: falls    ED nurse phone [phone]- [phone]    Electronically signed by Trudy Cates RN at 05/17/23 1242         Lab Results (last 24 hours)     Procedure Component Value Units Date/Time    POC Glucose Once [[id number]]  (Abnormal) Collected: 05/18/23 0653    Specimen: Blood Updated: 05/18/23 0723     Glucose 229 mg/dL      Comment: RN NotifiedOperator: [id number] NED AGRAWALITAMeter ID: [id number]       Comprehensive Metabolic Panel [[id number]]  (Abnormal) Collected: 05/18/23 0556    Specimen: Blood Updated: 05/18/23 0622     Glucose 247 mg/dL      BUN 15 mg/dL      Creatinine 1.10 mg/dL      Sodium 130 mmol/L      Potassium 3.2 mmol/L      Chloride 98 mmol/L      CO2 23.0 mmol/L      Calcium 8.3 mg/dL      Total Protein 5.9 g/dL      Albumin 2.7 g/dL      ALT (SGPT) 11 U/L      AST (SGOT) 12 U/L      Alkaline Phosphatase 84 U/L      Total Bilirubin 0.6 mg/dL      Globulin 3.2 gm/dL      A/G Ratio 0.8 g/dL      BUN/Creatinine Ratio 13.6     Anion Gap 9.0 mmol/L      eGFR 57.3 mL/min/1.73     Narrative:      GFR Normal >60  Chronic Kidney Disease <60  Kidney Failure <15      CBC Auto Differential [[id number]]  (Abnormal) Collected: 05/18/23 0556    Specimen: Blood Updated: 05/18/23 0601     WBC 16.99 10*3/mm3      RBC 3.75 10*6/mm3      Hemoglobin 10.7 g/dL      Hematocrit 31.8 %      MCV 84.8 fL      MCH 28.5 pg      MCHC 33.6 g/dL      RDW 13.3 %      RDW-SD 41.6 fl      MPV 9.6 fL      Platelets 233 10*3/mm3      Neutrophil % 84.0 %      Lymphocyte % 5.8 %      Monocyte % 8.5 %      Eosinophil % 0.1 %      Basophil % 0.7 %      Immature Grans % 0.9 %      Neutrophils, Absolute 14.26 10*3/mm3      Lymphocytes, Absolute 0.99 10*3/mm3      Monocytes, Absolute  1.45 10*3/mm3      Eosinophils, Absolute 0.02 10*3/mm3      Basophils, Absolute 0.12 10*3/mm3      Immature Grans, Absolute 0.15 10*3/mm3      nRBC 0.0 /100 WBC     Lactic Acid, Plasma [[id number]]  (Normal) Collected: 05/18/23 0000    Specimen: Blood Updated: 05/18/23 0019     Lactate 1.2 mmol/L     POC Glucose Once [[id number]]  (Abnormal) Collected: 05/17/23 1924    Specimen: Blood Updated: 05/17/23 2011     Glucose 295 mg/dL      Comment: RN NotifiedOperator: [id number] [facility] [id number]       Potassium [[id number]]  (Normal) Collected: 05/17/23 1728    Specimen: Blood Updated: 05/17/23 1749     Potassium 4.1 mmol/L      Comment: Slight hemolysis detected by analyzer. Results may be affected.       POC Glucose Once [[id number]]  (Abnormal) Collected: 05/17/23 1617    Specimen: Blood Updated: 05/17/23 1631     Glucose 303 mg/dL      Comment: RN NotifiedOperator: [id number] DACOSTA ALISEMeter ID: [id number]       TSH [[id number]]  (Normal) Collected: 05/17/23 0954    Specimen: Blood Updated: 05/17/23 1504     TSH 1.170 uIU/mL     Hemoglobin A1c [[id number]]  (Abnormal) Collected: 05/17/23 0954    Specimen: Blood Updated: 05/17/23 1456     Hemoglobin A1C 9.80 %     Narrative:      Hemoglobin A1C Ranges:    Increased Risk for Diabetes  5.7% to 6.4%  Diabetes                     >= 6.5%  Diabetic Goal                < 7.0%    Blood Culture - Blood, Hand, Left [[id number]] Collected: 05/17/23 1151    Specimen: Blood from Hand, Left Updated: 05/17/23 1203    Urinalysis, Microscopic Only - Straight Cath [[id number]]  (Abnormal) Collected: 05/17/23 1040    Specimen: Urine from Straight Cath Updated: 05/17/23 1148     RBC, UA 0-2 /HPF      WBC, UA 0-2 /HPF      Comment: Urine culture not indicated.        Bacteria, UA None Seen /HPF      Squamous Epithelial Cells, UA 3-5 /HPF      Hyaline Casts, UA 0-2 /LPF      Fine Granular Casts, UA 7-12 /LPF      Methodology Manual Light Microscopy    POC Glucose Once  [[id number]]  (Abnormal) Collected: 05/17/23 0832    Specimen: Blood Updated: 05/17/23 1123     Glucose 319 mg/dL      Comment: RN NotifiedOperator: [id number] PARKINSON LEAMeter ID: [id number]       Urinalysis With Culture If Indicated - Straight Cath [[id number]]  (Abnormal) Collected: 05/17/23 1040    Specimen: Urine from Straight Cath Updated: 05/17/23 1122     Color, UA Yellow     Appearance, UA Cloudy     pH, UA 5.5     Specific Gravity, UA 1.021     Glucose, UA >=1000 mg/dL (3+)     Ketones, UA Trace     Bilirubin, UA Negative     Blood, UA Trace     Protein, UA >=300 mg/dL (3+)     Leuk Esterase, UA Negative     Nitrite, UA Negative     Urobilinogen, UA 1.0 E.U./dL    Narrative:      In absence of clinical symptoms, the presence of pyuria, bacteria, and/or nitrites on the urinalysis result does not correlate with infection.    [facility] [[id number]] Collected: 05/17/23 0954    Specimen: Blood Updated: 05/17/23 1100    Narrative:      The following orders were created for panel order [facility].  Procedure                               Abnormality         Status                     ---------                               -----------         ------                     Green Top (Gel)[[id number]]                                  Final result               Lavender Top[[id number]]                                     Final result               Gold Top - SST[[id number]]                                   Final result               Light Blue Top[[id number]]                                   Final result                 Please view results for these tests on the individual orders.    Green Top (Gel) [[id number]] Collected: 05/17/23 0954    Specimen: Blood Updated: 05/17/23 1100     Extra Tube Hold for add-ons.     Comment: Auto resulted.       Gold Top - SST [[id number]] Collected: 05/17/23 0954    Specimen: Blood Updated: 05/17/23 1100     Extra Tube Hold for add-ons.     Comment: Auto resulted.       Lavender Top  [[id number]] Collected: 05/17/23 0954    Specimen: Blood Updated: 05/17/23 1100     Extra Tube hold for add-on     Comment: Auto resulted       Light Blue Top [[id number]] Collected: 05/17/23 0954    Specimen: Blood Updated: 05/17/23 1100     Extra Tube Hold for add-ons.     Comment: Auto resulted       Single High Sensitivity Troponin T [[id number]]  (Abnormal) Collected: 05/17/23 0954    Specimen: Blood Updated: 05/17/23 1043     HS Troponin T 39 ng/L     Narrative:      High Sensitive Troponin T Reference Range:  <10.0 ng/L- Negative Female for AMI  <15.0 ng/L- Negative Male for AMI  >=10 - Abnormal Female indicating possible myocardial injury.  >=15 - Abnormal Male indicating possible myocardial injury.   Clinicians would have to utilize clinical acumen, EKG, Troponin, and serial changes to determine if it is an Acute Myocardial Infarction or myocardial injury due to an underlying chronic condition.         Magnesium [[id number]]  (Normal) Collected: 05/17/23 0954    Specimen: Blood Updated: 05/17/23 1038     Magnesium 1.6 mg/dL     Comprehensive Metabolic Panel [[id number]]  (Abnormal) Collected: 05/17/23 0954    Specimen: Blood Updated: 05/17/23 1038     Glucose 307 mg/dL      BUN 14 mg/dL      Creatinine 1.26 mg/dL      Sodium 128 mmol/L      Potassium 3.3 mmol/L      Chloride 92 mmol/L      CO2 21.0 mmol/L      Calcium 8.5 mg/dL      Total Protein 6.3 g/dL      Albumin 3.0 g/dL      ALT (SGPT) 11 U/L      AST (SGOT) 13 U/L      Alkaline Phosphatase 90 U/L      Total Bilirubin 0.7 mg/dL      Globulin 3.3 gm/dL      A/G Ratio 0.9 g/dL      BUN/Creatinine Ratio 11.1     Anion Gap 15.0 mmol/L      eGFR 48.7 mL/min/1.73     Narrative:      GFR Normal >60  Chronic Kidney Disease <60  Kidney Failure <15      Lactic Acid, Plasma [[id number]]  (Normal) Collected: 05/17/23 0954    Specimen: Blood Updated: 05/17/23 1032     Lactate 1.7 mmol/L     Blood Gas, Arterial - [[id number]]  (Abnormal) Collected: 05/17/23 1026     Specimen: Arterial Blood Updated: 05/17/23 1026     Site Left Radial     Tonio's Test N/A     pH, Arterial 7.451 pH units      Comment: 83 Value above reference range        pCO2, Arterial 36.0 mm Hg      pO2, Arterial 65.7 mm Hg      Comment: 84 Value below reference range        HCO3, Arterial 25.1 mmol/L      Base Excess, Arterial 1.3 mmol/L      O2 Saturation, Arterial 94.8 %      Barometric Pressure for Blood Gas 747 mmHg      Modality Nasal Cannula     Flow Rate 2.0 lpm      Ventilator Mode NA     Collected by DAYA horn     Comment: Meter: [id number]     :  [id number]       CBC & Differential [[id number]]  (Abnormal) Collected: 05/17/23 0954    Specimen: Blood Updated: 05/17/23 1011    Narrative:      The following orders were created for panel order CBC & Differential.  Procedure                               Abnormality         Status                     ---------                               -----------         ------                     CBC Auto Differential[[id number]]        Abnormal            Final result               Scan Slide[[id number]]                                                                    Please view results for these tests on the individual orders.    CBC Auto Differential [[id number]]  (Abnormal) Collected: 05/17/23 0954    Specimen: Blood Updated: 05/17/23 1011     WBC 19.65 10*3/mm3      RBC 4.01 10*6/mm3      Hemoglobin 11.8 g/dL      Hematocrit 34.1 %      MCV 85.0 fL      MCH 29.4 pg      MCHC 34.6 g/dL      RDW 13.3 %      RDW-SD 41.4 fl      MPV 9.8 fL      Platelets 249 10*3/mm3      Neutrophil % 86.8 %      Lymphocyte % 5.1 %      Monocyte % 5.8 %      Eosinophil % 0.1 %      Basophil % 0.7 %      Immature Grans % 1.5 %      Neutrophils, Absolute 17.08 10*3/mm3      Lymphocytes, Absolute 1.01 10*3/mm3      Monocytes, Absolute 1.13 10*3/mm3      Eosinophils, Absolute 0.01 10*3/mm3      Basophils, Absolute 0.13 10*3/mm3      Immature Grans, Absolute 0.29 10*3/mm3       nRBC 0.0 /100 WBC     Blood Culture - Blood, Arm, Right [[id number]] Collected: 05/17/23 0954    Specimen: Blood from Arm, Right Updated: 05/17/23 1004        Imaging Results (Last 24 Hours)     Procedure Component Value Units Date/Time    US Guided Vascular Access [[id number]] Collected: 05/17/23 1139     Updated: 05/17/23 1143    Narrative:      Ultrasound guidance vascular    FINDINGS:  Real-time ultrasound imaging was provided for vascular access.  Please refer to  procedure note for real-time exam findings.  The right cephalic vein was found  to be patent and compressible.  Access under direct sonographic visualization.  Permanent saved images sent to the PACS for documentation.          ECG/EMG Results (last 24 hours)     Procedure Component Value Units Date/Time    SCANNED EKG [[id number]] Resulted: 05/17/23     Updated: 05/17/23 2031          Physician Progress Notes (last 24 hours)  Notes from 05/17/23 0957 through 05/18/23 0957   No notes of this type exist for this encounter.         Medical Student Notes (last 24 hours)  Notes from 05/17/23 0957 through 05/18/23 0957   No notes of this type exist for this encounter.         Consult Notes (last 24 hours)  Notes from 05/17/23 0957 through 05/18/23 0957   No notes of this type exist for this encounter.

## 2023-05-18 NOTE — PLAN OF CARE
Goal Outcome Evaluation:                   VSS through shift, pain controlled with PRN medication, urinary retention, drained 900 ml from straight cath, lactate down to 1.2, will continue to monitor.

## 2023-05-18 NOTE — THERAPY EVALUATION
Patient Name: Ariana Martinez  : 1960    MRN: [id number]                              Today's Date: 2023       Admit Date: 2023    Visit Dx:     ICD-10-CM ICD-9-CM   1. Sepsis without acute organ dysfunction, due to unspecified organism  A41.9 038.9     995.91   2. Cellulitis of right lower extremity  L03.115 682.6   3. Type 2 diabetes mellitus with hyperglycemia, unspecified whether long term insulin use  E11.65 250.00   4. Impaired mobility and activities of daily living  Z74.09 V49.89    Z78.9    5. Impaired physical mobility  Z74.09 781.99     Patient Active Problem List   Diagnosis   • 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, with long-term current use of insulin   • Closed nondisplaced fracture of fifth left metatarsal bone   • MAURICIO (generalized anxiety disorder)   • Depression, major, recurrent, moderate (HCC)   • GERD without esophagitis   • Long term prescription opiate use   • Mixed hyperlipidemia   • Vitamin D deficiency   • Seasonal allergic rhinitis   • Restrictive lung disease secondary to obesity   • Adult body mass index 37.0-37.9   • Snoring   • Class 3 severe obesity due to excess calories without serious comorbidity with body mass index (BMI) of 40.0 to 44.9 in adult (HCC)   • (HFpEF) heart failure with preserved ejection fraction   • Type 2 diabetes mellitus with hyperglycemia, with long-term current use of insulin ([facility])   • Cyanocobalamin deficiency   • Coronary artery disease of native artery of native heart with stable angina pectoris   • Hypertension   • Meniere's disease   • Gastroparesis   • Pulmonary hypertension ([facility])   • Pes cavus   • Primary osteoarthritis involving multiple joints   • Generalized anxiety disorder   • Chronic right-sided low back pain with right-sided sciatica   • Chest pain   • Adverse effect of iron   • Chest pain due to myocardial ischemia   • Nonrheumatic tricuspid valve regurgitation   • Iron deficiency anemia due to  chronic blood loss   • Malaise and fatigue   • Ankle arthritis   • Urinary retention   • Endocarditis   • Essential hypertension   • Occlusion and stenosis of bilateral carotid arteries   • S/P BKA (below knee amputation) unilateral, left (HCC)   • Phantom pain after amputation of lower extremity   • S/P CABG (coronary artery bypass graft)   • Severe malnutrition   • TIA (transient ischemic attack)   • Coronary artery abnormality   • Elevated d-dimer   • Neuropathy   • Chest pain, unspecified type   • Acute pain of right shoulder   • Rotator cuff syndrome, right   • Acquired hypothyroidism   • Bilateral leg edema   • Left elbow pain   • Gastritis   • Olecranon bursitis, left elbow   • Sepsis without acute organ dysfunction, due to unspecified organism     Past Medical History:   Diagnosis Date   • Acute bacterial endocarditis 3/13/2021   • Angina, class IV    • Anxiety    • Anxiety and depression    • Arthritis    • Benign paroxysmal positional vertigo    • Bladder disorder     has bladder stimulator   • Carpal tunnel syndrome    • CHF (congestive heart failure)    • Chronic pain    • Coronary atherosclerosis     hx CABG 2005.  is followed by Dr Kwon   • Depression    • Diabetes mellitus     Type 2, controlled   • Diabetic polyneuropathy    • Disease of thyroid gland    • Elevated cholesterol    • Female stress incontinence    • Foot pain, left    • Full dentures    • Gastroparesis    • GERD (gastroesophageal reflux disease)    • Hyperlipidemia    • Hypertension    • Low back pain    • Malaise and fatigue    • Multiple joint pain    • Obesity     Refuses to be weighed   • Occlusion and stenosis of bilateral carotid arteries 6/18/2021   • Otalgia     Both   • Perforation of tympanic membrane     Left   • Postoperative wound infection    • Vitamin D deficiency    • Wears glasses     reading     Past Surgical History:   Procedure Laterality Date   • ABDOMINAL SURGERY     • AMPUTATION FOOT     • ANGIOPLASTY       coronary   • ANKLE ARTHROSCOPY Left 02/26/2021    Procedure: Left foot hardware removal, ankle arthroscopy, bone grafting , left foot exostectomy;  Surgeon: Ignacio Lord DPM;  Location: [facility] OR;  Service: Podiatry;  Laterality: Left;   • BREAST BIOPSY Right    • CARDIAC CATHETERIZATION     • CARDIAC CATHETERIZATION N/A 06/20/2017    Procedure: Right Heart Cath;  Surgeon: Can Kwon MD PhD;  Location: [facility] CATH INVASIVE LOCATION;  Service:    • CARDIAC CATHETERIZATION N/A 02/18/2020    Procedure: Left Heart Cath;  Surgeon: Catalina Cooper MD;  Location: [facility] CATH INVASIVE LOCATION;  Service: Cardiology;  Laterality: N/A;   • CARDIAC CATHETERIZATION N/A 04/06/2022    Procedure: Left Heart Cath;  Surgeon: Sheryl Navas MD;  Location: [facility] CATH INVASIVE LOCATION;  Service: Cardiology;  Laterality: N/A;   • CARPAL TUNNEL RELEASE     • CHOLECYSTECTOMY     • COLONOSCOPY N/A 06/24/2020    Procedure: COLONOSCOPY;  Surgeon: Julián Maldonado MD;  Location: [facility] ENDOSCOPY;  Service: Gastroenterology;  Laterality: N/A;   • CORONARY ARTERY BYPASS GRAFT  2005   • ENDOSCOPY N/A 10/19/2018    Procedure: ESOPHAGOGASTRODUODENOSCOPY possible dilation;  Surgeon: Julián Maldonado MD;  Location: [facility] ENDOSCOPY;  Service: Gastroenterology   • ENDOSCOPY N/A 06/24/2020    Procedure: ESOPHAGOGASTRODUODENOSCOPY WED appt please;  Surgeon: Julián Maldonado MD;  Location: [facility] ENDOSCOPY;  Service: Gastroenterology;  Laterality: N/A;   • ENDOSCOPY N/A 06/10/2022    Procedure: ESOPHAGOGASTRODUODENOSCOPY   room 380;  Surgeon: Jeremiah Wilkins MD;  Location: [facility] ENDOSCOPY;  Service: Gastroenterology;  Laterality: N/A;   • ENDOSCOPY N/A 1/10/2023    Procedure: ESOPHAGOGASTRODUODENOSCOPY;  Surgeon: Jeremiah Wilkins MD;  Location: [facility] ENDOSCOPY;  Service: Gastroenterology;  Laterality: N/A;   • ENDOSCOPY AND COLONOSCOPY     • FOOT SURGERY      Toes   • FOOT SURGERY     • GASTRIC BANDING      Revision,  laparoscopic   • HYSTERECTOMY     • INCISION AND DRAINAGE LEG Left 03/12/2021    Procedure: Left ankle arthroscopic irrigation and debridement, screw removal;  Surgeon: Ignacio Lord DPM;  Location: [facility];  Service: Podiatry;  Laterality: Left;   • MOUTH SURGERY     • SALPINGO OOPHORECTOMY     • SHOULDER SURGERY     • SUBTALAR ARTHRODESIS Left 01/16/2019    Procedure: LEFT FOOT HARDWARE REMOVAL, FIFTH METATARSAL , OPEN REDUCTION INTERNAL FIXATION, CALCANEAL OSTEOTOMY;  Surgeon: Ignacio Lord DPM;  Location: [facility];  Service: Podiatry   • SUBTALAR ARTHRODESIS Left 10/16/2019    Procedure: foot hardware removal, subtalar joint fusion  possible de/reattachment of achilles tendon        (c-arm);  Surgeon: Ignacio Lord DPM;  Location: [facility];  Service: Podiatry   • SUBTALAR ARTHRODESIS Left 09/30/2020    Procedure: subtalar, talonavicular joint arthrodesis.  Removal hardware.          (c-arm);  Surgeon: Ignacio Lord DPM;  Location: [facility];  Service: Podiatry;  Laterality: Left;   • TRANSESOPHAGEAL ECHOCARDIOGRAM (LAMONTE)      With color flow      General Information     Row Name 05/18/23 1110          Physical Therapy Time and Intention    Document Type evaluation  -AME     Mode of Treatment co-treatment;physical therapy;occupational therapy  -     Row Name 05/18/23 1110          General Information    Patient Profile Reviewed yes  -AME     Prior Level of Function independent:;all household mobility;gait;transfer;bed mobility;ADL's;home management;cooking;cleaning;dependent:;driving  -AME     Existing Precautions/Restrictions fall  -AME     Barriers to Rehab medically complex  -AME     Row Name 05/18/23 1110          Living Environment    People in Home spouse  -AME     Row Name 05/18/23 1110          Home Main Entrance    Number of Stairs, Main Entrance other (see comments)  has ramp  -AME     Row Name 05/18/23 1110          Stairs Within Home, Primary    Number of Stairs, Within Home,  Primary none  -     Stairs Comment, Within Home, Primary reports used RW at home prior with prosthesis LLE; independent with ADL's;spouse provided SBA when patient bathing;patient /spouse shared housekeeping tasks  -     Row Name 05/18/23 1110          Cognition    Orientation Status (Cognition) oriented x 4  -     Row Name 05/18/23 1110          Safety Issues, Functional Mobility    Impairments Affecting Function (Mobility) endurance/activity tolerance;strength;pain  -           User Key  (r) = Recorded By, (t) = Taken By, (c) = Cosigned By    Initials Name Provider Type    Aure Shah PT Physical Therapist               Mobility     Row Name 05/18/23 1110          Bed Mobility    Bed Mobility supine-sit;sit-supine;scooting/bridging  -     Scooting/Bridging [place] (Bed Mobility) standby assist;verbal cues  -     Supine-Sit [place] (Bed Mobility) moderate assist (50% patient effort);2 person assist  -     Sit-Supine [place] (Bed Mobility) moderate assist (50% patient effort);2 person assist  -     Assistive Device (Bed Mobility) head of bed elevated;bed rails  -     Row Name 05/18/23 1110          Transfers    Comment, (Transfers) deferred transfers/standing 2/2 LE pain and patient does not have prosthesis at hospital  -     Row Name 05/18/23 1110          Bed-Chair Transfer    Bed-Chair [place] (Transfers) unable to assess  -[facility] Name 05/18/23 1110          Sit-Stand Transfer    Sit-Stand [place] (Transfers) unable to assess  -[facility] Name 05/18/23 1110          Gait/Stairs (Locomotion)    [place] Level (Gait) unable to assess  -           User Key  (r) = Recorded By, (t) = Taken By, (c) = Cosigned By    Initials Name Provider Type    Aure Shah, PT Physical Therapist               Obj/Interventions     Row Name 05/18/23 1110          Range of Motion Comprehensive    Comment, General Range of Motion ROM WFL BLE  -[facility] Name 05/18/23  1110          Strength Comprehensive (MMT)    Comment, General Manual Muscle Testing (MMT) Assessment BLE: RLE: could not give maximum resistance 2/2 pain; observed at least 3+/5 grossly ;LLE: grossly 4-/5  -AME     Row Name 05/18/23 1110          Balance    Balance Assessment sitting static balance;sitting dynamic balance  -AME     Static Sitting Balance independent  -AME     Dynamic Sitting Balance standby assist  -AME     Position, Sitting Balance sitting edge of bed  -AME     Row Name 05/18/23 1110          Sensory Assessment (Somatosensory)    Sensory Assessment (Somatosensory) LE sensation intact  except numbness R foot  -AME           User Key  (r) = Recorded By, (t) = Taken By, (c) = Cosigned By    Initials Name Provider Type    AME Aure Villaseñor, PT Physical Therapist               Goals/Plan     Row Name 05/18/23 1110          Bed Mobility Goal 1 (PT)    Activity/Assistive Device (Bed Mobility Goal 1, PT) bed mobility activities, all  -AME     [place] Level/Cues Needed (Bed Mobility Goal 1, PT) standby assist;independent  -AME     Time Frame (Bed Mobility Goal 1, PT) by discharge  -AME     Progress/Outcomes (Bed Mobility Goal 1, PT) goal not met  -[facility] Name 05/18/23 1110          Transfer Goal 1 (PT)    Activity/Assistive Device (Transfer Goal 1, PT) sit-to-stand/stand-to-sit;bed-to-chair/chair-to-bed  -AME     [place] Level/Cues Needed (Transfer Goal 1, PT) contact guard required;standby assist;modified independence  -AME     Time Frame (Transfer Goal 1, PT) by discharge  -AME     Progress/Outcome (Transfer Goal 1, PT) goal not met  -     Row Name 05/18/23 1110          Gait Training Goal 1 (PT)    Activity/Assistive Device (Gait Training Goal 1, PT) gait (walking locomotion);decrease fall risk  -AME     [place] Level (Gait Training Goal 1, PT) contact guard required;standby assist;modified independence  -AME     Distance (Gait Training Goal 1, PT) 50ft or more each trip  -AME     Time Frame (Gait  Training Goal 1, PT) 1 week  -     Progress/Outcome (Gait Training Goal 1, PT) goal not met  -     Row Name 05/18/23 1110          ROM Goal 1 (PT)    ROM Goal 1 (PT) Pt will tolerate LE exercises OOB in chair with VSS  -     Time Frame (ROM Goal 1, PT) by discharge  -     Progress/Outcome (ROM Goal 1, PT) goal not met  -     Row Name 05/18/23 1110          Therapy Assessment/Plan (PT)    Planned Therapy Interventions (PT) balance training;bed mobility training;gait training;home exercise program;patient/family education;stair training;strengthening;transfer training;wheelchair management/propulsion training  -           User Key  (r) = Recorded By, (t) = Taken By, (c) = Cosigned By    Initials Name Provider Type    Aure Shah, PT Physical Therapist               Clinical Impression     Row Name 05/18/23 1110          Pain    Pretreatment Pain Rating 10/10  -     Pain Location - Side/Orientation Bilateral  -     Pain Location lower  -     Pain Location - extremity  -     Pain Intervention(s) Medication (See MAR);Repositioned;Rest  -     Additional Documentation Pain Scale: Numbers Pre/Post-Treatment (Group)  -     Row Name 05/18/23 1110          Plan of Care Review    Plan of Care Reviewed With patient  -AME     Outcome Evaluation PT evaluation completed as co-eval with OT. Pt in fowlers upon arrival complains of 10/10 pain in legs and later nausea(RN notified and gave meds for both). Patient agreed to bed and EOB eval. Reported she could not stand this visit due to pain and did not have prothesis  for LLE at the hospital. Patient transferred supine to sit and sit to supine with moderate assistance. Patient sat EOB 15 minutes with SBA for sitting balance. Function limited by decreased strength, balance,and tolerance for functional mobility and activities. Pt will benefit from PT to regain lost function as pt improves medically. Anticipate home with assist at discharge with HHPT.  -AME      Row Name 05/18/23 1110          Therapy Assessment/Plan (PT)    Patient/Family Therapy Goals Statement (PT) return to OF  -     Rehab Potential (PT) good, to achieve stated therapy goals  -     Criteria for Skilled Interventions Met (PT) yes;meets criteria;skilled treatment is necessary  -     Therapy Frequency (PT) other (see comments)  5-7 days/wk  -     Predicted Duration of Therapy Intervention (PT) until discharge or goals met  -     Row Name 05/18/23 1110          Vital Signs    Pre Systolic BP Rehab 118  -AME     Pre Treatment Diastolic BP 60  -AME     Post Systolic BP Rehab 131  -AME     Post Treatment Diastolic BP 62  -AME     Pretreatment Heart Rate (beats/min) 80  -AME     Intratreatment Heart Rate (beats/min) 81  -AME     Posttreatment Heart Rate (beats/min) 83  -AME     Pre SpO2 (%) 93  -AME     O2 Delivery Pre Treatment supplemental O2  3lpm  -AME     Intra SpO2 (%) 87  -AME     O2 Delivery Intra Treatment supplemental O2  -AME     Post SpO2 (%) 93  -     O2 Delivery Post Treatment nasal cannula  -AME     Pre Patient Position Supine  -AME     Intra Patient Position Sitting  -     Post Patient Position Supine  -     Recovery Time 1 min  -     Row Name 05/18/23 1110          Positioning and Restraints    Pre-Treatment Position in bed  -AME     Post Treatment Position bed  -AME     In Bed call light within reach;encouraged to call for assist;exit alarm on;sitting  sitting HOB elevated eating lunch  -           User Key  (r) = Recorded By, (t) = Taken By, (c) = Cosigned By    Initials Name Provider Type    AME Aure Villaseñor, PT Physical Therapist               Outcome Measures     Row Name 05/18/23 1110 05/18/23 0714       How much help from another person do you currently need...    Turning from your back to your side while in flat bed without using bedrails? 2  -AME 2  -JH    Moving from lying on back to sitting on the side of a flat bed without bedrails? 2  -AME 2  -JH    Moving to and from a bed  to a chair (including a wheelchair)? 2  - 2  -    Standing up from a chair using your arms (e.g., wheelchair, bedside chair)? 2  - 2  -    Climbing 3-5 steps with a railing? 1  - 2  -    To walk in hospital room? 2  -AME 2  -JH    AM-PAC 6 Clicks Score (PT) 11  - 12  -    Highest level of mobility 4 --> Transferred to chair/commode  -AME 4 --> Transferred to chair/commode  -    Row Name 05/18/23 1111 05/18/23 1110       Functional Assessment    Outcome Measure Options AM-PAC 6 Clicks Daily Activity (OT)  - AM-PAC 6 Clicks Basic Mobility (PT)  -          User Key  (r) = Recorded By, (t) = Taken By, (c) = Cosigned By    Initials Name Provider Type     Fredi France, OT Occupational Therapist    Aure Shah, PT Physical Therapist    Janae Severino LPN Licensed Nurse                             Physical Therapy Education     Title: PT OT SLP Therapies (In Progress)     Topic: Physical Therapy (In Progress)     Point: Mobility training (In Progress)     Learning Progress Summary           Patient Acceptance, E, NR by  at 5/18/2023 1407                   Point: Home exercise program (Not Started)     Learner Progress:  Not documented in this visit.          Point: Body mechanics (Not Started)     Learner Progress:  Not documented in this visit.          Point: Precautions (In Progress)     Learning Progress Summary           Patient Acceptance, E, NR by  at 5/18/2023 1407                               User Key     Initials Effective Dates Name Provider Type Discipline     06/16/21 -  Aure Villaseñor, PT Physical Therapist PT              PT Recommendation and Plan  Planned Therapy Interventions (PT): balance training, bed mobility training, gait training, home exercise program, patient/family education, stair training, strengthening, transfer training, wheelchair management/propulsion training  Plan of Care Reviewed With: patient  Outcome Evaluation: PT evaluation completed as co-eval  with OT. Pt in fowlers upon arrival complains of 10/10 pain in legs and later nausea(RN notified and gave meds for both). Patient agreed to bed and EOB eval. Reported she could not stand this visit due to pain and did not have prothesis  for LLE at the hospital. Patient transferred supine to sit and sit to supine with moderate assistance. Patient sat EOB 15 minutes with SBA for sitting balance. Function limited by decreased strength, balance,and tolerance for functional mobility and activities. Pt will benefit from PT to regain lost function as pt improves medically. Anticipate home with assist at discharge with HHPT.     Time Calculation:    PT Charges     Row Name 05/18/23 1409             Time Calculation    Start Time 1110  -AME      Stop Time 1205  -AME      Time Calculation (min) 55 min  -AME      PT Received On 05/18/23  -      PT Goal Re-Cert Due Date 05/31/23  -         Time Calculation- PT    Total Timed Code Minutes- PT 0 minute(s)  -AME         Untimed Charges    PT Eval/Re-eval Minutes 55  -AME         Total Minutes    Untimed Charges Total Minutes 55  -AME       Total Minutes 55  -AME            User Key  (r) = Recorded By, (t) = Taken By, (c) = Cosigned By    Initials Name Provider Type    Aure Shah, PT Physical Therapist              Therapy Charges for Today     Code Description Service Date Service Provider Modifiers Qty    [id number]  PT EVAL MOD COMPLEXITY 4 5/18/2023 Aure Villaseñor, PT GP 1          PT G-Codes  Outcome Measure Options: AM-PAC 6 Clicks Daily Activity (OT)  AM-PAC 6 Clicks Score (PT): 11  AM-PAC 6 Clicks Score (OT): 14  PT Discharge Summary  Anticipated Discharge Disposition (PT): home with assist, home with home health    Aure Villaseñor, PT  5/18/2023

## 2023-05-19 ENCOUNTER — APPOINTMENT (OUTPATIENT)
Dept: ULTRASOUND IMAGING | Facility: HOSPITAL | Age: 63
DRG: 872 | End: 2023-05-19
Payer: COMMERCIAL

## 2023-05-19 LAB
ALBUMIN SERPL-MCNC: 2.9 G/DL (ref 3.5–5.2)
ALBUMIN/GLOB SERPL: 0.9 G/DL
ALP SERPL-CCNC: 90 U/L (ref 39–117)
ALT SERPL W P-5'-P-CCNC: 13 U/L (ref 1–33)
ANION GAP SERPL CALCULATED.3IONS-SCNC: 12 MMOL/L (ref 5–15)
ANISOCYTOSIS BLD QL: NORMAL
AST SERPL-CCNC: 16 U/L (ref 1–32)
BASOPHILS # BLD AUTO: 0.14 10*3/MM3 (ref 0–0.2)
BASOPHILS NFR BLD AUTO: 0.7 % (ref 0–1.5)
BILIRUB SERPL-MCNC: 0.6 MG/DL (ref 0–1.2)
BUN SERPL-MCNC: 15 MG/DL (ref 8–23)
BUN/CREAT SERPL: 13.4 (ref 7–25)
CALCIUM SPEC-SCNC: 8.5 MG/DL (ref 8.6–10.5)
CHLORIDE SERPL-SCNC: 96 MMOL/L (ref 98–107)
CO2 SERPL-SCNC: 23 MMOL/L (ref 22–29)
CREAT SERPL-MCNC: 1.12 MG/DL (ref 0.57–1)
DEPRECATED RDW RBC AUTO: 43.1 FL (ref 37–54)
EGFRCR SERPLBLD CKD-EPI 2021: 56.1 ML/MIN/1.73
EOSINOPHIL # BLD AUTO: 0.24 10*3/MM3 (ref 0–0.4)
EOSINOPHIL NFR BLD AUTO: 1.2 % (ref 0.3–6.2)
ERYTHROCYTE [DISTWIDTH] IN BLOOD BY AUTOMATED COUNT: 13.5 % (ref 12.3–15.4)
GEN 5 2HR TROPONIN T REFLEX: 26 NG/L
GLOBULIN UR ELPH-MCNC: 3.4 GM/DL
GLUCOSE BLDC GLUCOMTR-MCNC: 146 MG/DL (ref 70–130)
GLUCOSE BLDC GLUCOMTR-MCNC: 163 MG/DL (ref 70–130)
GLUCOSE BLDC GLUCOMTR-MCNC: 198 MG/DL (ref 70–130)
GLUCOSE BLDC GLUCOMTR-MCNC: 224 MG/DL (ref 70–130)
GLUCOSE SERPL-MCNC: 181 MG/DL (ref 65–99)
HCT VFR BLD AUTO: 35.1 % (ref 34–46.6)
HGB BLD-MCNC: 11.5 G/DL (ref 12–15.9)
IMM GRANULOCYTES # BLD AUTO: 0.23 10*3/MM3 (ref 0–0.05)
IMM GRANULOCYTES NFR BLD AUTO: 1.2 % (ref 0–0.5)
LYMPHOCYTES # BLD AUTO: 1.18 10*3/MM3 (ref 0.7–3.1)
LYMPHOCYTES NFR BLD AUTO: 6 % (ref 19.6–45.3)
MCH RBC QN AUTO: 28.5 PG (ref 26.6–33)
MCHC RBC AUTO-ENTMCNC: 32.8 G/DL (ref 31.5–35.7)
MCV RBC AUTO: 86.9 FL (ref 79–97)
MONOCYTES # BLD AUTO: 1.6 10*3/MM3 (ref 0.1–0.9)
MONOCYTES NFR BLD AUTO: 8.2 % (ref 5–12)
NEUTROPHILS NFR BLD AUTO: 16.13 10*3/MM3 (ref 1.7–7)
NEUTROPHILS NFR BLD AUTO: 82.7 % (ref 42.7–76)
NRBC BLD AUTO-RTO: 0 /100 WBC (ref 0–0.2)
PLATELET # BLD AUTO: 207 10*3/MM3 (ref 140–450)
PMV BLD AUTO: 10 FL (ref 6–12)
POTASSIUM SERPL-SCNC: 3.7 MMOL/L (ref 3.5–5.2)
PROT SERPL-MCNC: 6.3 G/DL (ref 6–8.5)
RBC # BLD AUTO: 4.04 10*6/MM3 (ref 3.77–5.28)
SMALL PLATELETS BLD QL SMEAR: ADEQUATE
SODIUM SERPL-SCNC: 131 MMOL/L (ref 136–145)
TROPONIN T DELTA: 1 NG/L
TROPONIN T SERPL HS-MCNC: 25 NG/L
VANCOMYCIN PEAK SERPL-MCNC: 39.4 MCG/ML (ref 20–40)
VANCOMYCIN TROUGH SERPL-MCNC: 20.5 MCG/ML (ref 5–20)
WBC MORPH BLD: NORMAL
WBC NRBC COR # BLD: 19.52 10*3/MM3 (ref 3.4–10.8)

## 2023-05-19 PROCEDURE — 80202 ASSAY OF VANCOMYCIN: CPT | Performed by: HOSPITALIST

## 2023-05-19 PROCEDURE — 85007 BL SMEAR W/DIFF WBC COUNT: CPT | Performed by: FAMILY MEDICINE

## 2023-05-19 PROCEDURE — 93005 ELECTROCARDIOGRAM TRACING: CPT

## 2023-05-19 PROCEDURE — 63710000001 INSULIN ASPART PER 5 UNITS: Performed by: HOSPITALIST

## 2023-05-19 PROCEDURE — 82948 REAGENT STRIP/BLOOD GLUCOSE: CPT

## 2023-05-19 PROCEDURE — 97535 SELF CARE MNGMENT TRAINING: CPT

## 2023-05-19 PROCEDURE — 85025 COMPLETE CBC W/AUTO DIFF WBC: CPT | Performed by: FAMILY MEDICINE

## 2023-05-19 PROCEDURE — 80053 COMPREHEN METABOLIC PANEL: CPT | Performed by: HOSPITALIST

## 2023-05-19 PROCEDURE — 93971 EXTREMITY STUDY: CPT

## 2023-05-19 PROCEDURE — 94799 UNLISTED PULMONARY SVC/PX: CPT

## 2023-05-19 PROCEDURE — 93010 ELECTROCARDIOGRAM REPORT: CPT | Performed by: INTERNAL MEDICINE

## 2023-05-19 PROCEDURE — 25010000002 PIPERACILLIN SOD-TAZOBACTAM PER 1 G: Performed by: HOSPITALIST

## 2023-05-19 PROCEDURE — 25010000002 VANCOMYCIN 10 G RECONSTITUTED SOLUTION: Performed by: HOSPITALIST

## 2023-05-19 PROCEDURE — 25010000002 HEPARIN (PORCINE) PER 1000 UNITS: Performed by: HOSPITALIST

## 2023-05-19 PROCEDURE — 97530 THERAPEUTIC ACTIVITIES: CPT

## 2023-05-19 PROCEDURE — 84484 ASSAY OF TROPONIN QUANT: CPT

## 2023-05-19 PROCEDURE — 63710000001 INSULIN DETEMIR PER 5 UNITS: Performed by: HOSPITALIST

## 2023-05-19 RX ORDER — GABAPENTIN 100 MG/1
200 CAPSULE ORAL EVERY 12 HOURS
Status: DISCONTINUED | OUTPATIENT
Start: 2023-05-19 | End: 2023-06-08 | Stop reason: HOSPADM

## 2023-05-19 RX ORDER — CELECOXIB 100 MG/1
100 CAPSULE ORAL EVERY 12 HOURS SCHEDULED
Status: COMPLETED | OUTPATIENT
Start: 2023-05-19 | End: 2023-05-20

## 2023-05-19 RX ORDER — TAMSULOSIN HYDROCHLORIDE 0.4 MG/1
0.4 CAPSULE ORAL DAILY
Status: DISCONTINUED | OUTPATIENT
Start: 2023-05-19 | End: 2023-06-08 | Stop reason: HOSPADM

## 2023-05-19 RX ADMIN — GABAPENTIN 200 MG: 100 CAPSULE ORAL at 20:34

## 2023-05-19 RX ADMIN — SUCRALFATE 1 G: 1 TABLET ORAL at 11:44

## 2023-05-19 RX ADMIN — CLOPIDOGREL BISULFATE 75 MG: 75 TABLET ORAL at 09:15

## 2023-05-19 RX ADMIN — HEPARIN SODIUM 5000 UNITS: 5000 INJECTION INTRAVENOUS; SUBCUTANEOUS at 14:21

## 2023-05-19 RX ADMIN — HYDROCHLOROTHIAZIDE 12.5 MG: 12.5 TABLET ORAL at 09:15

## 2023-05-19 RX ADMIN — CELECOXIB 100 MG: 100 CAPSULE ORAL at 20:35

## 2023-05-19 RX ADMIN — SUCRALFATE 1 G: 1 TABLET ORAL at 09:15

## 2023-05-19 RX ADMIN — SUCRALFATE 1 G: 1 TABLET ORAL at 17:00

## 2023-05-19 RX ADMIN — HEPARIN SODIUM 5000 UNITS: 5000 INJECTION INTRAVENOUS; SUBCUTANEOUS at 20:34

## 2023-05-19 RX ADMIN — INSULIN DETEMIR 40 UNITS: 100 INJECTION, SOLUTION SUBCUTANEOUS at 20:35

## 2023-05-19 RX ADMIN — Medication 10 ML: at 09:20

## 2023-05-19 RX ADMIN — TAMSULOSIN HYDROCHLORIDE 0.4 MG: 0.4 CAPSULE ORAL at 17:00

## 2023-05-19 RX ADMIN — PIPERACILLIN SODIUM AND TAZOBACTAM SODIUM 3.38 G: 3; .375 INJECTION, POWDER, LYOPHILIZED, FOR SOLUTION INTRAVENOUS at 17:33

## 2023-05-19 RX ADMIN — ISOSORBIDE MONONITRATE 30 MG: 30 TABLET, EXTENDED RELEASE ORAL at 09:15

## 2023-05-19 RX ADMIN — RANOLAZINE 500 MG: 500 TABLET, FILM COATED, EXTENDED RELEASE ORAL at 09:15

## 2023-05-19 RX ADMIN — RANOLAZINE 500 MG: 500 TABLET, FILM COATED, EXTENDED RELEASE ORAL at 20:35

## 2023-05-19 RX ADMIN — FUROSEMIDE 40 MG: 40 TABLET ORAL at 09:15

## 2023-05-19 RX ADMIN — PIPERACILLIN SODIUM AND TAZOBACTAM SODIUM 3.38 G: 3; .375 INJECTION, POWDER, LYOPHILIZED, FOR SOLUTION INTRAVENOUS at 10:09

## 2023-05-19 RX ADMIN — ATORVASTATIN CALCIUM 80 MG: 40 TABLET, FILM COATED ORAL at 09:14

## 2023-05-19 RX ADMIN — VENLAFAXINE HYDROCHLORIDE 75 MG: 75 CAPSULE, EXTENDED RELEASE ORAL at 09:15

## 2023-05-19 RX ADMIN — PIPERACILLIN SODIUM AND TAZOBACTAM SODIUM 3.38 G: 3; .375 INJECTION, POWDER, LYOPHILIZED, FOR SOLUTION INTRAVENOUS at 02:09

## 2023-05-19 RX ADMIN — SUCRALFATE 1 G: 1 TABLET ORAL at 20:35

## 2023-05-19 RX ADMIN — CELECOXIB 100 MG: 100 CAPSULE ORAL at 17:00

## 2023-05-19 RX ADMIN — PANTOPRAZOLE SODIUM 40 MG: 40 TABLET, DELAYED RELEASE ORAL at 09:14

## 2023-05-19 RX ADMIN — LORAZEPAM 0.5 MG: 0.5 TABLET ORAL at 14:21

## 2023-05-19 RX ADMIN — LORAZEPAM 0.5 MG: 0.5 TABLET ORAL at 05:10

## 2023-05-19 RX ADMIN — GABAPENTIN 100 MG: 100 CAPSULE ORAL at 09:14

## 2023-05-19 RX ADMIN — FOLIC ACID 1000 MCG: 1 TABLET ORAL at 09:14

## 2023-05-19 RX ADMIN — INSULIN ASPART 3 UNITS: 100 INJECTION, SOLUTION INTRAVENOUS; SUBCUTANEOUS at 10:08

## 2023-05-19 RX ADMIN — INSULIN ASPART 3 UNITS: 100 INJECTION, SOLUTION INTRAVENOUS; SUBCUTANEOUS at 17:01

## 2023-05-19 RX ADMIN — HEPARIN SODIUM 5000 UNITS: 5000 INJECTION INTRAVENOUS; SUBCUTANEOUS at 06:17

## 2023-05-19 RX ADMIN — VANCOMYCIN HYDROCHLORIDE 1000 MG: 10 INJECTION, POWDER, LYOPHILIZED, FOR SOLUTION INTRAVENOUS at 17:00

## 2023-05-19 RX ADMIN — HYDROCODONE BITARTRATE AND ACETAMINOPHEN 1 TABLET: 7.5; 325 TABLET ORAL at 14:21

## 2023-05-19 RX ADMIN — ARIPIPRAZOLE 5 MG: 5 TABLET ORAL at 09:19

## 2023-05-19 RX ADMIN — AMLODIPINE BESYLATE 5 MG: 5 TABLET ORAL at 09:14

## 2023-05-19 RX ADMIN — ASPIRIN 325 MG: 325 TABLET, COATED ORAL at 09:15

## 2023-05-19 RX ADMIN — LEVOTHYROXINE SODIUM 50 MCG: 50 TABLET ORAL at 06:17

## 2023-05-19 NOTE — PROGRESS NOTES
"Pharmacokinetics by Pharmacy - Vancomycin    Ariana Martinez is a 63 y.o. female receiving vancomycin day 3 for skin and soft tissue infection  Patient is also receiving piperacillin-tazobactam    Objective:  [Ht: 172.7 cm (68\"); Wt: 131 kg (289 lb 1.6 oz)]     WBC   Date Value Ref Range Status   05/19/2023 19.52 (H) 3.40 - 10.80 10*3/mm3 Final   05/18/2023 16.99 (H) 3.40 - 10.80 10*3/mm3 Final   05/17/2023 19.65 (H) 3.40 - 10.80 10*3/mm3 Final      Lactate   Date Value Ref Range Status   05/18/2023 1.2 0.5 - 2.0 mmol/L Final   05/17/2023 1.7 0.5 - 2.0 mmol/L Final      Temp Readings from Last 1 Encounters:   05/19/23 98.2 °F (36.8 °C) (Temporal)     Estimated Creatinine Clearance: 75.5 mL/min (A) (by C-G formula based on SCr of 1.12 mg/dL (H)).   Creatinine   Date Value Ref Range Status   05/19/2023 1.12 (H) 0.57 - 1.00 mg/dL Final   05/18/2023 1.10 (H) 0.57 - 1.00 mg/dL Final   05/17/2023 1.26 (H) 0.57 - 1.00 mg/dL Final       Vancomycin Peak   Date Value Ref Range Status   05/19/2023 39.40 20.00 - 40.00 mcg/mL Final     Vancomycin Trough   Date Value Ref Range Status   05/19/2023 20.50 (H) 5.00 - 20.00 mcg/mL Final       Culture Results:  Microbiology Results (last 10 days)     Procedure Component Value - Date/Time    Blood Culture - Blood, Hand, Left [[id number]]  (Normal) Collected: 05/17/23 1151    Lab Status: Preliminary result Specimen: Blood from Hand, Left Updated: 05/18/23 1216     Blood Culture No growth at 24 hours    Blood Culture - Blood, Arm, Right [[id number]]  (Normal) Collected: 05/17/23 0954    Lab Status: Preliminary result Specimen: Blood from Arm, Right Updated: 05/19/23 1016     Blood Culture No growth at 2 days    COVID-19 and FLU A/B PCR - Swab, Nasopharynx [[id number]]  (Normal) Collected: 05/17/23 0830    Lab Status: Final result Specimen: Swab from Nasopharynx Updated: 05/17/23 0908     COVID19 Not Detected     Influenza A PCR Not Detected     Influenza B PCR Not Detected    Narrative:   "    Fact sheet for providers: https://www.fda.gov/media/806609/download    Fact sheet for patients: https://www.fda.gov/media/128867/download    Test performed by PCR.        No results found for: RESPCX    Assessment:    WBC 19.52, elevated   Scr 1.12, slightly elevated but stable  Patient afebrile    5/17 blood culture x2: no growth at 48 hours     Vancomycin peak resulted at 39.4 (goal 30-40) and was drawn 30 minutes after the end of infusion. Vancomycin trough resulted at 20.4 (goal 10-20) and was drawn appropriately. This gives an AUC of 720 (goal 400-600). Will change dose to vancomycin 1000 mg IV Q12H. This gives an estimated peak and trough of 33.6 and 16.2, respectively. The predicted AUC is 551.     Plan:  1. Change to vancomycin 1000mg IV Q12H. Consult ends 5/24/23.  2. Vancomycin peak on 5/21 at 0600 and vancomycin trough on 5/21 at 1530  3. Pharmacy will monitor renal function and adjust dose accordingly.      Julián Carreon RPH   05/19/23 10:21 CDT

## 2023-05-19 NOTE — THERAPY TREATMENT NOTE
Patient Name: Ariana Martinez  : 1960    MRN: [id number]                              Today's Date: 2023       Admit Date: 2023    Visit Dx:     ICD-10-CM ICD-9-CM   1. Sepsis without acute organ dysfunction, due to unspecified organism  A41.9 038.9     995.91   2. Cellulitis of right lower extremity  L03.115 682.6   3. Type 2 diabetes mellitus with hyperglycemia, unspecified whether long term insulin use  E11.65 250.00   4. Impaired mobility and activities of daily living  Z74.09 V49.89    Z78.9    5. Impaired physical mobility  Z74.09 781.99   6. Impaired mobility and ADLs  Z74.09 V49.89    Z78.9      Patient Active Problem List   Diagnosis   • 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, with long-term current use of insulin   • Closed nondisplaced fracture of fifth left metatarsal bone   • MAURICIO (generalized anxiety disorder)   • Depression, major, recurrent, moderate (HCC)   • GERD without esophagitis   • Long term prescription opiate use   • Mixed hyperlipidemia   • Vitamin D deficiency   • Seasonal allergic rhinitis   • Restrictive lung disease secondary to obesity   • Adult body mass index 37.0-37.9   • Snoring   • Class 3 severe obesity due to excess calories without serious comorbidity with body mass index (BMI) of 40.0 to 44.9 in adult (HCC)   • (HFpEF) heart failure with preserved ejection fraction   • Type 2 diabetes mellitus with hyperglycemia, with long-term current use of insulin (HCC)   • Cyanocobalamin deficiency   • Coronary artery disease of native artery of native heart with stable angina pectoris   • Hypertension   • Meniere's disease   • Gastroparesis   • Pulmonary hypertension (HCC)   • Pes cavus   • Primary osteoarthritis involving multiple joints   • Generalized anxiety disorder   • Chronic right-sided low back pain with right-sided sciatica   • Chest pain   • Adverse effect of iron   • Chest pain due to myocardial ischemia   • Nonrheumatic tricuspid  valve regurgitation   • Iron deficiency anemia due to chronic blood loss   • Malaise and fatigue   • Ankle arthritis   • Urinary retention   • Endocarditis   • Essential hypertension   • Occlusion and stenosis of bilateral carotid arteries   • S/P BKA (below knee amputation) unilateral, left (HCC)   • Phantom pain after amputation of lower extremity   • S/P CABG (coronary artery bypass graft)   • Severe malnutrition   • TIA (transient ischemic attack)   • Coronary artery abnormality   • Elevated d-dimer   • Neuropathy   • Chest pain, unspecified type   • Acute pain of right shoulder   • Rotator cuff syndrome, right   • Acquired hypothyroidism   • Bilateral leg edema   • Left elbow pain   • Gastritis   • Olecranon bursitis, left elbow   • Sepsis without acute organ dysfunction, due to unspecified organism     Past Medical History:   Diagnosis Date   • Acute bacterial endocarditis 3/13/2021   • Angina, class IV    • Anxiety    • Anxiety and depression    • Arthritis    • Benign paroxysmal positional vertigo    • Bladder disorder     has bladder stimulator   • Carpal tunnel syndrome    • CHF (congestive heart failure)    • Chronic pain    • Coronary atherosclerosis     hx CABG 2005.  is followed by Dr Kwon   • Depression    • Diabetes mellitus     Type 2, controlled   • Diabetic polyneuropathy    • Disease of thyroid gland    • Elevated cholesterol    • Female stress incontinence    • Foot pain, left    • Full dentures    • Gastroparesis    • GERD (gastroesophageal reflux disease)    • Hyperlipidemia    • Hypertension    • Low back pain    • Malaise and fatigue    • Multiple joint pain    • Obesity     Refuses to be weighed   • Occlusion and stenosis of bilateral carotid arteries 6/18/2021   • Otalgia     Both   • Perforation of tympanic membrane     Left   • Postoperative wound infection    • Vitamin D deficiency    • Wears glasses     reading     Past Surgical History:   Procedure Laterality Date   • ABDOMINAL  SURGERY     • AMPUTATION FOOT     • ANGIOPLASTY      coronary   • ANKLE ARTHROSCOPY Left 02/26/2021    Procedure: Left foot hardware removal, ankle arthroscopy, bone grafting , left foot exostectomy;  Surgeon: Ignacio Lord DPM;  Location: [facility] OR;  Service: Podiatry;  Laterality: Left;   • BREAST BIOPSY Right    • CARDIAC CATHETERIZATION     • CARDIAC CATHETERIZATION N/A 06/20/2017    Procedure: Right Heart Cath;  Surgeon: Can Kwon MD PhD;  Location: [facility] CATH INVASIVE LOCATION;  Service:    • CARDIAC CATHETERIZATION N/A 02/18/2020    Procedure: Left Heart Cath;  Surgeon: Catalina Cooper MD;  Location: [facility] CATH INVASIVE LOCATION;  Service: Cardiology;  Laterality: N/A;   • CARDIAC CATHETERIZATION N/A 04/06/2022    Procedure: Left Heart Cath;  Surgeon: Sheryl Navas MD;  Location: [facility] CATH INVASIVE LOCATION;  Service: Cardiology;  Laterality: N/A;   • CARPAL TUNNEL RELEASE     • CHOLECYSTECTOMY     • COLONOSCOPY N/A 06/24/2020    Procedure: COLONOSCOPY;  Surgeon: Julián Maldonado MD;  Location: [facility] ENDOSCOPY;  Service: Gastroenterology;  Laterality: N/A;   • CORONARY ARTERY BYPASS GRAFT  2005   • ENDOSCOPY N/A 10/19/2018    Procedure: ESOPHAGOGASTRODUODENOSCOPY possible dilation;  Surgeon: Julián Maldonado MD;  Location: [facility] ENDOSCOPY;  Service: Gastroenterology   • ENDOSCOPY N/A 06/24/2020    Procedure: ESOPHAGOGASTRODUODENOSCOPY WED appt please;  Surgeon: Julián Maldonado MD;  Location: [facility] ENDOSCOPY;  Service: Gastroenterology;  Laterality: N/A;   • ENDOSCOPY N/A 06/10/2022    Procedure: ESOPHAGOGASTRODUODENOSCOPY   room 380;  Surgeon: Jeremiah Wilkins MD;  Location: [facility] ENDOSCOPY;  Service: Gastroenterology;  Laterality: N/A;   • ENDOSCOPY N/A 1/10/2023    Procedure: ESOPHAGOGASTRODUODENOSCOPY;  Surgeon: Jeremiah Wilkins MD;  Location: [facility] ENDOSCOPY;  Service: Gastroenterology;  Laterality: N/A;   • ENDOSCOPY AND COLONOSCOPY     • FOOT SURGERY      Toes   •  FOOT SURGERY     • GASTRIC BANDING      Revision, laparoscopic   • HYSTERECTOMY     • INCISION AND DRAINAGE LEG Left 03/12/2021    Procedure: Left ankle arthroscopic irrigation and debridement, screw removal;  Surgeon: Ignacio Lord DPM;  Location: [facility];  Service: Podiatry;  Laterality: Left;   • MOUTH SURGERY     • SALPINGO OOPHORECTOMY     • SHOULDER SURGERY     • SUBTALAR ARTHRODESIS Left 01/16/2019    Procedure: LEFT FOOT HARDWARE REMOVAL, FIFTH METATARSAL , OPEN REDUCTION INTERNAL FIXATION, CALCANEAL OSTEOTOMY;  Surgeon: Ignacio Lord DPM;  Location: [facility];  Service: Podiatry   • SUBTALAR ARTHRODESIS Left 10/16/2019    Procedure: foot hardware removal, subtalar joint fusion  possible de/reattachment of achilles tendon        (c-arm);  Surgeon: Ignacio Lord DPM;  Location: [facility];  Service: Podiatry   • SUBTALAR ARTHRODESIS Left 09/30/2020    Procedure: subtalar, talonavicular joint arthrodesis.  Removal hardware.          (c-arm);  Surgeon: Ignacio Lord DPM;  Location: [facility];  Service: Podiatry;  Laterality: Left;   • TRANSESOPHAGEAL ECHOCARDIOGRAM (LAMNOTE)      With color flow      General Information     Row Name 05/19/23 0934          OT Time and Intention    Document Type therapy note (daily note)  -RW     Mode of Treatment individual therapy;occupational therapy  -     Row Name 05/19/23 0934          General Information    Patient Profile Reviewed yes  -RW     Existing Precautions/Restrictions fall;oxygen therapy device and L/min  -     Row Name 05/19/23 0934          Cognition    Orientation Status (Cognition) oriented x 4  -RW           User Key  (r) = Recorded By, (t) = Taken By, (c) = Cosigned By    Initials Name Provider Type    RW Missy Esteves OT Occupational Therapist                 Mobility/ADL's     Row Name 05/19/23 0934          Bed Mobility    Bed Mobility supine-sit;sit-supine  -RW     Supine-Sit [place] (Bed Mobility) standby assist  -RW      Sit-Supine [place] (Bed Mobility) standby assist  -RW     Assistive Device (Bed Mobility) head of bed elevated;bed rails  -     Row Name 05/19/23 0934          Activities of Daily Living    BADL Assessment/Intervention grooming  -     Row Name 05/19/23 0934          Grooming Assessment/Training    [place] Level (Grooming) hair care, combing/brushing;oral care regimen;wash face, hands;set up;supervision;shave face;dependent (less than 25% patient effort)  -RW     Position (Grooming) edge of bed sitting;supine  -RW           User Key  (r) = Recorded By, (t) = Taken By, (c) = Cosigned By    Initials Name Provider Type    RW Missy Esteves OT Occupational Therapist               Obj/Interventions    No documentation.                Goals/Plan     Row Name 05/19/23 0934          Transfer Goal 1 (OT)    Activity/Assistive Device (Transfer Goal 1, OT) sit-to-stand/stand-to-sit;toilet;wheelchair transfer;bed-to-chair/chair-to-bed  -RW     [place] Level/Cues Needed (Transfer Goal 1, OT) contact guard required  -RW     Time Frame (Transfer Goal 1, OT) long term goal (LTG)  -     Row Name 05/19/23 0934          Bathing Goal 1 (OT)    Activity/Device (Bathing Goal 1, OT) bathing skills, all  -RW     [place] Level/Cues Needed (Bathing Goal 1, OT) contact guard required  -RW     Time Frame (Bathing Goal 1, OT) long term goal (LTG)  -RW     Progress/Outcomes (Bathing Goal 1, OT) goal not met  -     Row Name 05/19/23 0934          Dressing Goal 1 (OT)    Activity/Device (Dressing Goal 1, OT) dressing skills, all  -RW     [place]/Cues Needed (Dressing Goal 1, OT) contact guard required  -RW     Time Frame (Dressing Goal 1, OT) long term goal (LTG)  -RW     Progress/Outcome (Dressing Goal 1, OT) goal not met  -     Row Name 05/19/23 0934          Toileting Goal 1 (OT)    Activity/Device (Toileting Goal 1, OT) toileting skills, all  -RW     [place] Level/Cues Needed (Toileting Goal 1, OT)  minimum assist (75% or more patient effort)  -RW     Time Frame (Toileting Goal 1, OT) long term goal (LTG)  -RW     Progress/Outcome (Toileting Goal 1, OT) goal not met  -RW     Row Name 05/19/23 0934          Self-Feeding Goal 1 (OT)    Activity/Device (Self-Feeding Goal 1, OT) self-feeding skills, all  -RW     [place] Level/Cues Needed (Self-Feeding Goal 1, OT) modified independence  -RW     Time Frame (Self-Feeding Goal 1, OT) long term goal (LTG)  -RW     Progress/Outcomes (Self-Feeding Goal 1, OT) goal not met  -RW           User Key  (r) = Recorded By, (t) = Taken By, (c) = Cosigned By    Initials Name Provider Type    RW Missy Esteves, OT Occupational Therapist               Clinical Impression     Row Name 05/19/23 0934          Pain Assessment    Pretreatment Pain Rating 7/10  -RW     Posttreatment Pain Rating 7/10  -RW     Pain Location - Side/Orientation Bilateral  -RW     Pain Location lower  -RW     Pain Location - extremity  -RW     Row Name 05/19/23 0934          Plan of Care Review    Plan of Care Reviewed With patient  -RW     Outcome Evaluation OT tx complete. Pt supine in bed upon arrival and agreeable to therapy. Pt perf sup<>sit with SBA. Pt sat EOB ~7 minutes to perform grooming ax with set up assist. Pt dependent to shave face while supine in bed. No goals met. Cont OT POC. All needs within reach.  -     Row Name 05/19/23 0934          Vital Signs    Pre Systolic BP Rehab 145  -RW     Pre Treatment Diastolic BP 57  -RW     Pretreatment Heart Rate (beats/min) 80  -RW     Pre Patient Position Supine  -RW     Post Patient Position Supine  -RW     Row Name 05/19/23 0934          Positioning and Restraints    Pre-Treatment Position in bed  -RW     Post Treatment Position bed  -RW     In Bed supine;call light within reach;encouraged to call for assist;exit alarm on;with nsg;notified nsg  -RW           User Key  (r) = Recorded By, (t) = Taken By, (c) = Cosigned By    Initials Name Provider  Type    Missy Burger OT Occupational Therapist               Outcome Measures     Row Name 05/19/23 0934          How much help from another is currently needed...    Putting on and taking off regular lower body clothing? 2  -RW     Bathing (including washing, rinsing, and drying) 2  -RW     Toileting (which includes using toilet bed pan or urinal) 2  -RW     Putting on and taking off regular upper body clothing 3  -RW     Taking care of personal grooming (such as brushing teeth) 3  -RW     Eating meals 3  -RW     AM-PAC 6 Clicks Score (OT) 15  -RW     Row Name 05/19/23 0934          Functional Assessment    Outcome Measure Options AM-PAC 6 Clicks Daily Activity (OT)  -RW           User Key  (r) = Recorded By, (t) = Taken By, (c) = Cosigned By    Initials Name Provider Type    Missy Burger OT Occupational Therapist                Occupational Therapy Education     Title: PT OT SLP Therapies (In Progress)     Topic: Occupational Therapy (In Progress)     Point: ADL training (Not Started)     Description:   Instruct learner(s) on proper safety adaptation and remediation techniques during self care or transfers.   Instruct in proper use of assistive devices.              Learner Progress:  Not documented in this visit.          Point: Home exercise program (Not Started)     Description:   Instruct learner(s) on appropriate technique for monitoring, assisting and/or progressing therapeutic exercises/activities.              Learner Progress:  Not documented in this visit.          Point: Precautions (Done)     Description:   Instruct learner(s) on prescribed precautions during self-care and functional transfers.              Learning Progress Summary           Patient Acceptance, E, DU by RB at 5/18/2023 9220    Comment: Pt edu on use of gait belt and non skid socks when OOB and no OOB without assist.                   Point: Body mechanics (Not Started)     Description:   Instruct learner(s) on proper  positioning and spine alignment during self-care, functional mobility activities and/or exercises.              Learner Progress:  Not documented in this visit.                      User Key     Initials Effective Dates Name Provider Type Discipline     06/16/21 -  Fredi France OT Occupational Therapist OT              OT Recommendation and Plan     Plan of Care Review  Plan of Care Reviewed With: patient  Outcome Evaluation: OT tx complete. Pt supine in bed upon arrival and agreeable to therapy. Pt perf sup<>sit with SBA. Pt sat EOB ~7 minutes to perform grooming ax with set up assist. Pt dependent to shave face while supine in bed. No goals met. Cont OT POC. All needs within reach.     Time Calculation:    Time Calculation- OT     Row Name 05/19/23 1442             Time Calculation- OT    OT Start Time 0934  -RW      OT Stop Time 1014  -RW      OT Time Calculation (min) 40 min  -RW      Total Timed Code Minutes- OT 40 minute(s)  -RW      OT Received On 05/19/23  -RW         Timed Charges    45397 - OT Self Care/Mgmt Minutes 40  -RW         Total Minutes    Timed Charges Total Minutes 40  -RW       Total Minutes 40  -RW            User Key  (r) = Recorded By, (t) = Taken By, (c) = Cosigned By    Initials Name Provider Type    RW Missy Esteves OT Occupational Therapist              Therapy Charges for Today     Code Description Service Date Service Provider Modifiers Qty    [id number] HC OT SELF CARE/MGMT/TRAIN EA 15 MIN 5/19/2023 Missy Esteves OT GO 3               Missy Esteves OT  5/19/2023

## 2023-05-19 NOTE — PLAN OF CARE
Goal Outcome Evaluation:  Plan of Care Reviewed With: patient        Progress: improving          Pt resting in bed at this time, pain controlled with medication and rest, no falls noted, no new skin issues, midline dressing changed IV atbs continued

## 2023-05-19 NOTE — PROGRESS NOTES
[facility] HOSPITALIST PROGRESS NOTE     Patient Identification:  Name:  Ariana Martinez  Age:  63 y.o.  Sex:  female  :  1960  MRN:  [id number]  Visit Number:  [id number]  Primary Care Provider:  Dolly Foss APRN    Length of stay:  2        Subjective:    Seen and evaluated with the nursing staff.  She continues to complain of severe 10/10 pain in her right lower extremity.  Was having urinary retention and Jose catheter placed.  No previous history of urinary retention  ----------------------------------------------------------------------------------------------------------------------  Current Hospital Meds:  !Vancomycin Level Draw Needed, , Does not apply, Once  [START ON 2023] !Vancomycin Level Draw Needed, , Does not apply, Once  amLODIPine, 5 mg, Oral, Daily  ARIPiprazole, 5 mg, Oral, Daily  aspirin EC, 325 mg, Oral, Daily  atorvastatin, 80 mg, Oral, Daily  celecoxib, 100 mg, Oral, Q12H  clopidogrel, 75 mg, Oral, Daily  folic acid, 1,000 mcg, Oral, Daily  furosemide, 40 mg, Oral, Daily  gabapentin, 200 mg, Oral, Q12H  heparin (porcine), 5,000 Units, Subcutaneous, Q8H  hydroCHLOROthiazide, 12.5 mg, Oral, Daily  Insulin Aspart, 0-14 Units, Subcutaneous, TID AC  insulin detemir, 40 Units, Subcutaneous, Nightly  isosorbide mononitrate, 30 mg, Oral, Daily  levothyroxine, 50 mcg, Oral, QAM  pantoprazole, 40 mg, Oral, Daily  piperacillin-tazobactam, 3.375 g, Intravenous, Q8H  ranolazine, 500 mg, Oral, Q12H  sodium chloride, 10 mL, Intravenous, Q12H  sucralfate, 1 g, Oral, 4x Daily  tamsulosin, 0.4 mg, Oral, Daily  vancomycin, 1,000 mg, Intravenous, Q12H  venlafaxine XR, 75 mg, Oral, Daily With Breakfast      Pharmacy to dose vancomycin,   sodium chloride, 100 mL/hr, Last Rate: 100 mL/hr (23)      ----------------------------------------------------------------------------------------------------------------------  Vital Signs:  Temp:  [96.8 °F (36  °C)-98.2 °F (36.8 °C)] 96.8 °F (36 °C)  Heart Rate:  [76-85] 76  Resp:  [18-20] 18  BP: (123-144)/(57-67) 123/57      05/17/23  1340 05/18/23  0500 05/19/23  0411   Weight: 126 kg (277 lb 12.8 oz) 127 kg (280 lb) 131 kg (289 lb 1.6 oz)     Body mass index is 43.96 kg/m².    Intake/Output Summary (Last 24 hours) at 5/19/2023 1412  Last data filed at 5/19/2023 1009  Gross per 24 hour   Intake 560 ml   Output 3250 ml   Net -2690 ml     Diet: Cardiac Diets, Diabetic Diets; Healthy Heart (2-3 Na+); Consistent Carbohydrate; Texture: Regular Texture (IDDSI 7); Fluid Consistency: Thin (IDDSI 0)  ----------------------------------------------------------------------------------------------------------------------  Physical exam:  Constitutional: Obese middle aged woman in moderate distress.  No respiratory distress.      HENT:  Head:  Normocephalic and atraumatic.  Mouth:  Moist mucous membranes.    Eyes:  Conjunctivae and EOM are normal.  Pupils are equal, round, and reactive to light.  No scleral icterus.   Neck:  Neck supple.  No JVD present.    Cardiovascular:  Normal rate, regular rhythm and normal heart sounds with no murmur.  Pulmonary/Chest:  No respiratory distress, no wheezes, no crackles, with normal breath sounds and good air movement.  Abdominal:  Soft and obese.  Bowel sounds are normal.  No distension and no tenderness.   Musculoskeletal: Nonpitting edema bilateral upper extremity right more than left.  Left BKA  Neurological:  Alert and oriented to person, place, and time.  No cranial nerve deficit.  No tongue deviation.  No facial droop.  No slurred speech.   Skin: Right lower extremity erythematous, swollen and very tender.  Open wound on the fourth toe dorsal surface  Peripheral vascular:  Strong pulses in all 4 extremities with no clubbing, no cyanosis, no edema.  Genitourinary: Jose catheter in  place  ----------------------------------------------------------------------------------------------------------------------  Tele:    ----------------------------------------------------------------------------------------------------------------------  Results from last 7 days   Lab Units 05/19/23  0611 05/19/23 0246 05/17/23  0954   HSTROP T ng/L 26* 25* 39*     Results from last 7 days   Lab Units 05/19/23 0246 05/18/23  0556 05/18/23  0000 05/17/23  0954   LACTATE mmol/L  --   --  1.2 1.7   WBC 10*3/mm3 19.52* 16.99*  --  19.65*   HEMOGLOBIN g/dL 11.5* 10.7*  --  11.8*   HEMATOCRIT % 35.1 31.8*  --  34.1   MCV fL 86.9 84.8  --  85.0   MCHC g/dL 32.8 33.6  --  34.6   PLATELETS 10*3/mm3 207 233  --  249     Results from last 7 days   Lab Units 05/17/23  1026   PH, ARTERIAL pH units 7.451*   PO2 ART mm Hg 65.7*   PCO2, ARTERIAL mm Hg 36.0   HCO3 ART mmol/L 25.1     Results from last 7 days   Lab Units 05/19/23  0246 05/18/23  1949 05/18/23  0556 05/17/23  1728 05/17/23  0954   SODIUM mmol/L 131*  --  130*  --  128*   POTASSIUM mmol/L 3.7 4.0 3.2*   < > 3.3*   MAGNESIUM mg/dL  --   --   --   --  1.6   CHLORIDE mmol/L 96*  --  98  --  92*   CO2 mmol/L 23.0  --  23.0  --  21.0*   BUN mg/dL 15  --  15  --  14   CREATININE mg/dL 1.12*  --  1.10*  --  1.26*   CALCIUM mg/dL 8.5*  --  8.3*  --  8.5*   GLUCOSE mg/dL 181*  --  247*  --  307*   ALBUMIN g/dL 2.9*  --  2.7*  --  3.0*   BILIRUBIN mg/dL 0.6  --  0.6  --  0.7   ALK PHOS U/L 90  --  84  --  90   AST (SGOT) U/L 16  --  12  --  13   ALT (SGPT) U/L 13  --  11  --  11    < > = values in this interval not displayed.   Estimated Creatinine Clearance: 75.5 mL/min (A) (by C-G formula based on SCr of 1.12 mg/dL (H)).    No results found for: AMMONIA      Blood Culture   Date Value Ref Range Status   05/17/2023 No growth at 2 days  Preliminary   05/17/2023 No growth at 2 days  Preliminary     No results found for: URINECX  No results found for: WOUNDCX  No results found  for: STOOLCX    I have personally looked at the labs and they are summarized above.  ----------------------------------------------------------------------------------------------------------------------  Imaging Results (Last 24 Hours)     Procedure Component Value Units Date/Time    US Venous Doppler Lower Extremity Right (duplex) [[id number]] Collected: 05/19/23 0847     Updated: 05/19/23 0848    Narrative:        TECHNIQUE:  High-resolution gray scale imaging, color flow Doppler, compression were  performed from the groin to the calf of the right lower extremity.  Augmentation  was performed of the right common femoral vein and popliteal vein.    FINDINGS:  No evidence of DVT.  There is a 3.7 cm nonspecific right inguinal lymph node.  No other significant findings.    SUMMARY:  Right inguinal adenopathy.  No evidence of DVT.        ----------------------------------------------------------------------------------------------------------------------  Assessment and Plan:  Active Hospital Problems     Diagnosis     • **Sepsis without acute organ dysfunction, due to unspecified organism     Right lower extremity cellulitis  -Ultrasound is negative for DVT  -Prop up the right lower extremity  -continue with IV antibiotics including Zosyn and vancomycin,  -For her pain increase gabapentin to 200.  - Celebrex 100 mg twice a day     Hypertension  -continue with amlodipine and continue with blood pressure monitoring     Coronary artery disease  -continue with aspirin, isosorbide, ranolazine atorvastatin     Hyperlipidemia-continue with Lipitor     Type 2 diabetes  -Carbohydrate consistent diet  -continue with sliding scale insulin  - Levemir  - Blood glucose checks     GERD  -continue with Protonix    Morbid obesity.  BMI 43.96.      Status post left BKA.    Urinary retention.  Exact etiology not known.  Continue with Jose catheter care.  P.o. tamsulosin.    Prophylaxis:  DVT-heparin         Conditions and Status:    Patient condition is guarded        Treatment Plan  As above     Care Planning  Shared decision making: Patient  Code status and discussions: Full code     Disposition  Social Determinants of Health that impact treatment or disposition: None  I expect the patient to be discharged to home in 3 to 4 days.        Santy Rodriguez MD  05/19/23  14:12 CDT     Dragon disclaimer:  Part of this note may be an electronic transcription/translation of spoken language to printed text using the Dragon Dictation System.                        Dragon disclaimer:  Part of this note may be an electronic transcription/translation of spoken language to printed text using the Dragon Dictation System.

## 2023-05-19 NOTE — PLAN OF CARE
Goal Outcome Evaluation:  Plan of Care Reviewed With: patient        Progress: improving  Outcome Evaluation: pt in bed and agreeable to eob and does with sba and bed rails . seated eob therex and nurse comes in and wants to transfer pt to another bed. pt not able to stand pivot so beds aliigned and pt scooted from bed onto bed with min assist. leg elebated and needs in reach . pt advised to has  and prosthesis brought in for potential mobility as able.

## 2023-05-19 NOTE — THERAPY TREATMENT NOTE
Acute Care - Physical Therapy Treatment Note  [facility]     Patient Name: Ariana Martinez  : 1960  MRN: [id number]  Today's Date: 2023      Visit Dx:     ICD-10-CM ICD-9-CM   1. Sepsis without acute organ dysfunction, due to unspecified organism  A41.9 038.9     995.91   2. Cellulitis of right lower extremity  L03.115 682.6   3. Type 2 diabetes mellitus with hyperglycemia, unspecified whether long term insulin use  E11.65 250.00   4. Impaired mobility and activities of daily living  Z74.09 V49.89    Z78.9    5. Impaired physical mobility  Z74.09 781.99     Patient Active Problem List   Diagnosis   • 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, with long-term current use of insulin   • Closed nondisplaced fracture of fifth left metatarsal bone   • MAURICIO (generalized anxiety disorder)   • Depression, major, recurrent, moderate (HCC)   • GERD without esophagitis   • Long term prescription opiate use   • Mixed hyperlipidemia   • Vitamin D deficiency   • Seasonal allergic rhinitis   • Restrictive lung disease secondary to obesity   • Adult body mass index 37.0-37.9   • Snoring   • Class 3 severe obesity due to excess calories without serious comorbidity with body mass index (BMI) of 40.0 to 44.9 in adult ([facility])   • (HFpEF) heart failure with preserved ejection fraction   • Type 2 diabetes mellitus with hyperglycemia, with long-term current use of insulin ([facility])   • Cyanocobalamin deficiency   • Coronary artery disease of native artery of native heart with stable angina pectoris   • Hypertension   • Meniere's disease   • Gastroparesis   • Pulmonary hypertension ([facility])   • Pes cavus   • Primary osteoarthritis involving multiple joints   • Generalized anxiety disorder   • Chronic right-sided low back pain with right-sided sciatica   • Chest pain   • Adverse effect of iron   • Chest pain due to myocardial ischemia   • Nonrheumatic tricuspid valve regurgitation   • Iron deficiency anemia  due to chronic blood loss   • Malaise and fatigue   • Ankle arthritis   • Urinary retention   • Endocarditis   • Essential hypertension   • Occlusion and stenosis of bilateral carotid arteries   • S/P BKA (below knee amputation) unilateral, left (HCC)   • Phantom pain after amputation of lower extremity   • S/P CABG (coronary artery bypass graft)   • Severe malnutrition   • TIA (transient ischemic attack)   • Coronary artery abnormality   • Elevated d-dimer   • Neuropathy   • Chest pain, unspecified type   • Acute pain of right shoulder   • Rotator cuff syndrome, right   • Acquired hypothyroidism   • Bilateral leg edema   • Left elbow pain   • Gastritis   • Olecranon bursitis, left elbow   • Sepsis without acute organ dysfunction, due to unspecified organism     Past Medical History:   Diagnosis Date   • Acute bacterial endocarditis 3/13/2021   • Angina, class IV    • Anxiety    • Anxiety and depression    • Arthritis    • Benign paroxysmal positional vertigo    • Bladder disorder     has bladder stimulator   • Carpal tunnel syndrome    • CHF (congestive heart failure)    • Chronic pain    • Coronary atherosclerosis     hx CABG 2005.  is followed by Dr Kwon   • Depression    • Diabetes mellitus     Type 2, controlled   • Diabetic polyneuropathy    • Disease of thyroid gland    • Elevated cholesterol    • Female stress incontinence    • Foot pain, left    • Full dentures    • Gastroparesis    • GERD (gastroesophageal reflux disease)    • Hyperlipidemia    • Hypertension    • Low back pain    • Malaise and fatigue    • Multiple joint pain    • Obesity     Refuses to be weighed   • Occlusion and stenosis of bilateral carotid arteries 6/18/2021   • Otalgia     Both   • Perforation of tympanic membrane     Left   • Postoperative wound infection    • Vitamin D deficiency    • Wears glasses     reading     Past Surgical History:   Procedure Laterality Date   • ABDOMINAL SURGERY     • AMPUTATION FOOT     • ANGIOPLASTY       coronary   • ANKLE ARTHROSCOPY Left 02/26/2021    Procedure: Left foot hardware removal, ankle arthroscopy, bone grafting , left foot exostectomy;  Surgeon: Ignacio Lord DPM;  Location: [facility] OR;  Service: Podiatry;  Laterality: Left;   • BREAST BIOPSY Right    • CARDIAC CATHETERIZATION     • CARDIAC CATHETERIZATION N/A 06/20/2017    Procedure: Right Heart Cath;  Surgeon: Can Kwon MD PhD;  Location: [facility] CATH INVASIVE LOCATION;  Service:    • CARDIAC CATHETERIZATION N/A 02/18/2020    Procedure: Left Heart Cath;  Surgeon: Catalina Cooper MD;  Location: [facility] CATH INVASIVE LOCATION;  Service: Cardiology;  Laterality: N/A;   • CARDIAC CATHETERIZATION N/A 04/06/2022    Procedure: Left Heart Cath;  Surgeon: Sheryl Navas MD;  Location: [facility] CATH INVASIVE LOCATION;  Service: Cardiology;  Laterality: N/A;   • CARPAL TUNNEL RELEASE     • CHOLECYSTECTOMY     • COLONOSCOPY N/A 06/24/2020    Procedure: COLONOSCOPY;  Surgeon: Julián Maldonado MD;  Location: [facility] ENDOSCOPY;  Service: Gastroenterology;  Laterality: N/A;   • CORONARY ARTERY BYPASS GRAFT  2005   • ENDOSCOPY N/A 10/19/2018    Procedure: ESOPHAGOGASTRODUODENOSCOPY possible dilation;  Surgeon: Julián Maldonado MD;  Location: [facility] ENDOSCOPY;  Service: Gastroenterology   • ENDOSCOPY N/A 06/24/2020    Procedure: ESOPHAGOGASTRODUODENOSCOPY WED appt please;  Surgeon: Julián Maldonado MD;  Location: [facility] ENDOSCOPY;  Service: Gastroenterology;  Laterality: N/A;   • ENDOSCOPY N/A 06/10/2022    Procedure: ESOPHAGOGASTRODUODENOSCOPY   room 380;  Surgeon: Jeremiah Wilkins MD;  Location: [facility] ENDOSCOPY;  Service: Gastroenterology;  Laterality: N/A;   • ENDOSCOPY N/A 1/10/2023    Procedure: ESOPHAGOGASTRODUODENOSCOPY;  Surgeon: Jeremiah Wilkins MD;  Location: [facility] ENDOSCOPY;  Service: Gastroenterology;  Laterality: N/A;   • ENDOSCOPY AND COLONOSCOPY     • FOOT SURGERY      Toes   • FOOT SURGERY     • GASTRIC BANDING      Revision,  laparoscopic   • HYSTERECTOMY     • INCISION AND DRAINAGE LEG Left 03/12/2021    Procedure: Left ankle arthroscopic irrigation and debridement, screw removal;  Surgeon: Ignacio Lord DPM;  Location: [facility];  Service: Podiatry;  Laterality: Left;   • MOUTH SURGERY     • SALPINGO OOPHORECTOMY     • SHOULDER SURGERY     • SUBTALAR ARTHRODESIS Left 01/16/2019    Procedure: LEFT FOOT HARDWARE REMOVAL, FIFTH METATARSAL , OPEN REDUCTION INTERNAL FIXATION, CALCANEAL OSTEOTOMY;  Surgeon: Ignacio oLrd DPM;  Location: [facility];  Service: Podiatry   • SUBTALAR ARTHRODESIS Left 10/16/2019    Procedure: foot hardware removal, subtalar joint fusion  possible de/reattachment of achilles tendon        (c-arm);  Surgeon: Ignacio Lord DPM;  Location: [facility];  Service: Podiatry   • SUBTALAR ARTHRODESIS Left 09/30/2020    Procedure: subtalar, talonavicular joint arthrodesis.  Removal hardware.          (c-arm);  Surgeon: Ignacio Lord DPM;  Location: [facility];  Service: Podiatry;  Laterality: Left;   • TRANSESOPHAGEAL ECHOCARDIOGRAM (LAMONTE)      With color flow     PT Assessment (last 12 hours)     PT Evaluation and Treatment     Row Name 05/19/23 1346          Physical Therapy Time and Intention    Document Type therapy note (daily note)  -RW     Mode of Treatment physical therapy  -RW     Patient Effort good  -     Row Name 05/19/23 1344          General Information    Patient Profile Reviewed yes  -RW     Existing Precautions/Restrictions fall  -RW     Row Name 05/19/23 1345          Pain    Pretreatment Pain Rating 8/10  -RW     Pain Location - Side/Orientation Bilateral  -RW     Pain Location lower  -RW     Pain Location - extremity  -RW     Pain Intervention(s) Nursing Notified  -RW     Row Name 05/19/23 1347          Cognition    Orientation Status (Cognition) oriented x 4  -RW     Row Name 05/19/23 1348          Bed Mobility    Bed Mobility supine-sit;sit-supine;scooting/bridging  -RW      Scooting/Bridging [place] (Bed Mobility) standby assist;verbal cues  -RW     Supine-Sit [place] (Bed Mobility) standby assist;contact guard  -RW     Sit-Supine [place] (Bed Mobility) contact guard  -RW     Assistive Device (Bed Mobility) head of bed elevated;bed rails  -     Row Name 05/19/23 1345          Transfers    Comment, (Transfers) transfered bed to bed  -     Row Name 05/19/23 1345          Bed-Chair Transfer    Bed-Chair [place] (Transfers) unable to assess  -     Row Name 05/19/23 1345          Sit-Stand Transfer    Sit-Stand [place] (Transfers) unable to assess  -     Row Name 05/19/23 1345          Gait/Stairs (Locomotion)    [place] Level (Gait) unable to assess  -     Row Name 05/19/23 1345          Safety Issues, Functional Mobility    Impairments Affecting Function (Mobility) endurance/activity tolerance;strength;pain  -     Row Name 05/19/23 1345          Balance    Static Sitting Balance modified independence;standby assist  -RW     Position, Sitting Balance sitting edge of bed  -     Row Name 05/19/23 1345          Motor Skills    Therapeutic Exercise hip;knee;ankle  -     Row Name 05/19/23 1345          Knee (Therapeutic Exercise)    Knee (Therapeutic Exercise) AROM (active range of motion)  -     Knee AROM (Therapeutic Exercise) LAQ (long arc quad);10 repetitions;2 sets  -     Row Name 05/19/23 1345          Ankle (Therapeutic Exercise)    Ankle (Therapeutic Exercise) AROM (active range of motion)  -     Ankle AROM (Therapeutic Exercise) dorsiflexion;plantarflexion;15 repititions  -     Row Name             Wound 05/17/23 1828 Right anterior fifth toe    Wound - Properties Group Placement Date: 05/17/23 - Placement Time: 1828 - Side: Right  - Orientation: anterior  - Location: fifth toe  -JH    Retired Wound - Properties Group Placement Date: 05/17/23  - Placement Time: 1828 - Side: Right  - Orientation: anterior  -  Location: fifth toe  -    Retired Wound - Properties Group Date first assessed: 05/17/23  - Time first assessed: 1828 - Side: Right  - Location: fifth toe  -    Row Name 05/19/23 1345          Vital Signs    Pre Systolic BP Rehab 128  -RW     Pre Treatment Diastolic BP 60  -RW     Post Systolic BP Rehab 139  -RW     Post Treatment Diastolic BP 63  -RW     Pretreatment Heart Rate (beats/min) 78  -RW     Posttreatment Heart Rate (beats/min) 79  -RW     Pre SpO2 (%) 94  -RW     O2 Delivery Pre Treatment nasal cannula  -RW     Post SpO2 (%) 91  -RW     O2 Delivery Post Treatment nasal cannula  -RW     Row Name 05/19/23 1345          Positioning and Restraints    Pre-Treatment Position in bed  -RW     Post Treatment Position bed  -RW     Row Name 05/19/23 1346          Therapy Assessment/Plan (PT)    Rehab Potential (PT) good, to achieve stated therapy goals  -RW     Criteria for Skilled Interventions Met (PT) yes;meets criteria;skilled treatment is necessary  -RW     Therapy Frequency (PT) other (see comments)  5-7 days/wk  -     Row Name 05/19/23 1345          Bed Mobility Goal 1 (PT)    Activity/Assistive Device (Bed Mobility Goal 1, PT) bed mobility activities, all  -RW     [place] Level/Cues Needed (Bed Mobility Goal 1, PT) standby assist;independent  -RW     Time Frame (Bed Mobility Goal 1, PT) by discharge  -RW     Progress/Outcomes (Bed Mobility Goal 1, PT) goal met  -     Row Name 05/19/23 1340          Transfer Goal 1 (PT)    Activity/Assistive Device (Transfer Goal 1, PT) sit-to-stand/stand-to-sit;bed-to-chair/chair-to-bed  -RW     [place] Level/Cues Needed (Transfer Goal 1, PT) contact guard required;standby assist;modified independence  -RW     Time Frame (Transfer Goal 1, PT) by discharge  -RW     Progress/Outcome (Transfer Goal 1, PT) goal not met  -     Row Name 05/19/23 1347          Gait Training Goal 1 (PT)    Activity/Assistive Device (Gait Training Goal 1, PT) gait (walking  locomotion);decrease fall risk  -RW     [place] Level (Gait Training Goal 1, PT) contact guard required;standby assist;modified independence  -RW     Distance (Gait Training Goal 1, PT) 50ft or more each trip  -RW     Time Frame (Gait Training Goal 1, PT) 1 week  -RW     Progress/Outcome (Gait Training Goal 1, PT) goal not met  -RW     Row Name 05/19/23 1345          ROM Goal 1 (PT)    ROM Goal 1 (PT) Pt will tolerate LE exercises OOB in chair with VSS  -RW     Time Frame (ROM Goal 1, PT) by discharge  -RW     Progress/Outcome (ROM Goal 1, PT) goal not met  -RW           User Key  (r) = Recorded By, (t) = Taken By, (c) = Cosigned By    Initials Name Provider Type    RW Sergio Fuentes, PTA Physical Therapist Assistant    Janae Severino LPN Licensed Nurse                Physical Therapy Education     Title: PT OT SLP Therapies (In Progress)     Topic: Physical Therapy (In Progress)     Point: Mobility training (Done)     Learning Progress Summary           Patient Acceptance, E,TB, VU by NB at 5/18/2023 2138    Acceptance, E, NR by  at 5/18/2023 1407                   Point: Home exercise program (Not Started)     Learner Progress:  Not documented in this visit.          Point: Body mechanics (Not Started)     Learner Progress:  Not documented in this visit.          Point: Precautions (In Progress)     Learning Progress Summary           Patient Acceptance, E, NR by AME at 5/18/2023 1407                               User Key     Initials Effective Dates Name Provider Type Discipline    AME 06/16/21 -  Aure Villaseñor, PT Physical Therapist PT    NB 06/16/21 -  Samantha Cuellar, RN Registered Nurse Nurse              PT Recommendation and Plan  Anticipated Discharge Disposition (PT): home with assist, home with home health  Therapy Frequency (PT): other (see comments) (5-7 days/wk)  Plan of Care Reviewed With: patient  Progress: improving  Outcome Evaluation: pt in bed and agreeable to eob and does with sba  and bed rails . seated eob therex and nurse comes in and wants to transfer pt to another bed. pt not able to stand pivot so beds aliigned and pt scooted from bed onto bed with min assist. leg elebated and needs in reach . pt advised to has  and prosthesis brought in for potential mobility as able.   Outcome Measures     Row Name 05/18/23 1111             How much help from another is currently needed...    Putting on and taking off regular lower body clothing? 2  -RB      Bathing (including washing, rinsing, and drying) 2  -RB      Toileting (which includes using toilet bed pan or urinal) 2  -RB      Putting on and taking off regular upper body clothing 2  -RB      Taking care of personal grooming (such as brushing teeth) 3  -RB      Eating meals 3  -RB      AM-PAC 6 Clicks Score (OT) 14  -RB         Functional Assessment    Outcome Measure Options AM-PAC 6 Clicks Daily Activity (OT)  -RB            User Key  (r) = Recorded By, (t) = Taken By, (c) = Cosigned By    Initials Name Provider Type    RB Fredi France, OT Occupational Therapist                 Time Calculation:    PT Charges     Row Name 05/19/23 1434             Time Calculation    Start Time 1345  -RW      Stop Time 1416  -RW      Time Calculation (min) 31 min  -RW         Time Calculation- PT    Total Timed Code Minutes- PT 31 minute(s)  -RW         Timed Charges    29921 - PT Therapeutic Activity Minutes 31  -RW         Total Minutes    Timed Charges Total Minutes 31  -RW       Total Minutes 31  -RW            User Key  (r) = Recorded By, (t) = Taken By, (c) = Cosigned By    Initials Name Provider Type    RW Sergio Fuentes PTA Physical Therapist Assistant              Therapy Charges for Today     Code Description Service Date Service Provider Modifiers Qty    [id number]  PT THERAPEUTIC ACT EA 15 MIN 5/19/2023 Sergio Fuentes PTA GP 2          PT G-Codes  Outcome Measure Options: AM-PAC 6 Clicks Daily Activity (OT)  AM-PAC 6 Clicks Score  (PT): 11  AM-PAC 6 Clicks Score (OT): 14    Sergio Fuentes, PTA  5/19/2023

## 2023-05-19 NOTE — PLAN OF CARE
Goal Outcome Evaluation:  Plan of Care Reviewed With: patient           Outcome Evaluation: OT tx complete. Pt supine in bed upon arrival and agreeable to therapy. Pt perf sup<>sit with SBA. Pt sat EOB ~7 minutes to perform grooming ax with set up assist. Pt dependent to shave face while supine in bed. No goals met. Cont OT POC. All needs within reach.

## 2023-05-20 LAB
ALBUMIN SERPL-MCNC: 2.7 G/DL (ref 3.5–5.2)
ALBUMIN/GLOB SERPL: 0.8 G/DL
ALP SERPL-CCNC: 105 U/L (ref 39–117)
ALT SERPL W P-5'-P-CCNC: 12 U/L (ref 1–33)
ANION GAP SERPL CALCULATED.3IONS-SCNC: 11 MMOL/L (ref 5–15)
AST SERPL-CCNC: 15 U/L (ref 1–32)
BASOPHILS # BLD AUTO: 0.11 10*3/MM3 (ref 0–0.2)
BASOPHILS NFR BLD AUTO: 0.6 % (ref 0–1.5)
BILIRUB SERPL-MCNC: 0.6 MG/DL (ref 0–1.2)
BUN SERPL-MCNC: 12 MG/DL (ref 8–23)
BUN/CREAT SERPL: 10.4 (ref 7–25)
CALCIUM SPEC-SCNC: 8.8 MG/DL (ref 8.6–10.5)
CHLORIDE SERPL-SCNC: 98 MMOL/L (ref 98–107)
CO2 SERPL-SCNC: 27 MMOL/L (ref 22–29)
CREAT SERPL-MCNC: 1.15 MG/DL (ref 0.57–1)
DEPRECATED RDW RBC AUTO: 42.2 FL (ref 37–54)
EGFRCR SERPLBLD CKD-EPI 2021: 54.3 ML/MIN/1.73
EOSINOPHIL # BLD AUTO: 0.31 10*3/MM3 (ref 0–0.4)
EOSINOPHIL NFR BLD AUTO: 1.8 % (ref 0.3–6.2)
ERYTHROCYTE [DISTWIDTH] IN BLOOD BY AUTOMATED COUNT: 13.5 % (ref 12.3–15.4)
GLOBULIN UR ELPH-MCNC: 3.2 GM/DL
GLUCOSE BLDC GLUCOMTR-MCNC: 189 MG/DL (ref 70–130)
GLUCOSE BLDC GLUCOMTR-MCNC: 210 MG/DL (ref 70–130)
GLUCOSE BLDC GLUCOMTR-MCNC: 289 MG/DL (ref 70–130)
GLUCOSE BLDC GLUCOMTR-MCNC: 294 MG/DL (ref 70–130)
GLUCOSE SERPL-MCNC: 198 MG/DL (ref 65–99)
HCT VFR BLD AUTO: 32.6 % (ref 34–46.6)
HGB BLD-MCNC: 10.7 G/DL (ref 12–15.9)
IMM GRANULOCYTES # BLD AUTO: 0.27 10*3/MM3 (ref 0–0.05)
IMM GRANULOCYTES NFR BLD AUTO: 1.6 % (ref 0–0.5)
LYMPHOCYTES # BLD AUTO: 1.19 10*3/MM3 (ref 0.7–3.1)
LYMPHOCYTES NFR BLD AUTO: 6.9 % (ref 19.6–45.3)
MCH RBC QN AUTO: 28.2 PG (ref 26.6–33)
MCHC RBC AUTO-ENTMCNC: 32.8 G/DL (ref 31.5–35.7)
MCV RBC AUTO: 86 FL (ref 79–97)
MONOCYTES # BLD AUTO: 1.26 10*3/MM3 (ref 0.1–0.9)
MONOCYTES NFR BLD AUTO: 7.3 % (ref 5–12)
NEUTROPHILS NFR BLD AUTO: 14.04 10*3/MM3 (ref 1.7–7)
NEUTROPHILS NFR BLD AUTO: 81.8 % (ref 42.7–76)
NRBC BLD AUTO-RTO: 0 /100 WBC (ref 0–0.2)
PLATELET # BLD AUTO: 308 10*3/MM3 (ref 140–450)
PMV BLD AUTO: 10 FL (ref 6–12)
POTASSIUM SERPL-SCNC: 3 MMOL/L (ref 3.5–5.2)
POTASSIUM SERPL-SCNC: 3.5 MMOL/L (ref 3.5–5.2)
PROT SERPL-MCNC: 5.9 G/DL (ref 6–8.5)
RBC # BLD AUTO: 3.79 10*6/MM3 (ref 3.77–5.28)
SODIUM SERPL-SCNC: 136 MMOL/L (ref 136–145)
WBC NRBC COR # BLD: 17.18 10*3/MM3 (ref 3.4–10.8)

## 2023-05-20 PROCEDURE — 63710000001 INSULIN ASPART PER 5 UNITS: Performed by: HOSPITALIST

## 2023-05-20 PROCEDURE — 82948 REAGENT STRIP/BLOOD GLUCOSE: CPT

## 2023-05-20 PROCEDURE — 63710000001 INSULIN DETEMIR PER 5 UNITS: Performed by: HOSPITALIST

## 2023-05-20 PROCEDURE — 0 POTASSIUM CHLORIDE 10 MEQ/100ML SOLUTION: Performed by: INTERNAL MEDICINE

## 2023-05-20 PROCEDURE — 97535 SELF CARE MNGMENT TRAINING: CPT

## 2023-05-20 PROCEDURE — 25010000002 PIPERACILLIN SOD-TAZOBACTAM PER 1 G: Performed by: HOSPITALIST

## 2023-05-20 PROCEDURE — 25010000002 HEPARIN (PORCINE) PER 1000 UNITS: Performed by: HOSPITALIST

## 2023-05-20 PROCEDURE — 80053 COMPREHEN METABOLIC PANEL: CPT | Performed by: HOSPITALIST

## 2023-05-20 PROCEDURE — 25010000002 VANCOMYCIN 10 G RECONSTITUTED SOLUTION: Performed by: HOSPITALIST

## 2023-05-20 PROCEDURE — 25010000002 SODIUM CHLORIDE 0.9 % WITH KCL 20 MEQ 20-0.9 MEQ/L-% SOLUTION: Performed by: INTERNAL MEDICINE

## 2023-05-20 PROCEDURE — 85025 COMPLETE CBC W/AUTO DIFF WBC: CPT | Performed by: HOSPITALIST

## 2023-05-20 PROCEDURE — 84132 ASSAY OF SERUM POTASSIUM: CPT | Performed by: INTERNAL MEDICINE

## 2023-05-20 RX ORDER — POLYETHYLENE GLYCOL 3350 17 G/17G
17 POWDER, FOR SOLUTION ORAL DAILY PRN
Status: DISCONTINUED | OUTPATIENT
Start: 2023-05-20 | End: 2023-06-08 | Stop reason: HOSPADM

## 2023-05-20 RX ORDER — NALOXONE HCL 0.4 MG/ML
0.4 VIAL (ML) INJECTION
Status: DISCONTINUED | OUTPATIENT
Start: 2023-05-20 | End: 2023-06-08 | Stop reason: HOSPADM

## 2023-05-20 RX ORDER — SODIUM CHLORIDE 0.9 % (FLUSH) 0.9 %
10 SYRINGE (ML) INJECTION AS NEEDED
Status: DISCONTINUED | OUTPATIENT
Start: 2023-05-20 | End: 2023-06-08 | Stop reason: HOSPADM

## 2023-05-20 RX ORDER — GUAIFENESIN/DEXTROMETHORPHAN 100-10MG/5
5 SYRUP ORAL EVERY 4 HOURS PRN
Status: DISCONTINUED | OUTPATIENT
Start: 2023-05-20 | End: 2023-06-08 | Stop reason: HOSPADM

## 2023-05-20 RX ORDER — BISACODYL 10 MG
10 SUPPOSITORY, RECTAL RECTAL DAILY PRN
Status: DISCONTINUED | OUTPATIENT
Start: 2023-05-20 | End: 2023-06-08 | Stop reason: HOSPADM

## 2023-05-20 RX ORDER — DEXTROSE MONOHYDRATE 25 G/50ML
25 INJECTION, SOLUTION INTRAVENOUS
Status: DISCONTINUED | OUTPATIENT
Start: 2023-05-20 | End: 2023-06-06 | Stop reason: SDUPTHER

## 2023-05-20 RX ORDER — INSULIN ASPART 100 [IU]/ML
0-7 INJECTION, SOLUTION INTRAVENOUS; SUBCUTANEOUS
Status: DISCONTINUED | OUTPATIENT
Start: 2023-05-21 | End: 2023-05-20

## 2023-05-20 RX ORDER — POTASSIUM CHLORIDE 750 MG/1
40 CAPSULE, EXTENDED RELEASE ORAL EVERY 4 HOURS
Status: COMPLETED | OUTPATIENT
Start: 2023-05-20 | End: 2023-05-21

## 2023-05-20 RX ORDER — ONDANSETRON 2 MG/ML
4 INJECTION INTRAMUSCULAR; INTRAVENOUS EVERY 6 HOURS PRN
Status: DISCONTINUED | OUTPATIENT
Start: 2023-05-20 | End: 2023-06-08 | Stop reason: HOSPADM

## 2023-05-20 RX ORDER — BISACODYL 5 MG/1
5 TABLET, DELAYED RELEASE ORAL DAILY PRN
Status: DISCONTINUED | OUTPATIENT
Start: 2023-05-20 | End: 2023-06-08 | Stop reason: HOSPADM

## 2023-05-20 RX ORDER — SODIUM CHLORIDE AND POTASSIUM CHLORIDE 150; 900 MG/100ML; MG/100ML
125 INJECTION, SOLUTION INTRAVENOUS CONTINUOUS
Status: DISCONTINUED | OUTPATIENT
Start: 2023-05-20 | End: 2023-05-21

## 2023-05-20 RX ORDER — POTASSIUM CHLORIDE 7.45 MG/ML
10 INJECTION INTRAVENOUS
Status: COMPLETED | OUTPATIENT
Start: 2023-05-20 | End: 2023-05-20

## 2023-05-20 RX ORDER — SODIUM CHLORIDE 0.9 % (FLUSH) 0.9 %
10 SYRINGE (ML) INJECTION EVERY 12 HOURS SCHEDULED
Status: DISCONTINUED | OUTPATIENT
Start: 2023-05-20 | End: 2023-06-08 | Stop reason: HOSPADM

## 2023-05-20 RX ORDER — AMOXICILLIN 250 MG
2 CAPSULE ORAL 2 TIMES DAILY
Status: DISCONTINUED | OUTPATIENT
Start: 2023-05-20 | End: 2023-06-08 | Stop reason: HOSPADM

## 2023-05-20 RX ORDER — NICOTINE POLACRILEX 4 MG
15 LOZENGE BUCCAL
Status: DISCONTINUED | OUTPATIENT
Start: 2023-05-20 | End: 2023-06-06 | Stop reason: SDUPTHER

## 2023-05-20 RX ORDER — SODIUM CHLORIDE 9 MG/ML
40 INJECTION, SOLUTION INTRAVENOUS AS NEEDED
Status: DISCONTINUED | OUTPATIENT
Start: 2023-05-20 | End: 2023-06-08 | Stop reason: HOSPADM

## 2023-05-20 RX ORDER — LEVOFLOXACIN 5 MG/ML
750 INJECTION, SOLUTION INTRAVENOUS EVERY 24 HOURS
Status: DISCONTINUED | OUTPATIENT
Start: 2023-05-20 | End: 2023-05-20

## 2023-05-20 RX ADMIN — ATORVASTATIN CALCIUM 80 MG: 40 TABLET, FILM COATED ORAL at 08:54

## 2023-05-20 RX ADMIN — POTASSIUM CHLORIDE 10 MEQ: 7.46 INJECTION, SOLUTION INTRAVENOUS at 11:29

## 2023-05-20 RX ADMIN — SUCRALFATE 1 G: 1 TABLET ORAL at 20:23

## 2023-05-20 RX ADMIN — VANCOMYCIN HYDROCHLORIDE 1000 MG: 10 INJECTION, POWDER, LYOPHILIZED, FOR SOLUTION INTRAVENOUS at 02:57

## 2023-05-20 RX ADMIN — Medication 10 ML: at 08:55

## 2023-05-20 RX ADMIN — SUCRALFATE 1 G: 1 TABLET ORAL at 11:29

## 2023-05-20 RX ADMIN — VENLAFAXINE HYDROCHLORIDE 75 MG: 75 CAPSULE, EXTENDED RELEASE ORAL at 08:54

## 2023-05-20 RX ADMIN — ISOSORBIDE MONONITRATE 30 MG: 30 TABLET, EXTENDED RELEASE ORAL at 08:54

## 2023-05-20 RX ADMIN — POTASSIUM CHLORIDE AND SODIUM CHLORIDE 125 ML/HR: 900; 150 INJECTION, SOLUTION INTRAVENOUS at 20:20

## 2023-05-20 RX ADMIN — POTASSIUM CHLORIDE 10 MEQ: 7.46 INJECTION, SOLUTION INTRAVENOUS at 14:39

## 2023-05-20 RX ADMIN — INSULIN DETEMIR 40 UNITS: 100 INJECTION, SOLUTION SUBCUTANEOUS at 20:25

## 2023-05-20 RX ADMIN — VANCOMYCIN HYDROCHLORIDE 1000 MG: 10 INJECTION, POWDER, LYOPHILIZED, FOR SOLUTION INTRAVENOUS at 17:00

## 2023-05-20 RX ADMIN — ASPIRIN 325 MG: 325 TABLET, COATED ORAL at 08:54

## 2023-05-20 RX ADMIN — PIPERACILLIN SODIUM AND TAZOBACTAM SODIUM 3.38 G: 3; .375 INJECTION, POWDER, LYOPHILIZED, FOR SOLUTION INTRAVENOUS at 18:16

## 2023-05-20 RX ADMIN — SUCRALFATE 1 G: 1 TABLET ORAL at 17:14

## 2023-05-20 RX ADMIN — HEPARIN SODIUM 5000 UNITS: 5000 INJECTION INTRAVENOUS; SUBCUTANEOUS at 20:20

## 2023-05-20 RX ADMIN — LEVOTHYROXINE SODIUM 50 MCG: 50 TABLET ORAL at 08:59

## 2023-05-20 RX ADMIN — DOCUSATE SODIUM 50 MG AND SENNOSIDES 8.6 MG 2 TABLET: 8.6; 5 TABLET, FILM COATED ORAL at 20:20

## 2023-05-20 RX ADMIN — ARIPIPRAZOLE 5 MG: 5 TABLET ORAL at 08:54

## 2023-05-20 RX ADMIN — GABAPENTIN 200 MG: 100 CAPSULE ORAL at 20:20

## 2023-05-20 RX ADMIN — POTASSIUM CHLORIDE 10 MEQ: 7.46 INJECTION, SOLUTION INTRAVENOUS at 13:34

## 2023-05-20 RX ADMIN — HYDROCHLOROTHIAZIDE 12.5 MG: 12.5 TABLET ORAL at 08:54

## 2023-05-20 RX ADMIN — CLOPIDOGREL BISULFATE 75 MG: 75 TABLET ORAL at 08:53

## 2023-05-20 RX ADMIN — RANOLAZINE 500 MG: 500 TABLET, FILM COATED, EXTENDED RELEASE ORAL at 08:54

## 2023-05-20 RX ADMIN — LORAZEPAM 0.5 MG: 0.5 TABLET ORAL at 01:29

## 2023-05-20 RX ADMIN — PIPERACILLIN SODIUM AND TAZOBACTAM SODIUM 3.38 G: 3; .375 INJECTION, POWDER, LYOPHILIZED, FOR SOLUTION INTRAVENOUS at 01:28

## 2023-05-20 RX ADMIN — POTASSIUM CHLORIDE 10 MEQ: 7.46 INJECTION, SOLUTION INTRAVENOUS at 09:29

## 2023-05-20 RX ADMIN — HEPARIN SODIUM 5000 UNITS: 5000 INJECTION INTRAVENOUS; SUBCUTANEOUS at 14:43

## 2023-05-20 RX ADMIN — HYDROCODONE BITARTRATE AND ACETAMINOPHEN 1 TABLET: 7.5; 325 TABLET ORAL at 01:28

## 2023-05-20 RX ADMIN — INSULIN ASPART 5 UNITS: 100 INJECTION, SOLUTION INTRAVENOUS; SUBCUTANEOUS at 10:58

## 2023-05-20 RX ADMIN — HEPARIN SODIUM 5000 UNITS: 5000 INJECTION INTRAVENOUS; SUBCUTANEOUS at 09:03

## 2023-05-20 RX ADMIN — GABAPENTIN 200 MG: 100 CAPSULE ORAL at 08:53

## 2023-05-20 RX ADMIN — GUAIFENESIN AND DEXTROMETHORPHAN 5 ML: 100; 10 SYRUP ORAL at 20:21

## 2023-05-20 RX ADMIN — POTASSIUM CHLORIDE 10 MEQ: 7.46 INJECTION, SOLUTION INTRAVENOUS at 12:30

## 2023-05-20 RX ADMIN — INSULIN ASPART 8 UNITS: 100 INJECTION, SOLUTION INTRAVENOUS; SUBCUTANEOUS at 17:10

## 2023-05-20 RX ADMIN — FUROSEMIDE 40 MG: 40 TABLET ORAL at 08:54

## 2023-05-20 RX ADMIN — INSULIN ASPART 3 UNITS: 100 INJECTION, SOLUTION INTRAVENOUS; SUBCUTANEOUS at 07:30

## 2023-05-20 RX ADMIN — PANTOPRAZOLE SODIUM 40 MG: 40 TABLET, DELAYED RELEASE ORAL at 08:54

## 2023-05-20 RX ADMIN — POTASSIUM CHLORIDE 10 MEQ: 7.46 INJECTION, SOLUTION INTRAVENOUS at 10:23

## 2023-05-20 RX ADMIN — FOLIC ACID 1000 MCG: 1 TABLET ORAL at 08:53

## 2023-05-20 RX ADMIN — PIPERACILLIN SODIUM AND TAZOBACTAM SODIUM 3.38 G: 3; .375 INJECTION, POWDER, LYOPHILIZED, FOR SOLUTION INTRAVENOUS at 10:29

## 2023-05-20 RX ADMIN — CELECOXIB 100 MG: 100 CAPSULE ORAL at 08:54

## 2023-05-20 RX ADMIN — TAMSULOSIN HYDROCHLORIDE 0.4 MG: 0.4 CAPSULE ORAL at 08:54

## 2023-05-20 RX ADMIN — SUCRALFATE 1 G: 1 TABLET ORAL at 08:53

## 2023-05-20 RX ADMIN — AMLODIPINE BESYLATE 5 MG: 5 TABLET ORAL at 08:54

## 2023-05-20 RX ADMIN — POTASSIUM CHLORIDE 40 MEQ: 10 CAPSULE, COATED, EXTENDED RELEASE ORAL at 22:02

## 2023-05-20 RX ADMIN — RANOLAZINE 500 MG: 500 TABLET, FILM COATED, EXTENDED RELEASE ORAL at 20:20

## 2023-05-20 NOTE — PLAN OF CARE
Goal Outcome Evaluation:  Plan of Care Reviewed With: patient        Progress: improving  Outcome Evaluation: Pt is agreesable to OT tx this am. Pt is SBA for supine-sit for grooming tasks EOB. Pt is SBA for self feeding and drinking from a cup following set up. No new goals met

## 2023-05-20 NOTE — THERAPY TREATMENT NOTE
Patient Name: Ariana Martinez  : 1960    MRN: [id number]                              Today's Date: 2023       Admit Date: 2023    Visit Dx:     ICD-10-CM ICD-9-CM   1. Sepsis without acute organ dysfunction, due to unspecified organism  A41.9 038.9     995.91   2. Cellulitis of right lower extremity  L03.115 682.6   3. Type 2 diabetes mellitus with hyperglycemia, unspecified whether long term insulin use  E11.65 250.00   4. Impaired mobility and activities of daily living  Z74.09 V49.89    Z78.9    5. Impaired physical mobility  Z74.09 781.99   6. Impaired mobility and ADLs  Z74.09 V49.89    Z78.9      Patient Active Problem List   Diagnosis   • 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, with long-term current use of insulin   • Closed nondisplaced fracture of fifth left metatarsal bone   • MAURICIO (generalized anxiety disorder)   • Depression, major, recurrent, moderate (HCC)   • GERD without esophagitis   • Long term prescription opiate use   • Mixed hyperlipidemia   • Vitamin D deficiency   • Seasonal allergic rhinitis   • Restrictive lung disease secondary to obesity   • Adult body mass index 37.0-37.9   • Snoring   • Class 3 severe obesity due to excess calories without serious comorbidity with body mass index (BMI) of 40.0 to 44.9 in adult (HCC)   • (HFpEF) heart failure with preserved ejection fraction   • Type 2 diabetes mellitus with hyperglycemia, with long-term current use of insulin (HCC)   • Cyanocobalamin deficiency   • Coronary artery disease of native artery of native heart with stable angina pectoris   • Hypertension   • Meniere's disease   • Gastroparesis   • Pulmonary hypertension (HCC)   • Pes cavus   • Primary osteoarthritis involving multiple joints   • Generalized anxiety disorder   • Chronic right-sided low back pain with right-sided sciatica   • Chest pain   • Adverse effect of iron   • Chest pain due to myocardial ischemia   • Nonrheumatic tricuspid  valve regurgitation   • Iron deficiency anemia due to chronic blood loss   • Malaise and fatigue   • Ankle arthritis   • Urinary retention   • Endocarditis   • Essential hypertension   • Occlusion and stenosis of bilateral carotid arteries   • S/P BKA (below knee amputation) unilateral, left (HCC)   • Phantom pain after amputation of lower extremity   • S/P CABG (coronary artery bypass graft)   • Severe malnutrition   • TIA (transient ischemic attack)   • Coronary artery abnormality   • Elevated d-dimer   • Neuropathy   • Chest pain, unspecified type   • Acute pain of right shoulder   • Rotator cuff syndrome, right   • Acquired hypothyroidism   • Bilateral leg edema   • Left elbow pain   • Gastritis   • Olecranon bursitis, left elbow   • Sepsis without acute organ dysfunction, due to unspecified organism     Past Medical History:   Diagnosis Date   • Acute bacterial endocarditis 3/13/2021   • Angina, class IV    • Anxiety    • Anxiety and depression    • Arthritis    • Benign paroxysmal positional vertigo    • Bladder disorder     has bladder stimulator   • Carpal tunnel syndrome    • CHF (congestive heart failure)    • Chronic pain    • Coronary atherosclerosis     hx CABG 2005.  is followed by Dr Kwon   • Depression    • Diabetes mellitus     Type 2, controlled   • Diabetic polyneuropathy    • Disease of thyroid gland    • Elevated cholesterol    • Female stress incontinence    • Foot pain, left    • Full dentures    • Gastroparesis    • GERD (gastroesophageal reflux disease)    • Hyperlipidemia    • Hypertension    • Low back pain    • Malaise and fatigue    • Multiple joint pain    • Obesity     Refuses to be weighed   • Occlusion and stenosis of bilateral carotid arteries 6/18/2021   • Otalgia     Both   • Perforation of tympanic membrane     Left   • Postoperative wound infection    • Vitamin D deficiency    • Wears glasses     reading     Past Surgical History:   Procedure Laterality Date   • ABDOMINAL  SURGERY     • AMPUTATION FOOT     • ANGIOPLASTY      coronary   • ANKLE ARTHROSCOPY Left 02/26/2021    Procedure: Left foot hardware removal, ankle arthroscopy, bone grafting , left foot exostectomy;  Surgeon: Ignacio Lord DPM;  Location: [facility] OR;  Service: Podiatry;  Laterality: Left;   • BREAST BIOPSY Right    • CARDIAC CATHETERIZATION     • CARDIAC CATHETERIZATION N/A 06/20/2017    Procedure: Right Heart Cath;  Surgeon: Can Kwon MD PhD;  Location: [facility] CATH INVASIVE LOCATION;  Service:    • CARDIAC CATHETERIZATION N/A 02/18/2020    Procedure: Left Heart Cath;  Surgeon: Catalina Cooper MD;  Location: [facility] CATH INVASIVE LOCATION;  Service: Cardiology;  Laterality: N/A;   • CARDIAC CATHETERIZATION N/A 04/06/2022    Procedure: Left Heart Cath;  Surgeon: Sheryl Navas MD;  Location: [facility] CATH INVASIVE LOCATION;  Service: Cardiology;  Laterality: N/A;   • CARPAL TUNNEL RELEASE     • CHOLECYSTECTOMY     • COLONOSCOPY N/A 06/24/2020    Procedure: COLONOSCOPY;  Surgeon: Julián Maldonado MD;  Location: [facility] ENDOSCOPY;  Service: Gastroenterology;  Laterality: N/A;   • CORONARY ARTERY BYPASS GRAFT  2005   • ENDOSCOPY N/A 10/19/2018    Procedure: ESOPHAGOGASTRODUODENOSCOPY possible dilation;  Surgeon: Julián Maldonado MD;  Location: [facility] ENDOSCOPY;  Service: Gastroenterology   • ENDOSCOPY N/A 06/24/2020    Procedure: ESOPHAGOGASTRODUODENOSCOPY WED appt please;  Surgeon: Juláin Maldonado MD;  Location: [facility] ENDOSCOPY;  Service: Gastroenterology;  Laterality: N/A;   • ENDOSCOPY N/A 06/10/2022    Procedure: ESOPHAGOGASTRODUODENOSCOPY   room 380;  Surgeon: Jeremiah Wilkins MD;  Location: [facility] ENDOSCOPY;  Service: Gastroenterology;  Laterality: N/A;   • ENDOSCOPY N/A 1/10/2023    Procedure: ESOPHAGOGASTRODUODENOSCOPY;  Surgeon: Jeremiah Wilkins MD;  Location: [facility] ENDOSCOPY;  Service: Gastroenterology;  Laterality: N/A;   • ENDOSCOPY AND COLONOSCOPY     • FOOT SURGERY      Toes   •  FOOT SURGERY     • GASTRIC BANDING      Revision, laparoscopic   • HYSTERECTOMY     • INCISION AND DRAINAGE LEG Left 03/12/2021    Procedure: Left ankle arthroscopic irrigation and debridement, screw removal;  Surgeon: Ignacio Lord DPM;  Location: [facility];  Service: Podiatry;  Laterality: Left;   • MOUTH SURGERY     • SALPINGO OOPHORECTOMY     • SHOULDER SURGERY     • SUBTALAR ARTHRODESIS Left 01/16/2019    Procedure: LEFT FOOT HARDWARE REMOVAL, FIFTH METATARSAL , OPEN REDUCTION INTERNAL FIXATION, CALCANEAL OSTEOTOMY;  Surgeon: Ignacio Lord DPM;  Location: [facility];  Service: Podiatry   • SUBTALAR ARTHRODESIS Left 10/16/2019    Procedure: foot hardware removal, subtalar joint fusion  possible de/reattachment of achilles tendon        (c-arm);  Surgeon: Ignacio Lord DPM;  Location: [facility];  Service: Podiatry   • SUBTALAR ARTHRODESIS Left 09/30/2020    Procedure: subtalar, talonavicular joint arthrodesis.  Removal hardware.          (c-arm);  Surgeon: Ignacio Lord DPM;  Location: [facility];  Service: Podiatry;  Laterality: Left;   • TRANSESOPHAGEAL ECHOCARDIOGRAM (LAMONTE)      With color flow      General Information     Row Name 05/20/23 0748          OT Time and Intention    Document Type therapy note (daily note)  -BB     Mode of Treatment individual therapy;occupational therapy  -BB     Row Name 05/20/23 0748          General Information    Patient Profile Reviewed yes  -BB     Existing Precautions/Restrictions fall  -BB     Row Name 05/20/23 0748          Cognition    Orientation Status (Cognition) oriented x 4  -BB     Row Name 05/20/23 0748          Safety Issues, Functional Mobility    Impairments Affecting Function (Mobility) endurance/activity tolerance;strength;pain  -BB           User Key  (r) = Recorded By, (t) = Taken By, (c) = Cosigned By    Initials Name Provider Type    BB Matilde Rios COTA Occupational Therapist Assistant                 Mobility/ADL's     Row  Name 05/20/23 0748          Bed Mobility    Bed Mobility supine-sit;sit-supine;scooting/bridging  -BB     Supine-Sit Love (Bed Mobility) standby assist;contact guard  -BB     Sit-Supine Love (Bed Mobility) contact guard  -BB     Assistive Device (Bed Mobility) head of bed elevated;bed rails  -BB     Row Name 05/20/23 0748          Bed-Chair Transfer    Bed-Chair Love (Transfers) unable to assess  -BB     Row Name 05/20/23 07          Sit-Stand Transfer    Sit-Stand Love (Transfers) unable to assess  -BB     Row Name 05/20/23 07          Functional Mobility    Functional Mobility- Ind. Level not tested  -BB     Row Name 05/20/23 07          Activities of Daily Living    BADL Assessment/Intervention feeding  -     Row Name 05/20/23 07          Grooming Assessment/Training    Love Level (Grooming) oral care regimen;wash face, hands;set up;supervision;hair care, combing/brushing  -BB     Position (Grooming) edge of bed sitting  -BB     Row Name 05/20/23 07          Self-Feeding Assessment/Training    Love Level (Feeding) liquids to mouth;scoop food and bring to mouth;supervision  -BB     Position (Self-Feeding) sitting up in bed  -BB           User Key  (r) = Recorded By, (t) = Taken By, (c) = Cosigned By    Initials Name Provider Type    Matilde Childers COTA Occupational Therapist Assistant               Obj/Interventions     Row Name 05/20/23 0748          Range of Motion Comprehensive    General Range of Motion bilateral upper extremity ROM WFL  -     Row Name 05/20/23 0748          Strength Comprehensive (MMT)    General Manual Muscle Testing (MMT) Assessment other (see comments)  -BB           User Key  (r) = Recorded By, (t) = Taken By, (c) = Cosigned By    Initials Name Provider Type    Matilde Childers COTA Occupational Therapist Assistant               Goals/Plan     Row Name 05/20/23 0748          Transfer Goal 1 (OT)     Activity/Assistive Device (Transfer Goal 1, OT) sit-to-stand/stand-to-sit;toilet;wheelchair transfer;bed-to-chair/chair-to-bed  -BB     [place] Level/Cues Needed (Transfer Goal 1, OT) contact guard required  -BB     Time Frame (Transfer Goal 1, OT) long term goal (LTG)  -BB     Row Name 05/20/23 0748          Bathing Goal 1 (OT)    Activity/Device (Bathing Goal 1, OT) bathing skills, all  -BB     [place] Level/Cues Needed (Bathing Goal 1, OT) contact guard required  -BB     Time Frame (Bathing Goal 1, OT) long term goal (LTG)  -BB     Progress/Outcomes (Bathing Goal 1, OT) goal not met  -BB     Row Name 05/20/23 0748          Dressing Goal 1 (OT)    Activity/Device (Dressing Goal 1, OT) dressing skills, all  -BB     [place]/Cues Needed (Dressing Goal 1, OT) contact guard required  -BB     Time Frame (Dressing Goal 1, OT) long term goal (LTG)  -BB     Progress/Outcome (Dressing Goal 1, OT) goal not met  -BB     Row Name 05/20/23 0748          Toileting Goal 1 (OT)    Activity/Device (Toileting Goal 1, OT) toileting skills, all  -BB     [place] Level/Cues Needed (Toileting Goal 1, OT) minimum assist (75% or more patient effort)  -BB     Time Frame (Toileting Goal 1, OT) long term goal (LTG)  -BB     Progress/Outcome (Toileting Goal 1, OT) goal not met  -BB     Row Name 05/20/23 0748          Self-Feeding Goal 1 (OT)    Activity/Device (Self-Feeding Goal 1, OT) self-feeding skills, all  -BB     [place] Level/Cues Needed (Self-Feeding Goal 1, OT) modified independence  -BB     Time Frame (Self-Feeding Goal 1, OT) long term goal (LTG)  -BB     Progress/Outcomes (Self-Feeding Goal 1, OT) goal not met  -BB           User Key  (r) = Recorded By, (t) = Taken By, (c) = Cosigned By    Initials Name Provider Type    BB Matilde Rios COTA Occupational Therapist Assistant               Clinical Impression     Row Name 05/20/23 0748          Pain Assessment    Pretreatment Pain Rating 6/10  -BB      Posttreatment Pain Rating 7/10  -BB     Pain Location - Side/Orientation Bilateral  -BB     Pain Location lower  -BB     Pain Location - extremity  -BB     Pain Intervention(s) Medication (See MAR)  -BB     Row Name 05/20/23 0748          Plan of Care Review    Plan of Care Reviewed With patient  -BB     Progress improving  -BB     Outcome Evaluation Pt is agreesable to OT tx this am. Pt is SBA for supine-sit for grooming tasks EOB. Pt is SBA for self feeding and drinking from a cup following set up. No new goals met  -BB     Row Name 05/20/23 0748          Therapy Assessment/Plan (OT)    Rehab Potential (OT) fair, will monitor progress closely  -BB     Criteria for Skilled Therapeutic Interventions Met (OT) yes;meets criteria  -BB     Therapy Frequency (OT) other (see comments)  5-7 days/wk  -BB     Row Name 05/20/23 0748          Therapy Plan Review/Discharge Plan (OT)    Anticipated Discharge Disposition (OT) home with assist;home with 24/7 care  -BB     Row Name 05/20/23 0748          Vital Signs    Pretreatment Heart Rate (beats/min) 73  -BB     Pre SpO2 (%) 96  -BB     O2 Delivery Pre Treatment supplemental O2  -BB     Pre Patient Position Supine  -BB     Row Name 05/20/23 0748          Positioning and Restraints    Pre-Treatment Position in bed  -BB     Post Treatment Position bed  -BB     In Bed fowlers;call light within reach;encouraged to call for assist;exit alarm on  -BB           User Key  (r) = Recorded By, (t) = Taken By, (c) = Cosigned By    Initials Name Provider Type    BB Matilde Rios COTA Occupational Therapist Assistant               Outcome Measures     Row Name 05/20/23 0748          How much help from another is currently needed...    Putting on and taking off regular lower body clothing? 2  -BB     Bathing (including washing, rinsing, and drying) 2  -BB     Toileting (which includes using toilet bed pan or urinal) 2  -BB     Putting on and taking off regular upper body clothing 3   -BB     Taking care of personal grooming (such as brushing teeth) 3  -BB     Eating meals 3  -BB     AM-PAC 6 Clicks Score (OT) 15  -BB           User Key  (r) = Recorded By, (t) = Taken By, (c) = Cosigned By    Initials Name Provider Type    BB Matilde Rios COTA Occupational Therapist Assistant                Occupational Therapy Education     Title: PT OT SLP Therapies (In Progress)     Topic: Occupational Therapy (Done)     Point: ADL training (Done)     Description:   Instruct learner(s) on proper safety adaptation and remediation techniques during self care or transfers.   Instruct in proper use of assistive devices.              Learning Progress Summary           Patient Acceptance, E,TB, VU by BB at 5/20/2023 1358    Acceptance, E,TB, VU by BB at 5/20/2023 1357                   Point: Home exercise program (Done)     Description:   Instruct learner(s) on appropriate technique for monitoring, assisting and/or progressing therapeutic exercises/activities.              Learning Progress Summary           Patient Acceptance, E,TB, VU by BB at 5/20/2023 1356                   Point: Precautions (Done)     Description:   Instruct learner(s) on prescribed precautions during self-care and functional transfers.              Learning Progress Summary           Patient Acceptance, E, DU by RB at 5/18/2023 1352    Comment: Pt edu on use of gait belt and non skid socks when OOB and no OOB without assist.                   Point: Body mechanics (Done)     Description:   Instruct learner(s) on proper positioning and spine alignment during self-care, functional mobility activities and/or exercises.              Learning Progress Summary           Patient Acceptance, E,TB, VU by BB at 5/20/2023 1358    Acceptance, E,TB, VU by BB at 5/20/2023 1357                               User Key     Initials Effective Dates Name Provider Type Discipline     06/16/21 -  Fredi France, OT Occupational Therapist OT    NICOLE  06/16/21 -  Matilde Rios COTA Occupational Therapist Assistant OT              OT Recommendation and Plan  Therapy Frequency (OT): other (see comments) (5-7 days/wk)  Plan of Care Review  Plan of Care Reviewed With: patient  Progress: improving  Outcome Evaluation: Pt is agreesable to OT tx this am. Pt is SBA for supine-sit for grooming tasks EOB. Pt is SBA for self feeding and drinking from a cup following set up. No new goals met     Time Calculation:    Time Calculation- OT     Row Name 05/20/23 1358             Time Calculation- OT    OT Start Time 0748  -BB      OT Stop Time 0842  -BB      OT Time Calculation (min) 54 min  -BB      Total Timed Code Minutes- OT 54 minute(s)  -BB      OT Received On 05/20/23  -BB         Timed Charges    76750 - OT Self Care/Mgmt Minutes 54  -BB         Total Minutes    Timed Charges Total Minutes 54  -BB       Total Minutes 54  -BB            User Key  (r) = Recorded By, (t) = Taken By, (c) = Cosigned By    Initials Name Provider Type    BB Matilde Rios COTA Occupational Therapist Assistant              Therapy Charges for Today     Code Description Service Date Service Provider Modifiers Qty    [id number] HC OT SELF CARE/MGMT/TRAIN EA 15 MIN 5/20/2023 Matilde Rios COTA GO 4               LINDA Henley  5/20/2023

## 2023-05-20 NOTE — PLAN OF CARE
Goal Outcome Evaluation:           Progress: no change  Outcome Evaluation: VSS. Antibiotics given. Potassium replaced. No acute events.

## 2023-05-20 NOTE — PROGRESS NOTES
[facility] HOSPITALIST PROGRESS NOTE     Patient Identification:  Name:  Ariana Martinez  Age:  63 y.o.  Sex:  female  :  1960  MRN:  [id number]  Visit Number:  [id number]  Primary Care Provider:  Dolly Foss APRN    Length of stay:  3        Subjective:    Seen and evaluated with the nursing staff.  She continues to complain of severe pain in her right lower extremity.  ----------------------------------------------------------------------------------------------------------------------  Current Hospital Meds:  [START ON 2023] !Vancomycin Level Draw Needed, , Does not apply, Once  amLODIPine, 5 mg, Oral, Daily  ARIPiprazole, 5 mg, Oral, Daily  aspirin EC, 325 mg, Oral, Daily  atorvastatin, 80 mg, Oral, Daily  clopidogrel, 75 mg, Oral, Daily  folic acid, 1,000 mcg, Oral, Daily  furosemide, 40 mg, Oral, Daily  gabapentin, 200 mg, Oral, Q12H  heparin (porcine), 5,000 Units, Subcutaneous, Q8H  hydroCHLOROthiazide, 12.5 mg, Oral, Daily  Insulin Aspart, 0-14 Units, Subcutaneous, TID AC  insulin detemir, 40 Units, Subcutaneous, Nightly  isosorbide mononitrate, 30 mg, Oral, Daily  levothyroxine, 50 mcg, Oral, QAM  pantoprazole, 40 mg, Oral, Daily  piperacillin-tazobactam, 3.375 g, Intravenous, Q8H  potassium chloride, 10 mEq, Intravenous, Q1H  ranolazine, 500 mg, Oral, Q12H  sodium chloride, 10 mL, Intravenous, Q12H  sucralfate, 1 g, Oral, 4x Daily  tamsulosin, 0.4 mg, Oral, Daily  vancomycin, 1,000 mg, Intravenous, Q12H  venlafaxine XR, 75 mg, Oral, Daily With Breakfast      Pharmacy to dose vancomycin,   sodium chloride, 100 mL/hr, Last Rate: 100 mL/hr (23)      ----------------------------------------------------------------------------------------------------------------------  Vital Signs:  Temp:  [96.8 °F (36 °C)-97.7 °F (36.5 °C)] 96.9 °F (36.1 °C)  Heart Rate:  [61-79] 70  Resp:  [18] 18  BP: ()/(44-63) 118/57      23  1340 23  9010  05/19/23  0411   Weight: 126 kg (277 lb 12.8 oz) 127 kg (280 lb) 131 kg (289 lb 1.6 oz)     Body mass index is 43.96 kg/m².    Intake/Output Summary (Last 24 hours) at 5/20/2023 1245  Last data filed at 5/20/2023 1223  Gross per 24 hour   Intake 1160 ml   Output 3275 ml   Net -2115 ml     Diet: Cardiac Diets, Diabetic Diets; Healthy Heart (2-3 Na+); Consistent Carbohydrate; Texture: Regular Texture (IDDSI 7); Fluid Consistency: Thin (IDDSI 0)  ----------------------------------------------------------------------------------------------------------------------  Physical exam:  Constitutional:  Middle aged woman, in mild distress..      HENT:  Head:  Normocephalic and atraumatic.  Mouth:  Moist mucous membranes.    Eyes:  Conjunctivae and EOM are normal.  Pupils are equal, round, and reactive to light.  No scleral icterus.   Neck:  Neck supple.  No JVD present.    Cardiovascular:  Normal rate, regular rhythm and normal heart sounds with no murmur.  Pulmonary/Chest:  No respiratory distress, no wheezes, no crackles, with normal breath sounds and good air movement.  Abdominal:  Soft and obese.  Bowel sounds are normal.  No distension and no tenderness.   Musculoskeletal:  Trace edema bilateral upper extremity. Left BKA plus 1+ edema right lower extremity  Neurological:  Alert and oriented to person, place, and time.  No cranial nerve deficit.  No tongue deviation.  No facial droop.  No slurred speech.   Skin: Right lower extremity erythematous, swollen with open wound on the fourth toe dorsal surface  Peripheral vascular:  Strong pulses in  extremities with no clubbing, no cyanosis.  1+ edema right lower extremity.  Genitourinary: Jose catheter in place  ---------------------------------------------------------------------------------------------------------------------  Tele:    ----------------------------------------------------------------------------------------------------------------------  Results from last 7  days   Lab Units 05/19/23  0611 05/19/23  0246 05/17/23  0954   HSTROP T ng/L 26* 25* 39*     Results from last 7 days   Lab Units 05/20/23  0625 05/19/23  0246 05/18/23  0556 05/18/23  0000 05/17/23  0954   LACTATE mmol/L  --   --   --  1.2 1.7   WBC 10*3/mm3 17.18* 19.52* 16.99*  --  19.65*   HEMOGLOBIN g/dL 10.7* 11.5* 10.7*  --  11.8*   HEMATOCRIT % 32.6* 35.1 31.8*  --  34.1   MCV fL 86.0 86.9 84.8  --  85.0   MCHC g/dL 32.8 32.8 33.6  --  34.6   PLATELETS 10*3/mm3 308 207 233  --  249     Results from last 7 days   Lab Units 05/17/23  1026   PH, ARTERIAL pH units 7.451*   PO2 ART mm Hg 65.7*   PCO2, ARTERIAL mm Hg 36.0   HCO3 ART mmol/L 25.1     Results from last 7 days   Lab Units 05/20/23  0625 05/19/23  0246 05/18/23  1949 05/18/23  0556 05/17/23  1728 05/17/23  0954   SODIUM mmol/L 136 131*  --  130*  --  128*   POTASSIUM mmol/L 3.0* 3.7 4.0 3.2*   < > 3.3*   MAGNESIUM mg/dL  --   --   --   --   --  1.6   CHLORIDE mmol/L 98 96*  --  98  --  92*   CO2 mmol/L 27.0 23.0  --  23.0  --  21.0*   BUN mg/dL 12 15  --  15  --  14   CREATININE mg/dL 1.15* 1.12*  --  1.10*  --  1.26*   CALCIUM mg/dL 8.8 8.5*  --  8.3*  --  8.5*   GLUCOSE mg/dL 198* 181*  --  247*  --  307*   ALBUMIN g/dL 2.7* 2.9*  --  2.7*  --  3.0*   BILIRUBIN mg/dL 0.6 0.6  --  0.6  --  0.7   ALK PHOS U/L 105 90  --  84  --  90   AST (SGOT) U/L 15 16  --  12  --  13   ALT (SGPT) U/L 12 13  --  11  --  11    < > = values in this interval not displayed.   Estimated Creatinine Clearance: 73.6 mL/min (A) (by C-G formula based on SCr of 1.15 mg/dL (H)).    No results found for: AMMONIA      Blood Culture   Date Value Ref Range Status   05/17/2023 No growth at 3 days  Preliminary   05/17/2023 No growth at 3 days  Preliminary     No results found for: URINECX  No results found for: WOUNDCX  No results found for: STOOLCX    I have personally looked at the labs and they are summarized  above.  ----------------------------------------------------------------------------------------------------------------------  Imaging Results (Last 24 Hours)     Procedure Component Value Units Date/Time    US Venous Doppler Lower Extremity Right (duplex) [[id number]] Collected: 05/19/23 4115     Updated: 05/19/23 7390    Narrative:        TECHNIQUE:  High-resolution gray scale imaging, color flow Doppler, compression were  performed from the groin to the calf of the right lower extremity.  Augmentation  was performed of the right common femoral vein and popliteal vein.    FINDINGS:  No evidence of DVT.  There is a 3.7 cm nonspecific right inguinal lymph node.  No other significant findings.    SUMMARY:  Right inguinal adenopathy.  No evidence of DVT.        ----------------------------------------------------------------------------------------------------------------------  Assessment and Plan:  Active Hospital Problems     Diagnosis     • **Sepsis without acute organ dysfunction, due to unspecified organism     Right lower extremity cellulitis  -Ultrasound is negative for DVT  -Prop up the right lower extremity  -continue with IV antibiotics - Zosyn and vancomycin,  -Continue with multimodal pain management  - Celebrex 100 mg twice a day    Hypokalemia.  Replace per protocol     Hypertension  -continue with amlodipine   -Continue with blood pressure monitoring     Coronary artery disease  -continue with aspirin, isosorbide, ranolazine, atorvastatin     Hyperlipidemia  -continue with Lipitor     Type 2 diabetes  -Carbohydrate consistent diet  -continue with sliding scale insulin  - Levemir  - Blood glucose checks     GERD  -continue with Protonix     Morbid obesity.  BMI 43.96.        Status post left BKA.     Urinary retention.    Exact etiology not known.  Continue with Jose catheter care.  Continue p.o. tamsulosin.     Prophylaxis:  DVT-heparin         Conditions and Status:   Patient condition is  guarded        Treatment Plan  As above     Care Planning  Shared decision making: Patient  Code status and discussions: Full code     Disposition  Social Determinants of Health that impact treatment or disposition: None  I expect the patient to be discharged to home in 3 to 4 days.      Santy Rodriguez MD  05/20/23  12:45 CDT     Dragon disclaimer:  Part of this note may be an electronic transcription/translation of spoken language to printed text using the Dragon Dictation System.

## 2023-05-20 NOTE — PROGRESS NOTES
Pharmacokinetics by Pharmacy - Vancomycin     Ariana Martinez is a 63 y.o. female receiving vancomycin 1000 mg IV q12hr (day 4) for SSTI.  Patient is also receiving zosyn.     Objective:         Temp Readings from Last 1 Encounters:   05/20/23 96.9 °F (36.1 °C) (Temporal)            WBC   Date Value Ref Range Status   05/20/2023 17.18 (H) 3.40 - 10.80 10*3/mm3 Final   05/19/2023 19.52 (H) 3.40 - 10.80 10*3/mm3 Final   05/18/2023 16.99 (H) 3.40 - 10.80 10*3/mm3 Final            Lactate   Date Value Ref Range Status   05/18/2023 1.2 0.5 - 2.0 mmol/L Final            Creatinine   Date Value Ref Range Status   05/20/2023 1.15 (H) 0.57 - 1.00 mg/dL Final   05/19/2023 1.12 (H) 0.57 - 1.00 mg/dL Final   05/18/2023 1.10 (H) 0.57 - 1.00 mg/dL Final               Vancomycin Peak   Date Value Ref Range Status   05/19/2023 39.40 20.00 - 40.00 mcg/mL Final            Vancomycin Trough   Date Value Ref Range Status   05/19/2023 20.50 (H) 5.00 - 20.00 mcg/mL Final         Culture Results:          Microbiology Results (last 10 days)      Procedure Component Value - Date/Time     Blood Culture - Blood, Hand, Left [[id number]]  (Normal) Collected: 05/17/23 1151     Lab Status: Preliminary result Specimen: Blood from Hand, Left Updated: 05/20/23 1216       Blood Culture No growth at 3 days     Blood Culture - Blood, Arm, Right [[id number]]  (Normal) Collected: 05/17/23 0954     Lab Status: Preliminary result Specimen: Blood from Arm, Right Updated: 05/20/23 1016       Blood Culture No growth at 3 days     COVID-19 and FLU A/B PCR - Swab, Nasopharynx [[id number]]  (Normal) Collected: 05/17/23 0830     Lab Status: Final result Specimen: Swab from Nasopharynx Updated: 05/17/23 0908       COVID19 Not Detected       Influenza A PCR Not Detected        "Influenza B PCR Not Detected     Narrative:       Fact sheet for providers: https://www.fda.gov/media/583015/download     Fact sheet for patients: https://www.fda.gov/media/590971/download     Test performed by PCR.          No results found for: RESPCX     [Ht: 172.7 cm (68\"); Wt: 131 kg (289 lb 1.6 oz)]   Body mass index is 43.96 kg/m².  Ideal body weight: 63.9 kg (140 lb 14 oz)  Adjusted ideal body weight: 90.8 kg (200 lb 2.6 oz)        Assessment:  • WBCs elevated, afebrile  • Creatinine 1.15 mcg/mL and appears stable   • Cultures  ? 5/17 blood culture NGD3   • AUC and trough goals are 400-600 and 10-20 mcg/mL, respectively.      Plan:  1. Continue Vancomycin 1000 mg IV q12hrs  2. Vancomycin peak and trough to be collected on 5/21 @0600 and 5/21 @1530, respectively (Currently ordered). Consulted until 5/24.  3. Pharmacy will monitor renal function and adjust dose accordingly.     Thank you for this consult.      Rakesh Zamora [facility]   05/20/23 12:40 CDT          "

## 2023-05-21 ENCOUNTER — APPOINTMENT (OUTPATIENT)
Dept: ULTRASOUND IMAGING | Facility: HOSPITAL | Age: 63
DRG: 872 | End: 2023-05-21
Payer: COMMERCIAL

## 2023-05-21 LAB
ALBUMIN SERPL-MCNC: 2.8 G/DL (ref 3.5–5.2)
ALBUMIN/GLOB SERPL: 0.8 G/DL
ALP SERPL-CCNC: 147 U/L (ref 39–117)
ALT SERPL W P-5'-P-CCNC: 12 U/L (ref 1–33)
ANION GAP SERPL CALCULATED.3IONS-SCNC: 10 MMOL/L (ref 5–15)
AST SERPL-CCNC: 18 U/L (ref 1–32)
BASOPHILS # BLD AUTO: 0.13 10*3/MM3 (ref 0–0.2)
BASOPHILS NFR BLD AUTO: 0.8 % (ref 0–1.5)
BILIRUB SERPL-MCNC: 0.6 MG/DL (ref 0–1.2)
BUN SERPL-MCNC: 10 MG/DL (ref 8–23)
BUN/CREAT SERPL: 10.2 (ref 7–25)
CALCIUM SPEC-SCNC: 8.9 MG/DL (ref 8.6–10.5)
CHLORIDE SERPL-SCNC: 98 MMOL/L (ref 98–107)
CO2 SERPL-SCNC: 28 MMOL/L (ref 22–29)
CREAT SERPL-MCNC: 0.98 MG/DL (ref 0.57–1)
DEPRECATED RDW RBC AUTO: 42 FL (ref 37–54)
EGFRCR SERPLBLD CKD-EPI 2021: 65.8 ML/MIN/1.73
EOSINOPHIL # BLD AUTO: 0.41 10*3/MM3 (ref 0–0.4)
EOSINOPHIL NFR BLD AUTO: 2.5 % (ref 0.3–6.2)
ERYTHROCYTE [DISTWIDTH] IN BLOOD BY AUTOMATED COUNT: 13.3 % (ref 12.3–15.4)
GLOBULIN UR ELPH-MCNC: 3.7 GM/DL
GLUCOSE BLDC GLUCOMTR-MCNC: 226 MG/DL (ref 70–130)
GLUCOSE BLDC GLUCOMTR-MCNC: 252 MG/DL (ref 70–130)
GLUCOSE BLDC GLUCOMTR-MCNC: 272 MG/DL (ref 70–130)
GLUCOSE SERPL-MCNC: 236 MG/DL (ref 65–99)
HCT VFR BLD AUTO: 33.1 % (ref 34–46.6)
HGB BLD-MCNC: 10.9 G/DL (ref 12–15.9)
IMM GRANULOCYTES # BLD AUTO: 0.73 10*3/MM3 (ref 0–0.05)
IMM GRANULOCYTES NFR BLD AUTO: 4.5 % (ref 0–0.5)
LYMPHOCYTES # BLD AUTO: 1.53 10*3/MM3 (ref 0.7–3.1)
LYMPHOCYTES NFR BLD AUTO: 9.4 % (ref 19.6–45.3)
MAGNESIUM SERPL-MCNC: 1.8 MG/DL (ref 1.6–2.4)
MCH RBC QN AUTO: 28.2 PG (ref 26.6–33)
MCHC RBC AUTO-ENTMCNC: 32.9 G/DL (ref 31.5–35.7)
MCV RBC AUTO: 85.8 FL (ref 79–97)
MONOCYTES # BLD AUTO: 1.22 10*3/MM3 (ref 0.1–0.9)
MONOCYTES NFR BLD AUTO: 7.5 % (ref 5–12)
NEUTROPHILS NFR BLD AUTO: 12.26 10*3/MM3 (ref 1.7–7)
NEUTROPHILS NFR BLD AUTO: 75.3 % (ref 42.7–76)
NRBC BLD AUTO-RTO: 0 /100 WBC (ref 0–0.2)
PLATELET # BLD AUTO: 395 10*3/MM3 (ref 140–450)
PMV BLD AUTO: 9.5 FL (ref 6–12)
POTASSIUM SERPL-SCNC: 3.6 MMOL/L (ref 3.5–5.2)
POTASSIUM SERPL-SCNC: 4.4 MMOL/L (ref 3.5–5.2)
PROT SERPL-MCNC: 6.5 G/DL (ref 6–8.5)
RBC # BLD AUTO: 3.86 10*6/MM3 (ref 3.77–5.28)
SODIUM SERPL-SCNC: 136 MMOL/L (ref 136–145)
VANCOMYCIN PEAK SERPL-MCNC: 30.8 MCG/ML (ref 20–40)
VANCOMYCIN TROUGH SERPL-MCNC: 19.3 MCG/ML (ref 5–20)
WBC NRBC COR # BLD: 16.28 10*3/MM3 (ref 3.4–10.8)

## 2023-05-21 PROCEDURE — 80202 ASSAY OF VANCOMYCIN: CPT | Performed by: HOSPITALIST

## 2023-05-21 PROCEDURE — 84132 ASSAY OF SERUM POTASSIUM: CPT | Performed by: INTERNAL MEDICINE

## 2023-05-21 PROCEDURE — 25010000002 HEPARIN (PORCINE) PER 1000 UNITS: Performed by: HOSPITALIST

## 2023-05-21 PROCEDURE — 82948 REAGENT STRIP/BLOOD GLUCOSE: CPT

## 2023-05-21 PROCEDURE — 25010000002 VANCOMYCIN 10 G RECONSTITUTED SOLUTION: Performed by: HOSPITALIST

## 2023-05-21 PROCEDURE — 63710000001 INSULIN DETEMIR PER 5 UNITS: Performed by: INTERNAL MEDICINE

## 2023-05-21 PROCEDURE — 63710000001 INSULIN ASPART PER 5 UNITS: Performed by: HOSPITALIST

## 2023-05-21 PROCEDURE — 83735 ASSAY OF MAGNESIUM: CPT | Performed by: INTERNAL MEDICINE

## 2023-05-21 PROCEDURE — 97110 THERAPEUTIC EXERCISES: CPT

## 2023-05-21 PROCEDURE — 93971 EXTREMITY STUDY: CPT

## 2023-05-21 PROCEDURE — 85025 COMPLETE CBC W/AUTO DIFF WBC: CPT | Performed by: HOSPITALIST

## 2023-05-21 PROCEDURE — 25010000002 PIPERACILLIN SOD-TAZOBACTAM PER 1 G: Performed by: HOSPITALIST

## 2023-05-21 PROCEDURE — 80053 COMPREHEN METABOLIC PANEL: CPT | Performed by: HOSPITALIST

## 2023-05-21 PROCEDURE — 97530 THERAPEUTIC ACTIVITIES: CPT

## 2023-05-21 RX ORDER — POTASSIUM CHLORIDE 20 MEQ/1
40 TABLET, EXTENDED RELEASE ORAL EVERY 4 HOURS
Status: COMPLETED | OUTPATIENT
Start: 2023-05-21 | End: 2023-05-21

## 2023-05-21 RX ADMIN — RANOLAZINE 500 MG: 500 TABLET, FILM COATED, EXTENDED RELEASE ORAL at 08:10

## 2023-05-21 RX ADMIN — HYDROCHLOROTHIAZIDE 12.5 MG: 12.5 TABLET ORAL at 08:10

## 2023-05-21 RX ADMIN — POTASSIUM CHLORIDE 40 MEQ: 20 TABLET, EXTENDED RELEASE ORAL at 11:56

## 2023-05-21 RX ADMIN — PIPERACILLIN SODIUM AND TAZOBACTAM SODIUM 3.38 G: 3; .375 INJECTION, POWDER, LYOPHILIZED, FOR SOLUTION INTRAVENOUS at 01:21

## 2023-05-21 RX ADMIN — LORAZEPAM 0.5 MG: 0.5 TABLET ORAL at 20:48

## 2023-05-21 RX ADMIN — SUCRALFATE 1 G: 1 TABLET ORAL at 20:48

## 2023-05-21 RX ADMIN — FUROSEMIDE 40 MG: 40 TABLET ORAL at 08:10

## 2023-05-21 RX ADMIN — HYDROCODONE BITARTRATE AND ACETAMINOPHEN 1 TABLET: 7.5; 325 TABLET ORAL at 08:10

## 2023-05-21 RX ADMIN — PIPERACILLIN SODIUM AND TAZOBACTAM SODIUM 3.38 G: 3; .375 INJECTION, POWDER, LYOPHILIZED, FOR SOLUTION INTRAVENOUS at 11:56

## 2023-05-21 RX ADMIN — INSULIN DETEMIR 45 UNITS: 100 INJECTION, SOLUTION SUBCUTANEOUS at 20:50

## 2023-05-21 RX ADMIN — ACETAMINOPHEN 650 MG: 325 TABLET, FILM COATED ORAL at 03:25

## 2023-05-21 RX ADMIN — SUCRALFATE 1 G: 1 TABLET ORAL at 11:56

## 2023-05-21 RX ADMIN — INSULIN ASPART 8 UNITS: 100 INJECTION, SOLUTION INTRAVENOUS; SUBCUTANEOUS at 11:56

## 2023-05-21 RX ADMIN — ATORVASTATIN CALCIUM 80 MG: 40 TABLET, FILM COATED ORAL at 08:10

## 2023-05-21 RX ADMIN — DOCUSATE SODIUM 50 MG AND SENNOSIDES 8.6 MG 2 TABLET: 8.6; 5 TABLET, FILM COATED ORAL at 20:48

## 2023-05-21 RX ADMIN — ARIPIPRAZOLE 5 MG: 5 TABLET ORAL at 08:12

## 2023-05-21 RX ADMIN — VANCOMYCIN HYDROCHLORIDE 1000 MG: 10 INJECTION, POWDER, LYOPHILIZED, FOR SOLUTION INTRAVENOUS at 03:24

## 2023-05-21 RX ADMIN — POTASSIUM CHLORIDE 40 MEQ: 20 TABLET, EXTENDED RELEASE ORAL at 16:51

## 2023-05-21 RX ADMIN — VENLAFAXINE HYDROCHLORIDE 75 MG: 75 CAPSULE, EXTENDED RELEASE ORAL at 08:10

## 2023-05-21 RX ADMIN — Medication 10 ML: at 09:00

## 2023-05-21 RX ADMIN — TAMSULOSIN HYDROCHLORIDE 0.4 MG: 0.4 CAPSULE ORAL at 08:10

## 2023-05-21 RX ADMIN — INSULIN ASPART 5 UNITS: 100 INJECTION, SOLUTION INTRAVENOUS; SUBCUTANEOUS at 08:11

## 2023-05-21 RX ADMIN — CLOPIDOGREL BISULFATE 75 MG: 75 TABLET ORAL at 08:10

## 2023-05-21 RX ADMIN — ISOSORBIDE MONONITRATE 30 MG: 30 TABLET, EXTENDED RELEASE ORAL at 08:10

## 2023-05-21 RX ADMIN — PANTOPRAZOLE SODIUM 40 MG: 40 TABLET, DELAYED RELEASE ORAL at 08:10

## 2023-05-21 RX ADMIN — INSULIN ASPART 8 UNITS: 100 INJECTION, SOLUTION INTRAVENOUS; SUBCUTANEOUS at 16:51

## 2023-05-21 RX ADMIN — AMLODIPINE BESYLATE 5 MG: 5 TABLET ORAL at 08:10

## 2023-05-21 RX ADMIN — GABAPENTIN 200 MG: 100 CAPSULE ORAL at 20:48

## 2023-05-21 RX ADMIN — SUCRALFATE 1 G: 1 TABLET ORAL at 16:50

## 2023-05-21 RX ADMIN — FOLIC ACID 1000 MCG: 1 TABLET ORAL at 08:10

## 2023-05-21 RX ADMIN — HEPARIN SODIUM 5000 UNITS: 5000 INJECTION INTRAVENOUS; SUBCUTANEOUS at 16:50

## 2023-05-21 RX ADMIN — LEVOTHYROXINE SODIUM 50 MCG: 50 TABLET ORAL at 05:10

## 2023-05-21 RX ADMIN — HEPARIN SODIUM 5000 UNITS: 5000 INJECTION INTRAVENOUS; SUBCUTANEOUS at 05:10

## 2023-05-21 RX ADMIN — DOCUSATE SODIUM 50 MG AND SENNOSIDES 8.6 MG 2 TABLET: 8.6; 5 TABLET, FILM COATED ORAL at 08:10

## 2023-05-21 RX ADMIN — POTASSIUM CHLORIDE 40 MEQ: 10 CAPSULE, COATED, EXTENDED RELEASE ORAL at 01:21

## 2023-05-21 RX ADMIN — SUCRALFATE 1 G: 1 TABLET ORAL at 08:10

## 2023-05-21 RX ADMIN — HEPARIN SODIUM 5000 UNITS: 5000 INJECTION INTRAVENOUS; SUBCUTANEOUS at 20:48

## 2023-05-21 RX ADMIN — GABAPENTIN 200 MG: 100 CAPSULE ORAL at 08:10

## 2023-05-21 RX ADMIN — ASPIRIN 325 MG: 325 TABLET, COATED ORAL at 08:10

## 2023-05-21 RX ADMIN — RANOLAZINE 500 MG: 500 TABLET, FILM COATED, EXTENDED RELEASE ORAL at 20:48

## 2023-05-21 NOTE — THERAPY TREATMENT NOTE
Acute Care - Physical Therapy Treatment Note  [facility]     Patient Name: Ariana Martinez  : 1960  MRN: [id number]  Today's Date: 2023      Visit Dx:     ICD-10-CM ICD-9-CM   1. Sepsis without acute organ dysfunction, due to unspecified organism  A41.9 038.9     995.91   2. Cellulitis of right lower extremity  L03.115 682.6   3. Type 2 diabetes mellitus with hyperglycemia, unspecified whether long term insulin use  E11.65 250.00   4. Impaired mobility and activities of daily living  Z74.09 V49.89    Z78.9    5. Impaired physical mobility  Z74.09 781.99   6. Impaired mobility and ADLs  Z74.09 V49.89    Z78.9      Patient Active Problem List   Diagnosis   • 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, with long-term current use of insulin   • Closed nondisplaced fracture of fifth left metatarsal bone   • MAURICIO (generalized anxiety disorder)   • Depression, major, recurrent, moderate (HCC)   • GERD without esophagitis   • Long term prescription opiate use   • Mixed hyperlipidemia   • Vitamin D deficiency   • Seasonal allergic rhinitis   • Restrictive lung disease secondary to obesity   • Adult body mass index 37.0-37.9   • Snoring   • Class 3 severe obesity due to excess calories without serious comorbidity with body mass index (BMI) of 40.0 to 44.9 in adult (HCC)   • (HFpEF) heart failure with preserved ejection fraction   • Type 2 diabetes mellitus with hyperglycemia, with long-term current use of insulin ([facility]ly [facility])   • Cyanocobalamin deficiency   • Coronary artery disease of native artery of native heart with stable angina pectoris   • Hypertension   • Meniere's disease   • Gastroparesis   • Pulmonary hypertension (Formerly [facility])   • Pes cavus   • Primary osteoarthritis involving multiple joints   • Generalized anxiety disorder   • Chronic right-sided low back pain with right-sided sciatica   • Chest pain   • Adverse effect of iron   • Chest pain due to myocardial ischemia   • Nonrheumatic  tricuspid valve regurgitation   • Iron deficiency anemia due to chronic blood loss   • Malaise and fatigue   • Ankle arthritis   • Urinary retention   • Endocarditis   • Essential hypertension   • Occlusion and stenosis of bilateral carotid arteries   • S/P BKA (below knee amputation) unilateral, left (HCC)   • Phantom pain after amputation of lower extremity   • S/P CABG (coronary artery bypass graft)   • Severe malnutrition   • TIA (transient ischemic attack)   • Coronary artery abnormality   • Elevated d-dimer   • Neuropathy   • Chest pain, unspecified type   • Acute pain of right shoulder   • Rotator cuff syndrome, right   • Acquired hypothyroidism   • Bilateral leg edema   • Left elbow pain   • Gastritis   • Olecranon bursitis, left elbow   • Sepsis without acute organ dysfunction, due to unspecified organism     Past Medical History:   Diagnosis Date   • Acute bacterial endocarditis 3/13/2021   • Angina, class IV    • Anxiety    • Anxiety and depression    • Arthritis    • Benign paroxysmal positional vertigo    • Bladder disorder     has bladder stimulator   • Carpal tunnel syndrome    • CHF (congestive heart failure)    • Chronic pain    • Coronary atherosclerosis     hx CABG 2005.  is followed by Dr Kwon   • Depression    • Diabetes mellitus     Type 2, controlled   • Diabetic polyneuropathy    • Disease of thyroid gland    • Elevated cholesterol    • Female stress incontinence    • Foot pain, left    • Full dentures    • Gastroparesis    • GERD (gastroesophageal reflux disease)    • Hyperlipidemia    • Hypertension    • Low back pain    • Malaise and fatigue    • Multiple joint pain    • Obesity     Refuses to be weighed   • Occlusion and stenosis of bilateral carotid arteries 6/18/2021   • Otalgia     Both   • Perforation of tympanic membrane     Left   • Postoperative wound infection    • Vitamin D deficiency    • Wears glasses     reading     Past Surgical History:   Procedure Laterality Date   •  ABDOMINAL SURGERY     • AMPUTATION FOOT     • ANGIOPLASTY      coronary   • ANKLE ARTHROSCOPY Left 02/26/2021    Procedure: Left foot hardware removal, ankle arthroscopy, bone grafting , left foot exostectomy;  Surgeon: Ignacio Lord DPM;  Location: [facility] OR;  Service: Podiatry;  Laterality: Left;   • BREAST BIOPSY Right    • CARDIAC CATHETERIZATION     • CARDIAC CATHETERIZATION N/A 06/20/2017    Procedure: Right Heart Cath;  Surgeon: Can Kwon MD PhD;  Location: [facility] CATH INVASIVE LOCATION;  Service:    • CARDIAC CATHETERIZATION N/A 02/18/2020    Procedure: Left Heart Cath;  Surgeon: Catalina Cooper MD;  Location: [facility] CATH INVASIVE LOCATION;  Service: Cardiology;  Laterality: N/A;   • CARDIAC CATHETERIZATION N/A 04/06/2022    Procedure: Left Heart Cath;  Surgeon: Sheryl Navas MD;  Location: [facility] CATH INVASIVE LOCATION;  Service: Cardiology;  Laterality: N/A;   • CARPAL TUNNEL RELEASE     • CHOLECYSTECTOMY     • COLONOSCOPY N/A 06/24/2020    Procedure: COLONOSCOPY;  Surgeon: Julián Maldonado MD;  Location: [facility] ENDOSCOPY;  Service: Gastroenterology;  Laterality: N/A;   • CORONARY ARTERY BYPASS GRAFT  2005   • ENDOSCOPY N/A 10/19/2018    Procedure: ESOPHAGOGASTRODUODENOSCOPY possible dilation;  Surgeon: Julián Maldonado MD;  Location: [facility] ENDOSCOPY;  Service: Gastroenterology   • ENDOSCOPY N/A 06/24/2020    Procedure: ESOPHAGOGASTRODUODENOSCOPY WED appt please;  Surgeon: Julián Maldonado MD;  Location: [facility] ENDOSCOPY;  Service: Gastroenterology;  Laterality: N/A;   • ENDOSCOPY N/A 06/10/2022    Procedure: ESOPHAGOGASTRODUODENOSCOPY   room 380;  Surgeon: Jeremiah Wilkins MD;  Location: [facility] ENDOSCOPY;  Service: Gastroenterology;  Laterality: N/A;   • ENDOSCOPY N/A 1/10/2023    Procedure: ESOPHAGOGASTRODUODENOSCOPY;  Surgeon: Jeremiah Wilkins MD;  Location: [facility] ENDOSCOPY;  Service: Gastroenterology;  Laterality: N/A;   • ENDOSCOPY AND COLONOSCOPY     • FOOT SURGERY       Toes   • FOOT SURGERY     • GASTRIC BANDING      Revision, laparoscopic   • HYSTERECTOMY     • INCISION AND DRAINAGE LEG Left 03/12/2021    Procedure: Left ankle arthroscopic irrigation and debridement, screw removal;  Surgeon: Ignacio Lord DPM;  Location: [facility];  Service: Podiatry;  Laterality: Left;   • MOUTH SURGERY     • SALPINGO OOPHORECTOMY     • SHOULDER SURGERY     • SUBTALAR ARTHRODESIS Left 01/16/2019    Procedure: LEFT FOOT HARDWARE REMOVAL, FIFTH METATARSAL , OPEN REDUCTION INTERNAL FIXATION, CALCANEAL OSTEOTOMY;  Surgeon: Ignacio Lord DPM;  Location: [facility];  Service: Podiatry   • SUBTALAR ARTHRODESIS Left 10/16/2019    Procedure: foot hardware removal, subtalar joint fusion  possible de/reattachment of achilles tendon        (c-arm);  Surgeon: Ignacio Lord DPM;  Location: [facility];  Service: Podiatry   • SUBTALAR ARTHRODESIS Left 09/30/2020    Procedure: subtalar, talonavicular joint arthrodesis.  Removal hardware.          (c-arm);  Surgeon: Ignacio Lord DPM;  Location: [facility];  Service: Podiatry;  Laterality: Left;   • TRANSESOPHAGEAL ECHOCARDIOGRAM (LAMONTE)      With color flow     PT Assessment (last 12 hours)     PT Evaluation and Treatment     Row Name 05/21/23 4454          Physical Therapy Time and Intention    Document Type therapy note (daily note)  -AME     Mode of Treatment physical therapy  -AME     Patient Effort good  -     Comment pt does not yet have prosthesis or  present. pt reports she is unable to stand on R LE at this time.  -     Row Name 05/21/23 1258          General Information    Patient Profile Reviewed yes  -     Existing Precautions/Restrictions fall  -     Row Name 05/21/23 1258          Pain    Pretreatment Pain Rating 8/10  -     Posttreatment Pain Rating 9/10  -     Pain Location - Side/Orientation Bilateral  -AME     Pain Location lower  -AME     Pain Location - extremity  -     Pain Intervention(s) Repositioned   -     Row Name 05/21/23 1258          Cognition    Affect/Mental Status (Cognition) WFL  -     Orientation Status (Cognition) oriented x 4  -     Personal Safety Interventions fall prevention program maintained;gait belt;muscle strengthening facilitated;nonskid shoes/slippers when out of bed;supervised activity  -     Row Name 05/21/23 1258          Bed Mobility    Bed Mobility rolling left;rolling right;supine-sit;sit-supine  -     Rolling Left [place] (Bed Mobility) minimum assist (75% patient effort)  -     Rolling Right [place] (Bed Mobility) minimum assist (75% patient effort)  -     Scooting/Bridging [place] (Bed Mobility) standby assist;verbal cues  limited distance  -     Supine-Sit [place] (Bed Mobility) minimum assist (75% patient effort)  -     Sit-Supine [place] (Bed Mobility) standby assist;verbal cues  -     Assistive Device (Bed Mobility) head of bed elevated;bed rails  -     Comment, (Bed Mobility) pt scootied some toward HOB wile sitting. SBA for sitting balance.  -     Row Name 05/21/23 1258          Bed-Chair Transfer    Bed-Chair [place] (Transfers) unable to assess  -     Row Name 05/21/23 1258          Sit-Stand Transfer    Sit-Stand [place] (Transfers) unable to assess  -     Row Name 05/21/23 1258          Gait/Stairs (Locomotion)    [place] Level (Gait) unable to assess  -     Row Name 05/21/23 1258          Safety Issues, Functional Mobility    Impairments Affecting Function (Mobility) endurance/activity tolerance;strength;pain  -     Row Name 05/21/23 1258          Motor Skills    Therapeutic Exercise --  SLR 3-way arabella in supine and SL- arabella 10x1, 15x1 L LE ankle DF/PF, QS, GS- 15x1 arabella. single leg bridging- 15x1  -     Row Name             Wound 05/17/23 1828 Right anterior fifth toe    Wound - Properties Group Placement Date: 05/17/23  - Placement Time: 1828  - Side: Right  - Orientation: anterior  -  Location: fifth toe  -    Retired Wound - Properties Group Placement Date: 05/17/23  - Placement Time: 1828 - Side: Right  - Orientation: anterior  - Location: fifth toe  -    Retired Wound - Properties Group Date first assessed: 05/17/23  - Time first assessed: 1828 - Side: Right  - Location: fifth toe  -    Row Name 05/21/23 1258          Vital Signs    Pre Systolic BP Rehab 137  -     Pre Treatment Diastolic BP 62  -     Post Systolic BP Rehab 113  -     Post Treatment Diastolic BP 51  -AME     Pretreatment Heart Rate (beats/min) 70  -     Posttreatment Heart Rate (beats/min) 66  -     Pre SpO2 (%) 93  5 LPM.  -     O2 Delivery Pre Treatment supplemental O2  -     Post SpO2 (%) 93  -     O2 Delivery Post Treatment supplemental O2  -     Pre Patient Position Supine  -     Post Patient Position Supine  -     Row Name 05/21/23 1258          Positioning and Restraints    Pre-Treatment Position in bed  -     Post Treatment Position bed  -     In Bed fowlers;call light within reach;encouraged to call for assist;exit alarm on  all needs met  -     Row Name 05/21/23 1250          Therapy Assessment/Plan (PT)    Rehab Potential (PT) good, to achieve stated therapy goals  -     Criteria for Skilled Interventions Met (PT) yes;meets criteria;skilled treatment is necessary  -     Therapy Frequency (PT) other (see comments)  5-7 days/wk  -     Row Name 05/21/23 1250          Bed Mobility Goal 1 (PT)    Activity/Assistive Device (Bed Mobility Goal 1, PT) bed mobility activities, all  -     [place] Level/Cues Needed (Bed Mobility Goal 1, PT) standby assist;independent  -     Time Frame (Bed Mobility Goal 1, PT) by discharge  -     Progress/Outcomes (Bed Mobility Goal 1, PT) goal met  -     Row Name 05/21/23 1251          Transfer Goal 1 (PT)    Activity/Assistive Device (Transfer Goal 1, PT) sit-to-stand/stand-to-sit;bed-to-chair/chair-to-bed  -      [place] Level/Cues Needed (Transfer Goal 1, PT) contact guard required;standby assist;modified independence  -AME     Time Frame (Transfer Goal 1, PT) by discharge  -AME     Progress/Outcome (Transfer Goal 1, PT) goal not met  -     Row Name 05/21/23 1258          Gait Training Goal 1 (PT)    Activity/Assistive Device (Gait Training Goal 1, PT) gait (walking locomotion);decrease fall risk  -     [place] Level (Gait Training Goal 1, PT) contact guard required;standby assist;modified independence  -AME     Distance (Gait Training Goal 1, PT) 50ft or more each trip  -AME     Time Frame (Gait Training Goal 1, PT) 1 week  -AME     Progress/Outcome (Gait Training Goal 1, PT) goal not met  -     Row Name 05/21/23 1258          ROM Goal 1 (PT)    ROM Goal 1 (PT) Pt will tolerate LE exercises OOB in chair with VSS  -AME     Time Frame (ROM Goal 1, PT) by discharge  -AME     Progress/Outcome (ROM Goal 1, PT) goal not met  -           User Key  (r) = Recorded By, (t) = Taken By, (c) = Cosigned By    Initials Name Provider Type    AME Ousmane Zhang PTA Physical Therapist Assistant    Janae Severino, LPN Licensed Nurse                Physical Therapy Education     Title: PT OT SLP Therapies (In Progress)     Topic: Physical Therapy (In Progress)     Point: Mobility training (Done)     Learning Progress Summary           Patient Acceptance, E,TB, VU by NB at 5/18/2023 2138    Acceptance, E, NR by  at 5/18/2023 1407                   Point: Home exercise program (Not Started)     Learner Progress:  Not documented in this visit.          Point: Body mechanics (Not Started)     Learner Progress:  Not documented in this visit.          Point: Precautions (In Progress)     Learning Progress Summary           Patient Acceptance, E, NR by AME at 5/18/2023 1407                               User Key     Initials Effective Dates Name Provider Type Discipline     06/16/21 -  Aure Villaseñor PT Physical Therapist PT     NB 06/16/21 -  Samantha Cuellar, RN Registered Nurse Nurse              PT Recommendation and Plan  Anticipated Discharge Disposition (PT): home with assist, home with home health  Therapy Frequency (PT): other (see comments) (5-7 days/wk)  Plan of Care Reviewed With: patient  Progress: improving  Outcome Evaluation: pt does not yet have prosthesis or  present and reports she is unable to stand on R LE. pt demonstrated rolling R/L min A, sup-sit-sup min A. scooting limited distance in supine and while sitting EOB with SBA. pt participated in hip 3-way in supine and fowlers as well as other LE ther ex. SL bridging as well. no new goals met at this time. pt would continue to benefit from PT services.   Outcome Measures     Row Name 05/21/23 1258             How much help from another person do you currently need...    Turning from your back to your side while in flat bed without using bedrails? 3  -AME      Moving from lying on back to sitting on the side of a flat bed without bedrails? 3  -AME      Moving to and from a bed to a chair (including a wheelchair)? 1  -AME      Standing up from a chair using your arms (e.g., wheelchair, bedside chair)? 1  -AME      Climbing 3-5 steps with a railing? 1  -AME      To walk in hospital room? 1  -AME      AM-PAC 6 Clicks Score (PT) 10  -AME         Functional Assessment    Outcome Measure Options AM-PAC 6 Clicks Basic Mobility (PT)  -AME            User Key  (r) = Recorded By, (t) = Taken By, (c) = Cosigned By    Initials Name Provider Type    Ousmane Gomes, PTA Physical Therapist Assistant                 Time Calculation:    PT Charges     Row Name 05/21/23 1336             Time Calculation    Start Time 1258  -AME      Stop Time 1336  -AME      Time Calculation (min) 38 min  -AME         Time Calculation- PT    Total Timed Code Minutes- PT 38 minute(s)  -AME         Timed Charges    68162 - PT Therapeutic Exercise Minutes 23  -AME      37206 - PT Therapeutic Activity  Minutes 15  -AME         Total Minutes    Timed Charges Total Minutes 38  -AME       Total Minutes 38  -AME            User Key  (r) = Recorded By, (t) = Taken By, (c) = Cosigned By    Initials Name Provider Type    Ousmane Gomes PTA Physical Therapist Assistant              Therapy Charges for Today     Code Description Service Date Service Provider Modifiers Qty    39679162971 HC PT THER PROC EA 15 MIN 5/21/2023 Ousmane Zhang PTA GP 2    78939074776 HC PT THERAPEUTIC ACT EA 15 MIN 5/21/2023 Ousmane Zhang PTA GP 1          PT G-Codes  Outcome Measure Options: AM-PAC 6 Clicks Basic Mobility (PT)  AM-PAC 6 Clicks Score (PT): 10  AM-PAC 6 Clicks Score (OT): 15    Ousmane Zhang PTA  5/21/2023

## 2023-05-21 NOTE — PROGRESS NOTES
Pharmacokinetics by Pharmacy - Vancomycin    Ariana Martinez is a 63 y.o. female receiving vancomycin 1000 mg IV q12hr (day 5) for SSTI.  Patient is also receiving cefepime (changed from zosyn).     Objective:    Temp Readings from Last 1 Encounters:   05/21/23 96.8 °F (36 °C) (Temporal)     WBC   Date Value Ref Range Status   05/21/2023 16.28 (H) 3.40 - 10.80 10*3/mm3 Final   05/20/2023 17.18 (H) 3.40 - 10.80 10*3/mm3 Final   05/19/2023 19.52 (H) 3.40 - 10.80 10*3/mm3 Final      No results found for: CRP, LACTATE   Creatinine   Date Value Ref Range Status   05/21/2023 0.98 0.57 - 1.00 mg/dL Final   05/20/2023 1.15 (H) 0.57 - 1.00 mg/dL Final   05/19/2023 1.12 (H) 0.57 - 1.00 mg/dL Final       Vancomycin Peak   Date Value Ref Range Status   05/21/2023 30.80 20.00 - 40.00 mcg/mL Final   05/19/2023 39.40 20.00 - 40.00 mcg/mL Final     Vancomycin Trough   Date Value Ref Range Status   05/21/2023 19.30 5.00 - 20.00 mcg/mL Final   05/19/2023 20.50 (H) 5.00 - 20.00 mcg/mL Final       Culture Results:  Microbiology Results (last 10 days)     Procedure Component Value - Date/Time    Blood Culture - Blood, Hand, Left [[id number]]  (Normal) Collected: 05/17/23 1151    Lab Status: Preliminary result Specimen: Blood from Hand, Left Updated: 05/21/23 1217     Blood Culture No growth at 4 days    Blood Culture - Blood, Arm, Right [[id number]]  (Normal) Collected: 05/17/23 0954    Lab Status: Preliminary result Specimen: Blood from Arm, Right Updated: 05/21/23 1016     Blood Culture No growth at 4 days    COVID-19 and FLU A/B PCR - Swab, Nasopharynx [[id number]]  (Normal) Collected: 05/17/23 0830    Lab Status: Final result Specimen: Swab from Nasopharynx Updated: 05/17/23 0908     COVID19 Not Detected     Influenza A PCR Not Detected     Influenza B PCR Not Detected    Narrative:      Fact sheet for providers: https://www.fda.gov/media/110149/download    Fact sheet for patients: https://www.fda.gov/media/641151/download    Test  "performed by PCR.        No results found for: RESPCX    [Ht: 172.7 cm (68\"); Wt: 132 kg (290 lb 1.6 oz)]   Body mass index is 44.11 kg/m².  Ideal body weight: 63.9 kg (140 lb 14 oz)  Adjusted ideal body weight: 91 kg (200 lb 9 oz)      Assessment:  • WBCs elevated but improving, afebrile  • Creatinine 0.98 mcg/mL and improving  • Cultures  o 5/17 blood culture NGD3   • Vancomycin peak and trough resulted at 30.8 mcg/mL and 19.3 mcg/mL, respectively. These give a calculated AUC and trough of 608 and 19.4 mcg/mL, respectively. The AUC is above goal.   • AUC and trough goals are 400-600 and 10-20 mcg/mL, respectively.     Plan:  1. Reduce Vancomycin to 1750 mg IV q24hrs to start tonight @2100, new estimated AUC and trough of 556 and 12.3 mcg/mL, respectively.  2. Vancomycin levels when clinically indicated. Consulted until 5/24.  3. Pharmacy will monitor renal function and adjust dose accordingly.    Thank you for this consult.     Rakesh Zamora [facility]   05/21/23 16:31 CDT    "

## 2023-05-21 NOTE — PLAN OF CARE
Goal Outcome Evaluation:  Plan of Care Reviewed With: patient     Problem: Adult Inpatient Plan of Care  Goal: Plan of Care Review  Outcome: Ongoing, Progressing  Flowsheets (Taken 5/21/2023 1326)  Progress: improving  Plan of Care Reviewed With: patient  Outcome Evaluation: pt does not yet have prosthesis or  present and reports she is unable to stand on R LE. pt demonstrated rolling R/L min A, sup-sit-sup min A. scooting limited distance in supine and while sitting EOB with SBA. pt participated in hip 3-way in supine and SL as well as other LE ther ex. Single-leg bridging as well. no new goals met at this time. pt would continue to benefit from PT services.

## 2023-05-21 NOTE — PLAN OF CARE
Goal Outcome Evaluation: Pt tolerating care this shift. Pt voices no complaints or concerns. Midline d/c'd per md orders.

## 2023-05-21 NOTE — NURSING NOTE
Pt has not complained of pain at all in leg with cellulitis, does complain of phantom pain in left leg, requested ice pack and tylenol for headache. Vitals stable, sleeping much of shift, no new concerns voiced.

## 2023-05-21 NOTE — PROGRESS NOTES
[facility] HOSPITALIST PROGRESS NOTE     Patient Identification:  Name:  Ariana Martinez  Age:  63 y.o.  Sex:  female  :  1960  MRN:  [id number]  Visit Number:  [id number]  Primary Care Provider:  Dolly Foss APRN    Length of stay:  4        Subjective:    Seen and evaluated with the nursing staff.  Overnight patient developed pain swelling in the right upper extremity.  Complained of pain in her left BKA stump.  ----------------------------------------------------------------------------------------------------------------------  Current Hospital Meds:  !Vancomycin Level Draw Needed, , Does not apply, Once  amLODIPine, 5 mg, Oral, Daily  ARIPiprazole, 5 mg, Oral, Daily  aspirin EC, 325 mg, Oral, Daily  atorvastatin, 80 mg, Oral, Daily  clopidogrel, 75 mg, Oral, Daily  folic acid, 1,000 mcg, Oral, Daily  furosemide, 40 mg, Oral, Daily  gabapentin, 200 mg, Oral, Q12H  heparin (porcine), 5,000 Units, Subcutaneous, Q8H  hydroCHLOROthiazide, 12.5 mg, Oral, Daily  Insulin Aspart, 0-14 Units, Subcutaneous, TID AC  insulin detemir, 40 Units, Subcutaneous, Nightly  isosorbide mononitrate, 30 mg, Oral, Daily  levothyroxine, 50 mcg, Oral, QAM  pantoprazole, 40 mg, Oral, Daily  piperacillin-tazobactam, 3.375 g, Intravenous, Q8H  potassium chloride ER, 40 mEq, Oral, Q4H  ranolazine, 500 mg, Oral, Q12H  senna-docusate sodium, 2 tablet, Oral, BID  sodium chloride, 10 mL, Intravenous, Q12H  sodium chloride, 10 mL, Intravenous, Q12H  sucralfate, 1 g, Oral, 4x Daily  tamsulosin, 0.4 mg, Oral, Daily  vancomycin, 1,000 mg, Intravenous, Q12H  venlafaxine XR, 75 mg, Oral, Daily With Breakfast      Pharmacy to dose vancomycin,       ----------------------------------------------------------------------------------------------------------------------  Vital Signs:  Temp:  [96.4 °F (35.8 °C)-97.5 °F (36.4 °C)] 96.8 °F (36 °C)  Heart Rate:  [67-80] 73  Resp:  [16-18] 18  BP: (106-157)/(55-71)  106/55      05/18/23  0500 05/19/23  0411 05/21/23  0329   Weight: 127 kg (280 lb) 131 kg (289 lb 1.6 oz) 132 kg (290 lb 1.6 oz)     Body mass index is 44.11 kg/m².    Intake/Output Summary (Last 24 hours) at 5/21/2023 1225  Last data filed at 5/21/2023 0900  Gross per 24 hour   Intake 720 ml   Output 3925 ml   Net -3205 ml     Diet: Diabetic Diets; Consistent Carbohydrate; Texture: Regular Texture (IDDSI 7); Fluid Consistency: Thin (IDDSI 0)  ----------------------------------------------------------------------------------------------------------------------  Physical exam:  Constitutional:  Middle aged woman, she does not seem to be in acute distress      HENT:  Head:  Normocephalic and atraumatic.  Mouth:  Moist mucous membranes.    Eyes:  Conjunctivae and EOM are normal.  Pupils are equal, round, and reactive to light.  No scleral icterus.   Neck:  Neck supple.  No JVD present.    Cardiovascular:  Normal rate, regular rhythm and normal heart sounds with no murmur.  Pulmonary/Chest:  No respiratory distress, no wheezes, no crackles, with normal breath sounds and good air movement.  Abdominal:  Soft and obese.  Bowel sounds are normal.  No distension and no tenderness.   Musculoskeletal: Decreased swelling and redness of the right lower extremity. Swollen/tender RUE. Left BKA   Neurological:  Alert and oriented to person, place, and time.  No cranial nerve deficit.  No tongue deviation.  No facial droop.  No slurred speech.   Skin: Right lower extremity erythematous, swollen with open wound on the fourth toe dorsal surface  Peripheral vascular:  Strong pulses in  extremities with no clubbing, no cyanosis.  1+ edema right lower extremity.  Genitourinary: Jose catheter in place  ----------------------------------------------------------------------------------------------------------------------  Tele:     ----------------------------------------------------------------------------------------------------------------------  Results from last 7 days   Lab Units 05/19/23 0611 05/19/23 0246 05/17/23  0954   HSTROP T ng/L 26* 25* 39*     Results from last 7 days   Lab Units 05/21/23 0605 05/20/23 0625 05/19/23 0246 05/18/23  0556 05/18/23  0000 05/17/23  0954   LACTATE mmol/L  --   --   --   --  1.2 1.7   WBC 10*3/mm3 16.28* 17.18* 19.52*   < >  --  19.65*   HEMOGLOBIN g/dL 10.9* 10.7* 11.5*   < >  --  11.8*   HEMATOCRIT % 33.1* 32.6* 35.1   < >  --  34.1   MCV fL 85.8 86.0 86.9   < >  --  85.0   MCHC g/dL 32.9 32.8 32.8   < >  --  34.6   PLATELETS 10*3/mm3 395 308 207   < >  --  249    < > = values in this interval not displayed.     Results from last 7 days   Lab Units 05/17/23  1026   PH, ARTERIAL pH units 7.451*   PO2 ART mm Hg 65.7*   PCO2, ARTERIAL mm Hg 36.0   HCO3 ART mmol/L 25.1     Results from last 7 days   Lab Units 05/21/23 0605 05/20/23 2021 05/20/23 0625 05/19/23 0246 05/17/23  1728 05/17/23  0954   SODIUM mmol/L 136  --  136 131*   < > 128*   POTASSIUM mmol/L 3.6 3.5 3.0* 3.7   < > 3.3*   MAGNESIUM mg/dL 1.8  --   --   --   --  1.6   CHLORIDE mmol/L 98  --  98 96*   < > 92*   CO2 mmol/L 28.0  --  27.0 23.0   < > 21.0*   BUN mg/dL 10  --  12 15   < > 14   CREATININE mg/dL 0.98  --  1.15* 1.12*   < > 1.26*   CALCIUM mg/dL 8.9  --  8.8 8.5*   < > 8.5*   GLUCOSE mg/dL 236*  --  198* 181*   < > 307*   ALBUMIN g/dL 2.8*  --  2.7* 2.9*   < > 3.0*   BILIRUBIN mg/dL 0.6  --  0.6 0.6   < > 0.7   ALK PHOS U/L 147*  --  105 90   < > 90   AST (SGOT) U/L 18  --  15 16   < > 13   ALT (SGPT) U/L 12  --  12 13   < > 11    < > = values in this interval not displayed.   Estimated Creatinine Clearance: 86.7 mL/min (by C-G formula based on SCr of 0.98 mg/dL).    No results found for: AMMONIA      Blood Culture   Date Value Ref Range Status   05/17/2023 No growth at 4 days  Preliminary   05/17/2023 No growth at 4  days  Preliminary     No results found for: URINECX  No results found for: WOUNDCX  No results found for: STOOLCX    I have personally looked at the labs and they are summarized above.  ----------------------------------------------------------------------------------------------------------------------  Imaging Results (Last 24 Hours)     Procedure Component Value Units Date/Time    US Venous Doppler Upper Extremity Right (duplex) [[id number]] Collected: 05/21/23 1155     Updated: 05/21/23 1157    Narrative:      US VENOUS DOPPLER UPPER EXTREMITY RIGHT (DUPLEX)    HISTORY: RUE swelling and pain    COMPARISON: NONE    FINDINGS:  Transverse and longitudinal grayscale and Doppler sonographic images of the  right upper extremity were obtained. Spectral analysis was also obtained.    There is normal flow and compressibility of the right subclavian, axillary,  brachial, and forearm veins.    Thrombus is noted in the cephalic vein.      Impression:        1. No evidence of DVT in the right upper extremity.    2. Superficial venous thrombosis in the cephalic vein.            ----------------------------------------------------------------------------------------------------------------------  Assessment and Plan:  Active Hospital Problems     Diagnosis     • **Sepsis without acute organ dysfunction, due to unspecified organism     Right lower extremity cellulitis-improving  -Ultrasound is negative for DVT  -Prop up the right lower extremity  -Continue with multimodal pain management  - Celebrex 100 mg twice a day  - Was on vancomycin and Zosyn.   - Discontinue  Zosyn and start with cefepime.     Hypokalemia.  Replace per protocol     Hypertension  -continue with amlodipine   -Continue with blood pressure monitoring     Coronary artery disease  -continue with aspirin, isosorbide, ranolazine, atorvastatin     Hyperlipidemia  -continue with Lipitor     Type 2 diabetes with persistent hyperglycemia  -Carbohydrate consistent  diet  -continue with sliding scale insulin  -  Increased dose of Levemir to 45 units at bedtime  - Blood glucose checks     GERD  -continue with Protonix     Morbid obesity.  BMI 43.96.        Status post left BKA.     Urinary retention.    Exact etiology not known.  Continue with Jose catheter care.  Continue p.o. tamsulosin.    Right upper extremity swelling/tenderness.  Ultrasound did not show any DVT.  Ultrasound showed superficial thrombosis.  Prop up right upper extremity.  Discontinue midline on the right upper extremity.     Prophylaxis:  DVT-heparin         Conditions and Status:   Patient condition is guarded        Treatment Plan  As above     Care Planning  Shared decision making: Patient  Code status and discussions: Full code     Disposition  Social Determinants of Health that impact treatment or disposition: None  I expect the patient to be discharged to home in 2 to 3 days.      Santy Rodriguez MD  05/21/23  12:25 CDT     Dragon disclaimer:  Part of this note may be an electronic transcription/translation of spoken language to printed text using the Dragon Dictation System.

## 2023-05-22 ENCOUNTER — APPOINTMENT (OUTPATIENT)
Dept: ULTRASOUND IMAGING | Facility: HOSPITAL | Age: 63
DRG: 872 | End: 2023-05-22
Payer: COMMERCIAL

## 2023-05-22 ENCOUNTER — APPOINTMENT (OUTPATIENT)
Dept: INTERVENTIONAL RADIOLOGY/VASCULAR | Facility: HOSPITAL | Age: 63
DRG: 872 | End: 2023-05-22
Payer: COMMERCIAL

## 2023-05-22 LAB
ANION GAP SERPL CALCULATED.3IONS-SCNC: 11 MMOL/L (ref 5–15)
ANISOCYTOSIS BLD QL: ABNORMAL
BACTERIA SPEC AEROBE CULT: NORMAL
BACTERIA SPEC AEROBE CULT: NORMAL
BUN SERPL-MCNC: 9 MG/DL (ref 8–23)
BUN/CREAT SERPL: 10 (ref 7–25)
CALCIUM SPEC-SCNC: 9.1 MG/DL (ref 8.6–10.5)
CHLORIDE SERPL-SCNC: 97 MMOL/L (ref 98–107)
CO2 SERPL-SCNC: 27 MMOL/L (ref 22–29)
CREAT SERPL-MCNC: 0.9 MG/DL (ref 0.57–1)
DEPRECATED RDW RBC AUTO: 40.1 FL (ref 37–54)
EGFRCR SERPLBLD CKD-EPI 2021: 72.9 ML/MIN/1.73
EOSINOPHIL # BLD MANUAL: 0.89 10*3/MM3 (ref 0–0.4)
EOSINOPHIL NFR BLD MANUAL: 6 % (ref 0.3–6.2)
ERYTHROCYTE [DISTWIDTH] IN BLOOD BY AUTOMATED COUNT: 13.2 % (ref 12.3–15.4)
GLUCOSE BLDC GLUCOMTR-MCNC: 208 MG/DL (ref 70–130)
GLUCOSE BLDC GLUCOMTR-MCNC: 219 MG/DL (ref 70–130)
GLUCOSE BLDC GLUCOMTR-MCNC: 221 MG/DL (ref 70–130)
GLUCOSE BLDC GLUCOMTR-MCNC: 260 MG/DL (ref 70–130)
GLUCOSE BLDC GLUCOMTR-MCNC: 275 MG/DL (ref 70–130)
GLUCOSE SERPL-MCNC: 228 MG/DL (ref 65–99)
HCT VFR BLD AUTO: 34.2 % (ref 34–46.6)
HGB BLD-MCNC: 11.8 G/DL (ref 12–15.9)
LYMPHOCYTES # BLD MANUAL: 2.23 10*3/MM3 (ref 0.7–3.1)
LYMPHOCYTES NFR BLD MANUAL: 8 % (ref 5–12)
MCH RBC QN AUTO: 28.7 PG (ref 26.6–33)
MCHC RBC AUTO-ENTMCNC: 34.5 G/DL (ref 31.5–35.7)
MCV RBC AUTO: 83.2 FL (ref 79–97)
MONOCYTES # BLD: 1.19 10*3/MM3 (ref 0.1–0.9)
MYELOCYTES NFR BLD MANUAL: 2 % (ref 0–0)
NEUTROPHILS # BLD AUTO: 10.24 10*3/MM3 (ref 1.7–7)
NEUTROPHILS NFR BLD MANUAL: 69 % (ref 42.7–76)
PLATELET # BLD AUTO: 401 10*3/MM3 (ref 140–450)
PMV BLD AUTO: 10.4 FL (ref 6–12)
POTASSIUM SERPL-SCNC: 3.9 MMOL/L (ref 3.5–5.2)
RBC # BLD AUTO: 4.11 10*6/MM3 (ref 3.77–5.28)
SMALL PLATELETS BLD QL SMEAR: ADEQUATE
SODIUM SERPL-SCNC: 135 MMOL/L (ref 136–145)
VARIANT LYMPHS NFR BLD MANUAL: 1 % (ref 0–5)
VARIANT LYMPHS NFR BLD MANUAL: 14 % (ref 19.6–45.3)
WBC MORPH BLD: NORMAL
WBC NRBC COR # BLD: 14.84 10*3/MM3 (ref 3.4–10.8)

## 2023-05-22 PROCEDURE — 63710000001 INSULIN DETEMIR PER 5 UNITS: Performed by: INTERNAL MEDICINE

## 2023-05-22 PROCEDURE — 85007 BL SMEAR W/DIFF WBC COUNT: CPT | Performed by: HOSPITALIST

## 2023-05-22 PROCEDURE — 05HC33Z INSERTION OF INFUSION DEVICE INTO LEFT BASILIC VEIN, PERCUTANEOUS APPROACH: ICD-10-PCS | Performed by: HOSPITALIST

## 2023-05-22 PROCEDURE — 36410 VNPNXR 3YR/> PHY/QHP DX/THER: CPT

## 2023-05-22 PROCEDURE — 63710000001 INSULIN ASPART PER 5 UNITS: Performed by: HOSPITALIST

## 2023-05-22 PROCEDURE — 0 CEFEPIME PER 500 MG: Performed by: INTERNAL MEDICINE

## 2023-05-22 PROCEDURE — 25010000002 VANCOMYCIN 10 G RECONSTITUTED SOLUTION: Performed by: HOSPITALIST

## 2023-05-22 PROCEDURE — 82948 REAGENT STRIP/BLOOD GLUCOSE: CPT

## 2023-05-22 PROCEDURE — 63710000001 INSULIN DETEMIR PER 5 UNITS: Performed by: HOSPITALIST

## 2023-05-22 PROCEDURE — 97535 SELF CARE MNGMENT TRAINING: CPT

## 2023-05-22 PROCEDURE — C1751 CATH, INF, PER/CENT/MIDLINE: HCPCS

## 2023-05-22 PROCEDURE — 63710000001 ONDANSETRON PER 8 MG: Performed by: HOSPITALIST

## 2023-05-22 PROCEDURE — 25010000002 HEPARIN (PORCINE) PER 1000 UNITS: Performed by: HOSPITALIST

## 2023-05-22 PROCEDURE — 97530 THERAPEUTIC ACTIVITIES: CPT

## 2023-05-22 PROCEDURE — 76937 US GUIDE VASCULAR ACCESS: CPT

## 2023-05-22 PROCEDURE — 80048 BASIC METABOLIC PNL TOTAL CA: CPT | Performed by: INTERNAL MEDICINE

## 2023-05-22 PROCEDURE — 85025 COMPLETE CBC W/AUTO DIFF WBC: CPT | Performed by: HOSPITALIST

## 2023-05-22 RX ORDER — FLUCONAZOLE 150 MG/1
150 TABLET ORAL ONCE
Status: COMPLETED | OUTPATIENT
Start: 2023-05-22 | End: 2023-05-22

## 2023-05-22 RX ORDER — SODIUM CHLORIDE 9 MG/ML
INJECTION, SOLUTION INTRAVENOUS
Status: COMPLETED
Start: 2023-05-22 | End: 2023-05-22

## 2023-05-22 RX ADMIN — HEPARIN SODIUM 5000 UNITS: 5000 INJECTION INTRAVENOUS; SUBCUTANEOUS at 03:38

## 2023-05-22 RX ADMIN — CLOPIDOGREL BISULFATE 75 MG: 75 TABLET ORAL at 09:22

## 2023-05-22 RX ADMIN — DOCUSATE SODIUM 50 MG AND SENNOSIDES 8.6 MG 2 TABLET: 8.6; 5 TABLET, FILM COATED ORAL at 09:21

## 2023-05-22 RX ADMIN — INSULIN ASPART 5 UNITS: 100 INJECTION, SOLUTION INTRAVENOUS; SUBCUTANEOUS at 18:44

## 2023-05-22 RX ADMIN — FLUCONAZOLE 150 MG: 150 TABLET ORAL at 09:22

## 2023-05-22 RX ADMIN — GABAPENTIN 200 MG: 100 CAPSULE ORAL at 20:46

## 2023-05-22 RX ADMIN — Medication 10 ML: at 09:00

## 2023-05-22 RX ADMIN — SODIUM CHLORIDE 40 ML: 9 INJECTION, SOLUTION INTRAVENOUS at 13:27

## 2023-05-22 RX ADMIN — INSULIN DETEMIR 45 UNITS: 100 INJECTION, SOLUTION SUBCUTANEOUS at 20:47

## 2023-05-22 RX ADMIN — RANOLAZINE 500 MG: 500 TABLET, FILM COATED, EXTENDED RELEASE ORAL at 20:46

## 2023-05-22 RX ADMIN — SUCRALFATE 1 G: 1 TABLET ORAL at 09:21

## 2023-05-22 RX ADMIN — INSULIN DETEMIR 20 UNITS: 100 INJECTION, SOLUTION SUBCUTANEOUS at 16:37

## 2023-05-22 RX ADMIN — INSULIN ASPART 5 UNITS: 100 INJECTION, SOLUTION INTRAVENOUS; SUBCUTANEOUS at 11:59

## 2023-05-22 RX ADMIN — PANTOPRAZOLE SODIUM 40 MG: 40 TABLET, DELAYED RELEASE ORAL at 09:22

## 2023-05-22 RX ADMIN — VANCOMYCIN HYDROCHLORIDE 1750 MG: 10 INJECTION, POWDER, LYOPHILIZED, FOR SOLUTION INTRAVENOUS at 15:00

## 2023-05-22 RX ADMIN — HYDROCODONE BITARTRATE AND ACETAMINOPHEN 1 TABLET: 7.5; 325 TABLET ORAL at 11:59

## 2023-05-22 RX ADMIN — GUAIFENESIN AND DEXTROMETHORPHAN 5 ML: 100; 10 SYRUP ORAL at 23:48

## 2023-05-22 RX ADMIN — HEPARIN SODIUM 5000 UNITS: 5000 INJECTION INTRAVENOUS; SUBCUTANEOUS at 12:48

## 2023-05-22 RX ADMIN — RANOLAZINE 500 MG: 500 TABLET, FILM COATED, EXTENDED RELEASE ORAL at 09:21

## 2023-05-22 RX ADMIN — CEFEPIME 2 G: 2 INJECTION, POWDER, FOR SOLUTION INTRAVENOUS at 20:45

## 2023-05-22 RX ADMIN — LORAZEPAM 0.5 MG: 0.5 TABLET ORAL at 12:48

## 2023-05-22 RX ADMIN — HEPARIN SODIUM 5000 UNITS: 5000 INJECTION INTRAVENOUS; SUBCUTANEOUS at 21:01

## 2023-05-22 RX ADMIN — FUROSEMIDE 40 MG: 40 TABLET ORAL at 09:22

## 2023-05-22 RX ADMIN — ARIPIPRAZOLE 5 MG: 5 TABLET ORAL at 09:22

## 2023-05-22 RX ADMIN — Medication 10 ML: at 20:51

## 2023-05-22 RX ADMIN — INSULIN ASPART 5 UNITS: 100 INJECTION, SOLUTION INTRAVENOUS; SUBCUTANEOUS at 09:23

## 2023-05-22 RX ADMIN — ATORVASTATIN CALCIUM 80 MG: 40 TABLET, FILM COATED ORAL at 09:21

## 2023-05-22 RX ADMIN — TAMSULOSIN HYDROCHLORIDE 0.4 MG: 0.4 CAPSULE ORAL at 09:21

## 2023-05-22 RX ADMIN — ACETAMINOPHEN 650 MG: 325 TABLET, FILM COATED ORAL at 16:33

## 2023-05-22 RX ADMIN — VENLAFAXINE HYDROCHLORIDE 75 MG: 75 CAPSULE, EXTENDED RELEASE ORAL at 09:21

## 2023-05-22 RX ADMIN — Medication 10 ML: at 20:50

## 2023-05-22 RX ADMIN — ASPIRIN 325 MG: 325 TABLET, COATED ORAL at 09:22

## 2023-05-22 RX ADMIN — LEVOTHYROXINE SODIUM 50 MCG: 50 TABLET ORAL at 03:38

## 2023-05-22 RX ADMIN — ACETAMINOPHEN 650 MG: 325 TABLET, FILM COATED ORAL at 03:38

## 2023-05-22 RX ADMIN — ISOSORBIDE MONONITRATE 30 MG: 30 TABLET, EXTENDED RELEASE ORAL at 09:22

## 2023-05-22 RX ADMIN — DOCUSATE SODIUM 50 MG AND SENNOSIDES 8.6 MG 2 TABLET: 8.6; 5 TABLET, FILM COATED ORAL at 20:46

## 2023-05-22 RX ADMIN — HYDROCODONE BITARTRATE AND ACETAMINOPHEN 1 TABLET: 7.5; 325 TABLET ORAL at 18:45

## 2023-05-22 RX ADMIN — GABAPENTIN 200 MG: 100 CAPSULE ORAL at 09:22

## 2023-05-22 RX ADMIN — SUCRALFATE 1 G: 1 TABLET ORAL at 20:46

## 2023-05-22 RX ADMIN — HYDROCHLOROTHIAZIDE 12.5 MG: 12.5 TABLET ORAL at 09:21

## 2023-05-22 RX ADMIN — CEFEPIME 2 G: 2 INJECTION, POWDER, FOR SOLUTION INTRAVENOUS at 13:28

## 2023-05-22 RX ADMIN — FOLIC ACID 1000 MCG: 1 TABLET ORAL at 09:21

## 2023-05-22 RX ADMIN — ONDANSETRON HYDROCHLORIDE 4 MG: 4 TABLET, FILM COATED ORAL at 11:59

## 2023-05-22 RX ADMIN — AMLODIPINE BESYLATE 5 MG: 5 TABLET ORAL at 09:22

## 2023-05-22 RX ADMIN — SUCRALFATE 1 G: 1 TABLET ORAL at 11:59

## 2023-05-22 NOTE — SIGNIFICANT NOTE
05/22/23 1416   OTHER   Discipline physical therapy assistant   Rehab Time/Intention   Session Not Performed patient/family declined treatment  (Pt. did not feel up to PT this pm and stated she was hurting.)

## 2023-05-22 NOTE — THERAPY TREATMENT NOTE
Patient Name: Ariana Martinez  : 1960    MRN: [id number]                              Today's Date: 2023       Admit Date: 2023    Visit Dx:     ICD-10-CM ICD-9-CM   1. Sepsis without acute organ dysfunction, due to unspecified organism  A41.9 038.9     995.91   2. Cellulitis of right lower extremity  L03.115 682.6   3. Type 2 diabetes mellitus with hyperglycemia, unspecified whether long term insulin use  E11.65 250.00   4. Impaired mobility and activities of daily living  Z74.09 V49.89    Z78.9    5. Impaired physical mobility  Z74.09 781.99   6. Impaired mobility and ADLs  Z74.09 V49.89    Z78.9      Patient Active Problem List   Diagnosis   • 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, with long-term current use of insulin   • Closed nondisplaced fracture of fifth left metatarsal bone   • MAURICIO (generalized anxiety disorder)   • Depression, major, recurrent, moderate (HCC)   • GERD without esophagitis   • Long term prescription opiate use   • Mixed hyperlipidemia   • Vitamin D deficiency   • Seasonal allergic rhinitis   • Restrictive lung disease secondary to obesity   • Adult body mass index 37.0-37.9   • Snoring   • Class 3 severe obesity due to excess calories without serious comorbidity with body mass index (BMI) of 40.0 to 44.9 in adult (HCC)   • (HFpEF) heart failure with preserved ejection fraction   • Type 2 diabetes mellitus with hyperglycemia, with long-term current use of insulin (HCC)   • Cyanocobalamin deficiency   • Coronary artery disease of native artery of native heart with stable angina pectoris   • Hypertension   • Meniere's disease   • Gastroparesis   • Pulmonary hypertension (HCC)   • Pes cavus   • Primary osteoarthritis involving multiple joints   • Generalized anxiety disorder   • Chronic right-sided low back pain with right-sided sciatica   • Chest pain   • Adverse effect of iron   • Chest pain due to myocardial ischemia   • Nonrheumatic tricuspid  valve regurgitation   • Iron deficiency anemia due to chronic blood loss   • Malaise and fatigue   • Ankle arthritis   • Urinary retention   • Endocarditis   • Essential hypertension   • Occlusion and stenosis of bilateral carotid arteries   • S/P BKA (below knee amputation) unilateral, left (HCC)   • Phantom pain after amputation of lower extremity   • S/P CABG (coronary artery bypass graft)   • Severe malnutrition   • TIA (transient ischemic attack)   • Coronary artery abnormality   • Elevated d-dimer   • Neuropathy   • Chest pain, unspecified type   • Acute pain of right shoulder   • Rotator cuff syndrome, right   • Acquired hypothyroidism   • Bilateral leg edema   • Left elbow pain   • Gastritis   • Olecranon bursitis, left elbow   • Sepsis without acute organ dysfunction, due to unspecified organism     Past Medical History:   Diagnosis Date   • Acute bacterial endocarditis 3/13/2021   • Angina, class IV    • Anxiety    • Anxiety and depression    • Arthritis    • Benign paroxysmal positional vertigo    • Bladder disorder     has bladder stimulator   • Carpal tunnel syndrome    • CHF (congestive heart failure)    • Chronic pain    • Coronary atherosclerosis     hx CABG 2005.  is followed by Dr Kwon   • Depression    • Diabetes mellitus     Type 2, controlled   • Diabetic polyneuropathy    • Disease of thyroid gland    • Elevated cholesterol    • Female stress incontinence    • Foot pain, left    • Full dentures    • Gastroparesis    • GERD (gastroesophageal reflux disease)    • Hyperlipidemia    • Hypertension    • Low back pain    • Malaise and fatigue    • Multiple joint pain    • Obesity     Refuses to be weighed   • Occlusion and stenosis of bilateral carotid arteries 6/18/2021   • Otalgia     Both   • Perforation of tympanic membrane     Left   • Postoperative wound infection    • Vitamin D deficiency    • Wears glasses     reading     Past Surgical History:   Procedure Laterality Date   • ABDOMINAL  SURGERY     • AMPUTATION FOOT     • ANGIOPLASTY      coronary   • ANKLE ARTHROSCOPY Left 02/26/2021    Procedure: Left foot hardware removal, ankle arthroscopy, bone grafting , left foot exostectomy;  Surgeon: Ignacio Lord DPM;  Location: [facility] OR;  Service: Podiatry;  Laterality: Left;   • BREAST BIOPSY Right    • CARDIAC CATHETERIZATION     • CARDIAC CATHETERIZATION N/A 06/20/2017    Procedure: Right Heart Cath;  Surgeon: Can Kwon MD PhD;  Location: [facility] CATH INVASIVE LOCATION;  Service:    • CARDIAC CATHETERIZATION N/A 02/18/2020    Procedure: Left Heart Cath;  Surgeon: Catalina Cooper MD;  Location: [facility] CATH INVASIVE LOCATION;  Service: Cardiology;  Laterality: N/A;   • CARDIAC CATHETERIZATION N/A 04/06/2022    Procedure: Left Heart Cath;  Surgeon: Sheryl Navas MD;  Location: [facility] CATH INVASIVE LOCATION;  Service: Cardiology;  Laterality: N/A;   • CARPAL TUNNEL RELEASE     • CHOLECYSTECTOMY     • COLONOSCOPY N/A 06/24/2020    Procedure: COLONOSCOPY;  Surgeon: Julián Maldonado MD;  Location: [facility] ENDOSCOPY;  Service: Gastroenterology;  Laterality: N/A;   • CORONARY ARTERY BYPASS GRAFT  2005   • ENDOSCOPY N/A 10/19/2018    Procedure: ESOPHAGOGASTRODUODENOSCOPY possible dilation;  Surgeon: Julián Maldonado MD;  Location: [facility] ENDOSCOPY;  Service: Gastroenterology   • ENDOSCOPY N/A 06/24/2020    Procedure: ESOPHAGOGASTRODUODENOSCOPY WED appt please;  Surgeon: Julián Maldonado MD;  Location: [facility] ENDOSCOPY;  Service: Gastroenterology;  Laterality: N/A;   • ENDOSCOPY N/A 06/10/2022    Procedure: ESOPHAGOGASTRODUODENOSCOPY   room 380;  Surgeon: Jeremiah Wilkins MD;  Location: [facility] ENDOSCOPY;  Service: Gastroenterology;  Laterality: N/A;   • ENDOSCOPY N/A 1/10/2023    Procedure: ESOPHAGOGASTRODUODENOSCOPY;  Surgeon: Jeremiah Wilkins MD;  Location: [facility] ENDOSCOPY;  Service: Gastroenterology;  Laterality: N/A;   • ENDOSCOPY AND COLONOSCOPY     • FOOT SURGERY      Toes   •  FOOT SURGERY     • GASTRIC BANDING      Revision, laparoscopic   • HYSTERECTOMY     • INCISION AND DRAINAGE LEG Left 03/12/2021    Procedure: Left ankle arthroscopic irrigation and debridement, screw removal;  Surgeon: Ignacio Lord DPM;  Location: [facility];  Service: Podiatry;  Laterality: Left;   • MOUTH SURGERY     • SALPINGO OOPHORECTOMY     • SHOULDER SURGERY     • SUBTALAR ARTHRODESIS Left 01/16/2019    Procedure: LEFT FOOT HARDWARE REMOVAL, FIFTH METATARSAL , OPEN REDUCTION INTERNAL FIXATION, CALCANEAL OSTEOTOMY;  Surgeon: Ignacio Lord DPM;  Location: [facility];  Service: Podiatry   • SUBTALAR ARTHRODESIS Left 10/16/2019    Procedure: foot hardware removal, subtalar joint fusion  possible de/reattachment of achilles tendon        (c-arm);  Surgeon: Ignacio Lord DPM;  Location: [facility];  Service: Podiatry   • SUBTALAR ARTHRODESIS Left 09/30/2020    Procedure: subtalar, talonavicular joint arthrodesis.  Removal hardware.          (c-arm);  Surgeon: Ignacio Lord DPM;  Location: [facility];  Service: Podiatry;  Laterality: Left;   • TRANSESOPHAGEAL ECHOCARDIOGRAM (LAMONTE)      With color flow      General Information     Row Name 05/22/23 1055          OT Time and Intention    Document Type therapy note (daily note)  -     Mode of Treatment individual therapy;occupational therapy  -     Row Name 05/22/23 1051          General Information    Patient Profile Reviewed yes  -RW     Existing Precautions/Restrictions fall  -     Row Name 05/22/23 1051          Cognition    Orientation Status (Cognition) oriented x 4  -           User Key  (r) = Recorded By, (t) = Taken By, (c) = Cosigned By    Initials Name Provider Type    RW Missy Esteves OT Occupational Therapist                 Mobility/ADL's     Row Name 05/22/23 1055          Bed Mobility    Bed Mobility supine-sit;sit-supine  -RW     Supine-Sit [place] (Bed Mobility) minimum assist (75% patient effort)  -      Sit-Supine [place] (Bed Mobility) standby assist  -RW     Assistive Device (Bed Mobility) head of bed elevated;bed rails  -[facility] Name 05/22/23 1055          Activities of Daily Living    BADL Assessment/Intervention grooming;feeding  -[facility] Name 05/22/23 1055          Grooming Assessment/Training    [place]ood Level (Grooming) oral care regimen;wash face, hands;set up;supervision;hair care, combing/brushing  -RW     Position (Grooming) edge of bed sitting  -[facility] Name 05/22/23 1055          Self-Feeding Assessment/Training    [place] Level (Feeding) feeding skills;set up;supervision  -RW     Position (Self-Feeding) edge of bed sitting  -RW           User Key  (r) = Recorded By, (t) = Taken By, (c) = Cosigned By    Initials Name Provider Type    RW Missy Esteves OT Occupational Therapist               Obj/Interventions    No documentation.                Goals/Plan     [facility] Name 05/22/23 1055          Transfer Goal 1 (OT)    Activity/Assistive Device (Transfer Goal 1, OT) sit-to-stand/stand-to-sit;toilet;wheelchair transfer;bed-to-chair/chair-to-bed  -RW     [place] Level/Cues Needed (Transfer Goal 1, OT) contact guard required  -RW     Time Frame (Transfer Goal 1, OT) long term goal (LTG)  -[facility] Name 05/22/23 1055          Bathing Goal 1 (OT)    Activity/Device (Bathing Goal 1, OT) bathing skills, all  -RW     [place] Level/Cues Needed (Bathing Goal 1, OT) contact guard required  -RW     Time Frame (Bathing Goal 1, OT) long term goal (LTG)  -RW     Progress/Outcomes (Bathing Goal 1, OT) goal not met  -[facility] Name 05/22/23 1055          Dressing Goal 1 (OT)    Activity/Device (Dressing Goal 1, OT) dressing skills, all  -RW     Hood/Cues Needed (Dressing Goal 1, OT) contact guard required  -RW     Time Frame (Dressing Goal 1, OT) long term goal (LTG)  -RW     Progress/Outcome (Dressing Goal 1, OT) goal not met  -[facility] Name 05/22/23 1055          Toileting  Goal 1 (OT)    Activity/Device (Toileting Goal 1, OT) toileting skills, all  -RW     [place] Level/Cues Needed (Toileting Goal 1, OT) minimum assist (75% or more patient effort)  -RW     Time Frame (Toileting Goal 1, OT) long term goal (LTG)  -RW     Progress/Outcome (Toileting Goal 1, OT) goal not met  -RW     Row Name 05/22/23 1055          Self-Feeding Goal 1 (OT)    Activity/Device (Self-Feeding Goal 1, OT) self-feeding skills, all  -RW     [place] Level/Cues Needed (Self-Feeding Goal 1, OT) modified independence  -RW     Time Frame (Self-Feeding Goal 1, OT) long term goal (LTG)  -RW     Progress/Outcomes (Self-Feeding Goal 1, OT) goal not met  -RW           User Key  (r) = Recorded By, (t) = Taken By, (c) = Cosigned By    Initials Name Provider Type    RW Missy Esteves, OT Occupational Therapist               Clinical Impression     Row Name 05/22/23 1055          Pain Assessment    Pretreatment Pain Rating 8/10  -RW     Posttreatment Pain Rating 8/10  -RW     Pain Location - Side/Orientation Bilateral  -RW     Pain Location lower  -RW     Pain Location - extremity  -RW     Row Name 05/22/23 1052          Plan of Care Review    Plan of Care Reviewed With patient  -RW     Outcome Evaluation OT tx complete. Pt supine upon arrival and agreeable to therapy. Pt perf sup>sit with Moncho. Pt sat EOB ~10 minutes with supervision. Pt perf hair combing, oral care, and face washing with set up assist while sitting EOB. Pts lunch arrived and was set up with lunch at EOB, and perf feeding skills with set up assist. Pt asked to sit EOB to eat lunch, however stated feeling lightheaded, pt returned to supine in bed, BP stable. Pt sat up for lunch in bed. No goals met. Cont OT POC. All needs within reach.  -     Row Name 05/22/23 1055          Vital Signs    Pre Systolic BP Rehab 99  -RW     Pre Treatment Diastolic BP 46  -RW     Pretreatment Heart Rate (beats/min) 66  -RW     Pre SpO2 (%) 97  -RW     O2 Delivery  Pre Treatment supplemental O2  -RW     Pre Patient Position Supine  -RW     Post Patient Position Supine  -RW     Row Name 05/22/23 1055          Positioning and Restraints    Pre-Treatment Position in bed  -RW     Post Treatment Position bed  -RW     In Bed notified nsg;fowlers;call light within reach;encouraged to call for assist;exit alarm on  -RW           User Key  (r) = Recorded By, (t) = Taken By, (c) = Cosigned By    Initials Name Provider Type    Missy Burger OT Occupational Therapist               Outcome Measures     Row Name 05/22/23 1055          How much help from another is currently needed...    Putting on and taking off regular lower body clothing? 2  -RW     Bathing (including washing, rinsing, and drying) 2  -RW     Toileting (which includes using toilet bed pan or urinal) 2  -RW     Putting on and taking off regular upper body clothing 3  -RW     Taking care of personal grooming (such as brushing teeth) 4  -RW     Eating meals 4  -RW     AM-PAC 6 Clicks Score (OT) 17  -RW     Row Name 05/22/23 1055          Functional Assessment    Outcome Measure Options AM-PAC 6 Clicks Daily Activity (OT)  -RW           User Key  (r) = Recorded By, (t) = Taken By, (c) = Cosigned By    Initials Name Provider Type    Missy Burger OT Occupational Therapist                Occupational Therapy Education     Title: PT OT SLP Therapies (In Progress)     Topic: Occupational Therapy (Done)     Point: ADL training (Done)     Description:   Instruct learner(s) on proper safety adaptation and remediation techniques during self care or transfers.   Instruct in proper use of assistive devices.              Learning Progress Summary           Patient Acceptance, E,TB, VU by FACUNDO at 5/22/2023 1243    Comment: POC, Role of OT    Acceptance, E,TB, VU by NICOLE at 5/20/2023 1358    Acceptance, E,TB, VU by NICOLE at 5/20/2023 1357                   Point: Home exercise program (Done)     Description:   Instruct learner(s) on  appropriate technique for monitoring, assisting and/or progressing therapeutic exercises/activities.              Learning Progress Summary           Patient Acceptance, E,TB, VU by BB at 5/20/2023 1356                   Point: Precautions (Done)     Description:   Instruct learner(s) on prescribed precautions during self-care and functional transfers.              Learning Progress Summary           Patient Acceptance, E,TB, VU by RW at 5/22/2023 1243    Comment: POC, Role of OT    Acceptance, E, DU by RB at 5/18/2023 1352    Comment: Pt edu on use of gait belt and non skid socks when OOB and no OOB without assist.                   Point: Body mechanics (Done)     Description:   Instruct learner(s) on proper positioning and spine alignment during self-care, functional mobility activities and/or exercises.              Learning Progress Summary           Patient Acceptance, E,TB, VU by RW at 5/22/2023 1243    Comment: POC, Role of OT    Acceptance, E,TB, VU by BB at 5/20/2023 1358    Acceptance, E,TB, VU by BB at 5/20/2023 1357                               User Key     Initials Effective Dates Name Provider Type Discipline    RB 06/16/21 -  Fredi France, OT Occupational Therapist OT    BB 06/16/21 -  Matilde Rios COTA Occupational Therapist Assistant OT    RW 09/22/22 -  Missy Esteves OT Occupational Therapist OT              OT Recommendation and Plan     Plan of Care Review  Plan of Care Reviewed With: patient  Outcome Evaluation: OT tx complete. Pt supine upon arrival and agreeable to therapy. Pt perf sup>sit with Moncho. Pt sat EOB ~10 minutes with supervision. Pt perf hair combing, oral care, and face washing with set up assist while sitting EOB. Pts lunch arrived and was set up with lunch at EOB, and perf feeding skills with set up assist. Pt asked to sit EOB to eat lunch, however stated feeling lightheaded, pt returned to supine in bed, BP stable. Pt sat up for lunch in bed. No goals met. Cont OT  POC. All needs within reach.     Time Calculation:    Time Calculation- OT     Row Name 05/22/23 1243             Time Calculation- OT    OT Start Time 1055  -RW      OT Stop Time 1128  -RW      OT Time Calculation (min) 33 min  -RW      Total Timed Code Minutes- OT 33 minute(s)  -RW      OT Received On 05/22/23  -RW         Timed Charges    93309 - OT Therapeutic Activity Minutes 18  -RW      88215 - OT Self Care/Mgmt Minutes 15  -RW         Total Minutes    Timed Charges Total Minutes 33  -RW       Total Minutes 33  -RW            User Key  (r) = Recorded By, (t) = Taken By, (c) = Cosigned By    Initials Name Provider Type    RW Missy Esteves OT Occupational Therapist              Therapy Charges for Today     Code Description Service Date Service Provider Modifiers Qty    40608170665  OT THERAPEUTIC ACT EA 15 MIN 5/22/2023 Missy Esteves OT GO 1    26653542784 HC OT SELF CARE/MGMT/TRAIN EA 15 MIN 5/22/2023 Missy Esteves OT GO 1               Missy Esteves OT  5/22/2023

## 2023-05-22 NOTE — NURSING NOTE
No Iv access until pt can get a midline tomorrow. MD was made aware this could not be done tonight. Pharmacy called to adjust antibiotics to reflect this.

## 2023-05-22 NOTE — PROGRESS NOTES
"  Pharmacokinetics by Pharmacy - Vancomycin    Ariana Martinez is a 63 y.o. female  [Ht: 172.7 cm (68\"); Wt: 128 kg (282 lb 11.2 oz)] is being treated for skin and soft tissue infection, day 6 of therapy.    Estimated Creatinine Clearance: 92.7 mL/min (by C-G formula based on SCr of 0.9 mg/dL).   Creatinine   Date Value Ref Range Status   05/22/2023 0.90 0.57 - 1.00 mg/dL Final   05/21/2023 0.98 0.57 - 1.00 mg/dL Final   05/20/2023 1.15 (H) 0.57 - 1.00 mg/dL Final      Lab Results   Component Value Date    WBC 14.84 (H) 05/22/2023    WBC 16.28 (H) 05/21/2023    WBC 17.18 (H) 05/20/2023     C-Reactive Protein   Date Value Ref Range Status   02/05/2022 <0.30 0.00 - 0.50 mg/dL Final   05/24/2021 3.20 (H) 0.00 - 0.50 mg/dL Final   04/19/2021 0.31 0.00 - 0.50 mg/dL Final     Lactate   Date Value Ref Range Status   05/18/2023 1.2 0.5 - 2.0 mmol/L Final   05/17/2023 1.7 0.5 - 2.0 mmol/L Final   05/31/2022 1.5 0.5 - 2.0 mmol/L Final       Temp Readings from Last 1 Encounters:   05/22/23 96.4 °F (35.8 °C) (Temporal)      Lab Results   Component Value Date    VANCOPEAK 30.80 05/21/2023    VANCOTROUGH 19.30 05/21/2023         Culture Results:  Microbiology Results (last 10 days)       Procedure Component Value - Date/Time    Blood Culture - Blood, Hand, Left [[id number]]  (Normal) Collected: 05/17/23 1151    Lab Status: Preliminary result Specimen: Blood from Hand, Left Updated: 05/21/23 1217     Blood Culture No growth at 4 days    Blood Culture - Blood, Arm, Right [[id number]]  (Normal) Collected: 05/17/23 0954    Lab Status: Final result Specimen: Blood from Arm, Right Updated: 05/22/23 1016     Blood Culture No growth at 5 days    COVID-19 and FLU A/B PCR - Swab, Nasopharynx [[id number]]  (Normal) Collected: 05/17/23 0830    Lab Status: Final result Specimen: Swab from Nasopharynx Updated: 05/17/23 0908     COVID19 Not Detected     Influenza A PCR Not Detected     Influenza B PCR Not Detected    Narrative:      Fact sheet " for providers: https://www.fda.gov/media/515245/download    Fact sheet for patients: https://www.fda.gov/media/313510/download    Test performed by PCR.              Indication for use: skin and soft tissue infection      Current Vancomycin Dose:  1750 mg IVPB every 24 hours, day 6 of therapy.     Pt is also receiving Cefepime    Assessment/Plan:  Reviewed above labs and cultures.   Pt lost IV access last night. Midline was placed this am. Adjusted time of Vancomycin to be given now at 1200 today. WBC is 14.84, still above goal range but improving. Cr is 0.9. Will continue current dose as dose was adjusted yesterday but pt did not receive. .  Pharmacy will continue to monitor renal function and adjust dose accordingly.    Anita Underwood, PharmD   05/22/23 10:48 CDT

## 2023-05-22 NOTE — PLAN OF CARE
Goal Outcome Evaluation:  Plan of Care Reviewed With: patient           Outcome Evaluation: OT tx complete. Pt supine upon arrival and agreeable to therapy. Pt perf sup>sit with Moncho. Pt sat EOB ~10 minutes with supervision. Pt perf hair combing, oral care, and face washing with set up assist while sitting EOB. Pts lunch arrived and was set up with lunch at EOB, and perf feeding skills with set up assist. Pt asked to sit EOB to eat lunch, however stated feeling lightheaded, pt returned to supine in bed, BP stable. Pt sat up for lunch in bed. No goals met. Cont OT POC. All needs within reach.

## 2023-05-22 NOTE — PROGRESS NOTES
TWO PATIENT IDENTIFIERS WERE USED. THE PATIENT WAS DRAPED WITH A FULL BODY DRAPE AND THE PATIENT'S LEFT ARM WAS PREPPED WITH CHLORA PREP. ULTRASOUND WAS USED TO LOCALIZE THELEFT BASILIC VEIN. SUBCUTANEOUS TISSUE AT THE CATHETER SITE WAS INFILTRATED WITH 2% LIDOCAINE. UNDER ULTRASOUND GUIDANCE, THE VEIN WAS ACCESSED WITH A 21 GAUGE  NEEDLE. AN 0.018 WIRE WAS THEN THREADED THROUGH THE NEEDLE. THE 21 GAUGE NEEDLE WAS REMOVED AND A 4 Chinese SHEATH WAS PLACED OVER THE WIRE INTO THE VEIN.THE MIDLINE CATHETER WAS TRIMMED TO 20CM. THE MIDLINE CATHETER WAS THEN PLACED OVER THE WIRE INTO THE VEIN, THE SHEATH WAS PEELED AWAY, WIRE WAS REMOVED. CATHETER WAS FLUSHED WITH NORMAL SALINE AND CATHETER TIP APPLIED. BIOPATCH PLACED. CATHETER SECURED WITH STAT LOCK AND TEGADERM. PATIENT TOLERATED PROCEDURE WELL. THIS WAS DONE IN THE ANGIOSUITE      IMPRESSION:SUCCESSFUL PLACEMENT OF DUAL LUMEN MIDLINE.           Rowena Phillips  5/22/2023  07:59 CDT

## 2023-05-22 NOTE — PROGRESS NOTES
Enter Query Response Below      Query Response:     Cellulitis related to Diabetes    Electronically signed by Doni Baker MD, 23, 8:46 PM CDT.              If applicable, please update the problem list.     Patient: Ariana Martinez        : 1960  Account: [id number]           Admit Date: 23        How to Respond to this query:       a. Click New Note     b. Answer query within the yellow box.                c. Update the Problem List, if applicable.      If you have any questions about this query contact me at: [email or web address]     ,     62 y/o noted with Sepsis, Cellulitis right lower extremity, Hypertension, Hyperlipidemia, GERD, Bipolar and Diabetes. On , Hemoglobin A1C 9.80. Labs with glucose 181 to 319. Patient treated with monitoring, Sliding Scale Insulin, Zosyn and Vancomycin.     After study, can the patient's condition be specified as:     >>Cellulitis related to Diabetes  >>Cellulitis Not related to Diabetes  >>Other (please specify):___________  >>Unable to determine     By submitting this query, we are merely seeking further clarification of documentation to accurately reflect all conditions that you are monitoring, evaluating, treating or that extend the hospitalization or utilize additional resources of care. Please utilize your independent clinical judgment when addressing the question(s) above.     This query and your response, once completed, will be entered into the legal medical record.    Sincerely,  Jada Espino RN CCDS   Clinical Documentation Integrity Program

## 2023-05-22 NOTE — PROGRESS NOTES
[facility] Medicine Services  INPATIENT PROGRESS NOTE    Length of Stay: 5  Date of Admission: 5/17/2023  Primary Care Physician: Dolly Foss APRN    Subjective   Chief Complaint: Right lower extremity pain and erythema, fever/chills  HPI: Patient states that she is having difficulty with urinary retention, but otherwise is feeling better.    As of today, 05/22/23  Review of Systems   Constitutional: Negative for appetite change, chills, fatigue, fever and unexpected weight change.   Respiratory: Negative for cough, choking, chest tightness, shortness of breath and wheezing.    Cardiovascular: Negative for chest pain, palpitations and leg swelling.   Gastrointestinal: Negative for abdominal pain, blood in stool, constipation, diarrhea, nausea and vomiting.   Genitourinary: Positive for difficulty urinating. Negative for dysuria, flank pain and hematuria.   Skin: Positive for color change.   Neurological: Negative for dizziness, seizures, syncope, speech difficulty, weakness, light-headedness, numbness and headaches.   Hematological: Does not bruise/bleed easily.        All pertinent negatives and positives are as above. All other systems have been reviewed and are negative unless otherwise stated.    Objective    Temp:  [96.4 °F (35.8 °C)-96.9 °F (36.1 °C)] 96.4 °F (35.8 °C)  Heart Rate:  [65-74] 65  Resp:  [14-18] 14  BP: ()/(46-62) 99/46    AM-PAC 6 Clicks Score (PT): 10 (05/21/23 2047)    As of today, 05/22/23  Physical Exam  Vitals reviewed.   Constitutional:       Appearance: She is well-developed.   HENT:      Head: Normocephalic and atraumatic.   Eyes:      Pupils: Pupils are equal, round, and reactive to light.   Cardiovascular:      Rate and Rhythm: Normal rate and regular rhythm.      Heart sounds: Normal heart sounds. No murmur heard.    No friction rub. No gallop.   Pulmonary:      Effort: Pulmonary effort is normal. No respiratory distress.       Breath sounds: Normal breath sounds. No wheezing or rales.   Chest:      Chest wall: No tenderness.   Abdominal:      General: Bowel sounds are normal. There is no distension.      Palpations: Abdomen is soft.      Tenderness: There is no abdominal tenderness. There is no guarding.   Musculoskeletal:      Cervical back: Normal range of motion and neck supple.      Comments: Left BKA   Skin:     General: Skin is warm and dry.      Comments: Right lower extremity erythema improving   Psychiatric:         Behavior: Behavior normal.         Thought Content: Thought content normal.           Results Review:  I have reviewed the labs, radiology results, and diagnostic studies.    Laboratory Data:   Results from last 7 days   Lab Units 05/22/23  0600 05/21/23  1851 05/21/23  0605 05/20/23 2021 05/20/23 0625 05/19/23  0246   SODIUM mmol/L 135*  --  136  --  136 131*   POTASSIUM mmol/L 3.9 4.4 3.6   < > 3.0* 3.7   CHLORIDE mmol/L 97*  --  98  --  98 96*   CO2 mmol/L 27.0  --  28.0  --  27.0 23.0   BUN mg/dL 9  --  10  --  12 15   CREATININE mg/dL 0.90  --  0.98  --  1.15* 1.12*   GLUCOSE mg/dL 228*  --  236*  --  198* 181*   CALCIUM mg/dL 9.1  --  8.9  --  8.8 8.5*   BILIRUBIN mg/dL  --   --  0.6  --  0.6 0.6   ALK PHOS U/L  --   --  147*  --  105 90   ALT (SGPT) U/L  --   --  12  --  12 13   AST (SGOT) U/L  --   --  18  --  15 16   ANION GAP mmol/L 11.0  --  10.0  --  11.0 12.0    < > = values in this interval not displayed.     Estimated Creatinine Clearance: 92.7 mL/min (by C-G formula based on SCr of 0.9 mg/dL).  Results from last 7 days   Lab Units 05/21/23  0605 05/17/23  0954   MAGNESIUM mg/dL 1.8 1.6         Results from last 7 days   Lab Units 05/22/23  0600 05/21/23  0605 05/20/23 0625 05/19/23  0246 05/18/23  0556   WBC 10*3/mm3 14.84* 16.28* 17.18* 19.52* 16.99*   HEMOGLOBIN g/dL 11.8* 10.9* 10.7* 11.5* 10.7*   HEMATOCRIT % 34.2 33.1* 32.6* 35.1 31.8*   PLATELETS 10*3/mm3 401 395 308 207 233            Culture Data:   No results found for: BLOODCX  No results found for: URINECX  No results found for: RESPCX  No results found for: WOUNDCX  No results found for: STOOLCX  No components found for: BODYFLD    Radiology Data:   Imaging Results (Last 24 Hours)     Procedure Component Value Units Date/Time    US Guided Vascular Access [[id number]] Collected: 05/22/23 0812     Updated: 05/22/23 0815    Narrative:      Ultrasound guidance vascular    FINDINGS:  Real-time ultrasound imaging was provided for vascular access.  Please refer to  procedure note for real-time exam findings.  The left basilicVein was found to  be patent and compressible.  Access under direct sonographic visualization.  Permanent saved images sent to the PACS for documentation.      IR Insert Midline Without Port Pump 5 Plus [[id number]] Resulted: 05/22/23 0802     Updated: 05/22/23 0802    Narrative:      This procedure was auto-finalized with no dictation required.          I have utilized all available immediate resources to obtain, update, or review the patient's current medications (including all prescriptions, over-the-counter products, herbals, cannabis/cannabidiol products, and vitamin/mineral/dietary (nutritional) supplements).       Assessment/Plan     Active Hospital Problems    Diagnosis    • **Sepsis without acute organ dysfunction, due to unspecified organism        Plan:  1.  Right lower extremity cellulitis: Improving.  Continue empiric antibiotics for now.  2.  Urinary retention: Patient had multiple episodes of bladder scanning with greater than 700 cc of urine requiring in and out caths.  Jose catheter has been reanchored and urology has been consulted.  3.  Hypertension: Well-controlled.  4.  Coronary artery disease: Continue home medication.  5.  Diabetes mellitus: All glucose readings are in the 200 range.  Levemir increased yesterday.  Currently on 45 units of Levemir at night.  At home on 40 units of Basaglar at night and  35 units in the morning.  Will add morning Levemir.  6.  DVT prophylaxis: Heparin.      Medical Decision Making  Number and Complexity of problems: Several highly complex medical problems    Conditions and Status:        Condition is improving.       Discussed with: Patient and      Treatment Plan  As above    Care Planning  Shared decision making: Patient in agreement with plan of care  Code status and discussions: Full code    Disposition  Social Determinants of Health that impact treatment or disposition: None  I expect the patient to be discharged to home in 1-2 days.       The patient was evaluated during the global COVID-19 pandemic, and the diagnosis was suspected/considered upon their initial presentation.  Evaluation, treatment, and testing were consistent with current guidelines for patients who present with complaints or symptoms that may be related to COVID-19.    I confirmed that the patient's Advance Care Plan is present, code status is documented, or surrogate decision maker is listed in the patient's medical record.        This document has been electronically signed by Mahin Esteves MD on May 22, 2023 14:14 CDT

## 2023-05-22 NOTE — NURSING NOTE
Pt was responding well to bladder training, able to notify staff to [facility]. This RN dc'd johns per nursing protocol for pt to attempt to void on her own. Since removal pt does feel urge to go but is unable to do so. She has been in and out so far 2 times this shift, both returned large amounts one 1125 and the other 700.    No complaints of leg pain this shift, just a headache and tylenol given x 1.       MD called to notified of continued retention, instructed to in and out x 1 more time no matter amount. If retention continues after that notify MD to see if they want johns replaced. RN did also notice pt has yeast infection, diflucan ordered x 1.

## 2023-05-22 NOTE — PLAN OF CARE
Goal Outcome Evaluation: Pt tolerating care without any difficulty. Pt sat on side of bed twice this shift. Jose patent and draining without difficulty. Pt required prn pain and anxiety med once this shift. Pt voices no other complaints or concerns.

## 2023-05-23 LAB
ANION GAP SERPL CALCULATED.3IONS-SCNC: 9 MMOL/L (ref 5–15)
BASOPHILS # BLD AUTO: 0.03 10*3/MM3 (ref 0–0.2)
BASOPHILS NFR BLD AUTO: 0.2 % (ref 0–1.5)
BUN SERPL-MCNC: 10 MG/DL (ref 8–23)
BUN/CREAT SERPL: 11.4 (ref 7–25)
CALCIUM SPEC-SCNC: 9.7 MG/DL (ref 8.6–10.5)
CHLORIDE SERPL-SCNC: 96 MMOL/L (ref 98–107)
CO2 SERPL-SCNC: 34 MMOL/L (ref 22–29)
CREAT SERPL-MCNC: 0.88 MG/DL (ref 0.57–1)
DEPRECATED RDW RBC AUTO: 41.6 FL (ref 37–54)
EGFRCR SERPLBLD CKD-EPI 2021: 74.9 ML/MIN/1.73
EOSINOPHIL # BLD AUTO: 0.38 10*3/MM3 (ref 0–0.4)
EOSINOPHIL NFR BLD AUTO: 2.6 % (ref 0.3–6.2)
ERYTHROCYTE [DISTWIDTH] IN BLOOD BY AUTOMATED COUNT: 13.3 % (ref 12.3–15.4)
GLUCOSE BLDC GLUCOMTR-MCNC: 135 MG/DL (ref 70–130)
GLUCOSE BLDC GLUCOMTR-MCNC: 165 MG/DL (ref 70–130)
GLUCOSE BLDC GLUCOMTR-MCNC: 174 MG/DL (ref 70–130)
GLUCOSE SERPL-MCNC: 156 MG/DL (ref 65–99)
HCT VFR BLD AUTO: 35.3 % (ref 34–46.6)
HGB BLD-MCNC: 11.7 G/DL (ref 12–15.9)
IMM GRANULOCYTES # BLD AUTO: 0.83 10*3/MM3 (ref 0–0.05)
IMM GRANULOCYTES NFR BLD AUTO: 5.7 % (ref 0–0.5)
LYMPHOCYTES # BLD AUTO: 2.29 10*3/MM3 (ref 0.7–3.1)
LYMPHOCYTES NFR BLD AUTO: 15.7 % (ref 19.6–45.3)
MAGNESIUM SERPL-MCNC: 1.6 MG/DL (ref 1.6–2.4)
MAGNESIUM SERPL-MCNC: 1.7 MG/DL (ref 1.6–2.4)
MCH RBC QN AUTO: 28.4 PG (ref 26.6–33)
MCHC RBC AUTO-ENTMCNC: 33.1 G/DL (ref 31.5–35.7)
MCV RBC AUTO: 85.7 FL (ref 79–97)
MONOCYTES # BLD AUTO: 0.96 10*3/MM3 (ref 0.1–0.9)
MONOCYTES NFR BLD AUTO: 6.6 % (ref 5–12)
NEUTROPHILS NFR BLD AUTO: 10.07 10*3/MM3 (ref 1.7–7)
NEUTROPHILS NFR BLD AUTO: 69.2 % (ref 42.7–76)
NRBC BLD AUTO-RTO: 0 /100 WBC (ref 0–0.2)
PLATELET # BLD AUTO: 506 10*3/MM3 (ref 140–450)
PMV BLD AUTO: 9.3 FL (ref 6–12)
POTASSIUM SERPL-SCNC: 3.4 MMOL/L (ref 3.5–5.2)
POTASSIUM SERPL-SCNC: 3.6 MMOL/L (ref 3.5–5.2)
POTASSIUM SERPL-SCNC: 3.8 MMOL/L (ref 3.5–5.2)
RBC # BLD AUTO: 4.12 10*6/MM3 (ref 3.77–5.28)
SODIUM SERPL-SCNC: 139 MMOL/L (ref 136–145)
WBC NRBC COR # BLD: 14.56 10*3/MM3 (ref 3.4–10.8)

## 2023-05-23 PROCEDURE — 97110 THERAPEUTIC EXERCISES: CPT

## 2023-05-23 PROCEDURE — 83735 ASSAY OF MAGNESIUM: CPT | Performed by: HOSPITALIST

## 2023-05-23 PROCEDURE — 80048 BASIC METABOLIC PNL TOTAL CA: CPT | Performed by: INTERNAL MEDICINE

## 2023-05-23 PROCEDURE — 82948 REAGENT STRIP/BLOOD GLUCOSE: CPT

## 2023-05-23 PROCEDURE — 84132 ASSAY OF SERUM POTASSIUM: CPT | Performed by: INTERNAL MEDICINE

## 2023-05-23 PROCEDURE — 85025 COMPLETE CBC W/AUTO DIFF WBC: CPT | Performed by: HOSPITALIST

## 2023-05-23 PROCEDURE — 63710000001 INSULIN ASPART PER 5 UNITS: Performed by: HOSPITALIST

## 2023-05-23 PROCEDURE — 97535 SELF CARE MNGMENT TRAINING: CPT

## 2023-05-23 PROCEDURE — 63710000001 INSULIN DETEMIR PER 5 UNITS: Performed by: HOSPITALIST

## 2023-05-23 PROCEDURE — 25010000002 HEPARIN (PORCINE) PER 1000 UNITS: Performed by: HOSPITALIST

## 2023-05-23 PROCEDURE — 84132 ASSAY OF SERUM POTASSIUM: CPT | Performed by: HOSPITALIST

## 2023-05-23 PROCEDURE — 83735 ASSAY OF MAGNESIUM: CPT | Performed by: INTERNAL MEDICINE

## 2023-05-23 PROCEDURE — 97530 THERAPEUTIC ACTIVITIES: CPT

## 2023-05-23 PROCEDURE — 25010000002 VANCOMYCIN 10 G RECONSTITUTED SOLUTION: Performed by: HOSPITALIST

## 2023-05-23 PROCEDURE — 0 CEFEPIME PER 500 MG: Performed by: INTERNAL MEDICINE

## 2023-05-23 RX ORDER — POTASSIUM CHLORIDE 1500 MG/1
40 TABLET, FILM COATED, EXTENDED RELEASE ORAL EVERY 4 HOURS
Status: COMPLETED | OUTPATIENT
Start: 2023-05-23 | End: 2023-05-23

## 2023-05-23 RX ORDER — POTASSIUM CHLORIDE 20 MEQ/1
40 TABLET, EXTENDED RELEASE ORAL EVERY 4 HOURS
Status: ACTIVE | OUTPATIENT
Start: 2023-05-23 | End: 2023-05-24

## 2023-05-23 RX ADMIN — FOLIC ACID 1000 MCG: 1 TABLET ORAL at 09:02

## 2023-05-23 RX ADMIN — TAMSULOSIN HYDROCHLORIDE 0.4 MG: 0.4 CAPSULE ORAL at 09:01

## 2023-05-23 RX ADMIN — ACETAMINOPHEN 650 MG: 325 TABLET, FILM COATED ORAL at 06:09

## 2023-05-23 RX ADMIN — HEPARIN SODIUM 5000 UNITS: 5000 INJECTION INTRAVENOUS; SUBCUTANEOUS at 20:03

## 2023-05-23 RX ADMIN — SUCRALFATE 1 G: 1 TABLET ORAL at 11:12

## 2023-05-23 RX ADMIN — AMLODIPINE BESYLATE 5 MG: 5 TABLET ORAL at 09:02

## 2023-05-23 RX ADMIN — SUCRALFATE 1 G: 1 TABLET ORAL at 20:02

## 2023-05-23 RX ADMIN — ISOSORBIDE MONONITRATE 30 MG: 30 TABLET, EXTENDED RELEASE ORAL at 09:02

## 2023-05-23 RX ADMIN — SUCRALFATE 1 G: 1 TABLET ORAL at 09:02

## 2023-05-23 RX ADMIN — VANCOMYCIN HYDROCHLORIDE 1750 MG: 10 INJECTION, POWDER, LYOPHILIZED, FOR SOLUTION INTRAVENOUS at 11:12

## 2023-05-23 RX ADMIN — CEFEPIME 2 G: 2 INJECTION, POWDER, FOR SOLUTION INTRAVENOUS at 13:42

## 2023-05-23 RX ADMIN — HEPARIN SODIUM 5000 UNITS: 5000 INJECTION INTRAVENOUS; SUBCUTANEOUS at 06:07

## 2023-05-23 RX ADMIN — PANTOPRAZOLE SODIUM 40 MG: 40 TABLET, DELAYED RELEASE ORAL at 09:02

## 2023-05-23 RX ADMIN — Medication 10 ML: at 20:03

## 2023-05-23 RX ADMIN — SUCRALFATE 1 G: 1 TABLET ORAL at 17:25

## 2023-05-23 RX ADMIN — POTASSIUM CHLORIDE 40 MEQ: 1500 TABLET, FILM COATED, EXTENDED RELEASE ORAL at 10:00

## 2023-05-23 RX ADMIN — Medication 10 ML: at 09:00

## 2023-05-23 RX ADMIN — LEVOTHYROXINE SODIUM 50 MCG: 50 TABLET ORAL at 06:07

## 2023-05-23 RX ADMIN — HYDROCHLOROTHIAZIDE 12.5 MG: 12.5 TABLET ORAL at 09:01

## 2023-05-23 RX ADMIN — DOCUSATE SODIUM 50 MG AND SENNOSIDES 8.6 MG 2 TABLET: 8.6; 5 TABLET, FILM COATED ORAL at 09:01

## 2023-05-23 RX ADMIN — GABAPENTIN 200 MG: 100 CAPSULE ORAL at 20:02

## 2023-05-23 RX ADMIN — GABAPENTIN 200 MG: 100 CAPSULE ORAL at 09:02

## 2023-05-23 RX ADMIN — FUROSEMIDE 40 MG: 40 TABLET ORAL at 09:02

## 2023-05-23 RX ADMIN — HYDROCODONE BITARTRATE AND ACETAMINOPHEN 1 TABLET: 7.5; 325 TABLET ORAL at 09:03

## 2023-05-23 RX ADMIN — CEFEPIME 2 G: 2 INJECTION, POWDER, FOR SOLUTION INTRAVENOUS at 04:41

## 2023-05-23 RX ADMIN — BISACODYL 5 MG: 5 TABLET, COATED ORAL at 13:47

## 2023-05-23 RX ADMIN — INSULIN DETEMIR 20 UNITS: 100 INJECTION, SOLUTION SUBCUTANEOUS at 10:02

## 2023-05-23 RX ADMIN — INSULIN ASPART 3 UNITS: 100 INJECTION, SOLUTION INTRAVENOUS; SUBCUTANEOUS at 11:12

## 2023-05-23 RX ADMIN — VENLAFAXINE HYDROCHLORIDE 75 MG: 75 CAPSULE, EXTENDED RELEASE ORAL at 09:02

## 2023-05-23 RX ADMIN — ATORVASTATIN CALCIUM 80 MG: 40 TABLET, FILM COATED ORAL at 09:02

## 2023-05-23 RX ADMIN — RANOLAZINE 500 MG: 500 TABLET, FILM COATED, EXTENDED RELEASE ORAL at 09:02

## 2023-05-23 RX ADMIN — INSULIN ASPART 3 UNITS: 100 INJECTION, SOLUTION INTRAVENOUS; SUBCUTANEOUS at 09:01

## 2023-05-23 RX ADMIN — CEFEPIME 2 G: 2 INJECTION, POWDER, FOR SOLUTION INTRAVENOUS at 20:03

## 2023-05-23 RX ADMIN — POTASSIUM CHLORIDE 40 MEQ: 1500 TABLET, FILM COATED, EXTENDED RELEASE ORAL at 13:48

## 2023-05-23 RX ADMIN — LORAZEPAM 0.5 MG: 0.5 TABLET ORAL at 11:12

## 2023-05-23 RX ADMIN — ASPIRIN 325 MG: 325 TABLET, COATED ORAL at 09:02

## 2023-05-23 RX ADMIN — ARIPIPRAZOLE 5 MG: 5 TABLET ORAL at 10:00

## 2023-05-23 RX ADMIN — LORAZEPAM 0.5 MG: 0.5 TABLET ORAL at 01:55

## 2023-05-23 RX ADMIN — CLOPIDOGREL BISULFATE 75 MG: 75 TABLET ORAL at 09:02

## 2023-05-23 RX ADMIN — RANOLAZINE 500 MG: 500 TABLET, FILM COATED, EXTENDED RELEASE ORAL at 20:02

## 2023-05-23 RX ADMIN — DOCUSATE SODIUM 50 MG AND SENNOSIDES 8.6 MG 2 TABLET: 8.6; 5 TABLET, FILM COATED ORAL at 20:02

## 2023-05-23 RX ADMIN — HEPARIN SODIUM 5000 UNITS: 5000 INJECTION INTRAVENOUS; SUBCUTANEOUS at 13:46

## 2023-05-23 NOTE — THERAPY TREATMENT NOTE
Patient Name: Ariana Martinez  : 1960    MRN: [id number]                              Today's Date: 2023       Admit Date: 2023    Visit Dx:     ICD-10-CM ICD-9-CM   1. Sepsis without acute organ dysfunction, due to unspecified organism  A41.9 038.9     995.91   2. Cellulitis of right lower extremity  L03.115 682.6   3. Type 2 diabetes mellitus with hyperglycemia, unspecified whether long term insulin use  E11.65 250.00   4. Impaired mobility and activities of daily living  Z74.09 V49.89    Z78.9    5. Impaired physical mobility  Z74.09 781.99   6. Impaired mobility and ADLs  Z74.09 V49.89    Z78.9      Patient Active Problem List   Diagnosis   • 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, with long-term current use of insulin   • Closed nondisplaced fracture of fifth left metatarsal bone   • MAURICIO (generalized anxiety disorder)   • Depression, major, recurrent, moderate (HCC)   • GERD without esophagitis   • Long term prescription opiate use   • Mixed hyperlipidemia   • Vitamin D deficiency   • Seasonal allergic rhinitis   • Restrictive lung disease secondary to obesity   • Adult body mass index 37.0-37.9   • Snoring   • Class 3 severe obesity due to excess calories without serious comorbidity with body mass index (BMI) of 40.0 to 44.9 in adult (HCC)   • (HFpEF) heart failure with preserved ejection fraction   • Type 2 diabetes mellitus with hyperglycemia, with long-term current use of insulin (HCC)   • Cyanocobalamin deficiency   • Coronary artery disease of native artery of native heart with stable angina pectoris   • Hypertension   • Meniere's disease   • Gastroparesis   • Pulmonary hypertension (HCC)   • Pes cavus   • Primary osteoarthritis involving multiple joints   • Generalized anxiety disorder   • Chronic right-sided low back pain with right-sided sciatica   • Chest pain   • Adverse effect of iron   • Chest pain due to myocardial ischemia   • Nonrheumatic tricuspid  valve regurgitation   • Iron deficiency anemia due to chronic blood loss   • Malaise and fatigue   • Ankle arthritis   • Urinary retention   • Endocarditis   • Essential hypertension   • Occlusion and stenosis of bilateral carotid arteries   • S/P BKA (below knee amputation) unilateral, left (HCC)   • Phantom pain after amputation of lower extremity   • S/P CABG (coronary artery bypass graft)   • Severe malnutrition   • TIA (transient ischemic attack)   • Coronary artery abnormality   • Elevated d-dimer   • Neuropathy   • Chest pain, unspecified type   • Acute pain of right shoulder   • Rotator cuff syndrome, right   • Acquired hypothyroidism   • Bilateral leg edema   • Left elbow pain   • Gastritis   • Olecranon bursitis, left elbow   • Sepsis without acute organ dysfunction, due to unspecified organism     Past Medical History:   Diagnosis Date   • Acute bacterial endocarditis 3/13/2021   • Angina, class IV    • Anxiety    • Anxiety and depression    • Arthritis    • Benign paroxysmal positional vertigo    • Bladder disorder     has bladder stimulator   • Carpal tunnel syndrome    • CHF (congestive heart failure)    • Chronic pain    • Coronary atherosclerosis     hx CABG 2005.  is followed by Dr Kwon   • Depression    • Diabetes mellitus     Type 2, controlled   • Diabetic polyneuropathy    • Disease of thyroid gland    • Elevated cholesterol    • Female stress incontinence    • Foot pain, left    • Full dentures    • Gastroparesis    • GERD (gastroesophageal reflux disease)    • Hyperlipidemia    • Hypertension    • Low back pain    • Malaise and fatigue    • Multiple joint pain    • Obesity     Refuses to be weighed   • Occlusion and stenosis of bilateral carotid arteries 6/18/2021   • Otalgia     Both   • Perforation of tympanic membrane     Left   • Postoperative wound infection    • Vitamin D deficiency    • Wears glasses     reading     Past Surgical History:   Procedure Laterality Date   • ABDOMINAL  SURGERY     • AMPUTATION FOOT     • ANGIOPLASTY      coronary   • ANKLE ARTHROSCOPY Left 02/26/2021    Procedure: Left foot hardware removal, ankle arthroscopy, bone grafting , left foot exostectomy;  Surgeon: Ignacio Lord DPM;  Location: [facility] OR;  Service: Podiatry;  Laterality: Left;   • BREAST BIOPSY Right    • CARDIAC CATHETERIZATION     • CARDIAC CATHETERIZATION N/A 06/20/2017    Procedure: Right Heart Cath;  Surgeon: Can Kwon MD PhD;  Location: [facility] system CATH INVASIVE LOCATION;  Service:    • CARDIAC CATHETERIZATION N/A 02/18/2020    Procedure: Left Heart Cath;  Surgeon: Catalina Cooper MD;  Location: [facility] system CATH INVASIVE LOCATION;  Service: Cardiology;  Laterality: N/A;   • CARDIAC CATHETERIZATION N/A 04/06/2022    Procedure: Left Heart Cath;  Surgeon: Sheryl Navas MD;  Location: [facility] system CATH INVASIVE LOCATION;  Service: Cardiology;  Laterality: N/A;   • CARPAL TUNNEL RELEASE     • CHOLECYSTECTOMY     • COLONOSCOPY N/A 06/24/2020    Procedure: COLONOSCOPY;  Surgeon: Julián Maldonado MD;  Location: [facility] system ENDOSCOPY;  Service: Gastroenterology;  Laterality: N/A;   • CORONARY ARTERY BYPASS GRAFT  2005   • ENDOSCOPY N/A 10/19/2018    Procedure: ESOPHAGOGASTRODUODENOSCOPY possible dilation;  Surgeon: Julián Maldonado MD;  Location: [facility] system ENDOSCOPY;  Service: Gastroenterology   • ENDOSCOPY N/A 06/24/2020    Procedure: ESOPHAGOGASTRODUODENOSCOPY WED appt please;  Surgeon: Julián Maldonado MD;  Location: Health system ENDOSCOPY;  Service: Gastroenterology;  Laterality: N/A;   • ENDOSCOPY N/A 06/10/2022    Procedure: ESOPHAGOGASTRODUODENOSCOPY   room 380;  Surgeon: Jeremiah Wilkins MD;  Location: [facility] system ENDOSCOPY;  Service: Gastroenterology;  Laterality: N/A;   • ENDOSCOPY N/A 1/10/2023    Procedure: ESOPHAGOGASTRODUODENOSCOPY;  Surgeon: Jeremiah Wilkins MD;  Location: Health system ENDOSCOPY;  Service: Gastroenterology;  Laterality: N/A;   • ENDOSCOPY AND COLONOSCOPY     • FOOT SURGERY      Toes   •  FOOT SURGERY     • GASTRIC BANDING      Revision, laparoscopic   • HYSTERECTOMY     • INCISION AND DRAINAGE LEG Left 03/12/2021    Procedure: Left ankle arthroscopic irrigation and debridement, screw removal;  Surgeon: Ignacio Lord DPM;  Location: [facility];  Service: Podiatry;  Laterality: Left;   • MOUTH SURGERY     • SALPINGO OOPHORECTOMY     • SHOULDER SURGERY     • SUBTALAR ARTHRODESIS Left 01/16/2019    Procedure: LEFT FOOT HARDWARE REMOVAL, FIFTH METATARSAL , OPEN REDUCTION INTERNAL FIXATION, CALCANEAL OSTEOTOMY;  Surgeon: Ignacio Lord DPM;  Location: [facility];  Service: Podiatry   • SUBTALAR ARTHRODESIS Left 10/16/2019    Procedure: foot hardware removal, subtalar joint fusion  possible de/reattachment of achilles tendon        (c-arm);  Surgeon: Ignacio Lord DPM;  Location: [facility];  Service: Podiatry   • SUBTALAR ARTHRODESIS Left 09/30/2020    Procedure: subtalar, talonavicular joint arthrodesis.  Removal hardware.          (c-arm);  Surgeon: Ignacio Lord DPM;  Location: [facility];  Service: Podiatry;  Laterality: Left;   • TRANSESOPHAGEAL ECHOCARDIOGRAM (LAMONTE)      With color flow      General Information     Row Name 05/23/23 [facility]          OT Time and Intention    Document Type therapy note (daily note)  -     Mode of Treatment occupational therapy  -     Row Name 05/23/23 0821          General Information    Patient Profile Reviewed yes  -     Existing Precautions/Restrictions fall  -     Row Name 05/23/23 0821          Living Environment    People in Home spouse  -     Row Name 05/23/23 0821          Cognition    Orientation Status (Cognition) oriented x 4  -     Row Name 05/23/23 0821          Safety Issues, Functional Mobility    Impairments Affecting Function (Mobility) endurance/activity tolerance;strength;pain  -           User Key  (r) = Recorded By, (t) = Taken By, (c) = Cosigned By    Initials Name Provider Type     Francisco Randall OT Occupational  Therapist                 Mobility/ADL's     Row Name 05/23/23 0821          Bed Mobility    Supine-Sit [place] (Bed Mobility) modified independence  -     Sit-Supine [place] (Bed Mobility) modified independence  -     Row Name 05/23/23 0821          Transfers    Transfers sit-stand transfer  -[facility] Name 05/23/23 0821          Sit-Stand Transfer    Sit-Stand [place] (Transfers) contact guard  -     Assistive Device (Sit-Stand Transfers) walker, front-wheeled  -     Row Name 05/23/23 0821          Activities of Daily Living    BADL Assessment/Intervention grooming;lower body dressing  -[facility] Name 05/23/23 0821          Grooming Assessment/Training    [place] Level (Grooming) set up;oral care regimen;wash face, hands  -     Position (Grooming) edge of bed sitting  -[facility] Name 05/23/23 0821          Lower Body Dressing Assessment/Training    [place] Level (Lower Body Dressing) lower body dressing skills;don;socks;dependent (less than 25% patient effort)  -           User Key  (r) = Recorded By, (t) = Taken By, (c) = Cosigned By    Initials Name Provider Type     Francisco Randall, OT Occupational Therapist               Obj/Interventions     [facility] Name 05/23/23 0821          Shoulder (Therapeutic Exercise)    Shoulder (Therapeutic Exercise) strengthening exercise  -     Shoulder Strengthening (Therapeutic Exercise) bilateral;flexion;extension;horizontal aBduction/aDduction;10 repetitions;3 sets;3 lb free weight  -[facility] Name 05/23/23 0821          Elbow/Forearm (Therapeutic Exercise)    Elbow/Forearm (Therapeutic Exercise) strengthening exercise  -     Elbow/Forearm Strengthening (Therapeutic Exercise) 3 lb free weight;bilateral;flexion;extension;3 sets;10 repetitions  -[facility] Name 05/23/23 0821          Wrist (Therapeutic Exercise)    Wrist (Therapeutic Exercise) AROM (active range of motion)  -     Wrist AROM (Therapeutic Exercise)  bilateral;flexion;extension;3 sets;10 repetitions  -[facility] Name 05/23/23 0821          Hand (Therapeutic Exercise)    Hand (Therapeutic Exercise) AROM (active range of motion)  -     Hand AROM/AAROM (Therapeutic Exercise) bilateral;finger flexion;finger extension;10 repetitions;3 sets  -[facility] Name 05/23/23 0821          Motor Skills    Therapeutic Exercise shoulder;elbow/forearm;wrist;hand  -           User Key  (r) = Recorded By, (t) = Taken By, (c) = Cosigned By    Initials Name Provider Type     Francisco Randall, OT Occupational Therapist               Goals/Plan     [facility] Name 05/23/23 0821          Transfer Goal 1 (OT)    Activity/Assistive Device (Transfer Goal 1, OT) sit-to-stand/stand-to-sit;toilet;wheelchair transfer;bed-to-chair/chair-to-bed  -     [place] Level/Cues Needed (Transfer Goal 1, OT) contact guard required  -SJ     Time Frame (Transfer Goal 1, OT) long term goal (LTG)  -[facility] Name 05/23/23 0821          Bathing Goal 1 (OT)    Activity/Device (Bathing Goal 1, OT) bathing skills, all  -SJ     [place] Level/Cues Needed (Bathing Goal 1, OT) contact guard required  -SJ     Time Frame (Bathing Goal 1, OT) long term goal (LTG)  -SJ     Progress/Outcomes (Bathing Goal 1, OT) goal not met  -[facility] Name 05/23/23 0821          Dressing Goal 1 (OT)    Activity/Device (Dressing Goal 1, OT) dressing skills, all  -SJ     [place]/Cues Needed (Dressing Goal 1, OT) contact guard required  -SJ     Time Frame (Dressing Goal 1, OT) long term goal (LTG)  -SJ     Progress/Outcome (Dressing Goal 1, OT) goal not met  -[facility] Name 05/23/23 0821          Toileting Goal 1 (OT)    Activity/Device (Toileting Goal 1, OT) toileting skills, all  -SJ     [place] Level/Cues Needed (Toileting Goal 1, OT) minimum assist (75% or more patient effort)  -SJ     Time Frame (Toileting Goal 1, OT) long term goal (LTG)  -SJ     Progress/Outcome (Toileting Goal 1, OT) goal not met  -      Row Name 05/23/23 0821          Self-Feeding Goal 1 (OT)    Activity/Device (Self-Feeding Goal 1, OT) self-feeding skills, all  -     Day Level/Cues Needed (Self-Feeding Goal 1, OT) modified independence  -     Time Frame (Self-Feeding Goal 1, OT) long term goal (LTG)  -     Progress/Outcomes (Self-Feeding Goal 1, OT) goal met   -           User Key  (r) = Recorded By, (t) = Taken By, (c) = Cosigned By    Initials Name Provider Type     Francisco Randall, OT Occupational Therapist               Clinical Impression     Row Name 05/23/23 0821          Pain Assessment    Pretreatment Pain Rating 7/10  -SJ     Posttreatment Pain Rating 8/10  -SJ     Pain Location - Side/Orientation Bilateral  -     Pain Location lower  -     Pain Location - extremity;chest  -     Pre/Posttreatment Pain Comment reports pressure in her chest, RN notified and to provide pain medication. Chest pressure did not increase throughout sesion.  -     Pain Intervention(s) Medication (See MAR);Repositioned  -     Row Name 05/23/23 0821          Plan of Care Review    Plan of Care Reviewed With patient  -SJ     Outcome Evaluation OT tx complete, patient alert x 4, very cooperative. Supine<>sit with modified independence. Grooming performed EOB with set up. 20 mins of EOB tolerated this session. 3 sets x 10 reps of BUE exercises 3 lbs dowel. Patient agreeable for standing trial. x1 stand wiht CGA and RW. Patient tolerated 40 seconds of static standing, on RLE (no prosthetic present for LLE). Patient encouraged to sit in chair, but deferring at this time, but patient presents as she is ready for transfers at this time, will progress as patient allows. Returned to bed, all needs in reach. Patient progressing well. 1 goal met this date.  -     Row Name 05/23/23 0821          Therapy Assessment/Plan (OT)    Patient/Family Therapy Goal Statement (OT) return home  -     Rehab Potential (OT) good, to achieve stated therapy  goals  -     Criteria for Skilled Therapeutic Interventions Met (OT) yes;skilled treatment is necessary  -     Row Name 05/23/23 0821          Therapy Plan Review/Discharge Plan (OT)    Anticipated Discharge Disposition (OT) home with assist;home with 24/7 care  -     Row Name 05/23/23 0821          Vital Signs    Pre Systolic BP Rehab 111  -SJ     Pre Treatment Diastolic BP 56  -SJ     Intra Systolic BP Rehab 114  -SJ     Intra Treatment Diastolic BP 84  -SJ     Pretreatment Heart Rate (beats/min) 67  -SJ     Intratreatment Heart Rate (beats/min) 72  -SJ     Pre SpO2 (%) 99  -SJ     O2 Delivery Pre Treatment nasal cannula  4 lpm  -     Intra SpO2 (%) 99  -SJ     O2 Delivery Intra Treatment nasal cannula  -     Pre Patient Position Supine  -     Intra Patient Position Sitting  -[facility] Name 05/23/23 0821          Positioning and Restraints    Pre-Treatment Position in bed  -SJ     Post Treatment Position bed  -SJ     In Bed call light within reach;notified nsg;supine;encouraged to call for assist;exit alarm on;side rails up x2;with nsg  -           User Key  (r) = Recorded By, (t) = Taken By, (c) = Cosigned By    Initials Name Provider Type     Francisco Randall, OT Occupational Therapist               Outcome Measures     Row Name 05/23/23 0821          How much help from another is currently needed...    Putting on and taking off regular lower body clothing? 2  -SJ     Bathing (including washing, rinsing, and drying) 3  -SJ     Toileting (which includes using toilet bed pan or urinal) 3  -SJ     Putting on and taking off regular upper body clothing 3  -SJ     Taking care of personal grooming (such as brushing teeth) 4  -SJ     Eating meals 4  -SJ     AM-PAC 6 Clicks Score (OT) 19  -     Row Name 05/23/23 0821          Functional Assessment    Outcome Measure Options AM-PAC 6 Clicks Daily Activity (OT)  -           User Key  (r) = Recorded By, (t) = Taken By, (c) = Cosigned By    Initials Name  Provider Type    Francisco Camarena OT Occupational Therapist                Occupational Therapy Education     Title: PT OT SLP Therapies (In Progress)     Topic: Occupational Therapy (Done)     Point: ADL training (Done)     Description:   Instruct learner(s) on proper safety adaptation and remediation techniques during self care or transfers.   Instruct in proper use of assistive devices.              Learning Progress Summary           Patient Acceptance, E,TB, VU by RW at 5/22/2023 1243    Comment: POC, Role of OT    Acceptance, E,TB, VU by BB at 5/20/2023 1358    Acceptance, E,TB, VU by BB at 5/20/2023 1357                   Point: Home exercise program (Done)     Description:   Instruct learner(s) on appropriate technique for monitoring, assisting and/or progressing therapeutic exercises/activities.              Learning Progress Summary           Patient Acceptance, E,TB, VU by BB at 5/20/2023 1356                   Point: Precautions (Done)     Description:   Instruct learner(s) on prescribed precautions during self-care and functional transfers.              Learning Progress Summary           Patient Acceptance, E,TB, VU by RW at 5/22/2023 1243    Comment: POC, Role of OT    Acceptance, E, DU by RB at 5/18/2023 1352    Comment: Pt edu on use of gait belt and non skid socks when OOB and no OOB without assist.                   Point: Body mechanics (Done)     Description:   Instruct learner(s) on proper positioning and spine alignment during self-care, functional mobility activities and/or exercises.              Learning Progress Summary           Patient Acceptance, E,TB, VU by RW at 5/22/2023 1243    Comment: POC, Role of OT    Acceptance, E,TB, VU by BB at 5/20/2023 1358    Acceptance, E,TB, VU by BB at 5/20/2023 1357                               User Key     Initials Effective Dates Name Provider Type Discipline    RB 06/16/21 -  Fredi France OT Occupational Therapist OT    BB 06/16/21 -   Matilde Rios COTA Occupational Therapist Assistant OT    RW 09/22/22 -  Missy Esteves OT Occupational Therapist OT              OT Recommendation and Plan     Plan of Care Review  Plan of Care Reviewed With: patient  Outcome Evaluation: OT tx complete, patient alert x 4, very cooperative. Supine<>sit with modified independence. Grooming performed EOB with set up. 20 mins of EOB tolerated this session. 3 sets x 10 reps of BUE exercises 3 lbs dowel. Patient agreeable for standing trial. x1 stand wiht CGA and RW. Patient tolerated 40 seconds of static standing, on RLE (no prosthetic present for LLE). Patient encouraged to sit in chair, but deferring at this time, but patient presents as she is ready for transfers at this time, will progress as patient allows. Returned to bed, all needs in reach. Patient progressing well. 1 goal met this date.     Time Calculation:    Time Calculation- OT     Row Name 05/23/23 0918             Time Calculation- OT    OT Start Time 0821  -      OT Stop Time 0916  -      OT Time Calculation (min) 55 min  -      Total Timed Code Minutes- OT 55 minute(s)  -      OT Received On 05/23/23  -         Timed Charges    73204 - OT Therapeutic Exercise Minutes 25  -SJ      48558 - OT Therapeutic Activity Minutes 15  -SJ      30272 - OT Self Care/Mgmt Minutes 15  -SJ         Total Minutes    Timed Charges Total Minutes 55  -SJ       Total Minutes 55  -SJ            User Key  (r) = Recorded By, (t) = Taken By, (c) = Cosigned By    Initials Name Provider Type     Francisco Randall, OT Occupational Therapist              Therapy Charges for Today     Code Description Service Date Service Provider Modifiers Qty    08875187917 HC OT THER PROC EA 15 MIN 5/23/2023 Francisco Randall OT GO 2    33009214576 HC OT THERAPEUTIC ACT EA 15 MIN 5/23/2023 Francisco Randall OT GO 1    17834912976 HC OT SELF CARE/MGMT/TRAIN EA 15 MIN 5/23/2023 Francisco Randall OT GO 1               Francisco STALLWORTH  Prakash, OT  5/23/2023

## 2023-05-23 NOTE — THERAPY TREATMENT NOTE
Acute Care - Physical Therapy Treatment Note  [facility]     Patient Name: Ariana Martinez  : 1960  MRN: [id number]  Today's Date: 2023      Visit Dx:     ICD-10-CM ICD-9-CM   1. Sepsis without acute organ dysfunction, due to unspecified organism  A41.9 038.9     995.91   2. Cellulitis of right lower extremity  L03.115 682.6   3. Type 2 diabetes mellitus with hyperglycemia, unspecified whether long term insulin use  E11.65 250.00   4. Impaired mobility and activities of daily living  Z74.09 V49.89    Z78.9    5. Impaired physical mobility  Z74.09 781.99   6. Impaired mobility and ADLs  Z74.09 V49.89    Z78.9      Patient Active Problem List   Diagnosis   • 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, with long-term current use of insulin   • Closed nondisplaced fracture of fifth left metatarsal bone   • MAURICIO (generalized anxiety disorder)   • Depression, major, recurrent, moderate (HCC)   • GERD without esophagitis   • Long term prescription opiate use   • Mixed hyperlipidemia   • Vitamin D deficiency   • Seasonal allergic rhinitis   • Restrictive lung disease secondary to obesity   • Adult body mass index 37.0-37.9   • Snoring   • Class 3 severe obesity due to excess calories without serious comorbidity with body mass index (BMI) of 40.0 to 44.9 in adult (HCC)   • (HFpEF) heart failure with preserved ejection fraction   • Type 2 diabetes mellitus with hyperglycemia, with long-term current use of insulin ([facility])   • Cyanocobalamin deficiency   • Coronary artery disease of native artery of native heart with stable angina pectoris   • Hypertension   • Meniere's disease   • Gastroparesis   • Pulmonary hypertension (Former[facility])   • Pes cavus   • Primary osteoarthritis involving multiple joints   • Generalized anxiety disorder   • Chronic right-sided low back pain with right-sided sciatica   • Chest pain   • Adverse effect of iron   • Chest pain due to myocardial ischemia   • Nonrheumatic  tricuspid valve regurgitation   • Iron deficiency anemia due to chronic blood loss   • Malaise and fatigue   • Ankle arthritis   • Urinary retention   • Endocarditis   • Essential hypertension   • Occlusion and stenosis of bilateral carotid arteries   • S/P BKA (below knee amputation) unilateral, left (HCC)   • Phantom pain after amputation of lower extremity   • S/P CABG (coronary artery bypass graft)   • Severe malnutrition   • TIA (transient ischemic attack)   • Coronary artery abnormality   • Elevated d-dimer   • Neuropathy   • Chest pain, unspecified type   • Acute pain of right shoulder   • Rotator cuff syndrome, right   • Acquired hypothyroidism   • Bilateral leg edema   • Left elbow pain   • Gastritis   • Olecranon bursitis, left elbow   • Sepsis without acute organ dysfunction, due to unspecified organism     Past Medical History:   Diagnosis Date   • Acute bacterial endocarditis 3/13/2021   • Angina, class IV    • Anxiety    • Anxiety and depression    • Arthritis    • Benign paroxysmal positional vertigo    • Bladder disorder     has bladder stimulator   • Carpal tunnel syndrome    • CHF (congestive heart failure)    • Chronic pain    • Coronary atherosclerosis     hx CABG 2005.  is followed by Dr Kwon   • Depression    • Diabetes mellitus     Type 2, controlled   • Diabetic polyneuropathy    • Disease of thyroid gland    • Elevated cholesterol    • Female stress incontinence    • Foot pain, left    • Full dentures    • Gastroparesis    • GERD (gastroesophageal reflux disease)    • Hyperlipidemia    • Hypertension    • Low back pain    • Malaise and fatigue    • Multiple joint pain    • Obesity     Refuses to be weighed   • Occlusion and stenosis of bilateral carotid arteries 6/18/2021   • Otalgia     Both   • Perforation of tympanic membrane     Left   • Postoperative wound infection    • Vitamin D deficiency    • Wears glasses     reading     Past Surgical History:   Procedure Laterality Date   •  ABDOMINAL SURGERY     • AMPUTATION FOOT     • ANGIOPLASTY      coronary   • ANKLE ARTHROSCOPY Left 02/26/2021    Procedure: Left foot hardware removal, ankle arthroscopy, bone grafting , left foot exostectomy;  Surgeon: Ignacio Lord DPM;  Location: [facility] OR;  Service: Podiatry;  Laterality: Left;   • BREAST BIOPSY Right    • CARDIAC CATHETERIZATION     • CARDIAC CATHETERIZATION N/A 06/20/2017    Procedure: Right Heart Cath;  Surgeon: Can Kwon MD PhD;  Location: [facility] CATH INVASIVE LOCATION;  Service:    • CARDIAC CATHETERIZATION N/A 02/18/2020    Procedure: Left Heart Cath;  Surgeon: Catalina Cooper MD;  Location: [facility] CATH INVASIVE LOCATION;  Service: Cardiology;  Laterality: N/A;   • CARDIAC CATHETERIZATION N/A 04/06/2022    Procedure: Left Heart Cath;  Surgeon: Sheryl Navas MD;  Location: [facility] CATH INVASIVE LOCATION;  Service: Cardiology;  Laterality: N/A;   • CARPAL TUNNEL RELEASE     • CHOLECYSTECTOMY     • COLONOSCOPY N/A 06/24/2020    Procedure: COLONOSCOPY;  Surgeon: Julián Maldonado MD;  Location: [facility] ENDOSCOPY;  Service: Gastroenterology;  Laterality: N/A;   • CORONARY ARTERY BYPASS GRAFT  2005   • ENDOSCOPY N/A 10/19/2018    Procedure: ESOPHAGOGASTRODUODENOSCOPY possible dilation;  Surgeon: Julián Maldonado MD;  Location: [facility] ENDOSCOPY;  Service: Gastroenterology   • ENDOSCOPY N/A 06/24/2020    Procedure: ESOPHAGOGASTRODUODENOSCOPY WED appt please;  Surgeon: Julián Maldonado MD;  Location: [facility] ENDOSCOPY;  Service: Gastroenterology;  Laterality: N/A;   • ENDOSCOPY N/A 06/10/2022    Procedure: ESOPHAGOGASTRODUODENOSCOPY   room 380;  Surgeon: Jeremiah Wilkins MD;  Location: [facility] ENDOSCOPY;  Service: Gastroenterology;  Laterality: N/A;   • ENDOSCOPY N/A 1/10/2023    Procedure: ESOPHAGOGASTRODUODENOSCOPY;  Surgeon: Jeremiah Wilkins MD;  Location: [facility] ENDOSCOPY;  Service: Gastroenterology;  Laterality: N/A;   • ENDOSCOPY AND COLONOSCOPY     • FOOT SURGERY       Toes   • FOOT SURGERY     • GASTRIC BANDING      Revision, laparoscopic   • HYSTERECTOMY     • INCISION AND DRAINAGE LEG Left 03/12/2021    Procedure: Left ankle arthroscopic irrigation and debridement, screw removal;  Surgeon: Ignacio Lord DPM;  Location: [facility];  Service: Podiatry;  Laterality: Left;   • MOUTH SURGERY     • SALPINGO OOPHORECTOMY     • SHOULDER SURGERY     • SUBTALAR ARTHRODESIS Left 01/16/2019    Procedure: LEFT FOOT HARDWARE REMOVAL, FIFTH METATARSAL , OPEN REDUCTION INTERNAL FIXATION, CALCANEAL OSTEOTOMY;  Surgeon: Ignacio Lord DPM;  Location: [facility];  Service: Podiatry   • SUBTALAR ARTHRODESIS Left 10/16/2019    Procedure: foot hardware removal, subtalar joint fusion  possible de/reattachment of achilles tendon        (c-arm);  Surgeon: Ignacio Lord DPM;  Location: [facility];  Service: Podiatry   • SUBTALAR ARTHRODESIS Left 09/30/2020    Procedure: subtalar, talonavicular joint arthrodesis.  Removal hardware.          (c-arm);  Surgeon: Ignacio Lord DPM;  Location: [facility];  Service: Podiatry;  Laterality: Left;   • TRANSESOPHAGEAL ECHOCARDIOGRAM (LAMONTE)      With color flow     PT Assessment (last 12 hours)     PT Evaluation and Treatment     Row Name 05/23/23 0993          Physical Therapy Time and Intention    Document Type therapy note (daily note)  -     Mode of Treatment physical therapy  -AME     Patient Effort good  -     Row Name 05/23/23 0950          General Information    Patient Profile Reviewed yes  -     Existing Precautions/Restrictions fall  -     Row Name 05/23/23 0950          Pain    Pretreatment Pain Rating 7/10  -     Posttreatment Pain Rating 6/10  -AME     Pain Location - Side/Orientation Bilateral  -     Pain Location lower  -     Pain Location - extremity  -     Pain Intervention(s) Repositioned  -AME     Row Name 05/23/23 0950          Cognition    Affect/Mental Status (Cognition) WFL  -     Orientation Status  "(Cognition) oriented x 4  -     Personal Safety Interventions fall prevention program maintained;gait belt;muscle strengthening facilitated;supervised activity;nonskid shoes/slippers when out of bed  -     Row Name 05/23/23 0950          Bed Mobility    Bed Mobility supine-sit  -AME     Rolling Left Waddy (Bed Mobility) --  -AME     Rolling Right Waddy (Bed Mobility) --  -AME     Scooting/Bridging Waddy (Bed Mobility) --  -AME     Supine-Sit Waddy (Bed Mobility) contact guard  -     Sit-Supine Waddy (Bed Mobility) --  -     Assistive Device (Bed Mobility) head of bed elevated;bed rails  -     Comment, (Bed Mobility) pt sits EOB with SBA.  -     Row Name 05/23/23 0950          Transfers    Transfers sit-stand transfer;stand-sit transfer  -     Comment, (Transfers) pt stood 1' 30\" with CGA. pt defers t/f to recliner. pt is anxious and fearful of falling.  -     Row Name 05/23/23 0950          Bed-Chair Transfer    Bed-Chair Waddy (Transfers) --  -     Row Name 05/23/23 0950          Sit-Stand Transfer    Sit-Stand Waddy (Transfers) minimum assist (75% patient effort)  -     Assistive Device (Sit-Stand Transfers) walker, front-wheeled  -     Row Name 05/23/23 0950          Stand-Sit Transfer    Stand-Sit Waddy (Transfers) minimum assist (75% patient effort)  -     Assistive Device (Stand-Sit Transfers) walker, front-wheeled  -     Row Name 05/23/23 0950          Gait/Stairs (Locomotion)    [place] Level (Gait) --  -     Comment, (Gait/Stairs) pt able to take 2 very small sidesteps but declines further. pt is unsure of herself and fearful of falling.  -     Row Name 05/23/23 0950          Safety Issues, Functional Mobility    Impairments Affecting Function (Mobility) endurance/activity tolerance;strength;pain  -     Row Name 05/23/23 0950          Motor Skills    Therapeutic Exercise --  ankle DF/PF(R LE), LAQ, hip flexion, hip " Ab/Ad-20-25x1 arabella AROM. 20 reos R LE. 25 reps L LE. GS- 20x1 arabella. reaching beyond arms length. single leg bridging- 20x1  -AME     Row Name             Wound 05/17/23 1828 Right anterior fifth toe    Wound - Properties Group Placement Date: 05/17/23  - Placement Time: 1828  -JH Side: Right  -JH Orientation: anterior  -JH Location: fifth toe  -JH    Retired Wound - Properties Group Placement Date: 05/17/23  - Placement Time: 1828  -JH Side: Right  -JH Orientation: anterior  -JH Location: fifth toe  -JH    Retired Wound - Properties Group Date first assessed: 05/17/23  - Time first assessed: 1828  -JH Side: Right  -JH Location: fifth toe  -JH    Row Name             Wound 05/23/23 1006 Right distal leg    Wound - Properties Group Placement Date: 05/23/23  - Placement Time: 1006  -LH Present on Hospital Admission: N  -LH Side: Right  -LH Orientation: distal  -LH Location: leg  -LH    Retired Wound - Properties Group Placement Date: 05/23/23  - Placement Time: 1006  -LH Present on Hospital Admission: N  -LH Side: Right  -LH Orientation: distal  -LH Location: leg  -LH    Retired Wound - Properties Group Date first assessed: 05/23/23  - Time first assessed: 1006  -LH Present on Hospital Admission: N  -LH Side: Right  -LH Location: leg  -LH    Row Name 05/23/23 0950          Vital Signs    Pre Systolic BP Rehab 126  -AME     Pre Treatment Diastolic BP 58  -AME     Post Systolic BP Rehab 131  -AME     Post Treatment Diastolic BP 60  -AME     Pretreatment Heart Rate (beats/min) 68  -AME     Posttreatment Heart Rate (beats/min) 75  -AME     Pre SpO2 (%) 97  -AME     O2 Delivery Pre Treatment nasal cannula  -AME     Post SpO2 (%) 98  -AME     O2 Delivery Post Treatment supplemental O2  -AME     Pre Patient Position Supine  -AME     Post Patient Position Supine  -AME     Row Name 05/23/23 0950          Positioning and Restraints    Pre-Treatment Position in bed  -AME     Post Treatment Position bed  -AME     In Bed fowlers;call  light within reach;encouraged to call for assist;exit alarm on  all needs met  -     Row Name 05/23/23 0950          Therapy Assessment/Plan (PT)    Rehab Potential (PT) good, to achieve stated therapy goals  -     Criteria for Skilled Interventions Met (PT) yes;meets criteria;skilled treatment is necessary  -     Therapy Frequency (PT) other (see comments)  5-7 days/wk  -     Row Name 05/23/23 0950          Bed Mobility Goal 1 (PT)    Activity/Assistive Device (Bed Mobility Goal 1, PT) bed mobility activities, all  -AME     [place] Level/Cues Needed (Bed Mobility Goal 1, PT) standby assist;independent  -AME     Time Frame (Bed Mobility Goal 1, PT) by discharge  -AME     Progress/Outcomes (Bed Mobility Goal 1, PT) goal met  -     Row Name 05/23/23 0950          Transfer Goal 1 (PT)    Activity/Assistive Device (Transfer Goal 1, PT) sit-to-stand/stand-to-sit;bed-to-chair/chair-to-bed  -AME     [place] Level/Cues Needed (Transfer Goal 1, PT) contact guard required;standby assist;modified independence  -AME     Time Frame (Transfer Goal 1, PT) by discharge  -AME     Progress/Outcome (Transfer Goal 1, PT) goal not met  -     Row Name 05/23/23 0950          Gait Training Goal 1 (PT)    Activity/Assistive Device (Gait Training Goal 1, PT) gait (walking locomotion);decrease fall risk  -     [place] Level (Gait Training Goal 1, PT) contact guard required;standby assist;modified independence  -AME     Distance (Gait Training Goal 1, PT) 50ft or more each trip  -AME     Time Frame (Gait Training Goal 1, PT) 1 week  -AME     Progress/Outcome (Gait Training Goal 1, PT) goal not met  -     Row Name 05/23/23 0950          ROM Goal 1 (PT)    ROM Goal 1 (PT) Pt will tolerate LE exercises OOB in chair with VSS  -AME     Time Frame (ROM Goal 1, PT) by discharge  -AME     Progress/Outcome (ROM Goal 1, PT) goal not met  -           User Key  (r) = Recorded By, (t) = Taken By, (c) = Cosigned By    Initials Name  Provider Type    AME Ousmane Zhang PTA Physical Therapist Assistant    Juany Sage, RN Registered Nurse    Janae Severino LPN Licensed Nurse                Physical Therapy Education     Title: PT OT SLP Therapies (In Progress)     Topic: Physical Therapy (In Progress)     Point: Mobility training (Done)     Learning Progress Summary           Patient Acceptance, E,TB, VU by NB at 5/18/2023 2138    Acceptance, E, NR by AME at 5/18/2023 1407                   Point: Home exercise program (Not Started)     Learner Progress:  Not documented in this visit.          Point: Body mechanics (Not Started)     Learner Progress:  Not documented in this visit.          Point: Precautions (In Progress)     Learning Progress Summary           Patient Acceptance, E, NR by AME at 5/18/2023 1407                               User Key     Initials Effective Dates Name Provider Type Discipline    AME 06/16/21 -  Aure Villaseñor, PT Physical Therapist PT    NB 06/16/21 -  Samantha Cuellar RN Registered Nurse Nurse              PT Recommendation and Plan  Anticipated Discharge Disposition (PT): home with assist, home with home health  Therapy Frequency (PT): other (see comments) (5-7 days/wk)  Plan of Care Reviewed With: patient  Progress: improving  Outcome Evaluation: pt now wearing . no prosthesis yet. pt requires SBA for sup-sit-sup. SBA for sitting balance. min A for sit-stand-sit. pt stood 1.5 mins. pt took 2 very small sidesteps. pt declines OOB to recliner. pt appears fearful of falling. pt participated in LE and core strengthening. no new goals met at this time. pt would continue to benefit from PT services.   Outcome Measures     Row Name 05/21/23 3091             How much help from another person do you currently need...    Turning from your back to your side while in flat bed without using bedrails? 3  -AME      Moving from lying on back to sitting on the side of a flat bed without bedrails? 3  -AME       Moving to and from a bed to a chair (including a wheelchair)? 1  -AME      Standing up from a chair using your arms (e.g., wheelchair, bedside chair)? 1  -AME      Climbing 3-5 steps with a railing? 1  -AME      To walk in hospital room? 1  -AME      AM-PAC 6 Clicks Score (PT) 10  -AME         Functional Assessment    Outcome Measure Options AM-PAC 6 Clicks Basic Mobility (PT)  -AME            User Key  (r) = Recorded By, (t) = Taken By, (c) = Cosigned By    Initials Name Provider Type    Ousmane Gomes PTA Physical Therapist Assistant                 Time Calculation:    PT Charges     Row Name 05/23/23 1030             Time Calculation    Start Time 0950  -AME      Stop Time 1030  -AME      Time Calculation (min) 40 min  -AME         Time Calculation- PT    Total Timed Code Minutes- PT 40 minute(s)  -AME         Timed Charges    77614 - PT Therapeutic Exercise Minutes 23  -AME      19084 - PT Therapeutic Activity Minutes 17  -AME         Total Minutes    Timed Charges Total Minutes 40  -AME       Total Minutes 40  -AME            User Key  (r) = Recorded By, (t) = Taken By, (c) = Cosigned By    Initials Name Provider Type    Ousmane Gomes PTA Physical Therapist Assistant              Therapy Charges for Today     Code Description Service Date Service Provider Modifiers Qty    07256271305 HC PT THERAPEUTIC ACT EA 15 MIN 5/23/2023 Ousmane Zhang PTA GP 1    46106192727 HC PT THER PROC EA 15 MIN 5/23/2023 Ousmane Zhang PTA GP 2          PT G-Codes  Outcome Measure Options: AM-PAC 6 Clicks Daily Activity (OT)  AM-PAC 6 Clicks Score (PT): 10  AM-PAC 6 Clicks Score (OT): 19    Ousmane Zhang PTA  5/23/2023

## 2023-05-23 NOTE — PAYOR COMM NOTE
"  Linda Barnard RN [facility]  [phone]     Phone  [phone]      Fax  Cont. Stay Review      Ariana Bailey (63 y.o. Female)     Date of Birth   1960    Social Security Number       Address   [address] [facility] [address]    Home Phone   [phone]    MRN   [id number]       Sabianist   Congregational    Marital Status                               Admission Date   5/17/23    Admission Type   Emergency    Admitting Provider       Attending Provider   Santy Rodriguez MD    Department, Room/Bed   [facility] [address], 411/1       Discharge Date       Discharge Disposition       Discharge Destination                               Attending Provider: Santy Rodriguez MD    Allergies: Seroquel [Quetiapine], Avandia [Rosiglitazone], Morphine And Related, Oxycodone    Isolation: None   Infection: None   Code Status: CPR    Ht: 172.7 cm (68\")   Wt: 129 kg (284 lb)    Admission Cmt: None   Principal Problem: Sepsis without acute organ dysfunction, due to unspecified organism [A41.9]                 Active Insurance as of 5/17/2023     Primary Coverage     Payor Plan Insurance Group Employer/Plan Group    [facility] BLUE [employer] EMPLOYEE [id number]     Payor Plan Address Payor Plan Phone Number Payor Plan Fax Number Effective Dates    [address] [phone]  1/1/2022 - None Entered    [place] [address]       Subscriber Name Subscriber Birth Date Member ID       ARIANA BAILEY 1960 [id number]                 Emergency Contacts      (Rel.) Home Phone Work Phone Mobile Phone    Nadeem Bailey (Spouse) [phone] -- [phone]    Елена David (Sister) [phone] -- [phone]            Vital Signs (last day)     Date/Time Temp Temp src Pulse Resp BP Patient Position SpO2    05/23/23 1101 98.7 (37.1) Temporal 67 18 105/54 Lying 97    05/23/23 0855 96.4 (35.8) " Temporal 73 18 114/54 Lying 98    05/23/23 0724 -- -- 66 -- -- -- --    05/23/23 0343 97.7 (36.5) Temporal 69 18 104/60 Lying 92    05/23/23 0029 -- -- 65 -- -- -- --    05/22/23 1930 -- -- -- -- -- -- 96    05/22/23 1915 96.9 (36.1) Temporal 71 18 128/60 Lying 97    05/22/23 1807 -- -- 75 -- -- -- --    05/22/23 1438 97.9 (36.6) Temporal 62 19 115/54 Lying 96    05/22/23 1113 -- -- 65 -- 99/46 -- 97    05/22/23 0900 96.4 (35.8) Temporal 67 14 128/62 Lying 95    05/22/23 0714 -- -- 66 -- -- -- --    05/22/23 0320 96.9 (36.1) -- 72 18 150/52 -- 92    05/22/23 0125 -- -- 74 -- -- -- --          Oxygen Therapy (last day)     Date/Time SpO2 Device (Oxygen Therapy) Flow (L/min) Oxygen Concentration (%) ETCO2 (mmHg)    05/23/23 1101 97 nasal cannula 4 -- --    05/23/23 0855 98 nasal cannula 4 -- --    05/23/23 0343 92 nasal cannula 4 -- --    05/22/23 1930 96 nasal cannula 4 -- --    05/22/23 1915 97 nasal cannula 5 -- --    05/22/23 1438 96 nasal cannula 5 -- --    05/22/23 1113 97 -- -- -- --    05/22/23 0900 95 nasal cannula 3 -- --    05/22/23 0320 92 nasal cannula 3 -- --          Current Facility-Administered Medications   Medication Dose Route Frequency Provider Last Rate Last Admin   • acetaminophen (TYLENOL) tablet 650 mg  650 mg Oral Q4H PRN Reji Mendoza,    650 mg at 05/23/23 0609   • aluminum-magnesium hydroxide-simethicone (MAALOX MAX) 400-400-40 MG/5ML suspension 15 mL  15 mL Oral Q6H PRN Christian Mendozao R, DO       • amLODIPine (NORVASC) tablet 5 mg  5 mg Oral Daily Reji Mendoza R, DO   5 mg at 05/23/23 0902   • ARIPiprazole (ABILIFY) tablet 5 mg  5 mg Oral Daily Reji Mendoza R, DO   5 mg at 05/23/23 1000   • aspirin EC tablet 325 mg  325 mg Oral Daily Reji Mendoza R, DO   325 mg at 05/23/23 0902   • atorvastatin (LIPITOR) tablet 80 mg  80 mg Oral Daily Reji Mendoza R, DO   80 mg at 05/23/23 0902   • sennosides-docusate (PERICOLACE) 8.6-50 MG per tablet 2 tablet  2 tablet Oral  BID Santy Rodriguez MD   2 tablet at 05/23/23 0901    And   • polyethylene glycol (MIRALAX) packet 17 g  17 g Oral Daily PRN Santy Rodriguez MD        And   • bisacodyl (DULCOLAX) EC tablet 5 mg  5 mg Oral Daily PRN Santy Rodriguez MD        And   • bisacodyl (DULCOLAX) suppository 10 mg  10 mg Rectal Daily PRN Santy Rodriguez MD       • cefepime (MAXIPIME) 2 g/100 mL 0.9% NS (mbp)  2 g Intravenous Q8H Santy Rodriguez MD   2 g at 05/23/23 0441   • clopidogrel (PLAVIX) tablet 75 mg  75 mg Oral Daily Reji Mendoza, DO   75 mg at 05/23/23 0902   • dextrose (D50W) (25 g/50 mL) IV injection 25 g  25 g Intravenous Q15 Min PRN Santy Rodriguez MD       • dextrose (GLUTOSE) oral gel 15 g  15 g Oral Q15 Min PRN Santy Rodriguez MD       • folic acid (FOLVITE) tablet 1,000 mcg  1,000 mcg Oral Daily Reji Mendoza R, DO   1,000 mcg at 05/23/23 0902   • furosemide (LASIX) tablet 40 mg  40 mg Oral Daily Reji Mendoza, DO   40 mg at 05/23/23 0902   • gabapentin (NEURONTIN) capsule 200 mg  200 mg Oral Q12H Santy Rodriguez MD   200 mg at 05/23/23 0902   • glucagon (human recombinant) (GLUCAGEN DIAGNOSTIC) injection 1 mg  1 mg Intramuscular Q15 Min PRN Santy Rodriguez MD       • guaiFENesin-dextromethorphan (ROBITUSSIN DM) 100-10 MG/5ML syrup 5 mL  5 mL Oral Q4H PRN Santy Rodriguez MD   5 mL at 05/22/23 2348   • heparin (porcine) 5000 UNIT/ML injection 5,000 Units  5,000 Units Subcutaneous Q8H Reji Mendoza DO   5,000 Units at 05/23/23 0607   • hydroCHLOROthiazide (HYDRODIURIL) tablet 12.5 mg  12.5 mg Oral Daily Reji Mendoza DO   12.5 mg at 05/23/23 0901   • HYDROcodone-acetaminophen (NORCO) 7.5-325 MG per tablet 1 tablet  1 tablet Oral Q6H PRN Reji Mendoza DO   1 tablet at 05/23/23 0903   • HYDROmorphone (DILAUDID) injection 1 mg   1 mg Intravenous Q2H PRN Santy Rodriguez MD        And   • naloxone (NARCAN) injection 0.4 mg  0.4 mg Intravenous Q5 Min PRN Santy Rodriguez MD       • Insulin Aspart (novoLOG) injection 0-14 Units  0-14 Units Subcutaneous TID AC Reji Mendoza, DO   3 Units at 05/23/23 1112   • insulin detemir (LEVEMIR) injection 20 Units  20 Units Subcutaneous Daily Mahin Esteves MD   20 Units at 05/23/23 1002   • insulin detemir (LEVEMIR) injection 45 Units  45 Units Subcutaneous Nightly Santy Rodriguez MD   45 Units at 05/22/23 2047   • isosorbide mononitrate (IMDUR) 24 hr tablet 30 mg  30 mg Oral Daily Reji Mendoza, DO   30 mg at 05/23/23 0902   • levothyroxine (SYNTHROID, LEVOTHROID) tablet 50 mcg  50 mcg Oral QAM Reji Mendoza, DO   50 mcg at 05/23/23 0607   • LORazepam (ATIVAN) tablet 0.5 mg  0.5 mg Oral Q8H PRN Reji Mendoza, DO   0.5 mg at 05/23/23 1112   • Magnesium Standard Dose Replacement - Follow Nurse / BPA Driven Protocol   Does not apply PRN Santy Rodriguez MD       • metoclopramide (REGLAN) tablet 10 mg  10 mg Oral 4x Daily PRN Reji Mendoza, DO       • nitroglycerin (NITROSTAT) SL tablet 0.4 mg  0.4 mg Sublingual Q5 Min PRN Reji Mendoza, DO       • ondansetron (ZOFRAN) injection 4 mg  4 mg Intravenous Q6H PRN Santy Rodriguez MD       • ondansetron (ZOFRAN) tablet 4 mg  4 mg Oral Q6H PRN Reji Mendoza, DO   4 mg at 05/22/23 1159   • pantoprazole (PROTONIX) EC tablet 40 mg  40 mg Oral Daily Reji Mendoza, DO   40 mg at 05/23/23 0902   • Pharmacy to dose vancomycin   Does not apply Continuous PRN Reji Mendoza R, DO       • potassium chloride (K-DUR,KLOR-CON) ER tablet 40 mEq  40 mEq Oral Q4H Mahin Esteves MD   40 mEq at 05/23/23 1000   • Potassium Replacement - Follow Nurse / BPA Driven Protocol   Does not apply PRN Santy Rodriguez MD       •  ranolazine (RANEXA) 12 hr tablet 500 mg  500 mg Oral Q12H Christian Mendozao R, DO   500 mg at 05/23/23 0902   • sodium chloride 0.9 % flush 10 mL  10 mL Intravenous PRN Reji, Reji R, DO       • sodium chloride 0.9 % flush 10 mL  10 mL Intravenous Q12H Reji, Reji R, DO   10 mL at 05/23/23 0900   • sodium chloride 0.9 % flush 10 mL  10 mL Intravenous PRN Bereket Mendozaardo R, DO       • sodium chloride 0.9 % flush 10 mL  10 mL Intravenous Q12H Santy Rodriguez MD   10 mL at 05/23/23 0900   • sodium chloride 0.9 % flush 10 mL  10 mL Intravenous PRN Santy Rodriguez MD       • sodium chloride 0.9 % infusion 40 mL  40 mL Intravenous PRN Reji Mendoza R, DO       • sodium chloride 0.9 % infusion 40 mL  40 mL Intravenous PRN Santy Rodriguez MD   40 mL at 05/22/23 1327   • sucralfate (CARAFATE) tablet 1 g  1 g Oral 4x Daily Christian Mendozao R, DO   1 g at 05/23/23 1112   • tamsulosin (FLOMAX) 24 hr capsule 0.4 mg  0.4 mg Oral Daily Santy Rodriguez MD   0.4 mg at 05/23/23 0901   • vancomycin 1750 mg/500 mL 0.9% NS IVPB ([facility])  1,750 mg Intravenous Q24H Christian Mendozao R, DO   1,750 mg at 05/23/23 1112   • venlafaxine XR (EFFEXOR-XR) 24 hr capsule 75 mg  75 mg Oral Daily With Breakfast Christian Mendozao R, DO   75 mg at 05/23/23 0902        Physician Progress Notes (last 48 hours)      Mahin Esteves MD at 05/22/23 1414              [facility] Medicine Services  INPATIENT PROGRESS NOTE    Length of Stay: 5  Date of Admission: 5/17/2023  Primary Care Physician: Dolly Foss, BEBE    Subjective   Chief Complaint: Right lower extremity pain and erythema, fever/chills  HPI: Patient states that she is having difficulty with urinary retention, but otherwise is feeling better.    As of today, 05/22/23  Review of Systems   Constitutional: Negative for appetite change, chills, fatigue, fever and  unexpected weight change.   Respiratory: Negative for cough, choking, chest tightness, shortness of breath and wheezing.    Cardiovascular: Negative for chest pain, palpitations and leg swelling.   Gastrointestinal: Negative for abdominal pain, blood in stool, constipation, diarrhea, nausea and vomiting.   Genitourinary: Positive for difficulty urinating. Negative for dysuria, flank pain and hematuria.   Skin: Positive for color change.   Neurological: Negative for dizziness, seizures, syncope, speech difficulty, weakness, light-headedness, numbness and headaches.   Hematological: Does not bruise/bleed easily.        All pertinent negatives and positives are as above. All other systems have been reviewed and are negative unless otherwise stated.    Objective    Temp:  [96.4 °F (35.8 °C)-96.9 °F (36.1 °C)] 96.4 °F (35.8 °C)  Heart Rate:  [65-74] 65  Resp:  [14-18] 14  BP: ()/(46-62) 99/46    AM-PAC 6 Clicks Score (PT): 10 (05/21/23 2047)    As of today, 05/22/23  Physical Exam  Vitals reviewed.   Constitutional:       Appearance: She is well-developed.   HENT:      Head: Normocephalic and atraumatic.   Eyes:      Pupils: Pupils are equal, round, and reactive to light.   Cardiovascular:      Rate and Rhythm: Normal rate and regular rhythm.      Heart sounds: Normal heart sounds. No murmur heard.    No friction rub. No gallop.   Pulmonary:      Effort: Pulmonary effort is normal. No respiratory distress.      Breath sounds: Normal breath sounds. No wheezing or rales.   Chest:      Chest wall: No tenderness.   Abdominal:      General: Bowel sounds are normal. There is no distension.      Palpations: Abdomen is soft.      Tenderness: There is no abdominal tenderness. There is no guarding.   Musculoskeletal:      Cervical back: Normal range of motion and neck supple.      Comments: Left BKA   Skin:     General: Skin is warm and dry.      Comments: Right lower extremity erythema improving   Psychiatric:          Behavior: Behavior normal.         Thought Content: Thought content normal.           Results Review:  I have reviewed the labs, radiology results, and diagnostic studies.    Laboratory Data:   Results from last 7 days   Lab Units 05/22/23  0600 05/21/23  1851 05/21/23 0605 05/20/23 2021 05/20/23 0625 05/19/23  0246   SODIUM mmol/L 135*  --  136  --  136 131*   POTASSIUM mmol/L 3.9 4.4 3.6   < > 3.0* 3.7   CHLORIDE mmol/L 97*  --  98  --  98 96*   CO2 mmol/L 27.0  --  28.0  --  27.0 23.0   BUN mg/dL 9  --  10  --  12 15   CREATININE mg/dL 0.90  --  0.98  --  1.15* 1.12*   GLUCOSE mg/dL 228*  --  236*  --  198* 181*   CALCIUM mg/dL 9.1  --  8.9  --  8.8 8.5*   BILIRUBIN mg/dL  --   --  0.6  --  0.6 0.6   ALK PHOS U/L  --   --  147*  --  105 90   ALT (SGPT) U/L  --   --  12  --  12 13   AST (SGOT) U/L  --   --  18  --  15 16   ANION GAP mmol/L 11.0  --  10.0  --  11.0 12.0    < > = values in this interval not displayed.     Estimated Creatinine Clearance: 92.7 mL/min (by C-G formula based on SCr of 0.9 mg/dL).  Results from last 7 days   Lab Units 05/21/23 0605 05/17/23  0954   MAGNESIUM mg/dL 1.8 1.6         Results from last 7 days   Lab Units 05/22/23  0600 05/21/23 0605 05/20/23 0625 05/19/23 0246 05/18/23  0556   WBC 10*3/mm3 14.84* 16.28* 17.18* 19.52* 16.99*   HEMOGLOBIN g/dL 11.8* 10.9* 10.7* 11.5* 10.7*   HEMATOCRIT % 34.2 33.1* 32.6* 35.1 31.8*   PLATELETS 10*3/mm3 401 395 308 207 233           Culture Data:   No results found for: BLOODCX  No results found for: URINECX  No results found for: RESPCX  No results found for: WOUNDCX  No results found for: STOOLCX  No components found for: BODYFLD    Radiology Data:   Imaging Results (Last 24 Hours)     Procedure Component Value Units Date/Time    US Guided Vascular Access [[id number]] Collected: 05/22/23 0812     Updated: 05/22/23 0815    Narrative:      Ultrasound guidance vascular    FINDINGS:  Real-time ultrasound imaging was provided for vascular  access.  Please refer to  procedure note for real-time exam findings.  The left basilicVein was found to  be patent and compressible.  Access under direct sonographic visualization.  Permanent saved images sent to the PACS for documentation.      IR Insert Midline Without Port Pump 5 Plus [[id number]] Resulted: 05/22/23 0802     Updated: 05/22/23 0802    Narrative:      This procedure was auto-finalized with no dictation required.          I have utilized all available immediate resources to obtain, update, or review the patient's current medications (including all prescriptions, over-the-counter products, herbals, cannabis/cannabidiol products, and vitamin/mineral/dietary (nutritional) supplements).       Assessment/Plan     Active Hospital Problems    Diagnosis    • **Sepsis without acute organ dysfunction, due to unspecified organism        Plan:  1.  Right lower extremity cellulitis: Improving.  Continue empiric antibiotics for now.  2.  Urinary retention: Patient had multiple episodes of bladder scanning with greater than 700 cc of urine requiring in and out caths.  Jose catheter has been reanchored and urology has been consulted.  3.  Hypertension: Well-controlled.  4.  Coronary artery disease: Continue home medication.  5.  Diabetes mellitus: All glucose readings are in the 200 range.  Levemir increased yesterday.  Currently on 45 units of Levemir at night.  At home on 40 units of Basaglar at night and 35 units in the morning.  Will add morning Levemir.  6.  DVT prophylaxis: Heparin.      Medical Decision Making  Number and Complexity of problems: Several highly complex medical problems    Conditions and Status:        Condition is improving.       Discussed with: Patient and      Treatment Plan  As above    Care Planning  Shared decision making: Patient in agreement with plan of care  Code status and discussions: Full code    Disposition  Social Determinants of Health that impact treatment or  disposition: None  I expect the patient to be discharged to home in 1-2 days.       The patient was evaluated during the global COVID-19 pandemic, and the diagnosis was suspected/considered upon their initial presentation.  Evaluation, treatment, and testing were consistent with current guidelines for patients who present with complaints or symptoms that may be related to COVID-19.    I confirmed that the patient's Advance Care Plan is present, code status is documented, or surrogate decision maker is listed in the patient's medical record.        This document has been electronically signed by Mahin Esteves MD on May 22, 2023 14:14 CDT        Electronically signed by Mahin Esteves MD at 23 1440     Doni Baker MD at 23          Enter Query Response Below      Query Response:     Cellulitis related to Diabetes    Electronically signed by Doni Baker MD, 23, 8:46 PM CDT.              If applicable, please update the problem list.     Patient: Ariana Martinez        : 1960  Account: [id number]           Admit Date: 23        How to Respond to this query:       a. Click New Note     b. Answer query within the yellow box.                c. Update the Problem List, if applicable.      If you have any questions about this query contact me at: [email or web address]: [employer]     ,     64 y/o noted with Sepsis, Cellulitis right lower extremity, Hypertension, Hyperlipidemia, GERD, Bipolar and Diabetes. On , Hemoglobin A1C 9.80. Labs with glucose 181 to 319. Patient treated with monitoring, Sliding Scale Insulin, Zosyn and Vancomycin.     After study, can the patient's condition be specified as:     >>Cellulitis related to Diabetes  >>Cellulitis Not related to Diabetes  >>Other (please specify):___________  >>Unable to determine     By submitting this query, we are merely seeking further clarification of documentation to accurately reflect all conditions  that you are monitoring, evaluating, treating or that extend the hospitalization or utilize additional resources of care. Please utilize your independent clinical judgment when addressing the question(s) above.     This query and your response, once completed, will be entered into the legal medical record.    Sincerely,  Jada Espino RN CCDS   Clinical Documentation Integrity Program       Electronically signed by Doni Baker MD at 23     Santy Rodriguez MD at 23 1221              [facility] HOSPITALIST PROGRESS NOTE     Patient Identification:  Name:  Ariana Martinez  Age:  63 y.o.  Sex:  female  :  1960  MRN:  [id number]  Visit Number:  [id number]  Primary Care Provider:  Dolly Foss APRN    Length of stay:  4        Subjective:    Seen and evaluated with the nursing staff.  Overnight patient developed pain swelling in the right upper extremity.  Complained of pain in her left BKA stump.  ----------------------------------------------------------------------------------------------------------------------  Current Hospital Meds:  !Vancomycin Level Draw Needed, , Does not apply, Once  amLODIPine, 5 mg, Oral, Daily  ARIPiprazole, 5 mg, Oral, Daily  aspirin EC, 325 mg, Oral, Daily  atorvastatin, 80 mg, Oral, Daily  clopidogrel, 75 mg, Oral, Daily  folic acid, 1,000 mcg, Oral, Daily  furosemide, 40 mg, Oral, Daily  gabapentin, 200 mg, Oral, Q12H  heparin (porcine), 5,000 Units, Subcutaneous, Q8H  hydroCHLOROthiazide, 12.5 mg, Oral, Daily  Insulin Aspart, 0-14 Units, Subcutaneous, TID AC  insulin detemir, 40 Units, Subcutaneous, Nightly  isosorbide mononitrate, 30 mg, Oral, Daily  levothyroxine, 50 mcg, Oral, QAM  pantoprazole, 40 mg, Oral, Daily  piperacillin-tazobactam, 3.375 g, Intravenous, Q8H  potassium chloride ER, 40 mEq, Oral, Q4H  ranolazine, 500 mg, Oral, Q12H  senna-docusate sodium, 2 tablet, Oral, BID  sodium chloride,  10 mL, Intravenous, Q12H  sodium chloride, 10 mL, Intravenous, Q12H  sucralfate, 1 g, Oral, 4x Daily  tamsulosin, 0.4 mg, Oral, Daily  vancomycin, 1,000 mg, Intravenous, Q12H  venlafaxine XR, 75 mg, Oral, Daily With Breakfast      Pharmacy to dose vancomycin,       ----------------------------------------------------------------------------------------------------------------------  Vital Signs:  Temp:  [96.4 °F (35.8 °C)-97.5 °F (36.4 °C)] 96.8 °F (36 °C)  Heart Rate:  [67-80] 73  Resp:  [16-18] 18  BP: (106-157)/(55-71) 106/55      05/18/23  0500 05/19/23  0411 05/21/23  0329   Weight: 127 kg (280 lb) 131 kg (289 lb 1.6 oz) 132 kg (290 lb 1.6 oz)     Body mass index is 44.11 kg/m².    Intake/Output Summary (Last 24 hours) at 5/21/2023 1225  Last data filed at 5/21/2023 0900  Gross per 24 hour   Intake 720 ml   Output 3925 ml   Net -3205 ml     Diet: Diabetic Diets; Consistent Carbohydrate; Texture: Regular Texture (IDDSI 7); Fluid Consistency: Thin (IDDSI 0)  ----------------------------------------------------------------------------------------------------------------------  Physical exam:  Constitutional:  Middle aged woman, she does not seem to be in acute distress      HENT:  Head:  Normocephalic and atraumatic.  Mouth:  Moist mucous membranes.    Eyes:  Conjunctivae and EOM are normal.  Pupils are equal, round, and reactive to light.  No scleral icterus.   Neck:  Neck supple.  No JVD present.    Cardiovascular:  Normal rate, regular rhythm and normal heart sounds with no murmur.  Pulmonary/Chest:  No respiratory distress, no wheezes, no crackles, with normal breath sounds and good air movement.  Abdominal:  Soft and obese.  Bowel sounds are normal.  No distension and no tenderness.   Musculoskeletal: Decreased swelling and redness of the right lower extremity. Swollen/tender RUE. Left BKA   Neurological:  Alert and oriented to person, place, and time.  No cranial nerve deficit.  No tongue deviation.  No  facial droop.  No slurred speech.   Skin: Right lower extremity erythematous, swollen with open wound on the fourth toe dorsal surface  Peripheral vascular:  Strong pulses in  extremities with no clubbing, no cyanosis.  1+ edema right lower extremity.  Genitourinary: Jose catheter in place  ----------------------------------------------------------------------------------------------------------------------  Tele:    ----------------------------------------------------------------------------------------------------------------------  Results from last 7 days   Lab Units 05/19/23  0611 05/19/23  0246 05/17/23  0954   HSTROP T ng/L 26* 25* 39*     Results from last 7 days   Lab Units 05/21/23  0605 05/20/23  0625 05/19/23  0246 05/18/23  0556 05/18/23  0000 05/17/23  0954   LACTATE mmol/L  --   --   --   --  1.2 1.7   WBC 10*3/mm3 16.28* 17.18* 19.52*   < >  --  19.65*   HEMOGLOBIN g/dL 10.9* 10.7* 11.5*   < >  --  11.8*   HEMATOCRIT % 33.1* 32.6* 35.1   < >  --  34.1   MCV fL 85.8 86.0 86.9   < >  --  85.0   MCHC g/dL 32.9 32.8 32.8   < >  --  34.6   PLATELETS 10*3/mm3 395 308 207   < >  --  249    < > = values in this interval not displayed.     Results from last 7 days   Lab Units 05/17/23  1026   PH, ARTERIAL pH units 7.451*   PO2 ART mm Hg 65.7*   PCO2, ARTERIAL mm Hg 36.0   HCO3 ART mmol/L 25.1     Results from last 7 days   Lab Units 05/21/23  0605 05/20/23  2021 05/20/23  0625 05/19/23  0246 05/17/23  1728 05/17/23  0954   SODIUM mmol/L 136  --  136 131*   < > 128*   POTASSIUM mmol/L 3.6 3.5 3.0* 3.7   < > 3.3*   MAGNESIUM mg/dL 1.8  --   --   --   --  1.6   CHLORIDE mmol/L 98  --  98 96*   < > 92*   CO2 mmol/L 28.0  --  27.0 23.0   < > 21.0*   BUN mg/dL 10  --  12 15   < > 14   CREATININE mg/dL 0.98  --  1.15* 1.12*   < > 1.26*   CALCIUM mg/dL 8.9  --  8.8 8.5*   < > 8.5*   GLUCOSE mg/dL 236*  --  198* 181*   < > 307*   ALBUMIN g/dL 2.8*  --  2.7* 2.9*   < > 3.0*   BILIRUBIN mg/dL 0.6  --  0.6 0.6   < > 0.7    ALK PHOS U/L 147*  --  105 90   < > 90   AST (SGOT) U/L 18  --  15 16   < > 13   ALT (SGPT) U/L 12  --  12 13   < > 11    < > = values in this interval not displayed.   Estimated Creatinine Clearance: 86.7 mL/min (by C-G formula based on SCr of 0.98 mg/dL).    No results found for: AMMONIA      Blood Culture   Date Value Ref Range Status   05/17/2023 No growth at 4 days  Preliminary   05/17/2023 No growth at 4 days  Preliminary     No results found for: URINECX  No results found for: WOUNDCX  No results found for: STOOLCX    I have personally looked at the labs and they are summarized above.  ----------------------------------------------------------------------------------------------------------------------  Imaging Results (Last 24 Hours)     Procedure Component Value Units Date/Time    US Venous Doppler Upper Extremity Right (duplex) [[id number]] Collected: 05/21/23 1155     Updated: 05/21/23 1153    Narrative:      US VENOUS DOPPLER UPPER EXTREMITY RIGHT (DUPLEX)    HISTORY: RUE swelling and pain    COMPARISON: NONE    FINDINGS:  Transverse and longitudinal grayscale and Doppler sonographic images of the  right upper extremity were obtained. Spectral analysis was also obtained.    There is normal flow and compressibility of the right subclavian, axillary,  brachial, and forearm veins.    Thrombus is noted in the cephalic vein.      Impression:        1. No evidence of DVT in the right upper extremity.    2. Superficial venous thrombosis in the cephalic vein.            ----------------------------------------------------------------------------------------------------------------------  Assessment and Plan:  Active Hospital Problems     Diagnosis     • **Sepsis without acute organ dysfunction, due to unspecified organism     Right lower extremity cellulitis-improving  -Ultrasound is negative for DVT  -Prop up the right lower extremity  -Continue with multimodal pain management  - Celebrex 100 mg twice a day  -  Was on vancomycin and Zosyn.   - Discontinue  Zosyn and start with cefepime.     Hypokalemia.  Replace per protocol     Hypertension  -continue with amlodipine   -Continue with blood pressure monitoring     Coronary artery disease  -continue with aspirin, isosorbide, ranolazine, atorvastatin     Hyperlipidemia  -continue with Lipitor     Type 2 diabetes with persistent hyperglycemia  -Carbohydrate consistent diet  -continue with sliding scale insulin  -  Increased dose of Levemir to 45 units at bedtime  - Blood glucose checks     GERD  -continue with Protonix     Morbid obesity.  BMI 43.96.        Status post left BKA.     Urinary retention.    Exact etiology not known.  Continue with Jose catheter care.  Continue p.o. tamsulosin.    Right upper extremity swelling/tenderness.  Ultrasound did not show any DVT.  Ultrasound showed superficial thrombosis.  Prop up right upper extremity.  Discontinue midline on the right upper extremity.     Prophylaxis:  DVT-heparin         Conditions and Status:   Patient condition is guarded        Treatment Plan  As above     Care Planning  Shared decision making: Patient  Code status and discussions: Full code     Disposition  Social Determinants of Health that impact treatment or disposition: None  I expect the patient to be discharged to home in 2 to 3 days.      Santy Rodriguez MD  05/21/23  12:25 CDT     Dragon disclaimer:  Part of this note may be an electronic transcription/translation of spoken language to printed text using the Dragon Dictation System.                        Electronically signed by Santy Rodriguez MD at 05/21/23 2809

## 2023-05-23 NOTE — PLAN OF CARE
Goal Outcome Evaluation:              Outcome Evaluation: pain meds given, no falls, johns intact, wound consult for blister on leg, able to communicate needs

## 2023-05-23 NOTE — CONSULTS
[facility]   Consult Note    Patient Name: Ariana Martinez  : 1960  MRN: [id number]  Primary Care Physician:  Dolly Foss APRN  Referring Physician: True Crocker MD  Date of admission: 2023    Consults  Subjective   Subjective     Reason for Consult/ Chief Complaint: Acute urinary retention    History of Present Illness  Ariana Martinez is a 63 y.o. female patient comes in for diagnosis of cellulitis who has acute urinary retention relatively new we have been asked to see and evaluate    Review of Systems     Personal History     Past Medical History:   Diagnosis Date   • Acute bacterial endocarditis 3/13/2021   • Angina, class IV    • Anxiety    • Anxiety and depression    • Arthritis    • Benign paroxysmal positional vertigo    • Bladder disorder     has bladder stimulator   • Carpal tunnel syndrome    • CHF (congestive heart failure)    • Chronic pain    • Coronary atherosclerosis     hx CABG .  is followed by Dr Kwon   • Depression    • Diabetes mellitus     Type 2, controlled   • Diabetic polyneuropathy    • Disease of thyroid gland    • Elevated cholesterol    • Female stress incontinence    • Foot pain, left    • Full dentures    • Gastroparesis    • GERD (gastroesophageal reflux disease)    • Hyperlipidemia    • Hypertension    • Low back pain    • Malaise and fatigue    • Multiple joint pain    • Obesity     Refuses to be weighed   • Occlusion and stenosis of bilateral carotid arteries 2021   • Otalgia     Both   • Perforation of tympanic membrane     Left   • Postoperative wound infection    • Vitamin D deficiency    • Wears glasses     reading       Past Surgical History:   Procedure Laterality Date   • ABDOMINAL SURGERY     • AMPUTATION FOOT     • ANGIOPLASTY      coronary   • ANKLE ARTHROSCOPY Left 2021    Procedure: Left foot hardware removal, ankle arthroscopy, bone grafting , left foot exostectomy;  Surgeon: Ignacio Lord DPM;  Location: [facility] OR;   Service: Podiatry;  Laterality: Left;   • BREAST BIOPSY Right    • CARDIAC CATHETERIZATION     • CARDIAC CATHETERIZATION N/A 06/20/2017    Procedure: Right Heart Cath;  Surgeon: Can Kwon MD PhD;  Location: [facility] CATH INVASIVE LOCATION;  Service:    • CARDIAC CATHETERIZATION N/A 02/18/2020    Procedure: Left Heart Cath;  Surgeon: Catalina Cooper MD;  Location: [facility] CATH INVASIVE LOCATION;  Service: Cardiology;  Laterality: N/A;   • CARDIAC CATHETERIZATION N/A 04/06/2022    Procedure: Left Heart Cath;  Surgeon: Sheryl Navas MD;  Location: [facility] CATH INVASIVE LOCATION;  Service: Cardiology;  Laterality: N/A;   • CARPAL TUNNEL RELEASE     • CHOLECYSTECTOMY     • COLONOSCOPY N/A 06/24/2020    Procedure: COLONOSCOPY;  Surgeon: Julián Maldonado MD;  Location: [facility] ENDOSCOPY;  Service: Gastroenterology;  Laterality: N/A;   • CORONARY ARTERY BYPASS GRAFT  2005   • ENDOSCOPY N/A 10/19/2018    Procedure: ESOPHAGOGASTRODUODENOSCOPY possible dilation;  Surgeon: Julián Maldonado MD;  Location: [facility] ENDOSCOPY;  Service: Gastroenterology   • ENDOSCOPY N/A 06/24/2020    Procedure: ESOPHAGOGASTRODUODENOSCOPY WED appt please;  Surgeon: Julián Maldonado MD;  Location: [facility] ENDOSCOPY;  Service: Gastroenterology;  Laterality: N/A;   • ENDOSCOPY N/A 06/10/2022    Procedure: ESOPHAGOGASTRODUODENOSCOPY   room 380;  Surgeon: Jeremiah Wilkins MD;  Location: [facility] ENDOSCOPY;  Service: Gastroenterology;  Laterality: N/A;   • ENDOSCOPY N/A 1/10/2023    Procedure: ESOPHAGOGASTRODUODENOSCOPY;  Surgeon: Jeremiah Wilkins MD;  Location: [facility] ENDOSCOPY;  Service: Gastroenterology;  Laterality: N/A;   • ENDOSCOPY AND COLONOSCOPY     • FOOT SURGERY      Toes   • FOOT SURGERY     • GASTRIC BANDING      Revision, laparoscopic   • HYSTERECTOMY     • INCISION AND DRAINAGE LEG Left 03/12/2021    Procedure: Left ankle arthroscopic irrigation and debridement, screw removal;  Surgeon: Ignacio Lord DPM;  Location:   [facility] OR;  Service: Podiatry;  Laterality: Left;   • MOUTH SURGERY     • SALPINGO OOPHORECTOMY     • SHOULDER SURGERY     • SUBTALAR ARTHRODESIS Left 01/16/2019    Procedure: LEFT FOOT HARDWARE REMOVAL, FIFTH METATARSAL , OPEN REDUCTION INTERNAL FIXATION, CALCANEAL OSTEOTOMY;  Surgeon: Ignacio Lord DPM;  Location: [facility] OR;  Service: Podiatry   • SUBTALAR ARTHRODESIS Left 10/16/2019    Procedure: foot hardware removal, subtalar joint fusion  possible de/reattachment of achilles tendon        (c-arm);  Surgeon: Ignacio Lord DPM;  Location: [facility] OR;  Service: Podiatry   • SUBTALAR ARTHRODESIS Left 09/30/2020    Procedure: subtalar, talonavicular joint arthrodesis.  Removal hardware.          (c-arm);  Surgeon: Ignacio Lord DPM;  Location: [facility] OR;  Service: Podiatry;  Laterality: Left;   • TRANSESOPHAGEAL ECHOCARDIOGRAM (LAMONTE)      With color flow       Family History: family history includes Diabetes in her sister, sister, sister, sister, sister, sister and another family member; Heart disease in her mother, sister, sister, and another family member; Hypertension in her mother, sister, sister, and another family member; Stroke in her mother. Otherwise pertinent FHx was reviewed and not pertinent to current issue.    Social History:  reports that she has never smoked. She has never used smokeless tobacco. She reports that she does not drink alcohol and does not use drugs.    Home Medications:   ARIPiprazole, BASAGLAR KWIKPEN, BD [employer], Calcium Citrate-Vitamin D, HYDROcodone-acetaminophen, Hypodermic Needle, Insulin Pen Needle, Needle (Disp), ONE TOUCH ULTRA MINI, Syringe, accu-chek soft touch, amLODIPine, aspirin EC, atorvastatin, butalbital-acetaminophen-caffeine, clopidogrel, cyanocobalamin, famotidine, fluticasone, folic acid, furosemide, gabapentin, glucose blood, glucose monitor, hydroCHLOROthiazide, insulin aspart, isosorbide mononitrate, levothyroxine, meclizine,  meloxicam, methocarbamol, metoclopramide, metoprolol succinate XL, naloxone, nitroglycerin, ondansetron, ondansetron ODT, pantoprazole, ranolazine, sucralfate, venlafaxine XR, and vitamin D    Allergies:  Allergies   Allergen Reactions   • Seroquel [Quetiapine] Anaphylaxis   • Avandia [Rosiglitazone] Swelling   • Morphine And Related Hallucinations   • Oxycodone Hallucinations       Objective    Objective     Vitals:  Temp:  [96.4 °F (35.8 °C)-97.9 °F (36.6 °C)] 96.9 °F (36.1 °C)  Heart Rate:  [62-74] 71  Resp:  [14-19] 18  BP: ()/(46-62) 128/60  Flow (L/min):  [3-5] 5    Physical Exam neck supple lungs clear abdomen benign obese Jose catheter in place    Result Review    Result Review:  I have personally reviewed the results from the time of this admission to 5/22/2023 19:50 CDT and agree with these findings:  []  Laboratory list / accordion  []  Microbiology  []  Radiology  []  EKG/Telemetry   []  Cardiology/Vascular   []  Pathology  []  Old records  []  Other:  Most notable findings include: Urinary retention with Jose      Assessment & Plan   Assessment / Plan     Brief Patient Summary:  Ariana Matrinez is a 63 y.o. female who urinary retention with Jose    Active Hospital Problems:  Active Hospital Problems    Diagnosis    • **Sepsis without acute organ dysfunction, due to unspecified organism      Plan: Urine CNS IV antibiotics cystoscopy continue Jose      Rohan Doe MD

## 2023-05-23 NOTE — PLAN OF CARE
Goal Outcome Evaluation:  Plan of Care Reviewed With: patient     Problem: Adult Inpatient Plan of Care  Goal: Plan of Care Review  Outcome: Ongoing, Progressing  Flowsheets (Taken 5/23/2023 1025)  Progress: improving  Plan of Care Reviewed With: patient  Outcome Evaluation: pt now wearing . no prosthesis yet. pt requires SBA for sup-sit-sup. SBA for sitting balance. min A for sit-stand-sit. pt stood 1.5 mins. pt took 2 very small sidesteps. pt declines OOB to recliner. pt appears fearful of falling. pt participated in LE and core strengthening. no new goals met at this time. pt would continue to benefit from PT services.

## 2023-05-23 NOTE — PROGRESS NOTES
"  Pharmacokinetics by Pharmacy - Vancomycin    Ariana Martinez is a 63 y.o. female  [Ht: 172.7 cm (68\"); Wt: 129 kg (284 lb)] is being treated for skin and soft tissue infection, day 7 of therapy.     Estimated Creatinine Clearance: 95.3 mL/min (by C-G formula based on SCr of 0.88 mg/dL).   Creatinine   Date Value Ref Range Status   05/23/2023 0.88 0.57 - 1.00 mg/dL Final   05/22/2023 0.90 0.57 - 1.00 mg/dL Final   05/21/2023 0.98 0.57 - 1.00 mg/dL Final      Lab Results   Component Value Date    WBC 14.56 (H) 05/23/2023    WBC 14.84 (H) 05/22/2023    WBC 16.28 (H) 05/21/2023     C-Reactive Protein   Date Value Ref Range Status   02/05/2022 <0.30 0.00 - 0.50 mg/dL Final   05/24/2021 3.20 (H) 0.00 - 0.50 mg/dL Final   04/19/2021 0.31 0.00 - 0.50 mg/dL Final     Lactate   Date Value Ref Range Status   05/18/2023 1.2 0.5 - 2.0 mmol/L Final   05/17/2023 1.7 0.5 - 2.0 mmol/L Final   05/31/2022 1.5 0.5 - 2.0 mmol/L Final       Temp Readings from Last 1 Encounters:   05/23/23 96.4 °F (35.8 °C) (Temporal)      Lab Results   Component Value Date    VANCOPEAK 30.80 05/21/2023    VANCOTROUGH 19.30 05/21/2023         Culture Results:  Microbiology Results (last 10 days)       Procedure Component Value - Date/Time    Blood Culture - Blood, Hand, Left [[id number]]  (Normal) Collected: 05/17/23 1151    Lab Status: Final result Specimen: Blood from Hand, Left Updated: 05/22/23 1215     Blood Culture No growth at 5 days    Blood Culture - Blood, Arm, Right [[id number]]  (Normal) Collected: 05/17/23 0954    Lab Status: Final result Specimen: Blood from Arm, Right Updated: 05/22/23 1016     Blood Culture No growth at 5 days    COVID-19 and FLU A/B PCR - Swab, Nasopharynx [[id number]]  (Normal) Collected: 05/17/23 0830    Lab Status: Final result Specimen: Swab from Nasopharynx Updated: 05/17/23 0908     COVID19 Not Detected     Influenza A PCR Not Detected     Influenza B PCR Not Detected    Narrative:      Fact sheet for providers: " https://www.fda.gov/media/066604/download    Fact sheet for patients: https://www.fda.gov/media/906631/download    Test performed by PCR.          Current Vancomycin Dose:  1750 mg IVPB every 24 hours, day 7 of therapy.     Pt is also receiving Cefepime    Assessment/Plan:  Reviewed above labs and cultures.   WBC is 14.56, decreasing towards goal. Cr is 0.88, stable. Will continue current dose.  Consult was extended for pt to have another day of therapy due to IV access. Noted today that redness is improving in lower extremity. Pharmacy will continue to monitor renal function and adjust dose accordingly.    Anita Underwood, PharmD   05/23/23 09:13 CDT

## 2023-05-23 NOTE — PROGRESS NOTES
[facility] Medicine Services  INPATIENT PROGRESS NOTE    Length of Stay: 6  Date of Admission: 5/17/2023  Primary Care Physician: Dolly Foss APRN    Subjective   Chief Complaint: Right lower extremity pain and erythema, fever/chills  HPI: Patient states she is feeling okay today.  Fatigued.    As of today, 05/23/23  Review of Systems   Constitutional: Negative for appetite change, chills, fatigue, fever and unexpected weight change.   Respiratory: Negative for cough, choking, chest tightness, shortness of breath and wheezing.    Cardiovascular: Negative for chest pain, palpitations and leg swelling.   Gastrointestinal: Negative for abdominal pain, blood in stool, constipation, diarrhea, nausea and vomiting.   Genitourinary: Positive for difficulty urinating. Negative for dysuria, flank pain and hematuria.   Skin: Positive for color change.   Neurological: Negative for dizziness, seizures, syncope, speech difficulty, weakness, light-headedness, numbness and headaches.   Hematological: Does not bruise/bleed easily.        All pertinent negatives and positives are as above. All other systems have been reviewed and are negative unless otherwise stated.    Objective    Temp:  [96.4 °F (35.8 °C)-98.7 °F (37.1 °C)] 98.7 °F (37.1 °C)  Heart Rate:  [62-75] 67  Resp:  [18-19] 18  BP: (104-128)/(54-60) 105/54    AM-PAC 6 Clicks Score (PT): 10 (05/23/23 0830)    As of today, 05/23/23  Physical Exam  Vitals reviewed.   Constitutional:       Appearance: She is well-developed.   HENT:      Head: Normocephalic and atraumatic.   Eyes:      Pupils: Pupils are equal, round, and reactive to light.   Cardiovascular:      Rate and Rhythm: Normal rate and regular rhythm.      Heart sounds: Normal heart sounds. No murmur heard.    No friction rub. No gallop.   Pulmonary:      Effort: Pulmonary effort is normal. No respiratory distress.      Breath sounds: Normal breath sounds. No  wheezing or rales.   Chest:      Chest wall: No tenderness.   Abdominal:      General: Bowel sounds are normal. There is no distension.      Palpations: Abdomen is soft.      Tenderness: There is no abdominal tenderness. There is no guarding.   Musculoskeletal:      Cervical back: Normal range of motion and neck supple.      Comments: Left BKA   Skin:     General: Skin is warm and dry.      Comments: Right lower extremity erythema improving   Psychiatric:         Behavior: Behavior normal.         Thought Content: Thought content normal.       Results Review:  I have reviewed the labs, radiology results, and diagnostic studies.    Laboratory Data:   Results from last 7 days   Lab Units 05/23/23  0552 05/22/23  0600 05/21/23  1851 05/21/23  0605 05/20/23  2021 05/20/23  0625 05/19/23  0246   SODIUM mmol/L 139 135*  --  136  --  136 131*   POTASSIUM mmol/L 3.4* 3.9 4.4 3.6   < > 3.0* 3.7   CHLORIDE mmol/L 96* 97*  --  98  --  98 96*   CO2 mmol/L 34.0* 27.0  --  28.0  --  27.0 23.0   BUN mg/dL 10 9  --  10  --  12 15   CREATININE mg/dL 0.88 0.90  --  0.98  --  1.15* 1.12*   GLUCOSE mg/dL 156* 228*  --  236*  --  198* 181*   CALCIUM mg/dL 9.7 9.1  --  8.9  --  8.8 8.5*   BILIRUBIN mg/dL  --   --   --  0.6  --  0.6 0.6   ALK PHOS U/L  --   --   --  147*  --  105 90   ALT (SGPT) U/L  --   --   --  12  --  12 13   AST (SGOT) U/L  --   --   --  18  --  15 16   ANION GAP mmol/L 9.0 11.0  --  10.0  --  11.0 12.0    < > = values in this interval not displayed.     Estimated Creatinine Clearance: 95.3 mL/min (by C-G formula based on SCr of 0.88 mg/dL).  Results from last 7 days   Lab Units 05/21/23  0605 05/17/23  0954   MAGNESIUM mg/dL 1.8 1.6         Results from last 7 days   Lab Units 05/23/23  0552 05/22/23  0600 05/21/23  0605 05/20/23  0625 05/19/23  0246   WBC 10*3/mm3 14.56* 14.84* 16.28* 17.18* 19.52*   HEMOGLOBIN g/dL 11.7* 11.8* 10.9* 10.7* 11.5*   HEMATOCRIT % 35.3 34.2 33.1* 32.6* 35.1   PLATELETS 10*3/mm3 506*  401 395 308 207           Culture Data:   No results found for: BLOODCX  No results found for: URINECX  No results found for: RESPCX  No results found for: WOUNDCX  No results found for: STOOLCX  No components found for: BODYFLD    Radiology Data:   Imaging Results (Last 24 Hours)     ** No results found for the last 24 hours. **          I have utilized all available immediate resources to obtain, update, or review the patient's current medications (including all prescriptions, over-the-counter products, herbals, cannabis/cannabidiol products, and vitamin/mineral/dietary (nutritional) supplements).       Assessment/Plan     Active Hospital Problems    Diagnosis    • **Sepsis without acute organ dysfunction, due to unspecified organism        Plan:  1.  Right lower extremity cellulitis: Continues to improve.  Continue empiric antibiotics to complete course.  2.  Urinary retention: Appreciate urology help.  Plan cystoscopy.  3.  Hypertension: Well-controlled.  4.  Coronary artery disease: Continue home medication.  5.  Diabetes mellitus: Glucose readings better after the addition of a.m. Levemir.  6.  DVT prophylaxis: Heparin.      Medical Decision Making  Number and Complexity of problems: Several highly complex medical problems    Conditions and Status:        Condition is improving.       Discussed with: Patient and      Treatment Plan  As above    Care Planning  Shared decision making: Patient in agreement with plan of care  Code status and discussions: Full code    Disposition  Social Determinants of Health that impact treatment or disposition: None  I expect the patient to be discharged to home in 1-2 days.       The patient was evaluated during the global COVID-19 pandemic, and the diagnosis was suspected/considered upon their initial presentation.  Evaluation, treatment, and testing were consistent with current guidelines for patients who present with complaints or symptoms that may be related to  COVID-19.    I confirmed that the patient's Advance Care Plan is present, code status is documented, or surrogate decision maker is listed in the patient's medical record.        This document has been electronically signed by Mahin Esteves MD on May 23, 2023 12:34 CDT

## 2023-05-23 NOTE — PLAN OF CARE
Goal Outcome Evaluation:  Plan of Care Reviewed With: patient           Outcome Evaluation: OT tx complete, patient alert x 4, very cooperative. Supine<>sit with modified independence. Grooming performed EOB with set up. 20 mins of EOB tolerated this session. 3 sets x 10 reps of BUE exercises 3 lbs dowel. Patient agreeable for standing trial. x1 stand wiht CGA and RW. Patient tolerated 40 seconds of static standing, on RLE (no prosthetic present for LLE). Patient encouraged to sit in chair, but deferring at this time, but patient presents as she is ready for transfers at this time, will progress as patient allows. Returned to bed, all needs in reach. Patient progressing well. 1 goal met this date.

## 2023-05-23 NOTE — PLAN OF CARE
Goal Outcome Evaluation:  Plan of Care Reviewed With: patient        Progress: improving  Outcome Evaluation: vss, antibiotics infusing, redness decreased in right lower extremity, denies pain, prn med given for anxiety x1, O2 weaned down to 4L, pt resting between care

## 2023-05-23 NOTE — CONSULTS
Adult Nutrition  Assessment    Patient Name:  Ariana Martinez  YOB: 1960  MRN: [id number]  Admit Date:  5/17/2023    Assessment Date:  5/23/2023    Comments:  64yo female assessed by RD for LOS. Pt admit w/ RLE cellulitis, MD notes improvement w/ IV abx. Pt is being seen by uroloy for urinary retention. Pt notes to have wounds x2 on toes of right foot, partial amputation of left foot as well as hx of DM, CHF, CABG. ADA diet, intakes >75% last few days. Epic notes wts in Mar/April 2023- 255# and # w/ BMI 43.1 nd 2+ edema all extremities.  Lasix and levemir in place. Alb 2.8L Last BM noted 5/14 in flowsheet and bowel regimen initiated on 5/20. Attempted to speak w/ pt x2- was on phone and not interested in speaking w/ RD. Alerted RN of possible lack of BM- she confirmed later w/ pt of no BM and gave prn meds for bowel movement. RD has no nutritional concerns at this time. RD to follow hospital course.         Electronically signed by:  Juany Rai RD  05/23/23 16:28 CDT

## 2023-05-23 NOTE — PROGRESS NOTES
"   LOS: 6 days   Patient Care Team:  Dolly Foss APRN as PCP - General (Family Medicine)  Uziel Alonso MD as Consulting Physician (Hematology and Oncology)  Elvis Gamboa APRN as Nurse Practitioner (Endocrinology)  Teressa Hammond APRN as Nurse Practitioner (Oncology)    Subjective     Subjective:  Symptoms:  Stable.        History taken from: patient chart    Objective     Vital Signs  Temp:  [96.4 °F (35.8 °C)-98.7 °F (37.1 °C)] 98.7 °F (37.1 °C)  Heart Rate:  [62-75] 67  Resp:  [18-19] 18  BP: (104-128)/(54-60) 105/54    Objective:  General Appearance:  In no acute distress.    Vital signs: (most recent): Blood pressure 105/54, pulse 67, temperature 98.7 °F (37.1 °C), temperature source Temporal, resp. rate 18, height 172.7 cm (68\"), weight 129 kg (284 lb), SpO2 97 %, not currently breastfeeding.  Vital signs are normal.  No fever.    Output: Producing urine.    Lungs:  Normal effort and normal respiratory rate.    Abdomen: Abdomen is soft and non-distended.  There is no abdominal tenderness.     Neurological: Patient is alert.    Pupils:  Pupils are equal, round, and reactive to light.    Skin:  Warm and dry.              Results Review:    Lab Results (last 24 hours)     Procedure Component Value Units Date/Time    POC Glucose Once [[id number]]  (Abnormal) Collected: 05/23/23 1100    Specimen: Blood Updated: 05/23/23 1201     Glucose 165 mg/dL      Comment: RN NotifiedOperator: [id number] ANAMARIA LOPEZMeter ID: [id number]       Basic Metabolic Panel [[id number]]  (Abnormal) Collected: 05/23/23 0552    Specimen: Blood Updated: 05/23/23 0629     Glucose 156 mg/dL      BUN 10 mg/dL      Creatinine 0.88 mg/dL      Sodium 139 mmol/L      Potassium 3.4 mmol/L      Chloride 96 mmol/L      CO2 34.0 mmol/L      Calcium 9.7 mg/dL      BUN/Creatinine Ratio 11.4     Anion Gap 9.0 mmol/L      eGFR 74.9 mL/min/1.73     Narrative:      GFR Normal >60  Chronic Kidney Disease <60  Kidney Failure <15      CBC " Auto Differential [[id number]]  (Abnormal) Collected: 05/23/23 0552    Specimen: Blood Updated: 05/23/23 0613     WBC 14.56 10*3/mm3      RBC 4.12 10*6/mm3      Hemoglobin 11.7 g/dL      Hematocrit 35.3 %      MCV 85.7 fL      MCH 28.4 pg      MCHC 33.1 g/dL      RDW 13.3 %      RDW-SD 41.6 fl      MPV 9.3 fL      Platelets 506 10*3/mm3      Neutrophil % 69.2 %      Lymphocyte % 15.7 %      Monocyte % 6.6 %      Eosinophil % 2.6 %      Basophil % 0.2 %      Immature Grans % 5.7 %      Neutrophils, Absolute 10.07 10*3/mm3      Lymphocytes, Absolute 2.29 10*3/mm3      Monocytes, Absolute 0.96 10*3/mm3      Eosinophils, Absolute 0.38 10*3/mm3      Basophils, Absolute 0.03 10*3/mm3      Immature Grans, Absolute 0.83 10*3/mm3      nRBC 0.0 /100 WBC     POC Glucose Once [[id number]]  (Abnormal) Collected: 05/22/23 1921    Specimen: Blood Updated: 05/22/23 1934     Glucose 260 mg/dL      Comment: RN NotifiedOperator: [id number] [place] JENNIFERMeter ID: [id number]       POC Glucose Once [[id number]]  (Abnormal) Collected: 05/22/23 1650    Specimen: Blood Updated: 05/22/23 1712     Glucose 219 mg/dL      Comment: RN NotifiedOperator: [id number] [facility] HEATHERMeter ID: [id number]            Imaging Results (Last 24 Hours)     ** No results found for the last 24 hours. **           I reviewed the patient's new clinical results.  I reviewed the patient's new imaging results and agree with the interpretation.  I reviewed the patient's other test results and agree with the interpretation      Assessment & Plan       Sepsis without acute organ dysfunction, due to unspecified organism      Assessment & Plan    Consulted to Urology for retention of urine, Jose has been anchored, blood culture no growth, UA negative for signs of UTI. Being treated for skin/soft tissue infection. Hemoglobin A1C 9.8, TSH 1.17 but last month 6.76,   Estimated Creatinine Clearance: 95.3 mL/min (by C-G formula based on SCr of 0.88  mg/dL).    Plan:  Continue antibiotic and Jose  Plan cystoscopy in the upcoming days    BEBE Jang  05/23/23  12:26 CDT

## 2023-05-24 ENCOUNTER — APPOINTMENT (OUTPATIENT)
Dept: GENERAL RADIOLOGY | Facility: HOSPITAL | Age: 63
DRG: 872 | End: 2023-05-24
Payer: COMMERCIAL

## 2023-05-24 LAB
ANION GAP SERPL CALCULATED.3IONS-SCNC: 12 MMOL/L (ref 5–15)
BASOPHILS # BLD AUTO: 0.03 10*3/MM3 (ref 0–0.2)
BASOPHILS NFR BLD AUTO: 0.2 % (ref 0–1.5)
BUN SERPL-MCNC: 10 MG/DL (ref 8–23)
BUN/CREAT SERPL: 10.4 (ref 7–25)
CALCIUM SPEC-SCNC: 9.2 MG/DL (ref 8.6–10.5)
CHLORIDE SERPL-SCNC: 95 MMOL/L (ref 98–107)
CO2 SERPL-SCNC: 31 MMOL/L (ref 22–29)
CREAT SERPL-MCNC: 0.96 MG/DL (ref 0.57–1)
DEPRECATED RDW RBC AUTO: 42 FL (ref 37–54)
EGFRCR SERPLBLD CKD-EPI 2021: 67.5 ML/MIN/1.73
EOSINOPHIL # BLD AUTO: 0.34 10*3/MM3 (ref 0–0.4)
EOSINOPHIL NFR BLD AUTO: 2.6 % (ref 0.3–6.2)
ERYTHROCYTE [DISTWIDTH] IN BLOOD BY AUTOMATED COUNT: 13.3 % (ref 12.3–15.4)
GLUCOSE BLDC GLUCOMTR-MCNC: 182 MG/DL (ref 70–130)
GLUCOSE BLDC GLUCOMTR-MCNC: 205 MG/DL (ref 70–130)
GLUCOSE BLDC GLUCOMTR-MCNC: 207 MG/DL (ref 70–130)
GLUCOSE BLDC GLUCOMTR-MCNC: 269 MG/DL (ref 70–130)
GLUCOSE BLDC GLUCOMTR-MCNC: 304 MG/DL (ref 70–130)
GLUCOSE SERPL-MCNC: 244 MG/DL (ref 65–99)
HCT VFR BLD AUTO: 36.5 % (ref 34–46.6)
HGB BLD-MCNC: 12 G/DL (ref 12–15.9)
IMM GRANULOCYTES # BLD AUTO: 0.61 10*3/MM3 (ref 0–0.05)
IMM GRANULOCYTES NFR BLD AUTO: 4.6 % (ref 0–0.5)
LYMPHOCYTES # BLD AUTO: 1.82 10*3/MM3 (ref 0.7–3.1)
LYMPHOCYTES NFR BLD AUTO: 13.8 % (ref 19.6–45.3)
MCH RBC QN AUTO: 28.6 PG (ref 26.6–33)
MCHC RBC AUTO-ENTMCNC: 32.9 G/DL (ref 31.5–35.7)
MCV RBC AUTO: 86.9 FL (ref 79–97)
MONOCYTES # BLD AUTO: 0.91 10*3/MM3 (ref 0.1–0.9)
MONOCYTES NFR BLD AUTO: 6.9 % (ref 5–12)
NEUTROPHILS NFR BLD AUTO: 71.9 % (ref 42.7–76)
NEUTROPHILS NFR BLD AUTO: 9.51 10*3/MM3 (ref 1.7–7)
NRBC BLD AUTO-RTO: 0 /100 WBC (ref 0–0.2)
PLATELET # BLD AUTO: 491 10*3/MM3 (ref 140–450)
PMV BLD AUTO: 9.7 FL (ref 6–12)
POTASSIUM SERPL-SCNC: 4.3 MMOL/L (ref 3.5–5.2)
RBC # BLD AUTO: 4.2 10*6/MM3 (ref 3.77–5.28)
SODIUM SERPL-SCNC: 138 MMOL/L (ref 136–145)
WBC NRBC COR # BLD: 13.22 10*3/MM3 (ref 3.4–10.8)

## 2023-05-24 PROCEDURE — 85025 COMPLETE CBC W/AUTO DIFF WBC: CPT | Performed by: HOSPITALIST

## 2023-05-24 PROCEDURE — 63710000001 INSULIN ASPART PER 5 UNITS: Performed by: HOSPITALIST

## 2023-05-24 PROCEDURE — 25010000002 HEPARIN (PORCINE) PER 1000 UNITS: Performed by: HOSPITALIST

## 2023-05-24 PROCEDURE — 80048 BASIC METABOLIC PNL TOTAL CA: CPT | Performed by: INTERNAL MEDICINE

## 2023-05-24 PROCEDURE — 97110 THERAPEUTIC EXERCISES: CPT

## 2023-05-24 PROCEDURE — 82948 REAGENT STRIP/BLOOD GLUCOSE: CPT

## 2023-05-24 PROCEDURE — 97530 THERAPEUTIC ACTIVITIES: CPT

## 2023-05-24 PROCEDURE — 74018 RADEX ABDOMEN 1 VIEW: CPT

## 2023-05-24 PROCEDURE — 63710000001 INSULIN DETEMIR PER 5 UNITS: Performed by: HOSPITALIST

## 2023-05-24 PROCEDURE — 63710000001 INSULIN DETEMIR PER 5 UNITS: Performed by: INTERNAL MEDICINE

## 2023-05-24 PROCEDURE — 25010000002 VANCOMYCIN 10 G RECONSTITUTED SOLUTION: Performed by: HOSPITALIST

## 2023-05-24 PROCEDURE — 99231 SBSQ HOSP IP/OBS SF/LOW 25: CPT | Performed by: NURSE PRACTITIONER

## 2023-05-24 PROCEDURE — 63710000001 ONDANSETRON PER 8 MG: Performed by: HOSPITALIST

## 2023-05-24 PROCEDURE — 0 CEFEPIME PER 500 MG: Performed by: INTERNAL MEDICINE

## 2023-05-24 RX ADMIN — CEFEPIME 2 G: 2 INJECTION, POWDER, FOR SOLUTION INTRAVENOUS at 04:35

## 2023-05-24 RX ADMIN — ATORVASTATIN CALCIUM 80 MG: 40 TABLET, FILM COATED ORAL at 08:49

## 2023-05-24 RX ADMIN — Medication 10 ML: at 20:26

## 2023-05-24 RX ADMIN — ISOSORBIDE MONONITRATE 30 MG: 30 TABLET, EXTENDED RELEASE ORAL at 08:49

## 2023-05-24 RX ADMIN — VENLAFAXINE HYDROCHLORIDE 75 MG: 75 CAPSULE, EXTENDED RELEASE ORAL at 08:49

## 2023-05-24 RX ADMIN — SUCRALFATE 1 G: 1 TABLET ORAL at 08:48

## 2023-05-24 RX ADMIN — HEPARIN SODIUM 5000 UNITS: 5000 INJECTION INTRAVENOUS; SUBCUTANEOUS at 17:13

## 2023-05-24 RX ADMIN — HYDROCHLOROTHIAZIDE 12.5 MG: 12.5 TABLET ORAL at 08:49

## 2023-05-24 RX ADMIN — AMLODIPINE BESYLATE 5 MG: 5 TABLET ORAL at 08:49

## 2023-05-24 RX ADMIN — HYDROCODONE BITARTRATE AND ACETAMINOPHEN 1 TABLET: 7.5; 325 TABLET ORAL at 00:07

## 2023-05-24 RX ADMIN — GABAPENTIN 200 MG: 100 CAPSULE ORAL at 08:48

## 2023-05-24 RX ADMIN — INSULIN ASPART 8 UNITS: 100 INJECTION, SOLUTION INTRAVENOUS; SUBCUTANEOUS at 08:49

## 2023-05-24 RX ADMIN — SUCRALFATE 1 G: 1 TABLET ORAL at 20:25

## 2023-05-24 RX ADMIN — HEPARIN SODIUM 5000 UNITS: 5000 INJECTION INTRAVENOUS; SUBCUTANEOUS at 05:08

## 2023-05-24 RX ADMIN — ARIPIPRAZOLE 5 MG: 5 TABLET ORAL at 08:49

## 2023-05-24 RX ADMIN — INSULIN ASPART 10 UNITS: 100 INJECTION, SOLUTION INTRAVENOUS; SUBCUTANEOUS at 11:18

## 2023-05-24 RX ADMIN — LORAZEPAM 0.5 MG: 0.5 TABLET ORAL at 02:51

## 2023-05-24 RX ADMIN — Medication 10 ML: at 08:53

## 2023-05-24 RX ADMIN — HYDROCODONE BITARTRATE AND ACETAMINOPHEN 1 TABLET: 7.5; 325 TABLET ORAL at 17:14

## 2023-05-24 RX ADMIN — RANOLAZINE 500 MG: 500 TABLET, FILM COATED, EXTENDED RELEASE ORAL at 08:49

## 2023-05-24 RX ADMIN — LEVOTHYROXINE SODIUM 50 MCG: 50 TABLET ORAL at 05:08

## 2023-05-24 RX ADMIN — INSULIN DETEMIR 45 UNITS: 100 INJECTION, SOLUTION SUBCUTANEOUS at 20:31

## 2023-05-24 RX ADMIN — CLOPIDOGREL BISULFATE 75 MG: 75 TABLET ORAL at 08:48

## 2023-05-24 RX ADMIN — POLYETHYLENE GLYCOL 3350 17 G: 17 POWDER, FOR SOLUTION ORAL at 09:00

## 2023-05-24 RX ADMIN — ACETAMINOPHEN 650 MG: 325 TABLET, FILM COATED ORAL at 20:26

## 2023-05-24 RX ADMIN — ASPIRIN 325 MG: 325 TABLET, COATED ORAL at 08:49

## 2023-05-24 RX ADMIN — PANTOPRAZOLE SODIUM 40 MG: 40 TABLET, DELAYED RELEASE ORAL at 08:48

## 2023-05-24 RX ADMIN — SUCRALFATE 1 G: 1 TABLET ORAL at 17:13

## 2023-05-24 RX ADMIN — INSULIN ASPART 3 UNITS: 100 INJECTION, SOLUTION INTRAVENOUS; SUBCUTANEOUS at 17:14

## 2023-05-24 RX ADMIN — FUROSEMIDE 40 MG: 40 TABLET ORAL at 08:49

## 2023-05-24 RX ADMIN — RANOLAZINE 500 MG: 500 TABLET, FILM COATED, EXTENDED RELEASE ORAL at 20:26

## 2023-05-24 RX ADMIN — CEFEPIME 2 G: 2 INJECTION, POWDER, FOR SOLUTION INTRAVENOUS at 11:18

## 2023-05-24 RX ADMIN — VANCOMYCIN HYDROCHLORIDE 1750 MG: 10 INJECTION, POWDER, LYOPHILIZED, FOR SOLUTION INTRAVENOUS at 11:18

## 2023-05-24 RX ADMIN — FOLIC ACID 1000 MCG: 1 TABLET ORAL at 08:48

## 2023-05-24 RX ADMIN — ONDANSETRON HYDROCHLORIDE 4 MG: 4 TABLET, FILM COATED ORAL at 17:14

## 2023-05-24 RX ADMIN — SUCRALFATE 1 G: 1 TABLET ORAL at 11:18

## 2023-05-24 RX ADMIN — TAMSULOSIN HYDROCHLORIDE 0.4 MG: 0.4 CAPSULE ORAL at 08:49

## 2023-05-24 RX ADMIN — GABAPENTIN 200 MG: 100 CAPSULE ORAL at 20:25

## 2023-05-24 RX ADMIN — DOCUSATE SODIUM 50 MG AND SENNOSIDES 8.6 MG 2 TABLET: 8.6; 5 TABLET, FILM COATED ORAL at 20:25

## 2023-05-24 RX ADMIN — INSULIN DETEMIR 20 UNITS: 100 INJECTION, SOLUTION SUBCUTANEOUS at 08:49

## 2023-05-24 RX ADMIN — ACETAMINOPHEN 650 MG: 325 TABLET, FILM COATED ORAL at 04:35

## 2023-05-24 RX ADMIN — DOCUSATE SODIUM 50 MG AND SENNOSIDES 8.6 MG 2 TABLET: 8.6; 5 TABLET, FILM COATED ORAL at 08:48

## 2023-05-24 RX ADMIN — HEPARIN SODIUM 5000 UNITS: 5000 INJECTION INTRAVENOUS; SUBCUTANEOUS at 20:25

## 2023-05-24 RX ADMIN — BISACODYL 5 MG: 5 TABLET, COATED ORAL at 20:26

## 2023-05-24 NOTE — PLAN OF CARE
Goal Outcome Evaluation:  Plan of Care Reviewed With: patient        Progress: improving  Outcome Evaluation: VSS, 4L NC, adequate urinary output in Jose catheter, dressings CDI, c/o pain and anxiety managed with PRN meds per orders per pt request, no other complaints, resting in between care.

## 2023-05-24 NOTE — SIGNIFICANT NOTE
05/24/23 1247   OTHER   Discipline occupational therapist   Rehab Time/Intention   Session Not Performed patient unavailable for treatment  (OT tx attempted x3 this date. Pt DUSTIN for first attempt. Lunch arrived and pt wanting to eat second attempt. Pt with PT on third attempt. OT will check back as able.)   Therapy Assessment/Plan (PT)   Criteria for Skilled Interventions Met (PT) yes;meets criteria;skilled treatment is necessary

## 2023-05-24 NOTE — PLAN OF CARE
Goal Outcome Evaluation:  Plan of Care Reviewed With: patient        Progress: improving  Outcome Evaluation: Pt resting in bed at this time, no falls noted, no new skin injuries, continues with antibiotic therapy with no adse noted,

## 2023-05-24 NOTE — PROGRESS NOTES
[facility]  INPATIENT WOUND & OSTOMY CARE    PROGRESS NOTE    Today's Date: 05/24/23    Patient Name: Ariana Martinez  MRN: [id number]  CSN: [id number]  PCP: Dolly Foss APRN  Referring Provider: Mahin Esteves MD  Attending Provider: Santy Rodriguez*  Length of Stay: 7    SUBJECTIVE   Chief Complaint: wounds    HPI: Patient sitting up in bed. I was consulted to look at a blister to right lower extremity that had popped and was draining. At this time, it appears dry and no drainage to area is noted. Patient does have erythema and swelling to RLE. Is currently being treated for cellulitis.    5/18 - No updated vascular studies available for review at this time.  Patient has wound to right fifth toe measuring 0.5 cm x 0.5 cm.  Wound is not open at this time and is dry and stable.  Patient also has wound to right second toe measuring 0.5 cm x 0.5 cm of unstageable eschar.  Patient also has ulcer noted underneath this area measuring 1 cm x 1 cm that is open and draining.  All wounds present on admission.      Visit Dx:    ICD-10-CM ICD-9-CM   1. Sepsis without acute organ dysfunction, due to unspecified organism  A41.9 038.9     995.91   2. Cellulitis of right lower extremity  L03.115 682.6   3. Type 2 diabetes mellitus with hyperglycemia, unspecified whether long term insulin use  E11.65 250.00   4. Impaired mobility and activities of daily living  Z74.09 V49.89    Z78.9    5. Impaired physical mobility  Z74.09 781.99   6. Impaired mobility and ADLs  Z74.09 V49.89    Z78.9        Hospital Problem List:     Sepsis without acute organ dysfunction, due to unspecified organism      History:   Past Medical History:   Diagnosis Date   • Acute bacterial endocarditis 3/13/2021   • Angina, class IV    • Anxiety    • Anxiety and depression    • Arthritis    • Benign paroxysmal positional vertigo    • Bladder disorder     has bladder stimulator   • Carpal tunnel syndrome    • CHF  (congestive heart failure)    • Chronic pain    • Coronary atherosclerosis     hx CABG 2005.  is followed by Dr Kwon   • Depression    • Diabetes mellitus     Type 2, controlled   • Diabetic polyneuropathy    • Disease of thyroid gland    • Elevated cholesterol    • Female stress incontinence    • Foot pain, left    • Full dentures    • Gastroparesis    • GERD (gastroesophageal reflux disease)    • Hyperlipidemia    • Hypertension    • Low back pain    • Malaise and fatigue    • Multiple joint pain    • Obesity     Refuses to be weighed   • Occlusion and stenosis of bilateral carotid arteries 6/18/2021   • Otalgia     Both   • Perforation of tympanic membrane     Left   • Postoperative wound infection    • Vitamin D deficiency    • Wears glasses     reading     Past Surgical History:   Procedure Laterality Date   • ABDOMINAL SURGERY     • AMPUTATION FOOT     • ANGIOPLASTY      coronary   • ANKLE ARTHROSCOPY Left 02/26/2021    Procedure: Left foot hardware removal, ankle arthroscopy, bone grafting , left foot exostectomy;  Surgeon: Ignacio Lord DPM;  Location: [facility] OR;  Service: Podiatry;  Laterality: Left;   • BREAST BIOPSY Right    • CARDIAC CATHETERIZATION     • CARDIAC CATHETERIZATION N/A 06/20/2017    Procedure: Right Heart Cath;  Surgeon: Can Kwon MD PhD;  Location: [facility] CATH INVASIVE LOCATION;  Service:    • CARDIAC CATHETERIZATION N/A 02/18/2020    Procedure: Left Heart Cath;  Surgeon: Catalina Cooper MD;  Location: [facility] CATH INVASIVE LOCATION;  Service: Cardiology;  Laterality: N/A;   • CARDIAC CATHETERIZATION N/A 04/06/2022    Procedure: Left Heart Cath;  Surgeon: Sheryl Navas MD;  Location: [facility] CATH INVASIVE LOCATION;  Service: Cardiology;  Laterality: N/A;   • CARPAL TUNNEL RELEASE     • CHOLECYSTECTOMY     • COLONOSCOPY N/A 06/24/2020    Procedure: COLONOSCOPY;  Surgeon: Julián Maldonado MD;  Location: [facility] ENDOSCOPY;  Service: Gastroenterology;  Laterality:  N/A;   • CORONARY ARTERY BYPASS GRAFT  2005   • ENDOSCOPY N/A 10/19/2018    Procedure: ESOPHAGOGASTRODUODENOSCOPY possible dilation;  Surgeon: Julián Maldonado MD;  Location: [facility] ENDOSCOPY;  Service: Gastroenterology   • ENDOSCOPY N/A 06/24/2020    Procedure: ESOPHAGOGASTRODUODENOSCOPY WED appt please;  Surgeon: Julián Maldonado MD;  Location: [facility] ENDOSCOPY;  Service: Gastroenterology;  Laterality: N/A;   • ENDOSCOPY N/A 06/10/2022    Procedure: ESOPHAGOGASTRODUODENOSCOPY   room 380;  Surgeon: Jeremiah Wilkins MD;  Location: [facility] ENDOSCOPY;  Service: Gastroenterology;  Laterality: N/A;   • ENDOSCOPY N/A 1/10/2023    Procedure: ESOPHAGOGASTRODUODENOSCOPY;  Surgeon: Jeremiah Wilkins MD;  Location: [facility] ENDOSCOPY;  Service: Gastroenterology;  Laterality: N/A;   • ENDOSCOPY AND COLONOSCOPY     • FOOT SURGERY      Toes   • FOOT SURGERY     • GASTRIC BANDING      Revision, laparoscopic   • HYSTERECTOMY     • INCISION AND DRAINAGE LEG Left 03/12/2021    Procedure: Left ankle arthroscopic irrigation and debridement, screw removal;  Surgeon: Ignacio Lord DPM;  Location: [facility] OR;  Service: Podiatry;  Laterality: Left;   • MOUTH SURGERY     • SALPINGO OOPHORECTOMY     • SHOULDER SURGERY     • SUBTALAR ARTHRODESIS Left 01/16/2019    Procedure: LEFT FOOT HARDWARE REMOVAL, FIFTH METATARSAL , OPEN REDUCTION INTERNAL FIXATION, CALCANEAL OSTEOTOMY;  Surgeon: Ignacio Lord DPM;  Location: [facility] OR;  Service: Podiatry   • SUBTALAR ARTHRODESIS Left 10/16/2019    Procedure: foot hardware removal, subtalar joint fusion  possible de/reattachment of achilles tendon        (c-arm);  Surgeon: Ignacio oLrd DPM;  Location: [facility] OR;  Service: Podiatry   • SUBTALAR ARTHRODESIS Left 09/30/2020    Procedure: subtalar, talonavicular joint arthrodesis.  Removal hardware.          (c-arm);  Surgeon: Ignacio Lord DPM;  Location: [facility] OR;  Service: Podiatry;  Laterality: Left;   • TRANSESOPHAGEAL  ECHOCARDIOGRAM (LAMONTE)      With color flow     Social History     Socioeconomic History   • Marital status:    Tobacco Use   • Smoking status: Never   • Smokeless tobacco: Never   Vaping Use   • Vaping Use: Never used   Substance and Sexual Activity   • Alcohol use: No   • Drug use: No   • Sexual activity: Defer       Allergies:  Allergies   Allergen Reactions   • Seroquel [Quetiapine] Anaphylaxis   • Avandia [Rosiglitazone] Swelling   • Morphine And Related Hallucinations   • Oxycodone Hallucinations       Medications:    Current Facility-Administered Medications:   •  acetaminophen (TYLENOL) tablet 650 mg, 650 mg, Oral, Q4H PRN, Reji Reji R, DO, 650 mg at 05/24/23 0435  •  aluminum-magnesium hydroxide-simethicone (MAALOX MAX) 400-400-40 MG/5ML suspension 15 mL, 15 mL, Oral, Q6H PRN, Reji, Reji R, DO  •  amLODIPine (NORVASC) tablet 5 mg, 5 mg, Oral, Daily, Reji Reji R, DO, 5 mg at 05/24/23 0849  •  ARIPiprazole (ABILIFY) tablet 5 mg, 5 mg, Oral, Daily, Reji, Reji R, DO, 5 mg at 05/24/23 0849  •  aspirin EC tablet 325 mg, 325 mg, Oral, Daily, Reji, Reji R, DO, 325 mg at 05/24/23 0849  •  atorvastatin (LIPITOR) tablet 80 mg, 80 mg, Oral, Daily, Reji, Reji R, DO, 80 mg at 05/24/23 0849  •  sennosides-docusate (PERICOLACE) 8.6-50 MG per tablet 2 tablet, 2 tablet, Oral, BID, 2 tablet at 05/24/23 0848 **AND** polyethylene glycol (MIRALAX) packet 17 g, 17 g, Oral, Daily PRN, 17 g at 05/24/23 0900 **AND** bisacodyl (DULCOLAX) EC tablet 5 mg, 5 mg, Oral, Daily PRN, 5 mg at 05/23/23 1347 **AND** bisacodyl (DULCOLAX) suppository 10 mg, 10 mg, Rectal, Daily PRN, Santy Rodriguez MD  •  cefepime (MAXIPIME) 2 g/100 mL 0.9% NS (mbp), 2 g, Intravenous, Q8H, Santy Rodriguez MD, 2 g at 05/24/23 0435  •  clopidogrel (PLAVIX) tablet 75 mg, 75 mg, Oral, Daily, Reji Mendoza DO, 75 mg at 05/24/23 0848  •  dextrose (D50W) (25 g/50 mL) IV injection  25 g, 25 g, Intravenous, Q15 Min PRN, Santy Rodriguez MD  •  dextrose (GLUTOSE) oral gel 15 g, 15 g, Oral, Q15 Min PRN, Santy Rodriguez MD  •  folic acid (FOLVITE) tablet 1,000 mcg, 1,000 mcg, Oral, Daily, Reji Mendoza DO, 1,000 mcg at 05/24/23 0848  •  furosemide (LASIX) tablet 40 mg, 40 mg, Oral, Daily, Reji Mendoza DO, 40 mg at 05/24/23 0849  •  gabapentin (NEURONTIN) capsule 200 mg, 200 mg, Oral, Q12H, Santy Rodriguez MD, 200 mg at 05/24/23 0848  •  glucagon (human recombinant) (GLUCAGEN DIAGNOSTIC) injection 1 mg, 1 mg, Intramuscular, Q15 Min PRN, Santy Rodriguez MD  •  guaiFENesin-dextromethorphan (ROBITUSSIN DM) 100-10 MG/5ML syrup 5 mL, 5 mL, Oral, Q4H PRN, Santy Rodriguez MD, 5 mL at 05/22/23 2348  •  heparin (porcine) 5000 UNIT/ML injection 5,000 Units, 5,000 Units, Subcutaneous, Q8H, Reji Mendoza DO, 5,000 Units at 05/24/23 0508  •  hydroCHLOROthiazide (HYDRODIURIL) tablet 12.5 mg, 12.5 mg, Oral, Daily, Reji Mendoza DO, 12.5 mg at 05/24/23 0849  •  HYDROcodone-acetaminophen (NORCO) 7.5-325 MG per tablet 1 tablet, 1 tablet, Oral, Q6H PRN, Reji Mendoza DO, 1 tablet at 05/24/23 0007  •  HYDROmorphone (DILAUDID) injection 1 mg, 1 mg, Intravenous, Q2H PRN **AND** naloxone (NARCAN) injection 0.4 mg, 0.4 mg, Intravenous, Q5 Min PRN, Santy Rodriguez MD  •  Insulin Aspart (novoLOG) injection 0-14 Units, 0-14 Units, Subcutaneous, TID AC, Reji Mendoza DO, 8 Units at 05/24/23 0849  •  insulin detemir (LEVEMIR) injection 20 Units, 20 Units, Subcutaneous, Daily, Mahin Esteves MD, 20 Units at 05/24/23 0849  •  insulin detemir (LEVEMIR) injection 45 Units, 45 Units, Subcutaneous, Nightly, Santy Rodriguez MD, 45 Units at 05/22/23 2047  •  isosorbide mononitrate (IMDUR) 24 hr tablet 30 mg, 30 mg, Oral, Daily, Reji Mendoza DO, 30 mg at 05/24/23  0849  •  levothyroxine (SYNTHROID, LEVOTHROID) tablet 50 mcg, 50 mcg, Oral, QAM, Reji Mendoza DO, 50 mcg at 05/24/23 0508  •  LORazepam (ATIVAN) tablet 0.5 mg, 0.5 mg, Oral, Q8H PRN, Reji Mendoza DO, 0.5 mg at 05/24/23 0251  •  Magnesium Standard Dose Replacement - Follow Nurse / BPA Driven Protocol, , Does not apply, PRN, Santy Rodriguez MD  •  metoclopramide (REGLAN) tablet 10 mg, 10 mg, Oral, 4x Daily PRN, Reji Mendoza DO  •  nitroglycerin (NITROSTAT) SL tablet 0.4 mg, 0.4 mg, Sublingual, Q5 Min PRN, Reji Mendoza DO  •  ondansetron (ZOFRAN) injection 4 mg, 4 mg, Intravenous, Q6H PRN, Santy Rodriguez MD  •  ondansetron (ZOFRAN) tablet 4 mg, 4 mg, Oral, Q6H PRN, 4 mg at 05/22/23 1159 **OR** [DISCONTINUED] ondansetron (ZOFRAN) injection 4 mg, 4 mg, Intravenous, Q6H PRN, Reji Mendoza DO  •  pantoprazole (PROTONIX) EC tablet 40 mg, 40 mg, Oral, Daily, Reji Mendoza DO, 40 mg at 05/24/23 0848  •  Pharmacy to dose vancomycin, , Does not apply, Continuous PRN, Reji Mendoza DO  •  ranolazine (RANEXA) 12 hr tablet 500 mg, 500 mg, Oral, Q12H, Reji Mendoza DO, 500 mg at 05/24/23 0849  •  sodium chloride 0.9 % flush 10 mL, 10 mL, Intravenous, PRN, Reji Mendoza DO  •  sodium chloride 0.9 % flush 10 mL, 10 mL, Intravenous, Q12H, Reji Mendoza DO, 10 mL at 05/24/23 0853  •  sodium chloride 0.9 % flush 10 mL, 10 mL, Intravenous, PRN, Reji Mendoza, DO  •  sodium chloride 0.9 % flush 10 mL, 10 mL, Intravenous, Q12H, Santy Rodriguez MD, 10 mL at 05/24/23 0853  •  sodium chloride 0.9 % flush 10 mL, 10 mL, Intravenous, PRN, Santy Rodriguez MD  •  sodium chloride 0.9 % infusion 40 mL, 40 mL, Intravenous, PRN, Reji Mendoza, DO  •  sodium chloride 0.9 % infusion 40 mL, 40 mL, Intravenous, PRN, Santy Rodriguez MD, 40 mL at 05/22/23 1327  •  sucralfate (CARAFATE)  tablet 1 g, 1 g, Oral, 4x Daily, Reji Mendoza DO, 1 g at 05/24/23 0848  •  tamsulosin (FLOMAX) 24 hr capsule 0.4 mg, 0.4 mg, Oral, Daily, Santy Rodriguez MD, 0.4 mg at 05/24/23 0849  •  vancomycin 1750 mg/500 mL 0.9% NS IVPB ([facility]), 1,750 mg, Intravenous, Q24H, Reji Mendoza DO, 1,750 mg at 05/23/23 1112  •  venlafaxine XR (EFFEXOR-XR) 24 hr capsule 75 mg, 75 mg, Oral, Daily With Breakfast, Reji Mendoza DO, 75 mg at 05/24/23 0849        OBJECTIVE     Vitals:    05/24/23 0728   BP: 130/59   Pulse: 78   Resp: 18   Temp: 96.4 °F (35.8 °C)   SpO2: 98%         Physical Exam  Physical Exam  Vitals reviewed. Nursing notes reviewed.  Constitutional:       Appearance: Well-developed.   Skin:     General: Skin is warm and dry.      Coloration: Skin is not pale.      Findings: No erythema or rash. Right toe ulcers. RLE blister, BLE swelling  Neurological:      Mental Status: Pt is alert and oriented to person, place, and time.   Psychiatric:         Behavior: Behavior normal.         Thought Content: Thought content normal.         Judgment: Judgment normal.       Results Review:  Lab Results (last 48 hours)     Procedure Component Value Units Date/Time    Blood Culture - Blood, Hand, Left [[id number]]  (Normal) Collected: 05/17/23 1151    Specimen: Blood from Hand, Left Updated: 05/18/23 1216     Blood Culture No growth at 24 hours    POC Glucose Once [[id number]]  (Abnormal) Collected: 05/18/23 1041    Specimen: Blood Updated: 05/18/23 1106     Glucose 200 mg/dL      Comment: RN NotifiedOperator: [id number] NED Crowley ID: [id number]       Blood Culture - Blood, Arm, Right [[id number]]  (Normal) Collected: 05/17/23 0954    Specimen: Blood from Arm, Right Updated: 05/18/23 1015     Blood Culture No growth at 24 hours    POC Glucose Once [[id number]]  (Abnormal) Collected: 05/18/23 0653    Specimen: Blood Updated: 05/18/23 0723     Glucose 229 mg/dL      Comment: RN  NotifiedOperator: [id number] NED IZQUIERDOMeter ID: [id number]       Comprehensive Metabolic Panel [[id number]]  (Abnormal) Collected: 05/18/23 0556    Specimen: Blood Updated: 05/18/23 0622     Glucose 247 mg/dL      BUN 15 mg/dL      Creatinine 1.10 mg/dL      Sodium 130 mmol/L      Potassium 3.2 mmol/L      Chloride 98 mmol/L      CO2 23.0 mmol/L      Calcium 8.3 mg/dL      Total Protein 5.9 g/dL      Albumin 2.7 g/dL      ALT (SGPT) 11 U/L      AST (SGOT) 12 U/L      Alkaline Phosphatase 84 U/L      Total Bilirubin 0.6 mg/dL      Globulin 3.2 gm/dL      A/G Ratio 0.8 g/dL      BUN/Creatinine Ratio 13.6     Anion Gap 9.0 mmol/L      eGFR 57.3 mL/min/1.73     Narrative:      GFR Normal >60  Chronic Kidney Disease <60  Kidney Failure <15      CBC Auto Differential [[id number]]  (Abnormal) Collected: 05/18/23 0556    Specimen: Blood Updated: 05/18/23 0601     WBC 16.99 10*3/mm3      RBC 3.75 10*6/mm3      Hemoglobin 10.7 g/dL      Hematocrit 31.8 %      MCV 84.8 fL      MCH 28.5 pg      MCHC 33.6 g/dL      RDW 13.3 %      RDW-SD 41.6 fl      MPV 9.6 fL      Platelets 233 10*3/mm3      Neutrophil % 84.0 %      Lymphocyte % 5.8 %      Monocyte % 8.5 %      Eosinophil % 0.1 %      Basophil % 0.7 %      Immature Grans % 0.9 %      Neutrophils, Absolute 14.26 10*3/mm3      Lymphocytes, Absolute 0.99 10*3/mm3      Monocytes, Absolute 1.45 10*3/mm3      Eosinophils, Absolute 0.02 10*3/mm3      Basophils, Absolute 0.12 10*3/mm3      Immature Grans, Absolute 0.15 10*3/mm3      nRBC 0.0 /100 WBC     Lactic Acid, Plasma [[id number]]  (Normal) Collected: 05/18/23 0000    Specimen: Blood Updated: 05/18/23 0019     Lactate 1.2 mmol/L     POC Glucose Once [[id number]]  (Abnormal) Collected: 05/17/23 1924    Specimen: Blood Updated: 05/17/23 2011     Glucose 295 mg/dL      Comment: RN NotifiedOperator: [id number] [facility] ID: [id number]       Potassium [[id number]]  (Normal) Collected: 05/17/23 1728    Specimen:  Blood Updated: 05/17/23 1749     Potassium 4.1 mmol/L      Comment: Slight hemolysis detected by analyzer. Results may be affected.       POC Glucose Once [[id number]]  (Abnormal) Collected: 05/17/23 1617    Specimen: Blood Updated: 05/17/23 1631     Glucose 303 mg/dL      Comment: RN NotifiedOperator: [id number] DACOSTA ALISEMeter ID: [id number]       TSH [[id number]]  (Normal) Collected: 05/17/23 0954    Specimen: Blood Updated: 05/17/23 1504     TSH 1.170 uIU/mL     Hemoglobin A1c [[id number]]  (Abnormal) Collected: 05/17/23 0954    Specimen: Blood Updated: 05/17/23 1456     Hemoglobin A1C 9.80 %     Narrative:      Hemoglobin A1C Ranges:    Increased Risk for Diabetes  5.7% to 6.4%  Diabetes                     >= 6.5%  Diabetic Goal                < 7.0%    Urinalysis, Microscopic Only - Straight Cath [[id number]]  (Abnormal) Collected: 05/17/23 1040    Specimen: Urine from Straight Cath Updated: 05/17/23 1148     RBC, UA 0-2 /HPF      WBC, UA 0-2 /HPF      Comment: Urine culture not indicated.        Bacteria, UA None Seen /HPF      Squamous Epithelial Cells, UA 3-5 /HPF      Hyaline Casts, UA 0-2 /LPF      Fine Granular Casts, UA 7-12 /LPF      Methodology Manual Light Microscopy    POC Glucose Once [[id number]]  (Abnormal) Collected: 05/17/23 0832    Specimen: Blood Updated: 05/17/23 1123     Glucose 319 mg/dL      Comment: RN NotifiedOperator: [id number] PARKINSON LEAMeter ID: [id number]       Urinalysis With Culture If Indicated - Straight Cath [[id number]]  (Abnormal) Collected: 05/17/23 1040    Specimen: Urine from Straight Cath Updated: 05/17/23 1122     Color, UA Yellow     Appearance, UA Cloudy     pH, UA 5.5     Specific Gravity, UA 1.021     Glucose, UA >=1000 mg/dL (3+)     Ketones, UA Trace     Bilirubin, UA Negative     Blood, UA Trace     Protein, UA >=300 mg/dL (3+)     Leuk Esterase, UA Negative     Nitrite, UA Negative     Urobilinogen, UA 1.0 E.U./dL    Narrative:      In absence of clinical  symptoms, the presence of pyuria, bacteria, and/or nitrites on the urinalysis result does not correlate with infection.    [place] Draw [[id number]] Collected: 05/17/23 0954    Specimen: Blood Updated: 05/17/23 1100    Narrative:      The following orders were created for panel order [place] Draw.  Procedure                               Abnormality         Status                     ---------                               -----------         ------                     Green Top (Gel)[[id number]]                                  Final result               Lavender Top[[id number]]                                     Final result               Gold Top - SST[[id number]]                                   Final result               Light Blue Top[[id number]]                                   Final result                 Please view results for these tests on the individual orders.    Green Top (Gel) [[id number]] Collected: 05/17/23 0954    Specimen: Blood Updated: 05/17/23 1100     Extra Tube Hold for add-ons.     Comment: Auto resulted.       Gold Top - SST [[id number]] Collected: 05/17/23 0954    Specimen: Blood Updated: 05/17/23 1100     Extra Tube Hold for add-ons.     Comment: Auto resulted.       Lavender Top [[id number]] Collected: 05/17/23 0954    Specimen: Blood Updated: 05/17/23 1100     Extra Tube hold for add-on     Comment: Auto resulted       Light Blue Top [[id number]] Collected: 05/17/23 0954    Specimen: Blood Updated: 05/17/23 1100     Extra Tube Hold for add-ons.     Comment: Auto resulted       Single High Sensitivity Troponin T [[id number]]  (Abnormal) Collected: 05/17/23 0954    Specimen: Blood Updated: 05/17/23 1043     HS Troponin T 39 ng/L     Narrative:      High Sensitive Troponin T Reference Range:  <10.0 ng/L- Negative Female for AMI  <15.0 ng/L- Negative Male for AMI  >=10 - Abnormal Female indicating possible myocardial injury.  >=15 - Abnormal Male indicating possible myocardial injury.    Clinicians would have to utilize clinical acumen, EKG, Troponin, and serial changes to determine if it is an Acute Myocardial Infarction or myocardial injury due to an underlying chronic condition.         Magnesium [[id number]]  (Normal) Collected: 05/17/23 0954    Specimen: Blood Updated: 05/17/23 1038     Magnesium 1.6 mg/dL     Comprehensive Metabolic Panel [[id number]]  (Abnormal) Collected: 05/17/23 0954    Specimen: Blood Updated: 05/17/23 1038     Glucose 307 mg/dL      BUN 14 mg/dL      Creatinine 1.26 mg/dL      Sodium 128 mmol/L      Potassium 3.3 mmol/L      Chloride 92 mmol/L      CO2 21.0 mmol/L      Calcium 8.5 mg/dL      Total Protein 6.3 g/dL      Albumin 3.0 g/dL      ALT (SGPT) 11 U/L      AST (SGOT) 13 U/L      Alkaline Phosphatase 90 U/L      Total Bilirubin 0.7 mg/dL      Globulin 3.3 gm/dL      A/G Ratio 0.9 g/dL      BUN/Creatinine Ratio 11.1     Anion Gap 15.0 mmol/L      eGFR 48.7 mL/min/1.73     Narrative:      GFR Normal >60  Chronic Kidney Disease <60  Kidney Failure <15      Lactic Acid, Plasma [[id number]]  (Normal) Collected: 05/17/23 0954    Specimen: Blood Updated: 05/17/23 1032     Lactate 1.7 mmol/L     Blood Gas, Arterial - [[id number]]  (Abnormal) Collected: 05/17/23 1026    Specimen: Arterial Blood Updated: 05/17/23 1026     Site Left Radial     Tonio's Test N/A     pH, Arterial 7.451 pH units      Comment: 83 Value above reference range        pCO2, Arterial 36.0 mm Hg      pO2, Arterial 65.7 mm Hg      Comment: 84 Value below reference range        HCO3, Arterial 25.1 mmol/L      Base Excess, Arterial 1.3 mmol/L      O2 Saturation, Arterial 94.8 %      Barometric Pressure for Blood Gas 747 mmHg      Modality Nasal Cannula     Flow Rate 2.0 lpm      Ventilator Mode NA     Collected by DAYA horn     Comment: Meter: [id number]     :  [id number]       CBC & Differential [[id number]]  (Abnormal) Collected: 05/17/23 0954    Specimen: Blood Updated: 05/17/23 1011     Narrative:      The following orders were created for panel order CBC & Differential.  Procedure                               Abnormality         Status                     ---------                               -----------         ------                     CBC Auto Differential[[id number]]        Abnormal            Final result               Scan Slide[[id number]]                                                                    Please view results for these tests on the individual orders.    CBC Auto Differential [[id number]]  (Abnormal) Collected: 05/17/23 0954    Specimen: Blood Updated: 05/17/23 1011     WBC 19.65 10*3/mm3      RBC 4.01 10*6/mm3      Hemoglobin 11.8 g/dL      Hematocrit 34.1 %      MCV 85.0 fL      MCH 29.4 pg      MCHC 34.6 g/dL      RDW 13.3 %      RDW-SD 41.4 fl      MPV 9.8 fL      Platelets 249 10*3/mm3      Neutrophil % 86.8 %      Lymphocyte % 5.1 %      Monocyte % 5.8 %      Eosinophil % 0.1 %      Basophil % 0.7 %      Immature Grans % 1.5 %      Neutrophils, Absolute 17.08 10*3/mm3      Lymphocytes, Absolute 1.01 10*3/mm3      Monocytes, Absolute 1.13 10*3/mm3      Eosinophils, Absolute 0.01 10*3/mm3      Basophils, Absolute 0.13 10*3/mm3      Immature Grans, Absolute 0.29 10*3/mm3      nRBC 0.0 /100 WBC     COVID-19 and FLU A/B PCR - Swab, Nasopharynx [[id number]]  (Normal) Collected: 05/17/23 0830    Specimen: Swab from Nasopharynx Updated: 05/17/23 0908     COVID19 Not Detected     Influenza A PCR Not Detected     Influenza B PCR Not Detected    Narrative:      Fact sheet for providers: https://www.fda.gov/media/488295/download    Fact sheet for patients: https://www.fda.gov/media/115717/download    Test performed by PCR.        Imaging Results (Last 72 Hours)     Procedure Component Value Units Date/Time    US Guided Vascular Access [[id number]] Collected: 05/22/23 0812     Updated: 05/22/23 0815    Narrative:      Ultrasound guidance vascular    FINDINGS:  Real-time ultrasound  imaging was provided for vascular access.  Please refer to  procedure note for real-time exam findings.  The left basilicVein was found to  be patent and compressible.  Access under direct sonographic visualization.  Permanent saved images sent to the PACS for documentation.      IR Insert Midline Without Port Pump 5 Plus [[id number]] Resulted: 05/22/23 0802     Updated: 05/22/23 0802    Narrative:      This procedure was auto-finalized with no dictation required.    US Venous Doppler Upper Extremity Right (duplex) [[id number]] Collected: 05/21/23 1155     Updated: 05/21/23 1159    Narrative:      US VENOUS DOPPLER UPPER EXTREMITY RIGHT (DUPLEX)    HISTORY: RUE swelling and pain    COMPARISON: NONE    FINDINGS:  Transverse and longitudinal grayscale and Doppler sonographic images of the  right upper extremity were obtained. Spectral analysis was also obtained.    There is normal flow and compressibility of the right subclavian, axillary,  brachial, and forearm veins.    Thrombus is noted in the cephalic vein.      Impression:        1. No evidence of DVT in the right upper extremity.    2. Superficial venous thrombosis in the cephalic vein.                 ASSESSMENT/PLAN       Examination and evaluation of wound(s) was performed.    DIAGNOSIS:     Diabetes Mellitus with foot ulcer  Cellulitis, right lower extremity    PLAN:     Patient would benefit from compression therapy after cellulitis is resolved. At this time, apply polymem with kerlix loosely to RLE ulcerations.     Discussed findings and treatment plan including risks, benefits, and treatment options with patient in detail. Patient agreed with treatment plan.          This document has been electronically signed by BEBE Bullock on May 24, 2023 09:19 CDT

## 2023-05-24 NOTE — PROGRESS NOTES
"   LOS: 7 days   Patient Care Team:  Dolly Foss APRN as PCP - General (Family Medicine)  Uziel Alonso MD as Consulting Physician (Hematology and Oncology)  Elvis Gamboa APRN as Nurse Practitioner (Endocrinology)  Teressa Hammond APRN as Nurse Practitioner (Oncology)    Subjective     Subjective:  Symptoms:  Stable.        History taken from: patient chart RN    Objective     Vital Signs  Temp:  [96.4 °F (35.8 °C)-98 °F (36.7 °C)] 98 °F (36.7 °C)  Heart Rate:  [63-79] 74  Resp:  [18] 18  BP: (121-148)/(57-67) 123/57    Objective:  General Appearance:  In no acute distress.    Vital signs: (most recent): Blood pressure 123/57, pulse 74, temperature 98 °F (36.7 °C), temperature source Temporal, resp. rate 18, height 172.7 cm (68\"), weight 129 kg (284 lb), SpO2 98 %, not currently breastfeeding.  Vital signs are normal.  No fever.    Output: Producing urine.    Lungs:  Normal effort and normal respiratory rate.    Abdomen: Abdomen is soft and non-distended.    Pupils:  Pupils are equal, round, and reactive to light.    Skin:  Warm, dry and pale.              Results Review:    Lab Results (last 24 hours)     Procedure Component Value Units Date/Time    POC Glucose Once [[id number]]  (Abnormal) Collected: 05/24/23 0707    Specimen: Blood Updated: 05/24/23 0748     Glucose 269 mg/dL      Comment: RN NotifiedOperator: [id number] ELISABET Kahliler ID: [id number]       Basic Metabolic Panel [[id number]]  (Abnormal) Collected: 05/24/23 0511    Specimen: Blood Updated: 05/24/23 0652     Glucose 244 mg/dL      BUN 10 mg/dL      Creatinine 0.96 mg/dL      Sodium 138 mmol/L      Potassium 4.3 mmol/L      Chloride 95 mmol/L      CO2 31.0 mmol/L      Calcium 9.2 mg/dL      BUN/Creatinine Ratio 10.4     Anion Gap 12.0 mmol/L      eGFR 67.5 mL/min/1.73     Narrative:      GFR Normal >60  Chronic Kidney Disease <60  Kidney Failure <15      CBC Auto Differential [[id number]]  (Abnormal) Collected: 05/24/23 0511    " Specimen: Blood Updated: 05/24/23 0647     WBC 13.22 10*3/mm3      RBC 4.20 10*6/mm3      Hemoglobin 12.0 g/dL      Hematocrit 36.5 %      MCV 86.9 fL      MCH 28.6 pg      MCHC 32.9 g/dL      RDW 13.3 %      RDW-SD 42.0 fl      MPV 9.7 fL      Platelets 491 10*3/mm3      Neutrophil % 71.9 %      Lymphocyte % 13.8 %      Monocyte % 6.9 %      Eosinophil % 2.6 %      Basophil % 0.2 %      Immature Grans % 4.6 %      Neutrophils, Absolute 9.51 10*3/mm3      Lymphocytes, Absolute 1.82 10*3/mm3      Monocytes, Absolute 0.91 10*3/mm3      Eosinophils, Absolute 0.34 10*3/mm3      Basophils, Absolute 0.03 10*3/mm3      Immature Grans, Absolute 0.61 10*3/mm3      nRBC 0.0 /100 WBC     POC Glucose Once [[id number]]  (Abnormal) Collected: 05/23/23 2121    Specimen: Blood Updated: 05/24/23 0513     Glucose 207 mg/dL      Comment: RN NotifiedOperator: [id number] SCARLET MOLINAORYMeter ID: [id number]       Magnesium [[id number]]  (Normal) Collected: 05/23/23 2212    Specimen: Blood Updated: 05/23/23 2232     Magnesium 1.7 mg/dL     Potassium [[id number]]  (Normal) Collected: 05/23/23 2212    Specimen: Blood Updated: 05/23/23 2232     Potassium 3.8 mmol/L     POC Glucose Once [[id number]]  (Abnormal) Collected: 05/23/23 1922    Specimen: Blood Updated: 05/23/23 1953     Glucose 174 mg/dL      Comment: RN NotifiedOperator: [id number] HERBIE MCGRATHBETHMeter ID: [id number]       Magnesium [[id number]]  (Normal) Collected: 05/23/23 1706    Specimen: Blood Updated: 05/23/23 1822     Magnesium 1.6 mg/dL     Potassium [[id number]]  (Normal) Collected: 05/23/23 1706    Specimen: Blood Updated: 05/23/23 1741     Potassium 3.6 mmol/L     POC Glucose Once [[id number]]  (Abnormal) Collected: 05/23/23 1622    Specimen: Blood Updated: 05/23/23 1644     Glucose 135 mg/dL      Comment: RN NotifiedOperator: [id number] ANAMARIA LOPEZMeter ID: [id number]            Imaging Results (Last 24 Hours)     Procedure Component Value Units Date/Time    XR  Abdomen KUB [[id number]] Collected: 05/24/23 1045     Updated: 05/24/23 1053    Narrative:      FINDINGS/    Impression:      Two (2) views of the abdomen and pelvis demonstrate significant stool throughout  the entire colon.    There is a presacral stimulator lead in place.    No other significant finding.  No active disease.           I reviewed the patient's new clinical results.  I reviewed the patient's new imaging results and agree with the interpretation.  I reviewed the patient's other test results and agree with the interpretation      Assessment & Plan       Sepsis without acute organ dysfunction, due to unspecified organism      Assessment & Plan    Consulted to Urology for retention of urine, Jose has been anchored, blood culture no growth, UA negative for signs of UTI. Being treated for skin/soft tissue infection. Hemoglobin A1C 9.8, TSH 1.17 but last month 6.76,   --Estimated Creatinine Clearance: 87.3 mL/min (by C-G formula based on SCr of 0.96 mg/dL).     Plan:  Continue antibiotic and Jose  Plan cystoscopy in 2-3 days    BEBE Jang  05/24/23  12:49 CDT

## 2023-05-24 NOTE — PROGRESS NOTES
"  Pharmacokinetics by Pharmacy - Vancomycin    Ariana Martinez is a 63 y.o. female  [Ht: 172.7 cm (68\"); Wt: 129 kg (284 lb)] is being treated for skin and soft tissue infection, day 8 of therapy.    Estimated Creatinine Clearance: 87.3 mL/min (by C-G formula based on SCr of 0.96 mg/dL).   Creatinine   Date Value Ref Range Status   05/24/2023 0.96 0.57 - 1.00 mg/dL Final   05/23/2023 0.88 0.57 - 1.00 mg/dL Final   05/22/2023 0.90 0.57 - 1.00 mg/dL Final      Lab Results   Component Value Date    WBC 13.22 (H) 05/24/2023    WBC 14.56 (H) 05/23/2023    WBC 14.84 (H) 05/22/2023     C-Reactive Protein   Date Value Ref Range Status   02/05/2022 <0.30 0.00 - 0.50 mg/dL Final   05/24/2021 3.20 (H) 0.00 - 0.50 mg/dL Final   04/19/2021 0.31 0.00 - 0.50 mg/dL Final     Lactate   Date Value Ref Range Status   05/18/2023 1.2 0.5 - 2.0 mmol/L Final   05/17/2023 1.7 0.5 - 2.0 mmol/L Final   05/31/2022 1.5 0.5 - 2.0 mmol/L Final       Temp Readings from Last 1 Encounters:   05/24/23 96.4 °F (35.8 °C) (Temporal)      Lab Results   Component Value Date    VANCOPEAK 30.80 05/21/2023    VANCOTROUGH 19.30 05/21/2023         Culture Results:  Microbiology Results (last 10 days)       Procedure Component Value - Date/Time    Blood Culture - Blood, Hand, Left [[id number]]  (Normal) Collected: 05/17/23 1151    Lab Status: Final result Specimen: Blood from Hand, Left Updated: 05/22/23 1215     Blood Culture No growth at 5 days    Blood Culture - Blood, Arm, Right [[id number]]  (Normal) Collected: 05/17/23 0954    Lab Status: Final result Specimen: Blood from Arm, Right Updated: 05/22/23 1016     Blood Culture No growth at 5 days    COVID-19 and FLU A/B PCR - Swab, Nasopharynx [[id number]]  (Normal) Collected: 05/17/23 0830    Lab Status: Final result Specimen: Swab from Nasopharynx Updated: 05/17/23 0908     COVID19 Not Detected     Influenza A PCR Not Detected     Influenza B PCR Not Detected    Narrative:      Fact sheet for providers: " https://www.fda.gov/media/995816/download    Fact sheet for patients: https://www.fda.gov/media/737511/download    Test performed by PCR.          Current Vancomycin Dose:  1750 mg IVPB every 24 hours, day 7 of therapy.     Pt is also receiving Cefepime     Assessment/Plan:  Reviewed above labs and cultures.   WBC is 13.22, decreasing toward goal . Cr is 0.96, increase from yesterday. Today is the last dose.  Will continue current dose.  Thank  for letting Pharmacy participate in this patients care.     Anita Underwood, PharmD   05/24/23 08:40 CDT

## 2023-05-24 NOTE — PROGRESS NOTES
[facility] Medicine Services  INPATIENT PROGRESS NOTE    Length of Stay: 7  Date of Admission: 5/17/2023  Primary Care Physician: Dolly Foss APRN    Subjective   Chief Complaint: Right lower extremity pain and erythema, fever/chills  HPI: Remains fatigued.  Otherwise okay.    As of today, 05/24/23  Review of Systems   Constitutional: Negative for appetite change, chills, fatigue, fever and unexpected weight change.   Respiratory: Negative for cough, choking, chest tightness, shortness of breath and wheezing.    Cardiovascular: Negative for chest pain, palpitations and leg swelling.   Gastrointestinal: Negative for abdominal pain, blood in stool, constipation, diarrhea, nausea and vomiting.   Genitourinary: Positive for difficulty urinating. Negative for dysuria, flank pain and hematuria.   Skin: Positive for color change.   Neurological: Negative for dizziness, seizures, syncope, speech difficulty, weakness, light-headedness, numbness and headaches.   Hematological: Does not bruise/bleed easily.        All pertinent negatives and positives are as above. All other systems have been reviewed and are negative unless otherwise stated.    Objective    Temp:  [96.4 °F (35.8 °C)-98 °F (36.7 °C)] 98 °F (36.7 °C)  Heart Rate:  [63-79] 74  Resp:  [18] 18  BP: (121-148)/(57-67) 123/57    AM-PAC 6 Clicks Score (PT): 10 (05/24/23 0728)    As of today, 05/24/23  Physical Exam  Vitals reviewed.   Constitutional:       Appearance: She is well-developed.   HENT:      Head: Normocephalic and atraumatic.   Eyes:      Pupils: Pupils are equal, round, and reactive to light.   Cardiovascular:      Rate and Rhythm: Normal rate and regular rhythm.      Heart sounds: Normal heart sounds. No murmur heard.    No friction rub. No gallop.   Pulmonary:      Effort: Pulmonary effort is normal. No respiratory distress.      Breath sounds: Normal breath sounds. No wheezing or rales.   Chest:       Chest wall: No tenderness.   Abdominal:      General: Bowel sounds are normal. There is no distension.      Palpations: Abdomen is soft.      Tenderness: There is no abdominal tenderness. There is no guarding.   Musculoskeletal:      Cervical back: Normal range of motion and neck supple.      Comments: Left BKA   Skin:     General: Skin is warm and dry.      Comments: Right lower extremity erythema improving.  Blister on dorsum of right foot.  Ruptured blister on medial aspect of right distal lower extremity.   Psychiatric:         Behavior: Behavior normal.         Thought Content: Thought content normal.       Results Review:  I have reviewed the labs, radiology results, and diagnostic studies.    Laboratory Data:   Results from last 7 days   Lab Units 05/24/23  0511 05/23/23  2212 05/23/23  1706 05/23/23  0552 05/22/23  0600 05/21/23  1851 05/21/23  0605 05/20/23  2021 05/20/23  0625 05/19/23  0246   SODIUM mmol/L 138  --   --  139 135*  --  136  --  136 131*   POTASSIUM mmol/L 4.3 3.8 3.6 3.4* 3.9   < > 3.6   < > 3.0* 3.7   CHLORIDE mmol/L 95*  --   --  96* 97*  --  98  --  98 96*   CO2 mmol/L 31.0*  --   --  34.0* 27.0  --  28.0  --  27.0 23.0   BUN mg/dL 10  --   --  10 9  --  10  --  12 15   CREATININE mg/dL 0.96  --   --  0.88 0.90  --  0.98  --  1.15* 1.12*   GLUCOSE mg/dL 244*  --   --  156* 228*  --  236*  --  198* 181*   CALCIUM mg/dL 9.2  --   --  9.7 9.1  --  8.9  --  8.8 8.5*   BILIRUBIN mg/dL  --   --   --   --   --   --  0.6  --  0.6 0.6   ALK PHOS U/L  --   --   --   --   --   --  147*  --  105 90   ALT (SGPT) U/L  --   --   --   --   --   --  12  --  12 13   AST (SGOT) U/L  --   --   --   --   --   --  18  --  15 16   ANION GAP mmol/L 12.0  --   --  9.0 11.0  --  10.0  --  11.0 12.0    < > = values in this interval not displayed.     Estimated Creatinine Clearance: 87.3 mL/min (by C-G formula based on SCr of 0.96 mg/dL).  Results from last 7 days   Lab Units 05/23/23  7408 05/23/23  0581  05/21/23  0605   MAGNESIUM mg/dL 1.7 1.6 1.8         Results from last 7 days   Lab Units 05/24/23  0511 05/23/23  0552 05/22/23  0600 05/21/23  0605 05/20/23  0625   WBC 10*3/mm3 13.22* 14.56* 14.84* 16.28* 17.18*   HEMOGLOBIN g/dL 12.0 11.7* 11.8* 10.9* 10.7*   HEMATOCRIT % 36.5 35.3 34.2 33.1* 32.6*   PLATELETS 10*3/mm3 491* 506* 401 395 308           Culture Data:   No results found for: BLOODCX  No results found for: URINECX  No results found for: RESPCX  No results found for: WOUNDCX  No results found for: STOOLCX  No components found for: BODYFLD    Radiology Data:   Imaging Results (Last 24 Hours)     Procedure Component Value Units Date/Time    XR Abdomen KUB [[id number]] Collected: 05/24/23 1045     Updated: 05/24/23 1053    Narrative:      FINDINGS/    Impression:      Two (2) views of the abdomen and pelvis demonstrate significant stool throughout  the entire colon.    There is a presacral stimulator lead in place.    No other significant finding.  No active disease.          I have utilized all available immediate resources to obtain, update, or review the patient's current medications (including all prescriptions, over-the-counter products, herbals, cannabis/cannabidiol products, and vitamin/mineral/dietary (nutritional) supplements).       Assessment/Plan     Active Hospital Problems    Diagnosis    • **Sepsis without acute organ dysfunction, due to unspecified organism        Plan:  1.  Right lower extremity cellulitis: Continues to improve.  Continue empiric antibiotics to complete course.  Wound care following.  2.  Urinary retention: Appreciate urology help.  Plan cystoscopy.  3.  Hypertension: Well-controlled.  4.  Coronary artery disease: Continue home medication.  5.  Diabetes mellitus: Glucose readings better after the addition of a.m. Levemir.  6.  DVT prophylaxis: Heparin.      Medical Decision Making  Number and Complexity of problems: Several highly complex medical problems    Conditions  and Status:        Condition is improving.       Discussed with: Patient and      Treatment Plan  As above    Care Planning  Shared decision making: Patient in agreement with plan of care  Code status and discussions: Full code    Disposition  Social Determinants of Health that impact treatment or disposition: None  I expect the patient to be discharged to home in 1-2 days.       The patient was evaluated during the global COVID-19 pandemic, and the diagnosis was suspected/considered upon their initial presentation.  Evaluation, treatment, and testing were consistent with current guidelines for patients who present with complaints or symptoms that may be related to COVID-19.    I confirmed that the patient's Advance Care Plan is present, code status is documented, or surrogate decision maker is listed in the patient's medical record.        This document has been electronically signed by Mahin Esteves MD on May 24, 2023 12:18 CDT

## 2023-05-24 NOTE — THERAPY TREATMENT NOTE
Acute Care - Physical Therapy Treatment Note  [facility]     Patient Name: Ariana Martinez  : 1960  MRN: [id number]  Today's Date: 2023      Visit Dx:     ICD-10-CM ICD-9-CM   1. Sepsis without acute organ dysfunction, due to unspecified organism  A41.9 038.9     995.91   2. Cellulitis of right lower extremity  L03.115 682.6   3. Type 2 diabetes mellitus with hyperglycemia, unspecified whether long term insulin use  E11.65 250.00   4. Impaired mobility and activities of daily living  Z74.09 V49.89    Z78.9    5. Impaired physical mobility  Z74.09 781.99   6. Impaired mobility and ADLs  Z74.09 V49.89    Z78.9      Patient Active Problem List   Diagnosis   • 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, with long-term current use of insulin   • Closed nondisplaced fracture of fifth left metatarsal bone   • MAURICIO (generalized anxiety disorder)   • Depression, major, recurrent, moderate (HCC)   • GERD without esophagitis   • Long term prescription opiate use   • Mixed hyperlipidemia   • Vitamin D deficiency   • Seasonal allergic rhinitis   • Restrictive lung disease secondary to obesity   • Adult body mass index 37.0-37.9   • Snoring   • Class 3 severe obesity due to excess calories without serious comorbidity with body mass index (BMI) of 40.0 to 44.9 in adult (HCC)   • (HFpEF) heart failure with preserved ejection fraction   • Type 2 diabetes mellitus with hyperglycemia, with long-term current use of insulin ([facility])   • Cyanocobalamin deficiency   • Coronary artery disease of native artery of native heart with stable angina pectoris   • Hypertension   • Meniere's disease   • Gastroparesis   • Pulmonary hypertension ([facility])   • Pes cavus   • Primary osteoarthritis involving multiple joints   • Generalized anxiety disorder   • Chronic right-sided low back pain with right-sided sciatica   • Chest pain   • Adverse effect of iron   • Chest pain due to myocardial ischemia   • Nonrheumatic  tricuspid valve regurgitation   • Iron deficiency anemia due to chronic blood loss   • Malaise and fatigue   • Ankle arthritis   • Urinary retention   • Endocarditis   • Essential hypertension   • Occlusion and stenosis of bilateral carotid arteries   • S/P BKA (below knee amputation) unilateral, left (HCC)   • Phantom pain after amputation of lower extremity   • S/P CABG (coronary artery bypass graft)   • Severe malnutrition   • TIA (transient ischemic attack)   • Coronary artery abnormality   • Elevated d-dimer   • Neuropathy   • Chest pain, unspecified type   • Acute pain of right shoulder   • Rotator cuff syndrome, right   • Acquired hypothyroidism   • Bilateral leg edema   • Left elbow pain   • Gastritis   • Olecranon bursitis, left elbow   • Sepsis without acute organ dysfunction, due to unspecified organism     Past Medical History:   Diagnosis Date   • Acute bacterial endocarditis 3/13/2021   • Angina, class IV    • Anxiety    • Anxiety and depression    • Arthritis    • Benign paroxysmal positional vertigo    • Bladder disorder     has bladder stimulator   • Carpal tunnel syndrome    • CHF (congestive heart failure)    • Chronic pain    • Coronary atherosclerosis     hx CABG 2005.  is followed by Dr Kwon   • Depression    • Diabetes mellitus     Type 2, controlled   • Diabetic polyneuropathy    • Disease of thyroid gland    • Elevated cholesterol    • Female stress incontinence    • Foot pain, left    • Full dentures    • Gastroparesis    • GERD (gastroesophageal reflux disease)    • Hyperlipidemia    • Hypertension    • Low back pain    • Malaise and fatigue    • Multiple joint pain    • Obesity     Refuses to be weighed   • Occlusion and stenosis of bilateral carotid arteries 6/18/2021   • Otalgia     Both   • Perforation of tympanic membrane     Left   • Postoperative wound infection    • Vitamin D deficiency    • Wears glasses     reading     Past Surgical History:   Procedure Laterality Date   •  ABDOMINAL SURGERY     • AMPUTATION FOOT     • ANGIOPLASTY      coronary   • ANKLE ARTHROSCOPY Left 02/26/2021    Procedure: Left foot hardware removal, ankle arthroscopy, bone grafting , left foot exostectomy;  Surgeon: Ignacio Lord DPM;  Location: [facility] OR;  Service: Podiatry;  Laterality: Left;   • BREAST BIOPSY Right    • CARDIAC CATHETERIZATION     • CARDIAC CATHETERIZATION N/A 06/20/2017    Procedure: Right Heart Cath;  Surgeon: Can Kwon MD PhD;  Location: [facility] CATH INVASIVE LOCATION;  Service:    • CARDIAC CATHETERIZATION N/A 02/18/2020    Procedure: Left Heart Cath;  Surgeon: Catalina Cooper MD;  Location: [facility] CATH INVASIVE LOCATION;  Service: Cardiology;  Laterality: N/A;   • CARDIAC CATHETERIZATION N/A 04/06/2022    Procedure: Left Heart Cath;  Surgeon: Sheryl Navas MD;  Location: [facility] CATH INVASIVE LOCATION;  Service: Cardiology;  Laterality: N/A;   • CARPAL TUNNEL RELEASE     • CHOLECYSTECTOMY     • COLONOSCOPY N/A 06/24/2020    Procedure: COLONOSCOPY;  Surgeon: Julián Maldonado MD;  Location: [facility] ENDOSCOPY;  Service: Gastroenterology;  Laterality: N/A;   • CORONARY ARTERY BYPASS GRAFT  2005   • ENDOSCOPY N/A 10/19/2018    Procedure: ESOPHAGOGASTRODUODENOSCOPY possible dilation;  Surgeon: Julián Maldonado MD;  Location: [facility] ENDOSCOPY;  Service: Gastroenterology   • ENDOSCOPY N/A 06/24/2020    Procedure: ESOPHAGOGASTRODUODENOSCOPY WED appt please;  Surgeon: Julián Maldonado MD;  Location: [facility] ENDOSCOPY;  Service: Gastroenterology;  Laterality: N/A;   • ENDOSCOPY N/A 06/10/2022    Procedure: ESOPHAGOGASTRODUODENOSCOPY   room 380;  Surgeon: Jeremiah Wilkins MD;  Location: [facility] ENDOSCOPY;  Service: Gastroenterology;  Laterality: N/A;   • ENDOSCOPY N/A 1/10/2023    Procedure: ESOPHAGOGASTRODUODENOSCOPY;  Surgeon: Jeremiah Wilkins MD;  Location: [facility] ENDOSCOPY;  Service: Gastroenterology;  Laterality: N/A;   • ENDOSCOPY AND COLONOSCOPY     • FOOT SURGERY       Toes   • FOOT SURGERY     • GASTRIC BANDING      Revision, laparoscopic   • HYSTERECTOMY     • INCISION AND DRAINAGE LEG Left 03/12/2021    Procedure: Left ankle arthroscopic irrigation and debridement, screw removal;  Surgeon: Ignacio Lord DPM;  Location: [facility];  Service: Podiatry;  Laterality: Left;   • MOUTH SURGERY     • SALPINGO OOPHORECTOMY     • SHOULDER SURGERY     • SUBTALAR ARTHRODESIS Left 01/16/2019    Procedure: LEFT FOOT HARDWARE REMOVAL, FIFTH METATARSAL , OPEN REDUCTION INTERNAL FIXATION, CALCANEAL OSTEOTOMY;  Surgeon: Ignacio Lord DPM;  Location: [facility];  Service: Podiatry   • SUBTALAR ARTHRODESIS Left 10/16/2019    Procedure: foot hardware removal, subtalar joint fusion  possible de/reattachment of achilles tendon        (c-arm);  Surgeon: Ignacio Lord DPM;  Location: [facility];  Service: Podiatry   • SUBTALAR ARTHRODESIS Left 09/30/2020    Procedure: subtalar, talonavicular joint arthrodesis.  Removal hardware.          (c-arm);  Surgeon: Ignacio Lord DPM;  Location: [facility];  Service: Podiatry;  Laterality: Left;   • TRANSESOPHAGEAL ECHOCARDIOGRAM (LAMONTE)      With color flow     PT Assessment (last 12 hours)     PT Evaluation and Treatment     Row Name 05/24/23 1229          Physical Therapy Time and Intention    Document Type therapy note (daily note)  -     Mode of Treatment physical therapy  -     Patient Effort good  -     Row Name 05/24/23 1229          General Information    Patient Profile Reviewed yes  -AME     Existing Precautions/Restrictions fall  -     Row Name 05/24/23 1229          Pain    Pretreatment Pain Rating 7/10  -AME     Posttreatment Pain Rating 7/10  -AME     Pain Location - Side/Orientation Right  -     Pain Location lower  -     Pain Location - extremity  -     Row Name 05/24/23 1229          Cognition    Affect/Mental Status (Cognition) WFL  -     Orientation Status (Cognition) oriented x 4  -AME     Personal Safety  Interventions fall prevention program maintained;gait belt;nonskid shoes/slippers when out of bed;muscle strengthening facilitated;supervised activity  -     Row Name 05/24/23 1229          Bed Mobility    Bed Mobility supine-sit;sit-supine;rolling left;rolling right  -     Rolling Left [place] (Bed Mobility) standby assist  -AME     Rolling Right [place] (Bed Mobility) standby assist  -AME     Supine-Sit [place] (Bed Mobility) contact guard  -     Sit-Supine [place] (Bed Mobility) contact guard  -     Assistive Device (Bed Mobility) head of bed elevated;bed rails  -     Row Name 05/24/23 1229          Transfers    Transfers sit-stand transfer;stand-sit transfer  -     Comment, (Transfers) sit-stand x2. able to maintain standing 60 sec and 2.5 mins. pt declines t/f to recliner. doesn't feel she is ready.  -     Row Name 05/24/23 1229          Sit-Stand Transfer    Sit-Stand [place] (Transfers) minimum assist (75% patient effort)  -     Assistive Device (Sit-Stand Transfers) walker, front-wheeled  -     Row Name 05/24/23 1229          Stand-Sit Transfer    Stand-Sit [place] (Transfers) minimum assist (75% patient effort)  -     Assistive Device (Stand-Sit Transfers) walker, front-wheeled  -     Row Name 05/24/23 1229          Gait/Stairs (Locomotion)    Gait/Stairs Locomotion gait/ambulation independence;gait/ambulation assistive device;distance ambulated;gait pattern;gait deviations;maintains weight-bearing status  -     [place] Level (Gait) minimum assist (75% patient effort)  -     Assistive Device (Gait) walker, front-wheeled  -     Distance in Feet (Gait) 2-3 ft by scooting R ANDREA  -     Deviations/Abnormal Patterns (Gait) --  unable to take a full step without prosthesis. prosthesis not present.  -     Row Name 05/24/23 1229          Safety Issues, Functional Mobility    Impairments Affecting Function (Mobility) endurance/activity  tolerance;strength;pain  -AME     Row Name 05/24/23 1229          Motor Skills    Therapeutic Exercise --  crunches, single leg bridging, hip ext in SL, SLR- 20x1 arabella AROM  -AME     Row Name             Wound 05/17/23 1828 Right anterior fifth toe    Wound - Properties Group Placement Date: 05/17/23  - Placement Time: 1828  -JH Side: Right  -JH Orientation: anterior  -JH Location: fifth toe  -JH    Retired Wound - Properties Group Placement Date: 05/17/23  - Placement Time: 1828  -JH Side: Right  -JH Orientation: anterior  -JH Location: fifth toe  -JH    Retired Wound - Properties Group Date first assessed: 05/17/23  - Time first assessed: 1828  -JH Side: Right  -JH Location: fifth toe  -JH    Row Name             Wound 05/23/23 1006 Right distal leg    Wound - Properties Group Placement Date: 05/23/23  - Placement Time: 1006  -LH Present on Hospital Admission: N  -LH Side: Right  -LH Orientation: distal  -LH Location: leg  -LH    Retired Wound - Properties Group Placement Date: 05/23/23  - Placement Time: 1006  -LH Present on Hospital Admission: N  -LH Side: Right  -LH Orientation: distal  -LH Location: leg  -LH    Retired Wound - Properties Group Date first assessed: 05/23/23  - Time first assessed: 1006  -LH Present on Hospital Admission: N  -LH Side: Right  -LH Location: leg  -LH    Row Name 05/24/23 1229          Vital Signs    Pre Systolic BP Rehab 137  -AME     Pre Treatment Diastolic BP 58  -AME     Post Systolic BP Rehab 115  -AME     Post Treatment Diastolic BP 55  -AME     Pretreatment Heart Rate (beats/min) 74  -AME     Posttreatment Heart Rate (beats/min) 72  -AME     Pre SpO2 (%) 95  -AME     O2 Delivery Pre Treatment nasal cannula  -AME     Post SpO2 (%) 99  -AME     O2 Delivery Post Treatment supplemental O2  -AME     Pre Patient Position Supine  -AME     Post Patient Position Supine  -AME     Row Name 05/24/23 1229          Positioning and Restraints    Pre-Treatment Position in bed  -AME     Post  Treatment Position bed  -AME     In Bed fowlers;call light within reach;encouraged to call for assist;exit alarm on  -AME     Row Name 05/24/23 122          Therapy Assessment/Plan (PT)    Rehab Potential (PT) good, to achieve stated therapy goals  -     Criteria for Skilled Interventions Met (PT) yes;meets criteria;skilled treatment is necessary  -     Therapy Frequency (PT) other (see comments)  5-7 days/wk  -     Row Name 05/24/23 1229          Bed Mobility Goal 1 (PT)    Activity/Assistive Device (Bed Mobility Goal 1, PT) bed mobility activities, all  -AME     [place] Level/Cues Needed (Bed Mobility Goal 1, PT) standby assist;independent  -AME     Time Frame (Bed Mobility Goal 1, PT) by discharge  -AME     Progress/Outcomes (Bed Mobility Goal 1, PT) goal met  -     Row Name 05/24/23 1229          Transfer Goal 1 (PT)    Activity/Assistive Device (Transfer Goal 1, PT) sit-to-stand/stand-to-sit;bed-to-chair/chair-to-bed  -AME     [place] Level/Cues Needed (Transfer Goal 1, PT) contact guard required;standby assist;modified independence  -AME     Time Frame (Transfer Goal 1, PT) by discharge  -AME     Progress/Outcome (Transfer Goal 1, PT) goal not met  -     Row Name 05/24/23 1229          Gait Training Goal 1 (PT)    Activity/Assistive Device (Gait Training Goal 1, PT) gait (walking locomotion);decrease fall risk  -     [place] Level (Gait Training Goal 1, PT) contact guard required;standby assist;modified independence  -AME     Distance (Gait Training Goal 1, PT) 50ft or more each trip  -AME     Time Frame (Gait Training Goal 1, PT) 1 week  -AME     Progress/Outcome (Gait Training Goal 1, PT) goal not met  -     Row Name 05/24/23 1228          ROM Goal 1 (PT)    ROM Goal 1 (PT) Pt will tolerate LE exercises OOB in chair with VSS  -AME     Time Frame (ROM Goal 1, PT) by discharge  -AME     Progress/Outcome (ROM Goal 1, PT) goal not met  -           User Key  (r) = Recorded By, (t) = Taken By,  (c) = Cosigned By    Initials Name Provider Type     Ousmane Zhang, PTA Physical Therapist Assistant     Juany Singh, RN Registered Nurse    Janae Severino LPN Licensed Nurse                Physical Therapy Education     Title: PT OT SLP Therapies (In Progress)     Topic: Physical Therapy (In Progress)     Point: Mobility training (In Progress)     Learning Progress Summary           Patient Acceptance, E, NR by  at 5/24/2023 1306    Acceptance, E,TB, VU by NB at 5/18/2023 2138    Acceptance, E, NR by [facility] at 5/18/2023 1407                   Point: Home exercise program (Not Started)     Learner Progress:  Not documented in this visit.          Point: Body mechanics (Not Started)     Learner Progress:  Not documented in this visit.          Point: Precautions (In Progress)     Learning Progress Summary           Patient Acceptance, E, NR by [facility] at 5/18/2023 1407                               User Key     Initials Effective Dates Name Provider Type Discipline    [facility] 06/16/21 -  Aure Villaseñor PT Physical Therapist PT     06/16/21 -  Ousmane Zhang PTA Physical Therapist Assistant PT    NB 06/16/21 -  Samantha Cuellar RN Registered Nurse Nurse              PT Recommendation and Plan  Anticipated Discharge Disposition (PT): home with assist, home with home health  Therapy Frequency (PT): other (see comments) (5-7 days/wk)  Plan of Care Reviewed With: patient  Progress: improving  Outcome Evaluation: pt responded well to PT. pt requires CGA for sup-sit-sup. min A for sit-stand-sit. stood x2 for as long as 2.5 mins. 2-ft by scooting R LE. pt unable to take a full step without prosthesis. pt participated in LE and core strengthening. no new goals met at this time. pt would continue to benefit from PT services.   Outcome Measures     Row Name 05/24/23 1229             How much help from another person do you currently need...    Turning from your back to your side while in flat bed without using  bedrails? 3  -AME      Moving from lying on back to sitting on the side of a flat bed without bedrails? 3  -AME      Moving to and from a bed to a chair (including a wheelchair)? 2  -AME      Standing up from a chair using your arms (e.g., wheelchair, bedside chair)? 3  -AME      Climbing 3-5 steps with a railing? 1  -AME      To walk in hospital room? 1  -AME      AM-PAC 6 Clicks Score (PT) 13  -AME         Functional Assessment    Outcome Measure Options AM-PAC 6 Clicks Basic Mobility (PT)  -AME            User Key  (r) = Recorded By, (t) = Taken By, (c) = Cosigned By    Initials Name Provider Type    Ousmane Gomes PTA Physical Therapist Assistant                 Time Calculation:    PT Charges     Row Name 05/24/23 1309             Time Calculation    Start Time 1229  -AME      Stop Time 1308  -AME      Time Calculation (min) 39 min  -AME         Time Calculation- PT    Total Timed Code Minutes- PT 39 minute(s)  -AME         Timed Charges    84849 - PT Therapeutic Exercise Minutes 16  -AME      74435 - PT Therapeutic Activity Minutes 23  -AME         Total Minutes    Timed Charges Total Minutes 39  -AME       Total Minutes 39  -AME            User Key  (r) = Recorded By, (t) = Taken By, (c) = Cosigned By    Initials Name Provider Type    Ousmane Gomes PTA Physical Therapist Assistant              Therapy Charges for Today     Code Description Service Date Service Provider Modifiers Qty    28333796337 HC PT THERAPEUTIC ACT EA 15 MIN 5/23/2023 Ousmane Zhang, PTA GP 1    86657423202 HC PT THER PROC EA 15 MIN 5/23/2023 Ousmane Zhang PTA GP 2    78396416170 HC PT THER PROC EA 15 MIN 5/24/2023 Ousmane Zhang, PTA GP 1    39890890993 HC PT THERAPEUTIC ACT EA 15 MIN 5/24/2023 Ousmane Zhang, PTA GP 2          PT G-Codes  Outcome Measure Options: AM-PAC 6 Clicks Basic Mobility (PT)  AM-PAC 6 Clicks Score (PT): 13  AM-PAC 6 Clicks Score (OT): 19    Ousmane Zhang PTA  5/24/2023

## 2023-05-24 NOTE — PLAN OF CARE
Goal Outcome Evaluation:  Plan of Care Reviewed With: patient     Problem: Adult Inpatient Plan of Care  Goal: Plan of Care Review  Outcome: Ongoing, Progressing  Flowsheets (Taken 5/24/2023 7854)  Progress: improving  Plan of Care Reviewed With: patient  Outcome Evaluation: pt responded well to PT. pt requires CGA for sup-sit-sup. min A for sit-stand-sit. stood x2 for as long as 2.5 mins. 2-3ft by scooting R LE. pt unable to take a full step without prosthesis. pt participated in LE and core strengthening. no new goals met at this time. pt would continue to benefit from PT services.

## 2023-05-25 DIAGNOSIS — I10 ESSENTIAL HYPERTENSION: Chronic | ICD-10-CM

## 2023-05-25 LAB
BASOPHILS # BLD AUTO: 0.1 10*3/MM3 (ref 0–0.2)
BASOPHILS NFR BLD AUTO: 0.6 % (ref 0–1.5)
DEPRECATED RDW RBC AUTO: 43 FL (ref 37–54)
EOSINOPHIL # BLD AUTO: 0.36 10*3/MM3 (ref 0–0.4)
EOSINOPHIL NFR BLD AUTO: 2.1 % (ref 0.3–6.2)
ERYTHROCYTE [DISTWIDTH] IN BLOOD BY AUTOMATED COUNT: 13.3 % (ref 12.3–15.4)
GLUCOSE BLDC GLUCOMTR-MCNC: 204 MG/DL (ref 70–130)
GLUCOSE BLDC GLUCOMTR-MCNC: 228 MG/DL (ref 70–130)
GLUCOSE BLDC GLUCOMTR-MCNC: 307 MG/DL (ref 70–130)
GLUCOSE BLDC GLUCOMTR-MCNC: 313 MG/DL (ref 70–130)
HCT VFR BLD AUTO: 35.2 % (ref 34–46.6)
HGB BLD-MCNC: 11.2 G/DL (ref 12–15.9)
HOLD SPECIMEN: NORMAL
IMM GRANULOCYTES # BLD AUTO: 0.51 10*3/MM3 (ref 0–0.05)
IMM GRANULOCYTES NFR BLD AUTO: 2.9 % (ref 0–0.5)
LYMPHOCYTES # BLD AUTO: 1.69 10*3/MM3 (ref 0.7–3.1)
LYMPHOCYTES NFR BLD AUTO: 9.7 % (ref 19.6–45.3)
MCH RBC QN AUTO: 27.8 PG (ref 26.6–33)
MCHC RBC AUTO-ENTMCNC: 31.8 G/DL (ref 31.5–35.7)
MCV RBC AUTO: 87.3 FL (ref 79–97)
MONOCYTES # BLD AUTO: 1.02 10*3/MM3 (ref 0.1–0.9)
MONOCYTES NFR BLD AUTO: 5.9 % (ref 5–12)
NEUTROPHILS NFR BLD AUTO: 13.69 10*3/MM3 (ref 1.7–7)
NEUTROPHILS NFR BLD AUTO: 78.8 % (ref 42.7–76)
NRBC BLD AUTO-RTO: 0 /100 WBC (ref 0–0.2)
PLATELET # BLD AUTO: 643 10*3/MM3 (ref 140–450)
PMV BLD AUTO: 9.2 FL (ref 6–12)
RBC # BLD AUTO: 4.03 10*6/MM3 (ref 3.77–5.28)
RBC MORPH BLD: NORMAL
SMALL PLATELETS BLD QL SMEAR: NORMAL
TOXIC GRANULATION: NORMAL
WBC NRBC COR # BLD: 17.37 10*3/MM3 (ref 3.4–10.8)

## 2023-05-25 PROCEDURE — 85025 COMPLETE CBC W/AUTO DIFF WBC: CPT | Performed by: HOSPITALIST

## 2023-05-25 PROCEDURE — 97535 SELF CARE MNGMENT TRAINING: CPT

## 2023-05-25 PROCEDURE — 82948 REAGENT STRIP/BLOOD GLUCOSE: CPT

## 2023-05-25 PROCEDURE — 85007 BL SMEAR W/DIFF WBC COUNT: CPT | Performed by: HOSPITALIST

## 2023-05-25 PROCEDURE — 25010000002 HEPARIN (PORCINE) PER 1000 UNITS: Performed by: HOSPITALIST

## 2023-05-25 PROCEDURE — 97530 THERAPEUTIC ACTIVITIES: CPT

## 2023-05-25 PROCEDURE — 63710000001 INSULIN ASPART PER 5 UNITS: Performed by: HOSPITALIST

## 2023-05-25 PROCEDURE — 63710000001 INSULIN DETEMIR PER 5 UNITS: Performed by: INTERNAL MEDICINE

## 2023-05-25 PROCEDURE — 63710000001 INSULIN DETEMIR PER 5 UNITS: Performed by: HOSPITALIST

## 2023-05-25 RX ORDER — LORAZEPAM 0.5 MG/1
0.5 TABLET ORAL EVERY 8 HOURS PRN
Status: DISPENSED | OUTPATIENT
Start: 2023-05-25 | End: 2023-06-01

## 2023-05-25 RX ADMIN — Medication 10 ML: at 20:39

## 2023-05-25 RX ADMIN — GABAPENTIN 200 MG: 100 CAPSULE ORAL at 20:36

## 2023-05-25 RX ADMIN — SUCRALFATE 1 G: 1 TABLET ORAL at 17:21

## 2023-05-25 RX ADMIN — INSULIN DETEMIR 45 UNITS: 100 INJECTION, SOLUTION SUBCUTANEOUS at 20:38

## 2023-05-25 RX ADMIN — DOCUSATE SODIUM 50 MG AND SENNOSIDES 8.6 MG 2 TABLET: 8.6; 5 TABLET, FILM COATED ORAL at 08:25

## 2023-05-25 RX ADMIN — SUCRALFATE 1 G: 1 TABLET ORAL at 08:25

## 2023-05-25 RX ADMIN — PANTOPRAZOLE SODIUM 40 MG: 40 TABLET, DELAYED RELEASE ORAL at 08:25

## 2023-05-25 RX ADMIN — INSULIN ASPART 5 UNITS: 100 INJECTION, SOLUTION INTRAVENOUS; SUBCUTANEOUS at 08:27

## 2023-05-25 RX ADMIN — RANOLAZINE 500 MG: 500 TABLET, FILM COATED, EXTENDED RELEASE ORAL at 20:36

## 2023-05-25 RX ADMIN — LORAZEPAM 0.5 MG: 0.5 TABLET ORAL at 04:57

## 2023-05-25 RX ADMIN — Medication 10 ML: at 08:40

## 2023-05-25 RX ADMIN — ATORVASTATIN CALCIUM 80 MG: 40 TABLET, FILM COATED ORAL at 08:26

## 2023-05-25 RX ADMIN — SUCRALFATE 1 G: 1 TABLET ORAL at 11:41

## 2023-05-25 RX ADMIN — INSULIN DETEMIR 20 UNITS: 100 INJECTION, SOLUTION SUBCUTANEOUS at 08:27

## 2023-05-25 RX ADMIN — HEPARIN SODIUM 5000 UNITS: 5000 INJECTION INTRAVENOUS; SUBCUTANEOUS at 20:41

## 2023-05-25 RX ADMIN — HYDROCODONE BITARTRATE AND ACETAMINOPHEN 1 TABLET: 7.5; 325 TABLET ORAL at 13:50

## 2023-05-25 RX ADMIN — HYDROCODONE BITARTRATE AND ACETAMINOPHEN 1 TABLET: 7.5; 325 TABLET ORAL at 02:24

## 2023-05-25 RX ADMIN — INSULIN ASPART 10 UNITS: 100 INJECTION, SOLUTION INTRAVENOUS; SUBCUTANEOUS at 17:21

## 2023-05-25 RX ADMIN — CLOPIDOGREL BISULFATE 75 MG: 75 TABLET ORAL at 08:26

## 2023-05-25 RX ADMIN — ARIPIPRAZOLE 5 MG: 5 TABLET ORAL at 08:26

## 2023-05-25 RX ADMIN — VENLAFAXINE HYDROCHLORIDE 75 MG: 75 CAPSULE, EXTENDED RELEASE ORAL at 08:26

## 2023-05-25 RX ADMIN — FOLIC ACID 1000 MCG: 1 TABLET ORAL at 08:25

## 2023-05-25 RX ADMIN — LEVOTHYROXINE SODIUM 50 MCG: 50 TABLET ORAL at 04:57

## 2023-05-25 RX ADMIN — SUCRALFATE 1 G: 1 TABLET ORAL at 20:36

## 2023-05-25 RX ADMIN — LORAZEPAM 0.5 MG: 0.5 TABLET ORAL at 13:50

## 2023-05-25 RX ADMIN — RANOLAZINE 500 MG: 500 TABLET, FILM COATED, EXTENDED RELEASE ORAL at 08:26

## 2023-05-25 RX ADMIN — FUROSEMIDE 40 MG: 40 TABLET ORAL at 08:26

## 2023-05-25 RX ADMIN — GABAPENTIN 200 MG: 100 CAPSULE ORAL at 08:25

## 2023-05-25 RX ADMIN — ASPIRIN 325 MG: 325 TABLET, COATED ORAL at 08:26

## 2023-05-25 RX ADMIN — INSULIN ASPART 10 UNITS: 100 INJECTION, SOLUTION INTRAVENOUS; SUBCUTANEOUS at 11:41

## 2023-05-25 RX ADMIN — TAMSULOSIN HYDROCHLORIDE 0.4 MG: 0.4 CAPSULE ORAL at 08:26

## 2023-05-25 RX ADMIN — HEPARIN SODIUM 5000 UNITS: 5000 INJECTION INTRAVENOUS; SUBCUTANEOUS at 13:50

## 2023-05-25 RX ADMIN — HEPARIN SODIUM 5000 UNITS: 5000 INJECTION INTRAVENOUS; SUBCUTANEOUS at 04:58

## 2023-05-25 NOTE — PLAN OF CARE
Problem: Adult Inpatient Plan of Care  Goal: Plan of Care Review  5/25/2023 1500 by Sarah Irby COTA  Outcome: Ongoing, Progressing  Flowsheets (Taken 5/25/2023 1500)  Plan of Care Reviewed With: patient     Problem: Adult Inpatient Plan of Care  Goal: Plan of Care Review  5/25/2023 1500 by Sarah Irby COTA  Outcome: Ongoing, Progressing  Flowsheets (Taken 5/25/2023 1500)  Plan of Care Reviewed With: patient   Goal Outcome Evaluation:  Plan of Care Reviewed With: patient        Progress: improving  Outcome Evaluation: Pt supine in bed. Supine to sit<>sit to supine CGA. Pt sitting EOB while Performing UB and LB bathing and dressing with SBA. Grooming SBA. Pt needing increased time for tasks. Pt supine in bed all needs in reach. Pt would continue to benefit from OT services.

## 2023-05-25 NOTE — PLAN OF CARE
Goal Outcome Evaluation:  Plan of Care Reviewed With: patient     Problem: Adult Inpatient Plan of Care  Goal: Plan of Care Review  Outcome: Ongoing, Progressing  Flowsheets (Taken 5/25/2023 4916)  Progress: improving  Plan of Care Reviewed With: patient  Outcome Evaluation: bed mobility- CGA. sit-stand-sit- min A. t/f to/from [facility] mod A x2. pt is anxious with t/fs. no new goals met at this time. pt would continue to benfeit from PT services.

## 2023-05-25 NOTE — PLAN OF CARE
Goal Outcome Evaluation:  Plan of Care Reviewed With: patient        Progress: improving  Outcome Evaluation: VSS, 3L NC, PRN meds given per orders per pt request, RLE remains warm, red, and edematous, no other complaints, resting in between care.

## 2023-05-25 NOTE — PROGRESS NOTES
[facility]  INPATIENT PROGRESS NOTE    Length of Stay: 8  Date of Admission: 5/17/2023  Primary Care Physician: Dolly Foss APRN    Subjective   Chief Complaint: Right lower extremity pain and erythema, fever/chills  HPI: Continues to have significant fatigue.    As of today, 05/25/23  Review of Systems   Constitutional: Negative for appetite change, chills, fatigue, fever and unexpected weight change.   Respiratory: Negative for cough, choking, chest tightness, shortness of breath and wheezing.    Cardiovascular: Negative for chest pain, palpitations and leg swelling.   Gastrointestinal: Negative for abdominal pain, blood in stool, constipation, diarrhea, nausea and vomiting.   Genitourinary: Positive for difficulty urinating. Negative for dysuria, flank pain and hematuria.   Skin: Positive for color change.   Neurological: Negative for dizziness, seizures, syncope, speech difficulty, weakness, light-headedness, numbness and headaches.   Hematological: Does not bruise/bleed easily.        All pertinent negatives and positives are as above. All other systems have been reviewed and are negative unless otherwise stated.    Objective    Temp:  [96.1 °F (35.6 °C)-98 °F (36.7 °C)] 98 °F (36.7 °C)  Heart Rate:  [69-84] 77  Resp:  [18] 18  BP: (105-130)/(51-60) 105/51    AM-PAC 6 Clicks Score (PT): 13 (05/25/23 1346)    As of today, 05/25/23  Physical Exam  Vitals reviewed.   Constitutional:       Appearance: She is well-developed.   HENT:      Head: Normocephalic and atraumatic.   Eyes:      Pupils: Pupils are equal, round, and reactive to light.   Cardiovascular:      Rate and Rhythm: Normal rate and regular rhythm.      Heart sounds: Normal heart sounds. No murmur heard.    No friction rub. No gallop.   Pulmonary:      Effort: Pulmonary effort is normal. No respiratory distress.      Breath sounds: Normal breath sounds. No wheezing or rales.    Chest:      Chest wall: No tenderness.   Abdominal:      General: Bowel sounds are normal. There is no distension.      Palpations: Abdomen is soft.      Tenderness: There is no abdominal tenderness. There is no guarding.   Musculoskeletal:      Cervical back: Normal range of motion and neck supple.      Comments: Left BKA   Skin:     General: Skin is warm and dry.      Comments: Right lower extremity erythema improving.  Blister on dorsum of right foot.  Ruptured blister on medial aspect of right distal lower extremity.   Psychiatric:         Behavior: Behavior normal.         Thought Content: Thought content normal.       Results Review:  I have reviewed the labs, radiology results, and diagnostic studies.    Laboratory Data:   Results from last 7 days   Lab Units 05/24/23  0511 05/23/23  2212 05/23/23  1706 05/23/23  0552 05/22/23  0600 05/21/23  1851 05/21/23  0605 05/20/23  2021 05/20/23  0625 05/19/23  0246   SODIUM mmol/L 138  --   --  139 135*  --  136  --  136 131*   POTASSIUM mmol/L 4.3 3.8 3.6 3.4* 3.9   < > 3.6   < > 3.0* 3.7   CHLORIDE mmol/L 95*  --   --  96* 97*  --  98  --  98 96*   CO2 mmol/L 31.0*  --   --  34.0* 27.0  --  28.0  --  27.0 23.0   BUN mg/dL 10  --   --  10 9  --  10  --  12 15   CREATININE mg/dL 0.96  --   --  0.88 0.90  --  0.98  --  1.15* 1.12*   GLUCOSE mg/dL 244*  --   --  156* 228*  --  236*  --  198* 181*   CALCIUM mg/dL 9.2  --   --  9.7 9.1  --  8.9  --  8.8 8.5*   BILIRUBIN mg/dL  --   --   --   --   --   --  0.6  --  0.6 0.6   ALK PHOS U/L  --   --   --   --   --   --  147*  --  105 90   ALT (SGPT) U/L  --   --   --   --   --   --  12  --  12 13   AST (SGOT) U/L  --   --   --   --   --   --  18  --  15 16   ANION GAP mmol/L 12.0  --   --  9.0 11.0  --  10.0  --  11.0 12.0    < > = values in this interval not displayed.     Estimated Creatinine Clearance: 85.8 mL/min (by C-G formula based on SCr of 0.96 mg/dL).  Results from last 7 days   Lab Units 05/23/23  3371  05/23/23  1706 05/21/23  0605   MAGNESIUM mg/dL 1.7 1.6 1.8         Results from last 7 days   Lab Units 05/25/23  0534 05/24/23  0511 05/23/23  0552 05/22/23  0600 05/21/23  0605   WBC 10*3/mm3 17.37* 13.22* 14.56* 14.84* 16.28*   HEMOGLOBIN g/dL 11.2* 12.0 11.7* 11.8* 10.9*   HEMATOCRIT % 35.2 36.5 35.3 34.2 33.1*   PLATELETS 10*3/mm3 643* 491* 506* 401 395           Culture Data:   No results found for: BLOODCX  No results found for: URINECX  No results found for: RESPCX  No results found for: WOUNDCX  No results found for: STOOLCX  No components found for: BODYFLD    Radiology Data:   Imaging Results (Last 24 Hours)     ** No results found for the last 24 hours. **          I have utilized all available immediate resources to obtain, update, or review the patient's current medications (including all prescriptions, over-the-counter products, herbals, cannabis/cannabidiol products, and vitamin/mineral/dietary (nutritional) supplements).       Assessment/Plan     Active Hospital Problems    Diagnosis    • **Sepsis without acute organ dysfunction, due to unspecified organism        Plan:  1.  Right lower extremity cellulitis: Continues to improve.  Continue empiric antibiotics to complete course.  Wound care following.  2.  Urinary retention: Appreciate urology help.  Plan cystoscopy on Friday.  3.  Hypertension: Well-controlled.  4.  Coronary artery disease: Continue home medication.  5.  Diabetes mellitus: Glucose readings better after the addition of a.m. Levemir.  6.  DVT prophylaxis: Heparin.      Medical Decision Making  Number and Complexity of problems: Several highly complex medical problems    Conditions and Status:        Condition is improving.       Discussed with: Patient and      Treatment Plan  As above    Care Planning  Shared decision making: Patient in agreement with plan of care  Code status and discussions: Full code    Disposition  Social Determinants of Health that impact treatment or  disposition: None  I expect the patient to be discharged to home in 1-2 days.       The patient was evaluated during the global COVID-19 pandemic, and the diagnosis was suspected/considered upon their initial presentation.  Evaluation, treatment, and testing were consistent with current guidelines for patients who present with complaints or symptoms that may be related to COVID-19.    I confirmed that the patient's Advance Care Plan is present, code status is documented, or surrogate decision maker is listed in the patient's medical record.        This document has been electronically signed by Mahin Esteves MD on May 25, 2023 17:18 CDT

## 2023-05-25 NOTE — PLAN OF CARE
Goal Outcome Evaluation:  Plan of Care Reviewed With: patient        Progress: improving  Outcome Evaluation: Pt resting in bed at this time, pain controlled with medication and rest, pt having phantom pains to left leg this shift, no falls noted, no new skin injuries noted, Pt to have cystoscopy in AM,

## 2023-05-25 NOTE — THERAPY TREATMENT NOTE
Patient Name: Ariana Martinez  : 1960    MRN: [id number]                              Today's Date: 2023       Admit Date: 2023    Visit Dx:     ICD-10-CM ICD-9-CM   1. Sepsis without acute organ dysfunction, due to unspecified organism  A41.9 038.9     995.91   2. Cellulitis of right lower extremity  L03.115 682.6   3. Type 2 diabetes mellitus with hyperglycemia, unspecified whether long term insulin use  E11.65 250.00   4. Impaired mobility and activities of daily living  Z74.09 V49.89    Z78.9    5. Impaired physical mobility  Z74.09 781.99   6. Impaired mobility and ADLs  Z74.09 V49.89    Z78.9      Patient Active Problem List   Diagnosis   • 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, with long-term current use of insulin   • Closed nondisplaced fracture of fifth left metatarsal bone   • MAURICIO (generalized anxiety disorder)   • Depression, major, recurrent, moderate (HCC)   • GERD without esophagitis   • Long term prescription opiate use   • Mixed hyperlipidemia   • Vitamin D deficiency   • Seasonal allergic rhinitis   • Restrictive lung disease secondary to obesity   • Adult body mass index 37.0-37.9   • Snoring   • Class 3 severe obesity due to excess calories without serious comorbidity with body mass index (BMI) of 40.0 to 44.9 in adult (HCC)   • (HFpEF) heart failure with preserved ejection fraction   • Type 2 diabetes mellitus with hyperglycemia, with long-term current use of insulin (HCC)   • Cyanocobalamin deficiency   • Coronary artery disease of native artery of native heart with stable angina pectoris   • Hypertension   • Meniere's disease   • Gastroparesis   • Pulmonary hypertension (HCC)   • Pes cavus   • Primary osteoarthritis involving multiple joints   • Generalized anxiety disorder   • Chronic right-sided low back pain with right-sided sciatica   • Chest pain   • Adverse effect of iron   • Chest pain due to myocardial ischemia   • Nonrheumatic tricuspid  valve regurgitation   • Iron deficiency anemia due to chronic blood loss   • Malaise and fatigue   • Ankle arthritis   • Urinary retention   • Endocarditis   • Essential hypertension   • Occlusion and stenosis of bilateral carotid arteries   • S/P BKA (below knee amputation) unilateral, left (HCC)   • Phantom pain after amputation of lower extremity   • S/P CABG (coronary artery bypass graft)   • Severe malnutrition   • TIA (transient ischemic attack)   • Coronary artery abnormality   • Elevated d-dimer   • Neuropathy   • Chest pain, unspecified type   • Acute pain of right shoulder   • Rotator cuff syndrome, right   • Acquired hypothyroidism   • Bilateral leg edema   • Left elbow pain   • Gastritis   • Olecranon bursitis, left elbow   • Sepsis without acute organ dysfunction, due to unspecified organism     Past Medical History:   Diagnosis Date   • Acute bacterial endocarditis 3/13/2021   • Angina, class IV    • Anxiety    • Anxiety and depression    • Arthritis    • Benign paroxysmal positional vertigo    • Bladder disorder     has bladder stimulator   • Carpal tunnel syndrome    • CHF (congestive heart failure)    • Chronic pain    • Coronary atherosclerosis     hx CABG 2005.  is followed by Dr Kwon   • Depression    • Diabetes mellitus     Type 2, controlled   • Diabetic polyneuropathy    • Disease of thyroid gland    • Elevated cholesterol    • Female stress incontinence    • Foot pain, left    • Full dentures    • Gastroparesis    • GERD (gastroesophageal reflux disease)    • Hyperlipidemia    • Hypertension    • Low back pain    • Malaise and fatigue    • Multiple joint pain    • Obesity     Refuses to be weighed   • Occlusion and stenosis of bilateral carotid arteries 6/18/2021   • Otalgia     Both   • Perforation of tympanic membrane     Left   • Postoperative wound infection    • Vitamin D deficiency    • Wears glasses     reading     Past Surgical History:   Procedure Laterality Date   • ABDOMINAL  SURGERY     • AMPUTATION FOOT     • ANGIOPLASTY      coronary   • ANKLE ARTHROSCOPY Left 02/26/2021    Procedure: Left foot hardware removal, ankle arthroscopy, bone grafting , left foot exostectomy;  Surgeon: Ignacio Lord DPM;  Location: [facility] OR;  Service: Podiatry;  Laterality: Left;   • BREAST BIOPSY Right    • CARDIAC CATHETERIZATION     • CARDIAC CATHETERIZATION N/A 06/20/2017    Procedure: Right Heart Cath;  Surgeon: Can Kwon MD PhD;  Location: [facility] CATH INVASIVE LOCATION;  Service:    • CARDIAC CATHETERIZATION N/A 02/18/2020    Procedure: Left Heart Cath;  Surgeon: Catalina Cooper MD;  Location: [facility] CATH INVASIVE LOCATION;  Service: Cardiology;  Laterality: N/A;   • CARDIAC CATHETERIZATION N/A 04/06/2022    Procedure: Left Heart Cath;  Surgeon: Sheryl Navas MD;  Location: [facility] CATH INVASIVE LOCATION;  Service: Cardiology;  Laterality: N/A;   • CARPAL TUNNEL RELEASE     • CHOLECYSTECTOMY     • COLONOSCOPY N/A 06/24/2020    Procedure: COLONOSCOPY;  Surgeon: Julián Maldonado MD;  Location: [facility] ENDOSCOPY;  Service: Gastroenterology;  Laterality: N/A;   • CORONARY ARTERY BYPASS GRAFT  2005   • ENDOSCOPY N/A 10/19/2018    Procedure: ESOPHAGOGASTRODUODENOSCOPY possible dilation;  Surgeon: Julián Maldonado MD;  Location: [facility] ENDOSCOPY;  Service: Gastroenterology   • ENDOSCOPY N/A 06/24/2020    Procedure: ESOPHAGOGASTRODUODENOSCOPY WED appt please;  Surgeon: Julián Maldonado MD;  Location: [facility] ENDOSCOPY;  Service: Gastroenterology;  Laterality: N/A;   • ENDOSCOPY N/A 06/10/2022    Procedure: ESOPHAGOGASTRODUODENOSCOPY   room 380;  Surgeon: Jeremiah Wilkins MD;  Location: [facility] ENDOSCOPY;  Service: Gastroenterology;  Laterality: N/A;   • ENDOSCOPY N/A 1/10/2023    Procedure: ESOPHAGOGASTRODUODENOSCOPY;  Surgeon: Jeremiah Wilkins MD;  Location: [facility] ENDOSCOPY;  Service: Gastroenterology;  Laterality: N/A;   • ENDOSCOPY AND COLONOSCOPY     • FOOT SURGERY      Toes   •  FOOT SURGERY     • GASTRIC BANDING      Revision, laparoscopic   • HYSTERECTOMY     • INCISION AND DRAINAGE LEG Left 03/12/2021    Procedure: Left ankle arthroscopic irrigation and debridement, screw removal;  Surgeon: Ignacio Lord DPM;  Location: [facility];  Service: Podiatry;  Laterality: Left;   • MOUTH SURGERY     • SALPINGO OOPHORECTOMY     • SHOULDER SURGERY     • SUBTALAR ARTHRODESIS Left 01/16/2019    Procedure: LEFT FOOT HARDWARE REMOVAL, FIFTH METATARSAL , OPEN REDUCTION INTERNAL FIXATION, CALCANEAL OSTEOTOMY;  Surgeon: Ignacio Lord DPM;  Location: [facility];  Service: Podiatry   • SUBTALAR ARTHRODESIS Left 10/16/2019    Procedure: foot hardware removal, subtalar joint fusion  possible de/reattachment of achilles tendon        (c-arm);  Surgeon: Ignacio Lord DPM;  Location: [facility];  Service: Podiatry   • SUBTALAR ARTHRODESIS Left 09/30/2020    Procedure: subtalar, talonavicular joint arthrodesis.  Removal hardware.          (c-arm);  Surgeon: Ignacio Lord DPM;  Location: [facility];  Service: Podiatry;  Laterality: Left;   • TRANSESOPHAGEAL ECHOCARDIOGRAM (LAMONTE)      With color flow      General Information     Row Name 05/25/23 1450          OT Time and Intention    Document Type therapy note (daily note)  -LW     Mode of Treatment individual therapy;occupational therapy  -     Row Name 05/25/23 1450          General Information    Patient Profile Reviewed yes  -LW     Existing Precautions/Restrictions fall  -     Row Name 05/25/23 1450          Cognition    Orientation Status (Cognition) oriented x 4  -LW     Row Name 05/25/23 1450          Safety Issues, Functional Mobility    Impairments Affecting Function (Mobility) endurance/activity tolerance;pain;strength  -           User Key  (r) = Recorded By, (t) = Taken By, (c) = Cosigned By    Initials Name Provider Type    LW Sarah Irby COTA Occupational Therapist Assistant                 Mobility/ADL's     Row Name  05/25/23 1451          Bed Mobility    Bed Mobility supine-sit;sit-supine  -LW     Supine-Sit Walsh (Bed Mobility) contact guard  -LW     Sit-Supine Walsh (Bed Mobility) contact guard  -LW     Assistive Device (Bed Mobility) bed rails;head of bed elevated  -LW     Row Name 05/25/23 1451          Activities of Daily Living    BADL Assessment/Intervention bathing;grooming;upper body dressing  -LW     Row Name 05/25/23 1451          Grooming Assessment/Training    Walsh Level (Grooming) grooming skills;hair care, combing/brushing;oral care regimen;wash face, hands;standby assist  -LW     Position (Grooming) edge of bed sitting  -LW     Comment, (Grooming) Pan washed hair  -LW     Row Name 05/25/23 1451          Bathing Assessment/Intervention    [place]als[place] Level (Bathing) lower body;upper body;standby assist  -     Position (Bathing) edge of bed sitting  -LW     Row Name 05/25/23 1451          Upper Body Dressing Assessment/Training    Walsh Level (Upper Body Dressing) upper body dressing skills;doff;don;standby assist  Hospital gown  -     Position (Upper Body Dressing) edge of bed sitting  -LW           User Key  (r) = Recorded By, (t) = Taken By, (c) = Cosigned By    Initials Name Provider Type     Sarah Irby COTA Occupational Therapist Assistant               Obj/Interventions    No documentation.                Goals/Plan     Row Name 05/25/23 0933          Transfer Goal 1 (OT)    Activity/Assistive Device (Transfer Goal 1, OT) sit-to-stand/stand-to-sit;toilet;wheelchair transfer;bed-to-chair/chair-to-bed  -LW     [place] Level/Cues Needed (Transfer Goal 1, OT) contact guard required  -LW     Time Frame (Transfer Goal 1, OT) long term goal (LTG)  -LW     Row Name 05/25/23 0933          Bathing Goal 1 (OT)    Activity/Device (Bathing Goal 1, OT) bathing skills, all  -LW     [place] Level/Cues Needed (Bathing Goal 1, OT) contact guard required  -LW     Time Frame  (Bathing Goal 1, OT) long term goal (LTG)  -LW     Progress/Outcomes (Bathing Goal 1, OT) goal not met  -LW     Row Name 05/25/23 0933          Dressing Goal 1 (OT)    Activity/Device (Dressing Goal 1, OT) dressing skills, all  -LW     [place]/Cues Needed (Dressing Goal 1, OT) contact guard required  -LW     Time Frame (Dressing Goal 1, OT) long term goal (LTG)  -LW     Progress/Outcome (Dressing Goal 1, OT) goal not met  -LW     Row Name 05/25/23 0933          Toileting Goal 1 (OT)    Activity/Device (Toileting Goal 1, OT) toileting skills, all  -LW     [place] Level/Cues Needed (Toileting Goal 1, OT) minimum assist (75% or more patient effort)  -LW     Time Frame (Toileting Goal 1, OT) long term goal (LTG)  -LW     Progress/Outcome (Toileting Goal 1, OT) goal not met  -LW     Row Name 05/25/23 0933          Self-Feeding Goal 1 (OT)    Activity/Device (Self-Feeding Goal 1, OT) self-feeding skills, all  -LW     [place] Level/Cues Needed (Self-Feeding Goal 1, OT) modified independence  -LW     Time Frame (Self-Feeding Goal 1, OT) long term goal (LTG)  -LW     Progress/Outcomes (Self-Feeding Goal 1, OT) goal met  -LW           User Key  (r) = Recorded By, (t) = Taken By, (c) = Cosigned By    Initials Name Provider Type    LW Sarah Irby COTA Occupational Therapist Assistant               Clinical Impression     Row Name 05/25/23 1459          Pain Assessment    Pretreatment Pain Rating 6/10  -LW     Posttreatment Pain Rating 6/10  -LW     Pain Location - Side/Orientation Right  -LW     Pain Location lower  -LW     Pain Location - extremity  -LW     Row Name 05/25/23 3783          Plan of Care Review    Plan of Care Reviewed With patient  -LW     Progress improving  -LW     Outcome Evaluation Pt supine in bed. Supine to sit<>sit to supine CGA. Pt sitting EOB while Performing UB and LB bathing and dressing with SBA. Grooming SBA. Pt needing increased time for tasks. Pt supine in bed all needs in  reach. Pt would continue to benefit from OT services.  -     Row Name 05/25/23 1455          Positioning and Restraints    Pre-Treatment Position in bed  -LW     Post Treatment Position bed  -LW     In Bed supine;call light within reach;encouraged to call for assist;exit alarm on;with family/caregiver  -           User Key  (r) = Recorded By, (t) = Taken By, (c) = Cosigned By    Initials Name Provider Type     Sarah Irby COTA Occupational Therapist Assistant               Outcome Measures     Row Name 05/25/23 1459          How much help from another is currently needed...    Putting on and taking off regular lower body clothing? 2  -LW     Bathing (including washing, rinsing, and drying) 3  -LW     Toileting (which includes using toilet bed pan or urinal) 3  -LW     Putting on and taking off regular upper body clothing 3  -LW     Taking care of personal grooming (such as brushing teeth) 4  -LW     Eating meals 4  -LW     AM-PAC 6 Clicks Score (OT) 19  -LW     Row Name 05/25/23 1346 05/25/23 0841       How much help from another person do you currently need...    Turning from your back to your side while in flat bed without using bedrails? 3  -AME 3  -JH    Moving from lying on back to sitting on the side of a flat bed without bedrails? 3  -AME 3  -JH    Moving to and from a bed to a chair (including a wheelchair)? 2  -AME 2  -JH    Standing up from a chair using your arms (e.g., wheelchair, bedside chair)? 3  -AME 3  -JH    Climbing 3-5 steps with a railing? 1  -AME 1  -JH    To walk in hospital room? 1  -AME 1  -JH    AM-PAC 6 Clicks Score (PT) 13  -AME 13  -JH    Highest level of mobility 4 --> Transferred to chair/commode  - 4 --> Transferred to chair/commode  -    Row Name 05/25/23 1346          Functional Assessment    Outcome Measure Options AM-PAC 6 Clicks Basic Mobility (PT)  -           User Key  (r) = Recorded By, (t) = Taken By, (c) = Cosigned By    Initials Name Provider Type    AME Zhang  Ousmane MUNIZ, MANJINDER Physical Therapist Assistant    Sarah Davies COTA Occupational Therapist Assistant    Janae Severino, RADHAN Licensed Nurse                Occupational Therapy Education     Title: PT OT SLP Therapies (Done)     Topic: Occupational Therapy (Done)     Point: ADL training (Done)     Description:   Instruct learner(s) on proper safety adaptation and remediation techniques during self care or transfers.   Instruct in proper use of assistive devices.              Learning Progress Summary           Patient Acceptance, E,TB, VU by LW at 5/25/2023 1459    Acceptance, E,TB, VU by RW at 5/22/2023 1243    Comment: POC, Role of OT    Acceptance, E,TB, VU by BB at 5/20/2023 1358    Acceptance, E,TB, VU by BB at 5/20/2023 1357                   Point: Home exercise program (Done)     Description:   Instruct learner(s) on appropriate technique for monitoring, assisting and/or progressing therapeutic exercises/activities.              Learning Progress Summary           Patient Acceptance, E,TB, VU by LW at 5/25/2023 1459    Acceptance, E,TB, VU by BB at 5/20/2023 1356                   Point: Precautions (Done)     Description:   Instruct learner(s) on prescribed precautions during self-care and functional transfers.              Learning Progress Summary           Patient Acceptance, E,TB, VU by LW at 5/25/2023 1459    Acceptance, E,TB, VU by RW at 5/22/2023 1243    Comment: POC, Role of OT    Acceptance, E, DU by RB at 5/18/2023 1352    Comment: Pt edu on use of gait belt and non skid socks when OOB and no OOB without assist.                   Point: Body mechanics (Done)     Description:   Instruct learner(s) on proper positioning and spine alignment during self-care, functional mobility activities and/or exercises.              Learning Progress Summary           Patient Acceptance, E,TB, VU by LW at 5/25/2023 1459    Acceptance, E,TB, VU by RW at 5/22/2023 1243    Comment: POC, Role of OT     Acceptance, E,TB, VU by BB at 5/20/2023 1358    Acceptance, E,TB, VU by BB at 5/20/2023 1357                               User Key     Initials Effective Dates Name Provider Type Discipline    RB 06/16/21 -  Fredi France, OT Occupational Therapist OT    BB 06/16/21 -  Matilde Rios COTA Occupational Therapist Assistant OT    LW 06/16/21 -  Sarah Irby COTA Occupational Therapist Assistant OT    RW 09/22/22 -  Missy Esteves OT Occupational Therapist OT              OT Recommendation and Plan     Plan of Care Review  Plan of Care Reviewed With: patient  Progress: improving  Outcome Evaluation: Pt supine in bed. Supine to sit<>sit to supine CGA. Pt sitting EOB while Performing UB and LB bathing and dressing with SBA. Grooming SBA. Pt needing increased time for tasks. Pt supine in bed all needs in reach. Pt would continue to benefit from OT services.     Time Calculation:    Time Calculation- OT     Row Name 05/25/23 1501             Time Calculation- OT    OT Start Time 0933  -LW      OT Stop Time 1045  -LW      OT Time Calculation (min) 72 min  -LW      Total Timed Code Minutes- OT 72 minute(s)  -LW         Timed Charges    99088 - OT Self Care/Mgmt Minutes 72  -LW         Total Minutes    Timed Charges Total Minutes 72  -LW       Total Minutes 72  -LW            User Key  (r) = Recorded By, (t) = Taken By, (c) = Cosigned By    Initials Name Provider Type    LW Sarah Irby COTA Occupational Therapist Assistant              Therapy Charges for Today     Code Description Service Date Service Provider Modifiers Qty    50866016932 HC OT SELF CARE/MGMT/TRAIN EA 15 MIN 5/25/2023 Sarah Irby COTA GO 5               LINDA Booth  5/25/2023

## 2023-05-25 NOTE — PROGRESS NOTES
"Adult Nutrition  Assessment    Patient Name:  Ariana Martinez  YOB: 1960  MRN: [id number]  Admit Date:  5/17/2023    Assessment Date:  5/25/2023    Comments:  Pt continues treatment for RLE cellulitis. Pt is being seen by urology for urinary retention. Pt notes to have wounds x2 on toes of right foot, partial amputation of left foot. ADA diet, intakes >75% for LOS. Epic notes wts in Mar/April 2023- 255# and # w/ lasix in place and  2+ edema RUE/RLE. Alb 2.8L on admit. Last BM noted 5/14 in flowsheet and bowel regimen initiated on 5/20. RN is aware and states \"we are on it\".  RD continues w/ has no nutritional concerns at this time. RD to follow hospital course.          Electronically signed by:  Juany Rai RD  05/25/23 13:49 CDT  "

## 2023-05-25 NOTE — PROGRESS NOTES
"   LOS: 8 days   Patient Care Team:  Dolly Foss APRN as PCP - General (Family Medicine)  Uziel Alonso MD as Consulting Physician (Hematology and Oncology)  Elvis Gamboa APRN as Nurse Practitioner (Endocrinology)  Teressa Hammond APRN as Nurse Practitioner (Oncology)    Subjective     Subjective:  Symptoms:  Stable.        History taken from: patient chart    Objective     Vital Signs  Temp:  [96.1 °F (35.6 °C)-98.2 °F (36.8 °C)] 97.8 °F (36.6 °C)  Heart Rate:  [69-84] 69  Resp:  [18] 18  BP: (107-147)/(51-66) 130/51    Objective:  General Appearance:  In no acute distress.    Vital signs: (most recent): Blood pressure 130/51, pulse 69, temperature 97.8 °F (36.6 °C), temperature source Temporal, resp. rate 18, height 172.7 cm (68\"), weight 125 kg (276 lb 3.2 oz), SpO2 96 %, not currently breastfeeding.  Vital signs are normal.  No fever.    Output: Producing urine.    Lungs:  Normal effort and normal respiratory rate.    Abdomen: Abdomen is soft and non-distended.  There is no abdominal tenderness.     Neurological: Patient is alert.    Skin:  Warm, dry and pale.              Results Review:    Lab Results (last 24 hours)     Procedure Component Value Units Date/Time    POC Glucose Once [[id number]]  (Abnormal) Collected: 05/25/23 1111    Specimen: Blood Updated: 05/25/23 1130     Glucose 307 mg/dL      Comment: RN NotifiedOperator: [id number] BELLA KESSLERANNAMeter ID: [id number]       POC Glucose Once [[id number]]  (Abnormal) Collected: 05/25/23 0741    Specimen: Blood Updated: 05/25/23 0755     Glucose 228 mg/dL      Comment: RN NotifiedOperator: [id number] BELLA KESSLERANNAMeter ID: [id number]       Scan Slide [[id number]] Collected: 05/25/23 0534    Specimen: Blood Updated: 05/25/23 0719     RBC Morphology Normal     Toxic Granulation Slight/1+     Platelet Estimate Increased    Extra Tubes [[id number]] Collected: 05/25/23 0534    Specimen: Blood, Venous Line Updated: 05/25/23 0646    Narrative: "      The following orders were created for panel order Extra Tubes.  Procedure                               Abnormality         Status                     ---------                               -----------         ------                     Green Top (Gel)[[id number]]                                  Final result                 Please view results for these tests on the individual orders.    Green Top (Gel) [[id number]] Collected: 05/25/23 0534    Specimen: Blood Updated: 05/25/23 0646     Extra Tube Hold for add-ons.     Comment: Auto resulted.       CBC Auto Differential [[id number]]  (Abnormal) Collected: 05/25/23 0534    Specimen: Blood Updated: 05/25/23 0644     WBC 17.37 10*3/mm3      RBC 4.03 10*6/mm3      Hemoglobin 11.2 g/dL      Hematocrit 35.2 %      MCV 87.3 fL      MCH 27.8 pg      MCHC 31.8 g/dL      RDW 13.3 %      RDW-SD 43.0 fl      MPV 9.2 fL      Platelets 643 10*3/mm3      Neutrophil % 78.8 %      Lymphocyte % 9.7 %      Monocyte % 5.9 %      Eosinophil % 2.1 %      Basophil % 0.6 %      Immature Grans % 2.9 %      Neutrophils, Absolute 13.69 10*3/mm3      Lymphocytes, Absolute 1.69 10*3/mm3      Monocytes, Absolute 1.02 10*3/mm3      Eosinophils, Absolute 0.36 10*3/mm3      Basophils, Absolute 0.10 10*3/mm3      Immature Grans, Absolute 0.51 10*3/mm3      nRBC 0.0 /100 WBC     POC Glucose Once [[id number]]  (Abnormal) Collected: 05/24/23 1905    Specimen: Blood Updated: 05/24/23 2105     Glucose 205 mg/dL      Comment: RN NotifiedOperator: [id number] Ponce RENEEMeter ID: [id number]       POC Glucose Once [[id number]]  (Abnormal) Collected: 05/24/23 1617    Specimen: Blood Updated: 05/24/23 1642     Glucose 182 mg/dL      Comment: RN NotifiedOperator: [id number] ELISABET WESTBROOKINAMeter ID: [id number]       POC Glucose Once [[id number]]  (Abnormal) Collected: 05/24/23 1055    Specimen: Blood Updated: 05/24/23 1641     Glucose 304 mg/dL      Comment: RN NotifiedOperator: [id number] ELISABET  Pierre ID: [id number]            Imaging Results (Last 24 Hours)     ** No results found for the last 24 hours. **           I reviewed the patient's new clinical results.  I reviewed the patient's new imaging results and agree with the interpretation.  I reviewed the patient's other test results and agree with the interpretation      Assessment & Plan       Sepsis without acute organ dysfunction, due to unspecified organism      Assessment & Plan    Consulted to Urology for retention of urine, Jose has been anchored, blood culture no growth, UA negative for signs of UTI. Being treated for skin/soft tissue infection. Hemoglobin A1C 9.8, TSH 1.17 but last month 6.76,   --Estimated Creatinine Clearance: 85.8 mL/min (by C-G formula based on SCr of 0.96 mg/dL).     Plan:  Continue antibiotic and Jose  Plan cystoscopy in 1-2 days    BEBE Jang  05/25/23  13:04 CDT

## 2023-05-25 NOTE — THERAPY TREATMENT NOTE
Acute Care - Physical Therapy Treatment Note  [facility]     Patient Name: Ariana Martinez  : 1960  MRN: [id number]  Today's Date: 2023      Visit Dx:     ICD-10-CM ICD-9-CM   1. Sepsis without acute organ dysfunction, due to unspecified organism  A41.9 038.9     995.91   2. Cellulitis of right lower extremity  L03.115 682.6   3. Type 2 diabetes mellitus with hyperglycemia, unspecified whether long term insulin use  E11.65 250.00   4. Impaired mobility and activities of daily living  Z74.09 V49.89    Z78.9    5. Impaired physical mobility  Z74.09 781.99   6. Impaired mobility and ADLs  Z74.09 V49.89    Z78.9      Patient Active Problem List   Diagnosis   • 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, with long-term current use of insulin   • Closed nondisplaced fracture of fifth left metatarsal bone   • MAURICIO (generalized anxiety disorder)   • Depression, major, recurrent, moderate (HCC)   • GERD without esophagitis   • Long term prescription opiate use   • Mixed hyperlipidemia   • Vitamin D deficiency   • Seasonal allergic rhinitis   • Restrictive lung disease secondary to obesity   • Adult body mass index 37.0-37.9   • Snoring   • Class 3 severe obesity due to excess calories without serious comorbidity with body mass index (BMI) of 40.0 to 44.9 in adult (HCC)   • (HFpEF) heart failure with preserved ejection fraction   • Type 2 diabetes mellitus with hyperglycemia, with long-term current use of insulin ([facility])   • Cyanocobalamin deficiency   • Coronary artery disease of native artery of native heart with stable angina pectoris   • Hypertension   • Meniere's disease   • Gastroparesis   • Pulmonary hypertension ([facility])   • Pes cavus   • Primary osteoarthritis involving multiple joints   • Generalized anxiety disorder   • Chronic right-sided low back pain with right-sided sciatica   • Chest pain   • Adverse effect of iron   • Chest pain due to myocardial ischemia   • Nonrheumatic  tricuspid valve regurgitation   • Iron deficiency anemia due to chronic blood loss   • Malaise and fatigue   • Ankle arthritis   • Urinary retention   • Endocarditis   • Essential hypertension   • Occlusion and stenosis of bilateral carotid arteries   • S/P BKA (below knee amputation) unilateral, left (HCC)   • Phantom pain after amputation of lower extremity   • S/P CABG (coronary artery bypass graft)   • Severe malnutrition   • TIA (transient ischemic attack)   • Coronary artery abnormality   • Elevated d-dimer   • Neuropathy   • Chest pain, unspecified type   • Acute pain of right shoulder   • Rotator cuff syndrome, right   • Acquired hypothyroidism   • Bilateral leg edema   • Left elbow pain   • Gastritis   • Olecranon bursitis, left elbow   • Sepsis without acute organ dysfunction, due to unspecified organism     Past Medical History:   Diagnosis Date   • Acute bacterial endocarditis 3/13/2021   • Angina, class IV    • Anxiety    • Anxiety and depression    • Arthritis    • Benign paroxysmal positional vertigo    • Bladder disorder     has bladder stimulator   • Carpal tunnel syndrome    • CHF (congestive heart failure)    • Chronic pain    • Coronary atherosclerosis     hx CABG 2005.  is followed by Dr Kwon   • Depression    • Diabetes mellitus     Type 2, controlled   • Diabetic polyneuropathy    • Disease of thyroid gland    • Elevated cholesterol    • Female stress incontinence    • Foot pain, left    • Full dentures    • Gastroparesis    • GERD (gastroesophageal reflux disease)    • Hyperlipidemia    • Hypertension    • Low back pain    • Malaise and fatigue    • Multiple joint pain    • Obesity     Refuses to be weighed   • Occlusion and stenosis of bilateral carotid arteries 6/18/2021   • Otalgia     Both   • Perforation of tympanic membrane     Left   • Postoperative wound infection    • Vitamin D deficiency    • Wears glasses     reading     Past Surgical History:   Procedure Laterality Date   •  ABDOMINAL SURGERY     • AMPUTATION FOOT     • ANGIOPLASTY      coronary   • ANKLE ARTHROSCOPY Left 02/26/2021    Procedure: Left foot hardware removal, ankle arthroscopy, bone grafting , left foot exostectomy;  Surgeon: Ignacio Lord DPM;  Location: [facility] OR;  Service: Podiatry;  Laterality: Left;   • BREAST BIOPSY Right    • CARDIAC CATHETERIZATION     • CARDIAC CATHETERIZATION N/A 06/20/2017    Procedure: Right Heart Cath;  Surgeon: Can Kwon MD PhD;  Location: [facility] CATH INVASIVE LOCATION;  Service:    • CARDIAC CATHETERIZATION N/A 02/18/2020    Procedure: Left Heart Cath;  Surgeon: Catalina Cooper MD;  Location: [facility] CATH INVASIVE LOCATION;  Service: Cardiology;  Laterality: N/A;   • CARDIAC CATHETERIZATION N/A 04/06/2022    Procedure: Left Heart Cath;  Surgeon: Sheryl Navas MD;  Location: [facility] CATH INVASIVE LOCATION;  Service: Cardiology;  Laterality: N/A;   • CARPAL TUNNEL RELEASE     • CHOLECYSTECTOMY     • COLONOSCOPY N/A 06/24/2020    Procedure: COLONOSCOPY;  Surgeon: Julián Maldonado MD;  Location: [facility] ENDOSCOPY;  Service: Gastroenterology;  Laterality: N/A;   • CORONARY ARTERY BYPASS GRAFT  2005   • ENDOSCOPY N/A 10/19/2018    Procedure: ESOPHAGOGASTRODUODENOSCOPY possible dilation;  Surgeon: Julián Maldonado MD;  Location: [facility] ENDOSCOPY;  Service: Gastroenterology   • ENDOSCOPY N/A 06/24/2020    Procedure: ESOPHAGOGASTRODUODENOSCOPY WED appt please;  Surgeon: Julián Maldonado MD;  Location: [facility] ENDOSCOPY;  Service: Gastroenterology;  Laterality: N/A;   • ENDOSCOPY N/A 06/10/2022    Procedure: ESOPHAGOGASTRODUODENOSCOPY   room 380;  Surgeon: Jeremiah Wilkins MD;  Location: [facility] ENDOSCOPY;  Service: Gastroenterology;  Laterality: N/A;   • ENDOSCOPY N/A 1/10/2023    Procedure: ESOPHAGOGASTRODUODENOSCOPY;  Surgeon: Jeremiah Wilkins MD;  Location: [facility] ENDOSCOPY;  Service: Gastroenterology;  Laterality: N/A;   • ENDOSCOPY AND COLONOSCOPY     • FOOT SURGERY       Toes   • FOOT SURGERY     • GASTRIC BANDING      Revision, laparoscopic   • HYSTERECTOMY     • INCISION AND DRAINAGE LEG Left 03/12/2021    Procedure: Left ankle arthroscopic irrigation and debridement, screw removal;  Surgeon: Ignacio Lord DPM;  Location: [facility];  Service: Podiatry;  Laterality: Left;   • MOUTH SURGERY     • SALPINGO OOPHORECTOMY     • SHOULDER SURGERY     • SUBTALAR ARTHRODESIS Left 01/16/2019    Procedure: LEFT FOOT HARDWARE REMOVAL, FIFTH METATARSAL , OPEN REDUCTION INTERNAL FIXATION, CALCANEAL OSTEOTOMY;  Surgeon: Ignacio Lord DPM;  Location: [facility];  Service: Podiatry   • SUBTALAR ARTHRODESIS Left 10/16/2019    Procedure: foot hardware removal, subtalar joint fusion  possible de/reattachment of achilles tendon        (c-arm);  Surgeon: Ignacio Lord DPM;  Location: [facility];  Service: Podiatry   • SUBTALAR ARTHRODESIS Left 09/30/2020    Procedure: subtalar, talonavicular joint arthrodesis.  Removal hardware.          (c-arm);  Surgeon: Ignacio Lord DPM;  Location: [facility];  Service: Podiatry;  Laterality: Left;   • TRANSESOPHAGEAL ECHOCARDIOGRAM (LAMONTE)      With color flow     PT Assessment (last 12 hours)     PT Evaluation and Treatment     Row Name 05/25/23 1346          Physical Therapy Time and Intention    Document Type therapy note (daily note)  -     Mode of Treatment physical therapy  -AME     Patient Effort good  -     Row Name 05/25/23 1346          General Information    Patient Profile Reviewed yes  -     Existing Precautions/Restrictions fall  -     Row Name 05/25/23 1346          Pain    Pretreatment Pain Rating 8/10  -     Posttreatment Pain Rating 7/10  -     Pain Location - Side/Orientation Right  -     Pain Location lower  -     Pain Location - extremity  -     Pain Intervention(s) Repositioned  -     Row Name 05/25/23 1346          Cognition    Affect/Mental Status (Cognition) WFL  -     Orientation Status (Cognition)  oriented x 4  -     Personal Safety Interventions fall prevention program maintained;gait belt;nonskid shoes/slippers when out of bed;muscle strengthening facilitated;supervised activity  -     Row Name 05/25/23 1346          Bed Mobility    Bed Mobility supine-sit;sit-supine;scooting/bridging  -     Rolling Left [place] (Bed Mobility) --  -AME     Rolling Right [place] (Bed Mobility) --  -AME     Scooting/Bridging [place] (Bed Mobility) contact guard  -AME     Supine-Sit [place] (Bed Mobility) contact guard  -AME     Sit-Supine [place] (Bed Mobility) contact guard  -     Assistive Device (Bed Mobility) head of bed elevated;bed rails  -     Row Name 05/25/23 1346          Transfers    Transfers sit-stand transfer;stand-sit transfer;toilet transfer  -     Row Name 05/25/23 1346          Sit-Stand Transfer    Sit-Stand [place] (Transfers) minimum assist (75% patient effort)  -     Assistive Device (Sit-Stand Transfers) walker, front-wheeled  -     Row Name 05/25/23 1346          Stand-Sit Transfer    Stand-Sit [place] (Transfers) minimum assist (75% patient effort)  -     Assistive Device (Stand-Sit Transfers) walker, front-wheeled  -AME     Row Name 05/25/23 1346          Toilet Transfer    Type (Toilet Transfer) sit-stand;stand-sit  -     [place] Level (Toilet Transfer) moderate assist (50% patient effort);2 person assist  -     Assistive Device (Toilet Transfer) walker, front-wheeled  -     Comment, ([place] [facility]  -     Row Name 05/25/23 1346          Gait/Stairs (Locomotion)    Gait/Stairs Locomotion gait/ambulation independence;gait/ambulation assistive device;distance ambulated;gait pattern;gait deviations;maintains weight-bearing status  -     [place] Level (Gait) minimum assist (75% patient effort)  -     Assistive Device (Gait) walker, front-wheeled  -     Distance in Feet (Gait) 2 x2  difficulty offlaoding with UEs in order  to take a step.  -AME     Deviations/Abnormal Patterns (Gait) --  -AME     Comment, (Gait/Stairs) pt reports that her prosthesis does not fit well and needs to be worked on.  -AME     Row Name 05/25/23 1346          Safety Issues, Functional Mobility    Impairments Affecting Function (Mobility) endurance/activity tolerance;strength;pain  -AME     Row Name             Wound 05/17/23 1828 Right anterior fifth toe    Wound - Properties Group Placement Date: 05/17/23  - Placement Time: 1828  -JH Side: Right  -JH Orientation: anterior  -JH Location: fifth toe  -JH    Retired Wound - Properties Group Placement Date: 05/17/23  - Placement Time: 1828  -JH Side: Right  -JH Orientation: anterior  -JH Location: fifth toe  -JH    Retired Wound - Properties Group Date first assessed: 05/17/23  - Time first assessed: 1828  -JH Side: Right  -JH Location: fifth toe  -JH    Row Name             Wound 05/23/23 1006 Right distal leg    Wound - Properties Group Placement Date: 05/23/23  - Placement Time: 1006  -LH Present on Hospital Admission: N  -LH Side: Right  -LH Orientation: distal  -LH Location: leg  -LH    Retired Wound - Properties Group Placement Date: 05/23/23  - Placement Time: 1006  -LH Present on Hospital Admission: N  -LH Side: Right  -LH Orientation: distal  -LH Location: leg  -LH    Retired Wound - Properties Group Date first assessed: 05/23/23  - Time first assessed: 1006  -LH Present on Hospital Admission: N  -LH Side: Right  -LH Location: leg  -LH    Row Name 05/25/23 1346          Vital Signs    Pre Systolic BP Rehab 124  -AME     Pre Treatment Diastolic BP 60  -AME     Post Systolic BP Rehab 123  -AME     Post Treatment Diastolic BP 57  -AME     Pretreatment Heart Rate (beats/min) 76  -AME     Posttreatment Heart Rate (beats/min) 82  -AME     Pre SpO2 (%) 93  -AME     O2 Delivery Pre Treatment nasal cannula  -AME     Post SpO2 (%) 94  -AME     O2 Delivery Post Treatment nasal cannula  -AME     Pre Patient Position  Supine  -AME     Post Patient Position Supine  -[facility] Name 05/25/23 1346          Positioning and Restraints    Pre-Treatment Position in bed  -AME     Post Treatment Position bed  -AME     In Bed fowlers;call light within reach;exit alarm on;encouraged to call for assist  all needs met  -[facility] Name 05/25/23 1346          Therapy Assessment/Plan (PT)    Rehab Potential (PT) good, to achieve stated therapy goals  -     Criteria for Skilled Interventions Met (PT) yes;meets criteria;skilled treatment is necessary  -     Therapy Frequency (PT) other (see comments)  5-7 days/wk  -[facility] Name 05/25/23 1346          Bed Mobility Goal 1 (PT)    Activity/Assistive Device (Bed Mobility Goal 1, PT) bed mobility activities, all  -AME     [place] Level/Cues Needed (Bed Mobility Goal 1, PT) standby assist;independent  -AME     Time Frame (Bed Mobility Goal 1, PT) by discharge  -AME     Progress/Outcomes (Bed Mobility Goal 1, PT) goal met  -[facility] Name 05/25/23 1346          Transfer Goal 1 (PT)    Activity/Assistive Device (Transfer Goal 1, PT) sit-to-stand/stand-to-sit;bed-to-chair/chair-to-bed  -AME     [place] Level/Cues Needed (Transfer Goal 1, PT) contact guard required;standby assist;modified independence  -AME     Time Frame (Transfer Goal 1, PT) by discharge  -AME     Progress/Outcome (Transfer Goal 1, PT) goal not met  -[facility] Name 05/25/23 1346          Gait Training Goal 1 (PT)    Activity/Assistive Device (Gait Training Goal 1, PT) gait (walking locomotion);decrease fall risk  -AME     [place] Level (Gait Training Goal 1, PT) contact guard required;standby assist;modified independence  -AME     Distance (Gait Training Goal 1, PT) 50ft or more each trip  -AME     Time Frame (Gait Training Goal 1, PT) 1 week  -AME     Progress/Outcome (Gait Training Goal 1, PT) goal not met  -[facility] Name 05/25/23 1346          ROM Goal 1 (PT)    ROM Goal 1 (PT) Pt will tolerate LE exercises OOB in chair  with VSS  -     Time Frame (ROM Goal 1, PT) by discharge  -     Progress/Outcome (ROM Goal 1, PT) goal not met  -           User Key  (r) = Recorded By, (t) = Taken By, (c) = Cosigned By    Initials Name Provider Type     Ousmane Zhang, MANJINDER Physical Therapist Assistant     Juany Singh, RN Registered Nurse    Janae Severino LPN Licensed Nurse                Physical Therapy Education     Title: PT OT SLP Therapies (In Progress)     Topic: Physical Therapy (In Progress)     Point: Mobility training (In Progress)     Learning Progress Summary           Patient Acceptance, E, NR by  at 5/24/2023 1306    Acceptance, E,TB, VU by NB at 5/18/2023 2138    Acceptance, E, NR by [facility] at 5/18/2023 1407                   Point: Home exercise program (Not Started)     Learner Progress:  Not documented in this visit.          Point: Body mechanics (Not Started)     Learner Progress:  Not documented in this visit.          Point: Precautions (In Progress)     Learning Progress Summary           Patient Acceptance, E, NR by [facility] at 5/18/2023 1407                               User Key     Initials Effective Dates Name Provider Type Discipline    [facility] 06/16/21 -  Aure Villaseñor PT Physical Therapist PT     06/16/21 -  Ousmane Zhang PTA Physical Therapist Assistant PT    NB 06/16/21 -  Samantha Cuellar RN Registered Nurse Nurse              PT Recommendation and Plan  Anticipated Discharge Disposition (PT): home with assist, home with home health  Therapy Frequency (PT): other (see comments) (5-7 days/wk)  Plan of Care Reviewed With: patient  Progress: improving  Outcome Evaluation: bed mobility- CGA. sit-stand-sit- min A. t/f to/from [facility] mod A x2. pt is anxious with t/fs. no new goals met at this time. pt would continue to benfeit from PT services.   Outcome Measures     Row Name 05/25/23 1346 05/24/23 1229          How much help from another person do you currently need...    Turning from your back to your  side while in flat bed without using bedrails? 3  -AME 3  -AME     Moving from lying on back to sitting on the side of a flat bed without bedrails? 3  -AME 3  -AME     Moving to and from a bed to a chair (including a wheelchair)? 2  -AME 2  -AME     Standing up from a chair using your arms (e.g., wheelchair, bedside chair)? 3  -AME 3  -AME     Climbing 3-5 steps with a railing? 1  -AME 1  -AME     To walk in hospital room? 1  -AME 1  -AME     AM-PAC 6 Clicks Score (PT) 13  -AME 13  -AME        Functional Assessment    Outcome Measure Options AM-PAC 6 Clicks Basic Mobility (PT)  -AME AM-PAC 6 Clicks Basic Mobility (PT)  -AME           User Key  (r) = Recorded By, (t) = Taken By, (c) = Cosigned By    Initials Name Provider Type    Ousmane Gomes PTA Physical Therapist Assistant                 Time Calculation:    PT Charges     Row Name 05/25/23 1415             Time Calculation    Start Time 1346  -AME      Stop Time 1416  -AME      Time Calculation (min) 30 min  -AME         Time Calculation- PT    Total Timed Code Minutes- PT 30 minute(s)  -AME         Timed Charges    37859 - PT Therapeutic Activity Minutes 30  -AME         Total Minutes    Timed Charges Total Minutes 30  -AME       Total Minutes 30  -AME            User Key  (r) = Recorded By, (t) = Taken By, (c) = Cosigned By    Initials Name Provider Type    AME Ousmane Zhang PTA Physical Therapist Assistant              Therapy Charges for Today     Code Description Service Date Service Provider Modifiers Qty    36248263966 HC PT THER PROC EA 15 MIN 5/24/2023 Ousmane Zhang PTA GP 1    34952260967 HC PT THERAPEUTIC ACT EA 15 MIN 5/24/2023 Ousmane Zhang PTA GP 2    67128552099 HC PT THERAPEUTIC ACT EA 15 MIN 5/25/2023 Ousmane Zhang PTA GP 2          PT G-Codes  Outcome Measure Options: AM-PAC 6 Clicks Basic Mobility (PT)  AM-PAC 6 Clicks Score (PT): 13  AM-PAC 6 Clicks Score (OT): 19    Ousmane Zhang PTA  5/25/2023

## 2023-05-25 NOTE — PLAN OF CARE
Problem: Adult Inpatient Plan of Care  Goal: Plan of Care Review  Outcome: Ongoing, Progressing  Flowsheets  Taken 5/25/2023 7626  Progress: improving  Plan of Care Reviewed With: patient  Taken 5/25/2023 3705  Progress: improving  Plan of Care Reviewed With: patient  Outcome Evaluation: Pt supine in bed. Supine to sit<>sit to supine CGA. Pt sitting EOB while Performing UB and LB bathing and dressing with SBA. Grooming SBA. Pt needing increased time for tasks. Pt supine in bed all needs in reach. Pt would continue to benefit from OT services.   Goal Outcome Evaluation:  Plan of Care Reviewed With: patient        Progress: improving  Outcome Evaluation: Pt supine in bed. Supine to sit<>sit to supine CGA. Pt sitting EOB while Performing UB and LB bathing and dressing with SBA. Grooming SBA. Pt needing increased time for tasks. Pt supine in bed all needs in reach. Pt would continue to benefit from OT services.

## 2023-05-26 ENCOUNTER — ANESTHESIA EVENT (OUTPATIENT)
Dept: PERIOP | Facility: HOSPITAL | Age: 63
DRG: 872 | End: 2023-05-26
Payer: COMMERCIAL

## 2023-05-26 LAB
BASOPHILS # BLD AUTO: 0.08 10*3/MM3 (ref 0–0.2)
BASOPHILS NFR BLD AUTO: 0.6 % (ref 0–1.5)
DEPRECATED RDW RBC AUTO: 43.1 FL (ref 37–54)
EOSINOPHIL # BLD AUTO: 0.28 10*3/MM3 (ref 0–0.4)
EOSINOPHIL NFR BLD AUTO: 2.2 % (ref 0.3–6.2)
ERYTHROCYTE [DISTWIDTH] IN BLOOD BY AUTOMATED COUNT: 13.4 % (ref 12.3–15.4)
GLUCOSE BLDC GLUCOMTR-MCNC: 225 MG/DL (ref 70–130)
GLUCOSE BLDC GLUCOMTR-MCNC: 249 MG/DL (ref 70–130)
GLUCOSE BLDC GLUCOMTR-MCNC: 269 MG/DL (ref 70–130)
GLUCOSE BLDC GLUCOMTR-MCNC: 330 MG/DL (ref 70–130)
HCT VFR BLD AUTO: 35.4 % (ref 34–46.6)
HGB BLD-MCNC: 11.4 G/DL (ref 12–15.9)
HOLD SPECIMEN: NORMAL
IMM GRANULOCYTES # BLD AUTO: 0.33 10*3/MM3 (ref 0–0.05)
IMM GRANULOCYTES NFR BLD AUTO: 2.6 % (ref 0–0.5)
LYMPHOCYTES # BLD AUTO: 2 10*3/MM3 (ref 0.7–3.1)
LYMPHOCYTES NFR BLD AUTO: 15.6 % (ref 19.6–45.3)
MCH RBC QN AUTO: 28.1 PG (ref 26.6–33)
MCHC RBC AUTO-ENTMCNC: 32.2 G/DL (ref 31.5–35.7)
MCV RBC AUTO: 87.4 FL (ref 79–97)
MONOCYTES # BLD AUTO: 0.71 10*3/MM3 (ref 0.1–0.9)
MONOCYTES NFR BLD AUTO: 5.5 % (ref 5–12)
NEUTROPHILS NFR BLD AUTO: 73.5 % (ref 42.7–76)
NEUTROPHILS NFR BLD AUTO: 9.46 10*3/MM3 (ref 1.7–7)
NRBC BLD AUTO-RTO: 0 /100 WBC (ref 0–0.2)
PLATELET # BLD AUTO: 568 10*3/MM3 (ref 140–450)
PMV BLD AUTO: 9.2 FL (ref 6–12)
QT INTERVAL: 362 MS
QT INTERVAL: 376 MS
QTC INTERVAL: 436 MS
QTC INTERVAL: 445 MS
RBC # BLD AUTO: 4.05 10*6/MM3 (ref 3.77–5.28)
WBC NRBC COR # BLD: 12.86 10*3/MM3 (ref 3.4–10.8)

## 2023-05-26 PROCEDURE — 97530 THERAPEUTIC ACTIVITIES: CPT

## 2023-05-26 PROCEDURE — 85025 COMPLETE CBC W/AUTO DIFF WBC: CPT | Performed by: HOSPITALIST

## 2023-05-26 PROCEDURE — 63710000001 INSULIN DETEMIR PER 5 UNITS: Performed by: HOSPITALIST

## 2023-05-26 PROCEDURE — 63710000001 INSULIN ASPART PER 5 UNITS: Performed by: HOSPITALIST

## 2023-05-26 PROCEDURE — 25010000002 HEPARIN (PORCINE) PER 1000 UNITS: Performed by: HOSPITALIST

## 2023-05-26 PROCEDURE — 82948 REAGENT STRIP/BLOOD GLUCOSE: CPT

## 2023-05-26 PROCEDURE — 97535 SELF CARE MNGMENT TRAINING: CPT

## 2023-05-26 PROCEDURE — 97110 THERAPEUTIC EXERCISES: CPT

## 2023-05-26 PROCEDURE — 63710000001 INSULIN DETEMIR PER 5 UNITS: Performed by: INTERNAL MEDICINE

## 2023-05-26 RX ORDER — AMLODIPINE BESYLATE 5 MG/1
TABLET ORAL
Qty: 30 TABLET | Refills: 0 | Status: SHIPPED | OUTPATIENT
Start: 2023-05-26

## 2023-05-26 RX ADMIN — Medication 10 ML: at 20:59

## 2023-05-26 RX ADMIN — HEPARIN SODIUM 5000 UNITS: 5000 INJECTION INTRAVENOUS; SUBCUTANEOUS at 05:54

## 2023-05-26 RX ADMIN — INSULIN ASPART 5 UNITS: 100 INJECTION, SOLUTION INTRAVENOUS; SUBCUTANEOUS at 17:18

## 2023-05-26 RX ADMIN — SUCRALFATE 1 G: 1 TABLET ORAL at 17:17

## 2023-05-26 RX ADMIN — INSULIN ASPART 5 UNITS: 100 INJECTION, SOLUTION INTRAVENOUS; SUBCUTANEOUS at 08:40

## 2023-05-26 RX ADMIN — VENLAFAXINE HYDROCHLORIDE 75 MG: 75 CAPSULE, EXTENDED RELEASE ORAL at 08:39

## 2023-05-26 RX ADMIN — TAMSULOSIN HYDROCHLORIDE 0.4 MG: 0.4 CAPSULE ORAL at 08:39

## 2023-05-26 RX ADMIN — HEPARIN SODIUM 5000 UNITS: 5000 INJECTION INTRAVENOUS; SUBCUTANEOUS at 20:58

## 2023-05-26 RX ADMIN — Medication 10 ML: at 12:03

## 2023-05-26 RX ADMIN — INSULIN ASPART 8 UNITS: 100 INJECTION, SOLUTION INTRAVENOUS; SUBCUTANEOUS at 12:01

## 2023-05-26 RX ADMIN — INSULIN DETEMIR 45 UNITS: 100 INJECTION, SOLUTION SUBCUTANEOUS at 20:58

## 2023-05-26 RX ADMIN — HYDROCODONE BITARTRATE AND ACETAMINOPHEN 1 TABLET: 7.5; 325 TABLET ORAL at 11:11

## 2023-05-26 RX ADMIN — SUCRALFATE 1 G: 1 TABLET ORAL at 08:40

## 2023-05-26 RX ADMIN — CLOPIDOGREL BISULFATE 75 MG: 75 TABLET ORAL at 08:39

## 2023-05-26 RX ADMIN — ARIPIPRAZOLE 5 MG: 5 TABLET ORAL at 08:39

## 2023-05-26 RX ADMIN — AMLODIPINE BESYLATE 5 MG: 5 TABLET ORAL at 08:40

## 2023-05-26 RX ADMIN — ATORVASTATIN CALCIUM 80 MG: 40 TABLET, FILM COATED ORAL at 08:39

## 2023-05-26 RX ADMIN — RANOLAZINE 500 MG: 500 TABLET, FILM COATED, EXTENDED RELEASE ORAL at 08:39

## 2023-05-26 RX ADMIN — RANOLAZINE 500 MG: 500 TABLET, FILM COATED, EXTENDED RELEASE ORAL at 20:58

## 2023-05-26 RX ADMIN — SUCRALFATE 1 G: 1 TABLET ORAL at 20:58

## 2023-05-26 RX ADMIN — GABAPENTIN 200 MG: 100 CAPSULE ORAL at 20:58

## 2023-05-26 RX ADMIN — HYDROCODONE BITARTRATE AND ACETAMINOPHEN 1 TABLET: 7.5; 325 TABLET ORAL at 19:52

## 2023-05-26 RX ADMIN — INSULIN DETEMIR 20 UNITS: 100 INJECTION, SOLUTION SUBCUTANEOUS at 08:40

## 2023-05-26 RX ADMIN — ISOSORBIDE MONONITRATE 30 MG: 30 TABLET, EXTENDED RELEASE ORAL at 08:39

## 2023-05-26 RX ADMIN — FOLIC ACID 1000 MCG: 1 TABLET ORAL at 08:40

## 2023-05-26 RX ADMIN — GABAPENTIN 200 MG: 100 CAPSULE ORAL at 08:39

## 2023-05-26 RX ADMIN — HEPARIN SODIUM 5000 UNITS: 5000 INJECTION INTRAVENOUS; SUBCUTANEOUS at 13:31

## 2023-05-26 RX ADMIN — LEVOTHYROXINE SODIUM 50 MCG: 50 TABLET ORAL at 05:54

## 2023-05-26 RX ADMIN — FUROSEMIDE 40 MG: 40 TABLET ORAL at 08:39

## 2023-05-26 RX ADMIN — DOCUSATE SODIUM 50 MG AND SENNOSIDES 8.6 MG 2 TABLET: 8.6; 5 TABLET, FILM COATED ORAL at 08:39

## 2023-05-26 RX ADMIN — HYDROCHLOROTHIAZIDE 12.5 MG: 12.5 TABLET ORAL at 08:39

## 2023-05-26 RX ADMIN — ASPIRIN 325 MG: 325 TABLET, COATED ORAL at 08:40

## 2023-05-26 RX ADMIN — Medication 10 ML: at 12:02

## 2023-05-26 RX ADMIN — SUCRALFATE 1 G: 1 TABLET ORAL at 12:01

## 2023-05-26 RX ADMIN — PANTOPRAZOLE SODIUM 40 MG: 40 TABLET, DELAYED RELEASE ORAL at 08:40

## 2023-05-26 NOTE — THERAPY TREATMENT NOTE
Patient Name: Ariana Martinez  : 1960    MRN: [id number]                              Today's Date: 2023       Admit Date: 2023    Visit Dx:     ICD-10-CM ICD-9-CM   1. Sepsis without acute organ dysfunction, due to unspecified organism  A41.9 038.9     995.91   2. Cellulitis of right lower extremity  L03.115 682.6   3. Type 2 diabetes mellitus with hyperglycemia, unspecified whether long term insulin use  E11.65 250.00   4. Impaired mobility and activities of daily living  Z74.09 V49.89    Z78.9    5. Impaired physical mobility  Z74.09 781.99   6. Impaired mobility and ADLs  Z74.09 V49.89    Z78.9    7. Urinary retention  R33.9 788.20     Patient Active Problem List   Diagnosis   • 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, with long-term current use of insulin   • Closed nondisplaced fracture of fifth left metatarsal bone   • MAURICIO (generalized anxiety disorder)   • Depression, major, recurrent, moderate (HCC)   • GERD without esophagitis   • Long term prescription opiate use   • Mixed hyperlipidemia   • Vitamin D deficiency   • Seasonal allergic rhinitis   • Restrictive lung disease secondary to obesity   • Adult body mass index 37.0-37.9   • Snoring   • Class 3 severe obesity due to excess calories without serious comorbidity with body mass index (BMI) of 40.0 to 44.9 in adult (HCC)   • (HFpEF) heart failure with preserved ejection fraction   • Type 2 diabetes mellitus with hyperglycemia, with long-term current use of insulin ([facility])   • Cyanocobalamin deficiency   • Coronary artery disease of native artery of native heart with stable angina pectoris   • Hypertension   • Meniere's disease   • Gastroparesis   • Pulmonary hypertension ([facility])   • Pes cavus   • Primary osteoarthritis involving multiple joints   • Generalized anxiety disorder   • Chronic right-sided low back pain with right-sided sciatica   • Chest pain   • Adverse effect of iron   • Chest pain due to myocardial  ischemia   • Nonrheumatic tricuspid valve regurgitation   • Iron deficiency anemia due to chronic blood loss   • Malaise and fatigue   • Ankle arthritis   • Urinary retention   • Endocarditis   • Essential hypertension   • Occlusion and stenosis of bilateral carotid arteries   • S/P BKA (below knee amputation) unilateral, left (HCC)   • Phantom pain after amputation of lower extremity   • S/P CABG (coronary artery bypass graft)   • Severe malnutrition   • TIA (transient ischemic attack)   • Coronary artery abnormality   • Elevated d-dimer   • Neuropathy   • Chest pain, unspecified type   • Acute pain of right shoulder   • Rotator cuff syndrome, right   • Acquired hypothyroidism   • Bilateral leg edema   • Left elbow pain   • Gastritis   • Olecranon bursitis, left elbow   • Sepsis without acute organ dysfunction, due to unspecified organism     Past Medical History:   Diagnosis Date   • Acute bacterial endocarditis 3/13/2021   • Angina, class IV    • Anxiety    • Anxiety and depression    • Arthritis    • Benign paroxysmal positional vertigo    • Bladder disorder     has bladder stimulator   • Carpal tunnel syndrome    • CHF (congestive heart failure)    • Chronic pain    • Coronary atherosclerosis     hx CABG 2005.  is followed by Dr Kwon   • Depression    • Diabetes mellitus     Type 2, controlled   • Diabetic polyneuropathy    • Disease of thyroid gland    • Elevated cholesterol    • Female stress incontinence    • Foot pain, left    • Full dentures    • Gastroparesis    • GERD (gastroesophageal reflux disease)    • Hyperlipidemia    • Hypertension    • Low back pain    • Malaise and fatigue    • Multiple joint pain    • Obesity     Refuses to be weighed   • Occlusion and stenosis of bilateral carotid arteries 6/18/2021   • Otalgia     Both   • Perforation of tympanic membrane     Left   • Postoperative wound infection    • Vitamin D deficiency    • Wears glasses     reading     Past Surgical History:    Procedure Laterality Date   • ABDOMINAL SURGERY     • AMPUTATION FOOT     • ANGIOPLASTY      coronary   • ANKLE ARTHROSCOPY Left 02/26/2021    Procedure: Left foot hardware removal, ankle arthroscopy, bone grafting , left foot exostectomy;  Surgeon: Ignacio Lord DPM;  Location: [facility] OR;  Service: Podiatry;  Laterality: Left;   • BREAST BIOPSY Right    • CARDIAC CATHETERIZATION     • CARDIAC CATHETERIZATION N/A 06/20/2017    Procedure: Right Heart Cath;  Surgeon: Can Kwon MD PhD;  Location: [facility] CATH INVASIVE LOCATION;  Service:    • CARDIAC CATHETERIZATION N/A 02/18/2020    Procedure: Left Heart Cath;  Surgeon: Catalina Cooper MD;  Location: [facility] CATH INVASIVE LOCATION;  Service: Cardiology;  Laterality: N/A;   • CARDIAC CATHETERIZATION N/A 04/06/2022    Procedure: Left Heart Cath;  Surgeon: Sheryl Navas MD;  Location: [facility] CATH INVASIVE LOCATION;  Service: Cardiology;  Laterality: N/A;   • CARPAL TUNNEL RELEASE     • CHOLECYSTECTOMY     • COLONOSCOPY N/A 06/24/2020    Procedure: COLONOSCOPY;  Surgeon: Julián Maldonado MD;  Location: [facility] ENDOSCOPY;  Service: Gastroenterology;  Laterality: N/A;   • CORONARY ARTERY BYPASS GRAFT  2005   • ENDOSCOPY N/A 10/19/2018    Procedure: ESOPHAGOGASTRODUODENOSCOPY possible dilation;  Surgeon: Julián Maldonado MD;  Location: [facility] ENDOSCOPY;  Service: Gastroenterology   • ENDOSCOPY N/A 06/24/2020    Procedure: ESOPHAGOGASTRODUODENOSCOPY WED appt please;  Surgeon: Julián Maldonado MD;  Location: [facility] ENDOSCOPY;  Service: Gastroenterology;  Laterality: N/A;   • ENDOSCOPY N/A 06/10/2022    Procedure: ESOPHAGOGASTRODUODENOSCOPY   room 380;  Surgeon: Jeremiah Wilkins MD;  Location: [facility] ENDOSCOPY;  Service: Gastroenterology;  Laterality: N/A;   • ENDOSCOPY N/A 1/10/2023    Procedure: ESOPHAGOGASTRODUODENOSCOPY;  Surgeon: Jeremiah Wilkins MD;  Location: [facility] ENDOSCOPY;  Service: Gastroenterology;  Laterality: N/A;   • ENDOSCOPY AND  COLONOSCOPY     • FOOT SURGERY      Toes   • FOOT SURGERY     • GASTRIC BANDING      Revision, laparoscopic   • HYSTERECTOMY     • INCISION AND DRAINAGE LEG Left 03/12/2021    Procedure: Left ankle arthroscopic irrigation and debridement, screw removal;  Surgeon: Ignacio Lord DPM;  Location: [facility];  Service: Podiatry;  Laterality: Left;   • MOUTH SURGERY     • SALPINGO OOPHORECTOMY     • SHOULDER SURGERY     • SUBTALAR ARTHRODESIS Left 01/16/2019    Procedure: LEFT FOOT HARDWARE REMOVAL, FIFTH METATARSAL , OPEN REDUCTION INTERNAL FIXATION, CALCANEAL OSTEOTOMY;  Surgeon: Ignacio Lord DPM;  Location: [facility];  Service: Podiatry   • SUBTALAR ARTHRODESIS Left 10/16/2019    Procedure: foot hardware removal, subtalar joint fusion  possible de/reattachment of achilles tendon        (c-arm);  Surgeon: Ignacio Lord DPM;  Location: [facility];  Service: Podiatry   • SUBTALAR ARTHRODESIS Left 09/30/2020    Procedure: subtalar, talonavicular joint arthrodesis.  Removal hardware.          (c-arm);  Surgeon: Ignacio Lord DPM;  Location: [facility];  Service: Podiatry;  Laterality: Left;   • TRANSESOPHAGEAL ECHOCARDIOGRAM (LAMONTE)      With color flow      General Information     Row Name 05/26/23 1506          OT Time and Intention    Document Type therapy note (daily note)  -LW     Mode of Treatment occupational therapy;individual therapy  -     Row Name 05/26/23 1506          General Information    Patient Profile Reviewed yes  -LW     Existing Precautions/Restrictions fall  -     Row Name 05/26/23 1506          Cognition    Orientation Status (Cognition) oriented x 4  -     Row Name 05/26/23 1506          Safety Issues, Functional Mobility    Impairments Affecting Function (Mobility) endurance/activity tolerance;pain;strength  -           User Key  (r) = Recorded By, (t) = Taken By, (c) = Cosigned By    Initials Name Provider Type    LW Sarah Irby COTA Occupational Therapist  Assistant                 Mobility/ADL's     Row Name 05/26/23 1508          Bed Mobility    Bed Mobility rolling left;rolling right;supine-sit  -LW     Rolling Left [place] (Bed Mobility) standby assist  -LW     Rolling Right [place] (Bed Mobility) standby assist  -LW     Supine-Sit [place] (Bed Mobility) standby assist  -LW     Assistive Device (Bed Mobility) head of bed elevated;bed rails  -LW     Comment, (Bed Mobility) Sitting EOB with SBA.  -LW     Row Name 05/26/23 1508          Transfers    Transfers bed-chair transfer;sit-stand transfer;stand-sit transfer  -LW     Row Name 05/26/23 1508          Bed-Chair Transfer    Bed-Chair [place] (Transfers) contact guard  -LW     Assistive Device (Bed-Chair Transfers) walker, front-wheeled  -LW     Row Name 05/26/23 1508          Sit-Stand Transfer    Sit-Stand [place] (Transfers) contact guard  -LW     Assistive Device (Sit-Stand Transfers) walker, front-wheeled  -LW     Row Name 05/26/23 1508          Stand-Sit Transfer    Stand-Sit [place] (Transfers) contact guard  -LW     Assistive Device (Stand-Sit Transfers) walker, front-wheeled  -LW     Row Name 05/26/23 1508          Activities of Daily Living    BADL Assessment/Intervention bathing;upper body dressing;lower body dressing;grooming;feeding  -LW     Row Name 05/26/23 1508          Grooming Assessment/Training    [place] Level (Grooming) grooming skills;hair care, combing/brushing;oral care regimen;wash face, hands;standby assist  -LW     Position (Grooming) edge of bed sitting  -LW     Row Name 05/26/23 1508          Self-Feeding Assessment/Training    [place] Level (Feeding) feeding skills;liquids to mouth;knife use;prepare tray/open items;scoop food and bring to mouth;independent  -LW     Position (Self-Feeding) supported sitting  -LW     Row Name 05/26/23 1508          Lower Body Dressing Assessment/Training    [place] Level (Lower Body Dressing) lower body  dressing skills;doff;don;socks;dependent (less than 25% patient effort)  -LW     Position (Lower Body Dressing) edge of bed sitting  -LW     Row Name 05/26/23 1508          Bathing Assessment/Intervention    [place] Level (Bathing) bathing skills;lower body;upper body;standby assist  -LW     Position (Bathing) edge of bed sitting  -LW     Row Name 05/26/23 1508          Upper Body Dressing Assessment/Training    [place] Level (Upper Body Dressing) upper body dressing skills;doff;don;standby assist  Hospital gown  -LW     Position (Upper Body Dressing) edge of bed sitting  -LW           User Key  (r) = Recorded By, (t) = Taken By, (c) = Cosigned By    Initials Name Provider Type    Sarah Davies COTA Occupational Therapist Assistant               Obj/Interventions    No documentation.                Goals/Plan     [facility] Name 05/26/23 1040          Transfer Goal 1 (OT)    Activity/Assistive Device (Transfer Goal 1, OT) sit-to-stand/stand-to-sit;toilet;wheelchair transfer;bed-to-chair/chair-to-bed  -LW     [place] Level/Cues Needed (Transfer Goal 1, OT) contact guard required  -LW     Time Frame (Transfer Goal 1, OT) long term goal (LTG)  -     Row Name 05/26/23 1040          Bathing Goal 1 (OT)    Activity/Device (Bathing Goal 1, OT) bathing skills, all  -LW     [place] Level/Cues Needed (Bathing Goal 1, OT) contact guard required  -LW     Time Frame (Bathing Goal 1, OT) long term goal (LTG)  -LW     Progress/Outcomes (Bathing Goal 1, OT) goal met   -[facility] Name 05/26/23 1040          Dressing Goal 1 (OT)    Activity/Device (Dressing Goal 1, OT) dressing skills, all  -LW     [place]/Cues Needed (Dressing Goal 1, OT) contact guard required  -LW     Time Frame (Dressing Goal 1, OT) long term goal (LTG)  -LW     Progress/Outcome (Dressing Goal 1, OT) goal met   -[facility] Name 05/26/23 1040          Toileting Goal 1 (OT)    Activity/Device (Toileting Goal 1, OT) toileting skills, all   -LW     [place] Level/Cues Needed (Toileting Goal 1, OT) minimum assist (75% or more patient effort)  -LW     Time Frame (Toileting Goal 1, OT) long term goal (LTG)  -LW     Progress/Outcome (Toileting Goal 1, OT) goal not met  -LW     Row Name 05/26/23 1040          Self-Feeding Goal 1 (OT)    Activity/Device (Self-Feeding Goal 1, OT) self-feeding skills, all  -LW     [place] Level/Cues Needed (Self-Feeding Goal 1, OT) modified independence  -LW     Time Frame (Self-Feeding Goal 1, OT) long term goal (LTG)  -LW     Progress/Outcomes (Self-Feeding Goal 1, OT) goal met   -LW           User Key  (r) = Recorded By, (t) = Taken By, (c) = Cosigned By    Initials Name Provider Type    Sarah Davies COTA Occupational Therapist Assistant               Clinical Impression     Row Name 05/26/23 1513          Pain Assessment    Pretreatment Pain Rating 6/10  -LW     Posttreatment Pain Rating 6/10  -LW     Pain Location - Side/Orientation Left  -LW     Pain Location - abdomen  -LW     Row Name 05/26/23 1513          Plan of Care Review    Plan of Care Reviewed With patient  -LW     Progress improving  -LW     Outcome Evaluation Pt supine in bed. Rolling L and R SBA. Supine to sit CGA. Sit to stand<>stand to sit CGA. Grooming SBA. UB and LB bathing and dressing SBA. T/f to bschair CGA. Feeding Independent. Pt sitting in bschair all needs in reach. Spouse present. Pt could benefit from continued OT services.  -LW     Row Name 05/26/23 1513          Positioning and Restraints    Pre-Treatment Position in bed  -LW     Post Treatment Position chair  -LW     In Chair reclined;call light within reach;encouraged to call for assist;exit alarm on;with family/caregiver;notified nsg  -LW           User Key  (r) = Recorded By, (t) = Taken By, (c) = Cosigned By    Initials Name Provider Type    Sarah Davies COTA Occupational Therapist Assistant               Outcome Measures     Row Name 05/26/23 1516          How  much help from another is currently needed...    Putting on and taking off regular lower body clothing? 3  -LW     Bathing (including washing, rinsing, and drying) 3  -LW     Toileting (which includes using toilet bed pan or urinal) 3  -LW     Putting on and taking off regular upper body clothing 3  -LW     Taking care of personal grooming (such as brushing teeth) 4  -LW     Eating meals 4  -LW     AM-PAC 6 Clicks Score (OT) 20  -LW     Row Name 05/26/23 1327 05/26/23 0820       How much help from another person do you currently need...    Turning from your back to your side while in flat bed without using bedrails? 3  -AME 3  -JH    Moving from lying on back to sitting on the side of a flat bed without bedrails? 3  -AME 3  -JH    Moving to and from a bed to a chair (including a wheelchair)? 2  -AME 2  -JH    Standing up from a chair using your arms (e.g., wheelchair, bedside chair)? 2  -AME 3  -JH    Climbing 3-5 steps with a railing? 1  -AEM 1  -JH    To walk in hospital room? 1  -AME 1  -JH    AM-PAC 6 Clicks Score (PT) 12  -AME 13  -JH    Highest level of mobility 4 --> Transferred to chair/commode  - 4 --> Transferred to chair/commode  -    Row Name 05/26/23 1327          Functional Assessment    Outcome Measure Options AM-PAC 6 Clicks Basic Mobility (PT)  -AME           User Key  (r) = Recorded By, (t) = Taken By, (c) = Cosigned By    Initials Name Provider Type    AME Ousmane Zhang PTA Physical Therapist Assistant    Sarah Davies COTA Occupational Therapist Assistant     Janae Amaya LPN Licensed Nurse                Occupational Therapy Education     Title: PT OT SLP Therapies (In Progress)     Topic: Occupational Therapy (Done)     Point: ADL training (Done)     Description:   Instruct learner(s) on proper safety adaptation and remediation techniques during self care or transfers.   Instruct in proper use of assistive devices.              Learning Progress Summary           Patient Acceptance,  E,TB, VU by LW at 5/26/2023 1516    Acceptance, E,TB, VU by LW at 5/25/2023 1459    Acceptance, E,TB, VU by RW at 5/22/2023 1243    Comment: POC, Role of OT    Acceptance, E,TB, VU by BB at 5/20/2023 1358    Acceptance, E,TB, VU by BB at 5/20/2023 1357                   Point: Home exercise program (Done)     Description:   Instruct learner(s) on appropriate technique for monitoring, assisting and/or progressing therapeutic exercises/activities.              Learning Progress Summary           Patient Acceptance, E,TB, VU by LW at 5/26/2023 1516    Acceptance, E,TB, VU by LW at 5/25/2023 1459    Acceptance, E,TB, VU by BB at 5/20/2023 1356                   Point: Precautions (Done)     Description:   Instruct learner(s) on prescribed precautions during self-care and functional transfers.              Learning Progress Summary           Patient Acceptance, E,TB, VU by LW at 5/26/2023 1516    Acceptance, E,TB, VU by LW at 5/25/2023 1459    Acceptance, E,TB, VU by RW at 5/22/2023 1243    Comment: POC, Role of OT    Acceptance, E, DU by RB at 5/18/2023 1352    Comment: Pt edu on use of gait belt and non skid socks when OOB and no OOB without assist.                   Point: Body mechanics (Done)     Description:   Instruct learner(s) on proper positioning and spine alignment during self-care, functional mobility activities and/or exercises.              Learning Progress Summary           Patient Acceptance, E,TB, VU by LW at 5/26/2023 1516    Acceptance, E,TB, VU by LW at 5/25/2023 1459    Acceptance, E,TB, VU by RW at 5/22/2023 1243    Comment: POC, Role of OT    Acceptance, E,TB, VU by BB at 5/20/2023 1358    Acceptance, E,TB, VU by BB at 5/20/2023 1357                               User Key     Initials Effective Dates Name Provider Type Discipline     06/16/21 -  Fredi France OT Occupational Therapist OT    BB 06/16/21 -  Matilde Rios COTA Occupational Therapist Assistant OT    LW 06/16/21 -  Mahamed  LINDA Morillo Occupational Therapist Assistant OT     09/22/22 -  Missy Esteves OT Occupational Therapist OT              OT Recommendation and Plan     Plan of Care Review  Plan of Care Reviewed With: patient  Progress: improving  Outcome Evaluation: Pt supine in bed. Rolling L and R SBA. Supine to sit CGA. Sit to stand<>stand to sit CGA. Grooming SBA. UB and LB bathing and dressing SBA. T/f to bschair CGA. Feeding Independent. Pt sitting in bschair all needs in reach. Spouse present. Pt could benefit from continued OT services.     Time Calculation:    Time Calculation- OT     Row Name 05/26/23 1517             Time Calculation- OT    OT Start Time 1040  -LW      OT Stop Time 1140  -LW      OT Time Calculation (min) 60 min  -LW      Total Timed Code Minutes- OT 60 minute(s)  -LW      OT Received On 05/26/23  -LW         Timed Charges    46879 - OT Self Care/Mgmt Minutes 60  -LW         Total Minutes    Timed Charges Total Minutes 60  -LW       Total Minutes 60  -LW            User Key  (r) = Recorded By, (t) = Taken By, (c) = Cosigned By    Initials Name Provider Type    LW Sarah Irby COTA Occupational Therapist Assistant              Therapy Charges for Today     Code Description Service Date Service Provider Modifiers Qty    06090284376 HC OT SELF CARE/MGMT/TRAIN EA 15 MIN 5/25/2023 Sarah Irby COTA GO 5    04086564698 HC OT SELF CARE/MGMT/TRAIN EA 15 MIN 5/26/2023 Sarah Irby COTA GO 4               LINDA Booth  5/26/2023

## 2023-05-26 NOTE — PROGRESS NOTES
LOS: 9 days     Patient Care Team:  Dolly Foss APRN as PCP - General (Family Medicine)  Uziel Alonso MD as Consulting Physician (Hematology and Oncology)  Elvis Gamboa APRN as Nurse Practitioner (Endocrinology)  Teressa Hammond APRN as Nurse Practitioner (Oncology)      Subjective     Urinary retention    Objective       Vital Signs  Temp:  [96 °F (35.6 °C)-98 °F (36.7 °C)] 97.1 °F (36.2 °C)  Heart Rate:  [69-85] 73  Resp:  [18] 18  BP: (105-148)/(51-68) 148/66    Physical Exam:        General Appearance:   No acute distress     Respiratory:    UNLABORED RESPIRATIONS.     Abdomen:     SOFT.       Genitourinary:  Jose in place     Rectal:     DEFERRED       Results Review:       Imaging Results (Last 24 Hours)     ** No results found for the last 24 hours. **        Lab Results (last 24 hours)     Procedure Component Value Units Date/Time    POC Glucose Once [[id number]]  (Abnormal) Collected: 05/26/23 0710    Specimen: Blood Updated: 05/26/23 0753     Glucose 225 mg/dL      Comment: RN NotifiedOperator: [id number] DACOSTA ALISEMeter ID: [id number]       Extra Tubes [[id number]] Collected: 05/26/23 0543    Specimen: Blood, Venous Line Updated: 05/26/23 0645    Narrative:      The following orders were created for panel order Extra Tubes.  Procedure                               Abnormality         Status                     ---------                               -----------         ------                     Green Top (Gel)[[id number]]                                  Final result                 Please view results for these tests on the individual orders.    Green Top (Gel) [[id number]] Collected: 05/26/23 0543    Specimen: Blood Updated: 05/26/23 0645     Extra Tube Hold for add-ons.     Comment: Auto resulted.       CBC Auto Differential [[id number]]  (Abnormal) Collected: 05/26/23 0543    Specimen: Blood Updated: 05/26/23 0619     WBC 12.86 10*3/mm3      RBC 4.05 10*6/mm3      Hemoglobin 11.4  g/dL      Hematocrit 35.4 %      MCV 87.4 fL      MCH 28.1 pg      MCHC 32.2 g/dL      RDW 13.4 %      RDW-SD 43.1 fl      MPV 9.2 fL      Platelets 568 10*3/mm3      Neutrophil % 73.5 %      Lymphocyte % 15.6 %      Monocyte % 5.5 %      Eosinophil % 2.2 %      Basophil % 0.6 %      Immature Grans % 2.6 %      Neutrophils, Absolute 9.46 10*3/mm3      Lymphocytes, Absolute 2.00 10*3/mm3      Monocytes, Absolute 0.71 10*3/mm3      Eosinophils, Absolute 0.28 10*3/mm3      Basophils, Absolute 0.08 10*3/mm3      Immature Grans, Absolute 0.33 10*3/mm3      nRBC 0.0 /100 WBC     POC Glucose Once [[id number]]  (Abnormal) Collected: 05/25/23 1927    Specimen: Blood Updated: 05/25/23 1958     Glucose 204 mg/dL      Comment: RN NotifiedOperator: [id number] ESPINOZA BALWINDERAMeter ID: [id number]       POC Glucose Once [[id number]]  (Abnormal) Collected: 05/25/23 1701    Specimen: Blood Updated: 05/25/23 1718     Glucose 313 mg/dL      Comment: RN NotifiedOperator: [id number] BELLA KESSLERANNAMeter ID: [id number]       POC Glucose Once [[id number]]  (Abnormal) Collected: 05/25/23 1111    Specimen: Blood Updated: 05/25/23 1130     Glucose 307 mg/dL      Comment: RN NotifiedOperator: [id number] BELLA KESSLERANNAMeter ID: [id number]               I reviewed the patient's new clinical results.  I reviewed the patient's new imaging results and agree with the interpretation.  I reviewed the patient's other test results and agree with the interpretation        Assessment:    Urinary retention    Plan:     Cystoscopy in ana Doe MD  05/26/23  09:37 CDT

## 2023-05-26 NOTE — THERAPY TREATMENT NOTE
Acute Care - Physical Therapy Treatment Note  [facility]     Patient Name: Ariana Martinez  : 1960  MRN: [id number]  Today's Date: 2023      Visit Dx:     ICD-10-CM ICD-9-CM   1. Sepsis without acute organ dysfunction, due to unspecified organism  A41.9 038.9     995.91   2. Cellulitis of right lower extremity  L03.115 682.6   3. Type 2 diabetes mellitus with hyperglycemia, unspecified whether long term insulin use  E11.65 250.00   4. Impaired mobility and activities of daily living  Z74.09 V49.89    Z78.9    5. Impaired physical mobility  Z74.09 781.99   6. Impaired mobility and ADLs  Z74.09 V49.89    Z78.9    7. Urinary retention  R33.9 788.20     Patient Active Problem List   Diagnosis   • 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, with long-term current use of insulin   • Closed nondisplaced fracture of fifth left metatarsal bone   • MAURICIO (generalized anxiety disorder)   • Depression, major, recurrent, moderate ([facility])   • GERD without esophagitis   • Long term prescription opiate use   • Mixed hyperlipidemia   • Vitamin D deficiency   • Seasonal allergic rhinitis   • Restrictive lung disease secondary to obesity   • Adult body mass index 37.0-37.9   • Snoring   • Class 3 severe obesity due to excess calories without serious comorbidity with body mass index (BMI) of 40.0 to 44.9 in adult ([facility])   • (HFpEF) heart failure with preserved ejection fraction   • Type 2 diabetes mellitus with hyperglycemia, with long-term current use of insulin ([facility])   • Cyanocobalamin deficiency   • Coronary artery disease of native artery of native heart with stable angina pectoris   • Hypertension   • Meniere's disease   • Gastroparesis   • Pulmonary hypertension ([facility])   • Pes cavus   • Primary osteoarthritis involving multiple joints   • Generalized anxiety disorder   • Chronic right-sided low back pain with right-sided sciatica   • Chest pain   • Adverse effect of iron   • Chest pain due to  myocardial ischemia   • Nonrheumatic tricuspid valve regurgitation   • Iron deficiency anemia due to chronic blood loss   • Malaise and fatigue   • Ankle arthritis   • Urinary retention   • Endocarditis   • Essential hypertension   • Occlusion and stenosis of bilateral carotid arteries   • S/P BKA (below knee amputation) unilateral, left (HCC)   • Phantom pain after amputation of lower extremity   • S/P CABG (coronary artery bypass graft)   • Severe malnutrition   • TIA (transient ischemic attack)   • Coronary artery abnormality   • Elevated d-dimer   • Neuropathy   • Chest pain, unspecified type   • Acute pain of right shoulder   • Rotator cuff syndrome, right   • Acquired hypothyroidism   • Bilateral leg edema   • Left elbow pain   • Gastritis   • Olecranon bursitis, left elbow   • Sepsis without acute organ dysfunction, due to unspecified organism     Past Medical History:   Diagnosis Date   • Acute bacterial endocarditis 3/13/2021   • Angina, class IV    • Anxiety    • Anxiety and depression    • Arthritis    • Benign paroxysmal positional vertigo    • Bladder disorder     has bladder stimulator   • Carpal tunnel syndrome    • CHF (congestive heart failure)    • Chronic pain    • Coronary atherosclerosis     hx CABG 2005.  is followed by Dr Kwon   • Depression    • Diabetes mellitus     Type 2, controlled   • Diabetic polyneuropathy    • Disease of thyroid gland    • Elevated cholesterol    • Female stress incontinence    • Foot pain, left    • Full dentures    • Gastroparesis    • GERD (gastroesophageal reflux disease)    • Hyperlipidemia    • Hypertension    • Low back pain    • Malaise and fatigue    • Multiple joint pain    • Obesity     Refuses to be weighed   • Occlusion and stenosis of bilateral carotid arteries 6/18/2021   • Otalgia     Both   • Perforation of tympanic membrane     Left   • Postoperative wound infection    • Vitamin D deficiency    • Wears glasses     reading     Past Surgical  History:   Procedure Laterality Date   • ABDOMINAL SURGERY     • AMPUTATION FOOT     • ANGIOPLASTY      coronary   • ANKLE ARTHROSCOPY Left 02/26/2021    Procedure: Left foot hardware removal, ankle arthroscopy, bone grafting , left foot exostectomy;  Surgeon: Ignacio Lord DPM;  Location: [facility] OR;  Service: Podiatry;  Laterality: Left;   • BREAST BIOPSY Right    • CARDIAC CATHETERIZATION     • CARDIAC CATHETERIZATION N/A 06/20/2017    Procedure: Right Heart Cath;  Surgeon: Can Kwon MD PhD;  Location: [facility] CATH INVASIVE LOCATION;  Service:    • CARDIAC CATHETERIZATION N/A 02/18/2020    Procedure: Left Heart Cath;  Surgeon: Catalina Cooper MD;  Location: [facility] CATH INVASIVE LOCATION;  Service: Cardiology;  Laterality: N/A;   • CARDIAC CATHETERIZATION N/A 04/06/2022    Procedure: Left Heart Cath;  Surgeon: Sheryl Navas MD;  Location: [facility] CATH INVASIVE LOCATION;  Service: Cardiology;  Laterality: N/A;   • CARPAL TUNNEL RELEASE     • CHOLECYSTECTOMY     • COLONOSCOPY N/A 06/24/2020    Procedure: COLONOSCOPY;  Surgeon: Julián Maldonado MD;  Location: [facility] ENDOSCOPY;  Service: Gastroenterology;  Laterality: N/A;   • CORONARY ARTERY BYPASS GRAFT  2005   • ENDOSCOPY N/A 10/19/2018    Procedure: ESOPHAGOGASTRODUODENOSCOPY possible dilation;  Surgeon: Julián Maldonado MD;  Location: [facility] ENDOSCOPY;  Service: Gastroenterology   • ENDOSCOPY N/A 06/24/2020    Procedure: ESOPHAGOGASTRODUODENOSCOPY WED appt please;  Surgeon: Jluián Maldondao MD;  Location: [facility] ENDOSCOPY;  Service: Gastroenterology;  Laterality: N/A;   • ENDOSCOPY N/A 06/10/2022    Procedure: ESOPHAGOGASTRODUODENOSCOPY   room 380;  Surgeon: Jeremiah Wilkins MD;  Location: [facility] ENDOSCOPY;  Service: Gastroenterology;  Laterality: N/A;   • ENDOSCOPY N/A 1/10/2023    Procedure: ESOPHAGOGASTRODUODENOSCOPY;  Surgeon: Jeremiah Wilkins MD;  Location: [facility] ENDOSCOPY;  Service: Gastroenterology;  Laterality: N/A;   •  ENDOSCOPY AND COLONOSCOPY     • FOOT SURGERY      Toes   • FOOT SURGERY     • GASTRIC BANDING      Revision, laparoscopic   • HYSTERECTOMY     • INCISION AND DRAINAGE LEG Left 03/12/2021    Procedure: Left ankle arthroscopic irrigation and debridement, screw removal;  Surgeon: Ignacio Lord DPM;  Location: [facility];  Service: Podiatry;  Laterality: Left;   • MOUTH SURGERY     • SALPINGO OOPHORECTOMY     • SHOULDER SURGERY     • SUBTALAR ARTHRODESIS Left 01/16/2019    Procedure: LEFT FOOT HARDWARE REMOVAL, FIFTH METATARSAL , OPEN REDUCTION INTERNAL FIXATION, CALCANEAL OSTEOTOMY;  Surgeon: Ignacio Lord DPM;  Location: [facility];  Service: Podiatry   • SUBTALAR ARTHRODESIS Left 10/16/2019    Procedure: foot hardware removal, subtalar joint fusion  possible de/reattachment of achilles tendon        (c-arm);  Surgeon: Ignacio Lord DPM;  Location: [facility];  Service: Podiatry   • SUBTALAR ARTHRODESIS Left 09/30/2020    Procedure: subtalar, talonavicular joint arthrodesis.  Removal hardware.          (c-arm);  Surgeon: Ignacio Lord DPM;  Location: [facility];  Service: Podiatry;  Laterality: Left;   • TRANSESOPHAGEAL ECHOCARDIOGRAM (LAMONTE)      With color flow     PT Assessment (last 12 hours)     PT Evaluation and Treatment     Row Name 05/26/23 1327          Physical Therapy Time and Intention    Document Type therapy note (daily note)  -AME     Mode of Treatment physical therapy  -AME     Patient Effort good  -AME     Comment --  -AME     Row Name 05/26/23 1327          General Information    Patient Profile Reviewed yes  -     Existing Precautions/Restrictions fall  -AME     Row Name 05/26/23 1327          Pain    Pretreatment Pain Rating 6/10  -AME     Posttreatment Pain Rating 6/10  -AME     Pain Location - Side/Orientation Left  -     Pain Location lower  -     Pain Location - extremity  -AME     Row Name 05/26/23 1327          Cognition    Affect/Mental Status (Cognition) WFL  -      Orientation Status (Cognition) oriented x 4  -     Personal Safety Interventions gait belt;fall prevention program maintained;nonskid shoes/slippers when out of bed;muscle strengthening facilitated;supervised activity  -     Row Name 05/26/23 1327          Bed Mobility    Bed Mobility sit-supine;scooting/bridging  -     Scooting/Bridging [place] (Bed Mobility) --  -     Supine-Sit [place] (Bed Mobility) standby assist  -     Sit-Supine [place] (Bed Mobility) contact guard  -     Assistive Device (Bed Mobility) head of bed elevated;bed rails  -AME     Row Name 05/26/23 1327          Transfers    Transfers sit-stand transfer;stand-sit transfer;chair-bed transfer  -AME     Row Name 05/26/23 1327          Chair-Bed Transfer    Chair-Bed [place] (Transfers) contact guard  -     Assistive Device (Chair-Bed Transfers) walker, front-wheeled  -AME     Comment, (Chair-Bed Transfer) via stand pivot t/f. pt requested to hold to therapist rather than use RW. pt would benefit from further transfer training with RW.  -AME     Row Name 05/26/23 1327          Sit-Stand Transfer    Sit-Stand [place] (Transfers) contact guard  -     Assistive Device (Sit-Stand Transfers) walker, front-wheeled  -AME     Row Name 05/26/23 1327          Stand-Sit Transfer    Stand-Sit [place] (Transfers) contact guard  -     Assistive Device (Stand-Sit Transfers) walker, front-wheeled  -AME     Row Name 05/26/23 1327          Toilet Transfer    Type (Toilet Transfer) --  -     [place] Level (Toilet Transfer) --  -AME     Assistive Device (Toilet Transfer) --  -AME     Row Name 05/26/23 1327          Gait/Stairs (Locomotion)    Gait/Stairs Locomotion --  -     [place] Level (Gait) --  -AME     Assistive Device (Gait) --  -AME     Row Name 05/26/23 1327          Safety Issues, Functional Mobility    Impairments Affecting Function (Mobility) endurance/activity tolerance;strength;pain  -     Row Name  05/26/23 1327          Motor Skills    Therapeutic Exercise --  ankle DF/PF, QS, GS, hip Ab/Ad, HS, SLR, hip ext in SL- 20x1 arabella.  -AME     Row Name             Wound 05/17/23 1828 Right anterior fifth toe    Wound - Properties Group Placement Date: 05/17/23  - Placement Time: 1828  -JH Side: Right  -JH Orientation: anterior  -JH Location: fifth toe  -JH    Retired Wound - Properties Group Placement Date: 05/17/23  - Placement Time: 1828  -JH Side: Right  -JH Orientation: anterior  -JH Location: fifth toe  -JH    Retired Wound - Properties Group Date first assessed: 05/17/23  - Time first assessed: 1828  -JH Side: Right  -JH Location: fifth toe  -JH    Row Name             Wound 05/23/23 1006 Right distal leg    Wound - Properties Group Placement Date: 05/23/23  - Placement Time: 1006  -LH Present on Hospital Admission: N  -LH Side: Right  -LH Orientation: distal  -LH Location: leg  -LH    Retired Wound - Properties Group Placement Date: 05/23/23  - Placement Time: 1006  -LH Present on Hospital Admission: N  -LH Side: Right  -LH Orientation: distal  -LH Location: leg  -LH    Retired Wound - Properties Group Date first assessed: 05/23/23  - Time first assessed: 1006  -LH Present on Hospital Admission: N  -LH Side: Right  -LH Location: leg  -LH    Row Name 05/26/23 1327          Vital Signs    Pre Systolic BP Rehab 102  -AME     Pre Treatment Diastolic BP 54  -AME     Post Systolic BP Rehab 120  -AME     Post Treatment Diastolic BP 57  -AME     Pretreatment Heart Rate (beats/min) 74  -AME     Posttreatment Heart Rate (beats/min) 75  -AME     Pre SpO2 (%) 96  -AME     O2 Delivery Pre Treatment room air  -AME     Post SpO2 (%) 95  -AME     O2 Delivery Post Treatment room air  -AME     Pre Patient Position Sitting  -AME     Post Patient Position Supine  -AME     Row Name 05/26/23 1327          Positioning and Restraints    Pre-Treatment Position in bed  -AME     Post Treatment Position bed  -AME     In Bed fowlers;call  light within reach;encouraged to call for assist;exit alarm on  all  -AME     Row Name 05/26/23 1327          Therapy Assessment/Plan (PT)    Rehab Potential (PT) good, to achieve stated therapy goals  -     Criteria for Skilled Interventions Met (PT) yes;meets criteria;skilled treatment is necessary  -     Therapy Frequency (PT) other (see comments)  5-7 days/wk  -     Row Name 05/26/23 1327          Bed Mobility Goal 1 (PT)    Activity/Assistive Device (Bed Mobility Goal 1, PT) bed mobility activities, all  -AME     [place] Level/Cues Needed (Bed Mobility Goal 1, PT) standby assist;independent  -AME     Time Frame (Bed Mobility Goal 1, PT) by discharge  -AME     Progress/Outcomes (Bed Mobility Goal 1, PT) goal met  -     Row Name 05/26/23 1327          Transfer Goal 1 (PT)    Activity/Assistive Device (Transfer Goal 1, PT) sit-to-stand/stand-to-sit;bed-to-chair/chair-to-bed  -AME     [place] Level/Cues Needed (Transfer Goal 1, PT) contact guard required;standby assist;modified independence  -AME     Time Frame (Transfer Goal 1, PT) by discharge  -AME     Progress/Outcome (Transfer Goal 1, PT) goal not met  -     Row Name 05/26/23 1327          Gait Training Goal 1 (PT)    Activity/Assistive Device (Gait Training Goal 1, PT) gait (walking locomotion);decrease fall risk  -     [place] Level (Gait Training Goal 1, PT) contact guard required;standby assist;modified independence  -     Distance (Gait Training Goal 1, PT) 50ft or more each trip  -AME     Time Frame (Gait Training Goal 1, PT) 1 week  -AME     Progress/Outcome (Gait Training Goal 1, PT) goal not met  -     Row Name 05/26/23 1327          ROM Goal 1 (PT)    ROM Goal 1 (PT) Pt will tolerate LE exercises OOB in chair with VSS  -AME     Time Frame (ROM Goal 1, PT) by discharge  -AME     Progress/Outcome (ROM Goal 1, PT) goal not met  -           User Key  (r) = Recorded By, (t) = Taken By, (c) = Cosigned By    Initials Name Provider Type     AME Ousmane Zhang PTA Physical Therapist Assistant    Juany Sage, RN Registered Nurse     Janae Amaya LPN Licensed Nurse                Physical Therapy Education     Title: PT OT SLP Therapies (In Progress)     Topic: Physical Therapy (In Progress)     Point: Mobility training (In Progress)     Learning Progress Summary           Patient Acceptance, E, NR by AME at 5/26/2023 1356    Acceptance, E,TB, VU by  at 5/25/2023 1459    Acceptance, E, NR by AME at 5/24/2023 1306    Acceptance, E,TB, VU by NB at 5/18/2023 2138    Acceptance, E, NR by [facility] at 5/18/2023 1407                   Point: Home exercise program (Done)     Learning Progress Summary           Patient Acceptance, E,TB, VU by  at 5/25/2023 1459                   Point: Body mechanics (Done)     Learning Progress Summary           Patient Acceptance, E,TB, VU by  at 5/25/2023 1459    Acceptance, E,TB, VU by  at 5/25/2023 1451                   Point: Precautions (Done)     Learning Progress Summary           Patient Acceptance, E,TB, VU by  at 5/25/2023 1459    Acceptance, E, NR by [facility] at 5/18/2023 1407                               User Key     Initials Effective Dates Name Provider Type Discipline    [facility] 06/16/21 -  Aure Villaseñor PT Physical Therapist PT     06/16/21 -  Ousmane Zhang PTA Physical Therapist Assistant PT     06/16/21 -  Sarah Irby COTA Occupational Therapist Assistant OT    NB 06/16/21 -  Samantha Cuellar RN Registered Nurse Nurse     09/08/22 -  Janae Amaya LPN Licensed Nurse Nurse              PT Recommendation and Plan  Anticipated Discharge Disposition (PT): home with assist, home with home health  Therapy Frequency (PT): other (see comments) (5-7 days/wk)  Plan of Care Reviewed With: patient  Progress: no change  Outcome Evaluation: pt responded well to PT. pt require mod A for stand pivot t/f chair to bed. pt gets anxious and would prefer assist x2 for t/fs. SBA-CGA for bed  mobility. pt participated in LE ther ex in supine and SL. no new goals met at this time. pt would continue to benefit from PT services.   Outcome Measures     Row Name 05/26/23 1327 05/25/23 1346 05/24/23 1229       How much help from another person do you currently need...    Turning from your back to your side while in flat bed without using bedrails? 3  -AME 3  -AME 3  -AME    Moving from lying on back to sitting on the side of a flat bed without bedrails? 3  -AME 3  -AME 3  -AME    Moving to and from a bed to a chair (including a wheelchair)? 2  -AME 2  -AME 2  -AME    Standing up from a chair using your arms (e.g., wheelchair, bedside chair)? 2  -AME 3  -AME 3  -AME    Climbing 3-5 steps with a railing? 1  -AME 1  -AME 1  -AME    To walk in hospital room? 1  -AME 1  -AME 1  -AME    AM-PAC 6 Clicks Score (PT) 12  -AME 13  -AME 13  -AME       Functional Assessment    Outcome Measure Options AM-PAC 6 Clicks Basic Mobility (PT)  -AME AM-PAC 6 Clicks Basic Mobility (PT)  -AME AM-PAC 6 Clicks Basic Mobility (PT)  -AME          User Key  (r) = Recorded By, (t) = Taken By, (c) = Cosigned By    Initials Name Provider Type    Ousmane Gomes PTA Physical Therapist Assistant                 Time Calculation:    PT Charges     Row Name 05/26/23 1408             Time Calculation    Start Time 1327  -      Stop Time 1406  -      Time Calculation (min) 39 min  -AME         Time Calculation- PT    Total Timed Code Minutes- PT 39 minute(s)  -AME         Timed Charges    60624 - PT Therapeutic Exercise Minutes 24  -AME      42144 - PT Therapeutic Activity Minutes 15  -AME         Total Minutes    Timed Charges Total Minutes 39  -AME       Total Minutes 39  -AME            User Key  (r) = Recorded By, (t) = Taken By, (c) = Cosigned By    Initials Name Provider Type    Ousmane Gomes PTA Physical Therapist Assistant              Therapy Charges for Today     Code Description Service Date Service Provider Modifiers Qty    42189591992 HC PT  THERAPEUTIC ACT EA 15 MIN 5/25/2023 Ousmane Zhang, PTA GP 2    07181796793 HC PT THER PROC EA 15 MIN 5/26/2023 Ousmane Zhang, MANJINDER GP 2    34288067880 HC PT THERAPEUTIC ACT EA 15 MIN 5/26/2023 Ousmane Zhang, MANJINDER GP 1          PT G-Codes  Outcome Measure Options: AM-PAC 6 Clicks Basic Mobility (PT)  AM-PAC 6 Clicks Score (PT): 12  AM-PAC 6 Clicks Score (OT): 19    Ousmane Zhang PTA  5/26/2023

## 2023-05-26 NOTE — PLAN OF CARE
Goal Outcome Evaluation:  Plan of Care Reviewed With: patient     Problem: Adult Inpatient Plan of Care  Goal: Plan of Care Review  Outcome: Ongoing, Progressing  Flowsheets (Taken 5/26/2023 1406)  Progress: no change  Plan of Care Reviewed With: patient  Outcome Evaluation: pt responded well to PT. pt require mod A for stand pivot t/f chair to bed. pt gets anxious and would prefer assist x2 for t/fs. SBA-CGA for bed mobility. pt participated in LE ther ex in supine and SL. no new goals met at this time. pt would continue to benefit from PT services.

## 2023-05-26 NOTE — PLAN OF CARE
Goal Outcome Evaluation:  Plan of Care Reviewed With: patient        Progress: no change  Outcome Evaluation: VSS. No acute changes this shift, Patient did have large BM yesterday at change of shift per dayshift nurse. Slept most of this night.

## 2023-05-26 NOTE — ANESTHESIA PREPROCEDURE EVALUATION
Anesthesia Evaluation     Patient summary reviewed and Nursing notes reviewed   no history of anesthetic complications:  NPO Solid Status: > 8 hours  NPO Liquid Status: > 4 hours           Airway   Mallampati: II  TM distance: >3 FB  Neck ROM: full  Possible difficult intubation and Large neck circumference  Comment: General anesthesia 10/16.19, [place] #2, ETT 7.5, Grade 1. General anesthesia 2/26/21, MAC #3, ETT 7.0, Grade IIa.  Dental    (+) upper dentures and lower dentures    Pulmonary - negative pulmonary ROS    breath sounds clear to auscultation  (+) decreased breath sounds,   (-) COPD, asthma, sleep apnea, not a smoker  Cardiovascular - normal exam  Exercise tolerance: poor (<4 METS)    ECG reviewed  PT is on anticoagulation therapy  Patient on routine beta blocker and Beta blocker given within 24 hours of surgery  Rhythm: regular  Rate: normal    (+) hypertension well controlled, CAD, CABG, cardiac stents more than 12 months ago angina with exertion, CHF , hyperlipidemia,  carotid artery disease  (-) valvular problems/murmurs, dysrhythmias, murmur, PVD, DVT    ROS comment:   · Left Ventricle: Estimated EF appears to be in the range of 51 - 55%. Normal left ventricular cavity size noted  · There is moderate eccentric hypertrophy of the left ventricular cavity. Left ventricular diastolic dysfunction is noted (grade II w/high LAP) consistent with pseudonormalization.  · Right Ventricle: Normal right ventricular wall thickness, systolic function and septal motion noted. Right ventricular cavity is borderline dilated  · No evidence of a patent foramen ovale. No evidence of an atrial septal defect present. Saline test results are negative.  · The aortic valve is abnormal with mild calcification of the right coronary cusp.  · Mitral Valve: The mitral valve is abnormal in structure. There is anterior leaflet thickening present. Mild mitral valve regurgitation is present.  · Trace to mild tricuspid valve  regurgitation is present. Estimated right ventricular systolic pressure from tricuspid regurgitation is moderately elevated (45-55 mmHg)  · Mild pulmonary hypertension is present.  · There is no evidence of pericardial effusion.      Neuro/Psych  (+) numbness (both feet), psychiatric history Anxiety and Depression,    (-) seizures, TIA, CVA, headaches  GI/Hepatic/Renal/Endo    (+) morbid obesity, GERD well controlled,  liver disease history of elevated LFT, diabetes mellitus type 2 poorly controlled using insulin, thyroid problem hypothyroidism  (-) hepatitis, no renal disease    Musculoskeletal     Abdominal   (+) obese,    Substance History - negative use     OB/GYN negative ob/gyn ROS         Other   arthritis,      (-) history of cancer                    Anesthesia Plan    ASA 4 - emergent     MAC     intravenous induction     Anesthetic plan, risks, benefits, and alternatives have been provided, discussed and informed consent has been obtained with: patient.

## 2023-05-26 NOTE — PLAN OF CARE
Problem: Adult Inpatient Plan of Care  Goal: Plan of Care Review  Outcome: Ongoing, Progressing  Flowsheets  Taken 5/26/2023 1516  Progress: improving  Plan of Care Reviewed With: patient  Taken 5/26/2023 1513  Progress: improving  Plan of Care Reviewed With: patient  Outcome Evaluation: Pt supine in bed. Rolling L and R SBA. Supine to sit CGA. Sit to stand<>stand to sit CGA. Grooming SBA. UB and LB bathing and dressing SBA. T/f to bschair CGA. Feeding Independent. Pt sitting in bschair all needs in reach. Spouse present. Pt could benefit from continued OT services.   Goal Outcome Evaluation:  Plan of Care Reviewed With: patient        Progress: improving  Outcome Evaluation: Pt supine in bed. Rolling L and R SBA. Supine to sit CGA. Sit to stand<>stand to sit CGA. Grooming SBA. UB and LB bathing and dressing SBA. T/f to bschair CGA. Feeding Independent. Pt sitting in bschair all needs in reach. Spouse present. Pt could benefit from continued OT services.

## 2023-05-26 NOTE — PROGRESS NOTES
[facility] Medicine Services  INPATIENT PROGRESS NOTE    Length of Stay: 9  Date of Admission: 5/17/2023  Primary Care Physician: Dolly Foss APRN    Subjective   Chief Complaint: Right lower extremity pain and erythema, fever/chills  HPI: Patient states she is doing some better.    As of today, 05/26/23  Review of Systems   Constitutional: Negative for appetite change, chills, fatigue, fever and unexpected weight change.   Respiratory: Negative for cough, choking, chest tightness, shortness of breath and wheezing.    Cardiovascular: Negative for chest pain, palpitations and leg swelling.   Gastrointestinal: Negative for abdominal pain, blood in stool, constipation, diarrhea, nausea and vomiting.   Genitourinary: Positive for difficulty urinating. Negative for dysuria, flank pain and hematuria.   Skin: Positive for color change.   Neurological: Negative for dizziness, seizures, syncope, speech difficulty, weakness, light-headedness, numbness and headaches.   Hematological: Does not bruise/bleed easily.        All pertinent negatives and positives are as above. All other systems have been reviewed and are negative unless otherwise stated.    Objective    Temp:  [96 °F (35.6 °C)-98 °F (36.7 °C)] 97.1 °F (36.2 °C)  Heart Rate:  [69-85] 73  Resp:  [18] 18  BP: (105-148)/(51-68) 148/66    AM-PAC 6 Clicks Score (PT): 13 (05/25/23 1346)    As of today, 05/26/23  Physical Exam  Vitals reviewed.   Constitutional:       Appearance: She is well-developed.   HENT:      Head: Normocephalic and atraumatic.   Eyes:      Pupils: Pupils are equal, round, and reactive to light.   Cardiovascular:      Rate and Rhythm: Normal rate and regular rhythm.      Heart sounds: Normal heart sounds. No murmur heard.    No friction rub. No gallop.   Pulmonary:      Effort: Pulmonary effort is normal. No respiratory distress.      Breath sounds: Normal breath sounds. No wheezing or rales.    Chest:      Chest wall: No tenderness.   Abdominal:      General: Bowel sounds are normal. There is no distension.      Palpations: Abdomen is soft.      Tenderness: There is no abdominal tenderness. There is no guarding.   Musculoskeletal:      Cervical back: Normal range of motion and neck supple.      Comments: Left BKA   Skin:     General: Skin is warm and dry.      Comments: Right lower extremity erythema improving.  Blister on dorsum of right foot.  Ruptured blister on medial aspect of right distal lower extremity.   Psychiatric:         Behavior: Behavior normal.         Thought Content: Thought content normal.       Results Review:  I have reviewed the labs, radiology results, and diagnostic studies.    Laboratory Data:   Results from last 7 days   Lab Units 05/24/23  0511 05/23/23  2212 05/23/23  1706 05/23/23  0552 05/22/23  0600 05/21/23  1851 05/21/23  0605 05/20/23 2021 05/20/23  0625   SODIUM mmol/L 138  --   --  139 135*  --  136  --  136   POTASSIUM mmol/L 4.3 3.8 3.6 3.4* 3.9   < > 3.6   < > 3.0*   CHLORIDE mmol/L 95*  --   --  96* 97*  --  98  --  98   CO2 mmol/L 31.0*  --   --  34.0* 27.0  --  28.0  --  27.0   BUN mg/dL 10  --   --  10 9  --  10  --  12   CREATININE mg/dL 0.96  --   --  0.88 0.90  --  0.98  --  1.15*   GLUCOSE mg/dL 244*  --   --  156* 228*  --  236*  --  198*   CALCIUM mg/dL 9.2  --   --  9.7 9.1  --  8.9  --  8.8   BILIRUBIN mg/dL  --   --   --   --   --   --  0.6  --  0.6   ALK PHOS U/L  --   --   --   --   --   --  147*  --  105   ALT (SGPT) U/L  --   --   --   --   --   --  12  --  12   AST (SGOT) U/L  --   --   --   --   --   --  18  --  15   ANION GAP mmol/L 12.0  --   --  9.0 11.0  --  10.0  --  11.0    < > = values in this interval not displayed.     Estimated Creatinine Clearance: 85.8 mL/min (by C-G formula based on SCr of 0.96 mg/dL).  Results from last 7 days   Lab Units 05/23/23  2212 05/23/23  1706 05/21/23  0605   MAGNESIUM mg/dL 1.7 1.6 1.8         Results from  last 7 days   Lab Units 05/26/23  0543 05/25/23  0534 05/24/23  0511 05/23/23  0552 05/22/23  0600   WBC 10*3/mm3 12.86* 17.37* 13.22* 14.56* 14.84*   HEMOGLOBIN g/dL 11.4* 11.2* 12.0 11.7* 11.8*   HEMATOCRIT % 35.4 35.2 36.5 35.3 34.2   PLATELETS 10*3/mm3 568* 643* 491* 506* 401           Culture Data:   No results found for: BLOODCX  No results found for: URINECX  No results found for: RESPCX  No results found for: WOUNDCX  No results found for: STOOLCX  No components found for: BODYFLD    Radiology Data:   Imaging Results (Last 24 Hours)     ** No results found for the last 24 hours. **          I have utilized all available immediate resources to obtain, update, or review the patient's current medications (including all prescriptions, over-the-counter products, herbals, cannabis/cannabidiol products, and vitamin/mineral/dietary (nutritional) supplements).       Assessment/Plan     Active Hospital Problems    Diagnosis    • **Sepsis without acute organ dysfunction, due to unspecified organism        Plan:  1.  Right lower extremity cellulitis: Improving nicely.  Completed antibiotic course.  Wound care following.  2.  Urinary retention: Appreciate urology help.  Plan cystoscopy on Saturday.  3.  Hypertension: Well-controlled.  4.  Coronary artery disease: Continue home medication.  5.  Diabetes mellitus: Glucose still not optimally controlled.  Will increase a.m. Levemir.  6.  DVT prophylaxis: Heparin.      Medical Decision Making  Number and Complexity of problems: Several highly complex medical problems    Conditions and Status:        Condition is improving.       Discussed with: Patient and      Treatment Plan  As above    Care Planning  Shared decision making: Patient in agreement with plan of care  Code status and discussions: Full code    Disposition  Social Determinants of Health that impact treatment or disposition: None  I expect the patient to be discharged to home in 1-2 days.       The patient  was evaluated during the global COVID-19 pandemic, and the diagnosis was suspected/considered upon their initial presentation.  Evaluation, treatment, and testing were consistent with current guidelines for patients who present with complaints or symptoms that may be related to COVID-19.    I confirmed that the patient's Advance Care Plan is present, code status is documented, or surrogate decision maker is listed in the patient's medical record.        This document has been electronically signed by Mahin Esteves MD on May 26, 2023 09:47 CDT

## 2023-05-26 NOTE — PLAN OF CARE
Goal Outcome Evaluation:  Plan of Care Reviewed With: patient        Progress: improving  Outcome Evaluation: Pt sitting up in bed, right lower leg noted to be pink in color with trace edema noted, no falls noted, pain controlled with medication, no new skin injuries noted

## 2023-05-27 ENCOUNTER — ANESTHESIA (OUTPATIENT)
Dept: PERIOP | Facility: HOSPITAL | Age: 63
DRG: 872 | End: 2023-05-27
Payer: COMMERCIAL

## 2023-05-27 LAB
BASOPHILS # BLD AUTO: 0.18 10*3/MM3 (ref 0–0.2)
BASOPHILS NFR BLD AUTO: 1.6 % (ref 0–1.5)
DEPRECATED RDW RBC AUTO: 42.3 FL (ref 37–54)
EOSINOPHIL # BLD AUTO: 0.25 10*3/MM3 (ref 0–0.4)
EOSINOPHIL NFR BLD AUTO: 2.2 % (ref 0.3–6.2)
ERYTHROCYTE [DISTWIDTH] IN BLOOD BY AUTOMATED COUNT: 13.5 % (ref 12.3–15.4)
GLUCOSE BLDC GLUCOMTR-MCNC: 225 MG/DL (ref 70–130)
GLUCOSE BLDC GLUCOMTR-MCNC: 283 MG/DL (ref 70–130)
GLUCOSE BLDC GLUCOMTR-MCNC: 294 MG/DL (ref 70–130)
GLUCOSE BLDC GLUCOMTR-MCNC: 332 MG/DL (ref 70–130)
HCT VFR BLD AUTO: 34 % (ref 34–46.6)
HGB BLD-MCNC: 11.2 G/DL (ref 12–15.9)
IMM GRANULOCYTES # BLD AUTO: 0.26 10*3/MM3 (ref 0–0.05)
IMM GRANULOCYTES NFR BLD AUTO: 2.3 % (ref 0–0.5)
LYMPHOCYTES # BLD AUTO: 2.31 10*3/MM3 (ref 0.7–3.1)
LYMPHOCYTES NFR BLD AUTO: 20 % (ref 19.6–45.3)
MCH RBC QN AUTO: 28.6 PG (ref 26.6–33)
MCHC RBC AUTO-ENTMCNC: 32.9 G/DL (ref 31.5–35.7)
MCV RBC AUTO: 86.7 FL (ref 79–97)
MONOCYTES # BLD AUTO: 0.84 10*3/MM3 (ref 0.1–0.9)
MONOCYTES NFR BLD AUTO: 7.3 % (ref 5–12)
NEUTROPHILS NFR BLD AUTO: 66.6 % (ref 42.7–76)
NEUTROPHILS NFR BLD AUTO: 7.69 10*3/MM3 (ref 1.7–7)
NRBC BLD AUTO-RTO: 0 /100 WBC (ref 0–0.2)
PLATELET # BLD AUTO: 554 10*3/MM3 (ref 140–450)
PMV BLD AUTO: 9.1 FL (ref 6–12)
RBC # BLD AUTO: 3.92 10*6/MM3 (ref 3.77–5.28)
WBC NRBC COR # BLD: 11.53 10*3/MM3 (ref 3.4–10.8)

## 2023-05-27 PROCEDURE — 97110 THERAPEUTIC EXERCISES: CPT

## 2023-05-27 PROCEDURE — 25010000002 HEPARIN (PORCINE) PER 1000 UNITS: Performed by: UROLOGY

## 2023-05-27 PROCEDURE — 63710000001 INSULIN ASPART PER 5 UNITS: Performed by: UROLOGY

## 2023-05-27 PROCEDURE — 0TJB8ZZ INSPECTION OF BLADDER, VIA NATURAL OR ARTIFICIAL OPENING ENDOSCOPIC: ICD-10-PCS | Performed by: UROLOGY

## 2023-05-27 PROCEDURE — 63710000001 INSULIN DETEMIR PER 5 UNITS: Performed by: UROLOGY

## 2023-05-27 PROCEDURE — 85025 COMPLETE CBC W/AUTO DIFF WBC: CPT | Performed by: HOSPITALIST

## 2023-05-27 PROCEDURE — 25010000002 CEFAZOLIN PER 500 MG: Performed by: NURSE ANESTHETIST, CERTIFIED REGISTERED

## 2023-05-27 PROCEDURE — 82948 REAGENT STRIP/BLOOD GLUCOSE: CPT

## 2023-05-27 PROCEDURE — 25010000002 PROPOFOL 200 MG/20ML EMULSION: Performed by: NURSE ANESTHETIST, CERTIFIED REGISTERED

## 2023-05-27 PROCEDURE — 97530 THERAPEUTIC ACTIVITIES: CPT

## 2023-05-27 RX ORDER — PROPOFOL 10 MG/ML
INJECTION, EMULSION INTRAVENOUS AS NEEDED
Status: DISCONTINUED | OUTPATIENT
Start: 2023-05-27 | End: 2023-05-27 | Stop reason: SURG

## 2023-05-27 RX ORDER — LIDOCAINE HYDROCHLORIDE 20 MG/ML
JELLY TOPICAL AS NEEDED
Status: DISCONTINUED | OUTPATIENT
Start: 2023-05-27 | End: 2023-05-27 | Stop reason: HOSPADM

## 2023-05-27 RX ORDER — CEFAZOLIN SODIUM 1 G/3ML
INJECTION, POWDER, FOR SOLUTION INTRAMUSCULAR; INTRAVENOUS AS NEEDED
Status: DISCONTINUED | OUTPATIENT
Start: 2023-05-27 | End: 2023-05-27 | Stop reason: SURG

## 2023-05-27 RX ORDER — DEXTROSE AND SODIUM CHLORIDE 5; .45 G/100ML; G/100ML
100 INJECTION, SOLUTION INTRAVENOUS CONTINUOUS
Status: DISCONTINUED | OUTPATIENT
Start: 2023-05-27 | End: 2023-05-28

## 2023-05-27 RX ADMIN — TAMSULOSIN HYDROCHLORIDE 0.4 MG: 0.4 CAPSULE ORAL at 09:23

## 2023-05-27 RX ADMIN — DEXTROSE AND SODIUM CHLORIDE 100 ML/HR: 5; 450 INJECTION, SOLUTION INTRAVENOUS at 09:21

## 2023-05-27 RX ADMIN — GABAPENTIN 200 MG: 100 CAPSULE ORAL at 20:41

## 2023-05-27 RX ADMIN — INSULIN ASPART 8 UNITS: 100 INJECTION, SOLUTION INTRAVENOUS; SUBCUTANEOUS at 12:33

## 2023-05-27 RX ADMIN — SUCRALFATE 1 G: 1 TABLET ORAL at 12:34

## 2023-05-27 RX ADMIN — CLOPIDOGREL BISULFATE 75 MG: 75 TABLET ORAL at 09:22

## 2023-05-27 RX ADMIN — HYDROCODONE BITARTRATE AND ACETAMINOPHEN 1 TABLET: 7.5; 325 TABLET ORAL at 09:23

## 2023-05-27 RX ADMIN — HYDROCODONE BITARTRATE AND ACETAMINOPHEN 1 TABLET: 7.5; 325 TABLET ORAL at 03:24

## 2023-05-27 RX ADMIN — GABAPENTIN 200 MG: 100 CAPSULE ORAL at 09:23

## 2023-05-27 RX ADMIN — ISOSORBIDE MONONITRATE 30 MG: 30 TABLET, EXTENDED RELEASE ORAL at 09:22

## 2023-05-27 RX ADMIN — PROPOFOL 40 MG: 10 INJECTION, EMULSION INTRAVENOUS at 07:50

## 2023-05-27 RX ADMIN — INSULIN DETEMIR 45 UNITS: 100 INJECTION, SOLUTION SUBCUTANEOUS at 20:42

## 2023-05-27 RX ADMIN — Medication 1 APPLICATION: at 20:41

## 2023-05-27 RX ADMIN — LEVOTHYROXINE SODIUM 50 MCG: 50 TABLET ORAL at 09:24

## 2023-05-27 RX ADMIN — PANTOPRAZOLE SODIUM 40 MG: 40 TABLET, DELAYED RELEASE ORAL at 09:25

## 2023-05-27 RX ADMIN — DOCUSATE SODIUM 50 MG AND SENNOSIDES 8.6 MG 2 TABLET: 8.6; 5 TABLET, FILM COATED ORAL at 09:22

## 2023-05-27 RX ADMIN — RANOLAZINE 500 MG: 500 TABLET, FILM COATED, EXTENDED RELEASE ORAL at 20:39

## 2023-05-27 RX ADMIN — CEFAZOLIN 1 G: 330 INJECTION, POWDER, FOR SOLUTION INTRAMUSCULAR; INTRAVENOUS at 07:41

## 2023-05-27 RX ADMIN — AMLODIPINE BESYLATE 5 MG: 5 TABLET ORAL at 09:23

## 2023-05-27 RX ADMIN — HEPARIN SODIUM 5000 UNITS: 5000 INJECTION INTRAVENOUS; SUBCUTANEOUS at 14:35

## 2023-05-27 RX ADMIN — INSULIN ASPART 8 UNITS: 100 INJECTION, SOLUTION INTRAVENOUS; SUBCUTANEOUS at 18:20

## 2023-05-27 RX ADMIN — PROPOFOL 50 MG: 10 INJECTION, EMULSION INTRAVENOUS at 07:46

## 2023-05-27 RX ADMIN — PROPOFOL 50 MG: 10 INJECTION, EMULSION INTRAVENOUS at 07:39

## 2023-05-27 RX ADMIN — VENLAFAXINE HYDROCHLORIDE 75 MG: 75 CAPSULE, EXTENDED RELEASE ORAL at 09:25

## 2023-05-27 RX ADMIN — SUCRALFATE 1 G: 1 TABLET ORAL at 20:41

## 2023-05-27 RX ADMIN — HEPARIN SODIUM 5000 UNITS: 5000 INJECTION INTRAVENOUS; SUBCUTANEOUS at 20:41

## 2023-05-27 RX ADMIN — SUCRALFATE 1 G: 1 TABLET ORAL at 18:20

## 2023-05-27 RX ADMIN — FUROSEMIDE 40 MG: 40 TABLET ORAL at 09:23

## 2023-05-27 NOTE — PLAN OF CARE
Goal Outcome Evaluation:  Plan of Care Reviewed With: patient        Progress: improving  Outcome Evaluation: Room air. PRN pain pill effective for pain management. Jose in place. Plan for cysto today.

## 2023-05-27 NOTE — PLAN OF CARE
Goal Outcome Evaluation:           Progress: no change  Outcome Evaluation: cysto done today. VSS. PRN pain meds given.

## 2023-05-27 NOTE — ANESTHESIA POSTPROCEDURE EVALUATION
Patient: Ariana Martinez    Procedure Summary     Date: 05/27/23 Room / Location: [facility] OR 02 / [facility] OR    Anesthesia Start: 0731 Anesthesia Stop: 0755    Procedure: FLEXIBLE CYSTOSCOPY (Urethra) Diagnosis:       Urinary retention      (Urinary retention [R33.9])    Surgeons: Rohan Doe MD Provider: Lily Lomeli CRNA    Anesthesia Type: MAC ASA Status: 4 - Emergent          Anesthesia Type: MAC    Vitals  No vitals data found for the desired time range.          Post Anesthesia Care and Evaluation    Patient location during evaluation: floor  Patient participation: complete - patient participated  Level of consciousness: awake and alert  Pain score: 0  Pain management: adequate    Airway patency: patent  Anesthetic complications: No anesthetic complications  PONV Status: none  Cardiovascular status: acceptable  Respiratory status: acceptable  Hydration status: acceptable

## 2023-05-27 NOTE — PROGRESS NOTES
LOS: 10 days     Patient Care Team:  Dolly Foss APRN as PCP - General (Family Medicine)  Uziel Alonso MD as Consulting Physician (Hematology and Oncology)  Elvis Gamboa APRN as Nurse Practitioner (Endocrinology)  Teressa Hammond APRN as Nurse Practitioner (Oncology)      Subjective     Urinary retention    Objective       Vital Signs  Temp:  [96.9 °F (36.1 °C)-97.8 °F (36.6 °C)] 97.8 °F (36.6 °C)  Heart Rate:  [70-83] 77  Resp:  [16-18] 18  BP: (118-148)/(54-66) 120/54    Physical Exam:        General Appearance:   No distress     Respiratory:    UNLABORED RESPIRATIONS.     Abdomen:     SOFT.       Genitourinary:  Jose in place     Rectal:     DEFERRED       Results Review:       Imaging Results (Last 24 Hours)     ** No results found for the last 24 hours. **        Lab Results (last 24 hours)     Procedure Component Value Units Date/Time    CBC Auto Differential [[id number]]  (Abnormal) Collected: 05/27/23 0504    Specimen: Blood Updated: 05/27/23 0633     WBC 11.53 10*3/mm3      RBC 3.92 10*6/mm3      Hemoglobin 11.2 g/dL      Hematocrit 34.0 %      MCV 86.7 fL      MCH 28.6 pg      MCHC 32.9 g/dL      RDW 13.5 %      RDW-SD 42.3 fl      MPV 9.1 fL      Platelets 554 10*3/mm3      Neutrophil % 66.6 %      Lymphocyte % 20.0 %      Monocyte % 7.3 %      Eosinophil % 2.2 %      Basophil % 1.6 %      Immature Grans % 2.3 %      Neutrophils, Absolute 7.69 10*3/mm3      Lymphocytes, Absolute 2.31 10*3/mm3      Monocytes, Absolute 0.84 10*3/mm3      Eosinophils, Absolute 0.25 10*3/mm3      Basophils, Absolute 0.18 10*3/mm3      Immature Grans, Absolute 0.26 10*3/mm3      nRBC 0.0 /100 WBC     POC Glucose Once [[id number]]  (Abnormal) Collected: 05/26/23 1923    Specimen: Blood Updated: 05/26/23 1951     Glucose 330 mg/dL      Comment: RN NotifiedOperator: [id number] OLGA Gutierrez ID: [id number]       POC Glucose Once [[id number]]  (Abnormal) Collected: 05/26/23 1615    Specimen: Blood Updated:  05/26/23 1652     Glucose 249 mg/dL      Comment: RN NotifiedOperator: [id number] DACOSTA ALISEMeter ID: [id number]       POC Glucose Once [[id number]]  (Abnormal) Collected: 05/26/23 1145    Specimen: Blood Updated: 05/26/23 1158     Glucose 269 mg/dL      Comment: RN NotifiedOperator: [id number] DACOSTA ALISEMeter ID: [id number]       POC Glucose Once [[id number]]  (Abnormal) Collected: 05/26/23 0710    Specimen: Blood Updated: 05/26/23 0753     Glucose 225 mg/dL      Comment: RN NotifiedOperator: [id number] DACOSTA ALISEMeter ID: [id number]               I reviewed the patient's new clinical results.  I reviewed the patient's new imaging results and agree with the interpretation.  I reviewed the patient's other test results and agree with the interpretation        Assessment:    Urinary retention    Plan:     Cystoscopy risk and benefits of been discussed      Rohan Doe MD  05/27/23  07:30 CDT

## 2023-05-27 NOTE — OP NOTE
CYSTOSCOPY  Procedure Note    Ariana Martinez  5/27/2023    Pre-op Diagnosis:   Urinary retention [R33.9]    Post-op Diagnosis:     Post-Op Diagnosis Codes:     * Urinary retention [R33.9]    Procedure(s):  FLEXIBLE CYSTOSCOPY    Surgeon(s):  Rohan Doe MD    Anesthesia: Choice    Staff:   Circulator: Lily Christian RN; Judy Macias RN  Scrub Person: WillConnie hanson          Estimated Blood Loss: none    Specimens:                None      Drains:   [REMOVED] Urethral Catheter Non-latex 16 Fr. (Removed)   Daily Indications Acute Urinary Retention 05/22/23 0820   Site Assessment Clean 05/21/23 0800   Collection Container Standard drainage bag 05/22/23 0820   Securement Method Securing device 05/22/23 0820   Catheter care complete Yes 05/21/23 0800   Output (mL) 75 mL 05/21/23 1947       [REMOVED] Urethral Catheter Silicone 16 Fr. (Removed)   Daily Indications Acute Urinary Retention 05/26/23 2058   Site Assessment Clean;Skin intact 05/26/23 2058   Collection Container Standard drainage bag 05/26/23 2058   Securement Method Securing device 05/26/23 2058   Catheter care complete Yes 05/26/23 2100   Output (mL) 650 mL 05/27/23 0100       [REMOVED] External Urinary Catheter (Removed)       [REMOVED] External Urinary Catheter (Removed)   Site Assessment Clean;Skin intact 05/18/23 0714   Collection Container Wall suction 05/17/23 2045       [REMOVED] External Urinary Catheter (Removed)   Application/Removal skin care provided;external catheter applied 05/21/23 1950   Collection Container Wall suction 05/21/23 2047   Wall suction (mmHG) 125 mmHG 05/21/23 2047   Securement Method Tape 05/21/23 2047   Catheter care complete Yes 05/21/23 1950       Findings: Wide open urethra catheter cystitis    Complications: None    Indications: Same.    Description of Procedure: Patient brought the operating suite placed in the dorsolithotomy position sterile prep and drape genitalia in routine fashion we passed the flexible  cystoscope into the bladder no signs of infection tumor or calculi showed some catheter cystitis urethra was wide open left the Jose catheter out at that time    Rohan Doe MD     Date: 5/27/2023  Time: 08:04 CDT

## 2023-05-27 NOTE — PROGRESS NOTES
[facility] Medicine Services  INPATIENT PROGRESS NOTE    Length of Stay: 10  Date of Admission: 5/17/2023  Primary Care Physician: Dolly Foss APRN    Subjective   Chief Complaint: Right lower extremity pain and erythema, fever/chills  HPI: Cystoscopy this morning.  Doing well post cystoscopy.    As of today, 05/27/23  Review of Systems   Constitutional: Negative for appetite change, chills, fatigue, fever and unexpected weight change.   Respiratory: Negative for cough, choking, chest tightness, shortness of breath and wheezing.    Cardiovascular: Negative for chest pain, palpitations and leg swelling.   Gastrointestinal: Negative for abdominal pain, blood in stool, constipation, diarrhea, nausea and vomiting.   Genitourinary: Positive for difficulty urinating. Negative for dysuria, flank pain and hematuria.   Skin: Positive for color change.   Neurological: Negative for dizziness, seizures, syncope, speech difficulty, weakness, light-headedness, numbness and headaches.   Hematological: Does not bruise/bleed easily.        All pertinent negatives and positives are as above. All other systems have been reviewed and are negative unless otherwise stated.    Objective    Temp:  [96.9 °F (36.1 °C)-97.8 °F (36.6 °C)] 97.8 °F (36.6 °C)  Heart Rate:  [71-80] 71  Resp:  [16-18] 18  BP: (109-127)/(50-64) 109/50    AM-PAC 6 Clicks Score (PT): 15 (05/27/23 0948)    As of today, 05/27/23  Physical Exam  Vitals reviewed.   Constitutional:       Appearance: She is well-developed.   HENT:      Head: Normocephalic and atraumatic.   Eyes:      Pupils: Pupils are equal, round, and reactive to light.   Cardiovascular:      Rate and Rhythm: Normal rate and regular rhythm.      Heart sounds: Normal heart sounds. No murmur heard.    No friction rub. No gallop.   Pulmonary:      Effort: Pulmonary effort is normal. No respiratory distress.      Breath sounds: Normal breath sounds. No  wheezing or rales.   Chest:      Chest wall: No tenderness.   Abdominal:      General: Bowel sounds are normal. There is no distension.      Palpations: Abdomen is soft.      Tenderness: There is no abdominal tenderness. There is no guarding.   Musculoskeletal:      Cervical back: Normal range of motion and neck supple.      Comments: Left BKA   Skin:     General: Skin is warm and dry.      Comments: Right lower extremity erythema improving.  Blister on dorsum of right foot.  Ruptured blister on medial aspect of right distal lower extremity.   Psychiatric:         Behavior: Behavior normal.         Thought Content: Thought content normal.       Results Review:  I have reviewed the labs, radiology results, and diagnostic studies.    Laboratory Data:   Results from last 7 days   Lab Units 05/24/23  0511 05/23/23  2212 05/23/23  1706 05/23/23  0552 05/22/23  0600 05/21/23  1851 05/21/23  0605   SODIUM mmol/L 138  --   --  139 135*  --  136   POTASSIUM mmol/L 4.3 3.8 3.6 3.4* 3.9   < > 3.6   CHLORIDE mmol/L 95*  --   --  96* 97*  --  98   CO2 mmol/L 31.0*  --   --  34.0* 27.0  --  28.0   BUN mg/dL 10  --   --  10 9  --  10   CREATININE mg/dL 0.96  --   --  0.88 0.90  --  0.98   GLUCOSE mg/dL 244*  --   --  156* 228*  --  236*   CALCIUM mg/dL 9.2  --   --  9.7 9.1  --  8.9   BILIRUBIN mg/dL  --   --   --   --   --   --  0.6   ALK PHOS U/L  --   --   --   --   --   --  147*   ALT (SGPT) U/L  --   --   --   --   --   --  12   AST (SGOT) U/L  --   --   --   --   --   --  18   ANION GAP mmol/L 12.0  --   --  9.0 11.0  --  10.0    < > = values in this interval not displayed.     Estimated Creatinine Clearance: 86.2 mL/min (by C-G formula based on SCr of 0.96 mg/dL).  Results from last 7 days   Lab Units 05/23/23 2212 05/23/23  1706 05/21/23  0605   MAGNESIUM mg/dL 1.7 1.6 1.8         Results from last 7 days   Lab Units 05/27/23  0504 05/26/23  0543 05/25/23  0534 05/24/23  0511 05/23/23  0552   WBC 10*3/mm3 11.53* 12.86*  17.37* 13.22* 14.56*   HEMOGLOBIN g/dL 11.2* 11.4* 11.2* 12.0 11.7*   HEMATOCRIT % 34.0 35.4 35.2 36.5 35.3   PLATELETS 10*3/mm3 554* 568* 643* 491* 506*           Culture Data:   No results found for: BLOODCX  No results found for: URINECX  No results found for: RESPCX  No results found for: WOUNDCX  No results found for: STOOLCX  No components found for: BODYFLD    Radiology Data:   Imaging Results (Last 24 Hours)     ** No results found for the last 24 hours. **          I have utilized all available immediate resources to obtain, update, or review the patient's current medications (including all prescriptions, over-the-counter products, herbals, cannabis/cannabidiol products, and vitamin/mineral/dietary (nutritional) supplements).       Assessment/Plan     Active Hospital Problems    Diagnosis    • **Sepsis without acute organ dysfunction, due to unspecified organism    • Urinary retention        Plan:  1.  Right lower extremity cellulitis: Improving nicely.  Completed antibiotic course.  Wound care following.  2.  Urinary retention: Appreciate urology help.  Cystoscopy this morning.  Jose catheter out.  3.  Hypertension: Well-controlled.  4.  Coronary artery disease: Continue home medication.  5.  Diabetes mellitus: Glucose still not optimally controlled.  A.m. Levemir increased yesterday.  Monitor for today.    6.  DVT prophylaxis: Heparin.      Medical Decision Making  Number and Complexity of problems: Several highly complex medical problems    Conditions and Status:        Condition is improving.       Discussed with: Patient and      Treatment Plan  As above    Care Planning  Shared decision making: Patient in agreement with plan of care  Code status and discussions: Full code    Disposition  Social Determinants of Health that impact treatment or disposition: None  I expect the patient to be discharged to home in 1-2 days.       The patient was evaluated during the global COVID-19 pandemic, and the  diagnosis was suspected/considered upon their initial presentation.  Evaluation, treatment, and testing were consistent with current guidelines for patients who present with complaints or symptoms that may be related to COVID-19.    I confirmed that the patient's Advance Care Plan is present, code status is documented, or surrogate decision maker is listed in the patient's medical record.        This document has been electronically signed by Mhain Esteves MD on May 27, 2023 14:06 CDT

## 2023-05-27 NOTE — THERAPY TREATMENT NOTE
Acute Care - Physical Therapy Treatment Note  [facility]     Patient Name: Ariana Martinez  : 1960  MRN: [id number]  Today's Date: 2023      Visit Dx:     ICD-10-CM ICD-9-CM   1. Sepsis without acute organ dysfunction, due to unspecified organism  A41.9 038.9     995.91   2. Cellulitis of right lower extremity  L03.115 682.6   3. Type 2 diabetes mellitus with hyperglycemia, unspecified whether long term insulin use  E11.65 250.00   4. Impaired mobility and activities of daily living  Z74.09 V49.89    Z78.9    5. Impaired physical mobility  Z74.09 781.99   6. Impaired mobility and ADLs  Z74.09 V49.89    Z78.9    7. Urinary retention  R33.9 788.20     Patient Active Problem List   Diagnosis   • 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, with long-term current use of insulin   • Closed nondisplaced fracture of fifth left metatarsal bone   • MAURICIO (generalized anxiety disorder)   • Depression, major, recurrent, moderate ([facility])   • GERD without esophagitis   • Long term prescription opiate use   • Mixed hyperlipidemia   • Vitamin D deficiency   • Seasonal allergic rhinitis   • Restrictive lung disease secondary to obesity   • Adult body mass index 37.0-37.9   • Snoring   • Class 3 severe obesity due to excess calories without serious comorbidity with body mass index (BMI) of 40.0 to 44.9 in adult ([facility])   • (HFpEF) heart failure with preserved ejection fraction   • Type 2 diabetes mellitus with hyperglycemia, with long-term current use of insulin ([facility])   • Cyanocobalamin deficiency   • Coronary artery disease of native artery of native heart with stable angina pectoris   • Hypertension   • Meniere's disease   • Gastroparesis   • Pulmonary hypertension ([facility])   • Pes cavus   • Primary osteoarthritis involving multiple joints   • Generalized anxiety disorder   • Chronic right-sided low back pain with right-sided sciatica   • Chest pain   • Adverse effect of iron   • Chest pain due to  myocardial ischemia   • Nonrheumatic tricuspid valve regurgitation   • Iron deficiency anemia due to chronic blood loss   • Malaise and fatigue   • Ankle arthritis   • Urinary retention   • Endocarditis   • Essential hypertension   • Occlusion and stenosis of bilateral carotid arteries   • S/P BKA (below knee amputation) unilateral, left (HCC)   • Phantom pain after amputation of lower extremity   • S/P CABG (coronary artery bypass graft)   • Severe malnutrition   • TIA (transient ischemic attack)   • Coronary artery abnormality   • Elevated d-dimer   • Neuropathy   • Chest pain, unspecified type   • Acute pain of right shoulder   • Rotator cuff syndrome, right   • Acquired hypothyroidism   • Bilateral leg edema   • Left elbow pain   • Gastritis   • Olecranon bursitis, left elbow   • Sepsis without acute organ dysfunction, due to unspecified organism     Past Medical History:   Diagnosis Date   • Acute bacterial endocarditis 3/13/2021   • Angina, class IV    • Anxiety    • Anxiety and depression    • Arthritis    • Benign paroxysmal positional vertigo    • Bladder disorder     has bladder stimulator   • Carpal tunnel syndrome    • CHF (congestive heart failure)    • Chronic pain    • Coronary atherosclerosis     hx CABG 2005.  is followed by Dr Kwon   • Depression    • Diabetes mellitus     Type 2, controlled   • Diabetic polyneuropathy    • Disease of thyroid gland    • Elevated cholesterol    • Female stress incontinence    • Foot pain, left    • Full dentures    • Gastroparesis    • GERD (gastroesophageal reflux disease)    • Hyperlipidemia    • Hypertension    • Low back pain    • Malaise and fatigue    • Multiple joint pain    • Obesity     Refuses to be weighed   • Occlusion and stenosis of bilateral carotid arteries 6/18/2021   • Otalgia     Both   • Perforation of tympanic membrane     Left   • Postoperative wound infection    • Vitamin D deficiency    • Wears glasses     reading     Past Surgical  History:   Procedure Laterality Date   • ABDOMINAL SURGERY     • AMPUTATION FOOT     • ANGIOPLASTY      coronary   • ANKLE ARTHROSCOPY Left 02/26/2021    Procedure: Left foot hardware removal, ankle arthroscopy, bone grafting , left foot exostectomy;  Surgeon: Ignacio Lord DPM;  Location: [facility] OR;  Service: Podiatry;  Laterality: Left;   • BREAST BIOPSY Right    • CARDIAC CATHETERIZATION     • CARDIAC CATHETERIZATION N/A 06/20/2017    Procedure: Right Heart Cath;  Surgeon: Can Kwon MD PhD;  Location: [facility] CATH INVASIVE LOCATION;  Service:    • CARDIAC CATHETERIZATION N/A 02/18/2020    Procedure: Left Heart Cath;  Surgeon: Catalina Cooper MD;  Location: [facility] CATH INVASIVE LOCATION;  Service: Cardiology;  Laterality: N/A;   • CARDIAC CATHETERIZATION N/A 04/06/2022    Procedure: Left Heart Cath;  Surgeon: Sheryl Navas MD;  Location: [facility] CATH INVASIVE LOCATION;  Service: Cardiology;  Laterality: N/A;   • CARPAL TUNNEL RELEASE     • CHOLECYSTECTOMY     • COLONOSCOPY N/A 06/24/2020    Procedure: COLONOSCOPY;  Surgeon: Julián Maldonado MD;  Location: [facility] ENDOSCOPY;  Service: Gastroenterology;  Laterality: N/A;   • CORONARY ARTERY BYPASS GRAFT  2005   • ENDOSCOPY N/A 10/19/2018    Procedure: ESOPHAGOGASTRODUODENOSCOPY possible dilation;  Surgeon: Julián Maldonado MD;  Location: [facility] ENDOSCOPY;  Service: Gastroenterology   • ENDOSCOPY N/A 06/24/2020    Procedure: ESOPHAGOGASTRODUODENOSCOPY WED appt please;  Surgeon: Julián Maldonado MD;  Location: [facility] ENDOSCOPY;  Service: Gastroenterology;  Laterality: N/A;   • ENDOSCOPY N/A 06/10/2022    Procedure: ESOPHAGOGASTRODUODENOSCOPY   room 380;  Surgeon: Jeremiah Wilkins MD;  Location: [facility] ENDOSCOPY;  Service: Gastroenterology;  Laterality: N/A;   • ENDOSCOPY N/A 1/10/2023    Procedure: ESOPHAGOGASTRODUODENOSCOPY;  Surgeon: Jeremiah Wilkins MD;  Location: [facility] ENDOSCOPY;  Service: Gastroenterology;  Laterality: N/A;   •  ENDOSCOPY AND COLONOSCOPY     • FOOT SURGERY      Toes   • FOOT SURGERY     • GASTRIC BANDING      Revision, laparoscopic   • HYSTERECTOMY     • INCISION AND DRAINAGE LEG Left 03/12/2021    Procedure: Left ankle arthroscopic irrigation and debridement, screw removal;  Surgeon: Ignacio Lord DPM;  Location: [facility];  Service: Podiatry;  Laterality: Left;   • MOUTH SURGERY     • SALPINGO OOPHORECTOMY     • SHOULDER SURGERY     • SUBTALAR ARTHRODESIS Left 01/16/2019    Procedure: LEFT FOOT HARDWARE REMOVAL, FIFTH METATARSAL , OPEN REDUCTION INTERNAL FIXATION, CALCANEAL OSTEOTOMY;  Surgeon: Ignacio Lord DPM;  Location: [facility];  Service: Podiatry   • SUBTALAR ARTHRODESIS Left 10/16/2019    Procedure: foot hardware removal, subtalar joint fusion  possible de/reattachment of achilles tendon        (c-arm);  Surgeon: Ignacio Lord DPM;  Location: [facility];  Service: Podiatry   • SUBTALAR ARTHRODESIS Left 09/30/2020    Procedure: subtalar, talonavicular joint arthrodesis.  Removal hardware.          (c-arm);  Surgeon: Ignacio Lord DPM;  Location: [facility];  Service: Podiatry;  Laterality: Left;   • TRANSESOPHAGEAL ECHOCARDIOGRAM (LAMONTE)      With color flow     PT Assessment (last 12 hours)     PT Evaluation and Treatment     Row Name 05/27/23 0917          Physical Therapy Time and Intention    Subjective Information no complaints  -EM     Document Type therapy note (daily note)  -EM     Mode of Treatment individual therapy;physical therapy  -EM     Patient Effort good  -EM     Row Name 05/27/23 0917          Pain    Pretreatment Pain Rating 7/10  -EM     Posttreatment Pain Rating 4/10  -EM     Pain Location - Side/Orientation Bilateral  -EM     Pain Location lower  -EM     Pain Location - back  -EM     Pain Intervention(s) Medication (See MAR);Repositioned  -EM     Row Name 05/27/23 0917          Cognition    Affect/Mental Status (Cognition) WFL  -EM     Orientation Status (Cognition)  "oriented x 4  -EM     Follows Commands (Cognition) WNL  -EM     Personal Safety Interventions fall prevention program maintained;gait belt;nonskid shoes/slippers when out of bed;supervised activity  -EM     Row Name 05/27/23 0917          Bed Mobility    Supine-Sit [place] (Bed Mobility) standby assist  -EM     Sit-Supine [place] (Bed Mobility) standby assist  -EM     Row Name 05/27/23 0917          Sit-Stand Transfer    Sit-Stand [place] (Transfers) contact guard;1 person assist  -EM     Assistive Device (Sit-Stand Transfers) walker, front-wheeled  -EM     Comment, (Sit-Stand Transfer) Pt stood while bed was changed and dep pericare was performed.  -EM     Row Name 05/27/23 0917          Stand-Sit Transfer    Stand-Sit [place] (Transfers) contact guard;1 person assist  -EM     Assistive Device (Stand-Sit Transfers) walker, front-wheeled  -EM     Row Name 05/27/23 0917          Gait/Stairs (Locomotion)    [place] Level (Gait) contact guard  -EM     Assistive Device (Gait) walker, front-wheeled  -EM     Distance in Feet (Gait) 2' side stepping at EOB  -EM     Comment, (Gait/Stairs) Pt very fearful of falling. Pt declined further gt due to fear stating \"I'll do more tomorrow\"   -EM     Row Name 05/27/23 0917          Hip (Therapeutic Exercise)    Hip Strengthening (Therapeutic Exercise) bilateral;marching while seated;aDduction;sitting;2 sets;10 repetitions  -EM     Row Name 05/27/23 0917          Knee (Therapeutic Exercise)    Knee AROM (Therapeutic Exercise) bilateral;SLR (straight leg raise);LAQ (long arc quad);sitting;supine;2 sets;10 repetitions  -EM     Row Name 05/27/23 0917          Ankle (Therapeutic Exercise)    Ankle AROM (Therapeutic Exercise) bilateral;dorsiflexion;plantarflexion;sitting;2 sets;10 repetitions  -EM     Row Name             Wound 05/17/23 1828 Right anterior fifth toe    Wound - Properties Group Placement Date: 05/17/23  - Placement Time: 1828 -JH Side: Right  " -JH Orientation: anterior  -JH Location: fifth toe  -JH    Retired Wound - Properties Group Placement Date: 05/17/23  -JH Placement Time: 1828  -JH Side: Right  -JH Orientation: anterior  -JH Location: fifth toe  -JH    Retired Wound - Properties Group Date first assessed: 05/17/23  -JH Time first assessed: 1828  -JH Side: Right  -JH Location: fifth toe  -JH    Row Name             Wound 05/23/23 1006 Right distal leg    Wound - Properties Group Placement Date: 05/23/23  -LH Placement Time: 1006  -LH Present on Hospital Admission: N  -LH Side: Right  -LH Orientation: distal  -LH Location: leg  -LH    Retired Wound - Properties Group Placement Date: 05/23/23  - Placement Time: 1006  -LH Present on Hospital Admission: N  -LH Side: Right  -LH Orientation: distal  -LH Location: leg  -LH    Retired Wound - Properties Group Date first assessed: 05/23/23  - Time first assessed: 1006  -LH Present on Hospital Admission: N  -LH Side: Right  -LH Location: leg  -LH    Row Name 05/27/23 0917          Vital Signs    Pre Systolic BP Rehab 112  -EM     Pre Treatment Diastolic BP 53  -EM     Pretreatment Heart Rate (beats/min) 81  -EM     Pre SpO2 (%) 95  -EM     O2 Delivery Pre Treatment room air  -EM     Pre Patient Position Supine  -EM     Intra Patient Position Standing  -EM     Post Patient Position Supine  -EM     Row Name 05/27/23 0917          Positioning and Restraints    Pre-Treatment Position in bed  -EM     Post Treatment Position bed  -EM     In Bed supine;call light within reach;encouraged to call for assist;exit alarm on  bed gatched  -EM     Row Name 05/27/23 0917          Bed Mobility Goal 1 (PT)    Activity/Assistive Device (Bed Mobility Goal 1, PT) bed mobility activities, all  -EM     [place] Level/Cues Needed (Bed Mobility Goal 1, PT) standby assist;independent  -EM     Progress/Outcomes (Bed Mobility Goal 1, PT) goal met  -EM     Row Name 05/27/23 0917          Transfer Goal 1 (PT)     Activity/Assistive Device (Transfer Goal 1, PT) sit-to-stand/stand-to-sit;bed-to-chair/chair-to-bed  -EM     [place] Level/Cues Needed (Transfer Goal 1, PT) contact guard required;standby assist;modified independence  -EM     Time Frame (Transfer Goal 1, PT) by discharge  -EM     Progress/Outcome (Transfer Goal 1, PT) goal not met  -EM     Row Name 05/27/23 0917          Gait Training Goal 1 (PT)    Activity/Assistive Device (Gait Training Goal 1, PT) gait (walking locomotion);decrease fall risk  -EM     [place] Level (Gait Training Goal 1, PT) contact guard required;standby assist;modified independence  -EM     Distance (Gait Training Goal 1, PT) 50ft or more each trip  -EM     Time Frame (Gait Training Goal 1, PT) 1 week  -EM     Progress/Outcome (Gait Training Goal 1, PT) goal not met  -EM     Row Name 05/27/23 0917          ROM Goal 1 (PT)    ROM Goal 1 (PT) Pt will tolerate LE exercises OOB in chair with VSS  -EM     Time Frame (ROM Goal 1, PT) by discharge  -EM     Progress/Outcome (ROM Goal 1, PT) goal not met  -EM           User Key  (r) = Recorded By, (t) = Taken By, (c) = Cosigned By    Initials Name Provider Type    Barry Bhatt PTA Physical Therapist Assistant    Juany Sage, RN Registered Nurse    Janae Severino LPN Licensed Nurse                Physical Therapy Education     Title: PT OT SLP Therapies (In Progress)     Topic: Physical Therapy (In Progress)     Point: Mobility training (In Progress)     Learning Progress Summary           Patient Acceptance, E, NR by AME at 5/26/2023 1356    Acceptance, E,TB, VU by KIMBERLY at 5/25/2023 1459    Acceptance, E, NR by AME at 5/24/2023 1306    Acceptance, E,TB, VU by NB at 5/18/2023 2138    Acceptance, E, NR by AME1 at 5/18/2023 1407                   Point: Home exercise program (Done)     Learning Progress Summary           Patient Acceptance, E,TB, VU by KIMBERLY at 5/25/2023 1459                   Point: Body mechanics (Done)     Learning  Progress Summary           Patient Acceptance, E,TB, VU by  at 5/25/2023 1459    Acceptance, E,TB, VU by  at 5/25/2023 1451                   Point: Precautions (Done)     Learning Progress Summary           Patient Acceptance, E,TB, VU by  at 5/25/2023 1459    Acceptance, E, NR by [facility] at 5/18/2023 1407                               User Key     Initials Effective Dates Name Provider Type Discipline    [facility] 06/16/21 -  Aure Villaseñor, PT Physical Therapist PT     06/16/21 -  Ousmane Zhang PTA Physical Therapist Assistant PT     06/16/21 -  Sarah Irby COTA Occupational Therapist Assistant OT    NB 06/16/21 -  Samantha Cuellar RN Registered Nurse Nurse     09/08/22 -  Janae Amaya LPN Licensed Nurse Nurse              PT Recommendation and Plan  Anticipated Discharge Disposition (PT): home with assist, home with home health, home with outpatient therapy services  Plan of Care Reviewed With: patient  Progress: improving  Outcome Evaluation: Pt becki tx well with progressed therex regime and activity. Pt t/f sup-sit-sup with SBAx1. Sit-stand-sit with CGAx1. Pt amb ~2' via side stepping at EOB. Gt is severely limited due to pt fear of falling at this time. Pt stood for several  min for bed change and dep pericare. Pt performed B LE therex x20 each in sitting and supine. Pt declined OOB to chair likely due to fear of falling.   Outcome Measures     Row Name 05/26/23 1327 05/25/23 1346 05/24/23 1229       How much help from another person do you currently need...    Turning from your back to your side while in flat bed without using bedrails? 3  -AME 3  -AME 3  -AME    Moving from lying on back to sitting on the side of a flat bed without bedrails? 3  -AME 3  -AME 3  -AME    Moving to and from a bed to a chair (including a wheelchair)? 2  -AME 2  -AME 2  -AME    Standing up from a chair using your arms (e.g., wheelchair, bedside chair)? 2  -AME 3  -AME 3  -AME    Climbing 3-5 steps with a railing? 1  -AME 1   -AME 1  -AME    To walk in hospital room? 1  -AME 1  -AME 1  -AME    AM-PAC 6 Clicks Score (PT) 12  -AME 13  -AME 13  -       Functional Assessment    Outcome Measure Options AM-PAC 6 Clicks Basic Mobility (PT)  -AME AM-PAC 6 Clicks Basic Mobility (PT)  -AME AM-PAC 6 Clicks Basic Mobility (PT)  -AME          User Key  (r) = Recorded By, (t) = Taken By, (c) = Cosigned By    Initials Name Provider Type    AME Ousmane Zhang PTA Physical Therapist Assistant                 Time Calculation:    PT Charges     Row Name 05/27/23 1152             Time Calculation    Start Time 0917  -EM      Stop Time 0958  -EM      Time Calculation (min) 41 min  -EM         Time Calculation- PT    Total Timed Code Minutes- PT 41 minute(s)  -EM            User Key  (r) = Recorded By, (t) = Taken By, (c) = Cosigned By    Initials Name Provider Type    EM Barry Gmaboa PTA Physical Therapist Assistant              Therapy Charges for Today     Code Description Service Date Service Provider Modifiers Qty    01654586208 HC PT THERAPEUTIC ACT EA 15 MIN 5/27/2023 Barry Gamboa PTA GP, CQ 2    25746493856 HC PT THER PROC EA 15 MIN 5/27/2023 Barry Gamboa PTA GP, CQ 1          PT G-Codes  Outcome Measure Options: AM-PAC 6 Clicks Basic Mobility (PT)  AM-PAC 6 Clicks Score (PT): 15  AM-PAC 6 Clicks Score (OT): 20    Barry Gamboa PTA  5/27/2023

## 2023-05-27 NOTE — PLAN OF CARE
Goal Outcome Evaluation:  Plan of Care Reviewed With: patient        Progress: improving  Outcome Evaluation: Pt becki tx well with progressed therex regime and activity. Pt t/f sup-sit-sup with SBAx1. Sit-stand-sit with CGAx1. Pt amb ~2' via side stepping at EOB. Gt is severely limited due to pt fear of falling at this time. Pt stood for several  min for bed change and dep pericare. Pt performed B LE therex x20 each in sitting and supine. Pt declined OOB to chair likely due to fear of falling.

## 2023-05-28 LAB
ANION GAP SERPL CALCULATED.3IONS-SCNC: 10 MMOL/L (ref 5–15)
BASOPHILS # BLD AUTO: 0.07 10*3/MM3 (ref 0–0.2)
BASOPHILS NFR BLD AUTO: 0.6 % (ref 0–1.5)
BUN SERPL-MCNC: 12 MG/DL (ref 8–23)
BUN/CREAT SERPL: 13 (ref 7–25)
CALCIUM SPEC-SCNC: 9.3 MG/DL (ref 8.6–10.5)
CHLORIDE SERPL-SCNC: 98 MMOL/L (ref 98–107)
CO2 SERPL-SCNC: 28 MMOL/L (ref 22–29)
CREAT SERPL-MCNC: 0.92 MG/DL (ref 0.57–1)
DEPRECATED RDW RBC AUTO: 42.9 FL (ref 37–54)
EGFRCR SERPLBLD CKD-EPI 2021: 71 ML/MIN/1.73
EOSINOPHIL # BLD AUTO: 0.24 10*3/MM3 (ref 0–0.4)
EOSINOPHIL NFR BLD AUTO: 2 % (ref 0.3–6.2)
ERYTHROCYTE [DISTWIDTH] IN BLOOD BY AUTOMATED COUNT: 13.3 % (ref 12.3–15.4)
GLUCOSE BLDC GLUCOMTR-MCNC: 243 MG/DL (ref 70–130)
GLUCOSE BLDC GLUCOMTR-MCNC: 288 MG/DL (ref 70–130)
GLUCOSE BLDC GLUCOMTR-MCNC: 305 MG/DL (ref 70–130)
GLUCOSE BLDC GLUCOMTR-MCNC: 334 MG/DL (ref 70–130)
GLUCOSE SERPL-MCNC: 295 MG/DL (ref 65–99)
HCT VFR BLD AUTO: 35.7 % (ref 34–46.6)
HGB BLD-MCNC: 11.6 G/DL (ref 12–15.9)
IMM GRANULOCYTES # BLD AUTO: 0.11 10*3/MM3 (ref 0–0.05)
IMM GRANULOCYTES NFR BLD AUTO: 0.9 % (ref 0–0.5)
LYMPHOCYTES # BLD AUTO: 1.87 10*3/MM3 (ref 0.7–3.1)
LYMPHOCYTES NFR BLD AUTO: 15.8 % (ref 19.6–45.3)
MCH RBC QN AUTO: 28.7 PG (ref 26.6–33)
MCHC RBC AUTO-ENTMCNC: 32.5 G/DL (ref 31.5–35.7)
MCV RBC AUTO: 88.4 FL (ref 79–97)
MONOCYTES # BLD AUTO: 0.71 10*3/MM3 (ref 0.1–0.9)
MONOCYTES NFR BLD AUTO: 6 % (ref 5–12)
NEUTROPHILS NFR BLD AUTO: 74.7 % (ref 42.7–76)
NEUTROPHILS NFR BLD AUTO: 8.87 10*3/MM3 (ref 1.7–7)
NRBC BLD AUTO-RTO: 0 /100 WBC (ref 0–0.2)
PLATELET # BLD AUTO: 488 10*3/MM3 (ref 140–450)
PMV BLD AUTO: 9.1 FL (ref 6–12)
POTASSIUM SERPL-SCNC: 4 MMOL/L (ref 3.5–5.2)
RBC # BLD AUTO: 4.04 10*6/MM3 (ref 3.77–5.28)
SODIUM SERPL-SCNC: 136 MMOL/L (ref 136–145)
WBC NRBC COR # BLD: 11.87 10*3/MM3 (ref 3.4–10.8)

## 2023-05-28 PROCEDURE — 82948 REAGENT STRIP/BLOOD GLUCOSE: CPT

## 2023-05-28 PROCEDURE — 80048 BASIC METABOLIC PNL TOTAL CA: CPT | Performed by: UROLOGY

## 2023-05-28 PROCEDURE — 63710000001 INSULIN DETEMIR PER 5 UNITS: Performed by: UROLOGY

## 2023-05-28 PROCEDURE — 85025 COMPLETE CBC W/AUTO DIFF WBC: CPT | Performed by: UROLOGY

## 2023-05-28 PROCEDURE — 63710000001 INSULIN ASPART PER 5 UNITS: Performed by: UROLOGY

## 2023-05-28 PROCEDURE — 25010000002 HEPARIN (PORCINE) PER 1000 UNITS: Performed by: UROLOGY

## 2023-05-28 RX ADMIN — GABAPENTIN 200 MG: 100 CAPSULE ORAL at 09:29

## 2023-05-28 RX ADMIN — INSULIN ASPART 8 UNITS: 100 INJECTION, SOLUTION INTRAVENOUS; SUBCUTANEOUS at 11:04

## 2023-05-28 RX ADMIN — HYDROCODONE BITARTRATE AND ACETAMINOPHEN 1 TABLET: 7.5; 325 TABLET ORAL at 18:25

## 2023-05-28 RX ADMIN — TAMSULOSIN HYDROCHLORIDE 0.4 MG: 0.4 CAPSULE ORAL at 09:29

## 2023-05-28 RX ADMIN — GABAPENTIN 200 MG: 100 CAPSULE ORAL at 20:39

## 2023-05-28 RX ADMIN — HYDROCODONE BITARTRATE AND ACETAMINOPHEN 1 TABLET: 7.5; 325 TABLET ORAL at 01:07

## 2023-05-28 RX ADMIN — INSULIN ASPART 5 UNITS: 100 INJECTION, SOLUTION INTRAVENOUS; SUBCUTANEOUS at 18:24

## 2023-05-28 RX ADMIN — LEVOTHYROXINE SODIUM 50 MCG: 50 TABLET ORAL at 05:06

## 2023-05-28 RX ADMIN — LORAZEPAM 0.5 MG: 0.5 TABLET ORAL at 10:57

## 2023-05-28 RX ADMIN — VENLAFAXINE HYDROCHLORIDE 75 MG: 75 CAPSULE, EXTENDED RELEASE ORAL at 09:28

## 2023-05-28 RX ADMIN — HEPARIN SODIUM 5000 UNITS: 5000 INJECTION INTRAVENOUS; SUBCUTANEOUS at 05:06

## 2023-05-28 RX ADMIN — AMLODIPINE BESYLATE 5 MG: 5 TABLET ORAL at 09:29

## 2023-05-28 RX ADMIN — HEPARIN SODIUM 5000 UNITS: 5000 INJECTION INTRAVENOUS; SUBCUTANEOUS at 20:39

## 2023-05-28 RX ADMIN — CLOPIDOGREL BISULFATE 75 MG: 75 TABLET ORAL at 09:29

## 2023-05-28 RX ADMIN — RANOLAZINE 500 MG: 500 TABLET, FILM COATED, EXTENDED RELEASE ORAL at 09:28

## 2023-05-28 RX ADMIN — FUROSEMIDE 40 MG: 40 TABLET ORAL at 09:28

## 2023-05-28 RX ADMIN — Medication 1 APPLICATION: at 20:40

## 2023-05-28 RX ADMIN — ISOSORBIDE MONONITRATE 30 MG: 30 TABLET, EXTENDED RELEASE ORAL at 09:29

## 2023-05-28 RX ADMIN — PANTOPRAZOLE SODIUM 40 MG: 40 TABLET, DELAYED RELEASE ORAL at 09:29

## 2023-05-28 RX ADMIN — HYDROCHLOROTHIAZIDE 12.5 MG: 12.5 TABLET ORAL at 09:29

## 2023-05-28 RX ADMIN — SUCRALFATE 1 G: 1 TABLET ORAL at 09:29

## 2023-05-28 RX ADMIN — SUCRALFATE 1 G: 1 TABLET ORAL at 18:25

## 2023-05-28 RX ADMIN — INSULIN DETEMIR 25 UNITS: 100 INJECTION, SOLUTION SUBCUTANEOUS at 09:30

## 2023-05-28 RX ADMIN — INSULIN DETEMIR 45 UNITS: 100 INJECTION, SOLUTION SUBCUTANEOUS at 20:40

## 2023-05-28 RX ADMIN — HEPARIN SODIUM 5000 UNITS: 5000 INJECTION INTRAVENOUS; SUBCUTANEOUS at 15:45

## 2023-05-28 RX ADMIN — INSULIN ASPART 10 UNITS: 100 INJECTION, SOLUTION INTRAVENOUS; SUBCUTANEOUS at 08:00

## 2023-05-28 RX ADMIN — Medication 1 APPLICATION: at 11:02

## 2023-05-28 RX ADMIN — ARIPIPRAZOLE 5 MG: 5 TABLET ORAL at 09:29

## 2023-05-28 RX ADMIN — SUCRALFATE 1 G: 1 TABLET ORAL at 20:40

## 2023-05-28 RX ADMIN — ATORVASTATIN CALCIUM 80 MG: 40 TABLET, FILM COATED ORAL at 09:29

## 2023-05-28 RX ADMIN — HYDROCODONE BITARTRATE AND ACETAMINOPHEN 1 TABLET: 7.5; 325 TABLET ORAL at 10:57

## 2023-05-28 RX ADMIN — SUCRALFATE 1 G: 1 TABLET ORAL at 12:31

## 2023-05-28 RX ADMIN — ASPIRIN 325 MG: 325 TABLET, COATED ORAL at 09:29

## 2023-05-28 RX ADMIN — FOLIC ACID 1000 MCG: 1 TABLET ORAL at 09:28

## 2023-05-28 RX ADMIN — RANOLAZINE 500 MG: 500 TABLET, FILM COATED, EXTENDED RELEASE ORAL at 20:39

## 2023-05-28 NOTE — PROGRESS NOTES
[facility] Medicine Services  INPATIENT PROGRESS NOTE    Length of Stay: 11  Date of Admission: 5/17/2023  Primary Care Physician: Dolly Foss APRN    Subjective   Chief Complaint: Right lower extremity pain and erythema, fever/chills  HPI: Doing okay this morning.  Patient is extremely anxious to go.  She feels like she is not strong enough.    As of today, 05/28/23  Review of Systems   Constitutional: Negative for appetite change, chills, fatigue, fever and unexpected weight change.   Respiratory: Negative for cough, choking, chest tightness, shortness of breath and wheezing.    Cardiovascular: Negative for chest pain, palpitations and leg swelling.   Gastrointestinal: Negative for abdominal pain, blood in stool, constipation, diarrhea, nausea and vomiting.   Genitourinary: Positive for difficulty urinating. Negative for dysuria, flank pain and hematuria.   Skin: Positive for color change.   Neurological: Negative for dizziness, seizures, syncope, speech difficulty, weakness, light-headedness, numbness and headaches.   Hematological: Does not bruise/bleed easily.        All pertinent negatives and positives are as above. All other systems have been reviewed and are negative unless otherwise stated.    Objective    Temp:  [97.3 °F (36.3 °C)-98.4 °F (36.9 °C)] 97.8 °F (36.6 °C)  Heart Rate:  [64-86] 79  Resp:  [18-20] 20  BP: (107-149)/(51-72) 139/64    AM-PAC 6 Clicks Score (PT): 12 (05/28/23 0800)    As of today, 05/28/23  Physical Exam  Vitals reviewed.   Constitutional:       Appearance: She is well-developed.   HENT:      Head: Normocephalic and atraumatic.   Eyes:      Pupils: Pupils are equal, round, and reactive to light.   Cardiovascular:      Rate and Rhythm: Normal rate and regular rhythm.      Heart sounds: Normal heart sounds. No murmur heard.    No friction rub. No gallop.   Pulmonary:      Effort: Pulmonary effort is normal. No respiratory  distress.      Breath sounds: Normal breath sounds. No wheezing or rales.   Chest:      Chest wall: No tenderness.   Abdominal:      General: Bowel sounds are normal. There is no distension.      Palpations: Abdomen is soft.      Tenderness: There is no abdominal tenderness. There is no guarding.   Musculoskeletal:      Cervical back: Normal range of motion and neck supple.      Comments: Left BKA   Skin:     General: Skin is warm and dry.      Comments: Right lower extremity erythema improving.  Blister on dorsum of right foot.  Ruptured blister on medial aspect of right distal lower extremity.   Psychiatric:         Behavior: Behavior normal.         Thought Content: Thought content normal.       Results Review:  I have reviewed the labs, radiology results, and diagnostic studies.    Laboratory Data:   Results from last 7 days   Lab Units 05/28/23  0654 05/24/23  0511 05/23/23 2212 05/23/23  1706 05/23/23  0552   SODIUM mmol/L 136 138  --   --  139   POTASSIUM mmol/L 4.0 4.3 3.8   < > 3.4*   CHLORIDE mmol/L 98 95*  --   --  96*   CO2 mmol/L 28.0 31.0*  --   --  34.0*   BUN mg/dL 12 10  --   --  10   CREATININE mg/dL 0.92 0.96  --   --  0.88   GLUCOSE mg/dL 295* 244*  --   --  156*   CALCIUM mg/dL 9.3 9.2  --   --  9.7   ANION GAP mmol/L 10.0 12.0  --   --  9.0    < > = values in this interval not displayed.     Estimated Creatinine Clearance: 88.7 mL/min (by C-G formula based on SCr of 0.92 mg/dL).  Results from last 7 days   Lab Units 05/23/23 2212 05/23/23  1706   MAGNESIUM mg/dL 1.7 1.6         Results from last 7 days   Lab Units 05/28/23  0653 05/27/23  0504 05/26/23  0543 05/25/23  0534 05/24/23  0511   WBC 10*3/mm3 11.87* 11.53* 12.86* 17.37* 13.22*   HEMOGLOBIN g/dL 11.6* 11.2* 11.4* 11.2* 12.0   HEMATOCRIT % 35.7 34.0 35.4 35.2 36.5   PLATELETS 10*3/mm3 488* 554* 568* 643* 491*           Culture Data:   No results found for: BLOODCX  No results found for: URINECX  No results found for: RESPCX  No  results found for: WOUNDCX  No results found for: STOOLCX  No components found for: BODYFLD    Radiology Data:   Imaging Results (Last 24 Hours)     ** No results found for the last 24 hours. **          I have utilized all available immediate resources to obtain, update, or review the patient's current medications (including all prescriptions, over-the-counter products, herbals, cannabis/cannabidiol products, and vitamin/mineral/dietary (nutritional) supplements).       Assessment/Plan     Active Hospital Problems    Diagnosis    • **Sepsis without acute organ dysfunction, due to unspecified organism    • Urinary retention        Plan:  1.  Right lower extremity cellulitis: Improving nicely.  Completed antibiotic course.  Wound care following.  2.  Urinary retention: Appreciate urology help.  Urinary retention is resolved   3.  Hypertension: Well-controlled.  4.  Coronary artery disease: Continue home medication.  5.  Diabetes mellitus: Glucose still not optimally controlled.  Will increase a.m. Levemir again.  6.  Deconditioning: Continue PT/OT.  Patient with extreme anxiety about going home.  May need rehab prior to discharge.  7.  DVT prophylaxis: Heparin.      Medical Decision Making  Number and Complexity of problems: Several highly complex medical problems    Conditions and Status:        Condition is improving.       Discussed with: Patient and      Treatment Plan  As above    Care Planning  Shared decision making: Patient in agreement with plan of care  Code status and discussions: Full code    Disposition  Social Determinants of Health that impact treatment or disposition: None  I expect the patient to be discharged to home in 1-2 days.       The patient was evaluated during the global COVID-19 pandemic, and the diagnosis was suspected/considered upon their initial presentation.  Evaluation, treatment, and testing were consistent with current guidelines for patients who present with complaints or  symptoms that may be related to COVID-19.    I confirmed that the patient's Advance Care Plan is present, code status is documented, or surrogate decision maker is listed in the patient's medical record.        This document has been electronically signed by Mahin Esteves MD on May 28, 2023 13:46 CDT

## 2023-05-28 NOTE — PLAN OF CARE
Goal Outcome Evaluation:  Plan of Care Reviewed With: (P) patient        Progress: (P) improving  Outcome Evaluation: (P) Pt alert and oriented X4. VSS. Blood sugar maintained with SSI/Levemir SQ. IVF d/c. Dr Doe signed off on patient. Dr Esteves seen patient and would like rehab to evaluate.

## 2023-05-28 NOTE — PROGRESS NOTES
LOS: 11 days     Patient Care Team:  Dolly Foss APRN as PCP - General (Family Medicine)  Uziel Alonso MD as Consulting Physician (Hematology and Oncology)  Elvis Gamboa APRN as Nurse Practitioner (Endocrinology)  Teressa Hammond APRN as Nurse Practitioner (Oncology)      Subjective     Resolved urinary retention.  Nonfunctional InterStim from battery run down    Objective Voiding approximately 500 cc each time      Vital Signs  Temp:  [97.3 °F (36.3 °C)-98.4 °F (36.9 °C)] 98.4 °F (36.9 °C)  Heart Rate:  [64-86] 86  Resp:  [18] 18  BP: (107-149)/(50-72) 149/72    Physical Exam:        General Appearance:   No acute distress     Respiratory:    UNLABORED RESPIRATIONS.     Abdomen:     SOFT.       Genitourinary:  Voiding well     Rectal:     DEFERRED       Results Review:       Imaging Results (Last 24 Hours)     ** No results found for the last 24 hours. **        Lab Results (last 24 hours)     Procedure Component Value Units Date/Time    CBC & Differential [[id number]]  (Abnormal) Collected: 05/28/23 0653    Specimen: Blood Updated: 05/28/23 0719    Narrative:      The following orders were created for panel order CBC & Differential.  Procedure                               Abnormality         Status                     ---------                               -----------         ------                     CBC Auto Differential[[id number]]        Abnormal            Final result                 Please view results for these tests on the individual orders.    CBC Auto Differential [[id number]]  (Abnormal) Collected: 05/28/23 0653    Specimen: Blood Updated: 05/28/23 0719     WBC 11.87 10*3/mm3      RBC 4.04 10*6/mm3      Hemoglobin 11.6 g/dL      Hematocrit 35.7 %      MCV 88.4 fL      MCH 28.7 pg      MCHC 32.5 g/dL      RDW 13.3 %      RDW-SD 42.9 fl      MPV 9.1 fL      Platelets 488 10*3/mm3      Neutrophil % 74.7 %      Lymphocyte % 15.8 %      Monocyte % 6.0 %      Eosinophil % 2.0 %       Basophil % 0.6 %      Immature Grans % 0.9 %      Neutrophils, Absolute 8.87 10*3/mm3      Lymphocytes, Absolute 1.87 10*3/mm3      Monocytes, Absolute 0.71 10*3/mm3      Eosinophils, Absolute 0.24 10*3/mm3      Basophils, Absolute 0.07 10*3/mm3      Immature Grans, Absolute 0.11 10*3/mm3      nRBC 0.0 /100 WBC     Basic Metabolic Panel [[id number]] Collected: 05/28/23 0654    Specimen: Blood Updated: 05/28/23 0714    POC Glucose Once [[id number]]  (Abnormal) Collected: 05/27/23 1944    Specimen: Blood Updated: 05/27/23 2005     Glucose 332 mg/dL      Comment: RN NotifiedOperator: [id number] OLGA Gutierrez ID: [id number]       POC Glucose Once [[id number]]  (Abnormal) Collected: 05/27/23 1643    Specimen: Blood Updated: 05/27/23 1655     Glucose 294 mg/dL      Comment: RN NotifiedOperator: [id number] ANAMARIA Gee ID: [id number]       POC Glucose Once [[id number]]  (Abnormal) Collected: 05/27/23 1042    Specimen: Blood Updated: 05/27/23 1130     Glucose 283 mg/dL      Comment: RN NotifiedOperator: [id number] ANAMARIA LOPEZMetpastora ID: [id number]       POC Glucose Once [[id number]]  (Abnormal) Collected: 05/27/23 0810    Specimen: Blood Updated: 05/27/23 0823     Glucose 225 mg/dL      Comment: RN NotifiedOperator: [id number] ANAMARIA LOPEZMetpastora ID: [id number]               I reviewed the patient's new clinical results.  I reviewed the patient's new imaging results and agree with the interpretation.  I reviewed the patient's other test results and agree with the interpretation        Assessment:    Urinary retention resolved    Plan:     Home from my standpoint office 1 to 2 weeks to plan for change of InterStim battery      Rohna Doe MD  05/28/23  07:39 CDT

## 2023-05-29 LAB
BASOPHILS # BLD AUTO: 0.08 10*3/MM3 (ref 0–0.2)
BASOPHILS NFR BLD AUTO: 0.9 % (ref 0–1.5)
DEPRECATED RDW RBC AUTO: 42.5 FL (ref 37–54)
EOSINOPHIL # BLD AUTO: 0.23 10*3/MM3 (ref 0–0.4)
EOSINOPHIL NFR BLD AUTO: 2.6 % (ref 0.3–6.2)
ERYTHROCYTE [DISTWIDTH] IN BLOOD BY AUTOMATED COUNT: 13.4 % (ref 12.3–15.4)
GLUCOSE BLDC GLUCOMTR-MCNC: 278 MG/DL (ref 70–130)
GLUCOSE BLDC GLUCOMTR-MCNC: 280 MG/DL (ref 70–130)
GLUCOSE BLDC GLUCOMTR-MCNC: 315 MG/DL (ref 70–130)
GLUCOSE BLDC GLUCOMTR-MCNC: 348 MG/DL (ref 70–130)
HCT VFR BLD AUTO: 35 % (ref 34–46.6)
HGB BLD-MCNC: 11.4 G/DL (ref 12–15.9)
HOLD SPECIMEN: NORMAL
IMM GRANULOCYTES # BLD AUTO: 0.05 10*3/MM3 (ref 0–0.05)
IMM GRANULOCYTES NFR BLD AUTO: 0.6 % (ref 0–0.5)
LYMPHOCYTES # BLD AUTO: 2.45 10*3/MM3 (ref 0.7–3.1)
LYMPHOCYTES NFR BLD AUTO: 27.7 % (ref 19.6–45.3)
MCH RBC QN AUTO: 28.4 PG (ref 26.6–33)
MCHC RBC AUTO-ENTMCNC: 32.6 G/DL (ref 31.5–35.7)
MCV RBC AUTO: 87.3 FL (ref 79–97)
MONOCYTES # BLD AUTO: 0.64 10*3/MM3 (ref 0.1–0.9)
MONOCYTES NFR BLD AUTO: 7.2 % (ref 5–12)
NEUTROPHILS NFR BLD AUTO: 5.41 10*3/MM3 (ref 1.7–7)
NEUTROPHILS NFR BLD AUTO: 61 % (ref 42.7–76)
NRBC BLD AUTO-RTO: 0 /100 WBC (ref 0–0.2)
PLATELET # BLD AUTO: 525 10*3/MM3 (ref 140–450)
PMV BLD AUTO: 8.8 FL (ref 6–12)
RBC # BLD AUTO: 4.01 10*6/MM3 (ref 3.77–5.28)
WBC NRBC COR # BLD: 8.86 10*3/MM3 (ref 3.4–10.8)

## 2023-05-29 PROCEDURE — 63710000001 ONDANSETRON PER 8 MG: Performed by: UROLOGY

## 2023-05-29 PROCEDURE — 25010000002 HEPARIN (PORCINE) PER 1000 UNITS: Performed by: UROLOGY

## 2023-05-29 PROCEDURE — 97110 THERAPEUTIC EXERCISES: CPT

## 2023-05-29 PROCEDURE — 85025 COMPLETE CBC W/AUTO DIFF WBC: CPT | Performed by: HOSPITALIST

## 2023-05-29 PROCEDURE — 63710000001 INSULIN DETEMIR PER 5 UNITS: Performed by: UROLOGY

## 2023-05-29 PROCEDURE — 97535 SELF CARE MNGMENT TRAINING: CPT

## 2023-05-29 PROCEDURE — 63710000001 INSULIN ASPART PER 5 UNITS: Performed by: UROLOGY

## 2023-05-29 PROCEDURE — 82948 REAGENT STRIP/BLOOD GLUCOSE: CPT

## 2023-05-29 PROCEDURE — 63710000001 INSULIN DETEMIR PER 5 UNITS: Performed by: HOSPITALIST

## 2023-05-29 PROCEDURE — 97530 THERAPEUTIC ACTIVITIES: CPT

## 2023-05-29 RX ORDER — HYDROCODONE BITARTRATE AND ACETAMINOPHEN 10; 325 MG/1; MG/1
1 TABLET ORAL EVERY 6 HOURS PRN
Status: DISCONTINUED | OUTPATIENT
Start: 2023-05-29 | End: 2023-06-05

## 2023-05-29 RX ADMIN — Medication 20 ML: at 09:47

## 2023-05-29 RX ADMIN — RANOLAZINE 500 MG: 500 TABLET, FILM COATED, EXTENDED RELEASE ORAL at 08:38

## 2023-05-29 RX ADMIN — VENLAFAXINE HYDROCHLORIDE 75 MG: 75 CAPSULE, EXTENDED RELEASE ORAL at 08:37

## 2023-05-29 RX ADMIN — TAMSULOSIN HYDROCHLORIDE 0.4 MG: 0.4 CAPSULE ORAL at 08:37

## 2023-05-29 RX ADMIN — INSULIN DETEMIR 45 UNITS: 100 INJECTION, SOLUTION SUBCUTANEOUS at 20:30

## 2023-05-29 RX ADMIN — CLOPIDOGREL BISULFATE 75 MG: 75 TABLET ORAL at 09:18

## 2023-05-29 RX ADMIN — RANOLAZINE 500 MG: 500 TABLET, FILM COATED, EXTENDED RELEASE ORAL at 20:29

## 2023-05-29 RX ADMIN — FUROSEMIDE 40 MG: 40 TABLET ORAL at 08:38

## 2023-05-29 RX ADMIN — AMLODIPINE BESYLATE 5 MG: 5 TABLET ORAL at 08:37

## 2023-05-29 RX ADMIN — Medication 1 APPLICATION: at 09:17

## 2023-05-29 RX ADMIN — ONDANSETRON HYDROCHLORIDE 4 MG: 4 TABLET, FILM COATED ORAL at 00:28

## 2023-05-29 RX ADMIN — ISOSORBIDE MONONITRATE 30 MG: 30 TABLET, EXTENDED RELEASE ORAL at 08:39

## 2023-05-29 RX ADMIN — METOCLOPRAMIDE 10 MG: 10 TABLET ORAL at 20:29

## 2023-05-29 RX ADMIN — SUCRALFATE 1 G: 1 TABLET ORAL at 08:37

## 2023-05-29 RX ADMIN — FOLIC ACID 1000 MCG: 1 TABLET ORAL at 08:38

## 2023-05-29 RX ADMIN — ARIPIPRAZOLE 5 MG: 5 TABLET ORAL at 08:42

## 2023-05-29 RX ADMIN — ASPIRIN 325 MG: 325 TABLET, COATED ORAL at 08:39

## 2023-05-29 RX ADMIN — ONDANSETRON HYDROCHLORIDE 4 MG: 4 TABLET, FILM COATED ORAL at 18:42

## 2023-05-29 RX ADMIN — Medication 1 APPLICATION: at 21:25

## 2023-05-29 RX ADMIN — SUCRALFATE 1 G: 1 TABLET ORAL at 20:29

## 2023-05-29 RX ADMIN — HEPARIN SODIUM 5000 UNITS: 5000 INJECTION INTRAVENOUS; SUBCUTANEOUS at 21:24

## 2023-05-29 RX ADMIN — Medication 10 ML: at 21:25

## 2023-05-29 RX ADMIN — INSULIN DETEMIR 30 UNITS: 100 INJECTION, SOLUTION SUBCUTANEOUS at 08:42

## 2023-05-29 RX ADMIN — HYDROCHLOROTHIAZIDE 12.5 MG: 12.5 TABLET ORAL at 08:38

## 2023-05-29 RX ADMIN — PANTOPRAZOLE SODIUM 40 MG: 40 TABLET, DELAYED RELEASE ORAL at 08:37

## 2023-05-29 RX ADMIN — INSULIN ASPART 4 UNITS: 100 INJECTION, SOLUTION INTRAVENOUS; SUBCUTANEOUS at 08:44

## 2023-05-29 RX ADMIN — HEPARIN SODIUM 5000 UNITS: 5000 INJECTION INTRAVENOUS; SUBCUTANEOUS at 14:18

## 2023-05-29 RX ADMIN — LEVOTHYROXINE SODIUM 50 MCG: 50 TABLET ORAL at 05:02

## 2023-05-29 RX ADMIN — INSULIN ASPART 10 UNITS: 100 INJECTION, SOLUTION INTRAVENOUS; SUBCUTANEOUS at 11:35

## 2023-05-29 RX ADMIN — ATORVASTATIN CALCIUM 80 MG: 40 TABLET, FILM COATED ORAL at 08:37

## 2023-05-29 RX ADMIN — HEPARIN SODIUM 5000 UNITS: 5000 INJECTION INTRAVENOUS; SUBCUTANEOUS at 05:02

## 2023-05-29 RX ADMIN — Medication 10 ML: at 20:30

## 2023-05-29 RX ADMIN — GABAPENTIN 200 MG: 100 CAPSULE ORAL at 20:29

## 2023-05-29 RX ADMIN — SUCRALFATE 1 G: 1 TABLET ORAL at 17:52

## 2023-05-29 RX ADMIN — SUCRALFATE 1 G: 1 TABLET ORAL at 11:54

## 2023-05-29 RX ADMIN — INSULIN ASPART 8 UNITS: 100 INJECTION, SOLUTION INTRAVENOUS; SUBCUTANEOUS at 17:52

## 2023-05-29 RX ADMIN — GABAPENTIN 200 MG: 100 CAPSULE ORAL at 08:37

## 2023-05-29 RX ADMIN — HYDROCODONE BITARTRATE AND ACETAMINOPHEN 1 TABLET: 10; 325 TABLET ORAL at 11:37

## 2023-05-29 RX ADMIN — GUAIFENESIN AND DEXTROMETHORPHAN 5 ML: 100; 10 SYRUP ORAL at 08:38

## 2023-05-29 NOTE — PLAN OF CARE
Goal Outcome Evaluation:  Plan of Care Reviewed With: patient, spouse           Outcome Evaluation: OT re-cert complete, patient motivated in therapy and reports possibly going to ARU. Supine to sit with modified independence. EOB, 3 sets x 10 reps of BUE exercises, 3 lbs dowel performed. Sit to stand, stand pivot bed to BSC, stand pivot from BSC to recliener with CGA. LE dressing with CGA, toileting mod A, grooming with set up. Up in recliner, all needs inr each. Patient is a good rehab candidate. Goals updated this date, as patient has achieved several OT goals, patient continues to progress.

## 2023-05-29 NOTE — PROGRESS NOTES
[facility]  INPATIENT PROGRESS NOTE    Length of Stay: 12  Date of Admission: 5/17/2023  Primary Care Physician: Dolly Foss APRN    Subjective   Chief Complaint: Right lower extremity pain and erythema, fever/chills  HPI: Sitting in chair after working with therapy.  Feels like she is doing some better.    As of today, 05/29/23  Review of Systems   Constitutional: Negative for appetite change, chills, fatigue, fever and unexpected weight change.   Respiratory: Negative for cough, choking, chest tightness, shortness of breath and wheezing.    Cardiovascular: Negative for chest pain, palpitations and leg swelling.   Gastrointestinal: Negative for abdominal pain, blood in stool, constipation, diarrhea, nausea and vomiting.   Genitourinary: Positive for difficulty urinating. Negative for dysuria, flank pain and hematuria.   Skin: Positive for color change.   Neurological: Negative for dizziness, seizures, syncope, speech difficulty, weakness, light-headedness, numbness and headaches.   Hematological: Does not bruise/bleed easily.        All pertinent negatives and positives are as above. All other systems have been reviewed and are negative unless otherwise stated.    Objective    Temp:  [96.9 °F (36.1 °C)-97.8 °F (36.6 °C)] 97.3 °F (36.3 °C)  Heart Rate:  [70-81] 76  Resp:  [18-20] 18  BP: (106-134)/(52-60) 127/58    AM-PAC 6 Clicks Score (PT): 12 (05/28/23 2037)    As of today, 05/29/23  Physical Exam  Vitals reviewed.   Constitutional:       Appearance: She is well-developed.   HENT:      Head: Normocephalic and atraumatic.   Eyes:      Pupils: Pupils are equal, round, and reactive to light.   Cardiovascular:      Rate and Rhythm: Normal rate and regular rhythm.      Heart sounds: Normal heart sounds. No murmur heard.    No friction rub. No gallop.   Pulmonary:      Effort: Pulmonary effort is normal. No respiratory distress.      Breath  sounds: Normal breath sounds. No wheezing or rales.   Chest:      Chest wall: No tenderness.   Abdominal:      General: Bowel sounds are normal. There is no distension.      Palpations: Abdomen is soft.      Tenderness: There is no abdominal tenderness. There is no guarding.   Musculoskeletal:      Cervical back: Normal range of motion and neck supple.      Comments: Left BKA   Skin:     General: Skin is warm and dry.      Comments: Right lower extremity erythema improving.  Blister on dorsum of right foot.  Ruptured blister on medial aspect of right distal lower extremity.   Psychiatric:         Behavior: Behavior normal.         Thought Content: Thought content normal.       Results Review:  I have reviewed the labs, radiology results, and diagnostic studies.    Laboratory Data:   Results from last 7 days   Lab Units 05/28/23  0654 05/24/23  0511 05/23/23 2212 05/23/23  1706 05/23/23  0552   SODIUM mmol/L 136 138  --   --  139   POTASSIUM mmol/L 4.0 4.3 3.8   < > 3.4*   CHLORIDE mmol/L 98 95*  --   --  96*   CO2 mmol/L 28.0 31.0*  --   --  34.0*   BUN mg/dL 12 10  --   --  10   CREATININE mg/dL 0.92 0.96  --   --  0.88   GLUCOSE mg/dL 295* 244*  --   --  156*   CALCIUM mg/dL 9.3 9.2  --   --  9.7   ANION GAP mmol/L 10.0 12.0  --   --  9.0    < > = values in this interval not displayed.     Estimated Creatinine Clearance: 88.3 mL/min (by C-G formula based on SCr of 0.92 mg/dL).  Results from last 7 days   Lab Units 05/23/23 2212 05/23/23  1706   MAGNESIUM mg/dL 1.7 1.6         Results from last 7 days   Lab Units 05/29/23  0525 05/28/23  0653 05/27/23  0504 05/26/23  0543 05/25/23  0534   WBC 10*3/mm3 8.86 11.87* 11.53* 12.86* 17.37*   HEMOGLOBIN g/dL 11.4* 11.6* 11.2* 11.4* 11.2*   HEMATOCRIT % 35.0 35.7 34.0 35.4 35.2   PLATELETS 10*3/mm3 525* 488* 554* 568* 643*           Culture Data:   No results found for: BLOODCX  No results found for: URINECX  No results found for: RESPCX  No results found for:  WOUNDCX  No results found for: STOOLCX  No components found for: BODYFLD    Radiology Data:   Imaging Results (Last 24 Hours)     ** No results found for the last 24 hours. **          I have utilized all available immediate resources to obtain, update, or review the patient's current medications (including all prescriptions, over-the-counter products, herbals, cannabis/cannabidiol products, and vitamin/mineral/dietary (nutritional) supplements).       Assessment/Plan     Active Hospital Problems    Diagnosis    • **Sepsis without acute organ dysfunction, due to unspecified organism    • Urinary retention        Plan:  1.  Right lower extremity cellulitis: Improving nicely.  Completed antibiotic course.  Wound care following.  2.  Urinary retention: Appreciate urology help.  Urinary retention is resolved   3.  Hypertension: Well-controlled.  4.  Coronary artery disease: Continue home medication.  5.  Diabetes mellitus: Glucose still not optimally controlled.  Levemir increased yesterday.  Monitor.  Some improvement.    6.  Deconditioning: Continue PT/OT.  Patient with extreme anxiety about going home.  Still feels like she needs rehab prior to going home.  7.  DVT prophylaxis: Heparin.      Medical Decision Making  Number and Complexity of problems: Several highly complex medical problems    Conditions and Status:        Condition is improving.       Discussed with: Patient and      Treatment Plan  As above    Care Planning  Shared decision making: Patient in agreement with plan of care  Code status and discussions: Full code    Disposition  Social Determinants of Health that impact treatment or disposition: None  I expect the patient to be discharged to home in 1-2 days.       The patient was evaluated during the global COVID-19 pandemic, and the diagnosis was suspected/considered upon their initial presentation.  Evaluation, treatment, and testing were consistent with current guidelines for patients who  present with complaints or symptoms that may be related to COVID-19.    I confirmed that the patient's Advance Care Plan is present, code status is documented, or surrogate decision maker is listed in the patient's medical record.        This document has been electronically signed by Mahin Esteves MD on May 29, 2023 10:43 CDT

## 2023-05-29 NOTE — THERAPY TREATMENT NOTE
Acute Care - Physical Therapy Treatment Note  [facility]     Patient Name: Ariana Martinez  : 1960  MRN: [id number]  Today's Date: 2023      Visit Dx:     ICD-10-CM ICD-9-CM   1. Sepsis without acute organ dysfunction, due to unspecified organism  A41.9 038.9     995.91   2. Cellulitis of right lower extremity  L03.115 682.6   3. Type 2 diabetes mellitus with hyperglycemia, unspecified whether long term insulin use  E11.65 250.00   4. Impaired mobility and activities of daily living  Z74.09 V49.89    Z78.9    5. Impaired physical mobility  Z74.09 781.99   6. Impaired mobility and ADLs  Z74.09 V49.89    Z78.9    7. Urinary retention  R33.9 788.20     Patient Active Problem List   Diagnosis   • 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, with long-term current use of insulin   • Closed nondisplaced fracture of fifth left metatarsal bone   • MAURICIO (generalized anxiety disorder)   • Depression, major, recurrent, moderate ([facility])   • GERD without esophagitis   • Long term prescription opiate use   • Mixed hyperlipidemia   • Vitamin D deficiency   • Seasonal allergic rhinitis   • Restrictive lung disease secondary to obesity   • Adult body mass index 37.0-37.9   • Snoring   • Class 3 severe obesity due to excess calories without serious comorbidity with body mass index (BMI) of 40.0 to 44.9 in adult ([facility])   • (HFpEF) heart failure with preserved ejection fraction   • Type 2 diabetes mellitus with hyperglycemia, with long-term current use of insulin ([facility])   • Cyanocobalamin deficiency   • Coronary artery disease of native artery of native heart with stable angina pectoris   • Hypertension   • Meniere's disease   • Gastroparesis   • Pulmonary hypertension ([facility])   • Pes cavus   • Primary osteoarthritis involving multiple joints   • Generalized anxiety disorder   • Chronic right-sided low back pain with right-sided sciatica   • Chest pain   • Adverse effect of iron   • Chest pain due to  myocardial ischemia   • Nonrheumatic tricuspid valve regurgitation   • Iron deficiency anemia due to chronic blood loss   • Malaise and fatigue   • Ankle arthritis   • Urinary retention   • Endocarditis   • Essential hypertension   • Occlusion and stenosis of bilateral carotid arteries   • S/P BKA (below knee amputation) unilateral, left (HCC)   • Phantom pain after amputation of lower extremity   • S/P CABG (coronary artery bypass graft)   • Severe malnutrition   • TIA (transient ischemic attack)   • Coronary artery abnormality   • Elevated d-dimer   • Neuropathy   • Chest pain, unspecified type   • Acute pain of right shoulder   • Rotator cuff syndrome, right   • Acquired hypothyroidism   • Bilateral leg edema   • Left elbow pain   • Gastritis   • Olecranon bursitis, left elbow   • Sepsis without acute organ dysfunction, due to unspecified organism     Past Medical History:   Diagnosis Date   • Acute bacterial endocarditis 3/13/2021   • Angina, class IV    • Anxiety    • Anxiety and depression    • Arthritis    • Benign paroxysmal positional vertigo    • Bladder disorder     has bladder stimulator   • Carpal tunnel syndrome    • CHF (congestive heart failure)    • Chronic pain    • Coronary atherosclerosis     hx CABG 2005.  is followed by Dr Kwon   • Depression    • Diabetes mellitus     Type 2, controlled   • Diabetic polyneuropathy    • Disease of thyroid gland    • Elevated cholesterol    • Female stress incontinence    • Foot pain, left    • Full dentures    • Gastroparesis    • GERD (gastroesophageal reflux disease)    • Hyperlipidemia    • Hypertension    • Low back pain    • Malaise and fatigue    • Multiple joint pain    • Obesity     Refuses to be weighed   • Occlusion and stenosis of bilateral carotid arteries 6/18/2021   • Otalgia     Both   • Perforation of tympanic membrane     Left   • Postoperative wound infection    • Vitamin D deficiency    • Wears glasses     reading     Past Surgical  History:   Procedure Laterality Date   • ABDOMINAL SURGERY     • AMPUTATION FOOT     • ANGIOPLASTY      coronary   • ANKLE ARTHROSCOPY Left 02/26/2021    Procedure: Left foot hardware removal, ankle arthroscopy, bone grafting , left foot exostectomy;  Surgeon: Ignacio Lord DPM;  Location: [facility] OR;  Service: Podiatry;  Laterality: Left;   • BREAST BIOPSY Right    • CARDIAC CATHETERIZATION     • CARDIAC CATHETERIZATION N/A 06/20/2017    Procedure: Right Heart Cath;  Surgeon: Can Kwon MD PhD;  Location: [facility] CATH INVASIVE LOCATION;  Service:    • CARDIAC CATHETERIZATION N/A 02/18/2020    Procedure: Left Heart Cath;  Surgeon: Catalina Cooper MD;  Location: [facility] CATH INVASIVE LOCATION;  Service: Cardiology;  Laterality: N/A;   • CARDIAC CATHETERIZATION N/A 04/06/2022    Procedure: Left Heart Cath;  Surgeon: Sheryl Navas MD;  Location: [facility] CATH INVASIVE LOCATION;  Service: Cardiology;  Laterality: N/A;   • CARPAL TUNNEL RELEASE     • CHOLECYSTECTOMY     • COLONOSCOPY N/A 06/24/2020    Procedure: COLONOSCOPY;  Surgeon: Julián Maldonado MD;  Location: [facility] ENDOSCOPY;  Service: Gastroenterology;  Laterality: N/A;   • CORONARY ARTERY BYPASS GRAFT  2005   • ENDOSCOPY N/A 10/19/2018    Procedure: ESOPHAGOGASTRODUODENOSCOPY possible dilation;  Surgeon: Julián Maldonado MD;  Location: [facility] ENDOSCOPY;  Service: Gastroenterology   • ENDOSCOPY N/A 06/24/2020    Procedure: ESOPHAGOGASTRODUODENOSCOPY WED appt please;  Surgeon: Julián Maldonado MD;  Location: [facility] ENDOSCOPY;  Service: Gastroenterology;  Laterality: N/A;   • ENDOSCOPY N/A 06/10/2022    Procedure: ESOPHAGOGASTRODUODENOSCOPY   room 380;  Surgeon: Jeremiah Wilkins MD;  Location: [facility] ENDOSCOPY;  Service: Gastroenterology;  Laterality: N/A;   • ENDOSCOPY N/A 1/10/2023    Procedure: ESOPHAGOGASTRODUODENOSCOPY;  Surgeon: Jeremiah Wilkins MD;  Location: [facility] ENDOSCOPY;  Service: Gastroenterology;  Laterality: N/A;   •  ENDOSCOPY AND COLONOSCOPY     • FOOT SURGERY      Toes   • FOOT SURGERY     • GASTRIC BANDING      Revision, laparoscopic   • HYSTERECTOMY     • INCISION AND DRAINAGE LEG Left 03/12/2021    Procedure: Left ankle arthroscopic irrigation and debridement, screw removal;  Surgeon: Ignacio Lord DPM;  Location: [facility];  Service: Podiatry;  Laterality: Left;   • MOUTH SURGERY     • SALPINGO OOPHORECTOMY     • SHOULDER SURGERY     • SUBTALAR ARTHRODESIS Left 01/16/2019    Procedure: LEFT FOOT HARDWARE REMOVAL, FIFTH METATARSAL , OPEN REDUCTION INTERNAL FIXATION, CALCANEAL OSTEOTOMY;  Surgeon: Ignacio Lord DPM;  Location: [facility];  Service: Podiatry   • SUBTALAR ARTHRODESIS Left 10/16/2019    Procedure: foot hardware removal, subtalar joint fusion  possible de/reattachment of achilles tendon        (c-arm);  Surgeon: Ignacio Lord DPM;  Location: [facility];  Service: Podiatry   • SUBTALAR ARTHRODESIS Left 09/30/2020    Procedure: subtalar, talonavicular joint arthrodesis.  Removal hardware.          (c-arm);  Surgeon: Ignacio Lord DPM;  Location: [facility];  Service: Podiatry;  Laterality: Left;   • TRANSESOPHAGEAL ECHOCARDIOGRAM (LAMONTE)      With color flow     PT Assessment (last 12 hours)     PT Evaluation and Treatment     Row Name 05/29/23 1054          Physical Therapy Time and Intention    Document Type therapy note (daily note)  -     Mode of Treatment individual therapy;physical therapy  -AME     Patient Effort good  -     Comment --  -AME     Row Name 05/29/23 1054          Pain    Pretreatment Pain Rating 7/10  -AME     Posttreatment Pain Rating 7/10  -     Pain Location - Side/Orientation Right  -     Pain Location lower  -     Pain Location - extremity  -     Pain Intervention(s) Repositioned  -     Row Name 05/29/23 1054          Cognition    Affect/Mental Status (Cognition) WFL  -     Orientation Status (Cognition) oriented x 4  -AME     Follows Commands (Cognition)  WNL  -     Personal Safety Interventions fall prevention program maintained;gait belt;muscle strengthening facilitated;nonskid shoes/slippers when out of bed;supervised activity  -     Row Name 05/29/23 1054          Bed Mobility    Bed Mobility sit-supine;scooting/bridging;rolling left;rolling right  -     Rolling Left [place] (Bed Mobility) standby assist  -AEM     Rolling Right [place] (Bed Mobility) standby assist  -     Scooting/Bridging [place] (Bed Mobility) standby assist  -     Sit-Supine [place] (Bed Mobility) standby assist  -     Assistive Device (Bed Mobility) bed rails;head of bed elevated  -     Row Name 05/29/23 1054          Transfers    Transfers sit-stand transfer;stand-sit transfer;chair-bed transfer  -     Row Name 05/29/23 1054          Chair-Bed Transfer    Chair-Bed [place] (Transfers) minimum assist (75% patient effort)  -     Comment, (Chair-Bed Transfer) squat pivot t/f- not a traditional method. pt pivots opposite direction so that she can put her L knee on the bed.  -     Row Name 05/29/23 1054          Sit-Stand Transfer    Sit-Stand [place] (Transfers) minimum assist (75% patient effort)  -     Row Name 05/29/23 1054          Stand-Sit Transfer    Stand-Sit [place] (Transfers) minimum assist (75% patient effort)  -     Row Name 05/29/23 1054          Motor Skills    Therapeutic Exercise --  lateral flexion(elbow to pillow) 10x1 arabella, reaching beyond arms length. LAQ, hip flexion- 20x1 SLR, hip Ab/Ad in SL, hip extension in SL- in supine and SL- 20x1 arabella. bridging, cruncehes  -     Row Name             Wound 05/17/23 1828 Right anterior fifth toe    Wound - Properties Group Placement Date: 05/17/23  - Placement Time: 1828 - Side: Right  - Orientation: anterior  - Location: fifth toe  -JH    Retired Wound - Properties Group Placement Date: 05/17/23  - Placement Time: 1828 - Side: Right  - Orientation: anterior   -JH Location: fifth toe  -JH    Retired Wound - Properties Group Date first assessed: 05/17/23  -JH Time first assessed: 1828  -JH Side: Right  -JH Location: fifth toe  -JH    Row Name             Wound 05/23/23 1006 Right distal leg    Wound - Properties Group Placement Date: 05/23/23  -LH Placement Time: 1006  -LH Present on Hospital Admission: N  -LH Side: Right  -LH Orientation: distal  -LH Location: leg  -LH    Retired Wound - Properties Group Placement Date: 05/23/23  - Placement Time: 1006  -LH Present on Hospital Admission: N  -LH Side: Right  -LH Orientation: distal  -LH Location: leg  -LH    Retired Wound - Properties Group Date first assessed: 05/23/23  -LH Time first assessed: 1006  -LH Present on Hospital Admission: N  -LH Side: Right  -LH Location: leg  -LH    Row Name 05/29/23 1054          Vital Signs    Pre Systolic BP Rehab 145  -AME     Pre Treatment Diastolic BP 62  -AME     Post Systolic BP Rehab 127  -AME     Post Treatment Diastolic BP 56  -AME     Pretreatment Heart Rate (beats/min) 75  -AME     Posttreatment Heart Rate (beats/min) 78  -AME     Pre SpO2 (%) 97  -AME     O2 Delivery Pre Treatment room air  -AME     Post SpO2 (%) 95  -AME     O2 Delivery Post Treatment room air  -AME     Pre Patient Position Supine  -AME     Post Patient Position Supine  -     Row Name 05/29/23 1054          Positioning and Restraints    Pre-Treatment Position sitting in chair/recliner  -AME     Post Treatment Position bed  -AME     In Bed fowlers;call light within reach;encouraged to call for assist;exit alarm on;patient within staff view;with family/caregiver  -     Row Name 05/29/23 1054          Bed Mobility Goal 1 (PT)    Activity/Assistive Device (Bed Mobility Goal 1, PT) bed mobility activities, all  -AME     [place] Level/Cues Needed (Bed Mobility Goal 1, PT) standby assist;independent  -     Progress/Outcomes (Bed Mobility Goal 1, PT) goal met  -     Row Name 05/29/23 1053          Transfer Goal 1  (PT)    Activity/Assistive Device (Transfer Goal 1, PT) sit-to-stand/stand-to-sit;bed-to-chair/chair-to-bed  -AME     [place] Level/Cues Needed (Transfer Goal 1, PT) contact guard required;standby assist;modified independence  -AME     Time Frame (Transfer Goal 1, PT) by discharge  -AME     Progress/Outcome (Transfer Goal 1, PT) goal not met  -AME     Row Name 05/29/23 1054          Gait Training Goal 1 (PT)    Activity/Assistive Device (Gait Training Goal 1, PT) gait (walking locomotion);decrease fall risk  -AME     [place] Level (Gait Training Goal 1, PT) contact guard required;standby assist;modified independence  -AME     Distance (Gait Training Goal 1, PT) 50ft or more each trip  -AME     Time Frame (Gait Training Goal 1, PT) 1 week  -AME     Progress/Outcome (Gait Training Goal 1, PT) goal not met  -AME     Row Name 05/29/23 1054          ROM Goal 1 (PT)    ROM Goal 1 (PT) Pt will tolerate LE exercises OOB in chair with VSS  -AME     Time Frame (ROM Goal 1, PT) by discharge  -AME     Progress/Outcome (ROM Goal 1, PT) goal not met  -AME           User Key  (r) = Recorded By, (t) = Taken By, (c) = Cosigned By    Initials Name Provider Type    Ousmane Gomes PTA Physical Therapist Assistant    Juany Sage, RN Registered Nurse    Janae Severino LPN Licensed Nurse                Physical Therapy Education     Title: PT OT SLP Therapies (In Progress)     Topic: Physical Therapy (In Progress)     Point: Mobility training (In Progress)     Learning Progress Summary           Patient Acceptance, E, NR by AME at 5/29/2023 1123    Acceptance, E, NR by AME at 5/26/2023 1356    Acceptance, E,TB, VU by LW at 5/25/2023 1459    Acceptance, E, NR by AME at 5/24/2023 1306    Acceptance, E,TB, VU by NB at 5/18/2023 2138    Acceptance, E, NR by JC1 at 5/18/2023 1407                   Point: Home exercise program (Done)     Learning Progress Summary           Patient Acceptance, E,TB, VU by KIMBERLY at 5/25/2023 3499                    Point: Body mechanics (Done)     Learning Progress Summary           Patient Acceptance, E,TB, VU by  at 5/25/2023 1459    Acceptance, E,TB, VU by  at 5/25/2023 1451                   Point: Precautions (Done)     Learning Progress Summary           Patient Acceptance, E,TB, VU by  at 5/25/2023 1459    Acceptance, E, NR by [facility] at 5/18/2023 1407                               User Key     Initials Effective Dates Name Provider Type Discipline    [facility] 06/16/21 -  Aure Villaseñor, PT Physical Therapist PT     06/16/21 -  Ousmane Zhang PTA Physical Therapist Assistant PT     06/16/21 -  Sarah Irby COTA Occupational Therapist Assistant OT    [facility] 06/16/21 -  Samantha Cuellar RN Registered Nurse Nurse     09/08/22 -  Janae Amaya LPN Licensed Nurse Nurse              PT Recommendation and Plan  Anticipated Discharge Disposition (PT): home with assist, home with home health, home with outpatient therapy services  Therapy Frequency (PT): other (see comments) (5-7 days/wk)  Plan of Care Reviewed With: patient  Progress: improving  Outcome Evaluation: pt agreeable to PT. pt completed non-traditional squat pivot t/f per pt request becuase it is how she feels the most comfortable- min A without AD. pt needs further transfer training for safety. pt participated in core and LE strengtheing while sitting EOB, supine, and side-lying. good effort by pt. no new goals met at this time. pt would continue to benefit from PT services.   Outcome Measures     Row Name 05/29/23 1054 05/26/23 1327          How much help from another person do you currently need...    Turning from your back to your side while in flat bed without using bedrails? 3  -AME 3  -AME     Moving from lying on back to sitting on the side of a flat bed without bedrails? 3  -AME 3  -AME     Moving to and from a bed to a chair (including a wheelchair)? 3  -AME 2  -AME     Standing up from a chair using your arms (e.g., wheelchair, bedside  chair)? 3  -AME 2  -AME     Climbing 3-5 steps with a railing? 1  -AME 1  -AME     To walk in hospital room? 1  -AME 1  -AME     AM-PAC 6 Clicks Score (PT) 14  -AME 12  -AME        Functional Assessment    Outcome Measure Options AM-PAC 6 Clicks Basic Mobility (PT)  -AME AM-PAC 6 Clicks Basic Mobility (PT)  -AME           User Key  (r) = Recorded By, (t) = Taken By, (c) = Cosigned By    Initials Name Provider Type    Ousmane Gomes PTA Physical Therapist Assistant                 Time Calculation:    PT Charges     Row Name 05/29/23 1134             Time Calculation    Start Time 1054  -AME      Stop Time 1132  -AME      Time Calculation (min) 38 min  -AME         Time Calculation- PT    Total Timed Code Minutes- PT 38 minute(s)  -AME         Timed Charges    77828 - PT Therapeutic Exercise Minutes 30  -AME      31521 - PT Therapeutic Activity Minutes 8  -AME         Total Minutes    Timed Charges Total Minutes 38  -AME       Total Minutes 38  -AME            User Key  (r) = Recorded By, (t) = Taken By, (c) = Cosigned By    Initials Name Provider Type    Ousmane Gomes PTA Physical Therapist Assistant              Therapy Charges for Today     Code Description Service Date Service Provider Modifiers Qty    00127581341 HC PT THER PROC EA 15 MIN 5/29/2023 Ousmane Zhang PTA GP 2    18329886030 HC PT THERAPEUTIC ACT EA 15 MIN 5/29/2023 Ousmane Zhang PTA GP 1          PT G-Codes  Outcome Measure Options: AM-PAC 6 Clicks Basic Mobility (PT)  AM-PAC 6 Clicks Score (PT): 14  AM-PAC 6 Clicks Score (OT): 20    Ousmane Zhang PTA  5/29/2023

## 2023-05-29 NOTE — PLAN OF CARE
Goal Outcome Evaluation:  Plan of Care Reviewed With: patient        Progress: improving       Glucose elevated treated w/insulin vss pt pivot from bsc to bed with minimal assist

## 2023-05-29 NOTE — PLAN OF CARE
Goal Outcome Evaluation:  Plan of Care Reviewed With: patient     Problem: Adult Inpatient Plan of Care  Goal: Plan of Care Review  Outcome: Ongoing, Progressing  Flowsheets (Taken 5/29/2023 1123)  Progress: improving  Plan of Care Reviewed With: patient  Outcome Evaluation: pt agreeable to PT. pt completed non-traditional squat pivot t/f per pt request becuase it is how she feels the most comfortable- min A without AD. pt needs further transfer training for safety. pt participated in core and LE strengtheing while sitting EOB, supine, and side-lying. good effort by pt. no new goals met at this time. pt would continue to benefit from PT services.

## 2023-05-30 LAB
BASOPHILS # BLD AUTO: 0.07 10*3/MM3 (ref 0–0.2)
BASOPHILS NFR BLD AUTO: 0.6 % (ref 0–1.5)
DEPRECATED RDW RBC AUTO: 43.7 FL (ref 37–54)
EOSINOPHIL # BLD AUTO: 0.28 10*3/MM3 (ref 0–0.4)
EOSINOPHIL NFR BLD AUTO: 2.5 % (ref 0.3–6.2)
ERYTHROCYTE [DISTWIDTH] IN BLOOD BY AUTOMATED COUNT: 13.8 % (ref 12.3–15.4)
GLUCOSE BLDC GLUCOMTR-MCNC: 200 MG/DL (ref 70–130)
GLUCOSE BLDC GLUCOMTR-MCNC: 257 MG/DL (ref 70–130)
GLUCOSE BLDC GLUCOMTR-MCNC: 263 MG/DL (ref 70–130)
GLUCOSE BLDC GLUCOMTR-MCNC: 269 MG/DL (ref 70–130)
HCT VFR BLD AUTO: 37.4 % (ref 34–46.6)
HGB BLD-MCNC: 12.1 G/DL (ref 12–15.9)
HOLD SPECIMEN: NORMAL
IMM GRANULOCYTES # BLD AUTO: 0.08 10*3/MM3 (ref 0–0.05)
IMM GRANULOCYTES NFR BLD AUTO: 0.7 % (ref 0–0.5)
LYMPHOCYTES # BLD AUTO: 2.21 10*3/MM3 (ref 0.7–3.1)
LYMPHOCYTES NFR BLD AUTO: 19.9 % (ref 19.6–45.3)
MCH RBC QN AUTO: 28.2 PG (ref 26.6–33)
MCHC RBC AUTO-ENTMCNC: 32.4 G/DL (ref 31.5–35.7)
MCV RBC AUTO: 87.2 FL (ref 79–97)
MONOCYTES # BLD AUTO: 0.65 10*3/MM3 (ref 0.1–0.9)
MONOCYTES NFR BLD AUTO: 5.9 % (ref 5–12)
NEUTROPHILS NFR BLD AUTO: 7.82 10*3/MM3 (ref 1.7–7)
NEUTROPHILS NFR BLD AUTO: 70.4 % (ref 42.7–76)
NRBC BLD AUTO-RTO: 0 /100 WBC (ref 0–0.2)
PLATELET # BLD AUTO: 520 10*3/MM3 (ref 140–450)
PMV BLD AUTO: 8.9 FL (ref 6–12)
RBC # BLD AUTO: 4.29 10*6/MM3 (ref 3.77–5.28)
WBC NRBC COR # BLD: 11.11 10*3/MM3 (ref 3.4–10.8)

## 2023-05-30 PROCEDURE — 63710000001 ONDANSETRON PER 8 MG: Performed by: UROLOGY

## 2023-05-30 PROCEDURE — 63710000001 INSULIN ASPART PER 5 UNITS: Performed by: UROLOGY

## 2023-05-30 PROCEDURE — 82948 REAGENT STRIP/BLOOD GLUCOSE: CPT

## 2023-05-30 PROCEDURE — 97535 SELF CARE MNGMENT TRAINING: CPT

## 2023-05-30 PROCEDURE — 85025 COMPLETE CBC W/AUTO DIFF WBC: CPT | Performed by: HOSPITALIST

## 2023-05-30 PROCEDURE — 63710000001 INSULIN DETEMIR PER 5 UNITS: Performed by: HOSPITALIST

## 2023-05-30 PROCEDURE — 25010000002 HEPARIN (PORCINE) PER 1000 UNITS: Performed by: UROLOGY

## 2023-05-30 PROCEDURE — 97530 THERAPEUTIC ACTIVITIES: CPT

## 2023-05-30 PROCEDURE — 25010000002 CYANOCOBALAMIN PER 1000 MCG: Performed by: HOSPITALIST

## 2023-05-30 PROCEDURE — 63710000001 INSULIN DETEMIR PER 5 UNITS: Performed by: UROLOGY

## 2023-05-30 RX ORDER — CYANOCOBALAMIN 1000 UG/ML
1000 INJECTION, SOLUTION INTRAMUSCULAR; SUBCUTANEOUS
Status: DISCONTINUED | OUTPATIENT
Start: 2023-05-30 | End: 2023-06-08 | Stop reason: HOSPADM

## 2023-05-30 RX ADMIN — SUCRALFATE 1 G: 1 TABLET ORAL at 07:51

## 2023-05-30 RX ADMIN — SUCRALFATE 1 G: 1 TABLET ORAL at 11:34

## 2023-05-30 RX ADMIN — HEPARIN SODIUM 5000 UNITS: 5000 INJECTION INTRAVENOUS; SUBCUTANEOUS at 05:53

## 2023-05-30 RX ADMIN — CYANOCOBALAMIN 1000 MCG: 1000 INJECTION, SOLUTION INTRAMUSCULAR at 17:27

## 2023-05-30 RX ADMIN — HEPARIN SODIUM 5000 UNITS: 5000 INJECTION INTRAVENOUS; SUBCUTANEOUS at 21:01

## 2023-05-30 RX ADMIN — INSULIN ASPART 10 UNITS: 100 INJECTION, SOLUTION INTRAVENOUS; SUBCUTANEOUS at 17:27

## 2023-05-30 RX ADMIN — INSULIN DETEMIR 45 UNITS: 100 INJECTION, SOLUTION SUBCUTANEOUS at 21:01

## 2023-05-30 RX ADMIN — INSULIN ASPART 5 UNITS: 100 INJECTION, SOLUTION INTRAVENOUS; SUBCUTANEOUS at 07:51

## 2023-05-30 RX ADMIN — ARIPIPRAZOLE 5 MG: 5 TABLET ORAL at 08:47

## 2023-05-30 RX ADMIN — HYDROCODONE BITARTRATE AND ACETAMINOPHEN 1 TABLET: 10; 325 TABLET ORAL at 21:01

## 2023-05-30 RX ADMIN — FUROSEMIDE 40 MG: 40 TABLET ORAL at 08:39

## 2023-05-30 RX ADMIN — ATORVASTATIN CALCIUM 80 MG: 40 TABLET, FILM COATED ORAL at 08:39

## 2023-05-30 RX ADMIN — SUCRALFATE 1 G: 1 TABLET ORAL at 21:01

## 2023-05-30 RX ADMIN — CLOPIDOGREL BISULFATE 75 MG: 75 TABLET ORAL at 08:39

## 2023-05-30 RX ADMIN — INSULIN DETEMIR 30 UNITS: 100 INJECTION, SOLUTION SUBCUTANEOUS at 08:47

## 2023-05-30 RX ADMIN — HEPARIN SODIUM 5000 UNITS: 5000 INJECTION INTRAVENOUS; SUBCUTANEOUS at 14:41

## 2023-05-30 RX ADMIN — Medication 1 APPLICATION: at 21:01

## 2023-05-30 RX ADMIN — VENLAFAXINE HYDROCHLORIDE 75 MG: 75 CAPSULE, EXTENDED RELEASE ORAL at 07:50

## 2023-05-30 RX ADMIN — Medication 10 ML: at 21:05

## 2023-05-30 RX ADMIN — FOLIC ACID 1000 MCG: 1 TABLET ORAL at 08:39

## 2023-05-30 RX ADMIN — ISOSORBIDE MONONITRATE 30 MG: 30 TABLET, EXTENDED RELEASE ORAL at 08:47

## 2023-05-30 RX ADMIN — ASPIRIN 325 MG: 325 TABLET, COATED ORAL at 08:39

## 2023-05-30 RX ADMIN — INSULIN ASPART 8 UNITS: 100 INJECTION, SOLUTION INTRAVENOUS; SUBCUTANEOUS at 11:35

## 2023-05-30 RX ADMIN — Medication 10 ML: at 08:51

## 2023-05-30 RX ADMIN — GABAPENTIN 200 MG: 100 CAPSULE ORAL at 08:39

## 2023-05-30 RX ADMIN — GABAPENTIN 200 MG: 100 CAPSULE ORAL at 21:01

## 2023-05-30 RX ADMIN — ONDANSETRON HYDROCHLORIDE 4 MG: 4 TABLET, FILM COATED ORAL at 11:34

## 2023-05-30 RX ADMIN — RANOLAZINE 500 MG: 500 TABLET, FILM COATED, EXTENDED RELEASE ORAL at 08:38

## 2023-05-30 RX ADMIN — TAMSULOSIN HYDROCHLORIDE 0.4 MG: 0.4 CAPSULE ORAL at 08:39

## 2023-05-30 RX ADMIN — HYDROCODONE BITARTRATE AND ACETAMINOPHEN 1 TABLET: 10; 325 TABLET ORAL at 11:34

## 2023-05-30 RX ADMIN — LEVOTHYROXINE SODIUM 50 MCG: 50 TABLET ORAL at 06:00

## 2023-05-30 RX ADMIN — HYDROCHLOROTHIAZIDE 12.5 MG: 12.5 TABLET ORAL at 08:38

## 2023-05-30 RX ADMIN — SUCRALFATE 1 G: 1 TABLET ORAL at 17:27

## 2023-05-30 RX ADMIN — AMLODIPINE BESYLATE 5 MG: 5 TABLET ORAL at 08:39

## 2023-05-30 RX ADMIN — PANTOPRAZOLE SODIUM 40 MG: 40 TABLET, DELAYED RELEASE ORAL at 08:40

## 2023-05-30 RX ADMIN — RANOLAZINE 500 MG: 500 TABLET, FILM COATED, EXTENDED RELEASE ORAL at 21:01

## 2023-05-30 RX ADMIN — Medication 1 APPLICATION: at 08:40

## 2023-05-30 RX ADMIN — HYDROCODONE BITARTRATE AND ACETAMINOPHEN 1 TABLET: 10; 325 TABLET ORAL at 03:47

## 2023-05-30 NOTE — PAYOR COMM NOTE
"  Linda Barnard CM  [facility]  [phone]      Phone  [phone]       Fax  Cont. Stay Review      Ariana Bailey (63 y.o. Female)     Date of Birth   1960    Social Security Number       Address   [address] [facility] [address]    Home Phone   [phone]    MRN   [id number]       Buddhist   Amish    Marital Status                               Admission Date   5/17/23    Admission Type   Emergency    Admitting Provider       Attending Provider   Santy Rodriguez MD    Department, Room/Bed   Psychiatric [address] [place], 411/1       Discharge Date       Discharge Disposition       Discharge Destination                               Attending Provider: Santy Rodriguez MD    Allergies: Seroquel [Quetiapine], Avandia [Rosiglitazone], Morphine And Related, Oxycodone    Isolation: None   Infection: None   Code Status: CPR    Ht: 172.7 cm (68\")   Wt: 122 kg (269 lb)    Admission Cmt: None   Principal Problem: Sepsis without acute organ dysfunction, due to unspecified organism [A41.9]                 Active Insurance as of 5/17/2023     Primary Coverage     Payor Plan Insurance Group Employer/Plan Group    [facility], [facility] [employer]PL[employer]EE [id number]     Payor Plan Address Payor Plan Phone Number Payor Plan Fax Number Effective Dates    [address] [phone]  1/1/2022 - None Entered    [place] [address]       Subscriber Name Subscriber Birth Date Member ID       ARIANA BAILEY 1960 [id number]                 Emergency Contacts      (Rel.) Home Phone Work Phone Mobile Phone    Nadeem Bailey (Spouse) [phone] -- [phone]    Елена David (Sister) [phone] -- [phone]            Vital Signs (last day)     Date/Time Temp Temp src Pulse Resp BP Patient Position SpO2    05/30/23 1102 96.6 (35.9) Temporal 79 18 143/65 Sitting 95    05/30/23 0751 -- -- 82 -- -- " -- --    05/30/23 0747 97.9 (36.6) Temporal 78 18 117/70 Sitting 98    05/30/23 0420 -- -- 73 -- -- -- --    05/30/23 0309 97 (36.1) Temporal 87 18 142/63 Lying 96    05/29/23 2306 97 (36.1) Temporal 79 18 107/52 Lying 92    05/29/23 1930 97 (36.1) Temporal 75 18 111/47 Lying 96    05/29/23 1600 -- -- 79 -- -- -- --    05/29/23 1420 97.3 (36.3) Temporal 79 18 100/55 Lying 93    05/29/23 1021 97.5 (36.4) Temporal 75 18 130/62 Sitting 95    05/29/23 0833 97.3 (36.3) Temporal 76 18 127/58 Sitting 97    05/29/23 0721 -- -- 78 -- -- -- --    05/29/23 0302 97.7 (36.5) Temporal 81 18 134/59 Lying 92    05/29/23 0024 -- -- 71 -- -- -- --          Oxygen Therapy (last day)     Date/Time SpO2 Device (Oxygen Therapy) Flow (L/min) Oxygen Concentration (%) ETCO2 (mmHg)    05/30/23 1102 95 room air -- -- --    05/30/23 0747 98 room air -- -- --    05/30/23 0309 96 room air -- -- --    05/29/23 2306 92 room air -- -- --    05/29/23 1930 96 room air -- -- --    05/29/23 1420 93 room air -- -- --    05/29/23 1021 95 room air -- -- --    05/29/23 0833 97 room air -- -- --    05/29/23 0832 -- room air -- -- --    05/29/23 0302 92 room air -- -- --          Current Facility-Administered Medications   Medication Dose Route Frequency Provider Last Rate Last Admin   • acetaminophen (TYLENOL) tablet 650 mg  650 mg Oral Q4H PRN BlackwaterRohan MD   650 mg at 05/24/23 2026   • aluminum-magnesium hydroxide-simethicone (MAALOX MAX) 400-400-40 MG/5ML suspension 15 mL  15 mL Oral Q6H PRN Rohan Doe MD       • amLODIPine (NORVASC) tablet 5 mg  5 mg Oral Daily Reji Mendoza DO   5 mg at 05/30/23 0839   • ARIPiprazole (ABILIFY) tablet 5 mg  5 mg Oral Daily Rohan Doe MD   5 mg at 05/30/23 0847   • aspirin EC tablet 325 mg  325 mg Oral Daily Rohan Doe MD   325 mg at 05/30/23 0839   • atorvastatin (LIPITOR) tablet 80 mg  80 mg Oral Daily Rohan Doe MD   80 mg at 05/30/23 0839   • Bag Balm ointment ointment 1  application  1 application Topical BID Rohan Doe MD   1 application at 05/30/23 0840   • sennosides-docusate (PERICOLACE) 8.6-50 MG per tablet 2 tablet  2 tablet Oral BID Rohan Doe MD   2 tablet at 05/27/23 0922    And   • polyethylene glycol (MIRALAX) packet 17 g  17 g Oral Daily PRN Rohan Doe MD   17 g at 05/24/23 0900    And   • bisacodyl (DULCOLAX) EC tablet 5 mg  5 mg Oral Daily PRN Rohan Doe MD   5 mg at 05/24/23 2026    And   • bisacodyl (DULCOLAX) suppository 10 mg  10 mg Rectal Daily PRN Rohan Doe MD       • clopidogrel (PLAVIX) tablet 75 mg  75 mg Oral Daily Rohan Doe MD   75 mg at 05/30/23 0839   • dextrose (D50W) (25 g/50 mL) IV injection 25 g  25 g Intravenous Q15 Min PRN Rohan Doe MD       • dextrose (GLUTOSE) oral gel 15 g  15 g Oral Q15 Min PRN Rohan Doe MD       • folic acid (FOLVITE) tablet 1,000 mcg  1,000 mcg Oral Daily Rohan Doe MD   1,000 mcg at 05/30/23 0839   • furosemide (LASIX) tablet 40 mg  40 mg Oral Daily Rohan Doe MD   40 mg at 05/30/23 0839   • gabapentin (NEURONTIN) capsule 200 mg  200 mg Oral Q12H Santy Rodriguez MD   200 mg at 05/30/23 0839   • glucagon (human recombinant) (GLUCAGEN DIAGNOSTIC) injection 1 mg  1 mg Intramuscular Q15 Min PRN Rohan Doe MD       • guaiFENesin-dextromethorphan (ROBITUSSIN DM) 100-10 MG/5ML syrup 5 mL  5 mL Oral Q4H PRN Rohan Doe MD   5 mL at 05/29/23 0838   • heparin (porcine) 5000 UNIT/ML injection 5,000 Units  5,000 Units Subcutaneous Q8H Rohan Doe MD   5,000 Units at 05/30/23 0553   • hydroCHLOROthiazide (HYDRODIURIL) tablet 12.5 mg  12.5 mg Oral Daily Rohan Doe MD   12.5 mg at 05/30/23 0838   • HYDROcodone-acetaminophen (NORCO)  MG per tablet 1 tablet  1 tablet Oral Q6H PRN Mahin Esteves MD   1 tablet at 05/30/23 1134   • Insulin Aspart (novoLOG) injection 0-14 Units  0-14 Units Subcutaneous TID TOSIN Doe  Rohan VERMA MD   8 Units at 05/30/23 1135   • insulin detemir (LEVEMIR) injection 30 Units  30 Units Subcutaneous Daily Mahin Esteves MD   30 Units at 05/30/23 0847   • insulin detemir (LEVEMIR) injection 45 Units  45 Units Subcutaneous Nightly Rohan Doe MD   45 Units at 05/29/23 2030   • isosorbide mononitrate (IMDUR) 24 hr tablet 30 mg  30 mg Oral Daily Rohan Doe MD   30 mg at 05/30/23 0847   • levothyroxine (SYNTHROID, LEVOTHROID) tablet 50 mcg  50 mcg Oral QAM Rohan Doe MD   50 mcg at 05/30/23 0600   • LORazepam (ATIVAN) tablet 0.5 mg  0.5 mg Oral Q8H PRN Rohan Doe MD   0.5 mg at 05/28/23 1057   • Magnesium Standard Dose Replacement - Follow Nurse / BPA Driven Protocol   Does not apply PRN Rohan Doe MD       • metoclopramide (REGLAN) tablet 10 mg  10 mg Oral 4x Daily PRN Rohan Doe MD   10 mg at 05/29/23 2029   • naloxone (NARCAN) injection 0.4 mg  0.4 mg Intravenous Q5 Min PRN Rohan Doe MD       • nitroglycerin (NITROSTAT) SL tablet 0.4 mg  0.4 mg Sublingual Q5 Min PRN Rohan Doe MD       • ondansetron (ZOFRAN) injection 4 mg  4 mg Intravenous Q6H PRN Rohan Doe MD       • ondansetron (ZOFRAN) tablet 4 mg  4 mg Oral Q6H PRN Rohan Doe MD   4 mg at 05/30/23 1134   • pantoprazole (PROTONIX) EC tablet 40 mg  40 mg Oral Daily Rohan Doe MD   40 mg at 05/30/23 0840   • ranolazine (RANEXA) 12 hr tablet 500 mg  500 mg Oral Q12H Rohan Doe MD   500 mg at 05/30/23 0838   • sodium chloride 0.9 % flush 10 mL  10 mL Intravenous PRN Rohan Doe MD       • sodium chloride 0.9 % flush 10 mL  10 mL Intravenous Q12H Rohan Doe MD   10 mL at 05/30/23 0851   • sodium chloride 0.9 % flush 10 mL  10 mL Intravenous PRN [place], Rohan VERMA MD       • sodium chloride 0.9 % flush 10 mL  10 mL Intravenous Q12H [place], Rohan VERMA MD   10 mL at 05/30/23 0851   • sodium chloride 0.9 % flush 10 mL  10 mL Intravenous PRN AshlandRohan MD        • sodium chloride 0.9 % infusion 40 mL  40 mL Intravenous PRN Rohan Doe MD       • sodium chloride 0.9 % infusion 40 mL  40 mL Intravenous PRN Rohan Doe MD   40 mL at 05/22/23 1327   • sucralfate (CARAFATE) tablet 1 g  1 g Oral 4x Daily Rohan Doe MD   1 g at 05/30/23 1134   • tamsulosin (FLOMAX) 24 hr capsule 0.4 mg  0.4 mg Oral Daily Rohan Doe MD   0.4 mg at 05/30/23 0839   • venlafaxine XR (EFFEXOR-XR) 24 hr capsule 75 mg  75 mg Oral Daily With Breakfast Rohan Doe MD   75 mg at 05/30/23 0750        Physician Progress Notes (last 48 hours)      Rohan Doe MD at 05/29/23 2225           LOS: 12 days     Patient Care Team:  Dolly Foss APRN as PCP - General (Family Medicine)  Uziel Alonso MD as Consulting Physician (Hematology and Oncology)  Elvis Gamboa APRN as Nurse Practitioner (Endocrinology)  Teressa Hammond APRN as Nurse Practitioner (Oncology)      Subjective     Urinary retention resolved    Objective       Vital Signs  Temp:  [97 °F (36.1 °C)-97.7 °F (36.5 °C)] 97 °F (36.1 °C)  Heart Rate:  [71-81] 75  Resp:  [18] 18  BP: (100-134)/(47-62) 111/47    Physical Exam:        General Appearance:   No distress     Respiratory:    UNLABORED RESPIRATIONS.     Abdomen:     SOFT.       Genitourinary:  Urine yellow voids well empties well     Rectal:     DEFERRED       Results Review:       Imaging Results (Last 24 Hours)     ** No results found for the last 24 hours. **        Lab Results (last 24 hours)     Procedure Component Value Units Date/Time    POC Glucose Once [[id number]]  (Abnormal) Collected: 05/29/23 1858    Specimen: Blood Updated: 05/29/23 1935     Glucose 315 mg/dL      Comment: RN NotifiedOperator: [id number] ELISABET Guilloryer ID: [id number]       POC Glucose Once [[id number]]  (Abnormal) Collected: 05/29/23 1632    Specimen: Blood Updated: 05/29/23 1703     Glucose 278 mg/dL      Comment: RN NotifiedOperator: [id number] NED  ANGELITAMeter ID: [id number]       POC Glucose Once [[id number]]  (Abnormal) Collected: 05/29/23 1021    Specimen: Blood Updated: 05/29/23 1114     Glucose 348 mg/dL      Comment: Result Not ConfirmedOperator: [id number] NED IZQUIERDOMeter ID: [id number]       POC Glucose Once [[id number]]  (Abnormal) Collected: 05/29/23 0706    Specimen: Blood Updated: 05/29/23 0736     Glucose 280 mg/dL      Comment: RN NotifiedOperator: [id number] NED AGRAWALITAMeter ID: [id number]       Extra Tubes [[id number]] Collected: 05/29/23 0530    Specimen: Blood, Venous Line Updated: 05/29/23 0631    Narrative:      The following orders were created for panel order Extra Tubes.  Procedure                               Abnormality         Status                     ---------                               -----------         ------                     Green Top (Gel)[[id number]]                                  Final result                 Please view results for these tests on the individual orders.    Green Top (Gel) [[id number]] Collected: 05/29/23 0530    Specimen: Blood Updated: 05/29/23 0631     Extra Tube Hold for add-ons.     Comment: Auto resulted.       CBC Auto Differential [[id number]]  (Abnormal) Collected: 05/29/23 0525    Specimen: Blood Updated: 05/29/23 0545     WBC 8.86 10*3/mm3      RBC 4.01 10*6/mm3      Hemoglobin 11.4 g/dL      Hematocrit 35.0 %      MCV 87.3 fL      MCH 28.4 pg      MCHC 32.6 g/dL      RDW 13.4 %      RDW-SD 42.5 fl      MPV 8.8 fL      Platelets 525 10*3/mm3      Neutrophil % 61.0 %      Lymphocyte % 27.7 %      Monocyte % 7.2 %      Eosinophil % 2.6 %      Basophil % 0.9 %      Immature Grans % 0.6 %      Neutrophils, Absolute 5.41 10*3/mm3      Lymphocytes, Absolute 2.45 10*3/mm3      Monocytes, Absolute 0.64 10*3/mm3      Eosinophils, Absolute 0.23 10*3/mm3      Basophils, Absolute 0.08 10*3/mm3      Immature Grans, Absolute 0.05 10*3/mm3      nRBC 0.0 /100 WBC             I reviewed the  patient's new clinical results.  I reviewed the patient's new imaging results and agree with the interpretation.  I reviewed the patient's other test results and agree with the interpretation        Assessment:    Urinary retention resolved    Plan:     We will see as needed will need InterStim battery change at some point      Rohan Doe MD  05/29/23  22:25 CDT      Electronically signed by Rohan Doe MD at 05/30/23 0749     Mahin Esteves MD at 05/29/23 1041              [facility] Medicine Services  INPATIENT PROGRESS NOTE    Length of Stay: 12  Date of Admission: 5/17/2023  Primary Care Physician: Dolly Foss APRN    Subjective   Chief Complaint: Right lower extremity pain and erythema, fever/chills  HPI: Sitting in chair after working with therapy.  Feels like she is doing some better.    As of today, 05/29/23  Review of Systems   Constitutional: Negative for appetite change, chills, fatigue, fever and unexpected weight change.   Respiratory: Negative for cough, choking, chest tightness, shortness of breath and wheezing.    Cardiovascular: Negative for chest pain, palpitations and leg swelling.   Gastrointestinal: Negative for abdominal pain, blood in stool, constipation, diarrhea, nausea and vomiting.   Genitourinary: Positive for difficulty urinating. Negative for dysuria, flank pain and hematuria.   Skin: Positive for color change.   Neurological: Negative for dizziness, seizures, syncope, speech difficulty, weakness, light-headedness, numbness and headaches.   Hematological: Does not bruise/bleed easily.        All pertinent negatives and positives are as above. All other systems have been reviewed and are negative unless otherwise stated.    Objective    Temp:  [96.9 °F (36.1 °C)-97.8 °F (36.6 °C)] 97.3 °F (36.3 °C)  Heart Rate:  [70-81] 76  Resp:  [18-20] 18  BP: (106-134)/(52-60) 127/58    AM-PAC 6 Clicks Score (PT): 12 (05/28/23  2037)    As of today, 05/29/23  Physical Exam  Vitals reviewed.   Constitutional:       Appearance: She is well-developed.   HENT:      Head: Normocephalic and atraumatic.   Eyes:      Pupils: Pupils are equal, round, and reactive to light.   Cardiovascular:      Rate and Rhythm: Normal rate and regular rhythm.      Heart sounds: Normal heart sounds. No murmur heard.    No friction rub. No gallop.   Pulmonary:      Effort: Pulmonary effort is normal. No respiratory distress.      Breath sounds: Normal breath sounds. No wheezing or rales.   Chest:      Chest wall: No tenderness.   Abdominal:      General: Bowel sounds are normal. There is no distension.      Palpations: Abdomen is soft.      Tenderness: There is no abdominal tenderness. There is no guarding.   Musculoskeletal:      Cervical back: Normal range of motion and neck supple.      Comments: Left BKA   Skin:     General: Skin is warm and dry.      Comments: Right lower extremity erythema improving.  Blister on dorsum of right foot.  Ruptured blister on medial aspect of right distal lower extremity.   Psychiatric:         Behavior: Behavior normal.         Thought Content: Thought content normal.       Results Review:  I have reviewed the labs, radiology results, and diagnostic studies.    Laboratory Data:   Results from last 7 days   Lab Units 05/28/23  0654 05/24/23  0511 05/23/23  2212 05/23/23  1706 05/23/23  0552   SODIUM mmol/L 136 138  --   --  139   POTASSIUM mmol/L 4.0 4.3 3.8   < > 3.4*   CHLORIDE mmol/L 98 95*  --   --  96*   CO2 mmol/L 28.0 31.0*  --   --  34.0*   BUN mg/dL 12 10  --   --  10   CREATININE mg/dL 0.92 0.96  --   --  0.88   GLUCOSE mg/dL 295* 244*  --   --  156*   CALCIUM mg/dL 9.3 9.2  --   --  9.7   ANION GAP mmol/L 10.0 12.0  --   --  9.0    < > = values in this interval not displayed.     Estimated Creatinine Clearance: 88.3 mL/min (by C-G formula based on SCr of 0.92 mg/dL).  Results from last 7 days   Lab Units 05/23/23 2212  05/23/23  1706   MAGNESIUM mg/dL 1.7 1.6         Results from last 7 days   Lab Units 05/29/23  0525 05/28/23  0653 05/27/23  0504 05/26/23  0543 05/25/23  0534   WBC 10*3/mm3 8.86 11.87* 11.53* 12.86* 17.37*   HEMOGLOBIN g/dL 11.4* 11.6* 11.2* 11.4* 11.2*   HEMATOCRIT % 35.0 35.7 34.0 35.4 35.2   PLATELETS 10*3/mm3 525* 488* 554* 568* 643*           Culture Data:   No results found for: BLOODCX  No results found for: URINECX  No results found for: RESPCX  No results found for: WOUNDCX  No results found for: STOOLCX  No components found for: BODYFLD    Radiology Data:   Imaging Results (Last 24 Hours)     ** No results found for the last 24 hours. **          I have utilized all available immediate resources to obtain, update, or review the patient's current medications (including all prescriptions, over-the-counter products, herbals, cannabis/cannabidiol products, and vitamin/mineral/dietary (nutritional) supplements).       Assessment/Plan     Active Hospital Problems    Diagnosis    • **Sepsis without acute organ dysfunction, due to unspecified organism    • Urinary retention        Plan:  1.  Right lower extremity cellulitis: Improving nicely.  Completed antibiotic course.  Wound care following.  2.  Urinary retention: Appreciate urology help.  Urinary retention is resolved   3.  Hypertension: Well-controlled.  4.  Coronary artery disease: Continue home medication.  5.  Diabetes mellitus: Glucose still not optimally controlled.  Levemir increased yesterday.  Monitor.  Some improvement.    6.  Deconditioning: Continue PT/OT.  Patient with extreme anxiety about going home.  Still feels like she needs rehab prior to going home.  7.  DVT prophylaxis: Heparin.      Medical Decision Making  Number and Complexity of problems: Several highly complex medical problems    Conditions and Status:        Condition is improving.       Discussed with: Patient and      Treatment Plan  As above    Care Planning  Shared  decision making: Patient in agreement with plan of care  Code status and discussions: Full code    Disposition  Social Determinants of Health that impact treatment or disposition: None  I expect the patient to be discharged to home in 1-2 days.       The patient was evaluated during the global COVID-19 pandemic, and the diagnosis was suspected/considered upon their initial presentation.  Evaluation, treatment, and testing were consistent with current guidelines for patients who present with complaints or symptoms that may be related to COVID-19.    I confirmed that the patient's Advance Care Plan is present, code status is documented, or surrogate decision maker is listed in the patient's medical record.        This document has been electronically signed by Mahin Esteves MD on May 29, 2023 10:43 CDT        Electronically signed by Mahin Esteves MD at 05/29/23 1047     Mahin Esteves MD at 05/28/23 1346              [facility] Medicine Services  INPATIENT PROGRESS NOTE    Length of Stay: 11  Date of Admission: 5/17/2023  Primary Care Physician: Dolly Foss APRN    Subjective   Chief Complaint: Right lower extremity pain and erythema, fever/chills  HPI: Doing okay this morning.  Patient is extremely anxious to go.  She feels like she is not strong enough.    As of today, 05/28/23  Review of Systems   Constitutional: Negative for appetite change, chills, fatigue, fever and unexpected weight change.   Respiratory: Negative for cough, choking, chest tightness, shortness of breath and wheezing.    Cardiovascular: Negative for chest pain, palpitations and leg swelling.   Gastrointestinal: Negative for abdominal pain, blood in stool, constipation, diarrhea, nausea and vomiting.   Genitourinary: Positive for difficulty urinating. Negative for dysuria, flank pain and hematuria.   Skin: Positive for color change.   Neurological: Negative for dizziness,  seizures, syncope, speech difficulty, weakness, light-headedness, numbness and headaches.   Hematological: Does not bruise/bleed easily.        All pertinent negatives and positives are as above. All other systems have been reviewed and are negative unless otherwise stated.    Objective    Temp:  [97.3 °F (36.3 °C)-98.4 °F (36.9 °C)] 97.8 °F (36.6 °C)  Heart Rate:  [64-86] 79  Resp:  [18-20] 20  BP: (107-149)/(51-72) 139/64    AM-PAC 6 Clicks Score (PT): 12 (05/28/23 0800)    As of today, 05/28/23  Physical Exam  Vitals reviewed.   Constitutional:       Appearance: She is well-developed.   HENT:      Head: Normocephalic and atraumatic.   Eyes:      Pupils: Pupils are equal, round, and reactive to light.   Cardiovascular:      Rate and Rhythm: Normal rate and regular rhythm.      Heart sounds: Normal heart sounds. No murmur heard.    No friction rub. No gallop.   Pulmonary:      Effort: Pulmonary effort is normal. No respiratory distress.      Breath sounds: Normal breath sounds. No wheezing or rales.   Chest:      Chest wall: No tenderness.   Abdominal:      General: Bowel sounds are normal. There is no distension.      Palpations: Abdomen is soft.      Tenderness: There is no abdominal tenderness. There is no guarding.   Musculoskeletal:      Cervical back: Normal range of motion and neck supple.      Comments: Left BKA   Skin:     General: Skin is warm and dry.      Comments: Right lower extremity erythema improving.  Blister on dorsum of right foot.  Ruptured blister on medial aspect of right distal lower extremity.   Psychiatric:         Behavior: Behavior normal.         Thought Content: Thought content normal.       Results Review:  I have reviewed the labs, radiology results, and diagnostic studies.    Laboratory Data:   Results from last 7 days   Lab Units 05/28/23  0654 05/24/23  0511 05/23/23  2212 05/23/23  1706 05/23/23  0552   SODIUM mmol/L 136 138  --   --  139   POTASSIUM mmol/L 4.0 4.3 3.8   < > 3.4*    CHLORIDE mmol/L 98 95*  --   --  96*   CO2 mmol/L 28.0 31.0*  --   --  34.0*   BUN mg/dL 12 10  --   --  10   CREATININE mg/dL 0.92 0.96  --   --  0.88   GLUCOSE mg/dL 295* 244*  --   --  156*   CALCIUM mg/dL 9.3 9.2  --   --  9.7   ANION GAP mmol/L 10.0 12.0  --   --  9.0    < > = values in this interval not displayed.     Estimated Creatinine Clearance: 88.7 mL/min (by C-G formula based on SCr of 0.92 mg/dL).  Results from last 7 days   Lab Units 05/23/23  2212 05/23/23  1706   MAGNESIUM mg/dL 1.7 1.6         Results from last 7 days   Lab Units 05/28/23  0653 05/27/23  0504 05/26/23  0543 05/25/23  0534 05/24/23  0511   WBC 10*3/mm3 11.87* 11.53* 12.86* 17.37* 13.22*   HEMOGLOBIN g/dL 11.6* 11.2* 11.4* 11.2* 12.0   HEMATOCRIT % 35.7 34.0 35.4 35.2 36.5   PLATELETS 10*3/mm3 488* 554* 568* 643* 491*           Culture Data:   No results found for: BLOODCX  No results found for: URINECX  No results found for: RESPCX  No results found for: WOUNDCX  No results found for: STOOLCX  No components found for: BODYFLD    Radiology Data:   Imaging Results (Last 24 Hours)     ** No results found for the last 24 hours. **          I have utilized all available immediate resources to obtain, update, or review the patient's current medications (including all prescriptions, over-the-counter products, herbals, cannabis/cannabidiol products, and vitamin/mineral/dietary (nutritional) supplements).       Assessment/Plan     Active Hospital Problems    Diagnosis    • **Sepsis without acute organ dysfunction, due to unspecified organism    • Urinary retention        Plan:  1.  Right lower extremity cellulitis: Improving nicely.  Completed antibiotic course.  Wound care following.  2.  Urinary retention: Appreciate urology help.  Urinary retention is resolved   3.  Hypertension: Well-controlled.  4.  Coronary artery disease: Continue home medication.  5.  Diabetes mellitus: Glucose still not optimally controlled.  Will increase a.m.  Levemir again.  6.  Deconditioning: Continue PT/OT.  Patient with extreme anxiety about going home.  May need rehab prior to discharge.  7.  DVT prophylaxis: Heparin.      Medical Decision Making  Number and Complexity of problems: Several highly complex medical problems    Conditions and Status:        Condition is improving.       Discussed with: Patient and      Treatment Plan  As above    Care Planning  Shared decision making: Patient in agreement with plan of care  Code status and discussions: Full code    Disposition  Social Determinants of Health that impact treatment or disposition: None  I expect the patient to be discharged to home in 1-2 days.       The patient was evaluated during the global COVID-19 pandemic, and the diagnosis was suspected/considered upon their initial presentation.  Evaluation, treatment, and testing were consistent with current guidelines for patients who present with complaints or symptoms that may be related to COVID-19.    I confirmed that the patient's Advance Care Plan is present, code status is documented, or surrogate decision maker is listed in the patient's medical record.        This document has been electronically signed by Mahin Esteves MD on May 28, 2023 13:46 CDT        Electronically signed by Mahin Esteves MD at 05/28/23 4103

## 2023-05-30 NOTE — PROGRESS NOTES
LOS: 12 days     Patient Care Team:  Dolly Foss APRN as PCP - General (Family Medicine)  Uziel Alonso MD as Consulting Physician (Hematology and Oncology)  Elvis Gamboa APRN as Nurse Practitioner (Endocrinology)  Teressa Hammond APRN as Nurse Practitioner (Oncology)      Subjective     Urinary retention resolved    Objective       Vital Signs  Temp:  [97 °F (36.1 °C)-97.7 °F (36.5 °C)] 97 °F (36.1 °C)  Heart Rate:  [71-81] 75  Resp:  [18] 18  BP: (100-134)/(47-62) 111/47    Physical Exam:        General Appearance:   No distress     Respiratory:    UNLABORED RESPIRATIONS.     Abdomen:     SOFT.       Genitourinary:  Urine yellow voids well empties well     Rectal:     DEFERRED       Results Review:       Imaging Results (Last 24 Hours)     ** No results found for the last 24 hours. **        Lab Results (last 24 hours)     Procedure Component Value Units Date/Time    POC Glucose Once [[id number]]  (Abnormal) Collected: 05/29/23 1858    Specimen: Blood Updated: 05/29/23 1935     Glucose 315 mg/dL      Comment: RN NotifiedOperator: [id number] ELISABET WESTBROOKINAMeter ID: [id number]       POC Glucose Once [[id number]]  (Abnormal) Collected: 05/29/23 1632    Specimen: Blood Updated: 05/29/23 1703     Glucose 278 mg/dL      Comment: RN NotifiedOperator: [id number] NED ANGELITAMeter ID: [id number]       POC Glucose Once [[id number]]  (Abnormal) Collected: 05/29/23 1021    Specimen: Blood Updated: 05/29/23 1114     Glucose 348 mg/dL      Comment: Result Not ConfirmedOperator: [id number] TIREY ANGELITAMeter ID: [id number]       POC Glucose Once [[id number]]  (Abnormal) Collected: 05/29/23 0706    Specimen: Blood Updated: 05/29/23 0736     Glucose 280 mg/dL      Comment: RN NotifiedOperator: [id number] TIREY ANGELITAMeter ID: [id number]       Extra Tubes [[id number]] Collected: 05/29/23 0530    Specimen: Blood, Venous Line Updated: 05/29/23 0631    Narrative:      The following orders were created for  panel order Extra Tubes.  Procedure                               Abnormality         Status                     ---------                               -----------         ------                     Green Top (Gel)[[id number]]                                  Final result                 Please view results for these tests on the individual orders.    Green Top (Gel) [[id number]] Collected: 05/29/23 0530    Specimen: Blood Updated: 05/29/23 0631     Extra Tube Hold for add-ons.     Comment: Auto resulted.       CBC Auto Differential [[id number]]  (Abnormal) Collected: 05/29/23 0525    Specimen: Blood Updated: 05/29/23 0545     WBC 8.86 10*3/mm3      RBC 4.01 10*6/mm3      Hemoglobin 11.4 g/dL      Hematocrit 35.0 %      MCV 87.3 fL      MCH 28.4 pg      MCHC 32.6 g/dL      RDW 13.4 %      RDW-SD 42.5 fl      MPV 8.8 fL      Platelets 525 10*3/mm3      Neutrophil % 61.0 %      Lymphocyte % 27.7 %      Monocyte % 7.2 %      Eosinophil % 2.6 %      Basophil % 0.9 %      Immature Grans % 0.6 %      Neutrophils, Absolute 5.41 10*3/mm3      Lymphocytes, Absolute 2.45 10*3/mm3      Monocytes, Absolute 0.64 10*3/mm3      Eosinophils, Absolute 0.23 10*3/mm3      Basophils, Absolute 0.08 10*3/mm3      Immature Grans, Absolute 0.05 10*3/mm3      nRBC 0.0 /100 WBC             I reviewed the patient's new clinical results.  I reviewed the patient's new imaging results and agree with the interpretation.  I reviewed the patient's other test results and agree with the interpretation        Assessment:    Urinary retention resolved    Plan:     We will see as needed will need InterSt[employer] battery change at some point      Rohan Doe MD  05/29/23  22:25 CDT

## 2023-05-30 NOTE — THERAPY PROGRESS REPORT/RE-CERT
Patient Name: Ariana Martinez  : 1960    MRN: [id number]                              Today's Date: 2023       Admit Date: 2023    Visit Dx:     ICD-10-CM ICD-9-CM   1. Sepsis without acute organ dysfunction, due to unspecified organism  A41.9 038.9     995.91   2. Cellulitis of right lower extremity  L03.115 682.6   3. Type 2 diabetes mellitus with hyperglycemia, unspecified whether long term insulin use  E11.65 250.00   4. Impaired mobility and activities of daily living  Z74.09 V49.89    Z78.9    5. Impaired physical mobility  Z74.09 781.99   6. Impaired mobility and ADLs  Z74.09 V49.89    Z78.9    7. Urinary retention  R33.9 788.20     Patient Active Problem List   Diagnosis   • 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, with long-term current use of insulin   • Closed nondisplaced fracture of fifth left metatarsal bone   • MAURICIO (generalized anxiety disorder)   • Depression, major, recurrent, moderate (HCC)   • GERD without esophagitis   • Long term prescription opiate use   • Mixed hyperlipidemia   • Vitamin D deficiency   • Seasonal allergic rhinitis   • Restrictive lung disease secondary to obesity   • Adult body mass index 37.0-37.9   • Snoring   • Class 3 severe obesity due to excess calories without serious comorbidity with body mass index (BMI) of 40.0 to 44.9 in adult (HCC)   • (HFpEF) heart failure with preserved ejection fraction   • Type 2 diabetes mellitus with hyperglycemia, with long-term current use of insulin (Formerly [facility])   • Cyanocobalamin deficiency   • Coronary artery disease of native artery of native heart with stable angina pectoris   • Hypertension   • Meniere's disease   • Gastroparesis   • Pulmonary hypertension ([facility])   • Pes cavus   • Primary osteoarthritis involving multiple joints   • Generalized anxiety disorder   • Chronic right-sided low back pain with right-sided sciatica   • Chest pain   • Adverse effect of iron   • Chest pain due to myocardial  ischemia   • Nonrheumatic tricuspid valve regurgitation   • Iron deficiency anemia due to chronic blood loss   • Malaise and fatigue   • Ankle arthritis   • Urinary retention   • Endocarditis   • Essential hypertension   • Occlusion and stenosis of bilateral carotid arteries   • S/P BKA (below knee amputation) unilateral, left (HCC)   • Phantom pain after amputation of lower extremity   • S/P CABG (coronary artery bypass graft)   • Severe malnutrition   • TIA (transient ischemic attack)   • Coronary artery abnormality   • Elevated d-dimer   • Neuropathy   • Chest pain, unspecified type   • Acute pain of right shoulder   • Rotator cuff syndrome, right   • Acquired hypothyroidism   • Bilateral leg edema   • Left elbow pain   • Gastritis   • Olecranon bursitis, left elbow   • Sepsis without acute organ dysfunction, due to unspecified organism     Past Medical History:   Diagnosis Date   • Acute bacterial endocarditis 3/13/2021   • Angina, class IV    • Anxiety    • Anxiety and depression    • Arthritis    • Benign paroxysmal positional vertigo    • Bladder disorder     has bladder stimulator   • Carpal tunnel syndrome    • CHF (congestive heart failure)    • Chronic pain    • Coronary atherosclerosis     hx CABG 2005.  is followed by Dr Kwon   • Depression    • Diabetes mellitus     Type 2, controlled   • Diabetic polyneuropathy    • Disease of thyroid gland    • Elevated cholesterol    • Female stress incontinence    • Foot pain, left    • Full dentures    • Gastroparesis    • GERD (gastroesophageal reflux disease)    • Hyperlipidemia    • Hypertension    • Low back pain    • Malaise and fatigue    • Multiple joint pain    • Obesity     Refuses to be weighed   • Occlusion and stenosis of bilateral carotid arteries 6/18/2021   • Otalgia     Both   • Perforation of tympanic membrane     Left   • Postoperative wound infection    • Vitamin D deficiency    • Wears glasses     reading     Past Surgical History:    Procedure Laterality Date   • ABDOMINAL SURGERY     • AMPUTATION FOOT     • ANGIOPLASTY      coronary   • ANKLE ARTHROSCOPY Left 02/26/2021    Procedure: Left foot hardware removal, ankle arthroscopy, bone grafting , left foot exostectomy;  Surgeon: Ignacio Lord DPM;  Location: [facility] OR;  Service: Podiatry;  Laterality: Left;   • BREAST BIOPSY Right    • CARDIAC CATHETERIZATION     • CARDIAC CATHETERIZATION N/A 06/20/2017    Procedure: Right Heart Cath;  Surgeon: Can Kwon MD PhD;  Location: [facility] CATH INVASIVE LOCATION;  Service:    • CARDIAC CATHETERIZATION N/A 02/18/2020    Procedure: Left Heart Cath;  Surgeon: Catalina Cooper MD;  Location: [facility] CATH INVASIVE LOCATION;  Service: Cardiology;  Laterality: N/A;   • CARDIAC CATHETERIZATION N/A 04/06/2022    Procedure: Left Heart Cath;  Surgeon: Sheryl Navas MD;  Location: [facility] CATH INVASIVE LOCATION;  Service: Cardiology;  Laterality: N/A;   • CARPAL TUNNEL RELEASE     • CHOLECYSTECTOMY     • COLONOSCOPY N/A 06/24/2020    Procedure: COLONOSCOPY;  Surgeon: Julián Maldonado MD;  Location: [facility] ENDOSCOPY;  Service: Gastroenterology;  Laterality: N/A;   • CORONARY ARTERY BYPASS GRAFT  2005   • ENDOSCOPY N/A 10/19/2018    Procedure: ESOPHAGOGASTRODUODENOSCOPY possible dilation;  Surgeon: Julián Maldonado MD;  Location: [facility] ENDOSCOPY;  Service: Gastroenterology   • ENDOSCOPY N/A 06/24/2020    Procedure: ESOPHAGOGASTRODUODENOSCOPY WED appt please;  Surgeon: Julián Maldonado MD;  Location: [facility] ENDOSCOPY;  Service: Gastroenterology;  Laterality: N/A;   • ENDOSCOPY N/A 06/10/2022    Procedure: ESOPHAGOGASTRODUODENOSCOPY   room 380;  Surgeon: Jeremiah Wilkins MD;  Location: [facility] ENDOSCOPY;  Service: Gastroenterology;  Laterality: N/A;   • ENDOSCOPY N/A 1/10/2023    Procedure: ESOPHAGOGASTRODUODENOSCOPY;  Surgeon: Jeremiah Wilkins MD;  Location: [facility] ENDOSCOPY;  Service: Gastroenterology;  Laterality: N/A;   • ENDOSCOPY AND  COLONOSCOPY     • FOOT SURGERY      Toes   • FOOT SURGERY     • GASTRIC BANDING      Revision, laparoscopic   • HYSTERECTOMY     • INCISION AND DRAINAGE LEG Left 03/12/2021    Procedure: Left ankle arthroscopic irrigation and debridement, screw removal;  Surgeon: Ignacio Lord DPM;  Location: [facility];  Service: Podiatry;  Laterality: Left;   • MOUTH SURGERY     • SALPINGO OOPHORECTOMY     • SHOULDER SURGERY     • SUBTALAR ARTHRODESIS Left 01/16/2019    Procedure: LEFT FOOT HARDWARE REMOVAL, FIFTH METATARSAL , OPEN REDUCTION INTERNAL FIXATION, CALCANEAL OSTEOTOMY;  Surgeon: Ignacio Lord DPM;  Location: [facility];  Service: Podiatry   • SUBTALAR ARTHRODESIS Left 10/16/2019    Procedure: foot hardware removal, subtalar joint fusion  possible de/reattachment of achilles tendon        (c-arm);  Surgeon: Ignacio Lord DPM;  Location: [facility];  Service: Podiatry   • SUBTALAR ARTHRODESIS Left 09/30/2020    Procedure: subtalar, talonavicular joint arthrodesis.  Removal hardware.          (c-arm);  Surgeon: Ignacio Lord DPM;  Location: [facility];  Service: Podiatry;  Laterality: Left;   • TRANSESOPHAGEAL ECHOCARDIOGRAM (LAMONTE)      With color flow      General Information     Row Name 05/30/23 1002          OT Time and Intention    Document Type progress note/recertification  -     Mode of Treatment individual therapy;occupational therapy  -     Row Name 05/30/23 1002          General Information    Patient Profile Reviewed yes  -     Existing Precautions/Restrictions fall  -     Row Name 05/30/23 1002          Cognition    Orientation Status (Cognition) oriented x 4  -     Row Name 05/30/23 1002          Safety Issues, Functional Mobility    Impairments Affecting Function (Mobility) endurance/activity tolerance;pain;strength  -           User Key  (r) = Recorded By, (t) = Taken By, (c) = Cosigned By    Initials Name Provider Type     Dana Burger OT Occupational Therapist                  Mobility/ADL's     [facility] Name 05/30/23 1002          Bed Mobility    Bed Mobility supine-sit  -     Supine-Sit [place] (Bed Mobility) modified independence  -MC     Row Name 05/30/23 1002          Sit-Stand Transfer    Sit-Stand [place] (Transfers) contact guard  -     Assistive Device (Sit-Stand Transfers) walker, front-wheeled  -     Comment, (Sit-Stand Transfer) with L prothesis  -[facility] Name 05/30/23 1002          Stand-Sit Transfer    Stand-Sit [place] (Transfers) contact guard  -     Assistive Device (Stand-Sit Transfers) walker, front-wheeled  -[facility] Name 05/30/23 1002          Toilet Transfer    Type (Toilet Transfer) sit-stand;stand-sit  -     [place] Level (Toilet Transfer) contact guard  -     Assistive Device (Toilet Transfer) commode, bedside with drop arms;walker, front-wheeled  -[facility] Name 05/30/23 1002          Activities of Daily Living    BADL Assessment/Intervention toileting;lower body dressing;grooming  -[facility] 05/30/23 1002          Grooming Assessment/Training    [place] Level (Grooming) hair care, combing/brushing;set up  -     Position (Grooming) edge of bed sitting  -[facility] Name 05/30/23 1002          Lower Body Dressing Assessment/Training    [place] Level (Lower Body Dressing) don;pants/bottoms;shoes/slippers;moderate assist (50% patient effort)  -MC     Row Name 05/30/23 1002          Toileting Assessment/Training    [place] Level (Toileting) toileting skills;set up  -     Position (Toileting) supported sitting  -           User Key  (r) = Recorded By, (t) = Taken By, (c) = Cosigned By    Initials Name Provider Type    Dana Machuca OT Occupational Therapist               Obj/Interventions    No documentation.                Goals/Plan     [facility] Name 05/30/23 1002          Transfer Goal 1 (OT)    Activity/Assistive Device (Transfer Goal 1, OT) toilet;wheelchair transfer  -     [place]  Level/Cues Needed (Transfer Goal 1, OT) modified independence  -     Time Frame (Transfer Goal 1, OT) long term goal (LTG)  -MC     Progress/Outcome (Transfer Goal 1, OT) goal not met  -     Row Name 05/30/23 1002          Bathing Goal 1 (OT)    Activity/Device (Bathing Goal 1, OT) bathing skills, all  -     [place] Level/Cues Needed (Bathing Goal 1, OT) modified independence  -     Time Frame (Bathing Goal 1, OT) long term goal (LTG)  -     Progress/Outcomes (Bathing Goal 1, OT) goal not met  -     Row Name 05/30/23 1002          Dressing Goal 1 (OT)    Activity/Device (Dressing Goal 1, OT) lower body dressing  -     [place]/Cues Needed (Dressing Goal 1, OT) modified independence  -     Time Frame (Dressing Goal 1, OT) long term goal (LTG)  -     Progress/Outcome (Dressing Goal 1, OT) goal not met  -     Row Name 05/30/23 1002          Toileting Goal 1 (OT)    Activity/Device (Toileting Goal 1, OT) toileting skills, all  -     [place] Level/Cues Needed (Toileting Goal 1, OT) modified independence  -     Time Frame (Toileting Goal 1, OT) long term goal (LTG)  -     Progress/Outcome (Toileting Goal 1, OT) goal not met  -     Row Name 05/30/23 1002          Therapy Assessment/Plan (OT)    Planned Therapy Interventions (OT) activity tolerance training;adaptive equipment training;BADL retraining;edema control/reduction;functional balance retraining;IADL retraining;passive ROM/stretching;occupation/activity based interventions;cognitive/visual perception retraining;neuromuscular control/coordination retraining;manual therapy/joint mobilization;patient/caregiver education/training;ROM/therapeutic exercise;prosthetic fitting/training;strengthening exercise;transfer/mobility retraining;wheelchair assessment/training  -           User Key  (r) = Recorded By, (t) = Taken By, (c) = Cosigned By    Initials Name Provider Type    Dana Machuca, OT Occupational Therapist                Clinical Impression     Row Name 05/30/23 1002          Pain Assessment    Pre/Posttreatment Pain Comment Pt reports phantom pain in her R ankle with fxnl mobility  -     Row Name 05/30/23 1002          Plan of Care Review    Plan of Care Reviewed With patient  -     Progress improving  -     Outcome Evaluation OT tx re-cert. Pt fowlers in bed upon entering and agreeable to the session. Pt is mod I for sup<>sit with HOB elevated and use of bed rails. Pt required min A to don R prothesis while sitting EOB. Pt reports that her RLE begins to hurt after wearing her new prothesis after ~30 minutes. Pt mod A for donning L shoe. Pt CGA for sit<>stand t/f with RW. Pt is CGA for fxnl mobility in the room with R prothesis and RW. Pt CGA for toilet t/f to [facility]. Pt completed all toileting skills with s/u. Pt min A for donning underwear and shorts. Pt left up in chair with R prothesis donned. Pt making good progress towards her goals. Pt could tolerate 3 hours of therapy a day. Recommend continued rehab services at d/c.  -     Row Name 05/30/23 1002          Therapy Assessment/Plan (OT)    Rehab Potential (OT) good, to achieve stated therapy goals  -     Criteria for Skilled Therapeutic Interventions Met (OT) yes;skilled treatment is necessary  -     Therapy Frequency (OT) other (see comments)  5-7 d/wk  -     Row Name 05/30/23 1002          Therapy Plan Review/Discharge Plan (OT)    Anticipated Discharge Disposition (OT) other (see comments)  continued rehab services  -     Row Name 05/30/23 1002          Vital Signs    Post Systolic BP Rehab 143  -MC     Post Treatment Diastolic BP 65  -MC     Pre SpO2 (%) 96  -     O2 Delivery Pre Treatment room air  -     Post Patient Position Sitting  -     Row Name 05/30/23 1002          Positioning and Restraints    Pre-Treatment Position in bed  -     Post Treatment Position chair  -     In Chair reclined;call light within reach;encouraged to call for assist   -           User Key  (r) = Recorded By, (t) = Taken By, (c) = Cosigned By    Initials Name Provider Type    Dana Machuca OT Occupational Therapist               Outcome Measures     Row Name 05/30/23 1002          How much help from another is currently needed...    Putting on and taking off regular lower body clothing? 3  -MC     Bathing (including washing, rinsing, and drying) 3  -MC     Toileting (which includes using toilet bed pan or urinal) 3  -MC     Putting on and taking off regular upper body clothing 4  -MC     Taking care of personal grooming (such as brushing teeth) 4  -MC     Eating meals 4  -MC     AM-PAC 6 Clicks Score (OT) 21  -MC     Row Name 05/30/23 1002          Functional Assessment    Outcome Measure Options AM-PAC 6 Clicks Daily Activity (OT)  -           User Key  (r) = Recorded By, (t) = Taken By, (c) = Cosigned By    Initials Name Provider Type    Dana Machuca OT Occupational Therapist                Occupational Therapy Education     Title: PT OT SLP Therapies (In Progress)     Topic: Occupational Therapy (Done)     Point: ADL training (Done)     Description:   Instruct learner(s) on proper safety adaptation and remediation techniques during self care or transfers.   Instruct in proper use of assistive devices.              Learning Progress Summary           Patient Acceptance, E,TB, VU by  at 5/30/2023 1133    Comment: OT role, POC, t/f training    Acceptance, E, VU by AB at 5/30/2023 0516    Acceptance, E,TB, VU by LW at 5/26/2023 1516    Acceptance, E,TB, VU by LW at 5/25/2023 1459    Acceptance, E,TB, VU by RW at 5/22/2023 1243    Comment: POC, Role of OT    Acceptance, E,TB, VU by BB at 5/20/2023 1358    Acceptance, E,TB, VU by BB at 5/20/2023 1357                   Point: Home exercise program (Done)     Description:   Instruct learner(s) on appropriate technique for monitoring, assisting and/or progressing therapeutic exercises/activities.               Learning Progress Summary           Patient Acceptance, E, VU by AB at 5/30/2023 0516    Acceptance, E,TB, VU by LW at 5/26/2023 1516    Acceptance, E,TB, VU by LW at 5/25/2023 1459    Acceptance, E,TB, VU by BB at 5/20/2023 1356                   Point: Precautions (Done)     Description:   Instruct learner(s) on prescribed precautions during self-care and functional transfers.              Learning Progress Summary           Patient Acceptance, E,TB, VU by MC at 5/30/2023 1133    Comment: OT role, POC, t/f training    Acceptance, E, VU by AB at 5/30/2023 0516    Acceptance, E,TB, VU by LW at 5/26/2023 1516    Acceptance, E,TB, VU by LW at 5/25/2023 1459    Acceptance, E,TB, VU by RW at 5/22/2023 1243    Comment: POC, Role of OT    Acceptance, E, DU by RB at 5/18/2023 1352    Comment: Pt edu on use of gait belt and non skid socks when OOB and no OOB without assist.                   Point: Body mechanics (Done)     Description:   Instruct learner(s) on proper positioning and spine alignment during self-care, functional mobility activities and/or exercises.              Learning Progress Summary           Patient Acceptance, E,TB, VU by MC at 5/30/2023 1133    Comment: OT role, POC, t/f training    Acceptance, E, VU by AB at 5/30/2023 0516    Acceptance, E,TB, VU by LW at 5/26/2023 1516    Acceptance, E,TB, VU by LW at 5/25/2023 1459    Acceptance, E,TB, VU by RW at 5/22/2023 1243    Comment: POC, Role of OT    Acceptance, E,TB, VU by BB at 5/20/2023 1358    Acceptance, E,TB, VU by BB at 5/20/2023 1357                               User Key     Initials Effective Dates Name Provider Type Discipline     06/16/21 -  Fredi France OT Occupational Therapist OT    BB 06/16/21 -  Matilde Rios COTA Occupational Therapist Assistant OT    LW 06/16/21 -  Sarah Irby COTA Occupational Therapist Assistant OT     10/19/22 -  Tao, Dana, OT Occupational Therapist OT    RW 09/22/22 -  Missy Esteves, OT  Occupational Therapist OT    AB 11/07/22 -  Joana Mc LPN Licensed Nurse Nurse              OT Recommendation and Plan  Planned Therapy Interventions (OT): activity tolerance training, adaptive equipment training, BADL retraining, edema control/reduction, functional balance retraining, IADL retraining, passive ROM/stretching, occupation/activity based interventions, cognitive/visual perception retraining, neuromuscular control/coordination retraining, manual therapy/joint mobilization, patient/caregiver education/training, ROM/therapeutic exercise, prosthetic fitting/training, strengthening exercise, transfer/mobility retraining, wheelchair assessment/training  Therapy Frequency (OT): other (see comments) (5-7 d/wk)  Plan of Care Review  Plan of Care Reviewed With: patient  Progress: improving  Outcome Evaluation: OT tx re-cert. Pt fowlers in bed upon entering and agreeable to the session. Pt is mod I for sup<>sit with HOB elevated and use of bed rails. Pt required min A to don R prothesis while sitting EOB. Pt reports that her RLE begins to hurt after wearing her new prothesis after ~30 minutes. Pt mod A for donning L shoe. Pt CGA for sit<>stand t/f with RW. Pt is CGA for fxnl mobility in the room with R prothesis and RW. Pt CGA for toilet t/f to [facility]. Pt completed all toileting skills with s/u. Pt min A for donning underwear and shorts. Pt left up in chair with R prothesis donned. Pt making good progress towards her goals. Pt could tolerate 3 hours of therapy a day. Recommend continued rehab services at d/c.     Time Calculation:    Time Calculation- OT     Row Name 05/30/23 1002             Time Calculation- OT    OT Start Time 1002  -      OT Stop Time 1105  -      OT Time Calculation (min) 63 min  -      Total Timed Code Minutes- OT 63 minute(s)  -      OT Received On 05/30/23  -      OT Goal Re-Cert Due Date 06/12/23  -         Timed Charges    60617 - OT Therapeutic Activity Minutes 33  -MC       71331 - OT Self Care/Mgmt Minutes 30  -MC         Total Minutes    Timed Charges Total Minutes 63  -MC       Total Minutes 63  -MC            User Key  (r) = Recorded By, (t) = Taken By, (c) = Cosigned By    Initials Name Provider Type    Dana Machuca OT Occupational Therapist              Therapy Charges for Today     Code Description Service Date Service Provider Modifiers Qty    [id number]  OT THERAPEUTIC ACT EA 15 MIN 5/30/2023 Dana Burger OT GO 2    [id number]  OT SELF CARE/MGMT/TRAIN EA 15 MIN 5/30/2023 Dana Burger OT GO 2               Dana Burger OT  5/30/2023

## 2023-05-30 NOTE — PLAN OF CARE
Goal Outcome Evaluation:  Plan of Care Reviewed With: patient        Progress: improving  Outcome Evaluation: OT tx re-cert. Pt fowlers in bed upon entering and agreeable to the session. Pt is mod I for sup<>sit with HOB elevated and use of bed rails. Pt required min A to don R prothesis while sitting EOB. Pt reports that her RLE begins to hurt after wearing her new prothesis after ~30 minutes. Pt mod A for donning L shoe. Pt CGA for sit<>stand t/f with RW. Pt is CGA for fxnl mobility in the room with R prothesis and RW. Pt CGA for toilet t/f to [facility]. Pt completed all toileting skills with s/u. Pt min A for donning underwear and shorts. Pt left up in chair with R prothesis donned. Pt making good progress towards her goals. Pt could tolerate 3 hours of therapy a day. Recommend continued rehab services at d/c.

## 2023-05-30 NOTE — PROGRESS NOTES
Adult Nutrition  Assessment    Patient Name:  Ariana Martinez  YOB: 1960  MRN: [id number]  Admit Date:  5/17/2023    Assessment Date:  5/30/2023    Comments:  Pt continues treatment for RLE cellulitis. Pt is being seen by urology for urinary retention, s/p cysto 5/27. Pt notes to have wounds x2 on toes of right foot, partial amputation of left foot. ADA diet, intakes >75% for LOS. Epic notes wts in Mar/April 2023- 255# and #. Alb 2.8L on admit. Last BM noted 5/25 and 5/26 as well as 5/28in flowsheet. Pt states meals are great, no concerns. RD continues w/ has no nutritional concerns at this time. RD to follow hospital course.      Electronically signed by:  Juany Rai RD  05/30/23 15:21 CDT

## 2023-05-30 NOTE — PROGRESS NOTES
[facility] Medicine Services  INPATIENT PROGRESS NOTE    Length of Stay: 13  Date of Admission: 5/17/2023  Primary Care Physician: Dolly Foss APRN    Subjective   Chief Complaint: Right lower extremity pain and erythema, fever/chills  HPI: States she is feeling better today.  Still concerned that she is unsafe to go home.    As of today, 05/30/23  Review of Systems   Constitutional: Negative for appetite change, chills, fatigue, fever and unexpected weight change.   Respiratory: Negative for cough, choking, chest tightness, shortness of breath and wheezing.    Cardiovascular: Negative for chest pain, palpitations and leg swelling.   Gastrointestinal: Negative for abdominal pain, blood in stool, constipation, diarrhea, nausea and vomiting.   Genitourinary: Positive for difficulty urinating. Negative for dysuria, flank pain and hematuria.   Skin: Positive for color change.   Neurological: Negative for dizziness, seizures, syncope, speech difficulty, weakness, light-headedness, numbness and headaches.   Hematological: Does not bruise/bleed easily.        All pertinent negatives and positives are as above. All other systems have been reviewed and are negative unless otherwise stated.    Objective    Temp:  [96.6 °F (35.9 °C)-97.9 °F (36.6 °C)] 96.6 °F (35.9 °C)  Heart Rate:  [73-87] 79  Resp:  [18] 18  BP: (107-143)/(47-70) 143/65    AM-PAC 6 Clicks Score (PT): 14 (05/30/23 0750)    As of today, 05/30/23  Physical Exam  Vitals reviewed.   Constitutional:       Appearance: She is well-developed.   HENT:      Head: Normocephalic and atraumatic.   Eyes:      Pupils: Pupils are equal, round, and reactive to light.   Cardiovascular:      Rate and Rhythm: Normal rate and regular rhythm.      Heart sounds: Normal heart sounds. No murmur heard.    No friction rub. No gallop.   Pulmonary:      Effort: Pulmonary effort is normal. No respiratory distress.      Breath  sounds: Normal breath sounds. No wheezing or rales.   Chest:      Chest wall: No tenderness.   Abdominal:      General: Bowel sounds are normal. There is no distension.      Palpations: Abdomen is soft.      Tenderness: There is no abdominal tenderness. There is no guarding.   Musculoskeletal:      Cervical back: Normal range of motion and neck supple.      Comments: Left BKA   Skin:     General: Skin is warm and dry.      Comments: Right lower extremity erythema essentially resolved.   Psychiatric:         Behavior: Behavior normal.         Thought Content: Thought content normal.       Results Review:  I have reviewed the labs, radiology results, and diagnostic studies.    Laboratory Data:   Results from last 7 days   Lab Units 05/28/23  0654 05/24/23  0511 05/23/23 2212   SODIUM mmol/L 136 138  --    POTASSIUM mmol/L 4.0 4.3 3.8   CHLORIDE mmol/L 98 95*  --    CO2 mmol/L 28.0 31.0*  --    BUN mg/dL 12 10  --    CREATININE mg/dL 0.92 0.96  --    GLUCOSE mg/dL 295* 244*  --    CALCIUM mg/dL 9.3 9.2  --    ANION GAP mmol/L 10.0 12.0  --      Estimated Creatinine Clearance: 88.3 mL/min (by C-G formula based on SCr of 0.92 mg/dL).  Results from last 7 days   Lab Units 05/23/23  2212 05/23/23  1706   MAGNESIUM mg/dL 1.7 1.6         Results from last 7 days   Lab Units 05/30/23  0639 05/29/23  0525 05/28/23  0653 05/27/23  0504 05/26/23  0543   WBC 10*3/mm3 11.11* 8.86 11.87* 11.53* 12.86*   HEMOGLOBIN g/dL 12.1 11.4* 11.6* 11.2* 11.4*   HEMATOCRIT % 37.4 35.0 35.7 34.0 35.4   PLATELETS 10*3/mm3 520* 525* 488* 554* 568*           Culture Data:   No results found for: BLOODCX  No results found for: URINECX  No results found for: RESPCX  No results found for: WOUNDCX  No results found for: STOOLCX  No components found for: BODYFLD    Radiology Data:   Imaging Results (Last 24 Hours)     ** No results found for the last 24 hours. **          I have utilized all available immediate resources to obtain, update, or review  the patient's current medications (including all prescriptions, over-the-counter products, herbals, cannabis/cannabidiol products, and vitamin/mineral/dietary (nutritional) supplements).       Assessment/Plan     Active Hospital Problems    Diagnosis    • **Sepsis without acute organ dysfunction, due to unspecified organism    • Urinary retention        Plan:  1.  Right lower extremity cellulitis: Improving nicely.  Completed antibiotic course.  Wound care following.  2.  Urinary retention: Appreciate urology help.  Urinary retention is resolved   3.  Hypertension: Well-controlled.  4.  Coronary artery disease: Continue home medication.  5.  Diabetes mellitus: Glucose still not optimally controlled.  Levemir increased yesterday.  Monitor.  Some improvement.    6.  Deconditioning: Continue PT/OT.  Patient is doing some better, but still feels that she needs rehab prior to home.  7.  DVT prophylaxis: Heparin.      Medical Decision Making  Number and Complexity of problems: Several highly complex medical problems    Conditions and Status:        Condition is improving.       Discussed with: Patient and      Treatment Plan  As above    Care Planning  Shared decision making: Patient in agreement with plan of care  Code status and discussions: Full code    Disposition  Social Determinants of Health that impact treatment or disposition: None  I expect the patient to be discharged to home in 1-2 days.       The patient was evaluated during the global COVID-19 pandemic, and the diagnosis was suspected/considered upon their initial presentation.  Evaluation, treatment, and testing were consistent with current guidelines for patients who present with complaints or symptoms that may be related to COVID-19.    I confirmed that the patient's Advance Care Plan is present, code status is documented, or surrogate decision maker is listed in the patient's medical record.        This document has been electronically signed by  Mahin Esteves MD on May 30, 2023 15:07 CDT

## 2023-05-30 NOTE — PLAN OF CARE
Goal Outcome Evaluation:  Plan of Care Reviewed With: patient        Progress: improving  Outcome Evaluation: pt recvd B12 injection/ glucose managed/ vss  brought in prosthetic pt up with therapy

## 2023-05-30 NOTE — PLAN OF CARE
Problem: Adult Inpatient Plan of Care  Goal: Plan of Care Review  Recent Flowsheet Documentation  Taken 5/30/2023 1450 by Aure Villaseñor PT  Plan of Care Reviewed With: patient  Outcome Evaluation: PT recertification completed. Pt in fowlers, awake, alert,oriented x4 and agreeable to therapy. Patient did request not to zoë prosthesis again as LLE still sore from having prosthesis on ealier during OT session. ROM WFL all extremities and strength BLE's grossly 4/5. Pt rolled with use of bed rails with modified independence and performed supine to sit to supine with modified independence with HOB elevated and use of bedrail. Pt sat EOB 15 minutes with SBA and transferred sit to stand to sit with FWW and CGA. Pt stood ~ 1.5 minutes without LLE prosthesis donned. Pt deferred taking steps as she was not comfortable taking steps without LLE prosthesis. Pt has previously met bed mobility goal and partially met transfer goal. Patient progressing toward OOB/exercise goal and will benefit from continued PT to progress strength, balance, and tolerance to regain independence with functional mobility and to decrease fall risk. Anticipate patient will benefit from additional PT prior to return home as patient's spouse recovering from recent abdominal surgery and may not be able to provide the care he was able prior to his surgery. Patient able to tolerate 3 hours of therapy and would be good candidate for IRF/ARU for rehab to home.   Goal Outcome Evaluation:  Plan of Care Reviewed With: patient           Outcome Evaluation: PT recertification completed. Pt in fowlers, awake, alert,oriented x4 and agreeable to therapy. Patient did request not to zoë prosthesis again as LLE still sore from having prosthesis on ealier during OT session. ROM WFL all extremities and strength BLE's grossly 4/5. Pt rolled with use of bed rails with modified independence and performed supine to sit to supine with modified independence with HOB  elevated and use of bedrail. Pt sat EOB 15 minutes with SBA and transferred sit to stand to sit with FWW and CGA. Pt stood ~ 1.5 minutes without LLE prosthesis donned. Pt deferred taking steps as she was not comfortable taking steps without LLE prosthesis. Pt has previously met bed mobility goal and partially met transfer goal. Patient progressing toward OOB/exercise goal and will benefit from continued PT to progress strength, balance, and tolerance to regain independence with functional mobility and to decrease fall risk. Anticipate patient will benefit from additional PT prior to return home as patient's spouse recovering from recent abdominal surgery and may not be able to provide the care he was able prior to his surgery. Patient able to tolerate 3 hours of therapy and would be good candidate for IRF/ARU for rehab to home.      PT Evaluation Complexity  History, PT Evaluation Complexity: 3 or more personal factors and/or comorbidities  Examination of Body Systems (PT Eval Complexity): total of 3 or more elements  Clinical Presentation (PT Evaluation Complexity): evolving  Clinical Decision Making (PT Evaluation Complexity): moderate complexity  Overall Complexity (PT Evaluation Complexity): moderate complexity

## 2023-05-30 NOTE — PLAN OF CARE
Goal Outcome Evaluation:               Vss patient c/o pain and medicated 1 time this shift per orders. No acute events and appears to be resting at this time.

## 2023-05-30 NOTE — THERAPY PROGRESS REPORT/RE-CERT
Patient Name: Ariana Martinez  : 1960    MRN: [id number]                              Today's Date: 2023       Admit Date: 2023    Visit Dx:     ICD-10-CM ICD-9-CM   1. Sepsis without acute organ dysfunction, due to unspecified organism  A41.9 038.9     995.91   2. Cellulitis of right lower extremity  L03.115 682.6   3. Type 2 diabetes mellitus with hyperglycemia, unspecified whether long term insulin use  E11.65 250.00   4. Impaired mobility and activities of daily living  Z74.09 V49.89    Z78.9    5. Impaired physical mobility  Z74.09 781.99   6. Impaired mobility and ADLs  Z74.09 V49.89    Z78.9    7. Urinary retention  R33.9 788.20     Patient Active Problem List   Diagnosis   • 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, with long-term current use of insulin   • Closed nondisplaced fracture of fifth left metatarsal bone   • MAURICIO (generalized anxiety disorder)   • Depression, major, recurrent, moderate (HCC)   • GERD without esophagitis   • Long term prescription opiate use   • Mixed hyperlipidemia   • Vitamin D deficiency   • Seasonal allergic rhinitis   • Restrictive lung disease secondary to obesity   • Adult body mass index 37.0-37.9   • Snoring   • Class 3 severe obesity due to excess calories without serious comorbidity with body mass index (BMI) of 40.0 to 44.9 in adult (HCC)   • (HFpEF) heart failure with preserved ejection fraction   • Type 2 diabetes mellitus with hyperglycemia, with long-term current use of insulin ([facility])   • Cyanocobalamin deficiency   • Coronary artery disease of native artery of native heart with stable angina pectoris   • Hypertension   • Meniere's disease   • Gastroparesis   • Pulmonary hypertension ([facility])   • Pes cavus   • Primary osteoarthritis involving multiple joints   • Generalized anxiety disorder   • Chronic right-sided low back pain with right-sided sciatica   • Chest pain   • Adverse effect of iron   • Chest pain due to myocardial  ischemia   • Nonrheumatic tricuspid valve regurgitation   • Iron deficiency anemia due to chronic blood loss   • Malaise and fatigue   • Ankle arthritis   • Urinary retention   • Endocarditis   • Essential hypertension   • Occlusion and stenosis of bilateral carotid arteries   • S/P BKA (below knee amputation) unilateral, left (HCC)   • Phantom pain after amputation of lower extremity   • S/P CABG (coronary artery bypass graft)   • Severe malnutrition   • TIA (transient ischemic attack)   • Coronary artery abnormality   • Elevated d-dimer   • Neuropathy   • Chest pain, unspecified type   • Acute pain of right shoulder   • Rotator cuff syndrome, right   • Acquired hypothyroidism   • Bilateral leg edema   • Left elbow pain   • Gastritis   • Olecranon bursitis, left elbow   • Sepsis without acute organ dysfunction, due to unspecified organism     Past Medical History:   Diagnosis Date   • Acute bacterial endocarditis 3/13/2021   • Angina, class IV    • Anxiety    • Anxiety and depression    • Arthritis    • Benign paroxysmal positional vertigo    • Bladder disorder     has bladder stimulator   • Carpal tunnel syndrome    • CHF (congestive heart failure)    • Chronic pain    • Coronary atherosclerosis     hx CABG 2005.  is followed by Dr Kwon   • Depression    • Diabetes mellitus     Type 2, controlled   • Diabetic polyneuropathy    • Disease of thyroid gland    • Elevated cholesterol    • Female stress incontinence    • Foot pain, left    • Full dentures    • Gastroparesis    • GERD (gastroesophageal reflux disease)    • Hyperlipidemia    • Hypertension    • Low back pain    • Malaise and fatigue    • Multiple joint pain    • Obesity     Refuses to be weighed   • Occlusion and stenosis of bilateral carotid arteries 6/18/2021   • Otalgia     Both   • Perforation of tympanic membrane     Left   • Postoperative wound infection    • Vitamin D deficiency    • Wears glasses     reading     Past Surgical History:    Procedure Laterality Date   • ABDOMINAL SURGERY     • AMPUTATION FOOT     • ANGIOPLASTY      coronary   • ANKLE ARTHROSCOPY Left 02/26/2021    Procedure: Left foot hardware removal, ankle arthroscopy, bone grafting , left foot exostectomy;  Surgeon: Ignacio Lord DPM;  Location: [facility] OR;  Service: Podiatry;  Laterality: Left;   • BREAST BIOPSY Right    • CARDIAC CATHETERIZATION     • CARDIAC CATHETERIZATION N/A 06/20/2017    Procedure: Right Heart Cath;  Surgeon: Can Kwon MD PhD;  Location: [facility] CATH INVASIVE LOCATION;  Service:    • CARDIAC CATHETERIZATION N/A 02/18/2020    Procedure: Left Heart Cath;  Surgeon: Catalina Cooper MD;  Location: [facility] CATH INVASIVE LOCATION;  Service: Cardiology;  Laterality: N/A;   • CARDIAC CATHETERIZATION N/A 04/06/2022    Procedure: Left Heart Cath;  Surgeon: Sheryl Navas MD;  Location: [facility] CATH INVASIVE LOCATION;  Service: Cardiology;  Laterality: N/A;   • CARPAL TUNNEL RELEASE     • CHOLECYSTECTOMY     • COLONOSCOPY N/A 06/24/2020    Procedure: COLONOSCOPY;  Surgeon: Julián Maldonado MD;  Location: [facility] ENDOSCOPY;  Service: Gastroenterology;  Laterality: N/A;   • CORONARY ARTERY BYPASS GRAFT  2005   • ENDOSCOPY N/A 10/19/2018    Procedure: ESOPHAGOGASTRODUODENOSCOPY possible dilation;  Surgeon: Julián Maldonado MD;  Location: [facility] ENDOSCOPY;  Service: Gastroenterology   • ENDOSCOPY N/A 06/24/2020    Procedure: ESOPHAGOGASTRODUODENOSCOPY WED appt please;  Surgeon: Julián Maldonado MD;  Location: [facility] ENDOSCOPY;  Service: Gastroenterology;  Laterality: N/A;   • ENDOSCOPY N/A 06/10/2022    Procedure: ESOPHAGOGASTRODUODENOSCOPY   room 380;  Surgeon: Jeremiah Wilkins MD;  Location: [facility] ENDOSCOPY;  Service: Gastroenterology;  Laterality: N/A;   • ENDOSCOPY N/A 1/10/2023    Procedure: ESOPHAGOGASTRODUODENOSCOPY;  Surgeon: Jeremiah Wilkins MD;  Location: [facility] ENDOSCOPY;  Service: Gastroenterology;  Laterality: N/A;   • ENDOSCOPY AND  COLONOSCOPY     • FOOT SURGERY      Toes   • FOOT SURGERY     • GASTRIC BANDING      Revision, laparoscopic   • HYSTERECTOMY     • INCISION AND DRAINAGE LEG Left 03/12/2021    Procedure: Left ankle arthroscopic irrigation and debridement, screw removal;  Surgeon: Ignacio Lord DPM;  Location: [facility];  Service: Podiatry;  Laterality: Left;   • MOUTH SURGERY     • SALPINGO OOPHORECTOMY     • SHOULDER SURGERY     • SUBTALAR ARTHRODESIS Left 01/16/2019    Procedure: LEFT FOOT HARDWARE REMOVAL, FIFTH METATARSAL , OPEN REDUCTION INTERNAL FIXATION, CALCANEAL OSTEOTOMY;  Surgeon: Ignacio Lord DPM;  Location: [facility];  Service: Podiatry   • SUBTALAR ARTHRODESIS Left 10/16/2019    Procedure: foot hardware removal, subtalar joint fusion  possible de/reattachment of achilles tendon        (c-arm);  Surgeon: Ignacio Lord DPM;  Location: [facility];  Service: Podiatry   • SUBTALAR ARTHRODESIS Left 09/30/2020    Procedure: subtalar, talonavicular joint arthrodesis.  Removal hardware.          (c-arm);  Surgeon: Ignacio Lord DPM;  Location: [facility];  Service: Podiatry;  Laterality: Left;   • TRANSESOPHAGEAL ECHOCARDIOGRAM (LAMONTE)      With color flow      General Information     Row Name 05/30/23 1450          Physical Therapy Time and Intention    Document Type progress note/recertification  -     Mode of Treatment individual therapy;physical therapy  -     Row Name 05/30/23 1450          General Information    Patient Profile Reviewed yes  -     Existing Precautions/Restrictions fall  -     Row Name 05/30/23 1450          Cognition    Orientation Status (Cognition) oriented x 4  -     Row Name 05/30/23 1450          Safety Issues, Functional Mobility    Impairments Affecting Function (Mobility) endurance/activity tolerance;pain;strength;balance  -           User Key  (r) = Recorded By, (t) = Taken By, (c) = Cosigned By    Initials Name Provider Type    Aure Shah PT Physical  Therapist               Mobility     Row Name 05/30/23 1450          Bed Mobility    Bed Mobility supine-sit;sit-supine  -AME     Rolling Left Raymond (Bed Mobility) modified independence  -AME     Rolling Right Raymond (Bed Mobility) modified independence  -AME     Supine-Sit Raymond (Bed Mobility) modified independence  -AME     Sit-Supine Raymond (Bed Mobility) modified independence  -AME     Assistive Device (Bed Mobility) bed rails;head of bed elevated  -AME     Comment, (Bed Mobility) Pt sat EOB 15 minutes with SBA  -     Row Name 05/30/23 1450          Transfers    Comment, (Transfers) sit to stand with FWW with CGA;pt deferred taking steps and transferring to chair as she had been in chair this am after working with OT. Pt did not want to zoë prosthesis again as LLE sore from prosthesis being on earlier.  -AME     Row Name 05/30/23 1450          Sit-Stand Transfer    Sit-Stand Raymond (Transfers) contact guard  -     Assistive Device (Sit-Stand Transfers) walker, front-wheeled  -     Row Name 05/30/23 1450          Gait/Stairs (Locomotion)    Raymond Level (Gait) unable to assess  -           User Key  (r) = Recorded By, (t) = Taken By, (c) = Cosigned By    Initials Name Provider Type    AME Aure Villaseñor PT Physical Therapist               Obj/Interventions     Row Name 05/30/23 1450          Range of Motion Comprehensive    Comment, General Range of Motion ROM WFL all extremities  -     Row Name 05/30/23 1450          Strength Comprehensive (MMT)    Comment, General Manual Muscle Testing (MMT) Assessment BLE: RLE: 4/5 grossly LLE: 4/5 knee, hip  -     Row Name 05/30/23 1450          Balance    Balance Assessment sit to stand dynamic balance;standing static balance  -     Static Sitting Balance independent  -     Dynamic Sitting Balance standby assist  -     Position, Sitting Balance sitting edge of bed  -     Sit to Stand Dynamic Balance contact guard  -      Static Standing Balance contact guard  -     Position/Device Used, Standing Balance walker, front-wheeled  -AME     Row Name 05/30/23 1450          Sensory Assessment (Somatosensory)    Sensory Assessment (Somatosensory) left LE;other (see comments)  Pt reports numbness RLE below knee  -           User Key  (r) = Recorded By, (t) = Taken By, (c) = Cosigned By    Initials Name Provider Type    AME Aure Villaseñor, PT Physical Therapist               Goals/Plan     Row Name 05/30/23 1450          Bed Mobility Goal 1 (PT)    Activity/Assistive Device (Bed Mobility Goal 1, PT) bed mobility activities, all  -AME     Faulk Level/Cues Needed (Bed Mobility Goal 1, PT) standby assist;independent  -AME     Time Frame (Bed Mobility Goal 1, PT) by discharge  -AME     Progress/Outcomes (Bed Mobility Goal 1, PT) goal met  -     Row Name 05/30/23 1450          Transfer Goal 1 (PT)    Activity/Assistive Device (Transfer Goal 1, PT) sit-to-stand/stand-to-sit;bed-to-chair/chair-to-bed  -AME     Faulk Level/Cues Needed (Transfer Goal 1, PT) contact guard required;standby assist;modified independence  -AME     Time Frame (Transfer Goal 1, PT) by discharge  -AME     Progress/Outcome (Transfer Goal 1, PT) goal partially met;goal ongoing  partially met with CGA with sit to stand to sit  -     Row Name 05/30/23 1450          Gait Training Goal 1 (PT)    Activity/Assistive Device (Gait Training Goal 1, PT) gait (walking locomotion);decrease fall risk  -AME     Faulk Level (Gait Training Goal 1, PT) contact guard required;standby assist;modified independence  -AME     Distance (Gait Training Goal 1, PT) 50ft or more each trip  -AME     Time Frame (Gait Training Goal 1, PT) 1 week  -AME     Progress/Outcome (Gait Training Goal 1, PT) goal not met  -     Row Name 05/30/23 1450          ROM Goal 1 (PT)    ROM Goal 1 (PT) Pt will tolerate LE exercises OOB in chair with VSS  -AME     Time Frame (ROM Goal 1, PT) by discharge  -AME      Progress/Outcome (ROM Goal 1, PT) goal not met;continuing progress toward goal  -     Row Name 05/30/23 0983          Therapy Assessment/Plan (PT)    Planned Therapy Interventions (PT) balance training;bed mobility training;gait training;home exercise program;patient/family education;orthotic fitting/training;strengthening;transfer training;wheelchair management/propulsion training  -           User Key  (r) = Recorded By, (t) = Taken By, (c) = Cosigned By    Initials Name Provider Type    Aure Shah PT Physical Therapist               Clinical Impression     Row Name 05/30/23 3188          Pain    Pretreatment Pain Rating 7/10  -     Posttreatment Pain Rating 6/10  -     Pain Location - Side/Orientation Left  -     Pain Location lower  -     Pain Location - extremity  -     Pre/Posttreatment Pain Comment Pt reports LLE sore from having prosthesis on earlier(no redness or skin breakdown noted):pt reports the prosthesis in new and not broken in yet  -     Pain Intervention(s) Rest;Repositioned  -     Additional Documentation Pain Scale: Numbers Pre/Post-Treatment (Group)  -     Row Name 05/30/23 8834          Plan of Care Review    Plan of Care Reviewed With patient  -AME     Outcome Evaluation PT recertification completed. Pt in fowlers, awake, alert,oriented x4 and agreeable to therapy. Patient did request not to zoë prosthesis again as LLE still sore from having prosthesis on ealier during OT session. ROM WFL all extremities and strength BLE's grossly 4/5. Pt rolled with use of bed rails with modified independence and performed supine to sit to supine with modified independence with HOB elevated and use of bedrail. Pt sat EOB 15 minutes with SBA and transferred sit to stand to sit with FWW and CGA. Pt stood ~ 1.5 minutes without LLE prosthesis donned. Pt deferred taking steps as she was not comfortable taking steps without LLE prosthesis. Pt has previously met bed mobility goal and  partially met transfer goal. Patient progressing toward OOB/exercise goal and will benefit from continued PT to progress strength, balance, and tolerance to regain independence with functional mobility and to decrease fall risk. Anticipate patient will benefit from additional PT prior to return home as patient's spouse recovering from recent abdominal surgery and may not be able to provide the care he was able prior to his surgery. Patient able to tolerate 3 hours of therapy and would be good candidate for IRF/ARU for rehab to home.  -     Row Name 05/30/23 1450          Therapy Assessment/Plan (PT)    Patient/Family Therapy Goals Statement (PT) be stronger; improve mobility  -     Rehab Potential (PT) good, to achieve stated therapy goals  -     Criteria for Skilled Interventions Met (PT) yes;meets criteria;skilled treatment is necessary  -     Therapy Frequency (PT) other (see comments)  5-7 days/wk  -     Predicted Duration of Therapy Intervention (PT) until discharge or goals met  -     Row Name 05/30/23 1452          Vital Signs    Pre Systolic BP Rehab 106  -AME     Pre Treatment Diastolic BP 51  -AME     Post Systolic BP Rehab 148  -AME     Post Treatment Diastolic BP 75  -AME     Pretreatment Heart Rate (beats/min) 74  -AME     Posttreatment Heart Rate (beats/min) 80  -AME     Pre SpO2 (%) 92  -AME     O2 Delivery Pre Treatment room air  -AME     Intra SpO2 (%) 97  -AME     O2 Delivery Intra Treatment room air  -AME     Post SpO2 (%) 98  -AME     O2 Delivery Post Treatment room air  -AME     Pre Patient Position Supine  -AME     Intra Patient Position Sitting  -AME     Post Patient Position Supine  -AME     Row Name 05/30/23 1454          Positioning and Restraints    Pre-Treatment Position in bed  -AME     Post Treatment Position bed  -AME     In Bed fowlers;call light within reach;encouraged to call for assist;exit alarm on  -           User Key  (r) = Recorded By, (t) = Taken By, (c) = Cosigned By     Initials Name Provider Type    Aure Shah, PT Physical Therapist               Outcome Measures     Row Name 05/30/23 1450 05/30/23 0750       How much help from another person do you currently need...    Turning from your back to your side while in flat bed without using bedrails? 4  -AME 3  -TH    Moving from lying on back to sitting on the side of a flat bed without bedrails? 3  -AME 3  -TH    Moving to and from a bed to a chair (including a wheelchair)? 3  -AME 3  -TH    Standing up from a chair using your arms (e.g., wheelchair, bedside chair)? 3  -AME 3  -TH    Climbing 3-5 steps with a railing? 1  -AME 1  -TH    To walk in hospital room? 2  - 1  -TH    AM-PAC 6 Clicks Score (PT) 16  - 14  -TH    Highest level of mobility 5 --> Static standing  - 4 --> Transferred to chair/commode  -TH    Row Name 05/30/23 1450 05/30/23 1002       Functional Assessment    Outcome Measure Options AM-PAC 6 Clicks Basic Mobility (PT)  - AM-PAC 6 Clicks Daily Activity (OT)  -          User Key  (r) = Recorded By, (t) = Taken By, (c) = Cosigned By    Initials Name Provider Type    Aure Shah, PT Physical Therapist    Dana Machuca OT Occupational Therapist    Daphne Andrade RN Registered Nurse                             Physical Therapy Education     Title: PT OT SLP Therapies (In Progress)     Topic: Physical Therapy (In Progress)     Point: Mobility training (In Progress)     Learning Progress Summary           Patient Acceptance, E, NR by AME at 5/29/2023 1123    Acceptance, E, NR by AME at 5/26/2023 1356    Acceptance, E,TB, VU by LW at 5/25/2023 1459    Acceptance, E, NR by AME at 5/24/2023 1306    Acceptance, E,TB, VU by NB at 5/18/2023 2138    Acceptance, E, NR by AME1 at 5/18/2023 1407                   Point: Home exercise program (Done)     Learning Progress Summary           Patient Acceptance, E, VU by AB at 5/30/2023 0516    Acceptance, E,TB, VU by LW at 5/25/2023 1459                    Point: Body mechanics (Done)     Learning Progress Summary           Patient Acceptance, E, VU by AB at 5/30/2023 0516    Acceptance, E,TB, VU by  at 5/25/2023 1459    Acceptance, E,TB, VU by  at 5/25/2023 1451                   Point: Precautions (Done)     Learning Progress Summary           Patient Acceptance, E, VU by AB at 5/30/2023 0516    Acceptance, E,TB, VU by  at 5/25/2023 1459    Acceptance, E, NR by [facility] at 5/18/2023 1407                               User Key     Initials Effective Dates Name Provider Type Discipline    [facility] 06/16/21 -  Aure Villaseñor, PT Physical Therapist PT     06/16/21 -  Ousmane Zhang PTA Physical Therapist Assistant PT     06/16/21 -  Sarah Irby COTA Occupational Therapist Assistant OT    [facility] 06/16/21 -  Samantha Cuellar RN Registered Nurse Nurse     09/08/22 -  Janae Amaya LPN Licensed Nurse Nurse    [facility] 11/07/22 -  Joana Mc LPN Licensed Nurse Nurse              PT Recommendation and Plan  Planned Therapy Interventions (PT): balance training, bed mobility training, gait training, home exercise program, patient/family education, orthotic fitting/training, strengthening, transfer training, wheelchair management/propulsion training  Plan of Care Reviewed With: patient  Outcome Evaluation: PT recertification completed. Pt in fowlers, awake, alert,oriented x4 and agreeable to therapy. Patient did request not to zoë prosthesis again as LLE still sore from having prosthesis on ealier during OT session. ROM WFL all extremities and strength BLE's grossly 4/5. Pt rolled with use of bed rails with modified independence and performed supine to sit to supine with modified independence with HOB elevated and use of bedrail. Pt sat EOB 15 minutes with SBA and transferred sit to stand to sit with FWW and CGA. Pt stood ~ 1.5 minutes without LLE prosthesis donned. Pt deferred taking steps as she was not comfortable taking steps without LLE prosthesis. Pt has  previously met bed mobility goal and partially met transfer goal. Patient progressing toward OOB/exercise goal and will benefit from continued PT to progress strength, balance, and tolerance to regain independence with functional mobility and to decrease fall risk. Anticipate patient will benefit from additional PT prior to return home as patient's spouse recovering from recent abdominal surgery and may not be able to provide the care he was able prior to his surgery. Patient able to tolerate 3 hours of therapy and would be good candidate for IRF/ARU for rehab to home.     Time Calculation:    PT Charges     Row Name 05/30/23 1738             Time Calculation    Start Time 1450  -AME      Stop Time 1548  -AME      Time Calculation (min) 58 min  -AME      PT Received On 05/30/23  -      PT Goal Re-Cert Due Date 06/12/23  -AME         Time Calculation- PT    Total Timed Code Minutes- PT 58 minute(s)  -AME         Timed Charges    39184 - PT Therapeutic Activity Minutes 58  -AME         Total Minutes    Timed Charges Total Minutes 58  -AME       Total Minutes 58  -AME            User Key  (r) = Recorded By, (t) = Taken By, (c) = Cosigned By    Initials Name Provider Type    Aure Shah, PT Physical Therapist              Therapy Charges for Today     Code Description Service Date Service Provider Modifiers Qty    [id number]  PT THERAPEUTIC ACT EA 15 MIN 5/30/2023 Aure Villaseñor, PT GP 4          PT G-Codes  Outcome Measure Options: AM-PAC 6 Clicks Basic Mobility (PT)  AM-PAC 6 Clicks Score (PT): 16  AM-PAC 6 Clicks Score (OT): 21  PT Discharge Summary  Anticipated Discharge Disposition (PT): inpatient rehabilitation facility (rehab to home)    Aure Villaseñor PT  5/30/2023

## 2023-05-31 LAB
BASOPHILS # BLD AUTO: 0.04 10*3/MM3 (ref 0–0.2)
BASOPHILS NFR BLD AUTO: 0.4 % (ref 0–1.5)
DEPRECATED RDW RBC AUTO: 42.4 FL (ref 37–54)
EOSINOPHIL # BLD AUTO: 0.21 10*3/MM3 (ref 0–0.4)
EOSINOPHIL NFR BLD AUTO: 2.3 % (ref 0.3–6.2)
ERYTHROCYTE [DISTWIDTH] IN BLOOD BY AUTOMATED COUNT: 13.7 % (ref 12.3–15.4)
GLUCOSE BLDC GLUCOMTR-MCNC: 241 MG/DL (ref 70–130)
GLUCOSE BLDC GLUCOMTR-MCNC: 259 MG/DL (ref 70–130)
GLUCOSE BLDC GLUCOMTR-MCNC: 313 MG/DL (ref 70–130)
GLUCOSE BLDC GLUCOMTR-MCNC: 342 MG/DL (ref 70–130)
HCT VFR BLD AUTO: 34.7 % (ref 34–46.6)
HGB BLD-MCNC: 11.8 G/DL (ref 12–15.9)
HOLD SPECIMEN: NORMAL
IMM GRANULOCYTES # BLD AUTO: 0.04 10*3/MM3 (ref 0–0.05)
IMM GRANULOCYTES NFR BLD AUTO: 0.4 % (ref 0–0.5)
LYMPHOCYTES # BLD AUTO: 2.06 10*3/MM3 (ref 0.7–3.1)
LYMPHOCYTES NFR BLD AUTO: 22.1 % (ref 19.6–45.3)
MCH RBC QN AUTO: 29 PG (ref 26.6–33)
MCHC RBC AUTO-ENTMCNC: 34 G/DL (ref 31.5–35.7)
MCV RBC AUTO: 85.3 FL (ref 79–97)
MONOCYTES # BLD AUTO: 0.64 10*3/MM3 (ref 0.1–0.9)
MONOCYTES NFR BLD AUTO: 6.9 % (ref 5–12)
NEUTROPHILS NFR BLD AUTO: 6.32 10*3/MM3 (ref 1.7–7)
NEUTROPHILS NFR BLD AUTO: 67.9 % (ref 42.7–76)
NRBC BLD AUTO-RTO: 0 /100 WBC (ref 0–0.2)
PLATELET # BLD AUTO: 416 10*3/MM3 (ref 140–450)
PMV BLD AUTO: 9 FL (ref 6–12)
RBC # BLD AUTO: 4.07 10*6/MM3 (ref 3.77–5.28)
WBC NRBC COR # BLD: 9.31 10*3/MM3 (ref 3.4–10.8)

## 2023-05-31 PROCEDURE — 63710000001 ONDANSETRON PER 8 MG: Performed by: UROLOGY

## 2023-05-31 PROCEDURE — 85025 COMPLETE CBC W/AUTO DIFF WBC: CPT | Performed by: HOSPITALIST

## 2023-05-31 PROCEDURE — 82948 REAGENT STRIP/BLOOD GLUCOSE: CPT

## 2023-05-31 PROCEDURE — 63710000001 INSULIN DETEMIR PER 5 UNITS: Performed by: UROLOGY

## 2023-05-31 PROCEDURE — 25010000002 ONDANSETRON PER 1 MG: Performed by: UROLOGY

## 2023-05-31 PROCEDURE — 97116 GAIT TRAINING THERAPY: CPT

## 2023-05-31 PROCEDURE — 63710000001 INSULIN DETEMIR PER 5 UNITS: Performed by: HOSPITALIST

## 2023-05-31 PROCEDURE — 25010000002 HEPARIN (PORCINE) PER 1000 UNITS: Performed by: UROLOGY

## 2023-05-31 PROCEDURE — 97110 THERAPEUTIC EXERCISES: CPT

## 2023-05-31 PROCEDURE — 97530 THERAPEUTIC ACTIVITIES: CPT

## 2023-05-31 PROCEDURE — 63710000001 INSULIN ASPART PER 5 UNITS: Performed by: UROLOGY

## 2023-05-31 RX ADMIN — ISOSORBIDE MONONITRATE 30 MG: 30 TABLET, EXTENDED RELEASE ORAL at 08:43

## 2023-05-31 RX ADMIN — GABAPENTIN 200 MG: 100 CAPSULE ORAL at 20:39

## 2023-05-31 RX ADMIN — SUCRALFATE 1 G: 1 TABLET ORAL at 08:43

## 2023-05-31 RX ADMIN — HEPARIN SODIUM 5000 UNITS: 5000 INJECTION INTRAVENOUS; SUBCUTANEOUS at 21:09

## 2023-05-31 RX ADMIN — SUCRALFATE 1 G: 1 TABLET ORAL at 18:09

## 2023-05-31 RX ADMIN — INSULIN ASPART 10 UNITS: 100 INJECTION, SOLUTION INTRAVENOUS; SUBCUTANEOUS at 12:57

## 2023-05-31 RX ADMIN — Medication 10 ML: at 20:40

## 2023-05-31 RX ADMIN — Medication 1 APPLICATION: at 20:39

## 2023-05-31 RX ADMIN — INSULIN ASPART 10 UNITS: 100 INJECTION, SOLUTION INTRAVENOUS; SUBCUTANEOUS at 18:09

## 2023-05-31 RX ADMIN — GABAPENTIN 200 MG: 100 CAPSULE ORAL at 08:43

## 2023-05-31 RX ADMIN — ARIPIPRAZOLE 5 MG: 5 TABLET ORAL at 08:45

## 2023-05-31 RX ADMIN — METOCLOPRAMIDE 10 MG: 10 TABLET ORAL at 21:09

## 2023-05-31 RX ADMIN — FUROSEMIDE 40 MG: 40 TABLET ORAL at 08:43

## 2023-05-31 RX ADMIN — SUCRALFATE 1 G: 1 TABLET ORAL at 12:57

## 2023-05-31 RX ADMIN — RANOLAZINE 500 MG: 500 TABLET, FILM COATED, EXTENDED RELEASE ORAL at 20:39

## 2023-05-31 RX ADMIN — HEPARIN SODIUM 5000 UNITS: 5000 INJECTION INTRAVENOUS; SUBCUTANEOUS at 14:58

## 2023-05-31 RX ADMIN — ATORVASTATIN CALCIUM 80 MG: 40 TABLET, FILM COATED ORAL at 08:43

## 2023-05-31 RX ADMIN — INSULIN DETEMIR 30 UNITS: 100 INJECTION, SOLUTION SUBCUTANEOUS at 08:45

## 2023-05-31 RX ADMIN — FOLIC ACID 1000 MCG: 1 TABLET ORAL at 08:43

## 2023-05-31 RX ADMIN — HYDROCHLOROTHIAZIDE 12.5 MG: 12.5 TABLET ORAL at 08:43

## 2023-05-31 RX ADMIN — SUCRALFATE 1 G: 1 TABLET ORAL at 20:39

## 2023-05-31 RX ADMIN — CLOPIDOGREL BISULFATE 75 MG: 75 TABLET ORAL at 08:43

## 2023-05-31 RX ADMIN — ONDANSETRON HYDROCHLORIDE 4 MG: 4 TABLET, FILM COATED ORAL at 18:09

## 2023-05-31 RX ADMIN — VENLAFAXINE HYDROCHLORIDE 75 MG: 75 CAPSULE, EXTENDED RELEASE ORAL at 08:43

## 2023-05-31 RX ADMIN — ONDANSETRON 4 MG: 2 INJECTION INTRAMUSCULAR; INTRAVENOUS at 08:44

## 2023-05-31 RX ADMIN — AMLODIPINE BESYLATE 5 MG: 5 TABLET ORAL at 08:43

## 2023-05-31 RX ADMIN — RANOLAZINE 500 MG: 500 TABLET, FILM COATED, EXTENDED RELEASE ORAL at 08:43

## 2023-05-31 RX ADMIN — Medication 10 ML: at 20:39

## 2023-05-31 RX ADMIN — INSULIN ASPART 5 UNITS: 100 INJECTION, SOLUTION INTRAVENOUS; SUBCUTANEOUS at 07:55

## 2023-05-31 RX ADMIN — ASPIRIN 325 MG: 325 TABLET, COATED ORAL at 08:43

## 2023-05-31 RX ADMIN — INSULIN DETEMIR 45 UNITS: 100 INJECTION, SOLUTION SUBCUTANEOUS at 20:40

## 2023-05-31 RX ADMIN — Medication 10 ML: at 08:44

## 2023-05-31 RX ADMIN — HYDROCODONE BITARTRATE AND ACETAMINOPHEN 1 TABLET: 10; 325 TABLET ORAL at 14:58

## 2023-05-31 RX ADMIN — PANTOPRAZOLE SODIUM 40 MG: 40 TABLET, DELAYED RELEASE ORAL at 08:43

## 2023-05-31 RX ADMIN — Medication 1 APPLICATION: at 08:44

## 2023-05-31 RX ADMIN — LEVOTHYROXINE SODIUM 50 MCG: 50 TABLET ORAL at 06:04

## 2023-05-31 RX ADMIN — ONDANSETRON 4 MG: 2 INJECTION INTRAMUSCULAR; INTRAVENOUS at 03:09

## 2023-05-31 RX ADMIN — HYDROCODONE BITARTRATE AND ACETAMINOPHEN 1 TABLET: 10; 325 TABLET ORAL at 21:09

## 2023-05-31 RX ADMIN — HYDROCODONE BITARTRATE AND ACETAMINOPHEN 1 TABLET: 10; 325 TABLET ORAL at 03:09

## 2023-05-31 RX ADMIN — HEPARIN SODIUM 5000 UNITS: 5000 INJECTION INTRAVENOUS; SUBCUTANEOUS at 05:55

## 2023-05-31 RX ADMIN — TAMSULOSIN HYDROCHLORIDE 0.4 MG: 0.4 CAPSULE ORAL at 08:43

## 2023-05-31 NOTE — PROGRESS NOTES
[facility] Medicine Services  INPATIENT PROGRESS NOTE      Length of Stay: 14  Date of Admission: 5/17/2023  Primary Care Physician: Dolly Foss APRN    Subjective   Chief Complaint: Nausea with eating  HPI:    64yo female pmh significant htn/hyperlipidemia/dm2/hypothyroid/cad/HFpEF/obesity presents ED via EMS c/o 1d hx fever/chills/generalized weakness.  ROS (+) nonproductive cough.  Denies sore throat/rhinorrhea/chest pain/soa/abd pain/n/v/d/dysuria.    Daily assessment 5/31/2023.  On evaluation today the patient is currently lying in bed she appears stable.  Denies any new problems or complaints.  She states she continues to have some nausea with eating.  Zofran usually helps with this problem.  Otherwise she does continue to remain weak.  Continues to work with physical therapy.  Recommended to be discharged to skilled nursing facility versus ARU.  Have discussed case with case management while we will proceed forward based on further recommendations and acceptance    Review of Systems   Respiratory: Negative for shortness of breath.    Cardiovascular: Negative for chest pain.        All pertinent negatives and positives are as above. All other systems have been reviewed and are negative unless otherwise stated.     Objective    As of today 05/31/23  Temp:  [95.7 °F (35.4 °C)-97.8 °F (36.6 °C)] 96.8 °F (36 °C)  Heart Rate:  [71-79] 74  Resp:  [16-18] 16  BP: (106-148)/(51-66) 142/64    Physical Exam  Vitals reviewed.   Constitutional:       Appearance: She is well-developed.   HENT:      Head: Normocephalic and atraumatic.   Eyes:      Pupils: Pupils are equal, round, and reactive to light.   Cardiovascular:      Rate and Rhythm: Normal rate and regular rhythm.      Heart sounds: Normal heart sounds. No murmur heard.    No friction rub. No gallop.   Pulmonary:      Effort: Pulmonary effort is normal. No respiratory distress.      Breath sounds: Normal  breath sounds. No wheezing or rales.   Chest:      Chest wall: No tenderness.   Abdominal:      General: Bowel sounds are normal. There is no distension.      Palpations: Abdomen is soft.      Tenderness: There is no abdominal tenderness. There is no guarding.   Musculoskeletal:      Cervical back: Normal range of motion and neck supple.      Comments: Left BKA   Skin:     General: Skin is warm and dry.      Comments: Right lower extremity erythema improving.  Blister on dorsum of right foot.  Ruptured blister on medial aspect of right distal lower extremity.   Psychiatric:         Behavior: Behavior normal.         Thought Content: Thought content normal.           amLODIPine, 5 mg, Oral, Daily  ARIPiprazole, 5 mg, Oral, Daily  aspirin EC, 325 mg, Oral, Daily  atorvastatin, 80 mg, Oral, Daily  Bag Balm, 1 application, Topical, BID  clopidogrel, 75 mg, Oral, Daily  cyanocobalamin, 1,000 mcg, Intramuscular, Q28 Days  folic acid, 1,000 mcg, Oral, Daily  furosemide, 40 mg, Oral, Daily  gabapentin, 200 mg, Oral, Q12H  heparin (porcine), 5,000 Units, Subcutaneous, Q8H  hydroCHLOROthiazide, 12.5 mg, Oral, Daily  Insulin Aspart, 0-14 Units, Subcutaneous, TID AC  insulin detemir, 30 Units, Subcutaneous, Daily  insulin detemir, 45 Units, Subcutaneous, Nightly  isosorbide mononitrate, 30 mg, Oral, Daily  levothyroxine, 50 mcg, Oral, QAM  pantoprazole, 40 mg, Oral, Daily  ranolazine, 500 mg, Oral, Q12H  senna-docusate sodium, 2 tablet, Oral, BID  sodium chloride, 10 mL, Intravenous, Q12H  sodium chloride, 10 mL, Intravenous, Q12H  sucralfate, 1 g, Oral, 4x Daily  tamsulosin, 0.4 mg, Oral, Daily  venlafaxine XR, 75 mg, Oral, Daily With Breakfast         Results Review:  I have reviewed the labs, radiology results, and diagnostic studies.    Laboratory Data:   Results from last 7 days   Lab Units 05/28/23  0654   SODIUM mmol/L 136   POTASSIUM mmol/L 4.0   CHLORIDE mmol/L 98   CO2 mmol/L 28.0   BUN mg/dL 12   CREATININE mg/dL  0.92   GLUCOSE mg/dL 295*   CALCIUM mg/dL 9.3   ANION GAP mmol/L 10.0     Estimated Creatinine Clearance: 88.3 mL/min (by C-G formula based on SCr of 0.92 mg/dL).          Results from last 7 days   Lab Units 05/31/23  0538 05/30/23  0639 05/29/23  0525 05/28/23  0653 05/27/23  0504   WBC 10*3/mm3 9.31 11.11* 8.86 11.87* 11.53*   HEMOGLOBIN g/dL 11.8* 12.1 11.4* 11.6* 11.2*   HEMATOCRIT % 34.7 37.4 35.0 35.7 34.0   PLATELETS 10*3/mm3 416 520* 525* 488* 554*           Culture Data:   No results found for: BLOODCX  No results found for: URINECX  No results found for: RESPCX  No results found for: WOUNDCX  No results found for: STOOLCX  No components found for: BODYFLD    Radiology Data:   Imaging Results (Last 24 Hours)     ** No results found for the last 24 hours. **          I have reviewed the patient's current medications.     Assessment/Plan     Principal Problem:    Sepsis without acute organ dysfunction, due to unspecified organism  Active Problems:    Urinary retention      1.  Right lower extremity cellulitis:   Improving nicely.    Completed antibiotic course.    Wound care following.    2.  Urinary retention: Appreciate urology help.  Urinary retention is resolved     3.  Hypertension:   Current blood pressure is 142/64.  O2 saturation 97% on room air.  Continue amlodipine, Lasix, hydrochlorothiazide    4.  Coronary artery disease:   Stable continue atorvastatin, Plavix, Imdur, Ranexa    5.  Diabetes mellitus:  Continue Levemir, sliding scale and ADA diet    Monitor.      6.  Deconditioning:   Continue PT/OT.  Patient is doing some better, but still feels that she needs rehab prior to home.     DVT prophylaxis: Heparin.  CODE STATUS full code    Discussed with case management ARU versus skilled nursing facility on discharge   Medical Decision Making  Number and Complexity of problems: Several highly complex medical problems     Conditions and Status:        Condition is improving.        Discussed with:  Patient and      Treatment Plan  As above     Care Planning  Shared decision making: Patient in agreement with plan of care  Code status and discussions: Full code     Disposition  Social Determinants of Health that impact treatment or disposition: None  I expect the patient to be discharged to home in 1-2 days.         The patient was evaluated during the global COVID-19 pandemic, and the diagnosis was suspected/considered upon their initial presentation.  Evaluation, treatment, and testing were consistent with current guidelines for patients who present with complaints or symptoms that may be related to COVID-19.     I confirmed that the patient's Advance Care Plan is present, code status is documented, or surrogate decision maker is listed in the patient's medical record.      Reji Mendoza, DO

## 2023-05-31 NOTE — PLAN OF CARE
Goal Outcome Evaluation:               Vss. Patient had c/o pain and nausea and was medicated per orders. No other acute events this shift. Patient appears to be resting at this time.

## 2023-05-31 NOTE — PLAN OF CARE
Goal Outcome Evaluation:  Plan of Care Reviewed With: patient        Progress: improving  Outcome Evaluation: Pt reports feeling better. Pt agreeable to therapy. Pt able to t/f sup to sit with mod ind. Pt sat EOB and was able to zoë L LE prosthetic with some assistance from this PTA to ensure proper fit. Pt stood with CGA and amb 10ft before sitting in recliner. Pt positioned in recliner and left with all needs met. Pt would cont to benefit from therapy.

## 2023-05-31 NOTE — THERAPY TREATMENT NOTE
Acute Care - Physical Therapy Treatment Note  [facility]     Patient Name: Ariana Martinez  : 1960  MRN: [id number]  Today's Date: 2023      Visit Dx:     ICD-10-CM ICD-9-CM   1. Sepsis without acute organ dysfunction, due to unspecified organism  A41.9 038.9     995.91   2. Cellulitis of right lower extremity  L03.115 682.6   3. Type 2 diabetes mellitus with hyperglycemia, unspecified whether long term insulin use  E11.65 250.00   4. Impaired mobility and activities of daily living  Z74.09 V49.89    Z78.9    5. Impaired physical mobility  Z74.09 781.99   6. Impaired mobility and ADLs  Z74.09 V49.89    Z78.9    7. Urinary retention  R33.9 788.20     Patient Active Problem List   Diagnosis   • 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, with long-term current use of insulin   • Closed nondisplaced fracture of fifth left metatarsal bone   • MAURICIO (generalized anxiety disorder)   • Depression, major, recurrent, moderate ([facility])   • GERD without esophagitis   • Long term prescription opiate use   • Mixed hyperlipidemia   • Vitamin D deficiency   • Seasonal allergic rhinitis   • Restrictive lung disease secondary to obesity   • Adult body mass index 37.0-37.9   • Snoring   • Class 3 severe obesity due to excess calories without serious comorbidity with body mass index (BMI) of 40.0 to 44.9 in adult ([facility])   • (HFpEF) heart failure with preserved ejection fraction   • Type 2 diabetes mellitus with hyperglycemia, with long-term current use of insulin ([facility])   • Cyanocobalamin deficiency   • Coronary artery disease of native artery of native heart with stable angina pectoris   • Hypertension   • Meniere's disease   • Gastroparesis   • Pulmonary hypertension ([facility])   • Pes cavus   • Primary osteoarthritis involving multiple joints   • Generalized anxiety disorder   • Chronic right-sided low back pain with right-sided sciatica   • Chest pain   • Adverse effect of iron   • Chest pain due to  myocardial ischemia   • Nonrheumatic tricuspid valve regurgitation   • Iron deficiency anemia due to chronic blood loss   • Malaise and fatigue   • Ankle arthritis   • Urinary retention   • Endocarditis   • Essential hypertension   • Occlusion and stenosis of bilateral carotid arteries   • S/P BKA (below knee amputation) unilateral, left (HCC)   • Phantom pain after amputation of lower extremity   • S/P CABG (coronary artery bypass graft)   • Severe malnutrition   • TIA (transient ischemic attack)   • Coronary artery abnormality   • Elevated d-dimer   • Neuropathy   • Chest pain, unspecified type   • Acute pain of right shoulder   • Rotator cuff syndrome, right   • Acquired hypothyroidism   • Bilateral leg edema   • Left elbow pain   • Gastritis   • Olecranon bursitis, left elbow   • Sepsis without acute organ dysfunction, due to unspecified organism     Past Medical History:   Diagnosis Date   • Acute bacterial endocarditis 3/13/2021   • Angina, class IV    • Anxiety    • Anxiety and depression    • Arthritis    • Benign paroxysmal positional vertigo    • Bladder disorder     has bladder stimulator   • Carpal tunnel syndrome    • CHF (congestive heart failure)    • Chronic pain    • Coronary atherosclerosis     hx CABG 2005.  is followed by Dr Kwon   • Depression    • Diabetes mellitus     Type 2, controlled   • Diabetic polyneuropathy    • Disease of thyroid gland    • Elevated cholesterol    • Female stress incontinence    • Foot pain, left    • Full dentures    • Gastroparesis    • GERD (gastroesophageal reflux disease)    • Hyperlipidemia    • Hypertension    • Low back pain    • Malaise and fatigue    • Multiple joint pain    • Obesity     Refuses to be weighed   • Occlusion and stenosis of bilateral carotid arteries 6/18/2021   • Otalgia     Both   • Perforation of tympanic membrane     Left   • Postoperative wound infection    • Vitamin D deficiency    • Wears glasses     reading     Past Surgical  History:   Procedure Laterality Date   • ABDOMINAL SURGERY     • AMPUTATION FOOT     • ANGIOPLASTY      coronary   • ANKLE ARTHROSCOPY Left 02/26/2021    Procedure: Left foot hardware removal, ankle arthroscopy, bone grafting , left foot exostectomy;  Surgeon: Ignacio Lord DPM;  Location: [facility] OR;  Service: Podiatry;  Laterality: Left;   • BREAST BIOPSY Right    • CARDIAC CATHETERIZATION     • CARDIAC CATHETERIZATION N/A 06/20/2017    Procedure: Right Heart Cath;  Surgeon: Can Kwon MD PhD;  Location: [facility] CATH INVASIVE LOCATION;  Service:    • CARDIAC CATHETERIZATION N/A 02/18/2020    Procedure: Left Heart Cath;  Surgeon: Catalina Cooper MD;  Location: [facility] CATH INVASIVE LOCATION;  Service: Cardiology;  Laterality: N/A;   • CARDIAC CATHETERIZATION N/A 04/06/2022    Procedure: Left Heart Cath;  Surgeon: Sheryl Navas MD;  Location: [facility] CATH INVASIVE LOCATION;  Service: Cardiology;  Laterality: N/A;   • CARPAL TUNNEL RELEASE     • CHOLECYSTECTOMY     • COLONOSCOPY N/A 06/24/2020    Procedure: COLONOSCOPY;  Surgeon: Julián Maldonado MD;  Location: [facility] ENDOSCOPY;  Service: Gastroenterology;  Laterality: N/A;   • CORONARY ARTERY BYPASS GRAFT  2005   • ENDOSCOPY N/A 10/19/2018    Procedure: ESOPHAGOGASTRODUODENOSCOPY possible dilation;  Surgeon: Julián Maldonado MD;  Location: [facility] ENDOSCOPY;  Service: Gastroenterology   • ENDOSCOPY N/A 06/24/2020    Procedure: ESOPHAGOGASTRODUODENOSCOPY WED appt please;  Surgeon: Julián Maldonado MD;  Location: [facility] ENDOSCOPY;  Service: Gastroenterology;  Laterality: N/A;   • ENDOSCOPY N/A 06/10/2022    Procedure: ESOPHAGOGASTRODUODENOSCOPY   room 380;  Surgeon: Jeremiah Wilkins MD;  Location: [facility] ENDOSCOPY;  Service: Gastroenterology;  Laterality: N/A;   • ENDOSCOPY N/A 1/10/2023    Procedure: ESOPHAGOGASTRODUODENOSCOPY;  Surgeon: Jeremiah Wilkins MD;  Location: [facility] ENDOSCOPY;  Service: Gastroenterology;  Laterality: N/A;   •  ENDOSCOPY AND COLONOSCOPY     • FOOT SURGERY      Toes   • FOOT SURGERY     • GASTRIC BANDING      Revision, laparoscopic   • HYSTERECTOMY     • INCISION AND DRAINAGE LEG Left 03/12/2021    Procedure: Left ankle arthroscopic irrigation and debridement, screw removal;  Surgeon: Ignacio Lord DPM;  Location: [facility];  Service: Podiatry;  Laterality: Left;   • MOUTH SURGERY     • SALPINGO OOPHORECTOMY     • SHOULDER SURGERY     • SUBTALAR ARTHRODESIS Left 01/16/2019    Procedure: LEFT FOOT HARDWARE REMOVAL, FIFTH METATARSAL , OPEN REDUCTION INTERNAL FIXATION, CALCANEAL OSTEOTOMY;  Surgeon: Ignacio Lord DPM;  Location: [facility];  Service: Podiatry   • SUBTALAR ARTHRODESIS Left 10/16/2019    Procedure: foot hardware removal, subtalar joint fusion  possible de/reattachment of achilles tendon        (c-arm);  Surgeon: Ignacio Lord DPM;  Location: [facility];  Service: Podiatry   • SUBTALAR ARTHRODESIS Left 09/30/2020    Procedure: subtalar, talonavicular joint arthrodesis.  Removal hardware.          (c-arm);  Surgeon: Ignacio Lord DPM;  Location: [facility];  Service: Podiatry;  Laterality: Left;   • TRANSESOPHAGEAL ECHOCARDIOGRAM (LAMONTE)      With color flow     PT Assessment (last 12 hours)     PT Evaluation and Treatment     Row Name 05/31/23 0854          Physical Therapy Time and Intention    Subjective Information complains of;pain  -TW     Document Type therapy note (daily note)  -TW     Mode of Treatment physical therapy;individual therapy  -TW     Patient Effort good  -TW     Row Name 05/31/23 0893          General Information    Patient Profile Reviewed yes  -TW     Patient Observations alert;cooperative;agree to therapy  -TW     Patient/Family/Caregiver Comments/Observations none  -TW     General Observations of Patient Pt sup in bed.  -TW     Existing Precautions/Restrictions fall  -TW     Row Name 05/31/23 0854          Cognition    Affect/Mental Status (Cognition) WFL  -TW      Orientation Status (Cognition) oriented x 4  -TW     Follows Commands (Cognition) WNL  -TW     Personal Safety Interventions fall prevention program maintained;gait belt;nonskid shoes/slippers when out of bed;muscle strengthening facilitated  -TW     Row Name 05/31/23 0854          Bed Mobility    Supine-Sit [place] (Bed Mobility) modified independence  -TW     Sit-Supine [place] (Bed Mobility) not tested  -TW     Row Name 05/31/23 0854          Sit-Stand Transfer    Sit-Stand [place] (Transfers) contact guard  -TW     Assistive Device (Sit-Stand Transfers) walker, front-wheeled  -TW     Row Name 05/31/23 0854          Stand-Sit Transfer    Stand-Sit [place] (Transfers) contact guard  -TW     Assistive Device (Stand-Sit Transfers) walker, front-wheeled  -TW     Row Name 05/31/23 0854          Gait/Stairs (Locomotion)    [place] Level (Gait) contact guard  -TW     Assistive Device (Gait) walker, front-wheeled  -TW     Distance in Feet (Gait) 10ft with L LE prosthetic donned.  -TW     Row Name             Wound 05/17/23 1828 Right anterior fifth toe    Wound - Properties Group Placement Date: 05/17/23  - Placement Time: 1828 -JH Side: Right  -JH Orientation: anterior  -JH Location: fifth toe  -JH    Retired Wound - Properties Group Placement Date: 05/17/23  - Placement Time: 1828 -JH Side: Right  -JH Orientation: anterior  -JH Location: fifth toe  -JH    Retired Wound - Properties Group Date first assessed: 05/17/23  - Time first assessed: 1828  -JH Side: Right  -JH Location: fifth toe  -JH    Row Name             Wound 05/23/23 1006 Right distal leg    Wound - Properties Group Placement Date: 05/23/23  - Placement Time: 1006  -LH Present on Hospital Admission: N  -LH Side: Right  -LH Orientation: distal  -LH Location: leg  -LH    Retired Wound - Properties Group Placement Date: 05/23/23  - Placement Time: 1006  -LH Present on Hospital Admission: N  -LH Side: Right  -LH  Orientation: distal  -LH Location: leg  -LH    Retired Wound - Properties Group Date first assessed: 05/23/23  -LH Time first assessed: 1006  -LH Present on Hospital Admission: N  -LH Side: Right  -LH Location: leg  -LH    Row Name 05/31/23 0854          Plan of Care Review    Plan of Care Reviewed With patient  -TW     Progress improving  -TW     Outcome Evaluation Pt reports feeling better. Pt agreeable to therapy. Pt able to t/f sup to sit with mod ind. Pt sat EOB and was able to zoë L LE prosthetic with some assistance from this PTA to ensure proper fit. Pt stood with CGA and amb 10ft before sitting in recliner. Pt positioned in recliner and left with all needs met. Pt would cont to benefit from therapy.  -TW     Row Name 05/31/23 0854          Vital Signs    Pretreatment Heart Rate (beats/min) 88  -TW     Posttreatment Heart Rate (beats/min) 82  -TW     Pre SpO2 (%) 93  -TW     O2 Delivery Pre Treatment room air  -TW     Post SpO2 (%) 94  -TW     O2 Delivery Post Treatment room air  -TW     Pre Patient Position Supine  -TW     Intra Patient Position Standing  -TW     Post Patient Position Sitting  -TW     Row Name 05/31/23 0854          Positioning and Restraints    Pre-Treatment Position in bed  -TW     Post Treatment Position chair  -TW     In Chair reclined;call light within reach;encouraged to call for assist;exit alarm on  -TW     Row Name 05/31/23 0854          Therapy Assessment/Plan (PT)    Rehab Potential (PT) good, to achieve stated therapy goals  -TW     Row Name 05/31/23 0854          Bed Mobility Goal 1 (PT)    Activity/Assistive Device (Bed Mobility Goal 1, PT) bed mobility activities, all  -TW     [place] Level/Cues Needed (Bed Mobility Goal 1, PT) standby assist;independent  -TW     Time Frame (Bed Mobility Goal 1, PT) by discharge  -TW     Progress/Outcomes (Bed Mobility Goal 1, PT) goal met   -TW     Row Name 05/31/23 0854          Transfer Goal 1 (PT)    Activity/Assistive Device  (Transfer Goal 1, PT) sit-to-stand/stand-to-sit;bed-to-chair/chair-to-bed  -TW     [place] Level/Cues Needed (Transfer Goal 1, PT) contact guard required;standby assist;modified independence  -TW     Time Frame (Transfer Goal 1, PT) by discharge  -TW     Progress/Outcome (Transfer Goal 1, PT) goal partially met;goal ongoing  partially met with CGA with sit to stand to sit  -TW     Row Name 05/31/23 0854          Gait Training Goal 1 (PT)    Activity/Assistive Device (Gait Training Goal 1, PT) gait (walking locomotion);decrease fall risk  -TW     [place] Level (Gait Training Goal 1, PT) contact guard required;standby assist;modified independence  -TW     Distance (Gait Training Goal 1, PT) 50ft or more each trip  -TW     Time Frame (Gait Training Goal 1, PT) 1 week  -TW     Progress/Outcome (Gait Training Goal 1, PT) goal not met  -TW     Row Name 05/31/23 0854          ROM Goal 1 (PT)    ROM Goal 1 (PT) Pt will tolerate LE exercises OOB in chair with VSS  -TW     Time Frame (ROM Goal 1, PT) by discharge  -TW     Progress/Outcome (ROM Goal 1, PT) goal not met;continuing progress toward goal  -TW           User Key  (r) = Recorded By, (t) = Taken By, (c) = Cosigned By    Initials Name Provider Type    TW Thierno Esteves, PTA Physical Therapist Assistant    Juany Sage RN Registered Nurse    Janae Severino LPN Licensed Nurse                Physical Therapy Education     Title: PT OT SLP Therapies (Done)     Topic: Physical Therapy (Done)     Point: Mobility training (Done)     Learning Progress Summary           Patient Acceptance, E, VU by AB at 5/31/2023 0519    Acceptance, E, NR by AME at 5/29/2023 1123    Acceptance, E, NR by AME at 5/26/2023 1356    Acceptance, E,TB, VU by KIMBERLY at 5/25/2023 1459    Acceptance, E, NR by AME at 5/24/2023 1306    Acceptance, E,TB, VU by NB at 5/18/2023 2138    Acceptance, E, NR by AME1 at 5/18/2023 1407                   Point: Home exercise program (Done)      Learning Progress Summary           Patient Acceptance, E, VU by AB at 5/31/2023 0519    Acceptance, E, VU by AB at 5/30/2023 0516    Acceptance, E,TB, VU by  at 5/25/2023 1459                   Point: Body mechanics (Done)     Learning Progress Summary           Patient Acceptance, E, VU by AB at 5/31/2023 0519    Acceptance, E, VU by AB at 5/30/2023 0516    Acceptance, E,TB, VU by  at 5/25/2023 1459    Acceptance, E,TB, VU by  at 5/25/2023 1451                   Point: Precautions (Done)     Learning Progress Summary           Patient Acceptance, E, VU by AB at 5/31/2023 0519    Acceptance, E, VU by AB at 5/30/2023 0516    Acceptance, E,TB, VU by  at 5/25/2023 1459    Acceptance, E, NR by [facility] at 5/18/2023 1407                               User Key     Initials Effective Dates Name Provider Type Discipline    [facility] 06/16/21 -  Aure Villaseñor, PT Physical Therapist PT     06/16/21 -  Ousmane Zhang PTA Physical Therapist Assistant PT     06/16/21 -  Sarah Irby MCKEON Occupational Therapist Assistant OT    NB 06/16/21 -  Samantha Cuellar RN Registered Nurse Nurse     09/08/22 -  Janae Amaya LPN Licensed Nurse Nurse    AB 11/07/22 -  Joana Mc LPN Licensed Nurse Nurse              PT Recommendation and Plan     Plan of Care Reviewed With: patient  Progress: improving  Outcome Evaluation: Pt reports feeling better. Pt agreeable to therapy. Pt able to t/f sup to sit with mod ind. Pt sat EOB and was able to zoë L LE prosthetic with some assistance from this PTA to ensure proper fit. Pt stood with CGA and amb 10ft before sitting in recliner. Pt positioned in recliner and left with all needs met. Pt would cont to benefit from therapy.   Outcome Measures     Row Name 05/29/23 1052             How much help from another person do you currently need...    Turning from your back to your side while in flat bed without using bedrails? 3  -AME      Moving from lying on back to sitting on the side  of a flat bed without bedrails? 3  -AME      Moving to and from a bed to a chair (including a wheelchair)? 3  -AME      Standing up from a chair using your arms (e.g., wheelchair, bedside chair)? 3  -AME      Climbing 3-5 steps with a railing? 1  -AME      To walk in hospital room? 1  -AME      AM-PAC 6 Clicks Score (PT) 14  -AME         Functional Assessment    Outcome Measure Options AM-PAC 6 Clicks Basic Mobility (PT)  -AME            User Key  (r) = Recorded By, (t) = Taken By, (c) = Cosigned By    Initials Name Provider Type    AME Ousmane Zhang PTA Physical Therapist Assistant                 Time Calculation:    PT Charges     Row Name 05/31/23 1258             Time Calculation    Start Time 0854  -TW      Stop Time 0920  -TW      Time Calculation (min) 26 min  -TW         Time Calculation- PT    Total Timed Code Minutes- PT 26 minute(s)  -TW            User Key  (r) = Recorded By, (t) = Taken By, (c) = Cosigned By    Initials Name Provider Type     Thierno Esteves PTA Physical Therapist Assistant              Therapy Charges for Today     Code Description Service Date Service Provider Modifiers Qty    58304935348 HC GAIT TRAINING EA 15 MIN 5/31/2023 Thierno Esteves PTA GP 1    09902611535 HC PT THERAPEUTIC ACT EA 15 MIN 5/31/2023 Thierno Esteves PTA GP 1          PT G-Codes  Outcome Measure Options: AM-PAC 6 Clicks Basic Mobility (PT)  AM-PAC 6 Clicks Score (PT): 16  AM-PAC 6 Clicks Score (OT): 21    Thierno Esteves PTA  5/31/2023

## 2023-05-31 NOTE — THERAPY TREATMENT NOTE
Patient Name: Ariana Martinez  : 1960    MRN: [id number]                              Today's Date: 2023       Admit Date: 2023    Visit Dx:     ICD-10-CM ICD-9-CM   1. Sepsis without acute organ dysfunction, due to unspecified organism  A41.9 038.9     995.91   2. Cellulitis of right lower extremity  L03.115 682.6   3. Type 2 diabetes mellitus with hyperglycemia, unspecified whether long term insulin use  E11.65 250.00   4. Impaired mobility and activities of daily living  Z74.09 V49.89    Z78.9    5. Impaired physical mobility  Z74.09 781.99   6. Impaired mobility and ADLs  Z74.09 V49.89    Z78.9    7. Urinary retention  R33.9 788.20     Patient Active Problem List   Diagnosis   • 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, with long-term current use of insulin   • Closed nondisplaced fracture of fifth left metatarsal bone   • MAURICIO (generalized anxiety disorder)   • Depression, major, recurrent, moderate (HCC)   • GERD without esophagitis   • Long term prescription opiate use   • Mixed hyperlipidemia   • Vitamin D deficiency   • Seasonal allergic rhinitis   • Restrictive lung disease secondary to obesity   • Adult body mass index 37.0-37.9   • Snoring   • Class 3 severe obesity due to excess calories without serious comorbidity with body mass index (BMI) of 40.0 to 44.9 in adult (HCC)   • (HFpEF) heart failure with preserved ejection fraction   • Type 2 diabetes mellitus with hyperglycemia, with long-term current use of insulin ([facility])   • Cyanocobalamin deficiency   • Coronary artery disease of native artery of native heart with stable angina pectoris   • Hypertension   • Meniere's disease   • Gastroparesis   • Pulmonary hypertension ([facility])   • Pes cavus   • Primary osteoarthritis involving multiple joints   • Generalized anxiety disorder   • Chronic right-sided low back pain with right-sided sciatica   • Chest pain   • Adverse effect of iron   • Chest pain due to myocardial  ischemia   • Nonrheumatic tricuspid valve regurgitation   • Iron deficiency anemia due to chronic blood loss   • Malaise and fatigue   • Ankle arthritis   • Urinary retention   • Endocarditis   • Essential hypertension   • Occlusion and stenosis of bilateral carotid arteries   • S/P BKA (below knee amputation) unilateral, left (HCC)   • Phantom pain after amputation of lower extremity   • S/P CABG (coronary artery bypass graft)   • Severe malnutrition   • TIA (transient ischemic attack)   • Coronary artery abnormality   • Elevated d-dimer   • Neuropathy   • Chest pain, unspecified type   • Acute pain of right shoulder   • Rotator cuff syndrome, right   • Acquired hypothyroidism   • Bilateral leg edema   • Left elbow pain   • Gastritis   • Olecranon bursitis, left elbow   • Sepsis without acute organ dysfunction, due to unspecified organism     Past Medical History:   Diagnosis Date   • Acute bacterial endocarditis 3/13/2021   • Angina, class IV    • Anxiety    • Anxiety and depression    • Arthritis    • Benign paroxysmal positional vertigo    • Bladder disorder     has bladder stimulator   • Carpal tunnel syndrome    • CHF (congestive heart failure)    • Chronic pain    • Coronary atherosclerosis     hx CABG 2005.  is followed by Dr Kwon   • Depression    • Diabetes mellitus     Type 2, controlled   • Diabetic polyneuropathy    • Disease of thyroid gland    • Elevated cholesterol    • Female stress incontinence    • Foot pain, left    • Full dentures    • Gastroparesis    • GERD (gastroesophageal reflux disease)    • Hyperlipidemia    • Hypertension    • Low back pain    • Malaise and fatigue    • Multiple joint pain    • Obesity     Refuses to be weighed   • Occlusion and stenosis of bilateral carotid arteries 6/18/2021   • Otalgia     Both   • Perforation of tympanic membrane     Left   • Postoperative wound infection    • Vitamin D deficiency    • Wears glasses     reading     Past Surgical History:    Procedure Laterality Date   • ABDOMINAL SURGERY     • AMPUTATION FOOT     • ANGIOPLASTY      coronary   • ANKLE ARTHROSCOPY Left 02/26/2021    Procedure: Left foot hardware removal, ankle arthroscopy, bone grafting , left foot exostectomy;  Surgeon: Ignacio Lord DPM;  Location: [facility] OR;  Service: Podiatry;  Laterality: Left;   • BREAST BIOPSY Right    • CARDIAC CATHETERIZATION     • CARDIAC CATHETERIZATION N/A 06/20/2017    Procedure: Right Heart Cath;  Surgeon: Can Kwon MD PhD;  Location: [facility] CATH INVASIVE LOCATION;  Service:    • CARDIAC CATHETERIZATION N/A 02/18/2020    Procedure: Left Heart Cath;  Surgeon: Catalina Cooper MD;  Location: [facility] CATH INVASIVE LOCATION;  Service: Cardiology;  Laterality: N/A;   • CARDIAC CATHETERIZATION N/A 04/06/2022    Procedure: Left Heart Cath;  Surgeon: Sheryl Navas MD;  Location: [facility] CATH INVASIVE LOCATION;  Service: Cardiology;  Laterality: N/A;   • CARPAL TUNNEL RELEASE     • CHOLECYSTECTOMY     • COLONOSCOPY N/A 06/24/2020    Procedure: COLONOSCOPY;  Surgeon: Julián Maldonado MD;  Location: [facility] ENDOSCOPY;  Service: Gastroenterology;  Laterality: N/A;   • CORONARY ARTERY BYPASS GRAFT  2005   • ENDOSCOPY N/A 10/19/2018    Procedure: ESOPHAGOGASTRODUODENOSCOPY possible dilation;  Surgeon: Julián Maldonado MD;  Location: [facility] ENDOSCOPY;  Service: Gastroenterology   • ENDOSCOPY N/A 06/24/2020    Procedure: ESOPHAGOGASTRODUODENOSCOPY WED appt please;  Surgeon: Julián Maldonado MD;  Location: [facility] ENDOSCOPY;  Service: Gastroenterology;  Laterality: N/A;   • ENDOSCOPY N/A 06/10/2022    Procedure: ESOPHAGOGASTRODUODENOSCOPY   room 380;  Surgeon: Jeremiah Wilkins MD;  Location: [facility] ENDOSCOPY;  Service: Gastroenterology;  Laterality: N/A;   • ENDOSCOPY N/A 1/10/2023    Procedure: ESOPHAGOGASTRODUODENOSCOPY;  Surgeon: Jeremiah Wilkins MD;  Location: [facility] ENDOSCOPY;  Service: Gastroenterology;  Laterality: N/A;   • ENDOSCOPY AND  COLONOSCOPY     • FOOT SURGERY      Toes   • FOOT SURGERY     • GASTRIC BANDING      Revision, laparoscopic   • HYSTERECTOMY     • INCISION AND DRAINAGE LEG Left 03/12/2021    Procedure: Left ankle arthroscopic irrigation and debridement, screw removal;  Surgeon: Ignacio Lord DPM;  Location: [facility];  Service: Podiatry;  Laterality: Left;   • MOUTH SURGERY     • SALPINGO OOPHORECTOMY     • SHOULDER SURGERY     • SUBTALAR ARTHRODESIS Left 01/16/2019    Procedure: LEFT FOOT HARDWARE REMOVAL, FIFTH METATARSAL , OPEN REDUCTION INTERNAL FIXATION, CALCANEAL OSTEOTOMY;  Surgeon: Ignacio Lord DPM;  Location: [facility];  Service: Podiatry   • SUBTALAR ARTHRODESIS Left 10/16/2019    Procedure: foot hardware removal, subtalar joint fusion  possible de/reattachment of achilles tendon        (c-arm);  Surgeon: Ignacio Lord DPM;  Location: [facility];  Service: Podiatry   • SUBTALAR ARTHRODESIS Left 09/30/2020    Procedure: subtalar, talonavicular joint arthrodesis.  Removal hardware.          (c-arm);  Surgeon: Ignacio Lord DPM;  Location: [facility];  Service: Podiatry;  Laterality: Left;   • TRANSESOPHAGEAL ECHOCARDIOGRAM (LAMONTE)      With color flow      General Information     Row Name 05/31/23 1013          OT Time and Intention    Document Type therapy note (daily note)  -CS     Mode of Treatment occupational therapy  -CS     Row Name 05/31/23 1013          General Information    Patient Profile Reviewed yes  -CS     Existing Precautions/Restrictions fall  -CS     Row Name 05/31/23 1013          Cognition    Orientation Status (Cognition) oriented x 4  -CS     Row Name 05/31/23 1013          Safety Issues, Functional Mobility    Impairments Affecting Function (Mobility) endurance/activity tolerance;pain;strength;balance  -CS           User Key  (r) = Recorded By, (t) = Taken By, (c) = Cosigned By    Initials Name Provider Type    CS Bessie Medina COTA Occupational Therapist Assistant                  Mobility/ADL's     Row Name 05/31/23 [facility]3          Activities of Daily Living    BADL Assessment/Intervention bathing  -     Row Name 05/31/23 [facility]          Bathing Assessment/Intervention    Comment, (Bathing) Pt stated that she already had a bath today.  -           User Key  (r) = Recorded By, (t) = Taken By, (c) = Cosigned By    Initials Name Provider Type     Bessie Medina COTA Occupational Therapist Assistant               Obj/Interventions     Row Name 05/31/23 101          Shoulder (Therapeutic Exercise)    Shoulder (Therapeutic Exercise) AROM (active range of motion)  -     Shoulder AROM (Therapeutic Exercise) bilateral;flexion;extension;external rotation;internal rotation  -     Shoulder Strengthening (Therapeutic Exercise) bilateral;flexion;extension;external rotation;internal rotation  -     Row Name 05/31/23 [facility]          Elbow/Forearm (Therapeutic Exercise)    Elbow/Forearm (Therapeutic Exercise) AROM (active range of motion)  -     Elbow/Forearm AROM (Therapeutic Exercise) bilateral;flexion;extension;supination;pronation  -     Elbow/Forearm Strengthening (Therapeutic Exercise) bilateral;extension;flexion;supination  -     Row Name 05/31/23 [facility]          Wrist (Therapeutic Exercise)    Wrist (Therapeutic Exercise) AROM (active range of motion)  -     Wrist AROM (Therapeutic Exercise) bilateral;flexion;extension  -     Wrist Strengthening (Therapeutic Exercise) bilateral;flexion;extension  -     Row Name 05/31/23 1013          Hand (Therapeutic Exercise)    Hand (Therapeutic Exercise) AROM (active range of motion)  -     Hand AROM/AAROM (Therapeutic Exercise) bilateral;finger flexion;finger extension  -     Row Name 05/31/23 [facility]          Motor Skills    Therapeutic Exercise shoulder;elbow/forearm;wrist;hand  -           User Key  (r) = Recorded By, (t) = Taken By, (c) = Cosigned By    Initials Name Provider Type    Bessie Contreras COTA Occupational  Therapist Assistant               Goals/Plan     Row Name 05/31/23 1013          Transfer Goal 1 (OT)    Activity/Assistive Device (Transfer Goal 1, OT) toilet;wheelchair transfer  -CS     [place] Level/Cues Needed (Transfer Goal 1, OT) modified independence  -CS     Time Frame (Transfer Goal 1, OT) long term goal (LTG)  -CS     Progress/Outcome (Transfer Goal 1, OT) goal not met  -CS     Row Name 05/31/23 1013          Bathing Goal 1 (OT)    Activity/Device (Bathing Goal 1, OT) bathing skills, all  -CS     [place] Level/Cues Needed (Bathing Goal 1, OT) modified independence  -CS     Time Frame (Bathing Goal 1, OT) long term goal (LTG)  -CS     Progress/Outcomes (Bathing Goal 1, OT) goal not met  -CS     Row Name 05/31/23 1013          Dressing Goal 1 (OT)    Activity/Device (Dressing Goal 1, OT) lower body dressing  -CS     [place]/Cues Needed (Dressing Goal 1, OT) modified independence  -CS     Time Frame (Dressing Goal 1, OT) long term goal (LTG)  -CS     Progress/Outcome (Dressing Goal 1, OT) goal not met  -CS     Row Name 05/31/23 1013          Toileting Goal 1 (OT)    Activity/Device (Toileting Goal 1, OT) toileting skills, all  -CS     [place] Level/Cues Needed (Toileting Goal 1, OT) modified independence  -CS     Time Frame (Toileting Goal 1, OT) long term goal (LTG)  -CS     Progress/Outcome (Toileting Goal 1, OT) goal not met  -CS     Row Name 05/31/23 1013          Self-Feeding Goal 1 (OT)    Activity/Device (Self-Feeding Goal 1, OT) self-feeding skills, all  -CS     [place] Level/Cues Needed (Self-Feeding Goal 1, OT) modified independence  -CS     Time Frame (Self-Feeding Goal 1, OT) long term goal (LTG)  -CS     Progress/Outcomes (Self-Feeding Goal 1, OT) goal met  -CS           User Key  (r) = Recorded By, (t) = Taken By, (c) = Cosigned By    Initials Name Provider Type    CS Bsesie Medina COTA Occupational Therapist Assistant               Clinical Impression     Row Name  05/31/23 1013          Pain Assessment    Pretreatment Pain Rating 5/10  -CS     Posttreatment Pain Rating 5/10  -CS     Pain Location - Side/Orientation Right  -CS     Pain Location - foot  -CS     Pain Intervention(s) Medication (See MAR);Rest  -CS     Row Name 05/31/23 1013          Plan of Care Review    Plan of Care Reviewed With patient  -CS     Progress improving  -CS     Outcome Evaluation Pt tolerated tx well this date. Pt was sitting up in chair upon arrival. Pt stated that she already had a bath today. Pt gave good effort with UE ther ex. No goals met this tx. Continue OT POC.  -CS     Row Name 05/31/23 1013          Therapy Assessment/Plan (OT)    Rehab Potential (OT) good, to achieve stated therapy goals  -CS     Criteria for Skilled Therapeutic Interventions Met (OT) yes;skilled treatment is necessary  -CS     Row Name 05/31/23 1013          Vital Signs    Pre Patient Position Sitting  -CS     Post Patient Position Sitting  -CS     Row Name 05/31/23 1013          Positioning and Restraints    Pre-Treatment Position sitting in chair/recliner  -CS     Post Treatment Position chair  -CS     In Chair reclined;call light within reach;encouraged to call for assist;exit alarm on  -CS           User Key  (r) = Recorded By, (t) = Taken By, (c) = Cosigned By    Initials Name Provider Type    CS Bessie Medina COTA Occupational Therapist Assistant               Outcome Measures     Row Name 05/31/23 1013          How much help from another is currently needed...    Putting on and taking off regular lower body clothing? 3  -CS     Bathing (including washing, rinsing, and drying) 3  -CS     Toileting (which includes using toilet bed pan or urinal) 3  -CS     Putting on and taking off regular upper body clothing 4  -CS     Taking care of personal grooming (such as brushing teeth) 4  -CS     Eating meals 4  -CS     AM-PAC 6 Clicks Score (OT) 21  -CS     Row Name 05/31/23 1112 05/31/23 1013       How much help  from another person do you currently need...    Turning from your back to your side while in flat bed without using bedrails? 4  -SK 4  -CS    Moving from lying on back to sitting on the side of a flat bed without bedrails? 3  -SK 3  -CS    Moving to and from a bed to a chair (including a wheelchair)? 3  -SK 3  -CS    Standing up from a chair using your arms (e.g., wheelchair, bedside chair)? 3  -SK 3  -CS    Climbing 3-5 steps with a railing? 1  -SK 1  -CS    To walk in hospital room? 2  -SK 2  -CS    AM-PAC 6 Clicks Score (PT) 16  -SK 16  -CS    Highest level of mobility 5 --> Static standing  -SK 5 --> Static standing  -CS          User Key  (r) = Recorded By, (t) = Taken By, (c) = Cosigned By    Initials Name Provider Type    Bessie Contreras COTA Occupational Therapist Assistant    SK Court Hayes RN Registered Nurse                Occupational Therapy Education     Title: PT OT SLP Therapies (Done)     Topic: Occupational Therapy (Done)     Point: ADL training (Done)     Description:   Instruct learner(s) on proper safety adaptation and remediation techniques during self care or transfers.   Instruct in proper use of assistive devices.              Learning Progress Summary           Patient Acceptance, E, VU by AB at 5/31/2023 0519    Acceptance, E,TB, VU by SOLOMON at 5/30/2023 1133    Comment: OT role, POC, t/f training    Acceptance, E, VU by AB at 5/30/2023 0516    Acceptance, E,TB, VU by KIMBERLY at 5/26/2023 1516    Acceptance, E,TB, VU by LW at 5/25/2023 1459    Acceptance, E,TB, VU by RW at 5/22/2023 1243    Comment: POC, Role of OT    Acceptance, E,TB, VU by BB at 5/20/2023 1358    Acceptance, E,TB, VU by BB at 5/20/2023 1357                   Point: Home exercise program (Done)     Description:   Instruct learner(s) on appropriate technique for monitoring, assisting and/or progressing therapeutic exercises/activities.              Learning Progress Summary           Patient Acceptance, E, VU by AB  at 5/31/2023 0519    Acceptance, E, VU by AB at 5/30/2023 0516    Acceptance, E,TB, VU by LW at 5/26/2023 1516    Acceptance, E,TB, VU by LW at 5/25/2023 1459    Acceptance, E,TB, VU by BB at 5/20/2023 1356                   Point: Precautions (Done)     Description:   Instruct learner(s) on prescribed precautions during self-care and functional transfers.              Learning Progress Summary           Patient Acceptance, E, VU by AB at 5/31/2023 0519    Acceptance, E,TB, VU by MC at 5/30/2023 1133    Comment: OT role, POC, t/f training    Acceptance, E, VU by AB at 5/30/2023 0516    Acceptance, E,TB, VU by LW at 5/26/2023 1516    Acceptance, E,TB, VU by LW at 5/25/2023 1459    Acceptance, E,TB, VU by RW at 5/22/2023 1243    Comment: POC, Role of OT    Acceptance, E, DU by RB at 5/18/2023 1352    Comment: Pt edu on use of gait belt and non skid socks when OOB and no OOB without assist.                   Point: Body mechanics (Done)     Description:   Instruct learner(s) on proper positioning and spine alignment during self-care, functional mobility activities and/or exercises.              Learning Progress Summary           Patient Acceptance, E, VU by AB at 5/31/2023 0519    Acceptance, E,TB, VU by  at 5/30/2023 1133    Comment: OT role, POC, t/f training    Acceptance, E, VU by AB at 5/30/2023 0516    Acceptance, E,TB, VU by LW at 5/26/2023 1516    Acceptance, E,TB, VU by LW at 5/25/2023 1459    Acceptance, E,TB, VU by RW at 5/22/2023 1243    Comment: POC, Role of OT    Acceptance, E,TB, VU by BB at 5/20/2023 1358    Acceptance, E,TB, VU by BB at 5/20/2023 1357                               User Key     Initials Effective Dates Name Provider Type Discipline    RB 06/16/21 - 05/30/23 Fredi France, OT Occupational Therapist OT    BB 06/16/21 -  Matilde Rios, LINDA Occupational Therapist Assistant OT     06/16/21 -  Sarah Irby COTA Occupational Therapist Assistant OT     10/19/22 -  Tao,  Dana, OT Occupational Therapist OT    RW 09/22/22 -  Missy Esteves OT Occupational Therapist OT    AB 11/07/22 -  Joana Mc LPN Licensed Nurse Nurse              OT Recommendation and Plan     Plan of Care Review  Plan of Care Reviewed With: patient  Progress: improving  Outcome Evaluation: Pt tolerated tx well this date. Pt was sitting up in chair upon arrival. Pt stated that she already had a bath today. Pt gave good effort with UE ther ex. No goals met this tx. Continue OT POC.     Time Calculation:    Time Calculation- OT     Row Name 05/31/23 1304             Time Calculation- OT    OT Start Time 1013  -CS      OT Stop Time 1036  -CS      OT Time Calculation (min) 23 min  -CS      Total Timed Code Minutes- OT 23 minute(s)  -CS      OT Received On 05/31/23  -CS         Timed Charges    52182 - OT Therapeutic Exercise Minutes 23  -CS         Total Minutes    Timed Charges Total Minutes 23  -CS       Total Minutes 23  -CS            User Key  (r) = Recorded By, (t) = Taken By, (c) = Cosigned By    Initials Name Provider Type    CS Bessie Medina COTA Occupational Therapist Assistant              Therapy Charges for Today     Code Description Service Date Service Provider Modifiers Qty    48164084644 HC OT THER PROC EA 15 MIN 5/31/2023 Bessie Medina COTA GO 2               LINDA Davila  5/31/2023

## 2023-05-31 NOTE — PROGRESS NOTES
"   LOS: 14 days   Patient Care Team:  Dolly Foss APRN as PCP - General (Family Medicine)  Uziel Alonso MD as Consulting Physician (Hematology and Oncology)  Elvis Gamboa APRN as Nurse Practitioner (Endocrinology)  Teressa Hammond APRN as Nurse Practitioner (Oncology)    Subjective     Subjective:  Symptoms:  Stable.        History taken from: patient chart    Objective     Vital Signs  Temp:  [96.8 °F (36 °C)-97.8 °F (36.6 °C)] 96.8 °F (36 °C)  Heart Rate:  [71-77] 73  Resp:  [16-18] 18  BP: (100-148)/(51-66) 100/51    Objective:  General Appearance:  In no acute distress.    Vital signs: (most recent): Blood pressure 100/51, pulse 73, temperature 96.8 °F (36 °C), temperature source Temporal, resp. rate 18, height 172.7 cm (68\"), weight 122 kg (269 lb), SpO2 94 %, not currently breastfeeding.  Vital signs are normal.  No fever.    Output: Producing urine.    Lungs:  Normal effort and normal respiratory rate.    Abdomen: Abdomen is soft and non-distended.  There is no abdominal tenderness.     Neurological: Patient is alert.    Pupils:  Pupils are equal, round, and reactive to light.    Skin:  Warm and dry.              Results Review:    Lab Results (last 24 hours)     Procedure Component Value Units Date/Time    POC Glucose Once [[id number]]  (Abnormal) Collected: 05/31/23 1037    Specimen: Blood Updated: 05/31/23 1128     Glucose 342 mg/dL      Comment: RN NotifiedOperator: [id number] [place] HEATHERMeter ID: [id number]       POC Glucose Once [[id number]]  (Abnormal) Collected: 05/31/23 0715    Specimen: Blood Updated: 05/31/23 0758     Glucose 259 mg/dL      Comment: RN NotifiedOperator: [id number] MonstrousLIN HEATHERMeter ID: [id number]       Extra Tubes [[id number]] Collected: 05/31/23 0539    Specimen: Blood, Venous Line Updated: 05/31/23 0646    Narrative:      The following orders were created for panel order Extra Tubes.  Procedure                               Abnormality         Status     "                 ---------                               -----------         ------                     Green Top (Gel)[[id number]]                                  Final result                 Please view results for these tests on the individual orders.    Green Top (Gel) [[id number]] Collected: 05/31/23 0539    Specimen: Blood Updated: 05/31/23 0646     Extra Tube Hold for add-ons.     Comment: Auto resulted.       CBC Auto Differential [[id number]]  (Abnormal) Collected: 05/31/23 0538    Specimen: Blood Updated: 05/31/23 0608     WBC 9.31 10*3/mm3      RBC 4.07 10*6/mm3      Hemoglobin 11.8 g/dL      Hematocrit 34.7 %      MCV 85.3 fL      MCH 29.0 pg      MCHC 34.0 g/dL      RDW 13.7 %      RDW-SD 42.4 fl      MPV 9.0 fL      Platelets 416 10*3/mm3      Neutrophil % 67.9 %      Lymphocyte % 22.1 %      Monocyte % 6.9 %      Eosinophil % 2.3 %      Basophil % 0.4 %      Immature Grans % 0.4 %      Neutrophils, Absolute 6.32 10*3/mm3      Lymphocytes, Absolute 2.06 10*3/mm3      Monocytes, Absolute 0.64 10*3/mm3      Eosinophils, Absolute 0.21 10*3/mm3      Basophils, Absolute 0.04 10*3/mm3      Immature Grans, Absolute 0.04 10*3/mm3      nRBC 0.0 /100 WBC     POC Glucose Once [[id number]]  (Abnormal) Collected: 05/30/23 1935    Specimen: Blood Updated: 05/30/23 2016     Glucose 269 mg/dL      Comment: RN NotifiedOperator: [id number] [place] [facility]: [id number]            Imaging Results (Last 24 Hours)     ** No results found for the last 24 hours. **           I reviewed the patient's new clinical results.  I reviewed the patient's new imaging results and agree with the interpretation.  I reviewed the patient's other test results and agree with the interpretation      Assessment & Plan       Sepsis without acute organ dysfunction, due to unspecified organism    Urinary retention      Assessment & Plan    POD 4 cystoscopy showing open urethra, catheter cystitis, no retention after Jose  removed  Estimated Creatinine Clearance: 88.3 mL/min (by C-G formula based on SCr of 0.92 mg/dL).    Plan:  Urology will sign off  Follow up Urology 1-2 weeks outpatient to schedule InterStim battery change    BEBE Jang  05/31/23  17:26 CDT

## 2023-05-31 NOTE — NURSING NOTE
Spoke with patient about possible admission to the Acute Rehab Unit and she is agreeable. Reviewed chart with Dr. Guzman and patient has been accepted. Pre-cert is required. Case management notified.    [facility] Brochure and Data Collection Summary for Patients in Inpatient Rehab Facilities given to patient.

## 2023-05-31 NOTE — PLAN OF CARE
Goal Outcome Evaluation:  Plan of Care Reviewed With: patient        Progress: improving  Outcome Evaluation: Pt tolerated tx well this date. Pt was sitting up in chair upon arrival. Pt stated that she already had a bath today. Pt gave good effort with UE ther ex. No goals met this tx. Continue OT POC.

## 2023-06-01 LAB
BASOPHILS # BLD AUTO: 0.06 10*3/MM3 (ref 0–0.2)
BASOPHILS NFR BLD AUTO: 0.6 % (ref 0–1.5)
DEPRECATED RDW RBC AUTO: 43.4 FL (ref 37–54)
EOSINOPHIL # BLD AUTO: 0.21 10*3/MM3 (ref 0–0.4)
EOSINOPHIL NFR BLD AUTO: 2.1 % (ref 0.3–6.2)
ERYTHROCYTE [DISTWIDTH] IN BLOOD BY AUTOMATED COUNT: 13.7 % (ref 12.3–15.4)
GLUCOSE BLDC GLUCOMTR-MCNC: 220 MG/DL (ref 70–130)
GLUCOSE BLDC GLUCOMTR-MCNC: 227 MG/DL (ref 70–130)
GLUCOSE BLDC GLUCOMTR-MCNC: 252 MG/DL (ref 70–130)
GLUCOSE BLDC GLUCOMTR-MCNC: 314 MG/DL (ref 70–130)
HCT VFR BLD AUTO: 38 % (ref 34–46.6)
HGB BLD-MCNC: 12.3 G/DL (ref 12–15.9)
IMM GRANULOCYTES # BLD AUTO: 0.03 10*3/MM3 (ref 0–0.05)
IMM GRANULOCYTES NFR BLD AUTO: 0.3 % (ref 0–0.5)
LYMPHOCYTES # BLD AUTO: 2.04 10*3/MM3 (ref 0.7–3.1)
LYMPHOCYTES NFR BLD AUTO: 20.8 % (ref 19.6–45.3)
MCH RBC QN AUTO: 28.3 PG (ref 26.6–33)
MCHC RBC AUTO-ENTMCNC: 32.4 G/DL (ref 31.5–35.7)
MCV RBC AUTO: 87.6 FL (ref 79–97)
MONOCYTES # BLD AUTO: 0.68 10*3/MM3 (ref 0.1–0.9)
MONOCYTES NFR BLD AUTO: 6.9 % (ref 5–12)
NEUTROPHILS NFR BLD AUTO: 6.8 10*3/MM3 (ref 1.7–7)
NEUTROPHILS NFR BLD AUTO: 69.3 % (ref 42.7–76)
NRBC BLD AUTO-RTO: 0 /100 WBC (ref 0–0.2)
PLATELET # BLD AUTO: 460 10*3/MM3 (ref 140–450)
PMV BLD AUTO: 8.8 FL (ref 6–12)
RBC # BLD AUTO: 4.34 10*6/MM3 (ref 3.77–5.28)
WBC NRBC COR # BLD: 9.82 10*3/MM3 (ref 3.4–10.8)

## 2023-06-01 PROCEDURE — 63710000001 ONDANSETRON PER 8 MG: Performed by: UROLOGY

## 2023-06-01 PROCEDURE — 63710000001 INSULIN ASPART PER 5 UNITS: Performed by: UROLOGY

## 2023-06-01 PROCEDURE — 82948 REAGENT STRIP/BLOOD GLUCOSE: CPT

## 2023-06-01 PROCEDURE — 25010000002 HEPARIN (PORCINE) PER 1000 UNITS: Performed by: UROLOGY

## 2023-06-01 PROCEDURE — 97110 THERAPEUTIC EXERCISES: CPT

## 2023-06-01 PROCEDURE — 63710000001 INSULIN DETEMIR PER 5 UNITS: Performed by: HOSPITALIST

## 2023-06-01 PROCEDURE — 63710000001 INSULIN DETEMIR PER 5 UNITS: Performed by: UROLOGY

## 2023-06-01 PROCEDURE — 97530 THERAPEUTIC ACTIVITIES: CPT

## 2023-06-01 PROCEDURE — 85025 COMPLETE CBC W/AUTO DIFF WBC: CPT | Performed by: HOSPITALIST

## 2023-06-01 PROCEDURE — 25010000002 ONDANSETRON PER 1 MG: Performed by: UROLOGY

## 2023-06-01 RX ORDER — LORAZEPAM 0.5 MG/1
0.5 TABLET ORAL EVERY 8 HOURS PRN
Status: DISCONTINUED | OUTPATIENT
Start: 2023-06-01 | End: 2023-06-08 | Stop reason: HOSPADM

## 2023-06-01 RX ADMIN — ASPIRIN 325 MG: 325 TABLET, COATED ORAL at 08:55

## 2023-06-01 RX ADMIN — PANTOPRAZOLE SODIUM 40 MG: 40 TABLET, DELAYED RELEASE ORAL at 08:54

## 2023-06-01 RX ADMIN — HEPARIN SODIUM 5000 UNITS: 5000 INJECTION INTRAVENOUS; SUBCUTANEOUS at 14:03

## 2023-06-01 RX ADMIN — HEPARIN SODIUM 5000 UNITS: 5000 INJECTION INTRAVENOUS; SUBCUTANEOUS at 06:20

## 2023-06-01 RX ADMIN — INSULIN ASPART 8 UNITS: 100 INJECTION, SOLUTION INTRAVENOUS; SUBCUTANEOUS at 08:54

## 2023-06-01 RX ADMIN — HYDROCODONE BITARTRATE AND ACETAMINOPHEN 1 TABLET: 10; 325 TABLET ORAL at 20:50

## 2023-06-01 RX ADMIN — ATORVASTATIN CALCIUM 80 MG: 40 TABLET, FILM COATED ORAL at 08:54

## 2023-06-01 RX ADMIN — ISOSORBIDE MONONITRATE 30 MG: 30 TABLET, EXTENDED RELEASE ORAL at 08:55

## 2023-06-01 RX ADMIN — SUCRALFATE 1 G: 1 TABLET ORAL at 20:50

## 2023-06-01 RX ADMIN — CLOPIDOGREL BISULFATE 75 MG: 75 TABLET ORAL at 08:55

## 2023-06-01 RX ADMIN — SUCRALFATE 1 G: 1 TABLET ORAL at 17:23

## 2023-06-01 RX ADMIN — GABAPENTIN 200 MG: 100 CAPSULE ORAL at 20:49

## 2023-06-01 RX ADMIN — Medication 1 APPLICATION: at 20:46

## 2023-06-01 RX ADMIN — ONDANSETRON HYDROCHLORIDE 4 MG: 4 TABLET, FILM COATED ORAL at 11:16

## 2023-06-01 RX ADMIN — ARIPIPRAZOLE 5 MG: 5 TABLET ORAL at 08:55

## 2023-06-01 RX ADMIN — INSULIN DETEMIR 45 UNITS: 100 INJECTION, SOLUTION SUBCUTANEOUS at 20:49

## 2023-06-01 RX ADMIN — SUCRALFATE 1 G: 1 TABLET ORAL at 08:54

## 2023-06-01 RX ADMIN — LEVOTHYROXINE SODIUM 50 MCG: 50 TABLET ORAL at 06:20

## 2023-06-01 RX ADMIN — INSULIN ASPART 5 UNITS: 100 INJECTION, SOLUTION INTRAVENOUS; SUBCUTANEOUS at 17:23

## 2023-06-01 RX ADMIN — FOLIC ACID 1000 MCG: 1 TABLET ORAL at 08:54

## 2023-06-01 RX ADMIN — VENLAFAXINE HYDROCHLORIDE 75 MG: 75 CAPSULE, EXTENDED RELEASE ORAL at 08:55

## 2023-06-01 RX ADMIN — TAMSULOSIN HYDROCHLORIDE 0.4 MG: 0.4 CAPSULE ORAL at 08:55

## 2023-06-01 RX ADMIN — Medication 10 ML: at 08:55

## 2023-06-01 RX ADMIN — HYDROCHLOROTHIAZIDE 12.5 MG: 12.5 TABLET ORAL at 08:55

## 2023-06-01 RX ADMIN — INSULIN ASPART 5 UNITS: 100 INJECTION, SOLUTION INTRAVENOUS; SUBCUTANEOUS at 11:16

## 2023-06-01 RX ADMIN — AMLODIPINE BESYLATE 5 MG: 5 TABLET ORAL at 08:54

## 2023-06-01 RX ADMIN — Medication 10 ML: at 20:50

## 2023-06-01 RX ADMIN — HEPARIN SODIUM 5000 UNITS: 5000 INJECTION INTRAVENOUS; SUBCUTANEOUS at 20:51

## 2023-06-01 RX ADMIN — RANOLAZINE 500 MG: 500 TABLET, FILM COATED, EXTENDED RELEASE ORAL at 20:50

## 2023-06-01 RX ADMIN — RANOLAZINE 500 MG: 500 TABLET, FILM COATED, EXTENDED RELEASE ORAL at 08:55

## 2023-06-01 RX ADMIN — GABAPENTIN 200 MG: 100 CAPSULE ORAL at 08:54

## 2023-06-01 RX ADMIN — FUROSEMIDE 40 MG: 40 TABLET ORAL at 08:55

## 2023-06-01 RX ADMIN — HYDROCODONE BITARTRATE AND ACETAMINOPHEN 1 TABLET: 10; 325 TABLET ORAL at 04:37

## 2023-06-01 RX ADMIN — INSULIN DETEMIR 30 UNITS: 100 INJECTION, SOLUTION SUBCUTANEOUS at 08:54

## 2023-06-01 RX ADMIN — LORAZEPAM 0.5 MG: 0.5 TABLET ORAL at 14:03

## 2023-06-01 RX ADMIN — Medication 10 ML: at 20:51

## 2023-06-01 RX ADMIN — ONDANSETRON 4 MG: 2 INJECTION INTRAMUSCULAR; INTRAVENOUS at 04:37

## 2023-06-01 RX ADMIN — Medication 1 APPLICATION: at 08:54

## 2023-06-01 RX ADMIN — ONDANSETRON HYDROCHLORIDE 4 MG: 4 TABLET, FILM COATED ORAL at 20:50

## 2023-06-01 NOTE — THERAPY TREATMENT NOTE
Patient Name: Ariana Martinez  : 1960    MRN: [id number]                              Today's Date: 2023       Admit Date: 2023    Visit Dx:     ICD-10-CM ICD-9-CM   1. Sepsis without acute organ dysfunction, due to unspecified organism  A41.9 038.9     995.91   2. Cellulitis of right lower extremity  L03.115 682.6   3. Type 2 diabetes mellitus with hyperglycemia, unspecified whether long term insulin use  E11.65 250.00   4. Impaired mobility and activities of daily living  Z74.09 V49.89    Z78.9    5. Impaired physical mobility  Z74.09 781.99   6. Impaired mobility and ADLs  Z74.09 V49.89    Z78.9    7. Urinary retention  R33.9 788.20     Patient Active Problem List   Diagnosis   • 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, with long-term current use of insulin   • Closed nondisplaced fracture of fifth left metatarsal bone   • MAURICIO (generalized anxiety disorder)   • Depression, major, recurrent, moderate (HCC)   • GERD without esophagitis   • Long term prescription opiate use   • Mixed hyperlipidemia   • Vitamin D deficiency   • Seasonal allergic rhinitis   • Restrictive lung disease secondary to obesity   • Adult body mass index 37.0-37.9   • Snoring   • Class 3 severe obesity due to excess calories without serious comorbidity with body mass index (BMI) of 40.0 to 44.9 in adult (HCC)   • (HFpEF) heart failure with preserved ejection fraction   • Type 2 diabetes mellitus with hyperglycemia, with long-term current use of insulin ([facility])   • Cyanocobalamin deficiency   • Coronary artery disease of native artery of native heart with stable angina pectoris   • Hypertension   • Meniere's disease   • Gastroparesis   • Pulmonary hypertension ([facility])   • Pes cavus   • Primary osteoarthritis involving multiple joints   • Generalized anxiety disorder   • Chronic right-sided low back pain with right-sided sciatica   • Chest pain   • Adverse effect of iron   • Chest pain due to myocardial  ischemia   • Nonrheumatic tricuspid valve regurgitation   • Iron deficiency anemia due to chronic blood loss   • Malaise and fatigue   • Ankle arthritis   • Urinary retention   • Endocarditis   • Essential hypertension   • Occlusion and stenosis of bilateral carotid arteries   • S/P BKA (below knee amputation) unilateral, left (HCC)   • Phantom pain after amputation of lower extremity   • S/P CABG (coronary artery bypass graft)   • Severe malnutrition   • TIA (transient ischemic attack)   • Coronary artery abnormality   • Elevated d-dimer   • Neuropathy   • Chest pain, unspecified type   • Acute pain of right shoulder   • Rotator cuff syndrome, right   • Acquired hypothyroidism   • Bilateral leg edema   • Left elbow pain   • Gastritis   • Olecranon bursitis, left elbow   • Sepsis without acute organ dysfunction, due to unspecified organism     Past Medical History:   Diagnosis Date   • Acute bacterial endocarditis 3/13/2021   • Angina, class IV    • Anxiety    • Anxiety and depression    • Arthritis    • Benign paroxysmal positional vertigo    • Bladder disorder     has bladder stimulator   • Carpal tunnel syndrome    • CHF (congestive heart failure)    • Chronic pain    • Coronary atherosclerosis     hx CABG 2005.  is followed by Dr Kwon   • Depression    • Diabetes mellitus     Type 2, controlled   • Diabetic polyneuropathy    • Disease of thyroid gland    • Elevated cholesterol    • Female stress incontinence    • Foot pain, left    • Full dentures    • Gastroparesis    • GERD (gastroesophageal reflux disease)    • Hyperlipidemia    • Hypertension    • Low back pain    • Malaise and fatigue    • Multiple joint pain    • Obesity     Refuses to be weighed   • Occlusion and stenosis of bilateral carotid arteries 6/18/2021   • Otalgia     Both   • Perforation of tympanic membrane     Left   • Postoperative wound infection    • Vitamin D deficiency    • Wears glasses     reading     Past Surgical History:    Procedure Laterality Date   • ABDOMINAL SURGERY     • AMPUTATION FOOT     • ANGIOPLASTY      coronary   • ANKLE ARTHROSCOPY Left 02/26/2021    Procedure: Left foot hardware removal, ankle arthroscopy, bone grafting , left foot exostectomy;  Surgeon: Ignacio Lord DPM;  Location: [facility] OR;  Service: Podiatry;  Laterality: Left;   • BREAST BIOPSY Right    • CARDIAC CATHETERIZATION     • CARDIAC CATHETERIZATION N/A 06/20/2017    Procedure: Right Heart Cath;  Surgeon: Can Kwon MD PhD;  Location: [facility] CATH INVASIVE LOCATION;  Service:    • CARDIAC CATHETERIZATION N/A 02/18/2020    Procedure: Left Heart Cath;  Surgeon: Catalina Cooper MD;  Location: [facility] CATH INVASIVE LOCATION;  Service: Cardiology;  Laterality: N/A;   • CARDIAC CATHETERIZATION N/A 04/06/2022    Procedure: Left Heart Cath;  Surgeon: Sheryl Navas MD;  Location: [facility] CATH INVASIVE LOCATION;  Service: Cardiology;  Laterality: N/A;   • CARPAL TUNNEL RELEASE     • CHOLECYSTECTOMY     • COLONOSCOPY N/A 06/24/2020    Procedure: COLONOSCOPY;  Surgeon: Julián Maldonado MD;  Location: [facility] ENDOSCOPY;  Service: Gastroenterology;  Laterality: N/A;   • CORONARY ARTERY BYPASS GRAFT  2005   • ENDOSCOPY N/A 10/19/2018    Procedure: ESOPHAGOGASTRODUODENOSCOPY possible dilation;  Surgeon: Julián Maldonado MD;  Location: [facility] ENDOSCOPY;  Service: Gastroenterology   • ENDOSCOPY N/A 06/24/2020    Procedure: ESOPHAGOGASTRODUODENOSCOPY WED appt please;  Surgeon: Julián Maldonado MD;  Location: [facility] ENDOSCOPY;  Service: Gastroenterology;  Laterality: N/A;   • ENDOSCOPY N/A 06/10/2022    Procedure: ESOPHAGOGASTRODUODENOSCOPY   room 380;  Surgeon: Jeremiah Wilkins MD;  Location: [facility] ENDOSCOPY;  Service: Gastroenterology;  Laterality: N/A;   • ENDOSCOPY N/A 1/10/2023    Procedure: ESOPHAGOGASTRODUODENOSCOPY;  Surgeon: Jeremiah Wilkins MD;  Location: [facility] ENDOSCOPY;  Service: Gastroenterology;  Laterality: N/A;   • ENDOSCOPY AND  COLONOSCOPY     • FOOT SURGERY      Toes   • FOOT SURGERY     • GASTRIC BANDING      Revision, laparoscopic   • HYSTERECTOMY     • INCISION AND DRAINAGE LEG Left 03/12/2021    Procedure: Left ankle arthroscopic irrigation and debridement, screw removal;  Surgeon: Ignacio Lord DPM;  Location: [facility];  Service: Podiatry;  Laterality: Left;   • MOUTH SURGERY     • SALPINGO OOPHORECTOMY     • SHOULDER SURGERY     • SUBTALAR ARTHRODESIS Left 01/16/2019    Procedure: LEFT FOOT HARDWARE REMOVAL, FIFTH METATARSAL , OPEN REDUCTION INTERNAL FIXATION, CALCANEAL OSTEOTOMY;  Surgeon: Ignacio Lord DPM;  Location: [facility];  Service: Podiatry   • SUBTALAR ARTHRODESIS Left 10/16/2019    Procedure: foot hardware removal, subtalar joint fusion  possible de/reattachment of achilles tendon        (c-arm);  Surgeon: Ignacio Lord DPM;  Location: [facility];  Service: Podiatry   • SUBTALAR ARTHRODESIS Left 09/30/2020    Procedure: subtalar, talonavicular joint arthrodesis.  Removal hardware.          (c-arm);  Surgeon: Ignacio Lord DPM;  Location: [facility];  Service: Podiatry;  Laterality: Left;   • TRANSESOPHAGEAL ECHOCARDIOGRAM (LAMONTE)      With color flow      General Information     Row Name 06/01/23 1101          OT Time and Intention    Document Type therapy note (daily note)  -CS     Mode of Treatment occupational therapy  -CS     Row Name 06/01/23 1101          General Information    Patient Profile Reviewed yes  -CS     Existing Precautions/Restrictions fall  -CS     Row Name 06/01/23 1101          Cognition    Orientation Status (Cognition) oriented x 4  -CS     Row Name 06/01/23 1101          Safety Issues, Functional Mobility    Impairments Affecting Function (Mobility) endurance/activity tolerance;pain;strength;balance  -CS           User Key  (r) = Recorded By, (t) = Taken By, (c) = Cosigned By    Initials Name Provider Type    CS Bessie Medina COTA Occupational Therapist Assistant                  Mobility/ADL's    No documentation.                Obj/Interventions     Row Name 06/01/23 1101          Shoulder (Therapeutic Exercise)    Shoulder (Therapeutic Exercise) AROM (active range of motion)  -     Shoulder AROM (Therapeutic Exercise) bilateral;flexion;extension;external rotation;internal rotation  -     Shoulder Strengthening (Therapeutic Exercise) bilateral;flexion;extension;external rotation;internal rotation  -     Row Name 06/01/23 1101          Elbow/Forearm (Therapeutic Exercise)    Elbow/Forearm (Therapeutic Exercise) AROM (active range of motion)  -     Elbow/Forearm AROM (Therapeutic Exercise) bilateral;flexion;extension;supination;pronation  -     Elbow/Forearm Strengthening (Therapeutic Exercise) bilateral;flexion;extension;supination;pronation  -     Row Name 06/01/23 1101          Wrist (Therapeutic Exercise)    Wrist (Therapeutic Exercise) AROM (active range of motion)  -     Wrist AROM (Therapeutic Exercise) bilateral;flexion;extension  -     Wrist Strengthening (Therapeutic Exercise) bilateral;flexion;extension  -     Row Name 06/01/23 1101          Hand (Therapeutic Exercise)    Hand (Therapeutic Exercise) AROM (active range of motion)  -     Hand AROM/AAROM (Therapeutic Exercise) bilateral;finger flexion;finger extension  -     Hand Strengthening (Therapeutic Exercise) bilateral  -     Row Name 06/01/23 1101          Motor Skills    Therapeutic Exercise shoulder;elbow/forearm;wrist;hand  -           User Key  (r) = Recorded By, (t) = Taken By, (c) = Cosigned By    Initials Name Provider Type    CS Bessie Medina COTA Occupational Therapist Assistant               Goals/Plan     Row Name 06/01/23 1101          Transfer Goal 1 (OT)    Activity/Assistive Device (Transfer Goal 1, OT) toilet;wheelchair transfer  -CS     [place] Level/Cues Needed (Transfer Goal 1, OT) modified independence  -CS     Time Frame (Transfer Goal 1, OT) long term goal (LTG)   -CS     Progress/Outcome (Transfer Goal 1, OT) goal not met  -CS     Row Name 06/01/23 1101          Bathing Goal 1 (OT)    Activity/Device (Bathing Goal 1, OT) bathing skills, all  -CS     [place] Level/Cues Needed (Bathing Goal 1, OT) modified independence  -CS     Time Frame (Bathing Goal 1, OT) long term goal (LTG)  -CS     Progress/Outcomes (Bathing Goal 1, OT) goal not met  -CS     Row Name 06/01/23 1101          Dressing Goal 1 (OT)    Activity/Device (Dressing Goal 1, OT) lower body dressing  -CS     [place]/Cues Needed (Dressing Goal 1, OT) modified independence  -CS     Time Frame (Dressing Goal 1, OT) long term goal (LTG)  -CS     Progress/Outcome (Dressing Goal 1, OT) goal not met  -CS     Row Name 06/01/23 1101          Toileting Goal 1 (OT)    Activity/Device (Toileting Goal 1, OT) toileting skills, all  -CS     [place] Level/Cues Needed (Toileting Goal 1, OT) modified independence  -CS     Time Frame (Toileting Goal 1, OT) long term goal (LTG)  -CS     Progress/Outcome (Toileting Goal 1, OT) goal not met  -CS     Row Name 06/01/23 1101          Self-Feeding Goal 1 (OT)    Activity/Device (Self-Feeding Goal 1, OT) self-feeding skills, all  -CS     [place] Level/Cues Needed (Self-Feeding Goal 1, OT) modified independence  -CS     Time Frame (Self-Feeding Goal 1, OT) long term goal (LTG)  -CS     Progress/Outcomes (Self-Feeding Goal 1, OT) goal met  -CS           User Key  (r) = Recorded By, (t) = Taken By, (c) = Cosigned By    Initials Name Provider Type    CS Bessie Medina COTA Occupational Therapist Assistant               Clinical Impression     Row Name 06/01/23 1101          Pain Assessment    Pretreatment Pain Rating 5/10  -CS     Posttreatment Pain Rating 5/10  -CS     Pain Location - Side/Orientation Right  -CS     Pain Location - foot  -CS     Pain Intervention(s) Medication (See MAR);Rest;Repositioned  -CS     Row Name 06/01/23 1101          Plan of Care Review     Plan of Care Reviewed With patient  -CS     Progress improving  -CS     Outcome Evaluation Pt sitting up in chair upon arrival. Pt gave good effort with UE ther ex. No goals met this tx. Continue OT POC.  -CS     Row Name 06/01/23 1101          Therapy Assessment/Plan (OT)    Rehab Potential (OT) good, to achieve stated therapy goals  -CS     Criteria for Skilled Therapeutic Interventions Met (OT) yes;skilled treatment is necessary  -CS     Row Name 06/01/23 1101          Vital Signs    Pre Patient Position Sitting  -CS     Post Patient Position Sitting  -CS     Row Name 06/01/23 1101          Positioning and Restraints    Pre-Treatment Position sitting in chair/recliner  -CS     Post Treatment Position chair  -CS     In Chair reclined;call light within reach;encouraged to call for assist;exit alarm on  -CS           User Key  (r) = Recorded By, (t) = Taken By, (c) = Cosigned By    Initials Name Provider Type    CS Bessie Medina COTA Occupational Therapist Assistant               Outcome Measures     Row Name 06/01/23 0900          How much help from another person do you currently need...    Turning from your back to your side while in flat bed without using bedrails? 4  -RW     Moving from lying on back to sitting on the side of a flat bed without bedrails? 4  -RW     Moving to and from a bed to a chair (including a wheelchair)? 3  -RW     Standing up from a chair using your arms (e.g., wheelchair, bedside chair)? 3  -RW     Climbing 3-5 steps with a railing? 2  -RW     To walk in hospital room? 3  -RW     AM-PAC 6 Clicks Score (PT) 19  -RW     Highest level of mobility 6 --> Walked 10 steps or more  -RW           User Key  (r) = Recorded By, (t) = Taken By, (c) = Cosigned By    Initials Name Provider Type    RW Sergio Fuentes, PTA Physical Therapist Assistant                Occupational Therapy Education     Title: PT OT SLP Therapies (Done)     Topic: Occupational Therapy (Done)     Point: ADL training  (Done)     Description:   Instruct learner(s) on proper safety adaptation and remediation techniques during self care or transfers.   Instruct in proper use of assistive devices.              Learning Progress Summary           Patient Acceptance, E, VU by AB at 6/1/2023 0535    Acceptance, E, VU by AB at 5/31/2023 0519    Acceptance, E,TB, VU by MC at 5/30/2023 1133    Comment: OT role, POC, t/f training    Acceptance, E, VU by AB at 5/30/2023 0516    Acceptance, E,TB, VU by LW at 5/26/2023 1516    Acceptance, E,TB, VU by LW at 5/25/2023 1459    Acceptance, E,TB, VU by RW at 5/22/2023 1243    Comment: POC, Role of OT    Acceptance, E,TB, VU by BB at 5/20/2023 1358    Acceptance, E,TB, VU by BB at 5/20/2023 1357                   Point: Home exercise program (Done)     Description:   Instruct learner(s) on appropriate technique for monitoring, assisting and/or progressing therapeutic exercises/activities.              Learning Progress Summary           Patient Acceptance, E, VU by AB at 6/1/2023 0535    Acceptance, E, VU by AB at 5/31/2023 0519    Acceptance, E, VU by AB at 5/30/2023 0516    Acceptance, E,TB, VU by LW at 5/26/2023 1516    Acceptance, E,TB, VU by LW at 5/25/2023 1459    Acceptance, E,TB, VU by BB at 5/20/2023 1356                   Point: Precautions (Done)     Description:   Instruct learner(s) on prescribed precautions during self-care and functional transfers.              Learning Progress Summary           Patient Acceptance, E, VU by AB at 6/1/2023 0535    Acceptance, E, VU by AB at 5/31/2023 0519    Acceptance, E,TB, VU by MC at 5/30/2023 1133    Comment: OT role, POC, t/f training    Acceptance, E, VU by AB at 5/30/2023 0516    Acceptance, E,TB, VU by LW at 5/26/2023 1516    Acceptance, E,TB, VU by LW at 5/25/2023 1459    Acceptance, E,TB, VU by RW at 5/22/2023 1243    Comment: POC, Role of OT    ANN Hdz DU by RB at 5/18/2023 1352    Comment: Pt edu on use of gait belt and non skid  socks when OOB and no OOB without assist.                   Point: Body mechanics (Done)     Description:   Instruct learner(s) on proper positioning and spine alignment during self-care, functional mobility activities and/or exercises.              Learning Progress Summary           Patient Acceptance, E, VU by AB at 6/1/2023 0535    Acceptance, E, VU by AB at 5/31/2023 0519    Acceptance, E,TB, VU by MC at 5/30/2023 1133    Comment: OT role, POC, t/f training    Acceptance, E, VU by AB at 5/30/2023 0516    Acceptance, E,TB, VU by LW at 5/26/2023 1516    Acceptance, E,TB, VU by LW at 5/25/2023 1459    Acceptance, E,TB, VU by RW at 5/22/2023 1243    Comment: POC, Role of OT    Acceptance, E,TB, VU by BB at 5/20/2023 1358    Acceptance, E,TB, VU by BB at 5/20/2023 1357                               User Key     Initials Effective Dates Name Provider Type Discipline    RB 06/16/21 - 05/30/23 Fredi France, OT Occupational Therapist OT    BB 06/16/21 -  Matilde Rios COTA Occupational Therapist Assistant OT    LW 06/16/21 -  Sarah Irby COTA Occupational Therapist Assistant OT     10/19/22 -  Dana Burger, OT Occupational Therapist OT    RW 09/22/22 -  Missy Esteves OT Occupational Therapist OT    AB 11/07/22 -  Joana Mc LPN Licensed Nurse Nurse              OT Recommendation and Plan     Plan of Care Review  Plan of Care Reviewed With: patient  Progress: improving  Outcome Evaluation: Pt sitting up in chair upon arrival. Pt gave good effort with UE ther ex. No goals met this tx. Continue OT POC.     Time Calculation:    Time Calculation- OT     Row Name 06/01/23 1528             Time Calculation- OT    OT Start Time 1101  -CS      OT Stop Time 1124  -CS      OT Time Calculation (min) 23 min  -CS      Total Timed Code Minutes- OT 23 minute(s)  -CS      OT Received On 06/01/23  -CS         Timed Charges    25276 - OT Therapeutic Exercise Minutes 23  -CS         Total Minutes    Timed  Charges Total Minutes 23  -CS       Total Minutes 23  -CS            User Key  (r) = Recorded By, (t) = Taken By, (c) = Cosigned By    Initials Name Provider Type    CS Bessie Medina COTA Occupational Therapist Assistant              Therapy Charges for Today     Code Description Service Date Service Provider Modifiers Qty    11954651808 HC OT THER PROC EA 15 MIN 5/31/2023 Bessie Medina COTA GO 2    64504030317 HC OT THER PROC EA 15 MIN 6/1/2023 Bessie Medina COTA GO 2 Carrie L Stewart, COTA  6/1/2023

## 2023-06-01 NOTE — PLAN OF CARE
Goal Outcome Evaluation:            Vss. Patient has ambulated to the bedside commode with little assistance. Complaints of pain and nausea this shift and was medicated per orders. No acute events this shift and patient appears to be resting at this time.

## 2023-06-01 NOTE — PLAN OF CARE
Goal Outcome Evaluation:  Plan of Care Reviewed With: patient        Progress: improving  Outcome Evaluation: mod ind bed mob, ind with donning prosthesis. stands with cga and mb 14' round bed to  chair with fww and cga. seated therex in chair. dc to aru soon.

## 2023-06-01 NOTE — PROGRESS NOTES
[facility] Medicine Services  INPATIENT PROGRESS NOTE      Length of Stay: 15  Date of Admission: 5/17/2023  Primary Care Physician: Dolly Foss APRN    Subjective   Chief Complaint: Nausea with eating  HPI:    64yo female pmh significant htn/hyperlipidemia/dm2/hypothyroid/cad/HFpEF/obesity presents ED via EMS c/o 1d hx fever/chills/generalized weakness.  ROS (+) nonproductive cough.  Denies sore throat/rhinorrhea/chest pain/soa/abd pain/n/v/d/dysuria.    Daily assessment 6/1/2023  On evaluation patient is currently lying comfortably in bed.  She is awake.  Denies any new issues or complaints.  Remains weak.  Stable for discharge however the patient is being evaluated for ARU at this time and awaiting acceptance to the facility.    Review of Systems   Constitutional: Positive for fatigue.   Respiratory: Negative for shortness of breath.    Cardiovascular: Negative for chest pain.   Neurological: Positive for weakness.        All pertinent negatives and positives are as above. All other systems have been reviewed and are negative unless otherwise stated.     Objective    As of today 06/01/23  Temp:  [96.8 °F (36 °C)-98 °F (36.7 °C)] 98 °F (36.7 °C)  Heart Rate:  [73-85] 83  Resp:  [18] 18  BP: (100-128)/(51-73) 126/54    Physical Exam  Vitals reviewed.   Constitutional:       Appearance: She is well-developed.   HENT:      Head: Normocephalic and atraumatic.      Right Ear: External ear normal.      Left Ear: External ear normal.      Mouth/Throat:      Pharynx: Oropharynx is clear.   Eyes:      Extraocular Movements: Extraocular movements intact.      Conjunctiva/sclera: Conjunctivae normal.      Pupils: Pupils are equal, round, and reactive to light.   Cardiovascular:      Rate and Rhythm: Normal rate and regular rhythm.      Heart sounds: Normal heart sounds. No murmur heard.    No friction rub. No gallop.   Pulmonary:      Effort: Pulmonary effort is  normal. No respiratory distress.      Breath sounds: Normal breath sounds. No wheezing or rales.   Chest:      Chest wall: No tenderness.   Abdominal:      General: Bowel sounds are normal. There is no distension.      Palpations: Abdomen is soft.      Tenderness: There is no abdominal tenderness. There is no guarding.   Musculoskeletal:      Cervical back: Normal range of motion and neck supple.      Comments: Left BKA   Skin:     General: Skin is warm and dry.      Comments: Right lower extremity erythema improving.  Blister on dorsum of right foot.  Ruptured blister on medial aspect of right distal lower extremity.   Neurological:      Motor: Weakness present.   Psychiatric:         Behavior: Behavior normal.         Thought Content: Thought content normal.           amLODIPine, 5 mg, Oral, Daily  ARIPiprazole, 5 mg, Oral, Daily  aspirin EC, 325 mg, Oral, Daily  atorvastatin, 80 mg, Oral, Daily  Bag Balm, 1 application, Topical, BID  clopidogrel, 75 mg, Oral, Daily  cyanocobalamin, 1,000 mcg, Intramuscular, Q28 Days  folic acid, 1,000 mcg, Oral, Daily  furosemide, 40 mg, Oral, Daily  gabapentin, 200 mg, Oral, Q12H  heparin (porcine), 5,000 Units, Subcutaneous, Q8H  hydroCHLOROthiazide, 12.5 mg, Oral, Daily  Insulin Aspart, 0-14 Units, Subcutaneous, TID AC  insulin detemir, 30 Units, Subcutaneous, Daily  insulin detemir, 45 Units, Subcutaneous, Nightly  isosorbide mononitrate, 30 mg, Oral, Daily  levothyroxine, 50 mcg, Oral, QAM  pantoprazole, 40 mg, Oral, Daily  ranolazine, 500 mg, Oral, Q12H  senna-docusate sodium, 2 tablet, Oral, BID  sodium chloride, 10 mL, Intravenous, Q12H  sodium chloride, 10 mL, Intravenous, Q12H  sucralfate, 1 g, Oral, 4x Daily  tamsulosin, 0.4 mg, Oral, Daily  venlafaxine XR, 75 mg, Oral, Daily With Breakfast         Results Review:  I have reviewed the labs, radiology results, and diagnostic studies.    Laboratory Data:   Results from last 7 days   Lab Units 05/28/23  0654   SODIUM  mmol/L 136   POTASSIUM mmol/L 4.0   CHLORIDE mmol/L 98   CO2 mmol/L 28.0   BUN mg/dL 12   CREATININE mg/dL 0.92   GLUCOSE mg/dL 295*   CALCIUM mg/dL 9.3   ANION GAP mmol/L 10.0     Estimated Creatinine Clearance: 88.7 mL/min (by C-G formula based on SCr of 0.92 mg/dL).          Results from last 7 days   Lab Units 06/01/23  0603 05/31/23  0538 05/30/23  0639 05/29/23  0525 05/28/23  0653   WBC 10*3/mm3 9.82 9.31 11.11* 8.86 11.87*   HEMOGLOBIN g/dL 12.3 11.8* 12.1 11.4* 11.6*   HEMATOCRIT % 38.0 34.7 37.4 35.0 35.7   PLATELETS 10*3/mm3 460* 416 520* 525* 488*           Culture Data:   No results found for: BLOODCX  No results found for: URINECX  No results found for: RESPCX  No results found for: WOUNDCX  No results found for: STOOLCX  No components found for: BODYFLD    Radiology Data:   Imaging Results (Last 24 Hours)     ** No results found for the last 24 hours. **          I have reviewed the patient's current medications.     Assessment/Plan     Principal Problem:    Sepsis without acute organ dysfunction, due to unspecified organism  Active Problems:    Urinary retention      1.  Right lower extremity cellulitis:   Improving nicely.    Completed antibiotic course.    Wound care following.  Continue current treatment.    2.  Urinary retention: Appreciate urology help.  Urinary retention is resolved     3.  Hypertension:   Current blood pressure is 126/54.  O2 saturation 94% on room air.  Continue amlodipine, Lasix, hydrochlorothiazide    4.  Coronary artery disease:   Stable continue atorvastatin, Plavix, Imdur, Ranexa    5.  Diabetes mellitus:  Continue Levemir, sliding scale and ADA diet    Monitor.      6.  Deconditioning:   Continue PT/OT.  Patient is doing some better, but still feels that she needs rehab prior to home.     DVT prophylaxis: Heparin.  CODE STATUS full code    Awaiting acceptance to ARU.  Have discussed case with case management.  If ARU is not accept the patient it would be advisable to  discharge the patient to skilled nursing facility.  Case management indicated that the patient is not agreeable to skilled nursing facility however if that is the only safe option should be explored.    Medical Decision Making  Number and Complexity of problems: Several highly complex medical problems     Conditions and Status:        Condition is improving.        Discussed with: Patient and      Treatment Plan  As above     Care Planning  Shared decision making: Patient in agreement with plan of care  Code status and discussions: Full code     Disposition  Social Determinants of Health that impact treatment or disposition: None  I expect the patient to be discharged to home in 1-2 days.         The patient was evaluated during the global COVID-19 pandemic, and the diagnosis was suspected/considered upon their initial presentation.  Evaluation, treatment, and testing were consistent with current guidelines for patients who present with complaints or symptoms that may be related to COVID-19.     I confirmed that the patient's Advance Care Plan is present, code status is documented, or surrogate decision maker is listed in the patient's medical record.      Reji Mendoza, DO

## 2023-06-01 NOTE — THERAPY TREATMENT NOTE
Acute Care - Physical Therapy Treatment Note  [facility]     Patient Name: Ariana Martinez  : 1960  MRN: [id number]  Today's Date: 2023      Visit Dx:     ICD-10-CM ICD-9-CM   1. Sepsis without acute organ dysfunction, due to unspecified organism  A41.9 038.9     995.91   2. Cellulitis of right lower extremity  L03.115 682.6   3. Type 2 diabetes mellitus with hyperglycemia, unspecified whether long term insulin use  E11.65 250.00   4. Impaired mobility and activities of daily living  Z74.09 V49.89    Z78.9    5. Impaired physical mobility  Z74.09 781.99   6. Impaired mobility and ADLs  Z74.09 V49.89    Z78.9    7. Urinary retention  R33.9 788.20     Patient Active Problem List   Diagnosis   • 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, with long-term current use of insulin   • Closed nondisplaced fracture of fifth left metatarsal bone   • MAURICIO (generalized anxiety disorder)   • Depression, major, recurrent, moderate ([facility])   • GERD without esophagitis   • Long term prescription opiate use   • Mixed hyperlipidemia   • Vitamin D deficiency   • Seasonal allergic rhinitis   • Restrictive lung disease secondary to obesity   • Adult body mass index 37.0-37.9   • Snoring   • Class 3 severe obesity due to excess calories without serious comorbidity with body mass index (BMI) of 40.0 to 44.9 in adult ([facility])   • (HFpEF) heart failure with preserved ejection fraction   • Type 2 diabetes mellitus with hyperglycemia, with long-term current use of insulin ([facility])   • Cyanocobalamin deficiency   • Coronary artery disease of native artery of native heart with stable angina pectoris   • Hypertension   • Meniere's disease   • Gastroparesis   • Pulmonary hypertension ([facility])   • Pes cavus   • Primary osteoarthritis involving multiple joints   • Generalized anxiety disorder   • Chronic right-sided low back pain with right-sided sciatica   • Chest pain   • Adverse effect of iron   • Chest pain due to  myocardial ischemia   • Nonrheumatic tricuspid valve regurgitation   • Iron deficiency anemia due to chronic blood loss   • Malaise and fatigue   • Ankle arthritis   • Urinary retention   • Endocarditis   • Essential hypertension   • Occlusion and stenosis of bilateral carotid arteries   • S/P BKA (below knee amputation) unilateral, left (HCC)   • Phantom pain after amputation of lower extremity   • S/P CABG (coronary artery bypass graft)   • Severe malnutrition   • TIA (transient ischemic attack)   • Coronary artery abnormality   • Elevated d-dimer   • Neuropathy   • Chest pain, unspecified type   • Acute pain of right shoulder   • Rotator cuff syndrome, right   • Acquired hypothyroidism   • Bilateral leg edema   • Left elbow pain   • Gastritis   • Olecranon bursitis, left elbow   • Sepsis without acute organ dysfunction, due to unspecified organism     Past Medical History:   Diagnosis Date   • Acute bacterial endocarditis 3/13/2021   • Angina, class IV    • Anxiety    • Anxiety and depression    • Arthritis    • Benign paroxysmal positional vertigo    • Bladder disorder     has bladder stimulator   • Carpal tunnel syndrome    • CHF (congestive heart failure)    • Chronic pain    • Coronary atherosclerosis     hx CABG 2005.  is followed by Dr Kwon   • Depression    • Diabetes mellitus     Type 2, controlled   • Diabetic polyneuropathy    • Disease of thyroid gland    • Elevated cholesterol    • Female stress incontinence    • Foot pain, left    • Full dentures    • Gastroparesis    • GERD (gastroesophageal reflux disease)    • Hyperlipidemia    • Hypertension    • Low back pain    • Malaise and fatigue    • Multiple joint pain    • Obesity     Refuses to be weighed   • Occlusion and stenosis of bilateral carotid arteries 6/18/2021   • Otalgia     Both   • Perforation of tympanic membrane     Left   • Postoperative wound infection    • Vitamin D deficiency    • Wears glasses     reading     Past Surgical  History:   Procedure Laterality Date   • ABDOMINAL SURGERY     • AMPUTATION FOOT     • ANGIOPLASTY      coronary   • ANKLE ARTHROSCOPY Left 02/26/2021    Procedure: Left foot hardware removal, ankle arthroscopy, bone grafting , left foot exostectomy;  Surgeon: Ignacio Lord DPM;  Location: [facility] OR;  Service: Podiatry;  Laterality: Left;   • BREAST BIOPSY Right    • CARDIAC CATHETERIZATION     • CARDIAC CATHETERIZATION N/A 06/20/2017    Procedure: Right Heart Cath;  Surgeon: Can Kwon MD PhD;  Location: [facility] CATH INVASIVE LOCATION;  Service:    • CARDIAC CATHETERIZATION N/A 02/18/2020    Procedure: Left Heart Cath;  Surgeon: Catalina Cooper MD;  Location: [facility] CATH INVASIVE LOCATION;  Service: Cardiology;  Laterality: N/A;   • CARDIAC CATHETERIZATION N/A 04/06/2022    Procedure: Left Heart Cath;  Surgeon: Sheryl Navas MD;  Location: [facility] CATH INVASIVE LOCATION;  Service: Cardiology;  Laterality: N/A;   • CARPAL TUNNEL RELEASE     • CHOLECYSTECTOMY     • COLONOSCOPY N/A 06/24/2020    Procedure: COLONOSCOPY;  Surgeon: Julián Maldonado MD;  Location: [facility] ENDOSCOPY;  Service: Gastroenterology;  Laterality: N/A;   • CORONARY ARTERY BYPASS GRAFT  2005   • ENDOSCOPY N/A 10/19/2018    Procedure: ESOPHAGOGASTRODUODENOSCOPY possible dilation;  Surgeon: Julián Maldonado MD;  Location: [facility] ENDOSCOPY;  Service: Gastroenterology   • ENDOSCOPY N/A 06/24/2020    Procedure: ESOPHAGOGASTRODUODENOSCOPY WED appt please;  Surgeon: Julián Maldonado MD;  Location: [facility] ENDOSCOPY;  Service: Gastroenterology;  Laterality: N/A;   • ENDOSCOPY N/A 06/10/2022    Procedure: ESOPHAGOGASTRODUODENOSCOPY   room 380;  Surgeon: Jeremiah Wilkins MD;  Location: [facility] ENDOSCOPY;  Service: Gastroenterology;  Laterality: N/A;   • ENDOSCOPY N/A 1/10/2023    Procedure: ESOPHAGOGASTRODUODENOSCOPY;  Surgeon: Jeremiah Wilkins MD;  Location: [facility] ENDOSCOPY;  Service: Gastroenterology;  Laterality: N/A;   •  ENDOSCOPY AND COLONOSCOPY     • FOOT SURGERY      Toes   • FOOT SURGERY     • GASTRIC BANDING      Revision, laparoscopic   • HYSTERECTOMY     • INCISION AND DRAINAGE LEG Left 03/12/2021    Procedure: Left ankle arthroscopic irrigation and debridement, screw removal;  Surgeon: Ignacio Lord DPM;  Location: [facility];  Service: Podiatry;  Laterality: Left;   • MOUTH SURGERY     • SALPINGO OOPHORECTOMY     • SHOULDER SURGERY     • SUBTALAR ARTHRODESIS Left 01/16/2019    Procedure: LEFT FOOT HARDWARE REMOVAL, FIFTH METATARSAL , OPEN REDUCTION INTERNAL FIXATION, CALCANEAL OSTEOTOMY;  Surgeon: Ignacio Lord DPM;  Location: [facility];  Service: Podiatry   • SUBTALAR ARTHRODESIS Left 10/16/2019    Procedure: foot hardware removal, subtalar joint fusion  possible de/reattachment of achilles tendon        (c-arm);  Surgeon: Ignacio Lord DPM;  Location: [facility];  Service: Podiatry   • SUBTALAR ARTHRODESIS Left 09/30/2020    Procedure: subtalar, talonavicular joint arthrodesis.  Removal hardware.          (c-arm);  Surgeon: Ignacio Lord DPM;  Location: [facility];  Service: Podiatry;  Laterality: Left;   • TRANSESOPHAGEAL ECHOCARDIOGRAM (LAMONTE)      With color flow     PT Assessment (last 12 hours)     PT Evaluation and Treatment     Row Name 06/01/23 0913          Physical Therapy Time and Intention    Document Type therapy note (daily note)  -RW     Mode of Treatment physical therapy;individual therapy  -RW     Patient Effort good  -RW     Row Name 06/01/23 0913          General Information    Patient Profile Reviewed yes  -RW     Existing Precautions/Restrictions fall  -RW     Row Name 06/01/23 0913          Pain    Pretreatment Pain Rating 5/10  -RW     Posttreatment Pain Rating 5/10  -RW     Pain Location - back  -RW     Row Name 06/01/23 0913          Cognition    Affect/Mental Status (Cognition) WFL  -RW     Orientation Status (Cognition) oriented x 4  -RW     Follows Commands (Cognition) WNL   -RW     Row Name 06/01/23 0913          Bed Mobility    Supine-Sit [place] (Bed Mobility) modified independence  -RW     Row Name 06/01/23 0913          Sit-Stand Transfer    Sit-Stand [place] (Transfers) contact guard  -RW     Assistive Device (Sit-Stand Transfers) walker, front-wheeled  -RW     Row Name 06/01/23 0913          Stand-Sit Transfer    Stand-Sit [place] (Transfers) contact guard  -RW     Assistive Device (Stand-Sit Transfers) walker, front-wheeled  -RW     Row Name 06/01/23 0913          Gait/Stairs (Locomotion)    [place] Level (Gait) contact guard  -RW     Assistive Device (Gait) walker, front-wheeled  -RW     Distance in Feet (Gait) 14  -RW     Pattern (Gait) step-to  -RW     Deviations/Abnormal Patterns (Gait) luis decreased;stride length decreased  -RW     Row Name 06/01/23 0913          Hip (Therapeutic Exercise)    Hip Strengthening (Therapeutic Exercise) aBduction;aDduction;10 repetitions  -RW     Row Name 06/01/23 0913          Knee (Therapeutic Exercise)    Knee (Therapeutic Exercise) isometric exercises  -RW     Knee Isometrics (Therapeutic Exercise) left;quad sets;10 repetitions  -RW     Row Name 06/01/23 0913          Ankle (Therapeutic Exercise)    Ankle AROM (Therapeutic Exercise) dorsiflexion;plantarflexion;15 repititions;right  -RW     Row Name             Wound 05/17/23 1828 Right anterior fifth toe    Wound - Properties Group Placement Date: 05/17/23  - Placement Time: 1828 - Side: Right  -JH Orientation: anterior  -JH Location: fifth toe  -JH    Retired Wound - Properties Group Placement Date: 05/17/23  - Placement Time: 1828 - Side: Right  -JH Orientation: anterior  -JH Location: fifth toe  -JH    Retired Wound - Properties Group Date first assessed: 05/17/23  - Time first assessed: 1828  - Side: Right  -JH Location: fifth toe  -JH    Row Name             Wound 05/23/23 1006 Right distal leg    Wound - Properties Group Placement Date: 05/23/23   -LH Placement Time: 1006  -LH Present on Hospital Admission: N  -LH Side: Right  -LH Orientation: distal  -LH Location: leg  -LH    Retired Wound - Properties Group Placement Date: 05/23/23  - Placement Time: 1006  -LH Present on Hospital Admission: N  -LH Side: Right  -LH Orientation: distal  -LH Location: leg  -LH    Retired Wound - Properties Group Date first assessed: 05/23/23  - Time first assessed: 1006  -LH Present on Hospital Admission: N  -LH Side: Right  -LH Location: leg  -LH    Row Name 06/01/23 0913          Vital Signs    Pre Systolic BP Rehab 128  -RW     Pre Treatment Diastolic BP 55  -RW     Pretreatment Heart Rate (beats/min) 76  -RW     Pre SpO2 (%) 94  -RW     O2 Delivery Pre Treatment room air  -RW     Row Name 06/01/23 0913          Therapy Assessment/Plan (PT)    Rehab Potential (PT) good, to achieve stated therapy goals  -RW     Row Name 06/01/23 0913          Bed Mobility Goal 1 (PT)    Activity/Assistive Device (Bed Mobility Goal 1, PT) bed mobility activities, all  -RW     [place] Level/Cues Needed (Bed Mobility Goal 1, PT) standby assist;independent  -RW     Time Frame (Bed Mobility Goal 1, PT) by discharge  -RW     Progress/Outcomes (Bed Mobility Goal 1, PT) goal met  -RW     Row Name 06/01/23 0913          Transfer Goal 1 (PT)    Activity/Assistive Device (Transfer Goal 1, PT) sit-to-stand/stand-to-sit;bed-to-chair/chair-to-bed  -RW     [place] Level/Cues Needed (Transfer Goal 1, PT) contact guard required;standby assist;modified independence  -RW     Time Frame (Transfer Goal 1, PT) by discharge  -RW     Progress/Outcome (Transfer Goal 1, PT) goal partially met;goal ongoing  partially met with CGA with sit to stand to sit  -RW     Row Name 06/01/23 0913          Gait Training Goal 1 (PT)    Activity/Assistive Device (Gait Training Goal 1, PT) gait (walking locomotion);decrease fall risk  -RW     [place] Level (Gait Training Goal 1, PT) contact guard required;standby  assist;modified independence  -RW     Distance (Gait Training Goal 1, PT) 50ft or more each trip  -RW     Time Frame (Gait Training Goal 1, PT) 1 week  -RW     Progress/Outcome (Gait Training Goal 1, PT) goal not met  -RW     Row Name 06/01/23 0913          ROM Goal 1 (PT)    ROM Goal 1 (PT) Pt will tolerate LE exercises OOB in chair with VSS  -RW     Time Frame (ROM Goal 1, PT) by discharge  -RW     Progress/Outcome (ROM Goal 1, PT) goal met  -RW           User Key  (r) = Recorded By, (t) = Taken By, (c) = Cosigned By    Initials Name Provider Type    Sergio Mejia PTA Physical Therapist Assistant    Juany Sage, RN Registered Nurse    Janae Severino LPN Licensed Nurse                Physical Therapy Education     Title: PT OT SLP Therapies (Done)     Topic: Physical Therapy (Done)     Point: Mobility training (Done)     Learning Progress Summary           Patient Acceptance, E, VU by AB at 6/1/2023 0535    Acceptance, E, VU by AB at 5/31/2023 0519    Acceptance, E, NR by AME at 5/29/2023 1123    Acceptance, E, NR by AME at 5/26/2023 1356    Acceptance, E,TB, VU by KIMBERLY at 5/25/2023 1459    Acceptance, E, NR by AME at 5/24/2023 1306    Acceptance, E,TB, VU by NB at 5/18/2023 2138    Acceptance, E, NR by JC1 at 5/18/2023 1407                   Point: Home exercise program (Done)     Learning Progress Summary           Patient Acceptance, E, VU by AB at 6/1/2023 0535    Acceptance, E, VU by AB at 5/31/2023 0519    Acceptance, E, VU by AB at 5/30/2023 0516    Acceptance, E,TB, VU by KIMBERLY at 5/25/2023 1459                   Point: Body mechanics (Done)     Learning Progress Summary           Patient Acceptance, E, VU by AB at 6/1/2023 0535    Acceptance, E, VU by AB at 5/31/2023 0519    Acceptance, E, VU by AB at 5/30/2023 0516    Acceptance, E,TB, VU by KIMBERLY at 5/25/2023 1459    Acceptance, E,TB, VU by  at 5/25/2023 1451                   Point: Precautions (Done)     Learning Progress Summary            Patient Acceptance, E, VU by AB at 6/1/2023 0535    Acceptance, E, VU by AB at 5/31/2023 0519    Acceptance, E, VU by AB at 5/30/2023 0516    Acceptance, E,TB, VU by  at 5/25/2023 1459    Acceptance, E, NR by [facility] at 5/18/2023 1407                               User Key     Initials Effective Dates Name Provider Type Discipline    [facility] 06/16/21 -  Aure Villaseñor, PT Physical Therapist PT     06/16/21 -  Ousmane Zhang PTA Physical Therapist Assistant PT    LW 06/16/21 -  Sarah Irby COTA Occupational Therapist Assistant OT    NB 06/16/21 -  Samantha Cuellar RN Registered Nurse Nurse     09/08/22 -  Janae Amaya LPN Licensed Nurse Nurse    AB 11/07/22 -  Joana Mc LPN Licensed Nurse Nurse              PT Recommendation and Plan  Anticipated Discharge Disposition (PT): inpatient rehabilitation facility  Therapy Frequency (PT): other (see comments) (5-7 days/wk)  Plan of Care Reviewed With: patient  Progress: improving  Outcome Evaluation: mod ind bed mob, ind with donning prosthesis. stands with cga and mb 14' round bed to  chair with fww and cga. seated therex in chair. dc to aru soon.   Outcome Measures     Row Name 06/01/23 0900 05/29/23 1054          How much help from another person do you currently need...    Turning from your back to your side while in flat bed without using bedrails? 4  -RW 3  -AME     Moving from lying on back to sitting on the side of a flat bed without bedrails? 4  -RW 3  -AME     Moving to and from a bed to a chair (including a wheelchair)? 3  -RW 3  -AME     Standing up from a chair using your arms (e.g., wheelchair, bedside chair)? 3  -RW 3  -AME     Climbing 3-5 steps with a railing? 2  -RW 1  -AME     To walk in hospital room? 3  -RW 1  -AME     AM-PAC 6 Clicks Score (PT) 19  -RW 14  -AME        Functional Assessment    Outcome Measure Options -- AM-PAC 6 Clicks Basic Mobility (PT)  -           User Key  (r) = Recorded By, (t) = Taken By, (c) = Cosigned By     Initials Name Provider Type    Ousmane Gomes PTA Physical Therapist Assistant    Sergio Mejia PTA Physical Therapist Assistant                 Time Calculation:    PT Charges     Row Name 06/01/23 0937             Time Calculation    Start Time 0913  -RW      Stop Time 0937  -RW      Time Calculation (min) 24 min  -RW         Time Calculation- PT    Total Timed Code Minutes- PT 24 minute(s)  -RW         Timed Charges    27965 - PT Therapeutic Activity Minutes 24  -RW         Total Minutes    Timed Charges Total Minutes 24  -RW       Total Minutes 24  -RW            User Key  (r) = Recorded By, (t) = Taken By, (c) = Cosigned By    Initials Name Provider Type    Sergio Mejia PTA Physical Therapist Assistant              Therapy Charges for Today     Code Description Service Date Service Provider Modifiers Qty    16594444048 HC PT THERAPEUTIC ACT EA 15 MIN 6/1/2023 Sergio Fuentes PTA GP 2          PT G-Codes  Outcome Measure Options: AM-PAC 6 Clicks Basic Mobility (PT)  AM-PAC 6 Clicks Score (PT): 19  AM-PAC 6 Clicks Score (OT): 21    Sergio Fuentes PTA  6/1/2023

## 2023-06-01 NOTE — DISCHARGE PLACEMENT REQUEST
"Ariana Bailey (63 y.o. Female)     Date of Birth   1960    Social Security Number       Address   [address] [place] [id number]    Home Phone   [phone]    MRN   [id number]       Hindu   Lutheran    Marital Status                               Admission Date   5/17/23    Admission Type   Emergency    Admitting Provider       Attending Provider   Santy Rodriguez MD    Department, Room/Bed   [address], 411/1       Discharge Date       Discharge Disposition       Discharge Destination                               Attending Provider: Santy Rodriguez MD    Allergies: Seroquel [Quetiapine], Avandia [Rosiglitazone], Morphine And Related, Oxycodone    Isolation: None   Infection: None   Code Status: CPR    Ht: 172.7 cm (68\")   Wt: 123 kg (270 lb 4.8 oz)    Admission Cmt: None   Principal Problem: Sepsis without acute organ dysfunction, due to unspecified organism [A41.9]                 Active Insurance as of 5/17/2023     Primary Coverage     Payor Plan Insurance Group Employer/Plan Group    [facility] BLUE CROSS Merged with [employer] EMPLOYEE [id number]     Payor Plan Address Payor Plan Phone Number Payor Plan Fax Number Effective Dates    [address] [phone]  1/1/2022 - None Entered    [facility] [id number]       Subscriber Name Subscriber Birth Date Member ID       ARIANA BAILEY 1960 [id number]                 Emergency Contacts      (Rel.) Home Phone Work Phone Mobile Phone    Nadeem Bailey (Spouse) [phone] -- [phone]    Елена David (Sister) [phone] -- [phone]            Emergency Contact Information     Name Relation Home Work Mobile    Nadeem Bailey Spouse [phone]    Елена David Sister [phone]          Insurance Information                [facility] [employer] EMPLOYEE Phone: [phone]    Subscriber: Ariana Bailey" Luis Subscriber#: [id number]    Group#: [id number] Precert#: --

## 2023-06-01 NOTE — PLAN OF CARE
Goal Outcome Evaluation:  Plan of Care Reviewed With: patient        Progress: improving  Outcome Evaluation: Pt sitting up in chair upon arrival. Pt gave good effort with UE ther ex. No goals met this tx. Continue OT POC.

## 2023-06-02 LAB
BASOPHILS # BLD AUTO: 0.06 10*3/MM3 (ref 0–0.2)
BASOPHILS NFR BLD AUTO: 0.6 % (ref 0–1.5)
DEPRECATED RDW RBC AUTO: 43.5 FL (ref 37–54)
EOSINOPHIL # BLD AUTO: 0.17 10*3/MM3 (ref 0–0.4)
EOSINOPHIL NFR BLD AUTO: 1.8 % (ref 0.3–6.2)
ERYTHROCYTE [DISTWIDTH] IN BLOOD BY AUTOMATED COUNT: 13.9 % (ref 12.3–15.4)
GLUCOSE BLDC GLUCOMTR-MCNC: 252 MG/DL (ref 70–130)
GLUCOSE BLDC GLUCOMTR-MCNC: 323 MG/DL (ref 70–130)
GLUCOSE BLDC GLUCOMTR-MCNC: 337 MG/DL (ref 70–130)
GLUCOSE BLDC GLUCOMTR-MCNC: 347 MG/DL (ref 70–130)
HCT VFR BLD AUTO: 36.7 % (ref 34–46.6)
HGB BLD-MCNC: 11.8 G/DL (ref 12–15.9)
HOLD SPECIMEN: NORMAL
IMM GRANULOCYTES # BLD AUTO: 0.04 10*3/MM3 (ref 0–0.05)
IMM GRANULOCYTES NFR BLD AUTO: 0.4 % (ref 0–0.5)
LYMPHOCYTES # BLD AUTO: 2 10*3/MM3 (ref 0.7–3.1)
LYMPHOCYTES NFR BLD AUTO: 20.8 % (ref 19.6–45.3)
MCH RBC QN AUTO: 27.8 PG (ref 26.6–33)
MCHC RBC AUTO-ENTMCNC: 32.2 G/DL (ref 31.5–35.7)
MCV RBC AUTO: 86.6 FL (ref 79–97)
MONOCYTES # BLD AUTO: 0.75 10*3/MM3 (ref 0.1–0.9)
MONOCYTES NFR BLD AUTO: 7.8 % (ref 5–12)
NEUTROPHILS NFR BLD AUTO: 6.6 10*3/MM3 (ref 1.7–7)
NEUTROPHILS NFR BLD AUTO: 68.6 % (ref 42.7–76)
NRBC BLD AUTO-RTO: 0 /100 WBC (ref 0–0.2)
PLATELET # BLD AUTO: 422 10*3/MM3 (ref 140–450)
PMV BLD AUTO: 9.3 FL (ref 6–12)
RBC # BLD AUTO: 4.24 10*6/MM3 (ref 3.77–5.28)
WBC NRBC COR # BLD: 9.62 10*3/MM3 (ref 3.4–10.8)

## 2023-06-02 PROCEDURE — 63710000001 INSULIN DETEMIR PER 5 UNITS: Performed by: HOSPITALIST

## 2023-06-02 PROCEDURE — 63710000001 INSULIN DETEMIR PER 5 UNITS: Performed by: UROLOGY

## 2023-06-02 PROCEDURE — 25010000002 HEPARIN (PORCINE) PER 1000 UNITS: Performed by: UROLOGY

## 2023-06-02 PROCEDURE — 97535 SELF CARE MNGMENT TRAINING: CPT

## 2023-06-02 PROCEDURE — 63710000001 INSULIN ASPART PER 5 UNITS: Performed by: UROLOGY

## 2023-06-02 PROCEDURE — 85025 COMPLETE CBC W/AUTO DIFF WBC: CPT | Performed by: HOSPITALIST

## 2023-06-02 PROCEDURE — 63710000001 ONDANSETRON PER 8 MG: Performed by: UROLOGY

## 2023-06-02 PROCEDURE — 82948 REAGENT STRIP/BLOOD GLUCOSE: CPT

## 2023-06-02 PROCEDURE — 25010000002 ONDANSETRON PER 1 MG: Performed by: UROLOGY

## 2023-06-02 PROCEDURE — 97530 THERAPEUTIC ACTIVITIES: CPT

## 2023-06-02 RX ORDER — DICYCLOMINE HCL 20 MG
1 TABLET ORAL EVERY 6 HOURS
COMMUNITY
Start: 2023-05-05

## 2023-06-02 RX ADMIN — Medication 10 ML: at 21:36

## 2023-06-02 RX ADMIN — ISOSORBIDE MONONITRATE 30 MG: 30 TABLET, EXTENDED RELEASE ORAL at 08:41

## 2023-06-02 RX ADMIN — CLOPIDOGREL BISULFATE 75 MG: 75 TABLET ORAL at 08:41

## 2023-06-02 RX ADMIN — GABAPENTIN 200 MG: 100 CAPSULE ORAL at 08:41

## 2023-06-02 RX ADMIN — HEPARIN SODIUM 5000 UNITS: 5000 INJECTION INTRAVENOUS; SUBCUTANEOUS at 13:58

## 2023-06-02 RX ADMIN — VENLAFAXINE HYDROCHLORIDE 75 MG: 75 CAPSULE, EXTENDED RELEASE ORAL at 08:42

## 2023-06-02 RX ADMIN — RANOLAZINE 500 MG: 500 TABLET, FILM COATED, EXTENDED RELEASE ORAL at 21:26

## 2023-06-02 RX ADMIN — TAMSULOSIN HYDROCHLORIDE 0.4 MG: 0.4 CAPSULE ORAL at 08:42

## 2023-06-02 RX ADMIN — HYDROCODONE BITARTRATE AND ACETAMINOPHEN 1 TABLET: 10; 325 TABLET ORAL at 21:31

## 2023-06-02 RX ADMIN — INSULIN ASPART 10 UNITS: 100 INJECTION, SOLUTION INTRAVENOUS; SUBCUTANEOUS at 08:42

## 2023-06-02 RX ADMIN — SUCRALFATE 1 G: 1 TABLET ORAL at 17:31

## 2023-06-02 RX ADMIN — FUROSEMIDE 40 MG: 40 TABLET ORAL at 08:42

## 2023-06-02 RX ADMIN — HYDROCHLOROTHIAZIDE 12.5 MG: 12.5 TABLET ORAL at 08:42

## 2023-06-02 RX ADMIN — PANTOPRAZOLE SODIUM 40 MG: 40 TABLET, DELAYED RELEASE ORAL at 08:41

## 2023-06-02 RX ADMIN — INSULIN DETEMIR 30 UNITS: 100 INJECTION, SOLUTION SUBCUTANEOUS at 08:44

## 2023-06-02 RX ADMIN — HYDROCODONE BITARTRATE AND ACETAMINOPHEN 1 TABLET: 10; 325 TABLET ORAL at 11:00

## 2023-06-02 RX ADMIN — Medication 1 APPLICATION: at 21:26

## 2023-06-02 RX ADMIN — SUCRALFATE 1 G: 1 TABLET ORAL at 11:39

## 2023-06-02 RX ADMIN — INSULIN ASPART 10 UNITS: 100 INJECTION, SOLUTION INTRAVENOUS; SUBCUTANEOUS at 11:00

## 2023-06-02 RX ADMIN — AMLODIPINE BESYLATE 5 MG: 5 TABLET ORAL at 08:41

## 2023-06-02 RX ADMIN — Medication 10 ML: at 08:42

## 2023-06-02 RX ADMIN — ASPIRIN 325 MG: 325 TABLET, COATED ORAL at 08:41

## 2023-06-02 RX ADMIN — LEVOTHYROXINE SODIUM 50 MCG: 50 TABLET ORAL at 05:28

## 2023-06-02 RX ADMIN — GABAPENTIN 200 MG: 100 CAPSULE ORAL at 21:26

## 2023-06-02 RX ADMIN — SUCRALFATE 1 G: 1 TABLET ORAL at 21:26

## 2023-06-02 RX ADMIN — FOLIC ACID 1000 MCG: 1 TABLET ORAL at 08:41

## 2023-06-02 RX ADMIN — Medication 1 APPLICATION: at 08:43

## 2023-06-02 RX ADMIN — DOCUSATE SODIUM 50 MG AND SENNOSIDES 8.6 MG 2 TABLET: 8.6; 5 TABLET, FILM COATED ORAL at 08:41

## 2023-06-02 RX ADMIN — SUCRALFATE 1 G: 1 TABLET ORAL at 08:41

## 2023-06-02 RX ADMIN — ARIPIPRAZOLE 5 MG: 5 TABLET ORAL at 08:42

## 2023-06-02 RX ADMIN — INSULIN ASPART 8 UNITS: 100 INJECTION, SOLUTION INTRAVENOUS; SUBCUTANEOUS at 17:31

## 2023-06-02 RX ADMIN — HEPARIN SODIUM 5000 UNITS: 5000 INJECTION INTRAVENOUS; SUBCUTANEOUS at 21:26

## 2023-06-02 RX ADMIN — HEPARIN SODIUM 5000 UNITS: 5000 INJECTION INTRAVENOUS; SUBCUTANEOUS at 05:27

## 2023-06-02 RX ADMIN — ATORVASTATIN CALCIUM 80 MG: 40 TABLET, FILM COATED ORAL at 08:41

## 2023-06-02 RX ADMIN — DOCUSATE SODIUM 50 MG AND SENNOSIDES 8.6 MG 2 TABLET: 8.6; 5 TABLET, FILM COATED ORAL at 21:30

## 2023-06-02 RX ADMIN — ONDANSETRON HYDROCHLORIDE 4 MG: 4 TABLET, FILM COATED ORAL at 16:20

## 2023-06-02 RX ADMIN — METOCLOPRAMIDE 10 MG: 10 TABLET ORAL at 21:36

## 2023-06-02 RX ADMIN — INSULIN DETEMIR 45 UNITS: 100 INJECTION, SOLUTION SUBCUTANEOUS at 21:27

## 2023-06-02 RX ADMIN — Medication 10 ML: at 08:44

## 2023-06-02 RX ADMIN — ONDANSETRON 4 MG: 2 INJECTION INTRAMUSCULAR; INTRAVENOUS at 08:42

## 2023-06-02 RX ADMIN — RANOLAZINE 500 MG: 500 TABLET, FILM COATED, EXTENDED RELEASE ORAL at 08:42

## 2023-06-02 NOTE — PLAN OF CARE
"Goal Outcome Evaluation:  Plan of Care Reviewed With: patient           Outcome Evaluation: nurising ok'd ot pt agreeable only to grooming act c/o \"stomach \"pain, after groming pt request to end tx as she is not feeling well.  cont poc         "

## 2023-06-02 NOTE — THERAPY TREATMENT NOTE
Acute Care - Physical Therapy Treatment Note  [facility]     Patient Name: Ariana Martinez  : 1960  MRN: [id number]  Today's Date: 2023      Visit Dx:     ICD-10-CM ICD-9-CM   1. Sepsis without acute organ dysfunction, due to unspecified organism  A41.9 038.9     995.91   2. Cellulitis of right lower extremity  L03.115 682.6   3. Type 2 diabetes mellitus with hyperglycemia, unspecified whether long term insulin use  E11.65 250.00   4. Impaired mobility and activities of daily living  Z74.09 V49.89    Z78.9    5. Impaired physical mobility  Z74.09 781.99   6. Impaired mobility and ADLs  Z74.09 V49.89    Z78.9    7. Urinary retention  R33.9 788.20     Patient Active Problem List   Diagnosis   • 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, with long-term current use of insulin   • Closed nondisplaced fracture of fifth left metatarsal bone   • MAURICIO (generalized anxiety disorder)   • Depression, major, recurrent, moderate ([facility])   • GERD without esophagitis   • Long term prescription opiate use   • Mixed hyperlipidemia   • Vitamin D deficiency   • Seasonal allergic rhinitis   • Restrictive lung disease secondary to obesity   • Adult body mass index 37.0-37.9   • Snoring   • Class 3 severe obesity due to excess calories without serious comorbidity with body mass index (BMI) of 40.0 to 44.9 in adult ([facility])   • (HFpEF) heart failure with preserved ejection fraction   • Type 2 diabetes mellitus with hyperglycemia, with long-term current use of insulin ([facility])   • Cyanocobalamin deficiency   • Coronary artery disease of native artery of native heart with stable angina pectoris   • Hypertension   • Meniere's disease   • Gastroparesis   • Pulmonary hypertension ([facility])   • Pes cavus   • Primary osteoarthritis involving multiple joints   • Generalized anxiety disorder   • Chronic right-sided low back pain with right-sided sciatica   • Chest pain   • Adverse effect of iron   • Chest pain due to  myocardial ischemia   • Nonrheumatic tricuspid valve regurgitation   • Iron deficiency anemia due to chronic blood loss   • Malaise and fatigue   • Ankle arthritis   • Urinary retention   • Endocarditis   • Essential hypertension   • Occlusion and stenosis of bilateral carotid arteries   • S/P BKA (below knee amputation) unilateral, left (HCC)   • Phantom pain after amputation of lower extremity   • S/P CABG (coronary artery bypass graft)   • Severe malnutrition   • TIA (transient ischemic attack)   • Coronary artery abnormality   • Elevated d-dimer   • Neuropathy   • Chest pain, unspecified type   • Acute pain of right shoulder   • Rotator cuff syndrome, right   • Acquired hypothyroidism   • Bilateral leg edema   • Left elbow pain   • Gastritis   • Olecranon bursitis, left elbow   • Sepsis without acute organ dysfunction, due to unspecified organism     Past Medical History:   Diagnosis Date   • Acute bacterial endocarditis 3/13/2021   • Angina, class IV    • Anxiety    • Anxiety and depression    • Arthritis    • Benign paroxysmal positional vertigo    • Bladder disorder     has bladder stimulator   • Carpal tunnel syndrome    • CHF (congestive heart failure)    • Chronic pain    • Coronary atherosclerosis     hx CABG 2005.  is followed by Dr Kwon   • Depression    • Diabetes mellitus     Type 2, controlled   • Diabetic polyneuropathy    • Disease of thyroid gland    • Elevated cholesterol    • Female stress incontinence    • Foot pain, left    • Full dentures    • Gastroparesis    • GERD (gastroesophageal reflux disease)    • Hyperlipidemia    • Hypertension    • Low back pain    • Malaise and fatigue    • Multiple joint pain    • Obesity     Refuses to be weighed   • Occlusion and stenosis of bilateral carotid arteries 6/18/2021   • Otalgia     Both   • Perforation of tympanic membrane     Left   • Postoperative wound infection    • Vitamin D deficiency    • Wears glasses     reading     Past Surgical  History:   Procedure Laterality Date   • ABDOMINAL SURGERY     • AMPUTATION FOOT     • ANGIOPLASTY      coronary   • ANKLE ARTHROSCOPY Left 02/26/2021    Procedure: Left foot hardware removal, ankle arthroscopy, bone grafting , left foot exostectomy;  Surgeon: Ignacio Lord DPM;  Location: [facility] OR;  Service: Podiatry;  Laterality: Left;   • BREAST BIOPSY Right    • CARDIAC CATHETERIZATION     • CARDIAC CATHETERIZATION N/A 06/20/2017    Procedure: Right Heart Cath;  Surgeon: Can Kwon MD PhD;  Location: [facility] CATH INVASIVE LOCATION;  Service:    • CARDIAC CATHETERIZATION N/A 02/18/2020    Procedure: Left Heart Cath;  Surgeon: Catalina Cooper MD;  Location: [facility] CATH INVASIVE LOCATION;  Service: Cardiology;  Laterality: N/A;   • CARDIAC CATHETERIZATION N/A 04/06/2022    Procedure: Left Heart Cath;  Surgeon: Sheryl Navas MD;  Location: [facility] CATH INVASIVE LOCATION;  Service: Cardiology;  Laterality: N/A;   • CARPAL TUNNEL RELEASE     • CHOLECYSTECTOMY     • COLONOSCOPY N/A 06/24/2020    Procedure: COLONOSCOPY;  Surgeon: Julián Maldonado MD;  Location: [facility] ENDOSCOPY;  Service: Gastroenterology;  Laterality: N/A;   • CORONARY ARTERY BYPASS GRAFT  2005   • ENDOSCOPY N/A 10/19/2018    Procedure: ESOPHAGOGASTRODUODENOSCOPY possible dilation;  Surgeon: Julián Maldonado MD;  Location: [facility] ENDOSCOPY;  Service: Gastroenterology   • ENDOSCOPY N/A 06/24/2020    Procedure: ESOPHAGOGASTRODUODENOSCOPY WED appt please;  Surgeon: Julián Maldonado MD;  Location: [facility] ENDOSCOPY;  Service: Gastroenterology;  Laterality: N/A;   • ENDOSCOPY N/A 06/10/2022    Procedure: ESOPHAGOGASTRODUODENOSCOPY   room 380;  Surgeon: Jeremiah Wilkins MD;  Location: [facility] ENDOSCOPY;  Service: Gastroenterology;  Laterality: N/A;   • ENDOSCOPY N/A 1/10/2023    Procedure: ESOPHAGOGASTRODUODENOSCOPY;  Surgeon: Jeremiah Wilkins MD;  Location: [facility] ENDOSCOPY;  Service: Gastroenterology;  Laterality: N/A;   •  ENDOSCOPY AND COLONOSCOPY     • FOOT SURGERY      Toes   • FOOT SURGERY     • GASTRIC BANDING      Revision, laparoscopic   • HYSTERECTOMY     • INCISION AND DRAINAGE LEG Left 2021    Procedure: Left ankle arthroscopic irrigation and debridement, screw removal;  Surgeon: Ignacio Lord DPM;  Location: [facility];  Service: Podiatry;  Laterality: Left;   • MOUTH SURGERY     • SALPINGO OOPHORECTOMY     • SHOULDER SURGERY     • SUBTALAR ARTHRODESIS Left 2019    Procedure: LEFT FOOT HARDWARE REMOVAL, FIFTH METATARSAL , OPEN REDUCTION INTERNAL FIXATION, CALCANEAL OSTEOTOMY;  Surgeon: Ignacio Lord DPM;  Location: [facility];  Service: Podiatry   • SUBTALAR ARTHRODESIS Left 10/16/2019    Procedure: foot hardware removal, subtalar joint fusion  possible de/reattachment of achilles tendon        (c-arm);  Surgeon: Ignacio Lord DPM;  Location: [facility];  Service: Podiatry   • SUBTALAR ARTHRODESIS Left 2020    Procedure: subtalar, talonavicular joint arthrodesis.  Removal hardware.          (c-arm);  Surgeon: Ignacio Lord DPM;  Location: [facility];  Service: Podiatry;  Laterality: Left;   • TRANSESOPHAGEAL ECHOCARDIOGRAM (LAMONTE)      With color flow     PT Assessment (last 12 hours)     PT Evaluation and Treatment     [facility] Name 23 140          Physical Therapy Time and Intention    Document Type therapy note (daily note)  -     Mode of Treatment physical therapy;individual therapy  -     Patient Effort good  -     Comment pt defers tx 2' feeling stomach pain; however, pt on b/s commode and wanting to return to bed  -     Row Name 23          General Information    Patient Profile Reviewed yes  -     Existing Precautions/Restrictions fall  -     Row Name 23 140          Pain    Pre/Posttreatment Pain Comment low stomach pain, no rating given  -     Row Name 23          Cognition    Orientation Status (Cognition) oriented to;person     -JW     Row Name 06/02/23 1407          Bed Mobility    Rolling Left [place] (Bed Mobility) modified independence  -JW     Rolling Right [place] (Bed Mobility) modified independence  -JW     Scooting/Bridging [place] (Bed Mobility) standby assist  -JW     Sit-Supine [place] (Bed Mobility) modified independence  -JW     Assistive Device (Bed Mobility) bed rails;head of bed elevated  -JW     Row Name 06/02/23 1407          Sit-Stand Transfer    Sit-Stand [place] (Transfers) contact guard  -JW     Row Name 06/02/23 1407          Stand-Sit Transfer    Stand-Sit [place] (Transfers) contact guard  -JW     Row Name 06/02/23 1407          Toilet Transfer    Type (Toilet Transfer) stand pivot/stand step  -JW     [place] Level (Toilet Transfer) contact guard  -JW     Assistive Device (Toilet Transfer) commode, bedside with drop arms;walker, front-wheeled  -JW     Comment, (Toilet Transfer) pt on b/s commode, performs st. pivot b/s commode to bed  -JW     Row Name 06/02/23 1407          Gait/Stairs (Locomotion)    Comment, (Gait/Stairs) defers gt  -JW     Row Name 06/02/23 1407          Motor Skills    Comments, Therapeutic Exercise defers ther ex  -JW     Row Name             Wound 05/17/23 1828 Right anterior fifth toe    Wound - Properties Group Placement Date: 05/17/23  - Placement Time: 1828 -JH Side: Right  -JH Orientation: anterior  -JH Location: fifth toe  -JH    Retired Wound - Properties Group Placement Date: 05/17/23  - Placement Time: 1828  -JH Side: Right  -JH Orientation: anterior  -JH Location: fifth toe  -JH    Retired Wound - Properties Group Date first assessed: 05/17/23  - Time first assessed: 1828  -JH Side: Right  -JH Location: fifth toe  -JH    Row Name             Wound 05/23/23 1006 Right distal leg    Wound - Properties Group Placement Date: 05/23/23  -LH Placement Time: 1006  -LH Present on Hospital Admission: N  -LH Side: Right  -LH Orientation: distal  -LH  Location: leg  -LH    Retired Wound - Properties Group Placement Date: 05/23/23  - Placement Time: 1006  -LH Present on Hospital Admission: N  -LH Side: Right  -LH Orientation: distal  -LH Location: leg  -LH    Retired Wound - Properties Group Date first assessed: 05/23/23  - Time first assessed: 1006  -LH Present on Hospital Admission: N  -LH Side: Right  -LH Location: leg  -LH    Row Name 06/02/23 1407          Plan of Care Review    Plan of Care Reviewed With patient  -     Outcome Evaluation pt defers gt and ther ex 2' low stomach pain, pt on b/s commode and wanting to return to bed, t/fers sit<>stand w/ CGA, st. pivot b/s commode to bed, pt t/fers sit to sup, rolling L and R w/ Mod Ind, scoot up in bed w/ SBA for vc's  -     Row Name 06/02/23 1407          Vital Signs    Pre Patient Position Sitting  -     Intra Patient Position Standing  -JW     Post Patient Position Supine  -     Row Name 06/02/23 1407          Positioning and Restraints    Pre-Treatment Position bedside commode  -JW     Post Treatment Position bed  -JW     In Bed fowlers;call light within reach;encouraged to call for assist;exit alarm on  pillow under each hip  -     Row Name 06/02/23 1407          Therapy Assessment/Plan (PT)    Rehab Potential (PT) good, to achieve stated therapy goals  -     Row Name 06/02/23 1407          Bed Mobility Goal 1 (PT)    Activity/Assistive Device (Bed Mobility Goal 1, PT) bed mobility activities, all  -     [place] Level/Cues Needed (Bed Mobility Goal 1, PT) standby assist;independent  -     Time Frame (Bed Mobility Goal 1, PT) by discharge  -     Progress/Outcomes (Bed Mobility Goal 1, PT) goal met  -     Row Name 06/02/23 1407          Transfer Goal 1 (PT)    Activity/Assistive Device (Transfer Goal 1, PT) sit-to-stand/stand-to-sit;bed-to-chair/chair-to-bed  -     [place] Level/Cues Needed (Transfer Goal 1, PT) contact guard required;standby assist;modified independence   -     Time Frame (Transfer Goal 1, PT) by discharge  -JW     Progress/Outcome (Transfer Goal 1, PT) goal partially met;goal ongoing  partially met with CGA with sit to stand to sit  -     Row Name 06/02/23 1407          Gait Training Goal 1 (PT)    Activity/Assistive Device (Gait Training Goal 1, PT) gait (walking locomotion);decrease fall risk  -     [place] Level (Gait Training Goal 1, PT) contact guard required;standby assist;modified independence  -     Distance (Gait Training Goal 1, PT) 50ft or more each trip  -     Time Frame (Gait Training Goal 1, PT) 1 week  -     Progress/Outcome (Gait Training Goal 1, PT) goal not met  -     Row Name 06/02/23 1407          ROM Goal 1 (PT)    ROM Goal 1 (PT) Pt will tolerate LE exercises OOB in chair with VSS  -     Time Frame (ROM Goal 1, PT) by discharge  -     Progress/Outcome (ROM Goal 1, PT) goal met  -           User Key  (r) = Recorded By, (t) = Taken By, (c) = Cosigned By    Initials Name Provider Type    JW Rowena Billings, PTA Physical Therapist Assistant    Juany Sage, RN Registered Nurse    Janae Severino LPN Licensed Nurse                Physical Therapy Education     Title: PT OT SLP Therapies (Done)     Topic: Physical Therapy (Done)     Point: Mobility training (Done)     Learning Progress Summary           Patient Acceptance, E, VU by AB at 6/1/2023 0535    Acceptance, E, VU by AB at 5/31/2023 0519    Acceptance, E, NR by AME at 5/29/2023 1123    Acceptance, E, NR by AME at 5/26/2023 1356    Acceptance, E,TB, VU by LW at 5/25/2023 1459    Acceptance, E, NR by AME at 5/24/2023 1306    Acceptance, E,TB, VU by NB at 5/18/2023 2138    Acceptance, E, NR by JC1 at 5/18/2023 1407                   Point: Home exercise program (Done)     Learning Progress Summary           Patient Acceptance, E, VU by AB at 6/1/2023 0535    Acceptance, E, VU by AB at 5/31/2023 0519    Acceptance, E, VU by AB at 5/30/2023 0516    Acceptance,  E,TB, VU by LW at 5/25/2023 1459                   Point: Body mechanics (Done)     Learning Progress Summary           Patient Acceptance, E, VU by AB at 6/1/2023 0535    Acceptance, E, VU by AB at 5/31/2023 0519    Acceptance, E, VU by AB at 5/30/2023 0516    Acceptance, E,TB, VU by  at 5/25/2023 1459    Acceptance, E,TB, VU by  at 5/25/2023 1451                   Point: Precautions (Done)     Learning Progress Summary           Patient Acceptance, E, VU by AB at 6/1/2023 0535    Acceptance, E, VU by AB at 5/31/2023 0519    Acceptance, E, VU by AB at 5/30/2023 0516    Acceptance, E,TB, VU by  at 5/25/2023 1459    Acceptance, E, NR by [facility] at 5/18/2023 1407                               User Key     Initials Effective Dates Name Provider Type Discipline    [facility] 06/16/21 -  Aure Villaseñor, PT Physical Therapist PT     06/16/21 -  Ousmane Zhang PTA Physical Therapist Assistant PT     06/16/21 -  Sarah Irby COTA Occupational Therapist Assistant OT    NB 06/16/21 -  Samantha Cuellar RN Registered Nurse Nurse     09/08/22 -  Janae Amaya LPN Licensed Nurse Nurse    AB 11/07/22 -  Joana Mc LPN Licensed Nurse Nurse              PT Recommendation and Plan  Anticipated Discharge Disposition (PT): inpatient rehabilitation facility  Plan of Care Reviewed With: patient  Outcome Evaluation: pt defers gt and ther ex 2' low stomach pain, pt on b/s commode and wanting to return to bed, t/fers sit<>stand w/ CGA, st. pivot b/s commode to bed, pt t/fers sit to sup, rolling L and R w/ Mod Ind, scoot up in bed w/ SBA for vc's   Outcome Measures     Row Name 06/01/23 0900             How much help from another person do you currently need...    Turning from your back to your side while in flat bed without using bedrails? 4  -RW      Moving from lying on back to sitting on the side of a flat bed without bedrails? 4  -RW      Moving to and from a bed to a chair (including a wheelchair)? 3  -RW       Standing up from a chair using your arms (e.g., wheelchair, bedside chair)? 3  -RW      Climbing 3-5 steps with a railing? 2  -RW      To walk in hospital room? 3  -RW      AM-PAC 6 Clicks Score (PT) 19  -RW            User Key  (r) = Recorded By, (t) = Taken By, (c) = Cosigned By    Initials Name Provider Type     Sergio Fuentes PTA Physical Therapist Assistant                 Time Calculation:    PT Charges     Row Name 06/02/23 1407             Time Calculation    Start Time 1407  -      Stop Time 1416  -      Time Calculation (min) 9 min  -      PT Received On 06/02/23  -         Time Calculation- PT    Total Timed Code Minutes- PT 9 minute(s)  -            User Key  (r) = Recorded By, (t) = Taken By, (c) = Cosigned By    Initials Name Provider Type     Rowena Billings PTA Physical Therapist Assistant              Therapy Charges for Today     Code Description Service Date Service Provider Modifiers Qty    [id number]  PT THERAPEUTIC ACT EA 15 MIN 6/2/2023 Rowena Billings PTA GP 1          PT G-Codes  Outcome Measure Options: AM-PAC 6 Clicks Daily Activity (OT)  AM-PAC 6 Clicks Score (PT): 12  AM-PAC 6 Clicks Score (OT): 21    Rowena Billings PTA  6/2/2023

## 2023-06-02 NOTE — PROGRESS NOTES
[facility] Medicine Services  INPATIENT PROGRESS NOTE      Length of Stay: 16  Date of Admission: 5/17/2023  Primary Care Physician: Dolly Foss APRN    Subjective   Chief Complaint: Nausea with eating  HPI:    62yo female pmh significant htn/hyperlipidemia/dm2/hypothyroid/cad/HFpEF/obesity presents ED via EMS c/o 1d hx fever/chills/generalized weakness.  ROS (+) nonproductive cough.  Denies sore throat/rhinorrhea/chest pain/soa/abd pain/n/v/d/dysuria.    Daily assessment 6/2/2023  Remains stable on exam.  Denies any new problems or complaints.  She does state that she is feeling better daily.  She does remain tired fatigue.  Remains weak.  No fevers or chills.  No nausea or vomiting.  Vital signs are stable patient is afebrile.  Is agreeable to go to ARU versus skilled nursing facility on discharge    Review of Systems   Constitutional: Positive for fatigue.   Respiratory: Negative for shortness of breath.    Cardiovascular: Negative for chest pain.   Neurological: Positive for weakness.        All pertinent negatives and positives are as above. All other systems have been reviewed and are negative unless otherwise stated.     Objective    As of today 06/02/23  Temp:  [97 °F (36.1 °C)-98.7 °F (37.1 °C)] 98.7 °F (37.1 °C)  Heart Rate:  [76-87] 76  Resp:  [18-20] 18  BP: ()/(45-79) 109/79    Physical Exam  Vitals and nursing note reviewed.   Constitutional:       Appearance: She is well-developed.   HENT:      Head: Normocephalic and atraumatic.      Right Ear: External ear normal.      Left Ear: External ear normal.      Mouth/Throat:      Pharynx: Oropharynx is clear.   Eyes:      Extraocular Movements: Extraocular movements intact.      Conjunctiva/sclera: Conjunctivae normal.      Pupils: Pupils are equal, round, and reactive to light.   Cardiovascular:      Rate and Rhythm: Normal rate and regular rhythm.      Heart sounds: Normal heart sounds. No  murmur heard.    No friction rub. No gallop.   Pulmonary:      Effort: Pulmonary effort is normal. No respiratory distress.      Breath sounds: Normal breath sounds. No wheezing or rales.   Chest:      Chest wall: No tenderness.   Abdominal:      General: Bowel sounds are normal. There is no distension.      Palpations: Abdomen is soft.      Tenderness: There is no abdominal tenderness. There is no guarding.   Musculoskeletal:      Cervical back: Normal range of motion and neck supple.      Comments: Left BKA   Skin:     General: Skin is warm and dry.      Comments: Right lower extremity erythema improving.  Blister on dorsum of right foot.  Ruptured blister on medial aspect of right distal lower extremity.   Neurological:      Motor: Weakness present.   Psychiatric:         Behavior: Behavior normal.         Thought Content: Thought content normal.           amLODIPine, 5 mg, Oral, Daily  ARIPiprazole, 5 mg, Oral, Daily  aspirin EC, 325 mg, Oral, Daily  atorvastatin, 80 mg, Oral, Daily  Bag Balm, 1 application, Topical, BID  clopidogrel, 75 mg, Oral, Daily  cyanocobalamin, 1,000 mcg, Intramuscular, Q28 Days  folic acid, 1,000 mcg, Oral, Daily  furosemide, 40 mg, Oral, Daily  gabapentin, 200 mg, Oral, Q12H  heparin (porcine), 5,000 Units, Subcutaneous, Q8H  hydroCHLOROthiazide, 12.5 mg, Oral, Daily  Insulin Aspart, 0-14 Units, Subcutaneous, TID AC  insulin detemir, 30 Units, Subcutaneous, Daily  insulin detemir, 45 Units, Subcutaneous, Nightly  isosorbide mononitrate, 30 mg, Oral, Daily  levothyroxine, 50 mcg, Oral, QAM  pantoprazole, 40 mg, Oral, Daily  ranolazine, 500 mg, Oral, Q12H  senna-docusate sodium, 2 tablet, Oral, BID  sodium chloride, 10 mL, Intravenous, Q12H  sodium chloride, 10 mL, Intravenous, Q12H  sucralfate, 1 g, Oral, 4x Daily  tamsulosin, 0.4 mg, Oral, Daily  venlafaxine XR, 75 mg, Oral, Daily With Breakfast         Results Review:  I have reviewed the labs, radiology results, and diagnostic  studies.    Laboratory Data:   Results from last 7 days   Lab Units 05/28/23  0654   SODIUM mmol/L 136   POTASSIUM mmol/L 4.0   CHLORIDE mmol/L 98   CO2 mmol/L 28.0   BUN mg/dL 12   CREATININE mg/dL 0.92   GLUCOSE mg/dL 295*   CALCIUM mg/dL 9.3   ANION GAP mmol/L 10.0     Estimated Creatinine Clearance: 87.1 mL/min (by C-G formula based on SCr of 0.92 mg/dL).          Results from last 7 days   Lab Units 06/02/23  0533 06/01/23  0603 05/31/23  0538 05/30/23  0639 05/29/23  0525   WBC 10*3/mm3 9.62 9.82 9.31 11.11* 8.86   HEMOGLOBIN g/dL 11.8* 12.3 11.8* 12.1 11.4*   HEMATOCRIT % 36.7 38.0 34.7 37.4 35.0   PLATELETS 10*3/mm3 422 460* 416 520* 525*           Culture Data:   No results found for: BLOODCX  No results found for: URINECX  No results found for: RESPCX  No results found for: WOUNDCX  No results found for: STOOLCX  No components found for: BODYFLD    Radiology Data:   Imaging Results (Last 24 Hours)     ** No results found for the last 24 hours. **          I have reviewed the patient's current medications.     Assessment/Plan     Principal Problem:    Sepsis without acute organ dysfunction, due to unspecified organism  Active Problems:    Urinary retention      1.  Right lower extremity cellulitis:   Improving nicely.    Completed antibiotic course.    Wound care following.  Continue current treatment.  Remained stable no new issues or concerns    2.  Urinary retention: Appreciate urology help.  Urinary retention is resolved     3.  Hypertension:   Current blood pressure is 109/79  O2 saturation 92% room air on   Continue amlodipine, Lasix, hydrochlorothiazide    4.  Coronary artery disease:   Stable continue atorvastatin, Plavix, Imdur, Ranexa    5.  Diabetes mellitus:  Continue Levemir, sliding scale and ADA diet    Monitor.      6.  Deconditioning:   Continue PT/OT.  Patient is doing some better, but still feels that she needs rehab prior to home.     DVT prophylaxis: Heparin.  CODE STATUS full  code    Awaiting acceptance to ARU.  Have discussed case with case management.  If ARU is not accept the patient it would be advisable to discharge the patient to skilled nursing facility.  Case management indicated that the patient is not agreeable to skilled nursing facility however if that is the only safe option should be explored.    Medical Decision Making  Number and Complexity of problems: Several highly complex medical problems     Conditions and Status:        Condition is improving.        Discussed with: Patient and      Treatment Plan  As above     Care Planning  Shared decision making: Patient in agreement with plan of care  Code status and discussions: Full code     Disposition  Social Determinants of Health that impact treatment or disposition: None  I expect the patient to be discharged to home in 1-2 days.         The patient was evaluated during the global COVID-19 pandemic, and the diagnosis was suspected/considered upon their initial presentation.  Evaluation, treatment, and testing were consistent with current guidelines for patients who present with complaints or symptoms that may be related to COVID-19.     I confirmed that the patient's Advance Care Plan is present, code status is documented, or surrogate decision maker is listed in the patient's medical record.      Reji Mendoza, DO

## 2023-06-02 NOTE — PAYOR COMM NOTE
"Linda Barnard RN [facility]  [phone]     Phone  [phone]      Fax  Cont. Stay Review    Ariana Bailey (63 y.o. Female)     Date of Birth   1960    Social Security Number       Address   [address] [facility] [address]    Home Phone   [phone]    MRN   [id number]       Mormonism   Jew    Marital Status                               Admission Date   5/17/23    Admission Type   Emergency    Admitting Provider       Attending Provider   Santy Rodriguez MD    Department, Room/Bed   [facility] [address] [place], 411/1       Discharge Date       Discharge Disposition       Discharge Destination                               Attending Provider: Santy Rodriguez MD    Allergies: Seroquel [Quetiapine], Avandia [Rosiglitazone], Morphine And Related, Oxycodone    Isolation: None   Infection: None   Code Status: CPR    Ht: 172.7 cm (68\")   Wt: 119 kg (261 lb 8 oz)    Admission Cmt: None   Principal Problem: Sepsis without acute organ dysfunction, due to unspecified organism [A41.9]                 Active Insurance as of 5/17/2023     Primary Coverage     Payor Plan Insurance Group Employer/Plan Group    [facility] BLUE [employer] [facility] [employer] EMPLOYEE [id number]     Payor Plan Address Payor Plan Phone Number Payor Plan Fax Number Effective Dates    [address] [phone]  1/1/2022 - None Entered    [place] [address]       Subscriber Name Subscriber Birth Date Member ID       ARIANA BAILEY 1960 [id number]                 Emergency Contacts      (Rel.) Home Phone Work Phone Mobile Phone    Nadeem Bailey (Spouse) [phone] -- [phone]    Елена David (Sister) [phone] -- [phone]            Vital Signs (last day)     Date/Time Temp Temp src Pulse Resp BP Patient Position SpO2    06/02/23 1053 98.7 (37.1) Temporal 76 18 109/79 Lying 92    06/02/23 0742 98.1 (36.7) -- " 79 20 131/51 -- 92    06/02/23 0734 -- -- 79 -- -- -- --    06/02/23 0314 98.4 (36.9) Oral 87 18 132/61 Lying 98    06/02/23 0126 -- -- 78 -- -- -- --    06/01/23 2042 97.8 (36.6) Temporal 84 18 91/45 Lying 92    06/01/23 1553 97 (36.1) Temporal 83 18 118/57 Lying 93    06/01/23 1542 -- -- 79 -- -- -- --    06/01/23 1114 98 (36.7) Temporal 83 18 126/54 Sitting 94    06/01/23 0802 -- -- 79 -- -- -- --    06/01/23 0734 97.8 (36.6) Temporal 77 18 128/68 -- 90    06/01/23 0305 97.2 (36.2) Temporal 82 18 118/73 Lying 94    06/01/23 0126 -- -- 82 -- -- -- --          Oxygen Therapy (last day)     Date/Time SpO2 Device (Oxygen Therapy) Flow (L/min) Oxygen Concentration (%) ETCO2 (mmHg)    06/02/23 1053 92 -- -- -- --    06/02/23 0742 92 -- -- -- --    06/02/23 0314 98 room air -- -- --    06/01/23 2050 -- room air -- -- --    06/01/23 2042 92 room air -- -- --    06/01/23 1553 93 room air -- -- --    06/01/23 1114 94 room air -- -- --    06/01/23 0734 90 room air -- -- --    06/01/23 0305 94 room air -- -- --          Current Facility-Administered Medications   Medication Dose Route Frequency Provider Last Rate Last Admin   • acetaminophen (TYLENOL) tablet 650 mg  650 mg Oral Q4H PRN Rohan Doe MD   650 mg at 05/24/23 2026   • aluminum-magnesium hydroxide-simethicone (MAALOX MAX) 400-400-40 MG/5ML suspension 15 mL  15 mL Oral Q6H PRN Rohan Doe MD       • amLODIPine (NORVASC) tablet 5 mg  5 mg Oral Daily Reji Mendoza DO   5 mg at 06/02/23 0841   • ARIPiprazole (ABILIFY) tablet 5 mg  5 mg Oral Daily Rohan Doe MD   5 mg at 06/02/23 0842   • aspirin EC tablet 325 mg  325 mg Oral Daily Rohan Doe MD   325 mg at 06/02/23 0841   • atorvastatin (LIPITOR) tablet 80 mg  80 mg Oral Daily Rohan Doe MD   80 mg at 06/02/23 0841   • Bag Balm ointment ointment 1 application  1 application Topical BID Rohan Doe MD   1 application at 06/02/23 0843   • sennosides-docusate (PERICOLACE)  8.6-50 MG per tablet 2 tablet  2 tablet Oral BID Rohan Doe MD   2 tablet at 06/02/23 0841    And   • polyethylene glycol (MIRALAX) packet 17 g  17 g Oral Daily PRN Rohan Doe MD   17 g at 05/24/23 0900    And   • bisacodyl (DULCOLAX) EC tablet 5 mg  5 mg Oral Daily PRN Rohan Doe MD   5 mg at 05/24/23 2026    And   • bisacodyl (DULCOLAX) suppository 10 mg  10 mg Rectal Daily PRN Rohan Doe MD       • clopidogrel (PLAVIX) tablet 75 mg  75 mg Oral Daily Rohan Doe MD   75 mg at 06/02/23 0841   • cyanocobalamin injection 1,000 mcg  1,000 mcg Intramuscular Q28 Days Mahin Esteves MD   1,000 mcg at 05/30/23 1727   • dextrose (D50W) (25 g/50 mL) IV injection 25 g  25 g Intravenous Q15 Min PRN Rohan Doe MD       • dextrose (GLUTOSE) oral gel 15 g  15 g Oral Q15 Min PRN Rohan Doe MD       • folic acid (FOLVITE) tablet 1,000 mcg  1,000 mcg Oral Daily Rohan Doe MD   1,000 mcg at 06/02/23 0841   • furosemide (LASIX) tablet 40 mg  40 mg Oral Daily Rohan Doe MD   40 mg at 06/02/23 0842   • gabapentin (NEURONTIN) capsule 200 mg  200 mg Oral Q12H Santy Rodriguez MD   200 mg at 06/02/23 0841   • glucagon (human recombinant) (GLUCAGEN DIAGNOSTIC) injection 1 mg  1 mg Intramuscular Q15 Min PRN Rohan Doe MD       • guaiFENesin-dextromethorphan (ROBITUSSIN DM) 100-10 MG/5ML syrup 5 mL  5 mL Oral Q4H PRN Rohan Doe MD   5 mL at 05/29/23 0838   • heparin (porcine) 5000 UNIT/ML injection 5,000 Units  5,000 Units Subcutaneous Q8H Rohan Doe MD   5,000 Units at 06/02/23 0527   • hydroCHLOROthiazide (HYDRODIURIL) tablet 12.5 mg  12.5 mg Oral Daily Rohan Doe MD   12.5 mg at 06/02/23 0842   • HYDROcodone-acetaminophen (NORCO)  MG per tablet 1 tablet  1 tablet Oral Q6H PRN Mahin Esteves MD   1 tablet at 06/02/23 1100   • Insulin Aspart (novoLOG) injection 0-14 Units  0-14 Units Subcutaneous TID AC Rohan Doe,  MD   10 Units at 06/02/23 1100   • insulin detemir (LEVEMIR) injection 30 Units  30 Units Subcutaneous Daily Mahin Esteves MD   30 Units at 06/02/23 0844   • insulin detemir (LEVEMIR) injection 45 Units  45 Units Subcutaneous Nightly Rohan Doe MD   45 Units at 06/01/23 2049   • isosorbide mononitrate (IMDUR) 24 hr tablet 30 mg  30 mg Oral Daily Rohan Doe MD   30 mg at 06/02/23 0841   • levothyroxine (SYNTHROID, LEVOTHROID) tablet 50 mcg  50 mcg Oral QAM Rohan Doe MD   50 mcg at 06/02/23 0528   • LORazepam (ATIVAN) tablet 0.5 mg  0.5 mg Oral Q8H PRN Reji Mendoza DO   0.5 mg at 06/01/23 1403   • Magnesium Standard Dose Replacement - Follow Nurse / BPA Driven Protocol   Does not apply PRN Rohan Doe MD       • metoclopramide (REGLAN) tablet 10 mg  10 mg Oral 4x Daily PRN Rohan Doe MD   10 mg at 05/31/23 2109   • naloxone (NARCAN) injection 0.4 mg  0.4 mg Intravenous Q5 Min PRN Rohan Doe MD       • nitroglycerin (NITROSTAT) SL tablet 0.4 mg  0.4 mg Sublingual Q5 Min PRN Rohan Doe MD       • ondansetron (ZOFRAN) injection 4 mg  4 mg Intravenous Q6H PRN Rohan Doe MD   4 mg at 06/02/23 0842   • ondansetron (ZOFRAN) tablet 4 mg  4 mg Oral Q6H PRN Rohan Doe MD   4 mg at 06/01/23 2050   • pantoprazole (PROTONIX) EC tablet 40 mg  40 mg Oral Daily Rohan Doe MD   40 mg at 06/02/23 0841   • ranolazine (RANEXA) 12 hr tablet 500 mg  500 mg Oral Q12H Rohan Doe MD   500 mg at 06/02/23 0842   • sodium chloride 0.9 % flush 10 mL  10 mL Intravenous PRN Rohan Doe MD       • sodium chloride 0.9 % flush 10 mL  10 mL Intravenous Q12H Rohan Doe MD   10 mL at 06/02/23 0844   • sodium chloride 0.9 % flush 10 mL  10 mL Intravenous PRN [place]       • sodium chloride 0.9 % flush 10 mL  10 mL Intravenous Q12H [place]   10 mL at 06/02/23 0842   • sodium chloride 0.9 % flush 10 mL  10 mL Intravenous PRN  Rohan Doe MD       • sodium chloride 0.9 % infusion 40 mL  40 mL Intravenous PRN Rohan Doe MD       • sodium chloride 0.9 % infusion 40 mL  40 mL Intravenous PRN Rohan Doe MD   40 mL at 05/22/23 1327   • sucralfate (CARAFATE) tablet 1 g  1 g Oral 4x Daily Rohan Doe MD   1 g at 06/02/23 0841   • tamsulosin (FLOMAX) 24 hr capsule 0.4 mg  0.4 mg Oral Daily Rohan Doe MD   0.4 mg at 06/02/23 0842   • venlafaxine XR (EFFEXOR-XR) 24 hr capsule 75 mg  75 mg Oral Daily With Breakfast Rohan Doe MD   75 mg at 06/02/23 0842        Physician Progress Notes (last 48 hours)      Reji Mendoza DO at 06/01/23 1208              [facility] Medicine Services  INPATIENT PROGRESS NOTE      Length of Stay: 15  Date of Admission: 5/17/2023  Primary Care Physician: Dolly Foss APRN    Subjective   Chief Complaint: Nausea with eating  HPI:    64yo female pmh significant htn/hyperlipidemia/dm2/hypothyroid/cad/HFpEF/obesity presents ED via EMS c/o 1d hx fever/chills/generalized weakness.  ROS (+) nonproductive cough.  Denies sore throat/rhinorrhea/chest pain/soa/abd pain/n/v/d/dysuria.    Daily assessment 6/1/2023  On evaluation patient is currently lying comfortably in bed.  She is awake.  Denies any new issues or complaints.  Remains weak.  Stable for discharge however the patient is being evaluated for ARU at this time and awaiting acceptance to the facility.    Review of Systems   Constitutional: Positive for fatigue.   Respiratory: Negative for shortness of breath.    Cardiovascular: Negative for chest pain.   Neurological: Positive for weakness.        All pertinent negatives and positives are as above. All other systems have been reviewed and are negative unless otherwise stated.     Objective    As of today 06/01/23  Temp:  [96.8 °F (36 °C)-98 °F (36.7 °C)] 98 °F (36.7 °C)  Heart Rate:  [73-85] 83  Resp:  [18] 18  BP:  (100-128)/(51-73) 126/54    Physical Exam  Vitals reviewed.   Constitutional:       Appearance: She is well-developed.   HENT:      Head: Normocephalic and atraumatic.      Right Ear: External ear normal.      Left Ear: External ear normal.      Mouth/Throat:      Pharynx: Oropharynx is clear.   Eyes:      Extraocular Movements: Extraocular movements intact.      Conjunctiva/sclera: Conjunctivae normal.      Pupils: Pupils are equal, round, and reactive to light.   Cardiovascular:      Rate and Rhythm: Normal rate and regular rhythm.      Heart sounds: Normal heart sounds. No murmur heard.    No friction rub. No gallop.   Pulmonary:      Effort: Pulmonary effort is normal. No respiratory distress.      Breath sounds: Normal breath sounds. No wheezing or rales.   Chest:      Chest wall: No tenderness.   Abdominal:      General: Bowel sounds are normal. There is no distension.      Palpations: Abdomen is soft.      Tenderness: There is no abdominal tenderness. There is no guarding.   Musculoskeletal:      Cervical back: Normal range of motion and neck supple.      Comments: Left BKA   Skin:     General: Skin is warm and dry.      Comments: Right lower extremity erythema improving.  Blister on dorsum of right foot.  Ruptured blister on medial aspect of right distal lower extremity.   Neurological:      Motor: Weakness present.   Psychiatric:         Behavior: Behavior normal.         Thought Content: Thought content normal.           amLODIPine, 5 mg, Oral, Daily  ARIPiprazole, 5 mg, Oral, Daily  aspirin EC, 325 mg, Oral, Daily  atorvastatin, 80 mg, Oral, Daily  Bag Balm, 1 application, Topical, BID  clopidogrel, 75 mg, Oral, Daily  cyanocobalamin, 1,000 mcg, Intramuscular, Q28 Days  folic acid, 1,000 mcg, Oral, Daily  furosemide, 40 mg, Oral, Daily  gabapentin, 200 mg, Oral, Q12H  heparin (porcine), 5,000 Units, Subcutaneous, Q8H  hydroCHLOROthiazide, 12.5 mg, Oral, Daily  Insulin Aspart, 0-14 Units, Subcutaneous, TID  AC  insulin detemir, 30 Units, Subcutaneous, Daily  insulin detemir, 45 Units, Subcutaneous, Nightly  isosorbide mononitrate, 30 mg, Oral, Daily  levothyroxine, 50 mcg, Oral, QAM  pantoprazole, 40 mg, Oral, Daily  ranolazine, 500 mg, Oral, Q12H  senna-docusate sodium, 2 tablet, Oral, BID  sodium chloride, 10 mL, Intravenous, Q12H  sodium chloride, 10 mL, Intravenous, Q12H  sucralfate, 1 g, Oral, 4x Daily  tamsulosin, 0.4 mg, Oral, Daily  venlafaxine XR, 75 mg, Oral, Daily With Breakfast         Results Review:  I have reviewed the labs, radiology results, and diagnostic studies.    Laboratory Data:   Results from last 7 days   Lab Units 05/28/23  0654   SODIUM mmol/L 136   POTASSIUM mmol/L 4.0   CHLORIDE mmol/L 98   CO2 mmol/L 28.0   BUN mg/dL 12   CREATININE mg/dL 0.92   GLUCOSE mg/dL 295*   CALCIUM mg/dL 9.3   ANION GAP mmol/L 10.0     Estimated Creatinine Clearance: 88.7 mL/min (by C-G formula based on SCr of 0.92 mg/dL).          Results from last 7 days   Lab Units 06/01/23  0603 05/31/23  0538 05/30/23  0639 05/29/23  0525 05/28/23  0653   WBC 10*3/mm3 9.82 9.31 11.11* 8.86 11.87*   HEMOGLOBIN g/dL 12.3 11.8* 12.1 11.4* 11.6*   HEMATOCRIT % 38.0 34.7 37.4 35.0 35.7   PLATELETS 10*3/mm3 460* 416 520* 525* 488*           Culture Data:   No results found for: BLOODCX  No results found for: URINECX  No results found for: RESPCX  No results found for: WOUNDCX  No results found for: STOOLCX  No components found for: BODYFLD    Radiology Data:   Imaging Results (Last 24 Hours)     ** No results found for the last 24 hours. **          I have reviewed the patient's current medications.     Assessment/Plan     Principal Problem:    Sepsis without acute organ dysfunction, due to unspecified organism  Active Problems:    Urinary retention      1.  Right lower extremity cellulitis:   Improving nicely.    Completed antibiotic course.    Wound care following.  Continue current treatment.    2.  Urinary retention: Appreciate  urology help.  Urinary retention is resolved     3.  Hypertension:   Current blood pressure is 126/54.  O2 saturation 94% on room air.  Continue amlodipine, Lasix, hydrochlorothiazide    4.  Coronary artery disease:   Stable continue atorvastatin, Plavix, Imdur, Ranexa    5.  Diabetes mellitus:  Continue Levemir, sliding scale and ADA diet    Monitor.      6.  Deconditioning:   Continue PT/OT.  Patient is doing some better, but still feels that she needs rehab prior to home.     DVT prophylaxis: Heparin.  CODE STATUS full code    Awaiting acceptance to ARU.  Have discussed case with case management.  If ARU is not accept the patient it would be advisable to discharge the patient to skilled nursing facility.  Case management indicated that the patient is not agreeable to skilled nursing facility however if that is the only safe option should be explored.    Medical Decision Making  Number and Complexity of problems: Several highly complex medical problems     Conditions and Status:        Condition is improving.        Discussed with: Patient and      Treatment Plan  As above     Care Planning  Shared decision making: Patient in agreement with plan of care  Code status and discussions: Full code     Disposition  Social Determinants of Health that impact treatment or disposition: None  I expect the patient to be discharged to home in 1-2 days.         The patient was evaluated during the global COVID-19 pandemic, and the diagnosis was suspected/considered upon their initial presentation.  Evaluation, treatment, and testing were consistent with current guidelines for patients who present with complaints or symptoms that may be related to COVID-19.     I confirmed that the patient's Advance Care Plan is present, code status is documented, or surrogate decision maker is listed in the patient's medical record.      Reji Mendoza DO    Electronically signed by Reji Mendoza DO at 06/01/23 1216

## 2023-06-02 NOTE — PLAN OF CARE
Goal Outcome Evaluation:  Plan of Care Reviewed With: patient           Outcome Evaluation: VSS; C/o nausea; resting between care; family at bedside.

## 2023-06-02 NOTE — THERAPY TREATMENT NOTE
Patient Name: Ariana Martinez  : 1960    MRN: [id number]                              Today's Date: 2023       Admit Date: 2023    Visit Dx:     ICD-10-CM ICD-9-CM   1. Sepsis without acute organ dysfunction, due to unspecified organism  A41.9 038.9     995.91   2. Cellulitis of right lower extremity  L03.115 682.6   3. Type 2 diabetes mellitus with hyperglycemia, unspecified whether long term insulin use  E11.65 250.00   4. Impaired mobility and activities of daily living  Z74.09 V49.89    Z78.9    5. Impaired physical mobility  Z74.09 781.99   6. Impaired mobility and ADLs  Z74.09 V49.89    Z78.9    7. Urinary retention  R33.9 788.20     Patient Active Problem List   Diagnosis   • 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, with long-term current use of insulin   • Closed nondisplaced fracture of fifth left metatarsal bone   • MAURICIO (generalized anxiety disorder)   • Depression, major, recurrent, moderate (HCC)   • GERD without esophagitis   • Long term prescription opiate use   • Mixed hyperlipidemia   • Vitamin D deficiency   • Seasonal allergic rhinitis   • Restrictive lung disease secondary to obesity   • Adult body mass index 37.0-37.9   • Snoring   • Class 3 severe obesity due to excess calories without serious comorbidity with body mass index (BMI) of 40.0 to 44.9 in adult (HCC)   • (HFpEF) heart failure with preserved ejection fraction   • Type 2 diabetes mellitus with hyperglycemia, with long-term current use of insulin ([facility])   • Cyanocobalamin deficiency   • Coronary artery disease of native artery of native heart with stable angina pectoris   • Hypertension   • Meniere's disease   • Gastroparesis   • Pulmonary hypertension ([facility])   • Pes cavus   • Primary osteoarthritis involving multiple joints   • Generalized anxiety disorder   • Chronic right-sided low back pain with right-sided sciatica   • Chest pain   • Adverse effect of iron   • Chest pain due to myocardial  ischemia   • Nonrheumatic tricuspid valve regurgitation   • Iron deficiency anemia due to chronic blood loss   • Malaise and fatigue   • Ankle arthritis   • Urinary retention   • Endocarditis   • Essential hypertension   • Occlusion and stenosis of bilateral carotid arteries   • S/P BKA (below knee amputation) unilateral, left (HCC)   • Phantom pain after amputation of lower extremity   • S/P CABG (coronary artery bypass graft)   • Severe malnutrition   • TIA (transient ischemic attack)   • Coronary artery abnormality   • Elevated d-dimer   • Neuropathy   • Chest pain, unspecified type   • Acute pain of right shoulder   • Rotator cuff syndrome, right   • Acquired hypothyroidism   • Bilateral leg edema   • Left elbow pain   • Gastritis   • Olecranon bursitis, left elbow   • Sepsis without acute organ dysfunction, due to unspecified organism     Past Medical History:   Diagnosis Date   • Acute bacterial endocarditis 3/13/2021   • Angina, class IV    • Anxiety    • Anxiety and depression    • Arthritis    • Benign paroxysmal positional vertigo    • Bladder disorder     has bladder stimulator   • Carpal tunnel syndrome    • CHF (congestive heart failure)    • Chronic pain    • Coronary atherosclerosis     hx CABG 2005.  is followed by Dr Kwon   • Depression    • Diabetes mellitus     Type 2, controlled   • Diabetic polyneuropathy    • Disease of thyroid gland    • Elevated cholesterol    • Female stress incontinence    • Foot pain, left    • Full dentures    • Gastroparesis    • GERD (gastroesophageal reflux disease)    • Hyperlipidemia    • Hypertension    • Low back pain    • Malaise and fatigue    • Multiple joint pain    • Obesity     Refuses to be weighed   • Occlusion and stenosis of bilateral carotid arteries 6/18/2021   • Otalgia     Both   • Perforation of tympanic membrane     Left   • Postoperative wound infection    • Vitamin D deficiency    • Wears glasses     reading     Past Surgical History:    Procedure Laterality Date   • ABDOMINAL SURGERY     • AMPUTATION FOOT     • ANGIOPLASTY      coronary   • ANKLE ARTHROSCOPY Left 02/26/2021    Procedure: Left foot hardware removal, ankle arthroscopy, bone grafting , left foot exostectomy;  Surgeon: Ignacio Lord DPM;  Location: [facility] OR;  Service: Podiatry;  Laterality: Left;   • BREAST BIOPSY Right    • CARDIAC CATHETERIZATION     • CARDIAC CATHETERIZATION N/A 06/20/2017    Procedure: Right Heart Cath;  Surgeon: Can Kwon MD PhD;  Location: [facility] CATH INVASIVE LOCATION;  Service:    • CARDIAC CATHETERIZATION N/A 02/18/2020    Procedure: Left Heart Cath;  Surgeon: Catalina Cooper MD;  Location: [facility] CATH INVASIVE LOCATION;  Service: Cardiology;  Laterality: N/A;   • CARDIAC CATHETERIZATION N/A 04/06/2022    Procedure: Left Heart Cath;  Surgeon: Sheryl Navas MD;  Location: [facility] CATH INVASIVE LOCATION;  Service: Cardiology;  Laterality: N/A;   • CARPAL TUNNEL RELEASE     • CHOLECYSTECTOMY     • COLONOSCOPY N/A 06/24/2020    Procedure: COLONOSCOPY;  Surgeon: Julián Maldonado MD;  Location: [facility] ENDOSCOPY;  Service: Gastroenterology;  Laterality: N/A;   • CORONARY ARTERY BYPASS GRAFT  2005   • ENDOSCOPY N/A 10/19/2018    Procedure: ESOPHAGOGASTRODUODENOSCOPY possible dilation;  Surgeon: Julián Maldonado MD;  Location: [facility] ENDOSCOPY;  Service: Gastroenterology   • ENDOSCOPY N/A 06/24/2020    Procedure: ESOPHAGOGASTRODUODENOSCOPY WED appt please;  Surgeon: Julián Maldonado MD;  Location: [facility] ENDOSCOPY;  Service: Gastroenterology;  Laterality: N/A;   • ENDOSCOPY N/A 06/10/2022    Procedure: ESOPHAGOGASTRODUODENOSCOPY   room 380;  Surgeon: Jeremiah Wilkins MD;  Location: [facility] ENDOSCOPY;  Service: Gastroenterology;  Laterality: N/A;   • ENDOSCOPY N/A 1/10/2023    Procedure: ESOPHAGOGASTRODUODENOSCOPY;  Surgeon: Jeremiah Wilkins MD;  Location: [facility] ENDOSCOPY;  Service: Gastroenterology;  Laterality: N/A;   • ENDOSCOPY AND  COLONOSCOPY     • FOOT SURGERY      Toes   • FOOT SURGERY     • GASTRIC BANDING      Revision, laparoscopic   • HYSTERECTOMY     • INCISION AND DRAINAGE LEG Left 03/12/2021    Procedure: Left ankle arthroscopic irrigation and debridement, screw removal;  Surgeon: Ignacio Lord DPM;  Location: [facility];  Service: Podiatry;  Laterality: Left;   • MOUTH SURGERY     • SALPINGO OOPHORECTOMY     • SHOULDER SURGERY     • SUBTALAR ARTHRODESIS Left 01/16/2019    Procedure: LEFT FOOT HARDWARE REMOVAL, FIFTH METATARSAL , OPEN REDUCTION INTERNAL FIXATION, CALCANEAL OSTEOTOMY;  Surgeon: Ignacio Lord DPM;  Location: [facility];  Service: Podiatry   • SUBTALAR ARTHRODESIS Left 10/16/2019    Procedure: foot hardware removal, subtalar joint fusion  possible de/reattachment of achilles tendon        (c-arm);  Surgeon: Ignacio Lord DPM;  Location: [facility];  Service: Podiatry   • SUBTALAR ARTHRODESIS Left 09/30/2020    Procedure: subtalar, talonavicular joint arthrodesis.  Removal hardware.          (c-arm);  Surgeon: Ignacio Lord DPM;  Location: [facility];  Service: Podiatry;  Laterality: Left;   • TRANSESOPHAGEAL ECHOCARDIOGRAM (LAMONTE)      With color flow      General Information     Row Name 06/02/23 0742          OT Time and Intention    Document Type therapy note (daily note)  -     Mode of Treatment occupational therapy;individual therapy  -     Row Name 06/02/23 0742          General Information    Patient Profile Reviewed yes  -     Existing Precautions/Restrictions fall  -     Row Name 06/02/23 0742          Cognition    Orientation Status (Cognition) oriented x 4  -     Row Name 06/02/23 0742          Safety Issues, Functional Mobility    Impairments Affecting Function (Mobility) endurance/activity tolerance;pain;strength;balance  -           User Key  (r) = Recorded By, (t) = Taken By, (c) = Cosigned By    Initials Name Provider Type    RC Corina Morillo COTA Occupational Therapist  Assistant                 Mobility/ADL's    No documentation.                Obj/Interventions    No documentation.                Goals/Plan     Row Name 06/02/23 0742          Transfer Goal 1 (OT)    Activity/Assistive Device (Transfer Goal 1, OT) toilet;wheelchair transfer  -RC     [place] Level/Cues Needed (Transfer Goal 1, OT) modified independence  -RC     Time Frame (Transfer Goal 1, OT) long term goal (LTG)  -RC     Progress/Outcome (Transfer Goal 1, OT) goal not met  -RC     Row Name 06/02/23 0742          Bathing Goal 1 (OT)    Activity/Device (Bathing Goal 1, OT) bathing skills, all  -RC     [place] Level/Cues Needed (Bathing Goal 1, OT) modified independence  -RC     Time Frame (Bathing Goal 1, OT) long term goal (LTG)  -RC     Progress/Outcomes (Bathing Goal 1, OT) goal not met  -RC     Row Name 06/02/23 0742          Dressing Goal 1 (OT)    Activity/Device (Dressing Goal 1, OT) lower body dressing  -RC     [place]/Cues Needed (Dressing Goal 1, OT) modified independence  -RC     Time Frame (Dressing Goal 1, OT) long term goal (LTG)  -RC     Progress/Outcome (Dressing Goal 1, OT) goal not met  -RC     Row Name 06/02/23 0742          Toileting Goal 1 (OT)    Activity/Device (Toileting Goal 1, OT) toileting skills, all  -RC     [place] Level/Cues Needed (Toileting Goal 1, OT) modified independence  -RC     Time Frame (Toileting Goal 1, OT) long term goal (LTG)  -RC     Progress/Outcome (Toileting Goal 1, OT) goal not met  -RC     Row Name 06/02/23 0742          Self-Feeding Goal 1 (OT)    Activity/Device (Self-Feeding Goal 1, OT) self-feeding skills, all  -RC     [place] Level/Cues Needed (Self-Feeding Goal 1, OT) modified independence  -RC     Time Frame (Self-Feeding Goal 1, OT) long term goal (LTG)  -RC     Progress/Outcomes (Self-Feeding Goal 1, OT) goal met  -RC           User Key  (r) = Recorded By, (t) = Taken By, (c) = Cosigned By    Initials Name Provider Type    RC  Corina Morillo COTA Occupational Therapist Assistant               Clinical Impression     Row Name 06/02/23 0742          Pain Assessment    Pretreatment Pain Rating 8/10  -     Posttreatment Pain Rating 8/10  -     Pain Location - abdomen;back  -RC     Pain Intervention(s) Medication (See MAR)  -     Row Name 06/02/23 0742          Therapy Assessment/Plan (OT)    Rehab Potential (OT) good, to achieve stated therapy goals  -     Criteria for Skilled Therapeutic Interventions Met (OT) yes;skilled treatment is necessary  -     Therapy Frequency (OT) other (see comments)  5-7 d/wk  -     Row Name 06/02/23 07          Therapy Plan Review/Discharge Plan (OT)    Anticipated Discharge Disposition (OT) other (see comments)  continued rehab services  -           User Key  (r) = Recorded By, (t) = Taken By, (c) = Cosigned By    Initials Name Provider Type    Corina Scott COTA Occupational Therapist Assistant               Outcome Measures     Row Name 06/02/23 0742          How much help from another is currently needed...    Putting on and taking off regular lower body clothing? 3  -RC     Bathing (including washing, rinsing, and drying) 3  -RC     Toileting (which includes using toilet bed pan or urinal) 3  -RC     Putting on and taking off regular upper body clothing 4  -RC     Taking care of personal grooming (such as brushing teeth) 4  -RC     Eating meals 4  -RC     AM-PAC 6 Clicks Score (OT) 21  -     Row Name 06/02/23 0742          Functional Assessment    Outcome Measure Options AM-PAC 6 Clicks Daily Activity (OT)  -           User Key  (r) = Recorded By, (t) = Taken By, (c) = Cosigned By    Initials Name Provider Type    Corina Scott COTA Occupational Therapist Assistant                Occupational Therapy Education     Title: PT OT SLP Therapies (Done)     Topic: Occupational Therapy (Done)     Point: ADL training (Done)     Description:   Instruct learner(s) on proper safety  adaptation and remediation techniques during self care or transfers.   Instruct in proper use of assistive devices.              Learning Progress Summary           Patient Acceptance, E, VU by AB at 6/1/2023 0535    Acceptance, E, VU by AB at 5/31/2023 0519    Acceptance, E,TB, VU by MC at 5/30/2023 1133    Comment: OT role, POC, t/f training    Acceptance, E, VU by AB at 5/30/2023 0516    Acceptance, E,TB, VU by LW at 5/26/2023 1516    Acceptance, E,TB, VU by LW at 5/25/2023 1459    Acceptance, E,TB, VU by RW at 5/22/2023 1243    Comment: POC, Role of OT    Acceptance, E,TB, VU by BB at 5/20/2023 1358    Acceptance, E,TB, VU by BB at 5/20/2023 1357                   Point: Home exercise program (Done)     Description:   Instruct learner(s) on appropriate technique for monitoring, assisting and/or progressing therapeutic exercises/activities.              Learning Progress Summary           Patient Acceptance, E, VU by AB at 6/1/2023 0535    Acceptance, E, VU by AB at 5/31/2023 0519    Acceptance, E, VU by AB at 5/30/2023 0516    Acceptance, E,TB, VU by LW at 5/26/2023 1516    Acceptance, E,TB, VU by LW at 5/25/2023 1459    Acceptance, E,TB, VU by BB at 5/20/2023 1356                   Point: Precautions (Done)     Description:   Instruct learner(s) on prescribed precautions during self-care and functional transfers.              Learning Progress Summary           Patient Acceptance, E, VU by AB at 6/1/2023 0535    Acceptance, E, VU by AB at 5/31/2023 0519    Acceptance, E,TB, VU by MC at 5/30/2023 1133    Comment: OT role, POC, t/f training    Acceptance, E, VU by AB at 5/30/2023 0516    Acceptance, E,TB, VU by LW at 5/26/2023 1516    Acceptance, E,TB, VU by LW at 5/25/2023 1459    Acceptance, E,TB, VU by RW at 5/22/2023 1243    Comment: POC, Role of OT    Acceptance, E, DU by RB at 5/18/2023 9351    Comment: Pt edu on use of gait belt and non skid socks when OOB and no OOB without assist.                    "Point: Body mechanics (Done)     Description:   Instruct learner(s) on proper positioning and spine alignment during self-care, functional mobility activities and/or exercises.              Learning Progress Summary           Patient Acceptance, E, VU by AB at 6/1/2023 0535    Acceptance, E, VU by AB at 5/31/2023 0519    Acceptance, E,TB, VU by MC at 5/30/2023 1133    Comment: OT role, POC, t/f training    Acceptance, E, VU by AB at 5/30/2023 0516    Acceptance, E,TB, VU by LW at 5/26/2023 1516    Acceptance, E,TB, VU by LW at 5/25/2023 1459    Acceptance, E,TB, VU by RW at 5/22/2023 1243    Comment: POC, Role of OT    Acceptance, E,TB, VU by BB at 5/20/2023 1358    Acceptance, E,TB, VU by BB at 5/20/2023 1357                               User Key     Initials Effective Dates Name Provider Type Discipline    RB 06/16/21 - 05/30/23 Fredi France, OT Occupational Therapist OT    BB 06/16/21 -  Matilde Rios COTA Occupational Therapist Assistant OT    LW 06/16/21 -  Sarah Irby COTA Occupational Therapist Assistant OT     10/19/22 -  Dana Burger, OT Occupational Therapist OT    RW 09/22/22 -  Missy Esteves, OT Occupational Therapist OT    AB 11/07/22 -  Joana Mc LPN Licensed Nurse Nurse              OT Recommendation and Plan  Therapy Frequency (OT): other (see comments) (5-7 d/wk)  Plan of Care Review  Plan of Care Reviewed With: patient  Outcome Evaluation: nurising ok'd ot pt agreeable only to grooming act c/o \"stomach \"pain, after groming pt request to end tx as she is not feeling well.  cont poc     Time Calculation:    Time Calculation- OT     Row Name 06/02/23 1111             Time Calculation- OT    OT Start Time 0742  -RC      OT Stop Time 0800  -RC      OT Time Calculation (min) 18 min  -RC      Total Timed Code Minutes- OT 18 minute(s)  -RC      OT Received On 06/02/23  -RC         Timed Charges    60643 - OT Self Care/Mgmt Minutes 18  -RC         Total Minutes    Timed " Charges Total Minutes 18  -RC       Total Minutes 18  -RC            User Key  (r) = Recorded By, (t) = Taken By, (c) = Cosigned By    Initials Name Provider Type    Corina Scott COTA Occupational Therapist Assistant              Therapy Charges for Today     Code Description Service Date Service Provider Modifiers Qty    42304498189 HC OT SELF CARE/MGMT/TRAIN EA 15 MIN 6/2/2023 Corina Morillo COTA GO 1               LINDA Whyte  6/2/2023

## 2023-06-02 NOTE — PLAN OF CARE
Goal Outcome Evaluation:  Plan of Care Reviewed With: patient        Progress: no change  Outcome Evaluation: VSS. No acute changes this shift. Patient medicated for pain and nausea x 1 this shift. No emesis noted. Sleeping most of this shift.

## 2023-06-02 NOTE — SIGNIFICANT NOTE
06/02/23 0856   OTHER   Discipline physical therapy assistant   Rehab Time/Intention   Session Not Performed patient/family declined, not feeling well     C/o sick at stomach but wants me to check back later.

## 2023-06-02 NOTE — PLAN OF CARE
Goal Outcome Evaluation:  Plan of Care Reviewed With: patient           Outcome Evaluation: pt defers gt and ther ex 2' low stomach pain, pt on b/s commode and wanting to return to bed, t/fers sit<>stand w/ CGA, st. pivot b/s commode to bed, pt t/fers sit to sup, rolling L and R w/ Mod Ind, scoot up in bed w/ SBA for vc's

## 2023-06-03 LAB
BASOPHILS # BLD AUTO: 0.06 10*3/MM3 (ref 0–0.2)
BASOPHILS NFR BLD AUTO: 0.7 % (ref 0–1.5)
DEPRECATED RDW RBC AUTO: 44.2 FL (ref 37–54)
EOSINOPHIL # BLD AUTO: 0.18 10*3/MM3 (ref 0–0.4)
EOSINOPHIL NFR BLD AUTO: 2 % (ref 0.3–6.2)
ERYTHROCYTE [DISTWIDTH] IN BLOOD BY AUTOMATED COUNT: 13.8 % (ref 12.3–15.4)
GLUCOSE BLDC GLUCOMTR-MCNC: 290 MG/DL (ref 70–130)
GLUCOSE BLDC GLUCOMTR-MCNC: 336 MG/DL (ref 70–130)
GLUCOSE BLDC GLUCOMTR-MCNC: 382 MG/DL (ref 70–130)
HCT VFR BLD AUTO: 36.8 % (ref 34–46.6)
HGB BLD-MCNC: 11.8 G/DL (ref 12–15.9)
HOLD SPECIMEN: NORMAL
IMM GRANULOCYTES # BLD AUTO: 0.03 10*3/MM3 (ref 0–0.05)
IMM GRANULOCYTES NFR BLD AUTO: 0.3 % (ref 0–0.5)
LYMPHOCYTES # BLD AUTO: 2.03 10*3/MM3 (ref 0.7–3.1)
LYMPHOCYTES NFR BLD AUTO: 22.2 % (ref 19.6–45.3)
MCH RBC QN AUTO: 28.2 PG (ref 26.6–33)
MCHC RBC AUTO-ENTMCNC: 32.1 G/DL (ref 31.5–35.7)
MCV RBC AUTO: 87.8 FL (ref 79–97)
MONOCYTES # BLD AUTO: 0.69 10*3/MM3 (ref 0.1–0.9)
MONOCYTES NFR BLD AUTO: 7.6 % (ref 5–12)
NEUTROPHILS NFR BLD AUTO: 6.14 10*3/MM3 (ref 1.7–7)
NEUTROPHILS NFR BLD AUTO: 67.2 % (ref 42.7–76)
NRBC BLD AUTO-RTO: 0 /100 WBC (ref 0–0.2)
PLATELET # BLD AUTO: 423 10*3/MM3 (ref 140–450)
PMV BLD AUTO: 9.2 FL (ref 6–12)
RBC # BLD AUTO: 4.19 10*6/MM3 (ref 3.77–5.28)
WBC NRBC COR # BLD: 9.13 10*3/MM3 (ref 3.4–10.8)

## 2023-06-03 PROCEDURE — 82948 REAGENT STRIP/BLOOD GLUCOSE: CPT

## 2023-06-03 PROCEDURE — 63710000001 ONDANSETRON PER 8 MG: Performed by: UROLOGY

## 2023-06-03 PROCEDURE — 63710000001 INSULIN DETEMIR PER 5 UNITS: Performed by: HOSPITALIST

## 2023-06-03 PROCEDURE — 63710000001 INSULIN ASPART PER 5 UNITS: Performed by: UROLOGY

## 2023-06-03 PROCEDURE — 85025 COMPLETE CBC W/AUTO DIFF WBC: CPT | Performed by: HOSPITALIST

## 2023-06-03 PROCEDURE — 25010000002 HEPARIN (PORCINE) PER 1000 UNITS: Performed by: UROLOGY

## 2023-06-03 PROCEDURE — 63710000001 INSULIN DETEMIR PER 5 UNITS: Performed by: UROLOGY

## 2023-06-03 PROCEDURE — 97110 THERAPEUTIC EXERCISES: CPT

## 2023-06-03 RX ADMIN — VENLAFAXINE HYDROCHLORIDE 75 MG: 75 CAPSULE, EXTENDED RELEASE ORAL at 07:37

## 2023-06-03 RX ADMIN — GABAPENTIN 200 MG: 100 CAPSULE ORAL at 07:35

## 2023-06-03 RX ADMIN — HYDROCHLOROTHIAZIDE 12.5 MG: 12.5 TABLET ORAL at 07:36

## 2023-06-03 RX ADMIN — HEPARIN SODIUM 5000 UNITS: 5000 INJECTION INTRAVENOUS; SUBCUTANEOUS at 21:04

## 2023-06-03 RX ADMIN — INSULIN DETEMIR 45 UNITS: 100 INJECTION, SOLUTION SUBCUTANEOUS at 21:05

## 2023-06-03 RX ADMIN — DOCUSATE SODIUM 50 MG AND SENNOSIDES 8.6 MG 2 TABLET: 8.6; 5 TABLET, FILM COATED ORAL at 07:34

## 2023-06-03 RX ADMIN — INSULIN DETEMIR 30 UNITS: 100 INJECTION, SOLUTION SUBCUTANEOUS at 07:33

## 2023-06-03 RX ADMIN — ONDANSETRON HYDROCHLORIDE 4 MG: 4 TABLET, FILM COATED ORAL at 16:33

## 2023-06-03 RX ADMIN — METOCLOPRAMIDE 10 MG: 10 TABLET ORAL at 06:01

## 2023-06-03 RX ADMIN — Medication 1 APPLICATION: at 21:05

## 2023-06-03 RX ADMIN — DOCUSATE SODIUM 50 MG AND SENNOSIDES 8.6 MG 2 TABLET: 8.6; 5 TABLET, FILM COATED ORAL at 21:04

## 2023-06-03 RX ADMIN — Medication 1 APPLICATION: at 07:37

## 2023-06-03 RX ADMIN — FOLIC ACID 1000 MCG: 1 TABLET ORAL at 07:35

## 2023-06-03 RX ADMIN — ISOSORBIDE MONONITRATE 30 MG: 30 TABLET, EXTENDED RELEASE ORAL at 07:36

## 2023-06-03 RX ADMIN — AMLODIPINE BESYLATE 5 MG: 5 TABLET ORAL at 07:35

## 2023-06-03 RX ADMIN — SUCRALFATE 1 G: 1 TABLET ORAL at 16:33

## 2023-06-03 RX ADMIN — CLOPIDOGREL BISULFATE 75 MG: 75 TABLET ORAL at 07:37

## 2023-06-03 RX ADMIN — INSULIN ASPART 12 UNITS: 100 INJECTION, SOLUTION INTRAVENOUS; SUBCUTANEOUS at 11:23

## 2023-06-03 RX ADMIN — GABAPENTIN 200 MG: 100 CAPSULE ORAL at 21:04

## 2023-06-03 RX ADMIN — HEPARIN SODIUM 5000 UNITS: 5000 INJECTION INTRAVENOUS; SUBCUTANEOUS at 13:26

## 2023-06-03 RX ADMIN — Medication 10 ML: at 21:05

## 2023-06-03 RX ADMIN — RANOLAZINE 500 MG: 500 TABLET, FILM COATED, EXTENDED RELEASE ORAL at 07:36

## 2023-06-03 RX ADMIN — SUCRALFATE 1 G: 1 TABLET ORAL at 11:23

## 2023-06-03 RX ADMIN — HEPARIN SODIUM 5000 UNITS: 5000 INJECTION INTRAVENOUS; SUBCUTANEOUS at 06:01

## 2023-06-03 RX ADMIN — LEVOTHYROXINE SODIUM 50 MCG: 50 TABLET ORAL at 06:01

## 2023-06-03 RX ADMIN — INSULIN ASPART 10 UNITS: 100 INJECTION, SOLUTION INTRAVENOUS; SUBCUTANEOUS at 07:32

## 2023-06-03 RX ADMIN — RANOLAZINE 500 MG: 500 TABLET, FILM COATED, EXTENDED RELEASE ORAL at 21:04

## 2023-06-03 RX ADMIN — HYDROCODONE BITARTRATE AND ACETAMINOPHEN 1 TABLET: 10; 325 TABLET ORAL at 16:33

## 2023-06-03 RX ADMIN — FUROSEMIDE 40 MG: 40 TABLET ORAL at 07:37

## 2023-06-03 RX ADMIN — ARIPIPRAZOLE 5 MG: 5 TABLET ORAL at 07:34

## 2023-06-03 RX ADMIN — SUCRALFATE 1 G: 1 TABLET ORAL at 07:37

## 2023-06-03 RX ADMIN — ASPIRIN 325 MG: 325 TABLET, COATED ORAL at 07:36

## 2023-06-03 RX ADMIN — ONDANSETRON HYDROCHLORIDE 4 MG: 4 TABLET, FILM COATED ORAL at 02:30

## 2023-06-03 RX ADMIN — PANTOPRAZOLE SODIUM 40 MG: 40 TABLET, DELAYED RELEASE ORAL at 07:35

## 2023-06-03 RX ADMIN — SUCRALFATE 1 G: 1 TABLET ORAL at 21:04

## 2023-06-03 RX ADMIN — HYDROCODONE BITARTRATE AND ACETAMINOPHEN 1 TABLET: 10; 325 TABLET ORAL at 06:01

## 2023-06-03 RX ADMIN — TAMSULOSIN HYDROCHLORIDE 0.4 MG: 0.4 CAPSULE ORAL at 07:37

## 2023-06-03 RX ADMIN — INSULIN ASPART 8 UNITS: 100 INJECTION, SOLUTION INTRAVENOUS; SUBCUTANEOUS at 16:33

## 2023-06-03 RX ADMIN — ATORVASTATIN CALCIUM 80 MG: 40 TABLET, FILM COATED ORAL at 07:35

## 2023-06-03 NOTE — SIGNIFICANT NOTE
06/03/23 1435   OTHER   Discipline physical therapy assistant   Rehab Time/Intention   Session Not Performed patient unavailable for treatment  (Pt with [facility]. WIll check back tomorrow.)

## 2023-06-03 NOTE — THERAPY TREATMENT NOTE
Patient Name: Ariana Martinez  : 1960    MRN: [id number]                              Today's Date: 6/3/2023       Admit Date: 2023    Visit Dx:     ICD-10-CM ICD-9-CM   1. Sepsis without acute organ dysfunction, due to unspecified organism  A41.9 038.9     995.91   2. Cellulitis of right lower extremity  L03.115 682.6   3. Type 2 diabetes mellitus with hyperglycemia, unspecified whether long term insulin use  E11.65 250.00   4. Impaired mobility and activities of daily living  Z74.09 V49.89    Z78.9    5. Impaired physical mobility  Z74.09 781.99   6. Impaired mobility and ADLs  Z74.09 V49.89    Z78.9    7. Urinary retention  R33.9 788.20     Patient Active Problem List   Diagnosis    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, with long-term current use of insulin    Closed nondisplaced fracture of fifth left metatarsal bone    MAURICIO (generalized anxiety disorder)    Depression, major, recurrent, moderate (HCC)    GERD without esophagitis    Long term prescription opiate use    Mixed hyperlipidemia    Vitamin D deficiency    Seasonal allergic rhinitis    Restrictive lung disease secondary to obesity    Adult body mass index 37.0-37.9    Snoring    Class 3 severe obesity due to excess calories without serious comorbidity with body mass index (BMI) of 40.0 to 44.9 in adult (HCC)    (HFpEF) heart failure with preserved ejection fraction    Type 2 diabetes mellitus with hyperglycemia, with long-term current use of insulin (HCC)    Cyanocobalamin deficiency    Coronary artery disease of native artery of native heart with stable angina pectoris    Hypertension    Meniere's disease    Gastroparesis    Pulmonary hypertension (HCC)    Pes cavus    Primary osteoarthritis involving multiple joints    Generalized anxiety disorder    Chronic right-sided low back pain with right-sided sciatica    Chest pain    Adverse effect of iron    Chest pain due to myocardial ischemia    Nonrheumatic  tricuspid valve regurgitation    Iron deficiency anemia due to chronic blood loss    Malaise and fatigue    Ankle arthritis    Urinary retention    Endocarditis    Essential hypertension    Occlusion and stenosis of bilateral carotid arteries    S/P BKA (below knee amputation) unilateral, left (HCC)    Phantom pain after amputation of lower extremity    S/P CABG (coronary artery bypass graft)    Severe malnutrition    TIA (transient ischemic attack)    Coronary artery abnormality    Elevated d-dimer    Neuropathy    Chest pain, unspecified type    Acute pain of right shoulder    Rotator cuff syndrome, right    Acquired hypothyroidism    Bilateral leg edema    Left elbow pain    Gastritis    Olecranon bursitis, left elbow    Sepsis without acute organ dysfunction, due to unspecified organism     Past Medical History:   Diagnosis Date    Acute bacterial endocarditis 3/13/2021    Angina, class IV     Anxiety     Anxiety and depression     Arthritis     Benign paroxysmal positional vertigo     Bladder disorder     has bladder stimulator    Carpal tunnel syndrome     CHF (congestive heart failure)     Chronic pain     Coronary atherosclerosis     hx CABG 2005.  is followed by Dr Cher Amaya     Diabetes mellitus     Type 2, controlled    Diabetic polyneuropathy     Disease of thyroid gland     Elevated cholesterol     Female stress incontinence     Foot pain, left     Full dentures     Gastroparesis     GERD (gastroesophageal reflux disease)     Hyperlipidemia     Hypertension     Low back pain     Malaise and fatigue     Multiple joint pain     Obesity     Refuses to be weighed    Occlusion and stenosis of bilateral carotid arteries 6/18/2021    Otalgia     Both    Perforation of tympanic membrane     Left    Postoperative wound infection     Vitamin D deficiency     Wears glasses     reading     Past Surgical History:   Procedure Laterality Date    ABDOMINAL SURGERY      AMPUTATION FOOT      ANGIOPLASTY       coronary    ANKLE ARTHROSCOPY Left 02/26/2021    Procedure: Left foot hardware removal, ankle arthroscopy, bone grafting , left foot exostectomy;  Surgeon: Ignacio Lord DPM;  Location: [facility] OR;  Service: Podiatry;  Laterality: Left;    BREAST BIOPSY Right     CARDIAC CATHETERIZATION      CARDIAC CATHETERIZATION N/A 06/20/2017    Procedure: Right Heart Cath;  Surgeon: Can Kwon MD PhD;  Location: [facility] CATH INVASIVE LOCATION;  Service:     CARDIAC CATHETERIZATION N/A 02/18/2020    Procedure: Left Heart Cath;  Surgeon: Catalina Cooper MD;  Location: [facility] CATH INVASIVE LOCATION;  Service: Cardiology;  Laterality: N/A;    CARDIAC CATHETERIZATION N/A 04/06/2022    Procedure: Left Heart Cath;  Surgeon: Sheryl Navas MD;  Location: [facility] CATH INVASIVE LOCATION;  Service: Cardiology;  Laterality: N/A;    CARPAL TUNNEL RELEASE      CHOLECYSTECTOMY      COLONOSCOPY N/A 06/24/2020    Procedure: COLONOSCOPY;  Surgeon: Julián Maldonado MD;  Location: [facility] ENDOSCOPY;  Service: Gastroenterology;  Laterality: N/A;    CORONARY ARTERY BYPASS GRAFT  2005    ENDOSCOPY N/A 10/19/2018    Procedure: ESOPHAGOGASTRODUODENOSCOPY possible dilation;  Surgeon: Julián Maldonado MD;  Location: [facility] ENDOSCOPY;  Service: Gastroenterology    ENDOSCOPY N/A 06/24/2020    Procedure: ESOPHAGOGASTRODUODENOSCOPY WED appt please;  Surgeon: Julián Maldonado MD;  Location: [facility] ENDOSCOPY;  Service: Gastroenterology;  Laterality: N/A;    ENDOSCOPY N/A 06/10/2022    Procedure: ESOPHAGOGASTRODUODENOSCOPY   room 380;  Surgeon: Jeremiah Wilkins MD;  Location: [facility] ENDOSCOPY;  Service: Gastroenterology;  Laterality: N/A;    ENDOSCOPY N/A 1/10/2023    Procedure: ESOPHAGOGASTRODUODENOSCOPY;  Surgeon: Jeremiah Wilkins MD;  Location: [facility] ENDOSCOPY;  Service: Gastroenterology;  Laterality: N/A;    ENDOSCOPY AND COLONOSCOPY      FOOT SURGERY      Toes    FOOT SURGERY      GASTRIC BANDING      Revision, laparoscopic     HYSTERECTOMY      INCISION AND DRAINAGE LEG Left 03/12/2021    Procedure: Left ankle arthroscopic irrigation and debridement, screw removal;  Surgeon: Ignacio Lord DPM;  Location: [facility];  Service: Podiatry;  Laterality: Left;    MOUTH SURGERY      SALPINGO OOPHORECTOMY      SHOULDER SURGERY      SUBTALAR ARTHRODESIS Left 01/16/2019    Procedure: LEFT FOOT HARDWARE REMOVAL, FIFTH METATARSAL , OPEN REDUCTION INTERNAL FIXATION, CALCANEAL OSTEOTOMY;  Surgeon: Ignacio Lord DPM;  Location: [facility] OR;  Service: Podiatry    SUBTALAR ARTHRODESIS Left 10/16/2019    Procedure: foot hardware removal, subtalar joint fusion  possible de/reattachment of achilles tendon        (c-arm);  Surgeon: Ignacio Lord DPM;  Location: [facility] OR;  Service: Podiatry    SUBTALAR ARTHRODESIS Left 09/30/2020    Procedure: subtalar, talonavicular joint arthrodesis.  Removal hardware.          (c-arm);  Surgeon: Ignacio Lord DPM;  Location: [facility];  Service: Podiatry;  Laterality: Left;    TRANSESOPHAGEAL ECHOCARDIOGRAM (LAMONTE)      With color flow      General Information       Row Name 06/03/23 1420          OT Time and Intention    Document Type therapy note (daily note)  -CS     Mode of Treatment occupational therapy  -CS       Row Name 06/03/23 1420          General Information    Patient Profile Reviewed yes  -CS     Existing Precautions/Restrictions fall  -CS       Row Name 06/03/23 1420          Cognition    Orientation Status (Cognition) oriented to;person  -CS       Row Name 06/03/23 1420          Safety Issues, Functional Mobility    Impairments Affecting Function (Mobility) endurance/activity tolerance;pain;strength;balance  -CS               User Key  (r) = Recorded By, (t) = Taken By, (c) = Cosigned By      Initials Name Provider Type    CS Bessie Medina COTA Occupational Therapist Assistant                     Mobility/ADL's    No documentation.                  Obj/Interventions       Row Name  06/03/23 1420          Shoulder (Therapeutic Exercise)    Shoulder (Therapeutic Exercise) AROM (active range of motion)  -     Shoulder AROM (Therapeutic Exercise) bilateral;flexion;extension;external rotation;internal rotation  -     Shoulder Strengthening (Therapeutic Exercise) bilateral;flexion;extension;external rotation;internal rotation  -       Row Name 06/03/23 1420          Elbow/Forearm (Therapeutic Exercise)    Elbow/Forearm (Therapeutic Exercise) AROM (active range of motion)  -     Elbow/Forearm AROM (Therapeutic Exercise) bilateral;flexion;extension;supination;pronation  -     Elbow/Forearm Strengthening (Therapeutic Exercise) bilateral;flexion;extension;supination;pronation  -       Row Name 06/03/23 1420          Wrist (Therapeutic Exercise)    Wrist (Therapeutic Exercise) AROM (active range of motion)  -     Wrist AROM (Therapeutic Exercise) bilateral;flexion;extension  -     Wrist Strengthening (Therapeutic Exercise) bilateral;flexion;extension  -       Row Name 06/03/23 1420          Hand (Therapeutic Exercise)    Hand (Therapeutic Exercise) AROM (active range of motion)  -     Hand AROM/AAROM (Therapeutic Exercise) bilateral;finger flexion;finger extension  -       Row Name 06/03/23 1420          Motor Skills    Therapeutic Exercise shoulder;elbow/forearm;wrist;hand  -               User Key  (r) = Recorded By, (t) = Taken By, (c) = Cosigned By      Initials Name Provider Type    CS Bessie Medina COTA Occupational Therapist Assistant                   Goals/Plan       Row Name 06/03/23 1420          Transfer Goal 1 (OT)    Activity/Assistive Device (Transfer Goal 1, OT) toilet;wheelchair transfer  -     [place] Level/Cues Needed (Transfer Goal 1, OT) modified independence  -     Time Frame (Transfer Goal 1, OT) long term goal (LTG)  -     Progress/Outcome (Transfer Goal 1, OT) goal not met  -       Row Name 06/03/23 1420          Bathing Goal 1 (OT)     Activity/Device (Bathing Goal 1, OT) bathing skills, all  -CS     [place] Level/Cues Needed (Bathing Goal 1, OT) modified independence  -CS     Time Frame (Bathing Goal 1, OT) long term goal (LTG)  -CS     Progress/Outcomes (Bathing Goal 1, OT) goal not met  -CS       Row Name 06/03/23 1420          Dressing Goal 1 (OT)    Activity/Device (Dressing Goal 1, OT) lower body dressing  -CS     [place]/Cues Needed (Dressing Goal 1, OT) modified independence  -CS     Time Frame (Dressing Goal 1, OT) long term goal (LTG)  -CS     Progress/Outcome (Dressing Goal 1, OT) goal not met  -CS       Row Name 06/03/23 1420          Toileting Goal 1 (OT)    Activity/Device (Toileting Goal 1, OT) toileting skills, all  -CS     [place] Level/Cues Needed (Toileting Goal 1, OT) modified independence  -CS     Time Frame (Toileting Goal 1, OT) long term goal (LTG)  -CS     Progress/Outcome (Toileting Goal 1, OT) goal not met  -CS       Row Name 06/03/23 1420          Self-Feeding Goal 1 (OT)    Activity/Device (Self-Feeding Goal 1, OT) self-feeding skills, all  -CS     [place] Level/Cues Needed (Self-Feeding Goal 1, OT) modified independence  -CS     Time Frame (Self-Feeding Goal 1, OT) long term goal (LTG)  -CS     Progress/Outcomes (Self-Feeding Goal 1, OT) goal met  -CS               User Key  (r) = Recorded By, (t) = Taken By, (c) = Cosigned By      Initials Name Provider Type    CS Bessie Medina COTA Occupational Therapist Assistant                   Clinical Impression       Row Name 06/03/23 1420          Pain Assessment    Pretreatment Pain Rating 6/10  -CS     Posttreatment Pain Rating 6/10  -CS     Pain Location - abdomen  -CS     Pain Intervention(s) Rest;Repositioned  -CS       Row Name 06/03/23 1420          Plan of Care Review    Plan of Care Reviewed With patient  -CS     Progress improving  -CS     Outcome Evaluation Pt tolerated tx fair this date. Pt c/o stomach pain but agreed to BUE ther ex. Pt  gave good effort with UE ther ex. No goals met this tx. Continue OT POC.  -CS       Row Name 06/03/23 1420          Therapy Assessment/Plan (OT)    Rehab Potential (OT) good, to achieve stated therapy goals  -CS     Criteria for Skilled Therapeutic Interventions Met (OT) yes;skilled treatment is necessary  -CS       Row Name 06/03/23 1420          Vital Signs    Pre Patient Position Supine  -CS     Post Patient Position Supine  -CS       Row Name 06/03/23 1420          Positioning and Restraints    Pre-Treatment Position in bed  -CS     Post Treatment Position bed  -CS     In Bed fowlers;call light within reach;encouraged to call for assist;exit alarm on  -CS               User Key  (r) = Recorded By, (t) = Taken By, (c) = Cosigned By      Initials Name Provider Type    CS Bessei Medina COTA Occupational Therapist Assistant                   Outcome Measures       Row Name 06/03/23 1420          How much help from another is currently needed...    Putting on and taking off regular lower body clothing? 3  -CS     Bathing (including washing, rinsing, and drying) 3  -CS     Toileting (which includes using toilet bed pan or urinal) 3  -CS     Putting on and taking off regular upper body clothing 4  -CS     Taking care of personal grooming (such as brushing teeth) 4  -CS     Eating meals 4  -CS     AM-PAC 6 Clicks Score (OT) 21  -CS       Row Name 06/03/23 0849          How much help from another person do you currently need...    Turning from your back to your side while in flat bed without using bedrails? 2  -LR     Moving from lying on back to sitting on the side of a flat bed without bedrails? 2  -LR     Moving to and from a bed to a chair (including a wheelchair)? 2  -LR     Standing up from a chair using your arms (e.g., wheelchair, bedside chair)? 2  -LR     Climbing 3-5 steps with a railing? 1  -LR     To walk in hospital room? 2  -LR     AM-PAC 6 Clicks Score (PT) 11  -LR     Highest level of mobility 4 -->  Transferred to chair/commode  -LR               User Key  (r) = Recorded By, (t) = Taken By, (c) = Cosigned By      Initials Name Provider Type    Niyah Moreno RN Registered Nurse    Bessie Contreras COTA Occupational Therapist Assistant                    Occupational Therapy Education       Title: PT OT SLP Therapies (Done)       Topic: Occupational Therapy (Done)       Point: ADL training (Done)       Description:   Instruct learner(s) on proper safety adaptation and remediation techniques during self care or transfers.   Instruct in proper use of assistive devices.                  Learning Progress Summary             Patient Acceptance, E, VU by AB at 6/1/2023 0535    Acceptance, E, VU by AB at 5/31/2023 0519    Acceptance, E,TB, VU by MC at 5/30/2023 1133    Comment: OT role, POC, t/f training    Acceptance, E, VU by AB at 5/30/2023 0516    Acceptance, E,TB, VU by LW at 5/26/2023 1516    Acceptance, E,TB, VU by LW at 5/25/2023 1459    Acceptance, E,TB, VU by RW at 5/22/2023 1243    Comment: POC, Role of OT    Acceptance, E,TB, VU by BB at 5/20/2023 1358    Acceptance, E,TB, VU by BB at 5/20/2023 1357                         Point: Home exercise program (Done)       Description:   Instruct learner(s) on appropriate technique for monitoring, assisting and/or progressing therapeutic exercises/activities.                  Learning Progress Summary             Patient Acceptance, E, VU by AB at 6/1/2023 0535    Acceptance, E, VU by AB at 5/31/2023 0519    Acceptance, E, VU by AB at 5/30/2023 0516    Acceptance, E,TB, VU by LW at 5/26/2023 1516    Acceptance, E,TB, VU by LW at 5/25/2023 1459    Acceptance, E,TB, VU by BB at 5/20/2023 1356                         Point: Precautions (Done)       Description:   Instruct learner(s) on prescribed precautions during self-care and functional transfers.                  Learning Progress Summary             Patient Acceptance, E, VU by AB at 6/1/2023 0535     Acceptance, E, VU by AB at 5/31/2023 0519    Acceptance, E,TB, VU by MC at 5/30/2023 1133    Comment: OT role, POC, t/f training    Acceptance, E, VU by AB at 5/30/2023 0516    Acceptance, E,TB, VU by LW at 5/26/2023 1516    Acceptance, E,TB, VU by LW at 5/25/2023 1459    Acceptance, E,TB, VU by RW at 5/22/2023 1243    Comment: POC, Role of OT    Acceptance, E, DU by RB at 5/18/2023 1352    Comment: Pt edu on use of gait belt and non skid socks when OOB and no OOB without assist.                         Point: Body mechanics (Done)       Description:   Instruct learner(s) on proper positioning and spine alignment during self-care, functional mobility activities and/or exercises.                  Learning Progress Summary             Patient Acceptance, E, VU by AB at 6/1/2023 0535    Acceptance, E, VU by AB at 5/31/2023 0519    Acceptance, E,TB, VU by  at 5/30/2023 1133    Comment: OT role, POC, t/f training    Acceptance, E, VU by AB at 5/30/2023 0516    Acceptance, E,TB, VU by LW at 5/26/2023 1516    Acceptance, E,TB, VU by LW at 5/25/2023 1459    Acceptance, E,TB, VU by RW at 5/22/2023 1243    Comment: POC, Role of OT    Acceptance, E,TB, VU by BB at 5/20/2023 1358    Acceptance, E,TB, VU by  at 5/20/2023 1357                                         User Key       Initials Effective Dates Name Provider Type Discipline    RB 06/16/21 - 05/30/23 Fredi France, OT Occupational Therapist OT    BB 06/16/21 -  Matilde Rios COTA Occupational Therapist Assistant OT    LW 06/16/21 -  Sarah Irby COTA Occupational Therapist Assistant OT     10/19/22 -  Dana Burger, OT Occupational Therapist OT    RW 09/22/22 -  Missy Esteves OT Occupational Therapist OT    AB 11/07/22 -  Joana Mc LPN Licensed Nurse Nurse                  OT Recommendation and Plan     Plan of Care Review  Plan of Care Reviewed With: patient  Progress: improving  Outcome Evaluation: Pt tolerated tx fair this date. Pt  c/o stomach pain but agreed to BUE ther ex. Pt gave good effort with UE ther ex. No goals met this tx. Continue OT POC.     Time Calculation:    Time Calculation- OT       Row Name 06/03/23 1533             Time Calculation- OT    OT Start Time 1420  -CS      OT Stop Time 1444  -CS      OT Time Calculation (min) 24 min  -CS      Total Timed Code Minutes- OT 24 minute(s)  -CS      OT Received On 06/03/23  -CS         Timed Charges    26468 - OT Therapeutic Exercise Minutes 24  -CS         Total Minutes    Timed Charges Total Minutes 24  -CS       Total Minutes 24  -CS                User Key  (r) = Recorded By, (t) = Taken By, (c) = Cosigned By      Initials Name Provider Type    CS Bessie Medina COTA Occupational Therapist Assistant                  Therapy Charges for Today       Code Description Service Date Service Provider Modifiers Qty    [id number] HC OT THER PROC EA 15 MIN 6/3/2023 Bessie Medina COTA GO 2                 LINDA Davila  6/3/2023

## 2023-06-03 NOTE — PLAN OF CARE
Goal Outcome Evaluation:  Plan of Care Reviewed With: patient        Progress: improving  Outcome Evaluation: Pt tolerated tx fair this date. Pt c/o stomach pain but agreed to BUE ther ex. Pt gave good effort with UE ther ex. No goals met this tx. Continue OT POC.

## 2023-06-04 LAB
BASOPHILS # BLD AUTO: 0.07 10*3/MM3 (ref 0–0.2)
BASOPHILS NFR BLD AUTO: 0.7 % (ref 0–1.5)
DEPRECATED RDW RBC AUTO: 43 FL (ref 37–54)
EOSINOPHIL # BLD AUTO: 0.2 10*3/MM3 (ref 0–0.4)
EOSINOPHIL NFR BLD AUTO: 1.9 % (ref 0.3–6.2)
ERYTHROCYTE [DISTWIDTH] IN BLOOD BY AUTOMATED COUNT: 13.7 % (ref 12.3–15.4)
GLUCOSE BLDC GLUCOMTR-MCNC: 335 MG/DL (ref 70–130)
GLUCOSE BLDC GLUCOMTR-MCNC: 353 MG/DL (ref 70–130)
GLUCOSE BLDC GLUCOMTR-MCNC: 391 MG/DL (ref 70–130)
HCT VFR BLD AUTO: 36.1 % (ref 34–46.6)
HGB BLD-MCNC: 11.8 G/DL (ref 12–15.9)
HOLD SPECIMEN: NORMAL
IMM GRANULOCYTES # BLD AUTO: 0.04 10*3/MM3 (ref 0–0.05)
IMM GRANULOCYTES NFR BLD AUTO: 0.4 % (ref 0–0.5)
LYMPHOCYTES # BLD AUTO: 1.8 10*3/MM3 (ref 0.7–3.1)
LYMPHOCYTES NFR BLD AUTO: 17.3 % (ref 19.6–45.3)
MCH RBC QN AUTO: 28.4 PG (ref 26.6–33)
MCHC RBC AUTO-ENTMCNC: 32.7 G/DL (ref 31.5–35.7)
MCV RBC AUTO: 86.8 FL (ref 79–97)
MONOCYTES # BLD AUTO: 0.67 10*3/MM3 (ref 0.1–0.9)
MONOCYTES NFR BLD AUTO: 6.4 % (ref 5–12)
NEUTROPHILS NFR BLD AUTO: 7.61 10*3/MM3 (ref 1.7–7)
NEUTROPHILS NFR BLD AUTO: 73.3 % (ref 42.7–76)
NRBC BLD AUTO-RTO: 0 /100 WBC (ref 0–0.2)
PLATELET # BLD AUTO: 431 10*3/MM3 (ref 140–450)
PMV BLD AUTO: 9.9 FL (ref 6–12)
RBC # BLD AUTO: 4.16 10*6/MM3 (ref 3.77–5.28)
WBC NRBC COR # BLD: 10.39 10*3/MM3 (ref 3.4–10.8)

## 2023-06-04 PROCEDURE — 63710000001 INSULIN DETEMIR PER 5 UNITS: Performed by: HOSPITALIST

## 2023-06-04 PROCEDURE — 85025 COMPLETE CBC W/AUTO DIFF WBC: CPT | Performed by: HOSPITALIST

## 2023-06-04 PROCEDURE — 82948 REAGENT STRIP/BLOOD GLUCOSE: CPT

## 2023-06-04 PROCEDURE — 97535 SELF CARE MNGMENT TRAINING: CPT

## 2023-06-04 PROCEDURE — 97110 THERAPEUTIC EXERCISES: CPT

## 2023-06-04 PROCEDURE — 97530 THERAPEUTIC ACTIVITIES: CPT

## 2023-06-04 PROCEDURE — 63710000001 INSULIN DETEMIR PER 5 UNITS: Performed by: UROLOGY

## 2023-06-04 PROCEDURE — 25010000002 HEPARIN (PORCINE) PER 1000 UNITS: Performed by: UROLOGY

## 2023-06-04 PROCEDURE — 63710000001 INSULIN ASPART PER 5 UNITS: Performed by: UROLOGY

## 2023-06-04 RX ADMIN — HEPARIN SODIUM 5000 UNITS: 5000 INJECTION INTRAVENOUS; SUBCUTANEOUS at 15:22

## 2023-06-04 RX ADMIN — TAMSULOSIN HYDROCHLORIDE 0.4 MG: 0.4 CAPSULE ORAL at 08:11

## 2023-06-04 RX ADMIN — Medication 1 APPLICATION: at 08:14

## 2023-06-04 RX ADMIN — GABAPENTIN 200 MG: 100 CAPSULE ORAL at 08:11

## 2023-06-04 RX ADMIN — INSULIN ASPART 12 UNITS: 100 INJECTION, SOLUTION INTRAVENOUS; SUBCUTANEOUS at 11:08

## 2023-06-04 RX ADMIN — HYDROCHLOROTHIAZIDE 12.5 MG: 12.5 TABLET ORAL at 08:11

## 2023-06-04 RX ADMIN — HEPARIN SODIUM 5000 UNITS: 5000 INJECTION INTRAVENOUS; SUBCUTANEOUS at 05:40

## 2023-06-04 RX ADMIN — AMLODIPINE BESYLATE 5 MG: 5 TABLET ORAL at 08:11

## 2023-06-04 RX ADMIN — ATORVASTATIN CALCIUM 80 MG: 40 TABLET, FILM COATED ORAL at 08:11

## 2023-06-04 RX ADMIN — ASPIRIN 325 MG: 325 TABLET, COATED ORAL at 08:11

## 2023-06-04 RX ADMIN — HYDROCODONE BITARTRATE AND ACETAMINOPHEN 1 TABLET: 10; 325 TABLET ORAL at 18:09

## 2023-06-04 RX ADMIN — RANOLAZINE 500 MG: 500 TABLET, FILM COATED, EXTENDED RELEASE ORAL at 20:24

## 2023-06-04 RX ADMIN — INSULIN DETEMIR 45 UNITS: 100 INJECTION, SOLUTION SUBCUTANEOUS at 20:28

## 2023-06-04 RX ADMIN — SUCRALFATE 1 G: 1 TABLET ORAL at 20:24

## 2023-06-04 RX ADMIN — VENLAFAXINE HYDROCHLORIDE 75 MG: 75 CAPSULE, EXTENDED RELEASE ORAL at 08:11

## 2023-06-04 RX ADMIN — CLOPIDOGREL BISULFATE 75 MG: 75 TABLET ORAL at 08:11

## 2023-06-04 RX ADMIN — SUCRALFATE 1 G: 1 TABLET ORAL at 16:55

## 2023-06-04 RX ADMIN — INSULIN ASPART 10 UNITS: 100 INJECTION, SOLUTION INTRAVENOUS; SUBCUTANEOUS at 08:10

## 2023-06-04 RX ADMIN — SUCRALFATE 1 G: 1 TABLET ORAL at 11:08

## 2023-06-04 RX ADMIN — PANTOPRAZOLE SODIUM 40 MG: 40 TABLET, DELAYED RELEASE ORAL at 08:11

## 2023-06-04 RX ADMIN — RANOLAZINE 500 MG: 500 TABLET, FILM COATED, EXTENDED RELEASE ORAL at 08:11

## 2023-06-04 RX ADMIN — FOLIC ACID 1000 MCG: 1 TABLET ORAL at 08:11

## 2023-06-04 RX ADMIN — GABAPENTIN 200 MG: 100 CAPSULE ORAL at 20:24

## 2023-06-04 RX ADMIN — Medication 10 ML: at 20:25

## 2023-06-04 RX ADMIN — Medication 10 ML: at 08:14

## 2023-06-04 RX ADMIN — HEPARIN SODIUM 5000 UNITS: 5000 INJECTION INTRAVENOUS; SUBCUTANEOUS at 21:00

## 2023-06-04 RX ADMIN — ARIPIPRAZOLE 5 MG: 5 TABLET ORAL at 08:11

## 2023-06-04 RX ADMIN — INSULIN DETEMIR 30 UNITS: 100 INJECTION, SOLUTION SUBCUTANEOUS at 09:10

## 2023-06-04 RX ADMIN — INSULIN ASPART 10 UNITS: 100 INJECTION, SOLUTION INTRAVENOUS; SUBCUTANEOUS at 16:54

## 2023-06-04 RX ADMIN — FUROSEMIDE 40 MG: 40 TABLET ORAL at 08:11

## 2023-06-04 RX ADMIN — SUCRALFATE 1 G: 1 TABLET ORAL at 08:11

## 2023-06-04 RX ADMIN — LEVOTHYROXINE SODIUM 50 MCG: 50 TABLET ORAL at 05:40

## 2023-06-04 RX ADMIN — ISOSORBIDE MONONITRATE 30 MG: 30 TABLET, EXTENDED RELEASE ORAL at 08:11

## 2023-06-04 RX ADMIN — Medication 1 APPLICATION: at 20:24

## 2023-06-04 NOTE — THERAPY TREATMENT NOTE
Acute Care - Physical Therapy Treatment Note  [facility]     Patient Name: Ariana Martinez  : 1960  MRN: [id number]  Today's Date: 2023      Visit Dx:     ICD-10-CM ICD-9-CM   1. Sepsis without acute organ dysfunction, due to unspecified organism  A41.9 038.9     995.91   2. Cellulitis of right lower extremity  L03.115 682.6   3. Type 2 diabetes mellitus with hyperglycemia, unspecified whether long term insulin use  E11.65 250.00   4. Impaired mobility and activities of daily living  Z74.09 V49.89    Z78.9    5. Impaired physical mobility  Z74.09 781.99   6. Impaired mobility and ADLs  Z74.09 V49.89    Z78.9    7. Urinary retention  R33.9 788.20     Patient Active Problem List   Diagnosis    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, with long-term current use of insulin    Closed nondisplaced fracture of fifth left metatarsal bone    MAURICIO (generalized anxiety disorder)    Depression, major, recurrent, moderate (HCC)    GERD without esophagitis    Long term prescription opiate use    Mixed hyperlipidemia    Vitamin D deficiency    Seasonal allergic rhinitis    Restrictive lung disease secondary to obesity    Adult body mass index 37.0-37.9    Snoring    Class 3 severe obesity due to excess calories without serious comorbidity with body mass index (BMI) of 40.0 to 44.9 in adult (HCC)    (HFpEF) heart failure with preserved ejection fraction    Type 2 diabetes mellitus with hyperglycemia, with long-term current use of insulin (HCC)    Cyanocobalamin deficiency    Coronary artery disease of native artery of native heart with stable angina pectoris    Hypertension    Meniere's disease    Gastroparesis    Pulmonary hypertension (HCC)    Pes cavus    Primary osteoarthritis involving multiple joints    Generalized anxiety disorder    Chronic right-sided low back pain with right-sided sciatica    Chest pain    Adverse effect of iron    Chest pain due to myocardial ischemia     Nonrheumatic tricuspid valve regurgitation    Iron deficiency anemia due to chronic blood loss    Malaise and fatigue    Ankle arthritis    Urinary retention    Endocarditis    Essential hypertension    Occlusion and stenosis of bilateral carotid arteries    S/P BKA (below knee amputation) unilateral, left (HCC)    Phantom pain after amputation of lower extremity    S/P CABG (coronary artery bypass graft)    Severe malnutrition    TIA (transient ischemic attack)    Coronary artery abnormality    Elevated d-dimer    Neuropathy    Chest pain, unspecified type    Acute pain of right shoulder    Rotator cuff syndrome, right    Acquired hypothyroidism    Bilateral leg edema    Left elbow pain    Gastritis    Olecranon bursitis, left elbow    Sepsis without acute organ dysfunction, due to unspecified organism     Past Medical History:   Diagnosis Date    Acute bacterial endocarditis 3/13/2021    Angina, class IV     Anxiety     Anxiety and depression     Arthritis     Benign paroxysmal positional vertigo     Bladder disorder     has bladder stimulator    Carpal tunnel syndrome     CHF (congestive heart failure)     Chronic pain     Coronary atherosclerosis     hx CABG 2005.  is followed by Dr Cher Amaya     Diabetes mellitus     Type 2, controlled    Diabetic polyneuropathy     Disease of thyroid gland     Elevated cholesterol     Female stress incontinence     Foot pain, left     Full dentures     Gastroparesis     GERD (gastroesophageal reflux disease)     Hyperlipidemia     Hypertension     Low back pain     Malaise and fatigue     Multiple joint pain     Obesity     Refuses to be weighed    Occlusion and stenosis of bilateral carotid arteries 6/18/2021    Otalgia     Both    Perforation of tympanic membrane     Left    Postoperative wound infection     Vitamin D deficiency     Wears glasses     reading     Past Surgical History:   Procedure Laterality Date    ABDOMINAL SURGERY      AMPUTATION FOOT       ANGIOPLASTY      coronary    ANKLE ARTHROSCOPY Left 02/26/2021    Procedure: Left foot hardware removal, ankle arthroscopy, bone grafting , left foot exostectomy;  Surgeon: Ignacio Lord DPM;  Location: [facility] OR;  Service: Podiatry;  Laterality: Left;    BREAST BIOPSY Right     CARDIAC CATHETERIZATION      CARDIAC CATHETERIZATION N/A 06/20/2017    Procedure: Right Heart Cath;  Surgeon: Can Kwon MD PhD;  Location: [facility] CATH INVASIVE LOCATION;  Service:     CARDIAC CATHETERIZATION N/A 02/18/2020    Procedure: Left Heart Cath;  Surgeon: Catalina Cooper MD;  Location: [facility] CATH INVASIVE LOCATION;  Service: Cardiology;  Laterality: N/A;    CARDIAC CATHETERIZATION N/A 04/06/2022    Procedure: Left Heart Cath;  Surgeon: Sheryl Navas MD;  Location: [facility] CATH INVASIVE LOCATION;  Service: Cardiology;  Laterality: N/A;    CARPAL TUNNEL RELEASE      CHOLECYSTECTOMY      COLONOSCOPY N/A 06/24/2020    Procedure: COLONOSCOPY;  Surgeon: Julián Maldonado MD;  Location: [facility] ENDOSCOPY;  Service: Gastroenterology;  Laterality: N/A;    CORONARY ARTERY BYPASS GRAFT  2005    ENDOSCOPY N/A 10/19/2018    Procedure: ESOPHAGOGASTRODUODENOSCOPY possible dilation;  Surgeon: Julián Maldonado MD;  Location: [facility] ENDOSCOPY;  Service: Gastroenterology    ENDOSCOPY N/A 06/24/2020    Procedure: ESOPHAGOGASTRODUODENOSCOPY WED appt please;  Surgeon: Julián Maldonado MD;  Location: [facility] ENDOSCOPY;  Service: Gastroenterology;  Laterality: N/A;    ENDOSCOPY N/A 06/10/2022    Procedure: ESOPHAGOGASTRODUODENOSCOPY   room 380;  Surgeon: Jeremiah Wilkins MD;  Location: [facility] ENDOSCOPY;  Service: Gastroenterology;  Laterality: N/A;    ENDOSCOPY N/A 1/10/2023    Procedure: ESOPHAGOGASTRODUODENOSCOPY;  Surgeon: Jeremiah Wilkins MD;  Location: [facility] ENDOSCOPY;  Service: Gastroenterology;  Laterality: N/A;    ENDOSCOPY AND COLONOSCOPY      FOOT SURGERY      Toes    FOOT SURGERY      GASTRIC BANDING      Revision,  laparoscopic    HYSTERECTOMY      INCISION AND DRAINAGE LEG Left 03/12/2021    Procedure: Left ankle arthroscopic irrigation and debridement, screw removal;  Surgeon: Ignacio Lord DPM;  Location: [facility];  Service: Podiatry;  Laterality: Left;    MOUTH SURGERY      SALPINGO OOPHORECTOMY      SHOULDER SURGERY      SUBTALAR ARTHRODESIS Left 01/16/2019    Procedure: LEFT FOOT HARDWARE REMOVAL, FIFTH METATARSAL , OPEN REDUCTION INTERNAL FIXATION, CALCANEAL OSTEOTOMY;  Surgeon: Ignacio Lord DPM;  Location: [facility] OR;  Service: Podiatry    SUBTALAR ARTHRODESIS Left 10/16/2019    Procedure: foot hardware removal, subtalar joint fusion  possible de/reattachment of achilles tendon        (c-arm);  Surgeon: Ignacio Lord DPM;  Location: [facility];  Service: Podiatry    SUBTALAR ARTHRODESIS Left 09/30/2020    Procedure: subtalar, talonavicular joint arthrodesis.  Removal hardware.          (c-arm);  Surgeon: Ignacio Lord DPM;  Location: [facility];  Service: Podiatry;  Laterality: Left;    TRANSESOPHAGEAL ECHOCARDIOGRAM (LAMONTE)      With color flow     PT Assessment (last 12 hours)       PT Evaluation and Treatment       Row Name 06/04/23 1400          Physical Therapy Time and Intention    Subjective Information complains of;pain;weakness  -TW     Document Type therapy note (daily note)  -TW     Mode of Treatment physical therapy;individual therapy  -TW     Patient Effort good  -TW       Row Name 06/04/23 6933          General Information    Patient Profile Reviewed yes  -TW     Patient Observations alert;cooperative;agree to therapy  -TW     Patient/Family/Caregiver Comments/Observations Pt's  present but left room when tx started  -TW     General Observations of Patient Pt sup in bed without O2 applied.  -TW     Existing Precautions/Restrictions fall  -TW       Row Name 06/04/23 5120          Pain    Pretreatment Pain Rating 2/10  -TW     Pain Location lower  -TW     Pain Location -  extremity  -TW       Row Name 06/04/23 1405          Cognition    Affect/Mental Status (Cognition) WFL  -TW     Orientation Status (Cognition) oriented x 4  -TW     Follows Commands (Cognition) WFL  -TW     Personal Safety Interventions fall prevention program maintained;muscle strengthening facilitated;nonskid shoes/slippers when out of bed  -TW       Row Name 06/04/23 1405          Bed Mobility    Scooting/Bridging [place] (Bed Mobility) modified independence  -TW     Supine-Sit [place] (Bed Mobility) modified independence  -TW     Sit-Supine [place] (Bed Mobility) modified independence  -TW     Assistive Device (Bed Mobility) bed rails;head of bed elevated  -TW     Comment, (Bed Mobility) Pt sat EOB 15 minutes to perform B LE ther ex  -TW       Row Name 06/04/23 1405          Sit-Stand Transfer    Sit-Stand [place] (Transfers) not tested  -TW       Row Name 06/04/23 1405          Stand-Sit Transfer    Stand-Sit [place] (Transfers) not tested  -TW       Row Name 06/04/23 1405          Gait/Stairs (Locomotion)    [place] Level (Gait) not tested  -TW     Comment, (Gait/Stairs) Pt defers standing and gait due to fatigue.  -TW       Row Name 06/04/23 1405          Hip (Therapeutic Exercise)    Hip (Therapeutic Exercise) AROM (active range of motion);isometric exercises  -TW     Hip AROM (Therapeutic Exercise) bilateral;sitting  -TW     Hip Isometrics (Therapeutic Exercise) bilateral;supine  -TW       Row Name 06/04/23 1405          Knee (Therapeutic Exercise)    Knee (Therapeutic Exercise) AROM (active range of motion)  -TW     Knee AROM (Therapeutic Exercise) bilateral;sitting  -TW     Knee Isometrics (Therapeutic Exercise) bilateral;supine  -TW       Row Name 06/04/23 1405          Ankle (Therapeutic Exercise)    Ankle AROM (Therapeutic Exercise) right;sitting;supine  -TW       Row Name             Wound 05/17/23 1828 Right anterior fifth toe    Wound - Properties Group Placement Date:  05/17/23  - Placement Time: 1828 -JH Side: Right  -JH Orientation: anterior  -JH Location: fifth toe  -JH    Retired Wound - Properties Group Placement Date: 05/17/23  -JH Placement Time: 1828 -JH Side: Right  -JH Orientation: anterior  -JH Location: fifth toe  -JH    Retired Wound - Properties Group Date first assessed: 05/17/23  -JH Time first assessed: 1828  -JH Side: Right  -JH Location: fifth toe  -JH      Row Name             Wound 05/23/23 1006 Right distal leg    Wound - Properties Group Placement Date: 05/23/23  -LH Placement Time: 1006  -LH Present on Hospital Admission: N  -LH Side: Right  -LH Orientation: distal  -LH Location: leg  -LH    Retired Wound - Properties Group Placement Date: 05/23/23  - Placement Time: 1006  -LH Present on Hospital Admission: N  -LH Side: Right  -LH Orientation: distal  -LH Location: leg  -LH    Retired Wound - Properties Group Date first assessed: 05/23/23  - Time first assessed: 1006  -LH Present on Hospital Admission: N  -LH Side: Right  -LH Location: leg  -LH      Row Name 06/04/23 1405          Plan of Care Review    Plan of Care Reviewed With patient  -TW     Progress improving  -TW     Outcome Evaluation Pt agreeable to therapy but declined standing or t/f OOB. Pt performed sup ther ex for ISO ex's with B LE and then t/f to sitting EOB with mod ind and performed seated B LE tehr ex. Pt returned to sup and was positioned for comfort. Pt with low O2 upon arrival to room but O2 was not applied. After applying O2, pt was able to get O2 sats to 92%. Pt would cont to benefit fromt herapy at this time.  -TW       Row Name 06/04/23 1405          Vital Signs    Pre Systolic BP Rehab 131  -TW     Pre Treatment Diastolic BP 58  -TW     Pretreatment Heart Rate (beats/min) 78  -TW     Intratreatment Heart Rate (beats/min) 86  -TW     Posttreatment Heart Rate (beats/min) 86  -TW     Pre SpO2 (%) 86  O2 incresed to 92% fter applying O2.  -TW     O2 Delivery Pre Treatment room  air  -TW     Intra SpO2 (%) 97  -TW     O2 Delivery Intra Treatment supplemental O2  -TW     Post SpO2 (%) 94  -TW     O2 Delivery Post Treatment supplemental O2  -TW     Pre Patient Position Supine  -TW     Intra Patient Position Sitting  -TW     Post Patient Position Supine  -TW       Row Name 06/04/23 1405          Positioning and Restraints    Pre-Treatment Position in bed  -TW     Post Treatment Position bed  -TW     In Bed supine;call light within reach;encouraged to call for assist;exit alarm on  -TW       Row Name 06/04/23 1405          Therapy Assessment/Plan (PT)    Rehab Potential (PT) good, to achieve stated therapy goals  -TW       Row Name 06/04/23 1405          Bed Mobility Goal 1 (PT)    Activity/Assistive Device (Bed Mobility Goal 1, PT) bed mobility activities, all  -TW     [place] Level/Cues Needed (Bed Mobility Goal 1, PT) standby assist;independent  -TW     Time Frame (Bed Mobility Goal 1, PT) by discharge  -TW     Progress/Outcomes (Bed Mobility Goal 1, PT) goal met   -TW       Row Name 06/04/23 1405          Transfer Goal 1 (PT)    Activity/Assistive Device (Transfer Goal 1, PT) sit-to-stand/stand-to-sit;bed-to-chair/chair-to-bed  -TW     [place] Level/Cues Needed (Transfer Goal 1, PT) contact guard required;standby assist;modified independence  -TW     Time Frame (Transfer Goal 1, PT) by discharge  -TW     Progress/Outcome (Transfer Goal 1, PT) goal partially met;goal ongoing  partially met with CGA with sit to stand to sit  -TW       Row Name 06/04/23 1405          Gait Training Goal 1 (PT)    Activity/Assistive Device (Gait Training Goal 1, PT) gait (walking locomotion);decrease fall risk  -TW     [place] Level (Gait Training Goal 1, PT) contact guard required;standby assist;modified independence  -TW     Distance (Gait Training Goal 1, PT) 50ft or more each trip  -TW     Time Frame (Gait Training Goal 1, PT) 1 week  -TW     Progress/Outcome (Gait Training Goal 1, PT) goal  not met  -TW       Row Name 06/04/23 1405          ROM Goal 1 (PT)    ROM Goal 1 (PT) Pt will tolerate LE exercises OOB in chair with VSS  -TW     Time Frame (ROM Goal 1, PT) by discharge  -TW     Progress/Outcome (ROM Goal 1, PT) goal met   -TW               User Key  (r) = Recorded By, (t) = Taken By, (c) = Cosigned By      Initials Name Provider Type    TW Thierno Esteves, MANJINDER Physical Therapist Assistant    Juany Sage RN Registered Nurse    Janae Severino LPN Licensed Nurse                    Physical Therapy Education       Title: PT OT SLP Therapies (Done)       Topic: Physical Therapy (Done)       Point: Mobility training (Done)       Learning Progress Summary             Patient Acceptance, E,TB, VU by LW at 6/4/2023 1458    Acceptance, E, VU by AB at 6/1/2023 0535    Acceptance, E, VU by AB at 5/31/2023 0519    Acceptance, E, NR by AME at 5/29/2023 1123    Acceptance, E, NR by AME at 5/26/2023 1356    Acceptance, E,TB, VU by LW at 5/25/2023 1459    Acceptance, E, NR by AME at 5/24/2023 1306    Acceptance, E,TB, VU by NB at 5/18/2023 2138    Acceptance, E, NR by JC1 at 5/18/2023 1407                         Point: Home exercise program (Done)       Learning Progress Summary             Patient Acceptance, E,TB, VU by LW at 6/4/2023 1458    Acceptance, E, VU by AB at 6/1/2023 0535    Acceptance, E, VU by AB at 5/31/2023 0519    Acceptance, E, VU by AB at 5/30/2023 0516    Acceptance, E,TB, VU by LW at 5/25/2023 1459                         Point: Body mechanics (Done)       Learning Progress Summary             Patient Acceptance, E,TB, VU by LW at 6/4/2023 1458    Acceptance, E, VU by AB at 6/1/2023 0535    Acceptance, E, VU by AB at 5/31/2023 0519    Acceptance, E, VU by AB at 5/30/2023 0516    Acceptance, E,TB, VU by LW at 5/25/2023 1459    Acceptance, E,TB, VU by  at 5/25/2023 1451                         Point: Precautions (Done)       Learning Progress Summary             Patient  Acceptance, E,TB, VU by LW at 6/4/2023 1458    Acceptance, E, VU by AB at 6/1/2023 0535    Acceptance, E, VU by AB at 5/31/2023 0519    Acceptance, E, VU by AB at 5/30/2023 0516    Acceptance, E,TB, VU by  at 5/25/2023 1459    Acceptance, E, NR by [facility] at 5/18/2023 1407                                         User Key       Initials Effective Dates Name Provider Type Discipline    [facility] 06/16/21 -  Aure Villaseñor, PT Physical Therapist PT     06/16/21 -  Ousmane Zhang PTA Physical Therapist Assistant PT    LW 06/16/21 -  Sarah Irby COTA Occupational Therapist Assistant OT    NB 06/16/21 -  Samantha Cuellar RN Registered Nurse Nurse     09/08/22 -  Jaane Amaya LPN Licensed Nurse Nurse    AB 11/07/22 -  Joana Mc LPN Licensed Nurse Nurse                  PT Recommendation and Plan  Anticipated Discharge Disposition (PT): inpatient rehabilitation facility  Plan of Care Reviewed With: patient  Progress: improving  Outcome Evaluation: Pt agreeable to therapy but declined standing or t/f OOB. Pt performed sup ther ex for ISO ex's with B LE and then t/f to sitting EOB with mod ind and performed seated B LE tehr ex. Pt returned to sup and was positioned for comfort. Pt with low O2 upon arrival to room but O2 was not applied. After applying O2, pt was able to get O2 sats to 92%. Pt would cont to benefit fromt herapy at this time.       Time Calculation:    PT Charges       Row Name 06/04/23 1554             Time Calculation    Start Time 1405  -TW      Stop Time 1433  -TW      Time Calculation (min) 28 min  -TW         Time Calculation- PT    Total Timed Code Minutes- PT 28 minute(s)  -TW                User Key  (r) = Recorded By, (t) = Taken By, (c) = Cosigned By      Initials Name Provider Type    TW Thierno Esteves PTA Physical Therapist Assistant                  Therapy Charges for Today       Code Description Service Date Service Provider Modifiers Qty    67615664031 HC PT THER SUPP EA  15 MIN 6/3/2023 Thierno Esteves, PTA GP 1    37288197364 HC PT THER PROC EA 15 MIN 6/4/2023 Thierno Esteves, MANJINDER GP 1    [id number]  PT THERAPEUTIC ACT EA 15 MIN 6/4/2023 Thierno Esteves, MANJINDER GP 1            PT G-Codes  Outcome Measure Options: AM-PAC 6 Clicks Daily Activity (OT)  AM-PAC 6 Clicks Score (PT): 15  AM-PAC 6 Clicks Score (OT): 21    Thierno Esteves PTA  6/4/2023

## 2023-06-04 NOTE — THERAPY TREATMENT NOTE
Patient Name: Ariana Martinez  : 1960    MRN: [id number]                              Today's Date: 2023       Admit Date: 2023    Visit Dx:     ICD-10-CM ICD-9-CM   1. Sepsis without acute organ dysfunction, due to unspecified organism  A41.9 038.9     995.91   2. Cellulitis of right lower extremity  L03.115 682.6   3. Type 2 diabetes mellitus with hyperglycemia, unspecified whether long term insulin use  E11.65 250.00   4. Impaired mobility and activities of daily living  Z74.09 V49.89    Z78.9    5. Impaired physical mobility  Z74.09 781.99   6. Impaired mobility and ADLs  Z74.09 V49.89    Z78.9    7. Urinary retention  R33.9 788.20     Patient Active Problem List   Diagnosis    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, with long-term current use of insulin    Closed nondisplaced fracture of fifth left metatarsal bone    MAURICIO (generalized anxiety disorder)    Depression, major, recurrent, moderate (HCC)    GERD without esophagitis    Long term prescription opiate use    Mixed hyperlipidemia    Vitamin D deficiency    Seasonal allergic rhinitis    Restrictive lung disease secondary to obesity    Adult body mass index 37.0-37.9    Snoring    Class 3 severe obesity due to excess calories without serious comorbidity with body mass index (BMI) of 40.0 to 44.9 in adult (HCC)    (HFpEF) heart failure with preserved ejection fraction    Type 2 diabetes mellitus with hyperglycemia, with long-term current use of insulin (HCC)    Cyanocobalamin deficiency    Coronary artery disease of native artery of native heart with stable angina pectoris    Hypertension    Meniere's disease    Gastroparesis    Pulmonary hypertension (HCC)    Pes cavus    Primary osteoarthritis involving multiple joints    Generalized anxiety disorder    Chronic right-sided low back pain with right-sided sciatica    Chest pain    Adverse effect of iron    Chest pain due to myocardial ischemia    Nonrheumatic  tricuspid valve regurgitation    Iron deficiency anemia due to chronic blood loss    Malaise and fatigue    Ankle arthritis    Urinary retention    Endocarditis    Essential hypertension    Occlusion and stenosis of bilateral carotid arteries    S/P BKA (below knee amputation) unilateral, left (HCC)    Phantom pain after amputation of lower extremity    S/P CABG (coronary artery bypass graft)    Severe malnutrition    TIA (transient ischemic attack)    Coronary artery abnormality    Elevated d-dimer    Neuropathy    Chest pain, unspecified type    Acute pain of right shoulder    Rotator cuff syndrome, right    Acquired hypothyroidism    Bilateral leg edema    Left elbow pain    Gastritis    Olecranon bursitis, left elbow    Sepsis without acute organ dysfunction, due to unspecified organism     Past Medical History:   Diagnosis Date    Acute bacterial endocarditis 3/13/2021    Angina, class IV     Anxiety     Anxiety and depression     Arthritis     Benign paroxysmal positional vertigo     Bladder disorder     has bladder stimulator    Carpal tunnel syndrome     CHF (congestive heart failure)     Chronic pain     Coronary atherosclerosis     hx CABG 2005.  is followed by Dr Cher Amaya     Diabetes mellitus     Type 2, controlled    Diabetic polyneuropathy     Disease of thyroid gland     Elevated cholesterol     Female stress incontinence     Foot pain, left     Full dentures     Gastroparesis     GERD (gastroesophageal reflux disease)     Hyperlipidemia     Hypertension     Low back pain     Malaise and fatigue     Multiple joint pain     Obesity     Refuses to be weighed    Occlusion and stenosis of bilateral carotid arteries 6/18/2021    Otalgia     Both    Perforation of tympanic membrane     Left    Postoperative wound infection     Vitamin D deficiency     Wears glasses     reading     Past Surgical History:   Procedure Laterality Date    ABDOMINAL SURGERY      AMPUTATION FOOT      ANGIOPLASTY       coronary    ANKLE ARTHROSCOPY Left 02/26/2021    Procedure: Left foot hardware removal, ankle arthroscopy, bone grafting , left foot exostectomy;  Surgeon: Ignacio Lord DPM;  Location: [facility] OR;  Service: Podiatry;  Laterality: Left;    BREAST BIOPSY Right     CARDIAC CATHETERIZATION      CARDIAC CATHETERIZATION N/A 06/20/2017    Procedure: Right Heart Cath;  Surgeon: Can Kwon MD PhD;  Location: [facility] CATH INVASIVE LOCATION;  Service:     CARDIAC CATHETERIZATION N/A 02/18/2020    Procedure: Left Heart Cath;  Surgeon: Catalina Cooper MD;  Location: [facility] CATH INVASIVE LOCATION;  Service: Cardiology;  Laterality: N/A;    CARDIAC CATHETERIZATION N/A 04/06/2022    Procedure: Left Heart Cath;  Surgeon: Sheryl Navas MD;  Location: [facility] CATH INVASIVE LOCATION;  Service: Cardiology;  Laterality: N/A;    CARPAL TUNNEL RELEASE      CHOLECYSTECTOMY      COLONOSCOPY N/A 06/24/2020    Procedure: COLONOSCOPY;  Surgeon: Julián Maldonado MD;  Location: [facility] ENDOSCOPY;  Service: Gastroenterology;  Laterality: N/A;    CORONARY ARTERY BYPASS GRAFT  2005    ENDOSCOPY N/A 10/19/2018    Procedure: ESOPHAGOGASTRODUODENOSCOPY possible dilation;  Surgeon: Julián Maldonado MD;  Location: [facility] ENDOSCOPY;  Service: Gastroenterology    ENDOSCOPY N/A 06/24/2020    Procedure: ESOPHAGOGASTRODUODENOSCOPY WED appt please;  Surgeon: Julián Maldonado MD;  Location: [facility] ENDOSCOPY;  Service: Gastroenterology;  Laterality: N/A;    ENDOSCOPY N/A 06/10/2022    Procedure: ESOPHAGOGASTRODUODENOSCOPY   room 380;  Surgeon: Jeremiah Wilkins MD;  Location: [facility] ENDOSCOPY;  Service: Gastroenterology;  Laterality: N/A;    ENDOSCOPY N/A 1/10/2023    Procedure: ESOPHAGOGASTRODUODENOSCOPY;  Surgeon: Jeremiah Wilkins MD;  Location: [facility] ENDOSCOPY;  Service: Gastroenterology;  Laterality: N/A;    ENDOSCOPY AND COLONOSCOPY      FOOT SURGERY      Toes    FOOT SURGERY      GASTRIC BANDING      Revision, laparoscopic     HYSTERECTOMY      INCISION AND DRAINAGE LEG Left 03/12/2021    Procedure: Left ankle arthroscopic irrigation and debridement, screw removal;  Surgeon: Ignacio Lord DPM;  Location: [facility];  Service: Podiatry;  Laterality: Left;    MOUTH SURGERY      SALPINGO OOPHORECTOMY      SHOULDER SURGERY      SUBTALAR ARTHRODESIS Left 01/16/2019    Procedure: LEFT FOOT HARDWARE REMOVAL, FIFTH METATARSAL , OPEN REDUCTION INTERNAL FIXATION, CALCANEAL OSTEOTOMY;  Surgeon: Ignacio Lord DPM;  Location: [facility];  Service: Podiatry    SUBTALAR ARTHRODESIS Left 10/16/2019    Procedure: foot hardware removal, subtalar joint fusion  possible de/reattachment of achilles tendon        (c-arm);  Surgeon: Ignacio Lord DPM;  Location: [facility];  Service: Podiatry    SUBTALAR ARTHRODESIS Left 09/30/2020    Procedure: subtalar, talonavicular joint arthrodesis.  Removal hardware.          (c-arm);  Surgeon: Ignacio Lord DPM;  Location: [facility];  Service: Podiatry;  Laterality: Left;    TRANSESOPHAGEAL ECHOCARDIOGRAM (LAMONTE)      With color flow      General Information       Row Name 06/04/23 1453          OT Time and Intention    Document Type therapy note (daily note)  -LW     Mode of Treatment individual therapy;occupational therapy  -       Row Name 06/04/23 1453          General Information    Patient Profile Reviewed yes  -LW     Existing Precautions/Restrictions fall  -       Row Name 06/04/23 1453          Cognition    Orientation Status (Cognition) oriented x 4  -       Row Name 06/04/23 1453          Safety Issues, Functional Mobility    Impairments Affecting Function (Mobility) balance;endurance/activity tolerance;pain;strength  -               User Key  (r) = Recorded By, (t) = Taken By, (c) = Cosigned By      Initials Name Provider Type    LW Sarah Irby COTA Occupational Therapist Assistant                     Mobility/ADL's       Row Name 06/04/23 1453          Bed Mobility    Bed  Mobility supine-sit;sit-supine  -LW     Supine-Sit [place] (Bed Mobility) modified independence  -LW     Sit-Supine [place] (Bed Mobility) modified independence  -       Row Name 06/04/23 1453          Activities of Daily Living    BADL Assessment/Intervention upper body dressing;grooming  -       Row Name 06/04/23 1453          Grooming Assessment/Training    [place] Level (Grooming) grooming skills;hair care, combing/brushing;oral care regimen;wash face, hands;set up  -LW     Position (Grooming) edge of bed sitting  -LW     Comment, (Grooming) Pan washed hair  -       Row Name 06/04/23 1453          Upper Body Dressing Assessment/Training    [place] Level (Upper Body Dressing) upper body dressing skills;doff;don;set up  Hospital gown  -               User Key  (r) = Recorded By, (t) = Taken By, (c) = Cosigned By      Initials Name Provider Type    Sarah Davies COTA Occupational Therapist Assistant                   Obj/Interventions    No documentation.                  Goals/Plan       Row Name 06/04/23 0940          Transfer Goal 1 (OT)    Activity/Assistive Device (Transfer Goal 1, OT) toilet;wheelchair transfer  -     [place] Level/Cues Needed (Transfer Goal 1, OT) modified independence  -LW     Time Frame (Transfer Goal 1, OT) long term goal (LTG)  -LW     Progress/Outcome (Transfer Goal 1, OT) goal not met  -       Row Name 06/04/23 0940          Bathing Goal 1 (OT)    Activity/Device (Bathing Goal 1, OT) bathing skills, all  -LW     [place] Level/Cues Needed (Bathing Goal 1, OT) modified independence  -LW     Time Frame (Bathing Goal 1, OT) long term goal (LTG)  -LW     Progress/Outcomes (Bathing Goal 1, OT) goal not met  -       Row Name 06/04/23 0940          Dressing Goal 1 (OT)    Activity/Device (Dressing Goal 1, OT) lower body dressing  -LW     [place]/Cues Needed (Dressing Goal 1, OT) modified independence  -LW     Time Frame (Dressing Goal 1,  OT) long term goal (LTG)  -LW     Progress/Outcome (Dressing Goal 1, OT) goal not met  -LW       Row Name 06/04/23 0940          Toileting Goal 1 (OT)    Activity/Device (Toileting Goal 1, OT) toileting skills, all  -LW     [place] Level/Cues Needed (Toileting Goal 1, OT) modified independence  -LW     Time Frame (Toileting Goal 1, OT) long term goal (LTG)  -LW     Progress/Outcome (Toileting Goal 1, OT) goal not met  -LW       Row Name 06/04/23 0940          Self-Feeding Goal 1 (OT)    Activity/Device (Self-Feeding Goal 1, OT) self-feeding skills, all  -LW     [place] Level/Cues Needed (Self-Feeding Goal 1, OT) modified independence  -LW     Time Frame (Self-Feeding Goal 1, OT) long term goal (LTG)  -LW     Progress/Outcomes (Self-Feeding Goal 1, OT) goal met  -LW               User Key  (r) = Recorded By, (t) = Taken By, (c) = Cosigned By      Initials Name Provider Type    LW Sarah Irby COTA Occupational Therapist Assistant                   Clinical Impression       Row Name 06/04/23 1455          Pain Assessment    Pretreatment Pain Rating 3/10  -LW     Posttreatment Pain Rating 3/10  -LW     Pain Location - Side/Orientation Right  -LW     Pain Location lower  -LW     Pain Location - extremity  -       Row Name 06/04/23 1455          Plan of Care Review    Plan of Care Reviewed With patient  -LW     Progress improving  -LW     Outcome Evaluation Patient supine in bed. Supine to sit<>sit to supine SBA. Grooming EOB Setup. UB dressing Setup. Discussed Home safety with patient. Pt supine in bed all needs in reach. Pt would continue to benefit from OT services.  -       Row Name 06/04/23 1455          Positioning and Restraints    Pre-Treatment Position in bed  -LW     Post Treatment Position bed  -LW     In Bed supine;call light within reach;encouraged to call for assist;exit alarm on;with family/caregiver  -LW               User Key  (r) = Recorded By, (t) = Taken By, (c) = Cosigned By       Initials Name Provider Type    Sarah Davies COTA Occupational Therapist Assistant                   Outcome Measures       Row Name 06/04/23 1458          How much help from another is currently needed...    Putting on and taking off regular lower body clothing? 3  -LW     Bathing (including washing, rinsing, and drying) 3  -LW     Toileting (which includes using toilet bed pan or urinal) 3  -LW     Putting on and taking off regular upper body clothing 4  -LW     Taking care of personal grooming (such as brushing teeth) 4  -LW     Eating meals 4  -LW     AM-PAC 6 Clicks Score (OT) 21  -LW       Row Name 06/04/23 1238          How much help from another person do you currently need...    Turning from your back to your side while in flat bed without using bedrails? 3  -LR     Moving from lying on back to sitting on the side of a flat bed without bedrails? 3  -LR     Moving to and from a bed to a chair (including a wheelchair)? 3  -LR     Standing up from a chair using your arms (e.g., wheelchair, bedside chair)? 3  -LR     Climbing 3-5 steps with a railing? 1  -LR     To walk in hospital room? 2  -LR     AM-PAC 6 Clicks Score (PT) 15  -LR     Highest level of mobility 4 --> Transferred to chair/commode  -LR               User Key  (r) = Recorded By, (t) = Taken By, (c) = Cosigned By      Initials Name Provider Type    Niyah Moreno RN Registered Nurse    Sarah Davies COTA Occupational Therapist Assistant                    Occupational Therapy Education       Title: PT OT SLP Therapies (Done)       Topic: Occupational Therapy (Done)       Point: ADL training (Done)       Description:   Instruct learner(s) on proper safety adaptation and remediation techniques during self care or transfers.   Instruct in proper use of assistive devices.                  Learning Progress Summary             Patient Acceptance, E,TB, VU by KIMBERLY at 6/4/2023 1458    Acceptance, E, VU by AB at 6/1/2023 0524     Acceptance, E, VU by AB at 5/31/2023 0519    Acceptance, E,TB, VU by MC at 5/30/2023 1133    Comment: OT role, POC, t/f training    Acceptance, E, VU by AB at 5/30/2023 0516    Acceptance, E,TB, VU by LW at 5/26/2023 1516    Acceptance, E,TB, VU by LW at 5/25/2023 1459    Acceptance, E,TB, VU by RW at 5/22/2023 1243    Comment: POC, Role of OT    Acceptance, E,TB, VU by BB at 5/20/2023 1358    Acceptance, E,TB, VU by BB at 5/20/2023 1357                         Point: Home exercise program (Done)       Description:   Instruct learner(s) on appropriate technique for monitoring, assisting and/or progressing therapeutic exercises/activities.                  Learning Progress Summary             Patient Acceptance, E,TB, VU by LW at 6/4/2023 1458    Acceptance, E, VU by AB at 6/1/2023 0535    Acceptance, E, VU by AB at 5/31/2023 0519    Acceptance, E, VU by AB at 5/30/2023 0516    Acceptance, E,TB, VU by LW at 5/26/2023 1516    Acceptance, E,TB, VU by LW at 5/25/2023 1459    Acceptance, E,TB, VU by BB at 5/20/2023 1356                         Point: Precautions (Done)       Description:   Instruct learner(s) on prescribed precautions during self-care and functional transfers.                  Learning Progress Summary             Patient Acceptance, E,TB, VU by LW at 6/4/2023 1458    Acceptance, E, VU by AB at 6/1/2023 0535    Acceptance, E, VU by AB at 5/31/2023 0519    Acceptance, E,TB, VU by MC at 5/30/2023 1133    Comment: OT role, POC, t/f training    Acceptance, E, VU by AB at 5/30/2023 0516    Acceptance, E,TB, VU by LW at 5/26/2023 1516    Acceptance, E,TB, VU by LW at 5/25/2023 1459    Acceptance, E,TB, VU by RW at 5/22/2023 1243    Comment: POC, Role of OT    Acceptance, E, DU by RB at 5/18/2023 9982    Comment: Pt edu on use of gait belt and non skid socks when OOB and no OOB without assist.                         Point: Body mechanics (Done)       Description:   Instruct learner(s) on proper positioning  and spine alignment during self-care, functional mobility activities and/or exercises.                  Learning Progress Summary             Patient Acceptance, E,TB, VU by LW at 6/4/2023 1458    Acceptance, E, VU by AB at 6/1/2023 0535    Acceptance, E, VU by AB at 5/31/2023 0519    Acceptance, E,TB, VU by MC at 5/30/2023 1133    Comment: OT role, POC, t/f training    Acceptance, E, VU by AB at 5/30/2023 0516    Acceptance, E,TB, VU by LW at 5/26/2023 1516    Acceptance, E,TB, VU by LW at 5/25/2023 1459    Acceptance, E,TB, VU by RW at 5/22/2023 1243    Comment: POC, Role of OT    Acceptance, E,TB, VU by BB at 5/20/2023 1358    Acceptance, E,TB, VU by BB at 5/20/2023 1357                                         User Key       Initials Effective Dates Name Provider Type Discipline    RB 06/16/21 - 05/30/23 Fredi France, OT Occupational Therapist OT    BB 06/16/21 -  Matilde Rios COTA Occupational Therapist Assistant OT    LW 06/16/21 -  Sarah Irby COTA Occupational Therapist Assistant OT    MC 10/19/22 -  Dana Burger, OT Occupational Therapist OT    RW 09/22/22 -  Missy Esteves, OT Occupational Therapist OT    AB 11/07/22 -  Joana Mc LPN Licensed Nurse Nurse                  OT Recommendation and Plan     Plan of Care Review  Plan of Care Reviewed With: patient  Progress: improving  Outcome Evaluation: Patient supine in bed. Supine to sit<>sit to supine SBA. Grooming EOB Setup. UB dressing Setup. Discussed Home safety with patient. Pt supine in bed all needs in reach. Pt would continue to benefit from OT services.     Time Calculation:    Time Calculation- OT       Row Name 06/04/23 1459             Time Calculation- OT    OT Start Time 0940  -LW      OT Stop Time 1020  -LW      OT Time Calculation (min) 40 min  -LW      Total Timed Code Minutes- OT 40 minute(s)  -LW      OT Received On 06/04/23  -LW         Timed Charges    01878 - OT Self Care/Mgmt Minutes 40  -LW         Total  Minutes    Timed Charges Total Minutes 40  -LW       Total Minutes 40  -LW                User Key  (r) = Recorded By, (t) = Taken By, (c) = Cosigned By      Initials Name Provider Type    Sarah Davies COTA Occupational Therapist Assistant                  Therapy Charges for Today       Code Description Service Date Service Provider Modifiers Qty    [id number] HC OT SELF CARE/MGMT/TRAIN EA 15 MIN 6/4/2023 Sarah Irby COTA GO 3                 LINDA Booth  6/4/2023

## 2023-06-04 NOTE — PLAN OF CARE
Goal Outcome Evaluation:  Plan of Care Reviewed With: patient        Progress: improving  Outcome Evaluation: Pt agreeable to therapy but declined standing or t/f OOB. Pt performed sup ther ex for ISO ex's with B LE and then t/f to sitting EOB with mod ind and performed seated B LE tehr ex. Pt returned to sup and was positioned for comfort. Pt with low O2 upon arrival to room but O2 was not applied. After applying O2, pt was able to get O2 sats to 92%. Pt would cont to benefit fromt herapy at this time.

## 2023-06-04 NOTE — PROGRESS NOTES
[facility] Medicine Services  INPATIENT PROGRESS NOTE    Length of Stay: 18  Date of Admission: 5/17/2023  Primary Care Physician: Dolly Foss APRN    Subjective   (S) Admitted for cough, fever, chills and diagnosed with Right lower extremity cellulitis  Today, she is lying down and no complains reported    Review of Systems   All other systems reviewed and are negative.   All pertinent negatives and positives are as above. All other systems have been reviewed and are negative unless otherwise stated.     Prior to Admission medications    Medication Sig Start Date End Date Taking? Authorizing Provider   ARIPiprazole (ABILIFY) 5 MG tablet Take 1 tablet by mouth Daily. 3/10/23  Yes Dolly Foss APRN   aspirin  MG tablet Take 1 tablet by mouth Daily. 2/8/22  Yes Mahin Esteves MD   atorvastatin (LIPITOR) 80 MG tablet Take 1 tablet by mouth Daily. 9/7/22  Yes Dolly Foss APRN   butalbital-acetaminophen-caffeine (FIORICET, ESGIC) -40 MG per tablet Take one to two tablets by mouth per headache episode as needed. 4/21/23  Yes Dolly Foss APRN   Calcium Citrate-Vitamin D 250-200 MG-UNIT tablet Take 2 tablets by mouth 2 (two) times a day.   Yes ProviderEsther MD   clopidogrel (PLAVIX) 75 MG tablet Take 1 tablet by mouth Daily. 3/28/23  Yes Dolly Foss APRN   cyanocobalamin 1000 MCG/ML injection INJECT 1 ML INTO THE APPROPRIATE MUSCLE AS DIRECTED BY PRESCRIBER EVERY 28 (TWENTY-EIGHT) DAYS. 4/7/23  Yes Pranav Sutton MD   famotidine (PEPCID) 40 MG tablet Take 1 tablet by mouth Daily. 4/14/23  Yes Esther Henao MD   fluticasone (FLONASE) 50 MCG/ACT nasal spray INSTILL 2 SPRAYS IN EACH NOSTRIL AS DIRECTED BY PROVIDER DAILY  Patient taking differently: As Needed. 2/1/23  Yes Dolly Foss APRN   folic acid (FOLVITE) 1 MG tablet TAKE 1 TABLET BY MOUTH EVERY DAY 4/18/23  Yes Teressa Hammond APRN   furosemide (LASIX) 40 MG tablet TAKE  1 TABLET BY MOUTH EVERY DAY 2/10/23  Yes Dolly Foss APRN   gabapentin (NEURONTIN) 100 MG capsule TAKE 1 CAPSULE BY MOUTH EVERY 12 HOURS 4/17/23  Yes Dolly Foss APRN   hydroCHLOROthiazide (HYDRODIURIL) 12.5 MG tablet TAKE 1 TABLET BY MOUTH EVERY DAY 3/15/23  Yes Dolly Foss APRN   HYDROcodone-acetaminophen (NORCO) 7.5-325 MG per tablet Take 1 tablet by mouth Every 6 (Six) Hours As Needed for Moderate Pain. Pls fill on 5/10 5/5/23  Yes Dolly Foss APRN   insulin aspart (NovoLOG FlexPen) 100 UNIT/ML solution pen-injector sc pen INJECT 30 UNITS UNDER SKIN AS DIRECTED 3 TIMES A DAY WITH MEALS 3/9/23  Yes Elvis Gamboa APRN   Insulin Glargine (BASAGLAR KWIKPEN) 100 UNIT/ML injection pen Inject 40 units into the appropriate area every morning and 35 units into the appropriate area every night 10/24/22  Yes Elvis Gamboa APRN   isosorbide mononitrate (IMDUR) 30 MG 24 hr tablet TAKE 1 TABLET BY MOUTH EVERY DAY 4/11/23  Yes Dolly Foss APRN   levothyroxine (Synthroid) 50 MCG tablet Take 1 tablet by mouth Daily. 4/26/23 4/25/24 Yes Elvis Gamboa APRN   meclizine (ANTIVERT) 25 MG tablet Take 1 tablet by mouth 3 (Three) Times a Day As Needed for dizziness. 12/14/17  Yes Evon Hernandez APRN   methocarbamol (ROBAXIN) 500 MG tablet TAKE 1 TABLET BY MOUTH 2 TIMES A DAY AS NEEDED FOR MUSCLE SPASMS. 6/7/22  Yes Kimberly Ferguson APRN   metoclopramide (REGLAN) 10 MG tablet TAKE 1 TABLET BY MOUTH 4 (FOUR) TIMES A DAY AS NEEDED (NAUSEA). 11/29/22  Yes Dolly Foss APRN   metoprolol succinate XL (TOPROL-XL) 50 MG 24 hr tablet Take 1 tablet by mouth Daily. Dose increased 5/24/21 12/27/22  Yes Dolly Foss APRN   pantoprazole (PROTONIX) 20 MG EC tablet Take 2 tablets by mouth Daily. 2/20/23  Yes Dolly Foss APRN   ranolazine (RANEXA) 500 MG 12 hr tablet Take 1 tablet by mouth Every 12 (Twelve) Hours. 6/29/22  Yes Bill Gibson MD   sucralfate (Carafate)  "1 g tablet Take 1 tablet by mouth 4 (Four) Times a Day. 1/11/23  Yes Marleny Montoya PA-C   venlafaxine XR (EFFEXOR-XR) 75 MG 24 hr capsule Take 1 capsule by mouth Daily With Breakfast. 3/10/23  Yes Dolly Foss APRN   vitamin D (ERGOCALCIFEROL) 1.25 MG (40716 UT) capsule capsule TAKE 1 CAPSULE BY MOUTH EVERY 7 DAYS. 12/29/22  Yes Dolly Foss APRN   amLODIPine (NORVASC) 5 MG tablet TAKE 1 TABLET BY MOUTH EVERY DAY 5/26/23   Pranav Sutton MD B-EVY ULTRAFINE III SHORT PEN 31G X 8 MM misc USE TO INJECT 5 TIMES A DAY 11/11/22   Elvis Gamboa APRN B-D ULTRAFINE III SHORT PEN 31G X 8 MM misc USE UP TO 4 (FOUR) TIMES DAILY WITH INSULIN AS DIRECTED. 12/27/22   Dolly Foss APRN   BD SHARPS CONTAINER HOME misc 1 each Take As Directed. 9/7/18   Francisca Fong MD   Blood Glucose Monitoring Suppl (ONE TOUCH ULTRA MINI) w/Device kit Patient will need the Accu-Check Avitio meter 10/18/21   Marty, BEBE Luu   dicyclomine (BENTYL) 20 MG tablet Take 1 tablet by mouth Every 6 (Six) Hours. 5/5/23   Provider, MD Esther   glucose blood (Accu-Chek Guide) test strip 1 each by Other route 4 (Four) Times a Day. To test blood sugar 4X daily. For  Dx E11.65 2/28/23   Marty, BEBE Luu   glucose monitor monitoring kit 1 each Daily. accucheck eve meter, E11.9 6/11/20   Elvis Gamboa APRN   Lancets (accu-chek soft touch) lancets As directed 10/18/21   Marty, BEBE Luu   meloxicam (MOBIC) 15 MG tablet TAKE 1 TABLET BY MOUTH EVERY DAY WITH FOOD 12/7/22   Rohan Lazo MD   naloxone (NARCAN) 0.4 MG/ML injection Infuse 0.5 mL into a venous catheter Every 5 (Five) Minutes As Needed for Opioid Reversal. 3/13/21   Nick Faye MD   Needle, Disp, (Easy Touch FlipLock Needles) 25G X 1-1/2\" misc 1 each Every 28 (Twenty-Eight) Days. For administering B12 cyanocobalomin. Dx vitamin B12 deficiency. 5/24/22   Ferguson, BEBE Luu   Needle, Disp, (Hypodermic " "Needle) 20G X 1\" misc 1 each Every 28 (Twenty-Eight) Days. For drawing up B12 for injection monthly, change back to smaller gauge needle prior to injection. Dx Vitamin B12  Deficiency. 5/24/22   Kimberly Ferguson APRN   nitroglycerin (NITROSTAT) 0.4 MG SL tablet Place 1 tablet under the tongue Every 5 (Five) Minutes As Needed for Chest Pain. Take no more than 3 doses in 15 minutes. 4/26/22   Kimberly Ferguson APRN   ondansetron (ZOFRAN) 8 MG tablet Take 1 tablet by mouth Every 8 (Eight) Hours As Needed for Nausea or Vomiting. 3/27/23   Dolly Foss APRN   ondansetron ODT (ZOFRAN-ODT) 4 MG disintegrating tablet Place 1 tablet on the tongue 4 (Four) Times a Day As Needed for Nausea or Vomiting. 2/27/23   Dolly Foss APRN   Syringe 20G X 1-1/2\" 3 ML misc 1 each Every 28 (Twenty-Eight) Days. For injection cyanocobalomin, Dx vit B12 deficiency. 5/24/22   Kimberly Ferguson APRN       amLODIPine, 5 mg, Oral, Daily  ARIPiprazole, 5 mg, Oral, Daily  aspirin EC, 325 mg, Oral, Daily  atorvastatin, 80 mg, Oral, Daily  Bag Balm, 1 application, Topical, BID  clopidogrel, 75 mg, Oral, Daily  cyanocobalamin, 1,000 mcg, Intramuscular, Q28 Days  folic acid, 1,000 mcg, Oral, Daily  furosemide, 40 mg, Oral, Daily  gabapentin, 200 mg, Oral, Q12H  heparin (porcine), 5,000 Units, Subcutaneous, Q8H  hydroCHLOROthiazide, 12.5 mg, Oral, Daily  Insulin Aspart, 0-14 Units, Subcutaneous, TID AC  insulin detemir, 30 Units, Subcutaneous, Daily  insulin detemir, 45 Units, Subcutaneous, Nightly  isosorbide mononitrate, 30 mg, Oral, Daily  levothyroxine, 50 mcg, Oral, QAM  pantoprazole, 40 mg, Oral, Daily  ranolazine, 500 mg, Oral, Q12H  senna-docusate sodium, 2 tablet, Oral, BID  sodium chloride, 10 mL, Intravenous, Q12H  sodium chloride, 10 mL, Intravenous, Q12H  sucralfate, 1 g, Oral, 4x Daily  tamsulosin, 0.4 mg, Oral, Daily  venlafaxine XR, 75 mg, Oral, Daily With Breakfast           Objective    Temp:  [96.2 °F (35.7 °C)-98.2 °F " (36.8 °C)] 98.2 °F (36.8 °C)  Heart Rate:  [75-86] 78  Resp:  [18] 18  BP: (119-135)/(58-76) 131/58    Physical Exam  Constitutional:       Appearance: She is obese.   HENT:      Nose: Nose normal.   Eyes:      Extraocular Movements: Extraocular movements intact.   Cardiovascular:      Rate and Rhythm: Regular rhythm.      Heart sounds: Normal heart sounds.   Pulmonary:      Breath sounds: Normal breath sounds.   Abdominal:      General: Bowel sounds are normal.      Palpations: Abdomen is soft.   Musculoskeletal:         General: Normal range of motion.      Cervical back: Normal range of motion and neck supple.      Comments: Left BKA, old   Skin:     General: Skin is warm.      Comments: Right distal leg with some scaling skin, likely due to her resolving cellulitis   Neurological:      General: No focal deficit present.      Mental Status: She is alert. Mental status is at baseline.   Psychiatric:         Behavior: Behavior normal.           Results Review:  I have reviewed the labs, radiology results, and diagnostic studies.    Laboratory Data:         Invalid input(s): PATRICK CARTAGENA  Estimated Creatinine Clearance: 85.9 mL/min (by C-G formula based on SCr of 0.92 mg/dL).          Results from last 7 days   Lab Units 06/04/23  0540 06/03/23  0623 06/02/23  0533 06/01/23  0603 05/31/23  0538   WBC 10*3/mm3 10.39 9.13 9.62 9.82 9.31   HEMOGLOBIN g/dL 11.8* 11.8* 11.8* 12.3 11.8*   HEMATOCRIT % 36.1 36.8 36.7 38.0 34.7   PLATELETS 10*3/mm3 431 423 422 460* 416           Culture Data:   No results found for: BLOODCX  No results found for: URINECX  No results found for: RESPCX  No results found for: WOUNDCX  No results found for: STOOLCX  No components found for: BODYFLD    Radiology Data:   Imaging Results (Last 24 Hours)       ** No results found for the last 24 hours. **            I have reviewed the patient's current medications.     Assessment/Plan   Right lower leg cellulitis     With US venous doppler  negative for DVT     Was on vanco and zosyn; resolved     Sepsis  (POA)     With fever, leukocytosis and left shifting      Resolved     Physical deconditioning     PT and OT on the case     CM working on placement to ARU    Urinary retention     During her hospitalization: resolved       Chronic medical problems     Essential hypertension     CAD     Type II DM     Bipolar disorder     Hyperlipidemia     Left BKA; uses prosthesis         Medical Decision Making  Number and Complexity of problems: one major complex  Differential Diagnosis: pneumonia    Conditions and Status:        Condition is improving.     MDM Data  External documents reviewed: previous medical records  My EKG interpretation: n/a  My plain film interpretation: no acute event     Discussed with: patient     Treatment Plan  See above    Care Planning  Shared decision making: her  Nikia  Code status and discussions: full code    Disposition  Social Determinants of Health that impact treatment or disposition: none  I expect the patient to be discharged to  rehab unit in 1-2 days    I confirmed that the patient's Advance Care Plan is present, code status is documented, or surrogate decision maker is listed in the patient's medical record.     Nader Higgins MD

## 2023-06-04 NOTE — PLAN OF CARE
Problem: Adult Inpatient Plan of Care  Goal: Plan of Care Review  Outcome: Ongoing, Progressing  Flowsheets  Taken 6/4/2023 2768  Progress: improving  Plan of Care Reviewed With: patient  Taken 6/4/2023 1455  Progress: improving  Plan of Care Reviewed With: patient  Outcome Evaluation: Patient supine in bed. Supine to sit<>sit to supine SBA. Grooming EOB Setup. UB dressing Setup. Discussed Home safety with patient. Pt supine in bed all needs in reach. Pt would continue to benefit from OT services.   Goal Outcome Evaluation:  Plan of Care Reviewed With: patient        Progress: improving  Outcome Evaluation: Patient supine in bed. Supine to sit<>sit to supine SBA. Grooming EOB Setup. UB dressing Setup. Discussed Home safety with patient. Pt supine in bed all needs in reach. Pt would continue to benefit from OT services.

## 2023-06-05 LAB
BASOPHILS # BLD AUTO: 0.1 10*3/MM3 (ref 0–0.2)
BASOPHILS NFR BLD AUTO: 0.8 % (ref 0–1.5)
DEPRECATED RDW RBC AUTO: 42.1 FL (ref 37–54)
EOSINOPHIL # BLD AUTO: 0.23 10*3/MM3 (ref 0–0.4)
EOSINOPHIL NFR BLD AUTO: 1.9 % (ref 0.3–6.2)
ERYTHROCYTE [DISTWIDTH] IN BLOOD BY AUTOMATED COUNT: 13.7 % (ref 12.3–15.4)
GLUCOSE BLDC GLUCOMTR-MCNC: 335 MG/DL (ref 70–130)
HCT VFR BLD AUTO: 34.7 % (ref 34–46.6)
HGB BLD-MCNC: 11.7 G/DL (ref 12–15.9)
IMM GRANULOCYTES # BLD AUTO: 0.03 10*3/MM3 (ref 0–0.05)
IMM GRANULOCYTES NFR BLD AUTO: 0.2 % (ref 0–0.5)
LYMPHOCYTES # BLD AUTO: 1.75 10*3/MM3 (ref 0.7–3.1)
LYMPHOCYTES NFR BLD AUTO: 14.4 % (ref 19.6–45.3)
MCH RBC QN AUTO: 28.5 PG (ref 26.6–33)
MCHC RBC AUTO-ENTMCNC: 33.7 G/DL (ref 31.5–35.7)
MCV RBC AUTO: 84.4 FL (ref 79–97)
MONOCYTES # BLD AUTO: 0.85 10*3/MM3 (ref 0.1–0.9)
MONOCYTES NFR BLD AUTO: 7 % (ref 5–12)
NEUTROPHILS NFR BLD AUTO: 75.7 % (ref 42.7–76)
NEUTROPHILS NFR BLD AUTO: 9.21 10*3/MM3 (ref 1.7–7)
NRBC BLD AUTO-RTO: 0 /100 WBC (ref 0–0.2)
PLATELET # BLD AUTO: 396 10*3/MM3 (ref 140–450)
PMV BLD AUTO: 9.5 FL (ref 6–12)
RBC # BLD AUTO: 4.11 10*6/MM3 (ref 3.77–5.28)
WBC NRBC COR # BLD: 12.17 10*3/MM3 (ref 3.4–10.8)

## 2023-06-05 PROCEDURE — 97110 THERAPEUTIC EXERCISES: CPT

## 2023-06-05 PROCEDURE — 85025 COMPLETE CBC W/AUTO DIFF WBC: CPT | Performed by: HOSPITALIST

## 2023-06-05 PROCEDURE — 82948 REAGENT STRIP/BLOOD GLUCOSE: CPT

## 2023-06-05 PROCEDURE — 25010000002 ONDANSETRON PER 1 MG: Performed by: UROLOGY

## 2023-06-05 PROCEDURE — 63710000001 INSULIN DETEMIR PER 5 UNITS: Performed by: UROLOGY

## 2023-06-05 PROCEDURE — 63710000001 INSULIN ASPART PER 5 UNITS: Performed by: UROLOGY

## 2023-06-05 PROCEDURE — 25010000002 HEPARIN (PORCINE) PER 1000 UNITS: Performed by: UROLOGY

## 2023-06-05 PROCEDURE — 97530 THERAPEUTIC ACTIVITIES: CPT

## 2023-06-05 PROCEDURE — 63710000001 INSULIN DETEMIR PER 5 UNITS: Performed by: HOSPITALIST

## 2023-06-05 RX ORDER — HYDROCODONE BITARTRATE AND ACETAMINOPHEN 5; 325 MG/1; MG/1
1 TABLET ORAL EVERY 6 HOURS PRN
Status: DISCONTINUED | OUTPATIENT
Start: 2023-06-05 | End: 2023-06-07

## 2023-06-05 RX ADMIN — TAMSULOSIN HYDROCHLORIDE 0.4 MG: 0.4 CAPSULE ORAL at 09:56

## 2023-06-05 RX ADMIN — HEPARIN SODIUM 5000 UNITS: 5000 INJECTION INTRAVENOUS; SUBCUTANEOUS at 20:44

## 2023-06-05 RX ADMIN — LORAZEPAM 0.5 MG: 0.5 TABLET ORAL at 09:56

## 2023-06-05 RX ADMIN — ISOSORBIDE MONONITRATE 30 MG: 30 TABLET, EXTENDED RELEASE ORAL at 09:55

## 2023-06-05 RX ADMIN — SUCRALFATE 1 G: 1 TABLET ORAL at 17:16

## 2023-06-05 RX ADMIN — Medication 10 ML: at 20:44

## 2023-06-05 RX ADMIN — RANOLAZINE 500 MG: 500 TABLET, FILM COATED, EXTENDED RELEASE ORAL at 20:43

## 2023-06-05 RX ADMIN — HYDROCODONE BITARTRATE AND ACETAMINOPHEN 1 TABLET: 10; 325 TABLET ORAL at 04:51

## 2023-06-05 RX ADMIN — HEPARIN SODIUM 5000 UNITS: 5000 INJECTION INTRAVENOUS; SUBCUTANEOUS at 14:53

## 2023-06-05 RX ADMIN — RANOLAZINE 500 MG: 500 TABLET, FILM COATED, EXTENDED RELEASE ORAL at 09:56

## 2023-06-05 RX ADMIN — FUROSEMIDE 40 MG: 40 TABLET ORAL at 09:56

## 2023-06-05 RX ADMIN — SUCRALFATE 1 G: 1 TABLET ORAL at 20:44

## 2023-06-05 RX ADMIN — INSULIN DETEMIR 30 UNITS: 100 INJECTION, SOLUTION SUBCUTANEOUS at 09:59

## 2023-06-05 RX ADMIN — VENLAFAXINE HYDROCHLORIDE 75 MG: 75 CAPSULE, EXTENDED RELEASE ORAL at 09:56

## 2023-06-05 RX ADMIN — INSULIN DETEMIR 45 UNITS: 100 INJECTION, SOLUTION SUBCUTANEOUS at 20:46

## 2023-06-05 RX ADMIN — ASPIRIN 325 MG: 325 TABLET, COATED ORAL at 09:56

## 2023-06-05 RX ADMIN — AMLODIPINE BESYLATE 5 MG: 5 TABLET ORAL at 09:55

## 2023-06-05 RX ADMIN — GABAPENTIN 200 MG: 100 CAPSULE ORAL at 09:55

## 2023-06-05 RX ADMIN — GABAPENTIN 200 MG: 100 CAPSULE ORAL at 20:43

## 2023-06-05 RX ADMIN — Medication 1 APPLICATION: at 20:44

## 2023-06-05 RX ADMIN — SUCRALFATE 1 G: 1 TABLET ORAL at 12:45

## 2023-06-05 RX ADMIN — FOLIC ACID 1000 MCG: 1 TABLET ORAL at 09:56

## 2023-06-05 RX ADMIN — Medication 1 APPLICATION: at 09:58

## 2023-06-05 RX ADMIN — LEVOTHYROXINE SODIUM 50 MCG: 50 TABLET ORAL at 05:28

## 2023-06-05 RX ADMIN — HYDROCHLOROTHIAZIDE 12.5 MG: 12.5 TABLET ORAL at 09:56

## 2023-06-05 RX ADMIN — CLOPIDOGREL BISULFATE 75 MG: 75 TABLET ORAL at 09:56

## 2023-06-05 RX ADMIN — Medication 10 ML: at 20:45

## 2023-06-05 RX ADMIN — ARIPIPRAZOLE 5 MG: 5 TABLET ORAL at 09:58

## 2023-06-05 RX ADMIN — INSULIN ASPART 10 UNITS: 100 INJECTION, SOLUTION INTRAVENOUS; SUBCUTANEOUS at 08:00

## 2023-06-05 RX ADMIN — ONDANSETRON 4 MG: 2 INJECTION INTRAMUSCULAR; INTRAVENOUS at 09:56

## 2023-06-05 RX ADMIN — INSULIN ASPART 8 UNITS: 100 INJECTION, SOLUTION INTRAVENOUS; SUBCUTANEOUS at 17:16

## 2023-06-05 RX ADMIN — HEPARIN SODIUM 5000 UNITS: 5000 INJECTION INTRAVENOUS; SUBCUTANEOUS at 05:28

## 2023-06-05 RX ADMIN — ATORVASTATIN CALCIUM 80 MG: 40 TABLET, FILM COATED ORAL at 09:56

## 2023-06-05 RX ADMIN — INSULIN ASPART 14 UNITS: 100 INJECTION, SOLUTION INTRAVENOUS; SUBCUTANEOUS at 11:52

## 2023-06-05 RX ADMIN — SUCRALFATE 1 G: 1 TABLET ORAL at 09:56

## 2023-06-05 RX ADMIN — PANTOPRAZOLE SODIUM 40 MG: 40 TABLET, DELAYED RELEASE ORAL at 09:55

## 2023-06-05 NOTE — PAYOR COMM NOTE
"Linda Barnard RN [facility]  [phone]    phone  [phone]     fax  Cont. Stay Review      Ariana Bailey (63 y.o. Female)       Date of Birth   1960    Social Security Number       Address   [address] [facility] [address]    Home Phone   [phone]    MRN   [id number]       Jew   Restorationist    Marital Status                               Admission Date   5/17/23    Admission Type   Emergency    Admitting Provider       Attending Provider   Santy Rodriguez MD    Department, Room/Bed   [facility] [address] [place], 411/1       Discharge Date       Discharge Disposition       Discharge Destination                                 Attending Provider: Santy Rodriguez MD    Allergies: Seroquel [Quetiapine], Avandia [Rosiglitazone], Morphine And Related, Oxycodone    Isolation: None   Infection: None   Code Status: CPR    Ht: 172.7 cm (68\")   Wt: 116 kg (254 lb 14.4 oz)    Admission Cmt: None   Principal Problem: Sepsis without acute organ dysfunction, due to unspecified organism [A41.9]                   Active Insurance as of 5/17/2023       Primary Coverage       Payor Plan Insurance Group Employer/Plan Group    [facility] BLUE [employer] EMPLOYEE [id number]       Payor Plan Address Payor Plan Phone Number Payor Plan Fax Number Effective Dates    [address] [phone]  1/1/2022 - None Entered    [place] [id number]         Subscriber Name Subscriber Birth Date Member ID       ARIANA BAILEY 1960 [id number]                     Emergency Contacts        (Rel.) Home Phone Work Phone Mobile Phone    Nadeem Bailey (Spouse) [phone] -- [phone]    Елена David (Sister) [phone] -- [phone]              Vital Signs (last day)       Date/Time Temp Temp src Pulse Resp BP Patient Position SpO2    06/05/23 0837 97.5 (36.4) Temporal 85 20 106/52 Lying 99    " 06/05/23 0749 -- -- 84 -- -- -- --    06/05/23 0312 96.9 (36.1) -- 86 18 105/54 -- 95    06/05/23 0125 -- -- 82 -- -- -- --    06/04/23 2022 96.4 (35.8) -- 85 18 142/62 -- 94    06/04/23 1604 -- -- 76 -- -- -- --    06/04/23 1454 98.2 (36.8) -- 78 18 131/58 Lying 92    06/04/23 1123 98 (36.7) -- 78 18 129/76 Lying 94    06/04/23 0725 -- -- 79 -- -- -- --    06/04/23 0333 96.2 (35.7) Temporal 80 18 135/63 -- 96    06/04/23 0119 -- -- 86 -- -- -- --          Oxygen Therapy (last day)       Date/Time SpO2 Device (Oxygen Therapy) Flow (L/min) Oxygen Concentration (%) ETCO2 (mmHg)    06/05/23 0837 99 room air 1 -- --    06/05/23 0312 95 -- 2 -- --    06/04/23 2022 94 room air -- -- --    06/04/23 1454 92 room air -- -- --    06/04/23 1123 94 room air -- -- --    06/04/23 0333 96 nasal cannula 2 -- --          Current Facility-Administered Medications   Medication Dose Route Frequency Provider Last Rate Last Admin    acetaminophen (TYLENOL) tablet 650 mg  650 mg Oral Q4H PRN Rohan Doe MD   650 mg at 05/24/23 2026    aluminum-magnesium hydroxide-simethicone (MAALOX MAX) 400-400-40 MG/5ML suspension 15 mL  15 mL Oral Q6H PRN Rohan Doe MD        amLODIPine (NORVASC) tablet 5 mg  5 mg Oral Daily Reji Mendoza DO   5 mg at 06/05/23 0955    ARIPiprazole (ABILIFY) tablet 5 mg  5 mg Oral Daily Rohan Doe MD   5 mg at 06/05/23 0958    aspirin EC tablet 325 mg  325 mg Oral Daily Rohan Doe MD   325 mg at 06/05/23 0956    atorvastatin (LIPITOR) tablet 80 mg  80 mg Oral Daily Rohan Doe MD   80 mg at 06/05/23 0956    Bag Balm ointment ointment 1 application  1 application Topical BID Rohan Doe MD   1 application at 06/05/23 0958    sennosides-docusate (PERICOLACE) 8.6-50 MG per tablet 2 tablet  2 tablet Oral BID Rohan Doe MD   2 tablet at 06/03/23 2104    And    polyethylene glycol (MIRALAX) packet 17 g  17 g Oral Daily PRN Rohan Doe MD   17 g at 05/24/23 0900    And     bisacodyl (DULCOLAX) EC tablet 5 mg  5 mg Oral Daily PRN Rohan Doe MD   5 mg at 05/24/23 2026    And    bisacodyl (DULCOLAX) suppository 10 mg  10 mg Rectal Daily PRN Rohan Doe MD        clopidogrel (PLAVIX) tablet 75 mg  75 mg Oral Daily Rohan Doe MD   75 mg at 06/05/23 0956    cyanocobalamin injection 1,000 mcg  1,000 mcg Intramuscular Q28 Days Mahin Esteves MD   1,000 mcg at 05/30/23 1727    dextrose (D50W) (25 g/50 mL) IV injection 25 g  25 g Intravenous Q15 Min PRN Rohan Doe MD        dextrose (GLUTOSE) oral gel 15 g  15 g Oral Q15 Min PRN Rohan Doe MD        folic acid (FOLVITE) tablet 1,000 mcg  1,000 mcg Oral Daily Rohan Doe MD   1,000 mcg at 06/05/23 0956    furosemide (LASIX) tablet 40 mg  40 mg Oral Daily Rohan Doe MD   40 mg at 06/05/23 0956    gabapentin (NEURONTIN) capsule 200 mg  200 mg Oral Q12H Santy Rodriguez MD   200 mg at 06/05/23 0955    glucagon (human recombinant) (GLUCAGEN DIAGNOSTIC) injection 1 mg  1 mg Intramuscular Q15 Min PRN Rohan Doe MD        guaiFENesin-dextromethorphan (ROBITUSSIN DM) 100-10 MG/5ML syrup 5 mL  5 mL Oral Q4H PRN Rohan Doe MD   5 mL at 05/29/23 0838    heparin (porcine) 5000 UNIT/ML injection 5,000 Units  5,000 Units Subcutaneous Q8H Rohan Doe MD   5,000 Units at 06/05/23 0528    hydroCHLOROthiazide (HYDRODIURIL) tablet 12.5 mg  12.5 mg Oral Daily Rohan Doe MD   12.5 mg at 06/05/23 0956    Insulin Aspart (novoLOG) injection 0-14 Units  0-14 Units Subcutaneous TID AC Rohan Doe MD   14 Units at 06/05/23 1152    insulin detemir (LEVEMIR) injection 30 Units  30 Units Subcutaneous Daily Mahin Esteves MD   30 Units at 06/05/23 0959    insulin detemir (LEVEMIR) injection 45 Units  45 Units Subcutaneous Nightly BlancoRohan MD   45 Units at 06/04/23 2028    isosorbide mononitrate (IMDUR) 24 hr tablet 30 mg  30 mg Oral Daily BlancoRohan MD    30 mg at 06/05/23 0955    levothyroxine (SYNTHROID, LEVOTHROID) tablet 50 mcg  50 mcg Oral QAM Rohan Doe MD   50 mcg at 06/05/23 0528    LORazepam (ATIVAN) tablet 0.5 mg  0.5 mg Oral Q8H PRN Reji Mendoza DO   0.5 mg at 06/05/23 0956    Magnesium Standard Dose Replacement - Follow Nurse / BPA Driven Protocol   Does not apply PRN Rohan Doe MD        metoclopramide (REGLAN) tablet 10 mg  10 mg Oral 4x Daily PRN Rohan Doe MD   10 mg at 06/03/23 0601    naloxone (NARCAN) injection 0.4 mg  0.4 mg Intravenous Q5 Min PRN Rohan Doe MD        ondansetron (ZOFRAN) injection 4 mg  4 mg Intravenous Q6H PRN Rohan Doe MD   4 mg at 06/05/23 0956    ondansetron (ZOFRAN) tablet 4 mg  4 mg Oral Q6H PRN Rohan Doe MD   4 mg at 06/03/23 1633    pantoprazole (PROTONIX) EC tablet 40 mg  40 mg Oral Daily Rohan oDe MD   40 mg at 06/05/23 0955    ranolazine (RANEXA) 12 hr tablet 500 mg  500 mg Oral Q12H Rohan Doe MD   500 mg at 06/05/23 0956    sodium chloride 0.9 % flush 10 mL  10 mL Intravenous PRN Rohan Doe MD        sodium chloride 0.9 % flush 10 mL  10 mL Intravenous Q12H Rohan Doe MD   10 mL at 06/04/23 2025    sodium chloride 0.9 % flush 10 mL  10 mL Intravenous PRN Rohan Doe MD        sodium chloride 0.9 % flush 10 mL  10 mL Intravenous Q12H Rohan Doe MD   10 mL at 06/04/23 2025    sodium chloride 0.9 % flush 10 mL  10 mL Intravenous PRN Rohan Doe MD        sodium chloride 0.9 % infusion 40 mL  40 mL Intravenous PRN Rohan Doe MD        sodium chloride 0.9 % infusion 40 mL  40 mL Intravenous PRN Rohan Doe MD   40 mL at 05/22/23 1327    sucralfate (CARAFATE) tablet 1 g  1 g Oral 4x Daily Rohan Doe MD   1 g at 06/05/23 1245    tamsulosin (FLOMAX) 24 hr capsule 0.4 mg  0.4 mg Oral Daily Rohan Doe MD   0.4 mg at 06/05/23 0956    venlafaxine XR (EFFEXOR-XR) 24 hr capsule 75 mg  75 mg Oral Daily With  Breakfast [place], Rohan VERMA MD   75 mg at 06/05/23 0956        Physician Progress Notes (last 48 hours)        Nader Higgins MD at 06/04/23 1557              [facility] Medicine Services  INPATIENT PROGRESS NOTE    Length of Stay: 18  Date of Admission: 5/17/2023  Primary Care Physician: Dolly Foss APRN    Subjective   (S) Admitted for cough, fever, chills and diagnosed with Right lower extremity cellulitis  Today, she is lying down and no complains reported    Review of Systems   All other systems reviewed and are negative.   All pertinent negatives and positives are as above. All other systems have been reviewed and are negative unless otherwise stated.     Prior to Admission medications    Medication Sig Start Date End Date Taking? Authorizing Provider   ARIPiprazole (ABILIFY) 5 MG tablet Take 1 tablet by mouth Daily. 3/10/23  Yes Dolly Foss APRN   aspirin  MG tablet Take 1 tablet by mouth Daily. 2/8/22  Yes Mahin Esteves MD   atorvastatin (LIPITOR) 80 MG tablet Take 1 tablet by mouth Daily. 9/7/22  Yes Dolly Foss APRN   butalbital-acetaminophen-caffeine (FIORICET, ESGIC) -40 MG per tablet Take one to two tablets by mouth per headache episode as needed. 4/21/23  Yes Dolly Foss APRN   Calcium Citrate-Vitamin D 250-200 MG-UNIT tablet Take 2 tablets by mouth 2 (two) times a day.   Yes ProviderEsther MD   clopidogrel (PLAVIX) 75 MG tablet Take 1 tablet by mouth Daily. 3/28/23  Yes Dolly Foss APRN   cyanocobalamin 1000 MCG/ML injection INJECT 1 ML INTO THE APPROPRIATE MUSCLE AS DIRECTED BY PRESCRIBER EVERY 28 (TWENTY-EIGHT) DAYS. 4/7/23  Yes Pranav Sutton MD   famotidine (PEPCID) 40 MG tablet Take 1 tablet by mouth Daily. 4/14/23  Yes ProviderEsther MD   fluticasone (FLONASE) 50 MCG/ACT nasal spray INSTILL 2 SPRAYS IN EACH NOSTRIL AS DIRECTED BY PROVIDER DAILY  Patient taking differently: As Needed. 2/1/23  Yes  Dolly Foss APRN   folic acid (FOLVITE) 1 MG tablet TAKE 1 TABLET BY MOUTH EVERY DAY 4/18/23  Yes Teressa Hammond APRN   furosemide (LASIX) 40 MG tablet TAKE 1 TABLET BY MOUTH EVERY DAY 2/10/23  Yes Dolly Foss APRN   gabapentin (NEURONTIN) 100 MG capsule TAKE 1 CAPSULE BY MOUTH EVERY 12 HOURS 4/17/23  Yes Dolly Foss APRN   hydroCHLOROthiazide (HYDRODIURIL) 12.5 MG tablet TAKE 1 TABLET BY MOUTH EVERY DAY 3/15/23  Yes Dolly Foss APRN   HYDROcodone-acetaminophen (NORCO) 7.5-325 MG per tablet Take 1 tablet by mouth Every 6 (Six) Hours As Needed for Moderate Pain. Pls fill on 5/10 5/5/23  Yes Dolly Foss APRN   insulin aspart (NovoLOG FlexPen) 100 UNIT/ML solution pen-injector sc pen INJECT 30 UNITS UNDER SKIN AS DIRECTED 3 TIMES A DAY WITH MEALS 3/9/23  Yes Elvis Gamboa APRN   Insulin Glargine (BASAGLAR KWIKPEN) 100 UNIT/ML injection pen Inject 40 units into the appropriate area every morning and 35 units into the appropriate area every night 10/24/22  Yes Elvis Gamboa APRN   isosorbide mononitrate (IMDUR) 30 MG 24 hr tablet TAKE 1 TABLET BY MOUTH EVERY DAY 4/11/23  Yes Dolly Foss APRN   levothyroxine (Synthroid) 50 MCG tablet Take 1 tablet by mouth Daily. 4/26/23 4/25/24 Yes Elvis Gamboa APRN   meclizine (ANTIVERT) 25 MG tablet Take 1 tablet by mouth 3 (Three) Times a Day As Needed for dizziness. 12/14/17  Yes Evon Hernandez APRN   methocarbamol (ROBAXIN) 500 MG tablet TAKE 1 TABLET BY MOUTH 2 TIMES A DAY AS NEEDED FOR MUSCLE SPASMS. 6/7/22  Yes Kimberly Ferguson APRN   metoclopramide (REGLAN) 10 MG tablet TAKE 1 TABLET BY MOUTH 4 (FOUR) TIMES A DAY AS NEEDED (NAUSEA). 11/29/22  Yes Dolly Foss APRN   metoprolol succinate XL (TOPROL-XL) 50 MG 24 hr tablet Take 1 tablet by mouth Daily. Dose increased 5/24/21 12/27/22  Yes Dolly Foss APRN   pantoprazole (PROTONIX) 20 MG EC tablet Take 2 tablets by mouth Daily. 2/20/23  Yes Dolly Foss  "BEBE Hunter   ranolazine (RANEXA) 500 MG 12 hr tablet Take 1 tablet by mouth Every 12 (Twelve) Hours. 6/29/22  Yes Bill Gibson MD   sucralfate (Carafate) 1 g tablet Take 1 tablet by mouth 4 (Four) Times a Day. 1/11/23  Yes Marleny Montoya PA-C   venlafaxine XR (EFFEXOR-XR) 75 MG 24 hr capsule Take 1 capsule by mouth Daily With Breakfast. 3/10/23  Yes Dolly Foss APRN   vitamin D (ERGOCALCIFEROL) 1.25 MG (06491 UT) capsule capsule TAKE 1 CAPSULE BY MOUTH EVERY 7 DAYS. 12/29/22  Yes Dolly Foss APRN   amLODIPine (NORVASC) 5 MG tablet TAKE 1 TABLET BY MOUTH EVERY DAY 5/26/23   Pranav Sutton MD B-D ULTRAFINE III SHORT PEN 31G X 8 MM misc USE TO INJECT 5 TIMES A DAY 11/11/22   Elvis Gamboa APRN B-D ULTRAFINE III SHORT PEN 31G X 8 MM misc USE UP TO 4 (FOUR) TIMES DAILY WITH INSULIN AS DIRECTED. 12/27/22   Dolly Foss APRN   BD SHARPS CONTAINER HOME misc 1 each Take As Directed. 9/7/18   Francisca Fong MD   Blood Glucose Monitoring Suppl (ONE TOUCH ULTRA MINI) w/Device kit Patient will need the Accu-Check Avitio meter 10/18/21   Marty, BEBE Luu   dicyclomine (BENTYL) 20 MG tablet Take 1 tablet by mouth Every 6 (Six) Hours. 5/5/23   Provider, MD Esther   glucose blood (Accu-Chek Guide) test strip 1 each by Other route 4 (Four) Times a Day. To test blood sugar 4X daily. For  Dx E11.65 2/28/23   Kimberly Ferguson APRN   glucose monitor monitoring kit 1 each Daily. accucheck eve meter, E11.9 6/11/20   Elvis Gamboa APRN   Lancets (accu-chek soft touch) lancets As directed 10/18/21   Kimberly Ferguson APRN   meloxicam (MOBIC) 15 MG tablet TAKE 1 TABLET BY MOUTH EVERY DAY WITH FOOD 12/7/22   Rohan Lazo MD   naloxone (NARCAN) 0.4 MG/ML injection Infuse 0.5 mL into a venous catheter Every 5 (Five) Minutes As Needed for Opioid Reversal. 3/13/21   Nick Faye MD   Needle, Disp, (Easy Touch FlipLock Needles) 25G X 1-1/2\" misc 1 " "each Every 28 (Twenty-Eight) Days. For administering B12 cyanocobalomin. Dx vitamin B12 deficiency. 5/24/22   Kimberly Ferguson APRN   Needle, Disp, (Hypodermic Needle) 20G X 1\" misc 1 each Every 28 (Twenty-Eight) Days. For drawing up B12 for injection monthly, change back to smaller gauge needle prior to injection. Dx Vitamin B12  Deficiency. 5/24/22   Kimberly Ferguson APRN   nitroglycerin (NITROSTAT) 0.4 MG SL tablet Place 1 tablet under the tongue Every 5 (Five) Minutes As Needed for Chest Pain. Take no more than 3 doses in 15 minutes. 4/26/22   Kimberly Ferguson APRN   ondansetron (ZOFRAN) 8 MG tablet Take 1 tablet by mouth Every 8 (Eight) Hours As Needed for Nausea or Vomiting. 3/27/23   Dolly Foss APRN   ondansetron ODT (ZOFRAN-ODT) 4 MG disintegrating tablet Place 1 tablet on the tongue 4 (Four) Times a Day As Needed for Nausea or Vomiting. 2/27/23   Dolly Foss APRN   Syringe 20G X 1-1/2\" 3 ML misc 1 each Every 28 (Twenty-Eight) Days. For injection cyanocobalomin, Dx vit B12 deficiency. 5/24/22   Kimberly Ferguson APRN       amLODIPine, 5 mg, Oral, Daily  ARIPiprazole, 5 mg, Oral, Daily  aspirin EC, 325 mg, Oral, Daily  atorvastatin, 80 mg, Oral, Daily  Bag Balm, 1 application, Topical, BID  clopidogrel, 75 mg, Oral, Daily  cyanocobalamin, 1,000 mcg, Intramuscular, Q28 Days  folic acid, 1,000 mcg, Oral, Daily  furosemide, 40 mg, Oral, Daily  gabapentin, 200 mg, Oral, Q12H  heparin (porcine), 5,000 Units, Subcutaneous, Q8H  hydroCHLOROthiazide, 12.5 mg, Oral, Daily  Insulin Aspart, 0-14 Units, Subcutaneous, TID AC  insulin detemir, 30 Units, Subcutaneous, Daily  insulin detemir, 45 Units, Subcutaneous, Nightly  isosorbide mononitrate, 30 mg, Oral, Daily  levothyroxine, 50 mcg, Oral, QAM  pantoprazole, 40 mg, Oral, Daily  ranolazine, 500 mg, Oral, Q12H  senna-docusate sodium, 2 tablet, Oral, BID  sodium chloride, 10 mL, Intravenous, Q12H  sodium chloride, 10 mL, Intravenous, " Q12H  sucralfate, 1 g, Oral, 4x Daily  tamsulosin, 0.4 mg, Oral, Daily  venlafaxine XR, 75 mg, Oral, Daily With Breakfast           Objective    Temp:  [96.2 °F (35.7 °C)-98.2 °F (36.8 °C)] 98.2 °F (36.8 °C)  Heart Rate:  [75-86] 78  Resp:  [18] 18  BP: (119-135)/(58-76) 131/58    Physical Exam  Constitutional:       Appearance: She is obese.   HENT:      Nose: Nose normal.   Eyes:      Extraocular Movements: Extraocular movements intact.   Cardiovascular:      Rate and Rhythm: Regular rhythm.      Heart sounds: Normal heart sounds.   Pulmonary:      Breath sounds: Normal breath sounds.   Abdominal:      General: Bowel sounds are normal.      Palpations: Abdomen is soft.   Musculoskeletal:         General: Normal range of motion.      Cervical back: Normal range of motion and neck supple.      Comments: Left BKA, old   Skin:     General: Skin is warm.      Comments: Right distal leg with some scaling skin, likely due to her resolving cellulitis   Neurological:      General: No focal deficit present.      Mental Status: She is alert. Mental status is at baseline.   Psychiatric:         Behavior: Behavior normal.           Results Review:  I have reviewed the labs, radiology results, and diagnostic studies.    Laboratory Data:         Invalid input(s): PATRICK CARTAGENA  Estimated Creatinine Clearance: 85.9 mL/min (by C-G formula based on SCr of 0.92 mg/dL).          Results from last 7 days   Lab Units 06/04/23  0540 06/03/23  0623 06/02/23  0533 06/01/23  0603 05/31/23  0538   WBC 10*3/mm3 10.39 9.13 9.62 9.82 9.31   HEMOGLOBIN g/dL 11.8* 11.8* 11.8* 12.3 11.8*   HEMATOCRIT % 36.1 36.8 36.7 38.0 34.7   PLATELETS 10*3/mm3 431 423 422 460* 416           Culture Data:   No results found for: BLOODCX  No results found for: URINECX  No results found for: RESPCX  No results found for: WOUNDCX  No results found for: STOOLCX  No components found for: BODYFLD    Radiology Data:   Imaging Results (Last 24 Hours)       ** No  results found for the last 24 hours. **            I have reviewed the patient's current medications.     Assessment/Plan   Right lower leg cellulitis     With US venous doppler negative for DVT     Was on vanco and zosyn; resolved     Sepsis  (POA)     With fever, leukocytosis and left shifting      Resolved     Physical deconditioning     PT and OT on the case     CM working on placement to ARU    Urinary retention     During her hospitalization: resolved       Chronic medical problems     Essential hypertension     CAD     Type II DM     Bipolar disorder     Hyperlipidemia     Left BKA; uses prosthesis         Medical Decision Making  Number and Complexity of problems: one major complex  Differential Diagnosis: pneumonia    Conditions and Status:        Condition is improving.     MDM Data  External documents reviewed: previous medical records  My EKG interpretation: n/a  My plain film interpretation: no acute event     Discussed with: patient     Treatment Plan  See above    Care Planning  Shared decision making: her  Nikia  Code status and discussions: full code    Disposition  Social Determinants of Health that impact treatment or disposition: none  I expect the patient to be discharged to  rehab unit in 1-2 days    I confirmed that the patient's Advance Care Plan is present, code status is documented, or surrogate decision maker is listed in the patient's medical record.     Nader Higgins MD      Electronically signed by Nader Higgins MD at 06/04/23 1617       Reji Mendoza DO at 06/03/23 1823              [facility] Medicine Services  INPATIENT PROGRESS NOTE      Length of Stay: 17  Date of Admission: 5/17/2023  Primary Care Physician: Dolly Foss APRN    Subjective   Chief Complaint: Nausea with eating  HPI:    62yo female pmh significant htn/hyperlipidemia/dm2/hypothyroid/cad/HFpEF/obesity presents ED via EMS c/o 1d hx  fever/chills/generalized weakness.  ROS (+) nonproductive cough.  Denies sore throat/rhinorrhea/chest pain/soa/abd pain/n/v/d/dysuria.    Daily assessment 6/2/2023  Remains stable on exam.  Denies any new problems or complaints.  She does state that she is feeling better daily.  She does remain tired fatigue.  Remains weak.  No fevers or chills.  No nausea or vomiting.  Vital signs are stable patient is afebrile.  Is agreeable to go to ARU versus skilled nursing facility on discharge    Review of Systems   Constitutional:  Positive for fatigue.   Respiratory:  Negative for shortness of breath.    Cardiovascular:  Negative for chest pain.   Neurological:  Positive for weakness.      All pertinent negatives and positives are as above. All other systems have been reviewed and are negative unless otherwise stated.     Objective    As of today 06/03/23  Temp:  [96 °F (35.6 °C)-97.8 °F (36.6 °C)] 97.8 °F (36.6 °C)  Heart Rate:  [77-84] 84  Resp:  [16-18] 16  BP: (110-134)/(54-75) 120/54    Physical Exam  Vitals and nursing note reviewed.   Constitutional:       Appearance: She is well-developed.   HENT:      Head: Normocephalic and atraumatic.      Right Ear: External ear normal.      Left Ear: External ear normal.      Mouth/Throat:      Pharynx: Oropharynx is clear.   Eyes:      Extraocular Movements: Extraocular movements intact.      Conjunctiva/sclera: Conjunctivae normal.      Pupils: Pupils are equal, round, and reactive to light.   Cardiovascular:      Rate and Rhythm: Normal rate and regular rhythm.      Heart sounds: Normal heart sounds. No murmur heard.    No friction rub. No gallop.   Pulmonary:      Effort: Pulmonary effort is normal. No respiratory distress.      Breath sounds: Normal breath sounds. No wheezing or rales.   Chest:      Chest wall: No tenderness.   Abdominal:      General: Bowel sounds are normal. There is no distension.      Palpations: Abdomen is soft.      Tenderness: There is no abdominal  tenderness. There is no guarding.   Musculoskeletal:      Cervical back: Normal range of motion and neck supple.      Comments: Left BKA   Skin:     General: Skin is warm and dry.      Comments: Right lower extremity erythema improving.  Blister on dorsum of right foot.  Ruptured blister on medial aspect of right distal lower extremity.   Neurological:      Motor: Weakness present.   Psychiatric:         Behavior: Behavior normal.         Thought Content: Thought content normal.         amLODIPine, 5 mg, Oral, Daily  ARIPiprazole, 5 mg, Oral, Daily  aspirin EC, 325 mg, Oral, Daily  atorvastatin, 80 mg, Oral, Daily  Bag Balm, 1 application, Topical, BID  clopidogrel, 75 mg, Oral, Daily  cyanocobalamin, 1,000 mcg, Intramuscular, Q28 Days  folic acid, 1,000 mcg, Oral, Daily  furosemide, 40 mg, Oral, Daily  gabapentin, 200 mg, Oral, Q12H  heparin (porcine), 5,000 Units, Subcutaneous, Q8H  hydroCHLOROthiazide, 12.5 mg, Oral, Daily  Insulin Aspart, 0-14 Units, Subcutaneous, TID AC  insulin detemir, 30 Units, Subcutaneous, Daily  insulin detemir, 45 Units, Subcutaneous, Nightly  isosorbide mononitrate, 30 mg, Oral, Daily  levothyroxine, 50 mcg, Oral, QAM  pantoprazole, 40 mg, Oral, Daily  ranolazine, 500 mg, Oral, Q12H  senna-docusate sodium, 2 tablet, Oral, BID  sodium chloride, 10 mL, Intravenous, Q12H  sodium chloride, 10 mL, Intravenous, Q12H  sucralfate, 1 g, Oral, 4x Daily  tamsulosin, 0.4 mg, Oral, Daily  venlafaxine XR, 75 mg, Oral, Daily With Breakfast         Results Review:  I have reviewed the labs, radiology results, and diagnostic studies.    Laboratory Data:   Results from last 7 days   Lab Units 05/28/23  0654   SODIUM mmol/L 136   POTASSIUM mmol/L 4.0   CHLORIDE mmol/L 98   CO2 mmol/L 28.0   BUN mg/dL 12   CREATININE mg/dL 0.92   GLUCOSE mg/dL 295*   CALCIUM mg/dL 9.3   ANION GAP mmol/L 10.0       Estimated Creatinine Clearance: 87.5 mL/min (by C-G formula based on SCr of 0.92 mg/dL).          Results from  last 7 days   Lab Units 06/03/23  0623 06/02/23  0533 06/01/23  0603 05/31/23  0538 05/30/23  0639   WBC 10*3/mm3 9.13 9.62 9.82 9.31 11.11*   HEMOGLOBIN g/dL 11.8* 11.8* 12.3 11.8* 12.1   HEMATOCRIT % 36.8 36.7 38.0 34.7 37.4   PLATELETS 10*3/mm3 423 422 460* 416 520*             Culture Data:   No results found for: BLOODCX  No results found for: URINECX  No results found for: RESPCX  No results found for: WOUNDCX  No results found for: STOOLCX  No components found for: BODYFLD    Radiology Data:   Imaging Results (Last 24 Hours)       ** No results found for the last 24 hours. **            I have reviewed the patient's current medications.     Assessment/Plan     Principal Problem:    Sepsis without acute organ dysfunction, due to unspecified organism  Active Problems:    Urinary retention      1.  Right lower extremity cellulitis:   Improving nicely.    Completed antibiotic course.    Wound care following.  Continue current treatment.  Remained stable no new issues or concerns    2.  Urinary retention: Appreciate urology help.  Urinary retention is resolved     3.  Hypertension:   Current blood pressure is 109/79  O2 saturation 92% room air on   Continue amlodipine, Lasix, hydrochlorothiazide    4.  Coronary artery disease:   Stable continue atorvastatin, Plavix, Imdur, Ranexa    5.  Diabetes mellitus:  Continue Levemir, sliding scale and ADA diet    Monitor.      6.  Deconditioning:   Continue PT/OT.  Patient is doing some better, but still feels that she needs rehab prior to home.     DVT prophylaxis: Heparin.  CODE STATUS full code    Remains essentially unchanged.  We will continue to await case management placement to ARU or skilled nursing facility.    Medical Decision Making  Number and Complexity of problems: Several highly complex medical problems     Conditions and Status:        Condition is improving.        Discussed with: Patient and      Treatment Plan  As above     Care Planning  Shared  decision making: Patient in agreement with plan of care  Code status and discussions: Full code     Disposition  Social Determinants of Health that impact treatment or disposition: None  I expect the patient to be discharged to home in 1-2 days.         The patient was evaluated during the global COVID-19 pandemic, and the diagnosis was suspected/considered upon their initial presentation.  Evaluation, treatment, and testing were consistent with current guidelines for patients who present with complaints or symptoms that may be related to COVID-19.     I confirmed that the patient's Advance Care Plan is present, code status is documented, or surrogate decision maker is listed in the patient's medical record.      Reji Mendoza DO    Electronically signed by Reji Mendoza DO at 06/03/23 0532

## 2023-06-05 NOTE — THERAPY TREATMENT NOTE
Acute Care - Physical Therapy Treatment Note  [facility]     Patient Name: Ariana Martinez  : 1960  MRN: [id number]  Today's Date: 2023      Visit Dx:     ICD-10-CM ICD-9-CM   1. Sepsis without acute organ dysfunction, due to unspecified organism  A41.9 038.9     995.91   2. Cellulitis of right lower extremity  L03.115 682.6   3. Type 2 diabetes mellitus with hyperglycemia, unspecified whether long term insulin use  E11.65 250.00   4. Impaired mobility and activities of daily living  Z74.09 V49.89    Z78.9    5. Impaired physical mobility  Z74.09 781.99   6. Impaired mobility and ADLs  Z74.09 V49.89    Z78.9    7. Urinary retention  R33.9 788.20     Patient Active Problem List   Diagnosis    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, with long-term current use of insulin    Closed nondisplaced fracture of fifth left metatarsal bone    MAURICIO (generalized anxiety disorder)    Depression, major, recurrent, moderate (HCC)    GERD without esophagitis    Long term prescription opiate use    Mixed hyperlipidemia    Vitamin D deficiency    Seasonal allergic rhinitis    Restrictive lung disease secondary to obesity    Adult body mass index 37.0-37.9    Snoring    Class 3 severe obesity due to excess calories without serious comorbidity with body mass index (BMI) of 40.0 to 44.9 in adult (HCC)    (HFpEF) heart failure with preserved ejection fraction    Type 2 diabetes mellitus with hyperglycemia, with long-term current use of insulin (HCC)    Cyanocobalamin deficiency    Coronary artery disease of native artery of native heart with stable angina pectoris    Hypertension    Meniere's disease    Gastroparesis    Pulmonary hypertension (HCC)    Pes cavus    Primary osteoarthritis involving multiple joints    Generalized anxiety disorder    Chronic right-sided low back pain with right-sided sciatica    Chest pain    Adverse effect of iron    Chest pain due to myocardial ischemia     Nonrheumatic tricuspid valve regurgitation    Iron deficiency anemia due to chronic blood loss    Malaise and fatigue    Ankle arthritis    Urinary retention    Endocarditis    Essential hypertension    Occlusion and stenosis of bilateral carotid arteries    S/P BKA (below knee amputation) unilateral, left (HCC)    Phantom pain after amputation of lower extremity    S/P CABG (coronary artery bypass graft)    Severe malnutrition    TIA (transient ischemic attack)    Coronary artery abnormality    Elevated d-dimer    Neuropathy    Chest pain, unspecified type    Acute pain of right shoulder    Rotator cuff syndrome, right    Acquired hypothyroidism    Bilateral leg edema    Left elbow pain    Gastritis    Olecranon bursitis, left elbow    Sepsis without acute organ dysfunction, due to unspecified organism     Past Medical History:   Diagnosis Date    Acute bacterial endocarditis 3/13/2021    Angina, class IV     Anxiety     Anxiety and depression     Arthritis     Benign paroxysmal positional vertigo     Bladder disorder     has bladder stimulator    Carpal tunnel syndrome     CHF (congestive heart failure)     Chronic pain     Coronary atherosclerosis     hx CABG 2005.  is followed by Dr Cher Amaya     Diabetes mellitus     Type 2, controlled    Diabetic polyneuropathy     Disease of thyroid gland     Elevated cholesterol     Female stress incontinence     Foot pain, left     Full dentures     Gastroparesis     GERD (gastroesophageal reflux disease)     Hyperlipidemia     Hypertension     Low back pain     Malaise and fatigue     Multiple joint pain     Obesity     Refuses to be weighed    Occlusion and stenosis of bilateral carotid arteries 6/18/2021    Otalgia     Both    Perforation of tympanic membrane     Left    Postoperative wound infection     Vitamin D deficiency     Wears glasses     reading     Past Surgical History:   Procedure Laterality Date    ABDOMINAL SURGERY      AMPUTATION FOOT       ANGIOPLASTY      coronary    ANKLE ARTHROSCOPY Left 02/26/2021    Procedure: Left foot hardware removal, ankle arthroscopy, bone grafting , left foot exostectomy;  Surgeon: Ignacio Lord DPM;  Location: [facility] OR;  Service: Podiatry;  Laterality: Left;    BREAST BIOPSY Right     CARDIAC CATHETERIZATION      CARDIAC CATHETERIZATION N/A 06/20/2017    Procedure: Right Heart Cath;  Surgeon: Can Kwon MD PhD;  Location: [facility] CATH INVASIVE LOCATION;  Service:     CARDIAC CATHETERIZATION N/A 02/18/2020    Procedure: Left Heart Cath;  Surgeon: Catalina Cooper MD;  Location: [facility] CATH INVASIVE LOCATION;  Service: Cardiology;  Laterality: N/A;    CARDIAC CATHETERIZATION N/A 04/06/2022    Procedure: Left Heart Cath;  Surgeon: Sheryl Navas MD;  Location: [facility] CATH INVASIVE LOCATION;  Service: Cardiology;  Laterality: N/A;    CARPAL TUNNEL RELEASE      CHOLECYSTECTOMY      COLONOSCOPY N/A 06/24/2020    Procedure: COLONOSCOPY;  Surgeon: Julián Maldonado MD;  Location: [facility] ENDOSCOPY;  Service: Gastroenterology;  Laterality: N/A;    CORONARY ARTERY BYPASS GRAFT  2005    ENDOSCOPY N/A 10/19/2018    Procedure: ESOPHAGOGASTRODUODENOSCOPY possible dilation;  Surgeon: Julián Maldonado MD;  Location: [facility] ENDOSCOPY;  Service: Gastroenterology    ENDOSCOPY N/A 06/24/2020    Procedure: ESOPHAGOGASTRODUODENOSCOPY WED appt please;  Surgeon: Julián Maldonado MD;  Location: [facility] ENDOSCOPY;  Service: Gastroenterology;  Laterality: N/A;    ENDOSCOPY N/A 06/10/2022    Procedure: ESOPHAGOGASTRODUODENOSCOPY   room 380;  Surgeon: Jeremiah Wilkins MD;  Location: [facility] ENDOSCOPY;  Service: Gastroenterology;  Laterality: N/A;    ENDOSCOPY N/A 1/10/2023    Procedure: ESOPHAGOGASTRODUODENOSCOPY;  Surgeon: Jeremiah Wilkins MD;  Location: [facility] ENDOSCOPY;  Service: Gastroenterology;  Laterality: N/A;    ENDOSCOPY AND COLONOSCOPY      FOOT SURGERY      Toes    FOOT SURGERY      GASTRIC BANDING      Revision,  laparoscopic    HYSTERECTOMY      INCISION AND DRAINAGE LEG Left 03/12/2021    Procedure: Left ankle arthroscopic irrigation and debridement, screw removal;  Surgeon: Ignacio Lord DPM;  Location: [facility];  Service: Podiatry;  Laterality: Left;    MOUTH SURGERY      SALPINGO OOPHORECTOMY      SHOULDER SURGERY      SUBTALAR ARTHRODESIS Left 01/16/2019    Procedure: LEFT FOOT HARDWARE REMOVAL, FIFTH METATARSAL , OPEN REDUCTION INTERNAL FIXATION, CALCANEAL OSTEOTOMY;  Surgeon: Ignacio Lord DPM;  Location: [facility] OR;  Service: Podiatry    SUBTALAR ARTHRODESIS Left 10/16/2019    Procedure: foot hardware removal, subtalar joint fusion  possible de/reattachment of achilles tendon        (c-arm);  Surgeon: Ignacio Lord DPM;  Location: [facility];  Service: Podiatry    SUBTALAR ARTHRODESIS Left 09/30/2020    Procedure: subtalar, talonavicular joint arthrodesis.  Removal hardware.          (c-arm);  Surgeon: Ignacio Lord DPM;  Location: [facility];  Service: Podiatry;  Laterality: Left;    TRANSESOPHAGEAL ECHOCARDIOGRAM (LAMONTE)      With color flow     PT Assessment (last 12 hours)       PT Evaluation and Treatment       Row Name 06/05/23 0958          Physical Therapy Time and Intention    Document Type therapy note (daily note)  -     Mode of Treatment physical therapy;individual therapy  -     Patient Effort good  -     Comment 36  -       Row Name 06/05/23 0958          General Information    Patient Profile Reviewed yes  -     Existing Precautions/Restrictions fall  -       Row Name 06/05/23 0958          Pain    Pretreatment Pain Rating 4/10  -     Posttreatment Pain Rating 4/10  -     Pain Location - Side/Orientation Right  -     Pain Location lower  -     Pain Location - extremity  -     Pain Intervention(s) Repositioned  -       Row Name 06/05/23 0958          Cognition    Affect/Mental Status (Cognition) WFL  -     Orientation Status (Cognition) oriented x 4  -AME      Follows Commands (Cognition) WFL  -     Personal Safety Interventions gait belt;fall prevention program maintained;muscle strengthening facilitated;nonskid shoes/slippers when out of bed;supervised activity  -       Row Name 06/05/23 0958          Bed Mobility    Bed Mobility supine-sit  -     Scooting/Bridging Central (Bed Mobility) modified independence  -AME     Supine-Sit Central (Bed Mobility) modified independence  -     Sit-Supine Central (Bed Mobility) modified independence  -     Assistive Device (Bed Mobility) bed rails;head of bed elevated  -       Row Name 06/05/23 0958          Transfers    Transfers sit-stand transfer;stand-sit transfer;bed-chair transfer  -       Row Name 06/05/23 0958          Bed-Chair Transfer    Bed-Chair Central (Transfers) contact guard;minimum assist (75% patient effort)  -     Assistive Device (Bed-Chair Transfers) walker, front-wheeled  -       Row Name 06/05/23 0958          Sit-Stand Transfer    Sit-Stand Central (Transfers) contact guard;minimum assist (75% patient effort)  -     Assistive Device (Sit-Stand Transfers) walker, front-wheeled  -       Row Name 06/05/23 0958          Stand-Sit Transfer    Stand-Sit Central (Transfers) contact guard;minimum assist (75% patient effort)  -     Assistive Device (Stand-Sit Transfers) walker, front-wheeled  -       Row Name 06/05/23 0958          Gait/Stairs (Locomotion)    Gait/Stairs Locomotion gait/ambulation independence;gait/ambulation assistive device;gait pattern;gait deviations;distance ambulated;maintains weight-bearing status;stairs negotiation  -     Central Level (Gait) contact guard  -     Assistive Device (Gait) walker, front-wheeled  -     Distance in Feet (Gait) 15  -     Deviations/Abnormal Patterns (Gait) luis decreased;gait speed decreased;stride length decreased  -       Row Name 06/05/23 0958          Motor Skills    Therapeutic Exercise  --  ankle DF/PF(R LE), LAQ, hip flexion, hip Ab/Ad, GS- 30x2 arabella AROM. with prosthesis donned on L LE.  -AME       Row Name             Wound 05/17/23 1828 Right anterior fifth toe    Wound - Properties Group Placement Date: 05/17/23  - Placement Time: 1828  -JH Side: Right  -JH Orientation: anterior  -JH Location: fifth toe  -JH    Retired Wound - Properties Group Placement Date: 05/17/23  - Placement Time: 1828  -JH Side: Right  -JH Orientation: anterior  -JH Location: fifth toe  -JH    Retired Wound - Properties Group Date first assessed: 05/17/23  - Time first assessed: 1828  -JH Side: Right  -JH Location: fifth toe  -JH      Row Name             Wound 05/23/23 1006 Right distal leg    Wound - Properties Group Placement Date: 05/23/23  - Placement Time: 1006  -LH Present on Hospital Admission: N  -LH Side: Right  -LH Orientation: distal  -LH Location: leg  -LH    Retired Wound - Properties Group Placement Date: 05/23/23  - Placement Time: 1006  -LH Present on Hospital Admission: N  -LH Side: Right  -LH Orientation: distal  -LH Location: leg  -LH    Retired Wound - Properties Group Date first assessed: 05/23/23  - Time first assessed: 1006  -LH Present on Hospital Admission: N  -LH Side: Right  -LH Location: leg  -LH      Row Name 06/05/23 0958          Vital Signs    Pre Systolic BP Rehab 122  -AME     Pre Treatment Diastolic BP 43  -AME     Post Systolic BP Rehab 121  -AME     Post Treatment Diastolic BP 53  -AME     Pretreatment Heart Rate (beats/min) 83  -AME     Posttreatment Heart Rate (beats/min) 84  -AME     Pre SpO2 (%) 91  -AME     O2 Delivery Pre Treatment room air  -AME     Post SpO2 (%) 97  -AME     O2 Delivery Post Treatment room air  -AME     Pre Patient Position Supine  -AME     Post Patient Position Sitting  -AME       Row Name 06/05/23 0958          Positioning and Restraints    Pre-Treatment Position in bed  -AME     Post Treatment Position chair  -AME     In Chair reclined;call light within  reach;encouraged to call for assist  no alarm system available.  -       Row Name 06/05/23 0958          Therapy Assessment/Plan (PT)    Rehab Potential (PT) good, to achieve stated therapy goals  -       Row Name 06/05/23 0958          Bed Mobility Goal 1 (PT)    Activity/Assistive Device (Bed Mobility Goal 1, PT) bed mobility activities, all  -AME     [place]mer Level/Cues Needed (Bed Mobility Goal 1, PT) standby assist;independent  -AME     Time Frame (Bed Mobility Goal 1, PT) by discharge  -AME     Progress/Outcomes (Bed Mobility Goal 1, PT) goal met   -[facility] Name 06/05/23 0958          Transfer Goal 1 (PT)    Activity/Assistive Device (Transfer Goal 1, PT) sit-to-stand/stand-to-sit;bed-to-chair/chair-to-bed  -AME     [place] Level/Cues Needed (Transfer Goal 1, PT) contact guard required;standby assist;modified independence  -AME     Time Frame (Transfer Goal 1, PT) by discharge  -[facility] Name 06/05/23 0958          Gait Training Goal 1 (PT)    Activity/Assistive Device (Gait Training Goal 1, PT) gait (walking locomotion);decrease fall risk  -     [place] Level (Gait Training Goal 1, PT) contact guard required;standby assist;modified independence  -AME     Distance (Gait Training Goal 1, PT) 50ft or more each trip  -AME     Time Frame (Gait Training Goal 1, PT) 1 week  -AME     Progress/Outcome (Gait Training Goal 1, PT) goal not met  -[facility] Name 06/05/23 0958          ROM Goal 1 (PT)    ROM Goal 1 (PT) Pt will tolerate LE exercises OOB in chair with VSS  -AME     Time Frame (ROM Goal 1, PT) by discharge  -AME     Progress/Outcome (ROM Goal 1, PT) goal met   -               User Key  (r) = Recorded By, (t) = Taken By, (c) = Cosigned By      Initials Name Provider Type    Ousmane Gomes PTA Physical Therapist Assistant    Juany Sage, RN Registered Nurse    Janae Severino LPN Licensed Nurse                    Physical Therapy Education       Title: PT OT SLP Therapies  (In Progress)       Topic: Physical Therapy (In Progress)       Point: Mobility training (In Progress)       Learning Progress Summary             Patient Acceptance, E, NR by AME at 6/5/2023 1036    Acceptance, E,TB, VU by LW at 6/4/2023 1458    Acceptance, E, VU by AB at 6/1/2023 0535    Acceptance, E, VU by AB at 5/31/2023 0519    Acceptance, E, NR by AME at 5/29/2023 1123    Acceptance, E, NR by AME at 5/26/2023 1356    Acceptance, E,TB, VU by LW at 5/25/2023 1459    Acceptance, E, NR by AME at 5/24/2023 1306    Acceptance, E,TB, VU by NB at 5/18/2023 2138    Acceptance, E, NR by JC1 at 5/18/2023 1407                         Point: Home exercise program (Done)       Learning Progress Summary             Patient Acceptance, E,TB, VU by LW at 6/4/2023 1458    Acceptance, E, VU by AB at 6/1/2023 0535    Acceptance, E, VU by AB at 5/31/2023 0519    Acceptance, E, VU by AB at 5/30/2023 0516    Acceptance, E,TB, VU by LW at 5/25/2023 1459                         Point: Body mechanics (Done)       Learning Progress Summary             Patient Acceptance, E,TB, VU by LW at 6/4/2023 1458    Acceptance, E, VU by AB at 6/1/2023 0535    Acceptance, E, VU by AB at 5/31/2023 0519    Acceptance, E, VU by AB at 5/30/2023 0516    Acceptance, E,TB, VU by LW at 5/25/2023 1459    Acceptance, E,TB, VU by JH at 5/25/2023 1451                         Point: Precautions (Done)       Learning Progress Summary             Patient Acceptance, E,TB, VU by LW at 6/4/2023 1458    Acceptance, E, VU by AB at 6/1/2023 0535    Acceptance, E, VU by AB at 5/31/2023 0519    Acceptance, E, VU by AB at 5/30/2023 0516    Acceptance, E,TB, VU by LW at 5/25/2023 1459    Acceptance, E, NR by JC1 at 5/18/2023 140                                         User Key       Initials Effective Dates Name Provider Type Discipline    JC1 06/16/21 -  Aure Villaseñor, PT Physical Therapist PT    AME 06/16/21 -  Ousmane Zhang, PTA Physical Therapist Assistant PT    LW  06/16/21 -  Sarah Irby COTA Occupational Therapist Assistant OT    NB 06/16/21 -  Samantha Cuellar, RN Registered Nurse Nurse     09/08/22 -  Janae Amaya LPN Licensed Nurse Nurse    AB 11/07/22 -  Joana Mc LPN Licensed Nurse Nurse                  PT Recommendation and Plan  Anticipated Discharge Disposition (PT): inpatient rehabilitation facility  Therapy Frequency (PT): other (see comments) (5-7 days/wk)  Plan of Care Reviewed With: patient  Progress: improving  Outcome Evaluation: pt responded well to PT with increased gait to 15 ft CGA with RW. pt is mod indep with bed mobility. CGA-min A for t/fs. pt participatd in 2 sets of LE ther ex with increased reps. no new goals met at this time. pt would benefit from short stay on ARU.   Outcome Measures       Row Name 06/05/23 0958             How much help from another person do you currently need...    Turning from your back to your side while in flat bed without using bedrails? 3  -AME      Moving from lying on back to sitting on the side of a flat bed without bedrails? 3  -AME      Moving to and from a bed to a chair (including a wheelchair)? 3  -AME      Standing up from a chair using your arms (e.g., wheelchair, bedside chair)? 3  -AME      Climbing 3-5 steps with a railing? 1  -AME      To walk in hospital room? 3  -AME      AM-PAC 6 Clicks Score (PT) 16  -AME         Functional Assessment    Outcome Measure Options AM-PAC 6 Clicks Basic Mobility (PT)  -AME                User Key  (r) = Recorded By, (t) = Taken By, (c) = Cosigned By      Initials Name Provider Type    Ousmane Gomes, PTA Physical Therapist Assistant                     Time Calculation:    PT Charges       Row Name 06/05/23 1040             Time Calculation    Start Time 0958  -AME      Stop Time 1040  -AME      Time Calculation (min) 42 min  -AME         Time Calculation- PT    Total Timed Code Minutes- PT 42 minute(s)  -AME         Timed Charges    85324 - PT Therapeutic  Exercise Minutes 27  -AME      76226 - PT Therapeutic Activity Minutes 15  -AME         Total Minutes    Timed Charges Total Minutes 42  -AME       Total Minutes 42  -AME                User Key  (r) = Recorded By, (t) = Taken By, (c) = Cosigned By      Initials Name Provider Type    Ousmane Gomes PTA Physical Therapist Assistant                  Therapy Charges for Today       Code Description Service Date Service Provider Modifiers Qty    99023323764 HC PT THER PROC EA 15 MIN 6/5/2023 Ousmane Zhang PTA GP 2    14099343872 HC PT THERAPEUTIC ACT EA 15 MIN 6/5/2023 Ousmane Zhang PTA GP 1            PT G-Codes  Outcome Measure Options: AM-PAC 6 Clicks Basic Mobility (PT)  AM-PAC 6 Clicks Score (PT): 16  AM-PAC 6 Clicks Score (OT): 21    Ousmane Zhang PTA  6/5/2023

## 2023-06-05 NOTE — PLAN OF CARE
Goal Outcome Evaluation:  Plan of Care Reviewed With: patient           Outcome Evaluation: OT tx complete, supine in bed, agreeable for EOB thereex, patient has had prosthetic on earlier with PT and had walked with PT early, reports feeling sore several spots of knee with prosthetic wearing. 3 sets x 20 reps of BUE exercises performed EOB. Patient agreeable to perform sit to stands, without prosthetic, and RW. 2 sets x 5 reps performed, patient tolerated well. Offered ADLs, patient deferred as she performed them this morning.  Returned to bed, all needs in reach.

## 2023-06-05 NOTE — PROGRESS NOTES
[facility] Medicine Services  INPATIENT PROGRESS NOTE    Length of Stay: 19  Date of Admission: 5/17/2023  Primary Care Physician: Dolly Foss APRN    Subjective   (S) Admitted for cough, fever, chills and diagnosed with Right lower extremity cellulitis  Today, well awake and no pain reported on lower extremity;     Review of Systems   All other systems reviewed and are negative.   All pertinent negatives and positives are as above. All other systems have been reviewed and are negative unless otherwise stated.     Prior to Admission medications    Medication Sig Start Date End Date Taking? Authorizing Provider   ARIPiprazole (ABILIFY) 5 MG tablet Take 1 tablet by mouth Daily. 3/10/23  Yes Dolly Foss APRN   aspirin  MG tablet Take 1 tablet by mouth Daily. 2/8/22  Yes Mahin Esteves MD   atorvastatin (LIPITOR) 80 MG tablet Take 1 tablet by mouth Daily. 9/7/22  Yes Dolly Foss APRN   butalbital-acetaminophen-caffeine (FIORICET, ESGIC) -40 MG per tablet Take one to two tablets by mouth per headache episode as needed. 4/21/23  Yes Dolly Foss APRN   Calcium Citrate-Vitamin D 250-200 MG-UNIT tablet Take 2 tablets by mouth 2 (two) times a day.   Yes Esther Henao MD   clopidogrel (PLAVIX) 75 MG tablet Take 1 tablet by mouth Daily. 3/28/23  Yes Dolly Foss APRN   cyanocobalamin 1000 MCG/ML injection INJECT 1 ML INTO THE APPROPRIATE MUSCLE AS DIRECTED BY PRESCRIBER EVERY 28 (TWENTY-EIGHT) DAYS. 4/7/23  Yes Pranav Sutton MD   famotidine (PEPCID) 40 MG tablet Take 1 tablet by mouth Daily. 4/14/23  Yes Esther Henao MD   fluticasone (FLONASE) 50 MCG/ACT nasal spray INSTILL 2 SPRAYS IN EACH NOSTRIL AS DIRECTED BY PROVIDER DAILY  Patient taking differently: As Needed. 2/1/23  Yes Dolly Foss APRN   folic acid (FOLVITE) 1 MG tablet TAKE 1 TABLET BY MOUTH EVERY DAY 4/18/23  Yes Teressa Hammond APRN   furosemide (LASIX) 40 MG  tablet TAKE 1 TABLET BY MOUTH EVERY DAY 2/10/23  Yes Dolly Foss APRN   gabapentin (NEURONTIN) 100 MG capsule TAKE 1 CAPSULE BY MOUTH EVERY 12 HOURS 4/17/23  Yes Dolly Foss APRN   hydroCHLOROthiazide (HYDRODIURIL) 12.5 MG tablet TAKE 1 TABLET BY MOUTH EVERY DAY 3/15/23  Yes Dolly Foss APRN   HYDROcodone-acetaminophen (NORCO) 7.5-325 MG per tablet Take 1 tablet by mouth Every 6 (Six) Hours As Needed for Moderate Pain. Pls fill on 5/10 5/5/23  Yes Dolly Foss APRN   insulin aspart (NovoLOG FlexPen) 100 UNIT/ML solution pen-injector sc pen INJECT 30 UNITS UNDER SKIN AS DIRECTED 3 TIMES A DAY WITH MEALS 3/9/23  Yes Elvis Gamboa APRN   Insulin Glargine (BASAGLAR KWIKPEN) 100 UNIT/ML injection pen Inject 40 units into the appropriate area every morning and 35 units into the appropriate area every night 10/24/22  Yes Elvis Gamboa APRN   isosorbide mononitrate (IMDUR) 30 MG 24 hr tablet TAKE 1 TABLET BY MOUTH EVERY DAY 4/11/23  Yes Dolly Foss APRN   levothyroxine (Synthroid) 50 MCG tablet Take 1 tablet by mouth Daily. 4/26/23 4/25/24 Yes Elvis Gamboa APRN   meclizine (ANTIVERT) 25 MG tablet Take 1 tablet by mouth 3 (Three) Times a Day As Needed for dizziness. 12/14/17  Yes Evon Hernandez APRN   methocarbamol (ROBAXIN) 500 MG tablet TAKE 1 TABLET BY MOUTH 2 TIMES A DAY AS NEEDED FOR MUSCLE SPASMS. 6/7/22  Yes Kimberly Ferguson APRN   metoclopramide (REGLAN) 10 MG tablet TAKE 1 TABLET BY MOUTH 4 (FOUR) TIMES A DAY AS NEEDED (NAUSEA). 11/29/22  Yes Dolly Foss APRN   metoprolol succinate XL (TOPROL-XL) 50 MG 24 hr tablet Take 1 tablet by mouth Daily. Dose increased 5/24/21 12/27/22  Yes Dolly Foss APRN   pantoprazole (PROTONIX) 20 MG EC tablet Take 2 tablets by mouth Daily. 2/20/23  Yes Dolly Foss APRN   ranolazine (RANEXA) 500 MG 12 hr tablet Take 1 tablet by mouth Every 12 (Twelve) Hours. 6/29/22  Yes Bill Gibson MD  "  sucralfate (Carafate) 1 g tablet Take 1 tablet by mouth 4 (Four) Times a Day. 1/11/23  Yes Marleny Montoya PA-C   venlafaxine XR (EFFEXOR-XR) 75 MG 24 hr capsule Take 1 capsule by mouth Daily With Breakfast. 3/10/23  Yes Dolly Foss APRN   vitamin D (ERGOCALCIFEROL) 1.25 MG (24606 UT) capsule capsule TAKE 1 CAPSULE BY MOUTH EVERY 7 DAYS. 12/29/22  Yes Dolly Foss APRN   amLODIPine (NORVASC) 5 MG tablet TAKE 1 TABLET BY MOUTH EVERY DAY 5/26/23   Pranav Sutton MD B-EVY ULTRAFINE III SHORT PEN 31G X 8 MM misc USE TO INJECT 5 TIMES A DAY 11/11/22   Elvis Gamboa APRN B-D ULTRAFINE III SHORT PEN 31G X 8 MM misc USE UP TO 4 (FOUR) TIMES DAILY WITH INSULIN AS DIRECTED. 12/27/22   Dolly Foss APRN   BD SHARPS CONTAINER HOME misc 1 each Take As Directed. 9/7/18   Francisca Fong MD   Blood Glucose Monitoring Suppl (ONE TOUCH ULTRA MINI) w/Device kit Patient will need the Accu-Check Avitio meter 10/18/21   Marty, BEBE Luu   dicyclomine (BENTYL) 20 MG tablet Take 1 tablet by mouth Every 6 (Six) Hours. 5/5/23   Provider, MD Esther   glucose blood (Accu-Chek Guide) test strip 1 each by Other route 4 (Four) Times a Day. To test blood sugar 4X daily. For  Dx E11.65 2/28/23   Marty, BEBE Luu   glucose monitor monitoring kit 1 each Daily. accucheck eve meter, E11.9 6/11/20   Elvis Gamboa APRN   Lancets (accu-chek soft touch) lancets As directed 10/18/21   Marty, BEBE Luu   meloxicam (MOBIC) 15 MG tablet TAKE 1 TABLET BY MOUTH EVERY DAY WITH FOOD 12/7/22   Rohan Lazo MD   naloxone (NARCAN) 0.4 MG/ML injection Infuse 0.5 mL into a venous catheter Every 5 (Five) Minutes As Needed for Opioid Reversal. 3/13/21   Nick Faye MD   Needle, Disp, (Easy Touch FlipLock Needles) 25G X 1-1/2\" misc 1 each Every 28 (Twenty-Eight) Days. For administering B12 cyanocobalomin. Dx vitamin B12 deficiency. 5/24/22   Ferguson, BEBE Luu   Needle, " "Disp, (Hypodermic Needle) 20G X 1\" misc 1 each Every 28 (Twenty-Eight) Days. For drawing up B12 for injection monthly, change back to smaller gauge needle prior to injection. Dx Vitamin B12  Deficiency. 5/24/22   Kimberly Ferguson APRN   nitroglycerin (NITROSTAT) 0.4 MG SL tablet Place 1 tablet under the tongue Every 5 (Five) Minutes As Needed for Chest Pain. Take no more than 3 doses in 15 minutes. 4/26/22   Kimberly Ferguson APRN   ondansetron (ZOFRAN) 8 MG tablet Take 1 tablet by mouth Every 8 (Eight) Hours As Needed for Nausea or Vomiting. 3/27/23   Dolly Foss APRN   ondansetron ODT (ZOFRAN-ODT) 4 MG disintegrating tablet Place 1 tablet on the tongue 4 (Four) Times a Day As Needed for Nausea or Vomiting. 2/27/23   Dolly Foss APRN   Syringe 20G X 1-1/2\" 3 ML misc 1 each Every 28 (Twenty-Eight) Days. For injection cyanocobalomin, Dx vit B12 deficiency. 5/24/22   Kimberly Ferguson APRN       amLODIPine, 5 mg, Oral, Daily  ARIPiprazole, 5 mg, Oral, Daily  aspirin EC, 325 mg, Oral, Daily  atorvastatin, 80 mg, Oral, Daily  Bag Balm, 1 application, Topical, BID  clopidogrel, 75 mg, Oral, Daily  cyanocobalamin, 1,000 mcg, Intramuscular, Q28 Days  folic acid, 1,000 mcg, Oral, Daily  furosemide, 40 mg, Oral, Daily  gabapentin, 200 mg, Oral, Q12H  heparin (porcine), 5,000 Units, Subcutaneous, Q8H  hydroCHLOROthiazide, 12.5 mg, Oral, Daily  Insulin Aspart, 0-14 Units, Subcutaneous, TID AC  insulin detemir, 30 Units, Subcutaneous, Daily  insulin detemir, 45 Units, Subcutaneous, Nightly  isosorbide mononitrate, 30 mg, Oral, Daily  levothyroxine, 50 mcg, Oral, QAM  pantoprazole, 40 mg, Oral, Daily  ranolazine, 500 mg, Oral, Q12H  senna-docusate sodium, 2 tablet, Oral, BID  sodium chloride, 10 mL, Intravenous, Q12H  sodium chloride, 10 mL, Intravenous, Q12H  sucralfate, 1 g, Oral, 4x Daily  tamsulosin, 0.4 mg, Oral, Daily  venlafaxine XR, 75 mg, Oral, Daily With Breakfast           Objective    Temp:  [96.4 °F " (35.8 °C)-98.2 °F (36.8 °C)] 97.5 °F (36.4 °C)  Heart Rate:  [76-86] 85  Resp:  [18-20] 20  BP: (105-142)/(52-62) 106/52    Physical Exam  Constitutional:       Appearance: She is obese.   HENT:      Nose: Nose normal.   Eyes:      Extraocular Movements: Extraocular movements intact.   Cardiovascular:      Rate and Rhythm: Regular rhythm.      Heart sounds: Normal heart sounds.   Pulmonary:      Breath sounds: Normal breath sounds.   Abdominal:      General: Bowel sounds are normal.      Palpations: Abdomen is soft.   Musculoskeletal:         General: Normal range of motion.      Cervical back: Normal range of motion and neck supple.      Comments: Left BKA, old   Skin:     General: Skin is warm.      Comments: Right distal leg with some scaling skin, likely due to her resolving cellulitis   Neurological:      General: No focal deficit present.      Mental Status: She is alert. Mental status is at baseline.   Psychiatric:         Behavior: Behavior normal.           Results Review:  I have reviewed the labs, radiology results, and diagnostic studies.    Laboratory Data:         Invalid input(s): PATRICK CARTAGENA  Estimated Creatinine Clearance: 85.9 mL/min (by C-G formula based on SCr of 0.92 mg/dL).          Results from last 7 days   Lab Units 06/05/23  0532 06/04/23  0540 06/03/23  0623 06/02/23  0533 06/01/23  0603   WBC 10*3/mm3 12.17* 10.39 9.13 9.62 9.82   HEMOGLOBIN g/dL 11.7* 11.8* 11.8* 11.8* 12.3   HEMATOCRIT % 34.7 36.1 36.8 36.7 38.0   PLATELETS 10*3/mm3 396 431 423 422 460*             Culture Data:   No results found for: BLOODCX  No results found for: URINECX  No results found for: RESPCX  No results found for: WOUNDCX  No results found for: STOOLCX  No components found for: BODYFLD    Radiology Data:   Imaging Results (Last 24 Hours)       ** No results found for the last 24 hours. **            I have reviewed the patient's current medications.     Assessment/Plan   Right lower leg cellulitis     With  US venous doppler negative for DVT     Was on vanco and zosyn; resolved     Sepsis  (POA)     With fever, leukocytosis and left shifting      Resolved     Physical deconditioning     PT and OT on the case     CM working on placement to ARU    Urinary retention     During her hospitalization: resolved       Chronic medical problems     Essential hypertension     CAD     Type II DM     Bipolar disorder     Hyperlipidemia     Left BKA; uses prosthesis         Medical Decision Making  Number and Complexity of problems: one major complex  Differential Diagnosis: pneumonia    Conditions and Status:        Condition is improving.     MDM Data  External documents reviewed: previous medical records  My EKG interpretation: n/a  My plain film interpretation: no acute event     Discussed with: patient     Treatment Plan  See above    Care Planning  Shared decision making: her  Nikia  Code status and discussions: full code    Disposition  Social Determinants of Health that impact treatment or disposition: none  I expect the patient to be discharged to  rehab unit in 1-2 days or when accepted     I confirmed that the patient's Advance Care Plan is present, code status is documented, or surrogate decision maker is listed in the patient's medical record.     Nader Higgins MD

## 2023-06-05 NOTE — THERAPY TREATMENT NOTE
Patient Name: Ariana Martinez  : 1960    MRN: [id number]                              Today's Date: 2023       Admit Date: 2023    Visit Dx:     ICD-10-CM ICD-9-CM   1. Sepsis without acute organ dysfunction, due to unspecified organism  A41.9 038.9     995.91   2. Cellulitis of right lower extremity  L03.115 682.6   3. Type 2 diabetes mellitus with hyperglycemia, unspecified whether long term insulin use  E11.65 250.00   4. Impaired mobility and activities of daily living  Z74.09 V49.89    Z78.9    5. Impaired physical mobility  Z74.09 781.99   6. Impaired mobility and ADLs  Z74.09 V49.89    Z78.9    7. Urinary retention  R33.9 788.20     Patient Active Problem List   Diagnosis    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, with long-term current use of insulin    Closed nondisplaced fracture of fifth left metatarsal bone    MAURICIO (generalized anxiety disorder)    Depression, major, recurrent, moderate (HCC)    GERD without esophagitis    Long term prescription opiate use    Mixed hyperlipidemia    Vitamin D deficiency    Seasonal allergic rhinitis    Restrictive lung disease secondary to obesity    Adult body mass index 37.0-37.9    Snoring    Class 3 severe obesity due to excess calories without serious comorbidity with body mass index (BMI) of 40.0 to 44.9 in adult (HCC)    (HFpEF) heart failure with preserved ejection fraction    Type 2 diabetes mellitus with hyperglycemia, with long-term current use of insulin (HCC)    Cyanocobalamin deficiency    Coronary artery disease of native artery of native heart with stable angina pectoris    Hypertension    Meniere's disease    Gastroparesis    Pulmonary hypertension (HCC)    Pes cavus    Primary osteoarthritis involving multiple joints    Generalized anxiety disorder    Chronic right-sided low back pain with right-sided sciatica    Chest pain    Adverse effect of iron    Chest pain due to myocardial ischemia    Nonrheumatic  tricuspid valve regurgitation    Iron deficiency anemia due to chronic blood loss    Malaise and fatigue    Ankle arthritis    Urinary retention    Endocarditis    Essential hypertension    Occlusion and stenosis of bilateral carotid arteries    S/P BKA (below knee amputation) unilateral, left (HCC)    Phantom pain after amputation of lower extremity    S/P CABG (coronary artery bypass graft)    Severe malnutrition    TIA (transient ischemic attack)    Coronary artery abnormality    Elevated d-dimer    Neuropathy    Chest pain, unspecified type    Acute pain of right shoulder    Rotator cuff syndrome, right    Acquired hypothyroidism    Bilateral leg edema    Left elbow pain    Gastritis    Olecranon bursitis, left elbow    Sepsis without acute organ dysfunction, due to unspecified organism     Past Medical History:   Diagnosis Date    Acute bacterial endocarditis 3/13/2021    Angina, class IV     Anxiety     Anxiety and depression     Arthritis     Benign paroxysmal positional vertigo     Bladder disorder     has bladder stimulator    Carpal tunnel syndrome     CHF (congestive heart failure)     Chronic pain     Coronary atherosclerosis     hx CABG 2005.  is followed by Dr Cher Amaya     Diabetes mellitus     Type 2, controlled    Diabetic polyneuropathy     Disease of thyroid gland     Elevated cholesterol     Female stress incontinence     Foot pain, left     Full dentures     Gastroparesis     GERD (gastroesophageal reflux disease)     Hyperlipidemia     Hypertension     Low back pain     Malaise and fatigue     Multiple joint pain     Obesity     Refuses to be weighed    Occlusion and stenosis of bilateral carotid arteries 6/18/2021    Otalgia     Both    Perforation of tympanic membrane     Left    Postoperative wound infection     Vitamin D deficiency     Wears glasses     reading     Past Surgical History:   Procedure Laterality Date    ABDOMINAL SURGERY      AMPUTATION FOOT      ANGIOPLASTY       coronary    ANKLE ARTHROSCOPY Left 02/26/2021    Procedure: Left foot hardware removal, ankle arthroscopy, bone grafting , left foot exostectomy;  Surgeon: Ignacio Lord DPM;  Location: [facility] OR;  Service: Podiatry;  Laterality: Left;    BREAST BIOPSY Right     CARDIAC CATHETERIZATION      CARDIAC CATHETERIZATION N/A 06/20/2017    Procedure: Right Heart Cath;  Surgeon: Can Kwon MD PhD;  Location: [facility] CATH INVASIVE LOCATION;  Service:     CARDIAC CATHETERIZATION N/A 02/18/2020    Procedure: Left Heart Cath;  Surgeon: Catalina Cooper MD;  Location: [facility] CATH INVASIVE LOCATION;  Service: Cardiology;  Laterality: N/A;    CARDIAC CATHETERIZATION N/A 04/06/2022    Procedure: Left Heart Cath;  Surgeon: Sheryl Navas MD;  Location: [facility] CATH INVASIVE LOCATION;  Service: Cardiology;  Laterality: N/A;    CARPAL TUNNEL RELEASE      CHOLECYSTECTOMY      COLONOSCOPY N/A 06/24/2020    Procedure: COLONOSCOPY;  Surgeon: Julián Maldonado MD;  Location: [facility] ENDOSCOPY;  Service: Gastroenterology;  Laterality: N/A;    CORONARY ARTERY BYPASS GRAFT  2005    ENDOSCOPY N/A 10/19/2018    Procedure: ESOPHAGOGASTRODUODENOSCOPY possible dilation;  Surgeon: Julián Maldonado MD;  Location: [facility] ENDOSCOPY;  Service: Gastroenterology    ENDOSCOPY N/A 06/24/2020    Procedure: ESOPHAGOGASTRODUODENOSCOPY WED appt please;  Surgeon: Julián Maldonado MD;  Location: [facility] ENDOSCOPY;  Service: Gastroenterology;  Laterality: N/A;    ENDOSCOPY N/A 06/10/2022    Procedure: ESOPHAGOGASTRODUODENOSCOPY   room 380;  Surgeon: Jeremiah Wilkins MD;  Location: [facility] ENDOSCOPY;  Service: Gastroenterology;  Laterality: N/A;    ENDOSCOPY N/A 1/10/2023    Procedure: ESOPHAGOGASTRODUODENOSCOPY;  Surgeon: Jeremiah Wilkins MD;  Location: [facility] ENDOSCOPY;  Service: Gastroenterology;  Laterality: N/A;    ENDOSCOPY AND COLONOSCOPY      FOOT SURGERY      Toes    FOOT SURGERY      GASTRIC BANDING      Revision, laparoscopic     HYSTERECTOMY      INCISION AND DRAINAGE LEG Left 03/12/2021    Procedure: Left ankle arthroscopic irrigation and debridement, screw removal;  Surgeon: Ignacio Lord DPM;  Location: [facility];  Service: Podiatry;  Laterality: Left;    MOUTH SURGERY      SALPINGO OOPHORECTOMY      SHOULDER SURGERY      SUBTALAR ARTHRODESIS Left 01/16/2019    Procedure: LEFT FOOT HARDWARE REMOVAL, FIFTH METATARSAL , OPEN REDUCTION INTERNAL FIXATION, CALCANEAL OSTEOTOMY;  Surgeon: Ignacio Lord DPM;  Location: [facility] OR;  Service: Podiatry    SUBTALAR ARTHRODESIS Left 10/16/2019    Procedure: foot hardware removal, subtalar joint fusion  possible de/reattachment of achilles tendon        (c-arm);  Surgeon: Ignacio Lord DPM;  Location: [facility];  Service: Podiatry    SUBTALAR ARTHRODESIS Left 09/30/2020    Procedure: subtalar, talonavicular joint arthrodesis.  Removal hardware.          (c-arm);  Surgeon: Ignacio Lord DPM;  Location: [facility];  Service: Podiatry;  Laterality: Left;    TRANSESOPHAGEAL ECHOCARDIOGRAM (LAMONTE)      With color flow      General Information       Row Name 06/05/23 1215          OT Time and Intention    Document Type therapy note (daily note)  -     Mode of Treatment occupational therapy  -       Row Name 06/05/23 1215          General Information    Patient Profile Reviewed yes  -     Existing Precautions/Restrictions fall  -       Row Name 06/05/23 1215          Cognition    Orientation Status (Cognition) oriented x 4  -       Row Name 06/05/23 1215          Safety Issues, Functional Mobility    Impairments Affecting Function (Mobility) balance;endurance/activity tolerance;pain  -               User Key  (r) = Recorded By, (t) = Taken By, (c) = Cosigned By      Initials Name Provider Type     Francisco Randall OT Occupational Therapist                     Mobility/ADL's       Row Name 06/05/23 1215          Bed Mobility    Bed Mobility supine-sit;sit-supine  -      Rolling Left [place] (Bed Mobility) modified independence  -SJ     Rolling Right [place] (Bed Mobility) modified independence  -SJ     Scooting/Bridging [place] (Bed Mobility) modified independence  -SJ     Supine-Sit [place] (Bed Mobility) modified independence  -SJ     Sit-Supine [place] (Bed Mobility) modified independence  -SJ       Row Name 06/05/23 1215          Transfers    Transfers sit-stand transfer;stand-sit transfer  -SJ       Row Name 06/05/23 1215          Sit-Stand Transfer    Sit-Stand [place] (Transfers) contact guard  -     Assistive Device (Sit-Stand Transfers) walker, front-wheeled  -SJ       Row Name 06/05/23 1215          Stand-Sit Transfer    Stand-Sit [place] (Transfers) contact guard  -     Assistive Device (Stand-Sit Transfers) walker, front-wheeled  -SJ               User Key  (r) = Recorded By, (t) = Taken By, (c) = Cosigned By      Initials Name Provider Type    SJ Francisco Randall, OT Occupational Therapist                   Obj/Interventions       Row Name 06/05/23 1215          Shoulder (Therapeutic Exercise)    Shoulder (Therapeutic Exercise) strengthening exercise  -     Shoulder Strengthening (Therapeutic Exercise) bilateral;flexion;extension;aBduction;aDduction;horizontal aBduction/aDduction;20 repititions;3 sets;3 lb free weight  -       Row Name 06/05/23 1215          Elbow/Forearm (Therapeutic Exercise)    Elbow/Forearm (Therapeutic Exercise) strengthening exercise  -     Elbow/Forearm Strengthening (Therapeutic Exercise) bilateral;flexion;extension;3 sets;20 repititions;3 lb free weight  -       Row Name 06/05/23 1215          Wrist (Therapeutic Exercise)    Wrist (Therapeutic Exercise) strengthening exercise  -     Wrist Strengthening (Therapeutic Exercise) bilateral;flexion;extension;3 sets;20 repititions;3 lb free weight  -       Row Name 06/05/23 1215          Hand (Therapeutic Exercise)    Hand AROM/AAROM (Therapeutic  Exercise) 3 sets;20 repititions  -       Row Name 06/05/23 1215          Motor Skills    Therapeutic Exercise shoulder;elbow/forearm;wrist;hand  -               User Key  (r) = Recorded By, (t) = Taken By, (c) = Cosigned By      Initials Name Provider Type     Francisco Randall OT Occupational Therapist                   Goals/Plan    No documentation.                  Clinical Impression       [facility] Name 06/05/23 1215          Pain Assessment    Pretreatment Pain Rating 4/10  -SJ     Posttreatment Pain Rating 4/10  -SJ     Pain Location - Side/Orientation Right  -     Pain Location - foot  -     Pain Intervention(s) Ambulation/increased activity;Repositioned  -       Row Name 06/05/23 1215          Plan of Care Review    Plan of Care Reviewed With patient  -     Outcome Evaluation OT tx complete, supine in bed, agreeable for EOB thereex, patient has had prosthetic on earlier with PT and had walked with PT early, reports feeling sore several spots of knee with prosthetic wearing. 3 sets x 20 reps of BUE exercises performed EOB. Patient agreeable to perform sit to stands, without prosthetic, and RW. 2 sets x 5 reps performed, patient tolerated well. Offered ADLs, patient deferred as she performed them this morning.  Returned to bed, all needs in reach.  -[facility] Name 06/05/23 1215          Therapy Plan Review/Discharge Plan (OT)    Anticipated Discharge Disposition (OT) inpatient rehabilitation facility  -[facility] Name 06/05/23 1215          Vital Signs    Pre Systolic BP Rehab 113  -SJ     Pre Treatment Diastolic BP 68  -SJ     Pretreatment Heart Rate (beats/min) 92  -SJ     Pre SpO2 (%) 94  -SJ     O2 Delivery Pre Treatment room air  -[facility] Name 06/05/23 1215          Positioning and Restraints    Pre-Treatment Position in bed  -     Post Treatment Position bed  -SJ     In Bed notified nsg;supine;call light within reach;encouraged to call for assist;exit alarm on  -                User Key  (r) = Recorded By, (t) = Taken By, (c) = Cosigned By      Initials Name Provider Type    Francisco Camarena, OT Occupational Therapist                   Outcome Measures       Row Name 06/05/23 1215          How much help from another is currently needed...    Putting on and taking off regular lower body clothing? 3  -SJ     Bathing (including washing, rinsing, and drying) 3  -SJ     Toileting (which includes using toilet bed pan or urinal) 3  -SJ     Putting on and taking off regular upper body clothing 4  -SJ     Taking care of personal grooming (such as brushing teeth) 4  -SJ     Eating meals 4  -     AM-PAC 6 Clicks Score (OT) 21  -       Row Name 06/05/23 0958 06/05/23 0825       How much help from another person do you currently need...    Turning from your back to your side while in flat bed without using bedrails? 3  -AME 3  -LH    Moving from lying on back to sitting on the side of a flat bed without bedrails? 3  -AME 3  -LH    Moving to and from a bed to a chair (including a wheelchair)? 3  -AME 3  -LH    Standing up from a chair using your arms (e.g., wheelchair, bedside chair)? 3  -AME 3  -LH    Climbing 3-5 steps with a railing? 1  -AME 1  -LH    To walk in hospital room? 3  -AME 3  -LH    AM-PAC 6 Clicks Score (PT) 16  - 16  -    Highest level of mobility 5 --> Static standing  - 5 --> Static standing  -      Row Name 06/05/23 1215 06/05/23 0958       Functional Assessment    Outcome Measure Options AM-PAC 6 Clicks Daily Activity (OT)  - AM-PAC 6 Clicks Basic Mobility (PT)  -              User Key  (r) = Recorded By, (t) = Taken By, (c) = Cosigned By      Initials Name Provider Type    Ousmane Gomes PTA Physical Therapist Assistant    Francisco Camarena, OT Occupational Therapist    LH Juany Singh, RN Registered Nurse                    Occupational Therapy Education       Title: PT OT SLP Therapies (In Progress)       Topic: Occupational Therapy (Done)       Point: ADL  training (Done)       Description:   Instruct learner(s) on proper safety adaptation and remediation techniques during self care or transfers.   Instruct in proper use of assistive devices.                  Learning Progress Summary             Patient Acceptance, E,TB, VU by LW at 6/4/2023 1458    Acceptance, E, VU by AB at 6/1/2023 0535    Acceptance, E, VU by AB at 5/31/2023 0519    Acceptance, E,TB, VU by MC at 5/30/2023 1133    Comment: OT role, POC, t/f training    Acceptance, E, VU by AB at 5/30/2023 0516    Acceptance, E,TB, VU by LW at 5/26/2023 1516    Acceptance, E,TB, VU by LW at 5/25/2023 1459    Acceptance, E,TB, VU by RW at 5/22/2023 1243    Comment: POC, Role of OT    Acceptance, E,TB, VU by BB at 5/20/2023 1358    Acceptance, E,TB, VU by BB at 5/20/2023 1357                         Point: Home exercise program (Done)       Description:   Instruct learner(s) on appropriate technique for monitoring, assisting and/or progressing therapeutic exercises/activities.                  Learning Progress Summary             Patient Acceptance, E,TB, VU by LW at 6/4/2023 1458    Acceptance, E, VU by AB at 6/1/2023 0535    Acceptance, E, VU by AB at 5/31/2023 0519    Acceptance, E, VU by AB at 5/30/2023 0516    Acceptance, E,TB, VU by LW at 5/26/2023 1516    Acceptance, E,TB, VU by LW at 5/25/2023 1459    Acceptance, E,TB, VU by BB at 5/20/2023 1356                         Point: Precautions (Done)       Description:   Instruct learner(s) on prescribed precautions during self-care and functional transfers.                  Learning Progress Summary             Patient Acceptance, E,TB, VU by LW at 6/4/2023 1458    Acceptance, E, VU by AB at 6/1/2023 0535    Acceptance, E, VU by AB at 5/31/2023 0519    Acceptance, E,TB, VU by MC at 5/30/2023 1133    Comment: OT role, POC, t/f training    Acceptance, E, VU by AB at 5/30/2023 0516    Acceptance, E,TB, VU by LW at 5/26/2023 1516    Acceptance, E,TB, VU by LW at  5/25/2023 1459    Acceptance, E,TB, VU by RW at 5/22/2023 1243    Comment: POC, Role of OT    Acceptance, E, DU by RB at 5/18/2023 1352    Comment: Pt edu on use of gait belt and non skid socks when OOB and no OOB without assist.                         Point: Body mechanics (Done)       Description:   Instruct learner(s) on proper positioning and spine alignment during self-care, functional mobility activities and/or exercises.                  Learning Progress Summary             Patient Acceptance, E,TB, VU by LW at 6/4/2023 1458    Acceptance, E, VU by AB at 6/1/2023 0535    Acceptance, E, VU by AB at 5/31/2023 0519    Acceptance, E,TB, VU by MC at 5/30/2023 1133    Comment: OT role, POC, t/f training    Acceptance, E, VU by AB at 5/30/2023 0516    Acceptance, E,TB, VU by LW at 5/26/2023 1516    Acceptance, E,TB, VU by LW at 5/25/2023 1459    Acceptance, E,TB, VU by RW at 5/22/2023 1243    Comment: POC, Role of OT    Acceptance, E,TB, VU by BB at 5/20/2023 1358    Acceptance, E,TB, VU by BB at 5/20/2023 1357                                         User Key       Initials Effective Dates Name Provider Type Discipline     06/16/21 - 05/30/23 Fredi France, OT Occupational Therapist OT    BB 06/16/21 -  Matilde Rios COTA Occupational Therapist Assistant OT    LW 06/16/21 -  Sarah Irby COTA Occupational Therapist Assistant OT     10/19/22 -  Dana Burger, OT Occupational Therapist OT    RW 09/22/22 -  Missy Esteves, OT Occupational Therapist OT    AB 11/07/22 -  Joana Mc LPN Licensed Nurse Nurse                  OT Recommendation and Plan  Planned Therapy Interventions (OT): activity tolerance training, adaptive equipment training, BADL retraining, edema control/reduction, cognitive/visual perception retraining, neuromuscular control/coordination retraining, manual therapy/joint mobilization, IADL retraining, functional balance retraining, occupation/activity based interventions,  ROM/therapeutic exercise, strengthening exercise, passive ROM/stretching, transfer/mobility retraining, patient/caregiver education/training  Therapy Frequency (OT): other (see comments) (5-7 d/wk)  Plan of Care Review  Plan of Care Reviewed With: patient  Outcome Evaluation: OT tx complete, supine in bed, agreeable for EOB thereex, patient has had prosthetic on earlier with PT and had walked with PT early, reports feeling sore several spots of knee with prosthetic wearing. 3 sets x 20 reps of BUE exercises performed EOB. Patient agreeable to perform sit to stands, without prosthetic, and RW. 2 sets x 5 reps performed, patient tolerated well. Offered ADLs, patient deferred as she performed them this morning.  Returned to bed, all needs in reach.     Time Calculation:    Time Calculation- OT       Row Name 06/05/23 1249             Time Calculation- OT    OT Start Time 1215  -      OT Stop Time 1243  -      OT Time Calculation (min) 28 min  -      Total Timed Code Minutes- OT 28 minute(s)  -      OT Received On 06/05/23  -         Timed Charges    53644 - OT Therapeutic Exercise Minutes 28  -SJ         Total Minutes    Timed Charges Total Minutes 28  -       Total Minutes 28  -SJ                User Key  (r) = Recorded By, (t) = Taken By, (c) = Cosigned By      Initials Name Provider Type     Francisco Randall OT Occupational Therapist                  Therapy Charges for Today       Code Description Service Date Service Provider Modifiers Qty    [id number]  OT THER PROC EA 15 MIN 6/5/2023 Francisco Randall OT GO 2                 Francisco Randall OT  6/5/2023

## 2023-06-05 NOTE — PLAN OF CARE
Goal Outcome Evaluation:  Plan of Care Reviewed With: patient     Problem: Adult Inpatient Plan of Care  Goal: Plan of Care Review  Outcome: Ongoing, Progressing  Flowsheets (Taken 6/5/2023 1036)  Progress: improving  Outcome Evaluation: pt responded well to PT with increased gait to 15 ft CGA with RW. pt is mod indep with bed mobility. CGA-min A for t/fs. pt participatd in 2 sets of LE ther ex with increased reps. no new goals met at this time. pt would benefit from short stay on ARU.

## 2023-06-06 LAB
BASOPHILS # BLD AUTO: 0.06 10*3/MM3 (ref 0–0.2)
BASOPHILS NFR BLD AUTO: 0.8 % (ref 0–1.5)
DEPRECATED RDW RBC AUTO: 42.5 FL (ref 37–54)
EOSINOPHIL # BLD AUTO: 0.18 10*3/MM3 (ref 0–0.4)
EOSINOPHIL NFR BLD AUTO: 2.5 % (ref 0.3–6.2)
ERYTHROCYTE [DISTWIDTH] IN BLOOD BY AUTOMATED COUNT: 13.6 % (ref 12.3–15.4)
GLUCOSE BLDC GLUCOMTR-MCNC: 223 MG/DL (ref 70–130)
GLUCOSE BLDC GLUCOMTR-MCNC: 270 MG/DL (ref 70–130)
GLUCOSE BLDC GLUCOMTR-MCNC: 300 MG/DL (ref 70–130)
GLUCOSE BLDC GLUCOMTR-MCNC: 307 MG/DL (ref 70–130)
GLUCOSE BLDC GLUCOMTR-MCNC: 316 MG/DL (ref 70–130)
GLUCOSE BLDC GLUCOMTR-MCNC: 321 MG/DL (ref 70–130)
GLUCOSE BLDC GLUCOMTR-MCNC: 351 MG/DL (ref 70–130)
GLUCOSE BLDC GLUCOMTR-MCNC: 453 MG/DL (ref 70–130)
GLUCOSE BLDC GLUCOMTR-MCNC: 471 MG/DL (ref 70–130)
GLUCOSE BLDC GLUCOMTR-MCNC: 485 MG/DL (ref 70–130)
GLUCOSE BLDC GLUCOMTR-MCNC: 546 MG/DL (ref 70–130)
HCT VFR BLD AUTO: 35.6 % (ref 34–46.6)
HGB BLD-MCNC: 12 G/DL (ref 12–15.9)
HOLD SPECIMEN: NORMAL
IMM GRANULOCYTES # BLD AUTO: 0.02 10*3/MM3 (ref 0–0.05)
IMM GRANULOCYTES NFR BLD AUTO: 0.3 % (ref 0–0.5)
LYMPHOCYTES # BLD AUTO: 1.51 10*3/MM3 (ref 0.7–3.1)
LYMPHOCYTES NFR BLD AUTO: 21 % (ref 19.6–45.3)
MCH RBC QN AUTO: 28.6 PG (ref 26.6–33)
MCHC RBC AUTO-ENTMCNC: 33.7 G/DL (ref 31.5–35.7)
MCV RBC AUTO: 84.8 FL (ref 79–97)
MONOCYTES # BLD AUTO: 0.54 10*3/MM3 (ref 0.1–0.9)
MONOCYTES NFR BLD AUTO: 7.5 % (ref 5–12)
NEUTROPHILS NFR BLD AUTO: 4.89 10*3/MM3 (ref 1.7–7)
NEUTROPHILS NFR BLD AUTO: 67.9 % (ref 42.7–76)
NRBC BLD AUTO-RTO: 0 /100 WBC (ref 0–0.2)
PLATELET # BLD AUTO: 418 10*3/MM3 (ref 140–450)
PMV BLD AUTO: 9.3 FL (ref 6–12)
RBC # BLD AUTO: 4.2 10*6/MM3 (ref 3.77–5.28)
WBC NRBC COR # BLD: 7.2 10*3/MM3 (ref 3.4–10.8)

## 2023-06-06 PROCEDURE — 63710000001 INSULIN DETEMIR PER 5 UNITS: Performed by: HOSPITALIST

## 2023-06-06 PROCEDURE — 97530 THERAPEUTIC ACTIVITIES: CPT

## 2023-06-06 PROCEDURE — 85025 COMPLETE CBC W/AUTO DIFF WBC: CPT | Performed by: HOSPITALIST

## 2023-06-06 PROCEDURE — 63710000001 INSULIN ASPART PER 5 UNITS: Performed by: INTERNAL MEDICINE

## 2023-06-06 PROCEDURE — 97535 SELF CARE MNGMENT TRAINING: CPT

## 2023-06-06 PROCEDURE — 63710000001 ONDANSETRON PER 8 MG: Performed by: UROLOGY

## 2023-06-06 PROCEDURE — 97110 THERAPEUTIC EXERCISES: CPT

## 2023-06-06 PROCEDURE — 82948 REAGENT STRIP/BLOOD GLUCOSE: CPT

## 2023-06-06 PROCEDURE — 25010000002 HEPARIN (PORCINE) PER 1000 UNITS: Performed by: UROLOGY

## 2023-06-06 PROCEDURE — 97116 GAIT TRAINING THERAPY: CPT

## 2023-06-06 PROCEDURE — 25010000002 ONDANSETRON PER 1 MG: Performed by: UROLOGY

## 2023-06-06 PROCEDURE — 63710000001 INSULIN ASPART PER 5 UNITS: Performed by: UROLOGY

## 2023-06-06 PROCEDURE — 63710000001 INSULIN DETEMIR PER 5 UNITS: Performed by: UROLOGY

## 2023-06-06 RX ORDER — INSULIN ASPART 100 [IU]/ML
0-7 INJECTION, SOLUTION INTRAVENOUS; SUBCUTANEOUS
Status: DISCONTINUED | OUTPATIENT
Start: 2023-06-06 | End: 2023-06-08 | Stop reason: HOSPADM

## 2023-06-06 RX ORDER — NICOTINE POLACRILEX 4 MG
15 LOZENGE BUCCAL
Status: DISCONTINUED | OUTPATIENT
Start: 2023-06-06 | End: 2023-06-08 | Stop reason: HOSPADM

## 2023-06-06 RX ORDER — INSULIN ASPART 100 [IU]/ML
12 INJECTION, SOLUTION INTRAVENOUS; SUBCUTANEOUS
Status: DISCONTINUED | OUTPATIENT
Start: 2023-06-06 | End: 2023-06-08 | Stop reason: HOSPADM

## 2023-06-06 RX ORDER — DEXTROSE MONOHYDRATE 25 G/50ML
25 INJECTION, SOLUTION INTRAVENOUS
Status: DISCONTINUED | OUTPATIENT
Start: 2023-06-06 | End: 2023-06-08 | Stop reason: HOSPADM

## 2023-06-06 RX ADMIN — TAMSULOSIN HYDROCHLORIDE 0.4 MG: 0.4 CAPSULE ORAL at 08:59

## 2023-06-06 RX ADMIN — HYDROCHLOROTHIAZIDE 12.5 MG: 12.5 TABLET ORAL at 08:59

## 2023-06-06 RX ADMIN — LEVOTHYROXINE SODIUM 50 MCG: 50 TABLET ORAL at 06:21

## 2023-06-06 RX ADMIN — VENLAFAXINE HYDROCHLORIDE 75 MG: 75 CAPSULE, EXTENDED RELEASE ORAL at 09:00

## 2023-06-06 RX ADMIN — INSULIN ASPART 7 UNITS: 100 INJECTION, SOLUTION INTRAVENOUS; SUBCUTANEOUS at 12:00

## 2023-06-06 RX ADMIN — HEPARIN SODIUM 5000 UNITS: 5000 INJECTION INTRAVENOUS; SUBCUTANEOUS at 06:21

## 2023-06-06 RX ADMIN — Medication 10 ML: at 20:38

## 2023-06-06 RX ADMIN — INSULIN ASPART 12 UNITS: 100 INJECTION, SOLUTION INTRAVENOUS; SUBCUTANEOUS at 12:00

## 2023-06-06 RX ADMIN — HEPARIN SODIUM 5000 UNITS: 5000 INJECTION INTRAVENOUS; SUBCUTANEOUS at 17:30

## 2023-06-06 RX ADMIN — GABAPENTIN 200 MG: 100 CAPSULE ORAL at 20:37

## 2023-06-06 RX ADMIN — CLOPIDOGREL BISULFATE 75 MG: 75 TABLET ORAL at 08:59

## 2023-06-06 RX ADMIN — ATORVASTATIN CALCIUM 80 MG: 40 TABLET, FILM COATED ORAL at 09:00

## 2023-06-06 RX ADMIN — INSULIN ASPART 12 UNITS: 100 INJECTION, SOLUTION INTRAVENOUS; SUBCUTANEOUS at 17:30

## 2023-06-06 RX ADMIN — INSULIN DETEMIR 30 UNITS: 100 INJECTION, SOLUTION SUBCUTANEOUS at 09:02

## 2023-06-06 RX ADMIN — SUCRALFATE 1 G: 1 TABLET ORAL at 17:30

## 2023-06-06 RX ADMIN — AMLODIPINE BESYLATE 5 MG: 5 TABLET ORAL at 08:59

## 2023-06-06 RX ADMIN — HYDROCODONE BITARTRATE AND ACETAMINOPHEN 1 TABLET: 5; 325 TABLET ORAL at 20:37

## 2023-06-06 RX ADMIN — LORAZEPAM 0.5 MG: 0.5 TABLET ORAL at 09:00

## 2023-06-06 RX ADMIN — FUROSEMIDE 40 MG: 40 TABLET ORAL at 09:00

## 2023-06-06 RX ADMIN — PANTOPRAZOLE SODIUM 40 MG: 40 TABLET, DELAYED RELEASE ORAL at 09:00

## 2023-06-06 RX ADMIN — Medication 10 ML: at 09:00

## 2023-06-06 RX ADMIN — RANOLAZINE 500 MG: 500 TABLET, FILM COATED, EXTENDED RELEASE ORAL at 20:36

## 2023-06-06 RX ADMIN — ONDANSETRON 4 MG: 2 INJECTION INTRAMUSCULAR; INTRAVENOUS at 17:30

## 2023-06-06 RX ADMIN — FOLIC ACID 1000 MCG: 1 TABLET ORAL at 08:59

## 2023-06-06 RX ADMIN — HYDROCODONE BITARTRATE AND ACETAMINOPHEN 1 TABLET: 5; 325 TABLET ORAL at 09:00

## 2023-06-06 RX ADMIN — DOCUSATE SODIUM 50 MG AND SENNOSIDES 8.6 MG 2 TABLET: 8.6; 5 TABLET, FILM COATED ORAL at 08:59

## 2023-06-06 RX ADMIN — INSULIN ASPART 10 UNITS: 100 INJECTION, SOLUTION INTRAVENOUS; SUBCUTANEOUS at 09:00

## 2023-06-06 RX ADMIN — HEPARIN SODIUM 5000 UNITS: 5000 INJECTION INTRAVENOUS; SUBCUTANEOUS at 20:36

## 2023-06-06 RX ADMIN — Medication 10 ML: at 20:36

## 2023-06-06 RX ADMIN — ASPIRIN 325 MG: 325 TABLET, COATED ORAL at 08:59

## 2023-06-06 RX ADMIN — GABAPENTIN 200 MG: 100 CAPSULE ORAL at 09:00

## 2023-06-06 RX ADMIN — Medication 1 APPLICATION: at 20:37

## 2023-06-06 RX ADMIN — ISOSORBIDE MONONITRATE 30 MG: 30 TABLET, EXTENDED RELEASE ORAL at 08:59

## 2023-06-06 RX ADMIN — INSULIN ASPART 3 UNITS: 100 INJECTION, SOLUTION INTRAVENOUS; SUBCUTANEOUS at 17:32

## 2023-06-06 RX ADMIN — ONDANSETRON HYDROCHLORIDE 4 MG: 4 TABLET, FILM COATED ORAL at 09:03

## 2023-06-06 RX ADMIN — INSULIN DETEMIR 45 UNITS: 100 INJECTION, SOLUTION SUBCUTANEOUS at 20:36

## 2023-06-06 RX ADMIN — ARIPIPRAZOLE 5 MG: 5 TABLET ORAL at 08:59

## 2023-06-06 RX ADMIN — Medication 1 APPLICATION: at 09:02

## 2023-06-06 RX ADMIN — SUCRALFATE 1 G: 1 TABLET ORAL at 09:00

## 2023-06-06 RX ADMIN — RANOLAZINE 500 MG: 500 TABLET, FILM COATED, EXTENDED RELEASE ORAL at 09:00

## 2023-06-06 RX ADMIN — SUCRALFATE 1 G: 1 TABLET ORAL at 20:37

## 2023-06-06 NOTE — THERAPY TREATMENT NOTE
Acute Care - Physical Therapy Treatment Note  [facility]     Patient Name: Ariana Martinez  : 1960  MRN: [id number]  Today's Date: 2023      Visit Dx:     ICD-10-CM ICD-9-CM   1. Sepsis without acute organ dysfunction, due to unspecified organism  A41.9 038.9     995.91   2. Cellulitis of right lower extremity  L03.115 682.6   3. Type 2 diabetes mellitus with hyperglycemia, unspecified whether long term insulin use  E11.65 250.00   4. Impaired mobility and activities of daily living  Z74.09 V49.89    Z78.9    5. Impaired physical mobility  Z74.09 781.99   6. Impaired mobility and ADLs  Z74.09 V49.89    Z78.9    7. Urinary retention  R33.9 788.20     Patient Active Problem List   Diagnosis    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, with long-term current use of insulin    Closed nondisplaced fracture of fifth left metatarsal bone    MAURICIO (generalized anxiety disorder)    Depression, major, recurrent, moderate (HCC)    GERD without esophagitis    Long term prescription opiate use    Mixed hyperlipidemia    Vitamin D deficiency    Seasonal allergic rhinitis    Restrictive lung disease secondary to obesity    Adult body mass index 37.0-37.9    Snoring    Class 3 severe obesity due to excess calories without serious comorbidity with body mass index (BMI) of 40.0 to 44.9 in adult (HCC)    (HFpEF) heart failure with preserved ejection fraction    Type 2 diabetes mellitus with hyperglycemia, with long-term current use of insulin (HCC)    Cyanocobalamin deficiency    Coronary artery disease of native artery of native heart with stable angina pectoris    Hypertension    Meniere's disease    Gastroparesis    Pulmonary hypertension (HCC)    Pes cavus    Primary osteoarthritis involving multiple joints    Generalized anxiety disorder    Chronic right-sided low back pain with right-sided sciatica    Chest pain    Adverse effect of iron    Chest pain due to myocardial ischemia     Nonrheumatic tricuspid valve regurgitation    Iron deficiency anemia due to chronic blood loss    Malaise and fatigue    Ankle arthritis    Urinary retention    Endocarditis    Essential hypertension    Occlusion and stenosis of bilateral carotid arteries    S/P BKA (below knee amputation) unilateral, left (HCC)    Phantom pain after amputation of lower extremity    S/P CABG (coronary artery bypass graft)    Severe malnutrition    TIA (transient ischemic attack)    Coronary artery abnormality    Elevated d-dimer    Neuropathy    Chest pain, unspecified type    Acute pain of right shoulder    Rotator cuff syndrome, right    Acquired hypothyroidism    Bilateral leg edema    Left elbow pain    Gastritis    Olecranon bursitis, left elbow    Sepsis without acute organ dysfunction, due to unspecified organism     Past Medical History:   Diagnosis Date    Acute bacterial endocarditis 3/13/2021    Angina, class IV     Anxiety     Anxiety and depression     Arthritis     Benign paroxysmal positional vertigo     Bladder disorder     has bladder stimulator    Carpal tunnel syndrome     CHF (congestive heart failure)     Chronic pain     Coronary atherosclerosis     hx CABG 2005.  is followed by Dr Cher Amaya     Diabetes mellitus     Type 2, controlled    Diabetic polyneuropathy     Disease of thyroid gland     Elevated cholesterol     Female stress incontinence     Foot pain, left     Full dentures     Gastroparesis     GERD (gastroesophageal reflux disease)     Hyperlipidemia     Hypertension     Low back pain     Malaise and fatigue     Multiple joint pain     Obesity     Refuses to be weighed    Occlusion and stenosis of bilateral carotid arteries 6/18/2021    Otalgia     Both    Perforation of tympanic membrane     Left    Postoperative wound infection     Vitamin D deficiency     Wears glasses     reading     Past Surgical History:   Procedure Laterality Date    ABDOMINAL SURGERY      AMPUTATION FOOT       ANGIOPLASTY      coronary    ANKLE ARTHROSCOPY Left 02/26/2021    Procedure: Left foot hardware removal, ankle arthroscopy, bone grafting , left foot exostectomy;  Surgeon: Ignacio Lord DPM;  Location: [facility] OR;  Service: Podiatry;  Laterality: Left;    BREAST BIOPSY Right     CARDIAC CATHETERIZATION      CARDIAC CATHETERIZATION N/A 06/20/2017    Procedure: Right Heart Cath;  Surgeon: Can Kwon MD PhD;  Location: [facility] CATH INVASIVE LOCATION;  Service:     CARDIAC CATHETERIZATION N/A 02/18/2020    Procedure: Left Heart Cath;  Surgeon: Catalina Cooper MD;  Location: [facility] CATH INVASIVE LOCATION;  Service: Cardiology;  Laterality: N/A;    CARDIAC CATHETERIZATION N/A 04/06/2022    Procedure: Left Heart Cath;  Surgeon: Sheryl Navas MD;  Location: [facility] CATH INVASIVE LOCATION;  Service: Cardiology;  Laterality: N/A;    CARPAL TUNNEL RELEASE      CHOLECYSTECTOMY      COLONOSCOPY N/A 06/24/2020    Procedure: COLONOSCOPY;  Surgeon: Julián Maldonado MD;  Location: [facility] ENDOSCOPY;  Service: Gastroenterology;  Laterality: N/A;    CORONARY ARTERY BYPASS GRAFT  2005    ENDOSCOPY N/A 10/19/2018    Procedure: ESOPHAGOGASTRODUODENOSCOPY possible dilation;  Surgeon: Julián Maldonado MD;  Location: [facility] ENDOSCOPY;  Service: Gastroenterology    ENDOSCOPY N/A 06/24/2020    Procedure: ESOPHAGOGASTRODUODENOSCOPY WED appt please;  Surgeon: Julián Maldonado MD;  Location: [facility] ENDOSCOPY;  Service: Gastroenterology;  Laterality: N/A;    ENDOSCOPY N/A 06/10/2022    Procedure: ESOPHAGOGASTRODUODENOSCOPY   room 380;  Surgeon: Jeremiah Wilkins MD;  Location: [facility] ENDOSCOPY;  Service: Gastroenterology;  Laterality: N/A;    ENDOSCOPY N/A 1/10/2023    Procedure: ESOPHAGOGASTRODUODENOSCOPY;  Surgeon: Jeremiah Wilkins MD;  Location: [facility] ENDOSCOPY;  Service: Gastroenterology;  Laterality: N/A;    ENDOSCOPY AND COLONOSCOPY      FOOT SURGERY      Toes    FOOT SURGERY      GASTRIC BANDING      Revision,  laparoscopic    HYSTERECTOMY      INCISION AND DRAINAGE LEG Left 03/12/2021    Procedure: Left ankle arthroscopic irrigation and debridement, screw removal;  Surgeon: Ignacio Lord DPM;  Location: [facility];  Service: Podiatry;  Laterality: Left;    MOUTH SURGERY      SALPINGO OOPHORECTOMY      SHOULDER SURGERY      SUBTALAR ARTHRODESIS Left 01/16/2019    Procedure: LEFT FOOT HARDWARE REMOVAL, FIFTH METATARSAL , OPEN REDUCTION INTERNAL FIXATION, CALCANEAL OSTEOTOMY;  Surgeon: Ignacio Lord DPM;  Location: [facility];  Service: Podiatry    SUBTALAR ARTHRODESIS Left 10/16/2019    Procedure: foot hardware removal, subtalar joint fusion  possible de/reattachment of achilles tendon        (c-arm);  Surgeon: Ignacio Lord DPM;  Location: [facility];  Service: Podiatry    SUBTALAR ARTHRODESIS Left 09/30/2020    Procedure: subtalar, talonavicular joint arthrodesis.  Removal hardware.          (c-arm);  Surgeon: Ignacio Lord DPM;  Location: [facility];  Service: Podiatry;  Laterality: Left;    TRANSESOPHAGEAL ECHOCARDIOGRAM (LAMONTE)      With color flow     PT Assessment (last 12 hours)       PT Evaluation and Treatment       Row Name 06/06/23 1249          Physical Therapy Time and Intention    Document Type therapy note (daily note)  -AME     Mode of Treatment physical therapy;individual therapy  -AME     Patient Effort good  -     Comment --  -       Row Name 06/06/23 1249          General Information    Patient Profile Reviewed yes  -AME     Existing Precautions/Restrictions fall  -       Row Name 06/06/23 1249          Pain    Pretreatment Pain Rating 4/10  -AME     Posttreatment Pain Rating 4/10  -AME     Pain Location - Side/Orientation Right  -     Pain Location lower  -     Pain Location - extremity  -       Row Name 06/06/23 1249          Cognition    Affect/Mental Status (Cognition) L  -     Orientation Status (Cognition) oriented x 4  -AME     Follows Commands (Cognition) L  -AME      Personal Safety Interventions fall prevention program maintained;gait belt;muscle strengthening facilitated;nonskid shoes/slippers when out of bed;supervised activity  -       Row Name 06/06/23 1249          Bed Mobility    Bed Mobility supine-sit;sit-supine  -     Scooting/Bridging [place] (Bed Mobility) --  -AME     Supine-Sit [place] (Bed Mobility) minimum assist (75% patient effort)  -     Sit-Supine [place] (Bed Mobility) contact guard  -     Assistive Device (Bed Mobility) --  -     Comment, (Bed Mobility) HOB flat. no bed rails.  -       Row Name 06/06/23 1249          Transfers    Transfers sit-stand transfer;stand-sit transfer  -       Row Name 06/06/23 1249          Bed-Chair Transfer    Bed-Chair [place] (Transfers) contact guard  -     Assistive Device (Bed-Chair Transfers) walker, front-wheeled  -       Row Name 06/06/23 1249          Sit-Stand Transfer    Sit-Stand [place] (Transfers) contact guard  -     Assistive Device (Sit-Stand Transfers) walker, front-wheeled  -       Row Name 06/06/23 1249          Stand-Sit Transfer    Stand-Sit [place] (Transfers) contact guard  -     Assistive Device (Stand-Sit Transfers) walker, front-wheeled  -       Row Name 06/06/23 1249          Gait/Stairs (Locomotion)    Gait/Stairs Locomotion gait/ambulation independence;gait/ambulation assistive device;gait pattern;gait deviations;distance ambulated;maintains weight-bearing status;stairs negotiation  -     [place] Level (Gait) contact guard  -     Assistive Device (Gait) walker, front-wheeled  -     Distance in Feet (Gait) 24 ft x2  -     Deviations/Abnormal Patterns (Gait) luis decreased;gait speed decreased;stride length decreased  -       Row Name 06/06/23 1249          Motor Skills    Therapeutic Exercise --  hip 3-way in supine and SL. AROM. 30x1 arabella. single-leg bridging, crunches- 25-30x1  -       Row Name             Wound 05/17/23 1826  Right anterior fifth toe    Wound - Properties Group Placement Date: 05/17/23  - Placement Time: 1828 -JH Side: Right  -JH Orientation: anterior  -JH Location: fifth toe  -JH    Retired Wound - Properties Group Placement Date: 05/17/23  -JH Placement Time: 1828 -JH Side: Right  -JH Orientation: anterior  -JH Location: fifth toe  -JH    Retired Wound - Properties Group Date first assessed: 05/17/23  -JH Time first assessed: 1828  -JH Side: Right  -JH Location: fifth toe  -JH      Row Name             Wound 05/23/23 1006 Right distal leg    Wound - Properties Group Placement Date: 05/23/23  - Placement Time: 1006  -LH Present on Hospital Admission: N  -LH Side: Right  -LH Orientation: distal  -LH Location: leg  -LH    Retired Wound - Properties Group Placement Date: 05/23/23  - Placement Time: 1006  -LH Present on Hospital Admission: N  -LH Side: Right  -LH Orientation: distal  -LH Location: leg  -LH    Retired Wound - Properties Group Date first assessed: 05/23/23  - Time first assessed: 1006  -LH Present on Hospital Admission: N  -LH Side: Right  -LH Location: leg  -LH      Row Name 06/06/23 1249          Vital Signs    Pre Systolic BP Rehab 115  -AME     Pre Treatment Diastolic BP 49  -AME     Post Systolic BP Rehab 121  -AME     Post Treatment Diastolic BP 48  -AME     Pretreatment Heart Rate (beats/min) 74  -AME     Posttreatment Heart Rate (beats/min) 83  -AME     Pre SpO2 (%) 93  -AME     O2 Delivery Pre Treatment nasal cannula  -AME     Post SpO2 (%) 83  -AME     O2 Delivery Post Treatment nasal cannula  -AME     Pre Patient Position Supine  -AME     Post Patient Position Supine  -AME       Row Name 06/06/23 1249          Positioning and Restraints    Pre-Treatment Position in bed  -AME     Post Treatment Position bed  -AME     In Bed fowlers;call light within reach;encouraged to call for assist;exit alarm on  all needs met.  -AME       Row Name 06/06/23 1249          Therapy Assessment/Plan (PT)    Rehab Potential  (PT) good, to achieve stated therapy goals  -       Row Name 06/06/23 1249          Bed Mobility Goal 1 (PT)    Activity/Assistive Device (Bed Mobility Goal 1, PT) bed mobility activities, all  -AME     [place] Level/Cues Needed (Bed Mobility Goal 1, PT) standby assist;independent  -AME     Time Frame (Bed Mobility Goal 1, PT) by discharge  -AME     Progress/Outcomes (Bed Mobility Goal 1, PT) goal met   -       Row Name 06/06/23 1249          Transfer Goal 1 (PT)    Activity/Assistive Device (Transfer Goal 1, PT) sit-to-stand/stand-to-sit;bed-to-chair/chair-to-bed  -AME     [place] Level/Cues Needed (Transfer Goal 1, PT) contact guard required;standby assist;modified independence  -AME     Time Frame (Transfer Goal 1, PT) by discharge  -AME     Progress/Outcome (Transfer Goal 1, PT) goal not met  -       Row Name 06/06/23 1249          Gait Training Goal 1 (PT)    Activity/Assistive Device (Gait Training Goal 1, PT) gait (walking locomotion);decrease fall risk  -AME     [place] Level (Gait Training Goal 1, PT) contact guard required;standby assist;modified independence  -AME     Distance (Gait Training Goal 1, PT) 50ft or more each trip  -AME     Time Frame (Gait Training Goal 1, PT) 1 week  -AME     Progress/Outcome (Gait Training Goal 1, PT) goal not met  -       Row Name 06/06/23 1249          ROM Goal 1 (PT)    ROM Goal 1 (PT) Pt will tolerate LE exercises OOB in chair with VSS  -AME     Time Frame (ROM Goal 1, PT) by discharge  -AME     Progress/Outcome (ROM Goal 1, PT) goal met   -               User Key  (r) = Recorded By, (t) = Taken By, (c) = Cosigned By      Initials Name Provider Type    AME Ousmane Zhang, PTA Physical Therapist Assistant    Juany Sage, RN Registered Nurse    Janae Severino LPN Licensed Nurse                    Physical Therapy Education       Title: PT OT SLP Therapies (In Progress)       Topic: Physical Therapy (In Progress)       Point: Mobility training (In  Progress)       Learning Progress Summary             Patient Acceptance, E, NR by AME at 6/6/2023 1314    Acceptance, E, NR by AME at 6/5/2023 1036    Acceptance, E,TB, VU by LW at 6/4/2023 1458    Acceptance, E, VU by AB at 6/1/2023 0535    Acceptance, E, VU by AB at 5/31/2023 0519    Acceptance, E, NR by AME at 5/29/2023 1123    Acceptance, E, NR by AME at 5/26/2023 1356    Acceptance, E,TB, VU by LW at 5/25/2023 1459    Acceptance, E, NR by AME at 5/24/2023 1306    Acceptance, E,TB, VU by NB at 5/18/2023 2138    Acceptance, E, NR by JC1 at 5/18/2023 1407                         Point: Home exercise program (Done)       Learning Progress Summary             Patient Acceptance, E,TB, VU by LW at 6/4/2023 1458    Acceptance, E, VU by AB at 6/1/2023 0535    Acceptance, E, VU by AB at 5/31/2023 0519    Acceptance, E, VU by AB at 5/30/2023 0516    Acceptance, E,TB, VU by LW at 5/25/2023 1459                         Point: Body mechanics (Done)       Learning Progress Summary             Patient Acceptance, E,TB, VU by LW at 6/4/2023 1458    Acceptance, E, VU by AB at 6/1/2023 0535    Acceptance, E, VU by AB at 5/31/2023 0519    Acceptance, E, VU by AB at 5/30/2023 0516    Acceptance, E,TB, VU by LW at 5/25/2023 1459    Acceptance, E,TB, VU by JH at 5/25/2023 1451                         Point: Precautions (Done)       Learning Progress Summary             Patient Acceptance, E,TB, VU by LW at 6/4/2023 1458    Acceptance, E, VU by AB at 6/1/2023 0535    Acceptance, E, VU by AB at 5/31/2023 0519    Acceptance, E, VU by AB at 5/30/2023 0516    Acceptance, E,TB, VU by LW at 5/25/2023 1459    Acceptance, E, NR by JC1 at 5/18/2023 1407                                         User Key       Initials Effective Dates Name Provider Type Discipline    JC1 06/16/21 -  Aure Villaseñor, PT Physical Therapist PT    AME 06/16/21 -  Ousmane Zhang, PTA Physical Therapist Assistant PT    LW 06/16/21 -  Sarah Irby COTA Occupational  Therapist Assistant OT    NB 06/16/21 -  Samantha Cuellar RN Registered Nurse Nurse     09/08/22 -  Janae Amaya LPN Licensed Nurse Nurse    AB 11/07/22 -  Joana Mc LPN Licensed Nurse Nurse                  PT Recommendation and Plan  Anticipated Discharge Disposition (PT): inpatient rehabilitation facility  Therapy Frequency (PT): other (see comments) (5-7 days/wk)  Plan of Care Reviewed With: patient  Progress: improving  Outcome Evaluation: pt responded well to PT with increased gait to 24 ft CGA with RW. pt requires min A for bed mobility with HOB flat and no bed rails. pt participated in LE and core strengthening. no new goals met at this time. pt would continue to benefit from PT services.   Outcome Measures       Row Name 06/06/23 1249 06/05/23 0958          How much help from another person do you currently need...    Turning from your back to your side while in flat bed without using bedrails? 3  -AME 3  -AME     Moving from lying on back to sitting on the side of a flat bed without bedrails? 3  -AME 3  -AME     Moving to and from a bed to a chair (including a wheelchair)? 3  -AME 3  -AME     Standing up from a chair using your arms (e.g., wheelchair, bedside chair)? 3  -AME 3  -AME     Climbing 3-5 steps with a railing? 2  -AME 1  -AME     To walk in hospital room? 3  -AME 3  -AME     AM-PAC 6 Clicks Score (PT) 17  -AME 16  -AME        Functional Assessment    Outcome Measure Options AM-PAC 6 Clicks Basic Mobility (PT)  -AME AM-PAC 6 Clicks Basic Mobility (PT)  -AME               User Key  (r) = Recorded By, (t) = Taken By, (c) = Cosigned By      Initials Name Provider Type    Ousmane Gomes, PTA Physical Therapist Assistant                     Time Calculation:    PT Charges       Row Name 06/06/23 1328             Time Calculation    Start Time 1249  -AME      Stop Time 1328  -AME      Time Calculation (min) 39 min  -AME         Time Calculation- PT    Total Timed Code Minutes- PT 29 minute(s)  -AME          Timed Charges    62868 - PT Therapeutic Exercise Minutes 16  -AME      05042 - Gait Training Minutes  15  -AME      84877 - PT Therapeutic Activity Minutes 8  -AME         Total Minutes    Timed Charges Total Minutes 39  -AME       Total Minutes 39  -AME                User Key  (r) = Recorded By, (t) = Taken By, (c) = Cosigned By      Initials Name Provider Type    Ousmane Gomes PTA Physical Therapist Assistant                  Therapy Charges for Today       Code Description Service Date Service Provider Modifiers Qty    00620886414 HC PT THER PROC EA 15 MIN 6/5/2023 Ousmane Zhang, PTA GP 2    90239534450 HC PT THERAPEUTIC ACT EA 15 MIN 6/5/2023 Ousmane Zhang, PTA GP 1    64110361536 HC PT THER PROC EA 15 MIN 6/6/2023 Ousmane Zhang, PTA GP 1    44434719107 HC GAIT TRAINING EA 15 MIN 6/6/2023 Ousmane Zhang, PTA GP 1    92795829411 HC PT THERAPEUTIC ACT EA 15 MIN 6/6/2023 Ousmane Zhang, PTA GP 1            PT G-Codes  Outcome Measure Options: AM-PAC 6 Clicks Basic Mobility (PT)  AM-PAC 6 Clicks Score (PT): 17  AM-PAC 6 Clicks Score (OT): 21    Ousmane Zhang PTA  6/6/2023

## 2023-06-06 NOTE — THERAPY TREATMENT NOTE
Patient Name: Ariana Martinez  : 1960    MRN: [id number]                              Today's Date: 2023       Admit Date: 2023    Visit Dx:     ICD-10-CM ICD-9-CM   1. Sepsis without acute organ dysfunction, due to unspecified organism  A41.9 038.9     995.91   2. Cellulitis of right lower extremity  L03.115 682.6   3. Type 2 diabetes mellitus with hyperglycemia, unspecified whether long term insulin use  E11.65 250.00   4. Impaired mobility and activities of daily living  Z74.09 V49.89    Z78.9    5. Impaired physical mobility  Z74.09 781.99   6. Impaired mobility and ADLs  Z74.09 V49.89    Z78.9    7. Urinary retention  R33.9 788.20     Patient Active Problem List   Diagnosis    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, with long-term current use of insulin    Closed nondisplaced fracture of fifth left metatarsal bone    MAURICIO (generalized anxiety disorder)    Depression, major, recurrent, moderate (HCC)    GERD without esophagitis    Long term prescription opiate use    Mixed hyperlipidemia    Vitamin D deficiency    Seasonal allergic rhinitis    Restrictive lung disease secondary to obesity    Adult body mass index 37.0-37.9    Snoring    Class 3 severe obesity due to excess calories without serious comorbidity with body mass index (BMI) of 40.0 to 44.9 in adult (HCC)    (HFpEF) heart failure with preserved ejection fraction    Type 2 diabetes mellitus with hyperglycemia, with long-term current use of insulin (HCC)    Cyanocobalamin deficiency    Coronary artery disease of native artery of native heart with stable angina pectoris    Hypertension    Meniere's disease    Gastroparesis    Pulmonary hypertension (HCC)    Pes cavus    Primary osteoarthritis involving multiple joints    Generalized anxiety disorder    Chronic right-sided low back pain with right-sided sciatica    Chest pain    Adverse effect of iron    Chest pain due to myocardial ischemia    Nonrheumatic  tricuspid valve regurgitation    Iron deficiency anemia due to chronic blood loss    Malaise and fatigue    Ankle arthritis    Urinary retention    Endocarditis    Essential hypertension    Occlusion and stenosis of bilateral carotid arteries    S/P BKA (below knee amputation) unilateral, left (HCC)    Phantom pain after amputation of lower extremity    S/P CABG (coronary artery bypass graft)    Severe malnutrition    TIA (transient ischemic attack)    Coronary artery abnormality    Elevated d-dimer    Neuropathy    Chest pain, unspecified type    Acute pain of right shoulder    Rotator cuff syndrome, right    Acquired hypothyroidism    Bilateral leg edema    Left elbow pain    Gastritis    Olecranon bursitis, left elbow    Sepsis without acute organ dysfunction, due to unspecified organism     Past Medical History:   Diagnosis Date    Acute bacterial endocarditis 3/13/2021    Angina, class IV     Anxiety     Anxiety and depression     Arthritis     Benign paroxysmal positional vertigo     Bladder disorder     has bladder stimulator    Carpal tunnel syndrome     CHF (congestive heart failure)     Chronic pain     Coronary atherosclerosis     hx CABG 2005.  is followed by Dr Cher Amaya     Diabetes mellitus     Type 2, controlled    Diabetic polyneuropathy     Disease of thyroid gland     Elevated cholesterol     Female stress incontinence     Foot pain, left     Full dentures     Gastroparesis     GERD (gastroesophageal reflux disease)     Hyperlipidemia     Hypertension     Low back pain     Malaise and fatigue     Multiple joint pain     Obesity     Refuses to be weighed    Occlusion and stenosis of bilateral carotid arteries 6/18/2021    Otalgia     Both    Perforation of tympanic membrane     Left    Postoperative wound infection     Vitamin D deficiency     Wears glasses     reading     Past Surgical History:   Procedure Laterality Date    ABDOMINAL SURGERY      AMPUTATION FOOT      ANGIOPLASTY       coronary    ANKLE ARTHROSCOPY Left 02/26/2021    Procedure: Left foot hardware removal, ankle arthroscopy, bone grafting , left foot exostectomy;  Surgeon: Ignacio Lord DPM;  Location: [facility] OR;  Service: Podiatry;  Laterality: Left;    BREAST BIOPSY Right     CARDIAC CATHETERIZATION      CARDIAC CATHETERIZATION N/A 06/20/2017    Procedure: Right Heart Cath;  Surgeon: Can Kwon MD PhD;  Location: [facility] CATH INVASIVE LOCATION;  Service:     CARDIAC CATHETERIZATION N/A 02/18/2020    Procedure: Left Heart Cath;  Surgeon: Catalina Cooper MD;  Location: [facility] CATH INVASIVE LOCATION;  Service: Cardiology;  Laterality: N/A;    CARDIAC CATHETERIZATION N/A 04/06/2022    Procedure: Left Heart Cath;  Surgeon: Sheryl Nvaas MD;  Location: [facility] CATH INVASIVE LOCATION;  Service: Cardiology;  Laterality: N/A;    CARPAL TUNNEL RELEASE      CHOLECYSTECTOMY      COLONOSCOPY N/A 06/24/2020    Procedure: COLONOSCOPY;  Surgeon: Julián Maldonado MD;  Location: [facility] ENDOSCOPY;  Service: Gastroenterology;  Laterality: N/A;    CORONARY ARTERY BYPASS GRAFT  2005    ENDOSCOPY N/A 10/19/2018    Procedure: ESOPHAGOGASTRODUODENOSCOPY possible dilation;  Surgeon: Julián Maldonado MD;  Location: [facility] ENDOSCOPY;  Service: Gastroenterology    ENDOSCOPY N/A 06/24/2020    Procedure: ESOPHAGOGASTRODUODENOSCOPY WED appt please;  Surgeon: Julián Maldonado MD;  Location: [facility] ENDOSCOPY;  Service: Gastroenterology;  Laterality: N/A;    ENDOSCOPY N/A 06/10/2022    Procedure: ESOPHAGOGASTRODUODENOSCOPY   room 380;  Surgeon: Jeremiah Wilkins MD;  Location: [facility] ENDOSCOPY;  Service: Gastroenterology;  Laterality: N/A;    ENDOSCOPY N/A 1/10/2023    Procedure: ESOPHAGOGASTRODUODENOSCOPY;  Surgeon: Jeremiah Wilkins MD;  Location: [facility] ENDOSCOPY;  Service: Gastroenterology;  Laterality: N/A;    ENDOSCOPY AND COLONOSCOPY      FOOT SURGERY      Toes    FOOT SURGERY      GASTRIC BANDING      Revision, laparoscopic     HYSTERECTOMY      INCISION AND DRAINAGE LEG Left 03/12/2021    Procedure: Left ankle arthroscopic irrigation and debridement, screw removal;  Surgeon: Ignacio Lord DPM;  Location: [facility];  Service: Podiatry;  Laterality: Left;    MOUTH SURGERY      SALPINGO OOPHORECTOMY      SHOULDER SURGERY      SUBTALAR ARTHRODESIS Left 01/16/2019    Procedure: LEFT FOOT HARDWARE REMOVAL, FIFTH METATARSAL , OPEN REDUCTION INTERNAL FIXATION, CALCANEAL OSTEOTOMY;  Surgeon: Ignacio Lord DPM;  Location: [facility] OR;  Service: Podiatry    SUBTALAR ARTHRODESIS Left 10/16/2019    Procedure: foot hardware removal, subtalar joint fusion  possible de/reattachment of achilles tendon        (c-arm);  Surgeon: Ignacio Lord DPM;  Location: [facility];  Service: Podiatry    SUBTALAR ARTHRODESIS Left 09/30/2020    Procedure: subtalar, talonavicular joint arthrodesis.  Removal hardware.          (c-arm);  Surgeon: Ignacio Lord DPM;  Location: [facility];  Service: Podiatry;  Laterality: Left;    TRANSESOPHAGEAL ECHOCARDIOGRAM (LAMONTE)      With color flow      General Information       Row Name 06/06/23 0744          OT Time and Intention    Document Type therapy note (daily note)  -     Mode of Treatment occupational therapy  -       Row Name 06/06/23 0744          General Information    Patient Profile Reviewed yes  -     Existing Precautions/Restrictions fall  -       Row Name 06/06/23 0744          Cognition    Orientation Status (Cognition) oriented x 4  -       Row Name 06/06/23 0744          Safety Issues, Functional Mobility    Impairments Affecting Function (Mobility) balance;endurance/activity tolerance;pain  -               User Key  (r) = Recorded By, (t) = Taken By, (c) = Cosigned By      Initials Name Provider Type     Francisco Randall OT Occupational Therapist                     Mobility/ADL's       Row Name 06/06/23 0744          Bed Mobility    Supine-Sit [place] (Bed Mobility) modified  independence  -SJ       Row Name 06/06/23 0744          Transfers    Transfers sit-stand transfer;toilet transfer  -       Row Name 06/06/23 0744          Bed-Chair Transfer    Bed-Chair [place] (Transfers) contact guard  -SJ     Assistive Device (Bed-Chair Transfers) walker, front-wheeled  -SJ       Row Name 06/06/23 0744          Sit-Stand Transfer    Sit-Stand [place] (Transfers) contact guard  -SJ     Assistive Device (Sit-Stand Transfers) walker, front-wheeled  -SJ       Row Name 06/06/23 0744          Stand-Sit Transfer    Stand-Sit [place] (Transfers) contact guard  -SJ     Assistive Device (Stand-Sit Transfers) walker, front-wheeled  -SJ       Row Name 06/06/23 0744          Toilet Transfer    Type (Toilet Transfer) sit-stand;stand-sit  -SJ     [place] Level (Toilet Transfer) contact guard  -SJ     Assistive Device (Toilet Transfer) commode, bedside with drop arms;walker, front-wheeled  -SJ     Comment, (Toilet Transfer) commode over toilet  -       Row Name 06/06/23 0744          Activities of Daily Living    BADL Assessment/Intervention lower body dressing;toileting  -       Row Name 06/06/23 0744          Lower Body Dressing Assessment/Training    [place] Level (Lower Body Dressing) don;socks;shoes/slippers;other (see comments)  prosthetic  -     Position (Lower Body Dressing) edge of bed sitting  -       Row Name 06/06/23 0744          Toileting Assessment/Training    [place] Level (Toileting) contact guard assist  -SJ               User Key  (r) = Recorded By, (t) = Taken By, (c) = Cosigned By      Initials Name Provider Type    Francisco Camarena OT Occupational Therapist                   Obj/Interventions       Row Name 06/06/23 0744          Shoulder (Therapeutic Exercise)    Shoulder Strengthening (Therapeutic Exercise) right;bilateral;flexion;horizontal aBduction/aDduction;extension;3 lb free weight;20 repititions  -       Row Name 06/06/23 0744           Elbow/Forearm (Therapeutic Exercise)    Elbow/Forearm Strengthening (Therapeutic Exercise) bilateral;flexion;extension;3 repetitions;20 repititions  -       Row Name 06/06/23 0744          Wrist (Therapeutic Exercise)    Wrist Strengthening (Therapeutic Exercise) bilateral;flexion;extension;20 repititions;3 sets  -       Row Name 06/06/23 0744          Hand (Therapeutic Exercise)    Hand (Therapeutic Exercise) AROM (active range of motion)  -     Hand AROM/AAROM (Therapeutic Exercise) AROM (active range of motion);right;bilateral;3 sets;20 repititions  -       Row Name 06/06/23 0744          Motor Skills    Therapeutic Exercise shoulder;elbow/forearm;wrist;hand  -               User Key  (r) = Recorded By, (t) = Taken By, (c) = Cosigned By      Initials Name Provider Type     Francisco Randall OT Occupational Therapist                   Goals/Plan    No documentation.                  Clinical Impression       [facility] Name 06/06/23 0744          Pain Assessment    Pretreatment Pain Rating 4/10  -SJ     Posttreatment Pain Rating 4/10  -SJ     Pain Location - Side/Orientation Right  -     Pain Location - foot  -       Row Name 06/06/23 0744          Plan of Care Review    Plan of Care Reviewed With patient  -     Outcome Evaluation OT tx complete, patient alert x4, agreeable for tx. Supine to sit with modified independence. LE dressing with CGA, donning/doffing sock, prosthetic liner, and prosthetic. Sit to stand, toilet t/f (commode over toilet), and functional mobility with CGA and RW. Toileting with CGA. Patient able to perform functional mobility from bathroom to recliner with RW, and prosthetic. 3 sets x 20 reps of BUE exercises 3 lbs dowl. 2 static reps of  standing and weight shifitng performed, Patient tolerated 20 seconds prior to feeling discomfort on L resdiual limb. Prosthetic left on to improve patient tolerance to prosthetic. Patient tolerated session well. All needs in reach. Cont OT POC.   -       Row Name 06/06/23 0744          Therapy Assessment/Plan (OT)    Rehab Potential (OT) good, to achieve stated therapy goals  -     Criteria for Skilled Therapeutic Interventions Met (OT) yes;skilled treatment is necessary  -[facility] Name 06/06/23 0744          Therapy Plan Review/Discharge Plan (OT)    Anticipated Discharge Disposition (OT) inpatient rehabilitation facility;[facility] nursing facility  -       Row Name 06/06/23 0744          Vital Signs    Pre Systolic BP Rehab 130  -SJ     Pre Treatment Diastolic BP 58  -SJ     Pretreatment Heart Rate (beats/min) 78  -SJ     Posttreatment Heart Rate (beats/min) 80  -SJ     Pre SpO2 (%) 99  -SJ     O2 Delivery Pre Treatment nasal cannula  2.5 lpm  -     Post SpO2 (%) 98  -SJ     O2 Delivery Post Treatment room air  -     Pre Patient Position Supine  -SJ     Post Patient Position Sitting  -[facility] Name 06/06/23 0744          Positioning and Restraints    Pre-Treatment Position in bed  -     Post Treatment Position chair  -SJ     In Chair notified nsg;sitting;call light within reach;with nsg  -               User Key  (r) = Recorded By, (t) = Taken By, (c) = Cosigned By      Initials Name Provider Type     Francisco Randall, OT Occupational Therapist                   Outcome Measures       Row Name 06/06/23 0744          How much help from another is currently needed...    Putting on and taking off regular lower body clothing? 3  -SJ     Bathing (including washing, rinsing, and drying) 3  -SJ     Toileting (which includes using toilet bed pan or urinal) 3  -SJ     Putting on and taking off regular upper body clothing 4  -SJ     Taking care of personal grooming (such as brushing teeth) 4  -SJ     Eating meals 4  -SJ     AM-PAC 6 Clicks Score (OT) 21  -       Row Name 06/06/23 0744          Functional Assessment    Outcome Measure Options AM-PAC 6 Clicks Daily Activity (OT)  -               User Key  (r) = Recorded By, (t) = Taken By,  (c) = Cosigned By      Initials Name Provider Type    Francisco Camarena, OT Occupational Therapist                    Occupational Therapy Education       Title: PT OT SLP Therapies (In Progress)       Topic: Occupational Therapy (Done)       Point: ADL training (Done)       Description:   Instruct learner(s) on proper safety adaptation and remediation techniques during self care or transfers.   Instruct in proper use of assistive devices.                  Learning Progress Summary             Patient Acceptance, E,TB, VU by LW at 6/4/2023 1458    Acceptance, E, VU by AB at 6/1/2023 0535    Acceptance, E, VU by AB at 5/31/2023 0519    Acceptance, E,TB, VU by  at 5/30/2023 1133    Comment: OT role, POC, t/f training    Acceptance, E, VU by AB at 5/30/2023 0516    Acceptance, E,TB, VU by LW at 5/26/2023 1516    Acceptance, E,TB, VU by LW at 5/25/2023 1459    Acceptance, E,TB, VU by RW at 5/22/2023 1243    Comment: POC, Role of OT    Acceptance, E,TB, VU by BB at 5/20/2023 1358    Acceptance, E,TB, VU by BB at 5/20/2023 1357                         Point: Home exercise program (Done)       Description:   Instruct learner(s) on appropriate technique for monitoring, assisting and/or progressing therapeutic exercises/activities.                  Learning Progress Summary             Patient Acceptance, E,TB, VU by LW at 6/4/2023 1458    Acceptance, E, VU by AB at 6/1/2023 0535    Acceptance, E, VU by AB at 5/31/2023 0519    Acceptance, E, VU by AB at 5/30/2023 0516    Acceptance, E,TB, VU by LW at 5/26/2023 1516    Acceptance, E,TB, VU by LW at 5/25/2023 1459    Acceptance, E,TB, VU by BB at 5/20/2023 1356                         Point: Precautions (Done)       Description:   Instruct learner(s) on prescribed precautions during self-care and functional transfers.                  Learning Progress Summary             Patient Acceptance, E,TB, VU by LW at 6/4/2023 1458    Acceptance, E, VU by AB at 6/1/2023 0535     Acceptance, E, VU by AB at 5/31/2023 0519    Acceptance, E,TB, VU by  at 5/30/2023 1133    Comment: OT role, POC, t/f training    Acceptance, E, VU by AB at 5/30/2023 0516    Acceptance, E,TB, VU by LW at 5/26/2023 1516    Acceptance, E,TB, VU by LW at 5/25/2023 1459    Acceptance, E,TB, VU by RW at 5/22/2023 1243    Comment: POC, Role of OT    Acceptance, E, DU by RB at 5/18/2023 1352    Comment: Pt edu on use of gait belt and non skid socks when OOB and no OOB without assist.                         Point: Body mechanics (Done)       Description:   Instruct learner(s) on proper positioning and spine alignment during self-care, functional mobility activities and/or exercises.                  Learning Progress Summary             Patient Acceptance, E,TB, VU by LW at 6/4/2023 1458    Acceptance, E, VU by AB at 6/1/2023 0535    Acceptance, E, VU by AB at 5/31/2023 0519    Acceptance, E,TB, VU by  at 5/30/2023 1133    Comment: OT role, POC, t/f training    Acceptance, E, VU by AB at 5/30/2023 0516    Acceptance, E,TB, VU by LW at 5/26/2023 1516    Acceptance, E,TB, VU by  at 5/25/2023 1459    Acceptance, E,TB, VU by RW at 5/22/2023 1243    Comment: POC, Role of OT    Acceptance, E,TB, VU by BB at 5/20/2023 1358    Acceptance, E,TB, VU by BB at 5/20/2023 1357                                         User Key       Initials Effective Dates Name Provider Type Discipline    RB 06/16/21 - 05/30/23 Fredi France, OT Occupational Therapist OT    BB 06/16/21 -  Matilde Rios COTA Occupational Therapist Assistant OT    LW 06/16/21 -  Sarah Irby COTA Occupational Therapist Assistant OT     10/19/22 -  Dana Burger, OT Occupational Therapist OT    RW 09/22/22 -  Missy Esteves, OT Occupational Therapist OT    AB 11/07/22 -  Joana Mc LPN Licensed Nurse Nurse                  OT Recommendation and Plan  Planned Therapy Interventions (OT): activity tolerance training, adaptive equipment  training, BADL retraining, edema control/reduction, cognitive/visual perception retraining, neuromuscular control/coordination retraining, manual therapy/joint mobilization, IADL retraining, functional balance retraining, occupation/activity based interventions, ROM/therapeutic exercise, strengthening exercise, passive ROM/stretching, transfer/mobility retraining, patient/caregiver education/training  Therapy Frequency (OT): other (see comments) (5-7 d/wk)  Plan of Care Review  Plan of Care Reviewed With: patient  Outcome Evaluation: OT tx complete, patient alert x4, agreeable for tx. Supine to sit with modified independence. LE dressing with CGA, donning/doffing sock, prosthetic liner, and prosthetic. Sit to stand, toilet t/f (commode over toilet), and functional mobility with CGA and RW. Toileting with CGA. Patient able to perform functional mobility from bathroom to recliner with RW, and prosthetic. 3 sets x 20 reps of BUE exercises 3 lbs dowl. 2 static reps of  standing and weight shifitng performed, Patient tolerated 20 seconds prior to feeling discomfort on L resdiual limb. Prosthetic left on to improve patient tolerance to prosthetic. Patient tolerated session well. All needs in reach. Cont OT POC.     Time Calculation:    Time Calculation- OT       Row Name 06/06/23 0830             Time Calculation- OT    OT Start Time 0744  -      OT Stop Time 0825  -      OT Time Calculation (min) 41 min  -      Total Timed Code Minutes- OT 41 minute(s)  -      OT Received On 06/06/23  -         Timed Charges    47210 - OT Therapeutic Exercise Minutes 15  -      06178 - OT Therapeutic Activity Minutes 16  -      32990 - OT Self Care/Mgmt Minutes 10  -         Total Minutes    Timed Charges Total Minutes 41  -SJ       Total Minutes 41  -SJ                User Key  (r) = Recorded By, (t) = Taken By, (c) = Cosigned By      Initials Name Provider Type     Francisco Randall, OT Occupational Therapist                   Therapy Charges for Today       Code Description Service Date Service Provider Modifiers Qty    05578492858 HC OT THER PROC EA 15 MIN 6/5/2023 Francisco Randall OT GO 2    87464760219 HC OT THER PROC EA 15 MIN 6/6/2023 Francisco Randall OT GO 1    44564200063 HC OT THERAPEUTIC ACT EA 15 MIN 6/6/2023 Francisco Randall OT GO 1    34406022125 HC OT SELF CARE/MGMT/TRAIN EA 15 MIN 6/6/2023 Francisco Randall OT GO 1                 Francisco Randall OT  6/6/2023

## 2023-06-06 NOTE — PLAN OF CARE
Goal Outcome Evaluation:  Plan of Care Reviewed With: patient        Progress: improving  Outcome Evaluation: VSS, resting well, no new complaints at this time

## 2023-06-06 NOTE — PROGRESS NOTES
[facility] Medicine Services  INPATIENT PROGRESS NOTE    Length of Stay: 20  Date of Admission: 5/17/2023  Primary Care Physician: Dolly Foss APRN    Subjective   (S) Admitted for cough, fever, chills and diagnosed with Right lower extremity cellulitis  Today, she has no complains; if every place denies her stay, she is willing to go back home with PT and OT    Review of Systems   All other systems reviewed and are negative.   All pertinent negatives and positives are as above. All other systems have been reviewed and are negative unless otherwise stated.     Prior to Admission medications    Medication Sig Start Date End Date Taking? Authorizing Provider   ARIPiprazole (ABILIFY) 5 MG tablet Take 1 tablet by mouth Daily. 3/10/23  Yes Dolly Foss APRN   aspirin  MG tablet Take 1 tablet by mouth Daily. 2/8/22  Yes Mahin Esteves MD   atorvastatin (LIPITOR) 80 MG tablet Take 1 tablet by mouth Daily. 9/7/22  Yes Dolly Foss APRN   butalbital-acetaminophen-caffeine (FIORICET, ESGIC) -40 MG per tablet Take one to two tablets by mouth per headache episode as needed. 4/21/23  Yes Dolly Foss APRN   Calcium Citrate-Vitamin D 250-200 MG-UNIT tablet Take 2 tablets by mouth 2 (two) times a day.   Yes ProviderEsther MD   clopidogrel (PLAVIX) 75 MG tablet Take 1 tablet by mouth Daily. 3/28/23  Yes Dolly Foss APRN   cyanocobalamin 1000 MCG/ML injection INJECT 1 ML INTO THE APPROPRIATE MUSCLE AS DIRECTED BY PRESCRIBER EVERY 28 (TWENTY-EIGHT) DAYS. 4/7/23  Yes Pranav Sutton MD   famotidine (PEPCID) 40 MG tablet Take 1 tablet by mouth Daily. 4/14/23  Yes ProviderEsther MD   fluticasone (FLONASE) 50 MCG/ACT nasal spray INSTILL 2 SPRAYS IN EACH NOSTRIL AS DIRECTED BY PROVIDER DAILY  Patient taking differently: As Needed. 2/1/23  Yes Dolly Foss APRN   folic acid (FOLVITE) 1 MG tablet TAKE 1 TABLET BY MOUTH EVERY DAY 4/18/23  Yes  Teressa Hammond APRN   furosemide (LASIX) 40 MG tablet TAKE 1 TABLET BY MOUTH EVERY DAY 2/10/23  Yes Dolly Foss APRN   gabapentin (NEURONTIN) 100 MG capsule TAKE 1 CAPSULE BY MOUTH EVERY 12 HOURS 4/17/23  Yes Dolly Foss APRN   hydroCHLOROthiazide (HYDRODIURIL) 12.5 MG tablet TAKE 1 TABLET BY MOUTH EVERY DAY 3/15/23  Yes Dolly Foss APRN   HYDROcodone-acetaminophen (NORCO) 7.5-325 MG per tablet Take 1 tablet by mouth Every 6 (Six) Hours As Needed for Moderate Pain. Pls fill on 5/10 5/5/23  Yes Dolly Foss APRN   insulin aspart (NovoLOG FlexPen) 100 UNIT/ML solution pen-injector sc pen INJECT 30 UNITS UNDER SKIN AS DIRECTED 3 TIMES A DAY WITH MEALS 3/9/23  Yes Elvis Gamboa APRN   Insulin Glargine (BASAGLAR KWIKPEN) 100 UNIT/ML injection pen Inject 40 units into the appropriate area every morning and 35 units into the appropriate area every night 10/24/22  Yes Elvis Gamboa APRN   isosorbide mononitrate (IMDUR) 30 MG 24 hr tablet TAKE 1 TABLET BY MOUTH EVERY DAY 4/11/23  Yes Dolly Foss APRN   levothyroxine (Synthroid) 50 MCG tablet Take 1 tablet by mouth Daily. 4/26/23 4/25/24 Yes Elvis Gamboa APRN   meclizine (ANTIVERT) 25 MG tablet Take 1 tablet by mouth 3 (Three) Times a Day As Needed for dizziness. 12/14/17  Yes Evon Hernandez APRN   methocarbamol (ROBAXIN) 500 MG tablet TAKE 1 TABLET BY MOUTH 2 TIMES A DAY AS NEEDED FOR MUSCLE SPASMS. 6/7/22  Yes Kimberly Ferguson APRN   metoclopramide (REGLAN) 10 MG tablet TAKE 1 TABLET BY MOUTH 4 (FOUR) TIMES A DAY AS NEEDED (NAUSEA). 11/29/22  Yes Dolly Foss APRN   metoprolol succinate XL (TOPROL-XL) 50 MG 24 hr tablet Take 1 tablet by mouth Daily. Dose increased 5/24/21 12/27/22  Yes Dolly Foss APRN   pantoprazole (PROTONIX) 20 MG EC tablet Take 2 tablets by mouth Daily. 2/20/23  Yes Dolly Foss APRN   ranolazine (RANEXA) 500 MG 12 hr tablet Take 1 tablet by mouth Every 12 (Twelve) Hours.  "6/29/22  Yes Bill Gibson MD   sucralfate (Carafate) 1 g tablet Take 1 tablet by mouth 4 (Four) Times a Day. 1/11/23  Yes Marleny Montoya PA-C   venlafaxine XR (EFFEXOR-XR) 75 MG 24 hr capsule Take 1 capsule by mouth Daily With Breakfast. 3/10/23  Yes Dolly Foss APRN   vitamin D (ERGOCALCIFEROL) 1.25 MG (41102 UT) capsule capsule TAKE 1 CAPSULE BY MOUTH EVERY 7 DAYS. 12/29/22  Yes Dolly Foss APRN   amLODIPine (NORVASC) 5 MG tablet TAKE 1 TABLET BY MOUTH EVERY DAY 5/26/23   Pranav Sutton MD B-D ULTRAFINE III SHORT PEN 31G X 8 MM misc USE TO INJECT 5 TIMES A DAY 11/11/22   Elvis Gamboa APRN B-D ULTRAFINE III SHORT PEN 31G X 8 MM misc USE UP TO 4 (FOUR) TIMES DAILY WITH INSULIN AS DIRECTED. 12/27/22   Dolly Foss APRN   BD SHARPS CONTAINER HOME misc 1 each Take As Directed. 9/7/18   Francisca Fong MD   Blood Glucose Monitoring Suppl (ONE TOUCH ULTRA MINI) w/Device kit Patient will need the Accu-Check Avitio meter 10/18/21   Marty, BEBE Luu   dicyclomine (BENTYL) 20 MG tablet Take 1 tablet by mouth Every 6 (Six) Hours. 5/5/23   Provider, MD Esther   glucose blood (Accu-Chek Guide) test strip 1 each by Other route 4 (Four) Times a Day. To test blood sugar 4X daily. For  Dx E11.65 2/28/23   Marty, BEBE Luu   glucose monitor monitoring kit 1 each Daily. accucheck eve meter, E11.9 6/11/20   Elvis Gamboa APRN   Lancets (accu-chek soft touch) lancets As directed 10/18/21   Marty, BEBE Luu   meloxicam (MOBIC) 15 MG tablet TAKE 1 TABLET BY MOUTH EVERY DAY WITH FOOD 12/7/22   Rohan Lazo MD   naloxone (NARCAN) 0.4 MG/ML injection Infuse 0.5 mL into a venous catheter Every 5 (Five) Minutes As Needed for Opioid Reversal. 3/13/21   Nick Faye MD   Needle, Disp, (Easy Touch FlipLock Needles) 25G X 1-1/2\" misc 1 each Every 28 (Twenty-Eight) Days. For administering B12 cyanocobalomin. Dx vitamin B12 deficiency. " "5/24/22   Kimberly Ferguson APRN   Needle, Disp, (Hypodermic Needle) 20G X 1\" misc 1 each Every 28 (Twenty-Eight) Days. For drawing up B12 for injection monthly, change back to smaller gauge needle prior to injection. Dx Vitamin B12  Deficiency. 5/24/22   Kimberly Ferguson APRN   nitroglycerin (NITROSTAT) 0.4 MG SL tablet Place 1 tablet under the tongue Every 5 (Five) Minutes As Needed for Chest Pain. Take no more than 3 doses in 15 minutes. 4/26/22   Kimberly Ferguson APRN   ondansetron (ZOFRAN) 8 MG tablet Take 1 tablet by mouth Every 8 (Eight) Hours As Needed for Nausea or Vomiting. 3/27/23   Dolly Foss APRN   ondansetron ODT (ZOFRAN-ODT) 4 MG disintegrating tablet Place 1 tablet on the tongue 4 (Four) Times a Day As Needed for Nausea or Vomiting. 2/27/23   Dolly Foss APRN   Syringe 20G X 1-1/2\" 3 ML misc 1 each Every 28 (Twenty-Eight) Days. For injection cyanocobalomin, Dx vit B12 deficiency. 5/24/22   Kimberly Ferguson APRN       amLODIPine, 5 mg, Oral, Daily  ARIPiprazole, 5 mg, Oral, Daily  aspirin EC, 325 mg, Oral, Daily  atorvastatin, 80 mg, Oral, Daily  Bag Balm, 1 application, Topical, BID  clopidogrel, 75 mg, Oral, Daily  cyanocobalamin, 1,000 mcg, Intramuscular, Q28 Days  folic acid, 1,000 mcg, Oral, Daily  furosemide, 40 mg, Oral, Daily  gabapentin, 200 mg, Oral, Q12H  heparin (porcine), 5,000 Units, Subcutaneous, Q8H  hydroCHLOROthiazide, 12.5 mg, Oral, Daily  Insulin Aspart, 0-14 Units, Subcutaneous, TID AC  Insulin Aspart, 12 Units, Subcutaneous, TID With Meals  insulin detemir, 30 Units, Subcutaneous, Daily  insulin detemir, 45 Units, Subcutaneous, Nightly  isosorbide mononitrate, 30 mg, Oral, Daily  levothyroxine, 50 mcg, Oral, QAM  pantoprazole, 40 mg, Oral, Daily  ranolazine, 500 mg, Oral, Q12H  senna-docusate sodium, 2 tablet, Oral, BID  sodium chloride, 10 mL, Intravenous, Q12H  sodium chloride, 10 mL, Intravenous, Q12H  sucralfate, 1 g, Oral, 4x Daily  tamsulosin, 0.4 mg, " Oral, Daily  venlafaxine XR, 75 mg, Oral, Daily With Breakfast           Objective    Temp:  [95.7 °F (35.4 °C)-96.8 °F (36 °C)] 96.8 °F (36 °C)  Heart Rate:  [75-82] 82  Resp:  [20] 20  BP: (103-124)/(52-56) 116/54    Physical Exam  Constitutional:       Appearance: She is obese.   HENT:      Nose: Nose normal.   Eyes:      Extraocular Movements: Extraocular movements intact.   Cardiovascular:      Rate and Rhythm: Regular rhythm.      Heart sounds: Normal heart sounds.   Pulmonary:      Breath sounds: Normal breath sounds.   Abdominal:      General: Bowel sounds are normal.      Palpations: Abdomen is soft.   Musculoskeletal:         General: Normal range of motion.      Cervical back: Normal range of motion and neck supple.      Comments: Left BKA, old   Skin:     General: Skin is warm.      Comments: Right distal leg with some scaling skin, likely due to her resolving cellulitis   Neurological:      General: No focal deficit present.      Mental Status: She is alert. Mental status is at baseline.   Psychiatric:         Behavior: Behavior normal.       Results Review:  I have reviewed the labs, radiology results, and diagnostic studies.    Laboratory Data:         Invalid input(s): PATRICK CARTAGENA  Estimated Creatinine Clearance: 86.3 mL/min (by C-G formula based on SCr of 0.92 mg/dL).          Results from last 7 days   Lab Units 06/06/23  0536 06/05/23  0532 06/04/23  0540 06/03/23  0623 06/02/23  0533   WBC 10*3/mm3 7.20 12.17* 10.39 9.13 9.62   HEMOGLOBIN g/dL 12.0 11.7* 11.8* 11.8* 11.8*   HEMATOCRIT % 35.6 34.7 36.1 36.8 36.7   PLATELETS 10*3/mm3 418 396 431 423 422             Culture Data:   No results found for: BLOODCX  No results found for: URINECX  No results found for: RESPCX  No results found for: WOUNDCX  No results found for: STOOLCX  No components found for: BODYFLD    Radiology Data:   Imaging Results (Last 24 Hours)       ** No results found for the last 24 hours. **            I have reviewed  the patient's current medications.     Assessment/Plan   Right lower leg cellulitis     With US venous doppler negative for DVT     Was on vanco and zosyn; resolved     Sepsis  (POA)     With fever, leukocytosis and left shifting      Resolved     Physical deconditioning     PT and OT on the case     CM working on placement to ARU but she was denied; she would like to try SNFs and if that is not an option, she is willing to go back home with PT and OT    Urinary retention     During her hospitalization: resolved       Chronic medical problems     Essential hypertension     CAD     Type II DM, uncontrolled with A1C of 9.8     Bipolar disorder     Hyperlipidemia     Left BKA; uses prosthesis         Medical Decision Making  Number and Complexity of problems: one major complex  Differential Diagnosis: pneumonia    Conditions and Status:        Condition is improving.     MDM Data  External documents reviewed: previous medical records  My EKG interpretation: n/a  My plain film interpretation: no acute event     Discussed with: patient     Treatment Plan  See above    Care Planning  Shared decision making: her  Nikia  Code status and discussions: full code    Disposition  Social Determinants of Health that impact treatment or disposition: none  I expect the patient to be discharged to  rehab unit in 1-2 days or when accepted     I confirmed that the patient's Advance Care Plan is present, code status is documented, or surrogate decision maker is listed in the patient's medical record.     Nader Higgins MD

## 2023-06-06 NOTE — PLAN OF CARE
Goal Outcome Evaluation: Pt tolerating care without any difficulty. Pt voices no concerns or complaints.

## 2023-06-06 NOTE — PLAN OF CARE
Goal Outcome Evaluation:  Plan of Care Reviewed With: patient     Problem: Adult Inpatient Plan of Care  Goal: Plan of Care Review  Outcome: Ongoing, Progressing  Flowsheets (Taken 6/6/2023 1314)  Progress: improving  Plan of Care Reviewed With: patient  Outcome Evaluation: pt responded well to PT with increased gait to 24 ft x2 CGA with RW. pt requires min A for bed mobility with HOB flat and no bed rails. pt participated in LE and core strengthening. no new goals met at this time. pt would continue to benefit from PT services.

## 2023-06-06 NOTE — PLAN OF CARE
Goal Outcome Evaluation:  Plan of Care Reviewed With: patient           Outcome Evaluation: OT tx complete, patient alert x4, agreeable for tx. Supine to sit with modified independence. LE dressing with CGA, donning/doffing sock, prosthetic liner, and prosthetic. Sit to stand, toilet t/f (commode over toilet), and functional mobility with CGA and RW. Toileting with CGA. Patient able to perform functional mobility from bathroom to recliner with RW, and prosthetic. 3 sets x 20 reps of BUE exercises 3 lbs dowl. 2 static reps of  standing and weight shifitng performed, Patient tolerated 20 seconds prior to feeling discomfort on L resdiual limb. Prosthetic left on to improve patient tolerance to prosthetic. Patient tolerated session well. All needs in reach. Cont OT POC.

## 2023-06-07 ENCOUNTER — APPOINTMENT (OUTPATIENT)
Dept: ULTRASOUND IMAGING | Facility: HOSPITAL | Age: 63
DRG: 872 | End: 2023-06-07
Payer: COMMERCIAL

## 2023-06-07 LAB
BASOPHILS # BLD AUTO: 0.04 10*3/MM3 (ref 0–0.2)
BASOPHILS NFR BLD AUTO: 0.5 % (ref 0–1.5)
DEPRECATED RDW RBC AUTO: 43.1 FL (ref 37–54)
EOSINOPHIL # BLD AUTO: 0.29 10*3/MM3 (ref 0–0.4)
EOSINOPHIL NFR BLD AUTO: 3.5 % (ref 0.3–6.2)
ERYTHROCYTE [DISTWIDTH] IN BLOOD BY AUTOMATED COUNT: 13.8 % (ref 12.3–15.4)
GLUCOSE BLDC GLUCOMTR-MCNC: 182 MG/DL (ref 70–130)
GLUCOSE BLDC GLUCOMTR-MCNC: 322 MG/DL (ref 70–130)
GLUCOSE BLDC GLUCOMTR-MCNC: 326 MG/DL (ref 70–130)
HCT VFR BLD AUTO: 35.5 % (ref 34–46.6)
HGB BLD-MCNC: 12 G/DL (ref 12–15.9)
HOLD SPECIMEN: NORMAL
IMM GRANULOCYTES # BLD AUTO: 0.03 10*3/MM3 (ref 0–0.05)
IMM GRANULOCYTES NFR BLD AUTO: 0.4 % (ref 0–0.5)
LYMPHOCYTES # BLD AUTO: 1.77 10*3/MM3 (ref 0.7–3.1)
LYMPHOCYTES NFR BLD AUTO: 21.4 % (ref 19.6–45.3)
MCH RBC QN AUTO: 28.8 PG (ref 26.6–33)
MCHC RBC AUTO-ENTMCNC: 33.8 G/DL (ref 31.5–35.7)
MCV RBC AUTO: 85.3 FL (ref 79–97)
MONOCYTES # BLD AUTO: 0.68 10*3/MM3 (ref 0.1–0.9)
MONOCYTES NFR BLD AUTO: 8.2 % (ref 5–12)
NEUTROPHILS NFR BLD AUTO: 5.47 10*3/MM3 (ref 1.7–7)
NEUTROPHILS NFR BLD AUTO: 66 % (ref 42.7–76)
NRBC BLD AUTO-RTO: 0 /100 WBC (ref 0–0.2)
PLATELET # BLD AUTO: 415 10*3/MM3 (ref 140–450)
PMV BLD AUTO: 9.3 FL (ref 6–12)
RBC # BLD AUTO: 4.16 10*6/MM3 (ref 3.77–5.28)
SARS-COV-2 RNA RESP QL NAA+PROBE: NOT DETECTED
WBC NRBC COR # BLD: 8.28 10*3/MM3 (ref 3.4–10.8)

## 2023-06-07 PROCEDURE — 82948 REAGENT STRIP/BLOOD GLUCOSE: CPT

## 2023-06-07 PROCEDURE — 63710000001 INSULIN ASPART PER 5 UNITS: Performed by: INTERNAL MEDICINE

## 2023-06-07 PROCEDURE — 25010000002 HEPARIN (PORCINE) PER 1000 UNITS: Performed by: UROLOGY

## 2023-06-07 PROCEDURE — 97530 THERAPEUTIC ACTIVITIES: CPT

## 2023-06-07 PROCEDURE — 87635 SARS-COV-2 COVID-19 AMP PRB: CPT | Performed by: STUDENT IN AN ORGANIZED HEALTH CARE EDUCATION/TRAINING PROGRAM

## 2023-06-07 PROCEDURE — 63710000001 INSULIN DETEMIR PER 5 UNITS: Performed by: HOSPITALIST

## 2023-06-07 PROCEDURE — 63710000001 ONDANSETRON PER 8 MG: Performed by: UROLOGY

## 2023-06-07 PROCEDURE — 85025 COMPLETE CBC W/AUTO DIFF WBC: CPT | Performed by: HOSPITALIST

## 2023-06-07 PROCEDURE — 93971 EXTREMITY STUDY: CPT

## 2023-06-07 PROCEDURE — 97110 THERAPEUTIC EXERCISES: CPT

## 2023-06-07 PROCEDURE — 63710000001 INSULIN DETEMIR PER 5 UNITS: Performed by: UROLOGY

## 2023-06-07 PROCEDURE — 97116 GAIT TRAINING THERAPY: CPT

## 2023-06-07 PROCEDURE — 25010000002 ONDANSETRON PER 1 MG: Performed by: UROLOGY

## 2023-06-07 RX ORDER — HYDROCODONE BITARTRATE AND ACETAMINOPHEN 7.5; 325 MG/1; MG/1
1 TABLET ORAL EVERY 6 HOURS PRN
Status: DISCONTINUED | OUTPATIENT
Start: 2023-06-07 | End: 2023-06-08 | Stop reason: HOSPADM

## 2023-06-07 RX ADMIN — HEPARIN SODIUM 5000 UNITS: 5000 INJECTION INTRAVENOUS; SUBCUTANEOUS at 05:28

## 2023-06-07 RX ADMIN — RANOLAZINE 500 MG: 500 TABLET, FILM COATED, EXTENDED RELEASE ORAL at 09:01

## 2023-06-07 RX ADMIN — HEPARIN SODIUM 5000 UNITS: 5000 INJECTION INTRAVENOUS; SUBCUTANEOUS at 21:15

## 2023-06-07 RX ADMIN — ISOSORBIDE MONONITRATE 30 MG: 30 TABLET, EXTENDED RELEASE ORAL at 09:01

## 2023-06-07 RX ADMIN — INSULIN ASPART 12 UNITS: 100 INJECTION, SOLUTION INTRAVENOUS; SUBCUTANEOUS at 11:27

## 2023-06-07 RX ADMIN — Medication 10 ML: at 21:17

## 2023-06-07 RX ADMIN — TAMSULOSIN HYDROCHLORIDE 0.4 MG: 0.4 CAPSULE ORAL at 09:01

## 2023-06-07 RX ADMIN — LORAZEPAM 0.5 MG: 0.5 TABLET ORAL at 10:44

## 2023-06-07 RX ADMIN — SUCRALFATE 1 G: 1 TABLET ORAL at 17:54

## 2023-06-07 RX ADMIN — INSULIN ASPART 5 UNITS: 100 INJECTION, SOLUTION INTRAVENOUS; SUBCUTANEOUS at 11:27

## 2023-06-07 RX ADMIN — FUROSEMIDE 40 MG: 40 TABLET ORAL at 09:01

## 2023-06-07 RX ADMIN — Medication 10 ML: at 09:09

## 2023-06-07 RX ADMIN — Medication 1 APPLICATION: at 21:15

## 2023-06-07 RX ADMIN — INSULIN ASPART 12 UNITS: 100 INJECTION, SOLUTION INTRAVENOUS; SUBCUTANEOUS at 09:04

## 2023-06-07 RX ADMIN — PANTOPRAZOLE SODIUM 40 MG: 40 TABLET, DELAYED RELEASE ORAL at 09:00

## 2023-06-07 RX ADMIN — HYDROCODONE BITARTRATE AND ACETAMINOPHEN 1 TABLET: 7.5; 325 TABLET ORAL at 19:49

## 2023-06-07 RX ADMIN — ATORVASTATIN CALCIUM 80 MG: 40 TABLET, FILM COATED ORAL at 09:00

## 2023-06-07 RX ADMIN — HYDROCHLOROTHIAZIDE 12.5 MG: 12.5 TABLET ORAL at 09:01

## 2023-06-07 RX ADMIN — LEVOTHYROXINE SODIUM 50 MCG: 50 TABLET ORAL at 05:28

## 2023-06-07 RX ADMIN — FOLIC ACID 1000 MCG: 1 TABLET ORAL at 09:00

## 2023-06-07 RX ADMIN — Medication 1 APPLICATION: at 09:00

## 2023-06-07 RX ADMIN — CLOPIDOGREL BISULFATE 75 MG: 75 TABLET ORAL at 09:01

## 2023-06-07 RX ADMIN — ASPIRIN 325 MG: 325 TABLET, COATED ORAL at 09:01

## 2023-06-07 RX ADMIN — INSULIN ASPART 12 UNITS: 100 INJECTION, SOLUTION INTRAVENOUS; SUBCUTANEOUS at 17:53

## 2023-06-07 RX ADMIN — VENLAFAXINE HYDROCHLORIDE 75 MG: 75 CAPSULE, EXTENDED RELEASE ORAL at 09:00

## 2023-06-07 RX ADMIN — GABAPENTIN 200 MG: 100 CAPSULE ORAL at 09:00

## 2023-06-07 RX ADMIN — GABAPENTIN 200 MG: 100 CAPSULE ORAL at 21:15

## 2023-06-07 RX ADMIN — SUCRALFATE 1 G: 1 TABLET ORAL at 11:27

## 2023-06-07 RX ADMIN — SUCRALFATE 1 G: 1 TABLET ORAL at 21:15

## 2023-06-07 RX ADMIN — HEPARIN SODIUM 5000 UNITS: 5000 INJECTION INTRAVENOUS; SUBCUTANEOUS at 13:49

## 2023-06-07 RX ADMIN — HYDROCODONE BITARTRATE AND ACETAMINOPHEN 1 TABLET: 5; 325 TABLET ORAL at 05:28

## 2023-06-07 RX ADMIN — INSULIN ASPART 5 UNITS: 100 INJECTION, SOLUTION INTRAVENOUS; SUBCUTANEOUS at 09:04

## 2023-06-07 RX ADMIN — ONDANSETRON 4 MG: 2 INJECTION INTRAMUSCULAR; INTRAVENOUS at 21:18

## 2023-06-07 RX ADMIN — INSULIN DETEMIR 30 UNITS: 100 INJECTION, SOLUTION SUBCUTANEOUS at 09:06

## 2023-06-07 RX ADMIN — INSULIN ASPART 2 UNITS: 100 INJECTION, SOLUTION INTRAVENOUS; SUBCUTANEOUS at 17:52

## 2023-06-07 RX ADMIN — INSULIN DETEMIR 45 UNITS: 100 INJECTION, SOLUTION SUBCUTANEOUS at 21:16

## 2023-06-07 RX ADMIN — RANOLAZINE 500 MG: 500 TABLET, FILM COATED, EXTENDED RELEASE ORAL at 21:15

## 2023-06-07 RX ADMIN — AMLODIPINE BESYLATE 5 MG: 5 TABLET ORAL at 09:01

## 2023-06-07 RX ADMIN — HYDROCODONE BITARTRATE AND ACETAMINOPHEN 1 TABLET: 7.5; 325 TABLET ORAL at 13:49

## 2023-06-07 RX ADMIN — ONDANSETRON HYDROCHLORIDE 4 MG: 4 TABLET, FILM COATED ORAL at 05:28

## 2023-06-07 RX ADMIN — ARIPIPRAZOLE 5 MG: 5 TABLET ORAL at 09:06

## 2023-06-07 RX ADMIN — ONDANSETRON 4 MG: 2 INJECTION INTRAMUSCULAR; INTRAVENOUS at 12:46

## 2023-06-07 RX ADMIN — Medication 10 ML: at 21:16

## 2023-06-07 RX ADMIN — SUCRALFATE 1 G: 1 TABLET ORAL at 09:00

## 2023-06-07 NOTE — THERAPY TREATMENT NOTE
Acute Care - Physical Therapy Treatment Note  [facility]     Patient Name: Ariana Martinez  : 1960  MRN: [id number]  Today's Date: 2023      Visit Dx:     ICD-10-CM ICD-9-CM   1. Sepsis without acute organ dysfunction, due to unspecified organism  A41.9 038.9     995.91   2. Cellulitis of right lower extremity  L03.115 682.6   3. Type 2 diabetes mellitus with hyperglycemia, unspecified whether long term insulin use  E11.65 250.00   4. Impaired mobility and activities of daily living  Z74.09 V49.89    Z78.9    5. Impaired physical mobility  Z74.09 781.99   6. Impaired mobility and ADLs  Z74.09 V49.89    Z78.9    7. Urinary retention  R33.9 788.20     Patient Active Problem List   Diagnosis    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, with long-term current use of insulin    Closed nondisplaced fracture of fifth left metatarsal bone    MAURICIO (generalized anxiety disorder)    Depression, major, recurrent, moderate (HCC)    GERD without esophagitis    Long term prescription opiate use    Mixed hyperlipidemia    Vitamin D deficiency    Seasonal allergic rhinitis    Restrictive lung disease secondary to obesity    Adult body mass index 37.0-37.9    Snoring    Class 3 severe obesity due to excess calories without serious comorbidity with body mass index (BMI) of 40.0 to 44.9 in adult (HCC)    (HFpEF) heart failure with preserved ejection fraction    Type 2 diabetes mellitus with hyperglycemia, with long-term current use of insulin (HCC)    Cyanocobalamin deficiency    Coronary artery disease of native artery of native heart with stable angina pectoris    Hypertension    Meniere's disease    Gastroparesis    Pulmonary hypertension (HCC)    Pes cavus    Primary osteoarthritis involving multiple joints    Generalized anxiety disorder    Chronic right-sided low back pain with right-sided sciatica    Chest pain    Adverse effect of iron    Chest pain due to myocardial ischemia     Nonrheumatic tricuspid valve regurgitation    Iron deficiency anemia due to chronic blood loss    Malaise and fatigue    Ankle arthritis    Urinary retention    Endocarditis    Essential hypertension    Occlusion and stenosis of bilateral carotid arteries    S/P BKA (below knee amputation) unilateral, left (HCC)    Phantom pain after amputation of lower extremity    S/P CABG (coronary artery bypass graft)    Severe malnutrition    TIA (transient ischemic attack)    Coronary artery abnormality    Elevated d-dimer    Neuropathy    Chest pain, unspecified type    Acute pain of right shoulder    Rotator cuff syndrome, right    Acquired hypothyroidism    Bilateral leg edema    Left elbow pain    Gastritis    Olecranon bursitis, left elbow    Sepsis without acute organ dysfunction, due to unspecified organism     Past Medical History:   Diagnosis Date    Acute bacterial endocarditis 3/13/2021    Angina, class IV     Anxiety     Anxiety and depression     Arthritis     Benign paroxysmal positional vertigo     Bladder disorder     has bladder stimulator    Carpal tunnel syndrome     CHF (congestive heart failure)     Chronic pain     Coronary atherosclerosis     hx CABG 2005.  is followed by Dr Cher Amaya     Diabetes mellitus     Type 2, controlled    Diabetic polyneuropathy     Disease of thyroid gland     Elevated cholesterol     Female stress incontinence     Foot pain, left     Full dentures     Gastroparesis     GERD (gastroesophageal reflux disease)     Hyperlipidemia     Hypertension     Low back pain     Malaise and fatigue     Multiple joint pain     Obesity     Refuses to be weighed    Occlusion and stenosis of bilateral carotid arteries 6/18/2021    Otalgia     Both    Perforation of tympanic membrane     Left    Postoperative wound infection     Vitamin D deficiency     Wears glasses     reading     Past Surgical History:   Procedure Laterality Date    ABDOMINAL SURGERY      AMPUTATION FOOT       ANGIOPLASTY      coronary    ANKLE ARTHROSCOPY Left 02/26/2021    Procedure: Left foot hardware removal, ankle arthroscopy, bone grafting , left foot exostectomy;  Surgeon: Ignacio Lord DPM;  Location: [facility] OR;  Service: Podiatry;  Laterality: Left;    BREAST BIOPSY Right     CARDIAC CATHETERIZATION      CARDIAC CATHETERIZATION N/A 06/20/2017    Procedure: Right Heart Cath;  Surgeon: Can Kwon MD PhD;  Location: [facility] CATH INVASIVE LOCATION;  Service:     CARDIAC CATHETERIZATION N/A 02/18/2020    Procedure: Left Heart Cath;  Surgeon: Catalina Cooper MD;  Location: [facility] CATH INVASIVE LOCATION;  Service: Cardiology;  Laterality: N/A;    CARDIAC CATHETERIZATION N/A 04/06/2022    Procedure: Left Heart Cath;  Surgeon: Sheryl Navas MD;  Location: [facility] CATH INVASIVE LOCATION;  Service: Cardiology;  Laterality: N/A;    CARPAL TUNNEL RELEASE      CHOLECYSTECTOMY      COLONOSCOPY N/A 06/24/2020    Procedure: COLONOSCOPY;  Surgeon: Julián Maldonado MD;  Location: [facility] ENDOSCOPY;  Service: Gastroenterology;  Laterality: N/A;    CORONARY ARTERY BYPASS GRAFT  2005    CYSTOSCOPY N/A 5/27/2023    Procedure: FLEXIBLE CYSTOSCOPY;  Surgeon: Rohan Doe MD;  Location: [facility] OR;  Service: Urology;  Laterality: N/A;    ENDOSCOPY N/A 10/19/2018    Procedure: ESOPHAGOGASTRODUODENOSCOPY possible dilation;  Surgeon: Julián Maldonado MD;  Location: [facility] ENDOSCOPY;  Service: Gastroenterology    ENDOSCOPY N/A 06/24/2020    Procedure: ESOPHAGOGASTRODUODENOSCOPY WED appt please;  Surgeon: Julián Maldonado MD;  Location: [facility] ENDOSCOPY;  Service: Gastroenterology;  Laterality: N/A;    ENDOSCOPY N/A 06/10/2022    Procedure: ESOPHAGOGASTRODUODENOSCOPY   room 380;  Surgeon: Jeremiah Wilkins MD;  Location: [facility] ENDOSCOPY;  Service: Gastroenterology;  Laterality: N/A;    ENDOSCOPY N/A 1/10/2023    Procedure: ESOPHAGOGASTRODUODENOSCOPY;  Surgeon: Jeremiah Wilkins MD;  Location: [facility] ENDOSCOPY;   Service: Gastroenterology;  Laterality: N/A;    ENDOSCOPY AND COLONOSCOPY      FOOT SURGERY      Toes    FOOT SURGERY      GASTRIC BANDING      Revision, laparoscopic    HYSTERECTOMY      INCISION AND DRAINAGE LEG Left 03/12/2021    Procedure: Left ankle arthroscopic irrigation and debridement, screw removal;  Surgeon: Ignacio Lord DPM;  Location: [facility];  Service: Podiatry;  Laterality: Left;    MOUTH SURGERY      SALPINGO OOPHORECTOMY      SHOULDER SURGERY      SUBTALAR ARTHRODESIS Left 01/16/2019    Procedure: LEFT FOOT HARDWARE REMOVAL, FIFTH METATARSAL , OPEN REDUCTION INTERNAL FIXATION, CALCANEAL OSTEOTOMY;  Surgeon: Ignacio Lord DPM;  Location: [facility];  Service: Podiatry    SUBTALAR ARTHRODESIS Left 10/16/2019    Procedure: foot hardware removal, subtalar joint fusion  possible de/reattachment of achilles tendon        (c-arm);  Surgeon: Ignacio Lord DPM;  Location: [facility];  Service: Podiatry    SUBTALAR ARTHRODESIS Left 09/30/2020    Procedure: subtalar, talonavicular joint arthrodesis.  Removal hardware.          (c-arm);  Surgeon: Igncaio Lord DPM;  Location: [facility];  Service: Podiatry;  Laterality: Left;    TRANSESOPHAGEAL ECHOCARDIOGRAM (LAMONTE)      With color flow     PT Assessment (last 12 hours)       PT Evaluation and Treatment       Row Name 06/07/23 1256          Physical Therapy Time and Intention    Document Type therapy note (daily note)  -     Mode of Treatment physical therapy;individual therapy  -AME     Patient Effort good  -     Comment 34  -       Row Name 06/07/23 1256          General Information    Patient Profile Reviewed yes  -     Existing Precautions/Restrictions fall  -AME       Row Name 06/07/23 1256          Pain    Pretreatment Pain Rating 6/10  -     Pain Location - Side/Orientation Right  -     Pain Location - foot  and L arm.  -       Row Name 06/07/23 1256          Cognition    Affect/Mental Status (Cognition) WFL  -      Orientation Status (Cognition) oriented x 4  -     Follows Commands (Cognition) WFL  -     Personal Safety Interventions fall prevention program maintained;gait belt;muscle strengthening facilitated;nonskid shoes/slippers when out of bed;supervised activity  -       Row Name 06/07/23 1256          Bed Mobility    Bed Mobility rolling left;rolling right  -     Rolling Left [place] (Bed Mobility) standby assist  -     Rolling Right [place] (Bed Mobility) standby assist  -     Supine-Sit [place] (Bed Mobility) --  -     Sit-Supine [place] (Bed Mobility) --  -     Assistive Device (Bed Mobility) bed rails  -       Row Name 06/07/23 1256          Transfers    Transfers --  -       Row Name 06/07/23 1256          Bed-Chair Transfer    Bed-Chair [place] (Transfers) --  -     Assistive Device (Bed-Chair Transfers) --  -       Row Name 06/07/23 1256          Sit-Stand Transfer    Sit-Stand [place] (Transfers) --  -AME     Assistive Device (Sit-Stand Transfers) --  -       Row Name 06/07/23 1256          Stand-Sit Transfer    Stand-Sit [place] (Transfers) --  -AME     Assistive Device (Stand-Sit Transfers) --  -       Row Name 06/07/23 1256          Gait/Stairs (Locomotion)    Gait/Stairs Locomotion --  -     [place] Level (Gait) --  -     Assistive Device (Gait) --  -     Deviations/Abnormal Patterns (Gait) --  -       Row Name 06/07/23 1256          Motor Skills    Therapeutic Exercise --  ankle DF/PF, QS, GS, hip Ab/Ad, HS, SAQ, SLR, hip ext in SL, single-leg bridging, crunches- 30x1 arabella AROM.  -       Row Name             Wound 05/17/23 1828 Right anterior fifth toe    Wound - Properties Group Placement Date: 05/17/23  - Placement Time: 1828 - Side: Right  - Orientation: anterior  - Location: fifth toe  -JH    Retired Wound - Properties Group Placement Date: 05/17/23  - Placement Time: 1828 - Side: Right  - Orientation: anterior   -JH Location: fifth toe  -JH    Retired Wound - Properties Group Date first assessed: 05/17/23  -JH Time first assessed: 1828  -JH Side: Right  -JH Location: fifth toe  -JH      Row Name             Wound 05/23/23 1006 Right distal leg    Wound - Properties Group Placement Date: 05/23/23  -LH Placement Time: 1006  -LH Present on Hospital Admission: N  -LH Side: Right  -LH Orientation: distal  -LH Location: leg  -LH    Retired Wound - Properties Group Placement Date: 05/23/23  -LH Placement Time: 1006  -LH Present on Hospital Admission: N  -LH Side: Right  -LH Orientation: distal  -LH Location: leg  -LH    Retired Wound - Properties Group Date first assessed: 05/23/23  -LH Time first assessed: 1006  -LH Present on Hospital Admission: N  -LH Side: Right  -LH Location: leg  -LH      Row Name 06/07/23 1256          Vital Signs    Pre Systolic BP Rehab 114  -AME     Pre Treatment Diastolic BP 55  -AME     Post Systolic BP Rehab 120  -AME     Post Treatment Diastolic BP 54  -AME     Pretreatment Heart Rate (beats/min) 76  -AME     Posttreatment Heart Rate (beats/min) 78  -AME     Pre SpO2 (%) 93  -AME     O2 Delivery Pre Treatment nasal cannula  -AME     Post SpO2 (%) 94  -AME     O2 Delivery Post Treatment nasal cannula  -AME     Pre Patient Position Supine  -     Post Patient Position Supine  -       Row Name 06/07/23 1256          Positioning and Restraints    Pre-Treatment Position in bed  -AME     Post Treatment Position bed  -     In Bed fowlers;call light within reach;encouraged to call for assist;exit alarm on  all needs met.  -       Row Name 06/07/23 1256          Therapy Assessment/Plan (PT)    Rehab Potential (PT) good, to achieve stated therapy goals  -       Row Name 06/07/23 1256          Bed Mobility Goal 1 (PT)    Activity/Assistive Device (Bed Mobility Goal 1, PT) bed mobility activities, all  -     [place] Level/Cues Needed (Bed Mobility Goal 1, PT) standby assist;independent  -     Time Frame  (Bed Mobility Goal 1, PT) by discharge  -AME     Progress/Outcomes (Bed Mobility Goal 1, PT) goal met   -AME       Row Name 06/07/23 1256          Transfer Goal 1 (PT)    Activity/Assistive Device (Transfer Goal 1, PT) sit-to-stand/stand-to-sit;bed-to-chair/chair-to-bed  -AME     [place] Level/Cues Needed (Transfer Goal 1, PT) contact guard required;standby assist;modified independence  -AME     Time Frame (Transfer Goal 1, PT) by discharge  -AME     Progress/Outcome (Transfer Goal 1, PT) goal not met  -AME       Row Name 06/07/23 1256          Gait Training Goal 1 (PT)    Activity/Assistive Device (Gait Training Goal 1, PT) gait (walking locomotion);decrease fall risk  -AME     [place] Level (Gait Training Goal 1, PT) contact guard required;standby assist;modified independence  -AME     Distance (Gait Training Goal 1, PT) 50ft or more each trip  -AME     Time Frame (Gait Training Goal 1, PT) 1 week  -AME     Progress/Outcome (Gait Training Goal 1, PT) goal not met  -AME       Row Name 06/07/23 1256          ROM Goal 1 (PT)    ROM Goal 1 (PT) Pt will tolerate LE exercises OOB in chair with VSS  -AME     Time Frame (ROM Goal 1, PT) by discharge  -AME     Progress/Outcome (ROM Goal 1, PT) goal met   -               User Key  (r) = Recorded By, (t) = Taken By, (c) = Cosigned By      Initials Name Provider Type    Ousmane Gomes, PTA Physical Therapist Assistant    Juany Sage, RN Registered Nurse    Janae Severino LPN Licensed Nurse                    Physical Therapy Education       Title: PT OT SLP Therapies (In Progress)       Topic: Physical Therapy (In Progress)       Point: Mobility training (In Progress)       Learning Progress Summary             Patient Acceptance, E, NR by AME at 6/7/2023 1322    Acceptance, E, NR by AME at 6/6/2023 1314    Acceptance, E, NR by AME at 6/5/2023 1036    Acceptance, E,TB, VU by KIMBERLY at 6/4/2023 1458    Acceptance, E, VU by AB at 6/1/2023 0535    Acceptance, E, VU by AB  at 5/31/2023 0519    Acceptance, E, NR by AME at 5/29/2023 1123    Acceptance, E, NR by AME at 5/26/2023 1356    Acceptance, E,TB, VU by LW at 5/25/2023 1459    Acceptance, E, NR by AME at 5/24/2023 1306    Acceptance, E,TB, VU by NB at 5/18/2023 2138    Acceptance, E, NR by KIANA at 5/18/2023 1407                         Point: Home exercise program (Done)       Learning Progress Summary             Patient Acceptance, E,TB, VU by LW at 6/4/2023 1458    Acceptance, E, VU by AB at 6/1/2023 0535    Acceptance, E, VU by AB at 5/31/2023 0519    Acceptance, E, VU by AB at 5/30/2023 0516    Acceptance, E,TB, VU by LW at 5/25/2023 1459                         Point: Body mechanics (Done)       Learning Progress Summary             Patient Acceptance, E,TB, VU by LW at 6/4/2023 1458    Acceptance, E, VU by AB at 6/1/2023 0535    Acceptance, E, VU by AB at 5/31/2023 0519    Acceptance, E, VU by AB at 5/30/2023 0516    Acceptance, E,TB, VU by LW at 5/25/2023 1459    Acceptance, E,TB, VU by JH at 5/25/2023 1451                         Point: Precautions (Done)       Learning Progress Summary             Patient Acceptance, E,TB, VU by LW at 6/4/2023 1458    Acceptance, E, VU by AB at 6/1/2023 0535    Acceptance, E, VU by AB at 5/31/2023 0519    Acceptance, E, VU by AB at 5/30/2023 0516    Acceptance, E,TB, VU by LW at 5/25/2023 1459    Acceptance, E, NR by JC1 at 5/18/2023 1407                                         User Key       Initials Effective Dates Name Provider Type Discipline    [facility] 06/16/21 -  Aure Villaseñor, PT Physical Therapist PT    AME 06/16/21 -  Ousmane Zhang PTA Physical Therapist Assistant PT    LW 06/16/21 -  Sarah Irby COTA Occupational Therapist Assistant OT    NB 06/16/21 -  Samantha Cuellar RN Registered Nurse Nurse     09/08/22 -  Janae Amaya LPN Licensed Nurse Nurse    AB 11/07/22 -  Joana Mc LPN Licensed Nurse Nurse                  PT Recommendation and Plan  Anticipated  Discharge Disposition (PT): inpatient rehabilitation facility  Therapy Frequency (PT): other (see comments) (5-7 days/wk)  Plan of Care Reviewed With: patient  Progress: improving  Outcome Evaluation: pt c/o fatigue and pain. pt requests no OOB/gait at this time. pt participated in LE and core strengthening with good effort. no new goals met at this time. pt would continue to benefit from PT services.   Outcome Measures       Row Name 06/07/23 1256 06/06/23 1249 06/05/23 0958       How much help from another person do you currently need...    Turning from your back to your side while in flat bed without using bedrails? 3  -AME 3  -AME 3  -AME    Moving from lying on back to sitting on the side of a flat bed without bedrails? 3  -AME 3  -AME 3  -AME    Moving to and from a bed to a chair (including a wheelchair)? 3  -AME 3  -AME 3  -AME    Standing up from a chair using your arms (e.g., wheelchair, bedside chair)? 3  -AME 3  -AME 3  -AME    Climbing 3-5 steps with a railing? 2  -AME 2  -AME 1  -AME    To walk in hospital room? 3  -AME 3  -AME 3  -AME    AM-PAC 6 Clicks Score (PT) 17  -AME 17  -AME 16  -AME       Functional Assessment    Outcome Measure Options AM-PAC 6 Clicks Basic Mobility (PT)  -AME AM-PAC 6 Clicks Basic Mobility (PT)  -AME AM-PAC 6 Clicks Basic Mobility (PT)  -AME              User Key  (r) = Recorded By, (t) = Taken By, (c) = Cosigned By      Initials Name Provider Type    Ousmane Gomes, PTA Physical Therapist Assistant                     Time Calculation:    PT Charges       Row Name 06/07/23 1337             Time Calculation    Start Time 1256  -AME      Stop Time 1337  -AME      Time Calculation (min) 41 min  -AME         Time Calculation- PT    Total Timed Code Minutes- PT 41 minute(s)  -AME         Timed Charges    80414 - PT Therapeutic Exercise Minutes 41  -AME         Total Minutes    Timed Charges Total Minutes 41  -AME       Total Minutes 41  -AME                User Key  (r) = Recorded By, (t) = Taken By, (c)  = Cosigned By      Initials Name Provider Type    AME Ousmane Zhang PTA Physical Therapist Assistant                  Therapy Charges for Today       Code Description Service Date Service Provider Modifiers Qty    54997321397 HC PT THER PROC EA 15 MIN 6/6/2023 Ousmane Zhang, MANJINDER GP 1    76864905533 HC GAIT TRAINING EA 15 MIN 6/6/2023 Ousmane Zhang, MANJINDER GP 1    48326260693 HC PT THERAPEUTIC ACT EA 15 MIN 6/6/2023 Ousmane Zhang, MANJINDER GP 1    96516286312 HC PT THER PROC EA 15 MIN 6/7/2023 Ousmane Zhang, MANJINDER GP 3            PT G-Codes  Outcome Measure Options: AM-PAC 6 Clicks Basic Mobility (PT)  AM-PAC 6 Clicks Score (PT): 17  AM-PAC 6 Clicks Score (OT): 21    Ousmane Zhang PTA  6/7/2023

## 2023-06-07 NOTE — PLAN OF CARE
Goal Outcome Evaluation: Pt tolerating care without any difficulty. Pain controlled with prn po pain medication.

## 2023-06-07 NOTE — PLAN OF CARE
Goal Outcome Evaluation:  Plan of Care Reviewed With: patient     Problem: Adult Inpatient Plan of Care  Goal: Plan of Care Review  Outcome: Ongoing, Progressing  Flowsheets (Taken 6/7/2023 1322)  Progress: improving  Plan of Care Reviewed With: patient  Outcome Evaluation: pt c/o fatigue and pain. pt requests no OOB/gait at this time. pt participated in LE and core strengthening with good effort. no new goals met at this time. pt would continue to benefit from PT services.

## 2023-06-07 NOTE — PROGRESS NOTES
Adult Nutrition  Assessment    Patient Name:  Ariana Martinez  YOB: 1960  MRN: [id number]  Admit Date:  5/17/2023    Assessment Date:  6/7/2023    Comments:  Pt continues treatment for RLE cellulitis. Pt is being seen by urology for urinary retention, s/p cysto 5/27. Pt notes to have wounds x2 on toes of right foot, partial amputation of left foot. ADA diet, intakes adequate for LOS. Epic notes wts in Mar/April 2023- 255# and #. Only edema noted is LLE 1+ to foot/ankle. Last BM noted 6/4 flowsheet. Pt states meals are great, no concerns. RD continues w/ has no nutritional concerns at this time. D/c planning is in progress. RD to follow hospital course.         Electronically signed by:  Juany Rai RD  06/07/23 12:38 CDT

## 2023-06-07 NOTE — THERAPY TREATMENT NOTE
Acute Care - Physical Therapy Treatment Note  [facility]     Patient Name: Ariana Martinez  : 1960  MRN: [id number]  Today's Date: 2023      Visit Dx:     ICD-10-CM ICD-9-CM   1. Sepsis without acute organ dysfunction, due to unspecified organism  A41.9 038.9     995.91   2. Cellulitis of right lower extremity  L03.115 682.6   3. Type 2 diabetes mellitus with hyperglycemia, unspecified whether long term insulin use  E11.65 250.00   4. Impaired mobility and activities of daily living  Z74.09 V49.89    Z78.9    5. Impaired physical mobility  Z74.09 781.99   6. Impaired mobility and ADLs  Z74.09 V49.89    Z78.9    7. Urinary retention  R33.9 788.20     Patient Active Problem List   Diagnosis    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, with long-term current use of insulin    Closed nondisplaced fracture of fifth left metatarsal bone    MAURICIO (generalized anxiety disorder)    Depression, major, recurrent, moderate (HCC)    GERD without esophagitis    Long term prescription opiate use    Mixed hyperlipidemia    Vitamin D deficiency    Seasonal allergic rhinitis    Restrictive lung disease secondary to obesity    Adult body mass index 37.0-37.9    Snoring    Class 3 severe obesity due to excess calories without serious comorbidity with body mass index (BMI) of 40.0 to 44.9 in adult (HCC)    (HFpEF) heart failure with preserved ejection fraction    Type 2 diabetes mellitus with hyperglycemia, with long-term current use of insulin (HCC)    Cyanocobalamin deficiency    Coronary artery disease of native artery of native heart with stable angina pectoris    Hypertension    Meniere's disease    Gastroparesis    Pulmonary hypertension (HCC)    Pes cavus    Primary osteoarthritis involving multiple joints    Generalized anxiety disorder    Chronic right-sided low back pain with right-sided sciatica    Chest pain    Adverse effect of iron    Chest pain due to myocardial ischemia     Nonrheumatic tricuspid valve regurgitation    Iron deficiency anemia due to chronic blood loss    Malaise and fatigue    Ankle arthritis    Urinary retention    Endocarditis    Essential hypertension    Occlusion and stenosis of bilateral carotid arteries    S/P BKA (below knee amputation) unilateral, left (HCC)    Phantom pain after amputation of lower extremity    S/P CABG (coronary artery bypass graft)    Severe malnutrition    TIA (transient ischemic attack)    Coronary artery abnormality    Elevated d-dimer    Neuropathy    Chest pain, unspecified type    Acute pain of right shoulder    Rotator cuff syndrome, right    Acquired hypothyroidism    Bilateral leg edema    Left elbow pain    Gastritis    Olecranon bursitis, left elbow    Sepsis without acute organ dysfunction, due to unspecified organism     Past Medical History:   Diagnosis Date    Acute bacterial endocarditis 3/13/2021    Angina, class IV     Anxiety     Anxiety and depression     Arthritis     Benign paroxysmal positional vertigo     Bladder disorder     has bladder stimulator    Carpal tunnel syndrome     CHF (congestive heart failure)     Chronic pain     Coronary atherosclerosis     hx CABG 2005.  is followed by Dr Cher Amaya     Diabetes mellitus     Type 2, controlled    Diabetic polyneuropathy     Disease of thyroid gland     Elevated cholesterol     Female stress incontinence     Foot pain, left     Full dentures     Gastroparesis     GERD (gastroesophageal reflux disease)     Hyperlipidemia     Hypertension     Low back pain     Malaise and fatigue     Multiple joint pain     Obesity     Refuses to be weighed    Occlusion and stenosis of bilateral carotid arteries 6/18/2021    Otalgia     Both    Perforation of tympanic membrane     Left    Postoperative wound infection     Vitamin D deficiency     Wears glasses     reading     Past Surgical History:   Procedure Laterality Date    ABDOMINAL SURGERY      AMPUTATION FOOT       ANGIOPLASTY      coronary    ANKLE ARTHROSCOPY Left 02/26/2021    Procedure: Left foot hardware removal, ankle arthroscopy, bone grafting , left foot exostectomy;  Surgeon: Ignacio Lord DPM;  Location: [facility] OR;  Service: Podiatry;  Laterality: Left;    BREAST BIOPSY Right     CARDIAC CATHETERIZATION      CARDIAC CATHETERIZATION N/A 06/20/2017    Procedure: Right Heart Cath;  Surgeon: Can Kwon MD PhD;  Location: [facility] CATH INVASIVE LOCATION;  Service:     CARDIAC CATHETERIZATION N/A 02/18/2020    Procedure: Left Heart Cath;  Surgeon: Catalina Cooper MD;  Location: [facility] CATH INVASIVE LOCATION;  Service: Cardiology;  Laterality: N/A;    CARDIAC CATHETERIZATION N/A 04/06/2022    Procedure: Left Heart Cath;  Surgeon: Sheryl Navas MD;  Location: [facility] CATH INVASIVE LOCATION;  Service: Cardiology;  Laterality: N/A;    CARPAL TUNNEL RELEASE      CHOLECYSTECTOMY      COLONOSCOPY N/A 06/24/2020    Procedure: COLONOSCOPY;  Surgeon: Julián Maldonado MD;  Location: [facility] ENDOSCOPY;  Service: Gastroenterology;  Laterality: N/A;    CORONARY ARTERY BYPASS GRAFT  2005    CYSTOSCOPY N/A 5/27/2023    Procedure: FLEXIBLE CYSTOSCOPY;  Surgeon: Rohan Doe MD;  Location: [facility] OR;  Service: Urology;  Laterality: N/A;    ENDOSCOPY N/A 10/19/2018    Procedure: ESOPHAGOGASTRODUODENOSCOPY possible dilation;  Surgeon: Julián Maldonado MD;  Location: [facility] ENDOSCOPY;  Service: Gastroenterology    ENDOSCOPY N/A 06/24/2020    Procedure: ESOPHAGOGASTRODUODENOSCOPY WED appt please;  Surgeon: Julián Maldonado MD;  Location: [facility] ENDOSCOPY;  Service: Gastroenterology;  Laterality: N/A;    ENDOSCOPY N/A 06/10/2022    Procedure: ESOPHAGOGASTRODUODENOSCOPY   room 380;  Surgeon: Jeremiah Wilkins MD;  Location: [facility] ENDOSCOPY;  Service: Gastroenterology;  Laterality: N/A;    ENDOSCOPY N/A 1/10/2023    Procedure: ESOPHAGOGASTRODUODENOSCOPY;  Surgeon: Jeremiah Wilkins MD;  Location: [facility] ENDOSCOPY;   Service: Gastroenterology;  Laterality: N/A;    ENDOSCOPY AND COLONOSCOPY      FOOT SURGERY      Toes    FOOT SURGERY      GASTRIC BANDING      Revision, laparoscopic    HYSTERECTOMY      INCISION AND DRAINAGE LEG Left 03/12/2021    Procedure: Left ankle arthroscopic irrigation and debridement, screw removal;  Surgeon: Ignacio Lord DPM;  Location: [facility];  Service: Podiatry;  Laterality: Left;    MOUTH SURGERY      SALPINGO OOPHORECTOMY      SHOULDER SURGERY      SUBTALAR ARTHRODESIS Left 01/16/2019    Procedure: LEFT FOOT HARDWARE REMOVAL, FIFTH METATARSAL , OPEN REDUCTION INTERNAL FIXATION, CALCANEAL OSTEOTOMY;  Surgeon: Ignacio Lord DPM;  Location: [facility];  Service: Podiatry    SUBTALAR ARTHRODESIS Left 10/16/2019    Procedure: foot hardware removal, subtalar joint fusion  possible de/reattachment of achilles tendon        (c-arm);  Surgeon: Ignacio Lord DPM;  Location: [facility];  Service: Podiatry    SUBTALAR ARTHRODESIS Left 09/30/2020    Procedure: subtalar, talonavicular joint arthrodesis.  Removal hardware.          (c-arm);  Surgeon: Ignacio Lord DPM;  Location: [facility];  Service: Podiatry;  Laterality: Left;    TRANSESOPHAGEAL ECHOCARDIOGRAM (LAMONTE)      With color flow     PT Assessment (last 12 hours)       PT Evaluation and Treatment       Row Name 06/07/23 1547 06/07/23 1256       Physical Therapy Time and Intention    Document Type therapy note (daily note)  -AME therapy note (daily note)  -AME    Mode of Treatment physical therapy;individual therapy  -AME physical therapy;individual therapy  -AME    Patient Effort good  -AME good  -AME    Comment 2nd tx performed per pt and spouses request so that pt could ambulate.  -AME 34  -AME      Row Name 06/07/23 1547 06/07/23 1256       General Information    Patient Profile Reviewed yes  -AME yes  -AME    Existing Precautions/Restrictions fall  -AME fall  -AME      Row Name 06/07/23 1547 06/07/23 1256       Pain    Pretreatment Pain  Rating 3/10  -AME 6/10  -    Posttreatment Pain Rating 3/10  -AME --    Pain Location - Side/Orientation Left  -AME Right  -    Pain Location upper  - --    Pain Location - extremity  - foot  and L arm.  -    Pain Intervention(s) Medication (See MAR)  - --      Row Name 06/07/23 1547 06/07/23 1256       Cognition    Affect/Mental Status (Cognition) WFL  - WFL  -    Orientation Status (Cognition) oriented x 4  -AME oriented x 4  -AME    Follows Commands (Cognition) WFL  - WFL  -    Personal Safety Interventions fall prevention program maintained;gait belt;muscle strengthening facilitated;nonskid shoes/slippers when out of bed;supervised activity  - fall prevention program maintained;gait belt;muscle strengthening facilitated;nonskid shoes/slippers when out of bed;supervised activity  -      Row Name 06/07/23 1547 06/07/23 1256       Bed Mobility    Bed Mobility supine-sit;sit-supine  - rolling left;rolling right  -    Rolling Left [place] (Bed Mobility) --  - standby assist  -    Rolling Right [place] (Bed Mobility) --  - standby assist  -    Supine-Sit [place] (Bed Mobility) contact guard  extended time required. relying heavily on bed rails.  - --  -    Sit-Supine [place] (Bed Mobility) contact guard  - --  -    Assistive Device (Bed Mobility) bed rails  - bed rails  -      Row Name 06/07/23 1547 06/07/23 1256       Transfers    Transfers sit-stand transfer;stand-sit transfer  - --  -    Comment, (Transfers) asssit required to don prosthess this tx.  - --      Row Name 06/07/23 1256          Bed-Chair Transfer    Bed-Chair [place] (Transfers) --  -     Assistive Device (Bed-Chair Transfers) --  -       Row Name 06/07/23 1547 06/07/23 1256       Sit-Stand Transfer    Sit-Stand [place] (Transfers) contact guard  - --  -    Assistive Device (Sit-Stand Transfers) walker, front-wheeled  - --  -      Row Name 06/07/23 1547 06/07/23  1256       Stand-Sit Transfer    Stand-Sit [place] (Transfers) contact guard  -AME --  -AME    Assistive Device (Stand-Sit Transfers) walker, front-wheeled  -AME --  -AME      Row Name 06/07/23 1547 06/07/23 1256       Gait/Stairs (Locomotion)    Gait/Stairs Locomotion gait/ambulation independence;gait/ambulation assistive device;distance ambulated;gait deviations;maintains weight-bearing status;gait pattern  -AME --  -AME    [place] Level (Gait) contact guard  -AME --  -AME    Assistive Device (Gait) walker, front-wheeled  -AME --  -AME    Distance in Feet (Gait) 24 ft x2  -AME --    Deviations/Abnormal Patterns (Gait) luis decreased;gait speed decreased;stride length decreased  -AME --  -AME      Row Name 06/07/23 1256          Motor Skills    Therapeutic Exercise --  ankle DF/PF, QS, GS, hip Ab/Ad, HS, SAQ, SLR, hip ext in SL, single-leg bridging, crunches- 30x1 arabella AROM.  -AME       Row Name             Wound 05/17/23 1828 Right anterior fifth toe    Wound - Properties Group Placement Date: 05/17/23  - Placement Time: 1828  -JH Side: Right  -JH Orientation: anterior  -JH Location: fifth toe  -JH    Retired Wound - Properties Group Placement Date: 05/17/23  - Placement Time: 1828  -JH Side: Right  -JH Orientation: anterior  -JH Location: fifth toe  -JH    Retired Wound - Properties Group Date first assessed: 05/17/23  - Time first assessed: 1828  -JH Side: Right  -JH Location: fifth toe  -JH      Row Name             Wound 05/23/23 1006 Right distal leg    Wound - Properties Group Placement Date: 05/23/23  - Placement Time: 1006  -LH Present on Hospital Admission: N  -LH Side: Right  -LH Orientation: distal  -LH Location: leg  -LH    Retired Wound - Properties Group Placement Date: 05/23/23  - Placement Time: 1006  -LH Present on Hospital Admission: N  -LH Side: Right  -LH Orientation: distal  -LH Location: leg  -LH    Retired Wound - Properties Group Date first assessed: 05/23/23  - Time first  assessed: 1006  - Present on Hospital Admission: N  -LH Side: Right  -LH Location: leg  -LH      Row Name 06/07/23 1547 06/07/23 1256       Vital Signs    Pre Systolic BP Rehab 105  -  -AME    Pre Treatment Diastolic BP 46  -AME 55  -AME    Post Systolic BP Rehab 133  -  -AME    Post Treatment Diastolic BP 63  -AME 54  -AME    Pretreatment Heart Rate (beats/min) 80  -AME 76  -AME    Posttreatment Heart Rate (beats/min) 94  -AME 78  -AME    Pre SpO2 (%) 97  -AME 93  -AME    O2 Delivery Pre Treatment nasal cannula  -AME nasal cannula  -AME    Post SpO2 (%) 99  -AME 94  -AME    O2 Delivery Post Treatment nasal cannula  -AME nasal cannula  -AME    Pre Patient Position Supine  -AME Supine  -AME    Post Patient Position Supine  -AME Supine  -AME      Row Name 06/07/23 1547 06/07/23 1256       Positioning and Restraints    Pre-Treatment Position in bed  -AME in bed  -AME    Post Treatment Position bed  -AME bed  -AME    In Bed fowlers;call light within reach;encouraged to call for assist;exit alarm on  all needs met  - fowlers;call light within reach;encouraged to call for assist;exit alarm on  all needs met.  -      Row Name 06/07/23 1547 06/07/23 1256       Therapy Assessment/Plan (PT)    Rehab Potential (PT) good, to achieve stated therapy goals  - good, to achieve stated therapy goals  -      Row Name 06/07/23 1547 06/07/23 1256       Bed Mobility Goal 1 (PT)    Activity/Assistive Device (Bed Mobility Goal 1, PT) bed mobility activities, all  -AME bed mobility activities, all  -AME    [place] Level/Cues Needed (Bed Mobility Goal 1, PT) standby assist;independent  -AME standby assist;independent  -AME    Time Frame (Bed Mobility Goal 1, PT) by discharge  -AME by discharge  -    Progress/Outcomes (Bed Mobility Goal 1, PT) goal met   - goal met   -      Row Name 06/07/23 1547 06/07/23 1256       Transfer Goal 1 (PT)    Activity/Assistive Device (Transfer Goal 1, PT) sit-to-stand/stand-to-sit;bed-to-chair/chair-to-bed   -AME sit-to-stand/stand-to-sit;bed-to-chair/chair-to-bed  -AME    [place] Level/Cues Needed (Transfer Goal 1, PT) contact guard required;standby assist;modified independence  -AME contact guard required;standby assist;modified independence  -AME    Time Frame (Transfer Goal 1, PT) by discharge  -AME by discharge  -AME    Progress/Outcome (Transfer Goal 1, PT) goal not met  -AME goal not met  -AME      Row Name 06/07/23 1547 06/07/23 1256       Gait Training Goal 1 (PT)    Activity/Assistive Device (Gait Training Goal 1, PT) gait (walking locomotion);decrease fall risk  -AME gait (walking locomotion);decrease fall risk  -AME    [place] Level (Gait Training Goal 1, PT) contact guard required;standby assist;modified independence  -AME contact guard required;standby assist;modified independence  -AME    Distance (Gait Training Goal 1, PT) 50ft or more each trip  -AME 50ft or more each trip  -AME    Time Frame (Gait Training Goal 1, PT) 1 week  -AME 1 week  -AME    Progress/Outcome (Gait Training Goal 1, PT) goal not met  -AME goal not met  -AME      Row Name 06/07/23 1547 06/07/23 1256       ROM Goal 1 (PT)    ROM Goal 1 (PT) Pt will tolerate LE exercises OOB in chair with VSS  -AME Pt will tolerate LE exercises OOB in chair with VSS  -AME    Time Frame (ROM Goal 1, PT) by discharge  -AME by discharge  -AME    Progress/Outcome (ROM Goal 1, PT) goal met   -AME goal met   -AME              User Key  (r) = Recorded By, (t) = Taken By, (c) = Cosigned By      Initials Name Provider Type    Ousmnae Gomes, PTA Physical Therapist Assistant    Juany Sage, RN Registered Nurse    Janae Severino LPN Licensed Nurse                    Physical Therapy Education       Title: PT OT SLP Therapies (In Progress)       Topic: Physical Therapy (In Progress)       Point: Mobility training (In Progress)       Learning Progress Summary             Patient Acceptance, E, NR by AME at 6/7/2023 1322    Acceptance, E, NR by AME at 6/6/2023 1314     Acceptance, E, NR by AME at 6/5/2023 1036    Acceptance, E,TB, VU by LW at 6/4/2023 1458    Acceptance, E, VU by AB at 6/1/2023 0535    Acceptance, E, VU by AB at 5/31/2023 0519    Acceptance, E, NR by AME at 5/29/2023 1123    Acceptance, E, NR by AME at 5/26/2023 1356    Acceptance, E,TB, VU by LW at 5/25/2023 1459    Acceptance, E, NR by AME at 5/24/2023 1306    Acceptance, E,TB, VU by NB at 5/18/2023 2138    Acceptance, E, NR by JC1 at 5/18/2023 1407                         Point: Home exercise program (Done)       Learning Progress Summary             Patient Acceptance, E,TB, VU by LW at 6/4/2023 1458    Acceptance, E, VU by AB at 6/1/2023 0535    Acceptance, E, VU by AB at 5/31/2023 0519    Acceptance, E, VU by AB at 5/30/2023 0516    Acceptance, E,TB, VU by LW at 5/25/2023 1459                         Point: Body mechanics (Done)       Learning Progress Summary             Patient Acceptance, E,TB, VU by LW at 6/4/2023 1458    Acceptance, E, VU by AB at 6/1/2023 0535    Acceptance, E, VU by AB at 5/31/2023 0519    Acceptance, E, VU by AB at 5/30/2023 0516    Acceptance, E,TB, VU by LW at 5/25/2023 1459    Acceptance, E,TB, VU by JH at 5/25/2023 1451                         Point: Precautions (Done)       Learning Progress Summary             Patient Acceptance, E,TB, VU by LW at 6/4/2023 1458    Acceptance, E, VU by AB at 6/1/2023 0535    Acceptance, E, VU by AB at 5/31/2023 0519    Acceptance, E, VU by AB at 5/30/2023 0516    Acceptance, E,TB, VU by LW at 5/25/2023 1459    Acceptance, E, NR by JC1 at 5/18/2023 1407                                         User Key       Initials Effective Dates Name Provider Type Discipline    1 06/16/21 -  Aure Villaseñor, PT Physical Therapist PT    AME 06/16/21 -  Ousmane Zhang PTA Physical Therapist Assistant PT    LW 06/16/21 -  Sarah Irby COTA Occupational Therapist Assistant OT    NB 06/16/21 -  Samantha Cuellar RN Registered Nurse Nurse     09/08/22 -   Janae Amaya LPN Licensed Nurse Nurse    AB 11/07/22 -  Joana Mc LPN Licensed Nurse Nurse                  PT Recommendation and Plan  Anticipated Discharge Disposition (PT): inpatient rehabilitation facility  Therapy Frequency (PT): other (see comments) (5-7 days/wk)  Plan of Care Reviewed With: patient  Progress: improving  Outcome Evaluation: 2nd tx performed per pt and family request becuase pt didn't feel she could ambulate during previous tx. CGA for bed mobility but pt relied heavily on the bed rail and required extended time/multiple attempts. gait- 24 ft x2 CGA with RW. pt doesnt feel safe with the walker yet. no new goals met at this time. pt would continue to benefit from PT services.   Outcome Measures       Row Name 06/07/23 1256 06/06/23 1249 06/05/23 0958       How much help from another person do you currently need...    Turning from your back to your side while in flat bed without using bedrails? 3  -AME 3  -AME 3  -AME    Moving from lying on back to sitting on the side of a flat bed without bedrails? 3  -AME 3  -AME 3  -AME    Moving to and from a bed to a chair (including a wheelchair)? 3  -AME 3  -AME 3  -AME    Standing up from a chair using your arms (e.g., wheelchair, bedside chair)? 3  -AME 3  -AME 3  -AME    Climbing 3-5 steps with a railing? 2  -AME 2  -AME 1  -AME    To walk in hospital room? 3  -AME 3  -AME 3  -AME    AM-PAC 6 Clicks Score (PT) 17  -AME 17  -AME 16  -AME       Functional Assessment    Outcome Measure Options AM-PAC 6 Clicks Basic Mobility (PT)  -AME AM-PAC 6 Clicks Basic Mobility (PT)  -AME AM-PAC 6 Clicks Basic Mobility (PT)  -AME              User Key  (r) = Recorded By, (t) = Taken By, (c) = Cosigned By      Initials Name Provider Type    Ousmane Gomes, PTA Physical Therapist Assistant                     Time Calculation:    PT Charges       Row Name 06/07/23 1624 06/07/23 1337          Time Calculation    Start Time 1547  -AME 1256  -AME     Stop Time 1613  -AME 1337  -AME      Time Calculation (min) 26 min  -AME 41 min  -        Time Calculation- PT    Total Timed Code Minutes- PT 26 minute(s)  -AME 41 minute(s)  -AME        Timed Charges    84369 - PT Therapeutic Exercise Minutes -- 41  -AME     37341 - Gait Training Minutes  18  -AME --     24435 - PT Therapeutic Activity Minutes 8  -AME --        Total Minutes    Timed Charges Total Minutes 26  -AME 41  -AME      Total Minutes 26  -AME 41  -AME               User Key  (r) = Recorded By, (t) = Taken By, (c) = Cosigned By      Initials Name Provider Type    AME Ousmane Zhang PTA Physical Therapist Assistant                  Therapy Charges for Today       Code Description Service Date Service Provider Modifiers Qty    04643966029 HC PT THER PROC EA 15 MIN 6/6/2023 Ousmane Zhang, PTA GP 1    61332844948 HC GAIT TRAINING EA 15 MIN 6/6/2023 Ousmane Zhang, PTA GP 1    88199063402 HC PT THERAPEUTIC ACT EA 15 MIN 6/6/2023 Ousmane Zhang, PTA GP 1    51351900831 HC PT THER PROC EA 15 MIN 6/7/2023 Ousmane Zhang, PTA GP 3    94828049746 HC GAIT TRAINING EA 15 MIN 6/7/2023 Ousmane Zhang, PTA GP 1    97718792780 HC PT THERAPEUTIC ACT EA 15 MIN 6/7/2023 Ousmane Zhang, PTA GP 1            PT G-Codes  Outcome Measure Options: AM-PAC 6 Clicks Daily Activity (OT)  AM-PAC 6 Clicks Score (PT): 17  AM-PAC 6 Clicks Score (OT): 21    Ousmane Zhang PTA  6/7/2023

## 2023-06-07 NOTE — PLAN OF CARE
Goal Outcome Evaluation:  Plan of Care Reviewed With: patient     Problem: Adult Inpatient Plan of Care  Goal: Plan of Care Review  6/7/2023 1622 by Ousmane Zhang PTA  Outcome: Ongoing, Progressing  Flowsheets (Taken 6/7/2023 1622)  Progress: improving  Plan of Care Reviewed With: patient  Outcome Evaluation: 2nd tx performed per pt and family request becuase pt didn't feel she could ambulate during previous tx. CGA for bed mobility but pt relied heavily on the bed rail and required extended time/multiple attempts. gait- 24 ft x2 CGA with RW. pt doesnt feel safe with the walker yet. no new goals met at this time. pt would continue to benefit from PT services.        \

## 2023-06-07 NOTE — PROGRESS NOTES
[facility] Medicine Services  INPATIENT PROGRESS NOTE    Length of Stay: 21  Date of Admission: 5/17/2023  Primary Care Physician: Dolly Foss APRN    Subjective   (S) Admitted for cough, fever, chills and diagnosed with Right lower extremity cellulitis      Complains of arm pain     Review of Systems   All other systems reviewed and are negative.   All pertinent negatives and positives are as above. All other systems have been reviewed and are negative unless otherwise stated.     Prior to Admission medications    Medication Sig Start Date End Date Taking? Authorizing Provider   ARIPiprazole (ABILIFY) 5 MG tablet Take 1 tablet by mouth Daily. 3/10/23  Yes Dolly Foss APRN   aspirin  MG tablet Take 1 tablet by mouth Daily. 2/8/22  Yes Mahin Esteves MD   atorvastatin (LIPITOR) 80 MG tablet Take 1 tablet by mouth Daily. 9/7/22  Yes Dolly Foss APRN   butalbital-acetaminophen-caffeine (FIORICET, ESGIC) -40 MG per tablet Take one to two tablets by mouth per headache episode as needed. 4/21/23  Yes Dolly Foss APRN   Calcium Citrate-Vitamin D 250-200 MG-UNIT tablet Take 2 tablets by mouth 2 (two) times a day.   Yes ProviderEsther MD   clopidogrel (PLAVIX) 75 MG tablet Take 1 tablet by mouth Daily. 3/28/23  Yes Dolly Foss APRN   cyanocobalamin 1000 MCG/ML injection INJECT 1 ML INTO THE APPROPRIATE MUSCLE AS DIRECTED BY PRESCRIBER EVERY 28 (TWENTY-EIGHT) DAYS. 4/7/23  Yes Pranav Sutton MD   famotidine (PEPCID) 40 MG tablet Take 1 tablet by mouth Daily. 4/14/23  Yes ProviderEsther MD   fluticasone (FLONASE) 50 MCG/ACT nasal spray INSTILL 2 SPRAYS IN EACH NOSTRIL AS DIRECTED BY PROVIDER DAILY  Patient taking differently: As Needed. 2/1/23  Yes Dolly Foss APRN   folic acid (FOLVITE) 1 MG tablet TAKE 1 TABLET BY MOUTH EVERY DAY 4/18/23  Yes Teressa Hammond APRN   furosemide (LASIX) 40 MG tablet TAKE 1 TABLET BY MOUTH EVERY  DAY 2/10/23  Yes Dolly Foss APRN   gabapentin (NEURONTIN) 100 MG capsule TAKE 1 CAPSULE BY MOUTH EVERY 12 HOURS 4/17/23  Yes Dolly Foss APRN   hydroCHLOROthiazide (HYDRODIURIL) 12.5 MG tablet TAKE 1 TABLET BY MOUTH EVERY DAY 3/15/23  Yes Dolly Foss APRN   HYDROcodone-acetaminophen (NORCO) 7.5-325 MG per tablet Take 1 tablet by mouth Every 6 (Six) Hours As Needed for Moderate Pain. Pls fill on 5/10 5/5/23  Yes Dolly Foss APRN   insulin aspart (NovoLOG FlexPen) 100 UNIT/ML solution pen-injector sc pen INJECT 30 UNITS UNDER SKIN AS DIRECTED 3 TIMES A DAY WITH MEALS 3/9/23  Yes Elvis Gamboa APRN   Insulin Glargine (BASAGLAR KWIKPEN) 100 UNIT/ML injection pen Inject 40 units into the appropriate area every morning and 35 units into the appropriate area every night 10/24/22  Yes Elvis Gamboa APRN   isosorbide mononitrate (IMDUR) 30 MG 24 hr tablet TAKE 1 TABLET BY MOUTH EVERY DAY 4/11/23  Yes Dolly Fsos APRN   levothyroxine (Synthroid) 50 MCG tablet Take 1 tablet by mouth Daily. 4/26/23 4/25/24 Yes Elvis Gamboa APRN   meclizine (ANTIVERT) 25 MG tablet Take 1 tablet by mouth 3 (Three) Times a Day As Needed for dizziness. 12/14/17  Yes Evon Hernandez APRN   methocarbamol (ROBAXIN) 500 MG tablet TAKE 1 TABLET BY MOUTH 2 TIMES A DAY AS NEEDED FOR MUSCLE SPASMS. 6/7/22  Yes Kimberly Ferguson APRN   metoclopramide (REGLAN) 10 MG tablet TAKE 1 TABLET BY MOUTH 4 (FOUR) TIMES A DAY AS NEEDED (NAUSEA). 11/29/22  Yes Dolly Foss APRN   metoprolol succinate XL (TOPROL-XL) 50 MG 24 hr tablet Take 1 tablet by mouth Daily. Dose increased 5/24/21 12/27/22  Yes Dolly Foss APRN   pantoprazole (PROTONIX) 20 MG EC tablet Take 2 tablets by mouth Daily. 2/20/23  Yes Dolly Foss APRN   ranolazine (RANEXA) 500 MG 12 hr tablet Take 1 tablet by mouth Every 12 (Twelve) Hours. 6/29/22  Yes Bill Gibson MD   sucralfate (Carafate) 1 g tablet Take 1  "tablet by mouth 4 (Four) Times a Day. 1/11/23  Yes Marleny Montoya PA-C   venlafaxine XR (EFFEXOR-XR) 75 MG 24 hr capsule Take 1 capsule by mouth Daily With Breakfast. 3/10/23  Yes Dolly Foss APRN   vitamin D (ERGOCALCIFEROL) 1.25 MG (95386 UT) capsule capsule TAKE 1 CAPSULE BY MOUTH EVERY 7 DAYS. 12/29/22  Yes Dolly Foss APRN   amLODIPine (NORVASC) 5 MG tablet TAKE 1 TABLET BY MOUTH EVERY DAY 5/26/23   Pranav Sutton MD B-D ULTRAFINE III SHORT PEN 31G X 8 MM misc USE TO INJECT 5 TIMES A DAY 11/11/22   Elvis Gamboa APRN B-D ULTRAFINE III SHORT PEN 31G X 8 MM misc USE UP TO 4 (FOUR) TIMES DAILY WITH INSULIN AS DIRECTED. 12/27/22   Dolly Foss APRN   BD SHARPS CONTAINER HOME misc 1 each Take As Directed. 9/7/18   Francisca Fong MD   Blood Glucose Monitoring Suppl (ONE TOUCH ULTRA MINI) w/Device kit Patient will need the Accu-Check Avitio meter 10/18/21   Ferguson, BEBE Luu   dicyclomine (BENTYL) 20 MG tablet Take 1 tablet by mouth Every 6 (Six) Hours. 5/5/23   Provider, MD Esther   glucose blood (Accu-Chek Guide) test strip 1 each by Other route 4 (Four) Times a Day. To test blood sugar 4X daily. For  Dx E11.65 2/28/23   Ferguson, BEBE Luu   glucose monitor monitoring kit 1 each Daily. accucheck eve meter, E11.9 6/11/20   Elvis Gamboa APRN   Lancets (accu-chek soft touch) lancets As directed 10/18/21   Marty, BEBE Luu   meloxicam (MOBIC) 15 MG tablet TAKE 1 TABLET BY MOUTH EVERY DAY WITH FOOD 12/7/22   Rohan Lazo MD   naloxone (NARCAN) 0.4 MG/ML injection Infuse 0.5 mL into a venous catheter Every 5 (Five) Minutes As Needed for Opioid Reversal. 3/13/21   Nick Faye MD   Needle, Disp, (Easy Touch FlipLock Needles) 25G X 1-1/2\" misc 1 each Every 28 (Twenty-Eight) Days. For administering B12 cyanocobalomin. Dx vitamin B12 deficiency. 5/24/22   Kimberly Ferguson APRN   Needle, Disp, (Hypodermic Needle) 20G X 1\" misc 1 " "each Every 28 (Twenty-Eight) Days. For drawing up B12 for injection monthly, change back to smaller gauge needle prior to injection. Dx Vitamin B12  Deficiency. 5/24/22   Kimberly Ferguson APRN   nitroglycerin (NITROSTAT) 0.4 MG SL tablet Place 1 tablet under the tongue Every 5 (Five) Minutes As Needed for Chest Pain. Take no more than 3 doses in 15 minutes. 4/26/22   Kimberly Ferguson APRN   ondansetron (ZOFRAN) 8 MG tablet Take 1 tablet by mouth Every 8 (Eight) Hours As Needed for Nausea or Vomiting. 3/27/23   Dolly Foss APRN   ondansetron ODT (ZOFRAN-ODT) 4 MG disintegrating tablet Place 1 tablet on the tongue 4 (Four) Times a Day As Needed for Nausea or Vomiting. 2/27/23   Dolly Foss APRN   Syringe 20G X 1-1/2\" 3 ML misc 1 each Every 28 (Twenty-Eight) Days. For injection cyanocobalomin, Dx vit B12 deficiency. 5/24/22   Kimberly Ferguson APRN       amLODIPine, 5 mg, Oral, Daily  ARIPiprazole, 5 mg, Oral, Daily  aspirin EC, 325 mg, Oral, Daily  atorvastatin, 80 mg, Oral, Daily  Bag Balm, 1 application, Topical, BID  clopidogrel, 75 mg, Oral, Daily  cyanocobalamin, 1,000 mcg, Intramuscular, Q28 Days  folic acid, 1,000 mcg, Oral, Daily  furosemide, 40 mg, Oral, Daily  gabapentin, 200 mg, Oral, Q12H  heparin (porcine), 5,000 Units, Subcutaneous, Q8H  hydroCHLOROthiazide, 12.5 mg, Oral, Daily  Insulin Aspart, 0-7 Units, Subcutaneous, TID AC  Insulin Aspart, 12 Units, Subcutaneous, TID With Meals  insulin detemir, 30 Units, Subcutaneous, Daily  insulin detemir, 45 Units, Subcutaneous, Nightly  isosorbide mononitrate, 30 mg, Oral, Daily  levothyroxine, 50 mcg, Oral, QAM  pantoprazole, 40 mg, Oral, Daily  ranolazine, 500 mg, Oral, Q12H  senna-docusate sodium, 2 tablet, Oral, BID  sodium chloride, 10 mL, Intravenous, Q12H  sodium chloride, 10 mL, Intravenous, Q12H  sucralfate, 1 g, Oral, 4x Daily  tamsulosin, 0.4 mg, Oral, Daily  venlafaxine XR, 75 mg, Oral, Daily With Breakfast           Objective  "   Temp:  [95.7 °F (35.4 °C)-97.3 °F (36.3 °C)] 96.9 °F (36.1 °C)  Heart Rate:  [73-81] 76  Resp:  [16-18] 18  BP: ()/(52-60) 97/55    Physical Exam  Constitutional:       Appearance: She is obese.   HENT:      Nose: Nose normal.   Eyes:      Extraocular Movements: Extraocular movements intact.   Cardiovascular:      Rate and Rhythm: Regular rhythm.      Heart sounds: Normal heart sounds.   Pulmonary:      Breath sounds: Normal breath sounds.   Abdominal:      General: Bowel sounds are normal.      Palpations: Abdomen is soft.   Musculoskeletal:         General: Normal range of motion.      Cervical back: Normal range of motion and neck supple.      Comments: Left BKA, old   Skin:     General: Skin is warm.      Comments: Right distal leg with some scaling skin, likely due to her resolving cellulitis   Neurological:      General: No focal deficit present.      Mental Status: She is alert. Mental status is at baseline.   Psychiatric:         Behavior: Behavior normal.       Results Review:  I have reviewed the labs, radiology results, and diagnostic studies.    Laboratory Data:         Invalid input(s): LABALBU, PROT  Estimated Creatinine Clearance: 86.3 mL/min (by C-G formula based on SCr of 0.92 mg/dL).          Results from last 7 days   Lab Units 06/07/23  0550 06/06/23  0536 06/05/23  0532 06/04/23  0540 06/03/23  0623   WBC 10*3/mm3 8.28 7.20 12.17* 10.39 9.13   HEMOGLOBIN g/dL 12.0 12.0 11.7* 11.8* 11.8*   HEMATOCRIT % 35.5 35.6 34.7 36.1 36.8   PLATELETS 10*3/mm3 415 418 396 431 423             Culture Data:   No results found for: BLOODCX  No results found for: URINECX  No results found for: RESPCX  No results found for: WOUNDCX  No results found for: STOOLCX  No components found for: BODYFLD    Radiology Data:   Imaging Results (Last 24 Hours)       Procedure Component Value Units Date/Time    US Venous Doppler Upper Extremity Left (duplex) [[id number]] Collected: 06/07/23 0523     Updated: 06/07/23  0527    Narrative:      INDICATION:  L arm pain, midline in place, check for clot    COMPARISON:  None.    TECHNIQUE:  Ultrasound examination of the left upper extremity veins using high-resolution  gray-scale images with and without graded compression, where appropriate, and  color and spectral Doppler images.      Impression:      FINDINGS/IMPRESSION:  No evidence of deep vein thrombosis in the left upper extremity.              I have reviewed the patient's current medications.     Assessment/Plan   Right lower leg cellulitis     With US venous doppler negative for DVT     Was on vanco and zosyn; resolved     Sepsis  (POA)     With fever, leukocytosis and left shifting      Resolved     Physical deconditioning     PT and OT on the case     CM working on placement to ARU but she was denied; she would like to try SNFs and if that is not an option, she is willing to go back home with PT and OT    Urinary retention     During her hospitalization: resolved       Chronic medical problems     Essential hypertension     CAD     Type II DM, uncontrolled with A1C of 9.8     Bipolar disorder     Hyperlipidemia     Left BKA; uses prosthesis    I adjusted her pain meds          Medical Decision Making  Number and Complexity of problems: one major complex  Differential Diagnosis: pneumonia    Conditions and Status:        Condition is improving.       Discussed with: patient     Treatment Plan  See above    Care Planning  Shared decision making: her  Nikia  Code status and discussions: full code    Disposition  Social Determinants of Health that impact treatment or disposition: none  I expect the patient to be discharged to  rehab unit in 1-2 days or when accepted     I confirmed that the patient's Advance Care Plan is present, code status is documented, or surrogate decision maker is listed in the patient's medical record.     Mine Rodarte MD

## 2023-06-07 NOTE — PLAN OF CARE
Goal Outcome Evaluation:  Plan of Care Reviewed With: patient           Outcome Evaluation: nursingok'd OT pt agreeable but reports not feeling well l ue pain participated in ex to ru as becki. cont poc recommend 24/7 A and cont ot at next level of care

## 2023-06-07 NOTE — PLAN OF CARE
Goal Outcome Evaluation:           Progress: improving     VSS. Pain controled with PRN meds. Meds given per order. C/o pain in ULE. MD notified. Venous ultrasound ordered. Awaiting results.

## 2023-06-07 NOTE — THERAPY TREATMENT NOTE
Patient Name: Ariana Martinez  : 1960    MRN: [id number]                              Today's Date: 2023       Admit Date: 2023    Visit Dx:     ICD-10-CM ICD-9-CM   1. Sepsis without acute organ dysfunction, due to unspecified organism  A41.9 038.9     995.91   2. Cellulitis of right lower extremity  L03.115 682.6   3. Type 2 diabetes mellitus with hyperglycemia, unspecified whether long term insulin use  E11.65 250.00   4. Impaired mobility and activities of daily living  Z74.09 V49.89    Z78.9    5. Impaired physical mobility  Z74.09 781.99   6. Impaired mobility and ADLs  Z74.09 V49.89    Z78.9    7. Urinary retention  R33.9 788.20     Patient Active Problem List   Diagnosis    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, with long-term current use of insulin    Closed nondisplaced fracture of fifth left metatarsal bone    MAURICIO (generalized anxiety disorder)    Depression, major, recurrent, moderate (HCC)    GERD without esophagitis    Long term prescription opiate use    Mixed hyperlipidemia    Vitamin D deficiency    Seasonal allergic rhinitis    Restrictive lung disease secondary to obesity    Adult body mass index 37.0-37.9    Snoring    Class 3 severe obesity due to excess calories without serious comorbidity with body mass index (BMI) of 40.0 to 44.9 in adult (HCC)    (HFpEF) heart failure with preserved ejection fraction    Type 2 diabetes mellitus with hyperglycemia, with long-term current use of insulin (HCC)    Cyanocobalamin deficiency    Coronary artery disease of native artery of native heart with stable angina pectoris    Hypertension    Meniere's disease    Gastroparesis    Pulmonary hypertension (HCC)    Pes cavus    Primary osteoarthritis involving multiple joints    Generalized anxiety disorder    Chronic right-sided low back pain with right-sided sciatica    Chest pain    Adverse effect of iron    Chest pain due to myocardial ischemia    Nonrheumatic  tricuspid valve regurgitation    Iron deficiency anemia due to chronic blood loss    Malaise and fatigue    Ankle arthritis    Urinary retention    Endocarditis    Essential hypertension    Occlusion and stenosis of bilateral carotid arteries    S/P BKA (below knee amputation) unilateral, left (HCC)    Phantom pain after amputation of lower extremity    S/P CABG (coronary artery bypass graft)    Severe malnutrition    TIA (transient ischemic attack)    Coronary artery abnormality    Elevated d-dimer    Neuropathy    Chest pain, unspecified type    Acute pain of right shoulder    Rotator cuff syndrome, right    Acquired hypothyroidism    Bilateral leg edema    Left elbow pain    Gastritis    Olecranon bursitis, left elbow    Sepsis without acute organ dysfunction, due to unspecified organism     Past Medical History:   Diagnosis Date    Acute bacterial endocarditis 3/13/2021    Angina, class IV     Anxiety     Anxiety and depression     Arthritis     Benign paroxysmal positional vertigo     Bladder disorder     has bladder stimulator    Carpal tunnel syndrome     CHF (congestive heart failure)     Chronic pain     Coronary atherosclerosis     hx CABG 2005.  is followed by Dr Cher Amaya     Diabetes mellitus     Type 2, controlled    Diabetic polyneuropathy     Disease of thyroid gland     Elevated cholesterol     Female stress incontinence     Foot pain, left     Full dentures     Gastroparesis     GERD (gastroesophageal reflux disease)     Hyperlipidemia     Hypertension     Low back pain     Malaise and fatigue     Multiple joint pain     Obesity     Refuses to be weighed    Occlusion and stenosis of bilateral carotid arteries 6/18/2021    Otalgia     Both    Perforation of tympanic membrane     Left    Postoperative wound infection     Vitamin D deficiency     Wears glasses     reading     Past Surgical History:   Procedure Laterality Date    ABDOMINAL SURGERY      AMPUTATION FOOT      ANGIOPLASTY       coronary    ANKLE ARTHROSCOPY Left 02/26/2021    Procedure: Left foot hardware removal, ankle arthroscopy, bone grafting , left foot exostectomy;  Surgeon: Ignacio Lord DPM;  Location: [facility] OR;  Service: Podiatry;  Laterality: Left;    BREAST BIOPSY Right     CARDIAC CATHETERIZATION      CARDIAC CATHETERIZATION N/A 06/20/2017    Procedure: Right Heart Cath;  Surgeon: Can Kwon MD PhD;  Location: [facility] CATH INVASIVE LOCATION;  Service:     CARDIAC CATHETERIZATION N/A 02/18/2020    Procedure: Left Heart Cath;  Surgeon: Catalina Cooper MD;  Location: [facility] CATH INVASIVE LOCATION;  Service: Cardiology;  Laterality: N/A;    CARDIAC CATHETERIZATION N/A 04/06/2022    Procedure: Left Heart Cath;  Surgeon: Sheryl Navas MD;  Location: [facility] CATH INVASIVE LOCATION;  Service: Cardiology;  Laterality: N/A;    CARPAL TUNNEL RELEASE      CHOLECYSTECTOMY      COLONOSCOPY N/A 06/24/2020    Procedure: COLONOSCOPY;  Surgeon: Julián Maldonado MD;  Location: [facility] ENDOSCOPY;  Service: Gastroenterology;  Laterality: N/A;    CORONARY ARTERY BYPASS GRAFT  2005    CYSTOSCOPY N/A 5/27/2023    Procedure: FLEXIBLE CYSTOSCOPY;  Surgeon: Rohan Doe MD;  Location: [facility] OR;  Service: Urology;  Laterality: N/A;    ENDOSCOPY N/A 10/19/2018    Procedure: ESOPHAGOGASTRODUODENOSCOPY possible dilation;  Surgeon: Julián Maldonado MD;  Location: [facility] ENDOSCOPY;  Service: Gastroenterology    ENDOSCOPY N/A 06/24/2020    Procedure: ESOPHAGOGASTRODUODENOSCOPY WED appt please;  Surgeon: Julián Maldonado MD;  Location: [facility] ENDOSCOPY;  Service: Gastroenterology;  Laterality: N/A;    ENDOSCOPY N/A 06/10/2022    Procedure: ESOPHAGOGASTRODUODENOSCOPY   room 380;  Surgeon: Jeremiah Wilkins MD;  Location: [facility] ENDOSCOPY;  Service: Gastroenterology;  Laterality: N/A;    ENDOSCOPY N/A 1/10/2023    Procedure: ESOPHAGOGASTRODUODENOSCOPY;  Surgeon: Jeremiah Wilkins MD;  Location: [facility] ENDOSCOPY;  Service:  Gastroenterology;  Laterality: N/A;    ENDOSCOPY AND COLONOSCOPY      FOOT SURGERY      Toes    FOOT SURGERY      GASTRIC BANDING      Revision, laparoscopic    HYSTERECTOMY      INCISION AND DRAINAGE LEG Left 03/12/2021    Procedure: Left ankle arthroscopic irrigation and debridement, screw removal;  Surgeon: Ignacio Lord DPM;  Location: [facility];  Service: Podiatry;  Laterality: Left;    MOUTH SURGERY      SALPINGO OOPHORECTOMY      SHOULDER SURGERY      SUBTALAR ARTHRODESIS Left 01/16/2019    Procedure: LEFT FOOT HARDWARE REMOVAL, FIFTH METATARSAL , OPEN REDUCTION INTERNAL FIXATION, CALCANEAL OSTEOTOMY;  Surgeon: Ignacio Lord DPM;  Location: [facility];  Service: Podiatry    SUBTALAR ARTHRODESIS Left 10/16/2019    Procedure: foot hardware removal, subtalar joint fusion  possible de/reattachment of achilles tendon        (c-arm);  Surgeon: Ignacio Lord DPM;  Location: [facility];  Service: Podiatry    SUBTALAR ARTHRODESIS Left 09/30/2020    Procedure: subtalar, talonavicular joint arthrodesis.  Removal hardware.          (c-arm);  Surgeon: Ignacio Lord DPM;  Location: [facility];  Service: Podiatry;  Laterality: Left;    TRANSESOPHAGEAL ECHOCARDIOGRAM (LAMONTE)      With color flow      General Information       Row Name 06/07/23 1343          OT Time and Intention    Document Type therapy note (daily note)  -RC     Mode of Treatment individual therapy;occupational therapy  -       Row Name 06/07/23 1343          General Information    Patient Profile Reviewed yes  -RC     Existing Precautions/Restrictions fall  -RC       Row Name 06/07/23 1343          Cognition    Orientation Status (Cognition) oriented x 4  -RC       Row Name 06/07/23 1346          Safety Issues, Functional Mobility    Impairments Affecting Function (Mobility) balance;endurance/activity tolerance;pain  -RC               User Key  (r) = Recorded By, (t) = Taken By, (c) = Cosigned By      Initials Name Provider Type    RC  Corina Morillo COTA Occupational Therapist Assistant                     Mobility/ADL's    No documentation.                  Obj/Interventions    No documentation.                  Goals/Plan       Row Name 06/07/23 1343          Transfer Goal 1 (OT)    Activity/Assistive Device (Transfer Goal 1, OT) toilet;wheelchair transfer  -RC     [place] Level/Cues Needed (Transfer Goal 1, OT) modified independence  -RC     Time Frame (Transfer Goal 1, OT) long term goal (LTG)  -RC     Progress/Outcome (Transfer Goal 1, OT) goal not met  -RC       Row Name 06/07/23 1343          Bathing Goal 1 (OT)    Activity/Device (Bathing Goal 1, OT) bathing skills, all  -RC     [place] Level/Cues Needed (Bathing Goal 1, OT) modified independence  -RC     Time Frame (Bathing Goal 1, OT) long term goal (LTG)  -RC     Progress/Outcomes (Bathing Goal 1, OT) goal not met  -RC       Row Name 06/07/23 1343          Dressing Goal 1 (OT)    Activity/Device (Dressing Goal 1, OT) lower body dressing  -RC     [place]/Cues Needed (Dressing Goal 1, OT) modified independence  -RC     Time Frame (Dressing Goal 1, OT) long term goal (LTG)  -RC     Progress/Outcome (Dressing Goal 1, OT) goal not met  -RC       Row Name 06/07/23 1343          Toileting Goal 1 (OT)    Activity/Device (Toileting Goal 1, OT) toileting skills, all  -RC     [place] Level/Cues Needed (Toileting Goal 1, OT) modified independence  -RC     Time Frame (Toileting Goal 1, OT) long term goal (LTG)  -RC     Progress/Outcome (Toileting Goal 1, OT) goal not met  -RC       Row Name 06/07/23 1343          Self-Feeding Goal 1 (OT)    Activity/Device (Self-Feeding Goal 1, OT) self-feeding skills, all  -RC     [place] Level/Cues Needed (Self-Feeding Goal 1, OT) modified independence  -RC     Time Frame (Self-Feeding Goal 1, OT) long term goal (LTG)  -RC     Progress/Outcomes (Self-Feeding Goal 1, OT) goal met  -RC               User Key  (r) = Recorded By, (t) =  Taken By, (c) = Cosigned By      Initials Name Provider Type    Corina Scott COTA Occupational Therapist Assistant                   Clinical Impression       Row Name 06/07/23 1343          Pain Assessment    Pretreatment Pain Rating 8/10  -RC     Posttreatment Pain Rating 8/10  -RC     Pain Location - Side/Orientation Left  -RC     Pain Location upper  -RC     Pain Location - extremity  -RC     Pain Intervention(s) Medication (See MAR);Repositioned  -RC       Row Name 06/07/23 1343          Therapy Assessment/Plan (OT)    Rehab Potential (OT) good, to achieve stated therapy goals  -     Criteria for Skilled Therapeutic Interventions Met (OT) yes;skilled treatment is necessary  -RC     Therapy Frequency (OT) other (see comments)  5-7 d/wk  -RC       Row Name 06/07/23 1343          Therapy Plan Review/Discharge Plan (OT)    Anticipated Discharge Disposition (OT) inpatient rehabilitation facility;skilled nursing facility  -               User Key  (r) = Recorded By, (t) = Taken By, (c) = Cosigned By      Initials Name Provider Type     Corina Morillo COTA Occupational Therapist Assistant                   Outcome Measures       Row Name 06/07/23 1343          How much help from another is currently needed...    Putting on and taking off regular lower body clothing? 3  -RC     Bathing (including washing, rinsing, and drying) 3  -RC     Toileting (which includes using toilet bed pan or urinal) 3  -RC     Putting on and taking off regular upper body clothing 4  -RC     Taking care of personal grooming (such as brushing teeth) 4  -RC     Eating meals 4  -RC     AM-PAC 6 Clicks Score (OT) 21  -RC       Row Name 06/07/23 1256 06/07/23 0812       How much help from another person do you currently need...    Turning from your back to your side while in flat bed without using bedrails? 3  -AME 3  -LH    Moving from lying on back to sitting on the side of a flat bed without bedrails? 3  -AME 3  -LH    Moving to  and from a bed to a chair (including a wheelchair)? 3  -AME 3  -LH    Standing up from a chair using your arms (e.g., wheelchair, bedside chair)? 3  -AME 3  -LH    Climbing 3-5 steps with a railing? 2  -AME 2  -LH    To walk in hospital room? 3  -AME 3  -LH    AM-PAC 6 Clicks Score (PT) 17  -AME 17  -    Highest level of mobility 5 --> Static standing  - 5 --> Static standing  -      Row Name 06/07/23 1343 06/07/23 1256       Functional Assessment    Outcome Measure Options AM-PAC 6 Clicks Daily Activity (OT)  - AM-PAC 6 Clicks Basic Mobility (PT)  -              User Key  (r) = Recorded By, (t) = Taken By, (c) = Cosigned By      Initials Name Provider Type    Ousmane Gomes, PTA Physical Therapist Assistant    RC Corina Morillo COTA Occupational Therapist Assistant     Juany Singh, RN Registered Nurse                    Occupational Therapy Education       Title: PT OT SLP Therapies (In Progress)       Topic: Occupational Therapy (Done)       Point: ADL training (Done)       Description:   Instruct learner(s) on proper safety adaptation and remediation techniques during self care or transfers.   Instruct in proper use of assistive devices.                  Learning Progress Summary             Patient Acceptance, E,TB, VU by LW at 6/4/2023 1458    Acceptance, E, VU by AB at 6/1/2023 0535    Acceptance, E, VU by AB at 5/31/2023 0519    Acceptance, E,TB, VU by  at 5/30/2023 1133    Comment: OT role, POC, t/f training    Acceptance, E, VU by AB at 5/30/2023 0516    Acceptance, E,TB, VU by LW at 5/26/2023 1516    Acceptance, E,TB, VU by LW at 5/25/2023 1459    Acceptance, E,TB, VU by RW at 5/22/2023 1243    Comment: POC, Role of OT    Acceptance, E,TB, VU by BB at 5/20/2023 1358    Acceptance, E,TB, VU by BB at 5/20/2023 1357                         Point: Home exercise program (Done)       Description:   Instruct learner(s) on appropriate technique for monitoring, assisting and/or progressing  therapeutic exercises/activities.                  Learning Progress Summary             Patient Acceptance, E,TB, VU by LW at 6/4/2023 1458    Acceptance, E, VU by AB at 6/1/2023 0535    Acceptance, E, VU by AB at 5/31/2023 0519    Acceptance, E, VU by AB at 5/30/2023 0516    Acceptance, E,TB, VU by LW at 5/26/2023 1516    Acceptance, E,TB, VU by LW at 5/25/2023 1459    Acceptance, E,TB, VU by BB at 5/20/2023 1356                         Point: Precautions (Done)       Description:   Instruct learner(s) on prescribed precautions during self-care and functional transfers.                  Learning Progress Summary             Patient Acceptance, E,TB, VU by LW at 6/4/2023 1458    Acceptance, E, VU by AB at 6/1/2023 0535    Acceptance, E, VU by AB at 5/31/2023 0519    Acceptance, E,TB, VU by MC at 5/30/2023 1133    Comment: OT role, POC, t/f training    Acceptance, E, VU by AB at 5/30/2023 0516    Acceptance, E,TB, VU by LW at 5/26/2023 1516    Acceptance, E,TB, VU by LW at 5/25/2023 1459    Acceptance, E,TB, VU by RW at 5/22/2023 1243    Comment: POC, Role of OT    Acceptance, E, DU by RB at 5/18/2023 1352    Comment: Pt edu on use of gait belt and non skid socks when OOB and no OOB without assist.                         Point: Body mechanics (Done)       Description:   Instruct learner(s) on proper positioning and spine alignment during self-care, functional mobility activities and/or exercises.                  Learning Progress Summary             Patient Acceptance, E,TB, VU by LW at 6/4/2023 1458    Acceptance, E, VU by AB at 6/1/2023 0535    Acceptance, E, VU by AB at 5/31/2023 0519    Acceptance, E,TB, VU by MC at 5/30/2023 1133    Comment: OT role, POC, t/f training    Acceptance, E, VU by AB at 5/30/2023 0516    Acceptance, E,TB, VU by LW at 5/26/2023 1516    Acceptance, E,TB, VU by LW at 5/25/2023 1459    Acceptance, E,TB, VU by RW at 5/22/2023 1243    Comment: POC, Role of OT    Acceptance, E,TB, VU by  BB at 5/20/2023 1358    Acceptance, E,TB, VU by BB at 5/20/2023 1357                                         User Key       Initials Effective Dates Name Provider Type Discipline    RB 06/16/21 - 05/30/23 Fredi France, OT Occupational Therapist OT    BB 06/16/21 -  Matilde Rios COTA Occupational Therapist Assistant OT    LW 06/16/21 -  Sarah Irby COTA Occupational Therapist Assistant OT    MC 10/19/22 -  Dana Burger, OT Occupational Therapist OT    RW 09/22/22 -  Missy Esteves, OT Occupational Therapist OT    AB 11/07/22 -  Joana Mc LPN Licensed Nurse Nurse                  OT Recommendation and Plan  Therapy Frequency (OT): other (see comments) (5-7 d/wk)  Plan of Care Review  Plan of Care Reviewed With: patient  Outcome Evaluation: nursingok'd OT pt agreeable but reports not feeling well l ue pain participated in ex to ru as becki. cont poc recommend 24/7 A and cont ot at next level of care     Time Calculation:    Time Calculation- OT       Row Name 06/07/23 1449             Time Calculation- OT    OT Start Time 1343  -RC      OT Stop Time 1403  -RC      OT Time Calculation (min) 20 min  -RC      Total Timed Code Minutes- OT 20 minute(s)  -RC         Timed Charges    09564 - OT Therapeutic Exercise Minutes 20  -RC         Total Minutes    Timed Charges Total Minutes 20  -RC       Total Minutes 20  -RC                User Key  (r) = Recorded By, (t) = Taken By, (c) = Cosigned By      Initials Name Provider Type    RC Corina Morillo COTA Occupational Therapist Assistant                  Therapy Charges for Today       Code Description Service Date Service Provider Modifiers Qty    [id number] HC OT THER PROC EA 15 MIN 6/7/2023 Corina Morillo COTA GO 1                 LINDA Whyte  6/7/2023

## 2023-06-08 VITALS
HEART RATE: 79 BPM | SYSTOLIC BLOOD PRESSURE: 124 MMHG | DIASTOLIC BLOOD PRESSURE: 60 MMHG | OXYGEN SATURATION: 94 % | RESPIRATION RATE: 18 BRPM | WEIGHT: 258.3 LBS | TEMPERATURE: 97.1 F | BODY MASS INDEX: 39.15 KG/M2 | HEIGHT: 68 IN

## 2023-06-08 LAB
BASOPHILS # BLD AUTO: 0.05 10*3/MM3 (ref 0–0.2)
BASOPHILS NFR BLD AUTO: 0.6 % (ref 0–1.5)
DEPRECATED RDW RBC AUTO: 44.4 FL (ref 37–54)
EOSINOPHIL # BLD AUTO: 0.29 10*3/MM3 (ref 0–0.4)
EOSINOPHIL NFR BLD AUTO: 3.7 % (ref 0.3–6.2)
ERYTHROCYTE [DISTWIDTH] IN BLOOD BY AUTOMATED COUNT: 13.9 % (ref 12.3–15.4)
GLUCOSE BLDC GLUCOMTR-MCNC: 242 MG/DL (ref 70–130)
GLUCOSE BLDC GLUCOMTR-MCNC: 319 MG/DL (ref 70–130)
HCT VFR BLD AUTO: 38.4 % (ref 34–46.6)
HGB BLD-MCNC: 12.5 G/DL (ref 12–15.9)
HOLD SPECIMEN: NORMAL
IMM GRANULOCYTES # BLD AUTO: 0.03 10*3/MM3 (ref 0–0.05)
IMM GRANULOCYTES NFR BLD AUTO: 0.4 % (ref 0–0.5)
LYMPHOCYTES # BLD AUTO: 1.54 10*3/MM3 (ref 0.7–3.1)
LYMPHOCYTES NFR BLD AUTO: 19.8 % (ref 19.6–45.3)
MCH RBC QN AUTO: 28.5 PG (ref 26.6–33)
MCHC RBC AUTO-ENTMCNC: 32.6 G/DL (ref 31.5–35.7)
MCV RBC AUTO: 87.7 FL (ref 79–97)
MONOCYTES # BLD AUTO: 0.63 10*3/MM3 (ref 0.1–0.9)
MONOCYTES NFR BLD AUTO: 8.1 % (ref 5–12)
NEUTROPHILS NFR BLD AUTO: 5.24 10*3/MM3 (ref 1.7–7)
NEUTROPHILS NFR BLD AUTO: 67.4 % (ref 42.7–76)
NRBC BLD AUTO-RTO: 0 /100 WBC (ref 0–0.2)
PLATELET # BLD AUTO: 406 10*3/MM3 (ref 140–450)
PMV BLD AUTO: 9.7 FL (ref 6–12)
RBC # BLD AUTO: 4.38 10*6/MM3 (ref 3.77–5.28)
WBC NRBC COR # BLD: 7.78 10*3/MM3 (ref 3.4–10.8)

## 2023-06-08 PROCEDURE — 82948 REAGENT STRIP/BLOOD GLUCOSE: CPT

## 2023-06-08 PROCEDURE — 63710000001 INSULIN DETEMIR PER 5 UNITS: Performed by: HOSPITALIST

## 2023-06-08 PROCEDURE — 97535 SELF CARE MNGMENT TRAINING: CPT

## 2023-06-08 PROCEDURE — 25010000002 HEPARIN (PORCINE) PER 1000 UNITS: Performed by: UROLOGY

## 2023-06-08 PROCEDURE — 85025 COMPLETE CBC W/AUTO DIFF WBC: CPT | Performed by: HOSPITALIST

## 2023-06-08 PROCEDURE — 63710000001 INSULIN ASPART PER 5 UNITS: Performed by: INTERNAL MEDICINE

## 2023-06-08 PROCEDURE — 97110 THERAPEUTIC EXERCISES: CPT

## 2023-06-08 PROCEDURE — 25010000002 ONDANSETRON PER 1 MG: Performed by: UROLOGY

## 2023-06-08 RX ORDER — TAMSULOSIN HYDROCHLORIDE 0.4 MG/1
0.4 CAPSULE ORAL DAILY
Qty: 30 CAPSULE | Refills: 0 | Status: SHIPPED | OUTPATIENT
Start: 2023-06-09

## 2023-06-08 RX ORDER — HYDROCODONE BITARTRATE AND ACETAMINOPHEN 7.5; 325 MG/1; MG/1
1 TABLET ORAL EVERY 6 HOURS PRN
Qty: 12 TABLET | Refills: 0 | Status: SHIPPED | OUTPATIENT
Start: 2023-06-08 | End: 2023-06-14

## 2023-06-08 RX ORDER — INSULIN ASPART 100 [IU]/ML
12 INJECTION, SOLUTION INTRAVENOUS; SUBCUTANEOUS
Qty: 30 ML | Refills: 12 | Status: SHIPPED | OUTPATIENT
Start: 2023-06-08

## 2023-06-08 RX ORDER — GABAPENTIN 100 MG/1
200 CAPSULE ORAL EVERY 12 HOURS
Qty: 6 CAPSULE | Refills: 0 | Status: SHIPPED | OUTPATIENT
Start: 2023-06-08

## 2023-06-08 RX ORDER — AMLODIPINE BESYLATE 5 MG/1
5 TABLET ORAL DAILY
Qty: 30 TABLET | Refills: 0 | Status: SHIPPED | OUTPATIENT
Start: 2023-06-08

## 2023-06-08 RX ORDER — INSULIN ASPART 100 [IU]/ML
0-7 INJECTION, SOLUTION INTRAVENOUS; SUBCUTANEOUS
Qty: 30 ML | Refills: 12 | Status: SHIPPED | OUTPATIENT
Start: 2023-06-08

## 2023-06-08 RX ADMIN — LORAZEPAM 0.5 MG: 0.5 TABLET ORAL at 04:50

## 2023-06-08 RX ADMIN — ISOSORBIDE MONONITRATE 30 MG: 30 TABLET, EXTENDED RELEASE ORAL at 08:01

## 2023-06-08 RX ADMIN — ATORVASTATIN CALCIUM 80 MG: 40 TABLET, FILM COATED ORAL at 08:01

## 2023-06-08 RX ADMIN — INSULIN ASPART 5 UNITS: 100 INJECTION, SOLUTION INTRAVENOUS; SUBCUTANEOUS at 07:41

## 2023-06-08 RX ADMIN — PANTOPRAZOLE SODIUM 40 MG: 40 TABLET, DELAYED RELEASE ORAL at 08:01

## 2023-06-08 RX ADMIN — HEPARIN SODIUM 5000 UNITS: 5000 INJECTION INTRAVENOUS; SUBCUTANEOUS at 05:49

## 2023-06-08 RX ADMIN — SUCRALFATE 1 G: 1 TABLET ORAL at 11:03

## 2023-06-08 RX ADMIN — RANOLAZINE 500 MG: 500 TABLET, FILM COATED, EXTENDED RELEASE ORAL at 08:01

## 2023-06-08 RX ADMIN — Medication 1 APPLICATION: at 08:01

## 2023-06-08 RX ADMIN — ASPIRIN 325 MG: 325 TABLET, COATED ORAL at 08:01

## 2023-06-08 RX ADMIN — ARIPIPRAZOLE 5 MG: 5 TABLET ORAL at 08:01

## 2023-06-08 RX ADMIN — INSULIN ASPART 12 UNITS: 100 INJECTION, SOLUTION INTRAVENOUS; SUBCUTANEOUS at 11:02

## 2023-06-08 RX ADMIN — AMLODIPINE BESYLATE 5 MG: 5 TABLET ORAL at 08:01

## 2023-06-08 RX ADMIN — LEVOTHYROXINE SODIUM 50 MCG: 50 TABLET ORAL at 05:49

## 2023-06-08 RX ADMIN — INSULIN DETEMIR 30 UNITS: 100 INJECTION, SOLUTION SUBCUTANEOUS at 08:00

## 2023-06-08 RX ADMIN — FOLIC ACID 1000 MCG: 1 TABLET ORAL at 08:01

## 2023-06-08 RX ADMIN — ONDANSETRON 4 MG: 2 INJECTION INTRAMUSCULAR; INTRAVENOUS at 04:50

## 2023-06-08 RX ADMIN — CLOPIDOGREL BISULFATE 75 MG: 75 TABLET ORAL at 08:01

## 2023-06-08 RX ADMIN — SUCRALFATE 1 G: 1 TABLET ORAL at 08:01

## 2023-06-08 RX ADMIN — VENLAFAXINE HYDROCHLORIDE 75 MG: 75 CAPSULE, EXTENDED RELEASE ORAL at 08:01

## 2023-06-08 RX ADMIN — TAMSULOSIN HYDROCHLORIDE 0.4 MG: 0.4 CAPSULE ORAL at 08:01

## 2023-06-08 RX ADMIN — HYDROCHLOROTHIAZIDE 12.5 MG: 12.5 TABLET ORAL at 08:01

## 2023-06-08 RX ADMIN — INSULIN ASPART 12 UNITS: 100 INJECTION, SOLUTION INTRAVENOUS; SUBCUTANEOUS at 07:41

## 2023-06-08 RX ADMIN — GABAPENTIN 200 MG: 100 CAPSULE ORAL at 08:01

## 2023-06-08 RX ADMIN — INSULIN ASPART 7 UNITS: 100 INJECTION, SOLUTION INTRAVENOUS; SUBCUTANEOUS at 10:59

## 2023-06-08 RX ADMIN — FUROSEMIDE 40 MG: 40 TABLET ORAL at 08:01

## 2023-06-08 NOTE — DISCHARGE SUMMARY
[facility] Medicine Services    DISCHARGE SUMMARY       Date of Admission: 5/17/2023  Date of Discharge:  6/8/2023  Primary Care Physician: Dolly Foss APRN    Presenting Problem/History of Present Illness:  Cellulitis of right lower extremity [L03.115]  Type 2 diabetes mellitus with hyperglycemia, unspecified whether long term insulin use [E11.65]  Sepsis without acute organ dysfunction, due to unspecified organism [A41.9]       Final Discharge Diagnoses:  Active Hospital Problems    Diagnosis     **Sepsis without acute organ dysfunction, due to unspecified organism     Urinary retention        Consults:   Consults       Date and Time Order Name Status Description    5/22/2023  2:14 PM Inpatient Urology Consult      5/17/2023 11:01 AM Hospitalist (on-call MD unless specified)              Procedures Performed: Procedure(s):  FLEXIBLE CYSTOSCOPY                Pertinent Test Results:   Imaging Results (All)       Procedure Component Value Units Date/Time    US Venous Doppler Upper Extremity Left (duplex) [[id number]] Collected: 06/07/23 0523     Updated: 06/07/23 0527    Narrative:      INDICATION:  L arm pain, midline in place, check for clot    COMPARISON:  None.    TECHNIQUE:  Ultrasound examination of the left upper extremity veins using high-resolution  gray-scale images with and without graded compression, where appropriate, and  color and spectral Doppler images.      Impression:      FINDINGS/IMPRESSION:  No evidence of deep vein thrombosis in the left upper extremity.      XR Abdomen KUB [[id number]] Collected: 05/24/23 1045     Updated: 05/24/23 1053    Narrative:      FINDINGS/    Impression:      Two (2) views of the abdomen and pelvis demonstrate significant stool throughout  the entire colon.    There is a presacral stimulator lead in place.    No other significant finding.  No active disease.    US Guided Vascular Access [[id number]] Collected:  05/22/23 0812     Updated: 05/22/23 0815    Narrative:      Ultrasound guidance vascular    FINDINGS:  Real-time ultrasound imaging was provided for vascular access.  Please refer to  procedure note for real-time exam findings.  The left basilicVein was found to  be patent and compressible.  Access under direct sonographic visualization.  Permanent saved images sent to the PACS for documentation.      IR Insert Midline Without Port Pump 5 Plus [[id number]] Resulted: 05/22/23 0802     Updated: 05/22/23 0802    Narrative:      This procedure was auto-finalized with no dictation required.    US Venous Doppler Upper Extremity Right (duplex) [[id number]] Collected: 05/21/23 1155     Updated: 05/21/23 1159    Narrative:      US VENOUS DOPPLER UPPER EXTREMITY RIGHT (DUPLEX)    HISTORY: RUE swelling and pain    COMPARISON: NONE    FINDINGS:  Transverse and longitudinal grayscale and Doppler sonographic images of the  right upper extremity were obtained. Spectral analysis was also obtained.    There is normal flow and compressibility of the right subclavian, axillary,  brachial, and forearm veins.    Thrombus is noted in the cephalic vein.      Impression:        1. No evidence of DVT in the right upper extremity.    2. Superficial venous thrombosis in the cephalic vein.        US Venous Doppler Lower Extremity Right (duplex) [[id number]] Collected: 05/19/23 0847     Updated: 05/19/23 1642    Narrative:        TECHNIQUE:  High-resolution gray scale imaging, color flow Doppler, compression were  performed from the groin to the calf of the right lower extremity.  Augmentation  was performed of the right common femoral vein and popliteal vein.    FINDINGS:  No evidence of DVT.  There is a 3.7 cm nonspecific right inguinal lymph node.  No other significant findings.    SUMMARY:  Right inguinal adenopathy.  No evidence of DVT.    US Guided Vascular Access [[id number]] Collected: 05/17/23 1139     Updated: 05/17/23 1143    Narrative:       Ultrasound guidance vascular    FINDINGS:  Real-time ultrasound imaging was provided for vascular access.  Please refer to  procedure note for real-time exam findings.  The right cephalic vein was found  to be patent and compressible.  Access under direct sonographic visualization.  Permanent saved images sent to the PACS for documentation.      IR Insert Midline Without Port Pump 5 Plus [[id number]] Resulted: 05/17/23 0944     Updated: 05/17/23 0944    Narrative:      This procedure was auto-finalized with no dictation required.    XR Chest 1 View [[id number]] Collected: 05/17/23 0830     Updated: 05/17/23 0932    Narrative:      HISTORY:  Dizziness and weakness.    FINDINGS:  No infiltrate, effusion, or pneumothorax is seen.  Heart size and pulmonary  vascularity are normal.  The lungs are clear.  The mediastinum, maria guadalupe, and  visualized osseous structures are unremarkable.      Impression:      No significant abnormality of the chest.      XR Tibia Fibula 2 View Right [[id number]] Collected: 05/17/23 0900     Updated: 05/17/23 0929    Narrative:      TECHNIQUE:  AP and lateral views of the right foreleg were obtained.    HISTORY:  Cellulitis.  Pain along the medial leg with redness and swelling.    COMPARISON:  None.    FINDINGS:  No displaced fracture is seen.  Joint spaces are well preserved.  There is no  appreciable soft tissue swelling.  No radiopaque foreign body or soft tissue  gas.  Mild vascular calcification.      Impression:      No acute radiographic abnormality.             Lab Results (last 48 hours)       Procedure Component Value Units Date/Time    POC Glucose Once [[id number]]  (Abnormal) Collected: 06/08/23 0726    Specimen: Blood Updated: 06/08/23 0847     Glucose 319 mg/dL      Comment: RN NotifiedOperator: [id number] [facility] HEATHERMeter ID: [id number]       Extra Tubes [[id number]] Collected: 06/08/23 0554    Specimen: Blood, Venous Line Updated: 06/08/23 0701    Narrative:      The following  orders were created for panel order Extra Tubes.  Procedure                               Abnormality         Status                     ---------                               -----------         ------                     Green Top (Gel)[[id number]]                                  Final result                 Please view results for these tests on the individual orders.    Green Top (Gel) [[id number]] Collected: 06/08/23 0554    Specimen: Blood Updated: 06/08/23 0701     Extra Tube Hold for add-ons.     Comment: Auto resulted.       CBC Auto Differential [[id number]]  (Normal) Collected: 06/08/23 0554    Specimen: Blood Updated: 06/08/23 0651     WBC 7.78 10*3/mm3      RBC 4.38 10*6/mm3      Hemoglobin 12.5 g/dL      Hematocrit 38.4 %      MCV 87.7 fL      MCH 28.5 pg      MCHC 32.6 g/dL      RDW 13.9 %      RDW-SD 44.4 fl      MPV 9.7 fL      Platelets 406 10*3/mm3      Neutrophil % 67.4 %      Lymphocyte % 19.8 %      Monocyte % 8.1 %      Eosinophil % 3.7 %      Basophil % 0.6 %      Immature Grans % 0.4 %      Neutrophils, Absolute 5.24 10*3/mm3      Lymphocytes, Absolute 1.54 10*3/mm3      Monocytes, Absolute 0.63 10*3/mm3      Eosinophils, Absolute 0.29 10*3/mm3      Basophils, Absolute 0.05 10*3/mm3      Immature Grans, Absolute 0.03 10*3/mm3      nRBC 0.0 /100 WBC     POC Glucose Once [[id number]]  (Abnormal) Collected: 06/07/23 1945    Specimen: Blood Updated: 06/08/23 0549     Glucose 242 mg/dL      Comment: RN NotifiedOperator: [id number] DAYANA JENNIFERMeter ID: [id number]       POC Glucose Once [[id number]]  (Abnormal) Collected: 06/07/23 1644    Specimen: Blood Updated: 06/07/23 1822     Glucose 182 mg/dL      Comment: RN NotifiedOperator: [id number] DAVID HEATHERMeter ID: [id number]       COVID PRE-OP / PRE-PROCEDURE SCREENING ORDER (NO ISOLATION) - Swab, Nasopharynx [[id number]]  (Normal) Collected: 06/07/23 1728    Specimen: Swab from Nasopharynx Updated: 06/07/23 3498    Narrative:      The  following orders were created for panel order COVID PRE-OP / PRE-PROCEDURE SCREENING ORDER (NO ISOLATION) - Swab, Nasopharynx.  Procedure                               Abnormality         Status                     ---------                               -----------         ------                     COVID-19,CEPHEID/JOVANI,CO...[[id number]]  Normal              Final result                 Please view results for these tests on the individual orders.    COVID-19,CEPHEID/JOVANI,COR/UMAIR/PAD/ZOILA/MAD IN-HOUSE(OR EMERGENT/ADD-ON),NP SWAB IN TRANSPORT MEDIA 3-4 HR TAT, RT-PCR - Swab, Nasopharynx [[id number]]  (Normal) Collected: 06/07/23 1728    Specimen: Swab from Nasopharynx Updated: 06/07/23 1759     COVID19 Not Detected    Narrative:      Fact sheet for providers: https://www.fda.gov/media/382142/download     Fact sheet for patients: https://www.fda.gov/media/482454/download  Fact sheet for providers: https://www.fda.gov/media/180258/download    Fact sheet for patients: https://www.fda.gov/media/629150/download    Test performed by PCR.    POC Glucose Once [[id number]]  (Abnormal) Collected: 06/07/23 1111    Specimen: Blood Updated: 06/07/23 1230     Glucose 326 mg/dL      Comment: RN NotifiedOperator: [id number] [employer] HEATHERMeter ID: [id number]       POC Glucose Once [[id number]]  (Abnormal) Collected: 06/07/23 0723    Specimen: Blood Updated: 06/07/23 0751     Glucose 322 mg/dL      Comment: RN NotifiedOperator: [id number] [employer] HEATHERMeter ID: [id number]       Extra Tubes [[id number]] Collected: 06/07/23 0550    Specimen: Blood, Venous Line Updated: 06/07/23 0700    Narrative:      The following orders were created for panel order Extra Tubes.  Procedure                               Abnormality         Status                     ---------                               -----------         ------                     Green Top (Gel)[[id number]]                                  Final result                 Please  view results for these tests on the individual orders.    Green Top (Gel) [[id number]] Collected: 06/07/23 0550    Specimen: Blood Updated: 06/07/23 0700     Extra Tube Hold for add-ons.     Comment: Auto resulted.       CBC Auto Differential [[id number]]  (Normal) Collected: 06/07/23 0550    Specimen: Blood Updated: 06/07/23 0631     WBC 8.28 10*3/mm3      RBC 4.16 10*6/mm3      Hemoglobin 12.0 g/dL      Hematocrit 35.5 %      MCV 85.3 fL      MCH 28.8 pg      MCHC 33.8 g/dL      RDW 13.8 %      RDW-SD 43.1 fl      MPV 9.3 fL      Platelets 415 10*3/mm3      Neutrophil % 66.0 %      Lymphocyte % 21.4 %      Monocyte % 8.2 %      Eosinophil % 3.5 %      Basophil % 0.5 %      Immature Grans % 0.4 %      Neutrophils, Absolute 5.47 10*3/mm3      Lymphocytes, Absolute 1.77 10*3/mm3      Monocytes, Absolute 0.68 10*3/mm3      Eosinophils, Absolute 0.29 10*3/mm3      Basophils, Absolute 0.04 10*3/mm3      Immature Grans, Absolute 0.03 10*3/mm3      nRBC 0.0 /100 WBC     POC Glucose Once [[id number]]  (Abnormal) Collected: 06/06/23 1913    Specimen: Blood Updated: 06/06/23 2033     Glucose 270 mg/dL      Comment: RN NotifiedOperator: [id number] SULTANA MIKAMeter ID: [id number]       POC Glucose Once [[id number]]  (Abnormal) Collected: 06/06/23 1621    Specimen: Blood Updated: 06/06/23 1635     Glucose 223 mg/dL      Comment: RN NotifiedOperator: [id number] ANAMARIA TAWNNEEMeter ID: [id number]       POC Glucose Once [[id number]]  (Abnormal) Collected: 06/06/23 1349    Specimen: Blood Updated: 06/06/23 1435     Glucose 321 mg/dL      Comment: RN NotifiedOperator: [id number] ANAMARIA TAWNNEEMeter ID: [id number]       POC Glucose Once [[id number]]  (Abnormal) Collected: 06/06/23 1036    Specimen: Blood Updated: 06/06/23 1408     Glucose 485 mg/dL      Comment: RN NotifiedOperator: [id number] ANAMARIA Delonger ID: [id number]                 Chief Complaint on Day of Discharge: weakness    Hospital Course:    Please see admission  "history and physical for patient's initial presentation. During this hospitalization, the following problems were addressed...     Right lower leg cellulitis     With US venous doppler negative for DVT     Was on vanco and zosyn; now off, resolved      Sepsis  (POA)     With fever, leukocytosis and left shifting      Resolved      Physical deconditioning     PT and OT on the case     CM working on placement to ARU but she was denied; she would like to try SNFs and if that is not an option, she is willing to go back home with PT and OT     Urinary retention     During her hospitalization: resolved       Chronic medical problems     Essential hypertension     CAD     Type II DM, uncontrolled with A1C of 9.8- continue insulin and adjust as needed as an outpatient      Bipolar disorder     Hyperlipidemia     Left BKA; uses prosthesis    Condition on Discharge:  stable       Physical Exam on Discharge:  /60 (BP Location: Right arm, Patient Position: Lying)   Pulse 79   Temp 97.1 °F (36.2 °C) (Temporal)   Resp 18   Ht 172.7 cm (68\")   Wt 117 kg (258 lb 4.8 oz)   SpO2 94%   BMI 39.27 kg/m²   Physical Exam  Vitals reviewed.   Constitutional:       General: She is not in acute distress.     Appearance: She is well-developed.   HENT:      Head: Normocephalic and atraumatic.      Nose: Nose normal.   Eyes:      Conjunctiva/sclera: Conjunctivae normal.   Cardiovascular:      Rate and Rhythm: Normal rate and regular rhythm.   Pulmonary:      Effort: Pulmonary effort is normal. No respiratory distress.      Breath sounds: Normal breath sounds. No wheezing or rales.   Musculoskeletal:         General: Normal range of motion.      Cervical back: Normal range of motion and neck supple.   Skin:     General: Skin is warm and dry.   Neurological:      Mental Status: She is alert and oriented to person, place, and time.   Psychiatric:         Behavior: Behavior normal.         Discharge Disposition:  Skilled Nursing " Facility (DC - External)    Discharge Medications:     Discharge Medications        New Medications        Instructions Start Date   Insulin Aspart 100 UNIT/ML injection  Commonly known as: novoLOG  Replaces: NovoLOG FlexPen 100 UNIT/ML solution pen-injector sc pen   0-7 Units, Subcutaneous, 3 Times Daily Before Meals      Insulin Aspart 100 UNIT/ML injection  Commonly known as: novoLOG   12 Units, Subcutaneous, 3 Times Daily With Meals      tamsulosin 0.4 MG capsule 24 hr capsule  Commonly known as: FLOMAX   0.4 mg, Oral, Daily   Start Date: June 9, 2023            Changes to Medications        Instructions Start Date   fluticasone 50 MCG/ACT nasal spray  Commonly known as: FLONASE  What changed: See the new instructions.   INSTILL 2 SPRAYS IN EACH NOSTRIL AS DIRECTED BY PROVIDER DAILY      gabapentin 100 MG capsule  Commonly known as: NEURONTIN  What changed: how much to take   200 mg, Oral, Every 12 Hours      HYDROcodone-acetaminophen 7.5-325 MG per tablet  Commonly known as: NORCO  What changed: additional instructions   1 tablet, Oral, Every 6 Hours PRN             Continue These Medications        Instructions Start Date   accu-chek soft touch lancets   As directed      amLODIPine 5 MG tablet  Commonly known as: NORVASC   5 mg, Oral, Daily      ARIPiprazole 5 MG tablet  Commonly known as: ABILIFY   5 mg, Oral, Daily      aspirin 325 MG EC tablet   325 mg, Oral, Daily      atorvastatin 80 MG tablet  Commonly known as: LIPITOR   80 mg, Oral, Daily      B-D ULTRAFINE III SHORT PEN 31G X 8 MM misc  Generic drug: Insulin Pen Needle   USE TO INJECT 5 TIMES A DAY      B-D ULTRAFINE III SHORT PEN 31G X 8 MM misc  Generic drug: Insulin Pen Needle   USE UP TO 4 (FOUR) TIMES DAILY WITH INSULIN AS DIRECTED.      BASAGLAR KWIKPEN 100 UNIT/ML injection pen   Inject 40 units into the appropriate area every morning and 35 units into the appropriate area every night      BD [employer] misc   1 each, Does not apply,  "Take As Directed      butalbital-acetaminophen-caffeine -40 MG per tablet  Commonly known as: FIORICET, ESGIC   Take one to two tablets by mouth per headache episode as needed.      Calcium Citrate-Vitamin D 250-200 MG-UNIT tablet   2 tablets, Oral, 2 Times Daily      clopidogrel 75 MG tablet  Commonly known as: PLAVIX   75 mg, Oral, Daily      cyanocobalamin 1000 MCG/ML injection   1,000 mcg, Intramuscular, Every 28 Days      dicyclomine 20 MG tablet  Commonly known as: BENTYL   1 tablet, Oral, Every 6 Hours      Easy Touch FlipLock Needles 25G X 1-1/2\" misc  Generic drug: Needle (Disp)   1 each, Does not apply, Every 28 Days, For administering B12 cyanocobalomin. Dx vitamin B12 deficiency.      famotidine 40 MG tablet  Commonly known as: PEPCID   1 tablet, Oral, Daily      folic acid 1 MG tablet  Commonly known as: FOLVITE   TAKE 1 TABLET BY MOUTH EVERY DAY      furosemide 40 MG tablet  Commonly known as: LASIX   TAKE 1 TABLET BY MOUTH EVERY DAY      glucose blood test strip  Commonly known as: Accu-Chek Guide   1 each, Other, 4 Times Daily, To test blood sugar 4X daily. For  Dx E11.65      glucose monitor monitoring kit   1 each, Does not apply, Daily, accucheck eve meter, E11.9      hydroCHLOROthiazide 12.5 MG tablet  Commonly known as: HYDRODIURIL   TAKE 1 TABLET BY MOUTH EVERY DAY      Hypodermic Needle 20G X 1\" misc   1 each, Does not apply, Every 28 Days, For drawing up B12 for injection monthly, change back to smaller gauge needle prior to injection. Dx Vitamin B12  Deficiency.      isosorbide mononitrate 30 MG 24 hr tablet  Commonly known as: IMDUR   TAKE 1 TABLET BY MOUTH EVERY DAY      levothyroxine 50 MCG tablet  Commonly known as: Synthroid   50 mcg, Oral, Daily      meclizine 25 MG tablet  Commonly known as: ANTIVERT   25 mg, Oral, 3 Times Daily PRN      meloxicam 15 MG tablet  Commonly known as: MOBIC   TAKE 1 TABLET BY MOUTH EVERY DAY WITH FOOD      methocarbamol 500 MG tablet  Commonly " "known as: ROBAXIN   TAKE 1 TABLET BY MOUTH 2 TIMES A DAY AS NEEDED FOR MUSCLE SPASMS.      metoclopramide 10 MG tablet  Commonly known as: REGLAN   10 mg, Oral, 4 Times Daily PRN      naloxone 0.4 MG/ML injection  Commonly known as: NARCAN   0.2 mg, Intravenous, Every 5 Minutes PRN      nitroglycerin 0.4 MG SL tablet  Commonly known as: NITROSTAT   0.4 mg, Sublingual, Every 5 Minutes PRN, Take no more than 3 doses in 15 minutes.      ondansetron 8 MG tablet  Commonly known as: ZOFRAN   8 mg, Oral, Every 8 Hours PRN      ondansetron ODT 4 MG disintegrating tablet  Commonly known as: ZOFRAN-ODT   4 mg, Translingual, 4 Times Daily PRN      ONE TOUCH ULTRA MINI w/Device kit   Patient will need the Accu-Check [employer] meter      pantoprazole 20 MG EC tablet  Commonly known as: PROTONIX   40 mg, Oral, Daily      ranolazine 500 MG 12 hr tablet  Commonly known as: RANEXA   500 mg, Oral, Every 12 Hours Scheduled      sucralfate 1 g tablet  Commonly known as: Carafate   1 g, Oral, 4 Times Daily      Syringe 20G X 1-1/2\" 3 ML misc   1 each, Does not apply, Every 28 Days, For injection cyanocobalomin, Dx vit B12 deficiency.      venlafaxine XR 75 MG 24 hr capsule  Commonly known as: EFFEXOR-XR   75 mg, Oral, Daily With Breakfast      vitamin D 1.25 MG (68123 UT) capsule capsule  Commonly known as: ERGOCALCIFEROL   TAKE 1 CAPSULE BY MOUTH EVERY 7 DAYS.             Stop These Medications      metoprolol succinate XL 50 MG 24 hr tablet  Commonly known as: TOPROL-XL     NovoLOG FlexPen 100 UNIT/ML solution pen-injector sc pen  Generic drug: insulin aspart  Replaced by: Insulin Aspart 100 UNIT/ML injection              I have utilized all available immediate resources to obtain, update, or review the patient's current medications (including all prescriptions, over-the-counter products, herbals, cannabis/cannabidiol products, and vitamin/mineral/dietary (nutritional) supplements).       Discharge Diet:   Diet Instructions       Advance " Diet As Tolerated -Target Diet: consistent carb      Target Diet: consistent carb            Activity at Discharge:   Activity Instructions       Activity as Tolerated              Follow-up Appointments:   Future Appointments   Date Time Provider Department [place]   6/9/2023  9:30 AM Dolly Foss APRN [facility] POW [facility]   6/20/2023  1:00 PM NURSE [facility] OPI [facility]   6/20/2023  1:30 PM Teressa Hammond APRN [facility]   8/8/2023  8:30 AM Elvis Gamboa APRN [facility] END [facility]   10/27/2023 10:00 AM Bill Gibson MD [facility] None       Test Results Pending at Discharge:     Time: >30 minutes          This document has been electronically signed by Mine Rodarte MD on June 8, 2023 12:38 CDT

## 2023-06-08 NOTE — THERAPY TREATMENT NOTE
Patient Name: Ariana Martinez  : 1960    MRN: [id number]                              Today's Date: 2023       Admit Date: 2023    Visit Dx:     ICD-10-CM ICD-9-CM   1. Sepsis without acute organ dysfunction, due to unspecified organism  A41.9 038.9     995.91   2. Cellulitis of right lower extremity  L03.115 682.6   3. Type 2 diabetes mellitus with hyperglycemia, unspecified whether long term insulin use  E11.65 250.00   4. Impaired mobility and activities of daily living  Z74.09 V49.89    Z78.9    5. Impaired physical mobility  Z74.09 781.99   6. Impaired mobility and ADLs  Z74.09 V49.89    Z78.9    7. Urinary retention  R33.9 788.20   8. Long term prescription opiate use  Z79.891 V58.69   9. Essential hypertension  I10 401.9     Patient Active Problem List   Diagnosis    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, with long-term current use of insulin    Closed nondisplaced fracture of fifth left metatarsal bone    MAURICIO (generalized anxiety disorder)    Depression, major, recurrent, moderate    GERD without esophagitis    Long term prescription opiate use    Mixed hyperlipidemia    Vitamin D deficiency    Seasonal allergic rhinitis    Restrictive lung disease secondary to obesity    Adult body mass index 37.0-37.9    Snoring    Class 3 severe obesity due to excess calories without serious comorbidity with body mass index (BMI) of 40.0 to 44.9 in adult    (HFpEF) heart failure with preserved ejection fraction    Type 2 diabetes mellitus with hyperglycemia, with long-term current use of insulin    Cyanocobalamin deficiency    Coronary artery disease of native artery of native heart with stable angina pectoris    Hypertension    Meniere's disease    Gastroparesis    Pulmonary hypertension    Pes cavus    Primary osteoarthritis involving multiple joints    Generalized anxiety disorder    Chronic right-sided low back pain with right-sided sciatica    Chest pain    Adverse  effect of iron    Chest pain due to myocardial ischemia    Nonrheumatic tricuspid valve regurgitation    Iron deficiency anemia due to chronic blood loss    Malaise and fatigue    Ankle arthritis    Urinary retention    Endocarditis    Essential hypertension    Occlusion and stenosis of bilateral carotid arteries    S/P BKA (below knee amputation) unilateral, left    Phantom pain after amputation of lower extremity    S/P CABG (coronary artery bypass graft)    Severe malnutrition    TIA (transient ischemic attack)    Coronary artery abnormality    Elevated d-dimer    Neuropathy    Chest pain, unspecified type    Acute pain of right shoulder    Rotator cuff syndrome, right    Acquired hypothyroidism    Bilateral leg edema    Left elbow pain    Gastritis    Olecranon bursitis, left elbow    Sepsis without acute organ dysfunction, due to unspecified organism     Past Medical History:   Diagnosis Date    Acute bacterial endocarditis 3/13/2021    Angina, class IV     Anxiety     Anxiety and depression     Arthritis     Benign paroxysmal positional vertigo     Bladder disorder     has bladder stimulator    Carpal tunnel syndrome     CHF (congestive heart failure)     Chronic pain     Coronary atherosclerosis     hx CABG 2005.  is followed by Dr Cher Amaya     Diabetes mellitus     Type 2, controlled    Diabetic polyneuropathy     Disease of thyroid gland     Elevated cholesterol     Female stress incontinence     Foot pain, left     Full dentures     Gastroparesis     GERD (gastroesophageal reflux disease)     Hyperlipidemia     Hypertension     Low back pain     Malaise and fatigue     Multiple joint pain     Obesity     Refuses to be weighed    Occlusion and stenosis of bilateral carotid arteries 6/18/2021    Otalgia     Both    Perforation of tympanic membrane     Left    Postoperative wound infection     Vitamin D deficiency     Wears glasses     reading     Past Surgical History:   Procedure Laterality  Date    ABDOMINAL SURGERY      AMPUTATION FOOT      ANGIOPLASTY      coronary    ANKLE ARTHROSCOPY Left 02/26/2021    Procedure: Left foot hardware removal, ankle arthroscopy, bone grafting , left foot exostectomy;  Surgeon: Ignacio Lord DPM;  Location: [facility] OR;  Service: Podiatry;  Laterality: Left;    BREAST BIOPSY Right     CARDIAC CATHETERIZATION      CARDIAC CATHETERIZATION N/A 06/20/2017    Procedure: Right Heart Cath;  Surgeon: Can Kwon MD PhD;  Location: [facility] CATH INVASIVE LOCATION;  Service:     CARDIAC CATHETERIZATION N/A 02/18/2020    Procedure: Left Heart Cath;  Surgeon: Catalina Cooper MD;  Location: [facility] CATH INVASIVE LOCATION;  Service: Cardiology;  Laterality: N/A;    CARDIAC CATHETERIZATION N/A 04/06/2022    Procedure: Left Heart Cath;  Surgeon: Sheryl Navas MD;  Location: [facility] CATH INVASIVE LOCATION;  Service: Cardiology;  Laterality: N/A;    CARPAL TUNNEL RELEASE      CHOLECYSTECTOMY      COLONOSCOPY N/A 06/24/2020    Procedure: COLONOSCOPY;  Surgeon: Julián Maldonado MD;  Location: [facility] ENDOSCOPY;  Service: Gastroenterology;  Laterality: N/A;    CORONARY ARTERY BYPASS GRAFT  2005    CYSTOSCOPY N/A 5/27/2023    Procedure: FLEXIBLE CYSTOSCOPY;  Surgeon: Rohan Doe MD;  Location: [facility] OR;  Service: Urology;  Laterality: N/A;    ENDOSCOPY N/A 10/19/2018    Procedure: ESOPHAGOGASTRODUODENOSCOPY possible dilation;  Surgeon: Julián Maldonado MD;  Location: [facility] ENDOSCOPY;  Service: Gastroenterology    ENDOSCOPY N/A 06/24/2020    Procedure: ESOPHAGOGASTRODUODENOSCOPY WED appt please;  Surgeon: Julián Maldonado MD;  Location: [facility] ENDOSCOPY;  Service: Gastroenterology;  Laterality: N/A;    ENDOSCOPY N/A 06/10/2022    Procedure: ESOPHAGOGASTRODUODENOSCOPY   room 380;  Surgeon: Jeremiah Wilkins MD;  Location: [facility] ENDOSCOPY;  Service: Gastroenterology;  Laterality: N/A;    ENDOSCOPY N/A 1/10/2023    Procedure: ESOPHAGOGASTRODUODENOSCOPY;  Surgeon:  Jeremiah Wilkins MD;  Location: [facility] ENDOSCOPY;  Service: Gastroenterology;  Laterality: N/A;    ENDOSCOPY AND COLONOSCOPY      FOOT SURGERY      Toes    FOOT SURGERY      GASTRIC BANDING      Revision, laparoscopic    HYSTERECTOMY      INCISION AND DRAINAGE LEG Left 03/12/2021    Procedure: Left ankle arthroscopic irrigation and debridement, screw removal;  Surgeon: Ignacio Lord DPM;  Location: [facility] OR;  Service: Podiatry;  Laterality: Left;    MOUTH SURGERY      SALPINGO OOPHORECTOMY      SHOULDER SURGERY      SUBTALAR ARTHRODESIS Left 01/16/2019    Procedure: LEFT FOOT HARDWARE REMOVAL, FIFTH METATARSAL , OPEN REDUCTION INTERNAL FIXATION, CALCANEAL OSTEOTOMY;  Surgeon: Ignacio Lord DPM;  Location: [facility] OR;  Service: Podiatry    SUBTALAR ARTHRODESIS Left 10/16/2019    Procedure: foot hardware removal, subtalar joint fusion  possible de/reattachment of achilles tendon        (c-arm);  Surgeon: Ignacio Lord DPM;  Location: [facility] OR;  Service: Podiatry    SUBTALAR ARTHRODESIS Left 09/30/2020    Procedure: subtalar, talonavicular joint arthrodesis.  Removal hardware.          (c-arm);  Surgeon: Ignacio Lord DPM;  Location: [facility] OR;  Service: Podiatry;  Laterality: Left;    TRANSESOPHAGEAL ECHOCARDIOGRAM (LAMONTE)      With color flow      General Information       Row Name 06/08/23 1250          OT Time and Intention    Document Type therapy note (daily note)  -CS     Mode of Treatment occupational therapy  -CS       Row Name 06/08/23 1250          General Information    Patient Profile Reviewed yes  -CS     Existing Precautions/Restrictions fall  -CS       Row Name 06/08/23 1250          Cognition    Orientation Status (Cognition) oriented x 4  -CS       Row Name 06/08/23 1250          Safety Issues, Functional Mobility    Impairments Affecting Function (Mobility) balance;endurance/activity tolerance;pain  -CS               User Key  (r) = Recorded By, (t) = Taken By, (c) = Cosigned  By      Initials Name Provider Type     Bessie Medina COTA Occupational Therapist Assistant                     Mobility/ADL's       Row Name 06/08/23 1250          Bed Mobility    Scooting/Bridging Esmeralda (Bed Mobility) modified independence  -       Row Name 06/08/23 1250          Transfers    Transfers sit-stand transfer;stand-sit transfer  -       Row Name 06/08/23 1250          Sit-Stand Transfer    Sit-Stand Esmeralda (Transfers) contact guard  -     Assistive Device (Sit-Stand Transfers) walker, front-wheeled  -       Row Name 06/08/23 1250          Stand-Sit Transfer    Stand-Sit Esmeralda (Transfers) contact guard  -     Assistive Device (Stand-Sit Transfers) walker, front-wheeled  -       Row Name 06/08/23 1250          Activities of Daily Living    BADL Assessment/Intervention upper body dressing;lower body dressing  -       Row Name 06/08/23 1250          Grooming Assessment/Training    Esmeralda Level (Grooming) grooming skills;set up  -     Position (Grooming) edge of bed sitting  -       Row Name 06/08/23 1250          Lower Body Dressing Assessment/Training    Esmeralda Level (Lower Body Dressing) don;pants/bottoms;shoes/slippers;socks;minimum assist (75% patient effort)  -     Position (Lower Body Dressing) edge of bed sitting;supported standing  -       Row Name 06/08/23 1250          Upper Body Dressing Assessment/Training    Esmeralda Level (Upper Body Dressing) upper body dressing skills;doff;don;set up;bra/undergarment;pull-over garment  -     Position (Upper Body Dressing) edge of bed sitting  -               User Key  (r) = Recorded By, (t) = Taken By, (c) = Cosigned By      Initials Name Provider Type     Bessie Medina COTA Occupational Therapist Assistant                   Obj/Interventions       Row Name 06/08/23 1250          Shoulder (Therapeutic Exercise)    Shoulder (Therapeutic Exercise) AROM (active range of motion)  -      Shoulder AROM (Therapeutic Exercise) bilateral;flexion;extension;external rotation;internal rotation  -CS     Shoulder Strengthening (Therapeutic Exercise) bilateral;flexion;extension;external rotation;internal rotation  -CS       Row Name 06/08/23 1250          Elbow/Forearm (Therapeutic Exercise)    Elbow/Forearm (Therapeutic Exercise) AROM (active range of motion)  -     Elbow/Forearm AROM (Therapeutic Exercise) bilateral;flexion;extension;supination;pronation  -CS     Elbow/Forearm Strengthening (Therapeutic Exercise) bilateral;flexion;extension;supination;pronation  -CS       Row Name 06/08/23 1250          Wrist (Therapeutic Exercise)    Wrist (Therapeutic Exercise) AROM (active range of motion)  -     Wrist AROM (Therapeutic Exercise) bilateral;flexion;extension  -CS     Wrist Strengthening (Therapeutic Exercise) bilateral;flexion;extension  -       Row Name 06/08/23 1250          Hand (Therapeutic Exercise)    Hand (Therapeutic Exercise) AROM (active range of motion)  -     Hand AROM/AAROM (Therapeutic Exercise) bilateral;finger flexion;finger extension  -     Hand Strengthening (Therapeutic Exercise) bilateral;finger flexion;finger extension  -       Row Name 06/08/23 1250          Motor Skills    Therapeutic Exercise shoulder;elbow/forearm;wrist;hand  -CS               User Key  (r) = Recorded By, (t) = Taken By, (c) = Cosigned By      Initials Name Provider Type    CS Bessie Medina COTA Occupational Therapist Assistant                   Goals/Plan       Row Name 06/08/23 1250          Transfer Goal 1 (OT)    Activity/Assistive Device (Transfer Goal 1, OT) toilet;wheelchair transfer  -     [place]amb Level/Cues Needed (Transfer Goal 1, OT) modified independence  -     Time Frame (Transfer Goal 1, OT) long term goal (LTG)  -     Progress/Outcome (Transfer Goal 1, OT) goal not met  -       Row Name 06/08/23 1250          Bathing Goal 1 (OT)    Activity/Device (Bathing Goal 1,  OT) bathing skills, all  -CS     [place] Level/Cues Needed (Bathing Goal 1, OT) modified independence  -CS     Time Frame (Bathing Goal 1, OT) long term goal (LTG)  -CS     Progress/Outcomes (Bathing Goal 1, OT) goal not met  -CS       Row Name 06/08/23 1250          Dressing Goal 1 (OT)    Activity/Device (Dressing Goal 1, OT) lower body dressing  -CS     [place]/Cues Needed (Dressing Goal 1, OT) modified independence  -CS     Time Frame (Dressing Goal 1, OT) long term goal (LTG)  -CS     Progress/Outcome (Dressing Goal 1, OT) goal not met  -CS       Row Name 06/08/23 1250          Toileting Goal 1 (OT)    Activity/Device (Toileting Goal 1, OT) toileting skills, all  -CS     [place] Level/Cues Needed (Toileting Goal 1, OT) modified independence  -CS     Time Frame (Toileting Goal 1, OT) long term goal (LTG)  -CS     Progress/Outcome (Toileting Goal 1, OT) goal not met  -CS       Row Name 06/08/23 1250          Self-Feeding Goal 1 (OT)    Activity/Device (Self-Feeding Goal 1, OT) self-feeding skills, all  -CS     [place] Level/Cues Needed (Self-Feeding Goal 1, OT) modified independence  -CS     Time Frame (Self-Feeding Goal 1, OT) long term goal (LTG)  -CS     Progress/Outcomes (Self-Feeding Goal 1, OT) goal met  -CS               User Key  (r) = Recorded By, (t) = Taken By, (c) = Cosigned By      Initials Name Provider Type    CS Bessie Medina COTA Occupational Therapist Assistant                   Clinical Impression       Row Name 06/08/23 1250          Pain Assessment    Pretreatment Pain Rating 0/10 - no pain  -CS     Posttreatment Pain Rating 0/10 - no pain  -CS       Row Name 06/08/23 1250          Plan of Care Review    Plan of Care Reviewed With patient  -CS     Progress improving  -CS       Row Name 06/08/23 1250          Therapy Assessment/Plan (OT)    Rehab Potential (OT) good, to achieve stated therapy goals  -CS     Criteria for Skilled Therapeutic Interventions Met (OT)  yes;skilled treatment is necessary  -CS       Row Name 06/08/23 1250          Therapy Plan Review/Discharge Plan (OT)    Anticipated Discharge Disposition (OT) inpatient rehabilitation facility;skilled nursing facility  -CS       Row Name 06/08/23 1250          Vital Signs    Pre Patient Position Supine  -CS     Post Patient Position Sitting  -CS       Row Name 06/08/23 1250          Positioning and Restraints    Pre-Treatment Position in bed  -CS     Post Treatment Position bed  -CS     In Bed sitting EOB;call light within reach;encouraged to call for assist;with family/caregiver  -CS               User Key  (r) = Recorded By, (t) = Taken By, (c) = Cosigned By      Initials Name Provider Type    CS Bessie Medina COTA Occupational Therapist Assistant                   Outcome Measures       Row Name 06/08/23 1250          How much help from another is currently needed...    Putting on and taking off regular lower body clothing? 3  -CS     Bathing (including washing, rinsing, and drying) 3  -CS     Toileting (which includes using toilet bed pan or urinal) 3  -CS     Putting on and taking off regular upper body clothing 4  -CS     Taking care of personal grooming (such as brushing teeth) 4  -CS     Eating meals 4  -CS     AM-PAC 6 Clicks Score (OT) 21  -CS       Row Name 06/08/23 1021          How much help from another person do you currently need...    Turning from your back to your side while in flat bed without using bedrails? 3  -LR     Moving from lying on back to sitting on the side of a flat bed without bedrails? 3  -LR     Moving to and from a bed to a chair (including a wheelchair)? 3  -LR     Standing up from a chair using your arms (e.g., wheelchair, bedside chair)? 3  -LR     Climbing 3-5 steps with a railing? 3  -LR     To walk in hospital room? 3  -LR     AM-PAC 6 Clicks Score (PT) 18  -LR     Highest level of mobility 6 --> Walked 10 steps or more  -LR               User Key  (r) = Recorded By,  (t) = Taken By, (c) = Cosigned By      Initials Name Provider Type    Niyah Moreno, RN Registered Nurse    Bessie Contreras COTA Occupational Therapist Assistant                    Occupational Therapy Education       Title: PT OT SLP Therapies (In Progress)       Topic: Occupational Therapy (Done)       Point: ADL training (Done)       Description:   Instruct learner(s) on proper safety adaptation and remediation techniques during self care or transfers.   Instruct in proper use of assistive devices.                  Learning Progress Summary             Patient Acceptance, E,TB, VU by LW at 6/4/2023 1458    Acceptance, E, VU by AB at 6/1/2023 0535    Acceptance, E, VU by AB at 5/31/2023 0519    Acceptance, E,TB, VU by  at 5/30/2023 1133    Comment: OT role, POC, t/f training    Acceptance, E, VU by AB at 5/30/2023 0516    Acceptance, E,TB, VU by LW at 5/26/2023 1516    Acceptance, E,TB, VU by LW at 5/25/2023 1459    Acceptance, E,TB, VU by RW at 5/22/2023 1243    Comment: POC, Role of OT    Acceptance, E,TB, VU by BB at 5/20/2023 1358    Acceptance, E,TB, VU by BB at 5/20/2023 1357                         Point: Home exercise program (Done)       Description:   Instruct learner(s) on appropriate technique for monitoring, assisting and/or progressing therapeutic exercises/activities.                  Learning Progress Summary             Patient Acceptance, E,TB, VU by LW at 6/4/2023 1458    Acceptance, E, VU by AB at 6/1/2023 0535    Acceptance, E, VU by AB at 5/31/2023 0519    Acceptance, E, VU by AB at 5/30/2023 0516    Acceptance, E,TB, VU by LW at 5/26/2023 1516    Acceptance, E,TB, VU by LW at 5/25/2023 1459    Acceptance, E,TB, VU by BB at 5/20/2023 1356                         Point: Precautions (Done)       Description:   Instruct learner(s) on prescribed precautions during self-care and functional transfers.                  Learning Progress Summary             Patient Acceptance, E,TB, VU  by LW at 6/4/2023 1458    Acceptance, E, VU by AB at 6/1/2023 0535    Acceptance, E, VU by AB at 5/31/2023 0519    Acceptance, E,TB, VU by MC at 5/30/2023 1133    Comment: OT role, POC, t/f training    Acceptance, E, VU by AB at 5/30/2023 0516    Acceptance, E,TB, VU by LW at 5/26/2023 1516    Acceptance, E,TB, VU by LW at 5/25/2023 1459    Acceptance, E,TB, VU by RW at 5/22/2023 1243    Comment: POC, Role of OT    Acceptance, E, DU by RB at 5/18/2023 1352    Comment: Pt edu on use of gait belt and non skid socks when OOB and no OOB without assist.                         Point: Body mechanics (Done)       Description:   Instruct learner(s) on proper positioning and spine alignment during self-care, functional mobility activities and/or exercises.                  Learning Progress Summary             Patient Acceptance, E,TB, VU by LW at 6/4/2023 1458    Acceptance, E, VU by AB at 6/1/2023 0535    Acceptance, E, VU by AB at 5/31/2023 0519    Acceptance, E,TB, VU by  at 5/30/2023 1133    Comment: OT role, POC, t/f training    Acceptance, E, VU by AB at 5/30/2023 0516    Acceptance, E,TB, VU by LW at 5/26/2023 1516    Acceptance, E,TB, VU by LW at 5/25/2023 1459    Acceptance, E,TB, VU by RW at 5/22/2023 1243    Comment: POC, Role of OT    Acceptance, E,TB, VU by BB at 5/20/2023 1358    Acceptance, E,TB, VU by BB at 5/20/2023 1357                                         User Key       Initials Effective Dates Name Provider Type Discipline     06/16/21 - 05/30/23 Fredi France, OT Occupational Therapist OT    BB 06/16/21 -  Matilde Rios COTA Occupational Therapist Assistant OT    LW 06/16/21 -  Sarah Irby COTA Occupational Therapist Assistant OT     10/19/22 -  Tao, Dana, OT Occupational Therapist OT    RW 09/22/22 -  Missy Esteves OT Occupational Therapist OT    AB 11/07/22 -  Joana Mc LPN Licensed Nurse Nurse                  OT Recommendation and Plan     Plan of Care  Review  Plan of Care Reviewed With: patient  Progress: improving  Outcome Evaluation: Pt tolerated tx fair this date. Pt c/o stomach pain but agreed to BUE ther ex. Pt gave good effort with UE ther ex. No goals met this tx. Continue OT POC.     Time Calculation:    Time Calculation- OT       Row Name 06/08/23 1452             Time Calculation- OT    OT Start Time 1250  -CS      OT Stop Time 1328  -CS      OT Time Calculation (min) 38 min  -CS      Total Timed Code Minutes- OT 38 minute(s)  -CS      OT Received On 06/08/23  -CS         Timed Charges    02286 - OT Therapeutic Exercise Minutes 15  -CS      76585 - OT Self Care/Mgmt Minutes 23  -CS         Total Minutes    Timed Charges Total Minutes 38  -CS       Total Minutes 38  -CS                User Key  (r) = Recorded By, (t) = Taken By, (c) = Cosigned By      Initials Name Provider Type    CS Bessie Medina COTA Occupational Therapist Assistant                  Therapy Charges for Today       Code Description Service Date Service Provider Modifiers Qty    59188073961 HC OT THER PROC EA 15 MIN 6/8/2023 Bessie Medina COTA GO 1    40803186260 HC OT SELF CARE/MGMT/TRAIN EA 15 MIN 6/8/2023 Bessie Medina COTA GO 2                 LINDA Davila  6/8/2023

## 2023-06-08 NOTE — PLAN OF CARE
Goal Outcome Evaluation:           Progress: improving  Outcome Evaluation: VSS. pain controled with PRN pain meds. Meds given per orders. Pt resting comfortably this shift. No acute complaints.

## 2023-06-09 RX ORDER — RANOLAZINE 500 MG/1
TABLET, EXTENDED RELEASE ORAL
Qty: 180 TABLET | Refills: 3 | Status: SHIPPED | OUTPATIENT
Start: 2023-06-09

## 2023-06-09 RX ORDER — HYDROCHLOROTHIAZIDE 12.5 MG/1
TABLET ORAL
Qty: 30 TABLET | Refills: 0 | Status: SHIPPED | OUTPATIENT
Start: 2023-06-09

## 2023-06-09 NOTE — PAYOR COMM NOTE
"[facility]  Case Managment Extender   Ethel Fuentes  (P) [phone]  (F) [phone]        Auth#[id number]   Ariana Bailey (63 y.o. Female)       Date of Birth   1960    Social Security Number       Address   [address] [facility] [address]    Home Phone   [phone]    MRN   [id number]       [facility]    Marital Status                               Admission Date   5/17/23    Admission Type   Emergency    Admitting Provider       Attending Provider       Department, Room/Bed   [facility] 4 [place], 411/1       Discharge Date   6/8/2023    Discharge Disposition   Skilled Nursing Facility (DC - External)    Discharge Destination                                 Attending Provider: (none)   Allergies: Seroquel [Quetiapine], Avandia [Rosiglitazone], Morphine And Related, Oxycodone    Isolation: None   Infection: None   Code Status: Prior    Ht: 172.7 cm (68\")   Wt: 117 kg (258 lb 4.8 oz)    Admission Cmt: None   Principal Problem: Sepsis without acute organ dysfunction, due to unspecified organism [A41.9]                   Active Insurance as of 5/17/2023       Primary Coverage       Payor Plan Insurance Group Employer/Plan Group    [employer] EMPLOYEE [id number]       Payor Plan Address Payor Plan Phone Number Payor Plan Fax Number Effective Dates    [address] [phone]  1/1/2022 - None Entered    [place] [address]         Subscriber Name Subscriber Birth Date Member ID       ARIANA BAILEY 1960 [id number]                     Emergency Contacts        (Rel.) Home Phone Work Phone Mobile Phone    Nadeem Bailey (Spouse) [phone] -- [phone]    Елена David (Sister) [phone] -- [phone]                 Discharge Summary        Mine Rodarte MD at 06/08/23 1238              [facility] Medicine " Services    DISCHARGE SUMMARY       Date of Admission: 5/17/2023  Date of Discharge:  6/8/2023  Primary Care Physician: Dolly Foss APRN    Presenting Problem/History of Present Illness:  Cellulitis of right lower extremity [L03.115]  Type 2 diabetes mellitus with hyperglycemia, unspecified whether long term insulin use [E11.65]  Sepsis without acute organ dysfunction, due to unspecified organism [A41.9]       Final Discharge Diagnoses:  Active Hospital Problems    Diagnosis     **Sepsis without acute organ dysfunction, due to unspecified organism     Urinary retention        Consults:   Consults       Date and Time Order Name Status Description    5/22/2023  2:14 PM Inpatient Urology Consult      5/17/2023 11:01 AM Hospitalist (on-call MD unless specified)              Procedures Performed: Procedure(s):  FLEXIBLE CYSTOSCOPY                Pertinent Test Results:   Imaging Results (All)       Procedure Component Value Units Date/Time    US Venous Doppler Upper Extremity Left (duplex) [[id number]] Collected: 06/07/23 0523     Updated: 06/07/23 0527    Narrative:      INDICATION:  L arm pain, midline in place, check for clot    COMPARISON:  None.    TECHNIQUE:  Ultrasound examination of the left upper extremity veins using high-resolution  gray-scale images with and without graded compression, where appropriate, and  color and spectral Doppler images.      Impression:      FINDINGS/IMPRESSION:  No evidence of deep vein thrombosis in the left upper extremity.      XR Abdomen KUB [[id number]] Collected: 05/24/23 1045     Updated: 05/24/23 1053    Narrative:      FINDINGS/    Impression:      Two (2) views of the abdomen and pelvis demonstrate significant stool throughout  the entire colon.    There is a presacral stimulator lead in place.    No other significant finding.  No active disease.    US Guided Vascular Access [[id number]] Collected: 05/22/23 0812     Updated: 05/22/23 0815    Narrative:      Ultrasound  guidance vascular    FINDINGS:  Real-time ultrasound imaging was provided for vascular access.  Please refer to  procedure note for real-time exam findings.  The left basilicVein was found to  be patent and compressible.  Access under direct sonographic visualization.  Permanent saved images sent to the PACS for documentation.      IR Insert Midline Without Port Pump 5 Plus [[id number]] Resulted: 05/22/23 0802     Updated: 05/22/23 0802    Narrative:      This procedure was auto-finalized with no dictation required.    US Venous Doppler Upper Extremity Right (duplex) [[id number]] Collected: 05/21/23 1155     Updated: 05/21/23 1159    Narrative:      US VENOUS DOPPLER UPPER EXTREMITY RIGHT (DUPLEX)    HISTORY: RUE swelling and pain    COMPARISON: NONE    FINDINGS:  Transverse and longitudinal grayscale and Doppler sonographic images of the  right upper extremity were obtained. Spectral analysis was also obtained.    There is normal flow and compressibility of the right subclavian, axillary,  brachial, and forearm veins.    Thrombus is noted in the cephalic vein.      Impression:        1. No evidence of DVT in the right upper extremity.    2. Superficial venous thrombosis in the cephalic vein.        US Venous Doppler Lower Extremity Right (duplex) [[id number]] Collected: 05/19/23 0847     Updated: 05/19/23 1642    Narrative:        TECHNIQUE:  High-resolution gray scale imaging, color flow Doppler, compression were  performed from the groin to the calf of the right lower extremity.  Augmentation  was performed of the right common femoral vein and popliteal vein.    FINDINGS:  No evidence of DVT.  There is a 3.7 cm nonspecific right inguinal lymph node.  No other significant findings.    SUMMARY:  Right inguinal adenopathy.  No evidence of DVT.    US Guided Vascular Access [[id number]] Collected: 05/17/23 1139     Updated: 05/17/23 1143    Narrative:      Ultrasound guidance vascular    FINDINGS:  Real-time ultrasound  imaging was provided for vascular access.  Please refer to  procedure note for real-time exam findings.  The right cephalic vein was found  to be patent and compressible.  Access under direct sonographic visualization.  Permanent saved images sent to the PACS for documentation.      IR Insert Midline Without Port Pump 5 Plus [[id number]] Resulted: 05/17/23 0944     Updated: 05/17/23 0944    Narrative:      This procedure was auto-finalized with no dictation required.    XR Chest 1 View [[id number]] Collected: 05/17/23 0830     Updated: 05/17/23 0932    Narrative:      HISTORY:  Dizziness and weakness.    FINDINGS:  No infiltrate, effusion, or pneumothorax is seen.  Heart size and pulmonary  vascularity are normal.  The lungs are clear.  The mediastinum, maria guadalupe, and  visualized osseous structures are unremarkable.      Impression:      No significant abnormality of the chest.      XR Tibia Fibula 2 View Right [[id number]] Collected: 05/17/23 0900     Updated: 05/17/23 0929    Narrative:      TECHNIQUE:  AP and lateral views of the right foreleg were obtained.    HISTORY:  Cellulitis.  Pain along the medial leg with redness and swelling.    COMPARISON:  None.    FINDINGS:  No displaced fracture is seen.  Joint spaces are well preserved.  There is no  appreciable soft tissue swelling.  No radiopaque foreign body or soft tissue  gas.  Mild vascular calcification.      Impression:      No acute radiographic abnormality.             Lab Results (last 48 hours)       Procedure Component Value Units Date/Time    POC Glucose Once [[id number]]  (Abnormal) Collected: 06/08/23 0726    Specimen: Blood Updated: 06/08/23 0847     Glucose 319 mg/dL      Comment: RN NotifiedOperator: [id number] [facility] HEATHERMeter ID: [id number]       Extra Tubes [[id number]] Collected: 06/08/23 0554    Specimen: Blood, Venous Line Updated: 06/08/23 0701    Narrative:      The following orders were created for panel order Extra Tubes.  Procedure                                Abnormality         Status                     ---------                               -----------         ------                     Green Top (Gel)[[id number]]                                  Final result                 Please view results for these tests on the individual orders.    Green Top (Gel) [[id number]] Collected: 06/08/23 0554    Specimen: Blood Updated: 06/08/23 0701     Extra Tube Hold for add-ons.     Comment: Auto resulted.       CBC Auto Differential [[id number]]  (Normal) Collected: 06/08/23 0554    Specimen: Blood Updated: 06/08/23 0651     WBC 7.78 10*3/mm3      RBC 4.38 10*6/mm3      Hemoglobin 12.5 g/dL      Hematocrit 38.4 %      MCV 87.7 fL      MCH 28.5 pg      MCHC 32.6 g/dL      RDW 13.9 %      RDW-SD 44.4 fl      MPV 9.7 fL      Platelets 406 10*3/mm3      Neutrophil % 67.4 %      Lymphocyte % 19.8 %      Monocyte % 8.1 %      Eosinophil % 3.7 %      Basophil % 0.6 %      Immature Grans % 0.4 %      Neutrophils, Absolute 5.24 10*3/mm3      Lymphocytes, Absolute 1.54 10*3/mm3      Monocytes, Absolute 0.63 10*3/mm3      Eosinophils, Absolute 0.29 10*3/mm3      Basophils, Absolute 0.05 10*3/mm3      Immature Grans, Absolute 0.03 10*3/mm3      nRBC 0.0 /100 WBC     POC Glucose Once [[id number]]  (Abnormal) Collected: 06/07/23 1945    Specimen: Blood Updated: 06/08/23 0549     Glucose 242 mg/dL      Comment: RN NotifiedOperator: [id number] DAYANA BOURGEOISNIFERMeter ID: [id number]       POC Glucose Once [[id number]]  (Abnormal) Collected: 06/07/23 1644    Specimen: Blood Updated: 06/07/23 1822     Glucose 182 mg/dL      Comment: RN NotifiedOperator: [id number] DAVID HEATHERMeter ID: [id number]       COVID PRE-OP / PRE-PROCEDURE SCREENING ORDER (NO ISOLATION) - Swab, Nasopharynx [[id number]]  (Normal) Collected: 06/07/23 1728    Specimen: Swab from Nasopharynx Updated: 06/07/23 2114    Narrative:      The following orders were created for panel order COVID PRE-OP /  PRE-PROCEDURE SCREENING ORDER (NO ISOLATION) - Swab, Nasopharynx.  Procedure                               Abnormality         Status                     ---------                               -----------         ------                     COVID-19,CEPHEID/JOVANI,CO...[[id number]]  Normal              Final result                 Please view results for these tests on the individual orders.    COVID-19,CEPHEID/JOVANI,COR/UMAIR/PAD/ZOILA/MAD IN-HOUSE(OR EMERGENT/ADD-ON),NP SWAB IN TRANSPORT MEDIA 3-4 HR TAT, RT-PCR - Swab, Nasopharynx [[id number]]  (Normal) Collected: 06/07/23 1728    Specimen: Swab from Nasopharynx Updated: 06/07/23 1759     COVID19 Not Detected    Narrative:      Fact sheet for providers: https://www.fda.gov/media/836112/download     Fact sheet for patients: https://www.fda.gov/media/484091/download  Fact sheet for providers: https://www.fda.gov/media/124455/download    Fact sheet for patients: https://www.fda.gov/media/707347/download    Test performed by PCR.    POC Glucose Once [[id number]]  (Abnormal) Collected: 06/07/23 1111    Specimen: Blood Updated: 06/07/23 1230     Glucose 326 mg/dL      Comment: RN NotifiedOperator: [id number] vivio HEATHERMeter ID: [id number]       POC Glucose Once [[id number]]  (Abnormal) Collected: 06/07/23 0723    Specimen: Blood Updated: 06/07/23 0751     Glucose 322 mg/dL      Comment: RN NotifiedOperator: [id number] vivio HEATHERMeter ID: [id number]       Extra Tubes [[id number]] Collected: 06/07/23 0550    Specimen: Blood, Venous Line Updated: 06/07/23 0700    Narrative:      The following orders were created for panel order Extra Tubes.  Procedure                               Abnormality         Status                     ---------                               -----------         ------                     Green Top (Gel)[[id number]]                                  Final result                 Please view results for these tests on the individual orders.    Green  Top (Gel) [[id number]] Collected: 06/07/23 0550    Specimen: Blood Updated: 06/07/23 0700     Extra Tube Hold for add-ons.     Comment: Auto resulted.       CBC Auto Differential [[id number]]  (Normal) Collected: 06/07/23 0550    Specimen: Blood Updated: 06/07/23 0631     WBC 8.28 10*3/mm3      RBC 4.16 10*6/mm3      Hemoglobin 12.0 g/dL      Hematocrit 35.5 %      MCV 85.3 fL      MCH 28.8 pg      MCHC 33.8 g/dL      RDW 13.8 %      RDW-SD 43.1 fl      MPV 9.3 fL      Platelets 415 10*3/mm3      Neutrophil % 66.0 %      Lymphocyte % 21.4 %      Monocyte % 8.2 %      Eosinophil % 3.5 %      Basophil % 0.5 %      Immature Grans % 0.4 %      Neutrophils, Absolute 5.47 10*3/mm3      Lymphocytes, Absolute 1.77 10*3/mm3      Monocytes, Absolute 0.68 10*3/mm3      Eosinophils, Absolute 0.29 10*3/mm3      Basophils, Absolute 0.04 10*3/mm3      Immature Grans, Absolute 0.03 10*3/mm3      nRBC 0.0 /100 WBC     POC Glucose Once [[id number]]  (Abnormal) Collected: 06/06/23 1913    Specimen: Blood Updated: 06/06/23 2033     Glucose 270 mg/dL      Comment: RN NotifiedOperator: [id number] [facility]: [id number]       POC Glucose Once [[id number]]  (Abnormal) Collected: 06/06/23 1621    Specimen: Blood Updated: 06/06/23 1635     Glucose 223 mg/dL      Comment: RN NotifiedOperator: [id number] ANAMARIA FLORESEEMeter ID: [id number]       POC Glucose Once [[id number]]  (Abnormal) Collected: 06/06/23 1349    Specimen: Blood Updated: 06/06/23 1435     Glucose 321 mg/dL      Comment: RN NotifiedOperator: [id number] ANAMARIA CARRILLONNEEMeter ID: [id number]       POC Glucose Once [[id number]]  (Abnormal) Collected: 06/06/23 1036    Specimen: Blood Updated: 06/06/23 1408     Glucose 485 mg/dL      Comment: RN NotifiedOperator: [id number] ANAMARIA LOPEZMeter ID: [id number]                 Chief Complaint on Day of Discharge: weakness    Hospital Course:    Please see admission history and physical for patient's initial presentation. During  "this hospitalization, the following problems were addressed...     Right lower leg cellulitis     With US venous doppler negative for DVT     Was on vanco and zosyn; now off, resolved      Sepsis  (POA)     With fever, leukocytosis and left shifting      Resolved      Physical deconditioning     PT and OT on the case     CM working on placement to ARU but she was denied; she would like to try SNFs and if that is not an option, she is willing to go back home with PT and OT     Urinary retention     During her hospitalization: resolved       Chronic medical problems     Essential hypertension     CAD     Type II DM, uncontrolled with A1C of 9.8- continue insulin and adjust as needed as an outpatient      Bipolar disorder     Hyperlipidemia     Left BKA; uses prosthesis    Condition on Discharge:  stable       Physical Exam on Discharge:  /60 (BP Location: Right arm, Patient Position: Lying)   Pulse 79   Temp 97.1 °F (36.2 °C) (Temporal)   Resp 18   Ht 172.7 cm (68\")   Wt 117 kg (258 lb 4.8 oz)   SpO2 94%   BMI 39.27 kg/m²   Physical Exam  Vitals reviewed.   Constitutional:       General: She is not in acute distress.     Appearance: She is well-developed.   HENT:      Head: Normocephalic and atraumatic.      Nose: Nose normal.   Eyes:      Conjunctiva/sclera: Conjunctivae normal.   Cardiovascular:      Rate and Rhythm: Normal rate and regular rhythm.   Pulmonary:      Effort: Pulmonary effort is normal. No respiratory distress.      Breath sounds: Normal breath sounds. No wheezing or rales.   Musculoskeletal:         General: Normal range of motion.      Cervical back: Normal range of motion and neck supple.   Skin:     General: Skin is warm and dry.   Neurological:      Mental Status: She is alert and oriented to person, place, and time.   Psychiatric:         Behavior: Behavior normal.         Discharge Disposition:  Skilled Nursing Facility (DC - External)    Discharge Medications:     Discharge " Medications        New Medications        Instructions Start Date   Insulin Aspart 100 UNIT/ML injection  Commonly known as: novoLOG  Replaces: NovoLOG FlexPen 100 UNIT/ML solution pen-injector sc pen   0-7 Units, Subcutaneous, 3 Times Daily Before Meals      Insulin Aspart 100 UNIT/ML injection  Commonly known as: novoLOG   12 Units, Subcutaneous, 3 Times Daily With Meals      tamsulosin 0.4 MG capsule 24 hr capsule  Commonly known as: FLOMAX   0.4 mg, Oral, Daily   Start Date: June 9, 2023            Changes to Medications        Instructions Start Date   fluticasone 50 MCG/ACT nasal spray  Commonly known as: FLONASE  What changed: See the new instructions.   INSTILL 2 SPRAYS IN EACH NOSTRIL AS DIRECTED BY PROVIDER DAILY      gabapentin 100 MG capsule  Commonly known as: NEURONTIN  What changed: how much to take   200 mg, Oral, Every 12 Hours      HYDROcodone-acetaminophen 7.5-325 MG per tablet  Commonly known as: NORCO  What changed: additional instructions   1 tablet, Oral, Every 6 Hours PRN             Continue These Medications        Instructions Start Date   accu-chek soft touch lancets   As directed      amLODIPine 5 MG tablet  Commonly known as: NORVASC   5 mg, Oral, Daily      ARIPiprazole 5 MG tablet  Commonly known as: ABILIFY   5 mg, Oral, Daily      aspirin 325 MG EC tablet   325 mg, Oral, Daily      atorvastatin 80 MG tablet  Commonly known as: LIPITOR   80 mg, Oral, Daily      B-D ULTRAFINE III SHORT PEN 31G X 8 MM misc  Generic drug: Insulin Pen Needle   USE TO INJECT 5 TIMES A DAY      B-D ULTRAFINE III SHORT PEN 31G X 8 MM misc  Generic drug: Insulin Pen Needle   USE UP TO 4 (FOUR) TIMES DAILY WITH INSULIN AS DIRECTED.      BASAGLAR KWIKPEN 100 UNIT/ML injection pen   Inject 40 units into the appropriate area every morning and 35 units into the appropriate area every night      BD [employer] misc   1 each, Does not apply, Take As Directed      butalbital-acetaminophen-caffeine  "-40 MG per tablet  Commonly known as: FIORICET, ESGIC   Take one to two tablets by mouth per headache episode as needed.      Calcium Citrate-Vitamin D 250-200 MG-UNIT tablet   2 tablets, Oral, 2 Times Daily      clopidogrel 75 MG tablet  Commonly known as: PLAVIX   75 mg, Oral, Daily      cyanocobalamin 1000 MCG/ML injection   1,000 mcg, Intramuscular, Every 28 Days      dicyclomine 20 MG tablet  Commonly known as: BENTYL   1 tablet, Oral, Every 6 Hours      Easy Touch FlipLock Needles 25G X 1-1/2\" misc  Generic drug: Needle (Disp)   1 each, Does not apply, Every 28 Days, For administering B12 cyanocobalomin. Dx vitamin B12 deficiency.      famotidine 40 MG tablet  Commonly known as: PEPCID   1 tablet, Oral, Daily      folic acid 1 MG tablet  Commonly known as: FOLVITE   TAKE 1 TABLET BY MOUTH EVERY DAY      furosemide 40 MG tablet  Commonly known as: LASIX   TAKE 1 TABLET BY MOUTH EVERY DAY      glucose blood test strip  Commonly known as: Accu-Chek Guide   1 each, Other, 4 Times Daily, To test blood sugar 4X daily. For  Dx E11.65      glucose monitor monitoring kit   1 each, Does not apply, Daily, accucheck eve meter, E11.9      hydroCHLOROthiazide 12.5 MG tablet  Commonly known as: HYDRODIURIL   TAKE 1 TABLET BY MOUTH EVERY DAY      Hypodermic Needle 20G X 1\" misc   1 each, Does not apply, Every 28 Days, For drawing up B12 for injection monthly, change back to smaller gauge needle prior to injection. Dx Vitamin B12  Deficiency.      isosorbide mononitrate 30 MG 24 hr tablet  Commonly known as: IMDUR   TAKE 1 TABLET BY MOUTH EVERY DAY      levothyroxine 50 MCG tablet  Commonly known as: Synthroid   50 mcg, Oral, Daily      meclizine 25 MG tablet  Commonly known as: ANTIVERT   25 mg, Oral, 3 Times Daily PRN      meloxicam 15 MG tablet  Commonly known as: MOBIC   TAKE 1 TABLET BY MOUTH EVERY DAY WITH FOOD      methocarbamol 500 MG tablet  Commonly known as: ROBAXIN   TAKE 1 TABLET BY MOUTH 2 TIMES A DAY AS " "NEEDED FOR MUSCLE SPASMS.      metoclopramide 10 MG tablet  Commonly known as: REGLAN   10 mg, Oral, 4 Times Daily PRN      naloxone 0.4 MG/ML injection  Commonly known as: NARCAN   0.2 mg, Intravenous, Every 5 Minutes PRN      nitroglycerin 0.4 MG SL tablet  Commonly known as: NITROSTAT   0.4 mg, Sublingual, Every 5 Minutes PRN, Take no more than 3 doses in 15 minutes.      ondansetron 8 MG tablet  Commonly known as: ZOFRAN   8 mg, Oral, Every 8 Hours PRN      ondansetron ODT 4 MG disintegrating tablet  Commonly known as: ZOFRAN-ODT   4 mg, Translingual, 4 Times Daily PRN      ONE TOUCH ULTRA MINI w/Device kit   Patient will need the Accu-Check Avitio meter      pantoprazole 20 MG EC tablet  Commonly known as: PROTONIX   40 mg, Oral, Daily      ranolazine 500 MG 12 hr tablet  Commonly known as: RANEXA   500 mg, Oral, Every 12 Hours Scheduled      sucralfate 1 g tablet  Commonly known as: Carafate   1 g, Oral, 4 Times Daily      Syringe 20G X 1-1/2\" 3 ML misc   1 each, Does not apply, Every 28 Days, For injection cyanocobalomin, Dx vit B12 deficiency.      venlafaxine XR 75 MG 24 hr capsule  Commonly known as: EFFEXOR-XR   75 mg, Oral, Daily With Breakfast      vitamin D 1.25 MG (99176 UT) capsule capsule  Commonly known as: ERGOCALCIFEROL   TAKE 1 CAPSULE BY MOUTH EVERY 7 DAYS.             Stop These Medications      metoprolol succinate XL 50 MG 24 hr tablet  Commonly known as: TOPROL-XL     NovoLOG FlexPen 100 UNIT/ML solution pen-injector sc pen  Generic drug: insulin aspart  Replaced by: Insulin Aspart 100 UNIT/ML injection              I have utilized all available immediate resources to obtain, update, or review the patient's current medications (including all prescriptions, over-the-counter products, herbals, cannabis/cannabidiol products, and vitamin/mineral/dietary (nutritional) supplements).       Discharge Diet:   Diet Instructions       Advance Diet As Tolerated -Target Diet: consistent carb      Target " Diet: consistent carb            Activity at Discharge:   Activity Instructions       Activity as Tolerated              Follow-up Appointments:   Future Appointments   Date Time Provider Department [place]   6/9/2023  9:30 AM Dolly Foss APRN [facility] POW [facility]   6/20/2023  1:00 PM NURSE [facility] OPI [facility]   6/20/2023  1:30 PM Teressa Hammond APRN [facility] ONC [facility]   8/8/2023  8:30 AM Elvis Gamboa APRN [facility] END [facility]   10/27/2023 10:00 AM Bill Gibson MD [facility] None       Test Results Pending at Discharge:     Time: >30 minutes          This document has been electronically signed by Mine Rodarte MD on June 8, 2023 12:38 CDT                    Electronically signed by Mine Rodarte MD at 06/08/23 1230

## 2023-07-17 ENCOUNTER — HOME HEALTH ADMISSION (OUTPATIENT)
Dept: HOME HEALTH SERVICES | Facility: HOME HEALTHCARE | Age: 63
End: 2023-07-17
Payer: COMMERCIAL

## 2023-07-20 PROBLEM — Z45.42 BATTERY END OF LIFE OF SPINAL CORD STIMULATOR: Status: ACTIVE | Noted: 2023-07-20

## 2023-07-25 ENCOUNTER — HOME CARE VISIT (OUTPATIENT)
Dept: HOME HEALTH SERVICES | Facility: CLINIC | Age: 63
End: 2023-07-25
Payer: COMMERCIAL

## 2023-07-25 VITALS
OXYGEN SATURATION: 97 % | RESPIRATION RATE: 20 BRPM | SYSTOLIC BLOOD PRESSURE: 142 MMHG | DIASTOLIC BLOOD PRESSURE: 82 MMHG | HEART RATE: 77 BPM | TEMPERATURE: 98 F

## 2023-07-25 PROCEDURE — G0300 HHS/HOSPICE OF LPN EA 15 MIN: HCPCS

## 2023-07-26 ENCOUNTER — OFFICE VISIT (OUTPATIENT)
Dept: GASTROENTEROLOGY | Facility: CLINIC | Age: 63
End: 2023-07-26
Payer: COMMERCIAL

## 2023-07-26 VITALS
BODY MASS INDEX: 38.77 KG/M2 | DIASTOLIC BLOOD PRESSURE: 73 MMHG | HEIGHT: 68 IN | HEART RATE: 76 BPM | SYSTOLIC BLOOD PRESSURE: 132 MMHG

## 2023-07-26 DIAGNOSIS — R11.0 NAUSEA: Primary | ICD-10-CM

## 2023-07-26 PROCEDURE — 99214 OFFICE O/P EST MOD 30 MIN: CPT | Performed by: INTERNAL MEDICINE

## 2023-07-26 NOTE — PROGRESS NOTES
Chief Complaint   Patient presents with    Abdominal Pain       Subjective    Ariana Martinez is a 63 y.o. female.    History of Present Illness  Patient presented to GI clinic for follow-up visit today.  Had a recent hospital admission for cellulitis of the right lower extremity.  Has occasional symptoms with epigastric abdominal pain and nausea.  Right upper quadrant pain is well controlled.  Denied vomiting, diarrhea, constipation, rectal bleeding or weight loss.  LFTs are normal.  Currently taking Levsin, PPI, Reglan and Carafate.       The following portions of the patient's history were reviewed and updated as appropriate:   Past Medical History:   Diagnosis Date    Acute bacterial endocarditis 03/13/2021    Angina, class IV     Anxiety     Anxiety and depression     Arthritis     Benign paroxysmal positional vertigo     Bladder disorder     has bladder stimulator    Carpal tunnel syndrome     CHF (congestive heart failure)     Chronic pain     Coronary atherosclerosis     hx CABG 2005.  is followed by Dr Cher Amaya     Diabetes mellitus     Type 2, controlled    Diabetic polyneuropathy     Disease of thyroid gland     Elevated cholesterol     Female stress incontinence     Foot pain, left     Full dentures     Gastroparesis     GERD (gastroesophageal reflux disease)     Hyperlipidemia     Hypertension     Low back pain     Malaise and fatigue     Meniere disease     Multiple joint pain     Obesity     Refuses to be weighed    Occlusion and stenosis of bilateral carotid arteries 06/18/2021    Otalgia     Both    Perforation of tympanic membrane     Left    Postoperative wound infection     Vitamin D deficiency     Wears glasses     reading     Past Surgical History:   Procedure Laterality Date    ABDOMINAL SURGERY      AMPUTATION FOOT      ANGIOPLASTY      coronary    ANKLE ARTHROSCOPY Left 02/26/2021    Procedure: Left foot hardware removal, ankle arthroscopy, bone grafting , left foot  exostectomy;  Surgeon: Ignacio Lord DPM;  Location: [facility] OR;  Service: Podiatry;  Laterality: Left;    BREAST BIOPSY Right     CARDIAC CATHETERIZATION      CARDIAC CATHETERIZATION N/A 06/20/2017    Procedure: Right Heart Cath;  Surgeon: Can Kwon MD PhD;  Location: [facility] CATH INVASIVE LOCATION;  Service:     CARDIAC CATHETERIZATION N/A 02/18/2020    Procedure: Left Heart Cath;  Surgeon: Catalina Cooper MD;  Location: [facility] CATH INVASIVE LOCATION;  Service: Cardiology;  Laterality: N/A;    CARDIAC CATHETERIZATION N/A 04/06/2022    Procedure: Left Heart Cath;  Surgeon: Sheryl Navas MD;  Location: [facility] CATH INVASIVE LOCATION;  Service: Cardiology;  Laterality: N/A;    CARPAL TUNNEL RELEASE      CHOLECYSTECTOMY      COLONOSCOPY N/A 06/24/2020    Procedure: COLONOSCOPY;  Surgeon: Julián Maldonado MD;  Location: [facility] ENDOSCOPY;  Service: Gastroenterology;  Laterality: N/A;    CORONARY ARTERY BYPASS GRAFT  2005    CYSTOSCOPY N/A 5/27/2023    Procedure: FLEXIBLE CYSTOSCOPY;  Surgeon: Rohan Doe MD;  Location: [facility] OR;  Service: Urology;  Laterality: N/A;    ENDOSCOPY N/A 10/19/2018    Procedure: ESOPHAGOGASTRODUODENOSCOPY possible dilation;  Surgeon: Julián Maldonado MD;  Location: [facility] ENDOSCOPY;  Service: Gastroenterology    ENDOSCOPY N/A 06/24/2020    Procedure: ESOPHAGOGASTRODUODENOSCOPY WED appt please;  Surgeon: Julián Maldonado MD;  Location: [facility] ENDOSCOPY;  Service: Gastroenterology;  Laterality: N/A;    ENDOSCOPY N/A 06/10/2022    Procedure: ESOPHAGOGASTRODUODENOSCOPY   room 380;  Surgeon: Jeremiah Wilkins MD;  Location: [facility] ENDOSCOPY;  Service: Gastroenterology;  Laterality: N/A;    ENDOSCOPY N/A 1/10/2023    Procedure: ESOPHAGOGASTRODUODENOSCOPY;  Surgeon: Jeremiah Wilkins MD;  Location: [facility] ENDOSCOPY;  Service: Gastroenterology;  Laterality: N/A;    ENDOSCOPY AND COLONOSCOPY      FOOT SURGERY      Toes    FOOT SURGERY      GASTRIC BANDING      Revision,  laparoscopic    HYSTERECTOMY      INCISION AND DRAINAGE LEG Left 2021    Procedure: Left ankle arthroscopic irrigation and debridement, screw removal;  Surgeon: Ignacio Lord DPM;  Location: [facility];  Service: Podiatry;  Laterality: Left;    MOUTH SURGERY      SALPINGO OOPHORECTOMY      SHOULDER SURGERY      SUBTALAR ARTHRODESIS Left 2019    Procedure: LEFT FOOT HARDWARE REMOVAL, FIFTH METATARSAL , OPEN REDUCTION INTERNAL FIXATION, CALCANEAL OSTEOTOMY;  Surgeon: Ignacio Lord DPM;  Location: [facility] OR;  Service: Podiatry    SUBTALAR ARTHRODESIS Left 10/16/2019    Procedure: foot hardware removal, subtalar joint fusion  possible de/reattachment of achilles tendon        (c-arm);  Surgeon: Ignacio Lord DPM;  Location: [facility] OR;  Service: Podiatry    SUBTALAR ARTHRODESIS Left 2020    Procedure: subtalar, talonavicular joint arthrodesis.  Removal hardware.          (c-arm);  Surgeon: Ignacio Lord DPM;  Location: [facility];  Service: Podiatry;  Laterality: Left;    TRANSESOPHAGEAL ECHOCARDIOGRAM (LAMONTE)      With color flow     Family History   Problem Relation Age of Onset    Diabetes Other     Heart disease Other     Hypertension Other     Heart disease Mother     Stroke Mother     Hypertension Mother     Diabetes Sister     Heart disease Sister     Hypertension Sister     Heart disease Sister     Diabetes Sister     Hypertension Sister     Diabetes Sister     Diabetes Sister     Diabetes Sister     Diabetes Sister      OB History          0    Para   0    Term   0       0    AB   0    Living   0         SAB   0    IAB   0    Ectopic   0    Molar        Multiple   0    Live Births                  Prior to Admission medications    Medication Sig Start Date End Date Taking? Authorizing Provider   amLODIPine (NORVASC) 5 MG tablet Take 1 tablet by mouth Daily. 23  Yes Mine Rodarte MD   ARIPiprazole (ABILIFY) 5 MG tablet TAKE 1 TABLET BY MOUTH EVERY  DAY 7/19/23  Yes Dolly Foss APRN   aspirin  MG tablet Take 1 tablet by mouth Daily. 2/8/22  Yes Maihn Esteves MD   atorvastatin (LIPITOR) 80 MG tablet Take 1 tablet by mouth Daily. 7/14/23  Yes Dolly Foss APRN B-EVY ULTRAFINE III SHORT PEN 31G X 8 MM misc USE TO INJECT 5 TIMES A DAY 11/11/22  Yes Elvis Gamboa APRN B-D ULTRAFINE III SHORT PEN 31G X 8 MM misc USE UP TO 4 (FOUR) TIMES DAILY WITH INSULIN AS DIRECTED. 12/27/22  Yes Dolly Foss APRN   BD SHARPS CONTAINER HOME misc 1 each Take As Directed. 9/7/18  Yes Francisca Fong MD   Blood Glucose Monitoring Suppl (ONE TOUCH ULTRA MINI) w/Device kit Patient will need the Accu-Check Avitio meter 10/18/21  Yes Kimberly Ferguson APRN   butalbital-acetaminophen-caffeine (FIORICET, ESGIC) -40 MG per tablet Take one to two tablets by mouth per headache episode as needed. 4/21/23  Yes Dolly Foss APRN   Calcium Citrate-Vitamin D 250-200 MG-UNIT tablet Take 2 tablets by mouth 2 (two) times a day.   Yes Esther Henao MD   clopidogrel (PLAVIX) 75 MG tablet TAKE 1 TABLET BY MOUTH EVERY DAY 6/23/23  Yes Dolly Foss APRN   cyanocobalamin 1000 MCG/ML injection Inject 1 mL into the appropriate muscle as directed by prescriber Every 28 (Twenty-Eight) Days. 6/27/23  Yes Dolly Foss APRN   dicyclomine (BENTYL) 20 MG tablet Take 1 tablet by mouth Every 6 (Six) Hours. 5/5/23  Yes Esther Henao MD   famotidine (PEPCID) 40 MG tablet Take 1 tablet by mouth Daily. 4/14/23  Yes Esther Henao MD   fluticasone (FLONASE) 50 MCG/ACT nasal spray INSTILL 2 SPRAYS IN EACH NOSTRIL AS DIRECTED BY PROVIDER DAILY  Patient taking differently: As Needed. 2/1/23  Yes Dolly Foss APRN   folic acid (FOLVITE) 1 MG tablet TAKE 1 TABLET BY MOUTH EVERY DAY 4/18/23  Yes Teressa Hammond APRN   furosemide (LASIX) 40 MG tablet TAKE 1 TABLET BY MOUTH EVERY DAY 2/10/23  Yes Dolly Foss APRN   gabapentin (NEURONTIN)  100 MG capsule Take 2 capsules by mouth Every 12 (Twelve) Hours. 6/8/23  Yes Mine Rodarte MD   glucose blood (Accu-Chek Guide) test strip 1 each by Other route 4 (Four) Times a Day. To test blood sugar 4X daily. For  Dx E11.65 2/28/23  Yes Kimberly Ferguson APRN   glucose monitor monitoring kit 1 each Daily. accucheck eve meter, E11.9 6/11/20  Yes Elvis Gamboa APRN   hydroCHLOROthiazide (HYDRODIURIL) 12.5 MG tablet Take 1 tablet by mouth Daily. 6/26/23  Yes Dolly Foss APRN   Insulin Aspart (novoLOG) 100 UNIT/ML injection Inject 0-7 Units under the skin into the appropriate area as directed 3 (Three) Times a Day Before Meals. 6/8/23  Yes Mine Rodarte MD   Insulin Aspart (novoLOG) 100 UNIT/ML injection Inject 12 Units under the skin into the appropriate area as directed 3 (Three) Times a Day With Meals. 6/8/23  Yes Mine Rodarte MD   Insulin Glargine (BASAGLAR KWIKPEN) 100 UNIT/ML injection pen Inject 40 units into the appropriate area every morning and 35 units into the appropriate area every night 10/24/22  Yes Elvis Gamboa APRN   isosorbide mononitrate (IMDUR) 30 MG 24 hr tablet TAKE 1 TABLET BY MOUTH EVERY DAY 7/7/23  Yes Dolly Foss APRN   Lancets (accu-chek soft touch) lancets As directed 10/18/21  Yes Kimberly Ferguson APRN   levothyroxine (Synthroid) 50 MCG tablet Take 1 tablet by mouth Daily. 4/26/23 4/25/24 Yes Elvis Gamboa APRN   levothyroxine (SYNTHROID, LEVOTHROID) 25 MCG tablet Take 1 tablet by mouth Daily. 6/23/23  Yes ProviderEsther MD   meclizine (ANTIVERT) 25 MG tablet Take 1 tablet by mouth 3 (Three) Times a Day As Needed for dizziness. 12/14/17  Yes Evon Hernandez APRN   meloxicam (MOBIC) 15 MG tablet TAKE 1 TABLET BY MOUTH EVERY DAY WITH FOOD 12/7/22  Yes Rohan Lazo MD   methocarbamol (ROBAXIN) 500 MG tablet TAKE 1 TABLET BY MOUTH 2 TIMES A DAY AS NEEDED FOR MUSCLE SPASMS. 6/7/22  Yes Kimberly Ferguson  "BEBE [place]   metoclopramide (REGLAN) 10 MG tablet TAKE 1 TABLET BY MOUTH 4 (FOUR) TIMES A DAY AS NEEDED (NAUSEA). 11/29/22  Yes Dolly Foss APRN   metoprolol succinate XL (TOPROL-XL) 50 MG 24 hr tablet TAKE 1 TABLET BY MOUTH EVERY DAY 6/21/23  Yes Dolly Foss APRN   naloxone (NARCAN) 0.4 MG/ML injection Infuse 0.5 mL into a venous catheter Every 5 (Five) Minutes As Needed for Opioid Reversal. 3/13/21  Yes Nick Faye MD   Needle, Disp, (Easy Touch FlipLock Needles) 25G X 1-1/2\" misc 1 each Every 28 (Twenty-Eight) Days. For administering B12 cyanocobalomin. Dx vitamin B12 deficiency. 5/24/22  Yes Kimberly Ferguson APRN   Needle, Disp, (Hypodermic Needle) 20G X 1\" misc 1 each Every 28 (Twenty-Eight) Days. For drawing up B12 for injection monthly, change back to smaller gauge needle prior to injection. Dx Vitamin B12  Deficiency. 5/24/22  Yes Kimberly Ferguson APRN   nitroglycerin (NITROSTAT) 0.4 MG SL tablet Place 1 tablet under the tongue Every 5 (Five) Minutes As Needed for Chest Pain. Take no more than 3 doses in 15 minutes. 4/26/22  Yes Kimberly Ferguson APRN   ondansetron (ZOFRAN) 8 MG tablet Take 1 tablet by mouth Every 8 (Eight) Hours As Needed for Nausea or Vomiting. 3/27/23  Yes Dolly Foss APRN   ondansetron ODT (ZOFRAN-ODT) 4 MG disintegrating tablet Place 1 tablet on the tongue 4 (Four) Times a Day As Needed for Nausea or Vomiting. 2/27/23  Yes Dolly Foss APRN   pantoprazole (PROTONIX) 20 MG EC tablet Take 2 tablets by mouth Daily. 2/20/23  Yes Dolly Foss APRN   ranolazine (RANEXA) 500 MG 12 hr tablet TAKE 1 TABLET BY MOUTH EVERY 12 HOURS 6/9/23  Yes Bill Gibson MD   sucralfate (CARAFATE) 1 g tablet TAKE 1 TABLET BY MOUTH FOUR TIMES DAILY 7/19/23  Yes Dolly Foss APRN   Syringe 20G X 1-1/2\" 3 ML misc 1 each Every 28 (Twenty-Eight) Days. For injection cyanocobalomin, Dx vit B12 deficiency. 5/24/22  Yes Kimberly Ferguson APRN   tamsulosin " (FLOMAX) 0.4 MG capsule 24 hr capsule TAKE 1 CAPSULE BY MOUTH EVERY DAY 7/19/23  Yes Dolly Foss APRN   venlafaxine XR (EFFEXOR-XR) 75 MG 24 hr capsule Take 1 capsule by mouth Daily With Breakfast. 3/10/23  Yes Dolly Foss APRN   vitamin D (ERGOCALCIFEROL) 1.25 MG (37718 UT) capsule capsule TAKE 1 CAPSULE BY MOUTH EVERY 7 DAYS. 12/29/22  Yes Dolly Foss APRN     Allergies   Allergen Reactions    Seroquel [Quetiapine] Anaphylaxis    Avandia [Rosiglitazone] Swelling    Morphine And Related Hallucinations    Oxycodone Hallucinations     Social History     Socioeconomic History    Marital status:    Tobacco Use    Smoking status: Never     Passive exposure: Never    Smokeless tobacco: Never   Vaping Use    Vaping Use: Never used   Substance and Sexual Activity    Alcohol use: No    Drug use: No    Sexual activity: Defer       Review of Systems  Review of Systems   Constitutional:  Negative for chills, fatigue, fever and unexpected weight change.   HENT:  Negative for congestion, ear discharge, hearing loss, nosebleeds and sore throat.    Eyes:  Negative for pain, discharge and redness.   Respiratory:  Negative for cough, chest tightness, shortness of breath and wheezing.    Cardiovascular:  Negative for chest pain and palpitations.   Gastrointestinal:  Positive for abdominal pain and nausea. Negative for abdominal distention, blood in stool, constipation, diarrhea and vomiting.   Endocrine: Negative for cold intolerance, polydipsia, polyphagia and polyuria.   Genitourinary:  Negative for dysuria, flank pain, frequency, hematuria and urgency.   Musculoskeletal:  Negative for arthralgias, back pain, joint swelling and myalgias.   Skin:  Negative for color change, pallor and rash.   Neurological:  Negative for tremors, seizures, syncope, weakness and headaches.   Hematological:  Negative for adenopathy. Does not bruise/bleed easily.   Psychiatric/Behavioral:  Negative for behavioral problems,  "confusion, dysphoric mood, hallucinations and suicidal ideas. The patient is not nervous/anxious.       /73   Pulse 76   Ht 172.7 cm (68\")   BMI 38.77 kg/m²     Objective    Physical Exam  Constitutional:       Appearance: She is well-developed.   HENT:      Head: Normocephalic and atraumatic.   Eyes:      Conjunctiva/sclera: Conjunctivae normal.      Pupils: Pupils are equal, round, and reactive to light.   Neck:      Thyroid: No thyromegaly.   Cardiovascular:      Rate and Rhythm: Normal rate and regular rhythm.      Heart sounds: Normal heart sounds. No murmur heard.  Pulmonary:      Effort: Pulmonary effort is normal.      Breath sounds: Normal breath sounds. No wheezing.   Abdominal:      General: Bowel sounds are normal. There is no distension.      Palpations: Abdomen is soft. There is no mass.      Tenderness: There is no abdominal tenderness.      Hernia: No hernia is present.   Genitourinary:     Comments: No lesions noted  Musculoskeletal:         General: No tenderness. Normal range of motion.      Cervical back: Normal range of motion and neck supple.   Lymphadenopathy:      Cervical: No cervical adenopathy.   Skin:     General: Skin is warm and dry.      Findings: No rash.   Neurological:      Mental Status: She is alert and oriented to person, place, and time.      Cranial Nerves: No cranial nerve deficit.   Psychiatric:         Thought Content: Thought content normal.     Admission on 07/16/2023, Discharged on 07/17/2023   Component Date Value Ref Range Status    Glucose 07/16/2023 545 (C)  65 - 99 mg/dL Final    BUN 07/16/2023 14  8 - 23 mg/dL Final    Creatinine 07/16/2023 0.88  0.57 - 1.00 mg/dL Final    Sodium 07/16/2023 132 (L)  136 - 145 mmol/L Final    Potassium 07/16/2023 3.9  3.5 - 5.2 mmol/L Final    Chloride 07/16/2023 95 (L)  98 - 107 mmol/L Final    CO2 07/16/2023 21.0 (L)  22.0 - 29.0 mmol/L Final    Calcium 07/16/2023 9.1  8.6 - 10.5 mg/dL Final    Total Protein 07/16/2023 7.5 "  6.0 - 8.5 g/dL Final    Albumin 07/16/2023 3.8  3.5 - 5.2 g/dL Final    ALT (SGPT) 07/16/2023 12  1 - 33 U/L Final    AST (SGOT) 07/16/2023 10  1 - 32 U/L Final    Alkaline Phosphatase 07/16/2023 112  39 - 117 U/L Final    Total Bilirubin 07/16/2023 0.3  0.0 - 1.2 mg/dL Final    Globulin 07/16/2023 3.7  gm/dL Final    A/G Ratio 07/16/2023 1.0  g/dL Final    BUN/Creatinine Ratio 07/16/2023 15.9  7.0 - 25.0 Final    Anion Gap 07/16/2023 16.0 (H)  5.0 - 15.0 mmol/L Final    eGFR 07/16/2023 74.9  >60.0 mL/min/1.73 Final    WBC 07/16/2023 12.80 (H)  3.40 - 10.80 10*3/mm3 Final    RBC 07/16/2023 4.37  3.77 - 5.28 10*6/mm3 Final    Hemoglobin 07/16/2023 12.2  12.0 - 15.9 g/dL Final    Hematocrit 07/16/2023 36.1  34.0 - 46.6 % Final    MCV 07/16/2023 82.6  79.0 - 97.0 fL Final    MCH 07/16/2023 27.9  26.6 - 33.0 pg Final    MCHC 07/16/2023 33.8  31.5 - 35.7 g/dL Final    RDW 07/16/2023 13.5  12.3 - 15.4 % Final    RDW-SD 07/16/2023 40.5  37.0 - 54.0 fl Final    MPV 07/16/2023 10.4  6.0 - 12.0 fL Final    Platelets 07/16/2023 443  140 - 450 10*3/mm3 Final    Neutrophil % 07/16/2023 73.1  42.7 - 76.0 % Final    Lymphocyte % 07/16/2023 18.0 (L)  19.6 - 45.3 % Final    Monocyte % 07/16/2023 5.5  5.0 - 12.0 % Final    Eosinophil % 07/16/2023 2.4  0.3 - 6.2 % Final    Basophil % 07/16/2023 0.5  0.0 - 1.5 % Final    Immature Grans % 07/16/2023 0.5  0.0 - 0.5 % Final    Neutrophils, Absolute 07/16/2023 9.36 (H)  1.70 - 7.00 10*3/mm3 Final    Lymphocytes, Absolute 07/16/2023 2.30  0.70 - 3.10 10*3/mm3 Final    Monocytes, Absolute 07/16/2023 0.70  0.10 - 0.90 10*3/mm3 Final    Eosinophils, Absolute 07/16/2023 0.31  0.00 - 0.40 10*3/mm3 Final    Basophils, Absolute 07/16/2023 0.07  0.00 - 0.20 10*3/mm3 Final    Immature Grans, Absolute 07/16/2023 0.06 (H)  0.00 - 0.05 10*3/mm3 Final    nRBC 07/16/2023 0.0  0.0 - 0.2 /100 WBC Final    RBC Morphology 07/16/2023 Normal  Normal Final    WBC Morphology 07/16/2023 Normal  Normal Final     Platelet Estimate 07/16/2023 Adequate  Normal Final    Large Platelets 07/16/2023 Slight/1+  None Seen Final    Glucose 07/16/2023 486 (C)  70 - 130 mg/dL Final    Notify DoctorOperator: [id number] ANDREW Mckay ID: [id number]     Assessment & Plan      1. Nausea    1.  Epigastric pain with nausea, likely due to gastroparesis.  Continue PPI and Reglan.  Repeat gastric emptying scan.  May need referral to [facility] motility center for possible gastric stimulator placement.  2.  Right upper quadrant pain, well controlled.  Continue Levsin.  Recent LFTs were normal.  3.  Chest pain, well controlled.  Continue PPI.  4.  Cellulitis, completed antibiotic therapy.  5.  Cough, resolved.  Likely due to GERD.  6.  Obesity, recommend exercise and diet control.       Orders placed during this encounter include:  Orders Placed This Encounter   Procedures    NM Gastric Emptying     Standing Status:   Future     Standing Expiration Date:   7/26/2024     Order Specific Question:   Release to patient     Answer:   Routine Release       * Surgery not found *    Review and/or summary of lab tests, radiology, procedures, medications. Review and summary of old records and obtaining of history. The risks and benefits of my recommendations, as well as other treatment options were discussed with the patient and family member(s) today. Questions were answered.    No orders of the defined types were placed in this encounter.      Follow-up: Return in about 1 month (around 8/26/2023).               Results for orders placed or performed during the hospital encounter of 07/16/23   Scan Slide    Specimen: Blood   Result Value Ref Range    RBC Morphology Normal Normal    WBC Morphology Normal Normal    Platelet Estimate Adequate Normal    Large Platelets Slight/1+ None Seen   CBC Auto Differential    Specimen: Blood   Result Value Ref Range    WBC 12.80 (H) 3.40 - 10.80 10*3/mm3    RBC 4.37 3.77 - 5.28 10*6/mm3    Hemoglobin  12.2 12.0 - 15.9 g/dL    Hematocrit 36.1 34.0 - 46.6 %    MCV 82.6 79.0 - 97.0 fL    MCH 27.9 26.6 - 33.0 pg    MCHC 33.8 31.5 - 35.7 g/dL    RDW 13.5 12.3 - 15.4 %    RDW-SD 40.5 37.0 - 54.0 fl    MPV 10.4 6.0 - 12.0 fL    Platelets 443 140 - 450 10*3/mm3    Neutrophil % 73.1 42.7 - 76.0 %    Lymphocyte % 18.0 (L) 19.6 - 45.3 %    Monocyte % 5.5 5.0 - 12.0 %    Eosinophil % 2.4 0.3 - 6.2 %    Basophil % 0.5 0.0 - 1.5 %    Immature Grans % 0.5 0.0 - 0.5 %    Neutrophils, Absolute 9.36 (H) 1.70 - 7.00 10*3/mm3    Lymphocytes, Absolute 2.30 0.70 - 3.10 10*3/mm3    Monocytes, Absolute 0.70 0.10 - 0.90 10*3/mm3    Eosinophils, Absolute 0.31 0.00 - 0.40 10*3/mm3    Basophils, Absolute 0.07 0.00 - 0.20 10*3/mm3    Immature Grans, Absolute 0.06 (H) 0.00 - 0.05 10*3/mm3    nRBC 0.0 0.0 - 0.2 /100 WBC   POC Glucose Once    Specimen: Blood   Result Value Ref Range    Glucose 486 (C) 70 - 130 mg/dL   Comprehensive Metabolic Panel    Specimen: Blood   Result Value Ref Range    Glucose 545 (C) 65 - 99 mg/dL    BUN 14 8 - 23 mg/dL    Creatinine 0.88 0.57 - 1.00 mg/dL    Sodium 132 (L) 136 - 145 mmol/L    Potassium 3.9 3.5 - 5.2 mmol/L    Chloride 95 (L) 98 - 107 mmol/L    CO2 21.0 (L) 22.0 - 29.0 mmol/L    Calcium 9.1 8.6 - 10.5 mg/dL    Total Protein 7.5 6.0 - 8.5 g/dL    Albumin 3.8 3.5 - 5.2 g/dL    ALT (SGPT) 12 1 - 33 U/L    AST (SGOT) 10 1 - 32 U/L    Alkaline Phosphatase 112 39 - 117 U/L    Total Bilirubin 0.3 0.0 - 1.2 mg/dL    Globulin 3.7 gm/dL    A/G Ratio 1.0 g/dL    BUN/Creatinine Ratio 15.9 7.0 - 25.0    Anion Gap 16.0 (H) 5.0 - 15.0 mmol/L    eGFR 74.9 >60.0 mL/min/1.73   Results for orders placed or performed in visit on 07/12/23   [employer] Select 13 Discrete -    Specimen: Urine   Result Value Ref Range    Report Summary FINAL     Ethanol Screen Urine (Ref) +POSITIVE+     Ethanol, Urine >0.400 g/dL    Amphetamine, Urine Qual Negative     Methamphetamine, Urine Not Detected ng/mg creat    Amphetamine, Urine Not  Detected ng/mg creat    MDMA URINE Not Detected ng/mg creat    MDA Not Detected ng/mg creat    Benzodiazepine Screen, Urine Negative     DIAZEPAM URINE QUANT (REF) Not Detected ng/mg creat    Desmethyldiazepam Not Detected ng/mg creat    Oxazepam, urine Not Detected ng/mg creat    Temazepam, urine Not Detected ng/mg creat    ALPRAZOLAM Not Detected ng/mg creat    ALPHA-HYDROXYALPRAZOLAM UR, QT (REF) Not Detected ng/mg creat    DESALKLFLURAZEPAM UR QUANT (REF) Not Detected ng/mg creat    LORAZEPAM URINE QUANT (REF) Not Detected ng/mg creat    Alpha-hydroxytriazolam, Urine Not Detected ng/mg creat    Clonazepam Urine Not Detected ng/mg creat    7-Aminoclonazepam Urine Not Detected ng/mg creat    MIDAZOLAM UR, QUANT (REF) Not Detected ng/mg creat    Alpha-hydroxymidazolam, Urine Not Detected ng/mg creat    Flunitrazepam Not Detected ng/mg creat    DESMETHYLFLUNITRAZEPAM Not Detected ng/mg creat    Cocaine & Metabolite Negative     Cocaine Screen, Urine Not Detected ng/mg creat    Benzoylecgonine, Urine Not Detected ng/mg creat    Cocaethylene Ur Not Detected ng/mg creat    THC, Urine Negative     Carboxy THC, Urine Not Detected ng/mg creat    6-ACETYLMORPHINE Negative     6-acetylmorphine Not Detected ng/mg creat    Opiate Class +POSITIVE+     Codeine, Urine Not Detected ng/mg creat    Morphine, Urine Not Detected ng/mg creat    normorphine Not Detected ng/mg creat    Norcodeine Ur Not Detected ng/mg creat    Hydrocodone  ng/mg creat    Hydromorphone Urine 206 ng/mg creat    Dihydrocodeine, Urine 342 ng/mg creat    Opiates, Norhydrocodone, Urine 244 ng/mg creat    Oxycodone Class Ur Negative     Oxycodone, Confirmation, Urine Not Detected ng/mg creat    Oxymorphone UR Not Detected ng/mg creat    Opiates, Noroxycodone, Urine Not Detected ng/mg creat    Noroxymorphone Not Detected ng/mg creat    Methadone Negative     Methadone, Quantitative Not Detected ng/mg creat    EDDP Not Detected ng/mg creat    Fentanyl and  Analogues, Ur Negative     Fentanyl, Urine Not Detected ng/mg creat    Norfentanyl Urine Not Detected ng/mg creat    Sufentanil, Ur Not Detected ng/mg creat    Alfentanil, Ur Not Detected ng/mg creat    BUPRENORPHINE Negative     Buprenorphine, Urine Not Detected ng/mg creat    Norbuprenorphine Not Detected ng/mg creat    Tapentadol Negative     Tapentadol Ur Not Detected ng/mg creat    Opoidss other, UR Negative     Tramadol, Urine Not Detected ng/mg creat    O-desmethyltramadol, Ur Not Detected ng/mg creat    N-Desmethyltramadol, U Not Detected ng/mg creat    BARBITURATES, URINE Negative     Amobarbital, Urine Not Detected     Barbital Not Detected     Butabarbital, Ur Not Detected     Butalbital Screen, Urine Not Detected     Mephobarbital Urine Not Detected     Pentobarbital UR Not Detected     Phenobarbital Not Detected     Secobarbital, urine Not Detected     Thiopental, Ur Not Detected     Creatinine, Urine 52 mg/dL    Level of Detection: Comment      *Note: Due to a large number of results and/or encounters for the requested time period, some results have not been displayed. A complete set of results can be found in Results Review.         This document has been electronically signed by Jeremiah Wilkins MD on July 26, 2023 15:42 CDT

## 2023-07-28 ENCOUNTER — HOME CARE VISIT (OUTPATIENT)
Dept: HOME HEALTH SERVICES | Facility: CLINIC | Age: 63
End: 2023-07-28
Payer: COMMERCIAL

## 2023-07-28 VITALS
OXYGEN SATURATION: 98 % | DIASTOLIC BLOOD PRESSURE: 74 MMHG | RESPIRATION RATE: 20 BRPM | HEART RATE: 71 BPM | TEMPERATURE: 97.7 F | SYSTOLIC BLOOD PRESSURE: 122 MMHG

## 2023-07-28 PROCEDURE — G0300 HHS/HOSPICE OF LPN EA 15 MIN: HCPCS

## 2023-07-31 ENCOUNTER — OFFICE VISIT (OUTPATIENT)
Dept: ORTHOPEDIC SURGERY | Facility: CLINIC | Age: 63
End: 2023-07-31
Payer: COMMERCIAL

## 2023-07-31 VITALS — HEIGHT: 68 IN | BODY MASS INDEX: 38.77 KG/M2

## 2023-07-31 DIAGNOSIS — M70.22 OLECRANON BURSITIS OF LEFT ELBOW: ICD-10-CM

## 2023-07-31 DIAGNOSIS — M25.522 LEFT ELBOW PAIN: Primary | ICD-10-CM

## 2023-07-31 PROCEDURE — 99213 OFFICE O/P EST LOW 20 MIN: CPT | Performed by: NURSE PRACTITIONER

## 2023-08-01 ENCOUNTER — HOME CARE VISIT (OUTPATIENT)
Dept: HOME HEALTH SERVICES | Facility: CLINIC | Age: 63
End: 2023-08-01
Payer: COMMERCIAL

## 2023-08-01 ENCOUNTER — TELEPHONE (OUTPATIENT)
Dept: FAMILY MEDICINE CLINIC | Facility: CLINIC | Age: 63
End: 2023-08-01
Payer: COMMERCIAL

## 2023-08-01 VITALS
SYSTOLIC BLOOD PRESSURE: 124 MMHG | OXYGEN SATURATION: 97 % | DIASTOLIC BLOOD PRESSURE: 64 MMHG | TEMPERATURE: 97.2 F | HEART RATE: 66 BPM | RESPIRATION RATE: 20 BRPM

## 2023-08-01 PROCEDURE — G0300 HHS/HOSPICE OF LPN EA 15 MIN: HCPCS

## 2023-08-04 ENCOUNTER — HOME CARE VISIT (OUTPATIENT)
Dept: HOME HEALTH SERVICES | Facility: CLINIC | Age: 63
End: 2023-08-04
Payer: COMMERCIAL

## 2023-08-04 ENCOUNTER — LAB (OUTPATIENT)
Dept: LAB | Facility: HOSPITAL | Age: 63
End: 2023-08-04
Payer: COMMERCIAL

## 2023-08-04 DIAGNOSIS — E11.65 TYPE 2 DIABETES MELLITUS WITH HYPERGLYCEMIA, WITH LONG-TERM CURRENT USE OF INSULIN: ICD-10-CM

## 2023-08-04 DIAGNOSIS — E03.9 ACQUIRED HYPOTHYROIDISM: ICD-10-CM

## 2023-08-04 DIAGNOSIS — E55.9 VITAMIN D DEFICIENCY: ICD-10-CM

## 2023-08-04 DIAGNOSIS — E78.2 MIXED HYPERLIPIDEMIA: ICD-10-CM

## 2023-08-04 DIAGNOSIS — I10 PRIMARY HYPERTENSION: ICD-10-CM

## 2023-08-04 DIAGNOSIS — Z79.4 TYPE 2 DIABETES MELLITUS WITH HYPERGLYCEMIA, WITH LONG-TERM CURRENT USE OF INSULIN: ICD-10-CM

## 2023-08-04 LAB
25(OH)D3 SERPL-MCNC: 28.4 NG/ML (ref 30–100)
ALBUMIN SERPL-MCNC: 3.9 G/DL (ref 3.5–5.2)
ALBUMIN/GLOB SERPL: 1 G/DL
ALP SERPL-CCNC: 112 U/L (ref 39–117)
ALT SERPL W P-5'-P-CCNC: 17 U/L (ref 1–33)
ANION GAP SERPL CALCULATED.3IONS-SCNC: 11 MMOL/L (ref 5–15)
AST SERPL-CCNC: 22 U/L (ref 1–32)
BASOPHILS # BLD AUTO: 0.06 10*3/MM3 (ref 0–0.2)
BASOPHILS NFR BLD AUTO: 0.5 % (ref 0–1.5)
BILIRUB SERPL-MCNC: 0.3 MG/DL (ref 0–1.2)
BUN SERPL-MCNC: 23 MG/DL (ref 8–23)
BUN/CREAT SERPL: 17.8 (ref 7–25)
CALCIUM SPEC-SCNC: 9.4 MG/DL (ref 8.6–10.5)
CHLORIDE SERPL-SCNC: 100 MMOL/L (ref 98–107)
CHOLEST SERPL-MCNC: 167 MG/DL (ref 0–200)
CO2 SERPL-SCNC: 27 MMOL/L (ref 22–29)
CREAT SERPL-MCNC: 1.29 MG/DL (ref 0.57–1)
DEPRECATED RDW RBC AUTO: 43.9 FL (ref 37–54)
EGFRCR SERPLBLD CKD-EPI 2021: 47.3 ML/MIN/1.73
EOSINOPHIL # BLD AUTO: 0.34 10*3/MM3 (ref 0–0.4)
EOSINOPHIL NFR BLD AUTO: 3 % (ref 0.3–6.2)
ERYTHROCYTE [DISTWIDTH] IN BLOOD BY AUTOMATED COUNT: 14.6 % (ref 12.3–15.4)
GLOBULIN UR ELPH-MCNC: 4 GM/DL
GLUCOSE SERPL-MCNC: 85 MG/DL (ref 65–99)
HBA1C MFR BLD: 9.2 % (ref 4.8–5.6)
HCT VFR BLD AUTO: 40 % (ref 34–46.6)
HDLC SERPL-MCNC: 39 MG/DL (ref 40–60)
HGB BLD-MCNC: 13.2 G/DL (ref 12–15.9)
IMM GRANULOCYTES # BLD AUTO: 0.07 10*3/MM3 (ref 0–0.05)
IMM GRANULOCYTES NFR BLD AUTO: 0.6 % (ref 0–0.5)
LDLC SERPL CALC-MCNC: 92 MG/DL (ref 0–100)
LDLC/HDLC SERPL: 2.2 {RATIO}
LYMPHOCYTES # BLD AUTO: 2.99 10*3/MM3 (ref 0.7–3.1)
LYMPHOCYTES NFR BLD AUTO: 26.3 % (ref 19.6–45.3)
MCH RBC QN AUTO: 27.4 PG (ref 26.6–33)
MCHC RBC AUTO-ENTMCNC: 33 G/DL (ref 31.5–35.7)
MCV RBC AUTO: 83 FL (ref 79–97)
MONOCYTES # BLD AUTO: 0.99 10*3/MM3 (ref 0.1–0.9)
MONOCYTES NFR BLD AUTO: 8.7 % (ref 5–12)
NEUTROPHILS NFR BLD AUTO: 6.93 10*3/MM3 (ref 1.7–7)
NEUTROPHILS NFR BLD AUTO: 60.9 % (ref 42.7–76)
NRBC BLD AUTO-RTO: 0 /100 WBC (ref 0–0.2)
PLATELET # BLD AUTO: 422 10*3/MM3 (ref 140–450)
PMV BLD AUTO: 9.8 FL (ref 6–12)
POTASSIUM SERPL-SCNC: 3.6 MMOL/L (ref 3.5–5.2)
PROT SERPL-MCNC: 7.9 G/DL (ref 6–8.5)
RBC # BLD AUTO: 4.82 10*6/MM3 (ref 3.77–5.28)
SODIUM SERPL-SCNC: 138 MMOL/L (ref 136–145)
TRIGL SERPL-MCNC: 211 MG/DL (ref 0–150)
TSH SERPL DL<=0.05 MIU/L-ACNC: 6.73 UIU/ML (ref 0.27–4.2)
VIT B12 BLD-MCNC: 468 PG/ML (ref 211–946)
VLDLC SERPL-MCNC: 36 MG/DL (ref 5–40)
WBC NRBC COR # BLD: 11.38 10*3/MM3 (ref 3.4–10.8)

## 2023-08-04 PROCEDURE — 80050 GENERAL HEALTH PANEL: CPT

## 2023-08-04 PROCEDURE — 83036 HEMOGLOBIN GLYCOSYLATED A1C: CPT

## 2023-08-04 PROCEDURE — 82043 UR ALBUMIN QUANTITATIVE: CPT

## 2023-08-04 PROCEDURE — 36415 COLL VENOUS BLD VENIPUNCTURE: CPT

## 2023-08-04 PROCEDURE — 80061 LIPID PANEL: CPT

## 2023-08-04 PROCEDURE — 82607 VITAMIN B-12: CPT

## 2023-08-04 PROCEDURE — 82570 ASSAY OF URINE CREATININE: CPT

## 2023-08-04 PROCEDURE — G0299 HHS/HOSPICE OF RN EA 15 MIN: HCPCS

## 2023-08-04 PROCEDURE — 82306 VITAMIN D 25 HYDROXY: CPT

## 2023-08-05 DIAGNOSIS — I10 ESSENTIAL HYPERTENSION: Chronic | ICD-10-CM

## 2023-08-05 LAB
ALBUMIN UR-MCNC: <1.2 MG/DL
CREAT UR-MCNC: 71.3 MG/DL
MICROALBUMIN/CREAT UR: NORMAL MG/G{CREAT}

## 2023-08-06 VITALS
RESPIRATION RATE: 18 BRPM | DIASTOLIC BLOOD PRESSURE: 74 MMHG | OXYGEN SATURATION: 97 % | SYSTOLIC BLOOD PRESSURE: 136 MMHG | HEART RATE: 68 BPM | TEMPERATURE: 97.7 F

## 2023-08-07 ENCOUNTER — HOME CARE VISIT (OUTPATIENT)
Dept: HOME HEALTH SERVICES | Facility: CLINIC | Age: 63
End: 2023-08-07
Payer: COMMERCIAL

## 2023-08-07 VITALS
RESPIRATION RATE: 18 BRPM | SYSTOLIC BLOOD PRESSURE: 128 MMHG | OXYGEN SATURATION: 98 % | TEMPERATURE: 98.4 F | DIASTOLIC BLOOD PRESSURE: 72 MMHG | HEART RATE: 63 BPM

## 2023-08-07 PROCEDURE — G0299 HHS/HOSPICE OF RN EA 15 MIN: HCPCS

## 2023-08-07 RX ORDER — AMLODIPINE BESYLATE 5 MG/1
TABLET ORAL
Qty: 30 TABLET | Refills: 0 | OUTPATIENT
Start: 2023-08-07

## 2023-08-07 RX ORDER — AMLODIPINE BESYLATE 5 MG/1
5 TABLET ORAL DAILY
Qty: 90 TABLET | Refills: 1 | Status: SHIPPED | OUTPATIENT
Start: 2023-08-07

## 2023-08-08 ENCOUNTER — OFFICE VISIT (OUTPATIENT)
Dept: ENDOCRINOLOGY | Facility: CLINIC | Age: 63
End: 2023-08-08
Payer: COMMERCIAL

## 2023-08-08 VITALS
BODY MASS INDEX: 38.65 KG/M2 | SYSTOLIC BLOOD PRESSURE: 140 MMHG | DIASTOLIC BLOOD PRESSURE: 70 MMHG | HEIGHT: 68 IN | WEIGHT: 255 LBS | OXYGEN SATURATION: 99 % | HEART RATE: 61 BPM

## 2023-08-08 DIAGNOSIS — E55.9 VITAMIN D DEFICIENCY: Chronic | ICD-10-CM

## 2023-08-08 DIAGNOSIS — Z79.4 TYPE 2 DIABETES MELLITUS WITH HYPERGLYCEMIA, WITH LONG-TERM CURRENT USE OF INSULIN: Primary | Chronic | ICD-10-CM

## 2023-08-08 DIAGNOSIS — N18.31 STAGE 3A CHRONIC KIDNEY DISEASE: ICD-10-CM

## 2023-08-08 DIAGNOSIS — E11.65 TYPE 2 DIABETES MELLITUS WITH HYPERGLYCEMIA, WITH LONG-TERM CURRENT USE OF INSULIN: Primary | Chronic | ICD-10-CM

## 2023-08-08 DIAGNOSIS — I10 PRIMARY HYPERTENSION: Chronic | ICD-10-CM

## 2023-08-08 DIAGNOSIS — E78.2 MIXED HYPERLIPIDEMIA: Chronic | ICD-10-CM

## 2023-08-08 DIAGNOSIS — E03.9 ACQUIRED HYPOTHYROIDISM: ICD-10-CM

## 2023-08-08 NOTE — PROGRESS NOTES
"Chief Complaint  Diabetes   Subjective          Ariana Martinez presents to [facility] ENDOCRINOLOGY  Diabetes       In office visit      Primary provider BEBE Rob     63-year-old female presents for follow-up    Reason diabetes mellitus type 2    Diagnosed at age 26    Timing constant    Quality not controlled    Severity is high    Complications neuropathy, left BKA, CAD, 2 stents placed      Diabetes regimen    Insulin by injections    Current glucose monitoring    Checks 4 times daily    Could not afford CGM    Am readings 80 up to 120       Daytime readings - 80 up to 200       No sugars less than 70    Review of Systems - General ROS: negative                Objective   Vital Signs:   /70   Pulse 61   Ht 172.7 cm (68\")   Wt 116 kg (255 lb)   SpO2 99%   BMI 38.77 kg/mý     Physical Exam  Neurological:      General: No focal deficit present.      Mental Status: She is alert.   Psychiatric:         Mood and Affect: Mood normal.         Thought Content: Thought content normal.      Result Review :   The following data was reviewed by: BEBE Prince on 02/21/2022:  Common labs          7/9/2023    13:08 7/16/2023    22:05 8/4/2023    11:47 8/4/2023    14:15   Common Labs   Glucose 273  545  85     BUN 13  14  23     Creatinine 0.89  0.88  1.29     Sodium 137  132  138     Potassium 3.7  3.9  3.6     Chloride 104  95  100     Calcium 9.1  9.1  9.4     Albumin 3.6  3.8  3.9     Total Bilirubin 0.2  0.3  0.3     Alkaline Phosphatase 96  112  112     AST (SGOT) 13  10  22     ALT (SGPT) 13  12  17     WBC 12.91  12.80  11.38     Hemoglobin 12.1  12.2  13.2     Hematocrit 36.6  36.1  40.0     Platelets 415  443  422     Total Cholesterol   167     Triglycerides   211     HDL Cholesterol   39     LDL Cholesterol    92     Hemoglobin A1C   9.20     Microalbumin, Urine    <1.2                Assessment and Plan    Diagnoses and all orders for this " visit:    1. Type 2 diabetes mellitus with hyperglycemia, with long-term current use of insulin (Primary)  -     CBC & Differential; Future  -     Comprehensive Metabolic Panel; Future  -     Hemoglobin A1c; Future  -     Lipid Panel; Future  -     Microalbumin / Creatinine Urine Ratio - Urine, Clean Catch; Future  -     TSH; Future  -     Vitamin B12; Future  -     Vitamin D,25-Hydroxy; Future    2. Mixed hyperlipidemia  -     CBC & Differential; Future  -     Comprehensive Metabolic Panel; Future  -     Hemoglobin A1c; Future  -     Lipid Panel; Future  -     Microalbumin / Creatinine Urine Ratio - Urine, Clean Catch; Future  -     TSH; Future  -     Vitamin B12; Future  -     Vitamin D,25-Hydroxy; Future    3. Vitamin D deficiency  -     CBC & Differential; Future  -     Comprehensive Metabolic Panel; Future  -     Hemoglobin A1c; Future  -     Lipid Panel; Future  -     Microalbumin / Creatinine Urine Ratio - Urine, Clean Catch; Future  -     TSH; Future  -     Vitamin B12; Future  -     Vitamin D,25-Hydroxy; Future    4. Primary hypertension  -     CBC & Differential; Future  -     Comprehensive Metabolic Panel; Future  -     Hemoglobin A1c; Future  -     Lipid Panel; Future  -     Microalbumin / Creatinine Urine Ratio - Urine, Clean Catch; Future  -     TSH; Future  -     Vitamin B12; Future  -     Vitamin D,25-Hydroxy; Future    5. Acquired hypothyroidism    6. Stage 3a chronic kidney disease                 Glycemic Management     Diabetes mellitus not controlled         Lab Results   Component Value Date    HGBA1C 9.20 (H) 08/04/2023             Dexcom sensor ---off due to finances          Basaglar taking 40 units am  and 45 units pm              Novolog taking for meals     Taking 40 units up to 45 units TID    We discussed adjusting for meals and bg         + sliding scale                ==================     Jardiance-- stopped       Metformin--stopped       bydureon - stopped     Victoza stopped due  to side effects         januvia 100 mg daily  -stopped               Lipid Management        Taking lipitor 80- mg one at night                 Total Cholesterol   Date Value Ref Range Status   08/04/2023 167 0 - 200 mg/dL Final     Triglycerides   Date Value Ref Range Status   08/04/2023 211 (H) 0 - 150 mg/dL Final     HDL Cholesterol   Date Value Ref Range Status   08/04/2023 39 (L) 40 - 60 mg/dL Final     LDL Cholesterol    Date Value Ref Range Status   08/04/2023 92 0 - 100 mg/dL Final           Blood Pressure Management:          Taking norvasc  5 mg daily     Taking metoprolol         Microvascular Complication Monitoring:     Neurontin 100 mg TID                 intermittetly takes reglan for gastroparesis     states eye exam was March 2021, no DR      Left BKA     Follows with podiatry         ckd stage III                Other Diabetes Related Aspects     TSH abnormal from July to Sept 2014           Taking  levothyroxine 50 mcg --missed doses , no changes         Lab Results   Component Value Date    TSH 6.730 (H) 08/04/2023       Vitamin D deficiency        Taking vitamin D supplement                      Follow Up   No follow-ups on file.  Patient was given instructions and counseling regarding her condition or for health maintenance advice. Please see specific information pulled into the AVS if appropriate.         This document has been electronically signed by BEBE Prince on August 8, 2023 08:40 CDT.

## 2023-08-14 NOTE — H&P
[facility] History and Physical  CHIQUITA BAILEY  63-year-old; :1960;;female  [address];[place] [address]  Ins: 1) ANTHEM/BCBS  MRN:[id number]  JERMAINE ADAMS MD  [facility] [place]  Allergies  morphine Unknown  Percocet Unknown  Seroquel Unknown  AVANDIA Unknown  Current Medications  FLUTICASONE 50MCG RX SPR 50.000  DICYCLOMINE 20MG TAB 20.000  VITAMIN D2 50,000IU CAP 35315.000  FUROSEMIDE 40MG TAB 40.000  METOPROL SUC 50MG ER TAB 50.000  LEVOTHYROXIN 25MCG TAB 25.000  CYANOCOBALAM 1000MCG INJ 1000.000  TAMSULOSIN 0.4MG CAP 0.400  ONDANSETRON 8MG TAB 8.000  NOVOLOG F/P 100U/ML .000  NITROGLYCERN 0.4MG SUB 0.400  MELOXICAM 15MG TAB 15.000  MECLIZIN RX 25MG TAB 25.000  CLOPIDOGREL 75MG TAB 75.000  AMLODIPINE 5MG TAB 5.000  ATORVASTATIN 80MG TAB 80.000  ISOSORB MONO 30MG ER TAB 30.000  AMOX-CLAV 875-125 MG TABLET TAKE 1  TABLET BY MOUTH TWICE A DAY FOR 7 DAYS  HYDROCHLOROT 12.5MG Tablets  VENLAFAXINE HCL ER 75 MG CAP TAKE 1 CAPSULE BY MOUTH DAILY WITH  BREAKFAST.  * Medications Reconciled  Problem List  Retention of urine 2023  Medical History  Heart disease  Essential hypertension  Diabetes mellitus  HAS HAD COLONSCOPY IN LAST 9 YEARS  Surgical History  HYSTERECTOMY UNSP  OPEN HEART SURGERY  GALLBLADDER SURGERY  Leg Surgery  below knee amputation  Family History  Heart disease  Diabetes mellitus  Essential hypertension  Sister Alive; Sister Alive; Mother ; Father   Social History  Patient never smoked. Does not drink. Retired. . Lives with .  Chief Complaint  BH follow up  History of Present Illness  CHIQUITA BAILEY is a 63-year-old female here for evaluation. She has a history of cystoscopy on 23 for urinary retention, findings were wide open urethra and  catheter cystitis. Due for InterStim battery change. No feelings of UTI or reteniton.  Location: bladder  Severity: moderate  Onset / Duration: greater than  1 year  Timing: daily  Modifying factors: InterStim  Associated signs and symptoms: no retention at present  Review of Systems  Eyes:Denies: blurry vision, pain in the eyes and double vision  ENMT:Denies: ear pain, sore throat and sinus problems  Cardiovascular:Reports: angina ; Denies: chest pain, varicose veins and syncope  Respiratory:Denies: shortness of breath, wheezing and frequent cough  Gastrointestinal:Denies: abdominal pain, nausea, vomiting, indigestion and heartburn  Genitourinary:Reports: other symptoms (see HPI)  Musculoskeletal:Reports: back pain ; Denies: joint pain and neck pain  Integumentary:Reports: skin rash ; Denies: boils and persistent itch  Neurological:Denies: tremors, dizzy spells and numbness / tingling  Psychiatric:Reports: depression and anxiety ; Denies: generally satisfied with life and suicidal ideation  Endocrine:Denies: Excessive thirst, cold intolerance, heat intolerance and  tired/sluggish  Hematologic/Lymphatic:Denies: swollen glands and blood clotting problem  Allergic/Immunologic:Denies: hayfever  Constitutional:Denies: fever, chills and weight loss  Vital Signs  Weight: 255 (lb) Resp Rate: 18 (/min)  Height: 68 (in) BMI: 38.77  Never smoker  Exam  General appearance: The patient appears well developed and well nourished, in no apparent acute distress.  Examination of pupils and irises: No redness or drainage noted.  Assessment of hearing: Responds to verbal command.  Examination of neck: Neck appears supple. No masses noted.  Assessment of respiratory effort: Respiratory effort appears normal. No apparent  distress.  Examination of gait and station: Normal gait and stature.  Head and neck (Assessment of range of motion): Adequate ROM noted in all  extremities.  Head and neck (Assessment of muscle strength and tone): Muscle strength and tone normal for age.  Inspection of skin and subcutaneous tissue: Exam of the skin is within normal limits. The color and skin turgor are  normal. No lesions, bruises, rashes, or jaundice.  Orientation to time, place and person: Oriented to person, place, and time.  Mood and affect: Normal mood and affect.  Data Review  Medications and chart reviewed. History and physical form/ROS reviewed and new  form completed today. Previous encounter reviewed.  Assessment - Retention of urine  DX:  Retention of urine  SNO: [id number], ICD-10: R33.9  Assessment Notes  due for InterStim battery change -  Plan  Plan Notes:  Interstim Stage II (generator change)  Education:  Bladder Diseases  Discussion:  Discussed my findings and plan of action and reasoning behind decision making. All questions were answered. FINDINGS WERE DISCUSSED WITH PATIENT. RISKS  AND BENEFITS EXPLAINED. PATIENT WISHES TO PROCEED.  Instructions:  Patient is instructed to call with any problems. Patient is instructed to call if the  condition worsens. Patient is instructed to call with any changes in condition.

## 2023-08-15 ENCOUNTER — ANESTHESIA EVENT (OUTPATIENT)
Dept: PERIOP | Facility: HOSPITAL | Age: 63
End: 2023-08-15
Payer: COMMERCIAL

## 2023-08-16 ENCOUNTER — ANESTHESIA (OUTPATIENT)
Dept: PERIOP | Facility: HOSPITAL | Age: 63
End: 2023-08-16
Payer: COMMERCIAL

## 2023-08-16 ENCOUNTER — APPOINTMENT (OUTPATIENT)
Dept: GENERAL RADIOLOGY | Facility: HOSPITAL | Age: 63
End: 2023-08-16
Payer: COMMERCIAL

## 2023-08-16 ENCOUNTER — HOSPITAL ENCOUNTER (OUTPATIENT)
Facility: HOSPITAL | Age: 63
Setting detail: HOSPITAL OUTPATIENT SURGERY
Discharge: HOME | End: 2023-08-16
Attending: UROLOGY | Admitting: UROLOGY
Payer: COMMERCIAL

## 2023-08-16 VITALS
OXYGEN SATURATION: 95 % | RESPIRATION RATE: 18 BRPM | SYSTOLIC BLOOD PRESSURE: 130 MMHG | HEART RATE: 72 BPM | HEIGHT: 68 IN | DIASTOLIC BLOOD PRESSURE: 60 MMHG | TEMPERATURE: 97.1 F | WEIGHT: 255 LBS | BODY MASS INDEX: 38.65 KG/M2

## 2023-08-16 DIAGNOSIS — Z45.42 BATTERY END OF LIFE OF SPINAL CORD STIMULATOR: ICD-10-CM

## 2023-08-16 DIAGNOSIS — N32.81 OVERACTIVE BLADDER: Primary | ICD-10-CM

## 2023-08-16 LAB — GLUCOSE BLDC GLUCOMTR-MCNC: 100 MG/DL (ref 70–130)

## 2023-08-16 PROCEDURE — 25010000002 FENTANYL CITRATE (PF) 100 MCG/2ML SOLUTION: Performed by: NURSE ANESTHETIST, CERTIFIED REGISTERED

## 2023-08-16 PROCEDURE — 82948 REAGENT STRIP/BLOOD GLUCOSE: CPT

## 2023-08-16 PROCEDURE — 25010000002 HYDROMORPHONE 1 MG/ML SOLUTION: Performed by: NURSE ANESTHETIST, CERTIFIED REGISTERED

## 2023-08-16 PROCEDURE — 25010000002 MIDAZOLAM PER 1 MG: Performed by: NURSE ANESTHETIST, CERTIFIED REGISTERED

## 2023-08-16 PROCEDURE — 25010000002 SUCCINYLCHOLINE PER 20 MG: Performed by: NURSE ANESTHETIST, CERTIFIED REGISTERED

## 2023-08-16 PROCEDURE — 76000 FLUOROSCOPY <1 HR PHYS/QHP: CPT

## 2023-08-16 PROCEDURE — 25010000002 BUPIVACAINE (PF) 0.5 % SOLUTION: Performed by: UROLOGY

## 2023-08-16 PROCEDURE — C1778 LEAD, NEUROSTIMULATOR: HCPCS | Performed by: UROLOGY

## 2023-08-16 PROCEDURE — C1787 PATIENT PROGR, NEUROSTIM: HCPCS | Performed by: UROLOGY

## 2023-08-16 PROCEDURE — 25010000002 ONDANSETRON PER 1 MG: Performed by: NURSE ANESTHETIST, CERTIFIED REGISTERED

## 2023-08-16 PROCEDURE — 25010000002 PROPOFOL 200 MG/20ML EMULSION: Performed by: NURSE ANESTHETIST, CERTIFIED REGISTERED

## 2023-08-16 PROCEDURE — 25010000002 CEFAZOLIN PER 500 MG: Performed by: UROLOGY

## 2023-08-16 PROCEDURE — 25010000002 CEFTRIAXONE PER 250 MG: Performed by: UROLOGY

## 2023-08-16 PROCEDURE — C1889 IMPLANT/INSERT DEVICE, NOC: HCPCS | Performed by: UROLOGY

## 2023-08-16 PROCEDURE — C1767 GENERATOR, NEURO NON-RECHARG: HCPCS | Performed by: UROLOGY

## 2023-08-16 DEVICE — NEUROSTM SACRAL/NRV INTERSTIMX NONRECHG: Type: IMPLANTABLE DEVICE | Site: BACK | Status: FUNCTIONAL

## 2023-08-16 DEVICE — KT LD NEUROSTM INTERSTIM SURESCAN FULLBDY 4.32MM: Type: IMPLANTABLE DEVICE | Site: BACK | Status: FUNCTIONAL

## 2023-08-16 DEVICE — ENV ANTIBAC TYRX NEURO ABS MD: Type: IMPLANTABLE DEVICE | Site: BACK | Status: FUNCTIONAL

## 2023-08-16 RX ORDER — FLUMAZENIL 0.1 MG/ML
0.2 INJECTION INTRAVENOUS AS NEEDED
Status: DISCONTINUED | OUTPATIENT
Start: 2023-08-16 | End: 2023-08-16 | Stop reason: HOSPADM

## 2023-08-16 RX ORDER — BUPIVACAINE HYDROCHLORIDE 5 MG/ML
INJECTION, SOLUTION EPIDURAL; INTRACAUDAL AS NEEDED
Status: DISCONTINUED | OUTPATIENT
Start: 2023-08-16 | End: 2023-08-16 | Stop reason: HOSPADM

## 2023-08-16 RX ORDER — ACETAMINOPHEN 650 MG/1
650 SUPPOSITORY RECTAL EVERY 4 HOURS PRN
Status: DISCONTINUED | OUTPATIENT
Start: 2023-08-16 | End: 2023-08-16 | Stop reason: HOSPADM

## 2023-08-16 RX ORDER — MIDAZOLAM HYDROCHLORIDE 1 MG/ML
INJECTION INTRAMUSCULAR; INTRAVENOUS AS NEEDED
Status: DISCONTINUED | OUTPATIENT
Start: 2023-08-16 | End: 2023-08-16 | Stop reason: SURG

## 2023-08-16 RX ORDER — LEVOFLOXACIN 250 MG/1
250 TABLET ORAL DAILY
Qty: 7 TABLET | Refills: 0 | Status: SHIPPED | OUTPATIENT
Start: 2023-08-16 | End: 2023-08-23

## 2023-08-16 RX ORDER — OXYCODONE AND ACETAMINOPHEN 7.5; 325 MG/1; MG/1
1 TABLET ORAL EVERY 6 HOURS PRN
Qty: 10 TABLET | Refills: 0 | Status: SHIPPED | OUTPATIENT
Start: 2023-08-16 | End: 2023-08-21

## 2023-08-16 RX ORDER — CEFAZOLIN SODIUM 1 G/3ML
INJECTION, POWDER, FOR SOLUTION INTRAMUSCULAR; INTRAVENOUS AS NEEDED
Status: DISCONTINUED | OUTPATIENT
Start: 2023-08-16 | End: 2023-08-16 | Stop reason: HOSPADM

## 2023-08-16 RX ORDER — FENTANYL CITRATE 50 UG/ML
INJECTION, SOLUTION INTRAMUSCULAR; INTRAVENOUS AS NEEDED
Status: DISCONTINUED | OUTPATIENT
Start: 2023-08-16 | End: 2023-08-16 | Stop reason: SURG

## 2023-08-16 RX ORDER — NALOXONE HCL 0.4 MG/ML
0.4 VIAL (ML) INJECTION AS NEEDED
Status: DISCONTINUED | OUTPATIENT
Start: 2023-08-16 | End: 2023-08-16 | Stop reason: HOSPADM

## 2023-08-16 RX ORDER — PROMETHAZINE HYDROCHLORIDE 25 MG/1
25 TABLET ORAL ONCE AS NEEDED
Status: DISCONTINUED | OUTPATIENT
Start: 2023-08-16 | End: 2023-08-16 | Stop reason: HOSPADM

## 2023-08-16 RX ORDER — PROPOFOL 10 MG/ML
INJECTION, EMULSION INTRAVENOUS AS NEEDED
Status: DISCONTINUED | OUTPATIENT
Start: 2023-08-16 | End: 2023-08-16 | Stop reason: SURG

## 2023-08-16 RX ORDER — ACETAMINOPHEN 325 MG/1
650 TABLET ORAL ONCE AS NEEDED
Status: DISCONTINUED | OUTPATIENT
Start: 2023-08-16 | End: 2023-08-16 | Stop reason: HOSPADM

## 2023-08-16 RX ORDER — ROCURONIUM BROMIDE 10 MG/ML
INJECTION, SOLUTION INTRAVENOUS AS NEEDED
Status: DISCONTINUED | OUTPATIENT
Start: 2023-08-16 | End: 2023-08-16 | Stop reason: SURG

## 2023-08-16 RX ORDER — SUCCINYLCHOLINE CHLORIDE 20 MG/ML
INJECTION INTRAMUSCULAR; INTRAVENOUS AS NEEDED
Status: DISCONTINUED | OUTPATIENT
Start: 2023-08-16 | End: 2023-08-16

## 2023-08-16 RX ORDER — OXYCODONE AND ACETAMINOPHEN 7.5; 325 MG/1; MG/1
1 TABLET ORAL EVERY 6 HOURS PRN
Status: DISCONTINUED | OUTPATIENT
Start: 2023-08-16 | End: 2023-08-16 | Stop reason: HOSPADM

## 2023-08-16 RX ORDER — SODIUM CHLORIDE 9 MG/ML
1000 INJECTION, SOLUTION INTRAVENOUS CONTINUOUS
Status: DISCONTINUED | OUTPATIENT
Start: 2023-08-16 | End: 2023-08-16 | Stop reason: HOSPADM

## 2023-08-16 RX ORDER — PROMETHAZINE HYDROCHLORIDE 25 MG/1
25 SUPPOSITORY RECTAL ONCE AS NEEDED
Status: DISCONTINUED | OUTPATIENT
Start: 2023-08-16 | End: 2023-08-16 | Stop reason: HOSPADM

## 2023-08-16 RX ORDER — SUCCINYLCHOLINE CHLORIDE 20 MG/ML
INJECTION INTRAMUSCULAR; INTRAVENOUS AS NEEDED
Status: DISCONTINUED | OUTPATIENT
Start: 2023-08-16 | End: 2023-08-16 | Stop reason: SURG

## 2023-08-16 RX ORDER — EPHEDRINE SULFATE 50 MG/ML
5 INJECTION, SOLUTION INTRAVENOUS ONCE AS NEEDED
Status: DISCONTINUED | OUTPATIENT
Start: 2023-08-16 | End: 2023-08-16 | Stop reason: HOSPADM

## 2023-08-16 RX ORDER — ONDANSETRON 2 MG/ML
INJECTION INTRAMUSCULAR; INTRAVENOUS AS NEEDED
Status: DISCONTINUED | OUTPATIENT
Start: 2023-08-16 | End: 2023-08-16 | Stop reason: SURG

## 2023-08-16 RX ORDER — ONDANSETRON 2 MG/ML
4 INJECTION INTRAMUSCULAR; INTRAVENOUS ONCE AS NEEDED
Status: DISCONTINUED | OUTPATIENT
Start: 2023-08-16 | End: 2023-08-16 | Stop reason: HOSPADM

## 2023-08-16 RX ADMIN — PROPOFOL 150 MG: 10 INJECTION, EMULSION INTRAVENOUS at 11:46

## 2023-08-16 RX ADMIN — MIDAZOLAM 1 MG: 1 INJECTION, SOLUTION INTRAMUSCULAR; INTRAVENOUS at 11:42

## 2023-08-16 RX ADMIN — ONDANSETRON 4 MG: 2 INJECTION INTRAMUSCULAR; INTRAVENOUS at 13:14

## 2023-08-16 RX ADMIN — FENTANYL CITRATE 50 MCG: 50 INJECTION, SOLUTION INTRAMUSCULAR; INTRAVENOUS at 11:46

## 2023-08-16 RX ADMIN — SUCCINYLCHOLINE CHLORIDE 160 MG: 20 INJECTION, SOLUTION INTRAMUSCULAR; INTRAVENOUS at 11:46

## 2023-08-16 RX ADMIN — OXYCODONE HYDROCHLORIDE AND ACETAMINOPHEN 1 TABLET: 7.5; 325 TABLET ORAL at 14:36

## 2023-08-16 RX ADMIN — SODIUM CHLORIDE 1000 ML: 9 INJECTION, SOLUTION INTRAVENOUS at 08:55

## 2023-08-16 RX ADMIN — FENTANYL CITRATE 50 MCG: 50 INJECTION, SOLUTION INTRAMUSCULAR; INTRAVENOUS at 12:18

## 2023-08-16 RX ADMIN — FENTANYL CITRATE 50 MCG: 50 INJECTION, SOLUTION INTRAMUSCULAR; INTRAVENOUS at 12:57

## 2023-08-16 RX ADMIN — ROCURONIUM BROMIDE 5 MG: 10 INJECTION INTRAVENOUS at 11:46

## 2023-08-16 RX ADMIN — MIDAZOLAM 1 MG: 1 INJECTION, SOLUTION INTRAMUSCULAR; INTRAVENOUS at 13:20

## 2023-08-16 RX ADMIN — HYDROMORPHONE HYDROCHLORIDE 0.25 MG: 1 INJECTION, SOLUTION INTRAMUSCULAR; INTRAVENOUS; SUBCUTANEOUS at 13:55

## 2023-08-16 NOTE — INTERVAL H&P NOTE
H&P reviewed. The patient was examined and there are no changes to the H&P.      Temp:  [97.4 øF (36.3 øC)] 97.4 øF (36.3 øC)  Heart Rate:  [65] 65  Resp:  [18] 18  BP: (126)/(60) 126/60    Past Medical History:   Diagnosis Date    Acute bacterial endocarditis 03/13/2021    Angina, class IV     Anxiety     Anxiety and depression     Arthritis     Benign paroxysmal positional vertigo     Bladder disorder     has bladder stimulator    Carpal tunnel syndrome     CHF (congestive heart failure)     Chronic pain     Coronary atherosclerosis     hx CABG 2005.  is followed by Dr Kwon    Depression     Diabetes mellitus     Type 2, controlled    Diabetic polyneuropathy     Disease of thyroid gland     Elevated cholesterol     Female stress incontinence     Foot pain, left     Full dentures     Gastroparesis     GERD (gastroesophageal reflux disease)     Hyperlipidemia     Hypertension     Low back pain     Malaise and fatigue     Meniere disease     Multiple joint pain     Obesity     Refuses to be weighed    Occlusion and stenosis of bilateral carotid arteries 06/18/2021    Otalgia     Both    Perforation of tympanic membrane     Left    Postoperative wound infection     Vitamin D deficiency     Wears glasses     reading       Past Surgical History:   Procedure Laterality Date    ABDOMINAL SURGERY      AMPUTATION FOOT      ANGIOPLASTY      coronary    ANKLE ARTHROSCOPY Left 02/26/2021    Procedure: Left foot hardware removal, ankle arthroscopy, bone grafting , left foot exostectomy;  Surgeon: Ignacio Lord DPM;  Location: [facility] OR;  Service: Podiatry;  Laterality: Left;    BREAST BIOPSY Right     CARDIAC CATHETERIZATION      CARDIAC CATHETERIZATION N/A 06/20/2017    Procedure: Right Heart Cath;  Surgeon: Can Kwon MD PhD;  Location: [facility] CATH INVASIVE LOCATION;  Service:     CARDIAC CATHETERIZATION N/A 02/18/2020    Procedure: Left Heart Cath;  Surgeon: Catalina Cooper MD;  Location: [facility]  CATH INVASIVE LOCATION;  Service: Cardiology;  Laterality: N/A;    CARDIAC CATHETERIZATION N/A 04/06/2022    Procedure: Left Heart Cath;  Surgeon: Sheryl Navas MD;  Location: [facility] CATH INVASIVE LOCATION;  Service: Cardiology;  Laterality: N/A;    CARPAL TUNNEL RELEASE      CHOLECYSTECTOMY      COLONOSCOPY N/A 06/24/2020    Procedure: COLONOSCOPY;  Surgeon: Julián Maldonado MD;  Location: [facility] ENDOSCOPY;  Service: Gastroenterology;  Laterality: N/A;    CORONARY ARTERY BYPASS GRAFT  2005    CYSTOSCOPY N/A 5/27/2023    Procedure: FLEXIBLE CYSTOSCOPY;  Surgeon: Rohan Doe MD;  Location: [facility] OR;  Service: Urology;  Laterality: N/A;    ENDOSCOPY N/A 10/19/2018    Procedure: ESOPHAGOGASTRODUODENOSCOPY possible dilation;  Surgeon: Julián Maldonado MD;  Location: [facility] ENDOSCOPY;  Service: Gastroenterology    ENDOSCOPY N/A 06/24/2020    Procedure: ESOPHAGOGASTRODUODENOSCOPY WED appt please;  Surgeon: Julián Maldonado MD;  Location: [facility] ENDOSCOPY;  Service: Gastroenterology;  Laterality: N/A;    ENDOSCOPY N/A 06/10/2022    Procedure: ESOPHAGOGASTRODUODENOSCOPY   room 380;  Surgeon: Jeremiah Wilkins MD;  Location: [facility] ENDOSCOPY;  Service: Gastroenterology;  Laterality: N/A;    ENDOSCOPY N/A 1/10/2023    Procedure: ESOPHAGOGASTRODUODENOSCOPY;  Surgeon: Jeremiah Wilkins MD;  Location: [facility] ENDOSCOPY;  Service: Gastroenterology;  Laterality: N/A;    ENDOSCOPY AND COLONOSCOPY      FOOT SURGERY      Toes    FOOT SURGERY      GASTRIC BANDING      Revision, laparoscopic    HYSTERECTOMY      INCISION AND DRAINAGE LEG Left 03/12/2021    Procedure: Left ankle arthroscopic irrigation and debridement, screw removal;  Surgeon: Ignacio Lord DPM;  Location: [facility] OR;  Service: Podiatry;  Laterality: Left;    MOUTH SURGERY      SALPINGO OOPHORECTOMY      SHOULDER SURGERY      SUBTALAR ARTHRODESIS Left 01/16/2019    Procedure: LEFT FOOT HARDWARE REMOVAL, FIFTH METATARSAL , OPEN REDUCTION INTERNAL FIXATION,  CALCANEAL OSTEOTOMY;  Surgeon: Ignacio Lord DPM;  Location: [facility] OR;  Service: Podiatry    SUBTALAR ARTHRODESIS Left 10/16/2019    Procedure: foot hardware removal, subtalar joint fusion  possible de/reattachment of achilles tendon        (c-arm);  Surgeon: Ignacio Lord DPM;  Location: [facility] OR;  Service: Podiatry    SUBTALAR ARTHRODESIS Left 09/30/2020    Procedure: subtalar, talonavicular joint arthrodesis.  Removal hardware.          (c-arm);  Surgeon: Ignacio Lord DPM;  Location: [facility] OR;  Service: Podiatry;  Laterality: Left;    TRANSESOPHAGEAL ECHOCARDIOGRAM (LAMONTE)      With color flow       Family History   Problem Relation Age of Onset    Diabetes Other     Heart disease Other     Hypertension Other     Heart disease Mother     Stroke Mother     Hypertension Mother     Diabetes Sister     Heart disease Sister     Hypertension Sister     Heart disease Sister     Diabetes Sister     Hypertension Sister     Diabetes Sister     Diabetes Sister     Diabetes Sister     Diabetes Sister         Social History     Socioeconomic History    Marital status:    Tobacco Use    Smoking status: Never     Passive exposure: Never    Smokeless tobacco: Never   Vaping Use    Vaping Use: Never used   Substance and Sexual Activity    Alcohol use: No    Drug use: No    Sexual activity: Defer

## 2023-08-16 NOTE — ANESTHESIA PROCEDURE NOTES
Airway  Date/Time: 8/16/2023 11:49 AM  Airway not difficult    General Information and Staff    Patient location during procedure: OR  CRNA/CAA: Brenda Goldman CRNA  SRNA: Rigoberto Jj SRNA  Indications and Patient Condition    Preoxygenated: yes  MILS maintained throughout  Mask difficulty assessment: 0 - not attempted    Final Airway Details  Final airway type: endotracheal airway      Successful airway: ETT  Cuffed: yes   Successful intubation technique: direct laryngoscopy  Endotracheal tube insertion site: oral  Blade: Dewayne  Blade size: 3  ETT size (mm): 7.0  Cormack-Lehane Classification: grade I - full view of glottis  Placement verified by: chest auscultation and capnometry   Cuff volume (mL): 7  Measured from: lips  ETT/EBT  to lips (cm): 22  Number of attempts at approach: 1  Assessment: lips, teeth, and gum same as pre-op    Additional Comments  Intubated by RALPH Rodriguez

## 2023-08-16 NOTE — BRIEF OP NOTE
INTERSTIM STAGE 2 IMPLANTABLE GENERATOR PLACEMENT  Progress Note    Ariana Martinez  8/16/2023    Pre-op Diagnosis:   Battery end of life of spinal cord stimulator [Z45.42]       Post-Op Diagnosis Codes:     * Battery end of life of spinal cord stimulator [Z45.42]    Procedure/CPTr Codes:        Procedure(s):  INTERSTIM STAGE TWO IMPLANTABLE GENERATOR PLACEMENT BATTERY CHANGE, PLUS STAGE ONE LEAD PLACEMENT              Surgeon(s):  Rohan Doe MD    Anesthesia: Monitored Anesthesia Care    Staff:   Circulator: Mami Larson RN  Scrub Person: Kianna Abreu  Assistant: Bernadette Overton CSA  Assistant: Bernadette Overton CSA      Estimated Blood Loss: none    Urine Voided: * No values recorded between 8/16/2023 11:42 AM and 8/16/2023  1:07 PM *    Specimens:                Specimens       ID Source Type Tests Collected By Collected At Frozen?    A Back, Lower Hardware / Foreign Body TISSUE EXAM, [facility] ([facility])   Rohan Doe MD 8/16/23 1242 No    Description: OLD BATTERY    This specimen was not marked as sent.                  Drains:   [REMOVED] Urethral Catheter Non-latex 16 Fr. (Removed)       [REMOVED] Urethral Catheter Silicone 16 Fr. (Removed)       [REMOVED] External Urinary Catheter (Removed)       [REMOVED] External Urinary Catheter (Removed)       [REMOVED] External Urinary Catheter (Removed)       Findings: Malfunctioning InterStim        Complications: None    Assistant: Bernadette Overton CSA  was responsible for performing the following activities: Retraction and their skilled assistance was necessary for the success of this case.    Rohan Doe MD     Date: 8/16/2023  Time: 13:20 CDT

## 2023-08-16 NOTE — ANESTHESIA PREPROCEDURE EVALUATION
Anesthesia Evaluation     Patient summary reviewed and Nursing notes reviewed   no history of anesthetic complications:   NPO Solid Status: > 8 hours  NPO Liquid Status: > 2 hours           Airway   Mallampati: II  TM distance: >3 FB  Neck ROM: full  Possible difficult intubation and Large neck circumference  Comment: General anesthesia 10/16.19, [place] #2, ETT 7.5, Grade 1. General anesthesia 2/26/21, MAC #3, ETT 7.0, Grade IIa.      Final Airway Details  Final airway type: endotracheal airway        Successful airway: ETT  Cuffed: yes   Successful intubation technique: video laryngoscopy  Facilitating devices/methods: intubating stylet  Endotracheal tube insertion site: oral  Blade: Dewayne  Blade size: 3  ETT size (mm): 7.5  Cormack-Lehane Classification: grade I - full view of glottis  Placement verified by: chest auscultation and capnometry   Inital cuff pressure (cm H2O): 20  Measured from: lips  Number of attempts at approach: 1  Assessment: lips, teeth, and gum same as pre-op    Final Airway Details  Final airway type: endotracheal airway            Dental    (+) upper dentures and lower dentures    Pulmonary     breath sounds clear to auscultation  (+) ,decreased breath sounds  (-) COPD, asthma, sleep apnea, not a smoker    ROS comment: snoresFINDINGS: Low lung volumes.  No overt pulmonary vascular congestion, focal pulmonary  parenchymal opacity, pleural effusion or pneumothorax. Cardiac  silhouette is normal in size. Thoracic aorta contains  atherosclerotic calcification. Sternal suture wires appear stable  ÿ  IMPRESSION:  CONCLUSION:  No acute pulmonary disease.  ÿ  Electronically signed by:  Vamshi Rose MD  2/16/2020 10:07 AM  [facility] Workstation: [id number]  Cardiovascular   Exercise tolerance: good (4-7 METS)    ECG reviewed  PT is on anticoagulation therapy  Patient on routine beta blocker and Beta blocker given within 24 hours of surgery  Rhythm: regular  Rate: normal    (+) hypertension poorly  controlled 2 medications or greater, CAD, CABG () >6 Months, cardiac stents (1 stent in , unsure when 2nd stent was placed) Drug eluting stent more than 12 months ago CHF Diastolic >=55%, hyperlipidemia,  carotid artery disease carotid bilateral  (-) valvular problems/murmurs, dysrhythmias, angina, murmur, PVD, DVT    ROS comment: [id number]  Normal sinus rhythm  Normal ECG  When compared with ECG of 17-MAY-2023 08:10,  No significant change was found      TTE: 3/17/23  Interpretation Summary  ú  The study is technically difficult for diagnosis.  ú  Left ventricular systolic function is normal. Calculated left ventricular EF = 62% Left ventricular ejection fraction appears to be 61 - 65%.  ú  Left ventricular diastolic function is consistent with (grade I) impaired relaxation.      Cardiac Cath: 22  Conclusion:  1.  One-vessel obstructive coronary artery disease involving chronic total occlusion of the LAD.  LIMA to LAD is patent, target vessel is small in caliber with severe diffuse disease but not amenable to PCI  2.  Patent stents in the LAD extending into the diagonal  3.  Mild disease in the RCA and left circumflex which is unchanged from before  4.  Normal left-sided filling pressures    CTA 22:  CT FUNCTIONAL ANALYSIS:  Ejection Fraction      67    %  Diastolic Volume      134    ml  Systolic Volume       44    ml  Stroke Volume         90    ml  Cardiac Output        7.2    L/minute     IMPRESSION:  Abnormal CT coronary arteriogram.  1. There is a LIMA to LAD graft. The graft is very thin, very  poor flow and only faintly visualized (nonfunctional)  2. There are stents in the LAD and D1 branch. The LAD and D1  branch distal to the stents are occluded.  3. Normal circumflex.  4. Calcified plaque proximal right coronary artery causing  narrowing of 50-60%.          Neuro/Psych  (+) numbness (both feet), psychiatric history Anxiety and Depression  (-) seizures, TIA, CVA, headaches   GI/Hepatic/Renal/Endo    (+) morbid obesity, GERD well controlled, liver disease history of elevated LFT, renal disease (Creatinine 1.29) CRI, diabetes mellitus type 2 using insulin, thyroid problem hypothyroidism  (-) hepatitis    ROS Comment: HGB 10.2 HCT 30.6    Musculoskeletal         ROS comment: Left foot  Abdominal   (+) obese   Substance History   (-) alcohol use, drug use     OB/GYN negative ob/gyn ROS         Other   arthritis,     (-) history of cancer  ROS/Med Hx Other: BMI: 38.8  WBC: 11.38      T2DM: Last HgbA1C: 9.2    GERD: Controlled on Pepcid and Protonix.    Duplex Carotid 7/12/18  Interpretation Summary    ú Right internal carotid artery stenosis of 0-49%.  ú Left internal carotid artery stenosis of 0-49%                    Anesthesia Plan    ASA 4     MAC and general   total IV anesthesia  intravenous induction     Anesthetic plan, risks, benefits, and alternatives have been provided, discussed and informed consent has been obtained with: patient.

## 2023-08-16 NOTE — ANESTHESIA POSTPROCEDURE EVALUATION
Patient: Ariana Martinez    Procedure Summary       Date: 08/16/23 Room / Location: [facility] OR  /  [facility] OR    Anesthesia Start: 1142 Anesthesia Stop:     Procedure: INTERSTIM STAGE TWO IMPLANTABLE GENERATOR PLACEMENT BATTERY CHANGE, PLUS STAGE ONE LEAD PLACEMENT Diagnosis:       Battery end of life of spinal cord stimulator      (Battery end of life of spinal cord stimulator [Z45.42])    Surgeons: Nader, Rohan VERMA MD Provider: Brenda Goldman CRNA    Anesthesia Type: MAC, general ASA Status: 4            Anesthesia Type: MAC, general    Vitals  Vitals Value Taken Time   /73 08/16/23 1348   Temp 98.4 øF (36.9 øC) 08/16/23 1348   Pulse 77 08/16/23 1348   Resp 16 08/16/23 1348   SpO2 99 % 08/16/23 1348           Post Anesthesia Care and Evaluation    Patient location during evaluation: PACU  Patient participation: complete - patient cannot participate  Level of consciousness: sleepy but conscious  Pain score: 0  Pain management: adequate    Airway patency: patent  Anesthetic complications: No anesthetic complications  PONV Status: none  Cardiovascular status: acceptable  Respiratory status: acceptable, spontaneous ventilation and face mask  Hydration status: acceptable    Comments: /93, HR 79, RR 16,T-98.4, sat 98%, exchanging well, HOB elevated 45 degrees

## 2023-08-16 NOTE — DISCHARGE INSTRUCTIONS
Minor Surgery  Outpatient Instructions    General Information  You have had a minor surgical procedure and are not expected to require extensive treatment or a long recovery period.  However, the following information/instructions that are listed serve as guidelines to help you recover at home.    Activity:  As tolerated. Do not lift more than 10 pounds    Hygiene:  Keep area dry for 4 days    Dressing/Wound Care:  Keep steri strips on and dry. Do not take off. They will come off on their own.    Diet:  As tolerated    Important Points:  -Call your doctor to report any of the following:   -unusual or excessive bleeding/drainage   -pain not reduced/controlled by medication   -elevated temperature consistently greater that 100 degrees F   -increased swelling, redness, bruising, or tenderness in surgical area  -Take medications as prescribed by your physician  -If you have any questions, please contact your doctor or the Same Day Surgery Unit at   [phone]  -If a post-operative emergency occurs, report to your nearest ER

## 2023-08-17 LAB — REF LAB TEST METHOD: NORMAL

## 2023-08-17 NOTE — OP NOTE
INTERSTIM STAGE 2 IMPLANTABLE GENERATOR PLACEMENT  Procedure Note    Ariana Martinez  8/16/2023    Pre-op Diagnosis:   Battery end of life of spinal cord stimulator [Z45.42]    Post-op Diagnosis:     Post-Op Diagnosis Codes:     * Battery end of life of spinal cord stimulator [Z45.42]    Procedure(s):  INTERSTIM STAGE TWO IMPLANTABLE GENERATOR PLACEMENT BATTERY CHANGE, PLUS STAGE ONE LEAD PLACEMENT    Surgeon(s):  Rohan Doe MD    Anesthesia: Monitored Anesthesia Care    Staff:   Circulator: Mami Larson RN  Scrub Person: Kianna Abreu  Assistant: Bernadette Overton CSA    Assistant: Bernadette Overton CSA was responsible for performing the following activities: Retraction and Irrigation and their skilled assistance was necessary for the success of this case.     Estimated Blood Loss: none    Specimens:                ID Type Source Tests Collected by Time   A : OLD BATTERY Hardware / Foreign Body Back, Lower TISSUE EXAM, [facility] ([facility]) Rohan Doe MD 8/16/2023 1242         Drains:   [REMOVED] Urethral Catheter Non-latex 16 Fr. (Removed)       [REMOVED] Urethral Catheter Silicone 16 Fr. (Removed)       [REMOVED] External Urinary Catheter (Removed)       [REMOVED] External Urinary Catheter (Removed)       [REMOVED] External Urinary Catheter (Removed)       Findings: Malfunctioning InterStim dead battery leads out of position    Complications: None    Indications: Same    Description of Procedure: The patient was brought to the operating suite and placed in the supine position.  Following MAC anesthetic, the patient was placed in a prone position.  We did a sterile prep and drape of the lower back and buttocks.  At this point we checked the function of the InterStim that was then and found it was nonfunctional.  I made incision over the battery with the knife and electrocautery the battery was cut out of its pouch and then we remove the leads to the sacrum.  Once this was  accomplished, we took the fluoroscopy AP and cross-table lateral with the InterStim needles, found the S3 foramen in the patient's right-hand side, placed this into the foramen, got positive artemio as well as toe flexion.  With this in mind, we thought we were in good position, so subsequently we placed the InterStim needles, found the S3 foramen on the patient's right-hand side, placed this into the foramen, got positive artemio as well as toe flexion.  With this in mind, we thought we were in good position.  So subsequently we placed the InterStim needle probe into the S3 foramen and then placed the dilator over the top of that in the S3 foramen and then placed an InterStim lead, had positive response on the electrodes, and once this was accomplished we tunneled the patient's lateral aspect superior buttock region, made an incision, developed a pouch, and then we brought the electrode leads to this pouch and then connected it with the battery stimulator, secured the connection with a set screw.  Programming was accomplished in routine fashion.  We then irrigated out the pocket with antibiotic solution.  We closed the pocket with interrupted 2-0 Vicryl suture and also 2-0 Vicryl subcuticular stitching and then also closed the skin incision over top of the S3 foramen insertion site.  Sterile dressing was applied.  It should be noted before applying the sterile dressing, we checked the AP and cross-table lateral and found the leads were in good position.     Rohan Doe MD     Date: 8/16/2023  Time: 22:35 CDT

## 2023-08-18 ENCOUNTER — OFFICE VISIT (OUTPATIENT)
Dept: FAMILY MEDICINE CLINIC | Facility: CLINIC | Age: 63
End: 2023-08-18
Payer: COMMERCIAL

## 2023-08-18 VITALS
OXYGEN SATURATION: 95 % | WEIGHT: 255 LBS | DIASTOLIC BLOOD PRESSURE: 82 MMHG | HEIGHT: 68 IN | HEART RATE: 77 BPM | BODY MASS INDEX: 38.65 KG/M2 | SYSTOLIC BLOOD PRESSURE: 142 MMHG

## 2023-08-18 DIAGNOSIS — I10 ESSENTIAL HYPERTENSION: ICD-10-CM

## 2023-08-18 DIAGNOSIS — Z78.0 POSTMENOPAUSAL: ICD-10-CM

## 2023-08-18 DIAGNOSIS — Z51.81 MEDICATION MONITORING ENCOUNTER: ICD-10-CM

## 2023-08-18 DIAGNOSIS — G54.6 PHANTOM PAIN AFTER AMPUTATION OF LOWER EXTREMITY: ICD-10-CM

## 2023-08-18 DIAGNOSIS — M54.41 CHRONIC RIGHT-SIDED LOW BACK PAIN WITH RIGHT-SIDED SCIATICA: ICD-10-CM

## 2023-08-18 DIAGNOSIS — G89.29 CHRONIC RIGHT-SIDED LOW BACK PAIN WITH RIGHT-SIDED SCIATICA: ICD-10-CM

## 2023-08-18 DIAGNOSIS — Z79.4 TYPE 2 DIABETES MELLITUS WITH DIABETIC AUTONOMIC NEUROPATHY, WITH LONG-TERM CURRENT USE OF INSULIN: Primary | ICD-10-CM

## 2023-08-18 DIAGNOSIS — E53.8 B12 DEFICIENCY: ICD-10-CM

## 2023-08-18 DIAGNOSIS — E11.43 TYPE 2 DIABETES MELLITUS WITH DIABETIC AUTONOMIC NEUROPATHY, WITH LONG-TERM CURRENT USE OF INSULIN: Primary | ICD-10-CM

## 2023-08-18 DIAGNOSIS — Z12.11 COLON CANCER SCREENING: ICD-10-CM

## 2023-08-18 DIAGNOSIS — M79.671 RIGHT FOOT PAIN: ICD-10-CM

## 2023-08-18 LAB — GLUCOSE BLDC GLUCOMTR-MCNC: 84 MG/DL (ref 70–130)

## 2023-08-18 RX ORDER — FLURBIPROFEN SODIUM 0.3 MG/ML
SOLUTION/ DROPS OPHTHALMIC
COMMUNITY
Start: 2023-08-03

## 2023-08-18 RX ORDER — CYANOCOBALAMIN 1000 UG/ML
1000 INJECTION, SOLUTION INTRAMUSCULAR; SUBCUTANEOUS ONCE
Status: COMPLETED | OUTPATIENT
Start: 2023-08-18 | End: 2023-08-18

## 2023-08-18 RX ADMIN — CYANOCOBALAMIN 1000 MCG: 1000 INJECTION, SOLUTION INTRAMUSCULAR; SUBCUTANEOUS at 10:09

## 2023-08-18 NOTE — PROGRESS NOTES
Subjective   Ariana Martinez is a 63 y.o. female who presents to the office for chronic right-sided low back pain with right-sided sciatica, Phantom pain after amputation of lower extremity and type 2 diabetes with neuropathy follow-up.  For these issues she does take Norco and gabapentin, she has been on these for some time.  Currently she is on a short-term prescription for Percocet written by Dr. Doe as she is status post InterStim stage II implantable generator placement battery change plus stage I lead placement.  Has been told by Dr. Doe that when she is completed her Percocet prescription and she switches back over to her Norco which is a chronic prescription.  Denies any issues with these controlled medications.  Did obtain a tox assure within the last 6 months which showed positive Ethyl alcohol level.  She denies drinking any alcohol, tells me her parents were both alcoholics and does not want to participate in that substance.  Wondered if it could be because she was wearing an Unna boot when she had her lab specimen obtained.  Current on mammogram, she had this in January 2023.  Looks like has been sometime since last bone mass density.  Is complaining of right foot pain, constant right at the top of her foot.  Is requesting referral to Dr. Santamaria for colonoscopy screening.  History of Present Illness     The following portions of the patient's history were reviewed and updated as appropriate: allergies, current medications, past family history, past medical history, past social history, past surgical history and problem list.    Review of Systems   Constitutional:  Negative for chills, fatigue and fever.   HENT:  Negative for congestion, sneezing, sore throat and trouble swallowing.    Eyes:  Negative for visual disturbance.   Respiratory:  Negative for cough, chest tightness, shortness of breath and wheezing.    Cardiovascular:  Negative for chest pain, palpitations and leg swelling.    Gastrointestinal:  Negative for abdominal pain, constipation, diarrhea, nausea and vomiting.   Genitourinary:  Negative for dysuria, frequency and urgency.   Musculoskeletal:  Positive for arthralgias and back pain. Negative for neck pain.   Skin:  Negative for rash.   Neurological:  Negative for dizziness, weakness and headaches.   Psychiatric/Behavioral:          In the past two weeks the pt has not felt down, depressed, hopeless or lost interest in doing things   All other systems reviewed and are negative.    Objective   Physical Exam  Vitals and nursing note reviewed.   Constitutional:       Appearance: She is well-developed. She is ill-appearing (chronically).   HENT:      Head: Normocephalic and atraumatic.      Right Ear: External ear normal.      Left Ear: External ear normal.      Nose: Nose normal.      Mouth/Throat:      Lips: Pink.      Mouth: Mucous membranes are moist.      Pharynx: Oropharynx is clear. Uvula midline.   Eyes:      General: Lids are normal. Vision grossly intact. No scleral icterus.        Right eye: No discharge.         Left eye: No discharge.      Extraocular Movements: Extraocular movements intact.      Conjunctiva/sclera: Conjunctivae normal.      Pupils: Pupils are equal, round, and reactive to light.   Neck:      Thyroid: No thyromegaly.      Trachea: Trachea and phonation normal.   Cardiovascular:      Rate and Rhythm: Normal rate and regular rhythm.      Heart sounds: Normal heart sounds. No murmur heard.    No friction rub. No gallop.   Pulmonary:      Effort: Pulmonary effort is normal. No respiratory distress.      Breath sounds: Normal breath sounds and air entry. No wheezing or rales.   Abdominal:      General: Bowel sounds are normal. There is no distension.      Palpations: Abdomen is soft.      Tenderness: There is no abdominal tenderness. There is no guarding or rebound.   Musculoskeletal:         General: Normal range of motion.      Cervical back: Normal range of  motion and neck supple.      Left Lower Extremity: Left leg is amputated below knee.   Lymphadenopathy:      Cervical: No cervical adenopathy.   Skin:     General: Skin is warm and dry.      Capillary Refill: Capillary refill takes less than 2 seconds.      Findings: No rash.   Neurological:      Mental Status: She is alert and oriented to person, place, and time.      Cranial Nerves: No cranial nerve deficit.   Psychiatric:         Behavior: Behavior normal. Behavior is cooperative.         Thought Content: Thought content normal.         Judgment: Judgment normal.       Assessment & Plan   Diagnoses and all orders for this visit:    1. Type 2 diabetes mellitus with diabetic autonomic neuropathy, with long-term current use of insulin (Primary)  -     Comprehensive Metabolic Panel; Future  -     Hemoglobin A1c; Future    2. B12 deficiency  -     cyanocobalamin injection 1,000 mcg    3. Right foot pain  -     XR Foot 3+ View Right; Future    4. Phantom pain after amputation of lower extremity    5. Chronic right-sided low back pain with right-sided sciatica    6. Colon cancer screening  -     Ambulatory Referral to General Surgery    7. Postmenopausal  -     DEXA Bone Density Axial    8. Essential hypertension  -     CBC & Differential; Future  -     Comprehensive Metabolic Panel; Future  -     Lipid Panel; Future  -     Microalbumin / Creatinine Urine Ratio - Urine, Clean Catch; Future  -     T4, Free; Future  -     TSH; Future  -     Urinalysis With Culture If Indicated -; Future    9. Medication monitoring encounter  -     ToxASSURE Select 13 Discrete -; Future  -     Gabapentin level; Future    Is certainly encouraged to take her medications as ordered.  We will refill her [place].  She is encouraged to take them on an as-needed basis and certainly not at the same time regarding her Norco and gabapentin.  We will send through for bone mass density as it has been sometime.  We will send through for x-ray of her  right foot.  Fasting labs and medication monitoring labs are generated.  Informed that Dr. Santamaria's office will call for an appointment.  Cardiac diabetic diet, activity level and hydration are reviewed.     Patient understands the risks associated with this controlled medication, including tolerance and addiction.  Patient also agrees to only obtain this medication from me, and not from a another provider, unless that provider is covering for me in my absence.  Patient also agrees to be compliant in dosing, and not self adjust the dose of medication.  A signed controlled substance agreement is on file, and the patient has received a controlled substance education sheet at this a previous visit.  The patient has also signed a consent for treatment with a controlled substance as per [facility] policy. LESTER was obtained.  Reviewed [place] via PDMP.  Has been instructed that Ethyl alcohol will need to be negative in order for me to continue writing for her medications.  We will continue to monitor the situation.          This document has been electronically signed by BEBE Rob on August 18, 2023 18:10 CDT

## 2023-08-21 ENCOUNTER — TELEPHONE (OUTPATIENT)
Dept: FAMILY MEDICINE CLINIC | Facility: CLINIC | Age: 63
End: 2023-08-21
Payer: COMMERCIAL

## 2023-08-21 DIAGNOSIS — M54.41 CHRONIC RIGHT-SIDED LOW BACK PAIN WITH RIGHT-SIDED SCIATICA: Primary | ICD-10-CM

## 2023-08-21 DIAGNOSIS — G89.29 CHRONIC RIGHT-SIDED LOW BACK PAIN WITH RIGHT-SIDED SCIATICA: Primary | ICD-10-CM

## 2023-08-21 RX ORDER — HYDROCODONE BITARTRATE AND ACETAMINOPHEN 7.5; 325 MG/1; MG/1
1 TABLET ORAL EVERY 6 HOURS PRN
Qty: 120 TABLET | Refills: 0 | Status: SHIPPED | OUTPATIENT
Start: 2023-08-21

## 2023-08-21 NOTE — TELEPHONE ENCOUNTER
HYDROcodone-acetaminophen (NORCO) 7.5-325 MG per tablet SHE WAS ON  PERCOCET FROM ANOTHER PROVIDER AND HER NARCO GOT DC I INFORMED PATIENT THAT HER PROVIDER HOPE ALEXYS WILL HAVE TO BE INFORMED TO SEE WHAT CAN BE DONE

## 2023-08-22 DIAGNOSIS — S92.911A: Primary | ICD-10-CM

## 2023-08-22 DIAGNOSIS — M77.31 CALCANEAL SPUR OF FOOT, RIGHT: ICD-10-CM

## 2023-08-25 ENCOUNTER — OFFICE VISIT (OUTPATIENT)
Dept: PODIATRY | Facility: CLINIC | Age: 63
End: 2023-08-25
Payer: COMMERCIAL

## 2023-08-25 ENCOUNTER — HOSPITAL ENCOUNTER (OUTPATIENT)
Dept: ULTRASOUND IMAGING | Facility: HOSPITAL | Age: 63
Discharge: HOME | End: 2023-08-25
Payer: COMMERCIAL

## 2023-08-25 ENCOUNTER — LAB (OUTPATIENT)
Dept: LAB | Facility: HOSPITAL | Age: 63
End: 2023-08-25
Payer: COMMERCIAL

## 2023-08-25 VITALS
SYSTOLIC BLOOD PRESSURE: 168 MMHG | HEART RATE: 72 BPM | BODY MASS INDEX: 38.65 KG/M2 | HEIGHT: 68 IN | OXYGEN SATURATION: 99 % | DIASTOLIC BLOOD PRESSURE: 88 MMHG | WEIGHT: 255 LBS

## 2023-08-25 DIAGNOSIS — M79.671 RIGHT FOOT PAIN: ICD-10-CM

## 2023-08-25 DIAGNOSIS — L02.415 CELLULITIS AND ABSCESS OF RIGHT LOWER EXTREMITY: ICD-10-CM

## 2023-08-25 DIAGNOSIS — L03.115 CELLULITIS AND ABSCESS OF RIGHT LOWER EXTREMITY: ICD-10-CM

## 2023-08-25 DIAGNOSIS — M79.672 LEFT FOOT PAIN: ICD-10-CM

## 2023-08-25 DIAGNOSIS — M79.671 RIGHT FOOT PAIN: Primary | ICD-10-CM

## 2023-08-25 LAB
ALBUMIN SERPL-MCNC: 3.8 G/DL (ref 3.5–5.2)
ALBUMIN/GLOB SERPL: 1 G/DL
ALP SERPL-CCNC: 116 U/L (ref 39–117)
ALT SERPL W P-5'-P-CCNC: 15 U/L (ref 1–33)
ANION GAP SERPL CALCULATED.3IONS-SCNC: 12 MMOL/L (ref 5–15)
AST SERPL-CCNC: 18 U/L (ref 1–32)
BASOPHILS # BLD AUTO: 0.05 10*3/MM3 (ref 0–0.2)
BASOPHILS NFR BLD AUTO: 0.4 % (ref 0–1.5)
BILIRUB SERPL-MCNC: 0.3 MG/DL (ref 0–1.2)
BUN SERPL-MCNC: 17 MG/DL (ref 8–23)
BUN/CREAT SERPL: 17 (ref 7–25)
CALCIUM SPEC-SCNC: 9.2 MG/DL (ref 8.6–10.5)
CHLORIDE SERPL-SCNC: 101 MMOL/L (ref 98–107)
CO2 SERPL-SCNC: 24 MMOL/L (ref 22–29)
CREAT SERPL-MCNC: 1 MG/DL (ref 0.57–1)
CRP SERPL-MCNC: <0.3 MG/DL (ref 0–0.5)
DEPRECATED RDW RBC AUTO: 48 FL (ref 37–54)
EGFRCR SERPLBLD CKD-EPI 2021: 64.2 ML/MIN/1.73
EOSINOPHIL # BLD AUTO: 0.53 10*3/MM3 (ref 0–0.4)
EOSINOPHIL NFR BLD AUTO: 4.6 % (ref 0.3–6.2)
ERYTHROCYTE [DISTWIDTH] IN BLOOD BY AUTOMATED COUNT: 15.4 % (ref 12.3–15.4)
ERYTHROCYTE [SEDIMENTATION RATE] IN BLOOD: 13 MM/HR (ref 0–30)
GLOBULIN UR ELPH-MCNC: 3.8 GM/DL
GLUCOSE SERPL-MCNC: 285 MG/DL (ref 65–99)
HCT VFR BLD AUTO: 39.2 % (ref 34–46.6)
HGB BLD-MCNC: 12.7 G/DL (ref 12–15.9)
IMM GRANULOCYTES # BLD AUTO: 0.06 10*3/MM3 (ref 0–0.05)
IMM GRANULOCYTES NFR BLD AUTO: 0.5 % (ref 0–0.5)
LYMPHOCYTES # BLD AUTO: 2.16 10*3/MM3 (ref 0.7–3.1)
LYMPHOCYTES NFR BLD AUTO: 18.7 % (ref 19.6–45.3)
MCH RBC QN AUTO: 27.7 PG (ref 26.6–33)
MCHC RBC AUTO-ENTMCNC: 32.4 G/DL (ref 31.5–35.7)
MCV RBC AUTO: 85.4 FL (ref 79–97)
MONOCYTES # BLD AUTO: 0.81 10*3/MM3 (ref 0.1–0.9)
MONOCYTES NFR BLD AUTO: 7 % (ref 5–12)
NEUTROPHILS NFR BLD AUTO: 68.8 % (ref 42.7–76)
NEUTROPHILS NFR BLD AUTO: 7.97 10*3/MM3 (ref 1.7–7)
NRBC BLD AUTO-RTO: 0 /100 WBC (ref 0–0.2)
PLATELET # BLD AUTO: 401 10*3/MM3 (ref 140–450)
PMV BLD AUTO: 9.5 FL (ref 6–12)
POTASSIUM SERPL-SCNC: 4.1 MMOL/L (ref 3.5–5.2)
PROT SERPL-MCNC: 7.6 G/DL (ref 6–8.5)
RBC # BLD AUTO: 4.59 10*6/MM3 (ref 3.77–5.28)
SODIUM SERPL-SCNC: 137 MMOL/L (ref 136–145)
WBC NRBC COR # BLD: 11.58 10*3/MM3 (ref 3.4–10.8)

## 2023-08-25 PROCEDURE — 99214 OFFICE O/P EST MOD 30 MIN: CPT | Performed by: NURSE PRACTITIONER

## 2023-08-25 PROCEDURE — 80053 COMPREHEN METABOLIC PANEL: CPT

## 2023-08-25 PROCEDURE — 93971 EXTREMITY STUDY: CPT

## 2023-08-25 PROCEDURE — 36415 COLL VENOUS BLD VENIPUNCTURE: CPT

## 2023-08-25 PROCEDURE — 85652 RBC SED RATE AUTOMATED: CPT

## 2023-08-25 PROCEDURE — 86140 C-REACTIVE PROTEIN: CPT

## 2023-08-25 PROCEDURE — 93926 LOWER EXTREMITY STUDY: CPT

## 2023-08-25 PROCEDURE — 85025 COMPLETE CBC W/AUTO DIFF WBC: CPT

## 2023-08-25 RX ORDER — DOXYCYCLINE HYCLATE 100 MG/1
100 CAPSULE ORAL 2 TIMES DAILY
Qty: 20 CAPSULE | Refills: 0 | Status: SHIPPED | OUTPATIENT
Start: 2023-08-25

## 2023-08-25 NOTE — PROGRESS NOTES
Ariana Martinez  1960  63 y.o. female      08/25/2023    Chief Complaint   Patient presents with    Right Foot - Pain       History of Present Illness    Ariana Martinez is a 63 y.o.female Patient presents to clinic for right foot and ankle pain.Patient had x-rays on 8/18/23      Past Medical History:   Diagnosis Date    Acute bacterial endocarditis 03/13/2021    Angina, class IV     Anxiety     Anxiety and depression     Arthritis     Benign paroxysmal positional vertigo     Bladder disorder     has bladder stimulator    Carpal tunnel syndrome     CHF (congestive heart failure)     Chronic pain     Coronary atherosclerosis     hx CABG 2005.  is followed by Dr Cher Amaya     Diabetes mellitus     Type 2, controlled    Diabetic polyneuropathy     Disease of thyroid gland     Elevated cholesterol     Female stress incontinence     Foot pain, left     Full dentures     Gastroparesis     GERD (gastroesophageal reflux disease)     Hyperlipidemia     Hypertension     Low back pain     Malaise and fatigue     Meniere disease     Multiple joint pain     Obesity     Refuses to be weighed    Occlusion and stenosis of bilateral carotid arteries 06/18/2021    Otalgia     Both    Perforation of tympanic membrane     Left    Postoperative wound infection     Vitamin D deficiency     Wears glasses     reading         Past Surgical History:   Procedure Laterality Date    ABDOMINAL SURGERY      AMPUTATION FOOT      ANGIOPLASTY      coronary    ANKLE ARTHROSCOPY Left 02/26/2021    Procedure: Left foot hardware removal, ankle arthroscopy, bone grafting , left foot exostectomy;  Surgeon: Ignacio Lord DPM;  Location: [facility] OR;  Service: Podiatry;  Laterality: Left;    BREAST BIOPSY Right     CARDIAC CATHETERIZATION      CARDIAC CATHETERIZATION N/A 06/20/2017    Procedure: Right Heart Cath;  Surgeon: Can Kwon MD PhD;  Location: [facility] CATH INVASIVE LOCATION;  Service:     CARDIAC CATHETERIZATION  N/A 02/18/2020    Procedure: Left Heart Cath;  Surgeon: Catalina Cooper MD;  Location: [facility] CATH INVASIVE LOCATION;  Service: Cardiology;  Laterality: N/A;    CARDIAC CATHETERIZATION N/A 04/06/2022    Procedure: Left Heart Cath;  Surgeon: Sheryl Navas MD;  Location: [facility] CATH INVASIVE LOCATION;  Service: Cardiology;  Laterality: N/A;    CARPAL TUNNEL RELEASE      CHOLECYSTECTOMY      COLONOSCOPY N/A 06/24/2020    Procedure: COLONOSCOPY;  Surgeon: Julián Maldonado MD;  Location: [facility] ENDOSCOPY;  Service: Gastroenterology;  Laterality: N/A;    CORONARY ARTERY BYPASS GRAFT  2005    CYSTOSCOPY N/A 5/27/2023    Procedure: FLEXIBLE CYSTOSCOPY;  Surgeon: Rohan Doe MD;  Location: [facility] OR;  Service: Urology;  Laterality: N/A;    ENDOSCOPY N/A 10/19/2018    Procedure: ESOPHAGOGASTRODUODENOSCOPY possible dilation;  Surgeon: Julián Maldonado MD;  Location: [facility] ENDOSCOPY;  Service: Gastroenterology    ENDOSCOPY N/A 06/24/2020    Procedure: ESOPHAGOGASTRODUODENOSCOPY WED appt please;  Surgeon: Julián Maldonado MD;  Location: [facility] ENDOSCOPY;  Service: Gastroenterology;  Laterality: N/A;    ENDOSCOPY N/A 06/10/2022    Procedure: ESOPHAGOGASTRODUODENOSCOPY   room 380;  Surgeon: Jeremiah Wilkins MD;  Location: [facility] ENDOSCOPY;  Service: Gastroenterology;  Laterality: N/A;    ENDOSCOPY N/A 1/10/2023    Procedure: ESOPHAGOGASTRODUODENOSCOPY;  Surgeon: Jeremiah Wilkins MD;  Location: [facility] ENDOSCOPY;  Service: Gastroenterology;  Laterality: N/A;    ENDOSCOPY AND COLONOSCOPY      FOOT SURGERY      Toes    FOOT SURGERY      GASTRIC BANDING      Revision, laparoscopic    HYSTERECTOMY      INCISION AND DRAINAGE LEG Left 03/12/2021    Procedure: Left ankle arthroscopic irrigation and debridement, screw removal;  Surgeon: Ignacio Lord DPM;  Location: [facility] OR;  Service: Podiatry;  Laterality: Left;    MOUTH SURGERY      SALPINGO OOPHORECTOMY      SHOULDER SURGERY      SUBTALAR ARTHRODESIS Left  01/16/2019    Procedure: LEFT FOOT HARDWARE REMOVAL, FIFTH METATARSAL , OPEN REDUCTION INTERNAL FIXATION, CALCANEAL OSTEOTOMY;  Surgeon: Ignacio Lord DPM;  Location: [facility];  Service: Podiatry    SUBTALAR ARTHRODESIS Left 10/16/2019    Procedure: foot hardware removal, subtalar joint fusion  possible de/reattachment of achilles tendon        (c-arm);  Surgeon: Ignacio Lord DPM;  Location: [facility] OR;  Service: Podiatry    SUBTALAR ARTHRODESIS Left 09/30/2020    Procedure: subtalar, talonavicular joint arthrodesis.  Removal hardware.          (c-arm);  Surgeon: Ignacio Lord DPM;  Location: [facility] OR;  Service: Podiatry;  Laterality: Left;    TRANSESOPHAGEAL ECHOCARDIOGRAM (LAMOTNE)      With color flow         Family History   Problem Relation Age of Onset    Diabetes Other     Heart disease Other     Hypertension Other     Heart disease Mother     Stroke Mother     Hypertension Mother     Diabetes Sister     Heart disease Sister     Hypertension Sister     Heart disease Sister     Diabetes Sister     Hypertension Sister     Diabetes Sister     Diabetes Sister     Diabetes Sister     Diabetes Sister        Allergies   Allergen Reactions    Seroquel [Quetiapine] Anaphylaxis    Avandia [Rosiglitazone] Swelling    Morphine And Related Hallucinations    Oxycodone Hallucinations       Social History     Socioeconomic History    Marital status:    Tobacco Use    Smoking status: Never     Passive exposure: Never    Smokeless tobacco: Never   Vaping Use    Vaping Use: Never used   Substance and Sexual Activity    Alcohol use: No    Drug use: No    Sexual activity: Defer         Current Outpatient Medications   Medication Sig Dispense Refill    amLODIPine (NORVASC) 5 MG tablet Take 1 tablet by mouth Daily. 90 tablet 1    ARIPiprazole (ABILIFY) 5 MG tablet TAKE 1 TABLET BY MOUTH EVERY DAY (Patient taking differently: Take 1 tablet by mouth Daily.) 30 tablet 0    aspirin  MG tablet Take 1 tablet  by mouth Daily. 30 tablet 0    atorvastatin (LIPITOR) 80 MG tablet Take 1 tablet by mouth Daily. 90 tablet 1    B-D UF III MINI PEN NEEDLES 31G X 5 MM misc       B-D ULTRAFINE III SHORT PEN 31G X 8 MM misc USE TO INJECT 5 TIMES A  each 11    B-D ULTRAFINE III SHORT PEN 31G X 8 MM misc USE UP TO 4 (FOUR) TIMES DAILY WITH INSULIN AS DIRECTED. 200 each 1    BD SHARPS CONTAINER HOME misc 1 each Take As Directed. 1 each 0    Blood Glucose Monitoring Suppl (ONE TOUCH ULTRA MINI) w/Device kit Patient will need the Accu-Check Avitio meter 1 each 0    butalbital-acetaminophen-caffeine (FIORICET, ESGIC) -40 MG per tablet Take one to two tablets by mouth per headache episode as needed. (Patient taking differently: Take 2 tablets by mouth Take As Directed. Take one to two tablets by mouth per headache episode as needed.) 6 tablet 0    Calcium Citrate-Vitamin D 250-200 MG-UNIT tablet Take 2 tablets by mouth 2 (two) times a day.      clopidogrel (PLAVIX) 75 MG tablet TAKE 1 TABLET BY MOUTH EVERY DAY (Patient taking differently: Take 1 tablet by mouth Daily.) 90 tablet 0    cyanocobalamin 1000 MCG/ML injection Inject 1 mL into the appropriate muscle as directed by prescriber Every 28 (Twenty-Eight) Days. 3 mL 0    dicyclomine (BENTYL) 20 MG tablet Take 1 tablet by mouth Every 6 (Six) Hours.      famotidine (PEPCID) 40 MG tablet Take 1 tablet by mouth Daily.      fluticasone (FLONASE) 50 MCG/ACT nasal spray INSTILL 2 SPRAYS IN EACH NOSTRIL AS DIRECTED BY PROVIDER DAILY (Patient taking differently: As Needed.) 16 mL 5    folic acid (FOLVITE) 1 MG tablet TAKE 1 TABLET BY MOUTH EVERY DAY (Patient taking differently: Take 1 tablet by mouth Daily.) 90 tablet 1    furosemide (LASIX) 40 MG tablet TAKE 1 TABLET BY MOUTH EVERY DAY (Patient taking differently: Take 1 tablet by mouth Daily.) 30 tablet 5    gabapentin (NEURONTIN) 100 MG capsule Take 2 capsules by mouth Every 12 (Twelve) Hours. 6 capsule 0    glucose blood  (Accu-Chek Guide) test strip 1 each by Other route 4 (Four) Times a Day. To test blood sugar 4X daily. For  Dx E11.65 400 each 1    glucose monitor monitoring kit 1 each Daily. accucheck eve meter, E11.9 1 each 0    hydroCHLOROthiazide (HYDRODIURIL) 12.5 MG tablet Take 1 tablet by mouth Daily. 90 tablet 0    HYDROcodone-acetaminophen (NORCO) 7.5-325 MG per tablet Take 1 tablet by mouth Every 6 (Six) Hours As Needed for Moderate Pain. 120 tablet 0    Insulin Aspart (novoLOG) 100 UNIT/ML injection Inject 0-7 Units under the skin into the appropriate area as directed 3 (Three) Times a Day Before Meals. 30 mL 12    Insulin Glargine (BASAGLAR KWIKPEN) 100 UNIT/ML injection pen Inject 40 units into the appropriate area every morning and 35 units into the appropriate area every night (Patient taking differently: 45 Units 2 (Two) Times a Day. Inject 40 units into the appropriate area every morning and 35 units into the appropriate area every night) 30 mL 4    isosorbide mononitrate (IMDUR) 30 MG 24 hr tablet TAKE 1 TABLET BY MOUTH EVERY DAY (Patient taking differently: Take 1 tablet by mouth Daily.) 90 tablet 0    Lancets (accu-chek soft touch) lancets As directed 100 each 12    levothyroxine (Synthroid) 50 MCG tablet Take 1 tablet by mouth Daily. (Patient taking differently: Take 1 tablet by mouth Every Night.) 30 tablet 11    meclizine (ANTIVERT) 25 MG tablet Take 1 tablet by mouth 3 (Three) Times a Day As Needed for dizziness. 90 tablet 6    meloxicam (MOBIC) 15 MG tablet TAKE 1 TABLET BY MOUTH EVERY DAY WITH FOOD (Patient taking differently: Take 1 tablet by mouth Daily As Needed.) 30 tablet 3    methocarbamol (ROBAXIN) 500 MG tablet TAKE 1 TABLET BY MOUTH 2 TIMES A DAY AS NEEDED FOR MUSCLE SPASMS. (Patient taking differently: Take 1 tablet by mouth 2 (Two) Times a Day As Needed.) 60 tablet 5    metoclopramide (REGLAN) 10 MG tablet TAKE 1 TABLET BY MOUTH 4 (FOUR) TIMES A DAY AS NEEDED (NAUSEA). 120 tablet 0     "metoprolol succinate XL (TOPROL-XL) 50 MG 24 hr tablet TAKE 1 TABLET BY MOUTH EVERY DAY (Patient taking differently: Take 1 tablet by mouth Daily.) 90 tablet 1    naloxone (NARCAN) 0.4 MG/ML injection Infuse 0.5 mL into a venous catheter Every 5 (Five) Minutes As Needed for Opioid Reversal.      Needle, Disp, (Easy Touch FlipLock Needles) 25G X 1-1/2\" misc 1 each Every 28 (Twenty-Eight) Days. For administering B12 cyanocobalomin. Dx vitamin B12 deficiency. 10 each 3    Needle, Disp, (Hypodermic Needle) 20G X 1\" misc 1 each Every 28 (Twenty-Eight) Days. For drawing up B12 for injection monthly, change back to smaller gauge needle prior to injection. Dx Vitamin B12  Deficiency. 10 each 2    nitroglycerin (NITROSTAT) 0.4 MG SL tablet Place 1 tablet under the tongue Every 5 (Five) Minutes As Needed for Chest Pain. Take no more than 3 doses in 15 minutes. 20 tablet 11    ondansetron (ZOFRAN) 8 MG tablet Take 1 tablet by mouth Every 8 (Eight) Hours As Needed for Nausea or Vomiting. 18 tablet 0    pantoprazole (PROTONIX) 20 MG EC tablet Take 2 tablets by mouth Daily. 90 tablet 0    ranolazine (RANEXA) 500 MG 12 hr tablet TAKE 1 TABLET BY MOUTH EVERY 12 HOURS (Patient taking differently: Take 1 tablet by mouth 2 (Two) Times a Day.) 180 tablet 3    sucralfate (CARAFATE) 1 g tablet TAKE 1 TABLET BY MOUTH FOUR TIMES DAILY (Patient taking differently: Take  by mouth 4 (Four) Times a Day As Needed.) 120 tablet 0    Syringe 20G X 1-1/2\" 3 ML misc 1 each Every 28 (Twenty-Eight) Days. For injection cyanocobalomin, Dx vit B12 deficiency. 10 each 2    venlafaxine XR (EFFEXOR-XR) 75 MG 24 hr capsule Take 1 capsule by mouth Daily With Breakfast. 90 capsule 1    vitamin D (ERGOCALCIFEROL) 1.25 MG (87697 UT) capsule capsule TAKE 1 CAPSULE BY MOUTH EVERY 7 DAYS. (Patient taking differently: Take 1 capsule by mouth Every 7 (Seven) Days. TAKES EVERY SUNDAY) 36 capsule 0    doxycycline (VIBRAMYCIN) 100 MG capsule Take 1 capsule by mouth 2 " "(Two) Times a Day. 20 capsule 0     No current facility-administered medications for this visit.       Review of Systems   Musculoskeletal:         Right ankle and leg pain   All other systems reviewed and are negative.      OBJECTIVE    /88   Pulse 72   Ht 172.7 cm (68\")   Wt 116 kg (255 lb)   SpO2 99%   BMI 38.77 kg/mý     Body mass index is 38.77 kg/mý.        Physical Exam  Vitals reviewed.   Constitutional:       Appearance: Normal appearance. She is well-developed.   HENT:      Head: Normocephalic and atraumatic.   Neck:      Trachea: Trachea and phonation normal.   Cardiovascular:      Pulses:           Dorsalis pedis pulses are 1+ on the right side.        Posterior tibial pulses are 1+ on the right side.   Pulmonary:      Effort: Pulmonary effort is normal. No respiratory distress.   Abdominal:      General: There is no distension.      Palpations: Abdomen is soft.   Musculoskeletal:      Right foot: Decreased range of motion.      Left Lower Extremity: (Below-knee amputation)  Feet:      Right foot:      Skin integrity: Erythema present.      Comments: Right leg-Global swelling of the right lower leg was scattered erythema extending from the foot up to mid calf.  There is no skin breakdown noted  Skin:     General: Skin is warm and dry.   Neurological:      Mental Status: She is alert and oriented to person, place, and time.      GCS: GCS eye subscore is 4. GCS verbal subscore is 5. GCS motor subscore is 6.   Psychiatric:         Speech: Speech normal.         Behavior: Behavior normal. Behavior is cooperative.         Thought Content: Thought content normal.         Judgment: Judgment normal.              Procedures  Unna boot was placed on the right lower extremity, patient was neurovascularly intact post procedure      ASSESSMENT AND PLAN    Diagnoses and all orders for this visit:    1. Right foot pain (Primary)  -     XR Ankle 3+ View Right  -     US Arterial Doppler Lower Extremity Right; " Future  -     CBC & Differential; Future  -     Comprehensive Metabolic Panel; Future  -     C-reactive Protein; Future  -     Sedimentation Rate; Future    2. Cellulitis and abscess of right lower extremity  -     CBC & Differential; Future  -     Comprehensive Metabolic Panel; Future  -     C-reactive Protein; Future  -     Sedimentation Rate; Future    Other orders  -     doxycycline (VIBRAMYCIN) 100 MG capsule; Take 1 capsule by mouth 2 (Two) Times a Day.  Dispense: 20 capsule; Refill: 0      Body mass index is 38.77 kg/mý.    Recommend follow-up with primary care to discuss BMI greater than 30    Ultrasound to rule out DVT  Unna boot  Swelling modalities  CAM Walker boot  Continue modified weightbearing with a walker  Follow-up in 1 week for recheck and contact of the office or go to the ER for worsening symptoms          This document has been electronically signed by CARINA CheekP-C, ONP-C on August 25, 2023 13:31 CDT

## 2023-08-27 NOTE — OP NOTE
INTERSTIM STAGE 2 IMPLANTABLE GENERATOR PLACEMENT  Procedure Note    Ariana Martinez  8/16/2023    Pre-op Diagnosis:   Battery end of life of spinal cord stimulator [Z45.42]    Post-op Diagnosis:     Post-Op Diagnosis Codes:     * Battery end of life of spinal cord stimulator [Z45.42]    Procedure(s):  INTERSTIM STAGE TWO IMPLANTABLE GENERATOR PLACEMENT BATTERY CHANGE, PLUS STAGE ONE LEAD PLACEMENT    Surgeon(s):  Rohan Doe MD    Anesthesia: Monitored Anesthesia Care    Staff:   Circulator: Mami Larson RN  Scrub Person: Kianna Abreu  Assistant: Bernadette Overton CSA    Assistant: Bernadette Overton CSA was responsible for performing the following activities:  and their skilled assistance was necessary for the success of this case.     Estimated Blood Loss: none    Specimens:                ID Type Source Tests Collected by Time   A : OLD BATTERY Hardware / Foreign Body Back, Lower TISSUE EXAM, [facility] ([facility]) Rohan Doe MD 8/16/2023 1242         Drains:   [REMOVED] Urethral Catheter Non-latex 16 Fr. (Removed)       [REMOVED] Urethral Catheter Silicone 16 Fr. (Removed)       [REMOVED] External Urinary Catheter (Removed)       [REMOVED] External Urinary Catheter (Removed)       [REMOVED] External Urinary Catheter (Removed)       Findings: Nonfunctioning battery InterStim with InterStim lead in wrong position and not in sacral orifice    Complications: None    Indications: Same    Description of Procedure: Please see prior dictation the lead and battery were replaced because the lead was not in the right location and the battery did not work it had exhausted its power.  I moved to a new site with the new battery and lead because of potential of infection in the old site.  See the prior operation description.    Rohan Doe MD     Date: 8/27/2023  Time: 09:45 CDT

## 2023-08-28 DIAGNOSIS — Z79.891 LONG TERM PRESCRIPTION OPIATE USE: Chronic | ICD-10-CM

## 2023-08-28 RX ORDER — FLURBIPROFEN SODIUM 0.3 MG/ML
SOLUTION/ DROPS OPHTHALMIC
Qty: 100 EACH | Refills: 0 | Status: SHIPPED | OUTPATIENT
Start: 2023-08-28

## 2023-08-29 ENCOUNTER — HOSPITAL ENCOUNTER (EMERGENCY)
Facility: HOSPITAL | Age: 63
Discharge: HOME | End: 2023-08-29
Attending: FAMILY MEDICINE | Admitting: FAMILY MEDICINE
Payer: COMMERCIAL

## 2023-08-29 ENCOUNTER — HOSPITAL ENCOUNTER (OUTPATIENT)
Dept: NUCLEAR MEDICINE | Facility: HOSPITAL | Age: 63
Discharge: HOME | End: 2023-08-29
Payer: COMMERCIAL

## 2023-08-29 VITALS
WEIGHT: 255 LBS | HEART RATE: 67 BPM | BODY MASS INDEX: 38.65 KG/M2 | SYSTOLIC BLOOD PRESSURE: 156 MMHG | RESPIRATION RATE: 16 BRPM | TEMPERATURE: 98.5 F | DIASTOLIC BLOOD PRESSURE: 71 MMHG | OXYGEN SATURATION: 96 % | HEIGHT: 68 IN

## 2023-08-29 DIAGNOSIS — E87.6 HYPOKALEMIA: ICD-10-CM

## 2023-08-29 DIAGNOSIS — R11.0 NAUSEA: ICD-10-CM

## 2023-08-29 DIAGNOSIS — E16.2 HYPOGLYCEMIA: Primary | ICD-10-CM

## 2023-08-29 LAB
ALBUMIN SERPL-MCNC: 4.1 G/DL (ref 3.5–5.2)
ALBUMIN/GLOB SERPL: 1.1 G/DL
ALP SERPL-CCNC: 125 U/L (ref 39–117)
ALT SERPL W P-5'-P-CCNC: 17 U/L (ref 1–33)
ANION GAP SERPL CALCULATED.3IONS-SCNC: 13 MMOL/L (ref 5–15)
ANISOCYTOSIS BLD QL: NORMAL
AST SERPL-CCNC: 17 U/L (ref 1–32)
BASOPHILS # BLD AUTO: 0.06 10*3/MM3 (ref 0–0.2)
BASOPHILS NFR BLD AUTO: 0.5 % (ref 0–1.5)
BILIRUB SERPL-MCNC: 0.3 MG/DL (ref 0–1.2)
BUN SERPL-MCNC: 20 MG/DL (ref 8–23)
BUN/CREAT SERPL: 16.9 (ref 7–25)
CALCIUM SPEC-SCNC: 9.6 MG/DL (ref 8.6–10.5)
CHLORIDE SERPL-SCNC: 104 MMOL/L (ref 98–107)
CO2 SERPL-SCNC: 25 MMOL/L (ref 22–29)
CREAT SERPL-MCNC: 1.18 MG/DL (ref 0.57–1)
DEPRECATED RDW RBC AUTO: 46.2 FL (ref 37–54)
EGFRCR SERPLBLD CKD-EPI 2021: 52.7 ML/MIN/1.73
EOSINOPHIL # BLD AUTO: 0.6 10*3/MM3 (ref 0–0.4)
EOSINOPHIL NFR BLD AUTO: 4.6 % (ref 0.3–6.2)
ERYTHROCYTE [DISTWIDTH] IN BLOOD BY AUTOMATED COUNT: 15.2 % (ref 12.3–15.4)
FLUAV RNA RESP QL NAA+PROBE: NOT DETECTED
FLUBV RNA RESP QL NAA+PROBE: NOT DETECTED
GLOBULIN UR ELPH-MCNC: 3.9 GM/DL
GLUCOSE BLDC GLUCOMTR-MCNC: 42 MG/DL (ref 70–130)
GLUCOSE BLDC GLUCOMTR-MCNC: 44 MG/DL (ref 70–130)
GLUCOSE BLDC GLUCOMTR-MCNC: 64 MG/DL (ref 70–130)
GLUCOSE BLDC GLUCOMTR-MCNC: 99 MG/DL (ref 70–130)
GLUCOSE SERPL-MCNC: 58 MG/DL (ref 65–99)
HCT VFR BLD AUTO: 43.8 % (ref 34–46.6)
HGB BLD-MCNC: 14.3 G/DL (ref 12–15.9)
HOLD SPECIMEN: NORMAL
IMM GRANULOCYTES # BLD AUTO: 0.06 10*3/MM3 (ref 0–0.05)
IMM GRANULOCYTES NFR BLD AUTO: 0.5 % (ref 0–0.5)
LYMPHOCYTES # BLD AUTO: 2.57 10*3/MM3 (ref 0.7–3.1)
LYMPHOCYTES NFR BLD AUTO: 19.7 % (ref 19.6–45.3)
MCH RBC QN AUTO: 27.5 PG (ref 26.6–33)
MCHC RBC AUTO-ENTMCNC: 32.6 G/DL (ref 31.5–35.7)
MCV RBC AUTO: 84.2 FL (ref 79–97)
MONOCYTES # BLD AUTO: 1.1 10*3/MM3 (ref 0.1–0.9)
MONOCYTES NFR BLD AUTO: 8.4 % (ref 5–12)
NEUTROPHILS NFR BLD AUTO: 66.3 % (ref 42.7–76)
NEUTROPHILS NFR BLD AUTO: 8.64 10*3/MM3 (ref 1.7–7)
NRBC BLD AUTO-RTO: 0 /100 WBC (ref 0–0.2)
OVALOCYTES BLD QL SMEAR: NORMAL
PLATELET # BLD AUTO: 376 10*3/MM3 (ref 140–450)
PMV BLD AUTO: 9.6 FL (ref 6–12)
POTASSIUM SERPL-SCNC: 3.3 MMOL/L (ref 3.5–5.2)
PROT SERPL-MCNC: 8 G/DL (ref 6–8.5)
RBC # BLD AUTO: 5.2 10*6/MM3 (ref 3.77–5.28)
SARS-COV-2 RNA RESP QL NAA+PROBE: NOT DETECTED
SMALL PLATELETS BLD QL SMEAR: ADEQUATE
SODIUM SERPL-SCNC: 142 MMOL/L (ref 136–145)
WBC MORPH BLD: NORMAL
WBC NRBC COR # BLD: 13.03 10*3/MM3 (ref 3.4–10.8)
WHOLE BLOOD HOLD COAG: NORMAL

## 2023-08-29 PROCEDURE — 85007 BL SMEAR W/DIFF WBC COUNT: CPT | Performed by: FAMILY MEDICINE

## 2023-08-29 PROCEDURE — A9541 TC99M SULFUR COLLOID: HCPCS | Performed by: INTERNAL MEDICINE

## 2023-08-29 PROCEDURE — 85025 COMPLETE CBC W/AUTO DIFF WBC: CPT | Performed by: FAMILY MEDICINE

## 2023-08-29 PROCEDURE — 0 TECHNETIUM SULFUR COLLOID: Performed by: INTERNAL MEDICINE

## 2023-08-29 PROCEDURE — 78264 GASTRIC EMPTYING IMG STUDY: CPT

## 2023-08-29 PROCEDURE — 80053 COMPREHEN METABOLIC PANEL: CPT | Performed by: FAMILY MEDICINE

## 2023-08-29 PROCEDURE — 82948 REAGENT STRIP/BLOOD GLUCOSE: CPT

## 2023-08-29 PROCEDURE — 87636 SARSCOV2 & INF A&B AMP PRB: CPT | Performed by: FAMILY MEDICINE

## 2023-08-29 PROCEDURE — 36415 COLL VENOUS BLD VENIPUNCTURE: CPT

## 2023-08-29 PROCEDURE — 99282 EMERGENCY DEPT VISIT SF MDM: CPT

## 2023-08-29 RX ORDER — SODIUM CHLORIDE 0.9 % (FLUSH) 0.9 %
10 SYRINGE (ML) INJECTION AS NEEDED
Status: DISCONTINUED | OUTPATIENT
Start: 2023-08-29 | End: 2023-08-29 | Stop reason: HOSPADM

## 2023-08-29 RX ORDER — GABAPENTIN 100 MG/1
200 CAPSULE ORAL EVERY 12 HOURS
Qty: 120 CAPSULE | Refills: 1 | Status: SHIPPED | OUTPATIENT
Start: 2023-08-29

## 2023-08-29 RX ORDER — POTASSIUM CHLORIDE 750 MG/1
10 TABLET, FILM COATED, EXTENDED RELEASE ORAL 2 TIMES DAILY
Qty: 20 TABLET | Refills: 0 | Status: SHIPPED | OUTPATIENT
Start: 2023-08-29

## 2023-08-29 RX ADMIN — TECHNETIUM TC 99M SULFUR COLLOID 1 DOSE: KIT at 08:50

## 2023-08-29 NOTE — ED NOTES
[facility] Medicine called to report sending patient to ED for low blood sugar that is not responding to oral intake.  NM reports initial FSBS 40 then 42.

## 2023-08-29 NOTE — ED PROVIDER NOTES
Subjective   History of Present Illness  Patient presents to the emergency department hyperglycemia.  She takes a long-acting insulin and does not take any oral medications for glycemic control.  She was scheduled to have a nuclear test done today for evaluation of possible gastroparesis and has not had anything to eat since yesterday.  She is noted to have a glucose of 44 this morning and was sent to the emergency department for further assessment.  She took all of her home medications this morning.      Hypoglycemia  Severity:  Moderate  Duration:  1 day  Timing:  Intermittent  Progression:  Improving  Chronicity:  New  Diabetic status:  Controlled with insulin  Associated symptoms: weakness    Associated symptoms: no dizziness, no seizures, no shortness of breath, no sweats and no vomiting      Review of Systems   Constitutional:  Positive for fatigue. Negative for appetite change, chills, diaphoresis and fever.   HENT:  Negative for congestion, ear discharge, ear pain, nosebleeds, rhinorrhea, sinus pressure, sore throat and trouble swallowing.    Eyes:  Negative for discharge and redness.   Respiratory:  Positive for cough. Negative for apnea, chest tightness, shortness of breath and wheezing.    Cardiovascular:  Negative for chest pain.   Gastrointestinal:  Positive for nausea. Negative for abdominal pain, diarrhea and vomiting.   Endocrine: Negative for polyuria.   Genitourinary:  Negative for dysuria, frequency and urgency.   Musculoskeletal:  Negative for myalgias and neck pain.   Skin:  Negative for color change and rash.   Allergic/Immunologic: Negative for immunocompromised state.   Neurological:  Positive for weakness. Negative for dizziness, seizures, syncope, light-headedness and headaches.   Hematological:  Negative for adenopathy. Does not bruise/bleed easily.   Psychiatric/Behavioral:  Negative for behavioral problems and confusion.    All other systems reviewed and are negative.    Past Medical  History:   Diagnosis Date    Acute bacterial endocarditis 03/13/2021    Angina, class IV     Anxiety     Anxiety and depression     Arthritis     Benign paroxysmal positional vertigo     Bladder disorder     has bladder stimulator    Carpal tunnel syndrome     CHF (congestive heart failure)     Chronic pain     Coronary atherosclerosis     hx CABG 2005.  is followed by Dr Kwon    Depression     Diabetes mellitus     Type 2, controlled    Diabetic polyneuropathy     Disease of thyroid gland     Elevated cholesterol     Female stress incontinence     Foot pain, left     Full dentures     Gastroparesis     GERD (gastroesophageal reflux disease)     Hyperlipidemia     Hypertension     Low back pain     Malaise and fatigue     Meniere disease     Multiple joint pain     Obesity     Refuses to be weighed    Occlusion and stenosis of bilateral carotid arteries 06/18/2021    Otalgia     Both    Perforation of tympanic membrane     Left    Postoperative wound infection     Vitamin D deficiency     Wears glasses     reading       Allergies   Allergen Reactions    Seroquel [Quetiapine] Anaphylaxis    Avandia [Rosiglitazone] Swelling    Morphine And Related Hallucinations    Oxycodone Hallucinations       Past Surgical History:   Procedure Laterality Date    ABDOMINAL SURGERY      AMPUTATION FOOT      ANGIOPLASTY      coronary    ANKLE ARTHROSCOPY Left 02/26/2021    Procedure: Left foot hardware removal, ankle arthroscopy, bone grafting , left foot exostectomy;  Surgeon: Ignacio Lord DPM;  Location: [facility] OR;  Service: Podiatry;  Laterality: Left;    BREAST BIOPSY Right     CARDIAC CATHETERIZATION      CARDIAC CATHETERIZATION N/A 06/20/2017    Procedure: Right Heart Cath;  Surgeon: Can Kwon MD PhD;  Location: [facility] CATH INVASIVE LOCATION;  Service:     CARDIAC CATHETERIZATION N/A 02/18/2020    Procedure: Left Heart Cath;  Surgeon: Catalina Cooper MD;   Location: [facility] CATH INVASIVE LOCATION;  Service: Cardiology;  Laterality: N/A;    CARDIAC CATHETERIZATION N/A 04/06/2022    Procedure: Left Heart Cath;  Surgeon: Sheryl Navas MD;  Location: [facility] CATH INVASIVE LOCATION;  Service: Cardiology;  Laterality: N/A;    CARPAL TUNNEL RELEASE      CHOLECYSTECTOMY      COLONOSCOPY N/A 06/24/2020    Procedure: COLONOSCOPY;  Surgeon: Julián Maldonado MD;  Location: [facility] ENDOSCOPY;  Service: Gastroenterology;  Laterality: N/A;    CORONARY ARTERY BYPASS GRAFT  2005    CYSTOSCOPY N/A 5/27/2023    Procedure: FLEXIBLE CYSTOSCOPY;  Surgeon: Rohan Doe MD;  Location: [facility] OR;  Service: Urology;  Laterality: N/A;    ENDOSCOPY N/A 10/19/2018    Procedure: ESOPHAGOGASTRODUODENOSCOPY possible dilation;  Surgeon: Julián Maldonado MD;  Location: [facility] ENDOSCOPY;  Service: Gastroenterology    ENDOSCOPY N/A 06/24/2020    Procedure: ESOPHAGOGASTRODUODENOSCOPY WED appt please;  Surgeon: Julián Maldonado MD;  Location: [facility] ENDOSCOPY;  Service: Gastroenterology;  Laterality: N/A;    ENDOSCOPY N/A 06/10/2022    Procedure: ESOPHAGOGASTRODUODENOSCOPY   room 380;  Surgeon: Jeremiah Wilkins MD;  Location: [facility] ENDOSCOPY;  Service: Gastroenterology;  Laterality: N/A;    ENDOSCOPY N/A 1/10/2023    Procedure: ESOPHAGOGASTRODUODENOSCOPY;  Surgeon: Jeremiah Wilkins MD;  Location: [facility] ENDOSCOPY;  Service: Gastroenterology;  Laterality: N/A;    ENDOSCOPY AND COLONOSCOPY      FOOT SURGERY      Toes    FOOT SURGERY      GASTRIC BANDING      Revision, laparoscopic    HYSTERECTOMY      INCISION AND DRAINAGE LEG Left 03/12/2021    Procedure: Left ankle arthroscopic irrigation and debridement, screw removal;  Surgeon: Ignacio Lord DPM;  Location: [facility] OR;  Service: Podiatry;  Laterality: Left;    INTERSTIM PLACEMENT N/A 8/16/2023    Procedure: INTERSTIM STAGE TWO IMPLANTABLE GENERATOR PLACEMENT BATTERY CHANGE, PLUS STAGE ONE LEAD PLACEMENT;  Surgeon: Nader  Rohan VERMA MD;  Location: [facility];  Service: Urology;  Laterality: N/A;    MOUTH SURGERY      SALPINGO OOPHORECTOMY      SHOULDER SURGERY      SUBTALAR ARTHRODESIS Left 01/16/2019    Procedure: LEFT FOOT HARDWARE REMOVAL, FIFTH METATARSAL , OPEN REDUCTION INTERNAL FIXATION, CALCANEAL OSTEOTOMY;  Surgeon: Ignacio Lord DPM;  Location: [facility] OR;  Service: Podiatry    SUBTALAR ARTHRODESIS Left 10/16/2019    Procedure: foot hardware removal, subtalar joint fusion  possible de/reattachment of achilles tendon        (c-arm);  Surgeon: Ignacio Lord DPM;  Location: [facility] OR;  Service: Podiatry    SUBTALAR ARTHRODESIS Left 09/30/2020    Procedure: subtalar, talonavicular joint arthrodesis.  Removal hardware.          (c-arm);  Surgeon: Ignacio Lord DPM;  Location: [facility];  Service: Podiatry;  Laterality: Left;    TRANSESOPHAGEAL ECHOCARDIOGRAM (LAMONTE)      With color flow       Family History   Problem Relation Age of Onset    Diabetes Other     Heart disease Other     Hypertension Other     Heart disease Mother     Stroke Mother     Hypertension Mother     Diabetes Sister     Heart disease Sister     Hypertension Sister     Heart disease Sister     Diabetes Sister     Hypertension Sister     Diabetes Sister     Diabetes Sister     Diabetes Sister     Diabetes Sister        Social History     Socioeconomic History    Marital status:    Tobacco Use    Smoking status: Never     Passive exposure: Never    Smokeless tobacco: Never   Vaping Use    Vaping Use: Never used   Substance and Sexual Activity    Alcohol use: No    Drug use: No    Sexual activity: Defer           Objective   Physical Exam  Vitals and nursing note reviewed.   Constitutional:       Appearance: She is well-developed.   HENT:      Head: Normocephalic and atraumatic.      Nose: Nose normal.   Eyes:      General: No scleral icterus.        Right eye: No discharge.         Left eye: No discharge.       Conjunctiva/sclera: Conjunctivae normal.      Pupils: Pupils are equal, round, and reactive to light.   Neck:      Trachea: No tracheal deviation.   Cardiovascular:      Rate and Rhythm: Normal rate and regular rhythm.      Heart sounds: Normal heart sounds. No murmur heard.  Pulmonary:      Effort: Pulmonary effort is normal. No respiratory distress.      Breath sounds: Normal breath sounds. No stridor. No wheezing or rales.   Abdominal:      General: Bowel sounds are normal. There is no distension.      Palpations: Abdomen is soft. There is no mass.      Tenderness: There is no abdominal tenderness. There is no guarding or rebound.   Musculoskeletal:      Cervical back: Normal range of motion and neck supple.      Comments: Left BKA, right leg with compression wrap   Skin:     General: Skin is warm and dry.      Findings: No erythema or rash.   Neurological:      Mental Status: She is alert and oriented to person, place, and time.      Coordination: Coordination normal.   Psychiatric:         Behavior: Behavior normal.         Thought Content: Thought content normal.       Procedures           ED Course  ED Course as of 08/29/23 1424   Tue Aug 29, 2023   1422 Glucose has continued to rise with oral intake.  Labs discussed with patient and  at bedside and advised follow-up with primary care.  She was advised that the next time she needs to fast for a study, she may want to consider holding her long acting dose of insulin. [CB]      ED Course User Index  [CB] Jordy Godfrey MD                                           Medical Decision Making  Problems Addressed:  Hypoglycemia: complicated acute illness or injury  Hypokalemia: complicated acute illness or injury    Amount and/or Complexity of Data Reviewed  Labs: ordered.    Risk  Prescription drug management.        Final diagnoses:   Hypoglycemia   Hypokalemia       ED Disposition  ED Disposition       ED Disposition   Discharge    Condition   Stable     Comment   --               Dolly Foss, APRN  [address] [place] [address]  [phone]    In 2 days           Medication List        New Prescriptions      potassium chloride 10 MEQ CR tablet  Take 1 tablet by mouth 2 (Two) Times a Day.            Changed      BASAGLAR KWIKPEN 100 UNIT/ML injection pen  Inject 40 units into the appropriate area every morning and 35 units into the appropriate area every night  What changed:   how much to take  when to take this     butalbital-acetaminophen-caffeine -40 MG per tablet  Commonly known as: FIORICET, ESGIC  Take one to two tablets by mouth per headache episode as needed.  What changed:   how much to take  how to take this  when to take this     fluticasone 50 MCG/ACT nasal spray  Commonly known as: FLONASE  INSTILL 2 SPRAYS IN EACH NOSTRIL AS DIRECTED BY PROVIDER DAILY  What changed: See the new instructions.     levothyroxine 50 MCG tablet  Commonly known as: Synthroid  Take 1 tablet by mouth Daily.  What changed: when to take this     meloxicam 15 MG tablet  Commonly known as: MOBIC  TAKE 1 TABLET BY MOUTH EVERY DAY WITH FOOD  What changed:   when to take this  reasons to take this  additional instructions     methocarbamol 500 MG tablet  Commonly known as: ROBAXIN  TAKE 1 TABLET BY MOUTH 2 TIMES A DAY AS NEEDED FOR MUSCLE SPASMS.  What changed: reasons to take this     ranolazine 500 MG 12 hr tablet  Commonly known as: RANEXA  TAKE 1 TABLET BY MOUTH EVERY 12 HOURS  What changed: when to take this     sucralfate 1 g tablet  Commonly known as: CARAFATE  TAKE 1 TABLET BY MOUTH FOUR TIMES DAILY  What changed:   how much to take  when to take this  reasons to take this     vitamin D 1.25 MG (26509 UT) capsule capsule  Commonly known as: ERGOCALCIFEROL  TAKE 1 CAPSULE BY MOUTH EVERY 7 DAYS.  What changed: additional instructions               Where to Get Your Medications        These medications were sent to [place], [place]  - [address] - [phone] PH - [phone] FX  [address], [facility] [address]      Phone: [phone]   potassium chloride 10 MEQ CR tablet            Jordy Godfrey MD  08/29/23 142       Jordy Godfrey MD  08/29/23 1421

## 2023-08-29 NOTE — ED NOTES
Nuclear Med staff had provided patient with sprite and cracker without successful elevation in blood sugar.  FSBS 44.  Patient provided two small containers (8 ounce total) of orange juice with 4 (four) packets of sugar.  Patient drank and tolerated well.

## 2023-08-29 NOTE — NURSING NOTE
Mrs. Martinez was in radiology for nuclear test. Patient experiencing signs/symptoms of low blood sugar per nuclear tech. Blood sugar was taken with a result of 40. Patient was given peanut butter crackers and a sprite. Blood sugar was rechecked with a result of 42. Patient was advised to go to ER for further treatment. Patient was taken to ED room 20 with  at side.

## 2023-08-31 ENCOUNTER — OFFICE VISIT (OUTPATIENT)
Dept: GASTROENTEROLOGY | Facility: CLINIC | Age: 63
End: 2023-08-31
Payer: COMMERCIAL

## 2023-08-31 ENCOUNTER — OFFICE VISIT (OUTPATIENT)
Dept: PODIATRY | Facility: CLINIC | Age: 63
End: 2023-08-31
Payer: COMMERCIAL

## 2023-08-31 VITALS
BODY MASS INDEX: 38.65 KG/M2 | DIASTOLIC BLOOD PRESSURE: 74 MMHG | SYSTOLIC BLOOD PRESSURE: 121 MMHG | HEIGHT: 68 IN | HEART RATE: 69 BPM | WEIGHT: 255 LBS

## 2023-08-31 VITALS
SYSTOLIC BLOOD PRESSURE: 134 MMHG | HEIGHT: 68 IN | BODY MASS INDEX: 38.65 KG/M2 | WEIGHT: 255 LBS | DIASTOLIC BLOOD PRESSURE: 60 MMHG | HEART RATE: 70 BPM | OXYGEN SATURATION: 98 %

## 2023-08-31 DIAGNOSIS — E11.43 DIABETIC GASTROPARESIS: Chronic | ICD-10-CM

## 2023-08-31 DIAGNOSIS — K31.84 DIABETIC GASTROPARESIS: Chronic | ICD-10-CM

## 2023-08-31 DIAGNOSIS — M79.671 RIGHT FOOT PAIN: ICD-10-CM

## 2023-08-31 DIAGNOSIS — L03.115 CELLULITIS AND ABSCESS OF RIGHT LOWER EXTREMITY: Primary | ICD-10-CM

## 2023-08-31 DIAGNOSIS — L02.415 CELLULITIS AND ABSCESS OF RIGHT LOWER EXTREMITY: Primary | ICD-10-CM

## 2023-08-31 DIAGNOSIS — R11.0 CHRONIC NAUSEA: Chronic | ICD-10-CM

## 2023-08-31 PROCEDURE — 99213 OFFICE O/P EST LOW 20 MIN: CPT | Performed by: NURSE PRACTITIONER

## 2023-08-31 PROCEDURE — 99214 OFFICE O/P EST MOD 30 MIN: CPT | Performed by: INTERNAL MEDICINE

## 2023-08-31 RX ORDER — METOCLOPRAMIDE 10 MG/1
10 TABLET ORAL 4 TIMES DAILY PRN
Qty: 120 TABLET | Refills: 0 | Status: SHIPPED | OUTPATIENT
Start: 2023-08-31

## 2023-08-31 NOTE — PROGRESS NOTES
Ariana Martinez  1960  63 y.o. female  PCP: Dolly Foss: 08/18/2023  BS: 149 per pt     08/31/2023    Chief Complaint   Patient presents with    Right Foot - Follow-up       History of Present Illness    Ariana Martinez is a 63 y.o.female Patient presents to clinic for a follow up on right foot pain and swelling. Patient removed her unna boot yesterday.   She reports that her pain has not improved she continues to have moderate amount of swelling and complaints of pain with weightbearing.  She denies any fever chills or evidence of infection at this time.        Past Medical History:   Diagnosis Date    Acute bacterial endocarditis 03/13/2021    Angina, class IV     Anxiety     Anxiety and depression     Arthritis     Benign paroxysmal positional vertigo     Bladder disorder     has bladder stimulator    Carpal tunnel syndrome     CHF (congestive heart failure)     Chronic pain     Coronary atherosclerosis     hx CABG 2005.  is followed by Dr Cher Amaya     Diabetes mellitus     Type 2, controlled    Diabetic polyneuropathy     Disease of thyroid gland     Elevated cholesterol     Female stress incontinence     Foot pain, left     Full dentures     Gastroparesis     GERD (gastroesophageal reflux disease)     Hyperlipidemia     Hypertension     Low back pain     Malaise and fatigue     Meniere disease     Multiple joint pain     Obesity     Refuses to be weighed    Occlusion and stenosis of bilateral carotid arteries 06/18/2021    Otalgia     Both    Perforation of tympanic membrane     Left    Postoperative wound infection     Vitamin D deficiency     Wears glasses     reading         Past Surgical History:   Procedure Laterality Date    ABDOMINAL SURGERY      AMPUTATION FOOT      ANGIOPLASTY      coronary    ANKLE ARTHROSCOPY Left 02/26/2021    Procedure: Left foot hardware removal, ankle arthroscopy, bone grafting , left foot exostectomy;  Surgeon: Ignacio Lord DPM;  Location: [facility] OR;   Service: Podiatry;  Laterality: Left;    BREAST BIOPSY Right     CARDIAC CATHETERIZATION      CARDIAC CATHETERIZATION N/A 06/20/2017    Procedure: Right Heart Cath;  Surgeon: Can Kwon MD PhD;  Location: [facility] CATH INVASIVE LOCATION;  Service:     CARDIAC CATHETERIZATION N/A 02/18/2020    Procedure: Left Heart Cath;  Surgeon: Catalina Cooper MD;  Location: [facility] CATH INVASIVE LOCATION;  Service: Cardiology;  Laterality: N/A;    CARDIAC CATHETERIZATION N/A 04/06/2022    Procedure: Left Heart Cath;  Surgeon: Sheryl Navas MD;  Location: [facility] CATH INVASIVE LOCATION;  Service: Cardiology;  Laterality: N/A;    CARPAL TUNNEL RELEASE      CHOLECYSTECTOMY      COLONOSCOPY N/A 06/24/2020    Procedure: COLONOSCOPY;  Surgeon: Julián Maldonado MD;  Location: [facility] ENDOSCOPY;  Service: Gastroenterology;  Laterality: N/A;    CORONARY ARTERY BYPASS GRAFT  2005    CYSTOSCOPY N/A 5/27/2023    Procedure: FLEXIBLE CYSTOSCOPY;  Surgeon: Rohan Doe MD;  Location: [facility] OR;  Service: Urology;  Laterality: N/A;    ENDOSCOPY N/A 10/19/2018    Procedure: ESOPHAGOGASTRODUODENOSCOPY possible dilation;  Surgeon: Julián Maldonado MD;  Location: [facility] ENDOSCOPY;  Service: Gastroenterology    ENDOSCOPY N/A 06/24/2020    Procedure: ESOPHAGOGASTRODUODENOSCOPY WED appt please;  Surgeon: Julián Maldonado MD;  Location: [facility] ENDOSCOPY;  Service: Gastroenterology;  Laterality: N/A;    ENDOSCOPY N/A 06/10/2022    Procedure: ESOPHAGOGASTRODUODENOSCOPY   room 380;  Surgeon: Jeremiah Wilkins MD;  Location: [facility] ENDOSCOPY;  Service: Gastroenterology;  Laterality: N/A;    ENDOSCOPY N/A 1/10/2023    Procedure: ESOPHAGOGASTRODUODENOSCOPY;  Surgeon: Jeremiah Wilkins MD;  Location: [facility] ENDOSCOPY;  Service: Gastroenterology;  Laterality: N/A;    ENDOSCOPY AND COLONOSCOPY      FOOT SURGERY      Toes    FOOT SURGERY      GASTRIC BANDING      Revision, laparoscopic    HYSTERECTOMY      INCISION AND DRAINAGE LEG Left  03/12/2021    Procedure: Left ankle arthroscopic irrigation and debridement, screw removal;  Surgeon: Ignacio Lord DPM;  Location: [facility];  Service: Podiatry;  Laterality: Left;    INTERSTIM PLACEMENT N/A 8/16/2023    Procedure: INTERSTIM STAGE TWO IMPLANTABLE GENERATOR PLACEMENT BATTERY CHANGE, PLUS STAGE ONE LEAD PLACEMENT;  Surgeon: Rohan Doe MD;  Location: [facility];  Service: Urology;  Laterality: N/A;    MOUTH SURGERY      SALPINGO OOPHORECTOMY      SHOULDER SURGERY      SUBTALAR ARTHRODESIS Left 01/16/2019    Procedure: LEFT FOOT HARDWARE REMOVAL, FIFTH METATARSAL , OPEN REDUCTION INTERNAL FIXATION, CALCANEAL OSTEOTOMY;  Surgeon: Ignacio Lord DPM;  Location: [facility] OR;  Service: Podiatry    SUBTALAR ARTHRODESIS Left 10/16/2019    Procedure: foot hardware removal, subtalar joint fusion  possible de/reattachment of achilles tendon        (c-arm);  Surgeon: Ignacio Lord DPM;  Location: [facility];  Service: Podiatry    SUBTALAR ARTHRODESIS Left 09/30/2020    Procedure: subtalar, talonavicular joint arthrodesis.  Removal hardware.          (c-arm);  Surgeon: Ignacio Lord DPM;  Location: [facility];  Service: Podiatry;  Laterality: Left;    TRANSESOPHAGEAL ECHOCARDIOGRAM (LAMONTE)      With color flow         Family History   Problem Relation Age of Onset    Diabetes Other     Heart disease Other     Hypertension Other     Heart disease Mother     Stroke Mother     Hypertension Mother     Diabetes Sister     Heart disease Sister     Hypertension Sister     Heart disease Sister     Diabetes Sister     Hypertension Sister     Diabetes Sister     Diabetes Sister     Diabetes Sister     Diabetes Sister        Allergies   Allergen Reactions    Seroquel [Quetiapine] Anaphylaxis    Avandia [Rosiglitazone] Swelling    Morphine And Related Hallucinations    Oxycodone Hallucinations       Social History     Socioeconomic History    Marital status:    Tobacco Use    Smoking status:  Never     Passive exposure: Never    Smokeless tobacco: Never   Vaping Use    Vaping Use: Never used   Substance and Sexual Activity    Alcohol use: No    Drug use: No    Sexual activity: Defer         Current Outpatient Medications   Medication Sig Dispense Refill    amLODIPine (NORVASC) 5 MG tablet Take 1 tablet by mouth Daily. 90 tablet 1    ARIPiprazole (ABILIFY) 5 MG tablet TAKE 1 TABLET BY MOUTH EVERY DAY (Patient taking differently: Take 1 tablet by mouth Daily.) 30 tablet 0    aspirin  MG tablet Take 1 tablet by mouth Daily. 30 tablet 0    atorvastatin (LIPITOR) 80 MG tablet Take 1 tablet by mouth Daily. 90 tablet 1    B-D UF III MINI PEN NEEDLES 31G X 5 MM misc USE FOUR TIMES DAILY FOR INSULIN 100 each 0    B-D ULTRAFINE III SHORT PEN 31G X 8 MM misc USE TO INJECT 5 TIMES A  each 11    B-D ULTRAFINE III SHORT PEN 31G X 8 MM misc USE UP TO 4 (FOUR) TIMES DAILY WITH INSULIN AS DIRECTED. 200 each 1    BD SHARPS CONTAINER HOME misc 1 each Take As Directed. 1 each 0    Blood Glucose Monitoring Suppl (ONE TOUCH ULTRA MINI) w/Device kit Patient will need the Accu-Check Avitio meter 1 each 0    butalbital-acetaminophen-caffeine (FIORICET, ESGIC) -40 MG per tablet Take one to two tablets by mouth per headache episode as needed. (Patient taking differently: Take 2 tablets by mouth Take As Directed. Take one to two tablets by mouth per headache episode as needed.) 6 tablet 0    Calcium Citrate-Vitamin D 250-200 MG-UNIT tablet Take 2 tablets by mouth 2 (two) times a day.      clopidogrel (PLAVIX) 75 MG tablet TAKE 1 TABLET BY MOUTH EVERY DAY (Patient taking differently: Take 1 tablet by mouth Daily.) 90 tablet 0    cyanocobalamin 1000 MCG/ML injection Inject 1 mL into the appropriate muscle as directed by prescriber Every 28 (Twenty-Eight) Days. 3 mL 0    dicyclomine (BENTYL) 20 MG tablet Take 1 tablet by mouth Every 6 (Six) Hours.      doxycycline (VIBRAMYCIN) 100 MG capsule Take 1 capsule by mouth  2 (Two) Times a Day. 20 capsule 0    famotidine (PEPCID) 40 MG tablet Take 1 tablet by mouth Daily.      fluticasone (FLONASE) 50 MCG/ACT nasal spray INSTILL 2 SPRAYS IN EACH NOSTRIL AS DIRECTED BY PROVIDER DAILY (Patient taking differently: As Needed.) 16 mL 5    folic acid (FOLVITE) 1 MG tablet TAKE 1 TABLET BY MOUTH EVERY DAY (Patient taking differently: Take 1 tablet by mouth Daily.) 90 tablet 1    furosemide (LASIX) 40 MG tablet TAKE 1 TABLET BY MOUTH EVERY DAY (Patient taking differently: Take 1 tablet by mouth Daily.) 30 tablet 5    gabapentin (NEURONTIN) 100 MG capsule Take 2 capsules by mouth Every 12 (Twelve) Hours. 120 capsule 1    glucose blood (Accu-Chek Guide) test strip 1 each by Other route 4 (Four) Times a Day. To test blood sugar 4X daily. For  Dx E11.65 400 each 1    glucose monitor monitoring kit 1 each Daily. accucheck eve meter, E11.9 1 each 0    hydroCHLOROthiazide (HYDRODIURIL) 12.5 MG tablet Take 1 tablet by mouth Daily. 90 tablet 0    HYDROcodone-acetaminophen (NORCO) 7.5-325 MG per tablet Take 1 tablet by mouth Every 6 (Six) Hours As Needed for Moderate Pain. 120 tablet 0    Insulin Aspart (novoLOG) 100 UNIT/ML injection Inject 0-7 Units under the skin into the appropriate area as directed 3 (Three) Times a Day Before Meals. 30 mL 12    Insulin Glargine (BASAGLAR KWIKPEN) 100 UNIT/ML injection pen Inject 40 units into the appropriate area every morning and 35 units into the appropriate area every night (Patient taking differently: 45 Units 2 (Two) Times a Day. Inject 40 units into the appropriate area every morning and 35 units into the appropriate area every night) 30 mL 4    isosorbide mononitrate (IMDUR) 30 MG 24 hr tablet TAKE 1 TABLET BY MOUTH EVERY DAY (Patient taking differently: Take 1 tablet by mouth Daily.) 90 tablet 0    Lancets (accu-chek soft touch) lancets As directed 100 each 12    levothyroxine (Synthroid) 50 MCG tablet Take 1 tablet by mouth Daily. (Patient taking  "differently: Take 1 tablet by mouth Every Night.) 30 tablet 11    meclizine (ANTIVERT) 25 MG tablet Take 1 tablet by mouth 3 (Three) Times a Day As Needed for dizziness. 90 tablet 6    meloxicam (MOBIC) 15 MG tablet TAKE 1 TABLET BY MOUTH EVERY DAY WITH FOOD (Patient taking differently: Take 1 tablet by mouth Daily As Needed.) 30 tablet 3    methocarbamol (ROBAXIN) 500 MG tablet TAKE 1 TABLET BY MOUTH 2 TIMES A DAY AS NEEDED FOR MUSCLE SPASMS. (Patient taking differently: Take 1 tablet by mouth 2 (Two) Times a Day As Needed.) 60 tablet 5    metoclopramide (REGLAN) 10 MG tablet TAKE 1 TABLET BY MOUTH 4 (FOUR) TIMES A DAY AS NEEDED (NAUSEA). 120 tablet 0    metoprolol succinate XL (TOPROL-XL) 50 MG 24 hr tablet TAKE 1 TABLET BY MOUTH EVERY DAY (Patient taking differently: Take 1 tablet by mouth Daily.) 90 tablet 1    naloxone (NARCAN) 0.4 MG/ML injection Infuse 0.5 mL into a venous catheter Every 5 (Five) Minutes As Needed for Opioid Reversal.      Needle, Disp, (Easy Touch FlipLock Needles) 25G X 1-1/2\" misc 1 each Every 28 (Twenty-Eight) Days. For administering B12 cyanocobalomin. Dx vitamin B12 deficiency. 10 each 3    Needle, Disp, (Hypodermic Needle) 20G X 1\" misc 1 each Every 28 (Twenty-Eight) Days. For drawing up B12 for injection monthly, change back to smaller gauge needle prior to injection. Dx Vitamin B12  Deficiency. 10 each 2    nitroglycerin (NITROSTAT) 0.4 MG SL tablet Place 1 tablet under the tongue Every 5 (Five) Minutes As Needed for Chest Pain. Take no more than 3 doses in 15 minutes. 20 tablet 11    ondansetron (ZOFRAN) 8 MG tablet Take 1 tablet by mouth Every 8 (Eight) Hours As Needed for Nausea or Vomiting. 18 tablet 0    pantoprazole (PROTONIX) 20 MG EC tablet Take 2 tablets by mouth Daily. 90 tablet 0    potassium chloride 10 MEQ CR tablet Take 1 tablet by mouth 2 (Two) Times a Day. 20 tablet 0    ranolazine (RANEXA) 500 MG 12 hr tablet TAKE 1 TABLET BY MOUTH EVERY 12 HOURS (Patient taking " "differently: Take 1 tablet by mouth 2 (Two) Times a Day.) 180 tablet 3    sucralfate (CARAFATE) 1 g tablet TAKE 1 TABLET BY MOUTH FOUR TIMES DAILY (Patient taking differently: Take  by mouth 4 (Four) Times a Day As Needed.) 120 tablet 0    Syringe 20G X 1-1/2\" 3 ML misc 1 each Every 28 (Twenty-Eight) Days. For injection cyanocobalomin, Dx vit B12 deficiency. 10 each 2    venlafaxine XR (EFFEXOR-XR) 75 MG 24 hr capsule Take 1 capsule by mouth Daily With Breakfast. 90 capsule 1    vitamin D (ERGOCALCIFEROL) 1.25 MG (77448 UT) capsule capsule TAKE 1 CAPSULE BY MOUTH EVERY 7 DAYS. (Patient taking differently: Take 1 capsule by mouth Every 7 (Seven) Days. TAKES EVERY SUNDAY) 36 capsule 0     No current facility-administered medications for this visit.       Review of Systems   Musculoskeletal:         Right ankle and leg pain   All other systems reviewed and are negative.      OBJECTIVE    /60   Pulse 70   Ht 172.7 cm (68\")   Wt 116 kg (255 lb)   SpO2 98%   BMI 38.77 kg/mý     Body mass index is 38.77 kg/mý.        Physical Exam  Vitals reviewed.   Constitutional:       Appearance: Normal appearance. She is well-developed.   HENT:      Head: Normocephalic and atraumatic.   Neck:      Trachea: Trachea and phonation normal.   Cardiovascular:      Pulses:           Dorsalis pedis pulses are 1+ on the right side.        Posterior tibial pulses are 1+ on the right side.   Pulmonary:      Effort: Pulmonary effort is normal. No respiratory distress.   Abdominal:      General: There is no distension.      Palpations: Abdomen is soft.   Musculoskeletal:      Right foot: Decreased range of motion.      Left Lower Extremity: (Below-knee amputation)  Feet:      Right foot:      Skin integrity: Erythema present.      Comments: Right leg-Global swelling of the right lower leg was scattered erythema extending from the foot up to mid calf.  There is no skin breakdown noted  Skin:     General: Skin is warm and dry. "   Neurological:      Mental Status: She is alert and oriented to person, place, and time.      GCS: GCS eye subscore is 4. GCS verbal subscore is 5. GCS motor subscore is 6.   Psychiatric:         Speech: Speech normal.         Behavior: Behavior normal. Behavior is cooperative.         Thought Content: Thought content normal.         Judgment: Judgment normal.              Procedures      ASSESSMENT AND PLAN    Diagnoses and all orders for this visit:    1. Cellulitis and abscess of right lower extremity (Primary)  -     CT FOOT RIGHT WITHOUT CONTRAST  -     CT ANKLE RIGHT WITHOUT CONTRAST  -     Doppler Ankle Brachial Index Single Level CAR  -     XR Tibia Fibula 2 View Right  -     NM Bone Scan 3 Phase    2. Right foot pain  -     CT FOOT RIGHT WITHOUT CONTRAST  -     CT ANKLE RIGHT WITHOUT CONTRAST  -     Doppler Ankle Brachial Index Single Level CAR  -     XR Tibia Fibula 2 View Right  -     NM Bone Scan 3 Phase      Body mass index is 38.77 kg/mý.    Recommend follow-up with primary care to discuss BMI greater than 30    No significant improvement in pain.  Will recommend proceeding with a CT scan of the foot and ankle to rule out an occult injury as well as a three-phase bone scan to rule out any evidence of osteomyelitis or Charcot foot.  In the meantime the patient will remain in a cam walker boot and modify her weightbearing based on her pain with use of a walker.  We will also obtain ABIs for appropriate circulatory status of the right lower extremity.  They were instructed to contact the office for worsening symptoms.        This document has been electronically signed by Panda SPENCE, FNP-C, ONP-C on August 31, 2023 09:25 CDT

## 2023-09-01 LAB — GLUCOSE BLDC GLUCOMTR-MCNC: 115 MG/DL (ref 70–130)

## 2023-09-06 ENCOUNTER — OFFICE VISIT (OUTPATIENT)
Dept: SURGERY | Facility: CLINIC | Age: 63
End: 2023-09-06
Payer: COMMERCIAL

## 2023-09-06 VITALS
WEIGHT: 260 LBS | TEMPERATURE: 97 F | HEART RATE: 66 BPM | HEIGHT: 68 IN | BODY MASS INDEX: 39.4 KG/M2 | SYSTOLIC BLOOD PRESSURE: 116 MMHG | DIASTOLIC BLOOD PRESSURE: 60 MMHG

## 2023-09-06 DIAGNOSIS — Z86.010 HX OF ADENOMATOUS COLONIC POLYPS: Primary | ICD-10-CM

## 2023-09-06 PROCEDURE — 99203 OFFICE O/P NEW LOW 30 MIN: CPT | Performed by: SURGERY

## 2023-09-06 RX ORDER — DEXTROSE AND SODIUM CHLORIDE 5; .45 G/100ML; G/100ML
100 INJECTION, SOLUTION INTRAVENOUS CONTINUOUS PRN
OUTPATIENT
Start: 2023-09-06

## 2023-09-06 NOTE — PROGRESS NOTES
Chief Complaint   Patient presents with    Consult    Colonoscopy     Consult for Colonoscopy       Ariana Martinez is a 63 y.o. female referred today for evaluation for colonoscopy.  She notes no change in bowel habits, no blood in the stool.  Patient's  is having a colonoscopy by us in a few weeks and she wanted us to do her recommended colonoscopy.  She had a colonoscopy done 3 years ago and was noted to have a tubular adenomatous polyp in the sigmoid and in the transverse colon.    Prior Colonoscopy:yes  Prior Polyps:yes  Family History of Colon Cancer:no  On anticoagulation:yes    Past Surgical History:   Procedure Laterality Date    ABDOMINAL SURGERY      AMPUTATION FOOT      ANGIOPLASTY      coronary    ANKLE ARTHROSCOPY Left 02/26/2021    Procedure: Left foot hardware removal, ankle arthroscopy, bone grafting , left foot exostectomy;  Surgeon: Ignacio Lord DPM;  Location: [facility] OR;  Service: Podiatry;  Laterality: Left;    BREAST BIOPSY Right     CARDIAC CATHETERIZATION      CARDIAC CATHETERIZATION N/A 06/20/2017    Procedure: Right Heart Cath;  Surgeon: Can Kwon MD PhD;  Location: [facility] CATH INVASIVE LOCATION;  Service:     CARDIAC CATHETERIZATION N/A 02/18/2020    Procedure: Left Heart Cath;  Surgeon: Catalina Cooper MD;  Location: [facility] CATH INVASIVE LOCATION;  Service: Cardiology;  Laterality: N/A;    CARDIAC CATHETERIZATION N/A 04/06/2022    Procedure: Left Heart Cath;  Surgeon: Sheryl Navas MD;  Location: [facility] CATH INVASIVE LOCATION;  Service: Cardiology;  Laterality: N/A;    CARPAL TUNNEL RELEASE      CHOLECYSTECTOMY      COLONOSCOPY N/A 06/24/2020    Procedure: COLONOSCOPY;  Surgeon: Julián Maldonado MD;  Location: [facility] ENDOSCOPY;  Service: Gastroenterology;  Laterality: N/A;    CORONARY ARTERY BYPASS GRAFT  2005    CYSTOSCOPY N/A 5/27/2023    Procedure: FLEXIBLE CYSTOSCOPY;  Surgeon: Rohan Doe MD;  Location: [facility] OR;  Service: Urology;   Laterality: N/A;    ENDOSCOPY N/A 10/19/2018    Procedure: ESOPHAGOGASTRODUODENOSCOPY possible dilation;  Surgeon: Julián Maldonado MD;  Location: [facility] ENDOSCOPY;  Service: Gastroenterology    ENDOSCOPY N/A 06/24/2020    Procedure: ESOPHAGOGASTRODUODENOSCOPY WED appt please;  Surgeon: Julián Maldonado MD;  Location: [facility] ENDOSCOPY;  Service: Gastroenterology;  Laterality: N/A;    ENDOSCOPY N/A 06/10/2022    Procedure: ESOPHAGOGASTRODUODENOSCOPY   room 380;  Surgeon: Jeremiah Wilkins MD;  Location: [facility] ENDOSCOPY;  Service: Gastroenterology;  Laterality: N/A;    ENDOSCOPY N/A 1/10/2023    Procedure: ESOPHAGOGASTRODUODENOSCOPY;  Surgeon: Jeremiah Wilkins MD;  Location: [facility] ENDOSCOPY;  Service: Gastroenterology;  Laterality: N/A;    ENDOSCOPY AND COLONOSCOPY      FOOT SURGERY      Toes    FOOT SURGERY      GASTRIC BANDING      Revision, laparoscopic    HYSTERECTOMY      INCISION AND DRAINAGE LEG Left 03/12/2021    Procedure: Left ankle arthroscopic irrigation and debridement, screw removal;  Surgeon: Ignacio Lord DPM;  Location: [facility] OR;  Service: Podiatry;  Laterality: Left;    INTERSTIM PLACEMENT N/A 8/16/2023    Procedure: INTERSTIM STAGE TWO IMPLANTABLE GENERATOR PLACEMENT BATTERY CHANGE, PLUS STAGE ONE LEAD PLACEMENT;  Surgeon: Rohan Doe MD;  Location: [facility] OR;  Service: Urology;  Laterality: N/A;    MOUTH SURGERY      SALPINGO OOPHORECTOMY      SHOULDER SURGERY      SUBTALAR ARTHRODESIS Left 01/16/2019    Procedure: LEFT FOOT HARDWARE REMOVAL, FIFTH METATARSAL , OPEN REDUCTION INTERNAL FIXATION, CALCANEAL OSTEOTOMY;  Surgeon: Ignacio Lord DPM;  Location: [facility] OR;  Service: Podiatry    SUBTALAR ARTHRODESIS Left 10/16/2019    Procedure: foot hardware removal, subtalar joint fusion  possible de/reattachment of achilles tendon        (c-arm);  Surgeon: Ignacio Lord DPM;  Location: [facility] OR;  Service: Podiatry    SUBTALAR ARTHRODESIS Left 09/30/2020    Procedure:  subtalar, talonavicular joint arthrodesis.  Removal hardware.          (c-arm);  Surgeon: Ignacio Lord DPM;  Location: [facility];  Service: Podiatry;  Laterality: Left;    TRANSESOPHAGEAL ECHOCARDIOGRAM (LAMONTE)      With color flow     Past Medical History:   Diagnosis Date    Acute bacterial endocarditis 03/13/2021    Angina, class IV     Anxiety     Anxiety and depression     Arthritis     Benign paroxysmal positional vertigo     Bladder disorder     has bladder stimulator    Carpal tunnel syndrome     CHF (congestive heart failure)     Chronic pain     Coronary atherosclerosis     hx CABG 2005.  is followed by Dr Cher Amaya     Diabetes mellitus     Type 2, controlled    Diabetic polyneuropathy     Disease of thyroid gland     Elevated cholesterol     Female stress incontinence     Foot pain, left     Full dentures     Gastroparesis     GERD (gastroesophageal reflux disease)     Hyperlipidemia     Hypertension     Low back pain     Malaise and fatigue     Meniere disease     Multiple joint pain     Obesity     Refuses to be weighed    Occlusion and stenosis of bilateral carotid arteries 06/18/2021    Otalgia     Both    Perforation of tympanic membrane     Left    Postoperative wound infection     Vitamin D deficiency     Wears glasses     reading     Social History     Socioeconomic History    Marital status:    Tobacco Use    Smoking status: Never     Passive exposure: Never    Smokeless tobacco: Never   Vaping Use    Vaping Use: Never used   Substance and Sexual Activity    Alcohol use: No    Drug use: No    Sexual activity: Defer     Family History   Problem Relation Age of Onset    Diabetes Other     Heart disease Other     Hypertension Other     Heart disease Mother     Stroke Mother     Hypertension Mother     Diabetes Sister     Heart disease Sister     Hypertension Sister     Heart disease Sister     Diabetes Sister     Hypertension Sister     Diabetes Sister     Diabetes Sister      Diabetes Sister     Diabetes Sister      Allergies   Allergen Reactions    Avandia [Rosiglitazone] Swelling    Morphine And Related Hallucinations    Oxycodone Hallucinations    Seroquel [Quetiapine] Anaphylaxis       Home Medications:  Prior to Admission medications    Medication Sig Start Date End Date Taking? Authorizing Provider   amLODIPine (NORVASC) 5 MG tablet Take 1 tablet by mouth Daily. 8/7/23  Yes Dolly Foss APRN   ARIPiprazole (ABILIFY) 5 MG tablet TAKE 1 TABLET BY MOUTH EVERY DAY  Patient taking differently: Take 1 tablet by mouth Daily. 7/19/23  Yes Dolly Foss APRN   aspirin  MG tablet Take 1 tablet by mouth Daily. 2/8/22  Yes Mahin Esteves MD   atorvastatin (LIPITOR) 80 MG tablet Take 1 tablet by mouth Daily. 7/14/23  Yes Dolly Foss APRN B-D UF III MINI PEN NEEDLES 31G X 5 MM misc USE FOUR TIMES DAILY FOR INSULIN 8/28/23  Yes Dolly Foss APRN B-D ULTRAFINE III SHORT PEN 31G X 8 MM misc USE TO INJECT 5 TIMES A DAY 11/11/22  Yes Elvis Gamboa APRN B-D ULTRAFINE III SHORT PEN 31G X 8 MM misc USE UP TO 4 (FOUR) TIMES DAILY WITH INSULIN AS DIRECTED. 12/27/22  Yes Dolly Foss APRN   BD SHARPS CONTAINER HOME misc 1 each Take As Directed. 9/7/18  Yes Francisca Fong MD   Blood Glucose Monitoring Suppl (ONE TOUCH ULTRA MINI) w/Device kit Patient will need the Accu-Check Avitio meter 10/18/21  Yes Kimberly Ferguson APRN   butalbital-acetaminophen-caffeine (FIORICET, ESGIC) -40 MG per tablet Take one to two tablets by mouth per headache episode as needed.  Patient taking differently: Take 2 tablets by mouth Take As Directed. Take one to two tablets by mouth per headache episode as needed. 4/21/23  Yes Dolly Foss APRN   Calcium Citrate-Vitamin D 250-200 MG-UNIT tablet Take 2 tablets by mouth 2 (two) times a day.   Yes Provider, MD Esther   clopidogrel (PLAVIX) 75 MG tablet TAKE 1 TABLET BY MOUTH EVERY DAY  Patient taking  differently: Take 1 tablet by mouth Daily. 6/23/23  Yes Dolly Foss APRN   cyanocobalamin 1000 MCG/ML injection Inject 1 mL into the appropriate muscle as directed by prescriber Every 28 (Twenty-Eight) Days. 6/27/23  Yes Dolly Foss APRN   dicyclomine (BENTYL) 20 MG tablet Take 1 tablet by mouth Every 6 (Six) Hours. 5/5/23  Yes ProviderEsther MD   doxycycline (VIBRAMYCIN) 100 MG capsule Take 1 capsule by mouth 2 (Two) Times a Day. 8/25/23  Yes Panda Charles APRN   famotidine (PEPCID) 40 MG tablet Take 1 tablet by mouth Daily. 4/14/23  Yes Provider, MD Esther   fluticasone (FLONASE) 50 MCG/ACT nasal spray INSTILL 2 SPRAYS IN EACH NOSTRIL AS DIRECTED BY PROVIDER DAILY  Patient taking differently: As Needed. 2/1/23  Yes Dolly Foss APRN   folic acid (FOLVITE) 1 MG tablet TAKE 1 TABLET BY MOUTH EVERY DAY  Patient taking differently: Take 1 tablet by mouth Daily. 4/18/23  Yes Teressa Hammond APRN   furosemide (LASIX) 40 MG tablet TAKE 1 TABLET BY MOUTH EVERY DAY  Patient taking differently: Take 1 tablet by mouth Daily. 2/10/23  Yes Dolly Foss APRN   gabapentin (NEURONTIN) 100 MG capsule Take 2 capsules by mouth Every 12 (Twelve) Hours. 8/29/23  Yes Dolly Foss APRN   glucose blood (Accu-Chek Guide) test strip 1 each by Other route 4 (Four) Times a Day. To test blood sugar 4X daily. For  Dx E11.65 2/28/23  Yes Kimberly Ferguson APRN   glucose monitor monitoring kit 1 each Daily. accucheck eve meter, E11.9 6/11/20  Yes Elvis Gamboa APRN   hydroCHLOROthiazide (HYDRODIURIL) 12.5 MG tablet Take 1 tablet by mouth Daily. 6/26/23  Yes Dolly Foss APRN   HYDROcodone-acetaminophen (NORCO) 7.5-325 MG per tablet Take 1 tablet by mouth Every 6 (Six) Hours As Needed for Moderate Pain. 8/21/23  Yes Pipo, Dolly Hope, APRN   Insulin Aspart (novoLOG) 100 UNIT/ML injection Inject 0-7 Units under the skin into the appropriate area as directed 3 (Three) Times a Day Before Meals.  6/8/23  Yes Mine Rodarte MD   Insulin Glargine (BASAGLAR KWIKPEN) 100 UNIT/ML injection pen Inject 40 units into the appropriate area every morning and 35 units into the appropriate area every night  Patient taking differently: 45 Units 2 (Two) Times a Day. Inject 40 units into the appropriate area every morning and 35 units into the appropriate area every night 10/24/22  Yes Elvis Gamboa APRN   isosorbide mononitrate (IMDUR) 30 MG 24 hr tablet TAKE 1 TABLET BY MOUTH EVERY DAY  Patient taking differently: Take 1 tablet by mouth Daily. 7/7/23  Yes Dolly Foss APRN   Lancets (accu-chek soft touch) lancets As directed 10/18/21  Yes Kimberly Ferguson APRN   levothyroxine (Synthroid) 50 MCG tablet Take 1 tablet by mouth Daily.  Patient taking differently: Take 1 tablet by mouth Every Night. 4/26/23 4/25/24 Yes Elvis Gamboa APRN   meclizine (ANTIVERT) 25 MG tablet Take 1 tablet by mouth 3 (Three) Times a Day As Needed for dizziness. 12/14/17  Yes Evon Hernandez APRN   meloxicam (MOBIC) 15 MG tablet TAKE 1 TABLET BY MOUTH EVERY DAY WITH FOOD  Patient taking differently: Take 1 tablet by mouth Daily As Needed. 12/7/22  Yes Rohan Lazo MD   methocarbamol (ROBAXIN) 500 MG tablet TAKE 1 TABLET BY MOUTH 2 TIMES A DAY AS NEEDED FOR MUSCLE SPASMS.  Patient taking differently: Take 1 tablet by mouth 2 (Two) Times a Day As Needed. 6/7/22  Yes Kimberly Ferguson APRN   metoclopramide (REGLAN) 10 MG tablet Take 1 tablet by mouth 4 (Four) Times a Day As Needed (nausea). 8/31/23  Yes Jeremiah Wilkins MD   metoprolol succinate XL (TOPROL-XL) 50 MG 24 hr tablet TAKE 1 TABLET BY MOUTH EVERY DAY  Patient taking differently: Take 1 tablet by mouth Daily. 6/21/23  Yes Dolly Foss APRN   naloxone (NARCAN) 0.4 MG/ML injection Infuse 0.5 mL into a venous catheter Every 5 (Five) Minutes As Needed for Opioid Reversal. 3/13/21  Yes Nick Faye MD   Needle, Disp, (Easy Touch  "FlipLock Needles) 25G X 1-1/2\" misc 1 each Every 28 (Twenty-Eight) Days. For administering B12 cyanocobalomin. Dx vitamin B12 deficiency. 5/24/22  Yes Kimberly Ferguson APRN   Needle, Disp, (Hypodermic Needle) 20G X 1\" misc 1 each Every 28 (Twenty-Eight) Days. For drawing up B12 for injection monthly, change back to smaller gauge needle prior to injection. Dx Vitamin B12  Deficiency. 5/24/22  Yes Kimberly Ferguson APRN   nitroglycerin (NITROSTAT) 0.4 MG SL tablet Place 1 tablet under the tongue Every 5 (Five) Minutes As Needed for Chest Pain. Take no more than 3 doses in 15 minutes. 4/26/22  Yes Kimberly Ferguson APRN   ondansetron (ZOFRAN) 8 MG tablet Take 1 tablet by mouth Every 8 (Eight) Hours As Needed for Nausea or Vomiting. 3/27/23  Yes Dolly Foss APRN   pantoprazole (PROTONIX) 20 MG EC tablet Take 2 tablets by mouth Daily. 2/20/23  Yes Dolly Foss APRN   potassium chloride 10 MEQ CR tablet Take 1 tablet by mouth 2 (Two) Times a Day. 8/29/23  Yes Jordy Godfrey MD   ranolazine (RANEXA) 500 MG 12 hr tablet TAKE 1 TABLET BY MOUTH EVERY 12 HOURS  Patient taking differently: Take 1 tablet by mouth 2 (Two) Times a Day. 6/9/23  Yes Bill Gibson MD   sucralfate (CARAFATE) 1 g tablet TAKE 1 TABLET BY MOUTH FOUR TIMES DAILY  Patient taking differently: Take  by mouth 4 (Four) Times a Day As Needed. 7/19/23  Yes Dolly Foss APRN   Syringe 20G X 1-1/2\" 3 ML misc 1 each Every 28 (Twenty-Eight) Days. For injection cyanocobalomin, Dx vit B12 deficiency. 5/24/22  Yes Kimberly Ferguson APRN   venlafaxine XR (EFFEXOR-XR) 75 MG 24 hr capsule Take 1 capsule by mouth Daily With Breakfast. 3/10/23  Yes Dolly Foss APRN   vitamin D (ERGOCALCIFEROL) 1.25 MG (75523 UT) capsule capsule TAKE 1 CAPSULE BY MOUTH EVERY 7 DAYS.  Patient taking differently: Take 1 capsule by mouth Every 7 (Seven) Days. TAKES EVERY SUNDAY 12/29/22  Yes Dolly Foss, APRN       Review of Systems "   Constitutional:  Negative for activity change, appetite change and fever.   Respiratory:  Negative for apnea, choking and chest tightness.    Cardiovascular:  Negative for chest pain.   Gastrointestinal:  Negative for abdominal distention and abdominal pain.   Genitourinary:  Negative for difficulty urinating.   Musculoskeletal:  Positive for arthralgias and back pain.   Psychiatric/Behavioral:  Negative for agitation, behavioral problems and confusion.      Vitals:    09/06/23 1026   BP: 116/60   Pulse: 66   Temp: 97 °F (36.1 °C)       Physical Exam  Constitutional:       General: She is not in acute distress.     Appearance: She is not ill-appearing, toxic-appearing or diaphoretic.   HENT:      Nose: Nose normal.   Eyes:      General: No scleral icterus.  Cardiovascular:      Rate and Rhythm: Normal rate.   Pulmonary:      Effort: Pulmonary effort is normal.      Breath sounds: Normal breath sounds.   Abdominal:      General: Abdomen is flat.      Palpations: Abdomen is soft.      Tenderness: There is no abdominal tenderness. There is no guarding.   Neurological:      General: No focal deficit present.      Mental Status: She is alert.   Psychiatric:         Mood and Affect: Mood normal.         Behavior: Behavior normal.   In a wheelchair.  Has had a left BKA    Assessment     In need of colonoscopy.    Plan     Risks, benefits, rationale and prep for colonoscopy have been discussed with the patient.  The patient indicates understanding of these issues and agrees with the plan.  We will hold Plavix for 1 week prior to the procedure.  Stents were placed many years ago

## 2023-09-07 RX ORDER — DICYCLOMINE HCL 20 MG
TABLET ORAL
Qty: 56 TABLET | Refills: 0 | Status: SHIPPED | OUTPATIENT
Start: 2023-09-07

## 2023-09-08 ENCOUNTER — PATIENT ROUNDING (BHMG ONLY) (OUTPATIENT)
Dept: SURGERY | Facility: CLINIC | Age: 63
End: 2023-09-08
Payer: COMMERCIAL

## 2023-09-08 NOTE — PROGRESS NOTES
"September 8, 2023    Hello, may I speak with Arianajudy Martinez? This is she.    My name is Felisa      I am a Medical Assistant with [facility] GENERAL SUR[facility]ERY    Before we get started may I verify your date of birth? 1960 Date Of Birth Verified.    I am calling to officially welcome you to our practice and ask about your recent visit. Is this a good time to talk? yes    Tell me about your visit with us. What things went well?  \"Everything went well. Dr. Santamaria was very informative, and answered my questions.\"    We're always looking for ways to make our patients' experiences even better. Do you have recommendations on ways we may improve?  no    Overall were you satisfied with your first visit to our practice? yes       I appreciate you taking the time to speak with me today. Is there anything else I can do for you? no      Thank you, and have a great day.    Patient is scheduled for colonoscopy. Patient instructed to call the office with ant questions or concerns.  "

## 2023-09-15 ENCOUNTER — HOSPITAL ENCOUNTER (OUTPATIENT)
Dept: NUCLEAR MEDICINE | Facility: HOSPITAL | Age: 63
Discharge: HOME | End: 2023-09-15
Payer: COMMERCIAL

## 2023-09-15 ENCOUNTER — HOSPITAL ENCOUNTER (OUTPATIENT)
Dept: CT IMAGING | Facility: HOSPITAL | Age: 63
Discharge: HOME | End: 2023-09-15
Admitting: NURSE PRACTITIONER
Payer: COMMERCIAL

## 2023-09-15 PROCEDURE — 0 TECHNETIUM MEDRONATE KIT: Performed by: NURSE PRACTITIONER

## 2023-09-15 PROCEDURE — 78315 BONE IMAGING 3 PHASE: CPT

## 2023-09-15 PROCEDURE — 73700 CT LOWER EXTREMITY W/O DYE: CPT

## 2023-09-15 PROCEDURE — A9503 TC99M MEDRONATE: HCPCS | Performed by: NURSE PRACTITIONER

## 2023-09-15 RX ORDER — TC 99M MEDRONATE 20 MG/10ML
27.5 INJECTION, POWDER, LYOPHILIZED, FOR SOLUTION INTRAVENOUS
Status: COMPLETED | OUTPATIENT
Start: 2023-09-15 | End: 2023-09-15

## 2023-09-15 RX ADMIN — Medication 27.5 MILLICURIE: at 09:09

## 2023-09-18 ENCOUNTER — TELEPHONE (OUTPATIENT)
Dept: FAMILY MEDICINE CLINIC | Facility: CLINIC | Age: 63
End: 2023-09-18
Payer: COMMERCIAL

## 2023-09-18 DIAGNOSIS — M54.41 CHRONIC RIGHT-SIDED LOW BACK PAIN WITH RIGHT-SIDED SCIATICA: ICD-10-CM

## 2023-09-18 DIAGNOSIS — G89.29 CHRONIC RIGHT-SIDED LOW BACK PAIN WITH RIGHT-SIDED SCIATICA: ICD-10-CM

## 2023-09-18 RX ORDER — HYDROCODONE BITARTRATE AND ACETAMINOPHEN 7.5; 325 MG/1; MG/1
1 TABLET ORAL EVERY 6 HOURS PRN
Qty: 120 TABLET | Refills: 0 | Status: CANCELLED | OUTPATIENT
Start: 2023-09-18

## 2023-09-18 RX ORDER — FAMOTIDINE 40 MG/1
40 TABLET, FILM COATED ORAL DAILY
Qty: 30 TABLET | Refills: 0 | Status: SHIPPED | OUTPATIENT
Start: 2023-09-18 | End: 2023-10-18

## 2023-09-19 PROBLEM — Z74.1 NEED FOR ASSISTANCE WITH PERSONAL CARE: Status: ACTIVE | Noted: 2023-06-08

## 2023-09-19 PROBLEM — M19.91 PRIMARY OSTEOARTHRITIS: Status: ACTIVE | Noted: 2023-06-08

## 2023-09-19 PROBLEM — G54.6 PHANTOM LIMB SYNDROME WITH PAIN: Status: ACTIVE | Noted: 2023-06-08

## 2023-09-19 PROBLEM — L03.115 CELLULITIS OF RIGHT LOWER LIMB: Status: ACTIVE | Noted: 2023-06-08

## 2023-09-19 PROBLEM — G45.9 TRANSIENT CEREBRAL ISCHEMIC ATTACK, UNSPECIFIED: Status: ACTIVE | Noted: 2023-06-08

## 2023-09-19 PROBLEM — M62.81 GENERALIZED MUSCLE WEAKNESS: Status: ACTIVE | Noted: 2023-06-08

## 2023-09-19 PROBLEM — Z86.718 PERSONAL HISTORY OF OTHER VENOUS THROMBOSIS AND EMBOLISM: Status: ACTIVE | Noted: 2023-06-08

## 2023-09-19 PROBLEM — E78.00 PURE HYPERCHOLESTEROLEMIA, UNSPECIFIED: Status: ACTIVE | Noted: 2023-06-08

## 2023-09-19 PROBLEM — M79.604 PAIN IN RIGHT LEG: Status: ACTIVE | Noted: 2023-06-08

## 2023-09-19 PROBLEM — Z89.512 ACQUIRED ABSENCE OF LEFT LEG BELOW KNEE: Status: ACTIVE | Noted: 2023-06-08

## 2023-09-19 PROBLEM — Z74.09 OTHER REDUCED MOBILITY: Status: ACTIVE | Noted: 2023-06-08

## 2023-09-19 PROBLEM — G62.9 POLYNEUROPATHY, UNSPECIFIED: Status: ACTIVE | Noted: 2023-06-08

## 2023-09-19 PROBLEM — I20.9 ANGINA PECTORIS, UNSPECIFIED: Status: ACTIVE | Noted: 2023-06-08

## 2023-09-19 PROBLEM — M79.605 PAIN IN LEFT LEG: Status: ACTIVE | Noted: 2023-06-08

## 2023-09-19 PROBLEM — I67.9 INTRACRANIAL VASCULAR STENOSIS: Status: ACTIVE | Noted: 2023-02-28

## 2023-09-19 PROBLEM — Z79.4 LONG TERM (CURRENT) USE OF INSULIN: Status: ACTIVE | Noted: 2023-06-08

## 2023-09-19 PROBLEM — I25.10 ATHEROSCLEROTIC HEART DISEASE OF NATIVE CORONARY ARTERY WITHOUT ANGINA PECTORIS: Status: ACTIVE | Noted: 2023-06-08

## 2023-09-19 PROBLEM — M54.50 LOW BACK PAIN, UNSPECIFIED: Status: ACTIVE | Noted: 2023-06-08

## 2023-09-19 PROBLEM — Z95.1 PRESENCE OF AORTOCORONARY BYPASS GRAFT: Status: ACTIVE | Noted: 2023-06-08

## 2023-09-19 PROBLEM — H81.10 BENIGN PAROXYSMAL VERTIGO, UNSPECIFIED EAR: Status: ACTIVE | Noted: 2023-06-09

## 2023-09-19 PROBLEM — E11.65 TYPE 2 DIABETES MELLITUS WITH HYPERGLYCEMIA: Status: ACTIVE | Noted: 2023-06-08

## 2023-09-19 PROBLEM — R51.9 HEADACHE, UNSPECIFIED: Status: ACTIVE | Noted: 2023-06-17

## 2023-09-19 PROBLEM — R26.81 UNSTEADINESS ON FEET: Status: ACTIVE | Noted: 2023-06-08

## 2023-09-19 PROBLEM — F31.9 BIPOLAR DISORDER, UNSPECIFIED: Status: ACTIVE | Noted: 2023-06-08

## 2023-09-20 ENCOUNTER — HOSPITAL ENCOUNTER (EMERGENCY)
Facility: HOSPITAL | Age: 63
Discharge: HOME | End: 2023-09-20
Attending: STUDENT IN AN ORGANIZED HEALTH CARE EDUCATION/TRAINING PROGRAM | Admitting: STUDENT IN AN ORGANIZED HEALTH CARE EDUCATION/TRAINING PROGRAM
Payer: COMMERCIAL

## 2023-09-20 ENCOUNTER — APPOINTMENT (OUTPATIENT)
Dept: GENERAL RADIOLOGY | Facility: HOSPITAL | Age: 63
End: 2023-09-20
Payer: COMMERCIAL

## 2023-09-20 VITALS
SYSTOLIC BLOOD PRESSURE: 182 MMHG | BODY MASS INDEX: 38.65 KG/M2 | DIASTOLIC BLOOD PRESSURE: 75 MMHG | OXYGEN SATURATION: 94 % | HEART RATE: 72 BPM | TEMPERATURE: 98.3 F | HEIGHT: 68 IN | WEIGHT: 255 LBS | RESPIRATION RATE: 18 BRPM

## 2023-09-20 DIAGNOSIS — R06.02 SOB (SHORTNESS OF BREATH): Primary | ICD-10-CM

## 2023-09-20 DIAGNOSIS — R53.83 FATIGUE, UNSPECIFIED TYPE: ICD-10-CM

## 2023-09-20 LAB
ALBUMIN SERPL-MCNC: 3.3 G/DL (ref 3.5–5.2)
ALBUMIN/GLOB SERPL: 0.9 G/DL
ALP SERPL-CCNC: 115 U/L (ref 39–117)
ALT SERPL W P-5'-P-CCNC: 17 U/L (ref 1–33)
ANION GAP SERPL CALCULATED.3IONS-SCNC: 14 MMOL/L (ref 5–15)
AST SERPL-CCNC: 23 U/L (ref 1–32)
BASOPHILS # BLD AUTO: 0.04 10*3/MM3 (ref 0–0.2)
BASOPHILS NFR BLD AUTO: 0.4 % (ref 0–1.5)
BILIRUB SERPL-MCNC: 0.5 MG/DL (ref 0–1.2)
BUN SERPL-MCNC: 10 MG/DL (ref 8–23)
BUN/CREAT SERPL: 9.6 (ref 7–25)
CALCIUM SPEC-SCNC: 9.3 MG/DL (ref 8.6–10.5)
CHLORIDE SERPL-SCNC: 98 MMOL/L (ref 98–107)
CO2 SERPL-SCNC: 21 MMOL/L (ref 22–29)
CREAT SERPL-MCNC: 1.04 MG/DL (ref 0.57–1)
DEPRECATED RDW RBC AUTO: 47.8 FL (ref 37–54)
EGFRCR SERPLBLD CKD-EPI 2021: 61.3 ML/MIN/1.73
EOSINOPHIL # BLD AUTO: 0.53 10*3/MM3 (ref 0–0.4)
EOSINOPHIL NFR BLD AUTO: 5.9 % (ref 0.3–6.2)
ERYTHROCYTE [DISTWIDTH] IN BLOOD BY AUTOMATED COUNT: 15.4 % (ref 12.3–15.4)
GLOBULIN UR ELPH-MCNC: 3.6 GM/DL
GLUCOSE SERPL-MCNC: 330 MG/DL (ref 65–99)
HCT VFR BLD AUTO: 41.4 % (ref 34–46.6)
HGB BLD-MCNC: 13.7 G/DL (ref 12–15.9)
HOLD SPECIMEN: NORMAL
HOLD SPECIMEN: NORMAL
IMM GRANULOCYTES # BLD AUTO: 0.04 10*3/MM3 (ref 0–0.05)
IMM GRANULOCYTES NFR BLD AUTO: 0.4 % (ref 0–0.5)
LYMPHOCYTES # BLD AUTO: 1.63 10*3/MM3 (ref 0.7–3.1)
LYMPHOCYTES NFR BLD AUTO: 18.1 % (ref 19.6–45.3)
MCH RBC QN AUTO: 28.3 PG (ref 26.6–33)
MCHC RBC AUTO-ENTMCNC: 33.1 G/DL (ref 31.5–35.7)
MCV RBC AUTO: 85.5 FL (ref 79–97)
MONOCYTES # BLD AUTO: 1.05 10*3/MM3 (ref 0.1–0.9)
MONOCYTES NFR BLD AUTO: 11.7 % (ref 5–12)
NEUTROPHILS NFR BLD AUTO: 5.72 10*3/MM3 (ref 1.7–7)
NEUTROPHILS NFR BLD AUTO: 63.5 % (ref 42.7–76)
NRBC BLD AUTO-RTO: 0 /100 WBC (ref 0–0.2)
NT-PROBNP SERPL-MCNC: 330.1 PG/ML (ref 0–900)
PLATELET # BLD AUTO: 317 10*3/MM3 (ref 140–450)
PMV BLD AUTO: 9.6 FL (ref 6–12)
POTASSIUM SERPL-SCNC: 4.3 MMOL/L (ref 3.5–5.2)
PROT SERPL-MCNC: 6.9 G/DL (ref 6–8.5)
QT INTERVAL: 382 MS
QTC INTERVAL: 446 MS
RBC # BLD AUTO: 4.84 10*6/MM3 (ref 3.77–5.28)
SODIUM SERPL-SCNC: 133 MMOL/L (ref 136–145)
TROPONIN T SERPL HS-MCNC: 21 NG/L
TROPONIN T SERPL HS-MCNC: 22 NG/L
WBC NRBC COR # BLD: 9.01 10*3/MM3 (ref 3.4–10.8)
WHOLE BLOOD HOLD COAG: NORMAL
WHOLE BLOOD HOLD SPECIMEN: NORMAL

## 2023-09-20 PROCEDURE — 80053 COMPREHEN METABOLIC PANEL: CPT | Performed by: STUDENT IN AN ORGANIZED HEALTH CARE EDUCATION/TRAINING PROGRAM

## 2023-09-20 PROCEDURE — 99284 EMERGENCY DEPT VISIT MOD MDM: CPT

## 2023-09-20 PROCEDURE — 84484 ASSAY OF TROPONIN QUANT: CPT | Performed by: STUDENT IN AN ORGANIZED HEALTH CARE EDUCATION/TRAINING PROGRAM

## 2023-09-20 PROCEDURE — 71045 X-RAY EXAM CHEST 1 VIEW: CPT

## 2023-09-20 PROCEDURE — 83880 ASSAY OF NATRIURETIC PEPTIDE: CPT | Performed by: STUDENT IN AN ORGANIZED HEALTH CARE EDUCATION/TRAINING PROGRAM

## 2023-09-20 PROCEDURE — 93005 ELECTROCARDIOGRAM TRACING: CPT | Performed by: STUDENT IN AN ORGANIZED HEALTH CARE EDUCATION/TRAINING PROGRAM

## 2023-09-20 PROCEDURE — 36415 COLL VENOUS BLD VENIPUNCTURE: CPT

## 2023-09-20 PROCEDURE — 85025 COMPLETE CBC W/AUTO DIFF WBC: CPT | Performed by: STUDENT IN AN ORGANIZED HEALTH CARE EDUCATION/TRAINING PROGRAM

## 2023-09-20 RX ORDER — SODIUM CHLORIDE 0.9 % (FLUSH) 0.9 %
10 SYRINGE (ML) INJECTION AS NEEDED
Status: DISCONTINUED | OUTPATIENT
Start: 2023-09-20 | End: 2023-09-20 | Stop reason: HOSPADM

## 2023-09-20 NOTE — ED PROVIDER NOTES
Subjective   History of Present Illness  63-year-old female comes to the ER chief complaint of worsening shortness of breath, nausea, vomiting, fatigue, coughing since last night.  Patient tested positive for COVID yesterday.  She has an inhaler at home that she has been using but it has not been very helpful.  Patient denies other symptoms.  Patient comes in today because she is not feeling any better.    History provided by:  Patient   used: No      Review of Systems   Constitutional:  Positive for activity change and fatigue. Negative for chills and fever.   HENT:  Negative for drooling.    Eyes:  Negative for redness.   Respiratory:  Positive for cough and shortness of breath.    Cardiovascular:  Negative for chest pain.   Gastrointestinal:  Positive for diarrhea, nausea and vomiting. Negative for abdominal pain and constipation.   Genitourinary:  Negative for flank pain.   Skin:  Negative for color change.   Neurological:  Negative for seizures.   Psychiatric/Behavioral:  Negative for confusion.      Past Medical History:   Diagnosis Date    Acute bacterial endocarditis 03/13/2021    Angina, class IV     Anxiety     Anxiety and depression     Arthritis     Benign paroxysmal positional vertigo     Bladder disorder     has bladder stimulator    Carpal tunnel syndrome     CHF (congestive heart failure)     Chronic pain     Coronary atherosclerosis     hx CABG 2005.  is followed by Dr Cher Amaya     Diabetes mellitus     Type 2, controlled    Diabetic polyneuropathy     Disease of thyroid gland     Elevated cholesterol     Female stress incontinence     Foot pain, left     Full dentures     Gastroparesis     GERD (gastroesophageal reflux disease)     Hyperlipidemia     Hypertension     Low back pain     Malaise and fatigue     Meniere disease     Multiple joint pain     Obesity     Refuses to be weighed    Occlusion and stenosis of bilateral carotid arteries 06/18/2021    Otalgia      Both    Perforation of tympanic membrane     Left    Postoperative wound infection     Vitamin D deficiency     Wears glasses     reading       Allergies   Allergen Reactions    Avandia [Rosiglitazone] Swelling    Morphine And Related Hallucinations    Oxycodone Hallucinations    Seroquel [Quetiapine] Anaphylaxis       Past Surgical History:   Procedure Laterality Date    ABDOMINAL SURGERY      AMPUTATION FOOT      ANGIOPLASTY      coronary    ANKLE ARTHROSCOPY Left 02/26/2021    Procedure: Left foot hardware removal, ankle arthroscopy, bone grafting , left foot exostectomy;  Surgeon: Ignacio Lord DPM;  Location: [facility] OR;  Service: Podiatry;  Laterality: Left;    BREAST BIOPSY Right     CARDIAC CATHETERIZATION      CARDIAC CATHETERIZATION N/A 06/20/2017    Procedure: Right Heart Cath;  Surgeon: Can Kwon MD PhD;  Location: [facility] CATH INVASIVE LOCATION;  Service:     CARDIAC CATHETERIZATION N/A 02/18/2020    Procedure: Left Heart Cath;  Surgeon: Catalina Cooper MD;  Location: [facility] CATH INVASIVE LOCATION;  Service: Cardiology;  Laterality: N/A;    CARDIAC CATHETERIZATION N/A 04/06/2022    Procedure: Left Heart Cath;  Surgeon: Sheryl Navas MD;  Location: [facility] CATH INVASIVE LOCATION;  Service: Cardiology;  Laterality: N/A;    CARPAL TUNNEL RELEASE      CHOLECYSTECTOMY      COLONOSCOPY N/A 06/24/2020    Procedure: COLONOSCOPY;  Surgeon: Julián Maldonado MD;  Location: [facility] ENDOSCOPY;  Service: Gastroenterology;  Laterality: N/A;    CORONARY ARTERY BYPASS GRAFT  2005    CYSTOSCOPY N/A 5/27/2023    Procedure: FLEXIBLE CYSTOSCOPY;  Surgeon: Rohan Doe MD;  Location: [facility] OR;  Service: Urology;  Laterality: N/A;    ENDOSCOPY N/A 10/19/2018    Procedure: ESOPHAGOGASTRODUODENOSCOPY possible dilation;  Surgeon: Julián Maldonado MD;  Location: [facility] ENDOSCOPY;  Service: Gastroenterology    ENDOSCOPY N/A 06/24/2020    Procedure: ESOPHAGOGASTRODUODENOSCOPY WED appt please;  Surgeon:  Julián Maldonado MD;  Location: [facility] ENDOSCOPY;  Service: Gastroenterology;  Laterality: N/A;    ENDOSCOPY N/A 06/10/2022    Procedure: ESOPHAGOGASTRODUODENOSCOPY   room 380;  Surgeon: Jeremiah Wilkins MD;  Location: [facility] ENDOSCOPY;  Service: Gastroenterology;  Laterality: N/A;    ENDOSCOPY N/A 1/10/2023    Procedure: ESOPHAGOGASTRODUODENOSCOPY;  Surgeon: Jeremiah Wilkins MD;  Location: [facility] ENDOSCOPY;  Service: Gastroenterology;  Laterality: N/A;    ENDOSCOPY AND COLONOSCOPY      FOOT SURGERY      Toes    FOOT SURGERY      GASTRIC BANDING      Revision, laparoscopic    HYSTERECTOMY      INCISION AND DRAINAGE LEG Left 03/12/2021    Procedure: Left ankle arthroscopic irrigation and debridement, screw removal;  Surgeon: Ignacio Lord DPM;  Location: [facility] OR;  Service: Podiatry;  Laterality: Left;    INTERSTIM PLACEMENT N/A 8/16/2023    Procedure: INTERSTIM STAGE TWO IMPLANTABLE GENERATOR PLACEMENT BATTERY CHANGE, PLUS STAGE ONE LEAD PLACEMENT;  Surgeon: Rohan Doe MD;  Location: [facility] OR;  Service: Urology;  Laterality: N/A;    MOUTH SURGERY      SALPINGO OOPHORECTOMY      SHOULDER SURGERY      SUBTALAR ARTHRODESIS Left 01/16/2019    Procedure: LEFT FOOT HARDWARE REMOVAL, FIFTH METATARSAL , OPEN REDUCTION INTERNAL FIXATION, CALCANEAL OSTEOTOMY;  Surgeon: Ignacio Lord DPM;  Location: [facility] OR;  Service: Podiatry    SUBTALAR ARTHRODESIS Left 10/16/2019    Procedure: foot hardware removal, subtalar joint fusion  possible de/reattachment of achilles tendon        (c-arm);  Surgeon: Ignacio Lord DPM;  Location: [facility] OR;  Service: Podiatry    SUBTALAR ARTHRODESIS Left 09/30/2020    Procedure: subtalar, talonavicular joint arthrodesis.  Removal hardware.          (c-arm);  Surgeon: Ignacio Lord DPM;  Location: [facility] OR;  Service: Podiatry;  Laterality: Left;    TRANSESOPHAGEAL ECHOCARDIOGRAM (LAMONTE)      With color flow       Family History   Problem Relation Age of Onset     Diabetes Other     Heart disease Other     Hypertension Other     Heart disease Mother     Stroke Mother     Hypertension Mother     Diabetes Sister     Heart disease Sister     Hypertension Sister     Heart disease Sister     Diabetes Sister     Hypertension Sister     Diabetes Sister     Diabetes Sister     Diabetes Sister     Diabetes Sister        Social History     Socioeconomic History    Marital status:    Tobacco Use    Smoking status: Never     Passive exposure: Never    Smokeless tobacco: Never   Vaping Use    Vaping Use: Never used   Substance and Sexual Activity    Alcohol use: No    Drug use: No    Sexual activity: Defer           Objective   Vitals:    09/20/23 1705 09/20/23 1706 09/20/23 1800 09/20/23 1900   BP:  148/67 176/79 175/85   BP Location:       Patient Position:       Pulse: 81 84 78 76   Resp: 20 19 20   Temp:       TempSrc:       SpO2: 96% 97%  94%   Weight:       Height:           Physical Exam  Vitals and nursing note reviewed.   Constitutional:       General: She is not in acute distress.     Appearance: She is well-developed. She is obese. She is not ill-appearing or diaphoretic.   HENT:      Head: Normocephalic.   Eyes:      Conjunctiva/sclera: Conjunctivae normal.   Pulmonary:      Effort: Pulmonary effort is normal. No accessory muscle usage or respiratory distress.   Skin:     General: Skin is warm and dry.   Neurological:      Mental Status: She is oriented to person, place, and time.      Coordination: Coordination normal.       ECG 12 Lead      Date/Time: 9/20/2023 7:51 PM  Performed by: Addi Johnson MD  Authorized by: Addi Johnson MD   Interpreted by physician  Rhythm: sinus rhythm  Rate: normal  QRS axis: right  ST segment elevation noted on lead: none.  Clinical impression: abnormal ECG             ED Course      Results for orders placed or performed during the hospital encounter of 09/20/23   Comprehensive Metabolic Panel    Specimen: Blood   Result Value  Ref Range    Glucose 330 (H) 65 - 99 mg/dL    BUN 10 8 - 23 mg/dL    Creatinine 1.04 (H) 0.57 - 1.00 mg/dL    Sodium 133 (L) 136 - 145 mmol/L    Potassium 4.3 3.5 - 5.2 mmol/L    Chloride 98 98 - 107 mmol/L    CO2 21.0 (L) 22.0 - 29.0 mmol/L    Calcium 9.3 8.6 - 10.5 mg/dL    Total Protein 6.9 6.0 - 8.5 g/dL    Albumin 3.3 (L) 3.5 - 5.2 g/dL    ALT (SGPT) 17 1 - 33 U/L    AST (SGOT) 23 1 - 32 U/L    Alkaline Phosphatase 115 39 - 117 U/L    Total Bilirubin 0.5 0.0 - 1.2 mg/dL    Globulin 3.6 gm/dL    A/G Ratio 0.9 g/dL    BUN/Creatinine Ratio 9.6 7.0 - 25.0    Anion Gap 14.0 5.0 - 15.0 mmol/L    eGFR 61.3 >60.0 mL/min/1.73   BNP    Specimen: Blood   Result Value Ref Range    proBNP 330.1 0.0 - 900.0 pg/mL   Single High Sensitivity Troponin T    Specimen: Blood   Result Value Ref Range    HS Troponin T 22 (H) <10 ng/L   CBC Auto Differential    Specimen: Blood   Result Value Ref Range    WBC 9.01 3.40 - 10.80 10*3/mm3    RBC 4.84 3.77 - 5.28 10*6/mm3    Hemoglobin 13.7 12.0 - 15.9 g/dL    Hematocrit 41.4 34.0 - 46.6 %    MCV 85.5 79.0 - 97.0 fL    MCH 28.3 26.6 - 33.0 pg    MCHC 33.1 31.5 - 35.7 g/dL    RDW 15.4 12.3 - 15.4 %    RDW-SD 47.8 37.0 - 54.0 fl    MPV 9.6 6.0 - 12.0 fL    Platelets 317 140 - 450 10*3/mm3    Neutrophil % 63.5 42.7 - 76.0 %    Lymphocyte % 18.1 (L) 19.6 - 45.3 %    Monocyte % 11.7 5.0 - 12.0 %    Eosinophil % 5.9 0.3 - 6.2 %    Basophil % 0.4 0.0 - 1.5 %    Immature Grans % 0.4 0.0 - 0.5 %    Neutrophils, Absolute 5.72 1.70 - 7.00 10*3/mm3    Lymphocytes, Absolute 1.63 0.70 - 3.10 10*3/mm3    Monocytes, Absolute 1.05 (H) 0.10 - 0.90 10*3/mm3    Eosinophils, Absolute 0.53 (H) 0.00 - 0.40 10*3/mm3    Basophils, Absolute 0.04 0.00 - 0.20 10*3/mm3    Immature Grans, Absolute 0.04 0.00 - 0.05 10*3/mm3    nRBC 0.0 0.0 - 0.2 /100 WBC   Single High Sensitivity Troponin T    Specimen: Blood   Result Value Ref Range    HS Troponin T 21 (H) <10 ng/L   ECG 12 Lead Other; SOA   Result Value Ref Range     QT Interval 382 ms    QTC Interval 446 ms   Green Top (Gel)   Result Value Ref Range    Extra Tube Hold for add-ons.    Lavender Top   Result Value Ref Range    Extra Tube hold for add-on    Gold Top - SST   Result Value Ref Range    Extra Tube Hold for add-ons.    Light Blue Top   Result Value Ref Range    Extra Tube Hold for add-ons.      *Note: Due to a large number of results and/or encounters for the requested time period, some results have not been displayed. A complete set of results can be found in Results Review.     XR Chest 1 View   Final Result   No acute findings.                       Medical Decision Making  Vital signs are stable, afebrile.  Labs are unremarkable.  First troponin is 22 with a repeat trending downward.  Chest x-ray shows no acute cardiopulmonary processes.  EKG is sinus rhythm no acute ischemic changes.  Patient satting well on room air.  Symptomatic care discussed.  Recommend follow-up with her PCP.  Return precautions given.    Problems Addressed:  Fatigue, unspecified type: complicated acute illness or injury  SOB (shortness of breath): complicated acute illness or injury    Amount and/or Complexity of Data Reviewed  Labs: ordered.  Radiology: ordered.  ECG/medicine tests: ordered.    Risk  Prescription drug management.        Final diagnoses:   SOB (shortness of breath)   Fatigue, unspecified type       ED Disposition  ED Disposition       ED Disposition   Discharge    Condition   Stable    Comment   --               Dolly Foss, APRN  [address] [place] [address] [phone]    Schedule an appointment as soon as possible for a visit in 2 days  As needed, ER follow up         Medication List        Changed      BASAGLAR KWIKPEN 100 UNIT/ML injection pen  Inject 40 units into the appropriate area every morning and 35 units into the appropriate area every night  What changed:   how much to take  when to take this     butalbital-acetaminophen-caffeine -40 MG  per tablet  Commonly known as: FIORICET, ESGIC  Take one to two tablets by mouth per headache episode as needed.  What changed:   how much to take  how to take this  when to take this     levothyroxine 50 MCG tablet  Commonly known as: Synthroid  Take 1 tablet by mouth Daily.  What changed: when to take this     meloxicam 15 MG tablet  Commonly known as: MOBIC  TAKE 1 TABLET BY MOUTH EVERY DAY WITH FOOD  What changed:   when to take this  reasons to take this  additional instructions     methocarbamol 500 MG tablet  Commonly known as: ROBAXIN  TAKE 1 TABLET BY MOUTH 2 TIMES A DAY AS NEEDED FOR MUSCLE SPASMS.  What changed: reasons to take this     ranolazine 500 MG 12 hr tablet  Commonly known as: RANEXA  TAKE 1 TABLET BY MOUTH EVERY 12 HOURS  What changed: when to take this     sucralfate 1 g tablet  Commonly known as: CARAFATE  TAKE 1 TABLET BY MOUTH FOUR TIMES DAILY  What changed:   how much to take  when to take this  reasons to take this     vitamin D 1.25 MG (78438 UT) capsule capsule  Commonly known as: ERGOCALCIFEROL  TAKE 1 CAPSULE BY MOUTH EVERY 7 DAYS.  What changed: additional instructions                 Addi Johnson MD  09/20/23 1951

## 2023-09-20 NOTE — PROGRESS NOTES
Chief Complaint   Patient presents with    Nausea       Subjective    Ariana Martinez is a 63 y.o. female.    History of Present Illness  Patient presented to GI clinic for follow-up visit today.  Has continued symptoms of abdominal pain and nausea.  Denied vomiting, diarrhea, constipation, rectal bleeding or weight loss.  Gastric emptying scan was consistent with 0% emptying after 120 minutes.       The following portions of the patient's history were reviewed and updated as appropriate:   Past Medical History:   Diagnosis Date    Acute bacterial endocarditis 03/13/2021    Angina, class IV     Anxiety     Anxiety and depression     Arthritis     Benign paroxysmal positional vertigo     Bladder disorder     has bladder stimulator    Carpal tunnel syndrome     CHF (congestive heart failure)     Chronic pain     Coronary atherosclerosis     hx CABG 2005.  is followed by Dr Cher Amaya     Diabetes mellitus     Type 2, controlled    Diabetic polyneuropathy     Disease of thyroid gland     Elevated cholesterol     Female stress incontinence     Foot pain, left     Full dentures     Gastroparesis     GERD (gastroesophageal reflux disease)     Hyperlipidemia     Hypertension     Low back pain     Malaise and fatigue     Meniere disease     Multiple joint pain     Obesity     Refuses to be weighed    Occlusion and stenosis of bilateral carotid arteries 06/18/2021    Otalgia     Both    Perforation of tympanic membrane     Left    Postoperative wound infection     Vitamin D deficiency     Wears glasses     reading     Past Surgical History:   Procedure Laterality Date    ABDOMINAL SURGERY      AMPUTATION FOOT      ANGIOPLASTY      coronary    ANKLE ARTHROSCOPY Left 02/26/2021    Procedure: Left foot hardware removal, ankle arthroscopy, bone grafting , left foot exostectomy;  Surgeon: Ignacio Lord DPM;  Location: [facility];  Service: Podiatry;  Laterality: Left;    BREAST BIOPSY Right     CARDIAC  CATHETERIZATION      CARDIAC CATHETERIZATION N/A 06/20/2017    Procedure: Right Heart Cath;  Surgeon: Can Kwon MD PhD;  Location: [facility] CATH INVASIVE LOCATION;  Service:     CARDIAC CATHETERIZATION N/A 02/18/2020    Procedure: Left Heart Cath;  Surgeon: Catalina Cooper MD;  Location: [facility] CATH INVASIVE LOCATION;  Service: Cardiology;  Laterality: N/A;    CARDIAC CATHETERIZATION N/A 04/06/2022    Procedure: Left Heart Cath;  Surgeon: Sheryl Navas MD;  Location: [facility] CATH INVASIVE LOCATION;  Service: Cardiology;  Laterality: N/A;    CARPAL TUNNEL RELEASE      CHOLECYSTECTOMY      COLONOSCOPY N/A 06/24/2020    Procedure: COLONOSCOPY;  Surgeon: Julián Maldonado MD;  Location: [facility] ENDOSCOPY;  Service: Gastroenterology;  Laterality: N/A;    CORONARY ARTERY BYPASS GRAFT  2005    CYSTOSCOPY N/A 5/27/2023    Procedure: FLEXIBLE CYSTOSCOPY;  Surgeon: Rohan Doe MD;  Location: [facility] OR;  Service: Urology;  Laterality: N/A;    ENDOSCOPY N/A 10/19/2018    Procedure: ESOPHAGOGASTRODUODENOSCOPY possible dilation;  Surgeon: Julián Maldonado MD;  Location: [facility] ENDOSCOPY;  Service: Gastroenterology    ENDOSCOPY N/A 06/24/2020    Procedure: ESOPHAGOGASTRODUODENOSCOPY WED appt please;  Surgeon: Julián Maldonado MD;  Location: [facility] ENDOSCOPY;  Service: Gastroenterology;  Laterality: N/A;    ENDOSCOPY N/A 06/10/2022    Procedure: ESOPHAGOGASTRODUODENOSCOPY   room 380;  Surgeon: Jeremiah Wilkins MD;  Location: [facility] ENDOSCOPY;  Service: Gastroenterology;  Laterality: N/A;    ENDOSCOPY N/A 1/10/2023    Procedure: ESOPHAGOGASTRODUODENOSCOPY;  Surgeon: Jeremiah Wilkins MD;  Location: [facility] ENDOSCOPY;  Service: Gastroenterology;  Laterality: N/A;    ENDOSCOPY AND COLONOSCOPY      FOOT SURGERY      Toes    FOOT SURGERY      GASTRIC BANDING      Revision, laparoscopic    HYSTERECTOMY      INCISION AND DRAINAGE LEG Left 03/12/2021    Procedure: Left ankle arthroscopic irrigation and debridement,  screw removal;  Surgeon: Ignacio Lord DPM;  Location: [facility];  Service: Podiatry;  Laterality: Left;    INTERSTIM PLACEMENT N/A 2023    Procedure: INTERSTIM STAGE TWO IMPLANTABLE GENERATOR PLACEMENT BATTERY CHANGE, PLUS STAGE ONE LEAD PLACEMENT;  Surgeon: Rohan Doe MD;  Location: [facility];  Service: Urology;  Laterality: N/A;    MOUTH SURGERY      SALPINGO OOPHORECTOMY      SHOULDER SURGERY      SUBTALAR ARTHRODESIS Left 2019    Procedure: LEFT FOOT HARDWARE REMOVAL, FIFTH METATARSAL , OPEN REDUCTION INTERNAL FIXATION, CALCANEAL OSTEOTOMY;  Surgeon: Ignacio Lord DPM;  Location: [facility] OR;  Service: Podiatry    SUBTALAR ARTHRODESIS Left 10/16/2019    Procedure: foot hardware removal, subtalar joint fusion  possible de/reattachment of achilles tendon        (c-arm);  Surgeon: Ignacio Lord DPM;  Location: [facility];  Service: Podiatry    SUBTALAR ARTHRODESIS Left 2020    Procedure: subtalar, talonavicular joint arthrodesis.  Removal hardware.          (c-arm);  Surgeon: Ignacio Lord DPM;  Location: [facility];  Service: Podiatry;  Laterality: Left;    TRANSESOPHAGEAL ECHOCARDIOGRAM (LAMONTE)      With color flow     Family History   Problem Relation Age of Onset    Diabetes Other     Heart disease Other     Hypertension Other     Heart disease Mother     Stroke Mother     Hypertension Mother     Diabetes Sister     Heart disease Sister     Hypertension Sister     Heart disease Sister     Diabetes Sister     Hypertension Sister     Diabetes Sister     Diabetes Sister     Diabetes Sister     Diabetes Sister      OB History          0    Para   0    Term   0       0    AB   0    Living   0         SAB   0    IAB   0    Ectopic   0    Molar        Multiple   0    Live Births                  Prior to Admission medications    Medication Sig Start Date End Date Taking? Authorizing Provider   famotidine (PEPCID) 40 MG tablet Take 1 tablet by mouth Daily for 30  days. 9/18/23 10/18/23 Yes Jeremiah Wilkins MD   metoclopramide (REGLAN) 10 MG tablet Take 1 tablet by mouth 4 (Four) Times a Day As Needed (nausea). 8/31/23  Yes Jeremiah Wilkins MD   albuterol sulfate  (90 Base) MCG/ACT inhaler Inhale 2 puffs Every 4 (Four) Hours As Needed (cough). 9/19/23   Bessie Engel APRN   amLODIPine (NORVASC) 5 MG tablet Take 1 tablet by mouth Daily. 8/7/23   Dolly Foss APRN   ARIPiprazole (ABILIFY) 5 MG tablet TAKE 1 TABLET BY MOUTH EVERY DAY  Patient taking differently: Take 1 tablet by mouth Daily. 7/19/23   Dolly Foss APRN   aspirin  MG tablet Take 1 tablet by mouth Daily. 2/8/22   Mahin Esteves MD   atorvastatin (LIPITOR) 80 MG tablet Take 1 tablet by mouth Daily. 7/14/23   Dolly Foss APRN B-D UF III MINI PEN NEEDLES 31G X 5 MM misc USE FOUR TIMES DAILY FOR INSULIN 8/28/23   Dolly Foss APRN B-D ULTRAFINE III SHORT PEN 31G X 8 MM misc USE TO INJECT 5 TIMES A DAY 11/11/22   Elvis Gamboa APRN B-D ULTRAFINE III SHORT PEN 31G X 8 MM misc USE UP TO 4 (FOUR) TIMES DAILY WITH INSULIN AS DIRECTED. 12/27/22   Dolly Foss APRN   BD SHARPS CONTAINER HOME misc 1 each Take As Directed. 9/7/18   Francisca Fong MD   Blood Glucose Monitoring Suppl (ONE TOUCH ULTRA MINI) w/Device kit Patient will need the Accu-Check Avitio meter 10/18/21   Kimberly Ferguson APRN   butalbital-acetaminophen-caffeine (FIORICET, ESGIC) -40 MG per tablet Take one to two tablets by mouth per headache episode as needed.  Patient taking differently: Take 2 tablets by mouth Take As Directed. Take one to two tablets by mouth per headache episode as needed. 4/21/23   Dolly Foss APRN   Calcium Citrate-Vitamin D 250-200 MG-UNIT tablet Take 2 tablets by mouth 2 (two) times a day.    Provider, MD Esther   clopidogrel (PLAVIX) 75 MG tablet TAKE 1 TABLET BY MOUTH EVERY DAY  Patient taking differently: Take 1 tablet by mouth Daily. 6/23/23    Dolly Foss APRN   cyanocobalamin 1000 MCG/ML injection Inject 1 mL into the appropriate muscle as directed by prescriber Every 28 (Twenty-Eight) Days. 6/27/23   Dolly Foss APRN   dicyclomine (BENTYL) 20 MG tablet TAKE 1 TABLET BY MOUTH EVERY SIX HOURS 9/7/23   Jeremiah Wilkins MD   fluticasone (FLONASE) 50 MCG/ACT nasal spray 2 sprays into the nostril(s) as directed by provider As Needed (nasal congestion). 9/19/23   Bessie Engel APRN   folic acid (FOLVITE) 1 MG tablet TAKE 1 TABLET BY MOUTH EVERY DAY  Patient taking differently: Take 1 tablet by mouth Daily. 4/18/23   Teressa Hammond APRN   furosemide (LASIX) 40 MG tablet TAKE 1 TABLET BY MOUTH EVERY DAY  Patient taking differently: Take 1 tablet by mouth Daily. 2/10/23   Dolly Foss APRN   gabapentin (NEURONTIN) 100 MG capsule Take 2 capsules by mouth Every 12 (Twelve) Hours. 8/29/23   Dolly Foss APRN   glucose blood (Accu-Chek Guide) test strip 1 each by Other route 4 (Four) Times a Day. To test blood sugar 4X daily. For  Dx E11.65 2/28/23   Ferguson, BEBE Luu   glucose monitor monitoring kit 1 each Daily. accucheck eve meter, E11.9 6/11/20   Elvis Gamboa APRN   guaiFENesin (MUCINEX) 600 MG 12 hr tablet Take 1 tablet by mouth 2 (Two) Times a Day. 9/19/23   Bessie Engel APRN   hydroCHLOROthiazide (HYDRODIURIL) 12.5 MG tablet Take 1 tablet by mouth Daily. 6/26/23   Dolly Foss APRN   HYDROcodone-acetaminophen (NORCO) 7.5-325 MG per tablet Take 1 tablet by mouth Every 6 (Six) Hours As Needed for Moderate Pain. 8/21/23   Dolly Foss APRN   Insulin Aspart (novoLOG) 100 UNIT/ML injection Inject 0-7 Units under the skin into the appropriate area as directed 3 (Three) Times a Day Before Meals. 6/8/23   Mine Rodarte MD   Insulin Glargine (BASAGLAR KWIKPEN) 100 UNIT/ML injection pen Inject 40 units into the appropriate area every morning and 35 units into the appropriate area every night  Patient taking  "differently: 45 Units 2 (Two) Times a Day. Inject 40 units into the appropriate area every morning and 35 units into the appropriate area every night 10/24/22   Elvis Gamboa APRN   isosorbide mononitrate (IMDUR) 30 MG 24 hr tablet TAKE 1 TABLET BY MOUTH EVERY DAY  Patient taking differently: Take 1 tablet by mouth Daily. 7/7/23   Dolly Foss APRN   Lancets (accu-chek soft touch) lancets As directed 10/18/21   Kimberly Ferguson APRN   levothyroxine (Synthroid) 50 MCG tablet Take 1 tablet by mouth Daily.  Patient taking differently: Take 1 tablet by mouth Every Night. 4/26/23 4/25/24  Elvis Gamboa APRN   meclizine (ANTIVERT) 25 MG tablet Take 1 tablet by mouth 3 (Three) Times a Day As Needed for dizziness. 12/14/17   Evon Hernandez APRN   meloxicam (MOBIC) 15 MG tablet TAKE 1 TABLET BY MOUTH EVERY DAY WITH FOOD  Patient taking differently: Take 1 tablet by mouth Daily As Needed. 12/7/22   Rohan Lazo MD   methocarbamol (ROBAXIN) 500 MG tablet TAKE 1 TABLET BY MOUTH 2 TIMES A DAY AS NEEDED FOR MUSCLE SPASMS.  Patient taking differently: Take 1 tablet by mouth 2 (Two) Times a Day As Needed. 6/7/22   Kimberly Ferguson APRN   metoprolol succinate XL (TOPROL-XL) 50 MG 24 hr tablet TAKE 1 TABLET BY MOUTH EVERY DAY  Patient taking differently: Take 1 tablet by mouth Daily. 6/21/23   Dolly Foss APRN   naloxone (NARCAN) 0.4 MG/ML injection Infuse 0.5 mL into a venous catheter Every 5 (Five) Minutes As Needed for Opioid Reversal. 3/13/21   Nick Faye MD Needle, Disp, (Easy Touch FlipLock Needles) 25G X 1-1/2\" misc 1 each Every 28 (Twenty-Eight) Days. For administering B12 cyanocobalomin. Dx vitamin B12 deficiency. 5/24/22   Kimberly Ferguson APRN Needle, Disp, (Hypodermic Needle) 20G X 1\" misc 1 each Every 28 (Twenty-Eight) Days. For drawing up B12 for injection monthly, change back to smaller gauge needle prior to injection. Dx Vitamin B12  Deficiency. " "5/24/22   Ferguson, BEBE Luu   nitroglycerin (NITROSTAT) 0.4 MG SL tablet Place 1 tablet under the tongue Every 5 (Five) Minutes As Needed for Chest Pain. Take no more than 3 doses in 15 minutes. 4/26/22   Marty, BEBE Luu   NovoLOG FlexPen 100 UNIT/ML solution pen-injector sc pen  9/18/23   Provider, MD Esther   ondansetron (ZOFRAN) 8 MG tablet Take 1 tablet by mouth Every 8 (Eight) Hours As Needed for Nausea or Vomiting. 3/27/23   Dolly Foss APRN   pantoprazole (PROTONIX) 20 MG EC tablet Take 2 tablets by mouth Daily. 2/20/23   Dolly Foss APRN   potassium chloride 10 MEQ CR tablet Take 1 tablet by mouth 2 (Two) Times a Day. 8/29/23   Jordy Godfrey MD   ranolazine (RANEXA) 500 MG 12 hr tablet TAKE 1 TABLET BY MOUTH EVERY 12 HOURS  Patient taking differently: Take 1 tablet by mouth 2 (Two) Times a Day. 6/9/23   Bill Gibson MD   sucralfate (CARAFATE) 1 g tablet TAKE 1 TABLET BY MOUTH FOUR TIMES DAILY  Patient taking differently: Take  by mouth 4 (Four) Times a Day As Needed. 7/19/23   Dolly Foss APRN   Syringe 20G X 1-1/2\" 3 ML misc 1 each Every 28 (Twenty-Eight) Days. For injection cyanocobalomin, Dx vit B12 deficiency. 5/24/22   Marty, BEBE Luu   venlafaxine XR (EFFEXOR-XR) 75 MG 24 hr capsule Take 1 capsule by mouth Daily With Breakfast. 3/10/23   Dolly Foss APRN   vitamin D (ERGOCALCIFEROL) 1.25 MG (91983 UT) capsule capsule TAKE 1 CAPSULE BY MOUTH EVERY 7 DAYS.  Patient taking differently: Take 1 capsule by mouth Every 7 (Seven) Days. TAKES EVERY SUNDAY 12/29/22   Dolly Foss APRN     Allergies   Allergen Reactions    Avandia [Rosiglitazone] Swelling    Morphine And Related Hallucinations    Oxycodone Hallucinations    Seroquel [Quetiapine] Anaphylaxis     Social History     Socioeconomic History    Marital status:    Tobacco Use    Smoking status: Never     Passive exposure: Never    Smokeless tobacco: Never   Vaping Use    " "Vaping Use: Never used   Substance and Sexual Activity    Alcohol use: No    Drug use: No    Sexual activity: Defer       Review of Systems  Review of Systems   Constitutional:  Negative for chills, fatigue, fever and unexpected weight change.   HENT:  Negative for congestion, ear discharge, hearing loss, nosebleeds and sore throat.    Eyes:  Negative for pain, discharge and redness.   Respiratory:  Negative for cough, chest tightness, shortness of breath and wheezing.    Cardiovascular:  Negative for chest pain and palpitations.   Gastrointestinal:  Positive for abdominal pain and nausea. Negative for abdominal distention, blood in stool, constipation, diarrhea and vomiting.   Endocrine: Negative for cold intolerance, polydipsia, polyphagia and polyuria.   Genitourinary:  Negative for dysuria, flank pain, frequency, hematuria and urgency.   Musculoskeletal:  Negative for arthralgias, back pain, joint swelling and myalgias.   Skin:  Negative for color change, pallor and rash.   Neurological:  Negative for tremors, seizures, syncope, weakness and headaches.   Hematological:  Negative for adenopathy. Does not bruise/bleed easily.   Psychiatric/Behavioral:  Negative for behavioral problems, confusion, dysphoric mood, hallucinations and suicidal ideas. The patient is not nervous/anxious.       /74 (BP Location: Left arm)   Pulse 69   Ht 172.7 cm (68\")   Wt 116 kg (255 lb)   BMI 38.77 kg/m²     Objective    Physical Exam  Constitutional:       Appearance: She is well-developed.   HENT:      Head: Normocephalic and atraumatic.   Eyes:      Conjunctiva/sclera: Conjunctivae normal.      Pupils: Pupils are equal, round, and reactive to light.   Neck:      Thyroid: No thyromegaly.   Cardiovascular:      Rate and Rhythm: Normal rate and regular rhythm.      Heart sounds: Normal heart sounds. No murmur heard.  Pulmonary:      Effort: Pulmonary effort is normal.      Breath sounds: Normal breath sounds. No wheezing. "   Abdominal:      General: Bowel sounds are normal. There is no distension.      Palpations: Abdomen is soft. There is no mass.      Tenderness: There is no abdominal tenderness.      Hernia: No hernia is present.   Genitourinary:     Comments: No lesions noted  Musculoskeletal:         General: No tenderness. Normal range of motion.      Cervical back: Normal range of motion and neck supple.   Lymphadenopathy:      Cervical: No cervical adenopathy.   Skin:     General: Skin is warm and dry.      Findings: No rash.   Neurological:      Mental Status: She is alert and oriented to person, place, and time.      Cranial Nerves: No cranial nerve deficit.   Psychiatric:         Thought Content: Thought content normal.     Hospital Outpatient Visit on 08/29/2023   Component Date Value Ref Range Status    Glucose 08/29/2023 64 (L)  70 - 130 mg/dL Final    : [id number] ORONA TORIMeter ID: [id number]    Glucose 08/29/2023 42 (C)  70 - 130 mg/dL Final    RN NotifiedOperator: [id number] REGINA DENISEMeter ID: [id number]    Glucose 08/29/2023 44 (C)  70 - 130 mg/dL Final    Result Not ConfirmedOperator: [id number] MEGHANN KELLIMeter ID: [id number]     Assessment & Plan      1. Chronic nausea    2. Diabetic gastroparesis    1.  Epigastric pain with nausea, continue PPI and Reglan.  Refer patient diagnosed with notable marked distention of for possible gastric stimulator placement.  2.  Chest pain, well controlled.  Continue PPI.  3.  Cough, resolved.  4.  Right upper quadrant pain, well controlled.  LFTs are normal.  5.  Obesity, recommend exercise and diet control.       Orders placed during this encounter include:  Orders Placed This Encounter   Procedures    Ambulatory Referral to Gastroenterology     Referral Priority:   Routine     Referral Type:   Consultation     Referral Reason:   Specialty Services Required     Referred to Provider:   Robert Carty MD     Requested Specialty:   Gastroenterology      Number of Visits Requested:   1       * Surgery not found *    Review and/or summary of lab tests, radiology, procedures, medications. Review and summary of old records and obtaining of history. The risks and benefits of my recommendations, as well as other treatment options were discussed with the patient today. Questions were answered.    New Medications Ordered This Visit   Medications    metoclopramide (REGLAN) 10 MG tablet     Sig: Take 1 tablet by mouth 4 (Four) Times a Day As Needed (nausea).     Dispense:  120 tablet     Refill:  0    famotidine (PEPCID) 40 MG tablet     Sig: Take 1 tablet by mouth Daily for 30 days.     Dispense:  30 tablet     Refill:  0       Follow-up: Return in about 1 month (around 9/30/2023).               Results for orders placed or performed during the hospital encounter of 09/19/23   POCT SARS-CoV-2 Antigen ANITRA    Specimen: Swab   Result Value Ref Range    SARS Antigen Detected (A) Not Detected, Presumptive Negative    Internal Control Passed Passed    Lot Number [id number]     Expiration Date 4/2/24    POC Influenza A/B    Specimen: Swab   Result Value Ref Range    Rapid Influenza A Ag Negative Negative    Rapid Influenza B Ag Negative Negative    Internal Control Passed Passed    Lot Number [id number]     Expiration Date 10/17/25    Results for orders placed or performed during the hospital encounter of 08/29/23   POC Glucose Once    Specimen: Blood   Result Value Ref Range    Glucose 44 (C) 70 - 130 mg/dL   POC Glucose Once    Specimen: Blood   Result Value Ref Range    Glucose 42 (C) 70 - 130 mg/dL   POC Glucose Once    Specimen: Blood   Result Value Ref Range    Glucose 64 (L) 70 - 130 mg/dL   Results for orders placed or performed during the hospital encounter of 08/29/23   Gold Top - SST   Result Value Ref Range    Extra Tube Hold for add-ons.    Scan Slide    Specimen: Arm, Right; Blood   Result Value Ref Range    Anisocytosis Slight/1+ None Seen    Ovalocytes Slight/1+ None  Seen    WBC Morphology Normal Normal    Platelet Estimate Adequate Normal   COVID-19 and FLU A/B PCR - Swab, Nasopharynx    Specimen: Nasopharynx; Swab   Result Value Ref Range    COVID19 Not Detected Not Detected - Ref. Range    Influenza A PCR Not Detected Not Detected    Influenza B PCR Not Detected Not Detected   CBC Auto Differential    Specimen: Arm, Right; Blood   Result Value Ref Range    WBC 13.03 (H) 3.40 - 10.80 10*3/mm3    RBC 5.20 3.77 - 5.28 10*6/mm3    Hemoglobin 14.3 12.0 - 15.9 g/dL    Hematocrit 43.8 34.0 - 46.6 %    MCV 84.2 79.0 - 97.0 fL    MCH 27.5 26.6 - 33.0 pg    MCHC 32.6 31.5 - 35.7 g/dL    RDW 15.2 12.3 - 15.4 %    RDW-SD 46.2 37.0 - 54.0 fl    MPV 9.6 6.0 - 12.0 fL    Platelets 376 140 - 450 10*3/mm3    Neutrophil % 66.3 42.7 - 76.0 %    Lymphocyte % 19.7 19.6 - 45.3 %    Monocyte % 8.4 5.0 - 12.0 %    Eosinophil % 4.6 0.3 - 6.2 %    Basophil % 0.5 0.0 - 1.5 %    Immature Grans % 0.5 0.0 - 0.5 %    Neutrophils, Absolute 8.64 (H) 1.70 - 7.00 10*3/mm3    Lymphocytes, Absolute 2.57 0.70 - 3.10 10*3/mm3    Monocytes, Absolute 1.10 (H) 0.10 - 0.90 10*3/mm3    Eosinophils, Absolute 0.60 (H) 0.00 - 0.40 10*3/mm3    Basophils, Absolute 0.06 0.00 - 0.20 10*3/mm3    Immature Grans, Absolute 0.06 (H) 0.00 - 0.05 10*3/mm3    nRBC 0.0 0.0 - 0.2 /100 WBC   Light Blue Top   Result Value Ref Range    Extra Tube Hold for add-ons.    POC Glucose Once    Specimen: Blood   Result Value Ref Range    Glucose 115 70 - 130 mg/dL   POC Glucose Once    Specimen: Blood   Result Value Ref Range    Glucose 99 70 - 130 mg/dL   Comprehensive Metabolic Panel    Specimen: Arm, Right; Blood   Result Value Ref Range    Glucose 58 (L) 65 - 99 mg/dL    BUN 20 8 - 23 mg/dL    Creatinine 1.18 (H) 0.57 - 1.00 mg/dL    Sodium 142 136 - 145 mmol/L    Potassium 3.3 (L) 3.5 - 5.2 mmol/L    Chloride 104 98 - 107 mmol/L    CO2 25.0 22.0 - 29.0 mmol/L    Calcium 9.6 8.6 - 10.5 mg/dL    Total Protein 8.0 6.0 - 8.5 g/dL    Albumin 4.1  3.5 - 5.2 g/dL    ALT (SGPT) 17 1 - 33 U/L    AST (SGOT) 17 1 - 32 U/L    Alkaline Phosphatase 125 (H) 39 - 117 U/L    Total Bilirubin 0.3 0.0 - 1.2 mg/dL    Globulin 3.9 gm/dL    A/G Ratio 1.1 g/dL    BUN/Creatinine Ratio 16.9 7.0 - 25.0    Anion Gap 13.0 5.0 - 15.0 mmol/L    eGFR 52.7 (L) >60.0 mL/min/1.73   Results for orders placed or performed in visit on 08/25/23   CBC Auto Differential    Specimen: Blood   Result Value Ref Range    WBC 11.58 (H) 3.40 - 10.80 10*3/mm3    RBC 4.59 3.77 - 5.28 10*6/mm3    Hemoglobin 12.7 12.0 - 15.9 g/dL    Hematocrit 39.2 34.0 - 46.6 %    MCV 85.4 79.0 - 97.0 fL    MCH 27.7 26.6 - 33.0 pg    MCHC 32.4 31.5 - 35.7 g/dL    RDW 15.4 12.3 - 15.4 %    RDW-SD 48.0 37.0 - 54.0 fl    MPV 9.5 6.0 - 12.0 fL    Platelets 401 140 - 450 10*3/mm3    Neutrophil % 68.8 42.7 - 76.0 %    Lymphocyte % 18.7 (L) 19.6 - 45.3 %    Monocyte % 7.0 5.0 - 12.0 %    Eosinophil % 4.6 0.3 - 6.2 %    Basophil % 0.4 0.0 - 1.5 %    Immature Grans % 0.5 0.0 - 0.5 %    Neutrophils, Absolute 7.97 (H) 1.70 - 7.00 10*3/mm3    Lymphocytes, Absolute 2.16 0.70 - 3.10 10*3/mm3    Monocytes, Absolute 0.81 0.10 - 0.90 10*3/mm3    Eosinophils, Absolute 0.53 (H) 0.00 - 0.40 10*3/mm3    Basophils, Absolute 0.05 0.00 - 0.20 10*3/mm3    Immature Grans, Absolute 0.06 (H) 0.00 - 0.05 10*3/mm3    nRBC 0.0 0.0 - 0.2 /100 WBC   Sedimentation Rate    Specimen: Blood   Result Value Ref Range    Sed Rate 13 0 - 30 mm/hr   C-reactive Protein    Specimen: Blood   Result Value Ref Range    C-Reactive Protein <0.30 0.00 - 0.50 mg/dL   Comprehensive Metabolic Panel    Specimen: Blood   Result Value Ref Range    Glucose 285 (H) 65 - 99 mg/dL    BUN 17 8 - 23 mg/dL    Creatinine 1.00 0.57 - 1.00 mg/dL    Sodium 137 136 - 145 mmol/L    Potassium 4.1 3.5 - 5.2 mmol/L    Chloride 101 98 - 107 mmol/L    CO2 24.0 22.0 - 29.0 mmol/L    Calcium 9.2 8.6 - 10.5 mg/dL    Total Protein 7.6 6.0 - 8.5 g/dL    Albumin 3.8 3.5 - 5.2 g/dL    ALT (SGPT) 15  1 - 33 U/L    AST (SGOT) 18 1 - 32 U/L    Alkaline Phosphatase 116 39 - 117 U/L    Total Bilirubin 0.3 0.0 - 1.2 mg/dL    Globulin 3.8 gm/dL    A/G Ratio 1.0 g/dL    BUN/Creatinine Ratio 17.0 7.0 - 25.0    Anion Gap 12.0 5.0 - 15.0 mmol/L    eGFR 64.2 >60.0 mL/min/1.73     *Note: Due to a large number of results and/or encounters for the requested time period, some results have not been displayed. A complete set of results can be found in Results Review.         This document has been electronically signed by Jeremiah Wilkins MD on September 20, 2023 14:38 CDT

## 2023-09-22 DIAGNOSIS — Z51.81 MEDICATION MONITORING ENCOUNTER: Primary | ICD-10-CM

## 2023-09-22 DIAGNOSIS — M54.41 CHRONIC RIGHT-SIDED LOW BACK PAIN WITH RIGHT-SIDED SCIATICA: ICD-10-CM

## 2023-09-22 DIAGNOSIS — G89.29 CHRONIC RIGHT-SIDED LOW BACK PAIN WITH RIGHT-SIDED SCIATICA: ICD-10-CM

## 2023-09-22 RX ORDER — HYDROCODONE BITARTRATE AND ACETAMINOPHEN 7.5; 325 MG/1; MG/1
1 TABLET ORAL EVERY 6 HOURS PRN
Qty: 120 TABLET | Refills: 0 | Status: SHIPPED | OUTPATIENT
Start: 2023-09-22

## 2023-09-23 DIAGNOSIS — E11.43 TYPE 2 DIABETES MELLITUS WITH DIABETIC AUTONOMIC NEUROPATHY, WITH LONG-TERM CURRENT USE OF INSULIN: Primary | ICD-10-CM

## 2023-09-23 DIAGNOSIS — Z79.4 TYPE 2 DIABETES MELLITUS WITH DIABETIC AUTONOMIC NEUROPATHY, WITH LONG-TERM CURRENT USE OF INSULIN: Primary | ICD-10-CM

## 2023-09-25 RX ORDER — FLURBIPROFEN SODIUM 0.3 MG/ML
SOLUTION/ DROPS OPHTHALMIC
Qty: 200 EACH | Refills: 3 | Status: SHIPPED | OUTPATIENT
Start: 2023-09-25

## 2023-09-27 LAB
QT INTERVAL: 382 MS
QTC INTERVAL: 446 MS

## 2024-03-24 NOTE — THERAPY TREATMENT NOTE
Patient Name: Ariana Martinez  : 1960    MRN: [id number]                              Today's Date: 2021       Admit Date: 2021    Visit Dx:     ICD-10-CM ICD-9-CM   1. Acute colitis  K52.9 558.9   2. Acute kidney injury (CMS/HCC)  N17.9 584.9   3. Impaired physical mobility  Z74.09 781.99   4. Impaired mobility and activities of daily living  Z74.09 V49.89    Z78.9      Patient Active Problem List   Diagnosis   • 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, with long-term current use of insulin (CMS/HCC)   • Closed nondisplaced fracture of fifth left metatarsal bone   • MAURICIO (generalized anxiety disorder)   • Depression, major, recurrent, moderate (CMS/[facility])   • GERD without esophagitis   • Long term prescription opiate use   • Mixed hyperlipidemia   • Vitamin D deficiency   • Seasonal allergic rhinitis   • Restrictive lung disease secondary to obesity   • Adult body mass index 37.0-37.9   • Snoring   • Class 3 severe obesity due to excess calories without serious comorbidity with body mass index (BMI) of 40.0 to 44.9 in adult (CMS/HCC)   • (HFpEF) heart failure with preserved ejection fraction (CMS/HCC)   • Type 2 diabetes mellitus with diabetic polyneuropathy (CMS/[facility])   • Cyanocobalamin deficiency   • CAD (coronary artery disease)   • Hypertension   • Meniere's disease   • Gastroparesis   • Otitis media with effusion, left   • Pulmonary hypertension (CMS/HCC)   • Displaced fracture of fifth metatarsal bone, left foot, subsequent encounter for fracture with nonunion   • Pes cavus   • Primary osteoarthritis involving multiple joints   • Generalized anxiety disorder   • Chronic right-sided low back pain with right-sided sciatica   • Chest pain   • Thrombocytosis (CMS/HCC)   • Leukocytosis   • Iron deficiency   • Adverse effect of iron   • Hindfoot varus, acquired, left   • Chest pain due to myocardial ischemia   • Nonrheumatic tricuspid valve regurgitation   • Iron deficiency  anemia due to chronic blood loss   • Malaise and fatigue   • Nonunion of subtalar arthrodesis   • Ankle arthritis   • Cellulitis of left lower extremity   • Urinary retention   • Acute bacterial endocarditis   • Staphylococcus aureus bacteremia   • Infection due to Acinetobacter species   • Endocarditis   • Left foot infection   • Uncontrolled hypertension   • Occlusion and stenosis of bilateral carotid arteries   • S/P BKA (below knee amputation) unilateral, left (CMS/HCC)   • Phantom pain after amputation of lower extremity (CMS/HCC)   • S/P CABG (coronary artery bypass graft)   • Acute colitis   • Severe malnutrition (CMS/HCC)     Past Medical History:   Diagnosis Date   • Angina, class IV (CMS/HCC)    • Anxiety    • Anxiety and depression    • Arthritis    • Benign paroxysmal positional vertigo    • Bladder disorder     has bladder stimulator   • Carpal tunnel syndrome    • CHF (congestive heart failure) (CMS/HCC)    • Chronic pain    • Coronary atherosclerosis     hx CABG 2005.  is followed by Dr Kwon   • Depression    • Diabetes mellitus (CMS/HCC)     Type 2, controlled   • Diabetic polyneuropathy (CMS/HCC)    • Disease of thyroid gland    • Elevated cholesterol    • Female stress incontinence    • Foot pain, left    • Full dentures    • Gastroparesis    • GERD (gastroesophageal reflux disease)    • Hyperlipidemia    • Hypertension    • Low back pain    • Malaise and fatigue    • Multiple joint pain    • Obesity     Refuses to be weighed   • Occlusion and stenosis of bilateral carotid arteries 6/18/2021   • Otalgia     Both   • Perforation of tympanic membrane     Left   • Postoperative wound infection    • Vitamin D deficiency    • Wears glasses     reading     Past Surgical History:   Procedure Laterality Date   • ABDOMINAL SURGERY     • AMPUTATION FOOT     • ANGIOPLASTY      coronary   • ANKLE ARTHROSCOPY Left 2/26/2021    Procedure: Left foot hardware removal, ankle arthroscopy, bone grafting , left  foot exostectomy;  Surgeon: Ignacio Lord DPM;  Location: [facility] OR;  Service: Podiatry;  Laterality: Left;   • BREAST BIOPSY Right    • CARDIAC CATHETERIZATION     • CARDIAC CATHETERIZATION N/A 6/20/2017    Procedure: Right Heart Cath;  Surgeon: Can Kwon MD PhD;  Location: [facility] CATH INVASIVE LOCATION;  Service:    • CARDIAC CATHETERIZATION N/A 2/18/2020    Procedure: Left Heart Cath;  Surgeon: Catalina Cooper MD;  Location: [facility] CATH INVASIVE LOCATION;  Service: Cardiology;  Laterality: N/A;   • CARPAL TUNNEL RELEASE     • CHOLECYSTECTOMY     • COLONOSCOPY N/A 6/24/2020    Procedure: COLONOSCOPY;  Surgeon: Julián Maldonado MD;  Location: [facility] ENDOSCOPY;  Service: Gastroenterology;  Laterality: N/A;   • CORONARY ARTERY BYPASS GRAFT  2005   • ENDOSCOPY N/A 10/19/2018    Procedure: ESOPHAGOGASTRODUODENOSCOPY possible dilation;  Surgeon: Julián Maldonado MD;  Location: [facility] ENDOSCOPY;  Service: Gastroenterology   • ENDOSCOPY N/A 6/24/2020    Procedure: ESOPHAGOGASTRODUODENOSCOPY WED appt please;  Surgeon: Julián Maldonado MD;  Location: [facility] ENDOSCOPY;  Service: Gastroenterology;  Laterality: N/A;   • ENDOSCOPY AND COLONOSCOPY     • FOOT SURGERY      Toes   • FOOT SURGERY     • GASTRIC BANDING      Revision, laparoscopic   • HYSTERECTOMY     • INCISION AND DRAINAGE LEG Left 3/12/2021    Procedure: Left ankle arthroscopic irrigation and debridement, screw removal;  Surgeon: Ignacio Lord DPM;  Location: [facility] OR;  Service: Podiatry;  Laterality: Left;   • MOUTH SURGERY     • SALPINGO OOPHORECTOMY     • SHOULDER SURGERY     • SUBTALAR ARTHRODESIS Left 1/16/2019    Procedure: LEFT FOOT HARDWARE REMOVAL, FIFTH METATARSAL , OPEN REDUCTION INTERNAL FIXATION, CALCANEAL OSTEOTOMY;  Surgeon: Ignacio Lord DPM;  Location: [facility] OR;  Service: Podiatry   • SUBTALAR ARTHRODESIS Left 10/16/2019    Procedure: foot hardware removal, subtalar joint fusion  possible de/reattachment of  achilles tendon        (c-arm);  Surgeon: Ignacio Lord DPM;  Location: [facility];  Service: Podiatry   • SUBTALAR ARTHRODESIS Left 9/30/2020    Procedure: subtalar, talonavicular joint arthrodesis.  Removal hardware.          (c-arm);  Surgeon: Ignacio Lord DPM;  Location: [facility];  Service: Podiatry;  Laterality: Left;   • TRANSESOPHAGEAL ECHOCARDIOGRAM (LAMONTE)      With color flow     General Information     Row Name 07/12/21 0850          OT Time and Intention    Document Type  therapy note (daily note)  -     Mode of Treatment  individual therapy;occupational therapy  -     Row Name 07/12/21 0850          General Information    Patient Profile Reviewed  yes  -     Existing Precautions/Restrictions  fall  -RC     Row Name 07/12/21 0850          Cognition    Orientation Status (Cognition)  oriented x 4  -     Row Name 07/12/21 0850          Safety Issues, Functional Mobility    Impairments Affecting Function (Mobility)  strength;endurance/activity tolerance;balance  -       User Key  (r) = Recorded By, (t) = Taken By, (c) = Cosigned By    Initials Name Provider Type    RC Corina Morillo COTA/L Occupational Therapy Assistant          Mobility/ADL's    No documentation.       Obj/Interventions    No documentation.       Goals/Plan     Row Name 07/12/21 0850          Transfer Goal 1 (OT)    Activity/Assistive Device (Transfer Goal 1, OT)  toilet  -RC     [place]/Cues Needed (Transfer Goal 1, OT)  contact guard assist  -RC     Time Frame (Transfer Goal 1, OT)  long term goal (LTG);by discharge  -     Progress/Outcome (Transfer Goal 1, OT)  goal not met  -RC     Row Name 07/12/21 0850          Bathing Goal 1 (OT)    Activity/Device (Bathing Goal 1, OT)  lower body bathing AD As needed  -RC     [place]/Cues Needed (Bathing Goal 1, OT)  minimum assist (75% or more patient effort)  -RC     Time Frame (Bathing Goal 1, OT)  long term goal (LTG);by discharge  -      Progress/Outcomes (Bathing Goal 1, OT)  goal not met  -     Row Name 07/12/21 0850          Dressing Goal 1 (OT)    Activity/Device (Dressing Goal 1, OT)  lower body dressing AD as needed  -     [place]/Cues Needed (Dressing Goal 1, OT)  minimum assist (75% or more patient effort)  -     Time Frame (Dressing Goal 1, OT)  long term goal (LTG);by discharge  -     Progress/Outcome (Dressing Goal 1, OT)  goal not met  -       User Key  (r) = Recorded By, (t) = Taken By, (c) = Cosigned By    Initials Name Provider Type     Corina Morillo COTA/L Occupational Therapy Assistant        Clinical Impression    No documentation.       Outcome Measures    No documentation.       Occupational Therapy Education                 Title: PT OT SLP Therapies (In Progress)     Topic: Occupational Therapy (In Progress)     Point: ADL training (Done)     Description:   Instruct learner(s) on proper safety adaptation and remediation techniques during self care or transfers.   Instruct in proper use of assistive devices.              Learning Progress Summary           Patient Acceptance, E, NR,VU by  at 7/12/2021 1047                   Point: Home exercise program (Not Started)     Description:   Instruct learner(s) on appropriate technique for monitoring, assisting and/or progressing therapeutic exercises/activities.              Learner Progress:  Not documented in this visit.          Point: Precautions (Done)     Description:   Instruct learner(s) on prescribed precautions during self-care and functional transfers.              Learning Progress Summary           Patient Acceptance, E, NR,VU by  at 7/12/2021 1047    Acceptance, E, VU by RB at 7/8/2021 1242    Comment: Edu pt on use of gait belt and non skid socks when OOB and no OOB without assist.                   Point: Body mechanics (Done)     Description:   Instruct learner(s) on proper positioning and spine alignment during self-care, functional mobility  activities and/or exercises.              Learning Progress Summary           Patient Acceptance, E, NR,VU by  at 7/12/2021 1047                               User Key     Initials Effective Dates Name Provider Type Discipline    RB 06/16/21 -  Fredi France, OT Occupational Therapist OT     06/16/21 -  YiselCorina blankenship G, MCKEON/L Occupational Therapy Assistant OT              OT Recommendation and Plan     Plan of Care Review  Plan of Care Reviewed With: patient  Progress: improving  Outcome Summary: Nursing OK'd tx pt agreeable to bathing and sitting eob cabrera for bath and min assist to [facility] gown, sat eob ~ 30 min supervision, B ue arom ex. supervision for sup<>sit. pt e/o fatigue and request to do lb bath later after resting  cont poc, recommend assist and cont OT at next level of care     Time Calculation:   Time Calculation- OT     Row Name 07/12/21 1040             Time Calculation- OT    OT Start Time  0850  -RC      OT Stop Time  0928  -      OT Time Calculation (min)  38 min  -      Total Timed Code Minutes- OT  38 minute(s)  -      OT Received On  07/12/21  -         Timed Charges    55228 - OT Therapeutic Exercise Minutes  8  -RC      12929 - OT Therapeutic Activity Minutes  15  -      57206 - OT Self Care/Mgmt Minutes  15  -         Total Minutes    Timed Charges Total Minutes  38  -       Total Minutes  38  -        User Key  (r) = Recorded By, (t) = Taken By, (c) = Cosigned By    Initials Name Provider Type    RC Andrew Morilloa G, MCKEON/L Occupational Therapy Assistant        Therapy Charges for Today     Code Description Service Date Service Provider Modifiers Qty    45088719979 HC OT THER PROC EA 15 MIN 7/11/2021 Yisel, Corina G, MCKEON/L GO 2    67007542968 HC OT THER PROC EA 15 MIN 7/12/2021 Yisel, Corina G, MCKEON/L GO 1    01737961357 HC OT THERAPEUTIC ACT EA 15 MIN 7/12/2021 Yisel, Corina G, MCKEON/L GO 1    12052889371 HC OT SELF CARE/MGMT/TRAIN EA 15 MIN 7/12/2021  Corina Morillo, LINDA/JORDAN GO 1               LINDA Whyte/JORDAN  7/12/2021   Medication teaching and assessment/Observation and assessment/Rehabilitation services/Teaching and training

## (undated) DEVICE — NDL HYPO ECLPS SFTY 25G 1 1/2IN

## (undated) DEVICE — STERILE POLYISOPRENE POWDER-FREE SURGICAL GLOVES WITH EMOLLIENT COATING: Brand: PROTEXIS

## (undated) DEVICE — CONTAINER,SPECIMEN,OR STERILE,4OZ: Brand: MEDLINE

## (undated) DEVICE — TBG PENCL TELESCP MEGADYNE SMOKE EVAC 10FT

## (undated) DEVICE — STERILE POLYISOPRENE POWDER-FREE SURGICAL GLOVES: Brand: PROTEXIS

## (undated) DEVICE — SUT ETHLN 3-0 FS118IN 663H

## (undated) DEVICE — SOL IRR NACL 0.9PCT BT 1000ML

## (undated) DEVICE — GLV SURG SENSICARE POLYISPRN W/ALOE PF LF 6.5 GRN STRL

## (undated) DEVICE — PAD GRND REM POLYHESIVE A/ DISP

## (undated) DEVICE — SYR LL 3CC

## (undated) DEVICE — GLV SURG SENSICARE PI PF LF 7 GRN STRL

## (undated) DEVICE — PK CATH LAB 60

## (undated) DEVICE — 6F .070 XB LAD 4 100CM: Brand: VISTA BRITE TIP

## (undated) DEVICE — ELECTRODE,RT,STRESS,FOAM,50PK: Brand: MEDLINE

## (undated) DEVICE — PAD UNDERCAST WYTEX 6IN 4YD LF STRL

## (undated) DEVICE — ENDOSCOPIC SEAL URO 1 SIZE FITS ALL: Brand: ENDOSCOPIC SEAL

## (undated) DEVICE — BNDG ELAS CO-FLEX SLF ADHR 3IN5YD LF2 STRL

## (undated) DEVICE — SYR 10ML

## (undated) DEVICE — SUT ETHLN 4/0 FS2 18IN 662H

## (undated) DEVICE — TBG PUMP ARTHSCP MAIN AR6400 16FT

## (undated) DEVICE — CVR C/ARM MINI

## (undated) DEVICE — COVER,MAYO STAND,STERILE: Brand: MEDLINE

## (undated) DEVICE — NDL ANGIOGR ADV THN SMOTH SGLWALL 21G 1.5

## (undated) DEVICE — DISPOSABLE TOURNIQUET CUFF SINGLE BLADDER, DUAL PORT AND QUICK CONNECT CONNECTOR: Brand: COLOR CUFF

## (undated) DEVICE — PK CYSTO LF 60

## (undated) DEVICE — IMPLANTABLE DEVICE
Type: IMPLANTABLE DEVICE | Site: ANKLE | Status: NON-FUNCTIONAL
Brand: MICROAIRE®
Removed: 2021-02-26

## (undated) DEVICE — BLD DISSCT COOL CUT SJ 3MM 7CM

## (undated) DEVICE — DRSNG GZ CURAD XEROFORM NONADHS 5X9IN STRL

## (undated) DEVICE — CATH DIAG IMPULSE M/ PK 145 5FR

## (undated) DEVICE — SPNG GZ WOVN 4X4IN 12PLY 10/BX STRL

## (undated) DEVICE — CATH THERMODIL SWANGANZ 4LUM 6F 110CM

## (undated) DEVICE — SINGLE-USE BIOPSY FORCEPS: Brand: RADIAL JAW 4

## (undated) DEVICE — CANNULATED DRILL: Brand: FIXOS

## (undated) DEVICE — IMPLANTABLE DEVICE
Type: IMPLANTABLE DEVICE | Status: NON-FUNCTIONAL
Removed: 2019-10-16

## (undated) DEVICE — BIT DRL LNG 1.7MM

## (undated) DEVICE — GOWN,AURORA,NOREINF,RAGLAN,XL,STERILE: Brand: MEDLINE

## (undated) DEVICE — SOL IRR LACT RNG BG 3000ML

## (undated) DEVICE — APPL CHLORAPREP W/TINT 26ML ORNG

## (undated) DEVICE — GLV SURG NEOLON 2G PF LF 6.5 STRL

## (undated) DEVICE — SOL IRR H2O BTL 1000ML STRL

## (undated) DEVICE — PATIENT RETURN ELECTRODE, SINGLE-USE, CONTACT QUALITY MONITORING, ADULT, WITH 9FT CORD, FOR PATIENTS WEIGING OVER 33LBS. (15KG): Brand: MEGADYNE

## (undated) DEVICE — A2000 MULTI-USE SYRINGE KIT, P/N 701277-003KIT CONTENTS: 100ML CONTRAST RESERVOIR AND TUBING WITH CONTRAST SPIKE AND CLAMP: Brand: A2000 MULTI-USE SYRINGE KIT

## (undated) DEVICE — GLV SURG SENSICARE PI ORTHO SZ6.5 LF STRL

## (undated) DEVICE — PK POD 60

## (undated) DEVICE — BIT DRL STR CANN 3.2MM

## (undated) DEVICE — SUT ETHLN 4/0 FS2 18IN 662G

## (undated) DEVICE — SUT ETHLN 3/0 FS1 663G

## (undated) DEVICE — UNTHREADED GUIDE WIRE: Brand: FIXOS

## (undated) DEVICE — TP SXN YANKR BLB TIP W/TBG 10F LF STRL

## (undated) DEVICE — 9165 UNIVERSAL PATIENT PLATE: Brand: 3M™

## (undated) DEVICE — SYR LUERLOK 20CC BX/50

## (undated) DEVICE — KWIRE TROC TP THRD 1.6X235MM
Type: IMPLANTABLE DEVICE | Site: FOOT | Status: NON-FUNCTIONAL
Removed: 2019-01-16

## (undated) DEVICE — SUT VIC 2/0 SH 27IN

## (undated) DEVICE — PK MAJ PROC LF 60

## (undated) DEVICE — HI-TORQUE BALANCE MIDDLEWEIGHT GUIDE WIRE W/HYDROCOAT .014 J TIP 3.0 CM X 300 CM: Brand: HI-TORQUE BALANCE MIDDLEWEIGHT

## (undated) DEVICE — CAMERA COVER: Brand: UNBRANDED

## (undated) DEVICE — ST CVR PROB PULLUP ULTRASND 5X48IN

## (undated) DEVICE — CVR FLUOROSCOPE C/ARM W/TP 36X28IN

## (undated) DEVICE — IMPLANTABLE DEVICE
Type: IMPLANTABLE DEVICE | Site: FOOT | Status: NON-FUNCTIONAL
Removed: 2019-01-16

## (undated) DEVICE — SUT VIC 3/0 SH 27IN J416H

## (undated) DEVICE — PRECISION THIN (9.0 X 0.38 X 31.0MM)

## (undated) DEVICE — LIGHT HANDLE: Brand: DEVON

## (undated) DEVICE — PAD,ABDOMINAL,8"X10",ST,LF: Brand: MEDLINE

## (undated) DEVICE — MODEL BT2000 P/N 700287-012KIT CONTENTS: MANIFOLD WITH SALINE AND CONTRAST PORTS, SALINE TUBING WITH SPIKE AND HAND SYRINGE, TRANSDUCER: Brand: BT2000 AUTOMATED MANIFOLD KIT

## (undated) DEVICE — 6F .070 XB LAD 3.5 100CM: Brand: VISTA BRITE TIP

## (undated) DEVICE — BNDG ELAS ELITE V/CLOSE 4IN 5YD LF STRL

## (undated) DEVICE — UNDYED BRAIDED (POLYGLACTIN 910), SYNTHETIC ABSORBABLE SUTURE: Brand: COATED VICRYL

## (undated) DEVICE — BITEBLOCK ENDO W/STRAP 60F A/ LF DISP

## (undated) DEVICE — CATH DIAG EXPO M/ PK 6FR FL4/FR4 PIG 3PK

## (undated) DEVICE — GOWN,PREVENTION PLUS,XLARGE,STERILE: Brand: MEDLINE

## (undated) DEVICE — DRAPE,U/ SHT,SPLIT,PLAS,STERIL: Brand: MEDLINE

## (undated) DEVICE — ANTIBACTERIAL UNDYED BRAIDED (POLYGLACTIN 910), SYNTHETIC ABSORBABLE SUTURE: Brand: COATED VICRYL

## (undated) DEVICE — KT INTRO MINISTICK MAX W/GW NITNL/TUNG ECHO 4F 21G 7CM

## (undated) DEVICE — UNDRPD BREATH 23X36 BG/10

## (undated) DEVICE — Device

## (undated) DEVICE — GLIDESHEATH BASIC HYDROPHILIC COATED INTRODUCER SHEATH: Brand: GLIDESHEATH

## (undated) DEVICE — DISPOSABLE TOURNIQUET CUFF SINGLE BLADDER, SINGLE PORT AND QUICK CONNECT CONNECTOR: Brand: COLOR CUFF

## (undated) DEVICE — CANNULATED DRILL

## (undated) DEVICE — SPLNT PLSTR CAST 5IN

## (undated) DEVICE — BNDG ELAS ELITE V/CLOSE 6IN 5YD LF STRL

## (undated) DEVICE — GOWN,NON-REINFORCED,SIRUS,SET IN SLV,XL: Brand: MEDLINE

## (undated) DEVICE — GLV SURG SENSICARE PI ORTHO PF SZ7 LF STRL

## (undated) DEVICE — USE OF THE SMARTNEEDLE DEVICE IS INDICATED WHEN BLOOD FLOW MUST BE DETECTED FOR PERCUTANEOUS VESSEL CANNULATION. THE VESSEL MUST BE OF A CALIBER WHICH WOULD NORMALLY BE PUNCTURED WITH A NEEDLE AND/OR CATHETER OF THIS SIZE OR LARGER.: Brand: SMARTNEEDLE® VASCULAR ACCESS SYSTEM

## (undated) DEVICE — ADHS LIQ MASTISOL 2/3ML

## (undated) DEVICE — HI-TORQUE VERSACORE FLOPPY GUIDE WIRE SYSTEM 260 CM: Brand: HI-TORQUE VERSACORE

## (undated) DEVICE — CVR SURG EQUIP BND RECTG 36X28

## (undated) DEVICE — INTENDED FOR TISSUE SEPARATION, AND OTHER PROCEDURES THAT REQUIRE A SHARP SURGICAL BLADE TO PUNCTURE OR CUT.: Brand: BARD-PARKER ® CARBON RIB-BACK BLADES

## (undated) DEVICE — GLV SURG SENSICARE PI LF PF 7.5 GRN STRL

## (undated) DEVICE — KT PT/PROG SMRT INTERSTIM/X NEUROSTM W/COMMUNIC

## (undated) DEVICE — PAD UNDERCAST WYTEX 3IN 4YD LF STRL

## (undated) DEVICE — NDL HYPO PRECISIONGLIDE/REG 18G 1IN PNK

## (undated) DEVICE — COPILOT KIT INCLUDES BLEEDBACK CONTROL VALVE / GUIDE WIRE INTRODUCER / TORQUE DEVICE: Brand: ACCESSORIES

## (undated) DEVICE — NEEDLE, QUINCKE, 18GX3.5": Brand: MEDLINE

## (undated) DEVICE — DRN PENRS 1/4X18IN LTX

## (undated) DEVICE — ARTERIAL NEEDLE: Brand: UNBRANDED

## (undated) DEVICE — WEBRIL COTTON UNDERCAST PADDING: Brand: WEBRIL

## (undated) DEVICE — INTRO SHEATH ART/FEM ENGAGE .038 6F12CM

## (undated) DEVICE — ADHS SKIN PREMIERPRO EXOFIN TOPICAL HI/VISC .5ML

## (undated) DEVICE — SYR LUERLOK 30CC

## (undated) DEVICE — GAUZE,PACKING STRIP,IODOFORM,1/2"X5YD,ST: Brand: CURAD

## (undated) DEVICE — MSK ENDO PORT O2 POM ELITE CURAPLEX A/

## (undated) DEVICE — GLV SURG SIGNATURE ESSENTIAL PF LTX SZ6.5

## (undated) DEVICE — PAD UNDERCAST WYTEX 4IN 4YD LF STRL

## (undated) DEVICE — X-DRAPE ABS 12"X17" .25MM LEAD EQUIV STERILE X-RAY SHIELD 10/BOX: Brand: X-DRAPE

## (undated) DEVICE — CYSTO/BLADDER IRRIGATION SET, REGULATING CLAMP

## (undated) DEVICE — NDL FORAMAN 18.5G 5IN

## (undated) DEVICE — KIRSCHNER WIRE
Type: IMPLANTABLE DEVICE | Status: NON-FUNCTIONAL
Removed: 2020-09-30

## (undated) DEVICE — GLV SURG SENSICARE POLYISPRN W/ALOE PF LF 6 GRN STRL

## (undated) DEVICE — GW PERIPH GUIDERIGHT STD/EXCHNG/J/TIP SS 0.035IN 5X260CM

## (undated) DEVICE — GOWN,PREVENTION PLUS,XLNG/XXLARGE,STRL: Brand: MEDLINE

## (undated) DEVICE — SHEET,DRAPE,53X77,STERILE: Brand: MEDLINE

## (undated) DEVICE — SCRW LK VA LP TI 2.4X18MM
Type: IMPLANTABLE DEVICE | Site: FOOT | Status: NON-FUNCTIONAL
Removed: 2019-01-16

## (undated) DEVICE — Device: Brand: DISPOSABLE ELECTROSURGICAL SNARE

## (undated) DEVICE — STERILE SLEEVE: Brand: CONVERTORS

## (undated) DEVICE — MYNXGRIP 6F/7F: Brand: MYNXGRIP

## (undated) DEVICE — SPNG LAP 18X18IN LF STRL PK/5

## (undated) DEVICE — RADIFOCUS OPTITORQUE ANGIOGRAPHIC CATHETER: Brand: OPTITORQUE

## (undated) DEVICE — SOL IRR NACL 0.9PCT 3000ML

## (undated) DEVICE — DEV INFL MONARCH 20ML

## (undated) DEVICE — NEEDLE,HYPODERM,SAFETY, 22GX1.5: Brand: MEDLINE

## (undated) DEVICE — GLV SURG BIOGEL/SUPERSENSITIVE PF SZ7.5

## (undated) DEVICE — DRILL BIT, AO DIA2.0MM X 135MM, SCALED: Brand: VARIAX

## (undated) DEVICE — STRIP,CLOSURE,WOUND,MEDI-STRIP,1/2X4: Brand: MEDLINE

## (undated) DEVICE — TRAP SXN POLYP QUICKCATCH LF

## (undated) DEVICE — PINNACLE INTRODUCER SHEATH: Brand: PINNACLE